# Patient Record
Sex: MALE | Race: WHITE | NOT HISPANIC OR LATINO
[De-identification: names, ages, dates, MRNs, and addresses within clinical notes are randomized per-mention and may not be internally consistent; named-entity substitution may affect disease eponyms.]

---

## 2019-01-25 RX ADMIN — Medication 1 PATCH: at 19:00

## 2019-05-10 ENCOUNTER — TRANSCRIPTION ENCOUNTER (OUTPATIENT)
Age: 49
End: 2019-05-10

## 2019-05-10 ENCOUNTER — INPATIENT (INPATIENT)
Facility: HOSPITAL | Age: 49
LOS: 25 days | Discharge: HOME | DRG: 853 | End: 2019-06-05
Attending: INTERNAL MEDICINE | Admitting: SPECIALIST
Payer: COMMERCIAL

## 2019-05-10 VITALS
OXYGEN SATURATION: 99 % | TEMPERATURE: 99 F | WEIGHT: 179.9 LBS | HEIGHT: 74 IN | SYSTOLIC BLOOD PRESSURE: 129 MMHG | DIASTOLIC BLOOD PRESSURE: 92 MMHG | RESPIRATION RATE: 17 BRPM | HEART RATE: 95 BPM

## 2019-05-10 LAB
ALBUMIN SERPL ELPH-MCNC: 3.6 G/DL — SIGNIFICANT CHANGE UP (ref 3.3–5)
ALP SERPL-CCNC: 144 U/L — HIGH (ref 40–120)
ALT FLD-CCNC: 14 U/L — SIGNIFICANT CHANGE UP (ref 12–78)
ANION GAP SERPL CALC-SCNC: 8 MMOL/L — SIGNIFICANT CHANGE UP (ref 5–17)
APTT BLD: 31.4 SEC — SIGNIFICANT CHANGE UP (ref 28.5–37)
AST SERPL-CCNC: 14 U/L — LOW (ref 15–37)
BASOPHILS # BLD AUTO: 0.04 K/UL — SIGNIFICANT CHANGE UP (ref 0–0.2)
BASOPHILS NFR BLD AUTO: 0.3 % — SIGNIFICANT CHANGE UP (ref 0–2)
BILIRUB SERPL-MCNC: 0.5 MG/DL — SIGNIFICANT CHANGE UP (ref 0.2–1.2)
BUN SERPL-MCNC: 22 MG/DL — SIGNIFICANT CHANGE UP (ref 7–23)
CALCIUM SERPL-MCNC: 8.9 MG/DL — SIGNIFICANT CHANGE UP (ref 8.5–10.1)
CHLORIDE SERPL-SCNC: 109 MMOL/L — HIGH (ref 96–108)
CO2 SERPL-SCNC: 22 MMOL/L — SIGNIFICANT CHANGE UP (ref 22–31)
CREAT SERPL-MCNC: 1.7 MG/DL — HIGH (ref 0.5–1.3)
EOSINOPHIL # BLD AUTO: 0.38 K/UL — SIGNIFICANT CHANGE UP (ref 0–0.5)
EOSINOPHIL NFR BLD AUTO: 2.4 % — SIGNIFICANT CHANGE UP (ref 0–6)
FLU A RESULT: SIGNIFICANT CHANGE UP
FLU A RESULT: SIGNIFICANT CHANGE UP
FLUAV AG NPH QL: SIGNIFICANT CHANGE UP
FLUBV AG NPH QL: SIGNIFICANT CHANGE UP
GLUCOSE SERPL-MCNC: 202 MG/DL — HIGH (ref 70–99)
HCT VFR BLD CALC: 37.1 % — LOW (ref 39–50)
HGB BLD-MCNC: 12.4 G/DL — LOW (ref 13–17)
IMM GRANULOCYTES NFR BLD AUTO: 0.4 % — SIGNIFICANT CHANGE UP (ref 0–1.5)
INR BLD: 0.94 RATIO — SIGNIFICANT CHANGE UP (ref 0.88–1.16)
LACTATE SERPL-SCNC: 0.8 MMOL/L — SIGNIFICANT CHANGE UP (ref 0.7–2)
LYMPHOCYTES # BLD AUTO: 0.89 K/UL — LOW (ref 1–3.3)
LYMPHOCYTES # BLD AUTO: 5.6 % — LOW (ref 13–44)
MCHC RBC-ENTMCNC: 31.2 PG — SIGNIFICANT CHANGE UP (ref 27–34)
MCHC RBC-ENTMCNC: 33.4 GM/DL — SIGNIFICANT CHANGE UP (ref 32–36)
MCV RBC AUTO: 93.5 FL — SIGNIFICANT CHANGE UP (ref 80–100)
MONOCYTES # BLD AUTO: 0.94 K/UL — HIGH (ref 0–0.9)
MONOCYTES NFR BLD AUTO: 5.9 % — SIGNIFICANT CHANGE UP (ref 2–14)
NEUTROPHILS # BLD AUTO: 13.49 K/UL — HIGH (ref 1.8–7.4)
NEUTROPHILS NFR BLD AUTO: 85.4 % — HIGH (ref 43–77)
NRBC # BLD: 0 /100 WBCS — SIGNIFICANT CHANGE UP (ref 0–0)
NT-PROBNP SERPL-SCNC: 832 PG/ML — HIGH (ref 0–125)
PLATELET # BLD AUTO: 301 K/UL — SIGNIFICANT CHANGE UP (ref 150–400)
POTASSIUM SERPL-MCNC: 4.4 MMOL/L — SIGNIFICANT CHANGE UP (ref 3.5–5.3)
POTASSIUM SERPL-SCNC: 4.4 MMOL/L — SIGNIFICANT CHANGE UP (ref 3.5–5.3)
PROT SERPL-MCNC: 7.4 G/DL — SIGNIFICANT CHANGE UP (ref 6–8.3)
PROTHROM AB SERPL-ACNC: 10.7 SEC — SIGNIFICANT CHANGE UP (ref 10–12.9)
RBC # BLD: 3.97 M/UL — LOW (ref 4.2–5.8)
RBC # FLD: 12.1 % — SIGNIFICANT CHANGE UP (ref 10.3–14.5)
RSV RESULT: SIGNIFICANT CHANGE UP
RSV RNA RESP QL NAA+PROBE: SIGNIFICANT CHANGE UP
SODIUM SERPL-SCNC: 139 MMOL/L — SIGNIFICANT CHANGE UP (ref 135–145)
WBC # BLD: 15.81 K/UL — HIGH (ref 3.8–10.5)
WBC # FLD AUTO: 15.81 K/UL — HIGH (ref 3.8–10.5)

## 2019-05-10 PROCEDURE — 99285 EMERGENCY DEPT VISIT HI MDM: CPT

## 2019-05-10 PROCEDURE — 74176 CT ABD & PELVIS W/O CONTRAST: CPT | Mod: 26

## 2019-05-10 PROCEDURE — 76705 ECHO EXAM OF ABDOMEN: CPT | Mod: 26

## 2019-05-10 PROCEDURE — 93010 ELECTROCARDIOGRAM REPORT: CPT

## 2019-05-10 PROCEDURE — 71045 X-RAY EXAM CHEST 1 VIEW: CPT | Mod: 26

## 2019-05-10 RX ORDER — SODIUM CHLORIDE 9 MG/ML
1000 INJECTION INTRAMUSCULAR; INTRAVENOUS; SUBCUTANEOUS ONCE
Refills: 0 | Status: COMPLETED | OUTPATIENT
Start: 2019-05-10 | End: 2019-05-10

## 2019-05-10 RX ORDER — PIPERACILLIN AND TAZOBACTAM 4; .5 G/20ML; G/20ML
3.38 INJECTION, POWDER, LYOPHILIZED, FOR SOLUTION INTRAVENOUS ONCE
Refills: 0 | Status: COMPLETED | OUTPATIENT
Start: 2019-05-10 | End: 2019-05-10

## 2019-05-10 RX ORDER — PANTOPRAZOLE SODIUM 20 MG/1
40 TABLET, DELAYED RELEASE ORAL ONCE
Refills: 0 | Status: COMPLETED | OUTPATIENT
Start: 2019-05-10 | End: 2019-05-10

## 2019-05-10 RX ORDER — ONDANSETRON 8 MG/1
4 TABLET, FILM COATED ORAL ONCE
Refills: 0 | Status: COMPLETED | OUTPATIENT
Start: 2019-05-10 | End: 2019-05-10

## 2019-05-10 RX ORDER — MORPHINE SULFATE 50 MG/1
4 CAPSULE, EXTENDED RELEASE ORAL ONCE
Refills: 0 | Status: DISCONTINUED | OUTPATIENT
Start: 2019-05-10 | End: 2019-05-10

## 2019-05-10 RX ADMIN — PIPERACILLIN AND TAZOBACTAM 200 GRAM(S): 4; .5 INJECTION, POWDER, LYOPHILIZED, FOR SOLUTION INTRAVENOUS at 23:45

## 2019-05-10 RX ADMIN — ONDANSETRON 4 MILLIGRAM(S): 8 TABLET, FILM COATED ORAL at 22:36

## 2019-05-10 RX ADMIN — MORPHINE SULFATE 4 MILLIGRAM(S): 50 CAPSULE, EXTENDED RELEASE ORAL at 22:36

## 2019-05-10 RX ADMIN — SODIUM CHLORIDE 1000 MILLILITER(S): 9 INJECTION INTRAMUSCULAR; INTRAVENOUS; SUBCUTANEOUS at 22:36

## 2019-05-10 RX ADMIN — PANTOPRAZOLE SODIUM 40 MILLIGRAM(S): 20 TABLET, DELAYED RELEASE ORAL at 23:47

## 2019-05-10 RX ADMIN — MORPHINE SULFATE 4 MILLIGRAM(S): 50 CAPSULE, EXTENDED RELEASE ORAL at 23:44

## 2019-05-10 RX ADMIN — SODIUM CHLORIDE 1000 MILLILITER(S): 9 INJECTION INTRAMUSCULAR; INTRAVENOUS; SUBCUTANEOUS at 23:40

## 2019-05-10 RX ADMIN — MORPHINE SULFATE 4 MILLIGRAM(S): 50 CAPSULE, EXTENDED RELEASE ORAL at 22:51

## 2019-05-10 NOTE — ED PROVIDER NOTE - GASTROINTESTINAL, MLM
pt has distended and multilobular type umbilical hernias the inferior hernia is reducible but the superior one is not and is painful in addition the abd is tender across the epigastrum

## 2019-05-10 NOTE — ED PROVIDER NOTE - CONSTITUTIONAL, MLM
normal... Well appearing, unkempt, well nourished, awake, alert, oriented to person, place, time/situation and in no apparent distress.

## 2019-05-10 NOTE — ED PROVIDER NOTE - CLINICAL SUMMARY MEDICAL DECISION MAKING FREE TEXT BOX
ro obstn ro pancreatitis ro gb ro pulmonary cardiac inpt with multiple sx  labss ekg xray ct pain mgt

## 2019-05-10 NOTE — ED ADULT NURSE NOTE - CHIEF COMPLAINT QUOTE
patient arrived to triage, states "I feel short of breath for the past 2 hours and my stomach hurts" patient points to umbilical area, denies radiating pain. patient denies chest pain, dizziness, headache, blurred vision, palpitations.  patient is a current smoker. patient denies medical and surgical history and illicit drug/medication use.

## 2019-05-10 NOTE — ED PROVIDER NOTE - OBJECTIVE STATEMENT
pt is a 47 yo m who has hx of chronic umbilical hernia (found on exam) denies any sig pmhx pshx smokes daily and pmd is dr Linette Orellana in [place]. presents today with sig other with pain in epigastrum on deep insp. he cant take a deep breath due to the pain.  the pain started today and is very severe he is unable to lay flat as well  no trauma no fever no chills no nv  no diarrhea

## 2019-05-10 NOTE — ED PROVIDER NOTE - PROGRESS NOTE DETAILS
radiologist called pt has perforated gastric antral ulcer and has diffuse peritoneal free air on call surgery dr corine calderón kelsi pool will admit pt to or

## 2019-05-10 NOTE — ED ADULT TRIAGE NOTE - CHIEF COMPLAINT QUOTE
patient arrived to triage, states "I feel short of breath for the past 2 hours and my stomach hurts" patient points to umbilical area, denies radiating pain. patient denies chest pain, dizziness, headache, blurred vision, palpitations.  patient is a current smoker. patient arrived to triage, states "I feel short of breath for the past 2 hours and my stomach hurts" patient points to umbilical area, denies radiating pain. patient denies chest pain, dizziness, headache, blurred vision, palpitations.  patient is a current smoker. patient denies medical and surgical history and illicit drug/medication use.

## 2019-05-10 NOTE — ED ADULT TRIAGE NOTE - SPO2 (%)
INTERVAL HPI/OVERNIGHT EVENTS:  no complaints currently  (+) heartburn overnight    HPI:  Pt is a 57 y.o. male ; pt states 9/17 ; noted back pain ; pt to pcp ; a sonogram and a c/t scam were done ; pt and partner spouse ; jaundice noted 3 weeks ago ; " there is an elevation of liver enzymes " Pt admitted to [facility] ; a w/u was done " there was a blockage in the pancreas; an ERCP was done ; a biopsy was done ; pt to surgeon ; pt now presents for [place]     Pt reports initial decrease in appetite ; pt reports 25 lb weight loss (15 Nov 2017 14:39)    MEDICATIONS  (STANDING):  aspirin  chewable 81 milliGRAM(s) Oral daily  buDESOnide  80 MICROgram(s)/formoterol 4.5 MICROgram(s) Inhaler 2 Puff(s) Inhalation two times a day  dextrose 5%. 1000 milliLiter(s) (50 mL/Hr) IV Continuous <Continuous>  dextrose 50% Injectable 12.5 Gram(s) IV Push once  dextrose 50% Injectable 25 Gram(s) IV Push once  dextrose 50% Injectable 25 Gram(s) IV Push once  docusate sodium 100 milliGRAM(s) Oral three times a day  enoxaparin Injectable 40 milliGRAM(s) SubCutaneous daily  gabapentin 100 milliGRAM(s) Oral every 8 hours  insulin glargine Injectable (LANTUS) 7 Unit(s) SubCutaneous at bedtime  insulin lispro (HumaLOG) corrective regimen sliding scale   SubCutaneous three times a day before meals  insulin lispro (HumaLOG) corrective regimen sliding scale   SubCutaneous at bedtime  insulin lispro Injectable (HumaLOG) 5 Unit(s) SubCutaneous three times a day before meals  metoprolol succinate ER 12.5 milliGRAM(s) Oral daily  pantoprazole    Tablet 40 milliGRAM(s) Oral before breakfast  polyethylene glycol 3350 17 Gram(s) Oral two times a day  senna 2 Tablet(s) Oral at bedtime  sucralfate 1 Gram(s) Oral every 12 hours  tamsulosin 0.4 milliGRAM(s) Oral at bedtime  valACYclovir 2000 milliGRAM(s) Oral two times a day    MEDICATIONS  (PRN):  acetaminophen   Tablet. 650 milliGRAM(s) Oral every 4 hours PRN Mild Pain (1 - 3)  aluminum hydroxide/magnesium hydroxide/simethicone Suspension 30 milliLiter(s) Oral every 4 hours PRN Dyspepsia  dextrose Gel 1 Dose(s) Oral once PRN Blood Glucose LESS THAN 70 milliGRAM(s)/deciliter  glucagon  Injectable 1 milliGRAM(s) IntraMuscular once PRN Glucose LESS THAN 70 milligrams/deciliter  ondansetron Injectable 4 milliGRAM(s) IV Push once PRN Nausea and/or Vomiting  oxyCODONE    IR 5 milliGRAM(s) Oral every 4 hours PRN Moderate Pain (4 - 6)  oxyCODONE    IR 10 milliGRAM(s) Oral every 4 hours PRN Severe Pain (7 - 10)  simethicone 80 milliGRAM(s) Chew two times a day PRN Gas      Allergies    No Known Allergies    Intolerances          General:  No wt loss, fevers, chills, night sweats, fatigue,   Eyes:  Good vision, no reported pain  ENT:  No sore throat, pain, runny nose, dysphagia  CV:  No pain, palpitations, hypo/hypertension  Resp:  No dyspnea, cough, tachypnea, wheezing  GI:  No pain, No nausea, No vomiting, No diarrhea, No constipation, No weight loss, No fever, No pruritis, No rectal bleeding, No tarry stools, No dysphagia,  :  No pain, bleeding, incontinence, nocturia  Muscle:  No pain, weakness  Neuro:  No weakness, tingling, memory problems  Psych:  No fatigue, insomnia, mood problems, depression  Endocrine:  No polyuria, polydipsia, cold/heat intolerance  Heme:  No petechiae, ecchymosis, easy bruisability  Skin:  No rash, tattoos, scars, edema      PHYSICAL EXAM:   Vital Signs:  Vital Signs Last 24 Hrs  T(C): 36.6 (15 Dec 2017 12:14), Max: 37.2 (14 Dec 2017 20:01)  T(F): 97.8 (15 Dec 2017 12:14), Max: 98.9 (14 Dec 2017 20:01)  HR: 98 (15 Dec 2017 12:14) (94 - 105)  BP: 116/72 (15 Dec 2017 12:14) (112/70 - 127/74)  BP(mean): --  RR: 16 (15 Dec 2017 12:14) (16 - 18)  SpO2: 97% (15 Dec 2017 12:14) (95% - 99%)  Daily     Daily I&O's Summary    14 Dec 2017 07:01  -  15 Dec 2017 07:00  --------------------------------------------------------  IN: 440 mL / OUT: 2595 mL / NET: -2155 mL        GENERAL:  Appears stated age, well-groomed, well-nourished, no distress  HEENT:  NC/AT,  conjunctivae clear and pink, no thyromegaly, nodules, adenopathy, no JVD, sclera -anicteric  CHEST:  Full & symmetric excursion, no increased effort, breath sounds clear  HEART:  Regular rhythm, S1, S2, no murmur/rub/S3/S4, no abdominal bruit, no edema  ABDOMEN:  Soft, non-tender, non-distended, normoactive bowel sounds,  no masses ,no hepato-splenomegaly, no signs of chronic liver disease,  (+) isidoro, (+) j tube  EXTEREMITIES:  no cyanosis,clubbing or edema  SKIN:  No rash/erythema/ecchymoses/petechiae/wounds/abscess/warm/dry  NEURO:  Alert, oriented, no asterixis, no tremor, no encephalopathy      LABS:                        12.9   7.95  )-----------( 725      ( 15 Dec 2017 05:52 )             39.9     12-15    137  |  96<L>  |  8   ----------------------------<  195<H>  4.1   |  26  |  0.86    Ca    8.9      15 Dec 2017 05:52  Phos  3.2     12-15  Mg     2.3     12-15    TPro  6.7  /  Alb  3.1<L>  /  TBili  0.8  /  DBili  x   /  AST  15  /  ALT  20  /  AlkPhos  135<H>  12-15        amylase   lipase  RADIOLOGY & ADDITIONAL TESTS: 99

## 2019-05-10 NOTE — ED ADULT NURSE NOTE - OBJECTIVE STATEMENT
Received patient awake and alert C/O SOB for the past 2 hours and abdominal pain. Patient has a hx of chronic Umbilical hernia. Verbalized pain upon inspiration, states he can't take a deep breath. No fever/chills, no N/V/D, no other complaints at this time, family member at bedside, safety and comfort maintained, will continue to monitor.

## 2019-05-11 ENCOUNTER — RESULT REVIEW (OUTPATIENT)
Age: 49
End: 2019-05-11

## 2019-05-11 DIAGNOSIS — K25.1 ACUTE GASTRIC ULCER WITH PERFORATION: ICD-10-CM

## 2019-05-11 LAB
ALBUMIN SERPL ELPH-MCNC: 2.9 G/DL — LOW (ref 3.3–5)
ALP SERPL-CCNC: 107 U/L — SIGNIFICANT CHANGE UP (ref 40–120)
ALT FLD-CCNC: 31 U/L — SIGNIFICANT CHANGE UP (ref 12–78)
ANION GAP SERPL CALC-SCNC: 7 MMOL/L — SIGNIFICANT CHANGE UP (ref 5–17)
AST SERPL-CCNC: 33 U/L — SIGNIFICANT CHANGE UP (ref 15–37)
BASE EXCESS BLDA CALC-SCNC: -5.8 MMOL/L — LOW (ref -2–2)
BASOPHILS # BLD AUTO: 0 K/UL — SIGNIFICANT CHANGE UP (ref 0–0.2)
BASOPHILS NFR BLD AUTO: 0 % — SIGNIFICANT CHANGE UP (ref 0–2)
BILIRUB SERPL-MCNC: 0.5 MG/DL — SIGNIFICANT CHANGE UP (ref 0.2–1.2)
BLD GP AB SCN SERPL QL: SIGNIFICANT CHANGE UP
BLOOD GAS COMMENTS ARTERIAL: SIGNIFICANT CHANGE UP
BLOOD GAS COMMENTS ARTERIAL: SIGNIFICANT CHANGE UP
BUN SERPL-MCNC: 24 MG/DL — HIGH (ref 7–23)
CALCIUM SERPL-MCNC: 8 MG/DL — LOW (ref 8.5–10.1)
CHLORIDE SERPL-SCNC: 113 MMOL/L — HIGH (ref 96–108)
CO2 SERPL-SCNC: 22 MMOL/L — SIGNIFICANT CHANGE UP (ref 22–31)
CREAT SERPL-MCNC: 1.9 MG/DL — HIGH (ref 0.5–1.3)
EOSINOPHIL # BLD AUTO: 0 K/UL — SIGNIFICANT CHANGE UP (ref 0–0.5)
EOSINOPHIL NFR BLD AUTO: 0 % — SIGNIFICANT CHANGE UP (ref 0–6)
GLUCOSE SERPL-MCNC: 196 MG/DL — HIGH (ref 70–99)
HBA1C BLD-MCNC: 8 % — HIGH (ref 4–5.6)
HCO3 BLDA-SCNC: 20 MMOL/L — LOW (ref 23–27)
HCT VFR BLD CALC: 33.2 % — LOW (ref 39–50)
HGB BLD-MCNC: 11.1 G/DL — LOW (ref 13–17)
HOROWITZ INDEX BLDA+IHG-RTO: 40 — SIGNIFICANT CHANGE UP
INR BLD: 1.05 RATIO — SIGNIFICANT CHANGE UP (ref 0.88–1.16)
LACTATE SERPL-SCNC: 1.6 MMOL/L — SIGNIFICANT CHANGE UP (ref 0.7–2)
LYMPHOCYTES # BLD AUTO: 0.77 K/UL — LOW (ref 1–3.3)
LYMPHOCYTES # BLD AUTO: 5 % — LOW (ref 13–44)
MAGNESIUM SERPL-MCNC: 2.1 MG/DL — SIGNIFICANT CHANGE UP (ref 1.6–2.6)
MCHC RBC-ENTMCNC: 32.1 PG — SIGNIFICANT CHANGE UP (ref 27–34)
MCHC RBC-ENTMCNC: 33.4 GM/DL — SIGNIFICANT CHANGE UP (ref 32–36)
MCV RBC AUTO: 96 FL — SIGNIFICANT CHANGE UP (ref 80–100)
MONOCYTES # BLD AUTO: 0 K/UL — SIGNIFICANT CHANGE UP (ref 0–0.9)
MONOCYTES NFR BLD AUTO: 0 % — LOW (ref 2–14)
NEUTROPHILS # BLD AUTO: 14.58 K/UL — HIGH (ref 1.8–7.4)
NEUTROPHILS NFR BLD AUTO: 91 % — HIGH (ref 43–77)
NRBC # BLD: SIGNIFICANT CHANGE UP /100 WBCS (ref 0–0)
PCO2 BLDA: 41 MMHG — SIGNIFICANT CHANGE UP (ref 32–46)
PH BLDA: 7.3 — LOW (ref 7.35–7.45)
PHOSPHATE SERPL-MCNC: 3.5 MG/DL — SIGNIFICANT CHANGE UP (ref 2.5–4.5)
PLATELET # BLD AUTO: 246 K/UL — SIGNIFICANT CHANGE UP (ref 150–400)
PO2 BLDA: 111 MMHG — HIGH (ref 74–108)
POTASSIUM SERPL-MCNC: 4.7 MMOL/L — SIGNIFICANT CHANGE UP (ref 3.5–5.3)
POTASSIUM SERPL-SCNC: 4.7 MMOL/L — SIGNIFICANT CHANGE UP (ref 3.5–5.3)
PROT SERPL-MCNC: 6.1 G/DL — SIGNIFICANT CHANGE UP (ref 6–8.3)
PROTHROM AB SERPL-ACNC: 11.9 SEC — SIGNIFICANT CHANGE UP (ref 10–12.9)
RBC # BLD: 3.46 M/UL — LOW (ref 4.2–5.8)
RBC # FLD: 12.3 % — SIGNIFICANT CHANGE UP (ref 10.3–14.5)
SAO2 % BLDA: 98 % — HIGH (ref 92–96)
SODIUM SERPL-SCNC: 142 MMOL/L — SIGNIFICANT CHANGE UP (ref 135–145)
WBC # BLD: 15.35 K/UL — HIGH (ref 3.8–10.5)
WBC # FLD AUTO: 15.35 K/UL — HIGH (ref 3.8–10.5)

## 2019-05-11 PROCEDURE — 99233 SBSQ HOSP IP/OBS HIGH 50: CPT

## 2019-05-11 PROCEDURE — 43840 GSTRRPHY SUTR DUOL/GSTR ULCR: CPT | Mod: AS

## 2019-05-11 PROCEDURE — 88304 TISSUE EXAM BY PATHOLOGIST: CPT | Mod: 26

## 2019-05-11 RX ORDER — ACETAMINOPHEN 500 MG
1000 TABLET ORAL ONCE
Refills: 0 | Status: DISCONTINUED | OUTPATIENT
Start: 2019-05-11 | End: 2019-05-12

## 2019-05-11 RX ORDER — METOCLOPRAMIDE HCL 10 MG
5 TABLET ORAL ONCE
Refills: 0 | Status: DISCONTINUED | OUTPATIENT
Start: 2019-05-11 | End: 2019-05-11

## 2019-05-11 RX ORDER — INSULIN LISPRO 100/ML
VIAL (ML) SUBCUTANEOUS EVERY 6 HOURS
Refills: 0 | Status: DISCONTINUED | OUTPATIENT
Start: 2019-05-11 | End: 2019-05-18

## 2019-05-11 RX ORDER — SODIUM CHLORIDE 9 MG/ML
1000 INJECTION, SOLUTION INTRAVENOUS
Refills: 0 | Status: DISCONTINUED | OUTPATIENT
Start: 2019-05-11 | End: 2019-05-11

## 2019-05-11 RX ORDER — DEXTROSE 50 % IN WATER 50 %
15 SYRINGE (ML) INTRAVENOUS ONCE
Refills: 0 | Status: DISCONTINUED | OUTPATIENT
Start: 2019-05-11 | End: 2019-05-28

## 2019-05-11 RX ORDER — CEFEPIME 1 G/1
1000 INJECTION, POWDER, FOR SOLUTION INTRAMUSCULAR; INTRAVENOUS EVERY 12 HOURS
Refills: 0 | Status: DISCONTINUED | OUTPATIENT
Start: 2019-05-11 | End: 2019-05-11

## 2019-05-11 RX ORDER — METRONIDAZOLE 500 MG
500 TABLET ORAL EVERY 8 HOURS
Refills: 0 | Status: DISCONTINUED | OUTPATIENT
Start: 2019-05-11 | End: 2019-05-11

## 2019-05-11 RX ORDER — HYDROMORPHONE HYDROCHLORIDE 2 MG/ML
0.5 INJECTION INTRAMUSCULAR; INTRAVENOUS; SUBCUTANEOUS
Refills: 0 | Status: DISCONTINUED | OUTPATIENT
Start: 2019-05-11 | End: 2019-05-11

## 2019-05-11 RX ORDER — DEXTROSE 50 % IN WATER 50 %
12.5 SYRINGE (ML) INTRAVENOUS ONCE
Refills: 0 | Status: DISCONTINUED | OUTPATIENT
Start: 2019-05-11 | End: 2019-05-28

## 2019-05-11 RX ORDER — PANTOPRAZOLE SODIUM 20 MG/1
40 TABLET, DELAYED RELEASE ORAL DAILY
Refills: 0 | Status: DISCONTINUED | OUTPATIENT
Start: 2019-05-11 | End: 2019-05-11

## 2019-05-11 RX ORDER — GLUCAGON INJECTION, SOLUTION 0.5 MG/.1ML
1 INJECTION, SOLUTION SUBCUTANEOUS ONCE
Refills: 0 | Status: DISCONTINUED | OUTPATIENT
Start: 2019-05-11 | End: 2019-05-28

## 2019-05-11 RX ORDER — CHLORHEXIDINE GLUCONATE 213 G/1000ML
1 SOLUTION TOPICAL DAILY
Refills: 0 | Status: DISCONTINUED | OUTPATIENT
Start: 2019-05-11 | End: 2019-05-28

## 2019-05-11 RX ORDER — ENOXAPARIN SODIUM 100 MG/ML
40 INJECTION SUBCUTANEOUS EVERY 24 HOURS
Refills: 0 | Status: DISCONTINUED | OUTPATIENT
Start: 2019-05-11 | End: 2019-05-15

## 2019-05-11 RX ORDER — METRONIDAZOLE 500 MG
500 TABLET ORAL ONCE
Refills: 0 | Status: COMPLETED | OUTPATIENT
Start: 2019-05-11 | End: 2019-05-11

## 2019-05-11 RX ORDER — PIPERACILLIN AND TAZOBACTAM 4; .5 G/20ML; G/20ML
3.38 INJECTION, POWDER, LYOPHILIZED, FOR SOLUTION INTRAVENOUS EVERY 8 HOURS
Refills: 0 | Status: COMPLETED | OUTPATIENT
Start: 2019-05-11 | End: 2019-05-15

## 2019-05-11 RX ORDER — HYDROMORPHONE HYDROCHLORIDE 2 MG/ML
0.5 INJECTION INTRAMUSCULAR; INTRAVENOUS; SUBCUTANEOUS EVERY 4 HOURS
Refills: 0 | Status: DISCONTINUED | OUTPATIENT
Start: 2019-05-11 | End: 2019-05-16

## 2019-05-11 RX ORDER — PANTOPRAZOLE SODIUM 20 MG/1
40 TABLET, DELAYED RELEASE ORAL EVERY 12 HOURS
Refills: 0 | Status: DISCONTINUED | OUTPATIENT
Start: 2019-05-15 | End: 2019-05-15

## 2019-05-11 RX ORDER — CHLORHEXIDINE GLUCONATE 213 G/1000ML
1 SOLUTION TOPICAL
Refills: 0 | Status: DISCONTINUED | OUTPATIENT
Start: 2019-05-11 | End: 2019-05-12

## 2019-05-11 RX ORDER — MORPHINE SULFATE 50 MG/1
8 CAPSULE, EXTENDED RELEASE ORAL EVERY 4 HOURS
Refills: 0 | Status: DISCONTINUED | OUTPATIENT
Start: 2019-05-15 | End: 2019-05-15

## 2019-05-11 RX ORDER — DEXTROSE 50 % IN WATER 50 %
25 SYRINGE (ML) INTRAVENOUS ONCE
Refills: 0 | Status: DISCONTINUED | OUTPATIENT
Start: 2019-05-11 | End: 2019-05-28

## 2019-05-11 RX ORDER — ACETAMINOPHEN 500 MG
650 TABLET ORAL EVERY 6 HOURS
Refills: 0 | Status: DISCONTINUED | OUTPATIENT
Start: 2019-05-11 | End: 2019-05-16

## 2019-05-11 RX ORDER — ENOXAPARIN SODIUM 100 MG/ML
40 INJECTION SUBCUTANEOUS EVERY 24 HOURS
Refills: 0 | Status: DISCONTINUED | OUTPATIENT
Start: 2019-05-11 | End: 2019-05-11

## 2019-05-11 RX ORDER — PANTOPRAZOLE SODIUM 20 MG/1
40 TABLET, DELAYED RELEASE ORAL
Refills: 0 | Status: DISCONTINUED | OUTPATIENT
Start: 2019-05-11 | End: 2019-05-16

## 2019-05-11 RX ORDER — HYDROMORPHONE HYDROCHLORIDE 2 MG/ML
1 INJECTION INTRAMUSCULAR; INTRAVENOUS; SUBCUTANEOUS EVERY 4 HOURS
Refills: 0 | Status: DISCONTINUED | OUTPATIENT
Start: 2019-05-15 | End: 2019-05-15

## 2019-05-11 RX ORDER — HYDROMORPHONE HYDROCHLORIDE 2 MG/ML
1 INJECTION INTRAMUSCULAR; INTRAVENOUS; SUBCUTANEOUS EVERY 4 HOURS
Refills: 0 | Status: DISCONTINUED | OUTPATIENT
Start: 2019-05-11 | End: 2019-05-16

## 2019-05-11 RX ORDER — SODIUM CHLORIDE 9 MG/ML
1000 INJECTION, SOLUTION INTRAVENOUS
Refills: 0 | Status: DISCONTINUED | OUTPATIENT
Start: 2019-05-11 | End: 2019-05-28

## 2019-05-11 RX ORDER — NICOTINE POLACRILEX 2 MG
1 GUM BUCCAL DAILY
Refills: 0 | Status: DISCONTINUED | OUTPATIENT
Start: 2019-05-11 | End: 2019-05-28

## 2019-05-11 RX ORDER — CEFAZOLIN SODIUM 1 G
2000 VIAL (EA) INJECTION ONCE
Refills: 0 | Status: COMPLETED | OUTPATIENT
Start: 2019-05-11 | End: 2019-05-11

## 2019-05-11 RX ORDER — SODIUM CHLORIDE 9 MG/ML
1000 INJECTION, SOLUTION INTRAVENOUS
Refills: 0 | Status: DISCONTINUED | OUTPATIENT
Start: 2019-05-11 | End: 2019-05-12

## 2019-05-11 RX ORDER — OXYCODONE HYDROCHLORIDE 5 MG/1
5 TABLET ORAL ONCE
Refills: 0 | Status: DISCONTINUED | OUTPATIENT
Start: 2019-05-11 | End: 2019-05-11

## 2019-05-11 RX ADMIN — SODIUM CHLORIDE 150 MILLILITER(S): 9 INJECTION, SOLUTION INTRAVENOUS at 23:08

## 2019-05-11 RX ADMIN — CHLORHEXIDINE GLUCONATE 1 APPLICATION(S): 213 SOLUTION TOPICAL at 05:14

## 2019-05-11 RX ADMIN — Medication 2: at 17:56

## 2019-05-11 RX ADMIN — Medication 2: at 12:24

## 2019-05-11 RX ADMIN — Medication 2: at 06:38

## 2019-05-11 RX ADMIN — CHLORHEXIDINE GLUCONATE 1 APPLICATION(S): 213 SOLUTION TOPICAL at 12:24

## 2019-05-11 RX ADMIN — PIPERACILLIN AND TAZOBACTAM 25 GRAM(S): 4; .5 INJECTION, POWDER, LYOPHILIZED, FOR SOLUTION INTRAVENOUS at 22:00

## 2019-05-11 RX ADMIN — PIPERACILLIN AND TAZOBACTAM 25 GRAM(S): 4; .5 INJECTION, POWDER, LYOPHILIZED, FOR SOLUTION INTRAVENOUS at 06:19

## 2019-05-11 RX ADMIN — MORPHINE SULFATE 4 MILLIGRAM(S): 50 CAPSULE, EXTENDED RELEASE ORAL at 00:14

## 2019-05-11 RX ADMIN — PANTOPRAZOLE SODIUM 40 MILLIGRAM(S): 20 TABLET, DELAYED RELEASE ORAL at 11:03

## 2019-05-11 RX ADMIN — Medication 1 PATCH: at 12:23

## 2019-05-11 RX ADMIN — HYDROMORPHONE HYDROCHLORIDE 1 MILLIGRAM(S): 2 INJECTION INTRAMUSCULAR; INTRAVENOUS; SUBCUTANEOUS at 19:54

## 2019-05-11 RX ADMIN — HYDROMORPHONE HYDROCHLORIDE 0.5 MILLIGRAM(S): 2 INJECTION INTRAMUSCULAR; INTRAVENOUS; SUBCUTANEOUS at 14:00

## 2019-05-11 RX ADMIN — SODIUM CHLORIDE 150 MILLILITER(S): 9 INJECTION, SOLUTION INTRAVENOUS at 11:12

## 2019-05-11 RX ADMIN — PIPERACILLIN AND TAZOBACTAM 25 GRAM(S): 4; .5 INJECTION, POWDER, LYOPHILIZED, FOR SOLUTION INTRAVENOUS at 14:49

## 2019-05-11 RX ADMIN — HYDROMORPHONE HYDROCHLORIDE 0.5 MILLIGRAM(S): 2 INJECTION INTRAMUSCULAR; INTRAVENOUS; SUBCUTANEOUS at 12:56

## 2019-05-11 RX ADMIN — Medication 1 PATCH: at 19:17

## 2019-05-11 RX ADMIN — SODIUM CHLORIDE 125 MILLILITER(S): 9 INJECTION, SOLUTION INTRAVENOUS at 06:14

## 2019-05-11 RX ADMIN — HYDROMORPHONE HYDROCHLORIDE 1 MILLIGRAM(S): 2 INJECTION INTRAMUSCULAR; INTRAVENOUS; SUBCUTANEOUS at 04:25

## 2019-05-11 RX ADMIN — PANTOPRAZOLE SODIUM 40 MILLIGRAM(S): 20 TABLET, DELAYED RELEASE ORAL at 17:57

## 2019-05-11 RX ADMIN — HYDROMORPHONE HYDROCHLORIDE 1 MILLIGRAM(S): 2 INJECTION INTRAMUSCULAR; INTRAVENOUS; SUBCUTANEOUS at 20:15

## 2019-05-11 RX ADMIN — HYDROMORPHONE HYDROCHLORIDE 1 MILLIGRAM(S): 2 INJECTION INTRAMUSCULAR; INTRAVENOUS; SUBCUTANEOUS at 04:10

## 2019-05-11 RX ADMIN — ENOXAPARIN SODIUM 40 MILLIGRAM(S): 100 INJECTION SUBCUTANEOUS at 17:57

## 2019-05-11 RX ADMIN — SODIUM CHLORIDE 100 MILLILITER(S): 9 INJECTION, SOLUTION INTRAVENOUS at 03:24

## 2019-05-11 NOTE — BRIEF OPERATIVE NOTE - NSICDXBRIEFPROCEDURE_GEN_ALL_CORE_FT
PROCEDURES:  Omentopexy 11-May-2019 02:36:00  Ruperto Mejia  Plication of gastric ulcer 11-May-2019 02:35:39  Ruperto Mejia

## 2019-05-11 NOTE — PROGRESS NOTE ADULT - SUBJECTIVE AND OBJECTIVE BOX
Afeb VSS  Pain controlled. Alert  WBC 15  creat 1.9  BUN 24  Abd expected tenderness  P: increase fluids as per Dr Jacobs

## 2019-05-11 NOTE — PROGRESS NOTE ADULT - ATTENDING COMMENTS
48M PMH HTN, DM2 (not on meds), now admitted with perforated gastric antral ulcer with chemical peritonitis, underwent emergent omentopexy and plication for perforated antral ulcer, POD 0. He also has mild OBI.     Plan:   --Neuro: pain mx with prn Tylenol and Dilaudid   --Cardiac: stable  --Pulm: stable, continue IS  --GI: NGT suction, NPO, diet per sx, monitor JO output and surgical site. Start PPI for perforated ulcer   --Renal: increase LR to 150 ml/hr, monitor renal fn, avoid nephrotoxic meds, d/c Aguilera   --ID: on Zosyn, f/u BCx  --MSK: OOB, ambulate   --PPx: Lovenox    Stable for surgical floor 48M PMH HTN, DM2 (not on meds), now admitted with perforated gastric antral ulcer with chemical peritonitis, underwent emergent omentopexy and plication for perforated antral ulcer, POD 0. He also has mild OBI.     Plan:   --Neuro: pain mx with prn Tylenol and Dilaudid   --Cardiac: stable  --Pulm: stable, continue IS  --GI: NGT suction, NPO, diet per sx, monitor JO output and surgical site. Start PPI for perforated ulcer   --Renal: increase LR to 150 ml/hr, monitor renal fn, avoid nephrotoxic meds, d/c Aguilera   --ID: on Zosyn, f/u BCx  --Endo: Humalog sliding scale, f/u HbA1C  --MSK: OOB, ambulate   --PPx: Lovenox    Stable for surgical floor

## 2019-05-11 NOTE — DIETITIAN INITIAL EVALUATION ADULT. - ENERGY NEEDS
Wt: , Ht: , BMI: , IBW:  (+/-10%), %IBW:  No edema or pressure injuries noted at this time Wt: 179.14lbs (admission), Ht: 74in, BMI: 23, IBW: 190lbs (+/-10%), %IBW: 94  No edema or pressure injuries noted at this time

## 2019-05-11 NOTE — DIETITIAN INITIAL EVALUATION ADULT. - NS AS NUTRI INTERV MEALS SNACK
When medically feasible recommend to advance diet to clear liquids then to Consistent CHO, DASH/TLC diet. RD to remain available./General/healthful diet

## 2019-05-11 NOTE — CONSULT NOTE ADULT - ASSESSMENT
ASSESSMENT   47 yo M with PMH DM (uncontrolled) and HTN, heavy smoker, h/o chronic umbilical hernia, (PMD Dr. Linette Orellana in [place]) p/w pain in epigastric pain on inspiration. Pt was found to have an acute gastric ulcer perforation, now POD#0, s/p omentopexy and plication of the gastric ulcer for 1 cm diameter perforated pyloric ulcer, extubated in recovery and  transferred to the ICU postoperatively severe sepsis in the setting of perforated gastric ulcer, peritonitis, pre-renal vs. ischemic atn, metabolic acidosis, and close monitoring ON    1. Severe sepsis     2. Peritonitis   3. acute gastric ulcer perforation  4. OBI prerenal vs ATN   5. metabolic acidosis   6. Aspiration PNA vs. Viral PNA vs. CAP    PLAN     NEURO:   -Pain control with dilaudid and Ofirmev  PRN   -Tylenol PRN for fevers  -Nicotine patch ordered     PULM:    -Pt was extubated in the recovery room  -On exam noted to haev a productive cough, will send for RVP/Flu and sputum   -Pt oxygenating well on Venti mask   -Post ABG with mild metabolic acidosis     CV:   -Pt s/p 1L bolus post op-  -LA pending post op labs  -Pt is awake alert and oriented, extremities warm to touch with good capillary refill, UOP adequate at this time  -Will start IVF hydration with LR @125    GI:    -NPO, advance diet as tolerated and as per surgery rec   -reglan for dyspepsia   -NGT to sxn   -Monitor output in JO drain     :   -Check repeat labs and monitor renal function trend  -IVF hydration   -Avoid hypotension and optimize BP with IVF and pressor support if needed.   -Avoid nephrotoxic meds as possible. Avoid ACEI, ARB and NSAIDS  -Monitor input and output    ENDO:   SSI    ID:   -Zosyn for peritonitis and to cover any aspiration PNA vs. viral PNA      HEME/DVT PPX:   SCD  Restart Lovenox as per surgery recs     ETHICS       CODE STATUS: Full Code  GOC discussion: Y    Critical Care time: 40 mins assessing presenting problems of acute illness that poses high probability of life threatening deterioration or end organ damage/dysfunction.  Medical decision making including Initiating plan of care, reviewing data, reviewing radiology, direct patient bedside evaluation and interpretation of vital signs, any necessary ventilator management , discussion with multidisciplinary team, discussing goals of care with patient/family, all non inclusive of procedures     Care discussed with bedside provider and eICU attending. If any additional assistance in care/management of patient required by bedside team, provider/RN/family member advised to push "e-alert" button in patient's room, and details will be discussed at that time

## 2019-05-11 NOTE — H&P ADULT - PROBLEM SELECTOR PLAN 1
repair or plicate. The indication for, alternatives to and possible complications of the procedure were discussed with the patient, and all questions were answered

## 2019-05-11 NOTE — PROGRESS NOTE ADULT - SUBJECTIVE AND OBJECTIVE BOX
The patient was interviewed and evaluated  48y Male    Vital Signs Last 24 Hrs  T(C): 36.6 (11 May 2019 08:30), Max: 37.2 (10 May 2019 21:47)  T(F): 97.8 (11 May 2019 08:30), Max: 99 (10 May 2019 21:47)  HR: 78 (11 May 2019 09:00) (66 - 95)  BP: 107/79 (11 May 2019 09:00) (97/59 - 142/75)  BP(mean): 89 (11 May 2019 09:00) (72 - 102)  RR: 25 (11 May 2019 09:00) (12 - 27)  SpO2: 97% (11 May 2019 09:00) (91% - 99%)    Pt seen, doing well, no anesthesia complications or complaints noted or reported.   No Nausea    No additional recommendations.     Pain well controlled

## 2019-05-11 NOTE — DIETITIAN INITIAL EVALUATION ADULT. - OTHER INFO
Pt seen in ICU. As per chart pt is a 48 year old male with a PMH of DM (uncontrolled) and HTN, heavy smoker, h/o chronic umbilical hernia, p/w pain in epigastric pain on inspiration. Pt was found to have an acute gastric ulcer perforation, now POD#0, s/p omentopexy and plication of the gastric ulcer for 1 cm diameter perforated pyloric ulcer, extubated in recovery and  transferred to the ICU postoperatively severe sepsis in the setting of perforated gastric ulcer, peritonitis, pre-renal vs. ischemic atn, metabolic acidosis, and close monitoring ON. Pt remains NPO. Pt's admission weight per chart 179.14lbs. Pt seen in ICU. As per chart pt is a 48 year old male with a PMH of DM (uncontrolled) and HTN, heavy smoker, h/o chronic umbilical hernia, p/w pain in epigastric pain on inspiration. Pt was found to have an acute gastric ulcer perforation, now POD#0, s/p omentopexy and plication of the gastric ulcer for 1 cm diameter perforated pyloric ulcer, extubated in recovery and  transferred to the ICU postoperatively severe sepsis in the setting of perforated gastric ulcer, peritonitis, pre-renal vs. ischemic atn, metabolic acidosis, and close monitoring ON. Pt remains NPO. Pt's admission weight per chart 179.14lbs. Pt reports current and UBW of 180lbs, stable denies any significant recent weight changes. Denies any chewing/ swallowing difficulties, denies any GI distress at this time, reports last BM 5/10.

## 2019-05-11 NOTE — PROGRESS NOTE ADULT - SUBJECTIVE AND OBJECTIVE BOX
Patient is a 48y old  Male who presents with a chief complaint of perforated stomach ulcer (11 May 2019 05:05)    24 hour events: s/p omentopexy and gastric ulcer plication for perforated antral ulcer   patient feels better today     REVIEW OF SYSTEMS  Constitutional: No fever, chills, fatigue  Neuro: No headache, numbness, weakness  Resp: No cough, wheezing, shortness of breath  CVS: No chest pain, palpitations, leg swelling  GI: +abdominal pain, No nausea, vomiting, diarrhea   : No dysuria, frequency, incontinence  Skin: No itching, burning, rashes, or lesions   Msk: No joint pain or swelling  Psych: No depression, anxiety, mood swings  Heme: No bleeding    T(F): 97.8 (05-11-19 @ 08:30), Max: 99 (05-10-19 @ 21:47)  HR: 74 (05-11-19 @ 08:30) (66 - 95)  BP: 103/55 (05-11-19 @ 08:00) (97/59 - 142/75)  RR: 19 (05-11-19 @ 08:30) (12 - 27)  SpO2: 97% (05-11-19 @ 08:30) (91% - 99%)    CAPILLARY BLOOD GLUCOSE  POCT Blood Glucose.: 187 mg/dL (11 May 2019 06:25)  POCT Blood Glucose.: 239 mg/dL (11 May 2019 02:57)    I&O's Summary    05-10 @ 07:01  -  05-11 @ 07:00  --------------------------------------------------------  IN: 450 mL / OUT: 255 mL / NET: 195 mL    05-11 @ 07:01  -  05-11 @ 09:25  --------------------------------------------------------  IN: 275 mL / OUT: 65 mL / NET: 210 mL    PHYSICAL EXAM  General: NAD  CNS: AAOx3, no focal deficits  HEENT: PERRL, dry mucosa  Resp: clear b/l  CVS: S1S2, regular  Abd: mildly distended, dressing intact w/o discharge/bleeding, JO w/ serosanguinous drainage, NGT, no BS, mild tenderness  Ext: no edema   Skin: warm     MEDICATIONS  piperacillin/tazobactam IVPB. IV Intermittent  insulin lispro (HumaLOG) corrective regimen sliding scale SubCutaneous  HYDROmorphone  Injectable IV Push PRN  HYDROmorphone  Injectable IV Push PRN  enoxaparin Injectable SubCutaneous  pantoprazole  Injectable IV Push  lactated ringers. IV Continuous  chlorhexidine 2% Cloths Topical  chlorhexidine 4% Liquid Topical  nicotine -  14 mG/24Hr(s) Patch Transdermal                        11.1   15.35 )-----------( 246      ( 11 May 2019 05:31 )             33.2     Bands 4.0    05-11    142  |  113<H>  |  24<H>  ----------------------------<  196<H>  4.7   |  22  |  1.90<H>    Ca    8.0<L>      11 May 2019 05:31  Phos  3.5     05-11  Mg     2.1     05-11    TPro  6.1  /  Alb  2.9<L>  /  TBili  0.5  /  DBili  x   /  AST  33  /  ALT  31  /  AlkPhos  107  05-11    Lactate 1.6           05-11 @ 05:31    Lactate 0.8           05-10 @ 22:27    PT/INR - ( 11 May 2019 05:31 )   PT: 11.9 sec;   INR: 1.05 ratio      PTT - ( 10 May 2019 22:27 )  PTT:31.4 sec    Bedside lung ultrasound: a-line predominant, left base atelecatsis  Bedside ECHO: normal systolic and diastolic fn, MR    CENTRAL LINE: N      RODRIGUEZ: Y                            REMOVE: Y  A-LINE: N                         GLOBAL ISSUE/BEST PRACTICE  Analgesia: Y  Sedation: NA  CAM-ICU: neg  HOB elevation: yes  Stress ulcer prophylaxis: N  VTE prophylaxis: Y  Glycemic control: Y  Nutrition: N    CODE STATUS: full  GOC discussion: Y

## 2019-05-11 NOTE — DIETITIAN INITIAL EVALUATION ADULT. - ADHERENCE
Pt report that he does not specifically follow any therapeutic diets PTA, however reports that he tries to watch his sugar intake. Pt reports that he checks his BG levels 3x/day, usual reading about 120. Denies any vitamin/ mineral supplementation PTA. NKFA./fair

## 2019-05-11 NOTE — DIETITIAN INITIAL EVALUATION ADULT. - NUTRITION INTERVENTION
Collaboration and Referral of Nutrition Care/Meals and Snack Nutrition Education/Collaboration and Referral of Nutrition Care/Meals and Snack

## 2019-05-12 LAB
ALBUMIN SERPL ELPH-MCNC: 2.4 G/DL — LOW (ref 3.3–5)
ALP SERPL-CCNC: 85 U/L — SIGNIFICANT CHANGE UP (ref 40–120)
ALT FLD-CCNC: 22 U/L — SIGNIFICANT CHANGE UP (ref 12–78)
ANION GAP SERPL CALC-SCNC: 9 MMOL/L — SIGNIFICANT CHANGE UP (ref 5–17)
APPEARANCE UR: CLEAR — SIGNIFICANT CHANGE UP
AST SERPL-CCNC: 19 U/L — SIGNIFICANT CHANGE UP (ref 15–37)
BASOPHILS # BLD AUTO: 0.04 K/UL — SIGNIFICANT CHANGE UP (ref 0–0.2)
BASOPHILS NFR BLD AUTO: 0.2 % — SIGNIFICANT CHANGE UP (ref 0–2)
BILIRUB SERPL-MCNC: 0.4 MG/DL — SIGNIFICANT CHANGE UP (ref 0.2–1.2)
BILIRUB UR-MCNC: NEGATIVE — SIGNIFICANT CHANGE UP
BUN SERPL-MCNC: 29 MG/DL — HIGH (ref 7–23)
CALCIUM SERPL-MCNC: 8.1 MG/DL — LOW (ref 8.5–10.1)
CHLORIDE SERPL-SCNC: 111 MMOL/L — HIGH (ref 96–108)
CO2 SERPL-SCNC: 22 MMOL/L — SIGNIFICANT CHANGE UP (ref 22–31)
COLOR SPEC: YELLOW — SIGNIFICANT CHANGE UP
CREAT SERPL-MCNC: 1.7 MG/DL — HIGH (ref 0.5–1.3)
DIFF PNL FLD: ABNORMAL
EOSINOPHIL # BLD AUTO: 0.12 K/UL — SIGNIFICANT CHANGE UP (ref 0–0.5)
EOSINOPHIL NFR BLD AUTO: 0.7 % — SIGNIFICANT CHANGE UP (ref 0–6)
GLUCOSE SERPL-MCNC: 157 MG/DL — HIGH (ref 70–99)
GLUCOSE UR QL: NEGATIVE — SIGNIFICANT CHANGE UP
HBA1C BLD-MCNC: 8 % — HIGH (ref 4–5.6)
HCT VFR BLD CALC: 30.8 % — LOW (ref 39–50)
HGB BLD-MCNC: 10 G/DL — LOW (ref 13–17)
IMM GRANULOCYTES NFR BLD AUTO: 0.7 % — SIGNIFICANT CHANGE UP (ref 0–1.5)
KETONES UR-MCNC: ABNORMAL
LEUKOCYTE ESTERASE UR-ACNC: ABNORMAL
LYMPHOCYTES # BLD AUTO: 0.76 K/UL — LOW (ref 1–3.3)
LYMPHOCYTES # BLD AUTO: 4.6 % — LOW (ref 13–44)
MAGNESIUM SERPL-MCNC: 2.1 MG/DL — SIGNIFICANT CHANGE UP (ref 1.6–2.6)
MCHC RBC-ENTMCNC: 31.6 PG — SIGNIFICANT CHANGE UP (ref 27–34)
MCHC RBC-ENTMCNC: 32.5 GM/DL — SIGNIFICANT CHANGE UP (ref 32–36)
MCV RBC AUTO: 97.5 FL — SIGNIFICANT CHANGE UP (ref 80–100)
MONOCYTES # BLD AUTO: 1.24 K/UL — HIGH (ref 0–0.9)
MONOCYTES NFR BLD AUTO: 7.4 % — SIGNIFICANT CHANGE UP (ref 2–14)
NEUTROPHILS # BLD AUTO: 14.4 K/UL — HIGH (ref 1.8–7.4)
NEUTROPHILS NFR BLD AUTO: 86.4 % — HIGH (ref 43–77)
NITRITE UR-MCNC: NEGATIVE — SIGNIFICANT CHANGE UP
NRBC # BLD: 0 /100 WBCS — SIGNIFICANT CHANGE UP (ref 0–0)
PH UR: 5 — SIGNIFICANT CHANGE UP (ref 5–8)
PHOSPHATE SERPL-MCNC: 3.1 MG/DL — SIGNIFICANT CHANGE UP (ref 2.5–4.5)
PLATELET # BLD AUTO: 223 K/UL — SIGNIFICANT CHANGE UP (ref 150–400)
POTASSIUM SERPL-MCNC: 4.1 MMOL/L — SIGNIFICANT CHANGE UP (ref 3.5–5.3)
POTASSIUM SERPL-SCNC: 4.1 MMOL/L — SIGNIFICANT CHANGE UP (ref 3.5–5.3)
PROT SERPL-MCNC: 5.9 G/DL — LOW (ref 6–8.3)
PROT UR-MCNC: 25 MG/DL
RBC # BLD: 3.16 M/UL — LOW (ref 4.2–5.8)
RBC # FLD: 12.7 % — SIGNIFICANT CHANGE UP (ref 10.3–14.5)
SODIUM SERPL-SCNC: 142 MMOL/L — SIGNIFICANT CHANGE UP (ref 135–145)
SP GR SPEC: 1.01 — SIGNIFICANT CHANGE UP (ref 1.01–1.02)
UROBILINOGEN FLD QL: NEGATIVE — SIGNIFICANT CHANGE UP
WBC # BLD: 16.67 K/UL — HIGH (ref 3.8–10.5)
WBC # FLD AUTO: 16.67 K/UL — HIGH (ref 3.8–10.5)

## 2019-05-12 PROCEDURE — 71045 X-RAY EXAM CHEST 1 VIEW: CPT | Mod: 26

## 2019-05-12 PROCEDURE — 99233 SBSQ HOSP IP/OBS HIGH 50: CPT

## 2019-05-12 RX ORDER — SODIUM CHLORIDE 9 MG/ML
1000 INJECTION, SOLUTION INTRAVENOUS
Refills: 0 | Status: DISCONTINUED | OUTPATIENT
Start: 2019-05-12 | End: 2019-05-17

## 2019-05-12 RX ORDER — ALBUTEROL 90 UG/1
2.5 AEROSOL, METERED ORAL ONCE
Refills: 0 | Status: COMPLETED | OUTPATIENT
Start: 2019-05-12 | End: 2019-05-12

## 2019-05-12 RX ADMIN — ALBUTEROL 2.5 MILLIGRAM(S): 90 AEROSOL, METERED ORAL at 15:37

## 2019-05-12 RX ADMIN — PANTOPRAZOLE SODIUM 40 MILLIGRAM(S): 20 TABLET, DELAYED RELEASE ORAL at 17:45

## 2019-05-12 RX ADMIN — SODIUM CHLORIDE 150 MILLILITER(S): 9 INJECTION, SOLUTION INTRAVENOUS at 05:20

## 2019-05-12 RX ADMIN — HYDROMORPHONE HYDROCHLORIDE 1 MILLIGRAM(S): 2 INJECTION INTRAMUSCULAR; INTRAVENOUS; SUBCUTANEOUS at 19:56

## 2019-05-12 RX ADMIN — HYDROMORPHONE HYDROCHLORIDE 1 MILLIGRAM(S): 2 INJECTION INTRAMUSCULAR; INTRAVENOUS; SUBCUTANEOUS at 14:30

## 2019-05-12 RX ADMIN — HYDROMORPHONE HYDROCHLORIDE 1 MILLIGRAM(S): 2 INJECTION INTRAMUSCULAR; INTRAVENOUS; SUBCUTANEOUS at 13:32

## 2019-05-12 RX ADMIN — CHLORHEXIDINE GLUCONATE 1 APPLICATION(S): 213 SOLUTION TOPICAL at 12:24

## 2019-05-12 RX ADMIN — Medication 1 PATCH: at 12:24

## 2019-05-12 RX ADMIN — HYDROMORPHONE HYDROCHLORIDE 1 MILLIGRAM(S): 2 INJECTION INTRAMUSCULAR; INTRAVENOUS; SUBCUTANEOUS at 09:54

## 2019-05-12 RX ADMIN — HYDROMORPHONE HYDROCHLORIDE 1 MILLIGRAM(S): 2 INJECTION INTRAMUSCULAR; INTRAVENOUS; SUBCUTANEOUS at 03:23

## 2019-05-12 RX ADMIN — PIPERACILLIN AND TAZOBACTAM 25 GRAM(S): 4; .5 INJECTION, POWDER, LYOPHILIZED, FOR SOLUTION INTRAVENOUS at 05:11

## 2019-05-12 RX ADMIN — Medication 1 PATCH: at 07:43

## 2019-05-12 RX ADMIN — HYDROMORPHONE HYDROCHLORIDE 1 MILLIGRAM(S): 2 INJECTION INTRAMUSCULAR; INTRAVENOUS; SUBCUTANEOUS at 03:25

## 2019-05-12 RX ADMIN — HYDROMORPHONE HYDROCHLORIDE 1 MILLIGRAM(S): 2 INJECTION INTRAMUSCULAR; INTRAVENOUS; SUBCUTANEOUS at 11:21

## 2019-05-12 RX ADMIN — Medication 1 PATCH: at 12:23

## 2019-05-12 RX ADMIN — PIPERACILLIN AND TAZOBACTAM 25 GRAM(S): 4; .5 INJECTION, POWDER, LYOPHILIZED, FOR SOLUTION INTRAVENOUS at 22:34

## 2019-05-12 RX ADMIN — PANTOPRAZOLE SODIUM 40 MILLIGRAM(S): 20 TABLET, DELAYED RELEASE ORAL at 05:11

## 2019-05-12 RX ADMIN — ENOXAPARIN SODIUM 40 MILLIGRAM(S): 100 INJECTION SUBCUTANEOUS at 17:44

## 2019-05-12 RX ADMIN — HYDROMORPHONE HYDROCHLORIDE 1 MILLIGRAM(S): 2 INJECTION INTRAMUSCULAR; INTRAVENOUS; SUBCUTANEOUS at 20:10

## 2019-05-12 RX ADMIN — PIPERACILLIN AND TAZOBACTAM 25 GRAM(S): 4; .5 INJECTION, POWDER, LYOPHILIZED, FOR SOLUTION INTRAVENOUS at 13:32

## 2019-05-12 RX ADMIN — Medication 1 PATCH: at 20:03

## 2019-05-12 RX ADMIN — Medication 2: at 05:42

## 2019-05-12 NOTE — PROGRESS NOTE ADULT - SUBJECTIVE AND OBJECTIVE BOX
Patient is a 48y old  Male who presents with a chief complaint of perforated stomach ulcer (11 May 2019 10:40)    24 hour events: unable to void after d/cing Rodriguez - had to be straight cath'd   c/o intermittent SOB and unable to take deep breaths     REVIEW OF SYSTEMS  Constitutional: No fever, chills, fatigue  Neuro: No headache, numbness, weakness  Resp: +shortness of breath, No cough, wheezing  CVS: No chest pain, palpitations, leg swelling  GI: No abdominal pain, nausea, vomiting, diarrhea   : +urinary retention, No dysuria, frequency, incontinence  Skin: No itching, burning, rashes, or lesions   Msk: No joint pain or swelling  Psych: No depression, anxiety, mood swings  Heme: No bleeding    T(F): 99.6 (05-12-19 @ 09:00), Max: 99.6 (05-12-19 @ 09:00)  HR: 78 (05-12-19 @ 10:00) (69 - 84)  BP: 146/73 (05-12-19 @ 10:00) (102/59 - 146/73)  RR: 17 (05-12-19 @ 10:00) (15 - 29)  SpO2: 94% (05-12-19 @ 10:00) (93% - 100%)    CAPILLARY BLOOD GLUCOSE  POCT Blood Glucose.: 166 mg/dL (12 May 2019 05:29)  POCT Blood Glucose.: 123 mg/dL (11 May 2019 23:10)  POCT Blood Glucose.: 154 mg/dL (11 May 2019 17:55)  POCT Blood Glucose.: 156 mg/dL (11 May 2019 12:22)    I&O's Summary    05-11 @ 07:01  -  05-12 @ 07:00  --------------------------------------------------------  IN: 3875 mL / OUT: 1305 mL / NET: 2570 mL    05-12 @ 07:01  -  05-12 @ 10:30  --------------------------------------------------------  IN: 450 mL / OUT: 0 mL / NET: 450 mL    PHYSICAL EXAM  General: NAD  CNS: AAOx3, no focal deficits   HEENT: PERRL  Resp: clear mostly, diminished bibasilar   CVS: S1S2, regular   Abd: soft, mild tenderness, JO & NGT with minimal drainage, no BS  Ext: no edema, left foot wound  Skin: warm    MEDICATIONS  piperacillin/tazobactam IVPB. IV Intermittent  insulin lispro (HumaLOG) corrective regimen sliding scale SubCutaneous  acetaminophen  Suppository .. Rectal PRN  HYDROmorphone  Injectable IV Push PRN  HYDROmorphone  Injectable IV Push PRN  enoxaparin Injectable SubCutaneous  pantoprazole  Injectable IV Push  dextrose 5%. IV Continuous  lactated ringers. IV Continuous  chlorhexidine 2% Cloths Topical  chlorhexidine 4% Liquid Topical  nicotine -  14 mG/24Hr(s) Patch Transdermal                        10.0   16.67 )-----------( 223      ( 12 May 2019 05:18 )             30.8     05-12    142  |  111<H>  |  29<H>  ----------------------------<  157<H>  4.1   |  22  |  1.70<H>    Ca    8.1<L>      12 May 2019 05:18  Phos  3.1     05-12  Mg     2.1     05-12    TPro  5.9<L>  /  Alb  2.4<L>  /  TBili  0.4  /  DBili  x   /  AST  19  /  ALT  22  /  AlkPhos  85  05-12    PT/INR - ( 11 May 2019 05:31 )   PT: 11.9 sec;   INR: 1.05 ratio       PTT - ( 10 May 2019 22:27 )  PTT:31.4 sec    CENTRAL LINE: N            RODRIGUEZ: N                          A-LINE: N                       GLOBAL ISSUE/BEST PRACTICE  Analgesia: Y  Sedation: NA  CAM-ICU: neg  HOB elevation: yes  Stress ulcer prophylaxis: NA  VTE prophylaxis: Y  Glycemic control: Y  Nutrition: N    CODE STATUS: full

## 2019-05-12 NOTE — PROGRESS NOTE ADULT - ATTENDING COMMENTS
48M PMH HTN, DM2 (not on meds), now admitted with perforated gastric antral ulcer with chemical peritonitis, underwent emergent omentopexy and plication for perforated antral ulcer, POD 0. He also has mild OBI.     Plan:   --Neuro: pain mx with prn Tylenol and Dilaudid   --Cardiac: stable  --Pulm: SOB likely due to atelectasis/vascular congestion, will decrease IVF to 100ml/hr, encourage IS, ambulate  --GI: NGT suction, NPO, diet per sx, monitor JO output and surgical site. Continue PPI for perforated ulcer   --Renal: renal fn stable/improving, decrease LR to 100 ml/hr. Urinary retention - monitor for now, may need repeat straight cath vs Aguilera, says he has not had any problems urinating at home and does not have BPH   --ID: on Zosyn, f/u BCx  --Endo: glucose controlled with Humalog sliding scale, will need meds on discharge (not on anti-diabetics at home and HbA1C 8)  --Skin: consult Podiatry for left foot wound   --MSK: OOB, ambulate   --PPx: Lovenox    Remains stable for surgical floor 48M PMH HTN, DM2 (not on meds), now admitted with perforated gastric antral ulcer with chemical peritonitis, underwent emergent omentopexy and plication for perforated antral ulcer, POD 0. He also has mild OBI.     Plan:   --Neuro: pain mx with prn Tylenol and Dilaudid   --Cardiac: stable  --Pulm: SOB likely due to atelectasis/vascular congestion, will decrease IVF to 100ml/hr, encourage IS, ambulate  --GI: NGT suction, NPO, diet per sx, monitor JO output and surgical site. Continue PPI for perforated ulcer   --Renal: renal fn stable/improving, decrease LR to 100 ml/hr. Urinary retention - monitor for now, may need repeat straight cath vs Aguilera, says he has not had any problems urinating at home and does not have BPH   --ID: on Zosyn, f/u BCx  --Endo: glucose controlled with Humalog sliding scale, will need meds on discharge (not on anti-diabetics at home and HbA1C 8)  --Skin: consult Podiatry for left foot wound   --MSK: OOB, ambulate   --PPx: Lovenox    Remains stable for surgical floor    Addendum (15:30): patient has not urinated since am, bladder scan with 700ml of urine - will place Agiulera for urinary retention. Also, he has been desaturating (low 80s), lung US with a-line predominance, elevated diaphragms, left base atelectasis, bedside ECHO with hyperdynamic LV fn, MR, normal RV size and fn and small IVC. Hypoxia likely due to atelectasis - will continue O2, encourage IS and deep breathing and use bipap at night. Check official ECHO to quantify MRYakov Cancel transfer to floor 48M PMH HTN, DM2 (not on meds), now admitted with perforated gastric antral ulcer with chemical peritonitis, underwent emergent omentopexy and plication for perforated antral ulcer, POD 1. He also has mild OIB.     Plan:   --Neuro: pain mx with prn Tylenol and Dilaudid   --Cardiac: stable  --Pulm: SOB likely due to atelectasis/vascular congestion, will decrease IVF to 100ml/hr, encourage IS, ambulate  --GI: NGT suction, NPO, diet per sx, monitor JO output and surgical site. Continue PPI for perforated ulcer   --Renal: renal fn stable/improving, decrease LR to 100 ml/hr. Urinary retention - monitor for now, may need repeat straight cath vs Aguilera, says he has not had any problems urinating at home and does not have BPH   --ID: on Zosyn, f/u BCx  --Endo: glucose controlled with Humalog sliding scale, will need meds on discharge (not on anti-diabetics at home and HbA1C 8)  --Skin: consult Podiatry for left foot wound   --MSK: OOB, ambulate   --PPx: Lovenox    Remains stable for surgical floor    Addendum (15:30): patient has not urinated since am, bladder scan with 700ml of urine - will place Aguilera for urinary retention. Also, he has been desaturating (low 80s), lung US with a-line predominance, elevated diaphragms, left base atelectasis, bedside ECHO with hyperdynamic LV fn, MR, normal RV size and fn and small IVC. Hypoxia likely due to atelectasis - will continue O2, encourage IS and deep breathing and use bipap at night. Check official ECHO to quantify MRYakov Cancel transfer to floor

## 2019-05-12 NOTE — PROGRESS NOTE ADULT - SUBJECTIVE AND OBJECTIVE BOX
48y year old Male admitted for Patient is a 48y old  Male who presents with a chief complaint of perforated stomach ulcer (12 May 2019 13:29)          49 yo M wit history of DM and HTN with perforated gastric ulcer POD 1 from omentoplexy and found to have mild peritonitis. Today failed TOV and had jacques replaced, also had episode of oxygen desaturation with atelectasis on CXR, this evening with fever 101.4. He feels fatigued and has a dull nonn radiating pain near lower chest/epigastrum.     ROS:  General: +fatigued, no fever, chills  Pulm: no SBO  Cardio: no  palpitations  GI: +post op abdominal pain, no N/V      PMH:  Acute gastric ulcer with perforation  Handoff  MEWS Score  Diabetes mellitus with no complication  No pertinent past medical history  Perforated gastric ulcer  Perforated gastric ulcer  Acute gastric ulcer with perforation  Acute gastric ulcer with perforation  Omentopexy  Plication of gastric ulcer  No significant past surgical history  DIFFICULTY BREATHING/ABD PAIN        MEDICATIONS  (STANDING):  chlorhexidine 2% Cloths 1 Application(s) Topical daily  dextrose 5%. 1000 milliLiter(s) (50 mL/Hr) IV Continuous <Continuous>  dextrose 50% Injectable 12.5 Gram(s) IV Push once  dextrose 50% Injectable 25 Gram(s) IV Push once  dextrose 50% Injectable 25 Gram(s) IV Push once  enoxaparin Injectable 40 milliGRAM(s) SubCutaneous every 24 hours  insulin lispro (HumaLOG) corrective regimen sliding scale   SubCutaneous every 6 hours  lactated ringers. 1000 milliLiter(s) (100 mL/Hr) IV Continuous <Continuous>  nicotine -  14 mG/24Hr(s) Patch 1 patch Transdermal daily  pantoprazole  Injectable 40 milliGRAM(s) IV Push two times a day  piperacillin/tazobactam IVPB. 3.375 Gram(s) IV Intermittent every 8 hours    MEDICATIONS  (PRN):  acetaminophen  Suppository .. 650 milliGRAM(s) Rectal every 6 hours PRN Temp greater or equal to 38C (100.4F)  dextrose 40% Gel 15 Gram(s) Oral once PRN Blood Glucose LESS THAN 70 milliGRAM(s)/deciliter  glucagon  Injectable 1 milliGRAM(s) IntraMuscular once PRN Glucose LESS THAN 70 milligrams/deciliter  HYDROmorphone  Injectable 0.5 milliGRAM(s) IV Push every 4 hours PRN Moderate Pain (4 - 6)  HYDROmorphone  Injectable 1 milliGRAM(s) IV Push every 4 hours PRN Severe Pain (7 - 10)      I&O's Summary    11 May 2019 07:01  -  12 May 2019 07:00  --------------------------------------------------------  IN: 3875 mL / OUT: 1305 mL / NET: 2570 mL    12 May 2019 07:01  -  12 May 2019 22:08  --------------------------------------------------------  IN: 1850 mL / OUT: 1430 mL / NET: 420 mL      ICU Vital Signs Last 24 Hrs  T(C): 38.6 (12 May 2019 21:00), Max: 38.6 (12 May 2019 21:00)  T(F): 101.4 (12 May 2019 21:00), Max: 101.4 (12 May 2019 21:00)  HR: 89 (12 May 2019 21:00) (73 - 92)  BP: 139/65 (12 May 2019 21:00) (116/64 - 158/70)  BP(mean): 93 (12 May 2019 21:00) (84 - 108)  ABP: --  ABP(mean): --  RR: 17 (12 May 2019 21:00) (14 - 27)  SpO2: 95% (12 May 2019 21:00) (93% - 97%)      PHYSICAL EXAM:  General: NAD conversant  CV: s1s2 RRR no murmurs  pulm: Clear bilaterally   GI: soft, surgical dressing C/D/I, JO drain with small amount of blood tinged serious fluid  Extremities: no periferal edema  Neuro: AAoX3          142  |  111<H>  |  29<H>  ----------------------------<  157<H>  4.1   |  22  |  1.70<H>    Ca    8.1<L>      12 May 2019 05:18  Phos  3.1     05-12  Mg     2.1     05-12    TPro  5.9<L>  /  Alb  2.4<L>  /  TBili  0.4  /  DBili  x   /  AST  19  /  ALT  22  /  AlkPhos  85  05-12                          10.0   16.67 )-----------( 223      ( 12 May 2019 05:18 )             30.8     ABG - ( 11 May 2019 04:41 )  pH, Arterial: 7.30  pH, Blood: x     /  pCO2: 41    /  pO2: 111   / HCO3: 20    / Base Excess: -5.8  /  SaO2: 98                    Urinalysis Basic - ( 12 May 2019 21:26 )    Color: Yellow / Appearance: Clear / S.015 / pH: x  Gluc: x / Ketone: Small  / Bili: Negative / Urobili: Negative   Blood: x / Protein: 25 mg/dL / Nitrite: Negative   Leuk Esterase: Trace / RBC: 0-2 /HPF / WBC 6-10   Sq Epi: x / Non Sq Epi: Occasional / Bacteria: Few           Culture - Blood (collected 11 May 2019 12:13)  Source: .Blood Blood-Peripheral  Preliminary Report (12 May 2019 13:00):    No growth to date.    Culture - Blood (collected 11 May 2019 12:13)  Source: .Blood Blood-Peripheral  Preliminary Report (12 May 2019 13:00):    No growth to date.        Assessment:  1. perforated gastric ulcer s/p omentoplexy  2. peritonitis   2. urinary retention  3. fever  4. atelectasis  5. HTN  6. DM II    49 yo M with DM type II, HTN, with perforated peptic ulcer POD 1 from omentoplexy, post op complicated by hypoxia from atelectasis, urinary retention and now with fever 101.4 likely from atelectasis, other vital signs stable.   - A UA was sent and is positive with 6-10 WBC, check UCX  - follow up BCX results from yesterday. If fever goes higher will get repeat BCX  - continue zosyn   - incentive preethi  - continue jacques, monitor i/Os  - Monitor FS goal 140-180, insulin sliding scale  - pain control with dilaudid   - SQH and protonix for ppx  - Continue ICU monitoring

## 2019-05-12 NOTE — PROGRESS NOTE ADULT - SUBJECTIVE AND OBJECTIVE BOX
T 99.6  VS wnl  Alert. No flatus yet  Abd: soft, mild distention  WBC 16.7  JO: 40 ml  N ml (bile)  P: continue NG

## 2019-05-13 LAB
ALBUMIN SERPL ELPH-MCNC: 2.3 G/DL — LOW (ref 3.3–5)
ALP SERPL-CCNC: 80 U/L — SIGNIFICANT CHANGE UP (ref 40–120)
ALT FLD-CCNC: 18 U/L — SIGNIFICANT CHANGE UP (ref 12–78)
ANION GAP SERPL CALC-SCNC: 7 MMOL/L — SIGNIFICANT CHANGE UP (ref 5–17)
AST SERPL-CCNC: 25 U/L — SIGNIFICANT CHANGE UP (ref 15–37)
BASOPHILS # BLD AUTO: 0.03 K/UL — SIGNIFICANT CHANGE UP (ref 0–0.2)
BASOPHILS NFR BLD AUTO: 0.3 % — SIGNIFICANT CHANGE UP (ref 0–2)
BILIRUB DIRECT SERPL-MCNC: 0.2 MG/DL — SIGNIFICANT CHANGE UP (ref 0.05–0.2)
BILIRUB INDIRECT FLD-MCNC: 0.1 MG/DL — LOW (ref 0.2–1)
BILIRUB SERPL-MCNC: 0.3 MG/DL — SIGNIFICANT CHANGE UP (ref 0.2–1.2)
BUN SERPL-MCNC: 26 MG/DL — HIGH (ref 7–23)
CALCIUM SERPL-MCNC: 8.1 MG/DL — LOW (ref 8.5–10.1)
CHLORIDE SERPL-SCNC: 111 MMOL/L — HIGH (ref 96–108)
CK SERPL-CCNC: 307 U/L — SIGNIFICANT CHANGE UP (ref 26–308)
CO2 SERPL-SCNC: 26 MMOL/L — SIGNIFICANT CHANGE UP (ref 22–31)
CREAT SERPL-MCNC: 1.6 MG/DL — HIGH (ref 0.5–1.3)
EOSINOPHIL # BLD AUTO: 0.11 K/UL — SIGNIFICANT CHANGE UP (ref 0–0.5)
EOSINOPHIL NFR BLD AUTO: 0.9 % — SIGNIFICANT CHANGE UP (ref 0–6)
GLUCOSE SERPL-MCNC: 141 MG/DL — HIGH (ref 70–99)
GRAM STN FLD: SIGNIFICANT CHANGE UP
HCT VFR BLD CALC: 30 % — LOW (ref 39–50)
HGB BLD-MCNC: 9.5 G/DL — LOW (ref 13–17)
IMM GRANULOCYTES NFR BLD AUTO: 0.8 % — SIGNIFICANT CHANGE UP (ref 0–1.5)
LYMPHOCYTES # BLD AUTO: 0.82 K/UL — LOW (ref 1–3.3)
LYMPHOCYTES # BLD AUTO: 6.9 % — LOW (ref 13–44)
MAGNESIUM SERPL-MCNC: 2.3 MG/DL — SIGNIFICANT CHANGE UP (ref 1.6–2.6)
MCHC RBC-ENTMCNC: 31.1 PG — SIGNIFICANT CHANGE UP (ref 27–34)
MCHC RBC-ENTMCNC: 31.7 GM/DL — LOW (ref 32–36)
MCV RBC AUTO: 98.4 FL — SIGNIFICANT CHANGE UP (ref 80–100)
MONOCYTES # BLD AUTO: 1.16 K/UL — HIGH (ref 0–0.9)
MONOCYTES NFR BLD AUTO: 9.7 % — SIGNIFICANT CHANGE UP (ref 2–14)
NEUTROPHILS # BLD AUTO: 9.69 K/UL — HIGH (ref 1.8–7.4)
NEUTROPHILS NFR BLD AUTO: 81.4 % — HIGH (ref 43–77)
NRBC # BLD: 0 /100 WBCS — SIGNIFICANT CHANGE UP (ref 0–0)
PHOSPHATE SERPL-MCNC: 3 MG/DL — SIGNIFICANT CHANGE UP (ref 2.5–4.5)
PLATELET # BLD AUTO: 233 K/UL — SIGNIFICANT CHANGE UP (ref 150–400)
POTASSIUM SERPL-MCNC: 4.2 MMOL/L — SIGNIFICANT CHANGE UP (ref 3.5–5.3)
POTASSIUM SERPL-SCNC: 4.2 MMOL/L — SIGNIFICANT CHANGE UP (ref 3.5–5.3)
PROT SERPL-MCNC: 6.1 G/DL — SIGNIFICANT CHANGE UP (ref 6–8.3)
RBC # BLD: 3.05 M/UL — LOW (ref 4.2–5.8)
RBC # FLD: 12.8 % — SIGNIFICANT CHANGE UP (ref 10.3–14.5)
SODIUM SERPL-SCNC: 144 MMOL/L — SIGNIFICANT CHANGE UP (ref 135–145)
SPECIMEN SOURCE: SIGNIFICANT CHANGE UP
WBC # BLD: 11.91 K/UL — HIGH (ref 3.8–10.5)
WBC # FLD AUTO: 11.91 K/UL — HIGH (ref 3.8–10.5)

## 2019-05-13 PROCEDURE — 73630 X-RAY EXAM OF FOOT: CPT | Mod: 26,LT

## 2019-05-13 PROCEDURE — 93306 TTE W/DOPPLER COMPLETE: CPT | Mod: 26

## 2019-05-13 PROCEDURE — 99233 SBSQ HOSP IP/OBS HIGH 50: CPT

## 2019-05-13 RX ADMIN — HYDROMORPHONE HYDROCHLORIDE 1 MILLIGRAM(S): 2 INJECTION INTRAMUSCULAR; INTRAVENOUS; SUBCUTANEOUS at 16:03

## 2019-05-13 RX ADMIN — Medication 1 PATCH: at 12:27

## 2019-05-13 RX ADMIN — HYDROMORPHONE HYDROCHLORIDE 1 MILLIGRAM(S): 2 INJECTION INTRAMUSCULAR; INTRAVENOUS; SUBCUTANEOUS at 20:45

## 2019-05-13 RX ADMIN — HYDROMORPHONE HYDROCHLORIDE 1 MILLIGRAM(S): 2 INJECTION INTRAMUSCULAR; INTRAVENOUS; SUBCUTANEOUS at 06:45

## 2019-05-13 RX ADMIN — HYDROMORPHONE HYDROCHLORIDE 1 MILLIGRAM(S): 2 INJECTION INTRAMUSCULAR; INTRAVENOUS; SUBCUTANEOUS at 01:38

## 2019-05-13 RX ADMIN — Medication 2: at 00:09

## 2019-05-13 RX ADMIN — SODIUM CHLORIDE 100 MILLILITER(S): 9 INJECTION, SOLUTION INTRAVENOUS at 01:00

## 2019-05-13 RX ADMIN — PIPERACILLIN AND TAZOBACTAM 25 GRAM(S): 4; .5 INJECTION, POWDER, LYOPHILIZED, FOR SOLUTION INTRAVENOUS at 14:01

## 2019-05-13 RX ADMIN — PANTOPRAZOLE SODIUM 40 MILLIGRAM(S): 20 TABLET, DELAYED RELEASE ORAL at 05:36

## 2019-05-13 RX ADMIN — PIPERACILLIN AND TAZOBACTAM 25 GRAM(S): 4; .5 INJECTION, POWDER, LYOPHILIZED, FOR SOLUTION INTRAVENOUS at 05:36

## 2019-05-13 RX ADMIN — HYDROMORPHONE HYDROCHLORIDE 1 MILLIGRAM(S): 2 INJECTION INTRAMUSCULAR; INTRAVENOUS; SUBCUTANEOUS at 10:17

## 2019-05-13 RX ADMIN — Medication 2: at 23:14

## 2019-05-13 RX ADMIN — HYDROMORPHONE HYDROCHLORIDE 1 MILLIGRAM(S): 2 INJECTION INTRAMUSCULAR; INTRAVENOUS; SUBCUTANEOUS at 06:30

## 2019-05-13 RX ADMIN — HYDROMORPHONE HYDROCHLORIDE 1 MILLIGRAM(S): 2 INJECTION INTRAMUSCULAR; INTRAVENOUS; SUBCUTANEOUS at 20:29

## 2019-05-13 RX ADMIN — SODIUM CHLORIDE 75 MILLILITER(S): 9 INJECTION, SOLUTION INTRAVENOUS at 22:49

## 2019-05-13 RX ADMIN — ENOXAPARIN SODIUM 40 MILLIGRAM(S): 100 INJECTION SUBCUTANEOUS at 17:05

## 2019-05-13 RX ADMIN — Medication 1 PATCH: at 19:46

## 2019-05-13 RX ADMIN — Medication 1 PATCH: at 12:30

## 2019-05-13 RX ADMIN — HYDROMORPHONE HYDROCHLORIDE 1 MILLIGRAM(S): 2 INJECTION INTRAMUSCULAR; INTRAVENOUS; SUBCUTANEOUS at 16:33

## 2019-05-13 RX ADMIN — Medication 2: at 12:31

## 2019-05-13 RX ADMIN — Medication 1 PATCH: at 07:00

## 2019-05-13 RX ADMIN — HYDROMORPHONE HYDROCHLORIDE 1 MILLIGRAM(S): 2 INJECTION INTRAMUSCULAR; INTRAVENOUS; SUBCUTANEOUS at 01:08

## 2019-05-13 RX ADMIN — HYDROMORPHONE HYDROCHLORIDE 1 MILLIGRAM(S): 2 INJECTION INTRAMUSCULAR; INTRAVENOUS; SUBCUTANEOUS at 10:47

## 2019-05-13 RX ADMIN — PANTOPRAZOLE SODIUM 40 MILLIGRAM(S): 20 TABLET, DELAYED RELEASE ORAL at 17:05

## 2019-05-13 RX ADMIN — PIPERACILLIN AND TAZOBACTAM 25 GRAM(S): 4; .5 INJECTION, POWDER, LYOPHILIZED, FOR SOLUTION INTRAVENOUS at 21:07

## 2019-05-13 RX ADMIN — SODIUM CHLORIDE 100 MILLILITER(S): 9 INJECTION, SOLUTION INTRAVENOUS at 12:31

## 2019-05-13 RX ADMIN — CHLORHEXIDINE GLUCONATE 1 APPLICATION(S): 213 SOLUTION TOPICAL at 12:30

## 2019-05-13 NOTE — PHYSICAL THERAPY INITIAL EVALUATION ADULT - GAIT TRAINING, PT EVAL
Pt will perform ambulation /c rolling walker >75' or as tolerated /c Min/CG Ax1 or better in 5 sessions

## 2019-05-13 NOTE — PROGRESS NOTE ADULT - ASSESSMENT
49 yo M with DM type II, HTN, with perforated peptic ulcer POD 2 from omentoplexy and plication of gastric ulcer complicated by hypoxia from atelectasis, urinary retention and fever of 101.4 likely from atelectasis and retention.     - Neuro: Continue pain control with dilaudid and tylenol. F/u PT recs.  - Cardio: Bedside ECHO revealed MR and hyperdynamic LV function. F/u official ECHO.  - Resp: Current saturating well on NC, no longer endorses dyspnea. Encouraged incentive spirometry and ambulation as tolerated.  - GI: NPO, continue IV protonix BID. NGT in place with dark green secretions.  - Renal: Aguilera in place with good urine output. Renew for now and re-evaluate possible removal tomorrow.  - ID: Febrile overnight possibly secondary to atelectasis vs urinary retention vs open foot wound. Dr. Hale (podiatry) and wound care consulted for foot wound. Continue zosyn. F/u blood, urine and sputum cultures  - Heme: Stable  - Endocrine: HbA1c 8 - patient was on lantus and metformin outpatient but stopped after losing health insurance. Continue moderate dose insulin sliding scale.  - Prophylaxis: Lovenox 40 daily

## 2019-05-13 NOTE — PHYSICAL THERAPY INITIAL EVALUATION ADULT - GAIT DEVIATIONS NOTED, PT EVAL
decreased sunshine/decreased stride length/decreased velocity of limb motion/decreased weight-shifting ability/decreased step length/unsteady, limps to LLE due to bilateral foot drop/weakness, right > left

## 2019-05-13 NOTE — CONSULT NOTE ADULT - ATTENDING COMMENTS
Patient seen and evaluated   X rays ordered   f/u X rays   Patient wound cleaned with NS and dressed with silver alginate DSD   Continue local wound care bedside   Podiatry will continue to follow while in house     Wound Care instructions   Clean wound with NS   Apply silver alginate to wound with Gauze and paul   Change every other day

## 2019-05-13 NOTE — PROGRESS NOTE ADULT - SUBJECTIVE AND OBJECTIVE BOX
Patient is a 48y old  Male who presents with a chief complaint of perforated stomach ulcer (13 May 2019 11:41)    Interval History: Patient failed TOV yesterday requiring replacement of jacques catheter. Developed fever 101.4 in evening, urinalysis performed and cultures sent. Patient seen and examined at bedside, endorses 8/10 lower abdominal pain, denies fever/chills, flatus, BM, nausea/vomiting.    REVIEW OF SYSTEMS  Constitutional: No fever, chills, fatigue  Neuro: No headache, numbness, weakness  Resp: No cough, wheezing, shortness of breath  CVS: No chest pain, palpitations, leg swelling  GI: + abdominal pain, no nausea, vomiting, diarrhea   : No dysuria, frequency, incontinence  Skin: No itching, burning, rashes, or lesions   Msk: No joint pain or swelling  Psych: No depression, anxiety, mood swings  Heme: No bleeding    T(F): 99.3 (19 @ 12:00), Max: 101.4 (19 @ 21:00)  HR: 71 (19 @ 12:00) (71 - 92)  BP: 146/69 (19 @ 12:00) (117/61 - 176/79)  RR: 14 (19 @ 12:00) (14 - 27)  SpO2: 97% (19 @ 12:00) (93% - 97%)  Wt(kg): --      CAPILLARY BLOOD GLUCOSE      POCT Blood Glucose.: 166 mg/dL (13 May 2019 11:42)  POCT Blood Glucose.: 145 mg/dL (13 May 2019 05:55)  POCT Blood Glucose.: 157 mg/dL (13 May 2019 00:02)  POCT Blood Glucose.: 150 mg/dL (12 May 2019 17:26)    I&O's Summary     @ 07:  -   @ 07:00  --------------------------------------------------------  IN: 2850 mL / OUT: 2350 mL / NET: 500 mL     @ 07:01  -   @ 13:26  --------------------------------------------------------  IN: 400 mL / OUT: 700 mL / NET: -300 mL    Physical Exam:  General: Sitting uncomfortably in chair  HEENT: NCAT, EOMI, +NGT in place, draining dark green secretions.  Neck: Supple, nontender, no mass  Neurology: A&Ox3, nonfocal   Respiratory: diminished breath sounds bilaterally with poor inspiratory effort, no rales or wheezing  CV: RRR, +S1/S2, no murmurs, rubs or gallops  Abdominal: Soft, diffusely tender to palpation, ND, hypoactive BS, +JO draining serosanguinous fluids, +surgical bandage along stomach, dry without leakage  Extremities: +1cm open wound on medial left big toe secreting pus.     MEDICATIONS  piperacillin/tazobactam IVPB. IV Intermittent      dextrose 40% Gel Oral PRN  dextrose 50% Injectable IV Push  dextrose 50% Injectable IV Push  dextrose 50% Injectable IV Push  glucagon  Injectable IntraMuscular PRN  insulin lispro (HumaLOG) corrective regimen sliding scale SubCutaneous      acetaminophen  Suppository .. Rectal PRN  HYDROmorphone  Injectable IV Push PRN  HYDROmorphone  Injectable IV Push PRN      enoxaparin Injectable SubCutaneous    pantoprazole  Injectable IV Push      dextrose 5%. IV Continuous  lactated ringers. IV Continuous      chlorhexidine 2% Cloths Topical    nicotine -  14 mG/24Hr(s) Patch Transdermal                          9.5    11.91 )-----------( 233      ( 13 May 2019 04:53 )             30.0           144  |  111<H>  |  26<H>  ----------------------------<  141<H>  4.2   |  26  |  1.60<H>    Ca    8.1<L>      13 May 2019 04:53  Phos  3.0       Mg     2.3         TPro  6.1  /  Alb  2.3<L>  /  TBili  0.3  /  DBili  .20  /  AST  25  /  ALT  18  /  AlkPhos  80        CARDIAC MARKERS ( 13 May 2019 04:53 )  x     / x     / 307 U/L / x     / x            Urinalysis Basic - ( 12 May 2019 21:26 )    Color: Yellow / Appearance: Clear / S.015 / pH: x  Gluc: x / Ketone: Small  / Bili: Negative / Urobili: Negative   Blood: x / Protein: 25 mg/dL / Nitrite: Negative   Leuk Esterase: Trace / RBC: 0-2 /HPF / WBC 6-10   Sq Epi: x / Non Sq Epi: Occasional / Bacteria: Few      .Sputum Sputum --   Few polymorphonuclear leukocytes per low power field  Few Squamous epithelial cells per low power field  No organisms seen per oil power field  @ 01:10  .Blood Blood-Peripheral   No growth to date. --  @ 12:13    CENTRAL LINE: N                MICHAEL: MARJORIE                     DATE INSERTED:      A-LINE: N    GLOBAL ISSUE/BEST PRACTICE  Analgesia: PRN dilaudid and tylenol  Sedation: N  HOB elevation: Y  Stress ulcer prophylaxis:  IV protonix 40 BID  VTE prophylaxis: Lovenox 40   Glycemic control: moderate dose sliding scale  Nutrition: NPO    CODE STATUS: Full code  GOC discussion: N

## 2019-05-13 NOTE — CONSULT NOTE ADULT - ASSESSMENT
Patient is a 48y old  Male who presents with a chief complaint of perforated stomach ulcer (13 May 2019 13:25) seen by podiatry for left foot medial hallux G2 DFU

## 2019-05-13 NOTE — PHYSICAL THERAPY INITIAL EVALUATION ADULT - BALANCE DISTURBANCE, IDENTIFIED IMPAIRMENT CONTRIBUTE, REHAB EVAL
pain/decreased strength/impaired coordination/impaired postural control/impaired motor control/decreased sensation

## 2019-05-13 NOTE — PROGRESS NOTE ADULT - ATTENDING COMMENTS
48M PMH HTN, DM2 (not on meds), and diabetic neuropathy who presents with perforated gastric antral ulcer with chemical peritonitis, underwent emergent omentopexy and plication for perforated antral ulcer, POD 2. He also has mild OBI that is improving.    --Neuro: pain control with prn Tylenol and Dilaudid   --Cardiac: HD stable, continue LR@75/hr until tolerates diet  --Pulm: atelectasis, continue pain control, incentive spirometry, out of bed to chair, ambulate with assistance. Supplemental oxygen with NC, goal SpO2>90%. BiPAP qhs prn  --GI: keep NGT to suction, NPO, diet per sx, JO with serosanguinous drainage. Continue PPI bid for perforated ulcer   --Renal: improving OBI, decrease LR to 75/hr. Urinary retention s/p jacques replacement 5/13. Will re-assess regarding removing jacques when more ambulatory  --ID: on Zosyn, f/u BCx. UA with only 6-10 wbc and trace LE. Sputum culture negative. F/up podiatry regarding left foot medial hallux diabetic foot ulcer with purulent drainage. F/up x-rays and local wound care  --Endo: continue insulin coverage scale, A1C 8.0  - Heme: stable H/H, continue Lovenox for DVT ppx  --Dispo: full code, discussed with patient at bedside, stable for transfer to [facility] with continuous pulse ox

## 2019-05-13 NOTE — PHYSICAL THERAPY INITIAL EVALUATION ADULT - IMPAIRMENTS FOUND, PT EVAL
sensory integrity/aerobic capacity/endurance/fine motor/gait, locomotion, and balance/integumentary integrity

## 2019-05-13 NOTE — PHYSICAL THERAPY INITIAL EVALUATION ADULT - ASSISTIVE DEVICE FOR TRANSFER: GAIT, REHAB EVAL
rolling walker/SaO2 appears to desaturate to 85% room air and improved to 92% /c 3-4 L of O2 in less than 10 seconds

## 2019-05-13 NOTE — PHYSICAL THERAPY INITIAL EVALUATION ADULT - TRANSFER SAFETY CONCERNS NOTED: SIT/STAND, REHAB EVAL
decreased proprioception/decreased step length/decreased weight-shifting ability/losing balance/decreased balance during turns/decreased safety awareness/decreased sequencing ability

## 2019-05-13 NOTE — PHYSICAL THERAPY INITIAL EVALUATION ADULT - RANGE OF MOTION EXAMINATION, REHAB EVAL
bilateral upper extremity ROM was WFL (within functional limits)/bilateral lower extremity ROM was WFL (within functional limits)/right ankle PROM WFL but some gastroc tightness/deficits as listed below

## 2019-05-13 NOTE — PROGRESS NOTE ADULT - SUBJECTIVE AND OBJECTIVE BOX
STATUS POST:  Minh patch secondary to perforated peptic ulcer    POST OPERATIVE DAY #:     SUBJECTIVE:  Patient seen and examined at bedside.  No overnight events.  Patient with no new complaints at this time, NPO/NGT, denies flatus and bowel movement.  Patient denies any fevers, chills, chest pain, shortness of breath, abdominal pain, nausea, vomiting or diarrhea.    Vital Signs Last 24 Hrs  T(C): 37.1 (13 May 2019 07:42), Max: 38.6 (12 May 2019 21:00)  T(F): 98.7 (13 May 2019 07:42), Max: 101.4 (12 May 2019 21:00)  HR: 75 (13 May 2019 11:00) (74 - 92)  BP: 145/68 (13 May 2019 11:00) (117/61 - 176/79)  BP(mean): 98 (13 May 2019 11:00) (84 - 113)  RR: 19 (13 May 2019 11:00) (14 - 27)  SpO2: 96% (13 May 2019 11:00) (93% - 97%)    PHYSICAL EXAM:  GENERAL: No acute distress, well-developed  HEAD:  Atraumatic, Normocephalic  ABDOMEN: Soft, mildly-tender to deep plapation, mildly-distended; absent bowel sounds. midline incision clean dry and intact. JO drain with serosanguineous fluid.  NEUROLOGY: A&O x 3, no focal deficits    I&O's Summary    12 May 2019 07:  -  13 May 2019 07:00  --------------------------------------------------------  IN: 2850 mL / OUT: 2350 mL / NET: 500 mL    13 May 2019 07:  -  13 May 2019 11:42  --------------------------------------------------------  IN: 400 mL / OUT: 700 mL / NET: -300 mL      I&O's Detail    12 May 2019 07:  -  13 May 2019 07:00  --------------------------------------------------------  IN:    lactated ringers.: 2100 mL    lactated ringers.: 450 mL    Solution: 300 mL  Total IN: 2850 mL    OUT:    Bulb: 70 mL    Indwelling Catheter - Urethral: 880 mL    Intermittent Catheterization - Urethral: 1260 mL    Nasoenteral Tube: 140 mL  Total OUT: 2350 mL    Total NET: 500 mL      13 May 2019 07:01  -  13 May 2019 11:42  --------------------------------------------------------  IN:    lactated ringers.: 400 mL  Total IN: 400 mL    OUT:    Indwelling Catheter - Urethral: 700 mL  Total OUT: 700 mL    Total NET: -300 mL        MEDICATIONS  (STANDING):  chlorhexidine 2% Cloths 1 Application(s) Topical daily  dextrose 5%. 1000 milliLiter(s) (50 mL/Hr) IV Continuous <Continuous>  dextrose 50% Injectable 12.5 Gram(s) IV Push once  dextrose 50% Injectable 25 Gram(s) IV Push once  dextrose 50% Injectable 25 Gram(s) IV Push once  enoxaparin Injectable 40 milliGRAM(s) SubCutaneous every 24 hours  insulin lispro (HumaLOG) corrective regimen sliding scale   SubCutaneous every 6 hours  lactated ringers. 1000 milliLiter(s) (100 mL/Hr) IV Continuous <Continuous>  nicotine -  14 mG/24Hr(s) Patch 1 patch Transdermal daily  pantoprazole  Injectable 40 milliGRAM(s) IV Push two times a day  piperacillin/tazobactam IVPB. 3.375 Gram(s) IV Intermittent every 8 hours    MEDICATIONS  (PRN):  acetaminophen  Suppository .. 650 milliGRAM(s) Rectal every 6 hours PRN Temp greater or equal to 38C (100.4F)  dextrose 40% Gel 15 Gram(s) Oral once PRN Blood Glucose LESS THAN 70 milliGRAM(s)/deciliter  glucagon  Injectable 1 milliGRAM(s) IntraMuscular once PRN Glucose LESS THAN 70 milligrams/deciliter  HYDROmorphone  Injectable 0.5 milliGRAM(s) IV Push every 4 hours PRN Moderate Pain (4 - 6)  HYDROmorphone  Injectable 1 milliGRAM(s) IV Push every 4 hours PRN Severe Pain (7 - 10)    LABS:                        9.5    11.91 )-----------( 233      ( 13 May 2019 04:53 )             30.0     -    144  |  111<H>  |  26<H>  ----------------------------<  141<H>  4.2   |  26  |  1.60<H>    Ca    8.1<L>      13 May 2019 04:53  Phos  3.0       Mg     2.3         TPro  6.1  /  Alb  2.3<L>  /  TBili  0.3  /  DBili  .20  /  AST  25  /  ALT  18  /  AlkPhos  80        Urinalysis Basic - ( 12 May 2019 21:26 )    Color: Yellow / Appearance: Clear / S.015 / pH: x  Gluc: x / Ketone: Small  / Bili: Negative / Urobili: Negative   Blood: x / Protein: 25 mg/dL / Nitrite: Negative   Leuk Esterase: Trace / RBC: 0-2 /HPF / WBC 6-10   Sq Epi: x / Non Sq Epi: Occasional / Bacteria: Few      RADIOLOGY & ADDITIONAL STUDIES:    ASSESSMENT    48y Male POD 2 s/p omentopexy and plication of gastric ulcer currently in the ICU clinically stable.    PLAN  - Discussed with Dr. Mejia  - Dressing change today, will remove packing   - incentive spirometer  - pain control  - OOB  - JO drain 70/24hrs serosanguineous  - NPO, NGT (140/24hrs) , IVF   - serial abdominal exams, monitor return to GI function  - labs in am    Surgical Team Contact Information  Spectralink: Ext: [phone] or [phone]  Pager: [phone]

## 2019-05-13 NOTE — PHYSICAL THERAPY INITIAL EVALUATION ADULT - IMPAIRMENTS CONTRIBUTING TO GAIT DEVIATIONS, PT EVAL
impaired motor control/impaired balance/impaired coordination/decreased flexibility/decreased sensation/decreased strength/impaired postural control

## 2019-05-13 NOTE — PHYSICAL THERAPY INITIAL EVALUATION ADULT - GENERAL OBSERVATIONS, REHAB EVAL
Pt rec'd sleeping in ICU bed /c NG tube present, ICU monitors intact, IV and NAD or c/o. Pt however reported pain to abdominal site 8/10 but was agreeable to work /c PT during PT eval. RN Emma Law made aware and to medicate pt. Pt cooperative /c PT eval.

## 2019-05-13 NOTE — PHYSICAL THERAPY INITIAL EVALUATION ADULT - IMPAIRED TRANSFERS: SIT/STAND, REHAB EVAL
cognition/narrow base of support/pain/impaired postural control/impaired coordination/impaired motor control/impaired sensory feedback/decreased strength/**pt /c neuropathies/impaired balance

## 2019-05-13 NOTE — PHYSICAL THERAPY INITIAL EVALUATION ADULT - ADDITIONAL COMMENTS
Pt lives at home in a private house /c a frined and 3 ALBINO and no rails through the gar. Pt has 1 flight of steps inside to her bedroom /c rails. Pt was independent /c all functional mobility and ADLs prior to admission. Pt drives and does have a rolling walker, single axis cane and possible shower chair. Pt does not use these adaptive or assistive devices prior to admission. Pt states her  is going through his own medical issues and is unable to care for her at home presently. Pt children live [facility] and out of state. Pt lives at home in a private house /c a friend and 3 ALBINO and no rails through the gar. Pt has 1 flight of steps inside to her bedroom /c rails. Pt was independent /c all functional mobility and ADLs prior to admission. Pt drives. Pt does not own or use any adaptive or assistive devices.

## 2019-05-14 LAB
ANION GAP SERPL CALC-SCNC: 8 MMOL/L — SIGNIFICANT CHANGE UP (ref 5–17)
BUN SERPL-MCNC: 24 MG/DL — HIGH (ref 7–23)
CALCIUM SERPL-MCNC: 8.2 MG/DL — LOW (ref 8.5–10.1)
CHLORIDE SERPL-SCNC: 111 MMOL/L — HIGH (ref 96–108)
CO2 SERPL-SCNC: 28 MMOL/L — SIGNIFICANT CHANGE UP (ref 22–31)
CREAT SERPL-MCNC: 1.4 MG/DL — HIGH (ref 0.5–1.3)
CULTURE RESULTS: NO GROWTH — SIGNIFICANT CHANGE UP
CULTURE RESULTS: SIGNIFICANT CHANGE UP
GLUCOSE SERPL-MCNC: 132 MG/DL — HIGH (ref 70–99)
HCT VFR BLD CALC: 28.5 % — LOW (ref 39–50)
HGB BLD-MCNC: 9.2 G/DL — LOW (ref 13–17)
MAGNESIUM SERPL-MCNC: 2 MG/DL — SIGNIFICANT CHANGE UP (ref 1.6–2.6)
MCHC RBC-ENTMCNC: 31.6 PG — SIGNIFICANT CHANGE UP (ref 27–34)
MCHC RBC-ENTMCNC: 32.3 GM/DL — SIGNIFICANT CHANGE UP (ref 32–36)
MCV RBC AUTO: 97.9 FL — SIGNIFICANT CHANGE UP (ref 80–100)
NRBC # BLD: 0 /100 WBCS — SIGNIFICANT CHANGE UP (ref 0–0)
PHOSPHATE SERPL-MCNC: 2.6 MG/DL — SIGNIFICANT CHANGE UP (ref 2.5–4.5)
PLATELET # BLD AUTO: 234 K/UL — SIGNIFICANT CHANGE UP (ref 150–400)
POTASSIUM SERPL-MCNC: 3.8 MMOL/L — SIGNIFICANT CHANGE UP (ref 3.5–5.3)
POTASSIUM SERPL-SCNC: 3.8 MMOL/L — SIGNIFICANT CHANGE UP (ref 3.5–5.3)
RBC # BLD: 2.91 M/UL — LOW (ref 4.2–5.8)
RBC # FLD: 12.4 % — SIGNIFICANT CHANGE UP (ref 10.3–14.5)
SODIUM SERPL-SCNC: 147 MMOL/L — HIGH (ref 135–145)
SPECIMEN SOURCE: SIGNIFICANT CHANGE UP
SPECIMEN SOURCE: SIGNIFICANT CHANGE UP
SURGICAL PATHOLOGY STUDY: SIGNIFICANT CHANGE UP
WBC # BLD: 10.05 K/UL — SIGNIFICANT CHANGE UP (ref 3.8–10.5)
WBC # FLD AUTO: 10.05 K/UL — SIGNIFICANT CHANGE UP (ref 3.8–10.5)

## 2019-05-14 PROCEDURE — 99233 SBSQ HOSP IP/OBS HIGH 50: CPT | Mod: GC

## 2019-05-14 RX ADMIN — PANTOPRAZOLE SODIUM 40 MILLIGRAM(S): 20 TABLET, DELAYED RELEASE ORAL at 18:14

## 2019-05-14 RX ADMIN — HYDROMORPHONE HYDROCHLORIDE 1 MILLIGRAM(S): 2 INJECTION INTRAMUSCULAR; INTRAVENOUS; SUBCUTANEOUS at 17:45

## 2019-05-14 RX ADMIN — HYDROMORPHONE HYDROCHLORIDE 1 MILLIGRAM(S): 2 INJECTION INTRAMUSCULAR; INTRAVENOUS; SUBCUTANEOUS at 22:35

## 2019-05-14 RX ADMIN — PIPERACILLIN AND TAZOBACTAM 25 GRAM(S): 4; .5 INJECTION, POWDER, LYOPHILIZED, FOR SOLUTION INTRAVENOUS at 05:08

## 2019-05-14 RX ADMIN — CHLORHEXIDINE GLUCONATE 1 APPLICATION(S): 213 SOLUTION TOPICAL at 12:10

## 2019-05-14 RX ADMIN — HYDROMORPHONE HYDROCHLORIDE 1 MILLIGRAM(S): 2 INJECTION INTRAMUSCULAR; INTRAVENOUS; SUBCUTANEOUS at 01:31

## 2019-05-14 RX ADMIN — HYDROMORPHONE HYDROCHLORIDE 1 MILLIGRAM(S): 2 INJECTION INTRAMUSCULAR; INTRAVENOUS; SUBCUTANEOUS at 01:53

## 2019-05-14 RX ADMIN — Medication 1 PATCH: at 07:00

## 2019-05-14 RX ADMIN — HYDROMORPHONE HYDROCHLORIDE 1 MILLIGRAM(S): 2 INJECTION INTRAMUSCULAR; INTRAVENOUS; SUBCUTANEOUS at 06:46

## 2019-05-14 RX ADMIN — ENOXAPARIN SODIUM 40 MILLIGRAM(S): 100 INJECTION SUBCUTANEOUS at 18:14

## 2019-05-14 RX ADMIN — Medication 2: at 18:31

## 2019-05-14 RX ADMIN — PIPERACILLIN AND TAZOBACTAM 25 GRAM(S): 4; .5 INJECTION, POWDER, LYOPHILIZED, FOR SOLUTION INTRAVENOUS at 21:29

## 2019-05-14 RX ADMIN — PANTOPRAZOLE SODIUM 40 MILLIGRAM(S): 20 TABLET, DELAYED RELEASE ORAL at 05:08

## 2019-05-14 RX ADMIN — SODIUM CHLORIDE 75 MILLILITER(S): 9 INJECTION, SOLUTION INTRAVENOUS at 12:10

## 2019-05-14 RX ADMIN — Medication 1 PATCH: at 13:20

## 2019-05-14 RX ADMIN — Medication 1 PATCH: at 19:00

## 2019-05-14 RX ADMIN — HYDROMORPHONE HYDROCHLORIDE 1 MILLIGRAM(S): 2 INJECTION INTRAMUSCULAR; INTRAVENOUS; SUBCUTANEOUS at 12:17

## 2019-05-14 RX ADMIN — HYDROMORPHONE HYDROCHLORIDE 1 MILLIGRAM(S): 2 INJECTION INTRAMUSCULAR; INTRAVENOUS; SUBCUTANEOUS at 06:32

## 2019-05-14 RX ADMIN — HYDROMORPHONE HYDROCHLORIDE 1 MILLIGRAM(S): 2 INJECTION INTRAMUSCULAR; INTRAVENOUS; SUBCUTANEOUS at 12:47

## 2019-05-14 RX ADMIN — Medication 1 PATCH: at 13:32

## 2019-05-14 RX ADMIN — HYDROMORPHONE HYDROCHLORIDE 1 MILLIGRAM(S): 2 INJECTION INTRAMUSCULAR; INTRAVENOUS; SUBCUTANEOUS at 17:30

## 2019-05-14 RX ADMIN — PIPERACILLIN AND TAZOBACTAM 25 GRAM(S): 4; .5 INJECTION, POWDER, LYOPHILIZED, FOR SOLUTION INTRAVENOUS at 13:32

## 2019-05-14 NOTE — CHART NOTE - NSCHARTNOTEFT_GEN_A_CORE
Assessment: patient seen with NGT remains NPO status post rainer patch . patient seen sleeping this AM    Factors impacting intake: [ ] none [ ] nausea  [ ] vomiting [ ] diarrhea [ ] constipation  [ ]chewing problems [ ] swallowing issues  [x ] other: NPO status post OR    Diet Presciption: Diet, NPO (05-13-19 @ 13:38)        Current Weight: Weight (kg): 81.6 (05-10 @ 21:47)  % Weight Change    Pertinent Medications: MEDICATIONS  (STANDING):  chlorhexidine 2% Cloths 1 Application(s) Topical daily  dextrose 5%. 1000 milliLiter(s) (50 mL/Hr) IV Continuous <Continuous>  dextrose 50% Injectable 12.5 Gram(s) IV Push once  dextrose 50% Injectable 25 Gram(s) IV Push once  dextrose 50% Injectable 25 Gram(s) IV Push once  enoxaparin Injectable 40 milliGRAM(s) SubCutaneous every 24 hours  insulin lispro (HumaLOG) corrective regimen sliding scale   SubCutaneous every 6 hours  lactated ringers. 1000 milliLiter(s) (75 mL/Hr) IV Continuous <Continuous>  nicotine -  14 mG/24Hr(s) Patch 1 patch Transdermal daily  pantoprazole  Injectable 40 milliGRAM(s) IV Push two times a day  piperacillin/tazobactam IVPB. 3.375 Gram(s) IV Intermittent every 8 hours    MEDICATIONS  (PRN):  acetaminophen  Suppository .. 650 milliGRAM(s) Rectal every 6 hours PRN Temp greater or equal to 38C (100.4F)  dextrose 40% Gel 15 Gram(s) Oral once PRN Blood Glucose LESS THAN 70 milliGRAM(s)/deciliter  glucagon  Injectable 1 milliGRAM(s) IntraMuscular once PRN Glucose LESS THAN 70 milligrams/deciliter  HYDROmorphone  Injectable 0.5 milliGRAM(s) IV Push every 4 hours PRN Moderate Pain (4 - 6)  HYDROmorphone  Injectable 1 milliGRAM(s) IV Push every 4 hours PRN Severe Pain (7 - 10)    Pertinent Labs: 05-14 Na147 mmol/L<H> Glu 132 mg/dL<H> K+ 3.8 mmol/L Cr  1.40 mg/dL<H> BUN 24 mg/dL<H> 05-14 Phos 2.6 mg/dL 05-13 Alb 2.3 g/dL<L> 05-12 [id number] 8.0 %<H>     CAPILLARY BLOOD GLUCOSE      POCT Blood Glucose.: 136 mg/dL (14 May 2019 05:12)  POCT Blood Glucose.: 153 mg/dL (13 May 2019 23:11)  POCT Blood Glucose.: 138 mg/dL (13 May 2019 17:04)  POCT Blood Glucose.: 166 mg/dL (13 May 2019 11:42)    Skin: micah 17 left foot venous ulcer    Estimated Needs:   [x ] no change since previous assessment  [ ] recalculated:     Previous Nutrition Diagnosis:   [x ] Altered GI Function     Nutrition Diagnosis is [x ] ongoing  [ ] resolved [ ] not applicable     New Nutrition Diagnosis: [ x] not applicable       Interventions:   Recommend  [ ] Change Diet To:  [ ] Nutrition Supplement  [ ] Nutrition Support  [x ] Other: as appropriate advance to clear liquids to consistent cho dash tlc will follow NPO status    Monitoring and Evaluation:    [ x ] Tolerance to diet prescription [ x ] weights [ x ] labs[ x ] follow up per protocol  [ ] other:

## 2019-05-14 NOTE — PROGRESS NOTE ADULT - SUBJECTIVE AND OBJECTIVE BOX
Patient is a 48y old  Male who presents with a chief complaint of perforated stomach ulcer (13 May 2019 15:15)    Interval History: No significant events overnight. Patient endorses continued but improving abdominal pain. Endorses flatus, denies having a BM, fever, shortness of breath, chest pain, nausea/vomiting.     REVIEW OF SYSTEMS  Constitutional: No fever, chills, fatigue  Neuro: No headache, numbness, weakness  Resp: No cough, wheezing, shortness of breath  CVS: No chest pain, palpitations, leg swelling  GI: + abdominal pain, no nausea, vomiting, diarrhea   : No dysuria, frequency, incontinence  Skin: No itching, burning, rashes, or lesions   Msk: No joint pain or swelling  Psych: No depression, anxiety, mood swings  Heme: No bleeding    T(F): 99.1 (19 @ 08:00), Max: 99.5 (19 @ 20:30)  HR: 72 (19 @ 08:00) (61 - 88)  BP: 161/77 (19 @ 08:00) (135/64 - 176/79)  RR: 22 (19 @ 08:00) (14 - 32)  SpO2: 97% (19 @ 08:00) (92% - 99%)  Wt(kg): --    CAPILLARY BLOOD GLUCOSE    POCT Blood Glucose.: 136 mg/dL (14 May 2019 05:12)  POCT Blood Glucose.: 153 mg/dL (13 May 2019 23:11)  POCT Blood Glucose.: 138 mg/dL (13 May 2019 17:04)  POCT Blood Glucose.: 166 mg/dL (13 May 2019 11:42)    I&O's Summary     @ 07:01  -   @ 07:00  --------------------------------------------------------  IN: 2350 mL / OUT: 3630 mL / NET: -1280 mL      PHYSICAL EXAM  General:   CNS:   HEENT:   Resp:   CVS:   Abd:   Ext:   Skin:     MEDICATIONS  piperacillin/tazobactam IVPB. IV Intermittent      dextrose 40% Gel Oral PRN  dextrose 50% Injectable IV Push  dextrose 50% Injectable IV Push  dextrose 50% Injectable IV Push  glucagon  Injectable IntraMuscular PRN  insulin lispro (HumaLOG) corrective regimen sliding scale SubCutaneous      acetaminophen  Suppository .. Rectal PRN  HYDROmorphone  Injectable IV Push PRN  HYDROmorphone  Injectable IV Push PRN      enoxaparin Injectable SubCutaneous    pantoprazole  Injectable IV Push      dextrose 5%. IV Continuous  lactated ringers. IV Continuous      chlorhexidine 2% Cloths Topical    nicotine -  14 mG/24Hr(s) Patch Transdermal                          9.2    10.05 )-----------( 234      ( 14 May 2019 05:30 )             28.5       05-14    147<H>  |  111<H>  |  24<H>  ----------------------------<  132<H>  3.8   |  28  |  1.40<H>    Ca    8.2<L>      14 May 2019 05:30  Phos  2.6       Mg     2.0         TPro  6.1  /  Alb  2.3<L>  /  TBili  0.3  /  DBili  .20  /  AST  25  /  ALT  18  /  AlkPhos  80  05-13      CARDIAC MARKERS ( 13 May 2019 04:53 )  x     / x     / 307 U/L / x     / x            Urinalysis Basic - ( 12 May 2019 21:26 )    Color: Yellow / Appearance: Clear / S.015 / pH: x  Gluc: x / Ketone: Small  / Bili: Negative / Urobili: Negative   Blood: x / Protein: 25 mg/dL / Nitrite: Negative   Leuk Esterase: Trace / RBC: 0-2 /HPF / WBC 6-10   Sq Epi: x / Non Sq Epi: Occasional / Bacteria: Few      .Urine Catheterized   No growth --  @ 09:26  .Sputum Sputum   Normal Respiratory Sophia present   Few polymorphonuclear leukocytes per low power field  Few Squamous epithelial cells per low power field  No organisms seen per oil power field  @ 01:10  .Blood Blood-Peripheral   No growth to date. --  @ 12:13        Radiology: ***  Bedside lung ultrasound: ***  Bedside ECHO: ***    CENTRAL LINE: Y/N          DATE INSERTED:              REMOVE: Y/N  RODRIGUEZ: Y/N                        DATE INSERTED:              REMOVE: Y/N  A-LINE: Y/N                       DATE INSERTED:              REMOVE: Y/N    GLOBAL ISSUE/BEST PRACTICE  Analgesia:   Sedation:   HOB elevation: Y  Stress ulcer prophylaxis:   VTE prophylaxis:   Glycemic control:   Nutrition:     CODE STATUS: ***  [facility] discussion: Y Patient is a 48y old  Male who presents with a chief complaint of perforated stomach ulcer (13 May 2019 15:15)    Interval History: No significant events overnight. Patient endorses continued but improving abdominal pain. Endorses flatus, denies having a BM, fever, shortness of breath, chest pain, nausea/vomiting.     REVIEW OF SYSTEMS  Constitutional: No fever, chills, fatigue  Neuro: No headache, numbness, weakness  Resp: No cough, wheezing, shortness of breath  CVS: No chest pain, palpitations, leg swelling  GI: + abdominal pain, no nausea, vomiting, diarrhea   : No dysuria, frequency, incontinence  Skin: No itching, burning, rashes, or lesions   Msk: No joint pain or swelling  Psych: No depression, anxiety, mood swings  Heme: No bleeding    T(F): 99.1 (19 @ 08:00), Max: 99.5 (19 @ 20:30)  HR: 72 (19 @ 08:00) (61 - 88)  BP: 161/77 (19 @ 08:00) (135/64 - 176/79)  RR: 22 (19 @ 08:00) (14 - 32)  SpO2: 97% (19 @ 08:00) (92% - 99%)  Wt(kg): --    CAPILLARY BLOOD GLUCOSE    POCT Blood Glucose.: 136 mg/dL (14 May 2019 05:12)  POCT Blood Glucose.: 153 mg/dL (13 May 2019 23:11)  POCT Blood Glucose.: 138 mg/dL (13 May 2019 17:04)  POCT Blood Glucose.: 166 mg/dL (13 May 2019 11:42)    I&O's Summary     @ 07:01  -   @ 07:00  --------------------------------------------------------  IN: 2350 mL / OUT: 3630 mL / NET: -1280 mL      PHYSICAL EXAM  General: Lying uncomfortably in bed  CNS: A&Ox3, no focal deficits  HEENT: CTAB, no wheezes, rales or rhonchi  Resp:   CVS:   Abd:   Ext:   Skin:     MEDICATIONS  piperacillin/tazobactam IVPB. IV Intermittent      dextrose 40% Gel Oral PRN  dextrose 50% Injectable IV Push  dextrose 50% Injectable IV Push  dextrose 50% Injectable IV Push  glucagon  Injectable IntraMuscular PRN  insulin lispro (HumaLOG) corrective regimen sliding scale SubCutaneous      acetaminophen  Suppository .. Rectal PRN  HYDROmorphone  Injectable IV Push PRN  HYDROmorphone  Injectable IV Push PRN      enoxaparin Injectable SubCutaneous    pantoprazole  Injectable IV Push      dextrose 5%. IV Continuous  lactated ringers. IV Continuous      chlorhexidine 2% Cloths Topical    nicotine -  14 mG/24Hr(s) Patch Transdermal                          9.2    10.05 )-----------( 234      ( 14 May 2019 05:30 )             28.5       -    147<H>  |  111<H>  |  24<H>  ----------------------------<  132<H>  3.8   |  28  |  1.40<H>    Ca    8.2<L>      14 May 2019 05:30  Phos  2.6       Mg     2.0         TPro  6.1  /  Alb  2.3<L>  /  TBili  0.3  /  DBili  .20  /  AST  25  /  ALT  18  /  AlkPhos  80  05-13      CARDIAC MARKERS ( 13 May 2019 04:53 )  x     / x     / 307 U/L / x     / x            Urinalysis Basic - ( 12 May 2019 21:26 )    Color: Yellow / Appearance: Clear / S.015 / pH: x  Gluc: x / Ketone: Small  / Bili: Negative / Urobili: Negative   Blood: x / Protein: 25 mg/dL / Nitrite: Negative   Leuk Esterase: Trace / RBC: 0-2 /HPF / WBC 6-10   Sq Epi: x / Non Sq Epi: Occasional / Bacteria: Few      .Urine Catheterized   No growth --  @ 09:26  .Sputum Sputum   Normal Respiratory Sophia present   Few polymorphonuclear leukocytes per low power field  Few Squamous epithelial cells per low power field  No organisms seen per oil power field  @ 01:10  .Blood Blood-Peripheral   No growth to date. --  @ 12:13        Radiology: ***  Bedside lung ultrasound: ***  Bedside ECHO: ***    CENTRAL LINE: Y/N          DATE INSERTED:              REMOVE: Y/N  RODRIGUEZ: Y/N                        DATE INSERTED:              REMOVE: Y/N  A-LINE: Y/N                       DATE INSERTED:              REMOVE: Y/N    GLOBAL ISSUE/BEST PRACTICE  Analgesia:   Sedation:   HOB elevation: Y  Stress ulcer prophylaxis:   VTE prophylaxis:   Glycemic control:   Nutrition:     CODE STATUS: ***  [facility] discussion: Y Patient is a 48y old  Male who presents with a chief complaint of perforated stomach ulcer (13 May 2019 15:15)    Interval History: No significant events overnight. Patient endorses continued but improving abdominal pain. Endorses flatus, denies having a BM, fever, shortness of breath, chest pain, nausea/vomiting.     REVIEW OF SYSTEMS  Constitutional: No fever, chills, fatigue  Neuro: No headache, numbness, weakness  Resp: No cough, wheezing, shortness of breath  CVS: No chest pain, palpitations, leg swelling  GI: + abdominal pain, no nausea, vomiting, diarrhea   : No dysuria, frequency, incontinence  Skin: No itching, burning, rashes, or lesions   Msk: No joint pain or swelling  Psych: No depression, anxiety, mood swings  Heme: No bleeding    T(F): 99.1 (19 @ 08:00), Max: 99.5 (19 @ 20:30)  HR: 72 (19 @ 08:00) (61 - 88)  BP: 161/77 (19 @ 08:00) (135/64 - 176/79)  RR: 22 (19 @ 08:00) (14 - 32)  SpO2: 97% (19 @ 08:00) (92% - 99%)  Wt(kg): --    CAPILLARY BLOOD GLUCOSE    POCT Blood Glucose.: 136 mg/dL (14 May 2019 05:12)  POCT Blood Glucose.: 153 mg/dL (13 May 2019 23:11)  POCT Blood Glucose.: 138 mg/dL (13 May 2019 17:04)  POCT Blood Glucose.: 166 mg/dL (13 May 2019 11:42)    I&O's Summary     @ 07:01  -   @ 07:00  --------------------------------------------------------  IN: 2350 mL / OUT: 3630 mL / NET: -1280 mL      PHYSICAL EXAM  General: Lying uncomfortably in bed  CNS: A&Ox3, no focal deficits  HEENT: +NGT draining dark green secretions  Resp: CTAB, no wheezes, rales or rhonchi  CVS: RRR, +S1, S2 no murmurs, rubs or gallops  Abd: surgical bandage intact, JO draining serosanguinous fluid, hypoactive bowel sounds, diffusely tender to palpation  Ext: + open ulcer on medial left great toe, currently bandaged    MEDICATIONS  piperacillin/tazobactam IVPB. IV Intermittent      dextrose 40% Gel Oral PRN  dextrose 50% Injectable IV Push  dextrose 50% Injectable IV Push  dextrose 50% Injectable IV Push  glucagon  Injectable IntraMuscular PRN  insulin lispro (HumaLOG) corrective regimen sliding scale SubCutaneous      acetaminophen  Suppository .. Rectal PRN  HYDROmorphone  Injectable IV Push PRN  HYDROmorphone  Injectable IV Push PRN      enoxaparin Injectable SubCutaneous    pantoprazole  Injectable IV Push      dextrose 5%. IV Continuous  lactated ringers. IV Continuous      chlorhexidine 2% Cloths Topical    nicotine -  14 mG/24Hr(s) Patch Transdermal                          9.2    10.05 )-----------( 234      ( 14 May 2019 05:30 )             28.5           147<H>  |  111<H>  |  24<H>  ----------------------------<  132<H>  3.8   |  28  |  1.40<H>    Ca    8.2<L>      14 May 2019 05:30  Phos  2.6       Mg     2.0         TPro  6.1  /  Alb  2.3<L>  /  TBili  0.3  /  DBili  .20  /  AST  25  /  ALT  18  /  AlkPhos  80        CARDIAC MARKERS ( 13 May 2019 04:53 )  x     / x     / 307 U/L / x     / x            Urinalysis Basic - ( 12 May 2019 21:26 )    Color: Yellow / Appearance: Clear / S.015 / pH: x  Gluc: x / Ketone: Small  / Bili: Negative / Urobili: Negative   Blood: x / Protein: 25 mg/dL / Nitrite: Negative   Leuk Esterase: Trace / RBC: 0-2 /HPF / WBC 6-10   Sq Epi: x / Non Sq Epi: Occasional / Bacteria: Few      .Urine Catheterized   No growth --  @ 09:26  .Sputum Sputum   Normal Respiratory Sophia present   Few polymorphonuclear leukocytes per low power field  Few Squamous epithelial cells per low power field  No organisms seen per oil power field  @ 01:10  .Blood Blood-Peripheral   No growth to date. --  @ 12:13    CENTRAL LINE: N  RODRIGUEZ: Y/N                        DATE INSERTED:              REMOVE: Y/N  A-LINE: Y/N                       DATE INSERTED:              REMOVE: Y/N    GLOBAL ISSUE/BEST PRACTICE  Analgesia:   Sedation:   HOB elevation: Y  Stress ulcer prophylaxis:   VTE prophylaxis:   Glycemic control:   Nutrition:     CODE STATUS: ***  [facility] [place] discussion: Y Patient is a 48y old  Male who presents with a chief complaint of perforated stomach ulcer (13 May 2019 15:15)    Interval History: No significant events overnight. Patient endorses continued but improving abdominal pain. Endorses flatus, denies having a BM, fever, shortness of breath, chest pain, nausea/vomiting.     REVIEW OF SYSTEMS  Constitutional: No fever, chills, fatigue  Neuro: No headache, numbness, weakness  Resp: No cough, wheezing, shortness of breath  CVS: No chest pain, palpitations, leg swelling  GI: + abdominal pain, no nausea, vomiting, diarrhea   : No dysuria, frequency, incontinence  Skin: No itching, burning, rashes, or lesions   Msk: No joint pain or swelling  Psych: No depression, anxiety, mood swings  Heme: No bleeding    T(F): 99.1 (19 @ 08:00), Max: 99.5 (19 @ 20:30)  HR: 72 (19 @ 08:00) (61 - 88)  BP: 161/77 (19 @ 08:00) (135/64 - 176/79)  RR: 22 (19 @ 08:00) (14 - 32)  SpO2: 97% (19 @ 08:00) (92% - 99%)  Wt(kg): --    CAPILLARY BLOOD GLUCOSE    POCT Blood Glucose.: 136 mg/dL (14 May 2019 05:12)  POCT Blood Glucose.: 153 mg/dL (13 May 2019 23:11)  POCT Blood Glucose.: 138 mg/dL (13 May 2019 17:04)  POCT Blood Glucose.: 166 mg/dL (13 May 2019 11:42)    I&O's Summary     @ 07:01  -   @ 07:00  --------------------------------------------------------  IN: 2350 mL / OUT: 3630 mL / NET: -1280 mL      PHYSICAL EXAM  General: Lying uncomfortably in bed  CNS: A&Ox3, no focal deficits  HEENT: +NGT draining dark green secretions  Resp: CTAB, no wheezes, rales or rhonchi  CVS: RRR, +S1, S2 no murmurs, rubs or gallops  Abd: surgical bandage intact, JO draining serosanguinous fluid, hypoactive bowel sounds, diffusely tender to palpation  Ext: + open ulcer on medial left great toe, currently bandaged    MEDICATIONS  piperacillin/tazobactam IVPB. IV Intermittent      dextrose 40% Gel Oral PRN  dextrose 50% Injectable IV Push  dextrose 50% Injectable IV Push  dextrose 50% Injectable IV Push  glucagon  Injectable IntraMuscular PRN  insulin lispro (HumaLOG) corrective regimen sliding scale SubCutaneous      acetaminophen  Suppository .. Rectal PRN  HYDROmorphone  Injectable IV Push PRN  HYDROmorphone  Injectable IV Push PRN      enoxaparin Injectable SubCutaneous    pantoprazole  Injectable IV Push      dextrose 5%. IV Continuous  lactated ringers. IV Continuous      chlorhexidine 2% Cloths Topical    nicotine -  14 mG/24Hr(s) Patch Transdermal                          9.2    10.05 )-----------( 234      ( 14 May 2019 05:30 )             28.5           147<H>  |  111<H>  |  24<H>  ----------------------------<  132<H>  3.8   |  28  |  1.40<H>    Ca    8.2<L>      14 May 2019 05:30  Phos  2.6       Mg     2.0         TPro  6.1  /  Alb  2.3<L>  /  TBili  0.3  /  DBili  .20  /  AST  25  /  ALT  18  /  AlkPhos  80        CARDIAC MARKERS ( 13 May 2019 04:53 )  x     / x     / 307 U/L / x     / x            Urinalysis Basic - ( 12 May 2019 21:26 )    Color: Yellow / Appearance: Clear / S.015 / pH: x  Gluc: x / Ketone: Small  / Bili: Negative / Urobili: Negative   Blood: x / Protein: 25 mg/dL / Nitrite: Negative   Leuk Esterase: Trace / RBC: 0-2 /HPF / WBC 6-10   Sq Epi: x / Non Sq Epi: Occasional / Bacteria: Few      .Urine Catheterized   No growth --  @ 09:26  .Sputum Sputum   Normal Respiratory Sophia present   Few polymorphonuclear leukocytes per low power field  Few Squamous epithelial cells per low power field  No organisms seen per oil power field  @ 01:10  .Blood Blood-Peripheral   No growth to date. --  @ 12:13    CENTRAL LINE: N  RODRIGUEZ: Y                        DATE INSERTED:                A-LINE: N                        GLOBAL ISSUE/BEST PRACTICE  Analgesia: PRN dialudid and tylenol  Sedation: N  HOB elevation: Y  Stress ulcer prophylaxis: IV protonix BID  VTE prophylaxis: lovenox 40  Glycemic control: moderate dose sliding scale  Nutrition:     CODE STATUS: Full code Patient is a 48y old  Male who presents with a chief complaint of perforated stomach ulcer (13 May 2019 15:15)    Interval History: No significant events overnight. Patient endorses continued but improving abdominal pain. Endorses flatus, denies having a BM, fever, shortness of breath, chest pain, nausea/vomiting.     REVIEW OF SYSTEMS  Constitutional: No fever, chills, fatigue  Neuro: No headache, numbness, weakness  Resp: No cough, wheezing, shortness of breath  CVS: No chest pain, palpitations, leg swelling  GI: + abdominal pain, no nausea, vomiting, diarrhea   : No dysuria, frequency, incontinence  Skin: No itching, burning, rashes, or lesions   Msk: No joint pain or swelling  Psych: No depression, anxiety, mood swings  Heme: No bleeding    T(F): 99.1 (19 @ 08:00), Max: 99.5 (19 @ 20:30)  HR: 72 (19 @ 08:00) (61 - 88)  BP: 161/77 (19 @ 08:00) (135/64 - 176/79)  RR: 22 (19 @ 08:00) (14 - 32)  SpO2: 97% (19 @ 08:00) (92% - 99%)  Wt(kg): --    CAPILLARY BLOOD GLUCOSE    POCT Blood Glucose.: 136 mg/dL (14 May 2019 05:12)  POCT Blood Glucose.: 153 mg/dL (13 May 2019 23:11)  POCT Blood Glucose.: 138 mg/dL (13 May 2019 17:04)  POCT Blood Glucose.: 166 mg/dL (13 May 2019 11:42)    I&O's Summary     @ 07:01  -   @ 07:00  --------------------------------------------------------  IN: 2350 mL / OUT: 3630 mL / NET: -1280 mL      PHYSICAL EXAM  General: Lying uncomfortably in bed  CNS: A&Ox3, no focal deficits  HEENT: +NGT draining dark green secretions  Resp: CTAB, no wheezes, rales or rhonchi  CVS: RRR, +S1, S2 no murmurs, rubs or gallops  Abd: surgical bandage intact, JO draining serosanguinous fluid, hypoactive bowel sounds, diffusely tender to palpation  Ext: + open ulcer on medial left great toe, currently bandaged, +RUE swelling localized to hand and forearm    MEDICATIONS  piperacillin/tazobactam IVPB. IV Intermittent      dextrose 40% Gel Oral PRN  dextrose 50% Injectable IV Push  dextrose 50% Injectable IV Push  dextrose 50% Injectable IV Push  glucagon  Injectable IntraMuscular PRN  insulin lispro (HumaLOG) corrective regimen sliding scale SubCutaneous      acetaminophen  Suppository .. Rectal PRN  HYDROmorphone  Injectable IV Push PRN  HYDROmorphone  Injectable IV Push PRN      enoxaparin Injectable SubCutaneous    pantoprazole  Injectable IV Push      dextrose 5%. IV Continuous  lactated ringers. IV Continuous      chlorhexidine 2% Cloths Topical    nicotine -  14 mG/24Hr(s) Patch Transdermal                          9.2    10.05 )-----------( 234      ( 14 May 2019 05:30 )             28.5       05-14    147<H>  |  111<H>  |  24<H>  ----------------------------<  132<H>  3.8   |  28  |  1.40<H>    Ca    8.2<L>      14 May 2019 05:30  Phos  2.6     -  Mg     2.0     -14    TPro  6.1  /  Alb  2.3<L>  /  TBili  0.3  /  DBili  .20  /  AST  25  /  ALT  18  /  AlkPhos  80  05-13      CARDIAC MARKERS ( 13 May 2019 04:53 )  x     / x     / 307 U/L / x     / x            Urinalysis Basic - ( 12 May 2019 21:26 )    Color: Yellow / Appearance: Clear / S.015 / pH: x  Gluc: x / Ketone: Small  / Bili: Negative / Urobili: Negative   Blood: x / Protein: 25 mg/dL / Nitrite: Negative   Leuk Esterase: Trace / RBC: 0-2 /HPF / WBC 6-10   Sq Epi: x / Non Sq Epi: Occasional / Bacteria: Few      .Urine Catheterized   No growth --  @ 09:26  .Sputum Sputum   Normal Respiratory Sophia present   Few polymorphonuclear leukocytes per low power field  Few Squamous epithelial cells per low power field  No organisms seen per oil power field  @ 01:10  .Blood Blood-Peripheral   No growth to date. --  @ 12:13    CENTRAL LINE: N  RODRIGUEZ: Y                        DATE INSERTED:                A-LINE: N                        GLOBAL ISSUE/BEST PRACTICE  Analgesia: PRN dialudid and tylenol  Sedation: N  HOB elevation: Y  Stress ulcer prophylaxis: IV protonix BID  VTE prophylaxis: lovenox 40  Glycemic control: moderate dose sliding scale  Nutrition:     CODE STATUS: Full code Patient is a 48y old  Male who presents with a chief complaint of perforated stomach ulcer (13 May 2019 15:15)    Interval History: No significant events overnight. Patient endorses continued but improving abdominal pain. Endorses flatus, denies having a BM, fever, shortness of breath, chest pain, nausea/vomiting.     REVIEW OF SYSTEMS  Constitutional: No fever, chills, fatigue  Neuro: No headache, numbness, weakness  Resp: No cough, wheezing, shortness of breath  CVS: No chest pain, palpitations, leg swelling  GI: + abdominal pain, no nausea, vomiting, diarrhea   : No dysuria, frequency, incontinence  Skin: No itching, burning, rashes, or lesions   Msk: No joint pain or swelling  Psych: No depression, anxiety, mood swings  Heme: No bleeding    T(F): 99.1 (19 @ 08:00), Max: 99.5 (19 @ 20:30)  HR: 72 (19 @ 08:00) (61 - 88)  BP: 161/77 (19 @ 08:00) (135/64 - 176/79)  RR: 22 (19 @ 08:00) (14 - 32)  SpO2: 97% (19 @ 08:00) (92% - 99%)  Wt(kg): --    CAPILLARY BLOOD GLUCOSE    POCT Blood Glucose.: 136 mg/dL (14 May 2019 05:12)  POCT Blood Glucose.: 153 mg/dL (13 May 2019 23:11)  POCT Blood Glucose.: 138 mg/dL (13 May 2019 17:04)  POCT Blood Glucose.: 166 mg/dL (13 May 2019 11:42)    I&O's Summary     @ 07:01  -   @ 07:00  --------------------------------------------------------  IN: 2350 mL / OUT: 3630 mL / NET: -1280 mL      PHYSICAL EXAM  General: Lying uncomfortably in bed  CNS: A&Ox3, no focal deficits  HEENT: +NGT draining dark green secretions  Resp: CTAB, no wheezes, rales or rhonchi  CVS: RRR, +S1, S2 no murmurs, rubs or gallops  Abd: surgical bandage intact, JO draining serosanguinous fluid, hypoactive bowel sounds, diffusely tender to palpation  Ext: + open ulcer on medial left great toe, currently bandaged, +RUE swelling localized to hand and forearm    MEDICATIONS  piperacillin/tazobactam IVPB. IV Intermittent      dextrose 40% Gel Oral PRN  dextrose 50% Injectable IV Push  dextrose 50% Injectable IV Push  dextrose 50% Injectable IV Push  glucagon  Injectable IntraMuscular PRN  insulin lispro (HumaLOG) corrective regimen sliding scale SubCutaneous      acetaminophen  Suppository .. Rectal PRN  HYDROmorphone  Injectable IV Push PRN  HYDROmorphone  Injectable IV Push PRN      enoxaparin Injectable SubCutaneous    pantoprazole  Injectable IV Push      dextrose 5%. IV Continuous  lactated ringers. IV Continuous      chlorhexidine 2% Cloths Topical    nicotine -  14 mG/24Hr(s) Patch Transdermal                          9.2    10.05 )-----------( 234      ( 14 May 2019 05:30 )             28.5       05-14    147<H>  |  111<H>  |  24<H>  ----------------------------<  132<H>  3.8   |  28  |  1.40<H>    Ca    8.2<L>      14 May 2019 05:30  Phos  2.6     -  Mg     2.0     -14    TPro  6.1  /  Alb  2.3<L>  /  TBili  0.3  /  DBili  .20  /  AST  25  /  ALT  18  /  AlkPhos  80  05-13      CARDIAC MARKERS ( 13 May 2019 04:53 )  x     / x     / 307 U/L / x     / x            Urinalysis Basic - ( 12 May 2019 21:26 )    Color: Yellow / Appearance: Clear / S.015 / pH: x  Gluc: x / Ketone: Small  / Bili: Negative / Urobili: Negative   Blood: x / Protein: 25 mg/dL / Nitrite: Negative   Leuk Esterase: Trace / RBC: 0-2 /HPF / WBC 6-10   Sq Epi: x / Non Sq Epi: Occasional / Bacteria: Few      .Urine Catheterized   No growth --  @ 09:26  .Sputum Sputum   Normal Respiratory Sophia present   Few polymorphonuclear leukocytes per low power field  Few Squamous epithelial cells per low power field  No organisms seen per oil power field  @ 01:10  .Blood Blood-Peripheral   No growth to date. --  @ 12:13    CENTRAL LINE: N  RODRIGUEZ: Y                        DATE INSERTED:                A-LINE: N                        GLOBAL ISSUE/BEST PRACTICE  Analgesia: PRN dialudid and tylenol  Sedation: N  HOB elevation: Y  Stress ulcer prophylaxis: IV protonix BID  VTE prophylaxis: lovenox 40  Glycemic control: moderate dose sliding scale  Nutrition: NPO    CODE STATUS: Full code

## 2019-05-14 NOTE — PROGRESS NOTE ADULT - SUBJECTIVE AND OBJECTIVE BOX
POST OPERATIVE DAY #: 3  STATUS POST:  Minh patch secondary to perforated ulcer      SUBJECTIVE:  47 y/o M seen and examined at bedside with no overnight events. Patient states he is feeling a little nauseous and complains of some diffuse, moderate abdominal pain. He is currently NPO with an NGT. He admits to flatus, but no bowel movement. Patient denies any fevers, chills, chest pain, shortness of breath, vomiting.    Vital Signs Last 24 Hrs  T(C): 37.3 (14 May 2019 08:00), Max: 37.5 (13 May 2019 20:30)  T(F): 99.1 (14 May 2019 08:00), Max: 99.5 (13 May 2019 20:30)  HR: 65 (14 May 2019 10:00) (61 - 79)  BP: 162/74 (14 May 2019 10:00) (136/67 - 162/74)  BP(mean): 107 (14 May 2019 10:00) (95 - 110)  RR: 20 (14 May 2019 10:00) (14 - 32)  SpO2: 93% (14 May 2019 10:00) (92% - 99%)    PHYSICAL EXAM:  GENERAL: NAD, well-developed  HEAD:  Atraumatic, Normocephalic  ABDOMEN: Dressing clean, dry and intact. JO drain in place. Abdomen soft, moderately tender to palpation in all 4 quadrants, mildly distended. Hypoactive bowel sounds x 4 quadrants.  NEUROLOGY: A&O x 3    I&O's Summary    13 May 2019 07:  -  14 May 2019 07:00  --------------------------------------------------------  IN: 2350 mL / OUT: 3630 mL / NET: -1280 mL      I&O's Detail    13 May 2019 07:  -  14 May 2019 07:00  --------------------------------------------------------  IN:    lactated ringers.: 2050 mL    Solution: 300 mL  Total IN: 2350 mL    OUT:    Bulb: 60 mL    Indwelling Catheter - Urethral: 2870 mL    Nasoenteral Tube: 700 mL  Total OUT: 3630 mL    Total NET: -1280 mL    MEDICATIONS  (STANDING):  chlorhexidine 2% Cloths 1 Application(s) Topical daily  dextrose 5%. 1000 milliLiter(s) (50 mL/Hr) IV Continuous <Continuous>  dextrose 50% Injectable 12.5 Gram(s) IV Push once  dextrose 50% Injectable 25 Gram(s) IV Push once  dextrose 50% Injectable 25 Gram(s) IV Push once  enoxaparin Injectable 40 milliGRAM(s) SubCutaneous every 24 hours  insulin lispro (HumaLOG) corrective regimen sliding scale   SubCutaneous every 6 hours  lactated ringers. 1000 milliLiter(s) (75 mL/Hr) IV Continuous <Continuous>  nicotine -  14 mG/24Hr(s) Patch 1 patch Transdermal daily  pantoprazole  Injectable 40 milliGRAM(s) IV Push two times a day  piperacillin/tazobactam IVPB. 3.375 Gram(s) IV Intermittent every 8 hours    MEDICATIONS  (PRN):  acetaminophen  Suppository .. 650 milliGRAM(s) Rectal every 6 hours PRN Temp greater or equal to 38C (100.4F)  dextrose 40% Gel 15 Gram(s) Oral once PRN Blood Glucose LESS THAN 70 milliGRAM(s)/deciliter  glucagon  Injectable 1 milliGRAM(s) IntraMuscular once PRN Glucose LESS THAN 70 milligrams/deciliter  HYDROmorphone  Injectable 0.5 milliGRAM(s) IV Push every 4 hours PRN Moderate Pain (4 - 6)  HYDROmorphone  Injectable 1 milliGRAM(s) IV Push every 4 hours PRN Severe Pain (7 - 10)    LABS:                        9.2    10.05 )-----------( 234      ( 14 May 2019 05:30 )             28.5     05-14    147<H>  |  111<H>  |  24<H>  ----------------------------<  132<H>  3.8   |  28  |  1.40<H>    Ca    8.2<L>      14 May 2019 05:30  Phos  2.6     05-14  Mg     2.0     05-14    TPro  6.1  /  Alb  2.3<L>  /  TBili  0.3  /  DBili  .20  /  AST  25  /  ALT  18  /  AlkPhos  80  05-13      Urinalysis Basic - ( 12 May 2019 21:26 )    Color: Yellow / Appearance: Clear / S.015 / pH: x  Gluc: x / Ketone: Small  / Bili: Negative / Urobili: Negative   Blood: x / Protein: 25 mg/dL / Nitrite: Negative   Leuk Esterase: Trace / RBC: 0-2 /HPF / WBC 6-10   Sq Epi: x / Non Sq Epi: Occasional / Bacteria: Few    ASSESSMENT  47 y/o M POD3 s/p Minh patch secondary to perforated ulcer, NPO with NGT, +F, +nausea.    PLAN  - incentive spirometer  - pain control, supportive care  - OOB, ambulate as tolerated  - NPO  - monitor NGT outputs  - Aguilera catheter, monitor outputs  - serial abdominal exams  - labs in AM  -Case discussed with Dr. Mejia    Surgical Team Contact Information  Spectralink: Ext: [phone] or [phone]  Pager: [phone]

## 2019-05-14 NOTE — PROGRESS NOTE ADULT - ASSESSMENT
48M PMH HTN, DM2 (not on meds), and diabetic neuropathy who presents with perforated gastric antral ulcer with chemical peritonitis, underwent emergent omentopexy and plication for perforated antral ulcer, POD 3    - Neuro: Continue PRN dilaudid and tylenol.  - Cardio: Stable  - Resp: Stable, continue use of incentive spirometer and ambulation with assistance as tolerated  - GI: NPO, continue IV protonix BID. NGT in place.  - Renal: Aguilera in place with good urine output, plan for removal with TOV when patient becomes more ambulatory.  - ID: Prelim BC and sputum cultures negative, final urine cultures negative. Continue zosyn (day 4/5). F/u MRI left foot ordered as per podiatry.  - Heme: F/u venous doppler of R arm performed for right arm swelling.   - Endocrine: HbA1c of 8 - continue moderate dose insulin sliding scale.   - Prophylaxis: Lovenox 40  - Dispo: Stable for transfer to surgical floor

## 2019-05-14 NOTE — PROGRESS NOTE ADULT - ATTENDING COMMENTS
48M PMH HTN, DM2 (not on meds), and diabetic neuropathy who presents with perforated gastric antral ulcer with chemical peritonitis, underwent emergent omentopexy and plication for perforated antral ulcer, POD 3. He also has mild OBI that is improving.    --Neuro: pain control with prn Tylenol and Dilaudid. Nicotine patch for smoking cessation  --Cardiac: HD stable, continue LR@75/hr until tolerates po  --Pulm: atelectasis, continue pain control, incentive spirometry, out of bed to chair, ambulate with assistance. Supplemental oxygen with NC, goal SpO2>90%. BiPAP qhs prn  --GI: keep NGT to suction, NPO, diet per sx, JO with serosanguinous drainage, f/up surgery regarding removal. Continue PPI bid for perforated ulcer   --Renal: improving OBI, continue LR 75/hr. Urinary retention s/p jacques replacement 5/13. Will re-assess regarding removing jacques when more ambulatory  --ID: on Zosyn day 4/5, f/u BCx. UA with only 6-10 wbc and trace LE. Cultures all negative. F/up podiatry regarding left foot medial hallux diabetic foot ulcer with purulent drainage. X-rays negative. Continue local wound care. Patient declining MRI until abdominal surgical staples removed  --Endo: continue insulin coverage scale, A1C 8.0  - Heme: stable H/H, continue Lovenox for DVT ppx  --Dispo: full code, discussed with patient at bedside, stable for transfer to [facility] with continuous pulse ox

## 2019-05-14 NOTE — PROGRESS NOTE ADULT - SUBJECTIVE AND OBJECTIVE BOX
POD 3  T 99.1  VS wnl  Little pain  Abd: nondistended, mild tenderness, wound with no sign of infection  JO: serous  N ml/24 hrs  WBC 10  P: continue NG at least one more day, until output less

## 2019-05-15 LAB
ANION GAP SERPL CALC-SCNC: 10 MMOL/L — SIGNIFICANT CHANGE UP (ref 5–17)
ANION GAP SERPL CALC-SCNC: 6 MMOL/L — SIGNIFICANT CHANGE UP (ref 5–17)
BUN SERPL-MCNC: 20 MG/DL — SIGNIFICANT CHANGE UP (ref 7–23)
BUN SERPL-MCNC: 23 MG/DL — SIGNIFICANT CHANGE UP (ref 7–23)
CALCIUM SERPL-MCNC: 8.2 MG/DL — LOW (ref 8.5–10.1)
CALCIUM SERPL-MCNC: 8.5 MG/DL — SIGNIFICANT CHANGE UP (ref 8.5–10.1)
CHLORIDE SERPL-SCNC: 108 MMOL/L — SIGNIFICANT CHANGE UP (ref 96–108)
CHLORIDE SERPL-SCNC: 111 MMOL/L — HIGH (ref 96–108)
CK MB BLD-MCNC: 0.5 % — SIGNIFICANT CHANGE UP (ref 0–3.5)
CK MB BLD-MCNC: 4 % — HIGH (ref 0–3.5)
CK MB BLD-MCNC: 4.7 % — HIGH (ref 0–3.5)
CK MB CFR SERPL CALC: 1.4 NG/ML — SIGNIFICANT CHANGE UP (ref 0–3.6)
CK MB CFR SERPL CALC: 13.1 NG/ML — HIGH (ref 0–3.6)
CK MB CFR SERPL CALC: 8.1 NG/ML — HIGH (ref 0–3.6)
CK SERPL-CCNC: 205 U/L — SIGNIFICANT CHANGE UP (ref 26–308)
CK SERPL-CCNC: 269 U/L — SIGNIFICANT CHANGE UP (ref 26–308)
CK SERPL-CCNC: 276 U/L — SIGNIFICANT CHANGE UP (ref 26–308)
CO2 SERPL-SCNC: 27 MMOL/L — SIGNIFICANT CHANGE UP (ref 22–31)
CO2 SERPL-SCNC: 30 MMOL/L — SIGNIFICANT CHANGE UP (ref 22–31)
CREAT SERPL-MCNC: 1.3 MG/DL — SIGNIFICANT CHANGE UP (ref 0.5–1.3)
CREAT SERPL-MCNC: 1.4 MG/DL — HIGH (ref 0.5–1.3)
GLUCOSE SERPL-MCNC: 132 MG/DL — HIGH (ref 70–99)
GLUCOSE SERPL-MCNC: 138 MG/DL — HIGH (ref 70–99)
HCT VFR BLD CALC: 29.7 % — LOW (ref 39–50)
HCT VFR BLD CALC: 30.3 % — LOW (ref 39–50)
HGB BLD-MCNC: 9.7 G/DL — LOW (ref 13–17)
HGB BLD-MCNC: 9.9 G/DL — LOW (ref 13–17)
MAGNESIUM SERPL-MCNC: 1.8 MG/DL — SIGNIFICANT CHANGE UP (ref 1.6–2.6)
MAGNESIUM SERPL-MCNC: 2 MG/DL — SIGNIFICANT CHANGE UP (ref 1.6–2.6)
MAGNESIUM SERPL-MCNC: 2 MG/DL — SIGNIFICANT CHANGE UP (ref 1.6–2.6)
MCHC RBC-ENTMCNC: 31 PG — SIGNIFICANT CHANGE UP (ref 27–34)
MCHC RBC-ENTMCNC: 31.4 PG — SIGNIFICANT CHANGE UP (ref 27–34)
MCHC RBC-ENTMCNC: 32.7 GM/DL — SIGNIFICANT CHANGE UP (ref 32–36)
MCHC RBC-ENTMCNC: 32.7 GM/DL — SIGNIFICANT CHANGE UP (ref 32–36)
MCV RBC AUTO: 95 FL — SIGNIFICANT CHANGE UP (ref 80–100)
MCV RBC AUTO: 96.1 FL — SIGNIFICANT CHANGE UP (ref 80–100)
NRBC # BLD: 0 /100 WBCS — SIGNIFICANT CHANGE UP (ref 0–0)
NRBC # BLD: 0 /100 WBCS — SIGNIFICANT CHANGE UP (ref 0–0)
PHOSPHATE SERPL-MCNC: 2.3 MG/DL — LOW (ref 2.5–4.5)
PHOSPHATE SERPL-MCNC: 2.6 MG/DL — SIGNIFICANT CHANGE UP (ref 2.5–4.5)
PHOSPHATE SERPL-MCNC: 2.6 MG/DL — SIGNIFICANT CHANGE UP (ref 2.5–4.5)
PLATELET # BLD AUTO: 222 K/UL — SIGNIFICANT CHANGE UP (ref 150–400)
PLATELET # BLD AUTO: 223 K/UL — SIGNIFICANT CHANGE UP (ref 150–400)
POTASSIUM SERPL-MCNC: 3.4 MMOL/L — LOW (ref 3.5–5.3)
POTASSIUM SERPL-MCNC: 3.5 MMOL/L — SIGNIFICANT CHANGE UP (ref 3.5–5.3)
POTASSIUM SERPL-SCNC: 3.4 MMOL/L — LOW (ref 3.5–5.3)
POTASSIUM SERPL-SCNC: 3.5 MMOL/L — SIGNIFICANT CHANGE UP (ref 3.5–5.3)
RBC # BLD: 3.09 M/UL — LOW (ref 4.2–5.8)
RBC # BLD: 3.19 M/UL — LOW (ref 4.2–5.8)
RBC # FLD: 11.9 % — SIGNIFICANT CHANGE UP (ref 10.3–14.5)
RBC # FLD: 12 % — SIGNIFICANT CHANGE UP (ref 10.3–14.5)
SODIUM SERPL-SCNC: 144 MMOL/L — SIGNIFICANT CHANGE UP (ref 135–145)
SODIUM SERPL-SCNC: 148 MMOL/L — HIGH (ref 135–145)
TROPONIN I SERPL-MCNC: 0.06 NG/ML — HIGH (ref 0.01–0.04)
TROPONIN I SERPL-MCNC: 13.2 NG/ML — HIGH (ref 0.01–0.04)
TROPONIN I SERPL-MCNC: 8.93 NG/ML — HIGH (ref 0.01–0.04)
WBC # BLD: 8.55 K/UL — SIGNIFICANT CHANGE UP (ref 3.8–10.5)
WBC # BLD: 8.69 K/UL — SIGNIFICANT CHANGE UP (ref 3.8–10.5)
WBC # FLD AUTO: 8.55 K/UL — SIGNIFICANT CHANGE UP (ref 3.8–10.5)
WBC # FLD AUTO: 8.69 K/UL — SIGNIFICANT CHANGE UP (ref 3.8–10.5)

## 2019-05-15 PROCEDURE — 93971 EXTREMITY STUDY: CPT | Mod: 26,RT

## 2019-05-15 PROCEDURE — 93010 ELECTROCARDIOGRAM REPORT: CPT

## 2019-05-15 PROCEDURE — 99291 CRITICAL CARE FIRST HOUR: CPT

## 2019-05-15 PROCEDURE — 93010 ELECTROCARDIOGRAM REPORT: CPT | Mod: 77

## 2019-05-15 RX ORDER — DILTIAZEM HCL 120 MG
20 CAPSULE, EXT RELEASE 24 HR ORAL ONCE
Refills: 0 | Status: COMPLETED | OUTPATIENT
Start: 2019-05-15 | End: 2019-05-15

## 2019-05-15 RX ORDER — DIGOXIN 250 MCG
0.5 TABLET ORAL ONCE
Refills: 0 | Status: DISCONTINUED | OUTPATIENT
Start: 2019-05-15 | End: 2019-05-15

## 2019-05-15 RX ORDER — ENOXAPARIN SODIUM 100 MG/ML
80 INJECTION SUBCUTANEOUS ONCE
Refills: 0 | Status: COMPLETED | OUTPATIENT
Start: 2019-05-15 | End: 2019-05-15

## 2019-05-15 RX ORDER — DIGOXIN 250 MCG
0.5 TABLET ORAL ONCE
Refills: 0 | Status: COMPLETED | OUTPATIENT
Start: 2019-05-15 | End: 2019-05-15

## 2019-05-15 RX ORDER — SODIUM CHLORIDE 9 MG/ML
250 INJECTION, SOLUTION INTRAVENOUS ONCE
Refills: 0 | Status: COMPLETED | OUTPATIENT
Start: 2019-05-15 | End: 2019-05-15

## 2019-05-15 RX ORDER — HEPARIN SODIUM 5000 [USP'U]/ML
INJECTION INTRAVENOUS; SUBCUTANEOUS
Qty: 25000 | Refills: 0 | Status: DISCONTINUED | OUTPATIENT
Start: 2019-05-15 | End: 2019-05-15

## 2019-05-15 RX ORDER — ACETAMINOPHEN 500 MG
1000 TABLET ORAL ONCE
Refills: 0 | Status: COMPLETED | OUTPATIENT
Start: 2019-05-15 | End: 2019-05-15

## 2019-05-15 RX ORDER — DILTIAZEM HCL 120 MG
15 CAPSULE, EXT RELEASE 24 HR ORAL
Qty: 125 | Refills: 0 | Status: DISCONTINUED | OUTPATIENT
Start: 2019-05-15 | End: 2019-05-15

## 2019-05-15 RX ORDER — AMIODARONE HYDROCHLORIDE 400 MG/1
150 TABLET ORAL ONCE
Refills: 0 | Status: COMPLETED | OUTPATIENT
Start: 2019-05-15 | End: 2019-05-15

## 2019-05-15 RX ORDER — AMIODARONE HYDROCHLORIDE 400 MG/1
0.5 TABLET ORAL
Qty: 900 | Refills: 0 | Status: DISCONTINUED | OUTPATIENT
Start: 2019-05-15 | End: 2019-05-15

## 2019-05-15 RX ORDER — DIGOXIN 250 MCG
0.25 TABLET ORAL
Refills: 0 | Status: COMPLETED | OUTPATIENT
Start: 2019-05-16 | End: 2019-05-16

## 2019-05-15 RX ORDER — AMIODARONE HYDROCHLORIDE 400 MG/1
0.5 TABLET ORAL
Qty: 900 | Refills: 0 | Status: DISCONTINUED | OUTPATIENT
Start: 2019-05-15 | End: 2019-05-17

## 2019-05-15 RX ORDER — ENOXAPARIN SODIUM 100 MG/ML
80 INJECTION SUBCUTANEOUS
Refills: 0 | Status: DISCONTINUED | OUTPATIENT
Start: 2019-05-16 | End: 2019-05-26

## 2019-05-15 RX ORDER — METOPROLOL TARTRATE 50 MG
5 TABLET ORAL ONCE
Refills: 0 | Status: COMPLETED | OUTPATIENT
Start: 2019-05-15 | End: 2019-05-15

## 2019-05-15 RX ORDER — DIGOXIN 250 MCG
0.25 TABLET ORAL
Refills: 0 | Status: COMPLETED | OUTPATIENT
Start: 2019-05-15 | End: 2019-05-15

## 2019-05-15 RX ORDER — SODIUM CHLORIDE 9 MG/ML
500 INJECTION, SOLUTION INTRAVENOUS ONCE
Refills: 0 | Status: COMPLETED | OUTPATIENT
Start: 2019-05-15 | End: 2019-05-15

## 2019-05-15 RX ORDER — HEPARIN SODIUM 5000 [USP'U]/ML
5000 INJECTION INTRAVENOUS; SUBCUTANEOUS EVERY 6 HOURS
Refills: 0 | Status: DISCONTINUED | OUTPATIENT
Start: 2019-05-15 | End: 2019-05-15

## 2019-05-15 RX ORDER — POTASSIUM CHLORIDE 20 MEQ
10 PACKET (EA) ORAL
Refills: 0 | Status: COMPLETED | OUTPATIENT
Start: 2019-05-15 | End: 2019-05-15

## 2019-05-15 RX ORDER — DILTIAZEM HCL 120 MG
10 CAPSULE, EXT RELEASE 24 HR ORAL
Qty: 125 | Refills: 0 | Status: DISCONTINUED | OUTPATIENT
Start: 2019-05-15 | End: 2019-05-16

## 2019-05-15 RX ORDER — ENOXAPARIN SODIUM 100 MG/ML
80 INJECTION SUBCUTANEOUS
Refills: 0 | Status: DISCONTINUED | OUTPATIENT
Start: 2019-05-15 | End: 2019-05-15

## 2019-05-15 RX ORDER — HEPARIN SODIUM 5000 [USP'U]/ML
5000 INJECTION INTRAVENOUS; SUBCUTANEOUS ONCE
Refills: 0 | Status: DISCONTINUED | OUTPATIENT
Start: 2019-05-15 | End: 2019-05-15

## 2019-05-15 RX ORDER — AMIODARONE HYDROCHLORIDE 400 MG/1
1 TABLET ORAL
Qty: 900 | Refills: 0 | Status: DISCONTINUED | OUTPATIENT
Start: 2019-05-15 | End: 2019-05-15

## 2019-05-15 RX ORDER — AMIODARONE HYDROCHLORIDE 400 MG/1
1 TABLET ORAL
Qty: 900 | Refills: 0 | Status: COMPLETED | OUTPATIENT
Start: 2019-05-15 | End: 2019-05-15

## 2019-05-15 RX ADMIN — Medication 100 MILLIEQUIVALENT(S): at 10:21

## 2019-05-15 RX ADMIN — Medication 0.25 MILLIGRAM(S): at 18:16

## 2019-05-15 RX ADMIN — Medication 2: at 12:45

## 2019-05-15 RX ADMIN — SODIUM CHLORIDE 1000 MILLILITER(S): 9 INJECTION, SOLUTION INTRAVENOUS at 06:14

## 2019-05-15 RX ADMIN — SODIUM CHLORIDE 1000 MILLILITER(S): 9 INJECTION, SOLUTION INTRAVENOUS at 12:06

## 2019-05-15 RX ADMIN — Medication 20 MILLIGRAM(S): at 06:05

## 2019-05-15 RX ADMIN — PIPERACILLIN AND TAZOBACTAM 25 GRAM(S): 4; .5 INJECTION, POWDER, LYOPHILIZED, FOR SOLUTION INTRAVENOUS at 13:01

## 2019-05-15 RX ADMIN — Medication 10 MG/HR: at 16:26

## 2019-05-15 RX ADMIN — HYDROMORPHONE HYDROCHLORIDE 1 MILLIGRAM(S): 2 INJECTION INTRAMUSCULAR; INTRAVENOUS; SUBCUTANEOUS at 18:19

## 2019-05-15 RX ADMIN — Medication 100 MILLIEQUIVALENT(S): at 09:10

## 2019-05-15 RX ADMIN — Medication 1 PATCH: at 07:35

## 2019-05-15 RX ADMIN — AMIODARONE HYDROCHLORIDE 16.67 MG/MIN: 400 TABLET ORAL at 15:01

## 2019-05-15 RX ADMIN — HYDROMORPHONE HYDROCHLORIDE 1 MILLIGRAM(S): 2 INJECTION INTRAMUSCULAR; INTRAVENOUS; SUBCUTANEOUS at 18:49

## 2019-05-15 RX ADMIN — Medication 1000 MILLIGRAM(S): at 10:51

## 2019-05-15 RX ADMIN — Medication 100 MILLIEQUIVALENT(S): at 15:18

## 2019-05-15 RX ADMIN — Medication 5 MILLIGRAM(S): at 06:12

## 2019-05-15 RX ADMIN — AMIODARONE HYDROCHLORIDE 33.33 MG/MIN: 400 TABLET ORAL at 08:34

## 2019-05-15 RX ADMIN — Medication 15 MG/HR: at 07:39

## 2019-05-15 RX ADMIN — HYDROMORPHONE HYDROCHLORIDE 1 MILLIGRAM(S): 2 INJECTION INTRAMUSCULAR; INTRAVENOUS; SUBCUTANEOUS at 21:59

## 2019-05-15 RX ADMIN — CHLORHEXIDINE GLUCONATE 1 APPLICATION(S): 213 SOLUTION TOPICAL at 11:46

## 2019-05-15 RX ADMIN — Medication 100 MILLIEQUIVALENT(S): at 18:20

## 2019-05-15 RX ADMIN — Medication 100 MILLIEQUIVALENT(S): at 07:41

## 2019-05-15 RX ADMIN — Medication 20 MILLIGRAM(S): at 07:39

## 2019-05-15 RX ADMIN — PIPERACILLIN AND TAZOBACTAM 25 GRAM(S): 4; .5 INJECTION, POWDER, LYOPHILIZED, FOR SOLUTION INTRAVENOUS at 21:59

## 2019-05-15 RX ADMIN — Medication 2: at 18:16

## 2019-05-15 RX ADMIN — HYDROMORPHONE HYDROCHLORIDE 1 MILLIGRAM(S): 2 INJECTION INTRAMUSCULAR; INTRAVENOUS; SUBCUTANEOUS at 23:00

## 2019-05-15 RX ADMIN — AMIODARONE HYDROCHLORIDE 618 MILLIGRAM(S): 400 TABLET ORAL at 06:23

## 2019-05-15 RX ADMIN — Medication 10 MG/HR: at 10:21

## 2019-05-15 RX ADMIN — SODIUM CHLORIDE 75 MILLILITER(S): 9 INJECTION, SOLUTION INTRAVENOUS at 05:01

## 2019-05-15 RX ADMIN — Medication 1 PATCH: at 11:48

## 2019-05-15 RX ADMIN — SODIUM CHLORIDE 500 MILLILITER(S): 9 INJECTION, SOLUTION INTRAVENOUS at 06:14

## 2019-05-15 RX ADMIN — Medication 20 MILLIGRAM(S): at 10:22

## 2019-05-15 RX ADMIN — Medication 400 MILLIGRAM(S): at 10:21

## 2019-05-15 RX ADMIN — Medication 2: at 00:55

## 2019-05-15 RX ADMIN — PANTOPRAZOLE SODIUM 40 MILLIGRAM(S): 20 TABLET, DELAYED RELEASE ORAL at 18:16

## 2019-05-15 RX ADMIN — PIPERACILLIN AND TAZOBACTAM 25 GRAM(S): 4; .5 INJECTION, POWDER, LYOPHILIZED, FOR SOLUTION INTRAVENOUS at 06:24

## 2019-05-15 RX ADMIN — Medication 1 PATCH: at 20:30

## 2019-05-15 RX ADMIN — Medication 1 PATCH: at 11:45

## 2019-05-15 RX ADMIN — ENOXAPARIN SODIUM 80 MILLIGRAM(S): 100 INJECTION SUBCUTANEOUS at 15:16

## 2019-05-15 RX ADMIN — PANTOPRAZOLE SODIUM 40 MILLIGRAM(S): 20 TABLET, DELAYED RELEASE ORAL at 06:24

## 2019-05-15 RX ADMIN — Medication 208 MILLIGRAM(S): at 12:49

## 2019-05-15 RX ADMIN — Medication 100 MILLIEQUIVALENT(S): at 16:26

## 2019-05-15 RX ADMIN — HYDROMORPHONE HYDROCHLORIDE 1 MILLIGRAM(S): 2 INJECTION INTRAMUSCULAR; INTRAVENOUS; SUBCUTANEOUS at 04:32

## 2019-05-15 NOTE — PROGRESS NOTE ADULT - SUBJECTIVE AND OBJECTIVE BOX
POST OPERATIVE DAY #: 4  STATUS POST:  Minh patch secondary to perforated ulcer      SUBJECTIVE:  47 y/o M seen and examined at bedside. Patient had episode of Afib this morning, which is still persisting.  He has never experienced Afib before. Patient with no other new complaints at this time. He is currently NPO with an NGT. He states he is in moderate pain, admits to passing flatus, but no bowel movement. Patient denies any fevers, chills, chest pain, shortness of breath, abdominal pain, nausea, vomiting or diarrhea.    Vital Signs Last 24 Hrs  T(C): 37.2 (15 May 2019 07:33), Max: 37.7 (14 May 2019 12:00)  T(F): 98.9 (15 May 2019 07:33), Max: 99.8 (14 May 2019 12:00)  HR: 153 (15 May 2019 09:00) (53 - 166)  BP: 134/76 (15 May 2019 09:00) (118/75 - 171/91)  BP(mean): 97 (15 May 2019 09:00) (90 - 111)  RR: 21 (15 May 2019 09:00) (17 - 31)  SpO2: 96% (15 May 2019 09:00) (90% - 100%)    PHYSICAL EXAM:  GENERAL: NAD, well-developed  HEAD:  Atraumatic, Normocephalic  ABDOMEN: Dressing clean dry and intact. JO drain in place with serosanguinous fluid. Abdomen soft, diffusely tender to palpation, mildly distended. Hypoactive bowel sounds x 4 quadrants.  NEUROLOGY: A&O x 3.    I&O's Summary    14 May 2019 07:01  -  15 May 2019 07:00  --------------------------------------------------------  IN: 925 mL / OUT: 3035 mL / NET: -2110 mL    15 May 2019 07:01  -  15 May 2019 09:40  --------------------------------------------------------  IN: 398 mL / OUT: 135 mL / NET: 263 mL      I&O's Detail    14 May 2019 07:01  -  15 May 2019 07:00  --------------------------------------------------------  IN:    lactated ringers.: 750 mL    Solution: 175 mL  Total IN: 925 mL    OUT:    Bulb: 90 mL    Indwelling Catheter - Urethral: 2395 mL    Nasoenteral Tube: 550 mL  Total OUT: 3035 mL    Total NET: -2110 mL      15 May 2019 07:01  -  15 May 2019 09:40  --------------------------------------------------------  IN:    amiodarone Infusion: 33 mL    diltiazem Infusion: 15 mL    lactated ringers.: 150 mL    Solution: 200 mL  Total IN: 398 mL    OUT:    Indwelling Catheter - Urethral: 135 mL  Total OUT: 135 mL    Total NET: 263 mL        MEDICATIONS  (STANDING):  amiodarone Infusion 1 mG/Min (33.333 mL/Hr) IV Continuous <Continuous>  amiodarone Infusion 0.5 mG/Min (16.667 mL/Hr) IV Continuous <Continuous>  chlorhexidine 2% Cloths 1 Application(s) Topical daily  dextrose 5%. 1000 milliLiter(s) (50 mL/Hr) IV Continuous <Continuous>  dextrose 50% Injectable 12.5 Gram(s) IV Push once  dextrose 50% Injectable 25 Gram(s) IV Push once  dextrose 50% Injectable 25 Gram(s) IV Push once  diltiazem Infusion 15 mG/Hr (15 mL/Hr) IV Continuous <Continuous>  enoxaparin Injectable 40 milliGRAM(s) SubCutaneous every 24 hours  insulin lispro (HumaLOG) corrective regimen sliding scale   SubCutaneous every 6 hours  lactated ringers. 1000 milliLiter(s) (75 mL/Hr) IV Continuous <Continuous>  nicotine -  14 mG/24Hr(s) Patch 1 patch Transdermal daily  pantoprazole  Injectable 40 milliGRAM(s) IV Push two times a day  piperacillin/tazobactam IVPB. 3.375 Gram(s) IV Intermittent every 8 hours  potassium chloride  10 mEq/100 mL IVPB 10 milliEquivalent(s) IV Intermittent every 1 hour    MEDICATIONS  (PRN):  acetaminophen  Suppository .. 650 milliGRAM(s) Rectal every 6 hours PRN Temp greater or equal to 38C (100.4F)  dextrose 40% Gel 15 Gram(s) Oral once PRN Blood Glucose LESS THAN 70 milliGRAM(s)/deciliter  glucagon  Injectable 1 milliGRAM(s) IntraMuscular once PRN Glucose LESS THAN 70 milligrams/deciliter  HYDROmorphone  Injectable 0.5 milliGRAM(s) IV Push every 4 hours PRN Moderate Pain (4 - 6)  HYDROmorphone  Injectable 1 milliGRAM(s) IV Push every 4 hours PRN Severe Pain (7 - 10)    LABS:                        9.7    8.69  )-----------( 222      ( 15 May 2019 05:36 )             29.7     05-15    148<H>  |  111<H>  |  23  ----------------------------<  132<H>  3.4<L>   |  27  |  1.40<H>    Ca    8.5      15 May 2019 05:36  Phos  2.6     05-15  Mg     2.0     05-15    ASSESSMENT  47 y/o M POD4 s/p Minh patch secondary to perforated ulcer, currently NPO with NGT, +F, now with new onset Afib.    PLAN  - incentive spirometer  - pain control  - Care as per ICU team  - Cardio recs appreciated   - OOB, ambulate as tolerated  - NPO, IVF  - monitor NGT outputs  - monitor Aguilera catheter outputs  - serial abdominal exams  - labs in AM  - Case discussed with Dr. Mejia    Surgical Team Contact Information  Spectralink: Ext: [phone] or [phone]  Pager: [phone]

## 2019-05-15 NOTE — PROGRESS NOTE ADULT - SUBJECTIVE AND OBJECTIVE BOX
POD 4  Afeb   rapid a fib  Bp wnl  Passed flatus.  No BM  No significant pain  Abd: nondistended, very mild tenderness  NG output 150 last 5 hours  P: clamp NG;  measure residuals q 4 Hr

## 2019-05-15 NOTE — CHART NOTE - NSCHARTNOTEFT_GEN_A_CORE
Resident Rapid Response Note    Rapid response was called at 0515 for new onset rapid a fib.    Events leading up to Rapid Response: 48M PMH HTN, DM2 (not on meds), and diabetic neuropathy who presents with perforated gastric antral ulcer with chemical peritonitis, underwent emergent omentopexy and plication for perforated antral ulcer, POD 4. RRT called for new onset rapid a fib.      Patient was seen and examined at the bedside by the rapid response team and resident medicine team. ICU PA at bedside.    Rapid Response Vital Signs:  BP: 100/80  HR: 178  RR: 16  SpO2:98 % on NC  Temp: 98.3  FS: 126    Vital Signs Last 24 Hrs  T(C): 36.8 (15 May 2019 05:09), Max: 37.7 (14 May 2019 12:00)  T(F): 98.2 (15 May 2019 05:09), Max: 99.8 (14 May 2019 12:00)  HR: 140 (15 May 2019 05:09) (53 - 140)  BP: 169/68 (15 May 2019 05:09) (143/65 - 171/91)  BP(mean): 104 (14 May 2019 16:00) (94 - 111)  RR: 17 (15 May 2019 05:09) (16 - 24)  SpO2: 94% (15 May 2019 05:09) (90% - 100%)    Physical Exam:  General: Well developed, well nourished, in no acute distress  HEENT: NCAT, PERRLA, EOMI bl, moist mucous membranes   Neurology: A&Ox3, nonfocal, CN II-XII grossly intact, sensation intact, no gait abnormalities   Respiratory: CTA B/L, No wheezing, rales, or rhonchi  CV: irregular, S1/S2 present, no murmurs, rubs, or gallops  Abdominal: Soft, nontender, non-distended, normoactive bowel sounds  Extremities: peripheral pulses present. R arm swollen   Skin: warm, dry, normal color      Assessment/Plan: 48M PMH HTN, DM2 (not on meds), and diabetic neuropathy who presents with perforated gastric antral ulcer with chemical peritonitis, underwent emergent omentopexy and plication for perforated antral ulcer, POD 4.  -patient given 5mg IV lopressor x2 total during rapid with no response  -250cc LR bolus x2  -transfer to ICU for possible amiodarone   -Dr. Mejia aware   -further management per ICU Resident Rapid Response Note    Rapid response was called at 0515 for new onset rapid a fib.    Events leading up to Rapid Response: 48M PMH HTN, DM2 (not on meds), and diabetic neuropathy who presents with perforated gastric antral ulcer with chemical peritonitis, underwent emergent omentopexy and plication for perforated antral ulcer, POD 4. RRT called for new onset rapid a fib. Patient asymptomatic during rapid response, states that he is feeling fine, denies any chest pain, palpitations, sob, rodas, nausea, vomiting. Patient stated that he does not follow up regularly outpatient and does not take any medications prior to coming to the hospital. Denied any history of irregular heart rates.     Patient was seen and examined at the bedside by the rapid response team and resident medicine team. ICU PA at bedside.    Rapid Response Vital Signs:  BP: 100/80  HR: 178  RR: 16  SpO2:98 % on NC  Temp: 98.3  FS: 126    Vital Signs Last 24 Hrs  T(C): 36.8 (15 May 2019 05:09), Max: 37.7 (14 May 2019 12:00)  T(F): 98.2 (15 May 2019 05:09), Max: 99.8 (14 May 2019 12:00)  HR: 140 (15 May 2019 05:09) (53 - 140)  BP: 169/68 (15 May 2019 05:09) (143/65 - 171/91)  BP(mean): 104 (14 May 2019 16:00) (94 - 111)  RR: 17 (15 May 2019 05:09) (16 - 24)  SpO2: 94% (15 May 2019 05:09) (90% - 100%)    Physical Exam:  General: Well developed, well nourished, in no acute distress  HEENT: NCAT, PERRLA, EOMI bl, moist mucous membranes   Neurology: A&Ox3, nonfocal  Respiratory: CTA B/L, No wheezing, rales, or rhonchi  CV: irregular, S1/S2 present, no murmurs, rubs, or gallops  Abdominal: Soft, nontender, non-distended, normoactive bowel sounds  Extremities: peripheral pulses present. R arm swollen, nontender, non-erythematous  Skin: warm, dry, normal color      Assessment/Plan: 48M PMH HTN, DM2 (not on meds), and diabetic neuropathy who presents with perforated gastric antral ulcer with chemical peritonitis, underwent emergent omentopexy and plication for perforated antral ulcer, POD 4.  -EKG showed afib  -patient given 5mg IV lopressor x2 total during rapid with no change in heart rate   -250cc LR bolus x2  -transfer to ICU for possible amiodarone   -Dr. Mejia called and aware, rec management per ICU   -further management per ICU

## 2019-05-15 NOTE — PROGRESS NOTE ADULT - ATTENDING COMMENTS
48M PMH HTN, DM2 (not on meds), and diabetic neuropathy who presents with perforated gastric antral ulcer with chemical peritonitis, underwent emergent omentopexy and plication for perforated antral ulcer, POD 4. He also has mild OBI that is improving. Course complicated by new-onset A-Fib with RVR and NSTEMI.    --Neuro: pain control with acetaminophen and hydromorphone prn  --Cardiac: improved rate control with amiodarone load (IV) and diltiazem gtt. Continue digoxin load. Rising Tn, no ST/T wave changes on ECG. Start enoxaparin for NSTEMI. Remains HD stable, continue LR@75/hr until tolerates po  --Pulm: atelectasis, continue pain control, incentive spirometry, out of bed to chair, ambulate with assistance. Supplemental oxygen with NC, goal SpO2>90%. BiPAP qhs prn. Continue nicotine patch for smoking cessation  --GI: NG tube clamped by surgery. Keep NPO for now. JO with serosanguinous drainage. Continue PPI bid for perforated ulcer   --Renal: improving OBI, continue LR 75/hr. Urinary retention s/p jacques replacement 5/13. Will re-assess regarding removing jacques when more ambulatory  --ID: on Zosyn day 5/5. Blood and urine cultures negative. Sputum culture with normal respiratory jakub. F/up podiatry regarding left foot medial hallux diabetic foot ulcer with purulent drainage. X-rays negative. Continue local wound care. Patient declining MRI until abdominal surgical staples removed  --Endo: continue insulin coverage scale, A1C 8.0  - Heme: stable H/H, DVT ppx (on lovenox)  --Dispo: full code, discussed with patient at bedside  CC time spent: 35 min

## 2019-05-15 NOTE — PROGRESS NOTE ADULT - ASSESSMENT
48M PMH HTN, DM2 (not on meds), and diabetic neuropathy who presents with perforated gastric antral ulcer with chemical peritonitis, underwent emergent omentopexy and plication for perforated antral ulcer, POD 4, downgraded to medical floor, now requiring transfer to ICU for new onset afib with RVR. 48M PMH HTN, DM2 (not on meds), and diabetic neuropathy who presents with perforated gastric antral ulcer with chemical peritonitis, underwent emergent omentopexy and plication for perforated antral ulcer, POD 4, downgraded to medical floor, now requiring transfer to ICU for new onset afib with RVR.    - Neuro: Patient requiring continuous PRN dilaudid, will give one time dose of IV tylenol in attempt to control pain and avoid excess dilaudid use as at increased risk for ileus.  - Cardio: Sustained atrial fibrillation. s/p 10 IV lopressor, 20mg IV cardizem, 150 mg IV amiodarone x 1 and started on amiodarone drip. Patient remains in atrial fibrillation, will give additional 20 IV cardizem and start cardizem drip as well. First CE mildly elevated at .056, will repeat second set, if elevated will trend x 3. F/u repeat ECHO - results from ECHO performed 5/12 show dilated left atrium with moderate MR. F/u TSH in AM.  - Resp: Unlabored breathing, continue BIPAP qHS PRN and incentive spirometry.  - GI: NGT in place, receiving tube feeds. Dressing around surgical site c/d/i.  - Renal: Hypokalemia repleted with IV K, plan to keep K>4, Mg>2  - ID: Day 5/5 of zosyn.  - Heme: On lovenox for DVT prophylaxis, if patient remains in atrial fibrillation will consider full dose anticoagulation. F/u RUE doppler.   - Endocrine: Continue moderate dose insulin sliding scale.   - Prophylaxis: Lovenox for DVT ppx. 48M PMH HTN, DM2 (not on meds), and diabetic neuropathy who presents with perforated gastric antral ulcer with chemical peritonitis, underwent emergent omentopexy and plication for perforated antral ulcer, POD 4, downgraded to medical floor, now requiring transfer to ICU for new onset afib with RVR.    - Neuro: Patient requiring continuous PRN dilaudid, will give one time dose of IV tylenol in attempt to control pain and avoid excess dilaudid use as at increased risk for ileus.  - Cardio: Sustained atrial fibrillation. s/p 10 IV lopressor, 20mg IV cardizem, 150 mg IV amiodarone x 1 and started on amiodarone drip. Patient remains in atrial fibrillation, will give additional 20 IV cardizem and start cardizem drip as well. First CE mildly elevated at .056, will repeat second set, if elevated will trend x 3. F/u repeat ECHO - results from ECHO performed 5/12 show dilated left atrium with moderate MR. F/u TSH in AM.  - Resp: Unlabored breathing, continue BIPAP qHS PRN and incentive spirometry.  - GI: NGT in place, receiving tube feeds. Dressing around surgical site c/d/i.  - Renal: Hypokalemia repleted with IV K, plan to keep K>4, Mg>2. Aguilera catheter in place, plan to remove when patient is more ambulatory.  - ID: Day 5/5 of zosyn.  - Heme: On lovenox for DVT prophylaxis, if patient remains in atrial fibrillation will consider full dose anticoagulation. F/u RUE doppler.   - Endocrine: Continue moderate dose insulin sliding scale.   - Prophylaxis: Lovenox for DVT ppx. 48M PMH HTN, DM2 (not on meds), and diabetic neuropathy who presents with perforated gastric antral ulcer with chemical peritonitis, underwent emergent omentopexy and plication for perforated antral ulcer, POD 4, downgraded to medical floor, now requiring transfer to ICU for new onset afib with RVR.    - Neuro: Patient requiring continuous PRN dilaudid, will give one time dose of IV tylenol in attempt to control pain and avoid excess dilaudid use as at increased risk for ileus.  - Cardio: Sustained atrial fibrillation. s/p 10 IV lopressor, 20mg IV cardizem, 150 mg IV amiodarone x 1 and started on amiodarone drip. Patient remains in atrial fibrillation, will give additional 20 IV cardizem and start cardizem drip as well. First CE mildly elevated at .056, will repeat second set, if elevated will trend x 3. F/u repeat ECHO - results from ECHO performed 5/12 show dilated left atrium with moderate MR. F/u TSH in AM.  - Resp: Unlabored breathing, continue BIPAP qHS PRN and incentive spirometry.  - GI: NGT in place, receiving tube feeds. Dressing around surgical site c/d/i.  - Renal: Hypokalemia repleted with IV K, plan to keep K>4, Mg>2. Aguilera catheter in place, plan to remove when patient is more ambulatory.  - ID: Day 5/5 of zosyn. Patient would like to refrain from MRI of left foot until abdominal surgical staples removed. Continue local wound care to foot ulcer.  - Heme: On lovenox for DVT prophylaxis, if patient remains in atrial fibrillation will consider full dose anticoagulation. F/u RUE doppler.   - Endocrine: Continue moderate dose insulin sliding scale.   - Prophylaxis: Lovenox for DVT ppx.

## 2019-05-15 NOTE — PHARMACOTHERAPY INTERVENTION NOTE - COMMENTS
Order entered for amiodarone drip to start at 0.5mg/min x6h and then 1mg/min x18 hours after that. Order should be 1mg/min x6h followed by 0.5mg/min x18 hours after that. Spoke with NP to correct order.

## 2019-05-15 NOTE — CONSULT NOTE ADULT - ASSESSMENT
Mr. Miles is a 48 year old male with HTN and DM2, who presented with Perforated Antral Gastric Ulcer, POD 4 s/p Emergent Exp-lap Oomentopexy and plication. Last night, he had an RRT for atrial fibrillation with rapid ventricular response, which is new.    - Currently hemodynamically stable, though remains with fast rates in AF  - Agree with Amio 150 IV x 1, then start Amiodarone gtt.  - Can also give IV Cardizem, +/- Cardizem gtt as tolerated by blood pressures  - Check echocardiogram  - Once he can tolerate po, we can change these medications over  - No anticoagulation in the setting of recent surgery, until ok by surgery. Hopefully, this AF is short lived in the setting of stress.  - Watch creatinine and electrolytes. Keep K>4, Mg>2  - Will follow with you. Mr. Miles is a 48 year old male with HTN and DM2, who presented with Perforated Antral Gastric Ulcer, POD 4 s/p Emergent Exp-lap Oomentopexy and plication. Last night, he had an RRT for atrial fibrillation with rapid ventricular response, which is new.    - Currently hemodynamically stable, though remains with fast rates in AF  - Agree with Amio 150 IV x 1, then start Amiodarone gtt.  - Can also give IV Cardizem, +/- Cardizem gtt as tolerated by blood pressures  - Check echocardiogram  - Once he can tolerate po, we can change these medications over  - No anticoagulation in the setting of recent surgery, until ok by surgery. Hopefully, this AF is short lived in the setting of stress.  - Watch creatinine and electrolytes. Keep K>4, Mg>2  - Will follow with you.    Patient at high risk for decompensation given the above comorbidities and active medical issues.  I have personally spent >35 minutes  of critical care time in examining patient, reviewing chart, discussing care/management with patient/family, and ICU team. Mr. Miles is a 48 year old male with HTN and DM2, who presented with Perforated Antral Gastric Ulcer, POD 4 s/p Emergent Exp-lap Oomentopexy and plication. Last night, he had an RRT for atrial fibrillation with rapid ventricular response, which is new.    - Currently hemodynamically stable, though remains with fast rates in AF  - Agree with Amio 150 IV x 1, then start Amiodarone gtt.  - Can also give IV Cardizem, +/- Cardizem gtt as tolerated by blood pressures  - echo with normal lv function, moderate mr  - Once he can tolerate po, we can change these medications over  - No anticoagulation in the setting of recent surgery, until ok by surgery. Hopefully, this AF is short lived in the setting of stress.  - Watch creatinine and electrolytes. Keep K>4, Mg>2  - Will follow with you.    Patient at high risk for decompensation given the above comorbidities and active medical issues.  I have personally spent >35 minutes  of critical care time in examining patient, reviewing chart, discussing care/management with patient/family, and ICU team.

## 2019-05-15 NOTE — PROGRESS NOTE ADULT - SUBJECTIVE AND OBJECTIVE BOX
Patient is a 48y old  Male who presents with a chief complaint of perforated stomach ulcer (15 May 2019 07:35)    Interval History: Patient downgraded to medical floor yesterday however transferred back to ICU overnight for new onset atrial fibrillation with RVR. Patient seen and examined at bedside. Denies dizziness, chest pain/palpitations, dyspnea, nausea/vomiting. Endorses flatus and abdominal pain.     REVIEW OF SYSTEMS  Constitutional: No fever, chills, fatigue  Neuro: No headache, numbness, weakness  Resp: No cough, wheezing, shortness of breath  CVS: No chest pain, palpitations, leg swelling  GI: + abdominal pain, no nausea, vomiting, diarrhea   : No dysuria, frequency, incontinence  Skin: No itching, burning, rashes, or lesions   Msk: No joint pain or swelling  Psych: No depression, anxiety, mood swings  Heme: No bleeding    T(F): 98.9 (05-15-19 @ 07:33), Max: 99.8 (05-14-19 @ 12:00)  HR: 154 (05-15-19 @ 08:00) (53 - 166)  BP: 133/74 (05-15-19 @ 08:00) (118/75 - 171/91)  RR: 20 (05-15-19 @ 08:00) (17 - 31)  SpO2: 95% (05-15-19 @ 08:00) (90% - 100%)  Wt(kg): --      CAPILLARY BLOOD GLUCOSE      POCT Blood Glucose.: 128 mg/dL (15 May 2019 06:07)  POCT Blood Glucose.: 126 mg/dL (15 May 2019 05:19)  POCT Blood Glucose.: 153 mg/dL (14 May 2019 23:59)  POCT Blood Glucose.: 151 mg/dL (14 May 2019 18:27)  POCT Blood Glucose.: 146 mg/dL (14 May 2019 12:09)    I&O's Summary    05-14 @ 07:01  -  05-15 @ 07:00  --------------------------------------------------------  IN: 925 mL / OUT: 3035 mL / NET: -2110 mL      PHYSICAL EXAM  General: Lying uncomfortably in bed  CNS: A&Ox3, no focal deficits  HEENT: +NGT draining yellow-brown fluid  Resp: CTAB, no wheezes, rales or rhonchi  CVS: irregular, tachycardic, +S1, S2 no murmurs, rubs or gallops  Abd: surgical bandage intact, JO draining serosanguinous fluid, hypoactive bowel sounds, diffusely tender to palpation  Ext: + open ulcer on medial left great toe, currently bandaged, +RUE swelling localized to hand and forearm    MEDICATIONS  piperacillin/tazobactam IVPB. IV Intermittent    amiodarone Infusion IV Continuous  amiodarone Infusion IV Continuous  diltiazem Infusion IV Continuous    dextrose 40% Gel Oral PRN  dextrose 50% Injectable IV Push  dextrose 50% Injectable IV Push  dextrose 50% Injectable IV Push  glucagon  Injectable IntraMuscular PRN  insulin lispro (HumaLOG) corrective regimen sliding scale SubCutaneous      acetaminophen  Suppository .. Rectal PRN  HYDROmorphone  Injectable IV Push PRN  HYDROmorphone  Injectable IV Push PRN      enoxaparin Injectable SubCutaneous    pantoprazole  Injectable IV Push      dextrose 5%. IV Continuous  lactated ringers. IV Continuous  potassium chloride  10 mEq/100 mL IVPB IV Intermittent      chlorhexidine 2% Cloths Topical    nicotine -  14 mG/24Hr(s) Patch Transdermal                          9.7    8.69  )-----------( 222      ( 15 May 2019 05:36 )             29.7       05-15    148<H>  |  111<H>  |  23  ----------------------------<  132<H>  3.4<L>   |  27  |  1.40<H>    Ca    8.5      15 May 2019 05:36  Phos  2.6     05-15  Mg     2.0     05-15        CARDIAC MARKERS ( 15 May 2019 05:36 )  .056 ng/mL / x     / 269 U/L / x     / 1.4 ng/mL    .Urine Catheterized   No growth -- 05-13 @ 09:26  .Sputum Sputum   Normal Respiratory Sophia present   Few polymorphonuclear leukocytes per low power field  Few Squamous epithelial cells per low power field  No organisms seen per oil power field 05-13 @ 01:10  .Blood Blood-Peripheral   No growth to date. -- 05-11 @ 12:13    CENTRAL LINE: N  RODRIGUEZ: Y                        DATE INSERTED: 5/12               A-LINE: N      Analgesia: PRN dialudid and tylenol  Sedation: N  HOB elevation: Y  Stress ulcer prophylaxis: IV protonix BID  VTE prophylaxis: lovenox 40  Glycemic control: moderate dose sliding scale  Nutrition: NPO    CODE STATUS: Full code  GO discussion: N Patient is a 48y old  Male who presents with a chief complaint of perforated stomach ulcer (15 May 2019 07:35)    Interval History: Patient downgraded to medical floor yesterday however transferred back to ICU overnight for new onset atrial fibrillation with RVR. Patient seen and examined at bedside. Denies dizziness, chest pain/palpitations, dyspnea, nausea/vomiting. Endorses flatus and abdominal pain, has not had a bowel movement.     REVIEW OF SYSTEMS  Constitutional: No fever, chills, fatigue  Neuro: No headache, numbness, weakness  Resp: No cough, wheezing, shortness of breath  CVS: No chest pain, palpitations, leg swelling  GI: + abdominal pain, no nausea, vomiting, diarrhea   : No dysuria, frequency, incontinence  Skin: No itching, burning, rashes, or lesions   Msk: No joint pain or swelling  Psych: No depression, anxiety, mood swings  Heme: No bleeding    T(F): 98.9 (05-15-19 @ 07:33), Max: 99.8 (05-14-19 @ 12:00)  HR: 154 (05-15-19 @ 08:00) (53 - 166)  BP: 133/74 (05-15-19 @ 08:00) (118/75 - 171/91)  RR: 20 (05-15-19 @ 08:00) (17 - 31)  SpO2: 95% (05-15-19 @ 08:00) (90% - 100%)  Wt(kg): --      CAPILLARY BLOOD GLUCOSE      POCT Blood Glucose.: 128 mg/dL (15 May 2019 06:07)  POCT Blood Glucose.: 126 mg/dL (15 May 2019 05:19)  POCT Blood Glucose.: 153 mg/dL (14 May 2019 23:59)  POCT Blood Glucose.: 151 mg/dL (14 May 2019 18:27)  POCT Blood Glucose.: 146 mg/dL (14 May 2019 12:09)    I&O's Summary    05-14 @ 07:01  -  05-15 @ 07:00  --------------------------------------------------------  IN: 925 mL / OUT: 3035 mL / NET: -2110 mL      PHYSICAL EXAM  General: Lying uncomfortably in bed  CNS: A&Ox3, no focal deficits  HEENT: +NGT draining yellow-brown fluid  Resp: CTAB, no wheezes, rales or rhonchi  CVS: irregular, tachycardic, +S1, S2 no murmurs, rubs or gallops  Abd: surgical bandage clean/dry/ intact, JO draining serosanguinous fluid, hypoactive bowel sounds, diffusely tender to palpation  Ext: + open ulcer on medial left great toe, currently bandaged, +RUE swelling localized to hand and forearm    MEDICATIONS  piperacillin/tazobactam IVPB. IV Intermittent    amiodarone Infusion IV Continuous  amiodarone Infusion IV Continuous  diltiazem Infusion IV Continuous    dextrose 40% Gel Oral PRN  dextrose 50% Injectable IV Push  dextrose 50% Injectable IV Push  dextrose 50% Injectable IV Push  glucagon  Injectable IntraMuscular PRN  insulin lispro (HumaLOG) corrective regimen sliding scale SubCutaneous      acetaminophen  Suppository .. Rectal PRN  HYDROmorphone  Injectable IV Push PRN  HYDROmorphone  Injectable IV Push PRN      enoxaparin Injectable SubCutaneous    pantoprazole  Injectable IV Push      dextrose 5%. IV Continuous  lactated ringers. IV Continuous  potassium chloride  10 mEq/100 mL IVPB IV Intermittent      chlorhexidine 2% Cloths Topical    nicotine -  14 mG/24Hr(s) Patch Transdermal                          9.7    8.69  )-----------( 222      ( 15 May 2019 05:36 )             29.7       05-15    148<H>  |  111<H>  |  23  ----------------------------<  132<H>  3.4<L>   |  27  |  1.40<H>    Ca    8.5      15 May 2019 05:36  Phos  2.6     05-15  Mg     2.0     05-15        CARDIAC MARKERS ( 15 May 2019 05:36 )  .056 ng/mL / x     / 269 U/L / x     / 1.4 ng/mL    .Urine Catheterized   No growth -- 05-13 @ 09:26  .Sputum Sputum   Normal Respiratory Sophia present   Few polymorphonuclear leukocytes per low power field  Few Squamous epithelial cells per low power field  No organisms seen per oil power field 05-13 @ 01:10  .Blood Blood-Peripheral   No growth to date. -- 05-11 @ 12:13    CENTRAL LINE: N  RODRIGUEZ: Y                        DATE INSERTED: 5/12               A-LINE: N      Analgesia: PRN dialudid and tylenol  Sedation: N  HOB elevation: Y  Stress ulcer prophylaxis: IV protonix BID  VTE prophylaxis: lovenox 40  Glycemic control: moderate dose sliding scale  Nutrition: NPO    CODE STATUS: Full code  GO discussion: N

## 2019-05-15 NOTE — CHART NOTE - NSCHARTNOTEFT_GEN_A_CORE
Pt seen and examined at bedside for rapid followup. Currently denies chest pain, dyspnea, or palpitations. Endorses mild abdominal pain.     ROS:  CONSTITUTIONAL: No weakness, fevers or chills  EYES/ENT: No visual changes;  No vertigo or throat pain   NECK: No pain or stiffness  RESPIRATORY: No cough, wheezing, hemoptysis; No shortness of breath  CARDIOVASCULAR: No chest pain or palpitations  GASTROINTESTINAL: + abdominal pain. No nausea, vomiting, or hematemesis; No diarrhea or constipation. No melena or hematochezia.  GENITOURINARY: No dysuria, frequency or hematuria  NEUROLOGICAL: No numbness or weakness  SKIN: No itching, burning, rashes, or lesions   All other review of systems is negative unless indicated above.    PHYSICAL EXAM:  General: Lying uncomfortably in bed  HEENT: NCAT, EOMI, +NGT in place  Neck: Supple, nontender, no mass  Neurology: A&Ox3, nonfocal  Respiratory: CTA B/L, No wheezes, rales, rhonchi  CV: tachycardic, irregular, +S1/S2, no murmurs, rubs or gallops  Abdominal: Soft, diffusely tender to palpation, hypoactive bowel sounds  Extremities: No clubbing, cyanosis, edema; + open left lateral great toe wound currently bandaged    Vital Signs Last 24 Hrs  T(C): 37.2 (15 May 2019 07:33), Max: 37.7 (14 May 2019 12:00)  T(F): 98.9 (15 May 2019 07:33), Max: 99.8 (14 May 2019 12:00)  HR: 161 (15 May 2019 07:33) (53 - 166)  BP: 141/89 (15 May 2019 07:00) (118/75 - 171/91)  BP(mean): 108 (15 May 2019 07:00) (90 - 111)  RR: 20 (15 May 2019 07:33) (17 - 31)  SpO2: 98% (15 May 2019 07:33) (90% - 100%)               9.7    8.69  )-----------( 222      ( 15 May 2019 05:36 )             29.7     05-15    148<H>  |  111<H>  |  23  ----------------------------<  132<H>  3.4<L>   |  27  |  1.40<H>    Ca    8.5      15 May 2019 05:36  Phos  2.6     05-15  Mg     2.0     05-15    A/P: 48M PMH HTN, DM2 (not on meds), and diabetic neuropathy who presents with perforated gastric antral ulcer with chemical peritonitis, underwent emergent omentopexy and plication for perforated antral ulcer, POD 4 with rapid called for new onset afib with RVR and transfer to ICU  - s/p 5mg IV lopressor x 2 total during rapid with no change in heart rate, 150 amio x 1, 20mg cardizem x 1 with no conversion  - Patient seen and examined by cardiology (Dr. [place]), recs appreciated - will start amiodarone drip and check ECHO.  - ICU attending, Dr. Alfaro aware, RN to call with changes.

## 2019-05-16 LAB
ANION GAP SERPL CALC-SCNC: 7 MMOL/L — SIGNIFICANT CHANGE UP (ref 5–17)
BUN SERPL-MCNC: 18 MG/DL — SIGNIFICANT CHANGE UP (ref 7–23)
CALCIUM SERPL-MCNC: 8 MG/DL — LOW (ref 8.5–10.1)
CHLORIDE SERPL-SCNC: 108 MMOL/L — SIGNIFICANT CHANGE UP (ref 96–108)
CO2 SERPL-SCNC: 29 MMOL/L — SIGNIFICANT CHANGE UP (ref 22–31)
CREAT SERPL-MCNC: 1.2 MG/DL — SIGNIFICANT CHANGE UP (ref 0.5–1.3)
CULTURE RESULTS: SIGNIFICANT CHANGE UP
CULTURE RESULTS: SIGNIFICANT CHANGE UP
GLUCOSE SERPL-MCNC: 143 MG/DL — HIGH (ref 70–99)
HCT VFR BLD CALC: 31.8 % — LOW (ref 39–50)
HGB BLD-MCNC: 10.5 G/DL — LOW (ref 13–17)
MAGNESIUM SERPL-MCNC: 1.6 MG/DL — SIGNIFICANT CHANGE UP (ref 1.6–2.6)
MCHC RBC-ENTMCNC: 31.1 PG — SIGNIFICANT CHANGE UP (ref 27–34)
MCHC RBC-ENTMCNC: 33 GM/DL — SIGNIFICANT CHANGE UP (ref 32–36)
MCV RBC AUTO: 94.1 FL — SIGNIFICANT CHANGE UP (ref 80–100)
NRBC # BLD: 0 /100 WBCS — SIGNIFICANT CHANGE UP (ref 0–0)
PHOSPHATE SERPL-MCNC: 2.7 MG/DL — SIGNIFICANT CHANGE UP (ref 2.5–4.5)
PLATELET # BLD AUTO: 234 K/UL — SIGNIFICANT CHANGE UP (ref 150–400)
POTASSIUM SERPL-MCNC: 3.4 MMOL/L — LOW (ref 3.5–5.3)
POTASSIUM SERPL-SCNC: 3.4 MMOL/L — LOW (ref 3.5–5.3)
RBC # BLD: 3.38 M/UL — LOW (ref 4.2–5.8)
RBC # FLD: 11.9 % — SIGNIFICANT CHANGE UP (ref 10.3–14.5)
SODIUM SERPL-SCNC: 144 MMOL/L — SIGNIFICANT CHANGE UP (ref 135–145)
SPECIMEN SOURCE: SIGNIFICANT CHANGE UP
SPECIMEN SOURCE: SIGNIFICANT CHANGE UP
T4 FREE SERPL-MCNC: 1.5 NG/DL — SIGNIFICANT CHANGE UP (ref 0.9–1.8)
TSH SERPL-MCNC: 0.81 UIU/ML — SIGNIFICANT CHANGE UP (ref 0.36–3.74)
WBC # BLD: 10.7 K/UL — HIGH (ref 3.8–10.5)
WBC # FLD AUTO: 10.7 K/UL — HIGH (ref 3.8–10.5)

## 2019-05-16 PROCEDURE — 93306 TTE W/DOPPLER COMPLETE: CPT | Mod: 26

## 2019-05-16 PROCEDURE — 99291 CRITICAL CARE FIRST HOUR: CPT

## 2019-05-16 PROCEDURE — 99233 SBSQ HOSP IP/OBS HIGH 50: CPT | Mod: GC

## 2019-05-16 RX ORDER — POTASSIUM CHLORIDE 20 MEQ
20 PACKET (EA) ORAL
Refills: 0 | Status: COMPLETED | OUTPATIENT
Start: 2019-05-16 | End: 2019-05-16

## 2019-05-16 RX ORDER — ACETAMINOPHEN 500 MG
1000 TABLET ORAL ONCE
Refills: 0 | Status: COMPLETED | OUTPATIENT
Start: 2019-05-16 | End: 2019-05-16

## 2019-05-16 RX ORDER — ACETAMINOPHEN 500 MG
650 TABLET ORAL EVERY 6 HOURS
Refills: 0 | Status: DISCONTINUED | OUTPATIENT
Start: 2019-05-16 | End: 2019-05-28

## 2019-05-16 RX ORDER — POTASSIUM CHLORIDE 20 MEQ
10 PACKET (EA) ORAL
Refills: 0 | Status: COMPLETED | OUTPATIENT
Start: 2019-05-16 | End: 2019-05-16

## 2019-05-16 RX ORDER — HYDROMORPHONE HYDROCHLORIDE 2 MG/ML
0.5 INJECTION INTRAMUSCULAR; INTRAVENOUS; SUBCUTANEOUS EVERY 4 HOURS
Refills: 0 | Status: DISCONTINUED | OUTPATIENT
Start: 2019-05-16 | End: 2019-05-17

## 2019-05-16 RX ORDER — HYDROMORPHONE HYDROCHLORIDE 2 MG/ML
0.25 INJECTION INTRAMUSCULAR; INTRAVENOUS; SUBCUTANEOUS EVERY 4 HOURS
Refills: 0 | Status: DISCONTINUED | OUTPATIENT
Start: 2019-05-16 | End: 2019-05-17

## 2019-05-16 RX ORDER — PANTOPRAZOLE SODIUM 20 MG/1
40 TABLET, DELAYED RELEASE ORAL
Refills: 0 | Status: DISCONTINUED | OUTPATIENT
Start: 2019-05-16 | End: 2019-05-28

## 2019-05-16 RX ORDER — MAGNESIUM SULFATE 500 MG/ML
2 VIAL (ML) INJECTION ONCE
Refills: 0 | Status: COMPLETED | OUTPATIENT
Start: 2019-05-16 | End: 2019-05-16

## 2019-05-16 RX ADMIN — HYDROMORPHONE HYDROCHLORIDE 1 MILLIGRAM(S): 2 INJECTION INTRAMUSCULAR; INTRAVENOUS; SUBCUTANEOUS at 15:44

## 2019-05-16 RX ADMIN — SODIUM CHLORIDE 75 MILLILITER(S): 9 INJECTION, SOLUTION INTRAVENOUS at 05:26

## 2019-05-16 RX ADMIN — Medication 100 MILLIEQUIVALENT(S): at 09:10

## 2019-05-16 RX ADMIN — SODIUM CHLORIDE 75 MILLILITER(S): 9 INJECTION, SOLUTION INTRAVENOUS at 00:45

## 2019-05-16 RX ADMIN — Medication 100 MILLIEQUIVALENT(S): at 11:15

## 2019-05-16 RX ADMIN — SODIUM CHLORIDE 75 MILLILITER(S): 9 INJECTION, SOLUTION INTRAVENOUS at 15:15

## 2019-05-16 RX ADMIN — Medication 1 PATCH: at 20:49

## 2019-05-16 RX ADMIN — Medication 1 PATCH: at 12:05

## 2019-05-16 RX ADMIN — Medication 2: at 05:40

## 2019-05-16 RX ADMIN — PANTOPRAZOLE SODIUM 40 MILLIGRAM(S): 20 TABLET, DELAYED RELEASE ORAL at 05:10

## 2019-05-16 RX ADMIN — HYDROMORPHONE HYDROCHLORIDE 1 MILLIGRAM(S): 2 INJECTION INTRAMUSCULAR; INTRAVENOUS; SUBCUTANEOUS at 06:41

## 2019-05-16 RX ADMIN — HYDROMORPHONE HYDROCHLORIDE 1 MILLIGRAM(S): 2 INJECTION INTRAMUSCULAR; INTRAVENOUS; SUBCUTANEOUS at 15:14

## 2019-05-16 RX ADMIN — Medication 400 MILLIGRAM(S): at 12:04

## 2019-05-16 RX ADMIN — HYDROMORPHONE HYDROCHLORIDE 0.5 MILLIGRAM(S): 2 INJECTION INTRAMUSCULAR; INTRAVENOUS; SUBCUTANEOUS at 21:33

## 2019-05-16 RX ADMIN — Medication 0.25 MILLIGRAM(S): at 00:44

## 2019-05-16 RX ADMIN — Medication 20 MILLIEQUIVALENT(S): at 15:15

## 2019-05-16 RX ADMIN — PANTOPRAZOLE SODIUM 40 MILLIGRAM(S): 20 TABLET, DELAYED RELEASE ORAL at 18:29

## 2019-05-16 RX ADMIN — ENOXAPARIN SODIUM 80 MILLIGRAM(S): 100 INJECTION SUBCUTANEOUS at 15:15

## 2019-05-16 RX ADMIN — CHLORHEXIDINE GLUCONATE 1 APPLICATION(S): 213 SOLUTION TOPICAL at 12:06

## 2019-05-16 RX ADMIN — Medication 20 MILLIEQUIVALENT(S): at 12:06

## 2019-05-16 RX ADMIN — HYDROMORPHONE HYDROCHLORIDE 0.5 MILLIGRAM(S): 2 INJECTION INTRAMUSCULAR; INTRAVENOUS; SUBCUTANEOUS at 23:39

## 2019-05-16 RX ADMIN — Medication 1 PATCH: at 07:16

## 2019-05-16 RX ADMIN — ENOXAPARIN SODIUM 80 MILLIGRAM(S): 100 INJECTION SUBCUTANEOUS at 02:37

## 2019-05-16 RX ADMIN — Medication 1 PATCH: at 12:06

## 2019-05-16 RX ADMIN — Medication 50 GRAM(S): at 12:06

## 2019-05-16 RX ADMIN — Medication 100 MILLIEQUIVALENT(S): at 10:19

## 2019-05-16 RX ADMIN — Medication 1000 MILLIGRAM(S): at 12:34

## 2019-05-16 RX ADMIN — AMIODARONE HYDROCHLORIDE 33.33 MG/MIN: 400 TABLET ORAL at 05:25

## 2019-05-16 RX ADMIN — Medication 10 MG/HR: at 05:24

## 2019-05-16 RX ADMIN — HYDROMORPHONE HYDROCHLORIDE 1 MILLIGRAM(S): 2 INJECTION INTRAMUSCULAR; INTRAVENOUS; SUBCUTANEOUS at 07:17

## 2019-05-16 NOTE — PROGRESS NOTE ADULT - SUBJECTIVE AND OBJECTIVE BOX
Patient is a 48y old  Male who presents with a chief complaint of perforated stomach ulcer (16 May 2019 14:21)    24 hour events: Pt seen and examined at bedside. Chart/labs reviewed.   PAST MEDICAL & SURGICAL HISTORY:  Diabetes mellitus with no complication  No significant past surgical history      Review of Systems:  Constitutional: No fever, chills, fatigue  Neuro: No headache, numbness, weakness  Resp: No cough, wheezing, shortness of breath  CVS: No chest pain, palpitations, leg swelling  GI: No abdominal pain, nausea, vomiting, diarrhea   : No dysuria, frequency, incontinence  Skin: No itching, burning, rashes, or lesions   Msk: No joint pain or swelling  Psych: No depression, anxiety, mood swings    T(F): 97.9 (05-16-19 @ 16:02), Max: 98.8 (05-15-19 @ 23:38)  HR: 58 (05-16-19 @ 19:00) (52 - 156)  BP: 162/77 (05-16-19 @ 18:00) (122/60 - 167/77)  RR: 21 (05-16-19 @ 19:00) (13 - 29)  SpO2: 95% (05-16-19 @ 19:00) (91% - 97%)  Wt(kg): --        CAPILLARY BLOOD GLUCOSE      POCT Blood Glucose.: 128 mg/dL (16 May 2019 18:32)      I&O's Summary    15 May 2019 07:01  -  16 May 2019 07:00  --------------------------------------------------------  IN: 3213.4 mL / OUT: 4430 mL / NET: -1216.6 mL    16 May 2019 07:01  -  16 May 2019 19:28  --------------------------------------------------------  IN: 1500.4 mL / OUT: 1380 mL / NET: 120.4 mL        Physical Exam:     Gen:   Neuro: A&Ox3, non-focal  HEENT: NC/AT  Resp: CTA b/l  CVS: nl S1/S2, RRR  Abd: soft, nt, nd, +bs  Ext: no edema, +pulses  Skin: well perfused, warm    Meds:    amiodarone Infusion IV Continuous    dextrose 40% Gel Oral PRN  dextrose 50% Injectable IV Push  dextrose 50% Injectable IV Push  dextrose 50% Injectable IV Push  glucagon  Injectable IntraMuscular PRN  insulin lispro (HumaLOG) corrective regimen sliding scale SubCutaneous      acetaminophen   Tablet .. Oral PRN  HYDROmorphone  Injectable IV Push PRN  HYDROmorphone  Injectable IV Push PRN      enoxaparin Injectable SubCutaneous    pantoprazole    Tablet Oral      dextrose 5%. IV Continuous  lactated ringers. IV Continuous      chlorhexidine 2% Cloths Topical    nicotine -  14 mG/24Hr(s) Patch Transdermal                            10.5   10.70 )-----------( 234      ( 16 May 2019 05:15 )             31.8       05-16    144  |  108  |  18  ----------------------------<  143<H>  3.4<L>   |  29  |  1.20    Ca    8.0<L>      16 May 2019 05:15  Phos  2.7     05-16  Mg     1.6     05-16        CARDIAC MARKERS ( 16 May 2019 05:15 )  10.900 ng/mL / x     / 148 U/L / x     / 5.1 ng/mL  CARDIAC MARKERS ( 15 May 2019 22:50 )  13.200 ng/mL / x     / 205 U/L / x     / 8.1 ng/mL  CARDIAC MARKERS ( 15 May 2019 13:38 )  8.930 ng/mL / x     / 276 U/L / x     / 13.1 ng/mL  CARDIAC MARKERS ( 15 May 2019 05:36 )  .056 ng/mL / x     / 269 U/L / x     / 1.4 ng/mL          .Urine Catheterized   No growth -- 05-13 @ 09:26  .Sputum Sputum   Normal Respiratory Sophia present   Few polymorphonuclear leukocytes per low power field  Few Squamous epithelial cells per low power field  No organisms seen per oil power field 05-13 @ 01:10          CXR:    CTH:    CT Chest:    CT A/P:    TTE:        CENTRAL LINE: yes/no    RODRIGUEZ: yes/no    A-LINE: yes/no    GLOBAL ISSUE/BEST PRACTICE:  Analgesia:  Sedation:  HOB elevation: yes  Stress ulcer prophylaxis:  VTE prophylaxis:  Glycemic control:  Nutrition:      CODE STATUS: ***  GOC discussion: Y  Critical care time spent (mins): ***  (Reviewing data, imaging, discussing with multidisciplinary team, non inclusive of procedures, discussing goals of care with patient/family) 48M with PMHx of dm, diabetic neuropathy, who presented with abdominal pain, found to have perforated gastric antral ulcer with chemical peritonitis requiring emergent omentopexy and plication 5/11. Hospital course c/b jacob, afib with rvr, nstemi    24 hour events: Pt seen and examined at bedside. Chart/labs reviewed.     PAST MEDICAL & SURGICAL HISTORY:  Diabetes mellitus with no complication  No significant past surgical history      Review of Systems:    Constitutional: No fever, chills, fatigue  Neuro: No headache, numbness, weakness  Resp: No cough, wheezing, shortness of breath  CVS: No chest pain, palpitations, leg swelling  GI: No abdominal pain, nausea, vomiting, diarrhea   : No dysuria, frequency, incontinence  Skin: No itching, burning, rashes, or lesions   Msk: No joint pain or swelling  Psych: No depression, anxiety, mood swings    T(F): 97.9 (05-16-19 @ 16:02), Max: 98.8 (05-15-19 @ 23:38)  HR: 58 (05-16-19 @ 19:00) (52 - 156)  BP: 162/77 (05-16-19 @ 18:00) (122/60 - 167/77)  RR: 21 (05-16-19 @ 19:00) (13 - 29)  SpO2: 95% (05-16-19 @ 19:00) (91% - 97%)  Wt(kg): --        CAPILLARY BLOOD GLUCOSE      POCT Blood Glucose.: 128 mg/dL (16 May 2019 18:32)      I&O's Summary    15 May 2019 07:01  -  16 May 2019 07:00  --------------------------------------------------------  IN: 3213.4 mL / OUT: 4430 mL / NET: -1216.6 mL    16 May 2019 07:01  -  16 May 2019 19:28  --------------------------------------------------------  IN: 1500.4 mL / OUT: 1380 mL / NET: 120.4 mL        Physical Exam:     Gen: Well developed, Well nourished male  Neuro: A&Ox3, non-focal  HEENT: NC/AT  Resp: CTA b/l  CVS: nl S1/S2, RRR  Abd: incision site c/d/i, soft, nt, nd, +bs  Ext: no edema, +pulses  Skin: well perfused, warm      Meds:    amiodarone Infusion IV Continuous  insulin lispro (HumaLOG) corrective regimen sliding scale SubCutaneous  acetaminophen   Tablet .. Oral PRN  HYDROmorphone  Injectable IV Push PRN  HYDROmorphone  Injectable IV Push PRN  enoxaparin Injectable SubCutaneous  pantoprazole    Tablet Oral  dextrose 5%. IV Continuous  lactated ringers. IV Continuous  chlorhexidine 2% Cloths Topical  nicotine -  14 mG/24Hr(s) Patch Transdermal                            10.5   10.70 )-----------( 234      ( 16 May 2019 05:15 )             31.8       05-16    144  |  108  |  18  ----------------------------<  143<H>  3.4<L>   |  29  |  1.20    Ca    8.0<L>      16 May 2019 05:15  Phos  2.7     05-16  Mg     1.6     05-16        CARDIAC MARKERS ( 16 May 2019 05:15 )  10.900 ng/mL / x     / 148 U/L / x     / 5.1 ng/mL  CARDIAC MARKERS ( 15 May 2019 22:50 )  13.200 ng/mL / x     / 205 U/L / x     / 8.1 ng/mL  CARDIAC MARKERS ( 15 May 2019 13:38 )  8.930 ng/mL / x     / 276 U/L / x     / 13.1 ng/mL  CARDIAC MARKERS ( 15 May 2019 05:36 )  .056 ng/mL / x     / 269 U/L / x     / 1.4 ng/mL          .Urine Catheterized   No growth -- 05-13 @ 09:26  .Sputum Sputum   Normal Respiratory Sophia present   Few polymorphonuclear leukocytes per low power field  Few Squamous epithelial cells per low power field  No organisms seen per oil power field 05-13 @ 01:10          UE Venous Duplex:    EXAM: US DPLX UPR EXT VEINS LTD RT       PROCEDURE DATE: 05/15/2019         INTERPRETATION: CLINICAL INFORMATION: Right upper extremity swelling.     COMPARISON: None available.     TECHNIQUE: Duplex sonography of the RIGHT UPPER extremity with color and   spectral Doppler, with and without compression.     FINDINGS:     The right internal jugular, subclavian, axillary, brachial, basilic and   cephalic veins are patent and compressible where applicable.     Doppler examination shows normal spontaneous and phasic flow.     IMPRESSION:     No evidence of right upper extremity deep venous thrombosis.       TTE:  Obtained. Pending report      CENTRAL LINE: no    RODRIGUEZ: yes    A-LINE: no    GLOBAL ISSUE/BEST PRACTICE:  Analgesia: yes  Sedation: no  HOB elevation: yes  Stress ulcer prophylaxis: yes  VTE prophylaxis: yes  Glycemic control: yes  Nutrition: npo      CODE STATUS: Full  GOC discussion: Y

## 2019-05-16 NOTE — PROGRESS NOTE ADULT - ASSESSMENT
48M with PMHx of dm, diabetic neuropathy, who presented with abdominal pain, found to have perforated gastric antral ulcer with chemical peritonitis requiring emergent omentopexy and plication 5/11. Hospital course c/b obi, afib with rvr, nstemi    -Pain management  -Surgical management as per Dr. Mejia  -Ambulate as tolerated  -Now back in SR. Remains on amio gtt. Off cardizem gtt/digoxin.   -Troponin peaked at 13.2 . On therapeutic lovenox  -TTE pending report  -Cardiology recommendations appreciated  -Respiratory stable. Keep O2 saturation > 92%. Chest PT/IS  -NPO. NGT out. Will trial clears in am. PPI  -OBI improving. Monitor UOP/Lytes. Replete hypokalemia and hypomagnesemia  -Avoid nephrotoxic agents  - 48M with PMHx of dm, diabetic neuropathy, who presented with abdominal pain, found to have perforated gastric antral ulcer with chemical peritonitis requiring emergent omentopexy and plication 5/11. Hospital course c/b obi, afib with rvr, nstemi    -Pain management  -Surgical management as per Dr. Mejia  -Ambulate as tolerated  -Now back in SR. Remains on amio gtt. Off cardizem gtt/digoxin.   -Troponin peaked at 13.2 . On therapeutic lovenox  -TTE pending report  -Cardiology recommendations appreciated  -Respiratory stable. Keep O2 saturation > 92%. Chest PT/IS  -NPO. NGT out. Will trial clears in am. PPI  -OBI improving. Monitor UOP/Lytes. Replete hypokalemia and hypomagnesemia  -Avoid nephrotoxic agents  -Completed 5 day course of zosyn. Cultures negative. No evidence of active infection at this time  -Left foot medial hallux diabetic foot ulcer with purulent drainage. X-rays negative. F/U Podiatry  -DVT ppx  -Supportive care

## 2019-05-16 NOTE — PROGRESS NOTE ADULT - ASSESSMENT
Mr. Villareal is a 48 year old male with HTN and DM2, who presented with Perforated Antral Gastric Ulcer, POD 5 s/p Emergent Exp-lap Oomentopexy and plication. He had an RRT for atrial fibrillation with rapid ventricular response, which is new. He has had difficult to control periods of tachycardia and bradycardia, currently in a sinus rhythm this morning   He had an elevated troponin possibly suggestive of non-ST segment elevation myocardial infarction/demand ischemia. He has no known history of coronary artery disease      - Cont IV amio for now  - D/C dilt and digoxin  - echo with normal lv function, moderate mr; will repeat echo with elevated troponin  - Once he can tolerate po, we can change these medications over  - cont enoxaparin for now  - Watch creatinine and electrolytes. Keep K>4, Mg>2  - Will follow with you.  - will need ischemia evaluation in future once stable    Patient at high risk for decompensation given the above comorbidities and active medical issues.  I have personally spent >35 minutes  of critical care time in examining patient, reviewing chart, discussing care/management with patient/family, and ICU team.

## 2019-05-16 NOTE — PROGRESS NOTE ADULT - SUBJECTIVE AND OBJECTIVE BOX
POST OPERATIVE DAY #: 5  STATUS POST:  Minh patch secondary to perforated ulcer     SUBJECTIVE:  47 y/o M seen and examined at bedside. Patient with no new complaints at this time. He states he feels the same as he did yesterday with intermittent nausea. He is in mild abdominal pain. Patient is currently still NPO with an NGT. He admits to passing flatus, but no bowel movement yet. Patient denies any fevers, chills, chest pain, shortness of breath, vomiting or diarrhea.    Vital Signs Last 24 Hrs  T(C): 36.8 (16 May 2019 07:53), Max: 37.4 (15 May 2019 16:00)  T(F): 98.2 (16 May 2019 07:53), Max: 99.4 (15 May 2019 16:00)  HR: 53 (16 May 2019 09:00) (53 - 156)  BP: 137/62 (16 May 2019 09:00) (111/69 - 149/71)  BP(mean): 89 (16 May 2019 09:00) (84 - 109)  RR: 23 (16 May 2019 09:00) (13 - 29)  SpO2: 96% (16 May 2019 09:00) (92% - 97%)    PHYSICAL EXAM:  GENERAL: No acute distress, well-developed  HEAD:  Atraumatic, Normocephalic  ABDOMEN: Dressing clean, dry and intact. Incision healing well. JO drain in place. Abdomen soft, mildly tender to palpation throughout all 4 quadrants, mildly distended. Hypoactive bowel sounds x 4 quadrants.  NEUROLOGY: A&O x 3, no focal deficits    I&O's Summary    15 May 2019 07:01  -  16 May 2019 07:00  --------------------------------------------------------  IN: 3213.4 mL / OUT: 4430 mL / NET: -1216.6 mL    16 May 2019 07:01  -  16 May 2019 10:49  --------------------------------------------------------  IN: 183.4 mL / OUT: 300 mL / NET: -116.6 mL      I&O's Detail    15 May 2019 07:01  -  16 May 2019 07:00  --------------------------------------------------------  IN:    amiodarone Infusion: 270.4 mL    amiodarone Infusion: 198 mL    diltiazem Infusion: 230 mL    diltiazem Infusion: 15 mL    lactated ringers.: 1800 mL    Solution: 600 mL    Solution: 100 mL  Total IN: 3213.4 mL    OUT:    Bulb: 250 mL    Indwelling Catheter - Urethral: 3430 mL    Nasoenteral Tube: 750 mL  Total OUT: 4430 mL    Total NET: -1216.6 mL      16 May 2019 07:01  -  16 May 2019 10:49  --------------------------------------------------------  IN:    amiodarone Infusion: 33.4 mL    lactated ringers.: 150 mL  Total IN: 183.4 mL    OUT:    Indwelling Catheter - Urethral: 300 mL  Total OUT: 300 mL    Total NET: -116.6 mL        MEDICATIONS  (STANDING):  amiodarone Infusion 0.5 mG/Min (16.667 mL/Hr) IV Continuous <Continuous>  chlorhexidine 2% Cloths 1 Application(s) Topical daily  dextrose 5%. 1000 milliLiter(s) (50 mL/Hr) IV Continuous <Continuous>  dextrose 50% Injectable 12.5 Gram(s) IV Push once  dextrose 50% Injectable 25 Gram(s) IV Push once  dextrose 50% Injectable 25 Gram(s) IV Push once  enoxaparin Injectable 80 milliGRAM(s) SubCutaneous two times a day  insulin lispro (HumaLOG) corrective regimen sliding scale   SubCutaneous every 6 hours  lactated ringers. 1000 milliLiter(s) (75 mL/Hr) IV Continuous <Continuous>  nicotine -  14 mG/24Hr(s) Patch 1 patch Transdermal daily  pantoprazole  Injectable 40 milliGRAM(s) IV Push two times a day  potassium chloride  10 mEq/100 mL IVPB 10 milliEquivalent(s) IV Intermittent every 1 hour    MEDICATIONS  (PRN):  acetaminophen  Suppository .. 650 milliGRAM(s) Rectal every 6 hours PRN Temp greater or equal to 38C (100.4F)  dextrose 40% Gel 15 Gram(s) Oral once PRN Blood Glucose LESS THAN 70 milliGRAM(s)/deciliter  glucagon  Injectable 1 milliGRAM(s) IntraMuscular once PRN Glucose LESS THAN 70 milligrams/deciliter  HYDROmorphone  Injectable 0.5 milliGRAM(s) IV Push every 4 hours PRN Moderate Pain (4 - 6)  HYDROmorphone  Injectable 1 milliGRAM(s) IV Push every 4 hours PRN Severe Pain (7 - 10)    LABS:                        10.5   10.70 )-----------( 234      ( 16 May 2019 05:15 )             31.8     05-16    144  |  108  |  18  ----------------------------<  143<H>  3.4<L>   |  29  |  1.20    Ca    8.0<L>      16 May 2019 05:15  Phos  2.7     05-16  Mg     1.6     05-16    ASSESSMENT  47 y/o M POD#5 s/p Minh patch secondary to perforated ulcer, currently NPO with NGT, +F, no BM, new onset Afib, elevated Troponin levels.    PLAN  - incentive spirometer  - pain control  - care as per ICU team  - Cardio rec appreciated   - OOB, ambulate as tolerated.  - NPO, IVF  - Monitor NGT outputs  - Monitor Aguilera outputs  - serial abdominal exams  - labs in AM  -Case discussed with Dr. Mejia    Surgical Team Contact Information  Spectralink: Ext: [phone] or [phone]  Pager: [phone]

## 2019-05-16 NOTE — PROGRESS NOTE ADULT - ASSESSMENT
48M PMH HTN, DM2 (not on meds), and diabetic neuropathy who presents with perforated gastric antral ulcer with chemical peritonitis, underwent emergent omentopexy and plication for perforated antral ulcer, POD 5. Course complicated by new-onset A-Fib with RVR and NSTEMI.    --Neuro: Pain control with acetaminophen and hydromorphone prn, will give one dose IV tylenol to attempt to decrease need for hydromorphone  --Cardiac: Patient converted to sinus rhythm s/p amiodarone load (IV), diltiazem gtt and digoxin load. Will d/c diltiazem and digoxin and continue amiodarone. Patient with NSTEMI, troponins downtrending. Will continue full dose lovenox. Remains HD stable, continue LR@75/hr until switch to PO diet.  --Pulm: atelectasis, continue pain control, incentive spirometry, out of bed to chair, ambulate with assistance. Supplemental oxygen with NC, goal SpO2>90%. BiPAP qhs prn. Continue nicotine patch for smoking cessation  --GI: NG tube clamped by surgery. Keep NPO for now with plan to check residuals at 3PM. If minimal will switch to clear diet. JO with serosanguinous drainage. Continue PPI bid for perforated ulcer   --Renal: improving OBI, continue LR 75/hr. Urinary retention s/p jacques replacement. Will re-assess regarding removing jacques when more ambulatory. Will replete hypokalemia - s/p 3 K riders, will give oral x 2 in addition to 2gm Mg x 2.   --ID: Completed 5 day course of zosyn. Blood and urine cultures negative. Sputum culture with normal respiratory jakub. F/up podiatry regarding left foot medial hallux diabetic foot ulcer with purulent drainage. X-rays negative. Continue local wound care. Patient declining MRI until abdominal surgical staples removed  --Endo: continue moderate insulin coverage scale, A1C 8.0  - Heme: stable H/H, DVT ppx (on full dose lovenox)  --Dispo: full code

## 2019-05-16 NOTE — PROGRESS NOTE ADULT - SUBJECTIVE AND OBJECTIVE BOX
Patient is a 48y old  Male who presents with a chief complaint of perforated stomach ulcer (16 May 2019 10:49)    Interval History: No significant events overnight. Endorses flatus, denies fever/chills, palpitations, chest pain, nausea/vomiting, has not had BM.    REVIEW OF SYSTEMS  Constitutional: No fever, chills, fatigue  Neuro: No headache, numbness, weakness  Resp: No cough, wheezing, shortness of breath  CVS: No chest pain, palpitations, leg swelling  GI: No abdominal pain, nausea, vomiting, diarrhea   : No dysuria, frequency, incontinence  Skin: No itching, burning, rashes, or lesions   Msk: No joint pain or swelling  Psych: No depression, anxiety, mood swings  Heme: No bleeding    T(F): 98.4 (05-16-19 @ 12:00), Max: 99.4 (05-15-19 @ 16:00)  HR: 53 (05-16-19 @ 13:00) (52 - 156)  BP: 144/68 (05-16-19 @ 13:00) (122/60 - 157/76)  RR: 21 (05-16-19 @ 13:00) (13 - 29)  SpO2: 96% (05-16-19 @ 13:00) (92% - 97%)  Wt(kg): --      CAPILLARY BLOOD GLUCOSE      POCT Blood Glucose.: 148 mg/dL (16 May 2019 12:07)  POCT Blood Glucose.: 153 mg/dL (16 May 2019 05:37)  POCT Blood Glucose.: 147 mg/dL (16 May 2019 00:21)  POCT Blood Glucose.: 159 mg/dL (15 May 2019 17:44)    I&O's Summary    05-15 @ 07:01  -  05-16 @ 07:00  --------------------------------------------------------  IN: 3213.4 mL / OUT: 4430 mL / NET: -1216.6 mL    05-16 @ 07:01  -  05-16 @ 14:22  --------------------------------------------------------  IN: 950.2 mL / OUT: 750 mL / NET: 200.2 mL    Physical Exam  General: NAD  CNS: A&Ox3, no focal deficits  HEENT: +NGT draining yellow-brown fluid  Resp: CTAB, no wheezes, rales or rhonchi  CVS: regular rate, +S1, S2 no murmurs, rubs or gallops  Abd: surgical bandage clean/dry/ intact, JO draining serosanguinous fluid, + bowel sounds, diffusely tender to palpation  Ext: + open ulcer on medial left great toe, currently bandaged    MEDICATIONS    amiodarone Infusion IV Continuous  dextrose 40% Gel Oral PRN  dextrose 50% Injectable IV Push  dextrose 50% Injectable IV Push  dextrose 50% Injectable IV Push  glucagon  Injectable IntraMuscular PRN  insulin lispro (HumaLOG) corrective regimen sliding scale SubCutaneous  acetaminophen  Suppository .. Rectal PRN  HYDROmorphone  Injectable IV Push PRN  HYDROmorphone  Injectable IV Push PRN  enoxaparin Injectable SubCutaneous    pantoprazole  Injectable IV Push      dextrose 5%. IV Continuous  lactated ringers. IV Continuous  potassium chloride    Tablet ER Oral      chlorhexidine 2% Cloths Topical    nicotine -  14 mG/24Hr(s) Patch Transdermal                          10.5   10.70 )-----------( 234      ( 16 May 2019 05:15 )             31.8       05-16    144  |  108  |  18  ----------------------------<  143<H>  3.4<L>   |  29  |  1.20    Ca    8.0<L>      16 May 2019 05:15  Phos  2.7     05-16  Mg     1.6     05-16        CARDIAC MARKERS ( 16 May 2019 05:15 )  10.900 ng/mL / x     / 148 U/L / x     / 5.1 ng/mL  CARDIAC MARKERS ( 15 May 2019 22:50 )  13.200 ng/mL / x     / 205 U/L / x     / 8.1 ng/mL  CARDIAC MARKERS ( 15 May 2019 13:38 )  8.930 ng/mL / x     / 276 U/L / x     / 13.1 ng/mL  CARDIAC MARKERS ( 15 May 2019 05:36 )  .056 ng/mL / x     / 269 U/L / x     / 1.4 ng/mL          .Urine Catheterized   No growth -- 05-13 @ 09:26  .Sputum Sputum   Normal Respiratory Sophia present   Few polymorphonuclear leukocytes per low power field  Few Squamous epithelial cells per low power field  No organisms seen per oil power field 05-13 @ 01:10    CENTRAL LINE: N  RODRIGUEZ: Y                        DATE INSERTED: 5/12               A-LINE: N      Analgesia: PRN dilaudid and tylenol  Sedation: N  HOB elevation: Y  Stress ulcer prophylaxis: IV protonix BID  VTE prophylaxis: lovenox   Glycemic control: moderate dose sliding scale  Nutrition: NPO

## 2019-05-17 LAB
ANION GAP SERPL CALC-SCNC: 9 MMOL/L — SIGNIFICANT CHANGE UP (ref 5–17)
BUN SERPL-MCNC: 20 MG/DL — SIGNIFICANT CHANGE UP (ref 7–23)
CALCIUM SERPL-MCNC: 7.9 MG/DL — LOW (ref 8.5–10.1)
CHLORIDE SERPL-SCNC: 108 MMOL/L — SIGNIFICANT CHANGE UP (ref 96–108)
CHOLEST SERPL-MCNC: 105 MG/DL — SIGNIFICANT CHANGE UP (ref 10–199)
CO2 SERPL-SCNC: 28 MMOL/L — SIGNIFICANT CHANGE UP (ref 22–31)
CREAT SERPL-MCNC: 1.2 MG/DL — SIGNIFICANT CHANGE UP (ref 0.5–1.3)
GLUCOSE SERPL-MCNC: 131 MG/DL — HIGH (ref 70–99)
HCT VFR BLD CALC: 31 % — LOW (ref 39–50)
HDLC SERPL-MCNC: 22 MG/DL — LOW
HGB BLD-MCNC: 10.2 G/DL — LOW (ref 13–17)
LIPID PNL WITH DIRECT LDL SERPL: 55 MG/DL — SIGNIFICANT CHANGE UP
MAGNESIUM SERPL-MCNC: 2.1 MG/DL — SIGNIFICANT CHANGE UP (ref 1.6–2.6)
MCHC RBC-ENTMCNC: 31.1 PG — SIGNIFICANT CHANGE UP (ref 27–34)
MCHC RBC-ENTMCNC: 32.9 GM/DL — SIGNIFICANT CHANGE UP (ref 32–36)
MCV RBC AUTO: 94.5 FL — SIGNIFICANT CHANGE UP (ref 80–100)
NRBC # BLD: 0 /100 WBCS — SIGNIFICANT CHANGE UP (ref 0–0)
PHOSPHATE SERPL-MCNC: 2.8 MG/DL — SIGNIFICANT CHANGE UP (ref 2.5–4.5)
PLATELET # BLD AUTO: 262 K/UL — SIGNIFICANT CHANGE UP (ref 150–400)
POTASSIUM SERPL-MCNC: 3.6 MMOL/L — SIGNIFICANT CHANGE UP (ref 3.5–5.3)
POTASSIUM SERPL-SCNC: 3.6 MMOL/L — SIGNIFICANT CHANGE UP (ref 3.5–5.3)
RBC # BLD: 3.28 M/UL — LOW (ref 4.2–5.8)
RBC # FLD: 12 % — SIGNIFICANT CHANGE UP (ref 10.3–14.5)
SODIUM SERPL-SCNC: 145 MMOL/L — SIGNIFICANT CHANGE UP (ref 135–145)
TOTAL CHOLESTEROL/HDL RATIO MEASUREMENT: 4.8 RATIO — SIGNIFICANT CHANGE UP (ref 3.4–9.6)
TRIGL SERPL-MCNC: 140 MG/DL — SIGNIFICANT CHANGE UP (ref 10–149)
WBC # BLD: 10.62 K/UL — HIGH (ref 3.8–10.5)
WBC # FLD AUTO: 10.62 K/UL — HIGH (ref 3.8–10.5)

## 2019-05-17 PROCEDURE — 99233 SBSQ HOSP IP/OBS HIGH 50: CPT | Mod: GC

## 2019-05-17 PROCEDURE — 99291 CRITICAL CARE FIRST HOUR: CPT

## 2019-05-17 RX ORDER — HYDROMORPHONE HYDROCHLORIDE 2 MG/ML
0.25 INJECTION INTRAMUSCULAR; INTRAVENOUS; SUBCUTANEOUS EVERY 4 HOURS
Refills: 0 | Status: DISCONTINUED | OUTPATIENT
Start: 2019-05-17 | End: 2019-05-24

## 2019-05-17 RX ORDER — AMIODARONE HYDROCHLORIDE 400 MG/1
200 TABLET ORAL DAILY
Refills: 0 | Status: DISCONTINUED | OUTPATIENT
Start: 2019-05-19 | End: 2019-05-28

## 2019-05-17 RX ORDER — AMIODARONE HYDROCHLORIDE 400 MG/1
TABLET ORAL
Refills: 0 | Status: DISCONTINUED | OUTPATIENT
Start: 2019-05-19 | End: 2019-05-28

## 2019-05-17 RX ORDER — TAMSULOSIN HYDROCHLORIDE 0.4 MG/1
0.4 CAPSULE ORAL AT BEDTIME
Refills: 0 | Status: DISCONTINUED | OUTPATIENT
Start: 2019-05-17 | End: 2019-05-20

## 2019-05-17 RX ORDER — POTASSIUM CHLORIDE 20 MEQ
40 PACKET (EA) ORAL EVERY 4 HOURS
Refills: 0 | Status: COMPLETED | OUTPATIENT
Start: 2019-05-17 | End: 2019-05-17

## 2019-05-17 RX ORDER — AMIODARONE HYDROCHLORIDE 400 MG/1
400 TABLET ORAL EVERY 8 HOURS
Refills: 0 | Status: COMPLETED | OUTPATIENT
Start: 2019-05-17 | End: 2019-05-19

## 2019-05-17 RX ORDER — OXYCODONE AND ACETAMINOPHEN 5; 325 MG/1; MG/1
1 TABLET ORAL EVERY 6 HOURS
Refills: 0 | Status: DISCONTINUED | OUTPATIENT
Start: 2019-05-17 | End: 2019-05-18

## 2019-05-17 RX ADMIN — Medication 1 PATCH: at 19:43

## 2019-05-17 RX ADMIN — Medication 40 MILLIEQUIVALENT(S): at 13:34

## 2019-05-17 RX ADMIN — Medication 40 MILLIEQUIVALENT(S): at 09:39

## 2019-05-17 RX ADMIN — PANTOPRAZOLE SODIUM 40 MILLIGRAM(S): 20 TABLET, DELAYED RELEASE ORAL at 17:06

## 2019-05-17 RX ADMIN — Medication 2: at 16:56

## 2019-05-17 RX ADMIN — CHLORHEXIDINE GLUCONATE 1 APPLICATION(S): 213 SOLUTION TOPICAL at 11:39

## 2019-05-17 RX ADMIN — ENOXAPARIN SODIUM 80 MILLIGRAM(S): 100 INJECTION SUBCUTANEOUS at 02:58

## 2019-05-17 RX ADMIN — Medication 1 PATCH: at 11:38

## 2019-05-17 RX ADMIN — PANTOPRAZOLE SODIUM 40 MILLIGRAM(S): 20 TABLET, DELAYED RELEASE ORAL at 05:24

## 2019-05-17 RX ADMIN — SODIUM CHLORIDE 75 MILLILITER(S): 9 INJECTION, SOLUTION INTRAVENOUS at 05:24

## 2019-05-17 RX ADMIN — HYDROMORPHONE HYDROCHLORIDE 0.25 MILLIGRAM(S): 2 INJECTION INTRAMUSCULAR; INTRAVENOUS; SUBCUTANEOUS at 22:50

## 2019-05-17 RX ADMIN — Medication 4: at 11:39

## 2019-05-17 RX ADMIN — Medication 1 PATCH: at 11:45

## 2019-05-17 RX ADMIN — AMIODARONE HYDROCHLORIDE 400 MILLIGRAM(S): 400 TABLET ORAL at 21:55

## 2019-05-17 RX ADMIN — HYDROMORPHONE HYDROCHLORIDE 0.5 MILLIGRAM(S): 2 INJECTION INTRAMUSCULAR; INTRAVENOUS; SUBCUTANEOUS at 06:46

## 2019-05-17 RX ADMIN — Medication 2: at 23:51

## 2019-05-17 RX ADMIN — ENOXAPARIN SODIUM 80 MILLIGRAM(S): 100 INJECTION SUBCUTANEOUS at 14:50

## 2019-05-17 RX ADMIN — AMIODARONE HYDROCHLORIDE 400 MILLIGRAM(S): 400 TABLET ORAL at 09:39

## 2019-05-17 RX ADMIN — AMIODARONE HYDROCHLORIDE 400 MILLIGRAM(S): 400 TABLET ORAL at 13:34

## 2019-05-17 RX ADMIN — Medication 1 PATCH: at 07:36

## 2019-05-17 RX ADMIN — HYDROMORPHONE HYDROCHLORIDE 0.25 MILLIGRAM(S): 2 INJECTION INTRAMUSCULAR; INTRAVENOUS; SUBCUTANEOUS at 22:07

## 2019-05-17 RX ADMIN — TAMSULOSIN HYDROCHLORIDE 0.4 MILLIGRAM(S): 0.4 CAPSULE ORAL at 21:55

## 2019-05-17 RX ADMIN — HYDROMORPHONE HYDROCHLORIDE 0.5 MILLIGRAM(S): 2 INJECTION INTRAMUSCULAR; INTRAVENOUS; SUBCUTANEOUS at 05:24

## 2019-05-17 NOTE — PROGRESS NOTE ADULT - SUBJECTIVE AND OBJECTIVE BOX
Patient is a 48y old  Male who presents with a chief complaint of perforated stomach ulcer (17 May 2019 10:31)    Interval History: No significant events overnight. Patient endorses flatus, denies having a BM, fever/chills, chest pain/palpitations, abdominal pain, nausea/vomiting.    REVIEW OF SYSTEMS  Constitutional: No fever, chills, fatigue  Neuro: No headache, numbness, weakness  Resp: No cough, wheezing, shortness of breath  CVS: No chest pain, palpitations, leg swelling  GI: No abdominal pain, nausea, vomiting, diarrhea   : No dysuria, frequency, incontinence  Skin: No itching, burning, rashes, or lesions   Msk: No joint pain or swelling  Psych: No depression, anxiety, mood swings  Heme: No bleeding    T(F): 99.3 (05-17-19 @ 08:00), Max: 99.3 (05-16-19 @ 20:30)  HR: 60 (05-17-19 @ 11:00) (53 - 80)  BP: 145/71 (05-17-19 @ 11:00) (144/68 - 173/80)  RR: 27 (05-17-19 @ 11:00) (15 - 30)  SpO2: 91% (05-17-19 @ 11:00) (90% - 100%)  Wt(kg): --      CAPILLARY BLOOD GLUCOSE      POCT Blood Glucose.: 202 mg/dL (17 May 2019 11:36)  POCT Blood Glucose.: 137 mg/dL (17 May 2019 05:33)  POCT Blood Glucose.: 123 mg/dL (17 May 2019 00:38)  POCT Blood Glucose.: 128 mg/dL (16 May 2019 18:32)  POCT Blood Glucose.: 148 mg/dL (16 May 2019 12:07)    I&O's Summary    05-16 @ 07:01  -  05-17 @ 07:00  --------------------------------------------------------  IN: 2901.4 mL / OUT: 2370 mL / NET: 531.4 mL    05-17 @ 07:01  -  05-17 @ 11:50  --------------------------------------------------------  IN: 743.4 mL / OUT: 515 mL / NET: 228.4 mL      PHYSICAL EXAM  General: NAD  CNS: A&Ox3, no focal deficits  HEENT: Normocephalic, atraumatic, NGT removed  Resp: CTAB, no wheezes, rales or rhonchi  CVS: regular rate, +S1, S2 no murmurs, rubs or gallops  Abd: surgical bandage clean/dry/ intact, JO draining serosanguinous fluid, + bowel sounds, minimally tender to palpation  Ext: + open ulcer on medial left great toe, currently bandaged    MEDICATIONS    amiodarone    Tablet Oral  amiodarone    Tablet Oral    dextrose 40% Gel Oral PRN  dextrose 50% Injectable IV Push  dextrose 50% Injectable IV Push  dextrose 50% Injectable IV Push  glucagon  Injectable IntraMuscular PRN  insulin lispro (HumaLOG) corrective regimen sliding scale SubCutaneous      acetaminophen   Tablet .. Oral PRN  HYDROmorphone  Injectable IV Push PRN  oxyCODONE    5 mG/acetaminophen 325 mG Oral PRN      enoxaparin Injectable SubCutaneous    pantoprazole    Tablet Oral      dextrose 5%. IV Continuous  potassium chloride    Tablet ER Oral      chlorhexidine 2% Cloths Topical    nicotine -  14 mG/24Hr(s) Patch Transdermal                          10.2   10.62 )-----------( 262      ( 17 May 2019 05:05 )             31.0       05-17    145  |  108  |  20  ----------------------------<  131<H>  3.6   |  28  |  1.20    Ca    7.9<L>      17 May 2019 05:05  Phos  2.8     05-17  Mg     2.1     05-17        CARDIAC MARKERS ( 17 May 2019 05:05 )  8.110 ng/mL / x     / x     / x     / x      CARDIAC MARKERS ( 16 May 2019 05:15 )  10.900 ng/mL / x     / 148 U/L / x     / 5.1 ng/mL  CARDIAC MARKERS ( 15 May 2019 22:50 )  13.200 ng/mL / x     / 205 U/L / x     / 8.1 ng/mL  CARDIAC MARKERS ( 15 May 2019 13:38 )  8.930 ng/mL / x     / 276 U/L / x     / 13.1 ng/mL          .Urine Catheterized   No growth -- 05-13 @ 09:26  .Sputum Sputum   Normal Respiratory Sophia present   Few polymorphonuclear leukocytes per low power field  Few Squamous epithelial cells per low power field  No organisms seen per oil power field 05-13 @ 01:10    CENTRAL LINE: N  RODRIGUEZ: N                       DATE INSERTED: 5/12             DATE REMOVED: 5/17  A-LINE: N      GLOBAL ISSUE/BEST PRACTICE:  Analgesia: PRN dilaudid, oxycodone and tylenol  Sedation: N  HOB elevation: Y  Stress ulcer prophylaxis: oral protonix   VTE prophylaxis: full lovenox   Glycemic control: moderate dose sliding scale  Nutrition: clear liquid diet

## 2019-05-17 NOTE — PROGRESS NOTE ADULT - ASSESSMENT
48M PMH HTN, DM2 (not on meds), and diabetic neuropathy who presents with perforated gastric antral ulcer with chemical peritonitis, underwent emergent omentopexy and plication for perforated antral ulcer, POD 6, course complicated by new-onset A-Fib with RVR and NSTEMI, now converted to NSR.    1. Neuro: pain regimen adjusted to PRN dilaudid, oxycodone and tylenol. PT consult declined today as hospital policy is to not provide PT if troponins are elevated. Will add troponins on to AM labs and call back PT.   2. Cardiac: remains in NSR, amiodarone gtt switched to oral amiodarone. s/p diltiazem drip and digoxin load 5/15. Continue enoxaparin for NSTEMI/A-Fib. Will eventually require ischemic eval, f/up cards. Remains HD stable.  3. Pulm: atelectasis, continue pain control, incentive spirometry, out of bed to chair, ambulate with assistance. Supplemental oxygen with NC, goal SpO2>90%. BiPAP qhs prn. Continue nicotine patch for smoking cessation  4. GI: NG tube removed, patient adjusted clear liquid diet this AM. If able to tolerate lunch will switch to full liquid diet this evening. LR 75cc/hr discontinued as tolerating PO. JO with serous drainage. Continue oral PPI for perforated ulcer.  5. Renal: Aguilera catheter removed today, monitor for ability to voluntarily void.  6. ID: s/p Zosyn for 5 days. No evidence of infection at present. Blood and urine cultures negative. Sputum culture with normal respiratory jakub. F/up podiatry regarding left foot medial hallux diabetic foot ulcer with purulent drainage. X-rays negative. Continue local wound care. Patient declining MRI until abdominal surgical staples removed  7. Endo: continue insulin coverage scale, A1C 8.0  8. Heme: stable H/H, DVT ppx (on lovenox)  9. Dispo: full code, discussed with patient at bedside . 48M PMH HTN, DM2 (not on meds), and diabetic neuropathy who presents with perforated gastric antral ulcer with chemical peritonitis, underwent emergent omentopexy and plication for perforated antral ulcer, POD 6, course complicated by new-onset A-Fib with RVR and NSTEMI, now converted to NSR.    1. Neuro: pain regimen adjusted to PRN dilaudid, oxycodone and tylenol. PT consult declined today as hospital policy is to not provide PT if troponins are elevated. Will add troponins on to AM labs and call back PT.   2. Cardiac: remains in NSR, amiodarone gtt switched to oral amiodarone. s/p diltiazem drip and digoxin load 5/15. Continue enoxaparin for NSTEMI/A-Fib. Will eventually require ischemic eval, f/up cards. Remains HD stable. Repeat ECHO performed after NSTEMI relatively unchanged from ECHO performed 5/13.  3. Pulm: atelectasis, continue pain control, incentive spirometry, out of bed to chair, ambulate with assistance. Supplemental oxygen with NC, goal SpO2>90%. BiPAP qhs prn. Continue nicotine patch for smoking cessation  4. GI: NG tube removed, patient adjusted clear liquid diet this AM. If able to tolerate lunch will switch to full liquid diet this evening. LR 75cc/hr discontinued as tolerating PO. JO with serous drainage. Continue oral PPI for perforated ulcer.  5. Renal: Aguilera catheter removed today, monitor for ability to voluntarily void.  6. ID: s/p Zosyn for 5 days. No evidence of infection at present. Blood and urine cultures negative. Sputum culture with normal respiratory jakub. F/up podiatry regarding left foot medial hallux diabetic foot ulcer with purulent drainage. X-rays negative. Continue local wound care. Patient declining MRI until abdominal surgical staples removed  7. Endo: continue insulin coverage scale, A1C 8.0  8. Heme: stable H/H, DVT ppx (on lovenox)  9. Dispo: full code, discussed with patient at bedside. 48M PMH HTN, DM2 (not on meds), and diabetic neuropathy who presents with perforated gastric antral ulcer with chemical peritonitis, underwent emergent omentopexy and plication for perforated antral ulcer, POD 6, course complicated by new-onset A-Fib with RVR and NSTEMI, now converted to NSR.    1. Neuro: pain regimen adjusted to PRN dilaudid, oxycodone and tylenol. PT consult declined today as hospital policy is to not provide PT if troponins are elevated. Will add troponins on to AM labs and call back PT.   2. Cardiac: remains in NSR, amiodarone gtt switched to oral amiodarone. s/p diltiazem drip and digoxin load 5/15. Continue enoxaparin for NSTEMI/A-Fib. Will eventually require ischemic eval, f/up cards. Remains HD stable. Repeat ECHO performed after NSTEMI relatively unchanged from ECHO performed 5/13.  3. Pulm: atelectasis, continue pain control, incentive spirometry, out of bed to chair, ambulate with assistance. Supplemental oxygen with NC, goal SpO2>90%. BiPAP qhs prn. Continue nicotine patch for smoking cessation  4. GI: NG tube removed, patient adjusted clear liquid diet this AM. If able to tolerate lunch will switch to full liquid diet this evening. LR 75cc/hr discontinued as tolerating PO. JO with serous drainage. Continue oral PPI for perforated ulcer.  5. Renal: Aguilera catheter removed today, monitor for ability to voluntarily void. K repleted, goal to keep K>4, Mg>2.  6. ID: s/p Zosyn for 5 days. No evidence of infection at present. Blood and urine cultures negative. Sputum culture with normal respiratory jakub. F/up podiatry regarding left foot medial hallux diabetic foot ulcer with purulent drainage. X-rays negative. Continue local wound care. Patient declining MRI until abdominal surgical staples removed  7. Endo: continue insulin coverage scale, A1C 8.0  8. Heme: stable H/H, DVT ppx (on lovenox)  9. Dispo: full code, discussed with patient at bedside.

## 2019-05-17 NOTE — PROGRESS NOTE ADULT - SUBJECTIVE AND OBJECTIVE BOX
Afeb VSS  Tolerating clears this AM  Passing lots of flatus.  Abd: nontender, nondistended  P: full liquids tonight, reg diet in AM

## 2019-05-17 NOTE — PROGRESS NOTE ADULT - SUBJECTIVE AND OBJECTIVE BOX
POST OPERATIVE DAY #: 6  STATUS POST:  Minh patch for perforated ulcer     SUBJECTIVE:  49 y/o M seen and examined at bedside with no overnight events.  Patient with no new complaints at this time, states he is feeling better without the NGT.  He is tolerating clear liquid diet and getting out of bed to chair. Admits to flatus, but no bowel movement. Patient denies any fevers, chills, chest pain, shortness of breath, nausea, vomiting or diarrhea.    Vital Signs Last 24 Hrs  T(C): 37.4 (17 May 2019 08:00), Max: 37.4 (16 May 2019 20:30)  T(F): 99.3 (17 May 2019 08:00), Max: 99.3 (16 May 2019 20:30)  HR: 66 (17 May 2019 10:00) (53 - 80)  BP: 151/77 (17 May 2019 10:00) (144/68 - 173/80)  BP(mean): 107 (17 May 2019 10:00) (98 - 117)  RR: 24 (17 May 2019 10:00) (15 - 30)  SpO2: 91% (17 May 2019 10:00) (90% - 100%)    PHYSICAL EXAM:  GENERAL: NAD, well-developed  HEAD:  Atraumatic, Normocephalic  ABDOMEN: Dressing clean, dry and intact. JO drain in place with serosanguinous fluid. Abdomen soft, mildly tender to palpation x 4, non-distended. Normal bowel sounds x 4 quadrants.  NEUROLOGY: A&O x 3    I&O's Summary    16 May 2019 07:01  -  17 May 2019 07:00  --------------------------------------------------------  IN: 2901.4 mL / OUT: 2370 mL / NET: 531.4 mL    17 May 2019 07:01  -  17 May 2019 10:31  --------------------------------------------------------  IN: 423.4 mL / OUT: 515 mL / NET: -91.6 mL      I&O's Detail    16 May 2019 07:01  -  17 May 2019 07:00  --------------------------------------------------------  IN:    amiodarone Infusion: 701.4 mL    lactated ringers.: 1800 mL    Solution: 300 mL    Solution: 100 mL  Total IN: 2901.4 mL    OUT:    Bulb: 220 mL    Indwelling Catheter - Urethral: 2130 mL    Nasoenteral Tube: 20 mL  Total OUT: 2370 mL    Total NET: 531.4 mL      17 May 2019 07:01  -  17 May 2019 10:32  --------------------------------------------------------  IN:    amiodarone Infusion: 33.4 mL    lactated ringers.: 150 mL    Oral Fluid: 240 mL  Total IN: 423.4 mL    OUT:    Bulb: 40 mL    Indwelling Catheter - Urethral: 475 mL  Total OUT: 515 mL    Total NET: -91.6 mL    MEDICATIONS  (STANDING):  amiodarone    Tablet   Oral   amiodarone    Tablet 400 milliGRAM(s) Oral every 8 hours  chlorhexidine 2% Cloths 1 Application(s) Topical daily  dextrose 5%. 1000 milliLiter(s) (50 mL/Hr) IV Continuous <Continuous>  dextrose 50% Injectable 12.5 Gram(s) IV Push once  dextrose 50% Injectable 25 Gram(s) IV Push once  dextrose 50% Injectable 25 Gram(s) IV Push once  enoxaparin Injectable 80 milliGRAM(s) SubCutaneous two times a day  insulin lispro (HumaLOG) corrective regimen sliding scale   SubCutaneous every 6 hours  lactated ringers. 1000 milliLiter(s) (75 mL/Hr) IV Continuous <Continuous>  nicotine -  14 mG/24Hr(s) Patch 1 patch Transdermal daily  pantoprazole    Tablet 40 milliGRAM(s) Oral two times a day  potassium chloride    Tablet ER 40 milliEquivalent(s) Oral every 4 hours    MEDICATIONS  (PRN):  acetaminophen   Tablet .. 650 milliGRAM(s) Oral every 6 hours PRN Mild Pain (1 - 3)  dextrose 40% Gel 15 Gram(s) Oral once PRN Blood Glucose LESS THAN 70 milliGRAM(s)/deciliter  glucagon  Injectable 1 milliGRAM(s) IntraMuscular once PRN Glucose LESS THAN 70 milligrams/deciliter  HYDROmorphone  Injectable 0.25 milliGRAM(s) IV Push every 4 hours PRN Severe Pain (7 - 10)  oxyCODONE    5 mG/acetaminophen 325 mG 1 Tablet(s) Oral every 6 hours PRN Moderate Pain (4 - 6)    LABS:                        10.2   10.62 )-----------( 262      ( 17 May 2019 05:05 )             31.0     05-17    145  |  108  |  20  ----------------------------<  131<H>  3.6   |  28  |  1.20    Ca    7.9<L>      17 May 2019 05:05  Phos  2.8     05-17  Mg     2.1     05-17    ASSESSMENT  49 y/o M POD#6, s/p Minh patch for perforated ulcer, NGT d/c'd, +F, no BM, tolerating clear liquids, episode of afib now in NSR, elevated troponin with peak at 13.      PLAN  - incentive spirometer  - pain control  - care as per ICU team  - cardio recs appreciated  - OOB, ambulate as tolerated  - Advance to full liquid diet tonight. If tolerates, regular diet tomorrow AM  - serial abdominal exams  - labs in AM  - monitor Aguilera output  -Case discussed with Dr. Mejia    Surgical Team Contact Information  Spectralink: Ext: [phone] or [phone]  Pager: [phone]

## 2019-05-17 NOTE — CHART NOTE - NSCHARTNOTEFT_GEN_A_CORE
Assessment: Pt advanced this am to consistent CHO/clear liquids, taking sips at present. +flatus, no BM. Continues on humalog. Transferred bach to ICU with a fib.    Factors impacting intake: [x ] none [ ] nausea  [ ] vomiting [ ] diarrhea [ ] constipation  [ ]chewing problems [ ] swallowing issues  [ ] other:     Diet Presciption: Diet, Clear Liquid:   Consistent Carbohydrate {Evening Snack} (05-17-19 @ 07:30)    Intake: first meal post op/sips    Current Weight: Weight (kg): 81.6 (05-10 @ 21:47)  % Weight Change    Pertinent Medications: MEDICATIONS  (STANDING):  amiodarone Infusion 0.5 mG/Min (16.667 mL/Hr) IV Continuous <Continuous>  chlorhexidine 2% Cloths 1 Application(s) Topical daily  dextrose 5%. 1000 milliLiter(s) (50 mL/Hr) IV Continuous <Continuous>  dextrose 50% Injectable 12.5 Gram(s) IV Push once  dextrose 50% Injectable 25 Gram(s) IV Push once  dextrose 50% Injectable 25 Gram(s) IV Push once  enoxaparin Injectable 80 milliGRAM(s) SubCutaneous two times a day  insulin lispro (HumaLOG) corrective regimen sliding scale   SubCutaneous every 6 hours  lactated ringers. 1000 milliLiter(s) (75 mL/Hr) IV Continuous <Continuous>  nicotine -  14 mG/24Hr(s) Patch 1 patch Transdermal daily  pantoprazole    Tablet 40 milliGRAM(s) Oral two times a day  potassium chloride    Tablet ER 40 milliEquivalent(s) Oral every 4 hours    MEDICATIONS  (PRN):  acetaminophen   Tablet .. 650 milliGRAM(s) Oral every 6 hours PRN Mild Pain (1 - 3)  dextrose 40% Gel 15 Gram(s) Oral once PRN Blood Glucose LESS THAN 70 milliGRAM(s)/deciliter  glucagon  Injectable 1 milliGRAM(s) IntraMuscular once PRN Glucose LESS THAN 70 milligrams/deciliter  HYDROmorphone  Injectable 0.25 milliGRAM(s) IV Push every 4 hours PRN Moderate Pain (4 - 6)  HYDROmorphone  Injectable 0.5 milliGRAM(s) IV Push every 4 hours PRN Severe Pain (7 - 10)    Pertinent Labs: 05-17 Na145 mmol/L Glu 131 mg/dL<H> K+ 3.6 mmol/L Cr  1.20 mg/dL BUN 20 mg/dL 05-17 Phos 2.8 mg/dL 05-13 Alb 2.3 g/dL<L> 05-12 [id number] 8.0 %<H>     CAPILLARY BLOOD GLUCOSE      POCT Blood Glucose.: 137 mg/dL (17 May 2019 05:33)  POCT Blood Glucose.: 123 mg/dL (17 May 2019 00:38)  POCT Blood Glucose.: 128 mg/dL (16 May 2019 18:32)  POCT Blood Glucose.: 148 mg/dL (16 May 2019 12:07)    Skin: no pressure injuries    Estimated Needs:   [x ] no change since previous assessment  [ ] recalculated:     Previous Nutrition Diagnosis:   [ ] Inadequate Energy Intake [ ]Inadequate Oral Intake [ ] Excessive Energy Intake   [ ] Underweight [ ] Increased Nutrient Needs [ ] Overweight/Obesity   [x ] Altered GI Function [ ] Unintended Weight Loss [ ] Food & Nutrition Related Knowledge Deficit [ ] Malnutrition     Nutrition Diagnosis is [x ] ongoing  [ ] resolved [ ] not applicable     New Nutrition Diagnosis: [ ] not applicable       Interventions:   Recommend  [x ] Change Diet To: advance per surgury to soft/consistent CHO/DASH/TLC  [ ] Nutrition Supplement  [ ] Nutrition Support  [x] Other: monitor for BM    Monitoring and Evaluation:   [ ] PO intake [ x ] Tolerance to diet prescription [ x ] weights [ x ] labs[ x ] follow up per protocol  [ ] other:

## 2019-05-17 NOTE — PROGRESS NOTE ADULT - ASSESSMENT
48 year old male with HTN and DM2, who presented with Perforated Antral Gastric Ulcer, POD 5 s/p Emergent Exp-lap Oomentopexy and plication. He had an RRT for atrial fibrillation with rapid ventricular response, which is new. He has had difficult to control periods of tachycardia and bradycardia, currently in a sinus rhythm this morning   He had an elevated troponin possibly suggestive of non-ST segment elevation myocardial infarction/demand ischemia. He has no known history of coronary artery disease      - Cont po amio for now to 5g  - off dilt and digoxin  - echo with normal lv function, moderate mr; will repeat echo with elevated troponin  - cont enoxaparin for now, though af has not recurred. will decide regarding long term ac as we go  - Watch creatinine and electrolytes. Keep K>4, Mg>2  - Will follow with you.  - will need ischemia evaluation in future once stable    Patient at high risk for decompensation given the above comorbidities and active medical issues.  I have personally spent >35 minutes  of critical care time in examining patient, reviewing chart, discussing care/management with patient/family, and ICU team.

## 2019-05-17 NOTE — PROGRESS NOTE ADULT - SUBJECTIVE AND OBJECTIVE BOX
[facility]    Bolivar Carbajal, El, Brittany, Zahra, Reynaldo, Román      [phone]    CHIEF COMPLAINT: Patient is a 48y old  Male who presents with a chief complaint of perforated stomach ulcer (17 May 2019 11:49)      Follow Up: s/p repaired gastric ulcer, paf    Interim history: The patient reports no new symptoms.  Denies chest discomfort and shortness of breath.  No abdominal pain.  No new neurologic symptoms.      MEDICATIONS  (STANDING):  amiodarone    Tablet   Oral   amiodarone    Tablet 400 milliGRAM(s) Oral every 8 hours  chlorhexidine 2% Cloths 1 Application(s) Topical daily  dextrose 5%. 1000 milliLiter(s) (50 mL/Hr) IV Continuous <Continuous>  dextrose 50% Injectable 12.5 Gram(s) IV Push once  dextrose 50% Injectable 25 Gram(s) IV Push once  dextrose 50% Injectable 25 Gram(s) IV Push once  enoxaparin Injectable 80 milliGRAM(s) SubCutaneous two times a day  insulin lispro (HumaLOG) corrective regimen sliding scale   SubCutaneous every 6 hours  nicotine -  14 mG/24Hr(s) Patch 1 patch Transdermal daily  pantoprazole    Tablet 40 milliGRAM(s) Oral two times a day    MEDICATIONS  (PRN):  acetaminophen   Tablet .. 650 milliGRAM(s) Oral every 6 hours PRN Mild Pain (1 - 3)  dextrose 40% Gel 15 Gram(s) Oral once PRN Blood Glucose LESS THAN 70 milliGRAM(s)/deciliter  glucagon  Injectable 1 milliGRAM(s) IntraMuscular once PRN Glucose LESS THAN 70 milligrams/deciliter  HYDROmorphone  Injectable 0.25 milliGRAM(s) IV Push every 4 hours PRN Severe Pain (7 - 10)  oxyCODONE    5 mG/acetaminophen 325 mG 1 Tablet(s) Oral every 6 hours PRN Moderate Pain (4 - 6)      REVIEW OF SYSTEMS:  eye, ent, GI, , allergic, dermatologic, musculoskeletal and neurologic are negative except as described above    Vital Signs Last 24 Hrs  T(C): 37.5 (17 May 2019 12:00), Max: 37.5 (17 May 2019 12:00)  T(F): 99.5 (17 May 2019 12:00), Max: 99.5 (17 May 2019 12:00)  HR: 66 (17 May 2019 14:00) (55 - 80)  BP: 129/69 (17 May 2019 14:00) (129/69 - 173/80)  BP(mean): 92 (17 May 2019 14:00) (92 - 117)  RR: 26 (17 May 2019 14:00) (15 - 30)  SpO2: 93% (17 May 2019 14:00) (90% - 100%)    I&O's Summary    16 May 2019 07:01  -  17 May 2019 07:00  --------------------------------------------------------  IN: 2901.4 mL / OUT: 2370 mL / NET: 531.4 mL    17 May 2019 07:01  -  17 May 2019 14:24  --------------------------------------------------------  IN: 743.4 mL / OUT: 515 mL / NET: 228.4 mL        Telemetry past 24h: sr, 3 beats of prob svt    PHYSICAL EXAM:    Constitutional: well-nourished, well-developed, NAD   HEENT:  MMM, sclerae anicteric, conjunctivae clear, no oral cyanosis.  Pulmonary: Non-labored, breath sounds are clear bilaterally, No wheezing, rales or rhonchi  Cardiovascular: Regular, S1 and S2.  No murmur.  No rubs, gallops or clicks  Gastrointestinal: Bowel Sounds present, soft, nontender.   Lymph: No peripheral edema.   Neurological: Alert, no focal deficits  Skin: No rashes.  Psych:  Mood & affect appropriate    LABS: All Labs Reviewed:                        10.2   10.62 )-----------( 262      ( 17 May 2019 05:05 )             31.0                         10.5   10.70 )-----------( 234      ( 16 May 2019 05:15 )             31.8                         9.9    8.55  )-----------( 223      ( 15 May 2019 22:50 )             30.3     17 May 2019 05:05    145    |  108    |  20     ----------------------------<  131    3.6     |  28     |  1.20   16 May 2019 05:15    144    |  108    |  18     ----------------------------<  143    3.4     |  29     |  1.20   15 May 2019 22:50    144    |  108    |  20     ----------------------------<  138    3.5     |  30     |  1.30     Ca    7.9        17 May 2019 05:05  Ca    8.0        16 May 2019 05:15  Ca    8.2        15 May 2019 22:50  Phos  2.8       17 May 2019 05:05  Phos  2.7       16 May 2019 05:15  Phos  2.3       15 May 2019 22:50  Mg     2.1       17 May 2019 05:05  Mg     1.6       16 May 2019 05:15  Mg     1.8       15 May 2019 22:50        CARDIAC MARKERS ( 17 May 2019 05:05 )  8.110 ng/mL / x     / x     / x     / x      CARDIAC MARKERS ( 16 May 2019 05:15 )  10.900 ng/mL / x     / 148 U/L / x     / 5.1 ng/mL  CARDIAC MARKERS ( 15 May 2019 22:50 )  13.200 ng/mL / x     / 205 U/L / x     / 8.1 ng/mL      Blood Culture: Organism --  Gram Stain Blood -- Gram Stain --  Specimen Source .Urine Catheterized  Culture-Blood --    Organism --  Gram Stain Blood -- Gram Stain   Few polymorphonuclear leukocytes per low power field  Few Squamous epithelial cells per low power field  No organisms seen per oil power field  Specimen Source .Sputum Sputum  Culture-Blood --        05-16 @ 05:15  TSH: 0.81      RADIOLOGY:    EKG:    Echo:

## 2019-05-17 NOTE — PROGRESS NOTE ADULT - ATTENDING COMMENTS
48M PMH HTN, DM2 (not on meds), and diabetic neuropathy who presents with perforated gastric antral ulcer with chemical peritonitis, underwent emergent omentopexy and plication for perforated antral ulcer, POD 6. He also has mild OBI that is improved. Course complicated by new-onset A-Fib with RVR and NSTEMI, now converted to NSR.    1. Neuro: pain control with acetaminophen and hydromorphone prn. PT eval  2. Cardiac: remains in NSR, change amiodarone load to oral. Hold digoxin and diltiazem. S/p Digoxin load 5/15. Continue enoxaparin for NSTEMI/A-Fib. Will eventually require ischemic eval, f/up cards. Remains HD stable, d/c LR if tolerates clears  3. Pulm: atelectasis, continue pain control, incentive spirometry, out of bed to chair, ambulate with assistance. Supplemental oxygen with NC, goal SpO2>90%. Continue nicotine patch for smoking cessation  4. GI: tolerating clears this morning. Advance to full liquids tonight, then regular diet tomorrow if remains stable. JO with serous drainage. Continue oral PPI bid for perforated ulcer   5. Renal: resolved OBI. D/c IVF when tolerates po. D/c jacques and TOV  6. ID: s/p Zosyn for 5 days. No evidence of infection at present. Blood and urine cultures negative. Sputum culture with normal respiratory jakub. F/up podiatry regarding left foot medial hallux diabetic foot ulcer with purulent drainage. X-rays negative. Continue local wound care. Patient declining MRI until abdominal surgical staples removed  7. Endo: continue insulin coverage scale, A1C 8.0  8. Heme: stable H/H, DVT ppx (on lovenox)  9. Dispo: full code, discussed with patient at bedside  10. Stable for transfer to tele

## 2019-05-18 LAB
ANION GAP SERPL CALC-SCNC: 7 MMOL/L — SIGNIFICANT CHANGE UP (ref 5–17)
BUN SERPL-MCNC: 20 MG/DL — SIGNIFICANT CHANGE UP (ref 7–23)
CALCIUM SERPL-MCNC: 7.6 MG/DL — LOW (ref 8.5–10.1)
CHLORIDE SERPL-SCNC: 107 MMOL/L — SIGNIFICANT CHANGE UP (ref 96–108)
CO2 SERPL-SCNC: 29 MMOL/L — SIGNIFICANT CHANGE UP (ref 22–31)
CREAT SERPL-MCNC: 1.3 MG/DL — SIGNIFICANT CHANGE UP (ref 0.5–1.3)
GLUCOSE SERPL-MCNC: 129 MG/DL — HIGH (ref 70–99)
HCT VFR BLD CALC: 30.5 % — LOW (ref 39–50)
HGB BLD-MCNC: 10 G/DL — LOW (ref 13–17)
MAGNESIUM SERPL-MCNC: 1.9 MG/DL — SIGNIFICANT CHANGE UP (ref 1.6–2.6)
MCHC RBC-ENTMCNC: 31 PG — SIGNIFICANT CHANGE UP (ref 27–34)
MCHC RBC-ENTMCNC: 32.8 GM/DL — SIGNIFICANT CHANGE UP (ref 32–36)
MCV RBC AUTO: 94.4 FL — SIGNIFICANT CHANGE UP (ref 80–100)
NRBC # BLD: 0 /100 WBCS — SIGNIFICANT CHANGE UP (ref 0–0)
PHOSPHATE SERPL-MCNC: 2.8 MG/DL — SIGNIFICANT CHANGE UP (ref 2.5–4.5)
PLATELET # BLD AUTO: 280 K/UL — SIGNIFICANT CHANGE UP (ref 150–400)
POTASSIUM SERPL-MCNC: 3.5 MMOL/L — SIGNIFICANT CHANGE UP (ref 3.5–5.3)
POTASSIUM SERPL-SCNC: 3.5 MMOL/L — SIGNIFICANT CHANGE UP (ref 3.5–5.3)
RBC # BLD: 3.23 M/UL — LOW (ref 4.2–5.8)
RBC # FLD: 12 % — SIGNIFICANT CHANGE UP (ref 10.3–14.5)
SODIUM SERPL-SCNC: 143 MMOL/L — SIGNIFICANT CHANGE UP (ref 135–145)
WBC # BLD: 9.45 K/UL — SIGNIFICANT CHANGE UP (ref 3.8–10.5)
WBC # FLD AUTO: 9.45 K/UL — SIGNIFICANT CHANGE UP (ref 3.8–10.5)

## 2019-05-18 PROCEDURE — 99233 SBSQ HOSP IP/OBS HIGH 50: CPT

## 2019-05-18 PROCEDURE — 99232 SBSQ HOSP IP/OBS MODERATE 35: CPT

## 2019-05-18 RX ORDER — POTASSIUM CHLORIDE 20 MEQ
40 PACKET (EA) ORAL ONCE
Refills: 0 | Status: COMPLETED | OUTPATIENT
Start: 2019-05-18 | End: 2019-05-18

## 2019-05-18 RX ORDER — LANOLIN ALCOHOL/MO/W.PET/CERES
5 CREAM (GRAM) TOPICAL ONCE
Refills: 0 | Status: COMPLETED | OUTPATIENT
Start: 2019-05-18 | End: 2019-05-18

## 2019-05-18 RX ORDER — MAGNESIUM SULFATE 500 MG/ML
1 VIAL (ML) INJECTION ONCE
Refills: 0 | Status: COMPLETED | OUTPATIENT
Start: 2019-05-18 | End: 2019-05-18

## 2019-05-18 RX ORDER — INSULIN LISPRO 100/ML
VIAL (ML) SUBCUTANEOUS
Refills: 0 | Status: DISCONTINUED | OUTPATIENT
Start: 2019-05-18 | End: 2019-05-28

## 2019-05-18 RX ORDER — OXYCODONE HYDROCHLORIDE 5 MG/1
5 TABLET ORAL EVERY 6 HOURS
Refills: 0 | Status: DISCONTINUED | OUTPATIENT
Start: 2019-05-18 | End: 2019-05-25

## 2019-05-18 RX ADMIN — AMIODARONE HYDROCHLORIDE 400 MILLIGRAM(S): 400 TABLET ORAL at 14:03

## 2019-05-18 RX ADMIN — AMIODARONE HYDROCHLORIDE 400 MILLIGRAM(S): 400 TABLET ORAL at 05:48

## 2019-05-18 RX ADMIN — Medication 4: at 12:16

## 2019-05-18 RX ADMIN — PANTOPRAZOLE SODIUM 40 MILLIGRAM(S): 20 TABLET, DELAYED RELEASE ORAL at 05:48

## 2019-05-18 RX ADMIN — CHLORHEXIDINE GLUCONATE 1 APPLICATION(S): 213 SOLUTION TOPICAL at 12:16

## 2019-05-18 RX ADMIN — PANTOPRAZOLE SODIUM 40 MILLIGRAM(S): 20 TABLET, DELAYED RELEASE ORAL at 17:19

## 2019-05-18 RX ADMIN — Medication 40 MILLIEQUIVALENT(S): at 08:38

## 2019-05-18 RX ADMIN — TAMSULOSIN HYDROCHLORIDE 0.4 MILLIGRAM(S): 0.4 CAPSULE ORAL at 21:04

## 2019-05-18 RX ADMIN — Medication 100 GRAM(S): at 12:15

## 2019-05-18 RX ADMIN — Medication 5 MILLIGRAM(S): at 21:50

## 2019-05-18 RX ADMIN — Medication 4: at 21:14

## 2019-05-18 RX ADMIN — ENOXAPARIN SODIUM 80 MILLIGRAM(S): 100 INJECTION SUBCUTANEOUS at 14:03

## 2019-05-18 RX ADMIN — Medication 1 PATCH: at 12:15

## 2019-05-18 RX ADMIN — AMIODARONE HYDROCHLORIDE 400 MILLIGRAM(S): 400 TABLET ORAL at 21:04

## 2019-05-18 RX ADMIN — Medication 1 PATCH: at 12:30

## 2019-05-18 RX ADMIN — HYDROMORPHONE HYDROCHLORIDE 0.25 MILLIGRAM(S): 2 INJECTION INTRAMUSCULAR; INTRAVENOUS; SUBCUTANEOUS at 03:56

## 2019-05-18 RX ADMIN — Medication 4: at 17:19

## 2019-05-18 RX ADMIN — Medication 1 PATCH: at 08:16

## 2019-05-18 RX ADMIN — HYDROMORPHONE HYDROCHLORIDE 0.25 MILLIGRAM(S): 2 INJECTION INTRAMUSCULAR; INTRAVENOUS; SUBCUTANEOUS at 23:52

## 2019-05-18 RX ADMIN — Medication 40 MILLIEQUIVALENT(S): at 12:15

## 2019-05-18 RX ADMIN — HYDROMORPHONE HYDROCHLORIDE 0.25 MILLIGRAM(S): 2 INJECTION INTRAMUSCULAR; INTRAVENOUS; SUBCUTANEOUS at 04:15

## 2019-05-18 RX ADMIN — ENOXAPARIN SODIUM 80 MILLIGRAM(S): 100 INJECTION SUBCUTANEOUS at 03:00

## 2019-05-18 NOTE — PROGRESS NOTE ADULT - SUBJECTIVE AND OBJECTIVE BOX
Afeb VSS  Aguilera inserted for urinary retention  Tolerated full liquids.  To start regular diet today  Passing flatus, no BM yet  Abd: nontender, nondistended, wound healing well  P: d/c JO ddrain; urology consult Monday

## 2019-05-18 NOTE — PROGRESS NOTE ADULT - ASSESSMENT
48 year old male with HTN and DM2, who presented with Perforated Antral Gastric Ulcer, POD 5 s/p Emergent Exp-lap Oomentopexy and plication. He had an RRT for atrial fibrillation with rapid ventricular response, which is new. He has had difficult to control periods of tachycardia and bradycardia, currently in a sinus rhythm this morning   He had an elevated troponin possibly suggestive of non-ST segment elevation myocardial infarction/demand ischemia. He has no known history of coronary artery disease      - Cont po amio for now to 5g  - off dilt and digoxin  - echo with normal lv function, moderate mr   - cont enoxaparin for now, though af has not recurred. will decide regarding long term ac as we go.  Given that things have been quiet more recently, it seems reasonable to avoid longer term ac   - Watch creatinine and electrolytes. Keep K>4, Mg>2  - Will follow with you.  - will need ischemia evaluation in future once stable  -up for tx to surgical floor

## 2019-05-18 NOTE — PROGRESS NOTE ADULT - ASSESSMENT
48M PMH HTN, DM2 (not on meds), and diabetic neuropathy who presents with perforated gastric antral ulcer with chemical peritonitis, underwent emergent omentopexy and plication for perforated antral ulcer, POD 6. He also has mild OBI that is improved. Course complicated by new-onset A-Fib with RVR and NSTEMI, now converted to NSR.    1. Neuro: pain control with acetaminophen, oxycodone, and hydromorphone prn. PT eval, out of bed to chair  2. Cardiac: remains in NSR, continue oral amiodarone load. S/p digoxin load on 5/15, no further digoxin at this time. Run of NSVT overnight. Keep K>4, Mg>2. Continue enoxaparin for NSTEMI/A-Fib. Will eventually require ischemic eval, f/up cards. Remains HD stable  3. Pulm: atelectasis, continue pain control, incentive spirometry, out of bed to chair, ambulate with assistance. Supplemental oxygen with NC, goal SpO2>90%. Continue nicotine patch for smoking cessation  4. GI: advance diet to regular (diabetic, dash/tlc). JO removed this morning by surgery. Continue oral PPI bid for perforated ulcer. Surgical site c/d/i  5. Renal: resolved OBI. Replete K/Mg. Failed TOV, jacques replaced, will need urology evaluation prior to discharge for outpatient follow-up. Continue tamsulosin  6. ID: s/p Zosyn for 5 days. No evidence of infection at present. Observe off abx. Blood and urine cultures negative. Sputum culture with normal respiratory jakub. F/up podiatry regarding left foot medial hallux diabetic foot ulcer. X-rays negative. Continue local wound care. Patient declining MRI until abdominal surgical staples removed  7. Endo: continue insulin coverage scale, A1C 8.0  8. Heme: stable H/H, DVT ppx (on lovenox)  9. Dispo: full code, discussed with patient at bedside  10. Stable for transfer to tele

## 2019-05-18 NOTE — PROGRESS NOTE ADULT - SUBJECTIVE AND OBJECTIVE BOX
Interval events: no acute events overnight. Developed urinary retention yesterday evening with >1 liter, jacques catheter inserted. Diet advanced to regular this morning    Review of Systems:  Constitutional: no fever, chills, fatigue  Neuro: no headache, numbness, weakness  Resp: no cough, wheezing, shortness of breath  CVS: no chest pain, palpitations, leg swelling  GI: no abdominal pain, nausea, vomiting, diarrhea   : no dysuria, frequency, incontinence  Skin: no itching, burning, rashes, or lesions   Msk: no joint pain or swelling  Psych: no depression, anxiety    T(F): 98 (05-18-19 @ 07:19), Max: 99.5 (05-17-19 @ 12:00)  HR: 60 (05-18-19 @ 07:00) (55 - 80)  BP: 119/63 (05-18-19 @ 07:00) (119/63 - 173/80)  RR: 22 (05-18-19 @ 07:00) (19 - 31)  SpO2: 89% (05-18-19 @ 07:00) (89% - 96%)    POCT Blood Glucose.: 134 mg/dL (18 May 2019 05:27)    I&O's Summary    17 May 2019 07:01  -  18 May 2019 07:00  --------------------------------------------------------  IN: 2063.4 mL / OUT: 3205 mL / NET: -1141.6 mL    Physical Exam:     Gen:  Neuro:  HEENT:  CV:  Pulm:  GI:  Ext:  Skin:    Meds:  enoxaparin Injectable 80 milliGRAM(s) SubCutaneous two times a day  amiodarone    Tablet 400 milliGRAM(s) Oral every 8 hours  tamsulosin 0.4 milliGRAM(s) Oral at bedtime  insulin lispro (HumaLOG) corrective regimen sliding scale   SubCutaneous every 6 hours  acetaminophen   Tablet .. 650 milliGRAM(s) Oral every 6 hours PRN  HYDROmorphone  Injectable 0.25 milliGRAM(s) IV Push every 4 hours PRN  oxyCODONE    5 mG/acetaminophen 325 mG 1 Tablet(s) Oral every 6 hours PRN  pantoprazole    Tablet 40 milliGRAM(s) Oral two times a day  dextrose 5%. 1000 milliLiter(s) IV Continuous <Continuous>  potassium chloride   Powder 40 milliEquivalent(s) Oral once  chlorhexidine 2% Cloths 1 Application(s) Topical daily  nicotine -  14 mG/24Hr(s) Patch 1 patch Transdermal daily                          10.0   9.45  )-----------( 280      ( 18 May 2019 05:56 )             30.5       05-18    143  |  107  |  20  ----------------------------<  129<H>  3.5   |  29  |  1.30    Ca    7.6<L>      18 May 2019 05:56  Phos  2.8     05-18  Mg     1.9     05-18        CARDIAC MARKERS ( 17 May 2019 05:05 )  8.110 ng/mL / x     / x     / x     / x              .Urine Catheterized   No growth -- 05-13 @ 09:26      CENTRAL LINE: N    MICHAEL: Y     DATE INSERTED: 5/17       REMOVE: N    A-LINE: N    GLOBAL ISSUE/BEST PRACTICE:  Analgesia: acetaminophen/percocet/hydromorphone  Sedation: n/a  CAM-ICU: N  HOB elevation: yes  Stress ulcer prophylaxis: PPI  VTE prophylaxis: enoxaparin  Glycemic control: yes  Nutrition: regular    CODE STATUS: full Interval events: no acute events overnight. Developed urinary retention yesterday evening with >1 liter, jacques catheter inserted. Diet advanced to regular this morning    Review of Systems:  Constitutional: no fever, chills, fatigue  Neuro: no headache, numbness, weakness  Resp: no cough, wheezing, shortness of breath  CVS: no chest pain, palpitations, leg swelling  GI: no abdominal pain, nausea, vomiting, diarrhea   : no dysuria, frequency, incontinence  Skin: no itching, burning, rashes, or lesions   Msk: no joint pain or swelling  Psych: no depression, anxiety    T(F): 98 (05-18-19 @ 07:19), Max: 99.5 (05-17-19 @ 12:00)  HR: 60 (05-18-19 @ 07:00) (55 - 80)  BP: 119/63 (05-18-19 @ 07:00) (119/63 - 173/80)  RR: 22 (05-18-19 @ 07:00) (19 - 31)  SpO2: 89% (05-18-19 @ 07:00) (89% - 96%)    POCT Blood Glucose.: 134 mg/dL (18 May 2019 05:27)    I&O's Summary    17 May 2019 07:01  -  18 May 2019 07:00  --------------------------------------------------------  IN: 2063.4 mL / OUT: 3205 mL / NET: -1141.6 mL    Physical Exam:     Gen: NAD; AAOx3  Neuro: CN II-XII grossly intact; moving all extremities   HEENT: NC/AT; EOMI; MMM  CV: normal S1 & S2; regular rate and rhythm   Pulm: clear to auscultation bilaterally   GI: soft; NT/ND; surgical staples intact with wounds c/d/i; normal BS  Ext: no edema; pulses intact  Skin: warm, well perfused; left medial hallux ulcer    Meds:  enoxaparin Injectable 80 milliGRAM(s) SubCutaneous two times a day  amiodarone    Tablet 400 milliGRAM(s) Oral every 8 hours  tamsulosin 0.4 milliGRAM(s) Oral at bedtime  insulin lispro (HumaLOG) corrective regimen sliding scale   SubCutaneous every 6 hours  acetaminophen   Tablet .. 650 milliGRAM(s) Oral every 6 hours PRN  HYDROmorphone  Injectable 0.25 milliGRAM(s) IV Push every 4 hours PRN  oxyCODONE    5 mG/acetaminophen 325 mG 1 Tablet(s) Oral every 6 hours PRN  pantoprazole    Tablet 40 milliGRAM(s) Oral two times a day  dextrose 5%. 1000 milliLiter(s) IV Continuous <Continuous>  potassium chloride   Powder 40 milliEquivalent(s) Oral once  chlorhexidine 2% Cloths 1 Application(s) Topical daily  nicotine -  14 mG/24Hr(s) Patch 1 patch Transdermal daily                          10.0   9.45  )-----------( 280      ( 18 May 2019 05:56 )             30.5       05-18    143  |  107  |  20  ----------------------------<  129<H>  3.5   |  29  |  1.30    Ca    7.6<L>      18 May 2019 05:56  Phos  2.8     05-18  Mg     1.9     05-18        CARDIAC MARKERS ( 17 May 2019 05:05 )  8.110 ng/mL / x     / x     / x     / x              .Urine Catheterized   No growth -- 05-13 @ 09:26      CENTRAL LINE: CANELO JACQUES: Y     DATE INSERTED: 5/17       REMOVE: N    A-LINE: N    GLOBAL ISSUE/BEST PRACTICE:  Analgesia: acetaminophen/percocet/hydromorphone  Sedation: n/a  CAM-ICU: N  HOB elevation: yes  Stress ulcer prophylaxis: PPI  VTE prophylaxis: enoxaparin  Glycemic control: yes  Nutrition: regular    CODE STATUS: full

## 2019-05-18 NOTE — PROGRESS NOTE ADULT - SUBJECTIVE AND OBJECTIVE BOX
[facility]    Bolivar Carbajal, El, Brittany, Zahra, Reynaldo, Román      [phone]    CHIEF COMPLAINT: Patient is a 48y old  Male who presents with a chief complaint of perforated stomach ulcer (18 May 2019 08:03)      Follow Up: s/p gastric surgery for perf ulcer, paf    Interim history: The patient reports no new symptoms.  Denies chest discomfort and shortness of breath.  Improving abdominal pain.  No new neurologic symptoms.      MEDICATIONS  (STANDING):  amiodarone    Tablet   Oral   amiodarone    Tablet 400 milliGRAM(s) Oral every 8 hours  chlorhexidine 2% Cloths 1 Application(s) Topical daily  dextrose 5%. 1000 milliLiter(s) (50 mL/Hr) IV Continuous <Continuous>  dextrose 50% Injectable 12.5 Gram(s) IV Push once  dextrose 50% Injectable 25 Gram(s) IV Push once  dextrose 50% Injectable 25 Gram(s) IV Push once  enoxaparin Injectable 80 milliGRAM(s) SubCutaneous two times a day  insulin lispro (HumaLOG) corrective regimen sliding scale   SubCutaneous every 6 hours  nicotine -  14 mG/24Hr(s) Patch 1 patch Transdermal daily  pantoprazole    Tablet 40 milliGRAM(s) Oral two times a day  tamsulosin 0.4 milliGRAM(s) Oral at bedtime    MEDICATIONS  (PRN):  acetaminophen   Tablet .. 650 milliGRAM(s) Oral every 6 hours PRN Mild Pain (1 - 3)  dextrose 40% Gel 15 Gram(s) Oral once PRN Blood Glucose LESS THAN 70 milliGRAM(s)/deciliter  glucagon  Injectable 1 milliGRAM(s) IntraMuscular once PRN Glucose LESS THAN 70 milligrams/deciliter  HYDROmorphone  Injectable 0.25 milliGRAM(s) IV Push every 4 hours PRN Severe Pain (7 - 10)  oxyCODONE    5 mG/acetaminophen 325 mG 1 Tablet(s) Oral every 6 hours PRN Moderate Pain (4 - 6)      REVIEW OF SYSTEMS:  eye, ent, GI, , allergic, dermatologic, musculoskeletal and neurologic are negative except as described above    Vital Signs Last 24 Hrs  T(C): 36.7 (18 May 2019 07:19), Max: 37.5 (17 May 2019 12:00)  T(F): 98 (18 May 2019 07:19), Max: 99.5 (17 May 2019 12:00)  HR: 61 (18 May 2019 09:00) (55 - 74)  BP: 126/64 (18 May 2019 09:00) (119/63 - 167/79)  BP(mean): 89 (18 May 2019 09:00) (86 - 113)  RR: 22 (18 May 2019 09:00) (19 - 31)  SpO2: 90% (18 May 2019 09:00) (89% - 96%)    I&O's Summary    17 May 2019 07:01  -  18 May 2019 07:00  --------------------------------------------------------  IN: 2063.4 mL / OUT: 3205 mL / NET: -1141.6 mL    18 May 2019 07:01  -  18 May 2019 10:01  --------------------------------------------------------  IN: 0 mL / OUT: 225 mL / NET: -225 mL        Telemetry past 24h:    PHYSICAL EXAM:    Constitutional: well-nourished, well-developed, NAD   HEENT:  MMM, sclerae anicteric, conjunctivae clear, no oral cyanosis.  Pulmonary: Non-labored, breath sounds are clear bilaterally, No wheezing, rales or rhonchi  Cardiovascular: Regular, S1 and S2.  No murmur.  No rubs, gallops or clicks  Gastrointestinal: Bowel Sounds present, soft, mildly tender.   Lymph: No peripheral edema.   Neurological: Alert, no focal deficits  Skin: No rashes.  Psych:  Mood & affect appropriate    LABS: All Labs Reviewed:                        10.0   9.45  )-----------( 280      ( 18 May 2019 05:56 )             30.5                         10.2   10.62 )-----------( 262      ( 17 May 2019 05:05 )             31.0                         10.5   10.70 )-----------( 234      ( 16 May 2019 05:15 )             31.8     18 May 2019 05:56    143    |  107    |  20     ----------------------------<  129    3.5     |  29     |  1.30   17 May 2019 05:05    145    |  108    |  20     ----------------------------<  131    3.6     |  28     |  1.20   16 May 2019 05:15    144    |  108    |  18     ----------------------------<  143    3.4     |  29     |  1.20     Ca    7.6        18 May 2019 05:56  Ca    7.9        17 May 2019 05:05  Ca    8.0        16 May 2019 05:15  Phos  2.8       18 May 2019 05:56  Phos  2.8       17 May 2019 05:05  Phos  2.7       16 May 2019 05:15  Mg     1.9       18 May 2019 05:56  Mg     2.1       17 May 2019 05:05  Mg     1.6       16 May 2019 05:15        CARDIAC MARKERS ( 17 May 2019 05:05 )  8.110 ng/mL / x     / x     / x     / x          Blood Culture:     05-16 @ 05:15  TSH: 0.81      RADIOLOGY:    EKG:    Echo:

## 2019-05-19 LAB
ANION GAP SERPL CALC-SCNC: 8 MMOL/L — SIGNIFICANT CHANGE UP (ref 5–17)
BASOPHILS # BLD AUTO: 0.03 K/UL — SIGNIFICANT CHANGE UP (ref 0–0.2)
BASOPHILS NFR BLD AUTO: 0.3 % — SIGNIFICANT CHANGE UP (ref 0–2)
BUN SERPL-MCNC: 21 MG/DL — SIGNIFICANT CHANGE UP (ref 7–23)
CALCIUM SERPL-MCNC: 8.1 MG/DL — LOW (ref 8.5–10.1)
CHLORIDE SERPL-SCNC: 108 MMOL/L — SIGNIFICANT CHANGE UP (ref 96–108)
CO2 SERPL-SCNC: 26 MMOL/L — SIGNIFICANT CHANGE UP (ref 22–31)
CREAT SERPL-MCNC: 1.3 MG/DL — SIGNIFICANT CHANGE UP (ref 0.5–1.3)
EOSINOPHIL # BLD AUTO: 0.31 K/UL — SIGNIFICANT CHANGE UP (ref 0–0.5)
EOSINOPHIL NFR BLD AUTO: 3.4 % — SIGNIFICANT CHANGE UP (ref 0–6)
GLUCOSE SERPL-MCNC: 170 MG/DL — HIGH (ref 70–99)
HCT VFR BLD CALC: 30.4 % — LOW (ref 39–50)
HGB BLD-MCNC: 10.1 G/DL — LOW (ref 13–17)
IMM GRANULOCYTES NFR BLD AUTO: 1.9 % — HIGH (ref 0–1.5)
LYMPHOCYTES # BLD AUTO: 1.32 K/UL — SIGNIFICANT CHANGE UP (ref 1–3.3)
LYMPHOCYTES # BLD AUTO: 14.3 % — SIGNIFICANT CHANGE UP (ref 13–44)
MAGNESIUM SERPL-MCNC: 1.8 MG/DL — SIGNIFICANT CHANGE UP (ref 1.6–2.6)
MCHC RBC-ENTMCNC: 31.2 PG — SIGNIFICANT CHANGE UP (ref 27–34)
MCHC RBC-ENTMCNC: 33.2 GM/DL — SIGNIFICANT CHANGE UP (ref 32–36)
MCV RBC AUTO: 93.8 FL — SIGNIFICANT CHANGE UP (ref 80–100)
MONOCYTES # BLD AUTO: 0.88 K/UL — SIGNIFICANT CHANGE UP (ref 0–0.9)
MONOCYTES NFR BLD AUTO: 9.5 % — SIGNIFICANT CHANGE UP (ref 2–14)
NEUTROPHILS # BLD AUTO: 6.52 K/UL — SIGNIFICANT CHANGE UP (ref 1.8–7.4)
NEUTROPHILS NFR BLD AUTO: 70.6 % — SIGNIFICANT CHANGE UP (ref 43–77)
NRBC # BLD: 0 /100 WBCS — SIGNIFICANT CHANGE UP (ref 0–0)
PHOSPHATE SERPL-MCNC: 2.9 MG/DL — SIGNIFICANT CHANGE UP (ref 2.5–4.5)
PLATELET # BLD AUTO: 288 K/UL — SIGNIFICANT CHANGE UP (ref 150–400)
POTASSIUM SERPL-MCNC: 3.9 MMOL/L — SIGNIFICANT CHANGE UP (ref 3.5–5.3)
POTASSIUM SERPL-SCNC: 3.9 MMOL/L — SIGNIFICANT CHANGE UP (ref 3.5–5.3)
RBC # BLD: 3.24 M/UL — LOW (ref 4.2–5.8)
RBC # FLD: 12.2 % — SIGNIFICANT CHANGE UP (ref 10.3–14.5)
SODIUM SERPL-SCNC: 142 MMOL/L — SIGNIFICANT CHANGE UP (ref 135–145)
WBC # BLD: 9.24 K/UL — SIGNIFICANT CHANGE UP (ref 3.8–10.5)
WBC # FLD AUTO: 9.24 K/UL — SIGNIFICANT CHANGE UP (ref 3.8–10.5)

## 2019-05-19 PROCEDURE — 99232 SBSQ HOSP IP/OBS MODERATE 35: CPT

## 2019-05-19 RX ADMIN — Medication 2: at 08:34

## 2019-05-19 RX ADMIN — Medication 1 PATCH: at 19:45

## 2019-05-19 RX ADMIN — Medication 4: at 12:19

## 2019-05-19 RX ADMIN — TAMSULOSIN HYDROCHLORIDE 0.4 MILLIGRAM(S): 0.4 CAPSULE ORAL at 21:09

## 2019-05-19 RX ADMIN — ENOXAPARIN SODIUM 80 MILLIGRAM(S): 100 INJECTION SUBCUTANEOUS at 17:21

## 2019-05-19 RX ADMIN — Medication 6: at 21:09

## 2019-05-19 RX ADMIN — PANTOPRAZOLE SODIUM 40 MILLIGRAM(S): 20 TABLET, DELAYED RELEASE ORAL at 05:12

## 2019-05-19 RX ADMIN — Medication 2: at 17:21

## 2019-05-19 RX ADMIN — PANTOPRAZOLE SODIUM 40 MILLIGRAM(S): 20 TABLET, DELAYED RELEASE ORAL at 17:21

## 2019-05-19 RX ADMIN — HYDROMORPHONE HYDROCHLORIDE 0.25 MILLIGRAM(S): 2 INJECTION INTRAMUSCULAR; INTRAVENOUS; SUBCUTANEOUS at 00:42

## 2019-05-19 RX ADMIN — HYDROMORPHONE HYDROCHLORIDE 0.25 MILLIGRAM(S): 2 INJECTION INTRAMUSCULAR; INTRAVENOUS; SUBCUTANEOUS at 22:48

## 2019-05-19 RX ADMIN — CHLORHEXIDINE GLUCONATE 1 APPLICATION(S): 213 SOLUTION TOPICAL at 11:30

## 2019-05-19 RX ADMIN — HYDROMORPHONE HYDROCHLORIDE 0.25 MILLIGRAM(S): 2 INJECTION INTRAMUSCULAR; INTRAVENOUS; SUBCUTANEOUS at 23:01

## 2019-05-19 RX ADMIN — AMIODARONE HYDROCHLORIDE 400 MILLIGRAM(S): 400 TABLET ORAL at 13:30

## 2019-05-19 RX ADMIN — Medication 1 PATCH: at 12:16

## 2019-05-19 RX ADMIN — ENOXAPARIN SODIUM 80 MILLIGRAM(S): 100 INJECTION SUBCUTANEOUS at 04:28

## 2019-05-19 RX ADMIN — Medication 1 PATCH: at 11:29

## 2019-05-19 NOTE — PROGRESS NOTE ADULT - ASSESSMENT
48 year old male with HTN and DM2, who presented with Perforated Antral Gastric Ulcer, POD 5 s/p Emergent Exp-lap Oomentopexy and plication. He had an RRT for atrial fibrillation with rapid ventricular response, which is new. He has had difficult to control periods of tachycardia and bradycardia, currently in a sinus rhythm this morning   He had an elevated troponin possibly suggestive of non-ST segment elevation myocardial infarction/demand ischemia. He has no known history of coronary artery disease      - Cont po amio for now to 5g  - off dilt and digoxin  - echo with normal lv function, moderate mr   - cont enoxaparin for now, though af has not recurred. will decide regarding long term ac as we go.  Given that things have been quiet more recently, it seems reasonable to avoid longer term ac   - Watch creatinine and electrolytes. Keep K>4, Mg>2  - Will follow with you.  - will need ischemia evaluation in future once stable

## 2019-05-19 NOTE — PROGRESS NOTE ADULT - SUBJECTIVE AND OBJECTIVE BOX
Afeb VSS  No pain. passing large amount flatus. Tolerating regular diet.  No BM yet  Abd: nontender, nondistended, staples removed, wound healing well, areas which were left open and packed now closed  P: discharge planning after he moves bowels

## 2019-05-19 NOTE — PROGRESS NOTE ADULT - SUBJECTIVE AND OBJECTIVE BOX
[facility]    Bolivar Carbajal, El, Brtitany, Zahra, Reynaldo, Román      [phone]    CHIEF COMPLAINT: Patient is a 48y old  Male who presents with a chief complaint of perforated stomach ulcer (19 May 2019 12:30)      Follow Up: paf post op    Interim history: The patient reports no new symptoms.  Denies chest discomfort and shortness of breath.  Less abdominal pain.  No new neurologic symptoms.      MEDICATIONS  (STANDING):  amiodarone    Tablet   Oral   amiodarone    Tablet 200 milliGRAM(s) Oral daily  chlorhexidine 2% Cloths 1 Application(s) Topical daily  dextrose 5%. 1000 milliLiter(s) (50 mL/Hr) IV Continuous <Continuous>  dextrose 50% Injectable 12.5 Gram(s) IV Push once  dextrose 50% Injectable 25 Gram(s) IV Push once  dextrose 50% Injectable 25 Gram(s) IV Push once  enoxaparin Injectable 80 milliGRAM(s) SubCutaneous two times a day  insulin lispro (HumaLOG) corrective regimen sliding scale   SubCutaneous Before meals and at bedtime  nicotine -  14 mG/24Hr(s) Patch 1 patch Transdermal daily  pantoprazole    Tablet 40 milliGRAM(s) Oral two times a day  tamsulosin 0.4 milliGRAM(s) Oral at bedtime    MEDICATIONS  (PRN):  acetaminophen   Tablet .. 650 milliGRAM(s) Oral every 6 hours PRN Mild Pain (1 - 3)  dextrose 40% Gel 15 Gram(s) Oral once PRN Blood Glucose LESS THAN 70 milliGRAM(s)/deciliter  glucagon  Injectable 1 milliGRAM(s) IntraMuscular once PRN Glucose LESS THAN 70 milligrams/deciliter  HYDROmorphone  Injectable 0.25 milliGRAM(s) IV Push every 4 hours PRN Severe Pain (7 - 10)  oxyCODONE    IR 5 milliGRAM(s) Oral every 6 hours PRN Moderate Pain (4 - 6)      REVIEW OF SYSTEMS:  eye, ent, GI, , allergic, dermatologic, musculoskeletal and neurologic are negative except as described above    Vital Signs Last 24 Hrs  T(C): 36.4 (19 May 2019 11:30), Max: 37.6 (18 May 2019 21:00)  T(F): 97.5 (19 May 2019 11:30), Max: 99.6 (18 May 2019 21:00)  HR: 58 (19 May 2019 11:30) (58 - 69)  BP: 144/80 (19 May 2019 11:30) (122/70 - 149/78)  BP(mean): 91 (18 May 2019 16:00) (91 - 92)  RR: 18 (19 May 2019 11:30) (17 - 18)  SpO2: 95% (19 May 2019 11:30) (93% - 97%)    I&O's Summary    18 May 2019 07:01  -  19 May 2019 07:00  --------------------------------------------------------  IN: 100 mL / OUT: 2690 mL / NET: -2590 mL        Telemetry past 24h: sr/sb    PHYSICAL EXAM:    Constitutional: well-nourished, well-developed, NAD   HEENT:  MMM, sclerae anicteric, conjunctivae clear, no oral cyanosis.  Pulmonary: Non-labored, breath sounds are clear bilaterally, No wheezing, rales or rhonchi  Cardiovascular: Regular, S1 and S2.  No murmur.  No rubs, gallops or clicks  Gastrointestinal: Bowel Sounds present, soft, nontender.   Lymph: No peripheral edema.   Neurological: Alert, no focal deficits  Skin: No rashes.  Psych:  Mood & affect appropriate    LABS: All Labs Reviewed:                        10.1   9.24  )-----------( 288      ( 19 May 2019 08:14 )             30.4                         10.0   9.45  )-----------( 280      ( 18 May 2019 05:56 )             30.5                         10.2   10.62 )-----------( 262      ( 17 May 2019 05:05 )             31.0     19 May 2019 08:14    142    |  108    |  21     ----------------------------<  170    3.9     |  26     |  1.30   18 May 2019 05:56    143    |  107    |  20     ----------------------------<  129    3.5     |  29     |  1.30   17 May 2019 05:05    145    |  108    |  20     ----------------------------<  131    3.6     |  28     |  1.20     Ca    8.1        19 May 2019 08:14  Ca    7.6        18 May 2019 05:56  Ca    7.9        17 May 2019 05:05  Phos  2.9       19 May 2019 08:14  Phos  2.8       18 May 2019 05:56  Phos  2.8       17 May 2019 05:05  Mg     1.8       19 May 2019 08:14  Mg     1.9       18 May 2019 05:56  Mg     2.1       17 May 2019 05:05            Blood Culture:         RADIOLOGY:    EKG:    Echo:

## 2019-05-20 ENCOUNTER — TRANSCRIPTION ENCOUNTER (OUTPATIENT)
Age: 49
End: 2019-05-20

## 2019-05-20 DIAGNOSIS — I48.91 UNSPECIFIED ATRIAL FIBRILLATION: ICD-10-CM

## 2019-05-20 DIAGNOSIS — E11.9 TYPE 2 DIABETES MELLITUS WITHOUT COMPLICATIONS: ICD-10-CM

## 2019-05-20 PROCEDURE — 99232 SBSQ HOSP IP/OBS MODERATE 35: CPT

## 2019-05-20 RX ORDER — TAMSULOSIN HYDROCHLORIDE 0.4 MG/1
0.8 CAPSULE ORAL AT BEDTIME
Refills: 0 | Status: DISCONTINUED | OUTPATIENT
Start: 2019-05-20 | End: 2019-05-28

## 2019-05-20 RX ORDER — NICOTINE POLACRILEX 2 MG
1 GUM BUCCAL
Qty: 14 | Refills: 0
Start: 2019-05-20 | End: 2019-06-02

## 2019-05-20 RX ORDER — TAMSULOSIN HYDROCHLORIDE 0.4 MG/1
1 CAPSULE ORAL
Qty: 30 | Refills: 0
Start: 2019-05-20

## 2019-05-20 RX ORDER — PANTOPRAZOLE SODIUM 20 MG/1
1 TABLET, DELAYED RELEASE ORAL
Qty: 60 | Refills: 0
Start: 2019-05-20 | End: 2019-06-18

## 2019-05-20 RX ADMIN — PANTOPRAZOLE SODIUM 40 MILLIGRAM(S): 20 TABLET, DELAYED RELEASE ORAL at 17:04

## 2019-05-20 RX ADMIN — ENOXAPARIN SODIUM 80 MILLIGRAM(S): 100 INJECTION SUBCUTANEOUS at 17:04

## 2019-05-20 RX ADMIN — Medication 6: at 21:56

## 2019-05-20 RX ADMIN — Medication 2: at 07:55

## 2019-05-20 RX ADMIN — HYDROMORPHONE HYDROCHLORIDE 0.25 MILLIGRAM(S): 2 INJECTION INTRAMUSCULAR; INTRAVENOUS; SUBCUTANEOUS at 23:05

## 2019-05-20 RX ADMIN — AMIODARONE HYDROCHLORIDE 200 MILLIGRAM(S): 400 TABLET ORAL at 05:35

## 2019-05-20 RX ADMIN — ENOXAPARIN SODIUM 80 MILLIGRAM(S): 100 INJECTION SUBCUTANEOUS at 05:34

## 2019-05-20 RX ADMIN — PANTOPRAZOLE SODIUM 40 MILLIGRAM(S): 20 TABLET, DELAYED RELEASE ORAL at 05:34

## 2019-05-20 RX ADMIN — Medication 2: at 12:04

## 2019-05-20 RX ADMIN — Medication 4: at 17:04

## 2019-05-20 RX ADMIN — HYDROMORPHONE HYDROCHLORIDE 0.25 MILLIGRAM(S): 2 INJECTION INTRAMUSCULAR; INTRAVENOUS; SUBCUTANEOUS at 15:31

## 2019-05-20 RX ADMIN — Medication 1 PATCH: at 11:29

## 2019-05-20 RX ADMIN — TAMSULOSIN HYDROCHLORIDE 0.8 MILLIGRAM(S): 0.4 CAPSULE ORAL at 21:06

## 2019-05-20 RX ADMIN — HYDROMORPHONE HYDROCHLORIDE 0.25 MILLIGRAM(S): 2 INJECTION INTRAMUSCULAR; INTRAVENOUS; SUBCUTANEOUS at 15:46

## 2019-05-20 RX ADMIN — CHLORHEXIDINE GLUCONATE 1 APPLICATION(S): 213 SOLUTION TOPICAL at 11:29

## 2019-05-20 RX ADMIN — Medication 1 PATCH: at 07:55

## 2019-05-20 RX ADMIN — Medication 1 PATCH: at 11:28

## 2019-05-20 RX ADMIN — Medication 1 PATCH: at 17:04

## 2019-05-20 NOTE — ADVANCED PRACTICE NURSE CONSULT - RECOMMEDATIONS
Type 2 A1c 8 % s/p   Recommend endocrine consult  FU appt: needs  Diabetes wellness program accepted-referral made  Diabetes support group info given  Goal 100-180 mg/dL

## 2019-05-20 NOTE — PROGRESS NOTE ADULT - SUBJECTIVE AND OBJECTIVE BOX
Patient is a 48y old  Male who presents with a chief complaint of perforated stomach ulcer (13 May 2019 13:25) seen by podiatry for left foot medial 1st IPJ joint G2 DFU     HPI:  48 year old man, seen at bedside during podiatry rounds for left foot medial hallux G2 DFU. Patient resting in bed conformably in NAD. Patient denies N/F/V/SOB/CP or calf pain at this time. Patient denies any further pedal complaints at this time     ICU Vital Signs Last 24 Hrs  T(C): 36.8 (20 May 2019 04:52), Max: 37 (19 May 2019 20:17)  T(F): 98.3 (20 May 2019 04:52), Max: 98.6 (19 May 2019 20:17)  HR: 57 (20 May 2019 07:47) (57 - 61)  BP: 132/82 (20 May 2019 07:47) (118/67 - 144/80)  BP(mean): --  ABP: --  ABP(mean): --  RR: 21 (20 May 2019 07:47) (17 - 21)  SpO2: 95% (20 May 2019 07:47) (95% - 97%)                          10.1   9.24  )-----------( 288      ( 19 May 2019 08:14 )             30.4   05-19    142  |  108  |  21  ----------------------------<  170<H>  3.9   |  26  |  1.30    Ca    8.1<L>      19 May 2019 08:14  Phos  2.9     05-19  Mg     1.8     05-19    EXAM:  FOOT LEFT (MINIMUM 3 VIEWS)                            PROCEDURE DATE:  05/13/2019          INTERPRETATION:  LEFT FOOT: AP, LATERAL, OBLIQUE    CLINICAL INFORMATION: Left foot hallux wound.    COMPARISON:  None available    FINDINGS:    External artifact degrades image quality limiting evaluation.    There is focal irregularity and defect in the soft tissues at the medial   aspect of the first MTP joint. Correlate for ulcer/wound.    There is a chronic appearing deformity at the head of the first   metatarsal and MTP joint.    There is subluxation or dislocation at the second MTP joint with   flattening of the second metatarsal head.    There is a chronic appearing deformity at the distal fifth metatarsal and   proximal fifth phalanx.  The alignment at the tarsometatarsal joints remains within normal limits.    Impression:    Findings as discussed above, recommend comparison with prior foot   radiographs.      CRISTIAN BENAVIDES M.D., ATTENDING RADIOLOGIST  This document has been electronically signed. May 13 2019  3:42PM           MEDICATIONS  (STANDING):  amiodarone    Tablet   Oral   amiodarone    Tablet 200 milliGRAM(s) Oral daily  chlorhexidine 2% Cloths 1 Application(s) Topical daily  dextrose 5%. 1000 milliLiter(s) (50 mL/Hr) IV Continuous <Continuous>  dextrose 50% Injectable 12.5 Gram(s) IV Push once  dextrose 50% Injectable 25 Gram(s) IV Push once  dextrose 50% Injectable 25 Gram(s) IV Push once  enoxaparin Injectable 80 milliGRAM(s) SubCutaneous two times a day  insulin lispro (HumaLOG) corrective regimen sliding scale   SubCutaneous Before meals and at bedtime  nicotine -  14 mG/24Hr(s) Patch 1 patch Transdermal daily  pantoprazole    Tablet 40 milliGRAM(s) Oral two times a day  tamsulosin 0.4 milliGRAM(s) Oral at bedtime    MEDICATIONS  (PRN):  acetaminophen   Tablet .. 650 milliGRAM(s) Oral every 6 hours PRN Mild Pain (1 - 3)  dextrose 40% Gel 15 Gram(s) Oral once PRN Blood Glucose LESS THAN 70 milliGRAM(s)/deciliter  glucagon  Injectable 1 milliGRAM(s) IntraMuscular once PRN Glucose LESS THAN 70 milligrams/deciliter  HYDROmorphone  Injectable 0.25 milliGRAM(s) IV Push every 4 hours PRN Severe Pain (7 - 10)  oxyCODONE    IR 5 milliGRAM(s) Oral every 6 hours PRN Moderate Pain (4 - 6)    Objective   Vascular: DP/PT palpable, CFT<3, Temp gradient warm to cool pedal hair absent, no edema present at this time   Derm: Left great toe medial IPJ DFU down to subcutaneous fat, no prob to bone, periwound erythema noted measures no maceration, no drainage, no mal odor, no tracking no tunneling probe to subcutaneous fat   Musc: hammer toe deformity 2-5 with overlapping of digits, pes planus,   Nuero: protective sensation absent

## 2019-05-20 NOTE — ADVANCED PRACTICE NURSE CONSULT - REASON FOR CONSULT
48y    Male    Patient is a 48y old  Male who presents with a chief complaint of perforated stomach ulcer (20 May 2019 11:00)      Type2: DX 2007 year    No Endocrine PCP Diann Rico No Rx home- since lost insurance coverage. Initially on insulin Lantus 40 unit daily with Metformin but stopped in 2012. Denies Hx DKA/HHS, Needs Glucometer- familiar with testing, needs all testing and medicaiton supplies , denies weight, prepares most meals at home.     HPI:  48 year old man, had sudden onset at 7 PM of abdominal pain (11 May 2019 00:30)      PAST MEDICAL & SURGICAL HISTORY:  Diabetes mellitus with no complication  No significant past surgical history      MEDICATIONS  (STANDING):  amiodarone    Tablet   Oral   amiodarone    Tablet 200 milliGRAM(s) Oral daily  chlorhexidine 2% Cloths 1 Application(s) Topical daily  dextrose 5%. 1000 milliLiter(s) (50 mL/Hr) IV Continuous <Continuous>  dextrose 50% Injectable 12.5 Gram(s) IV Push once  dextrose 50% Injectable 25 Gram(s) IV Push once  dextrose 50% Injectable 25 Gram(s) IV Push once  enoxaparin Injectable 80 milliGRAM(s) SubCutaneous two times a day  insulin lispro (HumaLOG) corrective regimen sliding scale   SubCutaneous Before meals and at bedtime  nicotine -  14 mG/24Hr(s) Patch 1 patch Transdermal daily  pantoprazole    Tablet 40 milliGRAM(s) Oral two times a day  tamsulosin 0.4 milliGRAM(s) Oral at bedtime

## 2019-05-20 NOTE — DISCHARGE NOTE PROVIDER - NSDCFUADDINST_GEN_ALL_CORE_FT
-Please follow up with your primary doctor within one week.  -Please follow up with Surgery, Podiatry, Cardiology as outpatient (information below) within one week of discharge.  -Patient and family to set up follow up appointments.  -Continue taking your medications as directed above.  -If symptoms persist/worsen, please call your PMD or return to the ED.

## 2019-05-20 NOTE — DISCHARGE NOTE PROVIDER - PROVIDER TOKENS
PROVIDER:[TOKEN:[[id number]]],PROVIDER:[TOKEN:[[id number]]] PROVIDER:[TOKEN:[[id number]],FOLLOWUP:[1 week]],PROVIDER:[TOKEN:[[id number]],FOLLOWUP:[1 week]],PROVIDER:[TOKEN:[[id number]],FOLLOWUP:[1 week]],FREE:[LAST:[rothman],FIRST:[bayron],PHONE:[phone]],FAX:[(   )    -],ADDRESS:[PCP],FOLLOWUP:[1 week]],PROVIDER:[TOKEN:[[id number]],FOLLOWUP:[1 week]] PROVIDER:[TOKEN:[[id number]],FOLLOWUP:[1 week]],PROVIDER:[TOKEN:[[id number]],FOLLOWUP:[1 week]],PROVIDER:[TOKEN:[[id number]],FOLLOWUP:[1-3 days]],PROVIDER:[TOKEN:[[id number]],FOLLOWUP:[1 week]],PROVIDER:[TOKEN:[[id number]],FOLLOWUP:[1 week]],FREE:[LAST:[rothman],FIRST:[bayron],PHONE:[phone]],FAX:[(   )    -],ADDRESS:[PCP],FOLLOWUP:[1 week]] PROVIDER:[TOKEN:[[id number]],FOLLOWUP:[1 week]],PROVIDER:[TOKEN:[[id number]],FOLLOWUP:[1 week]],PROVIDER:[TOKEN:[[id number]],FOLLOWUP:[1-3 days]],PROVIDER:[TOKEN:[[id number]],FOLLOWUP:[1 week]],PROVIDER:[TOKEN:[[id number]],FOLLOWUP:[Routine]],FREE:[LAST:[rothman],FIRST:[bayron],PHONE:[phone]],FAX:[(   )    -],ADDRESS:[PCP],FOLLOWUP:[1 week]]

## 2019-05-20 NOTE — DISCHARGE NOTE PROVIDER - CARE PROVIDERS DIRECT ADDRESSES
,DirectAddress_Unknown,[email or web address] ,DirectAddress_Unknown,[email or web address],DirectAddress_Unknown,DirectAddress_Unknown,DirectAddress_Unknown ,DirectAddress_Unknown,[email or web address],DirectAddress_Unknown,DirectAddress_Unknown,[email or web address],DirectAddress_Unknown

## 2019-05-20 NOTE — ADVANCED PRACTICE NURSE CONSULT - ASSESSMENT
Vital Signs Last 24 Hrs  T(C): 37.4 (20 May 2019 12:04), Max: 37.4 (20 May 2019 12:04)  T(F): 99.3 (20 May 2019 12:04), Max: 99.3 (20 May 2019 12:04)  HR: 77 (20 May 2019 12:04) (57 - 87)  BP: 120/78 (20 May 2019 12:04) (118/67 - 144/76)  BP(mean): --  RR: 20 (20 May 2019 12:04) (17 - 21)  SpO2: 95% (20 May 2019 12:04) (94% - 97%)    Hemoglobin A1C, Whole Blood: 8.0 % (05-12-19 @ 11:07)  Hemoglobin A1C, Whole Blood: 8.0 % (05-11-19 @ 11:43)   eGFR if Non African American: 64 mL/min/1.73M2 (05-19-19 @ 08:14)  eGFR if : 74 mL/min/1.73M2 (05-19-19 @ 08:14)  eGFR if Non African American: 64 mL/min/1.73M2 (05-18-19 @ 05:56)  eGFR if : 74 mL/min/1.73M2 (05-18-19 @ 05:56)      CAPILLARY BLOOD GLUCOSE      POCT Blood Glucose.: 179 mg/dL (20 May 2019 11:35)  POCT Blood Glucose.: 173 mg/dL (20 May 2019 07:43)  POCT Blood Glucose.: 271 mg/dL (19 May 2019 21:03)  POCT Blood Glucose.: 161 mg/dL (19 May 2019 16:50)      DIET:

## 2019-05-20 NOTE — DISCHARGE NOTE PROVIDER - HOSPITAL COURSE
Pt is a 48 Y.O. M who presented to [place] ED with complaint of abdominal pain on 5/10/19. CT Abd/Pelvis revealed perforated antral ulcer with free air Pt underwent exploratory laparotomy and was found to have 1cm perforated pyloric ulcer with mild chemical peritonitis. Pt underwent Omentopexy, Plication of gastric ulcer. JO drain was left in and wound left partially open for drainage. NGT was kept in place for decompressiong. Pt was transferred to the ICU post-operatively and for severe sepsis in the setting of perforated gastric ulcer, peritonitis, pre-renal vs. ischemic ATN, metabolic acidosis. Pt was kept on IV Zosyn. On POD#2, jacques catheter required re-insertion due to retention. Pt was seen by podiatry for Left foot hallux wound and recommendations made and patient followed throughout hospital stay. Blood cultures and sputum cultures were no growth to date. On POD#4, pt had episode of Rapid A-fib and given Amiodarone and Cardizem drip for rate control. Pt was seen and followed by cardiology. Previous cardiac Echo revelaed normal LV function, moderate mitral regurgitation. Pt also had evidence of elevated troponin. Pt is a 48 Y.O. M who presented to [place] ED with complaint of abdominal pain on 5/10/19. CT Abd/Pelvis revealed perforated antral ulcer with free air Pt underwent exploratory laparotomy and was found to have 1cm perforated pyloric ulcer with mild chemical peritonitis. Pt underwent Omentopexy, Plication of gastric ulcer. JO drain was left in and wound left partially open for drainage. NGT was kept in place for decompressiong. Pt was transferred to the ICU post-operatively and for severe sepsis in the setting of perforated gastric ulcer, peritonitis, pre-renal vs. ischemic ATN, metabolic acidosis. Pt was kept on IV Zosyn. Pt was placed on GI prophylaxis with PPI. On POD#2, jacques catheter required re-insertion due to retention. Pt was seen by podiatry for Left foot hallux wound and recommendations made and patient followed throughout hospital stay. Blood cultures and sputum cultures were no growth to date. On POD#4, pt had episode of Rapid A-fib and given Amiodarone and Cardizem drip for rate control. Pt was seen and followed by cardiology. Previous cardiac Echo revelaed normal LV function, moderate mitral regurgitation. Pt also had evidence of elevated troponin with NSTEMI. Pt placed on therapeutic lovenox for anticoagulation. Repeat ECHO performed after NSTEMI relatively unchanged from ECHO performed 5/13. On POD#6, pt was found to have minimal residuals after NGT was clamp trailed for 48 hrs and thus NGT was removed and diet advanced until patient tolerated regular diet. Pt was transitioned from IV cardizem and digoxin to oral amiodarone for rate control. Pt will need ischemia work-up once hemodynamically stable. Pt required re-insertion of jacques on POD#7 and plan was to obtain urology consult. Pt is a 48 Y.O. M who presented to [place] ED with complaint of abdominal pain on 5/10/19. CT Abd/Pelvis revealed perforated antral ulcer with free air Pt underwent exploratory laparotomy and was found to have 1cm perforated pyloric ulcer with mild chemical peritonitis. Pt underwent Omentopexy, Plication of gastric ulcer. JO drain was left in and wound left partially open for drainage. NGT was kept in place for decompressiong. Pt was transferred to the ICU post-operatively and for severe sepsis in the setting of perforated gastric ulcer, peritonitis, pre-renal vs. ischemic ATN, metabolic acidosis. Pt was kept on IV Zosyn. Pt was placed on GI prophylaxis with PPI. On POD#2, jacques catheter required re-insertion due to retention. Pt was seen by podiatry for Left foot hallux wound and recommendations made and patient followed throughout hospital stay. Blood cultures and sputum cultures were no growth to date. On POD#4, pt had episode of Rapid A-fib and given Amiodarone and Cardizem drip for rate control. Pt was seen and followed by cardiology. Previous cardiac Echo revelaed normal LV function, moderate mitral regurgitation. Pt also had evidence of elevated troponin with NSTEMI. Pt placed on therapeutic lovenox for anticoagulation. Repeat ECHO performed after NSTEMI relatively unchanged from ECHO performed 5/13. On POD#6, pt was found to have minimal residuals after NGT was clamp trailed for 48 hrs and thus NGT was removed and diet advanced until patient tolerated regular diet. Pt was transitioned from IV cardizem and digoxin to oral amiodarone for rate control. Pt will need ischemia work-up once hemodynamically stable. Pt required re-insertion of jacques on POD#7, Dr. Mejia spoke with Dr. Troy who agreed to see patient in office after discharge from rehab. Pt is a 48 Y.O. M who presented to [place] ED with complaint of abdominal pain on 5/10/19. CT Abd/Pelvis revealed perforated antral ulcer with free air Pt underwent exploratory laparotomy and was found to have 1cm perforated pyloric ulcer with mild chemical peritonitis. Pt underwent Omentopexy, Plication of gastric ulcer. JO drain was left in and wound left partially open for drainage. NGT was kept in place for decompressiong. Pt was transferred to the ICU post-operatively and for severe sepsis in the setting of perforated gastric ulcer, peritonitis, pre-renal vs. ischemic ATN, metabolic acidosis. Pt was kept on IV Zosyn. Pt was placed on GI prophylaxis with PPI. On POD#2, jacques catheter required re-insertion due to retention. Pt was seen by podiatry for Left foot hallux wound. MRI recommended at time of consult but patient refused to have performed until surgical staples removed. Benefits and risks explained and patient exhibited full understanding. Blood cultures and sputum cultures were no growth to date. On POD#4, pt had episode of Rapid A-fib and given Amiodarone and Cardizem drip for rate control. Pt was seen and followed by cardiology. Previous cardiac Echo revelaed normal LV function, moderate mitral regurgitation. Pt also had evidence of elevated troponin with NSTEMI. Pt placed on therapeutic lovenox for anticoagulation. Repeat ECHO performed after NSTEMI relatively unchanged from ECHO performed 5/13. On POD#6, pt was found to have minimal residuals after NGT was clamp trailed for 48 hrs and thus NGT was removed and diet advanced until patient tolerated regular diet. Pt was transitioned from IV cardizem and digoxin to oral amiodarone for rate control. Pt will need ischemia work-up once hemodynamically stable. Pt required re-insertion of jacques on POD#7, Dr. Mejia spoke with Dr. Troy who agreed to see patient in office after discharge from rehab. During hospital stay, patient agreed to MRI of left foot which showed first distal metatarsal osteomyelitis, with enhancement in the surrounding soft tissues. Soft tissue wound/ulceration, no focal soft tissue abscess. Chronic appearing irregularity at the distal fifth metatarsal, without acute bone marrow edema enhancement. Chronic-appearing deformity of the distal second metatarsal, with dislocation at the second MTP joint.. Patient had left foot hallux removed and partial ray resection with podiatry, Dr. Hale. He was started on IV Cefazolin. PT evaluated and recommended __________. Pt is a 48 Y.O. M who presented to [place] ED with complaint of abdominal pain on 5/10/19. CT Abd/Pelvis revealed perforated antral ulcer with free air Pt underwent exploratory laparotomy and was found to have 1cm perforated pyloric ulcer with mild chemical peritonitis. Pt underwent Omentopexy, Plication of gastric ulcer. JO drain was left in and wound left partially open for drainage. NGT was kept in place for decompressiong. Pt was transferred to the ICU post-operatively and for severe sepsis in the setting of perforated gastric ulcer, peritonitis, pre-renal vs. ischemic ATN, metabolic acidosis. Pt was kept on IV Zosyn. Pt was placed on GI prophylaxis with PPI. On POD#2, jacques catheter required re-insertion due to retention. Pt was seen by podiatry for Left foot hallux wound. MRI recommended at time of consult but patient refused to have performed until surgical staples removed. Benefits and risks explained and patient exhibited full understanding. Blood cultures and sputum cultures were no growth to date. On POD#4, pt had episode of Rapid A-fib and given Amiodarone and Cardizem drip for rate control. Pt was seen and followed by cardiology. Previous cardiac Echo revelaed normal LV function, moderate mitral regurgitation. Pt also had evidence of elevated troponin with NSTEMI. Pt placed on therapeutic lovenox for anticoagulation. but was taken off as risks outweighed the benefits. Repeat ECHO performed after NSTEMI relatively unchanged from ECHO performed 5/13. On POD#6, pt was found to have minimal residuals after NGT was clamp trailed for 48 hrs and thus NGT was removed and diet advanced until patient tolerated regular diet. Pt was transitioned from IV cardizem and digoxin to oral amiodarone for rate control. Pt will need ischemia work-up once hemodynamically stable. Pt required re-insertion of jacques on POD#7, Dr. Mejia spoke with Dr. Troy who agreed to see patient in office after discharge. During hospital stay, patient agreed to MRI of left foot which showed first distal metatarsal osteomyelitis, with enhancement in the surrounding soft tissues. Soft tissue wound/ulceration, no focal soft tissue abscess. Chronic appearing irregularity at the distal fifth metatarsal, without acute bone marrow edema enhancement. Chronic-appearing deformity of the distal second metatarsal, with dislocation at the second MTP joint.. Patient had left foot hallux removed and partial ray resection with podiatry, Dr. Hale on 5/28. He was started on IV Cefazolin. Tissue path was NGTD. Surgical path revealed __________PT evaluated and recommended __________. FROM ADMISSION H+P:     HPI:        47 yo M with PMH DM (uncontrolled) and HTN, heavy smoker, h/o chronic umbilical hernia, (PMD Dr. Linette Orellana in [place]) p/w pain in epigastric pain on inspiration. Pain started today and is 10/10 severe, and was exacerbated when laying flat. Pt was noted to have an acute gastric ulcer perforation.              Hospital Course:         48 Y.O. M who presented to [place] ED with complaint of abdominal pain on 5/10/19. CT Abd/Pelvis revealed perforated antral ulcer with free air. Pt underwent exploratory laparotomy and was found to have 1cm perforated pyloric ulcer with mild chemical peritonitis. Pt underwent Omentopexy, Plication of gastric ulcer. JO drain was left in and wound left partially open for drainage. NGT was kept in place for decompressiong. Pt was transferred to the ICU post-operatively and for severe sepsis in the setting of perforated gastric ulcer, peritonitis, pre-renal vs. ischemic ATN, metabolic acidosis. Pt was kept on IV Zosyn. Pt was placed on GI prophylaxis with PPI. On POD#2, jacques catheter required re-insertion due to retention. Pt was seen by podiatry for Left foot hallux wound. MRI recommended at time of consult but patient refused to have performed until surgical staples removed. Benefits and risks explained and patient exhibited full understanding. Blood cultures and sputum cultures were no growth to date. On POD#4, pt had episode of Rapid A-fib and given Amiodarone and Cardizem drip for rate control. Pt was seen and followed by cardiology. Previous cardiac Echo revelaed normal LV function, moderate mitral regurgitation. Pt also had evidence of elevated troponin with NSTEMI. Pt placed on therapeutic lovenox for anticoagulation. but was taken off as risks outweighed the benefits. Repeat ECHO performed after NSTEMI relatively unchanged from ECHO performed 5/13. On POD#6, pt was found to have minimal residuals after NGT was clamp trailed for 48 hrs and thus NGT was removed and diet advanced until patient tolerated regular diet. Pt was transitioned from IV cardizem and digoxin to oral amiodarone for rate control. Pt will need ischemia work-up once hemodynamically stable. Pt required re-insertion of jacques on POD#7, Dr. Mejia spoke with Dr. Troy who agreed to see patient in office after discharge. During hospital stay, patient agreed to MRI of left foot which showed first distal metatarsal osteomyelitis, with enhancement in the surrounding soft tissues. Soft tissue wound/ulceration, no focal soft tissue abscess. Chronic appearing irregularity at the distal fifth metatarsal, without acute bone marrow edema enhancement. Chronic-appearing deformity of the distal second metatarsal, with dislocation at the second MTP joint. Patient had left foot hallux removed and partial ray resection with podiatry, Dr. Hale on 5/28. He was started on IV Cefazolin. Tissue path was 1st metatarsal staph and coag neg staph. Great toe tissue culture negative. Surgical Path showed clear margins. Antibiotics discontinued. PT evaluated and recommended ELIAS for bilateral foot drop especially to right foot > left, decreased sensation & motor strength due to neuropathies & altered gait & balance due to impairments.  Jacques removed 6/2/19. PT worked on gait training, endurance, postural re-education, strength and balance training.        Stable for discharge.        ---    CONSULTANTS:     Dr. Colunga (Surgery)    Dr. Hale (Podiatry)    Dr. Craig Perlman (Endocrinology)    Dr. Soto (Cardiology)    Dr. Whitten (Infectious Disease)        ---    TIME SPENT:    The total amount of time spent reviewing the hospital notes, laboratory values, imaging findings, assessing/counseling the patient, discussing with consultant physicians, social work, nursing staff took -- minutes        ---    FINAL DISCHARGE DIAGNOSIS LIST:    Please see last daily progress note for final discharge diagnoses        ---    Primary care provider was made aware of plan for discharge:      [  ] NO     [  ] YES FROM ADMISSION H+P:     HPI:        49 yo M with PMH DM (uncontrolled) and HTN, heavy smoker, h/o chronic umbilical hernia, (PMD Dr. Linette Orellana in [place]) p/w pain in epigastric pain on inspiration. Pain started today and is 10/10 severe, and was exacerbated when laying flat. Pt was noted to have an acute gastric ulcer perforation.              Hospital Course:         48 Y.O. M who presented to [place] ED with complaint of abdominal pain on 5/10/19. CT Abd/Pelvis revealed perforated antral ulcer with free air. Pt underwent exploratory laparotomy and was found to have 1cm perforated pyloric ulcer with mild chemical peritonitis. Pt underwent Omentopexy, Plication of gastric ulcer. JO drain was left in and wound left partially open for drainage. NGT was kept in place for decompressiong. Pt was transferred to the ICU post-operatively and for severe sepsis in the setting of perforated gastric ulcer, peritonitis, pre-renal vs. ischemic ATN, metabolic acidosis. Pt was kept on IV Zosyn. Pt was placed on GI prophylaxis with PPI. On POD#2, jacques catheter required re-insertion due to retention. Pt was seen by podiatry for Left foot hallux wound. MRI recommended at time of consult but patient refused to have performed until surgical staples removed. Benefits and risks explained and patient exhibited full understanding. Blood cultures and sputum cultures were no growth to date. On POD#4, pt had episode of Rapid A-fib and given Amiodarone and Cardizem drip for rate control. Pt was seen and followed by cardiology. Previous cardiac Echo revelaed normal LV function, moderate mitral regurgitation. Pt also had evidence of elevated troponin with NSTEMI. Pt placed on therapeutic lovenox for anticoagulation. but was taken off as risks outweighed the benefits. Repeat ECHO performed after NSTEMI relatively unchanged from ECHO performed 5/13. On POD#6, pt was found to have minimal residuals after NGT was clamp trailed for 48 hrs and thus NGT was removed and diet advanced until patient tolerated regular diet. Pt was transitioned from IV cardizem and digoxin to oral amiodarone for rate control. Pt will need ischemia work-up once hemodynamically stable. Pt required re-insertion of jacques on POD#7, Dr. Mejia spoke with Dr. Troy who agreed to see patient in office after discharge. During hospital stay, patient agreed to MRI of left foot which showed first distal metatarsal osteomyelitis, with enhancement in the surrounding soft tissues. Soft tissue wound/ulceration, no focal soft tissue abscess. Chronic appearing irregularity at the distal fifth metatarsal, without acute bone marrow edema enhancement. Chronic-appearing deformity of the distal second metatarsal, with dislocation at the second MTP joint. Patient had left foot hallux removed and partial ray resection with podiatry, Dr. Hale on 5/28. He was started on IV Cefazolin. Tissue path was 1st metatarsal staph and coag neg staph. Great toe tissue culture negative. Surgical Path showed clear margins. Antibiotics discontinued. PT evaluated and recommended ELIAS for bilateral foot drop especially to right foot > left, decreased sensation & motor strength due to neuropathies & altered gait & balance due to impairments.  Jacques removed 6/2/19. PT worked on gait training, endurance, postural re-education, strength and balance training.         Stable for discharge.        ---    CONSULTANTS:     Dr. Colunga (Surgery)    Dr. Hale (Podiatry)    Dr. Craig Perlman (Endocrinology)    Dr. Soto (Cardiology)    Dr. Whitten (Infectious Disease)        ---    TIME SPENT:    The total amount of time spent reviewing the hospital notes, laboratory values, imaging findings, assessing/counseling the patient, discussing with consultant physicians, social work, nursing staff took -- minutes        ---    FINAL DISCHARGE DIAGNOSIS LIST:    Please see last daily progress note for final discharge diagnoses        ---    Primary care provider was made aware of plan for discharge:      [  ] NO     [  ] YES

## 2019-05-20 NOTE — DISCHARGE NOTE PROVIDER - NSDCCPCAREPLAN_GEN_ALL_CORE_FT
PRINCIPAL DISCHARGE DIAGNOSIS  Diagnosis: Acute gastric ulcer with perforation  Assessment and Plan of Treatment: PRINCIPAL DISCHARGE DIAGNOSIS  Diagnosis: Acute gastric ulcer with perforation  Assessment and Plan of Treatment: Continue incentive spirometry and supportive care. Your diet was advanced as tolerated. Please follow up with your PCP in 1 week.      SECONDARY DISCHARGE DIAGNOSES  Diagnosis: Osteomyelitis of left foot  Assessment and Plan of Treatment: You had a left hallux resection with podiatry and you were treated with IV antibiotics. Follow up with Dr. Hale in 1 week. PRINCIPAL DISCHARGE DIAGNOSIS  Diagnosis: Acute gastric ulcer with perforation  Assessment and Plan of Treatment: Continue incentive spirometry and supportive care. Your diet was advanced as tolerated. Please follow up with your PCP and Surgery in 1 week.      SECONDARY DISCHARGE DIAGNOSES  Diagnosis: Urinary retention  Assessment and Plan of Treatment: continue taking medications as prescribed    Diagnosis: Chest pain  Assessment and Plan of Treatment: Possible NSTEMI. Please follow up [facility] cardiology as outpatient for further management for further workup and managament.    Diagnosis: Afib  Assessment and Plan of Treatment: Cardio did not recommend you needing long term anticoagulation. Please continue taking amiodarone as prescribed. Cardiology will likely switch you to a BB as outpatient. Please follow them as outpatient. cardiology recommend an event monitor as outpatient. Please follow up with your cardiologist as outpatient as further management.    Diagnosis: Diabetes mellitus type 2, uncontrolled  Assessment and Plan of Treatment: Please continue to take your medications as prescribed. Please follow up with endocrinology as outpatient for further management.    Diagnosis: Osteomyelitis of left foot  Assessment and Plan of Treatment: You had a left hallux resection with podiatry and you were treated with IV antibiotics. Follow up with Dr. Hale in 1 week. PRINCIPAL DISCHARGE DIAGNOSIS  Diagnosis: Acute gastric ulcer with perforation  Assessment and Plan of Treatment: Continue incentive spirometry and supportive care. Your diet was advanced as tolerated. Please follow up with your PCP and Surgery in 1 week.      SECONDARY DISCHARGE DIAGNOSES  Diagnosis: Anemia  Assessment and Plan of Treatment: You had anemia and in the setting of atrial fibrilation and, NSTEMI, required a transfusion of one unit of blood.  Please follow up with yor primary care provider for further management within 1 week.    Diagnosis: Urinary retention  Assessment and Plan of Treatment: Continue taking medications as prescribed    Diagnosis: Afib  Assessment and Plan of Treatment: You have an irregular heart rhythm called Atrial fibrillation that was converted to normal sinus rhythm. Cardiology did not recommend long term anticoagulation. Please continue taking amiodarone as prescribed. Cardiology will likely switch you to a BB as outpatient and recommend an event monitor as outpatient. Please follow up with your cardiologist as outpatient.    Diagnosis: Diabetes mellitus type 2, uncontrolled  Assessment and Plan of Treatment: Your A1c was 8. Please continue to take your insulin medications as prescribed. Please follow up with endocrinology as outpatient for further management.    Diagnosis: Chest pain  Assessment and Plan of Treatment: NSTEMI. Please follow up with cardiology as outpatient for further management for further workup and managament.    Diagnosis: Osteomyelitis of left foot  Assessment and Plan of Treatment: You had a left hallux resection with podiatry and you completed the treamment course of IV antibiotics in the hospital. Follow up with Dr. Hale within 1 week of discharge PRINCIPAL DISCHARGE DIAGNOSIS  Diagnosis: Acute gastric ulcer with perforation  Assessment and Plan of Treatment: Continue incentive spirometry and supportive care. Your diet was advanced as tolerated. Please follow up with your PCP and Surgery in 1 week.      SECONDARY DISCHARGE DIAGNOSES  Diagnosis: Anemia  Assessment and Plan of Treatment: You had anemia and in the setting of atrial fibrilation and, NSTEMI, required a transfusion of one unit of blood.  Please follow up with yor primary care provider for further management within 1 week.    Diagnosis: Urinary retention  Assessment and Plan of Treatment: Continue taking medications as prescribed    Diagnosis: Afib  Assessment and Plan of Treatment: You have an irregular heart rhythm called Atrial fibrillation that was converted to normal sinus rhythm. Cardiology did not recommend long term anticoagulation. Please continue taking amiodarone as prescribed. Cardiology will likely switch you to a BB as outpatient and recommend an event monitor as outpatient. Please follow up with your cardiologist as outpatient.    Diagnosis: Diabetes mellitus type 2, uncontrolled  Assessment and Plan of Treatment: Your A1c was 8. Please continue to take your insulin medications as prescribed. Please follow up with endocrinology as outpatient for further management.    Diagnosis: Chest pain  Assessment and Plan of Treatment: NSTEMI. Please follow up with cardiology as outpatient for further management for further workup and managament.    Diagnosis: Osteomyelitis of left foot  Assessment and Plan of Treatment: You had a left hallux resection with podiatry and you completed the treamment course of IV antibiotics in the hospital. Ambulate with surgical show and partial weight bearing. Follow up with Dr. Hale within 1 week of discharge PRINCIPAL DISCHARGE DIAGNOSIS  Diagnosis: Acute gastric ulcer with perforation  Assessment and Plan of Treatment: You had a stomach ulcer that perforated and required surgical repair. Continue incentive spirometry and supportive care. Your diet was advanced as tolerated. Please follow up with your PCP and Surgery in 1 week.      SECONDARY DISCHARGE DIAGNOSES  Diagnosis: Anemia  Assessment and Plan of Treatment: You had anemia and in the setting of atrial fibrilation and, NSTEMI, required a transfusion of one unit of blood.  Please follow up with yor primary care provider for further management within 1 week.    Diagnosis: Urinary retention  Assessment and Plan of Treatment: Continue taking medications as prescribed    Diagnosis: Afib  Assessment and Plan of Treatment: You have an irregular heart rhythm called Atrial fibrillation that was converted to normal sinus rhythm. Cardiology did not recommend long term anticoagulation. Please continue taking amiodarone as prescribed. Cardiology will likely switch you to a BB as outpatient and recommend an event monitor as outpatient. Please follow up with your cardiologist as within 1 week from discharge.    Diagnosis: Diabetes mellitus type 2, uncontrolled  Assessment and Plan of Treatment: Your A1c was 8. Please continue to take your insulin medications as prescribed. Please follow up with endocrinology as outpatient for further management. Please follow up with Dr. Craig Perlman for diabetes managment upon discharge.    Diagnosis: Chest pain  Assessment and Plan of Treatment: NSTEMI. Please follow up with Dr. Soto cardiology as outpatient for further management for further workup and managament within 1 week of discharge.    Diagnosis: Osteomyelitis of left foot  Assessment and Plan of Treatment: You had a left hallux resection with podiatry and you completed the treamment course of IV antibiotics in the hospital. Ambulate with surgical show and partial weight bearing. Follow up with Dr. Hale within 1 week of discharge PRINCIPAL DISCHARGE DIAGNOSIS  Diagnosis: Acute gastric ulcer with perforation  Assessment and Plan of Treatment: You had a stomach ulcer that perforated and required surgical repair. Continue incentive spirometry and supportive care. Your diet was advanced as tolerated. Please follow up with your PCP and Surgery in 1 week.      SECONDARY DISCHARGE DIAGNOSES  Diagnosis: Anemia  Assessment and Plan of Treatment: You had anemia and in the setting of atrial fibrilation and, NSTEMI, required a transfusion of one unit of blood.  Please follow up with yor primary care provider for further management within 1 week.    Diagnosis: Urinary retention  Assessment and Plan of Treatment: Started on flomax    Diagnosis: Afib  Assessment and Plan of Treatment: You have an irregular heart rhythm called Atrial fibrillation that was converted to normal sinus rhythm. Cardiology did not recommend long term anticoagulation. Please continue taking amiodarone as prescribed. Cardiology will likely switch you to a BB as outpatient and recommend an event monitor as outpatient. Please follow up with your cardiologist as within 1 week from discharge.    Diagnosis: Diabetes mellitus type 2, uncontrolled  Assessment and Plan of Treatment: Your A1c was 8. Please continue to take your insulin medications as prescribed. Please follow up with endocrinology as outpatient for further management. Please follow up with Dr. Craig Perlman for diabetes managment upon discharge.    Diagnosis: Chest pain  Assessment and Plan of Treatment: NSTEMI. Please follow up with Dr. Soto cardiology as outpatient for further management for further workup and managament within 1 week of discharge.    Diagnosis: Osteomyelitis of left foot  Assessment and Plan of Treatment: You had a left hallux resection with podiatry and you completed the treamment course of IV antibiotics in the hospital. Ambulate with surgical show and partial weight bearing. Follow up with Dr. Hale within 1 week of discharge

## 2019-05-20 NOTE — DISCHARGE NOTE PROVIDER - CARE PROVIDER_API CALL
Ruperto Mejia)  [facility]  [address], [address]  [place] [address]  Phone: [phone]  Fax: [phone]  Follow Up Time:     Juventino Soto)  Cardiovascular Disease  [address] [place] [address]  Phone: [phone]  Fax: [phone]  Follow Up Time: Ruperto Mejia)  [facility]  [address], [address]  [place] [address]  Phone: [phone]  Fax: [phone]  Follow Up Time: 1 week    Juventino Soto)  Cardiovascular Disease  [address] [place] [address]  Phone: [phone]  Fax: [phone]  Follow Up Time: 1 week    Jimbo Hale (DPM)  Podiatric Medicine and Surgery  [address] [place] [address]  Phone: [phone]  Fax: [phone]  Follow Up Time: 1 week    bayron rothman  PCP  Phone: [phone]  Fax: (   )    -  Follow Up Time: 1 week    Perlman, Craig D (DO)  Medicine  [address] [place] [address], [address]  [place] [address]  Phone: [phone]  Fax: [phone]  Follow Up Time: 1 week Ruperto Mejia)  [facility]  [address], [address]  [place] [address]  Phone: [phone]  Fax: [phone]  Follow Up Time: 1 week    Juventino Soto)  Cardiovascular Disease  [address] [place] [address]  Phone: [phone]  Fax: [phone]  Follow Up Time: 1 week    Jimbo Hale (DPM)  Podiatric Medicine and Surgery  [address] [place] [address]  Phone: [phone]  Fax: [phone]  Follow Up Time: 1-3 days    Perlman, Craig D ()  Medicine  [address] [place] [address], [address]  [place] [address]  Phone: [phone]  Fax: [phone]  Follow Up Time: 1 week    Vasu Troy)  Urology  [address], [address]  [place] [address]  Phone: [phone]  Fax: [phone]  Follow Up Time: 1 week    bayron rothman  PCP  Phone: [phone]  Fax: (   )    -  Follow Up Time: 1 week Ruperto Mejia)  [facility]  [address], [address]  [place] [address]  Phone: [phone]  Fax: [phone]  Follow Up Time: 1 week    Juventino Soto)  Cardiovascular Disease  [address] [place] [address]  Phone: [phone]  Fax: [phone]  Follow Up Time: 1 week    Jimbo Hale (DPM)  Podiatric Medicine and Surgery  [address] [place] [address]  Phone: [phone]  Fax: [phone]  Follow Up Time: 1-3 days    Perlman, Craig D ()  Medicine  [address] [place] [address], [address]  [place] [address]  Phone: [phone]  Fax: [phone]  Follow Up Time: 1 week    Vasu Troy)  Urology  [address], [address]  [place] [address]  Phone: [phone]  Fax: [phone]  Follow Up Time: Routine    bayron rothman  PCP  Phone: [phone]  Fax: (   )    -  Follow Up Time: 1 week

## 2019-05-20 NOTE — DISCHARGE NOTE PROVIDER - NSDCCPTREATMENT_GEN_ALL_CORE_FT
PRINCIPAL PROCEDURE  Procedure: Omentopexy  Findings and Treatment:       SECONDARY PROCEDURE  Procedure: Plication of gastric ulcer  Findings and Treatment:

## 2019-05-20 NOTE — DISCHARGE NOTE PROVIDER - NSDCFUADDAPPT_GEN_ALL_CORE_FT
Call Dr. Mejia office for an appointment for follow up in 1 week. Call Dr. Mejia and Dr. Soto's office for an appointment for follow up in 1 week.

## 2019-05-20 NOTE — DISCHARGE NOTE PROVIDER - NSDCACTIVITY_GEN_ALL_CORE
Showering allowed/Walking - Indoors allowed/Do not drive or operate machinery/Do not make important decisions/No heavy lifting/straining/Walking - Outdoors allowed

## 2019-05-20 NOTE — PROGRESS NOTE ADULT - SUBJECTIVE AND OBJECTIVE BOX
[facility] -- Bolivar Carbajal, El, Brittany, Reynaldo Sweeney Savella  Office # [phone]    Follow Up:  Preop [place]    Subjective/Observations: Awake and alert, comfortable on RA.  Denies any respiratory or cardiac discomfort.  Denies incisional pain.    REVIEW OF SYSTEMS: All other review of systems is negative unless indicated above    PAST MEDICAL & SURGICAL HISTORY:  Diabetes mellitus with no complication  No significant past surgical history    MEDICATIONS  (STANDING):  amiodarone    Tablet   Oral   amiodarone    Tablet 200 milliGRAM(s) Oral daily  chlorhexidine 2% Cloths 1 Application(s) Topical daily  dextrose 5%. 1000 milliLiter(s) (50 mL/Hr) IV Continuous <Continuous>  dextrose 50% Injectable 12.5 Gram(s) IV Push once  dextrose 50% Injectable 25 Gram(s) IV Push once  dextrose 50% Injectable 25 Gram(s) IV Push once  enoxaparin Injectable 80 milliGRAM(s) SubCutaneous two times a day  insulin lispro (HumaLOG) corrective regimen sliding scale   SubCutaneous Before meals and at bedtime  nicotine -  14 mG/24Hr(s) Patch 1 patch Transdermal daily  pantoprazole    Tablet 40 milliGRAM(s) Oral two times a day  tamsulosin 0.4 milliGRAM(s) Oral at bedtime    MEDICATIONS  (PRN):  acetaminophen   Tablet .. 650 milliGRAM(s) Oral every 6 hours PRN Mild Pain (1 - 3)  dextrose 40% Gel 15 Gram(s) Oral once PRN Blood Glucose LESS THAN 70 milliGRAM(s)/deciliter  glucagon  Injectable 1 milliGRAM(s) IntraMuscular once PRN Glucose LESS THAN 70 milligrams/deciliter  HYDROmorphone  Injectable 0.25 milliGRAM(s) IV Push every 4 hours PRN Severe Pain (7 - 10)  oxyCODONE    IR 5 milliGRAM(s) Oral every 6 hours PRN Moderate Pain (4 - 6)    Allergies    No Known Allergies    Intolerances    Vital Signs Last 24 Hrs  T(C): 36.8 (20 May 2019 04:52), Max: 37 (19 May 2019 20:17)  T(F): 98.3 (20 May 2019 04:52), Max: 98.6 (19 May 2019 20:17)  HR: 57 (20 May 2019 07:47) (57 - 61)  BP: 132/82 (20 May 2019 07:47) (118/67 - 144/80)  BP(mean): --  RR: 21 (20 May 2019 07:47) (17 - 21)  SpO2: 95% (20 May 2019 07:47) (95% - 97%)    I&O's Summary    19 May 2019 07:01  -  20 May 2019 07:00  --------------------------------------------------------  IN: 240 mL / OUT: 2950 mL / NET: -2710 mL    20 May 2019 07:01  -  20 May 2019 11:01  --------------------------------------------------------  IN: 360 mL / OUT: 0 mL / NET: 360 mL    PHYSICAL EXAM:  TELE: NSR  Constitutional: NAD, awake and alert, obese  HEENT: Moist Mucous Membranes, Anicteric  Pulmonary: Non-labored, breath sounds are diminished at bases bilaterally, No wheezing, rales or rhonchi  Cardiovascular: Regular, S1 and S2, No murmurs, rubs, gallops or clicks  Gastrointestinal: Bowel Sounds present, soft, nontender.   Lymph: No peripheral edema. No lymphadenopathy.  Skin: No visible rashes or ulcers.  Midabdominal incision approximated  Psych:  Mood & affect appropriate    LABS: All Labs Reviewed:                        10.1   9.24  )-----------( 288      ( 19 May 2019 08:14 )             30.4                         10.0   9.45  )-----------( 280      ( 18 May 2019 05:56 )             30.5     19 May 2019 08:14    142    |  108    |  21     ----------------------------<  170    3.9     |  26     |  1.30   18 May 2019 05:56    143    |  107    |  20     ----------------------------<  129    3.5     |  29     |  1.30     Ca    8.1        19 May 2019 08:14  Ca    7.6        18 May 2019 05:56  Phos  2.9       19 May 2019 08:14  Phos  2.8       18 May 2019 05:56  Mg     1.8       19 May 2019 08:14  Mg     1.9       18 May 2019 05:56    < from: TTE Echo Doppler w/o Cont (05.16.19 @ 09:45) >     EXAM:  ECHO TTE WO CON COMP W DOPPLR         PROCEDURE DATE:  05/16/2019        INTERPRETATION:  INDICATION: Elevated troponin    Blood Pressure 137/62    Height 187.96 cm     Weight 81.6 kg       BSA   2.1 sq m    Dimensions:    LA 4.1       Normal Values: 2.0 - 4.0 cm    Ao 3.6        Normal Values: 2.0 - 3.8 cm  SEPTUM 1.3       Normal Values: 0.6 - 1.2 cm  PWT 1.3       Normal Values: 0.6 - 1.1 cm  LVIDd 6.0         Normal Values: 3.0 - 5.6 cm  LVIDs 3.1         Normal Values: 1.8 - 4.0 cm    OBSERVATIONS:    Mitral Valve: Thickened leaflets, moderate MR.  Aortic Valve/Aorta: Sclerotic trileaflet aortic valve with normal   opening. Trace AI  Tricuspid Valve: normal with mild TR.  Pulmonic Valve: normal  Left Atrium: Mildly dilated  Right Atrium: normal  Left Ventricle: normal LV size and systolic function, estimated LVEF of   65%. No evidence of segmental dysfunction. Basal septal hypertrophy is   present  Right Ventricle: normal size and systolic function.  Pericardium/Pleura: normal, no significant pericardial effusion.    Conclusion:     normal LV size and systolic function, estimated LVEF of 65%. No evidence   of segmental dysfunction  Normal RV size and systolic function.   Mild left atrial enlargement  Sclerotic trileaflet aortic valve with normal opening. Trace AI  Mitral valve with thickened leaflets and moderate MR  Mild TR  No significant pericardial effusion.      MIROSLAVA CASTAÑEDA   This document has been electronically signed. May 17 2019  7:45AM     < end of copied text >    < from: Xray Chest 1 View- PORTABLE-Routine (05.12.19 @ 05:45) >    EXAM:  XR CHEST PORTABLE ROUTINE 1V                          PROCEDURE DATE:  05/12/2019      INTERPRETATION:  Clinical information: Productive cough    Portable study, 5:39 AM    Comparison study dated 5/10/2019, 10:29 PM.    Cardiac monitorleads present. A nasogastric tube is present, the distal   end of the tube enters the stomach in the left upper quadrant.    Hazy appearance the lung bases likely related to a combination of   vascular shadows and overlying soft tissues. However recommend a   follow-up study, PA and lateral views of the chest to better define the   lung bases.    Heart size upper limits of normal magnified secondary to portable   technique. No acute osseous abnormalities.    IMPRESSION: See above report    LETY LONDONO M.D.,ATTENDING RADIOLOGIST  This document has been electronically signed. May 12 2019 12:53PM      < end of copied text >    < from: CT Abdomen and Pelvis No Cont (05.10.19 @ 22:58) >    EXAM:  CT ABDOMEN AND PELVIS                            PROCEDURE DATE:  05/10/2019          INTERPRETATION:  Abdominal/Pelvic CT     Indication: Severe abdominal pain      Technique:  Axial images were obtained from lung bases through pubic   symphysis without contrast.  Reformatted coronal and sagittal images were   submitted.    COMPARISON: None    FINDINGS:    Lung bases:  There are no pleural effusions. None coronary   atherosclerosis.  Peritoneum: Small amount of free intraperitoneal air.  Evaluation of solid abdominal organ is diminished without IV contrast.    Liver: Unremarkable.  Spleen: Unremarkable.  Gallbladder: Unremarkable.  Biliary tree: Unremarkable.  Pancreas: Unremarkable.  Adrenal glands: Unremarkable.    Kidneys: No perinephric stranding, renal edema, hydronephrosis,   obstructing stone, or intrarenal stone.    Urinary bladder: Incompletely distended.    Pelvic organs: No pelvic masses.    Bowel: The stomach is incompletely distended. There is a possible   ulceration in the greater antrum suspicious for a gastric ulcer. There   are foci of free intraperitoneal air around the falciform ligament and   periumbilical region. There is no small bowel obstruction.  Appendix is   unremarkable.    Vasculature: Aorta is not dilated. There is mild atherosclerotic vascular   calcification.  There is no significant adenopathy.  Bones: Chronic degenerative changes of the spine.  Subcutaneous tissues: Umbilical hernia contains fat. Supraumbilical   hernia contains fluid.    IMPRESSION:    Small amount of free intraperitoneal air related to a perforated gastric   antral ulcer.  Findings discussed with Dr. Sorenson at 11:35 pm on 5/10/19   with RBV.  Supraumbilical hernia contains fluid and umbilical hernia contains fat.    ESHA MCMULLEN M.D, ATTENDING RADIOLOGIST  This document has been electronically signed. May 10 2019 11:35PM      < end of copied text >

## 2019-05-20 NOTE — PROGRESS NOTE ADULT - ATTENDING COMMENTS
Patient seen and evaluated   X rays reviewed   Recommend MRI to r/o OM   Patient wound cleaned with NS and dressed with silver alginate DSD   Continue local wound care bedside   Podiatry will continue to follow while in house   Patient to continue local wound care when discharged and follow up in wound care clinic within one week of discharge   Patient can weight bear as tolerated     Wound Care instructions   Clean wound with NS   Apply silver alginate to wound with Gauze and paul   Change every other day. Patient seen and evaluated   X rays reviewed   Recommend MRI to r/o OM patient refuses   Informed patient on the importance of an MRI to r/o OM of hallux   Patient demonstrated verbal understanding   Continue abx based on ID recommendation   Patient wound cleaned with NS and dressed with silver alginate DSD   Continue local wound care bedside   Podiatry will continue to follow while in house   Patient to continue local wound care when discharged and follow up in wound care clinic within one week of discharge   Patient can weight bear as tolerated     Wound Care instructions   Clean wound with NS   Apply silver alginate to wound with Gauze and paul   Change every other day.

## 2019-05-20 NOTE — PROGRESS NOTE ADULT - ASSESSMENT
48 year old male with HTN and DM2, who presented with Perforated Antral Gastric Ulcer, POD 5 s/p Emergent Exp-lap Oomentopexy and plication. He had an RRT for atrial fibrillation with rapid ventricular response, which is new. He has had difficult to control periods of tachycardia and bradycardia, currently in a sinus rhythm this morning   He had an elevated troponin possibly suggestive of non-ST segment elevation myocardial infarction/demand ischemia. He has no known history of coronary artery disease    s/p omentopexy and plication of gastric ulcer POD #9  - Tolerated procedure well, however, had Afib postop  - Pain control  - Ambulate with PT    Afib  - His Afib was short -lived.  He converted to NSR and remained in NSR  - Cont PO Amio load for a total of 5Gm, then 200 mg daily.  We are hoping that now that his acute process if resolved, he could maintain NSR and we would be able to stop Amio, given his age  - Now off Cardizem and digoxin  - We can  start BB and/or CCB once off Amio  - TTE with normal lv function, moderate mr   - Continue Lovenox for now, though, Afib has not recurred.  We will decide regarding long term ac as we go.  Given that he has been electrically quiet, we may be able to avoid long term AC  - Monitor s/s bleeding  - Watch creatinine and electrolytes. Keep K>4, Mg>2  - TSH normal  - If no recurrence of Afib while inpatient, we recommend an event monitor as outpatient.    - Will need ischemia evaluation in future once stable  - Will follow with you.    Henrietta Kim NP  Cardiology 48 year old male with HTN and DM2, who presented with Perforated Antral Gastric Ulcer, POD 5 s/p Emergent Exp-lap Oomentopexy and plication. He had an RRT for atrial fibrillation with rapid ventricular response, which is new. He has had difficult to control periods of tachycardia and bradycardia, currently in a sinus rhythm this morning   He had an elevated troponin possibly suggestive of non-ST segment elevation myocardial infarction/demand ischemia. He has no known history of coronary artery disease    s/p omentopexy and plication of gastric ulcer POD #9  - Tolerated procedure well, however, had Afib postop  - Pain control  - Ambulate with PT    Afib  - His Afib was short -lived.  He converted to NSR and remained in NSR  - Cont PO Amio load for a total of 5Gm, then 200 mg daily.  We are hoping that now that his acute process if resolved, he could maintain NSR and we would be able to stop Amio, given his age, as an outpt.   - Now off Cardizem and digoxin  - We can  start BB once off Amio as outpatient   - TTE with normal lv function, moderate mr   - Continue Lovenox for now, though, Afib has not recurred.  Likely given his recent sig GI surgery and he is rhythm controlled on Amio without reoccurrence of Af i would defer long term AC.   - Monitor s/s bleeding  - Watch creatinine and electrolytes. Keep K>4, Mg>2  - TSH normal  - If no recurrence of Afib while inpatient, we recommend an event monitor as outpatient.    - Will need ischemia evaluation in future once stable  - Will follow with you.    Henrietta Kim NP  Cardiology

## 2019-05-21 DIAGNOSIS — E11.65 TYPE 2 DIABETES MELLITUS WITH HYPERGLYCEMIA: ICD-10-CM

## 2019-05-21 PROCEDURE — 99232 SBSQ HOSP IP/OBS MODERATE 35: CPT

## 2019-05-21 RX ORDER — AMIODARONE HYDROCHLORIDE 400 MG/1
1 TABLET ORAL
Qty: 30 | Refills: 0
Start: 2019-05-21 | End: 2019-06-19

## 2019-05-21 RX ADMIN — ENOXAPARIN SODIUM 80 MILLIGRAM(S): 100 INJECTION SUBCUTANEOUS at 05:15

## 2019-05-21 RX ADMIN — ENOXAPARIN SODIUM 80 MILLIGRAM(S): 100 INJECTION SUBCUTANEOUS at 17:38

## 2019-05-21 RX ADMIN — PANTOPRAZOLE SODIUM 40 MILLIGRAM(S): 20 TABLET, DELAYED RELEASE ORAL at 05:15

## 2019-05-21 RX ADMIN — TAMSULOSIN HYDROCHLORIDE 0.8 MILLIGRAM(S): 0.4 CAPSULE ORAL at 22:19

## 2019-05-21 RX ADMIN — Medication 1 PATCH: at 19:00

## 2019-05-21 RX ADMIN — Medication 4: at 17:37

## 2019-05-21 RX ADMIN — Medication 2: at 12:26

## 2019-05-21 RX ADMIN — Medication 2: at 22:48

## 2019-05-21 RX ADMIN — Medication 2: at 08:11

## 2019-05-21 RX ADMIN — AMIODARONE HYDROCHLORIDE 200 MILLIGRAM(S): 400 TABLET ORAL at 05:15

## 2019-05-21 RX ADMIN — HYDROMORPHONE HYDROCHLORIDE 0.25 MILLIGRAM(S): 2 INJECTION INTRAMUSCULAR; INTRAVENOUS; SUBCUTANEOUS at 22:49

## 2019-05-21 RX ADMIN — Medication 1 PATCH: at 12:25

## 2019-05-21 RX ADMIN — PANTOPRAZOLE SODIUM 40 MILLIGRAM(S): 20 TABLET, DELAYED RELEASE ORAL at 17:38

## 2019-05-21 RX ADMIN — Medication 1 PATCH: at 08:12

## 2019-05-21 RX ADMIN — Medication 1 PATCH: at 12:27

## 2019-05-21 NOTE — PROGRESS NOTE ADULT - PROBLEM SELECTOR PLAN 2
on po amio  given age, hopefully can relegate to relatively short course  on lovenox, long term full AC may not be necessary

## 2019-05-21 NOTE — PROGRESS NOTE ADULT - SUBJECTIVE AND OBJECTIVE BOX
STATUS POST:  omentopexy, plication of gastric ulcer    POST OPERATIVE DAY #: 10    SUBJECTIVE:  Patient seen and examined at bedside.  No overnight events.  Patient with no new complaints at this time, tolerating regular diet, admits to flatus and bowel movement.  Patient denies any fevers, chills, chest pain, shortness of breath, abdominal pain, nausea, vomiting or diarrhea.    Vital Signs Last 24 Hrs  T(C): 36.8 (21 May 2019 11:35), Max: 37.6 (21 May 2019 08:39)  T(F): 98.3 (21 May 2019 11:35), Max: 99.7 (21 May 2019 08:39)  HR: 78 (21 May 2019 11:35) (63 - 78)  BP: 148/70 (21 May 2019 11:35) (120/73 - 154/78)  BP(mean): --  RR: 20 (21 May 2019 08:39) (18 - 20)  SpO2: 95% (21 May 2019 11:35) (94% - 97%)    PHYSICAL EXAM:  GENERAL: No acute distress, well-developed  HEAD:  Atraumatic, Normocephalic  ABDOMEN: Soft, non-tender, non-distended; normal bowel sounds, midline incision healing well without erythema, swelling or discharge, steris on top. JO port site on left abdomen healing well with no erythema or discharge, covered with steri-strips.  NEUROLOGY: A&O x 3, no focal deficits    I&O's Summary    20 May 2019 07:01  -  21 May 2019 07:00  --------------------------------------------------------  IN: 960 mL / OUT: 3675 mL / NET: -2715 mL      I&O's Detail    20 May 2019 07:01  -  21 May 2019 07:00  --------------------------------------------------------  IN:    Oral Fluid: 960 mL  Total IN: 960 mL    OUT:    Indwelling Catheter - Urethral: 3675 mL  Total OUT: 3675 mL    Total NET: -2715 mL        MEDICATIONS  (STANDING):  amiodarone    Tablet   Oral   amiodarone    Tablet 200 milliGRAM(s) Oral daily  chlorhexidine 2% Cloths 1 Application(s) Topical daily  dextrose 5%. 1000 milliLiter(s) (50 mL/Hr) IV Continuous <Continuous>  dextrose 50% Injectable 12.5 Gram(s) IV Push once  dextrose 50% Injectable 25 Gram(s) IV Push once  dextrose 50% Injectable 25 Gram(s) IV Push once  enoxaparin Injectable 80 milliGRAM(s) SubCutaneous two times a day  insulin lispro (HumaLOG) corrective regimen sliding scale   SubCutaneous Before meals and at bedtime  nicotine -  14 mG/24Hr(s) Patch 1 patch Transdermal daily  pantoprazole    Tablet 40 milliGRAM(s) Oral two times a day  tamsulosin 0.8 milliGRAM(s) Oral at bedtime    MEDICATIONS  (PRN):  acetaminophen   Tablet .. 650 milliGRAM(s) Oral every 6 hours PRN Mild Pain (1 - 3)  dextrose 40% Gel 15 Gram(s) Oral once PRN Blood Glucose LESS THAN 70 milliGRAM(s)/deciliter  glucagon  Injectable 1 milliGRAM(s) IntraMuscular once PRN Glucose LESS THAN 70 milligrams/deciliter  HYDROmorphone  Injectable 0.25 milliGRAM(s) IV Push every 4 hours PRN Severe Pain (7 - 10)  oxyCODONE    IR 5 milliGRAM(s) Oral every 6 hours PRN Moderate Pain (4 - 6)    LABS:    RADIOLOGY & ADDITIONAL STUDIES:    ASSESSMENT    48y Male POD 10 s/p omentopexy, plication of gastric ulcer awaiting approval from insurance to go to rehab facility, currently doing well and tolerating diet.    PLAN  - Discussed with Dr. Mejia  - patient currently awaiting approval from insurance for d/c to rehab  - [facility] to see patient as outpatient after rehab to address the jacques,   - Cardio recommendations appreciated, endocrine recommendations appreciated.  - Regular diet  - OOB as tolerated    Surgical Team Contact Information  Spectralink: Ext: [phone] or [phone]  Pager: [phone]

## 2019-05-21 NOTE — PROGRESS NOTE ADULT - SUBJECTIVE AND OBJECTIVE BOX
Patient is a 48y old  Male who presents with a chief complaint of perforated stomach ulcer (21 May 2019 08:37)  feels well presently, without complaints      INTERVAL HPI/OVERNIGHT EVENTS:  T(C): 37.6 (05-21-19 @ 08:39), Max: 37.6 (05-21-19 @ 08:39)  HR: 64 (05-21-19 @ 08:39) (63 - 87)  BP: 123/71 (05-21-19 @ 08:39) (120/73 - 154/78)  RR: 20 (05-21-19 @ 08:39) (18 - 20)  SpO2: 95% (05-21-19 @ 08:39) (94% - 97%)  Wt(kg): --  I&O's Summary    20 May 2019 07:01  -  21 May 2019 07:00  --------------------------------------------------------  IN: 960 mL / OUT: 3675 mL / NET: -2715 mL        LABS:              CAPILLARY BLOOD GLUCOSE      POCT Blood Glucose.: 185 mg/dL (21 May 2019 07:50)  POCT Blood Glucose.: 251 mg/dL (20 May 2019 21:47)  POCT Blood Glucose.: 209 mg/dL (20 May 2019 16:55)  POCT Blood Glucose.: 179 mg/dL (20 May 2019 11:35)            MEDICATIONS  (STANDING):  amiodarone    Tablet   Oral   amiodarone    Tablet 200 milliGRAM(s) Oral daily  chlorhexidine 2% Cloths 1 Application(s) Topical daily  dextrose 5%. 1000 milliLiter(s) (50 mL/Hr) IV Continuous <Continuous>  dextrose 50% Injectable 12.5 Gram(s) IV Push once  dextrose 50% Injectable 25 Gram(s) IV Push once  dextrose 50% Injectable 25 Gram(s) IV Push once  enoxaparin Injectable 80 milliGRAM(s) SubCutaneous two times a day  insulin lispro (HumaLOG) corrective regimen sliding scale   SubCutaneous Before meals and at bedtime  nicotine -  14 mG/24Hr(s) Patch 1 patch Transdermal daily  pantoprazole    Tablet 40 milliGRAM(s) Oral two times a day  tamsulosin 0.8 milliGRAM(s) Oral at bedtime    MEDICATIONS  (PRN):  acetaminophen   Tablet .. 650 milliGRAM(s) Oral every 6 hours PRN Mild Pain (1 - 3)  dextrose 40% Gel 15 Gram(s) Oral once PRN Blood Glucose LESS THAN 70 milliGRAM(s)/deciliter  glucagon  Injectable 1 milliGRAM(s) IntraMuscular once PRN Glucose LESS THAN 70 milligrams/deciliter  HYDROmorphone  Injectable 0.25 milliGRAM(s) IV Push every 4 hours PRN Severe Pain (7 - 10)  oxyCODONE    IR 5 milliGRAM(s) Oral every 6 hours PRN Moderate Pain (4 - 6)      REVIEW OF SYSTEMS:  CONSTITUTIONAL: No fever, weight loss, or fatigue  EYES: No eye pain, visual disturbances, or discharge  ENMT:  No difficulty hearing, tinnitus, vertigo; No sinus or throat pain  NECK: No pain or stiffness  RESPIRATORY: No cough, wheezing, chills or hemoptysis; No shortness of breath  CARDIOVASCULAR: No chest pain, palpitations, dizziness, or leg swelling  GASTROINTESTINAL: abd pain much improved  GENITOURINARY: No dysuria, frequency, hematuria, or incontinence  NEUROLOGICAL: No headaches, memory loss, loss of strength, numbness, or tremors  SKIN: No itching, burning, rashes, or lesions   LYMPH NODES: No enlarged glands  ENDOCRINE: No heat or cold intolerance; No hair loss  MUSCULOSKELETAL: No joint pain or swelling; No muscle, back, or extremity pain  PSYCHIATRIC: No depression, anxiety, mood swings, or difficulty sleeping  HEME/LYMPH: No easy bruising, or bleeding gums  ALLERY AND IMMUNOLOGIC: No hives or eczema    RADIOLOGY & ADDITIONAL TESTS:    Imaging Personally Reviewed:  [ ] YES  [ ] NO    Consultant(s) Notes Reviewed:  [ ] YES  [ ] NO    PHYSICAL EXAM:  GENERAL: NAD,   HEAD:  Atraumatic, Normocephalic  EYES: EOMI, PERRLA, conjunctiva and sclera clear  ENMT: No tonsillar erythema, exudates, or enlargement; Moist mucous membranes, edentulous  NECK: Supple, No JVD, Normal thyroid  NERVOUS SYSTEM:  Alert & Oriented X3, Good concentration; Motor Strength 5/5 B/L upper and lower extremities; DTRs 2+ intact and symmetric  CHEST/LUNG: Clear to percussion bilaterally; No rales, rhonchi, wheezing, or rubs  HEART: Regular rate and rhythm; No murmurs, rubs, or gallops  ABDOMEN: Soft, Nontender, Nondistended; Bowel sounds present  EXTREMITIES:  2+ Peripheral Pulses, No clubbing, cyanosis, or edema  LYMPH: No lymphadenopathy noted  SKIN: No rashes or lesions    Care Discussed with Consultants/Other Providers [ ] YES  [ ] NO

## 2019-05-21 NOTE — PROGRESS NOTE ADULT - SUBJECTIVE AND OBJECTIVE BOX
[facility] -- Bolivar Carbajal, El, Brittany, Reynaldo Sweeney Savella  Office # [phone]    Follow Up: Preop Eval, PAF     Subjective/Observations: Seen and evaluated, comfortable on RA.  No overnight events.  No c/o af any form of bleeding    REVIEW OF SYSTEMS: All other review of systems is negative unless indicated above    PAST MEDICAL & SURGICAL HISTORY:  Diabetes mellitus with no complication  No significant past surgical history    MEDICATIONS  (STANDING):  amiodarone    Tablet   Oral   amiodarone    Tablet 200 milliGRAM(s) Oral daily  chlorhexidine 2% Cloths 1 Application(s) Topical daily  dextrose 5%. 1000 milliLiter(s) (50 mL/Hr) IV Continuous <Continuous>  dextrose 50% Injectable 12.5 Gram(s) IV Push once  dextrose 50% Injectable 25 Gram(s) IV Push once  dextrose 50% Injectable 25 Gram(s) IV Push once  enoxaparin Injectable 80 milliGRAM(s) SubCutaneous two times a day  insulin lispro (HumaLOG) corrective regimen sliding scale   SubCutaneous Before meals and at bedtime  nicotine -  14 mG/24Hr(s) Patch 1 patch Transdermal daily  pantoprazole    Tablet 40 milliGRAM(s) Oral two times a day  tamsulosin 0.8 milliGRAM(s) Oral at bedtime    MEDICATIONS  (PRN):  acetaminophen   Tablet .. 650 milliGRAM(s) Oral every 6 hours PRN Mild Pain (1 - 3)  dextrose 40% Gel 15 Gram(s) Oral once PRN Blood Glucose LESS THAN 70 milliGRAM(s)/deciliter  glucagon  Injectable 1 milliGRAM(s) IntraMuscular once PRN Glucose LESS THAN 70 milligrams/deciliter  HYDROmorphone  Injectable 0.25 milliGRAM(s) IV Push every 4 hours PRN Severe Pain (7 - 10)  oxyCODONE    IR 5 milliGRAM(s) Oral every 6 hours PRN Moderate Pain (4 - 6)    Allergies    No Known Allergies    Intolerances    Vital Signs Last 24 Hrs  T(C): 36.7 (21 May 2019 04:12), Max: 37.4 (20 May 2019 12:04)  T(F): 98.1 (21 May 2019 04:12), Max: 99.4 (20 May 2019 15:47)  HR: 63 (21 May 2019 04:12) (63 - 87)  BP: 154/78 (21 May 2019 04:12) (120/73 - 154/78)  BP(mean): --  RR: 18 (21 May 2019 04:12) (18 - 20)  SpO2: 95% (21 May 2019 04:12) (94% - 97%)    I&O's Summary    20 May 2019 07:01  -  21 May 2019 07:00  --------------------------------------------------------  IN: 960 mL / OUT: 3675 mL / NET: -2715 mL    PHYSICAL EXAM:  TELE: NSR  Constitutional: NAD, awake and alert, obese  HEENT: Moist Mucous Membranes, Anicteric  Pulmonary: Non-labored, breath sounds are diminished at bases bilaterally, No wheezing, rales or rhonchi  Cardiovascular: Regular, S1 and S2, No murmurs, rubs, gallops or clicks  Gastrointestinal: Bowel Sounds present, soft, nontender.   Lymph: 1+ BLE L>R edema. No lymphadenopathy.  Skin: No visible rashes or ulcers.  Midabdominal incision approximated  Psych:  Mood & affect appropriate    LABS: All Labs Reviewed:                        10.1 9.24  )-----------( 288      ( 19 May 2019 08:14 )             30.4     19 May 2019 08:14    142    |  108    |  21     ----------------------------<  170    3.9     |  26     |  1.30     Ca    8.1        19 May 2019 08:14  Phos  2.9       19 May 2019 08:14  Mg     1.8       19 May 2019 08:14    < from: TTE Echo Doppler w/o Cont (05.16.19 @ 09:45) >     EXAM:  ECHO TTE WO CON COMP W DOPPLR         PROCEDURE DATE:  05/16/2019        INTERPRETATION:  INDICATION: Elevated troponin    Blood Pressure 137/62    Height 187.96 cm     Weight 81.6 kg       BSA   2.1 sq m    Dimensions:    LA 4.1       Normal Values: 2.0 - 4.0 cm    Ao 3.6        Normal Values: 2.0 - 3.8 cm  SEPTUM 1.3       Normal Values: 0.6 - 1.2 cm  PWT 1.3       Normal Values: 0.6 - 1.1 cm  LVIDd 6.0         Normal Values: 3.0 - 5.6 cm  LVIDs 3.1         Normal Values: 1.8 - 4.0 cm    OBSERVATIONS:    Mitral Valve: Thickened leaflets, moderate MR.  Aortic Valve/Aorta: Sclerotic trileaflet aortic valve with normal   opening. Trace AI  Tricuspid Valve: normal with mild TR.  Pulmonic Valve: normal  Left Atrium: Mildly dilated  Right Atrium: normal  Left Ventricle: normal LV size and systolic function, estimated LVEF of   65%. No evidence of segmental dysfunction. Basal septal hypertrophy is   present  Right Ventricle: normal size and systolic function.  Pericardium/Pleura: normal, no significant pericardial effusion.    Conclusion:     normal LV size and systolic function, estimated LVEF of 65%. No evidence   of segmental dysfunction  Normal RV size and systolic function.   Mild left atrial enlargement  Sclerotic trileaflet aortic valve with normal opening. Trace AI  Mitral valve with thickened leaflets and moderate MR  Mild TR  No significant pericardial effusion.      MIROSLAVA CASTAÑEDA   This document has been electronically signed. May 17 2019  7:45AM     < end of copied text >    < from: Xray Chest 1 View- PORTABLE-Routine (05.12.19 @ 05:45) >    EXAM:  XR CHEST PORTABLE ROUTINE 1V                          PROCEDURE DATE:  05/12/2019      INTERPRETATION:  Clinical information: Productive cough    Portable study, 5:39 AM    Comparison study dated 5/10/2019, 10:29 PM.    Cardiac monitorleads present. A nasogastric tube is present, the distal   end of the tube enters the stomach in the left upper quadrant.    Hazy appearance the lung bases likely related to a combination of   vascular shadows and overlying soft tissues. However recommend a   follow-up study, PA and lateral views of the chest to better define the   lung bases.    Heart size upper limits of normal magnified secondary to portable   technique. No acute osseous abnormalities.    IMPRESSION: See above report    LETY LONDONO M.D.,ATTENDING RADIOLOGIST  This document has been electronically signed. May 12 2019 12:53PM      < end of copied text >    < from: CT Abdomen and Pelvis No Cont (05.10.19 @ 22:58) >    EXAM:  CT ABDOMEN AND PELVIS                            PROCEDURE DATE:  05/10/2019          INTERPRETATION:  Abdominal/Pelvic CT     Indication: Severe abdominal pain      Technique:  Axial images were obtained from lung bases through pubic   symphysis without contrast.  Reformatted coronal and sagittal images were   submitted.    COMPARISON: None    FINDINGS:    Lung bases:  There are no pleural effusions. None coronary   atherosclerosis.  Peritoneum: Small amount of free intraperitoneal air.  Evaluation of solid abdominal organ is diminished without IV contrast.    Liver: Unremarkable.  Spleen: Unremarkable.  Gallbladder: Unremarkable.  Biliary tree: Unremarkable.  Pancreas: Unremarkable.  Adrenal glands: Unremarkable.    Kidneys: No perinephric stranding, renal edema, hydronephrosis,   obstructing stone, or intrarenal stone.    Urinary bladder: Incompletely distended.    Pelvic organs: No pelvic masses.    Bowel: The stomach is incompletely distended. There is a possible   ulceration in the greater antrum suspicious for a gastric ulcer. There   are foci of free intraperitoneal air around the falciform ligament and   periumbilical region. There is no small bowel obstruction.  Appendix is   unremarkable.    Vasculature: Aorta is not dilated. There is mild atherosclerotic vascular   calcification.  There is no significant adenopathy.  Bones: Chronic degenerative changes of the spine.  Subcutaneous tissues: Umbilical hernia contains fat. Supraumbilical   hernia contains fluid.    IMPRESSION:    Small amount of free intraperitoneal air related to a perforated gastric   antral ulcer.  Findings discussed with Dr. Sorenson at 11:35 pm on 5/10/19   with RBV.  Supraumbilical hernia contains fluid and umbilical hernia contains fat.    ESHA MCMULLEN M.D, ATTENDING RADIOLOGIST  This document has been electronically signed. May 10 2019 11:35PM      < end of copied text >

## 2019-05-21 NOTE — PROGRESS NOTE ADULT - ASSESSMENT
48 year old male with HTN and DM2, who presented with Perforated Antral Gastric Ulcer, POD 5 s/p Emergent Exp-lap Oomentopexy and plication. He had an RRT for atrial fibrillation with rapid ventricular response, which is new. He has had difficult to control periods of tachycardia and bradycardia, currently in a sinus rhythm this morning   He had an elevated troponin possibly suggestive of non-ST segment elevation myocardial infarction/demand ischemia. He has no known history of coronary artery disease    s/p omentopexy and plication of gastric ulcer POD #10  - Tolerated procedure well, however, had Afib postop  - Pain control  - Ambulate with PT    Afib  - His Afib was short -lived.  He converted to NSR and remained in NSR  - Cont PO Amio load for a total of 5Gm, then 200 mg daily.  We are hoping that now that his acute process if resolved, he could maintain NSR and we would be able to stop Amio, given his age, as an outpatientt.   - Now off Cardizem and digoxin  - We can  start BB once off Amio as outpatient   - TTE with normal LV function, moderate mr   - Continue Lovenox for now, though, Afib has not recurred.  Likely given his recent sig GI surgery and he is rhythm controlled on Amio without reoccurrence of Af we would defer long term AC.   - Monitor s/s bleeding  - Watch creatinine and electrolytes. Keep K>4, Mg>2  - TSH normal  - If no recurrence of Afib while inpatient, we recommend an event monitor as outpatient.    - Will need ischemia evaluation in future once stable  - Will follow with you.    Henrietta Kim NP  Cardiology 48 year old male with HTN and DM2, who presented with Perforated Antral Gastric Ulcer, POD 5 s/p Emergent Exp-lap Oomentopexy and plication. He had an RRT for atrial fibrillation with rapid ventricular response, which is new. He has had difficult to control periods of tachycardia and bradycardia, currently in a sinus rhythm this morning   He had an elevated troponin possibly suggestive of non-ST segment elevation myocardial infarction/demand ischemia. He has no known history of coronary artery disease    s/p omentopexy and plication of gastric ulcer POD #10  - Tolerated procedure well, however, had Afib postop  - Pain control  - Ambulate with PT    Afib  - His Afib was short -lived.  He converted to NSR and remained in NSR  - Cont PO Amio load for a total of 5Gm, then 200 mg daily.  We are hoping that now that his acute process has resolved, he should maintain NSR and we would be able to stop Amio as an outpatient  - Now off Cardizem and digoxin. would not resume now  - We can  consider BB once off Amio as outpatient   - TTE with normal LV function, moderate mr   - Continue Lovenox for now, though, Afib has not recurred.  Likely given his recent sig GI surgery and he is rhythm controlled on Amio without reoccurrence of Af we would defer long term AC.   - Monitor s/s bleeding  - Watch creatinine and electrolytes. Keep K>4, Mg>2  - TSH normal  - If no recurrence of Afib while inpatient, we recommend an event monitor as outpatient.    - Will need ischemia evaluation in future once stable  - Will follow with you.    Henrietta Kim NP  Cardiology

## 2019-05-22 DIAGNOSIS — R07.9 CHEST PAIN, UNSPECIFIED: ICD-10-CM

## 2019-05-22 PROCEDURE — 99232 SBSQ HOSP IP/OBS MODERATE 35: CPT

## 2019-05-22 PROCEDURE — 93010 ELECTROCARDIOGRAM REPORT: CPT | Mod: 59

## 2019-05-22 RX ORDER — INSULIN GLARGINE 100 [IU]/ML
10 INJECTION, SOLUTION SUBCUTANEOUS AT BEDTIME
Refills: 0 | Status: DISCONTINUED | OUTPATIENT
Start: 2019-05-22 | End: 2019-05-28

## 2019-05-22 RX ORDER — NITROGLYCERIN 6.5 MG
0.4 CAPSULE, EXTENDED RELEASE ORAL
Refills: 0 | Status: DISCONTINUED | OUTPATIENT
Start: 2019-05-22 | End: 2019-05-28

## 2019-05-22 RX ADMIN — Medication 1 PATCH: at 11:23

## 2019-05-22 RX ADMIN — Medication 30 MILLILITER(S): at 13:33

## 2019-05-22 RX ADMIN — ENOXAPARIN SODIUM 80 MILLIGRAM(S): 100 INJECTION SUBCUTANEOUS at 06:24

## 2019-05-22 RX ADMIN — Medication 650 MILLIGRAM(S): at 13:33

## 2019-05-22 RX ADMIN — PANTOPRAZOLE SODIUM 40 MILLIGRAM(S): 20 TABLET, DELAYED RELEASE ORAL at 06:24

## 2019-05-22 RX ADMIN — INSULIN GLARGINE 10 UNIT(S): 100 INJECTION, SOLUTION SUBCUTANEOUS at 23:40

## 2019-05-22 RX ADMIN — ENOXAPARIN SODIUM 80 MILLIGRAM(S): 100 INJECTION SUBCUTANEOUS at 17:14

## 2019-05-22 RX ADMIN — Medication 2: at 08:22

## 2019-05-22 RX ADMIN — Medication 0.4 MILLIGRAM(S): at 12:51

## 2019-05-22 RX ADMIN — HYDROMORPHONE HYDROCHLORIDE 0.25 MILLIGRAM(S): 2 INJECTION INTRAMUSCULAR; INTRAVENOUS; SUBCUTANEOUS at 22:46

## 2019-05-22 RX ADMIN — Medication 650 MILLIGRAM(S): at 14:30

## 2019-05-22 RX ADMIN — PANTOPRAZOLE SODIUM 40 MILLIGRAM(S): 20 TABLET, DELAYED RELEASE ORAL at 17:14

## 2019-05-22 RX ADMIN — Medication 1 PATCH: at 07:15

## 2019-05-22 RX ADMIN — Medication 4: at 23:40

## 2019-05-22 RX ADMIN — Medication 6: at 12:40

## 2019-05-22 RX ADMIN — CHLORHEXIDINE GLUCONATE 1 APPLICATION(S): 213 SOLUTION TOPICAL at 13:36

## 2019-05-22 RX ADMIN — Medication 1 PATCH: at 19:00

## 2019-05-22 RX ADMIN — Medication 4: at 17:14

## 2019-05-22 RX ADMIN — Medication 1 PATCH: at 12:38

## 2019-05-22 RX ADMIN — AMIODARONE HYDROCHLORIDE 200 MILLIGRAM(S): 400 TABLET ORAL at 06:25

## 2019-05-22 RX ADMIN — TAMSULOSIN HYDROCHLORIDE 0.8 MILLIGRAM(S): 0.4 CAPSULE ORAL at 22:46

## 2019-05-22 NOTE — PROGRESS NOTE ADULT - SUBJECTIVE AND OBJECTIVE BOX
[facility] -- Bolivar Carbajal, El, Brittany, Zahra, Román Jacobs  Office # [phone]    Follow Up:  Preop [facility] [place]     Subjective/Observations: Awake and alert, no c/o discomfort overnight.  No tele events overnight    REVIEW OF SYSTEMS: All other review of systems is negative unless indicated above    PAST MEDICAL & SURGICAL HISTORY:  Diabetes mellitus with no complication  No significant past surgical history    MEDICATIONS  (STANDING):  amiodarone    Tablet   Oral   amiodarone    Tablet 200 milliGRAM(s) Oral daily  chlorhexidine 2% Cloths 1 Application(s) Topical daily  dextrose 5%. 1000 milliLiter(s) (50 mL/Hr) IV Continuous <Continuous>  dextrose 50% Injectable 12.5 Gram(s) IV Push once  dextrose 50% Injectable 25 Gram(s) IV Push once  dextrose 50% Injectable 25 Gram(s) IV Push once  enoxaparin Injectable 80 milliGRAM(s) SubCutaneous two times a day  insulin lispro (HumaLOG) corrective regimen sliding scale   SubCutaneous Before meals and at bedtime  nicotine -  14 mG/24Hr(s) Patch 1 patch Transdermal daily  pantoprazole    Tablet 40 milliGRAM(s) Oral two times a day  tamsulosin 0.8 milliGRAM(s) Oral at bedtime    MEDICATIONS  (PRN):  acetaminophen   Tablet .. 650 milliGRAM(s) Oral every 6 hours PRN Mild Pain (1 - 3)  dextrose 40% Gel 15 Gram(s) Oral once PRN Blood Glucose LESS THAN 70 milliGRAM(s)/deciliter  glucagon  Injectable 1 milliGRAM(s) IntraMuscular once PRN Glucose LESS THAN 70 milligrams/deciliter  HYDROmorphone  Injectable 0.25 milliGRAM(s) IV Push every 4 hours PRN Severe Pain (7 - 10)  oxyCODONE    IR 5 milliGRAM(s) Oral every 6 hours PRN Moderate Pain (4 - 6)    Allergies    No Known Allergies    Intolerances    Vital Signs Last 24 Hrs  T(C): 36.8 (22 May 2019 04:35), Max: 37.6 (21 May 2019 08:39)  T(F): 98.2 (22 May 2019 04:35), Max: 99.7 (21 May 2019 08:39)  HR: 61 (22 May 2019 06:21) (58 - 78)  BP: 132/76 (22 May 2019 04:35) (123/71 - 148/70)  BP(mean): --  RR: 18 (22 May 2019 04:35) (18 - 20)  SpO2: 93% (22 May 2019 04:35) (93% - 98%)    I&O's Summary    21 May 2019 07:01  -  22 May 2019 07:00  --------------------------------------------------------  IN: 780 mL / OUT: 2400 mL / NET: -1620 mL    PHYSICAL EXAM:  TELE: NSR  Constitutional: NAD, awake and alert, obese  HEENT: Moist Mucous Membranes, Anicteric  Pulmonary: Non-labored, breath sounds are diminished at bases bilaterally, No wheezing, rales or rhonchi  Cardiovascular: Regular, S1 and S2, No murmurs, rubs, gallops or clicks  Gastrointestinal: Bowel Sounds present, soft, nontender.   Lymph: Trace BLE edema. No lymphadenopathy.  Skin: No visible rashes or ulcers.  Midabdominal incision approximated  Psych:  Mood & affect appropriate      LABS: All Labs Reviewed:                        10.1   9.24  )-----------( 288      ( 19 May 2019 08:14 )             30.4     19 May 2019 08:14    142    |  108    |  21     ----------------------------<  170    3.9     |  26     |  1.30     Ca    8.1        19 May 2019 08:14  Phos  2.9       19 May 2019 08:14  Mg     1.8       19 May 2019 08:14    < from: TTE Echo Doppler w/o Cont (05.16.19 @ 09:45) >     EXAM:  ECHO TTE WO CON COMP W DOPPLR         PROCEDURE DATE:  05/16/2019        INTERPRETATION:  INDICATION: Elevated troponin    Blood Pressure 137/62    Height 187.96 cm     Weight 81.6 kg       BSA   2.1 sq m    Dimensions:    LA 4.1       Normal Values: 2.0 - 4.0 cm    Ao 3.6        Normal Values: 2.0 - 3.8 cm  SEPTUM 1.3       Normal Values: 0.6 - 1.2 cm  PWT 1.3       Normal Values: 0.6 - 1.1 cm  LVIDd 6.0         Normal Values: 3.0 - 5.6 cm  LVIDs 3.1         Normal Values: 1.8 - 4.0 cm    OBSERVATIONS:    Mitral Valve: Thickened leaflets, moderate MR.  Aortic Valve/Aorta: Sclerotic trileaflet aortic valve with normal   opening. Trace AI  Tricuspid Valve: normal with mild TR.  Pulmonic Valve: normal  Left Atrium: Mildly dilated  Right Atrium: normal  Left Ventricle: normal LV size and systolic function, estimated LVEF of   65%. No evidence of segmental dysfunction. Basal septal hypertrophy is   present  Right Ventricle: normal size and systolic function.  Pericardium/Pleura: normal, no significant pericardial effusion.    Conclusion:     normal LV size and systolic function, estimated LVEF of 65%. No evidence   of segmental dysfunction  Normal RV size and systolic function.   Mild left atrial enlargement  Sclerotic trileaflet aortic valve with normal opening. Trace AI  Mitral valve with thickened leaflets and moderate MR  Mild TR  No significant pericardial effusion.      MIROSLAVA CASTAÑEDA   This document has been electronically signed. May 17 2019  7:45AM     < end of copied text >    < from: Xray Chest 1 View- PORTABLE-Routine (05.12.19 @ 05:45) >    EXAM:  XR CHEST PORTABLE ROUTINE 1V                          PROCEDURE DATE:  05/12/2019      INTERPRETATION:  Clinical information: Productive cough    Portable study, 5:39 AM    Comparison study dated 5/10/2019, 10:29 PM.    Cardiac monitorleads present. A nasogastric tube is present, the distal   end of the tube enters the stomach in the left upper quadrant.    Hazy appearance the lung bases likely related to a combination of   vascular shadows and overlying soft tissues. However recommend a   follow-up study, PA and lateral views of the chest to better define the   lung bases.    Heart size upper limits of normal magnified secondary to portable   technique. No acute osseous abnormalities.    IMPRESSION: See above report    LETY LONDONO M.D.,ATTENDING RADIOLOGIST  This document has been electronically signed. May 12 2019 12:53PM      < end of copied text >    < from: CT Abdomen and Pelvis No Cont (05.10.19 @ 22:58) >    EXAM:  CT ABDOMEN AND PELVIS                            PROCEDURE DATE:  05/10/2019          INTERPRETATION:  Abdominal/Pelvic CT     Indication: Severe abdominal pain      Technique:  Axial images were obtained from lung bases through pubic   symphysis without contrast.  Reformatted coronal and sagittal images were   submitted.    COMPARISON: None    FINDINGS:    Lung bases:  There are no pleural effusions. None coronary   atherosclerosis.  Peritoneum: Small amount of free intraperitoneal air.  Evaluation of solid abdominal organ is diminished without IV contrast.    Liver: Unremarkable.  Spleen: Unremarkable.  Gallbladder: Unremarkable.  Biliary tree: Unremarkable.  Pancreas: Unremarkable.  Adrenal glands: Unremarkable.    Kidneys: No perinephric stranding, renal edema, hydronephrosis,   obstructing stone, or intrarenal stone.    Urinary bladder: Incompletely distended.    Pelvic organs: No pelvic masses.    Bowel: The stomach is incompletely distended. There is a possible   ulceration in the greater antrum suspicious for a gastric ulcer. There   are foci of free intraperitoneal air around the falciform ligament and   periumbilical region. There is no small bowel obstruction.  Appendix is   unremarkable.    Vasculature: Aorta is not dilated. There is mild atherosclerotic vascular   calcification.  There is no significant adenopathy.  Bones: Chronic degenerative changes of the spine.  Subcutaneous tissues: Umbilical hernia contains fat. Supraumbilical   hernia contains fluid.    IMPRESSION:    Small amount of free intraperitoneal air related to a perforated gastric   antral ulcer.  Findings discussed with Dr. Sorenson at 11:35 pm on 5/10/19   with RBV.  Supraumbilical hernia contains fluid and umbilical hernia contains fat.    ESHA MCMULLEN M.D, ATTENDING RADIOLOGIST  This document has been electronically signed. May 10 2019 11:35PM      < end of copied text >

## 2019-05-22 NOTE — PROVIDER CONTACT NOTE (OTHER) - ASSESSMENT
pt is alert and oriented, skin warm and dry, sitting in chair in no acute distress.  Assisted back to bed.  VS /66,  HR 66. SR resp 18, O2 sat 93% on room air.

## 2019-05-22 NOTE — PROGRESS NOTE ADULT - ATTENDING COMMENTS
Patient seen and evaluated   X rays reviewed   Recommend MRI to r/o OM patient refuses   Informed patient on the importance of an MRI to r/o OM of hallux   Patient demonstrated verbal understanding   Continue abx based on ID recommendation   Patient wound cleaned with NS and dressed with silver alginate DSD   Continue local wound care bedside   Podiatry will continue to follow while in house   Patient to continue local wound care when discharged and follow up in wound care clinic within one week of discharge   Patient can weight bear as tolerated     Wound Care instructions   Clean wound with NS   Apply silver alginate to wound with Gauze and paul   Change every other day.

## 2019-05-22 NOTE — PROGRESS NOTE ADULT - SUBJECTIVE AND OBJECTIVE BOX
Patient is a 48y old  Male who presents with a chief complaint of perforated stomach ulcer (22 May 2019 10:05)  complains of chest discomfort post PT  ekg nsr no change      INTERVAL HPI/OVERNIGHT EVENTS:  T(C): 36.8 (05-22-19 @ 12:10), Max: 37 (05-21-19 @ 21:00)  HR: 64 (05-22-19 @ 12:10) (58 - 66)  BP: 126/67 (05-22-19 @ 12:10) (123/66 - 137/74)  RR: 19 (05-22-19 @ 12:10) (18 - 19)  SpO2: 94% (05-22-19 @ 12:10) (93% - 98%)  Wt(kg): --  I&O's Summary    21 May 2019 07:01  -  22 May 2019 07:00  --------------------------------------------------------  IN: 780 mL / OUT: 2400 mL / NET: -1620 mL        LABS:              CAPILLARY BLOOD GLUCOSE      POCT Blood Glucose.: 277 mg/dL (22 May 2019 12:04)  POCT Blood Glucose.: 176 mg/dL (22 May 2019 07:48)  POCT Blood Glucose.: 187 mg/dL (21 May 2019 22:28)  POCT Blood Glucose.: 180 mg/dL (21 May 2019 21:06)  POCT Blood Glucose.: 211 mg/dL (21 May 2019 17:08)            MEDICATIONS  (STANDING):  amiodarone    Tablet   Oral   amiodarone    Tablet 200 milliGRAM(s) Oral daily  chlorhexidine 2% Cloths 1 Application(s) Topical daily  dextrose 5%. 1000 milliLiter(s) (50 mL/Hr) IV Continuous <Continuous>  dextrose 50% Injectable 12.5 Gram(s) IV Push once  dextrose 50% Injectable 25 Gram(s) IV Push once  dextrose 50% Injectable 25 Gram(s) IV Push once  enoxaparin Injectable 80 milliGRAM(s) SubCutaneous two times a day  insulin glargine Injectable (LANTUS) 10 Unit(s) SubCutaneous at bedtime  insulin lispro (HumaLOG) corrective regimen sliding scale   SubCutaneous Before meals and at bedtime  nicotine -  14 mG/24Hr(s) Patch 1 patch Transdermal daily  pantoprazole    Tablet 40 milliGRAM(s) Oral two times a day  tamsulosin 0.8 milliGRAM(s) Oral at bedtime    MEDICATIONS  (PRN):  acetaminophen   Tablet .. 650 milliGRAM(s) Oral every 6 hours PRN Mild Pain (1 - 3)  dextrose 40% Gel 15 Gram(s) Oral once PRN Blood Glucose LESS THAN 70 milliGRAM(s)/deciliter  glucagon  Injectable 1 milliGRAM(s) IntraMuscular once PRN Glucose LESS THAN 70 milligrams/deciliter  HYDROmorphone  Injectable 0.25 milliGRAM(s) IV Push every 4 hours PRN Severe Pain (7 - 10)  nitroglycerin     SubLingual 0.4 milliGRAM(s) SubLingual every 5 minutes PRN Chest Pain  oxyCODONE    IR 5 milliGRAM(s) Oral every 6 hours PRN Moderate Pain (4 - 6)      REVIEW OF SYSTEMS:  CONSTITUTIONAL: No fever, weight loss, or fatigue  EYES: No eye pain, visual disturbances, or discharge  ENMT:  No difficulty hearing, tinnitus, vertigo; No sinus or throat pain  NECK: No pain or stiffness  RESPIRATORY: No cough, wheezing, chills or hemoptysis; No shortness of breath  CARDIOVASCULAR: substernal chest discomfort after ambulating w PT, now improving  GASTROINTESTINAL: No abdominal or epigastric pain. No nausea, vomiting, or hematemesis; No diarrhea or constipation. No melena or hematochezia.  GENITOURINARY: No dysuria, frequency, hematuria, or incontinence  NEUROLOGICAL: No headaches, memory loss, loss of strength, numbness, or tremors  SKIN: No itching, burning, rashes, or lesions   LYMPH NODES: No enlarged glands  ENDOCRINE: No heat or cold intolerance; No hair loss  MUSCULOSKELETAL: No joint pain or swelling; No muscle, back, or extremity pain  PSYCHIATRIC: No depression, anxiety, mood swings, or difficulty sleeping  HEME/LYMPH: No easy bruising, or bleeding gums  ALLERY AND IMMUNOLOGIC: No hives or eczema    RADIOLOGY & ADDITIONAL TESTS:    Imaging Personally Reviewed:  [ ] YES  [ ] NO    Consultant(s) Notes Reviewed:  [ ] YES  [ ] NO    PHYSICAL EXAM:  GENERAL: NAD,   HEAD:  Atraumatic, Normocephalic  EYES: EOMI, PERRLA, conjunctiva and sclera clear  ENMT: No tonsillar erythema, exudates, or enlargement; Moist mucous membranes, edentulous  NECK: Supple, No JVD, Normal thyroid  NERVOUS SYSTEM:  Alert & Oriented X3, Good concentration; Motor Strength 5/5 B/L upper and lower extremities; DTRs 2+ intact and symmetric  CHEST/LUNG: Clear to percussion bilaterally; No rales, rhonchi, wheezing, or rubs  HEART: Regular rate and rhythm; No murmurs, rubs, or gallops  ABDOMEN: Soft, Nontender, Nondistended; Bowel sounds present  EXTREMITIES:  2+ Peripheral Pulses, No clubbing, cyanosis, or edema  LYMPH: No lymphadenopathy noted  SKIN: No rashes or lesions    Care Discussed with Consultants/Other Providers [ ] YES  [ ] NO

## 2019-05-22 NOTE — PROGRESS NOTE ADULT - SUBJECTIVE AND OBJECTIVE BOX
CAPILLARY BLOOD GLUCOSE      POCT Blood Glucose.: 176 mg/dL (22 May 2019 07:48)  POCT Blood Glucose.: 187 mg/dL (21 May 2019 22:28)  POCT Blood Glucose.: 180 mg/dL (21 May 2019 21:06)  POCT Blood Glucose.: 211 mg/dL (21 May 2019 17:08)  POCT Blood Glucose.: 187 mg/dL (21 May 2019 11:48)      Vital Signs Last 24 Hrs  T(C): 36.7 (22 May 2019 07:20), Max: 37 (21 May 2019 21:00)  T(F): 98.1 (22 May 2019 07:20), Max: 98.6 (21 May 2019 21:00)  HR: 62 (22 May 2019 07:20) (58 - 78)  BP: 130/71 (22 May 2019 07:20) (124/69 - 148/70)  BP(mean): --  RR: 19 (22 May 2019 07:20) (18 - 19)  SpO2: 94% (22 May 2019 07:20) (93% - 98%)    General: WN/WD NAD  Respiratory: CTA B/L  CV: RRR, S1S2, no murmurs, rubs or gallops  Abdominal: Soft, NT, ND +BS, Last BM  Extremities: No edema, + peripheral pulses             dextrose 40% Gel 15 Gram(s) Oral once PRN  dextrose 50% Injectable 12.5 Gram(s) IV Push once  dextrose 50% Injectable 25 Gram(s) IV Push once  dextrose 50% Injectable 25 Gram(s) IV Push once  glucagon  Injectable 1 milliGRAM(s) IntraMuscular once PRN  insulin lispro (HumaLOG) corrective regimen sliding scale   SubCutaneous Before meals and at bedtime

## 2019-05-22 NOTE — PROGRESS NOTE ADULT - SUBJECTIVE AND OBJECTIVE BOX
Patient is a 48y old  Male who presents with a chief complaint of perforated stomach ulcer (13 May 2019 13:25) seen by podiatry for left foot medial 1st IPJ joint G2 DFU     HPI:  48 year old man, seen at bedside during podiatry rounds for left foot medial hallux G2 DFU. Patient resting in bed conformably in NAD. Patient denies N/F/V/SOB/CP or calf pain at this time. Patient denies any further pedal complaints at this time Patient denies MRI.     ICU Vital Signs Last 24 Hrs  T(C): 36.7 (22 May 2019 07:20), Max: 37 (21 May 2019 21:00)  T(F): 98.1 (22 May 2019 07:20), Max: 98.6 (21 May 2019 21:00)  HR: 62 (22 May 2019 07:20) (58 - 78)  BP: 130/71 (22 May 2019 07:20) (124/69 - 148/70)  BP(mean): --  ABP: --  ABP(mean): --  RR: 19 (22 May 2019 07:20) (18 - 19)  SpO2: 94% (22 May 2019 07:20) (93% - 98%)    MEDICATIONS  (STANDING):  amiodarone    Tablet   Oral   amiodarone    Tablet 200 milliGRAM(s) Oral daily  chlorhexidine 2% Cloths 1 Application(s) Topical daily  dextrose 5%. 1000 milliLiter(s) (50 mL/Hr) IV Continuous <Continuous>  dextrose 50% Injectable 12.5 Gram(s) IV Push once  dextrose 50% Injectable 25 Gram(s) IV Push once  dextrose 50% Injectable 25 Gram(s) IV Push once  enoxaparin Injectable 80 milliGRAM(s) SubCutaneous two times a day  insulin lispro (HumaLOG) corrective regimen sliding scale   SubCutaneous Before meals and at bedtime  nicotine -  14 mG/24Hr(s) Patch 1 patch Transdermal daily  pantoprazole    Tablet 40 milliGRAM(s) Oral two times a day  tamsulosin 0.8 milliGRAM(s) Oral at bedtime    MEDICATIONS  (PRN):  acetaminophen   Tablet .. 650 milliGRAM(s) Oral every 6 hours PRN Mild Pain (1 - 3)  dextrose 40% Gel 15 Gram(s) Oral once PRN Blood Glucose LESS THAN 70 milliGRAM(s)/deciliter  glucagon  Injectable 1 milliGRAM(s) IntraMuscular once PRN Glucose LESS THAN 70 milligrams/deciliter  HYDROmorphone  Injectable 0.25 milliGRAM(s) IV Push every 4 hours PRN Severe Pain (7 - 10)  oxyCODONE    IR 5 milliGRAM(s) Oral every 6 hours PRN Moderate Pain (4 - 6)    Objective   Vascular: DP/PT palpable, CFT<3, Temp gradient warm to cool pedal hair absent, no edema present at this time   Derm: Left great toe medial IPJ DFU down to subcutaneous fat, no prob to bone, periwound erythema decreased no maceration, no drainage, no mal odor, no tracking no tunneling probe to subcutaneous fat   Musc: hammer toe deformity 2-5 with overlapping of digits, pes planus,   Nuero: protective sensation absent

## 2019-05-22 NOTE — PROGRESS NOTE ADULT - ASSESSMENT
48 year old male with HTN and DM2, who presented with Perforated Antral Gastric Ulcer, POD 5 s/p Emergent Exp-lap Oomentopexy and plication. He had an RRT for atrial fibrillation with rapid ventricular response, which is new. He has had difficult to control periods of tachycardia and bradycardia, currently in a sinus rhythm this morning   He had an elevated troponin possibly suggestive of non-ST segment elevation myocardial infarction/demand ischemia. He has no known history of coronary artery disease    s/p omentopexy and plication of gastric ulcer POD #11  - Tolerated procedure well, however, had Afib postop  - Pain control  - Ambulate with PT    Afib  - His Afib was short -lived.  He converted to NSR and remains in NSR  - Continue PO Amio load for a total of 5Gm, then 200 mg daily.  We are hoping that now that his acute process has resolved, he should maintain NSR and we would be able to stop Amio as an outpatient  - Now off Cardizem and digoxin. would not resume now  - We can  consider BB once off Amio as outpatient   - TTE with normal LV function, moderate MR   - Continue Lovenox for now, though, Afib has not recurred.  Likely given his recent sig GI surgery and he is rhythm controlled on Amio without reoccurrence of Afib we would defer long term AC.   - Monitor s/s bleeding  - Watch creatinine and electrolytes. Keep K>4, Mg>2  - TSH normal  - If no recurrence of Afib while inpatient, we recommend an event monitor as outpatient.    - Will need ischemic eval in the future once stable.  He will follow with us as outpatient  - All other workup per Primary  - Will follow with you.    Henrietta Kim [facility]  Cardiology

## 2019-05-22 NOTE — PROGRESS NOTE ADULT - SUBJECTIVE AND OBJECTIVE BOX
Post-op Day # 11  s/p omentopexy, plication of gastric ulcer      SUBJECTIVE:  47 y/o M examined at bedside with no acute overnight events. Pt offers no complaints at this time in NAD. Pt tolerating regular diet, admits to flatus and bowel movement. Patient denies any fevers, chills, chest pain, shortness of breath, abdominal pain, nausea, vomiting or diarrhea.    Vital Signs Last 24 Hrs  T(C): 36.7 (22 May 2019 07:20), Max: 37 (21 May 2019 21:00)  T(F): 98.1 (22 May 2019 07:20), Max: 98.6 (21 May 2019 21:00)  HR: 62 (22 May 2019 07:20) (58 - 78)  BP: 130/71 (22 May 2019 07:20) (124/69 - 148/70)  BP(mean): --  RR: 19 (22 May 2019 07:20) (18 - 19)  SpO2: 94% (22 May 2019 07:20) (93% - 98%)    PHYSICAL EXAM:  GENERAL: No acute distress  CHEST/LUNG: CTABL, no ronchi, rales, or wheezing  HEART: RRR, no MRG  ABDOMEN: Soft, non-tender, non-distended; normal bowel sounds  NEUROLOGY: A&O x 3, no focal deficits    I&O's Summary    21 May 2019 07:01  -  22 May 2019 07:00  --------------------------------------------------------  IN: 780 mL / OUT: 2400 mL / NET: -1620 mL      I&O's Detail    21 May 2019 07:01  -  22 May 2019 07:00  --------------------------------------------------------  IN:    Oral Fluid: 780 mL  Total IN: 780 mL    OUT:    Indwelling Catheter - Urethral: 2400 mL  Total OUT: 2400 mL    Total NET: -1620 mL        MEDICATIONS  (STANDING):  amiodarone    Tablet   Oral   amiodarone    Tablet 200 milliGRAM(s) Oral daily  chlorhexidine 2% Cloths 1 Application(s) Topical daily  dextrose 5%. 1000 milliLiter(s) (50 mL/Hr) IV Continuous <Continuous>  dextrose 50% Injectable 12.5 Gram(s) IV Push once  dextrose 50% Injectable 25 Gram(s) IV Push once  dextrose 50% Injectable 25 Gram(s) IV Push once  enoxaparin Injectable 80 milliGRAM(s) SubCutaneous two times a day  insulin lispro (HumaLOG) corrective regimen sliding scale   SubCutaneous Before meals and at bedtime  nicotine -  14 mG/24Hr(s) Patch 1 patch Transdermal daily  pantoprazole    Tablet 40 milliGRAM(s) Oral two times a day  tamsulosin 0.8 milliGRAM(s) Oral at bedtime    MEDICATIONS  (PRN):  acetaminophen   Tablet .. 650 milliGRAM(s) Oral every 6 hours PRN Mild Pain (1 - 3)  dextrose 40% Gel 15 Gram(s) Oral once PRN Blood Glucose LESS THAN 70 milliGRAM(s)/deciliter  glucagon  Injectable 1 milliGRAM(s) IntraMuscular once PRN Glucose LESS THAN 70 milligrams/deciliter  HYDROmorphone  Injectable 0.25 milliGRAM(s) IV Push every 4 hours PRN Severe Pain (7 - 10)  oxyCODONE    IR 5 milliGRAM(s) Oral every 6 hours PRN Moderate Pain (4 - 6)        ASSESSMENT  47 y/o M POD # 11 s/p omentopexy, plication of gastric ulcer, progressing well awaiting d/c    PLAN  - surgically cleared for d/c; pending insurance approvals  - Case discussed with Dr. Mejia    Surgical Team Contact Information  Spectralink: Ext: [phone] or [phone]  Pager: [phone]

## 2019-05-22 NOTE — CHART NOTE - NSCHARTNOTEFT_GEN_A_CORE
Assessment: 49 y/o male adm with gastric ulcer perforation. Pt sitting up in chair at bedside at time of visit. Pt reports appetite is good, tolerating well.     Factors impacting intake: [ x] none [ ] nausea  [ ] vomiting [ ] diarrhea [ ] constipation  [ ]chewing problems [ ] swallowing issues  [ ] other:     Diet Presciption: Diet, Regular:   Consistent Carbohydrate {Evening Snack}  DASH/TLC {Sodium & Cholesterol Restricted} (05-18-19 @ 07:33)    Intake: 100%    Current Weight: 5/21 179.8# adm 179#  % Weight Change    Pertinent Medications: MEDICATIONS  (STANDING):  amiodarone    Tablet   Oral   amiodarone    Tablet 200 milliGRAM(s) Oral daily  chlorhexidine 2% Cloths 1 Application(s) Topical daily  dextrose 5%. 1000 milliLiter(s) (50 mL/Hr) IV Continuous <Continuous>  dextrose 50% Injectable 12.5 Gram(s) IV Push once  dextrose 50% Injectable 25 Gram(s) IV Push once  dextrose 50% Injectable 25 Gram(s) IV Push once  enoxaparin Injectable 80 milliGRAM(s) SubCutaneous two times a day  insulin glargine Injectable (LANTUS) 10 Unit(s) SubCutaneous at bedtime  insulin lispro (HumaLOG) corrective regimen sliding scale   SubCutaneous Before meals and at bedtime  nicotine -  14 mG/24Hr(s) Patch 1 patch Transdermal daily  pantoprazole    Tablet 40 milliGRAM(s) Oral two times a day  tamsulosin 0.8 milliGRAM(s) Oral at bedtime    MEDICATIONS  (PRN):  acetaminophen   Tablet .. 650 milliGRAM(s) Oral every 6 hours PRN Mild Pain (1 - 3)  aluminum hydroxide/magnesium hydroxide/simethicone Suspension 30 milliLiter(s) Oral every 4 hours PRN Dyspepsia  dextrose 40% Gel 15 Gram(s) Oral once PRN Blood Glucose LESS THAN 70 milliGRAM(s)/deciliter  glucagon  Injectable 1 milliGRAM(s) IntraMuscular once PRN Glucose LESS THAN 70 milligrams/deciliter  HYDROmorphone  Injectable 0.25 milliGRAM(s) IV Push every 4 hours PRN Severe Pain (7 - 10)  nitroglycerin     SubLingual 0.4 milliGRAM(s) SubLingual every 5 minutes PRN Chest Pain  oxyCODONE    IR 5 milliGRAM(s) Oral every 6 hours PRN Moderate Pain (4 - 6)    Pertinent Labs:  05-19 Phos 2.9 mg/dL 05-12 [id number] 8.0 %<H> 05-17 Chol 105 mg/dL LDL 55 mg/dL HDL 22 mg/dL<L> Trig 140 mg/dL     CAPILLARY BLOOD GLUCOSE      POCT Blood Glucose.: 277 mg/dL (22 May 2019 12:04)  POCT Blood Glucose.: 176 mg/dL (22 May 2019 07:48)  POCT Blood Glucose.: 187 mg/dL (21 May 2019 22:28)  POCT Blood Glucose.: 180 mg/dL (21 May 2019 21:06)  POCT Blood Glucose.: 211 mg/dL (21 May 2019 17:08)    Skin: no skin breakdown noted    Estimated Needs:   [x ] no change since previous assessment  [ ] recalculated:     Previous Nutrition Diagnosis:   [ ] Inadequate Energy Intake [ ]Inadequate Oral Intake [ ] Excessive Energy Intake   [ ] Underweight [ ] Increased Nutrient Needs [ ] Overweight/Obesity   [x ] Altered GI Function [ ] Unintended Weight Loss [ ] Food & Nutrition Related Knowledge Deficit [ ] Malnutrition     Nutrition Diagnosis is [ x] ongoing  [ ] resolved [ ] not applicable     New Nutrition Diagnosis: [x ] not applicable       Interventions:   Recommend  [ ] Change Diet To:  [ ] Nutrition Supplement  [ ] Nutrition Support  [ x] Other: continue current diet order.     Monitoring and Evaluation:   [ x] PO intake [ x ] Tolerance to diet prescription [ x ] weights [ x ] labs[ x ] follow up per protocol  [ ] other:

## 2019-05-23 PROCEDURE — 99232 SBSQ HOSP IP/OBS MODERATE 35: CPT

## 2019-05-23 RX ADMIN — HYDROMORPHONE HYDROCHLORIDE 0.25 MILLIGRAM(S): 2 INJECTION INTRAMUSCULAR; INTRAVENOUS; SUBCUTANEOUS at 22:18

## 2019-05-23 RX ADMIN — Medication 2: at 21:24

## 2019-05-23 RX ADMIN — Medication 4: at 12:30

## 2019-05-23 RX ADMIN — HYDROMORPHONE HYDROCHLORIDE 0.25 MILLIGRAM(S): 2 INJECTION INTRAMUSCULAR; INTRAVENOUS; SUBCUTANEOUS at 23:15

## 2019-05-23 RX ADMIN — ENOXAPARIN SODIUM 80 MILLIGRAM(S): 100 INJECTION SUBCUTANEOUS at 06:33

## 2019-05-23 RX ADMIN — Medication 4: at 17:18

## 2019-05-23 RX ADMIN — Medication 1 PATCH: at 19:58

## 2019-05-23 RX ADMIN — CHLORHEXIDINE GLUCONATE 1 APPLICATION(S): 213 SOLUTION TOPICAL at 13:35

## 2019-05-23 RX ADMIN — Medication 1 PATCH: at 11:46

## 2019-05-23 RX ADMIN — Medication 1 PATCH: at 11:47

## 2019-05-23 RX ADMIN — TAMSULOSIN HYDROCHLORIDE 0.8 MILLIGRAM(S): 0.4 CAPSULE ORAL at 21:25

## 2019-05-23 RX ADMIN — INSULIN GLARGINE 10 UNIT(S): 100 INJECTION, SOLUTION SUBCUTANEOUS at 21:25

## 2019-05-23 RX ADMIN — AMIODARONE HYDROCHLORIDE 200 MILLIGRAM(S): 400 TABLET ORAL at 06:34

## 2019-05-23 RX ADMIN — ENOXAPARIN SODIUM 80 MILLIGRAM(S): 100 INJECTION SUBCUTANEOUS at 17:18

## 2019-05-23 RX ADMIN — PANTOPRAZOLE SODIUM 40 MILLIGRAM(S): 20 TABLET, DELAYED RELEASE ORAL at 06:34

## 2019-05-23 RX ADMIN — PANTOPRAZOLE SODIUM 40 MILLIGRAM(S): 20 TABLET, DELAYED RELEASE ORAL at 17:17

## 2019-05-23 NOTE — PROGRESS NOTE ADULT - PROBLEM SELECTOR PLAN 2
on po amio  given age, hopefully can relegate to relatively short course  on lovenox, long term full AC may not be necessary, maintaining NSR

## 2019-05-23 NOTE — PROGRESS NOTE ADULT - SUBJECTIVE AND OBJECTIVE BOX
POST OPERATIVE DAY #: 12  STATUS POST: omentopexy, plication of gastric ulcer    SUBJECTIVE:  47 y/o M seen and examined at bedside with no overnight events.  Patient with no new complaints at this time, he states he is feeling better every day. He is tolerating his regular diet, admits to passing flatus and having bowel movements.  Patient denies any fevers, chills, chest pain, shortness of breath, abdominal pain, nausea, vomiting or diarrhea.    Vital Signs Last 24 Hrs  T(C): 37 (23 May 2019 07:27), Max: 37.2 (22 May 2019 23:15)  T(F): 98.6 (23 May 2019 07:27), Max: 98.9 (22 May 2019 23:15)  HR: 64 (23 May 2019 07:27) (59 - 75)  BP: 115/69 (23 May 2019 07:27) (109/64 - 126/67)  BP(mean): --  RR: 22 (23 May 2019 07:27) (17 - 22)  SpO2: 92% (23 May 2019 07:27) (91% - 97%)    PHYSICAL EXAM:  GENERAL: NAD, well-developed  HEAD:  Atraumatic, Normocephalic  ABDOMEN: Incisions healing well. Soft, non-tender, non-distended; normal bowel sounds x 4 quadrants.  NEUROLOGY: A&O x 3    I&O's Summary    22 May 2019 07:01  -  23 May 2019 07:00  --------------------------------------------------------  IN: 600 mL / OUT: 3400 mL / NET: -2800 mL    23 May 2019 07:01  -  23 May 2019 11:55  --------------------------------------------------------  IN: 360 mL / OUT: 0 mL / NET: 360 mL      I&O's Detail    22 May 2019 07:01  -  23 May 2019 07:00  --------------------------------------------------------  IN:    Oral Fluid: 600 mL  Total IN: 600 mL    OUT:    Indwelling Catheter - Urethral: 3400 mL  Total OUT: 3400 mL    Total NET: -2800 mL      23 May 2019 07:01  -  23 May 2019 11:55  --------------------------------------------------------  IN:    Oral Fluid: 360 mL  Total IN: 360 mL    OUT:  Total OUT: 0 mL    Total NET: 360 mL        MEDICATIONS  (STANDING):  amiodarone    Tablet   Oral   amiodarone    Tablet 200 milliGRAM(s) Oral daily  chlorhexidine 2% Cloths 1 Application(s) Topical daily  dextrose 5%. 1000 milliLiter(s) (50 mL/Hr) IV Continuous <Continuous>  dextrose 50% Injectable 12.5 Gram(s) IV Push once  dextrose 50% Injectable 25 Gram(s) IV Push once  dextrose 50% Injectable 25 Gram(s) IV Push once  enoxaparin Injectable 80 milliGRAM(s) SubCutaneous two times a day  insulin glargine Injectable (LANTUS) 10 Unit(s) SubCutaneous at bedtime  insulin lispro (HumaLOG) corrective regimen sliding scale   SubCutaneous Before meals and at bedtime  nicotine -  14 mG/24Hr(s) Patch 1 patch Transdermal daily  pantoprazole    Tablet 40 milliGRAM(s) Oral two times a day  tamsulosin 0.8 milliGRAM(s) Oral at bedtime    MEDICATIONS  (PRN):  acetaminophen   Tablet .. 650 milliGRAM(s) Oral every 6 hours PRN Mild Pain (1 - 3)  aluminum hydroxide/magnesium hydroxide/simethicone Suspension 30 milliLiter(s) Oral every 4 hours PRN Dyspepsia  dextrose 40% Gel 15 Gram(s) Oral once PRN Blood Glucose LESS THAN 70 milliGRAM(s)/deciliter  glucagon  Injectable 1 milliGRAM(s) IntraMuscular once PRN Glucose LESS THAN 70 milligrams/deciliter  HYDROmorphone  Injectable 0.25 milliGRAM(s) IV Push every 4 hours PRN Severe Pain (7 - 10)  nitroglycerin     SubLingual 0.4 milliGRAM(s) SubLingual every 5 minutes PRN Chest Pain  oxyCODONE    IR 5 milliGRAM(s) Oral every 6 hours PRN Moderate Pain (4 - 6)    ASSESSMENT  47 y/o M POD 11 s/p omentopexy, plication of gastric ulcer, feeling well, tolerating diet, +F, +BM.     PLAN  - incentive spirometer  - pain control  - OOB, ambulate as tolerated  - Regular diet  -Cardio and Endo recs appreciated  -D/c planning [facility] vs home  -F/U outpatient with Dr. Troy for urology/jacques  -Case discussed with Dr. Mejia    Surgical Team Contact Information  Spectralink: Ext: [phone] or [phone]  Pager: [phone]

## 2019-05-23 NOTE — PROGRESS NOTE ADULT - ASSESSMENT
48 year old male with HTN and DM2, who presented with Perforated Antral Gastric Ulcer, POD 5 s/p Emergent Exp-lap Oomentopexy and plication. He had an RRT for atrial fibrillation with rapid ventricular response, which is new. He has had difficult to control periods of tachycardia and bradycardia, currently in a sinus rhythm this morning   He had an elevated troponin possibly suggestive of non-ST segment elevation myocardial infarction/demand ischemia. He has no known history of coronary artery disease    s/p omentopexy and plication of gastric ulcer   - Tolerated procedure well, however, had Afib postop  - Pain control  - Ambulate with PT    Afib  - His Afib was short -lived.  He converted to NSR and remains in NSR  - Continue PO Amio load for a total of 5Gm, then 200 mg daily.  We are hoping that now that his acute process has resolved, he should maintain NSR and we would be able to stop Amio as an outpatient  - Now off Cardizem and digoxin. would not resume now  - We can  consider BB once off Amio as outpatient   - TTE with normal LV function, moderate MR   - Continue Lovenox for now, though, Afib has not recurred.  Likely given his recent sig GI surgery and he is rhythm controlled on Amio without reoccurrence of Afib we would defer long term AC.   - Monitor s/s bleeding  - Watch creatinine and electrolytes. Keep K>4, Mg>2  - TSH normal  - If no recurrence of Afib while inpatient, we recommend an event monitor as outpatient.    - Will need ischemic eval in the future once stable.  He will follow with us as outpatient  - All other workup per Primary  - Will follow with you.    Ed Paula ANP  Cardiology

## 2019-05-23 NOTE — PROGRESS NOTE ADULT - SUBJECTIVE AND OBJECTIVE BOX
[facility] -- Bolivar Carbajal, El, Brittany, Reynaldo Sweeney Savella  Office # [phone]      Follow Up:    PAF  Subjective/Observations:   No events overnight resting comfortably in bed.  No complaints of chest pain, dyspnea, or palpitations reported. No signs of orthopnea or PND.     REVIEW OF SYSTEMS: All other review of systems is negative unless indicated above    PAST MEDICAL & SURGICAL HISTORY:  Diabetes mellitus with no complication  No significant past surgical history      MEDICATIONS  (STANDING):  amiodarone    Tablet   Oral   amiodarone    Tablet 200 milliGRAM(s) Oral daily  chlorhexidine 2% Cloths 1 Application(s) Topical daily  dextrose 5%. 1000 milliLiter(s) (50 mL/Hr) IV Continuous <Continuous>  dextrose 50% Injectable 12.5 Gram(s) IV Push once  dextrose 50% Injectable 25 Gram(s) IV Push once  dextrose 50% Injectable 25 Gram(s) IV Push once  enoxaparin Injectable 80 milliGRAM(s) SubCutaneous two times a day  insulin glargine Injectable (LANTUS) 10 Unit(s) SubCutaneous at bedtime  insulin lispro (HumaLOG) corrective regimen sliding scale   SubCutaneous Before meals and at bedtime  nicotine -  14 mG/24Hr(s) Patch 1 patch Transdermal daily  pantoprazole    Tablet 40 milliGRAM(s) Oral two times a day  tamsulosin 0.8 milliGRAM(s) Oral at bedtime    MEDICATIONS  (PRN):  acetaminophen   Tablet .. 650 milliGRAM(s) Oral every 6 hours PRN Mild Pain (1 - 3)  aluminum hydroxide/magnesium hydroxide/simethicone Suspension 30 milliLiter(s) Oral every 4 hours PRN Dyspepsia  dextrose 40% Gel 15 Gram(s) Oral once PRN Blood Glucose LESS THAN 70 milliGRAM(s)/deciliter  glucagon  Injectable 1 milliGRAM(s) IntraMuscular once PRN Glucose LESS THAN 70 milligrams/deciliter  HYDROmorphone  Injectable 0.25 milliGRAM(s) IV Push every 4 hours PRN Severe Pain (7 - 10)  nitroglycerin     SubLingual 0.4 milliGRAM(s) SubLingual every 5 minutes PRN Chest Pain  oxyCODONE    IR 5 milliGRAM(s) Oral every 6 hours PRN Moderate Pain (4 - 6)      Allergies    No Known Allergies    Intolerances        Vital Signs Last 24 Hrs  T(C): 37 (23 May 2019 07:27), Max: 37.2 (22 May 2019 23:15)  T(F): 98.6 (23 May 2019 07:27), Max: 98.9 (22 May 2019 23:15)  HR: 64 (23 May 2019 07:27) (59 - 75)  BP: 115/69 (23 May 2019 07:27) (109/64 - 126/67)  BP(mean): --  RR: 22 (23 May 2019 07:27) (17 - 22)  SpO2: 92% (23 May 2019 07:27) (91% - 97%)    I&O's Summary    22 May 2019 07:01  -  23 May 2019 07:00  --------------------------------------------------------  IN: 600 mL / OUT: 3400 mL / NET: -2800 mL    23 May 2019 07:01  -  23 May 2019 09:53  --------------------------------------------------------  IN: 360 mL / OUT: 0 mL / NET: 360 mL          PHYSICAL EXAM:  TELE: NSR  Constitutional: NAD, awake and alert, well-developed  HEENT: Moist Mucous Membranes, Anicteric  Pulmonary: Non-labored, breath sounds are clear bilaterally, No wheezing, crackles or rhonchi  Cardiovascular: Regular, S1 and S2 nl, No murmurs, rubs, gallops or clicks  Gastrointestinal: Bowel Sounds present, soft, nontender.   Lymph: No lymphadenopathy. No peripheral edema.  Skin: No visible rashes or ulcers.  Psych:  Mood & affect appropriate    LABS: All Labs Reviewed:             from: TTE Echo Doppler w/o Cont (05.16.19 @ 09:45) >     EXAM:  ECHO TTE WO CON COMP W DOPPLR         PROCEDURE DATE:  05/16/2019        INTERPRETATION:  INDICATION: Elevated troponin    Blood Pressure 137/62    Height 187.96 cm     Weight 81.6 kg       BSA   2.1 sq m    Dimensions:    LA 4.1       Normal Values: 2.0 - 4.0 cm    Ao 3.6        Normal Values: 2.0 - 3.8 cm  SEPTUM 1.3       Normal Values: 0.6 - 1.2 cm  PWT 1.3       Normal Values: 0.6 - 1.1 cm  LVIDd 6.0         Normal Values: 3.0 - 5.6 cm  LVIDs 3.1         Normal Values: 1.8 - 4.0 cm    OBSERVATIONS:    Mitral Valve: Thickened leaflets, moderate MR.  Aortic Valve/Aorta: Sclerotic trileaflet aortic valve with normal   opening. Trace AI  Tricuspid Valve: normal with mild TR.  Pulmonic Valve: normal  Left Atrium: Mildly dilated  Right Atrium: normal  Left Ventricle: normal LV size and systolic function, estimated LVEF of   65%. No evidence of segmental dysfunction. Basal septal hypertrophy is   present  Right Ventricle: normal size and systolic function.  Pericardium/Pleura: normal, no significant pericardial effusion.    Conclusion:     normal LV size and systolic function, estimated LVEF of 65%. No evidence   of segmental dysfunction  Normal RV size and systolic function.   Mild left atrial enlargement  Sclerotic trileaflet aortic valve with normal opening. Trace AI  Mitral valve with thickened leaflets and moderate MR  Mild TR  No significant pericardial effusion.      MIROSLAVA CASTAÑEDA   This document has been electronically signed. May 17 2019  7:45AM     < end of copied text >    < from: Xray Chest 1 View- PORTABLE-Routine (05.12.19 @ 05:45) >    EXAM:  XR CHEST PORTABLE ROUTINE 1V                          PROCEDURE DATE:  05/12/2019      INTERPRETATION:  Clinical information: Productive cough    Portable study, 5:39 AM    Comparison study dated 5/10/2019, 10:29 PM.    Cardiac monitorleads present. A nasogastric tube is present, the distal   end of the tube enters the stomach in the left upper quadrant.    Hazy appearance the lung bases likely related to a combination of   vascular shadows and overlying soft tissues. However recommend a   follow-up study, PA and lateral views of the chest to better define the   lung bases.    Heart size upper limits of normal magnified secondary to portable   technique. No acute osseous abnormalities.    IMPRESSION: See above report    LETY LONDONO M.D.,ATTENDING RADIOLOGIST  This document has been electronically signed. May 12 2019 12:53PM      < end of copied text >    < from: CT Abdomen and Pelvis No Cont (05.10.19 @ 22:58) >    EXAM:  CT ABDOMEN AND PELVIS                            PROCEDURE DATE:  05/10/2019          INTERPRETATION:  Abdominal/Pelvic CT     Indication: Severe abdominal pain      Technique:  Axial images were obtained from lung bases through pubic   symphysis without contrast.  Reformatted coronal and sagittal images were   submitted.    COMPARISON: None    FINDINGS:    Lung bases:  There are no pleural effusions. None coronary   atherosclerosis.  Peritoneum: Small amount of free intraperitoneal air.  Evaluation of solid abdominal organ is diminished without IV contrast.    Liver: Unremarkable.  Spleen: Unremarkable.  Gallbladder: Unremarkable.  Biliary tree: Unremarkable.  Pancreas: Unremarkable.  Adrenal glands: Unremarkable.    Kidneys: No perinephric stranding, renal edema, hydronephrosis,   obstructing stone, or intrarenal stone.    Urinary bladder: Incompletely distended.    Pelvic organs: No pelvic masses.    Bowel: The stomach is incompletely distended. There is a possible   ulceration in the greater antrum suspicious for a gastric ulcer. There   are foci of free intraperitoneal air around the falciform ligament and   periumbilical region. There is no small bowel obstruction.  Appendix is   unremarkable.    Vasculature: Aorta is not dilated. There is mild atherosclerotic vascular   calcification.  There is no significant adenopathy.  Bones: Chronic degenerative changes of the spine.  Subcutaneous tissues: Umbilical hernia contains fat. Supraumbilical   hernia contains fluid.    IMPRESSION:    Small amount of free intraperitoneal air related to a perforated gastric   antral ulcer.  Findings discussed with Dr. Sorenson at 11:35 pm on 5/10/19   with RBV.  Supraumbilical hernia contains fluid and umbilical hernia contains fat.    ESHA MCMULLEN M.D, ATTENDING RADIOLOGIST  This document has been electronically signed. May 10 2019 11:35PM      < end of copied text >             Ed Paula [facility]   Cardiology

## 2019-05-23 NOTE — PROGRESS NOTE ADULT - SUBJECTIVE AND OBJECTIVE BOX
Patient is a 48y old  Male who presents with a chief complaint of perforated stomach ulcer (23 May 2019 09:53)  tolerating po, denies chest pain, denies palpitations      INTERVAL HPI/OVERNIGHT EVENTS:  T(C): 37 (05-23-19 @ 07:27), Max: 37.2 (05-22-19 @ 23:15)  HR: 64 (05-23-19 @ 07:27) (59 - 75)  BP: 115/69 (05-23-19 @ 07:27) (109/64 - 125/73)  RR: 22 (05-23-19 @ 07:27) (17 - 22)  SpO2: 92% (05-23-19 @ 07:27) (91% - 97%)  Wt(kg): --  I&O's Summary    22 May 2019 07:01  -  23 May 2019 07:00  --------------------------------------------------------  IN: 600 mL / OUT: 3400 mL / NET: -2800 mL    23 May 2019 07:01  -  23 May 2019 12:11  --------------------------------------------------------  IN: 360 mL / OUT: 0 mL / NET: 360 mL        LABS:              CAPILLARY BLOOD GLUCOSE      POCT Blood Glucose.: 204 mg/dL (23 May 2019 12:09)  POCT Blood Glucose.: 144 mg/dL (23 May 2019 08:17)  POCT Blood Glucose.: 212 mg/dL (22 May 2019 22:47)  POCT Blood Glucose.: 223 mg/dL (22 May 2019 16:49)            MEDICATIONS  (STANDING):  amiodarone    Tablet   Oral   amiodarone    Tablet 200 milliGRAM(s) Oral daily  chlorhexidine 2% Cloths 1 Application(s) Topical daily  dextrose 5%. 1000 milliLiter(s) (50 mL/Hr) IV Continuous <Continuous>  dextrose 50% Injectable 12.5 Gram(s) IV Push once  dextrose 50% Injectable 25 Gram(s) IV Push once  dextrose 50% Injectable 25 Gram(s) IV Push once  enoxaparin Injectable 80 milliGRAM(s) SubCutaneous two times a day  insulin glargine Injectable (LANTUS) 10 Unit(s) SubCutaneous at bedtime  insulin lispro (HumaLOG) corrective regimen sliding scale   SubCutaneous Before meals and at bedtime  nicotine -  14 mG/24Hr(s) Patch 1 patch Transdermal daily  pantoprazole    Tablet 40 milliGRAM(s) Oral two times a day  tamsulosin 0.8 milliGRAM(s) Oral at bedtime    MEDICATIONS  (PRN):  acetaminophen   Tablet .. 650 milliGRAM(s) Oral every 6 hours PRN Mild Pain (1 - 3)  aluminum hydroxide/magnesium hydroxide/simethicone Suspension 30 milliLiter(s) Oral every 4 hours PRN Dyspepsia  dextrose 40% Gel 15 Gram(s) Oral once PRN Blood Glucose LESS THAN 70 milliGRAM(s)/deciliter  glucagon  Injectable 1 milliGRAM(s) IntraMuscular once PRN Glucose LESS THAN 70 milligrams/deciliter  HYDROmorphone  Injectable 0.25 milliGRAM(s) IV Push every 4 hours PRN Severe Pain (7 - 10)  nitroglycerin     SubLingual 0.4 milliGRAM(s) SubLingual every 5 minutes PRN Chest Pain  oxyCODONE    IR 5 milliGRAM(s) Oral every 6 hours PRN Moderate Pain (4 - 6)      REVIEW OF SYSTEMS:  CONSTITUTIONAL: No fever, weight loss, or fatigue  EYES: No eye pain, visual disturbances, or discharge  ENMT:  No difficulty hearing, tinnitus, vertigo; No sinus or throat pain  NECK: No pain or stiffness  RESPIRATORY: No cough, wheezing, chills or hemoptysis; No shortness of breath  CARDIOVASCULAR: No chest pain, palpitations, dizziness, or leg swelling  GASTROINTESTINAL: No abdominal or epigastric pain. No nausea, vomiting, or hematemesis; No diarrhea or constipation. No melena or hematochezia.  GENITOURINARY: No dysuria, frequency, hematuria, or incontinence  NEUROLOGICAL: No headaches, memory loss, loss of strength, numbness, or tremors  SKIN: No itching, burning, rashes, or lesions   LYMPH NODES: No enlarged glands  ENDOCRINE: No heat or cold intolerance; No hair loss  MUSCULOSKELETAL: No joint pain or swelling; No muscle, back, or extremity pain  PSYCHIATRIC: No depression, anxiety, mood swings, or difficulty sleeping  HEME/LYMPH: No easy bruising, or bleeding gums  ALLERY AND IMMUNOLOGIC: No hives or eczema    RADIOLOGY & ADDITIONAL TESTS:    Imaging Personally Reviewed:  [ ] YES  [ ] NO    Consultant(s) Notes Reviewed:  [ ] YES  [ ] NO    PHYSICAL EXAM:  GENERAL: NAD,   HEAD:  Atraumatic, Normocephalic  EYES: EOMI, PERRLA, conjunctiva and sclera clear  ENMT: No tonsillar erythema, exudates, or enlargement; Moist mucous membranes, edentulous  NECK: Supple, No JVD, Normal thyroid  NERVOUS SYSTEM:  Alert & Oriented X3, Good concentration; Motor Strength 5/5 B/L upper and lower extremities; DTRs 2+ intact and symmetric  CHEST/LUNG: Clear to percussion bilaterally; No rales, rhonchi, wheezing, or rubs  HEART: Regular rate and rhythm; No murmurs, rubs, or gallops  ABDOMEN: Soft, Nontender, Nondistended; Bowel sounds present  EXTREMITIES:  2+ Peripheral Pulses, No clubbing, cyanosis, or edema  LYMPH: No lymphadenopathy noted  SKIN: No rashes or lesions    Care Discussed with Consultants/Other Providers [ ] YES  [ ] NO

## 2019-05-24 LAB
ALBUMIN SERPL ELPH-MCNC: 2.4 G/DL — LOW (ref 3.3–5)
ALP SERPL-CCNC: 94 U/L — SIGNIFICANT CHANGE UP (ref 40–120)
ALT FLD-CCNC: 26 U/L — SIGNIFICANT CHANGE UP (ref 12–78)
AST SERPL-CCNC: 15 U/L — SIGNIFICANT CHANGE UP (ref 15–37)
BILIRUB DIRECT SERPL-MCNC: 0.1 MG/DL — SIGNIFICANT CHANGE UP (ref 0.05–0.2)
BILIRUB INDIRECT FLD-MCNC: 0.1 MG/DL — LOW (ref 0.2–1)
BILIRUB SERPL-MCNC: 0.2 MG/DL — SIGNIFICANT CHANGE UP (ref 0.2–1.2)
PROT SERPL-MCNC: 5.7 G/DL — LOW (ref 6–8.3)

## 2019-05-24 PROCEDURE — 99232 SBSQ HOSP IP/OBS MODERATE 35: CPT

## 2019-05-24 PROCEDURE — 73720 MRI LWR EXTREMITY W/O&W/DYE: CPT | Mod: 26,LT

## 2019-05-24 RX ADMIN — PANTOPRAZOLE SODIUM 40 MILLIGRAM(S): 20 TABLET, DELAYED RELEASE ORAL at 05:41

## 2019-05-24 RX ADMIN — Medication 1 PATCH: at 12:00

## 2019-05-24 RX ADMIN — HYDROMORPHONE HYDROCHLORIDE 0.25 MILLIGRAM(S): 2 INJECTION INTRAMUSCULAR; INTRAVENOUS; SUBCUTANEOUS at 22:41

## 2019-05-24 RX ADMIN — Medication 1 PATCH: at 08:16

## 2019-05-24 RX ADMIN — INSULIN GLARGINE 10 UNIT(S): 100 INJECTION, SOLUTION SUBCUTANEOUS at 22:36

## 2019-05-24 RX ADMIN — ENOXAPARIN SODIUM 80 MILLIGRAM(S): 100 INJECTION SUBCUTANEOUS at 17:21

## 2019-05-24 RX ADMIN — ENOXAPARIN SODIUM 80 MILLIGRAM(S): 100 INJECTION SUBCUTANEOUS at 05:41

## 2019-05-24 RX ADMIN — Medication 2: at 08:15

## 2019-05-24 RX ADMIN — Medication 1 PATCH: at 19:59

## 2019-05-24 RX ADMIN — Medication 4: at 22:35

## 2019-05-24 RX ADMIN — Medication 1 PATCH: at 12:13

## 2019-05-24 RX ADMIN — TAMSULOSIN HYDROCHLORIDE 0.8 MILLIGRAM(S): 0.4 CAPSULE ORAL at 22:36

## 2019-05-24 RX ADMIN — AMIODARONE HYDROCHLORIDE 200 MILLIGRAM(S): 400 TABLET ORAL at 05:41

## 2019-05-24 RX ADMIN — PANTOPRAZOLE SODIUM 40 MILLIGRAM(S): 20 TABLET, DELAYED RELEASE ORAL at 17:21

## 2019-05-24 RX ADMIN — Medication 4: at 17:20

## 2019-05-24 RX ADMIN — Medication 2: at 12:10

## 2019-05-24 RX ADMIN — HYDROMORPHONE HYDROCHLORIDE 0.25 MILLIGRAM(S): 2 INJECTION INTRAMUSCULAR; INTRAVENOUS; SUBCUTANEOUS at 22:53

## 2019-05-24 NOTE — PROGRESS NOTE ADULT - ASSESSMENT
Henrietta Kim DNP  Cardiology · Assessment		  48 year old male with HTN and DM2, who presented with Perforated Antral Gastric Ulcer, POD 5 s/p Emergent Exp-lap Oomentopexy and plication. He had an RRT for atrial fibrillation with rapid ventricular response, which is new. He has had difficult to control periods of tachycardia and bradycardia, currently in a sinus rhythm this morning   He had an elevated troponin possibly suggestive of non-ST segment elevation myocardial infarction/demand ischemia. He has no known history of coronary artery disease    s/p omentopexy and plication of gastric ulcer   - Tolerated procedure well, however, had Afib postop, spontaneously to NSR  - Pain control  - Ambulate with PT    Afib  - His Afib was short -lived.  He converted to NSR and remained in NSR.  Continue monitoring for now for occult Afib  - s/p Amio load.  Continue Amio 200 mg daily.  We are hoping that now that his acute process has resolved, he should maintain NSR and we would be able to stop Amio as an outpatient  - Now off Cardizem and digoxin.  Would not resume now  - We can  consider BB once off Amio as outpatient   - TTE with normal LV function, moderate MR   - Continue Lovenox for now, though, Afib has not recurred.  Likely given his recent sig GI surgery and he is rhythm controlled on Amio without reoccurrence of Afib we would defer long term AC.   - Monitor s/s bleeding  - Watch creatinine and electrolytes. Keep K>4, Mg>2  - TSH normal  - If no recurrence of Afib while inpatient, we recommend an event monitor as outpatient.    - Will need ischemic eval in the future once stable.  He will follow with us as outpatient  - All other workup per Primary  - Awaiting discharge to [facility]  - Will follow with you.    Henrietta Kim [facility]  Cardiology · Assessment		  48 year old male with HTN and DM2, who presented with Perforated Antral Gastric Ulcer, POD 5 s/p Emergent Exp-lap Oomentopexy and plication. He had an RRT for atrial fibrillation with rapid ventricular response, which is new. He has had difficult to control periods of tachycardia and bradycardia, currently in a sinus rhythm this morning   He had an elevated troponin possibly suggestive of non-ST segment elevation myocardial infarction/demand ischemia. He has no known history of coronary artery disease    s/p omentopexy and plication of gastric ulcer   - Tolerated procedure well, however, had Afib postop, spontaneously to NSR  - Pain control  - Ambulate with PT    Afib  - His Afib was short -lived.  He converted to NSR and remained in NSR.  Continue monitoring for now for occult Afib  - s/p Amio load.  Continue Amio 200 mg daily.  We are hoping that now that his acute process has resolved, he should maintain NSR and we would be able to stop Amio as an outpatient  - Now off Cardizem and digoxin.  Would not resume now  - We can  consider BB once off Amio as outpatient   - TTE with normal LV function, moderate MR   - Continue Lovenox for now, though, Afib has not recurred.  Likely given his recent sig GI surgery and he is rhythm controlled on Amio without reoccurrence of Afib we would defer long term AC.   - Monitor s/s bleeding  - Watch creatinine and electrolytes. Keep K>4, Mg>2  - TSH normal  - If no recurrence of Afib while inpatient, we recommend an event monitor as outpatient.  - Follow up LFT's today    - Will need ischemic eval in the future once stable.  He will follow with us as outpatient  - All other workup per Primary  - Awaiting discharge to [facility]  - Will follow with you.    Henrietta Kim DNP  Cardiology 48 year old male with HTN and DM2, who presented with Perforated Antral Gastric Ulcer, POD 5 s/p Emergent Exp-lap Oomentopexy and plication. He had an RRT for atrial fibrillation with rapid ventricular response, which is new. He has had difficult to control periods of tachycardia and bradycardia, currently in a sinus rhythm this morning   He had an elevated troponin possibly suggestive of non-ST segment elevation myocardial infarction/demand ischemia. He has no known history of coronary artery disease    s/p omentopexy and plication of gastric ulcer   - Tolerated procedure well, however, had Afib postop, spontaneously to NSR  - Pain control  - Ambulate with PT    Afib  - His Afib was short -lived.  He converted to NSR and remained in NSR.  Continue monitoring for now for occult Afib  - s/p Amio load. Continue Amio 200 mg daily.  We are hoping that now that his acute process has resolved, he should maintain NSR and we would be able to stop Amio as an outpatient  - Now off Cardizem and digoxin.  Would not resume now  - We can consider BB once off Amio as outpatient   - TTE with normal LV function, moderate MR   - Continue Lovenox for now, though, Afib has not recurred.  Likely given his recent sig GI surgery and he is rhythm controlled on Amio without reoccurrence of Afib we would defer long term AC.   - Monitor s/s bleeding  - Watch creatinine and electrolytes. Keep K>4, Mg>2  - TSH normal  - If no recurrence of Afib while inpatient, we recommend an event monitor as outpatient.  - Follow up LFT's today    - Will need ischemic eval in the future once stable.  He will follow with us as outpatient  - All other workup per Primary  - Awaiting discharge to [facility]  - Will follow with you.    Henrietta Kim [facility]  Cardiology

## 2019-05-24 NOTE — PROGRESS NOTE ADULT - PROBLEM SELECTOR PLAN 1
cont lantus 10 units qhs  cont humalog scale coverage  goal bg 100-180 in hosp setting  cont cons cho diet

## 2019-05-24 NOTE — PROGRESS NOTE ADULT - SUBJECTIVE AND OBJECTIVE BOX
CAPILLARY BLOOD GLUCOSE      POCT Blood Glucose.: 153 mg/dL (24 May 2019 08:14)  POCT Blood Glucose.: 195 mg/dL (23 May 2019 21:19)  POCT Blood Glucose.: 222 mg/dL (23 May 2019 16:46)  POCT Blood Glucose.: 204 mg/dL (23 May 2019 12:09)      Vital Signs Last 24 Hrs  T(C): 36.4 (24 May 2019 05:00), Max: 37.2 (23 May 2019 19:55)  T(F): 97.6 (24 May 2019 05:00), Max: 98.9 (23 May 2019 19:55)  HR: 62 (24 May 2019 05:00) (60 - 67)  BP: 130/74 (24 May 2019 05:00) (113/61 - 131/67)  BP(mean): --  RR: 18 (24 May 2019 05:00) (18 - 18)  SpO2: 95% (24 May 2019 05:00) (90% - 99%)    General: WN/WD NAD  Respiratory: CTA B/L  CV: RRR, S1S2, no murmurs, rubs or gallops  Abdominal: Soft, NT, ND +BS, Last BM  Extremities: No edema, + peripheral pulses         TPro  5.7<L>  /  Alb  2.4<L>  /  TBili  0.2  /  DBili  .10  /  AST  15  /  ALT  26  /  AlkPhos  94  05-24      dextrose 40% Gel 15 Gram(s) Oral once PRN  dextrose 50% Injectable 12.5 Gram(s) IV Push once  dextrose 50% Injectable 25 Gram(s) IV Push once  dextrose 50% Injectable 25 Gram(s) IV Push once  glucagon  Injectable 1 milliGRAM(s) IntraMuscular once PRN  insulin glargine Injectable (LANTUS) 10 Unit(s) SubCutaneous at bedtime  insulin lispro (HumaLOG) corrective regimen sliding scale   SubCutaneous Before meals and at bedtime

## 2019-05-24 NOTE — PROGRESS NOTE ADULT - ATTENDING COMMENTS
Patient seen and evaluated   X rays reviewed   f/u MRI  Recommend MRI to r/o OM   Informed patient on the importance of an MRI to r/o OM of hallux   Patient demonstrated verbal understanding   Continue abx based on ID recommendation   Patient wound cleaned with NS and dressed with silver alginate DSD   Continue local wound care bedside   Podiatry will continue to follow while in house   Patient to continue local wound care when discharged and follow up in wound care clinic within one week of discharge   Patient can weight bear as tolerated     Wound Care instructions   Clean wound with NS   Apply silver alginate to wound with Gauze and paul   Change every other day.

## 2019-05-24 NOTE — PROGRESS NOTE ADULT - SUBJECTIVE AND OBJECTIVE BOX
POST OPERATIVE DAY #: 13  STATUS POST:  omentopexy, plication of gastric ulcer      SUBJECTIVE:  47 y/o M seen and examined at bedside with no overnight events.  Patient with no new complaints at this time, he states he is feeling well today and in no pain. He is tolerating his regular diet, admits to passing flatus and having bowel movements.  Patient denies any fevers, chills, chest pain, shortness of breath, abdominal pain, nausea, vomiting or diarrhea.    Vital Signs Last 24 Hrs  T(C): 36.8 (24 May 2019 11:10), Max: 37.2 (23 May 2019 19:55)  T(F): 98.3 (24 May 2019 11:10), Max: 98.9 (23 May 2019 19:55)  HR: 62 (24 May 2019 11:10) (60 - 67)  BP: 124/76 (24 May 2019 11:10) (113/61 - 131/67)  BP(mean): --  RR: 18 (24 May 2019 11:10) (18 - 18)  SpO2: 95% (24 May 2019 11:10) (90% - 99%)    PHYSICAL EXAM:  GENERAL: No acute distress, well-developed  HEAD:  Atraumatic, Normocephalic  ABDOMEN: Incisions healing well. Soft, non-tender to palpation, non-distended. Normal bowel sounds x 4 quadrants.   NEUROLOGY: A&O x 3    I&O's Summary    23 May 2019 07:01  -  24 May 2019 07:00  --------------------------------------------------------  IN: 600 mL / OUT: 4400 mL / NET: -3800 mL      I&O's Detail    23 May 2019 07:01  -  24 May 2019 07:00  --------------------------------------------------------  IN:    Oral Fluid: 600 mL  Total IN: 600 mL    OUT:    Indwelling Catheter - Urethral: 4400 mL  Total OUT: 4400 mL    Total NET: -3800 mL      MEDICATIONS  (STANDING):  amiodarone    Tablet   Oral   amiodarone    Tablet 200 milliGRAM(s) Oral daily  chlorhexidine 2% Cloths 1 Application(s) Topical daily  dextrose 5%. 1000 milliLiter(s) (50 mL/Hr) IV Continuous <Continuous>  dextrose 50% Injectable 12.5 Gram(s) IV Push once  dextrose 50% Injectable 25 Gram(s) IV Push once  dextrose 50% Injectable 25 Gram(s) IV Push once  enoxaparin Injectable 80 milliGRAM(s) SubCutaneous two times a day  insulin glargine Injectable (LANTUS) 10 Unit(s) SubCutaneous at bedtime  insulin lispro (HumaLOG) corrective regimen sliding scale   SubCutaneous Before meals and at bedtime  nicotine -  14 mG/24Hr(s) Patch 1 patch Transdermal daily  pantoprazole    Tablet 40 milliGRAM(s) Oral two times a day  tamsulosin 0.8 milliGRAM(s) Oral at bedtime    MEDICATIONS  (PRN):  acetaminophen   Tablet .. 650 milliGRAM(s) Oral every 6 hours PRN Mild Pain (1 - 3)  aluminum hydroxide/magnesium hydroxide/simethicone Suspension 30 milliLiter(s) Oral every 4 hours PRN Dyspepsia  dextrose 40% Gel 15 Gram(s) Oral once PRN Blood Glucose LESS THAN 70 milliGRAM(s)/deciliter  glucagon  Injectable 1 milliGRAM(s) IntraMuscular once PRN Glucose LESS THAN 70 milligrams/deciliter  HYDROmorphone  Injectable 0.25 milliGRAM(s) IV Push every 4 hours PRN Severe Pain (7 - 10)  nitroglycerin     SubLingual 0.4 milliGRAM(s) SubLingual every 5 minutes PRN Chest Pain  oxyCODONE    IR 5 milliGRAM(s) Oral every 6 hours PRN Moderate Pain (4 - 6)    LABS:    TPro  5.7<L>  /  Alb  2.4<L>  /  TBili  0.2  /  DBili  .10  /  AST  15  /  ALT  26  /  AlkPhos  94  05-24    ASSESSMENT  47 y/o M POD#13 s/p omentopexy, plication of gastric ulcer, feeling well, tolerating regular diet, +F, +BM    PLAN  - incentive spirometer  - pain control  - OOB, ambulate as tolerated  - Regular diet  -Cardio and endo recs appreciated  -D/C planning to [facility], awaiting placement confirmation  -Follow up outpatient with Dr. Troy for urology and Aguilera management  -Case discussed with Dr. Mejia    Surgical Team Contact Information  Spectralink: Ext: [phone] or [phone]  Pager: [phone]

## 2019-05-24 NOTE — PROGRESS NOTE ADULT - SUBJECTIVE AND OBJECTIVE BOX
[facility] -- Bolivar Carbajal, El, Brittany, Zahra, Román Jacobs  Office # [phone]    Follow Up: PAF, Preop Eval     Subjective/Observations: Awake and alert.  On RA.  No complaints.  Awaiting discharge    REVIEW OF SYSTEMS: All other review of systems is negative unless indicated above    PAST MEDICAL & SURGICAL HISTORY:  Diabetes mellitus with no complication  No significant past surgical history    MEDICATIONS  (STANDING):  amiodarone    Tablet   Oral   amiodarone    Tablet 200 milliGRAM(s) Oral daily  chlorhexidine 2% Cloths 1 Application(s) Topical daily  dextrose 5%. 1000 milliLiter(s) (50 mL/Hr) IV Continuous <Continuous>  dextrose 50% Injectable 12.5 Gram(s) IV Push once  dextrose 50% Injectable 25 Gram(s) IV Push once  dextrose 50% Injectable 25 Gram(s) IV Push once  enoxaparin Injectable 80 milliGRAM(s) SubCutaneous two times a day  insulin glargine Injectable (LANTUS) 10 Unit(s) SubCutaneous at bedtime  insulin lispro (HumaLOG) corrective regimen sliding scale   SubCutaneous Before meals and at bedtime  nicotine -  14 mG/24Hr(s) Patch 1 patch Transdermal daily  pantoprazole    Tablet 40 milliGRAM(s) Oral two times a day  tamsulosin 0.8 milliGRAM(s) Oral at bedtime    MEDICATIONS  (PRN):  acetaminophen   Tablet .. 650 milliGRAM(s) Oral every 6 hours PRN Mild Pain (1 - 3)  aluminum hydroxide/magnesium hydroxide/simethicone Suspension 30 milliLiter(s) Oral every 4 hours PRN Dyspepsia  dextrose 40% Gel 15 Gram(s) Oral once PRN Blood Glucose LESS THAN 70 milliGRAM(s)/deciliter  glucagon  Injectable 1 milliGRAM(s) IntraMuscular once PRN Glucose LESS THAN 70 milligrams/deciliter  HYDROmorphone  Injectable 0.25 milliGRAM(s) IV Push every 4 hours PRN Severe Pain (7 - 10)  nitroglycerin     SubLingual 0.4 milliGRAM(s) SubLingual every 5 minutes PRN Chest Pain  oxyCODONE    IR 5 milliGRAM(s) Oral every 6 hours PRN Moderate Pain (4 - 6)    Allergies    No Known Allergies    Intolerances    Vital Signs Last 24 Hrs  T(C): 36.4 (24 May 2019 05:00), Max: 37.2 (23 May 2019 19:55)  T(F): 97.6 (24 May 2019 05:00), Max: 98.9 (23 May 2019 19:55)  HR: 62 (24 May 2019 05:00) (60 - 67)  BP: 130/74 (24 May 2019 05:00) (113/61 - 131/67)  BP(mean): --  RR: 18 (24 May 2019 05:00) (18 - 18)  SpO2: 95% (24 May 2019 05:00) (90% - 99%)    I&O's Summary    23 May 2019 07:01  -  24 May 2019 07:00  --------------------------------------------------------  IN: 600 mL / OUT: 4400 mL / NET: -3800 mL    PHYSICAL EXAM:  TELE: Sinus johnny  Constitutional: NAD, awake and alert, obese  HEENT: Moist Mucous Membranes, Anicteric  Pulmonary: Non-labored, breath sounds are clear bilaterally, No wheezing, rales or rhonchi  Cardiovascular: Regular, S1 and S2, No murmurs, rubs, gallops or clicks  Gastrointestinal: Bowel Sounds present, soft, nontender.   Lymph: 1+ BLE edema. No lymphadenopathy.  Skin: No visible rashes or left foot ulcer.  Psych:  Mood & affect appropriate    LABS: All Labs Reviewed:  TPro  5.7    /  Alb  2.4    /  TBili  0.2    /  DBili  .10    /  AST  15     /  ALT  26     /  AlkPhos  94     24 May 2019 07:22 [facility] -- Bolivar Carbajal, El, Brittany, Zahra, Román Jacobs  Office # [phone]    Follow Up: PAF, Preop Eval     Subjective/Observations: Awake and alert.  On RA.  No complaints.  Awaiting discharge    REVIEW OF SYSTEMS: All other review of systems is negative unless indicated above    PAST MEDICAL & SURGICAL HISTORY:  Diabetes mellitus with no complication  No significant past surgical history    MEDICATIONS  (STANDING):  amiodarone    Tablet   Oral   amiodarone    Tablet 200 milliGRAM(s) Oral daily  chlorhexidine 2% Cloths 1 Application(s) Topical daily  dextrose 5%. 1000 milliLiter(s) (50 mL/Hr) IV Continuous <Continuous>  dextrose 50% Injectable 12.5 Gram(s) IV Push once  dextrose 50% Injectable 25 Gram(s) IV Push once  dextrose 50% Injectable 25 Gram(s) IV Push once  enoxaparin Injectable 80 milliGRAM(s) SubCutaneous two times a day  insulin glargine Injectable (LANTUS) 10 Unit(s) SubCutaneous at bedtime  insulin lispro (HumaLOG) corrective regimen sliding scale   SubCutaneous Before meals and at bedtime  nicotine -  14 mG/24Hr(s) Patch 1 patch Transdermal daily  pantoprazole    Tablet 40 milliGRAM(s) Oral two times a day  tamsulosin 0.8 milliGRAM(s) Oral at bedtime    MEDICATIONS  (PRN):  acetaminophen   Tablet .. 650 milliGRAM(s) Oral every 6 hours PRN Mild Pain (1 - 3)  aluminum hydroxide/magnesium hydroxide/simethicone Suspension 30 milliLiter(s) Oral every 4 hours PRN Dyspepsia  dextrose 40% Gel 15 Gram(s) Oral once PRN Blood Glucose LESS THAN 70 milliGRAM(s)/deciliter  glucagon  Injectable 1 milliGRAM(s) IntraMuscular once PRN Glucose LESS THAN 70 milligrams/deciliter  HYDROmorphone  Injectable 0.25 milliGRAM(s) IV Push every 4 hours PRN Severe Pain (7 - 10)  nitroglycerin     SubLingual 0.4 milliGRAM(s) SubLingual every 5 minutes PRN Chest Pain  oxyCODONE    IR 5 milliGRAM(s) Oral every 6 hours PRN Moderate Pain (4 - 6)    Allergies    No Known Allergies    Intolerances    Vital Signs Last 24 Hrs  T(C): 36.4 (24 May 2019 05:00), Max: 37.2 (23 May 2019 19:55)  T(F): 97.6 (24 May 2019 05:00), Max: 98.9 (23 May 2019 19:55)  HR: 62 (24 May 2019 05:00) (60 - 67)  BP: 130/74 (24 May 2019 05:00) (113/61 - 131/67)  BP(mean): --  RR: 18 (24 May 2019 05:00) (18 - 18)  SpO2: 95% (24 May 2019 05:00) (90% - 99%)    I&O's Summary    23 May 2019 07:01  -  24 May 2019 07:00  --------------------------------------------------------  IN: 600 mL / OUT: 4400 mL / NET: -3800 mL    PHYSICAL EXAM:  TELE: Sinus johnny  Constitutional: NAD, awake and alert, obese  HEENT: Moist Mucous Membranes, Anicteric  Pulmonary: Non-labored, breath sounds are clear bilaterally, No wheezing, rales or rhonchi  Cardiovascular: Regular, S1 and S2, No murmurs, rubs, gallops or clicks  Gastrointestinal: Bowel Sounds present, soft, nontender.   Lymph: 1+ BLE edema. No lymphadenopathy.  Skin: No visible rashes or left foot ulcer.  Psych:  Mood & affect appropriate    LABS: All Labs Reviewed:  TPro  5.7    /  Alb  2.4    /  TBili  0.2    /  DBili  .10    /  AST  15     /  ALT  26     /  AlkPhos  94     24 May 2019 07:22    < from: TTE Echo Doppler w/o Cont (05.16.19 @ 09:45) >     EXAM:  ECHO TTE WO CON COMP W DOPPLR         PROCEDURE DATE:  05/16/2019        INTERPRETATION:  INDICATION: Elevated troponin    Blood Pressure 137/62    Height 187.96 cm     Weight 81.6 kg       BSA   2.1 sq m    Dimensions:    LA 4.1       Normal Values: 2.0 - 4.0 cm    Ao 3.6        Normal Values: 2.0 - 3.8 cm  SEPTUM 1.3       Normal Values: 0.6 - 1.2 cm  PWT 1.3       Normal Values: 0.6 - 1.1 cm  LVIDd 6.0         Normal Values: 3.0 - 5.6 cm  LVIDs 3.1         Normal Values: 1.8 - 4.0 cm      OBSERVATIONS:    Mitral Valve: Thickened leaflets, moderate MR.  Aortic Valve/Aorta: Sclerotic trileaflet aortic valve with normal   opening. Trace AI  Tricuspid Valve: normal with mild TR.  Pulmonic Valve: normal  Left Atrium: Mildly dilated  Right Atrium: normal  Left Ventricle: normal LV size and systolic function, estimated LVEF of   65%. No evidence of segmental dysfunction. Basal septal hypertrophy is   present  Right Ventricle: normal size and systolic function.  Pericardium/Pleura: normal, no significant pericardial effusion.    Conclusion:     normal LV size and systolic function, estimated LVEF of 65%. No evidence   of segmental dysfunction  Normal RV size and systolic function.   Mild left atrial enlargement  Sclerotic trileaflet aortic valve with normal opening. Trace AI  Mitral valve with thickened leaflets and moderate MR  Mild TR  No significant pericardial effusion.      MIROSLAVA CASTAÑEDA   This document has been electronically signed. May 17 2019  7:45AM     < end of copied text >    < from: Xray Chest 1 View- PORTABLE-Routine (05.12.19 @ 05:45) >    EXAM:  XR CHEST PORTABLE ROUTINE 1V                            PROCEDURE DATE:  05/12/2019          INTERPRETATION:  Clinical information: Productive cough    Portable study, 5:39 AM    Comparison study dated 5/10/2019, 10:29 PM.    Cardiac monitorleads present. A nasogastric tube is present, the distal   end of the tube enters the stomach in the left upper quadrant.    Hazy appearance the lung bases likely related to a combination of   vascular shadows and overlying soft tissues. However recommend a   follow-up study, PA and lateral views of the chest to better define the   lung bases.    Heart size upper limits of normal magnified secondary to portable   technique. No acute osseous abnormalities.    IMPRESSION: See above report    LETY LONDONO M.D.,ATTENDING RADIOLOGIST  This document has been electronically signed. May 12 2019 12:53PM      < end of copied text >    < from: CT Abdomen and Pelvis No Cont (05.10.19 @ 22:58) >    EXAM:  CT ABDOMEN AND PELVIS                            PROCEDURE DATE:  05/10/2019          INTERPRETATION:  Abdominal/Pelvic CT     Indication: Severe abdominal pain      Technique:  Axial images were obtained from lung bases through pubic   symphysis without contrast.  Reformatted coronal and sagittal images were   submitted.    COMPARISON: None    FINDINGS:    Lung bases:  There are no pleural effusions. None coronary   atherosclerosis.  Peritoneum: Small amount of free intraperitoneal air.  Evaluation of solid abdominal organ is diminished without IV contrast.    Liver: Unremarkable.  Spleen: Unremarkable.  Gallbladder: Unremarkable.  Biliary tree: Unremarkable.  Pancreas: Unremarkable.  Adrenal glands: Unremarkable.    Kidneys: No perinephric stranding, renal edema, hydronephrosis,   obstructing stone, or intrarenal stone.    Urinary bladder: Incompletely distended.    Pelvic organs: No pelvic masses.    Bowel: The stomach is incompletely distended. There is a possible   ulceration in the greater antrum suspicious for a gastric ulcer. There   are foci of free intraperitoneal air around the falciform ligament and   periumbilical region. There is no small bowel obstruction.  Appendix is   unremarkable.    Vasculature: Aorta is not dilated. There is mild atherosclerotic vascular   calcification.  There is no significant adenopathy.  Bones: Chronic degenerative changes of the spine.  Subcutaneous tissues: Umbilical hernia contains fat. Supraumbilical   hernia contains fluid.    IMPRESSION:    Small amount of free intraperitoneal air related to a perforated gastric   antral ulcer.  Findings discussed with Dr. Sorenson at 11:35 pm on 5/10/19   with RBV.  Supraumbilical hernia contains fluid and umbilical hernia contains fat.    ESHA MCMULLEN M.D, ATTENDING RADIOLOGIST  This document has been electronically signed. May 10 2019 11:35PM     < end of copied text >

## 2019-05-24 NOTE — PROGRESS NOTE ADULT - SUBJECTIVE AND OBJECTIVE BOX
Patient is a 48y old  Male who presents with a chief complaint of perforated stomach ulcer (13 May 2019 13:25) seen by podiatry for left foot medial 1st IPJ joint G2 DFU     HPI:  48 year old man, seen at bedside during podiatry rounds with Dr Hale for left foot medial hallux G2 DFU. Patient resting in bed conformably in NAD. Patient denies N/F/V/SOB/CP or calf pain at this time. Patient denies any further pedal complaints at this time. Patient agrees to go ahead and take MRI today.     Vital Signs Last 24 Hrs  T(C): 36.8 (24 May 2019 11:10), Max: 37.2 (23 May 2019 19:55)  T(F): 98.3 (24 May 2019 11:10), Max: 98.9 (23 May 2019 19:55)  HR: 62 (24 May 2019 11:10) (60 - 67)  BP: 124/76 (24 May 2019 11:10) (113/61 - 131/67)  BP(mean): --  RR: 18 (24 May 2019 11:10) (18 - 18)  SpO2: 95% (24 May 2019 11:10) (90% - 99%)        TPro  5.7<L>  /  Alb  2.4<L>  /  TBili  0.2  /  DBili  .10  /  AST  15  /  ALT  26  /  AlkPhos  94  05-24    PAST MEDICAL & SURGICAL HISTORY:  Diabetes mellitus with no complication  No significant past surgical history    MEDICATIONS  (STANDING):  amiodarone    Tablet   Oral   amiodarone    Tablet 200 milliGRAM(s) Oral daily  chlorhexidine 2% Cloths 1 Application(s) Topical daily  dextrose 5%. 1000 milliLiter(s) (50 mL/Hr) IV Continuous <Continuous>  dextrose 50% Injectable 12.5 Gram(s) IV Push once  dextrose 50% Injectable 25 Gram(s) IV Push once  dextrose 50% Injectable 25 Gram(s) IV Push once  enoxaparin Injectable 80 milliGRAM(s) SubCutaneous two times a day  insulin glargine Injectable (LANTUS) 10 Unit(s) SubCutaneous at bedtime  insulin lispro (HumaLOG) corrective regimen sliding scale   SubCutaneous Before meals and at bedtime  nicotine -  14 mG/24Hr(s) Patch 1 patch Transdermal daily  pantoprazole    Tablet 40 milliGRAM(s) Oral two times a day  tamsulosin 0.8 milliGRAM(s) Oral at bedtime    MEDICATIONS  (PRN):  acetaminophen   Tablet .. 650 milliGRAM(s) Oral every 6 hours PRN Mild Pain (1 - 3)  aluminum hydroxide/magnesium hydroxide/simethicone Suspension 30 milliLiter(s) Oral every 4 hours PRN Dyspepsia  dextrose 40% Gel 15 Gram(s) Oral once PRN Blood Glucose LESS THAN 70 milliGRAM(s)/deciliter  glucagon  Injectable 1 milliGRAM(s) IntraMuscular once PRN Glucose LESS THAN 70 milligrams/deciliter  HYDROmorphone  Injectable 0.25 milliGRAM(s) IV Push every 4 hours PRN Severe Pain (7 - 10)  nitroglycerin     SubLingual 0.4 milliGRAM(s) SubLingual every 5 minutes PRN Chest Pain  oxyCODONE    IR 5 milliGRAM(s) Oral every 6 hours PRN Moderate Pain (4 - 6)    Objective   Vascular: DP/PT palpable, CFT<3, Temp gradient warm to cool pedal hair absent, no edema present at this time   Derm: Left great toe medial IPJ DFU measures 1cmx.5cm down to subcutaneous fat, no prob to bone, periwound erythema decreased no maceration, no drainage, no mal odor, no tracking no tunneling probe to subcutaneous fat   Musc: hammer toe deformity 2-5 with overlapping of digits, pes planus, HAV deformity   Nuero: protective sensation absent

## 2019-05-24 NOTE — PROGRESS NOTE ADULT - SUBJECTIVE AND OBJECTIVE BOX
Patient is a 48y old  Male who presents with a chief complaint of perforated stomach ulcer (24 May 2019 11:42)      INTERVAL HPI/OVERNIGHT EVENTS:  T(C): 36.9 (05-24-19 @ 16:15), Max: 37.2 (05-23-19 @ 19:55)  HR: 71 (05-24-19 @ 16:15) (60 - 71)  BP: 124/73 (05-24-19 @ 16:15) (121/60 - 131/67)  RR: 16 (05-24-19 @ 16:15) (16 - 18)  SpO2: 95% (05-24-19 @ 16:15) (90% - 98%)  Wt(kg): --  I&O's Summary    23 May 2019 07:01  -  24 May 2019 07:00  --------------------------------------------------------  IN: 600 mL / OUT: 4400 mL / NET: -3800 mL    24 May 2019 07:01  -  24 May 2019 19:22  --------------------------------------------------------  IN: 0 mL / OUT: 1600 mL / NET: -1600 mL        LABS:        TPro  5.7<L>  /  Alb  2.4<L>  /  TBili  0.2  /  DBili  .10  /  AST  15  /  ALT  26  /  AlkPhos  94  05-24        CAPILLARY BLOOD GLUCOSE      POCT Blood Glucose.: 201 mg/dL (24 May 2019 17:06)  POCT Blood Glucose.: 199 mg/dL (24 May 2019 12:07)  POCT Blood Glucose.: 153 mg/dL (24 May 2019 08:14)  POCT Blood Glucose.: 195 mg/dL (23 May 2019 21:19)            MEDICATIONS  (STANDING):  amiodarone    Tablet   Oral   amiodarone    Tablet 200 milliGRAM(s) Oral daily  chlorhexidine 2% Cloths 1 Application(s) Topical daily  dextrose 5%. 1000 milliLiter(s) (50 mL/Hr) IV Continuous <Continuous>  dextrose 50% Injectable 12.5 Gram(s) IV Push once  dextrose 50% Injectable 25 Gram(s) IV Push once  dextrose 50% Injectable 25 Gram(s) IV Push once  enoxaparin Injectable 80 milliGRAM(s) SubCutaneous two times a day  insulin glargine Injectable (LANTUS) 10 Unit(s) SubCutaneous at bedtime  insulin lispro (HumaLOG) corrective regimen sliding scale   SubCutaneous Before meals and at bedtime  nicotine -  14 mG/24Hr(s) Patch 1 patch Transdermal daily  pantoprazole    Tablet 40 milliGRAM(s) Oral two times a day  tamsulosin 0.8 milliGRAM(s) Oral at bedtime    MEDICATIONS  (PRN):  acetaminophen   Tablet .. 650 milliGRAM(s) Oral every 6 hours PRN Mild Pain (1 - 3)  aluminum hydroxide/magnesium hydroxide/simethicone Suspension 30 milliLiter(s) Oral every 4 hours PRN Dyspepsia  dextrose 40% Gel 15 Gram(s) Oral once PRN Blood Glucose LESS THAN 70 milliGRAM(s)/deciliter  glucagon  Injectable 1 milliGRAM(s) IntraMuscular once PRN Glucose LESS THAN 70 milligrams/deciliter  HYDROmorphone  Injectable 0.25 milliGRAM(s) IV Push every 4 hours PRN Severe Pain (7 - 10)  nitroglycerin     SubLingual 0.4 milliGRAM(s) SubLingual every 5 minutes PRN Chest Pain  oxyCODONE    IR 5 milliGRAM(s) Oral every 6 hours PRN Moderate Pain (4 - 6)      REVIEW OF SYSTEMS:  CONSTITUTIONAL: No fever, weight loss, or fatigue  EYES: No eye pain, visual disturbances, or discharge  ENMT:  No difficulty hearing, tinnitus, vertigo; No sinus or throat pain  NECK: No pain or stiffness  RESPIRATORY: No cough, wheezing, chills or hemoptysis; No shortness of breath  CARDIOVASCULAR: No chest pain, palpitations, dizziness, or leg swelling  GASTROINTESTINAL: No abdominal or epigastric pain. No nausea, vomiting, or hematemesis; No diarrhea or constipation. No melena or hematochezia.  GENITOURINARY: No dysuria, frequency, hematuria, or incontinence  NEUROLOGICAL: No headaches, memory loss, loss of strength, numbness, or tremors  a    RADIOLOGY & ADDITIONAL TESTS:    Imaging Personally Reviewed:  [ ] YES  [ ] NO    Consultant(s) Notes Reviewed:  [ ] YES  [ ] NO    PHYSICAL EXAM:  GENERAL: NAD, well-groomed, well-developed  NERVOUS SYSTEM:  Alert & Oriented X3, Good concentration; Motor Strength 5/5 B/L upper and lower extremities; DTRs 2+ intact and symmetric  CHEST/LUNG: Clear to percussion bilaterally; No rales, rhonchi, wheezing, or rubs  HEART: Regular rate and rhythm; No murmurs, rubs, or gallops  ABDOMEN: Soft, Nontender, Nondistended; Bowel sounds present  Care Discussed with Consultants/Other Providers [ ] YES  [ ] NO

## 2019-05-24 NOTE — PROGRESS NOTE ADULT - ASSESSMENT
Patient is a 48y old  Male who presents with a chief complaint of perforated stomach ulcer (13 May 2019 13:25) seen by podiatry for left foot medial hallux  infected G2 DFU

## 2019-05-25 DIAGNOSIS — M86.9 OSTEOMYELITIS, UNSPECIFIED: ICD-10-CM

## 2019-05-25 DIAGNOSIS — Z29.9 ENCOUNTER FOR PROPHYLACTIC MEASURES, UNSPECIFIED: ICD-10-CM

## 2019-05-25 PROCEDURE — 99232 SBSQ HOSP IP/OBS MODERATE 35: CPT

## 2019-05-25 RX ADMIN — AMIODARONE HYDROCHLORIDE 200 MILLIGRAM(S): 400 TABLET ORAL at 05:12

## 2019-05-25 RX ADMIN — ENOXAPARIN SODIUM 80 MILLIGRAM(S): 100 INJECTION SUBCUTANEOUS at 05:12

## 2019-05-25 RX ADMIN — Medication 2: at 12:13

## 2019-05-25 RX ADMIN — PANTOPRAZOLE SODIUM 40 MILLIGRAM(S): 20 TABLET, DELAYED RELEASE ORAL at 17:19

## 2019-05-25 RX ADMIN — OXYCODONE HYDROCHLORIDE 5 MILLIGRAM(S): 5 TABLET ORAL at 22:31

## 2019-05-25 RX ADMIN — Medication 4: at 22:59

## 2019-05-25 RX ADMIN — CHLORHEXIDINE GLUCONATE 1 APPLICATION(S): 213 SOLUTION TOPICAL at 11:18

## 2019-05-25 RX ADMIN — ENOXAPARIN SODIUM 80 MILLIGRAM(S): 100 INJECTION SUBCUTANEOUS at 17:19

## 2019-05-25 RX ADMIN — INSULIN GLARGINE 10 UNIT(S): 100 INJECTION, SOLUTION SUBCUTANEOUS at 23:01

## 2019-05-25 RX ADMIN — Medication 4: at 17:19

## 2019-05-25 RX ADMIN — Medication 1 PATCH: at 11:16

## 2019-05-25 RX ADMIN — TAMSULOSIN HYDROCHLORIDE 0.8 MILLIGRAM(S): 0.4 CAPSULE ORAL at 22:31

## 2019-05-25 RX ADMIN — Medication 1 PATCH: at 07:00

## 2019-05-25 RX ADMIN — PANTOPRAZOLE SODIUM 40 MILLIGRAM(S): 20 TABLET, DELAYED RELEASE ORAL at 05:12

## 2019-05-25 RX ADMIN — Medication 1 PATCH: at 12:13

## 2019-05-25 NOTE — PROGRESS NOTE ADULT - PROBLEM SELECTOR PLAN 5
dvt prophylaxis: on full dose ac for now, hold off long term ac for now per cards resolved at present  ekg nsr, without st or t changes  slg ntg prn now   cardio follow up

## 2019-05-25 NOTE — PROGRESS NOTE ADULT - ATTENDING COMMENTS
Patient seen and evaluated   MRI reviewed and discussed with patient   Wound cleaned with NS and dressed with silvercel DSD  please continue local wound care    Planned procedure left foot hallux resection and debridement via cutting of skin soft tissue and bone as deemed necessary due to OM   Explained risk benefits and complications   No guarantees given implied or promises made   Pt verbalized understanding and will provide informed written consent that will be witnessed and signed prior to procedure   Please medically clear patient for OR procedure Tuesday 5/28/19   Please ensure patient is NPO    Wound care instructions   clean with NS   apply silver alginate DSD   change daily

## 2019-05-25 NOTE — PROGRESS NOTE ADULT - PROBLEM SELECTOR PLAN 3
importance of compliance discussed on po amio  given age, hopefully can relegate to relatively short course  on lovenox, long term full AC may not be necessary on PO amio  given age, hopefully can relegate to relatively short course  on lovenox, long term full AC may not be necessary

## 2019-05-25 NOTE — PROGRESS NOTE ADULT - PROBLEM SELECTOR PLAN 4
resolved at present  ekg nsr, without st or t changes  slg ntg prn now   cardio follow up importance of compliance discussed

## 2019-05-25 NOTE — PROGRESS NOTE ADULT - ASSESSMENT
Patient is a 48y old  Male who presents with a chief complaint of perforated stomach ulcer (13 May 2019 13:25) seen by podiatry for left foot medial hallux  infected G2 DFU with underlying OM

## 2019-05-25 NOTE — PROGRESS NOTE ADULT - PROBLEM SELECTOR PLAN 1
stable, s/p repair  pt tolerating diet well  dc planning stable, s/p repair  pt tolerating regular diet well  dispo planning s/p podiatry intervention for osteo - planned for 5/28

## 2019-05-25 NOTE — PROGRESS NOTE ADULT - SUBJECTIVE AND OBJECTIVE BOX
NP Progress Note     [facility] -- Bolivar Carbajal, El, Brittany, Reynaldo Sweeney Savella  Office # [phone]      Follow Up: Afib    HPI:  48 year old man, had sudden onset at 7 PM of abdominal pain (11 May 2019 00:30)        Subjective/Observations: Pt. seen and examined and evaluated. Pt. resting comfortably in bed in NAD, with no respiratory distress, no chest pain, dyspnea, palpitations, PND, or orthopnea.      REVIEW OF SYSTEMS: All other review of systems is negative unless indicated above    PAST MEDICAL & SURGICAL HISTORY:  Diabetes mellitus with no complication  No significant past surgical history      MEDICATIONS  (STANDING):  amiodarone    Tablet   Oral   amiodarone    Tablet 200 milliGRAM(s) Oral daily  chlorhexidine 2% Cloths 1 Application(s) Topical daily  dextrose 5%. 1000 milliLiter(s) (50 mL/Hr) IV Continuous <Continuous>  dextrose 50% Injectable 12.5 Gram(s) IV Push once  dextrose 50% Injectable 25 Gram(s) IV Push once  dextrose 50% Injectable 25 Gram(s) IV Push once  enoxaparin Injectable 80 milliGRAM(s) SubCutaneous two times a day  insulin glargine Injectable (LANTUS) 10 Unit(s) SubCutaneous at bedtime  insulin lispro (HumaLOG) corrective regimen sliding scale   SubCutaneous Before meals and at bedtime  nicotine -  14 mG/24Hr(s) Patch 1 patch Transdermal daily  pantoprazole    Tablet 40 milliGRAM(s) Oral two times a day  tamsulosin 0.8 milliGRAM(s) Oral at bedtime    MEDICATIONS  (PRN):  acetaminophen   Tablet .. 650 milliGRAM(s) Oral every 6 hours PRN Mild Pain (1 - 3)  aluminum hydroxide/magnesium hydroxide/simethicone Suspension 30 milliLiter(s) Oral every 4 hours PRN Dyspepsia  dextrose 40% Gel 15 Gram(s) Oral once PRN Blood Glucose LESS THAN 70 milliGRAM(s)/deciliter  glucagon  Injectable 1 milliGRAM(s) IntraMuscular once PRN Glucose LESS THAN 70 milligrams/deciliter  nitroglycerin     SubLingual 0.4 milliGRAM(s) SubLingual every 5 minutes PRN Chest Pain  oxyCODONE    IR 5 milliGRAM(s) Oral every 6 hours PRN Moderate Pain (4 - 6)      Allergies: No Known Allergies      Vital Signs Last 24 Hrs  T(C): 36.8 (25 May 2019 04:30), Max: 37.2 (24 May 2019 21:00)  T(F): 98.2 (25 May 2019 04:30), Max: 98.9 (24 May 2019 21:00)  HR: 67 (25 May 2019 04:30) (62 - 71)  BP: 107/64 (25 May 2019 04:30) (107/64 - 147/79)  BP(mean): --  RR: 17 (25 May 2019 04:30) (16 - 18)  SpO2: 93% (25 May 2019 04:30) (93% - 98%)    I&O's Summary    24 May 2019 07:01  -  25 May 2019 07:00  --------------------------------------------------------  IN: 240 mL / OUT: 4300 mL / NET: -4060 mL              LABS: All Labs Reviewed:        TPro  5.7    /  Alb  2.4    /  TBili  0.2    /  DBili  .10    /  AST  15     /  ALT  26     /  AlkPhos  94     24 May 2019 07:22               Echo:  < from: TTE Echo Doppler w/o Cont (05.16.19 @ 09:45) >  OBSERVATIONS:    Mitral Valve: Thickened leaflets, moderate MR.  Aortic Valve/Aorta: Sclerotic trileaflet aortic valve with normal   opening. Trace AI  Tricuspid Valve: normal with mild TR.  Pulmonic Valve: normal  Left Atrium: Mildly dilated  Right Atrium: normal  Left Ventricle: normal LV size and systolic function, estimated LVEF of   65%. No evidence of segmental dysfunction. Basal septal hypertrophy is   present  Right Ventricle: normal size and systolic function.  Pericardium/Pleura: normal, no significant pericardial effusion.    Conclusion:     normal LV size and systolic function, estimated LVEF of 65%. No evidence   of segmental dysfunction  Normal RV size and systolic function.   Mild left atrial enlargement  Sclerotic trileaflet aortic valve with normal opening. Trace AI  Mitral valve with thickened leaflets and moderate MR  Mild TR  No significant pericardial effusion.        Interpretation of Telemetry:      Physical Exam:  Appearance: [ ] Normal  [ ] abnormal [ ] NAD   Eyes: [ ] PERRL [ ] EOMI  HEENT: [ ] Normal [ ] Abnormal oral mucosa [ ]NC/AT  Cardiovascular: [ ] S1 [ ] S2 [ ] RRR [ ] m/r/g [ ]edema [ ] JVP  Procedural Access Site: [ ]  hematoma [ ] tender to palpation [ ] 2+ pulse [ ] bruit [ ] Ecchymosis  Respiratory: [ ] Clear to auscultation bilaterally  Gastrointestinal: [ ] Soft [ ] tenderness[ ] distension [ ] BS  Musculoskeletal: [ ] clubbing [ ] joint deformity   Neurologic: [ ] Non-focal  Lymphatic: [ ] lymphadenopathy  Psychiatry: [ ] AAOx3  [ ] confused [ ] disoriented [ ] Mood & affect appropriate  Skin: [ ]  rashes [ ] ecchymoses [ ] cyanosis NP Progress Note     [facility] -- Bolivar Carbajal, El, Brittany, Reynaldo Sweeney Savella  Office # [phone]      Follow Up: Afib    HPI:  48 year old man, had sudden onset at 7 PM of abdominal pain (11 May 2019 00:30)        Subjective/Observations: Pt. seen and examined and evaluated. Pt. resting comfortably in bed in NAD, with no respiratory distress, no chest pain, dyspnea, palpitations, PND, or orthopnea.      REVIEW OF SYSTEMS: All other review of systems is negative unless indicated above    PAST MEDICAL & SURGICAL HISTORY:  Diabetes mellitus with no complication  No significant past surgical history      MEDICATIONS  (STANDING):  amiodarone    Tablet   Oral   amiodarone    Tablet 200 milliGRAM(s) Oral daily  chlorhexidine 2% Cloths 1 Application(s) Topical daily  dextrose 5%. 1000 milliLiter(s) (50 mL/Hr) IV Continuous <Continuous>  dextrose 50% Injectable 12.5 Gram(s) IV Push once  dextrose 50% Injectable 25 Gram(s) IV Push once  dextrose 50% Injectable 25 Gram(s) IV Push once  enoxaparin Injectable 80 milliGRAM(s) SubCutaneous two times a day  insulin glargine Injectable (LANTUS) 10 Unit(s) SubCutaneous at bedtime  insulin lispro (HumaLOG) corrective regimen sliding scale   SubCutaneous Before meals and at bedtime  nicotine -  14 mG/24Hr(s) Patch 1 patch Transdermal daily  pantoprazole    Tablet 40 milliGRAM(s) Oral two times a day  tamsulosin 0.8 milliGRAM(s) Oral at bedtime    MEDICATIONS  (PRN):  acetaminophen   Tablet .. 650 milliGRAM(s) Oral every 6 hours PRN Mild Pain (1 - 3)  aluminum hydroxide/magnesium hydroxide/simethicone Suspension 30 milliLiter(s) Oral every 4 hours PRN Dyspepsia  dextrose 40% Gel 15 Gram(s) Oral once PRN Blood Glucose LESS THAN 70 milliGRAM(s)/deciliter  glucagon  Injectable 1 milliGRAM(s) IntraMuscular once PRN Glucose LESS THAN 70 milligrams/deciliter  nitroglycerin     SubLingual 0.4 milliGRAM(s) SubLingual every 5 minutes PRN Chest Pain  oxyCODONE    IR 5 milliGRAM(s) Oral every 6 hours PRN Moderate Pain (4 - 6)      Allergies: No Known Allergies      Vital Signs Last 24 Hrs  T(C): 36.8 (25 May 2019 04:30), Max: 37.2 (24 May 2019 21:00)  T(F): 98.2 (25 May 2019 04:30), Max: 98.9 (24 May 2019 21:00)  HR: 67 (25 May 2019 04:30) (62 - 71)  BP: 107/64 (25 May 2019 04:30) (107/64 - 147/79)  BP(mean): --  RR: 17 (25 May 2019 04:30) (16 - 18)  SpO2: 93% (25 May 2019 04:30) (93% - 98%)    I&O's Summary    24 May 2019 07:01  -  25 May 2019 07:00  --------------------------------------------------------  IN: 240 mL / OUT: 4300 mL / NET: -4060 mL        LABS: All Labs Reviewed:        TPro  5.7    /  Alb  2.4    /  TBili  0.2    /  DBili  .10    /  AST  15     /  ALT  26     /  AlkPhos  94     24 May 2019 07:22               Echo:  < from: TTE Echo Doppler w/o Cont (05.16.19 @ 09:45) >  OBSERVATIONS:    Mitral Valve: Thickened leaflets, moderate MR.  Aortic Valve/Aorta: Sclerotic trileaflet aortic valve with normal   opening. Trace AI  Tricuspid Valve: normal with mild TR.  Pulmonic Valve: normal  Left Atrium: Mildly dilated  Right Atrium: normal  Left Ventricle: normal LV size and systolic function, estimated LVEF of   65%. No evidence of segmental dysfunction. Basal septal hypertrophy is   present  Right Ventricle: normal size and systolic function.  Pericardium/Pleura: normal, no significant pericardial effusion.    Conclusion:     normal LV size and systolic function, estimated LVEF of 65%. No evidence   of segmental dysfunction  Normal RV size and systolic function.   Mild left atrial enlargement  Sclerotic trileaflet aortic valve with normal opening. Trace AI  Mitral valve with thickened leaflets and moderate MR  Mild TR  No significant pericardial effusion.        Interpretation of Telemetry: Overnight on telemetry SR 60's      Physical Exam:  Appearance: [ ] Normal  [ ] abnormal [X] NAD   Eyes: [ ] PERRL [ ] EOMI  HEENT: [ ] Normal [ ] Abnormal oral mucosa [ ]NC/AT  Cardiovascular: [X ] S1 [X] S2 [X] B/L LE edema   Procedural Access Site: [ ]  hematoma [ ] tender to palpation [ ] 2+ pulse [ ] bruit [ ] Ecchymosis  Respiratory: [X] diminished BS   Gastrointestinal: [ ] Soft [ ] tenderness[ ] distension [ ] BS  Musculoskeletal: [ ] clubbing [ ] joint deformity   Neurologic: [ ] Non-focal  Lymphatic: [ ] lymphadenopathy  Psychiatry: [X ] AAOx3  [ ] confused [ ] disoriented [ ] Mood & affect appropriate  Skin: [ ]  rashes [ ] ecchymoses [ ] cyanosis

## 2019-05-25 NOTE — PROGRESS NOTE ADULT - ASSESSMENT
48 year old male with HTN and DM2, who presented with Perforated Antral Gastric Ulcer, POD 5 s/p Emergent Exp-lap Oomentopexy and plication. He had an RRT for atrial fibrillation with rapid ventricular response, which is new. He has had difficult to control periods of tachycardia and bradycardia, currently in a sinus rhythm this morning   He had an elevated troponin possibly suggestive of non-ST segment elevation myocardial infarction/demand ischemia. He has no known history of coronary artery disease      Afib  - His Afib was short -lived.  He converted to NSR and remained in NSR.  Continue monitoring for now for occult Afib  - s/p Amio load. Continue Amio 200 mg daily.  We are hoping that now that his acute process has resolved, he should maintain NSR and we would be able to stop Amio as an outpatient  - Now off Cardizem and digoxin.  Would not resume now  - We can consider BB once off Amio as outpatient   - TTE with normal LV function, moderate MR   - Continue Lovenox for now, though, Afib has not recurred.  Likely given his recent sig GI surgery and he is rhythm controlled on Amio without reoccurrence of Afib we would defer long term AC.   - Monitor s/s bleeding  - Watch creatinine and electrolytes. Keep K>4, Mg>2  - TSH normal  - If no recurrence of Afib while inpatient, we recommend an event monitor as outpatient.  - Follow up LFT's today    S/P omentopexy and plication of gastric ulcer   - Tolerated procedure well, however, had Afib postop, spontaneously to NSR  - Pain control  - Ambulate with PT    - Will need ischemic eval in the future once stable.  He will follow with us as outpatient  - All other workup per Primary  - Awaiting discharge to [facility]  - Will continue to follow     Bharti Cheney DNP, ANP-C  Cardiology 48 year old male with HTN and DM2, who presented with Perforated Antral Gastric Ulcer, POD 5 s/p Emergent Exp-lap Oomentopexy and plication. He had an RRT for atrial fibrillation with rapid ventricular response, which is new. He has had difficult to control periods of tachycardia and bradycardia, currently in a sinus rhythm this morning   He had an elevated troponin possibly suggestive of non-ST segment elevation myocardial infarction/demand ischemia. He has no known history of coronary artery disease      Afib  - Pt. remains in SR  Afib was short -lived.  He converted to NSR and remained in NSR.   - Continue Amio 200 mg daily if he remains in NSR and we would be able to stop Amio as an outpatient  - We can consider BB once off Amio as outpatient   - TTE with normal LV function, moderate MR   - Continue Lovenox for now, though, Afib has not recurred given his recent sig GI surgery and he is rhythm controlled on Amio without reoccurrence of Afib we would defer long term AC.   - If no recurrence of Afib while inpatient, we recommend an event monitor as outpatient.  - Monitor s/s bleeding  - Watch creatinine and electrolytes. Keep K>4, Mg>2  - TSH normal      S/P omentopexy and plication of gastric ulcer   - Tolerated procedure well, however, had Afib postop, spontaneously to NSR  - Pain control  - Ambulate with PT    - Will need ischemic eval in the future once stable.  He will follow with us as outpatient  - All other workup per Primary  - Awaiting discharge to [facility]  - Will continue to follow     Bharti Cheney DNP, ANP-C  Cardiology 48 year old male with HTN and DM2, who presented with Perforated Antral Gastric Ulcer, POD 5 s/p Emergent Exp-lap Oomentopexy and plication. He had an RRT for atrial fibrillation with rapid ventricular response, which is new. He has had difficult to control periods of tachycardia and bradycardia, currently in a sinus rhythm this morning   He had an elevated troponin possibly suggestive of non-ST segment elevation myocardial infarction/demand ischemia. He has no known history of coronary artery disease    Afib  - Pt. remains in SR  Afib was short-lived.  He converted to NSR and remained in NSR.   - Continue Amio 200 mg daily if he remains in NSR and we would be able to stop Amio as an outpatient  - We can consider BB once off Amio as outpatient   - TTE with normal LV function, moderate MR   - Continue Lovenox for now, though, Afib has not recurred given his recent sig GI surgery and he is rhythm controlled on Amio without reoccurrence of Afib we would defer long term AC.   - If no recurrence of Afib while inpatient, we recommend an event monitor as outpatient.  - Monitor s/s bleeding  - Watch creatinine and electrolytes. Keep K>4, Mg>2  - TSH normal    S/P omentopexy and plication of gastric ulcer   - Tolerated procedure well, however, had Afib postop, spontaneously to NSR  - Pain control  - Ambulate with PT    - MRI now with left foot osteomyelitis and he may need left hallux resection with debridement.  - No cardiac contraindication to proceeding at this time.  - All other workup per Primary  - Will continue to follow     Bharti Cheney DNP, ANP-C  Cardiology

## 2019-05-25 NOTE — PROGRESS NOTE ADULT - SUBJECTIVE AND OBJECTIVE BOX
Patient is a 48y old  Male who presents with a chief complaint of perforated stomach ulcer (25 May 2019 09:37)      INTERVAL HPI/OVERNIGHT EVENTS:  feels well. still with jacques. no complaints at present.    T(C): 36.7 (05-25-19 @ 07:18), Max: 37.2 (05-24-19 @ 21:00)  HR: 65 (05-25-19 @ 07:18) (62 - 71)  BP: 125/72 (05-25-19 @ 07:18) (107/64 - 147/79)  RR: 22 (05-25-19 @ 07:18) (16 - 22)  SpO2: 93% (05-25-19 @ 07:18) (93% - 98%)  Wt(kg): --  I&O's Summary    24 May 2019 07:01  -  25 May 2019 07:00  --------------------------------------------------------  IN: 240 mL / OUT: 4300 mL / NET: -4060 mL        LABS:        TPro  5.7<L>  /  Alb  2.4<L>  /  TBili  0.2  /  DBili  .10  /  AST  15  /  ALT  26  /  AlkPhos  94  05-24        CAPILLARY BLOOD GLUCOSE      POCT Blood Glucose.: 149 mg/dL (25 May 2019 07:45)  POCT Blood Glucose.: 228 mg/dL (24 May 2019 22:00)  POCT Blood Glucose.: 201 mg/dL (24 May 2019 17:06)  POCT Blood Glucose.: 199 mg/dL (24 May 2019 12:07)            MEDICATIONS  (STANDING):  amiodarone    Tablet   Oral   amiodarone    Tablet 200 milliGRAM(s) Oral daily  chlorhexidine 2% Cloths 1 Application(s) Topical daily  dextrose 5%. 1000 milliLiter(s) (50 mL/Hr) IV Continuous <Continuous>  dextrose 50% Injectable 12.5 Gram(s) IV Push once  dextrose 50% Injectable 25 Gram(s) IV Push once  dextrose 50% Injectable 25 Gram(s) IV Push once  enoxaparin Injectable 80 milliGRAM(s) SubCutaneous two times a day  insulin glargine Injectable (LANTUS) 10 Unit(s) SubCutaneous at bedtime  insulin lispro (HumaLOG) corrective regimen sliding scale   SubCutaneous Before meals and at bedtime  nicotine -  14 mG/24Hr(s) Patch 1 patch Transdermal daily  pantoprazole    Tablet 40 milliGRAM(s) Oral two times a day  tamsulosin 0.8 milliGRAM(s) Oral at bedtime    MEDICATIONS  (PRN):  acetaminophen   Tablet .. 650 milliGRAM(s) Oral every 6 hours PRN Mild Pain (1 - 3)  aluminum hydroxide/magnesium hydroxide/simethicone Suspension 30 milliLiter(s) Oral every 4 hours PRN Dyspepsia  dextrose 40% Gel 15 Gram(s) Oral once PRN Blood Glucose LESS THAN 70 milliGRAM(s)/deciliter  glucagon  Injectable 1 milliGRAM(s) IntraMuscular once PRN Glucose LESS THAN 70 milligrams/deciliter  nitroglycerin     SubLingual 0.4 milliGRAM(s) SubLingual every 5 minutes PRN Chest Pain  oxyCODONE    IR 5 milliGRAM(s) Oral every 6 hours PRN Moderate Pain (4 - 6)      REVIEW OF SYSTEMS:  CONSTITUTIONAL: No fever, weight loss, or fatigue  EYES: No eye pain, visual disturbances, or discharge  ENMT:  No difficulty hearing, tinnitus, vertigo; No sinus or throat pain  NECK: No pain or stiffness  RESPIRATORY: No cough, wheezing, chills or hemoptysis; No shortness of breath  CARDIOVASCULAR: No chest pain, palpitations, dizziness, or leg swelling  GASTROINTESTINAL: No abdominal or epigastric pain. No nausea, vomiting, or hematemesis; No diarrhea or constipation. No melena or hematochezia.  GENITOURINARY: +urinary retention  NEUROLOGICAL: No headaches, memory loss, loss of strength, numbness, or tremors  SKIN: No itching, burning, rashes, or lesions   LYMPH NODES: No enlarged glands  ENDOCRINE: No heat or cold intolerance; No hair loss  MUSCULOSKELETAL: No joint pain or swelling; No muscle, back, or extremity pain  PSYCHIATRIC: No depression, anxiety, mood swings, or difficulty sleeping  HEME/LYMPH: No easy bruising, or bleeding gums  ALLERGY AND IMMUNOLOGIC: No hives or eczema    RADIOLOGY & ADDITIONAL TESTS: none new    Consultant(s) Notes Reviewed:  [x] YES  [ ] NO    PHYSICAL EXAM:  GENERAL: NAD, well-groomed, well-developed  HEAD:  Atraumatic, Normocephalic  EYES: EOMI, PERRLA, conjunctiva and sclera clear  ENMT: No tonsillar erythema, exudates, or enlargement; Moist mucous membranes, Good dentition, No lesions  NECK: Supple, No JVD, Normal thyroid  NERVOUS SYSTEM:  Alert & Oriented X3, nonfocal  CHEST/LUNG: Clear to auscultation bilaterally; No rales, rhonchi, wheezing, or rubs  HEART: Regular rate and rhythm; No murmurs, rubs, or gallops  ABDOMEN: Soft, Nontender, Nondistended; Bowel sounds present; incision c/d/i  EXTREMITIES:  2+ Peripheral Pulses, No clubbing, cyanosis, or edema  LYMPH: No lymphadenopathy noted  SKIN: No rashes or lesions    Care Discussed with Consultants/Other Providers [ ] YES  [ ] NO Patient is a 48y old  Male who presents with a chief complaint of perforated stomach ulcer (25 May 2019 09:37)      INTERVAL HPI/OVERNIGHT EVENTS:  feels well. still with jacques. no complaints at present.    T(C): 36.7 (05-25-19 @ 07:18), Max: 37.2 (05-24-19 @ 21:00)  HR: 65 (05-25-19 @ 07:18) (62 - 71)  BP: 125/72 (05-25-19 @ 07:18) (107/64 - 147/79)  RR: 22 (05-25-19 @ 07:18) (16 - 22)  SpO2: 93% (05-25-19 @ 07:18) (93% - 98%)  Wt(kg): --  I&O's Summary    24 May 2019 07:01  -  25 May 2019 07:00  --------------------------------------------------------  IN: 240 mL / OUT: 4300 mL / NET: -4060 mL        LABS:        TPro  5.7<L>  /  Alb  2.4<L>  /  TBili  0.2  /  DBili  .10  /  AST  15  /  ALT  26  /  AlkPhos  94  05-24        CAPILLARY BLOOD GLUCOSE      POCT Blood Glucose.: 149 mg/dL (25 May 2019 07:45)  POCT Blood Glucose.: 228 mg/dL (24 May 2019 22:00)  POCT Blood Glucose.: 201 mg/dL (24 May 2019 17:06)  POCT Blood Glucose.: 199 mg/dL (24 May 2019 12:07)            MEDICATIONS  (STANDING):  amiodarone    Tablet   Oral   amiodarone    Tablet 200 milliGRAM(s) Oral daily  chlorhexidine 2% Cloths 1 Application(s) Topical daily  dextrose 5%. 1000 milliLiter(s) (50 mL/Hr) IV Continuous <Continuous>  dextrose 50% Injectable 12.5 Gram(s) IV Push once  dextrose 50% Injectable 25 Gram(s) IV Push once  dextrose 50% Injectable 25 Gram(s) IV Push once  enoxaparin Injectable 80 milliGRAM(s) SubCutaneous two times a day  insulin glargine Injectable (LANTUS) 10 Unit(s) SubCutaneous at bedtime  insulin lispro (HumaLOG) corrective regimen sliding scale   SubCutaneous Before meals and at bedtime  nicotine -  14 mG/24Hr(s) Patch 1 patch Transdermal daily  pantoprazole    Tablet 40 milliGRAM(s) Oral two times a day  tamsulosin 0.8 milliGRAM(s) Oral at bedtime    MEDICATIONS  (PRN):  acetaminophen   Tablet .. 650 milliGRAM(s) Oral every 6 hours PRN Mild Pain (1 - 3)  aluminum hydroxide/magnesium hydroxide/simethicone Suspension 30 milliLiter(s) Oral every 4 hours PRN Dyspepsia  dextrose 40% Gel 15 Gram(s) Oral once PRN Blood Glucose LESS THAN 70 milliGRAM(s)/deciliter  glucagon  Injectable 1 milliGRAM(s) IntraMuscular once PRN Glucose LESS THAN 70 milligrams/deciliter  nitroglycerin     SubLingual 0.4 milliGRAM(s) SubLingual every 5 minutes PRN Chest Pain  oxyCODONE    IR 5 milliGRAM(s) Oral every 6 hours PRN Moderate Pain (4 - 6)      REVIEW OF SYSTEMS:  CONSTITUTIONAL: No fever, weight loss, or fatigue  EYES: No eye pain, visual disturbances, or discharge  ENMT:  No difficulty hearing, tinnitus, vertigo; No sinus or throat pain  NECK: No pain or stiffness  RESPIRATORY: No cough, wheezing, chills or hemoptysis; No shortness of breath  CARDIOVASCULAR: No chest pain, palpitations, dizziness, or leg swelling  GASTROINTESTINAL: No abdominal or epigastric pain. No nausea, vomiting, or hematemesis; No diarrhea or constipation. No melena or hematochezia.  GENITOURINARY: +urinary retention  NEUROLOGICAL: No headaches, memory loss, loss of strength, numbness, or tremors  SKIN: No itching, burning, rashes, or lesions   LYMPH NODES: No enlarged glands  ENDOCRINE: No heat or cold intolerance; No hair loss  MUSCULOSKELETAL: No joint pain or swelling; No muscle, back, or extremity pain  PSYCHIATRIC: No depression, anxiety, mood swings, or difficulty sleeping  HEME/LYMPH: No easy bruising, or bleeding gums  ALLERGY AND IMMUNOLOGIC: No hives or eczema    RADIOLOGY & ADDITIONAL TESTS: none new    Consultant(s) Notes Reviewed:  [x] YES  [ ] NO    PHYSICAL EXAM:  GENERAL: NAD, well-groomed, well-developed  HEAD:  Atraumatic, Normocephalic  EYES: EOMI, PERRLA, conjunctiva and sclera clear  ENMT: No tonsillar erythema, exudates, or enlargement; Moist mucous membranes, Good dentition, No lesions  NECK: Supple, No JVD, Normal thyroid  NERVOUS SYSTEM:  Alert & Oriented X3, nonfocal  CHEST/LUNG: Clear to auscultation bilaterally; No rales, rhonchi, wheezing, or rubs  HEART: Regular rate and rhythm; No murmurs, rubs, or gallops  ABDOMEN: Soft, Nontender, Nondistended; Bowel sounds present; incision c/d/i  EXTREMITIES:  2+ Peripheral Pulses, Noedema  LYMPH: No lymphadenopathy noted  SKIN: + chronic wound medial aspect left foot, wrapped    Care Discussed with Consultants/Other Providers [ ] YES  [ ] NO

## 2019-05-25 NOTE — PROGRESS NOTE ADULT - PROBLEM SELECTOR PLAN 2
on po amio  given age, hopefully can relegate to relatively short course  on lovenox, long term full AC may not be necessary first distal metatarsal osteomyelitis noted on MRI  abx per ID recommendations  plan for OR with podiatry on 5/28  no absolute medical contraindication to proposed procedure

## 2019-05-25 NOTE — PROGRESS NOTE ADULT - ASSESSMENT
48 year old male with HTN and DM2, who presented with Perforated Antral Gastric Ulcer, s/p Emergent Exp-lap Oomentopexy and plication. Found to have new onset afib with elevated troponin post-operatively. Now SR s/p bradycardia with av yung blockers (on amiodarone now). 48 year old male with HTN and DM2, who presented with Perforated Antral Gastric Ulcer, s/p Emergent Exp-lap Oomentopexy and plication. Found to have new onset afib with elevated troponin post-operatively. Now SR s/p bradycardia with av yung blockers (on amiodarone now) w left foot osteo plan for OR .

## 2019-05-25 NOTE — PROGRESS NOTE ADULT - SUBJECTIVE AND OBJECTIVE BOX
Patient is a 48y old  Male who presents with a chief complaint of perforated stomach ulcer (13 May 2019 13:25) seen by podiatry for left foot medial 1st IPJ joint G2 DFU     HPI:  48 year old man, seen at bedside during podiatry roundsfor left foot medial hallux G2 DFU. Patient resting in bed conformably in NAD. Patient denies N/F/V/SOB/CP or calf pain at this time. Patient denies any further pedal complaints at this time. MRI reading reviewed. Patient is aware that he will undergoing surgical intervention due to OM of left hallux. Procedure planned for Tuesday 5/28/19      ICU Vital Signs Last 24 Hrs  T(C): 36.7 (25 May 2019 07:18), Max: 37.2 (24 May 2019 21:00)  T(F): 98.1 (25 May 2019 07:18), Max: 98.9 (24 May 2019 21:00)  HR: 65 (25 May 2019 07:18) (62 - 71)  BP: 125/72 (25 May 2019 07:18) (107/64 - 147/79)  BP(mean): --  ABP: --  ABP(mean): --  RR: 22 (25 May 2019 07:18) (16 - 22)  SpO2: 93% (25 May 2019 07:18) (93% - 98%)        TPro  5.7<L>  /  Alb  2.4<L>  /  TBili  0.2  /  DBili  .10  /  AST  15  /  ALT  26  /  AlkPhos  94  05-24  MEDICATIONS  (STANDING):  amiodarone    Tablet   Oral   amiodarone    Tablet 200 milliGRAM(s) Oral daily  chlorhexidine 2% Cloths 1 Application(s) Topical daily  dextrose 5%. 1000 milliLiter(s) (50 mL/Hr) IV Continuous <Continuous>  dextrose 50% Injectable 12.5 Gram(s) IV Push once  dextrose 50% Injectable 25 Gram(s) IV Push once  dextrose 50% Injectable 25 Gram(s) IV Push once  enoxaparin Injectable 80 milliGRAM(s) SubCutaneous two times a day  insulin glargine Injectable (LANTUS) 10 Unit(s) SubCutaneous at bedtime  insulin lispro (HumaLOG) corrective regimen sliding scale   SubCutaneous Before meals and at bedtime  nicotine -  14 mG/24Hr(s) Patch 1 patch Transdermal daily  pantoprazole    Tablet 40 milliGRAM(s) Oral two times a day  tamsulosin 0.8 milliGRAM(s) Oral at bedtime    MEDICATIONS  (PRN):  acetaminophen   Tablet .. 650 milliGRAM(s) Oral every 6 hours PRN Mild Pain (1 - 3)  aluminum hydroxide/magnesium hydroxide/simethicone Suspension 30 milliLiter(s) Oral every 4 hours PRN Dyspepsia  dextrose 40% Gel 15 Gram(s) Oral once PRN Blood Glucose LESS THAN 70 milliGRAM(s)/deciliter  glucagon  Injectable 1 milliGRAM(s) IntraMuscular once PRN Glucose LESS THAN 70 milligrams/deciliter  nitroglycerin     SubLingual 0.4 milliGRAM(s) SubLingual every 5 minutes PRN Chest Pain  oxyCODONE    IR 5 milliGRAM(s) Oral every 6 hours PRN Moderate Pain (4 - 6)      EXAM:  MR FOOT WAW IC LT                            PROCEDURE DATE:  05/24/2019          INTERPRETATION:    EXAM:    MR Left Lower Extremity Without and With Contrast, Foot     EXAM DATE/TIME:    5/24/2019 4:14 PM     CLINICAL HISTORY:    48 years old, male; Toes; Left; Patient HX: Hallux ulcer, R/O   osteomyelitis, pain abcess     TECHNIQUE:    Imaging protocol: MR of the Left foot without and with intravenous   contrast.    Contrast material: GADAVIST; Contrast volume: 8.5 ml; Contrast route:   BOLUS;     COMPARISON:    DX XR FOOT 3 VIEWS LEFT 5/13/2019 3:09 PM     FINDINGS:     Medial forefoot wound/ulceration, soft tissue edema/inflammation and   enhancement around the distal first metatarsal. No focal drainable  fluid collection. Mild edema and scattered fluid in the forefoot   musculature.  Deformity and marrow edema in the distal first metatarsal, with   enhancement after intravenous contrast administration. Small fluid in the   first metatarsophalangeal joint, enhancing after intravenous contrast   administration. Superior dislocation  of the second MTP. Mild dorsal   subluxation of the first toe proximal phalanx. Chronic deformity of the   distal second metatarsal, with flattened morphology. Deformity of the   distal fifth   metatarsal with small adjacent fragment, without acute bone marrow edema   or enhancement. Fatty atrophy of the forefoot musculature. Soft tissue   defect at the lateral aspect of the forefoot.    IMPRESSION:   First distal metatarsal osteomyelitis, with enhancement in the   surrounding soft tissues. Soft tissue wound/ulceration, no focal soft   tissue abscess.  Chronic appearing irregularity at the distal fifth metatarsal, without   acute bone marrow edema enhancement.  Chronic-appearing deformity of the distal second metatarsal, with   dislocation at the second MTP joint.    MARIA A SALTER M.D., ATTENDING RADIOLOGIST  This document has been electronically signed. May 25 2019  9:08AM              Objective   Vascular: DP/PT palpable, CFT<3, Temp gradient warm to cool pedal hair absent, no edema present at this time   Derm: Left great toe medial IPJ DFU measures 1cmx.5cm down to subcutaneous fat, no prob to bone, periwound erythema decreased no maceration, no drainage, no mal odor, no tracking no tunneling probe to subcutaneous fat   Musc: hammer toe deformity 2-5 with overlapping of digits, pes planus, HAV deformity   Nuero: protective sensation absent

## 2019-05-26 LAB
ALBUMIN SERPL ELPH-MCNC: 2.4 G/DL — LOW (ref 3.3–5)
ALP SERPL-CCNC: 100 U/L — SIGNIFICANT CHANGE UP (ref 40–120)
ALT FLD-CCNC: 24 U/L — SIGNIFICANT CHANGE UP (ref 12–78)
ANION GAP SERPL CALC-SCNC: 6 MMOL/L — SIGNIFICANT CHANGE UP (ref 5–17)
APTT BLD: 34.6 SEC — SIGNIFICANT CHANGE UP (ref 27.5–36.3)
AST SERPL-CCNC: 18 U/L — SIGNIFICANT CHANGE UP (ref 15–37)
BASOPHILS # BLD AUTO: 0.06 K/UL — SIGNIFICANT CHANGE UP (ref 0–0.2)
BASOPHILS NFR BLD AUTO: 0.8 % — SIGNIFICANT CHANGE UP (ref 0–2)
BILIRUB SERPL-MCNC: 0.2 MG/DL — SIGNIFICANT CHANGE UP (ref 0.2–1.2)
BUN SERPL-MCNC: 25 MG/DL — HIGH (ref 7–23)
CALCIUM SERPL-MCNC: 8.2 MG/DL — LOW (ref 8.5–10.1)
CHLORIDE SERPL-SCNC: 107 MMOL/L — SIGNIFICANT CHANGE UP (ref 96–108)
CO2 SERPL-SCNC: 28 MMOL/L — SIGNIFICANT CHANGE UP (ref 22–31)
CREAT SERPL-MCNC: 1.4 MG/DL — HIGH (ref 0.5–1.3)
EOSINOPHIL # BLD AUTO: 0.25 K/UL — SIGNIFICANT CHANGE UP (ref 0–0.5)
EOSINOPHIL NFR BLD AUTO: 3.3 % — SIGNIFICANT CHANGE UP (ref 0–6)
GLUCOSE SERPL-MCNC: 153 MG/DL — HIGH (ref 70–99)
HCT VFR BLD CALC: 22.7 % — LOW (ref 39–50)
HCT VFR BLD CALC: 23.4 % — LOW (ref 39–50)
HGB BLD-MCNC: 7.5 G/DL — LOW (ref 13–17)
HGB BLD-MCNC: 7.5 G/DL — LOW (ref 13–17)
IMM GRANULOCYTES NFR BLD AUTO: 0.8 % — SIGNIFICANT CHANGE UP (ref 0–1.5)
INR BLD: 1.15 RATIO — SIGNIFICANT CHANGE UP (ref 0.88–1.16)
LYMPHOCYTES # BLD AUTO: 1.35 K/UL — SIGNIFICANT CHANGE UP (ref 1–3.3)
LYMPHOCYTES # BLD AUTO: 17.7 % — SIGNIFICANT CHANGE UP (ref 13–44)
MAGNESIUM SERPL-MCNC: 2 MG/DL — SIGNIFICANT CHANGE UP (ref 1.6–2.6)
MCHC RBC-ENTMCNC: 31.4 PG — SIGNIFICANT CHANGE UP (ref 27–34)
MCHC RBC-ENTMCNC: 31.6 PG — SIGNIFICANT CHANGE UP (ref 27–34)
MCHC RBC-ENTMCNC: 32.1 GM/DL — SIGNIFICANT CHANGE UP (ref 32–36)
MCHC RBC-ENTMCNC: 33 GM/DL — SIGNIFICANT CHANGE UP (ref 32–36)
MCV RBC AUTO: 95.8 FL — SIGNIFICANT CHANGE UP (ref 80–100)
MCV RBC AUTO: 97.9 FL — SIGNIFICANT CHANGE UP (ref 80–100)
MONOCYTES # BLD AUTO: 0.95 K/UL — HIGH (ref 0–0.9)
MONOCYTES NFR BLD AUTO: 12.5 % — SIGNIFICANT CHANGE UP (ref 2–14)
NEUTROPHILS # BLD AUTO: 4.95 K/UL — SIGNIFICANT CHANGE UP (ref 1.8–7.4)
NEUTROPHILS NFR BLD AUTO: 64.9 % — SIGNIFICANT CHANGE UP (ref 43–77)
NRBC # BLD: 0 /100 WBCS — SIGNIFICANT CHANGE UP (ref 0–0)
NRBC # BLD: 0 /100 WBCS — SIGNIFICANT CHANGE UP (ref 0–0)
PHOSPHATE SERPL-MCNC: 3.7 MG/DL — SIGNIFICANT CHANGE UP (ref 2.5–4.5)
PLATELET # BLD AUTO: 351 K/UL — SIGNIFICANT CHANGE UP (ref 150–400)
PLATELET # BLD AUTO: 408 K/UL — HIGH (ref 150–400)
POTASSIUM SERPL-MCNC: 4.1 MMOL/L — SIGNIFICANT CHANGE UP (ref 3.5–5.3)
POTASSIUM SERPL-SCNC: 4.1 MMOL/L — SIGNIFICANT CHANGE UP (ref 3.5–5.3)
PROT SERPL-MCNC: 6.1 G/DL — SIGNIFICANT CHANGE UP (ref 6–8.3)
PROTHROM AB SERPL-ACNC: 13 SEC — HIGH (ref 10–12.9)
RBC # BLD: 2.37 M/UL — LOW (ref 4.2–5.8)
RBC # BLD: 2.39 M/UL — LOW (ref 4.2–5.8)
RBC # FLD: 13.2 % — SIGNIFICANT CHANGE UP (ref 10.3–14.5)
RBC # FLD: 13.4 % — SIGNIFICANT CHANGE UP (ref 10.3–14.5)
SODIUM SERPL-SCNC: 141 MMOL/L — SIGNIFICANT CHANGE UP (ref 135–145)
WBC # BLD: 7.62 K/UL — SIGNIFICANT CHANGE UP (ref 3.8–10.5)
WBC # BLD: 7.83 K/UL — SIGNIFICANT CHANGE UP (ref 3.8–10.5)
WBC # FLD AUTO: 7.62 K/UL — SIGNIFICANT CHANGE UP (ref 3.8–10.5)
WBC # FLD AUTO: 7.83 K/UL — SIGNIFICANT CHANGE UP (ref 3.8–10.5)

## 2019-05-26 PROCEDURE — 99232 SBSQ HOSP IP/OBS MODERATE 35: CPT

## 2019-05-26 RX ADMIN — Medication 2: at 17:26

## 2019-05-26 RX ADMIN — CHLORHEXIDINE GLUCONATE 1 APPLICATION(S): 213 SOLUTION TOPICAL at 11:54

## 2019-05-26 RX ADMIN — AMIODARONE HYDROCHLORIDE 200 MILLIGRAM(S): 400 TABLET ORAL at 06:04

## 2019-05-26 RX ADMIN — Medication 1 PATCH: at 11:53

## 2019-05-26 RX ADMIN — Medication 2: at 08:15

## 2019-05-26 RX ADMIN — PANTOPRAZOLE SODIUM 40 MILLIGRAM(S): 20 TABLET, DELAYED RELEASE ORAL at 17:26

## 2019-05-26 RX ADMIN — Medication 1 PATCH: at 19:02

## 2019-05-26 RX ADMIN — Medication 6: at 22:19

## 2019-05-26 RX ADMIN — Medication 1 PATCH: at 11:54

## 2019-05-26 RX ADMIN — Medication 4: at 11:53

## 2019-05-26 RX ADMIN — ENOXAPARIN SODIUM 80 MILLIGRAM(S): 100 INJECTION SUBCUTANEOUS at 06:04

## 2019-05-26 RX ADMIN — Medication 1 PATCH: at 07:00

## 2019-05-26 RX ADMIN — INSULIN GLARGINE 10 UNIT(S): 100 INJECTION, SOLUTION SUBCUTANEOUS at 22:19

## 2019-05-26 RX ADMIN — PANTOPRAZOLE SODIUM 40 MILLIGRAM(S): 20 TABLET, DELAYED RELEASE ORAL at 06:04

## 2019-05-26 RX ADMIN — TAMSULOSIN HYDROCHLORIDE 0.8 MILLIGRAM(S): 0.4 CAPSULE ORAL at 21:45

## 2019-05-26 NOTE — PROGRESS NOTE ADULT - PROBLEM SELECTOR PLAN 1
stable, s/p repair  pt tolerating regular diet well  dispo planning s/p podiatry intervention for osteo - planned for 5/28

## 2019-05-26 NOTE — PROGRESS NOTE ADULT - ASSESSMENT
48 year old male with HTN and DM2, who presented with Perforated Antral Gastric Ulcer, POD 5 s/p Emergent Exp-lap Oomentopexy and plication. He had an RRT for atrial fibrillation with rapid ventricular response, which is new. He has had difficult to control periods of tachycardia and bradycardia, currently in a sinus rhythm this morning   He had an elevated troponin possibly suggestive of non-ST segment elevation myocardial infarction/demand ischemia. He has no known history of coronary artery disease    Afib  - Pt. remains in SR.  He converted to NSR from AFib, no further events on tele   - Continue Amio, plan is to stop Amio as an outpatient  - We can consider BB once off Amio as outpatient   - TTE with normal LV function, moderate MR   - Continue Lovenox for now, though, Afib has not recurred given his recent sig GI surgery and he is rhythm controlled on Amio without reoccurrence of Afib we would defer long term AC.   - If no recurrence of Afib while inpatient, we recommend an event monitor as outpatient.  - Monitor s/s bleeding, H/H stable, cont to monitor CBC  -  Maintain  K>4, Mg>2  - TSH normal  - vs stable as per flow sheet   - cont to monitor on tele     S/P omentopexy and plication of gastric ulcer   - Tolerated procedure well, however, had Afib postop, spontaneously to NSR  - Pain control  - Ambulate with PT    - MRI now with left foot osteomyelitis and he may need left hallux resection with debridement on Tuesday 5/28 ( as per podiatry)   - No cardiac contraindication to proceeding at this time. okay to proceed with surgery with routine cardiovascular monitoring   - All other workup per Primary  - Will continue to follow       Bee Cueto ANP-C  Cardiology 48 year old male with HTN and DM2, who presented with Perforated Antral Gastric Ulcer, POD 5 s/p Emergent Exp-lap Oomentopexy and plication. He had an RRT for atrial fibrillation with rapid ventricular response, which is new. He has had difficult to control periods of tachycardia and bradycardia, currently in a sinus rhythm this morning   He had an elevated troponin possibly suggestive of non-ST segment elevation myocardial infarction/demand ischemia. He has no known history of coronary artery disease    Afib  - Pt. remains in SR.  He converted to NSR from AFib, no further events on tele   - Continue Amio, plan is to stop Amio as an outpatient  - We can consider BB once off Amio as outpatient   - TTE with normal LV function, moderate MR   - Continue Lovenox for now, though, Afib has not recurred given his recent sig GI surgery and he is rhythm controlled on Amio without reoccurrence of Afib we would defer long term AC.   - If no recurrence of Afib while inpatient, we recommend an event monitor as outpatient.  - Monitor s/s bleeding, H/H stable, cont to monitor CBC  -  Maintain  K>4, Mg>2  - TSH normal  - vs stable as per flow sheet   - cont to monitor on tele     S/P omentopexy and plication of gastric ulcer   - Tolerated procedure well, however, had Afib postop, spontaneously to NSR  - Pain control  - Ambulate with PT    - MRI now with left foot osteomyelitis and he may need left hallux resection with debridement on Tuesday 5/28 ( as per podiatry)   - No cardiac contraindication to proceeding at this time. okay to proceed with surgery with routine cardiovascular monitoring   - HOLD  Subct Lovenox night before surgery   - All other workup per Primary  - Will continue to follow       Bee Cueto , ANP-C  Cardiology 48 year old male with HTN and DM2, who presented with Perforated Antral Gastric Ulcer, POD 5 s/p Emergent Exp-lap Oomentopexy and plication. He had an RRT for atrial fibrillation with rapid ventricular response, which is new. He has had difficult to control periods of tachycardia and bradycardia, currently in a sinus rhythm this morning   He had an elevated troponin possibly suggestive of non-ST segment elevation myocardial infarction/demand ischemia. He has no known history of coronary artery disease    Afib  - Pt. remains in SR.  He converted to NSR from AFib, no further events on tele   - Continue Amio, plan is to stop Amio as an outpatient  - We can consider BB once off Amio as outpatient   - TTE with normal LV function, moderate MR   - Continue Lovenox for now, though, Afib has not recurred given his recent sig GI surgery and he is rhythm controlled on Amio without reoccurrence of Afib we would defer long term AC.   - If no recurrence of Afib while inpatient, we recommend an event monitor as outpatient.  - Monitor s/s bleeding, H/H stable, cont to monitor CBC  -  Maintain  K>4, Mg>2  - TSH normal  - vs stable as per flow sheet   - cont to monitor on tele     S/P omentopexy and plication of gastric ulcer   - Tolerated procedure well, however, had Afib postop, spontaneously to NSR  - Pain control  - Ambulate with PT    - MRI now with left foot osteomyelitis and he may need left hallux resection with debridement on Tuesday 5/28 ( as per podiatry)   - No cardiac contraindication to proceeding at this time. okay to proceed with surgery with routine cardiovascular monitoring   - HOLD Subct Lovenox night before surgery   - All other workup per Primary  - Will continue to follow       Bee Cueto , ANP-C  Cardiology

## 2019-05-26 NOTE — PROGRESS NOTE ADULT - SUBJECTIVE AND OBJECTIVE BOX
[facility] -- Bolivar Carbajal, El, Brittany, Reynaldo Sweeney Savella  Office # [phone]      Follow Up:  Afib     Subjective/Observations:   Patient seen and examined.  Pt. resting comfortably in bed in NAD, with no respiratory distress, no chest pain, dyspnea, palpitations, PND, or orthopnea.    REVIEW OF SYSTEMS: All other review of systems is negative unless indicated above    PAST MEDICAL & SURGICAL HISTORY:  Diabetes mellitus with no complication  No significant past surgical history      MEDICATIONS  (STANDING):  amiodarone    Tablet   Oral   amiodarone    Tablet 200 milliGRAM(s) Oral daily  chlorhexidine 2% Cloths 1 Application(s) Topical daily  dextrose 5%. 1000 milliLiter(s) (50 mL/Hr) IV Continuous <Continuous>  dextrose 50% Injectable 12.5 Gram(s) IV Push once  dextrose 50% Injectable 25 Gram(s) IV Push once  dextrose 50% Injectable 25 Gram(s) IV Push once  enoxaparin Injectable 80 milliGRAM(s) SubCutaneous two times a day  insulin glargine Injectable (LANTUS) 10 Unit(s) SubCutaneous at bedtime  insulin lispro (HumaLOG) corrective regimen sliding scale   SubCutaneous Before meals and at bedtime  nicotine -  14 mG/24Hr(s) Patch 1 patch Transdermal daily  pantoprazole    Tablet 40 milliGRAM(s) Oral two times a day  tamsulosin 0.8 milliGRAM(s) Oral at bedtime    MEDICATIONS  (PRN):  acetaminophen   Tablet .. 650 milliGRAM(s) Oral every 6 hours PRN Mild Pain (1 - 3)  aluminum hydroxide/magnesium hydroxide/simethicone Suspension 30 milliLiter(s) Oral every 4 hours PRN Dyspepsia  dextrose 40% Gel 15 Gram(s) Oral once PRN Blood Glucose LESS THAN 70 milliGRAM(s)/deciliter  glucagon  Injectable 1 milliGRAM(s) IntraMuscular once PRN Glucose LESS THAN 70 milligrams/deciliter  nitroglycerin     SubLingual 0.4 milliGRAM(s) SubLingual every 5 minutes PRN Chest Pain      Allergies    No Known Allergies    Intolerances            Vital Signs Last 24 Hrs  T(C): 36.7 (26 May 2019 07:45), Max: 37.1 (25 May 2019 11:14)  T(F): 98 (26 May 2019 07:45), Max: 98.8 (25 May 2019 16:01)  HR: 66 (26 May 2019 07:45) (62 - 76)  BP: 123/80 (26 May 2019 07:45) (111/69 - 150/53)  BP(mean): --  RR: 21 (26 May 2019 07:45) (18 - 21)  SpO2: 96% (26 May 2019 07:45) (95% - 99%)    I&O's Summary    25 May 2019 07:01  -  26 May 2019 07:00  --------------------------------------------------------  IN: 840 mL / OUT: 2650 mL / NET: -1810 mL          PHYSICAL EXAM:  TELE: SR 60-70s bpm  Constitutional: NAD, awake and alert, well-developed  HEENT: Moist Mucous Membranes, Anicteric  Pulmonary: Non-labored, breath sounds are clear bilaterally, No wheezing, rales or rhonchi  Cardiovascular: IRRegular, S1 and S2, No murmurs, rubs, gallops or clicks  Gastrointestinal: Bowel Sounds present, soft, nontender.   Lymph: mild, non pitting , bl le peripheral edema. No lymphadenopathy.  Skin: left foot  dressing in place   Psych:  Mood & affect appropriate    LABS: All Labs Reviewed:        TPro  5.7    /  Alb  2.4    /  TBili  0.2    /  DBili  .10    /  AST  15     /  ALT  26     /  AlkPhos  94     24 May 2019 07:22    < from: TTE Echo Doppler w/o Cont (05.16.19 @ 09:45) >     EXAM:  ECHO TTE WO CON COMP W DOPPLR         PROCEDURE DATE:  05/16/2019        INTERPRETATION:  INDICATION: Elevated troponin    Blood Pressure 137/62    Height 187.96 cm     Weight 81.6 kg       BSA   2.1 sq m    Dimensions:    LA 4.1       Normal Values: 2.0 - 4.0 cm    Ao 3.6        Normal Values: 2.0 - 3.8 cm  SEPTUM 1.3       Normal Values: 0.6 - 1.2 cm  PWT 1.3       Normal Values: 0.6 - 1.1 cm  LVIDd 6.0         Normal Values: 3.0 - 5.6 cm  LVIDs 3.1         Normal Values: 1.8 - 4.0 cm      OBSERVATIONS:    Mitral Valve: Thickened leaflets, moderate MR.  Aortic Valve/Aorta: Sclerotic trileaflet aortic valve with normal   opening. Trace AI  Tricuspid Valve: normal with mild TR.  Pulmonic Valve: normal  Left Atrium: Mildly dilated  Right Atrium: normal  Left Ventricle: normal LV size and systolic function, estimated LVEF of   65%. No evidence of segmental dysfunction. Basal septal hypertrophy is   present  Right Ventricle: normal size and systolic function.  Pericardium/Pleura: normal, no significant pericardial effusion.    Conclusion:     normal LV size and systolic function, estimated LVEF of 65%. No evidence   of segmental dysfunction  Normal RV size and systolic function.   Mild left atrial enlargement  Sclerotic trileaflet aortic valve with normal opening. Trace AI  Mitral valve with thickened leaflets and moderate MR  Mild TR  No significant pericardial effusion.        < end of copied text >    < from: 12 Lead ECG (05.22.19 @ 11:35) >  Ventricular Rate 65 BPM    Atrial Rate 65 BPM    P-R Interval 132 ms    QRS Duration 86 ms    Q-T Interval 428 ms    QTC Calculation(Bezet) 445 ms    P Axis 37 degrees    R Axis 23 degrees    T Axis 57 degrees    < end of copied text >      < from: US Duplex Venous Upper Ext Ltd, Right (05.15.19 @ 11:26) >  EXAM:  US DPLX UPR EXT VEINS LTD RT                            PROCEDURE DATE:  05/15/2019          INTERPRETATION:  CLINICAL INFORMATION: Right upper extremity swelling.    COMPARISON: None available.    TECHNIQUE: Duplex sonography of the RIGHT UPPER extremity with color and   spectral Doppler, with and without compression.      FINDINGS:    The right internal jugular, subclavian, axillary, brachial, basilic and   cephalic veins are patent and compressible where applicable.     Doppler examination shows normal spontaneous and phasic flow.    IMPRESSION:     No evidence of right upper extremity deep venous thrombosis.    < end of copied text >    MR foot     IMPRESSION:   First distal metatarsal osteomyelitis, with enhancement in the   surrounding soft tissues. Soft tissue wound/ulceration, no focal soft   tissue abscess.  Chronic appearing irregularity at the distal fifth metatarsal, without   acute bone marrow edema enhancement.  Chronic-appearing deformity of the distal second metatarsal, with   dislocation at the second MTP joint.

## 2019-05-26 NOTE — CHART NOTE - NSCHARTNOTEFT_GEN_A_CORE
Patient's repeat Hg 7.5, drop from 10.1 1 week ago. Patient with h/o recent NSTEMI, on full AC, pre-op- discussed with cardio will give 1 unit PRBC and stop full dose Lovenox. Patient has been in sinus rhythm and does not require full dose AC at this time and risks>benefits in setting of anemia. SCDs for DVT prophylaxis.

## 2019-05-26 NOTE — PROGRESS NOTE ADULT - SUBJECTIVE AND OBJECTIVE BOX
Patient is a 48y old  Male who presents with a chief complaint of perforated stomach ulcer (26 May 2019 08:04)      INTERVAL HPI/OVERNIGHT EVENTS: Patient noted to have Hg 7.5, last checked 1 week ago and was 10.1. Patient denies any bleeding (hematuria, hematochezia etc), dizziness, lightheadedness, chest pain, SOB and is HD stable.      T(C): 36.7 (05-26-19 @ 07:45), Max: 37.1 (05-25-19 @ 11:14)  HR: 66 (05-26-19 @ 07:45) (62 - 76)  BP: 123/80 (05-26-19 @ 07:45) (111/69 - 150/53)  RR: 21 (05-26-19 @ 07:45) (18 - 21)  SpO2: 96% (05-26-19 @ 07:45) (95% - 99%)    I&O's Summary    25 May 2019 07:01  -  26 May 2019 07:00  --------------------------------------------------------  IN: 840 mL / OUT: 2650 mL / NET: -1810 mL      LABS:                        7.5    7.62  )-----------( 351      ( 26 May 2019 08:22 )             23.4     05-26    141  |  107  |  25<H>  ----------------------------<  153<H>  4.1   |  28  |  1.40<H>    Ca    8.2<L>      26 May 2019 08:22  Phos  3.7     05-26  Mg     2.0     05-26    TPro  6.1  /  Alb  2.4<L>  /  TBili  0.2  /  DBili  x   /  AST  18  /  ALT  24  /  AlkPhos  100  05-26    PT/INR - ( 26 May 2019 08:22 )   PT: 13.0 sec;   INR: 1.15 ratio         PTT - ( 26 May 2019 08:22 )  PTT:34.6 sec    CAPILLARY BLOOD GLUCOSE      POCT Blood Glucose.: 172 mg/dL (26 May 2019 07:58)  POCT Blood Glucose.: 220 mg/dL (25 May 2019 22:54)  POCT Blood Glucose.: 231 mg/dL (25 May 2019 17:02)  POCT Blood Glucose.: 188 mg/dL (25 May 2019 12:12)      MEDICATIONS  (STANDING):  amiodarone    Tablet   Oral   amiodarone    Tablet 200 milliGRAM(s) Oral daily  chlorhexidine 2% Cloths 1 Application(s) Topical daily  dextrose 5%. 1000 milliLiter(s) (50 mL/Hr) IV Continuous <Continuous>  dextrose 50% Injectable 12.5 Gram(s) IV Push once  dextrose 50% Injectable 25 Gram(s) IV Push once  dextrose 50% Injectable 25 Gram(s) IV Push once  enoxaparin Injectable 80 milliGRAM(s) SubCutaneous two times a day  insulin glargine Injectable (LANTUS) 10 Unit(s) SubCutaneous at bedtime  insulin lispro (HumaLOG) corrective regimen sliding scale   SubCutaneous Before meals and at bedtime  nicotine -  14 mG/24Hr(s) Patch 1 patch Transdermal daily  pantoprazole    Tablet 40 milliGRAM(s) Oral two times a day  tamsulosin 0.8 milliGRAM(s) Oral at bedtime    MEDICATIONS  (PRN):  acetaminophen   Tablet .. 650 milliGRAM(s) Oral every 6 hours PRN Mild Pain (1 - 3)  aluminum hydroxide/magnesium hydroxide/simethicone Suspension 30 milliLiter(s) Oral every 4 hours PRN Dyspepsia  dextrose 40% Gel 15 Gram(s) Oral once PRN Blood Glucose LESS THAN 70 milliGRAM(s)/deciliter  glucagon  Injectable 1 milliGRAM(s) IntraMuscular once PRN Glucose LESS THAN 70 milligrams/deciliter  nitroglycerin     SubLingual 0.4 milliGRAM(s) SubLingual every 5 minutes PRN Chest Pain      REVIEW OF SYSTEMS:  CONSTITUTIONAL: No fever, or fatigue  RESPIRATORY: No cough, shortness of breath  CARDIOVASCULAR: No chest pain, palpitations, dizziness  GASTROINTESTINAL: No abdominal or epigastric pain. No nausea, vomiting, or hematemesis; No melena or hematochezia.  GENITOURINARY: No hematuria    RADIOLOGY & ADDITIONAL TESTS:    Imaging Personally Reviewed:  [ ] YES  [ ] NO    Consultant(s) Notes Reviewed:  [x] YES  [ ] NO    PHYSICAL EXAM:  GENERAL: NAD, well-groomed, well-developed  HEAD:  Atraumatic, Normocephalic  EYES: EOMI, conjunctiva and sclera clear  ENMT: Moist mucous membranes  NERVOUS SYSTEM:  Alert & Oriented X3, Good concentration  CHEST/LUNG: Clear to percussion bilaterally; No rales, rhonchi, wheezing, or rubs  HEART: Regular rate and rhythm; No murmurs, rubs, or gallops  ABDOMEN: Soft, Nontender, Nondistended; Bowel sounds present  EXTREMITIES:  1-2+ pitting edema B/L LE, 2+ Peripheral Pulses, No cyanosis  SKIN: Warm, well perfused    Care Discussed with Consultants/Other Providers [ ] YES  [ ] NO

## 2019-05-26 NOTE — PROGRESS NOTE ADULT - ASSESSMENT
48 year old male with HTN and DM2, who presented with Perforated Antral Gastric Ulcer, s/p Emergent Exp-lap Oomentopexy and plication 5/11. Found to have new onset afib with elevated troponin post-operatively indicative of NSTEMI, started on Amiodarone and converted to sinus rhythm, now with left foot osteo plan for OR 5/28.

## 2019-05-26 NOTE — PROGRESS NOTE ADULT - PROBLEM SELECTOR PLAN 3
-on PO amio  -given age, hopefully can relegate to relatively short course  -on full dose lovenox, drop in H/H noted, to repeat, if persistently low will d/c full dose lovenox as A-fib was short lived and patient has been in sinus rhythm

## 2019-05-27 ENCOUNTER — TRANSCRIPTION ENCOUNTER (OUTPATIENT)
Age: 49
End: 2019-05-27

## 2019-05-27 LAB
ANION GAP SERPL CALC-SCNC: 6 MMOL/L — SIGNIFICANT CHANGE UP (ref 5–17)
BUN SERPL-MCNC: 22 MG/DL — SIGNIFICANT CHANGE UP (ref 7–23)
CALCIUM SERPL-MCNC: 8.3 MG/DL — LOW (ref 8.5–10.1)
CHLORIDE SERPL-SCNC: 106 MMOL/L — SIGNIFICANT CHANGE UP (ref 96–108)
CO2 SERPL-SCNC: 30 MMOL/L — SIGNIFICANT CHANGE UP (ref 22–31)
CREAT SERPL-MCNC: 1.3 MG/DL — SIGNIFICANT CHANGE UP (ref 0.5–1.3)
GLUCOSE SERPL-MCNC: 108 MG/DL — HIGH (ref 70–99)
HCT VFR BLD CALC: 25.6 % — LOW (ref 39–50)
HGB BLD-MCNC: 8.1 G/DL — LOW (ref 13–17)
MCHC RBC-ENTMCNC: 30.8 PG — SIGNIFICANT CHANGE UP (ref 27–34)
MCHC RBC-ENTMCNC: 31.6 GM/DL — LOW (ref 32–36)
MCV RBC AUTO: 97.3 FL — SIGNIFICANT CHANGE UP (ref 80–100)
NRBC # BLD: 0 /100 WBCS — SIGNIFICANT CHANGE UP (ref 0–0)
PLATELET # BLD AUTO: 431 K/UL — HIGH (ref 150–400)
POTASSIUM SERPL-MCNC: 4.1 MMOL/L — SIGNIFICANT CHANGE UP (ref 3.5–5.3)
POTASSIUM SERPL-SCNC: 4.1 MMOL/L — SIGNIFICANT CHANGE UP (ref 3.5–5.3)
RBC # BLD: 2.63 M/UL — LOW (ref 4.2–5.8)
RBC # FLD: 14.2 % — SIGNIFICANT CHANGE UP (ref 10.3–14.5)
SODIUM SERPL-SCNC: 142 MMOL/L — SIGNIFICANT CHANGE UP (ref 135–145)
WBC # BLD: 7.8 K/UL — SIGNIFICANT CHANGE UP (ref 3.8–10.5)
WBC # FLD AUTO: 7.8 K/UL — SIGNIFICANT CHANGE UP (ref 3.8–10.5)

## 2019-05-27 PROCEDURE — 99232 SBSQ HOSP IP/OBS MODERATE 35: CPT

## 2019-05-27 RX ADMIN — AMIODARONE HYDROCHLORIDE 200 MILLIGRAM(S): 400 TABLET ORAL at 06:13

## 2019-05-27 RX ADMIN — Medication 2: at 17:29

## 2019-05-27 RX ADMIN — PANTOPRAZOLE SODIUM 40 MILLIGRAM(S): 20 TABLET, DELAYED RELEASE ORAL at 06:14

## 2019-05-27 RX ADMIN — Medication 4: at 11:33

## 2019-05-27 RX ADMIN — Medication 1 PATCH: at 11:35

## 2019-05-27 RX ADMIN — PANTOPRAZOLE SODIUM 40 MILLIGRAM(S): 20 TABLET, DELAYED RELEASE ORAL at 17:29

## 2019-05-27 RX ADMIN — Medication 1 PATCH: at 19:38

## 2019-05-27 RX ADMIN — CHLORHEXIDINE GLUCONATE 1 APPLICATION(S): 213 SOLUTION TOPICAL at 11:34

## 2019-05-27 RX ADMIN — INSULIN GLARGINE 10 UNIT(S): 100 INJECTION, SOLUTION SUBCUTANEOUS at 21:34

## 2019-05-27 RX ADMIN — TAMSULOSIN HYDROCHLORIDE 0.8 MILLIGRAM(S): 0.4 CAPSULE ORAL at 21:34

## 2019-05-27 RX ADMIN — Medication 4: at 21:34

## 2019-05-27 RX ADMIN — Medication 1 PATCH: at 11:34

## 2019-05-27 RX ADMIN — Medication 1 PATCH: at 07:46

## 2019-05-27 NOTE — PROGRESS NOTE ADULT - PROBLEM SELECTOR PLAN 6
VTE prophylaxis: Off full dose AC at this time as risks>benefits in setting of anemia, prophylactic Lovenox on hold for planned surgery tomorrow. SCDs for now.

## 2019-05-27 NOTE — PROGRESS NOTE ADULT - ASSESSMENT
48 year old male with HTN and DM2, who presented with Perforated Antral Gastric Ulcer, POD 5 s/p Emergent Exp-lap Oomentopexy and plication. He had an RRT for atrial fibrillation with rapid ventricular response, which is new. He has had difficult to control periods of tachycardia and bradycardia, currently in a sinus rhythm this morning   He had an elevated troponin possibly suggestive of non-ST segment elevation myocardial infarction/demand ischemia. He has no known history of coronary artery disease    Afib  - Pt. remains in SR.  He converted to NSR from AFib, no further events on tele   - Continue Amio, plan is to stop Amio as an outpatient  - We can consider BB once off Amio as outpatient   - TTE with normal LV function, moderate MR   - Full dose Lovenox DC yesterday because of drop in H/H and Afib was short lived ( no need for long term AC)  - today H/H improved at 8.1/25.6, Lovenox on hold for surgery tomorrow    - If no recurrence of Afib while inpatient, we recommend an event monitor as outpatient.  - Monitor s/s bleeding, H/H stable, cont to monitor CBC  -  Maintain  K>4, Mg>2  - vs stable as per flow sheet   - cont to monitor on tele     S/P omentopexy and plication of gastric ulcer   - Tolerated procedure well, however, had Afib postop, spontaneously to NSR  - Pain control  - Ambulate with PT    - MRI now with left foot osteomyelitis and he may need left hallux resection with debridement on Tuesday 5/28 ( as per podiatry)   - No cardiac contraindication to proceeding at this time. okay to proceed with surgery with routine cardiovascular monitoring   - HOLD Subct Lovenox pre op   - All other workup per Primary  - Will continue to follow       Bee Cueto ANP-C  Cardiology 48 year old male with HTN and DM2, who presented with Perforated Antral Gastric Ulcer, POD 5 s/p Emergent Exp-lap Oomentopexy and plication. He had an RRT for atrial fibrillation with rapid ventricular response, which is new. He has had difficult to control periods of tachycardia and bradycardia, currently in a sinus rhythm this morning   He had an elevated troponin possibly suggestive of non-ST segment elevation myocardial infarction/demand ischemia. He has no known history of coronary artery disease    Afib  - Pt. remains in SR.  He converted to NSR from AFib, no further events on tele   - Continue Amio, plan is to stop Amio as an outpatient  - We can consider BB once off Amiodarone  - TTE with normal LV function, moderate MR   - Full dose Lovenox DC yesterday because of drop in H/H and Afib was short lived ( no need for long term AC)  - today H/H improved at 8.1/25.6, Lovenox on hold for surgery tomorrow    - If no recurrence of Afib while inpatient, we recommend an event monitor as outpatient.  - Monitor s/s bleeding, H/H stable, cont to monitor CBC  - Maintain  K>4, Mg>2  - vs stable as per flow sheet   - cont to monitor on tele     S/P omentopexy and plication of gastric ulcer   - Tolerated procedure well, however, had Afib postop, spontaneously to NSR  - Pain control  - Ambulate with PT    - MRI now with left foot osteomyelitis and he may need left hallux resection with debridement on Tuesday 5/28 (as per podiatry)   - No cardiac contraindication to proceeding at this time. okay to proceed with surgery with routine cardiovascular monitoring   - All other workup per Primary. Will continue to follow       Bee Cueto , ANP-C  Cardiology

## 2019-05-27 NOTE — PROGRESS NOTE ADULT - PROBLEM SELECTOR PLAN 3
-on PO amio  -given age, hopefully can relegate to relatively short course, plan is to stop Amio as an outpatient  -Off full dose AC at this time as risks>benefits in setting of anemia, prophylactic Lovenox on hold for planned surgery tomorrow.

## 2019-05-27 NOTE — PROGRESS NOTE ADULT - SUBJECTIVE AND OBJECTIVE BOX
[facility] -- Bolivar Carbajal, El, Brittany, Reynaldo Sweeney Savella  Office # [phone]      Follow Up:  Afib     Subjective/Observations: Patient seen and examined.  Pt. resting comfortably in bed in NAD, with no respiratory distress, no chest pain, dyspnea, palpitations, PND, or orthopnea.        REVIEW OF SYSTEMS: All other review of systems is negative unless indicated above    PAST MEDICAL & SURGICAL HISTORY:  Diabetes mellitus with no complication  No significant past surgical history      MEDICATIONS  (STANDING):  amiodarone    Tablet   Oral   amiodarone    Tablet 200 milliGRAM(s) Oral daily  chlorhexidine 2% Cloths 1 Application(s) Topical daily  dextrose 5%. 1000 milliLiter(s) (50 mL/Hr) IV Continuous <Continuous>  dextrose 50% Injectable 12.5 Gram(s) IV Push once  dextrose 50% Injectable 25 Gram(s) IV Push once  dextrose 50% Injectable 25 Gram(s) IV Push once  insulin glargine Injectable (LANTUS) 10 Unit(s) SubCutaneous at bedtime  insulin lispro (HumaLOG) corrective regimen sliding scale   SubCutaneous Before meals and at bedtime  nicotine -  14 mG/24Hr(s) Patch 1 patch Transdermal daily  pantoprazole    Tablet 40 milliGRAM(s) Oral two times a day  tamsulosin 0.8 milliGRAM(s) Oral at bedtime    MEDICATIONS  (PRN):  acetaminophen   Tablet .. 650 milliGRAM(s) Oral every 6 hours PRN Mild Pain (1 - 3)  aluminum hydroxide/magnesium hydroxide/simethicone Suspension 30 milliLiter(s) Oral every 4 hours PRN Dyspepsia  dextrose 40% Gel 15 Gram(s) Oral once PRN Blood Glucose LESS THAN 70 milliGRAM(s)/deciliter  glucagon  Injectable 1 milliGRAM(s) IntraMuscular once PRN Glucose LESS THAN 70 milligrams/deciliter  nitroglycerin     SubLingual 0.4 milliGRAM(s) SubLingual every 5 minutes PRN Chest Pain      Allergies    No Known Allergies    Intolerances            Vital Signs Last 24 Hrs  T(C): 36.9 (27 May 2019 07:22), Max: 37.5 (26 May 2019 15:15)  T(F): 98.4 (27 May 2019 07:22), Max: 99.5 (26 May 2019 15:15)  HR: 67 (27 May 2019 07:22) (61 - 75)  BP: 124/79 (27 May 2019 07:22) (108/68 - 129/76)  BP(mean): --  RR: 17 (27 May 2019 07:22) (17 - 20)  SpO2: 93% (27 May 2019 07:22) (93% - 98%)    I&O's Summary    26 May 2019 07:01  -  27 May 2019 07:00  --------------------------------------------------------  IN: 285 mL / OUT: 4900 mL / NET: -4615 mL          PHYSICAL EXAM:  TELE: Sinus Ulises 50's   Constitutional: NAD, awake and alert, well-developed  HEENT: Moist Mucous Membranes, Anicteric  Pulmonary: Non-labored, breath sounds are clear bilaterally, No wheezing, rales or rhonchi  Cardiovascular: Regular, S1 and S2, No murmurs, rubs, gallops or clicks  Gastrointestinal: Bowel Sounds present, soft, nontender.   Lymph: No peripheral edema. No lymphadenopathy.  Skin: left foot dressing in place   Psych:  Mood & affect appropriate    LABS: All Labs Reviewed:                        8.1    7.80  )-----------( 431      ( 27 May 2019 06:54 )             25.6                         7.5    7.83  )-----------( 408      ( 26 May 2019 12:03 )             22.7                         7.5    7.62  )-----------( 351      ( 26 May 2019 08:22 )             23.4     27 May 2019 06:54    142    |  106    |  22     ----------------------------<  108    4.1     |  30     |  1.30   26 May 2019 08:22    141    |  107    |  25     ----------------------------<  153    4.1     |  28     |  1.40     Ca    8.3        27 May 2019 06:54  Ca    8.2        26 May 2019 08:22  Phos  3.7       26 May 2019 08:22  Mg     2.0       26 May 2019 08:22    TPro  6.1    /  Alb  2.4    /  TBili  0.2    /  DBili  x      /  AST  18     /  ALT  24     /  AlkPhos  100    26 May 2019 08:22    PT/INR - ( 26 May 2019 08:22 )   PT: 13.0 sec;   INR: 1.15 ratio         PTT - ( 26 May 2019 08:22 )  PTT:34.6 sec       < from: TTE Echo Doppler w/o Cont (05.16.19 @ 09:45) >     EXAM:  ECHO TTE WO CON COMP W DOPPLR         PROCEDURE DATE:  05/16/2019        INTERPRETATION:  INDICATION: Elevated troponin    Blood Pressure 137/62    Height 187.96 cm     Weight 81.6 kg       BSA   2.1 sq m    Dimensions:    LA 4.1       Normal Values: 2.0 - 4.0 cm    Ao 3.6        Normal Values: 2.0 - 3.8 cm  SEPTUM 1.3       Normal Values: 0.6 - 1.2 cm  PWT 1.3       Normal Values: 0.6 - 1.1 cm  LVIDd 6.0         Normal Values: 3.0 - 5.6 cm  LVIDs 3.1         Normal Values: 1.8 - 4.0 cm      OBSERVATIONS:    Mitral Valve: Thickened leaflets, moderate MR.  Aortic Valve/Aorta: Sclerotic trileaflet aortic valve with normal   opening. Trace AI  Tricuspid Valve: normal with mild TR.  Pulmonic Valve: normal  Left Atrium: Mildly dilated  Right Atrium: normal  Left Ventricle: normal LV size and systolic function, estimated LVEF of   65%. No evidence of segmental dysfunction. Basal septal hypertrophy is   present  Right Ventricle: normal size and systolic function.  Pericardium/Pleura: normal, no significant pericardial effusion.    Conclusion:     normal LV size and systolic function, estimated LVEF of 65%. No evidence   of segmental dysfunction  Normal RV size and systolic function.   Mild left atrial enlargement  Sclerotic trileaflet aortic valve with normal opening. Trace AI  Mitral valve with thickened leaflets and moderate MR  Mild TR  No significant pericardial effusion.        < end of copied text >    < from: 12 Lead ECG (05.22.19 @ 11:35) >  Ventricular Rate 65 BPM    Atrial Rate 65 BPM    P-R Interval 132 ms    QRS Duration 86 ms    Q-T Interval 428 ms    QTC Calculation(Bezet) 445 ms    P Axis 37 degrees    R Axis 23 degrees    T Axis 57 degrees    < end of copied text >      < from: US Duplex Venous Upper Ext Ltd, Right (05.15.19 @ 11:26) >  EXAM:  US DPLX UPR EXT VEINS LTD RT                            PROCEDURE DATE:  05/15/2019          INTERPRETATION:  CLINICAL INFORMATION: Right upper extremity swelling.    COMPARISON: None available.    TECHNIQUE: Duplex sonography of the RIGHT UPPER extremity with color and   spectral Doppler, with and without compression.      FINDINGS:    The right internal jugular, subclavian, axillary, brachial, basilic and   cephalic veins are patent and compressible where applicable.     Doppler examination shows normal spontaneous and phasic flow.    IMPRESSION:     No evidence of right upper extremity deep venous thrombosis.    < end of copied text >    MR foot     IMPRESSION:   First distal metatarsal osteomyelitis, with enhancement in the   surrounding soft tissues. Soft tissue wound/ulceration, no focal soft   tissue abscess.  Chronic appearing irregularity at the distal fifth metatarsal, without   acute bone marrow edema enhancement.  Chronic-appearing deformity of the distal second metatarsal, with   dislocation at the second MTP joint.

## 2019-05-27 NOTE — PROGRESS NOTE ADULT - PROBLEM SELECTOR PLAN 5
remains resolved at present  ekg/tele nsr, without st or t changes  sublingual nitro prn now   follow up cardio recs

## 2019-05-27 NOTE — PROGRESS NOTE ADULT - SUBJECTIVE AND OBJECTIVE BOX
Resident Progress Note    Chief Complaint: Patient is a 48y old Male who presents with a chief complaint of perforated stomach ulcer (27 May 2019 08:29)    HPI: Patient seen and examined at bedside. No acute events overnight. s/p 1 unit of pRBCs yesterday 5/26, H/H stable this AM. Off full dose AC at this time as risks>benefits in setting of anemia. No clinical signs or symptoms of active bleeding currently. No acute complaints at this time.     ROS:  Constitutional: denies fever, chills, diaphoresis, fatigue  Respiratory: denies SOB, DUBOIS, cough, wheezing, hemoptysis  Cardiovascular: denies chest pain, palpitations, edema  Gastrointestinal: denies nausea, vomiting, diarrhea, constipation, abdominal pain, melena, hematochezia   Genitourinary: denies dysuria, frequency, urgency, hematuria   Musculoskeletal: denies myalgias, joint swelling, muscle weakness  Neurologic: denies headache, weakness, dizziness, paresthesias, numbness/tingling   ROS negative except as noted above    Vital Signs Last 24 Hrs  T(C): 37.8 (05-27-19 @ 11:46), Max: 37.8 (05-27-19 @ 11:46)  T(F): 100 (05-27-19 @ 11:46), Max: 100 (05-27-19 @ 11:46)  HR: 66 (05-27-19 @ 11:46) (61 - 75)  BP: 112/67 (05-27-19 @ 11:46) (108/68 - 129/76)  RR: 16 (05-27-19 @ 11:46) (16 - 20)  SpO2: 96% (05-27-19 @ 11:46) (93% - 98%)    Physical Exam:  General: Well developed, well nourished, in no acute distress  HEENT: NCAT, moist mucous membranes   Neurology: A&Ox3, nonfocal  Respiratory: CTA B/L, No wheezing, rales, or rhonchi  CV: RRR, S1/S2 present, no murmurs  Abdominal: Soft, nontender, non-distended, normoactive bowel sounds  Extremities: No C/C/E, peripheral pulses present, Left foot dressing clean, dry, intact  MSK: Normal ROM, no joint erythema or warmth, no joint swelling   Skin: warm, dry, normal color, no obvious rash or abnormal lesions    LABS:                        8.1    7.80  )-----------( 431      ( 27 May 2019 06:54 )             25.6     27 May 2019 06:54    142    |  106    |  22     ----------------------------<  108    4.1     |  30     |  1.30     Ca    8.3        27 May 2019 06:54    PT/INR - ( 26 May 2019 08:22 )   PT: 13.0 sec;   INR: 1.15 ratio      PTT - ( 26 May 2019 08:22 )  PTT:34.6 sec    CAPILLARY BLOOD GLUCOSE  POCT Blood Glucose.: 230 mg/dL (27 May 2019 11:32)  POCT Blood Glucose.: 139 mg/dL (27 May 2019 07:49)  POCT Blood Glucose.: 294 mg/dL (26 May 2019 22:02)  POCT Blood Glucose.: 182 mg/dL (26 May 2019 17:09)      RADIOLOGY & ADDITIONAL TESTS: None new.      MEDICATIONS  (STANDING):  amiodarone    Tablet   Oral   amiodarone    Tablet 200 milliGRAM(s) Oral daily  chlorhexidine 2% Cloths 1 Application(s) Topical daily  dextrose 5%. 1000 milliLiter(s) (50 mL/Hr) IV Continuous <Continuous>  dextrose 50% Injectable 12.5 Gram(s) IV Push once  dextrose 50% Injectable 25 Gram(s) IV Push once  dextrose 50% Injectable 25 Gram(s) IV Push once  insulin glargine Injectable (LANTUS) 10 Unit(s) SubCutaneous at bedtime  insulin lispro (HumaLOG) corrective regimen sliding scale   SubCutaneous Before meals and at bedtime  nicotine -  14 mG/24Hr(s) Patch 1 patch Transdermal daily  pantoprazole    Tablet 40 milliGRAM(s) Oral two times a day  tamsulosin 0.8 milliGRAM(s) Oral at bedtime    MEDICATIONS  (PRN):  acetaminophen   Tablet .. 650 milliGRAM(s) Oral every 6 hours PRN Mild Pain (1 - 3)  aluminum hydroxide/magnesium hydroxide/simethicone Suspension 30 milliLiter(s) Oral every 4 hours PRN Dyspepsia  dextrose 40% Gel 15 Gram(s) Oral once PRN Blood Glucose LESS THAN 70 milliGRAM(s)/deciliter  glucagon  Injectable 1 milliGRAM(s) IntraMuscular once PRN Glucose LESS THAN 70 milligrams/deciliter  nitroglycerin     SubLingual 0.4 milliGRAM(s) SubLingual every 5 minutes PRN Chest Pain      Allergies    No Known Allergies    Intolerances

## 2019-05-27 NOTE — PROGRESS NOTE ADULT - ASSESSMENT
48 year old male with PMH of  HTN and DM2, who presented with Perforated Antral Gastric Ulcer, s/p Emergent Exp-lap Oomentopexy and plication 5/11. Found to have new onset afib with elevated troponin post-operatively indicative of NSTEMI, started on Amiodarone and converted to sinus rhythm, now with left foot osteo plan for OR 5/28.

## 2019-05-27 NOTE — PROGRESS NOTE ADULT - PROBLEM SELECTOR PLAN 4
importance of compliance discussed  -Continue Lantus 10 units qhs.  -Hypoglycemia Protocol, SINA, Gogo AC&HS.  -Diet: Consistent Carbs w/ Evening Snack.

## 2019-05-28 ENCOUNTER — RESULT REVIEW (OUTPATIENT)
Age: 49
End: 2019-05-28

## 2019-05-28 DIAGNOSIS — R33.9 RETENTION OF URINE, UNSPECIFIED: ICD-10-CM

## 2019-05-28 LAB
ANION GAP SERPL CALC-SCNC: 5 MMOL/L — SIGNIFICANT CHANGE UP (ref 5–17)
BUN SERPL-MCNC: 22 MG/DL — SIGNIFICANT CHANGE UP (ref 7–23)
CALCIUM SERPL-MCNC: 8.3 MG/DL — LOW (ref 8.5–10.1)
CHLORIDE SERPL-SCNC: 108 MMOL/L — SIGNIFICANT CHANGE UP (ref 96–108)
CO2 SERPL-SCNC: 29 MMOL/L — SIGNIFICANT CHANGE UP (ref 22–31)
CREAT SERPL-MCNC: 1.2 MG/DL — SIGNIFICANT CHANGE UP (ref 0.5–1.3)
GLUCOSE SERPL-MCNC: 136 MG/DL — HIGH (ref 70–99)
HCT VFR BLD CALC: 24.5 % — LOW (ref 39–50)
HGB BLD-MCNC: 8 G/DL — LOW (ref 13–17)
MCHC RBC-ENTMCNC: 31.3 PG — SIGNIFICANT CHANGE UP (ref 27–34)
MCHC RBC-ENTMCNC: 32.7 GM/DL — SIGNIFICANT CHANGE UP (ref 32–36)
MCV RBC AUTO: 95.7 FL — SIGNIFICANT CHANGE UP (ref 80–100)
NRBC # BLD: 0 /100 WBCS — SIGNIFICANT CHANGE UP (ref 0–0)
PLATELET # BLD AUTO: 437 K/UL — HIGH (ref 150–400)
POTASSIUM SERPL-MCNC: 3.9 MMOL/L — SIGNIFICANT CHANGE UP (ref 3.5–5.3)
POTASSIUM SERPL-SCNC: 3.9 MMOL/L — SIGNIFICANT CHANGE UP (ref 3.5–5.3)
RBC # BLD: 2.56 M/UL — LOW (ref 4.2–5.8)
RBC # FLD: 13.9 % — SIGNIFICANT CHANGE UP (ref 10.3–14.5)
SODIUM SERPL-SCNC: 142 MMOL/L — SIGNIFICANT CHANGE UP (ref 135–145)
WBC # BLD: 7.04 K/UL — SIGNIFICANT CHANGE UP (ref 3.8–10.5)
WBC # FLD AUTO: 7.04 K/UL — SIGNIFICANT CHANGE UP (ref 3.8–10.5)

## 2019-05-28 PROCEDURE — 73630 X-RAY EXAM OF FOOT: CPT | Mod: 26,LT

## 2019-05-28 PROCEDURE — 99232 SBSQ HOSP IP/OBS MODERATE 35: CPT

## 2019-05-28 PROCEDURE — 88305 TISSUE EXAM BY PATHOLOGIST: CPT | Mod: 26

## 2019-05-28 PROCEDURE — 88311 DECALCIFY TISSUE: CPT | Mod: 26

## 2019-05-28 PROCEDURE — 88304 TISSUE EXAM BY PATHOLOGIST: CPT | Mod: 26

## 2019-05-28 RX ORDER — ONDANSETRON 8 MG/1
4 TABLET, FILM COATED ORAL ONCE
Refills: 0 | Status: DISCONTINUED | OUTPATIENT
Start: 2019-05-28 | End: 2019-05-28

## 2019-05-28 RX ORDER — CEFAZOLIN SODIUM 1 G
2000 VIAL (EA) INJECTION EVERY 8 HOURS
Refills: 0 | Status: DISCONTINUED | OUTPATIENT
Start: 2019-05-28 | End: 2019-05-31

## 2019-05-28 RX ORDER — SODIUM CHLORIDE 9 MG/ML
1000 INJECTION, SOLUTION INTRAVENOUS
Refills: 0 | Status: DISCONTINUED | OUTPATIENT
Start: 2019-05-28 | End: 2019-05-28

## 2019-05-28 RX ORDER — INSULIN LISPRO 100/ML
VIAL (ML) SUBCUTANEOUS
Refills: 0 | Status: DISCONTINUED | OUTPATIENT
Start: 2019-05-28 | End: 2019-06-05

## 2019-05-28 RX ORDER — INSULIN GLARGINE 100 [IU]/ML
10 INJECTION, SOLUTION SUBCUTANEOUS AT BEDTIME
Refills: 0 | Status: DISCONTINUED | OUTPATIENT
Start: 2019-05-28 | End: 2019-06-05

## 2019-05-28 RX ORDER — AMIODARONE HYDROCHLORIDE 400 MG/1
TABLET ORAL
Refills: 0 | Status: DISCONTINUED | OUTPATIENT
Start: 2019-05-28 | End: 2019-05-28

## 2019-05-28 RX ORDER — ACETAMINOPHEN 500 MG
1000 TABLET ORAL ONCE
Refills: 0 | Status: DISCONTINUED | OUTPATIENT
Start: 2019-05-28 | End: 2019-05-28

## 2019-05-28 RX ORDER — TAMSULOSIN HYDROCHLORIDE 0.4 MG/1
0.8 CAPSULE ORAL AT BEDTIME
Refills: 0 | Status: DISCONTINUED | OUTPATIENT
Start: 2019-05-28 | End: 2019-06-05

## 2019-05-28 RX ORDER — CHLORHEXIDINE GLUCONATE 213 G/1000ML
1 SOLUTION TOPICAL DAILY
Refills: 0 | Status: DISCONTINUED | OUTPATIENT
Start: 2019-05-28 | End: 2019-05-30

## 2019-05-28 RX ORDER — ACETAMINOPHEN 500 MG
650 TABLET ORAL EVERY 6 HOURS
Refills: 0 | Status: DISCONTINUED | OUTPATIENT
Start: 2019-05-28 | End: 2019-06-05

## 2019-05-28 RX ORDER — NICOTINE POLACRILEX 2 MG
1 GUM BUCCAL DAILY
Refills: 0 | Status: DISCONTINUED | OUTPATIENT
Start: 2019-05-28 | End: 2019-06-05

## 2019-05-28 RX ORDER — GLUCAGON INJECTION, SOLUTION 0.5 MG/.1ML
1 INJECTION, SOLUTION SUBCUTANEOUS ONCE
Refills: 0 | Status: DISCONTINUED | OUTPATIENT
Start: 2019-05-28 | End: 2019-06-05

## 2019-05-28 RX ORDER — DEXTROSE 50 % IN WATER 50 %
25 SYRINGE (ML) INTRAVENOUS ONCE
Refills: 0 | Status: DISCONTINUED | OUTPATIENT
Start: 2019-05-28 | End: 2019-06-05

## 2019-05-28 RX ORDER — HYDROMORPHONE HYDROCHLORIDE 2 MG/ML
0.25 INJECTION INTRAMUSCULAR; INTRAVENOUS; SUBCUTANEOUS ONCE
Refills: 0 | Status: DISCONTINUED | OUTPATIENT
Start: 2019-05-28 | End: 2019-05-28

## 2019-05-28 RX ORDER — DEXTROSE 50 % IN WATER 50 %
15 SYRINGE (ML) INTRAVENOUS ONCE
Refills: 0 | Status: DISCONTINUED | OUTPATIENT
Start: 2019-05-28 | End: 2019-06-05

## 2019-05-28 RX ORDER — NITROGLYCERIN 6.5 MG
0.4 CAPSULE, EXTENDED RELEASE ORAL
Refills: 0 | Status: DISCONTINUED | OUTPATIENT
Start: 2019-05-28 | End: 2019-06-05

## 2019-05-28 RX ORDER — SODIUM CHLORIDE 9 MG/ML
1000 INJECTION, SOLUTION INTRAVENOUS
Refills: 0 | Status: DISCONTINUED | OUTPATIENT
Start: 2019-05-28 | End: 2019-06-05

## 2019-05-28 RX ORDER — AMIODARONE HYDROCHLORIDE 400 MG/1
200 TABLET ORAL DAILY
Refills: 0 | Status: DISCONTINUED | OUTPATIENT
Start: 2019-05-29 | End: 2019-06-05

## 2019-05-28 RX ORDER — PANTOPRAZOLE SODIUM 20 MG/1
40 TABLET, DELAYED RELEASE ORAL
Refills: 0 | Status: DISCONTINUED | OUTPATIENT
Start: 2019-05-28 | End: 2019-06-05

## 2019-05-28 RX ORDER — DEXTROSE 50 % IN WATER 50 %
12.5 SYRINGE (ML) INTRAVENOUS ONCE
Refills: 0 | Status: DISCONTINUED | OUTPATIENT
Start: 2019-05-28 | End: 2019-06-05

## 2019-05-28 RX ADMIN — Medication 1 PATCH: at 11:36

## 2019-05-28 RX ADMIN — SODIUM CHLORIDE 75 MILLILITER(S): 9 INJECTION, SOLUTION INTRAVENOUS at 11:04

## 2019-05-28 RX ADMIN — AMIODARONE HYDROCHLORIDE 200 MILLIGRAM(S): 400 TABLET ORAL at 06:37

## 2019-05-28 RX ADMIN — CHLORHEXIDINE GLUCONATE 1 APPLICATION(S): 213 SOLUTION TOPICAL at 11:36

## 2019-05-28 RX ADMIN — TAMSULOSIN HYDROCHLORIDE 0.8 MILLIGRAM(S): 0.4 CAPSULE ORAL at 22:01

## 2019-05-28 RX ADMIN — Medication 100 MILLIGRAM(S): at 22:01

## 2019-05-28 RX ADMIN — Medication 1 PATCH: at 07:44

## 2019-05-28 RX ADMIN — Medication 4: at 22:15

## 2019-05-28 RX ADMIN — HYDROMORPHONE HYDROCHLORIDE 0.25 MILLIGRAM(S): 2 INJECTION INTRAMUSCULAR; INTRAVENOUS; SUBCUTANEOUS at 22:44

## 2019-05-28 RX ADMIN — PANTOPRAZOLE SODIUM 40 MILLIGRAM(S): 20 TABLET, DELAYED RELEASE ORAL at 06:37

## 2019-05-28 RX ADMIN — INSULIN GLARGINE 10 UNIT(S): 100 INJECTION, SOLUTION SUBCUTANEOUS at 22:01

## 2019-05-28 RX ADMIN — Medication 2: at 07:30

## 2019-05-28 RX ADMIN — PANTOPRAZOLE SODIUM 40 MILLIGRAM(S): 20 TABLET, DELAYED RELEASE ORAL at 18:36

## 2019-05-28 NOTE — BRIEF OPERATIVE NOTE - NSICDXBRIEFPOSTOP_GEN_ALL_CORE_FT
POST-OP DIAGNOSIS:  Other osteomyelitis of left foot 28-May-2019 17:01:22 left foot Rhoda Mccoy
POST-OP DIAGNOSIS:  Perforated gastric ulcer 11-May-2019 02:37:01  Ruperto Mejia

## 2019-05-28 NOTE — BRIEF OPERATIVE NOTE - NSICDXBRIEFPROCEDURE_GEN_ALL_CORE_FT
PROCEDURES:  Amputation, foot, ray 28-May-2019 17:00:07 left foot hallux amputation and partial ray resection Rhoda Gallardo

## 2019-05-28 NOTE — PROGRESS NOTE ADULT - PROBLEM SELECTOR PLAN 6
VTE prophylaxis: Off full dose AC at this time as risks>benefits in setting of anemia, prophylactic Lovenox on hold for planned surgery tomorrow. SCDs for now. chronic  c/w flomax

## 2019-05-28 NOTE — BRIEF OPERATIVE NOTE - NSICDXBRIEFPREOP_GEN_ALL_CORE_FT
PRE-OP DIAGNOSIS:  Other osteomyelitis of left foot 28-May-2019 17:00:57 left foot hallux and met Rhoda Mccoy
PRE-OP DIAGNOSIS:  Perforated gastric ulcer 11-May-2019 02:36:18  Ruperto Mejia

## 2019-05-28 NOTE — PROGRESS NOTE ADULT - SUBJECTIVE AND OBJECTIVE BOX
Patient is a 48y old  Male who presents with a chief complaint of perforated stomach ulcer (28 May 2019 08:12)      FROM ADMISSION H+P:   HPI:  48 year old man, had sudden onset at 7 PM of abdominal pain (11 May 2019 00:30)      ----  INTERVAL HPI/OVERNIGHT EVENTS: Pt seen and evaluated at the bedside. No acute overnight events occurred. no acute complaints    ----  PAST MEDICAL & SURGICAL HISTORY:  Diabetes mellitus with no complication  No significant past surgical history      FAMILY HISTORY:      ----  MEDICATIONS  (STANDING):  amiodarone    Tablet   Oral   amiodarone    Tablet 200 milliGRAM(s) Oral daily  chlorhexidine 2% Cloths 1 Application(s) Topical daily  dextrose 5%. 1000 milliLiter(s) (50 mL/Hr) IV Continuous <Continuous>  dextrose 50% Injectable 12.5 Gram(s) IV Push once  dextrose 50% Injectable 25 Gram(s) IV Push once  dextrose 50% Injectable 25 Gram(s) IV Push once  insulin glargine Injectable (LANTUS) 10 Unit(s) SubCutaneous at bedtime  insulin lispro (HumaLOG) corrective regimen sliding scale   SubCutaneous Before meals and at bedtime  nicotine -  14 mG/24Hr(s) Patch 1 patch Transdermal daily  pantoprazole    Tablet 40 milliGRAM(s) Oral two times a day  tamsulosin 0.8 milliGRAM(s) Oral at bedtime    MEDICATIONS  (PRN):  acetaminophen   Tablet .. 650 milliGRAM(s) Oral every 6 hours PRN Mild Pain (1 - 3)  aluminum hydroxide/magnesium hydroxide/simethicone Suspension 30 milliLiter(s) Oral every 4 hours PRN Dyspepsia  dextrose 40% Gel 15 Gram(s) Oral once PRN Blood Glucose LESS THAN 70 milliGRAM(s)/deciliter  glucagon  Injectable 1 milliGRAM(s) IntraMuscular once PRN Glucose LESS THAN 70 milligrams/deciliter  nitroglycerin     SubLingual 0.4 milliGRAM(s) SubLingual every 5 minutes PRN Chest Pain      ----  REVIEW OF SYSTEMS:  CONSTITUTIONAL: denies fever, chills, fatigue, weakness  HEENT: denies blurred vision, sore throat  SKIN: denies new lesions, rash  CARDIOVASCULAR: denies chest pain, chest pressure, palpitations  RESPIRATORY: denies shortness of breath, sputum production  GASTROINTESTINAL: denies nausea, vomiting, diarrhea, abdominal pain  GENITOURINARY: denies dysuria, discharge  NEUROLOGICAL: denies numbness, headache, focal weakness  MUSCULOSKELETAL: denies new joint pain, muscle aches      ----  PHYSICAL EXAM:  General: Well developed, well nourished, NAD  HEENT: NCAT, moist mucous membranes   Neurology: A&Ox3, nonfocal  Respiratory: CTA B/L, No wheezing, rales, or rhonchi  CV: RRR, S1/S2 present, no murmurs  Abdominal: Soft, nontender, non-distended, normoactive bowel sounds  Extremities: No C/C/E, peripheral pulses present, Left foot dressing clean, dry, intact  MSK: Normal ROM, no joint erythema or warmth, no joint swelling   Skin: warm, dry, normal color    T(C): 36.9 (05-28-19 @ 08:00), Max: 37.8 (05-27-19 @ 11:46)  HR: 63 (05-28-19 @ 08:00) (63 - 73)  BP: 113/68 (05-28-19 @ 08:00) (108/63 - 129/72)  RR: 18 (05-28-19 @ 08:00) (16 - 18)  SpO2: 64% (05-28-19 @ 08:00) (64% - 100%)  Wt(kg): --    ----  I&O's Summary    27 May 2019 07:01  -  28 May 2019 07:00  --------------------------------------------------------  IN: 600 mL / OUT: 4950 mL / NET: -4350 mL        LABS:                        8.0    7.04  )-----------( 437      ( 28 May 2019 05:44 )             24.5     05-28    142  |  108  |  22  ----------------------------<  136<H>  3.9   |  29  |  1.20    Ca    8.3<L>      28 May 2019 05:44          CAPILLARY BLOOD GLUCOSE      POCT Blood Glucose.: 169 mg/dL (28 May 2019 07:21)  POCT Blood Glucose.: 233 mg/dL (27 May 2019 21:24)  POCT Blood Glucose.: 197 mg/dL (27 May 2019 17:09)  POCT Blood Glucose.: 230 mg/dL (27 May 2019 11:32)                ----  Personally reviewed:  Vital sign trends: [ x ] yes    [  ] no     [  ] n/a  Laboratory results: [ x ] yes    [  ] no     [  ] n/a  Radiology results: [ x ] yes    [  ] no     [  ] n/a  Culture results: [x  ] yes    [  ] no     [  ] n/a  Consultant recommendations: [ x ] yes    [  ] no     [  ] n/a Patient is a 48y old  Male who presents with a chief complaint of perforated stomach ulcer (28 May 2019 08:12)      FROM ADMISSION H+P:   HPI:  48 year old man, had sudden onset at 7 PM of abdominal pain (11 May 2019 00:30)      ----  INTERVAL HPI/OVERNIGHT EVENTS: Pt seen and evaluated at the bedside. No acute overnight events occurred. no acute complaints    ----  PAST MEDICAL & SURGICAL HISTORY:  Diabetes mellitus with no complication  No significant past surgical history      FAMILY HISTORY:      ----  MEDICATIONS  (STANDING):  amiodarone    Tablet   Oral   amiodarone    Tablet 200 milliGRAM(s) Oral daily  chlorhexidine 2% Cloths 1 Application(s) Topical daily  dextrose 5%. 1000 milliLiter(s) (50 mL/Hr) IV Continuous <Continuous>  dextrose 50% Injectable 12.5 Gram(s) IV Push once  dextrose 50% Injectable 25 Gram(s) IV Push once  dextrose 50% Injectable 25 Gram(s) IV Push once  insulin glargine Injectable (LANTUS) 10 Unit(s) SubCutaneous at bedtime  insulin lispro (HumaLOG) corrective regimen sliding scale   SubCutaneous Before meals and at bedtime  nicotine -  14 mG/24Hr(s) Patch 1 patch Transdermal daily  pantoprazole    Tablet 40 milliGRAM(s) Oral two times a day  tamsulosin 0.8 milliGRAM(s) Oral at bedtime    MEDICATIONS  (PRN):  acetaminophen   Tablet .. 650 milliGRAM(s) Oral every 6 hours PRN Mild Pain (1 - 3)  aluminum hydroxide/magnesium hydroxide/simethicone Suspension 30 milliLiter(s) Oral every 4 hours PRN Dyspepsia  dextrose 40% Gel 15 Gram(s) Oral once PRN Blood Glucose LESS THAN 70 milliGRAM(s)/deciliter  glucagon  Injectable 1 milliGRAM(s) IntraMuscular once PRN Glucose LESS THAN 70 milligrams/deciliter  nitroglycerin     SubLingual 0.4 milliGRAM(s) SubLingual every 5 minutes PRN Chest Pain      ----  REVIEW OF SYSTEMS:  CONSTITUTIONAL: denies fever, chills, fatigue, weakness  HEENT: denies blurred vision, sore throat  SKIN: denies new lesions, rash  CARDIOVASCULAR: denies chest pain, chest pressure, palpitations  RESPIRATORY: denies shortness of breath, sputum production  GASTROINTESTINAL: denies nausea, vomiting, diarrhea, abdominal pain  GENITOURINARY: denies dysuria, discharge  NEUROLOGICAL: denies numbness, headache, focal weakness  MUSCULOSKELETAL: denies new joint pain, muscle aches      ----  PHYSICAL EXAM:  General: Well developed, well nourished, NAD  HEENT: NCAT, moist mucous membranes   Neurology: A&Ox3, nonfocal  Respiratory: CTA B/L, No wheezing, rales, or rhonchi  CV: RRR, S1/S2 present, no murmurs  Abdominal: Soft, nontender, non-distended, normoactive bowel sounds  Extremities: 1+ pitting edema b/l, peripheral pulses present, Left foot dressing clean, dry, intact  MSK: Normal ROM, no joint erythema or warmth, no joint swelling   Skin: warm, dry, normal color    T(C): 36.9 (05-28-19 @ 08:00), Max: 37.8 (05-27-19 @ 11:46)  HR: 63 (05-28-19 @ 08:00) (63 - 73)  BP: 113/68 (05-28-19 @ 08:00) (108/63 - 129/72)  RR: 18 (05-28-19 @ 08:00) (16 - 18)  SpO2: 64% (05-28-19 @ 08:00) (64% - 100%)  Wt(kg): --    ----  I&O's Summary    27 May 2019 07:01  -  28 May 2019 07:00  --------------------------------------------------------  IN: 600 mL / OUT: 4950 mL / NET: -4350 mL        LABS:                        8.0    7.04  )-----------( 437      ( 28 May 2019 05:44 )             24.5     05-28    142  |  108  |  22  ----------------------------<  136<H>  3.9   |  29  |  1.20    Ca    8.3<L>      28 May 2019 05:44          CAPILLARY BLOOD GLUCOSE      POCT Blood Glucose.: 169 mg/dL (28 May 2019 07:21)  POCT Blood Glucose.: 233 mg/dL (27 May 2019 21:24)  POCT Blood Glucose.: 197 mg/dL (27 May 2019 17:09)  POCT Blood Glucose.: 230 mg/dL (27 May 2019 11:32)                ----  Personally reviewed:  Vital sign trends: [ x ] yes    [  ] no     [  ] n/a  Laboratory results: [ x ] yes    [  ] no     [  ] n/a  Radiology results: [ x ] yes    [  ] no     [  ] n/a  Culture results: [x  ] yes    [  ] no     [  ] n/a  Consultant recommendations: [ x ] yes    [  ] no     [  ] n/a

## 2019-05-28 NOTE — PROGRESS NOTE ADULT - SUBJECTIVE AND OBJECTIVE BOX
POST OPERATIVE DAY #: 17  STATUS POST:  omentopexy, plication of gastric ulcer    SUBJECTIVE:  49 y/o M seen and examined at bedside with no overnight events. Patient with no new complaints at this time. He states he is feeling well. He is tolerating his regular diet. Admits to passing flatus and having bowel movements.  Patient denies any fevers, chills, chest pain, shortness of breath, abdominal pain, nausea, vomiting or diarrhea.    Vital Signs Last 24 Hrs  T(C): 36.9 (28 May 2019 08:00), Max: 37.8 (27 May 2019 11:46)  T(F): 98.5 (28 May 2019 08:00), Max: 100 (27 May 2019 11:46)  HR: 63 (28 May 2019 08:00) (63 - 73)  BP: 113/68 (28 May 2019 08:00) (108/63 - 129/72)  BP(mean): --  RR: 18 (28 May 2019 08:00) (16 - 18)  SpO2: 64% (28 May 2019 08:00) (64% - 100%)    PHYSICAL EXAM:  GENERAL: NAD, well-developed  HEAD:  Atraumatic, Normocephalic  ABDOMEN: Incision healing well. Soft, non-tender to palpation, non-distended. Normal bowel sounds x 4 quadrants.   NEUROLOGY: A&O x 3.     I&O's Summary    27 May 2019 07:01  -  28 May 2019 07:00  --------------------------------------------------------  IN: 600 mL / OUT: 4950 mL / NET: -4350 mL      I&O's Detail    27 May 2019 07:01  -  28 May 2019 07:00  --------------------------------------------------------  IN:    Oral Fluid: 600 mL  Total IN: 600 mL    OUT:    Indwelling Catheter - Urethral: 4950 mL  Total OUT: 4950 mL    Total NET: -4350 mL    MEDICATIONS  (STANDING):  amiodarone    Tablet   Oral   amiodarone    Tablet 200 milliGRAM(s) Oral daily  chlorhexidine 2% Cloths 1 Application(s) Topical daily  dextrose 5%. 1000 milliLiter(s) (50 mL/Hr) IV Continuous <Continuous>  dextrose 50% Injectable 12.5 Gram(s) IV Push once  dextrose 50% Injectable 25 Gram(s) IV Push once  dextrose 50% Injectable 25 Gram(s) IV Push once  insulin glargine Injectable (LANTUS) 10 Unit(s) SubCutaneous at bedtime  insulin lispro (HumaLOG) corrective regimen sliding scale   SubCutaneous Before meals and at bedtime  nicotine -  14 mG/24Hr(s) Patch 1 patch Transdermal daily  pantoprazole    Tablet 40 milliGRAM(s) Oral two times a day  tamsulosin 0.8 milliGRAM(s) Oral at bedtime    MEDICATIONS  (PRN):  acetaminophen   Tablet .. 650 milliGRAM(s) Oral every 6 hours PRN Mild Pain (1 - 3)  aluminum hydroxide/magnesium hydroxide/simethicone Suspension 30 milliLiter(s) Oral every 4 hours PRN Dyspepsia  dextrose 40% Gel 15 Gram(s) Oral once PRN Blood Glucose LESS THAN 70 milliGRAM(s)/deciliter  glucagon  Injectable 1 milliGRAM(s) IntraMuscular once PRN Glucose LESS THAN 70 milligrams/deciliter  nitroglycerin     SubLingual 0.4 milliGRAM(s) SubLingual every 5 minutes PRN Chest Pain    LABS:                        8.0    7.04  )-----------( 437      ( 28 May 2019 05:44 )             24.5     05-28    142  |  108  |  22  ----------------------------<  136<H>  3.9   |  29  |  1.20    Ca    8.3<L>      28 May 2019 05:44    ASSESSMENT  49 y/o M POD 17, s/p omentopexy and plication of gastric ulcer, feeling well, tolerating regular diet, +F, +BM, now with OM of L foot.     PLAN  - incentive spirometer  - pain control, supportive care  - Regular diet, currently NPO for procedure  - OR today for left foot hallux resection and debridement with Podiatry  - No further general surgery intervention indicated at this time. Patient being transferred to medical service. Please reconsult if necessary.   - Case discussed with Dr. Mejia    Surgical Team Contact Information  Spectralink: Ext: [phone] or [phone]  Pager: [phone]

## 2019-05-28 NOTE — PROGRESS NOTE ADULT - PROBLEM SELECTOR PLAN 3
-on PO amio  -given age, hopefully can relegate to relatively short course, plan is to stop Amio as an outpatient  -Off full dose AC at this time as risks>benefits in setting of anemia, prophylactic Lovenox on hold for planned surgery today. -on PO amio  -given age, hopefully can relegate to relatively short course, plan is to stop Amio as an outpatient  -Off full dose AC at this time as risks>benefits in setting of anemia, prophylactic Lovenox on hold for planned surgery today.   no need for long term AC, per cardio, pt likely with PAF  plan to switch to BB once off amio  Cardio: Panella, appreciate rec

## 2019-05-28 NOTE — PROGRESS NOTE ADULT - PROBLEM SELECTOR PLAN 5
remains resolved at present  ekg/tele nsr, without st or t changes  sublingual nitro prn now   follow up cardio recs remains resolved at present  ekg/tele nsr, without st or t changes. CE consistent with possibel NSTEMI  sublingual nitro prn now   follow up cardio recs remains resolved at present  ekg/tele nsr, without st or t changes. CE consistent with possible NSTEMI  sublingual nitro prn now   follow up cardio recs

## 2019-05-28 NOTE — PROGRESS NOTE ADULT - SUBJECTIVE AND OBJECTIVE BOX
[facility] -- Bolivar Carbajal, El, Brittany, Zahra, Román Jacobs  Office # [phone]    Follow Up:  Afib., Preop Eval    Subjective/Observations: Awake and alert, laying comfortably, on RA.  No complaints    REVIEW OF SYSTEMS: All other review of systems is negative unless indicated above    PAST MEDICAL & SURGICAL HISTORY:  Diabetes mellitus with no complication  No significant past surgical history    MEDICATIONS  (STANDING):  amiodarone    Tablet   Oral   amiodarone    Tablet 200 milliGRAM(s) Oral daily  chlorhexidine 2% Cloths 1 Application(s) Topical daily  dextrose 5%. 1000 milliLiter(s) (50 mL/Hr) IV Continuous <Continuous>  dextrose 50% Injectable 12.5 Gram(s) IV Push once  dextrose 50% Injectable 25 Gram(s) IV Push once  dextrose 50% Injectable 25 Gram(s) IV Push once  insulin glargine Injectable (LANTUS) 10 Unit(s) SubCutaneous at bedtime  insulin lispro (HumaLOG) corrective regimen sliding scale   SubCutaneous Before meals and at bedtime  nicotine -  14 mG/24Hr(s) Patch 1 patch Transdermal daily  pantoprazole    Tablet 40 milliGRAM(s) Oral two times a day  tamsulosin 0.8 milliGRAM(s) Oral at bedtime    MEDICATIONS  (PRN):  acetaminophen   Tablet .. 650 milliGRAM(s) Oral every 6 hours PRN Mild Pain (1 - 3)  aluminum hydroxide/magnesium hydroxide/simethicone Suspension 30 milliLiter(s) Oral every 4 hours PRN Dyspepsia  dextrose 40% Gel 15 Gram(s) Oral once PRN Blood Glucose LESS THAN 70 milliGRAM(s)/deciliter  glucagon  Injectable 1 milliGRAM(s) IntraMuscular once PRN Glucose LESS THAN 70 milligrams/deciliter  nitroglycerin     SubLingual 0.4 milliGRAM(s) SubLingual every 5 minutes PRN Chest Pain    Allergies    No Known Allergies    Intolerances    Vital Signs Last 24 Hrs  T(C): 36.6 (28 May 2019 04:32), Max: 37.8 (27 May 2019 11:46)  T(F): 97.9 (28 May 2019 04:32), Max: 100 (27 May 2019 11:46)  HR: 73 (28 May 2019 04:32) (64 - 73)  BP: 129/72 (28 May 2019 04:32) (108/63 - 129/72)  BP(mean): --  RR: 17 (28 May 2019 04:32) (16 - 17)  SpO2: 97% (28 May 2019 04:32) (95% - 100%)    I&O's Summary    27 May 2019 07:01  -  28 May 2019 07:00  --------------------------------------------------------  IN: 600 mL / OUT: 4950 mL / NET: -4350 mL    PHYSICAL EXAM:  TELE: NSR  Constitutional: NAD, awake and alert, obese  HEENT: Moist Mucous Membranes, Anicteric  Pulmonary: Non-labored, breath sounds are clear bilaterally, No wheezing, rales or rhonchi  Cardiovascular: Regular, S1 and S2, No murmurs, rubs, gallops or clicks  Gastrointestinal: Bowel Sounds present, soft, nontender.   Lymph: No peripheral edema. No lymphadenopathy.  Skin: No visible rashes right foot ulcer.  Psych:  Mood & affect appropriate    LABS: All Labs Reviewed:                        8.0    7.04  )-----------( 437      ( 28 May 2019 05:44 )             24.5                         8.1    7.80  )-----------( 431      ( 27 May 2019 06:54 )             25.6                         7.5    7.83  )-----------( 408      ( 26 May 2019 12:03 )             22.7     28 May 2019 05:44    142    |  108    |  22     ----------------------------<  136    3.9     |  29     |  1.20   27 May 2019 06:54    142    |  106    |  22     ----------------------------<  108    4.1     |  30     |  1.30   26 May 2019 08:22    141    |  107    |  25     ----------------------------<  153    4.1     |  28     |  1.40     Ca    8.3        28 May 2019 05:44  Ca    8.3        27 May 2019 06:54  Ca    8.2        26 May 2019 08:22  Phos  3.7       26 May 2019 08:22  Mg     2.0       26 May 2019 08:22    TPro  6.1    /  Alb  2.4    /  TBili  0.2    /  DBili  x      /  AST  18     /  ALT  24     /  AlkPhos  100    26 May 2019 08:22    PT/INR - ( 26 May 2019 08:22 )   PT: 13.0 sec;   INR: 1.15 ratio    PTT - ( 26 May 2019 08:22 )  PTT:34.6 sec    < from: TTE Echo Doppler w/o Cont (05.16.19 @ 09:45) >     EXAM:  ECHO TTE WO CON COMP W DOPPLR         PROCEDURE DATE:  05/16/2019        INTERPRETATION:  INDICATION: Elevated troponin    Blood Pressure 137/62    Height 187.96 cm     Weight 81.6 kg       BSA   2.1 sq m    Dimensions:    LA 4.1       Normal Values: 2.0 - 4.0 cm    Ao 3.6        Normal Values: 2.0 - 3.8 cm  SEPTUM 1.3       Normal Values: 0.6 - 1.2 cm  PWT 1.3       Normal Values: 0.6 - 1.1 cm  LVIDd 6.0         Normal Values: 3.0 - 5.6 cm  LVIDs 3.1         Normal Values: 1.8 - 4.0 cm      OBSERVATIONS:    Mitral Valve: Thickened leaflets, moderate MR.  Aortic Valve/Aorta: Sclerotic trileaflet aortic valve with normal   opening. Trace AI  Tricuspid Valve: normal with mild TR.  Pulmonic Valve: normal  Left Atrium: Mildly dilated  Right Atrium: normal  Left Ventricle: normal LV size and systolic function, estimated LVEF of   65%. No evidence of segmental dysfunction. Basal septal hypertrophy is   present  Right Ventricle: normal size and systolic function.  Pericardium/Pleura: normal, no significant pericardial effusion.    Conclusion:     normal LV size and systolic function, estimated LVEF of 65%. No evidence   of segmental dysfunction  Normal RV size and systolic function.   Mild left atrial enlargement  Sclerotic trileaflet aortic valve with normal opening. Trace AI  Mitral valve with thickened leaflets and moderate MR  Mild TR  No significant pericardial effusion.        < end of copied text >    < from: 12 Lead ECG (05.22.19 @ 11:35) >  Ventricular Rate 65 BPM    Atrial Rate 65 BPM    P-R Interval 132 ms    QRS Duration 86 ms    Q-T Interval 428 ms    QTC Calculation(Bezet) 445 ms    P Axis 37 degrees    R Axis 23 degrees    T Axis 57 degrees    < end of copied text >      < from: US Duplex Venous Upper Ext Ltd, Right (05.15.19 @ 11:26) >  EXAM:  US DPLX UPR EXT VEINS LTD RT                          PROCEDURE DATE:  05/15/2019      INTERPRETATION:  CLINICAL INFORMATION: Right upper extremity swelling.    COMPARISON: None available.    TECHNIQUE: Duplex sonography of the RIGHT UPPER extremity with color and   spectral Doppler, with and without compression.      FINDINGS:    The right internal jugular, subclavian, axillary, brachial, basilic and   cephalic veins are patent and compressible where applicable.     Doppler examination shows normal spontaneous and phasic flow.    IMPRESSION:     No evidence of right upper extremity deep venous thrombosis.    < end of copied text >    MR foot   IMPRESSION:   First distal metatarsal osteomyelitis, with enhancement in the   surrounding soft tissues. Soft tissue wound/ulceration, no focal soft   tissue abscess.  Chronic appearing irregularity at the distal fifth metatarsal, without   acute bone marrow edema enhancement.  Chronic-appearing deformity of the distal second metatarsal, with   dislocation at the second MTP joint.    < from: Xray Chest 1 View- PORTABLE-Routine (05.12.19 @ 05:45) >    EXAM:  XR CHEST PORTABLE ROUTINE 1V                          PROCEDURE DATE:  05/12/2019      INTERPRETATION:  Clinical information: Productive cough    Portable study, 5:39 AM    Comparison study dated 5/10/2019, 10:29 PM.    Cardiac monitorleads present. A nasogastric tube is present, the distal   end of the tube enters the stomach in the left upper quadrant.    Hazy appearance the lung bases likely related to a combination of   vascular shadows and overlying soft tissues. However recommend a   follow-up study, PA and lateral views of the chest to better define the   lung bases.    Heart size upper limits of normal magnified secondary to portable   technique. No acute osseous abnormalities.    IMPRESSION: See above report    LETY LONDONO M.D.,ATTENDING RADIOLOGIST  This document has been electronically signed. May 12 2019 12:53PM      < end of copied text >

## 2019-05-28 NOTE — CHART NOTE - NSCHARTNOTEFT_GEN_A_CORE
Assessment: 49 y/o male adm with gastric ulcer perforation. Pt currently NPO for OR. Left foot hallux resection and debridement with podiatry. Pt states that he has been tolerating diet well, no complaints, good appetite.     Factors impacting intake: [ ] none [ ] nausea  [ ] vomiting [ ] diarrhea [ ] constipation  [ ]chewing problems [ ] swallowing issues  [ x] other: currently NPO for OR    Diet Presciption: Diet, Regular:   Consistent Carbohydrate {Evening Snack}  DASH/TLC {Sodium & Cholesterol Restricted} (05-18-19 @ 07:33)  Diet, NPO after Midnight:      NPO Start Date: 27-May-2019,   NPO Start Time: 23:59  Except Medications (05-27-19 @ 12:52)    Intake: 100% (prior to NPO status)    Current Weight: 5/28 203# 5/13 216.7# adm 179.1# (wts noted to fluctuate; 1+ generalized edema; 2+ left/right foot edema)  % Weight Change    Pertinent Medications: MEDICATIONS  (STANDING):  amiodarone    Tablet   Oral   amiodarone    Tablet 200 milliGRAM(s) Oral daily  chlorhexidine 2% Cloths 1 Application(s) Topical daily  dextrose 5%. 1000 milliLiter(s) (50 mL/Hr) IV Continuous <Continuous>  dextrose 50% Injectable 12.5 Gram(s) IV Push once  dextrose 50% Injectable 25 Gram(s) IV Push once  dextrose 50% Injectable 25 Gram(s) IV Push once  insulin glargine Injectable (LANTUS) 10 Unit(s) SubCutaneous at bedtime  insulin lispro (HumaLOG) corrective regimen sliding scale   SubCutaneous Before meals and at bedtime  nicotine -  14 mG/24Hr(s) Patch 1 patch Transdermal daily  pantoprazole    Tablet 40 milliGRAM(s) Oral two times a day  sodium chloride 0.9% 1000 milliLiter(s) (75 mL/Hr) IV Continuous <Continuous>  tamsulosin 0.8 milliGRAM(s) Oral at bedtime    MEDICATIONS  (PRN):  acetaminophen   Tablet .. 650 milliGRAM(s) Oral every 6 hours PRN Mild Pain (1 - 3)  aluminum hydroxide/magnesium hydroxide/simethicone Suspension 30 milliLiter(s) Oral every 4 hours PRN Dyspepsia  dextrose 40% Gel 15 Gram(s) Oral once PRN Blood Glucose LESS THAN 70 milliGRAM(s)/deciliter  glucagon  Injectable 1 milliGRAM(s) IntraMuscular once PRN Glucose LESS THAN 70 milligrams/deciliter  nitroglycerin     SubLingual 0.4 milliGRAM(s) SubLingual every 5 minutes PRN Chest Pain    Pertinent Labs: 05-28 Na142 mmol/L Glu 136 mg/dL<H> K+ 3.9 mmol/L Cr  1.20 mg/dL BUN 22 mg/dL 05-26 Phos 3.7 mg/dL 05-26 Alb 2.4 g/dL<L> 05-12 [id number] 8.0 %<H> 05-17 Chol 105 mg/dL LDL 55 mg/dL HDL 22 mg/dL<L> Trig 140 mg/dL     CAPILLARY BLOOD GLUCOSE      POCT Blood Glucose.: 169 mg/dL (28 May 2019 07:21)  POCT Blood Glucose.: 233 mg/dL (27 May 2019 21:24)  POCT Blood Glucose.: 197 mg/dL (27 May 2019 17:09)    Skin: no skin breakdown noted.     Estimated Needs:   [x ] no change since previous assessment  [ ] recalculated:     Previous Nutrition Diagnosis:   [ ] Inadequate Energy Intake [ ]Inadequate Oral Intake [ ] Excessive Energy Intake   [ ] Underweight [ ] Increased Nutrient Needs [ ] Overweight/Obesity   [x ] Altered GI Function [ ] Unintended Weight Loss [ ] Food & Nutrition Related Knowledge Deficit [ ] Malnutrition     Nutrition Diagnosis is [ ] ongoing  [ x] resolved [ ] not applicable     New Nutrition Diagnosis: [x ] not applicable       Interventions:   Recommend  [ ] Change Diet To:  [ ] Nutrition Supplement  [ ] Nutrition Support  [ x] Other: resume diet after surgery    Monitoring and Evaluation:   [x ] PO intake [ x ] Tolerance to diet prescription [ x ] weights [ x ] labs[ x ] follow up per protocol  [ ] other:

## 2019-05-28 NOTE — PROGRESS NOTE ADULT - PROBLEM SELECTOR PLAN 2
first distal metatarsal osteomyelitis noted on MRI  plan for OR with podiatry on 5/28  no absolute medical CI for proposed procedure

## 2019-05-28 NOTE — PROGRESS NOTE ADULT - ASSESSMENT
48 year old male with HTN and DM2, who presented with Perforated Antral Gastric Ulcer, POD 5 s/p Emergent Exp-lap Oomentopexy and plication. He had an RRT for atrial fibrillation with rapid ventricular response, which is new. He has had difficult to control periods of tachycardia and bradycardia, currently in a sinus rhythm this morning   He had an elevated troponin possibly suggestive of non-ST segment elevation myocardial infarction/demand ischemia. He has no known history of coronary artery disease    Afib  - Remains in SR.  He converted to NSR from AFib, no further events on tele   - Continue Amio, plan is to stop Amio as an outpatient  - We can consider BB once off Amiodarone  - TTE with normal LV function, moderate MR   - Full dose Lovenox because of drop in H/H and Afib was short lived ( no need for long term AC)  - H/H remain stable.  - Maintain  K>4, Mg>2  - Cont to monitor on tele for occult Afib  - If no recurrence of Afib while inpatient, we recommend an event monitor as outpatient.    S/P omentopexy and plication of gastric ulcer   - Tolerated procedure well, however, had Afib postop, spontaneously to NSR  - Pain control    - MRI now with left foot osteomyelitis and planned for left hallux resection with debridement today  (as per podiatry)   - No cardiac contraindication to proceeding at this time.  Okay to proceed with surgery with routine cardiovascular monitoring   - All other workup per Primary. Will continue to follow     Henrietta Kim [facility]  Cardiology 48 year old male with HTN and DM2, who presented with Perforated Antral Gastric Ulcer, POD 5 s/p Emergent Exp-lap Oomentopexy and plication. He had an RRT for atrial fibrillation with rapid ventricular response, which is new. He has had difficult to control periods of tachycardia and bradycardia, currently in a sinus rhythm this morning   He had an elevated troponin possibly suggestive of non-ST segment elevation myocardial infarction/demand ischemia. He has no known history of coronary artery disease    Afib  - Remains in SR.  He converted to NSR from AFib, no further events on tele   - Continue Amio, plan is to stop Amio as an outpatient  - We can consider BB once off Amiodarone  - TTE with normal LV function, moderate MR   - Holding full dose Lovenox because of drop in H/H and Afib was short lived ( no need for long term AC)  - H/H remain stable.  - Maintain  K>4, Mg>2  - Cont to monitor on tele for occult Afib  - If no recurrence of Afib while inpatient, we recommend an event monitor as outpatient.    S/P omentopexy and plication of gastric ulcer   - Tolerated procedure well, however, had Afib postop, spontaneously to NSR  - Pain control    - MRI now with left foot osteomyelitis and planned for left hallux resection with debridement today  (as per podiatry)   - No cardiac contraindication to proceeding at this time.  Okay to proceed with surgery with routine cardiovascular monitoring   - All other workup per Primary. Will continue to follow     Henrietta Kim [facility]  Cardiology

## 2019-05-28 NOTE — PROGRESS NOTE ADULT - SUBJECTIVE AND OBJECTIVE BOX
CAPILLARY BLOOD GLUCOSE      POCT Blood Glucose.: 169 mg/dL (28 May 2019 07:21)  POCT Blood Glucose.: 233 mg/dL (27 May 2019 21:24)  POCT Blood Glucose.: 197 mg/dL (27 May 2019 17:09)  POCT Blood Glucose.: 230 mg/dL (27 May 2019 11:32)      Vital Signs Last 24 Hrs  T(C): 36.6 (28 May 2019 04:32), Max: 37.8 (27 May 2019 11:46)  T(F): 97.9 (28 May 2019 04:32), Max: 100 (27 May 2019 11:46)  HR: 73 (28 May 2019 04:32) (64 - 73)  BP: 129/72 (28 May 2019 04:32) (108/63 - 129/72)  BP(mean): --  RR: 17 (28 May 2019 04:32) (16 - 17)  SpO2: 97% (28 May 2019 04:32) (95% - 100%)    General: WN/WD NAD  Respiratory: CTA B/L  CV: RRR, S1S2, no murmurs, rubs or gallops  Abdominal: Soft, NT, ND +BS, Last BM  Extremities: No edema, + peripheral pulses     05-28    142  |  108  |  22  ----------------------------<  136<H>  3.9   |  29  |  1.20    Ca    8.3<L>      28 May 2019 05:44  Phos  3.7     05-26  Mg     2.0     05-26    TPro  6.1  /  Alb  2.4<L>  /  TBili  0.2  /  DBili  x   /  AST  18  /  ALT  24  /  AlkPhos  100  05-26      dextrose 40% Gel 15 Gram(s) Oral once PRN  dextrose 50% Injectable 12.5 Gram(s) IV Push once  dextrose 50% Injectable 25 Gram(s) IV Push once  dextrose 50% Injectable 25 Gram(s) IV Push once  glucagon  Injectable 1 milliGRAM(s) IntraMuscular once PRN  insulin glargine Injectable (LANTUS) 10 Unit(s) SubCutaneous at bedtime  insulin lispro (HumaLOG) corrective regimen sliding scale   SubCutaneous Before meals and at bedtime

## 2019-05-29 LAB
ANION GAP SERPL CALC-SCNC: 5 MMOL/L — SIGNIFICANT CHANGE UP (ref 5–17)
BUN SERPL-MCNC: 23 MG/DL — SIGNIFICANT CHANGE UP (ref 7–23)
CALCIUM SERPL-MCNC: 8.1 MG/DL — LOW (ref 8.5–10.1)
CHLORIDE SERPL-SCNC: 107 MMOL/L — SIGNIFICANT CHANGE UP (ref 96–108)
CO2 SERPL-SCNC: 29 MMOL/L — SIGNIFICANT CHANGE UP (ref 22–31)
CREAT SERPL-MCNC: 1.4 MG/DL — HIGH (ref 0.5–1.3)
GLUCOSE SERPL-MCNC: 226 MG/DL — HIGH (ref 70–99)
GRAM STN FLD: SIGNIFICANT CHANGE UP
GRAM STN FLD: SIGNIFICANT CHANGE UP
HCT VFR BLD CALC: 24.3 % — LOW (ref 39–50)
HGB BLD-MCNC: 7.8 G/DL — LOW (ref 13–17)
MCHC RBC-ENTMCNC: 31.6 PG — SIGNIFICANT CHANGE UP (ref 27–34)
MCHC RBC-ENTMCNC: 32.1 GM/DL — SIGNIFICANT CHANGE UP (ref 32–36)
MCV RBC AUTO: 98.4 FL — SIGNIFICANT CHANGE UP (ref 80–100)
NRBC # BLD: 0 /100 WBCS — SIGNIFICANT CHANGE UP (ref 0–0)
PLATELET # BLD AUTO: 405 K/UL — HIGH (ref 150–400)
POTASSIUM SERPL-MCNC: 4.2 MMOL/L — SIGNIFICANT CHANGE UP (ref 3.5–5.3)
POTASSIUM SERPL-SCNC: 4.2 MMOL/L — SIGNIFICANT CHANGE UP (ref 3.5–5.3)
RBC # BLD: 2.47 M/UL — LOW (ref 4.2–5.8)
RBC # FLD: 14.2 % — SIGNIFICANT CHANGE UP (ref 10.3–14.5)
SODIUM SERPL-SCNC: 141 MMOL/L — SIGNIFICANT CHANGE UP (ref 135–145)
SPECIMEN SOURCE: SIGNIFICANT CHANGE UP
SPECIMEN SOURCE: SIGNIFICANT CHANGE UP
WBC # BLD: 10.18 K/UL — SIGNIFICANT CHANGE UP (ref 3.8–10.5)
WBC # FLD AUTO: 10.18 K/UL — SIGNIFICANT CHANGE UP (ref 3.8–10.5)

## 2019-05-29 PROCEDURE — 99232 SBSQ HOSP IP/OBS MODERATE 35: CPT

## 2019-05-29 RX ORDER — ONDANSETRON 8 MG/1
4 TABLET, FILM COATED ORAL ONCE
Refills: 0 | Status: COMPLETED | OUTPATIENT
Start: 2019-05-29 | End: 2019-05-29

## 2019-05-29 RX ORDER — OXYCODONE AND ACETAMINOPHEN 5; 325 MG/1; MG/1
1 TABLET ORAL EVERY 4 HOURS
Refills: 0 | Status: DISCONTINUED | OUTPATIENT
Start: 2019-05-29 | End: 2019-06-03

## 2019-05-29 RX ORDER — OXYCODONE AND ACETAMINOPHEN 5; 325 MG/1; MG/1
2 TABLET ORAL EVERY 6 HOURS
Refills: 0 | Status: DISCONTINUED | OUTPATIENT
Start: 2019-05-29 | End: 2019-06-05

## 2019-05-29 RX ORDER — DOCUSATE SODIUM 100 MG
100 CAPSULE ORAL THREE TIMES A DAY
Refills: 0 | Status: DISCONTINUED | OUTPATIENT
Start: 2019-05-29 | End: 2019-06-05

## 2019-05-29 RX ORDER — SENNA PLUS 8.6 MG/1
2 TABLET ORAL AT BEDTIME
Refills: 0 | Status: DISCONTINUED | OUTPATIENT
Start: 2019-05-29 | End: 2019-06-05

## 2019-05-29 RX ORDER — ENOXAPARIN SODIUM 100 MG/ML
40 INJECTION SUBCUTANEOUS AT BEDTIME
Refills: 0 | Status: DISCONTINUED | OUTPATIENT
Start: 2019-05-29 | End: 2019-06-05

## 2019-05-29 RX ORDER — POLYETHYLENE GLYCOL 3350 17 G/17G
17 POWDER, FOR SOLUTION ORAL AT BEDTIME
Refills: 0 | Status: DISCONTINUED | OUTPATIENT
Start: 2019-05-29 | End: 2019-06-05

## 2019-05-29 RX ORDER — SODIUM CHLORIDE 9 MG/ML
1000 INJECTION, SOLUTION INTRAVENOUS
Refills: 0 | Status: DISCONTINUED | OUTPATIENT
Start: 2019-05-29 | End: 2019-06-02

## 2019-05-29 RX ADMIN — CHLORHEXIDINE GLUCONATE 1 APPLICATION(S): 213 SOLUTION TOPICAL at 12:22

## 2019-05-29 RX ADMIN — OXYCODONE AND ACETAMINOPHEN 1 TABLET(S): 5; 325 TABLET ORAL at 10:00

## 2019-05-29 RX ADMIN — SODIUM CHLORIDE 75 MILLILITER(S): 9 INJECTION, SOLUTION INTRAVENOUS at 08:25

## 2019-05-29 RX ADMIN — Medication 1 PATCH: at 12:23

## 2019-05-29 RX ADMIN — Medication 100 MILLIGRAM(S): at 22:30

## 2019-05-29 RX ADMIN — OXYCODONE AND ACETAMINOPHEN 2 TABLET(S): 5; 325 TABLET ORAL at 23:16

## 2019-05-29 RX ADMIN — ENOXAPARIN SODIUM 40 MILLIGRAM(S): 100 INJECTION SUBCUTANEOUS at 22:37

## 2019-05-29 RX ADMIN — Medication 100 MILLIGRAM(S): at 13:34

## 2019-05-29 RX ADMIN — SENNA PLUS 2 TABLET(S): 8.6 TABLET ORAL at 22:31

## 2019-05-29 RX ADMIN — INSULIN GLARGINE 10 UNIT(S): 100 INJECTION, SOLUTION SUBCUTANEOUS at 22:30

## 2019-05-29 RX ADMIN — Medication 6: at 22:29

## 2019-05-29 RX ADMIN — POLYETHYLENE GLYCOL 3350 17 GRAM(S): 17 POWDER, FOR SOLUTION ORAL at 22:31

## 2019-05-29 RX ADMIN — TAMSULOSIN HYDROCHLORIDE 0.8 MILLIGRAM(S): 0.4 CAPSULE ORAL at 22:31

## 2019-05-29 RX ADMIN — ONDANSETRON 4 MILLIGRAM(S): 8 TABLET, FILM COATED ORAL at 09:06

## 2019-05-29 RX ADMIN — OXYCODONE AND ACETAMINOPHEN 2 TABLET(S): 5; 325 TABLET ORAL at 22:37

## 2019-05-29 RX ADMIN — PANTOPRAZOLE SODIUM 40 MILLIGRAM(S): 20 TABLET, DELAYED RELEASE ORAL at 06:07

## 2019-05-29 RX ADMIN — Medication 100 MILLIGRAM(S): at 22:31

## 2019-05-29 RX ADMIN — AMIODARONE HYDROCHLORIDE 200 MILLIGRAM(S): 400 TABLET ORAL at 06:07

## 2019-05-29 RX ADMIN — Medication 2: at 17:21

## 2019-05-29 RX ADMIN — Medication 4: at 12:23

## 2019-05-29 RX ADMIN — ONDANSETRON 4 MILLIGRAM(S): 8 TABLET, FILM COATED ORAL at 23:17

## 2019-05-29 RX ADMIN — OXYCODONE AND ACETAMINOPHEN 1 TABLET(S): 5; 325 TABLET ORAL at 09:06

## 2019-05-29 RX ADMIN — Medication 100 MILLIGRAM(S): at 06:07

## 2019-05-29 RX ADMIN — PANTOPRAZOLE SODIUM 40 MILLIGRAM(S): 20 TABLET, DELAYED RELEASE ORAL at 17:20

## 2019-05-29 RX ADMIN — Medication 1 PATCH: at 20:39

## 2019-05-29 RX ADMIN — HYDROMORPHONE HYDROCHLORIDE 0.25 MILLIGRAM(S): 2 INJECTION INTRAMUSCULAR; INTRAVENOUS; SUBCUTANEOUS at 06:16

## 2019-05-29 RX ADMIN — Medication 2: at 08:26

## 2019-05-29 NOTE — PROGRESS NOTE ADULT - ASSESSMENT
48 year old male with HTN and DM2, who presented with Perforated Antral Gastric Ulcer, POD 5 s/p Emergent Exp-lap Oomentopexy and plication. He had an RRT for atrial fibrillation with rapid ventricular response, which is new. He has had difficult to control periods of tachycardia and bradycardia, currently in a sinus rhythm this morning   He had an elevated troponin possibly suggestive of non-ST segment elevation myocardial infarction/demand ischemia. He has no known history of coronary artery disease    Afib  - Remains in SR.  Currently off monitor  - Continue Amio, plan is to stop Amio as an outpatient  - We can consider BB once off Amiodarone  - TTE with normal LV function, moderate MR   - Holding full dose Lovenox because of drop in H/H and Afib was short lived ( no need for long term AC)  - H/H remain stable.  - Maintain  K>4, Mg>2  - If no recurrence of Afib while inpatient, we recommend an event monitor as outpatient.    S/P omentopexy and plication of gastric ulcer   - Tolerated procedure well, however, had Afib postop, spontaneously to NSR  - Pain control    - MRI with left foot osteomyelitis and now S/P  left hallux resection with debridement tolerated procedure w/o cardiac compromise   - All other workup per Primary. Will continue to follow     Ed Paula [facility]  Cardiology

## 2019-05-29 NOTE — PROGRESS NOTE ADULT - PROBLEM SELECTOR PLAN 5
remains resolved at present  ekg/tele nsr, without st or t changes. CE consistent with possible NSTEMI  sublingual nitro prn now   follow up cardio recs

## 2019-05-29 NOTE — PROGRESS NOTE ADULT - PROBLEM SELECTOR PLAN 1
cont lantus 10 units daily  cont humalog scale coverage qac/qhs  cont cons cho diet  goal bg 100-180 in hosp setting

## 2019-05-29 NOTE — PROGRESS NOTE ADULT - PROBLEM SELECTOR PLAN 3
-on PO amio  -given age, hopefully can relegate to relatively short course, plan is to stop Amio as an outpatient  -Off full dose AC at this time as risks>benefits in setting of anemia  -no need for long term AC, per cardio, pt likely with PAF  -plan to switch to BB once off amio  Cardio: Panella, appreciate rec

## 2019-05-29 NOTE — PROGRESS NOTE ADULT - ATTENDING COMMENTS
Patient seen and evaluated   wound cleaned with NS and dressed with DSD   f/u OR bone Cx   f/u OR path

## 2019-05-29 NOTE — PROGRESS NOTE ADULT - SUBJECTIVE AND OBJECTIVE BOX
Patient is a 48y old  Male who presents with a chief complaint of perforated stomach ulcer (13 May 2019 13:25) seen by podiatry for left foot medial 1st IPJ joint G2 DFU     HPI:  48 year old man, seen at bedside during podiatry rounds for left foot medial hallux G2 DFU. Patient resting in bed conformably in NAD. Patient denies N/F/V/SOB/CP or calf pain at this time. Patient is s/p left foot hallux and 1st met amputation Patient denies any further pedal complaints at this time.    ICU Vital Signs Last 24 Hrs  T(C): 37.2 (29 May 2019 11:34), Max: 37.7 (28 May 2019 18:42)  T(F): 99 (29 May 2019 11:34), Max: 99.8 (28 May 2019 18:42)  HR: 65 (29 May 2019 11:34) (53 - 72)  BP: 111/71 (29 May 2019 11:34) (111/66 - 145/69)  BP(mean): --  ABP: --  ABP(mean): --  RR: 21 (29 May 2019 11:34) (12 - 21)  SpO2: 94% (29 May 2019 11:34) (94% - 99%)                          7.8    10.18 )-----------( 405      ( 29 May 2019 06:09 )             24.3   05-29    141  |  107  |  23  ----------------------------<  226<H>  4.2   |  29  |  1.40<H>    Ca    8.1<L>      29 May 2019 06:09    MEDICATIONS  (STANDING):  amiodarone    Tablet 200 milliGRAM(s) Oral daily  ceFAZolin   IVPB 2000 milliGRAM(s) IV Intermittent every 8 hours  chlorhexidine 2% Cloths 1 Application(s) Topical daily  dextrose 5%. 1000 milliLiter(s) (50 mL/Hr) IV Continuous <Continuous>  dextrose 50% Injectable 12.5 Gram(s) IV Push once  dextrose 50% Injectable 25 Gram(s) IV Push once  docusate sodium 100 milliGRAM(s) Oral three times a day  enoxaparin Injectable 40 milliGRAM(s) SubCutaneous at bedtime  insulin glargine Injectable (LANTUS) 10 Unit(s) SubCutaneous at bedtime  insulin lispro (HumaLOG) corrective regimen sliding scale   SubCutaneous Before meals and at bedtime  lactated ringers. 1000 milliLiter(s) (75 mL/Hr) IV Continuous <Continuous>  nicotine -  14 mG/24Hr(s) Patch 1 patch Transdermal daily  pantoprazole    Tablet 40 milliGRAM(s) Oral two times a day  polyethylene glycol 3350 17 Gram(s) Oral at bedtime  senna 2 Tablet(s) Oral at bedtime  tamsulosin 0.8 milliGRAM(s) Oral at bedtime    MEDICATIONS  (PRN):  acetaminophen   Tablet .. 650 milliGRAM(s) Oral every 6 hours PRN Mild Pain (1 - 3)  aluminum hydroxide/magnesium hydroxide/simethicone Suspension 30 milliLiter(s) Oral every 4 hours PRN Dyspepsia  dextrose 40% Gel 15 Gram(s) Oral once PRN Blood Glucose LESS THAN 70 milliGRAM(s)/deciliter  glucagon  Injectable 1 milliGRAM(s) IntraMuscular once PRN Glucose LESS THAN 70 milligrams/deciliter  nitroglycerin     SubLingual 0.4 milliGRAM(s) SubLingual every 5 minutes PRN Chest Pain  oxyCODONE    5 mG/acetaminophen 325 mG 1 Tablet(s) Oral every 4 hours PRN Moderate Pain (4 - 6)  oxyCODONE    5 mG/acetaminophen 325 mG 2 Tablet(s) Oral every 6 hours PRN Severe Pain (7 - 10)    Objective   Vascular: DP/PT palpable, CFT<3, Temp gradient warm to cool pedal hair absent, no edema present at this time   Derm:left foot sutures intact no dehiscence post surgical erythema no edema   Musc: hammer toe deformity 2-5 with overlapping of digits, pes planus, HAV deformity   Nuero: protective sensation absent

## 2019-05-29 NOTE — PROGRESS NOTE ADULT - SUBJECTIVE AND OBJECTIVE BOX
[facility] -- Bolivar Carbajal, El, Brittany, Zahra, Román Jacobs  Office # [phone]      Follow Up:    Afib., Preop Eval  Subjective/Observations:   No events overnight resting comfortably in bed.  No complaints of chest pain, dyspnea, or palpitations reported. No signs of orthopnea or PND.     REVIEW OF SYSTEMS: All other review of systems is negative unless indicated above    PAST MEDICAL & SURGICAL HISTORY:  Diabetes mellitus with no complication  No significant past surgical history      MEDICATIONS  (STANDING):  amiodarone    Tablet 200 milliGRAM(s) Oral daily  ceFAZolin   IVPB 2000 milliGRAM(s) IV Intermittent every 8 hours  chlorhexidine 2% Cloths 1 Application(s) Topical daily  dextrose 5%. 1000 milliLiter(s) (50 mL/Hr) IV Continuous <Continuous>  dextrose 50% Injectable 12.5 Gram(s) IV Push once  dextrose 50% Injectable 25 Gram(s) IV Push once  docusate sodium 100 milliGRAM(s) Oral three times a day  insulin glargine Injectable (LANTUS) 10 Unit(s) SubCutaneous at bedtime  insulin lispro (HumaLOG) corrective regimen sliding scale   SubCutaneous Before meals and at bedtime  lactated ringers. 1000 milliLiter(s) (75 mL/Hr) IV Continuous <Continuous>  nicotine -  14 mG/24Hr(s) Patch 1 patch Transdermal daily  ondansetron Injectable 4 milliGRAM(s) IV Push once  pantoprazole    Tablet 40 milliGRAM(s) Oral two times a day  polyethylene glycol 3350 17 Gram(s) Oral at bedtime  senna 2 Tablet(s) Oral at bedtime  tamsulosin 0.8 milliGRAM(s) Oral at bedtime    MEDICATIONS  (PRN):  acetaminophen   Tablet .. 650 milliGRAM(s) Oral every 6 hours PRN Mild Pain (1 - 3)  aluminum hydroxide/magnesium hydroxide/simethicone Suspension 30 milliLiter(s) Oral every 4 hours PRN Dyspepsia  dextrose 40% Gel 15 Gram(s) Oral once PRN Blood Glucose LESS THAN 70 milliGRAM(s)/deciliter  glucagon  Injectable 1 milliGRAM(s) IntraMuscular once PRN Glucose LESS THAN 70 milligrams/deciliter  nitroglycerin     SubLingual 0.4 milliGRAM(s) SubLingual every 5 minutes PRN Chest Pain  oxyCODONE    5 mG/acetaminophen 325 mG 1 Tablet(s) Oral every 4 hours PRN Moderate Pain (4 - 6)  oxyCODONE    5 mG/acetaminophen 325 mG 2 Tablet(s) Oral every 6 hours PRN Severe Pain (7 - 10)      Allergies    No Known Allergies    Intolerances        Vital Signs Last 24 Hrs  T(C): 37.4 (29 May 2019 07:50), Max: 37.7 (28 May 2019 18:42)  T(F): 99.4 (29 May 2019 07:50), Max: 99.8 (28 May 2019 18:42)  HR: 69 (29 May 2019 07:50) (53 - 72)  BP: 123/71 (29 May 2019 07:50) (111/66 - 145/69)  BP(mean): --  RR: 20 (29 May 2019 07:50) (12 - 20)  SpO2: 94% (29 May 2019 07:50) (94% - 99%)    I&O's Summary    28 May 2019 07:01  -  29 May 2019 07:00  --------------------------------------------------------  IN: 490 mL / OUT: 2600 mL / NET: -2110 mL      Weight (kg): 81.6 (05-28 @ 15:07)    PHYSICAL EXAM:  TELE: Off tele   Constitutional: NAD, awake and alert, well-developed  HEENT: Moist Mucous Membranes, Anicteric  Pulmonary: Non-labored, breath sounds are clear bilaterally, No wheezing, crackles or rhonchi  Cardiovascular: Regular, S1 and S2 nl, No murmurs, rubs, gallops or clicks  Gastrointestinal: Bowel Sounds present, soft, nontender.   Lymph: No lymphadenopathy. No peripheral edema.  Skin: No visible rashes or ulcers.  Psych:  Mood & affect appropriate    LABS: All Labs Reviewed:                        7.8    10.18 )-----------( 405      ( 29 May 2019 06:09 )             24.3                         8.0    7.04  )-----------( 437      ( 28 May 2019 05:44 )             24.5                         8.1    7.80  )-----------( 431      ( 27 May 2019 06:54 )             25.6     29 May 2019 06:09    141    |  107    |  23     ----------------------------<  226    4.2     |  29     |  1.40   28 May 2019 05:44    142    |  108    |  22     ----------------------------<  136    3.9     |  29     |  1.20   27 May 2019 06:54    142    |  106    |  22     ----------------------------<  108    4.1     |  30     |  1.30     Ca    8.1        29 May 2019 06:09  Ca    8.3        28 May 2019 05:44  Ca    8.3        27 May 2019 06:54       from: TTE Echo Doppler w/o Cont (05.16.19 @ 09:45) >     EXAM:  ECHO TTE WO CON COMP W DOPPLR         PROCEDURE DATE:  05/16/2019        INTERPRETATION:  INDICATION: Elevated troponin    Blood Pressure 137/62    Height 187.96 cm     Weight 81.6 kg       BSA   2.1 sq m    Dimensions:    LA 4.1       Normal Values: 2.0 - 4.0 cm    Ao 3.6        Normal Values: 2.0 - 3.8 cm  SEPTUM 1.3       Normal Values: 0.6 - 1.2 cm  PWT 1.3       Normal Values: 0.6 - 1.1 cm  LVIDd 6.0         Normal Values: 3.0 - 5.6 cm  LVIDs 3.1         Normal Values: 1.8 - 4.0 cm      OBSERVATIONS:    Mitral Valve: Thickened leaflets, moderate MR.  Aortic Valve/Aorta: Sclerotic trileaflet aortic valve with normal   opening. Trace AI  Tricuspid Valve: normal with mild TR.  Pulmonic Valve: normal  Left Atrium: Mildly dilated  Right Atrium: normal  Left Ventricle: normal LV size and systolic function, estimated LVEF of   65%. No evidence of segmental dysfunction. Basal septal hypertrophy is   present  Right Ventricle: normal size and systolic function.  Pericardium/Pleura: normal, no significant pericardial effusion.    Conclusion:     normal LV size and systolic function, estimated LVEF of 65%. No evidence   of segmental dysfunction  Normal RV size and systolic function.   Mild left atrial enlargement  Sclerotic trileaflet aortic valve with normal opening. Trace AI  Mitral valve with thickened leaflets and moderate MR  Mild TR  No significant pericardial effusion.        < end of copied text >    < from: 12 Lead ECG (05.22.19 @ 11:35) >  Ventricular Rate 65 BPM    Atrial Rate 65 BPM    P-R Interval 132 ms    QRS Duration 86 ms    Q-T Interval 428 ms    QTC Calculation(Bezet) 445 ms    P Axis 37 degrees    R Axis 23 degrees    T Axis 57 degrees    < end of copied text >      < from: US Duplex Venous Upper Ext Ltd, Right (05.15.19 @ 11:26) >  EXAM:  US DPLX UPR EXT VEINS LTD RT                          PROCEDURE DATE:  05/15/2019      INTERPRETATION:  CLINICAL INFORMATION: Right upper extremity swelling.    COMPARISON: None available.    TECHNIQUE: Duplex sonography of the RIGHT UPPER extremity with color and   spectral Doppler, with and without compression.      FINDINGS:    The right internal jugular, subclavian, axillary, brachial, basilic and   cephalic veins are patent and compressible where applicable.     Doppler examination shows normal spontaneous and phasic flow.    IMPRESSION:     No evidence of right upper extremity deep venous thrombosis.    < end of copied text >    MR foot   IMPRESSION:   First distal metatarsal osteomyelitis, with enhancement in the   surrounding soft tissues. Soft tissue wound/ulceration, no focal soft   tissue abscess.  Chronic appearing irregularity at the distal fifth metatarsal, without   acute bone marrow edema enhancement.  Chronic-appearing deformity of the distal second metatarsal, with   dislocation at the second MTP joint.    < from: Xray Chest 1 View- PORTABLE-Routine (05.12.19 @ 05:45) >    EXAM:  XR CHEST PORTABLE ROUTINE 1V                          PROCEDURE DATE:  05/12/2019      INTERPRETATION:  Clinical information: Productive cough    Portable study, 5:39 AM    Comparison study dated 5/10/2019, 10:29 PM.    Cardiac monitorleads present. A nasogastric tube is present, the distal   end of the tube enters the stomach in the left upper quadrant.    Hazy appearance the lung bases likely related to a combination of   vascular shadows and overlying soft tissues. However recommend a   follow-up study, PA and lateral views of the chest to better define the   lung bases.    Heart size upper limits of normal magnified secondary to portable   technique. No acute osseous abnormalities.    IMPRESSION: See above report    LETY LONDONO M.D.,ATTENDING RADIOLOGIST  This document has been electronically signed. May 12 2019 12:53PM      < end of copied text >         Ed Paula [facility]   Cardiology

## 2019-05-29 NOTE — PROGRESS NOTE ADULT - ASSESSMENT
Patient is a 48y old  Male who presents with a chief complaint of perforated stomach ulcer (29 May 2019 11:07) seen by podiatry for left foot hallux OM s/p left foot hallux amputation and met head resection

## 2019-05-29 NOTE — PROGRESS NOTE ADULT - PROBLEM SELECTOR PLAN 1
first distal metatarsal osteomyelitis noted on MRI. POD1 from L foot hallux amputation and partial ray resection.  fu surgical path  podiatry: Geoffrey  pain control with percocet  bowel regimen  dispo planning

## 2019-05-29 NOTE — PROGRESS NOTE ADULT - SUBJECTIVE AND OBJECTIVE BOX
Patient is a 48y old  Male who presents with a chief complaint of perforated stomach ulcer (29 May 2019 08:49)      FROM ADMISSION H+P:   HPI:  48 year old man, had sudden onset at 7 PM of abdominal pain (11 May 2019 00:30)      ----  INTERVAL HPI/OVERNIGHT EVENTS: Pt seen and evaluated at the bedside. No acute overnight events occurred. POD1 L foot hallux amputation and partial ray resection. pt endorses some constipation. Mild pain at surgical site.    ----  PAST MEDICAL & SURGICAL HISTORY:  Diabetes mellitus with no complication  No significant past surgical history      FAMILY HISTORY:      ----  MEDICATIONS  (STANDING):  amiodarone    Tablet 200 milliGRAM(s) Oral daily  ceFAZolin   IVPB 2000 milliGRAM(s) IV Intermittent every 8 hours  chlorhexidine 2% Cloths 1 Application(s) Topical daily  dextrose 5%. 1000 milliLiter(s) (50 mL/Hr) IV Continuous <Continuous>  dextrose 50% Injectable 12.5 Gram(s) IV Push once  dextrose 50% Injectable 25 Gram(s) IV Push once  docusate sodium 100 milliGRAM(s) Oral three times a day  insulin glargine Injectable (LANTUS) 10 Unit(s) SubCutaneous at bedtime  insulin lispro (HumaLOG) corrective regimen sliding scale   SubCutaneous Before meals and at bedtime  lactated ringers. 1000 milliLiter(s) (75 mL/Hr) IV Continuous <Continuous>  nicotine -  14 mG/24Hr(s) Patch 1 patch Transdermal daily  ondansetron Injectable 4 milliGRAM(s) IV Push once  pantoprazole    Tablet 40 milliGRAM(s) Oral two times a day  polyethylene glycol 3350 17 Gram(s) Oral at bedtime  senna 2 Tablet(s) Oral at bedtime  tamsulosin 0.8 milliGRAM(s) Oral at bedtime    MEDICATIONS  (PRN):  acetaminophen   Tablet .. 650 milliGRAM(s) Oral every 6 hours PRN Mild Pain (1 - 3)  aluminum hydroxide/magnesium hydroxide/simethicone Suspension 30 milliLiter(s) Oral every 4 hours PRN Dyspepsia  dextrose 40% Gel 15 Gram(s) Oral once PRN Blood Glucose LESS THAN 70 milliGRAM(s)/deciliter  glucagon  Injectable 1 milliGRAM(s) IntraMuscular once PRN Glucose LESS THAN 70 milligrams/deciliter  nitroglycerin     SubLingual 0.4 milliGRAM(s) SubLingual every 5 minutes PRN Chest Pain  oxyCODONE    5 mG/acetaminophen 325 mG 1 Tablet(s) Oral every 4 hours PRN Moderate Pain (4 - 6)  oxyCODONE    5 mG/acetaminophen 325 mG 2 Tablet(s) Oral every 6 hours PRN Severe Pain (7 - 10)      ----  REVIEW OF SYSTEMS:  CONSTITUTIONAL: denies fever, chills, fatigue, weakness  HEENT: denies blurred vision, sore throat  SKIN: denies new lesions, rash  CARDIOVASCULAR: denies chest pain, chest pressure, palpitations  RESPIRATORY: denies shortness of breath, sputum production  GASTROINTESTINAL: denies nausea, vomiting, diarrhea, abdominal pain  GENITOURINARY: denies dysuria, discharge  NEUROLOGICAL: denies numbness, headache, focal weakness  MUSCULOSKELETAL: denies new joint pain, muscle aches    ----  PHYSICAL EXAM:  General: Well developed, well nourished, NAD  HEENT: NCAT, moist mucous membranes   Neurology: A&Ox3, nonfocal  Respiratory: CTA B/L, No wheezing, rales, or rhonchi  CV: RRR, S1/S2 present, no murmurs  Abdominal: Soft, nontender, non-distended, normoactive bowel sounds  Extremities: no edema, peripheral pulses present, Left foot dressing clean, dry, intact, surgical site c/d/i  MSK: Normal ROM, no joint erythema or warmth, no joint swelling   Skin: warm, dry, normal color    T(C): 37.4 (05-29-19 @ 07:50), Max: 37.7 (05-28-19 @ 18:42)  HR: 69 (05-29-19 @ 07:50) (53 - 72)  BP: 123/71 (05-29-19 @ 07:50) (111/66 - 145/69)  RR: 20 (05-29-19 @ 07:50) (12 - 20)  SpO2: 94% (05-29-19 @ 07:50) (94% - 99%)  Wt(kg): --    ----  I&O's Summary    28 May 2019 07:01  -  29 May 2019 07:00  --------------------------------------------------------  IN: 490 mL / OUT: 2600 mL / NET: -2110 mL        LABS:                        7.8    10.18 )-----------( 405      ( 29 May 2019 06:09 )             24.3     05-29    141  |  107  |  23  ----------------------------<  226<H>  4.2   |  29  |  1.40<H>    Ca    8.1<L>      29 May 2019 06:09          CAPILLARY BLOOD GLUCOSE      POCT Blood Glucose.: 173 mg/dL (29 May 2019 08:05)  POCT Blood Glucose.: 231 mg/dL (28 May 2019 21:21)  POCT Blood Glucose.: 103 mg/dL (28 May 2019 18:37)  POCT Blood Glucose.: 121 mg/dL (28 May 2019 17:02)  POCT Blood Glucose.: 110 mg/dL (28 May 2019 15:38)  POCT Blood Glucose.: 138 mg/dL (28 May 2019 11:35)              Culture - Tissue with Gram Stain (collected 05-29-19 @ 01:11)  Source: .Tissue Other, bone left 1st metatarsal head  Gram Stain (05-29-19 @ 06:01):    Rare polymorphonuclear leukocytes seen per low power field    No organisms seen per oil power field    Culture - Tissue with Gram Stain (collected 05-29-19 @ 01:11)  Source: .Tissue Other, bone left great toe  Gram Stain (05-29-19 @ 06:00):    No polymorphonuclear cells seen per low power field    No organisms seen per oil power field        ----  Personally reviewed:  Vital sign trends: [x  ] yes    [  ] no     [  ] n/a  Laboratory results: [x  ] yes    [  ] no     [  ] n/a  Radiology results: [ x ] yes    [  ] no     [  ] n/a  Culture results: [x  ] yes    [  ] no     [  ] n/a  Consultant recommendations: [x  ] yes    [  ] no     [  ] n/a

## 2019-05-29 NOTE — PROGRESS NOTE ADULT - SUBJECTIVE AND OBJECTIVE BOX
CAPILLARY BLOOD GLUCOSE      POCT Blood Glucose.: 173 mg/dL (29 May 2019 08:05)  POCT Blood Glucose.: 231 mg/dL (28 May 2019 21:21)  POCT Blood Glucose.: 103 mg/dL (28 May 2019 18:37)  POCT Blood Glucose.: 121 mg/dL (28 May 2019 17:02)  POCT Blood Glucose.: 110 mg/dL (28 May 2019 15:38)  POCT Blood Glucose.: 138 mg/dL (28 May 2019 11:35)      Vital Signs Last 24 Hrs  T(C): 37.4 (29 May 2019 07:50), Max: 37.7 (28 May 2019 18:42)  T(F): 99.4 (29 May 2019 07:50), Max: 99.8 (28 May 2019 18:42)  HR: 69 (29 May 2019 07:50) (53 - 72)  BP: 123/71 (29 May 2019 07:50) (111/66 - 145/69)  BP(mean): --  RR: 20 (29 May 2019 07:50) (12 - 20)  SpO2: 94% (29 May 2019 07:50) (94% - 99%)    General: WN/WD NAD  Respiratory: CTA B/L  CV: RRR, S1S2, no murmurs, rubs or gallops  Abdominal: Soft, NT, ND +BS, Last BM  Extremities: No edema, + peripheral pulses     05-29    141  |  107  |  23  ----------------------------<  226<H>  4.2   |  29  |  1.40<H>    Ca    8.1<L>      29 May 2019 06:09        dextrose 40% Gel 15 Gram(s) Oral once PRN  dextrose 50% Injectable 12.5 Gram(s) IV Push once  dextrose 50% Injectable 25 Gram(s) IV Push once  glucagon  Injectable 1 milliGRAM(s) IntraMuscular once PRN  insulin glargine Injectable (LANTUS) 10 Unit(s) SubCutaneous at bedtime  insulin lispro (HumaLOG) corrective regimen sliding scale   SubCutaneous Before meals and at bedtime

## 2019-05-30 LAB
-  AMPICILLIN/SULBACTAM: SIGNIFICANT CHANGE UP
-  AMPICILLIN/SULBACTAM: SIGNIFICANT CHANGE UP
-  CEFAZOLIN: SIGNIFICANT CHANGE UP
-  CEFAZOLIN: SIGNIFICANT CHANGE UP
-  CLINDAMYCIN: SIGNIFICANT CHANGE UP
-  CLINDAMYCIN: SIGNIFICANT CHANGE UP
-  ERYTHROMYCIN: SIGNIFICANT CHANGE UP
-  ERYTHROMYCIN: SIGNIFICANT CHANGE UP
-  GENTAMICIN: SIGNIFICANT CHANGE UP
-  GENTAMICIN: SIGNIFICANT CHANGE UP
-  OXACILLIN: SIGNIFICANT CHANGE UP
-  OXACILLIN: SIGNIFICANT CHANGE UP
-  PENICILLIN: SIGNIFICANT CHANGE UP
-  PENICILLIN: SIGNIFICANT CHANGE UP
-  RIFAMPIN: SIGNIFICANT CHANGE UP
-  RIFAMPIN: SIGNIFICANT CHANGE UP
-  TETRACYCLINE: SIGNIFICANT CHANGE UP
-  TETRACYCLINE: SIGNIFICANT CHANGE UP
-  TRIMETHOPRIM/SULFAMETHOXAZOLE: SIGNIFICANT CHANGE UP
-  TRIMETHOPRIM/SULFAMETHOXAZOLE: SIGNIFICANT CHANGE UP
-  VANCOMYCIN: SIGNIFICANT CHANGE UP
-  VANCOMYCIN: SIGNIFICANT CHANGE UP
ANION GAP SERPL CALC-SCNC: 5 MMOL/L — SIGNIFICANT CHANGE UP (ref 5–17)
BUN SERPL-MCNC: 15 MG/DL — SIGNIFICANT CHANGE UP (ref 7–23)
CALCIUM SERPL-MCNC: 8.2 MG/DL — LOW (ref 8.5–10.1)
CHLORIDE SERPL-SCNC: 107 MMOL/L — SIGNIFICANT CHANGE UP (ref 96–108)
CO2 SERPL-SCNC: 29 MMOL/L — SIGNIFICANT CHANGE UP (ref 22–31)
CREAT SERPL-MCNC: 1.4 MG/DL — HIGH (ref 0.5–1.3)
GLUCOSE SERPL-MCNC: 114 MG/DL — HIGH (ref 70–99)
HCT VFR BLD CALC: 23.6 % — LOW (ref 39–50)
HGB BLD-MCNC: 7.6 G/DL — LOW (ref 13–17)
MCHC RBC-ENTMCNC: 31.9 PG — SIGNIFICANT CHANGE UP (ref 27–34)
MCHC RBC-ENTMCNC: 32.2 GM/DL — SIGNIFICANT CHANGE UP (ref 32–36)
MCV RBC AUTO: 99.2 FL — SIGNIFICANT CHANGE UP (ref 80–100)
METHOD TYPE: SIGNIFICANT CHANGE UP
METHOD TYPE: SIGNIFICANT CHANGE UP
NRBC # BLD: 0 /100 WBCS — SIGNIFICANT CHANGE UP (ref 0–0)
PLATELET # BLD AUTO: 355 K/UL — SIGNIFICANT CHANGE UP (ref 150–400)
POTASSIUM SERPL-MCNC: 4.1 MMOL/L — SIGNIFICANT CHANGE UP (ref 3.5–5.3)
POTASSIUM SERPL-SCNC: 4.1 MMOL/L — SIGNIFICANT CHANGE UP (ref 3.5–5.3)
RBC # BLD: 2.38 M/UL — LOW (ref 4.2–5.8)
RBC # FLD: 14.2 % — SIGNIFICANT CHANGE UP (ref 10.3–14.5)
SODIUM SERPL-SCNC: 141 MMOL/L — SIGNIFICANT CHANGE UP (ref 135–145)
WBC # BLD: 8.37 K/UL — SIGNIFICANT CHANGE UP (ref 3.8–10.5)
WBC # FLD AUTO: 8.37 K/UL — SIGNIFICANT CHANGE UP (ref 3.8–10.5)

## 2019-05-30 PROCEDURE — 99232 SBSQ HOSP IP/OBS MODERATE 35: CPT

## 2019-05-30 RX ORDER — ONDANSETRON 8 MG/1
4 TABLET, FILM COATED ORAL EVERY 6 HOURS
Refills: 0 | Status: DISCONTINUED | OUTPATIENT
Start: 2019-05-30 | End: 2019-05-31

## 2019-05-30 RX ORDER — INSULIN LISPRO 100/ML
3 VIAL (ML) SUBCUTANEOUS
Refills: 0 | Status: DISCONTINUED | OUTPATIENT
Start: 2019-05-30 | End: 2019-06-05

## 2019-05-30 RX ORDER — ONDANSETRON 8 MG/1
4 TABLET, FILM COATED ORAL ONCE
Refills: 0 | Status: COMPLETED | OUTPATIENT
Start: 2019-05-30 | End: 2019-05-30

## 2019-05-30 RX ADMIN — Medication 100 MILLIGRAM(S): at 13:24

## 2019-05-30 RX ADMIN — AMIODARONE HYDROCHLORIDE 200 MILLIGRAM(S): 400 TABLET ORAL at 06:27

## 2019-05-30 RX ADMIN — OXYCODONE AND ACETAMINOPHEN 2 TABLET(S): 5; 325 TABLET ORAL at 21:53

## 2019-05-30 RX ADMIN — Medication 100 MILLIGRAM(S): at 21:01

## 2019-05-30 RX ADMIN — ENOXAPARIN SODIUM 40 MILLIGRAM(S): 100 INJECTION SUBCUTANEOUS at 21:01

## 2019-05-30 RX ADMIN — TAMSULOSIN HYDROCHLORIDE 0.8 MILLIGRAM(S): 0.4 CAPSULE ORAL at 21:32

## 2019-05-30 RX ADMIN — Medication 100 MILLIGRAM(S): at 06:27

## 2019-05-30 RX ADMIN — OXYCODONE AND ACETAMINOPHEN 1 TABLET(S): 5; 325 TABLET ORAL at 13:24

## 2019-05-30 RX ADMIN — Medication 3 UNIT(S): at 17:13

## 2019-05-30 RX ADMIN — OXYCODONE AND ACETAMINOPHEN 2 TABLET(S): 5; 325 TABLET ORAL at 21:25

## 2019-05-30 RX ADMIN — PANTOPRAZOLE SODIUM 40 MILLIGRAM(S): 20 TABLET, DELAYED RELEASE ORAL at 17:13

## 2019-05-30 RX ADMIN — INSULIN GLARGINE 10 UNIT(S): 100 INJECTION, SOLUTION SUBCUTANEOUS at 21:29

## 2019-05-30 RX ADMIN — Medication 4: at 12:21

## 2019-05-30 RX ADMIN — Medication 1 PATCH: at 12:21

## 2019-05-30 RX ADMIN — SENNA PLUS 2 TABLET(S): 8.6 TABLET ORAL at 21:29

## 2019-05-30 RX ADMIN — PANTOPRAZOLE SODIUM 40 MILLIGRAM(S): 20 TABLET, DELAYED RELEASE ORAL at 06:27

## 2019-05-30 RX ADMIN — POLYETHYLENE GLYCOL 3350 17 GRAM(S): 17 POWDER, FOR SOLUTION ORAL at 21:29

## 2019-05-30 RX ADMIN — Medication 100 MILLIGRAM(S): at 15:11

## 2019-05-30 RX ADMIN — ONDANSETRON 4 MILLIGRAM(S): 8 TABLET, FILM COATED ORAL at 21:25

## 2019-05-30 RX ADMIN — SODIUM CHLORIDE 75 MILLILITER(S): 9 INJECTION, SOLUTION INTRAVENOUS at 21:01

## 2019-05-30 RX ADMIN — ONDANSETRON 4 MILLIGRAM(S): 8 TABLET, FILM COATED ORAL at 13:24

## 2019-05-30 RX ADMIN — Medication 1 PATCH: at 12:20

## 2019-05-30 RX ADMIN — Medication 4: at 21:30

## 2019-05-30 RX ADMIN — Medication 1 PATCH: at 07:50

## 2019-05-30 RX ADMIN — Medication 2: at 17:12

## 2019-05-30 RX ADMIN — OXYCODONE AND ACETAMINOPHEN 1 TABLET(S): 5; 325 TABLET ORAL at 14:20

## 2019-05-30 RX ADMIN — SODIUM CHLORIDE 75 MILLILITER(S): 9 INJECTION, SOLUTION INTRAVENOUS at 11:00

## 2019-05-30 RX ADMIN — Medication 3 UNIT(S): at 12:22

## 2019-05-30 NOTE — PROGRESS NOTE ADULT - SUBJECTIVE AND OBJECTIVE BOX
CAPILLARY BLOOD GLUCOSE      POCT Blood Glucose.: 132 mg/dL (30 May 2019 07:52)  POCT Blood Glucose.: 253 mg/dL (29 May 2019 21:36)  POCT Blood Glucose.: 159 mg/dL (29 May 2019 16:56)  POCT Blood Glucose.: 201 mg/dL (29 May 2019 12:05)      Vital Signs Last 24 Hrs  T(C): 37 (30 May 2019 07:12), Max: 37.7 (29 May 2019 23:26)  T(F): 98.6 (30 May 2019 07:12), Max: 99.9 (29 May 2019 23:26)  HR: 60 (30 May 2019 07:12) (57 - 72)  BP: 116/71 (30 May 2019 07:12) (106/65 - 117/68)  BP(mean): --  RR: 16 (30 May 2019 07:12) (16 - 21)  SpO2: 93% (30 May 2019 07:12) (93% - 99%)    General: WN/WD NAD  Respiratory: CTA B/L  CV: RRR, S1S2, no murmurs, rubs or gallops  Abdominal: Soft, NT, ND +BS, Last BM  Extremities: right le foot dsg intact     05-30    141  |  107  |  15  ----------------------------<  114<H>  4.1   |  29  |  1.40<H>    Ca    8.2<L>      30 May 2019 05:36        dextrose 40% Gel 15 Gram(s) Oral once PRN  dextrose 50% Injectable 12.5 Gram(s) IV Push once  dextrose 50% Injectable 25 Gram(s) IV Push once  glucagon  Injectable 1 milliGRAM(s) IntraMuscular once PRN  insulin glargine Injectable (LANTUS) 10 Unit(s) SubCutaneous at bedtime  insulin lispro (HumaLOG) corrective regimen sliding scale   SubCutaneous Before meals and at bedtime

## 2019-05-30 NOTE — PROGRESS NOTE ADULT - SUBJECTIVE AND OBJECTIVE BOX
Patient is a 48y old  Male who presents with a chief complaint of perforated stomach ulcer (13 May 2019 13:25) seen by podiatry for left foot medial 1st IPJ joint G2 DFU     HPI:  48 year old man, seen at bedside during podiatry rounds for left foot medial hallux G2 DFU. Patient resting in bed conformably in NAD. Patient denies N/F/V/SOB/CP or calf pain at this time. Patient is s/p left foot hallux and 1st met amputation Patient denies any further pedal complaints at this time.    ICU Vital Signs Last 24 Hrs  T(C): 37.1 (30 May 2019 11:25), Max: 37.7 (29 May 2019 23:26)  T(F): 98.8 (30 May 2019 11:25), Max: 99.9 (29 May 2019 23:26)  HR: 64 (30 May 2019 11:35) (57 - 72)  BP: 110/67 (30 May 2019 11:35) (106/65 - 117/68)  BP(mean): --  ABP: --  ABP(mean): --  RR: 18 (30 May 2019 11:25) (16 - 18)  SpO2: 93% (30 May 2019 11:35) (93% - 99%)                          7.6    8.37  )-----------( 355      ( 30 May 2019 05:36 )             23.6   05-30    141  |  107  |  15  ----------------------------<  114<H>  4.1   |  29  |  1.40<H>    Ca    8.2<L>      30 May 2019 05:36      MEDICATIONS  (STANDING):  amiodarone    Tablet 200 milliGRAM(s) Oral daily  ceFAZolin   IVPB 2000 milliGRAM(s) IV Intermittent every 8 hours  dextrose 5%. 1000 milliLiter(s) (50 mL/Hr) IV Continuous <Continuous>  dextrose 50% Injectable 12.5 Gram(s) IV Push once  dextrose 50% Injectable 25 Gram(s) IV Push once  docusate sodium 100 milliGRAM(s) Oral three times a day  enoxaparin Injectable 40 milliGRAM(s) SubCutaneous at bedtime  insulin glargine Injectable (LANTUS) 10 Unit(s) SubCutaneous at bedtime  insulin lispro (HumaLOG) corrective regimen sliding scale   SubCutaneous Before meals and at bedtime  insulin lispro Injectable (HumaLOG) 3 Unit(s) SubCutaneous three times a day before meals  lactated ringers. 1000 milliLiter(s) (75 mL/Hr) IV Continuous <Continuous>  nicotine -  14 mG/24Hr(s) Patch 1 patch Transdermal daily  pantoprazole    Tablet 40 milliGRAM(s) Oral two times a day  polyethylene glycol 3350 17 Gram(s) Oral at bedtime  senna 2 Tablet(s) Oral at bedtime  tamsulosin 0.8 milliGRAM(s) Oral at bedtime    MEDICATIONS  (PRN):  acetaminophen   Tablet .. 650 milliGRAM(s) Oral every 6 hours PRN Mild Pain (1 - 3)  aluminum hydroxide/magnesium hydroxide/simethicone Suspension 30 milliLiter(s) Oral every 4 hours PRN Dyspepsia  dextrose 40% Gel 15 Gram(s) Oral once PRN Blood Glucose LESS THAN 70 milliGRAM(s)/deciliter  glucagon  Injectable 1 milliGRAM(s) IntraMuscular once PRN Glucose LESS THAN 70 milligrams/deciliter  nitroglycerin     SubLingual 0.4 milliGRAM(s) SubLingual every 5 minutes PRN Chest Pain  oxyCODONE    5 mG/acetaminophen 325 mG 1 Tablet(s) Oral every 4 hours PRN Moderate Pain (4 - 6)  oxyCODONE    5 mG/acetaminophen 325 mG 2 Tablet(s) Oral every 6 hours PRN Severe Pain (7 - 10)    Objective   Vascular: DP/PT palpable, CFT<3, Temp gradient warm to cool pedal hair absent, no edema present at this time   Derm:left foot sutures intact no dehiscence post surgical erythema no edema   Musc: hammer toe deformity 2-5 with overlapping of digits, pes planus, HAV deformity   Nuero: protective sensation absent

## 2019-05-30 NOTE — PROGRESS NOTE ADULT - ASSESSMENT
48 year old male with HTN and DM2, who presented with Perforated Antral Gastric Ulcer, POD 5 s/p Emergent Exp-lap Oomentopexy and plication. He had an RRT for atrial fibrillation with rapid ventricular response, which is new. He has had difficult to control periods of tachycardia and bradycardia, currently in a sinus rhythm this morning   He had an elevated troponin possibly suggestive of non-ST segment elevation myocardial infarction/demand ischemia. He has no known history of coronary artery disease    Afib  - Remains in SR.  Currently off monitor  - Continue Amio, plan is to stop Amio as an outpatient  - We can consider BB once off Amiodarone  - TTE with normal LV function, moderate MR   - Afib was short lived ( no need for long term AC)  - H/H remain stable.  - Maintain  K>4, Mg>2  - If no recurrence of Afib while inpatient, we recommend an event monitor as outpatient.    Anemia  - H&H noted  - As per primary team  - Off full dose AC    S/P omentopexy and plication of gastric ulcer   - Tolerated procedure well, however, had Afib postop, spontaneously to NSR  - Pain control    - MRI with left foot osteomyelitis and now S/P  left hallux resection with debridement tolerated procedure w/o cardiac compromise   - All other workup per Primary. Will continue to follow     Ed Paula [facility]  Cardiology 48 year old male with HTN and DM2, who presented with Perforated Antral Gastric Ulcer, POD 5 s/p Emergent Exp-lap Oomentopexy and plication. He had an RRT for atrial fibrillation with rapid ventricular response, which is new. He has had difficult to control periods of tachycardia and bradycardia, currently in a sinus rhythm this morning   He had an elevated troponin possibly suggestive of non-ST segment elevation myocardial infarction/demand ischemia. He has no known history of coronary artery disease    Afib  - Remains in SR clinically.  Currently off monitor  - Continue Amio, plan is to stop Amio as an outpatient  - We can consider BB once off Amiodarone  - TTE with normal LV function, moderate MR   - Afib was short lived ( no need for long term AC)  - H/H remain stable.  - Maintain  K>4, Mg>2  - If no recurrence of Afib while inpatient, we recommend an event monitor as outpatient.    Anemia  - H&H noted  - As per primary team  - Off full dose AC    S/P omentopexy and plication of gastric ulcer   - Tolerated procedure well, however, had Afib postop, spontaneously to NSR  - Pain control    - MRI with left foot osteomyelitis and now S/P  left hallux resection with debridement tolerated procedure w/o cardiac compromise   - All other workup per Primary. Will continue to follow     Ed Paula [facility]  Cardiology

## 2019-05-30 NOTE — PROGRESS NOTE ADULT - ATTENDING COMMENTS
Patient seen and evaluated   Wound cleaned with NS and dressed with DSD   f/u OR path and Cx   Patient to ambulate with surgical shoe and partial weight bearing to heel   Patient to continue abx based on ID recommendations   Upon discharge patient will keep dressings clean dry and intact until follow up in one week with Dr Hale

## 2019-05-30 NOTE — PROGRESS NOTE ADULT - SUBJECTIVE AND OBJECTIVE BOX
[facility] -- Bolivar Carbajal, El, Brittany, Reynaldo Sweeney Savella  Office # [phone]      Follow Up:    Afib   Subjective/Observations:   No events overnight resting comfortably in bed.  No complaints of chest pain, dyspnea, or palpitations reported. No signs of orthopnea or PND.     REVIEW OF SYSTEMS: All other review of systems is negative unless indicated above    PAST MEDICAL & SURGICAL HISTORY:  Diabetes mellitus with no complication  No significant past surgical history      MEDICATIONS  (STANDING):  amiodarone    Tablet 200 milliGRAM(s) Oral daily  ceFAZolin   IVPB 2000 milliGRAM(s) IV Intermittent every 8 hours  chlorhexidine 2% Cloths 1 Application(s) Topical daily  dextrose 5%. 1000 milliLiter(s) (50 mL/Hr) IV Continuous <Continuous>  dextrose 50% Injectable 12.5 Gram(s) IV Push once  dextrose 50% Injectable 25 Gram(s) IV Push once  docusate sodium 100 milliGRAM(s) Oral three times a day  enoxaparin Injectable 40 milliGRAM(s) SubCutaneous at bedtime  insulin glargine Injectable (LANTUS) 10 Unit(s) SubCutaneous at bedtime  insulin lispro (HumaLOG) corrective regimen sliding scale   SubCutaneous Before meals and at bedtime  lactated ringers. 1000 milliLiter(s) (75 mL/Hr) IV Continuous <Continuous>  nicotine -  14 mG/24Hr(s) Patch 1 patch Transdermal daily  pantoprazole    Tablet 40 milliGRAM(s) Oral two times a day  polyethylene glycol 3350 17 Gram(s) Oral at bedtime  senna 2 Tablet(s) Oral at bedtime  tamsulosin 0.8 milliGRAM(s) Oral at bedtime    MEDICATIONS  (PRN):  acetaminophen   Tablet .. 650 milliGRAM(s) Oral every 6 hours PRN Mild Pain (1 - 3)  aluminum hydroxide/magnesium hydroxide/simethicone Suspension 30 milliLiter(s) Oral every 4 hours PRN Dyspepsia  dextrose 40% Gel 15 Gram(s) Oral once PRN Blood Glucose LESS THAN 70 milliGRAM(s)/deciliter  glucagon  Injectable 1 milliGRAM(s) IntraMuscular once PRN Glucose LESS THAN 70 milligrams/deciliter  nitroglycerin     SubLingual 0.4 milliGRAM(s) SubLingual every 5 minutes PRN Chest Pain  oxyCODONE    5 mG/acetaminophen 325 mG 1 Tablet(s) Oral every 4 hours PRN Moderate Pain (4 - 6)  oxyCODONE    5 mG/acetaminophen 325 mG 2 Tablet(s) Oral every 6 hours PRN Severe Pain (7 - 10)      Allergies    No Known Allergies    Intolerances        Vital Signs Last 24 Hrs  T(C): 37 (30 May 2019 07:12), Max: 37.7 (29 May 2019 23:26)  T(F): 98.6 (30 May 2019 07:12), Max: 99.9 (29 May 2019 23:26)  HR: 60 (30 May 2019 07:12) (57 - 72)  BP: 116/71 (30 May 2019 07:12) (106/65 - 117/68)  BP(mean): --  RR: 16 (30 May 2019 07:12) (16 - 21)  SpO2: 93% (30 May 2019 07:12) (93% - 99%)    I&O's Summary    29 May 2019 07:01  -  30 May 2019 07:00  --------------------------------------------------------  IN: 1190 mL / OUT: 3300 mL / NET: -2110 mL    30 May 2019 07:01  -  30 May 2019 09:09  --------------------------------------------------------  IN: 600 mL / OUT: 0 mL / NET: 600 mL          PHYSICAL EXAM:  TELE: Off tele   Constitutional: NAD, awake and alert, well-developed  HEENT: Moist Mucous Membranes, Anicteric  Pulmonary: Non-labored, breath sounds are clear bilaterally, No wheezing, crackles or rhonchi  Cardiovascular: Regular, S1 and S2 nl, + murmur No rubs, gallops or clicks   Gastrointestinal: Bowel Sounds present, soft, nontender.   Lymph: No lymphadenopathy. No peripheral edema.  Skin: No visible rashes or ulcers.  Psych:  Mood & affect appropriate    LABS: All Labs Reviewed:                        7.6    8.37  )-----------( 355      ( 30 May 2019 05:36 )             23.6                         7.8    10.18 )-----------( 405      ( 29 May 2019 06:09 )             24.3                         8.0    7.04  )-----------( 437      ( 28 May 2019 05:44 )             24.5     30 May 2019 05:36    141    |  107    |  15     ----------------------------<  114    4.1     |  29     |  1.40   29 May 2019 06:09    141    |  107    |  23     ----------------------------<  226    4.2     |  29     |  1.40   28 May 2019 05:44    142    |  108    |  22     ----------------------------<  136    3.9     |  29     |  1.20     Ca    8.2        30 May 2019 05:36  Ca    8.1        29 May 2019 06:09  Ca    8.3        28 May 2019 05:44       from: TTE Echo Doppler w/o Cont (05.16.19 @ 09:45) >     EXAM:  ECHO TTE WO CON COMP W DOPPLR         PROCEDURE DATE:  05/16/2019        INTERPRETATION:  INDICATION: Elevated troponin    Blood Pressure 137/62    Height 187.96 cm     Weight 81.6 kg       BSA   2.1 sq m    Dimensions:    LA 4.1       Normal Values: 2.0 - 4.0 cm    Ao 3.6        Normal Values: 2.0 - 3.8 cm  SEPTUM 1.3       Normal Values: 0.6 - 1.2 cm  PWT 1.3       Normal Values: 0.6 - 1.1 cm  LVIDd 6.0         Normal Values: 3.0 - 5.6 cm  LVIDs 3.1         Normal Values: 1.8 - 4.0 cm      OBSERVATIONS:    Mitral Valve: Thickened leaflets, moderate MR.  Aortic Valve/Aorta: Sclerotic trileaflet aortic valve with normal   opening. Trace AI  Tricuspid Valve: normal with mild TR.  Pulmonic Valve: normal  Left Atrium: Mildly dilated  Right Atrium: normal  Left Ventricle: normal LV size and systolic function, estimated LVEF of   65%. No evidence of segmental dysfunction. Basal septal hypertrophy is   present  Right Ventricle: normal size and systolic function.  Pericardium/Pleura: normal, no significant pericardial effusion.    Conclusion:     normal LV size and systolic function, estimated LVEF of 65%. No evidence   of segmental dysfunction  Normal RV size and systolic function.   Mild left atrial enlargement  Sclerotic trileaflet aortic valve with normal opening. Trace AI  Mitral valve with thickened leaflets and moderate MR  Mild TR  No significant pericardial effusion.        < end of copied text >    < from: 12 Lead ECG (05.22.19 @ 11:35) >  Ventricular Rate 65 BPM    Atrial Rate 65 BPM    P-R Interval 132 ms    QRS Duration 86 ms    Q-T Interval 428 ms    QTC Calculation(Bezet) 445 ms    P Axis 37 degrees    R Axis 23 degrees    T Axis 57 degrees    < end of copied text >      < from: US Duplex Venous Upper Ext Ltd, Right (05.15.19 @ 11:26) >  EXAM:  US DPLX UPR EXT VEINS LTD RT                          PROCEDURE DATE:  05/15/2019      INTERPRETATION:  CLINICAL INFORMATION: Right upper extremity swelling.    COMPARISON: None available.    TECHNIQUE: Duplex sonography of the RIGHT UPPER extremity with color and   spectral Doppler, with and without compression.      FINDINGS:    The right internal jugular, subclavian, axillary, brachial, basilic and   cephalic veins are patent and compressible where applicable.     Doppler examination shows normal spontaneous and phasic flow.    IMPRESSION:     No evidence of right upper extremity deep venous thrombosis.    < end of copied text >    MR foot   IMPRESSION:   First distal metatarsal osteomyelitis, with enhancement in the   surrounding soft tissues. Soft tissue wound/ulceration, no focal soft   tissue abscess.  Chronic appearing irregularity at the distal fifth metatarsal, without   acute bone marrow edema enhancement.  Chronic-appearing deformity of the distal second metatarsal, with   dislocation at the second MTP joint.    < from: Xray Chest 1 View- PORTABLE-Routine (05.12.19 @ 05:45) >    EXAM:  XR CHEST PORTABLE ROUTINE 1V                          PROCEDURE DATE:  05/12/2019      INTERPRETATION:  Clinical information: Productive cough    Portable study, 5:39 AM    Comparison study dated 5/10/2019, 10:29 PM.    Cardiac monitorleads present. A nasogastric tube is present, the distal   end of the tube enters the stomach in the left upper quadrant.    Hazy appearance the lung bases likely related to a combination of   vascular shadows and overlying soft tissues. However recommend a   follow-up study, PA and lateral views of the chest to better define the   lung bases.    Heart size upper limits of normal magnified secondary to portable   technique. No acute osseous abnormalities.    IMPRESSION: See above report    LETY LONDONO M.D.,ATTENDING RADIOLOGIST  This document has been electronically signed. May 12 2019 12:53PM      < end of copied text >    Ed Paula ANP   Cardiology

## 2019-05-30 NOTE — PROGRESS NOTE ADULT - SUBJECTIVE AND OBJECTIVE BOX
Patient is a 48y old  Male who presents with a chief complaint of perforated stomach ulcer (29 May 2019 16:41)      FROM ADMISSION H+P:   HPI:  48 year old man, had sudden onset at 7 PM of abdominal pain (11 May 2019 00:30)      ----  INTERVAL HPI/OVERNIGHT EVENTS: Pt seen and evaluated at the bedside. No acute overnight events occurred. reports no complaints denies pain     ----  PAST MEDICAL & SURGICAL HISTORY:  Diabetes mellitus with no complication  No significant past surgical history      FAMILY HISTORY:      ----  MEDICATIONS  (STANDING):  amiodarone    Tablet 200 milliGRAM(s) Oral daily  ceFAZolin   IVPB 2000 milliGRAM(s) IV Intermittent every 8 hours  chlorhexidine 2% Cloths 1 Application(s) Topical daily  dextrose 5%. 1000 milliLiter(s) (50 mL/Hr) IV Continuous <Continuous>  dextrose 50% Injectable 12.5 Gram(s) IV Push once  dextrose 50% Injectable 25 Gram(s) IV Push once  docusate sodium 100 milliGRAM(s) Oral three times a day  enoxaparin Injectable 40 milliGRAM(s) SubCutaneous at bedtime  insulin glargine Injectable (LANTUS) 10 Unit(s) SubCutaneous at bedtime  insulin lispro (HumaLOG) corrective regimen sliding scale   SubCutaneous Before meals and at bedtime  lactated ringers. 1000 milliLiter(s) (75 mL/Hr) IV Continuous <Continuous>  nicotine -  14 mG/24Hr(s) Patch 1 patch Transdermal daily  pantoprazole    Tablet 40 milliGRAM(s) Oral two times a day  polyethylene glycol 3350 17 Gram(s) Oral at bedtime  senna 2 Tablet(s) Oral at bedtime  tamsulosin 0.8 milliGRAM(s) Oral at bedtime    MEDICATIONS  (PRN):  acetaminophen   Tablet .. 650 milliGRAM(s) Oral every 6 hours PRN Mild Pain (1 - 3)  aluminum hydroxide/magnesium hydroxide/simethicone Suspension 30 milliLiter(s) Oral every 4 hours PRN Dyspepsia  dextrose 40% Gel 15 Gram(s) Oral once PRN Blood Glucose LESS THAN 70 milliGRAM(s)/deciliter  glucagon  Injectable 1 milliGRAM(s) IntraMuscular once PRN Glucose LESS THAN 70 milligrams/deciliter  nitroglycerin     SubLingual 0.4 milliGRAM(s) SubLingual every 5 minutes PRN Chest Pain  oxyCODONE    5 mG/acetaminophen 325 mG 1 Tablet(s) Oral every 4 hours PRN Moderate Pain (4 - 6)  oxyCODONE    5 mG/acetaminophen 325 mG 2 Tablet(s) Oral every 6 hours PRN Severe Pain (7 - 10)      ----  REVIEW OF SYSTEMS:  CONSTITUTIONAL: denies fever, chills, fatigue, weakness  HEENT: denies blurred vision, sore throat  CARDIOVASCULAR: denies chest pain, chest pressure, palpitations  RESPIRATORY: denies shortness of breath, sputum production  GASTROINTESTINAL: denies nausea, vomiting, diarrhea, abdominal pain  GENITOURINARY: denies dysuria, discharge  NEUROLOGICAL: denies numbness, headache, focal weakness  MUSCULOSKELETAL: denies new joint pain, muscle aches  HEMATOLOGIC: denies gross bleeding, bruising      ----  PHYSICAL EXAM:  General: Well developed, well nourished, NAD  HEENT: NCAT, moist mucous membranes   Neurology: A&Ox3, nonfocal  Respiratory: CTA B/L, No wheezing, rales, or rhonchi  CV: RRR, S1/S2 present, no murmurs  Abdominal: Soft, nontender, non-distended, normoactive bowel sounds  Extremities: no edema, peripheral pulses present, Left foot dressing clean, dry, intact, surgical site c/d/i  MSK: Normal ROM, no joint erythema or warmth, no joint swelling   Skin: warm, dry, normal color    T(C): 37 (05-30-19 @ 07:12), Max: 37.7 (05-29-19 @ 23:26)  HR: 60 (05-30-19 @ 07:12) (57 - 72)  BP: 116/71 (05-30-19 @ 07:12) (106/65 - 117/68)  RR: 16 (05-30-19 @ 07:12) (16 - 21)  SpO2: 93% (05-30-19 @ 07:12) (93% - 99%)  Wt(kg): --    ----  I&O's Summary    29 May 2019 07:01  -  30 May 2019 07:00  --------------------------------------------------------  IN: 1190 mL / OUT: 3300 mL / NET: -2110 mL    30 May 2019 07:01  -  30 May 2019 09:09  --------------------------------------------------------  IN: 600 mL / OUT: 0 mL / NET: 600 mL        LABS:                        7.6    8.37  )-----------( 355      ( 30 May 2019 05:36 )             23.6     05-30    141  |  107  |  15  ----------------------------<  114<H>  4.1   |  29  |  1.40<H>    Ca    8.2<L>      30 May 2019 05:36          CAPILLARY BLOOD GLUCOSE      POCT Blood Glucose.: 132 mg/dL (30 May 2019 07:52)  POCT Blood Glucose.: 253 mg/dL (29 May 2019 21:36)  POCT Blood Glucose.: 159 mg/dL (29 May 2019 16:56)  POCT Blood Glucose.: 201 mg/dL (29 May 2019 12:05)      05-29 @ 01:11   No growth  --  --            ----  Personally reviewed:  Vital sign trends: [x  ] yes    [  ] no     [  ] n/a  Laboratory results: [  x] yes    [  ] no     [  ] n/a  Radiology results: [ x ] yes    [  ] no     [  ] n/a  Culture results: [ x ] yes    [  ] no     [  ] n/a  Consultant recommendations: [ x ] yes    [  ] no     [  ] n/a

## 2019-05-30 NOTE — PROGRESS NOTE ADULT - PROBLEM SELECTOR PLAN 1
first distal metatarsal osteomyelitis noted on MRI. POD2 from L foot hallux amputation and partial ray resection (5/28).  tissue blood and urine cx neg  fu surgical path  podiatry: Hoina  pain control with percocet  bowel regimen  dispo planning

## 2019-05-30 NOTE — PROGRESS NOTE ADULT - PROBLEM SELECTOR PLAN 1
cont lantus 10 units qhs  cont humalog mod dose scale coverage qac  add humalog 3 units 3x/day before meals  cont cons cho diet  goal bg 100-180 in hosp setting

## 2019-05-30 NOTE — PROGRESS NOTE ADULT - PROBLEM SELECTOR PLAN 5
remains resolved at present  ekg/tele nsr, without st or t changes. CE consistent with possible NSTEMI  sublingual nitro prn now   appreciate cardio rec

## 2019-05-31 LAB
ANION GAP SERPL CALC-SCNC: 6 MMOL/L — SIGNIFICANT CHANGE UP (ref 5–17)
BUN SERPL-MCNC: 13 MG/DL — SIGNIFICANT CHANGE UP (ref 7–23)
CALCIUM SERPL-MCNC: 8.3 MG/DL — LOW (ref 8.5–10.1)
CHLORIDE SERPL-SCNC: 107 MMOL/L — SIGNIFICANT CHANGE UP (ref 96–108)
CO2 SERPL-SCNC: 29 MMOL/L — SIGNIFICANT CHANGE UP (ref 22–31)
CREAT SERPL-MCNC: 1.3 MG/DL — SIGNIFICANT CHANGE UP (ref 0.5–1.3)
GLUCOSE SERPL-MCNC: 126 MG/DL — HIGH (ref 70–99)
HCT VFR BLD CALC: 23.4 % — LOW (ref 39–50)
HGB BLD-MCNC: 7.6 G/DL — LOW (ref 13–17)
MCHC RBC-ENTMCNC: 31.9 PG — SIGNIFICANT CHANGE UP (ref 27–34)
MCHC RBC-ENTMCNC: 32.5 GM/DL — SIGNIFICANT CHANGE UP (ref 32–36)
MCV RBC AUTO: 98.3 FL — SIGNIFICANT CHANGE UP (ref 80–100)
NRBC # BLD: 0 /100 WBCS — SIGNIFICANT CHANGE UP (ref 0–0)
PLATELET # BLD AUTO: 355 K/UL — SIGNIFICANT CHANGE UP (ref 150–400)
POTASSIUM SERPL-MCNC: 3.7 MMOL/L — SIGNIFICANT CHANGE UP (ref 3.5–5.3)
POTASSIUM SERPL-SCNC: 3.7 MMOL/L — SIGNIFICANT CHANGE UP (ref 3.5–5.3)
RBC # BLD: 2.38 M/UL — LOW (ref 4.2–5.8)
RBC # FLD: 14 % — SIGNIFICANT CHANGE UP (ref 10.3–14.5)
SODIUM SERPL-SCNC: 142 MMOL/L — SIGNIFICANT CHANGE UP (ref 135–145)
SURGICAL PATHOLOGY STUDY: SIGNIFICANT CHANGE UP
WBC # BLD: 6.94 K/UL — SIGNIFICANT CHANGE UP (ref 3.8–10.5)
WBC # FLD AUTO: 6.94 K/UL — SIGNIFICANT CHANGE UP (ref 3.8–10.5)

## 2019-05-31 PROCEDURE — 99232 SBSQ HOSP IP/OBS MODERATE 35: CPT

## 2019-05-31 RX ORDER — CEFAZOLIN SODIUM 1 G
2000 VIAL (EA) INJECTION EVERY 8 HOURS
Refills: 0 | Status: DISCONTINUED | OUTPATIENT
Start: 2019-05-31 | End: 2019-06-01

## 2019-05-31 RX ORDER — MULTIVIT WITH MIN/MFOLATE/K2 340-15/3 G
1 POWDER (GRAM) ORAL ONCE
Refills: 0 | Status: COMPLETED | OUTPATIENT
Start: 2019-05-31 | End: 2019-05-31

## 2019-05-31 RX ORDER — ONDANSETRON 8 MG/1
4 TABLET, FILM COATED ORAL EVERY 8 HOURS
Refills: 0 | Status: DISCONTINUED | OUTPATIENT
Start: 2019-05-31 | End: 2019-06-05

## 2019-05-31 RX ORDER — MULTIVIT WITH MIN/MFOLATE/K2 340-15/3 G
1 POWDER (GRAM) ORAL ONCE
Refills: 0 | Status: DISCONTINUED | OUTPATIENT
Start: 2019-05-31 | End: 2019-05-31

## 2019-05-31 RX ADMIN — Medication 2: at 17:06

## 2019-05-31 RX ADMIN — Medication 100 MILLIGRAM(S): at 22:12

## 2019-05-31 RX ADMIN — PANTOPRAZOLE SODIUM 40 MILLIGRAM(S): 20 TABLET, DELAYED RELEASE ORAL at 05:31

## 2019-05-31 RX ADMIN — SENNA PLUS 2 TABLET(S): 8.6 TABLET ORAL at 22:13

## 2019-05-31 RX ADMIN — PANTOPRAZOLE SODIUM 40 MILLIGRAM(S): 20 TABLET, DELAYED RELEASE ORAL at 17:06

## 2019-05-31 RX ADMIN — ENOXAPARIN SODIUM 40 MILLIGRAM(S): 100 INJECTION SUBCUTANEOUS at 22:12

## 2019-05-31 RX ADMIN — INSULIN GLARGINE 10 UNIT(S): 100 INJECTION, SOLUTION SUBCUTANEOUS at 22:04

## 2019-05-31 RX ADMIN — POLYETHYLENE GLYCOL 3350 17 GRAM(S): 17 POWDER, FOR SOLUTION ORAL at 22:12

## 2019-05-31 RX ADMIN — Medication 1 BOTTLE: at 18:35

## 2019-05-31 RX ADMIN — Medication 3 UNIT(S): at 07:57

## 2019-05-31 RX ADMIN — Medication 1 PATCH: at 07:51

## 2019-05-31 RX ADMIN — Medication 4: at 22:03

## 2019-05-31 RX ADMIN — Medication 4: at 12:17

## 2019-05-31 RX ADMIN — Medication 100 MILLIGRAM(S): at 12:21

## 2019-05-31 RX ADMIN — Medication 3 UNIT(S): at 17:06

## 2019-05-31 RX ADMIN — Medication 1 PATCH: at 12:21

## 2019-05-31 RX ADMIN — ONDANSETRON 4 MILLIGRAM(S): 8 TABLET, FILM COATED ORAL at 14:09

## 2019-05-31 RX ADMIN — TAMSULOSIN HYDROCHLORIDE 0.8 MILLIGRAM(S): 0.4 CAPSULE ORAL at 22:13

## 2019-05-31 RX ADMIN — Medication 100 MILLIGRAM(S): at 22:28

## 2019-05-31 RX ADMIN — OXYCODONE AND ACETAMINOPHEN 2 TABLET(S): 5; 325 TABLET ORAL at 14:09

## 2019-05-31 RX ADMIN — OXYCODONE AND ACETAMINOPHEN 2 TABLET(S): 5; 325 TABLET ORAL at 22:12

## 2019-05-31 RX ADMIN — Medication 3 UNIT(S): at 12:17

## 2019-05-31 RX ADMIN — Medication 1 PATCH: at 12:17

## 2019-05-31 RX ADMIN — OXYCODONE AND ACETAMINOPHEN 2 TABLET(S): 5; 325 TABLET ORAL at 22:30

## 2019-05-31 RX ADMIN — Medication 100 MILLIGRAM(S): at 05:31

## 2019-05-31 RX ADMIN — Medication 100 MILLIGRAM(S): at 13:53

## 2019-05-31 RX ADMIN — OXYCODONE AND ACETAMINOPHEN 2 TABLET(S): 5; 325 TABLET ORAL at 15:10

## 2019-05-31 RX ADMIN — ONDANSETRON 4 MILLIGRAM(S): 8 TABLET, FILM COATED ORAL at 22:12

## 2019-05-31 RX ADMIN — AMIODARONE HYDROCHLORIDE 200 MILLIGRAM(S): 400 TABLET ORAL at 05:31

## 2019-05-31 NOTE — CONSULT NOTE ADULT - PROBLEM SELECTOR RECOMMENDATION 9
s/p emergent repair  avoid nsaids  tolerating po
change mod dose humalog scale coverage  cont cons cho diet  goal bg 100-180 in hosp setting
Doxy would not be optimum Rx for MSSA and unclear that coag neg staph would be a true pathogen in the context of osteomyelitis. Will change abx to cefazolin 2 grams IV q 8 (adjusted for renal fxn). If margins clear than can stop therapy but if residual osteo then anticipate 8 weeks of IV abx from date of resection so until 7/23

## 2019-05-31 NOTE — PROGRESS NOTE ADULT - PROBLEM SELECTOR PLAN 1
first distal metatarsal osteomyelitis noted on MRI. POD3 from L foot hallux amputation and partial ray resection (5/28).  blood and urine cx neg  tissue cx 1st metatarsal staph and coag neg staph   tissue cx great toe neg  fu surgical path  switch to IV doxy for now and transition to po pending surgical path  ID consult- my for abx choice and duration of treatment, fu rec  podiatry: Hoina  pain control with percocet  bowel regimen first distal metatarsal osteomyelitis noted on MRI. POD3 from L foot hallux amputation and partial ray resection (5/28).  blood and urine cx neg  tissue cx 1st metatarsal staph and coag neg staph   tissue cx great toe neg  fu surgical path  c/w ancef   ID consult- my for abx choice and duration of treatment, fu rec  podiatry: Hoina  pain control with percocet  bowel regimen

## 2019-05-31 NOTE — CONSULT NOTE ADULT - CONSULT REASON
AF w/ RVR
Left medial hallux DFU
af
dm2 uncontrolled
osteomyelitis
peritonitis post op monitoring
med eval

## 2019-05-31 NOTE — CONSULT NOTE ADULT - CONSULT REQUESTED DATE/TIME
13-May-2019 15:15
15-May-2019 05:30
15-May-2019 07:35
21-May-2019 08:34
31-May-2019 11:49
11-May-2019 05:05
20-May-2019 10:29

## 2019-05-31 NOTE — PROGRESS NOTE ADULT - PROBLEM SELECTOR PLAN 1
cont lantus 10 units qhs  cont humalog 3 units 3x/day before meals  cont humalog scale coverage  goal bg 100-180 in hosp setting  cont cons cho diet

## 2019-05-31 NOTE — CONSULT NOTE ADULT - PROBLEM SELECTOR RECOMMENDATION 4
per cardiology/medicine.    Thank you for consulting us and involving us in the management of this most interesting and challenging case.     We will follow along in the care of this patient.

## 2019-05-31 NOTE — CONSULT NOTE ADULT - PROBLEM SELECTOR RECOMMENDATION 3
pt admits to non compliance w insulin  endo eval  importance of long term compliance discussed
good but not tight control

## 2019-05-31 NOTE — CONSULT NOTE ADULT - SUBJECTIVE AND OBJECTIVE BOX
CC:  Patient is a 48y old  Male who presents with a chief complaint of perforated stomach ulcer (11 May 2019 00:30)      HPI/BRIEF HOSPITAL COURSE:   49 yo M with PMH DM (uncontrolled) and HTN, heavy smoker, h/o chronic umbilical hernia, (PMD Dr. Linette Orellana in [place]) p/w pain in epigastric pain on inspiration. Pain started today and is 10/10 severe, and was exacerbated when laying flat. Pt was noted to have an acute gastric ulcer perforation. Pt was evaluated by surgical team and was taken to the OR with Dr. Mejia, s/p omentopexy and plication of the gastric ulcer. Pt was noted to have 1 cm diameter perforated pyloric ulcer with mild chemical peritonitis. Pt was extubated in the recovery room and will be transferred to the ICU for postoperative monitoring.       PAST MEDICAL & SURGICAL HISTORY:  Diabetes mellitus with no complication  No significant past surgical history    Allergies    No Known Allergies    Intolerances      FAMILY HISTORY:      Review of Systems:  CONSTITUTIONAL: No fever, chills, or fatigue  EYES: No eye pain, visual disturbances, or discharge  ENMT:  No difficulty hearing, tinnitus, vertigo; No sinus or throat pain  RESPIRATORY: + cough, pain on inspiration No wheezing, chills or hemoptysis; No shortness of breath  CARDIOVASCULAR: No chest pain, palpitations, dizziness, or leg swelling  GASTROINTESTINAL: +abdominal pain. No nausea, vomiting, or hematemesis; No diarrhea or constipation. No melena or hematochezia.  GENITOURINARY: No dysuria, frequency, hematuria, or incontinence  NEUROLOGICAL: No headaches, memory loss, loss of strength, numbness, or tremors  SKIN: No itching, burning, rashes, or lesions   MUSCULOSKELETAL: No joint pain or swelling; No muscle, back, or extremity pain  PSYCHIATRIC: No depression, anxiety, mood swings, or difficulty sleeping      Medications:  cefepime   IVPB 1000 milliGRAM(s) IV Intermittent every 12 hours  metroNIDAZOLE  IVPB 500 milliGRAM(s) IV Intermittent every 8 hours  acetaminophen  IVPB .. 1000 milliGRAM(s) IV Intermittent once PRN  acetaminophen  Suppository .. 650 milliGRAM(s) Rectal every 6 hours PRN  HYDROmorphone  Injectable 0.5 milliGRAM(s) IV Push every 4 hours PRN  HYDROmorphone  Injectable 1 milliGRAM(s) IV Push every 4 hours PRN  enoxaparin Injectable 40 milliGRAM(s) SubCutaneous every 24 hours  dextrose 40% Gel 15 Gram(s) Oral once PRN  dextrose 50% Injectable 12.5 Gram(s) IV Push once  dextrose 50% Injectable 25 Gram(s) IV Push once  dextrose 50% Injectable 25 Gram(s) IV Push once  glucagon  Injectable 1 milliGRAM(s) IntraMuscular once PRN  insulin lispro (HumaLOG) corrective regimen sliding scale   SubCutaneous every 6 hours  dextrose 5%. 1000 milliLiter(s) IV Continuous <Continuous>  chlorhexidine 2% Cloths 1 Application(s) Topical daily  chlorhexidine 4% Liquid 1 Application(s) Topical <User Schedule>  nicotine -  14 mG/24Hr(s) Patch 1 patch Transdermal daily          ICU Vital Signs Last 24 Hrs  T(C): 36.7 (11 May 2019 03:20), Max: 37.2 (10 May 2019 21:47)  T(F): 98.1 (11 May 2019 03:20), Max: 99 (10 May 2019 21:47)  HR: 81 (11 May 2019 04:00) (76 - 95)  BP: 138/78 (11 May 2019 04:00) (100/66 - 142/75)  BP(mean): 102 (11 May 2019 04:00) (102 - 102)  ABP: --  ABP(mean): --  RR: 26 (11 May 2019 04:00) (17 - 27)  SpO2: 92% (11 May 2019 04:00) (91% - 99%)    Vital Signs Last 24 Hrs  T(C): 36.7 (11 May 2019 03:20), Max: 37.2 (10 May 2019 21:47)  T(F): 98.1 (11 May 2019 03:20), Max: 99 (10 May 2019 21:47)  HR: 81 (11 May 2019 04:00) (76 - 95)  BP: 138/78 (11 May 2019 04:00) (100/66 - 142/75)  BP(mean): 102 (11 May 2019 04:00) (102 - 102)  RR: 26 (11 May 2019 04:00) (17 - 27)  SpO2: 92% (11 May 2019 04:00) (91% - 99%)    ABG - ( 11 May 2019 04:41 )  pH, Arterial: 7.30  pH, Blood: x     /  pCO2: 41    /  pO2: 111   / HCO3: 20    / Base Excess: -5.8  /  SaO2: 98                  I&O's Detail    10 May 2019 07:01  -  11 May 2019 05:06  --------------------------------------------------------  IN:  Total IN: 0 mL    OUT:    Bulb: 30 mL    Indwelling Catheter - Urethral: 100 mL  Total OUT: 130 mL    Total NET: -130 mL            LABS:                        12.4   15.81 )-----------( 301      ( 10 May 2019 22:27 )             37.1     05-10    139  |  109<H>  |  22  ----------------------------<  202<H>  4.4   |  22  |  1.70<H>    Ca    8.9      10 May 2019 22:27    TPro  7.4  /  Alb  3.6  /  TBili  0.5  /  DBili  x   /  AST  14<L>  /  ALT  14  /  AlkPhos  144<H>  05-10          CAPILLARY BLOOD GLUCOSE      POCT Blood Glucose.: 239 mg/dL (11 May 2019 02:57)    PT/INR - ( 10 May 2019 22:27 )   PT: 10.7 sec;   INR: 0.94 ratio         PTT - ( 10 May 2019 22:27 )  PTT:31.4 sec    CULTURES:    cefepime   IVPB 1000 milliGRAM(s) IV Intermittent every 12 hours  metroNIDAZOLE  IVPB 500 milliGRAM(s) IV Intermittent every 8 hours      Physical Examination:  General: No acute distress.  Alert, oriented, interactive, nonfocal  NEURO: A&O X3, motor function 5/5 BL UE/LE  HEENT: Pupils equal, reactive to light.  Symmetric.  PULM: CTA BL, no significant sputum production, no wheezes, rales, rhonchi  CVS: Regular rate and rhythm, no murmurs, rubs, or gallops  ABD: Soft, nondistended, nontender, normoactive bowel sounds, no masses  EXT: No edema, nontender  SKIN: Warm and well perfused, no rashes noted      EKG: NSR     RADIOLOGY: < from: US Gallbladder (05.10.19 @ 23:10) >    IMPRESSION:  No cholelithiasis or cholecystitis.     < from: CT Abdomen and Pelvis No Cont (05.10.19 @ 22:58) >  IMPRESSION:    Small amount of free intraperitoneal air related to a perforated gastric   antral ulcer.  Findings discussed with Dr. Sorenson at 11:35 pm on 5/10/19   with RBV.  Supraumbilical hernia contains fluid and umbilical hernia contains fat.      < end of copied text >      < end of copied text >      CENTRAL LINE: N          DATE INSERTED:                  RODRIGUEZ: Y                     DATE INSERTED:                  A-LINE: N                       DATE INSERTED:                  GLOBAL ISSUE/BEST PRACTICE:  Analgesia: Dilaudid and Ofirmev   Sedation: N/A  HOB elevation: yes  Stress ulcer prophylaxis: Reglan   VTE prophylaxis: Lovenox SQ  Glycemic control: SSI  Nutrition: NPO
Patient is a 48y old  Male who presents with a chief complaint of perforated stomach ulcer (20 May 2019 08:08)  s/p emergent repair  feels well, tolerating po      INTERVAL HPI/OVERNIGHT EVENTS:  T(C): 36.8 (05-20-19 @ 04:52), Max: 37 (05-19-19 @ 20:17)  HR: 57 (05-20-19 @ 07:47) (57 - 61)  BP: 132/82 (05-20-19 @ 07:47) (118/67 - 144/80)  RR: 21 (05-20-19 @ 07:47) (17 - 21)  SpO2: 95% (05-20-19 @ 07:47) (95% - 97%)  Wt(kg): --  I&O's Summary    19 May 2019 07:01  -  20 May 2019 07:00  --------------------------------------------------------  IN: 240 mL / OUT: 2950 mL / NET: -2710 mL    20 May 2019 07:01  -  20 May 2019 10:32  --------------------------------------------------------  IN: 360 mL / OUT: 0 mL / NET: 360 mL        PAST MEDICAL & SURGICAL HISTORY:  Diabetes mellitus with no complication  No significant past surgical history      SOCIAL HISTORY  Alcohol: neg  Tobacco: current smoker  Illicit substance use: denies      FAMILY HISTORY: denies    Home Medications:        LABS:                        10.1   9.24  )-----------( 288      ( 19 May 2019 08:14 )             30.4     05-19    142  |  108  |  21  ----------------------------<  170<H>  3.9   |  26  |  1.30    Ca    8.1<L>      19 May 2019 08:14  Phos  2.9     05-19  Mg     1.8     05-19          CAPILLARY BLOOD GLUCOSE      POCT Blood Glucose.: 173 mg/dL (20 May 2019 07:43)  POCT Blood Glucose.: 271 mg/dL (19 May 2019 21:03)  POCT Blood Glucose.: 161 mg/dL (19 May 2019 16:50)  POCT Blood Glucose.: 215 mg/dL (19 May 2019 12:11)            MEDICATIONS  (STANDING):  amiodarone    Tablet   Oral   amiodarone    Tablet 200 milliGRAM(s) Oral daily  chlorhexidine 2% Cloths 1 Application(s) Topical daily  dextrose 5%. 1000 milliLiter(s) (50 mL/Hr) IV Continuous <Continuous>  dextrose 50% Injectable 12.5 Gram(s) IV Push once  dextrose 50% Injectable 25 Gram(s) IV Push once  dextrose 50% Injectable 25 Gram(s) IV Push once  enoxaparin Injectable 80 milliGRAM(s) SubCutaneous two times a day  insulin lispro (HumaLOG) corrective regimen sliding scale   SubCutaneous Before meals and at bedtime  nicotine -  14 mG/24Hr(s) Patch 1 patch Transdermal daily  pantoprazole    Tablet 40 milliGRAM(s) Oral two times a day  tamsulosin 0.4 milliGRAM(s) Oral at bedtime    MEDICATIONS  (PRN):  acetaminophen   Tablet .. 650 milliGRAM(s) Oral every 6 hours PRN Mild Pain (1 - 3)  dextrose 40% Gel 15 Gram(s) Oral once PRN Blood Glucose LESS THAN 70 milliGRAM(s)/deciliter  glucagon  Injectable 1 milliGRAM(s) IntraMuscular once PRN Glucose LESS THAN 70 milligrams/deciliter  HYDROmorphone  Injectable 0.25 milliGRAM(s) IV Push every 4 hours PRN Severe Pain (7 - 10)  oxyCODONE    IR 5 milliGRAM(s) Oral every 6 hours PRN Moderate Pain (4 - 6)      REVIEW OF SYSTEMS:  CONSTITUTIONAL: No fever, weight loss, or fatigue  EYES: No eye pain, visual disturbances, or discharge  ENMT:  No difficulty hearing, tinnitus, vertigo; No sinus or throat pain  NECK: No pain or stiffness  RESPIRATORY: No cough, wheezing, chills or hemoptysis; No shortness of breath  CARDIOVASCULAR: No chest pain, palpitations, dizziness, or leg swelling  GASTROINTESTINAL: abd pain improving  GENITOURINARY: No dysuria, frequency, hematuria, or incontinence  NEUROLOGICAL: No headaches, memory loss, loss of strength, numbness, or tremors  SKIN: No itching, burning, rashes, or lesions   LYMPH NODES: No enlarged glands  ENDOCRINE: No heat or cold intolerance; No hair loss  MUSCULOSKELETAL: No joint pain or swelling; No muscle, back, or extremity pain  PSYCHIATRIC: No depression, anxiety, mood swings, or difficulty sleeping  HEME/LYMPH: No easy bruising, or bleeding gums  ALLERY AND IMMUNOLOGIC: No hives or eczema    RADIOLOGY & ADDITIONAL TESTS:    Imaging Personally Reviewed:  [ ] YES  [ ] NO    Consultant(s) Notes Reviewed:  [ ] YES  [ ] NO        PHYSICAL EXAM:  GENERAL: NAD, well-groomed, well-developed  HEAD:  Atraumatic, Normocephalic  EYES: EOMI, PERRLA, conjunctiva and sclera clear  ENMT: No tonsillar erythema, exudates, or enlargement; Moist mucous membranes, Good dentition, No lesions  NECK: Supple, No JVD, Normal thyroid  NERVOUS SYSTEM:  Alert & Oriented X3, Good concentration; Motor Strength 5/5 B/L upper and lower extremities; DTRs 2+ intact and symmetric  CHEST/LUNG: Clear to percussion bilaterally; No rales, rhonchi, wheezing, or rubs  HEART: Regular rate and rhythm; No murmurs, rubs, or gallops  ABDOMEN: Soft, Nontender, Nondistended; Bowel sounds present  EXTREMITIES:  2+ Peripheral Pulses, No clubbing, cyanosis, or edema  LYMPH: No lymphadenopathy noted  SKIN: No rashes or lesions    Care Discussed with Consultants/Other Providers [ ] YES  [ ] NO
[facility] - Bolivar Carbajal, El, Brittany, Zahra, Román Jacobs  Office Number: [phone]    Initial Consult Note    CHIEF COMPLAINT: Patient is a 48y old  Male who presents with a chief complaint of perforated stomach ulcer (15 May 2019 05:56)      HPI:  48 year old male PMHx HTN, DM2 (not on meds), and Diabetic Neuropathy.  Presented with Perforated Antral Gastric Ulcer, POD 4 s/p Emergent Exp-lap Oomentopexy and plication.  Recovered in MICU and transferred to surgical  floor yesterday.  This morning with RRT for af with rvr. s/p iv lopressor x 2, iv cardizem and iv amio 150 x 1.  He has no previous history of AF or cardiac issues.  He denies significant chest pain, difficulty breathing and palpitations at time of exam.    PAST MEDICAL & SURGICAL HISTORY:  Diabetes mellitus with no complication  No significant past surgical history      SOCIAL HISTORY:  + smoker, no drug use    FAMILY HISTORY:  + cad in mothers side of family    MEDICATIONS  (STANDING):  chlorhexidine 2% Cloths 1 Application(s) Topical daily  dextrose 5%. 1000 milliLiter(s) (50 mL/Hr) IV Continuous <Continuous>  dextrose 50% Injectable 12.5 Gram(s) IV Push once  dextrose 50% Injectable 25 Gram(s) IV Push once  dextrose 50% Injectable 25 Gram(s) IV Push once  diltiazem Infusion 15 mG/Hr (15 mL/Hr) IV Continuous <Continuous>  diltiazem Injectable 20 milliGRAM(s) IV Push once  enoxaparin Injectable 40 milliGRAM(s) SubCutaneous every 24 hours  insulin lispro (HumaLOG) corrective regimen sliding scale   SubCutaneous every 6 hours  lactated ringers. 1000 milliLiter(s) (75 mL/Hr) IV Continuous <Continuous>  nicotine -  14 mG/24Hr(s) Patch 1 patch Transdermal daily  pantoprazole  Injectable 40 milliGRAM(s) IV Push two times a day  piperacillin/tazobactam IVPB. 3.375 Gram(s) IV Intermittent every 8 hours  potassium chloride  10 mEq/100 mL IVPB 10 milliEquivalent(s) IV Intermittent every 1 hour    MEDICATIONS  (PRN):  acetaminophen  Suppository .. 650 milliGRAM(s) Rectal every 6 hours PRN Temp greater or equal to 38C (100.4F)  dextrose 40% Gel 15 Gram(s) Oral once PRN Blood Glucose LESS THAN 70 milliGRAM(s)/deciliter  glucagon  Injectable 1 milliGRAM(s) IntraMuscular once PRN Glucose LESS THAN 70 milligrams/deciliter  HYDROmorphone  Injectable 0.5 milliGRAM(s) IV Push every 4 hours PRN Moderate Pain (4 - 6)  HYDROmorphone  Injectable 1 milliGRAM(s) IV Push every 4 hours PRN Severe Pain (7 - 10)      Allergies    No Known Allergies    Intolerances        REVIEW OF SYSTEMS:    CONSTITUTIONAL: No weakness, fevers or chills  EYES/ENT: No visual changes;  No vertigo or throat pain   NECK: No pain or stiffness  RESPIRATORY: No cough, wheezing, hemoptysis; No shortness of breath  CARDIOVASCULAR: No chest pain or palpitations  GASTROINTESTINAL: + abdominal pain. No nausea, vomiting, or hematemesis; No diarrhea or constipation. No melena or hematochezia.  GENITOURINARY: No dysuria, frequency or hematuria  NEUROLOGICAL: No numbness or weakness  SKIN: No itching or rash  All other review of systems is negative unless indicated above    VITAL SIGNS:   Vital Signs Last 24 Hrs  T(C): 37.2 (15 May 2019 07:33), Max: 37.7 (14 May 2019 12:00)  T(F): 98.9 (15 May 2019 07:33), Max: 99.8 (14 May 2019 12:00)  HR: 165 (15 May 2019 06:00) (53 - 166)  BP: 120/86 (15 May 2019 06:00) (120/86 - 171/91)  BP(mean): 100 (15 May 2019 06:00) (94 - 111)  RR: 31 (15 May 2019 06:00) (17 - 31)  SpO2: 91% (15 May 2019 06:00) (90% - 100%)    I&O's Summary    14 May 2019 07:01  -  15 May 2019 07:00  --------------------------------------------------------  IN: 750 mL / OUT: 2185 mL / NET: -1435 mL        On Exam:    Constitutional: NAD, alert and oriented x 3, ngt in place  Lungs:  Non-labored, breath sounds are clear bilaterally, No wheezing, rales or rhonchi  Cardiovascular: irregular and tachycardic, S1 and S2 positive.  No murmurs, rubs, gallops or clicks  Gastrointestinal: tender, decrease bs  Lymph: No peripheral edema. No cervical lymphadenopathy.  Neurological: Alert, no focal deficits  Skin: No rashes or ulcers   Psych:  Mood & affect appropriate.    LABS: All Labs Reviewed:                        9.7    8.69  )-----------( 222      ( 15 May 2019 05:36 )             29.7                         9.2    10.05 )-----------( 234      ( 14 May 2019 05:30 )             28.5                         9.5    11.91 )-----------( 233      ( 13 May 2019 04:53 )             30.0     15 May 2019 05:36    148    |  111    |  23     ----------------------------<  132    3.4     |  27     |  1.40   14 May 2019 05:30    147    |  111    |  24     ----------------------------<  132    3.8     |  28     |  1.40   13 May 2019 04:53    144    |  111    |  26     ----------------------------<  141    4.2     |  26     |  1.60     Ca    8.5        15 May 2019 05:36  Ca    8.2        14 May 2019 05:30  Ca    8.1        13 May 2019 04:53  Phos  2.6       15 May 2019 05:36  Phos  2.6       14 May 2019 05:30  Phos  3.0       13 May 2019 04:53  Mg     2.0       15 May 2019 05:36  Mg     2.0       14 May 2019 05:30  Mg     2.3       13 May 2019 04:53    TPro  6.1    /  Alb  2.3    /  TBili  0.3    /  DBili  .20    /  AST  25     /  ALT  18     /  AlkPhos  80     13 May 2019 04:53      CARDIAC MARKERS ( 15 May 2019 05:36 )  .056 ng/mL / x     / 269 U/L / x     / 1.4 ng/mL      Blood Culture: Organism --  Gram Stain Blood -- Gram Stain --  Specimen Source .Urine Catheterized  Culture-Blood --    Organism --  Gram Stain Blood -- Gram Stain   Few polymorphonuclear leukocytes per low power field  Few Squamous epithelial cells per low power field  No organisms seen per oil power field  Specimen Source .Sputum Sputum  Culture-Blood --    Organism --  Gram Stain Blood -- Gram Stain --  Specimen Source .Blood Blood-Peripheral  Culture-Blood --            RADIOLOGY:    EKG: af with rvr
HPI:  48 year old man, admitted May 10th with thehad sudden onset at 7 PM of abdominal pain which was found to be due to a perforated ulcer.  Perforated Antral Gastric Ulcer, s/p Emergent Exp-lap Oomentopexy and plication 5/11. Found to have new onset afib with elevated troponin post-operatively indicative of NSTEMI, started on Amiodarone and converted to sinus rhythm, then with left foot osteo OR 5/28 with amputation 'left foot hallux amputation and partial ray resection'.      PAST MEDICAL & SURGICAL HISTORY:  Diabetes mellitus with no complication  No significant past surgical history      Antimicrobials  doxycycline IVPB      doxycycline IVPB 100 milliGRAM(s) IV Intermittent once  doxycycline IVPB 100 milliGRAM(s) IV Intermittent every 12 hours      Immunological      Other  acetaminophen   Tablet .. 650 milliGRAM(s) Oral every 6 hours PRN  aluminum hydroxide/magnesium hydroxide/simethicone Suspension 30 milliLiter(s) Oral every 4 hours PRN  amiodarone    Tablet 200 milliGRAM(s) Oral daily  dextrose 40% Gel 15 Gram(s) Oral once PRN  dextrose 5%. 1000 milliLiter(s) IV Continuous <Continuous>  dextrose 50% Injectable 12.5 Gram(s) IV Push once  dextrose 50% Injectable 25 Gram(s) IV Push once  docusate sodium 100 milliGRAM(s) Oral three times a day  enoxaparin Injectable 40 milliGRAM(s) SubCutaneous at bedtime  glucagon  Injectable 1 milliGRAM(s) IntraMuscular once PRN  insulin glargine Injectable (LANTUS) 10 Unit(s) SubCutaneous at bedtime  insulin lispro (HumaLOG) corrective regimen sliding scale   SubCutaneous Before meals and at bedtime  insulin lispro Injectable (HumaLOG) 3 Unit(s) SubCutaneous three times a day before meals  lactated ringers. 1000 milliLiter(s) IV Continuous <Continuous>  nicotine -  14 mG/24Hr(s) Patch 1 patch Transdermal daily  nitroglycerin     SubLingual 0.4 milliGRAM(s) SubLingual every 5 minutes PRN  ondansetron Injectable 4 milliGRAM(s) IV Push every 8 hours PRN  oxyCODONE    5 mG/acetaminophen 325 mG 1 Tablet(s) Oral every 4 hours PRN  oxyCODONE    5 mG/acetaminophen 325 mG 2 Tablet(s) Oral every 6 hours PRN  pantoprazole    Tablet 40 milliGRAM(s) Oral two times a day  polyethylene glycol 3350 17 Gram(s) Oral at bedtime  senna 2 Tablet(s) Oral at bedtime  tamsulosin 0.8 milliGRAM(s) Oral at bedtime      Allergies    No Known Allergies    Intolerances    SOCIAL HISTORY: no toxic habits reported    FAMILY HISTORY: noncontrib      ROS:    EYES:  Negative  blurry vision or double vision  GASTROINTESTINAL:  Negative for nausea, vomiting, diarrhea  -otherwise negative except for subjective    Vital Signs Last 24 Hrs  T(C): 36.9 (31 May 2019 11:34), Max: 37.1 (31 May 2019 00:20)  T(F): 98.4 (31 May 2019 11:34), Max: 98.7 (31 May 2019 00:20)  HR: 61 (31 May 2019 11:35) (56 - 62)  BP: 104/63 (31 May 2019 11:35) (100/62 - 144/61)  BP(mean): --  RR: 20 (31 May 2019 11:34) (17 - 20)  SpO2: 95% (31 May 2019 11:34) (92% - 97%)    PE:  WDWN in no distress  HEENT:  NC, PERRL, sclerae anicteric, conjunctivae clear, EOMI.  Sinuses nontender, no nasal exudate.  No buccal or pharyngeal lesions, erythema or exudate  Neck:  Supple, no adenopathy  Lungs:  No accessory muscle use, breathing comfortably  Cor:  RRR  Abd:  Symmetric, normoactive BS.  Soft, nontender, no masses, guarding or rebound.  Liver and spleen not enlarged  Extrem:  No cyanosis or edema  Skin:  No rashes.  Neuro: grossly intact  Musc: moving all limbs freely, no focal deficits    LABS:                        7.6    6.94  )-----------( 355      ( 31 May 2019 07:34 )             23.4       WBC Count: 6.94 K/uL (05-31-19 @ 07:34)  WBC Count: 8.37 K/uL (05-30-19 @ 05:36)  WBC Count: 10.18 K/uL (05-29-19 @ 06:09)  WBC Count: 7.04 K/uL (05-28-19 @ 05:44)  WBC Count: 7.80 K/uL (05-27-19 @ 06:54)  WBC Count: 7.83 K/uL (05-26-19 @ 12:03)  WBC Count: 7.62 K/uL (05-26-19 @ 08:22)      05-31    142  |  107  |  13  ----------------------------<  126<H>  3.7   |  29  |  1.30    Ca    8.3<L>      31 May 2019 07:34        Creatinine, Serum: 1.30 mg/dL (05-31-19 @ 07:34)  Creatinine, Serum: 1.40 mg/dL (05-30-19 @ 05:36)  Creatinine, Serum: 1.40 mg/dL (05-29-19 @ 06:09)  Creatinine, Serum: 1.20 mg/dL (05-28-19 @ 05:44)  Creatinine, Serum: 1.30 mg/dL (05-27-19 @ 06:54)  Creatinine, Serum: 1.40 mg/dL (05-26-19 @ 08:22)    MICROBIOLOGY:    Culture - Tissue with Gram Stain (05.29.19 @ 01:11)    Gram Stain:   Rare polymorphonuclear leukocytes seen per low power field  No organisms seen per oil power field    -  Ampicillin/Sulbactam: S <=8/4    -  Ampicillin/Sulbactam: R <=8/4    -  Cefazolin: S <=4    -  Cefazolin: R <=4    -  Clindamycin: S 0.5    -  Clindamycin: S <=0.25    -  Erythromycin: S <=0.25    -  Erythromycin: S <=0.25    -  Gentamicin: S <=1 Should not be used as monotherapy    -  Gentamicin: S <=1 Should not be used as monotherapy    -  Oxacillin: S 0.5    -  Oxacillin: R 0.5    -  Penicillin: R >8    -  Penicillin: R >8    -  RIF- Rifampin: S <=1 Should not be used as monotherapy    -  RIF- Rifampin: S <=1 Should not be used as monotherapy    -  Tetra/Doxy: S <=1    -  Tetra/Doxy: S <=1    -  Trimethoprim/Sulfamethoxazole: S <=0.5/9.5    -  Trimethoprim/Sulfamethoxazole: S <=0.5/9.5    -  Vancomycin: S 2    -  Vancomycin: S 2    Specimen Source: .Tissue Other, bone left 1st metatarsal head    Culture Results:   Rare Staphylococcus aureus  Few Coag Negative Staphylococcus    Organism Identification: Staphylococcus aureus  Coag Negative Staphylococcus    Organism: Staphylococcus aureus    Organism: Coag Negative Staphylococcus    Method Type: BAYLEE    Method Type: BAYLEE        RADIOLOGY & ADDITIONAL STUDIES:    --
Patient is a 48y old  Male who presents with a chief complaint of perforated stomach ulcer (13 May 2019 13:25) seen by podiatry for left foot medial 1st IPJ joint G2 DFU     HPI:  48 year old man, had sudden onset at 7 PM of abdominal pain seen at bedside during podiatry wounds for left foot medial hallux G2 DFU. Patient resting in bed conformably in NAD. Patient states he has seen a podiatrist in the past but when he lost his insurance he no longer followed a podiatrist. Patient states he has had the wound for several weeks. Patient denies local wound care to the foot. Patient denies N/F/V/SOB/CP or calf pain at this time.     Vital Signs Last 24 Hrs  T(C): 37.4 (13 May 2019 12:00), Max: 38.6 (12 May 2019 21:00)  T(F): 99.3 (13 May 2019 12:00), Max: 101.4 (12 May 2019 21:00)  HR: 68 (13 May 2019 15:00) (68 - 92)  BP: 137/71 (13 May 2019 15:00) (117/61 - 176/79)  BP(mean): 97 (13 May 2019 15:00) (84 - 113)  RR: 16 (13 May 2019 15:00) (14 - 32)  SpO2: 98% (13 May 2019 15:00) (93% - 99%)                          9.5    11.91 )-----------( 233      ( 13 May 2019 04:53 )             30.0   05-13    144  |  111<H>  |  26<H>  ----------------------------<  141<H>  4.2   |  26  |  1.60<H>    Ca    8.1<L>      13 May 2019 04:53  Phos  3.0     05-13  Mg     2.3     05-13    TPro  6.1  /  Alb  2.3<L>  /  TBili  0.3  /  DBili  .20  /  AST  25  /  ALT  18  /  AlkPhos  80  05-13    PAST MEDICAL & SURGICAL HISTORY:  Diabetes mellitus with no complication  No significant past surgical history    MEDICATIONS  (STANDING):  chlorhexidine 2% Cloths 1 Application(s) Topical daily  dextrose 5%. 1000 milliLiter(s) (50 mL/Hr) IV Continuous <Continuous>  dextrose 50% Injectable 12.5 Gram(s) IV Push once  dextrose 50% Injectable 25 Gram(s) IV Push once  dextrose 50% Injectable 25 Gram(s) IV Push once  enoxaparin Injectable 40 milliGRAM(s) SubCutaneous every 24 hours  insulin lispro (HumaLOG) corrective regimen sliding scale   SubCutaneous every 6 hours  lactated ringers. 1000 milliLiter(s) (100 mL/Hr) IV Continuous <Continuous>  nicotine -  14 mG/24Hr(s) Patch 1 patch Transdermal daily  pantoprazole  Injectable 40 milliGRAM(s) IV Push two times a day  piperacillin/tazobactam IVPB. 3.375 Gram(s) IV Intermittent every 8 hours    MEDICATIONS  (PRN):  acetaminophen  Suppository .. 650 milliGRAM(s) Rectal every 6 hours PRN Temp greater or equal to 38C (100.4F)  dextrose 40% Gel 15 Gram(s) Oral once PRN Blood Glucose LESS THAN 70 milliGRAM(s)/deciliter  glucagon  Injectable 1 milliGRAM(s) IntraMuscular once PRN Glucose LESS THAN 70 milligrams/deciliter  HYDROmorphone  Injectable 0.5 milliGRAM(s) IV Push every 4 hours PRN Moderate Pain (4 - 6)  HYDROmorphone  Injectable 1 milliGRAM(s) IV Push every 4 hours PRN Severe Pain (7 - 10)    Objective   Vascular: DP/PT palpable, CFT<3, Temp gradient warm to cool pedal hair absent, no edema present at this time   Derm: Left great toe medial IPJ DFU down to subcutaneous fat, no prob to bone, periwound erythema noted measures no maceration, no drainage, no mal odor, no tracking no tunneling probe to subcutaneous fat   Musc: hammer toe deformity 2-5 with overlapping of digits, pes planus,   Nuero: protective sensation absent
Patient is a 48y old  Male who presents with a chief complaint of perforated stomach ulcer (14 May 2019 10:53)      BRIEF HOSPITAL COURSE: 48 year old male PMHx HTN, DM2 (not on meds), and Diabetic Neuropathy.  Presented with Perforated Antral Gastric Ulcer, POD 4 s/p Emergent Exp-lap Oomentopexy and plication.  Recovered in MICU and transferred to surgical  floor yesterday.    Events last 24 hours: Tonight with AF w/ RVR -160's, maintaining BP, No complaints on Exam.      PAST MEDICAL & SURGICAL HISTORY:  Diabetes mellitus with no complication  No significant past surgical history      Review of Systems:  CONSTITUTIONAL: No fever, chills, or fatigue  EYES: No eye pain, visual disturbances, or discharge  ENMT:  No difficulty hearing, tinnitus, vertigo; No sinus or throat pain  NECK: No pain or stiffness  RESPIRATORY: No cough, wheezing, chills or hemoptysis; No shortness of breath  CARDIOVASCULAR: No chest pain, palpitations, dizziness, or leg swelling  GASTROINTESTINAL: No abdominal or epigastric pain. No nausea, vomiting, or hematemesis; No diarrhea or constipation. No melena or hematochezia.  GENITOURINARY: No dysuria, frequency, hematuria, or incontinence  NEUROLOGICAL: No headaches, memory loss, loss of strength, numbness, or tremors  SKIN: No itching, burning, rashes, or lesions   MUSCULOSKELETAL: No joint pain or swelling; No muscle, back, or extremity pain  PSYCHIATRIC: No depression, anxiety, mood swings, or difficulty sleeping      Medications:  piperacillin/tazobactam IVPB. 3.375 Gram(s) IV Intermittent every 8 hours  amiodarone IVPB 150 milliGRAM(s) IV Intermittent once  diltiazem Injectable 20 milliGRAM(s) IV Push once  metoprolol tartrate Injectable 5 milliGRAM(s) IV Push once  metoprolol tartrate Injectable 5 milliGRAM(s) IV Push once  acetaminophen  Suppository .. 650 milliGRAM(s) Rectal every 6 hours PRN  HYDROmorphone  Injectable 0.5 milliGRAM(s) IV Push every 4 hours PRN  HYDROmorphone  Injectable 1 milliGRAM(s) IV Push every 4 hours PRN  enoxaparin Injectable 40 milliGRAM(s) SubCutaneous every 24 hours  pantoprazole  Injectable 40 milliGRAM(s) IV Push two times a day  dextrose 40% Gel 15 Gram(s) Oral once PRN  dextrose 50% Injectable 12.5 Gram(s) IV Push once  dextrose 50% Injectable 25 Gram(s) IV Push once  dextrose 50% Injectable 25 Gram(s) IV Push once  glucagon  Injectable 1 milliGRAM(s) IntraMuscular once PRN  insulin lispro (HumaLOG) corrective regimen sliding scale   SubCutaneous every 6 hours  dextrose 5%. 1000 milliLiter(s) IV Continuous <Continuous>  lactated ringers Bolus 250 milliLiter(s) IV Bolus once  lactated ringers Bolus 250 milliLiter(s) IV Bolus once  lactated ringers. 1000 milliLiter(s) IV Continuous <Continuous>  chlorhexidine 2% Cloths 1 Application(s) Topical daily  nicotine -  14 mG/24Hr(s) Patch 1 patch Transdermal daily        ICU Vital Signs Last 24 Hrs  T(C): 36.8 (15 May 2019 05:09), Max: 37.7 (14 May 2019 12:00)  T(F): 98.2 (15 May 2019 05:09), Max: 99.8 (14 May 2019 12:00)  HR: 140 (15 May 2019 05:09) (53 - 140)  BP: 169/68 (15 May 2019 05:09) (143/65 - 171/91)  BP(mean): 104 (14 May 2019 16:00) (94 - 111)  RR: 17 (15 May 2019 05:09) (16 - 24)  SpO2: 94% (15 May 2019 05:09) (90% - 100%)          I&O's Detail    13 May 2019 07:01  -  14 May 2019 07:00  --------------------------------------------------------  IN:    lactated ringers.: 2050 mL    Solution: 300 mL  Total IN: 2350 mL    OUT:    Bulb: 60 mL    Indwelling Catheter - Urethral: 2870 mL    Nasoenteral Tube: 700 mL  Total OUT: 3630 mL    Total NET: -1280 mL      14 May 2019 07:01  -  15 May 2019 05:57  --------------------------------------------------------  IN:    lactated ringers.: 675 mL    Solution: 75 mL  Total IN: 750 mL    OUT:    Bulb: 40 mL    Indwelling Catheter - Urethral: 1795 mL    Nasoenteral Tube: 350 mL  Total OUT: 2185 mL    Total NET: -1435 mL            LABS:                        9.7    8.69  )-----------( 222      ( 15 May 2019 05:36 )             29.7     05-15    148<H>  |  111<H>  |  23  ----------------------------<  132<H>  3.4<L>   |  27  |  1.40<H>    Ca    8.5      15 May 2019 05:36  Phos  2.6     05-15  Mg     2.0     05-15            CAPILLARY BLOOD GLUCOSE      POCT Blood Glucose.: 126 mg/dL (15 May 2019 05:19)        CULTURES:  Culture Results:   No growth (05-13 @ 09:26)  Culture Results:   Normal Respiratory Sophia present (05-13 @ 01:10)  Culture Results:   No growth to date. (05-11 @ 12:13)  Culture Results:   No growth to date. (05-11 @ 12:13)      Physical Examination:    General:  Awake.  Alert, oriented, interactive, nonfocal    HEENT: Pupils equal, reactive to light.  Symmetric. No JVD.  NGT with Bilious O/P    PULM: Clear to auscultation bilaterally though diminished at bases, no significant sputum production    CVS: S1, S2 IRR / Tachy,  no murmurs, rubs, or gallops    ABD: Dressing C/D/I JO with scant sero-sang, No BS, mild diffusely tender, normoactive bowel sounds, no masses    EXT: R UR Forearm edema, No LE edema, nontender, L Halux ulcer     SKIN: Warm and well perfused, no rashes noted.        RADIOLOGY: No New     CRITICAL CARE TIME SPENT: 50 minutes   (Assessing presenting problems of acute illness, which pose high probability of life threatening deterioration or end organ damage/dysfunction, as well as medical decision making including initiating plan of care, reviewing data, reviewing radiologic exams, discussing with multidisciplinary team,  discussing goals of care with patient/family, and writing this note.  Non-inclusive of procedures performed)
Patient is a 48y old  Male who presents with a chief complaint of perforated stomach ulcer (20 May 2019 11:00)      Reason For Consult: dm2 uncontrolled    HPI:  48 year old man, had sudden onset at 7 PM of abdominal pain (11 May 2019 00:30)      PAST MEDICAL & SURGICAL HISTORY:  Diabetes mellitus with no complication  No significant past surgical history      FAMILY HISTORY:        Social History:    MEDICATIONS  (STANDING):  amiodarone    Tablet   Oral   amiodarone    Tablet 200 milliGRAM(s) Oral daily  chlorhexidine 2% Cloths 1 Application(s) Topical daily  dextrose 5%. 1000 milliLiter(s) (50 mL/Hr) IV Continuous <Continuous>  dextrose 50% Injectable 12.5 Gram(s) IV Push once  dextrose 50% Injectable 25 Gram(s) IV Push once  dextrose 50% Injectable 25 Gram(s) IV Push once  enoxaparin Injectable 80 milliGRAM(s) SubCutaneous two times a day  insulin lispro (HumaLOG) corrective regimen sliding scale   SubCutaneous Before meals and at bedtime  nicotine -  14 mG/24Hr(s) Patch 1 patch Transdermal daily  pantoprazole    Tablet 40 milliGRAM(s) Oral two times a day  tamsulosin 0.8 milliGRAM(s) Oral at bedtime    MEDICATIONS  (PRN):  acetaminophen   Tablet .. 650 milliGRAM(s) Oral every 6 hours PRN Mild Pain (1 - 3)  dextrose 40% Gel 15 Gram(s) Oral once PRN Blood Glucose LESS THAN 70 milliGRAM(s)/deciliter  glucagon  Injectable 1 milliGRAM(s) IntraMuscular once PRN Glucose LESS THAN 70 milligrams/deciliter  HYDROmorphone  Injectable 0.25 milliGRAM(s) IV Push every 4 hours PRN Severe Pain (7 - 10)  oxyCODONE    IR 5 milliGRAM(s) Oral every 6 hours PRN Moderate Pain (4 - 6)        T(C): 36.7 (05-21-19 @ 04:12), Max: 37.4 (05-20-19 @ 12:04)  HR: 63 (05-21-19 @ 04:12) (63 - 87)  BP: 154/78 (05-21-19 @ 04:12) (120/73 - 154/78)  RR: 18 (05-21-19 @ 04:12) (18 - 20)  SpO2: 95% (05-21-19 @ 04:12) (94% - 97%)  Wt(kg): --    PHYSICAL EXAM:  GENERAL: NAD, well-groomed, well-developed  HEAD:  Atraumatic, Normocephalic  NECK: Supple, No JVD, Normal thyroid  CHEST/LUNG: Clear to percussion bilaterally; No rales, rhonchi, wheezing, or rubs  HEART: Regular rate and rhythm; No murmurs, rubs, or gallops  ABDOMEN: Soft, Nontender, Nondistended; Bowel sounds present  EXTREMITIES:  2+ Peripheral Pulses, No clubbing, cyanosis, or edema  SKIN: No rashes or lesions    CAPILLARY BLOOD GLUCOSE      POCT Blood Glucose.: 185 mg/dL (21 May 2019 07:50)  POCT Blood Glucose.: 251 mg/dL (20 May 2019 21:47)  POCT Blood Glucose.: 209 mg/dL (20 May 2019 16:55)  POCT Blood Glucose.: 179 mg/dL (20 May 2019 11:35)          CMP:      Thyroid Function Tests:      Diabetes Tests:       Radiology:

## 2019-05-31 NOTE — PROGRESS NOTE ADULT - SUBJECTIVE AND OBJECTIVE BOX
Patient is a 48y old  Male who presents with a chief complaint of perforated stomach ulcer (31 May 2019 08:21)      FROM ADMISSION H+P:   HPI:  48 year old man, had sudden onset at 7 PM of abdominal pain (11 May 2019 00:30)      ----  INTERVAL HPI/OVERNIGHT EVENTS: Pt seen and evaluated at the bedside. No acute overnight events occurred. pt reports improved foot pain. endorses nausea with pain meds, but states zofran helps. PT: ELIAS    ----  PAST MEDICAL & SURGICAL HISTORY:  Diabetes mellitus with no complication  No significant past surgical history      FAMILY HISTORY:      ----  MEDICATIONS  (STANDING):  amiodarone    Tablet 200 milliGRAM(s) Oral daily  ceFAZolin   IVPB 2000 milliGRAM(s) IV Intermittent every 8 hours  dextrose 5%. 1000 milliLiter(s) (50 mL/Hr) IV Continuous <Continuous>  dextrose 50% Injectable 12.5 Gram(s) IV Push once  dextrose 50% Injectable 25 Gram(s) IV Push once  docusate sodium 100 milliGRAM(s) Oral three times a day  enoxaparin Injectable 40 milliGRAM(s) SubCutaneous at bedtime  insulin glargine Injectable (LANTUS) 10 Unit(s) SubCutaneous at bedtime  insulin lispro (HumaLOG) corrective regimen sliding scale   SubCutaneous Before meals and at bedtime  insulin lispro Injectable (HumaLOG) 3 Unit(s) SubCutaneous three times a day before meals  lactated ringers. 1000 milliLiter(s) (75 mL/Hr) IV Continuous <Continuous>  nicotine -  14 mG/24Hr(s) Patch 1 patch Transdermal daily  pantoprazole    Tablet 40 milliGRAM(s) Oral two times a day  polyethylene glycol 3350 17 Gram(s) Oral at bedtime  senna 2 Tablet(s) Oral at bedtime  tamsulosin 0.8 milliGRAM(s) Oral at bedtime    MEDICATIONS  (PRN):  acetaminophen   Tablet .. 650 milliGRAM(s) Oral every 6 hours PRN Mild Pain (1 - 3)  aluminum hydroxide/magnesium hydroxide/simethicone Suspension 30 milliLiter(s) Oral every 4 hours PRN Dyspepsia  dextrose 40% Gel 15 Gram(s) Oral once PRN Blood Glucose LESS THAN 70 milliGRAM(s)/deciliter  glucagon  Injectable 1 milliGRAM(s) IntraMuscular once PRN Glucose LESS THAN 70 milligrams/deciliter  nitroglycerin     SubLingual 0.4 milliGRAM(s) SubLingual every 5 minutes PRN Chest Pain  ondansetron Injectable 4 milliGRAM(s) IV Push every 8 hours PRN Nausea and/or Vomiting  oxyCODONE    5 mG/acetaminophen 325 mG 1 Tablet(s) Oral every 4 hours PRN Moderate Pain (4 - 6)  oxyCODONE    5 mG/acetaminophen 325 mG 2 Tablet(s) Oral every 6 hours PRN Severe Pain (7 - 10)      ----      T(C): 36.9 (05-31-19 @ 07:35), Max: 37.1 (05-30-19 @ 11:25)  HR: 56 (05-31-19 @ 07:35) (56 - 64)  BP: 126/72 (05-31-19 @ 07:35) (104/63 - 144/61)  RR: 19 (05-31-19 @ 07:35) (17 - 19)  SpO2: 92% (05-31-19 @ 07:35) (92% - 97%)  Wt(kg): --    ----  I&O's Summary    30 May 2019 07:01  -  31 May 2019 07:00  --------------------------------------------------------  IN: 1910 mL / OUT: 5225 mL / NET: -3315 mL      REVIEW OF SYSTEMS:  CONSTITUTIONAL: denies fever, chills, fatigue, weakness  HEENT: denies blurred vision, sore throat  CARDIOVASCULAR: denies chest pain, chest pressure, palpitations  RESPIRATORY: denies shortness of breath, sputum production  GASTROINTESTINAL: denies nausea, vomiting, diarrhea, abdominal pain  GENITOURINARY: denies dysuria, discharge  NEUROLOGICAL: denies numbness, headache, focal weakness  MUSCULOSKELETAL: denies new joint pain, muscle aches  HEMATOLOGIC: denies gross bleeding, bruising      ----  PHYSICAL EXAM:  General: Well developed, well nourished, NAD  HEENT: NCAT, moist mucous membranes   Neurology: A&Ox3, nonfocal  Respiratory: CTA B/L, No wheezing, rales, or rhonchi  CV: RRR, S1/S2 present, no murmurs  Abdominal: Soft, nontender, non-distended, normoactive bowel sounds  Extremities: +1-2 edema b/l, peripheral pulses present, Left foot dressing clean, dry, intact, surgical site c/d/i  MSK: Normal ROM, no joint erythema or warmth, no joint swelling   Skin: warm, dry, normal color    LABS:                        7.6    6.94  )-----------( 355      ( 31 May 2019 07:34 )             23.4     05-31    142  |  107  |  13  ----------------------------<  126<H>  3.7   |  29  |  1.30    Ca    8.3<L>      31 May 2019 07:34          CAPILLARY BLOOD GLUCOSE      POCT Blood Glucose.: 125 mg/dL (31 May 2019 07:44)  POCT Blood Glucose.: 238 mg/dL (30 May 2019 21:25)  POCT Blood Glucose.: 189 mg/dL (30 May 2019 17:08)  POCT Blood Glucose.: 219 mg/dL (30 May 2019 11:50)      05-29 @ 01:11   No growth  --  Staphylococcus aureus            ----  Personally reviewed:  Vital sign trends: [ x ] yes    [  ] no     [  ] n/a  Laboratory results: [ x ] yes    [  ] no     [  ] n/a  Radiology results: [ x ] yes    [  ] no     [  ] n/a  Culture results: [ x ] yes    [  ] no     [  ] n/a  Consultant recommendations: [ x ] yes    [  ] no     [  ] n/a

## 2019-05-31 NOTE — PROGRESS NOTE ADULT - ASSESSMENT
48 year old male with HTN and DM2, who presented with Perforated Antral Gastric Ulcer, POD 5 s/p Emergent Exp-lap Oomentopexy and plication. He had an RRT for atrial fibrillation with rapid ventricular response, which is new. He has had difficult to control periods of tachycardia and bradycardia, currently in a sinus rhythm this morning   He had an elevated troponin possibly suggestive of non-ST segment elevation myocardial infarction/demand ischemia. He has no known history of coronary artery disease    Afib  - Remains in SR clinically.  Currently off monitor  - Continue Amio, plan is to stop Amio as an outpatient and consider BB once off  - TTE with normal LV function, moderate MR   -  no need for long term AC as Afib was short lived   - H/H remain stable.  - Maintain  K>4, Mg>2  - If no recurrence of Afib while inpatient, we recommend an event monitor as outpatient.    Anemia  - H&H noted  - As per primary team  - Off full dose AC    S/P omentopexy and plication of gastric ulcer   - Tolerated procedure well, however, had Afib postop, spontaneously to NSR  - Pain control    - MRI with left foot osteomyelitis and now S/P  left hallux resection with debridement tolerated procedure w/o cardiac compromise   - All other workup per Primary. Will continue to follow     Ed Paula [facility]  Cardiology

## 2019-05-31 NOTE — PROGRESS NOTE ADULT - ATTENDING COMMENTS
Patient seen and evaluated  OR Cx and Path reviewed    Wound cleaned with NS and dressed with DSD   Patient to continue abx based on ID recommendation     Patient is podiatrically stable for discharge   Patient to ambulate with surgical shoe and partial weight bearing to heel   Patient to continue abx based on ID recommendations   Patient will keep dressings clean dry and intact until follow up in one week with Dr Hale.

## 2019-05-31 NOTE — PROGRESS NOTE ADULT - SUBJECTIVE AND OBJECTIVE BOX
Patient is a 48y old  Male who presents with a chief complaint of perforated stomach ulcer (13 May 2019 13:25) seen by podiatry for left foot medial 1st IPJ joint G2 DFU     HPI:  48 year old man, seen at bedside during podiatry rounds for left foot medial hallux G2 DFU. Patient resting in bed conformably in NAD. Patient denies N/F/V/SOB/CP or calf pain at this time. Patient is s/p left foot hallux and 1st met amputation Patient denies any further pedal complaints at this time.    ICU Vital Signs Last 24 Hrs  T(C): 36.9 (31 May 2019 11:34), Max: 37.1 (31 May 2019 00:20)  T(F): 98.4 (31 May 2019 11:34), Max: 98.7 (31 May 2019 00:20)  HR: 95 (31 May 2019 11:59) (56 - 95)  BP: 104/63 (31 May 2019 11:35) (100/62 - 144/61)  BP(mean): --  ABP: --  ABP(mean): --  RR: 20 (31 May 2019 11:34) (17 - 20)  SpO2: 95% (31 May 2019 11:59) (92% - 97%)    05-31    142  |  107  |  13  ----------------------------<  126<H>  3.7   |  29  |  1.30    Ca    8.3<L>      31 May 2019 07:34                          7.6    6.94  )-----------( 355      ( 31 May 2019 07:34 )             23.4   MEDICATIONS  (STANDING):  amiodarone    Tablet 200 milliGRAM(s) Oral daily  ceFAZolin   IVPB 2000 milliGRAM(s) IV Intermittent every 8 hours  dextrose 5%. 1000 milliLiter(s) (50 mL/Hr) IV Continuous <Continuous>  dextrose 50% Injectable 12.5 Gram(s) IV Push once  dextrose 50% Injectable 25 Gram(s) IV Push once  docusate sodium 100 milliGRAM(s) Oral three times a day  enoxaparin Injectable 40 milliGRAM(s) SubCutaneous at bedtime  insulin glargine Injectable (LANTUS) 10 Unit(s) SubCutaneous at bedtime  insulin lispro (HumaLOG) corrective regimen sliding scale   SubCutaneous Before meals and at bedtime  insulin lispro Injectable (HumaLOG) 3 Unit(s) SubCutaneous three times a day before meals  lactated ringers. 1000 milliLiter(s) (75 mL/Hr) IV Continuous <Continuous>  nicotine -  14 mG/24Hr(s) Patch 1 patch Transdermal daily  pantoprazole    Tablet 40 milliGRAM(s) Oral two times a day  polyethylene glycol 3350 17 Gram(s) Oral at bedtime  senna 2 Tablet(s) Oral at bedtime  tamsulosin 0.8 milliGRAM(s) Oral at bedtime    MEDICATIONS  (PRN):  acetaminophen   Tablet .. 650 milliGRAM(s) Oral every 6 hours PRN Mild Pain (1 - 3)  aluminum hydroxide/magnesium hydroxide/simethicone Suspension 30 milliLiter(s) Oral every 4 hours PRN Dyspepsia  dextrose 40% Gel 15 Gram(s) Oral once PRN Blood Glucose LESS THAN 70 milliGRAM(s)/deciliter  glucagon  Injectable 1 milliGRAM(s) IntraMuscular once PRN Glucose LESS THAN 70 milligrams/deciliter  nitroglycerin     SubLingual 0.4 milliGRAM(s) SubLingual every 5 minutes PRN Chest Pain  ondansetron Injectable 4 milliGRAM(s) IV Push every 8 hours PRN Nausea and/or Vomiting  oxyCODONE    5 mG/acetaminophen 325 mG 1 Tablet(s) Oral every 4 hours PRN Moderate Pain (4 - 6)  oxyCODONE    5 mG/acetaminophen 325 mG 2 Tablet(s) Oral every 6 hours PRN Severe Pain (7 - 10)      ACCESSION No:  30 [id number]    SARAH MORRISON                       2        Surgical Final Report          Final Diagnosis  1: Left great toe:  Epidermal inclusion cyst of skin.  No evidence of acute osteomyelitis of bone.  Viable skin margin and viable bone margin.    2. Left 1st metatarsal head:  Portion of bone with attached fibrous tissue.  No evidence of acute osteomyelitis.    3. Left 1st metatarsal bone clean proximal margin:  Portion of bone with attached fibrous tissue.  No evidence of acute osteomyelitis.    4. Diabetic ulcer left foot:  Benign skin with ulceration, acute inflammation, and reactive  change.  The acute inflammation involves peripheral skin margin.    Verified by: Amira Villeda MD  (Electronic Signature)  Reported on: 05/31/19 12:16 EDT, [address] [place] [id number]  _________________________________________________________________    Clinical History  Procedure: Amputation left great toe    Specimen(s) Submitted  1-Left great toe  2-Left 1st metatarsal head  3-Left 1st metatarsal bone clean proximal margin  4-Diabetic ulcer left foot    Gross Description  The specimen is received in formalin and the specimen container  is labeled: Left great toe. It consists of an amputated digit  with smooth skin and bone margin of resection measuring 6.6 x 2.5  x 2.5 cm. The nail is present and shows tan-brown discoloration.  There is a tan-brown non-viable, friable appearing surfaced area  measuring overall 2.0 x 1.0, located 0.1 cm from the skin margin,  and involving the dorsal and medial surfaces. The plantar surface  shows a pinpoint depressed area measuring 0.1 cm in diameter. The  specimen is representatively submitted following selective  decalcification. Four cassettes.  A - skin margin and non-viable appearing area  B - bone margin  C - pinpoint depressed area            SARAH MORRISON                       2        Surgical Final Report          D - bone overlying depressed area    2. The specimen is received in formalin and the specimen  container is labeled: Left 1st metatarsal head bone. It consists  of two pieces of tan-brown bony tissue with attached gray-tan  soft tissue measuring in aggregate 4.0 x 3.0 x 1.7 cm. The bone  cut surface is yellow and spongy. Representative sections  following decalcification. Two cassettes.    3. The specimen is received in formalin and the specimen  container is labeled: Left 1st metatarsal bone clean proximal  margin. It consists of an ovoid piece of tan-brown bony tissue  with a smooth clean surgical cut measuring 2.0 x 1.7 x 0.3 cm.  Entirely submitted following decalcification. One cassette.    4. The specimen is received in formalin and the specimen  container is labeled: Diabetic ulcer left foot. It consists of an  ovoid portion of white-tan skin measuring 3.0 x 1.7 x 0.7 cm. The  skin surface shows a tan-brown ulcerated lesion measuring 2.0 x  1.7 cm, abutting the skin margin. The specimen is sectioned and  representatively submitted. One cassette.    In addition to other data that may appear on the specimen  containers, all labels have been inspected to confirm the  presence of the patient's name and date of birth.  DN 5/31/2019 8:10 AM    In addition to other data that may appear on the specimen  containers, all labels have been inspected to confirm the  presence of the patient's name and date of birth.  DN 5/31/2019 8:01 AM    Perioperative Diagnosis  Osteomyelitis left foot    Postoperative Diagnosis  Same      Objective   Vascular: DP/PT palpable, CFT<3, Temp gradient warm to cool pedal hair absent, no edema present at this time   Derm:left foot sutures intact no dehiscence post surgical erythema no edema   Musc: hammer toe deformity 2-5 with overlapping of digits, pes planus, HAV deformity   Nuero: protective sensation absent

## 2019-05-31 NOTE — CONSULT NOTE ADULT - REASON FOR ADMISSION
perforated stomach ulcer

## 2019-05-31 NOTE — CONSULT NOTE ADULT - PROBLEM SELECTOR PROBLEM 1
Acute gastric ulcer with perforation
Diabetes mellitus type 2, uncontrolled
Osteomyelitis of left foot

## 2019-05-31 NOTE — PROGRESS NOTE ADULT - SUBJECTIVE AND OBJECTIVE BOX
[facility] -- Bolivar Carbajal, El, Brittany, Reynaldo Sweeney Savella  Office # [phone]      Follow Up:    Afib   Subjective/Observations:   No events overnight resting comfortably in bed.  No complaints of chest pain, dyspnea, or palpitations reported. No signs of orthopnea or PND.     REVIEW OF SYSTEMS: All other review of systems is negative unless indicated above    PAST MEDICAL & SURGICAL HISTORY:  Diabetes mellitus with no complication  No significant past surgical history      MEDICATIONS  (STANDING):  amiodarone    Tablet 200 milliGRAM(s) Oral daily  ceFAZolin   IVPB 2000 milliGRAM(s) IV Intermittent every 8 hours  dextrose 5%. 1000 milliLiter(s) (50 mL/Hr) IV Continuous <Continuous>  dextrose 50% Injectable 12.5 Gram(s) IV Push once  dextrose 50% Injectable 25 Gram(s) IV Push once  docusate sodium 100 milliGRAM(s) Oral three times a day  enoxaparin Injectable 40 milliGRAM(s) SubCutaneous at bedtime  insulin glargine Injectable (LANTUS) 10 Unit(s) SubCutaneous at bedtime  insulin lispro (HumaLOG) corrective regimen sliding scale   SubCutaneous Before meals and at bedtime  insulin lispro Injectable (HumaLOG) 3 Unit(s) SubCutaneous three times a day before meals  lactated ringers. 1000 milliLiter(s) (75 mL/Hr) IV Continuous <Continuous>  nicotine -  14 mG/24Hr(s) Patch 1 patch Transdermal daily  pantoprazole    Tablet 40 milliGRAM(s) Oral two times a day  polyethylene glycol 3350 17 Gram(s) Oral at bedtime  senna 2 Tablet(s) Oral at bedtime  tamsulosin 0.8 milliGRAM(s) Oral at bedtime    MEDICATIONS  (PRN):  acetaminophen   Tablet .. 650 milliGRAM(s) Oral every 6 hours PRN Mild Pain (1 - 3)  aluminum hydroxide/magnesium hydroxide/simethicone Suspension 30 milliLiter(s) Oral every 4 hours PRN Dyspepsia  dextrose 40% Gel 15 Gram(s) Oral once PRN Blood Glucose LESS THAN 70 milliGRAM(s)/deciliter  glucagon  Injectable 1 milliGRAM(s) IntraMuscular once PRN Glucose LESS THAN 70 milligrams/deciliter  nitroglycerin     SubLingual 0.4 milliGRAM(s) SubLingual every 5 minutes PRN Chest Pain  ondansetron Injectable 4 milliGRAM(s) IV Push every 8 hours PRN Nausea and/or Vomiting  oxyCODONE    5 mG/acetaminophen 325 mG 1 Tablet(s) Oral every 4 hours PRN Moderate Pain (4 - 6)  oxyCODONE    5 mG/acetaminophen 325 mG 2 Tablet(s) Oral every 6 hours PRN Severe Pain (7 - 10)      Allergies    No Known Allergies    Intolerances        Vital Signs Last 24 Hrs  T(C): 36.9 (31 May 2019 11:34), Max: 37.1 (31 May 2019 00:20)  T(F): 98.4 (31 May 2019 11:34), Max: 98.7 (31 May 2019 00:20)  HR: 95 (31 May 2019 11:59) (56 - 95)  BP: 104/63 (31 May 2019 11:35) (100/62 - 144/61)  BP(mean): --  RR: 20 (31 May 2019 11:34) (17 - 20)  SpO2: 95% (31 May 2019 11:59) (92% - 97%)    I&O's Summary    30 May 2019 07:01  -  31 May 2019 07:00  --------------------------------------------------------  IN: 1910 mL / OUT: 5225 mL / NET: -3315 mL    31 May 2019 07:01  -  31 May 2019 13:57  --------------------------------------------------------  IN: 50 mL / OUT: 0 mL / NET: 50 mL          PHYSICAL EXAM:  TELE: Off tele   Constitutional: NAD, awake and alert, well-developed  HEENT: Moist Mucous Membranes, Anicteric  Pulmonary: Non-labored, breath sounds are clear bilaterally, No wheezing, crackles or rhonchi  Cardiovascular: Regular, S1 and S2 nl, No murmurs, rubs, gallops or clicks  Gastrointestinal: Bowel Sounds present, soft, nontender.   Lymph: No lymphadenopathy. No peripheral edema.  Skin: No visible rashes or ulcers.  Psych:  Mood & affect appropriate    LABS: All Labs Reviewed:                        7.6    6.94  )-----------( 355      ( 31 May 2019 07:34 )             23.4                         7.6    8.37  )-----------( 355      ( 30 May 2019 05:36 )             23.6                         7.8    10.18 )-----------( 405      ( 29 May 2019 06:09 )             24.3     31 May 2019 07:34    142    |  107    |  13     ----------------------------<  126    3.7     |  29     |  1.30   30 May 2019 05:36    141    |  107    |  15     ----------------------------<  114    4.1     |  29     |  1.40   29 May 2019 06:09    141    |  107    |  23     ----------------------------<  226    4.2     |  29     |  1.40     Ca    8.3        31 May 2019 07:34  Ca    8.2        30 May 2019 05:36  Ca    8.1        29 May 2019 06:09    from: TTE Echo Doppler w/o Cont (05.16.19 @ 09:45) >     EXAM:  ECHO TTE WO CON COMP W DOPPLR         PROCEDURE DATE:  05/16/2019        INTERPRETATION:  INDICATION: Elevated troponin    Blood Pressure 137/62    Height 187.96 cm     Weight 81.6 kg       BSA   2.1 sq m    Dimensions:    LA 4.1       Normal Values: 2.0 - 4.0 cm    Ao 3.6        Normal Values: 2.0 - 3.8 cm  SEPTUM 1.3       Normal Values: 0.6 - 1.2 cm  PWT 1.3       Normal Values: 0.6 - 1.1 cm  LVIDd 6.0         Normal Values: 3.0 - 5.6 cm  LVIDs 3.1         Normal Values: 1.8 - 4.0 cm      OBSERVATIONS:    Mitral Valve: Thickened leaflets, moderate MR.  Aortic Valve/Aorta: Sclerotic trileaflet aortic valve with normal   opening. Trace AI  Tricuspid Valve: normal with mild TR.  Pulmonic Valve: normal  Left Atrium: Mildly dilated  Right Atrium: normal  Left Ventricle: normal LV size and systolic function, estimated LVEF of   65%. No evidence of segmental dysfunction. Basal septal hypertrophy is   present  Right Ventricle: normal size and systolic function.  Pericardium/Pleura: normal, no significant pericardial effusion.    Conclusion:     normal LV size and systolic function, estimated LVEF of 65%. No evidence   of segmental dysfunction  Normal RV size and systolic function.   Mild left atrial enlargement  Sclerotic trileaflet aortic valve with normal opening. Trace AI  Mitral valve with thickened leaflets and moderate MR  Mild TR  No significant pericardial effusion.        < end of copied text >    < from: 12 Lead ECG (05.22.19 @ 11:35) >  Ventricular Rate 65 BPM    Atrial Rate 65 BPM    P-R Interval 132 ms    QRS Duration 86 ms    Q-T Interval 428 ms    QTC Calculation(Bezet) 445 ms    P Axis 37 degrees    R Axis 23 degrees    T Axis 57 degrees    < end of copied text >      < from: US Duplex Venous Upper Ext Ltd, Right (05.15.19 @ 11:26) >  EXAM:  US DPLX [facility] EXT VEINS LTD RT                          PROCEDURE DATE:  05/15/2019      INTERPRETATION:  CLINICAL INFORMATION: Right upper extremity swelling.    COMPARISON: None available.    TECHNIQUE: Duplex sonography of the RIGHT UPPER extremity with color and   spectral Doppler, with and without compression.      FINDINGS:    The right internal jugular, subclavian, axillary, brachial, basilic and   cephalic veins are patent and compressible where applicable.     Doppler examination shows normal spontaneous and phasic flow.    IMPRESSION:     No evidence of right upper extremity deep venous thrombosis.    < end of copied text >    MR foot   IMPRESSION:   First distal metatarsal osteomyelitis, with enhancement in the   surrounding soft tissues. Soft tissue wound/ulceration, no focal soft   tissue abscess.  Chronic appearing irregularity at the distal fifth metatarsal, without   acute bone marrow edema enhancement.  Chronic-appearing deformity of the distal second metatarsal, with   dislocation at the second MTP joint.    < from: Xray Chest 1 View- PORTABLE-Routine (05.12.19 @ 05:45) >    EXAM:  XR CHEST PORTABLE ROUTINE 1V                          PROCEDURE DATE:  05/12/2019      INTERPRETATION:  Clinical information: Productive cough    Portable study, 5:39 AM    Comparison study dated 5/10/2019, 10:29 PM.    Cardiac monitorleads present. A nasogastric tube is present, the distal   end of the tube enters the stomach in the left upper quadrant.    Hazy appearance the lung bases likely related to a combination of   vascular shadows and overlying soft tissues. However recommend a   follow-up study, PA and lateral views of the chest to better define the   lung bases.    Heart size upper limits of normal magnified secondary to portable   technique. No acute osseous abnormalities.    IMPRESSION: See above report    LETY LONDONO M.D.,ATTENDING RADIOLOGIST  This document has been electronically signed. May 12 2019 12:53PM      < end of copied text >       Ed Paula ANP   Cardiology

## 2019-05-31 NOTE — PROGRESS NOTE ADULT - SUBJECTIVE AND OBJECTIVE BOX
CAPILLARY BLOOD GLUCOSE      POCT Blood Glucose.: 125 mg/dL (31 May 2019 07:44)  POCT Blood Glucose.: 238 mg/dL (30 May 2019 21:25)  POCT Blood Glucose.: 189 mg/dL (30 May 2019 17:08)  POCT Blood Glucose.: 219 mg/dL (30 May 2019 11:50)      Vital Signs Last 24 Hrs  T(C): 36.6 (31 May 2019 04:41), Max: 37.1 (30 May 2019 11:25)  T(F): 97.8 (31 May 2019 04:41), Max: 98.8 (30 May 2019 11:25)  HR: 58 (31 May 2019 04:41) (58 - 64)  BP: 106/66 (31 May 2019 04:41) (104/63 - 144/61)  BP(mean): --  RR: 18 (31 May 2019 04:41) (17 - 18)  SpO2: 92% (31 May 2019 04:41) (92% - 97%)    General: WN/WD NAD  Respiratory: CTA B/L  CV: RRR, S1S2, no murmurs, rubs or gallops  Abdominal: Soft, NT, ND +BS, Last BM  Extremities: No edema, + peripheral pulses     05-31    142  |  107  |  13  ----------------------------<  126<H>  3.7   |  29  |  1.30    Ca    8.3<L>      31 May 2019 07:34        dextrose 40% Gel 15 Gram(s) Oral once PRN  dextrose 50% Injectable 12.5 Gram(s) IV Push once  dextrose 50% Injectable 25 Gram(s) IV Push once  glucagon  Injectable 1 milliGRAM(s) IntraMuscular once PRN  insulin glargine Injectable (LANTUS) 10 Unit(s) SubCutaneous at bedtime  insulin lispro (HumaLOG) corrective regimen sliding scale   SubCutaneous Before meals and at bedtime  insulin lispro Injectable (HumaLOG) 3 Unit(s) SubCutaneous three times a day before meals

## 2019-05-31 NOTE — PROGRESS NOTE ADULT - ATTENDING COMMENTS
pt seen and examined  all consulta appreciated  continue IV abx until margins of surgical resection are available.  pt may need long term IV abx if margins are not clear of disease.

## 2019-05-31 NOTE — PROGRESS NOTE ADULT - PROBLEM SELECTOR PLAN 4
importance of compliance discussed  -Continue Lantus 10 units qhs. humalog 3 with meals  -Hypoglycemia Protocol, SINA, Gogo AC&HS.  -Diet: Consistent Carbs w/ Evening Snack.

## 2019-05-31 NOTE — CONSULT NOTE ADULT - ASSESSMENT
48 year old man, admitted May 10th with thehad sudden onset at 7 PM of abdominal pain which was found to be due to a perforated ulcer.  Perforated Antral Gastric Ulcer, s/p Emergent Exp-lap Oomentopexy and plication 5/11. Found to have new onset afib with elevated troponin post-operatively indicative of NSTEMI, started on Amiodarone and converted to sinus rhythm, then with left foot osteo OR 5/28 with amputation 'left foot hallux amputation and partial ray resection'.

## 2019-06-01 LAB
ANION GAP SERPL CALC-SCNC: 7 MMOL/L — SIGNIFICANT CHANGE UP (ref 5–17)
BUN SERPL-MCNC: 12 MG/DL — SIGNIFICANT CHANGE UP (ref 7–23)
CALCIUM SERPL-MCNC: 8.1 MG/DL — LOW (ref 8.5–10.1)
CHLORIDE SERPL-SCNC: 107 MMOL/L — SIGNIFICANT CHANGE UP (ref 96–108)
CO2 SERPL-SCNC: 29 MMOL/L — SIGNIFICANT CHANGE UP (ref 22–31)
CREAT SERPL-MCNC: 1.2 MG/DL — SIGNIFICANT CHANGE UP (ref 0.5–1.3)
GLUCOSE SERPL-MCNC: 92 MG/DL — SIGNIFICANT CHANGE UP (ref 70–99)
HCT VFR BLD CALC: 23.2 % — LOW (ref 39–50)
HGB BLD-MCNC: 7.5 G/DL — LOW (ref 13–17)
MCHC RBC-ENTMCNC: 31.6 PG — SIGNIFICANT CHANGE UP (ref 27–34)
MCHC RBC-ENTMCNC: 32.3 GM/DL — SIGNIFICANT CHANGE UP (ref 32–36)
MCV RBC AUTO: 97.9 FL — SIGNIFICANT CHANGE UP (ref 80–100)
NRBC # BLD: 0 /100 WBCS — SIGNIFICANT CHANGE UP (ref 0–0)
PLATELET # BLD AUTO: 354 K/UL — SIGNIFICANT CHANGE UP (ref 150–400)
POTASSIUM SERPL-MCNC: 3.6 MMOL/L — SIGNIFICANT CHANGE UP (ref 3.5–5.3)
POTASSIUM SERPL-SCNC: 3.6 MMOL/L — SIGNIFICANT CHANGE UP (ref 3.5–5.3)
RBC # BLD: 2.37 M/UL — LOW (ref 4.2–5.8)
RBC # FLD: 13.6 % — SIGNIFICANT CHANGE UP (ref 10.3–14.5)
SODIUM SERPL-SCNC: 143 MMOL/L — SIGNIFICANT CHANGE UP (ref 135–145)
WBC # BLD: 6.21 K/UL — SIGNIFICANT CHANGE UP (ref 3.8–10.5)
WBC # FLD AUTO: 6.21 K/UL — SIGNIFICANT CHANGE UP (ref 3.8–10.5)

## 2019-06-01 PROCEDURE — 99232 SBSQ HOSP IP/OBS MODERATE 35: CPT

## 2019-06-01 RX ADMIN — Medication 3 UNIT(S): at 17:29

## 2019-06-01 RX ADMIN — Medication 3 UNIT(S): at 12:21

## 2019-06-01 RX ADMIN — ENOXAPARIN SODIUM 40 MILLIGRAM(S): 100 INJECTION SUBCUTANEOUS at 21:13

## 2019-06-01 RX ADMIN — TAMSULOSIN HYDROCHLORIDE 0.8 MILLIGRAM(S): 0.4 CAPSULE ORAL at 21:12

## 2019-06-01 RX ADMIN — SENNA PLUS 2 TABLET(S): 8.6 TABLET ORAL at 21:12

## 2019-06-01 RX ADMIN — OXYCODONE AND ACETAMINOPHEN 2 TABLET(S): 5; 325 TABLET ORAL at 21:44

## 2019-06-01 RX ADMIN — Medication 100 MILLIGRAM(S): at 05:33

## 2019-06-01 RX ADMIN — INSULIN GLARGINE 10 UNIT(S): 100 INJECTION, SOLUTION SUBCUTANEOUS at 21:36

## 2019-06-01 RX ADMIN — Medication 4: at 17:28

## 2019-06-01 RX ADMIN — PANTOPRAZOLE SODIUM 40 MILLIGRAM(S): 20 TABLET, DELAYED RELEASE ORAL at 17:29

## 2019-06-01 RX ADMIN — Medication 1 PATCH: at 08:01

## 2019-06-01 RX ADMIN — POLYETHYLENE GLYCOL 3350 17 GRAM(S): 17 POWDER, FOR SOLUTION ORAL at 21:13

## 2019-06-01 RX ADMIN — PANTOPRAZOLE SODIUM 40 MILLIGRAM(S): 20 TABLET, DELAYED RELEASE ORAL at 05:33

## 2019-06-01 RX ADMIN — Medication 100 MILLIGRAM(S): at 21:12

## 2019-06-01 RX ADMIN — Medication 1 PATCH: at 12:22

## 2019-06-01 RX ADMIN — Medication 1 PATCH: at 12:20

## 2019-06-01 RX ADMIN — Medication 3 UNIT(S): at 08:15

## 2019-06-01 RX ADMIN — Medication 100 MILLIGRAM(S): at 13:19

## 2019-06-01 RX ADMIN — AMIODARONE HYDROCHLORIDE 200 MILLIGRAM(S): 400 TABLET ORAL at 05:33

## 2019-06-01 RX ADMIN — OXYCODONE AND ACETAMINOPHEN 2 TABLET(S): 5; 325 TABLET ORAL at 00:11

## 2019-06-01 RX ADMIN — Medication 4: at 21:35

## 2019-06-01 NOTE — PROGRESS NOTE ADULT - SUBJECTIVE AND OBJECTIVE BOX
CAPILLARY BLOOD GLUCOSE      POCT Blood Glucose.: 111 mg/dL (01 Jun 2019 08:00)  POCT Blood Glucose.: 206 mg/dL (31 May 2019 21:18)  POCT Blood Glucose.: 153 mg/dL (31 May 2019 16:56)  POCT Blood Glucose.: 216 mg/dL (31 May 2019 11:39)      Vital Signs Last 24 Hrs  T(C): 36.9 (01 Jun 2019 08:00), Max: 37 (31 May 2019 15:26)  T(F): 98.4 (01 Jun 2019 08:00), Max: 98.6 (31 May 2019 15:26)  HR: 61 (01 Jun 2019 08:00) (56 - 95)  BP: 115/71 (01 Jun 2019 08:00) (100/62 - 133/76)  BP(mean): --  RR: 18 (01 Jun 2019 08:00) (17 - 20)  SpO2: 92% (01 Jun 2019 08:00) (92% - 97%)    General: WN/WD NAD  Respiratory: CTA B/L  CV: RRR, S1S2, no murmurs, rubs or gallops  Abdominal: Soft, NT, ND +BS, Last BM  Extremities: b/l le foot dsg intact     06-01    143  |  107  |  12  ----------------------------<  92  3.6   |  29  |  1.20    Ca    8.1<L>      01 Jun 2019 07:03        dextrose 40% Gel 15 Gram(s) Oral once PRN  dextrose 50% Injectable 12.5 Gram(s) IV Push once  dextrose 50% Injectable 25 Gram(s) IV Push once  glucagon  Injectable 1 milliGRAM(s) IntraMuscular once PRN  insulin glargine Injectable (LANTUS) 10 Unit(s) SubCutaneous at bedtime  insulin lispro (HumaLOG) corrective regimen sliding scale   SubCutaneous Before meals and at bedtime  insulin lispro Injectable (HumaLOG) 3 Unit(s) SubCutaneous three times a day before meals

## 2019-06-01 NOTE — PROGRESS NOTE ADULT - PROBLEM SELECTOR PLAN 1
cont lantus 10 units qhs  cont humalog 3 units 3x/day before meals  cont mod dose humalog scale coverage  goal bg 100-180 in hosp setting  cont cons cho diet

## 2019-06-01 NOTE — PROGRESS NOTE ADULT - SUBJECTIVE AND OBJECTIVE BOX
Patient is a 48y old  Male who presents with a chief complaint of perforated stomach ulcer (01 Jun 2019 11:13)      INTERVAL HPI/OVERNIGHT EVENTS: Patient seen and examined at the bedside. Denies foot pain, bleeding or fever. No acute events overnight.     T(C): 36.7 (06-01-19 @ 12:00), Max: 37 (05-31-19 @ 15:26)  HR: 54 (06-01-19 @ 12:00) (54 - 78)  BP: 106/68 (06-01-19 @ 12:00) (106/68 - 133/76)  RR: 18 (06-01-19 @ 12:00) (17 - 18)  SpO2: 95% (06-01-19 @ 12:00) (92% - 97%)  Wt(kg): --    I&O's Summary    31 May 2019 07:01  -  01 Jun 2019 07:00  --------------------------------------------------------  IN: 2735 mL / OUT: 3900 mL / NET: -1165 mL        LABS:                        7.5    6.21  )-----------( 354      ( 01 Jun 2019 07:03 )             23.2     06-01    143  |  107  |  12  ----------------------------<  92  3.6   |  29  |  1.20    Ca    8.1<L>      01 Jun 2019 07:03        CAPILLARY BLOOD GLUCOSE      POCT Blood Glucose.: 130 mg/dL (01 Jun 2019 11:36)  POCT Blood Glucose.: 111 mg/dL (01 Jun 2019 08:00)  POCT Blood Glucose.: 206 mg/dL (31 May 2019 21:18)  POCT Blood Glucose.: 153 mg/dL (31 May 2019 16:56)          Culture - Tissue with Gram Stain (collected 29 May 2019 01:11)  Source: .Tissue Other, bone left 1st metatarsal head  Gram Stain (29 May 2019 06:01):    Rare polymorphonuclear leukocytes seen per low power field    No organisms seen per oil power field  Preliminary Report (29 May 2019 21:46):    Rare Staphylococcus aureus    Few Coag Negative Staphylococcus  Organism: Staphylococcus aureus  Coag Negative Staphylococcus (30 May 2019 22:06)  Organism: Coag Negative Staphylococcus (30 May 2019 22:06)  Organism: Staphylococcus aureus (30 May 2019 22:06)    Culture - Tissue with Gram Stain (collected 29 May 2019 01:11)  Source: .Tissue Other, bone left great toe  Gram Stain (29 May 2019 06:00):    No polymorphonuclear cells seen per low power field    No organisms seen per oil power field  Preliminary Report (29 May 2019 21:42):    No growth         MEDICATIONS  (STANDING):  amiodarone    Tablet 200 milliGRAM(s) Oral daily  dextrose 5%. 1000 milliLiter(s) (50 mL/Hr) IV Continuous <Continuous>  dextrose 50% Injectable 12.5 Gram(s) IV Push once  dextrose 50% Injectable 25 Gram(s) IV Push once  docusate sodium 100 milliGRAM(s) Oral three times a day  enoxaparin Injectable 40 milliGRAM(s) SubCutaneous at bedtime  insulin glargine Injectable (LANTUS) 10 Unit(s) SubCutaneous at bedtime  insulin lispro (HumaLOG) corrective regimen sliding scale   SubCutaneous Before meals and at bedtime  insulin lispro Injectable (HumaLOG) 3 Unit(s) SubCutaneous three times a day before meals  lactated ringers. 1000 milliLiter(s) (75 mL/Hr) IV Continuous <Continuous>  nicotine -  14 mG/24Hr(s) Patch 1 patch Transdermal daily  pantoprazole    Tablet 40 milliGRAM(s) Oral two times a day  polyethylene glycol 3350 17 Gram(s) Oral at bedtime  senna 2 Tablet(s) Oral at bedtime  tamsulosin 0.8 milliGRAM(s) Oral at bedtime    MEDICATIONS  (PRN):  acetaminophen   Tablet .. 650 milliGRAM(s) Oral every 6 hours PRN Mild Pain (1 - 3)  aluminum hydroxide/magnesium hydroxide/simethicone Suspension 30 milliLiter(s) Oral every 4 hours PRN Dyspepsia  dextrose 40% Gel 15 Gram(s) Oral once PRN Blood Glucose LESS THAN 70 milliGRAM(s)/deciliter  glucagon  Injectable 1 milliGRAM(s) IntraMuscular once PRN Glucose LESS THAN 70 milligrams/deciliter  nitroglycerin     SubLingual 0.4 milliGRAM(s) SubLingual every 5 minutes PRN Chest Pain  ondansetron Injectable 4 milliGRAM(s) IV Push every 8 hours PRN Nausea and/or Vomiting  oxyCODONE    5 mG/acetaminophen 325 mG 1 Tablet(s) Oral every 4 hours PRN Moderate Pain (4 - 6)  oxyCODONE    5 mG/acetaminophen 325 mG 2 Tablet(s) Oral every 6 hours PRN Severe Pain (7 - 10)      REVIEW OF SYSTEMS:  CONSTITUTIONAL: denies fever, chills, fatigue, weakness  HEENT: denies blurred vision, sore throat  CARDIOVASCULAR: denies chest pain, chest pressure, palpitations  RESPIRATORY: denies shortness of breath, sputum production  GASTROINTESTINAL: denies nausea, vomiting, diarrhea, abdominal pain  GENITOURINARY: denies dysuria, discharge  NEUROLOGICAL: denies numbness, headache, focal weakness  MUSCULOSKELETAL: denies new joint pain, muscle aches  HEMATOLOGIC: denies gross bleeding, bruising    RADIOLOGY & ADDITIONAL TESTS:    Imaging Personally Reviewed:  [ x ] YES  [ ] NO    Consultant(s) Notes Reviewed:  [ x ] YES  [ ] NO    PHYSICAL EXAM:  General: Well developed, well nourished, NAD  HEENT: NCAT, moist mucous membranes   Neurology: A&Ox3, nonfocal  Respiratory: CTA B/L, No wheezing, rales, or rhonchi  CV: RRR, S1/S2 present, no murmurs  Abdominal: Soft, nontender, non-distended, normoactive bowel sounds  Extremities: +1-2 edema b/l, peripheral pulses present, Left foot dressing clean, dry, intact, surgical site c/d/i  MSK: Normal ROM, no joint erythema or warmth, no joint swelling   Skin: warm, dry, normal color    Care Discussed with Consultants/Other Providers [ x ] YES  [ ] NO

## 2019-06-01 NOTE — PROGRESS NOTE ADULT - SUBJECTIVE AND OBJECTIVE BOX
[facility] -- Bolivar Carbajal, El, Brittany, Reynaldo Sweeney Savella  Office # [phone]    Follow Up:  Afib with RVR, Preop Eval    Subjective/Observations: Awake and alert.  No overnight events.  No foot pain. No respiratory or cardiac discomfort    REVIEW OF SYSTEMS: All other review of systems is negative unless indicated above    PAST MEDICAL & SURGICAL HISTORY:  Diabetes mellitus with no complication  No significant past surgical history    MEDICATIONS  (STANDING):  amiodarone    Tablet 200 milliGRAM(s) Oral daily  ceFAZolin   IVPB 2000 milliGRAM(s) IV Intermittent every 8 hours  dextrose 5%. 1000 milliLiter(s) (50 mL/Hr) IV Continuous <Continuous>  dextrose 50% Injectable 12.5 Gram(s) IV Push once  dextrose 50% Injectable 25 Gram(s) IV Push once  docusate sodium 100 milliGRAM(s) Oral three times a day  enoxaparin Injectable 40 milliGRAM(s) SubCutaneous at bedtime  insulin glargine Injectable (LANTUS) 10 Unit(s) SubCutaneous at bedtime  insulin lispro (HumaLOG) corrective regimen sliding scale   SubCutaneous Before meals and at bedtime  insulin lispro Injectable (HumaLOG) 3 Unit(s) SubCutaneous three times a day before meals  lactated ringers. 1000 milliLiter(s) (75 mL/Hr) IV Continuous <Continuous>  nicotine -  14 mG/24Hr(s) Patch 1 patch Transdermal daily  pantoprazole    Tablet 40 milliGRAM(s) Oral two times a day  polyethylene glycol 3350 17 Gram(s) Oral at bedtime  senna 2 Tablet(s) Oral at bedtime  tamsulosin 0.8 milliGRAM(s) Oral at bedtime    MEDICATIONS  (PRN):  acetaminophen   Tablet .. 650 milliGRAM(s) Oral every 6 hours PRN Mild Pain (1 - 3)  aluminum hydroxide/magnesium hydroxide/simethicone Suspension 30 milliLiter(s) Oral every 4 hours PRN Dyspepsia  dextrose 40% Gel 15 Gram(s) Oral once PRN Blood Glucose LESS THAN 70 milliGRAM(s)/deciliter  glucagon  Injectable 1 milliGRAM(s) IntraMuscular once PRN Glucose LESS THAN 70 milligrams/deciliter  nitroglycerin     SubLingual 0.4 milliGRAM(s) SubLingual every 5 minutes PRN Chest Pain  ondansetron Injectable 4 milliGRAM(s) IV Push every 8 hours PRN Nausea and/or Vomiting  oxyCODONE    5 mG/acetaminophen 325 mG 1 Tablet(s) Oral every 4 hours PRN Moderate Pain (4 - 6)  oxyCODONE    5 mG/acetaminophen 325 mG 2 Tablet(s) Oral every 6 hours PRN Severe Pain (7 - 10)    Allergies    No Known Allergies    Intolerances    Vital Signs Last 24 Hrs  T(C): 36.9 (01 Jun 2019 08:00), Max: 37 (31 May 2019 15:26)  T(F): 98.4 (01 Jun 2019 08:00), Max: 98.6 (31 May 2019 15:26)  HR: 61 (01 Jun 2019 08:00) (56 - 95)  BP: 115/71 (01 Jun 2019 08:00) (100/62 - 133/76)  BP(mean): --  RR: 18 (01 Jun 2019 08:00) (17 - 20)  SpO2: 92% (01 Jun 2019 08:00) (92% - 97%)    I&O's Summary    31 May 2019 07:01  -  01 Jun 2019 07:00  --------------------------------------------------------  IN: 2735 mL / OUT: 3900 mL / NET: -1165 mL    PHYSICAL EXAM:  TELE: Not on tele  Constitutional: NAD, awake and alert, well-developed  HEENT: Moist Mucous Membranes, Anicteric  Pulmonary: Non-labored, breath sounds are clear bilaterally, No wheezing, rales or rhonchi  Cardiovascular: Regular, S1 and S2, No murmurs, rubs, gallops or clicks  Gastrointestinal: Bowel Sounds present, soft, nontender.   : jacques in situ  Extr: Left foot dressing dry and intact  Lymph: No peripheral edema. No lymphadenopathy.  Skin: No visible rashes.  Psych:  Mood & affect appropriate    LABS: All Labs Reviewed:                        7.5    6.21  )-----------( 354      ( 01 Jun 2019 07:03 )             23.2                         7.6    6.94  )-----------( 355      ( 31 May 2019 07:34 )             23.4                         7.6    8.37  )-----------( 355      ( 30 May 2019 05:36 )             23.6     01 Jun 2019 07:03    143    |  107    |  12     ----------------------------<  92     3.6     |  29     |  1.20   31 May 2019 07:34    142    |  107    |  13     ----------------------------<  126    3.7     |  29     |  1.30   30 May 2019 05:36    141    |  107    |  15     ----------------------------<  114    4.1     |  29     |  1.40     Ca    8.1        01 Jun 2019 07:03  Ca    8.3        31 May 2019 07:34  Ca    8.2        30 May 2019 05:36    from: TTE Echo Doppler w/o Cont (05.16.19 @ 09:45) >     EXAM:  ECHO TTE WO CON COMP W DOPPLR         PROCEDURE DATE:  05/16/2019        INTERPRETATION:  INDICATION: Elevated troponin    Blood Pressure 137/62    Height 187.96 cm     Weight 81.6 kg       BSA   2.1 sq m    Dimensions:    LA 4.1       Normal Values: 2.0 - 4.0 cm    Ao 3.6        Normal Values: 2.0 - 3.8 cm  SEPTUM 1.3       Normal Values: 0.6 - 1.2 cm  PWT 1.3       Normal Values: 0.6 - 1.1 cm  LVIDd 6.0         Normal Values: 3.0 - 5.6 cm  LVIDs 3.1         Normal Values: 1.8 - 4.0 cm      OBSERVATIONS:    Mitral Valve: Thickened leaflets, moderate MR.  Aortic Valve/Aorta: Sclerotic trileaflet aortic valve with normal   opening. Trace AI  Tricuspid Valve: normal with mild TR.  Pulmonic Valve: normal  Left Atrium: Mildly dilated  Right Atrium: normal  Left Ventricle: normal LV size and systolic function, estimated LVEF of   65%. No evidence of segmental dysfunction. Basal septal hypertrophy is   present  Right Ventricle: normal size and systolic function.  Pericardium/Pleura: normal, no significant pericardial effusion.    Conclusion:     normal LV size and systolic function, estimated LVEF of 65%. No evidence   of segmental dysfunction  Normal RV size and systolic function.   Mild left atrial enlargement  Sclerotic trileaflet aortic valve with normal opening. Trace AI  Mitral valve with thickened leaflets and moderate MR  Mild TR  No significant pericardial effusion.        < end of copied text >    < from: 12 Lead ECG (05.22.19 @ 11:35) >  Ventricular Rate 65 BPM    Atrial Rate 65 BPM    P-R Interval 132 ms    QRS Duration 86 ms    Q-T Interval 428 ms    QTC Calculation(Bezet) 445 ms    P Axis 37 degrees    R Axis 23 degrees    T Axis 57 degrees    < end of copied text >      < from: US Duplex Venous Upper Ext Ltd, Right (05.15.19 @ 11:26) >  EXAM:  US DPLX UPR EXT VEINS LTD RT                          PROCEDURE DATE:  05/15/2019      INTERPRETATION:  CLINICAL INFORMATION: Right upper extremity swelling.    COMPARISON: None available.    TECHNIQUE: Duplex sonography of the RIGHT UPPER extremity with color and   spectral Doppler, with and without compression.      FINDINGS:    The right internal jugular, subclavian, axillary, brachial, basilic and   cephalic veins are patent and compressible where applicable.     Doppler examination shows normal spontaneous and phasic flow.    IMPRESSION:     No evidence of right upper extremity deep venous thrombosis.    < end of copied text >    MR foot   IMPRESSION:   First distal metatarsal osteomyelitis, with enhancement in the   surrounding soft tissues. Soft tissue wound/ulceration, no focal soft   tissue abscess.  Chronic appearing irregularity at the distal fifth metatarsal, without   acute bone marrow edema enhancement.  Chronic-appearing deformity of the distal second metatarsal, with   dislocation at the second MTP joint.    < from: Xray Chest 1 View- PORTABLE-Routine (05.12.19 @ 05:45) >    EXAM:  XR CHEST PORTABLE ROUTINE 1V                          PROCEDURE DATE:  05/12/2019      INTERPRETATION:  Clinical information: Productive cough    Portable study, 5:39 AM    Comparison study dated 5/10/2019, 10:29 PM.    Cardiac monitorleads present. A nasogastric tube is present, the distal   end of the tube enters the stomach in the left upper quadrant.    Hazy appearance the lung bases likely related to a combination of   vascular shadows and overlying soft tissues. However recommend a   follow-up study, PA and lateral views of the chest to better define the   lung bases.    Heart size upper limits of normal magnified secondary to portable   technique. No acute osseous abnormalities.    IMPRESSION: See above report    LETY LONDONO M.D.,ATTENDING RADIOLOGIST  This document has been electronically signed. May 12 2019 12:53PM      < end of copied text >

## 2019-06-01 NOTE — PROGRESS NOTE ADULT - PROBLEM SELECTOR PLAN 1
first distal metatarsal osteomyelitis noted on MRI. POD3 from L foot hallux amputation and partial ray resection (5/28).  blood and urine cx neg  tissue cx 1st metatarsal staph and coag neg staph   tissue cx great toe neg  fu surgical path- clear margins; abx discontinued  ID consult- my for abx choice and duration of treatment, fu rec  podiatry: Hoina  pain control with percocet  bowel regimen  now off abx with surgical margins clear for osteo; if stable after 24 hours without fever or spike in WBC can be d/c off abx  dispo planning to [facility]

## 2019-06-02 DIAGNOSIS — D50.0 IRON DEFICIENCY ANEMIA SECONDARY TO BLOOD LOSS (CHRONIC): ICD-10-CM

## 2019-06-02 LAB
ANION GAP SERPL CALC-SCNC: 7 MMOL/L — SIGNIFICANT CHANGE UP (ref 5–17)
BUN SERPL-MCNC: 13 MG/DL — SIGNIFICANT CHANGE UP (ref 7–23)
CALCIUM SERPL-MCNC: 8 MG/DL — LOW (ref 8.5–10.1)
CHLORIDE SERPL-SCNC: 107 MMOL/L — SIGNIFICANT CHANGE UP (ref 96–108)
CO2 SERPL-SCNC: 29 MMOL/L — SIGNIFICANT CHANGE UP (ref 22–31)
CREAT SERPL-MCNC: 1.1 MG/DL — SIGNIFICANT CHANGE UP (ref 0.5–1.3)
CULTURE RESULTS: SIGNIFICANT CHANGE UP
CULTURE RESULTS: SIGNIFICANT CHANGE UP
GLUCOSE SERPL-MCNC: 117 MG/DL — HIGH (ref 70–99)
HCT VFR BLD CALC: 23.3 % — LOW (ref 39–50)
HGB BLD-MCNC: 7.3 G/DL — LOW (ref 13–17)
MCHC RBC-ENTMCNC: 30.8 PG — SIGNIFICANT CHANGE UP (ref 27–34)
MCHC RBC-ENTMCNC: 31.3 GM/DL — LOW (ref 32–36)
MCV RBC AUTO: 98.3 FL — SIGNIFICANT CHANGE UP (ref 80–100)
NRBC # BLD: 0 /100 WBCS — SIGNIFICANT CHANGE UP (ref 0–0)
ORGANISM # SPEC MICROSCOPIC CNT: SIGNIFICANT CHANGE UP
PLATELET # BLD AUTO: 375 K/UL — SIGNIFICANT CHANGE UP (ref 150–400)
POTASSIUM SERPL-MCNC: 3.9 MMOL/L — SIGNIFICANT CHANGE UP (ref 3.5–5.3)
POTASSIUM SERPL-SCNC: 3.9 MMOL/L — SIGNIFICANT CHANGE UP (ref 3.5–5.3)
RBC # BLD: 2.37 M/UL — LOW (ref 4.2–5.8)
RBC # FLD: 13.7 % — SIGNIFICANT CHANGE UP (ref 10.3–14.5)
SODIUM SERPL-SCNC: 143 MMOL/L — SIGNIFICANT CHANGE UP (ref 135–145)
SPECIMEN SOURCE: SIGNIFICANT CHANGE UP
SPECIMEN SOURCE: SIGNIFICANT CHANGE UP
WBC # BLD: 6.25 K/UL — SIGNIFICANT CHANGE UP (ref 3.8–10.5)
WBC # FLD AUTO: 6.25 K/UL — SIGNIFICANT CHANGE UP (ref 3.8–10.5)

## 2019-06-02 PROCEDURE — 99232 SBSQ HOSP IP/OBS MODERATE 35: CPT

## 2019-06-02 RX ADMIN — Medication 2: at 11:58

## 2019-06-02 RX ADMIN — PANTOPRAZOLE SODIUM 40 MILLIGRAM(S): 20 TABLET, DELAYED RELEASE ORAL at 17:19

## 2019-06-02 RX ADMIN — Medication 1 PATCH: at 07:30

## 2019-06-02 RX ADMIN — ONDANSETRON 4 MILLIGRAM(S): 8 TABLET, FILM COATED ORAL at 21:41

## 2019-06-02 RX ADMIN — Medication 3 UNIT(S): at 08:18

## 2019-06-02 RX ADMIN — Medication 100 MILLIGRAM(S): at 05:58

## 2019-06-02 RX ADMIN — ENOXAPARIN SODIUM 40 MILLIGRAM(S): 100 INJECTION SUBCUTANEOUS at 21:40

## 2019-06-02 RX ADMIN — Medication 1 PATCH: at 20:03

## 2019-06-02 RX ADMIN — AMIODARONE HYDROCHLORIDE 200 MILLIGRAM(S): 400 TABLET ORAL at 05:58

## 2019-06-02 RX ADMIN — Medication 3 UNIT(S): at 11:58

## 2019-06-02 RX ADMIN — Medication 1 PATCH: at 11:55

## 2019-06-02 RX ADMIN — TAMSULOSIN HYDROCHLORIDE 0.8 MILLIGRAM(S): 0.4 CAPSULE ORAL at 21:46

## 2019-06-02 RX ADMIN — Medication 2: at 21:44

## 2019-06-02 RX ADMIN — OXYCODONE AND ACETAMINOPHEN 2 TABLET(S): 5; 325 TABLET ORAL at 22:15

## 2019-06-02 RX ADMIN — SENNA PLUS 2 TABLET(S): 8.6 TABLET ORAL at 21:41

## 2019-06-02 RX ADMIN — PANTOPRAZOLE SODIUM 40 MILLIGRAM(S): 20 TABLET, DELAYED RELEASE ORAL at 05:58

## 2019-06-02 RX ADMIN — Medication 100 MILLIGRAM(S): at 13:29

## 2019-06-02 RX ADMIN — INSULIN GLARGINE 10 UNIT(S): 100 INJECTION, SOLUTION SUBCUTANEOUS at 21:42

## 2019-06-02 RX ADMIN — Medication 2: at 17:17

## 2019-06-02 RX ADMIN — Medication 3 UNIT(S): at 17:18

## 2019-06-02 RX ADMIN — OXYCODONE AND ACETAMINOPHEN 2 TABLET(S): 5; 325 TABLET ORAL at 21:41

## 2019-06-02 NOTE — PROGRESS NOTE ADULT - ASSESSMENT
48 year old male with HTN and DM2, who presented with Perforated Antral Gastric Ulcer, POD 5 s/p Emergent Exp-lap Oomentopexy and plication. He had an RRT for atrial fibrillation with rapid ventricular response, which is new. He has had difficult to control periods of tachycardia and bradycardia, currently in a sinus rhythm this morning   He had an elevated troponin possibly suggestive of non-ST segment elevation myocardial infarction/demand ischemia. He has no known history of coronary artery disease    Afib  - Remains in SR clinically.  No further evidence of Afib after initial episode while on tele.  Per flow sheet, HR has been controlled  - Continue Amio, plan is to stop Amio as an outpatient and consider BB instead  - TTE with normal LV function, moderate MR   - No need for long term AC as Afib was short lived   - Maintain  K>4, Mg>2  - Recommend an event monitor as outpatient.    Anemia  - H&H noted.  Hgb still at 7.  He remains hemodynamically stable with no overt signs of bleeding  - As per primary team  - Off full dose AC    S/P omentopexy and plication of gastric ulcer   - Tolerated procedure well, however, had Afib postop, spontaneously to NSR    Left foot OM  - MRI with left foot osteomyelitis and now S/P left hallux resection with debridement tolerated procedure w/o cardiac compromise   - ID following.  Planned for long term Abx    - All other workup per Primary. Will continue to follow     Henrietta Kim DNP  Cardiology

## 2019-06-02 NOTE — PROGRESS NOTE ADULT - SUBJECTIVE AND OBJECTIVE BOX
[facility] -- Bolivar Carbajal, El, Brittany, Reynaldo Sweeney Savella  Office # [phone]    Follow Up:  Afib with RVR, Preop Eval    Subjective/Observations: Awake and alert.  No overnight events.  Denies foot pain. No respiratory or cardiac discomfort.  Remains comfortable on RA    REVIEW OF SYSTEMS: All other review of systems is negative unless indicated above    PAST MEDICAL & SURGICAL HISTORY:  Diabetes mellitus with no complication  No significant past surgical history    MEDICATIONS  (STANDING):  amiodarone    Tablet 200 milliGRAM(s) Oral daily  dextrose 5%. 1000 milliLiter(s) (50 mL/Hr) IV Continuous <Continuous>  dextrose 50% Injectable 12.5 Gram(s) IV Push once  dextrose 50% Injectable 25 Gram(s) IV Push once  docusate sodium 100 milliGRAM(s) Oral three times a day  enoxaparin Injectable 40 milliGRAM(s) SubCutaneous at bedtime  insulin glargine Injectable (LANTUS) 10 Unit(s) SubCutaneous at bedtime  insulin lispro (HumaLOG) corrective regimen sliding scale   SubCutaneous Before meals and at bedtime  insulin lispro Injectable (HumaLOG) 3 Unit(s) SubCutaneous three times a day before meals  nicotine -  14 mG/24Hr(s) Patch 1 patch Transdermal daily  pantoprazole    Tablet 40 milliGRAM(s) Oral two times a day  polyethylene glycol 3350 17 Gram(s) Oral at bedtime  senna 2 Tablet(s) Oral at bedtime  tamsulosin 0.8 milliGRAM(s) Oral at bedtime    MEDICATIONS  (PRN):  acetaminophen   Tablet .. 650 milliGRAM(s) Oral every 6 hours PRN Mild Pain (1 - 3)  aluminum hydroxide/magnesium hydroxide/simethicone Suspension 30 milliLiter(s) Oral every 4 hours PRN Dyspepsia  dextrose 40% Gel 15 Gram(s) Oral once PRN Blood Glucose LESS THAN 70 milliGRAM(s)/deciliter  glucagon  Injectable 1 milliGRAM(s) IntraMuscular once PRN Glucose LESS THAN 70 milligrams/deciliter  nitroglycerin     SubLingual 0.4 milliGRAM(s) SubLingual every 5 minutes PRN Chest Pain  ondansetron Injectable 4 milliGRAM(s) IV Push every 8 hours PRN Nausea and/or Vomiting  oxyCODONE    5 mG/acetaminophen 325 mG 1 Tablet(s) Oral every 4 hours PRN Moderate Pain (4 - 6)  oxyCODONE    5 mG/acetaminophen 325 mG 2 Tablet(s) Oral every 6 hours PRN Severe Pain (7 - 10)    Allergies    No Known Allergies    Intolerances    Vital Signs Last 24 Hrs  T(C): 36.8 (02 Jun 2019 08:45), Max: 37.2 (01 Jun 2019 19:25)  T(F): 98.2 (02 Jun 2019 08:45), Max: 98.9 (01 Jun 2019 19:25)  HR: 59 (02 Jun 2019 08:45) (54 - 64)  BP: 126/75 (02 Jun 2019 08:45) (100/65 - 129/75)  BP(mean): --  RR: 18 (02 Jun 2019 08:45) (18 - 18)  SpO2: 96% (02 Jun 2019 08:45) (93% - 98%)    I&O's Summary    01 Jun 2019 07:01  -  02 Jun 2019 07:00  --------------------------------------------------------  IN: 2370 mL / OUT: 3190 mL / NET: -820 mL    PHYSICAL EXAM:  TELE: Not on tele  Constitutional: NAD, awake and alert, well-developed  HEENT: Moist Mucous Membranes, Anicteric  Pulmonary: Non-labored, breath sounds are clear bilaterally, No wheezing, rales or rhonchi  Cardiovascular: Regular, S1 and S2, No murmurs, rubs, gallops or clicks  Gastrointestinal: Bowel Sounds present, soft, nontender.   Extr: Left foot dressing dry and intact  Lymph: No peripheral edema. No lymphadenopathy.  Skin: No visible rashes.  Psych:  Mood & affect appropriate    LABS: All Labs Reviewed:                        7.3    6.25  )-----------( 375      ( 02 Jun 2019 06:40 )             23.3                         7.5    6.21  )-----------( 354      ( 01 Jun 2019 07:03 )             23.2                         7.6    6.94  )-----------( 355      ( 31 May 2019 07:34 )             23.4     02 Jun 2019 06:40    143    |  107    |  13     ----------------------------<  117    3.9     |  29     |  1.10   01 Jun 2019 07:03    143    |  107    |  12     ----------------------------<  92     3.6     |  29     |  1.20   31 May 2019 07:34    142    |  107    |  13     ----------------------------<  126    3.7     |  29     |  1.30     Ca    8.0        02 Jun 2019 06:40  Ca    8.1        01 Jun 2019 07:03  Ca    8.3        31 May 2019 07:34    from: TTE Echo Doppler w/o Cont (05.16.19 @ 09:45) >     EXAM:  ECHO TTE WO CON COMP W DOPPLR         PROCEDURE DATE:  05/16/2019        INTERPRETATION:  INDICATION: Elevated troponin    Blood Pressure 137/62    Height 187.96 cm     Weight 81.6 kg       BSA   2.1 sq m    Dimensions:    LA 4.1       Normal Values: 2.0 - 4.0 cm    Ao 3.6        Normal Values: 2.0 - 3.8 cm  SEPTUM 1.3       Normal Values: 0.6 - 1.2 cm  PWT 1.3       Normal Values: 0.6 - 1.1 cm  LVIDd 6.0         Normal Values: 3.0 - 5.6 cm  LVIDs 3.1         Normal Values: 1.8 - 4.0 cm      OBSERVATIONS:    Mitral Valve: Thickened leaflets, moderate MR.  Aortic Valve/Aorta: Sclerotic trileaflet aortic valve with normal   opening. Trace AI  Tricuspid Valve: normal with mild TR.  Pulmonic Valve: normal  Left Atrium: Mildly dilated  Right Atrium: normal  Left Ventricle: normal LV size and systolic function, estimated LVEF of   65%. No evidence of segmental dysfunction. Basal septal hypertrophy is   present  Right Ventricle: normal size and systolic function.  Pericardium/Pleura: normal, no significant pericardial effusion.    Conclusion:     normal LV size and systolic function, estimated LVEF of 65%. No evidence   of segmental dysfunction  Normal RV size and systolic function.   Mild left atrial enlargement  Sclerotic trileaflet aortic valve with normal opening. Trace AI  Mitral valve with thickened leaflets and moderate MR  Mild TR  No significant pericardial effusion.        < end of copied text >    < from: 12 Lead ECG (05.22.19 @ 11:35) >  Ventricular Rate 65 BPM    Atrial Rate 65 BPM    P-R Interval 132 ms    QRS Duration 86 ms    Q-T Interval 428 ms    QTC Calculation(Bezet) 445 ms    P Axis 37 degrees    R Axis 23 degrees    T Axis 57 degrees    < end of copied text >      < from: US Duplex Venous Upper Ext Ltd, Right (05.15.19 @ 11:26) >  EXAM:  US DPLX UPR EXT VEINS LTD RT                          PROCEDURE DATE:  05/15/2019      INTERPRETATION:  CLINICAL INFORMATION: Right upper extremity swelling.    COMPARISON: None available.    TECHNIQUE: Duplex sonography of the RIGHT UPPER extremity with color and   spectral Doppler, with and without compression.      FINDINGS:    The right internal jugular, subclavian, axillary, brachial, basilic and   cephalic veins are patent and compressible where applicable.     Doppler examination shows normal spontaneous and phasic flow.    IMPRESSION:     No evidence of right upper extremity deep venous thrombosis.    < end of copied text >    MR foot   IMPRESSION:   First distal metatarsal osteomyelitis, with enhancement in the   surrounding soft tissues. Soft tissue wound/ulceration, no focal soft   tissue abscess.  Chronic appearing irregularity at the distal fifth metatarsal, without   acute bone marrow edema enhancement.  Chronic-appearing deformity of the distal second metatarsal, with   dislocation at the second MTP joint.    < from: Xray Chest 1 View- PORTABLE-Routine (05.12.19 @ 05:45) >    EXAM:  XR CHEST PORTABLE ROUTINE 1V                          PROCEDURE DATE:  05/12/2019      INTERPRETATION:  Clinical information: Productive cough    Portable study, 5:39 AM    Comparison study dated 5/10/2019, 10:29 PM.    Cardiac monitorleads present. A nasogastric tube is present, the distal   end of the tube enters the stomach in the left upper quadrant.    Hazy appearance the lung bases likely related to a combination of   vascular shadows and overlying soft tissues. However recommend a   follow-up study, PA and lateral views of the chest to better define the   lung bases.    Heart size upper limits of normal magnified secondary to portable   technique. No acute osseous abnormalities.    IMPRESSION: See above report    LETY LONDONO M.D.,ATTENDING RADIOLOGIST  This document has been electronically signed. May 12 2019 12:53PM      < end of copied text >

## 2019-06-02 NOTE — PROGRESS NOTE ADULT - SUBJECTIVE AND OBJECTIVE BOX
Patient is a 48y old  Male who presents with a chief complaint of perforated stomach ulcer (01 Jun 2019 13:17)  feels well, without distress      INTERVAL HPI/OVERNIGHT EVENTS:  T(C): 36.7 (06-02-19 @ 04:45), Max: 37.2 (06-01-19 @ 19:25)  HR: 55 (06-02-19 @ 04:45) (54 - 64)  BP: 117/66 (06-02-19 @ 04:45) (100/65 - 129/75)  RR: 18 (06-02-19 @ 04:45) (18 - 18)  SpO2: 98% (06-02-19 @ 04:45) (93% - 98%)  Wt(kg): --  I&O's Summary    01 Jun 2019 07:01  -  02 Jun 2019 07:00  --------------------------------------------------------  IN: 2370 mL / OUT: 3190 mL / NET: -820 mL        LABS:                        7.3    6.25  )-----------( 375      ( 02 Jun 2019 06:40 )             23.3     06-02    143  |  107  |  13  ----------------------------<  117<H>  3.9   |  29  |  1.10    Ca    8.0<L>      02 Jun 2019 06:40          CAPILLARY BLOOD GLUCOSE      POCT Blood Glucose.: 122 mg/dL (02 Jun 2019 08:04)  POCT Blood Glucose.: 239 mg/dL (01 Jun 2019 21:26)  POCT Blood Glucose.: 220 mg/dL (01 Jun 2019 17:08)  POCT Blood Glucose.: 130 mg/dL (01 Jun 2019 11:36)            MEDICATIONS  (STANDING):  amiodarone    Tablet 200 milliGRAM(s) Oral daily  dextrose 5%. 1000 milliLiter(s) (50 mL/Hr) IV Continuous <Continuous>  dextrose 50% Injectable 12.5 Gram(s) IV Push once  dextrose 50% Injectable 25 Gram(s) IV Push once  docusate sodium 100 milliGRAM(s) Oral three times a day  enoxaparin Injectable 40 milliGRAM(s) SubCutaneous at bedtime  insulin glargine Injectable (LANTUS) 10 Unit(s) SubCutaneous at bedtime  insulin lispro (HumaLOG) corrective regimen sliding scale   SubCutaneous Before meals and at bedtime  insulin lispro Injectable (HumaLOG) 3 Unit(s) SubCutaneous three times a day before meals  nicotine -  14 mG/24Hr(s) Patch 1 patch Transdermal daily  pantoprazole    Tablet 40 milliGRAM(s) Oral two times a day  polyethylene glycol 3350 17 Gram(s) Oral at bedtime  senna 2 Tablet(s) Oral at bedtime  tamsulosin 0.8 milliGRAM(s) Oral at bedtime    MEDICATIONS  (PRN):  acetaminophen   Tablet .. 650 milliGRAM(s) Oral every 6 hours PRN Mild Pain (1 - 3)  aluminum hydroxide/magnesium hydroxide/simethicone Suspension 30 milliLiter(s) Oral every 4 hours PRN Dyspepsia  dextrose 40% Gel 15 Gram(s) Oral once PRN Blood Glucose LESS THAN 70 milliGRAM(s)/deciliter  glucagon  Injectable 1 milliGRAM(s) IntraMuscular once PRN Glucose LESS THAN 70 milligrams/deciliter  nitroglycerin     SubLingual 0.4 milliGRAM(s) SubLingual every 5 minutes PRN Chest Pain  ondansetron Injectable 4 milliGRAM(s) IV Push every 8 hours PRN Nausea and/or Vomiting  oxyCODONE    5 mG/acetaminophen 325 mG 1 Tablet(s) Oral every 4 hours PRN Moderate Pain (4 - 6)  oxyCODONE    5 mG/acetaminophen 325 mG 2 Tablet(s) Oral every 6 hours PRN Severe Pain (7 - 10)      REVIEW OF SYSTEMS:  CONSTITUTIONAL: No fever, weight loss, or fatigue  EYES: No eye pain, visual disturbances, or discharge  ENMT:  No difficulty hearing, tinnitus, vertigo; No sinus or throat pain  NECK: No pain or stiffness  RESPIRATORY: No cough, wheezing, chills or hemoptysis; No shortness of breath  CARDIOVASCULAR: No chest pain, palpitations, dizziness, or leg swelling  GASTROINTESTINAL: No abdominal or epigastric pain. No nausea, vomiting, or hematemesis; No diarrhea or constipation. No melena or hematochezia.  GENITOURINARY: No dysuria, frequency, hematuria, or incontinence  NEUROLOGICAL: No headaches, memory loss, loss of strength, numbness, or tremors  SKIN: No itching, burning, rashes, or lesions   LYMPH NODES: No enlarged glands  ENDOCRINE: No heat or cold intolerance; No hair loss  MUSCULOSKELETAL: No joint pain or swelling; No muscle, back, or extremity pain  PSYCHIATRIC: No depression, anxiety, mood swings, or difficulty sleeping  HEME/LYMPH: No easy bruising, or bleeding gums  ALLERY AND IMMUNOLOGIC: No hives or eczema    RADIOLOGY & ADDITIONAL TESTS:    Imaging Personally Reviewed:  [ ] YES  [ ] NO    Consultant(s) Notes Reviewed:  [ ] YES  [ ] NO    PHYSICAL EXAM:  GENERAL: NAD,  HEAD:  Atraumatic, Normocephalic  EYES: EOMI, PERRLA, conjunctiva and sclera clear  ENMT: No tonsillar erythema, exudates, or enlargement; Moist mucous membranes, edentulous  NECK: Supple, No JVD, Normal thyroid  NERVOUS SYSTEM:  Alert & Oriented X3, Good concentration; Motor Strength 5/5 B/L upper and lower extremities; DTRs 2+ intact and symmetric  CHEST/LUNG: Clear to percussion bilaterally; No rales, rhonchi, wheezing, or rubs  HEART: Regular rate and rhythm; No murmurs, rubs, or gallops  ABDOMEN: Soft, Nontender, Nondistended; Bowel sounds present  EXTREMITIES:  2+ Peripheral Pulses, No clubbing, cyanosis, or edema  LYMPH: No lymphadenopathy noted  SKIN: No rashes or lesions    Care Discussed with Consultants/Other Providers [ ] YES  [ ] NO

## 2019-06-03 LAB
ANION GAP SERPL CALC-SCNC: 4 MMOL/L — LOW (ref 5–17)
BUN SERPL-MCNC: 13 MG/DL — SIGNIFICANT CHANGE UP (ref 7–23)
CALCIUM SERPL-MCNC: 8.4 MG/DL — LOW (ref 8.5–10.1)
CHLORIDE SERPL-SCNC: 107 MMOL/L — SIGNIFICANT CHANGE UP (ref 96–108)
CO2 SERPL-SCNC: 32 MMOL/L — HIGH (ref 22–31)
CREAT SERPL-MCNC: 1.3 MG/DL — SIGNIFICANT CHANGE UP (ref 0.5–1.3)
GLUCOSE SERPL-MCNC: 117 MG/DL — HIGH (ref 70–99)
HCT VFR BLD CALC: 27.2 % — LOW (ref 39–50)
HGB BLD-MCNC: 8.5 G/DL — LOW (ref 13–17)
MCHC RBC-ENTMCNC: 30.4 PG — SIGNIFICANT CHANGE UP (ref 27–34)
MCHC RBC-ENTMCNC: 31.3 GM/DL — LOW (ref 32–36)
MCV RBC AUTO: 97.1 FL — SIGNIFICANT CHANGE UP (ref 80–100)
NRBC # BLD: 0 /100 WBCS — SIGNIFICANT CHANGE UP (ref 0–0)
PLATELET # BLD AUTO: 347 K/UL — SIGNIFICANT CHANGE UP (ref 150–400)
POTASSIUM SERPL-MCNC: 4.3 MMOL/L — SIGNIFICANT CHANGE UP (ref 3.5–5.3)
POTASSIUM SERPL-SCNC: 4.3 MMOL/L — SIGNIFICANT CHANGE UP (ref 3.5–5.3)
RBC # BLD: 2.8 M/UL — LOW (ref 4.2–5.8)
RBC # FLD: 14 % — SIGNIFICANT CHANGE UP (ref 10.3–14.5)
SODIUM SERPL-SCNC: 143 MMOL/L — SIGNIFICANT CHANGE UP (ref 135–145)
WBC # BLD: 5.86 K/UL — SIGNIFICANT CHANGE UP (ref 3.8–10.5)
WBC # FLD AUTO: 5.86 K/UL — SIGNIFICANT CHANGE UP (ref 3.8–10.5)

## 2019-06-03 PROCEDURE — 99232 SBSQ HOSP IP/OBS MODERATE 35: CPT

## 2019-06-03 RX ORDER — DOCUSATE SODIUM 100 MG
1 CAPSULE ORAL
Qty: 90 | Refills: 0
Start: 2019-06-03 | End: 2019-07-02

## 2019-06-03 RX ORDER — INSULIN LISPRO 100/ML
3 VIAL (ML) SUBCUTANEOUS
Qty: 1 | Refills: 0
Start: 2019-06-03 | End: 2019-07-02

## 2019-06-03 RX ORDER — POLYETHYLENE GLYCOL 3350 17 G/17G
17 POWDER, FOR SOLUTION ORAL
Qty: 0 | Refills: 0 | DISCHARGE
Start: 2019-06-03

## 2019-06-03 RX ORDER — SENNA PLUS 8.6 MG/1
2 TABLET ORAL
Qty: 60 | Refills: 0
Start: 2019-06-03 | End: 2019-07-02

## 2019-06-03 RX ORDER — ONDANSETRON 8 MG/1
0 TABLET, FILM COATED ORAL
Qty: 0 | Refills: 0 | DISCHARGE
Start: 2019-06-03

## 2019-06-03 RX ORDER — NITROGLYCERIN 6.5 MG
0.5 CAPSULE, EXTENDED RELEASE ORAL
Qty: 0 | Refills: 0 | DISCHARGE
Start: 2019-06-03

## 2019-06-03 RX ORDER — AMIODARONE HYDROCHLORIDE 400 MG/1
1 TABLET ORAL
Qty: 0 | Refills: 0 | DISCHARGE
Start: 2019-06-03

## 2019-06-03 RX ORDER — PANTOPRAZOLE SODIUM 20 MG/1
1 TABLET, DELAYED RELEASE ORAL
Qty: 0 | Refills: 0 | DISCHARGE
Start: 2019-06-03

## 2019-06-03 RX ORDER — NICOTINE POLACRILEX 2 MG
1 GUM BUCCAL
Qty: 0 | Refills: 0 | DISCHARGE
Start: 2019-06-03

## 2019-06-03 RX ORDER — ENOXAPARIN SODIUM 100 MG/ML
40 INJECTION SUBCUTANEOUS
Qty: 0 | Refills: 0 | DISCHARGE
Start: 2019-06-03

## 2019-06-03 RX ORDER — TAMSULOSIN HYDROCHLORIDE 0.4 MG/1
2 CAPSULE ORAL
Qty: 0 | Refills: 0 | DISCHARGE
Start: 2019-06-03

## 2019-06-03 RX ADMIN — AMIODARONE HYDROCHLORIDE 200 MILLIGRAM(S): 400 TABLET ORAL at 05:24

## 2019-06-03 RX ADMIN — INSULIN GLARGINE 10 UNIT(S): 100 INJECTION, SOLUTION SUBCUTANEOUS at 22:26

## 2019-06-03 RX ADMIN — ENOXAPARIN SODIUM 40 MILLIGRAM(S): 100 INJECTION SUBCUTANEOUS at 22:09

## 2019-06-03 RX ADMIN — PANTOPRAZOLE SODIUM 40 MILLIGRAM(S): 20 TABLET, DELAYED RELEASE ORAL at 05:24

## 2019-06-03 RX ADMIN — Medication 100 MILLIGRAM(S): at 05:24

## 2019-06-03 RX ADMIN — Medication 3 UNIT(S): at 17:57

## 2019-06-03 RX ADMIN — PANTOPRAZOLE SODIUM 40 MILLIGRAM(S): 20 TABLET, DELAYED RELEASE ORAL at 17:56

## 2019-06-03 RX ADMIN — Medication 2: at 17:48

## 2019-06-03 RX ADMIN — Medication 1 PATCH: at 19:13

## 2019-06-03 RX ADMIN — Medication 1 PATCH: at 12:44

## 2019-06-03 RX ADMIN — ONDANSETRON 4 MILLIGRAM(S): 8 TABLET, FILM COATED ORAL at 12:54

## 2019-06-03 RX ADMIN — Medication 3 UNIT(S): at 12:44

## 2019-06-03 RX ADMIN — POLYETHYLENE GLYCOL 3350 17 GRAM(S): 17 POWDER, FOR SOLUTION ORAL at 22:08

## 2019-06-03 RX ADMIN — Medication 100 MILLIGRAM(S): at 22:08

## 2019-06-03 RX ADMIN — OXYCODONE AND ACETAMINOPHEN 1 TABLET(S): 5; 325 TABLET ORAL at 12:30

## 2019-06-03 RX ADMIN — ONDANSETRON 4 MILLIGRAM(S): 8 TABLET, FILM COATED ORAL at 22:32

## 2019-06-03 RX ADMIN — TAMSULOSIN HYDROCHLORIDE 0.8 MILLIGRAM(S): 0.4 CAPSULE ORAL at 22:07

## 2019-06-03 RX ADMIN — OXYCODONE AND ACETAMINOPHEN 1 TABLET(S): 5; 325 TABLET ORAL at 12:54

## 2019-06-03 RX ADMIN — SENNA PLUS 2 TABLET(S): 8.6 TABLET ORAL at 22:08

## 2019-06-03 RX ADMIN — Medication 100 MILLIGRAM(S): at 15:49

## 2019-06-03 RX ADMIN — Medication 3 UNIT(S): at 08:49

## 2019-06-03 RX ADMIN — Medication 2: at 22:24

## 2019-06-03 RX ADMIN — OXYCODONE AND ACETAMINOPHEN 2 TABLET(S): 5; 325 TABLET ORAL at 22:38

## 2019-06-03 NOTE — PROGRESS NOTE ADULT - PROBLEM SELECTOR PLAN 1
cont lantus 10 units daily  cont humalog 3 units 3x/day before meals  cont humalog scale coverage qac/qhs  cont cons cho diet  goal bg 100-180 in hosp setting

## 2019-06-03 NOTE — PROGRESS NOTE ADULT - SUBJECTIVE AND OBJECTIVE BOX
49yo male seen bedside during podiatry rounds. s/p left hallux amputation and metatarsal head resection. Awaiting discharge    Vital Signs Last 24 Hrs  T(C): 37 (03 Jun 2019 12:19), Max: 37.4 (03 Jun 2019 04:33)  T(F): 98.6 (03 Jun 2019 12:19), Max: 99.4 (03 Jun 2019 04:33)  HR: 62 (03 Jun 2019 12:19) (53 - 92)  BP: 115/70 (03 Jun 2019 12:19) (103/61 - 117/71)  BP(mean): --  RR: 18 (03 Jun 2019 12:19) (18 - 18)  SpO2: 94% (03 Jun 2019 12:19) (91% - 99%)    CBC Full  -  ( 03 Jun 2019 06:14 )  WBC Count : 5.86 K/uL  RBC Count : 2.80 M/uL  Hemoglobin : 8.5 g/dL  Hematocrit : 27.2 %  Platelet Count - Automated : 347 K/uL  Mean Cell Volume : 97.1 fl  Mean Cell Hemoglobin : 30.4 pg  Mean Cell Hemoglobin Concentration : 31.3 gm/dL  Auto Neutrophil # : x  Auto Lymphocyte # : x  Auto Monocyte # : x  Auto Eosinophil # : x  Auto Basophil # : x  Auto Neutrophil % : x  Auto Lymphocyte % : x  Auto Monocyte % : x  Auto Eosinophil % : x  Auto Basophil % : x    06-03    143  |  107  |  13  ----------------------------<  117<H>  4.3   |  32<H>  |  1.30    Ca    8.4<L>      03 Jun 2019 06:14            Objective   Vascular: DP/PT palpable, CFT<3, Temp gradient warm to cool pedal hair absent, no edema present at this time   Derm:left foot sutures intact no dehiscence post surgical erythema no edema   Musc: hammer toe deformity 2-5 with overlapping of digits, pes planus, HAV deformity   Nuero: protective sensation absent     Patient seen and evaluated  OR Cx and Path reviewed    Wound cleaned with NS and dressed with DSD   Patient to continue abx based on ID recommendation     Patient is podiatrically stable for discharge   Patient to ambulate with surgical shoe and partial weight bearing to heel   Patient to continue abx based on ID recommendations   Patient will keep dressings clean dry and intact until follow up in one week with Dr Hale. 49yo male seen bedside during podiatry rounds. s/p left hallux amputation and metatarsal head resection. Awaiting discharge    Vital Signs Last 24 Hrs  T(C): 37 (03 Jun 2019 12:19), Max: 37.4 (03 Jun 2019 04:33)  T(F): 98.6 (03 Jun 2019 12:19), Max: 99.4 (03 Jun 2019 04:33)  HR: 62 (03 Jun 2019 12:19) (53 - 92)  BP: 115/70 (03 Jun 2019 12:19) (103/61 - 117/71)  BP(mean): --  RR: 18 (03 Jun 2019 12:19) (18 - 18)  SpO2: 94% (03 Jun 2019 12:19) (91% - 99%)    CBC Full  -  ( 03 Jun 2019 06:14 )  WBC Count : 5.86 K/uL  RBC Count : 2.80 M/uL  Hemoglobin : 8.5 g/dL  Hematocrit : 27.2 %  Platelet Count - Automated : 347 K/uL  Mean Cell Volume : 97.1 fl  Mean Cell Hemoglobin : 30.4 pg  Mean Cell Hemoglobin Concentration : 31.3 gm/dL  Auto Neutrophil # : x  Auto Lymphocyte # : x  Auto Monocyte # : x  Auto Eosinophil # : x  Auto Basophil # : x  Auto Neutrophil % : x  Auto Lymphocyte % : x  Auto Monocyte % : x  Auto Eosinophil % : x  Auto Basophil % : x    06-03    143  |  107  |  13  ----------------------------<  117<H>  4.3   |  32<H>  |  1.30    Ca    8.4<L>      03 Jun 2019 06:14            Objective   Vascular: DP/PT palpable, CFT<3, Temp gradient warm to cool pedal hair absent, no edema present at this time   Derm:left foot sutures intact no dehiscence post surgical erythema no edema   Musc: hammer toe deformity 2-5 with overlapping of digits, pes planus, HAV deformity   Nuero: protective sensation absent

## 2019-06-03 NOTE — PROGRESS NOTE ADULT - ASSESSMENT
48 year old man, admitted May 10th with the had sudden onset at 7 PM of abdominal pain which was found to be due to a perforated ulcer.    Perforated Antral Gastric Ulcer, s/p Emergent Exp-lap Oomentopexy and plication 5/11  . Found to have new onset afib with elevated troponin post-operatively indicative of NSTEMI, started on Amiodarone and converted to sinus rhythm, then with left foot osteo OR 5/28 with amputation 'left foot hallux amputation and partial ray resection'.

## 2019-06-03 NOTE — PROGRESS NOTE ADULT - PROBLEM SELECTOR PLAN 5
remains resolved at present  ekg/tele nsr, without st or t changes. CE consistent with possible NSTEMI  sublingual nitro prn now   appreciate cardio rec -remains resolved at present  ekg/tele nsr, without st or t changes. CE consistent with possible NSTEMI  sublingual nitro prn now   appreciate cardio rec

## 2019-06-03 NOTE — PROGRESS NOTE ADULT - ASSESSMENT
48 year old male with PMH of  HTN and DM2, who presented with Perforated Antral Gastric Ulcer, s/p Emergent Exp-lap Oomentopexy and plication 5/11. Found to have new onset afib with elevated troponin post-operatively indicative of NSTEMI, started on Amiodarone and converted to sinus rhythm, left foot osteo s/p debridement OR 5/28. post op day 6

## 2019-06-03 NOTE — CHART NOTE - NSCHARTNOTEFT_GEN_A_CORE
Assessment: Pt seen for LOS follow-up. As per chart pt is 48 year old male with a PMH of  HTN and DM2, who presented with Perforated Antral Gastric Ulcer, s/p Emergent Exp-lap Oomentopexy and plication 5/11. Found to have new onset afib with elevated troponin post-operatively indicative of NSTEMI, started on Amiodarone and converted to sinus rhythm, left foot osteo s/p debridement OR 5/28. post op day 6. Pt reports currently good appetite and PO intake, per chart is consuming 100% of meals in house.     Factors impacting intake: [ ] none [ ] nausea  [ ] vomiting [ ] diarrhea [ ] constipation  [ ]chewing problems [ ] swallowing issues  [ ] other:     Diet Presciption: Diet, Regular:   Consistent Carbohydrate {Evening Snack}  DASH/TLC {Sodium & Cholesterol Restricted} (05-28-19 @ 17:09)    Intake:     Current Weight: Weight (kg): 81.6 (05-28 @ 15:07)  % Weight Change    Pertinent Medications: MEDICATIONS  (STANDING):  amiodarone    Tablet 200 milliGRAM(s) Oral daily  dextrose 5%. 1000 milliLiter(s) (50 mL/Hr) IV Continuous <Continuous>  dextrose 50% Injectable 12.5 Gram(s) IV Push once  dextrose 50% Injectable 25 Gram(s) IV Push once  docusate sodium 100 milliGRAM(s) Oral three times a day  enoxaparin Injectable 40 milliGRAM(s) SubCutaneous at bedtime  insulin glargine Injectable (LANTUS) 10 Unit(s) SubCutaneous at bedtime  insulin lispro (HumaLOG) corrective regimen sliding scale   SubCutaneous Before meals and at bedtime  insulin lispro Injectable (HumaLOG) 3 Unit(s) SubCutaneous three times a day before meals  nicotine -  14 mG/24Hr(s) Patch 1 patch Transdermal daily  pantoprazole    Tablet 40 milliGRAM(s) Oral two times a day  polyethylene glycol 3350 17 Gram(s) Oral at bedtime  senna 2 Tablet(s) Oral at bedtime  tamsulosin 0.8 milliGRAM(s) Oral at bedtime    MEDICATIONS  (PRN):  acetaminophen   Tablet .. 650 milliGRAM(s) Oral every 6 hours PRN Mild Pain (1 - 3)  aluminum hydroxide/magnesium hydroxide/simethicone Suspension 30 milliLiter(s) Oral every 4 hours PRN Dyspepsia  dextrose 40% Gel 15 Gram(s) Oral once PRN Blood Glucose LESS THAN 70 milliGRAM(s)/deciliter  glucagon  Injectable 1 milliGRAM(s) IntraMuscular once PRN Glucose LESS THAN 70 milligrams/deciliter  nitroglycerin     SubLingual 0.4 milliGRAM(s) SubLingual every 5 minutes PRN Chest Pain  ondansetron Injectable 4 milliGRAM(s) IV Push every 8 hours PRN Nausea and/or Vomiting  oxyCODONE    5 mG/acetaminophen 325 mG 1 Tablet(s) Oral every 4 hours PRN Moderate Pain (4 - 6)  oxyCODONE    5 mG/acetaminophen 325 mG 2 Tablet(s) Oral every 6 hours PRN Severe Pain (7 - 10)    Pertinent Labs: 06-03 Na143 mmol/L Glu 117 mg/dL<H> K+ 4.3 mmol/L Cr  1.30 mg/dL BUN 13 mg/dL 05-12 [id number] 8.0 %<H> 05-17 Chol 105 mg/dL LDL 55 mg/dL HDL 22 mg/dL<L> Trig 140 mg/dL     CAPILLARY BLOOD GLUCOSE      POCT Blood Glucose.: 103 mg/dL (03 Jun 2019 08:03)  POCT Blood Glucose.: 189 mg/dL (02 Jun 2019 21:11)  POCT Blood Glucose.: 171 mg/dL (02 Jun 2019 17:02)  POCT Blood Glucose.: 163 mg/dL (02 Jun 2019 11:53)    Skin:     Estimated Needs:   [ ] no change since previous assessment  [ ] recalculated:     Previous Nutrition Diagnosis:   [ ] Inadequate Energy Intake [ ]Inadequate Oral Intake [ ] Excessive Energy Intake   [ ] Underweight [ ] Increased Nutrient Needs [ ] Overweight/Obesity   [ ] Altered GI Function [ ] Unintended Weight Loss [ ] Food & Nutrition Related Knowledge Deficit [ ] Malnutrition     Nutrition Diagnosis is [ ] ongoing  [ ] resolved [ ] not applicable     New Nutrition Diagnosis: [ ] not applicable       Interventions:   Recommend  [ ] Change Diet To:  [ ] Nutrition Supplement  [ ] Nutrition Support  [ ] Other:     Monitoring and Evaluation:   [ ] PO intake [ x ] Tolerance to diet prescription [ x ] weights [ x ] labs[ x ] follow up per protocol  [ ] other: Assessment: Pt seen for LOS follow-up. As per chart pt is 48 year old male with a PMH of  HTN and DM2, who presented with Perforated Antral Gastric Ulcer, s/p Emergent Exp-lap Oomentopexy and plication 5/11. Found to have new onset afib with elevated troponin post-operatively indicative of NSTEMI, started on Amiodarone and converted to sinus rhythm, left foot osteo s/p debridement OR 5/28. post op day 6. Pt reports currently good appetite and PO intake, per chart is consuming 100% of meals in house. Denies any nausea, vomiting, diarrhea, constipation, last BM 6/1.     Factors impacting intake: [x ] none    Diet Presciption: Diet, Regular:   Consistent Carbohydrate {Evening Snack}  DASH/TLC {Sodium & Cholesterol Restricted} (05-28-19 @ 17:09)    Intake: good     Current Weight: (5/29) 201lbs  Previous Weigh: (5/28) 203lbs   0.98% Weight Change- weight change likely due to fluid shifts, recommend to obtain pt's updated body weight     Pertinent Medications: MEDICATIONS  (STANDING):  amiodarone    Tablet 200 milliGRAM(s) Oral daily  dextrose 5%. 1000 milliLiter(s) (50 mL/Hr) IV Continuous <Continuous>  dextrose 50% Injectable 12.5 Gram(s) IV Push once  dextrose 50% Injectable 25 Gram(s) IV Push once  docusate sodium 100 milliGRAM(s) Oral three times a day  enoxaparin Injectable 40 milliGRAM(s) SubCutaneous at bedtime  insulin glargine Injectable (LANTUS) 10 Unit(s) SubCutaneous at bedtime  insulin lispro (HumaLOG) corrective regimen sliding scale   SubCutaneous Before meals and at bedtime  insulin lispro Injectable (HumaLOG) 3 Unit(s) SubCutaneous three times a day before meals  nicotine -  14 mG/24Hr(s) Patch 1 patch Transdermal daily  pantoprazole    Tablet 40 milliGRAM(s) Oral two times a day  polyethylene glycol 3350 17 Gram(s) Oral at bedtime  senna 2 Tablet(s) Oral at bedtime  tamsulosin 0.8 milliGRAM(s) Oral at bedtime    MEDICATIONS  (PRN):  acetaminophen   Tablet .. 650 milliGRAM(s) Oral every 6 hours PRN Mild Pain (1 - 3)  aluminum hydroxide/magnesium hydroxide/simethicone Suspension 30 milliLiter(s) Oral every 4 hours PRN Dyspepsia  dextrose 40% Gel 15 Gram(s) Oral once PRN Blood Glucose LESS THAN 70 milliGRAM(s)/deciliter  glucagon  Injectable 1 milliGRAM(s) IntraMuscular once PRN Glucose LESS THAN 70 milligrams/deciliter  nitroglycerin     SubLingual 0.4 milliGRAM(s) SubLingual every 5 minutes PRN Chest Pain  ondansetron Injectable 4 milliGRAM(s) IV Push every 8 hours PRN Nausea and/or Vomiting  oxyCODONE    5 mG/acetaminophen 325 mG 1 Tablet(s) Oral every 4 hours PRN Moderate Pain (4 - 6)  oxyCODONE    5 mG/acetaminophen 325 mG 2 Tablet(s) Oral every 6 hours PRN Severe Pain (7 - 10)    Pertinent Labs: 06-03 Na143 mmol/L Glu 117 mg/dL<H> K+ 4.3 mmol/L Cr  1.30 mg/dL BUN 13 mg/dL, Hgb 8.5, Hct 27.2, Calcium 8.4, 05-12 [id number] 8.0 %<H> 05-17 Chol 105 mg/dL LDL 55 mg/dL HDL 22 mg/dL<L> Trig 140 mg/dL     CAPILLARY BLOOD GLUCOSE    POCT Blood Glucose.: 103 mg/dL (03 Jun 2019 08:03)  POCT Blood Glucose.: 189 mg/dL (02 Jun 2019 21:11)  POCT Blood Glucose.: 171 mg/dL (02 Jun 2019 17:02)  POCT Blood Glucose.: 163 mg/dL (02 Jun 2019 11:53)    Skin: + 1 b/l feet edema     Estimated Needs:   [x ] no change since previous assessment  [ ] recalculated:     Previous Nutrition Diagnosis:   [x ] Altered GI Function    Nutrition Diagnosis is [x ] resolved    New Nutrition Diagnosis: [x ] not applicable       Interventions: Continue with current Consistent CHO w/evening snack, DASH/TLC diet   Recommend  [ ] Change Diet To:  [ ] Nutrition Supplement  [ ] Nutrition Support  [ x] Other:   1) Pt encouraged to continue good PO intake  2) Recommend to obtain pt's updated body weight  3) RD to remain available     Monitoring and Evaluation:   [x ] PO intake [ x ] Tolerance to diet prescription [ x ] weights [ x ] labs[ x ] follow up per protocol  [ ] other:

## 2019-06-03 NOTE — PROGRESS NOTE ADULT - ASSESSMENT
48 year old male with HTN and DM2, who presented with Perforated Antral Gastric Ulcer, POD 5 s/p Emergent Exp-lap Oomentopexy and plication. Tolerated procedure well, however, had Afib with RVR  postop, spontaneously to NSR which was new. He had an elevated troponin possibly suggestive of non-ST segment elevation myocardial infarction/demand ischemia. He has no known history of coronary artery disease    Afib  - Per flow sheet, HR has been controlled  - Continue Amio, plan is to stop Amio as an outpatient and consider BB instead  - TTE with normal LV function, moderate MR   - No need for long term AC as Afib was short lived   - Maintain  K>4, Mg>2  - Recommend an event monitor as outpatient.    Anemia  - H&H today 8.5/28.2 he remains hemodynamically stable with no overt signs of bleeding  - As per primary team  - Off full dose AC    Left foot OM  - MRI with left foot osteomyelitis and now S/P left hallux resection with debridement tolerated procedure w/o cardiac compromise   - ID following.  Planned for long term Abx    - All other workup per Primary. Will continue to follow     Bharti Cheney DNP, ANP-c  Cardiology

## 2019-06-03 NOTE — PROGRESS NOTE ADULT - SUBJECTIVE AND OBJECTIVE BOX
[facility], Division of Infectious Diseases  FRANCIS [facility] A. [place]  [phone]  Name: SARAH MORRISON  Age: 48y  Gender: Male  MRN: [id number]    Interval History--  Notes reviewed  no complaints    Past Medical History--  Diabetes mellitus with no complication      For details regarding the patient's social history, family history, and other miscellaneous elements, please refer the initial infectious diseases consultation and/or the admitting history and physical examination for this admission.    Allergies    No Known Allergies    Intolerances        Medications--  Antibiotics:    Immunologic:    Other:  acetaminophen   Tablet .. PRN  aluminum hydroxide/magnesium hydroxide/simethicone Suspension PRN  amiodarone    Tablet  dextrose 40% Gel PRN  dextrose 5%.  dextrose 50% Injectable  dextrose 50% Injectable  docusate sodium  enoxaparin Injectable  glucagon  Injectable PRN  insulin glargine Injectable (LANTUS)  insulin lispro (HumaLOG) corrective regimen sliding scale  insulin lispro Injectable (HumaLOG)  nicotine -  14 mG/24Hr(s) Patch  nitroglycerin     SubLingual PRN  ondansetron Injectable PRN  oxyCODONE    5 mG/acetaminophen 325 mG PRN  oxyCODONE    5 mG/acetaminophen 325 mG PRN  pantoprazole    Tablet  polyethylene glycol 3350  senna  tamsulosin      Review of Systems--  A 10-point review of systems was obtained.     unchanged  previously noted.    Physical Examination--  Vital Signs: T(F): 98.6 (06-03-19 @ 12:19), Max: 99.4 (06-03-19 @ 04:33)  HR: 62 (06-03-19 @ 12:19)  BP: 115/70 (06-03-19 @ 12:19)  RR: 18 (06-03-19 @ 12:19)  SpO2: 94% (06-03-19 @ 12:19)  Wt(kg): --  General: Nontoxic-appearing Male in no acute distress.  HEENT: AT/NC.  Anicteric. Conjunctiva pink and moist. Oropharynx clear. Dentition fair.  Neck: Not rigid. No sense of mass.  Nodes: None palpable.  Lungs: Clear bilaterally without rales, wheezing or rhonchi  Heart: Regular rate and rhythm. No Murmur.   Abdomen: Bowel sounds present and normoactive. Soft. Nondistended. Nontender.   Extremities: No cyanosis or clubbing. in boots wrapped  Skin: Warm. Dry. Good turgor. No rash. No vasculitic stigmata.  Psychiatric: Appropriate affect and mood for situation.         Laboratory Studies--  CBC                        8.5    5.86  )-----------( 347      ( 03 Jun 2019 06:14 )             27.2       Chemistries  06-03    143  |  107  |  13  ----------------------------<  117<H>  4.3   |  32<H>  |  1.30    Ca    8.4<L>      03 Jun 2019 06:14        Culture Data    Culture - Tissue with Gram Stain (collected 29 May 2019 01:11)  Source: .Tissue Other, bone left 1st metatarsal head  Gram Stain (29 May 2019 06:01):    Rare polymorphonuclear leukocytes seen per low power field    No organisms seen per oil power field  Final Report (02 Jun 2019 21:18):    Rare Staphylococcus aureus    Few Coag Negative Staphylococcus  Organism: Staphylococcus aureus  Coag Negative Staphylococcus (02 Jun 2019 21:18)  Organism: Coag Negative Staphylococcus (02 Jun 2019 21:18)  Organism: Staphylococcus aureus (02 Jun 2019 21:18)    Culture - Tissue with Gram Stain (collected 29 May 2019 01:11)  Source: .Tissue Other, bone left great toe  Gram Stain (29 May 2019 06:00):    No polymorphonuclear cells seen per low power field    No organisms seen per oil power field  Final Report (02 Jun 2019 19:13):    No growth at 5 days            Surgical Pathology Report (05.28.19 @ 16:05)    Surgical Pathology Report:   ACCESSION No:  30 [id number]    SARAH MORRISON 2        Surgical Final Report          Final Diagnosis  1: Left great toe:  Epidermal inclusion cyst of skin.  No evidence of acute osteomyelitis of bone.  Viable skin margin andviable bone margin.    2. Left 1st metatarsal head:  Portion of bone with attached fibrous tissue.  No evidence of acute osteomyelitis.    3. Left 1st metatarsal bone clean proximal margin:  Portion of bone with attached fibrous tissue.  No evidence of acute osteomyelitis.    4. Diabetic ulcer left foot:  Benign skin with ulceration, acute inflammation, and reactive  change.  The acute inflammation involves peripheral skin margin.    Verified by: Amira Villeda MD  (Electronic Signature)  Reported on:05/31/19 12:16 EDT, [address] [place] [id number]  _________________________________________________________________    Clinical History  Procedure: Amputation left great toe    Specimen(s) Submitted  1-Left great toe  2-Left 1st metatarsal head  3-Left 1st metatarsal bone clean proximal margin  4-Diabetic ulcer left foot    Gross Description  The specimen is received in formalin and the specimen container  is labeled: Left great toe. It consists of an amputated digit  with smooth skin and bone margin of resection measuring 6.6 x 2.5  x 2.5 cm. The nail is present and shows tan-brown discoloration.  There is a tan-brown non-viable, friable appearing surfaced area  measuring overall 2.0 x 1.0, located 0.1 cm from the skin margin,  and involving the dorsal and medial surfaces. The plantar surface  shows a pinpoint depressed area measuring 0.1 cm in diameter. The  specimen is representatively submitted following selective  decalcification. Four cassettes.  A - skin margin and non-viable appearing area  B - bone margin  C - pinpoint depressed area            SARAH MORRISON                       2        Surgical Final Report          D - bone overlying depressed area    2. The specimen is received in formalin and the specimen  container is labeled: Left 1st metatarsal head bone. It consists  of two pieces of tan-brown bony tissue with attached gray-tan  soft tissue measuring in aggregate 4.0 x 3.0 x 1.7 cm. The bone  cut surface is yellow and spongy. Representative sections  following decalcification. Two cassettes.    3. The specimen is received in formalin and the specimen  container is labeled: Left 1st metatarsal bone clean proximal  margin. It consists of an ovoid piece of tan-brown bony tissue  with a smooth clean surgical cut measuring 2.0 x 1.7 x 0.3 cm.  Entirely submitted following decalcification. One cassette.    4. The specimen is received in formalin and the specimen  container is labeled: Diabetic ulcer left foot. It consists of an  ovoid portion of white-tan skin measuring 3.0 x 1.7 x 0.7 cm. The  skin surface shows a tan-brown ulcerated lesion measuring 2.0 x  1.7 cm, abutting the skin margin. The specimen is sectioned and  representatively submitted. One cassette.    In addition to other data that may appear on the specimen  containers, all labels have been inspected to confirm the  presence of the patient's name and date of birth.  DN 5/31/2019 8:10 AM    In addition to other data that may appear on the specimen  containers, all labels have been inspected to confirm the  presence of the patient's name and date of birth.  DN 5/31/2019 8:01 AM    Perioperative Diagnosis  Osteomyelitis left foot    Postoperative Diagnosis  Same

## 2019-06-03 NOTE — PROGRESS NOTE ADULT - PROBLEM SELECTOR PLAN 1
first distal metatarsal osteomyelitis noted on MRI.   - POD6 from L foot hallux amputation and partial ray resection (5/28).  blood and urine cx neg  tissue cx 1st metatarsal staph and coag neg staph   tissue cx great toe neg  fu surgical path- clear margins; abx discontinued  ID consult- my for abx choice and duration of treatment, fu rec  podiatry: [facility]  - pain control with percocet  dispo planning to [facility] first distal metatarsal osteomyelitis noted on MRI.   - POD 6 from L foot hallux amputation and partial ray resection (5/28).  - blood and urine cx neg  - tissue cx 1st metatarsal staph and coag neg staph   - tissue cx great toe- neg  fu surgical path- clear margins; abx discontinued  ID consult- my for abx choice and duration of treatment, fu rec  podiatry: [facility]  - pain control with percocet  -dispo planning to [facility]

## 2019-06-03 NOTE — PROGRESS NOTE ADULT - PROBLEM SELECTOR PLAN 4
importance of compliance discussed  -Continue Lantus 10 units qhs. humalog 3 with meals  -Hypoglycemia Protocol, SINA, Gogo AC&HS.  -Diet: Consistent Carbs w/ Evening Snack. importance of compliance discussed  -Continue Lantus 10 units qhs. humalog 3  with meals  -Hypoglycemia Protocol, SINA, Gogo AC&HS.  -Diet: Consistent Carbs w/ Evening Snack.

## 2019-06-03 NOTE — PROGRESS NOTE ADULT - PROBLEM SELECTOR PLAN 8
transfuse 1 unit prbc -transfuse 1 unit prbc yesterday. hemodynamically stable today. No clinical signs of bleeding

## 2019-06-03 NOTE — PHARMACOTHERAPY INTERVENTION NOTE - NSPHARMCOMMPTEDUFT
[id number], a1c 8.0 5/12/19, gluc 185 5/21/19 07:50. pt checks sugars tid and sees a general practioner to manage his DM. he tries to monitor what he eats and has an active life style.competent pt understood all meds from temporary provided medlist as of 5/21/19 09:23 with all indications explained: peridex cloth, nicoderm, dextrose , glucagon, lovenox, apap, protonix, amiodarone, dilaudid, insulin, oxycodone, flomax. pt aware his sugars are being controlled with humalog sliding scale tid ac and hs and is aware of hypoglycemia glucose <70 with dextrose antidote.verified [facility] [place] as outpatient pharmacy.
competent pt understood all meds from   darone, colace, senna, miralax, percocet, lovenox, zofran. verified outpatient pharmacy as [facility] [place] . type 2dm, a1c 8.0, gluc 103 6/3/19 8:02am. pt aware his sugar is being treated with humalog and lantus in the hospital with dextrose protocol antidote for hypoglycemia <70 and he is aware of the signs and symptoms. sees his primary for dm control, checks FS 3-4x a day, eats lean meats and veggies, and does physical activity

## 2019-06-03 NOTE — PROGRESS NOTE ADULT - ATTENDING COMMENTS
Patient seen and evaluated  Dressing changed to dry sterile dressing  OR cultures and path report noted  Follow ID recs  Patient is podiatrically stable for discharge   Patient to ambulate with surgical shoe and partial weight bearing to heel   Patient will keep dressings clean dry and intact until follow up in one week with me in the wound care center

## 2019-06-03 NOTE — PROGRESS NOTE ADULT - SUBJECTIVE AND OBJECTIVE BOX
Patient is a 48y old  Male who presents with a chief complaint of perforated stomach ulcer (03 Jun 2019 07:28)    48 year old male with PMH of  HTN and DM2, who presented with Perforated Antral Gastric Ulcer, s/p Emergent Exp-lap Oomentopexy and plication 5/11. Found to have new onset afib with elevated troponin post-operatively indicative of NSTEMI, started on Amiodarone and converted to sinus rhythm, now with left foot osteo plan for OR 5/28.    INTERVAL HPI/OVERNIGHT EVENTS:    T(C): 36.9 (06-03-19 @ 08:15), Max: 37.4 (06-03-19 @ 04:33)  HR: 56 (06-03-19 @ 08:15) (53 - 60)  BP: 103/61 (06-03-19 @ 08:15) (103/61 - 126/75)  RR: 18 (06-03-19 @ 08:15) (18 - 18)  SpO2: 91% (06-03-19 @ 08:15) (91% - 99%)  Wt(kg): --    I&O's Summary    02 Jun 2019 07:01  -  03 Jun 2019 07:00  --------------------------------------------------------  IN: 1015 mL / OUT: 3 mL / NET: 1012 mL        LABS:                        8.5    5.86  )-----------( 347      ( 03 Jun 2019 06:14 )             27.2     06-03    143  |  107  |  13  ----------------------------<  117<H>  4.3   |  32<H>  |  1.30    Ca    8.4<L>      03 Jun 2019 06:14        CAPILLARY BLOOD GLUCOSE      POCT Blood Glucose.: 103 mg/dL (03 Jun 2019 08:03)  POCT Blood Glucose.: 189 mg/dL (02 Jun 2019 21:11)  POCT Blood Glucose.: 171 mg/dL (02 Jun 2019 17:02)  POCT Blood Glucose.: 163 mg/dL (02 Jun 2019 11:53)          Culture - Tissue with Gram Stain (collected 29 May 2019 01:11)  Source: .Tissue Other, bone left 1st metatarsal head  Gram Stain (29 May 2019 06:01):    Rare polymorphonuclear leukocytes seen per low power field    No organisms seen per oil power field  Final Report (02 Jun 2019 21:18):    Rare Staphylococcus aureus    Few Coag Negative Staphylococcus  Organism: Staphylococcus aureus  Coag Negative Staphylococcus (02 Jun 2019 21:18)  Organism: Coag Negative Staphylococcus (02 Jun 2019 21:18)  Organism: Staphylococcus aureus (02 Jun 2019 21:18)    Culture - Tissue with Gram Stain (collected 29 May 2019 01:11)  Source: .Tissue Other, bone left great toe  Gram Stain (29 May 2019 06:00):    No polymorphonuclear cells seen per low power field    No organisms seen per oil power field  Final Report (02 Jun 2019 19:13):    No growth at 5 days         MEDICATIONS  (STANDING):  amiodarone    Tablet 200 milliGRAM(s) Oral daily  dextrose 5%. 1000 milliLiter(s) (50 mL/Hr) IV Continuous <Continuous>  dextrose 50% Injectable 12.5 Gram(s) IV Push once  dextrose 50% Injectable 25 Gram(s) IV Push once  docusate sodium 100 milliGRAM(s) Oral three times a day  enoxaparin Injectable 40 milliGRAM(s) SubCutaneous at bedtime  insulin glargine Injectable (LANTUS) 10 Unit(s) SubCutaneous at bedtime  insulin lispro (HumaLOG) corrective regimen sliding scale   SubCutaneous Before meals and at bedtime  insulin lispro Injectable (HumaLOG) 3 Unit(s) SubCutaneous three times a day before meals  nicotine -  14 mG/24Hr(s) Patch 1 patch Transdermal daily  pantoprazole    Tablet 40 milliGRAM(s) Oral two times a day  polyethylene glycol 3350 17 Gram(s) Oral at bedtime  senna 2 Tablet(s) Oral at bedtime  tamsulosin 0.8 milliGRAM(s) Oral at bedtime    MEDICATIONS  (PRN):  acetaminophen   Tablet .. 650 milliGRAM(s) Oral every 6 hours PRN Mild Pain (1 - 3)  aluminum hydroxide/magnesium hydroxide/simethicone Suspension 30 milliLiter(s) Oral every 4 hours PRN Dyspepsia  dextrose 40% Gel 15 Gram(s) Oral once PRN Blood Glucose LESS THAN 70 milliGRAM(s)/deciliter  glucagon  Injectable 1 milliGRAM(s) IntraMuscular once PRN Glucose LESS THAN 70 milligrams/deciliter  nitroglycerin     SubLingual 0.4 milliGRAM(s) SubLingual every 5 minutes PRN Chest Pain  ondansetron Injectable 4 milliGRAM(s) IV Push every 8 hours PRN Nausea and/or Vomiting  oxyCODONE    5 mG/acetaminophen 325 mG 1 Tablet(s) Oral every 4 hours PRN Moderate Pain (4 - 6)  oxyCODONE    5 mG/acetaminophen 325 mG 2 Tablet(s) Oral every 6 hours PRN Severe Pain (7 - 10)      REVIEW OF SYSTEMS:  CONSTITUTIONAL: No fever, weight loss, or fatigue  EYES: No eye pain, visual disturbances, or discharge  ENMT: No difficulty hearing, tinnitus, vertigo; No sinus or throat pain  NECK: No pain or stiffness  RESPIRATORY: No cough, wheezing, chills or hemoptysis; No shortness of breath  CARDIOVASCULAR: No chest pain, palpitations, dizziness, or leg swelling  GASTROINTESTINAL: No abdominal or epigastric pain. No nausea, vomiting, or hematemesis; No diarrhea or constipation; No melena or hematochezia  GENITOURINARY: No dysuria, frequency, hematuria, or incontinence  NEUROLOGICAL: No headaches, memory loss, loss of strength, numbness, or tremors  SKIN: No itching, burning, rashes, or lesions   LYMPH NODES: No enlarged glands  ENDOCRINE: No heat or cold intolerance; No hair loss  MUSCULOSKELETAL: No joint pain or swelling; No muscle, back, or extremity pain  PSYCHIATRIC: No depression, anxiety, mood swings, or difficulty sleeping  HEME/LYMPH: No easy bruising, or bleeding gums  ALLERGY AND IMMUNOLOGIC: No hives or eczema    RADIOLOGY & ADDITIONAL TESTS:    Imaging Personally Reviewed:  [ ] YES  [ ] NO    Consultant(s) Notes Reviewed:  [ ] YES  [ ] NO    PHYSICAL EXAM:  GENERAL: NAD, well-groomed, well-developed  HEAD: Atraumatic, Normocephalic  EYES: EOMI, PERRLA, conjunctiva and sclera clear  ENMT: No tonsillar erythema, exudates, or enlargement; Moist mucous membranes, Good dentition, No lesions  NECK: Supple, No JVD, Normal thyroid  NERVOUS SYSTEM: Alert & Oriented X3, Good concentration; Motor strength 5/5 B/L upper and lower extremities; DTRs 2+ intact and symmetric  CHEST/LUNG: Clear to percussion bilaterally; No rales, rhonchi, wheezing, or rubs  HEART: Regular rate and rhythm; No murmurs, rubs, or gallops  ABDOMEN: Soft, Nontender, Nondistended; Bowel sounds present  EXTREMITIES: 2+ Peripheral Pulses, No clubbing, cyanosis, or edema  LYMPH: No lymphadenopathy noted  SKIN: No rashes or lesions    Care Discussed with Consultants/Other Providers [ ] YES  [ ] NO Patient is a 48y old  Male who presents with a chief complaint of perforated stomach ulcer (03 Jun 2019 07:28)    48 year old male with PMH of  HTN and DM2, who presented with Perforated Antral Gastric Ulcer, s/p Emergent Exp-lap Oomentopexy and plication 5/11. Found to have new onset afib with elevated troponin post-operatively indicative of NSTEMI, started on Amiodarone and converted to sinus rhythm, now with left foot osteo plan for OR 5/28.    INTERVAL HPI/OVERNIGHT EVENTS: Patient states he feels well. Offers no complaints at this time.     T(C): 36.9 (06-03-19 @ 08:15), Max: 37.4 (06-03-19 @ 04:33)  HR: 56 (06-03-19 @ 08:15) (53 - 60)  BP: 103/61 (06-03-19 @ 08:15) (103/61 - 126/75)  RR: 18 (06-03-19 @ 08:15) (18 - 18)  SpO2: 91% (06-03-19 @ 08:15) (91% - 99%)  Wt(kg): --    I&O's Summary    02 Jun 2019 07:01  -  03 Jun 2019 07:00  --------------------------------------------------------  IN: 1015 mL / OUT: 3 mL / NET: 1012 mL        LABS:                        8.5    5.86  )-----------( 347      ( 03 Jun 2019 06:14 )             27.2     06-03    143  |  107  |  13  ----------------------------<  117<H>  4.3   |  32<H>  |  1.30    Ca    8.4<L>      03 Jun 2019 06:14        CAPILLARY BLOOD GLUCOSE      POCT Blood Glucose.: 103 mg/dL (03 Jun 2019 08:03)  POCT Blood Glucose.: 189 mg/dL (02 Jun 2019 21:11)  POCT Blood Glucose.: 171 mg/dL (02 Jun 2019 17:02)  POCT Blood Glucose.: 163 mg/dL (02 Jun 2019 11:53)          Culture - Tissue with Gram Stain (collected 29 May 2019 01:11)  Source: .Tissue Other, bone left 1st metatarsal head  Gram Stain (29 May 2019 06:01):    Rare polymorphonuclear leukocytes seen per low power field    No organisms seen per oil power field  Final Report (02 Jun 2019 21:18):    Rare Staphylococcus aureus    Few Coag Negative Staphylococcus  Organism: Staphylococcus aureus  Coag Negative Staphylococcus (02 Jun 2019 21:18)  Organism: Coag Negative Staphylococcus (02 Jun 2019 21:18)  Organism: Staphylococcus aureus (02 Jun 2019 21:18)    Culture - Tissue with Gram Stain (collected 29 May 2019 01:11)  Source: .Tissue Other, bone left great toe  Gram Stain (29 May 2019 06:00):    No polymorphonuclear cells seen per low power field    No organisms seen per oil power field  Final Report (02 Jun 2019 19:13):    No growth at 5 days         MEDICATIONS  (STANDING):  amiodarone    Tablet 200 milliGRAM(s) Oral daily  dextrose 5%. 1000 milliLiter(s) (50 mL/Hr) IV Continuous <Continuous>  dextrose 50% Injectable 12.5 Gram(s) IV Push once  dextrose 50% Injectable 25 Gram(s) IV Push once  docusate sodium 100 milliGRAM(s) Oral three times a day  enoxaparin Injectable 40 milliGRAM(s) SubCutaneous at bedtime  insulin glargine Injectable (LANTUS) 10 Unit(s) SubCutaneous at bedtime  insulin lispro (HumaLOG) corrective regimen sliding scale   SubCutaneous Before meals and at bedtime  insulin lispro Injectable (HumaLOG) 3 Unit(s) SubCutaneous three times a day before meals  nicotine -  14 mG/24Hr(s) Patch 1 patch Transdermal daily  pantoprazole    Tablet 40 milliGRAM(s) Oral two times a day  polyethylene glycol 3350 17 Gram(s) Oral at bedtime  senna 2 Tablet(s) Oral at bedtime  tamsulosin 0.8 milliGRAM(s) Oral at bedtime    MEDICATIONS  (PRN):  acetaminophen   Tablet .. 650 milliGRAM(s) Oral every 6 hours PRN Mild Pain (1 - 3)  aluminum hydroxide/magnesium hydroxide/simethicone Suspension 30 milliLiter(s) Oral every 4 hours PRN Dyspepsia  dextrose 40% Gel 15 Gram(s) Oral once PRN Blood Glucose LESS THAN 70 milliGRAM(s)/deciliter  glucagon  Injectable 1 milliGRAM(s) IntraMuscular once PRN Glucose LESS THAN 70 milligrams/deciliter  nitroglycerin     SubLingual 0.4 milliGRAM(s) SubLingual every 5 minutes PRN Chest Pain  ondansetron Injectable 4 milliGRAM(s) IV Push every 8 hours PRN Nausea and/or Vomiting  oxyCODONE    5 mG/acetaminophen 325 mG 1 Tablet(s) Oral every 4 hours PRN Moderate Pain (4 - 6)  oxyCODONE    5 mG/acetaminophen 325 mG 2 Tablet(s) Oral every 6 hours PRN Severe Pain (7 - 10)      REVIEW OF SYSTEMS:  CONSTITUTIONAL: No fever, weight loss, or fatigue  EYES: No eye pain, visual disturbances, or discharge  ENMT: No difficulty hearing, tinnitus, vertigo; No sinus or throat pain  NECK: No pain or stiffness  RESPIRATORY: No cough, wheezing, chills or hemoptysis; No shortness of breath  CARDIOVASCULAR: No chest pain, palpitations, dizziness, or leg swelling  GASTROINTESTINAL: No abdominal or epigastric pain. No nausea, vomiting, or hematemesis; No diarrhea or constipation; No melena or hematochezia  GENITOURINARY: No dysuria, frequency, hematuria, or incontinence  NEUROLOGICAL: No headaches, memory loss, loss of strength, numbness, or tremors  SKIN: No itching, burning, rashes, or lesions   LYMPH NODES: No enlarged glands  ENDOCRINE: No heat or cold intolerance; No hair loss  MUSCULOSKELETAL: No joint pain or swelling; No muscle, back, or extremity pain  PSYCHIATRIC: No depression, anxiety, mood swings, or difficulty sleeping  HEME/LYMPH: No easy bruising, or bleeding gums  ALLERGY AND IMMUNOLOGIC: No hives or eczema    RADIOLOGY & ADDITIONAL TESTS:    Imaging Personally Reviewed:  [x ] YES  [ ] NO    Consultant(s) Notes Reviewed:  [x ] YES  [ ] NO    PHYSICAL EXAM:  GENERAL: NAD, well-developed  HEAD: Atraumatic, Normocephalic  EYES: EOMI, PERRLA, conjunctiva and sclera clear  ENMT: No tonsillar erythema, exudates, or enlargement; Moist mucous membranes  NECK: Supple, No JVD  NERVOUS SYSTEM: Alert & Oriented X3, Good concentration; Motor strength 5/5 B/L upper and lower extremities  CHEST/LUNG: Clear to auscultation bilaterally, No rales, rhonchi, wheezing, or rubs  HEART: Regular rate and rhythm; No murmurs, rubs, or gallops  ABDOMEN: Soft, Nontender, Nondistended; Bowel sounds present  EXTREMITIES: 2+ Peripheral Pulses, 1+ pitting edema of ankles      Care Discussed with Consultants/Other Providers [X] YES  [ ] NO

## 2019-06-03 NOTE — PROGRESS NOTE ADULT - SUBJECTIVE AND OBJECTIVE BOX
CAPILLARY BLOOD GLUCOSE      POCT Blood Glucose.: 103 mg/dL (03 Jun 2019 08:03)  POCT Blood Glucose.: 189 mg/dL (02 Jun 2019 21:11)  POCT Blood Glucose.: 171 mg/dL (02 Jun 2019 17:02)  POCT Blood Glucose.: 163 mg/dL (02 Jun 2019 11:53)      Vital Signs Last 24 Hrs  T(C): 36.9 (03 Jun 2019 08:15), Max: 37.4 (03 Jun 2019 04:33)  T(F): 98.4 (03 Jun 2019 08:15), Max: 99.4 (03 Jun 2019 04:33)  HR: 56 (03 Jun 2019 08:15) (53 - 60)  BP: 103/61 (03 Jun 2019 08:15) (103/61 - 126/75)  BP(mean): --  RR: 18 (03 Jun 2019 08:15) (18 - 18)  SpO2: 91% (03 Jun 2019 08:15) (91% - 99%)    General: WN/WD NAD  Respiratory: CTA B/L  CV: RRR, S1S2, no murmurs, rubs or gallops  Abdominal: Soft, NT, ND +BS, Last BM  Extremities: le foot dsg intact     06-03    143  |  107  |  13  ----------------------------<  117<H>  4.3   |  32<H>  |  1.30    Ca    8.4<L>      03 Jun 2019 06:14        dextrose 40% Gel 15 Gram(s) Oral once PRN  dextrose 50% Injectable 12.5 Gram(s) IV Push once  dextrose 50% Injectable 25 Gram(s) IV Push once  glucagon  Injectable 1 milliGRAM(s) IntraMuscular once PRN  insulin glargine Injectable (LANTUS) 10 Unit(s) SubCutaneous at bedtime  insulin lispro (HumaLOG) corrective regimen sliding scale   SubCutaneous Before meals and at bedtime  insulin lispro Injectable (HumaLOG) 3 Unit(s) SubCutaneous three times a day before meals

## 2019-06-04 LAB
ANION GAP SERPL CALC-SCNC: 7 MMOL/L — SIGNIFICANT CHANGE UP (ref 5–17)
BUN SERPL-MCNC: 15 MG/DL — SIGNIFICANT CHANGE UP (ref 7–23)
CALCIUM SERPL-MCNC: 8.4 MG/DL — LOW (ref 8.5–10.1)
CHLORIDE SERPL-SCNC: 106 MMOL/L — SIGNIFICANT CHANGE UP (ref 96–108)
CO2 SERPL-SCNC: 29 MMOL/L — SIGNIFICANT CHANGE UP (ref 22–31)
CREAT SERPL-MCNC: 1.2 MG/DL — SIGNIFICANT CHANGE UP (ref 0.5–1.3)
GLUCOSE SERPL-MCNC: 120 MG/DL — HIGH (ref 70–99)
HCT VFR BLD CALC: 27.7 % — LOW (ref 39–50)
HGB BLD-MCNC: 8.7 G/DL — LOW (ref 13–17)
MCHC RBC-ENTMCNC: 30.6 PG — SIGNIFICANT CHANGE UP (ref 27–34)
MCHC RBC-ENTMCNC: 31.4 GM/DL — LOW (ref 32–36)
MCV RBC AUTO: 97.5 FL — SIGNIFICANT CHANGE UP (ref 80–100)
NRBC # BLD: 0 /100 WBCS — SIGNIFICANT CHANGE UP (ref 0–0)
PLATELET # BLD AUTO: 344 K/UL — SIGNIFICANT CHANGE UP (ref 150–400)
POTASSIUM SERPL-MCNC: 3.9 MMOL/L — SIGNIFICANT CHANGE UP (ref 3.5–5.3)
POTASSIUM SERPL-SCNC: 3.9 MMOL/L — SIGNIFICANT CHANGE UP (ref 3.5–5.3)
RBC # BLD: 2.84 M/UL — LOW (ref 4.2–5.8)
RBC # FLD: 13.9 % — SIGNIFICANT CHANGE UP (ref 10.3–14.5)
SODIUM SERPL-SCNC: 142 MMOL/L — SIGNIFICANT CHANGE UP (ref 135–145)
WBC # BLD: 5.93 K/UL — SIGNIFICANT CHANGE UP (ref 3.8–10.5)
WBC # FLD AUTO: 5.93 K/UL — SIGNIFICANT CHANGE UP (ref 3.8–10.5)

## 2019-06-04 PROCEDURE — 99232 SBSQ HOSP IP/OBS MODERATE 35: CPT

## 2019-06-04 RX ADMIN — Medication 3 UNIT(S): at 17:18

## 2019-06-04 RX ADMIN — INSULIN GLARGINE 10 UNIT(S): 100 INJECTION, SOLUTION SUBCUTANEOUS at 21:43

## 2019-06-04 RX ADMIN — PANTOPRAZOLE SODIUM 40 MILLIGRAM(S): 20 TABLET, DELAYED RELEASE ORAL at 05:30

## 2019-06-04 RX ADMIN — AMIODARONE HYDROCHLORIDE 200 MILLIGRAM(S): 400 TABLET ORAL at 05:30

## 2019-06-04 RX ADMIN — Medication 3 UNIT(S): at 12:22

## 2019-06-04 RX ADMIN — Medication 3 UNIT(S): at 08:10

## 2019-06-04 RX ADMIN — Medication 1 PATCH: at 11:23

## 2019-06-04 RX ADMIN — Medication 1 PATCH: at 11:30

## 2019-06-04 RX ADMIN — Medication 1 PATCH: at 07:53

## 2019-06-04 RX ADMIN — OXYCODONE AND ACETAMINOPHEN 2 TABLET(S): 5; 325 TABLET ORAL at 04:51

## 2019-06-04 RX ADMIN — Medication 1 PATCH: at 19:30

## 2019-06-04 RX ADMIN — TAMSULOSIN HYDROCHLORIDE 0.8 MILLIGRAM(S): 0.4 CAPSULE ORAL at 21:41

## 2019-06-04 RX ADMIN — ENOXAPARIN SODIUM 40 MILLIGRAM(S): 100 INJECTION SUBCUTANEOUS at 21:43

## 2019-06-04 RX ADMIN — PANTOPRAZOLE SODIUM 40 MILLIGRAM(S): 20 TABLET, DELAYED RELEASE ORAL at 17:19

## 2019-06-04 RX ADMIN — Medication 100 MILLIGRAM(S): at 05:30

## 2019-06-04 RX ADMIN — Medication 2: at 21:42

## 2019-06-04 NOTE — PROGRESS NOTE ADULT - ATTENDING COMMENTS
Patient seen and evaluated  Dressing changed to dry sterile dressing  OR cultures and path report noted  Follow ID recs  Patient is podiatrically stable for discharge   Patient to ambulate with surgical shoe and partial weight bearing to heel   Patient will keep dressings clean dry and intact until follow up in one week with me in the wound care center.

## 2019-06-04 NOTE — PROGRESS NOTE ADULT - PROBLEM SELECTOR PLAN 5
-remains resolved at present  ekg/tele nsr, without st or t changes. CE consistent with possible NSTEMI  sublingual nitro prn now   appreciate cardio rec

## 2019-06-04 NOTE — PROGRESS NOTE ADULT - SUBJECTIVE AND OBJECTIVE BOX
CAPILLARY BLOOD GLUCOSE      POCT Blood Glucose.: 121 mg/dL (04 Jun 2019 07:30)  POCT Blood Glucose.: 186 mg/dL (03 Jun 2019 21:59)  POCT Blood Glucose.: 200 mg/dL (03 Jun 2019 17:54)  POCT Blood Glucose.: 167 mg/dL (03 Jun 2019 16:43)  POCT Blood Glucose.: 169 mg/dL (03 Jun 2019 16:38)  POCT Blood Glucose.: 138 mg/dL (03 Jun 2019 11:50)  POCT Blood Glucose.: 103 mg/dL (03 Jun 2019 08:03)      Vital Signs Last 24 Hrs  T(C): 36.3 (04 Jun 2019 05:03), Max: 37.1 (03 Jun 2019 15:30)  T(F): 97.4 (04 Jun 2019 05:03), Max: 98.7 (03 Jun 2019 15:30)  HR: 56 (04 Jun 2019 05:03) (52 - 92)  BP: 104/67 (04 Jun 2019 05:03) (103/61 - 136/79)  BP(mean): --  RR: 18 (04 Jun 2019 05:03) (18 - 18)  SpO2: 95% (04 Jun 2019 05:03) (91% - 100%)    General: WN/WD NAD  Respiratory: CTA B/L  CV: RRR, S1S2, no murmurs, rubs or gallops  Abdominal: Soft, NT, ND +BS, Last BM  Extremities: b/l le foot dsg intact     06-04    142  |  106  |  15  ----------------------------<  120<H>  3.9   |  29  |  1.20    Ca    8.4<L>      04 Jun 2019 07:24        dextrose 40% Gel 15 Gram(s) Oral once PRN  dextrose 50% Injectable 12.5 Gram(s) IV Push once  dextrose 50% Injectable 25 Gram(s) IV Push once  glucagon  Injectable 1 milliGRAM(s) IntraMuscular once PRN  insulin glargine Injectable (LANTUS) 10 Unit(s) SubCutaneous at bedtime  insulin lispro (HumaLOG) corrective regimen sliding scale   SubCutaneous Before meals and at bedtime  insulin lispro Injectable (HumaLOG) 3 Unit(s) SubCutaneous three times a day before meals

## 2019-06-04 NOTE — PROGRESS NOTE ADULT - SUBJECTIVE AND OBJECTIVE BOX
[facility] [place] [facility] -- Bolivar Carbajal, El, Brittany, Reynaldo Sweeney Savella  Office # [phone]      Follow Up:    Afib w/ RVR Preop /Post follow up   Subjective/Observations:   No events overnight resting comfortably in bed.  No complaints of chest pain, dyspnea, or palpitations reported. No signs of orthopnea or PND.     REVIEW OF SYSTEMS: All other review of systems is negative unless indicated above    PAST MEDICAL & SURGICAL HISTORY:  Diabetes mellitus with no complication  No significant past surgical history      MEDICATIONS  (STANDING):  amiodarone    Tablet 200 milliGRAM(s) Oral daily  dextrose 5%. 1000 milliLiter(s) (50 mL/Hr) IV Continuous <Continuous>  dextrose 50% Injectable 12.5 Gram(s) IV Push once  dextrose 50% Injectable 25 Gram(s) IV Push once  docusate sodium 100 milliGRAM(s) Oral three times a day  enoxaparin Injectable 40 milliGRAM(s) SubCutaneous at bedtime  insulin glargine Injectable (LANTUS) 10 Unit(s) SubCutaneous at bedtime  insulin lispro (HumaLOG) corrective regimen sliding scale   SubCutaneous Before meals and at bedtime  insulin lispro Injectable (HumaLOG) 3 Unit(s) SubCutaneous three times a day before meals  nicotine -  14 mG/24Hr(s) Patch 1 patch Transdermal daily  pantoprazole    Tablet 40 milliGRAM(s) Oral two times a day  polyethylene glycol 3350 17 Gram(s) Oral at bedtime  senna 2 Tablet(s) Oral at bedtime  tamsulosin 0.8 milliGRAM(s) Oral at bedtime    MEDICATIONS  (PRN):  acetaminophen   Tablet .. 650 milliGRAM(s) Oral every 6 hours PRN Mild Pain (1 - 3)  aluminum hydroxide/magnesium hydroxide/simethicone Suspension 30 milliLiter(s) Oral every 4 hours PRN Dyspepsia  dextrose 40% Gel 15 Gram(s) Oral once PRN Blood Glucose LESS THAN 70 milliGRAM(s)/deciliter  glucagon  Injectable 1 milliGRAM(s) IntraMuscular once PRN Glucose LESS THAN 70 milligrams/deciliter  nitroglycerin     SubLingual 0.4 milliGRAM(s) SubLingual every 5 minutes PRN Chest Pain  ondansetron Injectable 4 milliGRAM(s) IV Push every 8 hours PRN Nausea and/or Vomiting  oxyCODONE    5 mG/acetaminophen 325 mG 1 Tablet(s) Oral every 4 hours PRN Moderate Pain (4 - 6)  oxyCODONE    5 mG/acetaminophen 325 mG 2 Tablet(s) Oral every 6 hours PRN Severe Pain (7 - 10)      Allergies    No Known Allergies    Intolerances        Vital Signs Last 24 Hrs  T(C): 36.5 (04 Jun 2019 08:37), Max: 37.1 (03 Jun 2019 15:30)  T(F): 97.7 (04 Jun 2019 08:37), Max: 98.7 (03 Jun 2019 15:30)  HR: 57 (04 Jun 2019 08:37) (52 - 92)  BP: 102/69 (04 Jun 2019 08:37) (102/69 - 136/79)  BP(mean): --  RR: 18 (04 Jun 2019 08:37) (18 - 18)  SpO2: 94% (04 Jun 2019 08:37) (93% - 100%)    I&O's Summary    03 Jun 2019 07:01  -  04 Jun 2019 07:00  --------------------------------------------------------  IN: 0 mL / OUT: 300 mL / NET: -300 mL    04 Jun 2019 07:01  -  04 Jun 2019 10:15  --------------------------------------------------------  IN: 0 mL / OUT: 400 mL / NET: -400 mL          PHYSICAL EXAM:  TELE: Off tele   Constitutional: NAD, awake and alert, well-developed  HEENT: Moist Mucous Membranes, Anicteric  Pulmonary: Non-labored, breath sounds are clear bilaterally, No wheezing, crackles or rhonchi  Cardiovascular: Regular, S1 and S2 nl, + murmur No rubs, gallops or clicks   Gastrointestinal: Bowel Sounds present, soft, nontender.   Lymph: No lymphadenopathy. No peripheral edema.  Skin: No visible rashes or ulcers.  Psych:  Mood & affect appropriate    LABS: All Labs Reviewed:                        8.7    5.93  )-----------( 344      ( 04 Jun 2019 07:24 )             27.7                         8.5    5.86  )-----------( 347      ( 03 Jun 2019 06:14 )             27.2                         7.3    6.25  )-----------( 375      ( 02 Jun 2019 06:40 )             23.3     04 Jun 2019 07:24    142    |  106    |  15     ----------------------------<  120    3.9     |  29     |  1.20   03 Jun 2019 06:14    143    |  107    |  13     ----------------------------<  117    4.3     |  32     |  1.30   02 Jun 2019 06:40    143    |  107    |  13     ----------------------------<  117    3.9     |  29     |  1.10     Ca    8.4        04 Jun 2019 07:24  Ca    8.4        03 Jun 2019 06:14  Ca    8.0        02 Jun 2019 06:40        Echo:  < from: TTE Echo Doppler w/o Cont (05.16.19 @ 09:45) >  OBSERVATIONS:    Mitral Valve: Thickened leaflets, moderate MR.  Aortic Valve/Aorta: Sclerotic trileaflet aortic valve with normal   opening. Trace AI  Tricuspid Valve: normal with mild TR.  Pulmonic Valve: normal  Left Atrium: Mildly dilated  Right Atrium: normal  Left Ventricle: normal LV size and systolic function, estimated LVEF of   65%. No evidence of segmental dysfunction. Basal septal hypertrophy is   present  Right Ventricle: normal size and systolic function.  Pericardium/Pleura: normal, no significant pericardial effusion.    Conclusion:     normal LV size and systolic function, estimated LVEF of 65%. No evidence   of segmental dysfunction  Normal RV size and systolic function.   Mild left atrial enlargement  Sclerotic trileaflet aortic valve with normal opening. Trace AI  Mitral valve with thickened leaflets and moderate MR  Mild TR  No significant pericardial effusion.     Ed [facility] -- Bolivar Carbajal, El, Brittany, Reynaldo Sweeney Savella  Office # [phone]      Follow Up:    Afib w/ RVR Preop /Post follow up     Subjective/Observations:   No events overnight resting comfortably in bed.  No complaints of chest pain, dyspnea, or palpitations reported. No signs of orthopnea or PND.     REVIEW OF SYSTEMS: All other review of systems is negative unless indicated above    PAST MEDICAL & SURGICAL HISTORY:  Diabetes mellitus with no complication  No significant past surgical history      MEDICATIONS  (STANDING):  amiodarone    Tablet 200 milliGRAM(s) Oral daily  dextrose 5%. 1000 milliLiter(s) (50 mL/Hr) IV Continuous <Continuous>  dextrose 50% Injectable 12.5 Gram(s) IV Push once  dextrose 50% Injectable 25 Gram(s) IV Push once  docusate sodium 100 milliGRAM(s) Oral three times a day  enoxaparin Injectable 40 milliGRAM(s) SubCutaneous at bedtime  insulin glargine Injectable (LANTUS) 10 Unit(s) SubCutaneous at bedtime  insulin lispro (HumaLOG) corrective regimen sliding scale   SubCutaneous Before meals and at bedtime  insulin lispro Injectable (HumaLOG) 3 Unit(s) SubCutaneous three times a day before meals  nicotine -  14 mG/24Hr(s) Patch 1 patch Transdermal daily  pantoprazole    Tablet 40 milliGRAM(s) Oral two times a day  polyethylene glycol 3350 17 Gram(s) Oral at bedtime  senna 2 Tablet(s) Oral at bedtime  tamsulosin 0.8 milliGRAM(s) Oral at bedtime    MEDICATIONS  (PRN):  acetaminophen   Tablet .. 650 milliGRAM(s) Oral every 6 hours PRN Mild Pain (1 - 3)  aluminum hydroxide/magnesium hydroxide/simethicone Suspension 30 milliLiter(s) Oral every 4 hours PRN Dyspepsia  dextrose 40% Gel 15 Gram(s) Oral once PRN Blood Glucose LESS THAN 70 milliGRAM(s)/deciliter  glucagon  Injectable 1 milliGRAM(s) IntraMuscular once PRN Glucose LESS THAN 70 milligrams/deciliter  nitroglycerin     SubLingual 0.4 milliGRAM(s) SubLingual every 5 minutes PRN Chest Pain  ondansetron Injectable 4 milliGRAM(s) IV Push every 8 hours PRN Nausea and/or Vomiting  oxyCODONE    5 mG/acetaminophen 325 mG 1 Tablet(s) Oral every 4 hours PRN Moderate Pain (4 - 6)  oxyCODONE    5 mG/acetaminophen 325 mG 2 Tablet(s) Oral every 6 hours PRN Severe Pain (7 - 10)      Allergies    No Known Allergies    Intolerances        Vital Signs Last 24 Hrs  T(C): 36.5 (04 Jun 2019 08:37), Max: 37.1 (03 Jun 2019 15:30)  T(F): 97.7 (04 Jun 2019 08:37), Max: 98.7 (03 Jun 2019 15:30)  HR: 57 (04 Jun 2019 08:37) (52 - 92)  BP: 102/69 (04 Jun 2019 08:37) (102/69 - 136/79)  BP(mean): --  RR: 18 (04 Jun 2019 08:37) (18 - 18)  SpO2: 94% (04 Jun 2019 08:37) (93% - 100%)    I&O's Summary    03 Jun 2019 07:01  -  04 Jun 2019 07:00  --------------------------------------------------------  IN: 0 mL / OUT: 300 mL / NET: -300 mL    04 Jun 2019 07:01  -  04 Jun 2019 10:15  --------------------------------------------------------  IN: 0 mL / OUT: 400 mL / NET: -400 mL          PHYSICAL EXAM:  TELE: Off tele   Constitutional: NAD, awake and alert, well-developed  HEENT: Moist Mucous Membranes, Anicteric  Pulmonary: Non-labored, breath sounds are clear bilaterally, No wheezing, crackles or rhonchi  Cardiovascular: Regular, S1 and S2 nl, + murmur No rubs, gallops or clicks   Gastrointestinal: Bowel Sounds present, soft, nontender.   Lymph: No lymphadenopathy. No peripheral edema.  Skin: No visible rashes or ulcers.  Psych:  Mood & affect appropriate    LABS: All Labs Reviewed:                        8.7    5.93  )-----------( 344      ( 04 Jun 2019 07:24 )             27.7                         8.5    5.86  )-----------( 347      ( 03 Jun 2019 06:14 )             27.2                         7.3    6.25  )-----------( 375      ( 02 Jun 2019 06:40 )             23.3     04 Jun 2019 07:24    142    |  106    |  15     ----------------------------<  120    3.9     |  29     |  1.20   03 Jun 2019 06:14    143    |  107    |  13     ----------------------------<  117    4.3     |  32     |  1.30   02 Jun 2019 06:40    143    |  107    |  13     ----------------------------<  117    3.9     |  29     |  1.10     Ca    8.4        04 Jun 2019 07:24  Ca    8.4        03 Jun 2019 06:14  Ca    8.0        02 Jun 2019 06:40        Echo:  < from: TTE Echo Doppler w/o Cont (05.16.19 @ 09:45) >  OBSERVATIONS:    Mitral Valve: Thickened leaflets, moderate MR.  Aortic Valve/Aorta: Sclerotic trileaflet aortic valve with normal   opening. Trace AI  Tricuspid Valve: normal with mild TR.  Pulmonic Valve: normal  Left Atrium: Mildly dilated  Right Atrium: normal  Left Ventricle: normal LV size and systolic function, estimated LVEF of   65%. No evidence of segmental dysfunction. Basal septal hypertrophy is   present  Right Ventricle: normal size and systolic function.  Pericardium/Pleura: normal, no significant pericardial effusion.    Conclusion:     normal LV size and systolic function, estimated LVEF of 65%. No evidence   of segmental dysfunction  Normal RV size and systolic function.   Mild left atrial enlargement  Sclerotic trileaflet aortic valve with normal opening. Trace AI  Mitral valve with thickened leaflets and moderate MR  Mild TR  No significant pericardial effusion.     Ed [facility]

## 2019-06-04 NOTE — PROGRESS NOTE ADULT - ASSESSMENT
48 year old male with HTN and DM2, who presented with Perforated Antral Gastric Ulcer, POD 5 s/p Emergent Exp-lap Oomentopexy and plication. Tolerated procedure well, however, had Afib with RVR  postop, spontaneously to NSR which was new. He had an elevated troponin possibly suggestive of non-ST segment elevation myocardial infarction/demand ischemia. He has no known history of coronary artery disease    Afib  - Per flow sheet, HR has been controlled  - Continue Amio, plan is to stop Amio as an outpatient and consider BB instead  - TTE with normal LV function, moderate MR   - No need for long term AC as Afib was short lived   - Maintain  K>4, Mg>2  - Recommend an event monitor as outpatient.    Anemia  -stable   -  he remains hemodynamically stable with no overt signs of bleeding  - As per primary team  - Off full dose AC    Left foot OM  - MRI with left foot osteomyelitis and now S/P left hallux resection with debridement tolerated procedure w/o cardiac compromise   - ID following.  Planned for long term Abx    - All other workup per Primary. Will continue to follow     Ed ALVAREZ  Cardiology 48 year old male with HTN and DM2, who presented with Perforated Antral Gastric Ulcer, POD 5 s/p Emergent Exp-lap Oomentopexy and plication. Tolerated procedure well, however, had Afib with RVR  postop, spontaneously to NSR which was new. He had an elevated troponin possibly suggestive of non-ST segment elevation myocardial infarction/demand ischemia. He has no known history of coronary artery disease    Afib  - Per flow sheet, HR has been controlled  - Continue Amio, plan is to stop Amio as an outpatient and consider BB instead  - TTE with normal LV function, moderate MR   - No need for long term AC as Afib was short lived   - Maintain  K>4, Mg>2  - Recommend an event monitor as outpatient.    Anemia  -stable   -  he remains hemodynamically stable with no overt signs of bleeding  - As per primary team  - Off full dose AC    Left foot OM  - MRI with left foot osteomyelitis and now S/P left hallux resection with debridement tolerated procedure w/o cardiac compromise   - ID following.  no need for abx.     - All other workup per Primary. Will continue to follow   DC planning per primary team.     Ed Paula ANP  Cardiology

## 2019-06-04 NOTE — PROGRESS NOTE ADULT - PROBLEM SELECTOR PLAN 1
cont lantus 10 units qhs  cont humalog 3 units 3x/day before meals  cont mod dose humalog scale coverage  goal bg 100-180 in hosp setting

## 2019-06-04 NOTE — PROGRESS NOTE ADULT - PROBLEM SELECTOR PLAN 7
VTE prophylaxis: Off full dose AC at this time as risks>benefits in setting of anemia,  SCDs for now. will c/w lovenox
VTE prophylaxis: Off full dose AC at this time as risks>benefits in setting of anemia,  SCDs for now. will resume lovenox
VTE prophylaxis: Off full dose AC at this time as risks>benefits in setting of anemia,  SCDs for now. will c/w lovenox  - Smoking cessation: Nicotine patch
VTE prophylaxis: Off full dose AC at this time as risks>benefits in setting of anemia,  SCDs for now. will c/w lovenox
VTE prophylaxis: Off full dose AC at this time as risks>benefits in setting of anemia,  SCDs for now. will c/w lovenox  - Smoking cessation: Nicotine patch
VTE prophylaxis: Off full dose AC at this time as risks>benefits in setting of anemia, prophylactic Lovenox on hold for planned surgery tomorrow. SCDs for now.

## 2019-06-04 NOTE — PROGRESS NOTE ADULT - SUBJECTIVE AND OBJECTIVE BOX
Patient is a 48y old  Male who presents with a chief complaint of perforated stomach ulcer (03 Jun 2019 13:28)    48 year old male with PMH of  HTN and DM2, who presented with Perforated Antral Gastric Ulcer, s/p Emergent Exp-lap Oomentopexy and plication 5/11. Found to have new onset afib with elevated troponin post-operatively indicative of NSTEMI, started on Amiodarone and converted to sinus rhythm, now with left foot osteo debridement 5/28. Antibiotics stopped.    INTERVAL HPI/OVERNIGHT EVENTS:      T(C): 36.3 (06-04-19 @ 05:03), Max: 37.1 (06-03-19 @ 15:30)  HR: 56 (06-04-19 @ 05:03) (52 - 92)  BP: 104/67 (06-04-19 @ 05:03) (103/61 - 136/79)  RR: 18 (06-04-19 @ 05:03) (18 - 18)  SpO2: 95% (06-04-19 @ 05:03) (91% - 100%)  Wt(kg): --    I&O's Summary    03 Jun 2019 07:01  -  04 Jun 2019 07:00  --------------------------------------------------------  IN: 0 mL / OUT: 300 mL / NET: -300 mL        LABS:                        8.5    5.86  )-----------( 347      ( 03 Jun 2019 06:14 )             27.2     06-03    143  |  107  |  13  ----------------------------<  117<H>  4.3   |  32<H>  |  1.30    Ca    8.4<L>      03 Jun 2019 06:14        CAPILLARY BLOOD GLUCOSE      POCT Blood Glucose.: 186 mg/dL (03 Jun 2019 21:59)  POCT Blood Glucose.: 200 mg/dL (03 Jun 2019 17:54)  POCT Blood Glucose.: 167 mg/dL (03 Jun 2019 16:43)  POCT Blood Glucose.: 169 mg/dL (03 Jun 2019 16:38)  POCT Blood Glucose.: 138 mg/dL (03 Jun 2019 11:50)  POCT Blood Glucose.: 103 mg/dL (03 Jun 2019 08:03)          Culture - Tissue with Gram Stain (collected 29 May 2019 01:11)  Source: .Tissue Other, bone left 1st metatarsal head  Gram Stain (29 May 2019 06:01):    Rare polymorphonuclear leukocytes seen per low power field    No organisms seen per oil power field  Final Report (02 Jun 2019 21:18):    Rare Staphylococcus aureus    Few Coag Negative Staphylococcus  Organism: Staphylococcus aureus  Coag Negative Staphylococcus (02 Jun 2019 21:18)  Organism: Coag Negative Staphylococcus (02 Jun 2019 21:18)  Organism: Staphylococcus aureus (02 Jun 2019 21:18)    Culture - Tissue with Gram Stain (collected 29 May 2019 01:11)  Source: .Tissue Other, bone left great toe  Gram Stain (29 May 2019 06:00):    No polymorphonuclear cells seen per low power field    No organisms seen per oil power field  Final Report (02 Jun 2019 19:13):    No growth at 5 days         MEDICATIONS  (STANDING):  amiodarone    Tablet 200 milliGRAM(s) Oral daily  dextrose 5%. 1000 milliLiter(s) (50 mL/Hr) IV Continuous <Continuous>  dextrose 50% Injectable 12.5 Gram(s) IV Push once  dextrose 50% Injectable 25 Gram(s) IV Push once  docusate sodium 100 milliGRAM(s) Oral three times a day  enoxaparin Injectable 40 milliGRAM(s) SubCutaneous at bedtime  insulin glargine Injectable (LANTUS) 10 Unit(s) SubCutaneous at bedtime  insulin lispro (HumaLOG) corrective regimen sliding scale   SubCutaneous Before meals and at bedtime  insulin lispro Injectable (HumaLOG) 3 Unit(s) SubCutaneous three times a day before meals  nicotine -  14 mG/24Hr(s) Patch 1 patch Transdermal daily  pantoprazole    Tablet 40 milliGRAM(s) Oral two times a day  polyethylene glycol 3350 17 Gram(s) Oral at bedtime  senna 2 Tablet(s) Oral at bedtime  tamsulosin 0.8 milliGRAM(s) Oral at bedtime    MEDICATIONS  (PRN):  acetaminophen   Tablet .. 650 milliGRAM(s) Oral every 6 hours PRN Mild Pain (1 - 3)  aluminum hydroxide/magnesium hydroxide/simethicone Suspension 30 milliLiter(s) Oral every 4 hours PRN Dyspepsia  dextrose 40% Gel 15 Gram(s) Oral once PRN Blood Glucose LESS THAN 70 milliGRAM(s)/deciliter  glucagon  Injectable 1 milliGRAM(s) IntraMuscular once PRN Glucose LESS THAN 70 milligrams/deciliter  nitroglycerin     SubLingual 0.4 milliGRAM(s) SubLingual every 5 minutes PRN Chest Pain  ondansetron Injectable 4 milliGRAM(s) IV Push every 8 hours PRN Nausea and/or Vomiting  oxyCODONE    5 mG/acetaminophen 325 mG 1 Tablet(s) Oral every 4 hours PRN Moderate Pain (4 - 6)  oxyCODONE    5 mG/acetaminophen 325 mG 2 Tablet(s) Oral every 6 hours PRN Severe Pain (7 - 10)      REVIEW OF SYSTEMS:  CONSTITUTIONAL: No fever, weight loss, or fatigue  EYES: No eye pain, visual disturbances, or discharge  ENMT: No difficulty hearing, tinnitus, vertigo; No sinus or throat pain  NECK: No pain or stiffness  RESPIRATORY: No cough, wheezing, chills or hemoptysis; No shortness of breath  CARDIOVASCULAR: No chest pain, palpitations, dizziness, or leg swelling  GASTROINTESTINAL: No abdominal or epigastric pain. No nausea, vomiting, or hematemesis; No diarrhea or constipation; No melena or hematochezia  GENITOURINARY: No dysuria, frequency, hematuria, or incontinence  NEUROLOGICAL: No headaches, memory loss, loss of strength, numbness, or tremors  SKIN: No itching, burning, rashes, or lesions   LYMPH NODES: No enlarged glands  ENDOCRINE: No heat or cold intolerance; No hair loss  MUSCULOSKELETAL: No joint pain or swelling; No muscle, back, or extremity pain  PSYCHIATRIC: No depression, anxiety, mood swings, or difficulty sleeping  HEME/LYMPH: No easy bruising, or bleeding gums  ALLERGY AND IMMUNOLOGIC: No hives or eczema    RADIOLOGY & ADDITIONAL TESTS:    Imaging Personally Reviewed:  [ x] YES  [ ] NO    Consultant(s) Notes Reviewed:  [ x] YES  [ ] NO    PHYSICAL EXAM:  GENERAL: NAD, well-developed  HEAD: Atraumatic, Normocephalic  EYES: EOMI, PERRLA, conjunctiva and sclera clear  ENMT: No tonsillar erythema, exudates, or enlargement; Moist mucous membranes  NECK: Supple, No JVD  NERVOUS SYSTEM: Alert & Oriented X3, Good concentration; Motor strength 5/5 B/L upper and lower extremities  CHEST/LUNG: Clear to auscultation bilaterally, No rales, rhonchi, wheezing, or rubs  HEART: Regular rate and rhythm; No murmurs, rubs, or gallops  ABDOMEN: Soft, Nontender, Nondistended; Bowel sounds present  EXTREMITIES: 2+ Peripheral Pulses, 1+ pitting edema of ankles, bandaged left foot    Care Discussed with Consultants/Other Providers [ ] YES  [ ] NO Patient is a 48y old  Male who presents with a chief complaint of perforated stomach ulcer (03 Jun 2019 13:28)    48 year old male with PMH of  HTN and DM2, who presented with Perforated Antral Gastric Ulcer, s/p Emergent Exp-lap Oomentopexy and plication 5/11. Found to have new onset afib with elevated troponin post-operatively indicative of NSTEMI, started on Amiodarone and converted to sinus rhythm, now with left foot osteo debridement 5/28. Antibiotics stopped.    INTERVAL HPI/OVERNIGHT EVENTS: No overnight events. Patient feels well and would like to go home.       T(C): 36.3 (06-04-19 @ 05:03), Max: 37.1 (06-03-19 @ 15:30)  HR: 56 (06-04-19 @ 05:03) (52 - 92)  BP: 104/67 (06-04-19 @ 05:03) (103/61 - 136/79)  RR: 18 (06-04-19 @ 05:03) (18 - 18)  SpO2: 95% (06-04-19 @ 05:03) (91% - 100%)  Wt(kg): --    I&O's Summary    03 Jun 2019 07:01  -  04 Jun 2019 07:00  --------------------------------------------------------  IN: 0 mL / OUT: 300 mL / NET: -300 mL        LABS:                        8.5    5.86  )-----------( 347      ( 03 Jun 2019 06:14 )             27.2     06-03    143  |  107  |  13  ----------------------------<  117<H>  4.3   |  32<H>  |  1.30    Ca    8.4<L>      03 Jun 2019 06:14        CAPILLARY BLOOD GLUCOSE      POCT Blood Glucose.: 186 mg/dL (03 Jun 2019 21:59)  POCT Blood Glucose.: 200 mg/dL (03 Jun 2019 17:54)  POCT Blood Glucose.: 167 mg/dL (03 Jun 2019 16:43)  POCT Blood Glucose.: 169 mg/dL (03 Jun 2019 16:38)  POCT Blood Glucose.: 138 mg/dL (03 Jun 2019 11:50)  POCT Blood Glucose.: 103 mg/dL (03 Jun 2019 08:03)          Culture - Tissue with Gram Stain (collected 29 May 2019 01:11)  Source: .Tissue Other, bone left 1st metatarsal head  Gram Stain (29 May 2019 06:01):    Rare polymorphonuclear leukocytes seen per low power field    No organisms seen per oil power field  Final Report (02 Jun 2019 21:18):    Rare Staphylococcus aureus    Few Coag Negative Staphylococcus  Organism: Staphylococcus aureus  Coag Negative Staphylococcus (02 Jun 2019 21:18)  Organism: Coag Negative Staphylococcus (02 Jun 2019 21:18)  Organism: Staphylococcus aureus (02 Jun 2019 21:18)    Culture - Tissue with Gram Stain (collected 29 May 2019 01:11)  Source: .Tissue Other, bone left great toe  Gram Stain (29 May 2019 06:00):    No polymorphonuclear cells seen per low power field    No organisms seen per oil power field  Final Report (02 Jun 2019 19:13):    No growth at 5 days         MEDICATIONS  (STANDING):  amiodarone    Tablet 200 milliGRAM(s) Oral daily  dextrose 5%. 1000 milliLiter(s) (50 mL/Hr) IV Continuous <Continuous>  dextrose 50% Injectable 12.5 Gram(s) IV Push once  dextrose 50% Injectable 25 Gram(s) IV Push once  docusate sodium 100 milliGRAM(s) Oral three times a day  enoxaparin Injectable 40 milliGRAM(s) SubCutaneous at bedtime  insulin glargine Injectable (LANTUS) 10 Unit(s) SubCutaneous at bedtime  insulin lispro (HumaLOG) corrective regimen sliding scale   SubCutaneous Before meals and at bedtime  insulin lispro Injectable (HumaLOG) 3 Unit(s) SubCutaneous three times a day before meals  nicotine -  14 mG/24Hr(s) Patch 1 patch Transdermal daily  pantoprazole    Tablet 40 milliGRAM(s) Oral two times a day  polyethylene glycol 3350 17 Gram(s) Oral at bedtime  senna 2 Tablet(s) Oral at bedtime  tamsulosin 0.8 milliGRAM(s) Oral at bedtime    MEDICATIONS  (PRN):  acetaminophen   Tablet .. 650 milliGRAM(s) Oral every 6 hours PRN Mild Pain (1 - 3)  aluminum hydroxide/magnesium hydroxide/simethicone Suspension 30 milliLiter(s) Oral every 4 hours PRN Dyspepsia  dextrose 40% Gel 15 Gram(s) Oral once PRN Blood Glucose LESS THAN 70 milliGRAM(s)/deciliter  glucagon  Injectable 1 milliGRAM(s) IntraMuscular once PRN Glucose LESS THAN 70 milligrams/deciliter  nitroglycerin     SubLingual 0.4 milliGRAM(s) SubLingual every 5 minutes PRN Chest Pain  ondansetron Injectable 4 milliGRAM(s) IV Push every 8 hours PRN Nausea and/or Vomiting  oxyCODONE    5 mG/acetaminophen 325 mG 1 Tablet(s) Oral every 4 hours PRN Moderate Pain (4 - 6)  oxyCODONE    5 mG/acetaminophen 325 mG 2 Tablet(s) Oral every 6 hours PRN Severe Pain (7 - 10)      REVIEW OF SYSTEMS:  CONSTITUTIONAL: No fever, weight loss, or fatigue  EYES: No eye pain, visual disturbances, or discharge  ENMT: No difficulty hearing, tinnitus, vertigo; No sinus or throat pain  NECK: No pain or stiffness  RESPIRATORY: No cough, wheezing, chills or hemoptysis; No shortness of breath  CARDIOVASCULAR: No chest pain, palpitations, dizziness, or leg swelling  GASTROINTESTINAL: No abdominal or epigastric pain. No nausea, vomiting, or hematemesis; No diarrhea or constipation; No melena or hematochezia  GENITOURINARY: No dysuria, frequency, hematuria, or incontinence  NEUROLOGICAL: No headaches, memory loss, loss of strength, numbness, or tremors  SKIN: No itching, burning, rashes, or lesions   LYMPH NODES: No enlarged glands  ENDOCRINE: No heat or cold intolerance; No hair loss  MUSCULOSKELETAL: No joint pain or swelling; No muscle, back, or extremity pain  PSYCHIATRIC: No depression, anxiety, mood swings, or difficulty sleeping  HEME/LYMPH: No easy bruising, or bleeding gums  ALLERGY AND IMMUNOLOGIC: No hives or eczema    RADIOLOGY & ADDITIONAL TESTS:    Imaging Personally Reviewed:  [ x] YES  [ ] NO    Consultant(s) Notes Reviewed:  [ x] YES  [ ] NO    PHYSICAL EXAM:  GENERAL: NAD, well-developed  HEAD: Atraumatic, Normocephalic  EYES: EOMI, PERRLA, conjunctiva and sclera clear  ENMT: No tonsillar erythema, exudates, or enlargement; Moist mucous membranes  NECK: Supple, No JVD  NERVOUS SYSTEM: Alert & Oriented X3, Good concentration; Motor strength 5/5 B/L upper and lower extremities  CHEST/LUNG: Clear to auscultation bilaterally, No rales, rhonchi, wheezing, or rubs  HEART: Regular rate and rhythm; No murmurs, rubs, or gallops  ABDOMEN: Soft, Nontender, Nondistended; Bowel sounds present  EXTREMITIES: 2+ Peripheral Pulses, 1+ pitting edema of ankles b/l, bandaged left foot    Care Discussed with Consultants/Other Providers [ x] YES  [ ] NO

## 2019-06-04 NOTE — PROGRESS NOTE ADULT - PROBLEM SELECTOR PLAN 1
first distal metatarsal osteomyelitis noted on MRI.   - POD 7 from L foot hallux amputation and partial ray resection (5/28).  - blood and urine cx neg  - tissue cx 1st metatarsal staph and coag neg staph   - tissue cx great toe- neg  fu surgical path- clear margins; abx discontinued  ID consult- my for abx choice and duration of treatment, fu rec  podiatry: [facility]  - pain control with percocet  -dispo planning to [facility]

## 2019-06-04 NOTE — PROGRESS NOTE ADULT - PROBLEM SELECTOR PLAN 8
-transfuse 1 unit prbc yesterday. hemodynamically stable today. No clinical signs of bleeding -s/p 1 unit prbc. hemodynamically stable today. No clinical signs of bleeding

## 2019-06-04 NOTE — PROGRESS NOTE ADULT - ASSESSMENT
48 year old male with PMH of  HTN and DM2, who presented with Perforated Antral Gastric Ulcer, s/p Emergent Exp-lap Oomentopexy and plication 5/11. Found to have new onset afib with elevated troponin post-operatively indicative of NSTEMI, started on Amiodarone and converted to sinus rhythm, left foot osteo s/p debridement OR 5/28. post op day 6 48 year old male with PMH of  HTN and DM2, who presented with Perforated Antral Gastric Ulcer, s/p Emergent Exp-lap Oomentopexy and plication 5/11. Found to have new onset afib with elevated troponin post-operatively indicative of NSTEMI, started on Amiodarone and converted to sinus rhythm, left foot osteo s/p debridement OR 5/28. post op day 7

## 2019-06-05 ENCOUNTER — TRANSCRIPTION ENCOUNTER (OUTPATIENT)
Age: 49
End: 2019-06-05

## 2019-06-05 ENCOUNTER — INPATIENT (INPATIENT)
Facility: HOSPITAL | Age: 49
LOS: 7 days | Discharge: NURSING, REHAB OR LONG-TERM CARE | DRG: 175 | End: 2019-06-13
Attending: INTERNAL MEDICINE | Admitting: INTERNAL MEDICINE
Payer: COMMERCIAL

## 2019-06-05 VITALS
OXYGEN SATURATION: 96 % | HEART RATE: 70 BPM | RESPIRATION RATE: 18 BRPM | DIASTOLIC BLOOD PRESSURE: 76 MMHG | TEMPERATURE: 100 F | SYSTOLIC BLOOD PRESSURE: 140 MMHG | WEIGHT: 179.9 LBS

## 2019-06-05 VITALS
RESPIRATION RATE: 17 BRPM | TEMPERATURE: 98 F | DIASTOLIC BLOOD PRESSURE: 66 MMHG | HEART RATE: 59 BPM | SYSTOLIC BLOOD PRESSURE: 127 MMHG | OXYGEN SATURATION: 95 %

## 2019-06-05 LAB
ALBUMIN SERPL ELPH-MCNC: 2.9 G/DL — LOW (ref 3.3–5)
ALP SERPL-CCNC: 118 U/L — SIGNIFICANT CHANGE UP (ref 40–120)
ALT FLD-CCNC: 17 U/L — SIGNIFICANT CHANGE UP (ref 12–78)
ANION GAP SERPL CALC-SCNC: 6 MMOL/L — SIGNIFICANT CHANGE UP (ref 5–17)
ANION GAP SERPL CALC-SCNC: 7 MMOL/L — SIGNIFICANT CHANGE UP (ref 5–17)
AST SERPL-CCNC: 18 U/L — SIGNIFICANT CHANGE UP (ref 15–37)
BILIRUB SERPL-MCNC: 0.2 MG/DL — SIGNIFICANT CHANGE UP (ref 0.2–1.2)
BUN SERPL-MCNC: 16 MG/DL — SIGNIFICANT CHANGE UP (ref 7–23)
BUN SERPL-MCNC: 16 MG/DL — SIGNIFICANT CHANGE UP (ref 7–23)
CALCIUM SERPL-MCNC: 8.1 MG/DL — LOW (ref 8.5–10.1)
CALCIUM SERPL-MCNC: 8.3 MG/DL — LOW (ref 8.5–10.1)
CHLORIDE SERPL-SCNC: 103 MMOL/L — SIGNIFICANT CHANGE UP (ref 96–108)
CHLORIDE SERPL-SCNC: 107 MMOL/L — SIGNIFICANT CHANGE UP (ref 96–108)
CO2 SERPL-SCNC: 27 MMOL/L — SIGNIFICANT CHANGE UP (ref 22–31)
CO2 SERPL-SCNC: 29 MMOL/L — SIGNIFICANT CHANGE UP (ref 22–31)
CREAT SERPL-MCNC: 1.3 MG/DL — SIGNIFICANT CHANGE UP (ref 0.5–1.3)
CREAT SERPL-MCNC: 1.5 MG/DL — HIGH (ref 0.5–1.3)
D DIMER BLD IA.RAPID-MCNC: 660 NG/ML DDU — HIGH
GLUCOSE SERPL-MCNC: 110 MG/DL — HIGH (ref 70–99)
GLUCOSE SERPL-MCNC: 196 MG/DL — HIGH (ref 70–99)
HCT VFR BLD CALC: 27.6 % — LOW (ref 39–50)
HCT VFR BLD CALC: 31.4 % — LOW (ref 39–50)
HGB BLD-MCNC: 8.6 G/DL — LOW (ref 13–17)
HGB BLD-MCNC: 9.9 G/DL — LOW (ref 13–17)
MCHC RBC-ENTMCNC: 30.6 PG — SIGNIFICANT CHANGE UP (ref 27–34)
MCHC RBC-ENTMCNC: 30.9 PG — SIGNIFICANT CHANGE UP (ref 27–34)
MCHC RBC-ENTMCNC: 31.2 GM/DL — LOW (ref 32–36)
MCHC RBC-ENTMCNC: 31.5 GM/DL — LOW (ref 32–36)
MCV RBC AUTO: 98.1 FL — SIGNIFICANT CHANGE UP (ref 80–100)
MCV RBC AUTO: 98.2 FL — SIGNIFICANT CHANGE UP (ref 80–100)
NRBC # BLD: 0 /100 WBCS — SIGNIFICANT CHANGE UP (ref 0–0)
NRBC # BLD: 0 /100 WBCS — SIGNIFICANT CHANGE UP (ref 0–0)
NT-PROBNP SERPL-SCNC: 614 PG/ML — HIGH (ref 0–125)
PLATELET # BLD AUTO: 325 K/UL — SIGNIFICANT CHANGE UP (ref 150–400)
PLATELET # BLD AUTO: 366 K/UL — SIGNIFICANT CHANGE UP (ref 150–400)
POTASSIUM SERPL-MCNC: 3.9 MMOL/L — SIGNIFICANT CHANGE UP (ref 3.5–5.3)
POTASSIUM SERPL-MCNC: 4.5 MMOL/L — SIGNIFICANT CHANGE UP (ref 3.5–5.3)
POTASSIUM SERPL-SCNC: 3.9 MMOL/L — SIGNIFICANT CHANGE UP (ref 3.5–5.3)
POTASSIUM SERPL-SCNC: 4.5 MMOL/L — SIGNIFICANT CHANGE UP (ref 3.5–5.3)
PROT SERPL-MCNC: 6.7 G/DL — SIGNIFICANT CHANGE UP (ref 6–8.3)
RBC # BLD: 2.81 M/UL — LOW (ref 4.2–5.8)
RBC # BLD: 3.2 M/UL — LOW (ref 4.2–5.8)
RBC # FLD: 13.8 % — SIGNIFICANT CHANGE UP (ref 10.3–14.5)
RBC # FLD: 13.9 % — SIGNIFICANT CHANGE UP (ref 10.3–14.5)
SODIUM SERPL-SCNC: 137 MMOL/L — SIGNIFICANT CHANGE UP (ref 135–145)
SODIUM SERPL-SCNC: 142 MMOL/L — SIGNIFICANT CHANGE UP (ref 135–145)
TROPONIN I SERPL-MCNC: 0.02 NG/ML — SIGNIFICANT CHANGE UP (ref 0.01–0.04)
TROPONIN I SERPL-MCNC: 0.02 NG/ML — SIGNIFICANT CHANGE UP (ref 0.01–0.04)
WBC # BLD: 6.5 K/UL — SIGNIFICANT CHANGE UP (ref 3.8–10.5)
WBC # BLD: 8.34 K/UL — SIGNIFICANT CHANGE UP (ref 3.8–10.5)
WBC # FLD AUTO: 6.5 K/UL — SIGNIFICANT CHANGE UP (ref 3.8–10.5)
WBC # FLD AUTO: 8.34 K/UL — SIGNIFICANT CHANGE UP (ref 3.8–10.5)

## 2019-06-05 PROCEDURE — 71045 X-RAY EXAM CHEST 1 VIEW: CPT | Mod: 26

## 2019-06-05 PROCEDURE — 93010 ELECTROCARDIOGRAM REPORT: CPT | Mod: 76

## 2019-06-05 PROCEDURE — 99232 SBSQ HOSP IP/OBS MODERATE 35: CPT

## 2019-06-05 RX ORDER — FAMOTIDINE 10 MG/ML
20 INJECTION INTRAVENOUS ONCE
Refills: 0 | Status: COMPLETED | OUTPATIENT
Start: 2019-06-05 | End: 2019-06-05

## 2019-06-05 RX ORDER — INSULIN DETEMIR 100/ML (3)
10 INSULIN PEN (ML) SUBCUTANEOUS
Qty: 1 | Refills: 0
Start: 2019-06-05

## 2019-06-05 RX ORDER — ACETAMINOPHEN 500 MG
2 TABLET ORAL
Qty: 0 | Refills: 0 | DISCHARGE
Start: 2019-06-05

## 2019-06-05 RX ORDER — SODIUM CHLORIDE 9 MG/ML
1000 INJECTION INTRAMUSCULAR; INTRAVENOUS; SUBCUTANEOUS ONCE
Refills: 0 | Status: COMPLETED | OUTPATIENT
Start: 2019-06-05 | End: 2019-06-05

## 2019-06-05 RX ORDER — LIDOCAINE 4 G/100G
10 CREAM TOPICAL ONCE
Refills: 0 | Status: COMPLETED | OUTPATIENT
Start: 2019-06-05 | End: 2019-06-05

## 2019-06-05 RX ORDER — TAMSULOSIN HYDROCHLORIDE 0.4 MG/1
1 CAPSULE ORAL
Qty: 30 | Refills: 0
Start: 2019-06-05 | End: 2019-07-04

## 2019-06-05 RX ADMIN — Medication 100 MILLIGRAM(S): at 05:35

## 2019-06-05 RX ADMIN — ONDANSETRON 4 MILLIGRAM(S): 8 TABLET, FILM COATED ORAL at 00:31

## 2019-06-05 RX ADMIN — Medication 3 UNIT(S): at 08:01

## 2019-06-05 RX ADMIN — Medication 3 UNIT(S): at 12:00

## 2019-06-05 RX ADMIN — SODIUM CHLORIDE 1000 MILLILITER(S): 9 INJECTION INTRAMUSCULAR; INTRAVENOUS; SUBCUTANEOUS at 18:25

## 2019-06-05 RX ADMIN — SODIUM CHLORIDE 1000 MILLILITER(S): 9 INJECTION INTRAMUSCULAR; INTRAVENOUS; SUBCUTANEOUS at 23:36

## 2019-06-05 RX ADMIN — Medication 30 MILLILITER(S): at 18:24

## 2019-06-05 RX ADMIN — OXYCODONE AND ACETAMINOPHEN 2 TABLET(S): 5; 325 TABLET ORAL at 00:31

## 2019-06-05 RX ADMIN — Medication 1 PATCH: at 12:00

## 2019-06-05 RX ADMIN — LIDOCAINE 10 MILLILITER(S): 4 CREAM TOPICAL at 18:24

## 2019-06-05 RX ADMIN — Medication 1 PATCH: at 12:10

## 2019-06-05 RX ADMIN — AMIODARONE HYDROCHLORIDE 200 MILLIGRAM(S): 400 TABLET ORAL at 05:35

## 2019-06-05 RX ADMIN — OXYCODONE AND ACETAMINOPHEN 2 TABLET(S): 5; 325 TABLET ORAL at 01:14

## 2019-06-05 RX ADMIN — PANTOPRAZOLE SODIUM 40 MILLIGRAM(S): 20 TABLET, DELAYED RELEASE ORAL at 05:35

## 2019-06-05 RX ADMIN — Medication 1 PATCH: at 08:04

## 2019-06-05 RX ADMIN — FAMOTIDINE 20 MILLIGRAM(S): 10 INJECTION INTRAVENOUS at 18:24

## 2019-06-05 NOTE — PROGRESS NOTE ADULT - PROVIDER SPECIALTY LIST ADULT
Anesthesia
Cardiology
Critical Care
Endocrinology
Infectious Disease
Internal Medicine
MICU
Podiatry
Surgery
Anesthesia
Cardiology
Internal Medicine
MICU
Surgery
Surgery
Cardiology
Critical Care
Podiatry
Endocrinology
Internal Medicine

## 2019-06-05 NOTE — PROGRESS NOTE ADULT - PROBLEM SELECTOR PLAN 1
cont lantus 10 units qhs  cont humalog 3 units 3x/day before meals  cont humalog scale coverage qac/qhs  goal bg 100-180 in hosp setting  pt to f/u in office today for insulin samples/teaching

## 2019-06-05 NOTE — PROGRESS NOTE ADULT - SUBJECTIVE AND OBJECTIVE BOX
CAPILLARY BLOOD GLUCOSE      POCT Blood Glucose.: 110 mg/dL (05 Jun 2019 07:28)  POCT Blood Glucose.: 151 mg/dL (04 Jun 2019 21:22)  POCT Blood Glucose.: 157 mg/dL (04 Jun 2019 16:53)  POCT Blood Glucose.: 134 mg/dL (04 Jun 2019 12:07)      Vital Signs Last 24 Hrs  T(C): 37.1 (05 Jun 2019 07:43), Max: 37.1 (05 Jun 2019 07:43)  T(F): 98.7 (05 Jun 2019 07:43), Max: 98.7 (05 Jun 2019 07:43)  HR: 56 (05 Jun 2019 07:43) (50 - 68)  BP: 116/56 (05 Jun 2019 07:43) (102/66 - 137/76)  BP(mean): --  RR: 17 (05 Jun 2019 07:43) (17 - 18)  SpO2: 94% (05 Jun 2019 07:43) (94% - 99%)    General: WN/WD NAD  Respiratory: CTA B/L  CV: RRR, S1S2, no murmurs, rubs or gallops  Abdominal: Soft, NT, ND +BS, Last BM  Extremities: b/l le foot dsg intact     06-05    142  |  107  |  16  ----------------------------<  110<H>  3.9   |  29  |  1.30    Ca    8.1<L>      05 Jun 2019 05:28        dextrose 40% Gel 15 Gram(s) Oral once PRN  dextrose 50% Injectable 12.5 Gram(s) IV Push once  dextrose 50% Injectable 25 Gram(s) IV Push once  glucagon  Injectable 1 milliGRAM(s) IntraMuscular once PRN  insulin glargine Injectable (LANTUS) 10 Unit(s) SubCutaneous at bedtime  insulin lispro (HumaLOG) corrective regimen sliding scale   SubCutaneous Before meals and at bedtime  insulin lispro Injectable (HumaLOG) 3 Unit(s) SubCutaneous three times a day before meals

## 2019-06-05 NOTE — PROGRESS NOTE ADULT - ASSESSMENT
48 year old male with HTN and DM2, who presented with Perforated Antral Gastric Ulcer, POD 5 s/p Emergent Exp-lap Oomentopexy and plication. Tolerated procedure well, however, had Afib with RVR  postop, spontaneously to NSR which was new. He had an elevated troponin possibly suggestive of non-ST segment elevation myocardial infarction/demand ischemia. He has no known history of coronary artery disease    Afib  - Per flow sheet, HR has been controlled  - Continue Amio, plan is to stop Amio as an outpatient and start bb  - TTE with normal LV function, moderate MR   - No need for long term AC as Afib was short lived   - Maintain  K>4, Mg>2  - Recommend an event monitor as outpatient.    Anemia  - he remains hemodynamically stable with no overt signs of bleeding  - As per primary team  - Off full dose AC    Left foot OM  - MRI with left foot osteomyelitis and now S/P left hallux resection with debridement tolerated procedure w/o cardiac compromise   - ID following.  no need for abx.     - All other workup per Primary. Will continue to follow   DC planning per primary team.

## 2019-06-05 NOTE — ED PROVIDER NOTE - OBJECTIVE STATEMENT
chest pressure and dizzy around 1500 today while sitting down.  Chest pressure and dizziness have improved.   pt was dc from here about 1330 today.  no other complaints. chest pressure and dizzy around 1500 today while sitting down.  Chest pressure and dizziness have improved.   pt was dc from here about 1330 today.  no other complaints.  pmd Linette Rico in [place]

## 2019-06-05 NOTE — PROGRESS NOTE ADULT - SUBJECTIVE AND OBJECTIVE BOX
[facility] - Bolivar Carbajal, El, Brittany, Zahra, Román Jacobs  Office Number:  [phone]    Patient resting comfortably in bed in NAD.  Laying flat with no respiratory distress.  No complaints of chest pain, dyspnea, palpitations, PND, or orthopnea.    ROS: negative unless otherwise mentioned.    Telemetry:  off    MEDICATIONS  (STANDING):  amiodarone    Tablet 200 milliGRAM(s) Oral daily  dextrose 5%. 1000 milliLiter(s) (50 mL/Hr) IV Continuous <Continuous>  dextrose 50% Injectable 12.5 Gram(s) IV Push once  dextrose 50% Injectable 25 Gram(s) IV Push once  docusate sodium 100 milliGRAM(s) Oral three times a day  enoxaparin Injectable 40 milliGRAM(s) SubCutaneous at bedtime  insulin glargine Injectable (LANTUS) 10 Unit(s) SubCutaneous at bedtime  insulin lispro (HumaLOG) corrective regimen sliding scale   SubCutaneous Before meals and at bedtime  insulin lispro Injectable (HumaLOG) 3 Unit(s) SubCutaneous three times a day before meals  nicotine -  14 mG/24Hr(s) Patch 1 patch Transdermal daily  pantoprazole    Tablet 40 milliGRAM(s) Oral two times a day  polyethylene glycol 3350 17 Gram(s) Oral at bedtime  senna 2 Tablet(s) Oral at bedtime  tamsulosin 0.8 milliGRAM(s) Oral at bedtime    MEDICATIONS  (PRN):  acetaminophen   Tablet .. 650 milliGRAM(s) Oral every 6 hours PRN Mild Pain (1 - 3)  aluminum hydroxide/magnesium hydroxide/simethicone Suspension 30 milliLiter(s) Oral every 4 hours PRN Dyspepsia  dextrose 40% Gel 15 Gram(s) Oral once PRN Blood Glucose LESS THAN 70 milliGRAM(s)/deciliter  glucagon  Injectable 1 milliGRAM(s) IntraMuscular once PRN Glucose LESS THAN 70 milligrams/deciliter  nitroglycerin     SubLingual 0.4 milliGRAM(s) SubLingual every 5 minutes PRN Chest Pain  ondansetron Injectable 4 milliGRAM(s) IV Push every 8 hours PRN Nausea and/or Vomiting  oxyCODONE    5 mG/acetaminophen 325 mG 1 Tablet(s) Oral every 4 hours PRN Moderate Pain (4 - 6)  oxyCODONE    5 mG/acetaminophen 325 mG 2 Tablet(s) Oral every 6 hours PRN Severe Pain (7 - 10)      Allergies    No Known Allergies    Intolerances        Vital Signs Last 24 Hrs  T(C): 37.1 (05 Jun 2019 07:43), Max: 37.1 (05 Jun 2019 07:43)  T(F): 98.7 (05 Jun 2019 07:43), Max: 98.7 (05 Jun 2019 07:43)  HR: 56 (05 Jun 2019 07:43) (50 - 68)  BP: 116/56 (05 Jun 2019 07:43) (102/66 - 137/76)  BP(mean): --  RR: 17 (05 Jun 2019 07:43) (17 - 18)  SpO2: 94% (05 Jun 2019 07:43) (94% - 99%)    I&O's Summary    04 Jun 2019 07:01  -  05 Jun 2019 07:00  --------------------------------------------------------  IN: 340 mL / OUT: 700 mL / NET: -360 mL        ON EXAM:    Constitutional: NAD, awake and alert, well-developed  HEENT: Moist Mucous Membranes, Anicteric  Pulmonary: Non-labored, breath sounds are clear bilaterally, No wheezing, crackles or rhonchi  Cardiovascular: Regular, S1 and S2 nl, + sys murmur at andi/llsb,  No rubs, gallops or clicks   Gastrointestinal: Bowel Sounds present, soft, nontender.   Lymph: No lymphadenopathy. No peripheral edema.  Skin: No visible rashes or ulcers; foot wrapped  Psych:  Mood & affect appropriate    LABS: All Labs Reviewed:                        8.6    6.50  )-----------( 325      ( 05 Jun 2019 05:28 )             27.6                         8.7    5.93  )-----------( 344      ( 04 Jun 2019 07:24 )             27.7                         8.5    5.86  )-----------( 347      ( 03 Jun 2019 06:14 )             27.2     05 Jun 2019 05:28    142    |  107    |  16     ----------------------------<  110    3.9     |  29     |  1.30   04 Jun 2019 07:24    142    |  106    |  15     ----------------------------<  120    3.9     |  29     |  1.20   03 Jun 2019 06:14    143    |  107    |  13     ----------------------------<  117    4.3     |  32     |  1.30     Ca    8.1        05 Jun 2019 05:28  Ca    8.4        04 Jun 2019 07:24  Ca    8.4        03 Jun 2019 06:14            Blood Culture:

## 2019-06-05 NOTE — PROGRESS NOTE ADULT - REASON FOR ADMISSION
perforated stomach ulcer

## 2019-06-05 NOTE — DISCHARGE NOTE NURSING/CASE MANAGEMENT/SOCIAL WORK - [email or web address]_GEN_ALL_CORE
You can access the [facility] Patient Portal, offered by [facility], by registering with the following website: [email or web address]

## 2019-06-05 NOTE — ED PROVIDER NOTE - NOTES
spoke with dr pool, pt is cleared to be started on anticoagulation but needs protonix 40mg Q 12 hours

## 2019-06-05 NOTE — ED ADULT NURSE NOTE - OBJECTIVE STATEMENT
47 y/o male comes in A&Ox4 from home with complaints of chest pressure. States he was just discharged from main hospital 4 hours ago. When he got home he started to feel this chest pressure. Denies chest pain, nausea, vomiting, back pain, jaw pain, numbness or tingling. EKG obtained and cardiac monitor in place. PIV started and blood sent. Plan of care discussed with patient. Comfort and safety maintained. Will continue to monitor.

## 2019-06-05 NOTE — PROGRESS NOTE ADULT - PROBLEM SELECTOR PROBLEM 1
Diabetes mellitus type 2, uncontrolled
Osteomyelitis of left foot
Acute gastric ulcer with perforation
Diabetes mellitus type 2, uncontrolled
Osteomyelitis of left foot
Acute gastric ulcer with perforation
Osteomyelitis of left foot
Acute gastric ulcer with perforation
Osteomyelitis of left foot

## 2019-06-05 NOTE — ED ADULT NURSE NOTE - PMH
Afib    BPH (benign prostatic hyperplasia)    Diabetes mellitus with no complication    Hypertension

## 2019-06-05 NOTE — ED ADULT NURSE NOTE - CHPI ED NUR SYMPTOMS NEG
no nausea/no vomiting/no shortness of breath/no congestion/no dizziness/no fever/no syncope/no chills/no back pain/no chest pain/no diaphoresis

## 2019-06-06 ENCOUNTER — TRANSCRIPTION ENCOUNTER (OUTPATIENT)
Age: 49
End: 2019-06-06

## 2019-06-06 DIAGNOSIS — D64.9 ANEMIA, UNSPECIFIED: ICD-10-CM

## 2019-06-06 DIAGNOSIS — E11.65 TYPE 2 DIABETES MELLITUS WITH HYPERGLYCEMIA: ICD-10-CM

## 2019-06-06 DIAGNOSIS — Z98.890 OTHER SPECIFIED POSTPROCEDURAL STATES: Chronic | ICD-10-CM

## 2019-06-06 DIAGNOSIS — K27.5 CHRONIC OR UNSPECIFIED PEPTIC ULCER, SITE UNSPECIFIED, WITH PERFORATION: ICD-10-CM

## 2019-06-06 DIAGNOSIS — Z29.9 ENCOUNTER FOR PROPHYLACTIC MEASURES, UNSPECIFIED: ICD-10-CM

## 2019-06-06 DIAGNOSIS — R09.89 OTHER SPECIFIED SYMPTOMS AND SIGNS INVOLVING THE CIRCULATORY AND RESPIRATORY SYSTEMS: ICD-10-CM

## 2019-06-06 DIAGNOSIS — R91.8 OTHER NONSPECIFIC ABNORMAL FINDING OF LUNG FIELD: ICD-10-CM

## 2019-06-06 DIAGNOSIS — I10 ESSENTIAL (PRIMARY) HYPERTENSION: ICD-10-CM

## 2019-06-06 DIAGNOSIS — N17.9 ACUTE KIDNEY FAILURE, UNSPECIFIED: ICD-10-CM

## 2019-06-06 DIAGNOSIS — E11.9 TYPE 2 DIABETES MELLITUS WITHOUT COMPLICATIONS: ICD-10-CM

## 2019-06-06 DIAGNOSIS — I26.99 OTHER PULMONARY EMBOLISM WITHOUT ACUTE COR PULMONALE: ICD-10-CM

## 2019-06-06 DIAGNOSIS — N40.0 BENIGN PROSTATIC HYPERPLASIA WITHOUT LOWER URINARY TRACT SYMPTOMS: ICD-10-CM

## 2019-06-06 DIAGNOSIS — I48.91 UNSPECIFIED ATRIAL FIBRILLATION: ICD-10-CM

## 2019-06-06 LAB
ANION GAP SERPL CALC-SCNC: 7 MMOL/L — SIGNIFICANT CHANGE UP (ref 5–17)
APPEARANCE UR: CLEAR — SIGNIFICANT CHANGE UP
APTT BLD: 26.5 SEC — LOW (ref 27.5–36.3)
APTT BLD: 63.1 SEC — HIGH (ref 28.5–37)
APTT BLD: 79 SEC — HIGH (ref 28.5–37)
BILIRUB UR-MCNC: NEGATIVE — SIGNIFICANT CHANGE UP
BUN SERPL-MCNC: 15 MG/DL — SIGNIFICANT CHANGE UP (ref 7–23)
CALCIUM SERPL-MCNC: 7.9 MG/DL — LOW (ref 8.5–10.1)
CHLORIDE SERPL-SCNC: 111 MMOL/L — HIGH (ref 96–108)
CK MB BLD-MCNC: 4.7 % — HIGH (ref 0–3.5)
CK MB BLD-MCNC: 5.1 % — HIGH (ref 0–3.5)
CK MB CFR SERPL CALC: 4.1 NG/ML — HIGH (ref 0–3.6)
CK MB CFR SERPL CALC: 4.3 NG/ML — HIGH (ref 0–3.6)
CK SERPL-CCNC: 81 U/L — SIGNIFICANT CHANGE UP (ref 26–308)
CK SERPL-CCNC: 91 U/L — SIGNIFICANT CHANGE UP (ref 26–308)
CO2 SERPL-SCNC: 26 MMOL/L — SIGNIFICANT CHANGE UP (ref 22–31)
COLOR SPEC: YELLOW — SIGNIFICANT CHANGE UP
CREAT SERPL-MCNC: 1.3 MG/DL — SIGNIFICANT CHANGE UP (ref 0.5–1.3)
DIFF PNL FLD: NEGATIVE — SIGNIFICANT CHANGE UP
GLUCOSE SERPL-MCNC: 193 MG/DL — HIGH (ref 70–99)
GLUCOSE UR QL: NEGATIVE — SIGNIFICANT CHANGE UP
HCT VFR BLD CALC: 26.7 % — LOW (ref 39–50)
HCT VFR BLD CALC: 27.5 % — LOW (ref 39–50)
HGB BLD-MCNC: 8.4 G/DL — LOW (ref 13–17)
HGB BLD-MCNC: 8.7 G/DL — LOW (ref 13–17)
INR BLD: 1.13 RATIO — SIGNIFICANT CHANGE UP (ref 0.88–1.16)
KETONES UR-MCNC: NEGATIVE — SIGNIFICANT CHANGE UP
LEUKOCYTE ESTERASE UR-ACNC: ABNORMAL
MCHC RBC-ENTMCNC: 31 PG — SIGNIFICANT CHANGE UP (ref 27–34)
MCHC RBC-ENTMCNC: 31 PG — SIGNIFICANT CHANGE UP (ref 27–34)
MCHC RBC-ENTMCNC: 31.5 GM/DL — LOW (ref 32–36)
MCHC RBC-ENTMCNC: 31.6 GM/DL — LOW (ref 32–36)
MCV RBC AUTO: 97.9 FL — SIGNIFICANT CHANGE UP (ref 80–100)
MCV RBC AUTO: 98.5 FL — SIGNIFICANT CHANGE UP (ref 80–100)
NITRITE UR-MCNC: NEGATIVE — SIGNIFICANT CHANGE UP
NRBC # BLD: 0 /100 WBCS — SIGNIFICANT CHANGE UP (ref 0–0)
NRBC # BLD: 0 /100 WBCS — SIGNIFICANT CHANGE UP (ref 0–0)
PCP SPEC-MCNC: SIGNIFICANT CHANGE UP
PH UR: 7 — SIGNIFICANT CHANGE UP (ref 5–8)
PLATELET # BLD AUTO: 306 K/UL — SIGNIFICANT CHANGE UP (ref 150–400)
PLATELET # BLD AUTO: 320 K/UL — SIGNIFICANT CHANGE UP (ref 150–400)
POTASSIUM SERPL-MCNC: 3.7 MMOL/L — SIGNIFICANT CHANGE UP (ref 3.5–5.3)
POTASSIUM SERPL-SCNC: 3.7 MMOL/L — SIGNIFICANT CHANGE UP (ref 3.5–5.3)
PROT UR-MCNC: NEGATIVE — SIGNIFICANT CHANGE UP
PROTHROM AB SERPL-ACNC: 12.8 SEC — SIGNIFICANT CHANGE UP (ref 10–12.9)
RBC # BLD: 2.71 M/UL — LOW (ref 4.2–5.8)
RBC # BLD: 2.81 M/UL — LOW (ref 4.2–5.8)
RBC # FLD: 13.9 % — SIGNIFICANT CHANGE UP (ref 10.3–14.5)
RBC # FLD: 14.3 % — SIGNIFICANT CHANGE UP (ref 10.3–14.5)
SODIUM SERPL-SCNC: 144 MMOL/L — SIGNIFICANT CHANGE UP (ref 135–145)
SP GR SPEC: 1 — LOW (ref 1.01–1.02)
TROPONIN I SERPL-MCNC: 0.02 NG/ML — SIGNIFICANT CHANGE UP (ref 0.01–0.04)
UROBILINOGEN FLD QL: NEGATIVE — SIGNIFICANT CHANGE UP
WBC # BLD: 6.71 K/UL — SIGNIFICANT CHANGE UP (ref 3.8–10.5)
WBC # BLD: 6.93 K/UL — SIGNIFICANT CHANGE UP (ref 3.8–10.5)
WBC # FLD AUTO: 6.71 K/UL — SIGNIFICANT CHANGE UP (ref 3.8–10.5)
WBC # FLD AUTO: 6.93 K/UL — SIGNIFICANT CHANGE UP (ref 3.8–10.5)

## 2019-06-06 PROCEDURE — 93010 ELECTROCARDIOGRAM REPORT: CPT | Mod: 77

## 2019-06-06 PROCEDURE — 93970 EXTREMITY STUDY: CPT | Mod: 26

## 2019-06-06 PROCEDURE — 99223 1ST HOSP IP/OBS HIGH 75: CPT

## 2019-06-06 PROCEDURE — 99285 EMERGENCY DEPT VISIT HI MDM: CPT

## 2019-06-06 PROCEDURE — 71275 CT ANGIOGRAPHY CHEST: CPT | Mod: 26

## 2019-06-06 RX ORDER — INSULIN LISPRO 100/ML
3 VIAL (ML) SUBCUTANEOUS
Refills: 0 | Status: DISCONTINUED | OUTPATIENT
Start: 2019-06-06 | End: 2019-06-13

## 2019-06-06 RX ORDER — NITROGLYCERIN 6.5 MG
0.4 CAPSULE, EXTENDED RELEASE ORAL
Refills: 0 | Status: COMPLETED | OUTPATIENT
Start: 2019-06-06 | End: 2019-06-06

## 2019-06-06 RX ORDER — PANTOPRAZOLE SODIUM 20 MG/1
40 TABLET, DELAYED RELEASE ORAL
Refills: 0 | Status: DISCONTINUED | OUTPATIENT
Start: 2019-06-06 | End: 2019-06-13

## 2019-06-06 RX ORDER — ALBUTEROL 90 UG/1
2 AEROSOL, METERED ORAL EVERY 6 HOURS
Refills: 0 | Status: DISCONTINUED | OUTPATIENT
Start: 2019-06-06 | End: 2019-06-13

## 2019-06-06 RX ORDER — DEXTROSE 50 % IN WATER 50 %
25 SYRINGE (ML) INTRAVENOUS ONCE
Refills: 0 | Status: DISCONTINUED | OUTPATIENT
Start: 2019-06-06 | End: 2019-06-13

## 2019-06-06 RX ORDER — HEPARIN SODIUM 5000 [USP'U]/ML
3000 INJECTION INTRAVENOUS; SUBCUTANEOUS EVERY 6 HOURS
Refills: 0 | Status: DISCONTINUED | OUTPATIENT
Start: 2019-06-06 | End: 2019-06-10

## 2019-06-06 RX ORDER — HEPARIN SODIUM 5000 [USP'U]/ML
6500 INJECTION INTRAVENOUS; SUBCUTANEOUS EVERY 6 HOURS
Refills: 0 | Status: DISCONTINUED | OUTPATIENT
Start: 2019-06-06 | End: 2019-06-10

## 2019-06-06 RX ORDER — HEPARIN SODIUM 5000 [USP'U]/ML
INJECTION INTRAVENOUS; SUBCUTANEOUS
Qty: 25000 | Refills: 0 | Status: DISCONTINUED | OUTPATIENT
Start: 2019-06-06 | End: 2019-06-10

## 2019-06-06 RX ORDER — SENNA PLUS 8.6 MG/1
2 TABLET ORAL AT BEDTIME
Refills: 0 | Status: DISCONTINUED | OUTPATIENT
Start: 2019-06-06 | End: 2019-06-13

## 2019-06-06 RX ORDER — DEXTROSE 50 % IN WATER 50 %
12.5 SYRINGE (ML) INTRAVENOUS ONCE
Refills: 0 | Status: DISCONTINUED | OUTPATIENT
Start: 2019-06-06 | End: 2019-06-13

## 2019-06-06 RX ORDER — NICOTINE POLACRILEX 2 MG
1 GUM BUCCAL DAILY
Refills: 0 | Status: DISCONTINUED | OUTPATIENT
Start: 2019-06-06 | End: 2019-06-06

## 2019-06-06 RX ORDER — TAMSULOSIN HYDROCHLORIDE 0.4 MG/1
0.4 CAPSULE ORAL AT BEDTIME
Refills: 0 | Status: DISCONTINUED | OUTPATIENT
Start: 2019-06-06 | End: 2019-06-13

## 2019-06-06 RX ORDER — INSULIN LISPRO 100/ML
VIAL (ML) SUBCUTANEOUS
Refills: 0 | Status: DISCONTINUED | OUTPATIENT
Start: 2019-06-06 | End: 2019-06-13

## 2019-06-06 RX ORDER — SODIUM CHLORIDE 9 MG/ML
1000 INJECTION, SOLUTION INTRAVENOUS
Refills: 0 | Status: DISCONTINUED | OUTPATIENT
Start: 2019-06-06 | End: 2019-06-13

## 2019-06-06 RX ORDER — NICOTINE POLACRILEX 2 MG
1 GUM BUCCAL DAILY
Refills: 0 | Status: DISCONTINUED | OUTPATIENT
Start: 2019-06-06 | End: 2019-06-13

## 2019-06-06 RX ORDER — ACETAMINOPHEN 500 MG
650 TABLET ORAL ONCE
Refills: 0 | Status: COMPLETED | OUTPATIENT
Start: 2019-06-06 | End: 2019-06-06

## 2019-06-06 RX ORDER — INSULIN GLARGINE 100 [IU]/ML
10 INJECTION, SOLUTION SUBCUTANEOUS AT BEDTIME
Refills: 0 | Status: DISCONTINUED | OUTPATIENT
Start: 2019-06-06 | End: 2019-06-13

## 2019-06-06 RX ORDER — GLUCAGON INJECTION, SOLUTION 0.5 MG/.1ML
1 INJECTION, SOLUTION SUBCUTANEOUS ONCE
Refills: 0 | Status: DISCONTINUED | OUTPATIENT
Start: 2019-06-06 | End: 2019-06-13

## 2019-06-06 RX ORDER — POLYETHYLENE GLYCOL 3350 17 G/17G
17 POWDER, FOR SOLUTION ORAL AT BEDTIME
Refills: 0 | Status: DISCONTINUED | OUTPATIENT
Start: 2019-06-06 | End: 2019-06-13

## 2019-06-06 RX ORDER — DOCUSATE SODIUM 100 MG
100 CAPSULE ORAL THREE TIMES A DAY
Refills: 0 | Status: DISCONTINUED | OUTPATIENT
Start: 2019-06-06 | End: 2019-06-13

## 2019-06-06 RX ORDER — MORPHINE SULFATE 50 MG/1
2 CAPSULE, EXTENDED RELEASE ORAL ONCE
Refills: 0 | Status: DISCONTINUED | OUTPATIENT
Start: 2019-06-06 | End: 2019-06-06

## 2019-06-06 RX ORDER — AMIODARONE HYDROCHLORIDE 400 MG/1
200 TABLET ORAL DAILY
Refills: 0 | Status: DISCONTINUED | OUTPATIENT
Start: 2019-06-06 | End: 2019-06-08

## 2019-06-06 RX ORDER — ASPIRIN/CALCIUM CARB/MAGNESIUM 324 MG
325 TABLET ORAL ONCE
Refills: 0 | Status: COMPLETED | OUTPATIENT
Start: 2019-06-06 | End: 2019-06-06

## 2019-06-06 RX ORDER — PANTOPRAZOLE SODIUM 20 MG/1
40 TABLET, DELAYED RELEASE ORAL ONCE
Refills: 0 | Status: COMPLETED | OUTPATIENT
Start: 2019-06-06 | End: 2019-06-06

## 2019-06-06 RX ORDER — HEPARIN SODIUM 5000 [USP'U]/ML
6500 INJECTION INTRAVENOUS; SUBCUTANEOUS ONCE
Refills: 0 | Status: COMPLETED | OUTPATIENT
Start: 2019-06-06 | End: 2019-06-06

## 2019-06-06 RX ORDER — INSULIN LISPRO 100/ML
VIAL (ML) SUBCUTANEOUS AT BEDTIME
Refills: 0 | Status: DISCONTINUED | OUTPATIENT
Start: 2019-06-06 | End: 2019-06-13

## 2019-06-06 RX ORDER — DEXTROSE 50 % IN WATER 50 %
15 SYRINGE (ML) INTRAVENOUS ONCE
Refills: 0 | Status: DISCONTINUED | OUTPATIENT
Start: 2019-06-06 | End: 2019-06-13

## 2019-06-06 RX ORDER — ATORVASTATIN CALCIUM 80 MG/1
80 TABLET, FILM COATED ORAL AT BEDTIME
Refills: 0 | Status: DISCONTINUED | OUTPATIENT
Start: 2019-06-06 | End: 2019-06-13

## 2019-06-06 RX ADMIN — HEPARIN SODIUM 1500 UNIT(S)/HR: 5000 INJECTION INTRAVENOUS; SUBCUTANEOUS at 02:19

## 2019-06-06 RX ADMIN — Medication 0.4 MILLIGRAM(S): at 10:55

## 2019-06-06 RX ADMIN — HEPARIN SODIUM 6500 UNIT(S): 5000 INJECTION INTRAVENOUS; SUBCUTANEOUS at 02:20

## 2019-06-06 RX ADMIN — Medication 0.4 MILLIGRAM(S): at 11:06

## 2019-06-06 RX ADMIN — Medication 0.4 MILLIGRAM(S): at 11:00

## 2019-06-06 RX ADMIN — Medication 650 MILLIGRAM(S): at 05:29

## 2019-06-06 RX ADMIN — MORPHINE SULFATE 2 MILLIGRAM(S): 50 CAPSULE, EXTENDED RELEASE ORAL at 11:16

## 2019-06-06 RX ADMIN — SENNA PLUS 2 TABLET(S): 8.6 TABLET ORAL at 21:53

## 2019-06-06 RX ADMIN — Medication 3 UNIT(S): at 17:37

## 2019-06-06 RX ADMIN — Medication 2: at 07:46

## 2019-06-06 RX ADMIN — HEPARIN SODIUM 1500 UNIT(S)/HR: 5000 INJECTION INTRAVENOUS; SUBCUTANEOUS at 08:18

## 2019-06-06 RX ADMIN — Medication 1 PATCH: at 11:28

## 2019-06-06 RX ADMIN — POLYETHYLENE GLYCOL 3350 17 GRAM(S): 17 POWDER, FOR SOLUTION ORAL at 21:52

## 2019-06-06 RX ADMIN — INSULIN GLARGINE 10 UNIT(S): 100 INJECTION, SOLUTION SUBCUTANEOUS at 21:53

## 2019-06-06 RX ADMIN — Medication 1: at 17:37

## 2019-06-06 RX ADMIN — PANTOPRAZOLE SODIUM 40 MILLIGRAM(S): 20 TABLET, DELAYED RELEASE ORAL at 01:45

## 2019-06-06 RX ADMIN — AMIODARONE HYDROCHLORIDE 200 MILLIGRAM(S): 400 TABLET ORAL at 05:29

## 2019-06-06 RX ADMIN — PANTOPRAZOLE SODIUM 40 MILLIGRAM(S): 20 TABLET, DELAYED RELEASE ORAL at 17:44

## 2019-06-06 RX ADMIN — Medication 3 UNIT(S): at 11:28

## 2019-06-06 RX ADMIN — HEPARIN SODIUM 1500 UNIT(S)/HR: 5000 INJECTION INTRAVENOUS; SUBCUTANEOUS at 14:55

## 2019-06-06 RX ADMIN — Medication 1 PATCH: at 19:12

## 2019-06-06 RX ADMIN — MORPHINE SULFATE 2 MILLIGRAM(S): 50 CAPSULE, EXTENDED RELEASE ORAL at 11:32

## 2019-06-06 RX ADMIN — Medication 325 MILLIGRAM(S): at 10:45

## 2019-06-06 RX ADMIN — Medication 100 MILLIGRAM(S): at 13:00

## 2019-06-06 RX ADMIN — ATORVASTATIN CALCIUM 80 MILLIGRAM(S): 80 TABLET, FILM COATED ORAL at 21:52

## 2019-06-06 RX ADMIN — Medication 100 MILLIGRAM(S): at 21:52

## 2019-06-06 RX ADMIN — Medication 3 UNIT(S): at 07:47

## 2019-06-06 RX ADMIN — TAMSULOSIN HYDROCHLORIDE 0.4 MILLIGRAM(S): 0.4 CAPSULE ORAL at 21:52

## 2019-06-06 RX ADMIN — Medication 650 MILLIGRAM(S): at 04:22

## 2019-06-06 RX ADMIN — SODIUM CHLORIDE 1000 MILLILITER(S): 9 INJECTION INTRAMUSCULAR; INTRAVENOUS; SUBCUTANEOUS at 01:33

## 2019-06-06 RX ADMIN — PANTOPRAZOLE SODIUM 40 MILLIGRAM(S): 20 TABLET, DELAYED RELEASE ORAL at 05:30

## 2019-06-06 NOTE — H&P ADULT - HISTORY OF PRESENT ILLNESS
47 yo M PMH Afib (on amiodarone), BPH, DM2, hx of NSTEMI, osteomyelitis of left foos s/p toe amputation presenting to the ED for chest pressure that started this afternoon. Of note, patient was discharged from [facility] this afternoon, and shortly after returning home, while at rest, patient felt 8/10 chest pressure that was non radiating. There was a short episode of SOB and dizziness but it resolved shortly afterwards but the chest pressure continued. Patient denies headaches, chest pain, abdominal pain, n/v/d or LE pain.     In the ED:  vitals WNL  labs significant for Cr 1.5, d-dimer 660, rBNP 614  EKG-   CTA- subsegmental right upper lobe PE. questionable right middle lobe subsegmental PE.  Scattered pulmonary nodules 4mm    Heperain drip, 6500 units IV push x1, 2L NS bolus, 40mg pantoprazole IV push x1, 20mg pepcid x1 given in the ED 49 yo M PMH Afib (on amiodarone), BPH, DM2, hx of NSTEMI, osteomyelitis of left foos s/p toe amputation presenting to the ED for chest pressure that started this afternoon. Of note, patient was discharged from [facility] this afternoon, and shortly after returning home, while at rest, patient felt 8/10 chest pressure that was non radiating. There was a short episode of SOB and dizziness but it resolved shortly afterwards but the chest pressure continued. Patient denies headaches, chest pain, abdominal pain, n/v/d or LE pain.     In the ED:  vitals WNL  labs significant for Cr 1.5, d-dimer 660, rBNP 614  EKG- NSR rate 60  CTA- subsegmental right upper lobe PE. questionable right middle lobe subsegmental PE.  Scattered pulmonary nodules 4mm    Heperain drip, 6500 units IV push x1, 2L NS bolus, 40mg pantoprazole IV push x1, 20mg pepcid x1 given in the ED 49 yo M PMH Afib (on amiodarone), BPH, DM2, hx of NSTEMI, osteomyelitis of left foot s/p toe amputation presenting to the ED for chest pressure that started this afternoon. Of note, patient was discharged from [facility] this afternoon, and shortly after returning home, while at rest, patient felt 8/10 chest pressure that was non radiating. There was a short episode of SOB and dizziness but it resolved shortly afterwards but the chest pressure continued. Patient denies headaches, chest pain, abdominal pain, n/v/d or LE pain.     In the ED:  vitals WNL  labs significant for Cr 1.5, d-dimer 660, rBNP 614  EKG- NSR rate 60  CTA- subsegmental right upper lobe PE. questionable right middle lobe subsegmental PE.  Scattered pulmonary nodules 4mm    Heperain drip, 6500 units IV push x1, 2L NS bolus, 40mg pantoprazole IV push x1, 20mg pepcid x1 given in the ED

## 2019-06-06 NOTE — CONSULT NOTE ADULT - ASSESSMENT
48 year old male with PMH of afib, NSTEMI, osteomyelitis,  HTN and DM2, who presented to ED with chest pressure, admitted for management of PE. Patient was recently was admitted to [place] for perforated Antral Gastric Ulcer, s/p Emergent Exp-lap Oomentopexy and plication 5/11. Found to have new onset afib with elevated troponin post-operatively indicative of NSTEMI, started on Amiodarone and converted to sinus rhythm, left foot osteo s/p debridement OR 5/28    Cardio consulted for chest pain after admission     - patient was evaluated at bedside, endorsed midsternal chest pressure, reported feeling similar pressure when presented to ED with PE. Patient had recent NSTEMI and new onset afib at last admission. Stat EKG, vitals performed at bedside revealed sinus johnny at 54 bpm (on tele monitor patient's HR showed sinus between 50s-60s since admission).   - EKG unchanged from admission, no acute ST changes  - patient appeared in NAD in bed, denies any sob or rodas  - b/l pitting LE edema possible L>R, likely due to components of fluid overload with underlying osteomyelitis of left foot.   - will follow up on stat CE x3, repeat EKG on next CE  - patient still complaining of midsternal pressure discomfort after receiving 1x 325 asa and sublingual nitro. To get morphine for pain control per primary team order - will monitor BP closely, repeat vitals after administration.   - Previous echo in 5/2019 showed normal LV size and systolic fxn. estimated LVEF 65%.  - BP well controlled, not on any BP medications  - monitor vitals closely, can continue amiodarone, atorvastatin  - Monitor and replete lytes, keep K>4, Mg>2.  - Other cardiovascular workup will depend on clinical course. All other workup per primary team.  - Will follow. 48 year old male with PMH of afib, NSTEMI, osteomyelitis,  HTN and DM2, who presented to ED with chest pressure, admitted for management of PE. Patient was recently was admitted to [place] for perforated Antral Gastric Ulcer, s/p Emergent Exp-lap Oomentopexy and plication 5/11. Found to have new onset afib with elevated troponin post-operatively indicative of NSTEMI, started on Amiodarone and converted to sinus rhythm, left foot osteo s/p debridement OR 5/28    Cardio consulted for chest pain after admission     - patient was evaluated at bedside, endorsed midsternal chest pressure, reported feeling similar pressure when presented to ED with PE. Patient had recent NSTEMI and new onset afib at last admission. Stat EKG, vitals performed at bedside revealed sinus johnny at 54 bpm (on tele monitor patient's HR showed sinus between 50s-60s since admission).   - EKG unchanged from admission, no acute ST changes  - patient appeared in NAD in bed, denies any sob or rodas  - b/l pitting LE edema L>R, likely due to components of fluid overload with underlying osteomyelitis of left foot.   - will follow up on stat CE x3, repeat EKG on next CE  - patient still complaining of midsternal pressure discomfort after receiving 1x 325 asa and sublingual nitro. To get morphine for pain control per primary team order - will monitor BP closely, repeat vitals after administration.   - Previous echo in 5/2019 showed normal LV size and systolic fxn. estimated LVEF 65%.  - BP well controlled, not on any BP medications  - monitor vitals closely, can continue amiodarone, atorvastatin  - Monitor and replete lytes, keep K>4, Mg>2.  - Other cardiovascular workup will depend on clinical course. All other workup per primary team.  - Will follow.

## 2019-06-06 NOTE — DISCHARGE NOTE PROVIDER - CARE PROVIDERS DIRECT ADDRESSES
,DirectAddress_Unknown,DirectAddress_Unknown,[email or web address] ,DirectAddress_Unknown,[email or web address],DirectAddress_Unknown,DirectAddress_Unknown ,DirectAddress_Unknown,[email or web address],DirectAddress_Unknown,DirectAddress_Unknown,DirectAddress_Unknown,DirectAddress_Unknown

## 2019-06-06 NOTE — PROGRESS NOTE ADULT - PROBLEM SELECTOR PLAN 5
not on current home medications for HTN  was directed to f/u with outpatient cardiology upon discharge  monitor BP, treat accordingly  DASH/TLC diet

## 2019-06-06 NOTE — H&P ADULT - ASSESSMENT
47 yo M PMH Afib (on amiodarone), BPH, DM2, hx of NSTEMI, osteomyelitis of left foos s/p toe amputation presenting to the ED for chest pressure that started this afternoon admitted for PE

## 2019-06-06 NOTE — H&P ADULT - PROBLEM SELECTOR PLAN 6
hold home medication  low dose insulin coverage scale  hypoglycemic protocol  consistent carb diet hold home medication  low dose insulin coverage scale  10units Lantus at bedtime  3units Humalog before meals  hypoglycemic protocol  consistent carb diet continue tamsulosin daily

## 2019-06-06 NOTE — H&P ADULT - NSHPPHYSICALEXAM_GEN_ALL_CORE
PHYSICAL EXAM:  GENERAL: No acute distress  HEAD:  Atraumatic, Normocephalic  EYES: EOMI, PERRLA, conjunctiva and sclera clear  NECK: Supple, no lymphadenopathy, no JVD  CHEST/LUNG: CTAB; No wheezes, rales, or rhonchi  HEART: Regular rate and rhythm; No murmurs, rubs, or gallops  ABDOMEN: Soft, non-tender, non-distended; normal bowel sounds  EXTREMITIES:  2+ peripheral pulses b/l, +1 pitting edema R>L, warm on palpation, non painful, left toe amputation  NEUROLOGY: A&O x 3, no focal deficits  SKIN: warm and well perfused    Vital Signs Last 24 Hrs  T(C): 37.6 (06 Jun 2019 01:48), Max: 37.7 (05 Jun 2019 17:35)  T(F): 99.6 (06 Jun 2019 01:48), Max: 99.8 (05 Jun 2019 17:35)  HR: 60 (06 Jun 2019 01:48) (50 - 90)  BP: 148/72 (06 Jun 2019 01:48) (102/66 - 148/72)  BP(mean): 94 (06 Jun 2019 01:48) (94 - 94)  RR: 16 (06 Jun 2019 01:48) (16 - 18)  SpO2: 96% (06 Jun 2019 01:48) (94% - 97%)

## 2019-06-06 NOTE — PROGRESS NOTE ADULT - PROBLEM SELECTOR PLAN 1
CTA- subsegmental right upper lobe PE, questionable right middle lobe subsegmental PE.  d-dimer 660  hemodynamically stable  monitor BP Q4hrs  s/p 6500units IV push x1 in the ED  continue 25,000units heparin infusion at 15cc/hr  f/u LE dopplers as patient has LE swelling R>L CTA- subsegmental right upper lobe PE, questionable right middle lobe subsegmental PE.  - Pt CP continued, substernal , s/p nitro x3, ASA, morphine without relief  - Cardio consulted STAT, concern for ACS  vs GI upset  - CE STAT, EKG no change from previous   - Serial CE and EKGs   - s/p Heparin 6500units IV push x1 in the ED  - continue 25,000units heparin infusion at 15cc/hr  - f/u LE dopplers as patient has LE swelling R>L

## 2019-06-06 NOTE — CHART NOTE - NSCHARTNOTEFT_GEN_A_CORE
Called by RN for bradycardia. Patient seen and examined at bedside. Noted to have HR in low 50's on tele monitor. Patient is asymtptomatic. Per chart review, HR has been in 50's since this AM and patient did receive morphine and nitroglyerine for chest pain earlier today. Reports chest pain is still present but is tolerable. Patient denies any other complaints including headaches, dizziness, nausea, vomiting, left arm pain, numbness/tingling.          T(C): 36.8 (19 @ 20:27), Max: 38.2 (19 @ 03:39)  HR: 51 (19 @ 20:27) (51 - 78)  BP: 119/71 (19 @ 20:27) (106/65 - 160/75)  RR: 18 (19 @ 20:27) (16 - 18)  SpO2: 98% (19 @ 20:27) (92% - 98%)  Wt(kg): --    Physical :  Gen- NAD, Calm, pleasant  Cardio - s+1,s+2, rrr, no murmur  Lung - cta b/l, no wheeze, no rhonchi, no rales   Abdomen- +BS, NT/ND, no guarding, no rebound, no masses      LABS:                        8.4    6.71  )-----------( 306      ( 2019 07:54 )             26.7     06-06    144  |  111<H>  |  15  ----------------------------<  193<H>  3.7   |  26  |  1.30    Ca    7.9<L>      2019 05:05    TPro  6.7  /  Alb  2.9<L>  /  TBili  0.2  /  DBili  x   /  AST  18  /  ALT  17  /  AlkPhos  118  06-05    PT/INR - ( 2019 01:27 )   PT: 12.8 sec;   INR: 1.13 ratio         PTT - ( 2019 14:33 )  PTT:79.0 sec  Urinalysis Basic - ( 2019 11:16 )    Color: Yellow / Appearance: Clear / S.005 / pH: x  Gluc: x / Ketone: Negative  / Bili: Negative / Urobili: Negative   Blood: x / Protein: Negative / Nitrite: Negative   Leuk Esterase: Trace / RBC: Negative /HPF / WBC 3-5   Sq Epi: x / Non Sq Epi: Negative / Bacteria: Occasional        CARDIAC MARKERS ( 2019 17:10 )  .021 ng/mL / x     / 81 U/L / x     / 4.1 ng/mL  CARDIAC MARKERS ( 2019 11:03 )  .022 ng/mL / x     / 91 U/L / x     / 4.3 ng/mL  CARDIAC MARKERS ( 2019 23:10 )  .019 ng/mL / x     / x     / x     / x      CARDIAC MARKERS ( 2019 18:00 )  .017 ng/mL / x     / x     / x     / x            Assessment/Plan  48 year old male with PMH of  HTN and DM2, who presented with Perforated Antral Gastric Ulcer, s/p Emergent Exp-lap Oomentopexy and plication . Found to have new onset afib with elevated troponin post-operatively indicative of NSTEMI, started on Amiodarone and converted to sinus rhythm, left foot osteo s/p debridement OR . post op day 7. Admitted with RUL PE on CTA. Now pesenting with asymptomatic bradycardia    Patient is currently sinus johnny and is asymptomatic  Continue tele monitoring  Cardio following [facility]  Continue to monitor vitals  Will place pacer pads in room if bradycardia worsens  RN to call with any changes

## 2019-06-06 NOTE — PROGRESS NOTE ADULT - PROBLEM SELECTOR PLAN 2
patient was recently discharged from hospital for perforated ulcer  stable  continue 40mg pantoprazole BID  monitor hgb   risk/benefit of starting heparin drip with recent perforation of ulcer, treat PE monitor for signs of bleeding  check FOBT patient was recently discharged from hospital for perforated ulcer  stable  continue 40mg pantoprazole BID  monitor hgb   risk/benefit of starting heparin drip with recent perforation of ulcer, treat PE monitor for signs of bleeding  - check FOBT

## 2019-06-06 NOTE — H&P ADULT - PROBLEM SELECTOR PLAN 5
continue tamsulosin daily not on current home medications for HTN  to be following with outpatient cardiology  DASH/TLC diet not on current home medications for HTN  was directed to f/u with outpatient cardiology upon discharge  monitor BP, treat accordingly  DASH/TLC diet

## 2019-06-06 NOTE — DISCHARGE NOTE PROVIDER - HOSPITAL COURSE
47 yo M PMH Afib (on amiodarone), BPH, DM2, hx of NSTEMI, osteomyelitis of left foot s/p toe amputation presenting to the ED for chest pressure that started this afternoon. Of note, patient was discharged from [facility] this afternoon, and shortly after returning home, while at rest, patient felt 8/10 chest pressure that was non radiating. There was a short episode of SOB and dizziness but it resolved shortly afterwards but the chest pressure continued. Patient denied headaches, chest pain, abdominal pain, n/v/d or LE pain.         In the ED:    vitals WNL    labs significant for Cr 1.5, d-dimer 660, rBNP 614    EKG- NSR rate 60    CTA- subsegmental right upper lobe PE. questionable right middle lobe subsegmental PE.  Scattered pulmonary nodules 4mm        Heparin drip, 6500 units IV push x1, 2L NS bolus, 40mg pantoprazole IV push x1, 20mg pepcid x1 given in the ED         Hospital Course: Patient admitted with PE. Started on Heparin Drip.         B/L LE Venous Dopplers __________    Repeat CT chest in 6 months.         Consultants:    Dr. Lorna Perrin 47 yo M PMH Afib (on amiodarone), BPH, DM2, hx of NSTEMI, osteomyelitis of left foot s/p toe amputation presenting to the ED for chest pressure that started this afternoon. Of note, patient was discharged from [facility] this afternoon, and shortly after returning home, while at rest, patient felt 8/10 chest pressure that was non radiating. There was a short episode of SOB and dizziness but it resolved shortly afterwards but the chest pressure continued. Patient denied headaches, chest pain, abdominal pain, n/v/d or LE pain.         In the ED:    vitals WNL    labs significant for Cr 1.5, d-dimer 660, rBNP 614    EKG- NSR rate 60    CTA- subsegmental right upper lobe PE. questionable right middle lobe subsegmental PE.  Scattered pulmonary nodules 4mm        Heparin drip, 6500 units IV push x1, 2L NS bolus, 40mg pantoprazole IV push x1, 20mg pepcid x1 given in the ED         Hospital Course: Patient admitted with PE. Started on Heparin Drip. Drip changed over to po ______.        B/L LE Venous Dopplers __________    Repeat CT chest in 6 months.     Patient is stable for discharge home.         Consultants:    Dr. Lorna Perrin 47 yo M PMH Afib (on amiodarone), BPH, DM2, hx of NSTEMI, osteomyelitis of left foot s/p toe amputation presenting to the ED for chest pressure that started this afternoon. Of note, patient was discharged from [facility] this afternoon, and shortly after returning home, while at rest, patient felt 8/10 chest pressure that was non radiating. There was a short episode of SOB and dizziness but it resolved shortly afterwards but the chest pressure continued. Patient denied headaches, chest pain, abdominal pain, n/v/d or LE pain.         In the ED:    vitals WNL    labs significant for Cr 1.5, d-dimer 660, rBNP 614    EKG- NSR rate 60    CTA- subsegmental right upper lobe PE. questionable right middle lobe subsegmental PE.  Scattered pulmonary nodules 4mm        Heparin drip, 6500 units IV push x1, 2L NS bolus, 40mg pantoprazole IV push x1, 20mg pepcid x1 given in the ED         Hospital Course: Patient admitted with PE. Started on Heparin Drip. Patient complained of similar chest pain as admission. Dr. Gallegos cardiology consulted. EKG no changes. Received ASA, nitrostat, and morphine. Cardiac enzymes neg x 3. Drip changed over to po ______.        B/L LE Venous Dopplers negative.     Repeat CT chest in 6 months.     Patient is stable for discharge home.         Consultants:    Dr. Lorna Perrin 47 yo M PMH Afib (on amiodarone), BPH, DM2, hx of NSTEMI, osteomyelitis of left foot s/p toe amputation presenting to the ED for chest pressure that started this afternoon. Of note, patient was discharged from [facility] this afternoon, and shortly after returning home, while at rest, patient felt 8/10 chest pressure that was non radiating. There was a short episode of SOB and dizziness but it resolved shortly afterwards but the chest pressure continued. Patient denied headaches, chest pain, abdominal pain, n/v/d or LE pain.         In the ED: vitals WNL    labs significant for Cr 1.5, d-dimer 660, rBNP 614    EKG- NSR rate 60    CTA- subsegmental right upper lobe PE. questionable right middle lobe subsegmental PE.  Scattered pulmonary nodules 4mm. Heparin drip, 6500 units IV push x1, 2L NS bolus, 40mg pantoprazole IV push x1, 20mg pepcid x1 given in the ED         Hospital Course: Patient admitted with PE. Started on Heparin Drip. Patient complained of similar chest pain as admission. B/L LE Venous Dopplers negative. Dr. Gallegos cardiology consulted. EKG no changes. Received ASA, nitrostat, and morphine. Cardiac enzymes neg x 3. Dr. C Perlman endo consulted for Type 2 DM. Dr. Dmitri MAYES consulted for history of GI bleed. Started on PPI and carafate. No overt bleeding. Cardiology decreased amiodarone to 100mg daily due to asymptomatic bradycardia. Started on po lasix 40mg daily. Dr. Frazier wound care consulted for dressing change of left foot.             Repeat CT chest in 6 months.     Patient is stable for discharge home.         Consultants:    Dr. Shalshin Pulm Dr. Perlman Endo Dr. Sideridis GI Dr. [place] wound care 49 yo M PMH Afib (on amiodarone), BPH, DM2, hx of NSTEMI, osteomyelitis of left foot s/p toe amputation presenting to the ED for chest pressure that started this afternoon. Of note, patient was discharged from [facility] this afternoon, and shortly after returning home, while at rest, patient felt 8/10 chest pressure that was non radiating. There was a short episode of SOB and dizziness but it resolved shortly afterwards but the chest pressure continued. Patient denied headaches, chest pain, abdominal pain, n/v/d or LE pain.         In the ED: vitals WNL    labs significant for Cr 1.5, d-dimer 660, rBNP 614    EKG- NSR rate 60    CTA- subsegmental right upper lobe PE. questionable right middle lobe subsegmental PE.  Scattered pulmonary nodules 4mm. Heparin drip, 6500 units IV push x1, 2L NS bolus, 40mg pantoprazole IV push x1, 20mg pepcid x1 given in the ED         Hospital Course: Patient admitted with PE. Started on Heparin Drip. Patient complained of similar chest pain as admission. B/L LE Venous Dopplers negative. Dr. Gallegos cardiology consulted. EKG no changes. Received ASA, nitrostat, and morphine.  Cardiac enzymes neg x 3. Dr. C Perlman endo consulted for Type 2 DM. Dr. Dmitri MAYES consulted for history of GI bleed. Started on PPI and carafate for gastric ulcer from previous admission. No overt bleeding noted. Cardiology decreased amiodarone to 100mg daily due to asymptomatic bradycardia. Given IV lasix and then Started on po lasix 40mg daily. Dr. Frazier wound care consulted for dressing change of left foot.         Repeat CT chest in 6 months.     Patient is stable for discharge home.         Consultants:    Dr. Shalshin Pulm Dr. Perlman Endo Dr. Sideridis GI Dr. [place] wound care 49 yo M PMH Afib (on amiodarone), BPH, DM2, hx of NSTEMI, osteomyelitis of left foot s/p toe amputation presenting to the ED for chest pressure that started this afternoon. Of note, patient was discharged from [facility] this afternoon, and shortly after returning home, while at rest, patient felt 8/10 chest pressure that was non radiating. There was a short episode of SOB and dizziness but it resolved shortly afterwards but the chest pressure continued. Patient denied headaches, chest pain, abdominal pain, n/v/d or LE pain.         In the ED: vitals WNL    labs significant for Cr 1.5, d-dimer 660, rBNP 614    EKG- NSR rate 60    CTA- subsegmental right upper lobe PE. questionable right middle lobe subsegmental PE.  Scattered pulmonary nodules 4mm. Heparin drip, 6500 units IV push x1, 2L NS bolus, 40mg pantoprazole IV push x1, 20mg pepcid x1 given in the ED         Hospital Course: Patient admitted with PE. Started on Heparin Drip. Patient complained of similar chest pain as admission. B/L LE Venous Dopplers negative. Dr. Gallegos cardiology consulted. EKG no changes. Received ASA, nitrostat, and morphine.  Cardiac enzymes neg x 3. Dr. C Perlman endo consulted for Type 2 DM. Dr. Rizvi GI consulted for history of GI bleed. Started on PPI and carafate for gastric ulcer from previous admission. No overt bleeding noted. Cardiology decreased amiodarone to 100mg daily due to asymptomatic bradycardia. Given IV lasix and then Started on po lasix 40mg daily. Dr. Frazier wound care consulted for dressing change of left foot.         Repeat CT chest in 6 months.     Patient is stable for discharge home.         Consultants:    Dr. Kraus Cardiology    Dr. Shalshin Pulm Dr. Perlman Endo Dr. Sideridis GI Dr. [place] Podiat[facility] 49 yo M PMH Paroxysmal Afib (on amiodarone), perforated gastric ulcer s/p plication (5/19), BPH, DM2, hx of NSTEMI, osteomyelitis of left foot s/p toe amputation presenting to the ED for chest pressure that started this afternoon. Of note, patient was discharged from [facility] this afternoon, and shortly after returning home, while at rest, patient felt 8/10 chest pressure that was non radiating. There was a short episode of SOB and dizziness but it resolved shortly afterwards but the chest pressure continued. Patient denied headaches, chest pain, abdominal pain, n/v/d or LE pain.         In the ED: vitals WNL    labs significant for Cr 1.5, d-dimer 660, rBNP 614    EKG- NSR rate 60    CTA- subsegmental right upper lobe PE. questionable right middle lobe subsegmental PE.  Scattered pulmonary nodules 4mm. Heparin drip, 6500 units IV push x1, 2L NS bolus, 40mg pantoprazole IV push x1, 20mg pepcid x1 given in the ED         Hospital Course: Patient admitted with PE. Started on Heparin Drip. Patient complained of similar chest pain as admission. B/L LE Venous Dopplers negative. Dr. Gallegos cardiology consulted. EKG no changes. Received ASA, nitrostat, and morphine.  Cardiac enzymes neg x 3. Dr. C Perlman endo consulted for Type 2 DM. Dr. Dmitri MAYES consulted for history of GI bleed. Started on PPI and carafate for gastric ulcer from previous admission. No overt bleeding noted. Cardiology decreased amiodarone to 100mg daily due to asymptomatic bradycardia. Given IV lasix and then Started on po lasix 40mg daily. Dr. Frazier wound care consulted for dressing change of left foot.         Repeat CT chest in 6 months to follow up pulmonary nodules     Patient is stable for discharge to rehab.         Consultants:    Dr. Kraus Cardiology    Dr. Shalshin Pulm Dr. Perlman Endo Dr. Sideridis GI Dr. [place] Podiat[facility]

## 2019-06-06 NOTE — PROVIDER CONTACT NOTE (OTHER) - ACTION/TREATMENT ORDERED:
tried paging resident twice put a call out to Dr Gallegos awaiting call back. tried paging resident twice put a call out to Dr Gallegos awaiting call back.  Dr. Hickey made aware will continue to monitor the pt

## 2019-06-06 NOTE — H&P ADULT - NSHPREVIEWOFSYSTEMS_GEN_ALL_CORE
REVIEW OF SYSTEMS:    CONSTITUTIONAL: No weakness, fevers or chills  EYES/ENT: + dizziness, no visual changes, no headaches;  No vertigo or throat pain   NECK: No pain or stiffness  RESPIRATORY: No cough, wheezing, hemoptysis; +shortness of breath  CARDIOVASCULAR: + chest pressure, no chest pain or palpitations  GASTROINTESTINAL: No abdominal or epigastric pain. No nausea, vomiting, or hematemesis; No diarrhea or constipation. No melena or hematochezia.  GENITOURINARY: No dysuria, frequency or hematuria  NEUROLOGICAL: No numbness or weakness  SKIN: No itching, rashes

## 2019-06-06 NOTE — H&P ADULT - NSICDXPASTMEDICALHX_GEN_ALL_CORE_FT
PAST MEDICAL HISTORY:  Afib     BPH (benign prostatic hyperplasia)     Diabetes mellitus with no complication     Hypertension

## 2019-06-06 NOTE — DISCHARGE NOTE PROVIDER - PROVIDER TOKENS
PROVIDER:[TOKEN:[[id number]]],FREE:[LAST:[Rico],FIRST:[Linette],PHONE:[(   )    -],FAX:[(   )    -],ADDRESS:[phone]]],PROVIDER:[TOKEN:[[id number]]] PROVIDER:[TOKEN:[[id number]],FOLLOWUP:[1 month]],PROVIDER:[TOKEN:[[id number]],FOLLOWUP:[1 week]],FREE:[LAST:[Rico],FIRST:[Linette],PHONE:[(   )    -],FAX:[(   )    -],ADDRESS:[phone]],FOLLOWUP:[1-3 days]],PROVIDER:[TOKEN:[[id number]],FOLLOWUP:[1 week]] PROVIDER:[TOKEN:[[id number]],FOLLOWUP:[1 month]],PROVIDER:[TOKEN:[[id number]],FOLLOWUP:[1 week]],PROVIDER:[TOKEN:[[id number]],FOLLOWUP:[1 week]],PROVIDER:[TOKEN:[[id number]],FOLLOWUP:[1 week]],FREE:[LAST:[Rico],FIRST:[Linette],PHONE:[(   )    -],FAX:[(   )    -],ADDRESS:[phone]],FOLLOWUP:[1-3 days]],PROVIDER:[TOKEN:[[id number]],FOLLOWUP:[1 week]]

## 2019-06-06 NOTE — H&P ADULT - PROBLEM SELECTOR PLAN 1
CTA- subsegmental right upper lobe PE, questionable right middle lobe subsegmental PE.  s/p 6500units IV push x1 in the ED  continue 25,000 heparin infusion CTA- subsegmental right upper lobe PE, questionable right middle lobe subsegmental PE.  s/p 6500units IV push x1 in the ED  continue 25,000 heparin infusion  f/u LE dopplers CTA- subsegmental right upper lobe PE, questionable right middle lobe subsegmental PE.  d-dimer 660  hemodynamically stable  s/p 6500units IV push x1 in the ED  continue 25,000units heparin infusion at 15cc/hr  f/u LE dopplers as patient has LE swelling R>L CTA- subsegmental right upper lobe PE, questionable right middle lobe subsegmental PE.  d-dimer 660  hemodynamically stable  monitor BP Q4hrs  s/p 6500units IV push x1 in the ED  continue 25,000units heparin infusion at 15cc/hr  f/u LE dopplers as patient has LE swelling R>L

## 2019-06-06 NOTE — DISCHARGE NOTE PROVIDER - NSDCCPCAREPLAN_GEN_ALL_CORE_FT
PRINCIPAL DISCHARGE DIAGNOSIS  Diagnosis: Pulmonary embolism  Assessment and Plan of Treatment:       SECONDARY DISCHARGE DIAGNOSES  Diagnosis: Pulmonary nodules  Assessment and Plan of Treatment: Pulmonary nodules - Stable: Repeat CT chest in 6 months    Diagnosis: Afib  Assessment and Plan of Treatment: Afib    Diagnosis: Perforated ulcer  Assessment and Plan of Treatment: Perforated ulcer    Diagnosis: Hypertension  Assessment and Plan of Treatment: Hypertension    Diagnosis: Diabetes mellitus  Assessment and Plan of Treatment: Diabetes mellitus    Diagnosis: Anemia  Assessment and Plan of Treatment: Anemia PRINCIPAL DISCHARGE DIAGNOSIS  Diagnosis: Pulmonary embolism  Assessment and Plan of Treatment:       SECONDARY DISCHARGE DIAGNOSES  Diagnosis: Pulmonary nodules  Assessment and Plan of Treatment: Stable: Repeat CT chest in 6 months  Follow up outpatient with pulmonary    Diagnosis: Urinary retention  Assessment and Plan of Treatment: Continue with flomax    Diagnosis: Diabetes mellitus  Assessment and Plan of Treatment: Continue with Levemir 10 units at bedtime, Humalog 3 units prior to eat meal, and Humalog sliding scale  Follow up outpatient with Dr. C Perlman    Diagnosis: Afib  Assessment and Plan of Treatment: Continue with amiodarone    Diagnosis: Perforated ulcer  Assessment and Plan of Treatment: Continue with pantoprazole    Diagnosis: Hypertension  Assessment and Plan of Treatment: Controlled - no home meds    Diagnosis: Anemia  Assessment and Plan of Treatment: Stable PRINCIPAL DISCHARGE DIAGNOSIS  Diagnosis: Pulmonary embolism  Assessment and Plan of Treatment: A pulmonary embolism (clot) was found in your right lung and treated with IV Heparin. Please continue to take as prescribed and follow up with your primary care doctor to continue this treatment over the next 6 months.      SECONDARY DISCHARGE DIAGNOSES  Diagnosis: Urinary retention  Assessment and Plan of Treatment: Continue with flomax    Diagnosis: Perforated ulcer  Assessment and Plan of Treatment: Continue with pantoprazole and sulcrafate to encourage healing of your stomach ulcer. Please follow up with your primary care provider within 1 week.    Diagnosis: Afib  Assessment and Plan of Treatment: Continue with amiodarone    Diagnosis: Hypertension  Assessment and Plan of Treatment: Controlled - no home meds    Diagnosis: Diabetes mellitus  Assessment and Plan of Treatment: Continue with Levemir 10 units at bedtime, Humalog 3 units prior to eat meal, and Humalog sliding scale  Follow up outpatient with Dr. C Perlman    Diagnosis: Pulmonary nodules  Assessment and Plan of Treatment: Stable: Repeat CT chest in 6 months  Follow up outpatient with pulmonary    Diagnosis: Anemia  Assessment and Plan of Treatment: Stable PRINCIPAL DISCHARGE DIAGNOSIS  Diagnosis: Pulmonary embolism  Assessment and Plan of Treatment: A pulmonary embolism (clot) was found in your right lung and treated with IV Heparin. Please continue to take as prescribed and follow up with your primary care doctor and your cardiologist Dr. Soto to continue this treatment over the next 6 months.      SECONDARY DISCHARGE DIAGNOSES  Diagnosis: Bilateral lower extremity edema  Assessment and Plan of Treatment: You have swelling of your ankles on both sides. Your echocardiogram (ultrasound of your heartin May 2019) showed normal function.  Please continue to take oral lasix (furosemide) daily as prescribed. Please follow up with your cardiologist within 1 week from discharge.    Diagnosis: Toe osteomyelitis  Assessment and Plan of Treatment: You had a left hallux resection with podiatry and you completed the treamment course of IV antibiotics for osteomyelitis in the hospital. Ambulate with surgical shoe and partial weight bearing. Follow up with Dr. Hale/Freddie within 1 week from discharge    Diagnosis: Urinary retention  Assessment and Plan of Treatment: Continue with flomax for your BPH    Diagnosis: Perforated ulcer  Assessment and Plan of Treatment: Continue with pantoprazole and sulcrafate to encourage healing of your stomach ulcer. Please follow up with your Gastroenterologist Dr. Rizvi within 1 week for care for management of  your gastric ulcer    Diagnosis: Afib  Assessment and Plan of Treatment: You have an irregular heart rhythm called Atrial fibrillation that was converted to normal sinus rhythm. Cardiology did not recommend long term anticoagulation. Please continue taking amiodarone 100 mg daily as prescribed. Cardiology will likely switch you to a BB as outpatient and recommend an event monitor as outpatient. Please follow up with your cardiologist as within 1 week from discharge.    Diagnosis: Hypertension  Assessment and Plan of Treatment: Controlled - no home meds    Diagnosis: Diabetes mellitus  Assessment and Plan of Treatment:   Your A1c was 8. Please continue to take your insulin medications as prescribed. Please follow up with endocrinology as outpatient for further management. Please follow up with Dr. Craig Perlman for diabetes managment upon discharge.  Levemir 10 units at bedtime, Humalog 3 units prior to eat meal, and Humalog sliding scale    Diagnosis: Pulmonary nodules  Assessment and Plan of Treatment: Stable: Repeat CT chest in 6 months  Follow up outpatient with pulmonary Dr. Rothman and your primary care provider.    Diagnosis: Anemia  Assessment and Plan of Treatment: Stable

## 2019-06-06 NOTE — H&P ADULT - PROBLEM SELECTOR PLAN 4
not on current home medications for HTN  to be following with outpatient cardiology not on current home medications for HTN  to be following with outpatient cardiology  DASH/TLC diet encourage PO hydration

## 2019-06-06 NOTE — H&P ADULT - PROBLEM SELECTOR PLAN 2
patient was recently discharged from hospital for perforated ulcer  continue 40mg pantoprazole BID patient was recently discharged from hospital for perforated ulcer  stable  continue 40mg pantoprazole BID  monitor hgb   risk/benefit of starting heparin drip with recent perforation of ulcer, treatment of PE and monitor for signs of bleeding patient was recently discharged from hospital for perforated ulcer  stable  continue 40mg pantoprazole BID  monitor hgb   risk/benefit of starting heparin drip with recent perforation of ulcer, treat PE monitor for signs of bleeding  check FOBT

## 2019-06-06 NOTE — PROVIDER CONTACT NOTE (OTHER) - ACTION/TREATMENT ORDERED:
Pt is on heparin drip 15ml/hr. Dr Perlman came and assessed pt EKG 12 lead ordered and performed and read. ASA 325mg given Nirto sublingual ordered and given 1 tablet. Cardio examined pt labs drawn

## 2019-06-06 NOTE — PROGRESS NOTE ADULT - SUBJECTIVE AND OBJECTIVE BOX
CAPILLARY BLOOD GLUCOSE      POCT Blood Glucose.: 208 mg/dL (06 Jun 2019 07:44)  POCT Blood Glucose.: 106 mg/dL (05 Jun 2019 11:41)      Vital Signs Last 24 Hrs  T(C): 36.9 (06 Jun 2019 10:17), Max: 38.2 (06 Jun 2019 03:39)  T(F): 98.4 (06 Jun 2019 10:17), Max: 100.7 (06 Jun 2019 03:39)  HR: 57 (06 Jun 2019 10:17) (55 - 90)  BP: 112/65 (06 Jun 2019 10:17) (112/65 - 160/75)  BP(mean): 81 (06 Jun 2019 03:12) (81 - 94)  RR: 16 (06 Jun 2019 10:17) (16 - 18)  SpO2: 96% (06 Jun 2019 10:17) (94% - 97%)    General: WN/WD NAD  Respiratory: CTA B/L  CV: RRR, S1S2, no murmurs, rubs or gallops  Abdominal: Soft, NT, ND +BS, Last BM  Extremities: le foot dsg intact     06-06    144  |  111<H>  |  15  ----------------------------<  193<H>  3.7   |  26  |  1.30    Ca    7.9<L>      06 Jun 2019 05:05    TPro  6.7  /  Alb  2.9<L>  /  TBili  0.2  /  DBili  x   /  AST  18  /  ALT  17  /  AlkPhos  118  06-05      dextrose 40% Gel 15 Gram(s) Oral once PRN  dextrose 50% Injectable 12.5 Gram(s) IV Push once  dextrose 50% Injectable 25 Gram(s) IV Push once  dextrose 50% Injectable 25 Gram(s) IV Push once  glucagon  Injectable 1 milliGRAM(s) IntraMuscular once PRN  insulin glargine Injectable (LANTUS) 10 Unit(s) SubCutaneous at bedtime  insulin lispro (HumaLOG) corrective regimen sliding scale   SubCutaneous three times a day before meals  insulin lispro (HumaLOG) corrective regimen sliding scale   SubCutaneous at bedtime  insulin lispro Injectable (HumaLOG) 3 Unit(s) SubCutaneous three times a day before meals

## 2019-06-06 NOTE — DISCHARGE NOTE PROVIDER - CARE PROVIDER_API CALL
Perlman, Craig D ()  [address], [address]  [place] [address]  Phone: [phone]  Fax: [phone]  Follow Up Time:     Linette Rico  [phone]  Phone: (   )    -  Fax: (   )    -  Follow Up Time:     Juventino Soto)  Cardiovascular Disease  [address] [place] [address]  Phone: [phone]  Fax: [phone]  Follow Up Time: Perlman, Craig D (DO)  Medicine  [address] [place] [address], [address]  [place] [address]  Phone: [phone]  Fax: [phone]  Follow Up Time: 1 month    Juventino Soto (MD)  Cardiovascular Disease  [address] [place] [address]  Phone: [phone]  Fax: [phone]  Follow Up Time: 1 week    Linette Rico  [phone]  Phone: (   )    -  Fax: (   )    -  Follow Up Time: 1-3 days    Jimbo Hale (DPM)  Podiatric Medicine and Surgery  [address] [place] [address]  Phone: [phone]  Fax: [phone]  Follow Up Time: 1 week Perlman, Craig D (DO)  Medicine  [address] [facility], [address]  [place] [address]  Phone: [phone]  Fax: [phone]  Follow Up Time: 1 month    Juventino Soto)  Cardiovascular Disease  [address] [place] [address]  Phone: [phone]  Fax: [phone]  Follow Up Time: 1 week    Jimbo Hale (DPM)  Podiatric Medicine and Surgery  [address] [place] [address]  Phone: [phone]  Fax: [phone]  Follow Up Time: 1 week    Ruperto Mejia)  [facility]  [address] [facility], [address]  [place] [address]  Phone: [phone]  Fax: [phone]  Follow Up Time: 1 week    Linette Rico  [phone]  Phone: (   )    -  Fax: (   )    -  Follow Up Time: 1-3 days    Yogesh Rizvi (DO)  Internal Medicine  [address] [place] [address]  Phone: [phone]  Fax: [phone]  Follow Up Time: 1 week

## 2019-06-06 NOTE — H&P ADULT - NSHPSOCIALHISTORY_GEN_ALL_CORE
patient denies illicit drug use, denies alcohol use, quite cigarette use on may 10th 2019, smoked 1 pack cigarettes for 30 years prior.

## 2019-06-06 NOTE — PROGRESS NOTE ADULT - ASSESSMENT
48 year old male with PMH of  HTN and DM2, who presented with Perforated Antral Gastric Ulcer, s/p Emergent Exp-lap Oomentopexy and plication 5/11. Found to have new onset afib with elevated troponin post-operatively indicative of NSTEMI, started on Amiodarone and converted to sinus rhythm, left foot osteo s/p debridement OR 5/28. post op day 7. Admitted with RUL PE on CTA.

## 2019-06-06 NOTE — PROVIDER CONTACT NOTE (OTHER) - SITUATION
Pt HR was 48-49 for a little bit pt denied SOB abd pain and chest pain Pt HR was 48-49 for a little bit pt denied SOB abd pain and chest pain. Pt was in the bed when this happened pt is now in the chair HR is low 50's

## 2019-06-06 NOTE — H&P ADULT - PROBLEM SELECTOR PLAN 7
scattered 4mm pulm nodules seen on CTA  follow up outpatient hold home medication  low dose insulin coverage scale  10units Lantus at bedtime  3units Humalog before meals  hypoglycemic protocol  consistent carb diet

## 2019-06-06 NOTE — DISCHARGE NOTE PROVIDER - NSDCACTIVITY_GEN_ALL_CORE
Showering allowed/Walking - Outdoors allowed/No heavy lifting/straining/Stairs allowed/Walking - Indoors allowed

## 2019-06-07 DIAGNOSIS — R00.1 BRADYCARDIA, UNSPECIFIED: ICD-10-CM

## 2019-06-07 DIAGNOSIS — I48.0 PAROXYSMAL ATRIAL FIBRILLATION: ICD-10-CM

## 2019-06-07 LAB
ANION GAP SERPL CALC-SCNC: 6 MMOL/L — SIGNIFICANT CHANGE UP (ref 5–17)
APTT BLD: 73.8 SEC — HIGH (ref 27.5–36.3)
BUN SERPL-MCNC: 15 MG/DL — SIGNIFICANT CHANGE UP (ref 7–23)
CALCIUM SERPL-MCNC: 8.3 MG/DL — LOW (ref 8.5–10.1)
CHLORIDE SERPL-SCNC: 111 MMOL/L — HIGH (ref 96–108)
CK MB BLD-MCNC: 4.7 % — HIGH (ref 0–3.5)
CK MB BLD-MCNC: 4.7 % — HIGH (ref 0–3.5)
CK MB CFR SERPL CALC: 3 NG/ML — SIGNIFICANT CHANGE UP (ref 0–3.6)
CK MB CFR SERPL CALC: 3.5 NG/ML — SIGNIFICANT CHANGE UP (ref 0–3.6)
CK SERPL-CCNC: 64 U/L — SIGNIFICANT CHANGE UP (ref 26–308)
CK SERPL-CCNC: 75 U/L — SIGNIFICANT CHANGE UP (ref 26–308)
CO2 SERPL-SCNC: 27 MMOL/L — SIGNIFICANT CHANGE UP (ref 22–31)
CREAT SERPL-MCNC: 1.2 MG/DL — SIGNIFICANT CHANGE UP (ref 0.5–1.3)
CULTURE RESULTS: SIGNIFICANT CHANGE UP
GLUCOSE SERPL-MCNC: 143 MG/DL — HIGH (ref 70–99)
HCT VFR BLD CALC: 28.3 % — LOW (ref 39–50)
HGB BLD-MCNC: 8.9 G/DL — LOW (ref 13–17)
MAGNESIUM SERPL-MCNC: 2.1 MG/DL — SIGNIFICANT CHANGE UP (ref 1.6–2.6)
MCHC RBC-ENTMCNC: 30.8 PG — SIGNIFICANT CHANGE UP (ref 27–34)
MCHC RBC-ENTMCNC: 31.4 GM/DL — LOW (ref 32–36)
MCV RBC AUTO: 97.9 FL — SIGNIFICANT CHANGE UP (ref 80–100)
NRBC # BLD: 0 /100 WBCS — SIGNIFICANT CHANGE UP (ref 0–0)
PHOSPHATE SERPL-MCNC: 3.3 MG/DL — SIGNIFICANT CHANGE UP (ref 2.5–4.5)
PLATELET # BLD AUTO: 301 K/UL — SIGNIFICANT CHANGE UP (ref 150–400)
POTASSIUM SERPL-MCNC: 3.8 MMOL/L — SIGNIFICANT CHANGE UP (ref 3.5–5.3)
POTASSIUM SERPL-SCNC: 3.8 MMOL/L — SIGNIFICANT CHANGE UP (ref 3.5–5.3)
RBC # BLD: 2.89 M/UL — LOW (ref 4.2–5.8)
RBC # FLD: 14 % — SIGNIFICANT CHANGE UP (ref 10.3–14.5)
SODIUM SERPL-SCNC: 144 MMOL/L — SIGNIFICANT CHANGE UP (ref 135–145)
SPECIMEN SOURCE: SIGNIFICANT CHANGE UP
TROPONIN I SERPL-MCNC: 0.03 NG/ML — SIGNIFICANT CHANGE UP (ref 0.01–0.04)
WBC # BLD: 5.65 K/UL — SIGNIFICANT CHANGE UP (ref 3.8–10.5)
WBC # FLD AUTO: 5.65 K/UL — SIGNIFICANT CHANGE UP (ref 3.8–10.5)

## 2019-06-07 PROCEDURE — 93010 ELECTROCARDIOGRAM REPORT: CPT

## 2019-06-07 PROCEDURE — 99232 SBSQ HOSP IP/OBS MODERATE 35: CPT

## 2019-06-07 RX ORDER — ASPIRIN/CALCIUM CARB/MAGNESIUM 324 MG
81 TABLET ORAL DAILY
Refills: 0 | Status: DISCONTINUED | OUTPATIENT
Start: 2019-06-07 | End: 2019-06-13

## 2019-06-07 RX ORDER — SUCRALFATE 1 G
1 TABLET ORAL
Refills: 0 | Status: DISCONTINUED | OUTPATIENT
Start: 2019-06-07 | End: 2019-06-13

## 2019-06-07 RX ADMIN — PANTOPRAZOLE SODIUM 40 MILLIGRAM(S): 20 TABLET, DELAYED RELEASE ORAL at 05:54

## 2019-06-07 RX ADMIN — HEPARIN SODIUM 1500 UNIT(S)/HR: 5000 INJECTION INTRAVENOUS; SUBCUTANEOUS at 05:50

## 2019-06-07 RX ADMIN — SENNA PLUS 2 TABLET(S): 8.6 TABLET ORAL at 21:57

## 2019-06-07 RX ADMIN — INSULIN GLARGINE 10 UNIT(S): 100 INJECTION, SOLUTION SUBCUTANEOUS at 21:57

## 2019-06-07 RX ADMIN — Medication 1 GRAM(S): at 18:35

## 2019-06-07 RX ADMIN — PANTOPRAZOLE SODIUM 40 MILLIGRAM(S): 20 TABLET, DELAYED RELEASE ORAL at 17:27

## 2019-06-07 RX ADMIN — Medication 1 PATCH: at 11:10

## 2019-06-07 RX ADMIN — TAMSULOSIN HYDROCHLORIDE 0.4 MILLIGRAM(S): 0.4 CAPSULE ORAL at 21:57

## 2019-06-07 RX ADMIN — Medication 100 MILLIGRAM(S): at 14:10

## 2019-06-07 RX ADMIN — AMIODARONE HYDROCHLORIDE 200 MILLIGRAM(S): 400 TABLET ORAL at 06:47

## 2019-06-07 RX ADMIN — Medication 3 UNIT(S): at 08:23

## 2019-06-07 RX ADMIN — Medication 100 MILLIGRAM(S): at 05:54

## 2019-06-07 RX ADMIN — Medication 1 PATCH: at 11:30

## 2019-06-07 RX ADMIN — Medication 3 UNIT(S): at 17:27

## 2019-06-07 RX ADMIN — Medication 81 MILLIGRAM(S): at 11:30

## 2019-06-07 RX ADMIN — Medication 3 UNIT(S): at 12:40

## 2019-06-07 RX ADMIN — Medication 30 MILLILITER(S): at 11:26

## 2019-06-07 RX ADMIN — Medication 100 MILLIGRAM(S): at 21:57

## 2019-06-07 RX ADMIN — ATORVASTATIN CALCIUM 80 MILLIGRAM(S): 80 TABLET, FILM COATED ORAL at 21:56

## 2019-06-07 RX ADMIN — Medication 1: at 17:26

## 2019-06-07 NOTE — PROGRESS NOTE ADULT - SUBJECTIVE AND OBJECTIVE BOX
Patient is a 48y old  Male who presents with a chief complaint of PE (2019 07:47)      48 year old male with PMH of  HTN and DM2, who presented with Perforated Antral Gastric Ulcer, s/p Emergent Exp-lap Oomentopexy and plication , Proxismal afib,  with elevated troponin post-operatively indicative of NSTEMI, started on Amiodarone and converted to sinus rhythm,  left foot osteo s/p debridement , now presenting for CP and dyspnea x 1 day. Admitted for sub segmental PE RUL. on Heparin     Interval HPI/Overnight events:        T(C): 36.7 (19 @ 07:17), Max: 37.4 (19 @ 15:15)  HR: 48 (19 @ 07:17) (48 - 57)  BP: 129/79 (19 @ 07:17) (106/65 - 131/74)  RR: 18 (19 @ 07:17) (16 - 189)  SpO2: 93% (19 @ 07:17) (92% - 98%)  Wt(kg): --    I&O's Summary    2019 07:01  -  2019 07:00  --------------------------------------------------------  IN: 1020 mL / OUT: 500 mL / NET: 520 mL        LABS:                        8.9    5.65  )-----------( 301      ( 2019 05:05 )             28.3     06-07    144  |  111<H>  |  15  ----------------------------<  143<H>  3.8   |  27  |  1.20    Ca    8.3<L>      2019 05:05  Phos  3.3     06-07  Mg     2.1     06-07    TPro  6.7  /  Alb  2.9<L>  /  TBili  0.2  /  DBili  x   /  AST  18  /  ALT  17  /  AlkPhos  118  06-05    PT/INR - ( 2019 01:27 )   PT: 12.8 sec;   INR: 1.13 ratio         PTT - ( 2019 05:05 )  PTT:73.8 sec  CAPILLARY BLOOD GLUCOSE      POCT Blood Glucose.: 119 mg/dL (2019 07:59)  POCT Blood Glucose.: 180 mg/dL (2019 21:34)  POCT Blood Glucose.: 168 mg/dL (2019 16:40)  POCT Blood Glucose.: 130 mg/dL (2019 11:24)      Urinalysis Basic - ( 2019 11:16 )    Color: Yellow / Appearance: Clear / S.005 / pH: x  Gluc: x / Ketone: Negative  / Bili: Negative / Urobili: Negative   Blood: x / Protein: Negative / Nitrite: Negative   Leuk Esterase: Trace / RBC: Negative /HPF / WBC 3-5   Sq Epi: x / Non Sq Epi: Negative / Bacteria: Occasional           MEDICATIONS  (STANDING):  amiodarone    Tablet 200 milliGRAM(s) Oral daily  atorvastatin 80 milliGRAM(s) Oral at bedtime  dextrose 5%. 1000 milliLiter(s) (50 mL/Hr) IV Continuous <Continuous>  dextrose 50% Injectable 12.5 Gram(s) IV Push once  dextrose 50% Injectable 25 Gram(s) IV Push once  dextrose 50% Injectable 25 Gram(s) IV Push once  docusate sodium 100 milliGRAM(s) Oral three times a day  heparin  Infusion.  Unit(s)/Hr (15 mL/Hr) IV Continuous <Continuous>  insulin glargine Injectable (LANTUS) 10 Unit(s) SubCutaneous at bedtime  insulin lispro (HumaLOG) corrective regimen sliding scale   SubCutaneous three times a day before meals  insulin lispro (HumaLOG) corrective regimen sliding scale   SubCutaneous at bedtime  insulin lispro Injectable (HumaLOG) 3 Unit(s) SubCutaneous three times a day before meals  nicotine - 21 mG/24Hr(s) Patch 1 patch Transdermal daily  pantoprazole    Tablet 40 milliGRAM(s) Oral two times a day  polyethylene glycol 3350 17 Gram(s) Oral at bedtime  senna 2 Tablet(s) Oral at bedtime  tamsulosin 0.4 milliGRAM(s) Oral at bedtime    MEDICATIONS  (PRN):  ALBUTerol    90 MICROgram(s) HFA Inhaler 2 Puff(s) Inhalation every 6 hours PRN Shortness of Breath and/or Wheezing  dextrose 40% Gel 15 Gram(s) Oral once PRN Blood Glucose LESS THAN 70 milliGRAM(s)/deciliter  glucagon  Injectable 1 milliGRAM(s) IntraMuscular once PRN Glucose LESS THAN 70 milligrams/deciliter  heparin  Injectable 6500 Unit(s) IV Push every 6 hours PRN For aPTT less than 40  heparin  Injectable 3000 Unit(s) IV Push every 6 hours PRN For aPTT between 40 - 57      REVIEW OF SYSTEMS:  CONSTITUTIONAL: No fever, weight loss, or fatigue  EYES: No eye pain, visual disturbances, or discharge  ENMT: No difficulty hearing, tinnitus, vertigo; No sinus or throat pain  NECK: No pain or stiffness  RESPIRATORY: No cough, wheezing, chills or hemoptysis; No shortness of breath  CARDIOVASCULAR: No chest pain, palpitations, dizziness, or leg swelling  GASTROINTESTINAL: No abdominal or epigastric pain. No nausea, vomiting, or hematemesis; No diarrhea or constipation; No melena or hematochezia  GENITOURINARY: No dysuria, frequency, hematuria, or incontinence  NEUROLOGICAL: No headaches, memory loss, loss of strength, numbness, or tremors  SKIN: No itching, burning, rashes, or lesions   LYMPH NODES: No enlarged glands  ENDOCRINE: No heat or cold intolerance; No hair loss  MUSCULOSKELETAL: No joint pain or swelling; No muscle, back, or extremity pain  PSYCHIATRIC: No depression, anxiety, mood swings, or difficulty sleeping  HEME/LYMPH: No easy bruising, or bleeding gums      RADIOLOGY & ADDITIONAL TESTS:     Imaging Personally Reviewed:  [x ] YES  [ ] NO    Consultant(s) Notes Reviewed:  [x ] YES  [ ] NO    PHYSICAL EXAM:  GENERAL: NAD, well-developed  HEAD: Atraumatic, Normocephalic  EYES: EOMI, PERRLA, conjunctiva and sclera clear  ENMT: No tonsillar erythema, exudates, or enlargement; Moist mucous membranes  NECK: Supple, No JVD  NERVOUS SYSTEM: Alert & Oriented X3, Good concentration; Motor strength 5/5 B/L upper and lower extremities  CHEST/LUNG: Clear to auscultation bilaterally, No rales, rhonchi, wheezing, or rubs  HEART: Regular rate and rhythm; No murmurs, rubs, or gallops  ABDOMEN: Soft, Nontender, Nondistended; Bowel sounds present  EXTREMITIES: 2+ Peripheral Pulses, 1+ pitting edema of ankles b/l, bandaged left foot        Care Discussed with Consultants/Other Providers [ ] YES  [ ] NO Patient is a 48y old  Male who presents with a chief complaint of PE (2019 07:47)      48 year old male with PMH of  HTN and DM2, who presented with Perforated Antral Gastric Ulcer, s/p Emergent Exp-lap Oomentopexy and plication , Proxismal afib,  with elevated troponin post-operatively indicative of NSTEMI, started on Amiodarone and converted to sinus rhythm,  left foot osteo s/p debridement , now presenting for CP and dyspnea x 1 day. Admitted for sub segmental PE RUL. on Heparin     Interval HPI/Overnight events: 11 beats of NSVT overnight. Patient was asymptomatic Patient states his chest pain continued throughout the night,  2/10, pressure-like, non radiating, unchanged by position or activity  and unable to receive dose of morphine overnight given bradycardia in high 40's.  No other complaints        T(C): 36.7 (19 @ 07:17), Max: 37.4 (19 @ 15:15)  HR: 48 (19 @ 07:17) (48 - 57)  BP: 129/79 (19 @ 07:17) (106/65 - 131/74)  RR: 18 (19 @ 07:17) (16 - 189)  SpO2: 93% (19 @ 07:17) (92% - 98%)  Wt(kg): --    I&O's Summary    2019 07:01  -  2019 07:00  --------------------------------------------------------  IN: 1020 mL / OUT: 500 mL / NET: 520 mL        LABS:                        8.9    5.65  )-----------( 301      ( 2019 05:05 )             28.3     06-07    144  |  111<H>  |  15  ----------------------------<  143<H>  3.8   |  27  |  1.20    Ca    8.3<L>      2019 05:05  Phos  3.3     06-  Mg     2.1     -07    TPro  6.7  /  Alb  2.9<L>  /  TBili  0.2  /  DBili  x   /  AST  18  /  ALT  17  /  AlkPhos  118  06-05    PT/INR - ( 2019 01:27 )   PT: 12.8 sec;   INR: 1.13 ratio         PTT - ( 2019 05:05 )  PTT:73.8 sec  CAPILLARY BLOOD GLUCOSE      POCT Blood Glucose.: 119 mg/dL (2019 07:59)  POCT Blood Glucose.: 180 mg/dL (2019 21:34)  POCT Blood Glucose.: 168 mg/dL (2019 16:40)  POCT Blood Glucose.: 130 mg/dL (2019 11:24)      Urinalysis Basic - ( 2019 11:16 )    Color: Yellow / Appearance: Clear / S.005 / pH: x  Gluc: x / Ketone: Negative  / Bili: Negative / Urobili: Negative   Blood: x / Protein: Negative / Nitrite: Negative   Leuk Esterase: Trace / RBC: Negative /HPF / WBC 3-5   Sq Epi: x / Non Sq Epi: Negative / Bacteria: Occasional           MEDICATIONS  (STANDING):  amiodarone    Tablet 200 milliGRAM(s) Oral daily  atorvastatin 80 milliGRAM(s) Oral at bedtime  dextrose 5%. 1000 milliLiter(s) (50 mL/Hr) IV Continuous <Continuous>  dextrose 50% Injectable 12.5 Gram(s) IV Push once  dextrose 50% Injectable 25 Gram(s) IV Push once  dextrose 50% Injectable 25 Gram(s) IV Push once  docusate sodium 100 milliGRAM(s) Oral three times a day  heparin  Infusion.  Unit(s)/Hr (15 mL/Hr) IV Continuous <Continuous>  insulin glargine Injectable (LANTUS) 10 Unit(s) SubCutaneous at bedtime  insulin lispro (HumaLOG) corrective regimen sliding scale   SubCutaneous three times a day before meals  insulin lispro (HumaLOG) corrective regimen sliding scale   SubCutaneous at bedtime  insulin lispro Injectable (HumaLOG) 3 Unit(s) SubCutaneous three times a day before meals  nicotine - 21 mG/24Hr(s) Patch 1 patch Transdermal daily  pantoprazole    Tablet 40 milliGRAM(s) Oral two times a day  polyethylene glycol 3350 17 Gram(s) Oral at bedtime  senna 2 Tablet(s) Oral at bedtime  tamsulosin 0.4 milliGRAM(s) Oral at bedtime    MEDICATIONS  (PRN):  ALBUTerol    90 MICROgram(s) HFA Inhaler 2 Puff(s) Inhalation every 6 hours PRN Shortness of Breath and/or Wheezing  dextrose 40% Gel 15 Gram(s) Oral once PRN Blood Glucose LESS THAN 70 milliGRAM(s)/deciliter  glucagon  Injectable 1 milliGRAM(s) IntraMuscular once PRN Glucose LESS THAN 70 milligrams/deciliter  heparin  Injectable 6500 Unit(s) IV Push every 6 hours PRN For aPTT less than 40  heparin  Injectable 3000 Unit(s) IV Push every 6 hours PRN For aPTT between 40 - 57      REVIEW OF SYSTEMS:  CONSTITUTIONAL: No fever, weight loss, or fatigue  EYES: No eye pain, visual disturbances, or discharge  ENMT: No difficulty hearing, tinnitus, vertigo; No sinus or throat pain  NECK: No pain or stiffness  RESPIRATORY: No cough, wheezing, chills or hemoptysis; No shortness of breath  CARDIOVASCULAR: + chest pain, Denies palpitations, dizziness, or leg swelling  GASTROINTESTINAL: No abdominal or epigastric pain. No nausea, vomiting, or hematemesis; No diarrhea or constipation; No melena or hematochezia  GENITOURINARY: No dysuria, frequency, hematuria, or incontinence  NEUROLOGICAL: No headaches, memory loss, loss of strength, numbness, or tremors  SKIN: No itching, burning, rashes, or lesions   LYMPH NODES: No enlarged glands  ENDOCRINE: No heat or cold intolerance; No hair loss  MUSCULOSKELETAL: No joint pain or swelling; No muscle, back, or extremity pain  PSYCHIATRIC: No depression, anxiety, mood swings, or difficulty sleeping  HEME/LYMPH: No easy bruising, or bleeding gums      RADIOLOGY & ADDITIONAL TESTS:     Imaging Personally Reviewed:  [x ] YES  [ ] NO    Consultant(s) Notes Reviewed:  [x ] YES  [ ] NO    PHYSICAL EXAM:  GENERAL: NAD, well-developed  HEAD: Atraumatic, Normocephalic  EYES: EOMI, PERRLA, conjunctiva and sclera clear  ENMT: No tonsillar erythema, exudates, or enlargement; Moist mucous membranes  NECK: Supple, No JVD  NERVOUS SYSTEM: Alert & Oriented X3, Good concentration; Motor strength 5/5 B/L upper and lower extremities  CHEST/LUNG: Clear to auscultation bilaterally, No rales, rhonchi, wheezing, or rubs  HEART: Regular rate and rhythm; No murmurs, rubs, or gallops  ABDOMEN: Soft, Nontender, Nondistended; Bowel sounds present  EXTREMITIES: 2+ Peripheral Pulses, 1+ pitting edema of ankles b/l, bandaged left foot        Care Discussed with Consultants/Other Providers [x ] YES  [ ] NO Patient is a 48y old  Male who presents with a chief complaint of PE (2019 07:47)      48 year old male with PMH of  HTN and DM2, who presented with Perforated Antral Gastric Ulcer, s/p Emergent Exp-lap Oomentopexy and plication , Proxismal afib,  with elevated troponin post-operatively indicative of NSTEMI, started on Amiodarone and converted to sinus rhythm,  left foot osteo s/p debridement , now presenting for CP and dyspnea x 1 day. Admitted for sub segmental PE RUL. on Heparin     Interval HPI/Overnight events: 11 beats of NSVT overnight. Patient was asymptomatic Patient states his chest pain continued throughout the night,  /10, pressure-like, non radiating, unchanged by position or activity  and unable to receive dose of morphine overnight given bradycardia in high 40's. Chest pain reproduced upon palpation of the right upper area of the chest. No other complaints        T(C): 36.7 (19 @ 07:17), Max: 37.4 (19 @ 15:15)  HR: 48 (19 @ 07:17) (48 - 57)  BP: 129/79 (19 @ 07:17) (106/65 - 131/74)  RR: 18 (19 @ 07:17) (16 - 189)  SpO2: 93% (19 @ 07:17) (92% - 98%)  Wt(kg): --    I&O's Summary    2019 07:01  -  2019 07:00  --------------------------------------------------------  IN: 1020 mL / OUT: 500 mL / NET: 520 mL        LABS:                        8.9    5.65  )-----------( 301      ( 2019 05:05 )             28.3         144  |  111<H>  |  15  ----------------------------<  143<H>  3.8   |  27  |  1.20    Ca    8.3<L>      2019 05:05  Phos  3.3     06-07  Mg     2.1     06-07    TPro  6.7  /  Alb  2.9<L>  /  TBili  0.2  /  DBili  x   /  AST  18  /  ALT  17  /  AlkPhos  118  06-05    PT/INR - ( 2019 01:27 )   PT: 12.8 sec;   INR: 1.13 ratio         PTT - ( 2019 05:05 )  PTT:73.8 sec  CAPILLARY BLOOD GLUCOSE      POCT Blood Glucose.: 119 mg/dL (2019 07:59)  POCT Blood Glucose.: 180 mg/dL (2019 21:34)  POCT Blood Glucose.: 168 mg/dL (2019 16:40)  POCT Blood Glucose.: 130 mg/dL (2019 11:24)      Urinalysis Basic - ( 2019 11:16 )    Color: Yellow / Appearance: Clear / S.005 / pH: x  Gluc: x / Ketone: Negative  / Bili: Negative / Urobili: Negative   Blood: x / Protein: Negative / Nitrite: Negative   Leuk Esterase: Trace / RBC: Negative /HPF / WBC 3-5   Sq Epi: x / Non Sq Epi: Negative / Bacteria: Occasional           MEDICATIONS  (STANDING):  amiodarone    Tablet 200 milliGRAM(s) Oral daily  atorvastatin 80 milliGRAM(s) Oral at bedtime  dextrose 5%. 1000 milliLiter(s) (50 mL/Hr) IV Continuous <Continuous>  dextrose 50% Injectable 12.5 Gram(s) IV Push once  dextrose 50% Injectable 25 Gram(s) IV Push once  dextrose 50% Injectable 25 Gram(s) IV Push once  docusate sodium 100 milliGRAM(s) Oral three times a day  heparin  Infusion.  Unit(s)/Hr (15 mL/Hr) IV Continuous <Continuous>  insulin glargine Injectable (LANTUS) 10 Unit(s) SubCutaneous at bedtime  insulin lispro (HumaLOG) corrective regimen sliding scale   SubCutaneous three times a day before meals  insulin lispro (HumaLOG) corrective regimen sliding scale   SubCutaneous at bedtime  insulin lispro Injectable (HumaLOG) 3 Unit(s) SubCutaneous three times a day before meals  nicotine - 21 mG/24Hr(s) Patch 1 patch Transdermal daily  pantoprazole    Tablet 40 milliGRAM(s) Oral two times a day  polyethylene glycol 3350 17 Gram(s) Oral at bedtime  senna 2 Tablet(s) Oral at bedtime  tamsulosin 0.4 milliGRAM(s) Oral at bedtime    MEDICATIONS  (PRN):  ALBUTerol    90 MICROgram(s) HFA Inhaler 2 Puff(s) Inhalation every 6 hours PRN Shortness of Breath and/or Wheezing  dextrose 40% Gel 15 Gram(s) Oral once PRN Blood Glucose LESS THAN 70 milliGRAM(s)/deciliter  glucagon  Injectable 1 milliGRAM(s) IntraMuscular once PRN Glucose LESS THAN 70 milligrams/deciliter  heparin  Injectable 6500 Unit(s) IV Push every 6 hours PRN For aPTT less than 40  heparin  Injectable 3000 Unit(s) IV Push every 6 hours PRN For aPTT between 40 - 57      REVIEW OF SYSTEMS:  CONSTITUTIONAL: No fever, weight loss, or fatigue  EYES: No eye pain, visual disturbances, or discharge  ENMT: No difficulty hearing, tinnitus, vertigo; No sinus or throat pain  NECK: No pain or stiffness  RESPIRATORY: No cough, wheezing, chills or hemoptysis; No shortness of breath  CARDIOVASCULAR: + chest pain, Denies palpitations, dizziness, or leg swelling  GASTROINTESTINAL: No abdominal or epigastric pain. No nausea, vomiting, or hematemesis; No diarrhea or constipation; No melena or hematochezia  GENITOURINARY: No dysuria, frequency, hematuria, or incontinence  NEUROLOGICAL: No headaches, memory loss, loss of strength, numbness, or tremors  SKIN: No itching, burning, rashes, or lesions   LYMPH NODES: No enlarged glands  ENDOCRINE: No heat or cold intolerance; No hair loss  MUSCULOSKELETAL: No joint pain or swelling; No muscle, back, or extremity pain  PSYCHIATRIC: No depression, anxiety, mood swings, or difficulty sleeping  HEME/LYMPH: No easy bruising, or bleeding gums      RADIOLOGY & ADDITIONAL TESTS:     Imaging Personally Reviewed:  [x ] YES  [ ] NO    Consultant(s) Notes Reviewed:  [x ] YES  [ ] NO    PHYSICAL EXAM:  GENERAL: NAD, well-developed  HEAD: Atraumatic, Normocephalic  EYES: EOMI, PERRLA, conjunctiva and sclera clear  ENMT: No tonsillar erythema, exudates, or enlargement; Moist mucous membranes  NECK: Supple, No JVD  NERVOUS SYSTEM: Alert & Oriented X3, Good concentration; Motor strength 5/5 B/L upper and lower extremities  CHEST/LUNG: Clear to auscultation bilaterally, No rales, rhonchi, wheezing, or rubs.  Chest pain reproduced upon palpation of the upper right area of the chest.  HEART: Regular rate and rhythm; No murmurs, rubs, or gallops  ABDOMEN: Soft, Nontender, Nondistended; Bowel sounds present  EXTREMITIES: 2+ Peripheral Pulses, 1+ pitting edema of ankles b/l, bandaged left foot        Care Discussed with Consultants/Other Providers [x ] YES  [ ] NO

## 2019-06-07 NOTE — PROGRESS NOTE ADULT - PROBLEM SELECTOR PLAN 1
PE  AF  chest pain  pulm nodules  smoker  copd  emphysema  ct reviewed  LE dopplers neg  Cardio following, cont tele monitoring, AF rx regimen,   on heparin gtt- change to DOAC - if ok with GI (eval pending for PUD hx), am Hgb pending  ambulate  out of bed  check sat on room air on exertion  will follow  will need PFT as outpatient  smoking cess ed and counseling  will need CT chest in 6 months to eval nodules - size and change

## 2019-06-07 NOTE — PROGRESS NOTE ADULT - PROBLEM SELECTOR PLAN 4
DVt proph- heparin drip for current PE -  Afib post op for gastric plication for perforated ulcer, last admission requiring Amio load  - Afib short-lived that it was decided he did not need long term AC, with plans to do an outpatient event monitor, NSR throughout his stay prior to discharge  - Continue Amiodarone at 200 mg daily  -  11 beats of NSVT overnight. If continues will consider low dose BB, though may not be able to in setting of bradycardia 50's

## 2019-06-07 NOTE — CONSULT NOTE ADULT - ASSESSMENT
anemia  gi bleed    daily cbc   transfuse prn   can a/c if bleeding occurs will need urgent egd/colonoscopy    check iron studies   ppi twice a day  check stool occult blood  further recommendations pending above    monitor cbc, transfuse prn,  carafate 1 gram bid  EGD once optimized if bleeding persists

## 2019-06-07 NOTE — PROGRESS NOTE ADULT - PROBLEM SELECTOR PLAN 5
not on current home medications for HTN  was directed to f/u with outpatient cardiology upon discharge  monitor BP, treat accordingly  DASH/TLC diet continue tamsulosin daily

## 2019-06-07 NOTE — PROGRESS NOTE ADULT - PROBLEM SELECTOR PLAN 3
continue 200mg amiodarone daily - Persistent bradycardia in 50's ,short 2 min episodes of high 40's, likely due to increased vagal tone  - CP experiencing may be related to symptomatic anemia exacerbated by bradycardia, poor perfusion  - Consider transfusion

## 2019-06-07 NOTE — PROGRESS NOTE ADULT - SUBJECTIVE AND OBJECTIVE BOX
Date/Time Patient Seen:  		  Referring MD:   Data Reviewed	       Patient is a 48y old  Male who presents with a chief complaint of PE (06 Jun 2019 10:55)      Subjective/HPI     PAST MEDICAL & SURGICAL HISTORY:  BPH (benign prostatic hyperplasia)  Hypertension  Afib  Diabetes mellitus with no complication  No pertinent past medical history  H/O abdominal surgery  No significant past surgical history        Medication list         MEDICATIONS  (STANDING):  amiodarone    Tablet 200 milliGRAM(s) Oral daily  atorvastatin 80 milliGRAM(s) Oral at bedtime  dextrose 5%. 1000 milliLiter(s) (50 mL/Hr) IV Continuous <Continuous>  dextrose 50% Injectable 12.5 Gram(s) IV Push once  dextrose 50% Injectable 25 Gram(s) IV Push once  dextrose 50% Injectable 25 Gram(s) IV Push once  docusate sodium 100 milliGRAM(s) Oral three times a day  heparin  Infusion.  Unit(s)/Hr (15 mL/Hr) IV Continuous <Continuous>  insulin glargine Injectable (LANTUS) 10 Unit(s) SubCutaneous at bedtime  insulin lispro (HumaLOG) corrective regimen sliding scale   SubCutaneous three times a day before meals  insulin lispro (HumaLOG) corrective regimen sliding scale   SubCutaneous at bedtime  insulin lispro Injectable (HumaLOG) 3 Unit(s) SubCutaneous three times a day before meals  nicotine - 21 mG/24Hr(s) Patch 1 patch Transdermal daily  pantoprazole    Tablet 40 milliGRAM(s) Oral two times a day  polyethylene glycol 3350 17 Gram(s) Oral at bedtime  senna 2 Tablet(s) Oral at bedtime  tamsulosin 0.4 milliGRAM(s) Oral at bedtime    MEDICATIONS  (PRN):  ALBUTerol    90 MICROgram(s) HFA Inhaler 2 Puff(s) Inhalation every 6 hours PRN Shortness of Breath and/or Wheezing  dextrose 40% Gel 15 Gram(s) Oral once PRN Blood Glucose LESS THAN 70 milliGRAM(s)/deciliter  glucagon  Injectable 1 milliGRAM(s) IntraMuscular once PRN Glucose LESS THAN 70 milligrams/deciliter  heparin  Injectable 6500 Unit(s) IV Push every 6 hours PRN For aPTT less than 40  heparin  Injectable 3000 Unit(s) IV Push every 6 hours PRN For aPTT between 40 - 57         Vitals log        ICU Vital Signs Last 24 Hrs  T(C): 37.2 (07 Jun 2019 04:14), Max: 37.4 (06 Jun 2019 15:15)  T(F): 99 (07 Jun 2019 04:14), Max: 99.4 (06 Jun 2019 15:15)  HR: 48 (07 Jun 2019 05:59) (48 - 57)  BP: 117/72 (07 Jun 2019 04:14) (106/65 - 131/74)  BP(mean): --  ABP: --  ABP(mean): --  RR: 189 (07 Jun 2019 04:14) (16 - 189)  SpO2: 95% (07 Jun 2019 01:27) (92% - 98%)           Input and Output:  I&O's Detail    06 Jun 2019 07:01  -  07 Jun 2019 07:00  --------------------------------------------------------  IN:    heparin  Infusion.: 180 mL    Oral Fluid: 840 mL  Total IN: 1020 mL    OUT:    Voided: 500 mL  Total OUT: 500 mL    Total NET: 520 mL          Lab Data                        8.9    5.65  )-----------( 301      ( 07 Jun 2019 05:05 )             28.3     06-07    144  |  111<H>  |  15  ----------------------------<  143<H>  3.8   |  27  |  1.20    Ca    8.3<L>      07 Jun 2019 05:05  Phos  3.3     06-07  Mg     2.1     06-07    TPro  6.7  /  Alb  2.9<L>  /  TBili  0.2  /  DBili  x   /  AST  18  /  ALT  17  /  AlkPhos  118  06-05      CARDIAC MARKERS ( 06 Jun 2019 23:36 )  .022 ng/mL / x     / 75 U/L / x     / 3.5 ng/mL  CARDIAC MARKERS ( 06 Jun 2019 17:10 )  .021 ng/mL / x     / 81 U/L / x     / 4.1 ng/mL  CARDIAC MARKERS ( 06 Jun 2019 11:03 )  .022 ng/mL / x     / 91 U/L / x     / 4.3 ng/mL  CARDIAC MARKERS ( 05 Jun 2019 23:10 )  .019 ng/mL / x     / x     / x     / x      CARDIAC MARKERS ( 05 Jun 2019 18:00 )  .017 ng/mL / x     / x     / x     / x            Review of Systems	      Objective     Physical Examination    heart s1s2  lung dec BS  abd soft  on room air      Pertinent Lab findings & Imaging      Ale:  NO   Adequate UO     I&O's Detail    06 Jun 2019 07:01  -  07 Jun 2019 07:00  --------------------------------------------------------  IN:    heparin  Infusion.: 180 mL    Oral Fluid: 840 mL  Total IN: 1020 mL    OUT:    Voided: 500 mL  Total OUT: 500 mL    Total NET: 520 mL               Discussed with:     Cultures:	        Radiology

## 2019-06-07 NOTE — PROGRESS NOTE ADULT - PROBLEM SELECTOR PLAN 1
cont lantus 10 units qhs  cont humalog 3 units 3x/day before meals  cont humalog scale coverage qac/qhs  cont cons cho diet  goal bg 100-180 in hosp setting

## 2019-06-07 NOTE — PROGRESS NOTE ADULT - PROBLEM SELECTOR PLAN 7
hold home medication  low dose insulin coverage scale  10units Lantus at bedtime  3units Humalog before meals  hypoglycemic protocol  consistent carb diet not on current home medications for HTN  was directed to f/u with outpatient cardiology upon discharge  monitor BP, treat accordingly  DASH/TLC diet

## 2019-06-07 NOTE — PROGRESS NOTE ADULT - ASSESSMENT
48 year old male with PMH of afib, NSTEMI, osteomyelitis,  HTN and DM2, who presented to ED with chest pressure, admitted for management of PE. Patient was recently was admitted to [place] for perforated Antral Gastric Ulcer, s/p Emergent Exp-lap Oomentopexy and plication 5/11. Found to have new onset afib with elevated troponin post-operatively indicative of NSTEMI, started on Amiodarone and converted to sinus rhythm, left foot osteo s/p debridement OR 5/28    Cardio consulted for chest pain after admission     PE (RUL subsegmental)  - Continue Heparin drip (full Ac nomogram).  Will eventually switch to NOAC.  Will defer to Pulm  - Venous USD negative.  - Monitor for s/s bleeding  - Continue to monitor on tele    Afib  - He went into Afib post op last admission requiring Amio load  - It was short-lived that it was decided he did not need long term AC, with plans to do an outpatient event monitor  - He remained in NSR throughout his stay prior to discharge  - He is on Amiodarone at 200 mg daily  - He had an episode of 11 beats of NSVT overnight.  If he continues to have this arrhythmia, may need to start low dose BB, though, we need to be cautious in setting of bradycardia  - Monitor electrolytes, replete to keep K>4 and Mag>2     NSTEMI vs demand ischemia  - He had elevated troponins post op from his recent admission, though all his CPK's were negative.  Plan was outpatient ischemic eval  - TTE then showed EF 65% with no segmental wall motion abnormality  - EKG unchanged from admission, no acute ST changes.  His cardiac enzymes this admission are all negative  - He received 325 mg yesterday.  Will continue 81 mg daily  - Continue statin  - Will hold off of ACEI for now.    Edema  - b/l pitting LE edema L>R is chronic.  He has no evidence of volume overload apart from his edema.  Pro-BNP = 614  - Will not diurese at this point    HTN  - BP well controlled, not on any BP medications  - monitor vitals closely, can continue amiodarone, atorvastatin    DM2  - Per Primary    - Other cardiovascular workup will depend on clinical course. All other workup per primary team.  - Will follow.    Henrietta Kim [facility]  Cardiology 48 year old male with PMH of afib, NSTEMI, osteomyelitis,  HTN and DM2, who presented to ED with chest pressure, admitted for management of PE. Patient was recently was admitted to [facility] for perforated Antral Gastric Ulcer, s/p Emergent Exp-lap Oomentopexy and plication 5/11. Found to have new onset afib with elevated troponin post-operatively indicative of NSTEMI, started on Amiodarone and converted to sinus rhythm, left foot osteo s/p debridement OR 5/28    Cardio consulted for chest pain after admission     PE (RUL subsegmental)  - Continue Heparin drip (full Ac nomogram).  can switch to NOAC as far as we are concerned.  Will defer to Pulm  - Venous USD negative.  - Monitor for s/s bleeding  - Continue to monitor on tele    Afib  - He went into Afib post op last admission requiring Amio load  - It was short-lived that it was decided he did not need long term AC, with plans to do an outpatient event monitor  - He remained in NSR throughout his stay prior to discharge  - He is on Amiodarone at 200 mg daily  - He had an episode of 11 beats of NSVT overnight.  If he continues to have this arrhythmia, may need to start low dose BB, though, we need to be cautious in setting of bradycardia  - Monitor electrolytes, replete to keep K>4 and Mag>2     NSTEMI vs demand ischemia  - He had elevated troponins post op from his recent admission, though all his CPK's were negative.  Plan was outpatient ischemic eval  - TTE then showed EF 65% with no segmental wall motion abnormality  - EKG unchanged from admission, no acute ST changes.  His cardiac enzymes this admission are all negative  - He received 325 mg yesterday.  Will continue 81 mg daily  - Continue statin  - Will hold off of ACEI for now.    Edema  - b/l pitting LE edema L>R is chronic.  He has no evidence of volume overload apart from his edema.  Pro-BNP = 614  - Will not diurese at this point    HTN  - BP well controlled, not on any BP medications  - monitor vitals closely, can continue amiodarone, atorvastatin    DM2  - Per Primary    - Other cardiovascular workup will depend on clinical course. All other workup per primary team.  - Will follow.    Henrietta Kim [facility]  Cardiology

## 2019-06-07 NOTE — PROGRESS NOTE ADULT - SUBJECTIVE AND OBJECTIVE BOX
[facility] -- Bolivar Carbajal, Brittany Gallegos, Reynaldo Sweeney Savella  Office # [phone]    Follow Up:  PE, recent NSTEMI    Subjective/Observations: Seen and evaluated, c/o constant midsternal pressure-like pain, worse and becoming sharp during inspiration.  Denies SOB, DUBOIS, or orthopnea.  Denies palpitations    REVIEW OF SYSTEMS: All other review of systems is negative unless indicated above    PAST MEDICAL & SURGICAL HISTORY:  BPH (benign prostatic hyperplasia)  Hypertension  Afib  Diabetes mellitus with no complication  H/O abdominal surgery    MEDICATIONS  (STANDING):  amiodarone    Tablet 200 milliGRAM(s) Oral daily  atorvastatin 80 milliGRAM(s) Oral at bedtime  dextrose 5%. 1000 milliLiter(s) (50 mL/Hr) IV Continuous <Continuous>  dextrose 50% Injectable 12.5 Gram(s) IV Push once  dextrose 50% Injectable 25 Gram(s) IV Push once  dextrose 50% Injectable 25 Gram(s) IV Push once  docusate sodium 100 milliGRAM(s) Oral three times a day  heparin  Infusion.  Unit(s)/Hr (15 mL/Hr) IV Continuous <Continuous>  insulin glargine Injectable (LANTUS) 10 Unit(s) SubCutaneous at bedtime  insulin lispro (HumaLOG) corrective regimen sliding scale   SubCutaneous three times a day before meals  insulin lispro (HumaLOG) corrective regimen sliding scale   SubCutaneous at bedtime  insulin lispro Injectable (HumaLOG) 3 Unit(s) SubCutaneous three times a day before meals  nicotine - 21 mG/24Hr(s) Patch 1 patch Transdermal daily  pantoprazole    Tablet 40 milliGRAM(s) Oral two times a day  polyethylene glycol 3350 17 Gram(s) Oral at bedtime  senna 2 Tablet(s) Oral at bedtime  tamsulosin 0.4 milliGRAM(s) Oral at bedtime    MEDICATIONS  (PRN):  ALBUTerol    90 MICROgram(s) HFA Inhaler 2 Puff(s) Inhalation every 6 hours PRN Shortness of Breath and/or Wheezing  dextrose 40% Gel 15 Gram(s) Oral once PRN Blood Glucose LESS THAN 70 milliGRAM(s)/deciliter  glucagon  Injectable 1 milliGRAM(s) IntraMuscular once PRN Glucose LESS THAN 70 milligrams/deciliter  heparin  Injectable 6500 Unit(s) IV Push every 6 hours PRN For aPTT less than 40  heparin  Injectable 3000 Unit(s) IV Push every 6 hours PRN For aPTT between 40 - 57    Allergies    fish (Hives)  No Known Drug Allergies    Intolerances    Vital Signs Last 24 Hrs  T(C): 36.7 (07 Jun 2019 07:17), Max: 37.4 (06 Jun 2019 15:15)  T(F): 98.1 (07 Jun 2019 07:17), Max: 99.4 (06 Jun 2019 15:15)  HR: 48 (07 Jun 2019 07:17) (48 - 57)  BP: 129/79 (07 Jun 2019 07:17) (106/65 - 131/74)  BP(mean): --  RR: 18 (07 Jun 2019 07:17) (16 - 189)  SpO2: 93% (07 Jun 2019 07:17) (92% - 98%)    I&O's Summary    06 Jun 2019 07:01  -  07 Jun 2019 07:00  --------------------------------------------------------  IN: 1020 mL / OUT: 500 mL / NET: 520 mL    PHYSICAL EXAM:  TELE: sinus johnny 40's-50's  Constitutional: NAD, awake and alert, well-developed  HEENT: Moist Mucous Membranes, Anicteric  Pulmonary: Non-labored, breath sounds are clear bilaterally, No wheezing, rales or rhonchi  Cardiovascular: Regular, S1 and S2, No murmurs, rubs, gallops or clicks  Gastrointestinal: Bowel Sounds present, soft, nontender.  Mid-abdominal incision healing  Lymph: No peripheral edema. No lymphadenopathy.  Skin: No visible rashes.  Left foot ulcer, dressing dry and intat.  Psych:  Mood & affect appropriate    LABS: All Labs Reviewed:                        8.9    5.65  )-----------( 301      ( 07 Jun 2019 05:05 )             28.3                         8.4    6.71  )-----------( 306      ( 06 Jun 2019 07:54 )             26.7                         8.7    6.93  )-----------( 320      ( 06 Jun 2019 05:05 )             27.5     07 Jun 2019 05:05    144    |  111    |  15     ----------------------------<  143    3.8     |  27     |  1.20   06 Jun 2019 05:05    144    |  111    |  15     ----------------------------<  193    3.7     |  26     |  1.30   05 Jun 2019 18:00    137    |  103    |  16     ----------------------------<  196    4.5     |  27     |  1.50     Ca    8.3        07 Jun 2019 05:05  Ca    7.9        06 Jun 2019 05:05  Ca    8.3        05 Jun 2019 18:00  Phos  3.3       07 Jun 2019 05:05  Mg     2.1       07 Jun 2019 05:05    TPro  6.7    /  Alb  2.9    /  TBili  0.2    /  DBili  x      /  AST  18     /  ALT  17     /  AlkPhos  118    05 Jun 2019 18:00    PT/INR - ( 06 Jun 2019 01:27 )   PT: 12.8 sec;   INR: 1.13 ratio      PTT - ( 07 Jun 2019 05:05 )  PTT:73.8 sec  CARDIAC MARKERS ( 07 Jun 2019 07:54 )  .025 ng/mL / x     / 64 U/L / x     / 3.0 ng/mL  CARDIAC MARKERS ( 06 Jun 2019 23:36 )  .022 ng/mL / x     / 75 U/L / x     / 3.5 ng/mL  CARDIAC MARKERS ( 06 Jun 2019 17:10 )  .021 ng/mL / x     / 81 U/L / x     / 4.1 ng/mL  CARDIAC MARKERS ( 06 Jun 2019 11:03 )  .022 ng/mL / x     / 91 U/L / x     / 4.3 ng/mL  CARDIAC MARKERS ( 05 Jun 2019 23:10 )  .019 ng/mL / x     / x     / x     / x      CARDIAC MARKERS ( 05 Jun 2019 18:00 )  .017 ng/mL / x     / x     / x     / x        Henrietta Kim NP  Cardiology      < from: TTE Echo Doppler w/o Cont (05.16.19 @ 09:45) >     EXAM:  ECHO TTE WO CON COMP W DOPPLR         PROCEDURE DATE:  05/16/2019        INTERPRETATION:  INDICATION: Elevated troponin    Blood Pressure 137/62    Height 187.96 cm     Weight 81.6 kg       BSA   2.1 sq m    Dimensions:    LA 4.1       Normal Values: 2.0 - 4.0 cm    Ao 3.6        Normal Values: 2.0 - 3.8 cm  SEPTUM 1.3       Normal Values: 0.6 - 1.2 cm  PWT 1.3       Normal Values: 0.6 - 1.1 cm  LVIDd 6.0         Normal Values: 3.0 - 5.6 cm  LVIDs 3.1         Normal Values: 1.8 - 4.0 cm      OBSERVATIONS:    Mitral Valve: Thickened leaflets, moderate MR.  Aortic Valve/Aorta: Sclerotic trileaflet aortic valve with normal   opening. Trace AI  Tricuspid Valve: normal with mild TR.  Pulmonic Valve: normal  Left Atrium: Mildly dilated  Right Atrium: normal  Left Ventricle: normal LV size and systolic function, estimated LVEF of   65%. No evidence of segmental dysfunction. Basal septal hypertrophy is   present  Right Ventricle: normal size and systolic function.  Pericardium/Pleura: normal, no significant pericardial effusion.    Conclusion:     normal LV size and systolic function, estimated LVEF of 65%. No evidence   of segmental dysfunction  Normal RV size and systolic function.   Mild left atrial enlargement  Sclerotic trileaflet aortic valve with normal opening. Trace AI  Mitral valve with thickened leaflets and moderate MR  Mild TR  No significant pericardial effusion.     MIROSLAVA CASTAÑEDA   This document has been electronically signed. May 17 2019  7:45AM     < end of copied text >    < from: CT Angio Chest w/ IV Cont (06.06.19 @ 00:07) >    EXAM:  CT ANGIO CHEST (W)AW IC                            PROCEDURE DATE:  06/06/2019          INTERPRETATION:  CLINICAL INFORMATION: Chest pain.    COMPARISON: None.    PROCEDURE:   CT Angiography of the Chest.  95 ml of Omnipaque 350 was injectedintravenously. 5 ml were discarded.  Sagittal and coronal reformats were performed as well as 3D (MIP)   reconstructions.      FINDINGS:    LUNGS AND AIRWAYS: Patent central airways.  Paraseptal and centrilobular   emphysema. Few scattered small pulmonary nodules up to 4 mm within the   right lower lobe along the major fissure (series 3, image 61). Bilateral   lower lobe subsegmental atelectasis.    PLEURA: Trace left pleural effusion.    MEDIASTINUM AND ANAYA: No lymphadenopathy.    VESSELS: Adequate opacification the pulmonary arteries. Subsegmental   right upper lobe filling defects consistent with pulmonary emboli.   Question subsegmental right middle lobe pulmonary embolus.    HEART: Heart size is normal. No pericardial effusion. Coronaryartery   calcification.    CHEST WALL AND LOWER NECK: Mild bilateral gynecomastia.     VISUALIZED UPPER ABDOMEN: Midline postsurgical change within the upper   abdominal wall.    BONES: Chronic fracture deformity of the left posterolateral eighth and  ninth ribs. Multilevel degenerative changes of the spine.    IMPRESSION:     Subsegmental right upper lobe pulmonary emboli. Question right middle   lobe subsegmental pulmonary embolus.    Emphysema.    Few scattered pulmonary nodules measuring up to 4 mm. 1 year interval   noncontrast chest CT may be obtained to demonstrate stability.    Dr. Flores discussed the above findings with Dr. Sauceda at 1:04 AM on   6/6/2019 with read back.    ABHISHEK FLORES M.D., ATTENDING RADIOLOGIST  This document has been electronically signed. Jun 6 2019  1:09AM      < end of copied text >    < from: US Duplex Venous Lower Ext Complete, Bilateral (06.06.19 @ 09:30) >    EXAM:  US DPLX LWR EXT VEINS COMPL BI                          PROCEDURE DATE:  06/06/2019      INTERPRETATION:  CLINICAL INFORMATION: Bilateral leg swelling    COMPARISON: None available.    TECHNIQUE: Duplex sonography of the BILATERAL LOWER extremities with   color and spectral Doppler, with and without compression.      FINDINGS:    There is normal compressibility of the bilateral common femoral, femoral   and popliteal veins.     Doppler examination shows normal spontaneous and phasic flow.    No calf vein thrombosis is detected.    IMPRESSION:     No evidence of bilateral lower extremity deep venous thrombosis.    WENDI HANSON M.D., ATTENDING RADIOLOGIST  This document has been electronically signed. Jun 6 2019  9:39AM      < end of copied text > [facility] -- Bolivar Carbajal, Brittany Gallegos, Reynaldo Sweeney Savella  Office # [phone]    Follow Up:  PE, recent NSTEMI    Subjective/Observations: Seen and evaluated, c/o constant midsternal pressure-like pain, worse and becoming sharp during inspiration.  Denies SOB, DUBOIS, or orthopnea.  Denies palpitations    REVIEW OF SYSTEMS: All other review of systems is negative unless indicated above    PAST MEDICAL & SURGICAL HISTORY:  BPH (benign prostatic hyperplasia)  Hypertension  Afib  Diabetes mellitus with no complication  H/O abdominal surgery    MEDICATIONS  (STANDING):  amiodarone    Tablet 200 milliGRAM(s) Oral daily  atorvastatin 80 milliGRAM(s) Oral at bedtime  dextrose 5%. 1000 milliLiter(s) (50 mL/Hr) IV Continuous <Continuous>  dextrose 50% Injectable 12.5 Gram(s) IV Push once  dextrose 50% Injectable 25 Gram(s) IV Push once  dextrose 50% Injectable 25 Gram(s) IV Push once  docusate sodium 100 milliGRAM(s) Oral three times a day  heparin  Infusion.  Unit(s)/Hr (15 mL/Hr) IV Continuous <Continuous>  insulin glargine Injectable (LANTUS) 10 Unit(s) SubCutaneous at bedtime  insulin lispro (HumaLOG) corrective regimen sliding scale   SubCutaneous three times a day before meals  insulin lispro (HumaLOG) corrective regimen sliding scale   SubCutaneous at bedtime  insulin lispro Injectable (HumaLOG) 3 Unit(s) SubCutaneous three times a day before meals  nicotine - 21 mG/24Hr(s) Patch 1 patch Transdermal daily  pantoprazole    Tablet 40 milliGRAM(s) Oral two times a day  polyethylene glycol 3350 17 Gram(s) Oral at bedtime  senna 2 Tablet(s) Oral at bedtime  tamsulosin 0.4 milliGRAM(s) Oral at bedtime    MEDICATIONS  (PRN):  ALBUTerol    90 MICROgram(s) HFA Inhaler 2 Puff(s) Inhalation every 6 hours PRN Shortness of Breath and/or Wheezing  dextrose 40% Gel 15 Gram(s) Oral once PRN Blood Glucose LESS THAN 70 milliGRAM(s)/deciliter  glucagon  Injectable 1 milliGRAM(s) IntraMuscular once PRN Glucose LESS THAN 70 milligrams/deciliter  heparin  Injectable 6500 Unit(s) IV Push every 6 hours PRN For aPTT less than 40  heparin  Injectable 3000 Unit(s) IV Push every 6 hours PRN For aPTT between 40 - 57    Allergies    fish (Hives)  No Known Drug Allergies    Intolerances    Vital Signs Last 24 Hrs  T(C): 36.7 (07 Jun 2019 07:17), Max: 37.4 (06 Jun 2019 15:15)  T(F): 98.1 (07 Jun 2019 07:17), Max: 99.4 (06 Jun 2019 15:15)  HR: 48 (07 Jun 2019 07:17) (48 - 57)  BP: 129/79 (07 Jun 2019 07:17) (106/65 - 131/74)  BP(mean): --  RR: 18 (07 Jun 2019 07:17) (16 - 189)  SpO2: 93% (07 Jun 2019 07:17) (92% - 98%)    I&O's Summary    06 Jun 2019 07:01  -  07 Jun 2019 07:00  --------------------------------------------------------  IN: 1020 mL / OUT: 500 mL / NET: 520 mL    PHYSICAL EXAM:  TELE: sinus johnny 40's-50's  Constitutional: NAD, awake and alert, well-developed  HEENT: Moist Mucous Membranes, Anicteric  Pulmonary: Non-labored, breath sounds are clear bilaterally, No wheezing, rales or rhonchi  Cardiovascular: Regular, S1 and S2, No murmurs, rubs, gallops or clicks  Gastrointestinal: Bowel Sounds present, soft, nontender.  Mid-abdominal incision healing  Lymph: 1+ RLR, 2+ LLE edema. No lymphadenopathy.  Skin: No visible rashes.  Left foot ulcer, dressing dry and intat.  Psych:  Mood & affect appropriate    LABS: All Labs Reviewed:                        8.9    5.65  )-----------( 301      ( 07 Jun 2019 05:05 )             28.3                         8.4    6.71  )-----------( 306      ( 06 Jun 2019 07:54 )             26.7                         8.7    6.93  )-----------( 320      ( 06 Jun 2019 05:05 )             27.5     07 Jun 2019 05:05    144    |  111    |  15     ----------------------------<  143    3.8     |  27     |  1.20   06 Jun 2019 05:05    144    |  111    |  15     ----------------------------<  193    3.7     |  26     |  1.30   05 Jun 2019 18:00    137    |  103    |  16     ----------------------------<  196    4.5     |  27     |  1.50     Ca    8.3        07 Jun 2019 05:05  Ca    7.9        06 Jun 2019 05:05  Ca    8.3        05 Jun 2019 18:00  Phos  3.3       07 Jun 2019 05:05  Mg     2.1       07 Jun 2019 05:05    TPro  6.7    /  Alb  2.9    /  TBili  0.2    /  DBili  x      /  AST  18     /  ALT  17     /  AlkPhos  118    05 Jun 2019 18:00    PT/INR - ( 06 Jun 2019 01:27 )   PT: 12.8 sec;   INR: 1.13 ratio      PTT - ( 07 Jun 2019 05:05 )  PTT:73.8 sec  CARDIAC MARKERS ( 07 Jun 2019 07:54 )  .025 ng/mL / x     / 64 U/L / x     / 3.0 ng/mL  CARDIAC MARKERS ( 06 Jun 2019 23:36 )  .022 ng/mL / x     / 75 U/L / x     / 3.5 ng/mL  CARDIAC MARKERS ( 06 Jun 2019 17:10 )  .021 ng/mL / x     / 81 U/L / x     / 4.1 ng/mL  CARDIAC MARKERS ( 06 Jun 2019 11:03 )  .022 ng/mL / x     / 91 U/L / x     / 4.3 ng/mL  CARDIAC MARKERS ( 05 Jun 2019 23:10 )  .019 ng/mL / x     / x     / x     / x      CARDIAC MARKERS ( 05 Jun 2019 18:00 )  .017 ng/mL / x     / x     / x     / x        Henrietta Kim NP  Cardiology      < from: TTE Echo Doppler w/o Cont (05.16.19 @ 09:45) >     EXAM:  ECHO TTE WO CON COMP W DOPPLR         PROCEDURE DATE:  05/16/2019        INTERPRETATION:  INDICATION: Elevated troponin    Blood Pressure 137/62    Height 187.96 cm     Weight 81.6 kg       BSA   2.1 sq m    Dimensions:    LA 4.1       Normal Values: 2.0 - 4.0 cm    Ao 3.6        Normal Values: 2.0 - 3.8 cm  SEPTUM 1.3       Normal Values: 0.6 - 1.2 cm  PWT 1.3       Normal Values: 0.6 - 1.1 cm  LVIDd 6.0         Normal Values: 3.0 - 5.6 cm  LVIDs 3.1         Normal Values: 1.8 - 4.0 cm      OBSERVATIONS:    Mitral Valve: Thickened leaflets, moderate MR.  Aortic Valve/Aorta: Sclerotic trileaflet aortic valve with normal   opening. Trace AI  Tricuspid Valve: normal with mild TR.  Pulmonic Valve: normal  Left Atrium: Mildly dilated  Right Atrium: normal  Left Ventricle: normal LV size and systolic function, estimated LVEF of   65%. No evidence of segmental dysfunction. Basal septal hypertrophy is   present  Right Ventricle: normal size and systolic function.  Pericardium/Pleura: normal, no significant pericardial effusion.    Conclusion:     normal LV size and systolic function, estimated LVEF of 65%. No evidence   of segmental dysfunction  Normal RV size and systolic function.   Mild left atrial enlargement  Sclerotic trileaflet aortic valve with normal opening. Trace AI  Mitral valve with thickened leaflets and moderate MR  Mild TR  No significant pericardial effusion.     MIROSLAVA CASTAÑEDA   This document has been electronically signed. May 17 2019  7:45AM     < end of copied text >    < from: CT Angio Chest w/ IV Cont (06.06.19 @ 00:07) >    EXAM:  CT ANGIO CHEST (W)AW IC                            PROCEDURE DATE:  06/06/2019          INTERPRETATION:  CLINICAL INFORMATION: Chest pain.    COMPARISON: None.    PROCEDURE:   CT Angiography of the Chest.  95 ml of Omnipaque 350 was injectedintravenously. 5 ml were discarded.  Sagittal and coronal reformats were performed as well as 3D (MIP)   reconstructions.      FINDINGS:    LUNGS AND AIRWAYS: Patent central airways.  Paraseptal and centrilobular   emphysema. Few scattered small pulmonary nodules up to 4 mm within the   right lower lobe along the major fissure (series 3, image 61). Bilateral   lower lobe subsegmental atelectasis.    PLEURA: Trace left pleural effusion.    MEDIASTINUM AND ANAYA: No lymphadenopathy.    VESSELS: Adequate opacification the pulmonary arteries. Subsegmental   right upper lobe filling defects consistent with pulmonary emboli.   Question subsegmental right middle lobe pulmonary embolus.    HEART: Heart size is normal. No pericardial effusion. Coronaryartery   calcification.    CHEST WALL AND LOWER NECK: Mild bilateral gynecomastia.     VISUALIZED UPPER ABDOMEN: Midline postsurgical change within the upper   abdominal wall.    BONES: Chronic fracture deformity of the left posterolateral eighth and  ninth ribs. Multilevel degenerative changes of the spine.    IMPRESSION:     Subsegmental right upper lobe pulmonary emboli. Question right middle   lobe subsegmental pulmonary embolus.    Emphysema.    Few scattered pulmonary nodules measuring up to 4 mm. 1 year interval   noncontrast chest CT may be obtained to demonstrate stability.    Dr. Flores discussed the above findings with Dr. Sauceda at 1:04 AM on   6/6/2019 with read back.    ABHISHEK FLORES M.D., ATTENDING RADIOLOGIST  This document has been electronically signed. Jun 6 2019  1:09AM      < end of copied text >    < from: US Duplex Venous Lower Ext Complete, Bilateral (06.06.19 @ 09:30) >    EXAM:  US DPLX LWR EXT VEINS COMPL BI                          PROCEDURE DATE:  06/06/2019      INTERPRETATION:  CLINICAL INFORMATION: Bilateral leg swelling    COMPARISON: None available.    TECHNIQUE: Duplex sonography of the BILATERAL LOWER extremities with   color and spectral Doppler, with and without compression.      FINDINGS:    There is normal compressibility of the bilateral common femoral, femoral   and popliteal veins.     Doppler examination shows normal spontaneous and phasic flow.    No calf vein thrombosis is detected.    IMPRESSION:     No evidence of bilateral lower extremity deep venous thrombosis.    WENDI HANSON M.D., ATTENDING RADIOLOGIST  This document has been electronically signed. Jun 6 2019  9:39AM      < end of copied text >

## 2019-06-07 NOTE — PROGRESS NOTE ADULT - SUBJECTIVE AND OBJECTIVE BOX
CAPILLARY BLOOD GLUCOSE      POCT Blood Glucose.: 180 mg/dL (06 Jun 2019 21:34)  POCT Blood Glucose.: 168 mg/dL (06 Jun 2019 16:40)  POCT Blood Glucose.: 130 mg/dL (06 Jun 2019 11:24)      Vital Signs Last 24 Hrs  T(C): 36.7 (07 Jun 2019 07:17), Max: 37.4 (06 Jun 2019 15:15)  T(F): 98.1 (07 Jun 2019 07:17), Max: 99.4 (06 Jun 2019 15:15)  HR: 48 (07 Jun 2019 07:17) (48 - 57)  BP: 129/79 (07 Jun 2019 07:17) (106/65 - 131/74)  BP(mean): --  RR: 18 (07 Jun 2019 07:17) (16 - 189)  SpO2: 93% (07 Jun 2019 07:17) (92% - 98%)    General: WN/WD NAD  Respiratory: CTA B/L  CV: RRR, S1S2, no murmurs, rubs or gallops  Abdominal: Soft, NT, ND +BS, Last BM  Extremities:le foot dsg intact     06-07    144  |  111<H>  |  15  ----------------------------<  143<H>  3.8   |  27  |  1.20    Ca    8.3<L>      07 Jun 2019 05:05  Phos  3.3     06-07  Mg     2.1     06-07    TPro  6.7  /  Alb  2.9<L>  /  TBili  0.2  /  DBili  x   /  AST  18  /  ALT  17  /  AlkPhos  118  06-05      atorvastatin 80 milliGRAM(s) Oral at bedtime  dextrose 40% Gel 15 Gram(s) Oral once PRN  dextrose 50% Injectable 12.5 Gram(s) IV Push once  dextrose 50% Injectable 25 Gram(s) IV Push once  dextrose 50% Injectable 25 Gram(s) IV Push once  glucagon  Injectable 1 milliGRAM(s) IntraMuscular once PRN  insulin glargine Injectable (LANTUS) 10 Unit(s) SubCutaneous at bedtime  insulin lispro (HumaLOG) corrective regimen sliding scale   SubCutaneous three times a day before meals  insulin lispro (HumaLOG) corrective regimen sliding scale   SubCutaneous at bedtime  insulin lispro Injectable (HumaLOG) 3 Unit(s) SubCutaneous three times a day before meals

## 2019-06-07 NOTE — PROGRESS NOTE ADULT - PROBLEM SELECTOR PLAN 1
CTA- subsegmental right upper lobe PE, questionable right middle lobe subsegmental PE.  - Pt CP continued, substernal , s/p nitro x3, ASA, morphine without relief  - Cardio consulted STAT, concern for ACS  vs GI upset  - CE STAT, EKG no change from previous   - Serial CE and EKGs   - s/p Heparin 6500units IV push x1 in the ED  - continue 25,000units heparin infusion at 15cc/hr  - f/u LE dopplers as patient has LE swelling R>L CTA- subsegmental right upper lobe PE, questionable right middle lobe subsegmental PE.  - Pt CP continued, substernal ,  morphine 2 mg provided relief yesterday, EKGs unchanged, CE negative x 4   - Possibly GI vs symptomatic anemia in setting of PE, Start Maalox today   - Cardio consulted    - Serial CE and EKGs   - s/p Heparin 6500units IV push x1 in the ED  - continue 25,000units heparin infusion at 15cc/hr  - LE dopplers negative CTA- subsegmental right upper lobe PE, questionable right middle lobe subsegmental PE.  - Mild to moderate Right sided CP reproduced upon palpation of the upper right area of the chest. Possibly musculoskeletal/costochondritis vs pleuritic pain from PE  - NSAIDs contraindicated in the setting of recent perforated gastric ulcer   - Cardio consulted  - Serial CE and EKGs negative   - s/p Heparin 6500units IV push x1 in the ED  - continue 25,000units heparin infusion at 15cc/hr  - LE dopplers negative

## 2019-06-07 NOTE — ADVANCED PRACTICE NURSE CONSULT - ASSESSMENT
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 Surgical wound sutures are intact proximal and distal mid wound open about 2 x 1 x 1.5 area is dry dorsal is a darker brown and edematous pulse not palpable    RN present for consult and educated in care of this patients skin care and recommendations

## 2019-06-08 LAB
ANION GAP SERPL CALC-SCNC: 7 MMOL/L — SIGNIFICANT CHANGE UP (ref 5–17)
APTT BLD: 88.2 SEC — HIGH (ref 28.5–37)
BUN SERPL-MCNC: 12 MG/DL — SIGNIFICANT CHANGE UP (ref 7–23)
CALCIUM SERPL-MCNC: 8.6 MG/DL — SIGNIFICANT CHANGE UP (ref 8.5–10.1)
CHLORIDE SERPL-SCNC: 110 MMOL/L — HIGH (ref 96–108)
CO2 SERPL-SCNC: 27 MMOL/L — SIGNIFICANT CHANGE UP (ref 22–31)
CREAT SERPL-MCNC: 1.1 MG/DL — SIGNIFICANT CHANGE UP (ref 0.5–1.3)
GLUCOSE SERPL-MCNC: 97 MG/DL — SIGNIFICANT CHANGE UP (ref 70–99)
HCT VFR BLD CALC: 29.9 % — LOW (ref 39–50)
HGB BLD-MCNC: 9.3 G/DL — LOW (ref 13–17)
MCHC RBC-ENTMCNC: 30.7 PG — SIGNIFICANT CHANGE UP (ref 27–34)
MCHC RBC-ENTMCNC: 31.1 GM/DL — LOW (ref 32–36)
MCV RBC AUTO: 98.7 FL — SIGNIFICANT CHANGE UP (ref 80–100)
NRBC # BLD: 0 /100 WBCS — SIGNIFICANT CHANGE UP (ref 0–0)
PLATELET # BLD AUTO: 300 K/UL — SIGNIFICANT CHANGE UP (ref 150–400)
POTASSIUM SERPL-MCNC: 4 MMOL/L — SIGNIFICANT CHANGE UP (ref 3.5–5.3)
POTASSIUM SERPL-SCNC: 4 MMOL/L — SIGNIFICANT CHANGE UP (ref 3.5–5.3)
RBC # BLD: 3.03 M/UL — LOW (ref 4.2–5.8)
RBC # FLD: 14.2 % — SIGNIFICANT CHANGE UP (ref 10.3–14.5)
SODIUM SERPL-SCNC: 144 MMOL/L — SIGNIFICANT CHANGE UP (ref 135–145)
WBC # BLD: 5.84 K/UL — SIGNIFICANT CHANGE UP (ref 3.8–10.5)
WBC # FLD AUTO: 5.84 K/UL — SIGNIFICANT CHANGE UP (ref 3.8–10.5)

## 2019-06-08 PROCEDURE — 99232 SBSQ HOSP IP/OBS MODERATE 35: CPT

## 2019-06-08 RX ORDER — ACETAMINOPHEN 500 MG
1000 TABLET ORAL ONCE
Refills: 0 | Status: COMPLETED | OUTPATIENT
Start: 2019-06-08 | End: 2019-06-08

## 2019-06-08 RX ORDER — AMIODARONE HYDROCHLORIDE 400 MG/1
100 TABLET ORAL DAILY
Refills: 0 | Status: DISCONTINUED | OUTPATIENT
Start: 2019-06-09 | End: 2019-06-13

## 2019-06-08 RX ADMIN — Medication 100 MILLIGRAM(S): at 21:34

## 2019-06-08 RX ADMIN — SENNA PLUS 2 TABLET(S): 8.6 TABLET ORAL at 21:34

## 2019-06-08 RX ADMIN — Medication 81 MILLIGRAM(S): at 12:16

## 2019-06-08 RX ADMIN — Medication 3 UNIT(S): at 17:13

## 2019-06-08 RX ADMIN — ALBUTEROL 2 PUFF(S): 90 AEROSOL, METERED ORAL at 05:15

## 2019-06-08 RX ADMIN — Medication 1 PATCH: at 07:29

## 2019-06-08 RX ADMIN — Medication 1 GRAM(S): at 05:14

## 2019-06-08 RX ADMIN — HEPARIN SODIUM 1500 UNIT(S)/HR: 5000 INJECTION INTRAVENOUS; SUBCUTANEOUS at 08:44

## 2019-06-08 RX ADMIN — ATORVASTATIN CALCIUM 80 MILLIGRAM(S): 80 TABLET, FILM COATED ORAL at 21:34

## 2019-06-08 RX ADMIN — Medication 400 MILLIGRAM(S): at 23:07

## 2019-06-08 RX ADMIN — Medication 1: at 17:13

## 2019-06-08 RX ADMIN — TAMSULOSIN HYDROCHLORIDE 0.4 MILLIGRAM(S): 0.4 CAPSULE ORAL at 21:34

## 2019-06-08 RX ADMIN — Medication 1 PATCH: at 19:47

## 2019-06-08 RX ADMIN — PANTOPRAZOLE SODIUM 40 MILLIGRAM(S): 20 TABLET, DELAYED RELEASE ORAL at 05:14

## 2019-06-08 RX ADMIN — INSULIN GLARGINE 10 UNIT(S): 100 INJECTION, SOLUTION SUBCUTANEOUS at 21:55

## 2019-06-08 RX ADMIN — PANTOPRAZOLE SODIUM 40 MILLIGRAM(S): 20 TABLET, DELAYED RELEASE ORAL at 17:51

## 2019-06-08 RX ADMIN — Medication 1 PATCH: at 12:16

## 2019-06-08 RX ADMIN — Medication 1 PATCH: at 12:15

## 2019-06-08 RX ADMIN — Medication 3 UNIT(S): at 12:28

## 2019-06-08 RX ADMIN — Medication 3 UNIT(S): at 08:22

## 2019-06-08 RX ADMIN — AMIODARONE HYDROCHLORIDE 200 MILLIGRAM(S): 400 TABLET ORAL at 05:14

## 2019-06-08 RX ADMIN — Medication 1000 MILLIGRAM(S): at 23:51

## 2019-06-08 RX ADMIN — POLYETHYLENE GLYCOL 3350 17 GRAM(S): 17 POWDER, FOR SOLUTION ORAL at 21:34

## 2019-06-08 RX ADMIN — Medication 1 GRAM(S): at 17:51

## 2019-06-08 RX ADMIN — Medication 100 MILLIGRAM(S): at 13:26

## 2019-06-08 NOTE — CONSULT NOTE ADULT - ATTENDING COMMENTS
Surgical site intact  Dressing changed  Weightbearing: full  Will follow, patient may need suture removal next week
Chart reviewed    Patient seen and examined    Agree with plan as outlined above

## 2019-06-08 NOTE — PROGRESS NOTE ADULT - SUBJECTIVE AND OBJECTIVE BOX
Patient is a 48y old  Male who presents with a chief complaint of PE (07 Jun 2019 07:47)      48 year old male with PMH of  HTN and DM2, who presented with Perforated Antral Gastric Ulcer, s/p Emergent Exp-lap Oomentopexy and plication 5/11, Proxismal afib,  with elevated troponin post-operatively indicative of NSTEMI, started on Amiodarone and converted to sinus rhythm,  left foot osteo s/p debridement 5/28, now presenting for CP and dyspnea x 1 day. Admitted for sub segmental PE RUL. on Heparin     Interval HPI/Overnight events: 11 beats of NSVT overnight. Patient was asymptomatic Patient states his chest pain continued throughout the night,  2/10, pressure-like, non radiating, unchanged by position or activity  and unable to receive dose of morphine overnight given bradycardia in high 40's. Chest pain reproduced upon palpation of the right upper area of the chest. No other complaints      Vital Signs Last 24 Hrs  T(C): 36.7 (08 Jun 2019 19:34), Max: 36.9 (07 Jun 2019 23:33)  T(F): 98.1 (08 Jun 2019 19:34), Max: 98.5 (08 Jun 2019 05:14)  HR: 53 (08 Jun 2019 19:34) (49 - 55)  BP: 120/69 (08 Jun 2019 19:34) (99/58 - 130/80)  BP(mean): --  RR: 20 (08 Jun 2019 19:34) (18 - 21)  SpO2: 99% (08 Jun 2019 19:34) (95% - 100%)    06-08    144  |  110<H>  |  12  ----------------------------<  97  4.0   |  27  |  1.10    Ca    8.6      08 Jun 2019 08:10  Phos  3.3     06-07  Mg     2.1     06-07                            9.3    5.84  )-----------( 300      ( 08 Jun 2019 08:10 )             29.9     PTT - ( 08 Jun 2019 08:10 )  PTT:88.2 sec  CAPILLARY BLOOD GLUCOSE      POCT Blood Glucose.: 186 mg/dL (08 Jun 2019 17:05)  POCT Blood Glucose.: 126 mg/dL (08 Jun 2019 12:24)  POCT Blood Glucose.: 107 mg/dL (08 Jun 2019 07:43)  POCT Blood Glucose.: 170 mg/dL (07 Jun 2019 21:39)              ALBUTerol    90 MICROgram(s) HFA Inhaler 2 Puff(s) Inhalation every 6 hours PRN  amiodarone    Tablet 100 milliGRAM(s) Oral daily  aspirin enteric coated 81 milliGRAM(s) Oral daily  atorvastatin 80 milliGRAM(s) Oral at bedtime  dextrose 40% Gel 15 Gram(s) Oral once PRN  dextrose 5%. 1000 milliLiter(s) IV Continuous <Continuous>  dextrose 50% Injectable 12.5 Gram(s) IV Push once  dextrose 50% Injectable 25 Gram(s) IV Push once  dextrose 50% Injectable 25 Gram(s) IV Push once  docusate sodium 100 milliGRAM(s) Oral three times a day  glucagon  Injectable 1 milliGRAM(s) IntraMuscular once PRN  heparin  Infusion.  Unit(s)/Hr IV Continuous <Continuous>  heparin  Injectable 6500 Unit(s) IV Push every 6 hours PRN  heparin  Injectable 3000 Unit(s) IV Push every 6 hours PRN  insulin glargine Injectable (LANTUS) 10 Unit(s) SubCutaneous at bedtime  insulin lispro (HumaLOG) corrective regimen sliding scale   SubCutaneous three times a day before meals  insulin lispro (HumaLOG) corrective regimen sliding scale   SubCutaneous at bedtime  insulin lispro Injectable (HumaLOG) 3 Unit(s) SubCutaneous three times a day before meals  nicotine - 21 mG/24Hr(s) Patch 1 patch Transdermal daily  pantoprazole    Tablet 40 milliGRAM(s) Oral two times a day  polyethylene glycol 3350 17 Gram(s) Oral at bedtime  senna 2 Tablet(s) Oral at bedtime  sucralfate 1 Gram(s) Oral two times a day  tamsulosin 0.4 milliGRAM(s) Oral at bedtime        REVIEW OF SYSTEMS:  CONSTITUTIONAL: No fever, weight loss, or fatigue  EYES: No eye pain, visual disturbances, or discharge  ENMT: No difficulty hearing, tinnitus, vertigo; No sinus or throat pain  NECK: No pain or stiffness  RESPIRATORY: No cough, wheezing, chills or hemoptysis; No shortness of breath  CARDIOVASCULAR: + chest pain, Denies palpitations, dizziness, or leg swelling  GASTROINTESTINAL: No abdominal or epigastric pain. No nausea, vomiting, or hematemesis; No diarrhea or constipation; No melena or hematochezia  GENITOURINARY: No dysuria, frequency, hematuria, or incontinence  NEUROLOGICAL: No headaches, memory loss, loss of strength, numbness, or tremors  SKIN: No itching, burning, rashes, or lesions   LYMPH NODES: No enlarged glands  ENDOCRINE: No heat or cold intolerance; No hair loss  MUSCULOSKELETAL: No joint pain or swelling; No muscle, back, or extremity pain  PSYCHIATRIC: No depression, anxiety, mood swings, or difficulty sleeping  HEME/LYMPH: No easy bruising, or bleeding gums      RADIOLOGY & ADDITIONAL TESTS:     Imaging Personally Reviewed:  [x ] YES  [ ] NO    Consultant(s) Notes Reviewed:  [x ] YES  [ ] NO    PHYSICAL EXAM:  GENERAL: NAD, well-developed  HEAD: Atraumatic, Normocephalic  EYES: EOMI, PERRLA, conjunctiva and sclera clear  ENMT: No tonsillar erythema, exudates, or enlargement; Moist mucous membranes  NECK: Supple, No JVD  NERVOUS SYSTEM: Alert & Oriented X3, Good concentration; Motor strength 5/5 B/L upper and lower extremities  CHEST/LUNG: Clear to auscultation bilaterally, No rales, rhonchi, wheezing, or rubs.  Chest pain reproduced upon palpation of the upper right area of the chest.  HEART: Regular rate and rhythm; No murmurs, rubs, or gallops  ABDOMEN: Soft, Nontender, Nondistended; Bowel sounds present  EXTREMITIES: 2+ Peripheral Pulses, 1+ pitting edema of ankles b/l, bandaged left foot        Care Discussed with Consultants/Other Providers [x ] YES  [ ] NO      Advanced care planning discussed with patient/family. Advanced care planning forms reviewed/discussed/completed. 20 minutes spent.

## 2019-06-08 NOTE — PROGRESS NOTE ADULT - PROBLEM SELECTOR PLAN 1
PE, PAF, GI bleed  Anemia  GI eval noted  on Heparin for PE  out of bed as tolerated  increase activity  serial Hgb  cvs regimen and AF - rate control and BP control  tolerating room air  smoking cess ed - counseling - Bronchodilators for emphysema  will need PFT as outpatient - and CT chest in 6 months for pulm nodules follow up

## 2019-06-08 NOTE — PROGRESS NOTE ADULT - PROBLEM SELECTOR PLAN 3
- Persistent bradycardia in 50's ,short 2 min episodes of high 40's, likely due to increased vagal tone  - CP experiencing may be related to symptomatic anemia exacerbated by bradycardia, poor perfusion  - Consider transfusion

## 2019-06-08 NOTE — PROGRESS NOTE ADULT - PROBLEM SELECTOR PLAN 4
-  Afib post op for gastric plication for perforated ulcer, last admission requiring Amio load  - Afib short-lived that it was decided he did not need long term AC, with plans to do an outpatient event monitor, NSR throughout his stay prior to discharge  - Continue Amiodarone at 200 mg daily

## 2019-06-08 NOTE — PROGRESS NOTE ADULT - SUBJECTIVE AND OBJECTIVE BOX
[facility]  Tor Wiseman PA-C  [address] [place] [address]  [phone]      INTERVAL HPI/OVERNIGHT EVENTS:  on hep gtt  no melena      MEDICATIONS  (STANDING):  amiodarone    Tablet 100 milliGRAM(s) Oral daily  aspirin enteric coated 81 milliGRAM(s) Oral daily  atorvastatin 80 milliGRAM(s) Oral at bedtime  dextrose 5%. 1000 milliLiter(s) (50 mL/Hr) IV Continuous <Continuous>  dextrose 50% Injectable 12.5 Gram(s) IV Push once  dextrose 50% Injectable 25 Gram(s) IV Push once  dextrose 50% Injectable 25 Gram(s) IV Push once  docusate sodium 100 milliGRAM(s) Oral three times a day  heparin  Infusion.  Unit(s)/Hr (15 mL/Hr) IV Continuous <Continuous>  insulin glargine Injectable (LANTUS) 10 Unit(s) SubCutaneous at bedtime  insulin lispro (HumaLOG) corrective regimen sliding scale   SubCutaneous three times a day before meals  insulin lispro (HumaLOG) corrective regimen sliding scale   SubCutaneous at bedtime  insulin lispro Injectable (HumaLOG) 3 Unit(s) SubCutaneous three times a day before meals  nicotine - 21 mG/24Hr(s) Patch 1 patch Transdermal daily  pantoprazole    Tablet 40 milliGRAM(s) Oral two times a day  polyethylene glycol 3350 17 Gram(s) Oral at bedtime  senna 2 Tablet(s) Oral at bedtime  sucralfate 1 Gram(s) Oral two times a day  tamsulosin 0.4 milliGRAM(s) Oral at bedtime    MEDICATIONS  (PRN):  ALBUTerol    90 MICROgram(s) HFA Inhaler 2 Puff(s) Inhalation every 6 hours PRN Shortness of Breath and/or Wheezing  dextrose 40% Gel 15 Gram(s) Oral once PRN Blood Glucose LESS THAN 70 milliGRAM(s)/deciliter  glucagon  Injectable 1 milliGRAM(s) IntraMuscular once PRN Glucose LESS THAN 70 milligrams/deciliter  heparin  Injectable 6500 Unit(s) IV Push every 6 hours PRN For aPTT less than 40  heparin  Injectable 3000 Unit(s) IV Push every 6 hours PRN For aPTT between 40 - 57      Allergies    fish (Hives)  No Known Drug Allergies    Intolerances        ROS:   General:  No wt loss, fevers, chills, night sweats, fatigue,   Eyes:  Good vision, no reported pain  ENT:  No sore throat, pain, runny nose, dysphagia  CV:  No pain, palpitations, hypo/hypertension  Resp:  No dyspnea, cough, tachypnea, wheezing  GI:  No pain, No nausea, No vomiting, No diarrhea, No constipation, No weight loss, No fever, No pruritis, No rectal bleeding, No tarry stools, No dysphagia,  :  No pain, bleeding, incontinence, nocturia  Muscle:  No pain, weakness  Neuro:  No weakness, tingling, memory problems  Psych:  No fatigue, insomnia, mood problems, depression  Endocrine:  No polyuria, polydipsia, cold/heat intolerance  Heme:  No petechiae, ecchymosis, easy bruisability  Skin:  No rash, tattoos, scars, edema      PHYSICAL EXAM:   Vital Signs:  Vital Signs Last 24 Hrs  T(C): 36.9 (2019 11:47), Max: 37.4 (2019 16:14)  T(F): 98.5 (2019 11:47), Max: 99.3 (2019 16:14)  HR: 55 (2019 11:47) (49 - 55)  BP: 99/58 (2019 11:47) (99/58 - 130/80)  BP(mean): --  RR: 20 (2019 11:47) (18 - 21)  SpO2: 95% (2019 11:47) (95% - 100%)  Daily     Daily Weight in k.6 (2019 05:14)    GENERAL:  Appears stated age, well-groomed, well-nourished, no distress  HEENT:  NC/AT,  conjunctivae clear and pink, no thyromegaly, nodules, adenopathy, no JVD, sclera -anicteric  CHEST:  Full & symmetric excursion, no increased effort, breath sounds clear  HEART:  Regular rhythm, S1, S2, no murmur/rub/S3/S4, no abdominal bruit, no edema  ABDOMEN:  Soft, non-tender, non-distended, normoactive bowel sounds,  no masses ,no hepato-splenomegaly, no signs of chronic liver disease  EXTEREMITIES:  no cyanosis,clubbing or edema  SKIN:  No rash/erythema/ecchymoses/petechiae/wounds/abscess/warm/dry  NEURO:  Alert, oriented, no asterixis, no tremor, no encephalopathy      LABS:                        9.3    5.84  )-----------( 300      ( 2019 08:10 )             29.9     06-08    144  |  110<H>  |  12  ----------------------------<  97  4.0   |  27  |  1.10    Ca    8.6      2019 08:10  Phos  3.3     06-07  Mg     2.1     06-07      PTT - ( 2019 08:10 )  PTT:88.2 sec      RADIOLOGY & ADDITIONAL TESTS:

## 2019-06-08 NOTE — PROGRESS NOTE ADULT - PROBLEM SELECTOR PLAN 8
hold home medication  low dose insulin coverage scale  10units Lantus at bedtime  3units Humalog before meals  hypoglycemic protocol  consistent carb diet

## 2019-06-08 NOTE — CONSULT NOTE ADULT - SUBJECTIVE AND OBJECTIVE BOX
Chief Complaint:  Patient is a 48y old  Male who presents with a chief complaint of PE   History of Present Illness: 	  49 yo M PMH Afib (on amiodarone), BPH, DM2, hx of NSTEMI, osteomyelitis of left foot s/p toe amputation presenting to the ED for chest pressure that started this afternoon. Of note, patient was discharged from [facility] this afternoon, and shortly after returning home, while at rest, patient felt 8/10 chest pressure that was non radiating. There was a short episode of SOB and dizziness but it resolved shortly afterwards but the chest pressure continued. Patient denies headaches, chest pain, abdominal pain, n/v/d or LE pain.     In the ED:  vitals WNL  labs significant for Cr 1.5, d-dimer 660, rBNP 614  EKG- NSR rate 60  CTA- subsegmental right upper lobe PE. questionable right middle lobe subsegmental PE.  Scattered pulmonary nodules 4mm    Heperain drip, 6500 units IV push x1, 2L NS bolus, 40mg pantoprazole IV push x1, 20mg pepcid x1 given in the ED      Review of Systems:  Review of Systems: REVIEW OF SYSTEMS:   CONSTITUTIONAL: No weakness, fevers or chills  EYES/ENT: + dizziness, no visual changes, no headaches;  No vertigo or throat pain   NECK: No pain or stiffness  RESPIRATORY: No cough, wheezing, hemoptysis; +shortness of breath  CARDIOVASCULAR: + chest pressure, no chest pain or palpitations  GASTROINTESTINAL: No abdominal or epigastric pain. No nausea, vomiting, or hematemesis; No diarrhea or constipation. No melena or hematochezia.  GENITOURINARY: No dysuria, frequency or hematuria  NEUROLOGICAL: No numbness or weakness SKIN: No itching, rashes	  no melena no brbpr now with sob and some chest pain as well, no fever or chills    Allergies:  fish (Hives)  No Known Drug Allergies      Medications:  ALBUTerol    90 MICROgram(s) HFA Inhaler 2 Puff(s) Inhalation every 6 hours PRN  amiodarone    Tablet 200 milliGRAM(s) Oral daily  aspirin enteric coated 81 milliGRAM(s) Oral daily  atorvastatin 80 milliGRAM(s) Oral at bedtime  dextrose 40% Gel 15 Gram(s) Oral once PRN  dextrose 5%. 1000 milliLiter(s) IV Continuous <Continuous>  dextrose 50% Injectable 12.5 Gram(s) IV Push once  dextrose 50% Injectable 25 Gram(s) IV Push once  dextrose 50% Injectable 25 Gram(s) IV Push once  docusate sodium 100 milliGRAM(s) Oral three times a day  glucagon  Injectable 1 milliGRAM(s) IntraMuscular once PRN  heparin  Infusion.  Unit(s)/Hr IV Continuous <Continuous>  heparin  Injectable 6500 Unit(s) IV Push every 6 hours PRN  heparin  Injectable 3000 Unit(s) IV Push every 6 hours PRN  insulin glargine Injectable (LANTUS) 10 Unit(s) SubCutaneous at bedtime  insulin lispro (HumaLOG) corrective regimen sliding scale   SubCutaneous three times a day before meals  insulin lispro (HumaLOG) corrective regimen sliding scale   SubCutaneous at bedtime  insulin lispro Injectable (HumaLOG) 3 Unit(s) SubCutaneous three times a day before meals  nicotine - 21 mG/24Hr(s) Patch 1 patch Transdermal daily  pantoprazole    Tablet 40 milliGRAM(s) Oral two times a day  polyethylene glycol 3350 17 Gram(s) Oral at bedtime  senna 2 Tablet(s) Oral at bedtime  sucralfate 1 Gram(s) Oral two times a day  tamsulosin 0.4 milliGRAM(s) Oral at bedtime      PMHX/PSHX:  BPH (benign prostatic hyperplasia)  Hypertension  Afib  Diabetes mellitus with no complication  No pertinent past medical history  H/O abdominal surgery  No significant past surgical history      Family history:  No pertinent family history in first degree relatives      Social History: no etoh no cigs no ivda libves at home    ROS:     General:  No wt loss, fevers, chills, night sweats, fatigue,   Eyes:  Good vision, no reported pain  ENT:  No sore throat, pain, runny nose, dysphagia  CV:  No pain, palpitations, hypo/hypertension  Resp:  No dyspnea, cough, tachypnea, wheezing  GI:  No pain, No nausea, No vomiting, No diarrhea, No constipation, No weight loss, No fever, No pruritis, No rectal bleeding, No tarry stools, No dysphagia,  :  No pain, bleeding, incontinence, nocturia  Muscle:  No pain, weakness  Neuro:  No weakness, tingling, memory problems  Psych:  No fatigue, insomnia, mood problems, depression  Endocrine:  No polyuria, polydipsia, cold/heat intolerance  Heme:  No petechiae, ecchymosis, easy bruisability  Skin:  No rash, tattoos, scars, edema      PHYSICAL EXAM:   Vital Signs:  Vital Signs Last 24 Hrs  T(C): 37.4 (2019 16:14), Max: 37.4 (2019 16:14)  T(F): 99.3 (2019 16:14), Max: 99.3 (2019 16:14)  HR: 54 (2019 16:14) (48 - 57)  BP: 119/76 (2019 16:14) (117/72 - 131/74)  BP(mean): --  RR: 18 (2019 16:14) (18 - 189)  SpO2: 99% (2019 16:14) (93% - 99%)  Daily     Daily Weight in k.2 (2019 01:27)    GENERAL:  Appears stated age, well-groomed, well-nourished, no distress  HEENT:  NC/AT,  conjunctivae clear and pink, no thyromegaly, nodules, adenopathy, no JVD, sclera -anicteric  CHEST:  Full & symmetric excursion, no increased effort, breath sounds clear  HEART:  Regular rhythm, S1, S2, no murmur/rub/S3/S4, no abdominal bruit, no edema  ABDOMEN:  Soft, non-tender, non-distended, normoactive bowel sounds,  no masses ,no hepato-splenomegaly, no signs of chronic liver disease  EXTEREMITIES:  no cyanosis,clubbing or edema  SKIN:  No rash/erythema/ecchymoses/petechiae/wounds/abscess/warm/dry  NEURO:  Alert, oriented, no asterixis, no tremor, no encephalopathy    LABS:                        8.9    5.65  )-----------( 301      ( 2019 05:05 )             28.3     06-07    144  |  111<H>  |  15  ----------------------------<  143<H>  3.8   |  27  |  1.20    Ca    8.3<L>      2019 05:05  Phos  3.3     06-07  Mg     2.1     06-07    TPro  6.7  /  Alb  2.9<L>  /  TBili  0.2  /  DBili  x   /  AST  18  /  ALT  17  /  AlkPhos  118  06-05    LIVER FUNCTIONS - ( 2019 18:00 )  Alb: 2.9 g/dL / Pro: 6.7 g/dL / ALK PHOS: 118 U/L / ALT: 17 U/L / AST: 18 U/L / GGT: x           PT/INR - ( 2019 01:27 )   PT: 12.8 sec;   INR: 1.13 ratio         PTT - ( 2019 05:05 )  PTT:73.8 sec  Urinalysis Basic - ( 2019 11:16 )    Color: Yellow / Appearance: Clear / S.005 / pH: x  Gluc: x / Ketone: Negative  / Bili: Negative / Urobili: Negative   Blood: x / Protein: Negative / Nitrite: Negative   Leuk Esterase: Trace / RBC: Negative /HPF / WBC 3-5   Sq Epi: x / Non Sq Epi: Negative / Bacteria: Occasional          Imaging:
[facility]         Bolivar Carbajal, El, Brittany, Zahra, Reynaldo, Román        [phone] (office)    Reason for Consult: Chest pain    Interval HPI: Patient seen and examined at bedside. Patient complaining of chest pressure located in midsternal region. Reported pressure similar to at admission. Denies any palpitations, sob, or rodas. Patient admitted for management of PE overnight. Patient was recently discharged from [place] s/p omentopexy and plication for perforated gastric ulcer which course was complicated by new onset afib and NSTEMI.     HPI:  49 yo M PMH Afib (on amiodarone), BPH, DM2, hx of NSTEMI, osteomyelitis of left foot s/p toe amputation presenting to the ED for chest pressure that started this afternoon. Of note, patient was discharged from [facility] this afternoon, and shortly after returning home, while at rest, patient felt 8/10 chest pressure that was non radiating. There was a short episode of SOB and dizziness but it resolved shortly afterwards but the chest pressure continued. Patient denies headaches, chest pain, abdominal pain, n/v/d or LE pain.     In the ED:  vitals WNL  labs significant for Cr 1.5, d-dimer 660, rBNP 614  EKG- NSR rate 60  CTA- subsegmental right upper lobe PE. questionable right middle lobe subsegmental PE.  Scattered pulmonary nodules 4mm    Heperain drip, 6500 units IV push x1, 2L NS bolus, 40mg pantoprazole IV push x1, 20mg pepcid x1 given in the ED       PAST MEDICAL & SURGICAL HISTORY:  BPH (benign prostatic hyperplasia)  Hypertension  Afib  Diabetes mellitus with no complication  H/O abdominal surgery  NSTEMI      SOCIAL HISTORY: No active tobacco, alcohol or illicit drug use    FAMILY HISTORY:  No pertinent family history in first degree relatives      Home Medications:  aluminum hydroxide-magnesium hydroxide 200 mg-200 mg/5 mL oral suspension: 30 milliliter(s) orally every 4 hours, As needed, Dyspepsia (06 Jun 2019 02:22)  nitroglycerin: 0.5 milligram(s)  , every five minutes As Needed for chest pain (06 Jun 2019 02:22)  polyethylene glycol 3350 oral powder for reconstitution: 17 gram(s) orally once a day (at bedtime) (06 Jun 2019 02:22)  Protonix 40 mg oral delayed release tablet: 1 tab(s) orally 2 times a day (06 Jun 2019 02:22)      MEDICATIONS  (STANDING):  amiodarone    Tablet 200 milliGRAM(s) Oral daily  atorvastatin 80 milliGRAM(s) Oral at bedtime  dextrose 5%. 1000 milliLiter(s) (50 mL/Hr) IV Continuous <Continuous>  dextrose 50% Injectable 12.5 Gram(s) IV Push once  dextrose 50% Injectable 25 Gram(s) IV Push once  dextrose 50% Injectable 25 Gram(s) IV Push once  docusate sodium 100 milliGRAM(s) Oral three times a day  heparin  Infusion.  Unit(s)/Hr (15 mL/Hr) IV Continuous <Continuous>  insulin glargine Injectable (LANTUS) 10 Unit(s) SubCutaneous at bedtime  insulin lispro (HumaLOG) corrective regimen sliding scale   SubCutaneous three times a day before meals  insulin lispro (HumaLOG) corrective regimen sliding scale   SubCutaneous at bedtime  insulin lispro Injectable (HumaLOG) 3 Unit(s) SubCutaneous three times a day before meals  nicotine - 21 mG/24Hr(s) Patch 1 patch Transdermal daily  pantoprazole    Tablet 40 milliGRAM(s) Oral two times a day  polyethylene glycol 3350 17 Gram(s) Oral at bedtime  senna 2 Tablet(s) Oral at bedtime  tamsulosin 0.4 milliGRAM(s) Oral at bedtime    MEDICATIONS  (PRN):  ALBUTerol    90 MICROgram(s) HFA Inhaler 2 Puff(s) Inhalation every 6 hours PRN Shortness of Breath and/or Wheezing  dextrose 40% Gel 15 Gram(s) Oral once PRN Blood Glucose LESS THAN 70 milliGRAM(s)/deciliter  glucagon  Injectable 1 milliGRAM(s) IntraMuscular once PRN Glucose LESS THAN 70 milligrams/deciliter  heparin  Injectable 6500 Unit(s) IV Push every 6 hours PRN For aPTT less than 40  heparin  Injectable 3000 Unit(s) IV Push every 6 hours PRN For aPTT between 40 - 57  nitroglycerin     SubLingual 0.4 milliGRAM(s) SubLingual every 5 minutes PRN Chest Pain      Allergies    fish (Hives)  No Known Drug Allergies    Intolerances        REVIEW OF SYSTEMS: Negative except as per HPI.    VITAL SIGNS:   Vital Signs Last 24 Hrs  T(C): 36.9 (06 Jun 2019 10:17), Max: 38.2 (06 Jun 2019 03:39)  T(F): 98.4 (06 Jun 2019 10:17), Max: 100.7 (06 Jun 2019 03:39)  HR: 57 (06 Jun 2019 10:17) (55 - 90)  BP: 112/65 (06 Jun 2019 10:17) (112/65 - 160/75)  BP(mean): 81 (06 Jun 2019 03:12) (81 - 94)  RR: 16 (06 Jun 2019 10:17) (16 - 18)  SpO2: 96% (06 Jun 2019 10:17) (94% - 97%)    I&O's Summary    05 Jun 2019 07:01  -  06 Jun 2019 07:00  --------------------------------------------------------  IN: 570 mL / OUT: 0 mL / NET: 570 mL    06 Jun 2019 07:01  -  06 Jun 2019 10:56  --------------------------------------------------------  IN: 390 mL / OUT: 0 mL / NET: 390 mL        PHYSICAL EXAM:  Constitutional: NAD, well-developed, lying in bed comfortable, speaking full sentences  HEENT NC/AT, moist mucous membranes  Pulmonary: Non-labored, breath sounds are clear bilaterally, no wheezing, rales or rhonchi  Cardiovascular: +S1, S2, bradycardic, no murmur  Gastrointestinal: Soft, nontender, nondistended, normoactive bowel sounds  Extremities: 1+ b/l LE edema, left foot dressing c/d/i   Neurological: Alert and oriented x3, no focal deficits   Skin: No visible rashes   Psych: Mood & affect appropriate    LABS: All Labs Reviewed:                        8.4    6.71  )-----------( 306      ( 06 Jun 2019 07:54 )             26.7                         8.7    6.93  )-----------( 320      ( 06 Jun 2019 05:05 )             27.5                         9.9    8.34  )-----------( 366      ( 05 Jun 2019 18:00 )             31.4     06 Jun 2019 05:05    144    |  111    |  15     ----------------------------<  193    3.7     |  26     |  1.30   05 Jun 2019 18:00    137    |  103    |  16     ----------------------------<  196    4.5     |  27     |  1.50   05 Jun 2019 05:28    142    |  107    |  16     ----------------------------<  110    3.9     |  29     |  1.30     Ca    7.9        06 Jun 2019 05:05  Ca    8.3        05 Jun 2019 18:00  Ca    8.1        05 Jun 2019 05:28    TPro  6.7    /  Alb  2.9    /  TBili  0.2    /  DBili  x      /  AST  18     /  ALT  17     /  AlkPhos  118    05 Jun 2019 18:00    PT/INR - ( 06 Jun 2019 01:27 )   PT: 12.8 sec;   INR: 1.13 ratio         PTT - ( 06 Jun 2019 07:54 )  PTT:63.1 sec  CARDIAC MARKERS ( 05 Jun 2019 23:10 )  .019 ng/mL / x     / x     / x     / x      CARDIAC MARKERS ( 05 Jun 2019 18:00 )  .017 ng/mL / x     / x     / x     / x          Blood Culture:   06-05 @ 18:00  Pro Bnp 614      EKG:  most recent showed sinus johnny at 54bpm    RADIOLOGY:  < from: CT Angio Chest w/ IV Cont (06.06.19 @ 00:07) >  EXAM:  CT ANGIO CHEST (W)AW IC                          PROCEDURE DATE:  06/06/2019        INTERPRETATION:  CLINICAL INFORMATION: Chest pain.    COMPARISON: None.    PROCEDURE:   CT Angiography of the Chest.  95 ml of Omnipaque 350 was injectedintravenously. 5 ml were discarded.  Sagittal and coronal reformats were performed as well as 3D (MIP)   reconstructions.    FINDINGS:    LUNGS AND AIRWAYS: Patent central airways.  Paraseptal and centrilobular   emphysema. Few scattered small pulmonary nodules up to 4 mm within the   right lower lobe along the major fissure (series 3, image 61). Bilateral   lower lobe subsegmental atelectasis.    PLEURA: Trace left pleural effusion.    MEDIASTINUM AND ANAYA: No lymphadenopathy.    VESSELS: Adequate opacification the pulmonary arteries. Subsegmental   right upper lobe filling defects consistent with pulmonary emboli.   Question subsegmental right middle lobe pulmonary embolus.    HEART: Heart size is normal. No pericardial effusion. Coronaryartery   calcification.    CHEST WALL AND LOWER NECK: Mild bilateral gynecomastia.     VISUALIZED UPPER ABDOMEN: Midline postsurgical change within the upper   abdominal wall.    BONES: Chronic fracture deformity of the left posterolateral eighth and  ninth ribs. Multilevel degenerative changes of the spine.    IMPRESSION:     Subsegmental right upper lobe pulmonary emboli. Question right middle   lobe subsegmental pulmonary embolus.    Emphysema.    Few scattered pulmonary nodules measuring up to 4 mm. 1 year interval   noncontrast chest CT may be obtained to demonstrate stability.    Dr. Flores discussed the above findings with Dr. Sauceda at 1:04 AM on   6/6/2019 with read back.    ABHISHEK FLORES M.D., ATTENDING RADIOLOGIST  This document has been electronically signed. Jun 6 2019  1:09AM    < end of copied text >      CXR: no active infiltrates
47yo male seen consulted on behalf of Dr. Hale who is his post-op status post left hallux amputation and partial 1st metatarsal resection from 5/28/19. Admitted for PE    Left foot dressing clean dry and intact  sutures intact, no erythema, mild midfoot edema  mild dehiscence mid-line
Date/Time Patient Seen:  		  Referring MD:   Data Reviewed	       Patient is a 48y old  Male who presents with a chief complaint of PE (06 Jun 2019 02:10)      Subjective/HPI  in bed, seen and examined, vs and meds reviewed, labs reviewed, H and P reviewed, CT chest reviewed,   US dopplers pending  on room air  no resp distress on exam      H&P Adult [Charted Location: [facility] TELN 335 W1] [Authored: 06-Jun-2019 02:10]- for Visit: [id number], Incomplete, Revised, Signed w/additional Signatures Pending, General    History and Physical:   Source of Information	Patient  Outpatient Providers	PMD: Dr. Linette Rico     Language:  · Patient/Family of Limited English Proficiency	No       History of Present Illness:  Reason for Admission: PE  History of Present Illness:   49 yo M PMH Afib (on amiodarone), BPH, DM2, hx of NSTEMI, osteomyelitis of left foot s/p toe amputation presenting to the ED for chest pressure that started this afternoon. Of note, patient was discharged from [facility] this afternoon, and shortly after returning home, while at rest, patient felt 8/10 chest pressure that was non radiating. There was a short episode of SOB and dizziness but it resolved shortly afterwards but the chest pressure continued. Patient denies headaches, chest pain, abdominal pain, n/v/d or LE pain.     In the ED:  vitals WNL  labs significant for Cr 1.5, d-dimer 660, rBNP 614  EKG- NSR rate 60  CTA- subsegmental right upper lobe PE. questionable right middle lobe subsegmental PE.  Scattered pulmonary nodules 4mm    heparin drip, 6500 units IV push x1, 2L NS bolus, 40mg pantoprazole IV push x1, 20mg pepcid x1 given in the ED         PAST MEDICAL & SURGICAL HISTORY:  BPH (benign prostatic hyperplasia)  Hypertension  Afib  Diabetes mellitus with no complication  No pertinent past medical history  H/O abdominal surgery  No significant past surgical history        Medication list         MEDICATIONS  (STANDING):  amiodarone    Tablet 200 milliGRAM(s) Oral daily  dextrose 5%. 1000 milliLiter(s) (50 mL/Hr) IV Continuous <Continuous>  dextrose 50% Injectable 12.5 Gram(s) IV Push once  dextrose 50% Injectable 25 Gram(s) IV Push once  dextrose 50% Injectable 25 Gram(s) IV Push once  docusate sodium 100 milliGRAM(s) Oral three times a day  heparin  Infusion.  Unit(s)/Hr (15 mL/Hr) IV Continuous <Continuous>  insulin glargine Injectable (LANTUS) 10 Unit(s) SubCutaneous at bedtime  insulin lispro (HumaLOG) corrective regimen sliding scale   SubCutaneous three times a day before meals  insulin lispro (HumaLOG) corrective regimen sliding scale   SubCutaneous at bedtime  insulin lispro Injectable (HumaLOG) 3 Unit(s) SubCutaneous three times a day before meals  nicotine - 21 mG/24Hr(s) Patch 1 patch Transdermal daily  pantoprazole    Tablet 40 milliGRAM(s) Oral two times a day  polyethylene glycol 3350 17 Gram(s) Oral at bedtime  senna 2 Tablet(s) Oral at bedtime  tamsulosin 0.4 milliGRAM(s) Oral at bedtime    MEDICATIONS  (PRN):  dextrose 40% Gel 15 Gram(s) Oral once PRN Blood Glucose LESS THAN 70 milliGRAM(s)/deciliter  glucagon  Injectable 1 milliGRAM(s) IntraMuscular once PRN Glucose LESS THAN 70 milligrams/deciliter  heparin  Injectable 6500 Unit(s) IV Push every 6 hours PRN For aPTT less than 40  heparin  Injectable 3000 Unit(s) IV Push every 6 hours PRN For aPTT between 40 - 57         Vitals log        ICU Vital Signs Last 24 Hrs  T(C): 36.8 (06 Jun 2019 05:27), Max: 38.2 (06 Jun 2019 03:39)  T(F): 98.2 (06 Jun 2019 05:27), Max: 100.7 (06 Jun 2019 03:39)  HR: 78 (06 Jun 2019 05:27) (56 - 90)  BP: 115/69 (06 Jun 2019 05:27) (115/69 - 160/75)  BP(mean): 81 (06 Jun 2019 03:12) (81 - 94)  ABP: --  ABP(mean): --  RR: 18 (06 Jun 2019 03:39) (16 - 18)  SpO2: 94% (06 Jun 2019 03:39) (94% - 97%)           Input and Output:  I&O's Detail    05 Jun 2019 07:01  -  06 Jun 2019 07:00  --------------------------------------------------------  IN:    heparin  Infusion.: 90 mL    Oral Fluid: 480 mL  Total IN: 570 mL    OUT:  Total OUT: 0 mL    Total NET: 570 mL          Lab Data                        8.7    6.93  )-----------( 320      ( 06 Jun 2019 05:05 )             27.5     06-06    144  |  111<H>  |  15  ----------------------------<  193<H>  3.7   |  26  |  1.30    Ca    7.9<L>      06 Jun 2019 05:05    TPro  6.7  /  Alb  2.9<L>  /  TBili  0.2  /  DBili  x   /  AST  18  /  ALT  17  /  AlkPhos  118  06-05      CARDIAC MARKERS ( 05 Jun 2019 23:10 )  .019 ng/mL / x     / x     / x     / x      CARDIAC MARKERS ( 05 Jun 2019 18:00 )  .017 ng/mL / x     / x     / x     / x        denies tob use    Review of Systems	  sob  rodas      Objective     Physical Examination    head at  heart s1s2  lung dec BS  on room air  verbal  alert  chest symmetric      Pertinent Lab findings & Imaging      [place]:  NO   Adequate UO     I&O's Detail    05 Jun 2019 07:01  -  06 Jun 2019 07:00  --------------------------------------------------------  IN:    heparin  Infusion.: 90 mL    Oral Fluid: 480 mL  Total IN: 570 mL    OUT:  Total OUT: 0 mL    Total NET: 570 mL               Discussed with:     Cultures:	        Radiology        EXAM: CT ANGIO CHEST (W)AW IC       PROCEDURE DATE: 06/06/2019         INTERPRETATION: CLINICAL INFORMATION: Chest pain.     COMPARISON: None.     PROCEDURE:   CT Angiography of the Chest.   95 ml of Omnipaque 350 was injected intravenously. 5 ml were discarded.   Sagittal and coronal reformats were performed as well as 3D (MIP)   reconstructions.       FINDINGS:     LUNGS AND AIRWAYS: Patent central airways. Paraseptal and centrilobular   emphysema. Few scattered small pulmonary nodules up to 4 mm within the right   lower lobe along the major fissure (series 3, image 61). Bilateral lower   lobe subsegmental atelectasis.     PLEURA: Trace left pleural effusion.     MEDIASTINUM AND ANAYA: No lymphadenopathy.     VESSELS: Adequate opacification the pulmonary arteries. Subsegmental right   upper lobe filling defects consistent with pulmonary emboli. Question   subsegmental right middle lobe pulmonary embolus.     HEART: Heart size is normal. No pericardial effusion. Coronary artery   calcification.     CHEST WALL AND LOWER NECK: Mild bilateral gynecomastia.     VISUALIZED UPPER ABDOMEN: Midline postsurgical change within the upper   abdominal wall.     BONES: Chronic fracture deformity of the left posterolateral eighth and   ninth ribs. Multilevel degenerative changes of the spine.     IMPRESSION:     Subsegmental right upper lobe pulmonary emboli. Question right middle lobe   subsegmental pulmonary embolus.     Emphysema.     Few scattered pulmonary nodules measuring up to 4 mm. 1 year interval   noncontrast chest CT may be obtained to demonstrate stability.     Dr. Flores discussed the above findings with Dr. Sauceda at 1:04 AM on   6/6/2019 with read back.                       ABHISHEK FLORES M.D., ATTENDING RADIOLOGIST   This document has been electronically signed. Jun 6 2019 1:09AM

## 2019-06-08 NOTE — PROGRESS NOTE ADULT - PROBLEM SELECTOR PLAN 1
CTA- subsegmental right upper lobe PE, questionable right middle lobe subsegmental PE.  - Mild to moderate Right sided CP reproduced upon palpation of the upper right area of the chest. Possibly musculoskeletal/costochondritis vs pleuritic pain from PE  - NSAIDs contraindicated in the setting of recent perforated gastric ulcer   - Cardio consulted  - Serial CE and EKGs negative   - s/p Heparin 6500units IV push x1 in the ED  - continue 25,000units heparin infusion at 15cc/hr  - LE dopplers negative

## 2019-06-08 NOTE — PROGRESS NOTE ADULT - SUBJECTIVE AND OBJECTIVE BOX
Date/Time Patient Seen:  		  Referring MD:   Data Reviewed	       Patient is a 48y old  Male who presents with a chief complaint of PE (08 Jun 2019 08:58)      Subjective/HPI     PAST MEDICAL & SURGICAL HISTORY:  BPH (benign prostatic hyperplasia)  Hypertension  Afib  Diabetes mellitus with no complication  No pertinent past medical history  H/O abdominal surgery  No significant past surgical history        Medication list         MEDICATIONS  (STANDING):  amiodarone    Tablet 200 milliGRAM(s) Oral daily  aspirin enteric coated 81 milliGRAM(s) Oral daily  atorvastatin 80 milliGRAM(s) Oral at bedtime  dextrose 5%. 1000 milliLiter(s) (50 mL/Hr) IV Continuous <Continuous>  dextrose 50% Injectable 12.5 Gram(s) IV Push once  dextrose 50% Injectable 25 Gram(s) IV Push once  dextrose 50% Injectable 25 Gram(s) IV Push once  docusate sodium 100 milliGRAM(s) Oral three times a day  heparin  Infusion.  Unit(s)/Hr (15 mL/Hr) IV Continuous <Continuous>  insulin glargine Injectable (LANTUS) 10 Unit(s) SubCutaneous at bedtime  insulin lispro (HumaLOG) corrective regimen sliding scale   SubCutaneous three times a day before meals  insulin lispro (HumaLOG) corrective regimen sliding scale   SubCutaneous at bedtime  insulin lispro Injectable (HumaLOG) 3 Unit(s) SubCutaneous three times a day before meals  nicotine - 21 mG/24Hr(s) Patch 1 patch Transdermal daily  pantoprazole    Tablet 40 milliGRAM(s) Oral two times a day  polyethylene glycol 3350 17 Gram(s) Oral at bedtime  senna 2 Tablet(s) Oral at bedtime  sucralfate 1 Gram(s) Oral two times a day  tamsulosin 0.4 milliGRAM(s) Oral at bedtime    MEDICATIONS  (PRN):  ALBUTerol    90 MICROgram(s) HFA Inhaler 2 Puff(s) Inhalation every 6 hours PRN Shortness of Breath and/or Wheezing  dextrose 40% Gel 15 Gram(s) Oral once PRN Blood Glucose LESS THAN 70 milliGRAM(s)/deciliter  glucagon  Injectable 1 milliGRAM(s) IntraMuscular once PRN Glucose LESS THAN 70 milligrams/deciliter  heparin  Injectable 6500 Unit(s) IV Push every 6 hours PRN For aPTT less than 40  heparin  Injectable 3000 Unit(s) IV Push every 6 hours PRN For aPTT between 40 - 57         Vitals log        ICU Vital Signs Last 24 Hrs  T(C): 36.9 (08 Jun 2019 11:47), Max: 37.4 (07 Jun 2019 16:14)  T(F): 98.5 (08 Jun 2019 11:47), Max: 99.3 (07 Jun 2019 16:14)  HR: 55 (08 Jun 2019 11:47) (49 - 55)  BP: 99/58 (08 Jun 2019 11:47) (99/58 - 130/80)  BP(mean): --  ABP: --  ABP(mean): --  RR: 20 (08 Jun 2019 11:47) (18 - 21)  SpO2: 95% (08 Jun 2019 11:47) (95% - 100%)           Input and Output:  I&O's Detail    07 Jun 2019 07:01  -  08 Jun 2019 07:00  --------------------------------------------------------  IN:    heparin  Infusion.: 105 mL    Oral Fluid: 480 mL  Total IN: 585 mL    OUT:  Total OUT: 0 mL    Total NET: 585 mL      08 Jun 2019 07:01  -  08 Jun 2019 12:03  --------------------------------------------------------  IN:    Oral Fluid: 360 mL  Total IN: 360 mL    OUT:  Total OUT: 0 mL    Total NET: 360 mL          Lab Data                        9.3    5.84  )-----------( 300      ( 08 Jun 2019 08:10 )             29.9     06-08    144  |  110<H>  |  12  ----------------------------<  97  4.0   |  27  |  1.10    Ca    8.6      08 Jun 2019 08:10  Phos  3.3     06-07  Mg     2.1     06-07        CARDIAC MARKERS ( 07 Jun 2019 07:54 )  .025 ng/mL / x     / 64 U/L / x     / 3.0 ng/mL  CARDIAC MARKERS ( 06 Jun 2019 23:36 )  .022 ng/mL / x     / 75 U/L / x     / 3.5 ng/mL  CARDIAC MARKERS ( 06 Jun 2019 17:10 )  .021 ng/mL / x     / 81 U/L / x     / 4.1 ng/mL        Review of Systems	      Objective     Physical Examination    heart s1s2  lung dec bs  chest symmetric  on room air  seen and examined      Pertinent Lab findings & Imaging      Ale:  NO   Adequate UO     I&O's Detail    07 Jun 2019 07:01  -  08 Jun 2019 07:00  --------------------------------------------------------  IN:    heparin  Infusion.: 105 mL    Oral Fluid: 480 mL  Total IN: 585 mL    OUT:  Total OUT: 0 mL    Total NET: 585 mL      08 Jun 2019 07:01  -  08 Jun 2019 12:03  --------------------------------------------------------  IN:    Oral Fluid: 360 mL  Total IN: 360 mL    OUT:  Total OUT: 0 mL    Total NET: 360 mL               Discussed with:     Cultures:	        Radiology

## 2019-06-08 NOTE — PROGRESS NOTE ADULT - SUBJECTIVE AND OBJECTIVE BOX
[facility] -- Bolivar Carbajal, Brittany Gallegos, Reynaldo Sweeney Savella  Office # [phone]      Follow Up:    PE, Recent NSTEMI  Subjective/Observations:   No events overnight resting comfortably in bed.  No complaints of chest pain, dyspnea, or palpitations reported. No signs of orthopnea or PND.     REVIEW OF SYSTEMS: All other review of systems is negative unless indicated above    PAST MEDICAL & SURGICAL HISTORY:  BPH (benign prostatic hyperplasia)  Hypertension  Afib  Diabetes mellitus with no complication  H/O abdominal surgery      MEDICATIONS  (STANDING):  amiodarone    Tablet 200 milliGRAM(s) Oral daily  aspirin enteric coated 81 milliGRAM(s) Oral daily  atorvastatin 80 milliGRAM(s) Oral at bedtime  dextrose 5%. 1000 milliLiter(s) (50 mL/Hr) IV Continuous <Continuous>  dextrose 50% Injectable 12.5 Gram(s) IV Push once  dextrose 50% Injectable 25 Gram(s) IV Push once  dextrose 50% Injectable 25 Gram(s) IV Push once  docusate sodium 100 milliGRAM(s) Oral three times a day  heparin  Infusion.  Unit(s)/Hr (15 mL/Hr) IV Continuous <Continuous>  insulin glargine Injectable (LANTUS) 10 Unit(s) SubCutaneous at bedtime  insulin lispro (HumaLOG) corrective regimen sliding scale   SubCutaneous three times a day before meals  insulin lispro (HumaLOG) corrective regimen sliding scale   SubCutaneous at bedtime  insulin lispro Injectable (HumaLOG) 3 Unit(s) SubCutaneous three times a day before meals  nicotine - 21 mG/24Hr(s) Patch 1 patch Transdermal daily  pantoprazole    Tablet 40 milliGRAM(s) Oral two times a day  polyethylene glycol 3350 17 Gram(s) Oral at bedtime  senna 2 Tablet(s) Oral at bedtime  sucralfate 1 Gram(s) Oral two times a day  tamsulosin 0.4 milliGRAM(s) Oral at bedtime    MEDICATIONS  (PRN):  ALBUTerol    90 MICROgram(s) HFA Inhaler 2 Puff(s) Inhalation every 6 hours PRN Shortness of Breath and/or Wheezing  dextrose 40% Gel 15 Gram(s) Oral once PRN Blood Glucose LESS THAN 70 milliGRAM(s)/deciliter  glucagon  Injectable 1 milliGRAM(s) IntraMuscular once PRN Glucose LESS THAN 70 milligrams/deciliter  heparin  Injectable 6500 Unit(s) IV Push every 6 hours PRN For aPTT less than 40  heparin  Injectable 3000 Unit(s) IV Push every 6 hours PRN For aPTT between 40 - 57      Allergies    fish (Hives)  No Known Drug Allergies    Intolerances        Vital Signs Last 24 Hrs  T(C): 36.9 (08 Jun 2019 11:47), Max: 37.4 (07 Jun 2019 16:14)  T(F): 98.5 (08 Jun 2019 11:47), Max: 99.3 (07 Jun 2019 16:14)  HR: 55 (08 Jun 2019 11:47) (49 - 55)  BP: 99/58 (08 Jun 2019 11:47) (99/58 - 130/80)  BP(mean): --  RR: 20 (08 Jun 2019 11:47) (18 - 21)  SpO2: 95% (08 Jun 2019 11:47) (95% - 100%)    I&O's Summary    07 Jun 2019 07:01  -  08 Jun 2019 07:00  --------------------------------------------------------  IN: 585 mL / OUT: 0 mL / NET: 585 mL    08 Jun 2019 07:01  -  08 Jun 2019 12:40  --------------------------------------------------------  IN: 360 mL / OUT: 0 mL / NET: 360 mL          PHYSICAL EXAM:  TELE:   Constitutional: NAD, awake and alert, well-developed  HEENT: Moist Mucous Membranes, Anicteric  Pulmonary: Non-labored, breath sounds with crackles bilaterally at bases , No wheezing, or rhonchi   Cardiovascular: Regular, S1 and S2 nl, No murmurs, rubs, gallops or clicks  Gastrointestinal: Bowel Sounds present, soft, nontender.   Lymph: No lymphadenopathy. + peripheral edema.  Skin: No visible rashes or ulcers.  Psych:  Mood & affect appropriate    LABS: All Labs Reviewed:                        9.3    5.84  )-----------( 300      ( 08 Jun 2019 08:10 )             29.9                         8.9    5.65  )-----------( 301      ( 07 Jun 2019 05:05 )             28.3                         8.4    6.71  )-----------( 306      ( 06 Jun 2019 07:54 )             26.7     08 Jun 2019 08:10    144    |  110    |  12     ----------------------------<  97     4.0     |  27     |  1.10   07 Jun 2019 05:05    144    |  111    |  15     ----------------------------<  143    3.8     |  27     |  1.20   06 Jun 2019 05:05    144    |  111    |  15     ----------------------------<  193    3.7     |  26     |  1.30     Ca    8.6        08 Jun 2019 08:10  Ca    8.3        07 Jun 2019 05:05  Ca    7.9        06 Jun 2019 05:05  Phos  3.3       07 Jun 2019 05:05  Mg     2.1       07 Jun 2019 05:05    TPro  6.7    /  Alb  2.9    /  TBili  0.2    /  DBili  x      /  AST  18     /  ALT  17     /  AlkPhos  118    05 Jun 2019 18:00    PTT - ( 08 Jun 2019 08:10 )  PTT:88.2 sec  CARDIAC MARKERS ( 07 Jun 2019 07:54 )  .025 ng/mL / x     / 64 U/L / x     / 3.0 ng/mL  CARDIAC MARKERS ( 06 Jun 2019 23:36 )  .022 ng/mL / x     / 75 U/L / x     / 3.5 ng/mL  CARDIAC MARKERS ( 06 Jun 2019 17:10 )  .021 ng/mL / x     / 81 U/L / x     / 4.1 ng/mL     from: TTE Echo Doppler w/o Cont (05.16.19 @ 09:45) >     EXAM:  ECHO TTE WO CON COMP W DOPPLR         PROCEDURE DATE:  05/16/2019        INTERPRETATION:  INDICATION: Elevated troponin    Blood Pressure 137/62    Height 187.96 cm     Weight 81.6 kg       BSA   2.1 sq m    Dimensions:    LA 4.1       Normal Values: 2.0 - 4.0 cm    Ao 3.6        Normal Values: 2.0 - 3.8 cm  SEPTUM 1.3       Normal Values: 0.6 - 1.2 cm  PWT 1.3       Normal Values: 0.6 - 1.1 cm  LVIDd 6.0         Normal Values: 3.0 - 5.6 cm  LVIDs 3.1         Normal Values: 1.8 - 4.0 cm      OBSERVATIONS:    Mitral Valve: Thickened leaflets, moderate MR.  Aortic Valve/Aorta: Sclerotic trileaflet aortic valve with normal   opening. Trace AI  Tricuspid Valve: normal with mild TR.  Pulmonic Valve: normal  Left Atrium: Mildly dilated  Right Atrium: normal  Left Ventricle: normal LV size and systolic function, estimated LVEF of   65%. No evidence of segmental dysfunction. Basal septal hypertrophy is   present  Right Ventricle: normal size and systolic function.  Pericardium/Pleura: normal, no significant pericardial effusion.    Conclusion:     normal LV size and systolic function, estimated LVEF of 65%. No evidence   of segmental dysfunction  Normal RV size and systolic function.   Mild left atrial enlargement  Sclerotic trileaflet aortic valve with normal opening. Trace AI  Mitral valve with thickened leaflets and moderate MR  Mild TR  No significant pericardial effusion.     MIROSLAVA CASTAÑEDA   This document has been electronically signed. May 17 2019  7:45AM     < end of copied text >    < from: CT Angio Chest w/ IV Cont (06.06.19 @ 00:07) >    EXAM:  CT ANGIO CHEST (W)AW IC                            PROCEDURE DATE:  06/06/2019          INTERPRETATION:  CLINICAL INFORMATION: Chest pain.    COMPARISON: None.    PROCEDURE:   CT Angiography of the Chest.  95 ml of Omnipaque 350 was injectedintravenously. 5 ml were discarded.  Sagittal and coronal reformats were performed as well as 3D (MIP)   reconstructions.      FINDINGS:    LUNGS AND AIRWAYS: Patent central airways.  Paraseptal and centrilobular   emphysema. Few scattered small pulmonary nodules up to 4 mm within the   right lower lobe along the major fissure (series 3, image 61). Bilateral   lower lobe subsegmental atelectasis.    PLEURA: Trace left pleural effusion.    MEDIASTINUM AND ANAYA: No lymphadenopathy.    VESSELS: Adequate opacification the pulmonary arteries. Subsegmental   right upper lobe filling defects consistent with pulmonary emboli.   Question subsegmental right middle lobe pulmonary embolus.    HEART: Heart size is normal. No pericardial effusion. Coronaryartery   calcification.    CHEST WALL AND LOWER NECK: Mild bilateral gynecomastia.     VISUALIZED UPPER ABDOMEN: Midline postsurgical change within the upper   abdominal wall.    BONES: Chronic fracture deformity of the left posterolateral eighth and  ninth ribs. Multilevel degenerative changes of the spine.    IMPRESSION:     Subsegmental right upper lobe pulmonary emboli. Question right middle   lobe subsegmental pulmonary embolus.    Emphysema.    Few scattered pulmonary nodules measuring up to 4 mm. 1 year interval   noncontrast chest CT may be obtained to demonstrate stability.    Dr. Flores discussed the above findings with Dr. Sauceda at 1:04 AM on   6/6/2019 with read back.    ABHISHEK FLORES M.D., ATTENDING RADIOLOGIST  This document has been electronically signed. Jun 6 2019  1:09AM      < end of copied text >    < from: US Duplex Venous Lower Ext Complete, Bilateral (06.06.19 @ 09:30) >    EXAM:  US DPLX LWR EXT VEINS COMPL BI                          PROCEDURE DATE:  06/06/2019      INTERPRETATION:  CLINICAL INFORMATION: Bilateral leg swelling    COMPARISON: None available.    TECHNIQUE: Duplex sonography of the BILATERAL LOWER extremities with   color and spectral Doppler, with and without compression.      FINDINGS:    There is normal compressibility of the bilateral common femoral, femoral   and popliteal veins.     Doppler examination shows normal spontaneous and phasic flow.    No calf vein thrombosis is detected.    IMPRESSION:     No evidence of bilateral lower extremity deep venous thrombosis.    WENDI HANSON M.D., ATTENDING RADIOLOGIST  This document has been electronically signed. Jun 6 2019  9:39AM      < end of copied text >       Ed Paula [facility]   Cardiology

## 2019-06-08 NOTE — PROGRESS NOTE ADULT - PROBLEM SELECTOR PLAN 2
patient was recently discharged from hospital for perforated ulcer  stable  continue 40mg pantoprazole BID  monitor hgb   risk/benefit of starting heparin drip with recent perforation of ulcer, treat PE monitor for signs of bleeding  - check FOBT

## 2019-06-08 NOTE — PROGRESS NOTE ADULT - ASSESSMENT
48 year old male with PMH of afib, NSTEMI, osteomyelitis,  HTN and DM2, who presented to ED with chest pressure, admitted for management of PE. Patient was recently was admitted to [facility] for perforated Antral Gastric Ulcer, s/p Emergent Exp-lap Oomentopexy and plication 5/11. Found to have new onset afib with elevated troponin post-operatively indicative of NSTEMI, started on Amiodarone and converted to sinus rhythm, left foot osteo s/p debridement OR 5/28    Cardio consulted for chest pain after admission     PE (RUL subsegmental)  -C/W heparin gtt   -Target aPTT = 58 - 99 seconds.   -  can switch to NOAC  Will defer to Pulm  - Venous USD negative.  - Monitor for s/s bleeding  - Continue to monitor on tele    Afib  - He went into Afib post op last admission requiring Amio load  - It was short-lived that it was decided he did not need long term AC, with plans to do an outpatient event monitor  - He remained in NSR throughout his stay prior to discharge  - HE has had SB to 40s on monitor we will decrease his Amiodarone to 100 mg daily   - No further episodes of f NSVT   - Monitor electrolytes, replete to keep K>4 and Mag>2     NSTEMI vs demand ischemia  - He had elevated troponins post op from his recent admission, though all his CPK's were negative.  Plan was outpatient ischemic eval  - TTE then showed EF 65% with no segmental wall motion abnormality  - EKG unchanged from admission, no acute ST changes.  His cardiac enzymes this admission are all negative  -  continue 81 mg daily  - Continue statin  - hold ACEI    Edema  - b/l pitting LE edema L>R is chronic.  He has no evidence of volume overload apart from his edema.  Pro-BNP = 614  - Will not diurese at this point    HTN  - BP well controlled, not on any BP medications  - monitor vitals closely, can continue amiodarone, atorvastatin    DM2  - Per Primary    - Other cardiovascular workup will depend on clinical course. All other workup per primary team.  - Will follow.    Ed Paula ANP  Cardiology

## 2019-06-09 LAB
ANION GAP SERPL CALC-SCNC: 7 MMOL/L — SIGNIFICANT CHANGE UP (ref 5–17)
APTT BLD: 94.3 SEC — HIGH (ref 27.5–36.3)
BUN SERPL-MCNC: 13 MG/DL — SIGNIFICANT CHANGE UP (ref 7–23)
CALCIUM SERPL-MCNC: 8.6 MG/DL — SIGNIFICANT CHANGE UP (ref 8.5–10.1)
CHLORIDE SERPL-SCNC: 110 MMOL/L — HIGH (ref 96–108)
CO2 SERPL-SCNC: 27 MMOL/L — SIGNIFICANT CHANGE UP (ref 22–31)
CREAT SERPL-MCNC: 1.2 MG/DL — SIGNIFICANT CHANGE UP (ref 0.5–1.3)
GLUCOSE SERPL-MCNC: 116 MG/DL — HIGH (ref 70–99)
HCT VFR BLD CALC: 31 % — LOW (ref 39–50)
HGB BLD-MCNC: 9.9 G/DL — LOW (ref 13–17)
INR BLD: 1.1 RATIO — SIGNIFICANT CHANGE UP (ref 0.88–1.16)
MAGNESIUM SERPL-MCNC: 2.2 MG/DL — SIGNIFICANT CHANGE UP (ref 1.6–2.6)
MCHC RBC-ENTMCNC: 31.2 PG — SIGNIFICANT CHANGE UP (ref 27–34)
MCHC RBC-ENTMCNC: 31.9 GM/DL — LOW (ref 32–36)
MCV RBC AUTO: 97.8 FL — SIGNIFICANT CHANGE UP (ref 80–100)
NRBC # BLD: 0 /100 WBCS — SIGNIFICANT CHANGE UP (ref 0–0)
PLATELET # BLD AUTO: 293 K/UL — SIGNIFICANT CHANGE UP (ref 150–400)
POTASSIUM SERPL-MCNC: 4.1 MMOL/L — SIGNIFICANT CHANGE UP (ref 3.5–5.3)
POTASSIUM SERPL-SCNC: 4.1 MMOL/L — SIGNIFICANT CHANGE UP (ref 3.5–5.3)
PROTHROM AB SERPL-ACNC: 12.5 SEC — SIGNIFICANT CHANGE UP (ref 10–12.9)
RBC # BLD: 3.17 M/UL — LOW (ref 4.2–5.8)
RBC # FLD: 14.4 % — SIGNIFICANT CHANGE UP (ref 10.3–14.5)
SODIUM SERPL-SCNC: 144 MMOL/L — SIGNIFICANT CHANGE UP (ref 135–145)
WBC # BLD: 5.86 K/UL — SIGNIFICANT CHANGE UP (ref 3.8–10.5)
WBC # FLD AUTO: 5.86 K/UL — SIGNIFICANT CHANGE UP (ref 3.8–10.5)

## 2019-06-09 PROCEDURE — 99232 SBSQ HOSP IP/OBS MODERATE 35: CPT

## 2019-06-09 RX ORDER — OXYCODONE AND ACETAMINOPHEN 5; 325 MG/1; MG/1
1 TABLET ORAL ONCE
Refills: 0 | Status: DISCONTINUED | OUTPATIENT
Start: 2019-06-09 | End: 2019-06-09

## 2019-06-09 RX ORDER — ONDANSETRON 8 MG/1
4 TABLET, FILM COATED ORAL ONCE
Refills: 0 | Status: COMPLETED | OUTPATIENT
Start: 2019-06-09 | End: 2019-06-09

## 2019-06-09 RX ORDER — ONDANSETRON 8 MG/1
4 TABLET, FILM COATED ORAL ONCE
Refills: 0 | Status: DISCONTINUED | OUTPATIENT
Start: 2019-06-09 | End: 2019-06-09

## 2019-06-09 RX ORDER — FUROSEMIDE 40 MG
40 TABLET ORAL DAILY
Refills: 0 | Status: DISCONTINUED | OUTPATIENT
Start: 2019-06-10 | End: 2019-06-11

## 2019-06-09 RX ORDER — FUROSEMIDE 40 MG
40 TABLET ORAL ONCE
Refills: 0 | Status: COMPLETED | OUTPATIENT
Start: 2019-06-09 | End: 2019-06-09

## 2019-06-09 RX ADMIN — Medication 3 UNIT(S): at 08:22

## 2019-06-09 RX ADMIN — TAMSULOSIN HYDROCHLORIDE 0.4 MILLIGRAM(S): 0.4 CAPSULE ORAL at 21:30

## 2019-06-09 RX ADMIN — Medication 1 GRAM(S): at 17:18

## 2019-06-09 RX ADMIN — Medication 81 MILLIGRAM(S): at 12:38

## 2019-06-09 RX ADMIN — PANTOPRAZOLE SODIUM 40 MILLIGRAM(S): 20 TABLET, DELAYED RELEASE ORAL at 05:06

## 2019-06-09 RX ADMIN — INSULIN GLARGINE 10 UNIT(S): 100 INJECTION, SOLUTION SUBCUTANEOUS at 21:31

## 2019-06-09 RX ADMIN — Medication 100 MILLIGRAM(S): at 13:35

## 2019-06-09 RX ADMIN — ONDANSETRON 4 MILLIGRAM(S): 8 TABLET, FILM COATED ORAL at 22:09

## 2019-06-09 RX ADMIN — POLYETHYLENE GLYCOL 3350 17 GRAM(S): 17 POWDER, FOR SOLUTION ORAL at 21:30

## 2019-06-09 RX ADMIN — SENNA PLUS 2 TABLET(S): 8.6 TABLET ORAL at 21:31

## 2019-06-09 RX ADMIN — PANTOPRAZOLE SODIUM 40 MILLIGRAM(S): 20 TABLET, DELAYED RELEASE ORAL at 17:18

## 2019-06-09 RX ADMIN — Medication 1 PATCH: at 12:38

## 2019-06-09 RX ADMIN — Medication 1 PATCH: at 12:40

## 2019-06-09 RX ADMIN — Medication 3 UNIT(S): at 17:18

## 2019-06-09 RX ADMIN — Medication 1: at 12:37

## 2019-06-09 RX ADMIN — HEPARIN SODIUM 1500 UNIT(S)/HR: 5000 INJECTION INTRAVENOUS; SUBCUTANEOUS at 09:25

## 2019-06-09 RX ADMIN — Medication 1 GRAM(S): at 05:07

## 2019-06-09 RX ADMIN — OXYCODONE AND ACETAMINOPHEN 1 TABLET(S): 5; 325 TABLET ORAL at 22:50

## 2019-06-09 RX ADMIN — Medication 100 MILLIGRAM(S): at 05:06

## 2019-06-09 RX ADMIN — Medication 3 UNIT(S): at 12:38

## 2019-06-09 RX ADMIN — Medication 100 MILLIGRAM(S): at 21:30

## 2019-06-09 RX ADMIN — Medication 40 MILLIGRAM(S): at 07:43

## 2019-06-09 RX ADMIN — Medication 1 PATCH: at 19:51

## 2019-06-09 RX ADMIN — ATORVASTATIN CALCIUM 80 MILLIGRAM(S): 80 TABLET, FILM COATED ORAL at 21:31

## 2019-06-09 RX ADMIN — OXYCODONE AND ACETAMINOPHEN 1 TABLET(S): 5; 325 TABLET ORAL at 22:09

## 2019-06-09 RX ADMIN — Medication 1 PATCH: at 08:23

## 2019-06-09 NOTE — PROGRESS NOTE ADULT - PROBLEM SELECTOR PLAN 1
PE, PAF, GI bleed  Anemia  GI eval noted  on Heparin for PE  out of bed as tolerated  increase activity  serial Hgb  cvs regimen and AF - rate control and BP control  tolerating room air  smoking cess ed - counseling - Bronchodilators for emphysema  will need PFT as outpatient - and CT chest in 6 months for pulm nodules follow up.

## 2019-06-09 NOTE — PROGRESS NOTE ADULT - SUBJECTIVE AND OBJECTIVE BOX
Date/Time Patient Seen:  		  Referring MD:   Data Reviewed	       Patient is a 48y old  Male who presents with a chief complaint of PE (08 Jun 2019 15:22)      Subjective/HPI     PAST MEDICAL & SURGICAL HISTORY:  BPH (benign prostatic hyperplasia)  Hypertension  Afib  Diabetes mellitus with no complication  No pertinent past medical history  H/O abdominal surgery  No significant past surgical history        Medication list         MEDICATIONS  (STANDING):  amiodarone    Tablet 100 milliGRAM(s) Oral daily  aspirin enteric coated 81 milliGRAM(s) Oral daily  atorvastatin 80 milliGRAM(s) Oral at bedtime  dextrose 5%. 1000 milliLiter(s) (50 mL/Hr) IV Continuous <Continuous>  dextrose 50% Injectable 12.5 Gram(s) IV Push once  dextrose 50% Injectable 25 Gram(s) IV Push once  dextrose 50% Injectable 25 Gram(s) IV Push once  docusate sodium 100 milliGRAM(s) Oral three times a day  heparin  Infusion.  Unit(s)/Hr (15 mL/Hr) IV Continuous <Continuous>  insulin glargine Injectable (LANTUS) 10 Unit(s) SubCutaneous at bedtime  insulin lispro (HumaLOG) corrective regimen sliding scale   SubCutaneous three times a day before meals  insulin lispro (HumaLOG) corrective regimen sliding scale   SubCutaneous at bedtime  insulin lispro Injectable (HumaLOG) 3 Unit(s) SubCutaneous three times a day before meals  nicotine - 21 mG/24Hr(s) Patch 1 patch Transdermal daily  pantoprazole    Tablet 40 milliGRAM(s) Oral two times a day  polyethylene glycol 3350 17 Gram(s) Oral at bedtime  senna 2 Tablet(s) Oral at bedtime  sucralfate 1 Gram(s) Oral two times a day  tamsulosin 0.4 milliGRAM(s) Oral at bedtime    MEDICATIONS  (PRN):  ALBUTerol    90 MICROgram(s) HFA Inhaler 2 Puff(s) Inhalation every 6 hours PRN Shortness of Breath and/or Wheezing  dextrose 40% Gel 15 Gram(s) Oral once PRN Blood Glucose LESS THAN 70 milliGRAM(s)/deciliter  glucagon  Injectable 1 milliGRAM(s) IntraMuscular once PRN Glucose LESS THAN 70 milligrams/deciliter  heparin  Injectable 6500 Unit(s) IV Push every 6 hours PRN For aPTT less than 40  heparin  Injectable 3000 Unit(s) IV Push every 6 hours PRN For aPTT between 40 - 57         Vitals log        ICU Vital Signs Last 24 Hrs  T(C): 36.4 (09 Jun 2019 04:08), Max: 36.9 (08 Jun 2019 11:47)  T(F): 97.6 (09 Jun 2019 04:08), Max: 98.5 (08 Jun 2019 11:47)  HR: 62 (09 Jun 2019 04:08) (48 - 62)  BP: 125/80 (09 Jun 2019 04:08) (99/58 - 129/79)  BP(mean): --  ABP: --  ABP(mean): --  RR: 18 (09 Jun 2019 04:08) (18 - 21)  SpO2: 97% (09 Jun 2019 04:08) (95% - 100%)           Input and Output:  I&O's Detail    07 Jun 2019 07:01  -  08 Jun 2019 07:00  --------------------------------------------------------  IN:    heparin  Infusion.: 120 mL    Oral Fluid: 480 mL  Total IN: 600 mL    OUT:  Total OUT: 0 mL    Total NET: 600 mL      08 Jun 2019 07:01  -  09 Jun 2019 06:45  --------------------------------------------------------  IN:    heparin  Infusion.: 360 mL    Oral Fluid: 660 mL  Total IN: 1020 mL    OUT:    Voided: 700 mL  Total OUT: 700 mL    Total NET: 320 mL          Lab Data                        9.3    5.84  )-----------( 300      ( 08 Jun 2019 08:10 )             29.9     06-08    144  |  110<H>  |  12  ----------------------------<  97  4.0   |  27  |  1.10    Ca    8.6      08 Jun 2019 08:10        CARDIAC MARKERS ( 07 Jun 2019 07:54 )  .025 ng/mL / x     / 64 U/L / x     / 3.0 ng/mL        Review of Systems	      Objective     Physical Examination    heart s1s2  lung dec BS      Pertinent Lab findings & Imaging      Ale:  NO   Adequate UO     I&O's Detail    07 Jun 2019 07:01  -  08 Jun 2019 07:00  --------------------------------------------------------  IN:    heparin  Infusion.: 120 mL    Oral Fluid: 480 mL  Total IN: 600 mL    OUT:  Total OUT: 0 mL    Total NET: 600 mL      08 Jun 2019 07:01  -  09 Jun 2019 06:45  --------------------------------------------------------  IN:    heparin  Infusion.: 360 mL    Oral Fluid: 660 mL  Total IN: 1020 mL    OUT:    Voided: 700 mL  Total OUT: 700 mL    Total NET: 320 mL               Discussed with:     Cultures:	        Radiology

## 2019-06-09 NOTE — PROGRESS NOTE ADULT - SUBJECTIVE AND OBJECTIVE BOX
Patient is a 48y old  Male who presents with a chief complaint of PE (09 Jun 2019 07:26)      INTERVAL HPI/OVERNIGHT EVENTS: Patient seen and examined. NAD. No complaints.    Vital Signs Last 24 Hrs  T(C): 36.5 (09 Jun 2019 07:20), Max: 36.9 (08 Jun 2019 15:57)  T(F): 97.7 (09 Jun 2019 07:20), Max: 98.5 (08 Jun 2019 15:57)  HR: 52 (09 Jun 2019 07:20) (48 - 62)  BP: 131/76 (09 Jun 2019 07:20) (111/69 - 131/76)  BP(mean): --  RR: 19 (09 Jun 2019 07:20) (18 - 20)  SpO2: 94% (09 Jun 2019 07:20) (94% - 100%)    06-09    144  |  110<H>  |  13  ----------------------------<  116<H>  4.1   |  27  |  1.20    Ca    8.6      09 Jun 2019 08:30  Mg     2.2     06-09                            9.9    5.86  )-----------( 293      ( 09 Jun 2019 08:30 )             31.0     PT/INR - ( 09 Jun 2019 08:30 )   PT: 12.5 sec;   INR: 1.10 ratio         PTT - ( 09 Jun 2019 08:30 )  PTT:94.3 sec  CAPILLARY BLOOD GLUCOSE      POCT Blood Glucose.: 129 mg/dL (09 Jun 2019 08:18)  POCT Blood Glucose.: 183 mg/dL (08 Jun 2019 21:41)  POCT Blood Glucose.: 186 mg/dL (08 Jun 2019 17:05)  POCT Blood Glucose.: 126 mg/dL (08 Jun 2019 12:24)              ALBUTerol    90 MICROgram(s) HFA Inhaler 2 Puff(s) Inhalation every 6 hours PRN  amiodarone    Tablet 100 milliGRAM(s) Oral daily  aspirin enteric coated 81 milliGRAM(s) Oral daily  atorvastatin 80 milliGRAM(s) Oral at bedtime  dextrose 40% Gel 15 Gram(s) Oral once PRN  dextrose 5%. 1000 milliLiter(s) IV Continuous <Continuous>  dextrose 50% Injectable 12.5 Gram(s) IV Push once  dextrose 50% Injectable 25 Gram(s) IV Push once  dextrose 50% Injectable 25 Gram(s) IV Push once  docusate sodium 100 milliGRAM(s) Oral three times a day  glucagon  Injectable 1 milliGRAM(s) IntraMuscular once PRN  heparin  Infusion.  Unit(s)/Hr IV Continuous <Continuous>  heparin  Injectable 6500 Unit(s) IV Push every 6 hours PRN  heparin  Injectable 3000 Unit(s) IV Push every 6 hours PRN  insulin glargine Injectable (LANTUS) 10 Unit(s) SubCutaneous at bedtime  insulin lispro (HumaLOG) corrective regimen sliding scale   SubCutaneous three times a day before meals  insulin lispro (HumaLOG) corrective regimen sliding scale   SubCutaneous at bedtime  insulin lispro Injectable (HumaLOG) 3 Unit(s) SubCutaneous three times a day before meals  nicotine - 21 mG/24Hr(s) Patch 1 patch Transdermal daily  pantoprazole    Tablet 40 milliGRAM(s) Oral two times a day  polyethylene glycol 3350 17 Gram(s) Oral at bedtime  senna 2 Tablet(s) Oral at bedtime  sucralfate 1 Gram(s) Oral two times a day  tamsulosin 0.4 milliGRAM(s) Oral at bedtime              REVIEW OF SYSTEMS:  CONSTITUTIONAL: No fever, no weight loss, or no fatigue  NECK: No pain, no stiffness  RESPIRATORY: No cough, no wheezing, no chills, no hemoptysis, No shortness of breath  CARDIOVASCULAR: No chest pain, no palpitations, no dizziness, no leg swelling  GASTROINTESTINAL: No abdominal pain. No nausea, no vomiting, no hematemesis; No diarrhea, no constipation. No melena, no hematochezia.  GENITOURINARY: No dysuria, no frequency, no hematuria, no incontinence  NEUROLOGICAL: No headaches, no loss of strength, no numbness, no tremors  SKIN: No itching, no burning  MUSCULOSKELETAL: No joint pain, no swelling; No muscle, no back, no extremity pain  PSYCHIATRIC: No depression, no mood swings,   HEME/LYMPH: No easy bruising, no bleeding gums  ALLERY AND IMMUNOLOGIC: No hives       Consultant(s) Notes Reviewed:  [X] YES  [ ] NO    PHYSICAL EXAM:  GENERAL: NAD  HEAD:  Atraumatic, Normocephalic  EYES: EOMI, PERRLA, conjunctiva and sclera clear  ENMT: No tonsillar erythema, exudates, or enlargement; Moist mucous membranes  NECK: Supple, No JVD  NERVOUS SYSTEM:  Awake & alert  CHEST/LUNG: Clear to auscultation bilaterally; No rales, rhonchi, wheezing,  HEART: Regular rate and rhythm  ABDOMEN: Soft, Nontender, Nondistended; Bowel sounds present  EXTREMITIES:  No clubbing, cyanosis, or edema  LYMPH: No lymphadenopathy noted  SKIN: No rashes      Advanced care planning discussed with patient/family [X] YES   [ ] NO    Advanced care planning discussed with patient/family. Advanced care planning forms reviewed/discussed/completed. 20 minutes spent.

## 2019-06-09 NOTE — PROGRESS NOTE ADULT - ASSESSMENT
48 year old male with PMH of afib, NSTEMI, osteomyelitis,  HTN and DM2, who presented to ED with chest pressure, admitted for management of PE. Patient was recently was admitted to [place] for perforated Antral Gastric Ulcer, s/p Emergent Exp-lap Omentopexy and plication 5/11. Found to have new onset afib with elevated troponin post-operatively indicative of NSTEMI, started on Amiodarone and converted to sinus rhythm, left foot osteo s/p debridement OR 5/28    Cardio consulted for chest pain after admission     PE (RUL subsegmental)  - C/W heparin gtt with eventual switching to NOAC.   Will defer to Pulm  - Target aPTT = 58 - 99 seconds.   - Venous USD negative.  - Monitor for s/s bleeding  - Continue to monitor on tele    Afib  - He went into Afib post op last admission requiring Amio load  - It was short-lived that it was decided he did not need long term AC, with plans to do an outpatient event monitor  - He remained in NSR throughout his stay prior to discharge  - Remains in SB to 40s-50's on monitor.  Continue lower dose of Amiodarone 100 mg daily   - No further episodes of NSVT   - Monitor electrolytes, replete to keep K>4 and Mag>2     NSTEMI vs demand ischemia  - He had elevated troponins post op from his recent admission, though all his CPK's were negative.  Plan was outpatient ischemic eval  - TTE then showed EF 65% with no segmental wall motion abnormality  - EKG unchanged from admission, no acute ST changes.  His cardiac enzymes this admission are all negative  - Continue 81 mg daily  - Continue statin  - Hold ACEI  - Unable to start BB secondary to persistent bradycardia    Edema  - b/l pitting LE edema L>R is chronic.  He has no evidence of volume overload apart from his edema.  Pro-BNP = 614  - Give Lasix 40 mg IV x 1 now.  Start 40 mg PO daily staring in am    HTN  - BP well controlled, not on any BP medications  - monitor vitals closely, can continue amiodarone, atorvastatin    DM2  - Per Primary    - Other cardiovascular workup will depend on clinical course. All other workup per primary team.  - Will follow.    Henrietta Kim [facility]  Cardiology 48 year old male with PMH of afib, NSTEMI, osteomyelitis,  HTN and DM2, who presented to ED with chest pressure, admitted for management of PE. Patient was recently was admitted to [place] for perforated Antral Gastric Ulcer, s/p Emergent Exp-lap Omentopexy and plication 5/11. Found to have new onset afib with elevated troponin post-operatively indicative of NSTEMI, started on Amiodarone and converted to sinus rhythm, left foot osteo s/p debridement OR 5/28    Cardio consulted for chest pain after admission     PE (RUL subsegmental)  - C/W heparin gtt with eventual switching to NOAC.   Will defer to Pulm  - Target aPTT = 58 - 99 seconds.   - Venous USD negative.  - Monitor for s/s bleeding  - Continue to monitor on tele    Afib  - He went into Afib post op last admission requiring Amio load  - It was short-lived that it was decided he did not need long term AC, with plans to do an outpatient event monitor  - He remained in NSR throughout his stay prior to discharge  - Remains in SB to 40s-50's on monitor.  Continue lower dose of Amiodarone 100 mg daily   - No further episodes of NSVT   - Monitor electrolytes, replete to keep K>4 and Mag>2     NSTEMI vs demand ischemia  - He had elevated troponins post op from his recent admission, though all his CPK's were negative.  Plan was outpatient ischemic eval  - TTE then showed EF 65% with no segmental wall motion abnormality  - EKG unchanged from admission, no acute ST changes.  His cardiac enzymes this admission are all negative  - Continue 81 mg daily  - Continue statin  - Hold ACEI  - Unable to start BB secondary to persistent bradycardia    Edema  - b/l pitting LE edema L>R may be chronic.  He has no evidence of volume overload apart from his edema.  Pro-BNP = 614  - Give Lasix 40 mg IV x 1 now.  Start 40 mg PO daily starting in am    HTN  - BP well controlled, not on any BP medications  - monitor vitals closely, can continue amiodarone, atorvastatin    DM2  - Per Primary    - Other cardiovascular workup will depend on clinical course. All other workup per primary team.  - Will follow.    Henrietta Kim [facility]  Cardiology

## 2019-06-09 NOTE — PROGRESS NOTE ADULT - SUBJECTIVE AND OBJECTIVE BOX
[facility] -- Bolivar Carbajal, Brittany Gallegos Pannella, Patel, Savella  Office # [phone]    Follow Up:  PE, Recent NSTEMI    Subjective/Observations: Awake and alert, comfortable on RA.  Denies any respiratory discomfort.  However, still c/o constant pressure-like CP that is worse on deep inspiration.  Overnight, remained sinus johnny on tele at 40's-50's, though remained hemodynamically stable    REVIEW OF SYSTEMS: All other review of systems is negative unless indicated above    PAST MEDICAL & SURGICAL HISTORY:  BPH (benign prostatic hyperplasia)  Hypertension  Afib  Diabetes mellitus with no complication  H/O abdominal surgery    MEDICATIONS  (STANDING):  amiodarone    Tablet 100 milliGRAM(s) Oral daily  aspirin enteric coated 81 milliGRAM(s) Oral daily  atorvastatin 80 milliGRAM(s) Oral at bedtime  dextrose 5%. 1000 milliLiter(s) (50 mL/Hr) IV Continuous <Continuous>  dextrose 50% Injectable 12.5 Gram(s) IV Push once  dextrose 50% Injectable 25 Gram(s) IV Push once  dextrose 50% Injectable 25 Gram(s) IV Push once  docusate sodium 100 milliGRAM(s) Oral three times a day  furosemide   Injectable 40 milliGRAM(s) IV Push once  heparin  Infusion.  Unit(s)/Hr (15 mL/Hr) IV Continuous <Continuous>  insulin glargine Injectable (LANTUS) 10 Unit(s) SubCutaneous at bedtime  insulin lispro (HumaLOG) corrective regimen sliding scale   SubCutaneous three times a day before meals  insulin lispro (HumaLOG) corrective regimen sliding scale   SubCutaneous at bedtime  insulin lispro Injectable (HumaLOG) 3 Unit(s) SubCutaneous three times a day before meals  nicotine - 21 mG/24Hr(s) Patch 1 patch Transdermal daily  pantoprazole    Tablet 40 milliGRAM(s) Oral two times a day  polyethylene glycol 3350 17 Gram(s) Oral at bedtime  senna 2 Tablet(s) Oral at bedtime  sucralfate 1 Gram(s) Oral two times a day  tamsulosin 0.4 milliGRAM(s) Oral at bedtime    MEDICATIONS  (PRN):  ALBUTerol    90 MICROgram(s) HFA Inhaler 2 Puff(s) Inhalation every 6 hours PRN Shortness of Breath and/or Wheezing  dextrose 40% Gel 15 Gram(s) Oral once PRN Blood Glucose LESS THAN 70 milliGRAM(s)/deciliter  glucagon  Injectable 1 milliGRAM(s) IntraMuscular once PRN Glucose LESS THAN 70 milligrams/deciliter  heparin  Injectable 6500 Unit(s) IV Push every 6 hours PRN For aPTT less than 40  heparin  Injectable 3000 Unit(s) IV Push every 6 hours PRN For aPTT between 40 - 57    Allergies    fish (Hives)  No Known Drug Allergies    Intolerances    Vital Signs Last 24 Hrs  T(C): 36.5 (09 Jun 2019 07:20), Max: 36.9 (08 Jun 2019 11:47)  T(F): 97.7 (09 Jun 2019 07:20), Max: 98.5 (08 Jun 2019 11:47)  HR: 52 (09 Jun 2019 07:20) (48 - 62)  BP: 131/76 (09 Jun 2019 07:20) (99/58 - 131/76)  BP(mean): --  RR: 19 (09 Jun 2019 07:20) (18 - 21)  SpO2: 94% (09 Jun 2019 07:20) (94% - 100%)    I&O's Summary    08 Jun 2019 07:01  -  09 Jun 2019 07:00  --------------------------------------------------------  IN: 1020 mL / OUT: 700 mL / NET: 320 mL    PHYSICAL EXAM:  TELE: Sinus johnny, 40's  Constitutional: NAD, awake and alert, well-developed  HEENT: Moist Mucous Membranes, Anicteric  Pulmonary: Non-labored, breath sounds are clear bilaterally, No wheezing, rales or rhonchi  Cardiovascular: Regular, S1 and S2, No murmurs, rubs, gallops or clicks  Gastrointestinal: Bowel Sounds present, soft, nontender.   Lymph: 2+ BLE edema. No lymphadenopathy.  Skin: No visible rashes.  Left foot ulcers  Psych:  Mood & affect appropriate    LABS: All Labs Reviewed:                        9.3    5.84  )-----------( 300      ( 08 Jun 2019 08:10 )             29.9                         8.9    5.65  )-----------( 301      ( 07 Jun 2019 05:05 )             28.3                         8.4    6.71  )-----------( 306      ( 06 Jun 2019 07:54 )             26.7     08 Jun 2019 08:10    144    |  110    |  12     ----------------------------<  97     4.0     |  27     |  1.10   07 Jun 2019 05:05    144    |  111    |  15     ----------------------------<  143    3.8     |  27     |  1.20     Ca    8.6        08 Jun 2019 08:10  Ca    8.3        07 Jun 2019 05:05  Phos  3.3       07 Jun 2019 05:05  Mg     2.1       07 Jun 2019 05:05    PTT - ( 08 Jun 2019 08:10 )  PTT:88.2 sec  CARDIAC MARKERS ( 07 Jun 2019 07:54 )  .025 ng/mL / x     / 64 U/L / x     / 3.0 ng/mL    < from: TTE Echo Doppler w/o Cont (05.16.19 @ 09:45) >     EXAM:  ECHO TTE WO CON COMP W DOPPLR         PROCEDURE DATE:  05/16/2019        INTERPRETATION:  INDICATION: Elevated troponin    Blood Pressure 137/62    Height 187.96 cm     Weight 81.6 kg       BSA   2.1 sq m    Dimensions:    LA 4.1       Normal Values: 2.0 - 4.0 cm    Ao 3.6        Normal Values: 2.0 - 3.8 cm  SEPTUM 1.3       Normal Values: 0.6 - 1.2 cm  PWT 1.3       Normal Values: 0.6 - 1.1 cm  LVIDd 6.0         Normal Values: 3.0 - 5.6 cm  LVIDs 3.1         Normal Values: 1.8 - 4.0 cm      OBSERVATIONS:    Mitral Valve: Thickened leaflets, moderate MR.  Aortic Valve/Aorta: Sclerotic trileaflet aortic valve with normal   opening. Trace AI  Tricuspid Valve: normal with mild TR.  Pulmonic Valve: normal  Left Atrium: Mildly dilated  Right Atrium: normal  Left Ventricle: normal LV size and systolic function, estimated LVEF of   65%. No evidence of segmental dysfunction. Basal septal hypertrophy is   present  Right Ventricle: normal size and systolic function.  Pericardium/Pleura: normal, no significant pericardial effusion.    Conclusion:     normal LV size and systolic function, estimated LVEF of 65%. No evidence   of segmental dysfunction  Normal RV size and systolic function.   Mild left atrial enlargement  Sclerotic trileaflet aortic valve with normal opening. Trace AI  Mitral valve with thickened leaflets and moderate MR  Mild TR  No significant pericardial effusion.      MIROSLAVA CASTAÑEDA   This document has been electronically signed. May 17 2019  7:45AM     < end of copied text >    < from: CT Angio Chest w/ IV Cont (06.06.19 @ 00:07) >    EXAM:  CT ANGIO CHEST (W)AW IC                            PROCEDURE DATE:  06/06/2019          INTERPRETATION:  CLINICAL INFORMATION: Chest pain.    COMPARISON: None.    PROCEDURE:   CT Angiography of the Chest.  95 ml of Omnipaque 350 was injectedintravenously. 5 ml were discarded.  Sagittal and coronal reformats were performed as well as 3D (MIP)   reconstructions.      FINDINGS:    LUNGS AND AIRWAYS: Patent central airways.  Paraseptal and centrilobular   emphysema. Few scattered small pulmonary nodules up to 4 mm within the   right lower lobe along the major fissure (series 3, image 61). Bilateral   lower lobe subsegmental atelectasis.    PLEURA: Trace left pleural effusion.    MEDIASTINUM AND ANAYA: No lymphadenopathy.    VESSELS: Adequate opacification the pulmonary arteries. Subsegmental   right upper lobe filling defects consistent with pulmonary emboli.   Question subsegmental right middle lobe pulmonary embolus.    HEART: Heart size is normal. No pericardial effusion. Coronaryartery   calcification.    CHEST WALL AND LOWER NECK: Mild bilateral gynecomastia.     VISUALIZED UPPER ABDOMEN: Midline postsurgical change within the upper   abdominal wall.    BONES: Chronic fracture deformity of the left posterolateral eighth and  ninth ribs. Multilevel degenerative changes of the spine.    IMPRESSION:     Subsegmental right upper lobe pulmonary emboli. Question right middle   lobe subsegmental pulmonary embolus.    Emphysema.    Few scattered pulmonary nodules measuring up to 4 mm. 1 year interval   noncontrast chest CT may be obtained to demonstrate stability.    Dr. Flores discussed the above findings with Dr. Sauceda at 1:04 AM on   6/6/2019 with read back.    ABHISHEK FLORES M.D., ATTENDING RADIOLOGIST  This document has been electronically signed. Jun 6 2019  1:09AM     < end of copied text >    < from: Xray Chest 1 View AP/PA (06.05.19 @ 18:48) >    EXAM:  XR CHEST AP OR PA 1V                          PROCEDURE DATE:  06/05/2019      INTERPRETATION:  Chest pain.    AP chest. Prior 5/12/2019.    Heart magnified by projection. Symmetrically hyperinflated lungs.   Nonspecific accentuated bibasilar markings likely reflect fibrotic   atelectatic changes. No focal consolidation or pleural effusion.    Impression: Hyperinflated lungs. No active infiltrates.    WENDI HANSON M.D., ATTENDING RADIOLOGIST  This document has beenelectronically signed. Jun 6 2019  8:37AM      < end of copied text >

## 2019-06-10 DIAGNOSIS — R60.0 LOCALIZED EDEMA: ICD-10-CM

## 2019-06-10 LAB
ANION GAP SERPL CALC-SCNC: 6 MMOL/L — SIGNIFICANT CHANGE UP (ref 5–17)
APTT BLD: 88.5 SEC — HIGH (ref 28.5–37)
BUN SERPL-MCNC: 13 MG/DL — SIGNIFICANT CHANGE UP (ref 7–23)
CALCIUM SERPL-MCNC: 8.4 MG/DL — LOW (ref 8.5–10.1)
CHLORIDE SERPL-SCNC: 108 MMOL/L — SIGNIFICANT CHANGE UP (ref 96–108)
CO2 SERPL-SCNC: 28 MMOL/L — SIGNIFICANT CHANGE UP (ref 22–31)
CREAT SERPL-MCNC: 1.3 MG/DL — SIGNIFICANT CHANGE UP (ref 0.5–1.3)
GLUCOSE SERPL-MCNC: 139 MG/DL — HIGH (ref 70–99)
HCT VFR BLD CALC: 30.2 % — LOW (ref 39–50)
HGB BLD-MCNC: 9.5 G/DL — LOW (ref 13–17)
INR BLD: 1.2 RATIO — HIGH (ref 0.88–1.16)
MAGNESIUM SERPL-MCNC: 2 MG/DL — SIGNIFICANT CHANGE UP (ref 1.6–2.6)
MCHC RBC-ENTMCNC: 30.8 PG — SIGNIFICANT CHANGE UP (ref 27–34)
MCHC RBC-ENTMCNC: 31.5 GM/DL — LOW (ref 32–36)
MCV RBC AUTO: 98.1 FL — SIGNIFICANT CHANGE UP (ref 80–100)
NRBC # BLD: 0 /100 WBCS — SIGNIFICANT CHANGE UP (ref 0–0)
PLATELET # BLD AUTO: 267 K/UL — SIGNIFICANT CHANGE UP (ref 150–400)
POTASSIUM SERPL-MCNC: 3.8 MMOL/L — SIGNIFICANT CHANGE UP (ref 3.5–5.3)
POTASSIUM SERPL-SCNC: 3.8 MMOL/L — SIGNIFICANT CHANGE UP (ref 3.5–5.3)
PROTHROM AB SERPL-ACNC: 13.7 SEC — HIGH (ref 10–12.9)
RBC # BLD: 3.08 M/UL — LOW (ref 4.2–5.8)
RBC # FLD: 14.3 % — SIGNIFICANT CHANGE UP (ref 10.3–14.5)
SODIUM SERPL-SCNC: 142 MMOL/L — SIGNIFICANT CHANGE UP (ref 135–145)
WBC # BLD: 6.12 K/UL — SIGNIFICANT CHANGE UP (ref 3.8–10.5)
WBC # FLD AUTO: 6.12 K/UL — SIGNIFICANT CHANGE UP (ref 3.8–10.5)

## 2019-06-10 PROCEDURE — 99232 SBSQ HOSP IP/OBS MODERATE 35: CPT

## 2019-06-10 RX ORDER — POTASSIUM CHLORIDE 20 MEQ
1 PACKET (EA) ORAL
Qty: 30 | Refills: 0
Start: 2019-06-10 | End: 2019-07-09

## 2019-06-10 RX ORDER — ONDANSETRON 8 MG/1
4 TABLET, FILM COATED ORAL ONCE
Refills: 0 | Status: COMPLETED | OUTPATIENT
Start: 2019-06-10 | End: 2019-06-10

## 2019-06-10 RX ORDER — RIVAROXABAN 15 MG-20MG
20 KIT ORAL EVERY 24 HOURS
Refills: 0 | Status: DISCONTINUED | OUTPATIENT
Start: 2019-06-10 | End: 2019-06-10

## 2019-06-10 RX ORDER — ATORVASTATIN CALCIUM 80 MG/1
1 TABLET, FILM COATED ORAL
Qty: 30 | Refills: 0
Start: 2019-06-10 | End: 2019-07-09

## 2019-06-10 RX ORDER — ASPIRIN/CALCIUM CARB/MAGNESIUM 324 MG
1 TABLET ORAL
Qty: 30 | Refills: 0
Start: 2019-06-10 | End: 2019-07-09

## 2019-06-10 RX ORDER — SUCRALFATE 1 G
1 TABLET ORAL
Qty: 60 | Refills: 0
Start: 2019-06-10 | End: 2019-07-09

## 2019-06-10 RX ORDER — RIVAROXABAN 15 MG-20MG
15 KIT ORAL
Refills: 0 | Status: DISCONTINUED | OUTPATIENT
Start: 2019-06-10 | End: 2019-06-10

## 2019-06-10 RX ORDER — APIXABAN 2.5 MG/1
2 TABLET, FILM COATED ORAL
Qty: 120 | Refills: 0
Start: 2019-06-10 | End: 2019-07-09

## 2019-06-10 RX ORDER — OXYCODONE AND ACETAMINOPHEN 5; 325 MG/1; MG/1
1 TABLET ORAL ONCE
Refills: 0 | Status: DISCONTINUED | OUTPATIENT
Start: 2019-06-10 | End: 2019-06-10

## 2019-06-10 RX ORDER — APIXABAN 2.5 MG/1
10 TABLET, FILM COATED ORAL EVERY 12 HOURS
Refills: 0 | Status: DISCONTINUED | OUTPATIENT
Start: 2019-06-10 | End: 2019-06-13

## 2019-06-10 RX ORDER — RIVAROXABAN 15 MG-20MG
20 KIT ORAL EVERY 24 HOURS
Refills: 0 | Status: DISCONTINUED | OUTPATIENT
Start: 2019-07-02 | End: 2019-06-10

## 2019-06-10 RX ORDER — AMIODARONE HYDROCHLORIDE 400 MG/1
1 TABLET ORAL
Qty: 30 | Refills: 0
Start: 2019-06-10 | End: 2019-07-09

## 2019-06-10 RX ORDER — FUROSEMIDE 40 MG
1 TABLET ORAL
Qty: 30 | Refills: 0
Start: 2019-06-10 | End: 2019-07-09

## 2019-06-10 RX ADMIN — Medication 100 MILLIGRAM(S): at 05:18

## 2019-06-10 RX ADMIN — Medication 100 MILLIGRAM(S): at 21:06

## 2019-06-10 RX ADMIN — Medication 81 MILLIGRAM(S): at 12:24

## 2019-06-10 RX ADMIN — Medication 40 MILLIGRAM(S): at 05:18

## 2019-06-10 RX ADMIN — SENNA PLUS 2 TABLET(S): 8.6 TABLET ORAL at 21:06

## 2019-06-10 RX ADMIN — Medication 1 PATCH: at 19:24

## 2019-06-10 RX ADMIN — Medication 1 GRAM(S): at 17:34

## 2019-06-10 RX ADMIN — Medication 3 UNIT(S): at 08:20

## 2019-06-10 RX ADMIN — Medication 1 GRAM(S): at 05:18

## 2019-06-10 RX ADMIN — PANTOPRAZOLE SODIUM 40 MILLIGRAM(S): 20 TABLET, DELAYED RELEASE ORAL at 18:59

## 2019-06-10 RX ADMIN — PANTOPRAZOLE SODIUM 40 MILLIGRAM(S): 20 TABLET, DELAYED RELEASE ORAL at 05:18

## 2019-06-10 RX ADMIN — Medication 1 PATCH: at 11:36

## 2019-06-10 RX ADMIN — Medication 1 PATCH: at 20:25

## 2019-06-10 RX ADMIN — Medication 3 UNIT(S): at 17:30

## 2019-06-10 RX ADMIN — Medication 0: at 17:31

## 2019-06-10 RX ADMIN — INSULIN GLARGINE 10 UNIT(S): 100 INJECTION, SOLUTION SUBCUTANEOUS at 21:06

## 2019-06-10 RX ADMIN — Medication 3 UNIT(S): at 12:24

## 2019-06-10 RX ADMIN — TAMSULOSIN HYDROCHLORIDE 0.4 MILLIGRAM(S): 0.4 CAPSULE ORAL at 21:06

## 2019-06-10 RX ADMIN — ATORVASTATIN CALCIUM 80 MILLIGRAM(S): 80 TABLET, FILM COATED ORAL at 21:06

## 2019-06-10 RX ADMIN — AMIODARONE HYDROCHLORIDE 100 MILLIGRAM(S): 400 TABLET ORAL at 05:20

## 2019-06-10 RX ADMIN — Medication 1: at 08:19

## 2019-06-10 RX ADMIN — APIXABAN 10 MILLIGRAM(S): 2.5 TABLET, FILM COATED ORAL at 17:32

## 2019-06-10 RX ADMIN — Medication 100 MILLIGRAM(S): at 14:29

## 2019-06-10 RX ADMIN — Medication 1 PATCH: at 12:23

## 2019-06-10 RX ADMIN — HEPARIN SODIUM 1500 UNIT(S)/HR: 5000 INJECTION INTRAVENOUS; SUBCUTANEOUS at 10:34

## 2019-06-10 NOTE — PROGRESS NOTE ADULT - SUBJECTIVE AND OBJECTIVE BOX
CAPILLARY BLOOD GLUCOSE      POCT Blood Glucose.: 232 mg/dL (09 Jun 2019 21:29)  POCT Blood Glucose.: 144 mg/dL (09 Jun 2019 17:05)  POCT Blood Glucose.: 157 mg/dL (09 Jun 2019 12:08)      Vital Signs Last 24 Hrs  T(C): 36.7 (10 Paul 2019 07:59), Max: 36.9 (09 Jun 2019 16:16)  T(F): 98.1 (10 Paul 2019 07:59), Max: 98.5 (09 Jun 2019 16:16)  HR: 59 (10 Paul 2019 07:59) (47 - 59)  BP: 101/67 (10 Paul 2019 07:59) (101/67 - 123/74)  BP(mean): --  RR: 18 (10 Paul 2019 07:59) (18 - 20)  SpO2: 96% (10 Paul 2019 07:59) (96% - 100%)    General: WN/WD NAD  Respiratory: CTA B/L  CV: RRR, S1S2, no murmurs, rubs or gallops  Abdominal: Soft, NT, ND +BS, Last BM  Extremities: b/l le foot dsg intact     06-10    142  |  108  |  13  ----------------------------<  139<H>  3.8   |  28  |  1.30    Ca    8.4<L>      10 Paul 2019 05:47  Mg     2.0     06-10        atorvastatin 80 milliGRAM(s) Oral at bedtime  dextrose 40% Gel 15 Gram(s) Oral once PRN  dextrose 50% Injectable 12.5 Gram(s) IV Push once  dextrose 50% Injectable 25 Gram(s) IV Push once  dextrose 50% Injectable 25 Gram(s) IV Push once  glucagon  Injectable 1 milliGRAM(s) IntraMuscular once PRN  insulin glargine Injectable (LANTUS) 10 Unit(s) SubCutaneous at bedtime  insulin lispro (HumaLOG) corrective regimen sliding scale   SubCutaneous three times a day before meals  insulin lispro (HumaLOG) corrective regimen sliding scale   SubCutaneous at bedtime  insulin lispro Injectable (HumaLOG) 3 Unit(s) SubCutaneous three times a day before meals

## 2019-06-10 NOTE — PHYSICAL THERAPY INITIAL EVALUATION ADULT - PERTINENT HX OF CURRENT PROBLEM, REHAB EVAL
47 yo M PMH Afib (on amiodarone), BPH, DM2, hx of NSTEMI, osteomyelitis of left foot s/p toe amputation presenting to the ED for chest pressure that started this afternoon. Of note, patient was discharged from [facility] this afternoon, and shortly after returning home, while at rest, patient felt 8/10 chest pressure that was non radiating. There was a short episode of SOB and dizziness but it resolved shortly afterwards but the chest pressure continued.

## 2019-06-10 NOTE — PROGRESS NOTE ADULT - PROBLEM SELECTOR PLAN 4
- no evidence volume overload, echo with nl EF May 2019  - likely venous stasis   - s/p 40 IV lasix, continue PO 40 lasix daily

## 2019-06-10 NOTE — PROGRESS NOTE ADULT - SUBJECTIVE AND OBJECTIVE BOX
Date/Time Patient Seen:  		  Referring MD:   Data Reviewed	       Patient is a 48y old  Male who presents with a chief complaint of PE (09 Jun 2019 11:54)      Subjective/HPI     PAST MEDICAL & SURGICAL HISTORY:  BPH (benign prostatic hyperplasia)  Hypertension  Afib  Diabetes mellitus with no complication  No pertinent past medical history  H/O abdominal surgery  No significant past surgical history        Medication list         MEDICATIONS  (STANDING):  amiodarone    Tablet 100 milliGRAM(s) Oral daily  aspirin enteric coated 81 milliGRAM(s) Oral daily  atorvastatin 80 milliGRAM(s) Oral at bedtime  dextrose 5%. 1000 milliLiter(s) (50 mL/Hr) IV Continuous <Continuous>  dextrose 50% Injectable 12.5 Gram(s) IV Push once  dextrose 50% Injectable 25 Gram(s) IV Push once  dextrose 50% Injectable 25 Gram(s) IV Push once  docusate sodium 100 milliGRAM(s) Oral three times a day  furosemide    Tablet 40 milliGRAM(s) Oral daily  heparin  Infusion.  Unit(s)/Hr (15 mL/Hr) IV Continuous <Continuous>  insulin glargine Injectable (LANTUS) 10 Unit(s) SubCutaneous at bedtime  insulin lispro (HumaLOG) corrective regimen sliding scale   SubCutaneous three times a day before meals  insulin lispro (HumaLOG) corrective regimen sliding scale   SubCutaneous at bedtime  insulin lispro Injectable (HumaLOG) 3 Unit(s) SubCutaneous three times a day before meals  nicotine - 21 mG/24Hr(s) Patch 1 patch Transdermal daily  pantoprazole    Tablet 40 milliGRAM(s) Oral two times a day  polyethylene glycol 3350 17 Gram(s) Oral at bedtime  senna 2 Tablet(s) Oral at bedtime  sucralfate 1 Gram(s) Oral two times a day  tamsulosin 0.4 milliGRAM(s) Oral at bedtime    MEDICATIONS  (PRN):  ALBUTerol    90 MICROgram(s) HFA Inhaler 2 Puff(s) Inhalation every 6 hours PRN Shortness of Breath and/or Wheezing  dextrose 40% Gel 15 Gram(s) Oral once PRN Blood Glucose LESS THAN 70 milliGRAM(s)/deciliter  glucagon  Injectable 1 milliGRAM(s) IntraMuscular once PRN Glucose LESS THAN 70 milligrams/deciliter  heparin  Injectable 6500 Unit(s) IV Push every 6 hours PRN For aPTT less than 40  heparin  Injectable 3000 Unit(s) IV Push every 6 hours PRN For aPTT between 40 - 57         Vitals log        ICU Vital Signs Last 24 Hrs  T(C): 36.3 (10 Paul 2019 04:09), Max: 36.9 (09 Jun 2019 16:16)  T(F): 97.4 (10 Paul 2019 04:09), Max: 98.5 (09 Jun 2019 16:16)  HR: 50 (10 Paul 2019 04:09) (47 - 57)  BP: 106/66 (10 Paul 2019 04:09) (106/66 - 131/76)  BP(mean): --  ABP: --  ABP(mean): --  RR: 18 (10 Paul 2019 04:09) (18 - 20)  SpO2: 97% (10 Paul 2019 04:09) (94% - 100%)           Input and Output:  I&O's Detail    08 Jun 2019 07:01  -  09 Jun 2019 07:00  --------------------------------------------------------  IN:    heparin  Infusion.: 375 mL    Oral Fluid: 660 mL  Total IN: 1035 mL    OUT:    Voided: 700 mL  Total OUT: 700 mL    Total NET: 335 mL      09 Jun 2019 07:01  -  10 Paul 2019 06:45  --------------------------------------------------------  IN:    heparin  Infusion.: 375 mL    Oral Fluid: 1860 mL  Total IN: 2235 mL    OUT:    Voided: 1250 mL  Total OUT: 1250 mL    Total NET: 985 mL          Lab Data                        9.5    6.12  )-----------( 267      ( 10 Paul 2019 05:47 )             30.2     06-10    142  |  108  |  13  ----------------------------<  139<H>  3.8   |  28  |  1.30    Ca    8.4<L>      10 Paul 2019 05:47  Mg     2.0     06-10              Review of Systems	      Objective     Physical Examination    heart s1s2  lung dec BS      Pertinent Lab findings & Imaging      Ale:  NO   Adequate UO     I&O's Detail    08 Jun 2019 07:01  -  09 Jun 2019 07:00  --------------------------------------------------------  IN:    heparin  Infusion.: 375 mL    Oral Fluid: 660 mL  Total IN: 1035 mL    OUT:    Voided: 700 mL  Total OUT: 700 mL    Total NET: 335 mL      09 Jun 2019 07:01  -  10 Paul 2019 06:45  --------------------------------------------------------  IN:    heparin  Infusion.: 375 mL    Oral Fluid: 1860 mL  Total IN: 2235 mL    OUT:    Voided: 1250 mL  Total OUT: 1250 mL    Total NET: 985 mL               Discussed with:     Cultures:	        Radiology

## 2019-06-10 NOTE — PROGRESS NOTE ADULT - PROBLEM SELECTOR PLAN 2
-patient was recently discharged from hospital for perforated ulcer  stable  -continue 40mg pantoprazole BID  - continue sulcrafate daily  -monitor hgb   - risk/benefit of  heparin drip with recent perforation of ulcer, treat PE monitor for signs of bleeding  - check FOBT

## 2019-06-10 NOTE — PHYSICAL THERAPY INITIAL EVALUATION ADULT - LEVEL OF INDEPENDENCE: GAIT, REHAB EVAL
Problem: Physical Therapy Goal  Goal: Physical Therapy Goal  Outcome: Outcome(s) achieved Date Met: 12/10/17  No goals set       contact guard/minimum assist (75% patients effort)

## 2019-06-10 NOTE — PROGRESS NOTE ADULT - ASSESSMENT
48 year old male with PMH of afib, NSTEMI, osteomyelitis,  HTN and DM2, who presented to ED with chest pressure, admitted for management of PE. Patient was recently was admitted to [place] for perforated Antral Gastric Ulcer, s/p Emergent Exp-lap Omentopexy and plication 5/11. Found to have new onset afib with elevated troponin post-operatively indicative of NSTEMI, started on Amiodarone and converted to sinus rhythm, left foot osteo s/p debridement OR 5/28    Cardio consulted for chest pain, found to have PE     PE (RUL subsegmental)  - C/W heparin gtt with eventual switching to NOAC.  Will defer to Pulm  - Target aPTT = 58 - 99 seconds.   - Venous USD negative.  - Monitor for s/s bleeding  - Continue to monitor on tele    Afib  - He went into Afib post op last admission requiring Amio load  - It was short-lived that it was decided he did not need long term AC, with plans to do an outpatient event monitor  - He remained in NSR throughout his stay prior to discharge  - Now, maintaining SB to 40s-50's on monitor, though asymptomatic and hemodynamically stable.  Continue lower dose of Amiodarone 100 mg daily   - Had NSVT yesterday but no further episodes of NSVT   - Monitor electrolytes, replete to keep K>4 and Mag>2     NSTEMI vs demand ischemia  - He had elevated troponins post op from his recent admission, though all his CPK's were negative.  Plan was outpatient ischemic eval  - TTE then showed EF 65% with no segmental wall motion abnormality  - EKG unchanged from admission, no acute ST changes.  His cardiac enzymes this admission are all negative  - Continue 81 mg daily  - Continue statin  - Hold ACEI  - Unable to start BB secondary to persistent bradycardia    Edema  - b/l pitting LE edema L>R may be chronic.  He has no evidence of volume overload apart from his edema.  Pro-BNP = 614  - s/p Lasix 40 mg IV x 1 yestrerday.  Start 40 mg PO daily today    HTN  - BP well controlled, not on any BP medications  - monitor vitals closely, can continue amiodarone, atorvastatin    DM2  - Per Primary    - Other cardiovascular workup will depend on clinical course. All other workup per primary team.  - Will follow.    Henrietta Kim [facility]  Cardiology 48 year old male with PMH of afib, NSTEMI, osteomyelitis,  HTN and DM2, who presented to ED with chest pressure, admitted for management of PE. Patient was recently was admitted to [place] for perforated Antral Gastric Ulcer, s/p Emergent Exp-lap Omentopexy and plication 5/11. Found to have new onset afib with elevated troponin post-operatively indicative of NSTEMI, started on Amiodarone and converted to sinus rhythm, left foot osteo s/p debridement OR 5/28    Cardio consulted for chest pain, found to have PE     PE (RUL subsegmental)  - Still on Heparin drip with target aPTT = 58 - 99 seconds.  Discussed with Pulm and GI, okay to switch to NOAC today.  - Venous USD negative.  - Monitor for s/s bleeding  - Continue to monitor on tele    Afib  - He went into Afib post op last admission requiring Amio load  - It was short-lived that it was decided he did not need long term AC, with plans to do an outpatient event monitor  - He remained in NSR throughout his stay prior to discharge  - Now, maintaining SB to 40s-50's on monitor, though asymptomatic and hemodynamically stable.  Continue lower dose of Amiodarone 100 mg daily   - Had NSVT yesterday but no further episodes of NSVT   - Monitor electrolytes, replete to keep K>4 and Mag>2     NSTEMI vs demand ischemia  - He had elevated troponins post op from his recent admission, though all his CPK's were negative.  Plan was outpatient ischemic eval  - TTE then showed EF 65% with no segmental wall motion abnormality  - EKG unchanged from admission, no acute ST changes.  His cardiac enzymes this admission are all negative  - Continue 81 mg daily  - Continue statin  - Hold ACEI  - Unable to start BB secondary to persistent bradycardia    Edema  - b/l pitting LE edema L>R may be chronic.  He has no evidence of volume overload apart from his edema.  Pro-BNP = 614  - s/p Lasix 40 mg IV x 1 yestrerday.  Start 40 mg PO daily today    HTN  - BP well controlled, not on any BP medications  - monitor vitals closely, can continue amiodarone, atorvastatin    DM2  - Per Primary    - Other cardiovascular workup will depend on clinical course. All other workup per primary team.  - Will follow.    Henrietta Kim [facility]  Cardiology 48 year old male with PMH of afib, NSTEMI, osteomyelitis,  HTN and DM2, who presented to ED with chest pressure, admitted for management of PE. Patient was recently was admitted to [place] for perforated Antral Gastric Ulcer, s/p Emergent Exp-lap Omentopexy and plication 5/11. Found to have new onset afib with elevated troponin post-operatively indicative of NSTEMI, started on Amiodarone and converted to sinus rhythm, left foot osteo s/p debridement OR 5/28    Cardio consulted for chest pain, found to have PE     PE (RUL subsegmental)  - Still on Heparin drip with target aPTT = 58 - 99 seconds.  Discussed with Pulm and GI.  GI to confirm  - Venous USD negative.  - Monitor for s/s bleeding  - Continue to monitor on tele    Afib  - He went into Afib post op last admission requiring Amio load  - It was short-lived that it was decided he did not need long term AC, with plans to do an outpatient event monitor  - He remained in NSR throughout his stay prior to discharge  - Now, maintaining SB to 40s-50's on monitor, though asymptomatic and hemodynamically stable.  Continue lower dose of Amiodarone 100 mg daily   - Had NSVT yesterday but no further episodes of NSVT   - Monitor electrolytes, replete to keep K>4 and Mag>2     NSTEMI vs demand ischemia  - He had elevated troponins post op from his recent admission, though all his CPK's were negative.  Plan was outpatient ischemic eval  - TTE then showed EF 65% with no segmental wall motion abnormality  - EKG unchanged from admission, no acute ST changes.  His cardiac enzymes this admission are all negative  - Continue 81 mg daily  - Continue statin  - Hold ACEI  - Unable to start BB secondary to persistent bradycardia    Edema  - b/l pitting LE edema L>R may be chronic.  He has no evidence of volume overload apart from his edema.  Pro-BNP = 614  - s/p Lasix 40 mg IV x 1 yestrerday.  Start 40 mg PO daily today    HTN  - BP well controlled, not on any BP medications  - monitor vitals closely, can continue amiodarone, atorvastatin    DM2  - Per Primary    - Other cardiovascular workup will depend on clinical course. All other workup per primary team.  - Will follow.    Henrietta Kim [facility]  Cardiology 48 year old male with PMH of afib, NSTEMI, osteomyelitis,  HTN and DM2, who presented to ED with chest pressure, admitted for management of PE. Patient was recently was admitted to [place] for perforated Antral Gastric Ulcer, s/p Emergent Exp-lap Omentopexy and plication 5/11. Found to have new onset afib with elevated troponin post-operatively indicative of NSTEMI, started on Amiodarone and converted to sinus rhythm, left foot osteo s/p debridement OR 5/28    Cardio consulted for chest pain, found to have PE     PE (RUL subsegmental)  - Still on Heparin drip with target aPTT = 58 - 99 seconds.  Discussed with Pulm and GI.  GI to confirm that starting NOAC is ok from an anemia standpoint.  I would start Eliquis 10 bid for seven days of OK with GI  - Venous USD negative.  - Monitor for s/s bleeding  - Continue to monitor on tele    Afib  - He went into Afib post op last admission requiring Amio load  - It was short-lived that it was decided he did not need long term AC, with plans to do an outpatient event monitor  - He remained in NSR throughout his stay prior to discharge  - Now, maintaining SB to 40s-50's on monitor, though asymptomatic and hemodynamically stable.  Continue lower dose of Amiodarone 100 mg daily   - Had NSVT yesterday but no further episodes of NSVT   - Monitor electrolytes, replete to keep K>4 and Mag>2     NSTEMI vs demand ischemia  - He had elevated troponins post op from his recent admission, though all his CPK's were negative.  Plan was outpatient ischemic eval  - TTE then showed EF 65% with no segmental wall motion abnormality  - EKG unchanged from admission, no acute ST changes.  His cardiac enzymes this admission are all negative  - Continue 81 mg daily  - Continue statin  - Hold ACEI  - Unable to start BB secondary to persistent bradycardia    Edema  - b/l pitting LE edema L>R may be chronic.  He has no evidence of volume overload apart from his edema.  Pro-BNP = 614  - s/p Lasix 40 mg IV x 1 yestrerday.  Start 40 mg PO daily today    HTN  - BP well controlled, not on any BP medications  - monitor vitals closely, can continue amiodarone, atorvastatin    DM2  - Per Primary    - Other cardiovascular workup will depend on clinical course. All other workup per primary team.  - Will follow.    Henrietta Kim [facility]  Cardiology 48 year old male with PMH of afib, NSTEMI, osteomyelitis,  HTN and DM2, who presented to ED with chest pressure, admitted for management of PE. Patient was recently was admitted to [place] for perforated Antral Gastric Ulcer, s/p Emergent Exp-lap Omentopexy and plication 5/11. Found to have new onset afib with elevated troponin post-operatively indicative of NSTEMI, started on Amiodarone and converted to sinus rhythm, left foot osteo s/p debridement OR 5/28    Cardio consulted for chest pain, found to have PE     PE (RUL subsegmental)  - Still on Heparin drip with target aPTT = 58 - 99 seconds.  Discussed with Pulm and GI.  GI to confirmed that okay to start NOAC.  Will start on Xarelto tonight  - Venous USD negative.  - Monitor for s/s bleeding  - Continue to monitor on tele    Afib  - He went into Afib post op last admission requiring Amio load  - It was short-lived that it was decided he did not need long term AC, with plans to do an outpatient event monitor  - He remained in NSR throughout his stay prior to discharge  - Now, maintaining SB to 40s-50's on monitor, though asymptomatic and hemodynamically stable.  Continue lower dose of Amiodarone 100 mg daily   - Had NSVT yesterday but no further episodes of NSVT   - Monitor electrolytes, replete to keep K>4 and Mag>2     NSTEMI vs demand ischemia  - He had elevated troponins post op from his recent admission, though all his CPK's were negative.  Plan was outpatient ischemic eval  - TTE then showed EF 65% with no segmental wall motion abnormality  - EKG unchanged from admission, no acute ST changes.  His cardiac enzymes this admission are all negative  - Continue 81 mg daily  - Continue statin  - Hold ACEI  - Unable to start BB secondary to persistent bradycardia    Edema  - b/l pitting LE edema L>R may be chronic.  He has no evidence of volume overload apart from his edema.  Pro-BNP = 614  - s/p Lasix 40 mg IV x 1 yestrerday.  Start 40 mg PO daily today    HTN  - BP well controlled, not on any BP medications  - monitor vitals closely, can continue amiodarone, atorvastatin    DM2  - Per Primary    - Other cardiovascular workup will depend on clinical course. All other workup per primary team.  - Will follow.    Henrietta Kim [facility]  Cardiology

## 2019-06-10 NOTE — PROGRESS NOTE ADULT - SUBJECTIVE AND OBJECTIVE BOX
Patient is a 48y old  Male who presents with a chief complaint of PE (10 Paul 2019 06:45)      INTERVAL HPI/OVERNIGHT EVENTS:    T(C): 36.3 (06-10-19 @ 04:09), Max: 36.9 (06-09-19 @ 16:16)  HR: 50 (06-10-19 @ 04:09) (47 - 57)  BP: 106/66 (06-10-19 @ 04:09) (106/66 - 123/74)  RR: 18 (06-10-19 @ 04:09) (18 - 20)  SpO2: 97% (06-10-19 @ 04:09) (97% - 100%)  Wt(kg): --    I&O's Summary    09 Jun 2019 07:01  -  10 Paul 2019 07:00  --------------------------------------------------------  IN: 2235 mL / OUT: 1250 mL / NET: 985 mL        LABS:                        9.5    6.12  )-----------( 267      ( 10 Paul 2019 05:47 )             30.2     06-10    142  |  108  |  13  ----------------------------<  139<H>  3.8   |  28  |  1.30    Ca    8.4<L>      10 Paul 2019 05:47  Mg     2.0     06-10      PT/INR - ( 10 Paul 2019 05:47 )   PT: 13.7 sec;   INR: 1.20 ratio         PTT - ( 10 Paul 2019 05:47 )  PTT:88.5 sec  CAPILLARY BLOOD GLUCOSE      POCT Blood Glucose.: 232 mg/dL (09 Jun 2019 21:29)  POCT Blood Glucose.: 144 mg/dL (09 Jun 2019 17:05)  POCT Blood Glucose.: 157 mg/dL (09 Jun 2019 12:08)  POCT Blood Glucose.: 129 mg/dL (09 Jun 2019 08:18)          Culture - Urine (collected 06 Jun 2019 20:37)  Source: .Urine Clean Catch (Midstream)  Final Report (07 Jun 2019 22:22):    >=3 organisms. Probable collection contamination.    Culture - Blood (collected 06 Jun 2019 09:59)  Source: .Blood Blood  Preliminary Report (07 Jun 2019 10:01):    No growth to date.    Culture - Blood (collected 06 Jun 2019 09:58)  Source: .Blood Blood  Preliminary Report (07 Jun 2019 10:01):    No growth to date.         MEDICATIONS  (STANDING):  amiodarone    Tablet 100 milliGRAM(s) Oral daily  aspirin enteric coated 81 milliGRAM(s) Oral daily  atorvastatin 80 milliGRAM(s) Oral at bedtime  dextrose 5%. 1000 milliLiter(s) (50 mL/Hr) IV Continuous <Continuous>  dextrose 50% Injectable 12.5 Gram(s) IV Push once  dextrose 50% Injectable 25 Gram(s) IV Push once  dextrose 50% Injectable 25 Gram(s) IV Push once  docusate sodium 100 milliGRAM(s) Oral three times a day  furosemide    Tablet 40 milliGRAM(s) Oral daily  heparin  Infusion.  Unit(s)/Hr (15 mL/Hr) IV Continuous <Continuous>  insulin glargine Injectable (LANTUS) 10 Unit(s) SubCutaneous at bedtime  insulin lispro (HumaLOG) corrective regimen sliding scale   SubCutaneous three times a day before meals  insulin lispro (HumaLOG) corrective regimen sliding scale   SubCutaneous at bedtime  insulin lispro Injectable (HumaLOG) 3 Unit(s) SubCutaneous three times a day before meals  nicotine - 21 mG/24Hr(s) Patch 1 patch Transdermal daily  pantoprazole    Tablet 40 milliGRAM(s) Oral two times a day  polyethylene glycol 3350 17 Gram(s) Oral at bedtime  senna 2 Tablet(s) Oral at bedtime  sucralfate 1 Gram(s) Oral two times a day  tamsulosin 0.4 milliGRAM(s) Oral at bedtime    MEDICATIONS  (PRN):  ALBUTerol    90 MICROgram(s) HFA Inhaler 2 Puff(s) Inhalation every 6 hours PRN Shortness of Breath and/or Wheezing  dextrose 40% Gel 15 Gram(s) Oral once PRN Blood Glucose LESS THAN 70 milliGRAM(s)/deciliter  glucagon  Injectable 1 milliGRAM(s) IntraMuscular once PRN Glucose LESS THAN 70 milligrams/deciliter  heparin  Injectable 6500 Unit(s) IV Push every 6 hours PRN For aPTT less than 40  heparin  Injectable 3000 Unit(s) IV Push every 6 hours PRN For aPTT between 40 - 57      REVIEW OF SYSTEMS:  CONSTITUTIONAL: No fever, weight loss, or fatigue  EYES: No eye pain, visual disturbances, or discharge  ENMT: No difficulty hearing, tinnitus, vertigo; No sinus or throat pain  NECK: No pain or stiffness  RESPIRATORY: No cough, wheezing, chills or hemoptysis; No shortness of breath  CARDIOVASCULAR: No chest pain, palpitations, dizziness, or leg swelling  GASTROINTESTINAL: No abdominal or epigastric pain. No nausea, vomiting, or hematemesis; No diarrhea or constipation; No melena or hematochezia  GENITOURINARY: No dysuria, frequency, hematuria, or incontinence  NEUROLOGICAL: No headaches, memory loss, loss of strength, numbness, or tremors  SKIN: No itching, burning, rashes, or lesions   LYMPH NODES: No enlarged glands  ENDOCRINE: No heat or cold intolerance; No hair loss  MUSCULOSKELETAL: No joint pain or swelling; No muscle, back, or extremity pain  PSYCHIATRIC: No depression, anxiety, mood swings, or difficulty sleeping  HEME/LYMPH: No easy bruising, or bleeding gums  ALLERGY AND IMMUNOLOGIC: No hives or eczema    RADIOLOGY & ADDITIONAL TESTS:    Imaging Personally Reviewed:  [ ] YES  [ ] NO    Consultant(s) Notes Reviewed:  [ ] YES  [ ] NO    PHYSICAL EXAM:  GENERAL: NAD, well-groomed, well-developed  HEAD: Atraumatic, Normocephalic  EYES: EOMI, PERRLA, conjunctiva and sclera clear  ENMT: No tonsillar erythema, exudates, or enlargement; Moist mucous membranes, Good dentition, No lesions  NECK: Supple, No JVD, Normal thyroid  NERVOUS SYSTEM: Alert & Oriented X3, Good concentration; Motor strength 5/5 B/L upper and lower extremities; DTRs 2+ intact and symmetric  CHEST/LUNG: Clear to percussion bilaterally; No rales, rhonchi, wheezing, or rubs  HEART: Regular rate and rhythm; No murmurs, rubs, or gallops  ABDOMEN: Soft, Nontender, Nondistended; Bowel sounds present  EXTREMITIES: 2+ Peripheral Pulses, No clubbing, cyanosis, or edema  LYMPH: No lymphadenopathy noted  SKIN: No rashes or lesions    Care Discussed with Consultants/Other Providers [ ] YES  [ ] NO Patient is a 48y old  Male who presents with a chief complaint of PE (10 Paul 2019 06:45)      INTERVAL HPI/OVERNIGHT EVENTS:  NSVT  on June 9th. No further events. offers no complaints. Feeling well. Would like to go to rehabilitation rather than home    T(C): 36.3 (06-10-19 @ 04:09), Max: 36.9 (06-09-19 @ 16:16)  HR: 50 (06-10-19 @ 04:09) (47 - 57)  BP: 106/66 (06-10-19 @ 04:09) (106/66 - 123/74)  RR: 18 (06-10-19 @ 04:09) (18 - 20)  SpO2: 97% (06-10-19 @ 04:09) (97% - 100%)  Wt(kg): --    I&O's Summary    09 Jun 2019 07:01  -  10 Paul 2019 07:00  --------------------------------------------------------  IN: 2235 mL / OUT: 1250 mL / NET: 985 mL        LABS:                        9.5    6.12  )-----------( 267      ( 10 Paul 2019 05:47 )             30.2     06-10    142  |  108  |  13  ----------------------------<  139<H>  3.8   |  28  |  1.30    Ca    8.4<L>      10 Paul 2019 05:47  Mg     2.0     06-10      PT/INR - ( 10 Paul 2019 05:47 )   PT: 13.7 sec;   INR: 1.20 ratio         PTT - ( 10 Paul 2019 05:47 )  PTT:88.5 sec  CAPILLARY BLOOD GLUCOSE      POCT Blood Glucose.: 232 mg/dL (09 Jun 2019 21:29)  POCT Blood Glucose.: 144 mg/dL (09 Jun 2019 17:05)  POCT Blood Glucose.: 157 mg/dL (09 Jun 2019 12:08)  POCT Blood Glucose.: 129 mg/dL (09 Jun 2019 08:18)          Culture - Urine (collected 06 Jun 2019 20:37)  Source: .Urine Clean Catch (Midstream)  Final Report (07 Jun 2019 22:22):    >=3 organisms. Probable collection contamination.    Culture - Blood (collected 06 Jun 2019 09:59)  Source: .Blood Blood  Preliminary Report (07 Jun 2019 10:01):    No growth to date.    Culture - Blood (collected 06 Jun 2019 09:58)  Source: .Blood Blood  Preliminary Report (07 Jun 2019 10:01):    No growth to date.         MEDICATIONS  (STANDING):  amiodarone    Tablet 100 milliGRAM(s) Oral daily  aspirin enteric coated 81 milliGRAM(s) Oral daily  atorvastatin 80 milliGRAM(s) Oral at bedtime  dextrose 5%. 1000 milliLiter(s) (50 mL/Hr) IV Continuous <Continuous>  dextrose 50% Injectable 12.5 Gram(s) IV Push once  dextrose 50% Injectable 25 Gram(s) IV Push once  dextrose 50% Injectable 25 Gram(s) IV Push once  docusate sodium 100 milliGRAM(s) Oral three times a day  furosemide    Tablet 40 milliGRAM(s) Oral daily  heparin  Infusion.  Unit(s)/Hr (15 mL/Hr) IV Continuous <Continuous>  insulin glargine Injectable (LANTUS) 10 Unit(s) SubCutaneous at bedtime  insulin lispro (HumaLOG) corrective regimen sliding scale   SubCutaneous three times a day before meals  insulin lispro (HumaLOG) corrective regimen sliding scale   SubCutaneous at bedtime  insulin lispro Injectable (HumaLOG) 3 Unit(s) SubCutaneous three times a day before meals  nicotine - 21 mG/24Hr(s) Patch 1 patch Transdermal daily  pantoprazole    Tablet 40 milliGRAM(s) Oral two times a day  polyethylene glycol 3350 17 Gram(s) Oral at bedtime  senna 2 Tablet(s) Oral at bedtime  sucralfate 1 Gram(s) Oral two times a day  tamsulosin 0.4 milliGRAM(s) Oral at bedtime    MEDICATIONS  (PRN):  ALBUTerol    90 MICROgram(s) HFA Inhaler 2 Puff(s) Inhalation every 6 hours PRN Shortness of Breath and/or Wheezing  dextrose 40% Gel 15 Gram(s) Oral once PRN Blood Glucose LESS THAN 70 milliGRAM(s)/deciliter  glucagon  Injectable 1 milliGRAM(s) IntraMuscular once PRN Glucose LESS THAN 70 milligrams/deciliter  heparin  Injectable 6500 Unit(s) IV Push every 6 hours PRN For aPTT less than 40  heparin  Injectable 3000 Unit(s) IV Push every 6 hours PRN For aPTT between 40 - 57      REVIEW OF SYSTEMS:  CONSTITUTIONAL: No fever, weight loss, or fatigue  EYES: No eye pain, visual disturbances, or discharge  ENMT: No difficulty hearing, tinnitus, vertigo; No sinus or throat pain  NECK: No pain or stiffness  RESPIRATORY: No cough, wheezing, chills or hemoptysis; No shortness of breath  CARDIOVASCULAR: No chest pain, palpitations, dizziness, or leg swelling  GASTROINTESTINAL: No abdominal or epigastric pain. No nausea, vomiting, or hematemesis; No diarrhea or constipation; No melena or hematochezia  GENITOURINARY: No dysuria, frequency, hematuria, or incontinence  NEUROLOGICAL: No headaches, memory loss, loss of strength, numbness, or tremors  SKIN: No itching, burning, rashes, or lesions   LYMPH NODES: No enlarged glands  ENDOCRINE: No heat or cold intolerance; No hair loss  MUSCULOSKELETAL: No joint pain or swelling; No muscle, back, or extremity pain  PSYCHIATRIC: No depression, anxiety, mood swings, or difficulty sleeping  HEME/LYMPH: No easy bruising, or bleeding gums  ALLERGY AND IMMUNOLOGIC: No hives or eczema    RADIOLOGY & ADDITIONAL TESTS:    Imaging Personally Reviewed:  [x ] YES  [ ] NO    Consultant(s) Notes Reviewed:  [ x] YES  [ ] NO    GENERAL: NAD, well-developed  HEAD: Atraumatic, Normocephalic  EYES: EOMI, PERRLA, conjunctiva and sclera clear  ENMT: No tonsillar erythema, exudates, or enlargement; Moist mucous membranes  NECK: Supple, No JVD  NERVOUS SYSTEM: Alert & Oriented X3, Good concentration; Motor strength 5/5 B/L upper and lower extremities  CHEST/LUNG: Clear to auscultation bilaterally, No rales, rhonchi, wheezing, or rubs.  Chest pain reproduced upon palpation of the upper right area of the chest.  HEART: Regular rate and rhythm; No murmurs, rubs, or gallops  ABDOMEN: Soft, Nontender, Nondistended; Bowel sounds present  EXTREMITIES: 2+ Peripheral Pulses, 1+ pitting edema of ankles b/l, bandaged left foot with amputated left hallux with stitches clean, dry and intact      Care Discussed with Consultants/Other Providers [x ] YES  [ ] NO

## 2019-06-10 NOTE — PROGRESS NOTE ADULT - PROBLEM SELECTOR PLAN 1
seen and examined, started on LASIX this am - Cardio following -    PE, PAF, GI bleed  Anemia  GI eval noted  on Heparin for PE  out of bed as tolerated  increase activity  serial Hgb  cvs regimen and AF - rate control and BP control  tolerating room air  smoking cess ed - counseling - Bronchodilators for emphysema  will need PFT as outpatient - and CT chest in 6 months for pulm nodules follow up.

## 2019-06-10 NOTE — PROGRESS NOTE ADULT - PROBLEM SELECTOR PLAN 1
no overt gi bleed, passing brown stool  hgb low but stable  cont ppi/carafate  monitor cbc, transfuse prn  will follow

## 2019-06-10 NOTE — PHYSICAL THERAPY INITIAL EVALUATION ADULT - ADDITIONAL COMMENTS
Pt lives in private home with friend with 2 steps to enter no handrails.  Pt's bedroom is on second floor with L ascending rail.  Pt reports he was independent in all ADLs and was ambulating without a device.  Pt has just received a RW but did not use since he had returned to the hospital.

## 2019-06-10 NOTE — PROGRESS NOTE ADULT - SUBJECTIVE AND OBJECTIVE BOX
INTERVAL HPI/OVERNIGHT EVENTS:  pt seen and examined  denies n/v/d/c/abd pain  denies melena/brbpr  reports passing brown stool  eating    MEDICATIONS  (STANDING):  amiodarone    Tablet 100 milliGRAM(s) Oral daily  aspirin enteric coated 81 milliGRAM(s) Oral daily  atorvastatin 80 milliGRAM(s) Oral at bedtime  dextrose 5%. 1000 milliLiter(s) (50 mL/Hr) IV Continuous <Continuous>  dextrose 50% Injectable 12.5 Gram(s) IV Push once  dextrose 50% Injectable 25 Gram(s) IV Push once  dextrose 50% Injectable 25 Gram(s) IV Push once  docusate sodium 100 milliGRAM(s) Oral three times a day  furosemide    Tablet 40 milliGRAM(s) Oral daily  heparin  Infusion.  Unit(s)/Hr (15 mL/Hr) IV Continuous <Continuous>  insulin glargine Injectable (LANTUS) 10 Unit(s) SubCutaneous at bedtime  insulin lispro (HumaLOG) corrective regimen sliding scale   SubCutaneous three times a day before meals  insulin lispro (HumaLOG) corrective regimen sliding scale   SubCutaneous at bedtime  insulin lispro Injectable (HumaLOG) 3 Unit(s) SubCutaneous three times a day before meals  nicotine - 21 mG/24Hr(s) Patch 1 patch Transdermal daily  pantoprazole    Tablet 40 milliGRAM(s) Oral two times a day  polyethylene glycol 3350 17 Gram(s) Oral at bedtime  senna 2 Tablet(s) Oral at bedtime  sucralfate 1 Gram(s) Oral two times a day  tamsulosin 0.4 milliGRAM(s) Oral at bedtime    MEDICATIONS  (PRN):  ALBUTerol    90 MICROgram(s) HFA Inhaler 2 Puff(s) Inhalation every 6 hours PRN Shortness of Breath and/or Wheezing  dextrose 40% Gel 15 Gram(s) Oral once PRN Blood Glucose LESS THAN 70 milliGRAM(s)/deciliter  glucagon  Injectable 1 milliGRAM(s) IntraMuscular once PRN Glucose LESS THAN 70 milligrams/deciliter  heparin  Injectable 6500 Unit(s) IV Push every 6 hours PRN For aPTT less than 40  heparin  Injectable 3000 Unit(s) IV Push every 6 hours PRN For aPTT between 40 - 57      Allergies    fish (Hives)  No Known Drug Allergies    Intolerances        Review of Systems:    General:  No wt loss, fevers, chills, night sweats, fatigue   Eyes:  Good vision, no reported pain  ENT:  No sore throat, pain, runny nose, dysphagia  CV:  No pain, palpitations, hypo/hypertension  Resp:  No dyspnea, cough, tachypnea, wheezing  GI:  No pain, No nausea, No vomiting, No diarrhea, No constipation, No weight loss, No fever, No pruritis, No rectal bleeding, No melena, No dysphagia  :  No pain, bleeding, incontinence, nocturia  Muscle:  No pain, weakness  Neuro:  No weakness, tingling, memory problems  Psych:  No fatigue, insomnia, mood problems, depression  Endocrine:  No polyuria, polydypsia, cold/heat intolerance  Heme:  No petechiae, ecchymosis, easy bruisability  Skin:  No rash, tattoos, scars, edema      Vital Signs Last 24 Hrs  T(C): 36.7 (10 Paul 2019 07:59), Max: 36.9 (09 Jun 2019 16:16)  T(F): 98.1 (10 Paul 2019 07:59), Max: 98.5 (09 Jun 2019 16:16)  HR: 59 (10 Paul 2019 07:59) (47 - 59)  BP: 101/67 (10 Paul 2019 07:59) (101/67 - 123/74)  BP(mean): --  RR: 18 (10 Paul 2019 07:59) (18 - 20)  SpO2: 96% (10 Paul 2019 07:59) (96% - 100%)    PHYSICAL EXAM:    Constitutional: NAD  HEENT: ncat  Neck: No LAD  Respiratory: dec bs  Cardiovascular: S1 and S2, RRR  Gastrointestinal: soft nt mild dt  Extremities: edema  Vascular: 2+ peripheral pulses  Neurological: A/O x 3  Skin: No rashes      LABS:                        9.5    6.12  )-----------( 267      ( 10 Paul 2019 05:47 )             30.2     06-10    142  |  108  |  13  ----------------------------<  139<H>  3.8   |  28  |  1.30    Ca    8.4<L>      10 Paul 2019 05:47  Mg     2.0     06-10      PT/INR - ( 10 Paul 2019 05:47 )   PT: 13.7 sec;   INR: 1.20 ratio         PTT - ( 10 Paul 2019 05:47 )  PTT:88.5 sec      RADIOLOGY & ADDITIONAL TESTS:

## 2019-06-10 NOTE — PROGRESS NOTE ADULT - PROBLEM SELECTOR PLAN 3
- Persistent bradycardia in 50's ,short 2 min episodes of high 40's, likely due to increased vagal tone  - NSTEMI vs demand ischemia on in May 2019 on last admission, echo wnl, EKG unchanged  since last admission  -- Continue 81 mg daily  - Continue statin

## 2019-06-10 NOTE — PROGRESS NOTE ADULT - PROBLEM SELECTOR PLAN 5
-  Afib post op for gastric plication for perforated ulcer, last admission requiring Amio load  - Afib short-lived that it was decided he did not need long term AC, with plans to do an outpatient event monitor, NSR throughout his stay prior to discharge  - Decreased Amiodarone to 100 mg daily  - No further episodes of NSVT

## 2019-06-11 LAB
ANION GAP SERPL CALC-SCNC: 5 MMOL/L — SIGNIFICANT CHANGE UP (ref 5–17)
APTT BLD: 36 SEC — SIGNIFICANT CHANGE UP (ref 27.5–36.3)
BUN SERPL-MCNC: 17 MG/DL — SIGNIFICANT CHANGE UP (ref 7–23)
CALCIUM SERPL-MCNC: 8.7 MG/DL — SIGNIFICANT CHANGE UP (ref 8.5–10.1)
CHLORIDE SERPL-SCNC: 106 MMOL/L — SIGNIFICANT CHANGE UP (ref 96–108)
CO2 SERPL-SCNC: 31 MMOL/L — SIGNIFICANT CHANGE UP (ref 22–31)
CREAT SERPL-MCNC: 1.5 MG/DL — HIGH (ref 0.5–1.3)
CULTURE RESULTS: SIGNIFICANT CHANGE UP
CULTURE RESULTS: SIGNIFICANT CHANGE UP
GLUCOSE SERPL-MCNC: 119 MG/DL — HIGH (ref 70–99)
HCT VFR BLD CALC: 30.1 % — LOW (ref 39–50)
HGB BLD-MCNC: 9.4 G/DL — LOW (ref 13–17)
MCHC RBC-ENTMCNC: 30.7 PG — SIGNIFICANT CHANGE UP (ref 27–34)
MCHC RBC-ENTMCNC: 31.2 GM/DL — LOW (ref 32–36)
MCV RBC AUTO: 98.4 FL — SIGNIFICANT CHANGE UP (ref 80–100)
NRBC # BLD: 0 /100 WBCS — SIGNIFICANT CHANGE UP (ref 0–0)
PLATELET # BLD AUTO: 261 K/UL — SIGNIFICANT CHANGE UP (ref 150–400)
POTASSIUM SERPL-MCNC: 4.1 MMOL/L — SIGNIFICANT CHANGE UP (ref 3.5–5.3)
POTASSIUM SERPL-SCNC: 4.1 MMOL/L — SIGNIFICANT CHANGE UP (ref 3.5–5.3)
RBC # BLD: 3.06 M/UL — LOW (ref 4.2–5.8)
RBC # FLD: 14.5 % — SIGNIFICANT CHANGE UP (ref 10.3–14.5)
SODIUM SERPL-SCNC: 142 MMOL/L — SIGNIFICANT CHANGE UP (ref 135–145)
SPECIMEN SOURCE: SIGNIFICANT CHANGE UP
SPECIMEN SOURCE: SIGNIFICANT CHANGE UP
WBC # BLD: 6.81 K/UL — SIGNIFICANT CHANGE UP (ref 3.8–10.5)
WBC # FLD AUTO: 6.81 K/UL — SIGNIFICANT CHANGE UP (ref 3.8–10.5)

## 2019-06-11 PROCEDURE — 99232 SBSQ HOSP IP/OBS MODERATE 35: CPT

## 2019-06-11 RX ORDER — ONDANSETRON 8 MG/1
4 TABLET, FILM COATED ORAL ONCE
Refills: 0 | Status: COMPLETED | OUTPATIENT
Start: 2019-06-11 | End: 2019-06-11

## 2019-06-11 RX ORDER — OXYCODONE AND ACETAMINOPHEN 5; 325 MG/1; MG/1
1 TABLET ORAL ONCE
Refills: 0 | Status: DISCONTINUED | OUTPATIENT
Start: 2019-06-11 | End: 2019-06-11

## 2019-06-11 RX ADMIN — Medication 100 MILLIGRAM(S): at 05:57

## 2019-06-11 RX ADMIN — Medication 1 PATCH: at 11:07

## 2019-06-11 RX ADMIN — OXYCODONE AND ACETAMINOPHEN 1 TABLET(S): 5; 325 TABLET ORAL at 00:04

## 2019-06-11 RX ADMIN — Medication 100 MILLIGRAM(S): at 14:16

## 2019-06-11 RX ADMIN — Medication 3 UNIT(S): at 08:30

## 2019-06-11 RX ADMIN — Medication 1 GRAM(S): at 17:52

## 2019-06-11 RX ADMIN — Medication 100 MILLIGRAM(S): at 21:47

## 2019-06-11 RX ADMIN — OXYCODONE AND ACETAMINOPHEN 1 TABLET(S): 5; 325 TABLET ORAL at 22:24

## 2019-06-11 RX ADMIN — OXYCODONE AND ACETAMINOPHEN 1 TABLET(S): 5; 325 TABLET ORAL at 00:45

## 2019-06-11 RX ADMIN — TAMSULOSIN HYDROCHLORIDE 0.4 MILLIGRAM(S): 0.4 CAPSULE ORAL at 21:47

## 2019-06-11 RX ADMIN — APIXABAN 10 MILLIGRAM(S): 2.5 TABLET, FILM COATED ORAL at 17:52

## 2019-06-11 RX ADMIN — INSULIN GLARGINE 10 UNIT(S): 100 INJECTION, SOLUTION SUBCUTANEOUS at 21:48

## 2019-06-11 RX ADMIN — OXYCODONE AND ACETAMINOPHEN 1 TABLET(S): 5; 325 TABLET ORAL at 23:10

## 2019-06-11 RX ADMIN — Medication 3 UNIT(S): at 12:28

## 2019-06-11 RX ADMIN — APIXABAN 10 MILLIGRAM(S): 2.5 TABLET, FILM COATED ORAL at 05:58

## 2019-06-11 RX ADMIN — Medication 40 MILLIGRAM(S): at 05:58

## 2019-06-11 RX ADMIN — Medication 2: at 21:47

## 2019-06-11 RX ADMIN — SENNA PLUS 2 TABLET(S): 8.6 TABLET ORAL at 21:47

## 2019-06-11 RX ADMIN — ATORVASTATIN CALCIUM 80 MILLIGRAM(S): 80 TABLET, FILM COATED ORAL at 21:47

## 2019-06-11 RX ADMIN — ONDANSETRON 4 MILLIGRAM(S): 8 TABLET, FILM COATED ORAL at 00:04

## 2019-06-11 RX ADMIN — Medication 3 UNIT(S): at 17:32

## 2019-06-11 RX ADMIN — Medication 1 PATCH: at 21:51

## 2019-06-11 RX ADMIN — Medication 1: at 17:32

## 2019-06-11 RX ADMIN — Medication 81 MILLIGRAM(S): at 11:07

## 2019-06-11 RX ADMIN — PANTOPRAZOLE SODIUM 40 MILLIGRAM(S): 20 TABLET, DELAYED RELEASE ORAL at 17:52

## 2019-06-11 RX ADMIN — Medication 1 PATCH: at 07:28

## 2019-06-11 RX ADMIN — Medication 1 GRAM(S): at 05:57

## 2019-06-11 RX ADMIN — PANTOPRAZOLE SODIUM 40 MILLIGRAM(S): 20 TABLET, DELAYED RELEASE ORAL at 05:58

## 2019-06-11 RX ADMIN — AMIODARONE HYDROCHLORIDE 100 MILLIGRAM(S): 400 TABLET ORAL at 05:57

## 2019-06-11 RX ADMIN — ONDANSETRON 4 MILLIGRAM(S): 8 TABLET, FILM COATED ORAL at 22:24

## 2019-06-11 NOTE — PROGRESS NOTE ADULT - SUBJECTIVE AND OBJECTIVE BOX
INTERVAL HPI/OVERNIGHT EVENTS:  pt seen and examined  denies n/v/abd pain/melena/brbpr    MEDICATIONS  (STANDING):  amiodarone    Tablet 100 milliGRAM(s) Oral daily  apixaban 10 milliGRAM(s) Oral every 12 hours  aspirin enteric coated 81 milliGRAM(s) Oral daily  atorvastatin 80 milliGRAM(s) Oral at bedtime  dextrose 5%. 1000 milliLiter(s) (50 mL/Hr) IV Continuous <Continuous>  dextrose 50% Injectable 12.5 Gram(s) IV Push once  dextrose 50% Injectable 25 Gram(s) IV Push once  dextrose 50% Injectable 25 Gram(s) IV Push once  docusate sodium 100 milliGRAM(s) Oral three times a day  insulin glargine Injectable (LANTUS) 10 Unit(s) SubCutaneous at bedtime  insulin lispro (HumaLOG) corrective regimen sliding scale   SubCutaneous three times a day before meals  insulin lispro (HumaLOG) corrective regimen sliding scale   SubCutaneous at bedtime  insulin lispro Injectable (HumaLOG) 3 Unit(s) SubCutaneous three times a day before meals  nicotine - 21 mG/24Hr(s) Patch 1 patch Transdermal daily  pantoprazole    Tablet 40 milliGRAM(s) Oral two times a day  polyethylene glycol 3350 17 Gram(s) Oral at bedtime  senna 2 Tablet(s) Oral at bedtime  sucralfate 1 Gram(s) Oral two times a day  tamsulosin 0.4 milliGRAM(s) Oral at bedtime    MEDICATIONS  (PRN):  ALBUTerol    90 MICROgram(s) HFA Inhaler 2 Puff(s) Inhalation every 6 hours PRN Shortness of Breath and/or Wheezing  dextrose 40% Gel 15 Gram(s) Oral once PRN Blood Glucose LESS THAN 70 milliGRAM(s)/deciliter  glucagon  Injectable 1 milliGRAM(s) IntraMuscular once PRN Glucose LESS THAN 70 milligrams/deciliter      Allergies    fish (Hives)  No Known Drug Allergies    Intolerances        Review of Systems:    General:  No wt loss, fevers, chills, night sweats, fatigue   Eyes:  Good vision, no reported pain  ENT:  No sore throat, pain, runny nose, dysphagia  CV:  No pain, palpitations, hypo/hypertension  Resp:  No dyspnea, cough, tachypnea, wheezing  GI:  No pain, No nausea, No vomiting, No diarrhea, No constipation, No weight loss, No fever, No pruritis, No rectal bleeding, No melena, No dysphagia  :  No pain, bleeding, incontinence, nocturia  Muscle:  No pain, weakness  Neuro:  No weakness, tingling, memory problems  Psych:  No fatigue, insomnia, mood problems, depression  Endocrine:  No polyuria, polydypsia, cold/heat intolerance  Heme:  No petechiae, ecchymosis, easy bruisability  Skin:  No rash, tattoos, scars, edema      Vital Signs Last 24 Hrs  T(C): 36.6 (11 Jun 2019 07:34), Max: 36.8 (10 Paul 2019 23:21)  T(F): 97.9 (11 Jun 2019 07:34), Max: 98.2 (10 Paul 2019 23:21)  HR: 56 (11 Jun 2019 07:34) (52 - 66)  BP: 134/79 (11 Jun 2019 07:34) (111/67 - 158/75)  BP(mean): --  RR: 17 (11 Jun 2019 07:34) (17 - 18)  SpO2: 93% (11 Jun 2019 07:34) (93% - 100%)    PHYSICAL EXAM:    Constitutional: NAD  HEENT: ncat  Neck: No LAD  Respiratory: dec bs  Cardiovascular: S1 and S2, RRR  Gastrointestinal: soft nt mild dt  Extremities: edema  Vascular: 2+ peripheral pulses  Neurological: A/O x 3  Skin: No rashes        LABS:                        9.4    6.81  )-----------( 261      ( 11 Jun 2019 05:57 )             30.1     06-11    142  |  106  |  17  ----------------------------<  119<H>  4.1   |  31  |  1.50<H>    Ca    8.7      11 Jun 2019 05:57  Mg     2.0     06-10      PT/INR - ( 10 Paul 2019 05:47 )   PT: 13.7 sec;   INR: 1.20 ratio         PTT - ( 11 Jun 2019 05:57 )  PTT:36.0 sec      RADIOLOGY & ADDITIONAL TESTS:

## 2019-06-11 NOTE — PROGRESS NOTE ADULT - SUBJECTIVE AND OBJECTIVE BOX
47yo male seen bedside who is post-op status post left hallux amputation and partial 1st metatarsal resection from 5/28/19.       Vital Signs Last 24 Hrs  T(C): 36.6 (11 Jun 2019 07:34), Max: 36.8 (10 Paul 2019 23:21)  T(F): 97.9 (11 Jun 2019 07:34), Max: 98.2 (10 Paul 2019 23:21)  HR: 56 (11 Jun 2019 07:34) (52 - 66)  BP: 134/79 (11 Jun 2019 07:34) (111/67 - 158/75)  BP(mean): --  RR: 17 (11 Jun 2019 07:34) (17 - 18)  SpO2: 93% (11 Jun 2019 07:34) (93% - 100%)    06-11    142  |  106  |  17  ----------------------------<  119<H>  4.1   |  31  |  1.50<H>    Ca    8.7      11 Jun 2019 05:57  Mg     2.0     06-10      CBC Full  -  ( 11 Jun 2019 05:57 )  WBC Count : 6.81 K/uL  RBC Count : 3.06 M/uL  Hemoglobin : 9.4 g/dL  Hematocrit : 30.1 %  Platelet Count - Automated : 261 K/uL  Mean Cell Volume : 98.4 fl  Mean Cell Hemoglobin : 30.7 pg  Mean Cell Hemoglobin Concentration : 31.2 gm/dL  Auto Neutrophil # : x  Auto Lymphocyte # : x  Auto Monocyte # : x  Auto Eosinophil # : x  Auto Basophil # : x  Auto Neutrophil % : x  Auto Lymphocyte % : x  Auto Monocyte % : x  Auto Eosinophil % : x  Auto Basophil % : x    Objective: left foot focused  Vasc: DP and PT palpable; cap refill time < 3 seconds to all remaining digits; temp gradient within normal limits; no edema  Derm:  -surgical wound to hallux amputation site with sutures at the proximal and distal ends intact, dehiscence noted to the middle; mild purulent drainage noted; no malodor; to subcutaneous tissue, no bone exposed  Neuro: epicritic sensation diminished to all digits  Ortho: s/p hallux and partial 1st metatarsal amputation, primarily closed

## 2019-06-11 NOTE — PROGRESS NOTE ADULT - PROBLEM SELECTOR PLAN 1
no overt gi bleed, hgb low but stable  cont ppi/carafate  monitor cbc, transfuse prn  no gi objection to ac  will follow, plan dw attg, agreeable

## 2019-06-11 NOTE — PROGRESS NOTE ADULT - SUBJECTIVE AND OBJECTIVE BOX
Date/Time Patient Seen:  		  Referring MD:   Data Reviewed	       Patient is a 48y old  Male who presents with a chief complaint of PE (11 Jun 2019 07:29)      Subjective/HPI     PAST MEDICAL & SURGICAL HISTORY:  BPH (benign prostatic hyperplasia)  Hypertension  Afib  Diabetes mellitus with no complication  No pertinent past medical history  H/O abdominal surgery  No significant past surgical history        Medication list         MEDICATIONS  (STANDING):  amiodarone    Tablet 100 milliGRAM(s) Oral daily  apixaban 10 milliGRAM(s) Oral every 12 hours  aspirin enteric coated 81 milliGRAM(s) Oral daily  atorvastatin 80 milliGRAM(s) Oral at bedtime  dextrose 5%. 1000 milliLiter(s) (50 mL/Hr) IV Continuous <Continuous>  dextrose 50% Injectable 12.5 Gram(s) IV Push once  dextrose 50% Injectable 25 Gram(s) IV Push once  dextrose 50% Injectable 25 Gram(s) IV Push once  docusate sodium 100 milliGRAM(s) Oral three times a day  furosemide    Tablet 40 milliGRAM(s) Oral daily  insulin glargine Injectable (LANTUS) 10 Unit(s) SubCutaneous at bedtime  insulin lispro (HumaLOG) corrective regimen sliding scale   SubCutaneous three times a day before meals  insulin lispro (HumaLOG) corrective regimen sliding scale   SubCutaneous at bedtime  insulin lispro Injectable (HumaLOG) 3 Unit(s) SubCutaneous three times a day before meals  nicotine - 21 mG/24Hr(s) Patch 1 patch Transdermal daily  pantoprazole    Tablet 40 milliGRAM(s) Oral two times a day  polyethylene glycol 3350 17 Gram(s) Oral at bedtime  senna 2 Tablet(s) Oral at bedtime  sucralfate 1 Gram(s) Oral two times a day  tamsulosin 0.4 milliGRAM(s) Oral at bedtime    MEDICATIONS  (PRN):  ALBUTerol    90 MICROgram(s) HFA Inhaler 2 Puff(s) Inhalation every 6 hours PRN Shortness of Breath and/or Wheezing  dextrose 40% Gel 15 Gram(s) Oral once PRN Blood Glucose LESS THAN 70 milliGRAM(s)/deciliter  glucagon  Injectable 1 milliGRAM(s) IntraMuscular once PRN Glucose LESS THAN 70 milligrams/deciliter         Vitals log        ICU Vital Signs Last 24 Hrs  T(C): 36.6 (11 Jun 2019 07:34), Max: 36.8 (10 Paul 2019 23:21)  T(F): 97.9 (11 Jun 2019 07:34), Max: 98.2 (10 Paul 2019 23:21)  HR: 56 (11 Jun 2019 07:34) (52 - 66)  BP: 134/79 (11 Jun 2019 07:34) (111/67 - 158/75)  BP(mean): --  ABP: --  ABP(mean): --  RR: 17 (11 Jun 2019 07:34) (17 - 18)  SpO2: 93% (11 Jun 2019 07:34) (93% - 100%)           Input and Output:  I&O's Detail    10 Paul 2019 07:01  -  11 Jun 2019 07:00  --------------------------------------------------------  IN:    Oral Fluid: 200 mL  Total IN: 200 mL    OUT:    Voided: 1000 mL  Total OUT: 1000 mL    Total NET: -800 mL          Lab Data                        9.4    6.81  )-----------( 261      ( 11 Jun 2019 05:57 )             30.1     06-11    142  |  106  |  17  ----------------------------<  119<H>  4.1   |  31  |  1.50<H>    Ca    8.7      11 Jun 2019 05:57  Mg     2.0     06-10              Review of Systems	      Objective     Physical Examination      heart s1s2  lung dec BS    Pertinent Lab findings & Imaging      Ale:  NO   Adequate UO     I&O's Detail    10 Paul 2019 07:01  -  11 Jun 2019 07:00  --------------------------------------------------------  IN:    Oral Fluid: 200 mL  Total IN: 200 mL    OUT:    Voided: 1000 mL  Total OUT: 1000 mL    Total NET: -800 mL               Discussed with:     Cultures:	        Radiology

## 2019-06-11 NOTE — PROGRESS NOTE ADULT - SUBJECTIVE AND OBJECTIVE BOX
Patient is a 48y old  Male who presents with a chief complaint of PE (11 Jun 2019 08:21)      INTERVAL HPI/OVERNIGHT EVENTS:      T(C): 36.6 (06-11-19 @ 07:34), Max: 36.8 (06-10-19 @ 23:21)  HR: 56 (06-11-19 @ 07:34) (52 - 66)  BP: 134/79 (06-11-19 @ 07:34) (111/67 - 158/75)  RR: 17 (06-11-19 @ 07:34) (17 - 18)  SpO2: 93% (06-11-19 @ 07:34) (93% - 100%)  Wt(kg): --    I&O's Summary    10 Paul 2019 07:01  -  11 Jun 2019 07:00  --------------------------------------------------------  IN: 200 mL / OUT: 1000 mL / NET: -800 mL        LABS:                        9.4    6.81  )-----------( 261      ( 11 Jun 2019 05:57 )             30.1     06-11    142  |  106  |  17  ----------------------------<  119<H>  4.1   |  31  |  1.50<H>    Ca    8.7      11 Jun 2019 05:57  Mg     2.0     06-10      PT/INR - ( 10 Paul 2019 05:47 )   PT: 13.7 sec;   INR: 1.20 ratio         PTT - ( 11 Jun 2019 05:57 )  PTT:36.0 sec  CAPILLARY BLOOD GLUCOSE      POCT Blood Glucose.: 142 mg/dL (11 Jun 2019 08:06)  POCT Blood Glucose.: 137 mg/dL (10 Paul 2019 21:05)  POCT Blood Glucose.: 126 mg/dL (10 Paul 2019 17:26)  POCT Blood Glucose.: 123 mg/dL (10 Paul 2019 11:54)          Culture - Urine (collected 06 Jun 2019 20:37)  Source: .Urine Clean Catch (Midstream)  Final Report (07 Jun 2019 22:22):    >=3 organisms. Probable collection contamination.    Culture - Blood (collected 06 Jun 2019 09:59)  Source: .Blood Blood  Preliminary Report (07 Jun 2019 10:01):    No growth to date.    Culture - Blood (collected 06 Jun 2019 09:58)  Source: .Blood Blood  Preliminary Report (07 Jun 2019 10:01):    No growth to date.         MEDICATIONS  (STANDING):  amiodarone    Tablet 100 milliGRAM(s) Oral daily  apixaban 10 milliGRAM(s) Oral every 12 hours  aspirin enteric coated 81 milliGRAM(s) Oral daily  atorvastatin 80 milliGRAM(s) Oral at bedtime  dextrose 5%. 1000 milliLiter(s) (50 mL/Hr) IV Continuous <Continuous>  dextrose 50% Injectable 12.5 Gram(s) IV Push once  dextrose 50% Injectable 25 Gram(s) IV Push once  dextrose 50% Injectable 25 Gram(s) IV Push once  docusate sodium 100 milliGRAM(s) Oral three times a day  furosemide    Tablet 40 milliGRAM(s) Oral daily  insulin glargine Injectable (LANTUS) 10 Unit(s) SubCutaneous at bedtime  insulin lispro (HumaLOG) corrective regimen sliding scale   SubCutaneous three times a day before meals  insulin lispro (HumaLOG) corrective regimen sliding scale   SubCutaneous at bedtime  insulin lispro Injectable (HumaLOG) 3 Unit(s) SubCutaneous three times a day before meals  nicotine - 21 mG/24Hr(s) Patch 1 patch Transdermal daily  pantoprazole    Tablet 40 milliGRAM(s) Oral two times a day  polyethylene glycol 3350 17 Gram(s) Oral at bedtime  senna 2 Tablet(s) Oral at bedtime  sucralfate 1 Gram(s) Oral two times a day  tamsulosin 0.4 milliGRAM(s) Oral at bedtime    MEDICATIONS  (PRN):  ALBUTerol    90 MICROgram(s) HFA Inhaler 2 Puff(s) Inhalation every 6 hours PRN Shortness of Breath and/or Wheezing  dextrose 40% Gel 15 Gram(s) Oral once PRN Blood Glucose LESS THAN 70 milliGRAM(s)/deciliter  glucagon  Injectable 1 milliGRAM(s) IntraMuscular once PRN Glucose LESS THAN 70 milligrams/deciliter      REVIEW OF SYSTEMS:  CONSTITUTIONAL: No fever, weight loss, or fatigue  EYES: No eye pain, visual disturbances, or discharge  ENMT: No difficulty hearing, tinnitus, vertigo; No sinus or throat pain  NECK: No pain or stiffness  RESPIRATORY: No cough, wheezing, chills or hemoptysis; No shortness of breath  CARDIOVASCULAR: No chest pain, palpitations, dizziness, or leg swelling  GASTROINTESTINAL: No abdominal or epigastric pain. No nausea, vomiting, or hematemesis; No diarrhea or constipation; No melena or hematochezia  GENITOURINARY: No dysuria, frequency, hematuria, or incontinence  NEUROLOGICAL: No headaches, memory loss, loss of strength, numbness, or tremors  SKIN: No itching, burning, rashes, or lesions   LYMPH NODES: No enlarged glands  ENDOCRINE: No heat or cold intolerance; No hair loss  MUSCULOSKELETAL: No joint pain or swelling; No muscle, back, or extremity pain  PSYCHIATRIC: No depression, anxiety, mood swings, or difficulty sleeping  HEME/LYMPH: No easy bruising, or bleeding gums  ALLERGY AND IMMUNOLOGIC: No hives or eczema    RADIOLOGY & ADDITIONAL TESTS:    Imaging Personally Reviewed:  [x ] YES  [ ] NO    Consultant(s) Notes Reviewed:  [x ] YES  [ ] NO    PHYSICAL EXAM:  GENERAL: NAD, well-developed  HEAD: Atraumatic, Normocephalic  EYES: EOMI, PERRLA, conjunctiva and sclera clear  ENMT: No tonsillar erythema, exudates, or enlargement; Moist mucous membranes  NECK: Supple, No JVD  NERVOUS SYSTEM: Alert & Oriented X3, Good concentration; Motor strength 5/5 B/L upper and lower extremities  CHEST/LUNG: Clear to auscultation bilaterally, No rales, rhonchi, wheezing, or rubs.  Chest pain reproduced upon palpation of the upper right area of the chest.  HEART: Regular rate and rhythm; No murmurs, rubs, or gallops  ABDOMEN: Soft, Nontender, Nondistended; Bowel sounds present  EXTREMITIES: 2+ Peripheral Pulses, 1+ pitting edema of ankles b/l, bandaged left foot with amputated left hallux with stitches clean, dry and intact    Care Discussed with Consultants/Other Providers [x ] YES  [ ] NO

## 2019-06-11 NOTE — PROGRESS NOTE ADULT - SUBJECTIVE AND OBJECTIVE BOX
CAPILLARY BLOOD GLUCOSE      POCT Blood Glucose.: 137 mg/dL (10 Paul 2019 21:05)  POCT Blood Glucose.: 126 mg/dL (10 Paul 2019 17:26)  POCT Blood Glucose.: 123 mg/dL (10 Paul 2019 11:54)  POCT Blood Glucose.: 152 mg/dL (10 Paul 2019 08:18)      Vital Signs Last 24 Hrs  T(C): 36.3 (11 Jun 2019 04:59), Max: 36.8 (10 Palu 2019 23:21)  T(F): 97.3 (11 Jun 2019 04:59), Max: 98.2 (10 Paul 2019 23:21)  HR: 52 (11 Jun 2019 04:59) (52 - 66)  BP: 111/67 (11 Jun 2019 04:59) (101/67 - 158/75)  BP(mean): --  RR: 17 (11 Jun 2019 04:59) (17 - 18)  SpO2: 94% (11 Jun 2019 04:59) (94% - 100%)    General: WN/WD NAD  Respiratory: CTA B/L  CV: RRR, S1S2, no murmurs, rubs or gallops  Abdominal: Soft, NT, ND +BS, Last BM  Extremities: b/l le foot dsg intact     06-11    142  |  106  |  17  ----------------------------<  119<H>  4.1   |  31  |  1.50<H>    Ca    8.7      11 Jun 2019 05:57  Mg     2.0     06-10        atorvastatin 80 milliGRAM(s) Oral at bedtime  dextrose 40% Gel 15 Gram(s) Oral once PRN  dextrose 50% Injectable 12.5 Gram(s) IV Push once  dextrose 50% Injectable 25 Gram(s) IV Push once  dextrose 50% Injectable 25 Gram(s) IV Push once  glucagon  Injectable 1 milliGRAM(s) IntraMuscular once PRN  insulin glargine Injectable (LANTUS) 10 Unit(s) SubCutaneous at bedtime  insulin lispro (HumaLOG) corrective regimen sliding scale   SubCutaneous three times a day before meals  insulin lispro (HumaLOG) corrective regimen sliding scale   SubCutaneous at bedtime  insulin lispro Injectable (HumaLOG) 3 Unit(s) SubCutaneous three times a day before meals

## 2019-06-11 NOTE — PROGRESS NOTE ADULT - ASSESSMENT
49yo male who is post-op status post left hallux amputation and partial 1st metatarsal resection from 5/28/19.

## 2019-06-11 NOTE — PROGRESS NOTE ADULT - PROBLEM SELECTOR PLAN 1
on eliquis now -   cardio follow up noted  on room air  increase activity - check sat on room air on exertion -    PE, PAF, GI bleed  Anemia  GI eval noted  out of bed as tolerated  increase activity  serial Hgb  cvs regimen and AF - rate control and BP control  tolerating room air  smoking cess ed - counseling - Bronchodilators for emphysema  will need PFT as outpatient - and CT chest in 6 months for pulm nodules follow up.

## 2019-06-11 NOTE — PROGRESS NOTE ADULT - ASSESSMENT
48 year old male with PMH of afib, NSTEMI, osteomyelitis,  HTN and DM2, who presented to ED with chest pressure, admitted for management of PE. Patient was recently was admitted to [place] for perforated Antral Gastric Ulcer, s/p Emergent Exp-lap Omentopexy and plication 5/11. Found to have new onset afib with elevated troponin post-operatively indicative of NSTEMI, started on Amiodarone and converted to sinus rhythm, left foot osteo s/p debridement OR 5/28    Cardio consulted for chest pain, found to have PE     PE (RUL subsegmental)  - Was on Heparin drip.  Discussed with Pulm and GI.  GI confirmed okay to start NOAC.  Now on Eliquis per Primary.  Monitor for s/s bleeding  - Venous USD negative.  - Monitor for s/s bleeding  - Continue to monitor on tele    Afib  - He went into Afib post op last admission requiring Amio load  - It was short-lived that it was decided he did not need long term AC, with plans to do an outpatient event monitor  - He remained in NSR throughout his stay prior to discharge  - Now, maintaining SB to 40s-100's on monitor, though, asymptomatic and hemodynamically stable.  Continue lower dose of Amiodarone 100 mg daily   - Had NSVT but no further episodes of NSVT x 2 days  - Monitor electrolytes, replete to keep K>4 and Mag>2     NSTEMI vs demand ischemia  - He had elevated troponins post op from his recent admission, though all his CPK's were negative.  Plan was outpatient ischemic eval  - TTE then showed EF 65% with no segmental wall motion abnormality  - EKG unchanged from admission, no acute ST changes.  His cardiac enzymes this admission are all negative  - Continue 81 mg daily  - Continue statin  - Hold ACEI  - Unable to start BB secondary to persistent bradycardia    Edema  - b/l pitting LE edema L>R may be chronic.  He has no evidence of volume overload apart from his edema.  Pro-BNP = 614  - s/p Lasix 40 mg IV x 1.  Continue Lasix 40 mg PO.  Monitor strict I & O's  - Daily weights    OBI  - Creatinine bumped to 1.5 <--1.3 <--1.2.  If continues to worsen, will discontinue Lasix    HTN  - BP well controlled, not on any BP medications  - monitor vitals closely, can continue amiodarone, atorvastatin    DM2  - Per Primary    - Other cardiovascular workup will depend on clinical course. All other workup per primary team.  - Will follow.    Henrietta Kim [facility]  Cardiology 48 year old male with PMH of afib, NSTEMI, osteomyelitis,  HTN and DM2, who presented to ED with chest pressure, admitted for management of PE. Patient was recently was admitted to [place] for perforated Antral Gastric Ulcer, s/p Emergent Exp-lap Omentopexy and plication 5/11. Found to have new onset afib with elevated troponin post-operatively indicative of NSTEMI, started on Amiodarone and converted to sinus rhythm, left foot osteo s/p debridement OR 5/28    Cardio consulted for chest pain, found to have PE     PE (RUL subsegmental)  - Was on Heparin drip.  Discussed with Pulm and GI. GI confirmed okay to start NOAC.  Now on Eliquis per Primary.  Monitor for s/s bleeding  - Venous USD negative.  - Monitor for s/s bleeding  - Continue to monitor on tele    Afib  - He went into Afib post op last admission requiring Amio load  - It was short-lived that it was decided he did not need long term AC, with plans to do an outpatient event monitor  - He remained in NSR throughout his stay prior to discharge  - Now, maintaining SB to 40s-100's on monitor, though, asymptomatic and hemodynamically stable.  Continue lower dose of Amiodarone 100 mg daily   - Had NSVT but no further episodes of NSVT x 2 days  - Monitor electrolytes, replete to keep K>4 and Mag>2     NSTEMI vs demand ischemia  - He had elevated troponins post op from his recent admission, though all his CPK's were negative.  Plan was outpatient ischemic eval  - TTE then showed EF 65% with no segmental wall motion abnormality  - EKG unchanged from admission, no acute ST changes.  His cardiac enzymes this admission are all negative  - Continue 81 mg daily  - Continue statin  - Hold ACEI  - Unable to start BB secondary to persistent bradycardia    Edema  - b/l pitting LE edema L>R may be chronic.  He has no evidence of volume overload apart from his edema.  Pro-BNP = 614  - s/p Lasix 40 mg IV x 1.  Continue Lasix 40 mg PO.  Monitor strict I & O's  - Daily weights    OBI  - Creatinine bumped to 1.5 <--1.3 <--1.2.  If continues to worsen, will discontinue Lasix    HTN  - BP well controlled, not on any BP medications  - monitor vitals closely, can continue amiodarone, atorvastatin    DM2  - Per Primary    - Other cardiovascular workup will depend on clinical course. All other workup per primary team.  - Will follow.    Henrietta Kim [facility]  Cardiology

## 2019-06-12 LAB
ANION GAP SERPL CALC-SCNC: 6 MMOL/L — SIGNIFICANT CHANGE UP (ref 5–17)
BUN SERPL-MCNC: 19 MG/DL — SIGNIFICANT CHANGE UP (ref 7–23)
CALCIUM SERPL-MCNC: 8.7 MG/DL — SIGNIFICANT CHANGE UP (ref 8.5–10.1)
CHLORIDE SERPL-SCNC: 103 MMOL/L — SIGNIFICANT CHANGE UP (ref 96–108)
CO2 SERPL-SCNC: 31 MMOL/L — SIGNIFICANT CHANGE UP (ref 22–31)
CREAT SERPL-MCNC: 1.5 MG/DL — HIGH (ref 0.5–1.3)
GLUCOSE SERPL-MCNC: 126 MG/DL — HIGH (ref 70–99)
HCT VFR BLD CALC: 31.9 % — LOW (ref 39–50)
HGB BLD-MCNC: 10.1 G/DL — LOW (ref 13–17)
MCHC RBC-ENTMCNC: 30.8 PG — SIGNIFICANT CHANGE UP (ref 27–34)
MCHC RBC-ENTMCNC: 31.7 GM/DL — LOW (ref 32–36)
MCV RBC AUTO: 97.3 FL — SIGNIFICANT CHANGE UP (ref 80–100)
NRBC # BLD: 0 /100 WBCS — SIGNIFICANT CHANGE UP (ref 0–0)
PLATELET # BLD AUTO: 250 K/UL — SIGNIFICANT CHANGE UP (ref 150–400)
POTASSIUM SERPL-MCNC: 3.9 MMOL/L — SIGNIFICANT CHANGE UP (ref 3.5–5.3)
POTASSIUM SERPL-SCNC: 3.9 MMOL/L — SIGNIFICANT CHANGE UP (ref 3.5–5.3)
RBC # BLD: 3.28 M/UL — LOW (ref 4.2–5.8)
RBC # FLD: 14.2 % — SIGNIFICANT CHANGE UP (ref 10.3–14.5)
SODIUM SERPL-SCNC: 140 MMOL/L — SIGNIFICANT CHANGE UP (ref 135–145)
WBC # BLD: 6.64 K/UL — SIGNIFICANT CHANGE UP (ref 3.8–10.5)
WBC # FLD AUTO: 6.64 K/UL — SIGNIFICANT CHANGE UP (ref 3.8–10.5)

## 2019-06-12 PROCEDURE — 99232 SBSQ HOSP IP/OBS MODERATE 35: CPT

## 2019-06-12 RX ORDER — OXYCODONE AND ACETAMINOPHEN 5; 325 MG/1; MG/1
1 TABLET ORAL EVERY 6 HOURS
Refills: 0 | Status: DISCONTINUED | OUTPATIENT
Start: 2019-06-12 | End: 2019-06-13

## 2019-06-12 RX ORDER — ONDANSETRON 8 MG/1
4 TABLET, FILM COATED ORAL ONCE
Refills: 0 | Status: COMPLETED | OUTPATIENT
Start: 2019-06-12 | End: 2019-06-12

## 2019-06-12 RX ADMIN — Medication 1 GRAM(S): at 05:52

## 2019-06-12 RX ADMIN — Medication 1 PATCH: at 19:40

## 2019-06-12 RX ADMIN — POLYETHYLENE GLYCOL 3350 17 GRAM(S): 17 POWDER, FOR SOLUTION ORAL at 21:11

## 2019-06-12 RX ADMIN — Medication 100 MILLIGRAM(S): at 13:51

## 2019-06-12 RX ADMIN — Medication 3 UNIT(S): at 17:31

## 2019-06-12 RX ADMIN — ATORVASTATIN CALCIUM 80 MILLIGRAM(S): 80 TABLET, FILM COATED ORAL at 21:12

## 2019-06-12 RX ADMIN — Medication 100 MILLIGRAM(S): at 05:52

## 2019-06-12 RX ADMIN — Medication 3 UNIT(S): at 12:25

## 2019-06-12 RX ADMIN — Medication 100 MILLIGRAM(S): at 21:12

## 2019-06-12 RX ADMIN — OXYCODONE AND ACETAMINOPHEN 1 TABLET(S): 5; 325 TABLET ORAL at 21:53

## 2019-06-12 RX ADMIN — Medication 1 PATCH: at 17:34

## 2019-06-12 RX ADMIN — Medication 1: at 12:25

## 2019-06-12 RX ADMIN — SENNA PLUS 2 TABLET(S): 8.6 TABLET ORAL at 21:12

## 2019-06-12 RX ADMIN — Medication 3 UNIT(S): at 08:06

## 2019-06-12 RX ADMIN — INSULIN GLARGINE 10 UNIT(S): 100 INJECTION, SOLUTION SUBCUTANEOUS at 22:35

## 2019-06-12 RX ADMIN — OXYCODONE AND ACETAMINOPHEN 1 TABLET(S): 5; 325 TABLET ORAL at 22:45

## 2019-06-12 RX ADMIN — Medication 1 GRAM(S): at 17:31

## 2019-06-12 RX ADMIN — ONDANSETRON 4 MILLIGRAM(S): 8 TABLET, FILM COATED ORAL at 21:53

## 2019-06-12 RX ADMIN — APIXABAN 10 MILLIGRAM(S): 2.5 TABLET, FILM COATED ORAL at 05:52

## 2019-06-12 RX ADMIN — Medication 1 PATCH: at 12:25

## 2019-06-12 RX ADMIN — TAMSULOSIN HYDROCHLORIDE 0.4 MILLIGRAM(S): 0.4 CAPSULE ORAL at 21:12

## 2019-06-12 RX ADMIN — APIXABAN 10 MILLIGRAM(S): 2.5 TABLET, FILM COATED ORAL at 17:32

## 2019-06-12 RX ADMIN — PANTOPRAZOLE SODIUM 40 MILLIGRAM(S): 20 TABLET, DELAYED RELEASE ORAL at 05:52

## 2019-06-12 RX ADMIN — PANTOPRAZOLE SODIUM 40 MILLIGRAM(S): 20 TABLET, DELAYED RELEASE ORAL at 17:31

## 2019-06-12 RX ADMIN — Medication 81 MILLIGRAM(S): at 12:28

## 2019-06-12 NOTE — PROGRESS NOTE ADULT - SUBJECTIVE AND OBJECTIVE BOX
Patient is a 48y old  Male who presents with a chief complaint of PE (12 Jun 2019 07:41)      INTERVAL HPI/OVERNIGHT EVENTS:  Podiatry removed stitches yesterday evening. Patient had foot pain overnight that was relieved with percocet. No complaints.     T(C): 36.6 (06-12-19 @ 07:19), Max: 36.8 (06-11-19 @ 15:28)  HR: 56 (06-12-19 @ 07:19) (50 - 56)  BP: 113/70 (06-12-19 @ 07:19) (112/71 - 144/76)  RR: 18 (06-12-19 @ 07:19) (17 - 18)  SpO2: 93% (06-12-19 @ 07:19) (93% - 100%)  Wt(kg): --    I&O's Summary    11 Jun 2019 07:01  -  12 Jun 2019 07:00  --------------------------------------------------------  IN: 100 mL / OUT: 0 mL / NET: 100 mL        LABS:                        10.1   6.64  )-----------( 250      ( 12 Jun 2019 06:47 )             31.9     06-12    140  |  103  |  19  ----------------------------<  126<H>  3.9   |  31  |  1.50<H>    Ca    8.7      12 Jun 2019 06:47      PTT - ( 11 Jun 2019 05:57 )  PTT:36.0 sec  CAPILLARY BLOOD GLUCOSE      POCT Blood Glucose.: 132 mg/dL (12 Jun 2019 08:00)  POCT Blood Glucose.: 315 mg/dL (11 Jun 2019 21:46)  POCT Blood Glucose.: 177 mg/dL (11 Jun 2019 16:52)  POCT Blood Glucose.: 137 mg/dL (11 Jun 2019 11:46)          Culture - Urine (collected 06 Jun 2019 20:37)  Source: .Urine Clean Catch (Midstream)  Final Report (07 Jun 2019 22:22):    >=3 organisms. Probable collection contamination.    Culture - Blood (collected 06 Jun 2019 09:59)  Source: .Blood Blood  Final Report (11 Jun 2019 10:00):    No growth at 5 days.    Culture - Blood (collected 06 Jun 2019 09:58)  Source: .Blood Blood  Final Report (11 Jun 2019 10:00):    No growth at 5 days.         MEDICATIONS  (STANDING):  amiodarone    Tablet 100 milliGRAM(s) Oral daily  apixaban 10 milliGRAM(s) Oral every 12 hours  aspirin enteric coated 81 milliGRAM(s) Oral daily  atorvastatin 80 milliGRAM(s) Oral at bedtime  dextrose 5%. 1000 milliLiter(s) (50 mL/Hr) IV Continuous <Continuous>  dextrose 50% Injectable 12.5 Gram(s) IV Push once  dextrose 50% Injectable 25 Gram(s) IV Push once  dextrose 50% Injectable 25 Gram(s) IV Push once  docusate sodium 100 milliGRAM(s) Oral three times a day  insulin glargine Injectable (LANTUS) 10 Unit(s) SubCutaneous at bedtime  insulin lispro (HumaLOG) corrective regimen sliding scale   SubCutaneous three times a day before meals  insulin lispro (HumaLOG) corrective regimen sliding scale   SubCutaneous at bedtime  insulin lispro Injectable (HumaLOG) 3 Unit(s) SubCutaneous three times a day before meals  nicotine - 21 mG/24Hr(s) Patch 1 patch Transdermal daily  pantoprazole    Tablet 40 milliGRAM(s) Oral two times a day  polyethylene glycol 3350 17 Gram(s) Oral at bedtime  senna 2 Tablet(s) Oral at bedtime  sucralfate 1 Gram(s) Oral two times a day  tamsulosin 0.4 milliGRAM(s) Oral at bedtime    MEDICATIONS  (PRN):  ALBUTerol    90 MICROgram(s) HFA Inhaler 2 Puff(s) Inhalation every 6 hours PRN Shortness of Breath and/or Wheezing  dextrose 40% Gel 15 Gram(s) Oral once PRN Blood Glucose LESS THAN 70 milliGRAM(s)/deciliter  glucagon  Injectable 1 milliGRAM(s) IntraMuscular once PRN Glucose LESS THAN 70 milligrams/deciliter      REVIEW OF SYSTEMS:  CONSTITUTIONAL: No fever, weight loss, or fatigue  EYES: No eye pain, visual disturbances, or discharge  ENMT: No difficulty hearing, tinnitus, vertigo; No sinus or throat pain  NECK: No pain or stiffness  RESPIRATORY: No cough, wheezing, chills or hemoptysis; No shortness of breath  CARDIOVASCULAR: No chest pain, palpitations, dizziness, or leg swelling  GASTROINTESTINAL: No abdominal or epigastric pain. No nausea, vomiting, or hematemesis; No diarrhea or constipation; No melena or hematochezia  GENITOURINARY: No dysuria, frequency, hematuria, or incontinence  NEUROLOGICAL: No headaches, memory loss, loss of strength, numbness, or tremors  SKIN: No itching, burning, rashes, or lesions   LYMPH NODES: No enlarged glands  ENDOCRINE: No heat or cold intolerance; No hair loss  MUSCULOSKELETAL: No joint pain or swelling; No muscle, back, or extremity pain  PSYCHIATRIC: No depression, anxiety, mood swings, or difficulty sleeping  HEME/LYMPH: No easy bruising, or bleeding gums  ALLERGY AND IMMUNOLOGIC: No hives or eczema    RADIOLOGY & ADDITIONAL TESTS:    Imaging Personally Reviewed:  [x ] YES  [ ] NO    Consultant(s) Notes Reviewed:  [x ] YES  [ ] NO    PHYSICAL EXAM:  GENERAL: NAD, well-developed  HEAD: Atraumatic, Normocephalic  EYES: EOMI, PERRLA, conjunctiva and sclera clear  ENMT: No tonsillar erythema, exudates, or enlargement; Moist mucous membranes  NECK: Supple, No JVD  NERVOUS SYSTEM: Alert & Oriented X3, Good concentration; Motor strength 5/5 B/L upper and lower extremities  CHEST/LUNG: Clear to auscultation bilaterally, No rales, rhonchi, wheezing, or rubs.  Chest pain reproduced upon palpation of the upper right area of the chest.  HEART: Regular rate and rhythm; No murmurs, rubs, or gallops  ABDOMEN: Soft, Nontender, Nondistended; Bowel sounds present  EXTREMITIES: 2+ Peripheral Pulses, 1+ pitting edema of ankles b/l, bandaged left foot with amputated left hallux , stitches removed serosanguinous dried on bandage,    Care Discussed with Consultants/Other Providers [ x] YES  [ ] NO

## 2019-06-12 NOTE — PROGRESS NOTE ADULT - PROBLEM SELECTOR PLAN 1
no overt gi bleed, hgb stable, reports passing brown stool  cont ppi/carafate  monitor cbc, transfuse prn  no gi objection to ac  will follow

## 2019-06-12 NOTE — PROGRESS NOTE ADULT - SUBJECTIVE AND OBJECTIVE BOX
49yo male seen bedside during podiatry rounds, s/p left hallux and partial 1st metatarsal amputation.    Vital Signs Last 24 Hrs  T(C): 36.6 (12 Jun 2019 11:46), Max: 36.8 (11 Jun 2019 15:28)  T(F): 97.8 (12 Jun 2019 11:46), Max: 98.2 (11 Jun 2019 15:28)  HR: 59 (12 Jun 2019 11:46) (50 - 59)  BP: 116/78 (12 Jun 2019 11:46) (113/70 - 144/76)  BP(mean): --  RR: 18 (12 Jun 2019 11:46) (17 - 18)  SpO2: 98% (12 Jun 2019 11:46) (93% - 100%)    06-12    140  |  103  |  19  ----------------------------<  126<H>  3.9   |  31  |  1.50<H>    Ca    8.7      12 Jun 2019 06:47    CBC Full  -  ( 12 Jun 2019 06:47 )  WBC Count : 6.64 K/uL  RBC Count : 3.28 M/uL  Hemoglobin : 10.1 g/dL  Hematocrit : 31.9 %  Platelet Count - Automated : 250 K/uL  Mean Cell Volume : 97.3 fl  Mean Cell Hemoglobin : 30.8 pg  Mean Cell Hemoglobin Concentration : 31.7 gm/dL  Auto Neutrophil # : x  Auto Lymphocyte # : x  Auto Monocyte # : x  Auto Eosinophil # : x  Auto Basophil # : x  Auto Neutrophil % : x  Auto Lymphocyte % : x  Auto Monocyte % : x  Auto Eosinophil % : x  Auto Basophil % : x      Objective: left foot focused  Vasc: DP and PT palpable; cap refill time < 3 seconds to all remaining digits; temp gradient within normal limits; no edema  Derm:  -surgical wound to hallux amputation site, no sutures, dehiscence noted to the middle and distal aspects; mild serous drainage noted; no malodor; to subcutaneous tissue, no bone exposed  Neuro: epicritic sensation diminished to all digits  Ortho: s/p hallux and partial 1st metatarsal amputation, primarily closed

## 2019-06-12 NOTE — PROGRESS NOTE ADULT - PROBLEM SELECTOR PLAN 1
cont lantus 10 units qhs  cont humalog 3 units 3x/day before meals  cont mod dose humalog scale coverage  cont cons cho diet  goal bg 100-180 in hosp setting

## 2019-06-12 NOTE — PROGRESS NOTE ADULT - PROBLEM SELECTOR PLAN 1
seen and examined, on Eliquis, am labs pending, on room air  -   cardio follow up noted  on room air  increase activity - check sat on room air on exertion -    PE, PAF, GI bleed  Anemia  GI eval noted  out of bed as tolerated  increase activity  serial Hgb  cvs regimen and AF - rate control and BP control  tolerating room air  smoking cess ed - counseling - Bronchodilators for emphysema  will need PFT as outpatient - and CT chest in 6 months for pulm nodules follow up.

## 2019-06-12 NOTE — PROGRESS NOTE ADULT - SUBJECTIVE AND OBJECTIVE BOX
CAPILLARY BLOOD GLUCOSE      POCT Blood Glucose.: 178 mg/dL (12 Jun 2019 11:31)  POCT Blood Glucose.: 132 mg/dL (12 Jun 2019 08:00)  POCT Blood Glucose.: 315 mg/dL (11 Jun 2019 21:46)  POCT Blood Glucose.: 177 mg/dL (11 Jun 2019 16:52)  POCT Blood Glucose.: 137 mg/dL (11 Jun 2019 11:46)      Vital Signs Last 24 Hrs  T(C): 36.6 (12 Jun 2019 07:19), Max: 36.8 (11 Jun 2019 15:28)  T(F): 97.9 (12 Jun 2019 07:19), Max: 98.2 (11 Jun 2019 15:28)  HR: 56 (12 Jun 2019 07:19) (50 - 56)  BP: 113/70 (12 Jun 2019 07:19) (112/71 - 144/76)  BP(mean): --  RR: 18 (12 Jun 2019 07:19) (17 - 18)  SpO2: 93% (12 Jun 2019 07:19) (93% - 100%)    General: WN/WD NAD  Respiratory: CTA B/L  CV: RRR, S1S2, no murmurs, rubs or gallops  Abdominal: Soft, NT, ND +BS, Last BM  Extremities: b/l le foot dsg intact     06-12    140  |  103  |  19  ----------------------------<  126<H>  3.9   |  31  |  1.50<H>    Ca    8.7      12 Jun 2019 06:47        atorvastatin 80 milliGRAM(s) Oral at bedtime  dextrose 40% Gel 15 Gram(s) Oral once PRN  dextrose 50% Injectable 12.5 Gram(s) IV Push once  dextrose 50% Injectable 25 Gram(s) IV Push once  dextrose 50% Injectable 25 Gram(s) IV Push once  glucagon  Injectable 1 milliGRAM(s) IntraMuscular once PRN  insulin glargine Injectable (LANTUS) 10 Unit(s) SubCutaneous at bedtime  insulin lispro (HumaLOG) corrective regimen sliding scale   SubCutaneous three times a day before meals  insulin lispro (HumaLOG) corrective regimen sliding scale   SubCutaneous at bedtime  insulin lispro Injectable (HumaLOG) 3 Unit(s) SubCutaneous three times a day before meals

## 2019-06-12 NOTE — PROGRESS NOTE ADULT - SUBJECTIVE AND OBJECTIVE BOX
INTERVAL HPI/OVERNIGHT EVENTS:  pt seen and examined  denies n/v/abd pain  denies melena/brbrpr  states passing brown stool  eating    MEDICATIONS  (STANDING):  amiodarone    Tablet 100 milliGRAM(s) Oral daily  apixaban 10 milliGRAM(s) Oral every 12 hours  aspirin enteric coated 81 milliGRAM(s) Oral daily  atorvastatin 80 milliGRAM(s) Oral at bedtime  dextrose 5%. 1000 milliLiter(s) (50 mL/Hr) IV Continuous <Continuous>  dextrose 50% Injectable 12.5 Gram(s) IV Push once  dextrose 50% Injectable 25 Gram(s) IV Push once  dextrose 50% Injectable 25 Gram(s) IV Push once  docusate sodium 100 milliGRAM(s) Oral three times a day  insulin glargine Injectable (LANTUS) 10 Unit(s) SubCutaneous at bedtime  insulin lispro (HumaLOG) corrective regimen sliding scale   SubCutaneous three times a day before meals  insulin lispro (HumaLOG) corrective regimen sliding scale   SubCutaneous at bedtime  insulin lispro Injectable (HumaLOG) 3 Unit(s) SubCutaneous three times a day before meals  nicotine - 21 mG/24Hr(s) Patch 1 patch Transdermal daily  pantoprazole    Tablet 40 milliGRAM(s) Oral two times a day  polyethylene glycol 3350 17 Gram(s) Oral at bedtime  senna 2 Tablet(s) Oral at bedtime  sucralfate 1 Gram(s) Oral two times a day  tamsulosin 0.4 milliGRAM(s) Oral at bedtime    MEDICATIONS  (PRN):  ALBUTerol    90 MICROgram(s) HFA Inhaler 2 Puff(s) Inhalation every 6 hours PRN Shortness of Breath and/or Wheezing  dextrose 40% Gel 15 Gram(s) Oral once PRN Blood Glucose LESS THAN 70 milliGRAM(s)/deciliter  glucagon  Injectable 1 milliGRAM(s) IntraMuscular once PRN Glucose LESS THAN 70 milligrams/deciliter  oxyCODONE    5 mG/acetaminophen 325 mG 1 Tablet(s) Oral every 6 hours PRN Severe Pain (7 - 10)      Allergies    fish (Hives)  No Known Drug Allergies    Intolerances        Review of Systems:    General:  No wt loss, fevers, chills, night sweats, fatigue   Eyes:  Good vision, no reported pain  ENT:  No sore throat, pain, runny nose, dysphagia  CV:  No pain, palpitations, hypo/hypertension  Resp:  No dyspnea, cough, tachypnea, wheezing  GI:  No pain, No nausea, No vomiting, No diarrhea, No constipation, No weight loss, No fever, No pruritis, No rectal bleeding, No melena, No dysphagia  :  No pain, bleeding, incontinence, nocturia  Muscle:  No pain, weakness  Neuro:  No weakness, tingling, memory problems  Psych:  No fatigue, insomnia, mood problems, depression  Endocrine:  No polyuria, polydypsia, cold/heat intolerance  Heme:  No petechiae, ecchymosis, easy bruisability  Skin:  No rash, tattoos, scars, edema      Vital Signs Last 24 Hrs  T(C): 36.6 (12 Jun 2019 07:19), Max: 36.8 (11 Jun 2019 15:28)  T(F): 97.9 (12 Jun 2019 07:19), Max: 98.2 (11 Jun 2019 15:28)  HR: 56 (12 Jun 2019 07:19) (50 - 56)  BP: 113/70 (12 Jun 2019 07:19) (112/71 - 144/76)  BP(mean): --  RR: 18 (12 Jun 2019 07:19) (17 - 18)  SpO2: 93% (12 Jun 2019 07:19) (93% - 100%)    PHYSICAL EXAM:  Constitutional: NAD  HEENT: ncat  Neck: No LAD  Respiratory: dec bs  Cardiovascular: S1 and S2, RRR  Gastrointestinal: soft nt mild dt  Extremities: edema  Vascular: 2+ peripheral pulses  Neurological: A/O x 3  Skin: No rashes      LABS:                        10.1   6.64  )-----------( 250      ( 12 Jun 2019 06:47 )             31.9     06-12    140  |  103  |  19  ----------------------------<  126<H>  3.9   |  31  |  1.50<H>    Ca    8.7      12 Jun 2019 06:47      PTT - ( 11 Jun 2019 05:57 )  PTT:36.0 sec      RADIOLOGY & ADDITIONAL TESTS:

## 2019-06-12 NOTE — PROGRESS NOTE ADULT - ASSESSMENT
48 year old male with PMH of afib, NSTEMI, osteomyelitis,  HTN and DM2, who presented to ED with chest pressure, admitted for management of PE. Patient was recently was admitted to [place] for perforated Antral Gastric Ulcer, s/p Emergent Exp-lap Omentopexy and plication 5/11. Found to have new onset afib with elevated troponin post-operatively indicative of NSTEMI, started on Amiodarone and converted to sinus rhythm, left foot osteo s/p debridement OR 5/28    Cardio consulted for chest pain, found to have PE     PE (RUL subsegmental)  - Was on Heparin drip.  Discussed with Pulm and GI. GI confirmed okay to start NOAC.  Now on Eliquis per Primary.  Monitor for s/s bleeding  - Venous USD negative.  - Monitor for s/s bleeding    Afib  - He went into Afib post op last admission requiring Amio load  - It was short-lived that it was decided he did not need long term AC, with plans to do an outpatient event monitor  - He remained in NSR throughout his stay prior to discharge  - Now, maintaining SB at 50's on monitor but remains asymptomatic and hemodynamically stable.  Continue lower dose of Amiodarone 100 mg daily   - Had NSVT but no further episodes of NSVT x 3 days  - Monitor electrolytes, replete to keep K>4 and Mag>2  - Continue to monitor on tele.  If HR remains stable, may discontinue telemetry in am     NSTEMI vs demand ischemia  - He had elevated troponins post op from his recent admission, though all his CPK's were negative.  Plan was outpatient ischemic eval  - TTE then showed EF 65% with no segmental wall motion abnormality  - EKG unchanged from admission, no acute ST changes.  His cardiac enzymes this admission are all negative  - Continue 81 mg daily  - Continue statin  - Hold ACEI  - Unable to start BB secondary to persistent bradycardia    Edema  - b/l pitting LE edema L>R may be chronic.  He has no evidence of volume overload apart from his edema.  Pro-BNP = 614  - s/p Lasix 40 mg IV x 1.  Continue Lasix 40 mg PO.  Monitor strict I & O's  - Daily weights    OBI  - Creatinine bumped but stable at 1.5<-- 1.5 <--1.3 <--1.2.  Now off Lasix per Primary    HTN  - BP well controlled, not on any BP medications  - monitor vitals closely, can continue amiodarone, atorvastatin    DM2  - Per Primary    - Other cardiovascular workup will depend on clinical course. All other workup per primary team.  - Will follow.    Henrietta Kim [facility]  Cardiology

## 2019-06-12 NOTE — PROGRESS NOTE ADULT - PROBLEM SELECTOR PLAN 10
DVt proph- heparin drip for current PE

## 2019-06-12 NOTE — PROGRESS NOTE ADULT - PROBLEM SELECTOR PLAN 1
CTA- subsegmental right upper lobe PE, questionable right middle lobe subsegmental PE.  - Mild to moderate Right sided CP reproduced upon palpation of the upper right area of the chest. Possibly musculoskeletal/costochondritis vs pleuritic pain from PE  - NSAIDs contraindicated in the setting of recent perforated gastric ulcer   - Cardio consulted  - Serial CE and EKGs negative   - s/p Heparin 6500units IV push x1 in the ED  - continue 25,000units heparin infusion at 15cc/hr  - LE dopplers negative CTA- subsegmental right upper lobe PE, questionable right middle lobe subsegmental PE.  - Mild to moderate Right sided CP reproduced upon palpation of the upper right area of the chest. Possibly musculoskeletal/costochondritis vs pleuritic pain from PE  - NSAIDs contraindicated in the setting of recent perforated gastric ulcer   - Cardio consulted  - Serial CE and EKGs negative   - s/p Heparin 6500units IV push  and  25,000units heparin infusion at 15cc/hr  -  d/c;d Heparin   - Started eliquis 10 mg BID   - LE dopplers negative

## 2019-06-12 NOTE — PROGRESS NOTE ADULT - ASSESSMENT
47yo male who is post-op status post left hallux amputation and partial 1st metatarsal resection from 5/28/19.

## 2019-06-12 NOTE — PROGRESS NOTE ADULT - SUBJECTIVE AND OBJECTIVE BOX
[facility] -- Bolivar Carbajal, Brittany Gallegos Pannella, Patel, Savella  Office # [phone]    Follow Up:  PE, Recent NSTEMI     Subjective/Observations: Sitting on the chair, comfortable on RA, watching TV.  Admits to have ambulated in the hallway with no DUBOIS.  Denies orthopnea.  Denies dizziness or lightheadedness.  Denies CP or palpitations    REVIEW OF SYSTEMS: All other review of systems is negative unless indicated above    PAST MEDICAL & SURGICAL HISTORY:  BPH (benign prostatic hyperplasia)  Hypertension  Afib  Diabetes mellitus with no complication  H/O abdominal surgery    MEDICATIONS  (STANDING):  amiodarone    Tablet 100 milliGRAM(s) Oral daily  apixaban 10 milliGRAM(s) Oral every 12 hours  aspirin enteric coated 81 milliGRAM(s) Oral daily  atorvastatin 80 milliGRAM(s) Oral at bedtime  dextrose 5%. 1000 milliLiter(s) (50 mL/Hr) IV Continuous <Continuous>  dextrose 50% Injectable 12.5 Gram(s) IV Push once  dextrose 50% Injectable 25 Gram(s) IV Push once  dextrose 50% Injectable 25 Gram(s) IV Push once  docusate sodium 100 milliGRAM(s) Oral three times a day  insulin glargine Injectable (LANTUS) 10 Unit(s) SubCutaneous at bedtime  insulin lispro (HumaLOG) corrective regimen sliding scale   SubCutaneous three times a day before meals  insulin lispro (HumaLOG) corrective regimen sliding scale   SubCutaneous at bedtime  insulin lispro Injectable (HumaLOG) 3 Unit(s) SubCutaneous three times a day before meals  nicotine - 21 mG/24Hr(s) Patch 1 patch Transdermal daily  pantoprazole    Tablet 40 milliGRAM(s) Oral two times a day  polyethylene glycol 3350 17 Gram(s) Oral at bedtime  senna 2 Tablet(s) Oral at bedtime  sucralfate 1 Gram(s) Oral two times a day  tamsulosin 0.4 milliGRAM(s) Oral at bedtime    MEDICATIONS  (PRN):  ALBUTerol    90 MICROgram(s) HFA Inhaler 2 Puff(s) Inhalation every 6 hours PRN Shortness of Breath and/or Wheezing  dextrose 40% Gel 15 Gram(s) Oral once PRN Blood Glucose LESS THAN 70 milliGRAM(s)/deciliter  glucagon  Injectable 1 milliGRAM(s) IntraMuscular once PRN Glucose LESS THAN 70 milligrams/deciliter  oxyCODONE    5 mG/acetaminophen 325 mG 1 Tablet(s) Oral every 6 hours PRN Severe Pain (7 - 10)    Allergies    fish (Hives)  No Known Drug Allergies    Intolerances    Vital Signs Last 24 Hrs  T(C): 36.6 (12 Jun 2019 11:46), Max: 36.8 (12 Jun 2019 00:11)  T(F): 97.8 (12 Jun 2019 11:46), Max: 98.2 (12 Jun 2019 00:11)  HR: 59 (12 Jun 2019 11:46) (50 - 59)  BP: 116/78 (12 Jun 2019 11:46) (113/70 - 144/76)  BP(mean): --  RR: 18 (12 Jun 2019 11:46) (17 - 18)  SpO2: 98% (12 Jun 2019 11:46) (93% - 100%)    I&O's Summary    11 Jun 2019 07:01  -  12 Jun 2019 07:00  --------------------------------------------------------  IN: 100 mL / OUT: 0 mL / NET: 100 mL    PHYSICAL EXAM:  TELE: Sinus johnny  Constitutional: NAD, awake and alert, well-developed  HEENT: Moist Mucous Membranes, Anicteric  Pulmonary: Non-labored, breath sounds are clear bilaterally, No wheezing, rales or rhonchi  Cardiovascular: Regular, S1 and S2, No murmurs, rubs, gallops or clicks  Gastrointestinal: Bowel Sounds present, soft, nontender.   Lymph: 2+ BLE edema. No lymphadenopathy.  Extr: Left foot dressing dry and intact  Skin: No visible rashes or ulcers.  Psych:  Mood & affect appropriate    LABS: All Labs Reviewed:                        10.1 6.64  )-----------( 250      ( 12 Jun 2019 06:47 )             31.9                         9.4    6.81  )-----------( 261      ( 11 Jun 2019 05:57 )             30.1                         9.5    6.12  )-----------( 267      ( 10 Paul 2019 05:47 )             30.2     12 Jun 2019 06:47    140    |  103    |  19     ----------------------------<  126    3.9     |  31     |  1.50   11 Jun 2019 05:57    142    |  106    |  17     ----------------------------<  119    4.1     |  31     |  1.50   10 Paul 2019 05:47    142    |  108    |  13     ----------------------------<  139    3.8     |  28     |  1.30     Ca    8.7        12 Jun 2019 06:47  Ca    8.7        11 Jun 2019 05:57  Ca    8.4        10 Paul 2019 05:47  Mg     2.0       10 Paul 2019 05:47    PTT - ( 11 Jun 2019 05:57 )  PTT:36.0 sec    < from: TTE Echo Doppler w/o Cont (05.16.19 @ 09:45) >     EXAM:  ECHO TTE WO CON COMP W DOPPLR         PROCEDURE DATE:  05/16/2019        INTERPRETATION:  INDICATION: Elevated troponin    Blood Pressure 137/62    Height 187.96 cm     Weight 81.6 kg       BSA   2.1 sq m    Dimensions:    LA 4.1       Normal Values: 2.0 - 4.0 cm    Ao 3.6        Normal Values: 2.0 - 3.8 cm  SEPTUM 1.3       Normal Values: 0.6 - 1.2 cm  PWT 1.3       Normal Values: 0.6 - 1.1 cm  LVIDd 6.0         Normal Values: 3.0 - 5.6 cm  LVIDs 3.1         Normal Values: 1.8 - 4.0 cm      OBSERVATIONS:    Mitral Valve: Thickened leaflets, moderate MR.  Aortic Valve/Aorta: Sclerotic trileaflet aortic valve with normal   opening. Trace AI  Tricuspid Valve: normal with mild TR.  Pulmonic Valve: normal  Left Atrium: Mildly dilated  Right Atrium: normal  Left Ventricle: normal LV size and systolic function, estimated LVEF of   65%. No evidence of segmental dysfunction. Basal septal hypertrophy is   present  Right Ventricle: normal size and systolic function.  Pericardium/Pleura: normal, no significant pericardial effusion.    Conclusion:     normal LV size and systolic function, estimated LVEF of 65%. No evidence   of segmental dysfunction  Normal RV size and systolic function.   Mild left atrial enlargement  Sclerotic trileaflet aortic valve with normal opening. Trace AI  Mitral valve with thickened leaflets and moderate MR  Mild TR  No significant pericardial effusion.      MIROSLAVA MADDY   This document has been electronically signed. May 17 2019  7:45AM     < end of copied text >    < from: CT Angio Chest w/ IV Cont (06.06.19 @ 00:07) >    EXAM:  CT ANGIO CHEST (W)AW IC                          PROCEDURE DATE:  06/06/2019      INTERPRETATION:  CLINICAL INFORMATION: Chest pain.    COMPARISON: None.    PROCEDURE:   CT Angiography of the Chest.  95 ml of Omnipaque 350 was injectedintravenously. 5 ml were discarded.  Sagittal and coronal reformats were performed as well as 3D (MIP)   reconstructions.    FINDINGS:    LUNGS AND AIRWAYS: Patent central airways.  Paraseptal and centrilobular   emphysema. Few scattered small pulmonary nodules up to 4 mm within the   right lower lobe along the major fissure (series 3, image 61). Bilateral   lower lobe subsegmental atelectasis.    PLEURA: Trace left pleural effusion.    MEDIASTINUM AND ANAYA: No lymphadenopathy.    VESSELS: Adequate opacification the pulmonary arteries. Subsegmental   right upper lobe filling defects consistent with pulmonary emboli.   Question subsegmental right middle lobe pulmonary embolus.    HEART: Heart size is normal. No pericardial effusion. Coronaryartery   calcification.    CHEST WALL AND LOWER NECK: Mild bilateral gynecomastia.     VISUALIZED UPPER ABDOMEN: Midline postsurgical change within the upper   abdominal wall.    BONES: Chronic fracture deformity of the left posterolateral eighth and  ninth ribs. Multilevel degenerative changes of the spine.    IMPRESSION:     Subsegmental right upper lobe pulmonary emboli. Question right middle   lobe subsegmental pulmonary embolus.    Emphysema.    Few scattered pulmonary nodules measuring up to 4 mm. 1 year interval   noncontrast chest CT may be obtained to demonstrate stability.    Dr. Flores discussed the above findings with Dr. Sauceda at 1:04 AM on   6/6/2019 with read back.    ABHISHEK FLORES M.D., ATTENDING RADIOLOGIST  This document has been electronically signed. Jun 6 2019  1:09AM     < end of copied text >    < from: Xray Chest 1 View AP/PA (06.05.19 @ 18:48) >    EXAM:  XR CHEST AP OR PA 1V                          PROCEDURE DATE:  06/05/2019      INTERPRETATION:  Chest pain.    AP chest. Prior 5/12/2019.    Heart magnified by projection. Symmetrically hyperinflated lungs.   Nonspecific accentuated bibasilar markings likely reflect fibrotic   atelectatic changes. No focal consolidation or pleural effusion.    Impression: Hyperinflated lungs. No active infiltrates.    WENDI HANSON M.D., ATTENDING RADIOLOGIST  This document has beenelectronically signed. Jun 6 2019  8:37AM      < end of copied text >

## 2019-06-12 NOTE — PROGRESS NOTE ADULT - SUBJECTIVE AND OBJECTIVE BOX
Date/Time Patient Seen:  		  Referring MD:   Data Reviewed	       Patient is a 48y old  Male who presents with a chief complaint of PE (11 Jun 2019 14:10)      Subjective/HPI     PAST MEDICAL & SURGICAL HISTORY:  BPH (benign prostatic hyperplasia)  Hypertension  Afib  Diabetes mellitus with no complication  No pertinent past medical history  H/O abdominal surgery  No significant past surgical history        Medication list         MEDICATIONS  (STANDING):  amiodarone    Tablet 100 milliGRAM(s) Oral daily  apixaban 10 milliGRAM(s) Oral every 12 hours  aspirin enteric coated 81 milliGRAM(s) Oral daily  atorvastatin 80 milliGRAM(s) Oral at bedtime  dextrose 5%. 1000 milliLiter(s) (50 mL/Hr) IV Continuous <Continuous>  dextrose 50% Injectable 12.5 Gram(s) IV Push once  dextrose 50% Injectable 25 Gram(s) IV Push once  dextrose 50% Injectable 25 Gram(s) IV Push once  docusate sodium 100 milliGRAM(s) Oral three times a day  insulin glargine Injectable (LANTUS) 10 Unit(s) SubCutaneous at bedtime  insulin lispro (HumaLOG) corrective regimen sliding scale   SubCutaneous three times a day before meals  insulin lispro (HumaLOG) corrective regimen sliding scale   SubCutaneous at bedtime  insulin lispro Injectable (HumaLOG) 3 Unit(s) SubCutaneous three times a day before meals  nicotine - 21 mG/24Hr(s) Patch 1 patch Transdermal daily  pantoprazole    Tablet 40 milliGRAM(s) Oral two times a day  polyethylene glycol 3350 17 Gram(s) Oral at bedtime  senna 2 Tablet(s) Oral at bedtime  sucralfate 1 Gram(s) Oral two times a day  tamsulosin 0.4 milliGRAM(s) Oral at bedtime    MEDICATIONS  (PRN):  ALBUTerol    90 MICROgram(s) HFA Inhaler 2 Puff(s) Inhalation every 6 hours PRN Shortness of Breath and/or Wheezing  dextrose 40% Gel 15 Gram(s) Oral once PRN Blood Glucose LESS THAN 70 milliGRAM(s)/deciliter  glucagon  Injectable 1 milliGRAM(s) IntraMuscular once PRN Glucose LESS THAN 70 milligrams/deciliter         Vitals log        ICU Vital Signs Last 24 Hrs  T(C): 36.6 (12 Jun 2019 07:19), Max: 36.8 (11 Jun 2019 15:28)  T(F): 97.9 (12 Jun 2019 07:19), Max: 98.2 (11 Jun 2019 15:28)  HR: 56 (12 Jun 2019 07:19) (50 - 56)  BP: 113/70 (12 Jun 2019 07:19) (112/71 - 144/76)  BP(mean): --  ABP: --  ABP(mean): --  RR: 18 (12 Jun 2019 07:19) (17 - 18)  SpO2: 93% (12 Jun 2019 07:19) (93% - 100%)           Input and Output:  I&O's Detail    11 Jun 2019 07:01  -  12 Jun 2019 07:00  --------------------------------------------------------  IN:    Oral Fluid: 100 mL  Total IN: 100 mL    OUT:  Total OUT: 0 mL    Total NET: 100 mL          Lab Data                        10.1   6.64  )-----------( 250      ( 12 Jun 2019 06:47 )             31.9     06-12    140  |  103  |  19  ----------------------------<  126<H>  3.9   |  31  |  1.50<H>    Ca    8.7      12 Jun 2019 06:47              Review of Systems	      Objective     Physical Examination    heart s1s2  lung dec BS      Pertinent Lab findings & Imaging      Ale:  NO   Adequate UO     I&O's Detail    11 Jun 2019 07:01  -  12 Jun 2019 07:00  --------------------------------------------------------  IN:    Oral Fluid: 100 mL  Total IN: 100 mL    OUT:  Total OUT: 0 mL    Total NET: 100 mL               Discussed with:     Cultures:	        Radiology

## 2019-06-13 ENCOUNTER — TRANSCRIPTION ENCOUNTER (OUTPATIENT)
Age: 49
End: 2019-06-13

## 2019-06-13 VITALS
HEART RATE: 51 BPM | SYSTOLIC BLOOD PRESSURE: 114 MMHG | OXYGEN SATURATION: 100 % | DIASTOLIC BLOOD PRESSURE: 72 MMHG | RESPIRATION RATE: 18 BRPM | TEMPERATURE: 98 F

## 2019-06-13 LAB
ANION GAP SERPL CALC-SCNC: 5 MMOL/L — SIGNIFICANT CHANGE UP (ref 5–17)
BUN SERPL-MCNC: 19 MG/DL — SIGNIFICANT CHANGE UP (ref 7–23)
CALCIUM SERPL-MCNC: 8.8 MG/DL — SIGNIFICANT CHANGE UP (ref 8.5–10.1)
CHLORIDE SERPL-SCNC: 106 MMOL/L — SIGNIFICANT CHANGE UP (ref 96–108)
CO2 SERPL-SCNC: 31 MMOL/L — SIGNIFICANT CHANGE UP (ref 22–31)
CREAT SERPL-MCNC: 1.3 MG/DL — SIGNIFICANT CHANGE UP (ref 0.5–1.3)
GLUCOSE SERPL-MCNC: 116 MG/DL — HIGH (ref 70–99)
HCT VFR BLD CALC: 31.1 % — LOW (ref 39–50)
HGB BLD-MCNC: 9.9 G/DL — LOW (ref 13–17)
MCHC RBC-ENTMCNC: 31.2 PG — SIGNIFICANT CHANGE UP (ref 27–34)
MCHC RBC-ENTMCNC: 31.8 GM/DL — LOW (ref 32–36)
MCV RBC AUTO: 98.1 FL — SIGNIFICANT CHANGE UP (ref 80–100)
NRBC # BLD: 0 /100 WBCS — SIGNIFICANT CHANGE UP (ref 0–0)
PLATELET # BLD AUTO: 242 K/UL — SIGNIFICANT CHANGE UP (ref 150–400)
POTASSIUM SERPL-MCNC: 4.2 MMOL/L — SIGNIFICANT CHANGE UP (ref 3.5–5.3)
POTASSIUM SERPL-SCNC: 4.2 MMOL/L — SIGNIFICANT CHANGE UP (ref 3.5–5.3)
RBC # BLD: 3.17 M/UL — LOW (ref 4.2–5.8)
RBC # FLD: 14.1 % — SIGNIFICANT CHANGE UP (ref 10.3–14.5)
SODIUM SERPL-SCNC: 142 MMOL/L — SIGNIFICANT CHANGE UP (ref 135–145)
WBC # BLD: 6.52 K/UL — SIGNIFICANT CHANGE UP (ref 3.8–10.5)
WBC # FLD AUTO: 6.52 K/UL — SIGNIFICANT CHANGE UP (ref 3.8–10.5)

## 2019-06-13 PROCEDURE — 99232 SBSQ HOSP IP/OBS MODERATE 35: CPT

## 2019-06-13 RX ADMIN — APIXABAN 10 MILLIGRAM(S): 2.5 TABLET, FILM COATED ORAL at 06:10

## 2019-06-13 RX ADMIN — Medication 100 MILLIGRAM(S): at 13:03

## 2019-06-13 RX ADMIN — Medication 3 UNIT(S): at 12:02

## 2019-06-13 RX ADMIN — Medication 1: at 12:02

## 2019-06-13 RX ADMIN — PANTOPRAZOLE SODIUM 40 MILLIGRAM(S): 20 TABLET, DELAYED RELEASE ORAL at 06:10

## 2019-06-13 RX ADMIN — Medication 81 MILLIGRAM(S): at 12:01

## 2019-06-13 RX ADMIN — Medication 1 PATCH: at 12:01

## 2019-06-13 RX ADMIN — Medication 100 MILLIGRAM(S): at 06:10

## 2019-06-13 RX ADMIN — AMIODARONE HYDROCHLORIDE 100 MILLIGRAM(S): 400 TABLET ORAL at 08:54

## 2019-06-13 RX ADMIN — Medication 1 PATCH: at 12:00

## 2019-06-13 RX ADMIN — Medication 1 GRAM(S): at 06:10

## 2019-06-13 RX ADMIN — Medication 1 PATCH: at 07:00

## 2019-06-13 RX ADMIN — Medication 3 UNIT(S): at 08:12

## 2019-06-13 NOTE — PROGRESS NOTE ADULT - ASSESSMENT
48 year old male with PMH of afib, NSTEMI, osteomyelitis,  HTN and DM2, who presented to ED with chest pressure, admitted for management of PE. Patient was recently was admitted to [facility] for perforated Antral Gastric Ulcer, s/p Emergent Exp-lap Omentopexy and plication 5/11. Found to have new onset afib with elevated troponin post-operatively indicative of NSTEMI, started on Amiodarone and converted to sinus rhythm, left foot osteo s/p debridement OR 5/28    Cardio consulted for chest pain, found to have PE     PE (RUL subsegmental)  - Was on Heparin drip.  Discussed with Pulm and GI. GI confirmed okay to start NOAC.  Now on Eliquis per Primary.  Monitor for s/s bleeding  - Venous USD negative.  - Monitor for s/s bleeding    Afib  - He went into Afib post op last admission requiring Amio load  - It was short-lived that it was decided he did not need long term AC, with plans to do an outpatient event monitor  - He remained in NSR throughout his stay prior to discharge  - Now, maintaining SB at 50's on monitor but remains asymptomatic and hemodynamically stable.  Continue lower dose of Amiodarone 100 mg daily   - Had NSVT but no further episodes of NSVT x 3 days  - Monitor electrolytes, replete to keep K>4 and Mag>2  - Continue to monitor on tele.  If HR remains stable, may discontinue telemetry in am     NSTEMI vs demand ischemia  - He had elevated troponins post op from his recent admission, though all his CPK's were negative.  Plan was outpatient ischemic eval  - TTE then showed EF 65% with no segmental wall motion abnormality  - EKG unchanged from admission, no acute ST changes.  His cardiac enzymes this admission are all negative  - Continue 81 mg daily  - Continue statin  - Hold ACEI  - Unable to start BB secondary to persistent bradycardia    Edema  - b/l pitting LE edema L>R may be chronic.  He has no evidence of volume overload apart from his edema.  Pro-BNP = 614  - s/p Lasix 40 mg IV x 1.     - Daily weights    OBI  - Creatinine bumped but improved    HTN  - BP well controlled, not on any BP medications  - monitor vitals closely, can continue amiodarone, atorvastatin    DM2  - Per Primary    - Other cardiovascular workup will depend on clinical course. All other workup per primary team.  - Will follow.

## 2019-06-13 NOTE — PROGRESS NOTE ADULT - REASON FOR ADMISSION
PE

## 2019-06-13 NOTE — PROGRESS NOTE ADULT - SUBJECTIVE AND OBJECTIVE BOX
[facility]    Bolivar Carbajal, El, Brittany, Zahra, Reynaldo, Román      [phone]    CHIEF COMPLAINT: Patient is a 48y old  Male who presents with a chief complaint of PE (13 Jun 2019 11:42)      Follow Up: pe, paf on amio    Interim history: The patient reports no new symptoms.  Denies chest discomfort and shortness of breath.  No abdominal pain.  No new neurologic symptoms.      MEDICATIONS  (STANDING):  amiodarone    Tablet 100 milliGRAM(s) Oral daily  apixaban 10 milliGRAM(s) Oral every 12 hours  aspirin enteric coated 81 milliGRAM(s) Oral daily  atorvastatin 80 milliGRAM(s) Oral at bedtime  dextrose 5%. 1000 milliLiter(s) (50 mL/Hr) IV Continuous <Continuous>  dextrose 50% Injectable 12.5 Gram(s) IV Push once  dextrose 50% Injectable 25 Gram(s) IV Push once  dextrose 50% Injectable 25 Gram(s) IV Push once  docusate sodium 100 milliGRAM(s) Oral three times a day  insulin glargine Injectable (LANTUS) 10 Unit(s) SubCutaneous at bedtime  insulin lispro (HumaLOG) corrective regimen sliding scale   SubCutaneous three times a day before meals  insulin lispro (HumaLOG) corrective regimen sliding scale   SubCutaneous at bedtime  insulin lispro Injectable (HumaLOG) 3 Unit(s) SubCutaneous three times a day before meals  nicotine - 21 mG/24Hr(s) Patch 1 patch Transdermal daily  pantoprazole    Tablet 40 milliGRAM(s) Oral two times a day  polyethylene glycol 3350 17 Gram(s) Oral at bedtime  senna 2 Tablet(s) Oral at bedtime  sucralfate 1 Gram(s) Oral two times a day  tamsulosin 0.4 milliGRAM(s) Oral at bedtime    MEDICATIONS  (PRN):  ALBUTerol    90 MICROgram(s) HFA Inhaler 2 Puff(s) Inhalation every 6 hours PRN Shortness of Breath and/or Wheezing  dextrose 40% Gel 15 Gram(s) Oral once PRN Blood Glucose LESS THAN 70 milliGRAM(s)/deciliter  glucagon  Injectable 1 milliGRAM(s) IntraMuscular once PRN Glucose LESS THAN 70 milligrams/deciliter  oxyCODONE    5 mG/acetaminophen 325 mG 1 Tablet(s) Oral every 6 hours PRN Severe Pain (7 - 10)      REVIEW OF SYSTEMS:  eye, ent, GI, , allergic, dermatologic, musculoskeletal and neurologic are negative except as described above    Vital Signs Last 24 Hrs  T(C): 36.8 (13 Jun 2019 16:00), Max: 37.1 (12 Jun 2019 23:53)  T(F): 98.2 (13 Jun 2019 16:00), Max: 98.8 (12 Jun 2019 23:53)  HR: 51 (13 Jun 2019 16:00) (51 - 59)  BP: 114/72 (13 Jun 2019 16:00) (114/71 - 144/83)  BP(mean): --  RR: 18 (13 Jun 2019 16:00) (18 - 18)  SpO2: 100% (13 Jun 2019 16:00) (96% - 100%)    I&O's Summary    12 Jun 2019 07:01  -  13 Jun 2019 07:00  --------------------------------------------------------  IN: 100 mL / OUT: 0 mL / NET: 100 mL    13 Jun 2019 07:01  -  13 Jun 2019 16:42  --------------------------------------------------------  IN: 240 mL / OUT: 0 mL / NET: 240 mL        Telemetry past 24h: sb    PHYSICAL EXAM:    Constitutional: well-nourished, well-developed, NAD   HEENT:  MMM, sclerae anicteric, conjunctivae clear, no oral cyanosis.  Pulmonary: Non-labored, breath sounds are clear bilaterally, No wheezing, rales or rhonchi  Cardiovascular: Regular, S1 and S2.  No murmur.  No rubs, gallops or clicks  Gastrointestinal: Bowel Sounds present, soft, nontender.   Lymph: mod peripheral edema.   Neurological: Alert, no focal deficits  Skin: No rashes.  Psych:  Mood & affect appropriate    LABS: All Labs Reviewed:                        9.9    6.52  )-----------( 242      ( 13 Jun 2019 07:15 )             31.1                         10.1   6.64  )-----------( 250      ( 12 Jun 2019 06:47 )             31.9                         9.4    6.81  )-----------( 261      ( 11 Jun 2019 05:57 )             30.1     13 Jun 2019 07:15    142    |  106    |  19     ----------------------------<  116    4.2     |  31     |  1.30   12 Jun 2019 06:47    140    |  103    |  19     ----------------------------<  126    3.9     |  31     |  1.50   11 Jun 2019 05:57    142    |  106    |  17     ----------------------------<  119    4.1     |  31     |  1.50     Ca    8.8        13 Jun 2019 07:15  Ca    8.7        12 Jun 2019 06:47  Ca    8.7        11 Jun 2019 05:57            Blood Culture:         RADIOLOGY:    EKG:    Echo:

## 2019-06-13 NOTE — PROGRESS NOTE ADULT - PROBLEM SELECTOR PROBLEM 1
Anemia
Diabetes mellitus type 2, uncontrolled
Pulmonary embolism

## 2019-06-13 NOTE — PROGRESS NOTE ADULT - ATTENDING COMMENTS
Patient seen and evaluated  Dressing changed, stable  Weightbearing: full  Please have patient follow up with Dr. Hale in the wound care center within 1 week of discharge
Patient seen and evaluated  Dressing changed, stable  Weightbearing: full  Please have patient follow up with Dr. Hale in the wound care center within 1 week of discharge.     Wound care instructions to left foot amputation site wound:  1. cleanse with normal saline, pat dry  2. apply a dry dressing consisting of gauze, kerlix wrap, and ace wrap  3. change daily
Patient seen and evaluated  Sutures removed aseptically, dressed in betadine, DSD  Weightbearing: full  Please have patient follow up with Dr. Hale in the wound care center within 1 week of discharge    Wound care instructions for left hallux amputation site wound  wash wound with normal saline  apply dry sterile dressing consisting of gauze, kerlix wrap and ace wrap  change daily
Chart reviewed    Patient seen and examined    Agree with plans outlined above
The patient was personally seen and examined, in addition to being examined and evaluated by NP.  All elements of the note were edited where appropriate.
Seen/examined. agree with above.
The patient was personally seen and examined, in addition to being examined and evaluated by NP.  All elements of the note were edited where appropriate.
The patient was personally seen and examined, in addition to being examined and evaluated by NP.  All elements of the note were edited where appropriate.
Advanced care planning was discussed with patient and family.  Advanced care planning forms were reviewed and discussed.  Risks, benefits and alternatives of gastroenterologic procedures were discussed in detail and all questions were answered.    30 minutes spent.
I personally saw and examined the patient in detail.  I have spoken to the above provider regarding the assessment and plan of care.  I reviewed the above assessment and plan of care, and agree.  I have made changes in the body of the note where appropriate.
Advanced care planning was discussed with patient and family.  Advanced care planning forms were reviewed and discussed.  Risks, benefits and alternatives of gastroenterologic procedures were discussed in detail and all questions were answered.    30 minutes spent.
all consults appreciated  pt is PE will need oral AC--eliquis  but in light of the history of the operforated gastric ulcer history  GI f/u to whether to switch to NOAC
all consults appreciated  pt is PE will need oral AC--eliquis  but in light of the history of the operforated gastric ulcer history  GI f/u to whether to switch to NOAC
dc planning
sc planning for ELIAS
all consults appreciated  pt is PE will need oral AC--eliquis  but in light of the history of the operforated gastric ulcer history  will have GI evaluate the patient and possible scope prior to AC therapy  [facility] called.

## 2019-06-13 NOTE — PROGRESS NOTE ADULT - PROBLEM SELECTOR PLAN 1
cont lantus 10 units qhs  cont humalog 3 units 3x/day before meals  cont humalog scale coverage  cont cons cho diet  goal bg 100-180 in hosp setting

## 2019-06-13 NOTE — PROGRESS NOTE ADULT - SUBJECTIVE AND OBJECTIVE BOX
CAPILLARY BLOOD GLUCOSE      POCT Blood Glucose.: 121 mg/dL (13 Jun 2019 07:46)  POCT Blood Glucose.: 178 mg/dL (12 Jun 2019 22:32)  POCT Blood Glucose.: 132 mg/dL (12 Jun 2019 17:14)      Vital Signs Last 24 Hrs  T(C): 36.8 (13 Jun 2019 11:05), Max: 37.1 (12 Jun 2019 15:54)  T(F): 98.3 (13 Jun 2019 11:05), Max: 98.8 (12 Jun 2019 23:53)  HR: 55 (13 Jun 2019 11:05) (53 - 59)  BP: 124/78 (13 Jun 2019 11:05) (114/71 - 144/83)  BP(mean): --  RR: 18 (13 Jun 2019 11:05) (18 - 18)  SpO2: 98% (13 Jun 2019 11:05) (96% - 99%)    General: WN/WD NAD  Respiratory: CTA B/L  CV: RRR, S1S2, no murmurs, rubs or gallops  Abdominal: Soft, NT, ND +BS, Last BM  Extremities: le foot dsg intact     06-13    142  |  106  |  19  ----------------------------<  116<H>  4.2   |  31  |  1.30    Ca    8.8      13 Jun 2019 07:15        atorvastatin 80 milliGRAM(s) Oral at bedtime  dextrose 40% Gel 15 Gram(s) Oral once PRN  dextrose 50% Injectable 12.5 Gram(s) IV Push once  dextrose 50% Injectable 25 Gram(s) IV Push once  dextrose 50% Injectable 25 Gram(s) IV Push once  glucagon  Injectable 1 milliGRAM(s) IntraMuscular once PRN  insulin glargine Injectable (LANTUS) 10 Unit(s) SubCutaneous at bedtime  insulin lispro (HumaLOG) corrective regimen sliding scale   SubCutaneous three times a day before meals  insulin lispro (HumaLOG) corrective regimen sliding scale   SubCutaneous at bedtime  insulin lispro Injectable (HumaLOG) 3 Unit(s) SubCutaneous three times a day before meals

## 2019-06-13 NOTE — PROGRESS NOTE ADULT - SUBJECTIVE AND OBJECTIVE BOX
Date/Time Patient Seen:  		  Referring MD:   Data Reviewed	       Patient is a 48y old  Male who presents with a chief complaint of PE (12 Jun 2019 15:38)      Subjective/HPI     PAST MEDICAL & SURGICAL HISTORY:  BPH (benign prostatic hyperplasia)  Hypertension  Afib  Diabetes mellitus with no complication  No pertinent past medical history  H/O abdominal surgery  No significant past surgical history        Medication list         MEDICATIONS  (STANDING):  amiodarone    Tablet 100 milliGRAM(s) Oral daily  apixaban 10 milliGRAM(s) Oral every 12 hours  aspirin enteric coated 81 milliGRAM(s) Oral daily  atorvastatin 80 milliGRAM(s) Oral at bedtime  dextrose 5%. 1000 milliLiter(s) (50 mL/Hr) IV Continuous <Continuous>  dextrose 50% Injectable 12.5 Gram(s) IV Push once  dextrose 50% Injectable 25 Gram(s) IV Push once  dextrose 50% Injectable 25 Gram(s) IV Push once  docusate sodium 100 milliGRAM(s) Oral three times a day  insulin glargine Injectable (LANTUS) 10 Unit(s) SubCutaneous at bedtime  insulin lispro (HumaLOG) corrective regimen sliding scale   SubCutaneous three times a day before meals  insulin lispro (HumaLOG) corrective regimen sliding scale   SubCutaneous at bedtime  insulin lispro Injectable (HumaLOG) 3 Unit(s) SubCutaneous three times a day before meals  nicotine - 21 mG/24Hr(s) Patch 1 patch Transdermal daily  pantoprazole    Tablet 40 milliGRAM(s) Oral two times a day  polyethylene glycol 3350 17 Gram(s) Oral at bedtime  senna 2 Tablet(s) Oral at bedtime  sucralfate 1 Gram(s) Oral two times a day  tamsulosin 0.4 milliGRAM(s) Oral at bedtime    MEDICATIONS  (PRN):  ALBUTerol    90 MICROgram(s) HFA Inhaler 2 Puff(s) Inhalation every 6 hours PRN Shortness of Breath and/or Wheezing  dextrose 40% Gel 15 Gram(s) Oral once PRN Blood Glucose LESS THAN 70 milliGRAM(s)/deciliter  glucagon  Injectable 1 milliGRAM(s) IntraMuscular once PRN Glucose LESS THAN 70 milligrams/deciliter  oxyCODONE    5 mG/acetaminophen 325 mG 1 Tablet(s) Oral every 6 hours PRN Severe Pain (7 - 10)         Vitals log        ICU Vital Signs Last 24 Hrs  T(C): 36.8 (13 Jun 2019 04:34), Max: 37.1 (12 Jun 2019 15:54)  T(F): 98.3 (13 Jun 2019 04:34), Max: 98.8 (12 Jun 2019 23:53)  HR: 53 (13 Jun 2019 04:34) (53 - 59)  BP: 132/79 (13 Jun 2019 04:34) (113/70 - 144/83)  BP(mean): --  ABP: --  ABP(mean): --  RR: 18 (13 Jun 2019 04:34) (18 - 18)  SpO2: 97% (13 Jun 2019 04:34) (93% - 99%)           Input and Output:  I&O's Detail    11 Jun 2019 07:01  -  12 Jun 2019 07:00  --------------------------------------------------------  IN:    Oral Fluid: 100 mL  Total IN: 100 mL    OUT:  Total OUT: 0 mL    Total NET: 100 mL      12 Jun 2019 07:01  -  13 Jun 2019 06:31  --------------------------------------------------------  IN:    Oral Fluid: 100 mL  Total IN: 100 mL    OUT:  Total OUT: 0 mL    Total NET: 100 mL          Lab Data                        10.1   6.64  )-----------( 250      ( 12 Jun 2019 06:47 )             31.9     06-12    140  |  103  |  19  ----------------------------<  126<H>  3.9   |  31  |  1.50<H>    Ca    8.7      12 Jun 2019 06:47              Review of Systems	      Objective     Physical Examination  on room air  snoring noted on exam - during sleep  heart s1s2  lung dec BS        Pertinent Lab findings & Imaging      Ale:  NO   Adequate UO     I&O's Detail    11 Jun 2019 07:01  -  12 Jun 2019 07:00  --------------------------------------------------------  IN:    Oral Fluid: 100 mL  Total IN: 100 mL    OUT:  Total OUT: 0 mL    Total NET: 100 mL      12 Jun 2019 07:01  -  13 Jun 2019 06:31  --------------------------------------------------------  IN:    Oral Fluid: 100 mL  Total IN: 100 mL    OUT:  Total OUT: 0 mL    Total NET: 100 mL               Discussed with:     Cultures:	        Radiology

## 2019-06-13 NOTE — PROGRESS NOTE ADULT - PROBLEM SELECTOR PLAN 1
seen and examined, pain meds adjusted -   on room air  on Eliquis for PE -   am labs pending, on room air  -   cardio follow up noted  on room air  increase activity - check sat on room air on exertion -    PE, PAF, GI bleed  Anemia  GI eval noted  out of bed as tolerated  increase activity  serial Hgb  cvs regimen and AF - rate control and BP control  tolerating room air  smoking cess ed - counseling - Bronchodilators for emphysema  will need PFT as outpatient - and CT chest in 6 months for pulm nodules follow up.

## 2019-06-13 NOTE — PROGRESS NOTE ADULT - SUBJECTIVE AND OBJECTIVE BOX
49yo male seen bedside during AM podiatry rounds for dressing change.    Vital Signs Last 24 Hrs  T(C): 36.6 (13 Jun 2019 07:47), Max: 37.1 (12 Jun 2019 15:54)  T(F): 97.8 (13 Jun 2019 07:47), Max: 98.8 (12 Jun 2019 23:53)  HR: 54 (13 Jun 2019 07:47) (53 - 59)  BP: 130/76 (13 Jun 2019 07:47) (114/71 - 144/83)  BP(mean): --  RR: 18 (13 Jun 2019 07:47) (18 - 18)  SpO2: 96% (13 Jun 2019 07:47) (96% - 99%)                          9.9    6.52  )-----------( 242      ( 13 Jun 2019 07:15 )             31.1     06-13    142  |  106  |  19  ----------------------------<  116<H>  4.2   |  31  |  1.30    Ca    8.8      13 Jun 2019 07:15         Objective: left foot focused  Vasc: DP and PT palpable; cap refill time < 3 seconds to all remaining digits; temp gradient within normal limits; no edema  Derm:  -surgical wound to hallux amputation site, no sutures, dehiscence noted to the middle and distal aspects; mild serous drainage noted; no malodor; to subcutaneous tissue, no bone exposed  Neuro: epicritic sensation diminished to all digits  Ortho: s/p hallux and partial 1st metatarsal amputation, primarily closed

## 2019-06-13 NOTE — DISCHARGE NOTE NURSING/CASE MANAGEMENT/SOCIAL WORK - NSDCDPATPORTLINK_GEN_ALL_CORE
You can access the [facility] Patient Portal, offered by [facility], by registering with the following website: [email or web address]

## 2019-06-13 NOTE — PROGRESS NOTE ADULT - PROVIDER SPECIALTY LIST ADULT
Cardiology
Endocrinology
Gastroenterology
Internal Medicine
Podiatry
Pulmonology
Gastroenterology
Internal Medicine

## 2019-06-13 NOTE — PROGRESS NOTE ADULT - PROBLEM SELECTOR PROBLEM 2
Anemia
Pulmonary embolism
Anemia
Pulmonary embolism
Anemia

## 2019-06-13 NOTE — PROGRESS NOTE ADULT - PROBLEM SELECTOR PLAN 1
no overt gi bleed, hgb stable, reports passing brown stool  cont ppi/carafate  monitor cbc, transfuse prn  no gi objection to ac  will follow, dc planning in progress

## 2019-06-13 NOTE — PROGRESS NOTE ADULT - SUBJECTIVE AND OBJECTIVE BOX
INTERVAL HPI/OVERNIGHT EVENTS:  pt seen and examined  denies n/v/abd pain  +brown bm yesterday per pt    MEDICATIONS  (STANDING):  amiodarone    Tablet 100 milliGRAM(s) Oral daily  apixaban 10 milliGRAM(s) Oral every 12 hours  aspirin enteric coated 81 milliGRAM(s) Oral daily  atorvastatin 80 milliGRAM(s) Oral at bedtime  dextrose 5%. 1000 milliLiter(s) (50 mL/Hr) IV Continuous <Continuous>  dextrose 50% Injectable 12.5 Gram(s) IV Push once  dextrose 50% Injectable 25 Gram(s) IV Push once  dextrose 50% Injectable 25 Gram(s) IV Push once  docusate sodium 100 milliGRAM(s) Oral three times a day  insulin glargine Injectable (LANTUS) 10 Unit(s) SubCutaneous at bedtime  insulin lispro (HumaLOG) corrective regimen sliding scale   SubCutaneous three times a day before meals  insulin lispro (HumaLOG) corrective regimen sliding scale   SubCutaneous at bedtime  insulin lispro Injectable (HumaLOG) 3 Unit(s) SubCutaneous three times a day before meals  nicotine - 21 mG/24Hr(s) Patch 1 patch Transdermal daily  pantoprazole    Tablet 40 milliGRAM(s) Oral two times a day  polyethylene glycol 3350 17 Gram(s) Oral at bedtime  senna 2 Tablet(s) Oral at bedtime  sucralfate 1 Gram(s) Oral two times a day  tamsulosin 0.4 milliGRAM(s) Oral at bedtime    MEDICATIONS  (PRN):  ALBUTerol    90 MICROgram(s) HFA Inhaler 2 Puff(s) Inhalation every 6 hours PRN Shortness of Breath and/or Wheezing  dextrose 40% Gel 15 Gram(s) Oral once PRN Blood Glucose LESS THAN 70 milliGRAM(s)/deciliter  glucagon  Injectable 1 milliGRAM(s) IntraMuscular once PRN Glucose LESS THAN 70 milligrams/deciliter  oxyCODONE    5 mG/acetaminophen 325 mG 1 Tablet(s) Oral every 6 hours PRN Severe Pain (7 - 10)      Allergies    fish (Hives)  No Known Drug Allergies    Intolerances        Review of Systems:    General:  No wt loss, fevers, chills, night sweats, fatigue   Eyes:  Good vision, no reported pain  ENT:  No sore throat, pain, runny nose, dysphagia  CV:  No pain, palpitations, hypo/hypertension  Resp:  No dyspnea, cough, tachypnea, wheezing  GI:  No pain, No nausea, No vomiting, No diarrhea, No constipation, No weight loss, No fever, No pruritis, No rectal bleeding, No melena, No dysphagia  :  No pain, bleeding, incontinence, nocturia  Muscle:  No pain, weakness  Neuro:  No weakness, tingling, memory problems  Psych:  No fatigue, insomnia, mood problems, depression  Endocrine:  No polyuria, polydypsia, cold/heat intolerance  Heme:  No petechiae, ecchymosis, easy bruisability  Skin:  No rash, tattoos, scars, edema      Vital Signs Last 24 Hrs  T(C): 36.6 (13 Jun 2019 07:47), Max: 37.1 (12 Jun 2019 15:54)  T(F): 97.8 (13 Jun 2019 07:47), Max: 98.8 (12 Jun 2019 23:53)  HR: 54 (13 Jun 2019 07:47) (53 - 59)  BP: 130/76 (13 Jun 2019 07:47) (114/71 - 144/83)  BP(mean): --  RR: 18 (13 Jun 2019 07:47) (18 - 18)  SpO2: 96% (13 Jun 2019 07:47) (96% - 99%)    PHYSICAL EXAM:    Constitutional: NAD  HEENT: ncat  Neck: No LAD  Respiratory: dec bs  Cardiovascular: S1 and S2, RRR  Gastrointestinal: soft nt mild dt  Extremities: edema  Vascular: 2+ peripheral pulses  Neurological: A/O x 3  Skin: No rashes      LABS:                        9.9    6.52  )-----------( 242      ( 13 Jun 2019 07:15 )             31.1     06-13    142  |  106  |  19  ----------------------------<  116<H>  4.2   |  31  |  1.30    Ca    8.8      13 Jun 2019 07:15            RADIOLOGY & ADDITIONAL TESTS:

## 2019-06-17 RX ORDER — INSULIN DETEMIR 100/ML (3)
10 INSULIN PEN (ML) SUBCUTANEOUS
Qty: 0 | Refills: 0 | DISCHARGE

## 2019-07-01 ENCOUNTER — OUTPATIENT (OUTPATIENT)
Dept: OUTPATIENT SERVICES | Facility: HOSPITAL | Age: 49
LOS: 1 days | End: 2019-07-01
Payer: MEDICAID

## 2019-07-01 DIAGNOSIS — Z98.890 OTHER SPECIFIED POSTPROCEDURAL STATES: Chronic | ICD-10-CM

## 2019-07-05 DIAGNOSIS — Z71.89 OTHER SPECIFIED COUNSELING: ICD-10-CM

## 2019-07-05 PROBLEM — E11.9 TYPE 2 DIABETES MELLITUS WITHOUT COMPLICATIONS: Chronic | Status: ACTIVE | Noted: 2019-05-11

## 2019-07-05 PROBLEM — I10 ESSENTIAL (PRIMARY) HYPERTENSION: Chronic | Status: ACTIVE | Noted: 2019-06-05

## 2019-07-05 PROBLEM — I48.91 UNSPECIFIED ATRIAL FIBRILLATION: Chronic | Status: ACTIVE | Noted: 2019-06-05

## 2019-07-05 PROBLEM — N40.0 BENIGN PROSTATIC HYPERPLASIA WITHOUT LOWER URINARY TRACT SYMPTOMS: Chronic | Status: ACTIVE | Noted: 2019-06-05

## 2019-07-24 NOTE — H&P ADULT - NSHPOUTPATIENTPROVIDERS_GEN_ALL_CORE
Encounter Date: 7/19/2019       History     Chief Complaint   Patient presents with    Shortness of Breath     Afib, had nuclear stress test at [facility] today     60-year-old male with history of atrial fibrillation, chronic anticoagulation, presents complaining of a breathlessness and near-syncope episode this evening while sitting down for dinner - following this morning's nuclear medicine stress test (dobutamine) - and heat exposure upon arriving to the [facility] from [facility] and mowing his yard.    In the past 3 days he has held his beta-blocker.    He denies any acute chest pain  While lying supine his symptoms are improved but not resolved  At the bedside with leg elevation his heart rate trends down suggesting some component of volume depletion  He has never had chest pain and the stress test was simply done in anticipation of doing heart catheterization for atrial fibrillation ablation that is upcoming           Current Outpatient Medications:     enalapril (VASOTEC) 10 MG tablet, Take 10 mg by mouth once daily., Disp: , Rfl:     metoprolol tartrate (LOPRESSOR) 50 MG tablet, Take 50 mg by mouth 2 (two) times daily., Disp: , Rfl:     rivaroxaban (XARELTO ORAL), Take by mouth., Disp: , Rfl:     tadalafil (CIALIS) 5 MG tablet, Take 5 mg by mouth daily as needed for Erectile Dysfunction., Disp: , Rfl:     zolpidem (AMBIEN) 5 MG Tab, Take 5 mg by mouth nightly as needed., Disp: , Rfl:             Review of patient's allergies indicates:  No Known Allergies  Past Medical History:   Diagnosis Date    A-fib     Hypertension      History reviewed. No pertinent surgical history.  History reviewed. No pertinent family history.  Social History     Tobacco Use    Smoking status: Never Smoker   Substance Use Topics    Alcohol use: Never     Frequency: Never    Drug use: Never     Review of Systems   Constitutional: Positive for fatigue. Negative for activity change, appetite change, chills,  PMD: Dr. Linette Rico diaphoresis, fever and unexpected weight change.   HENT: Negative.    Eyes: Negative for visual disturbance.   Respiratory: Positive for shortness of breath. Negative for apnea, cough, choking, chest tightness, wheezing and stridor.    Cardiovascular: Positive for palpitations. Negative for chest pain and leg swelling.   Gastrointestinal: Negative.    Genitourinary: Negative.    Musculoskeletal: Negative.    Skin: Negative.    Neurological: Negative.    Hematological: Negative.    All other systems reviewed and are negative.      Physical Exam     Initial Vitals [07/19/19 1942]   BP Pulse Resp Temp SpO2   122/77 92 16 97.9 °F (36.6 °C) 100 %      MAP       --         Physical Exam    Nursing note and vitals reviewed.  Constitutional: He appears well-developed and well-nourished. He is not diaphoretic. No distress.   HENT:   Head: Normocephalic and atraumatic.   Mouth/Throat: Oropharynx is clear and moist.   Eyes: Conjunctivae and EOM are normal. Pupils are equal, round, and reactive to light.   Neck: Neck supple. Carotid bruit is not present. No JVD present.   Cardiovascular: S1 normal, S2 normal, normal heart sounds and intact distal pulses. An irregularly irregular rhythm present.   No extrasystoles are present.  Tachycardia present.  Exam reveals no gallop, no friction rub and no decreased pulses.    No murmur heard.  Pulses:       Carotid pulses are 2+ on the right side, and 2+ on the left side.       Radial pulses are 2+ on the right side, and 2+ on the left side.        Posterior tibial pulses are 2+ on the right side, and 2+ on the left side.   Pulmonary/Chest: Effort normal and breath sounds normal. No respiratory distress. He has no decreased breath sounds. He has no wheezes. He has no rhonchi. He has no rales. He exhibits no tenderness.   Abdominal: Soft. Bowel sounds are normal. He exhibits no distension and no mass. There is no tenderness. There is no rebound and no guarding.   Musculoskeletal: Normal  range of motion. He exhibits no edema or tenderness.   Neurological: He is alert and oriented to person, place, and time. He has normal strength. No sensory deficit.   Skin: Skin is warm and dry. Capillary refill takes less than 2 seconds. No rash noted. No erythema. No pallor.   Psychiatric: He has a normal mood and affect. His behavior is normal. Judgment and thought content normal.         ED Course   Critical Care  Date/Time: 7/19/2019 8:30 PM  Performed by: Keanu Morgan MD  Authorized by: Keanu Morgan MD   Total critical care time (exclusive of procedural time) : 30 minutes        Labs Reviewed   COMPREHENSIVE METABOLIC PANEL - Abnormal; Notable for the following components:       Result Value    CO2 22 (*)     eGFR if non  58.9 (*)     All other components within normal limits   TROPONIN I - Abnormal; Notable for the following components:    Troponin I <0.01 (*)     All other components within normal limits   CBC W/ AUTO DIFFERENTIAL   B-TYPE NATRIURETIC PEPTIDE   MAGNESIUM   MAGNESIUM     Results for orders placed or performed during the hospital encounter of 07/19/19   CBC auto differential   Result Value Ref Range    WBC 8.60 3.90 - 12.70 K/uL    RBC 5.42 4.60 - 6.20 M/uL    Hemoglobin 16.0 14.0 - 18.0 g/dL    Hematocrit 46.7 40.0 - 54.0 %    Mean Corpuscular Volume 86 82 - 98 fL    Mean Corpuscular Hemoglobin 29.5 27.0 - 31.0 pg    Mean Corpuscular Hemoglobin Conc 34.3 32.0 - 36.0 g/dL    RDW 12.8 11.5 - 14.5 %    Platelets 214 150 - 350 K/uL    MPV 9.9 9.2 - 12.9 fL    Immature Granulocytes 0.3 0.0 - 0.5 %    Gran # (ANC) 5.6 1.8 - 7.7 K/uL    Immature Grans (Abs) 0.03 0.00 - 0.04 K/uL    Lymph # 1.7 1.0 - 4.8 K/uL    Mono # 1.0 0.3 - 1.0 K/uL    Eos # 0.2 0.0 - 0.5 K/uL    Baso # 0.05 0.00 - 0.20 K/uL    nRBC 0 0 /100 WBC    Gran% 65.2 38.0 - 73.0 %    Lymph% 19.9 18.0 - 48.0 %    Mono% 12.0 4.0 - 15.0 %    Eosinophil% 2.0 0.0 - 8.0 %    Basophil% 0.6 0.0 - 1.9 %    Differential  Method Automated    Comprehensive metabolic panel   Result Value Ref Range    Sodium 140 136 - 145 mmol/L    Potassium 4.1 3.5 - 5.1 mmol/L    Chloride 103 95 - 110 mmol/L    CO2 22 (L) 23 - 29 mmol/L    Glucose 110 70 - 110 mg/dL    BUN, Bld 22 8 - 23 mg/dL    Creatinine 1.3 0.5 - 1.4 mg/dL    Calcium 9.4 8.7 - 10.5 mg/dL    Total Protein 7.9 6.0 - 8.4 g/dL    Albumin 4.6 3.5 - 5.2 g/dL    Total Bilirubin 0.8 0.1 - 1.0 mg/dL    Alkaline Phosphatase 73 55 - 135 U/L    AST 21 10 - 40 U/L    ALT 27 10 - 44 U/L    Anion Gap 15 8 - 16 mmol/L    eGFR if African American >60.0 >60 mL/min/1.73 m^2    eGFR if non  58.9 (A) >60 mL/min/1.73 m^2   Troponin I   Result Value Ref Range    Troponin I <0.01 (L) 0.02 - 0.50 ng/mL   Brain natriuretic peptide   Result Value Ref Range    BNP 96 0 - 99 pg/mL   Magnesium   Result Value Ref Range    Magnesium 2.2 1.6 - 2.6 mg/dL       EKG Readings: (Independently Interpreted)   EKG obtained reveals atrial fibrillation with ventricular rate of 116 beats per minute no acute ST T wave changes suggestive of ischemia.     ECG Results          EKG 12-lead (Final result)  Result time 07/22/19 13:14:53    Final result by Interface, Lab In [facility] (07/22/19 13:14:53)                 Narrative:    Test Reason : R06.02,    Vent. Rate : 116 BPM     Atrial Rate : 098 BPM     P-R Int : 000 ms          QRS Dur : 098 ms      QT Int : 328 ms       P-R-T Axes : 000 005 001 degrees     QTc Int : 455 ms    Atrial fibrillation with rapid ventricular response with premature  ventricular or aberrantly conducted complexes  Abnormal ECG  No previous ECGs available  Confirmed by Oneil Delaney MD ([id number]) on 7/22/2019 1:14:41 PM    Referred By: [email or web address]   SELF           Confirmed By:Oneil Delaney MD                            Imaging Results          X-Ray Chest AP Portable (Final result)  Result time 07/20/19 07:45:18   Procedure changed from X-Ray Chest PA And Lateral     Final result by Renato Wolff,  MD (07/20/19 07:45:18)                 Impression:      1. No acute chest disease.  2. Cardiomegaly.      Electronically signed by: Renato Wolff  Date:    07/20/2019  Time:    07:45             Narrative:    EXAMINATION:  XR CHEST AP PORTABLE    CLINICAL HISTORY:  oth;. Shortness of breath    TECHNIQUE:  Single frontal portable view of the chest was performed.    COMPARISON:  None    FINDINGS:  Support devices: None    The lungs are clear, with normal appearance of pulmonary vasculature and no pleural effusion or pneumothorax.    The cardiac silhouette is enlarged.  The hilar and mediastinal contours are unremarkable.    Bones are intact.                              X-Rays:   Independently Interpreted Readings:   Chest X-Ray: Normal heart size.  No infiltrates.  No acute abnormalities. No acute fluid overload     Medical Decision Making:   Clinical Tests:   Lab Tests: Ordered and Reviewed  Radiological Study: Ordered and Reviewed  Medical Tests: Ordered and Reviewed  ED Management:  Pt had marked improvement with ED Treatment which was comprised of stepwise care of IVF bolus 500cc and then low dose IV beta blocker - 5mg x 1.    The patient ambulated with me in the [place] from room 7 to room 14 and back continuously and without any acute symptoms   He feels stable for DC rather than admit to observation overnight   I feel he is stable to do so with resuming medications as previous and to contact cardiology re this occurence.          Multiple issues likely contributed to tonight's symptoms - including recently holding the metoprolol, poor intake earlier in the day, and then the heat exposure.    Within the emergency department you were given  500 cc normal saline  5 mg of metoprolol IV    In the morning resume regular medications including  Metoprolol 50 XL  Vasotec 10mg   Xarelto    Laboratory evaluation was reassuring and without any acute abnormality  EKG revealed atrial fibrillation without ischemia  Chest x-ray  was normal without evidence of fluid overload or infiltrate    Follow-up with your cardiologist as planned    Return as needed for any concerning symptoms   Call if any questions       MARIAMA Morgan MD    [phone]                              Clinical Impression:       ICD-10-CM ICD-9-CM   1. Chronic atrial fibrillation I48.2 427.31   2. Shortness of breath R06.02 786.05   3. Hypovolemia E86.1 276.52                                Keanu Morgan MD  07/25/19 0690

## 2019-07-28 ENCOUNTER — EMERGENCY (EMERGENCY)
Facility: HOSPITAL | Age: 49
LOS: 1 days | Discharge: HOME | End: 2019-07-28
Attending: EMERGENCY MEDICINE | Admitting: EMERGENCY MEDICINE
Payer: COMMERCIAL

## 2019-07-28 VITALS
DIASTOLIC BLOOD PRESSURE: 83 MMHG | RESPIRATION RATE: 15 BRPM | HEART RATE: 74 BPM | TEMPERATURE: 98 F | OXYGEN SATURATION: 97 % | SYSTOLIC BLOOD PRESSURE: 142 MMHG

## 2019-07-28 VITALS
HEIGHT: 74 IN | WEIGHT: 195.11 LBS | DIASTOLIC BLOOD PRESSURE: 88 MMHG | OXYGEN SATURATION: 98 % | SYSTOLIC BLOOD PRESSURE: 157 MMHG | RESPIRATION RATE: 18 BRPM | HEART RATE: 68 BPM | TEMPERATURE: 98 F

## 2019-07-28 DIAGNOSIS — Z98.890 OTHER SPECIFIED POSTPROCEDURAL STATES: Chronic | ICD-10-CM

## 2019-07-28 LAB
ALBUMIN SERPL ELPH-MCNC: 3.3 G/DL — SIGNIFICANT CHANGE UP (ref 3.3–5)
ALP SERPL-CCNC: 183 U/L — HIGH (ref 40–120)
ALT FLD-CCNC: 23 U/L — SIGNIFICANT CHANGE UP (ref 12–78)
AMYLASE P1 CFR SERPL: 35 U/L — SIGNIFICANT CHANGE UP (ref 25–125)
ANION GAP SERPL CALC-SCNC: 7 MMOL/L — SIGNIFICANT CHANGE UP (ref 5–17)
APTT BLD: 37.5 SEC — HIGH (ref 28.5–37)
AST SERPL-CCNC: 17 U/L — SIGNIFICANT CHANGE UP (ref 15–37)
BASOPHILS # BLD AUTO: 0.03 K/UL — SIGNIFICANT CHANGE UP (ref 0–0.2)
BASOPHILS NFR BLD AUTO: 0.4 % — SIGNIFICANT CHANGE UP (ref 0–2)
BILIRUB SERPL-MCNC: 0.2 MG/DL — SIGNIFICANT CHANGE UP (ref 0.2–1.2)
BUN SERPL-MCNC: 14 MG/DL — SIGNIFICANT CHANGE UP (ref 7–23)
CALCIUM SERPL-MCNC: 9.3 MG/DL — SIGNIFICANT CHANGE UP (ref 8.5–10.1)
CHLORIDE SERPL-SCNC: 102 MMOL/L — SIGNIFICANT CHANGE UP (ref 96–108)
CO2 SERPL-SCNC: 32 MMOL/L — HIGH (ref 22–31)
CREAT SERPL-MCNC: 1.1 MG/DL — SIGNIFICANT CHANGE UP (ref 0.5–1.3)
EOSINOPHIL # BLD AUTO: 0.29 K/UL — SIGNIFICANT CHANGE UP (ref 0–0.5)
EOSINOPHIL NFR BLD AUTO: 3.4 % — SIGNIFICANT CHANGE UP (ref 0–6)
GLUCOSE SERPL-MCNC: 233 MG/DL — HIGH (ref 70–99)
HCT VFR BLD CALC: 32.3 % — LOW (ref 39–50)
HGB BLD-MCNC: 10.3 G/DL — LOW (ref 13–17)
IMM GRANULOCYTES NFR BLD AUTO: 0.1 % — SIGNIFICANT CHANGE UP (ref 0–1.5)
INR BLD: 1.27 RATIO — HIGH (ref 0.88–1.16)
LACTATE SERPL-SCNC: 1.9 MMOL/L — SIGNIFICANT CHANGE UP (ref 0.7–2)
LIDOCAIN IGE QN: 97 U/L — SIGNIFICANT CHANGE UP (ref 73–393)
LYMPHOCYTES # BLD AUTO: 1.4 K/UL — SIGNIFICANT CHANGE UP (ref 1–3.3)
LYMPHOCYTES # BLD AUTO: 16.6 % — SIGNIFICANT CHANGE UP (ref 13–44)
MCHC RBC-ENTMCNC: 28.5 PG — SIGNIFICANT CHANGE UP (ref 27–34)
MCHC RBC-ENTMCNC: 31.9 GM/DL — LOW (ref 32–36)
MCV RBC AUTO: 89.5 FL — SIGNIFICANT CHANGE UP (ref 80–100)
MONOCYTES # BLD AUTO: 1.01 K/UL — HIGH (ref 0–0.9)
MONOCYTES NFR BLD AUTO: 12 % — SIGNIFICANT CHANGE UP (ref 2–14)
NEUTROPHILS # BLD AUTO: 5.71 K/UL — SIGNIFICANT CHANGE UP (ref 1.8–7.4)
NEUTROPHILS NFR BLD AUTO: 67.5 % — SIGNIFICANT CHANGE UP (ref 43–77)
NRBC # BLD: 0 /100 WBCS — SIGNIFICANT CHANGE UP (ref 0–0)
PLATELET # BLD AUTO: 334 K/UL — SIGNIFICANT CHANGE UP (ref 150–400)
POTASSIUM SERPL-MCNC: 4.4 MMOL/L — SIGNIFICANT CHANGE UP (ref 3.5–5.3)
POTASSIUM SERPL-SCNC: 4.4 MMOL/L — SIGNIFICANT CHANGE UP (ref 3.5–5.3)
PROT SERPL-MCNC: 7.6 G/DL — SIGNIFICANT CHANGE UP (ref 6–8.3)
PROTHROM AB SERPL-ACNC: 14.6 SEC — HIGH (ref 10–12.9)
RBC # BLD: 3.61 M/UL — LOW (ref 4.2–5.8)
RBC # FLD: 13.1 % — SIGNIFICANT CHANGE UP (ref 10.3–14.5)
SODIUM SERPL-SCNC: 141 MMOL/L — SIGNIFICANT CHANGE UP (ref 135–145)
WBC # BLD: 8.45 K/UL — SIGNIFICANT CHANGE UP (ref 3.8–10.5)
WBC # FLD AUTO: 8.45 K/UL — SIGNIFICANT CHANGE UP (ref 3.8–10.5)

## 2019-07-28 PROCEDURE — 74177 CT ABD & PELVIS W/CONTRAST: CPT | Mod: 26

## 2019-07-28 PROCEDURE — 96375 TX/PRO/DX INJ NEW DRUG ADDON: CPT

## 2019-07-28 PROCEDURE — 99284 EMERGENCY DEPT VISIT MOD MDM: CPT

## 2019-07-28 PROCEDURE — 85027 COMPLETE CBC AUTOMATED: CPT

## 2019-07-28 PROCEDURE — 85730 THROMBOPLASTIN TIME PARTIAL: CPT

## 2019-07-28 PROCEDURE — 85610 PROTHROMBIN TIME: CPT

## 2019-07-28 PROCEDURE — 96374 THER/PROPH/DIAG INJ IV PUSH: CPT

## 2019-07-28 PROCEDURE — 83605 ASSAY OF LACTIC ACID: CPT

## 2019-07-28 PROCEDURE — 99284 EMERGENCY DEPT VISIT MOD MDM: CPT | Mod: 25

## 2019-07-28 PROCEDURE — 74177 CT ABD & PELVIS W/CONTRAST: CPT

## 2019-07-28 PROCEDURE — 80053 COMPREHEN METABOLIC PANEL: CPT

## 2019-07-28 PROCEDURE — 82150 ASSAY OF AMYLASE: CPT

## 2019-07-28 PROCEDURE — 83690 ASSAY OF LIPASE: CPT

## 2019-07-28 PROCEDURE — 96361 HYDRATE IV INFUSION ADD-ON: CPT

## 2019-07-28 RX ORDER — MORPHINE SULFATE 50 MG/1
4 CAPSULE, EXTENDED RELEASE ORAL ONCE
Refills: 0 | Status: DISCONTINUED | OUTPATIENT
Start: 2019-07-28 | End: 2019-07-28

## 2019-07-28 RX ORDER — IOHEXOL 300 MG/ML
30 INJECTION, SOLUTION INTRAVENOUS ONCE
Refills: 0 | Status: COMPLETED | OUTPATIENT
Start: 2019-07-28 | End: 2019-07-28

## 2019-07-28 RX ORDER — SODIUM CHLORIDE 9 MG/ML
1000 INJECTION INTRAMUSCULAR; INTRAVENOUS; SUBCUTANEOUS ONCE
Refills: 0 | Status: COMPLETED | OUTPATIENT
Start: 2019-07-28 | End: 2019-07-28

## 2019-07-28 RX ORDER — MULTIVIT WITH MIN/MFOLATE/K2 340-15/3 G
1 POWDER (GRAM) ORAL ONCE
Refills: 0 | Status: COMPLETED | OUTPATIENT
Start: 2019-07-28 | End: 2019-07-28

## 2019-07-28 RX ORDER — ONDANSETRON 8 MG/1
4 TABLET, FILM COATED ORAL ONCE
Refills: 0 | Status: COMPLETED | OUTPATIENT
Start: 2019-07-28 | End: 2019-07-28

## 2019-07-28 RX ADMIN — SODIUM CHLORIDE 1000 MILLILITER(S): 9 INJECTION INTRAMUSCULAR; INTRAVENOUS; SUBCUTANEOUS at 15:04

## 2019-07-28 RX ADMIN — MORPHINE SULFATE 4 MILLIGRAM(S): 50 CAPSULE, EXTENDED RELEASE ORAL at 16:00

## 2019-07-28 RX ADMIN — SODIUM CHLORIDE 1000 MILLILITER(S): 9 INJECTION INTRAMUSCULAR; INTRAVENOUS; SUBCUTANEOUS at 15:51

## 2019-07-28 RX ADMIN — ONDANSETRON 4 MILLIGRAM(S): 8 TABLET, FILM COATED ORAL at 15:58

## 2019-07-28 RX ADMIN — Medication 1 BOTTLE: at 19:54

## 2019-07-28 RX ADMIN — IOHEXOL 30 MILLILITER(S): 300 INJECTION, SOLUTION INTRAVENOUS at 15:59

## 2019-07-28 RX ADMIN — MORPHINE SULFATE 4 MILLIGRAM(S): 50 CAPSULE, EXTENDED RELEASE ORAL at 16:39

## 2019-07-28 NOTE — ED PROVIDER NOTE - ATTENDING CONTRIBUTION TO CARE
Mid abdominal pains x few hours. Perf abdominal surgery 2-months prior by Dr. Mejia. Exam revealed male in mild distress with mild tenderness to palpation epigastric region. I agree with plan and management outlined by PA.

## 2019-07-28 NOTE — ED PROVIDER NOTE - CLINICAL SUMMARY MEDICAL DECISION MAKING FREE TEXT BOX
50 y/o M with hx of recent perforated ulcer sx 2 months ago, HTN, DM, Afib co abdominal pain with n/v x 1 day, had mild BM this morning after enema, few episodes of vomiting early am, had urinary retention and placed on jacques from rehab and sent to ED, states that he feels constipated, no dysuria/fever/hematuria, +diffuse abdominal ttp, no CVAT B, will get labs, ct a/p r/o obstruction, re-assess

## 2019-07-28 NOTE — ED PROVIDER NOTE - OBJECTIVE STATEMENT
48 y/o M with hx of DM, Afib on eliquis, HTN, HLD, BPH presents with c/o abdominal pain x 1 day. Pt states that he is also s/p perforated ulcer surgery 2 months ago by Dr. Mejia. Pt is at [facility], had normal BM yesterday morning, urinated normally in the evening and then around midnight/1am this morning had urge to urinate but couldn't on his own and jacques placed, around 4am enema given with slight BM and then had few episodes of vomiting. States that he has been having abdominal pain since. Denies blood in stool, fever, chills, dysuria/hematuria, back pain, CP, SOB or other symptoms.

## 2019-07-28 NOTE — ED PROVIDER NOTE - PROGRESS NOTE DETAILS
Pt was seen by surgeon, Dr. Mejia in ED, advised if ct scan neg, dc home. Reevaluated patient at bedside.  Patient feeling much improved.  Discussed the results of all diagnostic testing in ED and copies of all reports given. Will give mag citrate and dc back to rehab.  An opportunity to ask questions was given.  Discussed the importance of prompt, close medical follow-up.  Patient will return with any changes, concerns or persistent / worsening symptoms.  Understanding of all instructions verbalized. Pt examined by ED attending, Dr. Lam who agreed with disposition and plan.

## 2019-07-28 NOTE — ED PROVIDER NOTE - PMH
Afib    BPH (benign prostatic hyperplasia)    Diabetes    Diabetes mellitus with no complication    Hypertension

## 2019-07-28 NOTE — ED PROVIDER NOTE - MUSCULOSKELETAL, MLM
Spine appears normal, range of motion is not limited, no muscle or joint tenderness, +sp amputation of L great toe noted

## 2019-07-28 NOTE — ED PROVIDER NOTE - ABDOMINAL TENDER
+mild diffuse abdomen ttp, +well healed old upper midline surgical scar noted, no rebound or guarding, no CVAT B

## 2019-07-30 ENCOUNTER — INPATIENT (INPATIENT)
Facility: HOSPITAL | Age: 49
LOS: 1 days | Discharge: ANOTHER HOSPITAL | DRG: 311 | End: 2019-08-01
Attending: INTERNAL MEDICINE | Admitting: INTERNAL MEDICINE
Payer: COMMERCIAL

## 2019-07-30 VITALS
SYSTOLIC BLOOD PRESSURE: 164 MMHG | DIASTOLIC BLOOD PRESSURE: 82 MMHG | WEIGHT: 195.11 LBS | HEART RATE: 98 BPM | HEIGHT: 74 IN | TEMPERATURE: 99 F | OXYGEN SATURATION: 98 % | RESPIRATION RATE: 20 BRPM

## 2019-07-30 DIAGNOSIS — Z98.890 OTHER SPECIFIED POSTPROCEDURAL STATES: Chronic | ICD-10-CM

## 2019-07-30 LAB
ALBUMIN SERPL ELPH-MCNC: 3.3 G/DL — SIGNIFICANT CHANGE UP (ref 3.3–5)
ALP SERPL-CCNC: 178 U/L — HIGH (ref 40–120)
ALT FLD-CCNC: 24 U/L — SIGNIFICANT CHANGE UP (ref 12–78)
ANION GAP SERPL CALC-SCNC: 11 MMOL/L — SIGNIFICANT CHANGE UP (ref 5–17)
APTT BLD: 35.2 SEC — SIGNIFICANT CHANGE UP (ref 28.5–37)
AST SERPL-CCNC: 17 U/L — SIGNIFICANT CHANGE UP (ref 15–37)
BILIRUB SERPL-MCNC: 0.2 MG/DL — SIGNIFICANT CHANGE UP (ref 0.2–1.2)
BUN SERPL-MCNC: 15 MG/DL — SIGNIFICANT CHANGE UP (ref 7–23)
CALCIUM SERPL-MCNC: 8.9 MG/DL — SIGNIFICANT CHANGE UP (ref 8.5–10.1)
CHLORIDE SERPL-SCNC: 102 MMOL/L — SIGNIFICANT CHANGE UP (ref 96–108)
CK MB CFR SERPL CALC: 2.8 NG/ML — SIGNIFICANT CHANGE UP (ref 0–3.6)
CO2 SERPL-SCNC: 24 MMOL/L — SIGNIFICANT CHANGE UP (ref 22–31)
CREAT SERPL-MCNC: 1.4 MG/DL — HIGH (ref 0.5–1.3)
D DIMER BLD IA.RAPID-MCNC: 478 NG/ML DDU — HIGH
GLUCOSE SERPL-MCNC: 259 MG/DL — HIGH (ref 70–99)
HCT VFR BLD CALC: 32.8 % — LOW (ref 39–50)
HGB BLD-MCNC: 10.2 G/DL — LOW (ref 13–17)
INR BLD: 1.16 RATIO — SIGNIFICANT CHANGE UP (ref 0.88–1.16)
MCHC RBC-ENTMCNC: 27.6 PG — SIGNIFICANT CHANGE UP (ref 27–34)
MCHC RBC-ENTMCNC: 31.1 GM/DL — LOW (ref 32–36)
MCV RBC AUTO: 88.6 FL — SIGNIFICANT CHANGE UP (ref 80–100)
NRBC # BLD: 0 /100 WBCS — SIGNIFICANT CHANGE UP (ref 0–0)
PLATELET # BLD AUTO: 388 K/UL — SIGNIFICANT CHANGE UP (ref 150–400)
POTASSIUM SERPL-MCNC: 4.2 MMOL/L — SIGNIFICANT CHANGE UP (ref 3.5–5.3)
POTASSIUM SERPL-SCNC: 4.2 MMOL/L — SIGNIFICANT CHANGE UP (ref 3.5–5.3)
PROT SERPL-MCNC: 8 G/DL — SIGNIFICANT CHANGE UP (ref 6–8.3)
PROTHROM AB SERPL-ACNC: 13.3 SEC — HIGH (ref 10–12.9)
RBC # BLD: 3.7 M/UL — LOW (ref 4.2–5.8)
RBC # FLD: 13.2 % — SIGNIFICANT CHANGE UP (ref 10.3–14.5)
SODIUM SERPL-SCNC: 137 MMOL/L — SIGNIFICANT CHANGE UP (ref 135–145)
TROPONIN I SERPL-MCNC: 0.03 NG/ML — SIGNIFICANT CHANGE UP (ref 0.01–0.04)
WBC # BLD: 11.04 K/UL — HIGH (ref 3.8–10.5)
WBC # FLD AUTO: 11.04 K/UL — HIGH (ref 3.8–10.5)

## 2019-07-30 PROCEDURE — 93010 ELECTROCARDIOGRAM REPORT: CPT

## 2019-07-30 RX ORDER — MORPHINE SULFATE 50 MG/1
2 CAPSULE, EXTENDED RELEASE ORAL ONCE
Refills: 0 | Status: DISCONTINUED | OUTPATIENT
Start: 2019-07-30 | End: 2019-07-30

## 2019-07-30 RX ORDER — SODIUM CHLORIDE 9 MG/ML
1000 INJECTION INTRAMUSCULAR; INTRAVENOUS; SUBCUTANEOUS ONCE
Refills: 0 | Status: COMPLETED | OUTPATIENT
Start: 2019-07-30 | End: 2019-07-30

## 2019-07-30 RX ORDER — ASPIRIN/CALCIUM CARB/MAGNESIUM 324 MG
325 TABLET ORAL ONCE
Refills: 0 | Status: COMPLETED | OUTPATIENT
Start: 2019-07-30 | End: 2019-07-30

## 2019-07-30 RX ADMIN — SODIUM CHLORIDE 2000 MILLILITER(S): 9 INJECTION INTRAMUSCULAR; INTRAVENOUS; SUBCUTANEOUS at 21:30

## 2019-07-30 RX ADMIN — Medication 325 MILLIGRAM(S): at 21:30

## 2019-07-30 RX ADMIN — SODIUM CHLORIDE 1000 MILLILITER(S): 9 INJECTION INTRAMUSCULAR; INTRAVENOUS; SUBCUTANEOUS at 22:54

## 2019-07-30 RX ADMIN — MORPHINE SULFATE 2 MILLIGRAM(S): 50 CAPSULE, EXTENDED RELEASE ORAL at 21:50

## 2019-07-30 RX ADMIN — SODIUM CHLORIDE 1000 MILLILITER(S): 9 INJECTION INTRAMUSCULAR; INTRAVENOUS; SUBCUTANEOUS at 22:08

## 2019-07-30 RX ADMIN — MORPHINE SULFATE 2 MILLIGRAM(S): 50 CAPSULE, EXTENDED RELEASE ORAL at 21:30

## 2019-07-30 RX ADMIN — SODIUM CHLORIDE 2000 MILLILITER(S): 9 INJECTION INTRAMUSCULAR; INTRAVENOUS; SUBCUTANEOUS at 22:08

## 2019-07-30 NOTE — ED PROVIDER NOTE - OBJECTIVE STATEMENT
48 y/o M with hx of afib, DM, perfoated ulcer, recent toe amputations discharged from rehab today with acute onset of left sided chest pain radiating down left arm x 45min prior to arrival to the ED.  Pt did not take any medications prior to arrival.

## 2019-07-30 NOTE — ED ADULT TRIAGE NOTE - CHIEF COMPLAINT QUOTE
"I have chest pain and a hard time catching my breath."  pt states chest pain and pain down left arm x 45 minutes

## 2019-07-30 NOTE — ED ADULT NURSE NOTE - NS ED NURSE DISCH DISPOSITION
Bleeding that does not stop/Swelling that continues/Fever greater than 101/Persistent Nausea and Vomiting/Inability to Tolerate Liquids or Foods/Pain not relieved by Medications/Unable to Urinate Admitted

## 2019-07-30 NOTE — ED ADULT NURSE REASSESSMENT NOTE - NS ED NURSE REASSESS COMMENT FT1
pt evaluated at bedside by Dr Bui.  IV inserted, blood work drawn and sent to lab as ordered.  pt notified 2nd cardiac enzyme to be drawn at 0200 and 3rd set drawn at 0800.  cardiologist to see pt in the morning.  pt verbalized understanding.

## 2019-07-31 ENCOUNTER — TRANSCRIPTION ENCOUNTER (OUTPATIENT)
Age: 49
End: 2019-07-31

## 2019-07-31 DIAGNOSIS — I26.99 OTHER PULMONARY EMBOLISM WITHOUT ACUTE COR PULMONALE: ICD-10-CM

## 2019-07-31 DIAGNOSIS — R07.9 CHEST PAIN, UNSPECIFIED: ICD-10-CM

## 2019-07-31 DIAGNOSIS — G47.00 INSOMNIA, UNSPECIFIED: ICD-10-CM

## 2019-07-31 DIAGNOSIS — K25.5 CHRONIC OR UNSPECIFIED GASTRIC ULCER WITH PERFORATION: ICD-10-CM

## 2019-07-31 DIAGNOSIS — E11.9 TYPE 2 DIABETES MELLITUS WITHOUT COMPLICATIONS: ICD-10-CM

## 2019-07-31 DIAGNOSIS — N17.9 ACUTE KIDNEY FAILURE, UNSPECIFIED: ICD-10-CM

## 2019-07-31 DIAGNOSIS — K25.5 CHRONIC OR UNSPECIFIED GASTRIC ULCER WITH PERFORATION: Chronic | ICD-10-CM

## 2019-07-31 DIAGNOSIS — I48.91 UNSPECIFIED ATRIAL FIBRILLATION: ICD-10-CM

## 2019-07-31 DIAGNOSIS — Z29.9 ENCOUNTER FOR PROPHYLACTIC MEASURES, UNSPECIFIED: ICD-10-CM

## 2019-07-31 DIAGNOSIS — N40.0 BENIGN PROSTATIC HYPERPLASIA WITHOUT LOWER URINARY TRACT SYMPTOMS: ICD-10-CM

## 2019-07-31 DIAGNOSIS — K59.00 CONSTIPATION, UNSPECIFIED: ICD-10-CM

## 2019-07-31 LAB
TROPONIN I SERPL-MCNC: 0.04 NG/ML — SIGNIFICANT CHANGE UP (ref 0.01–0.04)
TROPONIN I SERPL-MCNC: 0.04 NG/ML — SIGNIFICANT CHANGE UP (ref 0.01–0.04)

## 2019-07-31 PROCEDURE — 99284 EMERGENCY DEPT VISIT MOD MDM: CPT

## 2019-07-31 PROCEDURE — 93010 ELECTROCARDIOGRAM REPORT: CPT

## 2019-07-31 PROCEDURE — 99223 1ST HOSP IP/OBS HIGH 75: CPT

## 2019-07-31 RX ORDER — NITROGLYCERIN 6.5 MG
0.4 CAPSULE, EXTENDED RELEASE ORAL ONCE
Refills: 0 | Status: COMPLETED | OUTPATIENT
Start: 2019-07-31 | End: 2019-07-31

## 2019-07-31 RX ORDER — POLYETHYLENE GLYCOL 3350 17 G/17G
17 POWDER, FOR SOLUTION ORAL AT BEDTIME
Refills: 0 | Status: DISCONTINUED | OUTPATIENT
Start: 2019-07-31 | End: 2019-08-01

## 2019-07-31 RX ORDER — APIXABAN 2.5 MG/1
5 TABLET, FILM COATED ORAL EVERY 12 HOURS
Refills: 0 | Status: DISCONTINUED | OUTPATIENT
Start: 2019-07-31 | End: 2019-07-31

## 2019-07-31 RX ORDER — DEXTROSE 50 % IN WATER 50 %
12.5 SYRINGE (ML) INTRAVENOUS ONCE
Refills: 0 | Status: DISCONTINUED | OUTPATIENT
Start: 2019-07-31 | End: 2019-08-01

## 2019-07-31 RX ORDER — INSULIN GLARGINE 100 [IU]/ML
15 INJECTION, SOLUTION SUBCUTANEOUS AT BEDTIME
Refills: 0 | Status: DISCONTINUED | OUTPATIENT
Start: 2019-07-31 | End: 2019-08-01

## 2019-07-31 RX ORDER — ATORVASTATIN CALCIUM 80 MG/1
80 TABLET, FILM COATED ORAL AT BEDTIME
Refills: 0 | Status: DISCONTINUED | OUTPATIENT
Start: 2019-07-31 | End: 2019-08-01

## 2019-07-31 RX ORDER — SODIUM CHLORIDE 9 MG/ML
1000 INJECTION, SOLUTION INTRAVENOUS
Refills: 0 | Status: DISCONTINUED | OUTPATIENT
Start: 2019-07-31 | End: 2019-08-01

## 2019-07-31 RX ORDER — DEXTROSE 50 % IN WATER 50 %
15 SYRINGE (ML) INTRAVENOUS ONCE
Refills: 0 | Status: DISCONTINUED | OUTPATIENT
Start: 2019-07-31 | End: 2019-08-01

## 2019-07-31 RX ORDER — NITROGLYCERIN 6.5 MG
0.4 CAPSULE, EXTENDED RELEASE ORAL ONCE
Refills: 0 | Status: DISCONTINUED | OUTPATIENT
Start: 2019-07-31 | End: 2019-08-01

## 2019-07-31 RX ORDER — GLUCAGON INJECTION, SOLUTION 0.5 MG/.1ML
1 INJECTION, SOLUTION SUBCUTANEOUS ONCE
Refills: 0 | Status: DISCONTINUED | OUTPATIENT
Start: 2019-07-31 | End: 2019-08-01

## 2019-07-31 RX ORDER — MORPHINE SULFATE 50 MG/1
1 CAPSULE, EXTENDED RELEASE ORAL EVERY 4 HOURS
Refills: 0 | Status: DISCONTINUED | OUTPATIENT
Start: 2019-07-31 | End: 2019-08-01

## 2019-07-31 RX ORDER — INSULIN LISPRO 100/ML
VIAL (ML) SUBCUTANEOUS
Refills: 0 | Status: DISCONTINUED | OUTPATIENT
Start: 2019-07-31 | End: 2019-08-01

## 2019-07-31 RX ORDER — ACETAMINOPHEN 500 MG
650 TABLET ORAL EVERY 6 HOURS
Refills: 0 | Status: DISCONTINUED | OUTPATIENT
Start: 2019-07-31 | End: 2019-08-01

## 2019-07-31 RX ORDER — PANTOPRAZOLE SODIUM 20 MG/1
40 TABLET, DELAYED RELEASE ORAL
Refills: 0 | Status: DISCONTINUED | OUTPATIENT
Start: 2019-07-31 | End: 2019-08-01

## 2019-07-31 RX ORDER — DOCUSATE SODIUM 100 MG
100 CAPSULE ORAL THREE TIMES A DAY
Refills: 0 | Status: DISCONTINUED | OUTPATIENT
Start: 2019-07-31 | End: 2019-08-01

## 2019-07-31 RX ORDER — SODIUM CHLORIDE 9 MG/ML
1000 INJECTION INTRAMUSCULAR; INTRAVENOUS; SUBCUTANEOUS
Refills: 0 | Status: DISCONTINUED | OUTPATIENT
Start: 2019-07-31 | End: 2019-08-01

## 2019-07-31 RX ORDER — DEXTROSE 50 % IN WATER 50 %
25 SYRINGE (ML) INTRAVENOUS ONCE
Refills: 0 | Status: DISCONTINUED | OUTPATIENT
Start: 2019-07-31 | End: 2019-08-01

## 2019-07-31 RX ORDER — AMIODARONE HYDROCHLORIDE 400 MG/1
100 TABLET ORAL DAILY
Refills: 0 | Status: DISCONTINUED | OUTPATIENT
Start: 2019-07-31 | End: 2019-08-01

## 2019-07-31 RX ORDER — MORPHINE SULFATE 50 MG/1
2 CAPSULE, EXTENDED RELEASE ORAL EVERY 4 HOURS
Refills: 0 | Status: DISCONTINUED | OUTPATIENT
Start: 2019-07-31 | End: 2019-08-01

## 2019-07-31 RX ORDER — MORPHINE SULFATE 50 MG/1
4 CAPSULE, EXTENDED RELEASE ORAL ONCE
Refills: 0 | Status: DISCONTINUED | OUTPATIENT
Start: 2019-07-31 | End: 2019-07-31

## 2019-07-31 RX ORDER — SENNA PLUS 8.6 MG/1
2 TABLET ORAL AT BEDTIME
Refills: 0 | Status: DISCONTINUED | OUTPATIENT
Start: 2019-07-31 | End: 2019-08-01

## 2019-07-31 RX ORDER — MORPHINE SULFATE 50 MG/1
2 CAPSULE, EXTENDED RELEASE ORAL ONCE
Refills: 0 | Status: DISCONTINUED | OUTPATIENT
Start: 2019-07-31 | End: 2019-07-31

## 2019-07-31 RX ORDER — ASPIRIN/CALCIUM CARB/MAGNESIUM 324 MG
81 TABLET ORAL DAILY
Refills: 0 | Status: DISCONTINUED | OUTPATIENT
Start: 2019-07-31 | End: 2019-07-31

## 2019-07-31 RX ORDER — ZOLPIDEM TARTRATE 10 MG/1
5 TABLET ORAL AT BEDTIME
Refills: 0 | Status: DISCONTINUED | OUTPATIENT
Start: 2019-07-31 | End: 2019-08-01

## 2019-07-31 RX ORDER — SUCRALFATE 1 G
1 TABLET ORAL
Refills: 0 | Status: DISCONTINUED | OUTPATIENT
Start: 2019-07-31 | End: 2019-08-01

## 2019-07-31 RX ORDER — TAMSULOSIN HYDROCHLORIDE 0.4 MG/1
0.4 CAPSULE ORAL AT BEDTIME
Refills: 0 | Status: DISCONTINUED | OUTPATIENT
Start: 2019-07-31 | End: 2019-08-01

## 2019-07-31 RX ORDER — INSULIN LISPRO 100/ML
VIAL (ML) SUBCUTANEOUS AT BEDTIME
Refills: 0 | Status: DISCONTINUED | OUTPATIENT
Start: 2019-07-31 | End: 2019-08-01

## 2019-07-31 RX ADMIN — Medication 1 GRAM(S): at 13:09

## 2019-07-31 RX ADMIN — POLYETHYLENE GLYCOL 3350 17 GRAM(S): 17 POWDER, FOR SOLUTION ORAL at 21:45

## 2019-07-31 RX ADMIN — Medication 1: at 16:48

## 2019-07-31 RX ADMIN — SENNA PLUS 2 TABLET(S): 8.6 TABLET ORAL at 21:45

## 2019-07-31 RX ADMIN — Medication 0.4 MILLIGRAM(S): at 00:20

## 2019-07-31 RX ADMIN — TAMSULOSIN HYDROCHLORIDE 0.4 MILLIGRAM(S): 0.4 CAPSULE ORAL at 21:45

## 2019-07-31 RX ADMIN — SODIUM CHLORIDE 100 MILLILITER(S): 9 INJECTION INTRAMUSCULAR; INTRAVENOUS; SUBCUTANEOUS at 13:22

## 2019-07-31 RX ADMIN — Medication 100 MILLIGRAM(S): at 13:09

## 2019-07-31 RX ADMIN — ATORVASTATIN CALCIUM 80 MILLIGRAM(S): 80 TABLET, FILM COATED ORAL at 21:45

## 2019-07-31 RX ADMIN — Medication 100 MILLIGRAM(S): at 21:45

## 2019-07-31 RX ADMIN — MORPHINE SULFATE 4 MILLIGRAM(S): 50 CAPSULE, EXTENDED RELEASE ORAL at 07:19

## 2019-07-31 RX ADMIN — INSULIN GLARGINE 15 UNIT(S): 100 INJECTION, SOLUTION SUBCUTANEOUS at 21:45

## 2019-07-31 RX ADMIN — MORPHINE SULFATE 4 MILLIGRAM(S): 50 CAPSULE, EXTENDED RELEASE ORAL at 05:04

## 2019-07-31 NOTE — DISCHARGE NOTE PROVIDER - CARE PROVIDERS DIRECT ADDRESSES
,DirectAddress_Unknown,[email or web address]

## 2019-07-31 NOTE — H&P ADULT - PROBLEM SELECTOR PLAN 3
Hold home oral medications  - Will start low dose ISS  - Acolis  - F/U hemoglobin A1C  - Patient takes lantus 30 units at night, will continue with 15 units at night to account for change in diet

## 2019-07-31 NOTE — H&P ADULT - NSICDXPASTMEDICALHX_GEN_ALL_CORE_FT
PAST MEDICAL HISTORY:  Afib     BPH (benign prostatic hyperplasia)     Diabetes     Diabetes mellitus with no complication     History of non-ST elevation myocardial infarction (NSTEMI)     Hypertension     Osteomyelitis s/p debridement    Perforated gastric ulcer s/p emergent ex-lap omentopexy and plication 6/2019    Pulmonary embolism

## 2019-07-31 NOTE — CONSULT NOTE ADULT - SUBJECTIVE AND OBJECTIVE BOX
[facility]         Bolivar Carbajal, El, Brittany, Zahra, Román Jacobs        [phone] (office)    CHIEF COMPLAINT: Patient is a 49y old  Male who presents with a chief complaint of chest pain     HPI: 49 year old male with PMH of PAF on eliquis, NSTEMI, osteomyelitis s/p debridement,  HTN and DM2 presenting to the ED complaining of chest pain. Chest pain started last night, described as a sharp pain, originating on left side and spreads across chest. At one point last night it traveled up to his left jaw. Not exacerbated by activity, position. Had some SOB when he first arrived to the ED last night, now resolved. No N/V, palpitations, LE edema.    In May of this year patient admitted for perforated antral ulcer, had emergent ex-lap omentopexy and plication. Post op developed chest pain and found to have new onset Afib, elevated troponin level, diagnosed with NSTEMI. Started on amiodarone, converted to SR. Patient was to follow up outpatient for ischemic eval, but has been at rehab for past 6 weeks and has not had the opportunity to do so. In June, patient returned with chest pain, similar to what he is feeling now. Found to have RUL subsegmental PE. Treated with heparin drip and transitioned to eliquis. Came back to the ED on Sunday for abdominal pain, diagnosed with constipation.  Just discharged from [facility] rehab.      PAST MEDICAL & SURGICAL HISTORY:  BPH (benign prostatic hyperplasia)  Hypertension  Afib  Diabetes mellitus with no complication  Diabetes  H/O abdominal surgery  No significant past surgical history      SOCIAL HISTORY: No active tobacco, alcohol or illicit drug use    FAMILY HISTORY:  No pertinent family history in first degree relatives  No pertinent family history in first degree relatives      Outpatient medications:    MEDICATIONS  (STANDING):    MEDICATIONS  (PRN):      Allergies    fish (Hives)  No Known Drug Allergies    Intolerances        REVIEW OF SYSTEMS: Is negative for eye, ENT, GI, , allergic, dermatologic, musculoskeletal and neurologic, except as described above.    VITAL SIGNS:   Vital Signs Last 24 Hrs  T(C): 36.8 (31 Jul 2019 05:07), Max: 37.2 (30 Jul 2019 20:17)  T(F): 98.3 (31 Jul 2019 05:07), Max: 99 (30 Jul 2019 20:17)  HR: 58 (31 Jul 2019 06:28) (58 - 98)  BP: 114/76 (31 Jul 2019 06:28) (114/76 - 164/82)  BP(mean): --  RR: 16 (31 Jul 2019 06:28) (16 - 20)  SpO2: 99% (31 Jul 2019 06:28) (97% - 99%)    I&O's Summary      PHYSICAL EXAM:    Constitutional: NAD, awake and alert, well-developed  Eyes:  EOMI, no oral cyanosis, conjunctivae clear, anicteric.  Pulmonary: Non-labored, breath sounds are clear bilaterally, no wheezing, rales or rhonchi  Cardiovascular:  regular S1 and S2. No murmur.  No rubs, gallops or clicks  Gastrointestinal: Bowel Sounds present, soft, nontender.   Lymph: No peripheral edema.   Neurological: Alert, strength and sensitivity are grossly intact  Psych:  Mood & affect appropriate .    LABS: All Labs Reviewed:                        10.2   11.04 )-----------( 388      ( 30 Jul 2019 20:53 )             32.8                         10.3   8.45  )-----------( 334      ( 28 Jul 2019 15:06 )             32.3     30 Jul 2019 20:53    137    |  102    |  15     ----------------------------<  259    4.2     |  24     |  1.40   28 Jul 2019 15:06    141    |  102    |  14     ----------------------------<  233    4.4     |  32     |  1.10     Ca    8.9        30 Jul 2019 20:53  Ca    9.3        28 Jul 2019 15:06    TPro  8.0    /  Alb  3.3    /  TBili  0.2    /  DBili  x      /  AST  17     /  ALT  24     /  AlkPhos  178    30 Jul 2019 20:53  TPro  7.6    /  Alb  3.3    /  TBili  0.2    /  DBili  x      /  AST  17     /  ALT  23     /  AlkPhos  183    28 Jul 2019 15:06    PT/INR - ( 30 Jul 2019 20:53 )   PT: 13.3 sec;   INR: 1.16 ratio         PTT - ( 30 Jul 2019 20:53 )  PTT:35.2 sec  CARDIAC MARKERS ( 31 Jul 2019 02:07 )  .039 ng/mL / x     / x     / x     / x      CARDIAC MARKERS ( 30 Jul 2019 20:53 )  .027 ng/mL / x     / x     / x     / 2.8 ng/mL      RADIOLOGY:    EXAM:  XR CHEST PA LAT 2V                            PROCEDURE DATE:  07/30/2019          INTERPRETATION:  Frontal and lateral chest on July 30, 2019 at 8:44 PM.   Patient has chest pain.    Heart size is within normal limits.    The lung fields and pleural surfaces are unremarkable.    Chest is similar to June 5 of this year.    IMPRESSION: No acute finding or change.          ABHISHEK ESPINO M.D., ATTENDING RADIOLOGIST  This document has been electronically signed. Jul 30 2019  8:47PM    EKG: NSR at 86 bpm

## 2019-07-31 NOTE — H&P ADULT - PROBLEM SELECTOR PLAN 10
IMPROVE VTE Individual Risk Assessment    RISK                                                                Points    [ x ] Previous VTE                                                  3  [  ] Thrombophilia                                               2  [  ] Lower limb paralysis                                      2        (unable to hold up >15 seconds)      [  ] Current Cancer                                              2         (within 6 months)  [  ] Immobilization > 24 hrs                                1  [  ] ICU/CCU stay > 24 hours                              1  [  ] Age > 60                                                      1  IMPROVE VTE Score _____3____    DVT ppx SCDs. patient on Eliquis, being held for cardiac cath tomorrow.

## 2019-07-31 NOTE — H&P ADULT - PROBLEM SELECTOR PLAN 5
- Pt diagnosed with RUL PE last admission  - Patient saturating well on room air  - Holding Eliquis for cardiac cath tomorrow

## 2019-07-31 NOTE — DISCHARGE NOTE PROVIDER - CARE PROVIDER_API CALL
Linette rothman  Phone: [phone]  Fax: (   )    -  Follow Up Time:     Ed Gallegos)  Cardiovascular Disease  [address] [place] [address]  Phone: [phone]  Fax: [phone]  Follow Up Time:

## 2019-07-31 NOTE — DISCHARGE NOTE PROVIDER - HOSPITAL COURSE
FROM ADMISSION H+P:     HPI:    49M with PMH of PAF on eliquis, RUL subsegmental PE, hx of recent NSTEMI, hx of perforated antral ulcer, osteomyelitis s/p debridement,  HTN and DM2 presenting to the ED complaining of chest pain. Chest pain started last night when climbing a flight of stairs. Pain described as sharp, burning pain, originating on left side and spreads across chest. At one point last night it traveled up to his left jaw and down his left arm. At onset, the pain was 10/10. Pain was associated with diaphoresis and SOB. Not exacerbated by activity, position. Patient had a friend take him to the [facility] ED after symptoms did not dissipate. Had some SOB when he first arrived to the ED last night, now resolved. No N/V, palpitations, LE edema.            Of note, patient recently admitted to [facility] for perforated antral ulcer with emergent ex-lap omentopexy and plication. Post op developed chest pain and found to have new onset Afib, elevated troponin level, diagnosed with NSTEMI. Started on amiodarone, converted to SR. Patient was to follow up outpatient for ischemic eval, but has been at rehab for past 6 weeks and has not had the opportunity to do so. In June, patient returned with chest pain, similar to what he is feeling now. Found to have RUL subsegmental PE. Treated with heparin drip and transitioned to eliquis. Came back to the ED on Sunday for abdominal pain, diagnosed with constipation.  Just discharged from [facility] rehab.        In the ED, patient's vitals were: T98.3F, HR 66, /66, RR 16 and SpO2 98% on room air. Significant labs include: wbc 11.04, h/h 10.2/32.8, D-dimer 478, BUN/Cr 15/1.4 (as per chart review baseline 1.1-1.3), Trops neg x3    CXR: no acute findings    EKG: Normal sinus rhythm. 60 BPM    Pt received asa 325mg x1, 2L normal saline bolus, NGx1, morphine 2mg x2, morphine 4mg x1. (31 Jul 2019 10:39)            ---    HOSPITAL COURSE:     Patient was admitted to [facility] for unstable angina. Cardiology saw the patient in the ED. They noted no clear evidence of acute ischemia, volume overload, or EKG changes. Due to recent history of NSTEMI, extensive smoking history, and presentation with atypical chest pain, it was deemed appropriate to undergo cardiac cath. The patient was admitted with the expectation of transferring to [facility] tomorrow for cardiac cath. Anticoagulation was held in preparation for tomorrow's procedure. Tylenol and morphine were used to control mild and moderate/severe pain respectively.      ---    CONSULTANTS:     Cardiology: Dr. Ed Gallegos        ---    TIME SPENT:    The total amount of time spent reviewing the hospital notes, laboratory values, imaging findings, assessing/counseling the patient, discussing with consultant physicians, social work, nursing staff took -- minutes        ---    FINAL DISCHARGE DIAGNOSIS LIST:    Please see last daily progress note for final discharge diagnoses.        ---    Primary care provider was made aware of plan for discharge:      [  ] NO     [  ] YES FROM ADMISSION H+P:     HPI:    49M with PMH of PAF on eliquis, RUL subsegmental PE, hx of recent NSTEMI, hx of perforated antral ulcer, osteomyelitis s/p debridement,  HTN and DM2 presenting to the ED complaining of chest pain. Chest pain started last night when climbing a flight of stairs. Pain described as sharp, burning pain, originating on left side and spreads across chest. At one point last night it traveled up to his left jaw and down his left arm. At onset, the pain was 10/10. Pain was associated with diaphoresis and SOB. Not exacerbated by activity, position. Patient had a friend take him to the [facility] ED after symptoms did not dissipate. Had some SOB when he first arrived to the ED last night, now resolved. No N/V, palpitations, LE edema.            Of note, patient recently admitted to [facility] for perforated antral ulcer with emergent ex-lap omentopexy and plication. Post op developed chest pain and found to have new onset Afib, elevated troponin level, diagnosed with NSTEMI. Started on amiodarone, converted to SR. Patient was to follow up outpatient for ischemic eval, but has been at rehab for past 6 weeks and has not had the opportunity to do so. In June, patient returned with chest pain, similar to what he is feeling now. Found to have RUL subsegmental PE. Treated with heparin drip and transitioned to eliquis. Came back to the ED on Sunday for abdominal pain, diagnosed with constipation.  Just discharged from [facility] rehab.        In the ED, patient's vitals were: T98.3F, HR 66, /66, RR 16 and SpO2 98% on room air. Significant labs include: wbc 11.04, h/h 10.2/32.8, D-dimer 478, BUN/Cr 15/1.4 (as per chart review baseline 1.1-1.3), Trops neg x3    CXR: no acute findings    EKG: Normal sinus rhythm. 60 BPM    Pt received asa 325mg x1, 2L normal saline bolus, NGx1, morphine 2mg x2, morphine 4mg x1. (31 Jul 2019 10:39)            ---    HOSPITAL COURSE:     Patient was admitted to [facility] for unstable angina. Cardiology saw the patient in the ED. They noted no clear evidence of acute ischemia, volume overload, or EKG changes. Due to recent history of NSTEMI, extensive smoking history, and presentation with atypical chest pain, it was deemed appropriate to undergo cardiac cath. The patient was admitted with the expectation of transferring to [facility] tomorrow for cardiac cath. Anticoagulation was held in preparation for tomorrow's procedure. Tylenol and morphine were used to control mild and moderate/severe pain respectively.  There were no acute events overnight, and patient was transferred as scheduled.     ---    CONSULTANTS:     Cardiology: Dr. Ed Gallegos FROM ADMISSION H+P:     HPI:    49M with PMH of PAF on eliquis, RUL subsegmental PE, hx of recent NSTEMI, hx of perforated antral ulcer, osteomyelitis s/p debridement,  HTN and DM2 presenting to the ED complaining of chest pain. Chest pain started last night when climbing a flight of stairs. Pain described as sharp, burning pain, originating on left side and spreads across chest. At one point last night it traveled up to his left jaw and down his left arm. At onset, the pain was 10/10. Pain was associated with diaphoresis and SOB. Not exacerbated by activity, position. Patient had a friend take him to the [place] ED after symptoms did not dissipate. Had some SOB when he first arrived to the ED last night, now resolved. No N/V, palpitations, LE edema.            Of note, patient recently admitted to [place] for perforated antral ulcer with emergent ex-lap omentopexy and plication. Post op developed chest pain and found to have new onset Afib, elevated troponin level, diagnosed with NSTEMI. Started on amiodarone, converted to SR. Patient was to follow up outpatient for ischemic eval, but has been at rehab for past 6 weeks and has not had the opportunity to do so. In June, patient returned with chest pain, similar to what he is feeling now. Found to have RUL subsegmental PE. Treated with heparin drip and transitioned to eliquis. Came back to the ED on Sunday for abdominal pain, diagnosed with constipation.  Just discharged from [facility] rehab.        In the ED, patient's vitals were: T98.3F, HR 66, /66, RR 16 and SpO2 98% on room air. Significant labs include: wbc 11.04, h/h 10.2/32.8, D-dimer 478, BUN/Cr 15/1.4 (as per chart review baseline 1.1-1.3), Trops neg x3    CXR: no acute findings    EKG: Normal sinus rhythm. 60 BPM    Pt received asa 325mg x1, 2L normal saline bolus, NGx1, morphine 2mg x2, morphine 4mg x1. (31 Jul 2019 10:39)            ---    HOSPITAL COURSE:     Patient was admitted to [facility] for unstable angina. Cardiology saw the patient in the ED. They noted no clear evidence of acute ischemia, volume overload, or EKG changes. Due to recent history of NSTEMI, extensive smoking history, and presentation with atypical chest pain, it was deemed appropriate to undergo cardiac cath. The patient was admitted with the expectation of transferring to [facility] tomorrow for cardiac cath. Anticoagulation was held in preparation for tomorrow's procedure. Tylenol and morphine were used to control mild and moderate/severe pain respectively.  There were no acute events overnight, and patient was transferred as scheduled.     Patient was seen and examined on the day of discharge and is medically optimized for discharge, with close outpatient follow up.        Vital Signs Last 24 Hrs    T(C): 36.6 (01 Aug 2019 07:47), Max: 36.9 (31 Jul 2019 11:21)    T(F): 97.9 (01 Aug 2019 07:47), Max: 98.4 (31 Jul 2019 11:21)    HR: 55 (01 Aug 2019 07:47) (54 - 62)    BP: 119/74 (01 Aug 2019 07:47) (109/69 - 154/81)    BP(mean): --    RR: 18 (01 Aug 2019 07:47) (16 - 19)    SpO2: 93% (01 Aug 2019 07:47) (93% - 97%)        Physical Exam:    	General: Obese, NAD, calm, cooperative    	HEENT: NCAT, EOMI bl, moist mucous membranes     	Neck: Supple, nontender, no mass    	Neurology: A&Ox3, nonfocal, sensation intact    	Respiratory: CTA B/L, No W/R/R    	CV: RRR, +S1/S2, no murmurs, rubs or gallops    	Abdominal: Soft, Tender to palpation    	Extremities: No C/C/E, + peripheral pulses    	MSK: Normal ROM, no joint erythema or warmth, no joint swelling     Skin: warm, dry        ---    CONSULTANTS:     Cardiology: Dr. Ed Gallegos

## 2019-07-31 NOTE — H&P ADULT - PROBLEM SELECTOR PLAN 2
As per chart review, Cr baseline is 1.1-1.3, now 1.4  - Avoid nephrotoxic medications, will hold furosemide in the setting of OBI  - Continue IV fluids  - Check AM BMP

## 2019-07-31 NOTE — CONSULT NOTE ADULT - ASSESSMENT
49 year old male with PMH of PAF on eliquis, NSTEMI, PE, osteomyelitis s/p debridement,  HTN and DM2 now with chest pain      - No clear evidence of acute ischemia, trops negative x 2.   - No evidence of volume overload  - No acute changes on EKG compared to previous. Patient is in NSR.   - Previous ECHO in 5/19 shows normal LV function with EF: 65%, no significant wall motion abnormalities  - Patient on Eliquis for RUL PE. D-dimer elevated at >400. Creatinine elevated to 1.4    - Follow up VQ scan  - BP well controlled, continue home BP meds, monitor routine hemodynamics  - monitor and replete lytes, keep K>4, Mg>2  - Other cardiovascular workup will depend on clinical course.  - All other workup per primary team  - Will follow 49 year old male with PMH of PAF on eliquis, NSTEMI, PE, osteomyelitis s/p debridement,  HTN and DM2 now with chest pain      - No clear evidence of acute ischemia, trops negative x 3.   - No evidence of volume overload  - No acute changes on EKG compared to previous. Patient is in NSR.   - Previous ECHO in 5/19 shows normal LV function with EF: 65%, no significant wall motion abnormalities  - Patient on Eliquis for RUL PE. D-dimer elevated at >400.   - Creatinine elevated to 1.4    - Considering recent NSTEMI diagnosis, extensive smoking history and presentation with atypical chest pain, it is reasonable to send patient for cath. However patient has been on eliquis for PE. Will hold all AC and transfer to [facility] for cath tomorrow  - hydrate for OBI   - BP well controlled, continue home BP meds, monitor routine hemodynamics  - monitor and replete lytes, keep K>4, Mg>2  - Other cardiovascular workup will depend on clinical course.  - All other workup per primary team  - Will follow

## 2019-07-31 NOTE — H&P ADULT - NSHPREVIEWOFSYSTEMS_GEN_ALL_CORE
Constitutional: denies fever, chills, diaphoresis   HEENT: denies blurry vision, difficulty hearing  Respiratory: denies SOB, cough, wheezing  Cardiovascular: +chest pain (8/10), palpitations, edema  Gastrointestinal: denies nausea, vomiting, +constipation (last bowel movement Sunday when in Ed), +abdominal pain  Genitourinary: +Difficulty urinating, denies dysuria, frequency, urgency, hematuria   Skin/Breast: denies rash, itching  Musculoskeletal: denies myalgias, joint swelling, muscle weakness  Neurologic: + slight headache. Denies weakness, dizziness. +numbness/tingling (claims neuropathy - numbness/tingling all over)  ROS negative except as noted above

## 2019-07-31 NOTE — H&P ADULT - PROBLEM SELECTOR PLAN 1
Admit to tele for unstable angina  - Plan to transfer to [facility] tomorrow for cardiac cath  - EKG no change from prior, NSR, Trops negative x3  - Echo 5/19 showed normal LV with EF 65, no significant wall abnormalities  - Continue Apirin 81mg, atovastatin 80mg, nitroglycerin 0.4 PRN  - Will hold ACE/ARB in the setting of OBI  - Continue IV fluids to improve OBI  - Pain management with tylenol and morphine PRN  - Cardio consult Dr. Gallegos Admit to tele for unstable angina  - Plan to transfer to [facility] tomorrow for cardiac cath  - EKG no change from prior, NSR, Trops negative x3  - Echo 5/19 showed normal LV with EF 65, no significant wall abnormalities  - Continue atorvastatin 80mg, nitroglycerin 0.4 PRN  - Will hold ACE/ARB in the setting of OBI  - Continue IV fluids to improve OBI  - Pain management with tylenol and morphine PRN  - Cardio consult Dr. Gallegos

## 2019-07-31 NOTE — H&P ADULT - NSHPPHYSICALEXAM_GEN_ALL_CORE
Physical Exam:  General: Obese, NAD, calm, cooperative  HEENT: NCAT, PERRLA, EOMI bl, moist mucous membranes   Neck: Supple, nontender, no mass  Neurology: A&Ox3, nonfocal, CN II-XII grossly intact, sensation intact, no gait abnormalities   Respiratory: CTA B/L, No W/R/R  CV: RRR, +S1/S2, no murmurs, rubs or gallops  Abdominal: Soft, Tender to palpation  Extremities: No C/C/E, + peripheral pulses  MSK: Normal ROM, no joint erythema or warmth, no joint swelling   Skin: warm, dry, normal color, no rash or abnormal lesions

## 2019-07-31 NOTE — H&P ADULT - NSHPSOCIALHISTORY_GEN_ALL_CORE
patient denies illicit drug use, denies alcohol use, quite cigarette use on may 10th 2019, smoked 1 pack cigarettes for 30 years prior. patient denies illicit drug use, social drinker, quite cigarette use on may 10th 2019, smoked 1.5-2 pack cigarettes for 30 years prior.

## 2019-07-31 NOTE — H&P ADULT - PROBLEM SELECTOR PLAN 4
- HR controlled  - Continue amiodarone 100mg QD with hold parameters  - Hold Eliquis in preparation for cardiac cath tomorrow

## 2019-07-31 NOTE — DISCHARGE NOTE PROVIDER - NSDCCPCAREPLAN_GEN_ALL_CORE_FT
PRINCIPAL DISCHARGE DIAGNOSIS  Diagnosis: Unstable angina  Assessment and Plan of Treatment: PRINCIPAL DISCHARGE DIAGNOSIS  Diagnosis: Unstable angina  Assessment and Plan of Treatment: - Transfer to [facility] for cardiac cath.  - Continue atorvastatin 80mg daily and nitroglycerin 0.4 as needed.      SECONDARY DISCHARGE DIAGNOSES  Diagnosis: Pulmonary embolism  Assessment and Plan of Treatment: Hold eliquis for cardiac cath.    Diagnosis: OBI (acute kidney injury)  Assessment and Plan of Treatment: - Kidney function improved.  - Continue furosemide as per cariology.

## 2019-07-31 NOTE — H&P ADULT - ASSESSMENT
49M with PMH of PAF on eliquis, RUL subsegmental PE, hx of recent NSTEMI, hx of perforated antral ulcer, osteomyelitis s/p debridement,  HTN and DM2 presenting with chest pain. Admitted to telemetry for unstable angina for transfer to [facility] for cardiac cath on 8/1.

## 2019-07-31 NOTE — DISCHARGE NOTE PROVIDER - PROVIDER TOKENS
FREE:[LAST:[rothman],FIRST:[Linette],PHONE:[phone]],FAX:[(   )    -]],PROVIDER:[[id number]]]

## 2019-07-31 NOTE — H&P ADULT - NSICDXFAMILYHX_GEN_ALL_CORE_FT
FAMILY HISTORY:  No pertinent family history in first degree relatives  No pertinent family history in first degree relatives FAMILY HISTORY:  FH: coronary artery disease, Father  FH: pulmonary embolism, Mother  FH: stroke, Father

## 2019-07-31 NOTE — ED ADULT NURSE REASSESSMENT NOTE - NS ED NURSE REASSESS COMMENT FT1
pt aox4, " sharp chest pain since last night"  no s/v, no sob, lungs clear, abd soft , + sounds, jacques cath on place draining small amounts clear urine, CM SR, IVL patent

## 2019-08-01 ENCOUNTER — TRANSCRIPTION ENCOUNTER (OUTPATIENT)
Age: 49
End: 2019-08-01

## 2019-08-01 ENCOUNTER — INPATIENT (INPATIENT)
Facility: HOSPITAL | Age: 49
LOS: 7 days | Discharge: HOME | DRG: 249 | End: 2019-08-09
Attending: INTERNAL MEDICINE | Admitting: INTERNAL MEDICINE
Payer: COMMERCIAL

## 2019-08-01 VITALS
HEART RATE: 51 BPM | OXYGEN SATURATION: 98 % | DIASTOLIC BLOOD PRESSURE: 64 MMHG | SYSTOLIC BLOOD PRESSURE: 143 MMHG | WEIGHT: 195.11 LBS | HEIGHT: 74 IN | RESPIRATION RATE: 18 BRPM

## 2019-08-01 VITALS
OXYGEN SATURATION: 96 % | TEMPERATURE: 98 F | RESPIRATION RATE: 18 BRPM | DIASTOLIC BLOOD PRESSURE: 74 MMHG | SYSTOLIC BLOOD PRESSURE: 145 MMHG | HEART RATE: 52 BPM

## 2019-08-01 DIAGNOSIS — S98.912A COMPLETE TRAUMATIC AMPUTATION OF LEFT FOOT, LEVEL UNSPECIFIED, INITIAL ENCOUNTER: Chronic | ICD-10-CM

## 2019-08-01 DIAGNOSIS — R07.9 CHEST PAIN, UNSPECIFIED: ICD-10-CM

## 2019-08-01 DIAGNOSIS — K25.5 CHRONIC OR UNSPECIFIED GASTRIC ULCER WITH PERFORATION: Chronic | ICD-10-CM

## 2019-08-01 DIAGNOSIS — Z98.890 OTHER SPECIFIED POSTPROCEDURAL STATES: Chronic | ICD-10-CM

## 2019-08-01 PROBLEM — I26.99 OTHER PULMONARY EMBOLISM WITHOUT ACUTE COR PULMONALE: Chronic | Status: ACTIVE | Noted: 2019-07-31

## 2019-08-01 PROBLEM — I25.2 OLD MYOCARDIAL INFARCTION: Chronic | Status: ACTIVE | Noted: 2019-07-31

## 2019-08-01 PROBLEM — M86.9 OSTEOMYELITIS, UNSPECIFIED: Chronic | Status: ACTIVE | Noted: 2019-07-31

## 2019-08-01 LAB
ANION GAP SERPL CALC-SCNC: 4 MMOL/L — LOW (ref 5–17)
BUN SERPL-MCNC: 13 MG/DL — SIGNIFICANT CHANGE UP (ref 7–23)
CALCIUM SERPL-MCNC: 9 MG/DL — SIGNIFICANT CHANGE UP (ref 8.5–10.1)
CHLORIDE SERPL-SCNC: 109 MMOL/L — HIGH (ref 96–108)
CO2 SERPL-SCNC: 30 MMOL/L — SIGNIFICANT CHANGE UP (ref 22–31)
CREAT SERPL-MCNC: 0.95 MG/DL — SIGNIFICANT CHANGE UP (ref 0.5–1.3)
GLUCOSE BLDC GLUCOMTR-MCNC: 171 MG/DL — HIGH (ref 70–99)
GLUCOSE BLDC GLUCOMTR-MCNC: 175 MG/DL — HIGH (ref 70–99)
GLUCOSE BLDC GLUCOMTR-MCNC: 252 MG/DL — HIGH (ref 70–99)
GLUCOSE SERPL-MCNC: 159 MG/DL — HIGH (ref 70–99)
HBA1C BLD-MCNC: 8.3 % — HIGH (ref 4–5.6)
HCT VFR BLD CALC: 31 % — LOW (ref 39–50)
HGB BLD-MCNC: 9.5 G/DL — LOW (ref 13–17)
MCHC RBC-ENTMCNC: 27.7 PG — SIGNIFICANT CHANGE UP (ref 27–34)
MCHC RBC-ENTMCNC: 30.6 GM/DL — LOW (ref 32–36)
MCV RBC AUTO: 90.4 FL — SIGNIFICANT CHANGE UP (ref 80–100)
NRBC # BLD: 0 /100 WBCS — SIGNIFICANT CHANGE UP (ref 0–0)
PLATELET # BLD AUTO: 324 K/UL — SIGNIFICANT CHANGE UP (ref 150–400)
POTASSIUM SERPL-MCNC: 4.2 MMOL/L — SIGNIFICANT CHANGE UP (ref 3.5–5.3)
POTASSIUM SERPL-SCNC: 4.2 MMOL/L — SIGNIFICANT CHANGE UP (ref 3.5–5.3)
RBC # BLD: 3.43 M/UL — LOW (ref 4.2–5.8)
RBC # FLD: 13.2 % — SIGNIFICANT CHANGE UP (ref 10.3–14.5)
SODIUM SERPL-SCNC: 143 MMOL/L — SIGNIFICANT CHANGE UP (ref 135–145)
WBC # BLD: 7.67 K/UL — SIGNIFICANT CHANGE UP (ref 3.8–10.5)
WBC # FLD AUTO: 7.67 K/UL — SIGNIFICANT CHANGE UP (ref 3.8–10.5)

## 2019-08-01 PROCEDURE — 93010 ELECTROCARDIOGRAM REPORT: CPT

## 2019-08-01 PROCEDURE — 99223 1ST HOSP IP/OBS HIGH 75: CPT

## 2019-08-01 PROCEDURE — 99232 SBSQ HOSP IP/OBS MODERATE 35: CPT

## 2019-08-01 PROCEDURE — 99152 MOD SED SAME PHYS/QHP 5/>YRS: CPT | Mod: GC

## 2019-08-01 PROCEDURE — 93458 L HRT ARTERY/VENTRICLE ANGIO: CPT | Mod: 26,GC

## 2019-08-01 RX ORDER — ATORVASTATIN CALCIUM 80 MG/1
80 TABLET, FILM COATED ORAL AT BEDTIME
Refills: 0 | Status: DISCONTINUED | OUTPATIENT
Start: 2019-08-01 | End: 2019-08-09

## 2019-08-01 RX ORDER — POTASSIUM CHLORIDE 20 MEQ
20 PACKET (EA) ORAL DAILY
Refills: 0 | Status: DISCONTINUED | OUTPATIENT
Start: 2019-08-01 | End: 2019-08-09

## 2019-08-01 RX ORDER — POTASSIUM CHLORIDE 20 MEQ
1 PACKET (EA) ORAL
Qty: 0 | Refills: 0 | DISCHARGE

## 2019-08-01 RX ORDER — INSULIN LISPRO 100/ML
VIAL (ML) SUBCUTANEOUS AT BEDTIME
Refills: 0 | Status: DISCONTINUED | OUTPATIENT
Start: 2019-08-01 | End: 2019-08-09

## 2019-08-01 RX ORDER — DEXTROSE 50 % IN WATER 50 %
15 SYRINGE (ML) INTRAVENOUS ONCE
Refills: 0 | Status: DISCONTINUED | OUTPATIENT
Start: 2019-08-01 | End: 2019-08-09

## 2019-08-01 RX ORDER — INSULIN LISPRO 100/ML
VIAL (ML) SUBCUTANEOUS
Refills: 0 | Status: DISCONTINUED | OUTPATIENT
Start: 2019-08-01 | End: 2019-08-09

## 2019-08-01 RX ORDER — PANTOPRAZOLE SODIUM 20 MG/1
40 TABLET, DELAYED RELEASE ORAL
Refills: 0 | Status: DISCONTINUED | OUTPATIENT
Start: 2019-08-01 | End: 2019-08-02

## 2019-08-01 RX ORDER — TAMSULOSIN HYDROCHLORIDE 0.4 MG/1
1 CAPSULE ORAL
Qty: 0 | Refills: 0 | DISCHARGE

## 2019-08-01 RX ORDER — DEXTROSE 50 % IN WATER 50 %
12.5 SYRINGE (ML) INTRAVENOUS ONCE
Refills: 0 | Status: DISCONTINUED | OUTPATIENT
Start: 2019-08-01 | End: 2019-08-09

## 2019-08-01 RX ORDER — FUROSEMIDE 40 MG
40 TABLET ORAL DAILY
Refills: 0 | Status: DISCONTINUED | OUTPATIENT
Start: 2019-08-01 | End: 2019-08-09

## 2019-08-01 RX ORDER — DOCUSATE SODIUM 100 MG
100 CAPSULE ORAL THREE TIMES A DAY
Refills: 0 | Status: DISCONTINUED | OUTPATIENT
Start: 2019-08-01 | End: 2019-08-09

## 2019-08-01 RX ORDER — POLYETHYLENE GLYCOL 3350 17 G/17G
17 POWDER, FOR SOLUTION ORAL AT BEDTIME
Refills: 0 | Status: DISCONTINUED | OUTPATIENT
Start: 2019-08-01 | End: 2019-08-02

## 2019-08-01 RX ORDER — TAMSULOSIN HYDROCHLORIDE 0.4 MG/1
0.4 CAPSULE ORAL AT BEDTIME
Refills: 0 | Status: DISCONTINUED | OUTPATIENT
Start: 2019-08-01 | End: 2019-08-09

## 2019-08-01 RX ORDER — SENNA PLUS 8.6 MG/1
2 TABLET ORAL AT BEDTIME
Refills: 0 | Status: DISCONTINUED | OUTPATIENT
Start: 2019-08-01 | End: 2019-08-09

## 2019-08-01 RX ORDER — INSULIN GLARGINE 100 [IU]/ML
24 INJECTION, SOLUTION SUBCUTANEOUS AT BEDTIME
Refills: 0 | Status: DISCONTINUED | OUTPATIENT
Start: 2019-08-01 | End: 2019-08-09

## 2019-08-01 RX ORDER — ASPIRIN/CALCIUM CARB/MAGNESIUM 324 MG
81 TABLET ORAL DAILY
Refills: 0 | Status: DISCONTINUED | OUTPATIENT
Start: 2019-08-01 | End: 2019-08-09

## 2019-08-01 RX ORDER — GLUCAGON INJECTION, SOLUTION 0.5 MG/.1ML
1 INJECTION, SOLUTION SUBCUTANEOUS ONCE
Refills: 0 | Status: DISCONTINUED | OUTPATIENT
Start: 2019-08-01 | End: 2019-08-09

## 2019-08-01 RX ORDER — DEXTROSE 50 % IN WATER 50 %
25 SYRINGE (ML) INTRAVENOUS ONCE
Refills: 0 | Status: DISCONTINUED | OUTPATIENT
Start: 2019-08-01 | End: 2019-08-09

## 2019-08-01 RX ORDER — SODIUM CHLORIDE 9 MG/ML
1000 INJECTION, SOLUTION INTRAVENOUS
Refills: 0 | Status: DISCONTINUED | OUTPATIENT
Start: 2019-08-01 | End: 2019-08-09

## 2019-08-01 RX ORDER — NITROGLYCERIN 6.5 MG
0.5 CAPSULE, EXTENDED RELEASE ORAL ONCE
Refills: 0 | Status: COMPLETED | OUTPATIENT
Start: 2019-08-01 | End: 2019-08-01

## 2019-08-01 RX ORDER — AMIODARONE HYDROCHLORIDE 400 MG/1
100 TABLET ORAL DAILY
Refills: 0 | Status: DISCONTINUED | OUTPATIENT
Start: 2019-08-01 | End: 2019-08-02

## 2019-08-01 RX ADMIN — Medication 40 MILLIGRAM(S): at 17:07

## 2019-08-01 RX ADMIN — POLYETHYLENE GLYCOL 3350 17 GRAM(S): 17 POWDER, FOR SOLUTION ORAL at 20:41

## 2019-08-01 RX ADMIN — Medication 1 GRAM(S): at 05:37

## 2019-08-01 RX ADMIN — Medication 0.5 INCH(S): at 20:39

## 2019-08-01 RX ADMIN — Medication 100 MILLIGRAM(S): at 05:37

## 2019-08-01 RX ADMIN — Medication 100 MILLIGRAM(S): at 20:40

## 2019-08-01 RX ADMIN — INSULIN GLARGINE 24 UNIT(S): 100 INJECTION, SOLUTION SUBCUTANEOUS at 22:19

## 2019-08-01 RX ADMIN — PANTOPRAZOLE SODIUM 40 MILLIGRAM(S): 20 TABLET, DELAYED RELEASE ORAL at 05:37

## 2019-08-01 RX ADMIN — Medication 1: at 17:07

## 2019-08-01 RX ADMIN — SODIUM CHLORIDE 100 MILLILITER(S): 9 INJECTION INTRAMUSCULAR; INTRAVENOUS; SUBCUTANEOUS at 00:45

## 2019-08-01 RX ADMIN — Medication 1: at 22:19

## 2019-08-01 RX ADMIN — Medication 81 MILLIGRAM(S): at 17:07

## 2019-08-01 RX ADMIN — TAMSULOSIN HYDROCHLORIDE 0.4 MILLIGRAM(S): 0.4 CAPSULE ORAL at 20:41

## 2019-08-01 RX ADMIN — Medication 20 MILLIEQUIVALENT(S): at 17:07

## 2019-08-01 RX ADMIN — ATORVASTATIN CALCIUM 80 MILLIGRAM(S): 80 TABLET, FILM COATED ORAL at 20:41

## 2019-08-01 RX ADMIN — SENNA PLUS 2 TABLET(S): 8.6 TABLET ORAL at 20:40

## 2019-08-01 NOTE — H&P CARDIOLOGY - PMH
Afib    BPH (benign prostatic hyperplasia)    Diabetes    Diabetes mellitus with no complication    History of non-ST elevation myocardial infarction (NSTEMI)    Hypertension    Osteomyelitis  s/p debridement  Perforated gastric ulcer  s/p emergent ex-lap omentopexy and plication 6/2019  Pulmonary embolism

## 2019-08-01 NOTE — H&P CARDIOLOGY - HISTORY OF PRESENT ILLNESS
49M with PMH of PAF on eliquis, RUL subsegmental PE, hx of recent NSTEMI, hx of perforated antral ulcer, osteomyelitis s/p debridement,  HTN and DM2 presenting to the ED [facility] on 7/31/19 complaining of chest pain. Chest pain started last night when climbing a flight of stairs. Pain described as sharp, burning pain, originating on left side and spreads across chest. At one point last night it traveled up to his left jaw and down his left arm. At onset, the pain was 10/10. Pain was associated with diaphoresis and SOB. Not exacerbated by activity, position. Patient had a friend take him to the [facility] ED after symptoms did not dissipate. Had some SOB when he first arrived to the ED last night, now resolved. No N/V, palpitations, LE edema.      Of note, patient recently admitted to [facility] for perforated antral ulcer with emergent ex-lap omentopexy and plication. Post op developed chest pain and found to have new onset Afib, elevated troponin level, diagnosed with NSTEMI. Started on amiodarone, converted to SR. Patient was to follow up outpatient for ischemic eval, but has been at rehab for past 6 weeks and has not had the opportunity to do so. In June, patient returned with chest pain, similar to what he is feeling now. Found to have RUL subsegmental PE. Treated with heparin drip and transitioned to eliquis. Came back to the ED on Sunday for abdominal pain, diagnosed with constipation.  Just discharged from [facility] rehab.      Pt received asa 325mg x1, 2L normal saline bolus, NGx1, morphine 2mg x2, morphine 4mg x1.       49M with PMH of PAF on eliquis, RUL subsegmental PE, hx of recent NSTEMI, hx of perforated antral ulcer, osteomyelitis s/p debridement,  HTN and DM2 presenting to the ED complaining of chest pain. Chest pain started last night when climbing a flight of stairs. Pain described as sharp, burning pain, originating on left side and spreads across chest. At one point last night it traveled up to his left jaw and down his left arm. At onset, the pain was 10/10. Pain was associated with diaphoresis and SOB. Not exacerbated by activity, position. Patient had a friend take him to the [facility] ED after symptoms did not dissipate. Had some SOB when he first arrived to the ED last night, now resolved. No N/V, palpitations, LE edema.      Of note, patient recently admitted to [facility] for perforated antral ulcer with emergent ex-lap omentopexy and plication. Post op developed chest pain and found to have new onset Afib, elevated troponin level, diagnosed with NSTEMI. Started on amiodarone, converted to SR. Patient was to follow up outpatient for ischemic eval, but has been at rehab for past 6 weeks and has not had the opportunity to do so. In June, patient returned with chest pain, similar to what he is feeling now. Found to have RUL subsegmental PE. Treated with heparin drip and transitioned to eliquis. Came back to the ED on Sunday for abdominal pain, diagnosed with constipation.  Just discharged from [facility] rehab.    In the ED, patient's vitals were: T98.3F, HR 66, /66, RR 16 and SpO2 98% on room air. Significant labs include: wbc 11.04, h/h 10.2/32.8, D-dimer 478, BUN/Cr 15/1.4 (as per chart review baseline 1.1-1.3), Trops neg x3  CXR: no acute findings  EKG: Normal sinus rhythm. 60 BPM  Pt received asa 325mg x1, 2L normal saline bolus, NGx1, morphine 2mg x2, morphine 4mg x1. (31 Jul 2019 10:39)      ---  HOSPITAL COURSE:   Patient was admitted to [facility] for unstable angina. Cardiology saw the patient in the ED. They noted no clear evidence of acute ischemia, volume overload, or EKG changes. Due to recent history of NSTEMI, extensive smoking history, and presentation with atypical chest pain, it was deemed appropriate to undergo cardiac cath. The patient was admitted with the expectation of transferring to [facility] tomorrow for cardiac cath. Anticoagulation was held in preparation for tomorrow's procedure. Tylenol and morphine were used to control mild and moderate/severe pain respectively.  There were no acute events overnight, and patient was transferred as scheduled.   Patient was seen and examined on the day of discharge and is medically optimized for discharge, with close outpatient follow up. 49M with PMH of PAF on eliquis, RUL subsegmental PE, hx of recent NSTEMI, hx of perforated antral ulcer, osteomyelitis s/p debridement,  HTN and DM2 presenting to the ED [place] on 7/31/19 complaining of chest pain. Chest pain started last night when climbing a flight of stairs. Pain described as sharp, burning pain, originating on left side and spreads across chest. At one point last night it traveled up to his left jaw and down his left arm. At onset, the pain was 10/10. Pain was associated with diaphoresis and SOB. Not exacerbated by activity, position. Patient had a friend take him to the [facility] ED after symptoms did not dissipate. Had some SOB when he first arrived to the ED last night, now resolved. No N/V, palpitations, LE edema.  Of note, patient recently admitted to [facility] for perforated antral ulcer with emergent ex-lap omentopexy and plication. Post op developed chest pain and found to have new onset Afib, elevated troponin level, diagnosed with NSTEMI. Started on amiodarone, converted to SR. Patient was to follow up outpatient for ischemic eval, but has been at rehab for past 6 weeks and has not had the opportunity to do so. In June, patient returned with chest pain, similar to what he is feeling now. Found to have RUL subsegmental PE. Treated with heparin drip and transitioned to eliquis. Came back to the ED on Sunday for abdominal pain, diagnosed with constipation.  Just discharged from [facility] rehab.    Patient was admitted to [facility] for unstable angina. Cardiology saw the patient in the ED. They noted no clear evidence of acute ischemia, volume overload, or EKG changes. Due to recent history of NSTEMI, extensive smoking history, and presentation with atypical chest pain, it was deemed appropriate to undergo cardiac cath. 49M with PMH of PAF on eliquis, RUL subsegmental PE, hx of recent NSTEMI, hx of perforated antral ulcer, osteomyelitis s/p debridement,  HTN and DM2 presenting to the ED [place] on 7/31/19 complaining of chest pain. Chest pain started last night when climbing a flight of stairs. Pain described as sharp, burning pain, originating on left side and spreads across chest. At one point last night it traveled up to his left jaw and down his left arm. At onset, the pain was 10/10. Pain was associated with diaphoresis and SOB. Not exacerbated by activity, position. Patient had a friend take him to the [place] ED after symptoms did not dissipate. Had some SOB when he first arrived to the ED last night, now resolved. No N/V, palpitations, LE edema.  Of note, patient recently admitted to [place] for perforated antral ulcer with emergent ex-lap omentopexy and plication in 5/2019. Post op developed chest pain and found to have new onset Afib, elevated troponin level, diagnosed with NSTEMI. Started on amiodarone, converted to SR. Patient was to follow up outpatient for ischemic eval, but has been at rehab for past 6 weeks and has not had the opportunity to do so. In June, patient returned with chest pain, similar to what he is feeling now. Found to have RUL subsegmental PE. Treated with heparin drip and transitioned to eliquis. Came back to the ED on Sunday for abdominal pain, diagnosed with constipation.  Just discharged from [facility] rehab.    Patient was admitted to [facility] in [place] for unstable angina. Cardiology saw the patient in the ED. They noted no clear evidence of acute ischemia, volume overload, or EKG changes. Due to recent history of NSTEMI, extensive smoking history, and presentation with atypical chest pain, it was deemed appropriate to undergo cardiac cath. 49M with PMH of PAF on eliquis, RUL subsegmental PE, hx of recent NSTEMI, hx of perforated antral ulcer, osteomyelitis s/p debridement,  HTN and DM2 presenting to the ED [place] on 7/31/19 complaining of chest pain. Chest pain started last night when climbing a flight of stairs. Pain described as sharp, burning pain, originating on left side and spreads across chest. At one point last night it traveled up to his left jaw and down his left arm. At onset, the pain was 10/10. Pain was associated with diaphoresis and SOB. Not exacerbated by activity, position. Patient had a friend take him to the [place] ED after symptoms did not dissipate. Had some SOB when he first arrived to the ED last night, now resolved. No N/V, palpitations, LE edema.  Of note, patient recently admitted to [place] for perforated antral ulcer with emergent ex-lap omentopexy and plication in 5/2019. Post op developed chest pain and found to have new onset Afib, elevated troponin level, diagnosed with NSTEMI. Started on amiodarone, converted to SR. Patient was to follow up outpatient for ischemic eval, but has been at rehab for past 6 weeks and has not had the opportunity to do so. In June, patient returned with chest pain, similar to what he is feeling now. Found to have RUL subsegmental PE. Treated with heparin drip and transitioned to eliquis. Pt was seen by GI Dr Hannah Lentz and cleared the patient for AC. Pt returned back to the ED again for abdominal pain, diagnosed with constipation.  Just discharged from [facility] rehab.    Patient was admitted to tele in [place] for unstable angina. Cardiology saw the patient in the ED. They noted no clear evidence of acute ischemia, volume overload, or EKG changes. Due to recent history of NSTEMI, extensive smoking history, and presentation with atypical chest pain, it was deemed appropriate to undergo cardiac cath. 49M with PMH of PAF on eliquis, RUL subsegmental PE, hx of recent NSTEMI, hx of perforated antral ulcer, osteomyelitis s/p debridement,  HTN and DM2 presenting to the ED [place] on 7/31/19 complaining of chest pain. Chest pain started last night when climbing a flight of stairs. Pain described as sharp, burning pain, originating on left side and spreads across chest. At one point last night it traveled up to his left jaw and down his left arm. At onset, the pain was 10/10. Pain was associated with diaphoresis and SOB. Not exacerbated by activity, position. Patient had a friend take him to the [place] ED after symptoms did not dissipate. Had some SOB when he first arrived to the ED last night, now resolved. No N/V, palpitations, LE edema.  Of note, patient recently admitted to [place] for perforated antral ulcer with emergent ex-lap omentopexy and plication in 5/2019. Post op developed chest pain and found to have new onset Afib, elevated troponin level, diagnosed with NSTEMI. Started on amiodarone, converted to SR. Patient was to follow up outpatient for ischemic eval, but has been at rehab for past 6 weeks and has not had the opportunity to do so. In June, patient returned with chest pain, similar to what he is feeling now. Found to have RUL subsegmental PE. Treated with heparin drip and transitioned to eliquis. Pt was seen by GI Dr Hannah Lentz and cleared the patient for AC. Pt returned back to the ED again 7/28/19 for abdominal pain, diagnosed with constipation. Just discharged from [facility] rehab.    Patient was admitted to [facility] in [place] for unstable angina. Cardiology saw the patient in the ED. They noted no clear evidence of acute ischemia, volume overload, or EKG changes. Due to recent history of NSTEMI, extensive smoking history, and presentation with atypical chest pain, it was deemed appropriate to undergo cardiac cath.

## 2019-08-01 NOTE — DISCHARGE NOTE NURSING/CASE MANAGEMENT/SOCIAL WORK - NSDCDPATPORTLINK_GEN_ALL_CORE
You can access the [facility] Patient Portal, offered by [facility], by registering with the following website: [email or web address]

## 2019-08-01 NOTE — PROGRESS NOTE ADULT - ASSESSMENT
49 year old male with PMH of PAF on eliquis, NSTEMI, PE, osteomyelitis s/p debridement,  HTN and DM2 now with chest pain for transfer to [facility] for ischemic eval    Angina/ Prior NSTEMI  - C/W statin  -ASA held for cath resume   - No clear evidence of acute ischemia, trops negative x 3.   - No evidence of volume overload  - No acute changes on EKG compared to previous. Patient is in NSR.   - Previous ECHO in 5/19 shows normal LV function with EF: 65%, no significant wall motion abnormalities  - Considering recent NSTEMI diagnosis, extensive smoking history and presentation with atypical chest pain, it is reasonable to send patient for cath.   However patient has been on eliquis for PE. All AC held and transfer to [facility] for cath today      P Afib/PE  - Patient on Eliquis for RUL PE & Afib    - Held for CC  -Resume s/p cath  -Rate controlled on Amio    OBI  -Creat improved  s/p hydration    HTN  - Controlled  - CW current regime  - monitor routine hemodynamics.     - monitor and replete lytes, keep K>4, Mg>2  - Other cardiovascular workup will depend on clinical course.  - All other workup per primary team  - Will follow   Ed Paula [facility]  Cardiology   Spectra #[phone] 49 year old male with PMH of PAF on eliquis, NSTEMI, PE, osteomyelitis s/p debridement,  HTN and DM2 now with chest pain for transfer to [facility] for ischemic eval    Angina/ Prior NSTEMI  - C/W statin  -ASA held for cath resume   - No clear evidence of acute ischemia, trops negative x 3.   - No evidence of volume overload  - No acute changes on EKG compared to previous. Patient is in NSR.   - Previous ECHO in 5/19 shows normal LV function with EF: 65%, no significant wall motion abnormalities  - Considering recent NSTEMI diagnosis, extensive smoking history and presentation with atypical chest pain, it is reasonable to send patient for cath.  All AC held and transfer to [facility] for cath today      P Afib/PE  - Patient on Eliquis for RUL PE & Afib    - Held for CC  -Resume s/p cath  -Rate controlled on Amio    OBI  -Creat improved  s/p hydration    HTN  - Controlled  - CW current regime  - monitor routine hemodynamics.     - monitor and replete lytes, keep K>4, Mg>2  - Other cardiovascular workup will depend on clinical course.  - All other workup per primary team  - Will follow   Ed Paula [facility]  Cardiology   Spectra #[phone]

## 2019-08-01 NOTE — H&P CARDIOLOGY - PSH
H/O abdominal surgery    Perforated gastric ulcer H/O abdominal surgery    Perforated gastric ulcer    Traumatic amputation of left foot, initial encounter

## 2019-08-01 NOTE — PROGRESS NOTE ADULT - SUBJECTIVE AND OBJECTIVE BOX
[facility] -- Bolivar Carbajal, El, Brittany, Reynaldo Sweeney Savella  Office # [phone]      Follow Up:    Cp  Subjective/Observations:   No events overnight resting comfortably in bed.  No complaints of chest pain, dyspnea, or palpitations reported. No signs of orthopnea or PND.     REVIEW OF SYSTEMS: All other review of systems is negative unless indicated above    PAST MEDICAL & SURGICAL HISTORY:  Osteomyelitis: s/p debridement  History of non-ST elevation myocardial infarction (NSTEMI)  Pulmonary embolism  Perforated gastric ulcer: s/p emergent ex-lap omentopexy and plication 6/2019  BPH (benign prostatic hyperplasia)  Hypertension  Afib  Diabetes mellitus with no complication  Diabetes  Perforated gastric ulcer  H/O abdominal surgery      MEDICATIONS  (STANDING):  amiodarone    Tablet 100 milliGRAM(s) Oral daily  atorvastatin 80 milliGRAM(s) Oral at bedtime  dextrose 5%. 1000 milliLiter(s) (50 mL/Hr) IV Continuous <Continuous>  dextrose 50% Injectable 12.5 Gram(s) IV Push once  dextrose 50% Injectable 25 Gram(s) IV Push once  dextrose 50% Injectable 25 Gram(s) IV Push once  docusate sodium 100 milliGRAM(s) Oral three times a day  insulin glargine Injectable (LANTUS) 15 Unit(s) SubCutaneous at bedtime  insulin lispro (HumaLOG) corrective regimen sliding scale   SubCutaneous three times a day before meals  insulin lispro (HumaLOG) corrective regimen sliding scale   SubCutaneous at bedtime  pantoprazole    Tablet 40 milliGRAM(s) Oral before breakfast  polyethylene glycol 3350 17 Gram(s) Oral at bedtime  senna 2 Tablet(s) Oral at bedtime  sodium chloride 0.9%. 1000 milliLiter(s) (100 mL/Hr) IV Continuous <Continuous>  sucralfate 1 Gram(s) Oral two times a day  tamsulosin 0.4 milliGRAM(s) Oral at bedtime    MEDICATIONS  (PRN):  acetaminophen   Tablet .. 650 milliGRAM(s) Oral every 6 hours PRN Mild Pain (1 - 3)  aluminum hydroxide/magnesium hydroxide/simethicone Suspension 30 milliLiter(s) Oral every 4 hours PRN Dyspepsia  dextrose 40% Gel 15 Gram(s) Oral once PRN Blood Glucose LESS THAN 70 milliGRAM(s)/deciliter  glucagon  Injectable 1 milliGRAM(s) IntraMuscular once PRN Glucose LESS THAN 70 milligrams/deciliter  morphine  - Injectable 2 milliGRAM(s) IV Push every 4 hours PRN Severe Pain (7 - 10)  morphine  - Injectable 1 milliGRAM(s) IV Push every 4 hours PRN Moderate Pain (4 - 6)  nitroglycerin     SubLingual 0.4 milliGRAM(s) SubLingual once PRN Chest Pain  saline laxative (FLEET) Rectal Enema 1 Enema Rectal daily PRN constipation  zolpidem 5 milliGRAM(s) Oral at bedtime PRN Insomnia      Allergies    fish (Hives)  No Known Drug Allergies    Intolerances        Vital Signs Last 24 Hrs  T(C): 36.6 (01 Aug 2019 07:47), Max: 36.9 (31 Jul 2019 11:21)  T(F): 97.9 (01 Aug 2019 07:47), Max: 98.4 (31 Jul 2019 11:21)  HR: 55 (01 Aug 2019 07:47) (54 - 62)  BP: 119/74 (01 Aug 2019 07:47) (109/69 - 154/81)  BP(mean): --  RR: 18 (01 Aug 2019 07:47) (16 - 19)  SpO2: 93% (01 Aug 2019 07:47) (93% - 98%)    I&O's Summary    31 Jul 2019 07:01  -  01 Aug 2019 07:00  --------------------------------------------------------  IN: 2260 mL / OUT: 3100 mL / NET: -840 mL          PHYSICAL EXAM:  TELE:   Constitutional: NAD, awake and alert, well-developed  HEENT: Moist Mucous Membranes, Anicteric  Pulmonary: Non-labored, breath sounds are clear bilaterally, No wheezing, crackles or rhonchi  Cardiovascular: Regular, S1 and S2 nl, No murmurs, rubs, gallops or clicks  Gastrointestinal: Bowel Sounds present, soft, nontender.   Lymph: No lymphadenopathy. No peripheral edema.  Skin: No visible rashes or ulcers.  Psych:  Mood & affect appropriate    LABS: All Labs Reviewed:                        9.5    7.67  )-----------( 324      ( 01 Aug 2019 06:09 )             31.0                         10.2   11.04 )-----------( 388      ( 30 Jul 2019 20:53 )             32.8     01 Aug 2019 06:09    143    |  109    |  13     ----------------------------<  159    4.2     |  30     |  0.95   30 Jul 2019 20:53    137    |  102    |  15     ----------------------------<  259    4.2     |  24     |  1.40     Ca    9.0        01 Aug 2019 06:09  Ca    8.9        30 Jul 2019 20:53    TPro  8.0    /  Alb  3.3    /  TBili  0.2    /  DBili  x      /  AST  17     /  ALT  24     /  AlkPhos  178    30 Jul 2019 20:53    PT/INR - ( 30 Jul 2019 20:53 )   PT: 13.3 sec;   INR: 1.16 ratio         PTT - ( 30 Jul 2019 20:53 )  PTT:35.2 sec  CARDIAC MARKERS ( 31 Jul 2019 08:02 )  .038 ng/mL / x     / x     / x     / x      CARDIAC MARKERS ( 31 Jul 2019 02:07 )  .039 ng/mL / x     / x     / x     / x      CARDIAC MARKERS ( 30 Jul 2019 20:53 )  .027 ng/mL / x     / x     / x     / 2.8 ng/mL    < from: TTE Echo Doppler w/o Cont (05.16.19 @ 09:45) >  EXAM:  ECHO TTE WO CON COMP W DOPPLR         PROCEDURE DATE:  05/16/2019        INTERPRETATION:  INDICATION: Elevated troponin    Blood Pressure 137/62    Height 187.96 cm     Weight 81.6 kg       BSA   2.1 sq m    Dimensions:    LA 4.1       Normal Values: 2.0 - 4.0 cm    Ao 3.6        Normal Values: 2.0 - 3.8 cm  SEPTUM 1.3       Normal Values: 0.6 - 1.2 cm  PWT 1.3       Normal Values: 0.6 - 1.1 cm  LVIDd 6.0         Normal Values: 3.0 - 5.6 cm  LVIDs 3.1         Normal Values: 1.8 - 4.0 cm      OBSERVATIONS:    Mitral Valve: Thickened leaflets, moderate MR.  Aortic Valve/Aorta: Sclerotic trileaflet aortic valve with normal   opening. Trace AI  Tricuspid Valve: normal with mild TR.  Pulmonic Valve: normal  Left Atrium: Mildly dilated  Right Atrium: normal  Left Ventricle: normal LV size and systolic function, estimated LVEF of   65%. No evidence of segmental dysfunction. Basal septal hypertrophy is   present  Right Ventricle: normal size and systolic function.  Pericardium/Pleura: normal, no significant pericardial effusion.    Conclusion:     normal LV size and systolic function, estimated LVEF of 65%. No evidence   of segmental dysfunction  Normal RV size and systolic function.   Mild left atrial enlargement  Sclerotic trileaflet aortic valve with normal opening. Trace AI  Mitral valve with thickened leaflets and moderate MR  Mild TR  No significant pericardial effusion.                  MIROSLAVA CASTAÑEDA   This document has been electronically signed. May 17 2019  7:45AM    < end of copied text >     RADIOLOGY:    EXAM:  XR CHEST PA LAT 2V                            PROCEDURE DATE:  07/30/2019          INTERPRETATION:  Frontal and lateral chest on July 30, 2019 at 8:44 PM.   Patient has chest pain.    Heart size is within normal limits.    The lung fields and pleural surfaces are unremarkable.    Chest is similar to June 5 of this year.    IMPRESSION: No acute finding or change.          ABHISHEK ESPINO M.D., ATTENDING RADIOLOGIST  This document has been electronically signed. Jul 30 2019  8:47PM    EKG: NSR at 86 bpm     Ed [facility]   Cardiology

## 2019-08-02 ENCOUNTER — TRANSCRIPTION ENCOUNTER (OUTPATIENT)
Age: 49
End: 2019-08-02

## 2019-08-02 DIAGNOSIS — K59.00 CONSTIPATION, UNSPECIFIED: ICD-10-CM

## 2019-08-02 DIAGNOSIS — D64.9 ANEMIA, UNSPECIFIED: ICD-10-CM

## 2019-08-02 DIAGNOSIS — I25.10 ATHEROSCLEROTIC HEART DISEASE OF NATIVE CORONARY ARTERY WITHOUT ANGINA PECTORIS: ICD-10-CM

## 2019-08-02 DIAGNOSIS — Z71.89 OTHER SPECIFIED COUNSELING: ICD-10-CM

## 2019-08-02 LAB
ANION GAP SERPL CALC-SCNC: 12 MMOL/L — SIGNIFICANT CHANGE UP (ref 5–17)
BUN SERPL-MCNC: 14 MG/DL — SIGNIFICANT CHANGE UP (ref 7–23)
CALCIUM SERPL-MCNC: 9.6 MG/DL — SIGNIFICANT CHANGE UP (ref 8.4–10.5)
CHLORIDE SERPL-SCNC: 102 MMOL/L — SIGNIFICANT CHANGE UP (ref 96–108)
CO2 SERPL-SCNC: 24 MMOL/L — SIGNIFICANT CHANGE UP (ref 22–31)
CREAT SERPL-MCNC: 1.14 MG/DL — SIGNIFICANT CHANGE UP (ref 0.5–1.3)
GLUCOSE BLDC GLUCOMTR-MCNC: 129 MG/DL — HIGH (ref 70–99)
GLUCOSE BLDC GLUCOMTR-MCNC: 158 MG/DL — HIGH (ref 70–99)
GLUCOSE BLDC GLUCOMTR-MCNC: 202 MG/DL — HIGH (ref 70–99)
GLUCOSE BLDC GLUCOMTR-MCNC: 210 MG/DL — HIGH (ref 70–99)
GLUCOSE SERPL-MCNC: 166 MG/DL — HIGH (ref 70–99)
HCT VFR BLD CALC: 32.2 % — LOW (ref 39–50)
HGB BLD-MCNC: 10.1 G/DL — LOW (ref 13–17)
MCHC RBC-ENTMCNC: 28.5 PG — SIGNIFICANT CHANGE UP (ref 27–34)
MCHC RBC-ENTMCNC: 31.4 GM/DL — LOW (ref 32–36)
MCV RBC AUTO: 90.8 FL — SIGNIFICANT CHANGE UP (ref 80–100)
PLATELET # BLD AUTO: 350 K/UL — SIGNIFICANT CHANGE UP (ref 150–400)
POTASSIUM SERPL-MCNC: 3.9 MMOL/L — SIGNIFICANT CHANGE UP (ref 3.5–5.3)
POTASSIUM SERPL-SCNC: 3.9 MMOL/L — SIGNIFICANT CHANGE UP (ref 3.5–5.3)
RBC # BLD: 3.55 M/UL — LOW (ref 4.2–5.8)
RBC # FLD: 12.9 % — SIGNIFICANT CHANGE UP (ref 10.3–14.5)
SODIUM SERPL-SCNC: 138 MMOL/L — SIGNIFICANT CHANGE UP (ref 135–145)
WBC # BLD: 8.8 K/UL — SIGNIFICANT CHANGE UP (ref 3.8–10.5)
WBC # FLD AUTO: 8.8 K/UL — SIGNIFICANT CHANGE UP (ref 3.8–10.5)

## 2019-08-02 PROCEDURE — 99225: CPT

## 2019-08-02 PROCEDURE — 93010 ELECTROCARDIOGRAM REPORT: CPT

## 2019-08-02 PROCEDURE — 99222 1ST HOSP IP/OBS MODERATE 55: CPT

## 2019-08-02 PROCEDURE — 92941 PRQ TRLML REVSC TOT OCCL AMI: CPT | Mod: RC,GC

## 2019-08-02 PROCEDURE — 99152 MOD SED SAME PHYS/QHP 5/>YRS: CPT | Mod: GC

## 2019-08-02 RX ORDER — MULTIVIT WITH MIN/MFOLATE/K2 340-15/3 G
1 POWDER (GRAM) ORAL ONCE
Refills: 0 | Status: DISCONTINUED | OUTPATIENT
Start: 2019-08-02 | End: 2019-08-05

## 2019-08-02 RX ORDER — AMIODARONE HYDROCHLORIDE 400 MG/1
100 TABLET ORAL
Refills: 0 | Status: DISCONTINUED | OUTPATIENT
Start: 2019-08-02 | End: 2019-08-09

## 2019-08-02 RX ORDER — CLOPIDOGREL BISULFATE 75 MG/1
600 TABLET, FILM COATED ORAL ONCE
Refills: 0 | Status: COMPLETED | OUTPATIENT
Start: 2019-08-02 | End: 2019-08-02

## 2019-08-02 RX ORDER — PANTOPRAZOLE SODIUM 20 MG/1
40 TABLET, DELAYED RELEASE ORAL
Refills: 0 | Status: DISCONTINUED | OUTPATIENT
Start: 2019-08-02 | End: 2019-08-09

## 2019-08-02 RX ORDER — ASPIRIN/CALCIUM CARB/MAGNESIUM 324 MG
325 TABLET ORAL ONCE
Refills: 0 | Status: COMPLETED | OUTPATIENT
Start: 2019-08-02 | End: 2019-08-02

## 2019-08-02 RX ORDER — CLOPIDOGREL BISULFATE 75 MG/1
75 TABLET, FILM COATED ORAL DAILY
Refills: 0 | Status: DISCONTINUED | OUTPATIENT
Start: 2019-08-02 | End: 2019-08-09

## 2019-08-02 RX ORDER — SUCRALFATE 1 G
1 TABLET ORAL
Refills: 0 | Status: DISCONTINUED | OUTPATIENT
Start: 2019-08-02 | End: 2019-08-09

## 2019-08-02 RX ORDER — POLYETHYLENE GLYCOL 3350 17 G/17G
17 POWDER, FOR SOLUTION ORAL
Refills: 0 | Status: DISCONTINUED | OUTPATIENT
Start: 2019-08-02 | End: 2019-08-09

## 2019-08-02 RX ORDER — APIXABAN 2.5 MG/1
5 TABLET, FILM COATED ORAL
Refills: 0 | Status: DISCONTINUED | OUTPATIENT
Start: 2019-08-02 | End: 2019-08-09

## 2019-08-02 RX ORDER — ACETAMINOPHEN 500 MG
650 TABLET ORAL ONCE
Refills: 0 | Status: COMPLETED | OUTPATIENT
Start: 2019-08-02 | End: 2019-08-02

## 2019-08-02 RX ADMIN — TAMSULOSIN HYDROCHLORIDE 0.4 MILLIGRAM(S): 0.4 CAPSULE ORAL at 21:39

## 2019-08-02 RX ADMIN — Medication 650 MILLIGRAM(S): at 00:18

## 2019-08-02 RX ADMIN — PANTOPRAZOLE SODIUM 40 MILLIGRAM(S): 20 TABLET, DELAYED RELEASE ORAL at 06:14

## 2019-08-02 RX ADMIN — APIXABAN 5 MILLIGRAM(S): 2.5 TABLET, FILM COATED ORAL at 20:35

## 2019-08-02 RX ADMIN — ATORVASTATIN CALCIUM 80 MILLIGRAM(S): 80 TABLET, FILM COATED ORAL at 21:39

## 2019-08-02 RX ADMIN — Medication 650 MILLIGRAM(S): at 00:45

## 2019-08-02 RX ADMIN — SENNA PLUS 2 TABLET(S): 8.6 TABLET ORAL at 21:39

## 2019-08-02 RX ADMIN — Medication 325 MILLIGRAM(S): at 08:23

## 2019-08-02 RX ADMIN — Medication 1 GRAM(S): at 20:36

## 2019-08-02 RX ADMIN — Medication 100 MILLIGRAM(S): at 06:14

## 2019-08-02 RX ADMIN — INSULIN GLARGINE 24 UNIT(S): 100 INJECTION, SOLUTION SUBCUTANEOUS at 21:39

## 2019-08-02 RX ADMIN — Medication 40 MILLIGRAM(S): at 06:14

## 2019-08-02 RX ADMIN — AMIODARONE HYDROCHLORIDE 100 MILLIGRAM(S): 400 TABLET ORAL at 12:03

## 2019-08-02 RX ADMIN — CLOPIDOGREL BISULFATE 600 MILLIGRAM(S): 75 TABLET, FILM COATED ORAL at 08:23

## 2019-08-02 RX ADMIN — PANTOPRAZOLE SODIUM 40 MILLIGRAM(S): 20 TABLET, DELAYED RELEASE ORAL at 18:47

## 2019-08-02 RX ADMIN — Medication 81 MILLIGRAM(S): at 06:14

## 2019-08-02 RX ADMIN — Medication 20 MILLIEQUIVALENT(S): at 12:03

## 2019-08-02 RX ADMIN — Medication 100 MILLIGRAM(S): at 21:41

## 2019-08-02 RX ADMIN — Medication 2: at 12:03

## 2019-08-02 NOTE — CONSULT NOTE ADULT - ASSESSMENT
Mr. Kenney is a 49 M with PAF on eliquis, RUL subsegmental PE, hx of recent NSTEMI, hx of perforated antral ulcer, osteomyelitis s/p debridement,  HTN and DM2 presenting to [place] on 7/31/19 complaining of chest pain.   He is now s/p cath with mild LAD disease, 90% RCA and 100% RPL.    - GI evaluation prior to committing him to triple therapy  - Eliquis on hold in preparation for cardiac cath. He will eventually need to be on ASA, Plavix and Eliquis.  - Sinus bradycardia on telemetry, though no additional AF. Continue with Amiodarone with hold parameters  - BB has been held in the setting of bradycardia  - continue with statin  - Euvolemic on exam. Continue with Po lasix.  - echocardiogram  - PT evaluation  - Watch creatinine and electrolytes. Keep K>4, Mg>2  - Will follow with you.

## 2019-08-02 NOTE — CONSULT NOTE ADULT - PROBLEM SELECTOR RECOMMENDATION 2
- continue senna 2 tabs at bedtime, colace 100 mg TID  - start Miralax 17 grams BID  - 1 bottle of magnesium  citrate ordered, monitor for bms

## 2019-08-02 NOTE — CONSULT NOTE ADULT - ASSESSMENT
49 M with PAF on eliquis, RUL subsegmental PE, hx of recent NSTEMI, hx of perforated antral ulcer, he is now s/p cath with mild LAD disease, 90% RCA and 100% RPL. GI consulted.

## 2019-08-02 NOTE — PROGRESS NOTE ADULT - SUBJECTIVE AND OBJECTIVE BOX
Patient is a 49y old  Male who presents with a chief complaint of CAD, now s/p diagnostic  LHC revealing, RCA 90%; % via RRA access          Allergies    fish (Hives)  No Known Drug Allergies    Intolerances        Medications:  amiodarone    Tablet 100 milliGRAM(s) Oral daily  aspirin enteric coated 81 milliGRAM(s) Oral daily  atorvastatin 80 milliGRAM(s) Oral at bedtime  dextrose 40% Gel 15 Gram(s) Oral once PRN  dextrose 5%. 1000 milliLiter(s) IV Continuous <Continuous>  dextrose 50% Injectable 12.5 Gram(s) IV Push once  dextrose 50% Injectable 25 Gram(s) IV Push once  dextrose 50% Injectable 25 Gram(s) IV Push once  docusate sodium 100 milliGRAM(s) Oral three times a day  furosemide    Tablet 40 milliGRAM(s) Oral daily  glucagon  Injectable 1 milliGRAM(s) IntraMuscular once PRN  insulin glargine Injectable (LANTUS) 24 Unit(s) SubCutaneous at bedtime  insulin lispro (HumaLOG) corrective regimen sliding scale   SubCutaneous three times a day before meals  insulin lispro (HumaLOG) corrective regimen sliding scale   SubCutaneous at bedtime  pantoprazole    Tablet 40 milliGRAM(s) Oral before breakfast  polyethylene glycol 3350 17 Gram(s) Oral at bedtime  potassium chloride    Tablet ER 20 milliEquivalent(s) Oral daily  senna 2 Tablet(s) Oral at bedtime  tamsulosin 0.4 milliGRAM(s) Oral at bedtime      Vitals:  T(C): 36.9 (19 @ 21:37), Max: 36.9 (19 @ 12:24)  HR: 57 (19 @ 21:37) (51 - 95)  BP: 128/69 (19 @ 21:37) (105/61 - 145/74)  BP(mean): 90 (19 @ 12:23) (90 - 90)  RR: 16 (19 @ 21:37) (16 - 18)  SpO2: 95% (19 @ 21:37) (93% - 98%)  Wt(kg): --  Daily Height in cm: 187.96 (01 Aug 2019 12:23)    Daily Weight in k.5 (01 Aug 2019 12:23)  I&O's Summary    01 Aug 2019 07:01  -  02 Aug 2019 04:35  --------------------------------------------------------  IN: 240 mL / OUT: 1000 mL / NET: -760 mL          Physical Exam:  Appearance: Normal  Eyes: PERRL, EOMI  HENT: Normal oral muscosa, NC/AT  Cardiovascular: S1S2, RRR, No M/R/G, no JVD, No Lower extremity edema  Procedural Access Site: RRA access  No hematoma, Non-tender to palpation, 2+ pulse, No bruit, No Ecchymosis  Psychiatry: AAOx3, Mood & affect appropriate  Skin: No rashes, No ecchymoses, No cyanosis        138  |  102  |  14  ----------------------------<  166<H>  3.9   |  24  |  1.14    Ca    9.6      02 Aug 2019 00:38        CARDIAC MARKERS ( 2019 08:02 )  .038 ng/mL / x     / x     / x     / x            Lipid panel   Hgb A1c Hemoglobin A1C, Whole Blood: 8.3 % ( @ 08:37)        Interpretation of Telemetry:

## 2019-08-02 NOTE — PROGRESS NOTE ADULT - ASSESSMENT
Patient is a 49y old  Male who presents with a chief complaint of CAD, now s/p diagnostic  LHC revealing, RCA 90%; % via RRA access.  Pt tolerated the procedure well, site benign. Overnight remained uneventful. Pt pending GI clearance for intervention on occluded blood vessels

## 2019-08-02 NOTE — CONSULT NOTE ADULT - SUBJECTIVE AND OBJECTIVE BOX
[facility] - Bolivar Carbajal, El, Brittany, Zahra, Román Jacobs  Office Number: [phone]    Initial Consult Note    CHIEF COMPLAINT: Patient is a 49y old  Male who presents with a chief complaint of CAD (02 Aug 2019 04:35)      HPI:  49M with PMH of PAF on eliquis, RUL subsegmental PE, hx of recent NSTEMI, hx of perforated antral ulcer, osteomyelitis s/p debridement,  HTN and DM2 presenting to the ED [place] on 7/31/19 complaining of chest pain. Chest pain started last night when climbing a flight of stairs. Pain described as sharp, burning pain, originating on left side and spreads across chest. At one point last night it traveled up to his left jaw and down his left arm. At onset, the pain was 10/10. Pain was associated with diaphoresis and SOB. Not exacerbated by activity, position. Patient had a friend take him to the [place] ED after symptoms did not dissipate. Had some SOB when he first arrived to the ED last night, now resolved. No N/V, palpitations, LE edema.  Of note, patient recently admitted to [place] for perforated antral ulcer with emergent ex-lap omentopexy and plication in 5/2019. Post op developed chest pain and found to have new onset Afib, elevated troponin level, diagnosed with NSTEMI. Started on amiodarone, converted to SR. Patient was to follow up outpatient for ischemic eval, but has been at rehab for past 6 weeks and has not had the opportunity to do so. In June, patient returned with chest pain, similar to what he is feeling now. Found to have RUL subsegmental PE. Treated with heparin drip and transitioned to eliquis. Pt was seen by GI Dr Hannah Lentz and cleared the patient for AC. Pt returned back to the ED again 7/28/19 for abdominal pain, diagnosed with constipation. Just discharged from [facility] rehab.    Patient was admitted to [facility] in [place] for unstable angina. Cardiology saw the patient in the ED. They noted no clear evidence of acute ischemia, volume overload, or EKG changes. Due to recent history of NSTEMI, extensive smoking history, and presentation with atypical chest pain, it was deemed appropriate to undergo cardiac cath. (01 Aug 2019 12:23)      PAST MEDICAL & SURGICAL HISTORY:  Osteomyelitis: s/p debridement  History of non-ST elevation myocardial infarction (NSTEMI)  Pulmonary embolism  Perforated gastric ulcer: s/p emergent ex-lap omentopexy and plication 6/2019  BPH (benign prostatic hyperplasia)  Hypertension  Afib  Diabetes mellitus with no complication  Diabetes  Traumatic amputation of left foot, initial encounter  Perforated gastric ulcer  H/O abdominal surgery      SOCIAL HISTORY:  + tobacco, no ethanol, or drug abuse.    FAMILY HISTORY:  FH: stroke: Father  FH: coronary artery disease: Father  FH: pulmonary embolism: Mother    No family history of acute MI or sudden cardiac death.    MEDICATIONS  (STANDING):  amiodarone    Tablet 100 milliGRAM(s) Oral <User Schedule>  aspirin enteric coated 81 milliGRAM(s) Oral daily  atorvastatin 80 milliGRAM(s) Oral at bedtime  dextrose 5%. 1000 milliLiter(s) (50 mL/Hr) IV Continuous <Continuous>  dextrose 50% Injectable 12.5 Gram(s) IV Push once  dextrose 50% Injectable 25 Gram(s) IV Push once  dextrose 50% Injectable 25 Gram(s) IV Push once  docusate sodium 100 milliGRAM(s) Oral three times a day  furosemide    Tablet 40 milliGRAM(s) Oral daily  insulin glargine Injectable (LANTUS) 24 Unit(s) SubCutaneous at bedtime  insulin lispro (HumaLOG) corrective regimen sliding scale   SubCutaneous three times a day before meals  insulin lispro (HumaLOG) corrective regimen sliding scale   SubCutaneous at bedtime  pantoprazole    Tablet 40 milliGRAM(s) Oral before breakfast  polyethylene glycol 3350 17 Gram(s) Oral at bedtime  potassium chloride    Tablet ER 20 milliEquivalent(s) Oral daily  senna 2 Tablet(s) Oral at bedtime  tamsulosin 0.4 milliGRAM(s) Oral at bedtime    MEDICATIONS  (PRN):  dextrose 40% Gel 15 Gram(s) Oral once PRN Blood Glucose LESS THAN 70 milliGRAM(s)/deciliter  glucagon  Injectable 1 milliGRAM(s) IntraMuscular once PRN Glucose LESS THAN 70 milligrams/deciliter      Allergies    fish (Hives)  No Known Drug Allergies    Intolerances        REVIEW OF SYSTEMS:    CONSTITUTIONAL: No weakness, fevers or chills  EYES/ENT: No visual changes;  No vertigo or throat pain   NECK: No pain or stiffness  RESPIRATORY: No cough, wheezing, hemoptysis; No shortness of breath  CARDIOVASCULAR: +chest pain, now resolved, no palpitations  GASTROINTESTINAL: No abdominal pain. No nausea, vomiting, or hematemesis; No diarrhea or constipation. No melena or hematochezia.  GENITOURINARY: No dysuria, frequency or hematuria  NEUROLOGICAL: No numbness or weakness  SKIN: No itching or rash  All other review of systems is negative unless indicated above    VITAL SIGNS:   Vital Signs Last 24 Hrs  T(C): 36.5 (02 Aug 2019 05:10), Max: 36.9 (01 Aug 2019 12:24)  T(F): 97.7 (02 Aug 2019 05:10), Max: 98.5 (01 Aug 2019 21:37)  HR: 58 (02 Aug 2019 05:10) (51 - 95)  BP: 99/60 (02 Aug 2019 05:10) (99/60 - 145/74)  BP(mean): 90 (01 Aug 2019 12:23) (90 - 90)  RR: 18 (02 Aug 2019 05:10) (16 - 18)  SpO2: 97% (02 Aug 2019 05:10) (93% - 98%)    I&O's Summary    01 Aug 2019 07:01  -  02 Aug 2019 06:49  --------------------------------------------------------  IN: 420 mL / OUT: 2700 mL / NET: -2280 mL        On Exam:    Constitutional: NAD, alert and oriented x 3  Lungs:  Non-labored, breath sounds are clear bilaterally, No wheezing, rales or rhonchi  Cardiovascular: braydcardic.  S1 and S2 positive.  No murmurs, rubs, gallops or clicks  Gastrointestinal: Bowel Sounds present, soft, nontender.   Lymph: No peripheral edema. No cervical lymphadenopathy.  Neurological: Alert, no focal deficits  Skin: No rashes or ulcers   Psych:  Mood & affect appropriate.    LABS: All Labs Reviewed:                        10.1   8.8   )-----------( 350      ( 02 Aug 2019 00:38 )             32.2                         9.5    7.67  )-----------( 324      ( 01 Aug 2019 06:09 )             31.0                         10.2   11.04 )-----------( 388      ( 30 Jul 2019 20:53 )             32.8     02 Aug 2019 00:38    138    |  102    |  14     ----------------------------<  166    3.9     |  24     |  1.14   01 Aug 2019 06:09    143    |  109    |  13     ----------------------------<  159    4.2     |  30     |  0.95   30 Jul 2019 20:53    137    |  102    |  15     ----------------------------<  259    4.2     |  24     |  1.40     Ca    9.6        02 Aug 2019 00:38  Ca    9.0        01 Aug 2019 06:09  Ca    8.9        30 Jul 2019 20:53    TPro  8.0    /  Alb  3.3    /  TBili  0.2    /  DBili  x      /  AST  17     /  ALT  24     /  AlkPhos  178    30 Jul 2019 20:53      CARDIAC MARKERS ( 31 Jul 2019 08:02 )  .038 ng/mL / x     / x     / x     / x          Blood Culture:         RADIOLOGY:    EKG: SR

## 2019-08-02 NOTE — CONSULT NOTE ADULT - SUBJECTIVE AND OBJECTIVE BOX
Chief Complaint:  Patient is a 49y old  Male who presents with a chief complaint of CAD (02 Aug 2019 04:35)      HPI:49M with PMH of PAF on eliquis, RUL subsegmental PE, hx of recent NSTEMI, hx of perforated antral ulcer, osteomyelitis s/p debridement,  HTN and DM2 presenting to the ED [place] on 7/31/19 complaining of chest pain. Chest pain started last night when climbing a flight of stairs. Pain described as sharp, burning pain, originating on left side and spreads across chest. At one point last night it traveled up to his left jaw and down his left arm. At onset, the pain was 10/10. Pain was associated with diaphoresis and SOB. Not exacerbated by activity, position. Patient had a friend take him to the [place] ED after symptoms did not dissipate. Had some SOB when he first arrived to the ED last night, now resolved. No N/V, palpitations, LE edema.  Of note, patient recently admitted to [place] for perforated antral ulcer with emergent ex-lap omentopexy and plication in 5/2019. Post op developed chest pain and found to have new onset Afib, elevated troponin level, diagnosed with NSTEMI. Started on amiodarone, converted to SR. Patient was to follow up outpatient for ischemic eval, but has been at rehab for past 6 weeks and has not had the opportunity to do so. In June, patient returned with chest pain, similar to what he is feeling now. Found to have RUL subsegmental PE. Treated with heparin drip and transitioned to eliquis. Pt was seen by us and cleared the patient for AC. Pt returned back to the ED again 7/28/19 for abdominal pain, diagnosed with constipation. Just discharged from [facility] rehab.    Patient was admitted to [facility] in [place] for unstable angina. Cardiology saw the patient in the ED. They noted no clear evidence of acute ischemia, volume overload, or EKG changes. Due to recent history of NSTEMI, extensive smoking history, and presentation with atypical chest pain, it was deemed appropriate to undergo cardiac cath. GI consulted for clearance to be on plavix.. At time of examination pt admits to mild abdominal soreness which he  attributes to constipation, which is chronic. He had a small, brown Bm this morning. He denies nausea, vomiting, brbpr /melena, hx of EGD/colonoscopy. He is tolerating PO well.     Allergies:  fish (Hives)  No Known Drug Allergies      Medications:  amiodarone    Tablet 100 milliGRAM(s) Oral <User Schedule>  apixaban 5 milliGRAM(s) Oral two times a day  aspirin enteric coated 81 milliGRAM(s) Oral daily  atorvastatin 80 milliGRAM(s) Oral at bedtime  clopidogrel Tablet 75 milliGRAM(s) Oral daily  dextrose 40% Gel 15 Gram(s) Oral once PRN  dextrose 5%. 1000 milliLiter(s) IV Continuous <Continuous>  dextrose 50% Injectable 12.5 Gram(s) IV Push once  dextrose 50% Injectable 25 Gram(s) IV Push once  dextrose 50% Injectable 25 Gram(s) IV Push once  docusate sodium 100 milliGRAM(s) Oral three times a day  furosemide    Tablet 40 milliGRAM(s) Oral daily  glucagon  Injectable 1 milliGRAM(s) IntraMuscular once PRN  insulin glargine Injectable (LANTUS) 24 Unit(s) SubCutaneous at bedtime  insulin lispro (HumaLOG) corrective regimen sliding scale   SubCutaneous three times a day before meals  insulin lispro (HumaLOG) corrective regimen sliding scale   SubCutaneous at bedtime  pantoprazole    Tablet 40 milliGRAM(s) Oral two times a day  polyethylene glycol 3350 17 Gram(s) Oral at bedtime  potassium chloride    Tablet ER 20 milliEquivalent(s) Oral daily  senna 2 Tablet(s) Oral at bedtime  tamsulosin 0.4 milliGRAM(s) Oral at bedtime      PMHX/PSHX:  Osteomyelitis  History of non-ST elevation myocardial infarction (NSTEMI)  Pulmonary embolism  Perforated gastric ulcer  BPH (benign prostatic hyperplasia)  Hypertension  Afib  Diabetes mellitus with no complication  No pertinent past medical history  Diabetes  No pertinent past medical history  Traumatic amputation of left foot, initial encounter  Perforated gastric ulcer  H/O abdominal surgery  No significant past surgical history  No significant past surgical history      Family history:  FH: stroke  FH: coronary artery disease  FH: pulmonary embolism  No pertinent family history in first degree relatives  No pertinent family history in first degree relatives      Social History:     ROS:     General:  No wt loss, fevers, chills, night sweats, fatigue,   Eyes:  Good vision, no reported pain  ENT:  No sore throat, pain, runny nose, dysphagia  CV:  No pain, palpitations, hypo/hypertension  Resp:  No dyspnea, cough, tachypnea, wheezing  GI:  + pain, No nausea, No vomiting, No diarrhea, No constipation, No weight loss, No fever, No pruritis, No rectal bleeding, No tarry stools, No dysphagia,  :  No pain, bleeding, incontinence, nocturia  Muscle:  No pain, weakness  Neuro:  No weakness, tingling, memory problems  Psych:  No fatigue, insomnia, mood problems, depression  Endocrine:  No polyuria, polydipsia, cold/heat intolerance  Heme:  No petechiae, ecchymosis, easy bruisability  Skin:  No rash, tattoos, scars, edema      PHYSICAL EXAM:   Vital Signs:  Vital Signs Last 24 Hrs  T(C): 36.5 (02 Aug 2019 05:10), Max: 36.9 (01 Aug 2019 21:37)  T(F): 97.7 (02 Aug 2019 05:10), Max: 98.5 (01 Aug 2019 21:37)  HR: 64 (02 Aug 2019 14:05) (47 - 72)  BP: 105/61 (02 Aug 2019 14:05) (99/59 - 139/66)  BP(mean): --  RR: 18 (02 Aug 2019 14:05) (16 - 18)  SpO2: 96% (02 Aug 2019 14:05) (92% - 98%)  Daily     Daily     GENERAL:  Appears stated age, well-groomed, well-nourished, no distress  HEENT:  NC/AT,  conjunctivae clear and pink, no thyromegaly, nodules, adenopathy, no JVD, sclera -anicteric  CHEST:  Full & symmetric excursion, no increased effort, breath sounds clear  HEART:  Regular rhythm, S1, S2, no murmur/rub/S3/S4, no abdominal bruit, no edema  ABDOMEN:  Soft, non-tender, non-distended, normoactive bowel sounds,  no masses ,no hepato-splenomegaly, no signs of chronic liver disease  EXTEREMITIES:  no cyanosis,clubbing or edema  SKIN:  No rash/erythema/ecchymoses/petechiae/wounds/abscess/warm/dry  NEURO:  Alert, oriented, no asterixis, no tremor, no encephalopathy    LABS:                        10.1   8.8   )-----------( 350      ( 02 Aug 2019 00:38 )             32.2     08-02    138  |  102  |  14  ----------------------------<  166<H>  3.9   |  24  |  1.14    Ca    9.6      02 Aug 2019 00:38                Imaging:

## 2019-08-03 DIAGNOSIS — I25.10 ATHEROSCLEROTIC HEART DISEASE OF NATIVE CORONARY ARTERY WITHOUT ANGINA PECTORIS: ICD-10-CM

## 2019-08-03 DIAGNOSIS — R33.9 RETENTION OF URINE, UNSPECIFIED: ICD-10-CM

## 2019-08-03 DIAGNOSIS — K25.5 CHRONIC OR UNSPECIFIED GASTRIC ULCER WITH PERFORATION: ICD-10-CM

## 2019-08-03 DIAGNOSIS — E11.9 TYPE 2 DIABETES MELLITUS WITHOUT COMPLICATIONS: ICD-10-CM

## 2019-08-03 DIAGNOSIS — I48.0 PAROXYSMAL ATRIAL FIBRILLATION: ICD-10-CM

## 2019-08-03 DIAGNOSIS — I27.82 CHRONIC PULMONARY EMBOLISM: ICD-10-CM

## 2019-08-03 DIAGNOSIS — I10 ESSENTIAL (PRIMARY) HYPERTENSION: ICD-10-CM

## 2019-08-03 DIAGNOSIS — R10.30 LOWER ABDOMINAL PAIN, UNSPECIFIED: ICD-10-CM

## 2019-08-03 LAB
ALBUMIN SERPL ELPH-MCNC: 3.4 G/DL — SIGNIFICANT CHANGE UP (ref 3.3–5)
ALP SERPL-CCNC: 125 U/L — HIGH (ref 40–120)
ALT FLD-CCNC: 9 U/L — LOW (ref 10–45)
ANION GAP SERPL CALC-SCNC: 12 MMOL/L — SIGNIFICANT CHANGE UP (ref 5–17)
AST SERPL-CCNC: 9 U/L — LOW (ref 10–40)
BASOPHILS # BLD AUTO: 0 K/UL — SIGNIFICANT CHANGE UP (ref 0–0.2)
BASOPHILS NFR BLD AUTO: 0.2 % — SIGNIFICANT CHANGE UP (ref 0–2)
BILIRUB SERPL-MCNC: 0.2 MG/DL — SIGNIFICANT CHANGE UP (ref 0.2–1.2)
BUN SERPL-MCNC: 18 MG/DL — SIGNIFICANT CHANGE UP (ref 7–23)
CALCIUM SERPL-MCNC: 9.5 MG/DL — SIGNIFICANT CHANGE UP (ref 8.4–10.5)
CHLORIDE SERPL-SCNC: 106 MMOL/L — SIGNIFICANT CHANGE UP (ref 96–108)
CO2 SERPL-SCNC: 24 MMOL/L — SIGNIFICANT CHANGE UP (ref 22–31)
CREAT SERPL-MCNC: 1.12 MG/DL — SIGNIFICANT CHANGE UP (ref 0.5–1.3)
EOSINOPHIL # BLD AUTO: 0.3 K/UL — SIGNIFICANT CHANGE UP (ref 0–0.5)
EOSINOPHIL NFR BLD AUTO: 4.6 % — SIGNIFICANT CHANGE UP (ref 0–6)
GLUCOSE BLDC GLUCOMTR-MCNC: 118 MG/DL — HIGH (ref 70–99)
GLUCOSE BLDC GLUCOMTR-MCNC: 158 MG/DL — HIGH (ref 70–99)
GLUCOSE BLDC GLUCOMTR-MCNC: 207 MG/DL — HIGH (ref 70–99)
GLUCOSE BLDC GLUCOMTR-MCNC: 225 MG/DL — HIGH (ref 70–99)
GLUCOSE SERPL-MCNC: 137 MG/DL — HIGH (ref 70–99)
HCT VFR BLD CALC: 29.8 % — LOW (ref 39–50)
HGB BLD-MCNC: 9.8 G/DL — LOW (ref 13–17)
LYMPHOCYTES # BLD AUTO: 1.6 K/UL — SIGNIFICANT CHANGE UP (ref 1–3.3)
LYMPHOCYTES # BLD AUTO: 23.2 % — SIGNIFICANT CHANGE UP (ref 13–44)
MCHC RBC-ENTMCNC: 29.5 PG — SIGNIFICANT CHANGE UP (ref 27–34)
MCHC RBC-ENTMCNC: 32.8 GM/DL — SIGNIFICANT CHANGE UP (ref 32–36)
MCV RBC AUTO: 89.9 FL — SIGNIFICANT CHANGE UP (ref 80–100)
MONOCYTES # BLD AUTO: 0.9 K/UL — SIGNIFICANT CHANGE UP (ref 0–0.9)
MONOCYTES NFR BLD AUTO: 12.8 % — SIGNIFICANT CHANGE UP (ref 2–14)
NEUTROPHILS # BLD AUTO: 4.2 K/UL — SIGNIFICANT CHANGE UP (ref 1.8–7.4)
NEUTROPHILS NFR BLD AUTO: 59.2 % — SIGNIFICANT CHANGE UP (ref 43–77)
PLATELET # BLD AUTO: 307 K/UL — SIGNIFICANT CHANGE UP (ref 150–400)
POTASSIUM SERPL-MCNC: 4 MMOL/L — SIGNIFICANT CHANGE UP (ref 3.5–5.3)
POTASSIUM SERPL-SCNC: 4 MMOL/L — SIGNIFICANT CHANGE UP (ref 3.5–5.3)
PROT SERPL-MCNC: 6.7 G/DL — SIGNIFICANT CHANGE UP (ref 6–8.3)
RBC # BLD: 3.32 M/UL — LOW (ref 4.2–5.8)
RBC # FLD: 13 % — SIGNIFICANT CHANGE UP (ref 10.3–14.5)
SODIUM SERPL-SCNC: 142 MMOL/L — SIGNIFICANT CHANGE UP (ref 135–145)
WBC # BLD: 7.1 K/UL — SIGNIFICANT CHANGE UP (ref 3.8–10.5)
WBC # FLD AUTO: 7.1 K/UL — SIGNIFICANT CHANGE UP (ref 3.8–10.5)

## 2019-08-03 PROCEDURE — 99233 SBSQ HOSP IP/OBS HIGH 50: CPT

## 2019-08-03 PROCEDURE — 99238 HOSP IP/OBS DSCHRG MGMT 30/<: CPT

## 2019-08-03 PROCEDURE — 99232 SBSQ HOSP IP/OBS MODERATE 35: CPT

## 2019-08-03 RX ORDER — TRAMADOL HYDROCHLORIDE 50 MG/1
25 TABLET ORAL EVERY 12 HOURS
Refills: 0 | Status: DISCONTINUED | OUTPATIENT
Start: 2019-08-03 | End: 2019-08-09

## 2019-08-03 RX ORDER — CLOPIDOGREL BISULFATE 75 MG/1
1 TABLET, FILM COATED ORAL
Qty: 90 | Refills: 3
Start: 2019-08-03 | End: 2020-07-27

## 2019-08-03 RX ORDER — METFORMIN HYDROCHLORIDE 850 MG/1
0 TABLET ORAL
Qty: 0 | Refills: 0 | DISCHARGE

## 2019-08-03 RX ORDER — INSULIN GLARGINE 100 [IU]/ML
15 INJECTION, SOLUTION SUBCUTANEOUS
Qty: 0 | Refills: 0 | DISCHARGE

## 2019-08-03 RX ORDER — POLYETHYLENE GLYCOL 3350 17 G/17G
0 POWDER, FOR SOLUTION ORAL
Qty: 0 | Refills: 0 | DISCHARGE

## 2019-08-03 RX ORDER — OXYCODONE AND ACETAMINOPHEN 5; 325 MG/1; MG/1
1 TABLET ORAL ONCE
Refills: 0 | Status: DISCONTINUED | OUTPATIENT
Start: 2019-08-03 | End: 2019-08-03

## 2019-08-03 RX ADMIN — Medication 100 MILLIGRAM(S): at 21:59

## 2019-08-03 RX ADMIN — OXYCODONE AND ACETAMINOPHEN 1 TABLET(S): 5; 325 TABLET ORAL at 05:37

## 2019-08-03 RX ADMIN — AMIODARONE HYDROCHLORIDE 100 MILLIGRAM(S): 400 TABLET ORAL at 10:15

## 2019-08-03 RX ADMIN — Medication 1 GRAM(S): at 11:48

## 2019-08-03 RX ADMIN — Medication 100 MILLIGRAM(S): at 05:37

## 2019-08-03 RX ADMIN — TAMSULOSIN HYDROCHLORIDE 0.4 MILLIGRAM(S): 0.4 CAPSULE ORAL at 21:58

## 2019-08-03 RX ADMIN — PANTOPRAZOLE SODIUM 40 MILLIGRAM(S): 20 TABLET, DELAYED RELEASE ORAL at 17:56

## 2019-08-03 RX ADMIN — APIXABAN 5 MILLIGRAM(S): 2.5 TABLET, FILM COATED ORAL at 22:30

## 2019-08-03 RX ADMIN — SENNA PLUS 2 TABLET(S): 8.6 TABLET ORAL at 21:59

## 2019-08-03 RX ADMIN — INSULIN GLARGINE 24 UNIT(S): 100 INJECTION, SOLUTION SUBCUTANEOUS at 21:58

## 2019-08-03 RX ADMIN — Medication 81 MILLIGRAM(S): at 05:37

## 2019-08-03 RX ADMIN — Medication 2: at 11:48

## 2019-08-03 RX ADMIN — TRAMADOL HYDROCHLORIDE 25 MILLIGRAM(S): 50 TABLET ORAL at 21:58

## 2019-08-03 RX ADMIN — Medication 20 MILLIEQUIVALENT(S): at 11:48

## 2019-08-03 RX ADMIN — ATORVASTATIN CALCIUM 80 MILLIGRAM(S): 80 TABLET, FILM COATED ORAL at 21:59

## 2019-08-03 RX ADMIN — Medication 1 GRAM(S): at 17:56

## 2019-08-03 RX ADMIN — OXYCODONE AND ACETAMINOPHEN 1 TABLET(S): 5; 325 TABLET ORAL at 06:30

## 2019-08-03 RX ADMIN — PANTOPRAZOLE SODIUM 40 MILLIGRAM(S): 20 TABLET, DELAYED RELEASE ORAL at 05:37

## 2019-08-03 RX ADMIN — APIXABAN 5 MILLIGRAM(S): 2.5 TABLET, FILM COATED ORAL at 10:15

## 2019-08-03 RX ADMIN — Medication 1 GRAM(S): at 00:40

## 2019-08-03 RX ADMIN — Medication 1 GRAM(S): at 05:37

## 2019-08-03 RX ADMIN — TRAMADOL HYDROCHLORIDE 25 MILLIGRAM(S): 50 TABLET ORAL at 22:00

## 2019-08-03 RX ADMIN — POLYETHYLENE GLYCOL 3350 17 GRAM(S): 17 POWDER, FOR SOLUTION ORAL at 17:57

## 2019-08-03 RX ADMIN — CLOPIDOGREL BISULFATE 75 MILLIGRAM(S): 75 TABLET, FILM COATED ORAL at 05:36

## 2019-08-03 RX ADMIN — Medication 40 MILLIGRAM(S): at 05:37

## 2019-08-03 NOTE — PHYSICAL THERAPY INITIAL EVALUATION ADULT - PLANNED THERAPY INTERVENTIONS, PT EVAL
gait training/transfer training/stairs : GOAL: patient will be able to negotiated 12 stairs (I) with one HR in 2 weeks/bed mobility training

## 2019-08-03 NOTE — PHYSICAL THERAPY INITIAL EVALUATION ADULT - DISCHARGE DISPOSITION, PT EVAL
home w/ home PT/home/home w/ assist/Home with Home PT for strengthening, bed mob, transfer, gait and balance training, home with assist as needed,

## 2019-08-03 NOTE — PROGRESS NOTE ADULT - PROBLEM SELECTOR PLAN 1
S/P cardiac cath with mild LAD disease, 90% RCA and 100% RPL. He is now s/p BMS to RCA  CW ASA, Plavix, Atorvastatin  BB was on HOLD due to bradycardia   Not on ACEI   MOnitor HR

## 2019-08-03 NOTE — PHYSICAL THERAPY INITIAL EVALUATION ADULT - PERTINENT HX OF CURRENT PROBLEM, REHAB EVAL
49M with PMH of PAF on eliquis, RUL subsegmental PE, hx of recent NSTEMI, hx of perforated antral ulcer, osteomyelitis s/p debridement,  HTN and DM2 presenting to the ED [place] on 7/31/19 complaining of chest pain. Chest pain started last night when climbing a flight of stairs. Pain described as sharp, burning pain, originating on left side and spreads across chest. found mild LAD, 90% RCA, 100% RPL, now s/p cardiac cath with BMS to RCA

## 2019-08-03 NOTE — PROGRESS NOTE ADULT - PROBLEM SELECTOR PLAN 1
-hgb stable, reports passing bloody stool last evening  - continue ppi/carafate  - monitor cbc, transfuse prn  - no gi objection to triple therapy   - monitor stool color closely.

## 2019-08-03 NOTE — PHYSICAL THERAPY INITIAL EVALUATION ADULT - ADDITIONAL COMMENTS
as per pt, resides in a  with family, 3 stairs to enter with railings, PTA, pt was in/out rehab/hospital for the past 3 months, prior to that, pt was amb (I), (I) with ADls, in rehab, pt was amb with RW,

## 2019-08-03 NOTE — DISCHARGE NOTE PROVIDER - CARE PROVIDERS DIRECT ADDRESSES
,[email or web address],DirectAddress_Unknown

## 2019-08-03 NOTE — DISCHARGE NOTE PROVIDER - NSDCFUADDINST_GEN_ALL_CORE_FT
No heavy lifting for 2 weeks, no strenuous activity  (pushing/ pulling) no driving for x 2 days,  you may shower 24 hours following procedure but no bathing or swimming for x1  week, no strenuous activities, sex for x 1 week & follow up with your cardiologist in 1-2 week

## 2019-08-03 NOTE — CHART NOTE - NSCHARTNOTEFT_GEN_A_CORE
Notified by RN, patient with hematuria, patient seen and evaluated at bedside in NAD, denies any new complaints VSS, lying in bed, patient with jacques for retention that was placed in the ED, cherry to light red noted, s/p flush x 2, with clearance and light tinged red to yellow urine, patient with no acute pain or discomfort, with jacques or with bladder, jacques draining, no overt clots noted. Of note patient on Eliquis for hx of PAF, as well as PE, hgb stable at 9.8, will send stat cbc to assess stability. Will consider urology consultation, if persistent hematuria/ new onset pain or blood clots present within jacques. Will continue to monitor drainage/color, RN informed of plan as well as patient. will continue to monitor patient closely, will endorse to AM team, attending to f/u in AM.     Jack Marcial PA-C   Department of Medicine Notified by RN, patient with hematuria, patient seen and evaluated at bedside in NAD, denies any new complaints VSS (no tachycardia or hypotension), lying in bed, patient with jacques for retention that was placed in the ED, cherry to light red noted, s/p flush x 2, with clearance and light tinged red to yellow urine, patient with no acute pain or discomfort, with jacques or with bladder, jacques draining, no overt clots noted. Of note patient on Eliquis for hx of PAF, as well as PE, hgb stable at 9.8, will send stat cbc to assess stability, type and screen in AM. Will consider urology consultation, if persistent hematuria/ new onset pain or blood clots present within jacques. Will continue to monitor drainage/color, RN informed of plan as well as patient. will continue to monitor patient closely, will endorse to AM team, attending to f/u in AM.     Jack Marcial PA-C   Department of Medicine Notified by RN, patient with hematuria -- > 2/2 ? trauma from jacques insertion while on anticoagulation. Patient seen and evaluated at bedside in NAD, denies any new complaints VSS (no tachycardia or hypotension), lying in bed, patient with jacques for retention that was placed in the ED, cherry to light red noted, s/p flush x 2, with clearance and light tinged red to yellow urine, patient with no acute pain or discomfort, with jacques or with bladder, jacques draining, no overt clots noted. Of note patient on Eliquis for hx of PAF, as well as PE, hgb stable at 9.8, will send stat cbc to assess stability, type and screen in AM. Will consider urology consultation, if persistent hematuria/ new onset pain or blood clots present within jacques. Will continue to monitor drainage/color, RN informed of plan as well as patient. will continue to monitor patient closely, will endorse to AM team, attending to f/u in AM.     Jack Marcial PA-C   Department of Medicine

## 2019-08-03 NOTE — DISCHARGE NOTE PROVIDER - NSDCPNSUBOBJ_GEN_ALL_CORE
Patient is a 49y old  Male who presents with a chief complaint of CAD (02 Aug 2019 04:35)                    Allergies        fish (Hives)    No Known Drug Allergies        Intolerances                Medications:    amiodarone    Tablet 100 milliGRAM(s) Oral <User Schedule>    apixaban 5 milliGRAM(s) Oral two times a day    aspirin enteric coated 81 milliGRAM(s) Oral daily    atorvastatin 80 milliGRAM(s) Oral at bedtime    clopidogrel Tablet 75 milliGRAM(s) Oral daily    dextrose 40% Gel 15 Gram(s) Oral once PRN    dextrose 5%. 1000 milliLiter(s) IV Continuous <Continuous>    dextrose 50% Injectable 12.5 Gram(s) IV Push once    dextrose 50% Injectable 25 Gram(s) IV Push once    dextrose 50% Injectable 25 Gram(s) IV Push once    docusate sodium 100 milliGRAM(s) Oral three times a day    furosemide    Tablet 40 milliGRAM(s) Oral daily    glucagon  Injectable 1 milliGRAM(s) IntraMuscular once PRN    insulin glargine Injectable (LANTUS) 24 Unit(s) SubCutaneous at bedtime    insulin lispro (HumaLOG) corrective regimen sliding scale   SubCutaneous three times a day before meals    insulin lispro (HumaLOG) corrective regimen sliding scale   SubCutaneous at bedtime    magnesium citrate Oral Solution 1 Bottle Oral once    pantoprazole    Tablet 40 milliGRAM(s) Oral two times a day    polyethylene glycol 3350 17 Gram(s) Oral two times a day    potassium chloride    Tablet ER 20 milliEquivalent(s) Oral daily    senna 2 Tablet(s) Oral at bedtime    sucralfate 1 Gram(s) Oral four times a day    tamsulosin 0.4 milliGRAM(s) Oral at bedtime            Vitals:    T(C): 36.9 (08-02-19 @ 21:09), Max: 36.9 (08-02-19 @ 21:09)    HR: 55 (08-02-19 @ 21:09) (47 - 65)    BP: 138/80 (08-02-19 @ 21:09) (99/59 - 138/80)    BP(mean): --    RR: 18 (08-02-19 @ 21:09) (18 - 18)    SpO2: 97% (08-02-19 @ 21:09) (92% - 98%)    Wt(kg): --    Daily       Daily     I&O's Summary        01 Aug 2019 07:01  -  02 Aug 2019 07:00    --------------------------------------------------------    IN: 420 mL / OUT: 2700 mL / NET: -2280 mL        02 Aug 2019 07:01  -  03 Aug 2019 01:38    --------------------------------------------------------    IN: 1040 mL / OUT: 3050 mL / NET: -2010 mL                    Physical Exam:    Appearance: Normal    Eyes: PERRL, EOMI    HENT: Normal oral muscosa, NC/AT    Cardiovascular: S1S2, RRR, No M/R/G, no JVD, No Lower extremity edema    Procedural Access Site: No hematoma, Non-tender to palpation, 2+ pulse, No bruit, No Ecchymosis    Respiratory: Clear to auscultation bilaterally    Gastrointestinal: Soft, Non tender, Normal Bowel Sounds    Musculoskeletal: No clubbing, No joint deformity     Neurologic: Non-focal    Lymphatic: No lymphadenopathy    Psychiatry: AAOx3, Mood & affect appropriate    Skin: No rashes, No ecchymoses, No cyanosis        08-02        138  |  102  |  14    ----------------------------<  166<H>    3.9   |  24  |  1.14        Ca    9.6      02 Aug 2019 00:38                            Lipid panel     Hgb A1c                             10.1     8.8   )-----------( 350      ( 02 Aug 2019 00:38 )               32.2                 ECG: SB 54 bpm        Cath: one mid RCA stent        Imaging:        Interpretation of Telemetry:            CAD    Monitor radial site for swelling, bleeding    Continue ASA, Plavix         HTN    Continue antihypertensive medications with hold parameters        HLD    continue statin    confirm lipid panel results

## 2019-08-03 NOTE — DISCHARGE NOTE PROVIDER - NSDCFUADDAPPT_GEN_ALL_CORE_FT
FOLLOW-UP NEXT WEEK WITH UROLOGY TO EVALUATE REMOVING RODRIGUEZ CATHETER  [facility]  Urologist in [place], [place]  Address: [address] [place] [address], [place] [address]  Phone: [phone]

## 2019-08-03 NOTE — PROGRESS NOTE ADULT - SUBJECTIVE AND OBJECTIVE BOX
Patient is a 49y old  Male who presents with a chief complaint of chest pain (03 Aug 2019 06:50)      INTERVAL HPI/OVERNIGHT EVENTS:  Hospitalist Acceptance note     49M with PMH of PAF on eliquis, RUL subsegmental PE, hx of recent NSTEMI, hx of perforated antral ulcer (S/P  emergent ex-lap omentopexy and plication in 5/2019) , osteomyelitis s/p debridement,  HTN and DM2 presented to the ED at [place] on 7/31/19 complaining of chest pain.  On 8/1/19, patient had diagnostic  LHC revealing, RCA 90%; %.  He is now s/p BMS to RCA.        Medications:MEDICATIONS  (STANDING):  amiodarone    Tablet 100 milliGRAM(s) Oral <User Schedule>  apixaban 5 milliGRAM(s) Oral two times a day  aspirin enteric coated 81 milliGRAM(s) Oral daily  atorvastatin 80 milliGRAM(s) Oral at bedtime  clopidogrel Tablet 75 milliGRAM(s) Oral daily  dextrose 5%. 1000 milliLiter(s) (50 mL/Hr) IV Continuous <Continuous>  dextrose 50% Injectable 12.5 Gram(s) IV Push once  dextrose 50% Injectable 25 Gram(s) IV Push once  dextrose 50% Injectable 25 Gram(s) IV Push once  docusate sodium 100 milliGRAM(s) Oral three times a day  furosemide    Tablet 40 milliGRAM(s) Oral daily  insulin glargine Injectable (LANTUS) 24 Unit(s) SubCutaneous at bedtime  insulin lispro (HumaLOG) corrective regimen sliding scale   SubCutaneous three times a day before meals  insulin lispro (HumaLOG) corrective regimen sliding scale   SubCutaneous at bedtime  magnesium citrate Oral Solution 1 Bottle Oral once  pantoprazole    Tablet 40 milliGRAM(s) Oral two times a day  polyethylene glycol 3350 17 Gram(s) Oral two times a day  potassium chloride    Tablet ER 20 milliEquivalent(s) Oral daily  senna 2 Tablet(s) Oral at bedtime  sucralfate 1 Gram(s) Oral four times a day  tamsulosin 0.4 milliGRAM(s) Oral at bedtime    MEDICATIONS  (PRN):  dextrose 40% Gel 15 Gram(s) Oral once PRN Blood Glucose LESS THAN 70 milliGRAM(s)/deciliter  glucagon  Injectable 1 milliGRAM(s) IntraMuscular once PRN Glucose LESS THAN 70 milligrams/deciliter      Allergies: Allergies    fish (Hives)  No Known Drug Allergies    Intolerances        REVIEW OF SYSTEMS:  CONSTITUTIONAL: No fever, weight loss, or fatigue  EYES: No eye pain, visual disturbances, or discharge  ENMT:  No difficulty hearing, tinnitus, vertigo; No sinus or throat pain  NECK: No pain or stiffness  BREASTS: No pain, masses, or nipple discharge  RESPIRATORY: No cough, wheezing, chills or hemoptysis; No shortness of breath  CARDIOVASCULAR: No chest pain, palpitations, dizziness, or leg swelling  GASTROINTESTINAL: No abdominal or epigastric pain. No nausea, vomiting, or hematemesis; No diarrhea or constipation. No melena or hematochezia.  GENITOURINARY: No dysuria, frequency, hematuria, or incontinence  NEUROLOGICAL: No headaches, memory loss, loss of strength, numbness, or tremors  SKIN: No itching, burning, rashes, or lesions   LYMPH NODES: No enlarged glands  ENDOCRINE: No heat or cold intolerance; No hair loss  MUSCULOSKELETAL: No joint pain or swelling; No muscle, back, or extremity pain  PSYCHIATRIC: No depression, anxiety, mood swings, or difficulty sleeping  HEME/LYMPH: No easy bruising, or bleeding gums  ALLERY AND IMMUNOLOGIC: No hives or eczema    T(C): 36.9 (08-03-19 @ 14:27), Max: 37.1 (08-03-19 @ 04:52)  HR: 63 (08-03-19 @ 14:27) (55 - 64)  BP: 154/89 (08-03-19 @ 14:27) (116/59 - 154/89)  RR: 18 (08-03-19 @ 14:27) (18 - 18)  SpO2: 97% (08-03-19 @ 14:27) (95% - 97%)  Wt(kg): --Vital Signs Last 24 Hrs  T(C): 36.9 (03 Aug 2019 14:27), Max: 37.1 (03 Aug 2019 04:52)  T(F): 98.4 (03 Aug 2019 14:27), Max: 98.7 (03 Aug 2019 04:52)  HR: 63 (03 Aug 2019 14:27) (55 - 64)  BP: 154/89 (03 Aug 2019 14:27) (116/59 - 154/89)  BP(mean): --  RR: 18 (03 Aug 2019 14:27) (18 - 18)  SpO2: 97% (03 Aug 2019 14:27) (95% - 97%)  I&O's Summary    02 Aug 2019 07:01  -  03 Aug 2019 07:00  --------------------------------------------------------  IN: 1040 mL / OUT: 3850 mL / NET: -2810 mL      Last Menstrual Period      PHYSICAL EXAM:  GENERAL: NAD, well-groomed, well-developed  HEAD:  Atraumatic, Normocephalic  EYES: EOMI, PERRLA, conjunctiva and sclera clear  ENMT: No tonsillar erythema, exudates, or enlargement; Moist mucous membranes, Good dentition, No lesions  NECK: Supple, No JVD, Normal thyroid  NERVOUS SYSTEM:  Alert & Oriented X3, Good concentration; Motor Strength 5/5 B/L upper and lower extremities; DTRs 2+ intact and symmetric  CHEST/LUNG: Clear to percussion bilaterally; No rales, rhonchi, wheezing, or rubs  HEART: Regular rate and rhythm; No murmurs, rubs, or gallops  ABDOMEN: Soft, Nontender, Nondistended; Bowel sounds present  EXTREMITIES:  2+ Peripheral Pulses, No clubbing, cyanosis, or edema  LYMPH: No lymphadenopathy noted  SKIN: No rashes or lesions    Consultant(s) Notes Reviewed:  [x ] YES  [ ] NO  Care Discussed with Consultants/Other Providers [ x] YES  [ ] NO  Name of Consultant  LABS:                        9.8    7.1   )-----------( 307      ( 03 Aug 2019 04:44 )             29.8     08-03    142  |  106  |  18  ----------------------------<  137<H>  4.0   |  24  |  1.12    Ca    9.5      03 Aug 2019 04:44    TPro  6.7  /  Alb  3.4  /  TBili  0.2  /  DBili  x   /  AST  9<L>  /  ALT  9<L>  /  AlkPhos  125<H>  08-03        CAPILLARY BLOOD GLUCOSE      POCT Blood Glucose.: 207 mg/dL (03 Aug 2019 11:42)  POCT Blood Glucose.: 158 mg/dL (03 Aug 2019 07:19)  POCT Blood Glucose.: 202 mg/dL (02 Aug 2019 20:54)            RADIOLOGY & ADDITIONAL TESTS:  EKG :     Imaging Personally Reviewed:  [ ] YES  [ ] NO  HEALTH ISSUES - PROBLEM Dx:  ACP (advance care planning): ACP (advance care planning)  CAD (coronary artery disease): CAD (coronary artery disease)  Constipation: Constipation  Anemia: Anemia Patient is a 49y old  Male who presents with a chief complaint of chest pain (03 Aug 2019 06:50)      INTERVAL HPI/OVERNIGHT EVENTS:  Hospitalist Acceptance note     49M with PMH of PAF on eliquis, RUL subsegmental PE, hx of recent NSTEMI, hx of perforated antral ulcer (S/P  emergent ex-lap omentopexy and plication in 5/2019) , osteomyelitis s/p debridement,  HTN and DM2 presented to the ED at [place] on 7/31/19 complaining of chest pain.  On 8/1/19, patient had diagnostic  LHC revealing, RCA 90%; %.  He is now s/p BMS to RCA.   Patient was seen by GI and was cleared for Eliquis/Plavix and ASA.  Patient c/o constipation but has moved BM one day ago.  He states to have lower abd pain for many weeks and has no relief and is unable to function with the recurrent b/l lower abd pain rated as bed and unable to quantify with no known aggravating factors and not alleviated with no tylenol and Oxycodone makes dizzy and pain is crampy and intermittent.  Had vomiting once , no current nausea.        Medications:MEDICATIONS  (STANDING):  amiodarone    Tablet 100 milliGRAM(s) Oral <User Schedule>  apixaban 5 milliGRAM(s) Oral two times a day  aspirin enteric coated 81 milliGRAM(s) Oral daily  atorvastatin 80 milliGRAM(s) Oral at bedtime  clopidogrel Tablet 75 milliGRAM(s) Oral daily  dextrose 5%. 1000 milliLiter(s) (50 mL/Hr) IV Continuous <Continuous>  dextrose 50% Injectable 12.5 Gram(s) IV Push once  dextrose 50% Injectable 25 Gram(s) IV Push once  dextrose 50% Injectable 25 Gram(s) IV Push once  docusate sodium 100 milliGRAM(s) Oral three times a day  furosemide    Tablet 40 milliGRAM(s) Oral daily  insulin glargine Injectable (LANTUS) 24 Unit(s) SubCutaneous at bedtime  insulin lispro (HumaLOG) corrective regimen sliding scale   SubCutaneous three times a day before meals  insulin lispro (HumaLOG) corrective regimen sliding scale   SubCutaneous at bedtime  magnesium citrate Oral Solution 1 Bottle Oral once  pantoprazole    Tablet 40 milliGRAM(s) Oral two times a day  polyethylene glycol 3350 17 Gram(s) Oral two times a day  potassium chloride    Tablet ER 20 milliEquivalent(s) Oral daily  senna 2 Tablet(s) Oral at bedtime  sucralfate 1 Gram(s) Oral four times a day  tamsulosin 0.4 milliGRAM(s) Oral at bedtime    MEDICATIONS  (PRN):  dextrose 40% Gel 15 Gram(s) Oral once PRN Blood Glucose LESS THAN 70 milliGRAM(s)/deciliter  glucagon  Injectable 1 milliGRAM(s) IntraMuscular once PRN Glucose LESS THAN 70 milligrams/deciliter      Allergies: Allergies    fish (Hives)  No Known Drug Allergies        REVIEW OF SYSTEMS:  CONSTITUTIONAL: No fever  ENMT:  No sinus or throat pain  NECK: No pain or stiffness  RESPIRATORY: No cough, wheezing, chills or hemoptysis; No shortness of breath  CARDIOVASCULAR: No chest pain, palpitations, dizziness, or leg swelling  GASTROINTESTINAL: POS abdominal/ see HPI,  pos reported constipation.   GENITOURINARY: No dysuria/ has Jacques in place   NEUROLOGICAL: No headaches      T(C): 36.9 (08-03-19 @ 14:27), Max: 37.1 (08-03-19 @ 04:52)  HR: 63 (08-03-19 @ 14:27) (55 - 64)  BP: 154/89 (08-03-19 @ 14:27) (116/59 - 154/89)  RR: 18 (08-03-19 @ 14:27) (18 - 18)  SpO2: 97% (08-03-19 @ 14:27) (95% - 97%)  Wt(kg): --Vital Signs Last 24 Hrs  T(C): 36.9 (03 Aug 2019 14:27), Max: 37.1 (03 Aug 2019 04:52)  T(F): 98.4 (03 Aug 2019 14:27), Max: 98.7 (03 Aug 2019 04:52)  HR: 63 (03 Aug 2019 14:27) (55 - 64)  BP: 154/89 (03 Aug 2019 14:27) (116/59 - 154/89)  BP(mean): --  RR: 18 (03 Aug 2019 14:27) (18 - 18)  SpO2: 97% (03 Aug 2019 14:27) (95% - 97%)  I&O's Summary    02 Aug 2019 07:01  -  03 Aug 2019 07:00  --------------------------------------------------------  IN: 1040 mL / OUT: 3850 mL / NET: -2810 mL        PHYSICAL EXAM:  GENERAL: NAD, well-groomed, well-developed  HEAD:  Atraumatic, Normocephalic  NECK: Supple, No JVD, Normal thyroid  NERVOUS SYSTEM:  Alert & Oriented X3, Good concentration  CHEST/LUNG: Clear to percussion bilaterally; No rales, rhonchi, wheezing, or rubs  HEART: Regular rate and rhythm; No murmurs, rubs, or gallops  ABDOMEN: Soft, pos umbilical hernia ( chronic as per pt) no rash , pos tenderness to palpation to the LT and RT lower quadrants with no rebound tenderness or involuntary guarding.   Bowel sounds present  EXTREMITIES:  Left dressing in place / not removed      : patient has jacques in place ( placed at [facility] as per patient due to urinary retention)       Consultant(s) Notes Reviewed:  [x ] YES  [ ] NO  Care Discussed with Consultants/Other Providers [ x] YES  [ ] NO  Name of Consultant  LABS:                        9.8    7.1   )-----------( 307      ( 03 Aug 2019 04:44 )             29.8     08-03    142  |  106  |  18  ----------------------------<  137<H>  4.0   |  24  |  1.12    Ca    9.5      03 Aug 2019 04:44    TPro  6.7  /  Alb  3.4  /  TBili  0.2  /  DBili  x   /  AST  9<L>  /  ALT  9<L>  /  AlkPhos  125<H>  08-03        CAPILLARY BLOOD GLUCOSE      POCT Blood Glucose.: 207 mg/dL (03 Aug 2019 11:42)  POCT Blood Glucose.: 158 mg/dL (03 Aug 2019 07:19)  POCT Blood Glucose.: 202 mg/dL (02 Aug 2019 20:54)            RADIOLOGY & ADDITIONAL TESTS:  EKG :     Imaging Personally Reviewed:  [ ] YES  [ ] NO  HEALTH ISSUES - PROBLEM Dx:  ACP (advance care planning): ACP (advance care planning)  CAD (coronary artery disease): CAD (coronary artery disease)  Constipation: Constipation  Anemia: Anemia Patient is a 49y old  Male who presents with a chief complaint of chest pain (03 Aug 2019 06:50)      INTERVAL HPI/OVERNIGHT EVENTS:  Hospitalist Acceptance note     49M with PMH of PAF on eliquis, RUL subsegmental PE, hx of recent NSTEMI, hx of perforated antral ulcer (S/P  emergent ex-lap omentopexy and plication in 5/2019) , osteomyelitis s/p debridement,  HTN and DM2 presented to the ED at [place] on 7/31/19 complaining of chest pain.  On 8/1/19, patient had diagnostic  LHC revealing, RCA 90%; %.  He is now s/p BMS to RCA.   Patient was seen by GI and was cleared for Eliquis/Plavix and ASA.  Patient c/o constipation but has moved BM one day ago.  He states to have lower abd pain for many weeks and has no relief and is unable to function with the recurrent b/l lower abd pain rated as bed and unable to quantify with no known aggravating factors and not alleviated with no tylenol and Oxycodone makes dizzy and pain is crampy and intermittent.  Had vomiting once , no current nausea.        Medications:MEDICATIONS  (STANDING):  amiodarone    Tablet 100 milliGRAM(s) Oral <User Schedule>  apixaban 5 milliGRAM(s) Oral two times a day  aspirin enteric coated 81 milliGRAM(s) Oral daily  atorvastatin 80 milliGRAM(s) Oral at bedtime  clopidogrel Tablet 75 milliGRAM(s) Oral daily  dextrose 5%. 1000 milliLiter(s) (50 mL/Hr) IV Continuous <Continuous>  dextrose 50% Injectable 12.5 Gram(s) IV Push once  dextrose 50% Injectable 25 Gram(s) IV Push once  dextrose 50% Injectable 25 Gram(s) IV Push once  docusate sodium 100 milliGRAM(s) Oral three times a day  furosemide    Tablet 40 milliGRAM(s) Oral daily  insulin glargine Injectable (LANTUS) 24 Unit(s) SubCutaneous at bedtime  insulin lispro (HumaLOG) corrective regimen sliding scale   SubCutaneous three times a day before meals  insulin lispro (HumaLOG) corrective regimen sliding scale   SubCutaneous at bedtime  magnesium citrate Oral Solution 1 Bottle Oral once  pantoprazole    Tablet 40 milliGRAM(s) Oral two times a day  polyethylene glycol 3350 17 Gram(s) Oral two times a day  potassium chloride    Tablet ER 20 milliEquivalent(s) Oral daily  senna 2 Tablet(s) Oral at bedtime  sucralfate 1 Gram(s) Oral four times a day  tamsulosin 0.4 milliGRAM(s) Oral at bedtime    MEDICATIONS  (PRN):  dextrose 40% Gel 15 Gram(s) Oral once PRN Blood Glucose LESS THAN 70 milliGRAM(s)/deciliter  glucagon  Injectable 1 milliGRAM(s) IntraMuscular once PRN Glucose LESS THAN 70 milligrams/deciliter      Allergies: Allergies    fish (Hives)  No Known Drug Allergies        REVIEW OF SYSTEMS:  CONSTITUTIONAL: No fever  ENMT:  No sinus or throat pain  NECK: No pain or stiffness  RESPIRATORY: No cough, wheezing, chills or hemoptysis; No shortness of breath  CARDIOVASCULAR: No chest pain, palpitations, dizziness, or leg swelling  GASTROINTESTINAL: POS abdominal/ see HPI,  pos reported constipation.   GENITOURINARY: No dysuria/ has Jacques in place   NEUROLOGICAL: No headaches      T(C): 36.9 (08-03-19 @ 14:27), Max: 37.1 (08-03-19 @ 04:52)  HR: 63 (08-03-19 @ 14:27) (55 - 64)  BP: 154/89 (08-03-19 @ 14:27) (116/59 - 154/89)  RR: 18 (08-03-19 @ 14:27) (18 - 18)  SpO2: 97% (08-03-19 @ 14:27) (95% - 97%)  Wt(kg): --Vital Signs Last 24 Hrs  T(C): 36.9 (03 Aug 2019 14:27), Max: 37.1 (03 Aug 2019 04:52)  T(F): 98.4 (03 Aug 2019 14:27), Max: 98.7 (03 Aug 2019 04:52)  HR: 63 (03 Aug 2019 14:27) (55 - 64)  BP: 154/89 (03 Aug 2019 14:27) (116/59 - 154/89)  BP(mean): --  RR: 18 (03 Aug 2019 14:27) (18 - 18)  SpO2: 97% (03 Aug 2019 14:27) (95% - 97%)  I&O's Summary    02 Aug 2019 07:01  -  03 Aug 2019 07:00  --------------------------------------------------------  IN: 1040 mL / OUT: 3850 mL / NET: -2810 mL        PHYSICAL EXAM:  GENERAL: NAD, well-groomed, well-developed  HEAD:  Atraumatic, Normocephalic  NECK: Supple, No JVD, Normal thyroid  NERVOUS SYSTEM:  Alert & Oriented X3, Good concentration  CHEST/LUNG: Clear to percussion bilaterally; No rales, rhonchi, wheezing, or rubs  HEART: Regular rate and rhythm; No murmurs, rubs, or gallops  ABDOMEN: Soft, pos umbilical hernia ( chronic as per pt) no rash , pos tenderness to palpation to the LT and RT lower quadrants with no rebound tenderness or involuntary guarding.   Bowel sounds present  EXTREMITIES:  Left dressing in place / not removed      : patient has jacquse in place ( placed at [facility] as per patient due to urinary retention)       Consultant(s) Notes Reviewed:  [x ] YES  [ ] NO  Care Discussed with Consultants/Other Providers [ x] YES  [ ] NO  Name of Consultant  LABS:                        9.8    7.1   )-----------( 307      ( 03 Aug 2019 04:44 )             29.8     08-03    142  |  106  |  18  ----------------------------<  137<H>  4.0   |  24  |  1.12    Ca    9.5      03 Aug 2019 04:44    TPro  6.7  /  Alb  3.4  /  TBili  0.2  /  DBili  x   /  AST  9<L>  /  ALT  9<L>  /  AlkPhos  125<H>  08-03        CAPILLARY BLOOD GLUCOSE      POCT Blood Glucose.: 207 mg/dL (03 Aug 2019 11:42)  POCT Blood Glucose.: 158 mg/dL (03 Aug 2019 07:19)  POCT Blood Glucose.: 202 mg/dL (02 Aug 2019 20:54)            RADIOLOGY & ADDITIONAL TESTS:  EKG :     Imaging Personally Reviewed:  [ ] YES  [ ] NO  HEALTH ISSUES - PROBLEM Dx:  ACP (advance care planning): ACP (advance care planning)  CAD (coronary artery disease): CAD (coronary artery disease)  Constipation: Constipation  Anemia: Anemia

## 2019-08-03 NOTE — PROGRESS NOTE ADULT - SUBJECTIVE AND OBJECTIVE BOX
[facility] - Bolivar Carbajal, El, Brittany, Zahra, Román Jacobs  Office Number:  [phone]    Patient resting comfortably in bed in NAD.  Laying flat with no respiratory distress.  No complaints of chest pain, dyspnea, palpitations, PND, or orthopnea.  reports abd pain and constipation    ROS: negative unless otherwise mentioned.    Telemetry:  sr/sb    MEDICATIONS  (STANDING):  amiodarone    Tablet 100 milliGRAM(s) Oral <User Schedule>  apixaban 5 milliGRAM(s) Oral two times a day  aspirin enteric coated 81 milliGRAM(s) Oral daily  atorvastatin 80 milliGRAM(s) Oral at bedtime  clopidogrel Tablet 75 milliGRAM(s) Oral daily  dextrose 5%. 1000 milliLiter(s) (50 mL/Hr) IV Continuous <Continuous>  dextrose 50% Injectable 12.5 Gram(s) IV Push once  dextrose 50% Injectable 25 Gram(s) IV Push once  dextrose 50% Injectable 25 Gram(s) IV Push once  docusate sodium 100 milliGRAM(s) Oral three times a day  furosemide    Tablet 40 milliGRAM(s) Oral daily  insulin glargine Injectable (LANTUS) 24 Unit(s) SubCutaneous at bedtime  insulin lispro (HumaLOG) corrective regimen sliding scale   SubCutaneous three times a day before meals  insulin lispro (HumaLOG) corrective regimen sliding scale   SubCutaneous at bedtime  magnesium citrate Oral Solution 1 Bottle Oral once  pantoprazole    Tablet 40 milliGRAM(s) Oral two times a day  polyethylene glycol 3350 17 Gram(s) Oral two times a day  potassium chloride    Tablet ER 20 milliEquivalent(s) Oral daily  senna 2 Tablet(s) Oral at bedtime  sucralfate 1 Gram(s) Oral four times a day  tamsulosin 0.4 milliGRAM(s) Oral at bedtime    MEDICATIONS  (PRN):  dextrose 40% Gel 15 Gram(s) Oral once PRN Blood Glucose LESS THAN 70 milliGRAM(s)/deciliter  glucagon  Injectable 1 milliGRAM(s) IntraMuscular once PRN Glucose LESS THAN 70 milligrams/deciliter      Allergies    fish (Hives)  No Known Drug Allergies    Intolerances        Vital Signs Last 24 Hrs  T(C): 37.1 (03 Aug 2019 04:52), Max: 37.1 (03 Aug 2019 04:52)  T(F): 98.7 (03 Aug 2019 04:52), Max: 98.7 (03 Aug 2019 04:52)  HR: 59 (03 Aug 2019 04:52) (47 - 65)  BP: 116/59 (03 Aug 2019 04:52) (99/59 - 138/80)  BP(mean): --  RR: 18 (03 Aug 2019 04:52) (18 - 18)  SpO2: 95% (03 Aug 2019 04:52) (92% - 98%)    I&O's Summary    01 Aug 2019 07:01  -  02 Aug 2019 07:00  --------------------------------------------------------  IN: 420 mL / OUT: 2700 mL / NET: -2280 mL    02 Aug 2019 07:01  -  03 Aug 2019 06:50  --------------------------------------------------------  IN: 1040 mL / OUT: 3850 mL / NET: -2810 mL        ON EXAM:    Constitutional: NAD, alert and oriented x 3  Lungs:  Non-labored, breath sounds are clear bilaterally, No wheezing, rales or rhonchi  Cardiovascular: braydcardic.  S1 and S2 positive.  No murmurs, rubs, gallops or clicks  Gastrointestinal: Bowel Sounds present, soft, nontender.   Lymph: No peripheral edema. No cervical lymphadenopathy.  Neurological: Alert, no focal deficits  Skin: No rashes or ulcers   Psych:  Mood & affect appropriate.    LABS: All Labs Reviewed:                        9.8    7.1   )-----------( 307      ( 03 Aug 2019 04:44 )             29.8                         10.1   8.8   )-----------( 350      ( 02 Aug 2019 00:38 )             32.2                         9.5    7.67  )-----------( 324      ( 01 Aug 2019 06:09 )             31.0     03 Aug 2019 04:44    142    |  106    |  18     ----------------------------<  137    4.0     |  24     |  1.12   02 Aug 2019 00:38    138    |  102    |  14     ----------------------------<  166    3.9     |  24     |  1.14   01 Aug 2019 06:09    143    |  109    |  13     ----------------------------<  159    4.2     |  30     |  0.95     Ca    9.5        03 Aug 2019 04:44  Ca    9.6        02 Aug 2019 00:38  Ca    9.0        01 Aug 2019 06:09    TPro  6.7    /  Alb  3.4    /  TBili  0.2    /  DBili  x      /  AST  9      /  ALT  9      /  AlkPhos  125    03 Aug 2019 04:44          Blood Culture:

## 2019-08-03 NOTE — DISCHARGE NOTE PROVIDER - NSDCCPTREATMENT_GEN_ALL_CORE_FT
PRINCIPAL PROCEDURE  Procedure: Placement of coronary artery stent  Findings and Treatment: one mid RCA stent PRINCIPAL PROCEDURE  Procedure: Placement of coronary artery stent  Findings and Treatment: one mid RCA stent (bare metal stent)

## 2019-08-03 NOTE — DISCHARGE NOTE PROVIDER - HOSPITAL COURSE
HPI:    49M with PMH of PAF on eliquis, RUL subsegmental PE, hx of recent NSTEMI, hx of perforated antral ulcer, osteomyelitis s/p debridement,  HTN and DM2 presenting to the ED [place] on 7/31/19 complaining of chest pain. Chest pain started last night when climbing a flight of stairs. Pain described as sharp, burning pain, originating on left side and spreads across chest. At one point last night it traveled up to his left jaw and down his left arm. At onset, the pain was 10/10. Pain was associated with diaphoresis and SOB. Not exacerbated by activity, position. Patient had a friend take him to the [place] ED after symptoms did not dissipate. Had some SOB when he first arrived to the ED last night, now resolved. No N/V, palpitations, LE edema.    Of note, patient recently admitted to [place] for perforated antral ulcer with emergent ex-lap omentopexy and plication in 5/2019. Post op developed chest pain and found to have new onset Afib, elevated troponin level, diagnosed with NSTEMI. Started on amiodarone, converted to SR. Patient was to follow up outpatient for ischemic eval, but has been at rehab for past 6 weeks and has not had the opportunity to do so. In June, patient returned with chest pain, similar to what he is feeling now. Found to have RUL subsegmental PE. Treated with heparin drip and transitioned to eliquis. Pt was seen by GI Dr Hannah Lentz and cleared the patient for AC. Pt returned back to the ED again 7/28/19 for abdominal pain, diagnosed with constipation. Just discharged from [facility] rehab.        Patient was admitted to [facility] in [place] for unstable angina. Cardiology saw the patient in the ED. They noted no clear evidence of acute ischemia, volume overload, or EKG changes. Due to recent history of NSTEMI, extensive smoking history, and presentation with atypical chest pain, it was deemed appropriate to undergo cardiac cath. (01 Aug 2019 12:23)        8/2 cardiac cath with one stent to the mid RCA. Right radial site without swelling, bleeding. HPI:    49M with PMH of PAF on eliquis, RUL subsegmental PE, hx of recent NSTEMI, hx of perforated antral ulcer, osteomyelitis s/p debridement,  HTN and DM2 presenting to the ED [place] on 7/31/19 complaining of chest pain. Chest pain started last night when climbing a flight of stairs. Pain described as sharp, burning pain, originating on left side and spreads across chest. At one point last night it traveled up to his left jaw and down his left arm. At onset, the pain was 10/10. Pain was associated with diaphoresis and SOB. Not exacerbated by activity, position. Patient had a friend take him to the [place] ED after symptoms did not dissipate. Had some SOB when he first arrived to the ED last night, now resolved. No N/V, palpitations, LE edema.    Of note, patient recently admitted to [place] for perforated antral ulcer with emergent ex-lap omentopexy and plication in 5/2019. Post op developed chest pain and found to have new onset Afib, elevated troponin level, diagnosed with NSTEMI. Started on amiodarone, converted to SR. Patient was to follow up outpatient for ischemic eval, but has been at rehab for past 6 weeks and has not had the opportunity to do so. In June, patient returned with chest pain, similar to what he is feeling now. Found to have RUL subsegmental PE. Treated with heparin drip and transitioned to eliquis. Pt was seen by GI Dr Hannah Lentz and cleared the patient for AC. Pt returned back to the ED again 7/28/19 for abdominal pain, diagnosed with constipation. Just discharged from [facility] rehab.        Patient was admitted to [facility] in [place] for unstable angina. Cardiology saw the patient in the ED. They noted no clear evidence of acute ischemia, volume overload, or EKG changes. Due to recent history of NSTEMI, extensive smoking history, and presentation with atypical chest pain, it was deemed appropriate to undergo cardiac cath. (01 Aug 2019 12:23)        8/2 cardiac cath with one bare metal stent to the mid RCA due to GI bleed. Right radial site without swelling, bleeding. Patient will be on Plavix and ASA together with Eliquis for 2weeks and continue with Eliquis there after, will follow up with dr France. 49M with PMH of PAF on eliquis, RUL subsegmental PE, hx of recent NSTEMI, hx of perforated antral ulcer, osteomyelitis s/p debridement,  HTN and DM2 presenting to the ED [place] on 7/31/19 complaining of chest pain. Patient had a friend take him to the [place] ED after symptoms did not dissipate. Had some SOB when he first arrived to the ED.    Patient was admitted to [facility] in [place] for unstable angina. Cardiology saw the patient in the ED. They noted no clear evidence of acute ischemia, volume overload, or EKG changes. Due to recent history of NSTEMI, extensive smoking history, and presentation with atypical chest pain, it was deemed appropriate to undergo cardiac cath.     8/2 cardiac cath with one bare metal stent to the mid RCA due to GI bleed. Right radial site without swelling, bleeding. Patient will be on Plavix and ASA together wth Eliquis for 2weeks and continue with Eliquis there after, will follow up with Dr France. Hospital course complicated by c/o constipation and blood in stool in the setting of triple therapy. h/H remained stable, with no blood in stool noted. Optimized on bowel regimen with considerable improvement of symptoms. Also developed urinary retention-->jacques catheter placed x 48 hours, failed TOV, and jacques was placed again, with plans to f/u with Urology outpatient for TOV next week. Pt deemed clinically stable for discharge home with follow-ups as advised within one week. 49M with PMH of PAF on eliquis, RUL subsegmental PE, hx of recent NSTEMI, hx of perforated antral ulcer, osteomyelitis s/p debridement,  HTN and DM2 presenting to the ED [place] on 7/31/19 complaining of chest pain. Patient had a friend take him to the [place] ED after symptoms did not dissipate. Had some SOB when he first arrived to the ED.    Patient was admitted to tele in [place] for unstable angina. Cardiology saw the patient in the ED. They noted no clear evidence of acute ischemia, volume overload, or EKG changes. Due to recent history of NSTEMI, extensive smoking history, and presentation with atypical chest pain, it was deemed appropriate to undergo cardiac cath.     8/2 cardiac cath with one bare metal stent to the mid RCA due to GI bleed. Right radial site without swelling, bleeding. Patient will be on Plavix and ASA together with Eliquis for 1 week (until 8/10/19)and then discontinue Aspirin only. Continue Plavix with Eliquis for the next 3 weeks and to follow up cardiologist in 1 week with Dr. France/Dr. Gallegos. Hospital course complicated by c/o constipation and blood in stool in the setting of triple therapy. h/H remained stable, with no blood in stool noted. Optimized on bowel regimen with considerable improvement of symptoms. Also developed urinary retention-->jacques catheter placed x 48 hours, failed TOV, and jacques was placed again, with plans to f/u with Urology outpatient for TOV next week. Pt deemed clinically stable for discharge home with follow-ups as advised within one week. 49M with PMH of PAF on eliquis, RUL subsegmental PE, hx of recent NSTEMI, hx of perforated antral ulcer, osteomyelitis s/p debridement,  HTN and DM2 presenting to the ED [place] on 7/31/19 complaining of chest pain. Patient had a friend take him to the [place] ED after symptoms did not dissipate. Had some SOB when he first arrived to the ED.    Patient was admitted to tele in [place] for unstable angina. Cardiology saw the patient in the ED. They noted no clear evidence of acute ischemia, volume overload, or EKG changes. Due to recent history of NSTEMI, extensive smoking history, and presentation with atypical chest pain, it was deemed appropriate to undergo cardiac cath.     8/2 cardiac cath with one bare metal stent to the mid RCA due to GI bleed. Right radial site without swelling, bleeding. Patient will be on Plavix and Eliquis together-as directed by Cardiologist. Patient with anginal pain as well, added Imdur as per Cards and patient to follow up with cardiologist in 1 week Dr. France/Dr. Gallegos. Hospital course complicated by c/o constipation and blood in stool in the setting of triple therapy. h/H remained stable, with no blood in stool noted. Optimized on bowel regimen with considerable improvement of symptoms. Also developed urinary retention-->jacques catheter placed x 48 hours, failed TOV, and jacques was placed again, with plans to f/u with Urology outpatient for TOV next week. Pt deemed clinically stable for discharge home with follow-ups as advised within one week.

## 2019-08-03 NOTE — DISCHARGE NOTE PROVIDER - CARE PROVIDER_API CALL
Ed Gallegos)  Cardiovascular Disease  [address] [place] [address]  Phone: [phone]  Fax: [phone]  Follow Up Time: Ed Gallegos)  Cardiovascular Disease  [address] [place] [address]  Phone: [phone]  Fax: [phone]  Follow Up Time:     Jeovany France)  Internal Medicine  [address] [place] [id number]  Phone: [phone]  Fax: [phone]  Follow Up Time:

## 2019-08-03 NOTE — DISCHARGE NOTE PROVIDER - NSDCACTIVITY_GEN_ALL_CORE
Showering allowed/Stairs allowed/Walking - Indoors allowed/Walking - Outdoors allowed/No heavy lifting/straining Walking - Outdoors allowed/Do not drive or operate machinery/Walking - Indoors allowed/Showering allowed/No heavy lifting/straining/Stairs allowed

## 2019-08-03 NOTE — DISCHARGE NOTE PROVIDER - NSDCCPCAREPLAN_GEN_ALL_CORE_FT
PRINCIPAL DISCHARGE DIAGNOSIS  Diagnosis: CAD (coronary artery disease), native coronary artery  Assessment and Plan of Treatment: Do not stop your aspirin or Plavix unless instructed to do so by your cardiologist, they help keep your stented arteries open.   No heavy lifting or pushing/pulling with procedure arm for 2 weeks. No driving for 2 days. You may shower 24 hours following the procedure but avoid baths/swimming for 1 week. Check your wrist site for bleeding and/or swelling daily following procedure and call your doctor immediately if it occurs or if you experience increased pain at the site. Follow up with your cardiologist in 1-2 weeks. You may call [facility] if you have any questions/concerns regarding your procedure [phone].      SECONDARY DISCHARGE DIAGNOSES  Diagnosis: DM type 2, goal HbA1c < 7%  Assessment and Plan of Treatment: Your Hemoglobin A1c level is   Continue to follow with your primary care MD or your endocrinologist.  Follow a heart healthy diabetic diet. If you check your fingerstick glucose at home, call your MD if it is greater than 250mg/dL on 2 occasions or less than 100mg/dL on 2 occasions. Know signs of low blood sugar, such as: dizziness, shakiness, sweating, confusion, hunger, nervousness-drink 4 ounces apple juice if occurs and call your doctor. Know early signs of high blood sugar, such as: frequent urination, increased thirst, blurry vision, fatigue, headache - call your doctor if this occurs. Follow with other practitioners to care for your diabetes, such as ophthalmologist and podiatrist.    Diagnosis: HTN (hypertension)  Assessment and Plan of Treatment: Continue with your blood pressure medications; eat a heart healthy diet with low salt diet; exercise regularly (consult with your physician or cardiologist first); maintain a heart healthy weight; if you smoke - quit (A resource to help you stop smoking is the [facility] Tobacco [facility] – phone number [phone].); include healthy ways to manage stress. Continue to follow with your primary care physician or cardiologist.    Diagnosis: HLD (hyperlipidemia)  Assessment and Plan of Treatment: Continue with your cholesterol medications. Eat a heart healthy diet that is low in saturated fats and salt, and includes whole grains, fruits, vegetables and lean protein; exercise regularly (consult with your physician or cardiologist first); maintain a heart healthy weight; if you smoke - quit (A resource to help you stop smoking is the [facility] for Tobacco Control – phone number [phone].). Continue to follow with your primary physician or cardiologist. PRINCIPAL DISCHARGE DIAGNOSIS  Diagnosis: CAD (coronary artery disease), native coronary artery  Assessment and Plan of Treatment: Do not stop your aspirin or Plavix unless instructed to do so by your cardiologist, they help keep your stented arteries open.   No heavy lifting or pushing/pulling with procedure arm for 2 weeks. No driving for 2 days. You may shower 24 hours following the procedure but avoid baths/swimming for 1 week. Check your wrist site for bleeding and/or swelling daily following procedure and call your doctor immediately if it occurs or if you experience increased pain at the site. Follow up with your cardiologist in 1-2 weeks. You may call [facility] if you have any questions/concerns regarding your procedure [phone].      SECONDARY DISCHARGE DIAGNOSES  Diagnosis: Constipation  Assessment and Plan of Treatment: Continue bowel regimen. Cleared by GI. Follow-up outpatient for further management    Diagnosis: Urinary retention  Assessment and Plan of Treatment: failed trial of void, has jacques catheter in place  Follow-up at [facility] next week for trial of void  Continue Flomax 0.4 and continue with bowel regimen    Diagnosis: DM type 2, goal HbA1c < 7%  Assessment and Plan of Treatment: Your Hemoglobin A1c level is   Continue to follow with your primary care MD or your endocrinologist.  Follow a heart healthy diabetic diet. If you check your fingerstick glucose at home, call your MD if it is greater than 250mg/dL on 2 occasions or less than 100mg/dL on 2 occasions. Know signs of low blood sugar, such as: dizziness, shakiness, sweating, confusion, hunger, nervousness-drink 4 ounces apple juice if occurs and call your doctor. Know early signs of high blood sugar, such as: frequent urination, increased thirst, blurry vision, fatigue, headache - call your doctor if this occurs. Follow with other practitioners to care for your diabetes, such as ophthalmologist and podiatrist.    Diagnosis: HTN (hypertension)  Assessment and Plan of Treatment: Continue with your blood pressure medications; eat a heart healthy diet with low salt diet; exercise regularly (consult with your physician or cardiologist first); maintain a heart healthy weight; if you smoke - quit (A resource to help you stop smoking is the [facility] Tobacco [facility] – phone number [phone].); include healthy ways to manage stress. Continue to follow with your primary care physician or cardiologist.    Diagnosis: HLD (hyperlipidemia)  Assessment and Plan of Treatment: Continue with your cholesterol medications. Eat a heart healthy diet that is low in saturated fats and salt, and includes whole grains, fruits, vegetables and lean protein; exercise regularly (consult with your physician or cardiologist first); maintain a heart healthy weight; if you smoke - quit (A resource to help you stop smoking is the [facility] for Tobacco Control – phone number [phone].). Continue to follow with your primary physician or cardiologist. PRINCIPAL DISCHARGE DIAGNOSIS  Diagnosis: CAD (coronary artery disease), native coronary artery  Assessment and Plan of Treatment: Plavix and ASA together with Eliquis for 1 week (until 8/10/19)and then discontinue Aspirin only. Continue Plavix with Eliquis for the next 3 weeks and to follow up cardiologist in 1 week with Dr. France/Dr. Gallegos. These medication help keep your stented arteries open.   No heavy lifting or pushing/pulling with procedure arm for 2 weeks. No driving for 2 days. You may shower 24 hours following the procedure but avoid baths/swimming for 1 week. Check your wrist site for bleeding and/or swelling daily following procedure and call your doctor immediately if it occurs or if you experience increased pain at the site. Follow up with your cardiologist in 1-2 weeks. You may call [facility] if you have any questions/concerns regarding your procedure [phone].      SECONDARY DISCHARGE DIAGNOSES  Diagnosis: Constipation  Assessment and Plan of Treatment: Continue bowel regimen. Cleared by GI. Follow-up outpatient for further management    Diagnosis: Urinary retention  Assessment and Plan of Treatment: failed trial of void, has jacques catheter in place  Follow-up at [facility] next week for trial of void  Continue Flomax 0.4 and continue with bowel regimen    Diagnosis: DM type 2, goal HbA1c < 7%  Assessment and Plan of Treatment: Your Hemoglobin A1c level is   Continue to follow with your primary care MD or your endocrinologist.  Follow a heart healthy diabetic diet. If you check your fingerstick glucose at home, call your MD if it is greater than 250mg/dL on 2 occasions or less than 100mg/dL on 2 occasions. Know signs of low blood sugar, such as: dizziness, shakiness, sweating, confusion, hunger, nervousness-drink 4 ounces apple juice if occurs and call your doctor. Know early signs of high blood sugar, such as: frequent urination, increased thirst, blurry vision, fatigue, headache - call your doctor if this occurs. Follow with other practitioners to care for your diabetes, such as ophthalmologist and podiatrist.    Diagnosis: HTN (hypertension)  Assessment and Plan of Treatment: Continue with your blood pressure medications; eat a heart healthy diet with low salt diet; exercise regularly (consult with your physician or cardiologist first); maintain a heart healthy weight; if you smoke - quit (A resource to help you stop smoking is the [facility] for Tobacco Control – phone number [phone].); include healthy ways to manage stress. Continue to follow with your primary care physician or cardiologist.    Diagnosis: HLD (hyperlipidemia)  Assessment and Plan of Treatment: Continue with your cholesterol medications. Eat a heart healthy diet that is low in saturated fats and salt, and includes whole grains, fruits, vegetables and lean protein; exercise regularly (consult with your physician or cardiologist first); maintain a heart healthy weight; if you smoke - quit (A resource to help you stop smoking is the [facility] Tobacco [facility] – phone number [phone].). Continue to follow with your primary physician or cardiologist. PRINCIPAL DISCHARGE DIAGNOSIS  Diagnosis: CAD (coronary artery disease), native coronary artery  Assessment and Plan of Treatment: Plavix and Eliquis togther. Imdur added for anginal symptoms.  Follow up cardiologist in 1 week with Dr. France/Dr. Gallegos. These medication help keep your stented arteries open.   No heavy lifting or pushing/pulling with procedure arm for 2 weeks. No driving for 2 days. You may shower 24 hours following the procedure but avoid baths/swimming for 1 week. Check your wrist site for bleeding and/or swelling daily following procedure and call your doctor immediately if it occurs or if you experience increased pain at the site. Follow up with your cardiologist in 1-2 weeks. You may call [facility] if you have any questions/concerns regarding your procedure [phone].      SECONDARY DISCHARGE DIAGNOSES  Diagnosis: Constipation  Assessment and Plan of Treatment: Continue bowel regimen. Cleared by GI. Follow-up outpatient for further management    Diagnosis: Urinary retention  Assessment and Plan of Treatment: failed trial of void, has jacques catheter in place  Follow-up at [facility] next week for trial of void  Continue Flomax 0.4 and continue with bowel regimen    Diagnosis: DM type 2, goal HbA1c < 7%  Assessment and Plan of Treatment: Your Hemoglobin A1c level is   Continue to follow with your primary care MD or your endocrinologist.  Follow a heart healthy diabetic diet. If you check your fingerstick glucose at home, call your MD if it is greater than 250mg/dL on 2 occasions or less than 100mg/dL on 2 occasions. Know signs of low blood sugar, such as: dizziness, shakiness, sweating, confusion, hunger, nervousness-drink 4 ounces apple juice if occurs and call your doctor. Know early signs of high blood sugar, such as: frequent urination, increased thirst, blurry vision, fatigue, headache - call your doctor if this occurs. Follow with other practitioners to care for your diabetes, such as ophthalmologist and podiatrist.    Diagnosis: HTN (hypertension)  Assessment and Plan of Treatment: Continue with your blood pressure medications; eat a heart healthy diet with low salt diet; exercise regularly (consult with your physician or cardiologist first); maintain a heart healthy weight; if you smoke - quit (A resource to help you stop smoking is the [facility] for Tobacco [facility] – phone number [phone].); include healthy ways to manage stress. Continue to follow with your primary care physician or cardiologist.    Diagnosis: HLD (hyperlipidemia)  Assessment and Plan of Treatment: Continue with your cholesterol medications. Eat a heart healthy diet that is low in saturated fats and salt, and includes whole grains, fruits, vegetables and lean protein; exercise regularly (consult with your physician or cardiologist first); maintain a heart healthy weight; if you smoke - quit (A resource to help you stop smoking is the [facility] for Tobacco Control – phone number [phone].). Continue to follow with your primary physician or cardiologist.

## 2019-08-03 NOTE — PROGRESS NOTE ADULT - SUBJECTIVE AND OBJECTIVE BOX
GASTROENTEROLOGY    Patient seen and examined at bedside. Had two bowel movements yesterday, one of them he reports was bloody  He denies nausea/vomiting  Tolerating diet    MEDICATIONS  (STANDING):  amiodarone    Tablet 100 milliGRAM(s) Oral <User Schedule>  apixaban 5 milliGRAM(s) Oral two times a day  aspirin enteric coated 81 milliGRAM(s) Oral daily  atorvastatin 80 milliGRAM(s) Oral at bedtime  clopidogrel Tablet 75 milliGRAM(s) Oral daily  dextrose 5%. 1000 milliLiter(s) (50 mL/Hr) IV Continuous <Continuous>  dextrose 50% Injectable 12.5 Gram(s) IV Push once  dextrose 50% Injectable 25 Gram(s) IV Push once  dextrose 50% Injectable 25 Gram(s) IV Push once  docusate sodium 100 milliGRAM(s) Oral three times a day  furosemide    Tablet 40 milliGRAM(s) Oral daily  insulin glargine Injectable (LANTUS) 24 Unit(s) SubCutaneous at bedtime  insulin lispro (HumaLOG) corrective regimen sliding scale   SubCutaneous three times a day before meals  insulin lispro (HumaLOG) corrective regimen sliding scale   SubCutaneous at bedtime  magnesium citrate Oral Solution 1 Bottle Oral once  pantoprazole    Tablet 40 milliGRAM(s) Oral two times a day  polyethylene glycol 3350 17 Gram(s) Oral two times a day  potassium chloride    Tablet ER 20 milliEquivalent(s) Oral daily  senna 2 Tablet(s) Oral at bedtime  sucralfate 1 Gram(s) Oral four times a day  tamsulosin 0.4 milliGRAM(s) Oral at bedtime        T(F): 98.7 (08-03-19 @ 04:52), Max: 98.7 (08-03-19 @ 04:52)  HR: 59 (08-03-19 @ 04:52) (47 - 64)  BP: 116/59 (08-03-19 @ 04:52) (103/63 - 138/80)  RR: 18 (08-03-19 @ 04:52) (18 - 18)  SpO2: 95% (08-03-19 @ 04:52) (95% - 97%)  Wt(kg): --    PHYSICAL EXAM  GENERAL:   NAD  HEENT:  NC/AT, no JVD, sclera-anicteric  CHEST:  clear to ascultation bilaterally, respirations nonlabored  HEART:  +S1+S2 regular rate and rhythm   ABDOMEN:  Soft, non-tender, non-distended, normoactive bowel sounds, no masses  EXTREMITIES:  no cyanosis, clubbing or edema  NEURO:  Alert, oriented  SKIN:  No rash/warm/dry      LABS:                        9.8<L>  7.1   )-----------( 307      ( 03 Aug 2019 04:44 )             29.8<L>  03 Aug 2019 04:44    08-03    142  |  106  |  18  ----------------------------<  137<H>  4.0   |  24  |  1.12    Ca    9.5      03 Aug 2019 04:44    TPro  6.7  /  Alb  3.4  /  TBili  0.2  /  DBili  x   /  AST  9<L>  /  ALT  9<L>  /  AlkPhos  125<H>  08-03    LIVER FUNCTIONS - ( 03 Aug 2019 04:44 )  Alb: 3.4 g/dL / Pro: 6.7 g/dL / ALK PHOS: 125 U/L / ALT: 9 U/L / AST: 9 U/L / GGT: x             I&O's Detail    02 Aug 2019 07:01  -  03 Aug 2019 07:00  --------------------------------------------------------  IN:    Oral Fluid: 1040 mL  Total IN: 1040 mL    OUT:    Indwelling Catheter - Urethral: 3850 mL  Total OUT: 3850 mL    Total NET: -2810 mL

## 2019-08-03 NOTE — PROGRESS NOTE ADULT - ASSESSMENT
Mr. Kenney is a 49 M with PAF on eliquis, RUL subsegmental PE, hx of recent NSTEMI, hx of perforated antral ulcer, osteomyelitis s/p debridement,  HTN and DM2 presenting to [place] on 7/31/19 complaining of chest pain.   He is now s/p cath with mild LAD disease, 90% RCA and 100% RPL. He is now s/p BMS to RCA    - He is chest pain free this morning, though does report a bloody BM, which he has occasionally. He reports significant constipation  - He will be on triple therapy for 2 weeks with ASA, Plavix and Eliquis.  - Sinus bradycardia on telemetry, though no additional AF. Continue with Amiodarone with hold parameters  - BB has been held in the setting of bradycardia  - continue with statin  - Euvolemic on exam. Continue with Po lasix.  - GI follow up for constipation and abdominal pain.  - Watch creatinine and electrolytes. Keep K>4, Mg>2  - No cardiac contraindication to d/c home. He would like GI to see him given his constipation and abdominal pain prior to discharge  - The importance of strict follow up has been stressed.

## 2019-08-03 NOTE — PROGRESS NOTE ADULT - ASSESSMENT
49M with PMH of PAF on eliquis, RUL subsegmental PE, hx of recent NSTEMI, hx of perforated antral ulcer (S/P  emergent ex-lap omentopexy and plication in 5/2019) , osteomyelitis s/p debridement,  HTN and DM2 presented to the ED at [place] on 7/31/19 complaining of chest pain.  On 8/1/19, patient had diagnostic  LHC revealing, RCA 90%; %.  He is now s/p BMS to RCA.   Patient was seen by GI and was cleared for Eliquis/Plavix and ASA.  NOw with lower abdominal pain for many weeks of unclear etiology.

## 2019-08-04 DIAGNOSIS — D64.9 ANEMIA, UNSPECIFIED: ICD-10-CM

## 2019-08-04 LAB
ALBUMIN SERPL ELPH-MCNC: 3.5 G/DL — SIGNIFICANT CHANGE UP (ref 3.3–5)
ALP SERPL-CCNC: 129 U/L — HIGH (ref 40–120)
ALT FLD-CCNC: 10 U/L — SIGNIFICANT CHANGE UP (ref 10–45)
ANION GAP SERPL CALC-SCNC: 13 MMOL/L — SIGNIFICANT CHANGE UP (ref 5–17)
AST SERPL-CCNC: 11 U/L — SIGNIFICANT CHANGE UP (ref 10–40)
BASOPHILS # BLD AUTO: 0.1 K/UL — SIGNIFICANT CHANGE UP (ref 0–0.2)
BASOPHILS NFR BLD AUTO: 0.6 % — SIGNIFICANT CHANGE UP (ref 0–2)
BILIRUB SERPL-MCNC: 0.2 MG/DL — SIGNIFICANT CHANGE UP (ref 0.2–1.2)
BLD GP AB SCN SERPL QL: NEGATIVE — SIGNIFICANT CHANGE UP
BUN SERPL-MCNC: 17 MG/DL — SIGNIFICANT CHANGE UP (ref 7–23)
CALCIUM SERPL-MCNC: 9.5 MG/DL — SIGNIFICANT CHANGE UP (ref 8.4–10.5)
CHLORIDE SERPL-SCNC: 100 MMOL/L — SIGNIFICANT CHANGE UP (ref 96–108)
CO2 SERPL-SCNC: 24 MMOL/L — SIGNIFICANT CHANGE UP (ref 22–31)
CREAT SERPL-MCNC: 1.2 MG/DL — SIGNIFICANT CHANGE UP (ref 0.5–1.3)
EOSINOPHIL # BLD AUTO: 0.4 K/UL — SIGNIFICANT CHANGE UP (ref 0–0.5)
EOSINOPHIL NFR BLD AUTO: 4.4 % — SIGNIFICANT CHANGE UP (ref 0–6)
GLUCOSE BLDC GLUCOMTR-MCNC: 167 MG/DL — HIGH (ref 70–99)
GLUCOSE BLDC GLUCOMTR-MCNC: 191 MG/DL — HIGH (ref 70–99)
GLUCOSE BLDC GLUCOMTR-MCNC: 217 MG/DL — HIGH (ref 70–99)
GLUCOSE BLDC GLUCOMTR-MCNC: 254 MG/DL — HIGH (ref 70–99)
GLUCOSE SERPL-MCNC: 137 MG/DL — HIGH (ref 70–99)
HCT VFR BLD CALC: 29.9 % — LOW (ref 39–50)
HCT VFR BLD CALC: 30.4 % — LOW (ref 39–50)
HGB BLD-MCNC: 9.6 G/DL — LOW (ref 13–17)
HGB BLD-MCNC: 9.7 G/DL — LOW (ref 13–17)
LYMPHOCYTES # BLD AUTO: 1.8 K/UL — SIGNIFICANT CHANGE UP (ref 1–3.3)
LYMPHOCYTES # BLD AUTO: 20.6 % — SIGNIFICANT CHANGE UP (ref 13–44)
MCHC RBC-ENTMCNC: 28.6 PG — SIGNIFICANT CHANGE UP (ref 27–34)
MCHC RBC-ENTMCNC: 28.7 PG — SIGNIFICANT CHANGE UP (ref 27–34)
MCHC RBC-ENTMCNC: 31.9 GM/DL — LOW (ref 32–36)
MCHC RBC-ENTMCNC: 32.1 GM/DL — SIGNIFICANT CHANGE UP (ref 32–36)
MCV RBC AUTO: 89.5 FL — SIGNIFICANT CHANGE UP (ref 80–100)
MCV RBC AUTO: 89.6 FL — SIGNIFICANT CHANGE UP (ref 80–100)
MONOCYTES # BLD AUTO: 1.1 K/UL — HIGH (ref 0–0.9)
MONOCYTES NFR BLD AUTO: 13 % — SIGNIFICANT CHANGE UP (ref 2–14)
NEUTROPHILS # BLD AUTO: 5.3 K/UL — SIGNIFICANT CHANGE UP (ref 1.8–7.4)
NEUTROPHILS NFR BLD AUTO: 61.4 % — SIGNIFICANT CHANGE UP (ref 43–77)
PLATELET # BLD AUTO: 310 K/UL — SIGNIFICANT CHANGE UP (ref 150–400)
PLATELET # BLD AUTO: 321 K/UL — SIGNIFICANT CHANGE UP (ref 150–400)
POTASSIUM SERPL-MCNC: 3.9 MMOL/L — SIGNIFICANT CHANGE UP (ref 3.5–5.3)
POTASSIUM SERPL-SCNC: 3.9 MMOL/L — SIGNIFICANT CHANGE UP (ref 3.5–5.3)
PROT SERPL-MCNC: 7.2 G/DL — SIGNIFICANT CHANGE UP (ref 6–8.3)
RBC # BLD: 3.34 M/UL — LOW (ref 4.2–5.8)
RBC # BLD: 3.39 M/UL — LOW (ref 4.2–5.8)
RBC # FLD: 12.9 % — SIGNIFICANT CHANGE UP (ref 10.3–14.5)
RBC # FLD: 13.1 % — SIGNIFICANT CHANGE UP (ref 10.3–14.5)
RH IG SCN BLD-IMP: NEGATIVE — SIGNIFICANT CHANGE UP
SODIUM SERPL-SCNC: 137 MMOL/L — SIGNIFICANT CHANGE UP (ref 135–145)
WBC # BLD: 8.3 K/UL — SIGNIFICANT CHANGE UP (ref 3.8–10.5)
WBC # BLD: 8.6 K/UL — SIGNIFICANT CHANGE UP (ref 3.8–10.5)
WBC # FLD AUTO: 8.3 K/UL — SIGNIFICANT CHANGE UP (ref 3.8–10.5)
WBC # FLD AUTO: 8.6 K/UL — SIGNIFICANT CHANGE UP (ref 3.8–10.5)

## 2019-08-04 PROCEDURE — 99232 SBSQ HOSP IP/OBS MODERATE 35: CPT

## 2019-08-04 PROCEDURE — 99233 SBSQ HOSP IP/OBS HIGH 50: CPT

## 2019-08-04 RX ORDER — SIMETHICONE 80 MG/1
80 TABLET, CHEWABLE ORAL
Refills: 0 | Status: DISCONTINUED | OUTPATIENT
Start: 2019-08-04 | End: 2019-08-09

## 2019-08-04 RX ADMIN — SENNA PLUS 2 TABLET(S): 8.6 TABLET ORAL at 21:24

## 2019-08-04 RX ADMIN — POLYETHYLENE GLYCOL 3350 17 GRAM(S): 17 POWDER, FOR SOLUTION ORAL at 17:15

## 2019-08-04 RX ADMIN — INSULIN GLARGINE 24 UNIT(S): 100 INJECTION, SOLUTION SUBCUTANEOUS at 21:32

## 2019-08-04 RX ADMIN — Medication 100 MILLIGRAM(S): at 13:09

## 2019-08-04 RX ADMIN — Medication 2: at 13:07

## 2019-08-04 RX ADMIN — APIXABAN 5 MILLIGRAM(S): 2.5 TABLET, FILM COATED ORAL at 21:24

## 2019-08-04 RX ADMIN — AMIODARONE HYDROCHLORIDE 100 MILLIGRAM(S): 400 TABLET ORAL at 11:36

## 2019-08-04 RX ADMIN — APIXABAN 5 MILLIGRAM(S): 2.5 TABLET, FILM COATED ORAL at 11:35

## 2019-08-04 RX ADMIN — PANTOPRAZOLE SODIUM 40 MILLIGRAM(S): 20 TABLET, DELAYED RELEASE ORAL at 17:15

## 2019-08-04 RX ADMIN — TRAMADOL HYDROCHLORIDE 25 MILLIGRAM(S): 50 TABLET ORAL at 21:24

## 2019-08-04 RX ADMIN — Medication 1 GRAM(S): at 05:59

## 2019-08-04 RX ADMIN — CLOPIDOGREL BISULFATE 75 MILLIGRAM(S): 75 TABLET, FILM COATED ORAL at 11:36

## 2019-08-04 RX ADMIN — Medication 1: at 21:32

## 2019-08-04 RX ADMIN — Medication 40 MILLIGRAM(S): at 05:59

## 2019-08-04 RX ADMIN — SIMETHICONE 80 MILLIGRAM(S): 80 TABLET, CHEWABLE ORAL at 19:52

## 2019-08-04 RX ADMIN — Medication 20 MILLIEQUIVALENT(S): at 11:36

## 2019-08-04 RX ADMIN — Medication 100 MILLIGRAM(S): at 05:59

## 2019-08-04 RX ADMIN — TAMSULOSIN HYDROCHLORIDE 0.4 MILLIGRAM(S): 0.4 CAPSULE ORAL at 21:24

## 2019-08-04 RX ADMIN — Medication 1 GRAM(S): at 00:09

## 2019-08-04 RX ADMIN — Medication 1: at 18:01

## 2019-08-04 RX ADMIN — Medication 1 GRAM(S): at 11:36

## 2019-08-04 RX ADMIN — POLYETHYLENE GLYCOL 3350 17 GRAM(S): 17 POWDER, FOR SOLUTION ORAL at 05:59

## 2019-08-04 RX ADMIN — PANTOPRAZOLE SODIUM 40 MILLIGRAM(S): 20 TABLET, DELAYED RELEASE ORAL at 05:59

## 2019-08-04 RX ADMIN — Medication 100 MILLIGRAM(S): at 21:24

## 2019-08-04 RX ADMIN — Medication 1: at 09:55

## 2019-08-04 RX ADMIN — Medication 81 MILLIGRAM(S): at 11:36

## 2019-08-04 RX ADMIN — ATORVASTATIN CALCIUM 80 MILLIGRAM(S): 80 TABLET, FILM COATED ORAL at 21:24

## 2019-08-04 RX ADMIN — Medication 1 GRAM(S): at 17:15

## 2019-08-04 NOTE — PROGRESS NOTE ADULT - ASSESSMENT
49M with PMH of PAF on eliquis, RUL subsegmental PE, hx of recent NSTEMI, hx of perforated antral ulcer (S/P  emergent ex-lap omentopexy and plication in 5/2019) , osteomyelitis s/p debridement,  HTN and DM2 presented to the ED at [place] on 7/31/19 complaining of chest pain.  On 8/1/19, patient had diagnostic  LHC revealing, RCA 90%; %.  He is now s/p BMS to RCA.   Patient was seen by GI and was cleared for Eliquis/Plavix and ASA.  NOw with lower abdominal pain for many weeks of unclear etiology.

## 2019-08-04 NOTE — PROGRESS NOTE ADULT - SUBJECTIVE AND OBJECTIVE BOX
Patient is a 49y old  Male who presents with a chief complaint of chest pain (03 Aug 2019 06:50)      INTERVAL HPI/OVERNIGHT EVENTS:    49M with PMH of PAF on eliquis, RUL subsegmental PE, hx of recent NSTEMI, hx of perforated antral ulcer (S/P  emergent ex-lap omentopexy and plication in 5/2019) , osteomyelitis s/p debridement,  HTN and DM2 presented to the ED at [place] on 7/31/19 complaining of chest pain.  On 8/1/19, patient had diagnostic  LHC revealing, RCA 90%; %.  He is now s/p BMS to RCA.   Patient was seen by GI and was cleared for Eliquis/Plavix and ASA.  Patient c/o constipation but has moved BM one day ago.  He states to have lower abd pain for many weeks and has no relief and is unable to function with the recurrent b/l lower abd pain rated as bed and unable to quantify with no known aggravating factors and not alleviated with no tylenol and Oxycodone makes dizzy and pain is crampy and intermittent.  Had vomiting once , no current nausea.      Currently patient feels ok, no bowel movements x 2 dyas.     T(C): 37 (08-04-19 @ 12:39), Max: 37 (08-04-19 @ 12:39)  HR: 61 (08-04-19 @ 12:39) (61 - 61)  BP: 114/72 (08-04-19 @ 12:39) (114/72 - 114/72)  RR: 18 (08-04-19 @ 12:39) (18 - 18)  SpO2: 95% (08-04-19 @ 12:39) (95% - 95%)    MEDICATIONS  (STANDING):  amiodarone    Tablet 100 milliGRAM(s) Oral <User Schedule>  apixaban 5 milliGRAM(s) Oral two times a day  aspirin enteric coated 81 milliGRAM(s) Oral daily  atorvastatin 80 milliGRAM(s) Oral at bedtime  clopidogrel Tablet 75 milliGRAM(s) Oral daily  dextrose 5%. 1000 milliLiter(s) (50 mL/Hr) IV Continuous <Continuous>  dextrose 50% Injectable 12.5 Gram(s) IV Push once  dextrose 50% Injectable 25 Gram(s) IV Push once  dextrose 50% Injectable 25 Gram(s) IV Push once  docusate sodium 100 milliGRAM(s) Oral three times a day  furosemide    Tablet 40 milliGRAM(s) Oral daily  insulin glargine Injectable (LANTUS) 24 Unit(s) SubCutaneous at bedtime  insulin lispro (HumaLOG) corrective regimen sliding scale   SubCutaneous three times a day before meals  insulin lispro (HumaLOG) corrective regimen sliding scale   SubCutaneous at bedtime  magnesium citrate Oral Solution 1 Bottle Oral once  pantoprazole    Tablet 40 milliGRAM(s) Oral two times a day  polyethylene glycol 3350 17 Gram(s) Oral two times a day  potassium chloride    Tablet ER 20 milliEquivalent(s) Oral daily  senna 2 Tablet(s) Oral at bedtime  sucralfate 1 Gram(s) Oral four times a day  tamsulosin 0.4 milliGRAM(s) Oral at bedtime    MEDICATIONS  (PRN):  dextrose 40% Gel 15 Gram(s) Oral once PRN Blood Glucose LESS THAN 70 milliGRAM(s)/deciliter  glucagon  Injectable 1 milliGRAM(s) IntraMuscular once PRN Glucose LESS THAN 70 milligrams/deciliter  simethicone 80 milliGRAM(s) Chew four times a day PRN Upset Stomach  traMADol 25 milliGRAM(s) Oral every 12 hours PRN Moderate Pain (4 - 6)      PHYSICAL EXAM:  GENERAL: NAD, well-groomed, well-developed  HEAD:  Atraumatic, Normocephalic  NECK: Supple, No JVD, Normal thyroid  NERVOUS SYSTEM:  Alert & Oriented X3, Good concentration  CHEST/LUNG: Clear to percussion bilaterally; No rales, rhonchi, wheezing, or rubs  HEART: Regular rate and rhythm; No murmurs, rubs, or gallops  ABDOMEN: Soft, pos umbilical hernia ( chronic as per pt) no rash , pos tenderness to palpation to the LT and RT lower quadrants with no rebound tenderness or involuntary guarding.   Bowel sounds present  EXTREMITIES:  Left dressing in place / not removed      : patient has jacques in place ( placed at [facility] as per patient due to urinary retention)       Consultant(s) Notes Reviewed:  [x ] YES  [ ] NO  Care Discussed with Consultants/Other Providers [ x] YES  [ ] NO  Name of Consultant                        9.7    8.6   )-----------( 321      ( 04 Aug 2019 06:12 )             30.4           LIVER FUNCTIONS - ( 04 Aug 2019 06:12 )  Alb: 3.5 g/dL / Pro: 7.2 g/dL / ALK PHOS: 129 U/L / ALT: 10 U/L / AST: 11 U/L / GGT: x             137|100|17<137  3.9|24|1.20  9.5,--,--  08-04 @ 06:12    CAPILLARY BLOOD GLUCOSE      POCT Blood Glucose.: 191 mg/dL (04 Aug 2019 17:58)  POCT Blood Glucose.: 217 mg/dL (04 Aug 2019 13:04)  POCT Blood Glucose.: 167 mg/dL (04 Aug 2019 09:52)  POCT Blood Glucose.: 225 mg/dL (03 Aug 2019 21:40)        RADIOLOGY & ADDITIONAL TESTS:  EKG :     Imaging Personally Reviewed:  [ ] YES  [ ] NO  HEALTH ISSUES - PROBLEM Dx:  ACP (advance care planning): ACP (advance care planning)  CAD (coronary artery disease): CAD (coronary artery disease)  Constipation: Constipation  Anemia: Anemia

## 2019-08-04 NOTE — PROGRESS NOTE ADULT - PROBLEM SELECTOR PLAN 1
-hgb remains stable  - continue ppi/carafate  - monitor cbc daily transfuse prbcs as needed  - no gi objection to triple therapy   - monitor stool color closely

## 2019-08-04 NOTE — PROGRESS NOTE ADULT - PROBLEM SELECTOR PLAN 1
- continue ppi/carafate.   - plans to scope as per GI.    - monitor cbc daily transfuse prbcs as needed  - no gi objection to triple therapy   - monitor stool color closely.

## 2019-08-04 NOTE — PROGRESS NOTE ADULT - ASSESSMENT
Mr. Kenney is a 49 M with PAF on eliquis, RUL subsegmental PE, hx of recent NSTEMI, hx of perforated antral ulcer, osteomyelitis s/p debridement,  HTN and DM2 presenting to [place] on 7/31/19 complaining of chest pain.   He is now s/p cath with mild LAD disease, 90% RCA and 100% RPL. He is now s/p BMS to RCA    - He is chest pain free this morning, though reports hematuria, and a bloody bm on friday. He reports significant constipation over last 48 hours.  - He will be on triple therapy for 2 weeks with ASA, Plavix and Eliquis. Monitor closely for bleeding. hb is stable.  - Sinus bradycardia on telemetry, though no additional AF. Continue with Amiodarone with hold parameters  - BB has been held in the setting of bradycardia  - continue with statin  - Euvolemic on exam. Continue with Po lasix.  - GI follow up for constipation and abdominal pain.  - Watch creatinine and electrolytes. Keep K>4, Mg>2  - The importance of strict follow up has been stressed.  - Will follow with you.

## 2019-08-04 NOTE — PROGRESS NOTE ADULT - SUBJECTIVE AND OBJECTIVE BOX
GASTROENTEROLOGY    Patient seen and examined at bedside  No acute overnight events noted  He denies nausea/vomiting/abdominal pain  No BM so far today  Tolerating diet    MEDICATIONS  (STANDING):  amiodarone    Tablet 100 milliGRAM(s) Oral <User Schedule>  apixaban 5 milliGRAM(s) Oral two times a day  aspirin enteric coated 81 milliGRAM(s) Oral daily  atorvastatin 80 milliGRAM(s) Oral at bedtime  clopidogrel Tablet 75 milliGRAM(s) Oral daily  dextrose 5%. 1000 milliLiter(s) (50 mL/Hr) IV Continuous <Continuous>  dextrose 50% Injectable 12.5 Gram(s) IV Push once  dextrose 50% Injectable 25 Gram(s) IV Push once  dextrose 50% Injectable 25 Gram(s) IV Push once  docusate sodium 100 milliGRAM(s) Oral three times a day  furosemide    Tablet 40 milliGRAM(s) Oral daily  insulin glargine Injectable (LANTUS) 24 Unit(s) SubCutaneous at bedtime  insulin lispro (HumaLOG) corrective regimen sliding scale   SubCutaneous three times a day before meals  insulin lispro (HumaLOG) corrective regimen sliding scale   SubCutaneous at bedtime  magnesium citrate Oral Solution 1 Bottle Oral once  pantoprazole    Tablet 40 milliGRAM(s) Oral two times a day  polyethylene glycol 3350 17 Gram(s) Oral two times a day  potassium chloride    Tablet ER 20 milliEquivalent(s) Oral daily  senna 2 Tablet(s) Oral at bedtime  sucralfate 1 Gram(s) Oral four times a day  tamsulosin 0.4 milliGRAM(s) Oral at bedtime        T(F): 97.3 (08-04-19 @ 04:24), Max: 99.1 (08-03-19 @ 21:28)  HR: 57 (08-04-19 @ 04:24) (57 - 64)  BP: 115/63 (08-04-19 @ 04:24) (115/63 - 154/89)  RR: 17 (08-04-19 @ 04:24) (17 - 18)  SpO2: 96% (08-04-19 @ 04:24) (95% - 98%)  Wt(kg): --    PHYSICAL EXAM  GENERAL:   NAD  HEENT:  NC/AT, no JVD, sclera-anicteric  CHEST:  clear to ascultation bilaterally, respirations nonlabored  HEART:  +S1+S2 regular rate and rhythm   ABDOMEN:  Soft, non-tender, non-distended, normoactive bowel sounds, no masses  EXTREMITIES:  no cyanosis, clubbing or edema  NEURO:  Alert, oriented  SKIN:  No rash/warm/dry      LABS:                        9.7<L>  8.6   )-----------( 321      ( 04 Aug 2019 06:12 )             30.4<L>  04 Aug 2019 06:12    08-04    137  |  100  |  17  ----------------------------<  137<H>  3.9   |  24  |  1.20    Ca    9.5      04 Aug 2019 06:12    TPro  7.2  /  Alb  3.5  /  TBili  0.2  /  DBili  x   /  AST  11  /  ALT  10  /  AlkPhos  129<H>  08-04    LIVER FUNCTIONS - ( 04 Aug 2019 06:12 )  Alb: 3.5 g/dL / Pro: 7.2 g/dL / ALK PHOS: 129 U/L / ALT: 10 U/L / AST: 11 U/L / GGT: x             I&O's Detail    03 Aug 2019 07:01  -  04 Aug 2019 07:00  --------------------------------------------------------  IN:  Total IN: 0 mL    OUT:    Indwelling Catheter - Urethral: 1500 mL  Total OUT: 1500 mL    Total NET: -1500 mL

## 2019-08-04 NOTE — PROGRESS NOTE ADULT - SUBJECTIVE AND OBJECTIVE BOX
[facility] - Bolivar Carbajal, El, Brittany, Zahra, Román Jacobs  Office Number:  [phone]    Patient resting comfortably in bed in NAD.  Laying flat with no respiratory distress.  No complaints of chest pain, dyspnea, palpitations, PND, or orthopnea.  some hematuria overnight, now with clear urine in bag  reports abd pain and constipation    ROS: negative unless otherwise mentioned.    Telemetry:  sr/sb    MEDICATIONS  (STANDING):  amiodarone    Tablet 100 milliGRAM(s) Oral <User Schedule>  apixaban 5 milliGRAM(s) Oral two times a day  aspirin enteric coated 81 milliGRAM(s) Oral daily  atorvastatin 80 milliGRAM(s) Oral at bedtime  clopidogrel Tablet 75 milliGRAM(s) Oral daily  dextrose 5%. 1000 milliLiter(s) (50 mL/Hr) IV Continuous <Continuous>  dextrose 50% Injectable 12.5 Gram(s) IV Push once  dextrose 50% Injectable 25 Gram(s) IV Push once  dextrose 50% Injectable 25 Gram(s) IV Push once  docusate sodium 100 milliGRAM(s) Oral three times a day  furosemide    Tablet 40 milliGRAM(s) Oral daily  insulin glargine Injectable (LANTUS) 24 Unit(s) SubCutaneous at bedtime  insulin lispro (HumaLOG) corrective regimen sliding scale   SubCutaneous three times a day before meals  insulin lispro (HumaLOG) corrective regimen sliding scale   SubCutaneous at bedtime  magnesium citrate Oral Solution 1 Bottle Oral once  pantoprazole    Tablet 40 milliGRAM(s) Oral two times a day  polyethylene glycol 3350 17 Gram(s) Oral two times a day  potassium chloride    Tablet ER 20 milliEquivalent(s) Oral daily  senna 2 Tablet(s) Oral at bedtime  sucralfate 1 Gram(s) Oral four times a day  tamsulosin 0.4 milliGRAM(s) Oral at bedtime    MEDICATIONS  (PRN):  dextrose 40% Gel 15 Gram(s) Oral once PRN Blood Glucose LESS THAN 70 milliGRAM(s)/deciliter  glucagon  Injectable 1 milliGRAM(s) IntraMuscular once PRN Glucose LESS THAN 70 milligrams/deciliter  traMADol 25 milliGRAM(s) Oral every 12 hours PRN Moderate Pain (4 - 6)      Allergies    fish (Hives)  No Known Drug Allergies    Intolerances        Vital Signs Last 24 Hrs  T(C): 36.3 (04 Aug 2019 04:24), Max: 37.3 (03 Aug 2019 21:28)  T(F): 97.3 (04 Aug 2019 04:24), Max: 99.1 (03 Aug 2019 21:28)  HR: 57 (04 Aug 2019 04:24) (57 - 64)  BP: 115/63 (04 Aug 2019 04:24) (115/63 - 154/89)  BP(mean): --  RR: 17 (04 Aug 2019 04:24) (17 - 18)  SpO2: 96% (04 Aug 2019 04:24) (95% - 98%)    I&O's Summary    03 Aug 2019 07:01  -  04 Aug 2019 07:00  --------------------------------------------------------  IN: 0 mL / OUT: 1500 mL / NET: -1500 mL        ON EXAM:    Constitutional: NAD, alert and oriented x 3  Lungs:  Non-labored, breath sounds are clear bilaterally, No wheezing, rales or rhonchi  Cardiovascular: braydcardic.  S1 and S2 positive.  No murmurs, rubs, gallops or clicks  Gastrointestinal: Bowel Sounds present, soft, nontender.   Lymph: No peripheral edema. No cervical lymphadenopathy.  Neurological: Alert, no focal deficits  Skin: No rashes or ulcers   Psych:  Mood & affect appropriate.      LABS: All Labs Reviewed:                        9.7    8.6   )-----------( 321      ( 04 Aug 2019 06:12 )             30.4                         9.6    8.3   )-----------( 310      ( 04 Aug 2019 00:29 )             29.9                         9.8    7.1   )-----------( 307      ( 03 Aug 2019 04:44 )             29.8     04 Aug 2019 06:12    137    |  100    |  17     ----------------------------<  137    3.9     |  24     |  1.20   03 Aug 2019 04:44    142    |  106    |  18     ----------------------------<  137    4.0     |  24     |  1.12   02 Aug 2019 00:38    138    |  102    |  14     ----------------------------<  166    3.9     |  24     |  1.14     Ca    9.5        04 Aug 2019 06:12  Ca    9.5        03 Aug 2019 04:44  Ca    9.6        02 Aug 2019 00:38    TPro  7.2    /  Alb  3.5    /  TBili  0.2    /  DBili  x      /  AST  11     /  ALT  10     /  AlkPhos  129    04 Aug 2019 06:12  TPro  6.7    /  Alb  3.4    /  TBili  0.2    /  DBili  x      /  AST  9      /  ALT  9      /  AlkPhos  125    03 Aug 2019 04:44          Blood Culture:

## 2019-08-05 LAB
ANION GAP SERPL CALC-SCNC: 15 MMOL/L — SIGNIFICANT CHANGE UP (ref 5–17)
BUN SERPL-MCNC: 20 MG/DL — SIGNIFICANT CHANGE UP (ref 7–23)
CALCIUM SERPL-MCNC: 9.8 MG/DL — SIGNIFICANT CHANGE UP (ref 8.4–10.5)
CHLORIDE SERPL-SCNC: 97 MMOL/L — SIGNIFICANT CHANGE UP (ref 96–108)
CO2 SERPL-SCNC: 25 MMOL/L — SIGNIFICANT CHANGE UP (ref 22–31)
CREAT SERPL-MCNC: 1.06 MG/DL — SIGNIFICANT CHANGE UP (ref 0.5–1.3)
GLUCOSE BLDC GLUCOMTR-MCNC: 103 MG/DL — HIGH (ref 70–99)
GLUCOSE BLDC GLUCOMTR-MCNC: 108 MG/DL — HIGH (ref 70–99)
GLUCOSE BLDC GLUCOMTR-MCNC: 142 MG/DL — HIGH (ref 70–99)
GLUCOSE BLDC GLUCOMTR-MCNC: 152 MG/DL — HIGH (ref 70–99)
GLUCOSE BLDC GLUCOMTR-MCNC: 208 MG/DL — HIGH (ref 70–99)
GLUCOSE SERPL-MCNC: 98 MG/DL — SIGNIFICANT CHANGE UP (ref 70–99)
HCT VFR BLD CALC: 32 % — LOW (ref 39–50)
HGB BLD-MCNC: 10.5 G/DL — LOW (ref 13–17)
MAGNESIUM SERPL-MCNC: 2.1 MG/DL — SIGNIFICANT CHANGE UP (ref 1.6–2.6)
MCHC RBC-ENTMCNC: 29.2 PG — SIGNIFICANT CHANGE UP (ref 27–34)
MCHC RBC-ENTMCNC: 32.9 GM/DL — SIGNIFICANT CHANGE UP (ref 32–36)
MCV RBC AUTO: 88.9 FL — SIGNIFICANT CHANGE UP (ref 80–100)
PHOSPHATE SERPL-MCNC: 3.8 MG/DL — SIGNIFICANT CHANGE UP (ref 2.5–4.5)
PLATELET # BLD AUTO: 360 K/UL — SIGNIFICANT CHANGE UP (ref 150–400)
POTASSIUM SERPL-MCNC: 4.1 MMOL/L — SIGNIFICANT CHANGE UP (ref 3.5–5.3)
POTASSIUM SERPL-SCNC: 4.1 MMOL/L — SIGNIFICANT CHANGE UP (ref 3.5–5.3)
RBC # BLD: 3.61 M/UL — LOW (ref 4.2–5.8)
RBC # FLD: 12.9 % — SIGNIFICANT CHANGE UP (ref 10.3–14.5)
SODIUM SERPL-SCNC: 137 MMOL/L — SIGNIFICANT CHANGE UP (ref 135–145)
WBC # BLD: 9.1 K/UL — SIGNIFICANT CHANGE UP (ref 3.8–10.5)
WBC # FLD AUTO: 9.1 K/UL — SIGNIFICANT CHANGE UP (ref 3.8–10.5)

## 2019-08-05 PROCEDURE — 76700 US EXAM ABDOM COMPLETE: CPT | Mod: 26

## 2019-08-05 PROCEDURE — 99232 SBSQ HOSP IP/OBS MODERATE 35: CPT

## 2019-08-05 PROCEDURE — 99233 SBSQ HOSP IP/OBS HIGH 50: CPT

## 2019-08-05 RX ORDER — LACTULOSE 10 G/15ML
10 SOLUTION ORAL ONCE
Refills: 0 | Status: COMPLETED | OUTPATIENT
Start: 2019-08-05 | End: 2019-08-05

## 2019-08-05 RX ADMIN — Medication 81 MILLIGRAM(S): at 11:57

## 2019-08-05 RX ADMIN — SENNA PLUS 2 TABLET(S): 8.6 TABLET ORAL at 22:07

## 2019-08-05 RX ADMIN — CLOPIDOGREL BISULFATE 75 MILLIGRAM(S): 75 TABLET, FILM COATED ORAL at 11:57

## 2019-08-05 RX ADMIN — TRAMADOL HYDROCHLORIDE 25 MILLIGRAM(S): 50 TABLET ORAL at 23:00

## 2019-08-05 RX ADMIN — SIMETHICONE 80 MILLIGRAM(S): 80 TABLET, CHEWABLE ORAL at 05:16

## 2019-08-05 RX ADMIN — LACTULOSE 10 GRAM(S): 10 SOLUTION ORAL at 11:58

## 2019-08-05 RX ADMIN — AMIODARONE HYDROCHLORIDE 100 MILLIGRAM(S): 400 TABLET ORAL at 10:21

## 2019-08-05 RX ADMIN — Medication 1 GRAM(S): at 17:33

## 2019-08-05 RX ADMIN — Medication 1 GRAM(S): at 05:16

## 2019-08-05 RX ADMIN — Medication 100 MILLIGRAM(S): at 05:16

## 2019-08-05 RX ADMIN — PANTOPRAZOLE SODIUM 40 MILLIGRAM(S): 20 TABLET, DELAYED RELEASE ORAL at 05:16

## 2019-08-05 RX ADMIN — TAMSULOSIN HYDROCHLORIDE 0.4 MILLIGRAM(S): 0.4 CAPSULE ORAL at 22:07

## 2019-08-05 RX ADMIN — PANTOPRAZOLE SODIUM 40 MILLIGRAM(S): 20 TABLET, DELAYED RELEASE ORAL at 17:33

## 2019-08-05 RX ADMIN — Medication 1 GRAM(S): at 11:57

## 2019-08-05 RX ADMIN — Medication 40 MILLIGRAM(S): at 05:16

## 2019-08-05 RX ADMIN — ATORVASTATIN CALCIUM 80 MILLIGRAM(S): 80 TABLET, FILM COATED ORAL at 22:07

## 2019-08-05 RX ADMIN — TRAMADOL HYDROCHLORIDE 25 MILLIGRAM(S): 50 TABLET ORAL at 22:11

## 2019-08-05 RX ADMIN — Medication 1: at 18:35

## 2019-08-05 RX ADMIN — INSULIN GLARGINE 24 UNIT(S): 100 INJECTION, SOLUTION SUBCUTANEOUS at 22:06

## 2019-08-05 RX ADMIN — APIXABAN 5 MILLIGRAM(S): 2.5 TABLET, FILM COATED ORAL at 22:07

## 2019-08-05 RX ADMIN — APIXABAN 5 MILLIGRAM(S): 2.5 TABLET, FILM COATED ORAL at 10:21

## 2019-08-05 RX ADMIN — Medication 100 MILLIGRAM(S): at 22:07

## 2019-08-05 RX ADMIN — Medication 20 MILLIEQUIVALENT(S): at 11:57

## 2019-08-05 RX ADMIN — POLYETHYLENE GLYCOL 3350 17 GRAM(S): 17 POWDER, FOR SOLUTION ORAL at 17:33

## 2019-08-05 RX ADMIN — Medication 100 MILLIGRAM(S): at 14:34

## 2019-08-05 NOTE — PROGRESS NOTE ADULT - ASSESSMENT
Mr. Kenney is a 49 M with PAF on eliquis, RUL subsegmental PE, hx of recent NSTEMI, hx of perforated antral ulcer, osteomyelitis s/p debridement,  HTN and DM2 presenting to [place] on 7/31/19 complaining of chest pain.   He is now s/p cath with mild LAD disease, 90% RCA and 100% RPL. He is now s/p BMS to RCA    - He is chest pain free this morning. He did have a reported bloody BM on Friday and hematuria on Saturday. He reports significant constipation over last 48 hours.  - He will be on triple therapy for 2 weeks with ASA, Plavix and Eliquis. Monitor closely for bleeding. hb is stable.  - Sinus bradycardia on telemetry, though no additional AF. Continue with Amiodarone with hold parameters  - BB has been held in the setting of bradycardia  - continue with statin  - Euvolemic on exam. Continue with Po lasix.  - GI follow up for constipation and abdominal pain.  - Watch creatinine and electrolytes. Keep K>4, Mg>2  - The importance of strict follow up has been stressed.  - Will follow with you. Mr. Kenney is a 49 M with PAF on eliquis, RUL subsegmental PE, hx of recent NSTEMI, hx of perforated antral ulcer, osteomyelitis s/p debridement,  HTN and DM2 presenting to [place] on 7/31/19 complaining of chest pain.   He is now s/p cath with mild LAD disease, 90% RCA and 100% RPL. He is now s/p BMS to RCA    - He is chest pain free this morning. He did have a reported bloody BM on Friday and hematuria on Saturday. He reports significant constipation over last 48 hours.  - He will be on triple therapy for 1 week with ASA, Plavix and Eliquis, until Saturday. We will then d/c asa and continue plavix with eliquis for the next 3 weeks. Monitor closely for bleeding. hb is stable.  - Sinus bradycardia on telemetry, though no additional AF. Continue with Amiodarone with hold parameters  - BB has been held in the setting of bradycardia  - continue with statin  - Euvolemic on exam. Continue with Po lasix.  - GI follow up for constipation and abdominal pain.  - Watch creatinine and electrolytes. Keep K>4, Mg>2  - The importance of strict follow up has been stressed.  - Will follow with you.

## 2019-08-05 NOTE — PROGRESS NOTE ADULT - ASSESSMENT
49M with PMH of PAF on eliquis, RUL subsegmental PE, hx of recent NSTEMI, hx of perforated antral ulcer (S/P  emergent ex-lap omentopexy and plication in 5/2019) , osteomyelitis s/p debridement,  HTN and DM2 presented to the ED at [place] on 7/31/19 complaining of chest pain.  On 8/1/19, patient had diagnostic  LHC revealing, RCA 90%; %.  He is now s/p BMS to RCA.   Patient was seen by GI and was cleared for Eliquis/Plavix and ASA.  NOw with lower abdominal pain for many weeks of unclear etiology.

## 2019-08-05 NOTE — PROGRESS NOTE ADULT - PROBLEM SELECTOR PLAN 1
- hgb remains stable  - continue ppi/carafate  - monitor cbc daily transfuse prbcs as needed  - no gi objection to triple therapy   - monitor stool color closely

## 2019-08-05 NOTE — PROGRESS NOTE ADULT - SUBJECTIVE AND OBJECTIVE BOX
[facility] - Bolivar Carbajal, El, Brittany, Zahra, Román Jacobs  Office Number:  [phone]    Patient resting comfortably in bed in NAD.  Laying flat with no respiratory distress.  No complaints of chest pain, dyspnea, palpitations, PND, or orthopnea.  still reports abdominal discomfort and constipation.    ROS: negative unless otherwise mentioned.    Telemetry:  sb    MEDICATIONS  (STANDING):  amiodarone    Tablet 100 milliGRAM(s) Oral <User Schedule>  apixaban 5 milliGRAM(s) Oral two times a day  aspirin enteric coated 81 milliGRAM(s) Oral daily  atorvastatin 80 milliGRAM(s) Oral at bedtime  clopidogrel Tablet 75 milliGRAM(s) Oral daily  dextrose 5%. 1000 milliLiter(s) (50 mL/Hr) IV Continuous <Continuous>  dextrose 50% Injectable 12.5 Gram(s) IV Push once  dextrose 50% Injectable 25 Gram(s) IV Push once  dextrose 50% Injectable 25 Gram(s) IV Push once  docusate sodium 100 milliGRAM(s) Oral three times a day  furosemide    Tablet 40 milliGRAM(s) Oral daily  insulin glargine Injectable (LANTUS) 24 Unit(s) SubCutaneous at bedtime  insulin lispro (HumaLOG) corrective regimen sliding scale   SubCutaneous three times a day before meals  insulin lispro (HumaLOG) corrective regimen sliding scale   SubCutaneous at bedtime  lactulose Syrup 10 Gram(s) Oral once  pantoprazole    Tablet 40 milliGRAM(s) Oral two times a day  polyethylene glycol 3350 17 Gram(s) Oral two times a day  potassium chloride    Tablet ER 20 milliEquivalent(s) Oral daily  senna 2 Tablet(s) Oral at bedtime  sucralfate 1 Gram(s) Oral four times a day  tamsulosin 0.4 milliGRAM(s) Oral at bedtime    MEDICATIONS  (PRN):  dextrose 40% Gel 15 Gram(s) Oral once PRN Blood Glucose LESS THAN 70 milliGRAM(s)/deciliter  glucagon  Injectable 1 milliGRAM(s) IntraMuscular once PRN Glucose LESS THAN 70 milligrams/deciliter  simethicone 80 milliGRAM(s) Chew four times a day PRN Upset Stomach  traMADol 25 milliGRAM(s) Oral every 12 hours PRN Moderate Pain (4 - 6)      Allergies    fish (Hives)  No Known Drug Allergies    Intolerances        Vital Signs Last 24 Hrs  T(C): 36.3 (05 Aug 2019 05:05), Max: 37 (04 Aug 2019 12:39)  T(F): 97.3 (05 Aug 2019 05:05), Max: 98.6 (04 Aug 2019 12:39)  HR: 56 (05 Aug 2019 10:17) (56 - 68)  BP: 127/73 (05 Aug 2019 10:17) (113/66 - 127/73)  BP(mean): --  RR: 18 (05 Aug 2019 05:05) (18 - 18)  SpO2: 96% (05 Aug 2019 05:05) (95% - 98%)    I&O's Summary    04 Aug 2019 07:01  -  05 Aug 2019 07:00  --------------------------------------------------------  IN: 835 mL / OUT: 3675 mL / NET: -2840 mL    05 Aug 2019 07:01  -  05 Aug 2019 10:25  --------------------------------------------------------  IN: 0 mL / OUT: 900 mL / NET: -900 mL        ON EXAM:    Constitutional: NAD, alert and oriented x 3  Lungs:  Non-labored, breath sounds are clear bilaterally, No wheezing, rales or rhonchi  Cardiovascular: bradycardic  S1 and S2 positive.  No murmurs, rubs, gallops or clicks  Gastrointestinal: Bowel Sounds present, soft, nontender.   Lymph: No peripheral edema. No cervical lymphadenopathy.  Neurological: Alert, no focal deficits  Skin: No rashes or ulcers   Psych:  Mood & affect appropriate.    LABS: All Labs Reviewed:                        9.7    8.6   )-----------( 321      ( 04 Aug 2019 06:12 )             30.4                         9.6    8.3   )-----------( 310      ( 04 Aug 2019 00:29 )             29.9                         9.8    7.1   )-----------( 307      ( 03 Aug 2019 04:44 )             29.8     04 Aug 2019 06:12    137    |  100    |  17     ----------------------------<  137    3.9     |  24     |  1.20   03 Aug 2019 04:44    142    |  106    |  18     ----------------------------<  137    4.0     |  24     |  1.12     Ca    9.5        04 Aug 2019 06:12  Ca    9.5        03 Aug 2019 04:44    TPro  7.2    /  Alb  3.5    /  TBili  0.2    /  DBili  x      /  AST  11     /  ALT  10     /  AlkPhos  129    04 Aug 2019 06:12  TPro  6.7    /  Alb  3.4    /  TBili  0.2    /  DBili  x      /  AST  9      /  ALT  9      /  AlkPhos  125    03 Aug 2019 04:44          Blood Culture:

## 2019-08-05 NOTE — PROGRESS NOTE ADULT - SUBJECTIVE AND OBJECTIVE BOX
Patient is a 49y old  Male who presents with a chief complaint of chest pain (05 Aug 2019 10:25)      INTERVAL HPI/OVERNIGHT EVENTS:    Patient is seen in AM and states to still have constipation       Medications:MEDICATIONS  (STANDING):  amiodarone    Tablet 100 milliGRAM(s) Oral <User Schedule>  apixaban 5 milliGRAM(s) Oral two times a day  aspirin enteric coated 81 milliGRAM(s) Oral daily  atorvastatin 80 milliGRAM(s) Oral at bedtime  clopidogrel Tablet 75 milliGRAM(s) Oral daily  dextrose 5%. 1000 milliLiter(s) (50 mL/Hr) IV Continuous <Continuous>  dextrose 50% Injectable 12.5 Gram(s) IV Push once  dextrose 50% Injectable 25 Gram(s) IV Push once  dextrose 50% Injectable 25 Gram(s) IV Push once  docusate sodium 100 milliGRAM(s) Oral three times a day  furosemide    Tablet 40 milliGRAM(s) Oral daily  insulin glargine Injectable (LANTUS) 24 Unit(s) SubCutaneous at bedtime  insulin lispro (HumaLOG) corrective regimen sliding scale   SubCutaneous three times a day before meals  insulin lispro (HumaLOG) corrective regimen sliding scale   SubCutaneous at bedtime  pantoprazole    Tablet 40 milliGRAM(s) Oral two times a day  polyethylene glycol 3350 17 Gram(s) Oral two times a day  potassium chloride    Tablet ER 20 milliEquivalent(s) Oral daily  senna 2 Tablet(s) Oral at bedtime  sucralfate 1 Gram(s) Oral four times a day  tamsulosin 0.4 milliGRAM(s) Oral at bedtime    MEDICATIONS  (PRN):  dextrose 40% Gel 15 Gram(s) Oral once PRN Blood Glucose LESS THAN 70 milliGRAM(s)/deciliter  glucagon  Injectable 1 milliGRAM(s) IntraMuscular once PRN Glucose LESS THAN 70 milligrams/deciliter  simethicone 80 milliGRAM(s) Chew four times a day PRN Upset Stomach  traMADol 25 milliGRAM(s) Oral every 12 hours PRN Moderate Pain (4 - 6)      Allergies: Allergies    fish (Hives)  No Known Drug Allergies        REVIEW OF SYSTEMS:  CONSTITUTIONAL: No fever  NECK: No pain or stiffness  RESPIRATORY: No cough, wheezing, chills or hemoptysis; No shortness of breath  CARDIOVASCULAR: No chest pain, palpitations, dizziness, or leg swelling  GASTROINTESTINAL: pos  abdominal  pain. No nausea, vomiting, or hematemesis; pos constipation.   GENITOURINARY: No dysuria  NEUROLOGICAL: No headaches      T(C): 36.6 (08-05-19 @ 16:29), Max: 36.8 (08-04-19 @ 20:04)  HR: 63 (08-05-19 @ 16:29) (56 - 68)  BP: 127/77 (08-05-19 @ 16:29) (113/66 - 127/77)  RR: 16 (08-05-19 @ 16:29) (16 - 18)  SpO2: 96% (08-05-19 @ 16:29) (96% - 98%)  Wt(kg): --Vital Signs Last 24 Hrs  T(C): 36.6 (05 Aug 2019 16:29), Max: 36.8 (04 Aug 2019 20:04)  T(F): 97.8 (05 Aug 2019 16:29), Max: 98.2 (04 Aug 2019 20:04)  HR: 63 (05 Aug 2019 16:29) (56 - 68)  BP: 127/77 (05 Aug 2019 16:29) (113/66 - 127/77)  BP(mean): --  RR: 16 (05 Aug 2019 16:29) (16 - 18)  SpO2: 96% (05 Aug 2019 16:29) (96% - 98%)  I&O's Summary    04 Aug 2019 07:01  -  05 Aug 2019 07:00  --------------------------------------------------------  IN: 835 mL / OUT: 3675 mL / NET: -2840 mL    05 Aug 2019 07:01  -  05 Aug 2019 16:50  --------------------------------------------------------  IN: 355 mL / OUT: 900 mL / NET: -545 mL          PHYSICAL EXAM:  GENERAL: NAD, well-groomed, well-developed  HEAD:  Atraumatic, Normocephalic  NECK: Supple, No JVD, Normal thyroid  NERVOUS SYSTEM:  Alert & Oriented X3, Good concentration  CHEST/LUNG: Clear to percussion bilaterally; No rales, rhonchi, wheezing, or rubs  HEART: Regular rate and rhythm; No murmurs, rubs, or gallops  ABDOMEN: Soft, Nontender, Nondistended; Bowel sounds present  EXTREMITIES:  2+ Peripheral Pulses, No clubbing, cyanosis, or edema/ Left foot with clean wound       Consultant(s) Notes Reviewed:  [x ] YES  [ ] NO  Care Discussed with Consultants/Other Providers [ x] YES  [ ] NO  Name of Consultant  LABS:                        10.5   9.1   )-----------( 360      ( 05 Aug 2019 15:31 )             32.0     08-05    137  |  97  |  20  ----------------------------<  98  4.1   |  25  |  1.06    Ca    9.8      05 Aug 2019 15:31  Phos  3.8     08-05  Mg     2.1     08-05    TPro  7.2  /  Alb  3.5  /  TBili  0.2  /  DBili  x   /  AST  11  /  ALT  10  /  AlkPhos  129<H>  08-04        CAPILLARY BLOOD GLUCOSE      POCT Blood Glucose.: 103 mg/dL (05 Aug 2019 15:35)  POCT Blood Glucose.: 108 mg/dL (05 Aug 2019 13:06)  POCT Blood Glucose.: 142 mg/dL (05 Aug 2019 08:57)  POCT Blood Glucose.: 254 mg/dL (04 Aug 2019 21:25)  POCT Blood Glucose.: 191 mg/dL (04 Aug 2019 17:58)            RADIOLOGY & ADDITIONAL TESTS:  EKG :     Imaging Personally Reviewed:  [ ] YES  [ ] NO  HEALTH ISSUES - PROBLEM Dx:  Anemia, unspecified type: Anemia, unspecified type  PAF (paroxysmal atrial fibrillation): PAF (paroxysmal atrial fibrillation)  Urinary retention: Urinary retention  Chronic gastric ulcer with perforation: Chronic gastric ulcer with perforation  Essential hypertension: Essential hypertension  Lower abdominal pain: Lower abdominal pain  Chronic pulmonary embolism without acute cor pulmonale, unspecified pulmonary embolism type: Chronic pulmonary embolism without acute cor pulmonale, unspecified pulmonary embolism type  DM type 2, goal HbA1c < 7%: DM type 2, goal HbA1c &lt; 7%  Coronary artery disease involving native coronary artery of native heart, angina presence unspecified: Coronary artery disease involving native coronary artery of native heart, angina presence unspecified  ACP (advance care planning): ACP (advance care planning)  CAD (coronary artery disease): CAD (coronary artery disease)  Constipation: Constipation  Anemia: Anemia

## 2019-08-05 NOTE — PROGRESS NOTE ADULT - SUBJECTIVE AND OBJECTIVE BOX
INTERVAL HPI/OVERNIGHT EVENTS:    Patient seen and examined at bedside  No acute overnight events noted  He denies nausea/vomiting/abdominal pain  No BM so far today  Tolerating diet  await am cbc    MEDICATIONS  (STANDING):  amiodarone    Tablet 100 milliGRAM(s) Oral <User Schedule>  apixaban 5 milliGRAM(s) Oral two times a day  aspirin enteric coated 81 milliGRAM(s) Oral daily  atorvastatin 80 milliGRAM(s) Oral at bedtime  clopidogrel Tablet 75 milliGRAM(s) Oral daily  dextrose 5%. 1000 milliLiter(s) (50 mL/Hr) IV Continuous <Continuous>  dextrose 50% Injectable 12.5 Gram(s) IV Push once  dextrose 50% Injectable 25 Gram(s) IV Push once  dextrose 50% Injectable 25 Gram(s) IV Push once  docusate sodium 100 milliGRAM(s) Oral three times a day  furosemide    Tablet 40 milliGRAM(s) Oral daily  insulin glargine Injectable (LANTUS) 24 Unit(s) SubCutaneous at bedtime  insulin lispro (HumaLOG) corrective regimen sliding scale   SubCutaneous three times a day before meals  insulin lispro (HumaLOG) corrective regimen sliding scale   SubCutaneous at bedtime  pantoprazole    Tablet 40 milliGRAM(s) Oral two times a day  polyethylene glycol 3350 17 Gram(s) Oral two times a day  potassium chloride    Tablet ER 20 milliEquivalent(s) Oral daily  senna 2 Tablet(s) Oral at bedtime  sucralfate 1 Gram(s) Oral four times a day  tamsulosin 0.4 milliGRAM(s) Oral at bedtime    MEDICATIONS  (PRN):  dextrose 40% Gel 15 Gram(s) Oral once PRN Blood Glucose LESS THAN 70 milliGRAM(s)/deciliter  glucagon  Injectable 1 milliGRAM(s) IntraMuscular once PRN Glucose LESS THAN 70 milligrams/deciliter  simethicone 80 milliGRAM(s) Chew four times a day PRN Upset Stomach  traMADol 25 milliGRAM(s) Oral every 12 hours PRN Moderate Pain (4 - 6)      Allergies    fish (Hives)  No Known Drug Allergies    Intolerances        Review of Systems:    General:  No wt loss, fevers, chills, night sweats,fatigue,   Eyes:  Good vision, no reported pain  ENT:  No sore throat, pain, runny nose, dysphagia  CV:  No pain, palpitations, hypo/hypertension  Resp:  No dyspnea, cough, tachypnea, wheezing  GI:  No pain, No nausea, No vomiting, No diarrhea, No constipation, No weight loss, No fever, No pruritis, No rectal bleeding, No melena, No dysphagia  :  No pain, bleeding, incontinence, nocturia  Muscle:  No pain, weakness  Neuro:  No weakness, tingling, memory problems  Psych:  No fatigue, insomnia, mood problems, depression  Endocrine:  No polyuria, polydypsia, cold/heat intolerance  Heme:  No petechiae, ecchymosis, easy bruisability  Skin:  No rash, tattoos, scars, edema      Vital Signs Last 24 Hrs  T(C): 36.7 (05 Aug 2019 10:17), Max: 37 (04 Aug 2019 12:39)  T(F): 98 (05 Aug 2019 10:17), Max: 98.6 (04 Aug 2019 12:39)  HR: 56 (05 Aug 2019 10:17) (56 - 68)  BP: 127/73 (05 Aug 2019 10:17) (113/66 - 127/73)  BP(mean): --  RR: 16 (05 Aug 2019 10:17) (16 - 18)  SpO2: 97% (05 Aug 2019 10:17) (95% - 98%)    PHYSICAL EXAM:    Constitutional: NAD, well-developed  HEENT: EOMI, throat clear  Neck: No LAD, supple  Respiratory: CTA and P  Cardiovascular: S1 and S2, RRR, no M  Gastrointestinal: BS+, soft, NT/ND, neg HSM,  Extremities: No peripheral edema, neg clubing, cyanosis  Vascular: 2+ peripheral pulses  Neurological: A/O x 3, no focal deficits  Psychiatric: Normal mood, normal affect  Skin: No rashes      LABS:                        9.7    8.6   )-----------( 321      ( 04 Aug 2019 06:12 )             30.4     08-04    137  |  100  |  17  ----------------------------<  137<H>  3.9   |  24  |  1.20    Ca    9.5      04 Aug 2019 06:12    TPro  7.2  /  Alb  3.5  /  TBili  0.2  /  DBili  x   /  AST  11  /  ALT  10  /  AlkPhos  129<H>  08-04          RADIOLOGY & ADDITIONAL TESTS:

## 2019-08-06 ENCOUNTER — TRANSCRIPTION ENCOUNTER (OUTPATIENT)
Age: 49
End: 2019-08-06

## 2019-08-06 LAB
GLUCOSE BLDC GLUCOMTR-MCNC: 165 MG/DL — HIGH (ref 70–99)
GLUCOSE BLDC GLUCOMTR-MCNC: 232 MG/DL — HIGH (ref 70–99)
GLUCOSE BLDC GLUCOMTR-MCNC: 252 MG/DL — HIGH (ref 70–99)
GLUCOSE BLDC GLUCOMTR-MCNC: 291 MG/DL — HIGH (ref 70–99)

## 2019-08-06 PROCEDURE — 99232 SBSQ HOSP IP/OBS MODERATE 35: CPT

## 2019-08-06 RX ORDER — ASPIRIN/CALCIUM CARB/MAGNESIUM 324 MG
1 TABLET ORAL
Qty: 0 | Refills: 0 | DISCHARGE

## 2019-08-06 RX ORDER — APIXABAN 2.5 MG/1
1 TABLET, FILM COATED ORAL
Qty: 0 | Refills: 0 | DISCHARGE

## 2019-08-06 RX ORDER — CLOPIDOGREL BISULFATE 75 MG/1
1 TABLET, FILM COATED ORAL
Qty: 90 | Refills: 0
Start: 2019-08-06 | End: 2019-11-03

## 2019-08-06 RX ORDER — TRAMADOL HYDROCHLORIDE 50 MG/1
0.5 TABLET ORAL
Qty: 5 | Refills: 0
Start: 2019-08-06 | End: 2019-08-10

## 2019-08-06 RX ADMIN — SENNA PLUS 2 TABLET(S): 8.6 TABLET ORAL at 21:20

## 2019-08-06 RX ADMIN — POLYETHYLENE GLYCOL 3350 17 GRAM(S): 17 POWDER, FOR SOLUTION ORAL at 05:33

## 2019-08-06 RX ADMIN — Medication 3: at 18:49

## 2019-08-06 RX ADMIN — CLOPIDOGREL BISULFATE 75 MILLIGRAM(S): 75 TABLET, FILM COATED ORAL at 11:22

## 2019-08-06 RX ADMIN — Medication 1 GRAM(S): at 17:50

## 2019-08-06 RX ADMIN — Medication 81 MILLIGRAM(S): at 11:22

## 2019-08-06 RX ADMIN — POLYETHYLENE GLYCOL 3350 17 GRAM(S): 17 POWDER, FOR SOLUTION ORAL at 17:50

## 2019-08-06 RX ADMIN — TAMSULOSIN HYDROCHLORIDE 0.4 MILLIGRAM(S): 0.4 CAPSULE ORAL at 21:20

## 2019-08-06 RX ADMIN — Medication 1: at 09:21

## 2019-08-06 RX ADMIN — APIXABAN 5 MILLIGRAM(S): 2.5 TABLET, FILM COATED ORAL at 21:20

## 2019-08-06 RX ADMIN — Medication 1 GRAM(S): at 11:22

## 2019-08-06 RX ADMIN — SIMETHICONE 80 MILLIGRAM(S): 80 TABLET, CHEWABLE ORAL at 21:19

## 2019-08-06 RX ADMIN — Medication 3: at 13:51

## 2019-08-06 RX ADMIN — Medication 100 MILLIGRAM(S): at 13:51

## 2019-08-06 RX ADMIN — Medication 40 MILLIGRAM(S): at 05:33

## 2019-08-06 RX ADMIN — Medication 1 GRAM(S): at 01:00

## 2019-08-06 RX ADMIN — APIXABAN 5 MILLIGRAM(S): 2.5 TABLET, FILM COATED ORAL at 09:22

## 2019-08-06 RX ADMIN — TRAMADOL HYDROCHLORIDE 25 MILLIGRAM(S): 50 TABLET ORAL at 21:20

## 2019-08-06 RX ADMIN — PANTOPRAZOLE SODIUM 40 MILLIGRAM(S): 20 TABLET, DELAYED RELEASE ORAL at 05:33

## 2019-08-06 RX ADMIN — Medication 100 MILLIGRAM(S): at 21:20

## 2019-08-06 RX ADMIN — PANTOPRAZOLE SODIUM 40 MILLIGRAM(S): 20 TABLET, DELAYED RELEASE ORAL at 17:50

## 2019-08-06 RX ADMIN — Medication 100 MILLIGRAM(S): at 05:33

## 2019-08-06 RX ADMIN — Medication 1 GRAM(S): at 05:33

## 2019-08-06 RX ADMIN — ATORVASTATIN CALCIUM 80 MILLIGRAM(S): 80 TABLET, FILM COATED ORAL at 21:20

## 2019-08-06 RX ADMIN — AMIODARONE HYDROCHLORIDE 100 MILLIGRAM(S): 400 TABLET ORAL at 09:21

## 2019-08-06 RX ADMIN — INSULIN GLARGINE 24 UNIT(S): 100 INJECTION, SOLUTION SUBCUTANEOUS at 21:19

## 2019-08-06 RX ADMIN — Medication 20 MILLIEQUIVALENT(S): at 11:22

## 2019-08-06 NOTE — PROGRESS NOTE ADULT - PROBLEM SELECTOR PLAN 1
- continue ppi/carafate.   - Case was discussed with HAKEEM Vega with no plan for EGD or Colonoscopy and patient is to f/up as outpatient with GI post dc.  Patient is to call the office once dc from the hospital   - monitor cbc daily transfuse prbcs as needed  - no gi objection to triple therapy as noted   - monitor stool color closely.

## 2019-08-06 NOTE — DISCHARGE NOTE NURSING/CASE MANAGEMENT/SOCIAL WORK - NSDCDPATPORTLINK_GEN_ALL_CORE
You can access the [facility] Patient Portal, offered by [facility], by registering with the following website: [email or web address]

## 2019-08-06 NOTE — PROGRESS NOTE ADULT - SUBJECTIVE AND OBJECTIVE BOX
Patient is a 49y old  Male who presents with a chief complaint of Chest Pain (05 Aug 2019 16:49)      INTERVAL HPI/OVERNIGHT EVENTS:    Medications:MEDICATIONS  (STANDING):  amiodarone    Tablet 100 milliGRAM(s) Oral <User Schedule>  apixaban 5 milliGRAM(s) Oral two times a day  aspirin enteric coated 81 milliGRAM(s) Oral daily  atorvastatin 80 milliGRAM(s) Oral at bedtime  clopidogrel Tablet 75 milliGRAM(s) Oral daily  dextrose 5%. 1000 milliLiter(s) (50 mL/Hr) IV Continuous <Continuous>  dextrose 50% Injectable 12.5 Gram(s) IV Push once  dextrose 50% Injectable 25 Gram(s) IV Push once  dextrose 50% Injectable 25 Gram(s) IV Push once  docusate sodium 100 milliGRAM(s) Oral three times a day  furosemide    Tablet 40 milliGRAM(s) Oral daily  insulin glargine Injectable (LANTUS) 24 Unit(s) SubCutaneous at bedtime  insulin lispro (HumaLOG) corrective regimen sliding scale   SubCutaneous three times a day before meals  insulin lispro (HumaLOG) corrective regimen sliding scale   SubCutaneous at bedtime  pantoprazole    Tablet 40 milliGRAM(s) Oral two times a day  polyethylene glycol 3350 17 Gram(s) Oral two times a day  potassium chloride    Tablet ER 20 milliEquivalent(s) Oral daily  senna 2 Tablet(s) Oral at bedtime  sucralfate 1 Gram(s) Oral four times a day  tamsulosin 0.4 milliGRAM(s) Oral at bedtime    MEDICATIONS  (PRN):  bisacodyl Suppository 10 milliGRAM(s) Rectal daily PRN Constipation  dextrose 40% Gel 15 Gram(s) Oral once PRN Blood Glucose LESS THAN 70 milliGRAM(s)/deciliter  glucagon  Injectable 1 milliGRAM(s) IntraMuscular once PRN Glucose LESS THAN 70 milligrams/deciliter  simethicone 80 milliGRAM(s) Chew four times a day PRN Upset Stomach  traMADol 25 milliGRAM(s) Oral every 12 hours PRN Moderate Pain (4 - 6)      Allergies: Allergies    fish (Hives)  No Known Drug Allergies        REVIEW OF SYSTEMS:        T(C): 37.1 (08-06-19 @ 09:22), Max: 37.1 (08-06-19 @ 09:22)  HR: 62 (08-06-19 @ 09:22) (60 - 71)  BP: 122/72 (08-06-19 @ 09:22) (112/65 - 129/74)  RR: 18 (08-06-19 @ 09:22) (16 - 18)  SpO2: 94% (08-06-19 @ 09:22) (94% - 98%)  Wt(kg): --Vital Signs Last 24 Hrs  T(C): 37.1 (06 Aug 2019 09:22), Max: 37.1 (06 Aug 2019 09:22)  T(F): 98.7 (06 Aug 2019 09:22), Max: 98.7 (06 Aug 2019 09:22)  HR: 62 (06 Aug 2019 09:22) (60 - 71)  BP: 122/72 (06 Aug 2019 09:22) (112/65 - 129/74)  BP(mean): --  RR: 18 (06 Aug 2019 09:22) (16 - 18)  SpO2: 94% (06 Aug 2019 09:22) (94% - 98%)  I&O's Summary    05 Aug 2019 07:01  -  06 Aug 2019 07:00  --------------------------------------------------------  IN: 355 mL / OUT: 3200 mL / NET: -2845 mL        PHYSICAL EXAM:      Consultant(s) Notes Reviewed:  [x ] YES  [ ] NO  Care Discussed with Consultants/Other Providers [ x] YES  [ ] NO  Name of Consultant  LABS:                        10.5   9.1   )-----------( 360      ( 05 Aug 2019 15:31 )             32.0     08-05    137  |  97  |  20  ----------------------------<  98  4.1   |  25  |  1.06    Ca    9.8      05 Aug 2019 15:31  Phos  3.8     08-05  Mg     2.1     08-05          CAPILLARY BLOOD GLUCOSE      POCT Blood Glucose.: 165 mg/dL (06 Aug 2019 08:31)  POCT Blood Glucose.: 208 mg/dL (05 Aug 2019 21:26)  POCT Blood Glucose.: 152 mg/dL (05 Aug 2019 18:11)  POCT Blood Glucose.: 103 mg/dL (05 Aug 2019 15:35)  POCT Blood Glucose.: 108 mg/dL (05 Aug 2019 13:06)            RADIOLOGY & ADDITIONAL TESTS:  EKG :     Imaging Personally Reviewed:  [ ] YES  [ ] NO  HEALTH ISSUES - PROBLEM Dx:  Anemia, unspecified type: Anemia, unspecified type  PAF (paroxysmal atrial fibrillation): PAF (paroxysmal atrial fibrillation)  Urinary retention: Urinary retention  Chronic gastric ulcer with perforation: Chronic gastric ulcer with perforation  Essential hypertension: Essential hypertension  Lower abdominal pain: Lower abdominal pain  Chronic pulmonary embolism without acute cor pulmonale, unspecified pulmonary embolism type: Chronic pulmonary embolism without acute cor pulmonale, unspecified pulmonary embolism type  DM type 2, goal HbA1c < 7%: DM type 2, goal HbA1c &lt; 7%  Coronary artery disease involving native coronary artery of native heart, angina presence unspecified: Coronary artery disease involving native coronary artery of native heart, angina presence unspecified  ACP (advance care planning): ACP (advance care planning)  CAD (coronary artery disease): CAD (coronary artery disease)  Constipation: Constipation  Anemia: Anemia Patient is a 49y old  Male who presents with a chief complaint of Chest Pain (05 Aug 2019 16:49)      INTERVAL HPI/OVERNIGHT EVENTS:    Patient is seen this Am , he has moved his BM X 2 .  Abd pain has improved but still present at time.  No fever , no chest pain .  NO bleeding as reported.        Medications:MEDICATIONS  (STANDING):  amiodarone    Tablet 100 milliGRAM(s) Oral <User Schedule>  apixaban 5 milliGRAM(s) Oral two times a day  aspirin enteric coated 81 milliGRAM(s) Oral daily  atorvastatin 80 milliGRAM(s) Oral at bedtime  clopidogrel Tablet 75 milliGRAM(s) Oral daily  dextrose 5%. 1000 milliLiter(s) (50 mL/Hr) IV Continuous <Continuous>  dextrose 50% Injectable 12.5 Gram(s) IV Push once  dextrose 50% Injectable 25 Gram(s) IV Push once  dextrose 50% Injectable 25 Gram(s) IV Push once  docusate sodium 100 milliGRAM(s) Oral three times a day  furosemide    Tablet 40 milliGRAM(s) Oral daily  insulin glargine Injectable (LANTUS) 24 Unit(s) SubCutaneous at bedtime  insulin lispro (HumaLOG) corrective regimen sliding scale   SubCutaneous three times a day before meals  insulin lispro (HumaLOG) corrective regimen sliding scale   SubCutaneous at bedtime  pantoprazole    Tablet 40 milliGRAM(s) Oral two times a day  polyethylene glycol 3350 17 Gram(s) Oral two times a day  potassium chloride    Tablet ER 20 milliEquivalent(s) Oral daily  senna 2 Tablet(s) Oral at bedtime  sucralfate 1 Gram(s) Oral four times a day  tamsulosin 0.4 milliGRAM(s) Oral at bedtime    MEDICATIONS  (PRN):  bisacodyl Suppository 10 milliGRAM(s) Rectal daily PRN Constipation  dextrose 40% Gel 15 Gram(s) Oral once PRN Blood Glucose LESS THAN 70 milliGRAM(s)/deciliter  glucagon  Injectable 1 milliGRAM(s) IntraMuscular once PRN Glucose LESS THAN 70 milligrams/deciliter  simethicone 80 milliGRAM(s) Chew four times a day PRN Upset Stomach  traMADol 25 milliGRAM(s) Oral every 12 hours PRN Moderate Pain (4 - 6)      Allergies: Allergies    fish (Hives)  No Known Drug Allergies        REVIEW OF SYSTEMS:  CONSTITUTIONAL: No fever, no bleeding   NECK: No pain or stiffness  RESPIRATORY: No cough, wheezing, chills or hemoptysis; No shortness of breath  CARDIOVASCULAR: No chest pain, palpitations, dizziness, or leg swelling  GASTROINTESTINAL: pos  abdominal  pain. No nausea, vomiting, or hematemesis; patient moved BM today   GENITOURINARY: No dysuria  NEUROLOGICAL: No headaches      T(C): 37.1 (08-06-19 @ 09:22), Max: 37.1 (08-06-19 @ 09:22)  HR: 62 (08-06-19 @ 09:22) (60 - 71)  BP: 122/72 (08-06-19 @ 09:22) (112/65 - 129/74)  RR: 18 (08-06-19 @ 09:22) (16 - 18)  SpO2: 94% (08-06-19 @ 09:22) (94% - 98%)  Wt(kg): --Vital Signs Last 24 Hrs  T(C): 37.1 (06 Aug 2019 09:22), Max: 37.1 (06 Aug 2019 09:22)  T(F): 98.7 (06 Aug 2019 09:22), Max: 98.7 (06 Aug 2019 09:22)  HR: 62 (06 Aug 2019 09:22) (60 - 71)  BP: 122/72 (06 Aug 2019 09:22) (112/65 - 129/74)  BP(mean): --  RR: 18 (06 Aug 2019 09:22) (16 - 18)  SpO2: 94% (06 Aug 2019 09:22) (94% - 98%)  I&O's Summary    05 Aug 2019 07:01  -  06 Aug 2019 07:00  --------------------------------------------------------  IN: 355 mL / OUT: 3200 mL / NET: -2845 mL        PHYSICAL EXAM:  GENERAL: NAD, well-groomed, well-developed  HEAD:  Atraumatic, Normocephalic  NECK: Supple, No JVD, Normal thyroid  NERVOUS SYSTEM:  Alert & Oriented X3, Good concentration  CHEST/LUNG: Clear to percussion bilaterally; No rales, rhonchi, wheezing, or rubs  HEART: Regular rate and rhythm; No murmurs, rubs, or gallops  ABDOMEN: Soft, Nontender, Nondistended; Bowel sounds present, pos chronic umbilical hernia , non incarcerated.    EXTREMITIES:  2+ Peripheral Pulses, No clubbing, cyanosis, or edema/ Left foot with clean wound     Consultant(s) Notes Reviewed:  [x ] YES  [ ] NO  Care Discussed with Consultants/Other Providers [ x] YES  [ ] NO  Name of Consultant  LABS:                        10.5   9.1   )-----------( 360      ( 05 Aug 2019 15:31 )             32.0     08-05    137  |  97  |  20  ----------------------------<  98  4.1   |  25  |  1.06    Ca    9.8      05 Aug 2019 15:31  Phos  3.8     08-05  Mg     2.1     08-05          CAPILLARY BLOOD GLUCOSE      POCT Blood Glucose.: 165 mg/dL (06 Aug 2019 08:31)  POCT Blood Glucose.: 208 mg/dL (05 Aug 2019 21:26)  POCT Blood Glucose.: 152 mg/dL (05 Aug 2019 18:11)  POCT Blood Glucose.: 103 mg/dL (05 Aug 2019 15:35)  POCT Blood Glucose.: 108 mg/dL (05 Aug 2019 13:06)            RADIOLOGY & ADDITIONAL TESTS:  EKG :     Imaging Personally Reviewed:  [ ] YES  [ ] NO  HEALTH ISSUES - PROBLEM Dx:  Anemia, unspecified type: Anemia, unspecified type  PAF (paroxysmal atrial fibrillation): PAF (paroxysmal atrial fibrillation)  Urinary retention: Urinary retention  Chronic gastric ulcer with perforation: Chronic gastric ulcer with perforation  Essential hypertension: Essential hypertension  Lower abdominal pain: Lower abdominal pain  Chronic pulmonary embolism without acute cor pulmonale, unspecified pulmonary embolism type: Chronic pulmonary embolism without acute cor pulmonale, unspecified pulmonary embolism type  DM type 2, goal HbA1c < 7%: DM type 2, goal HbA1c &lt; 7%  Coronary artery disease involving native coronary artery of native heart, angina presence unspecified: Coronary artery disease involving native coronary artery of native heart, angina presence unspecified  ACP (advance care planning): ACP (advance care planning)  CAD (coronary artery disease): CAD (coronary artery disease)  Constipation: Constipation  Anemia: Anemia

## 2019-08-06 NOTE — PROGRESS NOTE ADULT - SUBJECTIVE AND OBJECTIVE BOX
[facility] - Bolivar Carbajal, El, Brittany, Zahra, Román Jacobs  Office Number:  [phone]    Patient resting comfortably in bed in NAD.  Laying flat with no respiratory distress.  No complaints of chest pain, dyspnea, palpitations, PND, or orthopnea.    F/U for:  AF, CAD      MEDICATIONS  (STANDING):  amiodarone    Tablet 100 milliGRAM(s) Oral <User Schedule>  apixaban 5 milliGRAM(s) Oral two times a day  aspirin enteric coated 81 milliGRAM(s) Oral daily  atorvastatin 80 milliGRAM(s) Oral at bedtime  clopidogrel Tablet 75 milliGRAM(s) Oral daily  dextrose 5%. 1000 milliLiter(s) (50 mL/Hr) IV Continuous <Continuous>  dextrose 50% Injectable 12.5 Gram(s) IV Push once  dextrose 50% Injectable 25 Gram(s) IV Push once  dextrose 50% Injectable 25 Gram(s) IV Push once  docusate sodium 100 milliGRAM(s) Oral three times a day  furosemide    Tablet 40 milliGRAM(s) Oral daily  insulin glargine Injectable (LANTUS) 24 Unit(s) SubCutaneous at bedtime  insulin lispro (HumaLOG) corrective regimen sliding scale   SubCutaneous three times a day before meals  insulin lispro (HumaLOG) corrective regimen sliding scale   SubCutaneous at bedtime  pantoprazole    Tablet 40 milliGRAM(s) Oral two times a day  polyethylene glycol 3350 17 Gram(s) Oral two times a day  potassium chloride    Tablet ER 20 milliEquivalent(s) Oral daily  senna 2 Tablet(s) Oral at bedtime  sucralfate 1 Gram(s) Oral four times a day  tamsulosin 0.4 milliGRAM(s) Oral at bedtime    MEDICATIONS  (PRN):  dextrose 40% Gel 15 Gram(s) Oral once PRN Blood Glucose LESS THAN 70 milliGRAM(s)/deciliter  glucagon  Injectable 1 milliGRAM(s) IntraMuscular once PRN Glucose LESS THAN 70 milligrams/deciliter  simethicone 80 milliGRAM(s) Chew four times a day PRN Upset Stomach  traMADol 25 milliGRAM(s) Oral every 12 hours PRN Moderate Pain (4 - 6)      Allergies    fish (Hives)  No Known Drug Allergies    Intolerances        Vital Signs Last 24 Hrs  T(C): 36.8 (06 Aug 2019 05:35), Max: 36.8 (05 Aug 2019 19:50)  T(F): 98.3 (06 Aug 2019 05:35), Max: 98.3 (06 Aug 2019 00:23)  HR: 64 (06 Aug 2019 05:35) (56 - 71)  BP: 129/74 (06 Aug 2019 05:33) (112/65 - 129/74)  BP(mean): --  RR: 18 (06 Aug 2019 05:35) (16 - 18)  SpO2: 97% (06 Aug 2019 05:35) (96% - 98%)    I&O's Summary    05 Aug 2019 07:01  -  06 Aug 2019 07:00  --------------------------------------------------------  IN: 355 mL / OUT: 3200 mL / NET: -2845 mL        ON EXAM:    Constitutional: NAD, alert and oriented x 3  Lungs:  Non-labored, breath sounds are clear bilaterally, No wheezing, rales or rhonchi  Cardiovascular: bradycardic  S1 and S2 positive.  No murmurs, rubs, gallops or clicks  Gastrointestinal: Bowel Sounds present, soft, nontender.   Lymph: No peripheral edema. No cervical lymphadenopathy.  Neurological: Alert, no focal deficits  Skin: No rashes or ulcers   Psych:  Mood & affect appropriate.    LABS: All Labs Reviewed:                        10.5   9.1   )-----------( 360      ( 05 Aug 2019 15:31 )             32.0                         9.7    8.6   )-----------( 321      ( 04 Aug 2019 06:12 )             30.4                         9.6    8.3   )-----------( 310      ( 04 Aug 2019 00:29 )             29.9     05 Aug 2019 15:31    137    |  97     |  20     ----------------------------<  98     4.1     |  25     |  1.06   04 Aug 2019 06:12    137    |  100    |  17     ----------------------------<  137    3.9     |  24     |  1.20     Ca    9.8        05 Aug 2019 15:31  Ca    9.5        04 Aug 2019 06:12  Phos  3.8       05 Aug 2019 15:31  Mg     2.1       05 Aug 2019 15:31    TPro  7.2    /  Alb  3.5    /  TBili  0.2    /  DBili  x      /  AST  11     /  ALT  10     /  AlkPhos  129    04 Aug 2019 06:12

## 2019-08-06 NOTE — CHART NOTE - NSCHARTNOTEFT_GEN_A_CORE
SARAH MORRISON  49y Male  Patient is a 49y old  Male who presents with a chief complaint of Chest Pain (05 Aug 2019 16:49)     Event Summary:  Pt with recurring urinary retention, had jacques catheter in place x 48 hours, failed TOV yesterday, requiring jacques catheter placement again last night, with good urinary output at this time. US ABD with no hydronephrosis, no OIB on labs.  -Consulted case with [facility] in preparation for discharge, agrees patient should be discharged home with jacques catheter in place, continue with flomax 0.4mg HS, and bowel regimen, and to have patient follow-up at [facility] next week to attempt TOV in office.  -Patient cleared for discharge home today, made aware of  follow-up next week for TOV.     Nathaniel Saintus [facility]  #[phone]

## 2019-08-06 NOTE — PROGRESS NOTE ADULT - ASSESSMENT
Mr. Kenney is a 49 M with PAF on eliquis, RUL subsegmental PE, hx of recent NSTEMI, hx of perforated antral ulcer, osteomyelitis s/p debridement,  HTN and DM2 presenting to [facility] on 7/31/19 complaining of chest pain.   He is now s/p cath with mild LAD disease, 90% RCA and 100% RPL. He is now s/p BMS to RCA    - He is chest pain free this morning. He did have a reported bloody BM on Friday and hematuria on Saturday. He reports significant constipation over last 48 hours. He has not had a BM  - He will be on triple therapy for 1 week with ASA, Plavix and Eliquis, until Saturday. We will then d/c asa and continue plavix with eliquis for the next 3 weeks. Monitor closely for bleeding. hb is stable.  - Sinus bradycardia on telemetry, though no additional AF. Continue with Amiodarone with hold parameters  - BB has been held in the setting of bradycardia  - continue with statin  - Euvolemic on exam. Continue with Po lasix.  - GI follow up for constipation and abdominal pain.  - Watch creatinine and electrolytes. Keep K>4, Mg>2  - The importance of strict follow up has been stressed.  - Will follow with you.

## 2019-08-06 NOTE — PROGRESS NOTE ADULT - SUBJECTIVE AND OBJECTIVE BOX
INTERVAL HPI/OVERNIGHT EVENTS:    pt seen and examined  no new GI events/complaints    MEDICATIONS  (STANDING):  amiodarone    Tablet 100 milliGRAM(s) Oral <User Schedule>  apixaban 5 milliGRAM(s) Oral two times a day  aspirin enteric coated 81 milliGRAM(s) Oral daily  atorvastatin 80 milliGRAM(s) Oral at bedtime  clopidogrel Tablet 75 milliGRAM(s) Oral daily  dextrose 5%. 1000 milliLiter(s) (50 mL/Hr) IV Continuous <Continuous>  dextrose 50% Injectable 12.5 Gram(s) IV Push once  dextrose 50% Injectable 25 Gram(s) IV Push once  dextrose 50% Injectable 25 Gram(s) IV Push once  docusate sodium 100 milliGRAM(s) Oral three times a day  furosemide    Tablet 40 milliGRAM(s) Oral daily  insulin glargine Injectable (LANTUS) 24 Unit(s) SubCutaneous at bedtime  insulin lispro (HumaLOG) corrective regimen sliding scale   SubCutaneous three times a day before meals  insulin lispro (HumaLOG) corrective regimen sliding scale   SubCutaneous at bedtime  pantoprazole    Tablet 40 milliGRAM(s) Oral two times a day  polyethylene glycol 3350 17 Gram(s) Oral two times a day  potassium chloride    Tablet ER 20 milliEquivalent(s) Oral daily  senna 2 Tablet(s) Oral at bedtime  sucralfate 1 Gram(s) Oral four times a day  tamsulosin 0.4 milliGRAM(s) Oral at bedtime    MEDICATIONS  (PRN):  bisacodyl Suppository 10 milliGRAM(s) Rectal daily PRN Constipation  dextrose 40% Gel 15 Gram(s) Oral once PRN Blood Glucose LESS THAN 70 milliGRAM(s)/deciliter  glucagon  Injectable 1 milliGRAM(s) IntraMuscular once PRN Glucose LESS THAN 70 milligrams/deciliter  simethicone 80 milliGRAM(s) Chew four times a day PRN Upset Stomach  traMADol 25 milliGRAM(s) Oral every 12 hours PRN Moderate Pain (4 - 6)      Allergies    fish (Hives)  No Known Drug Allergies    Intolerances        Review of Systems:    General:  No wt loss, fevers, chills, night sweats,fatigue,   Eyes:  Good vision, no reported pain  ENT:  No sore throat, pain, runny nose, dysphagia  CV:  No pain, palpitations, hypo/hypertension  Resp:  No dyspnea, cough, tachypnea, wheezing  GI:  No pain, No nausea, No vomiting, No diarrhea, No constipation, No weight loss, No fever, No pruritis, No rectal bleeding, No melena, No dysphagia  :  No pain, bleeding, incontinence, nocturia  Muscle:  No pain, weakness  Neuro:  No weakness, tingling, memory problems  Psych:  No fatigue, insomnia, mood problems, depression  Endocrine:  No polyuria, polydypsia, cold/heat intolerance  Heme:  No petechiae, ecchymosis, easy bruisability  Skin:  No rash, tattoos, scars, edema      Vital Signs Last 24 Hrs  T(C): 36.4 (06 Aug 2019 12:46), Max: 37.1 (06 Aug 2019 09:22)  T(F): 97.6 (06 Aug 2019 12:46), Max: 98.7 (06 Aug 2019 09:22)  HR: 57 (06 Aug 2019 12:46) (57 - 71)  BP: 126/71 (06 Aug 2019 12:46) (112/65 - 129/74)  BP(mean): --  RR: 18 (06 Aug 2019 12:46) (16 - 18)  SpO2: 96% (06 Aug 2019 12:46) (94% - 98%)    PHYSICAL EXAM:    Constitutional: NAD, well-developed  HEENT: EOMI, throat clear  Neck: No LAD, supple  Respiratory: CTA and P  Cardiovascular: S1 and S2, RRR, no M  Gastrointestinal: BS+, soft, NT/ND, neg HSM,  Extremities: No peripheral edema, neg clubing, cyanosis  Vascular: 2+ peripheral pulses  Neurological: A/O x 3, no focal deficits  Psychiatric: Normal mood, normal affect  Skin: No rashes      LABS:                        10.5   9.1   )-----------( 360      ( 05 Aug 2019 15:31 )             32.0     08-05    137  |  97  |  20  ----------------------------<  98  4.1   |  25  |  1.06    Ca    9.8      05 Aug 2019 15:31  Phos  3.8     08-05  Mg     2.1     08-05            RADIOLOGY & ADDITIONAL TESTS:

## 2019-08-06 NOTE — DISCHARGE NOTE NURSING/CASE MANAGEMENT/SOCIAL WORK - NSDCFUADDAPPT_GEN_ALL_CORE_FT
FOLLOW-UP NEXT WEEK WITH UROLOGY TO EVALUATE REMOVING RODRIGUEZ CATHETER  [facility]  Urologist in [place], [place]  Address: [address] [place] [address], [place] [address]  Phone: [phone]

## 2019-08-06 NOTE — PROGRESS NOTE ADULT - ASSESSMENT
49M with PMH of PAF on eliquis, RUL subsegmental PE, hx of recent NSTEMI, hx of perforated antral ulcer (S/P  emergent ex-lap omentopexy and plication in 5/2019) , osteomyelitis s/p debridement,  HTN and DM2 presented to the ED at [place] on 7/31/19 complaining of chest pain.  On 8/1/19, patient had diagnostic  LHC revealing, RCA 90%; %.  He is now s/p BMS to RCA.   Patient was seen by GI and was cleared for Eliquis/Plavix and ASA.  NOw with lower abdominal pain for many weeks of unclear etiology.

## 2019-08-07 LAB
GLUCOSE BLDC GLUCOMTR-MCNC: 127 MG/DL — HIGH (ref 70–99)
GLUCOSE BLDC GLUCOMTR-MCNC: 191 MG/DL — HIGH (ref 70–99)
GLUCOSE BLDC GLUCOMTR-MCNC: 203 MG/DL — HIGH (ref 70–99)
GLUCOSE BLDC GLUCOMTR-MCNC: 221 MG/DL — HIGH (ref 70–99)
GLUCOSE BLDC GLUCOMTR-MCNC: 251 MG/DL — HIGH (ref 70–99)
HCT VFR BLD CALC: 28.7 % — LOW (ref 39–50)
HGB BLD-MCNC: 9.3 G/DL — LOW (ref 13–17)
MCHC RBC-ENTMCNC: 28.9 PG — SIGNIFICANT CHANGE UP (ref 27–34)
MCHC RBC-ENTMCNC: 32.4 GM/DL — SIGNIFICANT CHANGE UP (ref 32–36)
MCV RBC AUTO: 89.1 FL — SIGNIFICANT CHANGE UP (ref 80–100)
PLATELET # BLD AUTO: 320 K/UL — SIGNIFICANT CHANGE UP (ref 150–400)
RBC # BLD: 3.22 M/UL — LOW (ref 4.2–5.8)
RBC # FLD: 12.8 % — SIGNIFICANT CHANGE UP (ref 10.3–14.5)
WBC # BLD: 9.6 K/UL — SIGNIFICANT CHANGE UP (ref 3.8–10.5)
WBC # FLD AUTO: 9.6 K/UL — SIGNIFICANT CHANGE UP (ref 3.8–10.5)

## 2019-08-07 PROCEDURE — 99232 SBSQ HOSP IP/OBS MODERATE 35: CPT

## 2019-08-07 RX ADMIN — APIXABAN 5 MILLIGRAM(S): 2.5 TABLET, FILM COATED ORAL at 11:00

## 2019-08-07 RX ADMIN — PANTOPRAZOLE SODIUM 40 MILLIGRAM(S): 20 TABLET, DELAYED RELEASE ORAL at 05:45

## 2019-08-07 RX ADMIN — Medication 1 GRAM(S): at 17:38

## 2019-08-07 RX ADMIN — Medication 100 MILLIGRAM(S): at 22:05

## 2019-08-07 RX ADMIN — Medication 100 MILLIGRAM(S): at 05:45

## 2019-08-07 RX ADMIN — Medication 40 MILLIGRAM(S): at 05:45

## 2019-08-07 RX ADMIN — POLYETHYLENE GLYCOL 3350 17 GRAM(S): 17 POWDER, FOR SOLUTION ORAL at 17:38

## 2019-08-07 RX ADMIN — TRAMADOL HYDROCHLORIDE 25 MILLIGRAM(S): 50 TABLET ORAL at 11:03

## 2019-08-07 RX ADMIN — Medication 1 GRAM(S): at 05:45

## 2019-08-07 RX ADMIN — POLYETHYLENE GLYCOL 3350 17 GRAM(S): 17 POWDER, FOR SOLUTION ORAL at 05:45

## 2019-08-07 RX ADMIN — Medication 1 GRAM(S): at 01:42

## 2019-08-07 RX ADMIN — Medication 2: at 19:22

## 2019-08-07 RX ADMIN — PANTOPRAZOLE SODIUM 40 MILLIGRAM(S): 20 TABLET, DELAYED RELEASE ORAL at 17:38

## 2019-08-07 RX ADMIN — TRAMADOL HYDROCHLORIDE 25 MILLIGRAM(S): 50 TABLET ORAL at 11:33

## 2019-08-07 RX ADMIN — Medication 20 MILLIEQUIVALENT(S): at 11:01

## 2019-08-07 RX ADMIN — Medication 1 GRAM(S): at 11:02

## 2019-08-07 RX ADMIN — ATORVASTATIN CALCIUM 80 MILLIGRAM(S): 80 TABLET, FILM COATED ORAL at 22:05

## 2019-08-07 RX ADMIN — CLOPIDOGREL BISULFATE 75 MILLIGRAM(S): 75 TABLET, FILM COATED ORAL at 11:01

## 2019-08-07 RX ADMIN — APIXABAN 5 MILLIGRAM(S): 2.5 TABLET, FILM COATED ORAL at 22:05

## 2019-08-07 RX ADMIN — Medication 2: at 14:48

## 2019-08-07 RX ADMIN — AMIODARONE HYDROCHLORIDE 100 MILLIGRAM(S): 400 TABLET ORAL at 11:00

## 2019-08-07 RX ADMIN — SIMETHICONE 80 MILLIGRAM(S): 80 TABLET, CHEWABLE ORAL at 22:05

## 2019-08-07 RX ADMIN — Medication 81 MILLIGRAM(S): at 11:01

## 2019-08-07 RX ADMIN — TAMSULOSIN HYDROCHLORIDE 0.4 MILLIGRAM(S): 0.4 CAPSULE ORAL at 22:05

## 2019-08-07 RX ADMIN — INSULIN GLARGINE 24 UNIT(S): 100 INJECTION, SOLUTION SUBCUTANEOUS at 22:05

## 2019-08-07 RX ADMIN — SENNA PLUS 2 TABLET(S): 8.6 TABLET ORAL at 22:05

## 2019-08-07 NOTE — PROGRESS NOTE ADULT - PROBLEM SELECTOR PLAN 1
- continue ppi/carafate.   - Case was discussed with Dr Lopez on 8/7 , no plan for EGD or Colonoscopy since pt recently had cardiac stent , and patient is to f/up as outpatient with GI post dc. Since pt states to have black stool ,will get Occult testing and case was discussed with the GI NP today who states that a decision will be made based on the occult results   - will then rescind the DC order for today   - monitor cbc daily transfuse prbcs as needed  - no gi objection to triple therapy as noted

## 2019-08-07 NOTE — DIETITIAN INITIAL EVALUATION ADULT. - PERTINENT LABORATORY DATA
CAPILLARY BLOOD GLUCOSE      POCT Blood Glucose.: 203 mg/dL (07 Aug 2019 14:46)  POCT Blood Glucose.: 127 mg/dL (07 Aug 2019 09:27)  POCT Blood Glucose.: 232 mg/dL (06 Aug 2019 21:12)  POCT Blood Glucose.: 291 mg/dL (06 Aug 2019 18:28)

## 2019-08-07 NOTE — PROGRESS NOTE ADULT - SUBJECTIVE AND OBJECTIVE BOX
Patient is a 49y old  Male who presents with a chief complaint of Chest pain (06 Aug 2019 11:11)      INTERVAL HPI/OVERNIGHT EVENTS:    Patient states that he is always in pain and takes the Tramadol  to help with his sleep but does not ask for the tramadol for the pain .  NO BM when seen in AM.  Pt states that he had black stool yesterday and is not willing to go home if he is having black stool and wants GI to look into a diagnostic testing.          Medications:MEDICATIONS  (STANDING):  amiodarone    Tablet 100 milliGRAM(s) Oral <User Schedule>  apixaban 5 milliGRAM(s) Oral two times a day  aspirin enteric coated 81 milliGRAM(s) Oral daily  atorvastatin 80 milliGRAM(s) Oral at bedtime  clopidogrel Tablet 75 milliGRAM(s) Oral daily  dextrose 5%. 1000 milliLiter(s) (50 mL/Hr) IV Continuous <Continuous>  dextrose 50% Injectable 12.5 Gram(s) IV Push once  dextrose 50% Injectable 25 Gram(s) IV Push once  dextrose 50% Injectable 25 Gram(s) IV Push once  docusate sodium 100 milliGRAM(s) Oral three times a day  furosemide    Tablet 40 milliGRAM(s) Oral daily  insulin glargine Injectable (LANTUS) 24 Unit(s) SubCutaneous at bedtime  insulin lispro (HumaLOG) corrective regimen sliding scale   SubCutaneous three times a day before meals  insulin lispro (HumaLOG) corrective regimen sliding scale   SubCutaneous at bedtime  pantoprazole    Tablet 40 milliGRAM(s) Oral two times a day  polyethylene glycol 3350 17 Gram(s) Oral two times a day  potassium chloride    Tablet ER 20 milliEquivalent(s) Oral daily  senna 2 Tablet(s) Oral at bedtime  sucralfate 1 Gram(s) Oral four times a day  tamsulosin 0.4 milliGRAM(s) Oral at bedtime    MEDICATIONS  (PRN):  bisacodyl Suppository 10 milliGRAM(s) Rectal daily PRN Constipation  dextrose 40% Gel 15 Gram(s) Oral once PRN Blood Glucose LESS THAN 70 milliGRAM(s)/deciliter  glucagon  Injectable 1 milliGRAM(s) IntraMuscular once PRN Glucose LESS THAN 70 milligrams/deciliter  simethicone 80 milliGRAM(s) Chew four times a day PRN Upset Stomach  traMADol 25 milliGRAM(s) Oral every 12 hours PRN Moderate Pain (4 - 6)      Allergies: Allergies    fish (Hives)  No Known Drug Allergies        REVIEW OF SYSTEMS:  CONSTITUTIONAL: No fever, no bleeding   NECK: No pain or stiffness  RESPIRATORY: No cough, wheezing, chills or hemoptysis; No shortness of breath  CARDIOVASCULAR: No chest pain, palpitations, dizziness, or leg swelling  GASTROINTESTINAL: pos  abdominal  pain. No nausea, vomiting, or hematemesis; patient moved BM today   GENITOURINARY: No dysuria  NEUROLOGICAL: No headaches    T(C): 36.7 (08-07-19 @ 10:02), Max: 36.8 (08-06-19 @ 20:11)  HR: 78 (08-07-19 @ 10:02) (62 - 78)  BP: 125/78 (08-07-19 @ 10:02) (122/687 - 142/74)  RR: 18 (08-07-19 @ 10:02) (18 - 18)  SpO2: 96% (08-07-19 @ 10:02) (96% - 100%)  Wt(kg): --Vital Signs Last 24 Hrs  T(C): 36.7 (07 Aug 2019 10:02), Max: 36.8 (06 Aug 2019 20:11)  T(F): 98.1 (07 Aug 2019 10:02), Max: 98.3 (07 Aug 2019 00:07)  HR: 78 (07 Aug 2019 10:02) (62 - 78)  BP: 125/78 (07 Aug 2019 10:02) (122/687 - 142/74)  BP(mean): --  RR: 18 (07 Aug 2019 10:02) (18 - 18)  SpO2: 96% (07 Aug 2019 10:02) (96% - 100%)  I&O's Summary    06 Aug 2019 07:01  -  07 Aug 2019 07:00  --------------------------------------------------------  IN: 120 mL / OUT: 1700 mL / NET: -1580 mL        PHYSICAL EXAM:  GENERAL: NAD, well-groomed, well-developed  HEAD:  Atraumatic, Normocephalic  NECK: Supple, No JVD, Normal thyroid  NERVOUS SYSTEM:  Alert & Oriented X3, Good concentration  CHEST/LUNG: Clear to percussion bilaterally; No rales, rhonchi, wheezing, or rubs  HEART: Regular rate and rhythm; No murmurs, rubs, or gallops  ABDOMEN: Soft, Nontender, Nondistended; Bowel sounds present, pos chronic umbilical hernia , non incarcerated.    EXTREMITIES:  2+ Peripheral Pulses, No clubbing, cyanosis, or edema/ Left foot with clean wound       Consultant(s) Notes Reviewed:  [x ] YES  [ ] NO  Care Discussed with Consultants/Other Providers [ x] YES  [ ] NO  Name of Consultant  LABS:                        9.3    9.6   )-----------( 320      ( 07 Aug 2019 06:03 )             28.7               CAPILLARY BLOOD GLUCOSE      POCT Blood Glucose.: 203 mg/dL (07 Aug 2019 14:46)  POCT Blood Glucose.: 127 mg/dL (07 Aug 2019 09:27)  POCT Blood Glucose.: 232 mg/dL (06 Aug 2019 21:12)  POCT Blood Glucose.: 291 mg/dL (06 Aug 2019 18:28)            RADIOLOGY & ADDITIONAL TESTS:  EKG :     Imaging Personally Reviewed:  [ ] YES  [ ] NO  HEALTH ISSUES - PROBLEM Dx:  Anemia, unspecified type: Anemia, unspecified type  PAF (paroxysmal atrial fibrillation): PAF (paroxysmal atrial fibrillation)  Urinary retention: Urinary retention  Chronic gastric ulcer with perforation: Chronic gastric ulcer with perforation  Essential hypertension: Essential hypertension  Lower abdominal pain: Lower abdominal pain  Chronic pulmonary embolism without acute cor pulmonale, unspecified pulmonary embolism type: Chronic pulmonary embolism without acute cor pulmonale, unspecified pulmonary embolism type  DM type 2, goal HbA1c < 7%: DM type 2, goal HbA1c &lt; 7%  Coronary artery disease involving native coronary artery of native heart, angina presence unspecified: Coronary artery disease involving native coronary artery of native heart, angina presence unspecified  ACP (advance care planning): ACP (advance care planning)  CAD (coronary artery disease): CAD (coronary artery disease)  Constipation: Constipation  Anemia: Anemia

## 2019-08-07 NOTE — PROGRESS NOTE ADULT - ASSESSMENT
49M with PMH of PAF on eliquis, RUL subsegmental PE, hx of recent NSTEMI, hx of perforated antral ulcer (S/P  emergent ex-lap omentopexy and plication in 5/2019) , osteomyelitis s/p debridement,  HTN and DM2 presented to the ED at [place] on 7/31/19 complaining of chest pain.  On 8/1/19, patient had diagnostic  LHC revealing, RCA 90%; %.  He is now s/p BMS to RCA.   Patient was seen by GI and was cleared for Eliquis/Plavix and ASA.  NOw with lower abdominal pain for many weeks of unclear etiology.

## 2019-08-07 NOTE — PROGRESS NOTE ADULT - SUBJECTIVE AND OBJECTIVE BOX
INTERVAL HPI/OVERNIGHT EVENTS:    pt seen and examined  pt reports dark stool, instructed to show RN after next bm   h/h remains stable  tolerating PO well, no n/v.     MEDICATIONS  (STANDING):  amiodarone    Tablet 100 milliGRAM(s) Oral <User Schedule>  apixaban 5 milliGRAM(s) Oral two times a day  aspirin enteric coated 81 milliGRAM(s) Oral daily  atorvastatin 80 milliGRAM(s) Oral at bedtime  clopidogrel Tablet 75 milliGRAM(s) Oral daily  dextrose 5%. 1000 milliLiter(s) (50 mL/Hr) IV Continuous <Continuous>  dextrose 50% Injectable 12.5 Gram(s) IV Push once  dextrose 50% Injectable 25 Gram(s) IV Push once  dextrose 50% Injectable 25 Gram(s) IV Push once  docusate sodium 100 milliGRAM(s) Oral three times a day  furosemide    Tablet 40 milliGRAM(s) Oral daily  insulin glargine Injectable (LANTUS) 24 Unit(s) SubCutaneous at bedtime  insulin lispro (HumaLOG) corrective regimen sliding scale   SubCutaneous three times a day before meals  insulin lispro (HumaLOG) corrective regimen sliding scale   SubCutaneous at bedtime  pantoprazole    Tablet 40 milliGRAM(s) Oral two times a day  polyethylene glycol 3350 17 Gram(s) Oral two times a day  potassium chloride    Tablet ER 20 milliEquivalent(s) Oral daily  senna 2 Tablet(s) Oral at bedtime  sucralfate 1 Gram(s) Oral four times a day  tamsulosin 0.4 milliGRAM(s) Oral at bedtime    MEDICATIONS  (PRN):  bisacodyl Suppository 10 milliGRAM(s) Rectal daily PRN Constipation  dextrose 40% Gel 15 Gram(s) Oral once PRN Blood Glucose LESS THAN 70 milliGRAM(s)/deciliter  glucagon  Injectable 1 milliGRAM(s) IntraMuscular once PRN Glucose LESS THAN 70 milligrams/deciliter  simethicone 80 milliGRAM(s) Chew four times a day PRN Upset Stomach  traMADol 25 milliGRAM(s) Oral every 12 hours PRN Moderate Pain (4 - 6)      Allergies    fish (Hives)  No Known Drug Allergies    Intolerances        Review of Systems:    General:  No wt loss, fevers, chills, night sweats,fatigue,   Eyes:  Good vision, no reported pain  ENT:  No sore throat, pain, runny nose, dysphagia  CV:  No pain, palpitations, hypo/hypertension  Resp:  No dyspnea, cough, tachypnea, wheezing  GI:  No pain, No nausea, No vomiting, No diarrhea, No constipation, No weight loss, No fever, No pruritis, No rectal bleeding, No melena, No dysphagia  :  No pain, bleeding, incontinence, nocturia  Muscle:  No pain, weakness  Neuro:  No weakness, tingling, memory problems  Psych:  No fatigue, insomnia, mood problems, depression  Endocrine:  No polyuria, polydypsia, cold/heat intolerance  Heme:  No petechiae, ecchymosis, easy bruisability  Skin:  No rash, tattoos, scars, edema      Vital Signs Last 24 Hrs  T(C): 36.7 (07 Aug 2019 10:02), Max: 36.8 (06 Aug 2019 20:11)  T(F): 98.1 (07 Aug 2019 10:02), Max: 98.3 (07 Aug 2019 00:07)  HR: 78 (07 Aug 2019 10:02) (57 - 78)  BP: 125/78 (07 Aug 2019 10:02) (122/687 - 142/74)  BP(mean): --  RR: 18 (07 Aug 2019 10:02) (18 - 18)  SpO2: 96% (07 Aug 2019 10:02) (96% - 100%)    PHYSICAL EXAM:    Constitutional: NAD, well-developed  HEENT: EOMI, throat clear  Neck: No LAD, supple  Respiratory: CTA and P  Cardiovascular: S1 and S2, RRR, no M  Gastrointestinal: BS+, soft, NT/ND, neg HSM,  Extremities: No peripheral edema, neg clubing, cyanosis  Vascular: 2+ peripheral pulses  Neurological: A/O x 3, no focal deficits  Psychiatric: Normal mood, normal affect  Skin: No rashes      LABS:                        9.3    9.6   )-----------( 320      ( 07 Aug 2019 06:03 )             28.7     08-05    137  |  97  |  20  ----------------------------<  98  4.1   |  25  |  1.06    Ca    9.8      05 Aug 2019 15:31  Phos  3.8     08-05  Mg     2.1     08-05            RADIOLOGY & ADDITIONAL TESTS:

## 2019-08-07 NOTE — DIETITIAN INITIAL EVALUATION ADULT. - PERTINENT MEDS FT
MEDICATIONS  (STANDING):  amiodarone    Tablet 100 milliGRAM(s) Oral <User Schedule>  apixaban 5 milliGRAM(s) Oral two times a day  aspirin enteric coated 81 milliGRAM(s) Oral daily  atorvastatin 80 milliGRAM(s) Oral at bedtime  clopidogrel Tablet 75 milliGRAM(s) Oral daily  dextrose 5%. 1000 milliLiter(s) (50 mL/Hr) IV Continuous <Continuous>  dextrose 50% Injectable 12.5 Gram(s) IV Push once  dextrose 50% Injectable 25 Gram(s) IV Push once  dextrose 50% Injectable 25 Gram(s) IV Push once  docusate sodium 100 milliGRAM(s) Oral three times a day  furosemide    Tablet 40 milliGRAM(s) Oral daily  insulin glargine Injectable (LANTUS) 24 Unit(s) SubCutaneous at bedtime  insulin lispro (HumaLOG) corrective regimen sliding scale   SubCutaneous three times a day before meals  insulin lispro (HumaLOG) corrective regimen sliding scale   SubCutaneous at bedtime  pantoprazole    Tablet 40 milliGRAM(s) Oral two times a day  polyethylene glycol 3350 17 Gram(s) Oral two times a day  potassium chloride    Tablet ER 20 milliEquivalent(s) Oral daily  senna 2 Tablet(s) Oral at bedtime  sucralfate 1 Gram(s) Oral four times a day  tamsulosin 0.4 milliGRAM(s) Oral at bedtime    MEDICATIONS  (PRN):  bisacodyl Suppository 10 milliGRAM(s) Rectal daily PRN Constipation  dextrose 40% Gel 15 Gram(s) Oral once PRN Blood Glucose LESS THAN 70 milliGRAM(s)/deciliter  glucagon  Injectable 1 milliGRAM(s) IntraMuscular once PRN Glucose LESS THAN 70 milligrams/deciliter  simethicone 80 milliGRAM(s) Chew four times a day PRN Upset Stomach  traMADol 25 milliGRAM(s) Oral every 12 hours PRN Moderate Pain (4 - 6)

## 2019-08-07 NOTE — DIETITIAN INITIAL EVALUATION ADULT. - OTHER INFO
Pt reported good appetite and po intake PTA. Reportedly tried to follow a DM diet, checked his FS 3-4x/d, BG ranged from 120-200 mg/dL. Pt reported compliant to insulin regimen. Stated  kg/220# x 2 months ago, admit wt 88.5 kg/195#, 11% wt loss due to previous hospital course. Pt stated good appetite and po intake since admission, consuming 100% of meals, tolerating diet, last BM today. Denied any chewing or swallowing difficulties. Confirmed fish allergy. No pressure ulcers noted. No edema noted

## 2019-08-07 NOTE — PROGRESS NOTE ADULT - SUBJECTIVE AND OBJECTIVE BOX
[facility] -- Bolivar Carbajal, Brittany Gallegos Pannella, Patel, Savella  Office # [phone]      Follow Up:  CAD, GIB    Subjective/Observations: Patient seen and examined. Events noted. Resting comfortably in bed. No complaints of chest pain, dyspnea, or palpitations reported. No signs of orthopnea or PND. Still with abd pain. reported maroon stools yest      REVIEW OF SYSTEMS: All other review of systems is negative unless indicated above    PAST MEDICAL & SURGICAL HISTORY:  Osteomyelitis: s/p debridement  History of non-ST elevation myocardial infarction (NSTEMI)  Pulmonary embolism  Perforated gastric ulcer: s/p emergent ex-lap omentopexy and plication 6/2019  BPH (benign prostatic hyperplasia)  Hypertension  Afib  Diabetes mellitus with no complication  Diabetes  Traumatic amputation of left foot, initial encounter  Perforated gastric ulcer  H/O abdominal surgery      MEDICATIONS  (STANDING):  amiodarone    Tablet 100 milliGRAM(s) Oral <User Schedule>  apixaban 5 milliGRAM(s) Oral two times a day  aspirin enteric coated 81 milliGRAM(s) Oral daily  atorvastatin 80 milliGRAM(s) Oral at bedtime  clopidogrel Tablet 75 milliGRAM(s) Oral daily  dextrose 5%. 1000 milliLiter(s) (50 mL/Hr) IV Continuous <Continuous>  dextrose 50% Injectable 12.5 Gram(s) IV Push once  dextrose 50% Injectable 25 Gram(s) IV Push once  dextrose 50% Injectable 25 Gram(s) IV Push once  docusate sodium 100 milliGRAM(s) Oral three times a day  furosemide    Tablet 40 milliGRAM(s) Oral daily  insulin glargine Injectable (LANTUS) 24 Unit(s) SubCutaneous at bedtime  insulin lispro (HumaLOG) corrective regimen sliding scale   SubCutaneous three times a day before meals  insulin lispro (HumaLOG) corrective regimen sliding scale   SubCutaneous at bedtime  pantoprazole    Tablet 40 milliGRAM(s) Oral two times a day  polyethylene glycol 3350 17 Gram(s) Oral two times a day  potassium chloride    Tablet ER 20 milliEquivalent(s) Oral daily  senna 2 Tablet(s) Oral at bedtime  sucralfate 1 Gram(s) Oral four times a day  tamsulosin 0.4 milliGRAM(s) Oral at bedtime    MEDICATIONS  (PRN):  bisacodyl Suppository 10 milliGRAM(s) Rectal daily PRN Constipation  dextrose 40% Gel 15 Gram(s) Oral once PRN Blood Glucose LESS THAN 70 milliGRAM(s)/deciliter  glucagon  Injectable 1 milliGRAM(s) IntraMuscular once PRN Glucose LESS THAN 70 milligrams/deciliter  simethicone 80 milliGRAM(s) Chew four times a day PRN Upset Stomach  traMADol 25 milliGRAM(s) Oral every 12 hours PRN Moderate Pain (4 - 6)      Allergies    fish (Hives)  No Known Drug Allergies    Intolerances            Vital Signs Last 24 Hrs  T(C): 36.8 (07 Aug 2019 04:40), Max: 37.1 (06 Aug 2019 09:22)  T(F): 98.3 (07 Aug 2019 04:40), Max: 98.7 (06 Aug 2019 09:22)  HR: 66 (07 Aug 2019 04:40) (57 - 66)  BP: 122/687 (07 Aug 2019 04:40) (122/72 - 142/74)  BP(mean): --  RR: 18 (07 Aug 2019 04:40) (18 - 18)  SpO2: 96% (07 Aug 2019 04:40) (94% - 100%)    I&O's Summary    06 Aug 2019 07:01  -  07 Aug 2019 07:00  --------------------------------------------------------  IN: 120 mL / OUT: 1700 mL / NET: -1580 mL          PHYSICAL EXAM:  TELE:    Constitutional: NAD, awake    HEENT: Moist Mucous Membranes, Anicteric  Pulmonary: Decreased breath sounds b/l. No rales, crackles or wheeze appreciated.   Cardiovascular: Regular, S1 and S2, No murmurs, rubs, gallops or clicks  Gastrointestinal: Bowel Sounds present, soft, nontender.   Lymph: No peripheral edema. No lymphadenopathy.  Skin: No visible rashes or ulcers.  Psych:  Mood & affect appropriate for situation    LABS: All Labs Reviewed:                        9.3    9.6   )-----------( 320      ( 07 Aug 2019 06:03 )             28.7                         10.5   9.1   )-----------( 360      ( 05 Aug 2019 15:31 )             32.0     05 Aug 2019 15:31    137    |  97     |  20     ----------------------------<  98     4.1     |  25     |  1.06     Ca    9.8        05 Aug 2019 15:31  Phos  3.8       05 Aug 2019 15:31  Mg     2.1       05 Aug 2019 15:31

## 2019-08-07 NOTE — PROGRESS NOTE ADULT - ASSESSMENT
Mr. Kenney is a 49 M with PAF on eliquis, RUL subsegmental PE, hx of recent NSTEMI, hx of perforated antral ulcer, osteomyelitis s/p debridement,  HTN and DM2 presenting to [place] on 7/31/19 complaining of chest pain.   He is now s/p cath with mild LAD disease, 90% RCA and 100% RPL. He is now s/p BMS to RCA    - He is chest pain free this morning.   - He will be on triple therapy for 1 week with ASA, Plavix and Eliquis, until Saturday. We will then d/c asa and continue plavix with eliquis for the next 3 weeks.     - Stil with abd pain  - Had a maroon BM yest. GI f/u  - Monitor closely for bleeding. trend H/H    - Sinus bradycardia on telemetry, though no additional AF. Continue with Amiodarone with hold parameters  - BB has been held in the setting of bradycardia    - continue with statin  - Euvolemic on exam. Continue with Po lasix.  - GI follow up for constipation and abdominal pain.  - Watch creatinine and electrolytes. Keep K>4, Mg>2  - The importance of strict follow up has been stressed.  - Will follow with you.

## 2019-08-08 DIAGNOSIS — K59.00 CONSTIPATION, UNSPECIFIED: ICD-10-CM

## 2019-08-08 LAB
GLUCOSE BLDC GLUCOMTR-MCNC: 111 MG/DL — HIGH (ref 70–99)
GLUCOSE BLDC GLUCOMTR-MCNC: 223 MG/DL — HIGH (ref 70–99)
GLUCOSE BLDC GLUCOMTR-MCNC: 226 MG/DL — HIGH (ref 70–99)
GLUCOSE BLDC GLUCOMTR-MCNC: 263 MG/DL — HIGH (ref 70–99)
HCT VFR BLD CALC: 28.4 % — LOW (ref 39–50)
HGB BLD-MCNC: 9 G/DL — LOW (ref 13–17)
MCHC RBC-ENTMCNC: 28 PG — SIGNIFICANT CHANGE UP (ref 27–34)
MCHC RBC-ENTMCNC: 31.6 GM/DL — LOW (ref 32–36)
MCV RBC AUTO: 88.7 FL — SIGNIFICANT CHANGE UP (ref 80–100)
OB PNL STL: NEGATIVE — SIGNIFICANT CHANGE UP
PLATELET # BLD AUTO: 336 K/UL — SIGNIFICANT CHANGE UP (ref 150–400)
RBC # BLD: 3.2 M/UL — LOW (ref 4.2–5.8)
RBC # FLD: 12.7 % — SIGNIFICANT CHANGE UP (ref 10.3–14.5)
WBC # BLD: 7.4 K/UL — SIGNIFICANT CHANGE UP (ref 3.8–10.5)
WBC # FLD AUTO: 7.4 K/UL — SIGNIFICANT CHANGE UP (ref 3.8–10.5)

## 2019-08-08 PROCEDURE — 99232 SBSQ HOSP IP/OBS MODERATE 35: CPT

## 2019-08-08 PROCEDURE — 99233 SBSQ HOSP IP/OBS HIGH 50: CPT

## 2019-08-08 RX ORDER — LACTULOSE 10 G/15ML
10 SOLUTION ORAL ONCE
Refills: 0 | Status: COMPLETED | OUTPATIENT
Start: 2019-08-08 | End: 2019-08-08

## 2019-08-08 RX ORDER — INSULIN LISPRO 100/ML
2 VIAL (ML) SUBCUTANEOUS
Refills: 0 | Status: DISCONTINUED | OUTPATIENT
Start: 2019-08-08 | End: 2019-08-09

## 2019-08-08 RX ADMIN — POLYETHYLENE GLYCOL 3350 17 GRAM(S): 17 POWDER, FOR SOLUTION ORAL at 17:07

## 2019-08-08 RX ADMIN — CLOPIDOGREL BISULFATE 75 MILLIGRAM(S): 75 TABLET, FILM COATED ORAL at 11:06

## 2019-08-08 RX ADMIN — Medication 2: at 18:03

## 2019-08-08 RX ADMIN — ATORVASTATIN CALCIUM 80 MILLIGRAM(S): 80 TABLET, FILM COATED ORAL at 21:12

## 2019-08-08 RX ADMIN — POLYETHYLENE GLYCOL 3350 17 GRAM(S): 17 POWDER, FOR SOLUTION ORAL at 06:12

## 2019-08-08 RX ADMIN — Medication 100 MILLIGRAM(S): at 13:22

## 2019-08-08 RX ADMIN — Medication 20 MILLIEQUIVALENT(S): at 11:06

## 2019-08-08 RX ADMIN — TRAMADOL HYDROCHLORIDE 25 MILLIGRAM(S): 50 TABLET ORAL at 21:30

## 2019-08-08 RX ADMIN — Medication 100 MILLIGRAM(S): at 21:12

## 2019-08-08 RX ADMIN — Medication 1 GRAM(S): at 23:15

## 2019-08-08 RX ADMIN — TRAMADOL HYDROCHLORIDE 25 MILLIGRAM(S): 50 TABLET ORAL at 21:13

## 2019-08-08 RX ADMIN — Medication 1: at 22:13

## 2019-08-08 RX ADMIN — SENNA PLUS 2 TABLET(S): 8.6 TABLET ORAL at 21:13

## 2019-08-08 RX ADMIN — PANTOPRAZOLE SODIUM 40 MILLIGRAM(S): 20 TABLET, DELAYED RELEASE ORAL at 06:12

## 2019-08-08 RX ADMIN — Medication 1 GRAM(S): at 17:07

## 2019-08-08 RX ADMIN — INSULIN GLARGINE 24 UNIT(S): 100 INJECTION, SOLUTION SUBCUTANEOUS at 22:14

## 2019-08-08 RX ADMIN — Medication 1 GRAM(S): at 06:12

## 2019-08-08 RX ADMIN — PANTOPRAZOLE SODIUM 40 MILLIGRAM(S): 20 TABLET, DELAYED RELEASE ORAL at 17:07

## 2019-08-08 RX ADMIN — APIXABAN 5 MILLIGRAM(S): 2.5 TABLET, FILM COATED ORAL at 09:44

## 2019-08-08 RX ADMIN — Medication 2: at 13:20

## 2019-08-08 RX ADMIN — Medication 40 MILLIGRAM(S): at 06:12

## 2019-08-08 RX ADMIN — Medication 100 MILLIGRAM(S): at 06:12

## 2019-08-08 RX ADMIN — LACTULOSE 10 GRAM(S): 10 SOLUTION ORAL at 09:42

## 2019-08-08 RX ADMIN — Medication 1 GRAM(S): at 11:06

## 2019-08-08 RX ADMIN — TAMSULOSIN HYDROCHLORIDE 0.4 MILLIGRAM(S): 0.4 CAPSULE ORAL at 21:12

## 2019-08-08 RX ADMIN — Medication 81 MILLIGRAM(S): at 11:06

## 2019-08-08 RX ADMIN — APIXABAN 5 MILLIGRAM(S): 2.5 TABLET, FILM COATED ORAL at 21:13

## 2019-08-08 RX ADMIN — AMIODARONE HYDROCHLORIDE 100 MILLIGRAM(S): 400 TABLET ORAL at 09:44

## 2019-08-08 NOTE — PROGRESS NOTE ADULT - ASSESSMENT
49 M with PAF on eliquis, RUL subsegmental PE, hx of recent NSTEMI, hx of perforated antral ulcer, osteomyelitis s/p debridement,  HTN and DM2 presenting to [place] on 7/31/19 complaining of chest pain.   He is now s/p cath with mild LAD disease, 90% RCA and 100% RPL. He is now s/p BMS to RCA    - He remains chest pain free this morning.   - He will be on triple therapy for 1 week with ASA, Plavix and Eliquis, until Saturday. We will then d/c asa and continue plavix with eliquis for the next 3 weeks.     - no abd pain  - Had a maroon BM recently but no bm since. fobt testing has not been performed as a consequence  - GI f/u noted with no procedure planned in the absence of overt gib  - Monitor closely for bleeding. trend H/H, which is downtrending as of this am    - sr on telemetry, w/no additional AF. Continue with Amiodarone with hold parameters  - BB has been held in the setting of bradycardia    - continue with statin  - Euvolemic on exam. Continue with Po lasix.  - GI follow up for constipation and abdominal pain.  - Watch creatinine and electrolytes. Keep K>4, Mg>2  - The importance of strict follow up has been stressed.  - Will follow with you.

## 2019-08-08 NOTE — PROGRESS NOTE ADULT - PROBLEM SELECTOR PLAN 10
Lalctulose today   if pt moves BM / please have FOBT and will discuss with GI   I have explained to patient that based on the rec from GI and the FOBT , he might be dc today and he agrees.

## 2019-08-08 NOTE — PROGRESS NOTE ADULT - SUBJECTIVE AND OBJECTIVE BOX
INTERVAL HPI/OVERNIGHT EVENTS:    pt seen and examined.   given lactulose for constipation waiting to have a bm  tolerating PO well, denies n/v.    MEDICATIONS  (STANDING):  amiodarone    Tablet 100 milliGRAM(s) Oral <User Schedule>  apixaban 5 milliGRAM(s) Oral two times a day  aspirin enteric coated 81 milliGRAM(s) Oral daily  atorvastatin 80 milliGRAM(s) Oral at bedtime  clopidogrel Tablet 75 milliGRAM(s) Oral daily  dextrose 5%. 1000 milliLiter(s) (50 mL/Hr) IV Continuous <Continuous>  dextrose 50% Injectable 12.5 Gram(s) IV Push once  dextrose 50% Injectable 25 Gram(s) IV Push once  dextrose 50% Injectable 25 Gram(s) IV Push once  docusate sodium 100 milliGRAM(s) Oral three times a day  furosemide    Tablet 40 milliGRAM(s) Oral daily  insulin glargine Injectable (LANTUS) 24 Unit(s) SubCutaneous at bedtime  insulin lispro (HumaLOG) corrective regimen sliding scale   SubCutaneous three times a day before meals  insulin lispro (HumaLOG) corrective regimen sliding scale   SubCutaneous at bedtime  pantoprazole    Tablet 40 milliGRAM(s) Oral two times a day  polyethylene glycol 3350 17 Gram(s) Oral two times a day  potassium chloride    Tablet ER 20 milliEquivalent(s) Oral daily  senna 2 Tablet(s) Oral at bedtime  sucralfate 1 Gram(s) Oral four times a day  tamsulosin 0.4 milliGRAM(s) Oral at bedtime    MEDICATIONS  (PRN):  bisacodyl Suppository 10 milliGRAM(s) Rectal daily PRN Constipation  dextrose 40% Gel 15 Gram(s) Oral once PRN Blood Glucose LESS THAN 70 milliGRAM(s)/deciliter  glucagon  Injectable 1 milliGRAM(s) IntraMuscular once PRN Glucose LESS THAN 70 milligrams/deciliter  simethicone 80 milliGRAM(s) Chew four times a day PRN Upset Stomach  traMADol 25 milliGRAM(s) Oral every 12 hours PRN Moderate Pain (4 - 6)      Allergies    fish (Hives)  No Known Drug Allergies    Intolerances        Review of Systems:    General:  No wt loss, fevers, chills, night sweats,fatigue,   Eyes:  Good vision, no reported pain  ENT:  No sore throat, pain, runny nose, dysphagia  CV:  No pain, palpitations, hypo/hypertension  Resp:  No dyspnea, cough, tachypnea, wheezing  GI:  No pain, No nausea, No vomiting, No diarrhea, No constipation, No weight loss, No fever, No pruritis, No rectal bleeding, No melena, No dysphagia  :  No pain, bleeding, incontinence, nocturia  Muscle:  No pain, weakness  Neuro:  No weakness, tingling, memory problems  Psych:  No fatigue, insomnia, mood problems, depression  Endocrine:  No polyuria, polydypsia, cold/heat intolerance  Heme:  No petechiae, ecchymosis, easy bruisability  Skin:  No rash, tattoos, scars, edema      Vital Signs Last 24 Hrs  T(C): 36.7 (08 Aug 2019 04:00), Max: 37.2 (08 Aug 2019 00:04)  T(F): 98.1 (08 Aug 2019 04:00), Max: 99 (08 Aug 2019 00:04)  HR: 70 (08 Aug 2019 09:35) (59 - 70)  BP: 124/67 (08 Aug 2019 09:35) (103/60 - 130/77)  BP(mean): --  RR: 18 (08 Aug 2019 04:00) (18 - 18)  SpO2: 96% (08 Aug 2019 04:00) (96% - 97%)    PHYSICAL EXAM:    Constitutional: NAD, well-developed  HEENT: EOMI, throat clear  Neck: No LAD, supple  Respiratory: CTA and P  Cardiovascular: S1 and S2, RRR, no M  Gastrointestinal: BS+, soft, NT/ND, neg HSM,  Extremities: No peripheral edema, neg clubing, cyanosis  Vascular: 2+ peripheral pulses  Neurological: A/O x 3, no focal deficits  Psychiatric: Normal mood, normal affect  Skin: No rashes      LABS:                        9.0    7.4   )-----------( 336      ( 08 Aug 2019 06:00 )             28.4                 RADIOLOGY & ADDITIONAL TESTS:

## 2019-08-08 NOTE — PROGRESS NOTE ADULT - PROBLEM SELECTOR PLAN 1
- hgb remains stable  - continue ppi/carafate  - monitor cbc daily transfuse prbcs as needed  - no gi objection to triple therapy   - monitor stool color closely  - given recent PCI not candidate for endoscopic eval unless emergent  if hgb cont to decline or witnessed overt gi bleed would hold eliquis

## 2019-08-08 NOTE — PROGRESS NOTE ADULT - SUBJECTIVE AND OBJECTIVE BOX
Patient is a 49y old  Male who presents with a chief complaint of Chest pain (06 Aug 2019 11:11)      INTERVAL HPI/OVERNIGHT EVENTS:    Patient is seen and states that he is the same and did not move his BM yesterday.  One BM was recorded but as per nurse he smeared his bed sheat.  NO gross bleeding reported.        Medications:MEDICATIONS  (STANDING):  amiodarone    Tablet 100 milliGRAM(s) Oral <User Schedule>  apixaban 5 milliGRAM(s) Oral two times a day  aspirin enteric coated 81 milliGRAM(s) Oral daily  atorvastatin 80 milliGRAM(s) Oral at bedtime  clopidogrel Tablet 75 milliGRAM(s) Oral daily  dextrose 5%. 1000 milliLiter(s) (50 mL/Hr) IV Continuous <Continuous>  dextrose 50% Injectable 12.5 Gram(s) IV Push once  dextrose 50% Injectable 25 Gram(s) IV Push once  dextrose 50% Injectable 25 Gram(s) IV Push once  docusate sodium 100 milliGRAM(s) Oral three times a day  furosemide    Tablet 40 milliGRAM(s) Oral daily  insulin glargine Injectable (LANTUS) 24 Unit(s) SubCutaneous at bedtime  insulin lispro (HumaLOG) corrective regimen sliding scale   SubCutaneous three times a day before meals  insulin lispro (HumaLOG) corrective regimen sliding scale   SubCutaneous at bedtime  pantoprazole    Tablet 40 milliGRAM(s) Oral two times a day  polyethylene glycol 3350 17 Gram(s) Oral two times a day  potassium chloride    Tablet ER 20 milliEquivalent(s) Oral daily  senna 2 Tablet(s) Oral at bedtime  sucralfate 1 Gram(s) Oral four times a day  tamsulosin 0.4 milliGRAM(s) Oral at bedtime    MEDICATIONS  (PRN):  bisacodyl Suppository 10 milliGRAM(s) Rectal daily PRN Constipation  dextrose 40% Gel 15 Gram(s) Oral once PRN Blood Glucose LESS THAN 70 milliGRAM(s)/deciliter  glucagon  Injectable 1 milliGRAM(s) IntraMuscular once PRN Glucose LESS THAN 70 milligrams/deciliter  simethicone 80 milliGRAM(s) Chew four times a day PRN Upset Stomach  traMADol 25 milliGRAM(s) Oral every 12 hours PRN Moderate Pain (4 - 6)      Allergies: Allergies    fish (Hives)  No Known Drug Allergies    Intolerances        REVIEW OF SYSTEMS:  CONSTITUTIONAL: No fever  ENMT:   No sinus or throat pain  NECK: No pain or stiffness  RESPIRATORY: No cough, wheezing, chills or hemoptysis; No shortness of breath  CARDIOVASCULAR: No chest pain, palpitations, dizziness, or leg swelling  GASTROINTESTINAL: Unchanged abdominal or epigastric pain. No nausea, vomiting, or hematemesis; pos constipation   GENITOURINARY: pos jacques for urinary retention  NEUROLOGICAL: No headaches      T(C): 36.7 (08-08-19 @ 04:00), Max: 37.2 (08-08-19 @ 00:04)  HR: 70 (08-08-19 @ 09:35) (59 - 78)  BP: 124/67 (08-08-19 @ 09:35) (103/60 - 130/77)  RR: 18 (08-08-19 @ 04:00) (18 - 18)  SpO2: 96% (08-08-19 @ 04:00) (96% - 97%)  Wt(kg): --Vital Signs Last 24 Hrs  T(C): 36.7 (08 Aug 2019 04:00), Max: 37.2 (08 Aug 2019 00:04)  T(F): 98.1 (08 Aug 2019 04:00), Max: 99 (08 Aug 2019 00:04)  HR: 70 (08 Aug 2019 09:35) (59 - 78)  BP: 124/67 (08 Aug 2019 09:35) (103/60 - 130/77)  BP(mean): --  RR: 18 (08 Aug 2019 04:00) (18 - 18)  SpO2: 96% (08 Aug 2019 04:00) (96% - 97%)  I&O's Summary    07 Aug 2019 07:01  -  08 Aug 2019 07:00  --------------------------------------------------------  IN: 0 mL / OUT: 1100 mL / NET: -1100 mL      PHYSICAL EXAM:  GENERAL: NAD, well-groomed, well-developed  HEAD:  Atraumatic, Normocephalic  NECK: Supple, No JVD, Normal thyroid  NERVOUS SYSTEM:  Alert & Oriented X3, Good concentration  CHEST/LUNG: Clear to percussion bilaterally; No rales, rhonchi, wheezing, or rubs  HEART: Regular rate and rhythm; No murmurs, rubs, or gallops  ABDOMEN: Soft, pos mild discomfort on Deep palpation, Nondistended; Bowel sounds present      Consultant(s) Notes Reviewed:  [x ] YES  [ ] NO  Care Discussed with Consultants/Other Providers [ x] YES  [ ] NO  Name of Consultant  LABS:                        9.0    7.4   )-----------( 336      ( 08 Aug 2019 06:00 )             28.4               CAPILLARY BLOOD GLUCOSE      POCT Blood Glucose.: 111 mg/dL (08 Aug 2019 09:20)  POCT Blood Glucose.: 191 mg/dL (07 Aug 2019 21:25)  POCT Blood Glucose.: 221 mg/dL (07 Aug 2019 19:06)  POCT Blood Glucose.: 251 mg/dL (07 Aug 2019 17:55)  POCT Blood Glucose.: 203 mg/dL (07 Aug 2019 14:46)            RADIOLOGY & ADDITIONAL TESTS:  EKG :     Imaging Personally Reviewed:  [ ] YES  [ ] NO  HEALTH ISSUES - PROBLEM Dx:  Anemia, unspecified type: Anemia, unspecified type  PAF (paroxysmal atrial fibrillation): PAF (paroxysmal atrial fibrillation)  Urinary retention: Urinary retention  Chronic gastric ulcer with perforation: Chronic gastric ulcer with perforation  Essential hypertension: Essential hypertension  Lower abdominal pain: Lower abdominal pain  Chronic pulmonary embolism without acute cor pulmonale, unspecified pulmonary embolism type: Chronic pulmonary embolism without acute cor pulmonale, unspecified pulmonary embolism type  DM type 2, goal HbA1c < 7%: DM type 2, goal HbA1c &lt; 7%  Coronary artery disease involving native coronary artery of native heart, angina presence unspecified: Coronary artery disease involving native coronary artery of native heart, angina presence unspecified  ACP (advance care planning): ACP (advance care planning)  CAD (coronary artery disease): CAD (coronary artery disease)  Constipation: Constipation  Anemia: Anemia

## 2019-08-08 NOTE — PROGRESS NOTE ADULT - PROBLEM SELECTOR PLAN 1
- continue ppi/carafate.   - Case was discussed with Dr Lopez on 8/7 , no plan for EGD or Colonoscopy since pt recently had cardiac stent , and patient is to f/up as outpatient with GI post dc. Since pt states to have black stool ,will get Occult testing and case was discussed with the GI NP today who states that a decision will be made based on the occult results   - will get Orthostatic BP/HR   - monitor cbc daily transfuse prbcs as needed  - his baseline hemo has been 8-12 with some record of 7

## 2019-08-08 NOTE — PROGRESS NOTE ADULT - SUBJECTIVE AND OBJECTIVE BOX
[facility]    Bolivar Carbajal, El, Brittany, Zahra, Reynaldo, Román      [phone]    CHIEF COMPLAINT: Patient is a 49y old  Male who presents with a chief complaint of Chest pain (06 Aug 2019 11:11)      Follow Up: pci, anemia    Interim history: The patient reports no new symptoms.  Denies chest discomfort and shortness of breath.  No abdominal pain.  No new neurologic symptoms. constipated      MEDICATIONS  (STANDING):  amiodarone    Tablet 100 milliGRAM(s) Oral <User Schedule>  apixaban 5 milliGRAM(s) Oral two times a day  aspirin enteric coated 81 milliGRAM(s) Oral daily  atorvastatin 80 milliGRAM(s) Oral at bedtime  clopidogrel Tablet 75 milliGRAM(s) Oral daily  dextrose 5%. 1000 milliLiter(s) (50 mL/Hr) IV Continuous <Continuous>  dextrose 50% Injectable 12.5 Gram(s) IV Push once  dextrose 50% Injectable 25 Gram(s) IV Push once  dextrose 50% Injectable 25 Gram(s) IV Push once  docusate sodium 100 milliGRAM(s) Oral three times a day  furosemide    Tablet 40 milliGRAM(s) Oral daily  insulin glargine Injectable (LANTUS) 24 Unit(s) SubCutaneous at bedtime  insulin lispro (HumaLOG) corrective regimen sliding scale   SubCutaneous three times a day before meals  insulin lispro (HumaLOG) corrective regimen sliding scale   SubCutaneous at bedtime  pantoprazole    Tablet 40 milliGRAM(s) Oral two times a day  polyethylene glycol 3350 17 Gram(s) Oral two times a day  potassium chloride    Tablet ER 20 milliEquivalent(s) Oral daily  senna 2 Tablet(s) Oral at bedtime  sucralfate 1 Gram(s) Oral four times a day  tamsulosin 0.4 milliGRAM(s) Oral at bedtime    MEDICATIONS  (PRN):  bisacodyl Suppository 10 milliGRAM(s) Rectal daily PRN Constipation  dextrose 40% Gel 15 Gram(s) Oral once PRN Blood Glucose LESS THAN 70 milliGRAM(s)/deciliter  glucagon  Injectable 1 milliGRAM(s) IntraMuscular once PRN Glucose LESS THAN 70 milligrams/deciliter  simethicone 80 milliGRAM(s) Chew four times a day PRN Upset Stomach  traMADol 25 milliGRAM(s) Oral every 12 hours PRN Moderate Pain (4 - 6)      REVIEW OF SYSTEMS:  eye, ent, GI, , allergic, dermatologic, musculoskeletal and neurologic are negative except as described above    Vital Signs Last 24 Hrs  T(C): 36.7 (08 Aug 2019 04:00), Max: 37.2 (08 Aug 2019 00:04)  T(F): 98.1 (08 Aug 2019 04:00), Max: 99 (08 Aug 2019 00:04)  HR: 70 (08 Aug 2019 09:35) (59 - 70)  BP: 124/67 (08 Aug 2019 09:35) (103/60 - 130/77)  BP(mean): --  RR: 18 (08 Aug 2019 04:00) (18 - 18)  SpO2: 96% (08 Aug 2019 04:00) (96% - 97%)    I&O's Summary    07 Aug 2019 07:01  -  08 Aug 2019 07:00  --------------------------------------------------------  IN: 0 mL / OUT: 1100 mL / NET: -1100 mL        Telemetry past 24h: sr    PHYSICAL EXAM:    Constitutional: well-nourished, well-developed, NAD   HEENT:  MMM, sclerae anicteric, conjunctivae clear, no oral cyanosis.  Pulmonary: Non-labored, breath sounds are clear bilaterally, No wheezing, rales or rhonchi  Cardiovascular: Regular, S1 and S2.  No murmur.  No rubs, gallops or clicks  Gastrointestinal: Bowel Sounds present, soft, nontender.   Lymph: No peripheral edema.   Neurological: Alert, no focal deficits  Skin: No rashes.  Psych:  Mood & affect appropriate    LABS: All Labs Reviewed:                        9.0    7.4   )-----------( 336      ( 08 Aug 2019 06:00 )             28.4                         9.3    9.6   )-----------( 320      ( 07 Aug 2019 06:03 )             28.7                         10.5   9.1   )-----------( 360      ( 05 Aug 2019 15:31 )             32.0     05 Aug 2019 15:31    137    |  97     |  20     ----------------------------<  98     4.1     |  25     |  1.06     Ca    9.8        05 Aug 2019 15:31  Phos  3.8       05 Aug 2019 15:31  Mg     2.1       05 Aug 2019 15:31            Blood Culture:         RADIOLOGY:    EKG:    Echo:

## 2019-08-08 NOTE — PROGRESS NOTE ADULT - ASSESSMENT
49M with PMH of PAF on eliquis, RUL subsegmental PE, hx of recent NSTEMI, hx of perforated antral ulcer (S/P  emergent ex-lap omentopexy and plication in 5/2019) , osteomyelitis s/p debridement,  HTN and DM2 presented to the ED at [place] on 7/31/19 complaining of chest pain.  On 8/1/19, patient had diagnostic  LHC revealing, RCA 90%; %.  He is now s/p BMS to RCA.   Patient was seen by GI and was cleared for Eliquis/Plavix and ASA.  NOw with lower abdominal pain for many weeks of unclear etiology.

## 2019-08-09 VITALS
OXYGEN SATURATION: 98 % | SYSTOLIC BLOOD PRESSURE: 130 MMHG | DIASTOLIC BLOOD PRESSURE: 86 MMHG | HEART RATE: 78 BPM | TEMPERATURE: 99 F | RESPIRATION RATE: 18 BRPM

## 2019-08-09 LAB
ANION GAP SERPL CALC-SCNC: 13 MMOL/L — SIGNIFICANT CHANGE UP (ref 5–17)
BUN SERPL-MCNC: 20 MG/DL — SIGNIFICANT CHANGE UP (ref 7–23)
CALCIUM SERPL-MCNC: 9.5 MG/DL — SIGNIFICANT CHANGE UP (ref 8.4–10.5)
CHLORIDE SERPL-SCNC: 101 MMOL/L — SIGNIFICANT CHANGE UP (ref 96–108)
CK MB CFR SERPL CALC: 3.6 NG/ML — SIGNIFICANT CHANGE UP (ref 0–6.7)
CK SERPL-CCNC: 55 U/L — SIGNIFICANT CHANGE UP (ref 30–200)
CO2 SERPL-SCNC: 26 MMOL/L — SIGNIFICANT CHANGE UP (ref 22–31)
CREAT SERPL-MCNC: 1.01 MG/DL — SIGNIFICANT CHANGE UP (ref 0.5–1.3)
GLUCOSE BLDC GLUCOMTR-MCNC: 172 MG/DL — HIGH (ref 70–99)
GLUCOSE BLDC GLUCOMTR-MCNC: 220 MG/DL — HIGH (ref 70–99)
GLUCOSE SERPL-MCNC: 171 MG/DL — HIGH (ref 70–99)
HCT VFR BLD CALC: 29.2 % — LOW (ref 39–50)
HGB BLD-MCNC: 9.2 G/DL — LOW (ref 13–17)
MCHC RBC-ENTMCNC: 28 PG — SIGNIFICANT CHANGE UP (ref 27–34)
MCHC RBC-ENTMCNC: 31.7 GM/DL — LOW (ref 32–36)
MCV RBC AUTO: 88.6 FL — SIGNIFICANT CHANGE UP (ref 80–100)
PLATELET # BLD AUTO: 362 K/UL — SIGNIFICANT CHANGE UP (ref 150–400)
POTASSIUM SERPL-MCNC: 3.7 MMOL/L — SIGNIFICANT CHANGE UP (ref 3.5–5.3)
POTASSIUM SERPL-SCNC: 3.7 MMOL/L — SIGNIFICANT CHANGE UP (ref 3.5–5.3)
RBC # BLD: 3.3 M/UL — LOW (ref 4.2–5.8)
RBC # FLD: 12.8 % — SIGNIFICANT CHANGE UP (ref 10.3–14.5)
SODIUM SERPL-SCNC: 140 MMOL/L — SIGNIFICANT CHANGE UP (ref 135–145)
TROPONIN T, HIGH SENSITIVITY RESULT: 49 NG/L — SIGNIFICANT CHANGE UP (ref 0–51)
TROPONIN T, HIGH SENSITIVITY RESULT: 54 NG/L — HIGH (ref 0–51)
WBC # BLD: 8.3 K/UL — SIGNIFICANT CHANGE UP (ref 3.8–10.5)
WBC # FLD AUTO: 8.3 K/UL — SIGNIFICANT CHANGE UP (ref 3.8–10.5)

## 2019-08-09 PROCEDURE — 86901 BLOOD TYPING SEROLOGIC RH(D): CPT

## 2019-08-09 PROCEDURE — 99232 SBSQ HOSP IP/OBS MODERATE 35: CPT

## 2019-08-09 PROCEDURE — 82553 CREATINE MB FRACTION: CPT

## 2019-08-09 PROCEDURE — 82550 ASSAY OF CK (CPK): CPT

## 2019-08-09 PROCEDURE — 80048 BASIC METABOLIC PNL TOTAL CA: CPT

## 2019-08-09 PROCEDURE — 93005 ELECTROCARDIOGRAM TRACING: CPT

## 2019-08-09 PROCEDURE — 83735 ASSAY OF MAGNESIUM: CPT

## 2019-08-09 PROCEDURE — C1769: CPT

## 2019-08-09 PROCEDURE — C1725: CPT

## 2019-08-09 PROCEDURE — 84484 ASSAY OF TROPONIN QUANT: CPT

## 2019-08-09 PROCEDURE — 86850 RBC ANTIBODY SCREEN: CPT

## 2019-08-09 PROCEDURE — C1887: CPT

## 2019-08-09 PROCEDURE — 85027 COMPLETE CBC AUTOMATED: CPT

## 2019-08-09 PROCEDURE — 97162 PT EVAL MOD COMPLEX 30 MIN: CPT

## 2019-08-09 PROCEDURE — 93010 ELECTROCARDIOGRAM REPORT: CPT

## 2019-08-09 PROCEDURE — 97116 GAIT TRAINING THERAPY: CPT

## 2019-08-09 PROCEDURE — 99239 HOSP IP/OBS DSCHRG MGMT >30: CPT

## 2019-08-09 PROCEDURE — 97530 THERAPEUTIC ACTIVITIES: CPT

## 2019-08-09 PROCEDURE — 93458 L HRT ARTERY/VENTRICLE ANGIO: CPT

## 2019-08-09 PROCEDURE — 84100 ASSAY OF PHOSPHORUS: CPT

## 2019-08-09 PROCEDURE — 92941 PRQ TRLML REVSC TOT OCCL AMI: CPT | Mod: RC

## 2019-08-09 PROCEDURE — 80053 COMPREHEN METABOLIC PANEL: CPT

## 2019-08-09 PROCEDURE — C1894: CPT

## 2019-08-09 PROCEDURE — 86900 BLOOD TYPING SEROLOGIC ABO: CPT

## 2019-08-09 PROCEDURE — 99152 MOD SED SAME PHYS/QHP 5/>YRS: CPT

## 2019-08-09 PROCEDURE — 82962 GLUCOSE BLOOD TEST: CPT

## 2019-08-09 PROCEDURE — C1874: CPT

## 2019-08-09 PROCEDURE — 76700 US EXAM ABDOM COMPLETE: CPT

## 2019-08-09 PROCEDURE — 82272 OCCULT BLD FECES 1-3 TESTS: CPT

## 2019-08-09 RX ORDER — APIXABAN 2.5 MG/1
1 TABLET, FILM COATED ORAL
Qty: 60 | Refills: 0
Start: 2019-08-09 | End: 2019-09-07

## 2019-08-09 RX ORDER — INSULIN LISPRO 100/ML
3 VIAL (ML) SUBCUTANEOUS
Qty: 1 | Refills: 0
Start: 2019-08-09 | End: 2019-09-07

## 2019-08-09 RX ORDER — INSULIN GLARGINE 100 [IU]/ML
30 INJECTION, SOLUTION SUBCUTANEOUS
Qty: 0 | Refills: 0 | DISCHARGE

## 2019-08-09 RX ORDER — FUROSEMIDE 40 MG
1 TABLET ORAL
Qty: 30 | Refills: 0
Start: 2019-08-09 | End: 2019-09-07

## 2019-08-09 RX ORDER — INSULIN GLARGINE 100 [IU]/ML
24 INJECTION, SOLUTION SUBCUTANEOUS
Qty: 1 | Refills: 0
Start: 2019-08-09 | End: 2019-09-07

## 2019-08-09 RX ORDER — POTASSIUM CHLORIDE 20 MEQ
1 PACKET (EA) ORAL
Qty: 30 | Refills: 0
Start: 2019-08-09 | End: 2019-09-07

## 2019-08-09 RX ORDER — POLYETHYLENE GLYCOL 3350 17 G/17G
17 POWDER, FOR SOLUTION ORAL
Qty: 17 | Refills: 0
Start: 2019-08-09 | End: 2019-09-07

## 2019-08-09 RX ORDER — PANTOPRAZOLE SODIUM 20 MG/1
1 TABLET, DELAYED RELEASE ORAL
Qty: 60 | Refills: 0
Start: 2019-08-09 | End: 2019-09-07

## 2019-08-09 RX ORDER — SUCRALFATE 1 G
1 TABLET ORAL
Qty: 120 | Refills: 0
Start: 2019-08-09 | End: 2019-09-07

## 2019-08-09 RX ORDER — DOCUSATE SODIUM 100 MG
1 CAPSULE ORAL
Qty: 90 | Refills: 0
Start: 2019-08-09 | End: 2019-09-07

## 2019-08-09 RX ORDER — METFORMIN HYDROCHLORIDE 850 MG/1
0 TABLET ORAL
Qty: 0 | Refills: 0 | DISCHARGE

## 2019-08-09 RX ORDER — MORPHINE SULFATE 50 MG/1
1 CAPSULE, EXTENDED RELEASE ORAL ONCE
Refills: 0 | Status: DISCONTINUED | OUTPATIENT
Start: 2019-08-09 | End: 2019-08-09

## 2019-08-09 RX ORDER — TRAMADOL HYDROCHLORIDE 50 MG/1
0.5 TABLET ORAL
Qty: 5 | Refills: 0
Start: 2019-08-09 | End: 2019-08-13

## 2019-08-09 RX ORDER — INSULIN LISPRO 100/ML
3 VIAL (ML) SUBCUTANEOUS
Qty: 0 | Refills: 0 | DISCHARGE

## 2019-08-09 RX ORDER — METFORMIN HYDROCHLORIDE 850 MG/1
1 TABLET ORAL
Qty: 60 | Refills: 0
Start: 2019-08-09 | End: 2019-09-07

## 2019-08-09 RX ORDER — AMIODARONE HYDROCHLORIDE 400 MG/1
1 TABLET ORAL
Qty: 30 | Refills: 0
Start: 2019-08-09 | End: 2019-09-07

## 2019-08-09 RX ORDER — ZOLPIDEM TARTRATE 10 MG/1
1 TABLET ORAL
Qty: 0 | Refills: 0 | DISCHARGE

## 2019-08-09 RX ORDER — SENNA PLUS 8.6 MG/1
2 TABLET ORAL
Qty: 60 | Refills: 0
Start: 2019-08-09 | End: 2019-09-07

## 2019-08-09 RX ORDER — ISOSORBIDE MONONITRATE 60 MG/1
30 TABLET, EXTENDED RELEASE ORAL DAILY
Refills: 0 | Status: DISCONTINUED | OUTPATIENT
Start: 2019-08-09 | End: 2019-08-09

## 2019-08-09 RX ORDER — ASPIRIN/CALCIUM CARB/MAGNESIUM 324 MG
1 TABLET ORAL
Qty: 0 | Refills: 0 | DISCHARGE

## 2019-08-09 RX ORDER — APIXABAN 2.5 MG/1
1 TABLET, FILM COATED ORAL
Qty: 0 | Refills: 0 | DISCHARGE

## 2019-08-09 RX ORDER — ISOSORBIDE MONONITRATE 60 MG/1
1 TABLET, EXTENDED RELEASE ORAL
Qty: 30 | Refills: 0
Start: 2019-08-09 | End: 2019-09-07

## 2019-08-09 RX ORDER — TAMSULOSIN HYDROCHLORIDE 0.4 MG/1
1 CAPSULE ORAL
Qty: 30 | Refills: 0
Start: 2019-08-09 | End: 2019-09-07

## 2019-08-09 RX ORDER — ATORVASTATIN CALCIUM 80 MG/1
1 TABLET, FILM COATED ORAL
Qty: 30 | Refills: 0
Start: 2019-08-09 | End: 2019-09-07

## 2019-08-09 RX ORDER — CLOPIDOGREL BISULFATE 75 MG/1
1 TABLET, FILM COATED ORAL
Qty: 30 | Refills: 0
Start: 2019-08-09 | End: 2019-09-07

## 2019-08-09 RX ORDER — SUCRALFATE 1 G
1 TABLET ORAL
Qty: 0 | Refills: 0 | DISCHARGE

## 2019-08-09 RX ADMIN — Medication 1 GRAM(S): at 12:11

## 2019-08-09 RX ADMIN — PANTOPRAZOLE SODIUM 40 MILLIGRAM(S): 20 TABLET, DELAYED RELEASE ORAL at 05:16

## 2019-08-09 RX ADMIN — Medication 20 MILLIEQUIVALENT(S): at 12:11

## 2019-08-09 RX ADMIN — MORPHINE SULFATE 1 MILLIGRAM(S): 50 CAPSULE, EXTENDED RELEASE ORAL at 07:27

## 2019-08-09 RX ADMIN — ISOSORBIDE MONONITRATE 30 MILLIGRAM(S): 60 TABLET, EXTENDED RELEASE ORAL at 13:56

## 2019-08-09 RX ADMIN — CLOPIDOGREL BISULFATE 75 MILLIGRAM(S): 75 TABLET, FILM COATED ORAL at 12:11

## 2019-08-09 RX ADMIN — Medication 81 MILLIGRAM(S): at 12:11

## 2019-08-09 RX ADMIN — Medication 100 MILLIGRAM(S): at 12:11

## 2019-08-09 RX ADMIN — Medication 1: at 10:42

## 2019-08-09 RX ADMIN — Medication 2: at 14:08

## 2019-08-09 RX ADMIN — Medication 40 MILLIGRAM(S): at 05:16

## 2019-08-09 RX ADMIN — Medication 2 UNIT(S): at 10:43

## 2019-08-09 RX ADMIN — Medication 1 GRAM(S): at 05:16

## 2019-08-09 RX ADMIN — APIXABAN 5 MILLIGRAM(S): 2.5 TABLET, FILM COATED ORAL at 09:11

## 2019-08-09 RX ADMIN — Medication 2 UNIT(S): at 14:09

## 2019-08-09 RX ADMIN — AMIODARONE HYDROCHLORIDE 100 MILLIGRAM(S): 400 TABLET ORAL at 09:11

## 2019-08-09 RX ADMIN — Medication 100 MILLIGRAM(S): at 05:16

## 2019-08-09 NOTE — CHART NOTE - NSCHARTNOTEFT_GEN_A_CORE
Called by RN to evaluate pt complaining of chest pain. Pt seen and evaluated. Pt reports of sharp substernal chest pain radiating to left side of chest rating 9/10. Pt states that the pain is the similar pain he came to hospital with. Denies any lightheadedness, dizziness, shortness of breath, palpitations, abdominal pain, nausea, or vomiting.     PAST MEDICAL & SURGICAL HISTORY:  Osteomyelitis: s/p debridement  History of non-ST elevation myocardial infarction (NSTEMI)  Pulmonary embolism  Perforated gastric ulcer: s/p emergent ex-lap omentopexy and plication 6/2019  BPH (benign prostatic hyperplasia)  Hypertension  Afib  Diabetes mellitus with no complication  Diabetes  Traumatic amputation of left foot, initial encounter  Perforated gastric ulcer  H/O abdominal surgery    Vital Signs Last 24 Hrs  T(C): 37 (09 Aug 2019 06:45), Max: 37.3 (08 Aug 2019 13:26)  T(F): 98.6 (09 Aug 2019 06:45), Max: 99.1 (08 Aug 2019 13:26)  HR: 78 (09 Aug 2019 06:45) (59 - 78)  BP: 125/70 (09 Aug 2019 06:45) (119/70 - 149/79)  BP(mean): --  RR: 18 (09 Aug 2019 06:45) (18 - 18)  SpO2: 99% (09 Aug 2019 06:45) (96% - 100%)      PE:   General: A & O X 3, NAD  CV: S1,S2 RRR  Pulm: CTA b/l  Abd: soft, NT, ND, + BS X 4  Ext: no edema, + pedal pulses     Event Summary:      49M with PMH of PAF on eliquis, RUL subsegmental PE, hx of recent NSTEMI, hx of perforated antral ulcer (S/P  emergent ex-lap omentopexy and plication in 5/2019) , osteomyelitis s/p debridement,  HTN and DM2 presented to the ED at [place] on 7/31/19 complaining of chest pain.  On 8/1/19, patient had diagnostic  LHC revealing, RCA 90%; %.  He is now s/p BMS to RCA.   Patient was seen by GI and was cleared for Eliquis/Plavix and ASA. now being evaluating as pt complaining of chest pain    Chest pain  Pt with recent stent placement   EKG: Normal sinus rhythm, no ST/T wave changes  Will send troponin X 1  Morphine 1 mg IV X 1 for pain  Continue current treatment with ASA, plavix and Eliquis   Will endorse to day team to follow troponin  F/U with primary team in AM    Anatoly Choi NP  [id number]  .

## 2019-08-09 NOTE — PROGRESS NOTE ADULT - PROBLEM SELECTOR PLAN 5
Unsure of Etiology   Tramadol for pain   ABd US done with acute pathology   I have explained that he needs  to request for the Tramadol when he experiences abd pain  NO plan so far for further diagnostic testing as per GI
Unsure of Etiology   Tramadol for pain   ABd US is ordered   F/up report   Monitor LFTs
Unsure of Etiology   Tramadol for pain   ABd US is ordered   F/up report   Monitor LFTs
Unsure of Etiology   Tramadol for pain   ABd US done with acute pathology   I have explained that he needs  to request for the Tramadol when he experiences abd pain
Unsure of Etiology   Tramadol for pain   ABd US done with acute pathology   I have explained that he needs  to request for the Tramadol when he experiences abd pain  NO plan so far for further diagnostic testing as per GI
Unsure of Etiology   Tramadol for pain   ABd US done with acute pathology   F/up report   Monitor LFTs
ON Furosemide

## 2019-08-09 NOTE — PROGRESS NOTE ADULT - PROBLEM SELECTOR PROBLEM 7
Chronic gastric ulcer with perforation
Urinary retention

## 2019-08-09 NOTE — PROGRESS NOTE ADULT - PROBLEM SELECTOR PLAN 3
Continue Lantus and pre meal   A1C 8.3.
- s/p diagnostic cath   - plan for intervention on occluded blood vessels  - now s/p cath with BMS to RCA on triple therapy
- s/p diagnostic cath   - plan for intervention on occluded blood vessels.  - now s/p cath with BMS to RCA on triple therapy
Continue Lantus and pre meal   A1C 8.3.
Continue Lantus and pre meal   A1C 8.3.
s/p diagnostic cath   - plan for intervention on occluded blood vessels.  -now s/p cath with BMS to RCA on triple therapy
s/p diagnostic cath   - plan for intervention on occluded blood vessels.  -now s/p cath with BMS to RCA on triple therapy
Continue Lantus and pre meal   A1C 8.3.
LELE Varner

## 2019-08-09 NOTE — PROGRESS NOTE ADULT - ASSESSMENT
49 M with PAF on eliquis, RUL subsegmental PE, hx of recent NSTEMI, hx of perforated antral ulcer, osteomyelitis s/p debridement,  HTN and DM2 presenting to [place] on 7/31/19 complaining of chest pain.   He is now s/p cath with mild LAD disease, 90% RCA and 100% RPL. He is now s/p BMS to RCA    - He had an episode of chest pain this morning. EKG is unremarkable. Please send another hs troponin in a few hours.  - He will be on triple therapy for 1 week with ASA, Plavix and Eliquis, until Saturday. We will then d/c asa and continue plavix with eliquis for the next 3 weeks.     - no additional abdominal pain  - Had a maroon BM a few days ago. he is fobt negative as of 8/8/2019.  - GI f/u noted with no procedure planned in the absence of overt gib  - Monitor closely for bleeding. trend H/H, which is downtrending as of this am    - sr on telemetry, w/no additional AF. Continue with Amiodarone with hold parameters  - BB has been held in the setting of bradycardia    - continue with statin  - Euvolemic on exam. Continue with Po lasix.  - GI follow up for constipation and abdominal pain.    - Watch creatinine and electrolytes. Keep K>4, Mg>2  - The importance of strict follow up has been stressed.  - Will follow with you.

## 2019-08-09 NOTE — PROGRESS NOTE ADULT - REASON FOR ADMISSION
Chest pain
CAD
chest pain
chest pain, cad
Chest pain

## 2019-08-09 NOTE — PROGRESS NOTE ADULT - PROBLEM SELECTOR PROBLEM 6
Essential hypertension
Essential hypertension
Chronic gastric ulcer with perforation
Essential hypertension

## 2019-08-09 NOTE — PROGRESS NOTE ADULT - SUBJECTIVE AND OBJECTIVE BOX
[facility] - Bolivar Carbajal, El, Brittany, Zahra, Román Jacobs  Office Number:  [phone]    Patient resting comfortably in bed in NAD.  Laying flat with no respiratory distress.  No complaints of dyspnea, palpitations, PND, or orthopnea.  He reports chest pain this morning.    ROS: negative unless otherwise mentioned.    Telemetry:  sr/sb    MEDICATIONS  (STANDING):  amiodarone    Tablet 100 milliGRAM(s) Oral <User Schedule>  apixaban 5 milliGRAM(s) Oral two times a day  aspirin enteric coated 81 milliGRAM(s) Oral daily  atorvastatin 80 milliGRAM(s) Oral at bedtime  clopidogrel Tablet 75 milliGRAM(s) Oral daily  dextrose 5%. 1000 milliLiter(s) (50 mL/Hr) IV Continuous <Continuous>  dextrose 50% Injectable 12.5 Gram(s) IV Push once  dextrose 50% Injectable 25 Gram(s) IV Push once  dextrose 50% Injectable 25 Gram(s) IV Push once  docusate sodium 100 milliGRAM(s) Oral three times a day  furosemide    Tablet 40 milliGRAM(s) Oral daily  insulin glargine Injectable (LANTUS) 24 Unit(s) SubCutaneous at bedtime  insulin lispro (HumaLOG) corrective regimen sliding scale   SubCutaneous three times a day before meals  insulin lispro (HumaLOG) corrective regimen sliding scale   SubCutaneous at bedtime  insulin lispro Injectable (HumaLOG) 2 Unit(s) SubCutaneous three times a day before meals  pantoprazole    Tablet 40 milliGRAM(s) Oral two times a day  polyethylene glycol 3350 17 Gram(s) Oral two times a day  potassium chloride    Tablet ER 20 milliEquivalent(s) Oral daily  senna 2 Tablet(s) Oral at bedtime  sucralfate 1 Gram(s) Oral four times a day  tamsulosin 0.4 milliGRAM(s) Oral at bedtime    MEDICATIONS  (PRN):  bisacodyl Suppository 10 milliGRAM(s) Rectal daily PRN Constipation  dextrose 40% Gel 15 Gram(s) Oral once PRN Blood Glucose LESS THAN 70 milliGRAM(s)/deciliter  glucagon  Injectable 1 milliGRAM(s) IntraMuscular once PRN Glucose LESS THAN 70 milligrams/deciliter  simethicone 80 milliGRAM(s) Chew four times a day PRN Upset Stomach  traMADol 25 milliGRAM(s) Oral every 12 hours PRN Moderate Pain (4 - 6)      Allergies    fish (Hives)  No Known Drug Allergies    Intolerances        Vital Signs Last 24 Hrs  T(C): 37 (09 Aug 2019 06:45), Max: 37.3 (08 Aug 2019 13:26)  T(F): 98.6 (09 Aug 2019 06:45), Max: 99.1 (08 Aug 2019 13:26)  HR: 60 (09 Aug 2019 09:05) (59 - 78)  BP: 133/75 (09 Aug 2019 09:05) (119/70 - 149/79)  BP(mean): --  RR: 18 (09 Aug 2019 06:45) (18 - 18)  SpO2: 99% (09 Aug 2019 06:45) (96% - 100%)    I&O's Summary    08 Aug 2019 07:01  -  09 Aug 2019 07:00  --------------------------------------------------------  IN: 0 mL / OUT: 3340 mL / NET: -3340 mL        ON EXAM:    Constitutional: well-nourished, well-developed, NAD   HEENT:  MMM, sclerae anicteric, conjunctivae clear, no oral cyanosis.  Pulmonary: Non-labored, breath sounds are clear bilaterally, No wheezing, rales or rhonchi  Cardiovascular: Regular, S1 and S2.  No murmur.  No rubs, gallops or clicks  Gastrointestinal: Bowel Sounds present, soft, nontender.   Lymph: No peripheral edema.   Neurological: Alert, no focal deficits  Skin: No rashes.  Psych:  Mood & affect appropriate    LABS: All Labs Reviewed:                        9.2    8.3   )-----------( 362      ( 09 Aug 2019 06:50 )             29.2                         9.0    7.4   )-----------( 336      ( 08 Aug 2019 06:00 )             28.4                         9.3    9.6   )-----------( 320      ( 07 Aug 2019 06:03 )             28.7     09 Aug 2019 07:19    140    |  101    |  20     ----------------------------<  171    3.7     |  26     |  1.01     Ca    9.5        09 Aug 2019 07:19            Blood Culture:

## 2019-08-09 NOTE — PROGRESS NOTE ADULT - PROBLEM SELECTOR PROBLEM 2
Coronary artery disease involving native coronary artery of native heart, angina presence unspecified
Coronary artery disease involving native coronary artery of native heart, angina presence unspecified
Constipation
Coronary artery disease involving native coronary artery of native heart, angina presence unspecified
DM type 2, goal HbA1c < 7%
Coronary artery disease involving native coronary artery of native heart, angina presence unspecified

## 2019-08-09 NOTE — PROGRESS NOTE ADULT - NSHPATTENDINGPLANDISCUSS_GEN_ALL_CORE
NP Pranav
KITTY Benson
KITTY Rock and  and HAKEEM Vega ( GI)
Cardiologist ( Dr France) and the KITTY Rock
KITTY Benson

## 2019-08-09 NOTE — PROGRESS NOTE ADULT - PROBLEM SELECTOR PLAN 9
COnt Amiodarone   rate is controlled   monitor LFT / TFT   on ELiquis

## 2019-08-09 NOTE — PROGRESS NOTE ADULT - ASSESSMENT
49M with PMH of PAF on eliquis, RUL subsegmental PE, hx of recent NSTEMI, hx of perforated antral ulcer (S/P  emergent ex-lap omentopexy and plication in 5/2019) , osteomyelitis s/p debridement,  HTN and DM2 presented to the ED at [place] on 7/31/19 complaining of chest pain.  On 8/1/19, patient had diagnostic  LHC revealing, RCA 90%; %.  He is now s/p BMS to RCA.   Patient was seen by GI and was cleared for Eliquis/Plavix and ASA.  NOw with lower abdominal pain for many weeks of unclear etiology with normal Abd US .

## 2019-08-09 NOTE — PROGRESS NOTE ADULT - PROBLEM SELECTOR PLAN 2
S/P cardiac cath with mild LAD disease, 90% RCA and 100% RPL, s/p BMS to RCA   :  As per Cardiology  : He will be on triple therapy for 1 week with ASA, Plavix and Eliquis, until Saturday. We will then d/c asa and continue plavix with eliquis for the next 3 weeks. Monitor closely for bleeding. hb is stable.  BB was on HOLD due to bradycardia / might restart at very low dose if WCTs recur and if ok with cardio   Pt will f/up with cardiologist within one week of hospital dc
S/P cardiac cath with mild LAD disease, 90% RCA and 100% RPL, s/p BMS to RCA  CW ASA, Plavix, Atorvastatin  BB was on HOLD due to bradycardia   Not on ACEI   Monitor HR
S/P cardiac cath with mild LAD disease, 90% RCA and 100% RPL, s/p BMS to RCA  CW ASA, Plavix, Atorvastatin  BB was on HOLD due to bradycardia / might restart at very low dose if WCTs recur and if ok with cardio   Not on ACEI   Monitor HR
- continue senna 2 tabs at bedtime, colace 100 mg TID  - continue Miralax 17 grams BID
S/P cardiac cath with mild LAD disease, 90% RCA and 100% RPL, s/p BMS to RCA   :  As per Cardiology  : He will be on triple therapy for 1 week with ASA, Plavix and Eliquis, until Saturday. We will then d/c asa and continue plavix with eliquis for the next 3 weeks. Monitor closely for bleeding. hb is stable.  BB was on HOLD due to bradycardia / might restart at very low dose if WCTs recur and if ok with cardio   Case was discussed with the Cardiologist who recommended Imdur / ok to dc home as per cardiologist and f/up as outpatient   Pt will f/up with cardiologist within one week of hospital dc
S/P cardiac cath with mild LAD disease, 90% RCA and 100% RPL, s/p BMS to RCA   :  As per Cardiology  : He will be on triple therapy for 1 week with ASA, Plavix and Eliquis, until Saturday. We will then d/c asa and continue plavix with eliquis for the next 3 weeks. Monitor closely for bleeding. hb is stable.  BB was on HOLD due to bradycardia / might restart at very low dose if WCTs recur and if ok with cardio   Pt will f/up with cardiologist within one week of hospital dc
continue senna 2 tabs at bedtime, colace 100 mg TID  - continue Miralax 17 grams BID
continue senna 2 tabs at bedtime, colace 100 mg TID  - continue Miralax 17 grams BID
S/P cardiac cath with mild LAD disease, 90% RCA and 100% RPL, s/p BMS to RCA   :  As per Cardiology  : He will be on triple therapy for 1 week with ASA, Plavix and Eliquis, until Saturday. We will then d/c asa and continue plavix with eliquis for the next 3 weeks. Monitor closely for bleeding. hb is stable.  BB was on HOLD due to bradycardia / might restart at very low dose if WCTs recur and if ok with cardio   Pt will f/up with cardiologist within one week of hospital dc
COnt Lantus   CW insulin scale   MIght start bolus insulin tomorrow based on post prandial glucose   A1C = 8.3

## 2019-08-09 NOTE — PROGRESS NOTE ADULT - PROBLEM SELECTOR PROBLEM 8
Urinary retention
PAF (paroxysmal atrial fibrillation)

## 2019-08-09 NOTE — PROVIDER CONTACT NOTE (OTHER) - ASSESSMENT
Pt AO x4, VSS. Pt asymptomatic.
Pt has subjective CP, but no s/s objectively. VSS
Patient is A&Ox4. VSS. Patient denies any SOB, chest pain or palpitation. Patient is comfortable in the bed.

## 2019-08-09 NOTE — PROGRESS NOTE ADULT - SUBJECTIVE AND OBJECTIVE BOX
Patient is a 49y old  Male who presents with a chief complaint of chest pain, cad (09 Aug 2019 10:19)      INTERVAL HPI/OVERNIGHT EVENTS:    Patient was seen in AM , pt was reported having chest pain for which Morphine was administered and Troponin was ordered and was found to be 49-->54 with CKMB of 3.6    NO bleed is reported.      Medications:MEDICATIONS  (STANDING):  amiodarone    Tablet 100 milliGRAM(s) Oral <User Schedule>  apixaban 5 milliGRAM(s) Oral two times a day  aspirin enteric coated 81 milliGRAM(s) Oral daily  atorvastatin 80 milliGRAM(s) Oral at bedtime  clopidogrel Tablet 75 milliGRAM(s) Oral daily  dextrose 5%. 1000 milliLiter(s) (50 mL/Hr) IV Continuous <Continuous>  dextrose 50% Injectable 12.5 Gram(s) IV Push once  dextrose 50% Injectable 25 Gram(s) IV Push once  dextrose 50% Injectable 25 Gram(s) IV Push once  docusate sodium 100 milliGRAM(s) Oral three times a day  furosemide    Tablet 40 milliGRAM(s) Oral daily  insulin glargine Injectable (LANTUS) 24 Unit(s) SubCutaneous at bedtime  insulin lispro (HumaLOG) corrective regimen sliding scale   SubCutaneous three times a day before meals  insulin lispro (HumaLOG) corrective regimen sliding scale   SubCutaneous at bedtime  insulin lispro Injectable (HumaLOG) 2 Unit(s) SubCutaneous three times a day before meals  isosorbide   mononitrate ER Tablet (IMDUR) 30 milliGRAM(s) Oral daily  pantoprazole    Tablet 40 milliGRAM(s) Oral two times a day  polyethylene glycol 3350 17 Gram(s) Oral two times a day  potassium chloride    Tablet ER 20 milliEquivalent(s) Oral daily  senna 2 Tablet(s) Oral at bedtime  sucralfate 1 Gram(s) Oral four times a day  tamsulosin 0.4 milliGRAM(s) Oral at bedtime    MEDICATIONS  (PRN):  bisacodyl Suppository 10 milliGRAM(s) Rectal daily PRN Constipation  dextrose 40% Gel 15 Gram(s) Oral once PRN Blood Glucose LESS THAN 70 milliGRAM(s)/deciliter  glucagon  Injectable 1 milliGRAM(s) IntraMuscular once PRN Glucose LESS THAN 70 milligrams/deciliter  simethicone 80 milliGRAM(s) Chew four times a day PRN Upset Stomach  traMADol 25 milliGRAM(s) Oral every 12 hours PRN Moderate Pain (4 - 6)      Allergies: Allergies    fish (Hives)  No Known Drug Allergies        REVIEW OF SYSTEMS:  CONSTITUTIONAL: No fever  NECK: No pain or stiffness  RESPIRATORY: No cough, wheezing, chills or hemoptysis; No shortness of breath  CARDIOVASCULAR: No current chest pain/ see HPI , palpitations, dizziness, or leg swelling  GASTROINTESTINAL: unchanged abdominal or epigastric pain. No nausea, vomiting, or hematemesis; No diarrhea or constipation. No melena or hematochezia.  GENITOURINARY: No dysuria, frequency, hematuria, or incontinence  NEUROLOGICAL: No headaches  SKIN: No itching, burning, rashes, or lesions   ENDOCRINE: No heat or cold intolerance; No hair loss  MUSCULOSKELETAL: No joint pain or swelling    T(C): 36.9 (08-09-19 @ 12:57), Max: 37.2 (08-08-19 @ 20:40)  HR: 57 (08-09-19 @ 12:57) (57 - 78)  BP: 121/74 (08-09-19 @ 12:57) (119/70 - 149/79)  RR: 18 (08-09-19 @ 12:57) (18 - 18)  SpO2: 99% (08-09-19 @ 12:57) (96% - 100%)  Wt(kg): --Vital Signs Last 24 Hrs  T(C): 36.9 (09 Aug 2019 12:57), Max: 37.2 (08 Aug 2019 20:40)  T(F): 98.4 (09 Aug 2019 12:57), Max: 98.9 (08 Aug 2019 20:40)  HR: 57 (09 Aug 2019 12:57) (57 - 78)  BP: 121/74 (09 Aug 2019 12:57) (119/70 - 149/79)  BP(mean): --  RR: 18 (09 Aug 2019 12:57) (18 - 18)  SpO2: 99% (09 Aug 2019 12:57) (96% - 100%)  I&O's Summary    08 Aug 2019 07:01  -  09 Aug 2019 07:00  --------------------------------------------------------  IN: 0 mL / OUT: 3340 mL / NET: -3340 mL        PHYSICAL EXAM:  GENERAL: NAD, well-groomed, well-developed  HEAD:  Atraumatic, Normocephalic  NECK: Supple, No JVD, Normal thyroid  NERVOUS SYSTEM:  Alert & Oriented X3, Good concentration  CHEST/LUNG: Clear to percussion bilaterally; No rales, rhonchi, wheezing, or rubs  HEART: Regular rate and rhythm; No murmurs, rubs, or gallops  ABDOMEN: Soft, pos mild discomfort on Deep palpation, Nondistended; Bowel sounds present    Consultant(s) Notes Reviewed:  [x ] YES  [ ] NO  Care Discussed with Consultants/Other Providers [ x] YES  [ ] NO  Name of Consultant  LABS:                        9.2    8.3   )-----------( 362      ( 09 Aug 2019 06:50 )             29.2     08-09    140  |  101  |  20  ----------------------------<  171<H>  3.7   |  26  |  1.01    Ca    9.5      09 Aug 2019 07:19          CAPILLARY BLOOD GLUCOSE      POCT Blood Glucose.: 172 mg/dL (09 Aug 2019 10:17)  POCT Blood Glucose.: 263 mg/dL (08 Aug 2019 22:07)  POCT Blood Glucose.: 226 mg/dL (08 Aug 2019 18:00)            RADIOLOGY & ADDITIONAL TESTS:  EKG :     Imaging Personally Reviewed:  [ ] YES  [ ] NO  HEALTH ISSUES - PROBLEM Dx:  Constipation, unspecified constipation type: Constipation, unspecified constipation type  Anemia, unspecified type: Anemia, unspecified type  PAF (paroxysmal atrial fibrillation): PAF (paroxysmal atrial fibrillation)  Urinary retention: Urinary retention  Chronic gastric ulcer with perforation: Chronic gastric ulcer with perforation  Essential hypertension: Essential hypertension  Lower abdominal pain: Lower abdominal pain  Chronic pulmonary embolism without acute cor pulmonale, unspecified pulmonary embolism type: Chronic pulmonary embolism without acute cor pulmonale, unspecified pulmonary embolism type  DM type 2, goal HbA1c < 7%: DM type 2, goal HbA1c &lt; 7%  Coronary artery disease involving native coronary artery of native heart, angina presence unspecified: Coronary artery disease involving native coronary artery of native heart, angina presence unspecified  ACP (advance care planning): ACP (advance care planning)  CAD (coronary artery disease): CAD (coronary artery disease)  Constipation: Constipation  Anemia: Anemia

## 2019-08-09 NOTE — PROVIDER CONTACT NOTE (OTHER) - BACKGROUND
Admitted for CP. Cath preformed with stent to RCA. PMHx of OM, PE, DM2, Afib, HTN.
Dx Chest pain  Pt on amiodarone, apixaban, aspirin, and plavix
S/P Cardiac Cath and stent

## 2019-08-09 NOTE — PROGRESS NOTE ADULT - SUBJECTIVE AND OBJECTIVE BOX
INTERVAL HPI/OVERNIGHT EVENTS:    pt seen and examined.   tolerating PO well, denies n/v.  + bm yesterday     MEDICATIONS  (STANDING):  amiodarone    Tablet 100 milliGRAM(s) Oral <User Schedule>  apixaban 5 milliGRAM(s) Oral two times a day  aspirin enteric coated 81 milliGRAM(s) Oral daily  atorvastatin 80 milliGRAM(s) Oral at bedtime  clopidogrel Tablet 75 milliGRAM(s) Oral daily  dextrose 5%. 1000 milliLiter(s) (50 mL/Hr) IV Continuous <Continuous>  dextrose 50% Injectable 12.5 Gram(s) IV Push once  dextrose 50% Injectable 25 Gram(s) IV Push once  dextrose 50% Injectable 25 Gram(s) IV Push once  docusate sodium 100 milliGRAM(s) Oral three times a day  furosemide    Tablet 40 milliGRAM(s) Oral daily  insulin glargine Injectable (LANTUS) 24 Unit(s) SubCutaneous at bedtime  insulin lispro (HumaLOG) corrective regimen sliding scale   SubCutaneous three times a day before meals  insulin lispro (HumaLOG) corrective regimen sliding scale   SubCutaneous at bedtime  insulin lispro Injectable (HumaLOG) 2 Unit(s) SubCutaneous three times a day before meals  pantoprazole    Tablet 40 milliGRAM(s) Oral two times a day  polyethylene glycol 3350 17 Gram(s) Oral two times a day  potassium chloride    Tablet ER 20 milliEquivalent(s) Oral daily  senna 2 Tablet(s) Oral at bedtime  sucralfate 1 Gram(s) Oral four times a day  tamsulosin 0.4 milliGRAM(s) Oral at bedtime    MEDICATIONS  (PRN):  bisacodyl Suppository 10 milliGRAM(s) Rectal daily PRN Constipation  dextrose 40% Gel 15 Gram(s) Oral once PRN Blood Glucose LESS THAN 70 milliGRAM(s)/deciliter  glucagon  Injectable 1 milliGRAM(s) IntraMuscular once PRN Glucose LESS THAN 70 milligrams/deciliter  simethicone 80 milliGRAM(s) Chew four times a day PRN Upset Stomach  traMADol 25 milliGRAM(s) Oral every 12 hours PRN Moderate Pain (4 - 6)      Allergies    fish (Hives)  No Known Drug Allergies    Intolerances        Review of Systems:    General:  No wt loss, fevers, chills, night sweats,fatigue,   Eyes:  Good vision, no reported pain  ENT:  No sore throat, pain, runny nose, dysphagia  CV:  No pain, palpitations, hypo/hypertension  Resp:  No dyspnea, cough, tachypnea, wheezing  GI:  No pain, No nausea, No vomiting, No diarrhea, No constipation, No weight loss, No fever, No pruritis, No rectal bleeding, No melena, No dysphagia  :  No pain, bleeding, incontinence, nocturia  Muscle:  No pain, weakness  Neuro:  No weakness, tingling, memory problems  Psych:  No fatigue, insomnia, mood problems, depression  Endocrine:  No polyuria, polydypsia, cold/heat intolerance  Heme:  No petechiae, ecchymosis, easy bruisability  Skin:  No rash, tattoos, scars, edema      Vital Signs Last 24 Hrs  T(C): 37 (09 Aug 2019 06:45), Max: 37.3 (08 Aug 2019 13:26)  T(F): 98.6 (09 Aug 2019 06:45), Max: 99.1 (08 Aug 2019 13:26)  HR: 60 (09 Aug 2019 09:05) (59 - 78)  BP: 133/75 (09 Aug 2019 09:05) (119/70 - 149/79)  BP(mean): --  RR: 18 (09 Aug 2019 06:45) (18 - 18)  SpO2: 99% (09 Aug 2019 06:45) (96% - 100%)    PHYSICAL EXAM:    Constitutional: NAD, well-developed  HEENT: EOMI, throat clear  Neck: No LAD, supple  Respiratory: CTA and P  Cardiovascular: S1 and S2, RRR, no M  Gastrointestinal: BS+, soft, NT/ND, neg HSM,  Extremities: No peripheral edema, neg clubing, cyanosis  Vascular: 2+ peripheral pulses  Neurological: A/O x 3, no focal deficits  Psychiatric: Normal mood, normal affect  Skin: No rashes      LABS:                        9.2    8.3   )-----------( 362      ( 09 Aug 2019 06:50 )             29.2     08-09    140  |  101  |  20  ----------------------------<  171<H>  3.7   |  26  |  1.01    Ca    9.5      09 Aug 2019 07:19            RADIOLOGY & ADDITIONAL TESTS:

## 2019-08-09 NOTE — PROGRESS NOTE ADULT - PROBLEM SELECTOR PROBLEM 4
Chronic pulmonary embolism without acute cor pulmonale, unspecified pulmonary embolism type
Lower abdominal pain
ACP (advance care planning)
ACP (advance care planning)
Chronic pulmonary embolism without acute cor pulmonale, unspecified pulmonary embolism type

## 2019-08-09 NOTE — CHART NOTE - NSCHARTNOTEFT_GEN_A_CORE
SARAH MORRISON  49y Male  Patient is a 49y old  Male who presents with a chief complaint of chest pain, cad (09 Aug 2019 10:19)     Event Summary:  Patient with initial c/o atypical CP, now subsiding, was evaluated earlier by primary team & Cardiology. EKG at that time with no evidence of acute ischemia, high sensitivity trop 49-54, with normal CK & CK-MB.  -Reviewed results with Cardiologist-Dr. France, who endorses patient at baseline always has a low level troponin leak. Recommends discharging patient on Imdur 30mg PO daily for anginal pain, and given metal stent placement, recommends d'cing ASA now, and continuing Plavix & Eliquis for discharge. He is cleared from cardiac standpoint for discharge with close outpatient follow-up.     -Hospitalist made aware of recommendations and agrees with plan to discharge.      Nathaniel [facility]  #[phone]

## 2019-08-09 NOTE — PROGRESS NOTE ADULT - PROBLEM SELECTOR PLAN 6
ON Furosemide oral
ON Furosemide
ON Furosemide
ON Furosemide oral
ON Furosemide oral
ON Furosemide
S/P  emergent ex-lap omentopexy and plication in 5/2019  COnt PPI   GI is following   Monitor H/H

## 2019-08-09 NOTE — PROGRESS NOTE ADULT - PROVIDER SPECIALTY LIST ADULT
Cardiology
Gastroenterology
Hospitalist
Cardiology
Hospitalist

## 2019-08-09 NOTE — PROGRESS NOTE ADULT - PROBLEM SELECTOR PROBLEM 3
DM type 2, goal HbA1c < 7%
CAD (coronary artery disease)
Chronic pulmonary embolism without acute cor pulmonale, unspecified pulmonary embolism type
DM type 2, goal HbA1c < 7%

## 2019-08-09 NOTE — PROGRESS NOTE ADULT - ATTENDING COMMENTS
given recent PCI not candidate for endoscopic eval unless emergent  if hgb cont to decline or witnessed overt gi bleed would hold eliquis
Will try Lactulose today in hope of a BM     Ashley Clay   Hospitalist   
Ashley Clay   Hospitalist   
Ashley Clay   hospitalist   
Please note cardiology recommendation / pt will f/up with cardiologist within one week of hosp dc and f/up with GI.  Pt is to f/up with  .   was consulted about home care services .      Ashley Clay   Hospitalist   [phone]
Ashley Clay   Hospitalist   
Ashley Clay   Hospitalist

## 2019-08-09 NOTE — PROGRESS NOTE ADULT - PROBLEM SELECTOR PROBLEM 1
Anemia, unspecified type
Anemia
Anemia, unspecified type
Coronary artery disease involving native coronary artery of native heart, angina presence unspecified
Anemia, unspecified type
Anemia, unspecified type

## 2019-08-09 NOTE — PROVIDER CONTACT NOTE (OTHER) - SITUATION
Pt c/o of CP
Pt had 4 beats of wide complex now
Notified by the tele tech that patient had PAF with the HR in 140's for 4.62 seconds

## 2019-08-09 NOTE — PROVIDER CONTACT NOTE (OTHER) - ACTION/TREATMENT ORDERED:
No orders at this time. Will continue to monitor.
Monitoring continued. Pt safety maintained.
NP notified. EKG to be performed and STAT Trop to be drawn. Will continue to monitor.

## 2019-08-09 NOTE — PROGRESS NOTE ADULT - PROBLEM SELECTOR PLAN 8
Pt failed TOV / Aguilera is replaced / As per Conversation with the  team, pt will be followed up at the outpatient  clinic and outpatient TOV.   cont  flomax    monitor urine output
With jacques catheter in place   Has been on Flomax   Will consider TOV or urology evaluation in AM
TOV today   monitor urine output
Pt failed TOV / Aguilera is replaced / As per Conversation with the  team, pt will be followed up at the outpatient  clinic and outpatient TOV.   cont  flomax    monitor urine output
COnt Amiodarone   rate is controlled   monitor LFT / TFT   on ELiquis

## 2019-08-09 NOTE — PROGRESS NOTE ADULT - PROBLEM SELECTOR PLAN 1
- hgb remains stable  - occult negative stool  - continue ppi/carafate  - monitor cbc daily transfuse prbcs as needed  - no gi objection to triple therapy   - monitor stool color closely  - given recent PCI not candidate for endoscopic eval unless emergent  if hgb cont to decline or witnessed overt gi bleed would hold eliquis

## 2019-08-09 NOTE — PROGRESS NOTE ADULT - PROBLEM SELECTOR PROBLEM 5
Lower abdominal pain
Lower abdominal pain
Essential hypertension
Lower abdominal pain

## 2019-08-09 NOTE — PROGRESS NOTE ADULT - PROBLEM SELECTOR PLAN 1
- continue ppi/carafate.   - Case was discussed with Dr Lopez on 8/7 , no plan for EGD or Colonoscopy since pt recently had cardiac stent , and patient is to f/up as outpatient with GI post dc. Since pt states to have black stool ,will get Occult testing and case was discussed with the GI NP today who states that a decision will be made based on the occult results   - Hemoglobin is stable /guaic is negative   - his baseline hemo has been 8-12 with some record of 7 - continue ppi/carafate.   - Case was discussed with Dr Lopez on 8/7 , no plan for EGD or Colonoscopy since pt recently had cardiac stent , and patient is to f/up as outpatient with GI post dc. Since pt states to have black stool ,will get Occult testing and case was discussed with the GI NP today who states that a decision will be made based on the occult results   - Hemoglobin is stable /guaic is negative   - his baseline hemo has been 8-12 with some record of 7  DC time 45mns

## 2019-08-09 NOTE — PROGRESS NOTE ADULT - PROBLEM SELECTOR PLAN 7
Per protocol  
S/P  emergent ex-lap omentopexy and plication in 5/2019  Cont PPI   GI is following   Monitor H/H currently stable
S/P  emergent ex-lap omentopexy and plication in 5/2019  Cont PPI   GI is following   Monitor H/H
S/P  emergent ex-lap omentopexy and plication in 5/2019  Cont PPI   GI is following   Monitor H/H currently stable
S/P  emergent ex-lap omentopexy and plication in 5/2019  Cont PPI   GI is following   Monitor H/H
With jacques catheter in place   Has been on Flomax   Will consider TOV or urology evaluation in AM

## 2019-08-09 NOTE — PROGRESS NOTE ADULT - PROBLEM SELECTOR PLAN 4
LELE Varner
Advanced care planning was discussed with patient and family.  Advanced care planning forms were reviewed and discussed.  Risks, benefits and alternatives of gastroenterologic procedures were discussed in detail and all questions were answered.
Advanced care planning was discussed with patient and family.  Advanced care planning forms were reviewed and discussed.  Risks, benefits and alternatives of gastroenterologic procedures were discussed in detail and all questions were answered.
LELE Varner
Unsure of Etiology   Tramadol for pain   ABd US is ordered   F/up report   Monitor LFTs

## 2019-08-11 ENCOUNTER — EMERGENCY (EMERGENCY)
Facility: HOSPITAL | Age: 49
LOS: 1 days | Discharge: HOME | End: 2019-08-11
Attending: EMERGENCY MEDICINE | Admitting: EMERGENCY MEDICINE
Payer: COMMERCIAL

## 2019-08-11 VITALS
WEIGHT: 190.04 LBS | DIASTOLIC BLOOD PRESSURE: 70 MMHG | TEMPERATURE: 99 F | OXYGEN SATURATION: 98 % | HEART RATE: 89 BPM | RESPIRATION RATE: 18 BRPM | SYSTOLIC BLOOD PRESSURE: 136 MMHG

## 2019-08-11 DIAGNOSIS — Z98.890 OTHER SPECIFIED POSTPROCEDURAL STATES: Chronic | ICD-10-CM

## 2019-08-11 DIAGNOSIS — S98.912A COMPLETE TRAUMATIC AMPUTATION OF LEFT FOOT, LEVEL UNSPECIFIED, INITIAL ENCOUNTER: Chronic | ICD-10-CM

## 2019-08-11 DIAGNOSIS — K25.5 CHRONIC OR UNSPECIFIED GASTRIC ULCER WITH PERFORATION: Chronic | ICD-10-CM

## 2019-08-11 PROCEDURE — 73562 X-RAY EXAM OF KNEE 3: CPT

## 2019-08-11 PROCEDURE — 73562 X-RAY EXAM OF KNEE 3: CPT | Mod: 26,RT

## 2019-08-11 PROCEDURE — 99283 EMERGENCY DEPT VISIT LOW MDM: CPT | Mod: 25

## 2019-08-11 PROCEDURE — 99283 EMERGENCY DEPT VISIT LOW MDM: CPT

## 2019-08-11 RX ORDER — OXYCODONE HYDROCHLORIDE 5 MG/1
2 TABLET ORAL
Qty: 24 | Refills: 0
Start: 2019-08-11 | End: 2019-08-13

## 2019-08-11 RX ORDER — OXYCODONE AND ACETAMINOPHEN 5; 325 MG/1; MG/1
2 TABLET ORAL ONCE
Refills: 0 | Status: DISCONTINUED | OUTPATIENT
Start: 2019-08-11 | End: 2019-08-11

## 2019-08-11 RX ADMIN — OXYCODONE AND ACETAMINOPHEN 2 TABLET(S): 5; 325 TABLET ORAL at 18:07

## 2019-08-11 NOTE — ED PROVIDER NOTE - FAMILY HISTORY
FH: stroke, Father     Father  Still living? Unknown  FH: coronary artery disease, Age at diagnosis: Age Unknown     Mother  Still living? Unknown  FH: pulmonary embolism, Age at diagnosis: Age Unknown

## 2019-08-11 NOTE — ED PROVIDER NOTE - OBJECTIVE STATEMENT
Pt is a 50 yo male awoke this am with right knee pain and locking, severe, no trauma, no color change, numbness, weakness but pain radiates to leg and unable to ambulate. no hip pain, back pain, weaknesss, color change or any other sx.  pt states pain 8/10    no ortho

## 2019-08-11 NOTE — ED PROVIDER NOTE - MUSCULOSKELETAL, MLM
Spine appears normal, range of motion is limited in right knee by pain, quad and patella tendon intact, no deformity, ttp over area of medial meniscus, no ligamentous laxity, 2+ pulses pt and dp.  sensory intact, hip nt, no calf tttp

## 2019-08-11 NOTE — ED ADULT NURSE NOTE - OBJECTIVE STATEMENT
50 y/o male with cardiac stent placement on 8/2 on Eliquis, indwelling urinary cath placement on 8/5 due to urinary retention, DM2 and neuropathy presents to ED with right knee pain, right lower leg numbness and bleeding from right second toe. Unsure if injury occurred because of his neuropathy. Denies CP, SOB. Skin cool, dry and appropriate color for ethnicity.  Peripheral pulses present and equal bilaterally. VSS.

## 2019-08-11 NOTE — ED PROVIDER NOTE - CLINICAL SUMMARY MEDICAL DECISION MAKING FREE TEXT BOX
pt with knee pain and locking, no trauma, nv intact, 2+ pulses, xray  with djd, no fx, likely meniscus, knee immobilizer, pain meds as pt with cad and on eloquis and plavix with avoid nsaids, heat to knee, fu ortho

## 2019-08-11 NOTE — ED ADULT NURSE NOTE - NSIMPLEMENTINTERV_GEN_ALL_ED
Implemented All Fall Risk Interventions:  [place] to call system. Call bell, personal items and telephone within reach. Instruct patient to call for assistance. Room bathroom lighting operational. Non-slip footwear when patient is off stretcher. Physically safe environment: no spills, clutter or unnecessary equipment. Stretcher in lowest position, wheels locked, appropriate side rails in place. Provide visual cue, wrist band, yellow gown, etc. Monitor gait and stability. Monitor for mental status changes and reorient to person, place, and time. Review medications for side effects contributing to fall risk. Reinforce activity limits and safety measures with patient and family.

## 2019-08-11 NOTE — ED PROVIDER NOTE - PSH
H/O abdominal surgery    Perforated gastric ulcer    Traumatic amputation of left foot, initial encounter

## 2019-08-11 NOTE — ED ADULT TRIAGE NOTE - CHIEF COMPLAINT QUOTE
Pt reports that today he felt extreme right knee pain and that he is having trouble standing on the right leg now, and has numbness to the right lower leg. Pt has a temporary jacques in place due to urinary retention.

## 2019-08-11 NOTE — ED PROVIDER NOTE - PROGRESS NOTE DETAILS
pain improved with percocet and immobilizer, walking with walker , has one at home, d/c and fu ortho

## 2019-08-14 ENCOUNTER — INPATIENT (INPATIENT)
Facility: HOSPITAL | Age: 49
LOS: 6 days | Discharge: NURSING, REHAB OR LONG-TERM CARE | DRG: 63 | End: 2019-08-21
Attending: INTERNAL MEDICINE | Admitting: INTERNAL MEDICINE
Payer: COMMERCIAL

## 2019-08-14 VITALS
HEART RATE: 80 BPM | DIASTOLIC BLOOD PRESSURE: 74 MMHG | RESPIRATION RATE: 15 BRPM | SYSTOLIC BLOOD PRESSURE: 121 MMHG | OXYGEN SATURATION: 97 % | TEMPERATURE: 100 F | WEIGHT: 195.11 LBS

## 2019-08-14 DIAGNOSIS — K25.5 CHRONIC OR UNSPECIFIED GASTRIC ULCER WITH PERFORATION: Chronic | ICD-10-CM

## 2019-08-14 DIAGNOSIS — S98.912A COMPLETE TRAUMATIC AMPUTATION OF LEFT FOOT, LEVEL UNSPECIFIED, INITIAL ENCOUNTER: Chronic | ICD-10-CM

## 2019-08-14 DIAGNOSIS — I63.9 CEREBRAL INFARCTION, UNSPECIFIED: ICD-10-CM

## 2019-08-14 DIAGNOSIS — Z98.890 OTHER SPECIFIED POSTPROCEDURAL STATES: Chronic | ICD-10-CM

## 2019-08-14 LAB
ALBUMIN SERPL ELPH-MCNC: 3 G/DL — LOW (ref 3.3–5)
ALP SERPL-CCNC: 137 U/L — HIGH (ref 40–120)
ALT FLD-CCNC: 16 U/L — SIGNIFICANT CHANGE UP (ref 12–78)
ANION GAP SERPL CALC-SCNC: 10 MMOL/L — SIGNIFICANT CHANGE UP (ref 5–17)
APTT BLD: 32.2 SEC — SIGNIFICANT CHANGE UP (ref 28.5–37)
AST SERPL-CCNC: 17 U/L — SIGNIFICANT CHANGE UP (ref 15–37)
BASOPHILS # BLD AUTO: 0.03 K/UL — SIGNIFICANT CHANGE UP (ref 0–0.2)
BASOPHILS NFR BLD AUTO: 0.3 % — SIGNIFICANT CHANGE UP (ref 0–2)
BILIRUB SERPL-MCNC: 0.2 MG/DL — SIGNIFICANT CHANGE UP (ref 0.2–1.2)
BUN SERPL-MCNC: 15 MG/DL — SIGNIFICANT CHANGE UP (ref 7–23)
CALCIUM SERPL-MCNC: 9 MG/DL — SIGNIFICANT CHANGE UP (ref 8.5–10.1)
CHLORIDE SERPL-SCNC: 104 MMOL/L — SIGNIFICANT CHANGE UP (ref 96–108)
CO2 SERPL-SCNC: 27 MMOL/L — SIGNIFICANT CHANGE UP (ref 22–31)
CREAT SERPL-MCNC: 1.1 MG/DL — SIGNIFICANT CHANGE UP (ref 0.5–1.3)
EOSINOPHIL # BLD AUTO: 0.82 K/UL — HIGH (ref 0–0.5)
EOSINOPHIL NFR BLD AUTO: 8 % — HIGH (ref 0–6)
GLUCOSE SERPL-MCNC: 296 MG/DL — HIGH (ref 70–99)
HCT VFR BLD CALC: 31.1 % — LOW (ref 39–50)
HGB BLD-MCNC: 9.4 G/DL — LOW (ref 13–17)
IMM GRANULOCYTES NFR BLD AUTO: 0.2 % — SIGNIFICANT CHANGE UP (ref 0–1.5)
INR BLD: 1.05 RATIO — SIGNIFICANT CHANGE UP (ref 0.88–1.16)
LYMPHOCYTES # BLD AUTO: 1.65 K/UL — SIGNIFICANT CHANGE UP (ref 1–3.3)
LYMPHOCYTES # BLD AUTO: 16.1 % — SIGNIFICANT CHANGE UP (ref 13–44)
MCHC RBC-ENTMCNC: 26.8 PG — LOW (ref 27–34)
MCHC RBC-ENTMCNC: 30.2 GM/DL — LOW (ref 32–36)
MCV RBC AUTO: 88.6 FL — SIGNIFICANT CHANGE UP (ref 80–100)
MONOCYTES # BLD AUTO: 0.86 K/UL — SIGNIFICANT CHANGE UP (ref 0–0.9)
MONOCYTES NFR BLD AUTO: 8.4 % — SIGNIFICANT CHANGE UP (ref 2–14)
NEUTROPHILS # BLD AUTO: 6.9 K/UL — SIGNIFICANT CHANGE UP (ref 1.8–7.4)
NEUTROPHILS NFR BLD AUTO: 67 % — SIGNIFICANT CHANGE UP (ref 43–77)
NRBC # BLD: 0 /100 WBCS — SIGNIFICANT CHANGE UP (ref 0–0)
PLATELET # BLD AUTO: 459 K/UL — HIGH (ref 150–400)
POTASSIUM SERPL-MCNC: 4.4 MMOL/L — SIGNIFICANT CHANGE UP (ref 3.5–5.3)
POTASSIUM SERPL-SCNC: 4.4 MMOL/L — SIGNIFICANT CHANGE UP (ref 3.5–5.3)
PROT SERPL-MCNC: 7 G/DL — SIGNIFICANT CHANGE UP (ref 6–8.3)
PROTHROM AB SERPL-ACNC: 11.9 SEC — SIGNIFICANT CHANGE UP (ref 10–12.9)
RBC # BLD: 3.51 M/UL — LOW (ref 4.2–5.8)
RBC # FLD: 13.7 % — SIGNIFICANT CHANGE UP (ref 10.3–14.5)
SODIUM SERPL-SCNC: 141 MMOL/L — SIGNIFICANT CHANGE UP (ref 135–145)
WBC # BLD: 10.28 K/UL — SIGNIFICANT CHANGE UP (ref 3.8–10.5)
WBC # FLD AUTO: 10.28 K/UL — SIGNIFICANT CHANGE UP (ref 3.8–10.5)

## 2019-08-14 PROCEDURE — 70450 CT HEAD/BRAIN W/O DYE: CPT | Mod: 26,59

## 2019-08-14 PROCEDURE — 70496 CT ANGIOGRAPHY HEAD: CPT | Mod: 26

## 2019-08-14 PROCEDURE — 99291 CRITICAL CARE FIRST HOUR: CPT

## 2019-08-14 PROCEDURE — 93010 ELECTROCARDIOGRAM REPORT: CPT

## 2019-08-14 PROCEDURE — 71045 X-RAY EXAM CHEST 1 VIEW: CPT | Mod: 26

## 2019-08-14 PROCEDURE — 70498 CT ANGIOGRAPHY NECK: CPT | Mod: 26

## 2019-08-14 RX ORDER — ALTEPLASE 100 MG
8.4 KIT INTRAVENOUS ONCE
Refills: 0 | Status: COMPLETED | OUTPATIENT
Start: 2019-08-14 | End: 2019-08-14

## 2019-08-14 RX ORDER — ALTEPLASE 100 MG
75.3 KIT INTRAVENOUS ONCE
Refills: 0 | Status: COMPLETED | OUTPATIENT
Start: 2019-08-14 | End: 2019-08-14

## 2019-08-14 RX ADMIN — ALTEPLASE 75.3 MILLIGRAM(S): KIT at 22:09

## 2019-08-14 RX ADMIN — ALTEPLASE 504 MILLIGRAM(S): KIT at 22:08

## 2019-08-14 NOTE — ED ADULT NURSE NOTE - NSIMPLEMENTINTERV_GEN_ALL_ED
Implemented All Fall Risk Interventions:  [place] to call system. Call bell, personal items and telephone within reach. Instruct patient to call for assistance. Room bathroom lighting operational. Non-slip footwear when patient is off stretcher. Physically safe environment: no spills, clutter or unnecessary equipment. Stretcher in lowest position, wheels locked, appropriate side rails in place. Provide visual cue, wrist band, yellow gown, etc. Monitor gait and stability. Monitor for mental status changes and reorient to person, place, and time. Review medications for side effects contributing to fall risk. Reinforce activity limits and safety measures with patient and family.

## 2019-08-14 NOTE — H&P ADULT - PROBLEM SELECTOR PLAN 1
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 Jon, neuro, consulted in ED, f/u recs  -further management as per ICU

## 2019-08-14 NOTE — H&P ADULT - NSICDXFAMILYHX_GEN_ALL_CORE_FT
FAMILY HISTORY:  FH: coronary artery disease, Father  FH: pulmonary embolism, Mother  FH: stroke, Father

## 2019-08-14 NOTE — ED PROVIDER NOTE - PROGRESS NOTE DETAILS
ct no heme, cta no occlusion per radiologist called [place] stroke neuro per stroke coordinatorcaite stroke neurologist refusing to get on line, relates must call d/w dr scott ([place] stroke neuro) recommends TPA per stroke coordinator, [place] stroke neurologist (sonia)  refusing to get on line, relates must call neurologist who comes to [place], if no response, must call medical director pt passed dysphagia screen

## 2019-08-14 NOTE — ED PROVIDER NOTE - OBJECTIVE STATEMENT
pt c/o right sided weakness and numbness slurred speech beginning at 1800 per family. no fever, ha, cp, sob, abd pain, vomiting. pt c/o right sided weakness and numbness slurred speech beginning at 1800 per friend. no fever, ha, cp, sob, abd pain, vomiting. pt c/o right sided weakness and numbness slurred speech beginning at 1800 per friend. no fever, ha, cp, sob, abd pain, vomiting. pt's slurred speech resolved pta, but pt continued to have right sided weakness and numbness. pt relates is on eliquis, but no doses past 5 days, because ran out, insurance wont pay for refill yet.  pmd - bayron rothman

## 2019-08-14 NOTE — H&P ADULT - NSICDXPASTSURGICALHX_GEN_ALL_CORE_FT
PAST SURGICAL HISTORY:  H/O abdominal surgery     Perforated gastric ulcer     Traumatic amputation of left foot, initial encounter

## 2019-08-14 NOTE — H&P ADULT - PROBLEM SELECTOR PLAN 9
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   No major vessel occlusion or hemodynamically significant stenosis.   -f/u outpatient, no acute intervention

## 2019-08-14 NOTE — CONSULT NOTE ADULT - SUBJECTIVE AND OBJECTIVE BOX
Patient is a 49y old  Male who presents with a chief complaint of   24 hour events: Pt seen and examined at bedside. Chart/labs reviewed.   PAST MEDICAL & SURGICAL HISTORY:  Osteomyelitis: s/p debridement  History of non-ST elevation myocardial infarction (NSTEMI)  Pulmonary embolism  Perforated gastric ulcer: s/p emergent ex-lap omentopexy and plication 6/2019  BPH (benign prostatic hyperplasia)  Hypertension  Afib  Diabetes mellitus with no complication  Diabetes  Traumatic amputation of left foot, initial encounter  Perforated gastric ulcer  H/O abdominal surgery      Review of Systems:  Constitutional: No fever, chills, fatigue  Neuro: No headache, numbness, weakness  Resp: No cough, wheezing, shortness of breath  CVS: No chest pain, palpitations, leg swelling  GI: No abdominal pain, nausea, vomiting, diarrhea   : No dysuria, frequency, incontinence  Skin: No itching, burning, rashes, or lesions   Msk: No joint pain or swelling  Psych: No depression, anxiety, mood swings    T(F): 99.6 (08-14-19 @ 20:55), Max: 99.6 (08-14-19 @ 20:55)  HR: 72 (08-14-19 @ 22:08) (72 - 80)  BP: 151/73 (08-14-19 @ 22:13) (121/74 - 151/73)  RR: 18 (08-14-19 @ 22:13) (15 - 18)  SpO2: 100% (08-14-19 @ 22:13) (97% - 100%)  Wt(kg): --        CAPILLARY BLOOD GLUCOSE      POCT Blood Glucose.: 304 mg/dL (14 Aug 2019 21:07)      I&O's Summary      Physical Exam:     Gen:   Neuro: A&Ox3, non-focal  HEENT: NC/AT  Resp: CTA b/l  CVS: nl S1/S2, RRR  Abd: soft, nt, nd, +bs  Ext: no edema, +pulses  Skin: well perfused, warm    Meds:              alteplase    IVPB IV Intermittent                                        9.4    10.28 )-----------( 459      ( 14 Aug 2019 21:17 )             31.1       08-14    141  |  104  |  15  ----------------------------<  296<H>  4.4   |  27  |  1.10    Ca    9.0      14 Aug 2019 21:17    TPro  7.0  /  Alb  3.0<L>  /  TBili  0.2  /  DBili  x   /  AST  17  /  ALT  16  /  AlkPhos  137<H>  08-14          PT/INR - ( 14 Aug 2019 21:17 )   PT: 11.9 sec;   INR: 1.05 ratio         PTT - ( 14 Aug 2019 21:17 )  PTT:32.2 sec            CXR:    CTH:    CT Chest:    CT A/P:    TTE:        CENTRAL LINE: yes/no    RODRIGUEZ: yes/no    A-LINE: yes/no    GLOBAL ISSUE/BEST PRACTICE:  Analgesia:  Sedation:  HOB elevation: yes  Stress ulcer prophylaxis:  VTE prophylaxis:  Glycemic control:  Nutrition:      CODE STATUS: ***  [facility] discussion: Y  Critical care time spent (mins): ***  (Reviewing data, imaging, discussing with multidisciplinary team, non inclusive of procedures, discussing goals of care with patient/family) In Brief:  49M with PMHx of cad s/p stent to RCA 8/2/19, NSTEMI, perforated antral ulcer s/p emergent ex-lap, omentopexy and plication 5/2019 (Dr. Mejia), afib, recent PE (on eliquis), dm, diabetic neuropathy, OM of left great toe s/p amputation, htn, who presented to ED with c/p slurred speech and right sided weakness. Symptoms started around 6 pm tonight. CTH did not show any acute CVA/bleed but did reveal "3.7 x 4.1 x 5.4 mm lobulated right MCA bifurcation aneurysm." Case D/W [facility] by ED Physician and recommended to give tPA. ICU now consulted for post tPA management    24 hour events: Pt seen and examined at bedside. Chart/labs reviewed. Slurred speech improved. Remains with right sided weakness    PAST MEDICAL & SURGICAL HISTORY:  Osteomyelitis: s/p debridement  History of non-ST elevation myocardial infarction (NSTEMI)  Pulmonary embolism  Perforated gastric ulcer: s/p emergent ex-lap omentopexy and plication 6/2019  BPH (benign prostatic hyperplasia)  Hypertension  Afib  Diabetes mellitus with no complication  Diabetes  Traumatic amputation of left foot, initial encounter  Perforated gastric ulcer  H/O abdominal surgery      Review of Systems:  Constitutional: No fever, chills, fatigue  Neuro: No headache, +weakness, + slurred speech   Resp: No cough, wheezing, shortness of breath  CVS: No chest pain, palpitations, leg swelling  GI: No abdominal pain, nausea, vomiting, diarrhea   : No dysuria, frequency, incontinence  Skin: No itching, burning, rashes, or lesions   Msk: No joint pain or swelling  Psych: No depression, anxiety, mood swings      T(F): 99.6 (08-14-19 @ 20:55), Max: 99.6 (08-14-19 @ 20:55)  HR: 72 (08-14-19 @ 22:08) (72 - 80)  BP: 151/73 (08-14-19 @ 22:13) (121/74 - 151/73)  RR: 18 (08-14-19 @ 22:13) (15 - 18)  SpO2: 100% (08-14-19 @ 22:13) (97% - 100%)  Wt(kg): --        CAPILLARY BLOOD GLUCOSE      POCT Blood Glucose.: 304 mg/dL (14 Aug 2019 21:07)        Physical Exam:     Gen: No distress  Neuro: A&Ox3, Right sided weakness.   HEENT: NC/AT, Poor dentition  Resp: CTA b/l  CVS: nl S1/S2, RRR  Abd: soft, nt, nd, +bs  Ext: no edema, +pulses. Left Great toe amputation  Skin: well perfused, warm      Meds:    alteplase    IVPB IV Intermittent                                9.4    10.28 )-----------( 459      ( 14 Aug 2019 21:17 )             31.1       08-14    141  |  104  |  15  ----------------------------<  296<H>  4.4   |  27  |  1.10    Ca    9.0      14 Aug 2019 21:17    TPro  7.0  /  Alb  3.0<L>  /  TBili  0.2  /  DBili  x   /  AST  17  /  ALT  16  /  AlkPhos  137<H>  08-14      PT/INR - ( 14 Aug 2019 21:17 )   PT: 11.9 sec;   INR: 1.05 ratio         PTT - ( 14 Aug 2019 21:17 )  PTT:32.2 sec        CXR:  Obtained, report pending. Upon my read, Clear bilaterally    CTH:  EXAM: CT ANGIO BRAIN (W)AW IC     EXAM: CT ANGIO NECK (W)AW IC     EXAM: CT BRAIN STROKE PROTOCOL       PROCEDURE DATE: 08/14/2019         INTERPRETATION: Study: CTA head and Neck     CLINICAL HISTORY: Right-sided weakness and numbness. Slurred speech. No   history of CVA. Code stroke.     TECHNIQUE: Noncontrast CT of the brain was obtained. CTA examination of the   head and neck performed by obtaining helical axial images on a   multi-detector CT unit during dynamic intravenous administration of   iodinated contrast in the arterial phase. The image data was acquired   utilizing submillimeter partitions and subsequently reconstructed into   3-dimensional maximum intensity projections and/or 3-D surface renditions in   multiple planes using a separate work station. Both the axial source and   reconstructed images were reviewed. 100 mls of Omnipaque 350 was   administered intravenously without complication and 0 mls were discarded.     COMPARISON: CT head from 8/15/2016     FINDINGS:     Noncontrast head CT:   There is no acute intracranial hemorrhage, mass effect or evidence of acute   large vascular territory infarct. Stable vague asymmetric region of   low-attenuation in the inferior left lentiform nucleus felt to represent a   dilated perivascular space (series 2 image 12).     The ventricles, sulci and cisternal spaces are stable in size and   configuration and unremarkable for age. There is no midline shift or   abnormal extra-axial fluid collection.     The calvarium is intact. There is no suspicious osteoblastic or lytic   calvarial lesion. Visualized paranasal sinuses and mastoid air cells are   clear.     CTA neck:   There is a normal configuration to the great vessels as they arise from the   aortic arch. The arch and origins of the great vessels are patent.     Both common carotid arteries take a normal course and caliber throughout the   cervical region. There is bifurcation into the internal and neck similar   prior arteries at about the same level. The visualized portions of the   external carotid arteries and the cervical internal carotid arteries are   widely patent and normal in caliber. No evidence of focal occlusion,   hemodynamically significant stenosis or dissection of the anterior carotid   circulation within the neck.     Origins of both vertebral arteries are identified and widely patent. The   vertebral arteries are codominant. The take a normal course and caliber   throughout the cervical region. No focal occlusion, hemodynamically   significant stenosis or dissection is seen in the posterior vertebral   circulation within the neck.     There is no evidence of vascular malformation in the neck. There is no   abnormal AV shunting.     Thyroid gland is unremarkable. There is no significant adenopathy in the   neck. The visualized upper lobes demonstrate mild to moderate centrilobular   emphysema.     There are no acute osseous abnormalities.       CTA brain:   The cervical, petrous, lacerum, cavernous, clinoid and supraclinoid segments   of both internal carotid arteries are widely patent and of normal course and   caliber throughout the skull base. Scattered calcifications involve both   cavernous segments without significant luminal stenosis. 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 The middle and anterior cerebral arteries otherwise demonstrate   symmetric arborization and patency without focal occlusion or   hemodynamically significant stenosis. At the right MCA bifurcation there is   a lobulated laterally and inferior projecting aneurysm measuring   approximately 3.7 x 4.1 x 5.4 mm. Anterior communicating artery is   unremarkable.     The intradural vertebral arteries remain codominant. The left posterior   inferior cerebellar arteries is identified and unremarkable. The right is   not well-visualized due to its diminutive caliber. The basilar artery and   its tributaries, including the posterior cerebral arteries are widely patent   and normal in caliber. Both posterior communicating arteries are identified   and unremarkable. No focal occlusion, hemodynamically significant stenosis   or aneurysm is seen in the posterior vertebral basilar circulation.     The major dural venous sinuses and peripheral venous tributaries enhance   appropriately for phase of contrast administration. There is no abnormal   intracranial enhancement. There is no evidence of a vascular malformation.       IMPRESSION:   Noncontrast head CT:   Stable exam. No acute intracranial hemorrhage or mass effect vasogenic edema.     CTA neck:   No major vessel occlusion, hemodynamically significant stenosis according to   the NASCET criteria or dissection.     CTA head:   No major vessel occlusion or hemodynamically significant stenosis. 3.7 x 4.1   x 5.4 mm lobulated right MCA bifurcation aneurysm.       I discussed the findings with Dr. Motley at 9:45 PM on 8/14/2019 with   read back verification.         CODE STATUS: Full  [facility] discussion: MARJORIE

## 2019-08-14 NOTE — ED ADULT TRIAGE NOTE - CHIEF COMPLAINT QUOTE
blurred vision, right sided numbness/tingling and slurred speech since 7pm tonight, patient results speech has resolved but numbness is persistent. no facial droop noted

## 2019-08-14 NOTE — H&P ADULT - NSHPPHYSICALEXAM_GEN_ALL_CORE
Physical Exam  General: No apparent distress  Head: normocephalic, atraumatic  Eyes: EOMI, anicteric, PEERLA  ENT: moist mucous membranes, no pharyngeal exudates  Heart: RRR, S1, S2, no murmurs  Chest: CTA b/l, no rales, rhonchi, or wheezes  Abd: BS+, soft, NT, ND  Extr: no edema or cyanosis  Neuro: AA&Ox3, CN 2-12 intact, 3/5 strength RLE, 5/5 strength LLE, 4/5 strength RUE, 5/5 strength LUE, 3/5  strength RUE, 5/5  strength LUE, decreased sensation of RUE and RLE (feels dull), decreased sensation of R face in CN V2-V3  Psych: normal affect

## 2019-08-14 NOTE — ED ADULT NURSE NOTE - OBJECTIVE STATEMENT
Pt reports that he had right arm weakness, and right leg weakness today, states that he is on blood thinner but did not take them recently. Pt's friend said that he also had slurred speech.

## 2019-08-14 NOTE — H&P ADULT - NSICDXPASTMEDICALHX_GEN_ALL_CORE_FT
PAST MEDICAL HISTORY:  Afib     BPH (benign prostatic hyperplasia)     CAD S/P percutaneous coronary angioplasty     Diabetes     Diabetes mellitus with no complication     History of non-ST elevation myocardial infarction (NSTEMI)     Hypertension     Osteomyelitis s/p debridement    Perforated gastric ulcer s/p emergent ex-lap omentopexy and plication 6/2019    Pulmonary embolism

## 2019-08-14 NOTE — H&P ADULT - ASSESSMENT
49M with PMH CAD s/p BMS to RCA (8/2019), RUL subsegmental PE (6/2019, on Eliquis), hx of NSTEMI and PAF s/p ex-lap omentopexy and plication for perforated antral ulcer (5/2019), osteomyelitis s/p debridement,  HTN and DM2 (not on insulin) presenting to the ED after having slurred speech and R sided weakness admitted for CVA, s/p TPA in the ED now in ICU for post TPA management.          Noncontrast head CT: Stable exam. No acute intracranial hemorrhage or mass effect vasogenic edema.  CTA neck: No major vessel occlusion, hemodynamically significant stenosis according   to the NASCET criteria or dissection.  CTA head: No major vessel occlusion or hemodynamically significant stenosis. 3.7 x   4.1 x 5.4 mm lobulated right MCA bifurcation aneurysm. 49M with PMH CAD s/p BMS to RCA (8/2019), RUL subsegmental PE (6/2019, on Eliquis), hx of NSTEMI and PAF s/p ex-lap omentopexy and plication for perforated antral ulcer (5/2019), osteomyelitis s/p debridement,  HTN and DM2 (not on insulin) presenting to the ED after having slurred speech and R sided weakness admitted for CVA, s/p TPA in the ED now in ICU for post TPA management.

## 2019-08-14 NOTE — H&P ADULT - PROBLEM SELECTOR PLAN 4
-NSTEMI s/p ex-lap omentopexy and plication in 5/2019 of perforated ulcer  -stable angina at times, on Imdur 30mg at home  -atorvastatin 80mg at home  -management as per ICU

## 2019-08-14 NOTE — H&P ADULT - PROBLEM SELECTOR PLAN 10
-s/p TPA in ED, on Eliquis 5mg BID at home, will continue at discretion of ICU    IMPROVE VTE Individual Risk Assessment  RISK                                                                Points  [X  ] Previous VTE                                                  3  [ X ] Thrombophilia                                               2  [  ] Lower limb paralysis                                      2        (unable to hold up >15 seconds)    [  ] Current Cancer                                              2         (within 6 months)  [  ] Immobilization > 24 hrs                                1  [  ] ICU/CCU stay > 24 hours                              1  [  ] Age > 60                                                      1  IMPROVE VTE Score ____5_____  IMPROVE Score 0-1: Low Risk, No VTE prophylaxis required for most patients, encourage ambulation.   IMPROVE Score 2-3: At risk, pharmacologic VTE prophylaxis is indicated for most patients (in the absence of a contraindication)  IMPROVE Score > or = 4: High Risk, pharmacologic VTE prophylaxis is indicated for most patients (in the absence of a contraindication)

## 2019-08-14 NOTE — H&P ADULT - NSHPSOCIALHISTORY_GEN_ALL_CORE
Lives at home with friends, ambulates with walker since 5/2019  patient denies illicit drug use, social drinker, quite cigarette use on May 10th 2019, smoked 1.5-2 pack cigarettes for 30 years prior.

## 2019-08-14 NOTE — ED ADULT NURSE REASSESSMENT NOTE - NS ED NURSE REASSESS COMMENT FT1
Pt alert and oriented, arrived in ER with stroke like symptoms, pt brought to CT scan and TPA ordered per MD, pt arrived in ER with indwelling jacques catheter in place and unable to change to due emergent need to administer TPA first, Dr. Motley aware. Pt states he has the jacques for urinary retention. Pt has scattered scabs on both feet, and left great toe amp, he reports chronic knee pain, but is comfortable at this time. Pt to be transferred to ICU, and agreeable with plan.

## 2019-08-14 NOTE — ED PROVIDER NOTE - CRITICAL CARE PROVIDED
direct patient care (not related to procedure)/additional history taking/interpretation of diagnostic studies/consult w/ pt's family directly relating to pts condition/consultation with other physicians/documentation

## 2019-08-14 NOTE — H&P ADULT - PROBLEM SELECTOR PLAN 6
-HbA1C 8/1/19 was 8.3  -patient is on glargine 24U qhs and lispro 3U prior to meals, and metformin 1000mg BID  -management as per ICU

## 2019-08-14 NOTE — H&P ADULT - NSHPREVIEWOFSYSTEMS_GEN_ALL_CORE
Review of Systems  General: no fever, chills  Eyes: admits to blurry vision comes and goes  ENT: no hearing changes, nasal congestions, or sore throat, admits to slurred speech (now resolved)  CV: no chest pain or palpitations  Pulm: admits to SOB (now resolved), no wheezing, cough, or hemoptysis  Abd/GI: no nausea, vomiting, diarrhea, constipation, abd pain  : no dysuria, hematuria, urinary frequency  MSK: no joint pain or myalgias, admits to R arm and leg weakness  Neuro: no syncope, dizziness, focal weakness, admits to R arm and leg numbness and tingling  Psych: no anxiety or depression

## 2019-08-14 NOTE — CONSULT NOTE ADULT - ASSESSMENT
49M with PMHx as above, presents to ED with acute slurring of speech and right sided weakness. Pt is now s/p tPA for suspected acute CVA    -Q1 neuro checks  -Pt received tPA around 2200 tonight.   -Neuro consulted by ED Physician  -Obtain TTE in am  -Check lipid profile, Thyroid panel  -Start statin.   -No jacques/IV catheter placement, lab draws as per tPA protocol  -Repeat CTH in 24-48 hours  -Keep -160  -Given recent gastric procedure for perforated antral ulcer and CTH finding of MCA bifurcating aneurysm, will keep close eye on Hgb over night  -PT/OT consult  -Speech and swallow consult  -Passed bedside swallow evaluation in ED. Will start clears tonight and monitor  -Case D/W EICU Attending

## 2019-08-14 NOTE — H&P ADULT - HISTORY OF PRESENT ILLNESS
49M with PMH of PAF on Eliquis, CAD s/p BMS (8/2019), RUL subsegmental PE (6/2019), hx of NSTEMI s/p ex-lap omentopexy and plication for perforated antral ulcer (5/2019), osteomyelitis s/p debridement,  HTN and DM2 (not on insulin) presenting to the ED     Of note patient recently admitted to [place] and transferred to [place] on 7/31/2019 for chest pain, had cardiac cath done showed mild LAD disease, 90% RCA and 100% RPL, had BMS to RCA placed.    In the ED, T 99.6F, HR 80, /74, RR 15, SpO2 97% on RA.  Labs significant for H/H 9.4/31.1 (baseline around 9.5-10.5), PT/INR 11.9/1.05, glucose 296.  Received TPA in the ED.    Noncontrast head CT: Stable exam. No acute intracranial hemorrhage or mass effect vasogenic edema.  CTA neck: No major vessel occlusion, hemodynamically significant stenosis according   to the NASCET criteria or dissection.  CTA head: No major vessel occlusion or hemodynamically significant stenosis. 3.7 x   4.1 x 5.4 mm lobulated right MCA bifurcation aneurysm. 49M with PMH CAD s/p BMS to RCA (8/2019), RUL subsegmental PE (6/2019, on Eliquis), hx of NSTEMI and PAF s/p ex-lap omentopexy and plication for perforated antral ulcer (5/2019), osteomyelitis s/p debridement,  HTN and DM2 (not on insulin) presenting to the ED after having slurred speech and R sided weakness.   Patient states around 6pm-6:30pm he was sitting at table with friends when started having slurred speech and R sided arm and LE weakness, numbness and tingling.  Also felt confused and disoriented at times, associated with some blurry vision.  Patient waited about 2 hours before presenting to ED for persistent symptoms.  In the ED, received TPA for CVA.  Patient states currently still feels numbness/ tingling (pins and needles) in RUE and RLE however blurry vision comes and goes s/p TPA in the ER. Denies chest pain, palpitations, headache, n/v, abd pain, urinary or bowel incontinence.  Denies lightheadedness/ dizziness, no fall or LOC.  Patient does admit to some SOB when symptoms started, now resolved.  Admits to significant weakness on R side not baseline.  Of note patient recently admitted to [place] and [facility] for chest pain, had cardiac cath done showed mild LAD disease, 90% RCA and 100% RPL, had BMS to RCA placed.  His baby asa was stopped but was started on plavix and Eliquis on discharge 8/9/19, since discharge patient has not had Eliquis because not covered by insurance (only was taking plavix).  Patient states he has family history of stroke on both sides of family, mother had some genetic coagulopathy (Factor V Leiden sounded familiar but not sure), however he was tested for genetic coagulopathies in 5/2011 and was negative.    In the ED, T 99.6F, HR 80, /74, RR 15, SpO2 97% on RA.  Labs significant for H/H 9.4/31.1 (baseline around 9.5-10.5), PT/INR 11.9/1.05, glucose 296.  Received TPA in the ED.    EKG: NSR  Noncontrast head CT: Stable exam. No acute intracranial hemorrhage or mass effect vasogenic edema.  CTA neck: No major vessel occlusion, hemodynamically significant stenosis according   to the NASCET criteria or dissection.  CTA head: No major vessel occlusion or hemodynamically significant stenosis. 3.7 x   4.1 x 5.4 mm lobulated right MCA bifurcation aneurysm.

## 2019-08-14 NOTE — H&P ADULT - PROBLEM SELECTOR PLAN 7
-h/x of perforated ulcer, s/p ex-lap omentopexy and plication in 5/2019  -monitor H/H closely   -patient on protonix 40mg and sucralfate 1g QID at home  -management as per ICU

## 2019-08-14 NOTE — ED PROVIDER NOTE - CARE PLAN
Principal Discharge DX:	Right sided weakness Principal Discharge DX:	CVA (cerebral vascular accident)  Secondary Diagnosis:	Right sided weakness

## 2019-08-14 NOTE — H&P ADULT - PROBLEM SELECTOR PLAN 2
-patient had bare metal stent placed on 7/31/19  -was started on plavix and Eliquis 5mg BID at that time however did not continue with Eliquis on discharge due to insurance issues  -will hold for now as patient received TPA  -management as per ICU

## 2019-08-15 DIAGNOSIS — K25.5 CHRONIC OR UNSPECIFIED GASTRIC ULCER WITH PERFORATION: ICD-10-CM

## 2019-08-15 DIAGNOSIS — I25.2 OLD MYOCARDIAL INFARCTION: ICD-10-CM

## 2019-08-15 DIAGNOSIS — I48.91 UNSPECIFIED ATRIAL FIBRILLATION: ICD-10-CM

## 2019-08-15 DIAGNOSIS — I26.99 OTHER PULMONARY EMBOLISM WITHOUT ACUTE COR PULMONALE: ICD-10-CM

## 2019-08-15 DIAGNOSIS — R93.0 ABNORMAL FINDINGS ON DIAGNOSTIC IMAGING OF SKULL AND HEAD, NOT ELSEWHERE CLASSIFIED: ICD-10-CM

## 2019-08-15 DIAGNOSIS — I25.10 ATHEROSCLEROTIC HEART DISEASE OF NATIVE CORONARY ARTERY WITHOUT ANGINA PECTORIS: ICD-10-CM

## 2019-08-15 DIAGNOSIS — N40.0 BENIGN PROSTATIC HYPERPLASIA WITHOUT LOWER URINARY TRACT SYMPTOMS: ICD-10-CM

## 2019-08-15 DIAGNOSIS — I63.9 CEREBRAL INFARCTION, UNSPECIFIED: ICD-10-CM

## 2019-08-15 DIAGNOSIS — Z29.9 ENCOUNTER FOR PROPHYLACTIC MEASURES, UNSPECIFIED: ICD-10-CM

## 2019-08-15 DIAGNOSIS — K59.00 CONSTIPATION, UNSPECIFIED: ICD-10-CM

## 2019-08-15 DIAGNOSIS — E11.9 TYPE 2 DIABETES MELLITUS WITHOUT COMPLICATIONS: ICD-10-CM

## 2019-08-15 LAB
ANION GAP SERPL CALC-SCNC: 6 MMOL/L — SIGNIFICANT CHANGE UP (ref 5–17)
APPEARANCE UR: CLEAR — SIGNIFICANT CHANGE UP
BASOPHILS # BLD AUTO: 0.05 K/UL — SIGNIFICANT CHANGE UP (ref 0–0.2)
BASOPHILS NFR BLD AUTO: 0.5 % — SIGNIFICANT CHANGE UP (ref 0–2)
BILIRUB UR-MCNC: NEGATIVE — SIGNIFICANT CHANGE UP
BUN SERPL-MCNC: 11 MG/DL — SIGNIFICANT CHANGE UP (ref 7–23)
CALCIUM SERPL-MCNC: 8.4 MG/DL — LOW (ref 8.5–10.1)
CHLORIDE SERPL-SCNC: 109 MMOL/L — HIGH (ref 96–108)
CHOLEST SERPL-MCNC: 86 MG/DL — SIGNIFICANT CHANGE UP (ref 10–199)
CO2 SERPL-SCNC: 28 MMOL/L — SIGNIFICANT CHANGE UP (ref 22–31)
COLOR SPEC: YELLOW — SIGNIFICANT CHANGE UP
CREAT SERPL-MCNC: 0.82 MG/DL — SIGNIFICANT CHANGE UP (ref 0.5–1.3)
DIFF PNL FLD: ABNORMAL
EOSINOPHIL # BLD AUTO: 0.67 K/UL — HIGH (ref 0–0.5)
EOSINOPHIL NFR BLD AUTO: 6.8 % — HIGH (ref 0–6)
GLUCOSE SERPL-MCNC: 116 MG/DL — HIGH (ref 70–99)
GLUCOSE UR QL: NEGATIVE — SIGNIFICANT CHANGE UP
HCT VFR BLD CALC: 27.3 % — LOW (ref 39–50)
HDLC SERPL-MCNC: 43 MG/DL — SIGNIFICANT CHANGE UP
HGB BLD-MCNC: 8.5 G/DL — LOW (ref 13–17)
IMM GRANULOCYTES NFR BLD AUTO: 0.4 % — SIGNIFICANT CHANGE UP (ref 0–1.5)
KETONES UR-MCNC: NEGATIVE — SIGNIFICANT CHANGE UP
LEUKOCYTE ESTERASE UR-ACNC: NEGATIVE — SIGNIFICANT CHANGE UP
LIPID PNL WITH DIRECT LDL SERPL: 25 MG/DL — SIGNIFICANT CHANGE UP
LYMPHOCYTES # BLD AUTO: 2.25 K/UL — SIGNIFICANT CHANGE UP (ref 1–3.3)
LYMPHOCYTES # BLD AUTO: 23 % — SIGNIFICANT CHANGE UP (ref 13–44)
MAGNESIUM SERPL-MCNC: 2 MG/DL — SIGNIFICANT CHANGE UP (ref 1.6–2.6)
MCHC RBC-ENTMCNC: 27 PG — SIGNIFICANT CHANGE UP (ref 27–34)
MCHC RBC-ENTMCNC: 31.1 GM/DL — LOW (ref 32–36)
MCV RBC AUTO: 86.7 FL — SIGNIFICANT CHANGE UP (ref 80–100)
MONOCYTES # BLD AUTO: 0.84 K/UL — SIGNIFICANT CHANGE UP (ref 0–0.9)
MONOCYTES NFR BLD AUTO: 8.6 % — SIGNIFICANT CHANGE UP (ref 2–14)
NEUTROPHILS # BLD AUTO: 5.94 K/UL — SIGNIFICANT CHANGE UP (ref 1.8–7.4)
NEUTROPHILS NFR BLD AUTO: 60.7 % — SIGNIFICANT CHANGE UP (ref 43–77)
NITRITE UR-MCNC: NEGATIVE — SIGNIFICANT CHANGE UP
NRBC # BLD: 0 /100 WBCS — SIGNIFICANT CHANGE UP (ref 0–0)
PH UR: 9 — HIGH (ref 5–8)
PHOSPHATE SERPL-MCNC: 3.8 MG/DL — SIGNIFICANT CHANGE UP (ref 2.5–4.5)
PLATELET # BLD AUTO: 378 K/UL — SIGNIFICANT CHANGE UP (ref 150–400)
POTASSIUM SERPL-MCNC: 4 MMOL/L — SIGNIFICANT CHANGE UP (ref 3.5–5.3)
POTASSIUM SERPL-SCNC: 4 MMOL/L — SIGNIFICANT CHANGE UP (ref 3.5–5.3)
PROT UR-MCNC: 25 MG/DL
RBC # BLD: 3.15 M/UL — LOW (ref 4.2–5.8)
RBC # FLD: 13.6 % — SIGNIFICANT CHANGE UP (ref 10.3–14.5)
SODIUM SERPL-SCNC: 143 MMOL/L — SIGNIFICANT CHANGE UP (ref 135–145)
SP GR SPEC: 1.01 — SIGNIFICANT CHANGE UP (ref 1.01–1.02)
TOTAL CHOLESTEROL/HDL RATIO MEASUREMENT: 2 RATIO — LOW (ref 3.4–9.6)
TRIGL SERPL-MCNC: 89 MG/DL — SIGNIFICANT CHANGE UP (ref 10–149)
TSH SERPL-MCNC: 0.55 UIU/ML — SIGNIFICANT CHANGE UP (ref 0.36–3.74)
UROBILINOGEN FLD QL: NEGATIVE — SIGNIFICANT CHANGE UP
WBC # BLD: 9.79 K/UL — SIGNIFICANT CHANGE UP (ref 3.8–10.5)
WBC # FLD AUTO: 9.79 K/UL — SIGNIFICANT CHANGE UP (ref 3.8–10.5)

## 2019-08-15 PROCEDURE — 70551 MRI BRAIN STEM W/O DYE: CPT | Mod: 26

## 2019-08-15 PROCEDURE — 99223 1ST HOSP IP/OBS HIGH 75: CPT

## 2019-08-15 PROCEDURE — 93306 TTE W/DOPPLER COMPLETE: CPT | Mod: 26

## 2019-08-15 PROCEDURE — 99291 CRITICAL CARE FIRST HOUR: CPT

## 2019-08-15 RX ORDER — DEXTROSE 50 % IN WATER 50 %
12.5 SYRINGE (ML) INTRAVENOUS ONCE
Refills: 0 | Status: DISCONTINUED | OUTPATIENT
Start: 2019-08-15 | End: 2019-08-18

## 2019-08-15 RX ORDER — SODIUM CHLORIDE 9 MG/ML
1000 INJECTION, SOLUTION INTRAVENOUS
Refills: 0 | Status: DISCONTINUED | OUTPATIENT
Start: 2019-08-15 | End: 2019-08-18

## 2019-08-15 RX ORDER — INSULIN LISPRO 100/ML
VIAL (ML) SUBCUTANEOUS EVERY 6 HOURS
Refills: 0 | Status: DISCONTINUED | OUTPATIENT
Start: 2019-08-15 | End: 2019-08-15

## 2019-08-15 RX ORDER — ONDANSETRON 8 MG/1
4 TABLET, FILM COATED ORAL EVERY 6 HOURS
Refills: 0 | Status: DISCONTINUED | OUTPATIENT
Start: 2019-08-15 | End: 2019-08-21

## 2019-08-15 RX ORDER — INSULIN LISPRO 100/ML
VIAL (ML) SUBCUTANEOUS AT BEDTIME
Refills: 0 | Status: DISCONTINUED | OUTPATIENT
Start: 2019-08-15 | End: 2019-08-15

## 2019-08-15 RX ORDER — GLUCAGON INJECTION, SOLUTION 0.5 MG/.1ML
1 INJECTION, SOLUTION SUBCUTANEOUS ONCE
Refills: 0 | Status: DISCONTINUED | OUTPATIENT
Start: 2019-08-15 | End: 2019-08-18

## 2019-08-15 RX ORDER — APIXABAN 2.5 MG/1
5 TABLET, FILM COATED ORAL
Refills: 0 | Status: DISCONTINUED | OUTPATIENT
Start: 2019-08-15 | End: 2019-08-21

## 2019-08-15 RX ORDER — DEXTROSE 50 % IN WATER 50 %
25 SYRINGE (ML) INTRAVENOUS ONCE
Refills: 0 | Status: DISCONTINUED | OUTPATIENT
Start: 2019-08-15 | End: 2019-08-18

## 2019-08-15 RX ORDER — CLOPIDOGREL BISULFATE 75 MG/1
75 TABLET, FILM COATED ORAL DAILY
Refills: 0 | Status: DISCONTINUED | OUTPATIENT
Start: 2019-08-15 | End: 2019-08-21

## 2019-08-15 RX ORDER — PANTOPRAZOLE SODIUM 20 MG/1
40 TABLET, DELAYED RELEASE ORAL
Refills: 0 | Status: DISCONTINUED | OUTPATIENT
Start: 2019-08-15 | End: 2019-08-21

## 2019-08-15 RX ORDER — INSULIN LISPRO 100/ML
VIAL (ML) SUBCUTANEOUS
Refills: 0 | Status: DISCONTINUED | OUTPATIENT
Start: 2019-08-15 | End: 2019-08-16

## 2019-08-15 RX ORDER — ACETAMINOPHEN 500 MG
1000 TABLET ORAL ONCE
Refills: 0 | Status: COMPLETED | OUTPATIENT
Start: 2019-08-15 | End: 2019-08-15

## 2019-08-15 RX ORDER — DEXTROSE 50 % IN WATER 50 %
15 SYRINGE (ML) INTRAVENOUS ONCE
Refills: 0 | Status: DISCONTINUED | OUTPATIENT
Start: 2019-08-15 | End: 2019-08-18

## 2019-08-15 RX ORDER — ATORVASTATIN CALCIUM 80 MG/1
80 TABLET, FILM COATED ORAL AT BEDTIME
Refills: 0 | Status: DISCONTINUED | OUTPATIENT
Start: 2019-08-15 | End: 2019-08-21

## 2019-08-15 RX ORDER — TAMSULOSIN HYDROCHLORIDE 0.4 MG/1
0.4 CAPSULE ORAL AT BEDTIME
Refills: 0 | Status: DISCONTINUED | OUTPATIENT
Start: 2019-08-15 | End: 2019-08-21

## 2019-08-15 RX ORDER — INSULIN LISPRO 100/ML
VIAL (ML) SUBCUTANEOUS AT BEDTIME
Refills: 0 | Status: DISCONTINUED | OUTPATIENT
Start: 2019-08-15 | End: 2019-08-16

## 2019-08-15 RX ORDER — SODIUM CHLORIDE 9 MG/ML
1000 INJECTION, SOLUTION INTRAVENOUS ONCE
Refills: 0 | Status: COMPLETED | OUTPATIENT
Start: 2019-08-15 | End: 2019-08-15

## 2019-08-15 RX ORDER — SUCRALFATE 1 G
1 TABLET ORAL
Refills: 0 | Status: DISCONTINUED | OUTPATIENT
Start: 2019-08-15 | End: 2019-08-21

## 2019-08-15 RX ADMIN — ONDANSETRON 4 MILLIGRAM(S): 8 TABLET, FILM COATED ORAL at 01:30

## 2019-08-15 RX ADMIN — Medication 1 GRAM(S): at 12:02

## 2019-08-15 RX ADMIN — Medication 1: at 12:03

## 2019-08-15 RX ADMIN — ATORVASTATIN CALCIUM 80 MILLIGRAM(S): 80 TABLET, FILM COATED ORAL at 22:16

## 2019-08-15 RX ADMIN — Medication 1 GRAM(S): at 18:09

## 2019-08-15 RX ADMIN — APIXABAN 5 MILLIGRAM(S): 2.5 TABLET, FILM COATED ORAL at 22:16

## 2019-08-15 RX ADMIN — Medication 400 MILLIGRAM(S): at 02:28

## 2019-08-15 RX ADMIN — CLOPIDOGREL BISULFATE 75 MILLIGRAM(S): 75 TABLET, FILM COATED ORAL at 22:17

## 2019-08-15 RX ADMIN — TAMSULOSIN HYDROCHLORIDE 0.4 MILLIGRAM(S): 0.4 CAPSULE ORAL at 22:17

## 2019-08-15 RX ADMIN — Medication 1: at 18:09

## 2019-08-15 RX ADMIN — Medication 2 MILLIGRAM(S): at 16:14

## 2019-08-15 RX ADMIN — PANTOPRAZOLE SODIUM 40 MILLIGRAM(S): 20 TABLET, DELAYED RELEASE ORAL at 12:02

## 2019-08-15 RX ADMIN — SODIUM CHLORIDE 500 MILLILITER(S): 9 INJECTION, SOLUTION INTRAVENOUS at 02:30

## 2019-08-15 NOTE — DIETITIAN INITIAL EVALUATION ADULT. - OTHER INFO
Pt sleeping soundly at time of visit. Dx CVA s/p TPA. NPO. No report N/V. Last BM 8/11 per EMR. Recommend stool softeners/laxatives prn. Hx uncontrolled DM, HgbA1c 8.3%. Pta on Lantus 24units qhs, humalog 3 units TID, metformin. Lipid profile wdl 8/15 (Chol 86, TG 89, HDL 43, LDL 25).  Recommend SLP evaluation to assess swallowing status. Will remain available for DM/CVA nutrition therapy when medically appropriate.

## 2019-08-15 NOTE — SWALLOW BEDSIDE ASSESSMENT ADULT - ASR SWALLOW ASPIRATION MONITOR
position upright (90Y)/cough/oral hygiene/fever/upper respiratory infection/gurgly voice/pneumonia/change of breathing pattern/throat clearing

## 2019-08-15 NOTE — PATIENT PROFILE ADULT - FALL HARM RISK
bones(Osteoporosis,prev fx,steroid use,metastatic bone ca)/walks w/ the rolling walker at home/other

## 2019-08-15 NOTE — SWALLOW BEDSIDE ASSESSMENT ADULT - SWALLOW EVAL: RECOMMENDED FEEDING/EATING TECHNIQUES
allow for swallow between intakes/position upright (90 degrees)/small sips/bites/maintain upright posture during/after eating for 30 mins/oral hygiene

## 2019-08-15 NOTE — PROVIDER CONTACT NOTE (EICU) - SITUATION
49M PMHX: A fib on AC, CAD w/stent, HTN, DM p/w Right side  weakness/numbness and slurred speech CT head neg for heme however  incidental finding of Right MCA bifurcation aneurysm. TPA was given by  ER Attending after  discusing case with neurology 49M PMHX: A fib on AC, CAD w/stent, HTN, DM p/w right side weakness/numbness and slurred speech CT head neg for heme however incidental finding of Right MCA bifurcation aneurysm.   TPA was given by  ER Attending after  discussing case with neurology at [facility]

## 2019-08-15 NOTE — PROGRESS NOTE ADULT - ATTENDING COMMENTS
Patient seen and examined, agree with above     50 y/o male with hx CAD, recent PCI (8/2/19), A.fib on apixaban (didn't take it for the past 10 days due to insurance issues), HTN, DM2 (on insulin), perforated antral ulcer s/p omentopexy and plication, left big toe OM s/p amputation, presented with slurred speech (resolved PTA) and R sided weakness, received tPA for acute CVA (likely L MCA).     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 - neurosurgery eval as outpatient (d/w neurology), start apixaban and Plavix at 10pm tonight (24hrs after tPA), continue statin, PT and swallow eval  CV - SBP 130s, hold Imdur to prevent hypotension, hold amiodarone as HR 40-50s, start apixaban, Plavix from tonight, continue statin, check ECHO  Resp - stable   GI - start po diet after swallow eval, aspiration precautions   Renal - stable   ID - no active issues   MSK - PT eval, fall precautions   PPx - apixaban     Patient critically ill, 40mins spent Patient seen and examined, agree with above     50 y/o male with hx CAD, recent PCI (8/2/19), A.fib on apixaban (didn't take it for the past 10 days due to insurance issues), HTN, DM2 (on insulin), perforated antral ulcer s/p omentopexy and plication, left big toe OM s/p amputation, presented with slurred speech (resolved PTA) and R sided weakness, received tPA for acute CVA (likely L MCA).     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

## 2019-08-15 NOTE — PROGRESS NOTE ADULT - SUBJECTIVE AND OBJECTIVE BOX
Patient is a 49y old  Male who presents with a chief complaint of CVA (15 Aug 2019 10:38)      INTERVAL HPI/OVERNIGHT EVENTS:  Patient seen and examined at bedside. He states that he feels the same as he did when he came in.  He still feels right sided weakness and numbness, improvement in his speech.     T(C): 36.9 (08-15-19 @ 11:52), Max: 37.6 (08-14-19 @ 20:55)  HR: 62 (08-15-19 @ 13:00) (49 - 90)  BP: 144/74 (08-15-19 @ 13:00) (95/53 - 154/70)  RR: 17 (08-15-19 @ 13:00) (14 - 28)  SpO2: 96% (08-15-19 @ 13:00) (92% - 100%)  Wt(kg): --    I&O's Summary    14 Aug 2019 07:01  -  15 Aug 2019 07:00  --------------------------------------------------------  IN: 1100 mL / OUT: 1700 mL / NET: -600 mL    15 Aug 2019 07:01  -  15 Aug 2019 14:21  --------------------------------------------------------  IN: 0 mL / OUT: 550 mL / NET: -550 mL        LABS:                        8.5    9.79  )-----------( 378      ( 15 Aug 2019 06:55 )             27.3     08-15    143  |  109<H>  |  11  ----------------------------<  116<H>  4.0   |  28  |  0.82    Ca    8.4<L>      15 Aug 2019 06:55  Phos  3.8     08-15  Mg     2.0     08-15    TPro  7.0  /  Alb  3.0<L>  /  TBili  0.2  /  DBili  x   /  AST  17  /  ALT  16  /  AlkPhos  137<H>  08-14    PT/INR - ( 14 Aug 2019 21:17 )   PT: 11.9 sec;   INR: 1.05 ratio         PTT - ( 14 Aug 2019 21:17 )  PTT:32.2 sec  CAPILLARY BLOOD GLUCOSE      POCT Blood Glucose.: 156 mg/dL (15 Aug 2019 11:58)  POCT Blood Glucose.: 304 mg/dL (14 Aug 2019 21:07)             MEDICATIONS  (STANDING):  apixaban 5 milliGRAM(s) Oral two times a day  atorvastatin 80 milliGRAM(s) Oral at bedtime  clopidogrel Tablet 75 milliGRAM(s) Oral daily  dextrose 5%. 1000 milliLiter(s) (50 mL/Hr) IV Continuous <Continuous>  dextrose 5%. 1000 milliLiter(s) (50 mL/Hr) IV Continuous <Continuous>  dextrose 50% Injectable 12.5 Gram(s) IV Push once  dextrose 50% Injectable 25 Gram(s) IV Push once  dextrose 50% Injectable 25 Gram(s) IV Push once  dextrose 50% Injectable 12.5 Gram(s) IV Push once  dextrose 50% Injectable 25 Gram(s) IV Push once  dextrose 50% Injectable 25 Gram(s) IV Push once  insulin lispro (HumaLOG) corrective regimen sliding scale   SubCutaneous three times a day with meals  insulin lispro (HumaLOG) corrective regimen sliding scale   SubCutaneous at bedtime  pantoprazole    Tablet 40 milliGRAM(s) Oral before breakfast  sucralfate 1 Gram(s) Oral four times a day  tamsulosin 0.4 milliGRAM(s) Oral at bedtime    MEDICATIONS  (PRN):  dextrose 40% Gel 15 Gram(s) Oral once PRN Blood Glucose LESS THAN 70 milliGRAM(s)/deciliter  glucagon  Injectable 1 milliGRAM(s) IntraMuscular once PRN Glucose LESS THAN 70 milligrams/deciliter  glucagon  Injectable 1 milliGRAM(s) IntraMuscular once PRN Glucose LESS THAN 70 milligrams/deciliter  ondansetron Injectable 4 milliGRAM(s) IV Push every 6 hours PRN Nausea and/or Vomiting      REVIEW OF SYSTEMS:  CONSTITUTIONAL: No fever, chills, or fatigue  RESPIRATORY: No cough, wheezing, No shortness of breath  CARDIOVASCULAR: No chest pain, palpitations, dizziness, or leg swelling  NEUROLOGICAL: No headaches, +numbness, +weakness right side      RADIOLOGY & ADDITIONAL TESTS:    Imaging Personally Reviewed:  [ x ] YES  [ ] NO    Consultant(s) Notes Reviewed:  [ x ] YES  [ ] NO    PHYSICAL EXAM:  GENERAL: NAD, resting comfortably, calm, cooperative  HEAD: Atraumatic, Normocephalic  EYES: EOMI,   ENMT: Moist mucous membranes  NERVOUS SYSTEM: Alert & Oriented X3, Good concentration; decreased motor strength RUE, RLE. decreased sensation RUE, RLE  CHEST/LUNG: Clear to auscultation bilaterally  HEART: Regular rate and rhythm; No murmurs, rubs, or gallops      Care Discussed with Consultants/Other Providers [ x ] YES  [ ] NO

## 2019-08-15 NOTE — PROGRESS NOTE ADULT - ASSESSMENT
49M with PMHx of CAD (s/p stent to RCA August 2019), NSTEMI, afib (not taking prescribed eliquis), recent RUL subsegmental PE (June 2019), T2DM complicated by diabetic neuropathy and OM s/p amputation of left great toe, HTN,  perforated antral ulcer s/p emergent ex-lap, omentopexy and plication (May 2019, Dr. Mejia), who presented to ED with acute onset slurred speech and right sided weakness. He is s/p TPA given 4 hours after the onset of symptoms (22:00 8/14) with interval improvement in symptoms.     Neuro: Q1 neuro checks. Repeat CT head. Monitor for headache, worsening neuro status. Speech and swallow, PT/OT. F/u with neuro about restarting plavix, AC  CV:  s/p BMS on 8/1/2019. He is now s/p 1 week course of triple therapy, and should now be on Plavix and full dose a/c. TTE, lipid panel.Keep -160.  Restart Amiodarone 100 daily with hold parameters.  Euvolemic on exam. Can hold po Lasix temporarily.   Restart home atorvastatin 80.  Keep K>4, Mg>2  Pulm:  GI: Bowel reg, protonix 40 BID, sucralfate  Renal/lytes:  ID:  Endo: Thyroid panel, insulin (home   Heme: AC when cleared by neuro 49M with PMHx of afib (no eliquis for last 10 days due to insurance lapse), CAD (s/p stent to RCA August 2019), NSTEMI, recent RUL subsegmental PE (June 2019), T2DM con insulin c/b diabetic neuropathy and OM s/p amputation of left great toe, HTN, and perforated antral ulcer s/p emergent ex-lap, omentopexy and plication (May 2019, Dr. Mejia), who presented to ED with acute onset slurred speech and right sided weakness. He is s/p TPA given 4 hours after the onset of symptoms (22:00 8/14) with interval improvement in speech. CTA revealed aneurysm at the bifurcation of R MCA.    Neuro: Q1 neuro checks. Repeat CT head. Monitor for headache, worsening neuro status. Speech and swallow, PT/OT. Hold AC agents until 22:00. Neurology following. MRI to further evaluate aneurysm seen on CTA head.   CV: Will restart plavix and apixaban at 22:00. Lipid panel wnl. TTE pending. Keep -160. Holding home amiodarone and furosemide due to bradycardia, normotension, euvolemia. Restart home atorvastatin 80.  Keep K>4, Mg>2  Pulm: No active issues  GI: DASH diet with consistent carbohydrates. Home bowel regimen, protonix 40 BID, sucralfate. zofran orn for nausea.  Renal/lytes: Chronic jacques for urinary retention 2/2 BPH. Continue home tamsulosin. Monitor electrolytes.  ID: No active issues  Endo: HbA1c 8.3 8/1. TSH normal. Insulin sliding scale with accuchecks and hypoglycemia protocol.  Heme: AC will restart at 22:00

## 2019-08-15 NOTE — PROGRESS NOTE ADULT - ASSESSMENT
49M with PMHx as above, presents to ED with acute slurring of speech and right sided weakness. Pt is now s/p tPA for suspected acute CVA    -Continue neuro checks. MRI Head noted.   -Will start plavix and eliquis tonight (2200)   -Neuro recommendations appreciated. Will need neurosurgery eval as outpatient for L MCA bifurcating aneurysm on CTA head.  -F/U TTE report  -Holding antihypertensives due to soft BP. Keep -180  -Continue statin  -Respiratory stable. Keep O2 saturation > 92%  -PT/OT   -PO diet as tolerated. PPI  -Pt with Chronic jacques. Monitor UOP/Lytes  -DVT ppx  -Supportive care

## 2019-08-15 NOTE — PATIENT PROFILE ADULT - NSPROHMDIABETMGMTSTRAT_GEN_A_NUR
taking lantus 30 units at bedtime , metformin 1000mg twice a day/blood glucose testing/insulin therapy

## 2019-08-15 NOTE — PROVIDER CONTACT NOTE (EICU) - RECOMMENDATIONS
neuro monitoring, neurology f/up  repeat CT 24 hr post TPA or sooner if new neuro changes  post TPA precautions in ICU

## 2019-08-15 NOTE — DIETITIAN INITIAL EVALUATION ADULT. - ADD RECOMMEND
Recommend SLP evaluation. Recommend endo/CDE consultation. Will remain available for DM/CVA nutrition therapy when medically appropriate. Recommend SLP evaluation. Recommend Consistent Carbohydrate, Dash/TLC diet with recommendations per SLP on diet texture/liquid consistency. Recommend endo/CDE consultation. Will remain available for DM/CVA nutrition therapy when medically appropriate.

## 2019-08-15 NOTE — PROGRESS NOTE ADULT - SUBJECTIVE AND OBJECTIVE BOX
Patient states around 6pm-6:30pm he was sitting at table with friends when started having slurred speech and R sided arm and LE weakness, numbness and tingling.  Also felt confused and disoriented at times, associated with some blurry vision.  Patient waited about 2 hours before presenting to ED for persistent symptoms.  In the ED, received TPA for CVA.  Patient states currently still feels numbness/ tingling (pins and needles) in RUE and RLE however blurry vision comes and goes s/p TPA in the ER. Denies chest pain, palpitations, headache, n/v, abd pain, urinary or bowel incontinence.  Denies lightheadedness/ dizziness, no fall or LOC.  Patient does admit to some SOB when symptoms started, now resolved.  Admits to significant weakness on R side not baseline.  Of note patient recently admitted to [facility] and transferred to [facility] on 7/31/2019 for chest pain, had cardiac cath done showed mild LAD disease, 90% RCA and 100% RPL, had BMS to RCA placed.  His baby asa was stopped but was started on plavix and Eliquis on discharge 8/9/19, since discharge patient has not had Eliquis because not covered by insurance (only was taking plavix).  Patient states he has family history of stroke on both sides of family, mother had some genetic coagulopathy (Factor V Leiden sounded familiar but not sure), however he was tested for genetic coagulopathies in 5/2011 and was negative.      Interval events: S/p TPA at 22:00 with some improvement in slurred speech, right sided weakness. Had a headache overnight which resolved with tylenol. **    Review of Systems:  Constitutional: no fever, chills, fatigue  Neuro: no headache, numbness, weakness***  Resp: no cough, wheezing, shortness of breath  CVS: no chest pain, palpitations, leg swelling  GI: no abdominal pain, nausea, vomiting, diarrhea   : no dysuria, frequency, incontinence  Psych: no depression, anxiety    T(F): 98 (08-15-19 @ 07:20), Max: 99.6 (08-14-19 @ 20:55)  HR: 54 (08-15-19 @ 07:00) (49 - 90)  BP: 122/63 (08-15-19 @ 07:00) (95/53 - 154/70)  RR: 21 (08-15-19 @ 07:00) (15 - 28)  SpO2: 95% (08-15-19 @ 07:00) (94% - 100%) on room air  Wt(kg): --    CAPILLARY BLOOD GLUCOSE    POCT Blood Glucose.: 304 mg/dL (14 Aug 2019 21:07)    I&O's Summary    14 Aug 2019 07:01  -  15 Aug 2019 07:00  --------------------------------------------------------  IN: 1100 mL / OUT: 1600 mL / NET: -500 mL    Physical Exam: ***    Gen:  Neuro:  HEENT:  CV:  Pulm:  GI:  Ext:  Skin:    Meds:  ondansetron Injectable 4 milliGRAM(s) IV Push every 6 hours PRN    Labs                      8.5    9.79  )-----------( 378      ( 15 Aug 2019 06:55 )             27.3   WBC from 10.28  H/h from 9.4/31.1    08-15    143  |  109<H>  |  11  ----------------------------<  116<H>  4.0   |  28  |  0.82    Ca    8.4<L>      15 Aug 2019 06:55  Phos  3.8     08-15  Mg     2.0     08-15    TPro  7.0  /  Alb  3.0<L>  /  TBili  0.2  /  DBili  x   /  AST  17  /  ALT  16  /  AlkPhos  137<H>  08-14    PT/INR - ( 14 Aug 2019 21:17 )   PT: 11.9 sec;   INR: 1.05 ratio       PTT - ( 14 Aug 2019 21:17 )  PTT:32.2 sec    Radiology:     Bedside Ani< from: CT Angio Neck w/ IV Cont (08.14.19 @ 21:43) >    IMPRESSION:  Noncontrast head CT:   Stable exam. No acute intracranial hemorrhage or mass effect vasogenic   edema.    CTA neck:  No major vessel occlusion, hemodynamically significant stenosis according   to the NASCET criteria or dissection.    CTA head:  No majorvessel occlusion or hemodynamically significant stenosis. 3.7 x   4.1 x 5.4 mm lobulated right MCA bifurcation aneurysm.    < end of copied text >        Lung U/S: ***    Bedside Cardiac U/S: ***    < end of copied text >  CENTRAL LINE: Y/N   DATE INSERTED:   REMOVE: Y/N    RODRIGUEZ: Y/N      DATE INSERTED:        REMOVE: Y/N    A-LINE: Y/N     DATE INSERTED:              REMOVE: Y/N    GLOBAL ISSUE/BEST PRACTICE:  Analgesia:  Sedation:  HOB elevation: yes  Stress ulcer prophylaxis:  VTE prophylaxis:  Glycemic control:  Nutrition:    CODE STATUS: *** Interval events: S/p TPA at 22:00 with some improvement in slurred speech, right sided weakness. Had a headache overnight which resolved with Tylenol He feels that his speech is at baseline but his weakness and numbness are unchanged. He denies headache, blurry vision, bleeding, dizziness, chest pain, shortness of breath.      Review of Systems:  Constitutional: no fever, chills, fatigue  Neuro: numbness, weakness. no headache,   Resp: no cough, wheezing, shortness of breath  CVS: no chest pain, palpitations, leg swelling  GI: no abdominal pain, nausea, vomiting, diarrhea   : no dysuria    T(F): 98 (08-15-19 @ 07:20), Max: 99.6 (08-14-19 @ 20:55)  HR: 54 (08-15-19 @ 07:00) (49 - 90)  BP: 122/63 (08-15-19 @ 07:00) (95/53 - 154/70)  RR: 21 (08-15-19 @ 07:00) (15 - 28)  SpO2: 95% (08-15-19 @ 07:00) (94% - 100%) on room air  Wt(kg): --    CAPILLARY BLOOD GLUCOSE    POCT Blood Glucose.: 304 mg/dL (14 Aug 2019 21:07)    I&O's Summary    14 Aug 2019 07:01  -  15 Aug 2019 07:00  --------------------------------------------------------  IN: 1100 mL / OUT: 1600 mL / NET: -500 mL    Physical Exam:   Gen: Resting in no acute distress, easily awakes  Neuro: AOx4. Speech wnl, no deficits with reading, writing, repetition, naming. 3/5 strength of RUE, RLE. Decreased sensation to light touch on right face, rue, rle. No facial droop. +pronator drift.  HEENT: Normocephalic, atraumatic. Poor dentition.   CV: RRR, no m/r/g  Pulm: CTAB  GI: Soft, non-tender, non-distended  Ext: No edema  Skin: Warm, well-perfused.    Meds:  ondansetron Injectable 4 milliGRAM(s) IV Push every 6 hours PRN    Labs                      8.5    9.79  )-----------( 378      ( 15 Aug 2019 06:55 )             27.3   WBC from 10.28  H/h from 9.4/31.1    08-15    143  |  109<H>  |  11  ----------------------------<  116<H>  4.0   |  28  |  0.82    Ca    8.4<L>      15 Aug 2019 06:55  Phos  3.8     08-15  Mg     2.0     08-15    TPro  7.0  /  Alb  3.0<L>  /  TBili  0.2  /  DBili  x   /  AST  17  /  ALT  16  /  AlkPhos  137<H>  08-14    PT/INR - ( 14 Aug 2019 21:17 )   PT: 11.9 sec;   INR: 1.05 ratio       PTT - ( 14 Aug 2019 21:17 )  PTT:32.2 sec    Radiology:     Bedside Ani< from: CT Angio Neck w/ IV Cont (08.14.19 @ 21:43) >    IMPRESSION:  Noncontrast head CT:   Stable exam. No acute intracranial hemorrhage or mass effect vasogenic   edema.    CTA neck:  No major vessel occlusion, hemodynamically significant stenosis according   to the NASCET criteria or dissection.    CTA head:  No majorvessel occlusion or hemodynamically significant stenosis. 3.7 x   4.1 x 5.4 mm lobulated right MCA bifurcation aneurysm.    < end of copied text >      < end of copied text >  CENTRAL LINE: N       RODRIGUEZ: Y chronic    A-LINE: N        GLOBAL ISSUE/BEST PRACTICE:  Analgesia: no  Sedation: no  HOB elevation: yes  Stress ulcer prophylaxis: yes  VTE prophylaxis: yes  Glycemic control: yes  Nutrition: yes    CODE STATUS: Full

## 2019-08-15 NOTE — PROGRESS NOTE ADULT - SUBJECTIVE AND OBJECTIVE BOX
neuro cons dict.  presented with right sided weakness/slurred speech  cw left hemispheric ischemia; sp tpa.  right mca aneurysm.  brain mri.  echo.  lipid panel/hb1c.

## 2019-08-15 NOTE — SWALLOW BEDSIDE ASSESSMENT ADULT - SWALLOW EVAL: DIAGNOSIS
1. The patient demonstrated functional oral management of puree, solid, nectar thick, and thin liquid textures marked by adequate bolus collection, transfer, and posterior transport. 2. The patient demonstrated a functional pharyngeal phase of the swallow for puree, solid, nectar thick, and thin liquid textures marked by a timely pharyngeal swallow trigger with adequate hyolaryngeal elevation upon digital palpation w/o evidence of airway penetration.

## 2019-08-15 NOTE — PROGRESS NOTE ADULT - ASSESSMENT
49M with PMH CAD s/p BMS to RCA (8/2019), RUL subsegmental PE (6/2019, on Eliquis), hx of NSTEMI and PAF s/p ex-lap omentopexy and plication for perforated antral ulcer (5/2019), osteomyelitis s/p debridement,  HTN and DM2 (not on insulin) presenting to the ED after having slurred speech and R sided weakness admitted for CVA, s/p TPA in the ED now in ICU for post TPA management.

## 2019-08-15 NOTE — DIETITIAN INITIAL EVALUATION ADULT. - PROBLEM SELECTOR PLAN 9
-Incidentally found to have 3.7 x 4.1 x 5.4 mm lobulated right MCA bifurcation aneurysm.   No major vessel occlusion or hemodynamically significant stenosis.   -f/u outpatient, no acute intervention

## 2019-08-15 NOTE — CONSULT NOTE ADULT - SUBJECTIVE AND OBJECTIVE BOX
[facility] - Bolivar Carbajal, El, Brtitany, Zahra, Manuel Jacobs  Office Number: [phone]    Initial Consult Note    CHIEF COMPLAINT: Patient is a 49y old  Male who presents with a chief complaint of CVA (14 Aug 2019 23:03)      HPI:  49M with PMH CAD s/p BMS to RCA (8/2019), RUL subsegmental PE (6/2019, on Eliquis), hx of NSTEMI and PAF s/p ex-lap omentopexy and plication for perforated antral ulcer (5/2019), osteomyelitis s/p debridement,  HTN and DM2 (not on insulin) presenting to the ED after having slurred speech and R sided weakness.   Patient states around 6pm-6:30pm he was sitting at table with friends when started having slurred speech and R sided arm and LE weakness, numbness and tingling.  Also felt confused and disoriented at times, associated with some blurry vision.  Patient waited about 2 hours before presenting to ED for persistent symptoms.  In the ED, received TPA for CVA.  Patient states currently still feels numbness/ tingling (pins and needles) in RUE and RLE however blurry vision comes and goes s/p TPA in the ER. Denies chest pain, palpitations, headache, n/v, abd pain, urinary or bowel incontinence.  Denies lightheadedness/ dizziness, no fall or LOC.  Patient does admit to some SOB when symptoms started, now resolved.  Admits to significant weakness on R side not baseline.  Of note patient recently admitted to [place] and transferred to [place] on 7/31/2019 for chest pain, had cardiac cath done showed mild LAD disease, 90% RCA and 100% RPL, had BMS to RCA placed.  His baby asa was stopped but was started on plavix and Eliquis on discharge 8/9/19, since discharge patient has not had Eliquis because not covered by insurance (only was taking plavix).  Patient states he has family history of stroke on both sides of family, mother had some genetic coagulopathy (Factor V Leiden sounded familiar but not sure), however he was tested for genetic coagulopathies in 5/2011 and was negative.    In the ED, T 99.6F, HR 80, /74, RR 15, SpO2 97% on RA.  Labs significant for H/H 9.4/31.1 (baseline around 9.5-10.5), PT/INR 11.9/1.05, glucose 296.  Received TPA in the ED.    He is now s/p tpa with some neurologic improvement.  He is now s/p cath with mild LAD disease, 90% RCA and 100% RPL. He is now s/p BMS to RCA on 8/1/2019.  He reports not taking his eliquis for the last 3 days.    PAST MEDICAL & SURGICAL HISTORY:  CAD S/P percutaneous coronary angioplasty  Osteomyelitis: s/p debridement  History of non-ST elevation myocardial infarction (NSTEMI)  Pulmonary embolism  Perforated gastric ulcer: s/p emergent ex-lap omentopexy and plication 6/2019  BPH (benign prostatic hyperplasia)  Hypertension  Afib  Diabetes mellitus with no complication  Diabetes  Traumatic amputation of left foot, initial encounter  Perforated gastric ulcer  H/O abdominal surgery      SOCIAL HISTORY:  No tobacco, ethanol, or drug abuse.    FAMILY HISTORY:  FH: stroke: Father  FH: coronary artery disease: Father  FH: pulmonary embolism: Mother      MEDICATIONS  (STANDING):    MEDICATIONS  (PRN):  ondansetron Injectable 4 milliGRAM(s) IV Push every 6 hours PRN Nausea and/or Vomiting      Allergies    fish (Hives)  No Known Drug Allergies    Intolerances        REVIEW OF SYSTEMS:    CONSTITUTIONAL: No weakness, fevers or chills  EYES/ENT: No visual changes;  No vertigo or throat pain   NECK: No pain or stiffness  RESPIRATORY: No cough, wheezing, hemoptysis; No shortness of breath  CARDIOVASCULAR: No chest pain or palpitations  GASTROINTESTINAL: No abdominal pain. No nausea, vomiting, or hematemesis; No diarrhea or constipation. No melena or hematochezia.  GENITOURINARY: No dysuria, frequency or hematuria  NEUROLOGICAL: No numbness or weakness  SKIN: No itching or rash  All other review of systems is negative unless indicated above    VITAL SIGNS:   Vital Signs Last 24 Hrs  T(C): 37.1 (15 Aug 2019 03:25), Max: 37.6 (14 Aug 2019 20:55)  T(F): 98.8 (15 Aug 2019 03:25), Max: 99.6 (14 Aug 2019 20:55)  HR: 49 (15 Aug 2019 06:30) (49 - 90)  BP: 107/60 (15 Aug 2019 06:30) (95/53 - 154/70)  BP(mean): 78 (15 Aug 2019 06:30) (71 - 106)  RR: 17 (15 Aug 2019 06:30) (15 - 28)  SpO2: 94% (15 Aug 2019 06:30) (94% - 100%)    I&O's Summary    14 Aug 2019 07:01  -  15 Aug 2019 07:00  --------------------------------------------------------  IN: 1100 mL / OUT: 1600 mL / NET: -500 mL        On Exam:    Constitutional: NAD, alert and oriented x 3  Lungs:  Non-labored, breath sounds are clear bilaterally, No wheezing, rales or rhonchi  Cardiovascular: irregular,  S1 and S2 positive.  No murmurs, rubs, gallops or clicks  Gastrointestinal: Bowel Sounds present, soft, nontender.   Lymph: No peripheral edema. No cervical lymphadenopathy.  Neurological: Alert, decreased strength right upper and lower extremity  Skin: No rashes or ulcers   Psych:  Mood & affect appropriate.    LABS: All Labs Reviewed:                        8.5    9.79  )-----------( 378      ( 15 Aug 2019 06:55 )             27.3                         9.4    10.28 )-----------( 459      ( 14 Aug 2019 21:17 )             31.1     15 Aug 2019 06:55    143    |  109    |  11     ----------------------------<  116    4.0     |  28     |  0.82   14 Aug 2019 21:17    141    |  104    |  15     ----------------------------<  296    4.4     |  27     |  1.10     Ca    8.4        15 Aug 2019 06:55  Ca    9.0        14 Aug 2019 21:17    TPro  7.0    /  Alb  3.0    /  TBili  0.2    /  DBili  x      /  AST  17     /  ALT  16     /  AlkPhos  137    14 Aug 2019 21:17    PT/INR - ( 14 Aug 2019 21:17 )   PT: 11.9 sec;   INR: 1.05 ratio         PTT - ( 14 Aug 2019 21:17 )  PTT:32.2 sec      Blood Culture:         RADIOLOGY:    EKG: sb, ant infarct

## 2019-08-15 NOTE — SWALLOW BEDSIDE ASSESSMENT ADULT - COMMENTS
The patient was seen this AM for an initial assessment of swallow function, at which time he was alert and cooperative. Patient reporting noticing improvements in his speech. He is denying any swallowing difficulties at this time. Per charting, the patient is a "48 y/o M with PMH CAD s/p BMS to RCA (8/2019), RUL subsegmental PE (6/2019, on Eliquis), hx of NSTEMI and PAF s/p ex-lap omentopexy and plication for perforated antral ulcer (5/2019), osteomyelitis s/p debridement,  HTN and DM2 (not on insulin) presenting to the ED after having slurred speech and R sided weakness.   Patient states around 6pm-6:30pm he was sitting at table with friends when started having slurred speech and R sided arm and LE weakness, numbness and tingling.  Also felt confused and disoriented at times, associated with some blurry vision.  Patient waited about 2 hours before presenting to ED for persistent symptoms.  In the ED, received TPA for CVA.  Patient states currently still feels numbness/ tingling (pins and needles) in RUE and RLE however blurry vision comes and goes s/p TPA in the ER." Recent CXR revealed, "Clear lungs, unchanged." Recent CTA of the head revealed, "No major vessel occlusion or hemodynamically significant stenosis. 3.7 x 4.1 x 5.4 mm lobulated right MCA bifurcation aneurysm." In addition, noncontrast CT of the head revealed, "Stable exam. No acute intracranial hemorrhage or mass effect vasogenic edema." Discussed results and recommendations from this evaluation with the patient, RN, and PA on unit.

## 2019-08-15 NOTE — PROGRESS NOTE ADULT - SUBJECTIVE AND OBJECTIVE BOX
Patient is a 49y old  Male who presents with a chief complaint of CVA (15 Aug 2019 14:21)    24 hour events: Pt seen and examined at bedside. Chart/labs reviewed.   PAST MEDICAL & SURGICAL HISTORY:  CAD S/P percutaneous coronary angioplasty  Osteomyelitis: s/p debridement  History of non-ST elevation myocardial infarction (NSTEMI)  Pulmonary embolism  Perforated gastric ulcer: s/p emergent ex-lap omentopexy and plication 2019  BPH (benign prostatic hyperplasia)  Hypertension  Afib  Diabetes mellitus with no complication  Diabetes  Traumatic amputation of left foot, initial encounter  Perforated gastric ulcer  H/O abdominal surgery      Review of Systems:  Constitutional: No fever, chills, fatigue  Neuro: No headache, numbness, weakness  Resp: No cough, wheezing, shortness of breath  CVS: No chest pain, palpitations, leg swelling  GI: No abdominal pain, nausea, vomiting, diarrhea   : No dysuria, frequency, incontinence  Skin: No itching, burning, rashes, or lesions   Msk: No joint pain or swelling  Psych: No depression, anxiety, mood swings    T(F): 98.3 (08-15-19 @ 15:48), Max: 99.6 (19 @ 20:55)  HR: 61 (08-15-19 @ 19:00) (49 - 90)  BP: 132/62 (08-15-19 @ 19:00) (95/53 - 154/70)  RR: 22 (08-15-19 @ 19:00) (14 - 28)  SpO2: 97% (08-15-19 @ 19:00) (91% - 100%)  Wt(kg): --        CAPILLARY BLOOD GLUCOSE      POCT Blood Glucose.: 174 mg/dL (15 Aug 2019 17:27)      I&O's Summary    14 Aug 2019 07:  -  15 Aug 2019 07:00  --------------------------------------------------------  IN: 1100 mL / OUT: 1700 mL / NET: -600 mL    15 Aug 2019 07:  -  15 Aug 2019 19:47  --------------------------------------------------------  IN: 0 mL / OUT: 1075 mL / NET: -1075 mL        Physical Exam:     Gen:   Neuro: A&Ox3, non-focal  HEENT: NC/AT  Resp: CTA b/l  CVS: nl S1/S2, RRR  Abd: soft, nt, nd, +bs  Ext: no edema, +pulses  Skin: well perfused, warm    Meds:    tamsulosin Oral    atorvastatin Oral  dextrose 40% Gel Oral PRN  dextrose 50% Injectable IV Push  dextrose 50% Injectable IV Push  dextrose 50% Injectable IV Push  dextrose 50% Injectable IV Push  dextrose 50% Injectable IV Push  dextrose 50% Injectable IV Push  glucagon  Injectable IntraMuscular PRN  glucagon  Injectable IntraMuscular PRN  insulin lispro (HumaLOG) corrective regimen sliding scale SubCutaneous  insulin lispro (HumaLOG) corrective regimen sliding scale SubCutaneous      ondansetron Injectable IV Push PRN      apixaban Oral  clopidogrel Tablet Oral    pantoprazole    Tablet Oral  sucralfate Oral      dextrose 5%. IV Continuous  dextrose 5%. IV Continuous                                  8.5    9.79  )-----------( 378      ( 15 Aug 2019 06:55 )             27.3       08-15    143  |  109<H>  |  11  ----------------------------<  116<H>  4.0   |  28  |  0.82    Ca    8.4<L>      15 Aug 2019 06:55  Phos  3.8     08-15  Mg     2.0     08-15    TPro  7.0  /  Alb  3.0<L>  /  TBili  0.2  /  DBili  x   /  AST  17  /  ALT  16  /  AlkPhos  137<H>  08-14          PT/INR - ( 14 Aug 2019 21:17 )   PT: 11.9 sec;   INR: 1.05 ratio         PTT - ( 14 Aug 2019 21:17 )  PTT:32.2 sec  Urinalysis Basic - ( 15 Aug 2019 14:35 )    Color: Yellow / Appearance: Clear / S.010 / pH: x  Gluc: x / Ketone: Negative  / Bili: Negative / Urobili: Negative   Blood: x / Protein: 25 mg/dL / Nitrite: Negative   Leuk Esterase: Negative / RBC: 11-25 /HPF / WBC Negative   Sq Epi: x / Non Sq Epi: Occasional / Bacteria: Negative              CXR:    CTH:    CT Chest:    CT A/P:    TTE:        CENTRAL LINE: yes/no    RODRIGUEZ: yes/no    A-LINE: yes/no    GLOBAL ISSUE/BEST PRACTICE:  Analgesia:  Sedation:  HOB elevation: yes  Stress ulcer prophylaxis:  VTE prophylaxis:  Glycemic control:  Nutrition:      CODE STATUS: ***  GOC discussion: Y  Critical care time spent (mins): ***  (Reviewing data, imaging, discussing with multidisciplinary team, non inclusive of procedures, discussing goals of care with patient/family) In Brief:  49M with PMHx of cad s/p stent to RCA 19, NSTEMI, perforated antral ulcer s/p emergent ex-lap, omentopexy and plication 2019 (Dr. Mejia), afib, recent PE (on eliquis), dm, diabetic neuropathy, OM of left great toe s/p amputation, htn, who presented to ED with c/p slurred speech and right sided weakness. Symptoms started around 6 pm tonight. CTH did not show any acute CVA/bleed but did reveal "3.7 x 4.1 x 5.4 mm lobulated right MCA bifurcation aneurysm." Case D/W [facility] by ED Physician and recommended to give tPA. ICU now consulted for post tPA management    24 hour events: Pt seen and examined at bedside. Chart/labs reviewed. MRI obtained, no acute infarct seen. Passed swallow evaluation and started on PO diet.     PAST MEDICAL & SURGICAL HISTORY:  CAD S/P percutaneous coronary angioplasty  Osteomyelitis: s/p debridement  History of non-ST elevation myocardial infarction (NSTEMI)  Pulmonary embolism  Perforated gastric ulcer: s/p emergent ex-lap omentopexy and plication 2019  BPH (benign prostatic hyperplasia)  Hypertension  Afib  Diabetes mellitus with no complication  Diabetes  Traumatic amputation of left foot, initial encounter  Perforated gastric ulcer  H/O abdominal surgery      Review of Systems:  Constitutional: No fever, chills, fatigue  Neuro: No headache, +numbness, +weakness  Resp: No cough, wheezing, shortness of breath  CVS: No chest pain, palpitations, leg swelling  GI: No abdominal pain, nausea, vomiting, diarrhea   : No dysuria, frequency, incontinence  Skin: No itching, burning, rashes, or lesions   Msk: No joint pain or swelling  Psych: No depression, anxiety, mood swings      T(F): 98.3 (08-15-19 @ 15:48), Max: 99.6 (19 @ 20:55)  HR: 61 (08-15-19 @ 19:00) (49 - 90)  BP: 132/62 (08-15-19 @ 19:00) (95/53 - 154/70)  RR: 22 (08-15-19 @ 19:00) (14 - 28)  SpO2: 97% (08-15-19 @ 19:00) (91% - 100%)  Wt(kg): --        CAPILLARY BLOOD GLUCOSE      POCT Blood Glucose.: 174 mg/dL (15 Aug 2019 17:27)      I&O's Summary    14 Aug 2019 07:01  -  15 Aug 2019 07:00  --------------------------------------------------------  IN: 1100 mL / OUT: 1700 mL / NET: -600 mL    15 Aug 2019 07:01  -  15 Aug 2019 19:47  --------------------------------------------------------  IN: 0 mL / OUT: 1075 mL / NET: -1075 mL        Physical Exam:     Gen: No distress  Neuro: A&Ox3, Right sided weakness.   HEENT: NC/AT, Poor dentition  Resp: CTA b/l  CVS: nl S1/S2, RRR  Abd: soft, nt, nd, +bs  Ext: no edema, +pulses. Left Great toe amputation  Skin: well perfused, warm      Meds:    tamsulosin Oral  atorvastatin Oral  insulin lispro (HumaLOG) corrective regimen sliding scale SubCutaneous  insulin lispro (HumaLOG) corrective regimen sliding scale SubCutaneous  ondansetron Injectable IV Push PRN  apixaban Oral  clopidogrel Tablet Oral  pantoprazole    Tablet Oral  sucralfate Oral  dextrose 5%. IV Continuous  dextrose 5%. IV Continuous                              8.5    9.79  )-----------( 378      ( 15 Aug 2019 06:55 )             27.3       08-15    143  |  109<H>  |  11  ----------------------------<  116<H>  4.0   |  28  |  0.82    Ca    8.4<L>      15 Aug 2019 06:55  Phos  3.8     08-15  Mg     2.0     08-15    TPro  7.0  /  Alb  3.0<L>  /  TBili  0.2  /  DBili  x   /  AST  17  /  ALT  16  /  AlkPhos  137<H>            PT/INR - ( 14 Aug 2019 21:17 )   PT: 11.9 sec;   INR: 1.05 ratio         PTT - ( 14 Aug 2019 21:17 )  PTT:32.2 sec  Urinalysis Basic - ( 15 Aug 2019 14:35 )    Color: Yellow / Appearance: Clear / S.010 / pH: x  Gluc: x / Ketone: Negative  / Bili: Negative / Urobili: Negative   Blood: x / Protein: 25 mg/dL / Nitrite: Negative   Leuk Esterase: Negative / RBC: 11-25 /HPF / WBC Negative   Sq Epi: x / Non Sq Epi: Occasional / Bacteria: Negative              MRI:  PROCEDURE DATE: 08/15/2019         INTERPRETATION: Exam Date: 8/15/2019 5:01 PM     MR brain without gadolinium     CLINICAL INFORMATION: aneurysm     TECHNIQUE: Multiplanar imaging of the brain was performed.     COMPARISON: CTA 2019     FINDINGS:   No abnormal signal within the brain parenchyma. There is no evidence of   acute infarct, hemorrhage or mass lesion. There is no midline shift or   herniation pattern. No mass effect is found in the brain.     The ventricles, sulci and basal cisterns appear unremarkable.     The vertebral and internal carotid arteries demonstrate expected flow voids   indicating their patency.     The paranasal sinuses are clear.     There is fluid in the left mastoid air cells.     IMPRESSION: Normal MRI of the brain. No acute infarct. Small amount of   fluid in the left mastoid air cells.       TTE:  Obtained, pending report      CENTRAL LINE: no    RODRIGUEZ: yes    A-LINE: no    GLOBAL ISSUE/BEST PRACTICE:  Analgesia: yes  Sedation: no  HOB elevation: yes  Stress ulcer prophylaxis:   VTE prophylaxis: yes  Glycemic control: yes  Nutrition: yes      CODE STATUS: Full  GOC discussion: Y

## 2019-08-15 NOTE — SWALLOW BEDSIDE ASSESSMENT ADULT - ADDITIONAL RECOMMENDATIONS
Skilled ST services are not warranted at this time. Re-consult this department should any new concerns/complaints arise regarding the patient's speech/language/swallow function.

## 2019-08-15 NOTE — CONSULT NOTE ADULT - ASSESSMENT
Mr. Villareal is a 49 M with PAF on Eliquis, RUL subsegmental PE, hx of recent NSTEMI, perforated antral ulcer, osteomyelitis s/p debridement, HTN and DM.  He was recently admitted with chest pain and is s/p cath with mild LAD disease, 90% RCA and 100% RPL. He is now s/p BMS to RCA on 8/1/2019  He now presents with acute right sided weakness and slurred speech and is s/p tpa.    - He is s/p BMS on 8/1/2019. He is now s/p 1 week course of triple therapy, and should now be on Plavix and full dose a/c.  - Please restart Plavix asap when no neurologic contraindication.    - SR on telemetry, with no additional AF. Restart Amiodarone with hold parameters  - BB had been held in the setting of bradycardia  - Full dose a/c when no neurologic contraindication    - restart statin if can swallow  - Euvolemic on exam. Can hold po Lasix temporarily.     - Watch creatinine and electrolytes. Keep K>4, Mg>2  - The importance of strict follow up has been stressed.  - Will follow with you. Mr. Villareal is a 49 M with PAF on Eliquis, RUL subsegmental PE, hx of recent NSTEMI, perforated antral ulcer, osteomyelitis s/p debridement, HTN and DM.  He was recently admitted with chest pain and is s/p cath with mild LAD disease, 90% RCA and 100% RPL. He is now s/p BMS to RCA on 8/1/2019  He now presents with acute right sided weakness and slurred speech and is s/p tpa.    - He is s/p BMS on 8/1/2019. He is now s/p 1 week course of triple therapy, and should now be on Plavix and full dose a/c.  - Please restart Plavix asap when no neurologic contraindication.  - No sign of acute ischemia. EKG is unremarkable.    - SR on telemetry, with no additional AF. Restart Amiodarone with hold parameters  - BB had been held in the setting of bradycardia  - Full dose a/c when no neurologic contraindication    - restart statin if can swallow  - Euvolemic on exam. Can hold po Lasix temporarily.     - Watch creatinine and electrolytes. Keep K>4, Mg>2  - The importance of strict follow up has been stressed.  - Will follow with you.

## 2019-08-16 LAB
ANION GAP SERPL CALC-SCNC: 7 MMOL/L — SIGNIFICANT CHANGE UP (ref 5–17)
BASOPHILS # BLD AUTO: 0.03 K/UL — SIGNIFICANT CHANGE UP (ref 0–0.2)
BASOPHILS NFR BLD AUTO: 0.4 % — SIGNIFICANT CHANGE UP (ref 0–2)
BUN SERPL-MCNC: 14 MG/DL — SIGNIFICANT CHANGE UP (ref 7–23)
CALCIUM SERPL-MCNC: 8.8 MG/DL — SIGNIFICANT CHANGE UP (ref 8.5–10.1)
CHLORIDE SERPL-SCNC: 107 MMOL/L — SIGNIFICANT CHANGE UP (ref 96–108)
CO2 SERPL-SCNC: 29 MMOL/L — SIGNIFICANT CHANGE UP (ref 22–31)
CREAT SERPL-MCNC: 1.1 MG/DL — SIGNIFICANT CHANGE UP (ref 0.5–1.3)
EOSINOPHIL # BLD AUTO: 0.51 K/UL — HIGH (ref 0–0.5)
EOSINOPHIL NFR BLD AUTO: 6.8 % — HIGH (ref 0–6)
GLUCOSE SERPL-MCNC: 186 MG/DL — HIGH (ref 70–99)
HCT VFR BLD CALC: 29.5 % — LOW (ref 39–50)
HGB BLD-MCNC: 8.9 G/DL — LOW (ref 13–17)
IMM GRANULOCYTES NFR BLD AUTO: 0.1 % — SIGNIFICANT CHANGE UP (ref 0–1.5)
LYMPHOCYTES # BLD AUTO: 1.89 K/UL — SIGNIFICANT CHANGE UP (ref 1–3.3)
LYMPHOCYTES # BLD AUTO: 25.3 % — SIGNIFICANT CHANGE UP (ref 13–44)
MAGNESIUM SERPL-MCNC: 2.1 MG/DL — SIGNIFICANT CHANGE UP (ref 1.6–2.6)
MCHC RBC-ENTMCNC: 26.5 PG — LOW (ref 27–34)
MCHC RBC-ENTMCNC: 30.2 GM/DL — LOW (ref 32–36)
MCV RBC AUTO: 87.8 FL — SIGNIFICANT CHANGE UP (ref 80–100)
MONOCYTES # BLD AUTO: 0.74 K/UL — SIGNIFICANT CHANGE UP (ref 0–0.9)
MONOCYTES NFR BLD AUTO: 9.9 % — SIGNIFICANT CHANGE UP (ref 2–14)
NEUTROPHILS # BLD AUTO: 4.3 K/UL — SIGNIFICANT CHANGE UP (ref 1.8–7.4)
NEUTROPHILS NFR BLD AUTO: 57.5 % — SIGNIFICANT CHANGE UP (ref 43–77)
NRBC # BLD: 0 /100 WBCS — SIGNIFICANT CHANGE UP (ref 0–0)
PHOSPHATE SERPL-MCNC: 4.1 MG/DL — SIGNIFICANT CHANGE UP (ref 2.5–4.5)
PLATELET # BLD AUTO: 391 K/UL — SIGNIFICANT CHANGE UP (ref 150–400)
POTASSIUM SERPL-MCNC: 4.1 MMOL/L — SIGNIFICANT CHANGE UP (ref 3.5–5.3)
POTASSIUM SERPL-SCNC: 4.1 MMOL/L — SIGNIFICANT CHANGE UP (ref 3.5–5.3)
RBC # BLD: 3.36 M/UL — LOW (ref 4.2–5.8)
RBC # FLD: 13.7 % — SIGNIFICANT CHANGE UP (ref 10.3–14.5)
SODIUM SERPL-SCNC: 143 MMOL/L — SIGNIFICANT CHANGE UP (ref 135–145)
WBC # BLD: 7.48 K/UL — SIGNIFICANT CHANGE UP (ref 3.8–10.5)
WBC # FLD AUTO: 7.48 K/UL — SIGNIFICANT CHANGE UP (ref 3.8–10.5)

## 2019-08-16 PROCEDURE — 99291 CRITICAL CARE FIRST HOUR: CPT

## 2019-08-16 PROCEDURE — 99233 SBSQ HOSP IP/OBS HIGH 50: CPT | Mod: GC

## 2019-08-16 RX ORDER — INSULIN GLARGINE 100 [IU]/ML
8 INJECTION, SOLUTION SUBCUTANEOUS AT BEDTIME
Refills: 0 | Status: DISCONTINUED | OUTPATIENT
Start: 2019-08-16 | End: 2019-08-18

## 2019-08-16 RX ORDER — ISOSORBIDE MONONITRATE 60 MG/1
30 TABLET, EXTENDED RELEASE ORAL DAILY
Refills: 0 | Status: DISCONTINUED | OUTPATIENT
Start: 2019-08-16 | End: 2019-08-17

## 2019-08-16 RX ORDER — APIXABAN 2.5 MG/1
1 TABLET, FILM COATED ORAL
Qty: 60 | Refills: 0
Start: 2019-08-16 | End: 2019-09-14

## 2019-08-16 RX ORDER — INSULIN LISPRO 100/ML
VIAL (ML) SUBCUTANEOUS
Refills: 0 | Status: DISCONTINUED | OUTPATIENT
Start: 2019-08-16 | End: 2019-08-18

## 2019-08-16 RX ORDER — AMIODARONE HYDROCHLORIDE 400 MG/1
100 TABLET ORAL DAILY
Refills: 0 | Status: DISCONTINUED | OUTPATIENT
Start: 2019-08-16 | End: 2019-08-17

## 2019-08-16 RX ORDER — INSULIN LISPRO 100/ML
3 VIAL (ML) SUBCUTANEOUS
Refills: 0 | Status: DISCONTINUED | OUTPATIENT
Start: 2019-08-16 | End: 2019-08-18

## 2019-08-16 RX ADMIN — Medication 1 GRAM(S): at 17:06

## 2019-08-16 RX ADMIN — Medication 1 GRAM(S): at 06:11

## 2019-08-16 RX ADMIN — PANTOPRAZOLE SODIUM 40 MILLIGRAM(S): 20 TABLET, DELAYED RELEASE ORAL at 06:11

## 2019-08-16 RX ADMIN — APIXABAN 5 MILLIGRAM(S): 2.5 TABLET, FILM COATED ORAL at 21:24

## 2019-08-16 RX ADMIN — Medication 3 UNIT(S): at 12:09

## 2019-08-16 RX ADMIN — TAMSULOSIN HYDROCHLORIDE 0.4 MILLIGRAM(S): 0.4 CAPSULE ORAL at 21:24

## 2019-08-16 RX ADMIN — ISOSORBIDE MONONITRATE 30 MILLIGRAM(S): 60 TABLET, EXTENDED RELEASE ORAL at 17:05

## 2019-08-16 RX ADMIN — Medication 1: at 09:02

## 2019-08-16 RX ADMIN — Medication 1 GRAM(S): at 00:05

## 2019-08-16 RX ADMIN — ATORVASTATIN CALCIUM 80 MILLIGRAM(S): 80 TABLET, FILM COATED ORAL at 21:24

## 2019-08-16 RX ADMIN — APIXABAN 5 MILLIGRAM(S): 2.5 TABLET, FILM COATED ORAL at 09:02

## 2019-08-16 RX ADMIN — INSULIN GLARGINE 8 UNIT(S): 100 INJECTION, SOLUTION SUBCUTANEOUS at 21:31

## 2019-08-16 RX ADMIN — Medication 2: at 17:06

## 2019-08-16 RX ADMIN — Medication 3: at 12:09

## 2019-08-16 RX ADMIN — CLOPIDOGREL BISULFATE 75 MILLIGRAM(S): 75 TABLET, FILM COATED ORAL at 21:24

## 2019-08-16 RX ADMIN — AMIODARONE HYDROCHLORIDE 100 MILLIGRAM(S): 400 TABLET ORAL at 17:05

## 2019-08-16 RX ADMIN — Medication 3 UNIT(S): at 17:06

## 2019-08-16 RX ADMIN — Medication 1 GRAM(S): at 12:09

## 2019-08-16 NOTE — PROGRESS NOTE ADULT - SUBJECTIVE AND OBJECTIVE BOX
Patient is a 49y old  Male who presents with a chief complaint of CVA (16 Aug 2019 12:06)      INTERVAL HPI/OVERNIGHT EVENTS:  No acute events overnight. Patient seen and examined at bedside. States that he is feeling the same as yesterday. Complains of right sided numbness and weakness. Slurred speech has improved.       T(C): 36.9 (19 @ 11:32), Max: 37.1 (19 @ 00:07)  HR: 69 (19 @ 13:00) (50 - 70)  BP: 152/97 (19 @ 13:00) (109/58 - 152/97)  RR: 25 (19 @ 13:00) (15 - 26)  SpO2: 97% (19 @ 13:00) (90% - 98%)  Wt(kg): --    I&O's Summary    15 Aug 2019 07:  -  16 Aug 2019 07:00  --------------------------------------------------------  IN: 820 mL / OUT: 2935 mL / NET: -2115 mL    16 Aug 2019 07:01  -  16 Aug 2019 13:53  --------------------------------------------------------  IN: 0 mL / OUT: 700 mL / NET: -700 mL        LABS:                        8.9    7.48  )-----------( 391      ( 16 Aug 2019 05:13 )             29.5     08-16    143  |  107  |  14  ----------------------------<  186<H>  4.1   |  29  |  1.10    Ca    8.8      16 Aug 2019 05:13  Phos  4.1     08-  Mg     2.1     -    TPro  7.0  /  Alb  3.0<L>  /  TBili  0.2  /  DBili  x   /  AST  17  /  ALT  16  /  AlkPhos  137<H>  08-    PT/INR - ( 14 Aug 2019 21:17 )   PT: 11.9 sec;   INR: 1.05 ratio         PTT - ( 14 Aug 2019 21:17 )  PTT:32.2 sec  CAPILLARY BLOOD GLUCOSE      POCT Blood Glucose.: 267 mg/dL (16 Aug 2019 12:06)  POCT Blood Glucose.: 181 mg/dL (16 Aug 2019 08:35)  POCT Blood Glucose.: 117 mg/dL (15 Aug 2019 21:37)  POCT Blood Glucose.: 174 mg/dL (15 Aug 2019 17:27)      Urinalysis Basic - ( 15 Aug 2019 14:35 )    Color: Yellow / Appearance: Clear / S.010 / pH: x  Gluc: x / Ketone: Negative  / Bili: Negative / Urobili: Negative   Blood: x / Protein: 25 mg/dL / Nitrite: Negative   Leuk Esterase: Negative / RBC: 11-25 /HPF / WBC Negative   Sq Epi: x / Non Sq Epi: Occasional / Bacteria: Negative        Culture - Urine (collected 15 Aug 2019 11:08)  Source: .Urine Catheterized  Preliminary Report (16 Aug 2019 07:54):    >100,000 CFU/ml Gram Negative Rods    >100,000 CFU/ml Gram positive organisms         MEDICATIONS  (STANDING):  apixaban 5 milliGRAM(s) Oral two times a day  atorvastatin 80 milliGRAM(s) Oral at bedtime  clopidogrel Tablet 75 milliGRAM(s) Oral daily  dextrose 5%. 1000 milliLiter(s) (50 mL/Hr) IV Continuous <Continuous>  dextrose 5%. 1000 milliLiter(s) (50 mL/Hr) IV Continuous <Continuous>  dextrose 50% Injectable 12.5 Gram(s) IV Push once  dextrose 50% Injectable 25 Gram(s) IV Push once  dextrose 50% Injectable 25 Gram(s) IV Push once  dextrose 50% Injectable 12.5 Gram(s) IV Push once  dextrose 50% Injectable 25 Gram(s) IV Push once  dextrose 50% Injectable 25 Gram(s) IV Push once  insulin glargine Injectable (LANTUS) 8 Unit(s) SubCutaneous at bedtime  insulin lispro (HumaLOG) corrective regimen sliding scale   SubCutaneous three times a day with meals  insulin lispro (HumaLOG) corrective regimen sliding scale   SubCutaneous at bedtime  insulin lispro Injectable (HumaLOG) 3 Unit(s) SubCutaneous three times a day with meals  pantoprazole    Tablet 40 milliGRAM(s) Oral before breakfast  sucralfate 1 Gram(s) Oral four times a day  tamsulosin 0.4 milliGRAM(s) Oral at bedtime    MEDICATIONS  (PRN):  dextrose 40% Gel 15 Gram(s) Oral once PRN Blood Glucose LESS THAN 70 milliGRAM(s)/deciliter  glucagon  Injectable 1 milliGRAM(s) IntraMuscular once PRN Glucose LESS THAN 70 milligrams/deciliter  glucagon  Injectable 1 milliGRAM(s) IntraMuscular once PRN Glucose LESS THAN 70 milligrams/deciliter  ondansetron Injectable 4 milliGRAM(s) IV Push every 6 hours PRN Nausea and/or Vomiting      REVIEW OF SYSTEMS:  CONSTITUTIONAL: No fever, fatigue  ENMT: No difficulty hearing, vertigo; No sinus or throat pain  NECK: No pain or stiffness  RESPIRATORY: No cough, wheezing, chills, No shortness of breath  CARDIOVASCULAR: No chest pain, palpitations, dizziness  GASTROINTESTINAL: No abdominal or epigastric pain. No nausea, vomiting, No diarrhea or constipation  NEUROLOGICAL: No headaches, memory loss, +loss of strength RUE, RLE with numbness  SKIN: No itching, burning, rashes, or lesions   MUSCULOSKELETAL: No joint pain or swelling; No muscle, back, or extremity pain  PSYCHIATRIC: No depression, anxiety, mood swings, or difficulty sleeping      RADIOLOGY & ADDITIONAL TESTS:  MRI Brain - Normal MRI of the brain. No acute infarct. Small amount of   fluid in the left mastoid air cells.       Imaging Personally Reviewed:  [ x ] YES  [ ] NO    Consultant(s) Notes Reviewed:  [ x ] YES  [ ] NO    PHYSICAL EXAM:  GENERAL: NAD, resting comfortably in chair, calm, cooperative  HEAD: Atraumatic, Normocephalic  EYES: EOMI,  conjunctiva and sclera clear  ENMT: Moist mucous membranes  NECK: Supple, No JVD  NERVOUS SYSTEM: Alert & Oriented X3, Good concentration  CHEST/LUNG: Clear to aucsultation bilaterally; No rales, rhonchi, wheezing, or rubs  HEART: Regular rate and rhythm; No murmurs, rubs, or gallops  ABDOMEN: Soft, Nontender, Nondistended; Bowel sounds present  EXTREMITIES: 2+ Peripheral Pulses, No clubbing, cyanosis, or edema  SKIN: Warm, dry, well-perfused    Care Discussed with Consultants/Other Providers [ x ] YES  [ ] NO

## 2019-08-16 NOTE — PROGRESS NOTE ADULT - SUBJECTIVE AND OBJECTIVE BOX
Interval events: MRI negative, still with R sided weakness.***    Review of Systems:***  Constitutional: no fever, chills, fatigue  Neuro: no headache, numbness, weakness  Resp: no cough, wheezing, shortness of breath  CVS: no chest pain, palpitations, leg swelling  GI: no abdominal pain, nausea, vomiting, diarrhea   : no dysuria, frequency, incontinence  Skin: no itching, burning, rashes, or lesions   Msk: no joint pain or swelling  Psych: no depression, anxiety    T(F): 97.5 (19 @ 04:26), Max: 98.7 (19 @ 00:07)  HR: 67 (19 @ 07:00) (49 - 68)  BP: 137/64 (19 @ 07:00) (109/56 - 148/74)  RR: 15 (19 @ 07:00) (14 - 26)  SpO2: 94% (19 @ 07:00) (90% - 97%)  Wt(kg): --    CAPILLARY BLOOD GLUCOSE    POCT Blood Glucose.: 117 mg/dL (15 Aug 2019 21:37)    I&O's Summary    15 Aug 2019 07:01  -  16 Aug 2019 07:00  --------------------------------------------------------  IN: 820 mL / OUT: 2935 mL / NET: -2115 mL      Physical Exam: ***  Gen:  Neuro:  HEENT:  CV:  Pulm:  GI:  Ext:  Skin:    Meds:  apixaban 5 milliGRAM(s) Oral two times a day  clopidogrel Tablet 75 milliGRAM(s) Oral daily  tamsulosin 0.4 milliGRAM(s) Oral at bedtime  atorvastatin 80 milliGRAM(s) Oral at bedtime  dextrose 40% Gel 15 Gram(s) Oral once PRN  dextrose 50% Injectable 12.5 Gram(s) IV Push once  dextrose 50% Injectable 25 Gram(s) IV Push once  dextrose 50% Injectable 25 Gram(s) IV Push once  dextrose 50% Injectable 12.5 Gram(s) IV Push once  dextrose 50% Injectable 25 Gram(s) IV Push once  dextrose 50% Injectable 25 Gram(s) IV Push once  glucagon  Injectable 1 milliGRAM(s) IntraMuscular once PRN  glucagon  Injectable 1 milliGRAM(s) IntraMuscular once PRN  insulin lispro (HumaLOG) corrective regimen sliding scale   SubCutaneous three times a day with meals  insulin lispro (HumaLOG) corrective regimen sliding scale   SubCutaneous at bedtime  ondansetron Injectable 4 milliGRAM(s) IV Push every 6 hours PRN  pantoprazole    Tablet 40 milliGRAM(s) Oral before breakfast  sucralfate 1 Gram(s) Oral four times a day  dextrose 5%. 1000 milliLiter(s) IV Continuous <Continuous>  dextrose 5%. 1000 milliLiter(s) IV Continuous <Continuous>    Labs                        8.9    7.48  )-----------( 391      ( 16 Aug 2019 05:13 )             29.5   WBC from 9.79  H/h from 8.5/27.3    08-    143  |  107  |  14  ----------------------------<  186<H>  4.1   |  29  |  1.10    Ca    8.8      16 Aug 2019 05:13  Phos  4.1     08-  Mg     2.1     08-    TPro  7.0  /  Alb  3.0<L>  /  TBili  0.2  /  DBili  x   /  AST  17  /  ALT  16  /  AlkPhos  137<H>  08-14    PT/INR - ( 14 Aug 2019 21:17 )   PT: 11.9 sec;   INR: 1.05 ratio       PTT - ( 14 Aug 2019 21:17 )  PTT:32.2 sec  Urinalysis Basic - ( 15 Aug 2019 14:35 )    Color: Yellow / Appearance: Clear / S.010 / pH: x  Gluc: x / Ketone: Negative  / Bili: Negative / Urobili: Negative   Blood: x / Protein: 25 mg/dL / Nitrite: Negative   Leuk Esterase: Negative / RBC: 11-25 /HPF / WBC Negative   Sq Epi: x / Non Sq Epi: Occasional / Bacteria: Negative    Radiology: < from: MR Head No Cont (08.15.19 @ 17:01) >  FINDINGS:     No abnormal signal within the brain parenchyma.  There is no evidence of   acute infarct, hemorrhage or mass lesion. There is no midline shift or   herniation pattern. No mass effect is found in the brain.    The ventricles, sulci and basal cisterns appear unremarkable.    The vertebral and internal carotid arteries demonstrate expected flow   voids indicating their patency.    The paranasal sinuses are clear.    There is fluid in the left mastoid air cells.    IMPRESSION:   Normal MRI of the brain. No acute infarct. Small amount of   fluid in the left mastoid air cells.     < end of copied text >    Bedside Lung U/S: ***    Bedside Cardiac U/S: ***    CENTRAL LINE: Y/N   DATE INSERTED:   REMOVE: Y/N    RODRIGUEZ: Y/N      DATE INSERTED:        REMOVE: Y/N    A-LINE: Y/N     DATE INSERTED:              REMOVE: Y/N    GLOBAL ISSUE/BEST PRACTICE:  Analgesia:  Sedation:  HOB elevation: yes  Stress ulcer prophylaxis:  VTE prophylaxis:  Glycemic control:  Nutrition:    CODE STATUS: *** Interval events: MRI negative. Patient reports there has been no change in his R sided weakness and numbness. No other complaints. Denies headache, chest pain, shortness of breath, new weakness or change in sensation.    Review of Systems:*  Constitutional:  fatigue. no fever, chills,  Neuro:  numbness, weakness. no headache,  Resp: no cough, wheezing, shortness of breath  CVS: no chest pain, palpitations, leg swelling  GI: no abdominal pain, nausea, vomiting, diarrhea   : no dysuria  Skin: no itching, burning    T(F): 97.5 (19 @ 04:26), Max: 98.7 (19 @ 00:07)  HR: 67 (19 @ 07:00) (49 - 68)  BP: 137/64 (19 @ 07:00) (109/56 - 148/74)  RR: 15 (19 @ 07:00) (14 - 26)  SpO2: 94% (19 @ 07:00) (90% - 97%)  Wt(kg): --    CAPILLARY BLOOD GLUCOSE    POCT Blood Glucose.: 117 mg/dL (15 Aug 2019 21:37)    I&O's Summary    15 Aug 2019 07:01  -  16 Aug 2019 07:00  --------------------------------------------------------  IN: 820 mL / OUT: 2935 mL / NET: -2115 mL      Physical Exam:   Gen: Resting comfortably in no acute distresss  Neuro: AOx4. Speech wnl, remainder of exam deferred due to patient eating breakfast  HEENT: Normocephalic, atraumatic  CV: RRR, no m/r/g  Pulm: CTAB  GI: Soft, non-distended, non-tender  Ext: No edema  Skin: Warm, well-perfused    Meds:  apixaban 5 milliGRAM(s) Oral two times a day  clopidogrel Tablet 75 milliGRAM(s) Oral daily  tamsulosin 0.4 milliGRAM(s) Oral at bedtime  atorvastatin 80 milliGRAM(s) Oral at bedtime  dextrose 40% Gel 15 Gram(s) Oral once PRN  dextrose 50% Injectable 12.5 Gram(s) IV Push once  dextrose 50% Injectable 25 Gram(s) IV Push once  dextrose 50% Injectable 25 Gram(s) IV Push once  dextrose 50% Injectable 12.5 Gram(s) IV Push once  dextrose 50% Injectable 25 Gram(s) IV Push once  dextrose 50% Injectable 25 Gram(s) IV Push once  glucagon  Injectable 1 milliGRAM(s) IntraMuscular once PRN  glucagon  Injectable 1 milliGRAM(s) IntraMuscular once PRN  insulin lispro (HumaLOG) corrective regimen sliding scale   SubCutaneous three times a day with meals  insulin lispro (HumaLOG) corrective regimen sliding scale   SubCutaneous at bedtime  ondansetron Injectable 4 milliGRAM(s) IV Push every 6 hours PRN  pantoprazole    Tablet 40 milliGRAM(s) Oral before breakfast  sucralfate 1 Gram(s) Oral four times a day  dextrose 5%. 1000 milliLiter(s) IV Continuous <Continuous>  dextrose 5%. 1000 milliLiter(s) IV Continuous <Continuous>    Labs                        8.9    7.48  )-----------( 391      ( 16 Aug 2019 05:13 )             29.5   WBC from 9.79  H/h from 8.5/27.3    08-    143  |  107  |  14  ----------------------------<  186<H>  4.1   |  29  |  1.10    Ca    8.8      16 Aug 2019 05:13  Phos  4.1     08-16  Mg     2.1     08-16    TPro  7.0  /  Alb  3.0<L>  /  TBili  0.2  /  DBili  x   /  AST  17  /  ALT  16  /  AlkPhos  137<H>  08-14    PT/INR - ( 14 Aug 2019 21:17 )   PT: 11.9 sec;   INR: 1.05 ratio       PTT - ( 14 Aug 2019 21:17 )  PTT:32.2 sec  Urinalysis Basic - ( 15 Aug 2019 14:35 )    Color: Yellow / Appearance: Clear / S.010 / pH: x  Gluc: x / Ketone: Negative  / Bili: Negative / Urobili: Negative   Blood: x / Protein: 25 mg/dL / Nitrite: Negative   Leuk Esterase: Negative / RBC: 11-25 /HPF / WBC Negative   Sq Epi: x / Non Sq Epi: Occasional / Bacteria: Negative    Radiology: < from: MR Head No Cont (08.15.19 @ 17:01) >  FINDINGS:     No abnormal signal within the brain parenchyma.  There is no evidence of   acute infarct, hemorrhage or mass lesion. There is no midline shift or   herniation pattern. No mass effect is found in the brain.    The ventricles, sulci and basal cisterns appear unremarkable.    The vertebral and internal carotid arteries demonstrate expected flow   voids indicating their patency.    The paranasal sinuses are clear.    There is fluid in the left mastoid air cells.    IMPRESSION:   Normal MRI of the brain. No acute infarct. Small amount of   fluid in the left mastoid air cells.     < end of copied text >      CENTRAL LINE: Y    RODRIGUEZ: Y    A-LINE: N         GLOBAL ISSUE/BEST PRACTICE:  Analgesia: no  Sedation: no  HOB elevation: yes  Stress ulcer prophylaxis: yes  VTE prophylaxis: yes  Glycemic control: yes  Nutrition: yes    CODE STATUS: Full

## 2019-08-16 NOTE — OCCUPATIONAL THERAPY INITIAL EVALUATION ADULT - PERTINENT HX OF CURRENT PROBLEM, REHAB EVAL
pt is a 48 y/o male presented to the ED 8/14/19 after having slurred speech and Right sided weakness. Pt states around 6pm-6:30pm he was sitting at table with friends when started having slurred speech and RUE/RLE weakness, numbness and tingling.  Also felt confused and disoriented at times, associated with some blurry vision. Pt waited about 2 hours before presenting to ED for persistent symptoms. In the ED, received TPA for CVA.

## 2019-08-16 NOTE — OCCUPATIONAL THERAPY INITIAL EVALUATION ADULT - RANGE OF MOTION EXAMINATION, UPPER EXTREMITY
RUE AROM WFL throughout all joints except grossly 0-90 shoulder flex- pt reported RUE feeling "heavy and weak;" AAROM right shoulder WFL; WFL opposition x5 digits BUE; pt reported numbness right hand with diminished sensation to light touch; GMC grossly intact RUE; kinesthesia/proprioception grossly intact/Left UE Active ROM was WNL (within normal limits)

## 2019-08-16 NOTE — PROGRESS NOTE ADULT - ASSESSMENT
49 M with PAF on Eliquis, RUL subsegmental PE, hx of recent NSTEMI, perforated antral ulcer, osteomyelitis s/p debridement, HTN and DM.  He was recently admitted with chest pain and is s/p cath with mild LAD disease, 90% RCA and 100% RPL. He is now s/p BMS to RCA on 8/1/2019  He now presents with acute right sided weakness and slurred speech and is s/p tpa.    -no sxs of acute ischemia  - He is s/p BMS on 8/1/2019. He is now s/p 1 week course of triple therapy, and should remain on Plavix and full dose a/c.  - cont Plavix   - cont statin    - remains in sr on telemetry, with no additional AF.   - Restart Amiodarone with hold parameters  - BB had been held in the setting of bradycardia, and can continue to hold this  - Full dose a/c resumed     - Euvolemic on exam. Can hold po Lasix temporarily.     - Watch creatinine and electrolytes. Keep K>4, Mg>2  - Will follow with you.

## 2019-08-16 NOTE — PHYSICAL THERAPY INITIAL EVALUATION ADULT - PERTINENT HX OF CURRENT PROBLEM, REHAB EVAL
49M with PMH CAD s/p BMS to RCA, RUL subsegmental PE, hx of NSTEMI and PAF s/p ex-lap omentopexy, osteomyelitis s/p debridement,  HTN and DM2, presenting to the ED after having slurred speech and R sided weakness.   Patient was sitting at table with friends when started having slurred speech and R sided arm and LE weakness, numbness and tingling.  Also felt confused and disoriented at times, associated with some blurry vision.

## 2019-08-16 NOTE — PROGRESS NOTE ADULT - ATTENDING COMMENTS
Patient seen and examined, agree with above     48 y/o male with hx CAD, recent PCI (8/2/19), A.fib on apixaban (didn't take it for the past 10 days due to insurance issues), HTN, DM2 (on insulin), perforated antral ulcer s/p omentopexy and plication, left big toe OM s/p amputation, presented with slurred speech (resolved PTA) and R sided weakness, received tPA for acute CVA (likely L MCA).     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 - neurosurgery eval as outpatient (d/w neurology), start apixaban and Plavix at 10pm tonight (24hrs after tPA), continue statin, PT and swallow eval  CV - SBP 130s, hold Imdur to prevent hypotension, hold amiodarone as HR 40-50s, start apixaban, Plavix from tonight, continue statin, check ECHO  Resp - stable   GI - start po diet after swallow eval, aspiration precautions, continue PPI (once daily) and Carafate   Renal - stable   ID - no active issues   MSK - PT eval, fall precautions   PPx - apixaban     Patient critically ill, 40mins spent Patient seen and examined, agree with above     50 y/o male with hx CAD, recent PCI (8/2/19), A.fib on apixaban (didn't take it for the past 10 days due to insurance issues), HTN, DM2 (on insulin), perforated antral ulcer s/p omentopexy and plication, left big toe OM s/p amputation, presented with slurred speech (resolved PTA) and R sided weakness, received tPA for acute CVA (likely L MCA).     MRI brain w/o acute CVA   On exam - RUE and RLE remain weak (suspect intentional)     Recs:   Neuro - no CVA, symptoms likely psychogenic, no further w/u at this time, continue apixaban, Plavix and statin  CV - resume Imdur, amiodarone, continue apixaban, Plavix, statin  Resp - stable   GI - tolerating po diet, continue PPI, Carafate   Renal - stable   ID - no active issues   Endo - start Lantus 8 units qhs, Humalog 3 units premeal   MSK - PT eval, fall precautions   PPx - apixaban     Stable for tele

## 2019-08-16 NOTE — PROGRESS NOTE ADULT - ATTENDING COMMENTS
pt sitting in the chair complaing of continued right sided weakness--unchanged from the day of admission.  pt does admit to improvement in his capacity to speak.  pt has normal speech.  physical therapy evalyuation.  as per icu team.

## 2019-08-16 NOTE — PROGRESS NOTE ADULT - SUBJECTIVE AND OBJECTIVE BOX
[facility]    Bolivar Carbajal, El, Brittany, Zahra, Reynaldo, Román      [phone]    CHIEF COMPLAINT: Patient is a 49y old  Male who presents with a chief complaint of CVA (16 Aug 2019 07:28)      Follow Up: paf, cva, pe, recent nstemi s/p pci    Interim history: The patient reports no new symptoms.  Denies chest discomfort and shortness of breath.  No abdominal pain.  Persistent hemiparesis, no new neurologic symptoms.      MEDICATIONS  (STANDING):  apixaban 5 milliGRAM(s) Oral two times a day  atorvastatin 80 milliGRAM(s) Oral at bedtime  clopidogrel Tablet 75 milliGRAM(s) Oral daily  dextrose 5%. 1000 milliLiter(s) (50 mL/Hr) IV Continuous <Continuous>  dextrose 5%. 1000 milliLiter(s) (50 mL/Hr) IV Continuous <Continuous>  dextrose 50% Injectable 12.5 Gram(s) IV Push once  dextrose 50% Injectable 25 Gram(s) IV Push once  dextrose 50% Injectable 25 Gram(s) IV Push once  dextrose 50% Injectable 12.5 Gram(s) IV Push once  dextrose 50% Injectable 25 Gram(s) IV Push once  dextrose 50% Injectable 25 Gram(s) IV Push once  insulin lispro (HumaLOG) corrective regimen sliding scale   SubCutaneous three times a day with meals  insulin lispro (HumaLOG) corrective regimen sliding scale   SubCutaneous at bedtime  pantoprazole    Tablet 40 milliGRAM(s) Oral before breakfast  sucralfate 1 Gram(s) Oral four times a day  tamsulosin 0.4 milliGRAM(s) Oral at bedtime    MEDICATIONS  (PRN):  dextrose 40% Gel 15 Gram(s) Oral once PRN Blood Glucose LESS THAN 70 milliGRAM(s)/deciliter  glucagon  Injectable 1 milliGRAM(s) IntraMuscular once PRN Glucose LESS THAN 70 milligrams/deciliter  glucagon  Injectable 1 milliGRAM(s) IntraMuscular once PRN Glucose LESS THAN 70 milligrams/deciliter  ondansetron Injectable 4 milliGRAM(s) IV Push every 6 hours PRN Nausea and/or Vomiting      REVIEW OF SYSTEMS:  eye, ent, GI, , allergic, dermatologic, musculoskeletal and neurologic are negative except as described above    Vital Signs Last 24 Hrs  T(C): 36.4 (16 Aug 2019 07:39), Max: 37.1 (16 Aug 2019 00:07)  T(F): 97.6 (16 Aug 2019 07:39), Max: 98.7 (16 Aug 2019 00:07)  HR: 67 (16 Aug 2019 09:00) (49 - 68)  BP: 128/61 (16 Aug 2019 09:00) (109/56 - 148/74)  BP(mean): 87 (16 Aug 2019 09:00) (76 - 105)  RR: 24 (16 Aug 2019 09:00) (15 - 26)  SpO2: 92% (16 Aug 2019 09:00) (90% - 97%)    I&O's Summary    15 Aug 2019 07:01  -  16 Aug 2019 07:00  --------------------------------------------------------  IN: 820 mL / OUT: 2935 mL / NET: -2115 mL    16 Aug 2019 07:01  -  16 Aug 2019 09:52  --------------------------------------------------------  IN: 0 mL / OUT: 275 mL / NET: -275 mL        Telemetry past 24h: sr brief svt    PHYSICAL EXAM:    Constitutional: well-nourished, well-developed, NAD   HEENT:  MMM, sclerae anicteric, conjunctivae clear, no oral cyanosis.  Pulmonary: Non-labored, breath sounds are clear bilaterally, No wheezing, rales or rhonchi  Cardiovascular: Regular, S1 and S2.  No murmur.  No rubs, gallops or clicks  Gastrointestinal: Bowel Sounds present, soft, nontender.   Lymph: No peripheral edema.   Neurological: Alert, right hemiparesis  Skin: No rashes.  Psych:  Mood & affect appropriate    LABS: All Labs Reviewed:                        8.9    7.48  )-----------( 391      ( 16 Aug 2019 05:13 )             29.5                         8.5    9.79  )-----------( 378      ( 15 Aug 2019 06:55 )             27.3                         9.4    10.28 )-----------( 459      ( 14 Aug 2019 21:17 )             31.1     16 Aug 2019 05:13    143    |  107    |  14     ----------------------------<  186    4.1     |  29     |  1.10   15 Aug 2019 06:55    143    |  109    |  11     ----------------------------<  116    4.0     |  28     |  0.82   14 Aug 2019 21:17    141    |  104    |  15     ----------------------------<  296    4.4     |  27     |  1.10     Ca    8.8        16 Aug 2019 05:13  Ca    8.4        15 Aug 2019 06:55  Ca    9.0        14 Aug 2019 21:17  Phos  4.1       16 Aug 2019 05:13  Phos  3.8       15 Aug 2019 06:55  Mg     2.1       16 Aug 2019 05:13  Mg     2.0       15 Aug 2019 06:55    TPro  7.0    /  Alb  3.0    /  TBili  0.2    /  DBili  x      /  AST  17     /  ALT  16     /  AlkPhos  137    14 Aug 2019 21:17    PT/INR - ( 14 Aug 2019 21:17 )   PT: 11.9 sec;   INR: 1.05 ratio         PTT - ( 14 Aug 2019 21:17 )  PTT:32.2 sec      Blood Culture: Organism --  Gram Stain Blood -- Gram Stain --  Specimen Source .Urine Catheterized  Culture-Blood --        08-15 @ 06:55  TSH: 0.55      RADIOLOGY:    EKG:    Echo:  < from: TTE Echo Doppler w/o Cont (08.15.19 @ 16:33) >     EXAM:  ECHO TTE WO CON COMP W DOPPLR         PROCEDURE DATE:  08/15/2019        INTERPRETATION:  INDICATION: Atrial fibrillation    Blood Pressure 128/64    Height 187.9 cm     Weight 91 kg       BSA 2.18   sq m    Dimensions:    LA 3.7       Normal Values: 2.0 - 4.0 cm    Ao 3.6        Normal Values: 2.0 - 3.8 cm  SEPTUM 1.6       Normal Values: 0.6 - 1.2 cm  PWT 1.4       Normal Values: 0.6 - 1.1 cm  LVIDd 5.3         Normal Values: 3.0 - 5.6 cm  LVIDs 2.7         Normal Values: 1.8 - 4.0 cm      OBSERVATIONS:  Technically difficult study  Mitral Valve: normal, mild to moderate MR.  Aortic Valve/Aorta: Aortic valve is not well-visualized, likely normal   function.  Tricuspid Valve: normal with trace TR.  Pulmonic Valve: normal  Left Atrium: Enlarged  Right Atrium: normal  Left Ventricle: normal LV size and systolic function, estimated LVEF of   65%. Left ventricular hypertrophy  Right Ventricle: Grossly normal size and systolic function.  Pericardium/Pleura: normal, no significant pericardial effusion.  LV Diastolic Function: normal    Conclusion:   Technically difficult study  Normal left ventricular internal dimensions and systolic function,   estimated LVEF of 65%.   Grossly normal RV size and systolic function.   Left atrial enlargement  Aortic valve is not well-visualized, likely normal function.   Mild to moderate MR   Trace TR.    No significant pericardial effusion.                  MIROSLAVA CASTAÑEDA   This document has been electronically signed. Aug 16 2019  8:25AM               < end of copied text >      The patient is at risk of abrupt decompensation.  I have personally provided  35 minutes of critical care time, excluding time spent on separate procedures.

## 2019-08-16 NOTE — PROGRESS NOTE ADULT - ASSESSMENT
49M with PMHx of afib (no eliquis for last 10 days due to insurance lapse), CAD (s/p stent to RCA August 2019), NSTEMI, recent RUL subsegmental PE (June 2019), T2DM con insulin c/b diabetic neuropathy and OM s/p amputation of left great toe, HTN, and perforated antral ulcer s/p emergent ex-lap, omentopexy and plication (May 2019, Dr. Mejia), who presented to ED with acute onset slurred speech and right sided weakness. He is s/p TPA given 4 hours after the onset of symptoms (22:00 8/14) with interval improvement in speech. CTA revealed aneurysm at the bifurcation of R MCA.    Neuro: Q1 neuro checks. MRI negative. Repeat CT head today. Monitor for headache, worsening neuro status. Speech and swallow, PT/OT.  Neurology following.   CV: Apixaban for AC for atrial fibrillation, plavis for hx of stent. Lipid panel wnl. TTE pending. Keep -160. Holding home amiodarone and furosemide due to bradycardia, normotension, euvolemia. Restart home atorvastatin 80.  Keep K>4, Mg>2  Pulm: No active issues  GI: DASH diet with consistent carbohydrates. Home bowel regimen, protonix 40 BID, sucralfate. zofran prn for nausea.  Renal/lytes: Chronic jacques for urinary retention 2/2 BPH. Continue home tamsulosin. Monitor electrolytes.  ID: No active issues  Endo: HbA1c 8.3 8/1. TSH normal. Insulin sliding scale with accuchecks and hypoglycemia protocol.  Heme: Plavix, apixaban as above 49M with PMHx of afib (no eliquis for last 10 days due to insurance lapse), CAD (s/p stent to RCA August 2019), NSTEMI, recent RUL subsegmental PE (June 2019), T2DM con insulin c/b diabetic neuropathy and OM s/p amputation of left great toe, HTN, and perforated antral ulcer s/p emergent ex-lap, omentopexy and plication (May 2019, Dr. Mejia), who presented to ED with acute onset slurred speech and right sided weakness. He is s/p TPA given 4 hours after the onset of symptoms (22:00 8/14). Speech returned to baseline before TPA administration, and he has had no change in R sided weakness/numbness. CTA revealed aneurysm at the bifurcation of R MCA. MRI negative for stroke. Stable for transfer to telemetry.    Neuro: R sided hemiparesis and numbness w/o MRI evidence of stroke is unusual. Differential includes spinal process such as transverse myelitis or conversion disorder. S/p TPA. Monitor for headache, change in neuro status. Q4 neuro checks. PT/OT, OOB with assistance.  Neurology following.   CV: Apixaban for AC for atrial fibrillation, plavis for hx of stent. Lipid panel wnl. TTE pending. Keep -160. Holding home amiodarone and furosemide due to bradycardia, normotension, euvolemia. Restart home atorvastatin 80.  Keep K>4, Mg>2. [facility] to be given to patient at discharge.  Pulm: No active issues  GI: DASH diet with consistent carbohydrates. Home bowel regimen, protonix 40 BID, sucralfate. zofran prn for nausea.  Renal/lytes: Chronic jacques for urinary retention 2/2 BPH. Continue home tamsulosin. Monitor electrolytes.  ID: No active issues  Endo: HbA1c 8.3 8/1. TSH normal. Insulin sliding scale with accuchecks and hypoglycemia protocol. Start glargine 8 at bedtime, 3 lispro with meals.  Heme: Plavix, apixaban as above

## 2019-08-16 NOTE — OCCUPATIONAL THERAPY INITIAL EVALUATION ADULT - ADDITIONAL COMMENTS
Pt reported he lives with his friend in a private home with 3 steps to enter (+) rail. Bedroom on 2nd level (+) rail. Bathroom has a tub with grab bars and shower chair, (+) RTS. Pt reported PTA he was independent with ADLs, used RW for functional mobility. (+) . Pt is left hand dominant.

## 2019-08-16 NOTE — PROGRESS NOTE ADULT - SUBJECTIVE AND OBJECTIVE BOX
Neurology Follow up note    SARAH MORRISON49yMale    HPI:  49M with PMH CAD s/p BMS to RCA (2019), RUL subsegmental PE (2019, on Eliquis), hx of NSTEMI and PAF s/p ex-lap omentopexy and plication for perforated antral ulcer (2019), osteomyelitis s/p debridement,  HTN and DM2 (not on insulin) presenting to the ED after having slurred speech and R sided weakness.   Patient states around 6pm-6:30pm he was sitting at table with friends when started having slurred speech and R sided arm and LE weakness, numbness and tingling.  Also felt confused and disoriented at times, associated with some blurry vision.  Patient waited about 2 hours before presenting to ED for persistent symptoms.  In the ED, received TPA for CVA.  Patient states currently still feels numbness/ tingling (pins and needles) in RUE and RLE however blurry vision comes and goes s/p TPA in the ER. Denies chest pain, palpitations, headache, n/v, abd pain, urinary or bowel incontinence.  Denies lightheadedness/ dizziness, no fall or LOC.  Patient does admit to some SOB when symptoms started, now resolved.  Admits to significant weakness on R side not baseline.  Of note patient recently admitted to [facility] and transferred to [facility] on 2019 for chest pain, had cardiac cath done showed mild LAD disease, 90% RCA and 100% RPL, had BMS to RCA placed.  His baby asa was stopped but was started on plavix and Eliquis on discharge 19, since discharge patient has not had Eliquis because not covered by insurance (only was taking plavix).  Patient states he has family history of stroke on both sides of family, mother had some genetic coagulopathy (Factor V Leiden sounded familiar but not sure), however he was tested for genetic coagulopathies in 2011 and was negative.    In the ED, T 99.6F, HR 80, /74, RR 15, SpO2 97% on RA.  Labs significant for H/H 9.4/31.1 (baseline around 9.5-10.5), PT/INR 11.9/1.05, glucose 296.  Received TPA in the ED.    EKG: NSR  Noncontrast head CT: Stable exam. No acute intracranial hemorrhage or mass effect vasogenic edema.  CTA neck: No major vessel occlusion, hemodynamically significant stenosis according   to the NASCET criteria or dissection.  CTA head: No major vessel occlusion or hemodynamically significant stenosis. 3.7 x   4.1 x 5.4 mm lobulated right MCA bifurcation aneurysm. (14 Aug 2019 23:03)      Interval History - no new weakness    Patient is seen, chart was reviewed and case was discussed with the treatment team.  Pt is not in any distress.   Lying on bed comfortably.   No events reported overnight.   No clinical seizure was reported.  Sitting on chair bed comfortably.    is at bedside.    Vital Signs Last 24 Hrs  T(C): 36.9 (16 Aug 2019 11:32), Max: 37.1 (16 Aug 2019 00:07)  T(F): 98.5 (16 Aug 2019 11:32), Max: 98.7 (16 Aug 2019 00:07)  HR: 70 (16 Aug 2019 10:00) (50 - 70)  BP: 132/63 (16 Aug 2019 10:00) (109/58 - 148/74)  BP(mean): 90 (16 Aug 2019 10:00) (78 - 105)  RR: 22 (16 Aug 2019 10:00) (15 - 26)  SpO2: 92% (16 Aug 2019 10:00) (90% - 97%)        REVIEW OF SYSTEMS:    Constitutional: No fever, weight loss or fatigue  Eyes: No eye pain, visual disturbances, or discharge  ENT:  No difficulty hearing, tinnitus, vertigo; No sinus or throat pain  Neck: No pain or stiffness  Respiratory: No cough, wheezing, chills or hemoptysis  Cardiovascular: No chest pain, palpitations, shortness of breath, d  Gastrointestinal: No abdominal or epigastric pain. No nausea, vomiting or hematemesis; N  Genitourinary: No dysuria, frequency, hematuria or incontinence  Neurological: No headaches, memory loss, loss   Psychiatric: No depression, anxiety, mood swings or difficulty sleeping  Musculoskeletal: No joint pain or swelling; No muscle, back or extremity pain  Skin: No itching, burning, rashes or lesions   Lymph Nodes: No enlarged glands  Endocrine: No heat or cold intolerance;  Allergy and Immunologic: No hives or eczema    On Neurological Examination:    Mental Status - Pt is alert, awake, oriented X3 Follows commands well and able to answer questions appropriately    Speech -  Normal.    Cranial Nerves - Pupils 3 mm equal and reactive to light, extraocular eye movements intact. Pt has no visual field deficit.  Pt has no  facial asymmetry. Facial sensation is intact    Muscle tone - is normal    Motor Exam - right hemiparesis    Sensory Exam -  Pt withdraws all extremities equally on stimulation. No asymmetry seen.        coordination:    Finger to nose: normal    Deep tendon Reflexes - 2 plus all over.          Neck Supple -  Yes.     MEDICATIONS    apixaban 5 milliGRAM(s) Oral two times a day  atorvastatin 80 milliGRAM(s) Oral at bedtime  clopidogrel Tablet 75 milliGRAM(s) Oral daily  dextrose 40% Gel 15 Gram(s) Oral once PRN  dextrose 5%. 1000 milliLiter(s) IV Continuous <Continuous>  dextrose 5%. 1000 milliLiter(s) IV Continuous <Continuous>  dextrose 50% Injectable 12.5 Gram(s) IV Push once  dextrose 50% Injectable 25 Gram(s) IV Push once  dextrose 50% Injectable 25 Gram(s) IV Push once  dextrose 50% Injectable 12.5 Gram(s) IV Push once  dextrose 50% Injectable 25 Gram(s) IV Push once  dextrose 50% Injectable 25 Gram(s) IV Push once  glucagon  Injectable 1 milliGRAM(s) IntraMuscular once PRN  glucagon  Injectable 1 milliGRAM(s) IntraMuscular once PRN  insulin glargine Injectable (LANTUS) 8 Unit(s) SubCutaneous at bedtime  insulin lispro (HumaLOG) corrective regimen sliding scale   SubCutaneous three times a day with meals  insulin lispro (HumaLOG) corrective regimen sliding scale   SubCutaneous at bedtime  insulin lispro Injectable (HumaLOG) 3 Unit(s) SubCutaneous three times a day with meals  ondansetron Injectable 4 milliGRAM(s) IV Push every 6 hours PRN  pantoprazole    Tablet 40 milliGRAM(s) Oral before breakfast  sucralfate 1 Gram(s) Oral four times a day  tamsulosin 0.4 milliGRAM(s) Oral at bedtime      Allergies    fish (Hives)  No Known Drug Allergies    Intolerances        LABS:  CBC Full  -  ( 16 Aug 2019 05:13 )  WBC Count : 7.48 K/uL  RBC Count : 3.36 M/uL  Hemoglobin : 8.9 g/dL  Hematocrit : 29.5 %  Platelet Count - Automated : 391 K/uL  Mean Cell Volume : 87.8 fl  Mean Cell Hemoglobin : 26.5 pg  Mean Cell Hemoglobin Concentration : 30.2 gm/dL  Auto Neutrophil # : 4.30 K/uL      Urinalysis Basic - ( 15 Aug 2019 14:35 )    Color: Yellow / Appearance: Clear / S.010 / pH: x  Gluc: x / Ketone: Negative  / Bili: Negative / Urobili: Negative   Blood: x / Protein: 25 mg/dL / Nitrite: Negative   Leuk Esterase: Negative / RBC: 11-25 /HPF / WBC Negative   Sq Epi: x / Non Sq Epi: Occasional / Bacteria: Negative          143  |  107  |  14  ----------------------------<  186<H>  4.1   |  29  |  1.10    Ca    8.8      16 Aug 2019 05:13  Phos  4.1     08-  Mg     2.1     -16    TPro  7.0  /  Alb  3.0<L>  /  TBili  0.2  /  DBili  x   /  AST  17  /  ALT  16  /  AlkPhos  137<H>      Hemoglobin A1C:     Vitamin B12     RADIOLOGY    ASSESSMENT AND PLAN:      presented with right sided weakness/slurred speech cw tia sp tpa.    brain mri revealed no acute cva.  Physical therapy evaluation.  OOB to chair/ambulation with assistance only.  Advanced care planning was discussed with family.  Pain is accessed and addressed.  Would continue to follow.

## 2019-08-17 LAB
-  AMPICILLIN: SIGNIFICANT CHANGE UP
-  CIPROFLOXACIN: SIGNIFICANT CHANGE UP
-  LEVOFLOXACIN: SIGNIFICANT CHANGE UP
-  NITROFURANTOIN: SIGNIFICANT CHANGE UP
-  TETRACYCLINE: SIGNIFICANT CHANGE UP
-  VANCOMYCIN: SIGNIFICANT CHANGE UP
ANION GAP SERPL CALC-SCNC: 8 MMOL/L — SIGNIFICANT CHANGE UP (ref 5–17)
BASOPHILS # BLD AUTO: 0.03 K/UL — SIGNIFICANT CHANGE UP (ref 0–0.2)
BASOPHILS NFR BLD AUTO: 0.4 % — SIGNIFICANT CHANGE UP (ref 0–2)
BUN SERPL-MCNC: 13 MG/DL — SIGNIFICANT CHANGE UP (ref 7–23)
CALCIUM SERPL-MCNC: 9 MG/DL — SIGNIFICANT CHANGE UP (ref 8.5–10.1)
CHLORIDE SERPL-SCNC: 107 MMOL/L — SIGNIFICANT CHANGE UP (ref 96–108)
CO2 SERPL-SCNC: 30 MMOL/L — SIGNIFICANT CHANGE UP (ref 22–31)
CREAT SERPL-MCNC: 0.9 MG/DL — SIGNIFICANT CHANGE UP (ref 0.5–1.3)
EOSINOPHIL # BLD AUTO: 0.41 K/UL — SIGNIFICANT CHANGE UP (ref 0–0.5)
EOSINOPHIL NFR BLD AUTO: 5.4 % — SIGNIFICANT CHANGE UP (ref 0–6)
GLUCOSE SERPL-MCNC: 153 MG/DL — HIGH (ref 70–99)
HCT VFR BLD CALC: 27.1 % — LOW (ref 39–50)
HGB BLD-MCNC: 8.4 G/DL — LOW (ref 13–17)
IMM GRANULOCYTES NFR BLD AUTO: 0.3 % — SIGNIFICANT CHANGE UP (ref 0–1.5)
LYMPHOCYTES # BLD AUTO: 2.04 K/UL — SIGNIFICANT CHANGE UP (ref 1–3.3)
LYMPHOCYTES # BLD AUTO: 27 % — SIGNIFICANT CHANGE UP (ref 13–44)
MAGNESIUM SERPL-MCNC: 2.1 MG/DL — SIGNIFICANT CHANGE UP (ref 1.6–2.6)
MCHC RBC-ENTMCNC: 26.7 PG — LOW (ref 27–34)
MCHC RBC-ENTMCNC: 31 GM/DL — LOW (ref 32–36)
MCV RBC AUTO: 86 FL — SIGNIFICANT CHANGE UP (ref 80–100)
METHOD TYPE: SIGNIFICANT CHANGE UP
MONOCYTES # BLD AUTO: 0.78 K/UL — SIGNIFICANT CHANGE UP (ref 0–0.9)
MONOCYTES NFR BLD AUTO: 10.3 % — SIGNIFICANT CHANGE UP (ref 2–14)
NEUTROPHILS # BLD AUTO: 4.27 K/UL — SIGNIFICANT CHANGE UP (ref 1.8–7.4)
NEUTROPHILS NFR BLD AUTO: 56.6 % — SIGNIFICANT CHANGE UP (ref 43–77)
NRBC # BLD: 0 /100 WBCS — SIGNIFICANT CHANGE UP (ref 0–0)
PHOSPHATE SERPL-MCNC: 4.3 MG/DL — SIGNIFICANT CHANGE UP (ref 2.5–4.5)
PLATELET # BLD AUTO: 354 K/UL — SIGNIFICANT CHANGE UP (ref 150–400)
POTASSIUM SERPL-MCNC: 3.8 MMOL/L — SIGNIFICANT CHANGE UP (ref 3.5–5.3)
POTASSIUM SERPL-SCNC: 3.8 MMOL/L — SIGNIFICANT CHANGE UP (ref 3.5–5.3)
RBC # BLD: 3.15 M/UL — LOW (ref 4.2–5.8)
RBC # FLD: 13.8 % — SIGNIFICANT CHANGE UP (ref 10.3–14.5)
SODIUM SERPL-SCNC: 145 MMOL/L — SIGNIFICANT CHANGE UP (ref 135–145)
WBC # BLD: 7.55 K/UL — SIGNIFICANT CHANGE UP (ref 3.8–10.5)
WBC # FLD AUTO: 7.55 K/UL — SIGNIFICANT CHANGE UP (ref 3.8–10.5)

## 2019-08-17 PROCEDURE — 99232 SBSQ HOSP IP/OBS MODERATE 35: CPT

## 2019-08-17 PROCEDURE — 99233 SBSQ HOSP IP/OBS HIGH 50: CPT | Mod: GC

## 2019-08-17 RX ORDER — AMIODARONE HYDROCHLORIDE 400 MG/1
100 TABLET ORAL DAILY
Refills: 0 | Status: DISCONTINUED | OUTPATIENT
Start: 2019-08-18 | End: 2019-08-21

## 2019-08-17 RX ORDER — ACETAMINOPHEN 500 MG
650 TABLET ORAL EVERY 6 HOURS
Refills: 0 | Status: DISCONTINUED | OUTPATIENT
Start: 2019-08-17 | End: 2019-08-21

## 2019-08-17 RX ORDER — POTASSIUM CHLORIDE 20 MEQ
40 PACKET (EA) ORAL ONCE
Refills: 0 | Status: COMPLETED | OUTPATIENT
Start: 2019-08-17 | End: 2019-08-17

## 2019-08-17 RX ADMIN — Medication 3 UNIT(S): at 08:00

## 2019-08-17 RX ADMIN — Medication 1 GRAM(S): at 00:12

## 2019-08-17 RX ADMIN — Medication 1 GRAM(S): at 17:24

## 2019-08-17 RX ADMIN — Medication 3 UNIT(S): at 11:44

## 2019-08-17 RX ADMIN — Medication 3 UNIT(S): at 17:19

## 2019-08-17 RX ADMIN — Medication 1000 MILLIGRAM(S): at 02:52

## 2019-08-17 RX ADMIN — Medication 1 GRAM(S): at 11:45

## 2019-08-17 RX ADMIN — Medication 1 GRAM(S): at 23:57

## 2019-08-17 RX ADMIN — Medication 2: at 07:59

## 2019-08-17 RX ADMIN — ATORVASTATIN CALCIUM 80 MILLIGRAM(S): 80 TABLET, FILM COATED ORAL at 21:37

## 2019-08-17 RX ADMIN — Medication 650 MILLIGRAM(S): at 00:54

## 2019-08-17 RX ADMIN — Medication 2: at 11:44

## 2019-08-17 RX ADMIN — APIXABAN 5 MILLIGRAM(S): 2.5 TABLET, FILM COATED ORAL at 09:31

## 2019-08-17 RX ADMIN — Medication 1 GRAM(S): at 05:16

## 2019-08-17 RX ADMIN — AMIODARONE HYDROCHLORIDE 100 MILLIGRAM(S): 400 TABLET ORAL at 05:16

## 2019-08-17 RX ADMIN — PANTOPRAZOLE SODIUM 40 MILLIGRAM(S): 20 TABLET, DELAYED RELEASE ORAL at 05:16

## 2019-08-17 RX ADMIN — TAMSULOSIN HYDROCHLORIDE 0.4 MILLIGRAM(S): 0.4 CAPSULE ORAL at 21:37

## 2019-08-17 RX ADMIN — Medication 2: at 17:19

## 2019-08-17 RX ADMIN — INSULIN GLARGINE 8 UNIT(S): 100 INJECTION, SOLUTION SUBCUTANEOUS at 21:49

## 2019-08-17 RX ADMIN — APIXABAN 5 MILLIGRAM(S): 2.5 TABLET, FILM COATED ORAL at 21:37

## 2019-08-17 RX ADMIN — Medication 650 MILLIGRAM(S): at 00:18

## 2019-08-17 RX ADMIN — CLOPIDOGREL BISULFATE 75 MILLIGRAM(S): 75 TABLET, FILM COATED ORAL at 21:37

## 2019-08-17 RX ADMIN — Medication 40 MILLIEQUIVALENT(S): at 07:59

## 2019-08-17 NOTE — PROGRESS NOTE ADULT - SUBJECTIVE AND OBJECTIVE BOX
Neurology Follow up note    SARAH MORRISON49yMale    HPI:  49M with PMH CAD s/p BMS to RCA (2019), RUL subsegmental PE (2019, on Eliquis), hx of NSTEMI and PAF s/p ex-lap omentopexy and plication for perforated antral ulcer (2019), osteomyelitis s/p debridement,  HTN and DM2 (not on insulin) presenting to the ED after having slurred speech and R sided weakness.   Patient states around 6pm-6:30pm he was sitting at table with friends when started having slurred speech and R sided arm and LE weakness, numbness and tingling.  Also felt confused and disoriented at times, associated with some blurry vision.  Patient waited about 2 hours before presenting to ED for persistent symptoms.  In the ED, received TPA for CVA.  Patient states currently still feels numbness/ tingling (pins and needles) in RUE and RLE however blurry vision comes and goes s/p TPA in the ER. Denies chest pain, palpitations, headache, n/v, abd pain, urinary or bowel incontinence.  Denies lightheadedness/ dizziness, no fall or LOC.  Patient does admit to some SOB when symptoms started, now resolved.  Admits to significant weakness on R side not baseline.  Of note patient recently admitted to [facility] and transferred to [facility] on 2019 for chest pain, had cardiac cath done showed mild LAD disease, 90% RCA and 100% RPL, had BMS to RCA placed.  His baby asa was stopped but was started on plavix and Eliquis on discharge 19, since discharge patient has not had Eliquis because not covered by insurance (only was taking plavix).  Patient states he has family history of stroke on both sides of family, mother had some genetic coagulopathy (Factor V Leiden sounded familiar but not sure), however he was tested for genetic coagulopathies in 2011 and was negative.    In the ED, T 99.6F, HR 80, /74, RR 15, SpO2 97% on RA.  Labs significant for H/H 9.4/31.1 (baseline around 9.5-10.5), PT/INR 11.9/1.05, glucose 296.  Received TPA in the ED.    EKG: NSR  Noncontrast head CT: Stable exam. No acute intracranial hemorrhage or mass effect vasogenic edema.  CTA neck: No major vessel occlusion, hemodynamically significant stenosis according   to the NASCET criteria or dissection.  CTA head: No major vessel occlusion or hemodynamically significant stenosis. 3.7 x   4.1 x 5.4 mm lobulated right MCA bifurcation aneurysm. (14 Aug 2019 23:03)      Interval History -my right side is still weak    Patient is seen, chart was reviewed and case was discussed with the treatment team.  Pt is not in any distress.   Lying on bed comfortably.   No events reported overnight.   No clinical seizure was reported.  Sitting on chair bed comfortably.    is at bedside.    Vital Signs Last 24 Hrs  T(C): 36.8 (17 Aug 2019 15:32), Max: 36.9 (17 Aug 2019 00:15)  T(F): 98.2 (17 Aug 2019 15:32), Max: 98.4 (17 Aug 2019 00:15)  HR: 55 (17 Aug 2019 15:00) (45 - 72)  BP: 135/70 (17 Aug 2019 15:00) (98/54 - 151/73)  BP(mean): 93 (17 Aug 2019 15:00) (73 - 105)  RR: 22 (17 Aug 2019 15:00) (17 - 25)  SpO2: 98% (17 Aug 2019 15:00) (92% - 99%)          REVIEW OF SYSTEMS:    Constitutional: No fever, weight loss or fatigue  Eyes: No eye pain, visual disturbances, or discharge  ENT:  No difficulty hearing, tinnitus, vertigo; No sinus or throat pain  Neck: No pain or stiffness  Respiratory: No cough, wheezing, chills or hemoptysis  Cardiovascular: No chest pain, palpitations, shortness of breath, d  Gastrointestinal: No abdominal or epigastric pain. No nausea, vomiting or hematemesis; N  Genitourinary: No dysuria, frequency, hematuria or incontinence  Neurological: No headaches, memory loss, loss   Psychiatric: No depression, anxiety, mood swings or difficulty sleeping  Musculoskeletal: No joint pain or swelling; No muscle, back or extremity pain  Skin: No itching, burning, rashes or lesions   Lymph Nodes: No enlarged glands  Endocrine: No heat or cold intolerance;  Allergy and Immunologic: No hives or eczema    On Neurological Examination:    Mental Status - Pt is alert, awake, oriented X3 Follows commands well and able to answer questions appropriately    Speech -  Normal.    Cranial Nerves - Pupils 3 mm equal and reactive to light, extraocular eye movements intact. Pt has no visual field deficit.  Pt has no  facial asymmetry. Facial sensation is intact    Muscle tone - is normal    Motor Exam - right hemiparesis; 4/5    Sensory Exam -  Pt withdraws all extremities equally on stimulation. No asymmetry seen.        coordination:    Finger to nose: normal    Deep tendon Reflexes - 2 plus all over.          Neck Supple -  Yes.     MEDICATIONS    apixaban 5 milliGRAM(s) Oral two times a day  atorvastatin 80 milliGRAM(s) Oral at bedtime  clopidogrel Tablet 75 milliGRAM(s) Oral daily  dextrose 40% Gel 15 Gram(s) Oral once PRN  dextrose 5%. 1000 milliLiter(s) IV Continuous <Continuous>  dextrose 5%. 1000 milliLiter(s) IV Continuous <Continuous>  dextrose 50% Injectable 12.5 Gram(s) IV Push once  dextrose 50% Injectable 25 Gram(s) IV Push once  dextrose 50% Injectable 25 Gram(s) IV Push once  dextrose 50% Injectable 12.5 Gram(s) IV Push once  dextrose 50% Injectable 25 Gram(s) IV Push once  dextrose 50% Injectable 25 Gram(s) IV Push once  glucagon  Injectable 1 milliGRAM(s) IntraMuscular once PRN  glucagon  Injectable 1 milliGRAM(s) IntraMuscular once PRN  insulin glargine Injectable (LANTUS) 8 Unit(s) SubCutaneous at bedtime  insulin lispro (HumaLOG) corrective regimen sliding scale   SubCutaneous three times a day with meals  insulin lispro (HumaLOG) corrective regimen sliding scale   SubCutaneous at bedtime  insulin lispro Injectable (HumaLOG) 3 Unit(s) SubCutaneous three times a day with meals  ondansetron Injectable 4 milliGRAM(s) IV Push every 6 hours PRN  pantoprazole    Tablet 40 milliGRAM(s) Oral before breakfast  sucralfate 1 Gram(s) Oral four times a day  tamsulosin 0.4 milliGRAM(s) Oral at bedtime      Allergies    fish (Hives)  No Known Drug Allergies    Intolerances        LABS:  CBC Full  -  ( 16 Aug 2019 05:13 )  WBC Count : 7.48 K/uL  RBC Count : 3.36 M/uL  Hemoglobin : 8.9 g/dL  Hematocrit : 29.5 %  Platelet Count - Automated : 391 K/uL  Mean Cell Volume : 87.8 fl  Mean Cell Hemoglobin : 26.5 pg  Mean Cell Hemoglobin Concentration : 30.2 gm/dL  Auto Neutrophil # : 4.30 K/uL      Urinalysis Basic - ( 15 Aug 2019 14:35 )    Color: Yellow / Appearance: Clear / S.010 / pH: x  Gluc: x / Ketone: Negative  / Bili: Negative / Urobili: Negative   Blood: x / Protein: 25 mg/dL / Nitrite: Negative   Leuk Esterase: Negative / RBC: 11-25 /HPF / WBC Negative   Sq Epi: x / Non Sq Epi: Occasional / Bacteria: Negative          143  |  107  |  14  ----------------------------<  186<H>  4.1   |  29  |  1.10    Ca    8.8      16 Aug 2019 05:13  Phos  4.1     -  Mg     2.1     -    TPro  7.0  /  Alb  3.0<L>  /  TBili  0.2  /  DBili  x   /  AST  17  /  ALT  16  /  AlkPhos  137<H>      Hemoglobin A1C:     Vitamin B12     RADIOLOGY    ASSESSMENT AND PLAN:      presented with right sided weakness/slurred speech cw tia sp tpa.    already started on ac.  need rehab  Physical therapy evaluation.  OOB to chair/ambulation with assistance only.  Advanced care planning was discussed with family.  Pain is accessed and addressed.  Would continue to follow.

## 2019-08-17 NOTE — PROGRESS NOTE ADULT - SUBJECTIVE AND OBJECTIVE BOX
Patient is a 49y old  Male who presents with a chief complaint of CVA (16 Aug 2019 13:52)    24 hour events: remains in sinus rhythm with HR 40s when he is sleeping   continues to have R sided weakness   denies CP, SOB, headache, dizziness, lightheadedness     REVIEW OF SYSTEMS  Constitutional: No fever, chills, fatigue  Neuro: +right sided weakness, No headache  Resp: No cough, wheezing, shortness of breath  CVS: No chest pain, palpitations, leg swelling  GI: No abdominal pain, nausea, vomiting, diarrhea   : No dysuria, frequency, incontinence  Skin: No itching, burning, rashes, or lesions   Msk: No joint pain or swelling  Psych: No depression, anxiety, mood swings  Heme: No bleeding    T(F): 97.7 (19 @ 07:48), Max: 98.5 (19 @ 11:32)  HR: 45 (19 @ 08:00) (45 - 72)  BP: 109/53 (19 @ 08:00) (98/54 - 152/97)  RR: 22 (19 @ 08:00) (17 - 25)  SpO2: 98% (19 @ 08:00) (92% - 99%)    CAPILLARY BLOOD GLUCOSE  POCT Blood Glucose.: 153 mg/dL (17 Aug 2019 07:33)  POCT Blood Glucose.: 234 mg/dL (16 Aug 2019 21:28)  POCT Blood Glucose.: 212 mg/dL (16 Aug 2019 17:02)  POCT Blood Glucose.: 267 mg/dL (16 Aug 2019 12:06)  POCT Blood Glucose.: 181 mg/dL (16 Aug 2019 08:35)    I&O's Summary     @ 07:  -   @ 07:00  --------------------------------------------------------  IN: 680 mL / OUT: 3250 mL / NET: -2570 mL     @ 07:01  -   @ 08:16  --------------------------------------------------------  IN: 100 mL / OUT: 150 mL / NET: -50 mL    PHYSICAL EXAM  General: NAD  CNS: AAOx3, right side remains weak however strength appears to be improving c/w yesterday   HEENT: pupils reactive b/l   Resp: clear   CVS: S1S2, regular, bradycardic   Abd: soft, NT, +BS  Ext: no edema   Skin: warm     MEDICATIONS  isosorbide   mononitrate ER Tablet (IMDUR) Oral  tamsulosin Oral  atorvastatin Oral  insulin glargine Injectable (LANTUS) SubCutaneous  insulin lispro (HumaLOG) corrective regimen sliding scale SubCutaneous  insulin lispro Injectable (HumaLOG) SubCutaneous  acetaminophen   Tablet .. Oral PRN  ondansetron Injectable IV Push PRN  apixaban Oral  clopidogrel Tablet Oral  pantoprazole    Tablet Oral  sucralfate Oral                        8.4    7.55  )-----------( 354      ( 17 Aug 2019 05:54 )             27.1     -    145  |  107  |  13  ----------------------------<  153<H>  3.8   |  30  |  0.90    Ca    9.0      17 Aug 2019 05:54  Phos  4.3       Mg     2.1         Urinalysis Basic - ( 15 Aug 2019 14:35 )    Color: Yellow / Appearance: Clear / S.010 / pH: x  Gluc: x / Ketone: Negative  / Bili: Negative / Urobili: Negative   Blood: x / Protein: 25 mg/dL / Nitrite: Negative   Leuk Esterase: Negative / RBC: 11-25 /HPF / WBC Negative   Sq Epi: x / Non Sq Epi: Occasional / Bacteria: Negative    .Urine Catheterized   >100,000 CFU/ml Gram Negative Rods  >100,000 CFU/ml Gram positive organisms -- 08-15 @ 11:08    CENTRAL LINE: N          RODRIGUEZ: Y                         REMOVE: N (chronic)  A-LINE: N                      GLOBAL ISSUE/BEST PRACTICE  Analgesia: NA  Sedation: NA  CAM-ICU: neg  HOB elevation: yes  Stress ulcer prophylaxis: NA  VTE prophylaxis: Y  Glycemic control: Y  Nutrition: Y    CODE STATUS: full  GOC discussion: Y

## 2019-08-17 NOTE — PROGRESS NOTE ADULT - SUBJECTIVE AND OBJECTIVE BOX
[facility]    Bolivar Carbajal, El, Brittany, Zahra, Reynaldo, Román      [phone]    CHIEF COMPLAINT: Patient is a 49y old  Male who presents with a chief complaint of CVA (16 Aug 2019 13:52)      Follow Up: paf, pe, nstemi/pci, cva    Interim history: The patient reports no new symptoms.  Denies chest discomfort and shortness of breath.  No abdominal pain.  Unchanged neurologic symptoms.      MEDICATIONS  (STANDING):  apixaban 5 milliGRAM(s) Oral two times a day  atorvastatin 80 milliGRAM(s) Oral at bedtime  clopidogrel Tablet 75 milliGRAM(s) Oral daily  dextrose 5%. 1000 milliLiter(s) (50 mL/Hr) IV Continuous <Continuous>  dextrose 5%. 1000 milliLiter(s) (50 mL/Hr) IV Continuous <Continuous>  dextrose 50% Injectable 12.5 Gram(s) IV Push once  dextrose 50% Injectable 25 Gram(s) IV Push once  dextrose 50% Injectable 25 Gram(s) IV Push once  dextrose 50% Injectable 12.5 Gram(s) IV Push once  dextrose 50% Injectable 25 Gram(s) IV Push once  dextrose 50% Injectable 25 Gram(s) IV Push once  insulin glargine Injectable (LANTUS) 8 Unit(s) SubCutaneous at bedtime  insulin lispro (HumaLOG) corrective regimen sliding scale   SubCutaneous Before meals and at bedtime  insulin lispro Injectable (HumaLOG) 3 Unit(s) SubCutaneous three times a day with meals  isosorbide   mononitrate ER Tablet (IMDUR) 30 milliGRAM(s) Oral daily  pantoprazole    Tablet 40 milliGRAM(s) Oral before breakfast  sucralfate 1 Gram(s) Oral four times a day  tamsulosin 0.4 milliGRAM(s) Oral at bedtime    MEDICATIONS  (PRN):  acetaminophen   Tablet .. 650 milliGRAM(s) Oral every 6 hours PRN Mild Pain (1 - 3)  dextrose 40% Gel 15 Gram(s) Oral once PRN Blood Glucose LESS THAN 70 milliGRAM(s)/deciliter  glucagon  Injectable 1 milliGRAM(s) IntraMuscular once PRN Glucose LESS THAN 70 milligrams/deciliter  glucagon  Injectable 1 milliGRAM(s) IntraMuscular once PRN Glucose LESS THAN 70 milligrams/deciliter  ondansetron Injectable 4 milliGRAM(s) IV Push every 6 hours PRN Nausea and/or Vomiting      REVIEW OF SYSTEMS:  eye, ent, GI, , allergic, dermatologic, musculoskeletal and neurologic are negative except as described above    Vital Signs Last 24 Hrs  T(C): 36.5 (17 Aug 2019 07:48), Max: 36.9 (16 Aug 2019 11:32)  T(F): 97.7 (17 Aug 2019 07:48), Max: 98.5 (16 Aug 2019 11:32)  HR: 61 (17 Aug 2019 10:00) (45 - 72)  BP: 107/58 (17 Aug 2019 10:00) (98/54 - 152/97)  BP(mean): 77 (17 Aug 2019 10:00) (73 - 119)  RR: 20 (17 Aug 2019 10:00) (17 - 25)  SpO2: 92% (17 Aug 2019 10:00) (92% - 99%)    I&O's Summary    16 Aug 2019 07:01  -  17 Aug 2019 07:00  --------------------------------------------------------  IN: 680 mL / OUT: 3250 mL / NET: -2570 mL    17 Aug 2019 07:01  -  17 Aug 2019 10:31  --------------------------------------------------------  IN: 340 mL / OUT: 350 mL / NET: -10 mL        Telemetry past 24h:    PHYSICAL EXAM:    Constitutional: well-nourished, well-developed, NAD   HEENT:  MMM, sclerae anicteric, conjunctivae clear, no oral cyanosis.  Pulmonary: Non-labored, breath sounds are clear bilaterally, No wheezing, rales or rhonchi  Cardiovascular: Regular, S1 and S2.  No murmur.  No rubs, gallops or clicks  Gastrointestinal: Bowel Sounds present, soft, nontender.   Lymph: No peripheral edema.   Neurological: Alert, hemiparesis no change  Skin: No rashes.  Psych:  Mood & affect appropriate    LABS: All Labs Reviewed:                        8.4    7.55  )-----------( 354      ( 17 Aug 2019 05:54 )             27.1                         8.9    7.48  )-----------( 391      ( 16 Aug 2019 05:13 )             29.5                         8.5    9.79  )-----------( 378      ( 15 Aug 2019 06:55 )             27.3     17 Aug 2019 05:54    145    |  107    |  13     ----------------------------<  153    3.8     |  30     |  0.90   16 Aug 2019 05:13    143    |  107    |  14     ----------------------------<  186    4.1     |  29     |  1.10   15 Aug 2019 06:55    143    |  109    |  11     ----------------------------<  116    4.0     |  28     |  0.82     Ca    9.0        17 Aug 2019 05:54  Ca    8.8        16 Aug 2019 05:13  Ca    8.4        15 Aug 2019 06:55  Phos  4.3       17 Aug 2019 05:54  Phos  4.1       16 Aug 2019 05:13  Phos  3.8       15 Aug 2019 06:55  Mg     2.1       17 Aug 2019 05:54  Mg     2.1       16 Aug 2019 05:13  Mg     2.0       15 Aug 2019 06:55    TPro  7.0    /  Alb  3.0    /  TBili  0.2    /  DBili  x      /  AST  17     /  ALT  16     /  AlkPhos  137    14 Aug 2019 21:17          Blood Culture: Organism --  Gram Stain Blood -- Gram Stain --  Specimen Source .Urine Catheterized  Culture-Blood --        08-15 @ 06:55  TSH: 0.55      RADIOLOGY:    EKG:    Echo:

## 2019-08-17 NOTE — PROGRESS NOTE ADULT - ASSESSMENT
49 M with PAF on Eliquis, RUL subsegmental PE, hx of recent NSTEMI, perforated antral ulcer, osteomyelitis s/p debridement, HTN and DM.  He was recently admitted with chest pain and is s/p cath with mild LAD disease, 90% RCA and 100% RPL. He is now s/p BMS to RCA on 8/1/2019  He now presents with acute right sided weakness and slurred speech and is s/p tpa.    -no sxs of acute ischemia  - He is s/p BMS on 8/1/2019. He is now s/p 1 week course of triple therapy, and should remain on Plavix and full dose a/c.  - cont Plavix   - cont statin    - remains in sr on telemetry, with no additional AF.   - would prefer to resume Amiodarone with hold parameters, 100mg daily  - BB had been held in the setting of bradycardia, and can continue to hold this  - Full dose a/c resumed     - Euvolemic on exam. Can hold po Lasix temporarily.     - Watch creatinine and electrolytes. Keep K>4, Mg>2  - Will follow with you.

## 2019-08-17 NOTE — PROGRESS NOTE ADULT - ATTENDING COMMENTS
Patient seen and examined, agree with above     50 y/o male with hx CAD, recent PCI (8/2/19), A.fib on apixaban (didn't take it for the past 10 days due to insurance issues), HTN, DM2 (on insulin), perforated antral ulcer s/p omentopexy and plication, left big toe OM s/p amputation, presented with slurred speech (resolved PTA) and R sided weakness, received tPA for acute CVA (likely L MCA).     Sinus bradycardia while sleeping     Recs:   Neuro - no CVA, no further w/u at this time, continue apixaban, Plavix and statin, discharge planning to rehab  CV - BP stable, continue Imdur, will discuss continuation of amiodarone with cardiology given sinus bradycardia, continue apixaban, Plavix, statin  Resp - stable   GI - tolerating po diet, continue PPI, Carafate   Renal - stable   ID - no active issues   Endo - glucose improving on Lantus 8 units qhs, Humalog 3 units premeal   MSK - PT eval, fall precautions   PPx - apixaban     Stable for medicine, discharge planning to rehab

## 2019-08-18 ENCOUNTER — TRANSCRIPTION ENCOUNTER (OUTPATIENT)
Age: 49
End: 2019-08-18

## 2019-08-18 LAB
-  AMIKACIN: SIGNIFICANT CHANGE UP
-  AMPICILLIN/SULBACTAM: SIGNIFICANT CHANGE UP
-  CEFEPIME: SIGNIFICANT CHANGE UP
-  CEFTAZIDIME: SIGNIFICANT CHANGE UP
-  CEFTRIAXONE: SIGNIFICANT CHANGE UP
-  CIPROFLOXACIN: SIGNIFICANT CHANGE UP
-  GENTAMICIN: SIGNIFICANT CHANGE UP
-  IMIPENEM: SIGNIFICANT CHANGE UP
-  LEVOFLOXACIN: SIGNIFICANT CHANGE UP
-  MEROPENEM: SIGNIFICANT CHANGE UP
-  PIPERACILLIN/TAZOBACTAM: SIGNIFICANT CHANGE UP
-  TOBRAMYCIN: SIGNIFICANT CHANGE UP
-  TRIMETHOPRIM/SULFAMETHOXAZOLE: SIGNIFICANT CHANGE UP
ANION GAP SERPL CALC-SCNC: 7 MMOL/L — SIGNIFICANT CHANGE UP (ref 5–17)
BUN SERPL-MCNC: 15 MG/DL — SIGNIFICANT CHANGE UP (ref 7–23)
CALCIUM SERPL-MCNC: 8.4 MG/DL — LOW (ref 8.5–10.1)
CHLORIDE SERPL-SCNC: 111 MMOL/L — HIGH (ref 96–108)
CO2 SERPL-SCNC: 28 MMOL/L — SIGNIFICANT CHANGE UP (ref 22–31)
CREAT SERPL-MCNC: 0.99 MG/DL — SIGNIFICANT CHANGE UP (ref 0.5–1.3)
CULTURE RESULTS: SIGNIFICANT CHANGE UP
GLUCOSE SERPL-MCNC: 126 MG/DL — HIGH (ref 70–99)
HCT VFR BLD CALC: 27.8 % — LOW (ref 39–50)
HGB BLD-MCNC: 8.5 G/DL — LOW (ref 13–17)
MAGNESIUM SERPL-MCNC: 2.1 MG/DL — SIGNIFICANT CHANGE UP (ref 1.6–2.6)
MCHC RBC-ENTMCNC: 26.1 PG — LOW (ref 27–34)
MCHC RBC-ENTMCNC: 30.6 GM/DL — LOW (ref 32–36)
MCV RBC AUTO: 85.3 FL — SIGNIFICANT CHANGE UP (ref 80–100)
METHOD TYPE: SIGNIFICANT CHANGE UP
METHOD TYPE: SIGNIFICANT CHANGE UP
NRBC # BLD: 0 /100 WBCS — SIGNIFICANT CHANGE UP (ref 0–0)
ORGANISM # SPEC MICROSCOPIC CNT: SIGNIFICANT CHANGE UP
PHOSPHATE SERPL-MCNC: 4 MG/DL — SIGNIFICANT CHANGE UP (ref 2.5–4.5)
PLATELET # BLD AUTO: 339 K/UL — SIGNIFICANT CHANGE UP (ref 150–400)
POTASSIUM SERPL-MCNC: 3.8 MMOL/L — SIGNIFICANT CHANGE UP (ref 3.5–5.3)
POTASSIUM SERPL-SCNC: 3.8 MMOL/L — SIGNIFICANT CHANGE UP (ref 3.5–5.3)
RBC # BLD: 3.26 M/UL — LOW (ref 4.2–5.8)
RBC # FLD: 13.6 % — SIGNIFICANT CHANGE UP (ref 10.3–14.5)
SODIUM SERPL-SCNC: 146 MMOL/L — HIGH (ref 135–145)
SPECIMEN SOURCE: SIGNIFICANT CHANGE UP
WBC # BLD: 8.11 K/UL — SIGNIFICANT CHANGE UP (ref 3.8–10.5)
WBC # FLD AUTO: 8.11 K/UL — SIGNIFICANT CHANGE UP (ref 3.8–10.5)

## 2019-08-18 PROCEDURE — 99233 SBSQ HOSP IP/OBS HIGH 50: CPT | Mod: GC

## 2019-08-18 PROCEDURE — 99232 SBSQ HOSP IP/OBS MODERATE 35: CPT

## 2019-08-18 RX ORDER — POTASSIUM CHLORIDE 20 MEQ
40 PACKET (EA) ORAL ONCE
Refills: 0 | Status: COMPLETED | OUTPATIENT
Start: 2019-08-18 | End: 2019-08-18

## 2019-08-18 RX ORDER — DEXTROSE 50 % IN WATER 50 %
25 SYRINGE (ML) INTRAVENOUS ONCE
Refills: 0 | Status: DISCONTINUED | OUTPATIENT
Start: 2019-08-18 | End: 2019-08-21

## 2019-08-18 RX ORDER — DEXTROSE 50 % IN WATER 50 %
12.5 SYRINGE (ML) INTRAVENOUS ONCE
Refills: 0 | Status: DISCONTINUED | OUTPATIENT
Start: 2019-08-18 | End: 2019-08-21

## 2019-08-18 RX ORDER — INSULIN LISPRO 100/ML
3 VIAL (ML) SUBCUTANEOUS
Refills: 0 | Status: DISCONTINUED | OUTPATIENT
Start: 2019-08-18 | End: 2019-08-21

## 2019-08-18 RX ORDER — SODIUM CHLORIDE 9 MG/ML
1000 INJECTION, SOLUTION INTRAVENOUS
Refills: 0 | Status: DISCONTINUED | OUTPATIENT
Start: 2019-08-18 | End: 2019-08-21

## 2019-08-18 RX ORDER — INSULIN LISPRO 100/ML
VIAL (ML) SUBCUTANEOUS
Refills: 0 | Status: DISCONTINUED | OUTPATIENT
Start: 2019-08-18 | End: 2019-08-21

## 2019-08-18 RX ORDER — DEXTROSE 50 % IN WATER 50 %
15 SYRINGE (ML) INTRAVENOUS ONCE
Refills: 0 | Status: DISCONTINUED | OUTPATIENT
Start: 2019-08-18 | End: 2019-08-21

## 2019-08-18 RX ORDER — GLUCAGON INJECTION, SOLUTION 0.5 MG/.1ML
1 INJECTION, SOLUTION SUBCUTANEOUS ONCE
Refills: 0 | Status: DISCONTINUED | OUTPATIENT
Start: 2019-08-18 | End: 2019-08-21

## 2019-08-18 RX ORDER — INSULIN GLARGINE 100 [IU]/ML
8 INJECTION, SOLUTION SUBCUTANEOUS AT BEDTIME
Refills: 0 | Status: DISCONTINUED | OUTPATIENT
Start: 2019-08-18 | End: 2019-08-21

## 2019-08-18 RX ADMIN — Medication 3 UNIT(S): at 12:07

## 2019-08-18 RX ADMIN — CLOPIDOGREL BISULFATE 75 MILLIGRAM(S): 75 TABLET, FILM COATED ORAL at 23:42

## 2019-08-18 RX ADMIN — Medication 3 UNIT(S): at 07:41

## 2019-08-18 RX ADMIN — Medication 2: at 17:23

## 2019-08-18 RX ADMIN — Medication 3 UNIT(S): at 17:24

## 2019-08-18 RX ADMIN — Medication 650 MILLIGRAM(S): at 23:47

## 2019-08-18 RX ADMIN — ATORVASTATIN CALCIUM 80 MILLIGRAM(S): 80 TABLET, FILM COATED ORAL at 23:42

## 2019-08-18 RX ADMIN — PANTOPRAZOLE SODIUM 40 MILLIGRAM(S): 20 TABLET, DELAYED RELEASE ORAL at 05:14

## 2019-08-18 RX ADMIN — Medication 1 GRAM(S): at 05:14

## 2019-08-18 RX ADMIN — Medication 40 MILLIEQUIVALENT(S): at 07:40

## 2019-08-18 RX ADMIN — TAMSULOSIN HYDROCHLORIDE 0.4 MILLIGRAM(S): 0.4 CAPSULE ORAL at 22:40

## 2019-08-18 RX ADMIN — AMIODARONE HYDROCHLORIDE 100 MILLIGRAM(S): 400 TABLET ORAL at 05:14

## 2019-08-18 RX ADMIN — Medication 6: at 22:38

## 2019-08-18 RX ADMIN — Medication 2: at 12:06

## 2019-08-18 RX ADMIN — INSULIN GLARGINE 8 UNIT(S): 100 INJECTION, SOLUTION SUBCUTANEOUS at 22:39

## 2019-08-18 RX ADMIN — Medication 1 GRAM(S): at 16:38

## 2019-08-18 RX ADMIN — APIXABAN 5 MILLIGRAM(S): 2.5 TABLET, FILM COATED ORAL at 22:38

## 2019-08-18 RX ADMIN — APIXABAN 5 MILLIGRAM(S): 2.5 TABLET, FILM COATED ORAL at 11:49

## 2019-08-18 RX ADMIN — Medication 1 GRAM(S): at 22:36

## 2019-08-18 NOTE — DISCHARGE NOTE PROVIDER - CARE PROVIDERS DIRECT ADDRESSES
[email or web address]

## 2019-08-18 NOTE — PROGRESS NOTE ADULT - SUBJECTIVE AND OBJECTIVE BOX
Patient is a 49y old  Male who presents with a chief complaint of CVA (17 Aug 2019 15:58)    24 hour events: no new events     REVIEW OF SYSTEMS  Constitutional: No fever, chills, fatigue  Neuro: No headache, numbness, weakness  Resp: No cough, wheezing, shortness of breath  CVS: No chest pain, palpitations, leg swelling  GI: No abdominal pain, nausea, vomiting, diarrhea   : No dysuria, frequency, incontinence  Skin: No itching, burning, rashes, or lesions   Msk: No joint pain or swelling  Psych: No depression, anxiety, mood swings  Heme: No bleeding    T(F): 98.1 (08-18-19 @ 07:25), Max: 98.2 (08-17-19 @ 15:32)  HR: 52 (08-18-19 @ 06:00) (45 - 77)  BP: 129/64 (08-18-19 @ 06:00) (95/11 - 171/69)  RR: 24 (08-18-19 @ 06:00) (16 - 28)  SpO2: 97% (08-18-19 @ 06:00) (92% - 100%)    CAPILLARY BLOOD GLUCOSE  POCT Blood Glucose.: 144 mg/dL (18 Aug 2019 07:34)  POCT Blood Glucose.: 193 mg/dL (17 Aug 2019 21:39)  POCT Blood Glucose.: 195 mg/dL (17 Aug 2019 17:15)  POCT Blood Glucose.: 161 mg/dL (17 Aug 2019 11:40)    I&O's Summary    08-17 @ 07:01  -  08-18 @ 07:00  --------------------------------------------------------  IN: 2450 mL / OUT: 4020 mL / NET: -1570 mL    PHYSICAL EXAM  General: NAD  CNS: AAOx3, right side remains weak (doubt real deficit)  HEENT: pupils reactive b/l   Resp: clear   CVS: S1S2, regular, bradycardic   Abd: soft, NT, +BS  Ext: no edema   Skin: warm     MEDICATIONS  amiodarone    Tablet Oral  tamsulosin Oral  atorvastatin Oral  insulin glargine Injectable (LANTUS) SubCutaneous  insulin lispro (HumaLOG) corrective regimen sliding scale SubCutaneous  insulin lispro Injectable (HumaLOG) SubCutaneous  acetaminophen   Tablet .. Oral PRN  ondansetron Injectable IV Push PRN  apixaban Oral  clopidogrel Tablet Oral  pantoprazole    Tablet Oral  sucralfate Oral                        8.5    8.11  )-----------( 339      ( 18 Aug 2019 05:30 )             27.8     08-18    146<H>  |  111<H>  |  15  ----------------------------<  126<H>  3.8   |  28  |  0.99    Ca    8.4<L>      18 Aug 2019 05:30  Phos  4.0     08-18  Mg     2.1     08-18    .Urine Catheterized   >100,000 CFU/ml Gram Negative Rods Identification and susceptibility to  follow.  >100,000 CFU/ml Enterococcus faecalis -- 08-15 @ 11:08    CENTRAL LINE: N         RODRIGUEZ: Y                               REMOVE: N (chronic)  A-LINE: N                       GLOBAL ISSUE/BEST PRACTICE  Analgesia: NA  Sedation: NA  CAM-ICU: neg  HOB elevation: yes  Stress ulcer prophylaxis: NA  VTE prophylaxis: Y  Glycemic control: Y  Nutrition: Y    CODE STATUS: full  GO discussion: Y

## 2019-08-18 NOTE — DISCHARGE NOTE PROVIDER - PROVIDER TOKENS
PROVIDER:[TOKEN:[[id number]],FOLLOWUP:[1 week]],FREE:[LAST:[Rico],FIRST:[Linette],PHONE:[phone]],FAX:[(   )    -],FOLLOWUP:[1 week]],FREE:[LAST:[[facility]],PHONE:[phone]],FAX:[(   )    -],ADDRESS:[[address] [place] [address]],FOLLOWUP:[1 week]],PROVIDER:[TOKEN:[[id number]]]

## 2019-08-18 NOTE — PROGRESS NOTE ADULT - ASSESSMENT
49 M with PAF on Eliquis, RUL subsegmental PE, hx of recent NSTEMI, perforated antral ulcer, osteomyelitis s/p debridement, HTN and DM.  He was recently admitted with chest pain and is s/p cath with mild LAD disease, 90% RCA and 100% RPL. He is now s/p BMS to RCA on 8/1/2019  He now presents with acute right sided weakness and slurred speech and is s/p tpa.    -no sxs of acute ischemia  - He is s/p BMS on 8/1/2019. He is now s/p 1 week course of triple therapy, and should remain on Plavix and full dose a/c.  - cont Plavix   - cont statin    - remains in sr on telemetry, with no additional AF.   - cont Amiodarone with hold parameters, 100mg daily  - BB had been held in the setting of bradycardia, and can continue to hold this  - Full dose a/c resumed     - Euvolemic on exam. Can hold po Lasix temporarily.     - Watch creatinine and electrolytes. Keep K>4, Mg>2  - Will follow with you.  - dc planning julian

## 2019-08-18 NOTE — DISCHARGE NOTE PROVIDER - HOSPITAL COURSE
HPI:    49M with PMH CAD s/p BMS to RCA (8/2019), RUL subsegmental PE (6/2019, on Eliquis), hx of NSTEMI and PAF s/p ex-lap omentopexy and plication for perforated antral ulcer (5/2019), osteomyelitis s/p debridement,  HTN and DM2 (not on insulin) presenting to the ED after having slurred speech and R sided arm and LE weakness, numbness and tingling that started around 6pm-6:30pm on day of presentation. Also felt confused and disoriented at times, associated with some blurry vision. In the ED, received TPA for CVA. Patient stated currently still felt numbness/ tingling (pins and needles) in RUE and RLE however blurry vision comes and goes s/p TPA. Denied chest pain, palpitations, headache, n/v, abd pain, urinary or bowel incontinence, lightheadedness/ dizziness, no fall or LOC.     Of note patient recently admitted to [place] and transferred to [place] on 7/31/2019 for chest pain, had cardiac cath done showed mild LAD disease, 90% RCA and 100% RPL, had BMS to RCA placed. His baby asa was stopped but was started on Plavix and Eliquis on discharge 8/9/19, since discharge patient stated he had not had Eliquis because not covered by insurance (only was taking Plavix).     In the ED, T 99.6F, HR 80, /74, RR 15, SpO2 97% on RA. Labs significant for H/H 9.4/31.1 (baseline around 9.5-10.5), PT/INR 11.9/1.05, glucose 296.     EKG: NSR, anterior infarct age undetermined. Noncontrast head CT: Stable exam. No acute intracranial hemorrhage or mass effect vasogenic edema. CTA neck: No major vessel occlusion, hemodynamically significant stenosis. CTA head: No major vessel occlusion or hemodynamically significant stenosis. 3.7 x 4.1 x 5.4 mm lobulated right MCA bifurcation aneurysm.        HOSPITAL COURSE:     Patient was admitted to the ICU for right sided weakness/slurred speech s/p TPA in the ED. MR head was normal with no acute infarct and small amount of fluid in the left mastoid air cells. Echo showed normal left ventricular internal dimensions and systolic function, estimated LVEF 65%.  Home amiodarone and furosemide were held due to bradycardia, normotension, euvolemia. Patient continued to have stable sinus bradycardia, amiodarone was restarted, Imdur was discontinued as BP was stable. +UCx likely colonization due to chronic Aguilera, neg UA and no other signs/symptoms of infection - monitored off Abx. Patient was transferred from the ICU to [facility]. PT/OT evaluated patient, to be discharged to [facility].         Patient was seen and examined on the day of discharge and is medically optimized for discharge, with close outpatient follow up. Patient to follow up outpatient with neurosurgery (Dr. Vigil) for R MCA aneurysm. Eliquis was sent to [facility], to be given to patient at discharge. HPI:    49M with PMH CAD s/p BMS to RCA (8/2019), RUL subsegmental PE (6/2019, on Eliquis), hx of NSTEMI and PAF s/p ex-lap omentopexy and plication for perforated antral ulcer (5/2019), osteomyelitis s/p debridement,  HTN and DM2 (not on insulin) presenting to the ED after having slurred speech and R sided arm and LE weakness, numbness and tingling that started around 6pm-6:30pm on day of presentation. Also felt confused and disoriented at times, associated with some blurry vision. In the ED, received TPA for CVA. Patient stated currently still felt numbness/ tingling (pins and needles) in RUE and RLE however blurry vision comes and goes s/p TPA. Denied chest pain, palpitations, headache, n/v, abd pain, urinary or bowel incontinence, lightheadedness/ dizziness, no fall or LOC.     Of note patient recently admitted to [place] and transferred to [place] on 7/31/2019 for chest pain, had cardiac cath done showed mild LAD disease, 90% RCA and 100% RPL, had BMS to RCA placed. His baby asa was stopped but was started on Plavix and Eliquis on discharge 8/9/19, since discharge patient stated he had not had Eliquis because not covered by insurance (only was taking Plavix).     In the ED, T 99.6F, HR 80, /74, RR 15, SpO2 97% on RA. Labs significant for H/H 9.4/31.1 (baseline around 9.5-10.5), PT/INR 11.9/1.05, glucose 296.     EKG: NSR, anterior infarct age undetermined. Noncontrast head CT: Stable exam. No acute intracranial hemorrhage or mass effect vasogenic edema. CTA neck: No major vessel occlusion, hemodynamically significant stenosis. CTA head: No major vessel occlusion or hemodynamically significant stenosis. 3.7 x 4.1 x 5.4 mm lobulated right MCA bifurcation aneurysm.        HOSPITAL COURSE:     Patient was admitted to the ICU for right sided weakness/slurred speech s/p TPA in the ED. Cardio (Dr Carbajal's group) and neurology (Dr Jon) were consulted. MR head was normal with no acute infarct and small amount of fluid in the left mastoid air cells. Echo showed normal left ventricular internal dimensions and systolic function, estimated LVEF 65%.  Home amiodarone and furosemide were held due to bradycardia, normotension, euvolemia. Patient continued to have stable sinus bradycardia, amiodarone was restarted, Imdur was discontinued as BP was stable. +UCx likely colonization due to chronic Aguilera, neg UA and no other signs/symptoms of infection - monitored off Abx. Patient was transferred from the ICU to [facility]. PT/OT evaluated patient, to be discharged to [facility]. Patient failed TOV on 8/19 - bladder scan showed 754 mL, new Aguilera was placed and 700 mL was drained. Patient to follow up with urology as outpatient. NP Pat Weill and endocrine Dr Perlman were consulted for DM regimen.        Patient was seen and examined on the day of discharge and is medically optimized for discharge, with close outpatient follow up. Patient to follow up outpatient with neurosurgery (Dr. Vigil) for R MCA aneurysm. Per ICU, Eliquis was sent to [facility], to be given to patient at discharge. HPI:    49M with PMH CAD s/p BMS to RCA (8/2019), RUL subsegmental PE (6/2019, on Eliquis), hx of NSTEMI and PAF s/p ex-lap omentopexy and plication for perforated antral ulcer (5/2019), osteomyelitis s/p debridement,  HTN and DM2 (not on insulin) presenting to the ED after having slurred speech and R sided arm and LE weakness, numbness and tingling that started around 6pm-6:30pm on day of presentation. Also felt confused and disoriented at times, associated with some blurry vision. In the ED, received TPA for CVA. Patient stated currently still felt numbness/ tingling (pins and needles) in RUE and RLE however blurry vision comes and goes s/p TPA. Denied chest pain, palpitations, headache, n/v, abd pain, urinary or bowel incontinence, lightheadedness/ dizziness, no fall or LOC. Of note patient recently admitted to [facility] and transferred to [place] on 7/31/2019 for chest pain, had cardiac cath done showed mild LAD disease, 90% RCA and 100% RPL, had BMS to RCA placed. His baby asa was stopped but was started on Plavix and Eliquis on discharge 8/9/19, since discharge patient stated he had not had Eliquis because not covered by insurance (only was taking Plavix).     In the ED, T 99.6F, HR 80, /74, RR 15, SpO2 97% on RA. Labs significant for H/H 9.4/31.1 (baseline around 9.5-10.5), PT/INR 11.9/1.05, glucose 296.     EKG: NSR, anterior infarct age undetermined. Noncontrast head CT: Stable exam. No acute intracranial hemorrhage or mass effect vasogenic edema. CTA neck: No major vessel occlusion, hemodynamically significant stenosis. CTA head: No major vessel occlusion or hemodynamically significant stenosis. 3.7 x 4.1 x 5.4 mm lobulated right MCA bifurcation aneurysm.        HOSPITAL COURSE:     Patient was admitted to the ICU for right sided weakness/slurred speech s/p TPA in the ED. Cardio (Dr Carbajal's group) and neurology (Dr Jon) were consulted. MR head was normal with no acute infarct and small amount of fluid in the left mastoid air cells. Echo showed normal left ventricular internal dimensions and systolic function, estimated LVEF 65%.  Home amiodarone and furosemide were held due to bradycardia, normotension, euvolemia. Patient continued to have stable sinus bradycardia, amiodarone was restarted, Imdur was discontinued as BP was stable. +UCx likely colonization due to chronic Aguilera, neg UA and no other signs/symptoms of infection - monitored off Abx. Patient was transferred from the ICU to [facility]. PT/OT evaluated patient, to be discharged to [facility]. Patient failed TOV on 8/19 - bladder scan showed 754 mL, new Aguilera was placed and 700 mL was drained. Patient to follow up with urology as outpatient. NP Pat Weill and endocrine Dr Perlman were consulted for DM regimen.        Patient was seen and examined on the day of discharge and is medically optimized for discharge, with close outpatient follow up. Patient to follow up outpatient with neurosurgery (Dr. Vigil) for R MCA aneurysm.         Vital Signs Last 24 Hrs    T(C): 36.8 (20 Aug 2019 05:11), Max: 37.2 (19 Aug 2019 21:38)    T(F): 98.2 (20 Aug 2019 05:11), Max: 98.9 (19 Aug 2019 21:38)    HR: 68 (20 Aug 2019 09:30) (57 - 70)    BP: 133/74 (20 Aug 2019 09:30) (100/60 - 133/79)    BP(mean): --    RR: 18 (20 Aug 2019 05:11) (17 - 18)    SpO2: 98% (20 Aug 2019 09:30) (95% - 98%)        PHYSICAL EXAM:    GENERAL: NAD, well-groomed, well-developed    HEAD: Atraumatic, Normocephalic    EYES: EOMI, conjunctiva and sclera clear    ENMT: Moist mucous membranes, no facial droop, no slurred speech    NECK: Supple, No JVD    NERVOUS SYSTEM: Alert & Oriented X3, Good concentration; reduced motor strength in right upper and lower extremities, reduced sensation in right upper and lower extremities.     CHEST/LUNG: Clear to percussion bilaterally; No rales, rhonchi, wheezing, or rubs    HEART: Regular rate and rhythm; No murmurs, rubs, or gallops    ABDOMEN: Soft, Nontender, Nondistended    EXTREMITIES: 2+ Peripheral Pulses, No clubbing, cyanosis, or edema    SKIN: Warm, dry, well-perfused HPI:    49M with PMH CAD s/p BMS to RCA (8/2019), RUL subsegmental PE (6/2019, on Eliquis), hx of NSTEMI and PAF s/p ex-lap omentopexy and plication for perforated antral ulcer (5/2019), osteomyelitis s/p debridement,  HTN and DM2 (not on insulin) presenting to the ED after having slurred speech and R sided arm and LE weakness, numbness and tingling that started around 6pm-6:30pm on day of presentation. Also felt confused and disoriented at times, associated with some blurry vision. In the ED, received TPA for CVA. Patient stated currently still felt numbness/ tingling (pins and needles) in RUE and RLE however blurry vision comes and goes s/p TPA. Denied chest pain, palpitations, headache, n/v, abd pain, urinary or bowel incontinence, lightheadedness/ dizziness, no fall or LOC. Of note patient recently admitted to [facility] and transferred to [place] on 7/31/2019 for chest pain, had cardiac cath done showed mild LAD disease, 90% RCA and 100% RPL, had BMS to RCA placed. His baby asa was stopped but was started on Plavix and Eliquis on discharge 8/9/19, since discharge patient stated he had not had Eliquis because not covered by insurance (only was taking Plavix).     In the ED, T 99.6F, HR 80, /74, RR 15, SpO2 97% on RA. Labs significant for H/H 9.4/31.1 (baseline around 9.5-10.5), PT/INR 11.9/1.05, glucose 296.     EKG: NSR, anterior infarct age undetermined. Noncontrast head CT: Stable exam. No acute intracranial hemorrhage or mass effect vasogenic edema. CTA neck: No major vessel occlusion, hemodynamically significant stenosis. CTA head: No major vessel occlusion or hemodynamically significant stenosis. 3.7 x 4.1 x 5.4 mm lobulated right MCA bifurcation aneurysm.        HOSPITAL COURSE:     Patient was admitted to the ICU for right sided weakness/slurred speech s/p TPA in the ED. Cardio (Dr Carbajal's group) and neurology (Dr Jon) were consulted. MR head was normal with no acute infarct and small amount of fluid in the left mastoid air cells. Echo showed normal left ventricular internal dimensions and systolic function, estimated LVEF 65%.  Home amiodarone and furosemide were held due to bradycardia, normotension, euvolemia. Patient continued to have stable sinus bradycardia, amiodarone was restarted, Imdur was discontinued as BP was stable. +UCx likely colonization due to chronic Aguilera, neg UA and no other signs/symptoms of infection - monitored off Abx. Patient was transferred from the ICU to [facility]. PT/OT evaluated patient, to be discharged to [facility]. Patient failed TOV on 8/19 - bladder scan showed 754 mL, new Aguilera was placed and 700 mL was drained. Patient to follow up with urology as outpatient. NP Pat Weill and endocrine Dr Perlman were consulted for DM regimen which was adjusted.        Patient was seen and examined on the day of discharge and is medically optimized for discharge, with close outpatient follow up. Patient to follow up outpatient with neurosurgery (Dr. Vigil) for R MCA aneurysm.         Vital Signs Last 24 Hrs    T(C): 36.8 (20 Aug 2019 05:11), Max: 37.2 (19 Aug 2019 21:38)    T(F): 98.2 (20 Aug 2019 05:11), Max: 98.9 (19 Aug 2019 21:38)    HR: 68 (20 Aug 2019 09:30) (57 - 70)    BP: 133/74 (20 Aug 2019 09:30) (100/60 - 133/79)    BP(mean): --    RR: 18 (20 Aug 2019 05:11) (17 - 18)    SpO2: 98% (20 Aug 2019 09:30) (95% - 98%)        PHYSICAL EXAM:    GENERAL: NAD, well-groomed, well-developed    HEAD: Atraumatic, Normocephalic    EYES: EOMI, conjunctiva and sclera clear    ENMT: Moist mucous membranes, no facial droop, no slurred speech    NECK: Supple, No JVD    NERVOUS SYSTEM: Alert & Oriented X3, Good concentration; reduced motor strength in right upper and lower extremities, reduced sensation in right upper and lower extremities.     CHEST/LUNG: Clear to percussion bilaterally; No rales, rhonchi, wheezing, or rubs    HEART: Regular rate and rhythm; No murmurs, rubs, or gallops    ABDOMEN: Soft, Nontender, Nondistended    EXTREMITIES: 2+ Peripheral Pulses, No clubbing, cyanosis, or edema    SKIN: Warm, dry, well-perfused

## 2019-08-18 NOTE — DISCHARGE NOTE PROVIDER - NSDCCPCAREPLAN_GEN_ALL_CORE_FT
PRINCIPAL DISCHARGE DIAGNOSIS  Diagnosis: Right sided weakness  Assessment and Plan of Treatment: - Right sided weakness and slurred speech were consistent with a TIA, per neurology.  - Continue Plavix 75 mg daily, Eliquis 5 mg twice a day.  - Continue Atorvastatin 80 mg daily.  - Follow up with primary care provider Dr Rico within 1 week of discharge for further management.      SECONDARY DISCHARGE DIAGNOSES  Diagnosis: History of aneurysm  Assessment and Plan of Treatment: - CTA head showed 3.7 x 4.1 x 5.4 mm lobulated right MCA bifurcation aneurysm.  - Follow up with neurosurgeon Dr Vigil within 1 week of discharge for management of this R MCA aneurysm.    Diagnosis: History of non-ST elevation myocardial infarction (NSTEMI)  Assessment and Plan of Treatment: - Continue Plavix 75 mg daily, Eliquis 5 mg twice a day.  - Continue Atorvastatin 80 mg daily.  - Follow up with cardiologist Dr Gallegos within 1 week of discharge.    Diagnosis: Afib  Assessment and Plan of Treatment: - Paroxysmal atrial fibrillation  - Continue Amiodarone 100 mg daily, Eliquis 5 mg twice a day.    Diagnosis: Urinary retention  Assessment and Plan of Treatment: Follow up at with urologist at [facility] within 1 week of discharge regarding Aguilera catheter.    Diagnosis: Diabetes  Assessment and Plan of Treatment: - Continue home diabetes regimen.  - Follow up with your primary care provider Dr Rico within 1 week of discharge. PRINCIPAL DISCHARGE DIAGNOSIS  Diagnosis: Right sided weakness  Assessment and Plan of Treatment: - Right sided weakness and slurred speech were consistent with a TIA, per neurology.  - Continue Plavix 75 mg daily, Eliquis 5 mg twice a day.  - Continue Atorvastatin 80 mg daily.  - Follow up with primary care provider Dr Rico within 1 week of discharge for further management.      SECONDARY DISCHARGE DIAGNOSES  Diagnosis: History of aneurysm  Assessment and Plan of Treatment: - CTA head showed 3.7 x 4.1 x 5.4 mm lobulated right MCA bifurcation aneurysm.  - Follow up with neurosurgeon Dr Vigil within 1 week of discharge for management of this R MCA aneurysm.    Diagnosis: History of non-ST elevation myocardial infarction (NSTEMI)  Assessment and Plan of Treatment: - Continue Plavix 75 mg daily, Eliquis 5 mg twice a day.  - Continue Atorvastatin 80 mg daily.  - Follow up with cardiologist Dr Gallegos within 1 week of discharge.    Diagnosis: HTN (hypertension)  Assessment and Plan of Treatment: - Home medication Imdur was discontinued.  - Start taking Losartan 25 mg daily, hold for SBP < 100.    Diagnosis: Afib  Assessment and Plan of Treatment: - Paroxysmal atrial fibrillation  - Continue Amiodarone 100 mg daily, Eliquis 5 mg twice a day.    Diagnosis: Urinary retention  Assessment and Plan of Treatment: Follow up at with urologist at [facility] within 1 week of discharge regarding urinary retention.    Diagnosis: Diabetes  Assessment and Plan of Treatment: - Continue home diabetes regimen.  - Follow up with your primary care provider Dr Rico within 1 week of discharge. PRINCIPAL DISCHARGE DIAGNOSIS  Diagnosis: Right sided weakness  Assessment and Plan of Treatment: - Right sided weakness and slurred speech were consistent with a TIA, per neurology.  - Continue Plavix 75 mg daily, Eliquis 5 mg twice a day.  - Continue Atorvastatin 80 mg daily.  - Follow up with primary care provider Dr Rico within 1 week of discharge for further management.      SECONDARY DISCHARGE DIAGNOSES  Diagnosis: History of aneurysm  Assessment and Plan of Treatment: - CTA head showed 3.7 x 4.1 x 5.4 mm lobulated right MCA bifurcation aneurysm.  - Follow up with neurosurgeon Dr Vigil within 1 week of discharge for management of this R MCA aneurysm.    Diagnosis: History of non-ST elevation myocardial infarction (NSTEMI)  Assessment and Plan of Treatment: - Continue Plavix 75 mg daily, Eliquis 5 mg twice a day.  - Continue Atorvastatin 80 mg daily.  - Follow up with cardiologist Dr Gallegos within 1 week of discharge.    Diagnosis: HTN (hypertension)  Assessment and Plan of Treatment: - Home medication Imdur was discontinued.  - Start taking Losartan 25 mg daily, hold for SBP < 100.    Diagnosis: Afib  Assessment and Plan of Treatment: - Paroxysmal atrial fibrillation  - Continue Amiodarone 100 mg daily, Eliquis 5 mg twice a day.    Diagnosis: Urinary retention  Assessment and Plan of Treatment: - Trial of void was unsuccessful on 8/19/19.  - Continue Tamsulosin 0.4 mg at bedtime.  - Follow up at with urologist at [facility] within 1 week of discharge regarding removal of Aguilera catheter and urinary retention.    Diagnosis: Diabetes  Assessment and Plan of Treatment: - Continue home diabetes regimen.  - Follow up with your primary care provider Dr Rico within 1 week of discharge. PRINCIPAL DISCHARGE DIAGNOSIS  Diagnosis: Right sided weakness  Assessment and Plan of Treatment: - Right sided weakness and slurred speech were consistent with a TIA, per neurology.  - Continue Plavix 75 mg daily, Eliquis 5 mg twice a day.  - Continue Atorvastatin 80 mg daily.  - Follow up with primary care provider Dr Rico within 1 week of discharge for further management.      SECONDARY DISCHARGE DIAGNOSES  Diagnosis: History of aneurysm  Assessment and Plan of Treatment: - CTA head showed 3.7 x 4.1 x 5.4 mm lobulated right MCA bifurcation aneurysm.  - Follow up with neurosurgeon Dr Vigil within 1 week of discharge for management of this R MCA aneurysm.    Diagnosis: History of non-ST elevation myocardial infarction (NSTEMI)  Assessment and Plan of Treatment: - Continue Plavix 75 mg daily, Eliquis 5 mg twice a day.  - Continue Atorvastatin 80 mg daily.  - Follow up with cardiologist Dr Gallegos within 1 week of discharge.    Diagnosis: HTN (hypertension)  Assessment and Plan of Treatment: - Home medication Imdur was discontinued.  - Start taking Losartan 25 mg daily, hold for SBP < 100.    Diagnosis: Afib  Assessment and Plan of Treatment: - Paroxysmal atrial fibrillation  - Continue Amiodarone 100 mg daily, Eliquis 5 mg twice a day.    Diagnosis: Urinary retention  Assessment and Plan of Treatment: - Trial of void was unsuccessful on 8/19/19.  - Continue Tamsulosin 0.4 mg at bedtime.  - Follow up at with urologist at [facility] within 1 week of discharge regarding removal of Aguilera catheter and urinary retention.    Diagnosis: Diabetes  Assessment and Plan of Treatment: - Continue Lispro 5 units three times a day before meals, Lantus 8 units at bedtime.  - Continue moderate dose humalog sliding scale (as indicated in med rec) three times a day before meals and at bedtime.  - Follow up with your primary care provider Dr Rico within 1 week of discharge. PRINCIPAL DISCHARGE DIAGNOSIS  Diagnosis: TIA (transient ischemic attack)  Assessment and Plan of Treatment: - Your symptoms were consistent with a TIA, per neurology. MR head was normal.  - Continue Plavix 75 mg daily, Eliquis 5 mg twice a day.  - Continue Atorvastatin 80 mg daily.  - Follow up with primary care provider Dr Rico within 1 week of discharge for further management.      SECONDARY DISCHARGE DIAGNOSES  Diagnosis: History of aneurysm  Assessment and Plan of Treatment: - CTA head showed 3.7 x 4.1 x 5.4 mm lobulated right MCA bifurcation aneurysm.  - Follow up with neurosurgeon Dr Vigil within 1 week of discharge for management of this R MCA aneurysm.    Diagnosis: History of non-ST elevation myocardial infarction (NSTEMI)  Assessment and Plan of Treatment: - Continue Plavix 75 mg daily, Eliquis 5 mg twice a day.  - Continue Atorvastatin 80 mg daily.  - Follow up with cardiologist Dr Gallegos within 1 week of discharge.    Diagnosis: HTN (hypertension)  Assessment and Plan of Treatment: - Home medication Imdur was discontinued.  - Start taking Losartan 25 mg daily, hold for SBP < 100.    Diagnosis: Afib  Assessment and Plan of Treatment: - Paroxysmal atrial fibrillation  - Continue Amiodarone 100 mg daily, Eliquis 5 mg twice a day.    Diagnosis: Urinary retention  Assessment and Plan of Treatment: - Trial of void was unsuccessful on 8/19/19.  - Continue Tamsulosin 0.4 mg at bedtime.  - Follow up at with urologist at [facility] within 1 week of discharge regarding removal of Aguilera catheter and urinary retention.    Diagnosis: Diabetes  Assessment and Plan of Treatment: - Continue Lispro 5 units three times a day before meals, Lantus 8 units at bedtime.  - Continue moderate dose humalog sliding scale (as indicated in med rec) three times a day before meals and at bedtime.  - Follow up with your primary care provider Dr Rico within 1 week of discharge.

## 2019-08-18 NOTE — DISCHARGE NOTE PROVIDER - CARE PROVIDER_API CALL
Ed Vigil)  Neurological Surgery  [facility], [address] [place] [address]  Phone: [phone]  Fax: [phone]  Follow Up Time: 1 week    Linette Rico  Phone: [phone]  Fax: (   )    -  Follow Up Time: 1 week    [facility],   [address] [place] [id number]  Phone: [phone]  Fax: (   )    -  Follow Up Time: 1 week    Ed Gallegos)  Cardiovascular Disease  [address] [place] [address]  Phone: [phone]  Fax: [phone]  Follow Up Time:

## 2019-08-18 NOTE — DISCHARGE NOTE PROVIDER - NSDCACTIVITY_GEN_ALL_CORE
Walking - Indoors allowed/No heavy lifting/straining/Do not drive or operate machinery/Bathing allowed

## 2019-08-18 NOTE — DISCHARGE NOTE PROVIDER - INSTRUCTIONS
Eat a heart healthy diet that is low in saturated fats and salt, and includes consistent carbohydrates, whole grains, fruits, vegetables and lean protein.

## 2019-08-18 NOTE — PROGRESS NOTE ADULT - SUBJECTIVE AND OBJECTIVE BOX
[facility]    Bolivar Carbajal, El, Brittany, Zahra, Reynaldo, Román      [phone]    CHIEF COMPLAINT: Patient is a 49y old  Male who presents with a chief complaint of CVA (18 Aug 2019 07:51)      Follow Up: paf, cad s/p mi, pe    Interim history: The patient reports no new symptoms.  Denies chest discomfort and shortness of breath.  No abdominal pain.  No new neurologic symptoms, but remains weak.      MEDICATIONS  (STANDING):  amiodarone    Tablet 100 milliGRAM(s) Oral daily  apixaban 5 milliGRAM(s) Oral two times a day  atorvastatin 80 milliGRAM(s) Oral at bedtime  clopidogrel Tablet 75 milliGRAM(s) Oral daily  dextrose 5%. 1000 milliLiter(s) (50 mL/Hr) IV Continuous <Continuous>  dextrose 50% Injectable 12.5 Gram(s) IV Push once  dextrose 50% Injectable 25 Gram(s) IV Push once  dextrose 50% Injectable 25 Gram(s) IV Push once  insulin glargine Injectable (LANTUS) 8 Unit(s) SubCutaneous at bedtime  insulin lispro (HumaLOG) corrective regimen sliding scale   SubCutaneous Before meals and at bedtime  insulin lispro Injectable (HumaLOG) 3 Unit(s) SubCutaneous three times a day before meals  pantoprazole    Tablet 40 milliGRAM(s) Oral before breakfast  sucralfate 1 Gram(s) Oral four times a day  tamsulosin 0.4 milliGRAM(s) Oral at bedtime    MEDICATIONS  (PRN):  acetaminophen   Tablet .. 650 milliGRAM(s) Oral every 6 hours PRN Mild Pain (1 - 3)  dextrose 40% Gel 15 Gram(s) Oral once PRN Blood Glucose LESS THAN 70 milliGRAM(s)/deciliter  glucagon  Injectable 1 milliGRAM(s) IntraMuscular once PRN Glucose LESS THAN 70 milligrams/deciliter  ondansetron Injectable 4 milliGRAM(s) IV Push every 6 hours PRN Nausea and/or Vomiting      REVIEW OF SYSTEMS:  eye, ent, GI, , allergic, dermatologic, musculoskeletal and neurologic are negative except as described above    Vital Signs Last 24 Hrs  T(C): 36.8 (18 Aug 2019 13:10), Max: 37 (18 Aug 2019 10:17)  T(F): 98.3 (18 Aug 2019 13:10), Max: 98.6 (18 Aug 2019 10:17)  HR: 53 (18 Aug 2019 13:10) (49 - 77)  BP: 147/73 (18 Aug 2019 13:10) (95/11 - 171/69)  BP(mean): 104 (18 Aug 2019 08:00) (76 - 106)  RR: 18 (18 Aug 2019 13:10) (16 - 28)  SpO2: 98% (18 Aug 2019 13:10) (94% - 100%)    I&O's Summary    17 Aug 2019 07:01  -  18 Aug 2019 07:00  --------------------------------------------------------  IN: 2450 mL / OUT: 4220 mL / NET: -1770 mL    18 Aug 2019 07:01  -  18 Aug 2019 13:55  --------------------------------------------------------  IN: 640 mL / OUT: 450 mL / NET: 190 mL        Telemetry past 24h: sb/sr    PHYSICAL EXAM:    Constitutional: well-nourished, well-developed, NAD   HEENT:  MMM, sclerae anicteric, conjunctivae clear, no oral cyanosis.  Pulmonary: Non-labored, breath sounds are clear bilaterally, No wheezing, rales or rhonchi  Cardiovascular: Regular, S1 and S2.  No murmur.  No rubs, gallops or clicks  Gastrointestinal: Bowel Sounds present, soft, nontender.   Lymph: No peripheral edema.   Neurological: Alert, weak no change  Skin: No rashes.  Psych:  Mood & affect appropriate    LABS: All Labs Reviewed:                        8.5    8.11  )-----------( 339      ( 18 Aug 2019 05:30 )             27.8                         8.4    7.55  )-----------( 354      ( 17 Aug 2019 05:54 )             27.1                         8.9    7.48  )-----------( 391      ( 16 Aug 2019 05:13 )             29.5     18 Aug 2019 05:30    146    |  111    |  15     ----------------------------<  126    3.8     |  28     |  0.99   17 Aug 2019 05:54    145    |  107    |  13     ----------------------------<  153    3.8     |  30     |  0.90   16 Aug 2019 05:13    143    |  107    |  14     ----------------------------<  186    4.1     |  29     |  1.10     Ca    8.4        18 Aug 2019 05:30  Ca    9.0        17 Aug 2019 05:54  Ca    8.8        16 Aug 2019 05:13  Phos  4.0       18 Aug 2019 05:30  Phos  4.3       17 Aug 2019 05:54  Phos  4.1       16 Aug 2019 05:13  Mg     2.1       18 Aug 2019 05:30  Mg     2.1       17 Aug 2019 05:54  Mg     2.1       16 Aug 2019 05:13            Blood Culture: Organism Acinetobacter baumannii  Gram Stain Blood -- Gram Stain --  Specimen Source .Urine Catheterized  Culture-Blood --            RADIOLOGY:    EKG:    Echo:

## 2019-08-18 NOTE — DISCHARGE NOTE PROVIDER - NSDCFUADDINST_GEN_ALL_CORE_FT
- Follow up with your primary care provider Dr Rico, neurosurgeon Dr Vigil, cardiologist Dr Gallegos and urologist at [facility] within 1 week.    - Patient and family are responsible to set up all follow up appointments.  - Continue taking your medications as directed.  - If symptoms persist/worsen, please call your primary care provider or return to the ED.

## 2019-08-19 DIAGNOSIS — I10 ESSENTIAL (PRIMARY) HYPERTENSION: ICD-10-CM

## 2019-08-19 DIAGNOSIS — G45.9 TRANSIENT CEREBRAL ISCHEMIC ATTACK, UNSPECIFIED: ICD-10-CM

## 2019-08-19 LAB
ANION GAP SERPL CALC-SCNC: 7 MMOL/L — SIGNIFICANT CHANGE UP (ref 5–17)
BUN SERPL-MCNC: 14 MG/DL — SIGNIFICANT CHANGE UP (ref 7–23)
CALCIUM SERPL-MCNC: 8.5 MG/DL — SIGNIFICANT CHANGE UP (ref 8.5–10.1)
CHLORIDE SERPL-SCNC: 109 MMOL/L — HIGH (ref 96–108)
CO2 SERPL-SCNC: 27 MMOL/L — SIGNIFICANT CHANGE UP (ref 22–31)
CREAT SERPL-MCNC: 0.96 MG/DL — SIGNIFICANT CHANGE UP (ref 0.5–1.3)
GLUCOSE SERPL-MCNC: 110 MG/DL — HIGH (ref 70–99)
HCT VFR BLD CALC: 27.3 % — LOW (ref 39–50)
HGB BLD-MCNC: 8.4 G/DL — LOW (ref 13–17)
MAGNESIUM SERPL-MCNC: 2.1 MG/DL — SIGNIFICANT CHANGE UP (ref 1.6–2.6)
MCHC RBC-ENTMCNC: 26.4 PG — LOW (ref 27–34)
MCHC RBC-ENTMCNC: 30.8 GM/DL — LOW (ref 32–36)
MCV RBC AUTO: 85.8 FL — SIGNIFICANT CHANGE UP (ref 80–100)
NRBC # BLD: 0 /100 WBCS — SIGNIFICANT CHANGE UP (ref 0–0)
PHOSPHATE SERPL-MCNC: 4.7 MG/DL — HIGH (ref 2.5–4.5)
PLATELET # BLD AUTO: 302 K/UL — SIGNIFICANT CHANGE UP (ref 150–400)
POTASSIUM SERPL-MCNC: 4 MMOL/L — SIGNIFICANT CHANGE UP (ref 3.5–5.3)
POTASSIUM SERPL-SCNC: 4 MMOL/L — SIGNIFICANT CHANGE UP (ref 3.5–5.3)
RBC # BLD: 3.18 M/UL — LOW (ref 4.2–5.8)
RBC # FLD: 13.5 % — SIGNIFICANT CHANGE UP (ref 10.3–14.5)
SODIUM SERPL-SCNC: 143 MMOL/L — SIGNIFICANT CHANGE UP (ref 135–145)
WBC # BLD: 8.32 K/UL — SIGNIFICANT CHANGE UP (ref 3.8–10.5)
WBC # FLD AUTO: 8.32 K/UL — SIGNIFICANT CHANGE UP (ref 3.8–10.5)

## 2019-08-19 PROCEDURE — 99232 SBSQ HOSP IP/OBS MODERATE 35: CPT

## 2019-08-19 RX ORDER — IBUPROFEN 200 MG
600 TABLET ORAL ONCE
Refills: 0 | Status: COMPLETED | OUTPATIENT
Start: 2019-08-19 | End: 2019-08-19

## 2019-08-19 RX ORDER — LOSARTAN POTASSIUM 100 MG/1
25 TABLET, FILM COATED ORAL DAILY
Refills: 0 | Status: DISCONTINUED | OUTPATIENT
Start: 2019-08-19 | End: 2019-08-21

## 2019-08-19 RX ADMIN — Medication 6: at 21:34

## 2019-08-19 RX ADMIN — TAMSULOSIN HYDROCHLORIDE 0.4 MILLIGRAM(S): 0.4 CAPSULE ORAL at 21:33

## 2019-08-19 RX ADMIN — Medication 1 GRAM(S): at 17:18

## 2019-08-19 RX ADMIN — Medication 1 GRAM(S): at 01:20

## 2019-08-19 RX ADMIN — Medication 1 GRAM(S): at 23:34

## 2019-08-19 RX ADMIN — Medication 600 MILLIGRAM(S): at 02:00

## 2019-08-19 RX ADMIN — Medication 2: at 17:19

## 2019-08-19 RX ADMIN — ATORVASTATIN CALCIUM 80 MILLIGRAM(S): 80 TABLET, FILM COATED ORAL at 21:33

## 2019-08-19 RX ADMIN — APIXABAN 5 MILLIGRAM(S): 2.5 TABLET, FILM COATED ORAL at 21:33

## 2019-08-19 RX ADMIN — CLOPIDOGREL BISULFATE 75 MILLIGRAM(S): 75 TABLET, FILM COATED ORAL at 21:33

## 2019-08-19 RX ADMIN — Medication 3 UNIT(S): at 08:46

## 2019-08-19 RX ADMIN — Medication 3 UNIT(S): at 12:28

## 2019-08-19 RX ADMIN — LOSARTAN POTASSIUM 25 MILLIGRAM(S): 100 TABLET, FILM COATED ORAL at 17:18

## 2019-08-19 RX ADMIN — Medication 1 GRAM(S): at 06:15

## 2019-08-19 RX ADMIN — AMIODARONE HYDROCHLORIDE 100 MILLIGRAM(S): 400 TABLET ORAL at 06:15

## 2019-08-19 RX ADMIN — PANTOPRAZOLE SODIUM 40 MILLIGRAM(S): 20 TABLET, DELAYED RELEASE ORAL at 08:31

## 2019-08-19 RX ADMIN — APIXABAN 5 MILLIGRAM(S): 2.5 TABLET, FILM COATED ORAL at 10:09

## 2019-08-19 RX ADMIN — Medication 3 UNIT(S): at 17:18

## 2019-08-19 RX ADMIN — INSULIN GLARGINE 8 UNIT(S): 100 INJECTION, SOLUTION SUBCUTANEOUS at 21:35

## 2019-08-19 NOTE — PROGRESS NOTE ADULT - SUBJECTIVE AND OBJECTIVE BOX
[facility] -- Bolivar Carbajal, Brittany Gallegos Pannella, Patel, Savella  Office # [phone]    Follow Up:      Subjective/Observations:     REVIEW OF SYSTEMS: All other review of systems is negative unless indicated above  PAST MEDICAL & SURGICAL HISTORY:  CAD S/P percutaneous coronary angioplasty  Osteomyelitis: s/p debridement  History of non-ST elevation myocardial infarction (NSTEMI)  Pulmonary embolism  Perforated gastric ulcer: s/p emergent ex-lap omentopexy and plication 6/2019  BPH (benign prostatic hyperplasia)  Hypertension  Afib  Diabetes mellitus with no complication  Diabetes  Traumatic amputation of left foot, initial encounter  Perforated gastric ulcer  H/O abdominal surgery    MEDICATIONS  (STANDING):  amiodarone    Tablet 100 milliGRAM(s) Oral daily  apixaban 5 milliGRAM(s) Oral two times a day  atorvastatin 80 milliGRAM(s) Oral at bedtime  clopidogrel Tablet 75 milliGRAM(s) Oral daily  dextrose 5%. 1000 milliLiter(s) (50 mL/Hr) IV Continuous <Continuous>  dextrose 50% Injectable 12.5 Gram(s) IV Push once  dextrose 50% Injectable 25 Gram(s) IV Push once  dextrose 50% Injectable 25 Gram(s) IV Push once  insulin glargine Injectable (LANTUS) 8 Unit(s) SubCutaneous at bedtime  insulin lispro (HumaLOG) corrective regimen sliding scale   SubCutaneous Before meals and at bedtime  insulin lispro Injectable (HumaLOG) 3 Unit(s) SubCutaneous three times a day before meals  losartan 25 milliGRAM(s) Oral daily  pantoprazole    Tablet 40 milliGRAM(s) Oral before breakfast  sucralfate 1 Gram(s) Oral four times a day  tamsulosin 0.4 milliGRAM(s) Oral at bedtime    MEDICATIONS  (PRN):  acetaminophen   Tablet .. 650 milliGRAM(s) Oral every 6 hours PRN Mild Pain (1 - 3)  dextrose 40% Gel 15 Gram(s) Oral once PRN Blood Glucose LESS THAN 70 milliGRAM(s)/deciliter  glucagon  Injectable 1 milliGRAM(s) IntraMuscular once PRN Glucose LESS THAN 70 milligrams/deciliter  ondansetron Injectable 4 milliGRAM(s) IV Push every 6 hours PRN Nausea and/or Vomiting    Allergies    fish (Hives)  No Known Drug Allergies    Intolerances      Vital Signs Last 24 Hrs  T(C): 36.8 (19 Aug 2019 04:35), Max: 37.1 (18 Aug 2019 20:19)  T(F): 98.2 (19 Aug 2019 04:35), Max: 98.8 (18 Aug 2019 20:19)  HR: 51 (19 Aug 2019 04:35) (51 - 55)  BP: 100/63 (19 Aug 2019 04:35) (100/63 - 147/73)  BP(mean): --  RR: 18 (19 Aug 2019 04:35) (18 - 18)  SpO2: 98% (19 Aug 2019 04:35) (98% - 98%)  I&O's Summary    18 Aug 2019 07:01  -  19 Aug 2019 07:00  --------------------------------------------------------  IN: 1140 mL / OUT: 6350 mL / NET: -5210 mL    19 Aug 2019 07:01  -  19 Aug 2019 12:57  --------------------------------------------------------  IN: 0 mL / OUT: 1200 mL / NET: -1200 mL        PHYSICAL EXAM:  TELE:   Constitutional: NAD, awake and alert, well-developed  HEENT: Moist Mucous Membranes, Anicteric  Pulmonary: Non-labored, breath sounds are clear bilaterally, No wheezing, rales or rhonchi  Cardiovascular: Regular, S1 and S2, No murmurs, rubs, gallops or clicks  Gastrointestinal: Bowel Sounds present, soft, nontender.   Lymph: No peripheral edema. No lymphadenopathy.  Skin: No visible rashes or ulcers.  Psych:  Mood & affect appropriate  LABS: All Labs Reviewed:                        8.4    8.32  )-----------( 302      ( 19 Aug 2019 07:52 )             27.3                         8.5    8.11  )-----------( 339      ( 18 Aug 2019 05:30 )             27.8                         8.4    7.55  )-----------( 354      ( 17 Aug 2019 05:54 )             27.1     19 Aug 2019 07:10    143    |  109    |  14     ----------------------------<  110    4.0     |  27     |  0.96   18 Aug 2019 05:30    146    |  111    |  15     ----------------------------<  126    3.8     |  28     |  0.99   17 Aug 2019 05:54    145    |  107    |  13     ----------------------------<  153    3.8     |  30     |  0.90     Ca    8.5        19 Aug 2019 07:10  Ca    8.4        18 Aug 2019 05:30  Ca    9.0        17 Aug 2019 05:54  Phos  4.7       19 Aug 2019 07:10  Phos  4.0       18 Aug 2019 05:30  Phos  4.3       17 Aug 2019 05:54  Mg     2.1       19 Aug 2019 07:10  Mg     2.1       18 Aug 2019 05:30  Mg     2.1       17 Aug 2019 05:54               [facility]  Cardiology   Spectra #[phone] [facility] -- Bolivar Carbajal, Brittany Gallegos Pannella, Patel, Savella  Office # [phone]    Follow Up:  Afib s/p CVS    Subjective/Observations: Sitting on the chair, comfortable on RA.  c/o mild right sided weakness.  Denies any respiratory or cardiac discomfort.  Denies any form of bleeding    REVIEW OF SYSTEMS: All other review of systems is negative unless indicated above  PAST MEDICAL & SURGICAL HISTORY:  CAD S/P percutaneous coronary angioplasty  Osteomyelitis: s/p debridement  History of non-ST elevation myocardial infarction (NSTEMI)  Pulmonary embolism  Perforated gastric ulcer: s/p emergent ex-lap omentopexy and plication 6/2019  BPH (benign prostatic hyperplasia)  Hypertension  Afib  Diabetes mellitus with no complication  Diabetes  Traumatic amputation of left foot, initial encounter  Perforated gastric ulcer  H/O abdominal surgery    MEDICATIONS  (STANDING):  amiodarone    Tablet 100 milliGRAM(s) Oral daily  apixaban 5 milliGRAM(s) Oral two times a day  atorvastatin 80 milliGRAM(s) Oral at bedtime  clopidogrel Tablet 75 milliGRAM(s) Oral daily  dextrose 5%. 1000 milliLiter(s) (50 mL/Hr) IV Continuous <Continuous>  dextrose 50% Injectable 12.5 Gram(s) IV Push once  dextrose 50% Injectable 25 Gram(s) IV Push once  dextrose 50% Injectable 25 Gram(s) IV Push once  insulin glargine Injectable (LANTUS) 8 Unit(s) SubCutaneous at bedtime  insulin lispro (HumaLOG) corrective regimen sliding scale   SubCutaneous Before meals and at bedtime  insulin lispro Injectable (HumaLOG) 3 Unit(s) SubCutaneous three times a day before meals  losartan 25 milliGRAM(s) Oral daily  pantoprazole    Tablet 40 milliGRAM(s) Oral before breakfast  sucralfate 1 Gram(s) Oral four times a day  tamsulosin 0.4 milliGRAM(s) Oral at bedtime    MEDICATIONS  (PRN):  acetaminophen   Tablet .. 650 milliGRAM(s) Oral every 6 hours PRN Mild Pain (1 - 3)  dextrose 40% Gel 15 Gram(s) Oral once PRN Blood Glucose LESS THAN 70 milliGRAM(s)/deciliter  glucagon  Injectable 1 milliGRAM(s) IntraMuscular once PRN Glucose LESS THAN 70 milligrams/deciliter  ondansetron Injectable 4 milliGRAM(s) IV Push every 6 hours PRN Nausea and/or Vomiting    Allergies    fish (Hives)  No Known Drug Allergies    Intolerances    Vital Signs Last 24 Hrs  T(C): 36.8 (19 Aug 2019 04:35), Max: 37.1 (18 Aug 2019 20:19)  T(F): 98.2 (19 Aug 2019 04:35), Max: 98.8 (18 Aug 2019 20:19)  HR: 51 (19 Aug 2019 04:35) (51 - 55)  BP: 100/63 (19 Aug 2019 04:35) (100/63 - 147/73)  BP(mean): --  RR: 18 (19 Aug 2019 04:35) (18 - 18)  SpO2: 98% (19 Aug 2019 04:35) (98% - 98%)  I&O's Summary    18 Aug 2019 07:01  -  19 Aug 2019 07:00  --------------------------------------------------------  IN: 1140 mL / OUT: 6350 mL / NET: -5210 mL    19 Aug 2019 07:01  -  19 Aug 2019 12:57  --------------------------------------------------------  IN: 0 mL / OUT: 1200 mL / NET: -1200 mL     PHYSICAL EXAM:  TELE: Not on tele  Constitutional: NAD, awake and alert, obese  HEENT: Moist Mucous Membranes, Anicteric  Pulmonary: Non-labored, breath sounds are diminished bilaterally, No wheezing, rales or rhonchi  Cardiovascular: Regular, S1 and S2, No murmurs, rubs, gallops or clicks  Gastrointestinal: Bowel Sounds present, soft, nontender.   Extr: Right hemiparesis  Lymph: No peripheral edema. No lymphadenopathy.  Skin: No visible rashes or ulcers.  Psych:  Mood & affect appropriate  LABS: All Labs Reviewed:                        8.4    8.32  )-----------( 302      ( 19 Aug 2019 07:52 )             27.3                         8.5    8.11  )-----------( 339      ( 18 Aug 2019 05:30 )             27.8                         8.4    7.55  )-----------( 354      ( 17 Aug 2019 05:54 )             27.1     19 Aug 2019 07:10    143    |  109    |  14     ----------------------------<  110    4.0     |  27     |  0.96   18 Aug 2019 05:30    146    |  111    |  15     ----------------------------<  126    3.8     |  28     |  0.99   17 Aug 2019 05:54    145    |  107    |  13     ----------------------------<  153    3.8     |  30     |  0.90     Ca    8.5        19 Aug 2019 07:10  Ca    8.4        18 Aug 2019 05:30  Ca    9.0        17 Aug 2019 05:54  Phos  4.7       19 Aug 2019 07:10  Phos  4.0       18 Aug 2019 05:30  Phos  4.3       17 Aug 2019 05:54  Mg     2.1       19 Aug 2019 07:10  Mg     2.1       18 Aug 2019 05:30  Mg     2.1       17 Aug 2019 05:54    < from: TTE Echo Doppler w/o Cont (08.15.19 @ 16:33) >     EXAM:  ECHO TTE WO CON COMP W DOPPLR         PROCEDURE DATE:  08/15/2019        INTERPRETATION:  INDICATION: Atrial fibrillation    Blood Pressure 128/64    Height 187.9 cm     Weight 91 kg       BSA 2.18   sq m    Dimensions:    LA 3.7       Normal Values: 2.0 - 4.0 cm    Ao 3.6        Normal Values: 2.0 - 3.8 cm  SEPTUM 1.6       Normal Values: 0.6 - 1.2 cm  PWT 1.4       Normal Values: 0.6 - 1.1 cm  LVIDd 5.3         Normal Values: 3.0 - 5.6 cm  LVIDs 2.7         Normal Values: 1.8 - 4.0 cm      OBSERVATIONS:  Technically difficult study  Mitral Valve: normal, mild to moderate MR.  Aortic Valve/Aorta: Aortic valve is not well-visualized, likely normal   function.  Tricuspid Valve: normal with trace TR.  Pulmonic Valve: normal  Left Atrium: Enlarged  Right Atrium: normal  Left Ventricle: normal LV size and systolic function, estimated LVEF of   65%. Left ventricular hypertrophy  Right Ventricle: Grossly normal size and systolic function.  Pericardium/Pleura: normal, no significant pericardial effusion.  LV Diastolic Function: normal    Conclusion:   Technically difficult study  Normal left ventricular internal dimensions and systolic function,   estimated LVEF of 65%.   Grossly normal RV size and systolic function.   Left atrial enlargement  Aortic valve is not well-visualized, likely normal function.   Mild to moderate MR   Trace TR.    No significant pericardial effusion.    MIROSLAVA CASTAÑEDA   This document has been electronically signed. Aug 16 2019  8:25AM    < end of copied text >    < from: Xray Chest 1 View AP/PA (08.14.19 @ 21:46) >    EXAM:  XR CHEST AP OR PA 1V                            PROCEDURE DATE:  08/14/2019      INTERPRETATION:  Clinical information: Code stroke.    Technique: Frontal view of the chest.    Comparison: Prior chest x-ray examination from 7/30/2019.    Findings: The lungs are clear. The cardiomediastinal silhouette is   normal. There are mild multilevel degenerative changes of the thoracic   spine.    IMPRESSION: Clear lungs, unchanged.    NICOLAS LAI M.D., ATTENDING RADIOLOGIST  This document has been electronically signed. Aug 15 2019  7:53AM     < end of copied text >    < from: MR Head No Cont (08.15.19 @ 17:01) >    EXAM:  MR BRAIN                            PROCEDURE DATE:  08/15/2019          INTERPRETATION:  Exam Date: 8/15/2019 5:01 PM    MR brain  without gadolinium     CLINICAL INFORMATION:  aneurysm    TECHNIQUE: Multiplanar imaging of the brain was performed.    COMPARISON: CTA 8/14/2019    FINDINGS:     No abnormal signal within the brain parenchyma.  There is no evidence of   acute infarct, hemorrhage or mass lesion. There is no midline shift or   herniation pattern. No mass effect is found in the brain.    The ventricles, sulci and basal cisterns appear unremarkable.    The vertebral and internal carotid arteries demonstrate expected flow   voids indicating their patency.    The paranasal sinuses are clear.    There is fluid in the left mastoid air cells.    IMPRESSION:   Normal MRI of the brain. No acute infarct. Small amount of   fluid in the left mastoid air cells.     ELMER LOGAN M.D., ATTENDING RADIOLOGIST  This document has been electronically signed. Aug 15 2019  6:23PM     < end of copied text >      < from: 12 Lead ECG (08.14.19 @ 21:48) >    Ventricular Rate 74 BPM    Atrial Rate 74 BPM    P-R Interval 136 ms    QRS Duration 72 ms    Q-T Interval 372 ms    QTC Calculation(Bezet) 412 ms    P Axis 66 degrees    R Axis 18 degrees    T Axis 50 degrees    Diagnosis Line Normal sinus rhythm  Anterior infarct , age undetermined  Nonspecific ST abnormality  Abnormal ECG    Confirmed by darien France ([id number]) on 8/15/2019 3:34:50 PM    < end of copied text > [facility] -- Bolivar Carbajal, Brittany Gallegos Pannella, Patel, Savella  Office # [phone]    Follow Up:  Afib s/p CVS    Subjective/Observations: Sitting on the chair, comfortable on RA.  c/o mild right sided weakness.  Denies any respiratory or cardiac discomfort.  Denies any form of bleeding    REVIEW OF SYSTEMS: All other review of systems is negative unless indicated above  PAST MEDICAL & SURGICAL HISTORY:  CAD S/P percutaneous coronary angioplasty  Osteomyelitis: s/p debridement  History of non-ST elevation myocardial infarction (NSTEMI)  Pulmonary embolism  Perforated gastric ulcer: s/p emergent ex-lap omentopexy and plication 6/2019  BPH (benign prostatic hyperplasia)  Hypertension  Afib  Diabetes mellitus with no complication  Diabetes  Traumatic amputation of left foot, initial encounter  Perforated gastric ulcer  H/O abdominal surgery    MEDICATIONS  (STANDING):  amiodarone    Tablet 100 milliGRAM(s) Oral daily  apixaban 5 milliGRAM(s) Oral two times a day  atorvastatin 80 milliGRAM(s) Oral at bedtime  clopidogrel Tablet 75 milliGRAM(s) Oral daily  dextrose 5%. 1000 milliLiter(s) (50 mL/Hr) IV Continuous <Continuous>  dextrose 50% Injectable 12.5 Gram(s) IV Push once  dextrose 50% Injectable 25 Gram(s) IV Push once  dextrose 50% Injectable 25 Gram(s) IV Push once  insulin glargine Injectable (LANTUS) 8 Unit(s) SubCutaneous at bedtime  insulin lispro (HumaLOG) corrective regimen sliding scale   SubCutaneous Before meals and at bedtime  insulin lispro Injectable (HumaLOG) 3 Unit(s) SubCutaneous three times a day before meals  losartan 25 milliGRAM(s) Oral daily  pantoprazole    Tablet 40 milliGRAM(s) Oral before breakfast  sucralfate 1 Gram(s) Oral four times a day  tamsulosin 0.4 milliGRAM(s) Oral at bedtime    MEDICATIONS  (PRN):  acetaminophen   Tablet .. 650 milliGRAM(s) Oral every 6 hours PRN Mild Pain (1 - 3)  dextrose 40% Gel 15 Gram(s) Oral once PRN Blood Glucose LESS THAN 70 milliGRAM(s)/deciliter  glucagon  Injectable 1 milliGRAM(s) IntraMuscular once PRN Glucose LESS THAN 70 milligrams/deciliter  ondansetron Injectable 4 milliGRAM(s) IV Push every 6 hours PRN Nausea and/or Vomiting    Allergies    fish (Hives)  No Known Drug Allergies    Intolerances    Vital Signs Last 24 Hrs  T(C): 36.8 (19 Aug 2019 04:35), Max: 37.1 (18 Aug 2019 20:19)  T(F): 98.2 (19 Aug 2019 04:35), Max: 98.8 (18 Aug 2019 20:19)  HR: 51 (19 Aug 2019 04:35) (51 - 55)  BP: 100/63 (19 Aug 2019 04:35) (100/63 - 147/73)  BP(mean): --  RR: 18 (19 Aug 2019 04:35) (18 - 18)  SpO2: 98% (19 Aug 2019 04:35) (98% - 98%)  I&O's Summary    18 Aug 2019 07:01  -  19 Aug 2019 07:00  --------------------------------------------------------  IN: 1140 mL / OUT: 6350 mL / NET: -5210 mL    19 Aug 2019 07:01  -  19 Aug 2019 12:57  --------------------------------------------------------  IN: 0 mL / OUT: 1200 mL / NET: -1200 mL     PHYSICAL EXAM:  TELE:    Constitutional: NAD, awake and alert, obese  HEENT: Moist Mucous Membranes, Anicteric  Pulmonary: Decreased breath sounds b/l. No rales, crackles or wheeze appreciated.   Cardiovascular: Regular, S1 and S2, No murmurs, rubs, gallops or clicks  Gastrointestinal: Bowel Sounds present, soft, nontender.   Extr: Right hemiparesis  Lymph: No peripheral edema. No lymphadenopathy.  Skin: No visible rashes or ulcers.  Psych:  Mood & affect appropriate  LABS: All Labs Reviewed:                        8.4    8.32  )-----------( 302      ( 19 Aug 2019 07:52 )             27.3                         8.5    8.11  )-----------( 339      ( 18 Aug 2019 05:30 )             27.8                         8.4    7.55  )-----------( 354      ( 17 Aug 2019 05:54 )             27.1     19 Aug 2019 07:10    143    |  109    |  14     ----------------------------<  110    4.0     |  27     |  0.96   18 Aug 2019 05:30    146    |  111    |  15     ----------------------------<  126    3.8     |  28     |  0.99   17 Aug 2019 05:54    145    |  107    |  13     ----------------------------<  153    3.8     |  30     |  0.90     Ca    8.5        19 Aug 2019 07:10  Ca    8.4        18 Aug 2019 05:30  Ca    9.0        17 Aug 2019 05:54  Phos  4.7       19 Aug 2019 07:10  Phos  4.0       18 Aug 2019 05:30  Phos  4.3       17 Aug 2019 05:54  Mg     2.1       19 Aug 2019 07:10  Mg     2.1       18 Aug 2019 05:30  Mg     2.1       17 Aug 2019 05:54    < from: TTE Echo Doppler w/o Cont (08.15.19 @ 16:33) >     EXAM:  ECHO TTE WO CON COMP W DOPPLR         PROCEDURE DATE:  08/15/2019        INTERPRETATION:  INDICATION: Atrial fibrillation    Blood Pressure 128/64    Height 187.9 cm     Weight 91 kg       BSA 2.18   sq m    Dimensions:    LA 3.7       Normal Values: 2.0 - 4.0 cm    Ao 3.6        Normal Values: 2.0 - 3.8 cm  SEPTUM 1.6       Normal Values: 0.6 - 1.2 cm  PWT 1.4       Normal Values: 0.6 - 1.1 cm  LVIDd 5.3         Normal Values: 3.0 - 5.6 cm  LVIDs 2.7         Normal Values: 1.8 - 4.0 cm      OBSERVATIONS:  Technically difficult study  Mitral Valve: normal, mild to moderate MR.  Aortic Valve/Aorta: Aortic valve is not well-visualized, likely normal   function.  Tricuspid Valve: normal with trace TR.  Pulmonic Valve: normal  Left Atrium: Enlarged  Right Atrium: normal  Left Ventricle: normal LV size and systolic function, estimated LVEF of   65%. Left ventricular hypertrophy  Right Ventricle: Grossly normal size and systolic function.  Pericardium/Pleura: normal, no significant pericardial effusion.  LV Diastolic Function: normal    Conclusion:   Technically difficult study  Normal left ventricular internal dimensions and systolic function,   estimated LVEF of 65%.   Grossly normal RV size and systolic function.   Left atrial enlargement  Aortic valve is not well-visualized, likely normal function.   Mild to moderate MR   Trace TR.    No significant pericardial effusion.    MIROSLAVA CASTAÑEDA   This document has been electronically signed. Aug 16 2019  8:25AM    < end of copied text >    < from: Xray Chest 1 View AP/PA (08.14.19 @ 21:46) >    EXAM:  XR CHEST AP OR PA 1V                            PROCEDURE DATE:  08/14/2019      INTERPRETATION:  Clinical information: Code stroke.    Technique: Frontal view of the chest.    Comparison: Prior chest x-ray examination from 7/30/2019.    Findings: The lungs are clear. The cardiomediastinal silhouette is   normal. There are mild multilevel degenerative changes of the thoracic   spine.    IMPRESSION: Clear lungs, unchanged.    NICOLAS LAI M.D., ATTENDING RADIOLOGIST  This document has been electronically signed. Aug 15 2019  7:53AM     < end of copied text >    < from: MR Head No Cont (08.15.19 @ 17:01) >    EXAM:  MR BRAIN                            PROCEDURE DATE:  08/15/2019          INTERPRETATION:  Exam Date: 8/15/2019 5:01 PM    MR brain  without gadolinium     CLINICAL INFORMATION:  aneurysm    TECHNIQUE: Multiplanar imaging of the brain was performed.    COMPARISON: CTA 8/14/2019    FINDINGS:     No abnormal signal within the brain parenchyma.  There is no evidence of   acute infarct, hemorrhage or mass lesion. There is no midline shift or   herniation pattern. No mass effect is found in the brain.    The ventricles, sulci and basal cisterns appear unremarkable.    The vertebral and internal carotid arteries demonstrate expected flow   voids indicating their patency.    The paranasal sinuses are clear.    There is fluid in the left mastoid air cells.    IMPRESSION:   Normal MRI of the brain. No acute infarct. Small amount of   fluid in the left mastoid air cells.     ELMER LOGAN M.D., ATTENDING RADIOLOGIST  This document has been electronically signed. Aug 15 2019  6:23PM     < end of copied text >      < from: 12 Lead ECG (08.14.19 @ 21:48) >    Ventricular Rate 74 BPM    Atrial Rate 74 BPM    P-R Interval 136 ms    QRS Duration 72 ms    Q-T Interval 372 ms    QTC Calculation(Bezet) 412 ms    P Axis 66 degrees    R Axis 18 degrees    T Axis 50 degrees    Diagnosis Line Normal sinus rhythm  Anterior infarct , age undetermined  Nonspecific ST abnormality  Abnormal ECG    Confirmed by darien France ([id number]) on 8/15/2019 3:34:50 PM    < end of copied text >

## 2019-08-19 NOTE — PROGRESS NOTE ADULT - ASSESSMENT
49 M with PAF on Eliquis, RUL subsegmental PE, hx of recent NSTEMI, perforated antral ulcer, osteomyelitis s/p debridement, HTN and DM.  He was recently admitted with chest pain and is s/p cath with mild LAD disease, 90% RCA and 100% RPL. He is now s/p BMS to RCA on 8/1/2019  He now presents with acute right sided weakness and slurred speech and is s/p tpa.    CAD  - Stable CAD  - He is s/p BMS on 8/1/2019. He is now s/p 1 week course of triple therapy, and should remain on Plavix and full dose a/c.  - Continue Plavix   - Continue ARB  - Continue statin    Paroxysmal Afib  - Remains in NSR while on telemetry, with no additional AF.   - Continue Amiodarone with hold parameters, 100mg daily  - BB had been held in the setting of bradycardia.  Continue to hold  - Now back on Eliquis.  Monitor for s/s bleeding   - Euvolemic on exam. Can hold po Lasix temporarily.   - Watch creatinine and electrolytes. Keep K>4, Mg>2    Acute CVA  - s/p TPA with very mild residual right sided weakness  - Continue Plavix and statin    - Will follow with you.  - Awaiting D/C to [facility] ([place], [place])  Cardiology   Spectra #[phone]

## 2019-08-19 NOTE — PROGRESS NOTE ADULT - SUBJECTIVE AND OBJECTIVE BOX
Neurology Follow up note    SARAH MORRISON49yMale    HPI:  49M with PMH CAD s/p BMS to RCA (8/2019), RUL subsegmental PE (6/2019, on Eliquis), hx of NSTEMI and PAF s/p ex-lap omentopexy and plication for perforated antral ulcer (5/2019), osteomyelitis s/p debridement,  HTN and DM2 (not on insulin) presenting to the ED after having slurred speech and R sided weakness.   Patient states around 6pm-6:30pm he was sitting at table with friends when started having slurred speech and R sided arm and LE weakness, numbness and tingling.  Also felt confused and disoriented at times, associated with some blurry vision.  Patient waited about 2 hours before presenting to ED for persistent symptoms.  In the ED, received TPA for CVA.  Patient states currently still feels numbness/ tingling (pins and needles) in RUE and RLE however blurry vision comes and goes s/p TPA in the ER. Denies chest pain, palpitations, headache, n/v, abd pain, urinary or bowel incontinence.  Denies lightheadedness/ dizziness, no fall or LOC.  Patient does admit to some SOB when symptoms started, now resolved.  Admits to significant weakness on R side not baseline.  Of note patient recently admitted to [place] and transferred to [place] on 7/31/2019 for chest pain, had cardiac cath done showed mild LAD disease, 90% RCA and 100% RPL, had BMS to RCA placed.  His baby asa was stopped but was started on plavix and Eliquis on discharge 8/9/19, since discharge patient has not had Eliquis because not covered by insurance (only was taking plavix).  Patient states he has family history of stroke on both sides of family, mother had some genetic coagulopathy (Factor V Leiden sounded familiar but not sure), however he was tested for genetic coagulopathies in 5/2011 and was negative.    In the ED, T 99.6F, HR 80, /74, RR 15, SpO2 97% on RA.  Labs significant for H/H 9.4/31.1 (baseline around 9.5-10.5), PT/INR 11.9/1.05, glucose 296.  Received TPA in the ED.    EKG: NSR  Noncontrast head CT: Stable exam. No acute intracranial hemorrhage or mass effect vasogenic edema.  CTA neck: No major vessel occlusion, hemodynamically significant stenosis according   to the NASCET criteria or dissection.  CTA head: No major vessel occlusion or hemodynamically significant stenosis. 3.7 x   4.1 x 5.4 mm lobulated right MCA bifurcation aneurysm. (14 Aug 2019 23:03)      Interval History - no new weakness    Patient is seen, chart was reviewed and case was discussed with the treatment team.  Pt is not in any distress.   Lying on bed comfortably.   No events reported overnight.   No clinical seizure was reported.  Sitting on chair bed comfortably.    is at bedside.    Vital Signs Last 24 Hrs  T(C): 36.8 (19 Aug 2019 04:35), Max: 37.1 (18 Aug 2019 20:19)  T(F): 98.2 (19 Aug 2019 04:35), Max: 98.8 (18 Aug 2019 20:19)  HR: 51 (19 Aug 2019 04:35) (51 - 55)  BP: 100/63 (19 Aug 2019 04:35) (100/63 - 147/73)  BP(mean): --  RR: 18 (19 Aug 2019 04:35) (18 - 18)  SpO2: 98% (19 Aug 2019 04:35) (98% - 98%)        REVIEW OF SYSTEMS:    Constitutional: No fever, weight loss or fatigue  Eyes: No eye pain, visual disturbances, or discharge  ENT:  No difficulty hearing, tinnitus, vertigo; No sinus or throat pain  Neck: No pain or stiffness  Respiratory: No cough, wheezing, chills or hemoptysis  Cardiovascular: No chest pain, palpitations, shortness of breath, dizziness or leg swelling  Gastrointestinal: No abdominal or epigastric pain. No nausea, vomiting or hematemesis;  Genitourinary: No dysuria, frequency, hematuria or incontinence  Neurological: No headaches, memory loss, loss of strength, numbness or tremors  Psychiatric: No depression, anxiety, mood swings or difficulty sleeping  Musculoskeletal: No joint pain or swelling;   Skin: No itching, burning, rashes or lesions   Lymph Nodes: No enlarged glands  Endocrine: No heat or cold intolerance;   Allergy and Immunologic: No hives or eczema    On Neurological Examination:    Mental Status - Pt is alert, awake, oriented X3. Follows commands well and able to answer questions appropriately  .Mood and affect  normal    Speech -  Normal.     Cranial Nerves - Pupils 3 mm equal and reactive to light, extraocular eye movements intact.   Pt has no  facial asymmetry. Facial sensation is intact.Tongue - is in midline.    Muscle tone - is normal all over.    Motor Exam - ? mild right hemiparesis.    Sensory Exam - Pt withdraws all extremities equally on stimulation. No asymmetry seen.         coordination:    Finger to nose: normal      Deep tendon Reflexes - 2 plus all over.     .    Neck Supple -  Yes.     MEDICATIONS    acetaminophen   Tablet .. 650 milliGRAM(s) Oral every 6 hours PRN  amiodarone    Tablet 100 milliGRAM(s) Oral daily  apixaban 5 milliGRAM(s) Oral two times a day  atorvastatin 80 milliGRAM(s) Oral at bedtime  clopidogrel Tablet 75 milliGRAM(s) Oral daily  dextrose 40% Gel 15 Gram(s) Oral once PRN  dextrose 5%. 1000 milliLiter(s) IV Continuous <Continuous>  dextrose 50% Injectable 12.5 Gram(s) IV Push once  dextrose 50% Injectable 25 Gram(s) IV Push once  dextrose 50% Injectable 25 Gram(s) IV Push once  glucagon  Injectable 1 milliGRAM(s) IntraMuscular once PRN  insulin glargine Injectable (LANTUS) 8 Unit(s) SubCutaneous at bedtime  insulin lispro (HumaLOG) corrective regimen sliding scale   SubCutaneous Before meals and at bedtime  insulin lispro Injectable (HumaLOG) 3 Unit(s) SubCutaneous three times a day before meals  losartan 25 milliGRAM(s) Oral daily  ondansetron Injectable 4 milliGRAM(s) IV Push every 6 hours PRN  pantoprazole    Tablet 40 milliGRAM(s) Oral before breakfast  sucralfate 1 Gram(s) Oral four times a day  tamsulosin 0.4 milliGRAM(s) Oral at bedtime      Allergies    fish (Hives)  No Known Drug Allergies    Intolerances        LABS:  CBC Full  -  ( 19 Aug 2019 07:52 )  WBC Count : 8.32 K/uL  RBC Count : 3.18 M/uL  Hemoglobin : 8.4 g/dL  Hematocrit : 27.3 %  Platelet Count - Automated : 302 K/uL  Mean Cell Volume : 85.8 fl    Auto Lymphocyte % : x  Auto Monocyte % : x  Auto Eosinophil % : x  Auto Basophil % : x      08-19    143  |  109<H>  |  14  ----------------------------<  110<H>  4.0   |  27  |  0.96    Ca    8.5      19 Aug 2019 07:10  Phos  4.7     08-19  Mg     2.1     08-19      Hemoglobin A1C:     Vitamin B12     RADIOLOGY    ASSESSMENT AND PLAN:      presented with right sided weakness/slurred speech probably cva  sp iv tpa.  AF.    no further neuro work up.  continue ac/statin  neuro wise stable for rehab  Physical therapy evaluation.  Pain is accessed and addressed.  Would follow up prn.

## 2019-08-19 NOTE — ADVANCED PRACTICE NURSE CONSULT - ASSESSMENT
Vital Signs Last 24 Hrs  T(C): 36.2 (19 Aug 2019 12:56), Max: 37.1 (18 Aug 2019 20:19)  T(F): 97.2 (19 Aug 2019 12:56), Max: 98.8 (18 Aug 2019 20:19)  HR: 58 (19 Aug 2019 14:20) (51 - 58)  BP: 133/79 (19 Aug 2019 14:20) (100/63 - 133/79)  BP(mean): --  RR: 17 (19 Aug 2019 12:56) (17 - 18)  SpO2: 98% (19 Aug 2019 12:56) (98% - 98%)    Hemoglobin A1C, Whole Blood: 8.3 % (08-01-19 @ 08:37)  Hemoglobin A1C, Whole Blood: 8.0 % (05-12-19 @ 11:07)  Hemoglobin A1C, Whole Blood: 8.0 % (05-11-19 @ 11:43)   eGFR if Non African American: 91 mL/min/1.73M2 (08-19-19 @ 07:10)  eGFR if : 106 mL/min/1.73M2 (08-19-19 @ 07:10)  eGFR if Non African American: 88 mL/min/1.73M2 (08-18-19 @ 05:30)  eGFR if African American: 102 mL/min/1.73M2 (08-18-19 @ 05:30)  eGFR if Non African American: 99 mL/min/1.73M2 (08-17-19 @ 05:54)  eGFR if African American: 114 mL/min/1.73M2 (08-17-19 @ 05:54)      CAPILLARY BLOOD GLUCOSE      POCT Blood Glucose.: 139 mg/dL (19 Aug 2019 11:55)  POCT Blood Glucose.: 114 mg/dL (19 Aug 2019 08:26)  POCT Blood Glucose.: 297 mg/dL (18 Aug 2019 22:25)  POCT Blood Glucose.: 284 mg/dL (18 Aug 2019 21:12)  POCT Blood Glucose.: 156 mg/dL (18 Aug 2019 17:00)      DIET: CC  %

## 2019-08-19 NOTE — ADVANCED PRACTICE NURSE CONSULT - REASON FOR CONSULT
49y    Male    Patient is a 49y old  Male who presents with a chief complaint of CVA (19 Aug 2019 12:57)      Type2: DX year known complications: TIA,  Endocrine Last seen Rx home Hx DKA/HHS, Glucometer checks, needs, weight, diet, exercise  Hx ASCVD-CAD, NSTEMI, stent July 2019, No CKD, No HF    HPI:  49M with PMH CAD s/p BMS to RCA (8/2019), RUL subsegmental PE (6/2019, on Eliquis), hx of NSTEMI and PAF s/p ex-lap omentopexy and plication for perforated antral ulcer (5/2019), osteomyelitis s/p debridement,  HTN and DM2 (not on insulin) presenting to the ED after having slurred speech and R sided weakness.   Patient states around 6pm-6:30pm he was sitting at table with friends when started having slurred speech and R sided arm and LE weakness, numbness and tingling.  Also felt confused and disoriented at times, associated with some blurry vision.  Patient waited about 2 hours before presenting to ED for persistent symptoms.  In the ED, received TPA for CVA.  Patient states currently still feels numbness/ tingling (pins and needles) in RUE and RLE however blurry vision comes and goes s/p TPA in the ER. Denies chest pain, palpitations, headache, n/v, abd pain, urinary or bowel incontinence.  Denies lightheadedness/ dizziness, no fall or LOC.  Patient does admit to some SOB when symptoms started, now resolved.  Admits to significant weakness on R side not baseline.  Of note patient recently admitted to [place] and transferred to [place] on 7/31/2019 for chest pain, had cardiac cath done showed mild LAD disease, 90% RCA and 100% RPL, had BMS to RCA placed.  His baby asa was stopped but was started on plavix and Eliquis on discharge 8/9/19, since discharge patient has not had Eliquis because not covered by insurance (only was taking plavix).  Patient states he has family history of stroke on both sides of family, mother had some genetic coagulopathy (Factor V Leiden sounded familiar but not sure), however he was tested for genetic coagulopathies in 5/2011 and was negative.    In the ED, T 99.6F, HR 80, /74, RR 15, SpO2 97% on RA.  Labs significant for H/H 9.4/31.1 (baseline around 9.5-10.5), PT/INR 11.9/1.05, glucose 296.  Received TPA in the ED.    EKG: NSR  Noncontrast head CT: Stable exam. No acute intracranial hemorrhage or mass effect vasogenic edema.  CTA neck: No major vessel occlusion, hemodynamically significant stenosis according   to the NASCET criteria or dissection.  CTA head: No major vessel occlusion or hemodynamically significant stenosis. 3.7 x   4.1 x 5.4 mm lobulated right MCA bifurcation aneurysm. (14 Aug 2019 23:03)      PAST MEDICAL & SURGICAL HISTORY:  CAD S/P percutaneous coronary angioplasty  Osteomyelitis: s/p debridement  History of non-ST elevation myocardial infarction (NSTEMI)  Pulmonary embolism  Perforated gastric ulcer: s/p emergent ex-lap omentopexy and plication 6/2019  BPH (benign prostatic hyperplasia)  Hypertension  Afib  Diabetes mellitus with no complication  Diabetes  Traumatic amputation of left foot, initial encounter  Perforated gastric ulcer  H/O abdominal surgery      MEDICATIONS  (STANDING):  amiodarone    Tablet 100 milliGRAM(s) Oral daily  apixaban 5 milliGRAM(s) Oral two times a day  atorvastatin 80 milliGRAM(s) Oral at bedtime  clopidogrel Tablet 75 milliGRAM(s) Oral daily  dextrose 5%. 1000 milliLiter(s) (50 mL/Hr) IV Continuous <Continuous>  dextrose 50% Injectable 12.5 Gram(s) IV Push once  dextrose 50% Injectable 25 Gram(s) IV Push once  dextrose 50% Injectable 25 Gram(s) IV Push once  insulin glargine Injectable (LANTUS) 8 Unit(s) SubCutaneous at bedtime  insulin lispro (HumaLOG) corrective regimen sliding scale   SubCutaneous Before meals and at bedtime  insulin lispro Injectable (HumaLOG) 3 Unit(s) SubCutaneous three times a day before meals  losartan 25 milliGRAM(s) Oral daily  pantoprazole    Tablet 40 milliGRAM(s) Oral before breakfast  sucralfate 1 Gram(s) Oral four times a day  tamsulosin 0.4 milliGRAM(s) Oral at bedtime 49y    Male    Patient is a 49y old  Male who presents with a chief complaint of CVA (19 Aug 2019 12:57)      Type2: DX April 2007. known complications: TIA,  No Endocrine, PCP [facility] Last seen; 6mos-1 year. Rx home metformin, Lantus 30-40 units HS last adm. 3-4 years ago. No Hx DKA/HHS, Glucometer checks- none has old meter, needs meter/supplies and insulin therapy, weight lost 30 lbs since April, diet- poor as per patient, exercise- walks 1/2 daily. Hx ASCVD-CAD, NSTEMI, stent July 2019, No CKD, No HF.     HPI:  49M with PMH CAD s/p BMS to RCA (8/2019), RUL subsegmental PE (6/2019, on Eliquis), hx of NSTEMI and PAF s/p ex-lap omentopexy and plication for perforated antral ulcer (5/2019), osteomyelitis s/p debridement,  HTN and DM2 (not on insulin) presenting to the ED after having slurred speech and R sided weakness.   Patient states around 6pm-6:30pm he was sitting at table with friends when started having slurred speech and R sided arm and LE weakness, numbness and tingling.  Also felt confused and disoriented at times, associated with some blurry vision.  Patient waited about 2 hours before presenting to ED for persistent symptoms.  In the ED, received TPA for CVA.  Patient states currently still feels numbness/ tingling (pins and needles) in RUE and RLE however blurry vision comes and goes s/p TPA in the ER. Denies chest pain, palpitations, headache, n/v, abd pain, urinary or bowel incontinence.  Denies lightheadedness/ dizziness, no fall or LOC.  Patient does admit to some SOB when symptoms started, now resolved.  Admits to significant weakness on R side not baseline.  Of note patient recently admitted to [place] and transferred to [place] on 7/31/2019 for chest pain, had cardiac cath done showed mild LAD disease, 90% RCA and 100% RPL, had BMS to RCA placed.  His baby asa was stopped but was started on plavix and Eliquis on discharge 8/9/19, since discharge patient has not had Eliquis because not covered by insurance (only was taking plavix).  Patient states he has family history of stroke on both sides of family, mother had some genetic coagulopathy (Factor V Leiden sounded familiar but not sure), however he was tested for genetic coagulopathies in 5/2011 and was negative.    In the ED, T 99.6F, HR 80, /74, RR 15, SpO2 97% on RA.  Labs significant for H/H 9.4/31.1 (baseline around 9.5-10.5), PT/INR 11.9/1.05, glucose 296.  Received TPA in the ED.    EKG: NSR  Noncontrast head CT: Stable exam. No acute intracranial hemorrhage or mass effect vasogenic edema.  CTA neck: No major vessel occlusion, hemodynamically significant stenosis according   to the NASCET criteria or dissection.  CTA head: No major vessel occlusion or hemodynamically significant stenosis. 3.7 x   4.1 x 5.4 mm lobulated right MCA bifurcation aneurysm. (14 Aug 2019 23:03)      PAST MEDICAL & SURGICAL HISTORY:  CAD S/P percutaneous coronary angioplasty  Osteomyelitis: s/p debridement  History of non-ST elevation myocardial infarction (NSTEMI)  Pulmonary embolism  Perforated gastric ulcer: s/p emergent ex-lap omentopexy and plication 6/2019  BPH (benign prostatic hyperplasia)  Hypertension  Afib  Diabetes mellitus with no complication  Diabetes  Traumatic amputation of left foot, initial encounter  Perforated gastric ulcer  H/O abdominal surgery      MEDICATIONS  (STANDING):  amiodarone    Tablet 100 milliGRAM(s) Oral daily  apixaban 5 milliGRAM(s) Oral two times a day  atorvastatin 80 milliGRAM(s) Oral at bedtime  clopidogrel Tablet 75 milliGRAM(s) Oral daily  dextrose 5%. 1000 milliLiter(s) (50 mL/Hr) IV Continuous <Continuous>  dextrose 50% Injectable 12.5 Gram(s) IV Push once  dextrose 50% Injectable 25 Gram(s) IV Push once  dextrose 50% Injectable 25 Gram(s) IV Push once  insulin glargine Injectable (LANTUS) 8 Unit(s) SubCutaneous at bedtime  insulin lispro (HumaLOG) corrective regimen sliding scale   SubCutaneous Before meals and at bedtime  insulin lispro Injectable (HumaLOG) 3 Unit(s) SubCutaneous three times a day before meals  losartan 25 milliGRAM(s) Oral daily  pantoprazole    Tablet 40 milliGRAM(s) Oral before breakfast  sucralfate 1 Gram(s) Oral four times a day  tamsulosin 0.4 milliGRAM(s) Oral at bedtime

## 2019-08-19 NOTE — CHART NOTE - NSCHARTNOTEFT_GEN_A_CORE
RN called as patient had 3 beats of Vtach and HR down to 40s.  Patient lying comfortably in bed, sleeping.  HR low likely as patient is asleep. Will order bmp, mag, phos for morning labs for Vtach.

## 2019-08-19 NOTE — ADVANCED PRACTICE NURSE CONSULT - RECOMMEDATIONS
Type 2  A1c 8.3 % s/p TIA  Recommend endocrine  FU appt  Diabetes wellness program accepted  Diabetes support group info given  Goal 100-180 mg/dL Type 2  A1c 8.3 % s/p TIA  Recommend endocrine consult- [facility] to see tomorrow  FU appt-TBA  Diabetes wellness program accepted  Diabetes support group info given  Goal 100-180 mg/dL Type 2  A1c 8.3 % s/p TIA  Recommend endocrine consult- [facility] to see tomorrow  FU appt-TBA, plan to [facility] to rehab  Diabetes wellness program accepted/referred  Diabetes support group info given  Goal 100-180 mg/dL

## 2019-08-19 NOTE — PROGRESS NOTE ADULT - PROBLEM SELECTOR PLAN 10
-s/p TPA in ED, on Eliquis 5mg BID at home, will continue Eliquis and Plavix    IMPROVE VTE Individual Risk Assessment  RISK                                                                Points  [X  ] Previous VTE                                                  3  [ X ] Thrombophilia                                               2  [  ] Lower limb paralysis                                      2        (unable to hold up >15 seconds)    [  ] Current Cancer                                              2         (within 6 months)  [  ] Immobilization > 24 hrs                                1  [  ] ICU/CCU stay > 24 hours                              1  [  ] Age > 60                                                      1  IMPROVE VTE Score ____5_____  IMPROVE Score 0-1: Low Risk, No VTE prophylaxis required for most patients, encourage ambulation.   IMPROVE Score 2-3: At risk, pharmacologic VTE prophylaxis is indicated for most patients (in the absence of a contraindication)  IMPROVE Score > or = 4: High Risk, pharmacologic VTE prophylaxis is indicated for most patients (in the absence of a contraindication) -s/p TPA in ED, on Eliquis 5mg BID at home, will continue Eliquis and Plavix    IMPROVE VTE Individual Risk Assessment  RISK                                                                Points  [X  ] Previous VTE                                                  3  [ X ] Thrombophilia                                               2  [  ] Lower limb paralysis                                      2        (unable to hold up >15 seconds)    [  ] Current Cancer                                              2         (within 6 months)  [  ] Immobilization > 24 hrs                                1  [  ] ICU/CCU stay > 24 hours                              1  [  ] Age > 60                                                      1  IMPROVE VTE Score ____5____    11.  Abnormal CT of the Head  -Incidentally found to have 3.7 x 4.1 x 5.4 mm lobulated right MCA bifurcation aneurysm.   No major vessel occlusion or hemodynamically significant stenosis.   -f/u outpatient, no acute intervention

## 2019-08-19 NOTE — PROGRESS NOTE ADULT - PROBLEM SELECTOR PLAN 8
-on tamsulosin 0.4mg qhs at home  - Continue Flomax while in hospital  - Patient currently has Aguilera -h/x of perforated ulcer, s/p ex-lap omentopexy and plication in 5/2019  -monitor H/H closely   -patient on protonix 40mg and sucralfate 1g QID at home  - Continue protonix, sucralfate while in hospital

## 2019-08-19 NOTE — PROGRESS NOTE ADULT - PROBLEM SELECTOR PLAN 9
-Incidentally found to have 3.7 x 4.1 x 5.4 mm lobulated right MCA bifurcation aneurysm.   No major vessel occlusion or hemodynamically significant stenosis.   -f/u outpatient, no acute intervention -on tamsulosin 0.4mg qhs at home  - Continue Flomax while in hospital  - Patient currently has Aguilera

## 2019-08-19 NOTE — PROGRESS NOTE ADULT - SUBJECTIVE AND OBJECTIVE BOX
Patient is a 49y old  Male who presents with a chief complaint of CVA (18 Aug 2019 22:33)      INTERVAL HPI/OVERNIGHT EVENTS: Patient seen and examined at bedside. States that he is still weak and numb on the right side. Speech is improved. He has no complaints otherwise.     Overnight patient had 3 beats of Vtach. Mg 2.1, K 4.0, Phos 4.7    T(C): 36.8 (08-19-19 @ 04:35), Max: 37.1 (08-18-19 @ 20:19)  HR: 51 (08-19-19 @ 04:35) (51 - 62)  BP: 100/63 (08-19-19 @ 04:35) (100/63 - 147/73)  RR: 18 (08-19-19 @ 04:35) (18 - 18)  SpO2: 98% (08-19-19 @ 04:35) (96% - 98%)  Wt(kg): --    I&O's Summary    18 Aug 2019 07:01  -  19 Aug 2019 07:00  --------------------------------------------------------  IN: 1140 mL / OUT: 6350 mL / NET: -5210 mL        LABS:                        8.4    8.32  )-----------( 302      ( 19 Aug 2019 07:52 )             27.3     08-19    143  |  109<H>  |  14  ----------------------------<  110<H>  4.0   |  27  |  0.96    Ca    8.5      19 Aug 2019 07:10  Phos  4.7     08-19  Mg     2.1     08-19        CAPILLARY BLOOD GLUCOSE      POCT Blood Glucose.: 114 mg/dL (19 Aug 2019 08:26)  POCT Blood Glucose.: 297 mg/dL (18 Aug 2019 22:25)  POCT Blood Glucose.: 284 mg/dL (18 Aug 2019 21:12)  POCT Blood Glucose.: 156 mg/dL (18 Aug 2019 17:00)  POCT Blood Glucose.: 190 mg/dL (18 Aug 2019 12:01)          Culture - Urine (collected 15 Aug 2019 11:08)  Source: .Urine Catheterized  Final Report (18 Aug 2019 07:57):    >100,000 CFU/ml Acinetobacter baumannii complex/haemolyticus    >100,000 CFU/ml Enterococcus faecalis  Organism: Acinetobacter baumannii  Enterococcus faecalis (18 Aug 2019 07:57)  Organism: Enterococcus faecalis (18 Aug 2019 07:57)  Organism: Acinetobacter baumannii (18 Aug 2019 07:57)  Organism: Acinetobacter baumannii (18 Aug 2019 07:57)         MEDICATIONS  (STANDING):  amiodarone    Tablet 100 milliGRAM(s) Oral daily  apixaban 5 milliGRAM(s) Oral two times a day  atorvastatin 80 milliGRAM(s) Oral at bedtime  clopidogrel Tablet 75 milliGRAM(s) Oral daily  dextrose 5%. 1000 milliLiter(s) (50 mL/Hr) IV Continuous <Continuous>  dextrose 50% Injectable 12.5 Gram(s) IV Push once  dextrose 50% Injectable 25 Gram(s) IV Push once  dextrose 50% Injectable 25 Gram(s) IV Push once  insulin glargine Injectable (LANTUS) 8 Unit(s) SubCutaneous at bedtime  insulin lispro (HumaLOG) corrective regimen sliding scale   SubCutaneous Before meals and at bedtime  insulin lispro Injectable (HumaLOG) 3 Unit(s) SubCutaneous three times a day before meals  pantoprazole    Tablet 40 milliGRAM(s) Oral before breakfast  sucralfate 1 Gram(s) Oral four times a day  tamsulosin 0.4 milliGRAM(s) Oral at bedtime    MEDICATIONS  (PRN):  acetaminophen   Tablet .. 650 milliGRAM(s) Oral every 6 hours PRN Mild Pain (1 - 3)  dextrose 40% Gel 15 Gram(s) Oral once PRN Blood Glucose LESS THAN 70 milliGRAM(s)/deciliter  glucagon  Injectable 1 milliGRAM(s) IntraMuscular once PRN Glucose LESS THAN 70 milligrams/deciliter  ondansetron Injectable 4 milliGRAM(s) IV Push every 6 hours PRN Nausea and/or Vomiting      REVIEW OF SYSTEMS:  CONSTITUTIONAL: No fever, chills, or fatigue  ENMT: No difficulty hearing, vertigo, throat pain  RESPIRATORY: No cough, wheezing, shortness of breath  CARDIOVASCULAR: No chest pain, palpitations, dizziness, or leg swelling  GASTROINTESTINAL: No abdominal or epigastric pain. No nausea, vomiting. No diarrhea or constipation  GENITOURINARY: Catheter in place.   NEUROLOGICAL: No headaches, memory loss, admits loss of strength, admits numbness, denies tremors  MUSCULOSKELETAL: No joint pain or swelling; No muscle, back, or extremity pain    RADIOLOGY & ADDITIONAL TESTS:    Imaging Personally Reviewed:  no further imaging to review    Consultant(s) Notes Reviewed:  [ x ] YES  [ ] NO    PHYSICAL EXAM:  GENERAL: NAD, well-groomed, well-developed  HEAD: Atraumatic, Normocephalic  EYES: EOMI, conjunctiva and sclera clear  ENMT: Moist mucous membranes, no facial droop, no slurred speech  NECK: Supple, No JVD  NERVOUS SYSTEM: Alert & Oriented X3, Good concentration; reduced motor strength in right upper and lower extremities, reduced sensation in right upper and lower extremities.   CHEST/LUNG: Clear to percussion bilaterally; No rales, rhonchi, wheezing, or rubs  HEART: Regular rate and rhythm; No murmurs, rubs, or gallops  ABDOMEN: Soft, Nontender, Nondistended  EXTREMITIES: 2+ Peripheral Pulses, No clubbing, cyanosis, or edema  SKIN: Warm, dry, well-perfused    Care Discussed with Consultants/Other Providers [ x ] YES  [ ] NO

## 2019-08-19 NOTE — PROGRESS NOTE ADULT - PROBLEM SELECTOR PLAN 6
-HbA1C 8/1/19 was 8.3  -patient is on glargine 24U qhs and lispro 3U prior to meals, and metformin 1000mg BID  - Lantus 8units at night, ISS while in hospital - Patient's Imdur D/Jacinto for hypotension in ICU  - Patient started on Losartan 25mg daily.

## 2019-08-20 LAB
ANION GAP SERPL CALC-SCNC: 7 MMOL/L — SIGNIFICANT CHANGE UP (ref 5–17)
BUN SERPL-MCNC: 13 MG/DL — SIGNIFICANT CHANGE UP (ref 7–23)
CALCIUM SERPL-MCNC: 9.1 MG/DL — SIGNIFICANT CHANGE UP (ref 8.5–10.1)
CHLORIDE SERPL-SCNC: 110 MMOL/L — HIGH (ref 96–108)
CO2 SERPL-SCNC: 28 MMOL/L — SIGNIFICANT CHANGE UP (ref 22–31)
CREAT SERPL-MCNC: 1.1 MG/DL — SIGNIFICANT CHANGE UP (ref 0.5–1.3)
GLUCOSE SERPL-MCNC: 118 MG/DL — HIGH (ref 70–99)
HCT VFR BLD CALC: 29.9 % — LOW (ref 39–50)
HGB BLD-MCNC: 9.3 G/DL — LOW (ref 13–17)
MCHC RBC-ENTMCNC: 26.6 PG — LOW (ref 27–34)
MCHC RBC-ENTMCNC: 31.1 GM/DL — LOW (ref 32–36)
MCV RBC AUTO: 85.7 FL — SIGNIFICANT CHANGE UP (ref 80–100)
NRBC # BLD: 0 /100 WBCS — SIGNIFICANT CHANGE UP (ref 0–0)
PLATELET # BLD AUTO: 323 K/UL — SIGNIFICANT CHANGE UP (ref 150–400)
POTASSIUM SERPL-MCNC: 4.4 MMOL/L — SIGNIFICANT CHANGE UP (ref 3.5–5.3)
POTASSIUM SERPL-SCNC: 4.4 MMOL/L — SIGNIFICANT CHANGE UP (ref 3.5–5.3)
RBC # BLD: 3.49 M/UL — LOW (ref 4.2–5.8)
RBC # FLD: 13.5 % — SIGNIFICANT CHANGE UP (ref 10.3–14.5)
SODIUM SERPL-SCNC: 145 MMOL/L — SIGNIFICANT CHANGE UP (ref 135–145)
WBC # BLD: 8.33 K/UL — SIGNIFICANT CHANGE UP (ref 3.8–10.5)
WBC # FLD AUTO: 8.33 K/UL — SIGNIFICANT CHANGE UP (ref 3.8–10.5)

## 2019-08-20 PROCEDURE — 99232 SBSQ HOSP IP/OBS MODERATE 35: CPT

## 2019-08-20 RX ORDER — CLOPIDOGREL BISULFATE 75 MG/1
1 TABLET, FILM COATED ORAL
Qty: 0 | Refills: 0 | DISCHARGE
Start: 2019-08-20

## 2019-08-20 RX ORDER — LOSARTAN POTASSIUM 100 MG/1
1 TABLET, FILM COATED ORAL
Qty: 0 | Refills: 0 | DISCHARGE
Start: 2019-08-20

## 2019-08-20 RX ORDER — APIXABAN 2.5 MG/1
1 TABLET, FILM COATED ORAL
Qty: 0 | Refills: 0 | DISCHARGE
Start: 2019-08-20

## 2019-08-20 RX ORDER — PANTOPRAZOLE SODIUM 20 MG/1
1 TABLET, DELAYED RELEASE ORAL
Qty: 0 | Refills: 0 | DISCHARGE
Start: 2019-08-20

## 2019-08-20 RX ORDER — INSULIN GLARGINE 100 [IU]/ML
10 INJECTION, SOLUTION SUBCUTANEOUS
Qty: 0 | Refills: 0 | DISCHARGE
Start: 2019-08-20

## 2019-08-20 RX ORDER — INSULIN GLARGINE 100 [IU]/ML
8 INJECTION, SOLUTION SUBCUTANEOUS
Qty: 0 | Refills: 0 | DISCHARGE
Start: 2019-08-20

## 2019-08-20 RX ORDER — INSULIN LISPRO 100/ML
0 VIAL (ML) SUBCUTANEOUS
Qty: 0 | Refills: 0 | DISCHARGE
Start: 2019-08-20

## 2019-08-20 RX ADMIN — APIXABAN 5 MILLIGRAM(S): 2.5 TABLET, FILM COATED ORAL at 10:02

## 2019-08-20 RX ADMIN — PANTOPRAZOLE SODIUM 40 MILLIGRAM(S): 20 TABLET, DELAYED RELEASE ORAL at 05:00

## 2019-08-20 RX ADMIN — INSULIN GLARGINE 8 UNIT(S): 100 INJECTION, SOLUTION SUBCUTANEOUS at 21:32

## 2019-08-20 RX ADMIN — CLOPIDOGREL BISULFATE 75 MILLIGRAM(S): 75 TABLET, FILM COATED ORAL at 21:32

## 2019-08-20 RX ADMIN — Medication 1 GRAM(S): at 21:31

## 2019-08-20 RX ADMIN — Medication 3 UNIT(S): at 12:20

## 2019-08-20 RX ADMIN — Medication 4: at 12:21

## 2019-08-20 RX ADMIN — ATORVASTATIN CALCIUM 80 MILLIGRAM(S): 80 TABLET, FILM COATED ORAL at 21:31

## 2019-08-20 RX ADMIN — TAMSULOSIN HYDROCHLORIDE 0.4 MILLIGRAM(S): 0.4 CAPSULE ORAL at 21:32

## 2019-08-20 RX ADMIN — Medication 1 GRAM(S): at 11:19

## 2019-08-20 RX ADMIN — AMIODARONE HYDROCHLORIDE 100 MILLIGRAM(S): 400 TABLET ORAL at 05:00

## 2019-08-20 RX ADMIN — APIXABAN 5 MILLIGRAM(S): 2.5 TABLET, FILM COATED ORAL at 21:32

## 2019-08-20 RX ADMIN — Medication 1 GRAM(S): at 17:29

## 2019-08-20 RX ADMIN — Medication 3 UNIT(S): at 08:35

## 2019-08-20 RX ADMIN — Medication 1 GRAM(S): at 05:00

## 2019-08-20 RX ADMIN — Medication 4: at 21:32

## 2019-08-20 RX ADMIN — Medication 3 UNIT(S): at 17:31

## 2019-08-20 NOTE — PROGRESS NOTE ADULT - PROBLEM SELECTOR PLAN 9
-Continue Flomax while in hospital  -Patient had Aguilera on admission, failed TOV 8/19, new Aguilera was placed and pt will f/u with urology outpatient

## 2019-08-20 NOTE — PROGRESS NOTE ADULT - SUBJECTIVE AND OBJECTIVE BOX
[facility] -- Bolivar Carbajal, Brittany Gallegos Pannella, Patel, Savella  Office # [phone]      Follow Up:      Subjective/Observations:       REVIEW OF SYSTEMS: All other review of systems is negative unless indicated above    PAST MEDICAL & SURGICAL HISTORY:  CAD S/P percutaneous coronary angioplasty  Osteomyelitis: s/p debridement  History of non-ST elevation myocardial infarction (NSTEMI)  Pulmonary embolism  Perforated gastric ulcer: s/p emergent ex-lap omentopexy and plication 6/2019  BPH (benign prostatic hyperplasia)  Hypertension  Afib  Diabetes mellitus with no complication  Diabetes  Traumatic amputation of left foot, initial encounter  Perforated gastric ulcer  H/O abdominal surgery      MEDICATIONS  (STANDING):  amiodarone    Tablet 100 milliGRAM(s) Oral daily  apixaban 5 milliGRAM(s) Oral two times a day  atorvastatin 80 milliGRAM(s) Oral at bedtime  clopidogrel Tablet 75 milliGRAM(s) Oral daily  dextrose 5%. 1000 milliLiter(s) (50 mL/Hr) IV Continuous <Continuous>  dextrose 50% Injectable 12.5 Gram(s) IV Push once  dextrose 50% Injectable 25 Gram(s) IV Push once  dextrose 50% Injectable 25 Gram(s) IV Push once  insulin glargine Injectable (LANTUS) 8 Unit(s) SubCutaneous at bedtime  insulin lispro (HumaLOG) corrective regimen sliding scale   SubCutaneous Before meals and at bedtime  insulin lispro Injectable (HumaLOG) 3 Unit(s) SubCutaneous three times a day before meals  losartan 25 milliGRAM(s) Oral daily  pantoprazole    Tablet 40 milliGRAM(s) Oral before breakfast  sucralfate 1 Gram(s) Oral four times a day  tamsulosin 0.4 milliGRAM(s) Oral at bedtime    MEDICATIONS  (PRN):  acetaminophen   Tablet .. 650 milliGRAM(s) Oral every 6 hours PRN Mild Pain (1 - 3)  dextrose 40% Gel 15 Gram(s) Oral once PRN Blood Glucose LESS THAN 70 milliGRAM(s)/deciliter  glucagon  Injectable 1 milliGRAM(s) IntraMuscular once PRN Glucose LESS THAN 70 milligrams/deciliter  ondansetron Injectable 4 milliGRAM(s) IV Push every 6 hours PRN Nausea and/or Vomiting      Allergies    fish (Hives)  No Known Drug Allergies    Intolerances        Vital Signs Last 24 Hrs  T(C): 36.8 (20 Aug 2019 05:11), Max: 37.2 (19 Aug 2019 21:38)  T(F): 98.2 (20 Aug 2019 05:11), Max: 98.9 (19 Aug 2019 21:38)  HR: 63 (20 Aug 2019 05:11) (57 - 70)  BP: 100/60 (20 Aug 2019 05:11) (100/60 - 133/79)  BP(mean): --  RR: 18 (20 Aug 2019 05:11) (17 - 18)  SpO2: 95% (20 Aug 2019 05:11) (95% - 98%)    I&O's Summary    19 Aug 2019 07:01  -  20 Aug 2019 07:00  --------------------------------------------------------  IN: 750 mL / OUT: 2550 mL / NET: -1800 mL          PHYSICAL EXAM:  TELE:   Constitutional: NAD, awake and alert, well-developed  HEENT: Moist Mucous Membranes, Anicteric  Pulmonary: Non-labored, breath sounds are clear bilaterally, No wheezing, crackles or rhonchi  Cardiovascular: Regular, S1 and S2 nl, No murmurs, rubs, gallops or clicks  Gastrointestinal: Bowel Sounds present, soft, nontender.   Lymph: No lymphadenopathy. No peripheral edema.  Skin: No visible rashes or ulcers.  Psych:  Mood & affect appropriate    LABS: All Labs Reviewed:                        9.3    8.33  )-----------( 323      ( 20 Aug 2019 08:35 )             29.9                         8.4    8.32  )-----------( 302      ( 19 Aug 2019 07:52 )             27.3                         8.5    8.11  )-----------( 339      ( 18 Aug 2019 05:30 )             27.8     20 Aug 2019 08:35    145    |  110    |  13     ----------------------------<  118    4.4     |  28     |  1.10   19 Aug 2019 07:10    143    |  109    |  14     ----------------------------<  110    4.0     |  27     |  0.96   18 Aug 2019 05:30    146    |  111    |  15     ----------------------------<  126    3.8     |  28     |  0.99     Ca    9.1        20 Aug 2019 08:35  Ca    8.5        19 Aug 2019 07:10  Ca    8.4        18 Aug 2019 05:30  Phos  4.7       19 Aug 2019 07:10  Phos  4.0       18 Aug 2019 05:30  Mg     2.1       19 Aug 2019 07:10  Mg     2.1       18 Aug 2019 05:30               ECG:  < from: 12 Lead ECG (08.14.19 @ 21:48) >    Ventricular Rate 74 BPM    Atrial Rate 74 BPM    P-R Interval 136 ms    QRS Duration 72 ms    Q-T Interval 372 ms    QTC Calculation(Bezet) 412 ms    P Axis 66 degrees    R Axis 18 degrees    T Axis 50 degrees    Diagnosis Line Normal sinus rhythm  Anterior infarct , age undetermined  Nonspecific ST abnormality  Abnormal ECG    < end of copied text >      Echo:  < from: TTE Echo Doppler w/o Cont (08.15.19 @ 16:33) >    Conclusion:   Technically difficult study  Normal left ventricular internal dimensions and systolic function,   estimated LVEF of 65%.   Grossly normal RV size and systolic function.   Left atrial enlargement  Aortic valve is not well-visualized, likely normal function.   Mild to moderate MR   Trace TR.    No significant pericardial effusion.    < end of copied text >    Radiology:  < from: Xray Chest 1 View AP/PA (08.14.19 @ 21:46) >  Comparison: Prior chest x-ray examination from 7/30/2019.    Findings: The lungs are clear. The cardiomediastinal silhouette is   normal. There are mild multilevel degenerative changes of the thoracic   spine.    IMPRESSION: Clear lungs, unchanged.    < end of copied text >

## 2019-08-20 NOTE — PROGRESS NOTE ADULT - ASSESSMENT
49M with PMH CAD s/p BMS to RCA (8/2019), RUL subsegmental PE (6/2019, on Eliquis), hx of NSTEMI and PAF s/p ex-lap omentopexy and plication for perforated antral ulcer (5/2019), osteomyelitis s/p debridement,  HTN and DM2 (not on insulin) presenting to the ED after having slurred speech and R sided weakness admitted for CVA, s/p TPA in the ED, in ICU for post TPA management, now downgraded to the floor - likely TIA.

## 2019-08-20 NOTE — PROGRESS NOTE ADULT - SUBJECTIVE AND OBJECTIVE BOX
Patient is a 49y old  Male who presents with a chief complaint of CVA (18 Aug 2019 22:33)    INTERVAL HPI/OVERNIGHT EVENTS: Patient seen and examined at bedside. States that he is still weak and numb on the right side. He has no complaints otherwise.     Vital Signs Last 24 Hrs  T(C): 36.5 (20 Aug 2019 13:19), Max: 37.2 (19 Aug 2019 21:38)  T(F): 97.7 (20 Aug 2019 13:19), Max: 98.9 (19 Aug 2019 21:38)  HR: 57 (20 Aug 2019 13:19) (57 - 70)  BP: 103/59 (20 Aug 2019 13:19) (100/60 - 133/74)  BP(mean): --  RR: 18 (20 Aug 2019 13:19) (18 - 18)  SpO2: 98% (20 Aug 2019 13:19) (95% - 98%)      I&O's Summary    19 Aug 2019 07:01  -  20 Aug 2019 07:00  --------------------------------------------------------  IN: 750 mL / OUT: 2550 mL / NET: -1800 mL    20 Aug 2019 07:01  -  20 Aug 2019 17:46  --------------------------------------------------------  IN: 700 mL / OUT: 0 mL / NET: 700 mL        LABS:                        9.3    8.33  )-----------( 323      ( 20 Aug 2019 08:35 )             29.9     20 Aug 2019 08:35    145    |  110    |  13     ----------------------------<  118    4.4     |  28     |  1.10     Ca    9.1        20 Aug 2019 08:35  Phos  4.7       19 Aug 2019 07:10  Mg     2.1       19 Aug 2019 07:10        Culture - Urine (collected 15 Aug 2019 11:08)  Source: .Urine Catheterized  Final Report (18 Aug 2019 07:57):    >100,000 CFU/ml Acinetobacter baumannii complex/haemolyticus    >100,000 CFU/ml Enterococcus faecalis  Organism: Acinetobacter baumannii  Enterococcus faecalis (18 Aug 2019 07:57)  Organism: Enterococcus faecalis (18 Aug 2019 07:57)  Organism: Acinetobacter baumannii (18 Aug 2019 07:57)  Organism: Acinetobacter baumannii (18 Aug 2019 07:57)    CAPILLARY BLOOD GLUCOSE  POCT Blood Glucose.: 146 mg/dL (20 Aug 2019 17:17)  POCT Blood Glucose.: 225 mg/dL (20 Aug 2019 11:51)  POCT Blood Glucose.: 130 mg/dL (20 Aug 2019 08:19)  POCT Blood Glucose.: 259 mg/dL (19 Aug 2019 21:26)       MEDICATIONS  (STANDING):  amiodarone    Tablet 100 milliGRAM(s) Oral daily  apixaban 5 milliGRAM(s) Oral two times a day  atorvastatin 80 milliGRAM(s) Oral at bedtime  clopidogrel Tablet 75 milliGRAM(s) Oral daily  dextrose 5%. 1000 milliLiter(s) (50 mL/Hr) IV Continuous <Continuous>  dextrose 50% Injectable 12.5 Gram(s) IV Push once  dextrose 50% Injectable 25 Gram(s) IV Push once  dextrose 50% Injectable 25 Gram(s) IV Push once  insulin glargine Injectable (LANTUS) 8 Unit(s) SubCutaneous at bedtime  insulin lispro (HumaLOG) corrective regimen sliding scale   SubCutaneous Before meals and at bedtime  insulin lispro Injectable (HumaLOG) 3 Unit(s) SubCutaneous three times a day before meals  losartan 25 milliGRAM(s) Oral daily  pantoprazole    Tablet 40 milliGRAM(s) Oral before breakfast  sucralfate 1 Gram(s) Oral four times a day  tamsulosin 0.4 milliGRAM(s) Oral at bedtime    MEDICATIONS  (PRN):  acetaminophen   Tablet .. 650 milliGRAM(s) Oral every 6 hours PRN Mild Pain (1 - 3)  dextrose 40% Gel 15 Gram(s) Oral once PRN Blood Glucose LESS THAN 70 milliGRAM(s)/deciliter  glucagon  Injectable 1 milliGRAM(s) IntraMuscular once PRN Glucose LESS THAN 70 milligrams/deciliter  ondansetron Injectable 4 milliGRAM(s) IV Push every 6 hours PRN Nausea and/or Vomiting      REVIEW OF SYSTEMS:  CONSTITUTIONAL: No fever, chills, or fatigue  ENMT: No difficulty hearing, vertigo, throat pain  RESPIRATORY: No cough, wheezing, shortness of breath  CARDIOVASCULAR: No chest pain, palpitations, dizziness, or leg swelling  GASTROINTESTINAL: No abdominal or epigastric pain. No nausea, vomiting. No diarrhea or constipation  GENITOURINARY: Aguilera catheter in place.   NEUROLOGICAL: No headaches, memory loss, admits loss of strength, admits numbness, denies tremors  MUSCULOSKELETAL: No joint pain or swelling; No muscle, back, or extremity pain    RADIOLOGY & ADDITIONAL TESTS:    Imaging Personally Reviewed:  no further imaging to review    Consultant(s) Notes Reviewed:  [ x ] YES  [ ] NO    PHYSICAL EXAM:  GENERAL: NAD, well-groomed, well-developed  HEAD: Atraumatic, Normocephalic  EYES: EOMI, conjunctiva and sclera clear  ENMT: Moist mucous membranes, no facial droop, no slurred speech  NECK: Supple, No JVD  NERVOUS SYSTEM: Alert & Oriented X3, Good concentration; reduced motor strength in right upper and lower extremities, reduced sensation in right upper and lower extremities.   CHEST/LUNG: Clear to percussion bilaterally; No rales, rhonchi, wheezing, or rubs  HEART: Regular rate and rhythm; No murmurs, rubs, or gallops  ABDOMEN: Soft, Nontender, Nondistended  EXTREMITIES: 2+ Peripheral Pulses, No clubbing, cyanosis, or edema  SKIN: Warm, dry, well-perfused    Care Discussed with Consultants/Other Providers [ x ] YES  [ ] NO

## 2019-08-20 NOTE — PROGRESS NOTE ADULT - PROBLEM SELECTOR PLAN 10
10.  Continue Eliquis and Plavix    11.  Abnormal CT of the Head  -Incidentally found to have 3.7 x 4.1 x 5.4 mm lobulated right MCA bifurcation aneurysm. No major vessel occlusion or hemodynamically significant stenosis.   -f/u outpatient, no acute intervention

## 2019-08-20 NOTE — PROVIDER CONTACT NOTE (OTHER) - SITUATION
Patient was due to void at 2000. Patient refused bladder scan until 2200. Bladder scan showed 754ml.

## 2019-08-20 NOTE — PROGRESS NOTE ADULT - PROBLEM SELECTOR PLAN 7
-HbA1C 8/1/19 was 8.3  -patient is on glargine 24U qhs and lispro 3U prior to meals, and metformin 1000mg BID  -Lantus 8units at night, ISS while in hospital  -Endo Dr Perlman consulted

## 2019-08-20 NOTE — PROGRESS NOTE ADULT - PROBLEM SELECTOR PLAN 8
-h/x of perforated ulcer, s/p ex-lap omentopexy and plication in 5/2019  -monitor H/H closely   -patient on protonix 40mg and sucralfate 1g QID at home  - Continue protonix, sucralfate while in hospital

## 2019-08-21 ENCOUNTER — TRANSCRIPTION ENCOUNTER (OUTPATIENT)
Age: 49
End: 2019-08-21

## 2019-08-21 VITALS
DIASTOLIC BLOOD PRESSURE: 64 MMHG | OXYGEN SATURATION: 98 % | SYSTOLIC BLOOD PRESSURE: 104 MMHG | TEMPERATURE: 98 F | RESPIRATION RATE: 16 BRPM | HEART RATE: 63 BPM

## 2019-08-21 DIAGNOSIS — E11.65 TYPE 2 DIABETES MELLITUS WITH HYPERGLYCEMIA: ICD-10-CM

## 2019-08-21 LAB
ANION GAP SERPL CALC-SCNC: 7 MMOL/L — SIGNIFICANT CHANGE UP (ref 5–17)
BUN SERPL-MCNC: 15 MG/DL — SIGNIFICANT CHANGE UP (ref 7–23)
CALCIUM SERPL-MCNC: 8.9 MG/DL — SIGNIFICANT CHANGE UP (ref 8.5–10.1)
CHLORIDE SERPL-SCNC: 111 MMOL/L — HIGH (ref 96–108)
CO2 SERPL-SCNC: 27 MMOL/L — SIGNIFICANT CHANGE UP (ref 22–31)
CREAT SERPL-MCNC: 1 MG/DL — SIGNIFICANT CHANGE UP (ref 0.5–1.3)
GLUCOSE SERPL-MCNC: 104 MG/DL — HIGH (ref 70–99)
HCT VFR BLD CALC: 27.8 % — LOW (ref 39–50)
HGB BLD-MCNC: 8.5 G/DL — LOW (ref 13–17)
MCHC RBC-ENTMCNC: 26.2 PG — LOW (ref 27–34)
MCHC RBC-ENTMCNC: 30.6 GM/DL — LOW (ref 32–36)
MCV RBC AUTO: 85.8 FL — SIGNIFICANT CHANGE UP (ref 80–100)
NRBC # BLD: 0 /100 WBCS — SIGNIFICANT CHANGE UP (ref 0–0)
PLATELET # BLD AUTO: 312 K/UL — SIGNIFICANT CHANGE UP (ref 150–400)
POTASSIUM SERPL-MCNC: 4.2 MMOL/L — SIGNIFICANT CHANGE UP (ref 3.5–5.3)
POTASSIUM SERPL-SCNC: 4.2 MMOL/L — SIGNIFICANT CHANGE UP (ref 3.5–5.3)
RBC # BLD: 3.24 M/UL — LOW (ref 4.2–5.8)
RBC # FLD: 13.7 % — SIGNIFICANT CHANGE UP (ref 10.3–14.5)
SODIUM SERPL-SCNC: 145 MMOL/L — SIGNIFICANT CHANGE UP (ref 135–145)
WBC # BLD: 9.12 K/UL — SIGNIFICANT CHANGE UP (ref 3.8–10.5)
WBC # FLD AUTO: 9.12 K/UL — SIGNIFICANT CHANGE UP (ref 3.8–10.5)

## 2019-08-21 PROCEDURE — 97530 THERAPEUTIC ACTIVITIES: CPT

## 2019-08-21 PROCEDURE — 85027 COMPLETE CBC AUTOMATED: CPT

## 2019-08-21 PROCEDURE — 99291 CRITICAL CARE FIRST HOUR: CPT | Mod: 25

## 2019-08-21 PROCEDURE — 70498 CT ANGIOGRAPHY NECK: CPT

## 2019-08-21 PROCEDURE — 70450 CT HEAD/BRAIN W/O DYE: CPT

## 2019-08-21 PROCEDURE — 84443 ASSAY THYROID STIM HORMONE: CPT

## 2019-08-21 PROCEDURE — 70551 MRI BRAIN STEM W/O DYE: CPT

## 2019-08-21 PROCEDURE — 83735 ASSAY OF MAGNESIUM: CPT

## 2019-08-21 PROCEDURE — 87186 SC STD MICRODIL/AGAR DIL: CPT

## 2019-08-21 PROCEDURE — 93306 TTE W/DOPPLER COMPLETE: CPT

## 2019-08-21 PROCEDURE — 71045 X-RAY EXAM CHEST 1 VIEW: CPT

## 2019-08-21 PROCEDURE — 97161 PT EVAL LOW COMPLEX 20 MIN: CPT

## 2019-08-21 PROCEDURE — 97535 SELF CARE MNGMENT TRAINING: CPT

## 2019-08-21 PROCEDURE — 70496 CT ANGIOGRAPHY HEAD: CPT

## 2019-08-21 PROCEDURE — 80048 BASIC METABOLIC PNL TOTAL CA: CPT

## 2019-08-21 PROCEDURE — 97112 NEUROMUSCULAR REEDUCATION: CPT

## 2019-08-21 PROCEDURE — 97110 THERAPEUTIC EXERCISES: CPT

## 2019-08-21 PROCEDURE — 87184 SC STD DISK METHOD PER PLATE: CPT

## 2019-08-21 PROCEDURE — 84100 ASSAY OF PHOSPHORUS: CPT

## 2019-08-21 PROCEDURE — 87086 URINE CULTURE/COLONY COUNT: CPT

## 2019-08-21 PROCEDURE — 96374 THER/PROPH/DIAG INJ IV PUSH: CPT

## 2019-08-21 PROCEDURE — 97165 OT EVAL LOW COMPLEX 30 MIN: CPT

## 2019-08-21 PROCEDURE — 82962 GLUCOSE BLOOD TEST: CPT

## 2019-08-21 PROCEDURE — 99232 SBSQ HOSP IP/OBS MODERATE 35: CPT

## 2019-08-21 PROCEDURE — 97116 GAIT TRAINING THERAPY: CPT

## 2019-08-21 PROCEDURE — 85610 PROTHROMBIN TIME: CPT

## 2019-08-21 PROCEDURE — 36415 COLL VENOUS BLD VENIPUNCTURE: CPT

## 2019-08-21 PROCEDURE — 85730 THROMBOPLASTIN TIME PARTIAL: CPT

## 2019-08-21 PROCEDURE — 81001 URINALYSIS AUTO W/SCOPE: CPT

## 2019-08-21 PROCEDURE — 80053 COMPREHEN METABOLIC PANEL: CPT

## 2019-08-21 PROCEDURE — 93005 ELECTROCARDIOGRAM TRACING: CPT

## 2019-08-21 PROCEDURE — 80061 LIPID PANEL: CPT

## 2019-08-21 RX ORDER — INSULIN LISPRO 100/ML
5 VIAL (ML) SUBCUTANEOUS
Refills: 0 | Status: DISCONTINUED | OUTPATIENT
Start: 2019-08-21 | End: 2019-08-21

## 2019-08-21 RX ADMIN — AMIODARONE HYDROCHLORIDE 100 MILLIGRAM(S): 400 TABLET ORAL at 06:05

## 2019-08-21 RX ADMIN — Medication 1 GRAM(S): at 17:02

## 2019-08-21 RX ADMIN — Medication 5 UNIT(S): at 16:36

## 2019-08-21 RX ADMIN — Medication 5 UNIT(S): at 08:42

## 2019-08-21 RX ADMIN — Medication 1 GRAM(S): at 06:06

## 2019-08-21 RX ADMIN — Medication 2: at 12:10

## 2019-08-21 RX ADMIN — PANTOPRAZOLE SODIUM 40 MILLIGRAM(S): 20 TABLET, DELAYED RELEASE ORAL at 06:06

## 2019-08-21 RX ADMIN — Medication 1 GRAM(S): at 11:07

## 2019-08-21 RX ADMIN — Medication 5 UNIT(S): at 12:10

## 2019-08-21 RX ADMIN — APIXABAN 5 MILLIGRAM(S): 2.5 TABLET, FILM COATED ORAL at 09:37

## 2019-08-21 NOTE — PROGRESS NOTE ADULT - PROBLEM SELECTOR PLAN 1
-admit to ICU  -CVA s/p TPA in the ED (around 22:00)  -CT head showed no ICH, however CTA showed 3.7 x 4.1 x 5.4 mm lobulated right MCA bifurcation aneurysm  -Echo: Mild to moderate MR, Trace TR, EF 65%  -c/w atorvastatin 80mg   -PT recommends ELIAS  -Dr. Jon, neuro, consulted in ED, recs appreciated  -MRI shows no evidence of CVA.   -Patient continues to have right sided weakness and loss of sensation  -Continue to work with PT to increase strength  -Continue Eliquis and Plavix
-admit to ICU  -CVA s/p TPA in the ED (around 22:00)  -will maintain -160 to allow for permissive HTN  -CT head showed no ICH, however CTA showed 3.7 x 4.1 x 5.4 mm lobulated right MCA bifurcation aneurysm  -f/u repeat CT in 24-48 hours to r/o hemorrhagic conversion  -neuro checks q1hr for now  -f/u echo in AM to r/o valvular pathology   -f/u lipid panel, thyroid panel, HbA1C was 8.3 on 8/1/2019  -c/w atorvastatin 80mg   -PT eval/ OT eval  -Dr. Jon, neuro, consulted in ED, f/u recs  -management as per ICU
Likely TIA as MRI shows no evidence of CVA  -s/p TPA in the ED (around 22:00)  -CT head showed no ICH, however CTA showed 3.7 x 4.1 x 5.4 mm lobulated right MCA bifurcation aneurysm  -Echo: Mild to moderate MR, Trace TR, EF 65%  -Dr. Jon, neuro, consulted, recs appreciated  -PT recommends ELIAS, awaiting bed  -Patient continues to have right sided weakness and loss of sensation - continue to work with PT to increase strength  -Continue Eliquis and Plavix, atorvastatin 80mg
-admit to ICU  -CVA s/p TPA in the ED (around 22:00)  -will maintain -160 to allow for permissive HTN  -CT head showed no ICH, however CTA showed 3.7 x 4.1 x 5.4 mm lobulated right MCA bifurcation aneurysm  -f/u repeat CT in 24-48 hours to r/o hemorrhagic conversion  -neuro checks q1hr for now  -f/u echo in AM to r/o valvular pathology   -f/u lipid panel, thyroid panel, HbA1C was 8.3 on 8/1/2019  -c/w atorvastatin 80mg   -PT eval/ OT eval  -Dr. Jon, neuro, consulted in ED, recs appreciated  -management as per ICU
Likely TIA as MRI shows no evidence of CVA  -s/p TPA in the ED (around 22:00)  -CT head showed no ICH, however CTA showed 3.7 x 4.1 x 5.4 mm lobulated right MCA bifurcation aneurysm  -Echo: Mild to moderate MR, Trace TR, EF 65%  -Dr. Jon, neuro, consulted, recs appreciated  -PT recommends ELIAS, awaiting bed  -Patient continues to have right leg weakness and loss of sensation - continue to work with PT to increase strength  -Continue Eliquis and Plavix, atorvastatin 80mg
no

## 2019-08-21 NOTE — PROGRESS NOTE ADULT - ASSESSMENT
49 year old male with PAF on Eliquis, RUL subsegmental PE, hx of recent NSTEMI, perforated antral ulcer, osteomyelitis s/p debridement, HTN and DM.  He was recently admitted with chest pain and is s/p cath with mild LAD disease, 90% RCA and 100% RPL. He is now s/p BMS to RCA on 8/1/2019  He now presents with acute right sided weakness and slurred speech and is s/p tpa.    CAD recent NSTEMI   - No anginal complaints   - s/p BMS on 8/1/2019.  Continue Plavix and full dose a/c.  - Continue statin  - 2decho 8/2019 revealing ef 65%, mild to moderate MR, trace TR.  No need to repeat    HTN  - Continue losartan    PAF  - Continue Amiodarone   - BB held on admission for bradycardia  - Continue Eliquis.  Monitor for overt s/s bleeding  - Watch creatinine and electrolytes. Keep K>4, Mg>2    Anemia  - Given CAD keep hgb >8  - Stable >8    Neuro sp TPA probable CVA  - No further neuro work up per Neuro  - Continue with AC and statin    Awaiting discharge to [facility] today    Henrietta Kim [facility]  Cardiology   Spectra #[phone]

## 2019-08-21 NOTE — PROGRESS NOTE ADULT - PROBLEM SELECTOR PLAN 7
-HbA1C 8/1/19 was 8.3  -patient is on glargine 24U qhs and lispro 3U prior to meals, and metformin 1000mg BID  -Lantus 8units at night, ISS while in hospital  -Endo Dr Perlman consulted, Insulin regimen altered according to his recommendations.

## 2019-08-21 NOTE — DISCHARGE NOTE NURSING/CASE MANAGEMENT/SOCIAL WORK - [email or web address]_GEN_ALL_CORE
You can access the [facility] Portal, offered by [facility], by registering with the following website: [email or web address]

## 2019-08-21 NOTE — CONSULT NOTE ADULT - SUBJECTIVE AND OBJECTIVE BOX
Patient is a 49y old  Male who presents with a chief complaint of CVA (20 Aug 2019 17:45)      Reason For Consult: dm2 uncontrolled    HPI:  49M with PMH CAD s/p BMS to RCA (8/2019), RUL subsegmental PE (6/2019, on Eliquis), hx of NSTEMI and PAF s/p ex-lap omentopexy and plication for perforated antral ulcer (5/2019), osteomyelitis s/p debridement,  HTN and DM2 (not on insulin) presenting to the ED after having slurred speech and R sided weakness.   Patient states around 6pm-6:30pm he was sitting at table with friends when started having slurred speech and R sided arm and LE weakness, numbness and tingling.  Also felt confused and disoriented at times, associated with some blurry vision.  Patient waited about 2 hours before presenting to ED for persistent symptoms.  In the ED, received TPA for CVA.  Patient states currently still feels numbness/ tingling (pins and needles) in RUE and RLE however blurry vision comes and goes s/p TPA in the ER. Denies chest pain, palpitations, headache, n/v, abd pain, urinary or bowel incontinence.  Denies lightheadedness/ dizziness, no fall or LOC.  Patient does admit to some SOB when symptoms started, now resolved.  Admits to significant weakness on R side not baseline.  Of note patient recently admitted to [place] and transferred to [facility] on 7/31/2019 for chest pain, had cardiac cath done showed mild LAD disease, 90% RCA and 100% RPL, had BMS to RCA placed.  His baby asa was stopped but was started on plavix and Eliquis on discharge 8/9/19, since discharge patient has not had Eliquis because not covered by insurance (only was taking plavix).  Patient states he has family history of stroke on both sides of family, mother had some genetic coagulopathy (Factor V Leiden sounded familiar but not sure), however he was tested for genetic coagulopathies in 5/2011 and was negative.    In the ED, T 99.6F, HR 80, /74, RR 15, SpO2 97% on RA.  Labs significant for H/H 9.4/31.1 (baseline around 9.5-10.5), PT/INR 11.9/1.05, glucose 296.  Received TPA in the ED.    EKG: NSR  Noncontrast head CT: Stable exam. No acute intracranial hemorrhage or mass effect vasogenic edema.  CTA neck: No major vessel occlusion, hemodynamically significant stenosis according   to the NASCET criteria or dissection.  CTA head: No major vessel occlusion or hemodynamically significant stenosis. 3.7 x   4.1 x 5.4 mm lobulated right MCA bifurcation aneurysm. (14 Aug 2019 23:03)      PAST MEDICAL & SURGICAL HISTORY:  CAD S/P percutaneous coronary angioplasty  Osteomyelitis: s/p debridement  History of non-ST elevation myocardial infarction (NSTEMI)  Pulmonary embolism  Perforated gastric ulcer: s/p emergent ex-lap omentopexy and plication 6/2019  BPH (benign prostatic hyperplasia)  Hypertension  Afib  Diabetes mellitus with no complication  Diabetes  Traumatic amputation of left foot, initial encounter  Perforated gastric ulcer  H/O abdominal surgery      FAMILY HISTORY:  FH: stroke: Father  FH: coronary artery disease: Father  FH: pulmonary embolism: Mother        Social History:    MEDICATIONS  (STANDING):  amiodarone    Tablet 100 milliGRAM(s) Oral daily  apixaban 5 milliGRAM(s) Oral two times a day  atorvastatin 80 milliGRAM(s) Oral at bedtime  clopidogrel Tablet 75 milliGRAM(s) Oral daily  dextrose 5%. 1000 milliLiter(s) (50 mL/Hr) IV Continuous <Continuous>  dextrose 50% Injectable 12.5 Gram(s) IV Push once  dextrose 50% Injectable 25 Gram(s) IV Push once  dextrose 50% Injectable 25 Gram(s) IV Push once  insulin glargine Injectable (LANTUS) 8 Unit(s) SubCutaneous at bedtime  insulin lispro (HumaLOG) corrective regimen sliding scale   SubCutaneous Before meals and at bedtime  insulin lispro Injectable (HumaLOG) 3 Unit(s) SubCutaneous three times a day before meals  losartan 25 milliGRAM(s) Oral daily  pantoprazole    Tablet 40 milliGRAM(s) Oral before breakfast  sucralfate 1 Gram(s) Oral four times a day  tamsulosin 0.4 milliGRAM(s) Oral at bedtime    MEDICATIONS  (PRN):  acetaminophen   Tablet .. 650 milliGRAM(s) Oral every 6 hours PRN Mild Pain (1 - 3)  dextrose 40% Gel 15 Gram(s) Oral once PRN Blood Glucose LESS THAN 70 milliGRAM(s)/deciliter  glucagon  Injectable 1 milliGRAM(s) IntraMuscular once PRN Glucose LESS THAN 70 milligrams/deciliter  ondansetron Injectable 4 milliGRAM(s) IV Push every 6 hours PRN Nausea and/or Vomiting        T(C): 36.7 (08-21-19 @ 04:42), Max: 36.7 (08-20-19 @ 20:28)  HR: 65 (08-21-19 @ 04:42) (52 - 68)  BP: 99/59 (08-21-19 @ 04:42) (99/59 - 133/74)  RR: 17 (08-21-19 @ 04:42) (17 - 18)  SpO2: 95% (08-21-19 @ 04:42) (95% - 99%)  Wt(kg): --    PHYSICAL EXAM:  GENERAL: NAD, well-groomed, well-developed  HEAD:  Atraumatic, Normocephalic  NECK: Supple, No JVD, Normal thyroid  CHEST/LUNG: Clear to percussion bilaterally; No rales, rhonchi, wheezing, or rubs  HEART: Regular rate and rhythm; No murmurs, rubs, or gallops  ABDOMEN: Soft, Nontender, Nondistended; Bowel sounds present  EXTREMITIES:  2+ Peripheral Pulses, No clubbing, cyanosis, or edema  SKIN: No rashes or lesions    CAPILLARY BLOOD GLUCOSE      POCT Blood Glucose.: 106 mg/dL (21 Aug 2019 08:03)  POCT Blood Glucose.: 240 mg/dL (20 Aug 2019 21:30)  POCT Blood Glucose.: 146 mg/dL (20 Aug 2019 17:17)  POCT Blood Glucose.: 225 mg/dL (20 Aug 2019 11:51)  POCT Blood Glucose.: 130 mg/dL (20 Aug 2019 08:19)                            8.5    9.12  )-----------( 312      ( 21 Aug 2019 07:55 )             27.8       CMP:  08-21 @ 07:55  SGPT --  Albumin --   Alk Phos --   Anion Gap 7   SGOT --   Total Bili --   BUN 15   Calcium Total 8.9   CO2 27   Chloride 111   Creatinine 1.00   eGFR if    eGFR if non AA 87   Glucose 104   Potassium 4.2   Protein --   Sodium 145      Thyroid Function Tests:      Diabetes Tests:       Radiology:

## 2019-08-21 NOTE — PROGRESS NOTE ADULT - SUBJECTIVE AND OBJECTIVE BOX
[facility] -- Bolivar Carbajal, El, Brittany, Reynaldo Sweeney Savella  Office # [phone]    Follow Up:  Afib s/p CVA    Subjective/Observations: Sitting on the chair, comfortable on RA.  No complaints.  Aguilera in situ.  Awaiting for discharge    REVIEW OF SYSTEMS: All other review of systems is negative unless indicated above  PAST MEDICAL & SURGICAL HISTORY:  CAD S/P percutaneous coronary angioplasty  Osteomyelitis: s/p debridement  History of non-ST elevation myocardial infarction (NSTEMI)  Pulmonary embolism  Perforated gastric ulcer: s/p emergent ex-lap omentopexy and plication 6/2019  BPH (benign prostatic hyperplasia)  Hypertension  Afib  Diabetes mellitus with no complication  Diabetes  Traumatic amputation of left foot, initial encounter  Perforated gastric ulcer  H/O abdominal surgery    MEDICATIONS  (STANDING):  amiodarone    Tablet 100 milliGRAM(s) Oral daily  apixaban 5 milliGRAM(s) Oral two times a day  atorvastatin 80 milliGRAM(s) Oral at bedtime  clopidogrel Tablet 75 milliGRAM(s) Oral daily  dextrose 5%. 1000 milliLiter(s) (50 mL/Hr) IV Continuous <Continuous>  dextrose 50% Injectable 12.5 Gram(s) IV Push once  dextrose 50% Injectable 25 Gram(s) IV Push once  dextrose 50% Injectable 25 Gram(s) IV Push once  insulin glargine Injectable (LANTUS) 8 Unit(s) SubCutaneous at bedtime  insulin lispro (HumaLOG) corrective regimen sliding scale   SubCutaneous Before meals and at bedtime  insulin lispro Injectable (HumaLOG) 5 Unit(s) SubCutaneous three times a day before meals  losartan 25 milliGRAM(s) Oral daily  pantoprazole    Tablet 40 milliGRAM(s) Oral before breakfast  sucralfate 1 Gram(s) Oral four times a day  tamsulosin 0.4 milliGRAM(s) Oral at bedtime    MEDICATIONS  (PRN):  acetaminophen   Tablet .. 650 milliGRAM(s) Oral every 6 hours PRN Mild Pain (1 - 3)  dextrose 40% Gel 15 Gram(s) Oral once PRN Blood Glucose LESS THAN 70 milliGRAM(s)/deciliter  glucagon  Injectable 1 milliGRAM(s) IntraMuscular once PRN Glucose LESS THAN 70 milligrams/deciliter  ondansetron Injectable 4 milliGRAM(s) IV Push every 6 hours PRN Nausea and/or Vomiting    Allergies    fish (Hives)  No Known Drug Allergies    Intolerances    Vital Signs Last 24 Hrs  T(C): 36.6 (21 Aug 2019 14:00), Max: 36.7 (20 Aug 2019 20:28)  T(F): 97.8 (21 Aug 2019 14:00), Max: 98.1 (20 Aug 2019 20:28)  HR: 63 (21 Aug 2019 14:00) (52 - 72)  BP: 104/64 (21 Aug 2019 14:00) (99/59 - 118/76)  BP(mean): --  RR: 16 (21 Aug 2019 14:00) (16 - 18)  SpO2: 98% (21 Aug 2019 14:00) (95% - 99%)  I&O's Summary    20 Aug 2019 07:01  -  21 Aug 2019 07:00  --------------------------------------------------------  IN: 700 mL / OUT: 5700 mL / NET: -5000 mL    21 Aug 2019 07:01  -  21 Aug 2019 15:44  --------------------------------------------------------  IN: 0 mL / OUT: 1000 mL / NET: -1000 mL    PHYSICAL EXAM:  TELE: Not on tele  Constitutional: NAD, awake and alert, obese  HEENT: Moist Mucous Membranes, Anicteric  Pulmonary: Non-labored, breath sounds are clear but diminished bilaterally, No wheezing, rales or rhonchi  Cardiovascular: Regular, S1 and S2, + murmurs.  No rubs, gallops or clicks  Gastrointestinal: Bowel Sounds present, soft, nontender.   Lymph: No peripheral edema. No lymphadenopathy.  Skin: No visible rashes or ulcers.  Psych:  Mood & affect appropriate  LABS: All Labs Reviewed:                        8.5    9.12  )-----------( 312      ( 21 Aug 2019 07:55 )             27.8                         9.3    8.33  )-----------( 323      ( 20 Aug 2019 08:35 )             29.9                         8.4    8.32  )-----------( 302      ( 19 Aug 2019 07:52 )             27.3     21 Aug 2019 07:55    145    |  111    |  15     ----------------------------<  104    4.2     |  27     |  1.00   20 Aug 2019 08:35    145    |  110    |  13     ----------------------------<  118    4.4     |  28     |  1.10   19 Aug 2019 07:10    143    |  109    |  14     ----------------------------<  110    4.0     |  27     |  0.96     Ca    8.9        21 Aug 2019 07:55  Ca    9.1        20 Aug 2019 08:35  Ca    8.5        19 Aug 2019 07:10  Phos  4.7       19 Aug 2019 07:10  Mg     2.1       19 Aug 2019 07:10    < from: TTE Echo Doppler w/o Cont (08.15.19 @ 16:33) >     EXAM:  ECHO TTE WO CON COMP W DOPPLR      PROCEDURE DATE:  08/15/2019      INTERPRETATION:  INDICATION: Atrial fibrillation    Blood Pressure 128/64    Height 187.9 cm     Weight 91 kg       BSA 2.18   sq m    Dimensions:    LA 3.7       Normal Values: 2.0 - 4.0 cm    Ao 3.6        Normal Values: 2.0 - 3.8 cm  SEPTUM 1.6       Normal Values: 0.6 - 1.2 cm  PWT 1.4       Normal Values: 0.6 - 1.1 cm  LVIDd 5.3         Normal Values: 3.0 - 5.6 cm  LVIDs 2.7         Normal Values: 1.8 - 4.0 cm    OBSERVATIONS:  Technically difficult study  Mitral Valve: normal, mild to moderate MR.  Aortic Valve/Aorta: Aortic valve is not well-visualized, likely normal   function.  Tricuspid Valve: normal with trace TR.  Pulmonic Valve: normal  Left Atrium: Enlarged  Right Atrium: normal  Left Ventricle: normal LV size and systolic function, estimated LVEF of   65%. Left ventricular hypertrophy  Right Ventricle: Grossly normal size and systolic function.  Pericardium/Pleura: normal, no significant pericardial effusion.  LV Diastolic Function: normal    Conclusion:   Technically difficult study  Normal left ventricular internal dimensions and systolic function,   estimated LVEF of 65%.   Grossly normal RV size and systolic function.   Left atrial enlargement  Aortic valve is not well-visualized, likely normal function.   Mild to moderate MR   Trace TR.    No significant pericardial effusion.      MIROSLAVA CASTAÑEDA   This document has been electronically signed. Aug 16 2019  8:25AM     < end of copied text >    < from: Xray Chest 1 View AP/PA (08.14.19 @ 21:46) >    EXAM:  XR CHEST AP OR PA 1V                          PROCEDURE DATE:  08/14/2019      INTERPRETATION:  Clinical information: Code stroke.    Technique: Frontal view of the chest.    Comparison: Prior chest x-ray examination from 7/30/2019.    Findings: The lungs are clear. The cardiomediastinal silhouette is   normal. There are mild multilevel degenerative changes of the thoracic   spine.    IMPRESSION: Clear lungs, unchanged.    NICOLAS LAI M.D., ATTENDING RADIOLOGIST  This document has been electronically signed. Aug 15 2019  7:53AM     < end of copied text >    < from: 12 Lead ECG (08.14.19 @ 21:48) >    Ventricular Rate 74 BPM    Atrial Rate 74 BPM    P-R Interval 136 ms    QRS Duration 72 ms    Q-T Interval 372 ms    QTC Calculation(Bezet) 412 ms    P Axis 66 degrees    R Axis 18 degrees    T Axis 50 degrees    Diagnosis Line Normal sinus rhythm  Anterior infarct , age undetermined  Nonspecific ST abnormality  Abnormal ECG    Confirmed by darien France ([id number]) on 8/15/2019 3:34:50 PM    < end of copied text >

## 2019-08-21 NOTE — PROGRESS NOTE ADULT - PROBLEM SELECTOR PLAN 3
-PAF, HR controlled   -patient on amiodarone, Eliquis and Plavix
-PAF, HR controlled   -patient on amiodarone 100mg QD and Eliquis 5mg BID  -management as per ICU
-PAF, HR controlled   -patient on amiodarone 100mg QD and Eliquis 5mg BID  -management as per ICU
-PAF, HR controlled   -patient on amiodarone, Eliquis and Plavix
-PAF, HR controlled   -patient on amiodarone, Eliquis and Plavix

## 2019-08-21 NOTE — PROGRESS NOTE ADULT - PROBLEM SELECTOR PLAN 5
-Diagnosed with RUL PE in 6/2019  -Continue Eliquis and Plavix
-Diagnosed with RUL PE in 6/2019  -on Eliquis 5mg BID  -management as per ICU
-Diagnosed with RUL PE in 6/2019  -Continue Eliquis and Plavix
-Diagnosed with RUL PE in 6/2019  -Continue Eliquis and Plavix
-Diagnosed with RUL PE in 6/2019  -on Eliquis 5mg BID  -management as per ICU

## 2019-08-21 NOTE — PROGRESS NOTE ADULT - PROBLEM SELECTOR PLAN 4
-NSTEMI s/p ex-lap omentopexy and plication in 5/2019 of perforated ulcer  -stable angina at times, on Imdur 30mg at home - D/Jacinto for hypotension in ICU  -Continue atorvastatin 80mg at home  -Continue Eliquis and Plavix
-NSTEMI s/p ex-lap omentopexy and plication in 5/2019 of perforated ulcer  -stable angina at times, on Imdur 30mg at home  -atorvastatin 80mg at home  -management as per ICU
-NSTEMI s/p ex-lap omentopexy and plication in 5/2019 of perforated ulcer  -stable angina at times, on Imdur 30mg at home  -atorvastatin 80mg at home  -management as per ICU
-NSTEMI s/p ex-lap omentopexy and plication in 5/2019 of perforated ulcer  -stable angina at times, on Imdur 30mg at home  -atorvastatin 80mg at home - continue in hospital  -Continue Eliquis and Plavix
-NSTEMI s/p ex-lap omentopexy and plication in 5/2019 of perforated ulcer  -stable angina at times, on Imdur 30mg at home - D/Jacinto for hypotension in ICU  -Continue atorvastatin 80mg at home  -Continue Eliquis and Plavix

## 2019-08-21 NOTE — PROGRESS NOTE ADULT - PROBLEM SELECTOR PROBLEM 4
History of non-ST elevation myocardial infarction (NSTEMI)

## 2019-08-21 NOTE — CONSULT NOTE ADULT - PROBLEM SELECTOR RECOMMENDATION 9
increase humalog 5 units 3x/day  cont lantus 8 units qhs  cont mod dose humalog scale coverage qac/qhs  goal bg 100-180 in hosp setting

## 2019-08-21 NOTE — PROGRESS NOTE ADULT - PROBLEM SELECTOR PROBLEM 2
CAD S/P percutaneous coronary angioplasty

## 2019-08-21 NOTE — PROGRESS NOTE ADULT - PROBLEM SELECTOR PROBLEM 1
CVA (cerebral vascular accident)
TIA (transient ischemic attack)
TIA (transient ischemic attack)
CVA (cerebral vascular accident)
TIA (transient ischemic attack)

## 2019-08-21 NOTE — PROGRESS NOTE ADULT - SUBJECTIVE AND OBJECTIVE BOX
Patient is a 49y old  Male who presents with a chief complaint of CVA (21 Aug 2019 08:17)      INTERVAL HPI/OVERNIGHT EVENTS: No acute events overnight. Patient seen and examined at bedside. States that the strength and sensation is improving in his right arm. No new complaints.     T(C): 36.6 (08-21-19 @ 14:00), Max: 36.7 (08-20-19 @ 20:28)  HR: 63 (08-21-19 @ 14:00) (52 - 72)  BP: 104/64 (08-21-19 @ 14:00) (99/59 - 118/76)  RR: 16 (08-21-19 @ 14:00) (16 - 18)  SpO2: 98% (08-21-19 @ 14:00) (95% - 99%)  Wt(kg): --    I&O's Summary    20 Aug 2019 07:01  -  21 Aug 2019 07:00  --------------------------------------------------------  IN: 700 mL / OUT: 5700 mL / NET: -5000 mL    21 Aug 2019 07:01  -  21 Aug 2019 15:12  --------------------------------------------------------  IN: 0 mL / OUT: 1000 mL / NET: -1000 mL        LABS:                        8.5    9.12  )-----------( 312      ( 21 Aug 2019 07:55 )             27.8     08-21    145  |  111<H>  |  15  ----------------------------<  104<H>  4.2   |  27  |  1.00    Ca    8.9      21 Aug 2019 07:55        CAPILLARY BLOOD GLUCOSE      POCT Blood Glucose.: 177 mg/dL (21 Aug 2019 12:02)  POCT Blood Glucose.: 106 mg/dL (21 Aug 2019 08:03)  POCT Blood Glucose.: 240 mg/dL (20 Aug 2019 21:30)  POCT Blood Glucose.: 146 mg/dL (20 Aug 2019 17:17)          Culture - Urine (collected 15 Aug 2019 11:08)  Source: .Urine Catheterized  Final Report (18 Aug 2019 07:57):    >100,000 CFU/ml Acinetobacter baumannii complex/haemolyticus    >100,000 CFU/ml Enterococcus faecalis  Organism: Acinetobacter baumannii  Enterococcus faecalis (18 Aug 2019 07:57)  Organism: Enterococcus faecalis (18 Aug 2019 07:57)  Organism: Acinetobacter baumannii (18 Aug 2019 07:57)  Organism: Acinetobacter baumannii (18 Aug 2019 07:57)         MEDICATIONS  (STANDING):  amiodarone    Tablet 100 milliGRAM(s) Oral daily  apixaban 5 milliGRAM(s) Oral two times a day  atorvastatin 80 milliGRAM(s) Oral at bedtime  clopidogrel Tablet 75 milliGRAM(s) Oral daily  dextrose 5%. 1000 milliLiter(s) (50 mL/Hr) IV Continuous <Continuous>  dextrose 50% Injectable 12.5 Gram(s) IV Push once  dextrose 50% Injectable 25 Gram(s) IV Push once  dextrose 50% Injectable 25 Gram(s) IV Push once  insulin glargine Injectable (LANTUS) 8 Unit(s) SubCutaneous at bedtime  insulin lispro (HumaLOG) corrective regimen sliding scale   SubCutaneous Before meals and at bedtime  insulin lispro Injectable (HumaLOG) 5 Unit(s) SubCutaneous three times a day before meals  losartan 25 milliGRAM(s) Oral daily  pantoprazole    Tablet 40 milliGRAM(s) Oral before breakfast  sucralfate 1 Gram(s) Oral four times a day  tamsulosin 0.4 milliGRAM(s) Oral at bedtime    MEDICATIONS  (PRN):  acetaminophen   Tablet .. 650 milliGRAM(s) Oral every 6 hours PRN Mild Pain (1 - 3)  dextrose 40% Gel 15 Gram(s) Oral once PRN Blood Glucose LESS THAN 70 milliGRAM(s)/deciliter  glucagon  Injectable 1 milliGRAM(s) IntraMuscular once PRN Glucose LESS THAN 70 milligrams/deciliter  ondansetron Injectable 4 milliGRAM(s) IV Push every 6 hours PRN Nausea and/or Vomiting      REVIEW OF SYSTEMS:  CONSTITUTIONAL: No fever, chills, or fatigue  ENMT: No difficulty hearing, vertigo, throat pain  RESPIRATORY: No cough, wheezing, shortness of breath  CARDIOVASCULAR: No chest pain, palpitations, dizziness, or leg swelling  GASTROINTESTINAL: No abdominal or epigastric pain. No nausea, vomiting. No diarrhea or constipation  GENITOURINARY: Aguilera catheter in place.   NEUROLOGICAL: No headaches, memory loss, admits loss of strength in right leg, admits numbness in right leg, denies tremors  MUSCULOSKELETAL: No joint pain or swelling; No muscle, back, or extremity pain    RADIOLOGY & ADDITIONAL TESTS:  No imaging or additional tests to review    Consultant(s) Notes Reviewed:  [ x ] YES  [ ] NO    PHYSICAL EXAM:  GENERAL: NAD, well-groomed, well-developed  HEAD: Atraumatic, Normocephalic  EYES: EOMI, conjunctiva and sclera clear  ENMT: Moist mucous membranes, no facial droop, no slurred speech  NECK: Supple, No JVD  NERVOUS SYSTEM: Alert & Oriented X3, Good concentration; reduced motor strength in right lower extremity, reduced sensation in right lower extremity.   CHEST/LUNG: Clear to percussion bilaterally; No rales, rhonchi, wheezing, or rubs  HEART: Regular rate and rhythm; No murmurs, rubs, or gallops  ABDOMEN: Soft, Nontender, Nondistended  EXTREMITIES: 2+ Peripheral Pulses, No clubbing, cyanosis, or edema  SKIN: Warm, dry, well-perfused    Care Discussed with Consultants/Other Providers [ ] YES  [ ] NO

## 2019-10-14 ENCOUNTER — EMERGENCY (EMERGENCY)
Facility: HOSPITAL | Age: 49
LOS: 1 days | End: 2019-10-14
Attending: EMERGENCY MEDICINE
Payer: COMMERCIAL

## 2019-10-14 VITALS
SYSTOLIC BLOOD PRESSURE: 136 MMHG | RESPIRATION RATE: 15 BRPM | DIASTOLIC BLOOD PRESSURE: 69 MMHG | OXYGEN SATURATION: 98 % | HEART RATE: 72 BPM | TEMPERATURE: 98 F

## 2019-10-14 VITALS
OXYGEN SATURATION: 98 % | HEART RATE: 67 BPM | RESPIRATION RATE: 18 BRPM | SYSTOLIC BLOOD PRESSURE: 160 MMHG | WEIGHT: 205.03 LBS | DIASTOLIC BLOOD PRESSURE: 92 MMHG | TEMPERATURE: 99 F

## 2019-10-14 DIAGNOSIS — Z98.890 OTHER SPECIFIED POSTPROCEDURAL STATES: Chronic | ICD-10-CM

## 2019-10-14 DIAGNOSIS — K25.5 CHRONIC OR UNSPECIFIED GASTRIC ULCER WITH PERFORATION: Chronic | ICD-10-CM

## 2019-10-14 DIAGNOSIS — S98.912A COMPLETE TRAUMATIC AMPUTATION OF LEFT FOOT, LEVEL UNSPECIFIED, INITIAL ENCOUNTER: Chronic | ICD-10-CM

## 2019-10-14 PROBLEM — I25.10 ATHEROSCLEROTIC HEART DISEASE OF NATIVE CORONARY ARTERY WITHOUT ANGINA PECTORIS: Chronic | Status: ACTIVE | Noted: 2019-08-15

## 2019-10-14 LAB
ALBUMIN SERPL ELPH-MCNC: 3.4 G/DL — SIGNIFICANT CHANGE UP (ref 3.3–5)
ALP SERPL-CCNC: 160 U/L — HIGH (ref 40–120)
ALT FLD-CCNC: 18 U/L — SIGNIFICANT CHANGE UP (ref 12–78)
ANION GAP SERPL CALC-SCNC: 9 MMOL/L — SIGNIFICANT CHANGE UP (ref 5–17)
AST SERPL-CCNC: 14 U/L — LOW (ref 15–37)
BASOPHILS # BLD AUTO: 0.05 K/UL — SIGNIFICANT CHANGE UP (ref 0–0.2)
BASOPHILS NFR BLD AUTO: 0.6 % — SIGNIFICANT CHANGE UP (ref 0–2)
BILIRUB SERPL-MCNC: 0.3 MG/DL — SIGNIFICANT CHANGE UP (ref 0.2–1.2)
BUN SERPL-MCNC: 14 MG/DL — SIGNIFICANT CHANGE UP (ref 7–23)
CALCIUM SERPL-MCNC: 9.1 MG/DL — SIGNIFICANT CHANGE UP (ref 8.5–10.1)
CHLORIDE SERPL-SCNC: 107 MMOL/L — SIGNIFICANT CHANGE UP (ref 96–108)
CK MB BLD-MCNC: 7.1 % — HIGH (ref 0–3.5)
CK MB BLD-MCNC: 7.1 % — HIGH (ref 0–3.5)
CK MB CFR SERPL CALC: 3.4 NG/ML — SIGNIFICANT CHANGE UP (ref 0–3.6)
CK MB CFR SERPL CALC: 3.5 NG/ML — SIGNIFICANT CHANGE UP (ref 0–3.6)
CK SERPL-CCNC: 48 U/L — SIGNIFICANT CHANGE UP (ref 26–308)
CK SERPL-CCNC: 49 U/L — SIGNIFICANT CHANGE UP (ref 26–308)
CO2 SERPL-SCNC: 25 MMOL/L — SIGNIFICANT CHANGE UP (ref 22–31)
CREAT SERPL-MCNC: 0.89 MG/DL — SIGNIFICANT CHANGE UP (ref 0.5–1.3)
EOSINOPHIL # BLD AUTO: 0.49 K/UL — SIGNIFICANT CHANGE UP (ref 0–0.5)
EOSINOPHIL NFR BLD AUTO: 5.7 % — SIGNIFICANT CHANGE UP (ref 0–6)
GLUCOSE SERPL-MCNC: 112 MG/DL — HIGH (ref 70–99)
HCT VFR BLD CALC: 34 % — LOW (ref 39–50)
HGB BLD-MCNC: 10.2 G/DL — LOW (ref 13–17)
IMM GRANULOCYTES NFR BLD AUTO: 0.1 % — SIGNIFICANT CHANGE UP (ref 0–1.5)
INR BLD: 1.26 RATIO — HIGH (ref 0.88–1.16)
LYMPHOCYTES # BLD AUTO: 1.94 K/UL — SIGNIFICANT CHANGE UP (ref 1–3.3)
LYMPHOCYTES # BLD AUTO: 22.7 % — SIGNIFICANT CHANGE UP (ref 13–44)
MAGNESIUM SERPL-MCNC: 1.9 MG/DL — SIGNIFICANT CHANGE UP (ref 1.6–2.6)
MCHC RBC-ENTMCNC: 24.9 PG — LOW (ref 27–34)
MCHC RBC-ENTMCNC: 30 GM/DL — LOW (ref 32–36)
MCV RBC AUTO: 83.1 FL — SIGNIFICANT CHANGE UP (ref 80–100)
MONOCYTES # BLD AUTO: 1.11 K/UL — HIGH (ref 0–0.9)
MONOCYTES NFR BLD AUTO: 13 % — SIGNIFICANT CHANGE UP (ref 2–14)
NEUTROPHILS # BLD AUTO: 4.93 K/UL — SIGNIFICANT CHANGE UP (ref 1.8–7.4)
NEUTROPHILS NFR BLD AUTO: 57.9 % — SIGNIFICANT CHANGE UP (ref 43–77)
NRBC # BLD: 0 /100 WBCS — SIGNIFICANT CHANGE UP (ref 0–0)
PHOSPHATE SERPL-MCNC: 3.9 MG/DL — SIGNIFICANT CHANGE UP (ref 2.5–4.5)
PLATELET # BLD AUTO: 289 K/UL — SIGNIFICANT CHANGE UP (ref 150–400)
POTASSIUM SERPL-MCNC: 4.1 MMOL/L — SIGNIFICANT CHANGE UP (ref 3.5–5.3)
POTASSIUM SERPL-SCNC: 4.1 MMOL/L — SIGNIFICANT CHANGE UP (ref 3.5–5.3)
PROT SERPL-MCNC: 7.4 G/DL — SIGNIFICANT CHANGE UP (ref 6–8.3)
PROTHROM AB SERPL-ACNC: 14.5 SEC — HIGH (ref 10–12.9)
RBC # BLD: 4.09 M/UL — LOW (ref 4.2–5.8)
RBC # FLD: 18.8 % — HIGH (ref 10.3–14.5)
SODIUM SERPL-SCNC: 141 MMOL/L — SIGNIFICANT CHANGE UP (ref 135–145)
TROPONIN I SERPL-MCNC: 0.02 NG/ML — SIGNIFICANT CHANGE UP (ref 0.01–0.04)
TROPONIN I SERPL-MCNC: 0.03 NG/ML — SIGNIFICANT CHANGE UP (ref 0.01–0.04)
WBC # BLD: 8.53 K/UL — SIGNIFICANT CHANGE UP (ref 3.8–10.5)
WBC # FLD AUTO: 8.53 K/UL — SIGNIFICANT CHANGE UP (ref 3.8–10.5)

## 2019-10-14 PROCEDURE — 99285 EMERGENCY DEPT VISIT HI MDM: CPT

## 2019-10-14 PROCEDURE — 36415 COLL VENOUS BLD VENIPUNCTURE: CPT

## 2019-10-14 PROCEDURE — 71046 X-RAY EXAM CHEST 2 VIEWS: CPT | Mod: 26

## 2019-10-14 PROCEDURE — 93010 ELECTROCARDIOGRAM REPORT: CPT

## 2019-10-14 PROCEDURE — 83735 ASSAY OF MAGNESIUM: CPT

## 2019-10-14 PROCEDURE — 83880 ASSAY OF NATRIURETIC PEPTIDE: CPT

## 2019-10-14 PROCEDURE — 82553 CREATINE MB FRACTION: CPT

## 2019-10-14 PROCEDURE — 82550 ASSAY OF CK (CPK): CPT

## 2019-10-14 PROCEDURE — 71275 CT ANGIOGRAPHY CHEST: CPT | Mod: 26

## 2019-10-14 PROCEDURE — 93005 ELECTROCARDIOGRAM TRACING: CPT

## 2019-10-14 PROCEDURE — 99284 EMERGENCY DEPT VISIT MOD MDM: CPT | Mod: 25

## 2019-10-14 PROCEDURE — 85027 COMPLETE CBC AUTOMATED: CPT

## 2019-10-14 PROCEDURE — 85610 PROTHROMBIN TIME: CPT

## 2019-10-14 PROCEDURE — 85379 FIBRIN DEGRADATION QUANT: CPT

## 2019-10-14 PROCEDURE — 84484 ASSAY OF TROPONIN QUANT: CPT

## 2019-10-14 PROCEDURE — 71046 X-RAY EXAM CHEST 2 VIEWS: CPT

## 2019-10-14 PROCEDURE — 80053 COMPREHEN METABOLIC PANEL: CPT

## 2019-10-14 PROCEDURE — 71275 CT ANGIOGRAPHY CHEST: CPT

## 2019-10-14 PROCEDURE — 84100 ASSAY OF PHOSPHORUS: CPT

## 2019-10-14 RX ORDER — ASPIRIN/CALCIUM CARB/MAGNESIUM 324 MG
325 TABLET ORAL ONCE
Refills: 0 | Status: COMPLETED | OUTPATIENT
Start: 2019-10-14 | End: 2019-10-14

## 2019-10-14 RX ORDER — SODIUM CHLORIDE 9 MG/ML
3 INJECTION INTRAMUSCULAR; INTRAVENOUS; SUBCUTANEOUS EVERY 8 HOURS
Refills: 0 | Status: DISCONTINUED | OUTPATIENT
Start: 2019-10-14 | End: 2019-10-19

## 2019-10-14 RX ADMIN — Medication 325 MILLIGRAM(S): at 12:57

## 2019-10-14 RX ADMIN — SODIUM CHLORIDE 3 MILLILITER(S): 9 INJECTION INTRAMUSCULAR; INTRAVENOUS; SUBCUTANEOUS at 12:58

## 2019-10-14 NOTE — ED PROVIDER NOTE - ATTENDING CONTRIBUTION TO CARE
pt is a 50 yo male who has sig cardiac history was in cardiac rehab and developed sudden onset chest pain  cardiologist is dr barrios  on eval:  adult male appears older than stated age in no distress sitting on edge of bed having just emptied his jacques bag.  heent normal  cor tami  abd soft nt nd with reducible incisional hernia  ext bl pedal edema  neuro normal  plan: ro acs card consultation cardiac monitor ekg labs

## 2019-10-14 NOTE — ED PROVIDER NOTE - OBJECTIVE STATEMENT
49 y male PMHx of CVA (august 2019, residual right le weakness), CAD s/p BMS in RCA (8/2019), RUL PE on eliquis, history of NSTEMI and PAF, perforated antral ulcer s/p repair, OM s/p debridement presents for chest pain. Patient was in PT at his rehab facility and was on stationary bike when he began to experience chest pain and associated SOB. Patient states the pain is mid sternal, initially 8/10 and now a 2/10. Non radiating. SOB now resolved. Patient denies current SOB, dizziness, diaphoresis, abdominal pain.

## 2019-10-14 NOTE — ED PROVIDER NOTE - PMH
Afib    BPH (benign prostatic hyperplasia)    CAD S/P percutaneous coronary angioplasty    Diabetes    Diabetes mellitus with no complication    History of non-ST elevation myocardial infarction (NSTEMI)    Hypertension    Osteomyelitis  s/p debridement  Perforated gastric ulcer  s/p emergent ex-lap omentopexy and plication 6/2019  Pulmonary embolism

## 2019-10-14 NOTE — ED PROVIDER NOTE - PATIENT PORTAL LINK FT
You can access the FollowMyHealth Patient Portal offered by [facility] by registering at the following website: [email or web address]. By joining [email or web address]’s FollowMyHealth portal, you will also be able to view your health information using other applications (apps) compatible with our system.

## 2019-10-14 NOTE — ED ADULT NURSE NOTE - OBJECTIVE STATEMENT
Received the patient in the Er. Patient is alert and oriented. Skin warm and dry. C/O chest pain. Denies any pain at this time. Hooked the patient to cardiac monitor.

## 2019-10-14 NOTE — ED ADULT NURSE NOTE - NSIMPLEMENTINTERV_GEN_ALL_ED
Implemented All Fall with Harm Risk Interventions:  [place] to call system. Call bell, personal items and telephone within reach. Instruct patient to call for assistance. Room bathroom lighting operational. Non-slip footwear when patient is off stretcher. Physically safe environment: no spills, clutter or unnecessary equipment. Stretcher in lowest position, wheels locked, appropriate side rails in place. Provide visual cue, wrist band, yellow gown, etc. Monitor gait and stability. Monitor for mental status changes and reorient to person, place, and time. Review medications for side effects contributing to fall risk. Reinforce activity limits and safety measures with patient and family. Provide visual clues: red socks.

## 2019-10-14 NOTE — ED PROVIDER NOTE - PROGRESS NOTE DETAILS
seen by cardiology Dr. France. will order repeat cardiac enzymes at 6pm as requested. D Dimer 259, CTA  chest ordered.

## 2019-10-14 NOTE — CONSULT NOTE ADULT - SUBJECTIVE AND OBJECTIVE BOX
[facility] - Bolivar Carbajal, Brittany Gallegos, Zahra, Reynaldo, Román Boss  Office Number: [phone]    Initial Consult Note    CHIEF COMPLAINT: Patient is a 49y old  Male who presents with a chief complaint of chest pain.     HPI: 49 year old male with PAF on Eliquis, RUL subsegmental PE, hx of recent NSTEMI, perforated antral ulcer, osteomyelitis s/p debridement, HTN and DM.  He was recently admitted with chest pain and is s/p cath with mild LAD disease, 90% RCA and 100% RPL. He is now s/p BMS to RCA on 8/1/2019.  He then was readmitted 8/21/2019 with acute right sided weakness and slurred speech and received tpa.    He was seen today in ED c/o chest pain x15 minutes while he was exercising on the bike at [facility] that did not radiate. His chest is has resolved now.       PAST MEDICAL & SURGICAL HISTORY:  CAD S/P percutaneous coronary angioplasty  Osteomyelitis: s/p debridement  History of non-ST elevation myocardial infarction (NSTEMI)  Pulmonary embolism  Perforated gastric ulcer: s/p emergent ex-lap omentopexy and plication 6/2019  BPH (benign prostatic hyperplasia)  Hypertension  Afib  Diabetes mellitus with no complication  Diabetes  Traumatic amputation of left foot, initial encounter  Perforated gastric ulcer  H/O abdominal surgery    SOCIAL HISTORY:  Lives at home with friends, ambulates with walker since 5/2019  patient denies illicit drug use, social drinker, quite cigarette use on May 10th 2019, smoked 1.5-2 pack cigarettes for 30 years prior.    FAMILY HISTORY:  FH: stroke (Father): Father  FH: coronary artery disease (Father): Father  FH: pulmonary embolism (Mother): Mother    No family history of acute MI or sudden cardiac death.  MEDICATIONS  (STANDING):  sodium chloride 0.9% lock flush 3 milliLiter(s) IV Push every 8 hours    MEDICATIONS  (PRN):  Home Medications:  amiodarone 100mg daily   atorvastatin 80mg daily   apixaban 5 mg oral tablet: 1 tab(s) orally 2 times a day (20 Aug 2019 08:15)  clopidogrel 75 mg oral tablet: 1 tab(s) orally once a day (20 Aug 2019 08:15)  insulin glargine: 8 unit(s) subcutaneous once a day (at bedtime) (20 Aug 2019 11:11)  insulin lispro 100 units/mL subcutaneous solution: 5 unit(s) subcutaneous 3 times a day (before meals) (21 Aug 2019 12:03)  losartan 25 mg oral tablet: 1 tab(s) orally once a day, hold for SBP &lt; 100 (20 Aug 2019 11:18)  pantoprazole 40 mg oral delayed release tablet: 1 tab(s) orally once a day (before a meal) (20 Aug 2019 08:15)    Allergies    fish (Hives)  No Known Drug Allergies    Intolerances      REVIEW OF SYSTEMS:  CONSTITUTIONAL: No weakness, fevers or chills  EYES/ENT: No visual changes;  No vertigo or throat pain   NECK: No pain or stiffness  RESPIRATORY: No cough, wheezing, hemoptysis; No shortness of breath  CARDIOVASCULAR: chest pain that has resolved.   GASTROINTESTINAL: No abdominal pain. No nausea, vomiting, or hematemesis; No diarrhea or constipation. No melena or hematochezia.  GENITOURINARY: No dysuria, frequency or hematuria  NEUROLOGICAL: No numbness or weakness  SKIN: No itching or rash  All other review of systems is negative unless indicated above    VITAL SIGNS:   Vital Signs Last 24 Hrs  T(C): 37.2 (14 Oct 2019 12:20), Max: 37.2 (14 Oct 2019 12:20)  T(F): 98.9 (14 Oct 2019 12:20), Max: 98.9 (14 Oct 2019 12:20)  HR: 67 (14 Oct 2019 12:20) (67 - 67)  BP: 160/92 (14 Oct 2019 12:20) (160/92 - 160/92)  RR: 18 (14 Oct 2019 12:20) (18 - 18)  SpO2: 98% (14 Oct 2019 12:20) (98% - 98%)  I&O's Summary    On Exam:  Constitutional: NAD, alert and oriented x 3  Lungs:  Non-labored, breath sounds are clear bilaterally, No wheezing, rales or rhonchi  Cardiovascular: RRR.  S1 and S2 positive.  No murmurs, rubs, gallops or clicks  Gastrointestinal: Bowel Sounds present, soft, nontender.   Lymph: non-pitting b/l peripheral edema. No cervical lymphadenopathy.  Neurological: Alert, no focal deficits  Skin: No rashes or ulcers   Psych:  Mood & affect appropriate.    LABS: All Labs Reviewed:                        10.2   8.53  )-----------( 289      ( 14 Oct 2019 13:15 )             34.0       RADIOLOGY:  CXR: clear lungs    EKG: sinus bradycardia

## 2019-10-14 NOTE — ED PROVIDER NOTE - CLINICAL SUMMARY MEDICAL DECISION MAKING FREE TEXT BOX
cp on exerrtion in male doing cardiac rehab followed by dr babb. labs ro pe ro acs ekg xray cards consult

## 2019-10-14 NOTE — CONSULT NOTE ADULT - ASSESSMENT
ASSESSMENT/PLAN: 49 year old male with PAF on Eliquis, RUL subsegmental PE, hx of recent NSTEMI, perforated antral ulcer, osteomyelitis s/p debridement, HTN and DMtype2.     Chest pain, now resolved. He has angina with exertion. No immediate suggestion of MI.   - EKG: sinus bradycardia without ST segment changes  - Tamir are pending.   - d-dimer 259   - Continue Plavix and full dose a/c.  - Continue statin  - 2decho 8/2019 revealing ef 65%, mild to moderate MR, trace TR.  No need to repeat    HTN  - Continue losartan    PAF  - Continue Amiodarone   - Continue Eliquis.  Monitor for overt s/s bleeding  - Watch creatinine and electrolytes. Keep K>4, Mg>2 ASSESSMENT/PLAN: 49 year old male with PAF on Eliquis, RUL subsegmental PE, hx of recent NSTEMI, perforated antral ulcer, osteomyelitis s/p debridement, HTN and DMtype2.     Chest pain, now resolved. He has angina with exertion. No immediate suggestion of MI.   - EKG: sinus bradycardia without ST segment changes  - Tamir are pending.   - d-dimer 259   - s/p FD ASA in ED. Continue Plavix and full dose a/c (Eliquis 5mg bid)   - Continue statin  - 2decho 8/2019 revealing ef 65%, mild to moderate MR, trace TR.  No need to repeat    HTN  - Continue losartan    PAF  - Continue Amiodarone   - Continue Eliquis.  Monitor for overt s/s bleeding  - Watch creatinine and electrolytes. Keep K>4, Mg>2 49 year old male with PAF on Eliquis, RUL subsegmental PE, hx of recent NSTEMI, perforated antral ulcer, osteomyelitis s/p debridement, HTN and DM2, now with an episode of dyspnea and chest pain during exercise.    Chest pain, now resolved. He may have had symptoms suggestion of angina. No immediate suggestion of MI.   - EKG: sinus bradycardia without worrisome changes  - 1st troponin is 0.027 with a normal CK. Will need to send another set in 6 hours.  - d-dimer 259, which is mildly elevated. Given his complicated history, will need to obtain CTA  - s/p FD ASA in ED. Continue Plavix and full dose a/c (Eliquis 5mg bid)   - Continue statin  - 2decho 8/2019 revealing ef 65%, mild to moderate MR, trace TR.  No need to repeat  - if 2 sets of cardiac enzymes are unremarkable, and his further evaluation is negative, he can follow up for outpatient stress test.    HTN  - Continue losartan    PAF  - Continue Amiodarone   - Continue Eliquis.  Monitor for overt s/s bleeding  - Watch creatinine and electrolytes. Keep K>4, Mg>2    - will follow with you if admitted.

## 2019-10-14 NOTE — ED PROVIDER NOTE - CARE PROVIDER_API CALL
Juventino Soto)  Cardiovascular Disease  [address] [place] [address]  Phone: [phone]  Fax: [phone]  Follow Up Time:

## 2019-10-14 NOTE — ED PROVIDER NOTE - FAMILY HISTORY
Father  Still living? Unknown  FH: coronary artery disease, Age at diagnosis: Age Unknown  FH: stroke, Age at diagnosis: Age Unknown     Mother  Still living? Unknown  FH: pulmonary embolism, Age at diagnosis: Age Unknown

## 2019-10-21 ENCOUNTER — EMERGENCY (EMERGENCY)
Facility: HOSPITAL | Age: 49
LOS: 1 days | Discharge: HOME | End: 2019-10-21
Attending: EMERGENCY MEDICINE | Admitting: EMERGENCY MEDICINE
Payer: COMMERCIAL

## 2019-10-21 VITALS
OXYGEN SATURATION: 96 % | DIASTOLIC BLOOD PRESSURE: 88 MMHG | HEART RATE: 77 BPM | HEIGHT: 74 IN | SYSTOLIC BLOOD PRESSURE: 150 MMHG | WEIGHT: 205.03 LBS | TEMPERATURE: 98 F | RESPIRATION RATE: 15 BRPM

## 2019-10-21 VITALS
TEMPERATURE: 99 F | OXYGEN SATURATION: 97 % | RESPIRATION RATE: 16 BRPM | SYSTOLIC BLOOD PRESSURE: 151 MMHG | DIASTOLIC BLOOD PRESSURE: 80 MMHG | HEART RATE: 76 BPM

## 2019-10-21 DIAGNOSIS — Z98.890 OTHER SPECIFIED POSTPROCEDURAL STATES: Chronic | ICD-10-CM

## 2019-10-21 DIAGNOSIS — S98.912A COMPLETE TRAUMATIC AMPUTATION OF LEFT FOOT, LEVEL UNSPECIFIED, INITIAL ENCOUNTER: Chronic | ICD-10-CM

## 2019-10-21 DIAGNOSIS — K25.5 CHRONIC OR UNSPECIFIED GASTRIC ULCER WITH PERFORATION: Chronic | ICD-10-CM

## 2019-10-21 LAB
ALBUMIN SERPL ELPH-MCNC: 3.5 G/DL — SIGNIFICANT CHANGE UP (ref 3.3–5)
ALP SERPL-CCNC: 151 U/L — HIGH (ref 40–120)
ALT FLD-CCNC: 25 U/L — SIGNIFICANT CHANGE UP (ref 12–78)
ANION GAP SERPL CALC-SCNC: 7 MMOL/L — SIGNIFICANT CHANGE UP (ref 5–17)
APPEARANCE UR: ABNORMAL
AST SERPL-CCNC: 18 U/L — SIGNIFICANT CHANGE UP (ref 15–37)
BACTERIA # UR AUTO: ABNORMAL
BASOPHILS # BLD AUTO: 0.05 K/UL — SIGNIFICANT CHANGE UP (ref 0–0.2)
BASOPHILS NFR BLD AUTO: 0.4 % — SIGNIFICANT CHANGE UP (ref 0–2)
BILIRUB SERPL-MCNC: 0.3 MG/DL — SIGNIFICANT CHANGE UP (ref 0.2–1.2)
BILIRUB UR-MCNC: NEGATIVE — SIGNIFICANT CHANGE UP
BUN SERPL-MCNC: 13 MG/DL — SIGNIFICANT CHANGE UP (ref 7–23)
CALCIUM SERPL-MCNC: 8.9 MG/DL — SIGNIFICANT CHANGE UP (ref 8.5–10.1)
CHLORIDE SERPL-SCNC: 104 MMOL/L — SIGNIFICANT CHANGE UP (ref 96–108)
CO2 SERPL-SCNC: 27 MMOL/L — SIGNIFICANT CHANGE UP (ref 22–31)
COLOR SPEC: YELLOW — SIGNIFICANT CHANGE UP
CREAT SERPL-MCNC: 1.2 MG/DL — SIGNIFICANT CHANGE UP (ref 0.5–1.3)
DIFF PNL FLD: ABNORMAL
EOSINOPHIL # BLD AUTO: 0.37 K/UL — SIGNIFICANT CHANGE UP (ref 0–0.5)
EOSINOPHIL NFR BLD AUTO: 3 % — SIGNIFICANT CHANGE UP (ref 0–6)
EPI CELLS # UR: NEGATIVE — SIGNIFICANT CHANGE UP
GLUCOSE SERPL-MCNC: 184 MG/DL — HIGH (ref 70–99)
GLUCOSE UR QL: NEGATIVE — SIGNIFICANT CHANGE UP
HCT VFR BLD CALC: 32.3 % — LOW (ref 39–50)
HGB BLD-MCNC: 9.8 G/DL — LOW (ref 13–17)
IMM GRANULOCYTES NFR BLD AUTO: 0.3 % — SIGNIFICANT CHANGE UP (ref 0–1.5)
KETONES UR-MCNC: NEGATIVE — SIGNIFICANT CHANGE UP
LEUKOCYTE ESTERASE UR-ACNC: ABNORMAL
LYMPHOCYTES # BLD AUTO: 1.08 K/UL — SIGNIFICANT CHANGE UP (ref 1–3.3)
LYMPHOCYTES # BLD AUTO: 8.7 % — LOW (ref 13–44)
MCHC RBC-ENTMCNC: 25.4 PG — LOW (ref 27–34)
MCHC RBC-ENTMCNC: 30.3 GM/DL — LOW (ref 32–36)
MCV RBC AUTO: 83.7 FL — SIGNIFICANT CHANGE UP (ref 80–100)
MONOCYTES # BLD AUTO: 1.25 K/UL — HIGH (ref 0–0.9)
MONOCYTES NFR BLD AUTO: 10.1 % — SIGNIFICANT CHANGE UP (ref 2–14)
NEUTROPHILS # BLD AUTO: 9.63 K/UL — HIGH (ref 1.8–7.4)
NEUTROPHILS NFR BLD AUTO: 77.5 % — HIGH (ref 43–77)
NITRITE UR-MCNC: NEGATIVE — SIGNIFICANT CHANGE UP
NRBC # BLD: 0 /100 WBCS — SIGNIFICANT CHANGE UP (ref 0–0)
PH UR: 5 — SIGNIFICANT CHANGE UP (ref 5–8)
PLATELET # BLD AUTO: 278 K/UL — SIGNIFICANT CHANGE UP (ref 150–400)
POTASSIUM SERPL-MCNC: 4.5 MMOL/L — SIGNIFICANT CHANGE UP (ref 3.5–5.3)
POTASSIUM SERPL-SCNC: 4.5 MMOL/L — SIGNIFICANT CHANGE UP (ref 3.5–5.3)
PROT SERPL-MCNC: 7.3 G/DL — SIGNIFICANT CHANGE UP (ref 6–8.3)
PROT UR-MCNC: 25 MG/DL
RBC # BLD: 3.86 M/UL — LOW (ref 4.2–5.8)
RBC # FLD: 19.1 % — HIGH (ref 10.3–14.5)
RBC CASTS # UR COMP ASSIST: SIGNIFICANT CHANGE UP /HPF (ref 0–4)
SODIUM SERPL-SCNC: 138 MMOL/L — SIGNIFICANT CHANGE UP (ref 135–145)
SP GR SPEC: 1 — LOW (ref 1.01–1.02)
UROBILINOGEN FLD QL: NEGATIVE — SIGNIFICANT CHANGE UP
WBC # BLD: 12.42 K/UL — HIGH (ref 3.8–10.5)
WBC # FLD AUTO: 12.42 K/UL — HIGH (ref 3.8–10.5)
WBC UR QL: ABNORMAL

## 2019-10-21 PROCEDURE — 51702 INSERT TEMP BLADDER CATH: CPT

## 2019-10-21 PROCEDURE — 74176 CT ABD & PELVIS W/O CONTRAST: CPT

## 2019-10-21 PROCEDURE — 87086 URINE CULTURE/COLONY COUNT: CPT

## 2019-10-21 PROCEDURE — 99284 EMERGENCY DEPT VISIT MOD MDM: CPT

## 2019-10-21 PROCEDURE — 74176 CT ABD & PELVIS W/O CONTRAST: CPT | Mod: 26

## 2019-10-21 PROCEDURE — 96361 HYDRATE IV INFUSION ADD-ON: CPT | Mod: XU

## 2019-10-21 PROCEDURE — 99284 EMERGENCY DEPT VISIT MOD MDM: CPT | Mod: 25

## 2019-10-21 PROCEDURE — 96374 THER/PROPH/DIAG INJ IV PUSH: CPT | Mod: XU

## 2019-10-21 PROCEDURE — 36415 COLL VENOUS BLD VENIPUNCTURE: CPT

## 2019-10-21 PROCEDURE — 87186 SC STD MICRODIL/AGAR DIL: CPT

## 2019-10-21 PROCEDURE — 80053 COMPREHEN METABOLIC PANEL: CPT

## 2019-10-21 PROCEDURE — 81001 URINALYSIS AUTO W/SCOPE: CPT

## 2019-10-21 PROCEDURE — 85027 COMPLETE CBC AUTOMATED: CPT

## 2019-10-21 RX ORDER — CEFUROXIME AXETIL 250 MG
500 TABLET ORAL ONCE
Refills: 0 | Status: COMPLETED | OUTPATIENT
Start: 2019-10-21 | End: 2019-10-21

## 2019-10-21 RX ORDER — OXYCODONE AND ACETAMINOPHEN 5; 325 MG/1; MG/1
1 TABLET ORAL ONCE
Refills: 0 | Status: DISCONTINUED | OUTPATIENT
Start: 2019-10-21 | End: 2019-10-21

## 2019-10-21 RX ORDER — CEFUROXIME AXETIL 250 MG
1 TABLET ORAL
Qty: 14 | Refills: 0
Start: 2019-10-21 | End: 2019-10-27

## 2019-10-21 RX ORDER — KETOROLAC TROMETHAMINE 30 MG/ML
30 SYRINGE (ML) INJECTION ONCE
Refills: 0 | Status: DISCONTINUED | OUTPATIENT
Start: 2019-10-21 | End: 2019-10-21

## 2019-10-21 RX ORDER — SODIUM CHLORIDE 9 MG/ML
1000 INJECTION INTRAMUSCULAR; INTRAVENOUS; SUBCUTANEOUS ONCE
Refills: 0 | Status: COMPLETED | OUTPATIENT
Start: 2019-10-21 | End: 2019-10-21

## 2019-10-21 RX ADMIN — Medication 30 MILLIGRAM(S): at 06:40

## 2019-10-21 RX ADMIN — Medication 500 MILLIGRAM(S): at 05:34

## 2019-10-21 RX ADMIN — SODIUM CHLORIDE 1000 MILLILITER(S): 9 INJECTION INTRAMUSCULAR; INTRAVENOUS; SUBCUTANEOUS at 06:08

## 2019-10-21 RX ADMIN — Medication 30 MILLIGRAM(S): at 06:24

## 2019-10-21 RX ADMIN — OXYCODONE AND ACETAMINOPHEN 1 TABLET(S): 5; 325 TABLET ORAL at 05:36

## 2019-10-21 RX ADMIN — SODIUM CHLORIDE 1000 MILLILITER(S): 9 INJECTION INTRAMUSCULAR; INTRAVENOUS; SUBCUTANEOUS at 07:08

## 2019-10-21 RX ADMIN — OXYCODONE AND ACETAMINOPHEN 1 TABLET(S): 5; 325 TABLET ORAL at 04:36

## 2019-10-21 NOTE — ED ADULT NURSE NOTE - NSIMPLEMENTINTERV_GEN_ALL_ED
Implemented All Universal Safety Interventions:  [place] to call system. Call bell, personal items and telephone within reach. Instruct patient to call for assistance. Room bathroom lighting operational. Non-slip footwear when patient is off stretcher. Physically safe environment: no spills, clutter or unnecessary equipment. Stretcher in lowest position, wheels locked, appropriate side rails in place.

## 2019-10-21 NOTE — ED PROVIDER NOTE - CARE PLAN
Principal Discharge DX:	Urinary retention due to benign prostatic hyperplasia Principal Discharge DX:	Urinary retention due to benign prostatic hyperplasia  Secondary Diagnosis:	UTI (urinary tract infection)  Secondary Diagnosis:	Kidney stone on right side

## 2019-10-21 NOTE — ED PROVIDER NOTE - PATIENT PORTAL LINK FT
You can access the FollowMyHealth Patient Portal offered by [facility] by registering at the following website: [email or web address]. By joining [facility]’s FollowMyHealth portal, you will also be able to view your health information using other applications (apps) compatible with our system.

## 2019-10-21 NOTE — ED ADULT NURSE NOTE - PMH
Afib    BPH (benign prostatic hyperplasia)    CAD S/P percutaneous coronary angioplasty    Cerebrovascular accident    Diabetes    Diabetes mellitus with no complication    History of non-ST elevation myocardial infarction (NSTEMI)    Hypertension    Osteomyelitis  s/p debridement  Perforated gastric ulcer  s/p emergent ex-lap omentopexy and plication 6/2019  Pulmonary embolism

## 2019-10-21 NOTE — ED ADULT NURSE NOTE - ED STAT RN HANDOFF DETAILS
Handoff given to Concetta LOVELACE. Pt is ready for discharge. Talked to [facility] MYLES Thompson. Prescription for ABT to be sent with patient. AO4. Resting in bed. Reports relief in pain. Aguilera draining slow.

## 2019-10-21 NOTE — ED PROVIDER NOTE - PROGRESS NOTE DETAILS
pt reevaluted, feeling better, pain has improved with toradol, pt to be d/c home with abx, follow up with pmd, return if any symptoms worsen

## 2019-10-21 NOTE — ED ADULT NURSE NOTE - OBJECTIVE STATEMENT
Received from Rehab with c/o severe back pain and a non draining Aguilera inserted yesterday due to Urinary retention. Patient is AO4, Noted to be writhing in pain. Aguilera changed, not draining. Flushed with Normal saline. Flow not good. Bladder scan showed only 133ml. Urine sent. To be taken for CT scan.

## 2019-10-21 NOTE — ED PROVIDER NOTE - OBJECTIVE STATEMENT
48yo male bib ems with urinary retention. pt has a jacques and is in rehab and the jacques has not putout any urine in several hours, pt c/o pain with pressure, no vomiting, no fever, no other copmalints

## 2019-10-23 LAB
-  AMIKACIN: SIGNIFICANT CHANGE UP
-  AMPICILLIN/SULBACTAM: SIGNIFICANT CHANGE UP
-  AMPICILLIN: SIGNIFICANT CHANGE UP
-  AZTREONAM: SIGNIFICANT CHANGE UP
-  CEFAZOLIN: SIGNIFICANT CHANGE UP
-  CEFEPIME: SIGNIFICANT CHANGE UP
-  CEFOXITIN: SIGNIFICANT CHANGE UP
-  CEFTRIAXONE: SIGNIFICANT CHANGE UP
-  CIPROFLOXACIN: SIGNIFICANT CHANGE UP
-  GENTAMICIN: SIGNIFICANT CHANGE UP
-  LEVOFLOXACIN: SIGNIFICANT CHANGE UP
-  MEROPENEM: SIGNIFICANT CHANGE UP
-  NITROFURANTOIN: SIGNIFICANT CHANGE UP
-  PIPERACILLIN/TAZOBACTAM: SIGNIFICANT CHANGE UP
-  TOBRAMYCIN: SIGNIFICANT CHANGE UP
-  TRIMETHOPRIM/SULFAMETHOXAZOLE: SIGNIFICANT CHANGE UP
CULTURE RESULTS: SIGNIFICANT CHANGE UP
METHOD TYPE: SIGNIFICANT CHANGE UP
ORGANISM # SPEC MICROSCOPIC CNT: SIGNIFICANT CHANGE UP
ORGANISM # SPEC MICROSCOPIC CNT: SIGNIFICANT CHANGE UP
SPECIMEN SOURCE: SIGNIFICANT CHANGE UP

## 2019-10-25 ENCOUNTER — INPATIENT (INPATIENT)
Facility: HOSPITAL | Age: 49
LOS: 5 days | Discharge: NURSING, REHAB OR LONG-TERM CARE | DRG: 556 | End: 2019-10-31
Attending: INTERNAL MEDICINE | Admitting: INTERNAL MEDICINE
Payer: COMMERCIAL

## 2019-10-25 VITALS
SYSTOLIC BLOOD PRESSURE: 123 MMHG | DIASTOLIC BLOOD PRESSURE: 72 MMHG | OXYGEN SATURATION: 94 % | WEIGHT: 205.03 LBS | HEIGHT: 70 IN | RESPIRATION RATE: 19 BRPM | HEART RATE: 79 BPM

## 2019-10-25 DIAGNOSIS — N40.0 BENIGN PROSTATIC HYPERPLASIA WITHOUT LOWER URINARY TRACT SYMPTOMS: ICD-10-CM

## 2019-10-25 DIAGNOSIS — S98.912A COMPLETE TRAUMATIC AMPUTATION OF LEFT FOOT, LEVEL UNSPECIFIED, INITIAL ENCOUNTER: Chronic | ICD-10-CM

## 2019-10-25 DIAGNOSIS — I63.9 CEREBRAL INFARCTION, UNSPECIFIED: ICD-10-CM

## 2019-10-25 DIAGNOSIS — K25.5 CHRONIC OR UNSPECIFIED GASTRIC ULCER WITH PERFORATION: Chronic | ICD-10-CM

## 2019-10-25 DIAGNOSIS — I26.99 OTHER PULMONARY EMBOLISM WITHOUT ACUTE COR PULMONALE: ICD-10-CM

## 2019-10-25 DIAGNOSIS — Z29.9 ENCOUNTER FOR PROPHYLACTIC MEASURES, UNSPECIFIED: ICD-10-CM

## 2019-10-25 DIAGNOSIS — E11.9 TYPE 2 DIABETES MELLITUS WITHOUT COMPLICATIONS: ICD-10-CM

## 2019-10-25 DIAGNOSIS — R53.1 WEAKNESS: ICD-10-CM

## 2019-10-25 DIAGNOSIS — N39.0 URINARY TRACT INFECTION, SITE NOT SPECIFIED: ICD-10-CM

## 2019-10-25 DIAGNOSIS — Z98.890 OTHER SPECIFIED POSTPROCEDURAL STATES: Chronic | ICD-10-CM

## 2019-10-25 DIAGNOSIS — I25.2 OLD MYOCARDIAL INFARCTION: ICD-10-CM

## 2019-10-25 DIAGNOSIS — I48.91 UNSPECIFIED ATRIAL FIBRILLATION: ICD-10-CM

## 2019-10-25 DIAGNOSIS — I25.10 ATHEROSCLEROTIC HEART DISEASE OF NATIVE CORONARY ARTERY WITHOUT ANGINA PECTORIS: ICD-10-CM

## 2019-10-25 DIAGNOSIS — I10 ESSENTIAL (PRIMARY) HYPERTENSION: ICD-10-CM

## 2019-10-25 LAB
ALBUMIN SERPL ELPH-MCNC: 2.6 G/DL — LOW (ref 3.3–5)
ALP SERPL-CCNC: 105 U/L — SIGNIFICANT CHANGE UP (ref 40–120)
ALT FLD-CCNC: 40 U/L — SIGNIFICANT CHANGE UP (ref 12–78)
ANION GAP SERPL CALC-SCNC: 10 MMOL/L — SIGNIFICANT CHANGE UP (ref 5–17)
APTT BLD: 37.2 SEC — HIGH (ref 28.5–37)
AST SERPL-CCNC: 30 U/L — SIGNIFICANT CHANGE UP (ref 15–37)
BASOPHILS # BLD AUTO: 0.03 K/UL — SIGNIFICANT CHANGE UP (ref 0–0.2)
BASOPHILS NFR BLD AUTO: 0.4 % — SIGNIFICANT CHANGE UP (ref 0–2)
BILIRUB SERPL-MCNC: 0.3 MG/DL — SIGNIFICANT CHANGE UP (ref 0.2–1.2)
BLD GP AB SCN SERPL QL: SIGNIFICANT CHANGE UP
BUN SERPL-MCNC: 21 MG/DL — SIGNIFICANT CHANGE UP (ref 7–23)
CALCIUM SERPL-MCNC: 8.5 MG/DL — SIGNIFICANT CHANGE UP (ref 8.5–10.1)
CHLORIDE SERPL-SCNC: 107 MMOL/L — SIGNIFICANT CHANGE UP (ref 96–108)
CK MB BLD-MCNC: <2.3 % — SIGNIFICANT CHANGE UP (ref 0–3.5)
CK MB CFR SERPL CALC: <1 NG/ML — SIGNIFICANT CHANGE UP (ref 0–3.6)
CK SERPL-CCNC: 44 U/L — SIGNIFICANT CHANGE UP (ref 26–308)
CO2 SERPL-SCNC: 26 MMOL/L — SIGNIFICANT CHANGE UP (ref 22–31)
CREAT SERPL-MCNC: 1.1 MG/DL — SIGNIFICANT CHANGE UP (ref 0.5–1.3)
EOSINOPHIL # BLD AUTO: 0.21 K/UL — SIGNIFICANT CHANGE UP (ref 0–0.5)
EOSINOPHIL NFR BLD AUTO: 2.7 % — SIGNIFICANT CHANGE UP (ref 0–6)
GLUCOSE SERPL-MCNC: 90 MG/DL — SIGNIFICANT CHANGE UP (ref 70–99)
HCT VFR BLD CALC: 32.6 % — LOW (ref 39–50)
HGB BLD-MCNC: 9.9 G/DL — LOW (ref 13–17)
IMM GRANULOCYTES NFR BLD AUTO: 0.5 % — SIGNIFICANT CHANGE UP (ref 0–1.5)
INR BLD: 1.7 RATIO — HIGH (ref 0.88–1.16)
LYMPHOCYTES # BLD AUTO: 1.09 K/UL — SIGNIFICANT CHANGE UP (ref 1–3.3)
LYMPHOCYTES # BLD AUTO: 13.9 % — SIGNIFICANT CHANGE UP (ref 13–44)
MCHC RBC-ENTMCNC: 25.1 PG — LOW (ref 27–34)
MCHC RBC-ENTMCNC: 30.4 GM/DL — LOW (ref 32–36)
MCV RBC AUTO: 82.7 FL — SIGNIFICANT CHANGE UP (ref 80–100)
MONOCYTES # BLD AUTO: 1.29 K/UL — HIGH (ref 0–0.9)
MONOCYTES NFR BLD AUTO: 16.5 % — HIGH (ref 2–14)
NEUTROPHILS # BLD AUTO: 5.16 K/UL — SIGNIFICANT CHANGE UP (ref 1.8–7.4)
NEUTROPHILS NFR BLD AUTO: 66 % — SIGNIFICANT CHANGE UP (ref 43–77)
NRBC # BLD: 0 /100 WBCS — SIGNIFICANT CHANGE UP (ref 0–0)
PLATELET # BLD AUTO: 278 K/UL — SIGNIFICANT CHANGE UP (ref 150–400)
POTASSIUM SERPL-MCNC: 3.8 MMOL/L — SIGNIFICANT CHANGE UP (ref 3.5–5.3)
POTASSIUM SERPL-SCNC: 3.8 MMOL/L — SIGNIFICANT CHANGE UP (ref 3.5–5.3)
PROT SERPL-MCNC: 6.8 G/DL — SIGNIFICANT CHANGE UP (ref 6–8.3)
PROTHROM AB SERPL-ACNC: 19.5 SEC — HIGH (ref 10–12.9)
RBC # BLD: 3.94 M/UL — LOW (ref 4.2–5.8)
RBC # FLD: 19.5 % — HIGH (ref 10.3–14.5)
SODIUM SERPL-SCNC: 143 MMOL/L — SIGNIFICANT CHANGE UP (ref 135–145)
TROPONIN I SERPL-MCNC: 0.03 NG/ML — SIGNIFICANT CHANGE UP (ref 0.01–0.04)
WBC # BLD: 7.82 K/UL — SIGNIFICANT CHANGE UP (ref 3.8–10.5)
WBC # FLD AUTO: 7.82 K/UL — SIGNIFICANT CHANGE UP (ref 3.8–10.5)

## 2019-10-25 PROCEDURE — 70450 CT HEAD/BRAIN W/O DYE: CPT | Mod: 26,59

## 2019-10-25 PROCEDURE — 71045 X-RAY EXAM CHEST 1 VIEW: CPT | Mod: 26

## 2019-10-25 PROCEDURE — 70496 CT ANGIOGRAPHY HEAD: CPT | Mod: 26

## 2019-10-25 PROCEDURE — 93010 ELECTROCARDIOGRAM REPORT: CPT

## 2019-10-25 PROCEDURE — 99285 EMERGENCY DEPT VISIT HI MDM: CPT

## 2019-10-25 PROCEDURE — 70498 CT ANGIOGRAPHY NECK: CPT | Mod: 26

## 2019-10-25 RX ORDER — DEXTROSE 50 % IN WATER 50 %
25 SYRINGE (ML) INTRAVENOUS ONCE
Refills: 0 | Status: COMPLETED | OUTPATIENT
Start: 2019-10-25 | End: 2019-10-25

## 2019-10-25 RX ORDER — TAMSULOSIN HYDROCHLORIDE 0.4 MG/1
0.4 CAPSULE ORAL AT BEDTIME
Refills: 0 | Status: DISCONTINUED | OUTPATIENT
Start: 2019-10-25 | End: 2019-10-31

## 2019-10-25 RX ORDER — ISOSORBIDE MONONITRATE 60 MG/1
30 TABLET, EXTENDED RELEASE ORAL DAILY
Refills: 0 | Status: DISCONTINUED | OUTPATIENT
Start: 2019-10-25 | End: 2019-10-31

## 2019-10-25 RX ORDER — PANTOPRAZOLE SODIUM 20 MG/1
40 TABLET, DELAYED RELEASE ORAL
Refills: 0 | Status: DISCONTINUED | OUTPATIENT
Start: 2019-10-25 | End: 2019-10-31

## 2019-10-25 RX ORDER — FUROSEMIDE 40 MG
1 TABLET ORAL
Qty: 0 | Refills: 0 | DISCHARGE

## 2019-10-25 RX ORDER — SUCRALFATE 1 G
1 TABLET ORAL
Refills: 0 | Status: DISCONTINUED | OUTPATIENT
Start: 2019-10-25 | End: 2019-10-31

## 2019-10-25 RX ORDER — ATORVASTATIN CALCIUM 80 MG/1
80 TABLET, FILM COATED ORAL AT BEDTIME
Refills: 0 | Status: DISCONTINUED | OUTPATIENT
Start: 2019-10-25 | End: 2019-10-31

## 2019-10-25 RX ORDER — LOSARTAN POTASSIUM 100 MG/1
25 TABLET, FILM COATED ORAL DAILY
Refills: 0 | Status: DISCONTINUED | OUTPATIENT
Start: 2019-10-25 | End: 2019-10-26

## 2019-10-25 RX ORDER — AMIODARONE HYDROCHLORIDE 400 MG/1
100 TABLET ORAL DAILY
Refills: 0 | Status: DISCONTINUED | OUTPATIENT
Start: 2019-10-25 | End: 2019-10-27

## 2019-10-25 NOTE — H&P ADULT - HISTORY OF PRESENT ILLNESS
49M with PMH CAD s/p BMS to RCA (8/2019), RUL subsegmental PE (6/2019, on Eliquis), hx of NSTEMI and PAF s/p ex-lap omentopexy and plication for perforated antral ulcer (5/2019), osteomyelitis s/p debridement,  HTN and DM2 (not on insulin) presenting to the ED     In the ED:   Vitals: /72 HR 79 RR 19 SpO2 94 % @RA  Labs: h/h 9.9/32.6, BMP WNL except for alb 2.6. CE x1 normal   CTA head:   1. Widely patent head and neck vasculature. No intraluminal thrombus or   dissection noted. 2. Incidental right MCA bifurcation aneurysm. Interval reassessment and   follow-up is recommended.  CT head:  Mild chronic microvascular changes without evidence of an   acute transcortical infarction or hemorrhage. Patient is a 48 yo male with PMHx significant of CAD s/p BMS to RCA (8/2019), RUL subsegmental PE (6/2019, on Eliquis), hx of NSTEMI and PAF s/p ex-lap omentopexy and plication for perforated antral ulcer (5/2019), osteomyelitis s/p debridement, CVA s/p tPA (8/2019), HTN and DM2 (not on insulin) presenting to the ED with right sided weakness, facial droop, and slurred speech. Patient states that around 530pm today he suddenly experienced blurred vision, right sided weakness, and right sided facial drooping. He is currently in a rehab facility. There he notified his roommate who alerted the nurse, who then called for a transport to the emergency room. Patient was not a candidate for tPA due to his comorbidities. Patient endorses SOB at the time he was experiencing his symptoms of weakness and facial drooping. He is currently being treated for a UTI with ciprofloxacin(day 2) that was diagnosed earlier this week. Of note, in Aug of this year he presented with similar symptoms that was treated with tPA. Since then, the patient states that he was able to recover all weakness except for right lower extremity. He currently receives PT/OT 5x a week in the rehab facility. Patient denies chest pain, SOB, fever, chills, vomiting and diarrhea. Patient endorses nausea and mild abdominal pain that he says is resolving since his diagnoses of UTI. He has a chronic urinary catheter in place for his chronic urinary retention.         In the ED:   Vitals: /72 HR 79 RR 19 SpO2 94 % @RA  Labs: h/h 9.9/32.6, BMP WNL except for alb 2.6. CE x1 normal   CTA head:   1. Widely patent head and neck vasculature. No intraluminal thrombus or   dissection noted. 2. Incidental right MCA bifurcation aneurysm. Interval reassessment and   follow-up is recommended.  CT head:  Mild chronic microvascular changes without evidence of an   acute transcortical infarction or hemorrhage. Patient is a 48 yo male with PMHx significant of CAD s/p BMS to RCA (8/2019), RUL subsegmental PE (6/2019, on Eliquis), hx of NSTEMI and PAF s/p ex-lap omentopexy and plication for perforated antral ulcer (5/2019), osteomyelitis s/p debridement, CVA s/p tPA (8/2019), HTN and DM2 (not on insulin) presenting to the ED with right sided weakness, facial droop, and slurred speech. Patient states that around 530pm today he suddenly experienced blurred vision, right sided weakness, and right sided facial drooping. He is currently in a rehab facility. There he notified his roommate who alerted the nurse, who then called for a transport to the emergency room. Patient was not a candidate for tPA due to his comorbidities. Patient endorses SOB at the time he was experiencing his symptoms of weakness and facial drooping. He is currently being treated for a UTI with ciprofloxacin(day 2) that was diagnosed earlier this week. Of note, in Aug of this year he presented with similar symptoms that was treated with tPA. Since then, the patient states that he was able to recover all weakness except for right lower extremity. He currently receives PT/OT 5x a week in the rehab facility. Patient denies chest pain, SOB, fever, chills, vomiting and diarrhea. Patient endorses nausea and mild abdominal pain that he says is resolving since his diagnoses of UTI. He has a chronic urinary catheter in place for his chronic urinary retention.         In the ED:   Vitals: /72 HR 79 RR 19 SpO2 94 % @RA  Labs: h/h 9.9/32.6, BMP WNL except for alb 2.6. CE x1 normal   EKG Sinus rhythm with premature atrial complexes   CTA head:   1. Widely patent head and neck vasculature. No intraluminal thrombus or   dissection noted. 2. Incidental right MCA bifurcation aneurysm. Interval reassessment and   follow-up is recommended.  CT head:  Mild chronic microvascular changes without evidence of an   acute transcortical infarction or hemorrhage.

## 2019-10-25 NOTE — H&P ADULT - PROBLEM SELECTOR PLAN 5
- NSTEMI s/p ex-lap omentopexy and plication in 5/2019 of perforated ulcer  - stable angina at times, non present currently on admission   - Continue atorvastatin 80mg at home - NSTEMI s/p ex-lap omentopexy and plication in 5/2019 of perforated ulcer  - stable angina at times, non present currently on admission   - Continue isosorbide and atorvastatin 80mg at home

## 2019-10-25 NOTE — H&P ADULT - PROBLEM SELECTOR PLAN 4
- Chronic, PAF, HR controlled   - Patient on amiodarone 100mg QD and Eliquis 5mg BID  - Continue home medication

## 2019-10-25 NOTE — H&P ADULT - NSHPPHYSICALEXAM_GEN_ALL_CORE
T(C): --  HR: 79 (10-25-19 @ 19:13) (79 - 79)  BP: 123/72 (10-25-19 @ 19:13) (123/72 - 123/72)  RR: 19 (10-25-19 @ 19:13) (19 - 19)  SpO2: 94% (10-25-19 @ 19:13) (94% - 94%)    GENERAL: patient appears well, no acute distress, appropriate, facial drooping resolved  EYES: sclera clear  ENMT: moist mucous membranes, poor dentition  NECK: supple, soft   LUNGS: clear to auscultation, symmetric breath sounds, no wheezing or rhonchi appreciated  HEART:  S1/S2, regular rate and rhythm, no murmurs noted, no lower extremity edema  GASTROINTESTINAL: abdomen is soft, mildly discomfort to palpation on right lower abdomen, nondistended, normoactive bowel sounds  INTEGUMENT: good skin turgor  MUSCULOSKELETAL: no clubbing or cyanosis, no obvious deformity  NEUROLOGIC: awake, alert, oriented x3, T(C): --  HR: 79 (10-25-19 @ 19:13) (79 - 79)  BP: 123/72 (10-25-19 @ 19:13) (123/72 - 123/72)  RR: 19 (10-25-19 @ 19:13) (19 - 19)  SpO2: 94% (10-25-19 @ 19:13) (94% - 94%)    GENERAL: patient appears well, no acute distress, appropriate, facial drooping resolved  EYES: sclera clear  ENMT: moist mucous membranes, poor dentition  NECK: supple, soft   LUNGS: clear to auscultation, symmetric breath sounds, no wheezing or rhonchi appreciated  HEART:  S1/S2, regular rate and rhythm, no murmurs noted, no lower extremity edema  GASTROINTESTINAL: abdomen is soft, mildly discomfort to palpation on right lower abdomen, nondistended, normoactive bowel sounds  INTEGUMENT: good skin turgor  MUSCULOSKELETAL: no clubbing or cyanosis, no obvious deformity  NEUROLOGIC: awake, alert, oriented x3, RUE and RLE weakness, RUE 2+ and RLE 1+, minimal right sided facial decreased sensation compared to left

## 2019-10-25 NOTE — ED PROVIDER NOTE - ATTENDING CONTRIBUTION TO CARE
(snf notes reviewed) pt is a 50 yo male who is currently at a snf for rehab, he is there sp stroke where he got tpa at this facility.  he has hx of cad ptca, dm, sp perforated viscus withemergency surgery and subsequent hernia, pe on eliquis htn notifed snf staff that at 5:45 pm tonight he had sudden onset of sided weakness  on eval:  chronically ill but awake alert no distress w male able to open and close his eyes, normal speech no loss of nasolabial fold but when he is asked to put his tongue out he pushes it to the LEFT not the right as it would be if there was r sided weakness, when asked to do shoulder shrugh (cn XI) there is limited effort   he does not grab my hand with enormous strength but is able to use his r hand and left   he does not pick his arm or leg up off the bed  heent otherwise normal  cor rrr   chest cta  abd scars hernia no pain  ext as noted neuro nihss=0  with conflicting exam  plan: cta ct labs accuchec ro ich ro mass bleed ro hypoglycemia ro infection  on eliquis at snf- also just got tpa not a candidate for TPA

## 2019-10-25 NOTE — ED PROVIDER NOTE - CLINICAL SUMMARY MEDICAL DECISION MAKING FREE TEXT BOX
48 y/o male, chronically ill, recent possible stroke although MRI negative, received TPA here at this hospital presents to the ED for right sided facial weakness and RUE and RLE weakness. exam inconsistent, poor effort. A+ O x 3, right side with weakness but poor effort. tongue deviating to the left. patient already on AC not candidtate for TPA. cth/cta head and neck/labs likely admit 50 y/o male, chronically ill, recent possible stroke although MRI negative, received TPA here at this hospital presents to the ED for right sided facial weakness and RUE and RLE weakness. exam inconsistent, poor effort. A+ O x 3, right side with weakness but poor effort. tongue deviating to the left. patient already on AC not candidate for TPA. cth/cta head and neck/labs likely admit

## 2019-10-25 NOTE — H&P ADULT - PROBLEM SELECTOR PLAN 9
- Patient has chronic urinary retention, currently treated with urinary catheterization  - Continue tamsulosin 0.4mg daily

## 2019-10-25 NOTE — H&P ADULT - PROBLEM SELECTOR PLAN 1
- Patient admitted for CVA. Right sided facial drooping (improved while in ER), Right sided weakness in upper and lower extremity.  - Patient had previous CVA on 8/2019 s/p TPA in the ED (around 22:00)  - Patient did not receive TPA this admission, currently on Eliquis and clopidogrel  - CT head shows no intraluminal thrombus or dissection. Incidental right MCA bifurcation aneurysm. Mild chronic microvascular changes without evidence of an   acute transcortical infarction or hemorrhage.   - MRI ordered, f/u results   - cont neuro checks q4hr for now  - order lipid panel, f/u results  - HbA1C was 8.3 on 8/1/2019  - c/w atorvastatin 80mg   -speech and swallow eval (passed bedside swallow in the ED- will start clear liquids tonight)  - cont PT eval/ OT eval  - Dr. Jon, neuro, consulted in ED, f/u recs

## 2019-10-25 NOTE — H&P ADULT - NSHPSOCIALHISTORY_GEN_ALL_CORE
Patient is currently in a rehab facility Patient is currently in a rehab facility since discharge after CVA in (8/2019), currently ambulates with a rolling walker, receives PT/OT 5 days a week  Tobacco- Denies smoking since May 2019  Alcohol- Denies   Illicit Drug Use- Denies

## 2019-10-25 NOTE — ED ADULT NURSE NOTE - OBJECTIVE STATEMENT
Pt here from [facility] with right side facial droop and right side weakness, pt denies pain at this time, has a chronic jacques, denies any other symptoms, pt is alert and oriented x 4.

## 2019-10-25 NOTE — ED ADULT NURSE NOTE - NSIMPLEMENTINTERV_GEN_ALL_ED
Implemented All Fall with Harm Risk Interventions:  [place] to call system. Call bell, personal items and telephone within reach. Instruct patient to call for assistance. Room bathroom lighting operational. Non-slip footwear when patient is off stretcher. Physically safe environment: no spills, clutter or unnecessary equipment. Stretcher in lowest position, wheels locked, appropriate side rails in place. Provide visual cue, wrist band, yellow gown, etc. Monitor gait and stability. Monitor for mental status changes and reorient to person, place, and time. Review medications for side effects contributing to fall risk. Reinforce activity limits and safety measures with patient and family. Provide visual clues: red socks.

## 2019-10-25 NOTE — H&P ADULT - PROBLEM SELECTOR PLAN 3
- Patient stated he was diagnosed with UTI earlier in the week   - Patient is currently treated with cefuroxime, symptoms are resolving  - Continue to medications of ceferuximime - Patient stated he was diagnosed with UTI earlier in the week   - Patient is currently treated with cefuroxime, symptoms are resolving  - Continue to medications of cefuroxime 500 twice a day

## 2019-10-25 NOTE — H&P ADULT - ASSESSMENT
49M with PMH CAD s/p BMS to RCA (8/2019), RUL subsegmental PE (6/2019, on Eliquis), hx of NSTEMI and PAF s/p ex-lap omentopexy and plication for perforated antral ulcer (5/2019), osteomyelitis s/p debridement,  HTN and DM2 (not on insulin) presenting to the ED after having slurred speech and R sided weakness admitted for CVA 49M with PMH CAD s/p BMS to RCA (8/2019), RUL subsegmental PE (6/2019, on Eliquis), hx of NSTEMI and PAF s/p ex-lap omentopexy and plication for perforated antral ulcer (5/2019), osteomyelitis s/p debridement,  HTN and DM2 (not on insulin) presenting to the ED after having slurred speech and R sided weakness. Patient admitted for CVA.

## 2019-10-25 NOTE — ED PROVIDER NOTE - PROGRESS NOTE DETAILS
dr solorio paged for consult as he saw pt last visit  aware at 19:28 radiology called to report primary screening ct was negative for bleed mass or path radiology called to report primary screening ct was negative for bleed mass or path  pt moving arms and legs, sticking tongue out and to the right and left, no distress using hands normally he is aware of the results so far stating he feels better. DYSPHAGIA= PASS

## 2019-10-25 NOTE — H&P ADULT - NSHPREVIEWOFSYSTEMS_GEN_ALL_CORE
CONSTITUTIONAL: denies fever, chills, fatigue  HEENT: denies blurred visions currently  SKIN: denies new lesions  CARDIOVASCULAR: denies chest pain, chest pressure, palpitations  RESPIRATORY: denies shortness of breath  GASTROINTESTINAL: admits nausea and mild abdominal pain, denies vomiting, diarrhea  GENITOURINARY: denies dysuria  NEUROLOGICAL: admits to right upper and lower extremity unilateral weakness  MUSCULOSKELETAL: denies new joint pain  HEMATOLOGIC: denies gross bleeding  LYMPHATICS: denies extremity swelling

## 2019-10-25 NOTE — ED PROVIDER NOTE - OBJECTIVE STATEMENT
49M with PMH CAD s/p  RCA (8/2019), RUL subsegmental PE (6/2019, on Eliquis), hx of NSTEMI, afib, PE on eliquis, s/p ex-lap omentopexy and plication for perforated antral ulcer (5/2019), osteomyelitis s/p debridement,  HTN and DM2 (not on insulin) presenting to the ED after having slurred speech and R sided weakness. patient coming in for right sided facial droop that he noticed at 5:45 pm. states he feels like his right side is also week and he was having blurred vision.

## 2019-10-25 NOTE — H&P ADULT - PROBLEM SELECTOR PLAN 8
- Chronic, Stable  - HbA1C 8/1/19 was 8.3  - Patient does not recall what his regimen in rehab  - During his last admission, patient was well controlled on 8 units long acting lantus and 5 units humalog TID   - BG well controlled at this moment, will start on low insulin sliding scale

## 2019-10-25 NOTE — H&P ADULT - PROBLEM SELECTOR PLAN 2
- Patient had bare metal stent placed on 7/31/19  - Patient currently on Eliquis 5 bid and clopidogrel 75mg  - Continue home medication of atorvastatin - Patient had bare metal stent placed on 7/31/19  - Patient currently on Eliquis 5 bid and clopidogrel 75mg (patient states he received morning doses of both medication during date of admission but unaware if he received second dose)   - Continue home medication of atorvastatin - Patient had bare metal stent placed on 7/31/19  - Patient currently on Eliquis 5 bid and clopidogrel 75mg (patient states he received morning dose of both medication during date of admission but unaware if he received second dose) (facility contacted at 12:55am, confirmed received all meds yesterday before transfer)   - Continue eliquis and plavix  - Continue home medication of atorvastatin

## 2019-10-25 NOTE — H&P ADULT - PROBLEM SELECTOR PLAN 7
- Chronic, Stable, Currently 123/72  - Continue home medication of losartan 25mg  - Continue to monitor

## 2019-10-25 NOTE — H&P ADULT - PROBLEM SELECTOR PLAN 6
- Diagnosed with RUL PE in 6/2019  - Stable, patient denies symptoms of SOB at this time   - Cont Eliquis 5mg BID  - Cont to monitor

## 2019-10-25 NOTE — H&P ADULT - PROBLEM SELECTOR PLAN 10
- DVT prophylaxis, continue Eliquis 5mg bid and clopidogrel 75mg  IMPROVE VTE Individual Risk Assessment        RISK                                                          Points  [  ] Previous VTE                                                3  [  ] Thrombophilia                                             2  [  ] Lower limb paralysis                                   2        (unable to hold up >15 seconds)    [  ] Current Cancer                                             2         (within 6 months)  [  ] Immobilization > 24 hrs                              1  [  ] ICU/CCU stay > 24 hours                             1  [  ] Age > 60                                                         1    IMPROVE VTE Score:         [         ]  Total Risk Factor Score:    0 - 1:   Consider IPC  >2 - 3:  Thromboprophylaxis required (enoxaparin or SQ heparin)        >4:   High Risk: Thromboprophylaxis required (enoxaparin or SQ heparin), optional add IPC  **If CONTRAINDICATION to enoxaparin or SQ heparin, USE IPCs**

## 2019-10-25 NOTE — H&P ADULT - NSICDXPASTMEDICALHX_GEN_ALL_CORE_FT
PAST MEDICAL HISTORY:  Afib     BPH (benign prostatic hyperplasia)     CAD S/P percutaneous coronary angioplasty     Cerebrovascular accident     Diabetes     Diabetes mellitus with no complication     History of non-ST elevation myocardial infarction (NSTEMI)     Hypertension     Osteomyelitis s/p debridement    Perforated gastric ulcer s/p emergent ex-lap omentopexy and plication 6/2019    Pulmonary embolism

## 2019-10-26 DIAGNOSIS — E05.90 THYROTOXICOSIS, UNSPECIFIED WITHOUT THYROTOXIC CRISIS OR STORM: ICD-10-CM

## 2019-10-26 PROBLEM — I63.9 CEREBRAL INFARCTION, UNSPECIFIED: Chronic | Status: ACTIVE | Noted: 2019-10-21

## 2019-10-26 LAB
ANION GAP SERPL CALC-SCNC: 8 MMOL/L — SIGNIFICANT CHANGE UP (ref 5–17)
BASOPHILS # BLD AUTO: 0.03 K/UL — SIGNIFICANT CHANGE UP (ref 0–0.2)
BASOPHILS NFR BLD AUTO: 0.4 % — SIGNIFICANT CHANGE UP (ref 0–2)
BUN SERPL-MCNC: 18 MG/DL — SIGNIFICANT CHANGE UP (ref 7–23)
CALCIUM SERPL-MCNC: 8.3 MG/DL — LOW (ref 8.5–10.1)
CHLORIDE SERPL-SCNC: 106 MMOL/L — SIGNIFICANT CHANGE UP (ref 96–108)
CHOLEST SERPL-MCNC: 77 MG/DL — SIGNIFICANT CHANGE UP (ref 10–199)
CO2 SERPL-SCNC: 28 MMOL/L — SIGNIFICANT CHANGE UP (ref 22–31)
CREAT SERPL-MCNC: 0.94 MG/DL — SIGNIFICANT CHANGE UP (ref 0.5–1.3)
EOSINOPHIL # BLD AUTO: 0.27 K/UL — SIGNIFICANT CHANGE UP (ref 0–0.5)
EOSINOPHIL NFR BLD AUTO: 3.8 % — SIGNIFICANT CHANGE UP (ref 0–6)
GLUCOSE SERPL-MCNC: 112 MG/DL — HIGH (ref 70–99)
HCT VFR BLD CALC: 31 % — LOW (ref 39–50)
HDLC SERPL-MCNC: 32 MG/DL — LOW
HGB BLD-MCNC: 9.2 G/DL — LOW (ref 13–17)
IMM GRANULOCYTES NFR BLD AUTO: 0.6 % — SIGNIFICANT CHANGE UP (ref 0–1.5)
LIPID PNL WITH DIRECT LDL SERPL: 32 MG/DL — SIGNIFICANT CHANGE UP
LYMPHOCYTES # BLD AUTO: 1.45 K/UL — SIGNIFICANT CHANGE UP (ref 1–3.3)
LYMPHOCYTES # BLD AUTO: 20.2 % — SIGNIFICANT CHANGE UP (ref 13–44)
MCHC RBC-ENTMCNC: 24.7 PG — LOW (ref 27–34)
MCHC RBC-ENTMCNC: 29.7 GM/DL — LOW (ref 32–36)
MCV RBC AUTO: 83.1 FL — SIGNIFICANT CHANGE UP (ref 80–100)
MONOCYTES # BLD AUTO: 1.07 K/UL — HIGH (ref 0–0.9)
MONOCYTES NFR BLD AUTO: 14.9 % — HIGH (ref 2–14)
NEUTROPHILS # BLD AUTO: 4.32 K/UL — SIGNIFICANT CHANGE UP (ref 1.8–7.4)
NEUTROPHILS NFR BLD AUTO: 60.1 % — SIGNIFICANT CHANGE UP (ref 43–77)
NRBC # BLD: 0 /100 WBCS — SIGNIFICANT CHANGE UP (ref 0–0)
PLATELET # BLD AUTO: 287 K/UL — SIGNIFICANT CHANGE UP (ref 150–400)
POTASSIUM SERPL-MCNC: 3.3 MMOL/L — LOW (ref 3.5–5.3)
POTASSIUM SERPL-SCNC: 3.3 MMOL/L — LOW (ref 3.5–5.3)
RBC # BLD: 3.73 M/UL — LOW (ref 4.2–5.8)
RBC # FLD: 19.6 % — HIGH (ref 10.3–14.5)
SODIUM SERPL-SCNC: 142 MMOL/L — SIGNIFICANT CHANGE UP (ref 135–145)
TOTAL CHOLESTEROL/HDL RATIO MEASUREMENT: 2.4 RATIO — LOW (ref 3.4–9.6)
TRIGL SERPL-MCNC: 65 MG/DL — SIGNIFICANT CHANGE UP (ref 10–149)
TSH SERPL-MCNC: 0.01 UIU/ML — LOW (ref 0.36–3.74)
WBC # BLD: 7.18 K/UL — SIGNIFICANT CHANGE UP (ref 3.8–10.5)
WBC # FLD AUTO: 7.18 K/UL — SIGNIFICANT CHANGE UP (ref 3.8–10.5)

## 2019-10-26 PROCEDURE — 70551 MRI BRAIN STEM W/O DYE: CPT | Mod: 26

## 2019-10-26 RX ORDER — INSULIN GLARGINE 100 [IU]/ML
6 INJECTION, SOLUTION SUBCUTANEOUS AT BEDTIME
Refills: 0 | Status: DISCONTINUED | OUTPATIENT
Start: 2019-10-26 | End: 2019-10-31

## 2019-10-26 RX ORDER — INSULIN LISPRO 100/ML
VIAL (ML) SUBCUTANEOUS AT BEDTIME
Refills: 0 | Status: DISCONTINUED | OUTPATIENT
Start: 2019-10-26 | End: 2019-10-29

## 2019-10-26 RX ORDER — DEXTROSE 50 % IN WATER 50 %
25 SYRINGE (ML) INTRAVENOUS ONCE
Refills: 0 | Status: DISCONTINUED | OUTPATIENT
Start: 2019-10-26 | End: 2019-10-31

## 2019-10-26 RX ORDER — DEXTROSE 50 % IN WATER 50 %
15 SYRINGE (ML) INTRAVENOUS ONCE
Refills: 0 | Status: DISCONTINUED | OUTPATIENT
Start: 2019-10-26 | End: 2019-10-31

## 2019-10-26 RX ORDER — CLOPIDOGREL BISULFATE 75 MG/1
75 TABLET, FILM COATED ORAL DAILY
Refills: 0 | Status: DISCONTINUED | OUTPATIENT
Start: 2019-10-26 | End: 2019-10-31

## 2019-10-26 RX ORDER — APIXABAN 2.5 MG/1
5 TABLET, FILM COATED ORAL EVERY 12 HOURS
Refills: 0 | Status: DISCONTINUED | OUTPATIENT
Start: 2019-10-26 | End: 2019-10-31

## 2019-10-26 RX ORDER — SODIUM CHLORIDE 9 MG/ML
1000 INJECTION, SOLUTION INTRAVENOUS
Refills: 0 | Status: DISCONTINUED | OUTPATIENT
Start: 2019-10-26 | End: 2019-10-31

## 2019-10-26 RX ORDER — CEFUROXIME AXETIL 250 MG
500 TABLET ORAL EVERY 12 HOURS
Refills: 0 | Status: DISCONTINUED | OUTPATIENT
Start: 2019-10-26 | End: 2019-10-26

## 2019-10-26 RX ORDER — GLUCAGON INJECTION, SOLUTION 0.5 MG/.1ML
1 INJECTION, SOLUTION SUBCUTANEOUS ONCE
Refills: 0 | Status: DISCONTINUED | OUTPATIENT
Start: 2019-10-26 | End: 2019-10-31

## 2019-10-26 RX ORDER — DEXTROSE 50 % IN WATER 50 %
12.5 SYRINGE (ML) INTRAVENOUS ONCE
Refills: 0 | Status: DISCONTINUED | OUTPATIENT
Start: 2019-10-26 | End: 2019-10-31

## 2019-10-26 RX ORDER — INSULIN LISPRO 100/ML
VIAL (ML) SUBCUTANEOUS
Refills: 0 | Status: DISCONTINUED | OUTPATIENT
Start: 2019-10-26 | End: 2019-10-29

## 2019-10-26 RX ORDER — APIXABAN 2.5 MG/1
5 TABLET, FILM COATED ORAL EVERY 12 HOURS
Refills: 0 | Status: DISCONTINUED | OUTPATIENT
Start: 2019-10-26 | End: 2019-10-26

## 2019-10-26 RX ORDER — INSULIN LISPRO 100/ML
5 VIAL (ML) SUBCUTANEOUS
Refills: 0 | Status: DISCONTINUED | OUTPATIENT
Start: 2019-10-26 | End: 2019-10-31

## 2019-10-26 RX ADMIN — APIXABAN 5 MILLIGRAM(S): 2.5 TABLET, FILM COATED ORAL at 17:20

## 2019-10-26 RX ADMIN — TAMSULOSIN HYDROCHLORIDE 0.4 MILLIGRAM(S): 0.4 CAPSULE ORAL at 21:49

## 2019-10-26 RX ADMIN — Medication 5 UNIT(S): at 12:00

## 2019-10-26 RX ADMIN — Medication 1 GRAM(S): at 01:47

## 2019-10-26 RX ADMIN — PANTOPRAZOLE SODIUM 40 MILLIGRAM(S): 20 TABLET, DELAYED RELEASE ORAL at 06:16

## 2019-10-26 RX ADMIN — INSULIN GLARGINE 6 UNIT(S): 100 INJECTION, SOLUTION SUBCUTANEOUS at 21:50

## 2019-10-26 RX ADMIN — Medication 1 GRAM(S): at 17:20

## 2019-10-26 RX ADMIN — APIXABAN 5 MILLIGRAM(S): 2.5 TABLET, FILM COATED ORAL at 05:20

## 2019-10-26 RX ADMIN — AMIODARONE HYDROCHLORIDE 100 MILLIGRAM(S): 400 TABLET ORAL at 05:22

## 2019-10-26 RX ADMIN — Medication 1 GRAM(S): at 11:20

## 2019-10-26 RX ADMIN — LOSARTAN POTASSIUM 25 MILLIGRAM(S): 100 TABLET, FILM COATED ORAL at 05:21

## 2019-10-26 RX ADMIN — Medication 500 MILLIGRAM(S): at 05:20

## 2019-10-26 RX ADMIN — Medication 5 UNIT(S): at 17:20

## 2019-10-26 RX ADMIN — ISOSORBIDE MONONITRATE 30 MILLIGRAM(S): 60 TABLET, EXTENDED RELEASE ORAL at 11:20

## 2019-10-26 RX ADMIN — Medication 1 GRAM(S): at 05:20

## 2019-10-26 RX ADMIN — ATORVASTATIN CALCIUM 80 MILLIGRAM(S): 80 TABLET, FILM COATED ORAL at 21:49

## 2019-10-26 RX ADMIN — Medication 2: at 17:20

## 2019-10-26 RX ADMIN — CLOPIDOGREL BISULFATE 75 MILLIGRAM(S): 75 TABLET, FILM COATED ORAL at 11:20

## 2019-10-26 RX ADMIN — Medication 5 UNIT(S): at 07:59

## 2019-10-26 RX ADMIN — Medication 1 MILLIGRAM(S): at 10:24

## 2019-10-26 RX ADMIN — Medication 1: at 12:00

## 2019-10-26 NOTE — OCCUPATIONAL THERAPY INITIAL EVALUATION ADULT - ADDITIONAL COMMENTS
Pt admitted from [facility] where he has been for approx 2 months. He gets PT/OT 5 x week    Prior to Rehab he lives in a home with friends. Enters through the back with 5 steps and a handrail. Flight of 13 inside with handrail. Tub with TTB and GB. has a walker and raised toilet seat. Pt is left handed.

## 2019-10-26 NOTE — PROGRESS NOTE ADULT - SUBJECTIVE AND OBJECTIVE BOX
Patient is a 49y old  Male who presents with a chief complaint of Right sided weakness and facial droop (25 Oct 2019 21:35)  still complains of right arm weakness, but improving since yesterday  denies chest pain, denies shortness of breath, denies palpitations      INTERVAL HPI/OVERNIGHT EVENTS:  T(C): 37 (10-26-19 @ 07:55), Max: 37.1 (10-25-19 @ 23:22)  HR: 59 (10-26-19 @ 07:55) (59 - 80)  BP: 145/75 (10-26-19 @ 07:55) (120/78 - 145/75)  RR: 18 (10-26-19 @ 07:55) (18 - 20)  SpO2: 93% (10-26-19 @ 07:55) (93% - 94%)  Wt(kg): --  I&O's Summary      LABS:                        9.2    7.18  )-----------( 287      ( 26 Oct 2019 07:24 )             31.0     10-26    142  |  106  |  18  ----------------------------<  112<H>  3.3<L>   |  28  |  0.94    Ca    8.3<L>      26 Oct 2019 07:24    TPro  6.8  /  Alb  2.6<L>  /  TBili  0.3  /  DBili  x   /  AST  30  /  ALT  40  /  AlkPhos  105  10-25    PT/INR - ( 25 Oct 2019 19:18 )   PT: 19.5 sec;   INR: 1.70 ratio         PTT - ( 25 Oct 2019 19:18 )  PTT:37.2 sec    CAPILLARY BLOOD GLUCOSE      POCT Blood Glucose.: 164 mg/dL (26 Oct 2019 11:54)  POCT Blood Glucose.: 131 mg/dL (26 Oct 2019 07:48)  POCT Blood Glucose.: 95 mg/dL (25 Oct 2019 19:16)            MEDICATIONS  (STANDING):  aMIOdarone    Tablet 100 milliGRAM(s) Oral daily  apixaban 5 milliGRAM(s) Oral every 12 hours  atorvastatin 80 milliGRAM(s) Oral at bedtime  clopidogrel Tablet 75 milliGRAM(s) Oral daily  dextrose 5%. 1000 milliLiter(s) (50 mL/Hr) IV Continuous <Continuous>  dextrose 50% Injectable 12.5 Gram(s) IV Push once  dextrose 50% Injectable 25 Gram(s) IV Push once  dextrose 50% Injectable 25 Gram(s) IV Push once  insulin glargine Injectable (LANTUS) 6 Unit(s) SubCutaneous at bedtime  insulin lispro (HumaLOG) corrective regimen sliding scale   SubCutaneous three times a day before meals  insulin lispro (HumaLOG) corrective regimen sliding scale   SubCutaneous at bedtime  insulin lispro Injectable (HumaLOG) 5 Unit(s) SubCutaneous three times a day before meals  isosorbide   mononitrate ER Tablet (IMDUR) 30 milliGRAM(s) Oral daily  pantoprazole    Tablet 40 milliGRAM(s) Oral before breakfast  sucralfate 1 Gram(s) Oral four times a day  tamsulosin 0.4 milliGRAM(s) Oral at bedtime    MEDICATIONS  (PRN):  dextrose 40% Gel 15 Gram(s) Oral once PRN Blood Glucose LESS THAN 70 milliGRAM(s)/deciliter  glucagon  Injectable 1 milliGRAM(s) IntraMuscular once PRN Glucose LESS THAN 70 milligrams/deciliter      REVIEW OF SYSTEMS:  CONSTITUTIONAL: No fever, weight loss, or fatigue  EYES: No eye pain, visual disturbances, or discharge  ENMT:  No difficulty hearing, tinnitus, vertigo; No sinus or throat pain  NECK: No pain or stiffness  RESPIRATORY: No cough, wheezing, chills or hemoptysis; No shortness of breath  CARDIOVASCULAR: No chest pain, palpitations, dizziness, or leg swelling  GASTROINTESTINAL: No abdominal or epigastric pain. No nausea, vomiting, or hematemesis; No diarrhea or constipation. No melena or hematochezia.  GENITOURINARY: No dysuria, frequency, hematuria, or incontinence  NEUROLOGICAL: r arm weakness,   SKIN: No itching, burning, rashes, or lesions   LYMPH NODES: No enlarged glands  ENDOCRINE: No heat or cold intolerance; No hair loss  MUSCULOSKELETAL: No joint pain or swelling; No muscle, back, or extremity pain  PSYCHIATRIC: No depression, anxiety, mood swings, or difficulty sleeping  HEME/LYMPH: No easy bruising, or bleeding gums  ALLERY AND IMMUNOLOGIC: No hives or eczema    RADIOLOGY & ADDITIONAL TESTS:    Imaging Personally Reviewed:  [ ] YES  [ ] NO    Consultant(s) Notes Reviewed:  [ ] YES  [ ] NO    PHYSICAL EXAM:  GENERAL: NAD, well-groomed, well-developed  HEAD:  Atraumatic, Normocephalic  EYES: EOMI, PERRLA, conjunctiva and sclera clear  ENMT: No tonsillar erythema, exudates, or enlargement; Moist mucous membranes, Good dentition, No lesions  NECK: Supple, No JVD, Normal thyroid  NERVOUS SYSTEM:  Alert & Oriented X3, Good concentration; Motor Strength right upper ext 3/5  CHEST/LUNG: Clear to percussion bilaterally; No rales, rhonchi, wheezing, or rubs  HEART: Regular rate and rhythm; No murmurs, rubs, or gallops  ABDOMEN: Soft, Nontender, Nondistended; Bowel sounds present  EXTREMITIES:  2+ Peripheral Pulses, No clubbing, cyanosis, or edema  LYMPH: No lymphadenopathy noted  SKIN: No rashes or lesions    Care Discussed with Consultants/Other Providers [ ] YES  [ ] NO

## 2019-10-26 NOTE — PROGRESS NOTE ADULT - PROBLEM SELECTOR PLAN 2
- Patient had bare metal stent placed on 7/31/19  - Patient currently on Eliquis 5 bid and clopidogrel 75mg (patient states he received morning dose of both medication during date of admission but unaware if he received second dose) (facility contacted at 12:55am, confirmed received all meds yesterday before transfer)   - Continue eliquis and plavix  - Continue home medication of atorvastatin

## 2019-10-26 NOTE — OCCUPATIONAL THERAPY INITIAL EVALUATION ADULT - RANGE OF MOTION EXAMINATION, UPPER EXTREMITY
Weakness to RUE: AROM shoulder flexion approx 90' , elbow flexion, GG FMC slowly improving as per patient/bilateral UE Passive ROM was WNL (within normal limits)

## 2019-10-26 NOTE — PROGRESS NOTE ADULT - ASSESSMENT
49M with PMH CAD s/p BMS to RCA (8/2019), RUL subsegmental PE (6/2019, on Eliquis), hx of NSTEMI and PAF s/p ex-lap omentopexy and plication for perforated antral ulcer (5/2019), osteomyelitis s/p debridement,  HTN and DM2 (not on insulin) presenting to the ED after having slurred speech and R sided weakness. Patient admitted for CVA.

## 2019-10-27 LAB
ANION GAP SERPL CALC-SCNC: 8 MMOL/L — SIGNIFICANT CHANGE UP (ref 5–17)
BUN SERPL-MCNC: 16 MG/DL — SIGNIFICANT CHANGE UP (ref 7–23)
CALCIUM SERPL-MCNC: 8.7 MG/DL — SIGNIFICANT CHANGE UP (ref 8.5–10.1)
CHLORIDE SERPL-SCNC: 107 MMOL/L — SIGNIFICANT CHANGE UP (ref 96–108)
CO2 SERPL-SCNC: 29 MMOL/L — SIGNIFICANT CHANGE UP (ref 22–31)
CREAT SERPL-MCNC: 0.93 MG/DL — SIGNIFICANT CHANGE UP (ref 0.5–1.3)
ERYTHROCYTE [SEDIMENTATION RATE] IN BLOOD: 25 MM/HR — HIGH (ref 0–20)
GLUCOSE SERPL-MCNC: 158 MG/DL — HIGH (ref 70–99)
HBA1C BLD-MCNC: 7.4 % — HIGH (ref 4–5.6)
HCT VFR BLD CALC: 30 % — LOW (ref 39–50)
HGB BLD-MCNC: 9.1 G/DL — LOW (ref 13–17)
MCHC RBC-ENTMCNC: 25.3 PG — LOW (ref 27–34)
MCHC RBC-ENTMCNC: 30.3 GM/DL — LOW (ref 32–36)
MCV RBC AUTO: 83.6 FL — SIGNIFICANT CHANGE UP (ref 80–100)
NRBC # BLD: 0 /100 WBCS — SIGNIFICANT CHANGE UP (ref 0–0)
PLATELET # BLD AUTO: 351 K/UL — SIGNIFICANT CHANGE UP (ref 150–400)
POTASSIUM SERPL-MCNC: 3.5 MMOL/L — SIGNIFICANT CHANGE UP (ref 3.5–5.3)
POTASSIUM SERPL-SCNC: 3.5 MMOL/L — SIGNIFICANT CHANGE UP (ref 3.5–5.3)
RBC # BLD: 3.59 M/UL — LOW (ref 4.2–5.8)
RBC # FLD: 19.4 % — HIGH (ref 10.3–14.5)
SODIUM SERPL-SCNC: 144 MMOL/L — SIGNIFICANT CHANGE UP (ref 135–145)
T3 SERPL-MCNC: 97 NG/DL — SIGNIFICANT CHANGE UP (ref 80–200)
T4 AB SER-ACNC: 9.7 UG/DL — SIGNIFICANT CHANGE UP (ref 4.6–12)
TSH SERPL-MCNC: 0.01 UIU/ML — LOW (ref 0.36–3.74)
WBC # BLD: 7.4 K/UL — SIGNIFICANT CHANGE UP (ref 3.8–10.5)
WBC # FLD AUTO: 7.4 K/UL — SIGNIFICANT CHANGE UP (ref 3.8–10.5)

## 2019-10-27 PROCEDURE — 99223 1ST HOSP IP/OBS HIGH 75: CPT

## 2019-10-27 RX ADMIN — Medication 1: at 11:59

## 2019-10-27 RX ADMIN — PANTOPRAZOLE SODIUM 40 MILLIGRAM(S): 20 TABLET, DELAYED RELEASE ORAL at 05:24

## 2019-10-27 RX ADMIN — Medication 5 UNIT(S): at 17:02

## 2019-10-27 RX ADMIN — Medication 5 UNIT(S): at 11:59

## 2019-10-27 RX ADMIN — Medication 1 GRAM(S): at 17:01

## 2019-10-27 RX ADMIN — Medication 1 GRAM(S): at 11:06

## 2019-10-27 RX ADMIN — Medication 5 UNIT(S): at 07:53

## 2019-10-27 RX ADMIN — Medication 2: at 17:02

## 2019-10-27 RX ADMIN — ISOSORBIDE MONONITRATE 30 MILLIGRAM(S): 60 TABLET, EXTENDED RELEASE ORAL at 11:06

## 2019-10-27 RX ADMIN — INSULIN GLARGINE 6 UNIT(S): 100 INJECTION, SOLUTION SUBCUTANEOUS at 21:52

## 2019-10-27 RX ADMIN — AMIODARONE HYDROCHLORIDE 100 MILLIGRAM(S): 400 TABLET ORAL at 05:24

## 2019-10-27 RX ADMIN — Medication 1 GRAM(S): at 23:10

## 2019-10-27 RX ADMIN — APIXABAN 5 MILLIGRAM(S): 2.5 TABLET, FILM COATED ORAL at 05:24

## 2019-10-27 RX ADMIN — CLOPIDOGREL BISULFATE 75 MILLIGRAM(S): 75 TABLET, FILM COATED ORAL at 11:06

## 2019-10-27 RX ADMIN — APIXABAN 5 MILLIGRAM(S): 2.5 TABLET, FILM COATED ORAL at 17:01

## 2019-10-27 RX ADMIN — Medication 1: at 21:53

## 2019-10-27 RX ADMIN — TAMSULOSIN HYDROCHLORIDE 0.4 MILLIGRAM(S): 0.4 CAPSULE ORAL at 21:52

## 2019-10-27 RX ADMIN — Medication 1: at 07:53

## 2019-10-27 RX ADMIN — Medication 1 GRAM(S): at 01:19

## 2019-10-27 RX ADMIN — ATORVASTATIN CALCIUM 80 MILLIGRAM(S): 80 TABLET, FILM COATED ORAL at 21:52

## 2019-10-27 RX ADMIN — Medication 1 GRAM(S): at 05:24

## 2019-10-27 NOTE — PHYSICAL THERAPY INITIAL EVALUATION ADULT - GAIT DEVIATIONS NOTED, PT EVAL
decreased sunshine/decreased weight-shifting ability/decreased stride length/steppage gait, foot drop RLE>LLE/increased time in double stance/decreased step length

## 2019-10-27 NOTE — PHYSICAL THERAPY INITIAL EVALUATION ADULT - ADDITIONAL COMMENTS
Pt has recently been receiving [facility], PT/OT services 5x/week.  As per pt, pt has recently been ambulating 150-200ft at rehab but still is unable to negotiate stairs.   Prior to [facility], pt lived in house with friend. 13 steps inside, 5 ALBINO with handrail.

## 2019-10-27 NOTE — PHYSICAL THERAPY INITIAL EVALUATION ADULT - PHYSICAL ASSIST/NONPHYSICAL ASSIST: GAIT, REHAB EVAL
verbal cues/Cues for safety in enviro/nonverbal cues (demo/gestures)/1 person assist/set-up required

## 2019-10-27 NOTE — PROGRESS NOTE ADULT - SUBJECTIVE AND OBJECTIVE BOX
Neurology Follow up note    SARAH [id number]    HPI:  Patient is a 50 yo male with PMHx significant of CAD s/p BMS to RCA (8/2019), RUL subsegmental PE (6/2019, on Eliquis), hx of NSTEMI and PAF s/p ex-lap omentopexy and plication for perforated antral ulcer (5/2019), osteomyelitis s/p debridement, CVA s/p tPA (8/2019), HTN and DM2 (not on insulin) presenting to the ED with right sided weakness, facial droop, and slurred speech. Patient states that around 530pm today he suddenly experienced blurred vision, right sided weakness, and right sided facial drooping. He is currently in a rehab facility. There he notified his roommate who alerted the nurse, who then called for a transport to the emergency room. Patient was not a candidate for tPA due to his comorbidities. Patient endorses SOB at the time he was experiencing his symptoms of weakness and facial drooping. He is currently being treated for a UTI with ciprofloxacin(day 2) that was diagnosed earlier this week. Of note, in Aug of this year he presented with similar symptoms that was treated with tPA. Since then, the patient states that he was able to recover all weakness except for right lower extremity. He currently receives PT/OT 5x a week in the rehab facility. Patient denies chest pain, SOB, fever, chills, vomiting and diarrhea. Patient endorses nausea and mild abdominal pain that he says is resolving since his diagnoses of UTI. He has a chronic urinary catheter in place for his chronic urinary retention.         In the ED:   Vitals: /72 HR 79 RR 19 SpO2 94 % @RA  Labs: h/h 9.9/32.6, BMP WNL except for alb 2.6. CE x1 normal   EKG Sinus rhythm with premature atrial complexes   CTA head:   1. Widely patent head and neck vasculature. No intraluminal thrombus or   dissection noted. 2. Incidental right MCA bifurcation aneurysm. Interval reassessment and   follow-up is recommended.  CT head:  Mild chronic microvascular changes without evidence of an   acute transcortical infarction or hemorrhage. (25 Oct 2019 21:35)      Interval History - still having right sided weakness    Patient is seen, chart was reviewed and case was discussed with the treatment team.  Pt is not in any distress.   Lying on bed comfortably.   No events reported overnight.   No clinical seizure was reported.      Vital Signs Last 24 Hrs  T(C): 36.8 (27 Oct 2019 12:38), Max: 36.9 (26 Oct 2019 13:15)  T(F): 98.2 (27 Oct 2019 12:38), Max: 98.5 (26 Oct 2019 23:34)  HR: 60 (27 Oct 2019 12:38) (60 - 79)  BP: 117/67 (27 Oct 2019 12:38) (113/68 - 145/72)  BP(mean): --  RR: 16 (27 Oct 2019 12:38) (16 - 20)  SpO2: 97% (27 Oct 2019 12:38) (88% - 97%)        REVIEW OF SYSTEMS:    Constitutional: No fever, weight loss or fatigue  Eyes: No eye pain, visual disturbances, or discharge  ENT:  No difficulty hearing, tinnitus, vertigo; No sinus or throat pain  Neck: No pain or stiffness  Respiratory: No cough, wheezing, chills or hemoptysis  Cardiovascular: No chest pain, palpitations, shortness of breath, dizziness or leg swelling  Gastrointestinal: No abdominal or epigastric pain. No nausea, vomiting or hematemesis;  Genitourinary: No dysuria, frequency, hematuria or incontinence  Neurological: No headaches, memory loss, loss of strength, numbness or tremors  Psychiatric: No depression, anxiety, mood swings or difficulty sleeping  Musculoskeletal: No joint pain or swelling; No muscle, back or extremity pain  Skin: No itching, burning, rashes or lesions   Lymph Nodes: No enlarged glands  Endocrine: No heat or cold intolerance; No hair loss,  Allergy and Immunologic: No hives or eczema    On Neurological Examination:    Mental Status - Pt is alert, awake, oriented X3. Follows commands well and able to answer questions appropriately  .Mood and affect  normal    Speech -  Normal.     Cranial Nerves - Pupils 3 mm equal and reactive to light, extraocular eye movements intact.   Pt has no  facial asymmetry. Facial sensation is intact.    Muscle tone - is normal all over. Moves all extremities equally.     Motor Exam - right hemiparesis; 3/5 ; give away type of weakness.    Sensory Exam -  Pt withdraws all extremities equally on stimulation. No asymmetry seen. No complaints of tingling, numbness.        coordination:    Finger to nose: normal      Deep tendon Reflexes - 2 plus all over.       Neck Supple -  Yes.     MEDICATIONS    apixaban 5 milliGRAM(s) Oral every 12 hours  atorvastatin 80 milliGRAM(s) Oral at bedtime  clopidogrel Tablet 75 milliGRAM(s) Oral daily  dextrose 40% Gel 15 Gram(s) Oral once PRN  dextrose 5%. 1000 milliLiter(s) IV Continuous <Continuous>  dextrose 50% Injectable 12.5 Gram(s) IV Push once  dextrose 50% Injectable 25 Gram(s) IV Push once  dextrose 50% Injectable 25 Gram(s) IV Push once  glucagon  Injectable 1 milliGRAM(s) IntraMuscular once PRN  insulin glargine Injectable (LANTUS) 6 Unit(s) SubCutaneous at bedtime  insulin lispro (HumaLOG) corrective regimen sliding scale   SubCutaneous three times a day before meals  insulin lispro (HumaLOG) corrective regimen sliding scale   SubCutaneous at bedtime  insulin lispro Injectable (HumaLOG) 5 Unit(s) SubCutaneous three times a day before meals  isosorbide   mononitrate ER Tablet (IMDUR) 30 milliGRAM(s) Oral daily  pantoprazole    Tablet 40 milliGRAM(s) Oral before breakfast  sucralfate 1 Gram(s) Oral four times a day  tamsulosin 0.4 milliGRAM(s) Oral at bedtime      Allergies    fish (Hives)  No Known Drug Allergies    Intolerances        LABS:  CBC Full  -  ( 27 Oct 2019 07:19 )  WBC Count : 7.40 K/uL  RBC Count : 3.59 M/uL  Hemoglobin : 9.1 g/dL  Hematocrit : 30.0 %  Platelet Count - Automated : 351 K/uL  Mean Cell Volume : 83.6 fl  Mean Cell Hemoglobin : 25.3 pg  Mean Cell Hemoglobin Concentration : 30.3 gm/dL  Auto Neutrophil # : x  Auto Lymphocyte # : x  Auto Monocyte # : x   : x  Auto Basophil % : x      10-27    144  |  107  |  16  ----------------------------<  158<H>  3.5   |  29  |  0.93    Ca    8.7      27 Oct 2019 07:19    TPro  6.8  /  Alb  2.6<L>  /  TBili  0.3  /  DBili  x   /  AST  30  /  ALT  40  /  AlkPhos  105  10-25    Hemoglobin A1C:   Lipid Panel 10-26 @ 13:26  Total Cholesterol, Serum 77  LDL 32  Triglycerides 65    Vitamin B12     RADIOLOGY    ASSESSMENT AND PLAN:      presented with right sided weakness with slurred speech;  brain mri no acute cva ;TIA vs CONVERSION DISORDER; DOUBT SEIZURE  HYPERTHYROID STATE.    ENDOCRINE EVAL  CONTINUE AC  EEG  Physical therapy evaluation.  OOB to chair/ambulation with assistance only.  Advanced care planning was discussed with family.  Pain is accessed and addressed..  Plan of care was discussed with family. Questions answered.  Would continue to follow.

## 2019-10-27 NOTE — PHYSICAL THERAPY INITIAL EVALUATION ADULT - PERTINENT HX OF CURRENT PROBLEM, REHAB EVAL
As per H&P: "49M with PMH CAD s/p BMS to RCA (8/2019), RUL subsegmental PE (6/2019, on Eliquis), hx of NSTEMI and PAF s/p ex-lap omentopexy and plication for perforated antral ulcer (5/2019), osteomyelitis s/p debridement,  HTN and DM2 (not on insulin) presenting to the ED after having slurred speech and R sided weakness. Patient admitted for CVA."

## 2019-10-27 NOTE — CONSULT NOTE ADULT - ASSESSMENT
Patient is a 50 yo male with PMHx significant of CAD s/p BMS to RCA (8/2019), RUL subsegmental PE (6/2019, on Eliquis), hx of NSTEMI and PAF s/p ex-lap omentopexy and plication for perforated antral ulcer (5/2019), osteomyelitis s/p debridement, CVA s/p tPA (8/2019), HTN and DM2 (not on insulin) presenting to the ED with right sided weakness, facial droop, and slurred speech, ? amiodarone induced hyperthyroidism:    - Continue telemetry monitoring  - D/c amidarone  - Follow up thyroid studies  - Follow up endocrine consult  - WIll consider adding bb.  Patient does not appear thyrotoxic at this point  - For CAD and history of PCI, can continue Plavix 75 QD  - Continue Eliquis for PAF and PE  -Continue high intesity statin  - Continue Imdur  - Losartan held  - To follow

## 2019-10-27 NOTE — PHYSICAL THERAPY INITIAL EVALUATION ADULT - PHYSICAL ASSIST/NONPHYSICAL ASSIST: STAND/SIT, REHAB EVAL
verbal cues/nonverbal cues (demo/gestures)/set-up required/1 person assist/Cues for safety, proper timing for turning

## 2019-10-27 NOTE — PROGRESS NOTE ADULT - SUBJECTIVE AND OBJECTIVE BOX
Patient is a 49y old  Male who presents with a chief complaint of Right sided weakness and facial droop (27 Oct 2019 11:05)  still complains of r sided weakness  denies chest pain, denies palpitations      INTERVAL HPI/OVERNIGHT EVENTS:  T(C): 36.7 (10-27-19 @ 08:09), Max: 36.9 (10-26-19 @ 13:15)  HR: 64 (10-27-19 @ 08:09) (62 - 79)  BP: 139/76 (10-27-19 @ 08:09) (113/68 - 145/72)  RR: 19 (10-27-19 @ 08:09) (17 - 20)  SpO2: 88% (10-27-19 @ 08:09) (88% - 96%)  Wt(kg): --  I&O's Summary    26 Oct 2019 07:01  -  27 Oct 2019 07:00  --------------------------------------------------------  IN: 0 mL / OUT: 3100 mL / NET: -3100 mL        LABS:                        9.1    7.40  )-----------( 351      ( 27 Oct 2019 07:19 )             30.0     10-27    144  |  107  |  16  ----------------------------<  158<H>  3.5   |  29  |  0.93    Ca    8.7      27 Oct 2019 07:19    TPro  6.8  /  Alb  2.6<L>  /  TBili  0.3  /  DBili  x   /  AST  30  /  ALT  40  /  AlkPhos  105  10-25    PT/INR - ( 25 Oct 2019 19:18 )   PT: 19.5 sec;   INR: 1.70 ratio         PTT - ( 25 Oct 2019 19:18 )  PTT:37.2 sec    CAPILLARY BLOOD GLUCOSE      POCT Blood Glucose.: 161 mg/dL (27 Oct 2019 07:46)  POCT Blood Glucose.: 250 mg/dL (26 Oct 2019 21:44)  POCT Blood Glucose.: 207 mg/dL (26 Oct 2019 17:14)  POCT Blood Glucose.: 164 mg/dL (26 Oct 2019 11:54)            MEDICATIONS  (STANDING):  apixaban 5 milliGRAM(s) Oral every 12 hours  atorvastatin 80 milliGRAM(s) Oral at bedtime  clopidogrel Tablet 75 milliGRAM(s) Oral daily  dextrose 5%. 1000 milliLiter(s) (50 mL/Hr) IV Continuous <Continuous>  dextrose 50% Injectable 12.5 Gram(s) IV Push once  dextrose 50% Injectable 25 Gram(s) IV Push once  dextrose 50% Injectable 25 Gram(s) IV Push once  insulin glargine Injectable (LANTUS) 6 Unit(s) SubCutaneous at bedtime  insulin lispro (HumaLOG) corrective regimen sliding scale   SubCutaneous three times a day before meals  insulin lispro (HumaLOG) corrective regimen sliding scale   SubCutaneous at bedtime  insulin lispro Injectable (HumaLOG) 5 Unit(s) SubCutaneous three times a day before meals  isosorbide   mononitrate ER Tablet (IMDUR) 30 milliGRAM(s) Oral daily  pantoprazole    Tablet 40 milliGRAM(s) Oral before breakfast  sucralfate 1 Gram(s) Oral four times a day  tamsulosin 0.4 milliGRAM(s) Oral at bedtime    MEDICATIONS  (PRN):  dextrose 40% Gel 15 Gram(s) Oral once PRN Blood Glucose LESS THAN 70 milliGRAM(s)/deciliter  glucagon  Injectable 1 milliGRAM(s) IntraMuscular once PRN Glucose LESS THAN 70 milligrams/deciliter      REVIEW OF SYSTEMS:  CONSTITUTIONAL: No fever, weight loss, or fatigue  EYES: No eye pain, visual disturbances, or discharge  ENMT:  No difficulty hearing, tinnitus, vertigo; No sinus or throat pain  NECK: No pain or stiffness  RESPIRATORY: No cough, wheezing, chills or hemoptysis; No shortness of breath  CARDIOVASCULAR: No chest pain, palpitations, dizziness, or leg swelling  GASTROINTESTINAL: No abdominal or epigastric pain. No nausea, vomiting, or hematemesis; No diarrhea or constipation. No melena or hematochezia.  GENITOURINARY: No dysuria, frequency, hematuria, or incontinence  NEUROLOGICAL: No headaches, memory loss, right upper and lower ext weakness  SKIN: No itching, burning, rashes, or lesions   LYMPH NODES: No enlarged glands  ENDOCRINE: No heat or cold intolerance; No hair loss  MUSCULOSKELETAL: No joint pain or swelling; No muscle, back, or extremity pain  PSYCHIATRIC: No depression, anxiety, mood swings, or difficulty sleeping  HEME/LYMPH: No easy bruising, or bleeding gums  ALLERY AND IMMUNOLOGIC: No hives or eczema    RADIOLOGY & ADDITIONAL TESTS:    Imaging Personally Reviewed:  [ ] YES  [ ] NO    Consultant(s) Notes Reviewed:  [ ] YES  [ ] NO    PHYSICAL EXAM:  GENERAL: NAD, well-groomed, well-developed  HEAD:  Atraumatic, Normocephalic  EYES: EOMI, PERRLA, conjunctiva and sclera clear  ENMT: No tonsillar erythema, exudates, or enlargement; Moist mucous membranes, Good dentition, No lesions  NECK: Supple, No JVD, Normal thyroid  NERVOUS SYSTEM:  Alert & Oriented X3, Good concentration; Motor Strength 4/5 right upper and lower ext, no pronator drift, no brisk return of DTR's, no tremor  CHEST/LUNG: Clear to percussion bilaterally; No rales, rhonchi, wheezing, or rubs  HEART: Regular rate and rhythm; No murmurs, rubs, or gallops  ABDOMEN: Soft, Nontender, Nondistended; Bowel sounds present  EXTREMITIES:  2+ Peripheral Pulses, No clubbing, cyanosis, or edema  LYMPH: No lymphadenopathy noted  SKIN: No rashes or lesions    Care Discussed with Consultants/Other Providers [ ] YES  [ ] NO

## 2019-10-28 ENCOUNTER — TRANSCRIPTION ENCOUNTER (OUTPATIENT)
Age: 49
End: 2019-10-28

## 2019-10-28 DIAGNOSIS — E05.90 THYROTOXICOSIS, UNSPECIFIED WITHOUT THYROTOXIC CRISIS OR STORM: ICD-10-CM

## 2019-10-28 LAB
ANION GAP SERPL CALC-SCNC: 6 MMOL/L — SIGNIFICANT CHANGE UP (ref 5–17)
BUN SERPL-MCNC: 15 MG/DL — SIGNIFICANT CHANGE UP (ref 7–23)
CALCIUM SERPL-MCNC: 8.5 MG/DL — SIGNIFICANT CHANGE UP (ref 8.5–10.1)
CHLORIDE SERPL-SCNC: 110 MMOL/L — HIGH (ref 96–108)
CO2 SERPL-SCNC: 28 MMOL/L — SIGNIFICANT CHANGE UP (ref 22–31)
CREAT SERPL-MCNC: 0.88 MG/DL — SIGNIFICANT CHANGE UP (ref 0.5–1.3)
GLUCOSE SERPL-MCNC: 161 MG/DL — HIGH (ref 70–99)
HCT VFR BLD CALC: 29.9 % — LOW (ref 39–50)
HGB BLD-MCNC: 9.2 G/DL — LOW (ref 13–17)
MCHC RBC-ENTMCNC: 25.3 PG — LOW (ref 27–34)
MCHC RBC-ENTMCNC: 30.8 GM/DL — LOW (ref 32–36)
MCV RBC AUTO: 82.1 FL — SIGNIFICANT CHANGE UP (ref 80–100)
NRBC # BLD: 0 /100 WBCS — SIGNIFICANT CHANGE UP (ref 0–0)
PLATELET # BLD AUTO: 364 K/UL — SIGNIFICANT CHANGE UP (ref 150–400)
POTASSIUM SERPL-MCNC: 3.5 MMOL/L — SIGNIFICANT CHANGE UP (ref 3.5–5.3)
POTASSIUM SERPL-SCNC: 3.5 MMOL/L — SIGNIFICANT CHANGE UP (ref 3.5–5.3)
RBC # BLD: 3.64 M/UL — LOW (ref 4.2–5.8)
RBC # FLD: 19.1 % — HIGH (ref 10.3–14.5)
SODIUM SERPL-SCNC: 144 MMOL/L — SIGNIFICANT CHANGE UP (ref 135–145)
THYROGLOB AB FLD IA-ACNC: <20 IU/ML — SIGNIFICANT CHANGE UP (ref 0–40)
THYROGLOB AB SERPL-ACNC: <20 IU/ML — SIGNIFICANT CHANGE UP (ref 0–40)
THYROPEROXIDASE AB SERPL-ACNC: 50.8 IU/ML — HIGH (ref 0–34.9)
WBC # BLD: 8.27 K/UL — SIGNIFICANT CHANGE UP (ref 3.8–10.5)
WBC # FLD AUTO: 8.27 K/UL — SIGNIFICANT CHANGE UP (ref 3.8–10.5)

## 2019-10-28 PROCEDURE — 99232 SBSQ HOSP IP/OBS MODERATE 35: CPT

## 2019-10-28 PROCEDURE — 76536 US EXAM OF HEAD AND NECK: CPT | Mod: 26

## 2019-10-28 RX ORDER — LOSARTAN POTASSIUM 100 MG/1
25 TABLET, FILM COATED ORAL DAILY
Refills: 0 | Status: DISCONTINUED | OUTPATIENT
Start: 2019-10-28 | End: 2019-10-31

## 2019-10-28 RX ADMIN — Medication 5 UNIT(S): at 08:39

## 2019-10-28 RX ADMIN — PANTOPRAZOLE SODIUM 40 MILLIGRAM(S): 20 TABLET, DELAYED RELEASE ORAL at 05:24

## 2019-10-28 RX ADMIN — Medication 1: at 08:39

## 2019-10-28 RX ADMIN — Medication 1 GRAM(S): at 05:24

## 2019-10-28 RX ADMIN — Medication 5 UNIT(S): at 17:06

## 2019-10-28 RX ADMIN — Medication 1 GRAM(S): at 12:24

## 2019-10-28 RX ADMIN — INSULIN GLARGINE 6 UNIT(S): 100 INJECTION, SOLUTION SUBCUTANEOUS at 21:54

## 2019-10-28 RX ADMIN — Medication 1 GRAM(S): at 23:23

## 2019-10-28 RX ADMIN — ATORVASTATIN CALCIUM 80 MILLIGRAM(S): 80 TABLET, FILM COATED ORAL at 21:54

## 2019-10-28 RX ADMIN — ISOSORBIDE MONONITRATE 30 MILLIGRAM(S): 60 TABLET, EXTENDED RELEASE ORAL at 12:23

## 2019-10-28 RX ADMIN — CLOPIDOGREL BISULFATE 75 MILLIGRAM(S): 75 TABLET, FILM COATED ORAL at 12:24

## 2019-10-28 RX ADMIN — APIXABAN 5 MILLIGRAM(S): 2.5 TABLET, FILM COATED ORAL at 05:24

## 2019-10-28 RX ADMIN — TAMSULOSIN HYDROCHLORIDE 0.4 MILLIGRAM(S): 0.4 CAPSULE ORAL at 21:54

## 2019-10-28 RX ADMIN — Medication 1: at 17:06

## 2019-10-28 RX ADMIN — Medication 5 UNIT(S): at 12:23

## 2019-10-28 RX ADMIN — APIXABAN 5 MILLIGRAM(S): 2.5 TABLET, FILM COATED ORAL at 17:06

## 2019-10-28 RX ADMIN — LOSARTAN POTASSIUM 25 MILLIGRAM(S): 100 TABLET, FILM COATED ORAL at 12:24

## 2019-10-28 RX ADMIN — Medication 1 GRAM(S): at 17:06

## 2019-10-28 NOTE — PROVIDER CONTACT NOTE (OTHER) - ACTION/TREATMENT ORDERED:
Notified and aware. Will follow protocol. And recheck blood sugar q15min until bs greater than 100.
Orders given for neuro checks Q4H

## 2019-10-28 NOTE — DISCHARGE NOTE PROVIDER - NSDCHC_MEDRECSTATUS_GEN_ALL_CORE
Admission Reconciliation is Completed  Discharge Reconciliation is Not Complete Admission Reconciliation is Completed  Discharge Reconciliation is Completed Patient

## 2019-10-28 NOTE — PROGRESS NOTE ADULT - SUBJECTIVE AND OBJECTIVE BOX
Neurology Follow up note    SARAH [id number]    HPI:  Patient is a 48 yo male with PMHx significant of CAD s/p BMS to RCA (8/2019), RUL subsegmental PE (6/2019, on Eliquis), hx of NSTEMI and PAF s/p ex-lap omentopexy and plication for perforated antral ulcer (5/2019), osteomyelitis s/p debridement, CVA s/p tPA (8/2019), HTN and DM2 (not on insulin) presenting to the ED with right sided weakness, facial droop, and slurred speech. Patient states that around 530pm today he suddenly experienced blurred vision, right sided weakness, and right sided facial drooping. He is currently in a rehab facility. There he notified his roommate who alerted the nurse, who then called for a transport to the emergency room. Patient was not a candidate for tPA due to his comorbidities. Patient endorses SOB at the time he was experiencing his symptoms of weakness and facial drooping. He is currently being treated for a UTI with ciprofloxacin(day 2) that was diagnosed earlier this week. Of note, in Aug of this year he presented with similar symptoms that was treated with tPA. Since then, the patient states that he was able to recover all weakness except for right lower extremity. He currently receives PT/OT 5x a week in the rehab facility. Patient denies chest pain, SOB, fever, chills, vomiting and diarrhea. Patient endorses nausea and mild abdominal pain that he says is resolving since his diagnoses of UTI. He has a chronic urinary catheter in place for his chronic urinary retention.         In the ED:   Vitals: /72 HR 79 RR 19 SpO2 94 % @RA  Labs: h/h 9.9/32.6, BMP WNL except for alb 2.6. CE x1 normal   EKG Sinus rhythm with premature atrial complexes   CTA head:   1. Widely patent head and neck vasculature. No intraluminal thrombus or   dissection noted. 2. Incidental right MCA bifurcation aneurysm. Interval reassessment and   follow-up is recommended.  CT head:  Mild chronic microvascular changes without evidence of an   acute transcortical infarction or hemorrhage. (25 Oct 2019 21:35)      Interval History - no new weakness    Patient is seen, chart was reviewed and case was discussed with the treatment team.  Pt is not in any distress.   Lying on bed comfortably.   No events reported overnight.   No clinical seizure was reported.      Vital Signs Last 24 Hrs  T(C): 36.4 (28 Oct 2019 12:27), Max: 37.1 (27 Oct 2019 23:43)  T(F): 97.5 (28 Oct 2019 12:27), Max: 98.7 (27 Oct 2019 23:43)  HR: 55 (28 Oct 2019 12:27) (55 - 78)  BP: 156/86 (28 Oct 2019 12:27) (103/65 - 156/86)  BP(mean): --  RR: 18 (28 Oct 2019 12:27) (17 - 18)  SpO2: 99% (28 Oct 2019 12:27) (92% - 99%)        REVIEW OF SYSTEMS:    Constitutional: No fever, weight loss or fatigue  Eyes: No eye pain, visual disturbances, or discharge  ENT:  No difficulty hearing, tinnitus, vertigo; No sinus or throat pain  Neck: No pain or stiffness  Respiratory: No cough, wheezing, chills or hemoptysis  Cardiovascular: No chest pain, palpitations, shortness of breath, dizziness or leg swelling  Gastrointestinal: No abdominal or epigastric pain. No nausea, vomiting or hematemesis;  Genitourinary: No dysuria, frequency, hematuria or incontinence  Neurological: No headaches, memory loss, loss of strength, numbness or tremors  Psychiatric: No depression, anxiety, mood swings or difficulty sleeping  Musculoskeletal: No joint pain or swelling; No muscle, back or extremity pain  Skin: No itching, burning, rashes or lesions   Lymph Nodes: No enlarged glands  Endocrine: No heat or cold intolerance; No hair loss,  Allergy and Immunologic: No hives or eczema    On Neurological Examination:    Mental Status - Pt is alert, awake, oriented X3. Follows commands well and able to answer questions appropriately  .Mood and affect  normal    Speech -  Normal.     Cranial Nerves - Pupils 3 mm equal and reactive to light, extraocular eye movements intact.   Pt has no  facial asymmetry. Facial sensation is intact.    Muscle tone - is normal all over. Moves all extremities equally.     Motor Exam - right hemiparesis; 3/5 ; give away type of weakness.    Sensory Exam -  Pt withdraws all extremities equally on stimulation. No asymmetry seen. No complaints of tingling, numbness.        coordination:    Finger to nose: normal      Deep tendon Reflexes - 2 plus all over.       Neck Supple -  Yes.     MEDICATIONS    apixaban 5 milliGRAM(s) Oral every 12 hours  atorvastatin 80 milliGRAM(s) Oral at bedtime  clopidogrel Tablet 75 milliGRAM(s) Oral daily  dextrose 40% Gel 15 Gram(s) Oral once PRN  dextrose 5%. 1000 milliLiter(s) IV Continuous <Continuous>  dextrose 50% Injectable 12.5 Gram(s) IV Push once  dextrose 50% Injectable 25 Gram(s) IV Push once  dextrose 50% Injectable 25 Gram(s) IV Push once  glucagon  Injectable 1 milliGRAM(s) IntraMuscular once PRN  insulin glargine Injectable (LANTUS) 6 Unit(s) SubCutaneous at bedtime  insulin lispro (HumaLOG) corrective regimen sliding scale   SubCutaneous three times a day before meals  insulin lispro (HumaLOG) corrective regimen sliding scale   SubCutaneous at bedtime  insulin lispro Injectable (HumaLOG) 5 Unit(s) SubCutaneous three times a day before meals  isosorbide   mononitrate ER Tablet (IMDUR) 30 milliGRAM(s) Oral daily  pantoprazole    Tablet 40 milliGRAM(s) Oral before breakfast  sucralfate 1 Gram(s) Oral four times a day  tamsulosin 0.4 milliGRAM(s) Oral at bedtime      Allergies    fish (Hives)  No Known Drug Allergies    Intolerances                            9.2    8.27  )-----------( 364      ( 28 Oct 2019 08:17 )             29.9         10-27    144  |  107  |  16  ----------------------------<  158<H>  3.5   |  29  |  0.93    Ca    8.7      27 Oct 2019 07:19    TPro  6.8  /  Alb  2.6<L>  /  TBili  0.3  /  DBili  x   /  AST  30  /  ALT  40  /  AlkPhos  105  10-25    Hemoglobin A1C:   Lipid Panel 10-26 @ 13:26  Total Cholesterol, Serum 77  LDL 32  Triglycerides 65    Vitamin B12   < from: EEG Awake or Drowsy (10.28.19 @ 14:30) >     EXAM:  EEG-AWAKE AND DROWSY        PROCEDURE DATE:  10/28/2019        INTERPRETATION:  GENERAL DESCRIPTION: The EEG was recorded with a   32-channel digital EEG machine. The standard international 10/20   electrode placements were used.    CONDITIONS OF RECORDING: The EEG was carried out with the patient in the   awake and drowsy states. Muscle and movement artifacts are present during   the waking record    DESCRIPTION: The resting waking  background rhythms consist of moderate   voltage, symmetrical activity which is fairly well organized. The   frequency spectrum showed a moderate amount of theta and beta activity   and a negligible amount of delta activity. The posterior dominant rhythm   of 8 Hz, that is reactive to eye opening and closing was seen.    The patient drowses with slowing to irregular low voltage theta and beta   activity.    Hyperventilation was not performed for medical reasons. Intermittent   photic stimulation from 2-20 flashes per second fails to elicit any   abnormality.    There were no areas of focal slowing, epileptiform discharges or   electrographic seizures.      IMPRESSION: Normal EEG    COMMENT: A 24-48 hour EEG might be desirable to further evaluate for   possible seizure disorder, if clinically necessary. This EEG does not   exclude the clinical diagnosis of seizures or epilepsy.      CARIDAD RIOS M.D., Attending Neurologist  This document has been electronically signed. Oct 28 2019 12:58PM              RADIOLOGY    ASSESSMENT AND PLAN:      presented with right sided weakness with slurred speech;  brain mri no acute cva ;TIA vs CONVERSION DISORDER; DOUBT SEIZURE  HYPERTHYROID STATE.    EEG REPORTED NO SEIZURE ACTIVITY.  CONTINUE AC  Physical therapy evaluation.  OOB to chair/ambulation with assistance only.  Advanced care planning was discussed with family.  Pain is accessed and addressed..  Plan of care was discussed with family. Questions answered.  Would continue to follow.

## 2019-10-28 NOTE — PROGRESS NOTE ADULT - SUBJECTIVE AND OBJECTIVE BOX
Patient is a 49y old  Male who presents with a chief complaint of Right sided weakness and facial droop (28 Oct 2019 08:26)      INTERVAL HPI/OVERNIGHT EVENTS: Patient examined at bedside. Patient admits ******. Patient denies N/V/D, chest pain, SOB, abdominal pain. No other complaints at this time. No acute overnight events appreciated.    T(C): 36.9 (10-28-19 @ 08:27), Max: 37.1 (10-27-19 @ 23:43)  HR: 57 (10-28-19 @ 08:27) (57 - 78)  BP: 147/78 (10-28-19 @ 08:27) (103/65 - 147/78)  RR: 18 (10-28-19 @ 08:27) (16 - 18)  SpO2: 94% (10-28-19 @ 08:27) (92% - 97%)  Wt(kg): --  I&O's Summary    27 Oct 2019 07:01  -  28 Oct 2019 07:00  --------------------------------------------------------  IN: 570 mL / OUT: 3700 mL / NET: -3130 mL        LABS:                        9.2    8.27  )-----------( 364      ( 28 Oct 2019 08:17 )             29.9     10-28    144  |  110<H>  |  15  ----------------------------<  161<H>  3.5   |  28  |  0.88    Ca    8.5      28 Oct 2019 08:17          CAPILLARY BLOOD GLUCOSE      POCT Blood Glucose.: 164 mg/dL (28 Oct 2019 08:16)  POCT Blood Glucose.: 286 mg/dL (27 Oct 2019 21:46)  POCT Blood Glucose.: 203 mg/dL (27 Oct 2019 16:46)  POCT Blood Glucose.: 196 mg/dL (27 Oct 2019 11:44)            MEDICATIONS  (STANDING):  apixaban 5 milliGRAM(s) Oral every 12 hours  atorvastatin 80 milliGRAM(s) Oral at bedtime  clopidogrel Tablet 75 milliGRAM(s) Oral daily  dextrose 5%. 1000 milliLiter(s) (50 mL/Hr) IV Continuous <Continuous>  dextrose 50% Injectable 12.5 Gram(s) IV Push once  dextrose 50% Injectable 25 Gram(s) IV Push once  dextrose 50% Injectable 25 Gram(s) IV Push once  insulin glargine Injectable (LANTUS) 6 Unit(s) SubCutaneous at bedtime  insulin lispro (HumaLOG) corrective regimen sliding scale   SubCutaneous three times a day before meals  insulin lispro (HumaLOG) corrective regimen sliding scale   SubCutaneous at bedtime  insulin lispro Injectable (HumaLOG) 5 Unit(s) SubCutaneous three times a day before meals  isosorbide   mononitrate ER Tablet (IMDUR) 30 milliGRAM(s) Oral daily  pantoprazole    Tablet 40 milliGRAM(s) Oral before breakfast  sucralfate 1 Gram(s) Oral four times a day  tamsulosin 0.4 milliGRAM(s) Oral at bedtime    MEDICATIONS  (PRN):  dextrose 40% Gel 15 Gram(s) Oral once PRN Blood Glucose LESS THAN 70 milliGRAM(s)/deciliter  glucagon  Injectable 1 milliGRAM(s) IntraMuscular once PRN Glucose LESS THAN 70 milligrams/deciliter      REVIEW OF SYSTEMS:  CONSTITUTIONAL: No fever, weight loss, or fatigue  EYES: No eye pain, visual disturbances, or discharge  ENMT:  No difficulty hearing, tinnitus, vertigo; No sinus or throat pain  NECK: No pain or stiffness  RESPIRATORY: No cough, wheezing, chills or hemoptysis; No shortness of breath  CARDIOVASCULAR: No chest pain, palpitations, dizziness, or leg swelling  GASTROINTESTINAL: No abdominal or epigastric pain. No nausea, vomiting, or hematemesis; No diarrhea or constipation. No melena or hematochezia.  GENITOURINARY: No dysuria, frequency, hematuria, or incontinence  NEUROLOGICAL: No headaches, memory loss, right upper and lower ext weakness  SKIN: No itching, burning, rashes, or lesions   LYMPH NODES: No enlarged glands  ENDOCRINE: No heat or cold intolerance; No hair loss  MUSCULOSKELETAL: No joint pain or swelling; No muscle, back, or extremity pain  PSYCHIATRIC: No depression, anxiety, mood swings, or difficulty sleeping  HEME/LYMPH: No easy bruising, or bleeding gums  ALLERY AND IMMUNOLOGIC: No hives or eczema      RADIOLOGY & ADDITIONAL TESTS: No acute imaging in the last 24 hours    Imaging Personally Reviewed:  [ X ] YES  [ ] NO    Consultant(s) Notes Reviewed:  [ X ] YES  [ ] NO    SICAL EXAM:  GENERAL: NAD, well-groomed, well-developed  HEAD:  Atraumatic, Normocephalic  EYES: EOMI, PERRLA, conjunctiva and sclera clear  ENMT: No tonsillar erythema, exudates, or enlargement; Moist mucous membranes, Good dentition, No lesions  NECK: Supple, No JVD, Normal thyroid  NERVOUS SYSTEM:  Alert & Oriented X3, Good concentration; Motor Strength 4/5 right upper and lower ext, no pronator drift, no brisk return of DTR's, no tremor  CHEST/LUNG: Clear to percussion bilaterally; No rales, rhonchi, wheezing, or rubs  HEART: Regular rate and rhythm; No murmurs, rubs, or gallops  ABDOMEN: Soft, Nontender, Nondistended; Bowel sounds present  EXTREMITIES:  2+ Peripheral Pulses, No clubbing, cyanosis, or edema  LYMPH: No lymphadenopathy noted  SKIN: No rashes or lesions    Care Discussed with Consultants/Other Providers [ X ] YES  [ ] NO Patient is a 49y old  Male who presents with a chief complaint of Right sided weakness and facial droop (28 Oct 2019 08:26)      INTERVAL HPI/OVERNIGHT EVENTS: Patient examined at bedside. Patient admits to resolution of slurring of speech and facial droop. Admits to remaining R LE weakness, but has been improving.  Patient denies N/V/D, chest pain, SOB, abdominal pain. No other complaints at this time. No acute overnight events appreciated. Pt indicates he wishes to return to rehab, which is where he came from on admission to the hospital.     T(C): 36.9 (10-28-19 @ 08:27), Max: 37.1 (10-27-19 @ 23:43)  HR: 57 (10-28-19 @ 08:27) (57 - 78)  BP: 147/78 (10-28-19 @ 08:27) (103/65 - 147/78)  RR: 18 (10-28-19 @ 08:27) (16 - 18)  SpO2: 94% (10-28-19 @ 08:27) (92% - 97%)  Wt(kg): --  I&O's Summary    27 Oct 2019 07:01  -  28 Oct 2019 07:00  --------------------------------------------------------  IN: 570 mL / OUT: 3700 mL / NET: -3130 mL        LABS:                        9.2    8.27  )-----------( 364      ( 28 Oct 2019 08:17 )             29.9     10-28    144  |  110<H>  |  15  ----------------------------<  161<H>  3.5   |  28  |  0.88    Ca    8.5      28 Oct 2019 08:17          CAPILLARY BLOOD GLUCOSE      POCT Blood Glucose.: 164 mg/dL (28 Oct 2019 08:16)  POCT Blood Glucose.: 286 mg/dL (27 Oct 2019 21:46)  POCT Blood Glucose.: 203 mg/dL (27 Oct 2019 16:46)  POCT Blood Glucose.: 196 mg/dL (27 Oct 2019 11:44)            MEDICATIONS  (STANDING):  apixaban 5 milliGRAM(s) Oral every 12 hours  atorvastatin 80 milliGRAM(s) Oral at bedtime  clopidogrel Tablet 75 milliGRAM(s) Oral daily  dextrose 5%. 1000 milliLiter(s) (50 mL/Hr) IV Continuous <Continuous>  dextrose 50% Injectable 12.5 Gram(s) IV Push once  dextrose 50% Injectable 25 Gram(s) IV Push once  dextrose 50% Injectable 25 Gram(s) IV Push once  insulin glargine Injectable (LANTUS) 6 Unit(s) SubCutaneous at bedtime  insulin lispro (HumaLOG) corrective regimen sliding scale   SubCutaneous three times a day before meals  insulin lispro (HumaLOG) corrective regimen sliding scale   SubCutaneous at bedtime  insulin lispro Injectable (HumaLOG) 5 Unit(s) SubCutaneous three times a day before meals  isosorbide   mononitrate ER Tablet (IMDUR) 30 milliGRAM(s) Oral daily  pantoprazole    Tablet 40 milliGRAM(s) Oral before breakfast  sucralfate 1 Gram(s) Oral four times a day  tamsulosin 0.4 milliGRAM(s) Oral at bedtime    MEDICATIONS  (PRN):  dextrose 40% Gel 15 Gram(s) Oral once PRN Blood Glucose LESS THAN 70 milliGRAM(s)/deciliter  glucagon  Injectable 1 milliGRAM(s) IntraMuscular once PRN Glucose LESS THAN 70 milligrams/deciliter      REVIEW OF SYSTEMS:  CONSTITUTIONAL: No fever, weight loss, or fatigue  EYES: No eye pain, visual disturbances, or discharge  ENMT:  No difficulty hearing, tinnitus, No sinus or throat pain  NECK: No pain or stiffness  RESPIRATORY: No cough, wheezing, chills or hemoptysis; No shortness of breath  CARDIOVASCULAR: No chest pain, palpitations, dizziness, or leg swelling  GASTROINTESTINAL: No abdominal or epigastric pain. No nausea, vomiting, or hematemesis; No diarrhea or constipation. No melena or hematochezia.  NEUROLOGICAL: No headaches, memory loss. Admits to R LE weakness.   MUSCULOSKELETAL: No joint pain or swelling; No muscle, back, or extremity pain  PSYCHIATRIC: No depression, anxiety        RADIOLOGY & ADDITIONAL TESTS: No acute imaging in the last 24 hours    Imaging Personally Reviewed:  [ X ] YES  [ ] NO    Consultant(s) Notes Reviewed:  [ X ] YES  [ ] NO    Physical EXAM:  GENERAL: NAD, well-groomed, well-developed  HEAD:  Atraumatic, Normocephalic  EYES: PERRLA, conjunctiva and sclera clear  ENMT: No tonsillar erythema, exudates, or enlargement; Moist mucous membranes, Good dentition, No lesions  NECK: Supple, soft.   NERVOUS SYSTEM:  Alert & Oriented X3, Good concentration; Motor Strength 4/5 b/l upper ext and L LE. R LE 3/5 strength. + strength. sensation ext and face intact, no tremor. No facial droop.   CHEST/LUNG: Clear to percussion bilaterally; No rales, rhonchi, wheezing, or rubs  HEART: Regular rate and rhythm; No murmurs, rubs, or gallops  ABDOMEN: Soft, Nontender, Nondistended; Bowel sounds present  EXTREMITIES:  2+ Peripheral Pulses, No clubbing, cyanosis, or edema      Care Discussed with Consultants/Other Providers [ X ] YES  [ ] NO

## 2019-10-28 NOTE — PROGRESS NOTE ADULT - SUBJECTIVE AND OBJECTIVE BOX
[facility] -- Bolivar Carbajal, Brittany Gallegos, Reynaldo Ku Savella, [place]  Office # [phone]    Follow Up:  Neuro symptoms, TIA?    Subjective/Observations: Awake and alert, comfortable on RA.  No further neuro symptoms.  No c/o any respiratory or cardiac discomfort    REVIEW OF SYSTEMS: All other review of systems is negative unless indicated above  PAST MEDICAL & SURGICAL HISTORY:  Cerebrovascular accident  CAD S/P percutaneous coronary angioplasty  Osteomyelitis: s/p debridement  History of non-ST elevation myocardial infarction (NSTEMI)  Pulmonary embolism  Perforated gastric ulcer: s/p emergent ex-lap omentopexy and plication 6/2019  BPH (benign prostatic hyperplasia)  Hypertension  Afib  Diabetes mellitus with no complication  Diabetes  Traumatic amputation of left foot, initial encounter  Perforated gastric ulcer  H/O abdominal surgery    MEDICATIONS  (STANDING):  apixaban 5 milliGRAM(s) Oral every 12 hours  atorvastatin 80 milliGRAM(s) Oral at bedtime  clopidogrel Tablet 75 milliGRAM(s) Oral daily  dextrose 5%. 1000 milliLiter(s) (50 mL/Hr) IV Continuous <Continuous>  dextrose 50% Injectable 12.5 Gram(s) IV Push once  dextrose 50% Injectable 25 Gram(s) IV Push once  dextrose 50% Injectable 25 Gram(s) IV Push once  insulin glargine Injectable (LANTUS) 6 Unit(s) SubCutaneous at bedtime  insulin lispro (HumaLOG) corrective regimen sliding scale   SubCutaneous three times a day before meals  insulin lispro (HumaLOG) corrective regimen sliding scale   SubCutaneous at bedtime  insulin lispro Injectable (HumaLOG) 5 Unit(s) SubCutaneous three times a day before meals  isosorbide   mononitrate ER Tablet (IMDUR) 30 milliGRAM(s) Oral daily  pantoprazole    Tablet 40 milliGRAM(s) Oral before breakfast  sucralfate 1 Gram(s) Oral four times a day  tamsulosin 0.4 milliGRAM(s) Oral at bedtime    MEDICATIONS  (PRN):  dextrose 40% Gel 15 Gram(s) Oral once PRN Blood Glucose LESS THAN 70 milliGRAM(s)/deciliter  glucagon  Injectable 1 milliGRAM(s) IntraMuscular once PRN Glucose LESS THAN 70 milligrams/deciliter    Allergies    fish (Hives)  No Known Drug Allergies    Intolerances    Vital Signs Last 24 Hrs  T(C): 36.8 (28 Oct 2019 04:47), Max: 37.1 (27 Oct 2019 23:43)  T(F): 98.2 (28 Oct 2019 04:47), Max: 98.7 (27 Oct 2019 23:43)  HR: 64 (28 Oct 2019 04:47) (60 - 78)  BP: 115/62 (28 Oct 2019 04:47) (103/65 - 139/84)  BP(mean): --  RR: 18 (28 Oct 2019 04:47) (16 - 18)  SpO2: 92% (28 Oct 2019 04:47) (92% - 97%)  I&O's Summary    27 Oct 2019 07:01  -  28 Oct 2019 07:00  --------------------------------------------------------  IN: 570 mL / OUT: 3700 mL / NET: -3130 mL    PHYSICAL EXAM:  TELE: Vpaced  Constitutional: NAD, awake and alert, well-developed  HEENT: Moist Mucous Membranes, Anicteric  Pulmonary: Non-labored, breath sounds are diminished at bases bilaterally, No wheezing, rales or rhonchi  Cardiovascular: Regular, S1 and S2, +murmurs.  No rubs, gallops or clicks  Gastrointestinal: Bowel Sounds present, soft, nontender.   Lymph: No peripheral edema. No lymphadenopathy.  Skin: No visible rashes or ulcers.  Psych:  Mood & affect appropriate  LABS: All Labs Reviewed:                        9.2    8.27  )-----------( 364      ( 28 Oct 2019 08:17 )             29.9                         9.1    7.40  )-----------( 351      ( 27 Oct 2019 07:19 )             30.0                         9.2    7.18  )-----------( 287      ( 26 Oct 2019 07:24 )             31.0     27 Oct 2019 07:19    144    |  107    |  16     ----------------------------<  158    3.5     |  29     |  0.93   26 Oct 2019 07:24    142    |  106    |  18     ----------------------------<  112    3.3     |  28     |  0.94   25 Oct 2019 19:18    143    |  107    |  21     ----------------------------<  90     3.8     |  26     |  1.10     Ca    8.7        27 Oct 2019 07:19  Ca    8.3        26 Oct 2019 07:24  Ca    8.5        25 Oct 2019 19:18    TPro  6.8    /  Alb  2.6    /  TBili  0.3    /  DBili  x      /  AST  30     /  ALT  40     /  AlkPhos  105    25 Oct 2019 19:18    < from: TTE Echo Doppler w/o Cont (08.15.19 @ 16:33) >     EXAM:  ECHO TTE WO CON COMP W DOPPLR       PROCEDURE DATE:  08/15/2019      INTERPRETATION:  INDICATION: Atrial fibrillation    Blood Pressure 128/64    Height 187.9 cm     Weight 91 kg       BSA 2.18   sq m    Dimensions:    LA 3.7       Normal Values: 2.0 - 4.0 cm    Ao 3.6        Normal Values: 2.0 - 3.8 cm  SEPTUM 1.6       Normal Values: 0.6 - 1.2 cm  PWT 1.4       Normal Values: 0.6 - 1.1 cm  LVIDd 5.3         Normal Values: 3.0 - 5.6 cm  LVIDs 2.7         Normal Values: 1.8 - 4.0 cm    OBSERVATIONS:  Technically difficult study  Mitral Valve: normal, mild to moderate MR.  Aortic Valve/Aorta: Aortic valve is not well-visualized, likely normal   function.  Tricuspid Valve: normal with trace TR.  Pulmonic Valve: normal  Left Atrium: Enlarged  Right Atrium: normal  Left Ventricle: normal LV size and systolic function, estimated LVEF of   65%. Left ventricular hypertrophy  Right Ventricle: Grossly normal size and systolic function.  Pericardium/Pleura: normal, no significant pericardial effusion.  LV Diastolic Function: normal    Conclusion:   Technically difficult study  Normal left ventricular internal dimensions and systolic function,   estimated LVEF of 65%.   Grossly normal RV size and systolic function.   Left atrial enlargement  Aortic valve is not well-visualized, likely normal function.   Mild to moderate MR   Trace TR.    No significant pericardial effusion.      MIROSLAVA CASTAÑEDA   This document has been electronically signed. Aug 16 2019  8:25AM     < end of copied text >    < from: Xray Chest 1 View AP/PA (10.25.19 @ 21:02) >    EXAM:  XR CHEST AP OR PA 1V                          PROCEDURE DATE:  10/25/2019      INTERPRETATION:  History: Stroke    Portable chest x-ray is performed. Comparison is made to 10/14/2019.    The heart is not enlarged. The trachea is midline. Lungs are clear. The   osseous structures demonstrate degenerative changes.    Impression: No active disease. No change    DONALDO GARCIA M.D.,ATTENDING RADIOLOGIST  This document has been electronically signed. Oct 26 2019 10:03AM     < end of copied text >     < from: 12 Lead ECG (10.25.19 @ 19:55) >    Ventricular Rate 71 BPM    Atrial Rate 71 BPM    P-R Interval 140 ms    QRS Duration 84 ms    Q-T Interval 394 ms    QTC Calculation(Bezet) 428 ms    P Axis 71 degrees    R Axis 27 degrees    T Axis 103 degrees    Diagnosis Line Sinus rhythm with premature atrial complexes  ST & T wave abnormality, consider lateral ischemia  Confirmed by JOHN KU ([id number]) on 10/26/2019 11:06:02 AM    < end of copied text >

## 2019-10-28 NOTE — PROGRESS NOTE ADULT - PROBLEM SELECTOR PLAN 10
low tsh as noted- rule out amio induced hyperthyroidism  await follow up labs  -As per Dr. Oh, Endo: likely due to amiodarone, started in may 2019  tfts c/w subclinical hyperthyroidism  check thyroid US  ?type 1 functional hyperthyroidism vs. type 2 thyroiditis  may require both methimazole and prednisone as unable to elucidate etiology in this setting.

## 2019-10-28 NOTE — CONSULT NOTE ADULT - PROBLEM SELECTOR RECOMMENDATION 9
cont humalog 5 units 3x/day before meals  cont low dose humalog scale coverage qac/qhs  cont lantus 6 units qhs

## 2019-10-28 NOTE — PROGRESS NOTE ADULT - PROBLEM SELECTOR PLAN 1
- Patient admitted for CVA. Right sided facial drooping (improved while in ER), Right sided weakness in upper and lower extremity.  - Patient had previous CVA on 8/2019 s/p TPA in the ED (around 22:00)  - Patient did not receive TPA this admission, currently on Eliquis and clopidogrel  - CT head shows no intraluminal thrombus or dissection. Incidental right MCA bifurcation aneurysm. Mild chronic microvascular changes without evidence of an   acute transcortical infarction or hemorrhage.   - MRI neg for acute cva  - cont neuro checks q4hr for now  - lipid panel result noted  - HbA1C 7.4, improved from 8.3 on 8/1/2019  - c/w atorvastatin 80mg   -Clear liquid diet  - cont PT eval/ OT eval  - Dr. Jon, neuro, consulted in ED, rec appreciated - Patient admitted for CVA. Right sided facial drooping (improved while in ER), Right sided weakness in upper and lower extremity.  - Patient had previous CVA on 8/2019 s/p TPA in the ED (around 22:00)  - Patient did not receive TPA this admission, currently on Eliquis and clopidogrel  - CT head shows no intraluminal thrombus or dissection. Incidental right MCA bifurcation aneurysm. Mild chronic microvascular changes without evidence of an   acute transcortical infarction or hemorrhage.   - MRI neg for acute cva  - cont neuro checks q4hr for now  - lipid panel result noted  - HbA1C 7.4, improved from 8.3 on 8/1/2019  - c/w atorvastatin 80mg   - cont PT eval/ OT eval  - Dr. Jon, neuro, consulted in ED, rec appreciated

## 2019-10-28 NOTE — PROGRESS NOTE ADULT - PROBLEM SELECTOR PLAN 9
- DVT prophylaxis, continue Eliquis 5mg bid and clopidogrel 75mg  IMPROVE VTE Individual Risk Assessment        RISK                                                          Points  [  ] Previous VTE                                                3  [  ] Thrombophilia                                             2  [  ] Lower limb paralysis                                   2        (unable to hold up >15 seconds)    [  ] Current Cancer                                             2         (within 6 months)  [  ] Immobilization > 24 hrs                              1  [  ] ICU/CCU stay > 24 hours                             1  [  ] Age > 60                                                         1    IMPROVE VTE Score:         [         ]  Total Risk Factor Score:    0 - 1:   Consider IPC  >2 - 3:  Thromboprophylaxis required (enoxaparin or SQ heparin)        >4:   High Risk: Thromboprophylaxis required (enoxaparin or SQ heparin), optional add IPC  **If CONTRAINDICATION to enoxaparin or SQ heparin, USE IPCs** - DVT prophylaxis, continue Eliquis 5mg bid and clopidogrel 75mg  - When medically optimized, pt to be d/c to rehab.   IMPROVE VTE Individual Risk Assessment        RISK                                                          Points  [  ] Previous VTE                                                3  [  ] Thrombophilia                                             2  [  ] Lower limb paralysis                                   2        (unable to hold up >15 seconds)    [  ] Current Cancer                                             2         (within 6 months)  [  ] Immobilization > 24 hrs                              1  [  ] ICU/CCU stay > 24 hours                             1  [  ] Age > 60                                                         1    IMPROVE VTE Score:         [         ]  Total Risk Factor Score:    0 - 1:   Consider IPC  >2 - 3:  Thromboprophylaxis required (enoxaparin or SQ heparin)        >4:   High Risk: Thromboprophylaxis required (enoxaparin or SQ heparin), optional add IPC  **If CONTRAINDICATION to enoxaparin or SQ heparin, USE IPCs**

## 2019-10-28 NOTE — DISCHARGE NOTE PROVIDER - PROVIDER TOKENS
PROVIDER:[TOKEN:[[id number]]],FREE:[LAST:[Rico],FIRST:[Linette],PHONE:[(   )    -],FAX:[(   )    -]],PROVIDER:[TOKEN:[[id number]]] PROVIDER:[TOKEN:[[id number]]],PROVIDER:[TOKEN:[[id number]]],FREE:[LAST:[Jacky],FIRST:[Linette],PHONE:[(   )    -],FAX:[(   )    -]],PROVIDER:[TOKEN:[[id number]]] PROVIDER:[TOKEN:[[id number]],FOLLOWUP:[1 week]],PROVIDER:[TOKEN:[[id number]],FOLLOWUP:[1 week]],PROVIDER:[TOKEN:[[id number]],FOLLOWUP:[1 week]],FREE:[LAST:[Rico],FIRST:[Linette],PHONE:[(   )    -],FAX:[(   )    -],FOLLOWUP:[1 week]]

## 2019-10-28 NOTE — DISCHARGE NOTE PROVIDER - HOSPITAL COURSE
FROM ADMISSION H+P:     HPI:    Patient is a 50 yo male with PMHx significant of CAD s/p BMS to RCA (8/2019), RUL subsegmental PE (6/2019, on Eliquis), hx of NSTEMI and PAF s/p ex-lap omentopexy and plication for perforated antral ulcer (5/2019), osteomyelitis s/p debridement, CVA s/p tPA (8/2019), HTN and DM2 (not on insulin) presenting to the ED with right sided weakness, facial droop, and slurred speech. Patient states that around 530pm today he suddenly experienced blurred vision, right sided weakness, and right sided facial drooping. He is currently in a rehab facility. There he notified his roommate who alerted the nurse, who then called for a transport to the emergency room. Patient was not a candidate for tPA due to his comorbidities. Patient endorses SOB at the time he was experiencing his symptoms of weakness and facial drooping. He is currently being treated for a UTI with ciprofloxacin(day 2) that was diagnosed earlier this week. Of note, in Aug of this year he presented with similar symptoms that was treated with tPA. Since then, the patient states that he was able to recover all weakness except for right lower extremity. He currently receives PT/OT 5x a week in the rehab facility. Patient denies chest pain, SOB, fever, chills, vomiting and diarrhea. Patient endorses nausea and mild abdominal pain that he says is resolving since his diagnoses of UTI. He has a chronic urinary catheter in place for his chronic urinary retention.             In the ED:     Vitals: /72 HR 79 RR 19 SpO2 94 % @RA    Labs: h/h 9.9/32.6, BMP WNL except for alb 2.6. CE x1 normal     EKG Sinus rhythm with premature atrial complexes     CTA head:     1. Widely patent head and neck vasculature. No intraluminal thrombus or     dissection noted. 2. Incidental right MCA bifurcation aneurysm. Interval reassessment and     follow-up is recommended.    CT head:  Mild chronic microvascular changes without evidence of an     acute transcortical infarction or hemorrhage. (25 Oct 2019 21:35)            ---    HOSPITAL COURSE: Pt was seen and examined in ED. Patient admitted for r/o CVA. Right sided facial drooping (improved while in ER), Right sided weakness in upper and lower extremity. Patient had previous CVA on 8/2019 s/p TPA in the ED (around 22:00). Patient did not receive TPA this admission, currently on Eliquis and clopidogrel. CT head shows no intraluminal thrombus or dissection. Incidental right MCA bifurcation aneurysm. Mild chronic microvascular changes without evidence of an acute transcortical infarction or hemorrhage.- Continue to medications of cefuroxime 500 twice a day for current UTI. Cardiology was consulted and recommended d/c Amiodarone in setting of possible medication induced hyperthyroidism. Home dose Losartan was held and BB was started. Endocrinology was consulted. Thyroid studies were done and US thyroid was completed revealing no thyroid nodules. MRI brain confirmed no evidence of acute CVA. Pt was seen and evaluated on day of discharge. Pt is medically optimized for discharge with close follow up outpatient with PMD, Cardiologist, Neurologist and Endocrinologist.         ---    CONSULTANTS:     Cardio: Dr. Sweeney    Neuro: Dr. Jon     Endo: Dr. Perlman        ---    FINAL DISCHARGE DIAGNOSIS LIST:    Please see last daily progress note for final discharge diagnoses FROM ADMISSION H+P:     HPI:    Patient is a 50 yo male with PMHx significant of CAD s/p BMS to RCA (8/2019), RUL subsegmental PE (6/2019, on Eliquis), hx of NSTEMI and PAF s/p ex-lap omentopexy and plication for perforated antral ulcer (5/2019), osteomyelitis s/p debridement, CVA s/p tPA (8/2019), HTN and DM2 (not on insulin) presenting to the ED with right sided weakness, facial droop, and slurred speech. Patient states that around 530pm today he suddenly experienced blurred vision, right sided weakness, and right sided facial drooping. He is currently in a rehab facility. There he notified his roommate who alerted the nurse, who then called for a transport to the emergency room. Patient was not a candidate for tPA due to his comorbidities. Patient endorses SOB at the time he was experiencing his symptoms of weakness and facial drooping. He is currently being treated for a UTI with ciprofloxacin(day 2) that was diagnosed earlier this week. Of note, in Aug of this year he presented with similar symptoms that was treated with tPA. Since then, the patient states that he was able to recover all weakness except for right lower extremity. He currently receives PT/OT 5x a week in the rehab facility. Patient denies chest pain, SOB, fever, chills, vomiting and diarrhea. Patient endorses nausea and mild abdominal pain that he says is resolving since his diagnoses of UTI. He has a chronic urinary catheter in place for his chronic urinary retention.                 ---    HOSPITAL COURSE: Pt was seen and examined in ED. Patient admitted for r/o CVA. Right sided facial drooping (improved while in ER), Right sided weakness in upper and lower extremity. Patient had previous CVA on 8/2019 s/p TPA in the ED (around 22:00). Patient did not receive TPA this admission, currently on Eliquis and clopidogrel. CT head shows no intraluminal thrombus or dissection. Incidental right MCA bifurcation aneurysm. Mild chronic microvascular changes without evidence of an acute transcortical infarction or hemorrhage.- Continue to medications of cefuroxime 500 twice a day for current UTI. Cardiology was consulted and recommended d/c Amiodarone in setting of possible medication induced hyperthyroidism. Home dose Losartan was held and BB was started. Endocrinology was consulted. Thyroid studies were done and US thyroid was completed revealing no thyroid nodules. MRI brain confirmed no evidence of acute CVA. EEG study was completed and deemed negative. 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 These are to be continued outpatient. Pt was seen and evaluated on day of discharge. Pt is medically optimized for discharge with close follow up outpatient with PMD, Cardiologist, Neurologist and Endocrinologist.         ---    CONSULTANTS:     Cardio: Dr. Sweeney    Neuro: Dr. Jon     Endo: Dr. Perlman        ---    FINAL DISCHARGE DIAGNOSIS LIST:    Please see last daily progress note for final discharge diagnoses FROM ADMISSION H+P:     HPI:    Patient is a 50 yo male with PMHx significant of CAD s/p BMS to RCA (8/2019), RUL subsegmental PE (6/2019, on Eliquis), hx of NSTEMI and PAF s/p ex-lap omentopexy and plication for perforated antral ulcer (5/2019), osteomyelitis s/p debridement, CVA s/p tPA (8/2019), HTN and DM2 (not on insulin) presenting to the ED with right sided weakness, facial droop, and slurred speech. Patient states that around 530pm today he suddenly experienced blurred vision, right sided weakness, and right sided facial drooping. He is currently in a rehab facility. There he notified his roommate who alerted the nurse, who then called for a transport to the emergency room. Patient was not a candidate for tPA due to his comorbidities. Patient endorses SOB at the time he was experiencing his symptoms of weakness and facial drooping. He is currently being treated for a UTI with ciprofloxacin(day 2) that was diagnosed earlier this week. Of note, in Aug of this year he presented with similar symptoms that was treated with tPA. Since then, the patient states that he was able to recover all weakness except for right lower extremity. He currently receives PT/OT 5x a week in the rehab facility. Patient denies chest pain, SOB, fever, chills, vomiting and diarrhea. Patient endorses nausea and mild abdominal pain that he says is resolving since his diagnoses of UTI. He has a chronic urinary catheter in place for his chronic urinary retention.                 ---    HOSPITAL COURSE: Pt was seen and examined in ED. Patient admitted for r/o CVA. Right sided facial drooping (improved while in ER), Right sided weakness in upper and lower extremity. Patient had previous CVA on 8/2019 s/p TPA in the ED (around 22:00). Patient did not receive TPA this admission, currently on Eliquis and clopidogrel. CT head shows no intraluminal thrombus or dissection. Incidental right MCA bifurcation aneurysm. Mild chronic microvascular changes without evidence of an acute transcortical infarction or hemorrhage. Cardiology was consulted and recommended d/c Amiodarone in setting of possible medication induced hyperthyroidism. Home dose Losartan was held and BB was started. Endocrinology was consulted. Thyroid studies were done and US thyroid was completed revealing no thyroid nodules. MRI brain confirmed no evidence of acute CVA. EEG study was completed and deemed negative. 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 These are to be continued outpatient. Pt was seen and evaluated on day of discharge. Pt is medically optimized for discharge with close follow up outpatient with PMD, Cardiologist and Endocrinologist.         ---    CONSULTANTS:     Cardio: Dr. Sweeney    Neuro: Dr. Jon     Endo: Dr. Perlman        ---    FINAL DISCHARGE DIAGNOSIS LIST:    Please see last daily progress note for final discharge diagnoses FROM ADMISSION H+P:     HPI:    Patient is a 48 yo male with PMHx significant of CAD s/p BMS to RCA (8/2019), RUL subsegmental PE (6/2019, on Eliquis), hx of NSTEMI and PAF s/p ex-lap omentopexy and plication for perforated antral ulcer (5/2019), osteomyelitis s/p debridement, CVA s/p tPA (8/2019), HTN and DM2 (not on insulin) presenting to the ED with right sided weakness, facial droop, and slurred speech. Patient states that around 530pm today he suddenly experienced blurred vision, right sided weakness, and right sided facial drooping. He is currently in a rehab facility. There he notified his roommate who alerted the nurse, who then called for a transport to the emergency room. Patient was not a candidate for tPA due to his comorbidities. Patient endorses SOB at the time he was experiencing his symptoms of weakness and facial drooping. He is currently being treated for a UTI with ciprofloxacin(day 2) that was diagnosed earlier this week. Of note, in Aug of this year he presented with similar symptoms that was treated with tPA. Since then, the patient states that he was able to recover all weakness except for right lower extremity. He currently receives PT/OT 5x a week in the rehab facility. Patient denies chest pain, SOB, fever, chills, vomiting and diarrhea. Patient endorses nausea and mild abdominal pain that he says is resolving since his diagnoses of UTI. He has a chronic urinary catheter in place for his chronic urinary retention.                 ---    HOSPITAL COURSE: Pt was seen and examined in ED. Patient admitted for r/o CVA. Right sided facial drooping (improved while in ER), Right sided weakness in upper and lower extremity. Patient had previous CVA on 8/2019 s/p TPA in the ED (around 22:00). Patient did not receive TPA this admission, currently on Eliquis and clopidogrel. CT head shows no intraluminal thrombus or dissection. Incidental right MCA bifurcation aneurysm. Mild chronic microvascular changes without evidence of an acute transcortical infarction or hemorrhage. Cardiology was consulted and recommended d/c Amiodarone in setting of possible medication induced hyperthyroidism. Metoprolol tartate was started BID. Endocrinology was consulted. Thyroid studies were done and US thyroid was completed revealing no thyroid nodules. MRI brain confirmed no evidence of acute CVA. EEG study was completed and deemed negative. 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 These are to be continued outpatient. Pt was seen and evaluated on day of discharge. Pt is medically optimized for discharge with close follow up outpatient with PMD, Cardiologist and Endocrinologist.         ---    CONSULTANTS:     Cardio: Dr. Sweeney    Neuro: Dr. Jon     Endo: Dr. Perlman        ---    FINAL DISCHARGE DIAGNOSIS LIST:    Please see last daily progress note for final discharge diagnoses

## 2019-10-28 NOTE — PROGRESS NOTE ADULT - ASSESSMENT
50 yo male with PMHx significant of CAD s/p BMS to RCA (8/2019), RUL subsegmental PE (6/2019, on Eliquis), hx of NSTEMI and PAF s/p ex-lap omentopexy and plication for perforated antral ulcer (5/2019), osteomyelitis s/p debridement, CVA s/p tPA (8/2019), HTN and DM2 (not on insulin) presenting to the ED with right sided weakness, facial droop, and slurred speech, ? amiodarone induced hyperthyroidism:    Neuro symptoms; TIA?  - MRI/CT head negative.  Awaiting EEG  - No further neuro symptoms.  No evidence of significant arrhythmias on tele.  He remained in [facility] overnight  - Neuro following  - Can discontinue telemetry    Afib  - He is [facility] on tele.  D/C Amiodarone as   - Follow up thyroid studies  - Follow up endocrine consult  - WIll consider adding bb.  Patient does not appear thyrotoxic at this point  - For CAD and history of PCI, can continue Plavix 75 QD  - Continue Eliquis for PAF and PE  -Continue high intesity statin  - Continue Imdur  - Losartan held  - To follow    Henrietta Kim DNP, NP-C  Cardiology   Spectra #[phone] 48 yo male with PMHx significant of CAD s/p BMS to RCA (8/2019), RUL subsegmental PE (6/2019, on Eliquis), hx of NSTEMI and PAF s/p ex-lap omentopexy and plication for perforated antral ulcer (5/2019), osteomyelitis s/p debridement, CVA s/p tPA (8/2019), HTN and DM2 (not on insulin) presenting to the ED with right sided weakness, facial droop, and slurred speech, ? amiodarone induced hyperthyroidism:    Neuro symptoms; TIA? s/p CVA  - MRI/CT head negative.  Awaiting EEG  - No further neuro symptoms.  No evidence of significant arrhythmias on tele.  He remained in [facility] overnight  - Neuro following  - Can discontinue telemetry    Afib  - He is [facility] on tele.  D/C Amiodarone as it may causing his TSH to be very near non-existent.  Patient does not appear thyrotoxic at this point  - Endo following.  For thyroid USD  - Cab start low dose BB.    - Monitor electrolytes, replete to keep K>4 and Mag>2    CAD s/p PCI  - Continue Plavix 75 QD  - Continue high intensity statin  - Continue Imdur  - Can resume Losartan a lower dose    DM2  - Endo following    Other cardiac w/u pending clinical course  To follow    Henrietta Kim DNP, NP-C  Cardiology   Spectra #[phone] 50 yo male with PMHx significant of CAD s/p BMS to RCA (8/2019), RUL subsegmental PE (6/2019, on Eliquis), hx of NSTEMI and PAF s/p ex-lap omentopexy and plication for perforated antral ulcer (5/2019), osteomyelitis s/p debridement, CVA s/p tPA (8/2019), HTN and DM2 (not on insulin) presenting to the ED with right sided weakness, facial droop, and slurred speech, ? amiodarone induced hyperthyroidism:    Neuro symptoms; TIA? s/p CVA  - MRI/CT head negative.  Awaiting EEG  - No further neuro symptoms.  No evidence of significant arrhythmias on tele.  He remained in [facility] overnight  - Neuro following  - Can discontinue telemetry    Afib  - He is [facility] on tele.   Amiodarone discontinued as it may causing his TSH to be very near non-existent.  Patient does not appear thyrotoxic at this point  - Endo following.  For thyroid USD  - Cab start low dose BB.    - Monitor electrolytes, replete to keep K>4 and Mag>2    CAD s/p PCI  - Continue Plavix 75 QD  - Continue high intensity statin  - Continue Imdur  - Can resume Losartan a lower dose which may help his K    DM2  - Endo following    Other cardiac w/u pending clinical course  To follow    Henrietta Kim DNP, NP-C  Cardiology   Spectra #[phone]

## 2019-10-28 NOTE — DISCHARGE NOTE PROVIDER - CARE PROVIDER_API CALL
Jeovany France)  Internal Medicine  [address] [place], [place] [id number]  Phone: [phone]  Fax: [phone]  Follow Up Time:     Linette Rico  Phone: (   )    -  Fax: (   )    -  Follow Up Time:     Perlman, Craig D ()  [address], [address]  [place] [address]  Phone: [phone]  Fax: [phone]  Follow Up Time: Jeovany France)  Internal Medicine  [address] [place], [place] [id number]  Phone: [phone]  Fax: [phone]  Follow Up Time:     Perlman, Craig D ()  Medicine  [address] [place] [address], [address]  [place] [address]  Phone: [phone]  Fax: [phone]  Follow Up Time:     Linette Rico  Phone: (   )    -  Fax: (   )    -  Follow Up Time:     Stone Jon)  Neurology  [address] [place] [address]  Phone: [phone]  Fax: [phone]  Follow Up Time: Jeovany France)  Internal Medicine  [address] [place], [place] [id number]  Phone: [phone]  Fax: [phone]  Follow Up Time: 1 week    Perlman, Craig D ()  [address], [address]  [place] [address]  Phone: [phone]  Fax: [phone]  Follow Up Time: 1 week    Stone Jon)  Neurology  [address] [place] [address]  Phone: [phone]  Fax: [phone]  Follow Up Time: 1 week    Linette Rico  Phone: (   )    -  Fax: (   )    -  Follow Up Time: 1 week

## 2019-10-28 NOTE — CONSULT NOTE ADULT - PROBLEM SELECTOR RECOMMENDATION 2
likely due to amiodarone, started in may 2019  tfts c/w subclinical hyperthyroidism  check thyroid US  ?type 1 functional hyperthyroidism vs. type 2 thyroiditis  may require both methimazole and prednisone as unable to elucidate etiology in this setting

## 2019-10-28 NOTE — DISCHARGE NOTE PROVIDER - NSDCCPCAREPLAN_GEN_ALL_CORE_FT
PRINCIPAL DISCHARGE DIAGNOSIS  Diagnosis: Right sided weakness  Assessment and Plan of Treatment:       SECONDARY DISCHARGE DIAGNOSES  Diagnosis: BPH (benign prostatic hyperplasia)  Assessment and Plan of Treatment: Continue home dose medications.    Diagnosis: Hypertension  Assessment and Plan of Treatment: Continue home dose medications.    Diagnosis: Afib  Assessment and Plan of Treatment: Afib    Diagnosis: Type 2 diabetes mellitus  Assessment and Plan of Treatment: Continue home dose medications.    Diagnosis: Hyperthyroidism  Assessment and Plan of Treatment: Hyperthyroidism PRINCIPAL DISCHARGE DIAGNOSIS  Diagnosis: Right sided weakness  Assessment and Plan of Treatment: Your CT and MRI was negative for stroke. You were seen by a neurologist during your stay. Please follow up outpatient with you PMD within one week of discharge.      SECONDARY DISCHARGE DIAGNOSES  Diagnosis: Thyrotoxicosis  Assessment and Plan of Treatment: START taking the following:  Prednisone 10mg daily  Methimazole 10mg daily  Please follow up outpatient with endocronologist for further managment.    Diagnosis: BPH (benign prostatic hyperplasia)  Assessment and Plan of Treatment: Continue home dose medication tamsulosin.    Diagnosis: Hypertension  Assessment and Plan of Treatment: Continue home dose medications.    Diagnosis: Afib  Assessment and Plan of Treatment: Continue home dose medications abixiban and clopidigrel.  Please STOP taking amiodarone.   Please START taking metoprolol tartate 25mg twice daily.    Diagnosis: Type 2 diabetes mellitus  Assessment and Plan of Treatment: Continue home dose medications. PRINCIPAL DISCHARGE DIAGNOSIS  Diagnosis: Right sided weakness  Assessment and Plan of Treatment: You were seen by a neurologist during your stay. Your CT and MRI ruled out an acute stroke. You will be discharged to subacute rehab to work on increasing strenght in your lower extremities. Please follow up outpatient with your neurologist and PMD within one week of discharge.      SECONDARY DISCHARGE DIAGNOSES  Diagnosis: Thyrotoxicosis  Assessment and Plan of Treatment: START taking the following:  Prednisone 10mg daily  Methimazole 10mg daily  Please follow up outpatient with your endocronologist for further managment within one week of discharge.    Diagnosis: BPH (benign prostatic hyperplasia)  Assessment and Plan of Treatment: Continue home dose medication tamsulosin.      Diagnosis: Hypertension  Assessment and Plan of Treatment: Continue home dose medications.    Diagnosis: Afib  Assessment and Plan of Treatment: Continue home dose medications abixiban and clopidigrel.  Please STOP taking amiodarone.   Please START taking metoprolol tartate 25mg twice daily.  Please follow up with your cardiologist for further medical managment within one week of discharge.    Diagnosis: Type 2 diabetes mellitus  Assessment and Plan of Treatment: Continue home dose medications.

## 2019-10-28 NOTE — CONSULT NOTE ADULT - SUBJECTIVE AND OBJECTIVE BOX
[facility] - Bolivar Carbajal, El, Brittany, Zahra, Román Jacobs  Office Number: [phone]    Initial Consult Note    CHIEF COMPLAINT: Patient is a 49y old  Male who presents with a chief complaint of Right sided weakness and facial droop       HPI:  Patient is a 48 yo male with PMHx significant of CAD s/p BMS to RCA (8/2019), RUL subsegmental PE (6/2019, on Eliquis), hx of NSTEMI and PAF s/p ex-lap omentopexy and plication for perforated antral ulcer (5/2019), osteomyelitis s/p debridement, CVA s/p tPA (8/2019), HTN and DM2 (not on insulin) presenting to the ED with right sided weakness, facial droop, and slurred speech. Patient states that around 530pm yesterday he suddenly experienced blurred vision, right sided weakness, and right sided facial drooping. He is currently in a rehab facility. There he notified his roommate who alerted the nurse, who then called for a transport to the emergency room. Patient was not a candidate for tPA due to his comorbidities. Patient endorses SOB at the time he was experiencing his symptoms of weakness and facial drooping. He is currently being treated for a UTI with ciprofloxacin(day 3) that was diagnosed earlier this week. Of note, in Aug of this year he presented with similar symptoms that was treated with tPA. Since then, the patient states that he was able to recover all weakness except for right lower extremity. He currently receives PT/OT 5x a week in the rehab facility. Patient denies chest pain, SOB, fever, chills, vomiting and diarrhea. Patient endorses nausea and mild abdominal pain that he says is resolving since his diagnoses of UTI. He has a chronic urinary catheter in place for his chronic urinary retention.         In the ED:   Vitals: /72 HR 79 RR 19 SpO2 94 % @RA  Labs: h/h 9.9/32.6, BMP WNL except for alb 2.6. CE x1 normal   EKG Sinus rhythm with premature atrial complexes   CTA head:   1. Widely patent head and neck vasculature. No intraluminal thrombus or   dissection noted. 2. Incidental right MCA bifurcation aneurysm. Interval reassessment and   follow-up is recommended.  CT head:  Mild chronic microvascular changes without evidence of an   acute transcortical infarction or hemorrhage.    This AM, he reports that he feels well.  He denies chest pain, dyspnea, PND, orthopnea, LE swelling, dizziness, or syncope.  He is found to have a low TSH and is on amiodarone.  He has no symptoms to suggest hyperthyroidism.       PAST MEDICAL & SURGICAL HISTORY:  Cerebrovascular accident  CAD S/P percutaneous coronary angioplasty  Osteomyelitis: s/p debridement  History of non-ST elevation myocardial infarction (NSTEMI)  Pulmonary embolism  Perforated gastric ulcer: s/p emergent ex-lap omentopexy and plication 6/2019  BPH (benign prostatic hyperplasia)  Hypertension  Afib  Diabetes mellitus with no complication  Diabetes  Traumatic amputation of left foot, initial encounter  Perforated gastric ulcer  H/O abdominal surgery      SOCIAL HISTORY:  No tobacco, ethanol, or drug abuse.    FAMILY HISTORY:  FH: stroke: Father  FH: coronary artery disease: Father  FH: pulmonary embolism: Mother      MEDICATIONS  (STANDING):  aMIOdarone    Tablet 100 milliGRAM(s) Oral daily  apixaban 5 milliGRAM(s) Oral every 12 hours  atorvastatin 80 milliGRAM(s) Oral at bedtime  clopidogrel Tablet 75 milliGRAM(s) Oral daily  dextrose 5%. 1000 milliLiter(s) (50 mL/Hr) IV Continuous <Continuous>  dextrose 50% Injectable 12.5 Gram(s) IV Push once  dextrose 50% Injectable 25 Gram(s) IV Push once  dextrose 50% Injectable 25 Gram(s) IV Push once  insulin glargine Injectable (LANTUS) 6 Unit(s) SubCutaneous at bedtime  insulin lispro (HumaLOG) corrective regimen sliding scale   SubCutaneous three times a day before meals  insulin lispro (HumaLOG) corrective regimen sliding scale   SubCutaneous at bedtime  insulin lispro Injectable (HumaLOG) 5 Unit(s) SubCutaneous three times a day before meals  isosorbide   mononitrate ER Tablet (IMDUR) 30 milliGRAM(s) Oral daily  pantoprazole    Tablet 40 milliGRAM(s) Oral before breakfast  sucralfate 1 Gram(s) Oral four times a day  tamsulosin 0.4 milliGRAM(s) Oral at bedtime    MEDICATIONS  (PRN):  dextrose 40% Gel 15 Gram(s) Oral once PRN Blood Glucose LESS THAN 70 milliGRAM(s)/deciliter  glucagon  Injectable 1 milliGRAM(s) IntraMuscular once PRN Glucose LESS THAN 70 milligrams/deciliter      Allergies    fish (Hives)  No Known Drug Allergies    Intolerances        REVIEW OF SYSTEMS:    All other review of systems is negative unless indicated above    VITAL SIGNS:   Vital Signs Last 24 Hrs  T(C): 36.7 (27 Oct 2019 08:09), Max: 36.9 (26 Oct 2019 13:15)  T(F): 98.1 (27 Oct 2019 08:09), Max: 98.5 (26 Oct 2019 23:34)  HR: 64 (27 Oct 2019 08:09) (62 - 79)  BP: 139/76 (27 Oct 2019 08:09) (113/68 - 145/72)  BP(mean): --  RR: 19 (27 Oct 2019 08:09) (17 - 20)  SpO2: 88% (27 Oct 2019 08:09) (88% - 96%)    I&O's Summary    26 Oct 2019 07:01  -  27 Oct 2019 07:00  --------------------------------------------------------  IN: 0 mL / OUT: 3100 mL / NET: -3100 mL        On Exam:    Constitutional: NAD, alert and oriented x 3  Lungs:  Non-labored, breath sounds are clear bilaterally, No wheezing, rales or rhonchi  Cardiovascular: RRR.  S1 and S2 positive.  No murmurs, rubs, gallops or clicks  Gastrointestinal: Bowel Sounds present, soft, nontender.   Lymph: No peripheral edema. No cervical lymphadenopathy.  Neurological: Alert, no focal deficits  Skin: No rashes or ulcers   Psych:  Mood & affect appropriate.    LABS: All Labs Reviewed:                        9.1    7.40  )-----------( 351      ( 27 Oct 2019 07:19 )             30.0                         9.2    7.18  )-----------( 287      ( 26 Oct 2019 07:24 )             31.0                         9.9    7.82  )-----------( 278      ( 25 Oct 2019 19:18 )             32.6     27 Oct 2019 07:19    144    |  107    |  16     ----------------------------<  158    3.5     |  29     |  0.93   26 Oct 2019 07:24    142    |  106    |  18     ----------------------------<  112    3.3     |  28     |  0.94   25 Oct 2019 19:18    143    |  107    |  21     ----------------------------<  90     3.8     |  26     |  1.10     Ca    8.7        27 Oct 2019 07:19  Ca    8.3        26 Oct 2019 07:24  Ca    8.5        25 Oct 2019 19:18    TPro  6.8    /  Alb  2.6    /  TBili  0.3    /  DBili  x      /  AST  30     /  ALT  40     /  AlkPhos  105    25 Oct 2019 19:18    PT/INR - ( 25 Oct 2019 19:18 )   PT: 19.5 sec;   INR: 1.70 ratio         PTT - ( 25 Oct 2019 19:18 )  PTT:37.2 sec  CARDIAC MARKERS ( 25 Oct 2019 19:18 )  .034 ng/mL / x     / 44 U/L / x     / <1.0 ng/mL      Blood Culture:     10-27 @ 07:19  TSH: 0.01  10-26 @ 07:24  TSH: 0.01
Patient is a 49y old  Male who presents with a chief complaint of Right sided weakness and facial droop (27 Oct 2019 12:42)      Reason For Consult: abnl tfts, dm2 uncontrolled    HPI:  Patient is a 50 yo male with PMHx significant of CAD s/p BMS to RCA (8/2019), RUL subsegmental PE (6/2019, on Eliquis), hx of NSTEMI and PAF s/p ex-lap omentopexy and plication for perforated antral ulcer (5/2019), osteomyelitis s/p debridement, CVA s/p tPA (8/2019), HTN and DM2 (not on insulin) presenting to the ED with right sided weakness, facial droop, and slurred speech. Patient states that around 530pm today he suddenly experienced blurred vision, right sided weakness, and right sided facial drooping. He is currently in a rehab facility. There he notified his roommate who alerted the nurse, who then called for a transport to the emergency room. Patient was not a candidate for tPA due to his comorbidities. Patient endorses SOB at the time he was experiencing his symptoms of weakness and facial drooping. He is currently being treated for a UTI with ciprofloxacin(day 2) that was diagnosed earlier this week. Of note, in Aug of this year he presented with similar symptoms that was treated with tPA. Since then, the patient states that he was able to recover all weakness except for right lower extremity. He currently receives PT/OT 5x a week in the rehab facility. Patient denies chest pain, SOB, fever, chills, vomiting and diarrhea. Patient endorses nausea and mild abdominal pain that he says is resolving since his diagnoses of UTI. He has a chronic urinary catheter in place for his chronic urinary retention.         In the ED:   Vitals: /72 HR 79 RR 19 SpO2 94 % @RA  Labs: h/h 9.9/32.6, BMP WNL except for alb 2.6. CE x1 normal   EKG Sinus rhythm with premature atrial complexes   CTA head:   1. Widely patent head and neck vasculature. No intraluminal thrombus or   dissection noted. 2. Incidental right MCA bifurcation aneurysm. Interval reassessment and   follow-up is recommended.  CT head:  Mild chronic microvascular changes without evidence of an   acute transcortical infarction or hemorrhage. (25 Oct 2019 21:35)      PAST MEDICAL & SURGICAL HISTORY:  Cerebrovascular accident  CAD S/P percutaneous coronary angioplasty  Osteomyelitis: s/p debridement  History of non-ST elevation myocardial infarction (NSTEMI)  Pulmonary embolism  Perforated gastric ulcer: s/p emergent ex-lap omentopexy and plication 6/2019  BPH (benign prostatic hyperplasia)  Hypertension  Afib  Diabetes mellitus with no complication  Diabetes  Traumatic amputation of left foot, initial encounter  Perforated gastric ulcer  H/O abdominal surgery      FAMILY HISTORY:  FH: stroke: Father  FH: coronary artery disease: Father  FH: pulmonary embolism: Mother        Social History:    MEDICATIONS  (STANDING):  apixaban 5 milliGRAM(s) Oral every 12 hours  atorvastatin 80 milliGRAM(s) Oral at bedtime  clopidogrel Tablet 75 milliGRAM(s) Oral daily  dextrose 5%. 1000 milliLiter(s) (50 mL/Hr) IV Continuous <Continuous>  dextrose 50% Injectable 12.5 Gram(s) IV Push once  dextrose 50% Injectable 25 Gram(s) IV Push once  dextrose 50% Injectable 25 Gram(s) IV Push once  insulin glargine Injectable (LANTUS) 6 Unit(s) SubCutaneous at bedtime  insulin lispro (HumaLOG) corrective regimen sliding scale   SubCutaneous three times a day before meals  insulin lispro (HumaLOG) corrective regimen sliding scale   SubCutaneous at bedtime  insulin lispro Injectable (HumaLOG) 5 Unit(s) SubCutaneous three times a day before meals  isosorbide   mononitrate ER Tablet (IMDUR) 30 milliGRAM(s) Oral daily  pantoprazole    Tablet 40 milliGRAM(s) Oral before breakfast  sucralfate 1 Gram(s) Oral four times a day  tamsulosin 0.4 milliGRAM(s) Oral at bedtime    MEDICATIONS  (PRN):  dextrose 40% Gel 15 Gram(s) Oral once PRN Blood Glucose LESS THAN 70 milliGRAM(s)/deciliter  glucagon  Injectable 1 milliGRAM(s) IntraMuscular once PRN Glucose LESS THAN 70 milligrams/deciliter        T(C): 36.8 (10-28-19 @ 04:47), Max: 37.1 (10-27-19 @ 23:43)  HR: 64 (10-28-19 @ 04:47) (60 - 78)  BP: 115/62 (10-28-19 @ 04:47) (103/65 - 139/84)  RR: 18 (10-28-19 @ 04:47) (16 - 18)  SpO2: 92% (10-28-19 @ 04:47) (92% - 97%)  Wt(kg): --    PHYSICAL EXAM:  GENERAL: NAD, well-groomed, well-developed  HEAD:  Atraumatic, Normocephalic  NECK: Supple, No JVD, Normal thyroid  CHEST/LUNG: Clear to percussion bilaterally; No rales, rhonchi, wheezing, or rubs  HEART: Regular rate and rhythm; No murmurs, rubs, or gallops  ABDOMEN: Soft, Nontender, Nondistended; Bowel sounds present  EXTREMITIES:  2+ Peripheral Pulses, No clubbing, cyanosis, or edema  SKIN: No rashes or lesions    CAPILLARY BLOOD GLUCOSE      POCT Blood Glucose.: 164 mg/dL (28 Oct 2019 08:16)  POCT Blood Glucose.: 286 mg/dL (27 Oct 2019 21:46)  POCT Blood Glucose.: 203 mg/dL (27 Oct 2019 16:46)  POCT Blood Glucose.: 196 mg/dL (27 Oct 2019 11:44)                            9.1    7.40  )-----------( 351      ( 27 Oct 2019 07:19 )             30.0       CMP:  10-27 @ 07:19  SGPT --  Albumin --   Alk Phos --   Anion Gap 8   SGOT --   Total Bili --   BUN 16   Calcium Total 8.7   CO2 29   Chloride 107   Creatinine 0.93   eGFR if    eGFR if non AA 95   Glucose 158   Potassium 3.5   Protein --   Sodium 144      Thyroid Function Tests:  10-27 @ 11:52 TSH -- FreeT4 -- T3 97 Anti TPO -- Anti Thyroglobulin Ab -- TSI --  10-27 @ 07:19 TSH 0.01 FreeT4 -- T3 -- Anti TPO -- Anti Thyroglobulin Ab -- TSI --      Diabetes Tests: 10-27 @ 12:02 HbA1c 7.4 C-Peptide --         Radiology:

## 2019-10-29 LAB
ANION GAP SERPL CALC-SCNC: 6 MMOL/L — SIGNIFICANT CHANGE UP (ref 5–17)
BUN SERPL-MCNC: 13 MG/DL — SIGNIFICANT CHANGE UP (ref 7–23)
CALCIUM SERPL-MCNC: 8.2 MG/DL — LOW (ref 8.5–10.1)
CHLORIDE SERPL-SCNC: 108 MMOL/L — SIGNIFICANT CHANGE UP (ref 96–108)
CO2 SERPL-SCNC: 28 MMOL/L — SIGNIFICANT CHANGE UP (ref 22–31)
CREAT SERPL-MCNC: 0.89 MG/DL — SIGNIFICANT CHANGE UP (ref 0.5–1.3)
GLUCOSE SERPL-MCNC: 180 MG/DL — HIGH (ref 70–99)
HCT VFR BLD CALC: 29.2 % — LOW (ref 39–50)
HGB BLD-MCNC: 8.9 G/DL — LOW (ref 13–17)
MCHC RBC-ENTMCNC: 25 PG — LOW (ref 27–34)
MCHC RBC-ENTMCNC: 30.5 GM/DL — LOW (ref 32–36)
MCV RBC AUTO: 82 FL — SIGNIFICANT CHANGE UP (ref 80–100)
NRBC # BLD: 0 /100 WBCS — SIGNIFICANT CHANGE UP (ref 0–0)
PLATELET # BLD AUTO: 408 K/UL — HIGH (ref 150–400)
POTASSIUM SERPL-MCNC: 3.9 MMOL/L — SIGNIFICANT CHANGE UP (ref 3.5–5.3)
POTASSIUM SERPL-SCNC: 3.9 MMOL/L — SIGNIFICANT CHANGE UP (ref 3.5–5.3)
RBC # BLD: 3.56 M/UL — LOW (ref 4.2–5.8)
RBC # FLD: 18.9 % — HIGH (ref 10.3–14.5)
SODIUM SERPL-SCNC: 142 MMOL/L — SIGNIFICANT CHANGE UP (ref 135–145)
WBC # BLD: 8.38 K/UL — SIGNIFICANT CHANGE UP (ref 3.8–10.5)
WBC # FLD AUTO: 8.38 K/UL — SIGNIFICANT CHANGE UP (ref 3.8–10.5)

## 2019-10-29 PROCEDURE — 99232 SBSQ HOSP IP/OBS MODERATE 35: CPT

## 2019-10-29 RX ORDER — METOPROLOL TARTRATE 50 MG
1 TABLET ORAL
Qty: 0 | Refills: 0 | DISCHARGE
Start: 2019-10-29

## 2019-10-29 RX ORDER — INSULIN LISPRO 100/ML
VIAL (ML) SUBCUTANEOUS
Refills: 0 | Status: DISCONTINUED | OUTPATIENT
Start: 2019-10-29 | End: 2019-10-31

## 2019-10-29 RX ORDER — METHIMAZOLE 10 MG/1
1 TABLET ORAL
Qty: 0 | Refills: 0 | DISCHARGE
Start: 2019-10-29

## 2019-10-29 RX ORDER — INSULIN LISPRO 100/ML
VIAL (ML) SUBCUTANEOUS AT BEDTIME
Refills: 0 | Status: DISCONTINUED | OUTPATIENT
Start: 2019-10-29 | End: 2019-10-31

## 2019-10-29 RX ORDER — METOPROLOL TARTRATE 50 MG
25 TABLET ORAL
Refills: 0 | Status: DISCONTINUED | OUTPATIENT
Start: 2019-10-29 | End: 2019-10-31

## 2019-10-29 RX ADMIN — APIXABAN 5 MILLIGRAM(S): 2.5 TABLET, FILM COATED ORAL at 17:59

## 2019-10-29 RX ADMIN — Medication 1 GRAM(S): at 05:12

## 2019-10-29 RX ADMIN — Medication 5 UNIT(S): at 08:27

## 2019-10-29 RX ADMIN — Medication 25 MILLIGRAM(S): at 17:58

## 2019-10-29 RX ADMIN — Medication 5 UNIT(S): at 12:38

## 2019-10-29 RX ADMIN — ISOSORBIDE MONONITRATE 30 MILLIGRAM(S): 60 TABLET, EXTENDED RELEASE ORAL at 12:38

## 2019-10-29 RX ADMIN — Medication 1 GRAM(S): at 17:59

## 2019-10-29 RX ADMIN — Medication 10 MILLIGRAM(S): at 12:38

## 2019-10-29 RX ADMIN — CLOPIDOGREL BISULFATE 75 MILLIGRAM(S): 75 TABLET, FILM COATED ORAL at 12:38

## 2019-10-29 RX ADMIN — APIXABAN 5 MILLIGRAM(S): 2.5 TABLET, FILM COATED ORAL at 05:12

## 2019-10-29 RX ADMIN — LOSARTAN POTASSIUM 25 MILLIGRAM(S): 100 TABLET, FILM COATED ORAL at 05:12

## 2019-10-29 RX ADMIN — Medication 1 GRAM(S): at 23:19

## 2019-10-29 RX ADMIN — Medication 5 UNIT(S): at 17:58

## 2019-10-29 RX ADMIN — Medication 2: at 12:38

## 2019-10-29 RX ADMIN — Medication 1 GRAM(S): at 12:37

## 2019-10-29 RX ADMIN — TAMSULOSIN HYDROCHLORIDE 0.4 MILLIGRAM(S): 0.4 CAPSULE ORAL at 21:31

## 2019-10-29 RX ADMIN — ATORVASTATIN CALCIUM 80 MILLIGRAM(S): 80 TABLET, FILM COATED ORAL at 21:31

## 2019-10-29 RX ADMIN — INSULIN GLARGINE 6 UNIT(S): 100 INJECTION, SOLUTION SUBCUTANEOUS at 21:31

## 2019-10-29 RX ADMIN — Medication 2: at 17:58

## 2019-10-29 RX ADMIN — PANTOPRAZOLE SODIUM 40 MILLIGRAM(S): 20 TABLET, DELAYED RELEASE ORAL at 05:12

## 2019-10-29 NOTE — PROGRESS NOTE ADULT - SUBJECTIVE AND OBJECTIVE BOX
Patient is a 49y old  Male who presents with a chief complaint of Right sided weakness and facial droop.      INTERVAL HPI/OVERNIGHT EVENTS: Patient examined at bedside. Patient admits ******. Patient denies N/V/D, chest pain, SOB, abdominal pain. No other complaints at this time. No acute overnight events appreciated.    T(C): 36.7 (10-29-19 @ 04:47), Max: 36.9 (10-28-19 @ 08:27)  HR: 59 (10-29-19 @ 04:47) (55 - 75)  BP: 145/72 (10-29-19 @ 04:47) (122/71 - 156/86)  RR: 18 (10-29-19 @ 04:47) (18 - 18)  SpO2: 91% (10-29-19 @ 04:47) (91% - 99%)  Wt(kg): --  I&O's Summary    28 Oct 2019 07:01  -  29 Oct 2019 07:00  --------------------------------------------------------  IN: 360 mL / OUT: 2900 mL / NET: -2540 mL        LABS:                        8.9    8.38  )-----------( 408      ( 29 Oct 2019 07:13 )             29.2     10-28    144  |  110<H>  |  15  ----------------------------<  161<H>  3.5   |  28  |  0.88    Ca    8.5      28 Oct 2019 08:17          CAPILLARY BLOOD GLUCOSE      POCT Blood Glucose.: 248 mg/dL (28 Oct 2019 21:49)  POCT Blood Glucose.: 191 mg/dL (28 Oct 2019 17:03)  POCT Blood Glucose.: 142 mg/dL (28 Oct 2019 11:59)  POCT Blood Glucose.: 164 mg/dL (28 Oct 2019 08:16)            MEDICATIONS  (STANDING):  apixaban 5 milliGRAM(s) Oral every 12 hours  atorvastatin 80 milliGRAM(s) Oral at bedtime  clopidogrel Tablet 75 milliGRAM(s) Oral daily  dextrose 5%. 1000 milliLiter(s) (50 mL/Hr) IV Continuous <Continuous>  dextrose 50% Injectable 12.5 Gram(s) IV Push once  dextrose 50% Injectable 25 Gram(s) IV Push once  dextrose 50% Injectable 25 Gram(s) IV Push once  insulin glargine Injectable (LANTUS) 6 Unit(s) SubCutaneous at bedtime  insulin lispro (HumaLOG) corrective regimen sliding scale   SubCutaneous three times a day before meals  insulin lispro (HumaLOG) corrective regimen sliding scale   SubCutaneous at bedtime  insulin lispro Injectable (HumaLOG) 5 Unit(s) SubCutaneous three times a day before meals  isosorbide   mononitrate ER Tablet (IMDUR) 30 milliGRAM(s) Oral daily  losartan 25 milliGRAM(s) Oral daily  pantoprazole    Tablet 40 milliGRAM(s) Oral before breakfast  sucralfate 1 Gram(s) Oral four times a day  tamsulosin 0.4 milliGRAM(s) Oral at bedtime    MEDICATIONS  (PRN):  dextrose 40% Gel 15 Gram(s) Oral once PRN Blood Glucose LESS THAN 70 milliGRAM(s)/deciliter  glucagon  Injectable 1 milliGRAM(s) IntraMuscular once PRN Glucose LESS THAN 70 milligrams/deciliter    REVIEW OF SYSTEMS:  CONSTITUTIONAL: No fever, weight loss, or fatigue  EYES: No eye pain, visual disturbances, or discharge  ENMT:  No difficulty hearing, tinnitus, No sinus or throat pain  NECK: No pain or stiffness  RESPIRATORY: No cough, wheezing, chills or hemoptysis; No shortness of breath  CARDIOVASCULAR: No chest pain, palpitations, dizziness, or leg swelling  GASTROINTESTINAL: No abdominal or epigastric pain. No nausea, vomiting, or hematemesis; No diarrhea or constipation. No melena or hematochezia.  NEUROLOGICAL: No headaches, memory loss. Admits to R LE weakness.   MUSCULOSKELETAL: No joint pain or swelling; No muscle, back, or extremity pain  PSYCHIATRIC: No depression, anxiety    RADIOLOGY & ADDITIONAL TESTS: No acute imaging in the last 24 hours    Imaging Personally Reviewed:  [ X ] YES  [ ] NO    Consultant(s) Notes Reviewed:  [ X ] YES  [ ] NO    Physical EXAM:  GENERAL: NAD, well-groomed, well-developed  HEAD:  Atraumatic, Normocephalic  EYES: PERRLA, conjunctiva and sclera clear  ENMT: No tonsillar erythema, exudates, or enlargement; Moist mucous membranes, Good dentition, No lesions  NECK: Supple, soft.   NERVOUS SYSTEM:  Alert & Oriented X3, Good concentration; Motor Strength 4/5 b/l upper ext and L LE. R LE 3/5 strength. + strength. sensation ext and face intact, no tremor. No facial droop.   CHEST/LUNG: Clear to percussion bilaterally; No rales, rhonchi, wheezing, or rubs  HEART: Regular rate and rhythm; No murmurs, rubs, or gallops  ABDOMEN: Soft, Nontender, Nondistended; Bowel sounds present  EXTREMITIES:  2+ Peripheral Pulses, No clubbing, cyanosis, or edema    Care Discussed with Consultants/Other Providers [ X ] YES  [ ] NO Patient is a 49y old  Male who presents with a chief complaint of Right sided weakness and facial droop.      INTERVAL HPI/OVERNIGHT EVENTS: Patient examined at bedside. Patient states he is feeling well this morning. He reports his R Leg is still weak. Patient denies N/V/D, chest pain, SOB, abdominal pain. No other complaints at this time. No acute overnight events appreciated.    T(C): 36.7 (10-29-19 @ 04:47), Max: 36.9 (10-28-19 @ 08:27)  HR: 59 (10-29-19 @ 04:47) (55 - 75)  BP: 145/72 (10-29-19 @ 04:47) (122/71 - 156/86)  RR: 18 (10-29-19 @ 04:47) (18 - 18)  SpO2: 91% (10-29-19 @ 04:47) (91% - 99%)  Wt(kg): --  I&O's Summary    28 Oct 2019 07:01  -  29 Oct 2019 07:00  --------------------------------------------------------  IN: 360 mL / OUT: 2900 mL / NET: -2540 mL        LABS:                        8.9    8.38  )-----------( 408      ( 29 Oct 2019 07:13 )             29.2     10-28    144  |  110<H>  |  15  ----------------------------<  161<H>  3.5   |  28  |  0.88    Ca    8.5      28 Oct 2019 08:17          CAPILLARY BLOOD GLUCOSE      POCT Blood Glucose.: 248 mg/dL (28 Oct 2019 21:49)  POCT Blood Glucose.: 191 mg/dL (28 Oct 2019 17:03)  POCT Blood Glucose.: 142 mg/dL (28 Oct 2019 11:59)  POCT Blood Glucose.: 164 mg/dL (28 Oct 2019 08:16)            MEDICATIONS  (STANDING):  apixaban 5 milliGRAM(s) Oral every 12 hours  atorvastatin 80 milliGRAM(s) Oral at bedtime  clopidogrel Tablet 75 milliGRAM(s) Oral daily  dextrose 5%. 1000 milliLiter(s) (50 mL/Hr) IV Continuous <Continuous>  dextrose 50% Injectable 12.5 Gram(s) IV Push once  dextrose 50% Injectable 25 Gram(s) IV Push once  dextrose 50% Injectable 25 Gram(s) IV Push once  insulin glargine Injectable (LANTUS) 6 Unit(s) SubCutaneous at bedtime  insulin lispro (HumaLOG) corrective regimen sliding scale   SubCutaneous three times a day before meals  insulin lispro (HumaLOG) corrective regimen sliding scale   SubCutaneous at bedtime  insulin lispro Injectable (HumaLOG) 5 Unit(s) SubCutaneous three times a day before meals  isosorbide   mononitrate ER Tablet (IMDUR) 30 milliGRAM(s) Oral daily  losartan 25 milliGRAM(s) Oral daily  pantoprazole    Tablet 40 milliGRAM(s) Oral before breakfast  sucralfate 1 Gram(s) Oral four times a day  tamsulosin 0.4 milliGRAM(s) Oral at bedtime    MEDICATIONS  (PRN):  dextrose 40% Gel 15 Gram(s) Oral once PRN Blood Glucose LESS THAN 70 milliGRAM(s)/deciliter  glucagon  Injectable 1 milliGRAM(s) IntraMuscular once PRN Glucose LESS THAN 70 milligrams/deciliter    REVIEW OF SYSTEMS:  CONSTITUTIONAL: No fever, weight loss, or fatigue  EYES: No eye pain, visual disturbances, or discharge  ENMT:  No difficulty hearing, tinnitus, No sinus or throat pain  NECK: No pain or stiffness  RESPIRATORY: No cough, wheezing, chills or hemoptysis; No shortness of breath  CARDIOVASCULAR: No chest pain, palpitations, dizziness, or leg swelling  GASTROINTESTINAL: No abdominal or epigastric pain. No nausea, vomiting, or hematemesis; No diarrhea or constipation. No melena or hematochezia.  NEUROLOGICAL: No headaches, memory loss. Admits to R LE weakness.   MUSCULOSKELETAL: No joint pain or swelling; No muscle, back, or extremity pain. +R LE weakness.   PSYCHIATRIC: No depression, anxiety    RADIOLOGY & ADDITIONAL TESTS: No acute imaging in the last 24 hours    Imaging Personally Reviewed:  [ X ] YES  [ ] NO    Consultant(s) Notes Reviewed:  [ X ] YES  [ ] NO    Physical EXAM:  GENERAL: NAD, well-groomed, well-developed  HEAD:  Atraumatic, Normocephalic  EYES: PERRLA, conjunctiva and sclera clear  ENMT: No tonsillar erythema, exudates, or enlargement; Moist mucous membranes, Good dentition, No lesions  NECK: Supple, soft.   NERVOUS SYSTEM:  Alert & Oriented X3, Good concentration; Motor Strength 4/5 b/l upper ext and L LE. R LE 3/5 strength. + strength. sensation ext and face intact, no tremor. No facial droop.   CHEST/LUNG: Clear to percussion bilaterally; No rales, rhonchi, wheezing, or rubs  HEART: Regular rate and rhythm; No murmurs, rubs, or gallops  ABDOMEN: Soft, Nontender, Nondistended; Bowel sounds present  EXTREMITIES:  2+ Peripheral Pulses, No clubbing, cyanosis, or edema    Care Discussed with Consultants/Other Providers [ X ] YES  [ ] NO

## 2019-10-29 NOTE — PROGRESS NOTE ADULT - PROBLEM SELECTOR PLAN 1
- Patient admitted for CVA. Right sided facial drooping (improved while in ER), Right sided weakness in upper and lower extremity.  - Patient had previous CVA on 8/2019 s/p TPA in the ED (around 22:00)  - Patient did not receive TPA this admission, currently on Eliquis and clopidogrel  - CT head shows no intraluminal thrombus or dissection. Incidental right MCA bifurcation aneurysm. Mild chronic microvascular changes without evidence of an   acute transcortical infarction or hemorrhage.   - MRI neg for acute cva  - cont neuro checks q4hr for now  - lipid panel result noted  - HbA1C 7.4, improved from 8.3 on 8/1/2019  - c/w atorvastatin 80mg   - cont PT eval/ OT eval  - Dr. Jon, neuro, consulted, rec appreciated  - EEG normal study

## 2019-10-29 NOTE — PROGRESS NOTE ADULT - SUBJECTIVE AND OBJECTIVE BOX
< from: TTE Echo Doppler w/o Cont (08.15.19 @ 16:33) >     EXAM:  ECHO TTE WO CON COMP W DOPPLR       PROCEDURE DATE:  08/15/2019      INTERPRETATION:  INDICATION: Atrial fibrillation    Blood Pressure 128/64    Height 187.9 cm     Weight 91 kg       BSA 2.18   sq m    Dimensions:    LA 3.7       Normal Values: 2.0 - 4.0 cm    Ao 3.6        Normal Values: 2.0 - 3.8 cm  SEPTUM 1.6       Normal Values: 0.6 - 1.2 cm  PWT 1.4       Normal Values: 0.6 - 1.1 cm  LVIDd 5.3         Normal Values: 3.0 - 5.6 cm  LVIDs 2.7         Normal Values: 1.8 - 4.0 cm    OBSERVATIONS:  Technically difficult study  Mitral Valve: normal, mild to moderate MR.  Aortic Valve/Aorta: Aortic valve is not well-visualized, likely normal   function.  Tricuspid Valve: normal with trace TR.  Pulmonic Valve: normal  Left Atrium: Enlarged  Right Atrium: normal  Left Ventricle: normal LV size and systolic function, estimated LVEF of   65%. Left ventricular hypertrophy  Right Ventricle: Grossly normal size and systolic function.  Pericardium/Pleura: normal, no significant pericardial effusion.  LV Diastolic Function: normal    Conclusion:   Technically difficult study  Normal left ventricular internal dimensions and systolic function,   estimated LVEF of 65%.   Grossly normal RV size and systolic function.   Left atrial enlargement  Aortic valve is not well-visualized, likely normal function.   Mild to moderate MR   Trace TR.    No significant pericardial effusion.      MIROSLAVA CASTAÑEDA   This document has been electronically signed. Aug 16 2019  8:25AM     < end of copied text >    < from: Xray Chest 1 View AP/PA (10.25.19 @ 21:02) >    EXAM:  XR CHEST AP OR PA 1V                          PROCEDURE DATE:  10/25/2019      INTERPRETATION:  History: Stroke    Portable chest x-ray is performed. Comparison is made to 10/14/2019.    The heart is not enlarged. The trachea is midline. Lungs are clear. The   osseous structures demonstrate degenerative changes.    Impression: No active disease. No change    DONALDO GARCIA M.D.,ATTENDING RADIOLOGIST  This document has been electronically signed. Oct 26 2019 10:03AM     < end of copied text >     < from: 12 Lead ECG (10.25.19 @ 19:55) >    Ventricular Rate 71 BPM    Atrial Rate 71 BPM    P-R Interval 140 ms    QRS Duration 84 ms    Q-T Interval 394 ms    QTC Calculation(Bezet) 428 ms    P Axis 71 degrees    R Axis 27 degrees    T Axis 103 degrees    Diagnosis Line Sinus rhythm with premature atrial complexes  ST & T wave abnormality, consider lateral ischemia  Confirmed by JOHN KU ([id number]) on 10/26/2019 11:06:02 AM    < end of copied text >

## 2019-10-29 NOTE — PROGRESS NOTE ADULT - SUBJECTIVE AND OBJECTIVE BOX
Neurology Follow up note    SARAH [id number]    HPI:  Patient is a 48 yo male with PMHx significant of CAD s/p BMS to RCA (8/2019), RUL subsegmental PE (6/2019, on Eliquis), hx of NSTEMI and PAF s/p ex-lap omentopexy and plication for perforated antral ulcer (5/2019), osteomyelitis s/p debridement, CVA s/p tPA (8/2019), HTN and DM2 (not on insulin) presenting to the ED with right sided weakness, facial droop, and slurred speech. Patient states that around 530pm today he suddenly experienced blurred vision, right sided weakness, and right sided facial drooping. He is currently in a rehab facility. There he notified his roommate who alerted the nurse, who then called for a transport to the emergency room. Patient was not a candidate for tPA due to his comorbidities. Patient endorses SOB at the time he was experiencing his symptoms of weakness and facial drooping. He is currently being treated for a UTI with ciprofloxacin(day 2) that was diagnosed earlier this week. Of note, in Aug of this year he presented with similar symptoms that was treated with tPA. Since then, the patient states that he was able to recover all weakness except for right lower extremity. He currently receives PT/OT 5x a week in the rehab facility. Patient denies chest pain, SOB, fever, chills, vomiting and diarrhea. Patient endorses nausea and mild abdominal pain that he says is resolving since his diagnoses of UTI. He has a chronic urinary catheter in place for his chronic urinary retention.         In the ED:   Vitals: /72 HR 79 RR 19 SpO2 94 % @RA  Labs: h/h 9.9/32.6, BMP WNL except for alb 2.6. CE x1 normal   EKG Sinus rhythm with premature atrial complexes   CTA head:   1. Widely patent head and neck vasculature. No intraluminal thrombus or   dissection noted. 2. Incidental right MCA bifurcation aneurysm. Interval reassessment and   follow-up is recommended.  CT head:  Mild chronic microvascular changes without evidence of an   acute transcortical infarction or hemorrhage. (25 Oct 2019 21:35)      Interval History - no new complaints    Patient is seen, chart was reviewed and case was discussed with the treatment team.  Pt is not in any distress.   Lying on bed comfortably.   No events reported overnight.   No clinical seizure was reported.      Vital Signs Last 24 Hrs  T(C): 37.3 (29 Oct 2019 16:21), Max: 37.3 (29 Oct 2019 16:21)  T(F): 99.1 (29 Oct 2019 16:21), Max: 99.1 (29 Oct 2019 16:21)  HR: 60 (29 Oct 2019 16:21) (57 - 64)  BP: 117/73 (29 Oct 2019 16:21) (117/73 - 147/83)  BP(mean): --  RR: 18 (29 Oct 2019 16:21) (18 - 18)  SpO2: 98% (29 Oct 2019 16:21) (91% - 98%)        REVIEW OF SYSTEMS:    Constitutional: No fever, weight loss or fatigue  Eyes: No eye pain, visual disturbances, or discharge  ENT:  No difficulty hearing, tinnitus, vertigo; No sinus or throat pain  Neck: No pain or stiffness  Respiratory: No cough, wheezing, chills or hemoptysis  Cardiovascular: No chest pain, palpitations, shortness of breath, dizziness or leg swelling  Gastrointestinal: No abdominal or epigastric pain. No nausea, vomiting or hematemesis;  Genitourinary: No dysuria, frequency, hematuria or incontinence  Neurological: No headaches, memory loss, loss of strength, numbness or tremors  Psychiatric: No depression, anxiety, mood swings or difficulty sleeping  Musculoskeletal: No joint pain or swelling; No muscle, back or extremity pain  Skin: No itching, burning, rashes or lesions   Lymph Nodes: No enlarged glands  Endocrine: No heat or cold intolerance; No hair loss,  Allergy and Immunologic: No hives or eczema    On Neurological Examination:    Mental Status - Pt is alert, awake, oriented X3. Follows commands well and able to answer questions appropriately  .Mood and affect  normal    Speech -  Normal.     Cranial Nerves - Pupils 3 mm equal and reactive to light, extraocular eye movements intact.   Pt has no  facial asymmetry. Facial sensation is intact.    Muscle tone - is normal all over. Moves all extremities equally.     Motor Exam - right hemiparesis; 3/5 ; give away type of weakness.    Sensory Exam -  Pt withdraws all extremities equally on stimulation. No asymmetry seen. No complaints of tingling, numbness.        coordination:    Finger to nose: normal      Deep tendon Reflexes - 2 plus all over.       Neck Supple -  Yes.     MEDICATIONS    apixaban 5 milliGRAM(s) Oral every 12 hours  atorvastatin 80 milliGRAM(s) Oral at bedtime  clopidogrel Tablet 75 milliGRAM(s) Oral daily  dextrose 40% Gel 15 Gram(s) Oral once PRN  dextrose 5%. 1000 milliLiter(s) IV Continuous <Continuous>  dextrose 50% Injectable 12.5 Gram(s) IV Push once  dextrose 50% Injectable 25 Gram(s) IV Push once  dextrose 50% Injectable 25 Gram(s) IV Push once  glucagon  Injectable 1 milliGRAM(s) IntraMuscular once PRN  insulin glargine Injectable (LANTUS) 6 Unit(s) SubCutaneous at bedtime  insulin lispro (HumaLOG) corrective regimen sliding scale   SubCutaneous three times a day before meals  insulin lispro (HumaLOG) corrective regimen sliding scale   SubCutaneous at bedtime  insulin lispro Injectable (HumaLOG) 5 Unit(s) SubCutaneous three times a day before meals  isosorbide   mononitrate ER Tablet (IMDUR) 30 milliGRAM(s) Oral daily  pantoprazole    Tablet 40 milliGRAM(s) Oral before breakfast  sucralfate 1 Gram(s) Oral four times a day  tamsulosin 0.4 milliGRAM(s) Oral at bedtime      Allergies    fish (Hives)  No Known Drug Allergies    Intolerances                            9.2    8.27  )-----------( 364      ( 28 Oct 2019 08:17 )             29.9         10-27    144  |  107  |  16  ----------------------------<  158<H>  3.5   |  29  |  0.93    Ca    8.7      27 Oct 2019 07:19    TPro  6.8  /  Alb  2.6<L>  /  TBili  0.3  /  DBili  x   /  AST  30  /  ALT  40  /  AlkPhos  105  10-25    Hemoglobin A1C:   Lipid Panel 10-26 @ 13:26  Total Cholesterol, Serum 77  LDL 32  Triglycerides 65    Vitamin B12   < from: EEG Awake or Drowsy (10.28.19 @ 14:30) >     EXAM:  EEG-AWAKE AND DROWSY        PROCEDURE DATE:  10/28/2019        INTERPRETATION:  GENERAL DESCRIPTION: The EEG was recorded with a   32-channel digital EEG machine. The standard international 10/20   electrode placements were used.    CONDITIONS OF RECORDING: The EEG was carried out with the patient in the   awake and drowsy states. Muscle and movement artifacts are present during   the waking record    DESCRIPTION: The resting waking  background rhythms consist of moderate   voltage, symmetrical activity which is fairly well organized. The   frequency spectrum showed a moderate amount of theta and beta activity   and a negligible amount of delta activity. The posterior dominant rhythm   of 8 Hz, that is reactive to eye opening and closing was seen.    The patient drowses with slowing to irregular low voltage theta and beta   activity.    Hyperventilation was not performed for medical reasons. Intermittent   photic stimulation from 2-20 flashes per second fails to elicit any   abnormality.    There were no areas of focal slowing, epileptiform discharges or   electrographic seizures.      IMPRESSION: Normal EEG    COMMENT: A 24-48 hour EEG might be desirable to further evaluate for   possible seizure disorder, if clinically necessary. This EEG does not   exclude the clinical diagnosis of seizures or epilepsy.      CARIDAD RIOS M.D., Attending Neurologist  This document has been electronically signed. Oct 28 2019 12:58PM              RADIOLOGY    ASSESSMENT AND PLAN:      presented with right sided weakness with slurred speech;  brain mri no acute cva ;TIA vs CONVERSION DISORDER; DOUBT SEIZURE  HYPERTHYROID STATE.    NO FURTHER NEURO BARGER.  EEG REPORTED NO SEIZURE ACTIVITY.  CONTINUE AC  Physical therapy evaluation.  OOB to chair/ambulation with assistance only.  Advanced care planning was discussed with family.  Pain is accessed and addressed..  Plan of care was discussed with family. Questions answered.  Would continue to follow.

## 2019-10-29 NOTE — PROGRESS NOTE ADULT - PROBLEM SELECTOR PLAN 2
amiodarone-induced thyrotoxicosis  start prednisone 10mg daily  start methimazole 10mg daily  anticipate adjustment in insulin dosages with initiation of prednisone

## 2019-10-29 NOTE — PROGRESS NOTE ADULT - SUBJECTIVE AND OBJECTIVE BOX
CAPILLARY BLOOD GLUCOSE      POCT Blood Glucose.: 183 mg/dL (29 Oct 2019 08:04)  POCT Blood Glucose.: 248 mg/dL (28 Oct 2019 21:49)  POCT Blood Glucose.: 191 mg/dL (28 Oct 2019 17:03)  POCT Blood Glucose.: 142 mg/dL (28 Oct 2019 11:59)      Vital Signs Last 24 Hrs  T(C): 37 (29 Oct 2019 07:42), Max: 37 (29 Oct 2019 07:42)  T(F): 98.6 (29 Oct 2019 07:42), Max: 98.6 (29 Oct 2019 07:42)  HR: 58 (29 Oct 2019 07:42) (55 - 64)  BP: 128/68 (29 Oct 2019 07:42) (122/71 - 156/86)  BP(mean): --  RR: 18 (29 Oct 2019 07:42) (18 - 18)  SpO2: 94% (29 Oct 2019 07:42) (91% - 99%)    General: WN/WD NAD  Respiratory: CTA B/L  CV: RRR, S1S2, no murmurs, rubs or gallops  Abdominal: Soft, NT, ND +BS, Last BM  Extremities: No edema, + peripheral pulses    Hemoglobin A1C, Whole Blood: 7.4 % (10-27 @ 12:02)   10-29    142  |  108  |  13  ----------------------------<  180<H>  3.9   |  28  |  0.89    Ca    8.2<L>      29 Oct 2019 07:13        atorvastatin 80 milliGRAM(s) Oral at bedtime  dextrose 40% Gel 15 Gram(s) Oral once PRN  dextrose 50% Injectable 12.5 Gram(s) IV Push once  dextrose 50% Injectable 25 Gram(s) IV Push once  dextrose 50% Injectable 25 Gram(s) IV Push once  glucagon  Injectable 1 milliGRAM(s) IntraMuscular once PRN  insulin glargine Injectable (LANTUS) 6 Unit(s) SubCutaneous at bedtime  insulin lispro (HumaLOG) corrective regimen sliding scale   SubCutaneous three times a day before meals  insulin lispro (HumaLOG) corrective regimen sliding scale   SubCutaneous at bedtime  insulin lispro Injectable (HumaLOG) 5 Unit(s) SubCutaneous three times a day before meals

## 2019-10-29 NOTE — PROGRESS NOTE ADULT - PROBLEM SELECTOR PLAN 8
- Chronic, Stable  - HbA1C 7.4, improved from 8/1/19 was 8.3  - Patient does not recall what his regimen in rehab  - During his last admission, patient was well controlled on 8 units long acting lantus and 5 units humalog TID   - BG well controlled at this moment, will start on low insulin sliding scale - Chronic, Stable  - HbA1C 7.4, improved from 8/1/19 was 8.3  - Patient does not recall what his regimen in rehab  - During his last admission, patient was well controlled on 8 units long acting lantus and 5 units humalog TID   - BG well controlled at this moment, will start on moderate insulin sliding scale

## 2019-10-29 NOTE — PROGRESS NOTE ADULT - PROBLEM SELECTOR PLAN 10
- DVT prophylaxis, continue Eliquis 5mg bid and clopidogrel 75mg  - When medically optimized, pt to be d/c to rehab.   IMPROVE VTE Individual Risk Assessment        RISK                                                          Points  [  ] Previous VTE                                                3  [  ] Thrombophilia                                             2  [  ] Lower limb paralysis                                   2        (unable to hold up >15 seconds)    [  ] Current Cancer                                             2         (within 6 months)  [  ] Immobilization > 24 hrs                              1  [  ] ICU/CCU stay > 24 hours                             1  [  ] Age > 60                                                         1    IMPROVE VTE Score:         [         ]  Total Risk Factor Score:    0 - 1:   Consider IPC  >2 - 3:  Thromboprophylaxis required (enoxaparin or SQ heparin)        >4:   High Risk: Thromboprophylaxis required (enoxaparin or SQ heparin), optional add IPC  **If CONTRAINDICATION to enoxaparin or SQ heparin, USE IPCs**

## 2019-10-29 NOTE — PROGRESS NOTE ADULT - SUBJECTIVE AND OBJECTIVE BOX
[facility] -- Bolivar Carbajal, Brittany Gallegos, Reynaldo Ku [place], [place]  Office # [phone]    Follow Up:  Neuro symptoms, TIA?    Subjective/Observations:     REVIEW OF SYSTEMS: All other review of systems is negative unless indicated above  PAST MEDICAL & SURGICAL HISTORY:  Cerebrovascular accident  CAD S/P percutaneous coronary angioplasty  Osteomyelitis: s/p debridement  History of non-ST elevation myocardial infarction (NSTEMI)  Pulmonary embolism  Perforated gastric ulcer: s/p emergent ex-lap omentopexy and plication 6/2019  BPH (benign prostatic hyperplasia)  Hypertension  Afib  Diabetes mellitus with no complication  Diabetes  Traumatic amputation of left foot, initial encounter  Perforated gastric ulcer  H/O abdominal surgery    MEDICATIONS  (STANDING):  apixaban 5 milliGRAM(s) Oral every 12 hours  atorvastatin 80 milliGRAM(s) Oral at bedtime  clopidogrel Tablet 75 milliGRAM(s) Oral daily  dextrose 5%. 1000 milliLiter(s) (50 mL/Hr) IV Continuous <Continuous>  dextrose 50% Injectable 12.5 Gram(s) IV Push once  dextrose 50% Injectable 25 Gram(s) IV Push once  dextrose 50% Injectable 25 Gram(s) IV Push once  insulin glargine Injectable (LANTUS) 6 Unit(s) SubCutaneous at bedtime  insulin lispro (HumaLOG) corrective regimen sliding scale   SubCutaneous three times a day before meals  insulin lispro (HumaLOG) corrective regimen sliding scale   SubCutaneous at bedtime  insulin lispro Injectable (HumaLOG) 5 Unit(s) SubCutaneous three times a day before meals  isosorbide   mononitrate ER Tablet (IMDUR) 30 milliGRAM(s) Oral daily  losartan 25 milliGRAM(s) Oral daily  methimazole 10 milliGRAM(s) Oral daily  pantoprazole    Tablet 40 milliGRAM(s) Oral before breakfast  predniSONE   Tablet 10 milliGRAM(s) Oral daily  sucralfate 1 Gram(s) Oral four times a day  tamsulosin 0.4 milliGRAM(s) Oral at bedtime    MEDICATIONS  (PRN):  dextrose 40% Gel 15 Gram(s) Oral once PRN Blood Glucose LESS THAN 70 milliGRAM(s)/deciliter  glucagon  Injectable 1 milliGRAM(s) IntraMuscular once PRN Glucose LESS THAN 70 milligrams/deciliter    Allergies    fish (Hives)  No Known Drug Allergies    Intolerances    Vital Signs Last 24 Hrs  T(C): 37 (29 Oct 2019 07:42), Max: 37 (29 Oct 2019 07:42)  T(F): 98.6 (29 Oct 2019 07:42), Max: 98.6 (29 Oct 2019 07:42)  HR: 58 (29 Oct 2019 07:42) (55 - 64)  BP: 128/68 (29 Oct 2019 07:42) (122/71 - 156/86)  BP(mean): --  RR: 18 (29 Oct 2019 07:42) (18 - 18)  SpO2: 94% (29 Oct 2019 07:42) (91% - 99%)  I&O's Summary    28 Oct 2019 07:01  -  29 Oct 2019 07:00  --------------------------------------------------------  IN: 360 mL / OUT: 2900 mL / NET: -2540 mL    PHYSICAL EXAM:  TELE: Not on tele  Constitutional: NAD, awake and alert, well-developed  HEENT: Moist Mucous Membranes, Anicteric  Pulmonary: Non-labored, breath sounds are diminished at bases bilaterally, No wheezing, rales or rhonchi  Cardiovascular: Regular, S1 and S2, +murmurs.  No rubs, gallops or clicks  Gastrointestinal: Bowel Sounds present, soft, nontender.   Lymph: No peripheral edema. No lymphadenopathy.  Skin: No visible rashes or ulcers.  Psych:  Mood & affect appropriate    LABS: All Labs Reviewed:                        8.9    8.38  )-----------( 408      ( 29 Oct 2019 07:13 )             29.2                         9.2    8.27  )-----------( 364      ( 28 Oct 2019 08:17 )             29.9                         9.1    7.40  )-----------( 351      ( 27 Oct 2019 07:19 )             30.0     29 Oct 2019 07:13    142    |  108    |  13     ----------------------------<  180    3.9     |  28     |  0.89   28 Oct 2019 08:17    144    |  110    |  15     ----------------------------<  161    3.5     |  28     |  0.88   27 Oct 2019 07:19    144    |  107    |  16     ----------------------------<  158    3.5     |  29     |  0.93     Ca    8.2        29 Oct 2019 07:13  Ca    8.5        28 Oct 2019 08:17  Ca    8.7        27 Oct 2019 07:19  < from: TTE Echo Doppler w/o Cont (08.15.19 @ 16:33) >     EXAM:  ECHO TTE WO CON COMP W DOPPLR       PROCEDURE DATE:  08/15/2019      INTERPRETATION:  INDICATION: Atrial fibrillation    Blood Pressure 128/64    Height 187.9 cm     Weight 91 kg       BSA 2.18   sq m    Dimensions:    LA 3.7       Normal Values: 2.0 - 4.0 cm    Ao 3.6        Normal Values: 2.0 - 3.8 cm  SEPTUM 1.6       Normal Values: 0.6 - 1.2 cm  PWT 1.4       Normal Values: 0.6 - 1.1 cm  LVIDd 5.3         Normal Values: 3.0 - 5.6 cm  LVIDs 2.7         Normal Values: 1.8 - 4.0 cm    OBSERVATIONS:  Technically difficult study  Mitral Valve: normal, mild to moderate MR.  Aortic Valve/Aorta: Aortic valve is not well-visualized, likely normal   function.  Tricuspid Valve: normal with trace TR.  Pulmonic Valve: normal  Left Atrium: Enlarged  Right Atrium: normal  Left Ventricle: normal LV size and systolic function, estimated LVEF of   65%. Left ventricular hypertrophy  Right Ventricle: Grossly normal size and systolic function.  Pericardium/Pleura: normal, no significant pericardial effusion.  LV Diastolic Function: normal    Conclusion:   Technically difficult study  Normal left ventricular internal dimensions and systolic function,   estimated LVEF of 65%.   Grossly normal RV size and systolic function.   Left atrial enlargement  Aortic valve is not well-visualized, likely normal function.   Mild to moderate MR   Trace TR.    No significant pericardial effusion.      MIROSLAVA CASTAÑEDA   This document has been electronically signed. Aug 16 2019  8:25AM     < end of copied text >    < from: Xray Chest 1 View AP/PA (10.25.19 @ 21:02) >    EXAM:  XR CHEST AP OR PA 1V                          PROCEDURE DATE:  10/25/2019      INTERPRETATION:  History: Stroke    Portable chest x-ray is performed. Comparison is made to 10/14/2019.    The heart is not enlarged. The trachea is midline. Lungs are clear. The   osseous structures demonstrate degenerative changes.    Impression: No active disease. No change    DONALDO GARCIA M.D.,ATTENDING RADIOLOGIST  This document has been electronically signed. Oct 26 2019 10:03AM     < end of copied text >     < from: 12 Lead ECG (10.25.19 @ 19:55) >    Ventricular Rate 71 BPM    Atrial Rate 71 BPM    P-R Interval 140 ms    QRS Duration 84 ms    Q-T Interval 394 ms    QTC Calculation(Bezet) 428 ms    P Axis 71 degrees    R Axis 27 degrees    T Axis 103 degrees    Diagnosis Line Sinus rhythm with premature atrial complexes  ST & T wave abnormality, consider lateral ischemia  Confirmed by JOHN KU ([id number]) on 10/26/2019 11:06:02 AM    < end of copied text >    < from: EEG Awake or Drowsy (10.28.19 @ 14:30) >     EXAM:  EEG-AWAKE AND DROWSY        PROCEDURE DATE:  10/28/2019        INTERPRETATION:  GENERAL DESCRIPTION: The EEG was recorded with a   32-channel digital EEG machine. The standard international 10/20   electrode placements were used.    CONDITIONS OF RECORDING: The EEG was carried out with the patient in the   awake and drowsy states. Muscle and movement artifacts are present during   the waking record    DESCRIPTION: The resting waking  background rhythms consist of moderate   voltage, symmetrical activity which is fairly well organized. The   frequency spectrum showed a moderate amount of theta and beta activity   and a negligible amount of delta activity. The posterior dominant rhythm   of 8 Hz, that is reactive to eye opening and closing was seen.    The patient drowses with slowing to irregular low voltage theta and beta   activity.    Hyperventilation was not performed for medical reasons. Intermittent   photic stimulation from 2-20 flashes per second fails to elicit any   abnormality.    There were no areas of focal slowing, epileptiform discharges or   electrographic seizures.    IMPRESSION: Normal EEG    COMMENT: A 24-48 hour EEG might be desirable to further evaluate for   possible seizure disorder, if clinically necessary. This EEG does not   exclude the clinical diagnosis of seizures or epilepsy.    CARIDAD RIOS M.D., Attending Neurologist  This document has been electronically signed. Oct 28 2019 12:58PM     < end of copied text >

## 2019-10-29 NOTE — PROGRESS NOTE ADULT - PROBLEM SELECTOR PLAN 2
low tsh as noted- rule out amio induced hyperthyroidism  await follow up labs  -As per Dr. Oh, Endo: likely due to amiodarone, started in may 2019  tfts c/w subclinical hyperthyroidism  -thyroid and parathyroid US negative for nodules.   -?type 1 functional hyperthyroidism vs. type 2 thyroiditis  -may require both methimazole and prednisone as unable to elucidate etiology in this setting.  -f/u w/ endo for further recs. amiodarone-induced thyrotoxicosis  - start prednisone 10mg daily  - start methimazole 10mg daily  - anticipate adjustment in insulin dosages with initiation of prednisone  - thyroid and parathyroid US negative for nodules  - Endo, Dr. Perlman following, recs appreciated.

## 2019-10-29 NOTE — PROGRESS NOTE ADULT - ASSESSMENT
50 yo male with PMHx significant of CAD s/p BMS to RCA (8/2019), RUL subsegmental PE (6/2019, on Eliquis), hx of NSTEMI and PAF s/p ex-lap omentopexy and plication for perforated antral ulcer (5/2019), osteomyelitis s/p debridement, CVA s/p tPA (8/2019), HTN and DM2 (not on insulin) presenting to the ED with right sided weakness, facial droop, and slurred speech, ? amiodarone induced hyperthyroidism:    Neuro symptoms; TIA? s/p CVA  - MRI/CT head negative.  EEG negative  - No further neuro symptoms.  No evidence of significant arrhythmias on tele.  He remained in [facility] overnight  - Neuro following  - No evidence of cardiac arrhythmia while on tele, now off    Afib  - He is [facility] on tele.   Amiodarone discontinued as it may causing his TSH to be very near non-existent.  Patient does not appear thyrotoxic at this point  - Endo following.  For thyroid USD  - Can start low dose BB.    - Monitor electrolytes, replete to keep K>4 and Mag>2    CAD s/p PCI  - Continue Plavix 75 QD  - Continue high intensity statin  - Continue Imdur  - Continue Losartan    DM2  - Endo following    He is stable for discharge from cardiac standpoint  To follow while admitted    Henrietta Kim DNP, NP-C  Cardiology   Spectra #[phone]

## 2019-10-29 NOTE — PROGRESS NOTE ADULT - ASSESSMENT
50 yo male with PMHx significant of CAD s/p BMS to RCA (8/2019), RUL subsegmental PE (6/2019, on Eliquis), hx of NSTEMI and PAF s/p ex-lap omentopexy and plication for perforated antral ulcer (5/2019), osteomyelitis s/p debridement, CVA s/p tPA (8/2019), HTN and DM2 (not on insulin) presenting to the ED with right sided weakness, facial droop, and slurred speech, ? amiodarone induced hyperthyroidism:    Neuro symptoms; TIA? s/p CVA  - MRI/CT head negative.  Awaiting EEG  - No further neuro symptoms.  No evidence of significant arrhythmias on tele.  He remained in [facility] overnight  - Neuro following  - Can discontinue telemetry    Afib  - He is [facility] on tele.   Amiodarone discontinued as it may causing his TSH to be very near non-existent.  Patient does not appear thyrotoxic at this point  - Endo following.  For thyroid USD  - Cab start low dose BB.    - Monitor electrolytes, replete to keep K>4 and Mag>2    CAD s/p PCI  - Continue Plavix 75 QD  - Continue high intensity statin  - Continue Imdur  - Can resume Losartan a lower dose which may help his K    DM2  - Endo following    Other cardiac w/u pending clinical course  To follow    Henrietta Kim DNP, NP-C  Cardiology   Spectra #[phone]

## 2019-10-30 LAB
ANION GAP SERPL CALC-SCNC: 8 MMOL/L — SIGNIFICANT CHANGE UP (ref 5–17)
BUN SERPL-MCNC: 15 MG/DL — SIGNIFICANT CHANGE UP (ref 7–23)
CALCIUM SERPL-MCNC: 8.9 MG/DL — SIGNIFICANT CHANGE UP (ref 8.5–10.1)
CHLORIDE SERPL-SCNC: 108 MMOL/L — SIGNIFICANT CHANGE UP (ref 96–108)
CO2 SERPL-SCNC: 26 MMOL/L — SIGNIFICANT CHANGE UP (ref 22–31)
CREAT SERPL-MCNC: 0.94 MG/DL — SIGNIFICANT CHANGE UP (ref 0.5–1.3)
GLUCOSE SERPL-MCNC: 178 MG/DL — HIGH (ref 70–99)
HCT VFR BLD CALC: 30.8 % — LOW (ref 39–50)
HGB BLD-MCNC: 9.2 G/DL — LOW (ref 13–17)
MCHC RBC-ENTMCNC: 24.6 PG — LOW (ref 27–34)
MCHC RBC-ENTMCNC: 29.9 GM/DL — LOW (ref 32–36)
MCV RBC AUTO: 82.4 FL — SIGNIFICANT CHANGE UP (ref 80–100)
NRBC # BLD: 0 /100 WBCS — SIGNIFICANT CHANGE UP (ref 0–0)
PLATELET # BLD AUTO: 377 K/UL — SIGNIFICANT CHANGE UP (ref 150–400)
POTASSIUM SERPL-MCNC: 3.9 MMOL/L — SIGNIFICANT CHANGE UP (ref 3.5–5.3)
POTASSIUM SERPL-SCNC: 3.9 MMOL/L — SIGNIFICANT CHANGE UP (ref 3.5–5.3)
RBC # BLD: 3.74 M/UL — LOW (ref 4.2–5.8)
RBC # FLD: 18.9 % — HIGH (ref 10.3–14.5)
SODIUM SERPL-SCNC: 142 MMOL/L — SIGNIFICANT CHANGE UP (ref 135–145)
WBC # BLD: 7.99 K/UL — SIGNIFICANT CHANGE UP (ref 3.8–10.5)
WBC # FLD AUTO: 7.99 K/UL — SIGNIFICANT CHANGE UP (ref 3.8–10.5)

## 2019-10-30 PROCEDURE — 99232 SBSQ HOSP IP/OBS MODERATE 35: CPT

## 2019-10-30 RX ADMIN — INSULIN GLARGINE 6 UNIT(S): 100 INJECTION, SOLUTION SUBCUTANEOUS at 22:04

## 2019-10-30 RX ADMIN — Medication 10 MILLIGRAM(S): at 05:42

## 2019-10-30 RX ADMIN — Medication 5 UNIT(S): at 08:29

## 2019-10-30 RX ADMIN — APIXABAN 5 MILLIGRAM(S): 2.5 TABLET, FILM COATED ORAL at 05:42

## 2019-10-30 RX ADMIN — Medication 4: at 13:08

## 2019-10-30 RX ADMIN — Medication 25 MILLIGRAM(S): at 05:42

## 2019-10-30 RX ADMIN — Medication 2: at 08:29

## 2019-10-30 RX ADMIN — PANTOPRAZOLE SODIUM 40 MILLIGRAM(S): 20 TABLET, DELAYED RELEASE ORAL at 05:42

## 2019-10-30 RX ADMIN — CLOPIDOGREL BISULFATE 75 MILLIGRAM(S): 75 TABLET, FILM COATED ORAL at 13:09

## 2019-10-30 RX ADMIN — TAMSULOSIN HYDROCHLORIDE 0.4 MILLIGRAM(S): 0.4 CAPSULE ORAL at 22:04

## 2019-10-30 RX ADMIN — APIXABAN 5 MILLIGRAM(S): 2.5 TABLET, FILM COATED ORAL at 17:42

## 2019-10-30 RX ADMIN — Medication 5 UNIT(S): at 13:07

## 2019-10-30 RX ADMIN — ISOSORBIDE MONONITRATE 30 MILLIGRAM(S): 60 TABLET, EXTENDED RELEASE ORAL at 13:09

## 2019-10-30 RX ADMIN — Medication 1 GRAM(S): at 05:42

## 2019-10-30 RX ADMIN — Medication 5 UNIT(S): at 17:41

## 2019-10-30 RX ADMIN — Medication 1 GRAM(S): at 17:42

## 2019-10-30 RX ADMIN — Medication 4: at 17:41

## 2019-10-30 RX ADMIN — Medication 1 GRAM(S): at 13:10

## 2019-10-30 RX ADMIN — ATORVASTATIN CALCIUM 80 MILLIGRAM(S): 80 TABLET, FILM COATED ORAL at 22:04

## 2019-10-30 RX ADMIN — LOSARTAN POTASSIUM 25 MILLIGRAM(S): 100 TABLET, FILM COATED ORAL at 05:42

## 2019-10-30 NOTE — PROGRESS NOTE ADULT - PROBLEM SELECTOR PLAN 9
- Patient has chronic urinary retention, treated with urinary catheterization  - Continue tamsulosin 0.4mg daily

## 2019-10-30 NOTE — PROGRESS NOTE ADULT - SUBJECTIVE AND OBJECTIVE BOX
Patient is a 49y old  Male who presents with a chief complaint of Right sided weakness and facial droop (29 Oct 2019 17:39)      INTERVAL HPI/OVERNIGHT EVENTS: Patient examined at bedside. Patient states he is feeling well this morning. He reports his R Leg is still weak. Patient denies N/V/D, chest pain, SOB, abdominal pain. No other complaints at this time. No acute overnight events appreciated.    T(C): 37.4 (10-30-19 @ 07:29), Max: 37.4 (10-30-19 @ 07:29)  HR: 53 (10-30-19 @ 07:29) (53 - 64)  BP: 135/76 (10-30-19 @ 07:29) (108/66 - 147/83)  RR: 18 (10-30-19 @ 07:29) (17 - 18)  SpO2: 92% (10-30-19 @ 07:29) (92% - 98%)  Wt(kg): --  I&O's Summary    29 Oct 2019 07:01  -  30 Oct 2019 07:00  --------------------------------------------------------  IN: 710 mL / OUT: 5000 mL / NET: -4290 mL        LABS:                        9.2    7.99  )-----------( 377      ( 30 Oct 2019 07:33 )             30.8     10-30    142  |  108  |  15  ----------------------------<  178<H>  3.9   |  26  |  0.94    Ca    8.9      30 Oct 2019 07:33          CAPILLARY BLOOD GLUCOSE      POCT Blood Glucose.: 187 mg/dL (30 Oct 2019 08:08)  POCT Blood Glucose.: 248 mg/dL (29 Oct 2019 21:14)  POCT Blood Glucose.: 167 mg/dL (29 Oct 2019 17:14)  POCT Blood Glucose.: 158 mg/dL (29 Oct 2019 12:04)            MEDICATIONS  (STANDING):  apixaban 5 milliGRAM(s) Oral every 12 hours  atorvastatin 80 milliGRAM(s) Oral at bedtime  clopidogrel Tablet 75 milliGRAM(s) Oral daily  dextrose 5%. 1000 milliLiter(s) (50 mL/Hr) IV Continuous <Continuous>  dextrose 50% Injectable 12.5 Gram(s) IV Push once  dextrose 50% Injectable 25 Gram(s) IV Push once  dextrose 50% Injectable 25 Gram(s) IV Push once  insulin glargine Injectable (LANTUS) 6 Unit(s) SubCutaneous at bedtime  insulin lispro (HumaLOG) corrective regimen sliding scale   SubCutaneous three times a day before meals  insulin lispro (HumaLOG) corrective regimen sliding scale   SubCutaneous at bedtime  insulin lispro Injectable (HumaLOG) 5 Unit(s) SubCutaneous three times a day before meals  isosorbide   mononitrate ER Tablet (IMDUR) 30 milliGRAM(s) Oral daily  losartan 25 milliGRAM(s) Oral daily  methimazole 10 milliGRAM(s) Oral daily  metoprolol tartrate 25 milliGRAM(s) Oral two times a day  pantoprazole    Tablet 40 milliGRAM(s) Oral before breakfast  predniSONE   Tablet 10 milliGRAM(s) Oral daily  sucralfate 1 Gram(s) Oral four times a day  tamsulosin 0.4 milliGRAM(s) Oral at bedtime    MEDICATIONS  (PRN):  dextrose 40% Gel 15 Gram(s) Oral once PRN Blood Glucose LESS THAN 70 milliGRAM(s)/deciliter  glucagon  Injectable 1 milliGRAM(s) IntraMuscular once PRN Glucose LESS THAN 70 milligrams/deciliter      REVIEW OF SYSTEMS:  CONSTITUTIONAL: No fever, weight loss, or fatigue  EYES: No eye pain, visual disturbances, or discharge  ENMT:  No difficulty hearing, tinnitus, No sinus or throat pain  NECK: No pain or stiffness  RESPIRATORY: No cough, wheezing, chills or hemoptysis; No shortness of breath  CARDIOVASCULAR: No chest pain, palpitations, dizziness, or leg swelling  GASTROINTESTINAL: No abdominal or epigastric pain. No nausea, vomiting, or hematemesis; No diarrhea or constipation. No melena or hematochezia.  NEUROLOGICAL: No headaches, memory loss. Admits to R LE weakness.   MUSCULOSKELETAL: No joint pain or swelling; No muscle, back, or extremity pain. +R LE weakness.   PSYCHIATRIC: No depression, anxiety    RADIOLOGY & ADDITIONAL TESTS: No acute imaging in the last 24 hours    Imaging Personally Reviewed:  [ X ] YES  [ ] NO    Consultant(s) Notes Reviewed:  [ X ] YES  [ ] NO    Physical EXAM:  GENERAL: NAD, well-groomed, well-developed  HEAD:  Atraumatic, Normocephalic  EYES: PERRLA, conjunctiva and sclera clear  ENMT: No tonsillar erythema, exudates, or enlargement; Moist mucous membranes, Good dentition, No lesions  NECK: Supple, soft.   NERVOUS SYSTEM:  Alert & Oriented X3, Good concentration; Motor Strength 4/5 b/l upper ext and L LE. R LE 3/5 strength. + strength. sensation ext and face intact, no tremor. No facial droop.   CHEST/LUNG: Clear to percussion bilaterally; No rales, rhonchi, wheezing, or rubs  HEART: Regular rate and rhythm; No murmurs, rubs, or gallops  ABDOMEN: Soft, Nontender, Nondistended; Bowel sounds present  EXTREMITIES:  2+ Peripheral Pulses, No clubbing, cyanosis, or edema    Care Discussed with Consultants/Other Providers [ X ] YES  [ ] NO

## 2019-10-30 NOTE — PROGRESS NOTE ADULT - SUBJECTIVE AND OBJECTIVE BOX
CAPILLARY BLOOD GLUCOSE      POCT Blood Glucose.: 216 mg/dL (30 Oct 2019 11:54)  POCT Blood Glucose.: 187 mg/dL (30 Oct 2019 08:08)  POCT Blood Glucose.: 248 mg/dL (29 Oct 2019 21:14)  POCT Blood Glucose.: 167 mg/dL (29 Oct 2019 17:14)      Vital Signs Last 24 Hrs  T(C): 37.4 (30 Oct 2019 07:29), Max: 37.4 (30 Oct 2019 07:29)  T(F): 99.3 (30 Oct 2019 07:29), Max: 99.3 (30 Oct 2019 07:29)  HR: 53 (30 Oct 2019 07:29) (53 - 64)  BP: 137/85 (30 Oct 2019 10:20) (108/66 - 144/75)  BP(mean): --  RR: 18 (30 Oct 2019 07:29) (17 - 18)  SpO2: 98% (30 Oct 2019 10:20) (92% - 98%)    General: WN/WD NAD  Respiratory: CTA B/L  CV: RRR, S1S2, no murmurs, rubs or gallops  Abdominal: Soft, NT, ND +BS, Last BM  Extremities: No edema, + peripheral pulses     10-30    142  |  108  |  15  ----------------------------<  178<H>  3.9   |  26  |  0.94    Ca    8.9      30 Oct 2019 07:33        atorvastatin 80 milliGRAM(s) Oral at bedtime  dextrose 40% Gel 15 Gram(s) Oral once PRN  dextrose 50% Injectable 12.5 Gram(s) IV Push once  dextrose 50% Injectable 25 Gram(s) IV Push once  dextrose 50% Injectable 25 Gram(s) IV Push once  glucagon  Injectable 1 milliGRAM(s) IntraMuscular once PRN  insulin glargine Injectable (LANTUS) 6 Unit(s) SubCutaneous at bedtime  insulin lispro (HumaLOG) corrective regimen sliding scale   SubCutaneous three times a day before meals  insulin lispro (HumaLOG) corrective regimen sliding scale   SubCutaneous at bedtime  insulin lispro Injectable (HumaLOG) 5 Unit(s) SubCutaneous three times a day before meals  methimazole 10 milliGRAM(s) Oral daily  predniSONE   Tablet 10 milliGRAM(s) Oral daily

## 2019-10-30 NOTE — PROGRESS NOTE ADULT - SUBJECTIVE AND OBJECTIVE BOX
Neurology Follow up note    SARAH [id number]    HPI:  Patient is a 50 yo male with PMHx significant of CAD s/p BMS to RCA (8/2019), RUL subsegmental PE (6/2019, on Eliquis), hx of NSTEMI and PAF s/p ex-lap omentopexy and plication for perforated antral ulcer (5/2019), osteomyelitis s/p debridement, CVA s/p tPA (8/2019), HTN and DM2 (not on insulin) presenting to the ED with right sided weakness, facial droop, and slurred speech. Patient states that around 530pm today he suddenly experienced blurred vision, right sided weakness, and right sided facial drooping. He is currently in a rehab facility. There he notified his roommate who alerted the nurse, who then called for a transport to the emergency room. Patient was not a candidate for tPA due to his comorbidities. Patient endorses SOB at the time he was experiencing his symptoms of weakness and facial drooping. He is currently being treated for a UTI with ciprofloxacin(day 2) that was diagnosed earlier this week. Of note, in Aug of this year he presented with similar symptoms that was treated with tPA. Since then, the patient states that he was able to recover all weakness except for right lower extremity. He currently receives PT/OT 5x a week in the rehab facility. Patient denies chest pain, SOB, fever, chills, vomiting and diarrhea. Patient endorses nausea and mild abdominal pain that he says is resolving since his diagnoses of UTI. He has a chronic urinary catheter in place for his chronic urinary retention.         In the ED:   Vitals: /72 HR 79 RR 19 SpO2 94 % @RA  Labs: h/h 9.9/32.6, BMP WNL except for alb 2.6. CE x1 normal   EKG Sinus rhythm with premature atrial complexes   CTA head:   1. Widely patent head and neck vasculature. No intraluminal thrombus or   dissection noted. 2. Incidental right MCA bifurcation aneurysm. Interval reassessment and   follow-up is recommended.  CT head:  Mild chronic microvascular changes without evidence of an   acute transcortical infarction or hemorrhage. (25 Oct 2019 21:35)      Interval History - I am feeling better    Patient is seen, chart was reviewed and case was discussed with the treatment team.  Pt is not in any distress.   Lying on bed comfortably.   No events reported overnight.   No clinical seizure was reported.      Vital Signs Last 24 Hrs  T(C): 36.6 (30 Oct 2019 13:14), Max: 37.4 (30 Oct 2019 07:29)  T(F): 97.9 (30 Oct 2019 13:14), Max: 99.3 (30 Oct 2019 07:29)  HR: 59 (30 Oct 2019 13:14) (53 - 64)  BP: 138/79 (30 Oct 2019 13:14) (108/66 - 144/75)  BP(mean): --  RR: 16 (30 Oct 2019 13:14) (16 - 18)  SpO2: 97% (30 Oct 2019 13:14) (92% - 98%))        REVIEW OF SYSTEMS:    Constitutional: No fever, weight loss or fatigue  Eyes: No eye pain, visual disturbances, or discharge  ENT:  No difficulty hearing, tinnitus, vertigo; No sinus or throat pain  Neck: No pain or stiffness  Respiratory: No cough, wheezing, chills or hemoptysis  Cardiovascular: No chest pain, palpitations, shortness of breath, dizziness or leg swelling  Gastrointestinal: No abdominal or epigastric pain. No nausea, vomiting or hematemesis;  Genitourinary: No dysuria, frequency, hematuria or incontinence  Neurological: No headaches, memory loss, loss of strength, numbness or tremors  Psychiatric: No depression, anxiety, mood swings or difficulty sleeping  Musculoskeletal: No joint pain or swelling; No muscle, back or extremity pain  Skin: No itching, burning, rashes or lesions   Lymph Nodes: No enlarged glands  Endocrine: No heat or cold intolerance; No hair loss,  Allergy and Immunologic: No hives or eczema    On Neurological Examination:    Mental Status - Pt is alert, awake, oriented X3. Follows commands well and able to answer questions appropriately  .Mood and affect  normal    Speech -  Normal.     Cranial Nerves - Pupils 3 mm equal and reactive to light, extraocular eye movements intact.   Pt has no  facial asymmetry. Facial sensation is intact.    Muscle tone - is normal all over. Moves all extremities equally.     Motor Exam - right hemiparesis; 4/5 ; give away type of weakness.    Sensory Exam -  Pt withdraws all extremities equally on stimulation. No asymmetry seen. No complaints of tingling, numbness.        coordination:    Finger to nose: normal      Deep tendon Reflexes - 2 plus all over.       Neck Supple -  Yes.     MEDICATIONS    apixaban 5 milliGRAM(s) Oral every 12 hours  atorvastatin 80 milliGRAM(s) Oral at bedtime  clopidogrel Tablet 75 milliGRAM(s) Oral daily  dextrose 40% Gel 15 Gram(s) Oral once PRN  dextrose 5%. 1000 milliLiter(s) IV Continuous <Continuous>  dextrose 50% Injectable 12.5 Gram(s) IV Push once  dextrose 50% Injectable 25 Gram(s) IV Push once  dextrose 50% Injectable 25 Gram(s) IV Push once  glucagon  Injectable 1 milliGRAM(s) IntraMuscular once PRN  insulin glargine Injectable (LANTUS) 6 Unit(s) SubCutaneous at bedtime  insulin lispro (HumaLOG) corrective regimen sliding scale   SubCutaneous three times a day before meals  insulin lispro (HumaLOG) corrective regimen sliding scale   SubCutaneous at bedtime  insulin lispro Injectable (HumaLOG) 5 Unit(s) SubCutaneous three times a day before meals  isosorbide   mononitrate ER Tablet (IMDUR) 30 milliGRAM(s) Oral daily  pantoprazole    Tablet 40 milliGRAM(s) Oral before breakfast  sucralfate 1 Gram(s) Oral four times a day  tamsulosin 0.4 milliGRAM(s) Oral at bedtime      Allergies    fish (Hives)  No Known Drug Allergies    Intolerances                                          9.2    7.99  )-----------( 377      ( 30 Oct 2019 07:33 )             30.8       Hemoglobin A1C:   Lipid Panel 10-26 @ 13:26  Total Cholesterol, Serum 77  LDL 32  Triglycerides 65    Vitamin B12   < from: EEG Awake or Drowsy (10.28.19 @ 14:30) >     EXAM:  EEG-AWAKE AND DROWSY        PROCEDURE DATE:  10/28/2019        INTERPRETATION:  GENERAL DESCRIPTION: The EEG was recorded with a   32-channel digital EEG machine. The standard international 10/20   electrode placements were used.    CONDITIONS OF RECORDING: The EEG was carried out with the patient in the   awake and drowsy states. Muscle and movement artifacts are present during   the waking record    DESCRIPTION: The resting waking  background rhythms consist of moderate   voltage, symmetrical activity which is fairly well organized. The   frequency spectrum showed a moderate amount of theta and beta activity   and a negligible amount of delta activity. The posterior dominant rhythm   of 8 Hz, that is reactive to eye opening and closing was seen.    The patient drowses with slowing to irregular low voltage theta and beta   activity.    Hyperventilation was not performed for medical reasons. Intermittent   photic stimulation from 2-20 flashes per second fails to elicit any   abnormality.    There were no areas of focal slowing, epileptiform discharges or   electrographic seizures.      IMPRESSION: Normal EEG    COMMENT: A 24-48 hour EEG might be desirable to further evaluate for   possible seizure disorder, if clinically necessary. This EEG does not   exclude the clinical diagnosis of seizures or epilepsy.      CARIDAD RIOS M.D., Attending Neurologist  This document has been electronically signed. Oct 28 2019 12:58PM              RADIOLOGY    ASSESSMENT AND PLAN:      presented with right sided weakness with slurred speech;  brain mri no acute cva ;TIA vs CONVERSION DISORDER; DOUBT SEIZURE  HYPERTHYROID STATE.    NEURO COTTER STABLE FOR ELIAS  NO FURTHER NEURO BARGER.  EEG REPORTED NO SEIZURE ACTIVITY.  CONTINUE AC  Physical therapy evaluation.  OOB to chair/ambulation with assistance only.  Advanced care planning was discussed with family.  Pain is accessed and addressed..  Plan of care was discussed with family. Questions answered.  Would continue to follow.

## 2019-10-30 NOTE — PHARMACOTHERAPY INTERVENTION NOTE - COMMENTS
Patient from facility but wanted to review his current and historical meds.  Reviewed all meds, patient satisfied no omissions or side effects or untreated conditions that he is concerned about at this time.

## 2019-10-30 NOTE — PROGRESS NOTE ADULT - PROBLEM SELECTOR PLAN 8
- Chronic, Stable  - HbA1C 7.4, improved from 8/1/19 was 8.3  - Patient does not recall what his regimen in rehab  - During his last admission, patient was well controlled on 8 units long acting lantus and 5 units humalog TID   - BG well controlled at this moment, will start on moderate insulin sliding scale

## 2019-10-30 NOTE — PROGRESS NOTE ADULT - PROBLEM SELECTOR PLAN 3
- Patient had bare metal stent placed on 7/31/19  - Patient currently on Eliquis 5 bid and clopidogrel 75mg (patient states he received morning dose of both medication during date of admission but unaware if he received second dose) (facility contacted at 12:55am, confirmed received all meds yesterday before transfer)   - Continue eliquis and plavix  - Continue home medication of atorvastatin - Patient had bare metal stent placed on 7/31/19  - Patient currently on Eliquis 5 bid and clopidogrel 75mg   - Continue eliquis and plavix  - Continue home medication of atorvastatin

## 2019-10-30 NOTE — PROGRESS NOTE ADULT - PROBLEM SELECTOR PLAN 10
- DVT prophylaxis, continue Eliquis 5mg bid and clopidogrel 75mg  - When medically optimized, pt to be d/c to subacute rehab.   IMPROVE VTE Individual Risk Assessment        RISK                                                          Points  [  ] Previous VTE                                                3  [  ] Thrombophilia                                             2  [  ] Lower limb paralysis                                   2        (unable to hold up >15 seconds)    [  ] Current Cancer                                             2         (within 6 months)  [  ] Immobilization > 24 hrs                              1  [  ] ICU/CCU stay > 24 hours                             1  [  ] Age > 60                                                         1    IMPROVE VTE Score:         [         ]  Total Risk Factor Score:    0 - 1:   Consider IPC  >2 - 3:  Thromboprophylaxis required (enoxaparin or SQ heparin)        >4:   High Risk: Thromboprophylaxis required (enoxaparin or SQ heparin), optional add IPC  **If CONTRAINDICATION to enoxaparin or SQ heparin, USE IPCs** - DVT prophylaxis, continue Eliquis 5mg bid and clopidogrel 75mg  IMPROVE VTE Individual Risk Assessment        RISK                                                          Points  [  ] Previous VTE                                                3  [  ] Thrombophilia                                             2  [  ] Lower limb paralysis                                   2        (unable to hold up >15 seconds)    [  ] Current Cancer                                             2         (within 6 months)  [  ] Immobilization > 24 hrs                              1  [  ] ICU/CCU stay > 24 hours                             1  [  ] Age > 60                                                         1    IMPROVE VTE Score:         [         ]  Total Risk Factor Score:    0 - 1:   Consider IPC  >2 - 3:  Thromboprophylaxis required (enoxaparin or SQ heparin)        >4:   High Risk: Thromboprophylaxis required (enoxaparin or SQ heparin), optional add IPC  **If CONTRAINDICATION to enoxaparin or SQ heparin, USE IPCs**

## 2019-10-30 NOTE — PROGRESS NOTE ADULT - ASSESSMENT
50 yo male with PMHx significant of CAD s/p BMS to RCA (8/2019), RUL subsegmental PE (6/2019, on Eliquis), hx of NSTEMI and PAF s/p ex-lap omentopexy and plication for perforated antral ulcer (5/2019), osteomyelitis s/p debridement, CVA s/p tPA (8/2019), HTN and DM2 (not on insulin) presenting to the ED with right sided weakness, facial droop, and slurred speech, ? amiodarone induced hyperthyroidism:    Neuro symptoms; TIA? s/p CVA  - MRI/CT head negative.  EEG negative  - No further neuro symptoms.  No evidence of significant arrhythmias on tele.  He remained in [facility] overnight  - Neuro following  - No evidence of cardiac arrhythmia while on tele, now off    Afib  -   Amiodarone discontinued as it may causing his low TSH   -Beta blocker started continue with lopressor BID   Thromboembolism ppx on eliquis BID   - Monitor electrolytes, replete to keep K>4 and Mag>2    CAD s/p PCI  - Continue Plavix 75 QD  - Continue  statin  - Continue Imdur  - Continue Losartan    DM2  - Endo following    He is stable for discharge from cardiac standpoint     Yissel Alvarez FNP-C  Cardiology NP  [id number] [phone] 50 yo male with PMHx significant of CAD s/p BMS to RCA (8/2019), RUL subsegmental PE (6/2019, on Eliquis), hx of NSTEMI and PAF s/p ex-lap omentopexy and plication for perforated antral ulcer (5/2019), osteomyelitis s/p debridement, CVA s/p tPA (8/2019), HTN and DM2 (not on insulin) presenting to the ED with right sided weakness, facial droop, and slurred speech, ? amiodarone induced hyperthyroidism:    Neuro symptoms; TIA? s/p CVA  - MRI/CT head negative.  EEG negative  - No further neuro symptoms.  No evidence of significant arrhythmias on tele.  He remained in [facility] overnight  - Neuro following  - No evidence of cardiac arrhythmia while on tele, now off    Afib  - Amiodarone discontinued as it may causing his low TSH   - Beta blocker started continue with lopressor BID   - Thromboembolism ppx on eliquis BID   - Monitor electrolytes, replete to keep K>4 and Mag>2    CAD s/p PCI  - Continue Plavix 75 QD  - Continue statin  - Continue Imdur  - Continue Losartan    DM2  - Endo following    He is stable for discharge from cardiac standpoint     Yissel Alvarez FNP-C  Cardiology NP  [phone]

## 2019-10-30 NOTE — PROGRESS NOTE ADULT - SUBJECTIVE AND OBJECTIVE BOX
[facility] -- Bolivar Carbajal, Brittany Gallegos, Reynaldo Sweeney Savella  Office # [phone]      Follow Up:    CAD    Subjective/Observations:     No events overnight resting comfortably in bed.  No complaints of chest pain, dyspnea, or palpitations reported. No signs of orthopnea or PND.    REVIEW OF SYSTEMS: All other review of systems is negative unless indicated above    PAST MEDICAL & SURGICAL HISTORY:  Cerebrovascular accident  CAD S/P percutaneous coronary angioplasty  Osteomyelitis: s/p debridement  History of non-ST elevation myocardial infarction (NSTEMI)  Pulmonary embolism  Perforated gastric ulcer: s/p emergent ex-lap omentopexy and plication 6/2019  BPH (benign prostatic hyperplasia)  Hypertension  Afib  Diabetes mellitus with no complication  Diabetes  Traumatic amputation of left foot, initial encounter  Perforated gastric ulcer  H/O abdominal surgery      MEDICATIONS  (STANDING):  apixaban 5 milliGRAM(s) Oral every 12 hours  atorvastatin 80 milliGRAM(s) Oral at bedtime  clopidogrel Tablet 75 milliGRAM(s) Oral daily  dextrose 5%. 1000 milliLiter(s) (50 mL/Hr) IV Continuous <Continuous>  dextrose 50% Injectable 12.5 Gram(s) IV Push once  dextrose 50% Injectable 25 Gram(s) IV Push once  dextrose 50% Injectable 25 Gram(s) IV Push once  insulin glargine Injectable (LANTUS) 6 Unit(s) SubCutaneous at bedtime  insulin lispro (HumaLOG) corrective regimen sliding scale   SubCutaneous three times a day before meals  insulin lispro (HumaLOG) corrective regimen sliding scale   SubCutaneous at bedtime  insulin lispro Injectable (HumaLOG) 5 Unit(s) SubCutaneous three times a day before meals  isosorbide   mononitrate ER Tablet (IMDUR) 30 milliGRAM(s) Oral daily  losartan 25 milliGRAM(s) Oral daily  methimazole 10 milliGRAM(s) Oral daily  metoprolol tartrate 25 milliGRAM(s) Oral two times a day  pantoprazole    Tablet 40 milliGRAM(s) Oral before breakfast  predniSONE   Tablet 10 milliGRAM(s) Oral daily  sucralfate 1 Gram(s) Oral four times a day  tamsulosin 0.4 milliGRAM(s) Oral at bedtime    MEDICATIONS  (PRN):  dextrose 40% Gel 15 Gram(s) Oral once PRN Blood Glucose LESS THAN 70 milliGRAM(s)/deciliter  glucagon  Injectable 1 milliGRAM(s) IntraMuscular once PRN Glucose LESS THAN 70 milligrams/deciliter      Allergies    fish (Hives)  No Known Drug Allergies    Intolerances        Vital Signs Last 24 Hrs  T(C): 37.4 (30 Oct 2019 07:29), Max: 37.4 (30 Oct 2019 07:29)  T(F): 99.3 (30 Oct 2019 07:29), Max: 99.3 (30 Oct 2019 07:29)  HR: 53 (30 Oct 2019 07:29) (53 - 64)  BP: 135/76 (30 Oct 2019 07:29) (108/66 - 147/83)  BP(mean): --  RR: 18 (30 Oct 2019 07:29) (17 - 18)  SpO2: 92% (30 Oct 2019 07:29) (92% - 98%)    I&O's Summary    29 Oct 2019 07:01  -  30 Oct 2019 07:00  --------------------------------------------------------  IN: 710 mL / OUT: 5000 mL / NET: -4290 mL          PHYSICAL EXAM:  TELE: not on tele   Constitutional: NAD, awake and alert, well-developed  HEENT: Moist Mucous Membranes, Anicteric  Pulmonary: Non-labored, breath sounds are clear bilaterally, No wheezing, crackles or rhonchi  Cardiovascular: Regular, S1 and S2 nl,+ murmur   Gastrointestinal: Bowel Sounds present, soft, nontender.   Lymph: No lymphadenopathy. No peripheral edema.  Skin: No visible rashes or ulcers.  Psych:  Mood & affect appropriate    LABS: All Labs Reviewed:                        9.2    7.99  )-----------( 377      ( 30 Oct 2019 07:33 )             30.8                         8.9    8.38  )-----------( 408      ( 29 Oct 2019 07:13 )             29.2                         9.2    8.27  )-----------( 364      ( 28 Oct 2019 08:17 )             29.9     30 Oct 2019 07:33    142    |  108    |  15     ----------------------------<  178    3.9     |  26     |  0.94   29 Oct 2019 07:13    142    |  108    |  13     ----------------------------<  180    3.9     |  28     |  0.89   28 Oct 2019 08:17    144    |  110    |  15     ----------------------------<  161    3.5     |  28     |  0.88     Ca    8.9        30 Oct 2019 07:33  Ca    8.2        29 Oct 2019 07:13  Ca    8.5        28 Oct 2019 08:17               ECG:  < from: 12 Lead ECG (10.25.19 @ 19:55) >    Ventricular Rate 71 BPM    Atrial Rate 71 BPM    P-R Interval 140 ms    QRS Duration 84 ms    Q-T Interval 394 ms    QTC Calculation(Bezet) 428 ms    P Axis 71 degrees    R Axis 27 degrees    T Axis 103 degrees    Diagnosis Line Sinus rhythm with premature atrial complexes  ST & T wave abnormality, consider lateral ischemia    < end of copied text >        Echo:  < from: TTE Echo Doppler w/o Cont (08.15.19 @ 16:33) >    Conclusion:   Technically difficult study  Normal left ventricular internal dimensions and systolic function,   estimated LVEF of 65%.   Grossly normal RV size and systolic function.   Left atrial enlargement  Aortic valve is not well-visualized, likely normal function.   Mild to moderate MR   Trace TR.    No significant pericardial effusion.    < end of copied text >                      Cardiology

## 2019-10-30 NOTE — PROGRESS NOTE ADULT - PROBLEM SELECTOR PLAN 2
amiodarone-induced thyrotoxicosis  - start prednisone 10mg daily  - start methimazole 10mg daily  - anticipate adjustment in insulin dosages with initiation of prednisone  - thyroid and parathyroid US negative for nodules  - Endo, Dr. Perlman following, recs appreciated.

## 2019-10-30 NOTE — PROGRESS NOTE ADULT - PROBLEM SELECTOR PLAN 1
increase lantus 10 units qhs  increase humalog 7 units 3x/day before meals  cont mod dose humalog scale coverage qac/qhs  cont cons cho diet  goal bg 100-180 in hosp setting

## 2019-10-30 NOTE — PROGRESS NOTE ADULT - PROBLEM SELECTOR PLAN 2
amiodarone-induced thyrotoxicosis - ?type 1 vs type 2  cont prednisone 10mg daily  cont methimazole 10mg daily  cont to follow tfts  f/u in outpt setting advised

## 2019-10-31 ENCOUNTER — TRANSCRIPTION ENCOUNTER (OUTPATIENT)
Age: 49
End: 2019-10-31

## 2019-10-31 VITALS — HEART RATE: 69 BPM | SYSTOLIC BLOOD PRESSURE: 119 MMHG | DIASTOLIC BLOOD PRESSURE: 72 MMHG

## 2019-10-31 LAB
ANION GAP SERPL CALC-SCNC: 5 MMOL/L — SIGNIFICANT CHANGE UP (ref 5–17)
BUN SERPL-MCNC: 15 MG/DL — SIGNIFICANT CHANGE UP (ref 7–23)
CALCIUM SERPL-MCNC: 8.9 MG/DL — SIGNIFICANT CHANGE UP (ref 8.5–10.1)
CHLORIDE SERPL-SCNC: 108 MMOL/L — SIGNIFICANT CHANGE UP (ref 96–108)
CO2 SERPL-SCNC: 28 MMOL/L — SIGNIFICANT CHANGE UP (ref 22–31)
CREAT SERPL-MCNC: 1 MG/DL — SIGNIFICANT CHANGE UP (ref 0.5–1.3)
GLUCOSE SERPL-MCNC: 206 MG/DL — HIGH (ref 70–99)
HCT VFR BLD CALC: 32.1 % — LOW (ref 39–50)
HGB BLD-MCNC: 9.6 G/DL — LOW (ref 13–17)
MCHC RBC-ENTMCNC: 24.7 PG — LOW (ref 27–34)
MCHC RBC-ENTMCNC: 29.9 GM/DL — LOW (ref 32–36)
MCV RBC AUTO: 82.7 FL — SIGNIFICANT CHANGE UP (ref 80–100)
NRBC # BLD: 0 /100 WBCS — SIGNIFICANT CHANGE UP (ref 0–0)
PLATELET # BLD AUTO: 390 K/UL — SIGNIFICANT CHANGE UP (ref 150–400)
POTASSIUM SERPL-MCNC: 4.3 MMOL/L — SIGNIFICANT CHANGE UP (ref 3.5–5.3)
POTASSIUM SERPL-SCNC: 4.3 MMOL/L — SIGNIFICANT CHANGE UP (ref 3.5–5.3)
RBC # BLD: 3.88 M/UL — LOW (ref 4.2–5.8)
RBC # FLD: 19 % — HIGH (ref 10.3–14.5)
SODIUM SERPL-SCNC: 141 MMOL/L — SIGNIFICANT CHANGE UP (ref 135–145)
WBC # BLD: 6.89 K/UL — SIGNIFICANT CHANGE UP (ref 3.8–10.5)
WBC # FLD AUTO: 6.89 K/UL — SIGNIFICANT CHANGE UP (ref 3.8–10.5)

## 2019-10-31 PROCEDURE — 85730 THROMBOPLASTIN TIME PARTIAL: CPT

## 2019-10-31 PROCEDURE — 80053 COMPREHEN METABOLIC PANEL: CPT

## 2019-10-31 PROCEDURE — 82550 ASSAY OF CK (CPK): CPT

## 2019-10-31 PROCEDURE — 86376 MICROSOMAL ANTIBODY EACH: CPT

## 2019-10-31 PROCEDURE — 97530 THERAPEUTIC ACTIVITIES: CPT

## 2019-10-31 PROCEDURE — 93005 ELECTROCARDIOGRAM TRACING: CPT

## 2019-10-31 PROCEDURE — 80061 LIPID PANEL: CPT

## 2019-10-31 PROCEDURE — 70498 CT ANGIOGRAPHY NECK: CPT

## 2019-10-31 PROCEDURE — 71046 X-RAY EXAM CHEST 2 VIEWS: CPT

## 2019-10-31 PROCEDURE — 96376 TX/PRO/DX INJ SAME DRUG ADON: CPT

## 2019-10-31 PROCEDURE — 84484 ASSAY OF TROPONIN QUANT: CPT

## 2019-10-31 PROCEDURE — 36415 COLL VENOUS BLD VENIPUNCTURE: CPT

## 2019-10-31 PROCEDURE — 85652 RBC SED RATE AUTOMATED: CPT

## 2019-10-31 PROCEDURE — 70551 MRI BRAIN STEM W/O DYE: CPT

## 2019-10-31 PROCEDURE — 80307 DRUG TEST PRSMV CHEM ANLYZR: CPT

## 2019-10-31 PROCEDURE — 85027 COMPLETE CBC AUTOMATED: CPT

## 2019-10-31 PROCEDURE — 70496 CT ANGIOGRAPHY HEAD: CPT

## 2019-10-31 PROCEDURE — 83880 ASSAY OF NATRIURETIC PEPTIDE: CPT

## 2019-10-31 PROCEDURE — 85379 FIBRIN DEGRADATION QUANT: CPT

## 2019-10-31 PROCEDURE — 85610 PROTHROMBIN TIME: CPT

## 2019-10-31 PROCEDURE — 84436 ASSAY OF TOTAL THYROXINE: CPT

## 2019-10-31 PROCEDURE — 80048 BASIC METABOLIC PNL TOTAL CA: CPT

## 2019-10-31 PROCEDURE — 84480 ASSAY TRIIODOTHYRONINE (T3): CPT

## 2019-10-31 PROCEDURE — 97116 GAIT TRAINING THERAPY: CPT

## 2019-10-31 PROCEDURE — 97165 OT EVAL LOW COMPLEX 30 MIN: CPT

## 2019-10-31 PROCEDURE — 76536 US EXAM OF HEAD AND NECK: CPT

## 2019-10-31 PROCEDURE — 83036 HEMOGLOBIN GLYCOSYLATED A1C: CPT

## 2019-10-31 PROCEDURE — 87040 BLOOD CULTURE FOR BACTERIA: CPT

## 2019-10-31 PROCEDURE — 82962 GLUCOSE BLOOD TEST: CPT

## 2019-10-31 PROCEDURE — 97162 PT EVAL MOD COMPLEX 30 MIN: CPT

## 2019-10-31 PROCEDURE — 83735 ASSAY OF MAGNESIUM: CPT

## 2019-10-31 PROCEDURE — 86901 BLOOD TYPING SEROLOGIC RH(D): CPT

## 2019-10-31 PROCEDURE — 71045 X-RAY EXAM CHEST 1 VIEW: CPT

## 2019-10-31 PROCEDURE — 84100 ASSAY OF PHOSPHORUS: CPT

## 2019-10-31 PROCEDURE — 96374 THER/PROPH/DIAG INJ IV PUSH: CPT

## 2019-10-31 PROCEDURE — 81001 URINALYSIS AUTO W/SCOPE: CPT

## 2019-10-31 PROCEDURE — 82553 CREATINE MB FRACTION: CPT

## 2019-10-31 PROCEDURE — 93970 EXTREMITY STUDY: CPT

## 2019-10-31 PROCEDURE — 84443 ASSAY THYROID STIM HORMONE: CPT

## 2019-10-31 PROCEDURE — 90732 PPSV23 VACC 2 YRS+ SUBQ/IM: CPT

## 2019-10-31 PROCEDURE — 99285 EMERGENCY DEPT VISIT HI MDM: CPT | Mod: 25

## 2019-10-31 PROCEDURE — 99232 SBSQ HOSP IP/OBS MODERATE 35: CPT

## 2019-10-31 PROCEDURE — 95816 EEG AWAKE AND DROWSY: CPT

## 2019-10-31 PROCEDURE — 86703 HIV-1/HIV-2 1 RESULT ANTBDY: CPT

## 2019-10-31 PROCEDURE — 97535 SELF CARE MNGMENT TRAINING: CPT

## 2019-10-31 PROCEDURE — 83690 ASSAY OF LIPASE: CPT

## 2019-10-31 PROCEDURE — 94640 AIRWAY INHALATION TREATMENT: CPT

## 2019-10-31 PROCEDURE — 70450 CT HEAD/BRAIN W/O DYE: CPT

## 2019-10-31 PROCEDURE — 96361 HYDRATE IV INFUSION ADD-ON: CPT

## 2019-10-31 PROCEDURE — 86850 RBC ANTIBODY SCREEN: CPT

## 2019-10-31 PROCEDURE — 97110 THERAPEUTIC EXERCISES: CPT

## 2019-10-31 PROCEDURE — 71275 CT ANGIOGRAPHY CHEST: CPT

## 2019-10-31 PROCEDURE — 87086 URINE CULTURE/COLONY COUNT: CPT

## 2019-10-31 PROCEDURE — 97161 PT EVAL LOW COMPLEX 20 MIN: CPT

## 2019-10-31 PROCEDURE — 86900 BLOOD TYPING SEROLOGIC ABO: CPT

## 2019-10-31 RX ORDER — PNEUMOCOCCAL 23-VAL P-SAC VAC 25MCG/0.5
0.5 VIAL (ML) INJECTION ONCE
Refills: 0 | Status: COMPLETED | OUTPATIENT
Start: 2019-10-31 | End: 2019-10-31

## 2019-10-31 RX ORDER — INSULIN LISPRO 100/ML
5 VIAL (ML) SUBCUTANEOUS
Qty: 0 | Refills: 0 | DISCHARGE

## 2019-10-31 RX ORDER — INSULIN LISPRO 100/ML
7 VIAL (ML) SUBCUTANEOUS
Refills: 0 | Status: DISCONTINUED | OUTPATIENT
Start: 2019-10-31 | End: 2019-10-31

## 2019-10-31 RX ORDER — INSULIN GLARGINE 100 [IU]/ML
10 INJECTION, SOLUTION SUBCUTANEOUS AT BEDTIME
Refills: 0 | Status: DISCONTINUED | OUTPATIENT
Start: 2019-10-31 | End: 2019-10-31

## 2019-10-31 RX ADMIN — Medication 0.5 MILLILITER(S): at 13:45

## 2019-10-31 RX ADMIN — Medication 4: at 12:32

## 2019-10-31 RX ADMIN — Medication 10 MILLIGRAM(S): at 05:26

## 2019-10-31 RX ADMIN — Medication 1 GRAM(S): at 13:06

## 2019-10-31 RX ADMIN — Medication 2: at 08:36

## 2019-10-31 RX ADMIN — LOSARTAN POTASSIUM 25 MILLIGRAM(S): 100 TABLET, FILM COATED ORAL at 05:26

## 2019-10-31 RX ADMIN — Medication 1 GRAM(S): at 00:02

## 2019-10-31 RX ADMIN — PANTOPRAZOLE SODIUM 40 MILLIGRAM(S): 20 TABLET, DELAYED RELEASE ORAL at 05:26

## 2019-10-31 RX ADMIN — Medication 1 GRAM(S): at 05:26

## 2019-10-31 RX ADMIN — Medication 7 UNIT(S): at 12:32

## 2019-10-31 RX ADMIN — APIXABAN 5 MILLIGRAM(S): 2.5 TABLET, FILM COATED ORAL at 05:27

## 2019-10-31 RX ADMIN — CLOPIDOGREL BISULFATE 75 MILLIGRAM(S): 75 TABLET, FILM COATED ORAL at 13:06

## 2019-10-31 RX ADMIN — ISOSORBIDE MONONITRATE 30 MILLIGRAM(S): 60 TABLET, EXTENDED RELEASE ORAL at 13:06

## 2019-10-31 NOTE — PROGRESS NOTE ADULT - PROVIDER SPECIALTY LIST ADULT
Cardiology
Cardiology
Endocrinology
Endocrinology
Internal Medicine
Neurology
Cardiology
Internal Medicine
Internal Medicine

## 2019-10-31 NOTE — PROGRESS NOTE ADULT - SUBJECTIVE AND OBJECTIVE BOX
Patient is a 49y old  Male who presents with a chief complaint of Right sided weakness and facial droop (30 Oct 2019 15:37)      INTERVAL HPI/OVERNIGHT EVENTS: Patient examined at bedside. Patient admits ******. Patient denies N/V/D, chest pain, SOB, abdominal pain. No other complaints at this time. No acute overnight events appreciated.    T(C): 36.8 (10-31-19 @ 07:12), Max: 36.9 (10-30-19 @ 20:12)  HR: 58 (10-31-19 @ 07:12) (54 - 78)  BP: 129/69 (10-31-19 @ 07:12) (97/58 - 138/79)  RR: 18 (10-31-19 @ 07:12) (16 - 18)  SpO2: 92% (10-31-19 @ 07:12) (91% - 98%)  Wt(kg): --  I&O's Summary    30 Oct 2019 07:01  -  31 Oct 2019 07:00  --------------------------------------------------------  IN: 0 mL / OUT: 2650 mL / NET: -2650 mL        LABS:                        9.2    7.99  )-----------( 377      ( 30 Oct 2019 07:33 )             30.8     10-30    142  |  108  |  15  ----------------------------<  178<H>  3.9   |  26  |  0.94    Ca    8.9      30 Oct 2019 07:33          CAPILLARY BLOOD GLUCOSE      POCT Blood Glucose.: 242 mg/dL (30 Oct 2019 21:47)  POCT Blood Glucose.: 218 mg/dL (30 Oct 2019 17:10)  POCT Blood Glucose.: 245 mg/dL (30 Oct 2019 13:02)  POCT Blood Glucose.: 216 mg/dL (30 Oct 2019 11:54)  POCT Blood Glucose.: 187 mg/dL (30 Oct 2019 08:08)            MEDICATIONS  (STANDING):  apixaban 5 milliGRAM(s) Oral every 12 hours  atorvastatin 80 milliGRAM(s) Oral at bedtime  clopidogrel Tablet 75 milliGRAM(s) Oral daily  dextrose 5%. 1000 milliLiter(s) (50 mL/Hr) IV Continuous <Continuous>  dextrose 50% Injectable 12.5 Gram(s) IV Push once  dextrose 50% Injectable 25 Gram(s) IV Push once  dextrose 50% Injectable 25 Gram(s) IV Push once  insulin glargine Injectable (LANTUS) 6 Unit(s) SubCutaneous at bedtime  insulin lispro (HumaLOG) corrective regimen sliding scale   SubCutaneous three times a day before meals  insulin lispro (HumaLOG) corrective regimen sliding scale   SubCutaneous at bedtime  insulin lispro Injectable (HumaLOG) 5 Unit(s) SubCutaneous three times a day before meals  isosorbide   mononitrate ER Tablet (IMDUR) 30 milliGRAM(s) Oral daily  losartan 25 milliGRAM(s) Oral daily  methimazole 10 milliGRAM(s) Oral daily  metoprolol tartrate 25 milliGRAM(s) Oral two times a day  pantoprazole    Tablet 40 milliGRAM(s) Oral before breakfast  predniSONE   Tablet 10 milliGRAM(s) Oral daily  sucralfate 1 Gram(s) Oral four times a day  tamsulosin 0.4 milliGRAM(s) Oral at bedtime    MEDICATIONS  (PRN):  dextrose 40% Gel 15 Gram(s) Oral once PRN Blood Glucose LESS THAN 70 milliGRAM(s)/deciliter  glucagon  Injectable 1 milliGRAM(s) IntraMuscular once PRN Glucose LESS THAN 70 milligrams/deciliter      REVIEW OF SYSTEMS:  CONSTITUTIONAL: No fever, weight loss, or fatigue  EYES: No eye pain, visual disturbances, or discharge  ENMT:  No difficulty hearing, tinnitus, No sinus or throat pain  NECK: No pain or stiffness  RESPIRATORY: No cough, wheezing, chills or hemoptysis; No shortness of breath  CARDIOVASCULAR: No chest pain, palpitations, dizziness, or leg swelling  GASTROINTESTINAL: No abdominal or epigastric pain. No nausea, vomiting, or hematemesis; No diarrhea or constipation. No melena or hematochezia.  NEUROLOGICAL: No headaches, memory loss. Admits to R LE weakness.   MUSCULOSKELETAL: No joint pain or swelling; No muscle, back, or extremity pain. +R LE weakness.   PSYCHIATRIC: No depression, anxiety    RADIOLOGY & ADDITIONAL TESTS: No acute imaging in the last 24 hours    Imaging Personally Reviewed:  [ X ] YES  [ ] NO    Consultant(s) Notes Reviewed:  [ X ] YES  [ ] NO    Physical EXAM:  GENERAL: NAD, well-groomed, well-developed  HEAD:  Atraumatic, Normocephalic  EYES: PERRLA, conjunctiva and sclera clear  ENMT: No tonsillar erythema, exudates, or enlargement; Moist mucous membranes, Good dentition, No lesions  NECK: Supple, soft.   NERVOUS SYSTEM:  Alert & Oriented X3, Good concentration; Motor Strength 4/5 b/l upper ext and L LE. R LE 3/5 strength. + strength. sensation ext and face intact, no tremor. No facial droop.   CHEST/LUNG: Clear to percussion bilaterally; No rales, rhonchi, wheezing, or rubs  HEART: Regular rate and rhythm; No murmurs, rubs, or gallops  ABDOMEN: Soft, Nontender, Nondistended; Bowel sounds present  EXTREMITIES:  2+ Peripheral Pulses, No clubbing, cyanosis, or edema    Care Discussed with Consultants/Other Providers [ X ] YES  [ ] NO Patient is a 49y old  Male who presents with a chief complaint of Right sided weakness and facial droop (30 Oct 2019 15:37)      INTERVAL HPI/OVERNIGHT EVENTS: Patient examined at bedside. Patient states he is feeling well and ready to go to rehab. Patient denies N/V/D, chest pain, SOB, abdominal pain. No other complaints at this time. No acute overnight events appreciated.    T(C): 36.8 (10-31-19 @ 07:12), Max: 36.9 (10-30-19 @ 20:12)  HR: 58 (10-31-19 @ 07:12) (54 - 78)  BP: 129/69 (10-31-19 @ 07:12) (97/58 - 138/79)  RR: 18 (10-31-19 @ 07:12) (16 - 18)  SpO2: 92% (10-31-19 @ 07:12) (91% - 98%)  Wt(kg): --  I&O's Summary    30 Oct 2019 07:01  -  31 Oct 2019 07:00  --------------------------------------------------------  IN: 0 mL / OUT: 2650 mL / NET: -2650 mL        LABS:                        9.2    7.99  )-----------( 377      ( 30 Oct 2019 07:33 )             30.8     10-30    142  |  108  |  15  ----------------------------<  178<H>  3.9   |  26  |  0.94    Ca    8.9      30 Oct 2019 07:33          CAPILLARY BLOOD GLUCOSE      POCT Blood Glucose.: 242 mg/dL (30 Oct 2019 21:47)  POCT Blood Glucose.: 218 mg/dL (30 Oct 2019 17:10)  POCT Blood Glucose.: 245 mg/dL (30 Oct 2019 13:02)  POCT Blood Glucose.: 216 mg/dL (30 Oct 2019 11:54)  POCT Blood Glucose.: 187 mg/dL (30 Oct 2019 08:08)            MEDICATIONS  (STANDING):  apixaban 5 milliGRAM(s) Oral every 12 hours  atorvastatin 80 milliGRAM(s) Oral at bedtime  clopidogrel Tablet 75 milliGRAM(s) Oral daily  dextrose 5%. 1000 milliLiter(s) (50 mL/Hr) IV Continuous <Continuous>  dextrose 50% Injectable 12.5 Gram(s) IV Push once  dextrose 50% Injectable 25 Gram(s) IV Push once  dextrose 50% Injectable 25 Gram(s) IV Push once  insulin glargine Injectable (LANTUS) 6 Unit(s) SubCutaneous at bedtime  insulin lispro (HumaLOG) corrective regimen sliding scale   SubCutaneous three times a day before meals  insulin lispro (HumaLOG) corrective regimen sliding scale   SubCutaneous at bedtime  insulin lispro Injectable (HumaLOG) 5 Unit(s) SubCutaneous three times a day before meals  isosorbide   mononitrate ER Tablet (IMDUR) 30 milliGRAM(s) Oral daily  losartan 25 milliGRAM(s) Oral daily  methimazole 10 milliGRAM(s) Oral daily  metoprolol tartrate 25 milliGRAM(s) Oral two times a day  pantoprazole    Tablet 40 milliGRAM(s) Oral before breakfast  predniSONE   Tablet 10 milliGRAM(s) Oral daily  sucralfate 1 Gram(s) Oral four times a day  tamsulosin 0.4 milliGRAM(s) Oral at bedtime    MEDICATIONS  (PRN):  dextrose 40% Gel 15 Gram(s) Oral once PRN Blood Glucose LESS THAN 70 milliGRAM(s)/deciliter  glucagon  Injectable 1 milliGRAM(s) IntraMuscular once PRN Glucose LESS THAN 70 milligrams/deciliter      REVIEW OF SYSTEMS:  CONSTITUTIONAL: No fever, weight loss, or fatigue  EYES: No eye pain, visual disturbances, or discharge  ENMT:  No difficulty hearing, tinnitus, No sinus or throat pain  NECK: No pain or stiffness  RESPIRATORY: No cough, wheezing, chills or hemoptysis; No shortness of breath  CARDIOVASCULAR: No chest pain, palpitations, dizziness, or leg swelling  GASTROINTESTINAL: No abdominal or epigastric pain. No nausea, vomiting, or hematemesis; No diarrhea or constipation. No melena or hematochezia.  NEUROLOGICAL: No headaches, memory loss. Admits to R LE weakness.   MUSCULOSKELETAL: No joint pain or swelling; No muscle, back, or extremity pain.   PSYCHIATRIC: No depression, anxiety    RADIOLOGY & ADDITIONAL TESTS: No acute imaging in the last 24 hours    Imaging Personally Reviewed:  [ X ] YES  [ ] NO    Consultant(s) Notes Reviewed:  [ X ] YES  [ ] NO    Physical EXAM:  GENERAL: NAD, well-groomed, well-developed  HEAD:  Atraumatic, Normocephalic  EYES: PERRLA, conjunctiva and sclera clear  ENMT: No tonsillar erythema, exudates, or enlargement; Moist mucous membranes, Good dentition, No lesions  NECK: Supple, soft.   NERVOUS SYSTEM:  Alert & Oriented X3, Good concentration; Motor Strength 4/5 b/l upper ext and L LE. R LE 3/5 strength. + strength. sensation ext and face intact, no tremor. No facial droop.   CHEST/LUNG: Clear to percussion bilaterally; No rales, rhonchi, wheezing, or rubs  HEART: Regular rate and rhythm; No murmurs, rubs, or gallops  ABDOMEN: Soft, Nontender, Nondistended; Bowel sounds present  EXTREMITIES:  2+ Peripheral Pulses, No clubbing, cyanosis, or edema    Care Discussed with Consultants/Other Providers [ X ] YES  [ ] NO

## 2019-10-31 NOTE — PROGRESS NOTE ADULT - ASSESSMENT
Henrietta Kim DNP, NP-C  Cardiology   Spectra #[phone] 50 yo male with PMHx significant of CAD s/p BMS to RCA (8/2019), RUL subsegmental PE (6/2019, on Eliquis), hx of NSTEMI and PAF s/p ex-lap omentopexy and plication for perforated antral ulcer (5/2019), osteomyelitis s/p debridement, CVA s/p tPA (8/2019), HTN and DM2 (not on insulin) presenting to the ED with right sided weakness, facial droop, and slurred speech, ? amiodarone induced hyperthyroidism:    Neuro symptoms; TIA? s/p CVA  - MRI/CT head negative.  EEG negative  - No further neuro symptoms.  No evidence of significant arrhythmias on tele.  He remained in [facility] overnight  - Neuro following  - No evidence of cardiac arrhythmia while on tele.  No significant bradycardia on flow sheet, range 53-78.  Patient totally asymptomatic    Afib  - Amiodarone discontinued as it may causing his low TSH   - Beta blocker started for rate-control.  Continue with lopressor BID with hold parameter  - Continue Eliquis BID   - Monitor electrolytes, replete to keep K>4 and Mag>2    CAD s/p PCI  - Continue Plavix 75 QD  - Continue statin  - Continue Imdur  - Continue Losartan    DM2  - Endo following    Awaiting discharge to [facility].  Stable from cardiac standpoint    Henrietta Kim DNP, NP-C  Cardiology   Spectra #[phone]

## 2019-10-31 NOTE — PROGRESS NOTE ADULT - PROBLEM SELECTOR PROBLEM 7
Hypertension
Type 2 diabetes mellitus
Hypertension
Hypertension

## 2019-10-31 NOTE — PROGRESS NOTE ADULT - PROBLEM SELECTOR PLAN 8
- Chronic, Stable  - HbA1C 7.4, improved from 8/1/19 was 8.3  - Patient does not recall what his regimen in rehab  - During his last admission, patient was well controlled on 8 units long acting lantus and 5 units humalog TID   - increase lantus 10 units qhs  - increase humalog 7 units 3x/day before meals  - continue moderate insulin sliding scale  - Endo, Dr. Perlman following, recs appreciated.

## 2019-10-31 NOTE — PROGRESS NOTE ADULT - PROBLEM SELECTOR PROBLEM 2
Thyrotoxicosis
Thyrotoxicosis
CAD (coronary artery disease)
CAD (coronary artery disease)
Hyperthyroidism
CAD (coronary artery disease)

## 2019-10-31 NOTE — PROGRESS NOTE ADULT - PROBLEM SELECTOR PROBLEM 1
Type 2 diabetes mellitus
Type 2 diabetes mellitus
CVA (cerebral vascular accident)

## 2019-10-31 NOTE — PROGRESS NOTE ADULT - SUBJECTIVE AND OBJECTIVE BOX
Neurology Follow up note    SARAH [id number]    HPI:  Patient is a 50 yo male with PMHx significant of CAD s/p BMS to RCA (8/2019), RUL subsegmental PE (6/2019, on Eliquis), hx of NSTEMI and PAF s/p ex-lap omentopexy and plication for perforated antral ulcer (5/2019), osteomyelitis s/p debridement, CVA s/p tPA (8/2019), HTN and DM2 (not on insulin) presenting to the ED with right sided weakness, facial droop, and slurred speech. Patient states that around 530pm today he suddenly experienced blurred vision, right sided weakness, and right sided facial drooping. He is currently in a rehab facility. There he notified his roommate who alerted the nurse, who then called for a transport to the emergency room. Patient was not a candidate for tPA due to his comorbidities. Patient endorses SOB at the time he was experiencing his symptoms of weakness and facial drooping. He is currently being treated for a UTI with ciprofloxacin(day 2) that was diagnosed earlier this week. Of note, in Aug of this year he presented with similar symptoms that was treated with tPA. Since then, the patient states that he was able to recover all weakness except for right lower extremity. He currently receives PT/OT 5x a week in the rehab facility. Patient denies chest pain, SOB, fever, chills, vomiting and diarrhea. Patient endorses nausea and mild abdominal pain that he says is resolving since his diagnoses of UTI. He has a chronic urinary catheter in place for his chronic urinary retention.         In the ED:   Vitals: /72 HR 79 RR 19 SpO2 94 % @RA  Labs: h/h 9.9/32.6, BMP WNL except for alb 2.6. CE x1 normal   EKG Sinus rhythm with premature atrial complexes   CTA head:   1. Widely patent head and neck vasculature. No intraluminal thrombus or   dissection noted. 2. Incidental right MCA bifurcation aneurysm. Interval reassessment and   follow-up is recommended.  CT head:  Mild chronic microvascular changes without evidence of an   acute transcortical infarction or hemorrhage. (25 Oct 2019 21:35)      Interval History - no new weakness    Patient is seen, chart was reviewed and case was discussed with the treatment team.  Pt is not in any distress.   Lying on bed comfortably.   No events reported overnight.   No clinical seizure was reported.      Vital Signs Last 24 Hrs  T(C): 36.8 (31 Oct 2019 07:12), Max: 36.9 (30 Oct 2019 20:12)  T(F): 98.2 (31 Oct 2019 07:12), Max: 98.5 (30 Oct 2019 20:12)  HR: 58 (31 Oct 2019 07:12) (54 - 78)  BP: 129/69 (31 Oct 2019 07:12) (97/58 - 138/79)  BP(mean): --  RR: 18 (31 Oct 2019 07:12) (16 - 18)  SpO2: 92% (31 Oct 2019 07:12) (91% - 97%)        REVIEW OF SYSTEMS:    Constitutional: No fever, weight loss or fatigue  Eyes: No eye pain, visual disturbances, or discharge  ENT:  No difficulty hearing, tinnitus, vertigo; No sinus or throat pain  Neck: No pain or stiffness  Respiratory: No cough, wheezing, chills or hemoptysis  Cardiovascular: No chest pain, palpitations, shortness of breath, dizziness or leg swelling  Gastrointestinal: No abdominal or epigastric pain. No nausea, vomiting or hematemesis;  Genitourinary: No dysuria, frequency, hematuria or incontinence  Neurological: No headaches, memory loss, loss of strength, numbness or tremors  Psychiatric: No depression, anxiety, mood swings or difficulty sleeping  Musculoskeletal: No joint pain or swelling; No muscle, back or extremity pain  Skin: No itching, burning, rashes or lesions   Lymph Nodes: No enlarged glands  Endocrine: No heat or cold intolerance; No hair loss,  Allergy and Immunologic: No hives or eczema    On Neurological Examination:    Mental Status - Pt is alert, awake, oriented X3. Follows commands well and able to answer questions appropriately  .Mood and affect  normal    Speech -  Normal.     Cranial Nerves - Pupils 3 mm equal and reactive to light, extraocular eye movements intact.   Pt has no  facial asymmetry. Facial sensation is intact.    Muscle tone - is normal all over. Moves all extremities equally.     Motor Exam - right hemiparesis; 4/5 ; give away type of weakness.    Sensory Exam -  Pt withdraws all extremities equally on stimulation. No asymmetry seen. No complaints of tingling, numbness.        coordination:    Finger to nose: normal      Deep tendon Reflexes - 2 plus all over.       Neck Supple -  Yes.     MEDICATIONS    apixaban 5 milliGRAM(s) Oral every 12 hours  atorvastatin 80 milliGRAM(s) Oral at bedtime  clopidogrel Tablet 75 milliGRAM(s) Oral daily  dextrose 40% Gel 15 Gram(s) Oral once PRN  dextrose 5%. 1000 milliLiter(s) IV Continuous <Continuous>  dextrose 50% Injectable 12.5 Gram(s) IV Push once  dextrose 50% Injectable 25 Gram(s) IV Push once  dextrose 50% Injectable 25 Gram(s) IV Push once  glucagon  Injectable 1 milliGRAM(s) IntraMuscular once PRN  insulin glargine Injectable (LANTUS) 6 Unit(s) SubCutaneous at bedtime  insulin lispro (HumaLOG) corrective regimen sliding scale   SubCutaneous three times a day before meals  insulin lispro (HumaLOG) corrective regimen sliding scale   SubCutaneous at bedtime  insulin lispro Injectable (HumaLOG) 5 Unit(s) SubCutaneous three times a day before meals  isosorbide   mononitrate ER Tablet (IMDUR) 30 milliGRAM(s) Oral daily  pantoprazole    Tablet 40 milliGRAM(s) Oral before breakfast  sucralfate 1 Gram(s) Oral four times a day  tamsulosin 0.4 milliGRAM(s) Oral at bedtime      Allergies    fish (Hives)  No Known Drug Allergies    Intolerances                          9.6    6.89  )-----------( 390      ( 31 Oct 2019 08:58 )             32.1       Hemoglobin A1C:   Lipid Panel 10-26 @ 13:26  Total Cholesterol, Serum 77  LDL 32  Triglycerides 65    Vitamin B12   < from: EEG Awake or Drowsy (10.28.19 @ 14:30) >     EXAM:  EEG-AWAKE AND DROWSY        PROCEDURE DATE:  10/28/2019        INTERPRETATION:  GENERAL DESCRIPTION: The EEG was recorded with a   32-channel digital EEG machine. The standard international 10/20   electrode placements were used.    CONDITIONS OF RECORDING: The EEG was carried out with the patient in the   awake and drowsy states. Muscle and movement artifacts are present during   the waking record    DESCRIPTION: The resting waking  background rhythms consist of moderate   voltage, symmetrical activity which is fairly well organized. The   frequency spectrum showed a moderate amount of theta and beta activity   and a negligible amount of delta activity. The posterior dominant rhythm   of 8 Hz, that is reactive to eye opening and closing was seen.    The patient drowses with slowing to irregular low voltage theta and beta   activity.    Hyperventilation was not performed for medical reasons. Intermittent   photic stimulation from 2-20 flashes per second fails to elicit any   abnormality.    There were no areas of focal slowing, epileptiform discharges or   electrographic seizures.      IMPRESSION: Normal EEG    COMMENT: A 24-48 hour EEG might be desirable to further evaluate for   possible seizure disorder, if clinically necessary. This EEG does not   exclude the clinical diagnosis of seizures or epilepsy.      CARIDAD RIOS M.D., Attending Neurologist  This document has been electronically signed. Oct 28 2019 12:58PM              RADIOLOGY    ASSESSMENT AND PLAN:      presented with right sided weakness with slurred speech;  brain mri no acute cva ;TIA ?CONVERSION DISORDER; DOUBT SEIZURE  HYPERTHYROID STATE.    NEURO COTTER STABLE FOR ELIAS  NO FURTHER NEURO BARGER.  EEG REPORTED NO SEIZURE ACTIVITY.  CONTINUE AC  Physical therapy evaluation.  OOB to chair/ambulation with assistance only.  Advanced care planning was discussed with family.  Pain is accessed and addressed..  Plan of care was discussed with family. Questions answered.  Would continue to follow.

## 2019-10-31 NOTE — PROGRESS NOTE ADULT - PROBLEM SELECTOR PROBLEM 4
Afib
History of non-ST elevation myocardial infarction (NSTEMI)
Afib
Afib

## 2019-10-31 NOTE — PROGRESS NOTE ADULT - PROBLEM SELECTOR PROBLEM 10
Hyperthyroidism
Need for prophylactic measure
Need for prophylactic measure
Hyperthyroidism
Hyperthyroidism
Need for prophylactic measure

## 2019-10-31 NOTE — PROGRESS NOTE ADULT - PROBLEM SELECTOR PLAN 6
- Diagnosed with RUL PE in 6/2019  - Stable, patient denies symptoms of SOB at this time   - Cont Eliquis 5mg BID  - Cont to monitor
- Chronic, Stable, Currently 123/72  - Continue home medication of losartan 25mg  - Continue to monitor
- Diagnosed with RUL PE in 6/2019  - Stable, patient denies symptoms of SOB at this time   - Cont Eliquis 5mg BID  - Cont to monitor
- Diagnosed with RUL PE in 6/2019  - Stable, patient denies symptoms of SOB at this time   - Cont Eliquis 5mg BID  - Cont to monitor

## 2019-10-31 NOTE — PROGRESS NOTE ADULT - PROBLEM SELECTOR PLAN 1
- Patient admitted for CVA. Right sided facial drooping (improved while in ER), Right sided weakness in upper and lower extremity.  - Patient had previous CVA on 8/2019 s/p TPA in the ED (around 22:00)  - Patient did not receive TPA this admission, currently on Eliquis and clopidogrel  - CT head shows no intraluminal thrombus or dissection. Incidental right MCA bifurcation aneurysm. Mild chronic microvascular changes without evidence of an   acute transcortical infarction or hemorrhage.   - MRI neg for acute cva  - cont neuro checks q4hr for now  - lipid panel result noted  - HbA1C 7.4, improved from 8.3 on 8/1/2019  - c/w atorvastatin 80mg   - cont PT eval/ OT eval  - Dr. Jon, neuro, consulted, rec appreciated  - Stable from neuro prospective for ELIAS, as per Dr. Jon  - EEG normal study

## 2019-10-31 NOTE — PROGRESS NOTE ADULT - ATTENDING COMMENTS
I personally saw and examined the patient in detail.  I have spoken to the above provider regarding the assessment and plan of care.  I reviewed the above assessment and plan of care, and agree.  I have made changes in the body of the note where appropriate.
Seen/examined. agree with above.
Chart reviewed    Patient seen and examined    Agree with plan as outlined above
I saw and examined the patient personally. Spoke with above provider regarding this case. I reviewed the above findings completely.  I agree with the above history, physical, and plan which I have edited where appropriate.

## 2019-10-31 NOTE — PROGRESS NOTE ADULT - PROBLEM SELECTOR PROBLEM 5
History of non-ST elevation myocardial infarction (NSTEMI)
Pulmonary embolism
History of non-ST elevation myocardial infarction (NSTEMI)
History of non-ST elevation myocardial infarction (NSTEMI)

## 2019-10-31 NOTE — DISCHARGE NOTE NURSING/CASE MANAGEMENT/SOCIAL WORK - PATIENT PORTAL LINK FT
You can access the FollowMyHealth Patient Portal offered by [facility] by registering at the following website: [email or web address]. By joining [facility]’s FollowMyHealth portal, you will also be able to view your health information using other applications (apps) compatible with our system.

## 2019-10-31 NOTE — PROGRESS NOTE ADULT - PROBLEM SELECTOR PLAN 4
- Chronic, PAF, HR controlled   - Eliquis 5mg BID  - D/C Amiodarone home dose  - Start metoprolol tartate 25mg bid as per cardio recs
- NSTEMI s/p ex-lap omentopexy and plication in 5/2019 of perforated ulcer  - stable angina at times, non present currently on admission   - Continue isosorbide and atorvastatin 80mg at home
- Chronic, PAF, HR controlled   - Eliquis 5mg BID  - D/C Amiodarone home dose  - cont metoprolol tartate 25mg bid as per cardio recs
- Chronic, PAF, HR controlled   - Patient on amiodarone 100mg QD and Eliquis 5mg BID  - Continue home medication

## 2019-10-31 NOTE — PROGRESS NOTE ADULT - REASON FOR ADMISSION
Right sided weakness and facial droop

## 2019-10-31 NOTE — PROGRESS NOTE ADULT - PROBLEM SELECTOR PLAN 3
- Patient had bare metal stent placed on 7/31/19  - Patient currently on Eliquis 5 bid and clopidogrel 75mg   - Continue eliquis and plavix  - Continue home medication of atorvastatin

## 2019-10-31 NOTE — PROGRESS NOTE ADULT - PROBLEM SELECTOR PLAN 5
- NSTEMI s/p ex-lap omentopexy and plication in 5/2019 of perforated ulcer  - stable angina at times, non present currently on admission   - Continue isosorbide and atorvastatin 80mg at home
- Diagnosed with RUL PE in 6/2019  - Stable, patient denies symptoms of SOB at this time   - Cont Eliquis 5mg BID  - Cont to monitor
- NSTEMI s/p ex-lap omentopexy and plication in 5/2019 of perforated ulcer  - stable angina at times, non present currently on admission   - Continue isosorbide and atorvastatin 80mg at home
- NSTEMI s/p ex-lap omentopexy and plication in 5/2019 of perforated ulcer  - stable angina at times, non present currently on admission   - Continue isosorbide and atorvastatin 80mg at home

## 2019-10-31 NOTE — PROGRESS NOTE ADULT - SUBJECTIVE AND OBJECTIVE BOX
[facility] -- Bolivar Carbajal, Brittany Gallegos Pannella, Patel, Savella, Goodger  Office # [phone]    Follow Up:  Neuro Symptoms    Subjective/Observations:     REVIEW OF SYSTEMS: All other review of systems is negative unless indicated above  PAST MEDICAL & SURGICAL HISTORY:  Cerebrovascular accident  CAD S/P percutaneous coronary angioplasty  Osteomyelitis: s/p debridement  History of non-ST elevation myocardial infarction (NSTEMI)  Pulmonary embolism  Perforated gastric ulcer: s/p emergent ex-lap omentopexy and plication 6/2019  BPH (benign prostatic hyperplasia)  Hypertension  Afib  Diabetes mellitus with no complication  Diabetes  Traumatic amputation of left foot, initial encounter  Perforated gastric ulcer  H/O abdominal surgery    MEDICATIONS  (STANDING):  apixaban 5 milliGRAM(s) Oral every 12 hours  atorvastatin 80 milliGRAM(s) Oral at bedtime  clopidogrel Tablet 75 milliGRAM(s) Oral daily  dextrose 5%. 1000 milliLiter(s) (50 mL/Hr) IV Continuous <Continuous>  dextrose 50% Injectable 12.5 Gram(s) IV Push once  dextrose 50% Injectable 25 Gram(s) IV Push once  dextrose 50% Injectable 25 Gram(s) IV Push once  insulin glargine Injectable (LANTUS) 10 Unit(s) SubCutaneous at bedtime  insulin lispro (HumaLOG) corrective regimen sliding scale   SubCutaneous three times a day before meals  insulin lispro (HumaLOG) corrective regimen sliding scale   SubCutaneous at bedtime  insulin lispro Injectable (HumaLOG) 7 Unit(s) SubCutaneous three times a day before meals  isosorbide   mononitrate ER Tablet (IMDUR) 30 milliGRAM(s) Oral daily  losartan 25 milliGRAM(s) Oral daily  methimazole 10 milliGRAM(s) Oral daily  metoprolol tartrate 25 milliGRAM(s) Oral two times a day  pantoprazole    Tablet 40 milliGRAM(s) Oral before breakfast  predniSONE   Tablet 10 milliGRAM(s) Oral daily  sucralfate 1 Gram(s) Oral four times a day  tamsulosin 0.4 milliGRAM(s) Oral at bedtime    MEDICATIONS  (PRN):  dextrose 40% Gel 15 Gram(s) Oral once PRN Blood Glucose LESS THAN 70 milliGRAM(s)/deciliter  glucagon  Injectable 1 milliGRAM(s) IntraMuscular once PRN Glucose LESS THAN 70 milligrams/deciliter    Allergies    fish (Hives)  No Known Drug Allergies    Intolerances      Vital Signs Last 24 Hrs  T(C): 36.8 (31 Oct 2019 07:12), Max: 36.9 (30 Oct 2019 20:12)  T(F): 98.2 (31 Oct 2019 07:12), Max: 98.5 (30 Oct 2019 20:12)  HR: 58 (31 Oct 2019 07:12) (54 - 78)  BP: 129/69 (31 Oct 2019 07:12) (97/58 - 138/79)  BP(mean): --  RR: 18 (31 Oct 2019 07:12) (16 - 18)  SpO2: 92% (31 Oct 2019 07:12) (91% - 97%)  I&O's Summary    30 Oct 2019 07:01  -  31 Oct 2019 07:00  --------------------------------------------------------  IN: 0 mL / OUT: 2650 mL / NET: -2650 mL    31 Oct 2019 07:01  -  31 Oct 2019 11:59  --------------------------------------------------------  IN: 240 mL / OUT: 0 mL / NET: 240 mL        PHYSICAL EXAM:  TELE:   Constitutional: NAD, awake and alert, well-developed  HEENT: Moist Mucous Membranes, Anicteric  Pulmonary: Non-labored, breath sounds are clear bilaterally, No wheezing, rales or rhonchi  Cardiovascular: Regular, S1 and S2, No murmurs, rubs, gallops or clicks  Gastrointestinal: Bowel Sounds present, soft, nontender.   Lymph: No peripheral edema. No lymphadenopathy.  Skin: No visible rashes or ulcers.  Psych:  Mood & affect appropriate  LABS: All Labs Reviewed:                        9.6    6.89  )-----------( 390      ( 31 Oct 2019 08:58 )             32.1                         9.2    7.99  )-----------( 377      ( 30 Oct 2019 07:33 )             30.8                         8.9    8.38  )-----------( 408      ( 29 Oct 2019 07:13 )             29.2     31 Oct 2019 08:58    141    |  108    |  15     ----------------------------<  206    4.3     |  28     |  1.00   30 Oct 2019 07:33    142    |  108    |  15     ----------------------------<  178    3.9     |  26     |  0.94   29 Oct 2019 07:13    142    |  108    |  13     ----------------------------<  180    3.9     |  28     |  0.89     Ca    8.9        31 Oct 2019 08:58  Ca    8.9        30 Oct 2019 07:33  Ca    8.2        29 Oct 2019 07:13    < from: TTE Echo Doppler w/o Cont (08.15.19 @ 16:33) >     EXAM:  ECHO TTE WO CON COMP W DOPPLR         PROCEDURE DATE:  08/15/2019        INTERPRETATION:  INDICATION: Atrial fibrillation    Blood Pressure 128/64    Height 187.9 cm     Weight 91 kg       BSA 2.18   sq m    Dimensions:    LA 3.7       Normal Values: 2.0 - 4.0 cm    Ao 3.6        Normal Values: 2.0 - 3.8 cm  SEPTUM 1.6       Normal Values: 0.6 - 1.2 cm  PWT 1.4       Normal Values: 0.6 - 1.1 cm  LVIDd 5.3         Normal Values: 3.0 - 5.6 cm  LVIDs 2.7         Normal Values: 1.8 - 4.0 cm      OBSERVATIONS:  Technically difficult study  Mitral Valve: normal, mild to moderate MR.  Aortic Valve/Aorta: Aortic valve is not well-visualized, likely normal   function.  Tricuspid Valve: normal with trace TR.  Pulmonic Valve: normal  Left Atrium: Enlarged  Right Atrium: normal  Left Ventricle: normal LV size and systolic function, estimated LVEF of   65%. Left ventricular hypertrophy  Right Ventricle: Grossly normal size and systolic function.  Pericardium/Pleura: normal, no significant pericardial effusion.  LV Diastolic Function: normal    Conclusion:   Technically difficult study  Normal left ventricular internal dimensions and systolic function,   estimated LVEF of 65%.   Grossly normal RV size and systolic function.   Left atrial enlargement  Aortic valve is not well-visualized, likely normal function.   Mild to moderate MR   Trace TR.    No significant pericardial effusion.      MIROSLAVA CASTAÑEDA   This document has been electronically signed. Aug 16 2019  8:25AM    < end of copied text >    < from: Xray Chest 1 View AP/PA (10.25.19 @ 21:02) >    EXAM:  XR CHEST AP OR PA 1V                          PROCEDURE DATE:  10/25/2019      INTERPRETATION:  History: Stroke    Portable chest x-ray is performed. Comparison is made to 10/14/2019.    The heart is not enlarged. The trachea is midline. Lungs are clear. The   osseous structures demonstrate degenerative changes.    Impression: No active disease. No change    DONALDO GARCIA M.D.,ATTENDING RADIOLOGIST  This document has been electronically signed. Oct 26 2019 10:03AM     < end of copied text >    < from: 12 Lead ECG (10.25.19 @ 19:55) >    Ventricular Rate 71 BPM    Atrial Rate 71 BPM    P-R Interval 140 ms    QRS Duration 84 ms    Q-T Interval 394 ms    QTC Calculation(Bezet) 428 ms    P Axis 71 degrees    R Axis 27 degrees    T Axis 103 degrees    Diagnosis Line Sinus rhythm with premature atrial complexes  ST & T wave abnormality, consider lateral ischemia  Confirmed by JOHN KU ([id number]) on 10/26/2019 11:06:02 AM    < end of copied text > [facility] -- Bolivar Carbajal, Brittany Gallegos, Reynaldo Ku Savella, Goodger  Office # [phone]    Follow Up:  Neuro Symptoms, PAF    Subjective/Observations: sitting on the chair, on RA.  No overnight events.  Denies any discomfort.  NO significant bradycardia per flow sheet    REVIEW OF SYSTEMS: All other review of systems is negative unless indicated above  PAST MEDICAL & SURGICAL HISTORY:  Cerebrovascular accident  CAD S/P percutaneous coronary angioplasty  Osteomyelitis: s/p debridement  History of non-ST elevation myocardial infarction (NSTEMI)  Pulmonary embolism  Perforated gastric ulcer: s/p emergent ex-lap omentopexy and plication 6/2019  BPH (benign prostatic hyperplasia)  Hypertension  Afib  Diabetes mellitus with no complication  Diabetes  Traumatic amputation of left foot, initial encounter  Perforated gastric ulcer  H/O abdominal surgery    MEDICATIONS  (STANDING):  apixaban 5 milliGRAM(s) Oral every 12 hours  atorvastatin 80 milliGRAM(s) Oral at bedtime  clopidogrel Tablet 75 milliGRAM(s) Oral daily  dextrose 5%. 1000 milliLiter(s) (50 mL/Hr) IV Continuous <Continuous>  dextrose 50% Injectable 12.5 Gram(s) IV Push once  dextrose 50% Injectable 25 Gram(s) IV Push once  dextrose 50% Injectable 25 Gram(s) IV Push once  insulin glargine Injectable (LANTUS) 10 Unit(s) SubCutaneous at bedtime  insulin lispro (HumaLOG) corrective regimen sliding scale   SubCutaneous three times a day before meals  insulin lispro (HumaLOG) corrective regimen sliding scale   SubCutaneous at bedtime  insulin lispro Injectable (HumaLOG) 7 Unit(s) SubCutaneous three times a day before meals  isosorbide   mononitrate ER Tablet (IMDUR) 30 milliGRAM(s) Oral daily  losartan 25 milliGRAM(s) Oral daily  methimazole 10 milliGRAM(s) Oral daily  metoprolol tartrate 25 milliGRAM(s) Oral two times a day  pantoprazole    Tablet 40 milliGRAM(s) Oral before breakfast  predniSONE   Tablet 10 milliGRAM(s) Oral daily  sucralfate 1 Gram(s) Oral four times a day  tamsulosin 0.4 milliGRAM(s) Oral at bedtime    MEDICATIONS  (PRN):  dextrose 40% Gel 15 Gram(s) Oral once PRN Blood Glucose LESS THAN 70 milliGRAM(s)/deciliter  glucagon  Injectable 1 milliGRAM(s) IntraMuscular once PRN Glucose LESS THAN 70 milligrams/deciliter    Allergies    fish (Hives)  No Known Drug Allergies    Intolerances    Vital Signs Last 24 Hrs  T(C): 36.8 (31 Oct 2019 07:12), Max: 36.9 (30 Oct 2019 20:12)  T(F): 98.2 (31 Oct 2019 07:12), Max: 98.5 (30 Oct 2019 20:12)  HR: 58 (31 Oct 2019 07:12) (54 - 78)  BP: 129/69 (31 Oct 2019 07:12) (97/58 - 138/79)  BP(mean): --  RR: 18 (31 Oct 2019 07:12) (16 - 18)  SpO2: 92% (31 Oct 2019 07:12) (91% - 97%)  I&O's Summary    30 Oct 2019 07:01  -  31 Oct 2019 07:00  --------------------------------------------------------  IN: 0 mL / OUT: 2650 mL / NET: -2650 mL    31 Oct 2019 07:01  -  31 Oct 2019 11:59  --------------------------------------------------------  IN: 240 mL / OUT: 0 mL / NET: 240 mL     PHYSICAL EXAM:  TELE: Not on tele  Constitutional: NAD, awake and alert, well-developed  HEENT: Moist Mucous Membranes, Anicteric  Pulmonary: Non-labored, breath sounds are diminished bilaterally, No wheezing, rales or rhonchi  Cardiovascular: Regular, S1 and S2, No murmurs, rubs, gallops or clicks  Gastrointestinal: Bowel Sounds present, soft, nontender.   Lymph: No peripheral edema. No lymphadenopathy.  Skin: No visible rashes or ulcers.  : jacques in situ  Psych:  Mood & affect appropriate  LABS: All Labs Reviewed:                        9.6    6.89  )-----------( 390      ( 31 Oct 2019 08:58 )             32.1                         9.2    7.99  )-----------( 377      ( 30 Oct 2019 07:33 )             30.8                         8.9    8.38  )-----------( 408      ( 29 Oct 2019 07:13 )             29.2     31 Oct 2019 08:58    141    |  108    |  15     ----------------------------<  206    4.3     |  28     |  1.00   30 Oct 2019 07:33    142    |  108    |  15     ----------------------------<  178    3.9     |  26     |  0.94   29 Oct 2019 07:13    142    |  108    |  13     ----------------------------<  180    3.9     |  28     |  0.89     Ca    8.9        31 Oct 2019 08:58  Ca    8.9        30 Oct 2019 07:33  Ca    8.2        29 Oct 2019 07:13    < from: TTE Echo Doppler w/o Cont (08.15.19 @ 16:33) >     EXAM:  ECHO TTE WO CON COMP W DOPPLR         PROCEDURE DATE:  08/15/2019        INTERPRETATION:  INDICATION: Atrial fibrillation    Blood Pressure 128/64    Height 187.9 cm     Weight 91 kg       BSA 2.18   sq m    Dimensions:    LA 3.7       Normal Values: 2.0 - 4.0 cm    Ao 3.6        Normal Values: 2.0 - 3.8 cm  SEPTUM 1.6       Normal Values: 0.6 - 1.2 cm  PWT 1.4       Normal Values: 0.6 - 1.1 cm  LVIDd 5.3         Normal Values: 3.0 - 5.6 cm  LVIDs 2.7         Normal Values: 1.8 - 4.0 cm      OBSERVATIONS:  Technically difficult study  Mitral Valve: normal, mild to moderate MR.  Aortic Valve/Aorta: Aortic valve is not well-visualized, likely normal   function.  Tricuspid Valve: normal with trace TR.  Pulmonic Valve: normal  Left Atrium: Enlarged  Right Atrium: normal  Left Ventricle: normal LV size and systolic function, estimated LVEF of   65%. Left ventricular hypertrophy  Right Ventricle: Grossly normal size and systolic function.  Pericardium/Pleura: normal, no significant pericardial effusion.  LV Diastolic Function: normal    Conclusion:   Technically difficult study  Normal left ventricular internal dimensions and systolic function,   estimated LVEF of 65%.   Grossly normal RV size and systolic function.   Left atrial enlargement  Aortic valve is not well-visualized, likely normal function.   Mild to moderate MR   Trace TR.    No significant pericardial effusion.      MIROSLAVA CASTAÑEDA   This document has been electronically signed. Aug 16 2019  8:25AM    < end of copied text >    < from: Xray Chest 1 View AP/PA (10.25.19 @ 21:02) >    EXAM:  XR CHEST AP OR PA 1V                          PROCEDURE DATE:  10/25/2019      INTERPRETATION:  History: Stroke    Portable chest x-ray is performed. Comparison is made to 10/14/2019.    The heart is not enlarged. The trachea is midline. Lungs are clear. The   osseous structures demonstrate degenerative changes.    Impression: No active disease. No change    DONALDO GARCIA M.D.,ATTENDING RADIOLOGIST  This document has been electronically signed. Oct 26 2019 10:03AM     < end of copied text >    < from: 12 Lead ECG (10.25.19 @ 19:55) >    Ventricular Rate 71 BPM    Atrial Rate 71 BPM    P-R Interval 140 ms    QRS Duration 84 ms    Q-T Interval 394 ms    QTC Calculation(Bezet) 428 ms    P Axis 71 degrees    R Axis 27 degrees    T Axis 103 degrees    Diagnosis Line Sinus rhythm with premature atrial complexes  ST & T wave abnormality, consider lateral ischemia  Confirmed by JOHN KU ([id number]) on 10/26/2019 11:06:02 AM    < end of copied text >

## 2019-10-31 NOTE — PROGRESS NOTE ADULT - PROBLEM SELECTOR PROBLEM 9
BPH (benign prostatic hyperplasia)
BPH (benign prostatic hyperplasia)
Need for prophylactic measure
BPH (benign prostatic hyperplasia)

## 2019-10-31 NOTE — PROGRESS NOTE ADULT - PROBLEM SELECTOR PLAN 7
- Chronic, Stable  - Continue home medication of losartan 25mg  - Continue to monitor
- Chronic, Stable  - HbA1C 8/1/19 was 8.3  - Patient does not recall what his regimen in rehab  - During his last admission, patient was well controlled on 8 units long acting lantus and 5 units humalog TID   - BG well controlled at this moment, will start on low insulin sliding scale
- Chronic, Stable  - HbA1C 7.4, improved from 8/1/19 was 8.3  - Patient does not recall what his regimen in rehab  - During his last admission, patient was well controlled on 8 units long acting lantus and 5 units humalog TID   - BG well controlled at this moment, will start on low insulin sliding scale
- Chronic, Stable  - HbA1C 8/1/19 was 8.3  - Patient does not recall what his regimen in rehab  - During his last admission, patient was well controlled on 8 units long acting lantus and 5 units humalog TID   - BG well controlled at this moment, will start on low insulin sliding scale
- Chronic, Stable  - Continue home medication of losartan 25mg  - Continue to monitor
- Chronic, Stable, Currently 123/72  - Continue home medication of losartan 25mg  - Continue to monitor

## 2019-10-31 NOTE — PROGRESS NOTE ADULT - PROBLEM SELECTOR PROBLEM 3
CAD (coronary artery disease)
Afib
CAD (coronary artery disease)
CAD (coronary artery disease)

## 2019-10-31 NOTE — DISCHARGE NOTE NURSING/CASE MANAGEMENT/SOCIAL WORK - NSDCPEPTSTRK_GEN_ALL_CORE
Stroke support groups for patients, families, and friends/Prescribed medications/Risk factors for stroke/Signs and symptoms of stroke/Stroke warning signs and symptoms/Stroke education booklet/Call 911 for stroke/Need for follow up after discharge

## 2019-11-01 PROCEDURE — G9005: CPT

## 2019-11-01 PROCEDURE — G9001: CPT

## 2019-11-14 NOTE — PATIENT PROFILE ADULT - NSPROIMPLANTSMEDDEV_GEN_A_NUR
85M h/o CAD s/p stents (2001, 2016), HTN, HLD, kidney cancer s/p R lap partial nephrectomy and RPLND 11/1, transferred from OSH w/ acute blood loss anemia due to gross hematuria None

## 2019-11-16 NOTE — CONSULT NOTE ADULT - PROBLEM SELECTOR RECOMMENDATION 4
Advanced care planning was discussed with patient and family.  Advanced care planning forms were reviewed and discussed.  Risks, benefits and alternatives of gastroenterologic procedures were discussed in detail and all questions were answered.    30 minutes spent. no

## 2019-12-10 NOTE — PROGRESS NOTE ADULT - PROBLEM SELECTOR PLAN 7
December 10, 2019       Re: Geo CHATMAN Kurt  : 2001         To whom it may concern:      Geo can wear his moccasins inside the home.  They have a sole.        Thank you,          Kristi Schuler RN, CNP                                       -h/x of perforated ulcer, s/p ex-lap omentopexy and plication in 5/2019  -monitor H/H closely   -patient on protonix 40mg and sucralfate 1g QID at home  - Continue protonix, sucralfate while in hospital -HbA1C 8/1/19 was 8.3  -patient is on glargine 24U qhs and lispro 3U prior to meals, and metformin 1000mg BID  - Lantus 8units at night, ISS while in hospital

## 2019-12-20 RX ORDER — CEFUROXIME AXETIL 250 MG
1 TABLET ORAL
Qty: 0 | Refills: 0 | DISCHARGE
Start: 2019-12-20

## 2019-12-23 ENCOUNTER — INPATIENT (INPATIENT)
Facility: HOSPITAL | Age: 49
LOS: 15 days | Discharge: NURSING, REHAB OR LONG-TERM CARE | DRG: 556 | End: 2020-01-08
Attending: INTERNAL MEDICINE | Admitting: INTERNAL MEDICINE
Payer: COMMERCIAL

## 2019-12-23 VITALS
HEIGHT: 74 IN | RESPIRATION RATE: 14 BRPM | TEMPERATURE: 98 F | OXYGEN SATURATION: 98 % | DIASTOLIC BLOOD PRESSURE: 98 MMHG | SYSTOLIC BLOOD PRESSURE: 183 MMHG | WEIGHT: 205.03 LBS | HEART RATE: 89 BPM

## 2019-12-23 DIAGNOSIS — I10 ESSENTIAL (PRIMARY) HYPERTENSION: ICD-10-CM

## 2019-12-23 DIAGNOSIS — I25.10 ATHEROSCLEROTIC HEART DISEASE OF NATIVE CORONARY ARTERY WITHOUT ANGINA PECTORIS: ICD-10-CM

## 2019-12-23 DIAGNOSIS — R29.898 OTHER SYMPTOMS AND SIGNS INVOLVING THE MUSCULOSKELETAL SYSTEM: ICD-10-CM

## 2019-12-23 DIAGNOSIS — I48.91 UNSPECIFIED ATRIAL FIBRILLATION: ICD-10-CM

## 2019-12-23 DIAGNOSIS — S98.912A COMPLETE TRAUMATIC AMPUTATION OF LEFT FOOT, LEVEL UNSPECIFIED, INITIAL ENCOUNTER: Chronic | ICD-10-CM

## 2019-12-23 DIAGNOSIS — N40.0 BENIGN PROSTATIC HYPERPLASIA WITHOUT LOWER URINARY TRACT SYMPTOMS: ICD-10-CM

## 2019-12-23 DIAGNOSIS — K25.5 CHRONIC OR UNSPECIFIED GASTRIC ULCER WITH PERFORATION: Chronic | ICD-10-CM

## 2019-12-23 DIAGNOSIS — Z98.890 OTHER SPECIFIED POSTPROCEDURAL STATES: Chronic | ICD-10-CM

## 2019-12-23 DIAGNOSIS — E11.9 TYPE 2 DIABETES MELLITUS WITHOUT COMPLICATIONS: ICD-10-CM

## 2019-12-23 LAB
ALBUMIN SERPL ELPH-MCNC: 3.3 G/DL — SIGNIFICANT CHANGE UP (ref 3.3–5)
ALP SERPL-CCNC: 170 U/L — HIGH (ref 40–120)
ALT FLD-CCNC: 27 U/L — SIGNIFICANT CHANGE UP (ref 12–78)
ANION GAP SERPL CALC-SCNC: 8 MMOL/L — SIGNIFICANT CHANGE UP (ref 5–17)
APTT BLD: 32.1 SEC — SIGNIFICANT CHANGE UP (ref 28.5–37)
AST SERPL-CCNC: 18 U/L — SIGNIFICANT CHANGE UP (ref 15–37)
BASOPHILS # BLD AUTO: 0.03 K/UL — SIGNIFICANT CHANGE UP (ref 0–0.2)
BASOPHILS NFR BLD AUTO: 0.3 % — SIGNIFICANT CHANGE UP (ref 0–2)
BILIRUB SERPL-MCNC: 0.2 MG/DL — SIGNIFICANT CHANGE UP (ref 0.2–1.2)
BUN SERPL-MCNC: 17 MG/DL — SIGNIFICANT CHANGE UP (ref 7–23)
CALCIUM SERPL-MCNC: 9.3 MG/DL — SIGNIFICANT CHANGE UP (ref 8.5–10.1)
CHLORIDE SERPL-SCNC: 102 MMOL/L — SIGNIFICANT CHANGE UP (ref 96–108)
CO2 SERPL-SCNC: 27 MMOL/L — SIGNIFICANT CHANGE UP (ref 22–31)
CREAT SERPL-MCNC: 1.1 MG/DL — SIGNIFICANT CHANGE UP (ref 0.5–1.3)
EOSINOPHIL # BLD AUTO: 0.17 K/UL — SIGNIFICANT CHANGE UP (ref 0–0.5)
EOSINOPHIL NFR BLD AUTO: 1.9 % — SIGNIFICANT CHANGE UP (ref 0–6)
ETHANOL SERPL-MCNC: <10 MG/DL — SIGNIFICANT CHANGE UP (ref 0–10)
GLUCOSE SERPL-MCNC: 378 MG/DL — HIGH (ref 70–99)
HCT VFR BLD CALC: 35.6 % — LOW (ref 39–50)
HGB BLD-MCNC: 10.9 G/DL — LOW (ref 13–17)
IMM GRANULOCYTES NFR BLD AUTO: 0.6 % — SIGNIFICANT CHANGE UP (ref 0–1.5)
INR BLD: 1.03 RATIO — SIGNIFICANT CHANGE UP (ref 0.88–1.16)
LYMPHOCYTES # BLD AUTO: 1.58 K/UL — SIGNIFICANT CHANGE UP (ref 1–3.3)
LYMPHOCYTES # BLD AUTO: 17.7 % — SIGNIFICANT CHANGE UP (ref 13–44)
MCHC RBC-ENTMCNC: 25.6 PG — LOW (ref 27–34)
MCHC RBC-ENTMCNC: 30.6 GM/DL — LOW (ref 32–36)
MCV RBC AUTO: 83.6 FL — SIGNIFICANT CHANGE UP (ref 80–100)
MONOCYTES # BLD AUTO: 0.83 K/UL — SIGNIFICANT CHANGE UP (ref 0–0.9)
MONOCYTES NFR BLD AUTO: 9.3 % — SIGNIFICANT CHANGE UP (ref 2–14)
NEUTROPHILS # BLD AUTO: 6.26 K/UL — SIGNIFICANT CHANGE UP (ref 1.8–7.4)
NEUTROPHILS NFR BLD AUTO: 70.2 % — SIGNIFICANT CHANGE UP (ref 43–77)
NRBC # BLD: 0 /100 WBCS — SIGNIFICANT CHANGE UP (ref 0–0)
PLATELET # BLD AUTO: 334 K/UL — SIGNIFICANT CHANGE UP (ref 150–400)
POTASSIUM SERPL-MCNC: 3.9 MMOL/L — SIGNIFICANT CHANGE UP (ref 3.5–5.3)
POTASSIUM SERPL-SCNC: 3.9 MMOL/L — SIGNIFICANT CHANGE UP (ref 3.5–5.3)
PROT SERPL-MCNC: 7.6 G/DL — SIGNIFICANT CHANGE UP (ref 6–8.3)
PROTHROM AB SERPL-ACNC: 11.7 SEC — SIGNIFICANT CHANGE UP (ref 10–12.9)
RBC # BLD: 4.26 M/UL — SIGNIFICANT CHANGE UP (ref 4.2–5.8)
RBC # FLD: 16.4 % — HIGH (ref 10.3–14.5)
SODIUM SERPL-SCNC: 137 MMOL/L — SIGNIFICANT CHANGE UP (ref 135–145)
WBC # BLD: 8.92 K/UL — SIGNIFICANT CHANGE UP (ref 3.8–10.5)
WBC # FLD AUTO: 8.92 K/UL — SIGNIFICANT CHANGE UP (ref 3.8–10.5)

## 2019-12-23 PROCEDURE — 70496 CT ANGIOGRAPHY HEAD: CPT | Mod: 26

## 2019-12-23 PROCEDURE — 71045 X-RAY EXAM CHEST 1 VIEW: CPT | Mod: 26

## 2019-12-23 PROCEDURE — 70498 CT ANGIOGRAPHY NECK: CPT | Mod: 26

## 2019-12-23 PROCEDURE — 93010 ELECTROCARDIOGRAM REPORT: CPT

## 2019-12-23 PROCEDURE — 99285 EMERGENCY DEPT VISIT HI MDM: CPT

## 2019-12-23 RX ORDER — DEXTROSE 50 % IN WATER 50 %
12.5 SYRINGE (ML) INTRAVENOUS ONCE
Refills: 0 | Status: DISCONTINUED | OUTPATIENT
Start: 2019-12-23 | End: 2020-01-08

## 2019-12-23 RX ORDER — DEXTROSE 50 % IN WATER 50 %
25 SYRINGE (ML) INTRAVENOUS ONCE
Refills: 0 | Status: DISCONTINUED | OUTPATIENT
Start: 2019-12-23 | End: 2020-01-08

## 2019-12-23 RX ORDER — INSULIN LISPRO 100/ML
VIAL (ML) SUBCUTANEOUS AT BEDTIME
Refills: 0 | Status: DISCONTINUED | OUTPATIENT
Start: 2019-12-23 | End: 2019-12-26

## 2019-12-23 RX ORDER — SUCRALFATE 1 G
1 TABLET ORAL
Refills: 0 | Status: DISCONTINUED | OUTPATIENT
Start: 2019-12-23 | End: 2020-01-08

## 2019-12-23 RX ORDER — GLUCAGON INJECTION, SOLUTION 0.5 MG/.1ML
1 INJECTION, SOLUTION SUBCUTANEOUS ONCE
Refills: 0 | Status: DISCONTINUED | OUTPATIENT
Start: 2019-12-23 | End: 2020-01-08

## 2019-12-23 RX ORDER — ASPIRIN/CALCIUM CARB/MAGNESIUM 324 MG
325 TABLET ORAL ONCE
Refills: 0 | Status: COMPLETED | OUTPATIENT
Start: 2019-12-23 | End: 2019-12-23

## 2019-12-23 RX ORDER — METFORMIN HYDROCHLORIDE 850 MG/1
1000 TABLET ORAL
Refills: 0 | Status: DISCONTINUED | OUTPATIENT
Start: 2019-12-23 | End: 2019-12-23

## 2019-12-23 RX ORDER — PANTOPRAZOLE SODIUM 20 MG/1
40 TABLET, DELAYED RELEASE ORAL
Refills: 0 | Status: DISCONTINUED | OUTPATIENT
Start: 2019-12-23 | End: 2020-01-08

## 2019-12-23 RX ORDER — SODIUM CHLORIDE 9 MG/ML
1000 INJECTION, SOLUTION INTRAVENOUS
Refills: 0 | Status: DISCONTINUED | OUTPATIENT
Start: 2019-12-23 | End: 2020-01-08

## 2019-12-23 RX ORDER — TAMSULOSIN HYDROCHLORIDE 0.4 MG/1
0.4 CAPSULE ORAL AT BEDTIME
Refills: 0 | Status: DISCONTINUED | OUTPATIENT
Start: 2019-12-23 | End: 2020-01-08

## 2019-12-23 RX ORDER — DEXTROSE 50 % IN WATER 50 %
15 SYRINGE (ML) INTRAVENOUS ONCE
Refills: 0 | Status: DISCONTINUED | OUTPATIENT
Start: 2019-12-23 | End: 2020-01-08

## 2019-12-23 RX ORDER — CLOPIDOGREL BISULFATE 75 MG/1
75 TABLET, FILM COATED ORAL DAILY
Refills: 0 | Status: DISCONTINUED | OUTPATIENT
Start: 2019-12-24 | End: 2020-01-08

## 2019-12-23 RX ORDER — INSULIN LISPRO 100/ML
VIAL (ML) SUBCUTANEOUS
Refills: 0 | Status: DISCONTINUED | OUTPATIENT
Start: 2019-12-23 | End: 2019-12-26

## 2019-12-23 RX ORDER — LOSARTAN POTASSIUM 100 MG/1
25 TABLET, FILM COATED ORAL DAILY
Refills: 0 | Status: DISCONTINUED | OUTPATIENT
Start: 2019-12-23 | End: 2020-01-08

## 2019-12-23 RX ORDER — ISOSORBIDE MONONITRATE 60 MG/1
30 TABLET, EXTENDED RELEASE ORAL DAILY
Refills: 0 | Status: DISCONTINUED | OUTPATIENT
Start: 2019-12-23 | End: 2020-01-08

## 2019-12-23 RX ORDER — APIXABAN 2.5 MG/1
5 TABLET, FILM COATED ORAL EVERY 12 HOURS
Refills: 0 | Status: DISCONTINUED | OUTPATIENT
Start: 2019-12-23 | End: 2020-01-08

## 2019-12-23 RX ORDER — BETHANECHOL CHLORIDE 25 MG
25 TABLET ORAL THREE TIMES A DAY
Refills: 0 | Status: DISCONTINUED | OUTPATIENT
Start: 2019-12-23 | End: 2020-01-08

## 2019-12-23 RX ORDER — ATORVASTATIN CALCIUM 80 MG/1
80 TABLET, FILM COATED ORAL AT BEDTIME
Refills: 0 | Status: DISCONTINUED | OUTPATIENT
Start: 2019-12-23 | End: 2020-01-08

## 2019-12-23 RX ORDER — METOPROLOL TARTRATE 50 MG
25 TABLET ORAL EVERY 12 HOURS
Refills: 0 | Status: DISCONTINUED | OUTPATIENT
Start: 2019-12-23 | End: 2020-01-08

## 2019-12-23 RX ORDER — INSULIN LISPRO 100/ML
7 VIAL (ML) SUBCUTANEOUS
Qty: 0 | Refills: 0 | DISCHARGE

## 2019-12-23 RX ORDER — ACETAMINOPHEN 500 MG
650 TABLET ORAL EVERY 6 HOURS
Refills: 0 | Status: DISCONTINUED | OUTPATIENT
Start: 2019-12-23 | End: 2020-01-08

## 2019-12-23 RX ADMIN — TAMSULOSIN HYDROCHLORIDE 0.4 MILLIGRAM(S): 0.4 CAPSULE ORAL at 21:40

## 2019-12-23 RX ADMIN — Medication 1 GRAM(S): at 23:12

## 2019-12-23 RX ADMIN — ATORVASTATIN CALCIUM 80 MILLIGRAM(S): 80 TABLET, FILM COATED ORAL at 23:12

## 2019-12-23 RX ADMIN — APIXABAN 5 MILLIGRAM(S): 2.5 TABLET, FILM COATED ORAL at 21:40

## 2019-12-23 RX ADMIN — Medication 25 MILLIGRAM(S): at 23:12

## 2019-12-23 RX ADMIN — Medication 325 MILLIGRAM(S): at 20:10

## 2019-12-23 NOTE — ED ADULT NURSE NOTE - OBJECTIVE STATEMENT
48 y/o male comes in AX[facility] complaining that he was having right arm weakness and slurred speech. Patient states he was visiting his friend at [facility] when he suddenly became dizzy and didn't know where he was. States this has happened in the past. Patient answering all questions appropriately. Patient speech slurred at times. Family at bedside. Patient states he has a headache. Denies chest pain, SOB, nausea or vomiting. Plan of care discussed with patient. Comfort and safety maintained. Will continue to monitor.

## 2019-12-23 NOTE — ED ADULT NURSE NOTE - NSIMPLEMENTINTERV_GEN_ALL_ED
Implemented All Fall with Harm Risk Interventions:  [place] to call system. Call bell, personal items and telephone within reach. Instruct patient to call for assistance. Room bathroom lighting operational. Non-slip footwear when patient is off stretcher. Physically safe environment: no spills, clutter or unnecessary equipment. Stretcher in lowest position, wheels locked, appropriate side rails in place. Provide visual cue, wrist band, yellow gown, etc. Monitor gait and stability. Monitor for mental status changes and reorient to person, place, and time. Review medications for side effects contributing to fall risk. Reinforce activity limits and safety measures with patient and family. Provide visual clues: red socks.

## 2019-12-23 NOTE — ED ADULT NURSE NOTE - CHPI ED NUR SYMPTOMS NEG
no fever/no nausea/no numbness/no change in level of consciousness/no vomiting/no loss of consciousness

## 2019-12-23 NOTE — ED PROVIDER NOTE - PROGRESS NOTE DETAILS
Pt seen by Dr Jon, not lytic candidate. not likely acute CVA, will work up pt with MRI , TBA med Dw Dr D Perlman, pts MD is not covered by him - admit to on call. Louie Young([facility]), accepts admit to tele

## 2019-12-23 NOTE — PROGRESS NOTE ADULT - SUBJECTIVE AND OBJECTIVE BOX
neuro cons dict.  presented with right sided weakness  off ac for couple of days  ct head, cta of head and neck  resume ac.  echo.  brain mri without contrast.  PT.  SPEECH

## 2019-12-23 NOTE — ED PROVIDER NOTE - ENMT, MLM
Airway patent, Nasal mucosa clear. Mouth with normal mucosa. Throat has no vesicles, no oropharyngeal exudates and uvula is midline. non-toxic, well appearing.

## 2019-12-23 NOTE — ED ADULT NURSE REASSESSMENT NOTE - NS ED NURSE REASSESS COMMENT FT1
no signs of difficulty swallowing. Patient awake alert and oriented. Patient states he is feeling better but is happy to be admitted. States his speech is improved and he has feeling back to right arm. Plan of care discussed with patient. Comfort and safety maintained. Will continue to monitor.

## 2019-12-23 NOTE — H&P ADULT - HISTORY OF PRESENT ILLNESS
50 yo M with hx CVA in past, is on Eliquis (unclear what time last took), also h/o AF, HTN, hyperthyroid, DM, BPH - came in c/o sudden onset R sided facial droop and weakness to R side of body. Pt with hx same symptoms in August - cta / MRI were negative at that time. Pt now has recurrent symptoms -- onset ~ 30 min PTA. No cp/sob/palp. Pt states feels overall weak. No fall/ recent trauma. No aggravating/alleviating factors. No other injuries or other complaints.

## 2019-12-23 NOTE — H&P ADULT - PROBLEM SELECTOR PLAN 1
R/O CVA  Admit to tele  Continue eliquis and plavix  neuro check q4h  MRI brain  2d echo  lipid profile  physical therapy  speech and swallow eval  neuro consult  Further work-up/management pending clinical course.

## 2019-12-23 NOTE — H&P ADULT - EXTREMITIES
"Chief Complaint   Patient presents with     Hospital F/U     Hypertension       Initial /86 mmHg  Pulse 88  Temp(Src) 97.9  F (36.6  C) (Oral)  Resp 16  Ht 1.6 m (5' 3\")  Wt 84.097 kg (185 lb 6.4 oz)  BMI 32.85 kg/m2  SpO2 99% Estimated body mass index is 32.85 kg/(m^2) as calculated from the following:    Height as of this encounter: 1.6 m (5' 3\").    Weight as of this encounter: 84.097 kg (185 lb 6.4 oz).  BP completed using cuff size: danyel Salinas MA      "
No cyanosis, clubbing or edema

## 2019-12-23 NOTE — ED PROVIDER NOTE - CLINICAL SUMMARY MEDICAL DECISION MAKING FREE TEXT BOX
50 yo M with mult med issues on Eliquis (not lytic candidate) pw r sided weakness with complete facial dec movement - check labs, CT, neuro , reeval

## 2019-12-23 NOTE — ED PROVIDER NOTE - OBJECTIVE STATEMENT
48 yo M with hx CVA in past, is on Eliquis (unclear what time last took) - but co sudden onset R sided facial droop and weakness to R side of body. pt with hx same sx in aug - cta / MRI was neg. Pt now again with sx, onset ~ 30 min pta. no cp/sob/palp. pt states feels overall weak. no other recent sx. No fall/ recent trauma. no agg/allev factors. No other inj or co.

## 2019-12-23 NOTE — ED PROVIDER NOTE - CARE PLAN
Principal Discharge DX:	Weakness of right upper extremity  Goal:	ro CVA  Secondary Diagnosis:	Facial droop

## 2019-12-23 NOTE — ED ADULT TRIAGE NOTE - CHIEF COMPLAINT QUOTE
"I was visiting my friend and my right arm went numb."  about 30 minutes ago pt had sudden onset of numbness to right arm and leg with headache, pt has facial droop

## 2019-12-24 LAB
ANION GAP SERPL CALC-SCNC: 7 MMOL/L — SIGNIFICANT CHANGE UP (ref 5–17)
BUN SERPL-MCNC: 15 MG/DL — SIGNIFICANT CHANGE UP (ref 7–23)
CALCIUM SERPL-MCNC: 8.3 MG/DL — LOW (ref 8.5–10.1)
CHLORIDE SERPL-SCNC: 107 MMOL/L — SIGNIFICANT CHANGE UP (ref 96–108)
CHOLEST SERPL-MCNC: 110 MG/DL — SIGNIFICANT CHANGE UP (ref 10–199)
CO2 SERPL-SCNC: 25 MMOL/L — SIGNIFICANT CHANGE UP (ref 22–31)
CREAT SERPL-MCNC: 0.97 MG/DL — SIGNIFICANT CHANGE UP (ref 0.5–1.3)
GLUCOSE SERPL-MCNC: 245 MG/DL — HIGH (ref 70–99)
HBA1C BLD-MCNC: 8.4 % — HIGH (ref 4–5.6)
HCT VFR BLD CALC: 31.4 % — LOW (ref 39–50)
HDLC SERPL-MCNC: 50 MG/DL — SIGNIFICANT CHANGE UP
HGB BLD-MCNC: 9.7 G/DL — LOW (ref 13–17)
LIPID PNL WITH DIRECT LDL SERPL: 43 MG/DL — SIGNIFICANT CHANGE UP
MAGNESIUM SERPL-MCNC: 2.1 MG/DL — SIGNIFICANT CHANGE UP (ref 1.6–2.6)
MCHC RBC-ENTMCNC: 25.8 PG — LOW (ref 27–34)
MCHC RBC-ENTMCNC: 30.9 GM/DL — LOW (ref 32–36)
MCV RBC AUTO: 83.5 FL — SIGNIFICANT CHANGE UP (ref 80–100)
NRBC # BLD: 0 /100 WBCS — SIGNIFICANT CHANGE UP (ref 0–0)
PLATELET # BLD AUTO: 253 K/UL — SIGNIFICANT CHANGE UP (ref 150–400)
POTASSIUM SERPL-MCNC: 3.7 MMOL/L — SIGNIFICANT CHANGE UP (ref 3.5–5.3)
POTASSIUM SERPL-SCNC: 3.7 MMOL/L — SIGNIFICANT CHANGE UP (ref 3.5–5.3)
RBC # BLD: 3.76 M/UL — LOW (ref 4.2–5.8)
RBC # FLD: 16.3 % — HIGH (ref 10.3–14.5)
SODIUM SERPL-SCNC: 139 MMOL/L — SIGNIFICANT CHANGE UP (ref 135–145)
TOTAL CHOLESTEROL/HDL RATIO MEASUREMENT: 2.2 RATIO — LOW (ref 3.4–9.6)
TRIGL SERPL-MCNC: 83 MG/DL — SIGNIFICANT CHANGE UP (ref 10–149)
WBC # BLD: 6.81 K/UL — SIGNIFICANT CHANGE UP (ref 3.8–10.5)
WBC # FLD AUTO: 6.81 K/UL — SIGNIFICANT CHANGE UP (ref 3.8–10.5)

## 2019-12-24 PROCEDURE — 70551 MRI BRAIN STEM W/O DYE: CPT | Mod: 26

## 2019-12-24 PROCEDURE — 93306 TTE W/DOPPLER COMPLETE: CPT | Mod: 26

## 2019-12-24 PROCEDURE — 99222 1ST HOSP IP/OBS MODERATE 55: CPT

## 2019-12-24 PROCEDURE — 99232 SBSQ HOSP IP/OBS MODERATE 35: CPT

## 2019-12-24 RX ORDER — CEFUROXIME AXETIL 250 MG
250 TABLET ORAL EVERY 12 HOURS
Refills: 0 | Status: COMPLETED | OUTPATIENT
Start: 2019-12-24 | End: 2019-12-29

## 2019-12-24 RX ADMIN — Medication 3: at 17:01

## 2019-12-24 RX ADMIN — Medication 1 GRAM(S): at 05:01

## 2019-12-24 RX ADMIN — Medication 1 GRAM(S): at 17:04

## 2019-12-24 RX ADMIN — Medication 3: at 08:08

## 2019-12-24 RX ADMIN — APIXABAN 5 MILLIGRAM(S): 2.5 TABLET, FILM COATED ORAL at 17:02

## 2019-12-24 RX ADMIN — Medication 1 GRAM(S): at 22:21

## 2019-12-24 RX ADMIN — Medication 1 GRAM(S): at 11:26

## 2019-12-24 RX ADMIN — Medication 25 MILLIGRAM(S): at 17:04

## 2019-12-24 RX ADMIN — ISOSORBIDE MONONITRATE 30 MILLIGRAM(S): 60 TABLET, EXTENDED RELEASE ORAL at 11:26

## 2019-12-24 RX ADMIN — Medication 250 MILLIGRAM(S): at 17:03

## 2019-12-24 RX ADMIN — PANTOPRAZOLE SODIUM 40 MILLIGRAM(S): 20 TABLET, DELAYED RELEASE ORAL at 05:02

## 2019-12-24 RX ADMIN — Medication 25 MILLIGRAM(S): at 12:45

## 2019-12-24 RX ADMIN — Medication 1 MILLIGRAM(S): at 11:32

## 2019-12-24 RX ADMIN — CLOPIDOGREL BISULFATE 75 MILLIGRAM(S): 75 TABLET, FILM COATED ORAL at 11:27

## 2019-12-24 RX ADMIN — APIXABAN 5 MILLIGRAM(S): 2.5 TABLET, FILM COATED ORAL at 05:01

## 2019-12-24 RX ADMIN — Medication 5 MILLIGRAM(S): at 05:01

## 2019-12-24 RX ADMIN — Medication 3: at 11:35

## 2019-12-24 RX ADMIN — Medication 25 MILLIGRAM(S): at 05:01

## 2019-12-24 RX ADMIN — ATORVASTATIN CALCIUM 80 MILLIGRAM(S): 80 TABLET, FILM COATED ORAL at 22:21

## 2019-12-24 RX ADMIN — TAMSULOSIN HYDROCHLORIDE 0.4 MILLIGRAM(S): 0.4 CAPSULE ORAL at 22:21

## 2019-12-24 RX ADMIN — Medication 25 MILLIGRAM(S): at 22:21

## 2019-12-24 NOTE — BEHAVIORAL HEALTH ASSESSMENT NOTE - SUMMARY
48 yo SWM, with no prior psychiatric hospitalizations or treatment, domiciled, disabled, no reported history of drug or alcohol abuse, with hx CVA in past, is on Eliquis (unclear what time last took), also h/o AF, HTN, hyperthyroid, DM, BPH - came in c/o sudden onset R sided facial droop and weakness to R side of body.   Patient was recently discharged from [facility] and two days later he developed similar symptoms. The suspicion of malingering was raised due to lack of any evidence of CVA in the test. Currently patient is superficially cooperative, denying any symptoms of psychosis, sunitha, depression or anxiety.    IMP: Adjustment disorder, r/o malingering vs factitious disorder

## 2019-12-24 NOTE — SWALLOW BEDSIDE ASSESSMENT ADULT - SWALLOW EVAL: RECOMMENDED FEEDING/EATING TECHNIQUES
allow for swallow between intakes/small sips/bites/position upright (90 degrees)/oral hygiene/maintain upright posture during/after eating for 30 mins

## 2019-12-24 NOTE — PROGRESS NOTE ADULT - SUBJECTIVE AND OBJECTIVE BOX
Neurology Follow up note    SARAH MORRISON49yMale    HPI:  48 yo M with hx CVA in past, is on Eliquis (unclear what time last took), also h/o AF, HTN, hyperthyroid, DM, BPH - came in c/o sudden onset R sided facial droop and weakness to R side of body. Pt with hx same symptoms in August - cta / MRI were negative at that time. Pt now has recurrent symptoms -- onset ~ 30 min PTA. No cp/sob/palp. Pt states feels overall weak. No fall/ recent trauma. No aggravating/alleviating factors. No other injuries or other complaints. (23 Dec 2019 22:25)      Interval History - my right arm weakness improved but leg is still weak    Patient is seen, chart was reviewed and case was discussed with the treatment team.  Pt is not in any distress.   Lying on bed comfortably.   No events reported overnight.   No clinical seizure was reported.  Sitting on chair bed comfortably.    is at bedside.    Vital Signs Last 24 Hrs  T(C): 36.6 (24 Dec 2019 15:27), Max: 36.9 (23 Dec 2019 22:12)  T(F): 97.8 (24 Dec 2019 15:27), Max: 98.4 (23 Dec 2019 22:12)  HR: 65 (24 Dec 2019 15:27) (53 - 80)  BP: 146/76 (24 Dec 2019 15:27) (109/69 - 146/76)  BP(mean): --  RR: 16 (24 Dec 2019 15:27) (14 - 18)  SpO2: 96% (24 Dec 2019 15:27) (91% - 96%)        REVIEW OF SYSTEMS:    Constitutional: No fever, weight loss or fatigue  Eyes: No eye pain, visual disturbances, or discharge  ENT:  No difficulty hearing, tinnitus, vertigo; No sinus or throat pain  Neck: No pain or stiffness  Respiratory: No cough, wheezing, chills or hemoptysis  Cardiovascular: No chest pain, palpitations, shortness of breath,   Gastrointestinal: No abdominal or epigastric pain.   Genitourinary: No dysuria, frequency, hematuria or incontinence  Neurological: No headaches, memory loss, loss of strength, numbness or tremors  Psychiatric: No depression, anxiety, mood swings or difficulty sleeping  Musculoskeletal: No joint pain or swelling; No muscle, back or extremity pain  Skin: No itching, burning, rashes or lesions   Lymph Nodes: No enlarged glands  Endocrine: No heat or cold intolerance;   Allergy and Immunologic: No hives or eczema    On Neurological Examination:    Mental Status - Pt is alert, awake, oriented X3. Follows commands well and able to answer questions appropriately      Speech -  mild dysarthria ?    Cranial Nerves - Pupils 3 mm equal and reactive to light, extraocular eye movements intact. Pt has no visual field deficit.  Pt has no  facial asymmetry. Facial sensation is intact.Tongue - is in midline.    Muscle tone - is normal all over,      Motor Exam - RUE 5.5; RLE 2/5  LUE 5/5; LLE 4+/5    Sensory Exam Pt withdraws all extremities equally on stimulation.        coordination:    Finger to nose: normal    Deep tendon Reflexes - 2 plus all over.        Romberg - Negative.    Neck Supple -  Yes.     MEDICATIONS    acetaminophen   Tablet .. 650 milliGRAM(s) Oral every 6 hours PRN  apixaban 5 milliGRAM(s) Oral every 12 hours  atorvastatin 80 milliGRAM(s) Oral at bedtime  bethanechol 25 milliGRAM(s) Oral three times a day  cefuroxime   Tablet 250 milliGRAM(s) Oral every 12 hours  clopidogrel Tablet 75 milliGRAM(s) Oral daily  dextrose 40% Gel 15 Gram(s) Oral once PRN  dextrose 5%. 1000 milliLiter(s) IV Continuous <Continuous>  dextrose 50% Injectable 12.5 Gram(s) IV Push once  dextrose 50% Injectable 25 Gram(s) IV Push once  dextrose 50% Injectable 25 Gram(s) IV Push once  glucagon  Injectable 1 milliGRAM(s) IntraMuscular once PRN  insulin lispro (HumaLOG) corrective regimen sliding scale   SubCutaneous three times a day before meals  insulin lispro (HumaLOG) corrective regimen sliding scale   SubCutaneous at bedtime  isosorbide   mononitrate ER Tablet (IMDUR) 30 milliGRAM(s) Oral daily  losartan 25 milliGRAM(s) Oral daily  methimazole 10 milliGRAM(s) Oral daily  metoprolol tartrate 25 milliGRAM(s) Oral every 12 hours  pantoprazole    Tablet 40 milliGRAM(s) Oral before breakfast  predniSONE   Tablet 5 milliGRAM(s) Oral daily  sucralfate 1 Gram(s) Oral four times a day  tamsulosin 0.4 milliGRAM(s) Oral at bedtime      Allergies    fish (Hives)  No Known Drug Allergies    Intolerances        LABS:  CBC Full  -  ( 24 Dec 2019 06:19 )  WBC Count : 6.81 K/uL  RBC Count : 3.76 M/uL  Hemoglobin : 9.7 g/dL  Hematocrit : 31.4 %  Platelet Count - Automated : 253 K/uL  Mean Cell Volume : 83.5 fl  Mean Cell Hemoglobin : 25.8 pg   x  Auto Basophil % : x      12-24    139  |  107  |  15  ----------------------------<  245<H>  3.7   |  25  |  0.97    Ca    8.3<L>      24 Dec 2019 06:19  Mg     2.1     12-24    TPro  7.6  /  Alb  3.3  /  TBili  0.2  /  DBili  x   /  AST  18  /  ALT  27  /  AlkPhos  170<H>  12-23    Hemoglobin A1C: Hemoglobin A1C, Whole Blood: 8.4 % (12-24 @ 07:53)    Lipid Panel 12-24 @ 07:56  Total Cholesterol, Serum 110  LDL 43  Triglycerides 83    Vitamin B12     RADIOLOGY    ASSESSMENT AND PLAN:      PRESENTED WITH RIGHT SIDED WEAKNESS/SLURRED SPEECH  WITH MRI OF THE HEAD SHOWING NO ACUTE CVA ? CONVERSION DISORDER  SIMILAR PRESENTATION TWICE BEFORE, RECEIVED TPA INTIALLY.  EEG WAS ALSO UNREMARKABLE.  STABLE CEREBRAL ANEURYSM  AF        RESUME AC.  PSYCH EVAL FOR CONVERSION DISORDER  Physical therapy evaluation.  OOB to chair/ambulation with assistance only.  Pain is accessed and addressed.  Would continue to follow.

## 2019-12-24 NOTE — BEHAVIORAL HEALTH ASSESSMENT NOTE - NSBHCHARTREVIEWLAB_PSY_A_CORE FT
9.7    6.81  )-----------( 253      ( 24 Dec 2019 06:19 )             31.4   12-24    139  |  107  |  15  ----------------------------<  245<H>  3.7   |  25  |  0.97    Ca    8.3<L>      24 Dec 2019 06:19  Mg     2.1     12-24    TPro  7.6  /  Alb  3.3  /  TBili  0.2  /  DBili  x   /  AST  18  /  ALT  27  /  AlkPhos  170<H>  12-23

## 2019-12-24 NOTE — PROGRESS NOTE ADULT - SUBJECTIVE AND OBJECTIVE BOX
Patient is a 49y old  Male who presents with a chief complaint of R sided numbness (24 Dec 2019 09:30)      INTERVAL HPI/OVERNIGHT EVENTS: Patient seen and examined. NAD. No complaints.    Vital Signs Last 24 Hrs  T(C): 36.8 (24 Dec 2019 11:27), Max: 36.9 (23 Dec 2019 22:12)  T(F): 98.2 (24 Dec 2019 11:27), Max: 98.4 (23 Dec 2019 22:12)  HR: 60 (24 Dec 2019 11:27) (53 - 89)  BP: 117/71 (24 Dec 2019 11:27) (109/69 - 183/98)  BP(mean): --  RR: 16 (24 Dec 2019 11:27) (14 - 18)  SpO2: 93% (24 Dec 2019 11:27) (91% - 98%)    12-24    139  |  107  |  15  ----------------------------<  245<H>  3.7   |  25  |  0.97    Ca    8.3<L>      24 Dec 2019 06:19  Mg     2.1     12-24    TPro  7.6  /  Alb  3.3  /  TBili  0.2  /  DBili  x   /  AST  18  /  ALT  27  /  AlkPhos  170<H>  12-23                          9.7    6.81  )-----------( 253      ( 24 Dec 2019 06:19 )             31.4     PT/INR - ( 23 Dec 2019 15:53 )   PT: 11.7 sec;   INR: 1.03 ratio         PTT - ( 23 Dec 2019 15:53 )  PTT:32.1 sec  CAPILLARY BLOOD GLUCOSE      POCT Blood Glucose.: 272 mg/dL (24 Dec 2019 07:41)  POCT Blood Glucose.: 224 mg/dL (23 Dec 2019 22:26)  POCT Blood Glucose.: 362 mg/dL (23 Dec 2019 15:37)              acetaminophen   Tablet .. 650 milliGRAM(s) Oral every 6 hours PRN  apixaban 5 milliGRAM(s) Oral every 12 hours  atorvastatin 80 milliGRAM(s) Oral at bedtime  bethanechol 25 milliGRAM(s) Oral three times a day  clopidogrel Tablet 75 milliGRAM(s) Oral daily  dextrose 40% Gel 15 Gram(s) Oral once PRN  dextrose 5%. 1000 milliLiter(s) IV Continuous <Continuous>  dextrose 50% Injectable 12.5 Gram(s) IV Push once  dextrose 50% Injectable 25 Gram(s) IV Push once  dextrose 50% Injectable 25 Gram(s) IV Push once  glucagon  Injectable 1 milliGRAM(s) IntraMuscular once PRN  insulin lispro (HumaLOG) corrective regimen sliding scale   SubCutaneous three times a day before meals  insulin lispro (HumaLOG) corrective regimen sliding scale   SubCutaneous at bedtime  isosorbide   mononitrate ER Tablet (IMDUR) 30 milliGRAM(s) Oral daily  losartan 25 milliGRAM(s) Oral daily  methimazole 10 milliGRAM(s) Oral daily  metoprolol tartrate 25 milliGRAM(s) Oral every 12 hours  pantoprazole    Tablet 40 milliGRAM(s) Oral before breakfast  predniSONE   Tablet 5 milliGRAM(s) Oral daily  sucralfate 1 Gram(s) Oral four times a day  tamsulosin 0.4 milliGRAM(s) Oral at bedtime              REVIEW OF SYSTEMS:  CONSTITUTIONAL: No fever, no weight loss, or no fatigue  NECK: No pain, no stiffness  RESPIRATORY: No cough, no wheezing, no chills, no hemoptysis, No shortness of breath  CARDIOVASCULAR: No chest pain, no palpitations, no dizziness, no leg swelling  GASTROINTESTINAL: No abdominal pain. No nausea, no vomiting, no hematemesis; No diarrhea, no constipation. No melena, no hematochezia.  GENITOURINARY: No dysuria, no frequency, no hematuria, no incontinence  NEUROLOGICAL: No headaches, no loss of strength, no numbness, no tremors  SKIN: No itching, no burning  MUSCULOSKELETAL: No joint pain, no swelling; No muscle, no back, no extremity pain  PSYCHIATRIC: No depression, no mood swings,   HEME/LYMPH: No easy bruising, no bleeding gums  ALLERY AND IMMUNOLOGIC: No hives       Consultant(s) Notes Reviewed:  [X] YES  [ ] NO    PHYSICAL EXAM:  GENERAL: NAD  HEAD:  Atraumatic, Normocephalic  EYES: EOMI, PERRLA, conjunctiva and sclera clear  ENMT: No tonsillar erythema, exudates, or enlargement; Moist mucous membranes  NECK: Supple, No JVD  NERVOUS SYSTEM:  right side 1+/5  CHEST/LUNG: Clear to auscultation bilaterally; No rales, rhonchi, wheezing,  HEART: Regular rate and rhythm  ABDOMEN: Soft, Nontender, Nondistended; Bowel sounds present  EXTREMITIES:  No clubbing, cyanosis, or edema  LYMPH: No lymphadenopathy noted  SKIN: No rashes      Advanced care planning discussed with patient/family [X] YES   [ ] NO    Advanced care planning discussed with patient/family. Advanced care planning forms reviewed/discussed/completed. 20 minutes spent.

## 2019-12-24 NOTE — SWALLOW BEDSIDE ASSESSMENT ADULT - ASR SWALLOW ASPIRATION MONITOR
change of breathing pattern/position upright (90Y)/fever/throat clearing/upper respiratory infection/gurgly voice/oral hygiene/pneumonia/cough

## 2019-12-24 NOTE — BEHAVIORAL HEALTH ASSESSMENT NOTE - HPI (INCLUDE ILLNESS QUALITY, SEVERITY, DURATION, TIMING, CONTEXT, MODIFYING FACTORS, ASSOCIATED SIGNS AND SYMPTOMS)
Patient seen, evaluated and chart reviewed. Patient is a 50 yo SWM, with no prior psychiatric hospitalizations or treatment, domiciled, disabled, no reported history of drug or alcohol abuse, with hx CVA in past, is on Eliquis (unclear what time last took), also h/o AF, HTN, hyperthyroid, DM, BPH - came in c/o sudden onset R sided facial droop and weakness to R side of body. Pt with hx same symptoms in August - cta / MRI were negative at that time. Pt now has recurrent symptoms -- onset ~ 30 min PTA. No cp/sob/palp. Pt states feels overall weak. No fall/ recent trauma. No aggravating/alleviating factors. No other injuries or other complaints.  Patient was recently discharged from [facility] and two days later he developed similar symptoms. The suspicion of malingering was raised due to lack of any evidence of CVA in the test. Currently patient is superficially cooperative, denying any symptoms of psychosis, sunitha, depression or anxiety.

## 2019-12-24 NOTE — PROGRESS NOTE ADULT - PROBLEM SELECTOR PLAN 1
R/O CVA  Continue eliquis and plavix  neuro check q4h  MRI brain  2d echo  lipid profile  physical therapy  speech and swallow eval  neuro consult  Further work-up/management pending clinical course.

## 2019-12-24 NOTE — BEHAVIORAL HEALTH ASSESSMENT NOTE - NSBHCHARTREVIEWIMAGING_PSY_A_CORE FT
< from: MR Head No Cont (12.24.19 @ 13:08) >    EXAM:  MR BRAIN                            PROCEDURE DATE:  12/24/2019          INTERPRETATION:  CLINICAL STATEMENT: Right-sided numbness    TECHNIQUE: MRI of the brain was performed without gadolinium.    COMPARISON: CT head 12/23/2019    FINDINGS:  There is no abnormal T2 prolongation signal in the white matter.    There is no acute parenchymal hemorrhage, parenchymal mass, mass effect or midline shift. There is no extra-axial fluid collection.  There is no hydrocephalus.  There is no acute infarct. Partial empty sella    Mucosal thickening paranasal sinuses. Partial opacification left mastoid air cells    IMPRESSION:  No acute intracranial hemorrhage or acute infarct.                BERKLEY BARNETT M.D., ATTENDING RADIOLOGIST  This document has been electronically signed. Dec 24 2019  1:15PM    < end of copied text >

## 2019-12-24 NOTE — PHYSICAL THERAPY INITIAL EVALUATION ADULT - PERTINENT HX OF CURRENT PROBLEM, REHAB EVAL
50 yo M with hx CVA in past, is on Eliquis (unclear what time last took) - but co sudden onset R sided facial droop and weakness to R side of body. pt with hx same sx in aug - cta / MRI was neg. Pt now again with sx, onset ~ 30 min pta. no cp/sob/palp. pt states feels overall weak. no other recent sx. No fall/ recent trauma. no agg/allev factors. No other inj or co.

## 2019-12-24 NOTE — BEHAVIORAL HEALTH ASSESSMENT NOTE - NSBHCHARTREVIEWINVESTIGATE_PSY_A_CORE FT
< from: 12 Lead ECG (12.23.19 @ 15:36) >    Ventricular Rate 86 BPM    Atrial Rate 86 BPM    P-R Interval 152 ms    QRS Duration 86 ms    Q-T Interval 370 ms    QTC Calculation(Bezet) 442 ms    P Axis 35 degrees    R Axis 6 degrees    T Axis -16 degrees    Diagnosis Line Normal sinus rhythm  Nonspecific ST abnormality  Confirmed by darien France ([id number]) on 12/24/2019 2:33:00 PM    < end of copied text >

## 2019-12-24 NOTE — CONSULT NOTE ADULT - SUBJECTIVE AND OBJECTIVE BOX
[facility] - Bolivar Carbajal, Brittany Gallegos, Zahra, Román Jacobs  Office Number: [phone]    Initial Consult Note    CHIEF COMPLAINT: Patient is a 49y old  Male who presents with a chief complaint of R sided numbness (23 Dec 2019 22:25)      HPI:  50 yo M with hx CVA in past, is on Eliquis (unclear what time last took), also h/o AF, HTN, hyperthyroid, DM, BPH - came in c/o sudden onset R sided facial droop and weakness to R side of body. Pt with hx same symptoms in August - cta / MRI were negative at that time. Pt now has recurrent symptoms -- onset ~ 30 min PTA. No cp/sob/palp. Pt states feels overall weak. No fall/ recent trauma. No aggravating/alleviating factors. No other injuries or other complaints. (23 Dec 2019 22:25)      PAST MEDICAL & SURGICAL HISTORY:  Cerebrovascular accident  CAD S/P percutaneous coronary angioplasty  Osteomyelitis: s/p debridement  History of non-ST elevation myocardial infarction (NSTEMI)  Pulmonary embolism  Perforated gastric ulcer: s/p emergent ex-lap omentopexy and plication 6/2019  BPH (benign prostatic hyperplasia)  Hypertension  Afib  Diabetes mellitus with no complication  Diabetes  Traumatic amputation of left foot, initial encounter  Perforated gastric ulcer  H/O abdominal surgery      SOCIAL HISTORY:  No tobacco, ethanol, or drug abuse.    FAMILY HISTORY:  FH: stroke: Father  FH: coronary artery disease: Father  FH: pulmonary embolism: Mother    No family history of acute MI or sudden cardiac death.    MEDICATIONS  (STANDING):  apixaban 5 milliGRAM(s) Oral every 12 hours  atorvastatin 80 milliGRAM(s) Oral at bedtime  bethanechol 25 milliGRAM(s) Oral three times a day  clopidogrel Tablet 75 milliGRAM(s) Oral daily  dextrose 5%. 1000 milliLiter(s) (50 mL/Hr) IV Continuous <Continuous>  dextrose 50% Injectable 12.5 Gram(s) IV Push once  dextrose 50% Injectable 25 Gram(s) IV Push once  dextrose 50% Injectable 25 Gram(s) IV Push once  insulin lispro (HumaLOG) corrective regimen sliding scale   SubCutaneous three times a day before meals  insulin lispro (HumaLOG) corrective regimen sliding scale   SubCutaneous at bedtime  isosorbide   mononitrate ER Tablet (IMDUR) 30 milliGRAM(s) Oral daily  losartan 25 milliGRAM(s) Oral daily  methimazole 10 milliGRAM(s) Oral daily  metoprolol tartrate 25 milliGRAM(s) Oral every 12 hours  pantoprazole    Tablet 40 milliGRAM(s) Oral before breakfast  predniSONE   Tablet 5 milliGRAM(s) Oral daily  sucralfate 1 Gram(s) Oral four times a day  tamsulosin 0.4 milliGRAM(s) Oral at bedtime    MEDICATIONS  (PRN):  acetaminophen   Tablet .. 650 milliGRAM(s) Oral every 6 hours PRN Temp greater or equal to 38C (100.4F), Mild Pain (1 - 3)  dextrose 40% Gel 15 Gram(s) Oral once PRN Blood Glucose LESS THAN 70 milliGRAM(s)/deciLiter  glucagon  Injectable 1 milliGRAM(s) IntraMuscular once PRN Glucose <70 milliGRAM(s)/deciLiter      Allergies    fish (Hives)  No Known Drug Allergies    Intolerances        REVIEW OF SYSTEMS:    CONSTITUTIONAL: +weakness, no fevers or chills  EYES/ENT: No visual changes;  No vertigo or throat pain   NECK: No pain or stiffness  RESPIRATORY: No cough, wheezing, hemoptysis; No shortness of breath  CARDIOVASCULAR: No chest pain or palpitations  GASTROINTESTINAL: No abdominal pain. No nausea, vomiting, or hematemesis; No diarrhea or constipation. No melena or hematochezia.  GENITOURINARY: No dysuria, frequency or hematuria  NEUROLOGICAL: No numbness or weakness  SKIN: No itching or rash  All other review of systems is negative unless indicated above    VITAL SIGNS:   Vital Signs Last 24 Hrs  T(C): 36.3 (24 Dec 2019 07:54), Max: 36.9 (23 Dec 2019 22:12)  T(F): 97.4 (24 Dec 2019 07:54), Max: 98.4 (23 Dec 2019 22:12)  HR: 64 (24 Dec 2019 07:54) (53 - 89)  BP: 134/74 (24 Dec 2019 07:54) (109/69 - 183/98)  BP(mean): --  RR: 16 (24 Dec 2019 07:54) (14 - 18)  SpO2: 91% (24 Dec 2019 07:54) (91% - 98%)    I&O's Summary      On Exam:    Constitutional: NAD, alert and oriented x 3, + poor dentition  Lungs:  Non-labored, breath sounds are clear bilaterally, No wheezing, rales or rhonchi  Cardiovascular: bradycardic, S1 and S2 positive.  No murmurs, rubs, gallops or clicks  Gastrointestinal: Bowel Sounds present, soft, nontender.   Lymph: trace edema at ankles, No cervical lymphadenopathy.  Neurological: Alert, right sided weakness  Skin: No rashes or ulcers   Psych:  Mood & affect appropriate.    LABS: All Labs Reviewed:                        9.7    6.81  )-----------( 253      ( 24 Dec 2019 06:19 )             31.4                         10.9   8.92  )-----------( 334      ( 23 Dec 2019 15:53 )             35.6     24 Dec 2019 06:19    139    |  107    |  15     ----------------------------<  245    3.7     |  25     |  0.97   23 Dec 2019 15:53    137    |  102    |  17     ----------------------------<  378    3.9     |  27     |  1.10     Ca    8.3        24 Dec 2019 06:19  Ca    9.3        23 Dec 2019 15:53  Mg     2.1       24 Dec 2019 06:19    TPro  7.6    /  Alb  3.3    /  TBili  0.2    /  DBili  x      /  AST  18     /  ALT  27     /  AlkPhos  170    23 Dec 2019 15:53    PT/INR - ( 23 Dec 2019 15:53 )   PT: 11.7 sec;   INR: 1.03 ratio         PTT - ( 23 Dec 2019 15:53 )  PTT:32.1 sec      Blood Culture:         RADIOLOGY:    EKG: no ekg avail  TELE: SB

## 2019-12-24 NOTE — BEHAVIORAL HEALTH ASSESSMENT NOTE - NSBHCHARTREVIEWVS_PSY_A_CORE FT
Vital Signs Last 24 Hrs  T(C): 36.6 (24 Dec 2019 15:27), Max: 36.9 (23 Dec 2019 22:12)  T(F): 97.8 (24 Dec 2019 15:27), Max: 98.4 (23 Dec 2019 22:12)  HR: 65 (24 Dec 2019 15:27) (53 - 80)  BP: 146/76 (24 Dec 2019 15:27) (109/69 - 146/76)  BP(mean): --  RR: 16 (24 Dec 2019 15:27) (14 - 18)  SpO2: 96% (24 Dec 2019 15:27) (91% - 96%)

## 2019-12-24 NOTE — SWALLOW BEDSIDE ASSESSMENT ADULT - ADDITIONAL RECOMMENDATIONS
Skilled ST services are not warranted at this time. Re-consult this department should any new concerns regarding the patient's speech/language/swallowing abilities arise.

## 2019-12-24 NOTE — BEHAVIORAL HEALTH ASSESSMENT NOTE - NSBHREFERDETAILS_PSY_A_CORE_FT
Patient has been in the hospital for several times recently with no objective evidence of underlying issue

## 2019-12-25 PROCEDURE — 99232 SBSQ HOSP IP/OBS MODERATE 35: CPT

## 2019-12-25 RX ADMIN — Medication 4: at 11:57

## 2019-12-25 RX ADMIN — LOSARTAN POTASSIUM 25 MILLIGRAM(S): 100 TABLET, FILM COATED ORAL at 05:41

## 2019-12-25 RX ADMIN — CLOPIDOGREL BISULFATE 75 MILLIGRAM(S): 75 TABLET, FILM COATED ORAL at 11:55

## 2019-12-25 RX ADMIN — APIXABAN 5 MILLIGRAM(S): 2.5 TABLET, FILM COATED ORAL at 17:12

## 2019-12-25 RX ADMIN — Medication 250 MILLIGRAM(S): at 17:12

## 2019-12-25 RX ADMIN — Medication 1 GRAM(S): at 05:41

## 2019-12-25 RX ADMIN — Medication 1 GRAM(S): at 23:52

## 2019-12-25 RX ADMIN — Medication 5 MILLIGRAM(S): at 05:42

## 2019-12-25 RX ADMIN — Medication 2: at 22:13

## 2019-12-25 RX ADMIN — Medication 3: at 17:12

## 2019-12-25 RX ADMIN — ISOSORBIDE MONONITRATE 30 MILLIGRAM(S): 60 TABLET, EXTENDED RELEASE ORAL at 11:55

## 2019-12-25 RX ADMIN — Medication 25 MILLIGRAM(S): at 13:55

## 2019-12-25 RX ADMIN — Medication 25 MILLIGRAM(S): at 05:41

## 2019-12-25 RX ADMIN — Medication 1 GRAM(S): at 17:11

## 2019-12-25 RX ADMIN — Medication 1 GRAM(S): at 11:55

## 2019-12-25 RX ADMIN — ATORVASTATIN CALCIUM 80 MILLIGRAM(S): 80 TABLET, FILM COATED ORAL at 22:14

## 2019-12-25 RX ADMIN — Medication 25 MILLIGRAM(S): at 17:12

## 2019-12-25 RX ADMIN — APIXABAN 5 MILLIGRAM(S): 2.5 TABLET, FILM COATED ORAL at 05:42

## 2019-12-25 RX ADMIN — TAMSULOSIN HYDROCHLORIDE 0.4 MILLIGRAM(S): 0.4 CAPSULE ORAL at 22:14

## 2019-12-25 RX ADMIN — Medication 250 MILLIGRAM(S): at 05:42

## 2019-12-25 RX ADMIN — Medication 25 MILLIGRAM(S): at 22:14

## 2019-12-25 RX ADMIN — PANTOPRAZOLE SODIUM 40 MILLIGRAM(S): 20 TABLET, DELAYED RELEASE ORAL at 05:42

## 2019-12-25 RX ADMIN — Medication 2: at 07:53

## 2019-12-25 NOTE — PROGRESS NOTE ADULT - PROBLEM SELECTOR PLAN 1
Weakness of RUE and RLE  No evidence of CVA on MRI  Malingering vs Factitious vs Adjustment d/o  Psych f/u  Continue eliquis and plavix  neuro check q4h  physical therapy  neuro f/u  Further work-up/management pending clinical course.

## 2019-12-25 NOTE — PROGRESS NOTE ADULT - ASSESSMENT
48 yo male with PMHx significant of CAD s/p BMS to RCA (8/2019), RUL subsegmental PE (6/2019, on Eliquis), hx of NSTEMI and PAF s/p ex-lap omentopexy and plication for perforated antral ulcer (5/2019), osteomyelitis s/p debridement, CVA s/p tPA (8/2019), HTN and DM2 (not on insulin) presenting to the ED with right sided weakness, facial droop, and slurred speech.    TIA? s/p CVA  - Neurology evaluation appreciated.  Follow recs  - MR head negative   - Watch on telemetry. He does have a history of AF, and may have been off his anticoagulation in the short term  - He remained in NS[facility]    Afib  - Maintaining SR on tele  - Amiodarone was previously discontinued as it may have been causing his thyroid issues.  - Continue with lopressor BID with hold parameters  - Continue Eliquis BID  - He had a recent TTE on 8/2019 showed normal LVSF, EF 65%, normal RVSF and mild-mod MR.  No need to repeat TTE.    - Monitor electrolytes, replete to keep K>4 and Mag>2    CAD s/p PCI  - He has no symptoms suggestive of ischemia  - Continue Plavix 75 QD  - Continue Statin  - Continue Imdur  - Continue Losartan    Will follow with you    Henrietta Kim DNP, NP-C  Cardiology   Spectra #[phone]

## 2019-12-25 NOTE — PROGRESS NOTE ADULT - SUBJECTIVE AND OBJECTIVE BOX
Neurology Follow up note    SARAH MORRISON49yMale    HPI:  48 yo M with hx CVA in past, is on Eliquis (unclear what time last took), also h/o AF, HTN, hyperthyroid, DM, BPH - came in c/o sudden onset R sided facial droop and weakness to R side of body. Pt with hx same symptoms in August - cta / MRI were negative at that time. Pt now has recurrent symptoms -- onset ~ 30 min PTA. No cp/sob/palp. Pt states feels overall weak. No fall/ recent trauma. No aggravating/alleviating factors. No other injuries or other complaints. (23 Dec 2019 22:25)      Interval History - no new events,    Patient is seen, chart was reviewed and case was discussed with the treatment team.  Pt is not in any distress.   Lying on bed comfortably.   No events reported overnight.   No clinical seizure was reported.  Sitting on chair bed comfortably.    is at bedside.    Vital Signs Last 24 Hrs  T(C): 36.6 (25 Dec 2019 07:57), Max: 36.8 (24 Dec 2019 11:27)  T(F): 97.9 (25 Dec 2019 07:57), Max: 98.2 (24 Dec 2019 11:27)  HR: 62 (25 Dec 2019 07:57) (55 - 69)  BP: 134/71 (25 Dec 2019 07:57) (98/60 - 146/76)  BP(mean): --  RR: 18 (25 Dec 2019 07:57) (16 - 19)  SpO2: 94% (25 Dec 2019 07:57) (93% - 96%)      REVIEW OF SYSTEMS:    Constitutional: No fever, weight loss or fatigue  Eyes: No eye pain, visual disturbances, or discharge  ENT:  No difficulty hearing, tinnitus, vertigo; No sinus or throat pain  Neck: No pain or stiffness  Respiratory: No cough, wheezing, chills or hemoptysis  Cardiovascular: No chest pain, palpitations, shortness of breath,   Gastrointestinal: No abdominal or epigastric pain.   Genitourinary: No dysuria, frequency, hematuria or incontinence  Neurological: No headaches, memory loss, loss of strength, numbness or tremors  Psychiatric: No depression, anxiety, mood swings or difficulty sleeping  Musculoskeletal: No joint pain or swelling; No muscle, back or extremity pain  Skin: No itching, burning, rashes or lesions   Lymph Nodes: No enlarged glands  Endocrine: No heat or cold intolerance;   Allergy and Immunologic: No hives or eczema    On Neurological Examination:    Mental Status - Pt is alert, awake, oriented X3. Follows commands well and able to answer questions appropriately      Speech -  mild dysarthria ?    Cranial Nerves - Pupils 3 mm equal and reactive to light, extraocular eye movements intact. Pt has no visual field deficit.  Pt has no  facial asymmetry. Facial sensation is intact.Tongue - is in midline.    Muscle tone - is normal all over,      Motor Exam - RUE , 3/5 (weakness today); RLE 2/5  LUE 5/5; LLE 4+/5    Sensory Exam Pt withdraws all extremities equally on stimulation.        coordination:    Finger to nose: normal    Deep tendon Reflexes - 2 plus all over.        Romberg - Negative.    Neck Supple -  Yes.     MEDICATIONS    acetaminophen   Tablet .. 650 milliGRAM(s) Oral every 6 hours PRN  apixaban 5 milliGRAM(s) Oral every 12 hours  atorvastatin 80 milliGRAM(s) Oral at bedtime  bethanechol 25 milliGRAM(s) Oral three times a day  cefuroxime   Tablet 250 milliGRAM(s) Oral every 12 hours  clopidogrel Tablet 75 milliGRAM(s) Oral daily  dextrose 40% Gel 15 Gram(s) Oral once PRN  dextrose 5%. 1000 milliLiter(s) IV Continuous <Continuous>  dextrose 50% Injectable 12.5 Gram(s) IV Push once  dextrose 50% Injectable 25 Gram(s) IV Push once  dextrose 50% Injectable 25 Gram(s) IV Push once  glucagon  Injectable 1 milliGRAM(s) IntraMuscular once PRN  insulin lispro (HumaLOG) corrective regimen sliding scale   SubCutaneous three times a day before meals  insulin lispro (HumaLOG) corrective regimen sliding scale   SubCutaneous at bedtime  isosorbide   mononitrate ER Tablet (IMDUR) 30 milliGRAM(s) Oral daily  losartan 25 milliGRAM(s) Oral daily  methimazole 10 milliGRAM(s) Oral daily  metoprolol tartrate 25 milliGRAM(s) Oral every 12 hours  pantoprazole    Tablet 40 milliGRAM(s) Oral before breakfast  predniSONE   Tablet 5 milliGRAM(s) Oral daily  sucralfate 1 Gram(s) Oral four times a day  tamsulosin 0.4 milliGRAM(s) Oral at bedtime      Allergies    fish (Hives)  No Known Drug Allergies    Intolerances        LABS:  CBC Full  -  ( 24 Dec 2019 06:19 )  WBC Count : 6.81 K/uL  RBC Count : 3.76 M/uL  Hemoglobin : 9.7 g/dL  Hematocrit : 31.4 %  Platelet Count - Automated : 253 K/uL  Mean Cell Volume : 83.5 fl  Mean Cell Hemoglobin : 25.8 pg   x  Auto Basophil % : x      12-24    139  |  107  |  15  ----------------------------<  245<H>  3.7   |  25  |  0.97    Ca    8.3<L>      24 Dec 2019 06:19  Mg     2.1     12-24    TPro  7.6  /  Alb  3.3  /  TBili  0.2  /  DBili  x   /  AST  18  /  ALT  27  /  AlkPhos  170<H>  12-23    Hemoglobin A1C: Hemoglobin A1C, Whole Blood: 8.4 % (12-24 @ 07:53)    Lipid Panel 12-24 @ 07:56  Total Cholesterol, Serum 110  LDL 43  Triglycerides 83    Vitamin B12     RADIOLOGY  < from: MR Head No Cont (12.24.19 @ 13:08) >    EXAM:  MR BRAIN                            PROCEDURE DATE:  12/24/2019          INTERPRETATION:  CLINICAL STATEMENT: Right-sided numbness    TECHNIQUE: MRI of the brain was performed without gadolinium.    COMPARISON: CT head 12/23/2019    FINDINGS:  There is no abnormal T2 prolongation signal in the white matter.    There is no acute parenchymal hemorrhage, parenchymal mass, mass effect or midline shift. There is no extra-axial fluid collection.  There is no hydrocephalus.  There is no acute infarct. Partial empty sella    Mucosal thickening paranasal sinuses. Partial opacification left mastoid air cells    IMPRESSION:  No acute intracranial hemorrhage or acute infarct.      BERKLEY BARNETT M.D., ATTENDING RADIOLOGIST  This document has been electronically signed. Dec 24 2019  1:15PM              ASSESSMENT AND PLAN:      PRESENTED WITH RIGHT SIDED WEAKNESS/SLURRED SPEECH  WITH MRI OF THE HEAD SHOWING NO ACUTE CVA ? CONVERSION DISORDER  SIMILAR PRESENTATION TWICE BEFORE, RECEIVED TPA INTIALLY.  EEG WAS ALSO UNREMARKABLE  STABLE CEREBRAL ANEURYSM  AF      NO FURTHER NEURO BARGER.  RESUME AC.  PSYCH EVAL FOR CONVERSION DISORDER  Physical therapy evaluation.  OOB to chair/ambulation with assistance only.  Pain is accessed and addressed.  Would continue to follow.                        Last Updated: 24-Dec-2019 17:25 by Stone Jon)

## 2019-12-25 NOTE — PROGRESS NOTE ADULT - SUBJECTIVE AND OBJECTIVE BOX
Patient is a 49y old  Male who presents with a chief complaint of R sided numbness (25 Dec 2019 10:20)      INTERVAL HPI/OVERNIGHT EVENTS: Patient seen and examined. NAD. No complaints.    Vital Signs Last 24 Hrs  T(C): 36.6 (25 Dec 2019 11:12), Max: 36.7 (24 Dec 2019 23:19)  T(F): 97.9 (25 Dec 2019 11:12), Max: 98 (24 Dec 2019 23:19)  HR: 55 (25 Dec 2019 11:12) (55 - 69)  BP: 129/72 (25 Dec 2019 11:12) (98/60 - 146/76)  BP(mean): --  RR: 18 (25 Dec 2019 11:12) (16 - 19)  SpO2: 94% (25 Dec 2019 11:12) (94% - 96%)    12-24    139  |  107  |  15  ----------------------------<  245<H>  3.7   |  25  |  0.97    Ca    8.3<L>      24 Dec 2019 06:19  Mg     2.1     12-24    TPro  7.6  /  Alb  3.3  /  TBili  0.2  /  DBili  x   /  AST  18  /  ALT  27  /  AlkPhos  170<H>  12-23                          9.7    6.81  )-----------( 253      ( 24 Dec 2019 06:19 )             31.4     PT/INR - ( 23 Dec 2019 15:53 )   PT: 11.7 sec;   INR: 1.03 ratio         PTT - ( 23 Dec 2019 15:53 )  PTT:32.1 sec  CAPILLARY BLOOD GLUCOSE      POCT Blood Glucose.: 306 mg/dL (25 Dec 2019 11:40)  POCT Blood Glucose.: 249 mg/dL (25 Dec 2019 07:41)  POCT Blood Glucose.: 241 mg/dL (24 Dec 2019 21:51)  POCT Blood Glucose.: 253 mg/dL (24 Dec 2019 16:59)              acetaminophen   Tablet .. 650 milliGRAM(s) Oral every 6 hours PRN  apixaban 5 milliGRAM(s) Oral every 12 hours  atorvastatin 80 milliGRAM(s) Oral at bedtime  bethanechol 25 milliGRAM(s) Oral three times a day  cefuroxime   Tablet 250 milliGRAM(s) Oral every 12 hours  clopidogrel Tablet 75 milliGRAM(s) Oral daily  dextrose 40% Gel 15 Gram(s) Oral once PRN  dextrose 5%. 1000 milliLiter(s) IV Continuous <Continuous>  dextrose 50% Injectable 12.5 Gram(s) IV Push once  dextrose 50% Injectable 25 Gram(s) IV Push once  dextrose 50% Injectable 25 Gram(s) IV Push once  glucagon  Injectable 1 milliGRAM(s) IntraMuscular once PRN  insulin lispro (HumaLOG) corrective regimen sliding scale   SubCutaneous three times a day before meals  insulin lispro (HumaLOG) corrective regimen sliding scale   SubCutaneous at bedtime  isosorbide   mononitrate ER Tablet (IMDUR) 30 milliGRAM(s) Oral daily  losartan 25 milliGRAM(s) Oral daily  methimazole 10 milliGRAM(s) Oral daily  metoprolol tartrate 25 milliGRAM(s) Oral every 12 hours  pantoprazole    Tablet 40 milliGRAM(s) Oral before breakfast  predniSONE   Tablet 5 milliGRAM(s) Oral daily  sucralfate 1 Gram(s) Oral four times a day  tamsulosin 0.4 milliGRAM(s) Oral at bedtime              REVIEW OF SYSTEMS:  CONSTITUTIONAL: No fever, no weight loss, or no fatigue  NECK: No pain, no stiffness  RESPIRATORY: No cough, no wheezing, no chills, no hemoptysis, No shortness of breath  CARDIOVASCULAR: No chest pain, no palpitations, no dizziness, no leg swelling  GASTROINTESTINAL: No abdominal pain. No nausea, no vomiting, no hematemesis; No diarrhea, no constipation. No melena, no hematochezia.  GENITOURINARY: No dysuria, no frequency, no hematuria, no incontinence  NEUROLOGICAL: No headaches, no loss of strength, no numbness, no tremors  SKIN: No itching, no burning  MUSCULOSKELETAL: No joint pain, no swelling; No muscle, no back, no extremity pain  PSYCHIATRIC: No depression, no mood swings,   HEME/LYMPH: No easy bruising, no bleeding gums  ALLERY AND IMMUNOLOGIC: No hives       Consultant(s) Notes Reviewed:  [X] YES  [ ] NO    PHYSICAL EXAM:  GENERAL: NAD  HEAD:  Atraumatic, Normocephalic  EYES: EOMI, PERRLA, conjunctiva and sclera clear  ENMT: No tonsillar erythema, exudates, or enlargement; Moist mucous membranes  NECK: Supple, No JVD  NERVOUS SYSTEM:  Awake & alert  CHEST/LUNG: Clear to auscultation bilaterally; No rales, rhonchi, wheezing,  HEART: Regular rate and rhythm  ABDOMEN: Soft, Nontender, Nondistended; Bowel sounds present  EXTREMITIES:  No clubbing, cyanosis, or edema  LYMPH: No lymphadenopathy noted  SKIN: No rashes      Advanced care planning discussed with patient/family [X] YES   [ ] NO    Advanced care planning discussed with patient/family. Advanced care planning forms reviewed/discussed/completed. 20 minutes spent.

## 2019-12-25 NOTE — PROGRESS NOTE ADULT - SUBJECTIVE AND OBJECTIVE BOX
[facility] -- Bolivar Carbajal, Brittany Gallegos, Reynaldo Sweeney Savella, [place]  Office # [phone]    Follow Up:  Neuro symptoms, Afib on eliquis    Subjective/Observations: Awake and alert, c/o right sided weakness.  Denies any respiratory to cardiac discomfort.  No tele events    REVIEW OF SYSTEMS: All other review of systems is negative unless indicated above  PAST MEDICAL & SURGICAL HISTORY:  Cerebrovascular accident  CAD S/P percutaneous coronary angioplasty  Osteomyelitis: s/p debridement  History of non-ST elevation myocardial infarction (NSTEMI)  Pulmonary embolism  Perforated gastric ulcer: s/p emergent ex-lap omentopexy and plication 6/2019  BPH (benign prostatic hyperplasia)  Hypertension  Afib  Diabetes mellitus with no complication  Diabetes  Traumatic amputation of left foot, initial encounter  Perforated gastric ulcer  H/O abdominal surgery    MEDICATIONS  (STANDING):  apixaban 5 milliGRAM(s) Oral every 12 hours  atorvastatin 80 milliGRAM(s) Oral at bedtime  bethanechol 25 milliGRAM(s) Oral three times a day  cefuroxime   Tablet 250 milliGRAM(s) Oral every 12 hours  clopidogrel Tablet 75 milliGRAM(s) Oral daily  dextrose 5%. 1000 milliLiter(s) (50 mL/Hr) IV Continuous <Continuous>  dextrose 50% Injectable 12.5 Gram(s) IV Push once  dextrose 50% Injectable 25 Gram(s) IV Push once  dextrose 50% Injectable 25 Gram(s) IV Push once  insulin lispro (HumaLOG) corrective regimen sliding scale   SubCutaneous three times a day before meals  insulin lispro (HumaLOG) corrective regimen sliding scale   SubCutaneous at bedtime  isosorbide   mononitrate ER Tablet (IMDUR) 30 milliGRAM(s) Oral daily  losartan 25 milliGRAM(s) Oral daily  methimazole 10 milliGRAM(s) Oral daily  metoprolol tartrate 25 milliGRAM(s) Oral every 12 hours  pantoprazole    Tablet 40 milliGRAM(s) Oral before breakfast  predniSONE   Tablet 5 milliGRAM(s) Oral daily  sucralfate 1 Gram(s) Oral four times a day  tamsulosin 0.4 milliGRAM(s) Oral at bedtime    MEDICATIONS  (PRN):  acetaminophen   Tablet .. 650 milliGRAM(s) Oral every 6 hours PRN Temp greater or equal to 38C (100.4F), Mild Pain (1 - 3)  dextrose 40% Gel 15 Gram(s) Oral once PRN Blood Glucose LESS THAN 70 milliGRAM(s)/deciLiter  glucagon  Injectable 1 milliGRAM(s) IntraMuscular once PRN Glucose <70 milliGRAM(s)/deciLiter    Allergies    fish (Hives)  No Known Drug Allergies    Intolerances      Vital Signs Last 24 Hrs  T(C): 36.6 (25 Dec 2019 07:57), Max: 36.8 (24 Dec 2019 11:27)  T(F): 97.9 (25 Dec 2019 07:57), Max: 98.2 (24 Dec 2019 11:27)  HR: 62 (25 Dec 2019 07:57) (55 - 69)  BP: 134/71 (25 Dec 2019 07:57) (98/60 - 146/76)  BP(mean): --  RR: 18 (25 Dec 2019 07:57) (16 - 19)  SpO2: 94% (25 Dec 2019 07:57) (93% - 96%)  I&O's Summary    24 Dec 2019 07:01  -  25 Dec 2019 07:00  --------------------------------------------------------  IN: 0 mL / OUT: 3900 mL / NET: -3900 mL    PHYSICAL EXAM:  TELE: NSR  Constitutional: NAD, awake and alert, obese  HEENT: Moist Mucous Membranes, Anicteric  Pulmonary: Non-labored, breath sounds are clear bilaterally, No wheezing or rhonchi. + rales at bases  Cardiovascular: Regular, S1 and S2, No murmurs, rubs, gallops or clicks  Gastrointestinal: Bowel Sounds present, soft, nontender.   Lymph: No peripheral edema. No lymphadenopathy.  Extr: Right sided weakness?  Skin: No visible rashes or ulcers.  Psych:  Mood & affect appropriate  LABS: All Labs Reviewed:                        9.7    6.81  )-----------( 253      ( 24 Dec 2019 06:19 )             31.4                         10.9   8.92  )-----------( 334      ( 23 Dec 2019 15:53 )             35.6     24 Dec 2019 06:19    139    |  107    |  15     ----------------------------<  245    3.7     |  25     |  0.97   23 Dec 2019 15:53    137    |  102    |  17     ----------------------------<  378    3.9     |  27     |  1.10     Ca    8.3        24 Dec 2019 06:19  Ca    9.3        23 Dec 2019 15:53  Mg     2.1       24 Dec 2019 06:19    TPro  7.6    /  Alb  3.3    /  TBili  0.2    /  DBili  x      /  AST  18     /  ALT  27     /  AlkPhos  170    23 Dec 2019 15:53    PT/INR - ( 23 Dec 2019 15:53 )   PT: 11.7 sec;   INR: 1.03 ratio     PTT - ( 23 Dec 2019 15:53 )  PTT:32.1 sec    < from: TTE Echo Doppler w/o Cont (08.15.19 @ 16:33) >     EXAM:  ECHO TTE WO CON COMP W DOPPLR      PROCEDURE DATE:  08/15/2019      INTERPRETATION:  INDICATION: Atrial fibrillation    Blood Pressure 128/64    Height 187.9 cm     Weight 91 kg       BSA 2.18   sq m    Dimensions:    LA 3.7       Normal Values: 2.0 - 4.0 cm    Ao 3.6        Normal Values: 2.0 - 3.8 cm  SEPTUM 1.6       Normal Values: 0.6 - 1.2 cm  PWT 1.4       Normal Values: 0.6 - 1.1 cm  LVIDd 5.3         Normal Values: 3.0 - 5.6 cm  LVIDs 2.7         Normal Values: 1.8 - 4.0 cm    OBSERVATIONS:  Technically difficult study  Mitral Valve: normal, mild to moderate MR.  Aortic Valve/Aorta: Aortic valve is not well-visualized, likely normal   function.  Tricuspid Valve: normal with trace TR.  Pulmonic Valve: normal  Left Atrium: Enlarged  Right Atrium: normal  Left Ventricle: normal LV size and systolic function, estimated LVEF of   65%. Left ventricular hypertrophy  Right Ventricle: Grossly normal size and systolic function.  Pericardium/Pleura: normal, no significant pericardial effusion.  LV Diastolic Function: normal    Conclusion:   Technically difficult study  Normal left ventricular internal dimensions and systolic function,   estimated LVEF of 65%.   Grossly normal RV size and systolic function.   Left atrial enlargement  Aortic valve is not well-visualized, likely normal function.   Mild to moderate MR   Trace TR.    No significant pericardial effusion.     MIROSLAVA CASTAÑEDA   This document has been electronically signed. Aug 16 2019  8:25AM     < end of copied text >    < from: MR Head No Cont (12.24.19 @ 13:08) >    EXAM:  MR BRAIN                          PROCEDURE DATE:  12/24/2019      INTERPRETATION:  CLINICAL STATEMENT: Right-sided numbness    TECHNIQUE: MRI of the brain was performed without gadolinium.    COMPARISON: CT head 12/23/2019    FINDINGS:  There is no abnormal T2 prolongation signal in the white matter.    There is no acute parenchymal hemorrhage, parenchymal mass, mass effect or midline shift. There is no extra-axial fluid collection.  There is no hydrocephalus.  There is no acute infarct. Partial empty sella    Mucosal thickening paranasal sinuses. Partial opacification left mastoid air cells    IMPRESSION:  No acute intracranial hemorrhage or acute infarct.    BERKLEY BARNETT M.D., ATTENDING RADIOLOGIST  This document has been electronically signed. Dec 24 2019  1:15PM     < end of copied text >    < from: Xray Chest 1 View AP/PA (12.23.19 @ 16:16) >    EXAM:  XR CHEST AP OR PA 1V                            PROCEDURE DATE:  12/23/2019      INTERPRETATION:  Clinical information: Right-sided facial droop    Portable study, 4:11 PM    Comparison study dated 10/25/2019    Cardiac monitor leads present    Borderline cardiomegaly. No sign of lobar consolidation vascular congestion or effusion. Hilar regions, mediastinal contours intact. No acute osseous abnormalities.    IMPRESSION: See above report    LETY LONDONO M.D.,ATTENDINGRADIOLOGIST  This document has been electronically signed. Dec 23 2019  4:20PM      < end of copied text > [facility] -- Bolivar Carbajal, Brittany Gallegos, Reynaldo Sweeney Savella, Goodger  Office # [phone]    Follow Up:  Neuro symptoms, Afib on eliquis    Subjective/Observations: Awake and alert, c/o right sided weakness.  Denies any respiratory to cardiac discomfort.  No tele events    REVIEW OF SYSTEMS: All other review of systems is negative unless indicated above  PAST MEDICAL & SURGICAL HISTORY:  Cerebrovascular accident  CAD S/P percutaneous coronary angioplasty  Osteomyelitis: s/p debridement  History of non-ST elevation myocardial infarction (NSTEMI)  Pulmonary embolism  Perforated gastric ulcer: s/p emergent ex-lap omentopexy and plication 6/2019  BPH (benign prostatic hyperplasia)  Hypertension  Afib  Diabetes mellitus with no complication  Diabetes  Traumatic amputation of left foot, initial encounter  Perforated gastric ulcer  H/O abdominal surgery    MEDICATIONS  (STANDING):  apixaban 5 milliGRAM(s) Oral every 12 hours  atorvastatin 80 milliGRAM(s) Oral at bedtime  bethanechol 25 milliGRAM(s) Oral three times a day  cefuroxime   Tablet 250 milliGRAM(s) Oral every 12 hours  clopidogrel Tablet 75 milliGRAM(s) Oral daily  dextrose 5%. 1000 milliLiter(s) (50 mL/Hr) IV Continuous <Continuous>  dextrose 50% Injectable 12.5 Gram(s) IV Push once  dextrose 50% Injectable 25 Gram(s) IV Push once  dextrose 50% Injectable 25 Gram(s) IV Push once  insulin lispro (HumaLOG) corrective regimen sliding scale   SubCutaneous three times a day before meals  insulin lispro (HumaLOG) corrective regimen sliding scale   SubCutaneous at bedtime  isosorbide   mononitrate ER Tablet (IMDUR) 30 milliGRAM(s) Oral daily  losartan 25 milliGRAM(s) Oral daily  methimazole 10 milliGRAM(s) Oral daily  metoprolol tartrate 25 milliGRAM(s) Oral every 12 hours  pantoprazole    Tablet 40 milliGRAM(s) Oral before breakfast  predniSONE   Tablet 5 milliGRAM(s) Oral daily  sucralfate 1 Gram(s) Oral four times a day  tamsulosin 0.4 milliGRAM(s) Oral at bedtime    MEDICATIONS  (PRN):  acetaminophen   Tablet .. 650 milliGRAM(s) Oral every 6 hours PRN Temp greater or equal to 38C (100.4F), Mild Pain (1 - 3)  dextrose 40% Gel 15 Gram(s) Oral once PRN Blood Glucose LESS THAN 70 milliGRAM(s)/deciLiter  glucagon  Injectable 1 milliGRAM(s) IntraMuscular once PRN Glucose <70 milliGRAM(s)/deciLiter    Allergies    fish (Hives)  No Known Drug Allergies    Intolerances      Vital Signs Last 24 Hrs  T(C): 36.6 (25 Dec 2019 07:57), Max: 36.8 (24 Dec 2019 11:27)  T(F): 97.9 (25 Dec 2019 07:57), Max: 98.2 (24 Dec 2019 11:27)  HR: 62 (25 Dec 2019 07:57) (55 - 69)  BP: 134/71 (25 Dec 2019 07:57) (98/60 - 146/76)  BP(mean): --  RR: 18 (25 Dec 2019 07:57) (16 - 19)  SpO2: 94% (25 Dec 2019 07:57) (93% - 96%)  I&O's Summary    24 Dec 2019 07:01  -  25 Dec 2019 07:00  --------------------------------------------------------  IN: 0 mL / OUT: 3900 mL / NET: -3900 mL    PHYSICAL EXAM:  TELE: NSR  Constitutional: NAD, awake and alert, obese  HEENT: Moist Mucous Membranes, Anicteric  Pulmonary: Non-labored, breath sounds are clear bilaterally, No wheezing or rhonchi. + rales at bases  Cardiovascular: Regular, S1 and S2, + murmurs.  No rubs, gallops or clicks  Gastrointestinal: Bowel Sounds present, soft, nontender.   Lymph: No peripheral edema. No lymphadenopathy.  Extr: Right sided weakness?  Skin: No visible rashes or ulcers.  Psych:  Mood & affect appropriate  LABS: All Labs Reviewed:                        9.7    6.81  )-----------( 253      ( 24 Dec 2019 06:19 )             31.4                         10.9   8.92  )-----------( 334      ( 23 Dec 2019 15:53 )             35.6     24 Dec 2019 06:19    139    |  107    |  15     ----------------------------<  245    3.7     |  25     |  0.97   23 Dec 2019 15:53    137    |  102    |  17     ----------------------------<  378    3.9     |  27     |  1.10     Ca    8.3        24 Dec 2019 06:19  Ca    9.3        23 Dec 2019 15:53  Mg     2.1       24 Dec 2019 06:19    TPro  7.6    /  Alb  3.3    /  TBili  0.2    /  DBili  x      /  AST  18     /  ALT  27     /  AlkPhos  170    23 Dec 2019 15:53    PT/INR - ( 23 Dec 2019 15:53 )   PT: 11.7 sec;   INR: 1.03 ratio     PTT - ( 23 Dec 2019 15:53 )  PTT:32.1 sec    < from: TTE Echo Doppler w/o Cont (08.15.19 @ 16:33) >     EXAM:  ECHO TTE WO CON COMP W DOPPLR      PROCEDURE DATE:  08/15/2019      INTERPRETATION:  INDICATION: Atrial fibrillation    Blood Pressure 128/64    Height 187.9 cm     Weight 91 kg       BSA 2.18   sq m    Dimensions:    LA 3.7       Normal Values: 2.0 - 4.0 cm    Ao 3.6        Normal Values: 2.0 - 3.8 cm  SEPTUM 1.6       Normal Values: 0.6 - 1.2 cm  PWT 1.4       Normal Values: 0.6 - 1.1 cm  LVIDd 5.3         Normal Values: 3.0 - 5.6 cm  LVIDs 2.7         Normal Values: 1.8 - 4.0 cm    OBSERVATIONS:  Technically difficult study  Mitral Valve: normal, mild to moderate MR.  Aortic Valve/Aorta: Aortic valve is not well-visualized, likely normal   function.  Tricuspid Valve: normal with trace TR.  Pulmonic Valve: normal  Left Atrium: Enlarged  Right Atrium: normal  Left Ventricle: normal LV size and systolic function, estimated LVEF of   65%. Left ventricular hypertrophy  Right Ventricle: Grossly normal size and systolic function.  Pericardium/Pleura: normal, no significant pericardial effusion.  LV Diastolic Function: normal    Conclusion:   Technically difficult study  Normal left ventricular internal dimensions and systolic function,   estimated LVEF of 65%.   Grossly normal RV size and systolic function.   Left atrial enlargement  Aortic valve is not well-visualized, likely normal function.   Mild to moderate MR   Trace TR.    No significant pericardial effusion.     MIROSLAVA CASTAÑEDA   This document has been electronically signed. Aug 16 2019  8:25AM     < end of copied text >    < from: MR Head No Cont (12.24.19 @ 13:08) >    EXAM:  MR BRAIN                          PROCEDURE DATE:  12/24/2019      INTERPRETATION:  CLINICAL STATEMENT: Right-sided numbness    TECHNIQUE: MRI of the brain was performed without gadolinium.    COMPARISON: CT head 12/23/2019    FINDINGS:  There is no abnormal T2 prolongation signal in the white matter.    There is no acute parenchymal hemorrhage, parenchymal mass, mass effect or midline shift. There is no extra-axial fluid collection.  There is no hydrocephalus.  There is no acute infarct. Partial empty sella    Mucosal thickening paranasal sinuses. Partial opacification left mastoid air cells    IMPRESSION:  No acute intracranial hemorrhage or acute infarct.    BERKLEY BARNETT M.D., ATTENDING RADIOLOGIST  This document has been electronically signed. Dec 24 2019  1:15PM     < end of copied text >    < from: Xray Chest 1 View AP/PA (12.23.19 @ 16:16) >    EXAM:  XR CHEST AP OR PA 1V                            PROCEDURE DATE:  12/23/2019      INTERPRETATION:  Clinical information: Right-sided facial droop    Portable study, 4:11 PM    Comparison study dated 10/25/2019    Cardiac monitor leads present    Borderline cardiomegaly. No sign of lobar consolidation vascular congestion or effusion. Hilar regions, mediastinal contours intact. No acute osseous abnormalities.    IMPRESSION: See above report    LETY LONDONO M.D.,ATTENDINGRADIOLOGIST  This document has been electronically signed. Dec 23 2019  4:20PM      < end of copied text > [facility] -- Bolivar Carbajal, Brittany Gallegos, Reynaldo Sweeney Savella, Goodger  Office # [phone]    Follow Up:  Neuro symptoms, Afib on eliquis    Subjective/Observations: Awake and alert, c/o right sided weakness.  Denies any respiratory to cardiac discomfort.  No tele events    REVIEW OF SYSTEMS: All other review of systems is negative unless indicated above  PAST MEDICAL & SURGICAL HISTORY:  Cerebrovascular accident  CAD S/P percutaneous coronary angioplasty  Osteomyelitis: s/p debridement  History of non-ST elevation myocardial infarction (NSTEMI)  Pulmonary embolism  Perforated gastric ulcer: s/p emergent ex-lap omentopexy and plication 6/2019  BPH (benign prostatic hyperplasia)  Hypertension  Afib  Diabetes mellitus with no complication  Diabetes  Traumatic amputation of left foot, initial encounter  Perforated gastric ulcer  H/O abdominal surgery    MEDICATIONS  (STANDING):  apixaban 5 milliGRAM(s) Oral every 12 hours  atorvastatin 80 milliGRAM(s) Oral at bedtime  bethanechol 25 milliGRAM(s) Oral three times a day  cefuroxime   Tablet 250 milliGRAM(s) Oral every 12 hours  clopidogrel Tablet 75 milliGRAM(s) Oral daily  dextrose 5%. 1000 milliLiter(s) (50 mL/Hr) IV Continuous <Continuous>  dextrose 50% Injectable 12.5 Gram(s) IV Push once  dextrose 50% Injectable 25 Gram(s) IV Push once  dextrose 50% Injectable 25 Gram(s) IV Push once  insulin lispro (HumaLOG) corrective regimen sliding scale   SubCutaneous three times a day before meals  insulin lispro (HumaLOG) corrective regimen sliding scale   SubCutaneous at bedtime  isosorbide   mononitrate ER Tablet (IMDUR) 30 milliGRAM(s) Oral daily  losartan 25 milliGRAM(s) Oral daily  methimazole 10 milliGRAM(s) Oral daily  metoprolol tartrate 25 milliGRAM(s) Oral every 12 hours  pantoprazole    Tablet 40 milliGRAM(s) Oral before breakfast  predniSONE   Tablet 5 milliGRAM(s) Oral daily  sucralfate 1 Gram(s) Oral four times a day  tamsulosin 0.4 milliGRAM(s) Oral at bedtime    MEDICATIONS  (PRN):  acetaminophen   Tablet .. 650 milliGRAM(s) Oral every 6 hours PRN Temp greater or equal to 38C (100.4F), Mild Pain (1 - 3)  dextrose 40% Gel 15 Gram(s) Oral once PRN Blood Glucose LESS THAN 70 milliGRAM(s)/deciLiter  glucagon  Injectable 1 milliGRAM(s) IntraMuscular once PRN Glucose <70 milliGRAM(s)/deciLiter    Allergies    fish (Hives)  No Known Drug Allergies    Intolerances      Vital Signs Last 24 Hrs  T(C): 36.6 (25 Dec 2019 07:57), Max: 36.8 (24 Dec 2019 11:27)  T(F): 97.9 (25 Dec 2019 07:57), Max: 98.2 (24 Dec 2019 11:27)  HR: 62 (25 Dec 2019 07:57) (55 - 69)  BP: 134/71 (25 Dec 2019 07:57) (98/60 - 146/76)  BP(mean): --  RR: 18 (25 Dec 2019 07:57) (16 - 19)  SpO2: 94% (25 Dec 2019 07:57) (93% - 96%)  I&O's Summary    24 Dec 2019 07:01  -  25 Dec 2019 07:00  --------------------------------------------------------  IN: 0 mL / OUT: 3900 mL / NET: -3900 mL    PHYSICAL EXAM:  TELE: NSR  Constitutional: NAD, awake and alert, obese  HEENT: Moist Mucous Membranes, Anicteric  Pulmonary: Non-labored, breath sounds are clear bilaterally, No wheezing or rhonchi. + fine rales at bases  Cardiovascular: Regular, S1 and S2, + murmurs.  No rubs, gallops or clicks  Gastrointestinal: Bowel Sounds present, soft, nontender.   Lymph: No peripheral edema. No lymphadenopathy.  Extr: Right sided weakness?  Skin: No visible rashes or ulcers.  Psych:  Mood & affect appropriate  LABS: All Labs Reviewed:                        9.7    6.81  )-----------( 253      ( 24 Dec 2019 06:19 )             31.4                         10.9   8.92  )-----------( 334      ( 23 Dec 2019 15:53 )             35.6     24 Dec 2019 06:19    139    |  107    |  15     ----------------------------<  245    3.7     |  25     |  0.97   23 Dec 2019 15:53    137    |  102    |  17     ----------------------------<  378    3.9     |  27     |  1.10     Ca    8.3        24 Dec 2019 06:19  Ca    9.3        23 Dec 2019 15:53  Mg     2.1       24 Dec 2019 06:19    TPro  7.6    /  Alb  3.3    /  TBili  0.2    /  DBili  x      /  AST  18     /  ALT  27     /  AlkPhos  170    23 Dec 2019 15:53    PT/INR - ( 23 Dec 2019 15:53 )   PT: 11.7 sec;   INR: 1.03 ratio     PTT - ( 23 Dec 2019 15:53 )  PTT:32.1 sec    < from: TTE Echo Doppler w/o Cont (08.15.19 @ 16:33) >     EXAM:  ECHO TTE WO CON COMP W DOPPLR      PROCEDURE DATE:  08/15/2019      INTERPRETATION:  INDICATION: Atrial fibrillation    Blood Pressure 128/64    Height 187.9 cm     Weight 91 kg       BSA 2.18   sq m    Dimensions:    LA 3.7       Normal Values: 2.0 - 4.0 cm    Ao 3.6        Normal Values: 2.0 - 3.8 cm  SEPTUM 1.6       Normal Values: 0.6 - 1.2 cm  PWT 1.4       Normal Values: 0.6 - 1.1 cm  LVIDd 5.3         Normal Values: 3.0 - 5.6 cm  LVIDs 2.7         Normal Values: 1.8 - 4.0 cm    OBSERVATIONS:  Technically difficult study  Mitral Valve: normal, mild to moderate MR.  Aortic Valve/Aorta: Aortic valve is not well-visualized, likely normal   function.  Tricuspid Valve: normal with trace TR.  Pulmonic Valve: normal  Left Atrium: Enlarged  Right Atrium: normal  Left Ventricle: normal LV size and systolic function, estimated LVEF of   65%. Left ventricular hypertrophy  Right Ventricle: Grossly normal size and systolic function.  Pericardium/Pleura: normal, no significant pericardial effusion.  LV Diastolic Function: normal    Conclusion:   Technically difficult study  Normal left ventricular internal dimensions and systolic function,   estimated LVEF of 65%.   Grossly normal RV size and systolic function.   Left atrial enlargement  Aortic valve is not well-visualized, likely normal function.   Mild to moderate MR   Trace TR.    No significant pericardial effusion.     MIROSLAVA CASTAÑEDA   This document has been electronically signed. Aug 16 2019  8:25AM     < end of copied text >    < from: MR Head No Cont (12.24.19 @ 13:08) >    EXAM:  MR BRAIN                          PROCEDURE DATE:  12/24/2019      INTERPRETATION:  CLINICAL STATEMENT: Right-sided numbness    TECHNIQUE: MRI of the brain was performed without gadolinium.    COMPARISON: CT head 12/23/2019    FINDINGS:  There is no abnormal T2 prolongation signal in the white matter.    There is no acute parenchymal hemorrhage, parenchymal mass, mass effect or midline shift. There is no extra-axial fluid collection.  There is no hydrocephalus.  There is no acute infarct. Partial empty sella    Mucosal thickening paranasal sinuses. Partial opacification left mastoid air cells    IMPRESSION:  No acute intracranial hemorrhage or acute infarct.    BERKLEY BARNETT M.D., ATTENDING RADIOLOGIST  This document has been electronically signed. Dec 24 2019  1:15PM     < end of copied text >    < from: Xray Chest 1 View AP/PA (12.23.19 @ 16:16) >    EXAM:  XR CHEST AP OR PA 1V                            PROCEDURE DATE:  12/23/2019      INTERPRETATION:  Clinical information: Right-sided facial droop    Portable study, 4:11 PM    Comparison study dated 10/25/2019    Cardiac monitor leads present    Borderline cardiomegaly. No sign of lobar consolidation vascular congestion or effusion. Hilar regions, mediastinal contours intact. No acute osseous abnormalities.    IMPRESSION: See above report    LETY LONDONO M.D.,ATTENDINGRADIOLOGIST  This document has been electronically signed. Dec 23 2019  4:20PM      < end of copied text >

## 2019-12-26 DIAGNOSIS — E05.90 THYROTOXICOSIS, UNSPECIFIED WITHOUT THYROTOXIC CRISIS OR STORM: ICD-10-CM

## 2019-12-26 PROCEDURE — 99232 SBSQ HOSP IP/OBS MODERATE 35: CPT

## 2019-12-26 RX ORDER — INSULIN GLARGINE 100 [IU]/ML
15 INJECTION, SOLUTION SUBCUTANEOUS
Refills: 0 | Status: DISCONTINUED | OUTPATIENT
Start: 2019-12-26 | End: 2020-01-06

## 2019-12-26 RX ORDER — INSULIN LISPRO 100/ML
VIAL (ML) SUBCUTANEOUS AT BEDTIME
Refills: 0 | Status: DISCONTINUED | OUTPATIENT
Start: 2019-12-26 | End: 2020-01-08

## 2019-12-26 RX ORDER — INSULIN LISPRO 100/ML
VIAL (ML) SUBCUTANEOUS
Refills: 0 | Status: DISCONTINUED | OUTPATIENT
Start: 2019-12-26 | End: 2020-01-08

## 2019-12-26 RX ADMIN — Medication 1 GRAM(S): at 17:12

## 2019-12-26 RX ADMIN — APIXABAN 5 MILLIGRAM(S): 2.5 TABLET, FILM COATED ORAL at 17:13

## 2019-12-26 RX ADMIN — Medication 5 MILLIGRAM(S): at 05:22

## 2019-12-26 RX ADMIN — CLOPIDOGREL BISULFATE 75 MILLIGRAM(S): 75 TABLET, FILM COATED ORAL at 11:21

## 2019-12-26 RX ADMIN — Medication 25 MILLIGRAM(S): at 05:22

## 2019-12-26 RX ADMIN — LOSARTAN POTASSIUM 25 MILLIGRAM(S): 100 TABLET, FILM COATED ORAL at 05:22

## 2019-12-26 RX ADMIN — Medication 2: at 07:54

## 2019-12-26 RX ADMIN — Medication 3: at 11:54

## 2019-12-26 RX ADMIN — ATORVASTATIN CALCIUM 80 MILLIGRAM(S): 80 TABLET, FILM COATED ORAL at 21:18

## 2019-12-26 RX ADMIN — PANTOPRAZOLE SODIUM 40 MILLIGRAM(S): 20 TABLET, DELAYED RELEASE ORAL at 05:22

## 2019-12-26 RX ADMIN — Medication 4: at 21:17

## 2019-12-26 RX ADMIN — APIXABAN 5 MILLIGRAM(S): 2.5 TABLET, FILM COATED ORAL at 05:22

## 2019-12-26 RX ADMIN — Medication 25 MILLIGRAM(S): at 21:18

## 2019-12-26 RX ADMIN — Medication 250 MILLIGRAM(S): at 17:13

## 2019-12-26 RX ADMIN — ISOSORBIDE MONONITRATE 30 MILLIGRAM(S): 60 TABLET, EXTENDED RELEASE ORAL at 11:21

## 2019-12-26 RX ADMIN — TAMSULOSIN HYDROCHLORIDE 0.4 MILLIGRAM(S): 0.4 CAPSULE ORAL at 21:19

## 2019-12-26 RX ADMIN — Medication 25 MILLIGRAM(S): at 11:21

## 2019-12-26 RX ADMIN — Medication 6: at 17:13

## 2019-12-26 RX ADMIN — Medication 1 GRAM(S): at 11:23

## 2019-12-26 RX ADMIN — INSULIN GLARGINE 15 UNIT(S): 100 INJECTION, SOLUTION SUBCUTANEOUS at 21:17

## 2019-12-26 RX ADMIN — Medication 1 GRAM(S): at 05:22

## 2019-12-26 RX ADMIN — Medication 250 MILLIGRAM(S): at 05:22

## 2019-12-26 NOTE — PROGRESS NOTE ADULT - SUBJECTIVE AND OBJECTIVE BOX
Neurology Follow up note    SARAH MORRISON49yMale    HPI:  48 yo M with hx CVA in past, is on Eliquis (unclear what time last took), also h/o AF, HTN, hyperthyroid, DM, BPH - came in c/o sudden onset R sided facial droop and weakness to R side of body. Pt with hx same symptoms in August - cta / MRI were negative at that time. Pt now has recurrent symptoms -- onset ~ 30 min PTA. No cp/sob/palp. Pt states feels overall weak. No fall/ recent trauma. No aggravating/alleviating factors. No other injuries or other complaints. (23 Dec 2019 22:25)      Interval History - my right leg still weak    Patient is seen, chart was reviewed and case was discussed with the treatment team.  Pt is not in any distress.   Lying on bed comfortably.   No events reported overnight.   No clinical seizure was reported.  Sitting on chair bed comfortably.    is at bedside.    Vital Signs Last 24 Hrs  T(C): 36.8 (26 Dec 2019 11:40), Max: 36.8 (25 Dec 2019 15:41)  T(F): 98.2 (26 Dec 2019 11:40), Max: 98.2 (25 Dec 2019 15:41)  HR: 65 (26 Dec 2019 11:40) (53 - 70)  BP: 153/91 (26 Dec 2019 11:40) (119/72 - 153/91)  BP(mean): --  RR: 18 (26 Dec 2019 11:40) (16 - 18)  SpO2: 96% (26 Dec 2019 11:40) (94% - 96%)      REVIEW OF SYSTEMS:    Constitutional: No fever, weight loss or fatigue  Eyes: No eye pain, visual disturbances, or discharge  ENT:  No difficulty hearing, tinnitus, vertigo; No sinus or throat pain  Neck: No pain or stiffness  Respiratory: No cough, wheezing, chills or hemoptysis  Cardiovascular: No chest pain, palpitations, shortness of breath,   Gastrointestinal: No abdominal or epigastric pain.   Genitourinary: No dysuria, frequency, hematuria or incontinence  Neurological: No headaches, memory loss, loss of strength, numbness or tremors  Psychiatric: No depression, anxiety, mood swings or difficulty sleeping  Musculoskeletal: No joint pain or swelling; No muscle, back or extremity pain  Skin: No itching, burning, rashes or lesions   Lymph Nodes: No enlarged glands  Endocrine: No heat or cold intolerance;   Allergy and Immunologic: No hives or eczema    On Neurological Examination:    Mental Status - Pt is alert, awake, oriented X3. Follows commands well and able to answer questions appropriately      Speech -  mild dysarthria ?    Cranial Nerves - Pupils 3 mm equal and reactive to light, extraocular eye movements intact. Pt has no visual field deficit.  Pt has no  facial asymmetry. Facial sensation is intact.Tongue - is in midline.    Muscle tone - is normal all over,      Motor Exam - RUE , 4/5 (weakness today); RLE 2/5  LUE 5/5; LLE 4+/5    Sensory Exam Pt withdraws all extremities equally on stimulation.        coordination:    Finger to nose: normal    Deep tendon Reflexes - 2 plus all over.        Romberg - Negative.    Neck Supple -  Yes.     MEDICATIONS    acetaminophen   Tablet .. 650 milliGRAM(s) Oral every 6 hours PRN  apixaban 5 milliGRAM(s) Oral every 12 hours  atorvastatin 80 milliGRAM(s) Oral at bedtime  bethanechol 25 milliGRAM(s) Oral three times a day  cefuroxime   Tablet 250 milliGRAM(s) Oral every 12 hours  clopidogrel Tablet 75 milliGRAM(s) Oral daily  dextrose 40% Gel 15 Gram(s) Oral once PRN  dextrose 5%. 1000 milliLiter(s) IV Continuous <Continuous>  dextrose 50% Injectable 12.5 Gram(s) IV Push once  dextrose 50% Injectable 25 Gram(s) IV Push once  dextrose 50% Injectable 25 Gram(s) IV Push once  glucagon  Injectable 1 milliGRAM(s) IntraMuscular once PRN  insulin lispro (HumaLOG) corrective regimen sliding scale   SubCutaneous three times a day before meals  insulin lispro (HumaLOG) corrective regimen sliding scale   SubCutaneous at bedtime  isosorbide   mononitrate ER Tablet (IMDUR) 30 milliGRAM(s) Oral daily  losartan 25 milliGRAM(s) Oral daily  methimazole 10 milliGRAM(s) Oral daily  metoprolol tartrate 25 milliGRAM(s) Oral every 12 hours  pantoprazole    Tablet 40 milliGRAM(s) Oral before breakfast  predniSONE   Tablet 5 milliGRAM(s) Oral daily  sucralfate 1 Gram(s) Oral four times a day  tamsulosin 0.4 milliGRAM(s) Oral at bedtime      Allergies    fish (Hives)  No Known Drug Allergies    Intolerances            12-24    139  |  107  |  15  ----------------------------<  245<H>  3.7   |  25  |  0.97    Ca    8.3<L>      24 Dec 2019 06:19  Mg     2.1     12-24    TPro  7.6  /  Alb  3.3  /  TBili  0.2  /  DBili  x   /  AST  18  /  ALT  27  /  AlkPhos  170<H>  12-23    Hemoglobin A1C: Hemoglobin A1C, Whole Blood: 8.4 % (12-24 @ 07:53)    Lipid Panel 12-24 @ 07:56  Total Cholesterol, Serum 110  LDL 43  Triglycerides 83    Vitamin B12     RADIOLOGY  < from: MR Head No Cont (12.24.19 @ 13:08) >    EXAM:  MR BRAIN                            PROCEDURE DATE:  12/24/2019          INTERPRETATION:  CLINICAL STATEMENT: Right-sided numbness    TECHNIQUE: MRI of the brain was performed without gadolinium.    COMPARISON: CT head 12/23/2019    FINDINGS:  There is no abnormal T2 prolongation signal in the white matter.    There is no acute parenchymal hemorrhage, parenchymal mass, mass effect or midline shift. There is no extra-axial fluid collection.  There is no hydrocephalus.  There is no acute infarct. Partial empty sella    Mucosal thickening paranasal sinuses. Partial opacification left mastoid air cells    IMPRESSION:  No acute intracranial hemorrhage or acute infarct.      BERKLEY BARNETT M.D., ATTENDING RADIOLOGIST  This document has been electronically signed. Dec 24 2019  1:15PM              ASSESSMENT AND PLAN:      PRESENTED WITH RIGHT SIDED WEAKNESS/SLURRED SPEECH  CW CONVERSION DISORDER; VERY FLUCTUATING DEFICIT AND LACK OF EFFORT  WITH MRI OF THE HEAD SHOWING NO ACUTE CVA DESPITE SIGNIFICANT NEURO DEFICIT.  SIMILAR PRESENTATION TWICE BEFORE, RECEIVED TPA INTIALLY.  EEG WAS ALSO UNREMARKABLE  STABLE CEREBRAL ANEURYSM  AF    SOCIAL SERVICE EVAL IN PROGRESS  NO FURTHER NEURO BARGER.  RESUME AC.  PSYCH EVAL APPRECIATED.  Physical therapy evaluation.  OOB to chair/ambulation with assistance only.  Pain is accessed and addressed.  Would continue to follow.

## 2019-12-26 NOTE — PROGRESS NOTE ADULT - ASSESSMENT
50 yo male with PMHx significant of CAD s/p BMS to RCA (8/2019), RUL subsegmental PE (6/2019, on Eliquis), hx of NSTEMI and PAF s/p ex-lap omentopexy and plication for perforated antral ulcer (5/2019), osteomyelitis s/p debridement, CVA s/p tPA (8/2019), HTN and DM2 (not on insulin) presenting to the ED with right sided weakness, facial droop, and slurred speech.    TIA? s/p CVA  - Neurology evaluation appreciated.  Follow recs  - MR head negative.   - Psyche eval noted  - He does have a history of AF, and may have been off his anticoagulation in the short term  - He remained in NSR/SB on tele.  Can discontinue monitor    Afib  - Maintaining SR on tele  - Amiodarone was previously discontinued as it may have been causing his thyroid issues.  - Continue with lopressor BID with hold parameters  - Continue Eliquis BID  - He had a recent TTE on 8/2019 showed normal LVSF, EF 65%, normal RVSF and mild-mod MR.  No need to repeat TTE.    - Monitor electrolytes, replete to keep K>4 and Mag>2    CAD s/p PCI  - He has no symptoms suggestive of ischemia  - Continue Plavix 75 QD  - Continue Statin  - Continue Imdur  - Continue Losartan    Will follow with you    Henrietta Kim DNP, NP-C  Cardiology   Spectra #[phone]

## 2019-12-26 NOTE — PROGRESS NOTE ADULT - SUBJECTIVE AND OBJECTIVE BOX
[facility] -- Bolivar Carbajal, El, Brittany, Reynaldo Sweeney Savella, Goodger  Office # [phone]    Follow Up:  Neuro Symptoms    Subjective/Observations: Still c/o right sided weakness.  No dizziness/lightheadedness.  no respiratory or cardiac discomfort    REVIEW OF SYSTEMS: All other review of systems is negative unless indicated above  PAST MEDICAL & SURGICAL HISTORY:  Cerebrovascular accident  CAD S/P percutaneous coronary angioplasty  Osteomyelitis: s/p debridement  History of non-ST elevation myocardial infarction (NSTEMI)  Pulmonary embolism  Perforated gastric ulcer: s/p emergent ex-lap omentopexy and plication 6/2019  BPH (benign prostatic hyperplasia)  Hypertension  Afib  Diabetes mellitus with no complication  Diabetes  Traumatic amputation of left foot, initial encounter  Perforated gastric ulcer  H/O abdominal surgery    MEDICATIONS  (STANDING):  apixaban 5 milliGRAM(s) Oral every 12 hours  atorvastatin 80 milliGRAM(s) Oral at bedtime  bethanechol 25 milliGRAM(s) Oral three times a day  cefuroxime   Tablet 250 milliGRAM(s) Oral every 12 hours  clopidogrel Tablet 75 milliGRAM(s) Oral daily  dextrose 5%. 1000 milliLiter(s) (50 mL/Hr) IV Continuous <Continuous>  dextrose 50% Injectable 12.5 Gram(s) IV Push once  dextrose 50% Injectable 25 Gram(s) IV Push once  dextrose 50% Injectable 25 Gram(s) IV Push once  insulin lispro (HumaLOG) corrective regimen sliding scale   SubCutaneous three times a day before meals  insulin lispro (HumaLOG) corrective regimen sliding scale   SubCutaneous at bedtime  isosorbide   mononitrate ER Tablet (IMDUR) 30 milliGRAM(s) Oral daily  losartan 25 milliGRAM(s) Oral daily  methimazole 10 milliGRAM(s) Oral daily  metoprolol tartrate 25 milliGRAM(s) Oral every 12 hours  pantoprazole    Tablet 40 milliGRAM(s) Oral before breakfast  predniSONE   Tablet 5 milliGRAM(s) Oral daily  sucralfate 1 Gram(s) Oral four times a day  tamsulosin 0.4 milliGRAM(s) Oral at bedtime    MEDICATIONS  (PRN):  acetaminophen   Tablet .. 650 milliGRAM(s) Oral every 6 hours PRN Temp greater or equal to 38C (100.4F), Mild Pain (1 - 3)  dextrose 40% Gel 15 Gram(s) Oral once PRN Blood Glucose LESS THAN 70 milliGRAM(s)/deciLiter  glucagon  Injectable 1 milliGRAM(s) IntraMuscular once PRN Glucose <70 milliGRAM(s)/deciLiter    Allergies    fish (Hives)  No Known Drug Allergies    Intolerances    Vital Signs Last 24 Hrs  T(C): 36.5 (26 Dec 2019 07:45), Max: 36.8 (25 Dec 2019 15:41)  T(F): 97.7 (26 Dec 2019 07:45), Max: 98.2 (25 Dec 2019 15:41)  HR: 55 (26 Dec 2019 07:45) (53 - 70)  BP: 136/76 (26 Dec 2019 07:45) (119/72 - 144/83)  BP(mean): --  RR: 18 (26 Dec 2019 07:45) (16 - 18)  SpO2: 94% (26 Dec 2019 07:45) (94% - 96%)  I&O's Summary    25 Dec 2019 07:01  -  26 Dec 2019 07:00  --------------------------------------------------------  IN: 0 mL / OUT: 5000 mL / NET: -5000 mL    PHYSICAL EXAM:  TELE: SB 40's-50's  Constitutional: NAD, awake and alert, obese  HEENT: Moist Mucous Membranes, Anicteric  Pulmonary: Non-labored, breath sounds are clear bilaterally, No wheezing, rales or rhonchi.  Cardiovascular: Regular, S1 and S2, + murmurs.  No rubs, gallops or clicks  Gastrointestinal: Bowel Sounds present, soft, nontender.   Lymph: No peripheral edema. No lymphadenopathy.  Extr: Right sided weakness?  : jacques  Skin: No visible rashes or ulcers.  Psych:  Mood & affect appropriate     LABS: All Labs Reviewed:                        9.7    6.81  )-----------( 253      ( 24 Dec 2019 06:19 )             31.4                         10.9   8.92  )-----------( 334      ( 23 Dec 2019 15:53 )             35.6     24 Dec 2019 06:19    139    |  107    |  15     ----------------------------<  245    3.7     |  25     |  0.97   23 Dec 2019 15:53    137    |  102    |  17     ----------------------------<  378    3.9     |  27     |  1.10     Ca    8.3        24 Dec 2019 06:19  Ca    9.3        23 Dec 2019 15:53  Mg     2.1       24 Dec 2019 06:19    TPro  7.6    /  Alb  3.3    /  TBili  0.2    /  DBili  x      /  AST  18     /  ALT  27     /  AlkPhos  170    23 Dec 2019 15:53    < from: TTE Echo Doppler w/o Cont (08.15.19 @ 16:33) >     EXAM:  ECHO TTE WO CON COMP W DOPPLR      PROCEDURE DATE:  08/15/2019      INTERPRETATION:  INDICATION: Atrial fibrillation    Blood Pressure 128/64    Height 187.9 cm     Weight 91 kg       BSA 2.18   sq m    Dimensions:    LA 3.7       Normal Values: 2.0 - 4.0 cm    Ao 3.6        Normal Values: 2.0 - 3.8 cm  SEPTUM 1.6       Normal Values: 0.6 - 1.2 cm  PWT 1.4       Normal Values: 0.6 - 1.1 cm  LVIDd 5.3         Normal Values: 3.0 - 5.6 cm  LVIDs 2.7         Normal Values: 1.8 - 4.0 cm    OBSERVATIONS:  Technically difficult study  Mitral Valve: normal, mild to moderate MR.  Aortic Valve/Aorta: Aortic valve is not well-visualized, likely normal   function.  Tricuspid Valve: normal with trace TR.  Pulmonic Valve: normal  Left Atrium: Enlarged  Right Atrium: normal  Left Ventricle: normal LV size and systolic function, estimated LVEF of   65%. Left ventricular hypertrophy  Right Ventricle: Grossly normal size and systolic function.  Pericardium/Pleura: normal, no significant pericardial effusion.  LV Diastolic Function: normal    Conclusion:   Technically difficult study  Normal left ventricular internal dimensions and systolic function,   estimated LVEF of 65%.   Grossly normal RV size and systolic function.   Left atrial enlargement  Aortic valve is not well-visualized, likely normal function.   Mild to moderate MR   Trace TR.    No significant pericardial effusion.     MIROSLAVA CASTAÑEDA   This document has been electronically signed. Aug 16 2019  8:25AM     < end of copied text >    < from: MR Head No Cont (12.24.19 @ 13:08) >    EXAM:  MR BRAIN                          PROCEDURE DATE:  12/24/2019      INTERPRETATION:  CLINICAL STATEMENT: Right-sided numbness    TECHNIQUE: MRI of the brain was performed without gadolinium.    COMPARISON: CT head 12/23/2019    FINDINGS:  There is no abnormal T2 prolongation signal in the white matter.    There is no acute parenchymal hemorrhage, parenchymal mass, mass effect or midline shift. There is no extra-axial fluid collection.  There is no hydrocephalus.  There is no acute infarct. Partial empty sella    Mucosal thickening paranasal sinuses. Partial opacification left mastoid air cells    IMPRESSION:  No acute intracranial hemorrhage or acute infarct.    BERKLEY BARNETT M.D., ATTENDING RADIOLOGIST  This document has been electronically signed. Dec 24 2019  1:15PM     < end of copied text >    < from: Xray Chest 1 View AP/PA (12.23.19 @ 16:16) >    EXAM:  XR CHEST AP OR PA 1V                            PROCEDURE DATE:  12/23/2019      INTERPRETATION:  Clinical information: Right-sided facial droop    Portable study, 4:11 PM    Comparison study dated 10/25/2019    Cardiac monitor leads present    Borderline cardiomegaly. No sign of lobar consolidation vascular congestion or effusion. Hilar regions, mediastinal contours intact. No acute osseous abnormalities.    IMPRESSION: See above report    LETY LONDONO M.D.,ATTENDINGRADIOLOGIST  This document has been electronically signed. Dec 23 2019  4:20PM      < end of copied text >

## 2019-12-26 NOTE — PROGRESS NOTE ADULT - SUBJECTIVE AND OBJECTIVE BOX
Patient is a 49y old  Male who presents with a chief complaint of R sided numbness (26 Dec 2019 13:08)      INTERVAL HPI/OVERNIGHT EVENTS: Patient seen and examined. NAD. No complaints.    Vital Signs Last 24 Hrs  T(C): 36.8 (26 Dec 2019 11:40), Max: 36.8 (25 Dec 2019 15:41)  T(F): 98.2 (26 Dec 2019 11:40), Max: 98.2 (25 Dec 2019 15:41)  HR: 65 (26 Dec 2019 11:40) (53 - 70)  BP: 153/91 (26 Dec 2019 11:40) (119/72 - 153/91)  BP(mean): --  RR: 18 (26 Dec 2019 11:40) (16 - 18)  SpO2: 96% (26 Dec 2019 11:40) (94% - 96%)              CAPILLARY BLOOD GLUCOSE      POCT Blood Glucose.: 271 mg/dL (26 Dec 2019 11:36)  POCT Blood Glucose.: 233 mg/dL (26 Dec 2019 07:34)  POCT Blood Glucose.: 305 mg/dL (25 Dec 2019 21:35)  POCT Blood Glucose.: 266 mg/dL (25 Dec 2019 16:43)              acetaminophen   Tablet .. 650 milliGRAM(s) Oral every 6 hours PRN  apixaban 5 milliGRAM(s) Oral every 12 hours  atorvastatin 80 milliGRAM(s) Oral at bedtime  bethanechol 25 milliGRAM(s) Oral three times a day  cefuroxime   Tablet 250 milliGRAM(s) Oral every 12 hours  clopidogrel Tablet 75 milliGRAM(s) Oral daily  dextrose 40% Gel 15 Gram(s) Oral once PRN  dextrose 5%. 1000 milliLiter(s) IV Continuous <Continuous>  dextrose 50% Injectable 12.5 Gram(s) IV Push once  dextrose 50% Injectable 25 Gram(s) IV Push once  dextrose 50% Injectable 25 Gram(s) IV Push once  glucagon  Injectable 1 milliGRAM(s) IntraMuscular once PRN  insulin glargine Injectable (LANTUS) 15 Unit(s) SubCutaneous two times a day  insulin lispro (HumaLOG) corrective regimen sliding scale   SubCutaneous three times a day before meals  insulin lispro (HumaLOG) corrective regimen sliding scale   SubCutaneous at bedtime  isosorbide   mononitrate ER Tablet (IMDUR) 30 milliGRAM(s) Oral daily  losartan 25 milliGRAM(s) Oral daily  methimazole 10 milliGRAM(s) Oral daily  metoprolol tartrate 25 milliGRAM(s) Oral every 12 hours  pantoprazole    Tablet 40 milliGRAM(s) Oral before breakfast  predniSONE   Tablet 5 milliGRAM(s) Oral daily  sucralfate 1 Gram(s) Oral four times a day  tamsulosin 0.4 milliGRAM(s) Oral at bedtime              REVIEW OF SYSTEMS:  CONSTITUTIONAL: No fever, no weight loss, or no fatigue  NECK: No pain, no stiffness  RESPIRATORY: No cough, no wheezing, no chills, no hemoptysis, No shortness of breath  CARDIOVASCULAR: No chest pain, no palpitations, no dizziness, no leg swelling  GASTROINTESTINAL: No abdominal pain. No nausea, no vomiting, no hematemesis; No diarrhea, no constipation. No melena, no hematochezia.  GENITOURINARY: No dysuria, no frequency, no hematuria, no incontinence  NEUROLOGICAL: No headaches, no loss of strength, no numbness, no tremors  SKIN: No itching, no burning  MUSCULOSKELETAL: No joint pain, no swelling; No muscle, no back, no extremity pain  PSYCHIATRIC: No depression, no mood swings,   HEME/LYMPH: No easy bruising, no bleeding gums  ALLERY AND IMMUNOLOGIC: No hives       Consultant(s) Notes Reviewed:  [X] YES  [ ] NO    PHYSICAL EXAM:  GENERAL: NAD  HEAD:  Atraumatic, Normocephalic  EYES: EOMI, PERRLA, conjunctiva and sclera clear  ENMT: No tonsillar erythema, exudates, or enlargement; Moist mucous membranes  NECK: Supple, No JVD  NERVOUS SYSTEM:  Awake & alert  CHEST/LUNG: Clear to auscultation bilaterally; No rales, rhonchi, wheezing,  HEART: Regular rate and rhythm  ABDOMEN: Soft, Nontender, Nondistended; Bowel sounds present  EXTREMITIES:  No clubbing, cyanosis, or edema  LYMPH: No lymphadenopathy noted  SKIN: No rashes      Advanced care planning discussed with patient/family [X] YES   [ ] NO    Advanced care planning discussed with patient/family. Advanced care planning forms reviewed/discussed/completed. 20 minutes spent.

## 2019-12-26 NOTE — PROGRESS NOTE ADULT - PROBLEM SELECTOR PLAN 1
Patient admitted today that he is faking symptoms to get into SNF  No evidence of CVA on MRI  Malingering vs Factitious vs Adjustment d/o  Psych f/u  Continue eliquis and plavix  physical therapy  neuro f/u  Further work-up/management pending clinical course.

## 2019-12-26 NOTE — CONSULT NOTE ADULT - SUBJECTIVE AND OBJECTIVE BOX
Patient is a 49y old  Male who presents with a chief complaint of R sided numbness (26 Dec 2019 08:05)      Reason For Consult:     HPI:  48 yo M with hx CVA in past, is on Eliquis (unclear what time last took), also h/o AF, HTN, hyperthyroid, DM, BPH - came in c/o sudden onset R sided facial droop and weakness to R side of body. Pt with hx same symptoms in August - cta / MRI were negative at that time. Pt now has recurrent symptoms -- onset ~ 30 min PTA. No cp/sob/palp. Pt states feels overall weak. No fall/ recent trauma. No aggravating/alleviating factors. No other injuries or other complaints. (23 Dec 2019 22:25)      PAST MEDICAL & SURGICAL HISTORY:  Cerebrovascular accident  CAD S/P percutaneous coronary angioplasty  Osteomyelitis: s/p debridement  History of non-ST elevation myocardial infarction (NSTEMI)  Pulmonary embolism  Perforated gastric ulcer: s/p emergent ex-lap omentopexy and plication 6/2019  BPH (benign prostatic hyperplasia)  Hypertension  Afib  Diabetes mellitus with no complication  Diabetes  Traumatic amputation of left foot, initial encounter  Perforated gastric ulcer  H/O abdominal surgery      FAMILY HISTORY:  FH: stroke: Father  FH: coronary artery disease: Father  FH: pulmonary embolism: Mother        Social History:    MEDICATIONS  (STANDING):  apixaban 5 milliGRAM(s) Oral every 12 hours  atorvastatin 80 milliGRAM(s) Oral at bedtime  bethanechol 25 milliGRAM(s) Oral three times a day  cefuroxime   Tablet 250 milliGRAM(s) Oral every 12 hours  clopidogrel Tablet 75 milliGRAM(s) Oral daily  dextrose 5%. 1000 milliLiter(s) (50 mL/Hr) IV Continuous <Continuous>  dextrose 50% Injectable 12.5 Gram(s) IV Push once  dextrose 50% Injectable 25 Gram(s) IV Push once  dextrose 50% Injectable 25 Gram(s) IV Push once  insulin glargine Injectable (LANTUS) 15 Unit(s) SubCutaneous two times a day  insulin lispro (HumaLOG) corrective regimen sliding scale   SubCutaneous three times a day before meals  insulin lispro (HumaLOG) corrective regimen sliding scale   SubCutaneous at bedtime  isosorbide   mononitrate ER Tablet (IMDUR) 30 milliGRAM(s) Oral daily  losartan 25 milliGRAM(s) Oral daily  methimazole 10 milliGRAM(s) Oral daily  metoprolol tartrate 25 milliGRAM(s) Oral every 12 hours  pantoprazole    Tablet 40 milliGRAM(s) Oral before breakfast  predniSONE   Tablet 5 milliGRAM(s) Oral daily  sucralfate 1 Gram(s) Oral four times a day  tamsulosin 0.4 milliGRAM(s) Oral at bedtime    MEDICATIONS  (PRN):  acetaminophen   Tablet .. 650 milliGRAM(s) Oral every 6 hours PRN Temp greater or equal to 38C (100.4F), Mild Pain (1 - 3)  dextrose 40% Gel 15 Gram(s) Oral once PRN Blood Glucose LESS THAN 70 milliGRAM(s)/deciLiter  glucagon  Injectable 1 milliGRAM(s) IntraMuscular once PRN Glucose <70 milliGRAM(s)/deciLiter        T(C): 36.8 (12-26-19 @ 11:40), Max: 36.8 (12-25-19 @ 15:41)  HR: 65 (12-26-19 @ 11:40) (53 - 70)  BP: 153/91 (12-26-19 @ 11:40) (119/72 - 153/91)  RR: 18 (12-26-19 @ 11:40) (16 - 18)  SpO2: 96% (12-26-19 @ 11:40) (94% - 96%)  Wt(kg): --    PHYSICAL EXAM:  GENERAL: NAD, well-groomed, well-developed  HEAD:  Atraumatic, Normocephalic  NECK: Supple, No JVD, Normal thyroid  CHEST/LUNG: Clear to percussion bilaterally; No rales, rhonchi, wheezing, or rubs  HEART: Regular rate and rhythm; No murmurs, rubs, or gallops  ABDOMEN: Soft, Nontender, Nondistended; Bowel sounds present  EXTREMITIES:  2+ Peripheral Pulses, No clubbing, cyanosis, or edema  SKIN: No rashes or lesions    CAPILLARY BLOOD GLUCOSE      POCT Blood Glucose.: 271 mg/dL (26 Dec 2019 11:36)  POCT Blood Glucose.: 233 mg/dL (26 Dec 2019 07:34)  POCT Blood Glucose.: 305 mg/dL (25 Dec 2019 21:35)  POCT Blood Glucose.: 266 mg/dL (25 Dec 2019 16:43)          CMP:      Thyroid Function Tests:      Diabetes Tests:       Radiology:

## 2019-12-27 ENCOUNTER — TRANSCRIPTION ENCOUNTER (OUTPATIENT)
Age: 49
End: 2019-12-27

## 2019-12-27 DIAGNOSIS — E11.9 TYPE 2 DIABETES MELLITUS WITHOUT COMPLICATIONS: ICD-10-CM

## 2019-12-27 LAB
DRUG SCREEN, SERUM: SIGNIFICANT CHANGE UP
T3FREE SERPL-MCNC: 2.62 PG/ML — SIGNIFICANT CHANGE UP (ref 1.8–4.6)
T4 FREE SERPL-MCNC: 1.3 NG/DL — SIGNIFICANT CHANGE UP (ref 0.9–1.8)
TSH SERPL-MCNC: 1.28 UIU/ML — SIGNIFICANT CHANGE UP (ref 0.36–3.74)

## 2019-12-27 PROCEDURE — 99232 SBSQ HOSP IP/OBS MODERATE 35: CPT

## 2019-12-27 RX ORDER — INSULIN LISPRO 100/ML
7 VIAL (ML) SUBCUTANEOUS
Qty: 0 | Refills: 0 | DISCHARGE

## 2019-12-27 RX ADMIN — ATORVASTATIN CALCIUM 80 MILLIGRAM(S): 80 TABLET, FILM COATED ORAL at 21:35

## 2019-12-27 RX ADMIN — Medication 1 GRAM(S): at 00:21

## 2019-12-27 RX ADMIN — PANTOPRAZOLE SODIUM 40 MILLIGRAM(S): 20 TABLET, DELAYED RELEASE ORAL at 05:07

## 2019-12-27 RX ADMIN — Medication 6: at 12:17

## 2019-12-27 RX ADMIN — Medication 25 MILLIGRAM(S): at 21:35

## 2019-12-27 RX ADMIN — Medication 25 MILLIGRAM(S): at 14:07

## 2019-12-27 RX ADMIN — INSULIN GLARGINE 15 UNIT(S): 100 INJECTION, SOLUTION SUBCUTANEOUS at 22:12

## 2019-12-27 RX ADMIN — Medication 25 MILLIGRAM(S): at 17:30

## 2019-12-27 RX ADMIN — Medication 1 GRAM(S): at 17:30

## 2019-12-27 RX ADMIN — Medication 1 GRAM(S): at 12:18

## 2019-12-27 RX ADMIN — CLOPIDOGREL BISULFATE 75 MILLIGRAM(S): 75 TABLET, FILM COATED ORAL at 12:17

## 2019-12-27 RX ADMIN — LOSARTAN POTASSIUM 25 MILLIGRAM(S): 100 TABLET, FILM COATED ORAL at 05:07

## 2019-12-27 RX ADMIN — APIXABAN 5 MILLIGRAM(S): 2.5 TABLET, FILM COATED ORAL at 17:30

## 2019-12-27 RX ADMIN — ISOSORBIDE MONONITRATE 30 MILLIGRAM(S): 60 TABLET, EXTENDED RELEASE ORAL at 12:17

## 2019-12-27 RX ADMIN — Medication 4: at 08:17

## 2019-12-27 RX ADMIN — APIXABAN 5 MILLIGRAM(S): 2.5 TABLET, FILM COATED ORAL at 05:07

## 2019-12-27 RX ADMIN — Medication 250 MILLIGRAM(S): at 17:30

## 2019-12-27 RX ADMIN — INSULIN GLARGINE 15 UNIT(S): 100 INJECTION, SOLUTION SUBCUTANEOUS at 08:17

## 2019-12-27 RX ADMIN — Medication 1 GRAM(S): at 05:06

## 2019-12-27 RX ADMIN — Medication 5 MILLIGRAM(S): at 05:07

## 2019-12-27 RX ADMIN — Medication 250 MILLIGRAM(S): at 05:07

## 2019-12-27 RX ADMIN — Medication 25 MILLIGRAM(S): at 05:07

## 2019-12-27 RX ADMIN — Medication 25 MILLIGRAM(S): at 05:06

## 2019-12-27 RX ADMIN — TAMSULOSIN HYDROCHLORIDE 0.4 MILLIGRAM(S): 0.4 CAPSULE ORAL at 21:35

## 2019-12-27 RX ADMIN — Medication 4: at 17:29

## 2019-12-27 NOTE — PROGRESS NOTE ADULT - ASSESSMENT
50 yo male with PMHx significant of CAD s/p BMS to RCA (8/2019), RUL subsegmental PE (6/2019, on Eliquis), hx of NSTEMI and PAF s/p ex-lap omentopexy and plication for perforated antral ulcer (5/2019), osteomyelitis s/p debridement, CVA s/p tPA (8/2019), HTN and DM2 (not on insulin) presenting to the ED with right sided weakness, facial droop, and slurred speech.    TIA? s/p CVA  - Neurology evaluation appreciated.  Follow recs  - MR head negative.   - Psyche eval noted  - He does have a history of AF, and may have been off his anticoagulation in the short term  - He remained in NSR/SB on tele.  Can discontinue monitor    Afib  - Maintaining SR while on tele  - Amiodarone was previously discontinued as it may have been causing his thyroid issues.  - Continue with lopressor BID with hold parameters  - Continue Eliquis BID.  Monitor for s/s bleeding  - He had a recent TTE on 8/2019 showed normal LVSF, EF 65%, normal RVSF and mild-mod MR.  No need to repeat TTE.    - Monitor electrolytes, replete to keep K>4 and Mag>2    CAD s/p PCI  - He has no symptoms suggestive of ischemia  - Continue Plavix 75 QD  - Continue Statin  - Continue Imdur  - Continue Losartan    Will follow with you  No contraindication for d/c from cardiac standpoint    Henrietta Kim DNP, NP-C  Cardiology   Spectra #[phone]

## 2019-12-27 NOTE — DISCHARGE NOTE PROVIDER - NSDCFUADDINST_GEN_ALL_CORE_FT
Wound Care instructions  1) remove dressing and cleanse wound with normal saline and then pat dry  2) Apply bacitracin to nail bed and then apply a bandaid  3) Change dressing daily and monitor for signs of infection  4) Patient to f/u with Dr Hale or Perez within 1 week of d/c.

## 2019-12-27 NOTE — DISCHARGE NOTE PROVIDER - NSDCCPCAREPLAN_GEN_ALL_CORE_FT
PRINCIPAL DISCHARGE DIAGNOSIS  Diagnosis: Weakness of right upper extremity  Assessment and Plan of Treatment: continue rehab        SECONDARY DISCHARGE DIAGNOSES  Diagnosis: Facial droop  Assessment and Plan of Treatment: resolved

## 2019-12-27 NOTE — DISCHARGE NOTE PROVIDER - CARE PROVIDER_API CALL
Grisel Murray)  Internal Medicine  [address] [place] [address]  Phone: [phone]  Fax: [phone]  Established Patient  Follow Up Time: 1 week

## 2019-12-27 NOTE — PROGRESS NOTE ADULT - SUBJECTIVE AND OBJECTIVE BOX
Patient is a 49y old  Male who presents with a chief complaint of R sided numbness (27 Dec 2019 08:42)      INTERVAL HPI/OVERNIGHT EVENTS: Patient seen and examined. NAD. No complaints.    Vital Signs Last 24 Hrs  T(C): 37 (27 Dec 2019 07:34), Max: 37 (27 Dec 2019 07:34)  T(F): 98.6 (27 Dec 2019 07:34), Max: 98.6 (27 Dec 2019 07:34)  HR: 51 (27 Dec 2019 07:34) (51 - 65)  BP: 130/78 (27 Dec 2019 07:34) (117/74 - 153/91)  BP(mean): --  RR: 18 (27 Dec 2019 07:34) (18 - 18)  SpO2: 95% (27 Dec 2019 07:34) (95% - 97%)              CAPILLARY BLOOD GLUCOSE      POCT Blood Glucose.: 201 mg/dL (27 Dec 2019 08:09)  POCT Blood Glucose.: 324 mg/dL (26 Dec 2019 21:09)  POCT Blood Glucose.: 284 mg/dL (26 Dec 2019 16:54)              acetaminophen   Tablet .. 650 milliGRAM(s) Oral every 6 hours PRN  apixaban 5 milliGRAM(s) Oral every 12 hours  atorvastatin 80 milliGRAM(s) Oral at bedtime  bethanechol 25 milliGRAM(s) Oral three times a day  cefuroxime   Tablet 250 milliGRAM(s) Oral every 12 hours  clopidogrel Tablet 75 milliGRAM(s) Oral daily  dextrose 40% Gel 15 Gram(s) Oral once PRN  dextrose 5%. 1000 milliLiter(s) IV Continuous <Continuous>  dextrose 50% Injectable 12.5 Gram(s) IV Push once  dextrose 50% Injectable 25 Gram(s) IV Push once  dextrose 50% Injectable 25 Gram(s) IV Push once  glucagon  Injectable 1 milliGRAM(s) IntraMuscular once PRN  insulin glargine Injectable (LANTUS) 15 Unit(s) SubCutaneous two times a day  insulin lispro (HumaLOG) corrective regimen sliding scale   SubCutaneous three times a day before meals  insulin lispro (HumaLOG) corrective regimen sliding scale   SubCutaneous at bedtime  isosorbide   mononitrate ER Tablet (IMDUR) 30 milliGRAM(s) Oral daily  losartan 25 milliGRAM(s) Oral daily  methimazole 5 milliGRAM(s) Oral daily  metoprolol tartrate 25 milliGRAM(s) Oral every 12 hours  pantoprazole    Tablet 40 milliGRAM(s) Oral before breakfast  predniSONE   Tablet 5 milliGRAM(s) Oral daily  sucralfate 1 Gram(s) Oral four times a day  tamsulosin 0.4 milliGRAM(s) Oral at bedtime              REVIEW OF SYSTEMS:  CONSTITUTIONAL: No fever, no weight loss, or no fatigue  NECK: No pain, no stiffness  RESPIRATORY: No cough, no wheezing, no chills, no hemoptysis, No shortness of breath  CARDIOVASCULAR: No chest pain, no palpitations, no dizziness, no leg swelling  GASTROINTESTINAL: No abdominal pain. No nausea, no vomiting, no hematemesis; No diarrhea, no constipation. No melena, no hematochezia.  GENITOURINARY: No dysuria, no frequency, no hematuria, no incontinence  NEUROLOGICAL: No headaches, no loss of strength, no numbness, no tremors  SKIN: No itching, no burning  MUSCULOSKELETAL: No joint pain, no swelling; No muscle, no back, no extremity pain  PSYCHIATRIC: No depression, no mood swings,   HEME/LYMPH: No easy bruising, no bleeding gums  ALLERY AND IMMUNOLOGIC: No hives       Consultant(s) Notes Reviewed:  [X] YES  [ ] NO    PHYSICAL EXAM:  GENERAL: NAD  HEAD:  Atraumatic, Normocephalic  EYES: EOMI, PERRLA, conjunctiva and sclera clear  ENMT: No tonsillar erythema, exudates, or enlargement; Moist mucous membranes  NECK: Supple, No JVD  NERVOUS SYSTEM:  Awake & alert  CHEST/LUNG: Clear to auscultation bilaterally; No rales, rhonchi, wheezing,  HEART: Regular rate and rhythm  ABDOMEN: Soft, Nontender, Nondistended; Bowel sounds present  EXTREMITIES:  No clubbing, cyanosis, or edema  LYMPH: No lymphadenopathy noted  SKIN: No rashes      Advanced care planning discussed with patient/family [X] YES   [ ] NO    Advanced care planning discussed with patient/family. Advanced care planning forms reviewed/discussed/completed. 20 minutes spent.

## 2019-12-27 NOTE — DISCHARGE NOTE PROVIDER - HOSPITAL COURSE
48 yo M with hx CVA in past, is on Eliquis (unclear what time last took), also h/o AF, HTN, hyperthyroid, DM, BPH - came in c/o sudden onset R sided facial droop and weakness to R side of body. Pt with hx same symptoms in August - cta / MRI were negative at that time. Pt now has recurrent symptoms -- onset ~ 30 min PTA. No cp/sob/palp. Pt states feels overall weak. No fall/ recent trauma. No aggravating/alleviating factors. No other injuries or other complaints.        Work-up negative    Patient was malingering to get into SNF        >35 minutes spent on discharge

## 2019-12-27 NOTE — PROGRESS NOTE ADULT - SUBJECTIVE AND OBJECTIVE BOX
CAPILLARY BLOOD GLUCOSE      POCT Blood Glucose.: 324 mg/dL (26 Dec 2019 21:09)  POCT Blood Glucose.: 284 mg/dL (26 Dec 2019 16:54)  POCT Blood Glucose.: 271 mg/dL (26 Dec 2019 11:36)      Vital Signs Last 24 Hrs  T(C): 37 (27 Dec 2019 07:34), Max: 37 (27 Dec 2019 07:34)  T(F): 98.6 (27 Dec 2019 07:34), Max: 98.6 (27 Dec 2019 07:34)  HR: 51 (27 Dec 2019 07:34) (51 - 65)  BP: 130/78 (27 Dec 2019 07:34) (117/74 - 153/91)  BP(mean): --  RR: 18 (27 Dec 2019 07:34) (18 - 18)  SpO2: 95% (27 Dec 2019 07:34) (94% - 97%)    General: WN/WD NAD  Respiratory: CTA B/L  CV: RRR, S1S2, no murmurs, rubs or gallops  Abdominal: Soft, NT, ND +BS, Last BM  Extremities: No edema, + peripheral pulses    Hemoglobin A1C, Whole Blood: 8.4 % (12-24 @ 07:53)           atorvastatin 80 milliGRAM(s) Oral at bedtime  dextrose 40% Gel 15 Gram(s) Oral once PRN  dextrose 50% Injectable 12.5 Gram(s) IV Push once  dextrose 50% Injectable 25 Gram(s) IV Push once  dextrose 50% Injectable 25 Gram(s) IV Push once  glucagon  Injectable 1 milliGRAM(s) IntraMuscular once PRN  insulin glargine Injectable (LANTUS) 15 Unit(s) SubCutaneous two times a day  insulin lispro (HumaLOG) corrective regimen sliding scale   SubCutaneous three times a day before meals  insulin lispro (HumaLOG) corrective regimen sliding scale   SubCutaneous at bedtime  methimazole 10 milliGRAM(s) Oral daily  predniSONE   Tablet 5 milliGRAM(s) Oral daily

## 2019-12-27 NOTE — DISCHARGE NOTE PROVIDER - NSDCMRMEDTOKEN_GEN_ALL_CORE_FT
apixaban 5 mg oral tablet: 1 tab(s) orally 2 times a day  atorvastatin 80 mg oral tablet: 1 tab(s) orally once a day (at bedtime)  home/hospital  Francesaglmarcelo MaresikPen 100 units/mL subcutaneous solution: 15 unit(s) subcutaneous every 12 hours  bethanechol 25 mg oral tablet: 1 tab(s) orally 3 times a day  clopidogrel 75 mg oral tablet: 1 tab(s) orally once a day  isosorbide mononitrate 30 mg oral tablet, extended release: 1 tab(s) orally once a day (in the morning)  losartan 25 mg oral tablet: 1 tab(s) orally once a day, hold for SBP &lt; 100  metFORMIN 1000 mg oral tablet: 1 tab(s) orally every 12 hours   methIMAzole 10 mg oral tablet: 1 tab(s) orally once a day  metoprolol tartrate 25 mg oral tablet: 1 tab(s) orally 2 times a day  omeprazole 40 mg oral delayed release capsule: 1 cap(s) orally once a day  predniSONE 5 mg oral tablet: 1 tab(s) orally once a day  sucralfate 1 g oral tablet: 1 tab(s) orally 4 times a day  tamsulosin 0.4 mg oral capsule: 1 cap(s) orally once a day (at bedtime)  home apixaban 5 mg oral tablet: 1 tab(s) orally 2 times a day  atorvastatin 80 mg oral tablet: 1 tab(s) orally once a day (at bedtime)  home/hospital  Basaglar KwikPen 100 units/mL subcutaneous solution: 15 unit(s) subcutaneous every 12 hours  bethanechol 25 mg oral tablet: 1 tab(s) orally 3 times a day  clopidogrel 75 mg oral tablet: 1 tab(s) orally once a day  HumaLOG KwikPen 200 units/mL (Concentrated) subcutaneous solution: 9 unit(s) subcutaneous 3 times a day (before meals)  isosorbide mononitrate 30 mg oral tablet, extended release: 1 tab(s) orally once a day (in the morning)  losartan 25 mg oral tablet: 1 tab(s) orally once a day, hold for SBP &lt; 100  metFORMIN 1000 mg oral tablet: 1 tab(s) orally every 12 hours   methIMAzole 10 mg oral tablet: 1 tab(s) orally once a day  metoprolol tartrate 25 mg oral tablet: 1 tab(s) orally 2 times a day  omeprazole 40 mg oral delayed release capsule: 1 cap(s) orally once a day  predniSONE 5 mg oral tablet: 1 tab(s) orally once a day  sucralfate 1 g oral tablet: 1 tab(s) orally 4 times a day  tamsulosin 0.4 mg oral capsule: 1 cap(s) orally once a day (at bedtime)  home apixaban 5 mg oral tablet: 1 tab(s) orally 2 times a day  atorvastatin 80 mg oral tablet: 1 tab(s) orally once a day (at bedtime)  home/hospital  Basaglar KwikPen 100 units/mL subcutaneous solution: 12 unit(s) subcutaneous every 12 hours  bethanechol 25 mg oral tablet: 1 tab(s) orally 3 times a day  clopidogrel 75 mg oral tablet: 1 tab(s) orally once a day  HumaLOG KwikPen 200 units/mL (Concentrated) subcutaneous solution: 7 unit(s) subcutaneous 3 times a day (before meals)  isosorbide mononitrate 30 mg oral tablet, extended release: 1 tab(s) orally once a day (in the morning)  losartan 25 mg oral tablet: 1 tab(s) orally once a day, hold for SBP &lt; 100  metFORMIN 1000 mg oral tablet: 1 tab(s) orally every 12 hours   methIMAzole 10 mg oral tablet: 1 tab(s) orally once a day  metoprolol tartrate 25 mg oral tablet: 1 tab(s) orally 2 times a day  omeprazole 40 mg oral delayed release capsule: 1 cap(s) orally once a day  predniSONE 5 mg oral tablet: 1 tab(s) orally once a day  sucralfate 1 g oral tablet: 1 tab(s) orally 4 times a day  tamsulosin 0.4 mg oral capsule: 1 cap(s) orally once a day (at bedtime)  home apixaban 5 mg oral tablet: 1 tab(s) orally 2 times a day  atorvastatin 80 mg oral tablet: 1 tab(s) orally once a day (at bedtime)  home/hospital  Basaglar KwikPen 100 units/mL subcutaneous solution: 10 unit(s) subcutaneous every 12 hours  bethanechol 25 mg oral tablet: 1 tab(s) orally 3 times a day  clopidogrel 75 mg oral tablet: 1 tab(s) orally once a day  HumaLOG KwikPen 200 units/mL (Concentrated) subcutaneous solution: 7 unit(s) subcutaneous 3 times a day (before meals)  isosorbide mononitrate 30 mg oral tablet, extended release: 1 tab(s) orally once a day (in the morning)  losartan 25 mg oral tablet: 1 tab(s) orally once a day, hold for SBP &lt; 100  metFORMIN 1000 mg oral tablet: 1 tab(s) orally every 12 hours   methIMAzole 10 mg oral tablet: 1 tab(s) orally once a day  metoprolol tartrate 25 mg oral tablet: 1 tab(s) orally 2 times a day  omeprazole 40 mg oral delayed release capsule: 1 cap(s) orally once a day  predniSONE 5 mg oral tablet: 1 tab(s) orally once a day  sucralfate 1 g oral tablet: 1 tab(s) orally 4 times a day  tamsulosin 0.4 mg oral capsule: 1 cap(s) orally once a day (at bedtime)  home apixaban 5 mg oral tablet: 1 tab(s) orally 2 times a day  atorvastatin 80 mg oral tablet: 1 tab(s) orally once a day (at bedtime)  home/hospital  Basaglar KwikPen 100 units/mL subcutaneous solution: 10 unit(s) subcutaneous once a day (morning)  bethanechol 25 mg oral tablet: 1 tab(s) orally 3 times a day  clopidogrel 75 mg oral tablet: 1 tab(s) orally once a day  HumaLOG KwikPen 200 units/mL (Concentrated) subcutaneous solution: 5 unit(s) subcutaneous 3 times a day (before meals)  isosorbide mononitrate 30 mg oral tablet, extended release: 1 tab(s) orally once a day (in the morning)  losartan 25 mg oral tablet: 1 tab(s) orally once a day, hold for SBP &lt; 100  metFORMIN 1000 mg oral tablet: 1 tab(s) orally every 12 hours   methIMAzole 10 mg oral tablet: 1 tab(s) orally once a day  metoprolol tartrate 25 mg oral tablet: 1 tab(s) orally 2 times a day  omeprazole 40 mg oral delayed release capsule: 1 cap(s) orally once a day  predniSONE 5 mg oral tablet: 1 tab(s) orally once a day  sucralfate 1 g oral tablet: 1 tab(s) orally 4 times a day  tamsulosin 0.4 mg oral capsule: 1 cap(s) orally once a day (at bedtime)  home

## 2019-12-27 NOTE — PROGRESS NOTE ADULT - PROBLEM SELECTOR PLAN 1
cont lantus 15 units 2x/day   cont mod dose humalog scale coverage  to add pre-meal standing humalog if post prandial bg numbers remain elevated  cont cons cho diet  goal bg 100-180 in hosp setting

## 2019-12-27 NOTE — PROGRESS NOTE ADULT - SUBJECTIVE AND OBJECTIVE BOX
[facility] -- Bolivar Carbajal, Brittany Gallegos, Reynaldo Sweeney Savella, Goodger  Office # [phone]    Follow Up:  Neuro Symptoms    Subjective/Observations: No complaints.  Awaiting Александр    REVIEW OF SYSTEMS: All other review of systems is negative unless indicated above  PAST MEDICAL & SURGICAL HISTORY:  Cerebrovascular accident  CAD S/P percutaneous coronary angioplasty  Osteomyelitis: s/p debridement  History of non-ST elevation myocardial infarction (NSTEMI)  Pulmonary embolism  Perforated gastric ulcer: s/p emergent ex-lap omentopexy and plication 6/2019  BPH (benign prostatic hyperplasia)  Hypertension  Afib  Diabetes mellitus with no complication  Diabetes  Traumatic amputation of left foot, initial encounter  Perforated gastric ulcer  H/O abdominal surgery    MEDICATIONS  (STANDING):  apixaban 5 milliGRAM(s) Oral every 12 hours  atorvastatin 80 milliGRAM(s) Oral at bedtime  bethanechol 25 milliGRAM(s) Oral three times a day  cefuroxime   Tablet 250 milliGRAM(s) Oral every 12 hours  clopidogrel Tablet 75 milliGRAM(s) Oral daily  dextrose 5%. 1000 milliLiter(s) (50 mL/Hr) IV Continuous <Continuous>  dextrose 50% Injectable 12.5 Gram(s) IV Push once  dextrose 50% Injectable 25 Gram(s) IV Push once  dextrose 50% Injectable 25 Gram(s) IV Push once  insulin glargine Injectable (LANTUS) 15 Unit(s) SubCutaneous two times a day  insulin lispro (HumaLOG) corrective regimen sliding scale   SubCutaneous three times a day before meals  insulin lispro (HumaLOG) corrective regimen sliding scale   SubCutaneous at bedtime  isosorbide   mononitrate ER Tablet (IMDUR) 30 milliGRAM(s) Oral daily  losartan 25 milliGRAM(s) Oral daily  methimazole 5 milliGRAM(s) Oral daily  metoprolol tartrate 25 milliGRAM(s) Oral every 12 hours  pantoprazole    Tablet 40 milliGRAM(s) Oral before breakfast  predniSONE   Tablet 5 milliGRAM(s) Oral daily  sucralfate 1 Gram(s) Oral four times a day  tamsulosin 0.4 milliGRAM(s) Oral at bedtime    MEDICATIONS  (PRN):  acetaminophen   Tablet .. 650 milliGRAM(s) Oral every 6 hours PRN Temp greater or equal to 38C (100.4F), Mild Pain (1 - 3)  dextrose 40% Gel 15 Gram(s) Oral once PRN Blood Glucose LESS THAN 70 milliGRAM(s)/deciLiter  glucagon  Injectable 1 milliGRAM(s) IntraMuscular once PRN Glucose <70 milliGRAM(s)/deciLiter    Allergies    fish (Hives)  No Known Drug Allergies    Intolerances      Vital Signs Last 24 Hrs  T(C): 37 (27 Dec 2019 07:34), Max: 37 (27 Dec 2019 07:34)  T(F): 98.6 (27 Dec 2019 07:34), Max: 98.6 (27 Dec 2019 07:34)  HR: 51 (27 Dec 2019 07:34) (51 - 65)  BP: 130/78 (27 Dec 2019 07:34) (117/74 - 153/91)  BP(mean): --  RR: 18 (27 Dec 2019 07:34) (18 - 18)  SpO2: 95% (27 Dec 2019 07:34) (95% - 97%)  I&O's Summary    26 Dec 2019 07:01  -  27 Dec 2019 07:00  --------------------------------------------------------  IN: 480 mL / OUT: 2950 mL / NET: -2470 mL     PHYSICAL EXAM:  TELE: NSR  Constitutional: NAD, awake and alert, obese  HEENT: Moist Mucous Membranes, Anicteric  Pulmonary: Non-labored, breath sounds are clear bilaterally, No wheezing, rales or rhonchi  Cardiovascular: Regular, S1 and S2, No murmurs, rubs, gallops or clicks  Gastrointestinal: Bowel Sounds present, soft, nontender.   Lymph: No peripheral edema. No lymphadenopathy.  : jacques in situ  Skin: No visible rashes or ulcers.  Psych:  Mood & affect appropriate  LABS: All Labs Reviewed:    < from: TTE Echo Doppler w/o Cont (08.15.19 @ 16:33) >     EXAM:  ECHO TTE WO CON COMP W DOPPLR      PROCEDURE DATE:  08/15/2019      INTERPRETATION:  INDICATION: Atrial fibrillation    Blood Pressure 128/64    Height 187.9 cm     Weight 91 kg       BSA 2.18   sq m    Dimensions:    LA 3.7       Normal Values: 2.0 - 4.0 cm    Ao 3.6        Normal Values: 2.0 - 3.8 cm  SEPTUM 1.6       Normal Values: 0.6 - 1.2 cm  PWT 1.4       Normal Values: 0.6 - 1.1 cm  LVIDd 5.3         Normal Values: 3.0 - 5.6 cm  LVIDs 2.7         Normal Values: 1.8 - 4.0 cm    OBSERVATIONS:  Technically difficult study  Mitral Valve: normal, mild to moderate MR.  Aortic Valve/Aorta: Aortic valve is not well-visualized, likely normal   function.  Tricuspid Valve: normal with trace TR.  Pulmonic Valve: normal  Left Atrium: Enlarged  Right Atrium: normal  Left Ventricle: normal LV size and systolic function, estimated LVEF of   65%. Left ventricular hypertrophy  Right Ventricle: Grossly normal size and systolic function.  Pericardium/Pleura: normal, no significant pericardial effusion.  LV Diastolic Function: normal    Conclusion:   Technically difficult study  Normal left ventricular internal dimensions and systolic function,   estimated LVEF of 65%.   Grossly normal RV size and systolic function.   Left atrial enlargement  Aortic valve is not well-visualized, likely normal function.   Mild to moderate MR   Trace TR.    No significant pericardial effusion.     MIROSLAVA CASTAÑEDA   This document has been electronically signed. Aug 16 2019  8:25AM     < end of copied text >    < from: MR Head No Cont (12.24.19 @ 13:08) >    EXAM:  MR BRAIN                          PROCEDURE DATE:  12/24/2019      INTERPRETATION:  CLINICAL STATEMENT: Right-sided numbness    TECHNIQUE: MRI of the brain was performed without gadolinium.    COMPARISON: CT head 12/23/2019    FINDINGS:  There is no abnormal T2 prolongation signal in the white matter.    There is no acute parenchymal hemorrhage, parenchymal mass, mass effect or midline shift. There is no extra-axial fluid collection.  There is no hydrocephalus.  There is no acute infarct. Partial empty sella    Mucosal thickening paranasal sinuses. Partial opacification left mastoid air cells    IMPRESSION:  No acute intracranial hemorrhage or acute infarct.    BERKLEY BARNETT M.D., ATTENDING RADIOLOGIST  This document has been electronically signed. Dec 24 2019  1:15PM     < end of copied text >    < from: Xray Chest 1 View AP/PA (12.23.19 @ 16:16) >    EXAM:  XR CHEST AP OR PA 1V                            PROCEDURE DATE:  12/23/2019      INTERPRETATION:  Clinical information: Right-sided facial droop    Portable study, 4:11 PM    Comparison study dated 10/25/2019    Cardiac monitor leads present    Borderline cardiomegaly. No sign of lobar consolidation vascular congestion or effusion. Hilar regions, mediastinal contours intact. No acute osseous abnormalities.    IMPRESSION: See above report    LETY LONDONO M.D.,ATTENDINGRADIOLOGIST  This document has been electronically signed. Dec 23 2019  4:20PM      < end of copied text >

## 2019-12-27 NOTE — PROGRESS NOTE ADULT - SUBJECTIVE AND OBJECTIVE BOX
Neurology Follow up note    SARAH MORRISON49yMale    HPI:  50 yo M with hx CVA in past, is on Eliquis (unclear what time last took), also h/o AF, HTN, hyperthyroid, DM, BPH - came in c/o sudden onset R sided facial droop and weakness to R side of body. Pt with hx same symptoms in August - cta / MRI were negative at that time. Pt now has recurrent symptoms -- onset ~ 30 min PTA. No cp/sob/palp. Pt states feels overall weak. No fall/ recent trauma. No aggravating/alleviating factors. No other injuries or other complaints. (23 Dec 2019 22:25)      Interval History - no new weakness    Patient is seen, chart was reviewed and case was discussed with the treatment team.  Pt is not in any distress.   Lying on bed comfortably.   No events reported overnight.   No clinical seizure was reported.  Sitting on chair bed comfortably.    is at bedside.    Vital Signs Last 24 Hrs  T(C): 37.1 (27 Dec 2019 11:48), Max: 37.1 (27 Dec 2019 11:48)  T(F): 98.7 (27 Dec 2019 11:48), Max: 98.7 (27 Dec 2019 11:48)  HR: 67 (27 Dec 2019 11:48) (51 - 67)  BP: 146/79 (27 Dec 2019 11:48) (117/74 - 150/64)  BP(mean): --  RR: 17 (27 Dec 2019 11:48) (17 - 18)  SpO2: 97% (27 Dec 2019 11:48) (95% - 97%)      REVIEW OF SYSTEMS:    Constitutional: No fever, weight loss or fatigue  Eyes: No eye pain, visual disturbances, or discharge  ENT:  No difficulty hearing, tinnitus, vertigo; No sinus or throat pain  Neck: No pain or stiffness  Respiratory: No cough, wheezing, chills or hemoptysis  Cardiovascular: No chest pain, palpitations, shortness of breath,   Gastrointestinal: No abdominal or epigastric pain.   Genitourinary: No dysuria, frequency, hematuria or incontinence  Neurological: No headaches, memory loss, loss of strength, numbness or tremors  Psychiatric: No depression, anxiety, mood swings or difficulty sleeping  Musculoskeletal: No joint pain or swelling; No muscle, back or extremity pain  Skin: No itching, burning, rashes or lesions   Lymph Nodes: No enlarged glands  Endocrine: No heat or cold intolerance;   Allergy and Immunologic: No hives or eczema    On Neurological Examination:    Mental Status - Pt is alert, awake, oriented X3. Follows commands well and able to answer questions appropriately      Speech -  mild dysarthria ?    Cranial Nerves - Pupils 3 mm equal and reactive to light, extraocular eye movements intact. Pt has no visual field deficit.  Pt has no  facial asymmetry. Facial sensation is intact.Tongue - is in midline.    Muscle tone - is normal all over,      Motor Exam - RUE , 4/5 (weakness today); RLE 2/5  LUE 5/5; LLE 4+/5    Sensory Exam Pt withdraws all extremities equally on stimulation.        coordination:    Finger to nose: normal    Deep tendon Reflexes - 2 plus all over.        Romberg - Negative.    Neck Supple -  Yes.     MEDICATIONS    acetaminophen   Tablet .. 650 milliGRAM(s) Oral every 6 hours PRN  apixaban 5 milliGRAM(s) Oral every 12 hours  atorvastatin 80 milliGRAM(s) Oral at bedtime  bethanechol 25 milliGRAM(s) Oral three times a day  cefuroxime   Tablet 250 milliGRAM(s) Oral every 12 hours  clopidogrel Tablet 75 milliGRAM(s) Oral daily  dextrose 40% Gel 15 Gram(s) Oral once PRN  dextrose 5%. 1000 milliLiter(s) IV Continuous <Continuous>  dextrose 50% Injectable 12.5 Gram(s) IV Push once  dextrose 50% Injectable 25 Gram(s) IV Push once  dextrose 50% Injectable 25 Gram(s) IV Push once  glucagon  Injectable 1 milliGRAM(s) IntraMuscular once PRN  insulin lispro (HumaLOG) corrective regimen sliding scale   SubCutaneous three times a day before meals  insulin lispro (HumaLOG) corrective regimen sliding scale   SubCutaneous at bedtime  isosorbide   mononitrate ER Tablet (IMDUR) 30 milliGRAM(s) Oral daily  losartan 25 milliGRAM(s) Oral daily  methimazole 10 milliGRAM(s) Oral daily  metoprolol tartrate 25 milliGRAM(s) Oral every 12 hours  pantoprazole    Tablet 40 milliGRAM(s) Oral before breakfast  predniSONE   Tablet 5 milliGRAM(s) Oral daily  sucralfate 1 Gram(s) Oral four times a day  tamsulosin 0.4 milliGRAM(s) Oral at bedtime      Allergies    fish (Hives)  No Known Drug Allergies    Intolerances              Hemoglobin A1C: Hemoglobin A1C, Whole Blood: 8.4 % (12-24 @ 07:53)    Lipid Panel 12-24 @ 07:56  Total Cholesterol, Serum 110  LDL 43  Triglycerides 83    Vitamin B12     RADIOLOGY  < from: MR Head No Cont (12.24.19 @ 13:08) >    EXAM:  MR BRAIN                            PROCEDURE DATE:  12/24/2019          INTERPRETATION:  CLINICAL STATEMENT: Right-sided numbness    TECHNIQUE: MRI of the brain was performed without gadolinium.    COMPARISON: CT head 12/23/2019    FINDINGS:  There is no abnormal T2 prolongation signal in the white matter.    There is no acute parenchymal hemorrhage, parenchymal mass, mass effect or midline shift. There is no extra-axial fluid collection.  There is no hydrocephalus.  There is no acute infarct. Partial empty sella    Mucosal thickening paranasal sinuses. Partial opacification left mastoid air cells    IMPRESSION:  No acute intracranial hemorrhage or acute infarct.      BERKLEY BARNETT M.D., ATTENDING RADIOLOGIST  This document has been electronically signed. Dec 24 2019  1:15PM              ASSESSMENT AND PLAN:      PRESENTED WITH RIGHT SIDED WEAKNESS/SLURRED SPEECH  PROBABLY REPRESENT MALINGERING  THAN FACTITIOUS DISORDER  WITH MRI OF THE HEAD SHOWING NO ACUTE CVA DESPITE SIGNIFICANT NEURO DEFICIT.  SIMILAR PRESENTATION TWICE BEFORE, RECEIVED TPA INTIALLY.  EEG WAS ALSO UNREMARKABLE  STABLE CEREBRAL ANEURYSM  AF    NO FURTHER NEURO BARGER.  CONTINUE AS  PSYCH FOLLOW UP  Physical therapy evaluation.  OOB to chair/ambulation with assistance only.  Pain is accessed and addressed.  Would continue to follow.

## 2019-12-28 PROCEDURE — 99232 SBSQ HOSP IP/OBS MODERATE 35: CPT

## 2019-12-28 RX ORDER — INSULIN LISPRO 100/ML
5 VIAL (ML) SUBCUTANEOUS
Refills: 0 | Status: DISCONTINUED | OUTPATIENT
Start: 2019-12-28 | End: 2019-12-29

## 2019-12-28 RX ADMIN — PANTOPRAZOLE SODIUM 40 MILLIGRAM(S): 20 TABLET, DELAYED RELEASE ORAL at 06:38

## 2019-12-28 RX ADMIN — Medication 5 UNIT(S): at 17:15

## 2019-12-28 RX ADMIN — Medication 5 UNIT(S): at 12:24

## 2019-12-28 RX ADMIN — CLOPIDOGREL BISULFATE 75 MILLIGRAM(S): 75 TABLET, FILM COATED ORAL at 12:25

## 2019-12-28 RX ADMIN — Medication 25 MILLIGRAM(S): at 15:11

## 2019-12-28 RX ADMIN — ATORVASTATIN CALCIUM 80 MILLIGRAM(S): 80 TABLET, FILM COATED ORAL at 21:42

## 2019-12-28 RX ADMIN — Medication 25 MILLIGRAM(S): at 21:40

## 2019-12-28 RX ADMIN — Medication 25 MILLIGRAM(S): at 06:41

## 2019-12-28 RX ADMIN — Medication 2: at 12:24

## 2019-12-28 RX ADMIN — INSULIN GLARGINE 15 UNIT(S): 100 INJECTION, SOLUTION SUBCUTANEOUS at 21:41

## 2019-12-28 RX ADMIN — Medication 1 GRAM(S): at 12:25

## 2019-12-28 RX ADMIN — Medication 250 MILLIGRAM(S): at 06:38

## 2019-12-28 RX ADMIN — Medication 2: at 08:08

## 2019-12-28 RX ADMIN — INSULIN GLARGINE 15 UNIT(S): 100 INJECTION, SOLUTION SUBCUTANEOUS at 08:09

## 2019-12-28 RX ADMIN — Medication 250 MILLIGRAM(S): at 17:14

## 2019-12-28 RX ADMIN — Medication 25 MILLIGRAM(S): at 17:14

## 2019-12-28 RX ADMIN — Medication 5 MILLIGRAM(S): at 06:38

## 2019-12-28 RX ADMIN — TAMSULOSIN HYDROCHLORIDE 0.4 MILLIGRAM(S): 0.4 CAPSULE ORAL at 21:40

## 2019-12-28 RX ADMIN — Medication 1 GRAM(S): at 00:28

## 2019-12-28 RX ADMIN — Medication 1 GRAM(S): at 06:38

## 2019-12-28 RX ADMIN — APIXABAN 5 MILLIGRAM(S): 2.5 TABLET, FILM COATED ORAL at 06:38

## 2019-12-28 RX ADMIN — APIXABAN 5 MILLIGRAM(S): 2.5 TABLET, FILM COATED ORAL at 17:14

## 2019-12-28 RX ADMIN — Medication 2: at 17:15

## 2019-12-28 RX ADMIN — Medication 1 GRAM(S): at 17:14

## 2019-12-28 RX ADMIN — ISOSORBIDE MONONITRATE 30 MILLIGRAM(S): 60 TABLET, EXTENDED RELEASE ORAL at 12:25

## 2019-12-28 NOTE — PROGRESS NOTE ADULT - SUBJECTIVE AND OBJECTIVE BOX
CAPILLARY BLOOD GLUCOSE      POCT Blood Glucose.: 151 mg/dL (28 Dec 2019 07:57)  POCT Blood Glucose.: 211 mg/dL (27 Dec 2019 21:59)  POCT Blood Glucose.: 249 mg/dL (27 Dec 2019 16:55)  POCT Blood Glucose.: 270 mg/dL (27 Dec 2019 12:03)      Vital Signs Last 24 Hrs  T(C): 36.8 (28 Dec 2019 08:11), Max: 37.1 (27 Dec 2019 11:48)  T(F): 98.2 (28 Dec 2019 08:11), Max: 98.7 (27 Dec 2019 11:48)  HR: 56 (28 Dec 2019 08:11) (52 - 67)  BP: 126/79 (28 Dec 2019 08:11) (96/59 - 146/79)  BP(mean): --  RR: 17 (28 Dec 2019 08:11) (17 - 18)  SpO2: 95% (28 Dec 2019 08:11) (93% - 98%)    General: WN/WD NAD  Respiratory: CTA B/L  CV: RRR, S1S2, no murmurs, rubs or gallops  Abdominal: Soft, NT, ND +BS, Last BM  Extremities: No edema, + peripheral pulses             atorvastatin 80 milliGRAM(s) Oral at bedtime  dextrose 40% Gel 15 Gram(s) Oral once PRN  dextrose 50% Injectable 12.5 Gram(s) IV Push once  dextrose 50% Injectable 25 Gram(s) IV Push once  dextrose 50% Injectable 25 Gram(s) IV Push once  glucagon  Injectable 1 milliGRAM(s) IntraMuscular once PRN  insulin glargine Injectable (LANTUS) 15 Unit(s) SubCutaneous two times a day  insulin lispro (HumaLOG) corrective regimen sliding scale   SubCutaneous three times a day before meals  insulin lispro (HumaLOG) corrective regimen sliding scale   SubCutaneous at bedtime  methimazole 5 milliGRAM(s) Oral daily  predniSONE   Tablet 5 milliGRAM(s) Oral daily

## 2019-12-28 NOTE — PROGRESS NOTE ADULT - SUBJECTIVE AND OBJECTIVE BOX
Patient is a 49y old  Male who presents with a chief complaint of R sided numbness (28 Dec 2019 10:31)      INTERVAL HPI/OVERNIGHT EVENTS: Patient seen and examined. NAD. No complaints.    Vital Signs Last 24 Hrs  T(C): 36.4 (28 Dec 2019 11:33), Max: 36.8 (27 Dec 2019 15:26)  T(F): 97.6 (28 Dec 2019 11:33), Max: 98.3 (27 Dec 2019 15:26)  HR: 55 (28 Dec 2019 11:33) (52 - 62)  BP: 134/88 (28 Dec 2019 11:33) (96/59 - 134/88)  BP(mean): --  RR: 18 (28 Dec 2019 11:33) (17 - 18)  SpO2: 97% (28 Dec 2019 11:33) (93% - 98%)              CAPILLARY BLOOD GLUCOSE      POCT Blood Glucose.: 186 mg/dL (28 Dec 2019 11:54)  POCT Blood Glucose.: 151 mg/dL (28 Dec 2019 07:57)  POCT Blood Glucose.: 211 mg/dL (27 Dec 2019 21:59)  POCT Blood Glucose.: 249 mg/dL (27 Dec 2019 16:55)              acetaminophen   Tablet .. 650 milliGRAM(s) Oral every 6 hours PRN  apixaban 5 milliGRAM(s) Oral every 12 hours  atorvastatin 80 milliGRAM(s) Oral at bedtime  bethanechol 25 milliGRAM(s) Oral three times a day  cefuroxime   Tablet 250 milliGRAM(s) Oral every 12 hours  clopidogrel Tablet 75 milliGRAM(s) Oral daily  dextrose 40% Gel 15 Gram(s) Oral once PRN  dextrose 5%. 1000 milliLiter(s) IV Continuous <Continuous>  dextrose 50% Injectable 12.5 Gram(s) IV Push once  dextrose 50% Injectable 25 Gram(s) IV Push once  dextrose 50% Injectable 25 Gram(s) IV Push once  glucagon  Injectable 1 milliGRAM(s) IntraMuscular once PRN  insulin glargine Injectable (LANTUS) 15 Unit(s) SubCutaneous two times a day  insulin lispro (HumaLOG) corrective regimen sliding scale   SubCutaneous three times a day before meals  insulin lispro (HumaLOG) corrective regimen sliding scale   SubCutaneous at bedtime  insulin lispro Injectable (HumaLOG) 5 Unit(s) SubCutaneous three times a day before meals  isosorbide   mononitrate ER Tablet (IMDUR) 30 milliGRAM(s) Oral daily  losartan 25 milliGRAM(s) Oral daily  methimazole 5 milliGRAM(s) Oral daily  metoprolol tartrate 25 milliGRAM(s) Oral every 12 hours  pantoprazole    Tablet 40 milliGRAM(s) Oral before breakfast  predniSONE   Tablet 5 milliGRAM(s) Oral daily  sucralfate 1 Gram(s) Oral four times a day  tamsulosin 0.4 milliGRAM(s) Oral at bedtime              REVIEW OF SYSTEMS:  CONSTITUTIONAL: No fever, no weight loss, or no fatigue  NECK: No pain, no stiffness  RESPIRATORY: No cough, no wheezing, no chills, no hemoptysis, No shortness of breath  CARDIOVASCULAR: No chest pain, no palpitations, no dizziness, no leg swelling  GASTROINTESTINAL: No abdominal pain. No nausea, no vomiting, no hematemesis; No diarrhea, no constipation. No melena, no hematochezia.  GENITOURINARY: No dysuria, no frequency, no hematuria, no incontinence  NEUROLOGICAL: No headaches, no loss of strength, no numbness, no tremors  SKIN: No itching, no burning  MUSCULOSKELETAL: No joint pain, no swelling; No muscle, no back, no extremity pain  PSYCHIATRIC: No depression, no mood swings,   HEME/LYMPH: No easy bruising, no bleeding gums  ALLERY AND IMMUNOLOGIC: No hives       Consultant(s) Notes Reviewed:  [X] YES  [ ] NO    PHYSICAL EXAM:  GENERAL: NAD  HEAD:  Atraumatic, Normocephalic  EYES: EOMI, PERRLA, conjunctiva and sclera clear  ENMT: No tonsillar erythema, exudates, or enlargement; Moist mucous membranes  NECK: Supple, No JVD  NERVOUS SYSTEM:  Awake & alert  CHEST/LUNG: Clear to auscultation bilaterally; No rales, rhonchi, wheezing,  HEART: Regular rate and rhythm  ABDOMEN: Soft, Nontender, Nondistended; Bowel sounds present  EXTREMITIES:  No clubbing, cyanosis, or edema  LYMPH: No lymphadenopathy noted  SKIN: No rashes      Advanced care planning discussed with patient/family [X] YES   [ ] NO    Advanced care planning discussed with patient/family. Advanced care planning forms reviewed/discussed/completed. 20 minutes spent.

## 2019-12-28 NOTE — PROGRESS NOTE ADULT - PROBLEM SELECTOR PLAN 1
cont lantus 15 units 2x/day  add humalog 5 units 3x/day before meals  cont mod dose humalog scale coverage qac/qhs  goal bg 100-180 in hosp setting  cont cons cho diet

## 2019-12-28 NOTE — PROGRESS NOTE ADULT - ASSESSMENT
48 yo male with PMHx significant of CAD s/p BMS to RCA (8/2019), RUL subsegmental PE (6/2019, on Eliquis), hx of NSTEMI and PAF s/p ex-lap omentopexy and plication for perforated antral ulcer (5/2019), osteomyelitis s/p debridement, CVA s/p tPA (8/2019), HTN and DM2 (not on insulin) presenting to the ED with right sided weakness, facial droop, and slurred speech.    RIGHT sided weakness, slurred speech   - Neurology evaluation appreciated   - MR head negative.   -EEg unremarkable       Afib  - Maintaining SR while on tele  - Amiodarone was previously discontinued as it may have been causing his thyroid issues.  - Continue with lopressor BID with hold parameters  - Thromboembolism ppx on Elqiuis BID   -  Echo 12/24 as delineated above revealing severe LVH, normal systolic function ef 65% grade 2 diastolic dysfunction, mild MR, trace TR   - Monitor electrolytes, replete to keep K>4 and Mag>2    CAD s/p PCI  -  no symptoms suggestive of ischemia  - Continue Plavix 75 QD  - Continue Statin  - Continue Imdur  - Continue Losartan, beta blocker     Awaiting dc to ELIAS Alvarez FNP-C  Cardiology NP  [id number] [phone]

## 2019-12-28 NOTE — PROGRESS NOTE ADULT - SUBJECTIVE AND OBJECTIVE BOX
[facility] -- Bolivar Carbajal, El, Brittany, Reynaldo Sweeney Savella  Office # [phone]      Follow Up:    CAD  Subjective/Observations:   No events overnight resting comfortably in bed.  No complaints of chest pain, dyspnea, or palpitations reported. No signs of orthopnea or PND.      REVIEW OF SYSTEMS: All other review of systems is negative unless indicated above    PAST MEDICAL & SURGICAL HISTORY:  Cerebrovascular accident  CAD S/P percutaneous coronary angioplasty  Osteomyelitis: s/p debridement  History of non-ST elevation myocardial infarction (NSTEMI)  Pulmonary embolism  Perforated gastric ulcer: s/p emergent ex-lap omentopexy and plication 6/2019  BPH (benign prostatic hyperplasia)  Hypertension  Afib  Diabetes mellitus with no complication  Diabetes  Traumatic amputation of left foot, initial encounter  Perforated gastric ulcer  H/O abdominal surgery      MEDICATIONS  (STANDING):  apixaban 5 milliGRAM(s) Oral every 12 hours  atorvastatin 80 milliGRAM(s) Oral at bedtime  bethanechol 25 milliGRAM(s) Oral three times a day  cefuroxime   Tablet 250 milliGRAM(s) Oral every 12 hours  clopidogrel Tablet 75 milliGRAM(s) Oral daily  dextrose 5%. 1000 milliLiter(s) (50 mL/Hr) IV Continuous <Continuous>  dextrose 50% Injectable 12.5 Gram(s) IV Push once  dextrose 50% Injectable 25 Gram(s) IV Push once  dextrose 50% Injectable 25 Gram(s) IV Push once  insulin glargine Injectable (LANTUS) 15 Unit(s) SubCutaneous two times a day  insulin lispro (HumaLOG) corrective regimen sliding scale   SubCutaneous three times a day before meals  insulin lispro (HumaLOG) corrective regimen sliding scale   SubCutaneous at bedtime  isosorbide   mononitrate ER Tablet (IMDUR) 30 milliGRAM(s) Oral daily  losartan 25 milliGRAM(s) Oral daily  methimazole 5 milliGRAM(s) Oral daily  metoprolol tartrate 25 milliGRAM(s) Oral every 12 hours  pantoprazole    Tablet 40 milliGRAM(s) Oral before breakfast  predniSONE   Tablet 5 milliGRAM(s) Oral daily  sucralfate 1 Gram(s) Oral four times a day  tamsulosin 0.4 milliGRAM(s) Oral at bedtime    MEDICATIONS  (PRN):  acetaminophen   Tablet .. 650 milliGRAM(s) Oral every 6 hours PRN Temp greater or equal to 38C (100.4F), Mild Pain (1 - 3)  dextrose 40% Gel 15 Gram(s) Oral once PRN Blood Glucose LESS THAN 70 milliGRAM(s)/deciLiter  glucagon  Injectable 1 milliGRAM(s) IntraMuscular once PRN Glucose <70 milliGRAM(s)/deciLiter      Allergies    fish (Hives)  No Known Drug Allergies    Intolerances        Vital Signs Last 24 Hrs  T(C): 36.5 (28 Dec 2019 04:32), Max: 37.1 (27 Dec 2019 11:48)  T(F): 97.7 (28 Dec 2019 04:32), Max: 98.7 (27 Dec 2019 11:48)  HR: 59 (28 Dec 2019 04:32) (52 - 67)  BP: 96/59 (28 Dec 2019 04:32) (96/59 - 146/79)  BP(mean): --  RR: 17 (28 Dec 2019 04:32) (17 - 18)  SpO2: 93% (28 Dec 2019 04:32) (93% - 98%)    I&O's Summary    27 Dec 2019 07:01  -  28 Dec 2019 07:00  --------------------------------------------------------  IN: 900 mL / OUT: 4500 mL / NET: -3600 mL          PHYSICAL EXAM:  TELE: not on tele   Constitutional: NAD, awake and alert, well-developed  HEENT: Moist Mucous Membranes, Anicteric  Pulmonary: Non-labored, breath sounds are clear bilaterally, No wheezing, crackles or rhonchi  Cardiovascular: Regular, S1 and S2 nl, No murmurs, rubs, gallops or clicks  Gastrointestinal: Bowel Sounds present, soft, nontender.   Lymph: No lymphadenopathy. No peripheral edema.  Skin: No visible rashes or ulcers.  Psych:  Mood & affect appropriate    LABS: All Labs Reviewed:                   ECG:  < from: 12 Lead ECG (12.23.19 @ 15:36) >    Ventricular Rate 86 BPM    Atrial Rate 86 BPM    P-R Interval 152 ms    QRS Duration 86 ms    Q-T Interval 370 ms    QTC Calculation(Bezet) 442 ms    P Axis 35 degrees    R Axis 6 degrees    T Axis -16 degrees    Diagnosis Line Normal sinus rhythm  Nonspecific ST abnormality    < end of copied text >    Echo:    < from: TTE Echo Doppler w/o Cont (12.24.19 @ 13:55) >    Severe concentric LVH, and mild LV enlargement, with normal systolic function, estimated LVEF of 65%. Normal right ventricular size and systolic function. Grade 2diastolic dysfunction. Left atrial enlargement The aortic root is normal in size. The mitral valve is structurally normal, mild MR. The aortic valve is trileaflet without stenosis or insufficiency. Trace physiologic TR. Inadequate TR jet to estimate PA systolic pressure No significant pericardial effusion.        < end of copied text >      Radiology:           [facility], [place]   Cardiology

## 2019-12-29 PROCEDURE — 99232 SBSQ HOSP IP/OBS MODERATE 35: CPT

## 2019-12-29 RX ORDER — INSULIN LISPRO 100/ML
7 VIAL (ML) SUBCUTANEOUS
Refills: 0 | Status: DISCONTINUED | OUTPATIENT
Start: 2019-12-29 | End: 2019-12-31

## 2019-12-29 RX ADMIN — APIXABAN 5 MILLIGRAM(S): 2.5 TABLET, FILM COATED ORAL at 16:50

## 2019-12-29 RX ADMIN — Medication 25 MILLIGRAM(S): at 06:37

## 2019-12-29 RX ADMIN — Medication 250 MILLIGRAM(S): at 06:37

## 2019-12-29 RX ADMIN — ATORVASTATIN CALCIUM 80 MILLIGRAM(S): 80 TABLET, FILM COATED ORAL at 22:39

## 2019-12-29 RX ADMIN — Medication 2: at 07:33

## 2019-12-29 RX ADMIN — Medication 25 MILLIGRAM(S): at 22:39

## 2019-12-29 RX ADMIN — Medication 5 MILLIGRAM(S): at 06:37

## 2019-12-29 RX ADMIN — LOSARTAN POTASSIUM 25 MILLIGRAM(S): 100 TABLET, FILM COATED ORAL at 06:37

## 2019-12-29 RX ADMIN — Medication 1 GRAM(S): at 00:12

## 2019-12-29 RX ADMIN — Medication 25 MILLIGRAM(S): at 11:03

## 2019-12-29 RX ADMIN — TAMSULOSIN HYDROCHLORIDE 0.4 MILLIGRAM(S): 0.4 CAPSULE ORAL at 22:39

## 2019-12-29 RX ADMIN — Medication 1 GRAM(S): at 23:16

## 2019-12-29 RX ADMIN — Medication 4: at 16:50

## 2019-12-29 RX ADMIN — Medication 0: at 22:39

## 2019-12-29 RX ADMIN — PANTOPRAZOLE SODIUM 40 MILLIGRAM(S): 20 TABLET, DELAYED RELEASE ORAL at 06:37

## 2019-12-29 RX ADMIN — Medication 1 GRAM(S): at 16:49

## 2019-12-29 RX ADMIN — Medication 7 UNIT(S): at 16:50

## 2019-12-29 RX ADMIN — ISOSORBIDE MONONITRATE 30 MILLIGRAM(S): 60 TABLET, EXTENDED RELEASE ORAL at 11:03

## 2019-12-29 RX ADMIN — Medication 7 UNIT(S): at 11:56

## 2019-12-29 RX ADMIN — CLOPIDOGREL BISULFATE 75 MILLIGRAM(S): 75 TABLET, FILM COATED ORAL at 11:03

## 2019-12-29 RX ADMIN — Medication 1 GRAM(S): at 06:38

## 2019-12-29 RX ADMIN — Medication 1 GRAM(S): at 11:03

## 2019-12-29 RX ADMIN — Medication 6: at 11:55

## 2019-12-29 RX ADMIN — INSULIN GLARGINE 15 UNIT(S): 100 INJECTION, SOLUTION SUBCUTANEOUS at 07:34

## 2019-12-29 RX ADMIN — INSULIN GLARGINE 15 UNIT(S): 100 INJECTION, SOLUTION SUBCUTANEOUS at 22:39

## 2019-12-29 RX ADMIN — Medication 5 UNIT(S): at 07:33

## 2019-12-29 RX ADMIN — APIXABAN 5 MILLIGRAM(S): 2.5 TABLET, FILM COATED ORAL at 06:37

## 2019-12-29 NOTE — PROGRESS NOTE ADULT - PROBLEM SELECTOR PLAN 1
cont lantus 15 units 2x/day  increase humalog 7 units 3x/day before meals  cont mod dose humalog scale coverage qac/qhs  cont cons cho diet  goal bg 100-180 in hosp setting

## 2019-12-29 NOTE — PROGRESS NOTE ADULT - SUBJECTIVE AND OBJECTIVE BOX
CAPILLARY BLOOD GLUCOSE      POCT Blood Glucose.: 168 mg/dL (29 Dec 2019 07:21)  POCT Blood Glucose.: 206 mg/dL (28 Dec 2019 21:18)  POCT Blood Glucose.: 173 mg/dL (28 Dec 2019 16:55)  POCT Blood Glucose.: 186 mg/dL (28 Dec 2019 11:54)      Vital Signs Last 24 Hrs  T(C): 36.5 (29 Dec 2019 09:01), Max: 36.9 (28 Dec 2019 16:27)  T(F): 97.7 (29 Dec 2019 09:01), Max: 98.4 (28 Dec 2019 16:27)  HR: 52 (29 Dec 2019 09:01) (52 - 61)  BP: 128/73 (29 Dec 2019 09:01) (108/65 - 141/94)  BP(mean): --  RR: 17 (29 Dec 2019 09:01) (17 - 18)  SpO2: 92% (29 Dec 2019 09:01) (92% - 98%)    General: WN/WD NAD  Respiratory: CTA B/L  CV: RRR, S1S2, no murmurs, rubs or gallops  Abdominal: Soft, NT, ND +BS, Last BM  Extremities: No edema, + peripheral pulses             atorvastatin 80 milliGRAM(s) Oral at bedtime  dextrose 40% Gel 15 Gram(s) Oral once PRN  dextrose 50% Injectable 12.5 Gram(s) IV Push once  dextrose 50% Injectable 25 Gram(s) IV Push once  dextrose 50% Injectable 25 Gram(s) IV Push once  glucagon  Injectable 1 milliGRAM(s) IntraMuscular once PRN  insulin glargine Injectable (LANTUS) 15 Unit(s) SubCutaneous two times a day  insulin lispro (HumaLOG) corrective regimen sliding scale   SubCutaneous three times a day before meals  insulin lispro (HumaLOG) corrective regimen sliding scale   SubCutaneous at bedtime  insulin lispro Injectable (HumaLOG) 5 Unit(s) SubCutaneous three times a day before meals  methimazole 5 milliGRAM(s) Oral daily  predniSONE   Tablet 5 milliGRAM(s) Oral daily

## 2019-12-29 NOTE — PROGRESS NOTE ADULT - ASSESSMENT
50 yo male with PMHx significant of CAD s/p BMS to RCA (8/2019), RUL subsegmental PE (6/2019, on Eliquis), hx of NSTEMI and PAF s/p ex-lap omentopexy and plication for perforated antral ulcer (5/2019), osteomyelitis s/p debridement, CVA s/p tPA (8/2019), HTN and DM2 (not on insulin) presenting to the ED with right sided weakness, facial droop, and slurred speech.    Hx CVA  - He presented with neuro symptoms, though, Neuro not convinced.  Seen by Psyche as well.  Recs noted  - MR head negative.   - EEG unremarkable   - Continue Plavix and high-intensity statin  - Follow Neuro recs    Afib  - Maintained SR while on tele  - Amiodarone was previously discontinued as it may have been causing his thyroid issues.  - Continue with Lopressor BID with hold parameters  - Thromboembolism ppx on Elqiuis BID   -  Echo 12/24 as delineated above revealing severe LVH, normal systolic function ef 65% grade 2 diastolic dysfunction, mild MR, trace TR   - Monitor electrolytes, replete to keep K>4 and Mag>2    CAD s/p PCI  - No symptoms suggestive of ischemia  - Continue Plavix 75 QD  - Continue Statin  - Continue Imdur  - Continue ARB and beta blocker     Awaiting dc to [facility].  Remains stable from cardiac standpoint    Henrietta Kim DNP, NP-C  Cardiology   [facility] #[phone]

## 2019-12-29 NOTE — PROGRESS NOTE ADULT - SUBJECTIVE AND OBJECTIVE BOX
[facility] -- Bolivar Carbajal Grossman, [place], Reynaldo Sweeney Savella, Goodger  Office # [phone]    Follow Up: Neuro Symptoms    Subjective/Observations: Awake and alert, laying supine on RA.  Denies any respiratory or cardiac discomfort  Claimed to have resolved weakness of RUE but with persistent RLE weakness    REVIEW OF SYSTEMS: All other review of systems is negative unless indicated above  PAST MEDICAL & SURGICAL HISTORY:  Cerebrovascular accident  CAD S/P percutaneous coronary angioplasty  Osteomyelitis: s/p debridement  History of non-ST elevation myocardial infarction (NSTEMI)  Pulmonary embolism  Perforated gastric ulcer: s/p emergent ex-lap omentopexy and plication 6/2019  BPH (benign prostatic hyperplasia)  Hypertension  Afib  Diabetes mellitus with no complication  Diabetes  Traumatic amputation of left foot, initial encounter  Perforated gastric ulcer  H/O abdominal surgery    MEDICATIONS  (STANDING):  apixaban 5 milliGRAM(s) Oral every 12 hours  atorvastatin 80 milliGRAM(s) Oral at bedtime  bethanechol 25 milliGRAM(s) Oral three times a day  clopidogrel Tablet 75 milliGRAM(s) Oral daily  dextrose 5%. 1000 milliLiter(s) (50 mL/Hr) IV Continuous <Continuous>  dextrose 50% Injectable 12.5 Gram(s) IV Push once  dextrose 50% Injectable 25 Gram(s) IV Push once  dextrose 50% Injectable 25 Gram(s) IV Push once  insulin glargine Injectable (LANTUS) 15 Unit(s) SubCutaneous two times a day  insulin lispro (HumaLOG) corrective regimen sliding scale   SubCutaneous three times a day before meals  insulin lispro (HumaLOG) corrective regimen sliding scale   SubCutaneous at bedtime  insulin lispro Injectable (HumaLOG) 5 Unit(s) SubCutaneous three times a day before meals  isosorbide   mononitrate ER Tablet (IMDUR) 30 milliGRAM(s) Oral daily  losartan 25 milliGRAM(s) Oral daily  methimazole 5 milliGRAM(s) Oral daily  metoprolol tartrate 25 milliGRAM(s) Oral every 12 hours  pantoprazole    Tablet 40 milliGRAM(s) Oral before breakfast  predniSONE   Tablet 5 milliGRAM(s) Oral daily  sucralfate 1 Gram(s) Oral four times a day  tamsulosin 0.4 milliGRAM(s) Oral at bedtime    MEDICATIONS  (PRN):  acetaminophen   Tablet .. 650 milliGRAM(s) Oral every 6 hours PRN Temp greater or equal to 38C (100.4F), Mild Pain (1 - 3)  dextrose 40% Gel 15 Gram(s) Oral once PRN Blood Glucose LESS THAN 70 milliGRAM(s)/deciLiter  glucagon  Injectable 1 milliGRAM(s) IntraMuscular once PRN Glucose <70 milliGRAM(s)/deciLiter    Allergies    fish (Hives)  No Known Drug Allergies    Intolerances    Vital Signs Last 24 Hrs  T(C): 36.6 (29 Dec 2019 04:27), Max: 36.9 (28 Dec 2019 16:27)  T(F): 97.9 (29 Dec 2019 04:27), Max: 98.4 (28 Dec 2019 16:27)  HR: 53 (29 Dec 2019 04:27) (53 - 61)  BP: 120/71 (29 Dec 2019 04:27) (108/65 - 141/94)  BP(mean): --  RR: 17 (29 Dec 2019 04:27) (17 - 18)  SpO2: 96% (29 Dec 2019 04:27) (96% - 98%)  I&O's Summary    28 Dec 2019 07:01  -  29 Dec 2019 07:00  --------------------------------------------------------  IN: 0 mL / OUT: 2750 mL / NET: -2750 mL    PHYSICAL EXAM:  TELE: Not on tele  Constitutional: NAD, awake and alert, obese  HEENT: Moist Mucous Membranes, Anicteric  Pulmonary: Non-labored, breath sounds are clear bilaterally, No wheezing, rales or rhonchi  Cardiovascular: Regular, S1 and S2, No murmurs, rubs, gallops or clicks  Gastrointestinal: Bowel Sounds present, soft, nontender.   Lymph: No peripheral edema. No lymphadenopathy.  Skin: No visible rashes or ulcers.  Psych:  Mood & affect appropriate  LABS: All Labs Reviewed:    < from: TTE Echo Doppler w/o Cont (12.24.19 @ 13:55) >     EXAM:  ECHO TTE WO CON COMP W DOPPLR         PROCEDURE DATE:  12/24/2019        INTERPRETATION:  INDICATION: Coronary artery disease    Blood Pressure 117/71    Height 187     Weight 93       BSA 2.2    Dimensions:    LA 4.2       Normal Values: 2.0 - 4.0 cm    Ao 4.0        Normal Values: 2.0 - 3.8 cm  SEPTUM 1.6       Normal Values: 0.6 - 1.2 cm  PWT 1.6       Normal Values: 0.6 - 1.1 cm  LVIDd 5.8         Normal Values: 3.0 - 5.6 cm  LVIDs 3.2         Normal Values: 1.8 - 4.0 cm    Derived Variables:  LVMI g/m2  RWT  Fractional Short  Ejection Fraction    Doppler Peak v. AoV= (m/sec)    OBSERVATIONS:    Mitral Valve: normal, mild MR.  Aortic Valve/Aorta: normal trileaflet aortic valve.  Tricuspid Valve: normal with trace TR.  Pulmonic Valve: normal  Left Atrium: Enlarged  Right Atrium: normal  Left Ventricle: Severe concentric LVH with normal systolic function, estimated LVEF of 65%.  Right Ventricle: normal size and systolic function.  Pericardium/Pleura: no significant pleural effusion, no significant pericardial effusion.  Pulmonary/RV Pressure: Inadequate TR jet to estimate PA systolic pressure  LV Diastolic Function: Grade 2diastolic dysfunction.    Severe concentric LVH, and mild LV enlargement, with normal systolic function, estimated LVEF of 65%. Normal right ventricular size and systolic function. Grade 2diastolic dysfunction. Left atrial enlargement The aortic root is normal in size. The mitral valve is structurally normal, mild MR. The aortic valve is trileaflet without stenosis or insufficiency. Trace physiologic TR. Inadequate TR jet to estimate PA systolic pressure No significant pericardial effusion.      JOVANNI ALVAREZ M.D., ATTENDING CARDIOLOGIST  This document has been electronically signed. Dec 25 2019 11:45AM     < end of copied text >      < from: Xray Chest 1 View AP/PA (12.23.19 @ 16:16) >    EXAM:  XR CHEST AP OR PA 1V                          PROCEDURE DATE:  12/23/2019      INTERPRETATION:  Clinical information: Right-sided facial droop    Portable study, 4:11 PM    Comparison study dated 10/25/2019    Cardiac monitor leads present    Borderline cardiomegaly. No sign of lobar consolidation vascular congestion or effusion. Hilar regions, mediastinal contours intact. No acute osseous abnormalities.    IMPRESSION: See above report    LETY LONDONO M.D.,ATTENDINGRADIOLOGIST  This document has been electronically signed. Dec 23 2019  4:20PM     < end of copied text >     < from: 12 Lead ECG (12.23.19 @ 15:36) >    Ventricular Rate 86 BPM    Atrial Rate 86 BPM    P-R Interval 152 ms    QRS Duration 86 ms    Q-T Interval 370 ms    QTC Calculation(Bezet) 442 ms    P Axis 35 degrees    R Axis 6 degrees    T Axis -16 degrees    Diagnosis Line Normal sinus rhythm  Nonspecific ST abnormality  Confirmed by darien France ([id number]) on 12/24/2019 2:33:00 PM    < end of copied text >

## 2019-12-29 NOTE — PROGRESS NOTE ADULT - SUBJECTIVE AND OBJECTIVE BOX
Patient is a 49y old  Male who presents with a chief complaint of R sided numbness (29 Dec 2019 10:19)      INTERVAL HPI/OVERNIGHT EVENTS: Patient seen and examined. NAD. No complaints.    Vital Signs Last 24 Hrs  T(C): 36.8 (29 Dec 2019 11:51), Max: 36.9 (28 Dec 2019 16:27)  T(F): 98.3 (29 Dec 2019 11:51), Max: 98.4 (28 Dec 2019 16:27)  HR: 66 (29 Dec 2019 11:51) (52 - 66)  BP: 125/77 (29 Dec 2019 11:51) (108/65 - 141/94)  BP(mean): --  RR: 18 (29 Dec 2019 11:51) (17 - 18)  SpO2: 97% (29 Dec 2019 11:51) (92% - 98%)              CAPILLARY BLOOD GLUCOSE      POCT Blood Glucose.: 273 mg/dL (29 Dec 2019 11:50)  POCT Blood Glucose.: 168 mg/dL (29 Dec 2019 07:21)  POCT Blood Glucose.: 206 mg/dL (28 Dec 2019 21:18)  POCT Blood Glucose.: 173 mg/dL (28 Dec 2019 16:55)              acetaminophen   Tablet .. 650 milliGRAM(s) Oral every 6 hours PRN  apixaban 5 milliGRAM(s) Oral every 12 hours  atorvastatin 80 milliGRAM(s) Oral at bedtime  bethanechol 25 milliGRAM(s) Oral three times a day  clopidogrel Tablet 75 milliGRAM(s) Oral daily  dextrose 40% Gel 15 Gram(s) Oral once PRN  dextrose 5%. 1000 milliLiter(s) IV Continuous <Continuous>  dextrose 50% Injectable 12.5 Gram(s) IV Push once  dextrose 50% Injectable 25 Gram(s) IV Push once  dextrose 50% Injectable 25 Gram(s) IV Push once  glucagon  Injectable 1 milliGRAM(s) IntraMuscular once PRN  insulin glargine Injectable (LANTUS) 15 Unit(s) SubCutaneous two times a day  insulin lispro (HumaLOG) corrective regimen sliding scale   SubCutaneous three times a day before meals  insulin lispro (HumaLOG) corrective regimen sliding scale   SubCutaneous at bedtime  insulin lispro Injectable (HumaLOG) 7 Unit(s) SubCutaneous three times a day before meals  isosorbide   mononitrate ER Tablet (IMDUR) 30 milliGRAM(s) Oral daily  losartan 25 milliGRAM(s) Oral daily  methimazole 5 milliGRAM(s) Oral daily  metoprolol tartrate 25 milliGRAM(s) Oral every 12 hours  pantoprazole    Tablet 40 milliGRAM(s) Oral before breakfast  predniSONE   Tablet 5 milliGRAM(s) Oral daily  sucralfate 1 Gram(s) Oral four times a day  tamsulosin 0.4 milliGRAM(s) Oral at bedtime              REVIEW OF SYSTEMS:  CONSTITUTIONAL: No fever, no weight loss, or no fatigue  NECK: No pain, no stiffness  RESPIRATORY: No cough, no wheezing, no chills, no hemoptysis, No shortness of breath  CARDIOVASCULAR: No chest pain, no palpitations, no dizziness, no leg swelling  GASTROINTESTINAL: No abdominal pain. No nausea, no vomiting, no hematemesis; No diarrhea, no constipation. No melena, no hematochezia.  GENITOURINARY: No dysuria, no frequency, no hematuria, no incontinence  NEUROLOGICAL: No headaches, no loss of strength, no numbness, no tremors  SKIN: No itching, no burning  MUSCULOSKELETAL: No joint pain, no swelling; No muscle, no back, no extremity pain  PSYCHIATRIC: No depression, no mood swings,   HEME/LYMPH: No easy bruising, no bleeding gums  ALLERY AND IMMUNOLOGIC: No hives       Consultant(s) Notes Reviewed:  [X] YES  [ ] NO    PHYSICAL EXAM:  GENERAL: NAD  HEAD:  Atraumatic, Normocephalic  EYES: EOMI, PERRLA, conjunctiva and sclera clear  ENMT: No tonsillar erythema, exudates, or enlargement; Moist mucous membranes  NECK: Supple, No JVD  NERVOUS SYSTEM:  Awake & alert  CHEST/LUNG: Clear to auscultation bilaterally; No rales, rhonchi, wheezing,  HEART: Regular rate and rhythm  ABDOMEN: Soft, Nontender, Nondistended; Bowel sounds present  EXTREMITIES:  No clubbing, cyanosis, or edema  LYMPH: No lymphadenopathy noted  SKIN: No rashes      Advanced care planning discussed with patient/family [X] YES   [ ] NO    Advanced care planning discussed with patient/family. Advanced care planning forms reviewed/discussed/completed. 20 minutes spent.

## 2019-12-30 PROCEDURE — 99232 SBSQ HOSP IP/OBS MODERATE 35: CPT

## 2019-12-30 RX ADMIN — TAMSULOSIN HYDROCHLORIDE 0.4 MILLIGRAM(S): 0.4 CAPSULE ORAL at 23:47

## 2019-12-30 RX ADMIN — Medication 5 MILLIGRAM(S): at 05:20

## 2019-12-30 RX ADMIN — Medication 0: at 23:57

## 2019-12-30 RX ADMIN — Medication 1 GRAM(S): at 11:49

## 2019-12-30 RX ADMIN — Medication 25 MILLIGRAM(S): at 15:14

## 2019-12-30 RX ADMIN — ISOSORBIDE MONONITRATE 30 MILLIGRAM(S): 60 TABLET, EXTENDED RELEASE ORAL at 11:49

## 2019-12-30 RX ADMIN — Medication 4: at 11:50

## 2019-12-30 RX ADMIN — ATORVASTATIN CALCIUM 80 MILLIGRAM(S): 80 TABLET, FILM COATED ORAL at 23:47

## 2019-12-30 RX ADMIN — Medication 25 MILLIGRAM(S): at 05:20

## 2019-12-30 RX ADMIN — Medication 4: at 16:46

## 2019-12-30 RX ADMIN — Medication 1 GRAM(S): at 05:20

## 2019-12-30 RX ADMIN — INSULIN GLARGINE 15 UNIT(S): 100 INJECTION, SOLUTION SUBCUTANEOUS at 23:58

## 2019-12-30 RX ADMIN — LOSARTAN POTASSIUM 25 MILLIGRAM(S): 100 TABLET, FILM COATED ORAL at 05:20

## 2019-12-30 RX ADMIN — Medication 7 UNIT(S): at 08:01

## 2019-12-30 RX ADMIN — Medication 2: at 07:54

## 2019-12-30 RX ADMIN — Medication 25 MILLIGRAM(S): at 17:45

## 2019-12-30 RX ADMIN — Medication 25 MILLIGRAM(S): at 23:47

## 2019-12-30 RX ADMIN — PANTOPRAZOLE SODIUM 40 MILLIGRAM(S): 20 TABLET, DELAYED RELEASE ORAL at 05:20

## 2019-12-30 RX ADMIN — Medication 7 UNIT(S): at 11:51

## 2019-12-30 RX ADMIN — CLOPIDOGREL BISULFATE 75 MILLIGRAM(S): 75 TABLET, FILM COATED ORAL at 11:49

## 2019-12-30 RX ADMIN — APIXABAN 5 MILLIGRAM(S): 2.5 TABLET, FILM COATED ORAL at 05:20

## 2019-12-30 RX ADMIN — Medication 7 UNIT(S): at 16:47

## 2019-12-30 RX ADMIN — APIXABAN 5 MILLIGRAM(S): 2.5 TABLET, FILM COATED ORAL at 17:45

## 2019-12-30 RX ADMIN — Medication 1 GRAM(S): at 17:45

## 2019-12-30 RX ADMIN — INSULIN GLARGINE 15 UNIT(S): 100 INJECTION, SOLUTION SUBCUTANEOUS at 07:59

## 2019-12-30 RX ADMIN — Medication 1 GRAM(S): at 23:47

## 2019-12-30 NOTE — PROGRESS NOTE ADULT - ASSESSMENT
48 yo male with PMHx significant of CAD s/p BMS to RCA (8/2019), RUL subsegmental PE (6/2019, on Eliquis), hx of NSTEMI and PAF s/p ex-lap omentopexy and plication for perforated antral ulcer (5/2019), osteomyelitis s/p debridement, CVA s/p tPA (8/2019), HTN and DM2 (not on insulin) presenting to the ED with right sided weakness, facial droop, and slurred speech.    Hx CVA  - He presented with neuro symptoms, though, Neuro not convinced.  Seen by Psyche as well.  Recs noted  - MR head negative.   - EEG unremarkable   - Continue Plavix and high-intensity statin  - Neuro following    Afib  - Maintained SR while on tele.  Now off  - Amiodarone was previously discontinued as it may have been causing his thyroid issues.  - Continue with Lopressor BID with hold parameters  - Continue Elquis BID.  Monitor for s/s bleeding  - Echo 12/24 as delineated above revealing severe LVH, normal systolic function ef 65% grade 2 diastolic dysfunction, mild MR, trace TR   - Monitor electrolytes, replete to keep K>4 and Mag>2    CAD s/p PCI  - No symptoms suggestive of ischemia  - Continue Plavix 75 QD  - Continue Statin  - Continue Imdur  - Continue ARB and beta blocker     Awaiting D/C to [facility].  Remains stable from cardiac standpoint    Henrietta Steinstlynda URBANO, NP-C  Cardiology   Spectra #[phone]    Henrietta Steinstad DNP, NP-C  Cardiology   Spectra #[phone] 50 yo male with PMHx significant of CAD s/p BMS to RCA (8/2019), RUL subsegmental PE (6/2019, on Eliquis), hx of NSTEMI and PAF s/p ex-lap omentopexy and plication for perforated antral ulcer (5/2019), osteomyelitis s/p debridement, CVA s/p tPA (8/2019), HTN and DM2 (not on insulin) presenting to the ED with right sided weakness, facial droop, and slurred speech.    Hx CVA  - He presented with neuro symptoms, though, Neuro not convinced.  Seen by Psyche as well.  Recs noted  - MR head negative.   - EEG unremarkable   - Continue Plavix and high-intensity statin  - Neuro following    Afib  - Maintained SR while on tele.  Now off  - Amiodarone was previously discontinued as it may have been causing his thyroid issues.  - Continue with Lopressor BID with hold parameters  - Continue Elquis BID.  Monitor for s/s bleeding  - Echo 12/24 as delineated above revealing severe LVH, normal systolic function ef 65% grade 2 diastolic dysfunction, mild MR, trace TR   - Monitor electrolytes, replete to keep K>4 and Mag>2    CAD s/p PCI  - No symptoms suggestive of ischemia  - Continue Plavix 75 QD  - Continue Statin  - Continue Imdur  - Continue ARB and beta blocker     Awaiting D/C to [facility].  Remains stable from cardiac standpoint    Henrietta Kim DNP, NP-C  Cardiology   Spectra #[phone]

## 2019-12-30 NOTE — PROGRESS NOTE ADULT - SUBJECTIVE AND OBJECTIVE BOX
[facility] -- Bolivar Carbajal Grossman, Wachsman, Reynaldo Sweeney Savella, Goodger  Office # [phone]    Follow Up:  Neuro Symptoms    Subjective/Observations: sitting on the chair, remains comfortable  REVIEW OF SYSTEMS: All other review of systems is negative unless indicated above  PAST MEDICAL & SURGICAL HISTORY:  Cerebrovascular accident  CAD S/P percutaneous coronary angioplasty  Osteomyelitis: s/p debridement  History of non-ST elevation myocardial infarction (NSTEMI)  Pulmonary embolism  Perforated gastric ulcer: s/p emergent ex-lap omentopexy and plication 6/2019  BPH (benign prostatic hyperplasia)  Hypertension  Afib  Diabetes mellitus with no complication  Diabetes  Traumatic amputation of left foot, initial encounter  Perforated gastric ulcer  H/O abdominal surgery    MEDICATIONS  (STANDING):  apixaban 5 milliGRAM(s) Oral every 12 hours  atorvastatin 80 milliGRAM(s) Oral at bedtime  bethanechol 25 milliGRAM(s) Oral three times a day  clopidogrel Tablet 75 milliGRAM(s) Oral daily  dextrose 5%. 1000 milliLiter(s) (50 mL/Hr) IV Continuous <Continuous>  dextrose 50% Injectable 12.5 Gram(s) IV Push once  dextrose 50% Injectable 25 Gram(s) IV Push once  dextrose 50% Injectable 25 Gram(s) IV Push once  insulin glargine Injectable (LANTUS) 15 Unit(s) SubCutaneous two times a day  insulin lispro (HumaLOG) corrective regimen sliding scale   SubCutaneous three times a day before meals  insulin lispro (HumaLOG) corrective regimen sliding scale   SubCutaneous at bedtime  insulin lispro Injectable (HumaLOG) 7 Unit(s) SubCutaneous three times a day before meals  isosorbide   mononitrate ER Tablet (IMDUR) 30 milliGRAM(s) Oral daily  losartan 25 milliGRAM(s) Oral daily  methimazole 5 milliGRAM(s) Oral daily  metoprolol tartrate 25 milliGRAM(s) Oral every 12 hours  pantoprazole    Tablet 40 milliGRAM(s) Oral before breakfast  predniSONE   Tablet 5 milliGRAM(s) Oral daily  sucralfate 1 Gram(s) Oral four times a day  tamsulosin 0.4 milliGRAM(s) Oral at bedtime    MEDICATIONS  (PRN):  acetaminophen   Tablet .. 650 milliGRAM(s) Oral every 6 hours PRN Temp greater or equal to 38C (100.4F), Mild Pain (1 - 3)  dextrose 40% Gel 15 Gram(s) Oral once PRN Blood Glucose LESS THAN 70 milliGRAM(s)/deciLiter  glucagon  Injectable 1 milliGRAM(s) IntraMuscular once PRN Glucose <70 milliGRAM(s)/deciLiter    Allergies    fish (Hives)  No Known Drug Allergies    Intolerances    Vital Signs Last 24 Hrs  T(C): 36.8 (30 Dec 2019 08:20), Max: 36.8 (30 Dec 2019 08:20)  T(F): 98.2 (30 Dec 2019 08:20), Max: 98.2 (30 Dec 2019 08:20)  HR: 62 (30 Dec 2019 08:20) (58 - 64)  BP: 120/80 (30 Dec 2019 11:48) (110/70 - 123/75)  BP(mean): --  RR: 18 (30 Dec 2019 08:20) (16 - 18)  SpO2: 96% (30 Dec 2019 08:20) (96% - 100%)  I&O's Summary    29 Dec 2019 07:01  -  30 Dec 2019 07:00  --------------------------------------------------------  IN: 0 mL / OUT: 3300 mL / NET: -3300 mL    30 Dec 2019 07:01  -  30 Dec 2019 11:51  --------------------------------------------------------  IN: 240 mL / OUT: 0 mL / NET: 240 mL    PHYSICAL EXAM:  TELE: Not on tele  Constitutional: NAD, awake and alert, obese  HEENT: Moist Mucous Membranes, Anicteric  Pulmonary: Non-labored, breath sounds are clear bilaterally, No wheezing, rales or rhonchi  Cardiovascular: Regular, S1 and S2, No murmurs, rubs, gallops or clicks  Gastrointestinal: Bowel Sounds present, soft, nontender.   Lymph: No peripheral edema. No lymphadenopathy.  Skin: No visible rashes or ulcers.  Psych:  Mood & affect appropriate    LABS: All Labs Reviewed:    < from: TTE Echo Doppler w/o Cont (12.24.19 @ 13:55) >     EXAM:  ECHO TTE WO CON COMP W DOPPLR       PROCEDURE DATE:  12/24/2019      INTERPRETATION:  INDICATION: Coronary artery disease    Blood Pressure 117/71    Height 187     Weight 93       BSA 2.2    Dimensions:    LA 4.2       Normal Values: 2.0 - 4.0 cm    Ao 4.0        Normal Values: 2.0 - 3.8 cm  SEPTUM 1.6       Normal Values: 0.6 - 1.2 cm  PWT 1.6       Normal Values: 0.6 - 1.1 cm  LVIDd 5.8         Normal Values: 3.0 - 5.6 cm  LVIDs 3.2         Normal Values: 1.8 - 4.0 cm    Derived Variables:  LVMI g/m2  RWT  Fractional Short  Ejection Fraction    Doppler Peak v. AoV= (m/sec)    OBSERVATIONS:    Mitral Valve: normal, mild MR.  Aortic Valve/Aorta: normal trileaflet aortic valve.  Tricuspid Valve: normal with trace TR.  Pulmonic Valve: normal  Left Atrium: Enlarged  Right Atrium: normal  Left Ventricle: Severe concentric LVH with normal systolic function, estimated LVEF of 65%.  Right Ventricle: normal size and systolic function.  Pericardium/Pleura: no significant pleural effusion, no significant pericardial effusion.  Pulmonary/RV Pressure: Inadequate TR jet to estimate PA systolic pressure  LV Diastolic Function: Grade 2diastolic dysfunction.    Severe concentric LVH, and mild LV enlargement, with normal systolic function, estimated LVEF of 65%. Normal right ventricular size and systolic function. Grade 2diastolic dysfunction. Left atrial enlargement The aortic root is normal in size. The mitral valve is structurally normal, mild MR. The aortic valve is trileaflet without stenosis or insufficiency. Trace physiologic TR. Inadequate TR jet to estimate PA systolic pressure No significant pericardial effusion.      JOVANNI ALVAREZ M.D., ATTENDING CARDIOLOGIST  This document has been electronically signed. Dec 25 2019 11:45AM     < end of copied text >    < from: Xray Chest 1 View AP/PA (12.23.19 @ 16:16) >    EXAM:  XR CHEST AP OR PA 1V                          PROCEDURE DATE:  12/23/2019      INTERPRETATION:  Clinical information: Right-sided facial droop    Portable study, 4:11 PM    Comparison study dated 10/25/2019    Cardiac monitor leads present    Borderline cardiomegaly. No sign of lobar consolidation vascular congestion or effusion. Hilar regions, mediastinal contours intact. No acute osseous abnormalities.    IMPRESSION: See above report    LETY LONDONO M.D., ATTENDING RADIOLOGIST  This document has been electronically signed. Dec 23 2019  4:20PM     < end of copied text >     < from: 12 Lead ECG (12.23.19 @ 15:36) >    Ventricular Rate 86 BPM    Atrial Rate 86 BPM    P-R Interval 152 ms    QRS Duration 86 ms    Q-T Interval 370 ms    QTC Calculation(Bezet) 442 ms    P Axis 35 degrees    R Axis 6 degrees    T Axis -16 degrees    Diagnosis Line Normal sinus rhythm  Nonspecific ST abnormality  Confirmed by Jeovany France ([id number]) on 12/24/2019 2:33:00 PM    < end of copied text >

## 2019-12-30 NOTE — PROGRESS NOTE ADULT - SUBJECTIVE AND OBJECTIVE BOX
CAPILLARY BLOOD GLUCOSE      POCT Blood Glucose.: 187 mg/dL (29 Dec 2019 22:11)  POCT Blood Glucose.: 208 mg/dL (29 Dec 2019 16:28)  POCT Blood Glucose.: 273 mg/dL (29 Dec 2019 11:50)      Vital Signs Last 24 Hrs  T(C): 36.2 (30 Dec 2019 04:31), Max: 36.8 (29 Dec 2019 11:51)  T(F): 97.1 (30 Dec 2019 04:31), Max: 98.3 (29 Dec 2019 11:51)  HR: 64 (30 Dec 2019 04:31) (52 - 66)  BP: 121/76 (30 Dec 2019 04:31) (110/70 - 128/73)  BP(mean): --  RR: 16 (30 Dec 2019 04:31) (16 - 18)  SpO2: 96% (30 Dec 2019 04:31) (92% - 100%)    General: WN/WD NAD  Respiratory: CTA B/L  CV: RRR, S1S2, no murmurs, rubs or gallops  Abdominal: Soft, NT, ND +BS, Last BM  Extremities: No edema, + peripheral pulses             atorvastatin 80 milliGRAM(s) Oral at bedtime  dextrose 40% Gel 15 Gram(s) Oral once PRN  dextrose 50% Injectable 12.5 Gram(s) IV Push once  dextrose 50% Injectable 25 Gram(s) IV Push once  dextrose 50% Injectable 25 Gram(s) IV Push once  glucagon  Injectable 1 milliGRAM(s) IntraMuscular once PRN  insulin glargine Injectable (LANTUS) 15 Unit(s) SubCutaneous two times a day  insulin lispro (HumaLOG) corrective regimen sliding scale   SubCutaneous three times a day before meals  insulin lispro (HumaLOG) corrective regimen sliding scale   SubCutaneous at bedtime  insulin lispro Injectable (HumaLOG) 7 Unit(s) SubCutaneous three times a day before meals  methimazole 5 milliGRAM(s) Oral daily  predniSONE   Tablet 5 milliGRAM(s) Oral daily

## 2019-12-30 NOTE — PROGRESS NOTE ADULT - PROBLEM SELECTOR PLAN 1
cont lantus 15 units 2x/day  cont humalog 7 units 3x/day before meals  cont mod dose humalog scale coverage qac/qhs  cont cons cho diet  goal bg 100-180 in hosp setting

## 2019-12-30 NOTE — PROGRESS NOTE ADULT - SUBJECTIVE AND OBJECTIVE BOX
Patient is a 49y old  Male who presents with a chief complaint of R sided numbness (30 Dec 2019 07:51)      INTERVAL HPI/OVERNIGHT EVENTS: Patient seen and examined. NAD. No complaints.     Vital Signs Last 24 Hrs  T(C): 36.8 (30 Dec 2019 08:20), Max: 36.8 (29 Dec 2019 11:51)  T(F): 98.2 (30 Dec 2019 08:20), Max: 98.3 (29 Dec 2019 11:51)  HR: 62 (30 Dec 2019 08:20) (58 - 66)  BP: 112/72 (30 Dec 2019 08:20) (110/70 - 125/77)  BP(mean): --  RR: 18 (30 Dec 2019 08:20) (16 - 18)  SpO2: 96% (30 Dec 2019 08:20) (96% - 100%)              CAPILLARY BLOOD GLUCOSE      POCT Blood Glucose.: 172 mg/dL (30 Dec 2019 07:50)  POCT Blood Glucose.: 187 mg/dL (29 Dec 2019 22:11)  POCT Blood Glucose.: 208 mg/dL (29 Dec 2019 16:28)  POCT Blood Glucose.: 273 mg/dL (29 Dec 2019 11:50)              acetaminophen   Tablet .. 650 milliGRAM(s) Oral every 6 hours PRN  apixaban 5 milliGRAM(s) Oral every 12 hours  atorvastatin 80 milliGRAM(s) Oral at bedtime  bethanechol 25 milliGRAM(s) Oral three times a day  clopidogrel Tablet 75 milliGRAM(s) Oral daily  dextrose 40% Gel 15 Gram(s) Oral once PRN  dextrose 5%. 1000 milliLiter(s) IV Continuous <Continuous>  dextrose 50% Injectable 12.5 Gram(s) IV Push once  dextrose 50% Injectable 25 Gram(s) IV Push once  dextrose 50% Injectable 25 Gram(s) IV Push once  glucagon  Injectable 1 milliGRAM(s) IntraMuscular once PRN  insulin glargine Injectable (LANTUS) 15 Unit(s) SubCutaneous two times a day  insulin lispro (HumaLOG) corrective regimen sliding scale   SubCutaneous three times a day before meals  insulin lispro (HumaLOG) corrective regimen sliding scale   SubCutaneous at bedtime  insulin lispro Injectable (HumaLOG) 7 Unit(s) SubCutaneous three times a day before meals  isosorbide   mononitrate ER Tablet (IMDUR) 30 milliGRAM(s) Oral daily  losartan 25 milliGRAM(s) Oral daily  methimazole 5 milliGRAM(s) Oral daily  metoprolol tartrate 25 milliGRAM(s) Oral every 12 hours  pantoprazole    Tablet 40 milliGRAM(s) Oral before breakfast  predniSONE   Tablet 5 milliGRAM(s) Oral daily  sucralfate 1 Gram(s) Oral four times a day  tamsulosin 0.4 milliGRAM(s) Oral at bedtime              REVIEW OF SYSTEMS:  CONSTITUTIONAL: No fever, no weight loss, or no fatigue  NECK: No pain, no stiffness  RESPIRATORY: No cough, no wheezing, no chills, no hemoptysis, No shortness of breath  CARDIOVASCULAR: No chest pain, no palpitations, no dizziness, no leg swelling  GASTROINTESTINAL: No abdominal pain. No nausea, no vomiting, no hematemesis; No diarrhea, no constipation. No melena, no hematochezia.  GENITOURINARY: No dysuria, no frequency, no hematuria, no incontinence  NEUROLOGICAL: No headaches, no loss of strength, no numbness, no tremors  SKIN: No itching, no burning  MUSCULOSKELETAL: No joint pain, no swelling; No muscle, no back, no extremity pain  PSYCHIATRIC: No depression, no mood swings,   HEME/LYMPH: No easy bruising, no bleeding gums  ALLERY AND IMMUNOLOGIC: No hives       Consultant(s) Notes Reviewed:  [X] YES  [ ] NO    PHYSICAL EXAM:  GENERAL: NAD  HEAD:  Atraumatic, Normocephalic  EYES: EOMI, PERRLA, conjunctiva and sclera clear  ENMT: No tonsillar erythema, exudates, or enlargement; Moist mucous membranes  NECK: Supple, No JVD  NERVOUS SYSTEM:  Awake & alert  CHEST/LUNG: Clear to auscultation bilaterally; No rales, rhonchi, wheezing,  HEART: Regular rate and rhythm  ABDOMEN: Soft, Nontender, Nondistended; Bowel sounds present  EXTREMITIES:  No clubbing, cyanosis, or edema  LYMPH: No lymphadenopathy noted  SKIN: No rashes      Advanced care planning discussed with patient/family [X] YES   [ ] NO    Advanced care planning discussed with patient/family. Advanced care planning forms reviewed/discussed/completed. 20 minutes spent.

## 2019-12-30 NOTE — DIETITIAN INITIAL EVALUATION ADULT. - ADD RECOMMEND
1) Continue with current DASH/TLC, Consistent CHO diet, 2) Pt provided with oral and written education on Consistent CHO diet; topics covered: consuming consistent amount of CHO throughout the day, meal planning/ consuming CHO with protein, simple vs. complex CHO, 3) Monitor pt's PO intake, weight, skin, edema, GI distress

## 2019-12-30 NOTE — DIETITIAN INITIAL EVALUATION ADULT. - OTHER INFO
As per chart pt is a 49 year old male with a PMH of  CAD s/p BMS to RCA (8/2019), RUL subsegmental PE (6/2019, on Eliquis), hx of NSTEMI and PAF s/p ex-lap omentopexy and plication for perforated antral ulcer (5/2019), osteomyelitis s/p debridement, CVA s/p tPA (8/2019), HTN and DM2 (not on insulin) presenting to the ED with right sided weakness, facial droop, and slurred speech.    Pt reports goof appetite and PO intake PTA. Pt reports he follows a diabetic diet PTA, how question truthfulness as pt's diet recall seems rehearsed as pt made it seem like he was eating all healthy meals however he also stated he ate "fish", which the he ended up taking back and saying that he's allergic to fish. Pt reports he checks his BG levels 4x day, usual reading is reading 110-140, states he takes Lantus and Humalog PTA. Pt's current HgbA1c 8.4%, indicates poor control. When pt was questioned about high HgbA1c he stated that he had not been taking his insulin for a few months secondary to loosing his insurance, states that he now has insurance again and has started taking the insulin again and wants to try to lower his HgbA1c. Denies any vitamin/ mineral supplementation. Confirms food allergy to fish.    Pt repots currently good appetite and PO intake, per chart pt is consuming about 100% of meals in house. Pt's current weights (12/26) 218.9lbs, (12/24) 223.7lbs, (admission wt) 205lbs. Pt reports current and UBW of 205lbs, stable, denies any recent weight changes. Denies any chewing/ swallowing difficulties, denies any nausea/vomiting/ diarrhea/ constipation, +BM 12/29.    No edema or pressure injuries noted at this time

## 2019-12-31 PROCEDURE — 99232 SBSQ HOSP IP/OBS MODERATE 35: CPT

## 2019-12-31 RX ORDER — INSULIN LISPRO 100/ML
9 VIAL (ML) SUBCUTANEOUS
Refills: 0 | Status: DISCONTINUED | OUTPATIENT
Start: 2019-12-31 | End: 2020-01-06

## 2019-12-31 RX ADMIN — Medication 25 MILLIGRAM(S): at 17:18

## 2019-12-31 RX ADMIN — APIXABAN 5 MILLIGRAM(S): 2.5 TABLET, FILM COATED ORAL at 05:12

## 2019-12-31 RX ADMIN — Medication 25 MILLIGRAM(S): at 23:07

## 2019-12-31 RX ADMIN — APIXABAN 5 MILLIGRAM(S): 2.5 TABLET, FILM COATED ORAL at 17:18

## 2019-12-31 RX ADMIN — ISOSORBIDE MONONITRATE 30 MILLIGRAM(S): 60 TABLET, EXTENDED RELEASE ORAL at 11:45

## 2019-12-31 RX ADMIN — Medication 4: at 11:49

## 2019-12-31 RX ADMIN — Medication 1 GRAM(S): at 11:45

## 2019-12-31 RX ADMIN — Medication 25 MILLIGRAM(S): at 13:10

## 2019-12-31 RX ADMIN — ATORVASTATIN CALCIUM 80 MILLIGRAM(S): 80 TABLET, FILM COATED ORAL at 23:08

## 2019-12-31 RX ADMIN — PANTOPRAZOLE SODIUM 40 MILLIGRAM(S): 20 TABLET, DELAYED RELEASE ORAL at 05:12

## 2019-12-31 RX ADMIN — TAMSULOSIN HYDROCHLORIDE 0.4 MILLIGRAM(S): 0.4 CAPSULE ORAL at 23:07

## 2019-12-31 RX ADMIN — CLOPIDOGREL BISULFATE 75 MILLIGRAM(S): 75 TABLET, FILM COATED ORAL at 11:45

## 2019-12-31 RX ADMIN — Medication 1 GRAM(S): at 17:18

## 2019-12-31 RX ADMIN — Medication 9 UNIT(S): at 17:19

## 2019-12-31 RX ADMIN — Medication 1 GRAM(S): at 23:08

## 2019-12-31 RX ADMIN — INSULIN GLARGINE 15 UNIT(S): 100 INJECTION, SOLUTION SUBCUTANEOUS at 07:56

## 2019-12-31 RX ADMIN — Medication 5 MILLIGRAM(S): at 05:12

## 2019-12-31 RX ADMIN — Medication 25 MILLIGRAM(S): at 05:12

## 2019-12-31 RX ADMIN — Medication 1 GRAM(S): at 05:12

## 2019-12-31 RX ADMIN — Medication 9 UNIT(S): at 11:49

## 2019-12-31 NOTE — PROGRESS NOTE ADULT - PROBLEM SELECTOR PLAN 1
cont lantus 15 units 2x/day  increase humalog 9 units 3x/day before meals  cont mod dose humalog scale coverage  cont cons cho diet  goal bg 100-180 in hosp setting

## 2019-12-31 NOTE — PROGRESS NOTE ADULT - ASSESSMENT
48 yo male with PMHx significant of CAD s/p BMS to RCA (8/2019), RUL subsegmental PE (6/2019, on Eliquis), hx of NSTEMI and PAF s/p ex-lap omentopexy and plication for perforated antral ulcer (5/2019), osteomyelitis s/p debridement, CVA s/p tPA (8/2019), HTN and DM2 (not on insulin) presenting to the ED with right sided weakness, facial droop, and slurred speech.    Hx CVA    - MR head negative.   - EEG unremarkable   - Continue Plavix and high-intensity statin  - Neuro following    Afib  -has maintained NSR   - Amiodarone was previously discontinued as it may have been causing his thyroid issues.  - Continue with Lopressor BID with hold parameters  - Continue with ELiquis BID  - Echo 12/24 as delineated above revealing severe LVH, normal systolic function ef 65% grade 2 diastolic dysfunction, mild MR, trace TR   - Monitor electrolytes, replete to keep K>4 and Mag>2    CAD s/p PCI  - No symptoms suggestive of ischemia  - Continue Plavix 75 QD  - Continue Statin  - Continue Imdur  - Continue ARB and beta blocker     Awaiting D/C to TORIN Alvarez FNP-C  Cardiology NP  [facility] [id number] [phone]

## 2019-12-31 NOTE — PROGRESS NOTE ADULT - SUBJECTIVE AND OBJECTIVE BOX
Patient is a 49y old  Male who presents with a chief complaint of R sided numbness (31 Dec 2019 08:39)      INTERVAL HPI/OVERNIGHT EVENTS: Patient seen and examined. NAD. No complaints.    Vital Signs Last 24 Hrs  T(C): 36.7 (31 Dec 2019 11:44), Max: 36.9 (30 Dec 2019 19:44)  T(F): 98 (31 Dec 2019 11:44), Max: 98.5 (30 Dec 2019 19:44)  HR: 67 (31 Dec 2019 11:44) (54 - 67)  BP: 151/77 (31 Dec 2019 11:44) (98/65 - 151/77)  BP(mean): --  RR: 18 (31 Dec 2019 11:44) (17 - 18)  SpO2: 98% (31 Dec 2019 11:44) (95% - 98%)              CAPILLARY BLOOD GLUCOSE      POCT Blood Glucose.: 219 mg/dL (31 Dec 2019 11:47)  POCT Blood Glucose.: 105 mg/dL (31 Dec 2019 07:54)  POCT Blood Glucose.: 131 mg/dL (30 Dec 2019 23:54)  POCT Blood Glucose.: 208 mg/dL (30 Dec 2019 16:03)              acetaminophen   Tablet .. 650 milliGRAM(s) Oral every 6 hours PRN  apixaban 5 milliGRAM(s) Oral every 12 hours  atorvastatin 80 milliGRAM(s) Oral at bedtime  bethanechol 25 milliGRAM(s) Oral three times a day  clopidogrel Tablet 75 milliGRAM(s) Oral daily  dextrose 40% Gel 15 Gram(s) Oral once PRN  dextrose 5%. 1000 milliLiter(s) IV Continuous <Continuous>  dextrose 50% Injectable 12.5 Gram(s) IV Push once  dextrose 50% Injectable 25 Gram(s) IV Push once  dextrose 50% Injectable 25 Gram(s) IV Push once  glucagon  Injectable 1 milliGRAM(s) IntraMuscular once PRN  insulin glargine Injectable (LANTUS) 15 Unit(s) SubCutaneous two times a day  insulin lispro (HumaLOG) corrective regimen sliding scale   SubCutaneous three times a day before meals  insulin lispro (HumaLOG) corrective regimen sliding scale   SubCutaneous at bedtime  insulin lispro Injectable (HumaLOG) 9 Unit(s) SubCutaneous three times a day before meals  isosorbide   mononitrate ER Tablet (IMDUR) 30 milliGRAM(s) Oral daily  losartan 25 milliGRAM(s) Oral daily  methimazole 5 milliGRAM(s) Oral daily  metoprolol tartrate 25 milliGRAM(s) Oral every 12 hours  pantoprazole    Tablet 40 milliGRAM(s) Oral before breakfast  predniSONE   Tablet 5 milliGRAM(s) Oral daily  sucralfate 1 Gram(s) Oral four times a day  tamsulosin 0.4 milliGRAM(s) Oral at bedtime              REVIEW OF SYSTEMS:  CONSTITUTIONAL: No fever, no weight loss, or no fatigue  NECK: No pain, no stiffness  RESPIRATORY: No cough, no wheezing, no chills, no hemoptysis, No shortness of breath  CARDIOVASCULAR: No chest pain, no palpitations, no dizziness, no leg swelling  GASTROINTESTINAL: No abdominal pain. No nausea, no vomiting, no hematemesis; No diarrhea, no constipation. No melena, no hematochezia.  GENITOURINARY: No dysuria, no frequency, no hematuria, no incontinence  NEUROLOGICAL: No headaches, no loss of strength, no numbness, no tremors  SKIN: No itching, no burning  MUSCULOSKELETAL: No joint pain, no swelling; No muscle, no back, no extremity pain  PSYCHIATRIC: No depression, no mood swings,   HEME/LYMPH: No easy bruising, no bleeding gums  ALLERY AND IMMUNOLOGIC: No hives       Consultant(s) Notes Reviewed:  [X] YES  [ ] NO    PHYSICAL EXAM:  GENERAL: NAD  HEAD:  Atraumatic, Normocephalic  EYES: EOMI, PERRLA, conjunctiva and sclera clear  ENMT: No tonsillar erythema, exudates, or enlargement; Moist mucous membranes  NECK: Supple, No JVD  NERVOUS SYSTEM:  Awake & alert  CHEST/LUNG: Clear to auscultation bilaterally; No rales, rhonchi, wheezing,  HEART: Regular rate and rhythm  ABDOMEN: Soft, Nontender, Nondistended; Bowel sounds present  EXTREMITIES:  No clubbing, cyanosis, or edema  LYMPH: No lymphadenopathy noted  SKIN: No rashes      Advanced care planning discussed with patient/family [X] YES   [ ] NO    Advanced care planning discussed with patient/family. Advanced care planning forms reviewed/discussed/completed. 20 minutes spent.

## 2019-12-31 NOTE — PROGRESS NOTE ADULT - SUBJECTIVE AND OBJECTIVE BOX
[facility] -- Bolivar Carbajal, El, Brittany, Reynaldo Sweeney Savella  Office # [phone]      Follow Up:    CAD  Subjective/Observations:   No events overnight resting comfortably in bed.  No complaints of chest pain, dyspnea, or palpitations reported. No signs of orthopnea or PND.      REVIEW OF SYSTEMS: All other review of systems is negative unless indicated above    PAST MEDICAL & SURGICAL HISTORY:  Cerebrovascular accident  CAD S/P percutaneous coronary angioplasty  Osteomyelitis: s/p debridement  History of non-ST elevation myocardial infarction (NSTEMI)  Pulmonary embolism  Perforated gastric ulcer: s/p emergent ex-lap omentopexy and plication 6/2019  BPH (benign prostatic hyperplasia)  Hypertension  Afib  Diabetes mellitus with no complication  Diabetes  Traumatic amputation of left foot, initial encounter  Perforated gastric ulcer  H/O abdominal surgery      MEDICATIONS  (STANDING):  apixaban 5 milliGRAM(s) Oral every 12 hours  atorvastatin 80 milliGRAM(s) Oral at bedtime  bethanechol 25 milliGRAM(s) Oral three times a day  clopidogrel Tablet 75 milliGRAM(s) Oral daily  dextrose 5%. 1000 milliLiter(s) (50 mL/Hr) IV Continuous <Continuous>  dextrose 50% Injectable 12.5 Gram(s) IV Push once  dextrose 50% Injectable 25 Gram(s) IV Push once  dextrose 50% Injectable 25 Gram(s) IV Push once  insulin glargine Injectable (LANTUS) 15 Unit(s) SubCutaneous two times a day  insulin lispro (HumaLOG) corrective regimen sliding scale   SubCutaneous three times a day before meals  insulin lispro (HumaLOG) corrective regimen sliding scale   SubCutaneous at bedtime  insulin lispro Injectable (HumaLOG) 9 Unit(s) SubCutaneous three times a day before meals  isosorbide   mononitrate ER Tablet (IMDUR) 30 milliGRAM(s) Oral daily  losartan 25 milliGRAM(s) Oral daily  methimazole 5 milliGRAM(s) Oral daily  metoprolol tartrate 25 milliGRAM(s) Oral every 12 hours  pantoprazole    Tablet 40 milliGRAM(s) Oral before breakfast  predniSONE   Tablet 5 milliGRAM(s) Oral daily  sucralfate 1 Gram(s) Oral four times a day  tamsulosin 0.4 milliGRAM(s) Oral at bedtime    MEDICATIONS  (PRN):  acetaminophen   Tablet .. 650 milliGRAM(s) Oral every 6 hours PRN Temp greater or equal to 38C (100.4F), Mild Pain (1 - 3)  dextrose 40% Gel 15 Gram(s) Oral once PRN Blood Glucose LESS THAN 70 milliGRAM(s)/deciLiter  glucagon  Injectable 1 milliGRAM(s) IntraMuscular once PRN Glucose <70 milliGRAM(s)/deciLiter      Allergies    fish (Hives)  No Known Drug Allergies    Intolerances        Vital Signs Last 24 Hrs  T(C): 36.7 (31 Dec 2019 08:31), Max: 36.9 (30 Dec 2019 19:44)  T(F): 98 (31 Dec 2019 08:31), Max: 98.5 (30 Dec 2019 19:44)  HR: 60 (31 Dec 2019 08:31) (54 - 65)  BP: 112/70 (31 Dec 2019 08:31) (98/65 - 131/83)  BP(mean): --  RR: 17 (31 Dec 2019 08:31) (17 - 18)  SpO2: 95% (31 Dec 2019 08:31) (95% - 98%)    I&O's Summary    30 Dec 2019 07:01  -  31 Dec 2019 07:00  --------------------------------------------------------  IN: 240 mL / OUT: 2800 mL / NET: -2560 mL          PHYSICAL EXAM:  TELE: not on tele   Constitutional: NAD, awake and alert, well-developed  HEENT: Moist Mucous Membranes, Anicteric  Pulmonary: Non-labored, breath sounds are clear bilaterally, No wheezing, crackles or rhonchi  Cardiovascular: Regular, S1 and S2 nl, No murmurs, rubs, gallops or clicks  Gastrointestinal: Bowel Sounds present, soft, nontender.   Lymph: No lymphadenopathy. No peripheral edema.  Skin: No visible rashes or ulcers.  Psych:  Mood & affect appropriate    LABS: All Labs Reviewed:                   ECG:  < from: 12 Lead ECG (12.23.19 @ 15:36) >    Ventricular Rate 86 BPM    Atrial Rate 86 BPM    P-R Interval 152 ms    QRS Duration 86 ms    Q-T Interval 370 ms    QTC Calculation(Bezet) 442 ms    P Axis 35 degrees    R Axis 6 degrees    T Axis -16 degrees    Diagnosis Line Normal sinus rhythm  Nonspecific ST abnormality    < end of copied text >      Echo:    Radiology:           Ed Paula ANP   Cardiology

## 2019-12-31 NOTE — PROGRESS NOTE ADULT - SUBJECTIVE AND OBJECTIVE BOX
CAPILLARY BLOOD GLUCOSE      POCT Blood Glucose.: 131 mg/dL (30 Dec 2019 23:54)  POCT Blood Glucose.: 208 mg/dL (30 Dec 2019 16:03)  POCT Blood Glucose.: 211 mg/dL (30 Dec 2019 11:37)  POCT Blood Glucose.: 172 mg/dL (30 Dec 2019 07:50)      Vital Signs Last 24 Hrs  T(C): 36.2 (31 Dec 2019 05:18), Max: 36.9 (30 Dec 2019 19:44)  T(F): 97.1 (31 Dec 2019 05:18), Max: 98.5 (30 Dec 2019 19:44)  HR: 59 (31 Dec 2019 05:18) (54 - 65)  BP: 98/65 (31 Dec 2019 05:18) (98/65 - 131/83)  BP(mean): --  RR: 18 (31 Dec 2019 05:18) (18 - 18)  SpO2: 96% (31 Dec 2019 05:18) (96% - 98%)    General: WN/WD NAD  Respiratory: CTA B/L  CV: RRR, S1S2, no murmurs, rubs or gallops  Abdominal: Soft, NT, ND +BS, Last BM  Extremities: No edema, + peripheral pulses             atorvastatin 80 milliGRAM(s) Oral at bedtime  dextrose 40% Gel 15 Gram(s) Oral once PRN  dextrose 50% Injectable 12.5 Gram(s) IV Push once  dextrose 50% Injectable 25 Gram(s) IV Push once  dextrose 50% Injectable 25 Gram(s) IV Push once  glucagon  Injectable 1 milliGRAM(s) IntraMuscular once PRN  insulin glargine Injectable (LANTUS) 15 Unit(s) SubCutaneous two times a day  insulin lispro (HumaLOG) corrective regimen sliding scale   SubCutaneous three times a day before meals  insulin lispro (HumaLOG) corrective regimen sliding scale   SubCutaneous at bedtime  insulin lispro Injectable (HumaLOG) 7 Unit(s) SubCutaneous three times a day before meals  methimazole 5 milliGRAM(s) Oral daily  predniSONE   Tablet 5 milliGRAM(s) Oral daily

## 2020-01-01 PROCEDURE — 99232 SBSQ HOSP IP/OBS MODERATE 35: CPT

## 2020-01-01 RX ADMIN — Medication 1 GRAM(S): at 17:14

## 2020-01-01 RX ADMIN — ISOSORBIDE MONONITRATE 30 MILLIGRAM(S): 60 TABLET, EXTENDED RELEASE ORAL at 11:08

## 2020-01-01 RX ADMIN — INSULIN GLARGINE 15 UNIT(S): 100 INJECTION, SOLUTION SUBCUTANEOUS at 08:50

## 2020-01-01 RX ADMIN — Medication 1 GRAM(S): at 11:08

## 2020-01-01 RX ADMIN — APIXABAN 5 MILLIGRAM(S): 2.5 TABLET, FILM COATED ORAL at 17:14

## 2020-01-01 RX ADMIN — CLOPIDOGREL BISULFATE 75 MILLIGRAM(S): 75 TABLET, FILM COATED ORAL at 11:08

## 2020-01-01 RX ADMIN — TAMSULOSIN HYDROCHLORIDE 0.4 MILLIGRAM(S): 0.4 CAPSULE ORAL at 21:51

## 2020-01-01 RX ADMIN — Medication 9 UNIT(S): at 12:08

## 2020-01-01 RX ADMIN — LOSARTAN POTASSIUM 25 MILLIGRAM(S): 100 TABLET, FILM COATED ORAL at 06:56

## 2020-01-01 RX ADMIN — Medication 1 GRAM(S): at 06:57

## 2020-01-01 RX ADMIN — Medication 4: at 12:08

## 2020-01-01 RX ADMIN — Medication 5 MILLIGRAM(S): at 06:57

## 2020-01-01 RX ADMIN — Medication 25 MILLIGRAM(S): at 06:57

## 2020-01-01 RX ADMIN — INSULIN GLARGINE 15 UNIT(S): 100 INJECTION, SOLUTION SUBCUTANEOUS at 21:52

## 2020-01-01 RX ADMIN — PANTOPRAZOLE SODIUM 40 MILLIGRAM(S): 20 TABLET, DELAYED RELEASE ORAL at 06:57

## 2020-01-01 RX ADMIN — INSULIN GLARGINE 15 UNIT(S): 100 INJECTION, SOLUTION SUBCUTANEOUS at 00:31

## 2020-01-01 RX ADMIN — Medication 9 UNIT(S): at 17:37

## 2020-01-01 RX ADMIN — Medication 25 MILLIGRAM(S): at 11:08

## 2020-01-01 RX ADMIN — Medication 25 MILLIGRAM(S): at 21:51

## 2020-01-01 RX ADMIN — APIXABAN 5 MILLIGRAM(S): 2.5 TABLET, FILM COATED ORAL at 06:56

## 2020-01-01 RX ADMIN — Medication 2: at 17:36

## 2020-01-01 RX ADMIN — Medication 25 MILLIGRAM(S): at 06:56

## 2020-01-01 RX ADMIN — ATORVASTATIN CALCIUM 80 MILLIGRAM(S): 80 TABLET, FILM COATED ORAL at 21:51

## 2020-01-01 NOTE — PROGRESS NOTE ADULT - ASSESSMENT
50 yo male with PMHx significant of CAD s/p BMS to RCA (8/2019), RUL subsegmental PE (6/2019, on Eliquis), hx of NSTEMI and PAF s/p ex-lap omentopexy and plication for perforated antral ulcer (5/2019), osteomyelitis s/p debridement, CVA s/p tPA (8/2019), HTN and DM2 (not on insulin) presenting to the ED with right sided weakness, facial droop, and slurred speech.    Hx CVA  - MR head negative.   - EEG unremarkable   - Continue Plavix and high-intensity statin  - Neuro following    Afib  -has maintained NSR   - Amiodarone was previously discontinued as it may have been causing his thyroid issues.  - Continue with Lopressor BID with hold parameters  - Continue with ELiquis BID  - Echo 12/24 as delineated above revealing severe LVH, normal systolic function ef 65% grade 2 diastolic dysfunction, mild MR, trace TR   - Monitor electrolytes, replete to keep K>4 and Mag>2    CAD s/p PCI  - No symptoms suggestive of ischemia  - Continue Plavix 75 QD  - Continue Statin  - Continue Imdur  - Continue ARB and beta blocker     Awaiting D/C to TORIN Paula [facility]  Cardiology   Spectra #[phone]

## 2020-01-01 NOTE — PROGRESS NOTE ADULT - SUBJECTIVE AND OBJECTIVE BOX
[facility] -- Bolivar Carbajal, El, Brittany, Reynaldo Sweeney Savella  Office # [phone]      Follow Up:    CAD  Subjective/Observations:   No events overnight resting comfortably in bed.  No complaints of chest pain, dyspnea, or palpitations reported. No signs of orthopnea or PND.     REVIEW OF SYSTEMS: All other review of systems is negative unless indicated above    PAST MEDICAL & SURGICAL HISTORY:  Cerebrovascular accident  CAD S/P percutaneous coronary angioplasty  Osteomyelitis: s/p debridement  History of non-ST elevation myocardial infarction (NSTEMI)  Pulmonary embolism  Perforated gastric ulcer: s/p emergent ex-lap omentopexy and plication 6/2019  BPH (benign prostatic hyperplasia)  Hypertension  Afib  Diabetes mellitus with no complication  Diabetes  Traumatic amputation of left foot, initial encounter  Perforated gastric ulcer  H/O abdominal surgery      MEDICATIONS  (STANDING):  apixaban 5 milliGRAM(s) Oral every 12 hours  atorvastatin 80 milliGRAM(s) Oral at bedtime  bethanechol 25 milliGRAM(s) Oral three times a day  clopidogrel Tablet 75 milliGRAM(s) Oral daily  dextrose 5%. 1000 milliLiter(s) (50 mL/Hr) IV Continuous <Continuous>  dextrose 50% Injectable 12.5 Gram(s) IV Push once  dextrose 50% Injectable 25 Gram(s) IV Push once  dextrose 50% Injectable 25 Gram(s) IV Push once  insulin glargine Injectable (LANTUS) 15 Unit(s) SubCutaneous two times a day  insulin lispro (HumaLOG) corrective regimen sliding scale   SubCutaneous three times a day before meals  insulin lispro (HumaLOG) corrective regimen sliding scale   SubCutaneous at bedtime  insulin lispro Injectable (HumaLOG) 9 Unit(s) SubCutaneous three times a day before meals  isosorbide   mononitrate ER Tablet (IMDUR) 30 milliGRAM(s) Oral daily  losartan 25 milliGRAM(s) Oral daily  methimazole 5 milliGRAM(s) Oral daily  metoprolol tartrate 25 milliGRAM(s) Oral every 12 hours  pantoprazole    Tablet 40 milliGRAM(s) Oral before breakfast  predniSONE   Tablet 5 milliGRAM(s) Oral daily  sucralfate 1 Gram(s) Oral four times a day  tamsulosin 0.4 milliGRAM(s) Oral at bedtime    MEDICATIONS  (PRN):  acetaminophen   Tablet .. 650 milliGRAM(s) Oral every 6 hours PRN Temp greater or equal to 38C (100.4F), Mild Pain (1 - 3)  dextrose 40% Gel 15 Gram(s) Oral once PRN Blood Glucose LESS THAN 70 milliGRAM(s)/deciLiter  glucagon  Injectable 1 milliGRAM(s) IntraMuscular once PRN Glucose <70 milliGRAM(s)/deciLiter      Allergies    fish (Hives)  No Known Drug Allergies    Intolerances        Vital Signs Last 24 Hrs  T(C): 36.3 (01 Jan 2020 07:50), Max: 36.7 (31 Dec 2019 11:44)  T(F): 97.4 (01 Jan 2020 07:50), Max: 98 (31 Dec 2019 11:44)  HR: 55 (01 Jan 2020 07:50) (51 - 67)  BP: 133/76 (01 Jan 2020 07:50) (119/77 - 151/77)  BP(mean): --  RR: 18 (01 Jan 2020 07:50) (18 - 18)  SpO2: 98% (01 Jan 2020 07:50) (97% - 99%)    I&O's Summary    31 Dec 2019 07:01  -  01 Jan 2020 07:00  --------------------------------------------------------  IN: 0 mL / OUT: 3900 mL / NET: -3900 mL          PHYSICAL EXAM:  TELE: Off tele   Constitutional: NAD, awake and alert, well-developed  HEENT: Moist Mucous Membranes, Anicteric  Pulmonary: Non-labored, breath sounds are clear bilaterally, No wheezing, crackles or rhonchi  Cardiovascular: Regular, S1 and S2 nl, No murmurs, rubs, gallops or clicks  Gastrointestinal: Bowel Sounds present, soft, nontender.   Lymph: No lymphadenopathy. No peripheral edema.  Skin: No visible rashes or ulcers.  Psych:  Mood & affect appropriate    LABS: All Labs Reviewed:    ECG:  < from: 12 Lead ECG (12.23.19 @ 15:36) >    Ventricular Rate 86 BPM    Atrial Rate 86 BPM    P-R Interval 152 ms    QRS Duration 86 ms    Q-T Interval 370 ms    QTC Calculation(Bezet) 442 ms    P Axis 35 degrees    R Axis 6 degrees    T Axis -16 degrees    Diagnosis Line Normal sinus rhythm  Nonspecific ST abnormality    < end of copied text >

## 2020-01-01 NOTE — PROGRESS NOTE ADULT - SUBJECTIVE AND OBJECTIVE BOX
Patient is a 49y old  Male who presents with a chief complaint of R sided numbness (01 Jan 2020 09:25)      INTERVAL HPI/OVERNIGHT EVENTS: Patient seen and examined. NAD. No complaints.    Vital Signs Last 24 Hrs  T(C): 36.3 (01 Jan 2020 07:50), Max: 36.7 (31 Dec 2019 19:39)  T(F): 97.4 (01 Jan 2020 07:50), Max: 98 (31 Dec 2019 19:39)  HR: 55 (01 Jan 2020 07:50) (51 - 67)  BP: 133/76 (01 Jan 2020 07:50) (119/77 - 135/83)  BP(mean): --  RR: 18 (01 Jan 2020 07:50) (18 - 18)  SpO2: 98% (01 Jan 2020 07:50) (97% - 99%)              CAPILLARY BLOOD GLUCOSE      POCT Blood Glucose.: 204 mg/dL (01 Jan 2020 11:50)  POCT Blood Glucose.: 97 mg/dL (01 Jan 2020 07:56)  POCT Blood Glucose.: 166 mg/dL (01 Jan 2020 00:04)  POCT Blood Glucose.: 88 mg/dL (31 Dec 2019 22:54)  POCT Blood Glucose.: 109 mg/dL (31 Dec 2019 17:13)              acetaminophen   Tablet .. 650 milliGRAM(s) Oral every 6 hours PRN  apixaban 5 milliGRAM(s) Oral every 12 hours  atorvastatin 80 milliGRAM(s) Oral at bedtime  bethanechol 25 milliGRAM(s) Oral three times a day  clopidogrel Tablet 75 milliGRAM(s) Oral daily  dextrose 40% Gel 15 Gram(s) Oral once PRN  dextrose 5%. 1000 milliLiter(s) IV Continuous <Continuous>  dextrose 50% Injectable 12.5 Gram(s) IV Push once  dextrose 50% Injectable 25 Gram(s) IV Push once  dextrose 50% Injectable 25 Gram(s) IV Push once  glucagon  Injectable 1 milliGRAM(s) IntraMuscular once PRN  insulin glargine Injectable (LANTUS) 15 Unit(s) SubCutaneous two times a day  insulin lispro (HumaLOG) corrective regimen sliding scale   SubCutaneous three times a day before meals  insulin lispro (HumaLOG) corrective regimen sliding scale   SubCutaneous at bedtime  insulin lispro Injectable (HumaLOG) 9 Unit(s) SubCutaneous three times a day before meals  isosorbide   mononitrate ER Tablet (IMDUR) 30 milliGRAM(s) Oral daily  losartan 25 milliGRAM(s) Oral daily  methimazole 5 milliGRAM(s) Oral daily  metoprolol tartrate 25 milliGRAM(s) Oral every 12 hours  pantoprazole    Tablet 40 milliGRAM(s) Oral before breakfast  predniSONE   Tablet 5 milliGRAM(s) Oral daily  sucralfate 1 Gram(s) Oral four times a day  tamsulosin 0.4 milliGRAM(s) Oral at bedtime              REVIEW OF SYSTEMS:  CONSTITUTIONAL: No fever, no weight loss, or no fatigue  NECK: No pain, no stiffness  RESPIRATORY: No cough, no wheezing, no chills, no hemoptysis, No shortness of breath  CARDIOVASCULAR: No chest pain, no palpitations, no dizziness, no leg swelling  GASTROINTESTINAL: No abdominal pain. No nausea, no vomiting, no hematemesis; No diarrhea, no constipation. No melena, no hematochezia.  GENITOURINARY: No dysuria, no frequency, no hematuria, no incontinence  NEUROLOGICAL: No headaches, no loss of strength, no numbness, no tremors  SKIN: No itching, no burning  MUSCULOSKELETAL: No joint pain, no swelling; No muscle, no back, no extremity pain  PSYCHIATRIC: No depression, no mood swings,   HEME/LYMPH: No easy bruising, no bleeding gums  ALLERY AND IMMUNOLOGIC: No hives       Consultant(s) Notes Reviewed:  [X] YES  [ ] NO    PHYSICAL EXAM:  GENERAL: NAD  HEAD:  Atraumatic, Normocephalic  EYES: EOMI, PERRLA, conjunctiva and sclera clear  ENMT: No tonsillar erythema, exudates, or enlargement; Moist mucous membranes  NECK: Supple, No JVD  NERVOUS SYSTEM:  Awake & alert  CHEST/LUNG: Clear to auscultation bilaterally; No rales, rhonchi, wheezing,  HEART: Regular rate and rhythm  ABDOMEN: Soft, Nontender, Nondistended; Bowel sounds present  EXTREMITIES:  No clubbing, cyanosis, or edema  LYMPH: No lymphadenopathy noted  SKIN: No rashes      Advanced care planning discussed with patient/family [X] YES   [ ] NO    Advanced care planning discussed with patient/family. Advanced care planning forms reviewed/discussed/completed. 20 minutes spent.

## 2020-01-02 ENCOUNTER — TRANSCRIPTION ENCOUNTER (OUTPATIENT)
Age: 50
End: 2020-01-02

## 2020-01-02 PROCEDURE — 99232 SBSQ HOSP IP/OBS MODERATE 35: CPT

## 2020-01-02 RX ADMIN — Medication 1 GRAM(S): at 11:57

## 2020-01-02 RX ADMIN — Medication 25 MILLIGRAM(S): at 21:36

## 2020-01-02 RX ADMIN — ISOSORBIDE MONONITRATE 30 MILLIGRAM(S): 60 TABLET, EXTENDED RELEASE ORAL at 11:57

## 2020-01-02 RX ADMIN — LOSARTAN POTASSIUM 25 MILLIGRAM(S): 100 TABLET, FILM COATED ORAL at 05:46

## 2020-01-02 RX ADMIN — Medication 2: at 17:07

## 2020-01-02 RX ADMIN — Medication 1 GRAM(S): at 00:10

## 2020-01-02 RX ADMIN — ATORVASTATIN CALCIUM 80 MILLIGRAM(S): 80 TABLET, FILM COATED ORAL at 21:35

## 2020-01-02 RX ADMIN — TAMSULOSIN HYDROCHLORIDE 0.4 MILLIGRAM(S): 0.4 CAPSULE ORAL at 21:36

## 2020-01-02 RX ADMIN — CLOPIDOGREL BISULFATE 75 MILLIGRAM(S): 75 TABLET, FILM COATED ORAL at 11:57

## 2020-01-02 RX ADMIN — Medication 9 UNIT(S): at 11:57

## 2020-01-02 RX ADMIN — INSULIN GLARGINE 15 UNIT(S): 100 INJECTION, SOLUTION SUBCUTANEOUS at 21:37

## 2020-01-02 RX ADMIN — Medication 1 GRAM(S): at 23:43

## 2020-01-02 RX ADMIN — Medication 25 MILLIGRAM(S): at 17:08

## 2020-01-02 RX ADMIN — Medication 1 GRAM(S): at 05:46

## 2020-01-02 RX ADMIN — Medication 9 UNIT(S): at 17:07

## 2020-01-02 RX ADMIN — Medication 25 MILLIGRAM(S): at 13:59

## 2020-01-02 RX ADMIN — APIXABAN 5 MILLIGRAM(S): 2.5 TABLET, FILM COATED ORAL at 17:08

## 2020-01-02 RX ADMIN — Medication 1 GRAM(S): at 17:08

## 2020-01-02 RX ADMIN — Medication 5 MILLIGRAM(S): at 05:46

## 2020-01-02 RX ADMIN — INSULIN GLARGINE 15 UNIT(S): 100 INJECTION, SOLUTION SUBCUTANEOUS at 08:09

## 2020-01-02 RX ADMIN — APIXABAN 5 MILLIGRAM(S): 2.5 TABLET, FILM COATED ORAL at 05:50

## 2020-01-02 RX ADMIN — Medication 25 MILLIGRAM(S): at 05:50

## 2020-01-02 RX ADMIN — Medication 9 UNIT(S): at 08:09

## 2020-01-02 RX ADMIN — Medication 0: at 21:35

## 2020-01-02 RX ADMIN — PANTOPRAZOLE SODIUM 40 MILLIGRAM(S): 20 TABLET, DELAYED RELEASE ORAL at 05:46

## 2020-01-02 NOTE — PROGRESS NOTE ADULT - SUBJECTIVE AND OBJECTIVE BOX
Patient is a 49y old  Male who presents with a chief complaint of R sided numbness (02 Jan 2020 08:43)      INTERVAL HPI/OVERNIGHT EVENTS: Patient seen and examined. NAD. No complaints.    Vital Signs Last 24 Hrs  T(C): 36.9 (02 Jan 2020 07:45), Max: 36.9 (02 Jan 2020 00:04)  T(F): 98.4 (02 Jan 2020 07:45), Max: 98.4 (02 Jan 2020 00:04)  HR: 59 (02 Jan 2020 07:45) (51 - 65)  BP: 133/78 (02 Jan 2020 07:45) (109/67 - 133/78)  BP(mean): --  RR: 18 (02 Jan 2020 07:45) (17 - 18)  SpO2: 97% (02 Jan 2020 07:45) (95% - 100%)              CAPILLARY BLOOD GLUCOSE      POCT Blood Glucose.: 89 mg/dL (02 Jan 2020 07:51)  POCT Blood Glucose.: 171 mg/dL (01 Jan 2020 21:48)  POCT Blood Glucose.: 180 mg/dL (01 Jan 2020 17:15)  POCT Blood Glucose.: 204 mg/dL (01 Jan 2020 11:50)              acetaminophen   Tablet .. 650 milliGRAM(s) Oral every 6 hours PRN  apixaban 5 milliGRAM(s) Oral every 12 hours  atorvastatin 80 milliGRAM(s) Oral at bedtime  bethanechol 25 milliGRAM(s) Oral three times a day  clopidogrel Tablet 75 milliGRAM(s) Oral daily  dextrose 40% Gel 15 Gram(s) Oral once PRN  dextrose 5%. 1000 milliLiter(s) IV Continuous <Continuous>  dextrose 50% Injectable 12.5 Gram(s) IV Push once  dextrose 50% Injectable 25 Gram(s) IV Push once  dextrose 50% Injectable 25 Gram(s) IV Push once  glucagon  Injectable 1 milliGRAM(s) IntraMuscular once PRN  insulin glargine Injectable (LANTUS) 15 Unit(s) SubCutaneous two times a day  insulin lispro (HumaLOG) corrective regimen sliding scale   SubCutaneous three times a day before meals  insulin lispro (HumaLOG) corrective regimen sliding scale   SubCutaneous at bedtime  insulin lispro Injectable (HumaLOG) 9 Unit(s) SubCutaneous three times a day before meals  isosorbide   mononitrate ER Tablet (IMDUR) 30 milliGRAM(s) Oral daily  losartan 25 milliGRAM(s) Oral daily  methimazole 5 milliGRAM(s) Oral daily  metoprolol tartrate 25 milliGRAM(s) Oral every 12 hours  pantoprazole    Tablet 40 milliGRAM(s) Oral before breakfast  predniSONE   Tablet 5 milliGRAM(s) Oral daily  sucralfate 1 Gram(s) Oral four times a day  tamsulosin 0.4 milliGRAM(s) Oral at bedtime              REVIEW OF SYSTEMS:  CONSTITUTIONAL: No fever, no weight loss, or no fatigue  NECK: No pain, no stiffness  RESPIRATORY: No cough, no wheezing, no chills, no hemoptysis, No shortness of breath  CARDIOVASCULAR: No chest pain, no palpitations, no dizziness, no leg swelling  GASTROINTESTINAL: No abdominal pain. No nausea, no vomiting, no hematemesis; No diarrhea, no constipation. No melena, no hematochezia.  GENITOURINARY: No dysuria, no frequency, no hematuria, no incontinence  NEUROLOGICAL: No headaches, no loss of strength, no numbness, no tremors  SKIN: No itching, no burning  MUSCULOSKELETAL: No joint pain, no swelling; No muscle, no back, no extremity pain  PSYCHIATRIC: No depression, no mood swings,   HEME/LYMPH: No easy bruising, no bleeding gums  ALLERY AND IMMUNOLOGIC: No hives       Consultant(s) Notes Reviewed:  [X] YES  [ ] NO    PHYSICAL EXAM:  GENERAL: NAD  HEAD:  Atraumatic, Normocephalic  EYES: EOMI, PERRLA, conjunctiva and sclera clear  ENMT: No tonsillar erythema, exudates, or enlargement; Moist mucous membranes  NECK: Supple, No JVD  NERVOUS SYSTEM:  Awake & alert  CHEST/LUNG: Clear to auscultation bilaterally; No rales, rhonchi, wheezing,  HEART: Regular rate and rhythm  ABDOMEN: Soft, Nontender, Nondistended; Bowel sounds present  EXTREMITIES:  No clubbing, cyanosis, or edema  LYMPH: No lymphadenopathy noted  SKIN: No rashes      Advanced care planning discussed with patient/family [X] YES   [ ] NO    Advanced care planning discussed with patient/family. Advanced care planning forms reviewed/discussed/completed. 20 minutes spent.

## 2020-01-02 NOTE — PROGRESS NOTE ADULT - ASSESSMENT
50 yo male with PMHx significant of CAD s/p BMS to RCA (8/2019), RUL subsegmental PE (6/2019, on Eliquis), hx of NSTEMI and PAF s/p ex-lap omentopexy and plication for perforated antral ulcer (5/2019), osteomyelitis s/p debridement, CVA s/p tPA (8/2019), HTN and DM2 (not on insulin) presenting to the ED with right sided weakness, facial droop, and slurred speech.    Hx CVA  - MR head negative.   - EEG unremarkable   - Continue Plavix and high-intensity statin  - Neuro following    Afib  -has maintained NSR   - Amiodarone was previously discontinued as it may have been causing his thyroid issues.  - Continue with Lopressor BID with hold parameters  - Continue with ELiquis BID  - Echo 12/24 as delineated above revealing severe LVH, normal systolic function ef 65% grade 2 diastolic dysfunction, mild MR, trace TR       CAD s/p PCI  - No symptoms suggestive of ischemia  - Continue Plavix 75 QD  - Continue Statin  - Continue Imdur  - Continue ARB and beta blocker     Awaiting D/C to TORIN Alvarez FNP-C  Cardiology NP  [id number] [phone]

## 2020-01-02 NOTE — PROGRESS NOTE ADULT - SUBJECTIVE AND OBJECTIVE BOX
[facility] -- Bolivar Carbajal, El, Brittany, Reynaldo Sweeney Savella  Office # [phone]      Follow Up:  cad    Subjective/Observations:     No events overnight resting comfortably in bed.  No complaints of chest pain, dyspnea, or palpitations reported. No signs of orthopnea or PND.  awaiting discharge     REVIEW OF SYSTEMS: All other review of systems is negative unless indicated above    PAST MEDICAL & SURGICAL HISTORY:  Cerebrovascular accident  CAD S/P percutaneous coronary angioplasty  Osteomyelitis: s/p debridement  History of non-ST elevation myocardial infarction (NSTEMI)  Pulmonary embolism  Perforated gastric ulcer: s/p emergent ex-lap omentopexy and plication 6/2019  BPH (benign prostatic hyperplasia)  Hypertension  Afib  Diabetes mellitus with no complication  Diabetes  Traumatic amputation of left foot, initial encounter  Perforated gastric ulcer  H/O abdominal surgery      MEDICATIONS  (STANDING):  apixaban 5 milliGRAM(s) Oral every 12 hours  atorvastatin 80 milliGRAM(s) Oral at bedtime  bethanechol 25 milliGRAM(s) Oral three times a day  clopidogrel Tablet 75 milliGRAM(s) Oral daily  dextrose 5%. 1000 milliLiter(s) (50 mL/Hr) IV Continuous <Continuous>  dextrose 50% Injectable 12.5 Gram(s) IV Push once  dextrose 50% Injectable 25 Gram(s) IV Push once  dextrose 50% Injectable 25 Gram(s) IV Push once  insulin glargine Injectable (LANTUS) 15 Unit(s) SubCutaneous two times a day  insulin lispro (HumaLOG) corrective regimen sliding scale   SubCutaneous three times a day before meals  insulin lispro (HumaLOG) corrective regimen sliding scale   SubCutaneous at bedtime  insulin lispro Injectable (HumaLOG) 9 Unit(s) SubCutaneous three times a day before meals  isosorbide   mononitrate ER Tablet (IMDUR) 30 milliGRAM(s) Oral daily  losartan 25 milliGRAM(s) Oral daily  methimazole 5 milliGRAM(s) Oral daily  metoprolol tartrate 25 milliGRAM(s) Oral every 12 hours  pantoprazole    Tablet 40 milliGRAM(s) Oral before breakfast  predniSONE   Tablet 5 milliGRAM(s) Oral daily  sucralfate 1 Gram(s) Oral four times a day  tamsulosin 0.4 milliGRAM(s) Oral at bedtime    MEDICATIONS  (PRN):  acetaminophen   Tablet .. 650 milliGRAM(s) Oral every 6 hours PRN Temp greater or equal to 38C (100.4F), Mild Pain (1 - 3)  dextrose 40% Gel 15 Gram(s) Oral once PRN Blood Glucose LESS THAN 70 milliGRAM(s)/deciLiter  glucagon  Injectable 1 milliGRAM(s) IntraMuscular once PRN Glucose <70 milliGRAM(s)/deciLiter      Allergies    fish (Hives)  No Known Drug Allergies    Intolerances        Vital Signs Last 24 Hrs  T(C): 36.9 (02 Jan 2020 07:45), Max: 36.9 (02 Jan 2020 00:04)  T(F): 98.4 (02 Jan 2020 07:45), Max: 98.4 (02 Jan 2020 00:04)  HR: 59 (02 Jan 2020 07:45) (51 - 65)  BP: 133/78 (02 Jan 2020 07:45) (109/67 - 133/78)  BP(mean): --  RR: 18 (02 Jan 2020 07:45) (17 - 18)  SpO2: 97% (02 Jan 2020 07:45) (95% - 100%)    I&O's Summary    01 Jan 2020 07:01  -  02 Jan 2020 07:00  --------------------------------------------------------  IN: 0 mL / OUT: 2300 mL / NET: -2300 mL          PHYSICAL EXAM:  TELE: not on tele   Constitutional: NAD, awake and alert, well-developed  HEENT: Moist Mucous Membranes, Anicteric  Pulmonary: Non-labored, breath sounds are clear bilaterally, No wheezing, crackles or rhonchi  Cardiovascular: Regular, S1 and S2 nl, No murmurs, rubs, gallops or clicks  Gastrointestinal: Bowel Sounds present, soft, nontender.   Lymph: No lymphadenopathy. No peripheral edema.  Skin: No visible rashes or ulcers.  Psych:  Mood & affect appropriate    LABS: All Labs Reviewed:                   ECG:  < from: 12 Lead ECG (12.23.19 @ 15:36) >    Ventricular Rate 86 BPM    Atrial Rate 86 BPM    P-R Interval 152 ms    QRS Duration 86 ms    Q-T Interval 370 ms    QTC Calculation(Bezet) 442 ms    P Axis 35 degrees    R Axis 6 degrees    T Axis -16 degrees    Diagnosis Line Normal sinus rhythm  Nonspecific ST abnormality    < end of copied text >      Echo:    < from: TTE Echo Doppler w/o Cont (12.24.19 @ 13:55) >  Severe concentric LVH, and mild LV enlargement, with normal systolic function, estimated LVEF of 65%. Normal right ventricular size and systolic function. Grade 2diastolic dysfunction. Left atrial enlargement The aortic root is normal in size. The mitral valve is structurally normal, mild MR. The aortic valve is trileaflet without stenosis or insufficiency. Trace physiologic TR. Inadequate TR jet to estimate PA systolic pressure No significant pericardial effusion.    < end of copied text >      Radiology:    < from: Xray Chest 1 View AP/PA (12.23.19 @ 16:16) >  Cardiac monitor leads present    Borderline cardiomegaly. No sign of lobar consolidation vascular congestion or effusion. Hilar regions, mediastinal contours intact. No acute osseous abnormalities.    IMPRESSION: See above report    < end of copied text >

## 2020-01-02 NOTE — PROGRESS NOTE ADULT - PROBLEM SELECTOR PLAN 1
cont lantus 15 units 2x/day   cont humalog 9 units 3x/day before meals  cont mod dose humalog scale coverage  cont cons cho diet  goal bg 100-180 in hosp setting

## 2020-01-02 NOTE — PROGRESS NOTE ADULT - SUBJECTIVE AND OBJECTIVE BOX
CAPILLARY BLOOD GLUCOSE      POCT Blood Glucose.: 89 mg/dL (02 Jan 2020 07:51)  POCT Blood Glucose.: 171 mg/dL (01 Jan 2020 21:48)  POCT Blood Glucose.: 180 mg/dL (01 Jan 2020 17:15)  POCT Blood Glucose.: 204 mg/dL (01 Jan 2020 11:50)      Vital Signs Last 24 Hrs  T(C): 36.9 (02 Jan 2020 07:45), Max: 36.9 (02 Jan 2020 00:04)  T(F): 98.4 (02 Jan 2020 07:45), Max: 98.4 (02 Jan 2020 00:04)  HR: 59 (02 Jan 2020 07:45) (51 - 65)  BP: 133/78 (02 Jan 2020 07:45) (109/67 - 133/78)  BP(mean): --  RR: 18 (02 Jan 2020 07:45) (17 - 18)  SpO2: 97% (02 Jan 2020 07:45) (95% - 100%)    General: WN/WD NAD  Respiratory: CTA B/L  CV: RRR, S1S2, no murmurs, rubs or gallops  Abdominal: Soft, NT, ND +BS, Last BM  Extremities: No edema, + peripheral pulses             atorvastatin 80 milliGRAM(s) Oral at bedtime  dextrose 40% Gel 15 Gram(s) Oral once PRN  dextrose 50% Injectable 12.5 Gram(s) IV Push once  dextrose 50% Injectable 25 Gram(s) IV Push once  dextrose 50% Injectable 25 Gram(s) IV Push once  glucagon  Injectable 1 milliGRAM(s) IntraMuscular once PRN  insulin glargine Injectable (LANTUS) 15 Unit(s) SubCutaneous two times a day  insulin lispro (HumaLOG) corrective regimen sliding scale   SubCutaneous three times a day before meals  insulin lispro (HumaLOG) corrective regimen sliding scale   SubCutaneous at bedtime  insulin lispro Injectable (HumaLOG) 9 Unit(s) SubCutaneous three times a day before meals  methimazole 5 milliGRAM(s) Oral daily  predniSONE   Tablet 5 milliGRAM(s) Oral daily

## 2020-01-02 NOTE — PROGRESS NOTE ADULT - PROBLEM SELECTOR PLAN 1
Patient admitted today that he is faking symptoms to get into SNF  No evidence of CVA on MRI  Malingering vs Factitious vs Adjustment d/o  Psych f/u  Continue eliquis and plavix  physical therapy  neuro f/u  Further work-up/management pending clinical course. No evidence of CVA on MRI  Continue eliquis and plavix  physical therapy  neuro f/u  Further work-up/management pending clinical course.

## 2020-01-03 ENCOUNTER — RESULT REVIEW (OUTPATIENT)
Age: 50
End: 2020-01-03

## 2020-01-03 DIAGNOSIS — L60.1 ONYCHOLYSIS: ICD-10-CM

## 2020-01-03 PROCEDURE — 88304 TISSUE EXAM BY PATHOLOGIST: CPT | Mod: 26

## 2020-01-03 PROCEDURE — 88312 SPECIAL STAINS GROUP 1: CPT | Mod: 26

## 2020-01-03 PROCEDURE — 99232 SBSQ HOSP IP/OBS MODERATE 35: CPT

## 2020-01-03 PROCEDURE — 11730 AVULSION NAIL PLATE SIMPLE 1: CPT

## 2020-01-03 RX ORDER — INSULIN LISPRO 100/ML
5 VIAL (ML) SUBCUTANEOUS
Qty: 0 | Refills: 0 | DISCHARGE
Start: 2020-01-03

## 2020-01-03 RX ORDER — INSULIN LISPRO 100/ML
7 VIAL (ML) SUBCUTANEOUS
Qty: 0 | Refills: 0 | DISCHARGE
Start: 2020-01-03

## 2020-01-03 RX ORDER — INSULIN LISPRO 100/ML
9 VIAL (ML) SUBCUTANEOUS
Qty: 0 | Refills: 0 | DISCHARGE
Start: 2020-01-03

## 2020-01-03 RX ADMIN — PANTOPRAZOLE SODIUM 40 MILLIGRAM(S): 20 TABLET, DELAYED RELEASE ORAL at 05:51

## 2020-01-03 RX ADMIN — Medication 9 UNIT(S): at 08:12

## 2020-01-03 RX ADMIN — INSULIN GLARGINE 15 UNIT(S): 100 INJECTION, SOLUTION SUBCUTANEOUS at 22:12

## 2020-01-03 RX ADMIN — APIXABAN 5 MILLIGRAM(S): 2.5 TABLET, FILM COATED ORAL at 17:10

## 2020-01-03 RX ADMIN — Medication 5 MILLIGRAM(S): at 05:51

## 2020-01-03 RX ADMIN — Medication 25 MILLIGRAM(S): at 05:51

## 2020-01-03 RX ADMIN — APIXABAN 5 MILLIGRAM(S): 2.5 TABLET, FILM COATED ORAL at 05:51

## 2020-01-03 RX ADMIN — Medication 2: at 22:13

## 2020-01-03 RX ADMIN — Medication 9 UNIT(S): at 12:03

## 2020-01-03 RX ADMIN — Medication 9 UNIT(S): at 17:07

## 2020-01-03 RX ADMIN — Medication 25 MILLIGRAM(S): at 22:11

## 2020-01-03 RX ADMIN — LOSARTAN POTASSIUM 25 MILLIGRAM(S): 100 TABLET, FILM COATED ORAL at 05:51

## 2020-01-03 RX ADMIN — TAMSULOSIN HYDROCHLORIDE 0.4 MILLIGRAM(S): 0.4 CAPSULE ORAL at 22:11

## 2020-01-03 RX ADMIN — CLOPIDOGREL BISULFATE 75 MILLIGRAM(S): 75 TABLET, FILM COATED ORAL at 12:02

## 2020-01-03 RX ADMIN — INSULIN GLARGINE 15 UNIT(S): 100 INJECTION, SOLUTION SUBCUTANEOUS at 08:13

## 2020-01-03 RX ADMIN — Medication 1 GRAM(S): at 12:02

## 2020-01-03 RX ADMIN — ISOSORBIDE MONONITRATE 30 MILLIGRAM(S): 60 TABLET, EXTENDED RELEASE ORAL at 12:02

## 2020-01-03 RX ADMIN — Medication 1 GRAM(S): at 05:51

## 2020-01-03 RX ADMIN — Medication 25 MILLIGRAM(S): at 17:08

## 2020-01-03 RX ADMIN — ATORVASTATIN CALCIUM 80 MILLIGRAM(S): 80 TABLET, FILM COATED ORAL at 22:11

## 2020-01-03 RX ADMIN — Medication 25 MILLIGRAM(S): at 12:02

## 2020-01-03 RX ADMIN — Medication 1 GRAM(S): at 17:09

## 2020-01-03 RX ADMIN — Medication 2: at 12:03

## 2020-01-03 NOTE — BRIEF OPERATIVE NOTE - OPERATION/FINDINGS
Lifting of right hallux toenail with sanguinous drainage around nail plate. No laceration noted to nail bed upon avulsion of nail plate.

## 2020-01-03 NOTE — CONSULT NOTE ADULT - ASSESSMENT
48 yo male with PMHx significant of CAD s/p BMS to RCA (8/2019), RUL subsegmental PE (6/2019, on Eliquis), hx of NSTEMI and PAF s/p ex-lap omentopexy and plication for perforated antral ulcer (5/2019), osteomyelitis s/p debridement, CVA s/p tPA (8/2019), HTN and DM2 (not on insulin) presenting to the ED with right sided weakness, facial droop, and slurred speech.    Neuro symptoms; TIA? s/p CVA  - Neurology evaluation appreciated and in progress  - Watch on telemetry. He does have a history of AF, and may have been off his anticoagulation in the short term  - MRI head is pending    Afib  - in a SR today  - Amiodarone previoulsy discontinued as it may have been causing thyroid issues.  - Continue with lopressor BID with hold parameters  - Continue Eliquis BID if no neuro contraindication  - repeat echocardiogram  - thyroid function testing  - Monitor electrolytes, replete to keep K>4 and Mag>2    CAD s/p PCI  - Continue Plavix 75 QD  - Continue statin  - Continue Imdur  - Continue Losartan  - He has no symptoms suggestive of ischemia    - Will follow with you
Right Hallux Onycholysis 2/2 Trauma

## 2020-01-03 NOTE — PROGRESS NOTE ADULT - PROBLEM SELECTOR PLAN 1
cont lantus 15 units 2x/day   cont humalog 9 units 3x/day  cont humalog scale coverage qac/qhs  cont cons cho diet  goal bg 100-180 in hosp setting

## 2020-01-03 NOTE — PROGRESS NOTE ADULT - SUBJECTIVE AND OBJECTIVE BOX
CAPILLARY BLOOD GLUCOSE      POCT Blood Glucose.: 129 mg/dL (03 Jan 2020 08:02)  POCT Blood Glucose.: 107 mg/dL (02 Jan 2020 21:17)  POCT Blood Glucose.: 153 mg/dL (02 Jan 2020 16:42)  POCT Blood Glucose.: 114 mg/dL (02 Jan 2020 11:43)      Vital Signs Last 24 Hrs  T(C): 36.8 (03 Jan 2020 07:50), Max: 36.9 (02 Jan 2020 20:00)  T(F): 98.2 (03 Jan 2020 07:50), Max: 98.4 (02 Jan 2020 20:00)  HR: 51 (03 Jan 2020 07:50) (51 - 66)  BP: 122/73 (03 Jan 2020 07:50) (110/72 - 133/81)  BP(mean): --  RR: 17 (03 Jan 2020 07:50) (17 - 18)  SpO2: 94% (03 Jan 2020 07:50) (94% - 99%)    General: WN/WD NAD  Respiratory: CTA B/L  CV: RRR, S1S2, no murmurs, rubs or gallops  Abdominal: Soft, NT, ND +BS, Last BM  Extremities: No edema, + peripheral pulses             atorvastatin 80 milliGRAM(s) Oral at bedtime  dextrose 40% Gel 15 Gram(s) Oral once PRN  dextrose 50% Injectable 12.5 Gram(s) IV Push once  dextrose 50% Injectable 25 Gram(s) IV Push once  dextrose 50% Injectable 25 Gram(s) IV Push once  glucagon  Injectable 1 milliGRAM(s) IntraMuscular once PRN  insulin glargine Injectable (LANTUS) 15 Unit(s) SubCutaneous two times a day  insulin lispro (HumaLOG) corrective regimen sliding scale   SubCutaneous three times a day before meals  insulin lispro (HumaLOG) corrective regimen sliding scale   SubCutaneous at bedtime  insulin lispro Injectable (HumaLOG) 9 Unit(s) SubCutaneous three times a day before meals  methimazole 5 milliGRAM(s) Oral daily  predniSONE   Tablet 5 milliGRAM(s) Oral daily

## 2020-01-03 NOTE — PROGRESS NOTE ADULT - PROBLEM SELECTOR PLAN 1
Patient can tolerate at least 3 hours of physical therapy a day, 5-7 days a week  Continue eliquis and plavix  neuro f/u  Further work-up/management pending clinical course.

## 2020-01-03 NOTE — PROGRESS NOTE ADULT - ASSESSMENT
50 yo male with PMHx significant of CAD s/p BMS to RCA (8/2019), RUL subsegmental PE (6/2019, on Eliquis), hx of NSTEMI and PAF s/p ex-lap omentopexy and plication for perforated antral ulcer (5/2019), osteomyelitis s/p debridement, CVA s/p tPA (8/2019), HTN and DM2 (not on insulin) presenting to the ED with right sided weakness, facial droop, and slurred speech.    Hx CVA  - MR head negative.   - EEG unremarkable   - Continue Plavix and high-intensity statin  - Neuro following    Afib  -has maintained NSR   - Amiodarone was previously discontinued as it may have been causing his thyroid issues.  - Continue with Lopressor BID with hold parameters  - Continue with ELiquis BID  - Echo 12/24 as delineated above revealing severe LVH, normal systolic function ef 65% grade 2 diastolic dysfunction, mild MR, trace TR     CAD s/p PCI  - No symptoms suggestive of ischemia  - Continue Plavix 75 QD  - Continue Statin  - Continue Imdur  - Continue ARB and beta blocker     Awaiting D/C to ELIAS Alvarez FNP-C  Cardiology NP  [facility] [phone]

## 2020-01-03 NOTE — PROCEDURAL SAFETY CHECKLIST WITH OR WITHOUT SEDATION - [id number]_GEN_ALL_CORE
Psychological condition is unchanged.  Continue current treatment regimen.  Regular aerobic exercise.  Psychological condition will be reassessed in 3 months     Pt would like printed prescritions for Adderall.  Printed Rx given to pt; to keep in safe place as printed Rx cannot be replaced  F/U in office in 3 months.    done

## 2020-01-03 NOTE — CONSULT NOTE ADULT - SUBJECTIVE AND OBJECTIVE BOX
HPI:  49y year old Male seen at hospitals 1EAS 114 D1 for right great toenail trauma. Patient relates that he does not have any feeling in his feet and states that he believes he may have stubbed his right big toe.    Denies any fever, chills, nausea, vomiting, chest pain, shortness of breath, or calf pain at this time.      ----------REVIEW OF SYSTEMS----------      PAST MEDICAL & SURGICAL HISTORY:  Cerebrovascular accident  CAD S/P percutaneous coronary angioplasty  Osteomyelitis: s/p debridement  History of non-ST elevation myocardial infarction (NSTEMI)  Pulmonary embolism  Perforated gastric ulcer: s/p emergent ex-lap omentopexy and plication 6/2019  BPH (benign prostatic hyperplasia)  Hypertension  Afib  Diabetes mellitus with no complication  Diabetes  Traumatic amputation of left foot, initial encounter  Perforated gastric ulcer  H/O abdominal surgery      Allergies    fish (Hives)  No Known Drug Allergies    Intolerances        MEDICATIONS  (STANDING):  apixaban 5 milliGRAM(s) Oral every 12 hours  atorvastatin 80 milliGRAM(s) Oral at bedtime  bethanechol 25 milliGRAM(s) Oral three times a day  clopidogrel Tablet 75 milliGRAM(s) Oral daily  dextrose 5%. 1000 milliLiter(s) (50 mL/Hr) IV Continuous <Continuous>  dextrose 50% Injectable 12.5 Gram(s) IV Push once  dextrose 50% Injectable 25 Gram(s) IV Push once  dextrose 50% Injectable 25 Gram(s) IV Push once  insulin glargine Injectable (LANTUS) 15 Unit(s) SubCutaneous two times a day  insulin lispro (HumaLOG) corrective regimen sliding scale   SubCutaneous three times a day before meals  insulin lispro (HumaLOG) corrective regimen sliding scale   SubCutaneous at bedtime  insulin lispro Injectable (HumaLOG) 9 Unit(s) SubCutaneous three times a day before meals  isosorbide   mononitrate ER Tablet (IMDUR) 30 milliGRAM(s) Oral daily  losartan 25 milliGRAM(s) Oral daily  methimazole 5 milliGRAM(s) Oral daily  metoprolol tartrate 25 milliGRAM(s) Oral every 12 hours  pantoprazole    Tablet 40 milliGRAM(s) Oral before breakfast  predniSONE   Tablet 5 milliGRAM(s) Oral daily  sucralfate 1 Gram(s) Oral four times a day  tamsulosin 0.4 milliGRAM(s) Oral at bedtime    MEDICATIONS  (PRN):  acetaminophen   Tablet .. 650 milliGRAM(s) Oral every 6 hours PRN Temp greater or equal to 38C (100.4F), Mild Pain (1 - 3)  dextrose 40% Gel 15 Gram(s) Oral once PRN Blood Glucose LESS THAN 70 milliGRAM(s)/deciLiter  glucagon  Injectable 1 milliGRAM(s) IntraMuscular once PRN Glucose <70 milliGRAM(s)/deciLiter        Social History:      FAMILY HISTORY:  FH: stroke: Father  FH: coronary artery disease: Father  FH: pulmonary embolism: Mother          Vital Signs Last 24 Hrs  T(C): 37 (03 Jan 2020 13:07), Max: 37 (03 Jan 2020 13:07)  T(F): 98.6 (03 Jan 2020 13:07), Max: 98.6 (03 Jan 2020 13:07)  HR: 58 (03 Jan 2020 13:07) (51 - 62)  BP: 139/81 (03 Jan 2020 13:07) (110/72 - 149/75)  BP(mean): --  RR: 16 (03 Jan 2020 13:07) (16 - 19)  SpO2: 94% (03 Jan 2020 13:07) (94% - 98%)      PHYSICAL EXAM:  Vascular: DP & PT palpable bilaterally, Capillary refill 3 seconds, Digital hair present bilaterally  Neurological: Light touch sensation intact bilaterally  Musculoskeletal: 5/5 strength in all quadrants bilaterally, AJ & STJ ROM intact  Dermatological: ---------- cm ulceration noted to the ----------, granular wound bed, no probe to bone, no periwound erythema, no fluctuance, no malodor, no proximal streaking at this time          ----------CHEM PANEL----------              Imaging: ---------- HPI:  49y year old Male seen at [facility] 1EAS 114 D1 for right great toenail trauma. Patient relates that he does not have any feeling in his feet and states that he believes he may have stubbed his right big toe. Patient states that he noticed bleeding from around his right big toenail today. Denies any pain at this time. Denies any fever, chills, nausea, vomiting, chest pain, shortness of breath, or calf pain at this time.    PAST MEDICAL & SURGICAL HISTORY:  Cerebrovascular accident  CAD S/P percutaneous coronary angioplasty  Osteomyelitis: s/p debridement  History of non-ST elevation myocardial infarction (NSTEMI)  Pulmonary embolism  Perforated gastric ulcer: s/p emergent ex-lap omentopexy and plication 6/2019  BPH (benign prostatic hyperplasia)  Hypertension  Afib  Diabetes mellitus with no complication  Diabetes  Traumatic amputation of left foot, initial encounter  Perforated gastric ulcer  H/O abdominal surgery    Allergies  fish (Hives)  No Known Drug Allergies    MEDICATIONS  (STANDING):  apixaban 5 milliGRAM(s) Oral every 12 hours  atorvastatin 80 milliGRAM(s) Oral at bedtime  bethanechol 25 milliGRAM(s) Oral three times a day  clopidogrel Tablet 75 milliGRAM(s) Oral daily  dextrose 5%. 1000 milliLiter(s) (50 mL/Hr) IV Continuous <Continuous>  dextrose 50% Injectable 12.5 Gram(s) IV Push once  dextrose 50% Injectable 25 Gram(s) IV Push once  dextrose 50% Injectable 25 Gram(s) IV Push once  insulin glargine Injectable (LANTUS) 15 Unit(s) SubCutaneous two times a day  insulin lispro (HumaLOG) corrective regimen sliding scale   SubCutaneous three times a day before meals  insulin lispro (HumaLOG) corrective regimen sliding scale   SubCutaneous at bedtime  insulin lispro Injectable (HumaLOG) 9 Unit(s) SubCutaneous three times a day before meals  isosorbide   mononitrate ER Tablet (IMDUR) 30 milliGRAM(s) Oral daily  losartan 25 milliGRAM(s) Oral daily  methimazole 5 milliGRAM(s) Oral daily  metoprolol tartrate 25 milliGRAM(s) Oral every 12 hours  pantoprazole    Tablet 40 milliGRAM(s) Oral before breakfast  predniSONE   Tablet 5 milliGRAM(s) Oral daily  sucralfate 1 Gram(s) Oral four times a day  tamsulosin 0.4 milliGRAM(s) Oral at bedtime    MEDICATIONS  (PRN):  acetaminophen   Tablet .. 650 milliGRAM(s) Oral every 6 hours PRN Temp greater or equal to 38C (100.4F), Mild Pain (1 - 3)  dextrose 40% Gel 15 Gram(s) Oral once PRN Blood Glucose LESS THAN 70 milliGRAM(s)/deciLiter  glucagon  Injectable 1 milliGRAM(s) IntraMuscular once PRN Glucose <70 milliGRAM(s)/deciLiter    FAMILY HISTORY:  FH: stroke: Father  FH: coronary artery disease: Father  FH: pulmonary embolism: Mother    Vital Signs Last 24 Hrs  T(C): 37 (03 Jan 2020 13:07), Max: 37 (03 Jan 2020 13:07)  T(F): 98.6 (03 Jan 2020 13:07), Max: 98.6 (03 Jan 2020 13:07)  HR: 58 (03 Jan 2020 13:07) (51 - 62)  BP: 139/81 (03 Jan 2020 13:07) (110/72 - 149/75)  BP(mean): --  RR: 16 (03 Jan 2020 13:07) (16 - 19)  SpO2: 94% (03 Jan 2020 13:07) (94% - 98%)      PHYSICAL EXAM:  Vascular: DP & PT palpable bilaterally, Capillary refill 3 seconds, Digital hair present bilaterally  Neurological: Light touch sensation not intact bilaterally  Musculoskeletal: 5/5 strength in all quadrants bilaterally, AJ & STJ ROM intact, left partial 1st ray amputation  Dermatological: Right hallux nail noted to be  from nail bed at this time with dried blood           ----------CHEM PANEL----------              Imaging: ---------- HPI:  49y year old Male seen at [facility] 1EAS 114 D1 for right great toenail trauma. Patient relates that he does not have any feeling in his feet and states that he believes he may have stubbed his right big toe. Patient states that he noticed bleeding from around his right big toenail today. Denies any pain at this time. Denies any fever, chills, nausea, vomiting, chest pain, shortness of breath, or calf pain at this time.    PAST MEDICAL & SURGICAL HISTORY:  Cerebrovascular accident  CAD S/P percutaneous coronary angioplasty  Osteomyelitis: s/p debridement  History of non-ST elevation myocardial infarction (NSTEMI)  Pulmonary embolism  Perforated gastric ulcer: s/p emergent ex-lap omentopexy and plication 6/2019  BPH (benign prostatic hyperplasia)  Hypertension  Afib  Diabetes mellitus with no complication  Diabetes  Traumatic amputation of left foot, initial encounter  Perforated gastric ulcer  H/O abdominal surgery    Allergies  fish (Hives)  No Known Drug Allergies    MEDICATIONS  (STANDING):  apixaban 5 milliGRAM(s) Oral every 12 hours  atorvastatin 80 milliGRAM(s) Oral at bedtime  bethanechol 25 milliGRAM(s) Oral three times a day  clopidogrel Tablet 75 milliGRAM(s) Oral daily  dextrose 5%. 1000 milliLiter(s) (50 mL/Hr) IV Continuous <Continuous>  dextrose 50% Injectable 12.5 Gram(s) IV Push once  dextrose 50% Injectable 25 Gram(s) IV Push once  dextrose 50% Injectable 25 Gram(s) IV Push once  insulin glargine Injectable (LANTUS) 15 Unit(s) SubCutaneous two times a day  insulin lispro (HumaLOG) corrective regimen sliding scale   SubCutaneous three times a day before meals  insulin lispro (HumaLOG) corrective regimen sliding scale   SubCutaneous at bedtime  insulin lispro Injectable (HumaLOG) 9 Unit(s) SubCutaneous three times a day before meals  isosorbide   mononitrate ER Tablet (IMDUR) 30 milliGRAM(s) Oral daily  losartan 25 milliGRAM(s) Oral daily  methimazole 5 milliGRAM(s) Oral daily  metoprolol tartrate 25 milliGRAM(s) Oral every 12 hours  pantoprazole    Tablet 40 milliGRAM(s) Oral before breakfast  predniSONE   Tablet 5 milliGRAM(s) Oral daily  sucralfate 1 Gram(s) Oral four times a day  tamsulosin 0.4 milliGRAM(s) Oral at bedtime    MEDICATIONS  (PRN):  acetaminophen   Tablet .. 650 milliGRAM(s) Oral every 6 hours PRN Temp greater or equal to 38C (100.4F), Mild Pain (1 - 3)  dextrose 40% Gel 15 Gram(s) Oral once PRN Blood Glucose LESS THAN 70 milliGRAM(s)/deciLiter  glucagon  Injectable 1 milliGRAM(s) IntraMuscular once PRN Glucose <70 milliGRAM(s)/deciLiter    FAMILY HISTORY:  FH: stroke: Father  FH: coronary artery disease: Father  FH: pulmonary embolism: Mother    Vital Signs Last 24 Hrs  T(C): 37 (03 Jan 2020 13:07), Max: 37 (03 Jan 2020 13:07)  T(F): 98.6 (03 Jan 2020 13:07), Max: 98.6 (03 Jan 2020 13:07)  HR: 58 (03 Jan 2020 13:07) (51 - 62)  BP: 139/81 (03 Jan 2020 13:07) (110/72 - 149/75)  BP(mean): --  RR: 16 (03 Jan 2020 13:07) (16 - 19)  SpO2: 94% (03 Jan 2020 13:07) (94% - 98%)      PHYSICAL EXAM:  Vascular: DP & PT palpable bilaterally, Capillary refill 3 seconds, Digital hair present bilaterally  Neurological: Light touch sensation not intact bilaterally  Musculoskeletal: 5/5 strength in all quadrants bilaterally, AJ & STJ ROM intact, left partial 1st ray amputation  Dermatological: Right hallux nail noted to be  from nail bed at this time with sanguinous drainage around           ----------CHEM PANEL----------              Imaging: ---------- HPI:  49y year old Male seen at hospitals 1EAS 114 D1 for right great toenail trauma. Patient relates that he does not have any feeling in his feet and states that he believes he may have stubbed his right big toe. Patient states that he noticed bleeding from around his right big toenail today. Denies any pain at this time. Denies any fever, chills, nausea, vomiting, chest pain, shortness of breath, or calf pain at this time.    PAST MEDICAL & SURGICAL HISTORY:  Cerebrovascular accident  CAD S/P percutaneous coronary angioplasty  Osteomyelitis: s/p debridement  History of non-ST elevation myocardial infarction (NSTEMI)  Pulmonary embolism  Perforated gastric ulcer: s/p emergent ex-lap omentopexy and plication 6/2019  BPH (benign prostatic hyperplasia)  Hypertension  Afib  Diabetes mellitus with no complication  Diabetes  Traumatic amputation of left foot, initial encounter  Perforated gastric ulcer  H/O abdominal surgery    Allergies  fish (Hives)  No Known Drug Allergies    MEDICATIONS  (STANDING):  apixaban 5 milliGRAM(s) Oral every 12 hours  atorvastatin 80 milliGRAM(s) Oral at bedtime  bethanechol 25 milliGRAM(s) Oral three times a day  clopidogrel Tablet 75 milliGRAM(s) Oral daily  dextrose 5%. 1000 milliLiter(s) (50 mL/Hr) IV Continuous <Continuous>  dextrose 50% Injectable 12.5 Gram(s) IV Push once  dextrose 50% Injectable 25 Gram(s) IV Push once  dextrose 50% Injectable 25 Gram(s) IV Push once  insulin glargine Injectable (LANTUS) 15 Unit(s) SubCutaneous two times a day  insulin lispro (HumaLOG) corrective regimen sliding scale   SubCutaneous three times a day before meals  insulin lispro (HumaLOG) corrective regimen sliding scale   SubCutaneous at bedtime  insulin lispro Injectable (HumaLOG) 9 Unit(s) SubCutaneous three times a day before meals  isosorbide   mononitrate ER Tablet (IMDUR) 30 milliGRAM(s) Oral daily  losartan 25 milliGRAM(s) Oral daily  methimazole 5 milliGRAM(s) Oral daily  metoprolol tartrate 25 milliGRAM(s) Oral every 12 hours  pantoprazole    Tablet 40 milliGRAM(s) Oral before breakfast  predniSONE   Tablet 5 milliGRAM(s) Oral daily  sucralfate 1 Gram(s) Oral four times a day  tamsulosin 0.4 milliGRAM(s) Oral at bedtime    MEDICATIONS  (PRN):  acetaminophen   Tablet .. 650 milliGRAM(s) Oral every 6 hours PRN Temp greater or equal to 38C (100.4F), Mild Pain (1 - 3)  dextrose 40% Gel 15 Gram(s) Oral once PRN Blood Glucose LESS THAN 70 milliGRAM(s)/deciLiter  glucagon  Injectable 1 milliGRAM(s) IntraMuscular once PRN Glucose <70 milliGRAM(s)/deciLiter    FAMILY HISTORY:  FH: stroke: Father  FH: coronary artery disease: Father  FH: pulmonary embolism: Mother    Vital Signs Last 24 Hrs  T(C): 37 (03 Jan 2020 13:07), Max: 37 (03 Jan 2020 13:07)  T(F): 98.6 (03 Jan 2020 13:07), Max: 98.6 (03 Jan 2020 13:07)  HR: 58 (03 Jan 2020 13:07) (51 - 62)  BP: 139/81 (03 Jan 2020 13:07) (110/72 - 149/75)  BP(mean): --  RR: 16 (03 Jan 2020 13:07) (16 - 19)  SpO2: 94% (03 Jan 2020 13:07) (94% - 98%)      PHYSICAL EXAM:  Vascular: DP & PT palpable bilaterally, Capillary refill 3 seconds, Digital hair present bilaterally  Neurological: Light touch sensation not intact bilaterally  Musculoskeletal: 5/5 strength in all quadrants bilaterally, AJ & STJ ROM intact, left partial 1st ray amputation  Dermatological: Right hallux nail noted to be  from nail bed at this time with sanguinous drainage around the nail plate, No purulence, no malodor, no proximal streaking, no probe to bone.

## 2020-01-03 NOTE — CONSULT NOTE ADULT - PROBLEM SELECTOR RECOMMENDATION 9
lantus 15 units bid started  change mod dose humalog scale coverage  cont cons cho diet  goal bg 100-180 in hosp setting
Patient examined and evaluated.  Discussed the etiology of symptoms with patient. All questions answered to satisfaction and patient verbalized understanding.  Discussed treatment plan of removing right hallux nail at this time. All risks, benefits, and potential complications with patient.  Right hallux anesthetized with 5cc of 1% lidocaine plain. Right hallux nail plate freed from nail bed and removed at this time without any incident. Nail bed examined and noted to have no lacerations. Nail bed dressed with iodosorb and dry, sterile dressing. Patient tolerated procedure well without any incident.  Discussed post procedure protocol with patient at this time.  Upon discharge, patient is to keep dressing clean, dry, and intact and is to follow up in the wound care clinic within 1 week upon discharge with Dr. Todd or Dr. Hale.

## 2020-01-03 NOTE — PROCEDURAL SAFETY CHECKLIST WITH OR WITHOUT SEDATION - NSPREPROCEDSEDAT_GEN_ALL_CORE
Plan: Location: Face \\nPharmacy: [facility] \\nTreatment: Continue Tretinoin Bleaching Cream nightly\\n\\nPt is here for 6 weeks f/u.\\nPt has been using Tretinoin Bleaching Cream nightly to help even out pigmentation.\\nPt states that she does not see any improvement and has an appointment with Pooja in November for laser treatment.\\nPictures taken and compared today.\\nDiscussed with pt that her pigmentation looks more evened out today than her previous visit.\\nAdvised pt to continue using Tretinoin Bleaching Cream nightly since it is helping.\\nDiscussed with pt that we will f/u after IPL treatment with Pooja.\\n--------\\nPatient presents with melasma on the face \\nPatient states she has suffered with melasma since she was a teenager \\nShe mentions she has tried numerous OTC cream that help lighten up the skin with no improvement \\nPatient also mentions she was prescribed with a compounded medication by Dr. Reynaga @ the [facility] location ( Hydroquinone 10%, Kojic Acid 6%, Vitamin E 1%\\nPatient mentions after using topical medication she has noticed her skin become more darker \\n\\nDiscussed with patient that melasma is caused by sun damage to the skin and hormones also does play a major role \\n---- Patient mentions when she became pregnant with her son (is now 8 months) she noticed her skin become darker \\nDiscussed with patient that melasma is something that can be controlled by using a SPF on a daily basis and using the right topical medication to help treat melasma \\n\\nPLAN: \\nToday will be prescribing with a different compounded medication...\\n1. Compounded cream 4% Hydroquinone, 0.025% Tretinoin, 1% Hydrocortisone, 3% Kojic Acid \\nExplained to patient that compounded medication will help her skin back to its base color \\nDiscussed with patient to apply a pea size amount to face, pushing into pores. \\nDiscussed with patient to avoid areas around eyes, nasal crease, and around the lips since skin is thin and may get irritated easily.\\n\\nDiscussed with patient that it will come in a box that says keep refrigerated.\\nDiscussed with patient that they do not need to keep it refrigerated, but have to keep it in a dark place since sunlight may oxidize it.\\nDiscussed with patient  that if skin becomes irritated while using cream, then pt can change frequency to every other night, or every other other night, etc.\\nDiscussed with pt that results are not immediate and may take up to a month to notice change.\\nAdvised patient to apply a moisturizing cream such as Cerave afterwards to help reestablish a healthy skin barrier.\\n----- PHOTOS were taken to compare at next office visit \\nTask message was also sent to Rosemary to contact her in regards to possible laser procedure to help melasma \\n\\nFollow up: 6 weeks Detail Level: Zone Plan: Location: scalp\\n\\nDiscussed with patient that the lesion is a benign sac filled with keratinized debris.\\nDiscussed with patient if they find it painful or bothersome, can excise.\\nF/u as needed. without sedation

## 2020-01-03 NOTE — PROGRESS NOTE ADULT - SUBJECTIVE AND OBJECTIVE BOX
Patient is a 49y old  Male who presents with a chief complaint of R sided numbness (03 Jan 2020 09:16)      INTERVAL HPI/OVERNIGHT EVENTS: Patient seen and examined. NAD. No complaints.    Vital Signs Last 24 Hrs  T(C): 36.6 (03 Jan 2020 08:33), Max: 36.9 (02 Jan 2020 20:00)  T(F): 97.9 (03 Jan 2020 08:33), Max: 98.4 (02 Jan 2020 20:00)  HR: 53 (03 Jan 2020 08:33) (51 - 66)  BP: 149/75 (03 Jan 2020 08:33) (110/72 - 149/75)  BP(mean): --  RR: 19 (03 Jan 2020 08:33) (17 - 19)  SpO2: 94% (03 Jan 2020 08:33) (94% - 99%)              CAPILLARY BLOOD GLUCOSE      POCT Blood Glucose.: 129 mg/dL (03 Jan 2020 08:02)  POCT Blood Glucose.: 107 mg/dL (02 Jan 2020 21:17)  POCT Blood Glucose.: 153 mg/dL (02 Jan 2020 16:42)  POCT Blood Glucose.: 114 mg/dL (02 Jan 2020 11:43)              acetaminophen   Tablet .. 650 milliGRAM(s) Oral every 6 hours PRN  apixaban 5 milliGRAM(s) Oral every 12 hours  atorvastatin 80 milliGRAM(s) Oral at bedtime  bethanechol 25 milliGRAM(s) Oral three times a day  clopidogrel Tablet 75 milliGRAM(s) Oral daily  dextrose 40% Gel 15 Gram(s) Oral once PRN  dextrose 5%. 1000 milliLiter(s) IV Continuous <Continuous>  dextrose 50% Injectable 12.5 Gram(s) IV Push once  dextrose 50% Injectable 25 Gram(s) IV Push once  dextrose 50% Injectable 25 Gram(s) IV Push once  glucagon  Injectable 1 milliGRAM(s) IntraMuscular once PRN  insulin glargine Injectable (LANTUS) 15 Unit(s) SubCutaneous two times a day  insulin lispro (HumaLOG) corrective regimen sliding scale   SubCutaneous three times a day before meals  insulin lispro (HumaLOG) corrective regimen sliding scale   SubCutaneous at bedtime  insulin lispro Injectable (HumaLOG) 9 Unit(s) SubCutaneous three times a day before meals  isosorbide   mononitrate ER Tablet (IMDUR) 30 milliGRAM(s) Oral daily  losartan 25 milliGRAM(s) Oral daily  methimazole 5 milliGRAM(s) Oral daily  metoprolol tartrate 25 milliGRAM(s) Oral every 12 hours  pantoprazole    Tablet 40 milliGRAM(s) Oral before breakfast  predniSONE   Tablet 5 milliGRAM(s) Oral daily  sucralfate 1 Gram(s) Oral four times a day  tamsulosin 0.4 milliGRAM(s) Oral at bedtime              REVIEW OF SYSTEMS:  CONSTITUTIONAL: No fever, no weight loss, or no fatigue  NECK: No pain, no stiffness  RESPIRATORY: No cough, no wheezing, no chills, no hemoptysis, No shortness of breath  CARDIOVASCULAR: No chest pain, no palpitations, no dizziness, no leg swelling  GASTROINTESTINAL: No abdominal pain. No nausea, no vomiting, no hematemesis; No diarrhea, no constipation. No melena, no hematochezia.  GENITOURINARY: No dysuria, no frequency, no hematuria, no incontinence  NEUROLOGICAL: No headaches, no loss of strength, no numbness, no tremors x right sided weakness  SKIN: No itching, no burning  MUSCULOSKELETAL: No joint pain, no swelling; No muscle, no back, no extremity pain  PSYCHIATRIC: No depression, no mood swings,   HEME/LYMPH: No easy bruising, no bleeding gums  ALLERY AND IMMUNOLOGIC: No hives       Consultant(s) Notes Reviewed:  [X] YES  [ ] NO    PHYSICAL EXAM:  GENERAL: NAD  HEAD:  Atraumatic, Normocephalic  EYES: EOMI, PERRLA, conjunctiva and sclera clear  ENMT: No tonsillar erythema, exudates, or enlargement; Moist mucous membranes  NECK: Supple, No JVD  NERVOUS SYSTEM:  Awake & alert, right sided weakness  CHEST/LUNG: Clear to auscultation bilaterally; No rales, rhonchi, wheezing,  HEART: Regular rate and rhythm  ABDOMEN: Soft, Nontender, Nondistended; Bowel sounds present  EXTREMITIES:  No clubbing, cyanosis, or edema  LYMPH: No lymphadenopathy noted  SKIN: No rashes      Advanced care planning discussed with patient/family [X] YES   [ ] NO    Advanced care planning discussed with patient/family. Advanced care planning forms reviewed/discussed/completed. 20 minutes spent.

## 2020-01-03 NOTE — PROGRESS NOTE ADULT - SUBJECTIVE AND OBJECTIVE BOX
[facility] -- Bolviar Carbajal, El, Brittany, Reynaldo Sweeney Savella  Office # [phone]      Follow Up:    CAD    Subjective/Observations:   No events overnight resting comfortably in bed.  No complaints of chest pain, dyspnea, or palpitations reported. No signs of orthopnea or PND.      REVIEW OF SYSTEMS: All other review of systems is negative unless indicated above    PAST MEDICAL & SURGICAL HISTORY:  Cerebrovascular accident  CAD S/P percutaneous coronary angioplasty  Osteomyelitis: s/p debridement  History of non-ST elevation myocardial infarction (NSTEMI)  Pulmonary embolism  Perforated gastric ulcer: s/p emergent ex-lap omentopexy and plication 6/2019  BPH (benign prostatic hyperplasia)  Hypertension  Afib  Diabetes mellitus with no complication  Diabetes  Traumatic amputation of left foot, initial encounter  Perforated gastric ulcer  H/O abdominal surgery      MEDICATIONS  (STANDING):  apixaban 5 milliGRAM(s) Oral every 12 hours  atorvastatin 80 milliGRAM(s) Oral at bedtime  bethanechol 25 milliGRAM(s) Oral three times a day  clopidogrel Tablet 75 milliGRAM(s) Oral daily  dextrose 5%. 1000 milliLiter(s) (50 mL/Hr) IV Continuous <Continuous>  dextrose 50% Injectable 12.5 Gram(s) IV Push once  dextrose 50% Injectable 25 Gram(s) IV Push once  dextrose 50% Injectable 25 Gram(s) IV Push once  insulin glargine Injectable (LANTUS) 15 Unit(s) SubCutaneous two times a day  insulin lispro (HumaLOG) corrective regimen sliding scale   SubCutaneous three times a day before meals  insulin lispro (HumaLOG) corrective regimen sliding scale   SubCutaneous at bedtime  insulin lispro Injectable (HumaLOG) 9 Unit(s) SubCutaneous three times a day before meals  isosorbide   mononitrate ER Tablet (IMDUR) 30 milliGRAM(s) Oral daily  losartan 25 milliGRAM(s) Oral daily  methimazole 5 milliGRAM(s) Oral daily  metoprolol tartrate 25 milliGRAM(s) Oral every 12 hours  pantoprazole    Tablet 40 milliGRAM(s) Oral before breakfast  predniSONE   Tablet 5 milliGRAM(s) Oral daily  sucralfate 1 Gram(s) Oral four times a day  tamsulosin 0.4 milliGRAM(s) Oral at bedtime    MEDICATIONS  (PRN):  acetaminophen   Tablet .. 650 milliGRAM(s) Oral every 6 hours PRN Temp greater or equal to 38C (100.4F), Mild Pain (1 - 3)  dextrose 40% Gel 15 Gram(s) Oral once PRN Blood Glucose LESS THAN 70 milliGRAM(s)/deciLiter  glucagon  Injectable 1 milliGRAM(s) IntraMuscular once PRN Glucose <70 milliGRAM(s)/deciLiter      Allergies    fish (Hives)  No Known Drug Allergies    Intolerances        Vital Signs Last 24 Hrs  T(C): 36.6 (03 Jan 2020 08:33), Max: 36.9 (02 Jan 2020 20:00)  T(F): 97.9 (03 Jan 2020 08:33), Max: 98.4 (02 Jan 2020 20:00)  HR: 53 (03 Jan 2020 08:33) (51 - 66)  BP: 149/75 (03 Jan 2020 08:33) (110/72 - 149/75)  BP(mean): --  RR: 19 (03 Jan 2020 08:33) (17 - 19)  SpO2: 94% (03 Jan 2020 08:33) (94% - 99%)    I&O's Summary    02 Jan 2020 07:01  -  03 Jan 2020 07:00  --------------------------------------------------------  IN: 0 mL / OUT: 1500 mL / NET: -1500 mL          PHYSICAL EXAM:  TELE: not on tele   Constitutional: NAD, awake and alert, well-developed  HEENT: Moist Mucous Membranes, Anicteric  Pulmonary: Non-labored, breath sounds are clear bilaterally, No wheezing, crackles or rhonchi  Cardiovascular: Regular, S1 and S2 nl, No murmurs, rubs, gallops or clicks  Gastrointestinal: Bowel Sounds present, soft, nontender.   Lymph: No lymphadenopathy. No peripheral edema.  Skin: No visible rashes or ulcers.  Psych:  Mood & affect appropriate    LABS: All Labs Reviewed:                   ECG:  < from: 12 Lead ECG (12.23.19 @ 15:36) >    Ventricular Rate 86 BPM    Atrial Rate 86 BPM    P-R Interval 152 ms    QRS Duration 86 ms    Q-T Interval 370 ms    QTC Calculation(Bezet) 442 ms    P Axis 35 degrees    R Axis 6 degrees    T Axis -16 degrees    Diagnosis Line Normal sinus rhythm  Nonspecific ST abnormality    < end of copied text >      Echo:  < from: TTE Echo Doppler w/o Cont (12.24.19 @ 13:55) >    Severe concentric LVH, and mild LV enlargement, with normal systolic function, estimated LVEF of 65%. Normal right ventricular size and systolic function. Grade 2diastolic dysfunction. Left atrial enlargement The aortic root is normal in size. The mitral valve is structurally normal, mild MR. The aortic valve is trileaflet without stenosis or insufficiency. Trace physiologic TR. Inadequate TR jet to estimate PA systolic pressure No significant pericardial effusion.        < end of copied text >    Radiology:  < from: Xray Chest 1 View AP/PA (10.25.19 @ 21:02) >    Portable chest x-ray is performed. Comparison is made to 10/14/2019.    The heart is not enlarged. The trachea is midline. Lungs are clear. The   osseous structures demonstrate degenerative changes.    Impression: No active disease. No change    < end of copied text >

## 2020-01-04 PROCEDURE — 99232 SBSQ HOSP IP/OBS MODERATE 35: CPT

## 2020-01-04 RX ADMIN — APIXABAN 5 MILLIGRAM(S): 2.5 TABLET, FILM COATED ORAL at 17:42

## 2020-01-04 RX ADMIN — Medication 1 GRAM(S): at 17:42

## 2020-01-04 RX ADMIN — Medication 1 GRAM(S): at 23:48

## 2020-01-04 RX ADMIN — Medication 9 UNIT(S): at 11:40

## 2020-01-04 RX ADMIN — Medication 25 MILLIGRAM(S): at 06:37

## 2020-01-04 RX ADMIN — LOSARTAN POTASSIUM 25 MILLIGRAM(S): 100 TABLET, FILM COATED ORAL at 06:37

## 2020-01-04 RX ADMIN — ATORVASTATIN CALCIUM 80 MILLIGRAM(S): 80 TABLET, FILM COATED ORAL at 21:56

## 2020-01-04 RX ADMIN — PANTOPRAZOLE SODIUM 40 MILLIGRAM(S): 20 TABLET, DELAYED RELEASE ORAL at 06:37

## 2020-01-04 RX ADMIN — Medication 9 UNIT(S): at 17:41

## 2020-01-04 RX ADMIN — TAMSULOSIN HYDROCHLORIDE 0.4 MILLIGRAM(S): 0.4 CAPSULE ORAL at 21:56

## 2020-01-04 RX ADMIN — Medication 25 MILLIGRAM(S): at 17:42

## 2020-01-04 RX ADMIN — Medication 1 GRAM(S): at 00:09

## 2020-01-04 RX ADMIN — Medication 25 MILLIGRAM(S): at 21:56

## 2020-01-04 RX ADMIN — Medication 1 GRAM(S): at 11:40

## 2020-01-04 RX ADMIN — Medication 5 MILLIGRAM(S): at 06:37

## 2020-01-04 RX ADMIN — INSULIN GLARGINE 15 UNIT(S): 100 INJECTION, SOLUTION SUBCUTANEOUS at 21:56

## 2020-01-04 RX ADMIN — INSULIN GLARGINE 15 UNIT(S): 100 INJECTION, SOLUTION SUBCUTANEOUS at 08:03

## 2020-01-04 RX ADMIN — ISOSORBIDE MONONITRATE 30 MILLIGRAM(S): 60 TABLET, EXTENDED RELEASE ORAL at 11:40

## 2020-01-04 RX ADMIN — CLOPIDOGREL BISULFATE 75 MILLIGRAM(S): 75 TABLET, FILM COATED ORAL at 11:40

## 2020-01-04 RX ADMIN — Medication 9 UNIT(S): at 08:03

## 2020-01-04 RX ADMIN — APIXABAN 5 MILLIGRAM(S): 2.5 TABLET, FILM COATED ORAL at 06:37

## 2020-01-04 RX ADMIN — Medication 1 GRAM(S): at 06:37

## 2020-01-04 RX ADMIN — Medication 25 MILLIGRAM(S): at 13:03

## 2020-01-04 NOTE — PROGRESS NOTE ADULT - SUBJECTIVE AND OBJECTIVE BOX
HPI:  Patient is a 50 y/o diabetic M who was seen at bedside and is s/p right hallux nail avulsion (DOS: 1/3/20). Patient states that he is doing well and denies any pain to the right hallux. Patient denies any over night events and denies n/v/f/c.     PAST MEDICAL & SURGICAL HISTORY:  Cerebrovascular accident  CAD S/P percutaneous coronary angioplasty  Osteomyelitis: s/p debridement  History of non-ST elevation myocardial infarction (NSTEMI)  Pulmonary embolism  Perforated gastric ulcer: s/p emergent ex-lap omentopexy and plication 6/2019  BPH (benign prostatic hyperplasia)  Hypertension  Afib  Diabetes mellitus with no complication  Diabetes  Traumatic amputation of left foot, initial encounter  Perforated gastric ulcer  H/O abdominal surgery    Allergies  fish (Hives)  No Known Drug Allergies    MEDICATIONS  (STANDING):  apixaban 5 milliGRAM(s) Oral every 12 hours  atorvastatin 80 milliGRAM(s) Oral at bedtime  bethanechol 25 milliGRAM(s) Oral three times a day  clopidogrel Tablet 75 milliGRAM(s) Oral daily  dextrose 5%. 1000 milliLiter(s) (50 mL/Hr) IV Continuous <Continuous>  dextrose 50% Injectable 12.5 Gram(s) IV Push once  dextrose 50% Injectable 25 Gram(s) IV Push once  dextrose 50% Injectable 25 Gram(s) IV Push once  insulin glargine Injectable (LANTUS) 15 Unit(s) SubCutaneous two times a day  insulin lispro (HumaLOG) corrective regimen sliding scale   SubCutaneous three times a day before meals  insulin lispro (HumaLOG) corrective regimen sliding scale   SubCutaneous at bedtime  insulin lispro Injectable (HumaLOG) 9 Unit(s) SubCutaneous three times a day before meals  isosorbide   mononitrate ER Tablet (IMDUR) 30 milliGRAM(s) Oral daily  losartan 25 milliGRAM(s) Oral daily  methimazole 5 milliGRAM(s) Oral daily  metoprolol tartrate 25 milliGRAM(s) Oral every 12 hours  pantoprazole    Tablet 40 milliGRAM(s) Oral before breakfast  predniSONE   Tablet 5 milliGRAM(s) Oral daily  sucralfate 1 Gram(s) Oral four times a day  tamsulosin 0.4 milliGRAM(s) Oral at bedtime    MEDICATIONS  (PRN):  acetaminophen   Tablet .. 650 milliGRAM(s) Oral every 6 hours PRN Temp greater or equal to 38C (100.4F), Mild Pain (1 - 3)  dextrose 40% Gel 15 Gram(s) Oral once PRN Blood Glucose LESS THAN 70 milliGRAM(s)/deciLiter  glucagon  Injectable 1 milliGRAM(s) IntraMuscular once PRN Glucose <70 milliGRAM(s)/deciLiter    FAMILY HISTORY:  FH: stroke: Father  FH: coronary artery disease: Father  FH: pulmonary embolism: Mother    Vital Signs Last 24 Hrs  T(C): 36.7 (04 Jan 2020 05:43), Max: 37.1 (03 Jan 2020 21:06)  T(F): 98 (04 Jan 2020 05:43), Max: 98.7 (03 Jan 2020 21:06)  HR: 61 (04 Jan 2020 05:43) (56 - 61)  BP: 116/62 (04 Jan 2020 05:43) (116/62 - 139/81)  BP(mean): --  RR: 18 (04 Jan 2020 05:43) (16 - 18)  SpO2: 93% (04 Jan 2020 05:43) (93% - 96%)    PHYSICAL EXAM:  Vascular: DP & PT palpable bilaterally, Capillary refill 3 seconds, Digital hair present bilaterally  Neurological: Light touch sensation not intact bilaterally  Musculoskeletal: 5/5 strength in all quadrants bilaterally, AJ & STJ ROM intact, left partial 1st ray amputation  Dermatological: s/p right hallux nail avulsion with a clean and intact nail bed with no openings, no probing, no erythema, no edema, no malodor and no clinical signs of infection.

## 2020-01-04 NOTE — PROGRESS NOTE ADULT - SUBJECTIVE AND OBJECTIVE BOX
CAPILLARY BLOOD GLUCOSE      POCT Blood Glucose.: 115 mg/dL (04 Jan 2020 07:37)  POCT Blood Glucose.: 258 mg/dL (03 Jan 2020 21:35)  POCT Blood Glucose.: 130 mg/dL (03 Jan 2020 17:01)  POCT Blood Glucose.: 151 mg/dL (03 Jan 2020 11:57)  POCT Blood Glucose.: 129 mg/dL (03 Jan 2020 08:02)      Vital Signs Last 24 Hrs  T(C): 36.7 (04 Jan 2020 05:43), Max: 37.1 (03 Jan 2020 21:06)  T(F): 98 (04 Jan 2020 05:43), Max: 98.7 (03 Jan 2020 21:06)  HR: 61 (04 Jan 2020 05:43) (53 - 61)  BP: 116/62 (04 Jan 2020 05:43) (116/62 - 149/75)  BP(mean): --  RR: 18 (04 Jan 2020 05:43) (16 - 19)  SpO2: 93% (04 Jan 2020 05:43) (93% - 96%)    General: WN/WD NAD  Respiratory: CTA B/L  CV: RRR, S1S2, no murmurs, rubs or gallops  Abdominal: Soft, NT, ND +BS, Last BM  Extremities: No edema, + peripheral pulses             atorvastatin 80 milliGRAM(s) Oral at bedtime  dextrose 40% Gel 15 Gram(s) Oral once PRN  dextrose 50% Injectable 12.5 Gram(s) IV Push once  dextrose 50% Injectable 25 Gram(s) IV Push once  dextrose 50% Injectable 25 Gram(s) IV Push once  glucagon  Injectable 1 milliGRAM(s) IntraMuscular once PRN  insulin glargine Injectable (LANTUS) 15 Unit(s) SubCutaneous two times a day  insulin lispro (HumaLOG) corrective regimen sliding scale   SubCutaneous three times a day before meals  insulin lispro (HumaLOG) corrective regimen sliding scale   SubCutaneous at bedtime  insulin lispro Injectable (HumaLOG) 9 Unit(s) SubCutaneous three times a day before meals  methimazole 5 milliGRAM(s) Oral daily  predniSONE   Tablet 5 milliGRAM(s) Oral daily

## 2020-01-04 NOTE — PROGRESS NOTE ADULT - PROBLEM SELECTOR PLAN 1
cont lantus 15 units 2x/day  cont humalog 9 units 3x/day before meals  cont mod dose humalog scale coverage  goal bg 100-180 in hosp setting  cont cons cho diet

## 2020-01-04 NOTE — CHART NOTE - NSCHARTNOTEFT_GEN_A_CORE
Assessment: Pt with good po intake, consuming 100% of meals. D/C planning in progress. Pt reports BM yesterday. No new wt avail. glu fairly controlled, although >250 last night.     Factors impacting intake: [x ] none [ ] nausea  [ ] vomiting [ ] diarrhea [ ] constipation  [ ]chewing problems [ ] swallowing issues  [ ] other:     Diet Presciption: Diet, DASH/TLC:   Sodium & Cholesterol Restricted  Consistent Carbohydrate {No Snacks} (12-23-19 @ 19:13)    Intake: 100%    Current Weight: n/a  % Weight Change    Pertinent Medications: MEDICATIONS  (STANDING):  apixaban 5 milliGRAM(s) Oral every 12 hours  atorvastatin 80 milliGRAM(s) Oral at bedtime  bethanechol 25 milliGRAM(s) Oral three times a day  clopidogrel Tablet 75 milliGRAM(s) Oral daily  dextrose 5%. 1000 milliLiter(s) (50 mL/Hr) IV Continuous <Continuous>  dextrose 50% Injectable 12.5 Gram(s) IV Push once  dextrose 50% Injectable 25 Gram(s) IV Push once  dextrose 50% Injectable 25 Gram(s) IV Push once  insulin glargine Injectable (LANTUS) 15 Unit(s) SubCutaneous two times a day  insulin lispro (HumaLOG) corrective regimen sliding scale   SubCutaneous three times a day before meals  insulin lispro (HumaLOG) corrective regimen sliding scale   SubCutaneous at bedtime  insulin lispro Injectable (HumaLOG) 9 Unit(s) SubCutaneous three times a day before meals  isosorbide   mononitrate ER Tablet (IMDUR) 30 milliGRAM(s) Oral daily  losartan 25 milliGRAM(s) Oral daily  methimazole 5 milliGRAM(s) Oral daily  metoprolol tartrate 25 milliGRAM(s) Oral every 12 hours  pantoprazole    Tablet 40 milliGRAM(s) Oral before breakfast  predniSONE   Tablet 5 milliGRAM(s) Oral daily  sucralfate 1 Gram(s) Oral four times a day  tamsulosin 0.4 milliGRAM(s) Oral at bedtime    MEDICATIONS  (PRN):  acetaminophen   Tablet .. 650 milliGRAM(s) Oral every 6 hours PRN Temp greater or equal to 38C (100.4F), Mild Pain (1 - 3)  dextrose 40% Gel 15 Gram(s) Oral once PRN Blood Glucose LESS THAN 70 milliGRAM(s)/deciLiter  glucagon  Injectable 1 milliGRAM(s) IntraMuscular once PRN Glucose <70 milliGRAM(s)/deciLiter    Pertinent Labs:  12-24 [id number] 8.4 %<H> 12-24 Chol 110 mg/dL LDL 43 mg/dL HDL 50 mg/dL Trig 83 mg/dL     CAPILLARY BLOOD GLUCOSE      POCT Blood Glucose.: 115 mg/dL (04 Jan 2020 07:37)  POCT Blood Glucose.: 258 mg/dL (03 Jan 2020 21:35)  POCT Blood Glucose.: 130 mg/dL (03 Jan 2020 17:01)  POCT Blood Glucose.: 151 mg/dL (03 Jan 2020 11:57)    Skin:     Estimated Needs:   [x ] no change since previous assessment  [ ] recalculated:     Previous Nutrition Diagnosis: (X) altered nutrition related labs  [ ] Inadequate Energy Intake [ ]Inadequate Oral Intake [ ] Excessive Energy Intake   [ ] Underweight [ ] Increased Nutrient Needs [ ] Overweight/Obesity   [ ] Altered GI Function [ ] Unintended Weight Loss [ ] Food & Nutrition Related Knowledge Deficit [ ] Malnutrition     Nutrition Diagnosis is [x ] ongoing  [ ] resolved [ ] not applicable     New Nutrition Diagnosis: [ x] not applicable       Interventions: Pt previously educated this admission; denies any questions at this time  Recommend  [ ] Change Diet To:  [ ] Nutrition Supplement  [ ] Nutrition Support  [ ] Other:     Monitoring and Evaluation:   [ ] PO intake [ x ] Tolerance to diet prescription [ x ] weights [ x ] labs[ x ] follow up per protocol  [ ] other:

## 2020-01-04 NOTE — PROGRESS NOTE ADULT - ASSESSMENT
50 yo male with PMHx significant of CAD s/p BMS to RCA (8/2019), RUL subsegmental PE (6/2019, on Eliquis), hx of NSTEMI and PAF s/p ex-lap omentopexy and plication for perforated antral ulcer (5/2019), osteomyelitis s/p debridement, CVA s/p tPA (8/2019), HTN and DM2 (not on insulin) presenting to the ED with right sided weakness, facial droop, and slurred speech.    Hx CVA  - MR head negative.   - EEG unremarkable   - Continue Plavix and high-intensity statin  - Neuro following    Afib  -has maintained NSR   - Amiodarone was previously discontinued as it may have been causing his thyroid issues.  - Continue with Lopressor BID with hold parameters  - Continue with ELiquis BID  - Echo 12/24 as delineated above revealing severe LVH, normal systolic function ef 65% grade 2 diastolic dysfunction, mild MR, trace TR     CAD s/p PCI  - No symptoms suggestive of ischemia  - Continue Plavix 75 QD  - Continue Statin  - Continue Imdur  - Continue ARB and beta blocker     Awaiting D/C to ELIAS Gallego NP  Cardiology

## 2020-01-04 NOTE — PROGRESS NOTE ADULT - SUBJECTIVE AND OBJECTIVE BOX
[facility] -- Bolivar Carbajal, Brittany Gallegos, Reynaldo Sweeney Savella  Office # [phone]    Follow Up:   Neuro Symptoms    Subjective/Observations: sitting on the chair, comfortable. Denies CP, SOB or palpitations      REVIEW OF SYSTEMS: All other review of systems is negative unless indicated above    PAST MEDICAL & SURGICAL HISTORY:  Cerebrovascular accident  CAD S/P percutaneous coronary angioplasty  Osteomyelitis: s/p debridement  History of non-ST elevation myocardial infarction (NSTEMI)  Pulmonary embolism  Perforated gastric ulcer: s/p emergent ex-lap omentopexy and plication 6/2019  BPH (benign prostatic hyperplasia)  Hypertension  Afib  Diabetes mellitus with no complication  Diabetes  Traumatic amputation of left foot, initial encounter  Perforated gastric ulcer  H/O abdominal surgery      MEDICATIONS  (STANDING):  apixaban 5 milliGRAM(s) Oral every 12 hours  atorvastatin 80 milliGRAM(s) Oral at bedtime  bethanechol 25 milliGRAM(s) Oral three times a day  clopidogrel Tablet 75 milliGRAM(s) Oral daily  dextrose 5%. 1000 milliLiter(s) (50 mL/Hr) IV Continuous <Continuous>  dextrose 50% Injectable 12.5 Gram(s) IV Push once  dextrose 50% Injectable 25 Gram(s) IV Push once  dextrose 50% Injectable 25 Gram(s) IV Push once  insulin glargine Injectable (LANTUS) 15 Unit(s) SubCutaneous two times a day  insulin lispro (HumaLOG) corrective regimen sliding scale   SubCutaneous three times a day before meals  insulin lispro (HumaLOG) corrective regimen sliding scale   SubCutaneous at bedtime  insulin lispro Injectable (HumaLOG) 9 Unit(s) SubCutaneous three times a day before meals  isosorbide   mononitrate ER Tablet (IMDUR) 30 milliGRAM(s) Oral daily  losartan 25 milliGRAM(s) Oral daily  methimazole 5 milliGRAM(s) Oral daily  metoprolol tartrate 25 milliGRAM(s) Oral every 12 hours  pantoprazole    Tablet 40 milliGRAM(s) Oral before breakfast  predniSONE   Tablet 5 milliGRAM(s) Oral daily  sucralfate 1 Gram(s) Oral four times a day  tamsulosin 0.4 milliGRAM(s) Oral at bedtime    MEDICATIONS  (PRN):  acetaminophen   Tablet .. 650 milliGRAM(s) Oral every 6 hours PRN Temp greater or equal to 38C (100.4F), Mild Pain (1 - 3)  dextrose 40% Gel 15 Gram(s) Oral once PRN Blood Glucose LESS THAN 70 milliGRAM(s)/deciLiter  glucagon  Injectable 1 milliGRAM(s) IntraMuscular once PRN Glucose <70 milliGRAM(s)/deciLiter      Allergies    fish (Hives)  No Known Drug Allergies    Intolerances        Vital Signs Last 24 Hrs  T(C): 36.9 (04 Jan 2020 13:36), Max: 37.1 (03 Jan 2020 21:06)  T(F): 98.4 (04 Jan 2020 13:36), Max: 98.7 (03 Jan 2020 21:06)  HR: 70 (04 Jan 2020 13:36) (56 - 70)  BP: 95/56 (04 Jan 2020 13:36) (95/56 - 118/72)  BP(mean): --  RR: 17 (04 Jan 2020 13:36) (17 - 18)  SpO2: 97% (04 Jan 2020 13:36) (93% - 97%)    I&O's Summary    03 Jan 2020 07:01  -  04 Jan 2020 07:00  --------------------------------------------------------  IN: 240 mL / OUT: 550 mL / NET: -310 mL    04 Jan 2020 07:01  -  04 Jan 2020 14:59  --------------------------------------------------------  IN: 480 mL / OUT: 900 mL / NET: -420 mL          PHYSICAL EXAM:  TELE: OFF TELE  Constitutional: NAD, awake and alert, well-developed  HEENT: Moist Mucous Membranes, Anicteric  Pulmonary: Non-labored, breath sounds are clear bilaterally, No wheezing, rales or rhonchi  Cardiovascular: Regular, S1 and S2, No murmurs, rubs, gallops or clicks  Gastrointestinal: Bowel Sounds present, soft, nontender.   Lymph: No peripheral edema. No lymphadenopathy.  Skin: No visible rashes or ulcers.  Psych:  Mood & affect appropriate    LABS: All Labs Reviewed:      EXAM:  ECHO TTE WO CON COMP W DOPPLR         PROCEDURE DATE:  12/24/2019        INTERPRETATION:  INDICATION: Coronary artery disease    Blood Pressure 117/71    Height 187     Weight 93       BSA 2.2    Dimensions:    LA 4.2       Normal Values: 2.0 - 4.0 cm    Ao 4.0        Normal Values: 2.0 - 3.8 cm  SEPTUM 1.6       Normal Values: 0.6 - 1.2 cm  PWT 1.6       Normal Values: 0.6 - 1.1 cm  LVIDd 5.8         Normal Values: 3.0 - 5.6 cm  LVIDs 3.2         Normal Values: 1.8 - 4.0 cm    Derived Variables:  LVMI g/m2  RWT  Fractional Short  Ejection Fraction    Doppler Peak v. AoV= (m/sec)    OBSERVATIONS:    Mitral Valve: normal, mild MR.  Aortic Valve/Aorta: normal trileaflet aortic valve.  Tricuspid Valve: normal with trace TR.  Pulmonic Valve: normal  Left Atrium: Enlarged  Right Atrium: normal  Left Ventricle: Severe concentric LVH with normal systolic function, estimated LVEF of 65%.  Right Ventricle: normal size and systolic function.  Pericardium/Pleura: no significant pleural effusion, no significant pericardial effusion.  Pulmonary/RV Pressure: Inadequate TR jet to estimate PA systolic pressure  LV Diastolic Function: Grade 2diastolic dysfunction.    Severe concentric LVH, and mild LV enlargement, with normal systolic function, estimated LVEF of 65%. Normal right ventricular size and systolic function. Grade 2diastolic dysfunction. Left atrial enlargement The aortic root is normal in size. The mitral valve is structurally normal, mild MR. The aortic valve is trileaflet without stenosis or insufficiency. Trace physiologic TR. Inadequate TR jet to estimate PA systolic pressure No significant pericardial effusion.          JOVANNI ALVAREZ M.D., ATTENDING CARDIOLOGIST  This document has been electronically signed. Dec 25 2019 11:45AM

## 2020-01-04 NOTE — PROGRESS NOTE ADULT - SUBJECTIVE AND OBJECTIVE BOX
Patient is a 49y old  Male who presents with a chief complaint of R sided numbness (04 Jan 2020 10:31)      INTERVAL HPI/OVERNIGHT EVENTS: Patient seen and examined. NAD. No complaints.    Vital Signs Last 24 Hrs  T(C): 36.7 (04 Jan 2020 05:43), Max: 37.1 (03 Jan 2020 21:06)  T(F): 98 (04 Jan 2020 05:43), Max: 98.7 (03 Jan 2020 21:06)  HR: 61 (04 Jan 2020 05:43) (56 - 61)  BP: 116/62 (04 Jan 2020 05:43) (116/62 - 139/81)  BP(mean): --  RR: 18 (04 Jan 2020 05:43) (16 - 18)  SpO2: 93% (04 Jan 2020 05:43) (93% - 96%)              CAPILLARY BLOOD GLUCOSE      POCT Blood Glucose.: 115 mg/dL (04 Jan 2020 07:37)  POCT Blood Glucose.: 258 mg/dL (03 Jan 2020 21:35)  POCT Blood Glucose.: 130 mg/dL (03 Jan 2020 17:01)  POCT Blood Glucose.: 151 mg/dL (03 Jan 2020 11:57)              acetaminophen   Tablet .. 650 milliGRAM(s) Oral every 6 hours PRN  apixaban 5 milliGRAM(s) Oral every 12 hours  atorvastatin 80 milliGRAM(s) Oral at bedtime  bethanechol 25 milliGRAM(s) Oral three times a day  clopidogrel Tablet 75 milliGRAM(s) Oral daily  dextrose 40% Gel 15 Gram(s) Oral once PRN  dextrose 5%. 1000 milliLiter(s) IV Continuous <Continuous>  dextrose 50% Injectable 12.5 Gram(s) IV Push once  dextrose 50% Injectable 25 Gram(s) IV Push once  dextrose 50% Injectable 25 Gram(s) IV Push once  glucagon  Injectable 1 milliGRAM(s) IntraMuscular once PRN  insulin glargine Injectable (LANTUS) 15 Unit(s) SubCutaneous two times a day  insulin lispro (HumaLOG) corrective regimen sliding scale   SubCutaneous three times a day before meals  insulin lispro (HumaLOG) corrective regimen sliding scale   SubCutaneous at bedtime  insulin lispro Injectable (HumaLOG) 9 Unit(s) SubCutaneous three times a day before meals  isosorbide   mononitrate ER Tablet (IMDUR) 30 milliGRAM(s) Oral daily  losartan 25 milliGRAM(s) Oral daily  methimazole 5 milliGRAM(s) Oral daily  metoprolol tartrate 25 milliGRAM(s) Oral every 12 hours  pantoprazole    Tablet 40 milliGRAM(s) Oral before breakfast  predniSONE   Tablet 5 milliGRAM(s) Oral daily  sucralfate 1 Gram(s) Oral four times a day  tamsulosin 0.4 milliGRAM(s) Oral at bedtime              REVIEW OF SYSTEMS:  CONSTITUTIONAL: No fever, no weight loss, or no fatigue  NECK: No pain, no stiffness  RESPIRATORY: No cough, no wheezing, no chills, no hemoptysis, No shortness of breath  CARDIOVASCULAR: No chest pain, no palpitations, no dizziness, no leg swelling  GASTROINTESTINAL: No abdominal pain. No nausea, no vomiting, no hematemesis; No diarrhea, no constipation. No melena, no hematochezia.  GENITOURINARY: No dysuria, no frequency, no hematuria, no incontinence  NEUROLOGICAL: No headaches, no loss of strength, no numbness, no tremors  SKIN: No itching, no burning  MUSCULOSKELETAL: No joint pain, no swelling; No muscle, no back, no extremity pain  PSYCHIATRIC: No depression, no mood swings,   HEME/LYMPH: No easy bruising, no bleeding gums  ALLERY AND IMMUNOLOGIC: No hives       Consultant(s) Notes Reviewed:  [X] YES  [ ] NO    PHYSICAL EXAM:  GENERAL: NAD  HEAD:  Atraumatic, Normocephalic  EYES: EOMI, PERRLA, conjunctiva and sclera clear  ENMT: No tonsillar erythema, exudates, or enlargement; Moist mucous membranes  NECK: Supple, No JVD  NERVOUS SYSTEM:  Awake & alert  CHEST/LUNG: Clear to auscultation bilaterally; No rales, rhonchi, wheezing,  HEART: Regular rate and rhythm  ABDOMEN: Soft, Nontender, Nondistended; Bowel sounds present  EXTREMITIES:  No clubbing, cyanosis, or edema  LYMPH: No lymphadenopathy noted  SKIN: No rashes      Advanced care planning discussed with patient/family [X] YES   [ ] NO    Advanced care planning discussed with patient/family. Advanced care planning forms reviewed/discussed/completed. 20 minutes spent.

## 2020-01-04 NOTE — PROGRESS NOTE ADULT - PROBLEM SELECTOR PLAN 1
Patient examined and evaluated  s/p right hallux nail avulsion   Right hallux was redressed with iodosorb and dsd  Patient is stable for d/c per podiatry standpoint    Wound Care instructions  1) remove dressing and cleanse wound with normal saline and then pat dry  2) Apply iodosorb to nail bed and then apply dsd  3) Change dressing daily and monitor for signs of infection  4) Patient to f/u with Dr Hale or Perez within 1 week of d/c

## 2020-01-05 PROCEDURE — 99232 SBSQ HOSP IP/OBS MODERATE 35: CPT

## 2020-01-05 RX ADMIN — INSULIN GLARGINE 15 UNIT(S): 100 INJECTION, SOLUTION SUBCUTANEOUS at 21:45

## 2020-01-05 RX ADMIN — Medication 25 MILLIGRAM(S): at 12:21

## 2020-01-05 RX ADMIN — Medication 25 MILLIGRAM(S): at 06:04

## 2020-01-05 RX ADMIN — PANTOPRAZOLE SODIUM 40 MILLIGRAM(S): 20 TABLET, DELAYED RELEASE ORAL at 06:04

## 2020-01-05 RX ADMIN — ATORVASTATIN CALCIUM 80 MILLIGRAM(S): 80 TABLET, FILM COATED ORAL at 21:45

## 2020-01-05 RX ADMIN — Medication 25 MILLIGRAM(S): at 21:45

## 2020-01-05 RX ADMIN — Medication 9 UNIT(S): at 08:13

## 2020-01-05 RX ADMIN — APIXABAN 5 MILLIGRAM(S): 2.5 TABLET, FILM COATED ORAL at 06:04

## 2020-01-05 RX ADMIN — Medication 1 GRAM(S): at 21:45

## 2020-01-05 RX ADMIN — CLOPIDOGREL BISULFATE 75 MILLIGRAM(S): 75 TABLET, FILM COATED ORAL at 12:21

## 2020-01-05 RX ADMIN — Medication 9 UNIT(S): at 17:23

## 2020-01-05 RX ADMIN — Medication 25 MILLIGRAM(S): at 17:24

## 2020-01-05 RX ADMIN — INSULIN GLARGINE 15 UNIT(S): 100 INJECTION, SOLUTION SUBCUTANEOUS at 08:14

## 2020-01-05 RX ADMIN — APIXABAN 5 MILLIGRAM(S): 2.5 TABLET, FILM COATED ORAL at 17:24

## 2020-01-05 RX ADMIN — ISOSORBIDE MONONITRATE 30 MILLIGRAM(S): 60 TABLET, EXTENDED RELEASE ORAL at 12:21

## 2020-01-05 RX ADMIN — TAMSULOSIN HYDROCHLORIDE 0.4 MILLIGRAM(S): 0.4 CAPSULE ORAL at 21:45

## 2020-01-05 RX ADMIN — Medication 1 GRAM(S): at 12:21

## 2020-01-05 RX ADMIN — Medication 9 UNIT(S): at 12:20

## 2020-01-05 RX ADMIN — Medication 5 MILLIGRAM(S): at 06:04

## 2020-01-05 RX ADMIN — Medication 1 GRAM(S): at 17:24

## 2020-01-05 RX ADMIN — Medication 1 GRAM(S): at 06:04

## 2020-01-05 RX ADMIN — LOSARTAN POTASSIUM 25 MILLIGRAM(S): 100 TABLET, FILM COATED ORAL at 06:04

## 2020-01-05 NOTE — PROGRESS NOTE ADULT - SUBJECTIVE AND OBJECTIVE BOX
CAPILLARY BLOOD GLUCOSE      POCT Blood Glucose.: 106 mg/dL (05 Jan 2020 07:51)  POCT Blood Glucose.: 80 mg/dL (04 Jan 2020 21:51)  POCT Blood Glucose.: 112 mg/dL (04 Jan 2020 17:00)  POCT Blood Glucose.: 135 mg/dL (04 Jan 2020 11:22)      Vital Signs Last 24 Hrs  T(C): 36.6 (05 Jan 2020 05:49), Max: 37 (04 Jan 2020 20:56)  T(F): 97.8 (05 Jan 2020 05:49), Max: 98.6 (04 Jan 2020 20:56)  HR: 56 (05 Jan 2020 05:49) (56 - 92)  BP: 129/75 (05 Jan 2020 05:49) (95/56 - 129/78)  BP(mean): 86 (04 Jan 2020 20:56) (86 - 86)  RR: 16 (05 Jan 2020 05:49) (16 - 17)  SpO2: 97% (05 Jan 2020 05:49) (95% - 97%)    General: WN/WD NAD  Respiratory: CTA B/L  CV: RRR, S1S2, no murmurs, rubs or gallops  Abdominal: Soft, NT, ND +BS, Last BM  Extremities: No edema, + peripheral pulses             atorvastatin 80 milliGRAM(s) Oral at bedtime  dextrose 40% Gel 15 Gram(s) Oral once PRN  dextrose 50% Injectable 12.5 Gram(s) IV Push once  dextrose 50% Injectable 25 Gram(s) IV Push once  dextrose 50% Injectable 25 Gram(s) IV Push once  glucagon  Injectable 1 milliGRAM(s) IntraMuscular once PRN  insulin glargine Injectable (LANTUS) 15 Unit(s) SubCutaneous two times a day  insulin lispro (HumaLOG) corrective regimen sliding scale   SubCutaneous three times a day before meals  insulin lispro (HumaLOG) corrective regimen sliding scale   SubCutaneous at bedtime  insulin lispro Injectable (HumaLOG) 9 Unit(s) SubCutaneous three times a day before meals  methimazole 5 milliGRAM(s) Oral daily  predniSONE   Tablet 5 milliGRAM(s) Oral daily

## 2020-01-05 NOTE — PROGRESS NOTE ADULT - SUBJECTIVE AND OBJECTIVE BOX
Patient is a 49y old  Male who presents with a chief complaint of R sided numbness (05 Jan 2020 08:49)      INTERVAL HPI/OVERNIGHT EVENTS: Patient seen and examined. NAD. No complaints.    Vital Signs Last 24 Hrs  T(C): 36.6 (05 Jan 2020 05:49), Max: 37 (04 Jan 2020 20:56)  T(F): 97.8 (05 Jan 2020 05:49), Max: 98.6 (04 Jan 2020 20:56)  HR: 56 (05 Jan 2020 05:49) (56 - 92)  BP: 129/75 (05 Jan 2020 05:49) (95/56 - 129/78)  BP(mean): 86 (04 Jan 2020 20:56) (86 - 86)  RR: 16 (05 Jan 2020 05:49) (16 - 17)  SpO2: 97% (05 Jan 2020 05:49) (95% - 97%)              CAPILLARY BLOOD GLUCOSE      POCT Blood Glucose.: 106 mg/dL (05 Jan 2020 07:51)  POCT Blood Glucose.: 80 mg/dL (04 Jan 2020 21:51)  POCT Blood Glucose.: 112 mg/dL (04 Jan 2020 17:00)  POCT Blood Glucose.: 135 mg/dL (04 Jan 2020 11:22)              acetaminophen   Tablet .. 650 milliGRAM(s) Oral every 6 hours PRN  apixaban 5 milliGRAM(s) Oral every 12 hours  atorvastatin 80 milliGRAM(s) Oral at bedtime  bethanechol 25 milliGRAM(s) Oral three times a day  clopidogrel Tablet 75 milliGRAM(s) Oral daily  dextrose 40% Gel 15 Gram(s) Oral once PRN  dextrose 5%. 1000 milliLiter(s) IV Continuous <Continuous>  dextrose 50% Injectable 12.5 Gram(s) IV Push once  dextrose 50% Injectable 25 Gram(s) IV Push once  dextrose 50% Injectable 25 Gram(s) IV Push once  glucagon  Injectable 1 milliGRAM(s) IntraMuscular once PRN  insulin glargine Injectable (LANTUS) 15 Unit(s) SubCutaneous two times a day  insulin lispro (HumaLOG) corrective regimen sliding scale   SubCutaneous three times a day before meals  insulin lispro (HumaLOG) corrective regimen sliding scale   SubCutaneous at bedtime  insulin lispro Injectable (HumaLOG) 9 Unit(s) SubCutaneous three times a day before meals  isosorbide   mononitrate ER Tablet (IMDUR) 30 milliGRAM(s) Oral daily  losartan 25 milliGRAM(s) Oral daily  methimazole 5 milliGRAM(s) Oral daily  metoprolol tartrate 25 milliGRAM(s) Oral every 12 hours  pantoprazole    Tablet 40 milliGRAM(s) Oral before breakfast  predniSONE   Tablet 5 milliGRAM(s) Oral daily  sucralfate 1 Gram(s) Oral four times a day  tamsulosin 0.4 milliGRAM(s) Oral at bedtime              REVIEW OF SYSTEMS:  CONSTITUTIONAL: No fever, no weight loss, or no fatigue  NECK: No pain, no stiffness  RESPIRATORY: No cough, no wheezing, no chills, no hemoptysis, No shortness of breath  CARDIOVASCULAR: No chest pain, no palpitations, no dizziness, no leg swelling  GASTROINTESTINAL: No abdominal pain. No nausea, no vomiting, no hematemesis; No diarrhea, no constipation. No melena, no hematochezia.  GENITOURINARY: No dysuria, no frequency, no hematuria, no incontinence  NEUROLOGICAL: No headaches, no loss of strength, no numbness, no tremors  SKIN: No itching, no burning  MUSCULOSKELETAL: No joint pain, no swelling; No muscle, no back, no extremity pain  PSYCHIATRIC: No depression, no mood swings,   HEME/LYMPH: No easy bruising, no bleeding gums  ALLERY AND IMMUNOLOGIC: No hives       Consultant(s) Notes Reviewed:  [X] YES  [ ] NO    PHYSICAL EXAM:  GENERAL: NAD  HEAD:  Atraumatic, Normocephalic  EYES: EOMI, PERRLA, conjunctiva and sclera clear  ENMT: No tonsillar erythema, exudates, or enlargement; Moist mucous membranes  NECK: Supple, No JVD  NERVOUS SYSTEM:  Awake & alert  CHEST/LUNG: Clear to auscultation bilaterally; No rales, rhonchi, wheezing,  HEART: Regular rate and rhythm  ABDOMEN: Soft, Nontender, Nondistended; Bowel sounds present  EXTREMITIES:  No clubbing, cyanosis, or edema  LYMPH: No lymphadenopathy noted  SKIN: No rashes      Advanced care planning discussed with patient/family [X] YES   [ ] NO    Advanced care planning discussed with patient/family. Advanced care planning forms reviewed/discussed/completed. 20 minutes spent.

## 2020-01-05 NOTE — PROGRESS NOTE ADULT - SUBJECTIVE AND OBJECTIVE BOX
[facility] -- Bolivar Carbajal, El, Brittany, Reynaldo Sweeney Savella  Office # [phone]      Follow Up:    cad    Subjective/Observations:   No events overnight resting comfortably in bed.  No complaints of chest pain, dyspnea, or palpitations reported. No signs of orthopnea or PND.      REVIEW OF SYSTEMS: All other review of systems is negative unless indicated above    PAST MEDICAL & SURGICAL HISTORY:  Cerebrovascular accident  CAD S/P percutaneous coronary angioplasty  Osteomyelitis: s/p debridement  History of non-ST elevation myocardial infarction (NSTEMI)  Pulmonary embolism  Perforated gastric ulcer: s/p emergent ex-lap omentopexy and plication 6/2019  BPH (benign prostatic hyperplasia)  Hypertension  Afib  Diabetes mellitus with no complication  Diabetes  Traumatic amputation of left foot, initial encounter  Perforated gastric ulcer  H/O abdominal surgery      MEDICATIONS  (STANDING):  apixaban 5 milliGRAM(s) Oral every 12 hours  atorvastatin 80 milliGRAM(s) Oral at bedtime  bethanechol 25 milliGRAM(s) Oral three times a day  clopidogrel Tablet 75 milliGRAM(s) Oral daily  dextrose 5%. 1000 milliLiter(s) (50 mL/Hr) IV Continuous <Continuous>  dextrose 50% Injectable 12.5 Gram(s) IV Push once  dextrose 50% Injectable 25 Gram(s) IV Push once  dextrose 50% Injectable 25 Gram(s) IV Push once  insulin glargine Injectable (LANTUS) 15 Unit(s) SubCutaneous two times a day  insulin lispro (HumaLOG) corrective regimen sliding scale   SubCutaneous three times a day before meals  insulin lispro (HumaLOG) corrective regimen sliding scale   SubCutaneous at bedtime  insulin lispro Injectable (HumaLOG) 9 Unit(s) SubCutaneous three times a day before meals  isosorbide   mononitrate ER Tablet (IMDUR) 30 milliGRAM(s) Oral daily  losartan 25 milliGRAM(s) Oral daily  methimazole 5 milliGRAM(s) Oral daily  metoprolol tartrate 25 milliGRAM(s) Oral every 12 hours  pantoprazole    Tablet 40 milliGRAM(s) Oral before breakfast  predniSONE   Tablet 5 milliGRAM(s) Oral daily  sucralfate 1 Gram(s) Oral four times a day  tamsulosin 0.4 milliGRAM(s) Oral at bedtime    MEDICATIONS  (PRN):  acetaminophen   Tablet .. 650 milliGRAM(s) Oral every 6 hours PRN Temp greater or equal to 38C (100.4F), Mild Pain (1 - 3)  dextrose 40% Gel 15 Gram(s) Oral once PRN Blood Glucose LESS THAN 70 milliGRAM(s)/deciLiter  glucagon  Injectable 1 milliGRAM(s) IntraMuscular once PRN Glucose <70 milliGRAM(s)/deciLiter      Allergies    fish (Hives)  No Known Drug Allergies    Intolerances        Vital Signs Last 24 Hrs  T(C): 36.6 (05 Jan 2020 05:49), Max: 37 (04 Jan 2020 20:56)  T(F): 97.8 (05 Jan 2020 05:49), Max: 98.6 (04 Jan 2020 20:56)  HR: 56 (05 Jan 2020 05:49) (56 - 92)  BP: 129/75 (05 Jan 2020 05:49) (95/56 - 129/78)  BP(mean): 86 (04 Jan 2020 20:56) (86 - 86)  RR: 16 (05 Jan 2020 05:49) (16 - 17)  SpO2: 97% (05 Jan 2020 05:49) (95% - 97%)    I&O's Summary    04 Jan 2020 07:01  -  05 Jan 2020 07:00  --------------------------------------------------------  IN: 480 mL / OUT: 5150 mL / NET: -4670 mL          PHYSICAL EXAM:  TELE: not on tele   Constitutional: NAD, awake and alert, well-developed  HEENT: Moist Mucous Membranes, Anicteric  Pulmonary: Non-labored, breath sounds are clear bilaterally, No wheezing, crackles or rhonchi  Cardiovascular: Regular, S1 and S2 nl, No murmurs, rubs, gallops or clicks  Gastrointestinal: Bowel Sounds present, soft, nontender.   Lymph: No lymphadenopathy. No peripheral edema.  Skin: No visible rashes or ulcers.  Psych:  Mood & affect appropriate    LABS: All Labs Reviewed:                   ECG:      < from: 12 Lead ECG (12.23.19 @ 15:36) >    Ventricular Rate 86 BPM    Atrial Rate 86 BPM    P-R Interval 152 ms    QRS Duration 86 ms    Q-T Interval 370 ms    QTC Calculation(Bezet) 442 ms    P Axis 35 degrees    R Axis 6 degrees    T Axis -16 degrees    Diagnosis Line Normal sinus rhythm  Nonspecific ST abnormality    < end of copied text >    Echo:  < from: TTE Echo Doppler w/o Cont (12.24.19 @ 13:55) >    Severe concentric LVH, and mild LV enlargement, with normal systolic function, estimated LVEF of 65%. Normal right ventricular size and systolic function. Grade 2diastolic dysfunction. Left atrial enlargement The aortic root is normal in size. The mitral valve is structurally normal, mild MR. The aortic valve is trileaflet without stenosis or insufficiency. Trace physiologic TR. Inadequate TR jet to estimate PA systolic pressure No significant pericardial effusion.        < end of copied text >    Radiology:           Cardiology [facility] -- Bolivar Carbajal, El, Brittany, Reynaldo Sweeney Savella  Office # [phone]      Follow Up:    cad    Subjective/Observations:   No events overnight resting comfortably in bed.  No complaints of chest pain, dyspnea, or palpitations reported. No signs of orthopnea or PND.      REVIEW OF SYSTEMS: All other review of systems is negative unless indicated above    PAST MEDICAL & SURGICAL HISTORY:  Cerebrovascular accident  CAD S/P percutaneous coronary angioplasty  Osteomyelitis: s/p debridement  History of non-ST elevation myocardial infarction (NSTEMI)  Pulmonary embolism  Perforated gastric ulcer: s/p emergent ex-lap omentopexy and plication 6/2019  BPH (benign prostatic hyperplasia)  Hypertension  Afib  Diabetes mellitus with no complication  Diabetes  Traumatic amputation of left foot, initial encounter  Perforated gastric ulcer  H/O abdominal surgery      MEDICATIONS  (STANDING):  apixaban 5 milliGRAM(s) Oral every 12 hours  atorvastatin 80 milliGRAM(s) Oral at bedtime  bethanechol 25 milliGRAM(s) Oral three times a day  clopidogrel Tablet 75 milliGRAM(s) Oral daily  dextrose 5%. 1000 milliLiter(s) (50 mL/Hr) IV Continuous <Continuous>  dextrose 50% Injectable 12.5 Gram(s) IV Push once  dextrose 50% Injectable 25 Gram(s) IV Push once  dextrose 50% Injectable 25 Gram(s) IV Push once  insulin glargine Injectable (LANTUS) 15 Unit(s) SubCutaneous two times a day  insulin lispro (HumaLOG) corrective regimen sliding scale   SubCutaneous three times a day before meals  insulin lispro (HumaLOG) corrective regimen sliding scale   SubCutaneous at bedtime  insulin lispro Injectable (HumaLOG) 9 Unit(s) SubCutaneous three times a day before meals  isosorbide   mononitrate ER Tablet (IMDUR) 30 milliGRAM(s) Oral daily  losartan 25 milliGRAM(s) Oral daily  methimazole 5 milliGRAM(s) Oral daily  metoprolol tartrate 25 milliGRAM(s) Oral every 12 hours  pantoprazole    Tablet 40 milliGRAM(s) Oral before breakfast  predniSONE   Tablet 5 milliGRAM(s) Oral daily  sucralfate 1 Gram(s) Oral four times a day  tamsulosin 0.4 milliGRAM(s) Oral at bedtime    MEDICATIONS  (PRN):  acetaminophen   Tablet .. 650 milliGRAM(s) Oral every 6 hours PRN Temp greater or equal to 38C (100.4F), Mild Pain (1 - 3)  dextrose 40% Gel 15 Gram(s) Oral once PRN Blood Glucose LESS THAN 70 milliGRAM(s)/deciLiter  glucagon  Injectable 1 milliGRAM(s) IntraMuscular once PRN Glucose <70 milliGRAM(s)/deciLiter      Allergies    fish (Hives)  No Known Drug Allergies    Intolerances        Vital Signs Last 24 Hrs  T(C): 36.6 (05 Jan 2020 05:49), Max: 37 (04 Jan 2020 20:56)  T(F): 97.8 (05 Jan 2020 05:49), Max: 98.6 (04 Jan 2020 20:56)  HR: 56 (05 Jan 2020 05:49) (56 - 92)  BP: 129/75 (05 Jan 2020 05:49) (95/56 - 129/78)  BP(mean): 86 (04 Jan 2020 20:56) (86 - 86)  RR: 16 (05 Jan 2020 05:49) (16 - 17)  SpO2: 97% (05 Jan 2020 05:49) (95% - 97%)    I&O's Summary    04 Jan 2020 07:01  -  05 Jan 2020 07:00  --------------------------------------------------------  IN: 480 mL / OUT: 5150 mL / NET: -4670 mL          PHYSICAL EXAM:  TELE: not on tele   Constitutional: NAD, awake   HEENT: Moist Mucous Membranes, Anicteric  Pulmonary: Non-labored, breath sounds are clear bilaterally, No wheezing, crackles or rhonchi  Cardiovascular: Regular, S1 and S2 nl, No murmurs, rubs, gallops or clicks  Gastrointestinal: Bowel Sounds present, soft, nontender.   Lymph: No lymphadenopathy. No peripheral edema.  Skin: No visible rashes or ulcers.  Psych:  Mood & affect appropriate    LABS: All Labs Reviewed:                   ECG:      < from: 12 Lead ECG (12.23.19 @ 15:36) >    Ventricular Rate 86 BPM    Atrial Rate 86 BPM    P-R Interval 152 ms    QRS Duration 86 ms    Q-T Interval 370 ms    QTC Calculation(Bezet) 442 ms    P Axis 35 degrees    R Axis 6 degrees    T Axis -16 degrees    Diagnosis Line Normal sinus rhythm  Nonspecific ST abnormality    < end of copied text >    Echo:  < from: TTE Echo Doppler w/o Cont (12.24.19 @ 13:55) >    Severe concentric LVH, and mild LV enlargement, with normal systolic function, estimated LVEF of 65%. Normal right ventricular size and systolic function. Grade 2diastolic dysfunction. Left atrial enlargement The aortic root is normal in size. The mitral valve is structurally normal, mild MR. The aortic valve is trileaflet without stenosis or insufficiency. Trace physiologic TR. Inadequate TR jet to estimate PA systolic pressure No significant pericardial effusion.        < end of copied text >    Radiology:           Cardiology

## 2020-01-05 NOTE — PROGRESS NOTE ADULT - PROBLEM SELECTOR PLAN 1
cont lantus 15 units 2x/day   cont humalog 9 units 3x/day before meals  cont mod dose humalog scale coverage qac/qhs  cont cons cho diet  goal bg 100-180 in hosp setting

## 2020-01-05 NOTE — PROGRESS NOTE ADULT - ASSESSMENT
48 yo male with PMHx significant of CAD s/p BMS to RCA (8/2019), RUL subsegmental PE (6/2019, on Eliquis), hx of NSTEMI and PAF s/p ex-lap omentopexy and plication for perforated antral ulcer (5/2019), osteomyelitis s/p debridement, CVA s/p tPA (8/2019), HTN and DM2 (not on insulin) presenting to the ED with right sided weakness, facial droop, and slurred speech.    Hx CVA  - MR head negative.   - EEG unremarkable   - Continue Plavix and high-intensity statin  - Neuro following    Afib  -has maintained NSR   - Amiodarone was previously discontinued as it may have been causing his thyroid issues.  - Continue with Lopressor BID with hold parameters  - Continue with ELiquis BID  - Echo 12/24 as delineated above revealing severe LVH, normal systolic function ef 65% grade 2 diastolic dysfunction, mild MR, trace TR     CAD s/p PCI  - No symptoms suggestive of ischemia  - Continue Plavix 75 QD  - Continue Statin  - Continue Imdur  - Continue ARB and beta blocker     Awaiting D/C to [facility]- tentatively scheduled for tomorrow     Yissel Alvarez FNP-C  Cardiology NP  [id number] [phone]

## 2020-01-06 PROCEDURE — 99232 SBSQ HOSP IP/OBS MODERATE 35: CPT

## 2020-01-06 RX ORDER — INSULIN LISPRO 100/ML
7 VIAL (ML) SUBCUTANEOUS
Refills: 0 | Status: DISCONTINUED | OUTPATIENT
Start: 2020-01-06 | End: 2020-01-08

## 2020-01-06 RX ORDER — INSULIN GLARGINE 100 [IU]/ML
12 INJECTION, SOLUTION SUBCUTANEOUS
Refills: 0 | Status: DISCONTINUED | OUTPATIENT
Start: 2020-01-06 | End: 2020-01-07

## 2020-01-06 RX ORDER — INSULIN GLARGINE 100 [IU]/ML
15 INJECTION, SOLUTION SUBCUTANEOUS
Qty: 0 | Refills: 0 | DISCHARGE

## 2020-01-06 RX ADMIN — INSULIN GLARGINE 12 UNIT(S): 100 INJECTION, SOLUTION SUBCUTANEOUS at 21:53

## 2020-01-06 RX ADMIN — Medication 25 MILLIGRAM(S): at 06:37

## 2020-01-06 RX ADMIN — Medication 9 UNIT(S): at 07:59

## 2020-01-06 RX ADMIN — TAMSULOSIN HYDROCHLORIDE 0.4 MILLIGRAM(S): 0.4 CAPSULE ORAL at 21:53

## 2020-01-06 RX ADMIN — INSULIN GLARGINE 15 UNIT(S): 100 INJECTION, SOLUTION SUBCUTANEOUS at 08:00

## 2020-01-06 RX ADMIN — CLOPIDOGREL BISULFATE 75 MILLIGRAM(S): 75 TABLET, FILM COATED ORAL at 12:17

## 2020-01-06 RX ADMIN — Medication 25 MILLIGRAM(S): at 21:53

## 2020-01-06 RX ADMIN — Medication 7 UNIT(S): at 12:30

## 2020-01-06 RX ADMIN — ISOSORBIDE MONONITRATE 30 MILLIGRAM(S): 60 TABLET, EXTENDED RELEASE ORAL at 12:17

## 2020-01-06 RX ADMIN — PANTOPRAZOLE SODIUM 40 MILLIGRAM(S): 20 TABLET, DELAYED RELEASE ORAL at 06:37

## 2020-01-06 RX ADMIN — ATORVASTATIN CALCIUM 80 MILLIGRAM(S): 80 TABLET, FILM COATED ORAL at 21:53

## 2020-01-06 RX ADMIN — Medication 1 GRAM(S): at 18:10

## 2020-01-06 RX ADMIN — APIXABAN 5 MILLIGRAM(S): 2.5 TABLET, FILM COATED ORAL at 06:37

## 2020-01-06 RX ADMIN — LOSARTAN POTASSIUM 25 MILLIGRAM(S): 100 TABLET, FILM COATED ORAL at 06:37

## 2020-01-06 RX ADMIN — APIXABAN 5 MILLIGRAM(S): 2.5 TABLET, FILM COATED ORAL at 18:10

## 2020-01-06 RX ADMIN — Medication 1 GRAM(S): at 12:17

## 2020-01-06 RX ADMIN — Medication 1 GRAM(S): at 06:37

## 2020-01-06 RX ADMIN — Medication 25 MILLIGRAM(S): at 13:18

## 2020-01-06 RX ADMIN — Medication 5 MILLIGRAM(S): at 06:37

## 2020-01-06 RX ADMIN — Medication 7 UNIT(S): at 17:08

## 2020-01-06 NOTE — PROGRESS NOTE ADULT - SUBJECTIVE AND OBJECTIVE BOX
CAPILLARY BLOOD GLUCOSE      POCT Blood Glucose.: 136 mg/dL (05 Jan 2020 21:38)  POCT Blood Glucose.: 137 mg/dL (05 Jan 2020 16:48)  POCT Blood Glucose.: 133 mg/dL (05 Jan 2020 12:12)  POCT Blood Glucose.: 106 mg/dL (05 Jan 2020 07:51)      Vital Signs Last 24 Hrs  T(C): 36.6 (06 Jan 2020 05:54), Max: 37 (05 Jan 2020 13:17)  T(F): 97.8 (06 Jan 2020 05:54), Max: 98.6 (05 Jan 2020 13:17)  HR: 66 (06 Jan 2020 05:54) (61 - 66)  BP: 122/72 (06 Jan 2020 05:54) (116/74 - 127/77)  BP(mean): --  RR: 17 (06 Jan 2020 05:54) (17 - 17)  SpO2: 97% (06 Jan 2020 05:54) (96% - 99%)    General: WN/WD NAD  Respiratory: CTA B/L  CV: RRR, S1S2, no murmurs, rubs or gallops  Abdominal: Soft, NT, ND +BS, Last BM  Extremities: No edema, + peripheral pulses             atorvastatin 80 milliGRAM(s) Oral at bedtime  dextrose 40% Gel 15 Gram(s) Oral once PRN  dextrose 50% Injectable 12.5 Gram(s) IV Push once  dextrose 50% Injectable 25 Gram(s) IV Push once  dextrose 50% Injectable 25 Gram(s) IV Push once  glucagon  Injectable 1 milliGRAM(s) IntraMuscular once PRN  insulin glargine Injectable (LANTUS) 15 Unit(s) SubCutaneous two times a day  insulin lispro (HumaLOG) corrective regimen sliding scale   SubCutaneous three times a day before meals  insulin lispro (HumaLOG) corrective regimen sliding scale   SubCutaneous at bedtime  insulin lispro Injectable (HumaLOG) 9 Unit(s) SubCutaneous three times a day before meals  methimazole 5 milliGRAM(s) Oral daily  predniSONE   Tablet 5 milliGRAM(s) Oral daily

## 2020-01-06 NOTE — PROGRESS NOTE ADULT - PROBLEM SELECTOR PLAN 1
Patient examined and evaluated  s/p right hallux nail avulsion   Right hallux was redressed with dsd  Patient is stable for d/c per podiatry standpoint    Wound Care instructions  1) remove dressing and cleanse wound with normal saline and then pat dry  2) Apply bacitracin to nail bed and then apply a bandaid  3) Change dressing daily and monitor for signs of infection  4) Patient to f/u with Dr Hale or Perez within 1 week of d/c

## 2020-01-06 NOTE — PROVIDER CONTACT NOTE (OTHER) - ACTION/TREATMENT ORDERED:
Case discussed with Perlman  Lantus decreased to 12 units BID  Humalog 7 unit AC x3  Goal 100-180 mg/dL

## 2020-01-06 NOTE — PROGRESS NOTE ADULT - SUBJECTIVE AND OBJECTIVE BOX
[facility]    Bolivar Carbajal, El, Brittany, Zahra, Reynaldo, Román      [phone]    CHIEF COMPLAINT: Patient is a 49y old  Male who presents with a chief complaint of R sided numbness (05 Jan 2020 09:28)      Follow Up: cad, paf, pe    Interim history: The patient reports no new symptoms.  Denies chest discomfort and shortness of breath.  No abdominal pain.  No new neurologic symptoms.      MEDICATIONS  (STANDING):  apixaban 5 milliGRAM(s) Oral every 12 hours  atorvastatin 80 milliGRAM(s) Oral at bedtime  bethanechol 25 milliGRAM(s) Oral three times a day  clopidogrel Tablet 75 milliGRAM(s) Oral daily  dextrose 5%. 1000 milliLiter(s) (50 mL/Hr) IV Continuous <Continuous>  dextrose 50% Injectable 12.5 Gram(s) IV Push once  dextrose 50% Injectable 25 Gram(s) IV Push once  dextrose 50% Injectable 25 Gram(s) IV Push once  insulin glargine Injectable (LANTUS) 15 Unit(s) SubCutaneous two times a day  insulin lispro (HumaLOG) corrective regimen sliding scale   SubCutaneous three times a day before meals  insulin lispro (HumaLOG) corrective regimen sliding scale   SubCutaneous at bedtime  insulin lispro Injectable (HumaLOG) 9 Unit(s) SubCutaneous three times a day before meals  isosorbide   mononitrate ER Tablet (IMDUR) 30 milliGRAM(s) Oral daily  losartan 25 milliGRAM(s) Oral daily  methimazole 5 milliGRAM(s) Oral daily  metoprolol tartrate 25 milliGRAM(s) Oral every 12 hours  pantoprazole    Tablet 40 milliGRAM(s) Oral before breakfast  predniSONE   Tablet 5 milliGRAM(s) Oral daily  sucralfate 1 Gram(s) Oral four times a day  tamsulosin 0.4 milliGRAM(s) Oral at bedtime    MEDICATIONS  (PRN):  acetaminophen   Tablet .. 650 milliGRAM(s) Oral every 6 hours PRN Temp greater or equal to 38C (100.4F), Mild Pain (1 - 3)  dextrose 40% Gel 15 Gram(s) Oral once PRN Blood Glucose LESS THAN 70 milliGRAM(s)/deciLiter  glucagon  Injectable 1 milliGRAM(s) IntraMuscular once PRN Glucose <70 milliGRAM(s)/deciLiter      REVIEW OF SYSTEMS:  eye, ent, GI, , allergic, dermatologic, musculoskeletal and neurologic are negative except as described above    Vital Signs Last 24 Hrs  T(C): 36.6 (06 Jan 2020 05:54), Max: 37 (05 Jan 2020 13:17)  T(F): 97.8 (06 Jan 2020 05:54), Max: 98.6 (05 Jan 2020 13:17)  HR: 66 (06 Jan 2020 05:54) (61 - 66)  BP: 122/72 (06 Jan 2020 05:54) (116/74 - 127/77)  BP(mean): --  RR: 17 (06 Jan 2020 05:54) (17 - 17)  SpO2: 97% (06 Jan 2020 05:54) (96% - 99%)    I&O's Summary    04 Jan 2020 07:01  -  05 Jan 2020 07:00  --------------------------------------------------------  IN: 480 mL / OUT: 5150 mL / NET: -4670 mL    05 Jan 2020 07:01  -  06 Jan 2020 06:18  --------------------------------------------------------  IN: 600 mL / OUT: 2200 mL / NET: -1600 mL        Telemetry past 24h:    PHYSICAL EXAM:    Constitutional: well-nourished, well-developed, NAD   HEENT:  MMM, sclerae anicteric, conjunctivae clear, no oral cyanosis.  Pulmonary: Non-labored, breath sounds are clear bilaterally, No wheezing, rales or rhonchi  Cardiovascular: Regular, S1 and S2.  No murmur.  No rubs, gallops or clicks  Gastrointestinal: Bowel Sounds present, soft, nontender.   Lymph: No peripheral edema.   Neurological: Alert, no focal deficits  Skin: No rashes.  Psych:  Mood & affect appropriate    LABS: All Labs Reviewed:                Blood Culture:         RADIOLOGY:    EKG:    Echo:    < from: TTE Echo Doppler w/o Cont (12.24.19 @ 13:55) >     EXAM:  ECHO TTE WO CON COMP W DOPPLR         PROCEDURE DATE:  12/24/2019        INTERPRETATION:  INDICATION: Coronary artery disease    Blood Pressure 117/71    Height 187     Weight 93       BSA 2.2    Dimensions:    LA 4.2       Normal Values: 2.0 - 4.0 cm    Ao 4.0        Normal Values: 2.0 - 3.8 cm  SEPTUM 1.6       Normal Values: 0.6 - 1.2 cm  PWT 1.6       Normal Values: 0.6 - 1.1 cm  LVIDd 5.8         Normal Values: 3.0 - 5.6 cm  LVIDs 3.2         Normal Values: 1.8 - 4.0 cm    Derived Variables:  LVMI g/m2  RWT  Fractional Short  Ejection Fraction    Doppler Peak v. AoV= (m/sec)    OBSERVATIONS:    Mitral Valve: normal, mild MR.  Aortic Valve/Aorta: normal trileaflet aortic valve.  Tricuspid Valve: normal with trace TR.  Pulmonic Valve: normal  Left Atrium: Enlarged  Right Atrium: normal  Left Ventricle: Severe concentric LVH with normal systolic function, estimated LVEF of 65%.  Right Ventricle: normal size and systolic function.  Pericardium/Pleura: no significant pleural effusion, no significant pericardial effusion.  Pulmonary/RV Pressure: Inadequate TR jet to estimate PA systolic pressure  LV Diastolic Function: Grade 2diastolic dysfunction.    Severe concentric LVH, and mild LV enlargement, with normal systolic function, estimated LVEF of 65%. Normal right ventricular size and systolic function. Grade 2diastolic dysfunction. Left atrial enlargement The aortic root is normal in size. The mitral valve is structurally normal, mild MR. The aortic valve is trileaflet without stenosis or insufficiency. Trace physiologic TR. Inadequate TR jet to estimate PA systolic pressure No significant pericardial effusion.                  JOVANNI ALVAREZ M.D., ATTENDING CARDIOLOGIST  This document has been electronically signed. Dec 25 2019 11:45AM                < end of copied text >

## 2020-01-06 NOTE — PROGRESS NOTE ADULT - SUBJECTIVE AND OBJECTIVE BOX
HPI:  Patient is a 48 y/o diabetic M who was seen at bedside and is s/p right hallux nail avulsion (DOS: 1/3/20). Patient denies any pain to the right foot and denies any over night events. Patient denies n/v/f/c.      PAST MEDICAL & SURGICAL HISTORY:  Cerebrovascular accident  CAD S/P percutaneous coronary angioplasty  Osteomyelitis: s/p debridement  History of non-ST elevation myocardial infarction (NSTEMI)  Pulmonary embolism  Perforated gastric ulcer: s/p emergent ex-lap omentopexy and plication 6/2019  BPH (benign prostatic hyperplasia)  Hypertension  Afib  Diabetes mellitus with no complication  Diabetes  Traumatic amputation of left foot, initial encounter  Perforated gastric ulcer  H/O abdominal surgery    Allergies  fish (Hives)  No Known Drug Allergies    MEDICATIONS  (STANDING):  apixaban 5 milliGRAM(s) Oral every 12 hours  atorvastatin 80 milliGRAM(s) Oral at bedtime  bethanechol 25 milliGRAM(s) Oral three times a day  clopidogrel Tablet 75 milliGRAM(s) Oral daily  dextrose 5%. 1000 milliLiter(s) (50 mL/Hr) IV Continuous <Continuous>  dextrose 50% Injectable 12.5 Gram(s) IV Push once  dextrose 50% Injectable 25 Gram(s) IV Push once  dextrose 50% Injectable 25 Gram(s) IV Push once  insulin glargine Injectable (LANTUS) 15 Unit(s) SubCutaneous two times a day  insulin lispro (HumaLOG) corrective regimen sliding scale   SubCutaneous three times a day before meals  insulin lispro (HumaLOG) corrective regimen sliding scale   SubCutaneous at bedtime  insulin lispro Injectable (HumaLOG) 9 Unit(s) SubCutaneous three times a day before meals  isosorbide   mononitrate ER Tablet (IMDUR) 30 milliGRAM(s) Oral daily  losartan 25 milliGRAM(s) Oral daily  methimazole 5 milliGRAM(s) Oral daily  metoprolol tartrate 25 milliGRAM(s) Oral every 12 hours  pantoprazole    Tablet 40 milliGRAM(s) Oral before breakfast  predniSONE   Tablet 5 milliGRAM(s) Oral daily  sucralfate 1 Gram(s) Oral four times a day  tamsulosin 0.4 milliGRAM(s) Oral at bedtime    MEDICATIONS  (PRN):  acetaminophen   Tablet .. 650 milliGRAM(s) Oral every 6 hours PRN Temp greater or equal to 38C (100.4F), Mild Pain (1 - 3)  dextrose 40% Gel 15 Gram(s) Oral once PRN Blood Glucose LESS THAN 70 milliGRAM(s)/deciLiter  glucagon  Injectable 1 milliGRAM(s) IntraMuscular once PRN Glucose <70 milliGRAM(s)/deciLiter    FAMILY HISTORY:  FH: stroke: Father  FH: coronary artery disease: Father  FH: pulmonary embolism: Mother    Vital Signs Last 24 Hrs  T(C): 36.6 (06 Jan 2020 05:54), Max: 37 (05 Jan 2020 13:17)  T(F): 97.8 (06 Jan 2020 05:54), Max: 98.6 (05 Jan 2020 13:17)  HR: 66 (06 Jan 2020 05:54) (61 - 66)  BP: 122/72 (06 Jan 2020 05:54) (116/74 - 127/77)  BP(mean): --  RR: 17 (06 Jan 2020 05:54) (17 - 17)  SpO2: 97% (06 Jan 2020 05:54) (96% - 99%)    PHYSICAL EXAM:  Vascular: DP & PT palpable bilaterally, Capillary refill 3 seconds, Digital hair present bilaterally  Neurological: Light touch sensation not intact bilaterally  Musculoskeletal: 5/5 strength in all quadrants bilaterally, AJ & STJ ROM intact, left partial 1st ray amputation  Dermatological: s/p right hallux nail avulsion with a clean and intact nail bed with no openings, no probing, no erythema, no edema, no malodor and no clinical signs of infection.

## 2020-01-06 NOTE — PROGRESS NOTE ADULT - SUBJECTIVE AND OBJECTIVE BOX
Patient is a 49y old  Male who presents with a chief complaint of R sided numbness (06 Jan 2020 10:49)      INTERVAL HPI/OVERNIGHT EVENTS: Patient seen and examined. NAD. No complaints.    Vital Signs Last 24 Hrs  T(C): 36.6 (06 Jan 2020 05:54), Max: 37 (05 Jan 2020 13:17)  T(F): 97.8 (06 Jan 2020 05:54), Max: 98.6 (05 Jan 2020 13:17)  HR: 66 (06 Jan 2020 05:54) (61 - 66)  BP: 122/72 (06 Jan 2020 05:54) (116/74 - 127/77)  BP(mean): --  RR: 17 (06 Jan 2020 05:54) (17 - 17)  SpO2: 97% (06 Jan 2020 05:54) (96% - 99%)              CAPILLARY BLOOD GLUCOSE      POCT Blood Glucose.: 77 mg/dL (06 Jan 2020 11:46)  POCT Blood Glucose.: 98 mg/dL (06 Jan 2020 07:25)  POCT Blood Glucose.: 136 mg/dL (05 Jan 2020 21:38)  POCT Blood Glucose.: 137 mg/dL (05 Jan 2020 16:48)              acetaminophen   Tablet .. 650 milliGRAM(s) Oral every 6 hours PRN  apixaban 5 milliGRAM(s) Oral every 12 hours  atorvastatin 80 milliGRAM(s) Oral at bedtime  bethanechol 25 milliGRAM(s) Oral three times a day  clopidogrel Tablet 75 milliGRAM(s) Oral daily  dextrose 40% Gel 15 Gram(s) Oral once PRN  dextrose 5%. 1000 milliLiter(s) IV Continuous <Continuous>  dextrose 50% Injectable 12.5 Gram(s) IV Push once  dextrose 50% Injectable 25 Gram(s) IV Push once  dextrose 50% Injectable 25 Gram(s) IV Push once  glucagon  Injectable 1 milliGRAM(s) IntraMuscular once PRN  insulin glargine Injectable (LANTUS) 12 Unit(s) SubCutaneous two times a day  insulin lispro (HumaLOG) corrective regimen sliding scale   SubCutaneous three times a day before meals  insulin lispro (HumaLOG) corrective regimen sliding scale   SubCutaneous at bedtime  insulin lispro Injectable (HumaLOG) 7 Unit(s) SubCutaneous three times a day before meals  isosorbide   mononitrate ER Tablet (IMDUR) 30 milliGRAM(s) Oral daily  losartan 25 milliGRAM(s) Oral daily  methimazole 5 milliGRAM(s) Oral daily  metoprolol tartrate 25 milliGRAM(s) Oral every 12 hours  pantoprazole    Tablet 40 milliGRAM(s) Oral before breakfast  predniSONE   Tablet 5 milliGRAM(s) Oral daily  sucralfate 1 Gram(s) Oral four times a day  tamsulosin 0.4 milliGRAM(s) Oral at bedtime              REVIEW OF SYSTEMS:  CONSTITUTIONAL: No fever, no weight loss, or no fatigue  NECK: No pain, no stiffness  RESPIRATORY: No cough, no wheezing, no chills, no hemoptysis, No shortness of breath  CARDIOVASCULAR: No chest pain, no palpitations, no dizziness, no leg swelling  GASTROINTESTINAL: No abdominal pain. No nausea, no vomiting, no hematemesis; No diarrhea, no constipation. No melena, no hematochezia.  GENITOURINARY: No dysuria, no frequency, no hematuria, no incontinence  NEUROLOGICAL: No headaches, no loss of strength, no numbness, no tremors  SKIN: No itching, no burning  MUSCULOSKELETAL: No joint pain, no swelling; No muscle, no back, no extremity pain  PSYCHIATRIC: No depression, no mood swings,   HEME/LYMPH: No easy bruising, no bleeding gums  ALLERY AND IMMUNOLOGIC: No hives       Consultant(s) Notes Reviewed:  [X] YES  [ ] NO    PHYSICAL EXAM:  GENERAL: NAD  HEAD:  Atraumatic, Normocephalic  EYES: EOMI, PERRLA, conjunctiva and sclera clear  ENMT: No tonsillar erythema, exudates, or enlargement; Moist mucous membranes  NECK: Supple, No JVD  NERVOUS SYSTEM:  Awake & alert  CHEST/LUNG: Clear to auscultation bilaterally; No rales, rhonchi, wheezing,  HEART: Regular rate and rhythm  ABDOMEN: Soft, Nontender, Nondistended; Bowel sounds present  EXTREMITIES:  No clubbing, cyanosis, or edema  LYMPH: No lymphadenopathy noted  SKIN: No rashes      Advanced care planning discussed with patient/family [X] YES   [ ] NO    Advanced care planning discussed with patient/family. Advanced care planning forms reviewed/discussed/completed. 20 minutes spent.

## 2020-01-06 NOTE — PROVIDER CONTACT NOTE (OTHER) - SITUATION
48 yo T2 adm weakness right upper extremity   currently on Lantus 15 units BID and Humalog 9 units AC x3

## 2020-01-06 NOTE — PROGRESS NOTE ADULT - ASSESSMENT
50 yo male with PMHx significant of CAD s/p BMS to RCA (8/2019), RUL subsegmental PE (6/2019, on Eliquis), hx of NSTEMI and PAF s/p ex-lap omentopexy and plication for perforated antral ulcer (5/2019), osteomyelitis s/p debridement, CVA s/p tPA (8/2019), HTN and DM2 (not on insulin) presenting to the ED with right sided weakness, facial droop, and slurred speech.    Hx CVA  - MR head negative.   - EEG unremarkable   - Continue Plavix and high-intensity statin  - Neuro following    Afib  -has maintained NSR   - Amiodarone was previously discontinued as it may have been causing his thyroid issues.  - Continue with Lopressor BID with hold parameters  - Continue with ELiquis BID  - Echo 12/24 as delineated above revealing severe LVH, normal systolic function ef 65% grade 2 diastolic dysfunction, mild MR, trace TR     CAD s/p PCI  - No symptoms suggestive of ischemia  - Continue Plavix 75 QD  - Continue Statin  - Continue Imdur  - Continue ARB and beta blocker     Awaiting D/C to [facility]- tentatively scheduled for today

## 2020-01-07 PROCEDURE — 99232 SBSQ HOSP IP/OBS MODERATE 35: CPT

## 2020-01-07 RX ORDER — INSULIN GLARGINE 100 [IU]/ML
12 INJECTION, SOLUTION SUBCUTANEOUS
Qty: 0 | Refills: 0 | DISCHARGE

## 2020-01-07 RX ORDER — INSULIN GLARGINE 100 [IU]/ML
10 INJECTION, SOLUTION SUBCUTANEOUS
Refills: 0 | Status: DISCONTINUED | OUTPATIENT
Start: 2020-01-07 | End: 2020-01-08

## 2020-01-07 RX ADMIN — Medication 25 MILLIGRAM(S): at 05:16

## 2020-01-07 RX ADMIN — Medication 25 MILLIGRAM(S): at 17:13

## 2020-01-07 RX ADMIN — Medication 25 MILLIGRAM(S): at 12:16

## 2020-01-07 RX ADMIN — ISOSORBIDE MONONITRATE 30 MILLIGRAM(S): 60 TABLET, EXTENDED RELEASE ORAL at 12:17

## 2020-01-07 RX ADMIN — Medication 25 MILLIGRAM(S): at 22:05

## 2020-01-07 RX ADMIN — TAMSULOSIN HYDROCHLORIDE 0.4 MILLIGRAM(S): 0.4 CAPSULE ORAL at 22:05

## 2020-01-07 RX ADMIN — ATORVASTATIN CALCIUM 80 MILLIGRAM(S): 80 TABLET, FILM COATED ORAL at 22:05

## 2020-01-07 RX ADMIN — Medication 1 GRAM(S): at 12:16

## 2020-01-07 RX ADMIN — Medication 7 UNIT(S): at 08:20

## 2020-01-07 RX ADMIN — INSULIN GLARGINE 10 UNIT(S): 100 INJECTION, SOLUTION SUBCUTANEOUS at 09:36

## 2020-01-07 RX ADMIN — Medication 1 GRAM(S): at 05:16

## 2020-01-07 RX ADMIN — PANTOPRAZOLE SODIUM 40 MILLIGRAM(S): 20 TABLET, DELAYED RELEASE ORAL at 05:16

## 2020-01-07 RX ADMIN — Medication 1 GRAM(S): at 23:34

## 2020-01-07 RX ADMIN — Medication 7 UNIT(S): at 12:17

## 2020-01-07 RX ADMIN — CLOPIDOGREL BISULFATE 75 MILLIGRAM(S): 75 TABLET, FILM COATED ORAL at 12:16

## 2020-01-07 RX ADMIN — INSULIN GLARGINE 10 UNIT(S): 100 INJECTION, SOLUTION SUBCUTANEOUS at 22:06

## 2020-01-07 RX ADMIN — APIXABAN 5 MILLIGRAM(S): 2.5 TABLET, FILM COATED ORAL at 05:16

## 2020-01-07 RX ADMIN — APIXABAN 5 MILLIGRAM(S): 2.5 TABLET, FILM COATED ORAL at 17:13

## 2020-01-07 RX ADMIN — Medication 7 UNIT(S): at 17:13

## 2020-01-07 RX ADMIN — Medication 5 MILLIGRAM(S): at 05:16

## 2020-01-07 NOTE — PROGRESS NOTE ADULT - PROBLEM SELECTOR PLAN 1
decrease lantus 10 units 2x/day  cont humalog 7 units 3x/day before meals  cont humalog scale coverage qac/qhs  goal bg 100-180 in hosp setting

## 2020-01-07 NOTE — PROGRESS NOTE ADULT - SUBJECTIVE AND OBJECTIVE BOX
HPI:  Patient is a 50 y/o diabetic M who was seen at bedside and is s/p right hallux nail avulsion (DOS: 1/3/20). Patient again denies any pain to the right foot and denies any over night events. Patient denies n/v/f/c.      PAST MEDICAL & SURGICAL HISTORY:  Cerebrovascular accident  CAD S/P percutaneous coronary angioplasty  Osteomyelitis: s/p debridement  History of non-ST elevation myocardial infarction (NSTEMI)  Pulmonary embolism  Perforated gastric ulcer: s/p emergent ex-lap omentopexy and plication 6/2019  BPH (benign prostatic hyperplasia)  Hypertension  Afib  Diabetes mellitus with no complication  Diabetes  Traumatic amputation of left foot, initial encounter  Perforated gastric ulcer  H/O abdominal surgery    Allergies  fish (Hives)  No Known Drug Allergies    MEDICATIONS  (STANDING):  apixaban 5 milliGRAM(s) Oral every 12 hours  atorvastatin 80 milliGRAM(s) Oral at bedtime  bethanechol 25 milliGRAM(s) Oral three times a day  clopidogrel Tablet 75 milliGRAM(s) Oral daily  dextrose 5%. 1000 milliLiter(s) (50 mL/Hr) IV Continuous <Continuous>  dextrose 50% Injectable 12.5 Gram(s) IV Push once  dextrose 50% Injectable 25 Gram(s) IV Push once  dextrose 50% Injectable 25 Gram(s) IV Push once  insulin glargine Injectable (LANTUS) 15 Unit(s) SubCutaneous two times a day  insulin lispro (HumaLOG) corrective regimen sliding scale   SubCutaneous three times a day before meals  insulin lispro (HumaLOG) corrective regimen sliding scale   SubCutaneous at bedtime  insulin lispro Injectable (HumaLOG) 9 Unit(s) SubCutaneous three times a day before meals  isosorbide   mononitrate ER Tablet (IMDUR) 30 milliGRAM(s) Oral daily  losartan 25 milliGRAM(s) Oral daily  methimazole 5 milliGRAM(s) Oral daily  metoprolol tartrate 25 milliGRAM(s) Oral every 12 hours  pantoprazole    Tablet 40 milliGRAM(s) Oral before breakfast  predniSONE   Tablet 5 milliGRAM(s) Oral daily  sucralfate 1 Gram(s) Oral four times a day  tamsulosin 0.4 milliGRAM(s) Oral at bedtime    MEDICATIONS  (PRN):  acetaminophen   Tablet .. 650 milliGRAM(s) Oral every 6 hours PRN Temp greater or equal to 38C (100.4F), Mild Pain (1 - 3)  dextrose 40% Gel 15 Gram(s) Oral once PRN Blood Glucose LESS THAN 70 milliGRAM(s)/deciLiter  glucagon  Injectable 1 milliGRAM(s) IntraMuscular once PRN Glucose <70 milliGRAM(s)/deciLiter    FAMILY HISTORY:  FH: stroke: Father  FH: coronary artery disease: Father  FH: pulmonary embolism: Mother    Vital Signs Last 24 Hrs  T(C): 36.8 (07 Jan 2020 05:07), Max: 36.8 (06 Jan 2020 20:17)  T(F): 98.2 (07 Jan 2020 05:07), Max: 98.3 (06 Jan 2020 20:17)  HR: 59 (07 Jan 2020 05:07) (53 - 59)  BP: 105/62 (07 Jan 2020 06:48) (99/55 - 112/61)  BP(mean): --  RR: 18 (07 Jan 2020 05:07) (16 - 18)  SpO2: 93% (07 Jan 2020 05:07) (93% - 98%)    PHYSICAL EXAM:  Vascular: DP & PT palpable bilaterally, Capillary refill 3 seconds, Digital hair present bilaterally  Neurological: Light touch sensation not intact bilaterally  Musculoskeletal: 5/5 strength in all quadrants bilaterally, AJ & STJ ROM intact, left partial 1st ray amputation  Dermatological: s/p right hallux nail avulsion with a clean and intact nail bed with no openings, no probing, no erythema, no edema, no malodor and no clinical signs of infection.

## 2020-01-07 NOTE — PROGRESS NOTE ADULT - ASSESSMENT
48 yo male with PMHx significant of CAD s/p BMS to RCA (8/2019), RUL subsegmental PE (6/2019, on Eliquis), hx of NSTEMI and PAF s/p ex-lap omentopexy and plication for perforated antral ulcer (5/2019), osteomyelitis s/p debridement, CVA s/p tPA (8/2019), HTN and DM2 (not on insulin) presenting to the ED with right sided weakness, facial droop, and slurred speech.    Hx CVA  - MR head negative.   - EEG unremarkable   - Continue Plavix and high-intensity statin  - Neuro following    Afib  -CHADSVASC elevated to at least 4  -continue eliquis 5mg po bid  - Continue with Lopressor BID with hold parameters  - Echo 12/24 as delineated above revealing severe LVH, normal systolic function ef 65% grade 2 diastolic dysfunction, mild MR, trace TR     CAD s/p PCI  - No symptoms suggestive of ischemia  - Continue Plavix 75 QD  - Continue Statin  - Continue Imdur  - Continue ARB and beta blocker     Awaiting D/C to [facility]- tentatively scheduled for today 50 yo male with PMHx significant of CAD s/p BMS to RCA (8/2019), RUL subsegmental PE (6/2019, on Eliquis), hx of NSTEMI and PAF s/p ex-lap omentopexy and plication for perforated antral ulcer (5/2019), osteomyelitis s/p debridement, CVA s/p tPA (8/2019), HTN and DM2 (not on insulin) presenting to the ED with right sided weakness, facial droop, and slurred speech.    Hx CVA  - MR head negative.   - EEG unremarkable   - Continue Plavix and high-intensity statin  - Neuro following    Afib  - CHADSVASC elevated to at least 4  - continue eliquis 5mg po bid  - Continue with Lopressor BID with hold parameters  - Echo 12/24 as delineated above revealing severe LVH, normal systolic function ef 65% grade 2 diastolic dysfunction, mild MR, trace TR     CAD s/p PCI  - No symptoms suggestive of ischemia  - Continue Plavix 75 QD  - Continue Statin  - Continue Imdur  - Continue ARB and beta blocker     Awaiting D/C to [facility]- tentatively scheduled for today

## 2020-01-07 NOTE — PROGRESS NOTE ADULT - PROBLEM SELECTOR PLAN 1
Patient examined and evaluated  s/p right hallux nail avulsion   Right hallux was redressed with aquacel and dsd  Patient is stable for d/c per podiatry standpoint    Wound Care instructions  1) remove dressing and cleanse wound with normal saline and then pat dry  2) Apply muporicin to nail bed and then apply a bandaid  3) Change dressing daily and monitor for signs of infection  4) Patient to f/u with Dr Hale or Perez within 1 week of d/c

## 2020-01-07 NOTE — PROGRESS NOTE ADULT - SUBJECTIVE AND OBJECTIVE BOX
CAPILLARY BLOOD GLUCOSE      POCT Blood Glucose.: 79 mg/dL (07 Jan 2020 07:27)  POCT Blood Glucose.: 189 mg/dL (06 Jan 2020 21:04)  POCT Blood Glucose.: 106 mg/dL (06 Jan 2020 16:59)  POCT Blood Glucose.: 77 mg/dL (06 Jan 2020 11:46)      Vital Signs Last 24 Hrs  T(C): 36.8 (07 Jan 2020 05:07), Max: 36.8 (06 Jan 2020 20:17)  T(F): 98.2 (07 Jan 2020 05:07), Max: 98.3 (06 Jan 2020 20:17)  HR: 59 (07 Jan 2020 05:07) (53 - 59)  BP: 105/62 (07 Jan 2020 06:48) (99/55 - 112/61)  BP(mean): --  RR: 18 (07 Jan 2020 05:07) (16 - 18)  SpO2: 93% (07 Jan 2020 05:07) (93% - 98%)    General: WN/WD NAD  Respiratory: CTA B/L  CV: RRR, S1S2, no murmurs, rubs or gallops  Abdominal: Soft, NT, ND +BS, Last BM  Extremities: No edema, + peripheral pulses             atorvastatin 80 milliGRAM(s) Oral at bedtime  dextrose 40% Gel 15 Gram(s) Oral once PRN  dextrose 50% Injectable 12.5 Gram(s) IV Push once  dextrose 50% Injectable 25 Gram(s) IV Push once  dextrose 50% Injectable 25 Gram(s) IV Push once  glucagon  Injectable 1 milliGRAM(s) IntraMuscular once PRN  insulin glargine Injectable (LANTUS) 10 Unit(s) SubCutaneous two times a day  insulin lispro (HumaLOG) corrective regimen sliding scale   SubCutaneous three times a day before meals  insulin lispro (HumaLOG) corrective regimen sliding scale   SubCutaneous at bedtime  insulin lispro Injectable (HumaLOG) 7 Unit(s) SubCutaneous three times a day before meals  methimazole 5 milliGRAM(s) Oral daily  predniSONE   Tablet 5 milliGRAM(s) Oral daily

## 2020-01-07 NOTE — PROGRESS NOTE ADULT - SUBJECTIVE AND OBJECTIVE BOX
FOLLOW UP:  right sided weakness  SUBJECTIVE/OBSERVATIONS:  Patient seen and examined. He is sitting in bed  with no complaints of chest pain or SOB, he reports his right leg is slightly weaker than left leg.  OVERNIGHT EVENTS: No overnight events  TELE EVENTS:   No tele  Vital Signs Last 24 Hrs  T(C): 36.8 (07 Jan 2020 05:07), Max: 36.8 (06 Jan 2020 20:17)  T(F): 98.2 (07 Jan 2020 05:07), Max: 98.3 (06 Jan 2020 20:17)  HR: 59 (07 Jan 2020 05:07) (53 - 59)  BP: 105/62 (07 Jan 2020 06:48) (99/55 - 112/61)  BP(mean): --  RR: 18 (07 Jan 2020 05:07) (16 - 18)  SpO2: 93% (07 Jan 2020 05:07) (93% - 98%)  I&O's Summary    06 Jan 2020 07:01  -  07 Jan 2020 07:00  --------------------------------------------------------  IN: 0 mL / OUT: 4000 mL / NET: -4000 mL      MEDICATIONS:  apixaban 5 milliGRAM(s) Oral every 12 hours  clopidogrel Tablet 75 milliGRAM(s) Oral daily  isosorbide   mononitrate ER Tablet (IMDUR) 30 milliGRAM(s) Oral daily  losartan 25 milliGRAM(s) Oral daily  metoprolol tartrate 25 milliGRAM(s) Oral every 12 hours  tamsulosin 0.4 milliGRAM(s) Oral at bedtime  acetaminophen   Tablet .. 650 milliGRAM(s) Oral every 6 hours PRN    pantoprazole    Tablet 40 milliGRAM(s) Oral before breakfast  sucralfate 1 Gram(s) Oral four times a day    atorvastatin 80 milliGRAM(s) Oral at bedtime  dextrose 40% Gel 15 Gram(s) Oral once PRN  dextrose 50% Injectable 12.5 Gram(s) IV Push once  dextrose 50% Injectable 25 Gram(s) IV Push once  dextrose 50% Injectable 25 Gram(s) IV Push once  glucagon  Injectable 1 milliGRAM(s) IntraMuscular once PRN  insulin glargine Injectable (LANTUS) 10 Unit(s) SubCutaneous two times a day  insulin lispro (HumaLOG) corrective regimen sliding scale   SubCutaneous three times a day before meals  insulin lispro (HumaLOG) corrective regimen sliding scale   SubCutaneous at bedtime  insulin lispro Injectable (HumaLOG) 7 Unit(s) SubCutaneous three times a day before meals  methimazole 5 milliGRAM(s) Oral daily  predniSONE   Tablet 5 milliGRAM(s) Oral daily    bethanechol 25 milliGRAM(s) Oral three times a day  dextrose 5%. 1000 milliLiter(s) IV Continuous <Continuous>      REVIEW OF SYSTEMS:  Complete 10point ROS negative.    PHYSICAL EXAM:  General: NAD  Cardiovascular: Normal S1 S2, No JVD, No murmurs, No edema  Respiratory: Lungs clear to auscultation	  Gastrointestinal:  Soft, Non-tender, + BS	  Skin: warm and dry, No rashes, No ecchymoses, No cyanosis	  Extremities: Normal range of motion, No clubbing, cyanosis or edema  Vascular: Peripheral pulses palpable 2+ bilaterally        Echo:    < from: TTE Echo Doppler w/o Cont (12.24.19 @ 13:55) >     EXAM:  ECHO TTE WO CON COMP W DOPPLR         PROCEDURE DATE:  12/24/2019        INTERPRETATION:  INDICATION: Coronary artery disease    Blood Pressure 117/71    Height 187     Weight 93       BSA 2.2    Dimensions:    LA 4.2       Normal Values: 2.0 - 4.0 cm    Ao 4.0        Normal Values: 2.0 - 3.8 cm  SEPTUM 1.6       Normal Values: 0.6 - 1.2 cm  PWT 1.6       Normal Values: 0.6 - 1.1 cm  LVIDd 5.8         Normal Values: 3.0 - 5.6 cm  LVIDs 3.2         Normal Values: 1.8 - 4.0 cm    Derived Variables:  LVMI g/m2  RWT  Fractional Short  Ejection Fraction    Doppler Peak v. AoV= (m/sec)    OBSERVATIONS:    Mitral Valve: normal, mild MR.  Aortic Valve/Aorta: normal trileaflet aortic valve.  Tricuspid Valve: normal with trace TR.  Pulmonic Valve: normal  Left Atrium: Enlarged  Right Atrium: normal  Left Ventricle: Severe concentric LVH with normal systolic function, estimated LVEF of 65%.  Right Ventricle: normal size and systolic function.  Pericardium/Pleura: no significant pleural effusion, no significant pericardial effusion.  Pulmonary/RV Pressure: Inadequate TR jet to estimate PA systolic pressure  LV Diastolic Function: Grade 2diastolic dysfunction.    Severe concentric LVH, and mild LV enlargement, with normal systolic function, estimated LVEF of 65%. Normal right ventricular size and systolic function. Grade 2diastolic dysfunction. Left atrial enlargement The aortic root is normal in size. The mitral valve is structurally normal, mild MR. The aortic valve is trileaflet without stenosis or insufficiency. Trace physiologic TR. Inadequate TR jet to estimate PA systolic pressure No significant pericardial effusion.                  JOVANNI ALVAREZ M.D., ATTENDING CARDIOLOGIST  This document has been electronically signed. Dec 25 2019 11:45AM

## 2020-01-08 ENCOUNTER — TRANSCRIPTION ENCOUNTER (OUTPATIENT)
Age: 50
End: 2020-01-08

## 2020-01-08 VITALS
SYSTOLIC BLOOD PRESSURE: 125 MMHG | DIASTOLIC BLOOD PRESSURE: 76 MMHG | TEMPERATURE: 98 F | HEART RATE: 60 BPM | WEIGHT: 213.19 LBS | OXYGEN SATURATION: 93 % | RESPIRATION RATE: 17 BRPM

## 2020-01-08 PROCEDURE — 97161 PT EVAL LOW COMPLEX 20 MIN: CPT

## 2020-01-08 PROCEDURE — 85027 COMPLETE CBC AUTOMATED: CPT

## 2020-01-08 PROCEDURE — 70496 CT ANGIOGRAPHY HEAD: CPT

## 2020-01-08 PROCEDURE — 97116 GAIT TRAINING THERAPY: CPT

## 2020-01-08 PROCEDURE — 97530 THERAPEUTIC ACTIVITIES: CPT

## 2020-01-08 PROCEDURE — 99285 EMERGENCY DEPT VISIT HI MDM: CPT | Mod: 25

## 2020-01-08 PROCEDURE — 83735 ASSAY OF MAGNESIUM: CPT

## 2020-01-08 PROCEDURE — 85730 THROMBOPLASTIN TIME PARTIAL: CPT

## 2020-01-08 PROCEDURE — 80053 COMPREHEN METABOLIC PANEL: CPT

## 2020-01-08 PROCEDURE — 80048 BASIC METABOLIC PNL TOTAL CA: CPT

## 2020-01-08 PROCEDURE — 80061 LIPID PANEL: CPT

## 2020-01-08 PROCEDURE — 70498 CT ANGIOGRAPHY NECK: CPT

## 2020-01-08 PROCEDURE — 88304 TISSUE EXAM BY PATHOLOGIST: CPT

## 2020-01-08 PROCEDURE — 82962 GLUCOSE BLOOD TEST: CPT

## 2020-01-08 PROCEDURE — 93005 ELECTROCARDIOGRAM TRACING: CPT

## 2020-01-08 PROCEDURE — 84439 ASSAY OF FREE THYROXINE: CPT

## 2020-01-08 PROCEDURE — 93306 TTE W/DOPPLER COMPLETE: CPT

## 2020-01-08 PROCEDURE — 71045 X-RAY EXAM CHEST 1 VIEW: CPT

## 2020-01-08 PROCEDURE — 97112 NEUROMUSCULAR REEDUCATION: CPT

## 2020-01-08 PROCEDURE — 70450 CT HEAD/BRAIN W/O DYE: CPT

## 2020-01-08 PROCEDURE — 99232 SBSQ HOSP IP/OBS MODERATE 35: CPT

## 2020-01-08 PROCEDURE — 85610 PROTHROMBIN TIME: CPT

## 2020-01-08 PROCEDURE — 84481 FREE ASSAY (FT-3): CPT

## 2020-01-08 PROCEDURE — 88312 SPECIAL STAINS GROUP 1: CPT

## 2020-01-08 PROCEDURE — 70551 MRI BRAIN STEM W/O DYE: CPT

## 2020-01-08 PROCEDURE — 92610 EVALUATE SWALLOWING FUNCTION: CPT

## 2020-01-08 PROCEDURE — 83036 HEMOGLOBIN GLYCOSYLATED A1C: CPT

## 2020-01-08 PROCEDURE — 36415 COLL VENOUS BLD VENIPUNCTURE: CPT

## 2020-01-08 PROCEDURE — 80307 DRUG TEST PRSMV CHEM ANLYZR: CPT

## 2020-01-08 PROCEDURE — 84443 ASSAY THYROID STIM HORMONE: CPT

## 2020-01-08 RX ORDER — INSULIN LISPRO 100/ML
5 VIAL (ML) SUBCUTANEOUS
Refills: 0 | Status: DISCONTINUED | OUTPATIENT
Start: 2020-01-08 | End: 2020-01-08

## 2020-01-08 RX ORDER — INSULIN GLARGINE 100 [IU]/ML
10 INJECTION, SOLUTION SUBCUTANEOUS
Qty: 0 | Refills: 0 | DISCHARGE

## 2020-01-08 RX ORDER — INSULIN GLARGINE 100 [IU]/ML
10 INJECTION, SOLUTION SUBCUTANEOUS EVERY MORNING
Refills: 0 | Status: DISCONTINUED | OUTPATIENT
Start: 2020-01-08 | End: 2020-01-08

## 2020-01-08 RX ADMIN — INSULIN GLARGINE 10 UNIT(S): 100 INJECTION, SOLUTION SUBCUTANEOUS at 08:06

## 2020-01-08 RX ADMIN — LOSARTAN POTASSIUM 25 MILLIGRAM(S): 100 TABLET, FILM COATED ORAL at 05:21

## 2020-01-08 RX ADMIN — ISOSORBIDE MONONITRATE 30 MILLIGRAM(S): 60 TABLET, EXTENDED RELEASE ORAL at 12:38

## 2020-01-08 RX ADMIN — Medication 25 MILLIGRAM(S): at 05:21

## 2020-01-08 RX ADMIN — Medication 4: at 12:37

## 2020-01-08 RX ADMIN — Medication 1 GRAM(S): at 12:38

## 2020-01-08 RX ADMIN — APIXABAN 5 MILLIGRAM(S): 2.5 TABLET, FILM COATED ORAL at 05:21

## 2020-01-08 RX ADMIN — PANTOPRAZOLE SODIUM 40 MILLIGRAM(S): 20 TABLET, DELAYED RELEASE ORAL at 05:21

## 2020-01-08 RX ADMIN — Medication 1 GRAM(S): at 05:21

## 2020-01-08 RX ADMIN — CLOPIDOGREL BISULFATE 75 MILLIGRAM(S): 75 TABLET, FILM COATED ORAL at 12:37

## 2020-01-08 RX ADMIN — Medication 5 UNIT(S): at 12:37

## 2020-01-08 RX ADMIN — Medication 5 MILLIGRAM(S): at 05:21

## 2020-01-08 NOTE — PROGRESS NOTE ADULT - ATTENDING COMMENTS
Chart reviewed     Patient seen and examined    Agree with plan as outlined above
Chart reviewed    Patient seen and examined    Agree with plans outlined above
I personally saw and examined the patient in detail.  I have spoken to the above provider regarding the assessment and plan of care.  I reviewed the above assessment and plan of care, and agree.  I have made changes in the body of the note where appropriate.
I saw and examined the patient personally. Spoke with above provider regarding this case. I reviewed the above findings completely.  I agree with the above history, physical, and plan which I have edited where appropriate.
I saw and examined the patient personally. Spoke with above provider regarding this case. I reviewed the above findings completely.  I agree with the above history, physical, and plan which I have edited where appropriate.
The patient was personally seen and examined, in addition to being examined and evaluated by NP.  All elements of the note were edited where appropriate.
I saw and examined the patient personally. Spoke with above provider regarding this case. I reviewed the above findings completely.  I agree with the above history, physical, and plan which I have edited where appropriate.
Seen/examined. agree with above.
Seen/examined. agree with above.
The patient was personally seen and examined, in addition to being examined and evaluated by NP.  All elements of the note were edited where appropriate.
D/C planning

## 2020-01-08 NOTE — PROGRESS NOTE ADULT - PROBLEM SELECTOR PLAN 1
decrease lantus 10 units am 1x/day  decrease humalog 5 units 3x/day before meals  cont humalog scale coverage qac/qhs  cont cons cho diet  goal bg 100-180 in hosp setting

## 2020-01-08 NOTE — PROGRESS NOTE ADULT - PROBLEM SELECTOR PROBLEM 1
Diabetes
Diabetes mellitus with no complication
Weakness of right upper extremity
Onycholysis of toenail

## 2020-01-08 NOTE — PROGRESS NOTE ADULT - REASON FOR ADMISSION
R sided numbness

## 2020-01-08 NOTE — PROGRESS NOTE ADULT - SUBJECTIVE AND OBJECTIVE BOX
CAPILLARY BLOOD GLUCOSE      POCT Blood Glucose.: 73 mg/dL (08 Jan 2020 07:36)  POCT Blood Glucose.: 115 mg/dL (07 Jan 2020 21:16)  POCT Blood Glucose.: 97 mg/dL (07 Jan 2020 17:04)  POCT Blood Glucose.: 129 mg/dL (07 Jan 2020 12:05)      Vital Signs Last 24 Hrs  T(C): 36.5 (08 Jan 2020 04:20), Max: 37.1 (07 Jan 2020 20:42)  T(F): 97.7 (08 Jan 2020 04:20), Max: 98.7 (07 Jan 2020 20:42)  HR: 60 (08 Jan 2020 04:20) (51 - 69)  BP: 125/76 (08 Jan 2020 04:20) (117/70 - 149/80)  BP(mean): --  RR: 17 (08 Jan 2020 04:20) (15 - 17)  SpO2: 93% (08 Jan 2020 04:20) (93% - 98%)    General: WN/WD NAD  Respiratory: CTA B/L  CV: RRR, S1S2, no murmurs, rubs or gallops  Abdominal: Soft, NT, ND +BS, Last BM  Extremities: No edema, + peripheral pulses             atorvastatin 80 milliGRAM(s) Oral at bedtime  dextrose 40% Gel 15 Gram(s) Oral once PRN  dextrose 50% Injectable 12.5 Gram(s) IV Push once  dextrose 50% Injectable 25 Gram(s) IV Push once  dextrose 50% Injectable 25 Gram(s) IV Push once  glucagon  Injectable 1 milliGRAM(s) IntraMuscular once PRN  insulin glargine Injectable (LANTUS) 10 Unit(s) SubCutaneous two times a day  insulin lispro (HumaLOG) corrective regimen sliding scale   SubCutaneous three times a day before meals  insulin lispro (HumaLOG) corrective regimen sliding scale   SubCutaneous at bedtime  insulin lispro Injectable (HumaLOG) 7 Unit(s) SubCutaneous three times a day before meals  methimazole 5 milliGRAM(s) Oral daily  predniSONE   Tablet 5 milliGRAM(s) Oral daily

## 2020-01-08 NOTE — PROGRESS NOTE ADULT - SUBJECTIVE AND OBJECTIVE BOX
[facility] -- Bolivar Carbajal, El, Brittany, Reynaldo Sweeney Savella  Office # [phone]      Follow Up:      cad  Subjective/Observations:     No events overnight resting comfortably in bed.  No complaints of chest pain, dyspnea, or palpitations reported. No signs of orthopnea or PND.    REVIEW OF SYSTEMS: All other review of systems is negative unless indicated above    PAST MEDICAL & SURGICAL HISTORY:  Cerebrovascular accident  CAD S/P percutaneous coronary angioplasty  Osteomyelitis: s/p debridement  History of non-ST elevation myocardial infarction (NSTEMI)  Pulmonary embolism  Perforated gastric ulcer: s/p emergent ex-lap omentopexy and plication 6/2019  BPH (benign prostatic hyperplasia)  Hypertension  Afib  Diabetes mellitus with no complication  Diabetes  Traumatic amputation of left foot, initial encounter  Perforated gastric ulcer  H/O abdominal surgery      MEDICATIONS  (STANDING):  apixaban 5 milliGRAM(s) Oral every 12 hours  atorvastatin 80 milliGRAM(s) Oral at bedtime  bethanechol 25 milliGRAM(s) Oral three times a day  clopidogrel Tablet 75 milliGRAM(s) Oral daily  dextrose 5%. 1000 milliLiter(s) (50 mL/Hr) IV Continuous <Continuous>  dextrose 50% Injectable 12.5 Gram(s) IV Push once  dextrose 50% Injectable 25 Gram(s) IV Push once  dextrose 50% Injectable 25 Gram(s) IV Push once  insulin glargine Injectable (LANTUS) 10 Unit(s) SubCutaneous two times a day  insulin lispro (HumaLOG) corrective regimen sliding scale   SubCutaneous three times a day before meals  insulin lispro (HumaLOG) corrective regimen sliding scale   SubCutaneous at bedtime  insulin lispro Injectable (HumaLOG) 7 Unit(s) SubCutaneous three times a day before meals  isosorbide   mononitrate ER Tablet (IMDUR) 30 milliGRAM(s) Oral daily  losartan 25 milliGRAM(s) Oral daily  methimazole 5 milliGRAM(s) Oral daily  metoprolol tartrate 25 milliGRAM(s) Oral every 12 hours  pantoprazole    Tablet 40 milliGRAM(s) Oral before breakfast  predniSONE   Tablet 5 milliGRAM(s) Oral daily  sucralfate 1 Gram(s) Oral four times a day  tamsulosin 0.4 milliGRAM(s) Oral at bedtime    MEDICATIONS  (PRN):  acetaminophen   Tablet .. 650 milliGRAM(s) Oral every 6 hours PRN Temp greater or equal to 38C (100.4F), Mild Pain (1 - 3)  dextrose 40% Gel 15 Gram(s) Oral once PRN Blood Glucose LESS THAN 70 milliGRAM(s)/deciLiter  glucagon  Injectable 1 milliGRAM(s) IntraMuscular once PRN Glucose <70 milliGRAM(s)/deciLiter      Allergies    fish (Hives)  No Known Drug Allergies    Intolerances        Vital Signs Last 24 Hrs  T(C): 36.5 (08 Jan 2020 04:20), Max: 37.1 (07 Jan 2020 20:42)  T(F): 97.7 (08 Jan 2020 04:20), Max: 98.7 (07 Jan 2020 20:42)  HR: 60 (08 Jan 2020 04:20) (51 - 69)  BP: 125/76 (08 Jan 2020 04:20) (117/70 - 149/80)  BP(mean): --  RR: 17 (08 Jan 2020 04:20) (15 - 17)  SpO2: 93% (08 Jan 2020 04:20) (93% - 98%)    I&O's Summary    07 Jan 2020 07:01  -  08 Jan 2020 07:00  --------------------------------------------------------  IN: 0 mL / OUT: 3600 mL / NET: -3600 mL          PHYSICAL EXAM:  TELE: not on tele   Constitutional: NAD, awake and alert, well-developed  HEENT: Moist Mucous Membranes, Anicteric  Pulmonary: Non-labored, breath sounds are clear bilaterally, No wheezing, crackles or rhonchi  Cardiovascular: Regular, S1 and S2 nl, No murmurs, rubs, gallops or clicks  Gastrointestinal: Bowel Sounds present, soft, nontender.   Lymph: No lymphadenopathy. No peripheral edema.  Skin: No visible rashes or ulcers.  Psych:  Mood & affect appropriate    LABS: All Labs Reviewed:                   ECG:  < from: 12 Lead ECG (12.23.19 @ 15:36) >    Ventricular Rate 86 BPM    Atrial Rate 86 BPM    P-R Interval 152 ms    QRS Duration 86 ms    Q-T Interval 370 ms    QTC Calculation(Bezet) 442 ms    P Axis 35 degrees    R Axis 6 degrees    T Axis -16 degrees    Diagnosis Line Normal sinus rhythm  Nonspecific ST abnormality    < end of copied text >      Echo:  < from: TTE Echo Doppler w/o Cont (12.24.19 @ 13:55) >    Mitral Valve: normal, mild MR.  Aortic Valve/Aorta: normal trileaflet aortic valve.  Tricuspid Valve: normal with trace TR.  Pulmonic Valve: normal  Left Atrium: Enlarged  Right Atrium: normal  Left Ventricle: Severe concentric LVH with normal systolic function, estimated LVEF of 65%.  Right Ventricle: normal size and systolic function.  Pericardium/Pleura: no significant pleural effusion, no significant pericardial effusion.  Pulmonary/RV Pressure: Inadequate TR jet to estimate PA systolic pressure  LV Diastolic Function: Grade 2diastolic dysfunction.    Severe concentric LVH, and mild LV enlargement, with normal systolic function, estimated LVEF of 65%. Normal right ventricular size and systolic function. Grade 2diastolic dysfunction. Left atrial enlargement The aortic root is normal in size. The mitral valve is structurally normal, mild MR. The aortic valve is trileaflet without stenosis or insufficiency. Trace physiologic TR. Inadequate TR jet to estimate PA systolic pressure No significant pericardial effusion.        < end of copied text >    Radiology:  < from: Xray Chest 1 View AP/PA (12.23.19 @ 16:16) >    Cardiac monitor leads present    Borderline cardiomegaly. No sign of lobar consolidation vascular congestion or effusion. Hilar regions, mediastinal contours intact. No acute osseous abnormalities.    IMPRESSION: See above report    < end of copied text >

## 2020-01-08 NOTE — PROGRESS NOTE ADULT - ASSESSMENT
50 yo male with PMHx significant of CAD s/p BMS to RCA (8/2019), RUL subsegmental PE (6/2019, on Eliquis), hx of NSTEMI and PAF s/p ex-lap omentopexy and plication for perforated antral ulcer (5/2019), osteomyelitis s/p debridement, CVA s/p tPA (8/2019), HTN and DM2 (not on insulin) presenting to the ED with right sided weakness, facial droop, and slurred speech.    Hx CVA  - MR head negative.   - EEG unremarkable   - Continue Plavix and high-intensity statin      Afib  - continue eliquis 5mg po bid  - Continue with Lopressor BID with hold parameters  - Echo 12/24 as delineated above revealing severe LVH, normal systolic function ef 65% grade 2 diastolic dysfunction, mild MR, trace TR     CAD s/p PCI  - No symptoms suggestive of ischemia  - Continue Plavix 75 QD  - Continue Statin  - Continue Imdur  - Continue ARB and beta blocker     Awaiting D/C to NEERAJ Alvarez FNP-C  Cardiology NP  [id number] [phone]

## 2020-01-08 NOTE — DISCHARGE NOTE NURSING/CASE MANAGEMENT/SOCIAL WORK - PATIENT PORTAL LINK FT
You can access the FollowMyHealth Patient Portal offered by [facility] by registering at the following website: [email or web address]. By joining [facility]’s FollowMyHealth portal, you will also be able to view your health information using other applications (apps) compatible with our system.

## 2020-01-08 NOTE — PROGRESS NOTE ADULT - SUBJECTIVE AND OBJECTIVE BOX
Patient is a 49y old  Male who presents with a chief complaint of R sided numbness (08 Jan 2020 09:03)      INTERVAL HPI/OVERNIGHT EVENTS: Patient seen and examined. NAD. No complaints.    Vital Signs Last 24 Hrs  T(C): 36.5 (08 Jan 2020 04:20), Max: 37.1 (07 Jan 2020 20:42)  T(F): 97.7 (08 Jan 2020 04:20), Max: 98.7 (07 Jan 2020 20:42)  HR: 60 (08 Jan 2020 04:20) (51 - 69)  BP: 125/76 (08 Jan 2020 04:20) (117/70 - 149/80)  BP(mean): --  RR: 17 (08 Jan 2020 04:20) (15 - 17)  SpO2: 93% (08 Jan 2020 04:20) (93% - 98%)              CAPILLARY BLOOD GLUCOSE      POCT Blood Glucose.: 73 mg/dL (08 Jan 2020 07:36)  POCT Blood Glucose.: 115 mg/dL (07 Jan 2020 21:16)  POCT Blood Glucose.: 97 mg/dL (07 Jan 2020 17:04)  POCT Blood Glucose.: 129 mg/dL (07 Jan 2020 12:05)              acetaminophen   Tablet .. 650 milliGRAM(s) Oral every 6 hours PRN  apixaban 5 milliGRAM(s) Oral every 12 hours  atorvastatin 80 milliGRAM(s) Oral at bedtime  bethanechol 25 milliGRAM(s) Oral three times a day  clopidogrel Tablet 75 milliGRAM(s) Oral daily  dextrose 40% Gel 15 Gram(s) Oral once PRN  dextrose 5%. 1000 milliLiter(s) IV Continuous <Continuous>  dextrose 50% Injectable 12.5 Gram(s) IV Push once  dextrose 50% Injectable 25 Gram(s) IV Push once  dextrose 50% Injectable 25 Gram(s) IV Push once  glucagon  Injectable 1 milliGRAM(s) IntraMuscular once PRN  insulin glargine Injectable (LANTUS) 10 Unit(s) SubCutaneous two times a day  insulin lispro (HumaLOG) corrective regimen sliding scale   SubCutaneous three times a day before meals  insulin lispro (HumaLOG) corrective regimen sliding scale   SubCutaneous at bedtime  insulin lispro Injectable (HumaLOG) 7 Unit(s) SubCutaneous three times a day before meals  isosorbide   mononitrate ER Tablet (IMDUR) 30 milliGRAM(s) Oral daily  losartan 25 milliGRAM(s) Oral daily  methimazole 5 milliGRAM(s) Oral daily  metoprolol tartrate 25 milliGRAM(s) Oral every 12 hours  pantoprazole    Tablet 40 milliGRAM(s) Oral before breakfast  predniSONE   Tablet 5 milliGRAM(s) Oral daily  sucralfate 1 Gram(s) Oral four times a day  tamsulosin 0.4 milliGRAM(s) Oral at bedtime              REVIEW OF SYSTEMS:  CONSTITUTIONAL: No fever, no weight loss, or no fatigue  NECK: No pain, no stiffness  RESPIRATORY: No cough, no wheezing, no chills, no hemoptysis, No shortness of breath  CARDIOVASCULAR: No chest pain, no palpitations, no dizziness, no leg swelling  GASTROINTESTINAL: No abdominal pain. No nausea, no vomiting, no hematemesis; No diarrhea, no constipation. No melena, no hematochezia.  GENITOURINARY: No dysuria, no frequency, no hematuria, no incontinence  NEUROLOGICAL: No headaches, no loss of strength, no numbness, no tremors  SKIN: No itching, no burning  MUSCULOSKELETAL: No joint pain, no swelling; No muscle, no back, no extremity pain  PSYCHIATRIC: No depression, no mood swings,   HEME/LYMPH: No easy bruising, no bleeding gums  ALLERY AND IMMUNOLOGIC: No hives       Consultant(s) Notes Reviewed:  [X] YES  [ ] NO    PHYSICAL EXAM:  GENERAL: NAD  HEAD:  Atraumatic, Normocephalic  EYES: EOMI, PERRLA, conjunctiva and sclera clear  ENMT: No tonsillar erythema, exudates, or enlargement; Moist mucous membranes  NECK: Supple, No JVD  NERVOUS SYSTEM:  Awake & alert  CHEST/LUNG: Clear to auscultation bilaterally; No rales, rhonchi, wheezing,  HEART: Regular rate and rhythm  ABDOMEN: Soft, Nontender, Nondistended; Bowel sounds present  EXTREMITIES:  No clubbing, cyanosis, or edema  LYMPH: No lymphadenopathy noted  SKIN: No rashes      Advanced care planning discussed with patient/family [X] YES   [ ] NO    Advanced care planning discussed with patient/family. Advanced care planning forms reviewed/discussed/completed. 20 minutes spent.

## 2020-01-30 NOTE — PROGRESS NOTE ADULT - ATTENDING COMMENTS
Sonal Simms is a 37 y.o. , presents today for amenorrhea.      C/C: amenorrhea  She reports she has been to urgent care for unrelated issue and found out she was pregnant, she has NOT seen any other OB provider for this pregnancy.  Was patient of [facility] when she had her 7 yr old daughter (Brando)    HPI: Reports amenorrhea since Patient's last menstrual period was 2019 (within weeks).. Prior to LMP, menses were irregular/ absent for 5 months in last year she is completely UNSURE Of LMP and very nervous about ETOH use in pregnancy--has SONO today.  She is not currently on any contraception. + UPT at URGENT care in last two weeks.  Denies nausea and vomiting. Has not really noticed breast tenderness. Denies vaginal bleeding since LMP.    SOCIAL HISTORY: Denies emotional/mental/physical/sexual violence or abuse. Feels safe at home. Accompanied today by Dario (current partner)/ ANTONI, monogamous with him and this is her 5th pregnancy, first baby for Dario.     PAP HISTORY: last pap within last 6 months (records release signed)--REPORTS POSitive HPV (unknown type, likely NOT 16/18 b/c she DID NOT have colpo), pap result NILM.   NO history of abnormal pap smear, positive hx of chlamydia (teenager) and HPV (last pap)     Reports long-term chronic medical conditions: ANXIETY currently on atarax with helps--discussed safety in pregnancy (she only uses when anxiety is severe)  Admits to large amount of life stress/ trauma in past year: close friend  by suicide followed by her losing her job--this led to heavy substance use and absence of her period (she thought she was entering menopause at the time)  Completely unplanned pregnancy and now doing better, but tearful today and very worried about the baby--would likely consider termination if severe anomaly suspected and interested in genetic testing and early sonogram to evaluate fetal anatomy.    Review of patient's allergies indicates:  No Known  Allergies  Past Medical History:   Diagnosis Date    Anxiety     Elevated liver enzymes     secondary to alcohol use    Panic attack 2020    Patient states that this was due to exhaustion after having difficulty falling asleep due to anxiety     Past Surgical History:   Procedure Laterality Date    ANTERIOR CRUCIATE LIGAMENT REPAIR      age 22     Past Surgical History:   Procedure Laterality Date    ANTERIOR CRUCIATE LIGAMENT REPAIR      age 22     OB History    Para Term  AB Living   4 1 1 0 1 1   SAB TAB Ectopic Multiple Live Births   1 0 0 0        # Outcome Date GA Lbr Ponce/2nd Weight Sex Delivery Anes PTL Lv   4 Current            3             2 SAB            1 Term              OB History        4    Para   1    Term   1       0    AB   1    Living   1       SAB   1    TAB   0    Ectopic   0    Multiple   0    Live Births                   Social History     Socioeconomic History    Marital status:      Spouse name: Not on file    Number of children: Not on file    Years of education: Not on file    Highest education level: Not on file   Occupational History    Not on file   Social Needs    Financial resource strain: Not on file    Food insecurity:     Worry: Not on file     Inability: Not on file    Transportation needs:     Medical: Not on file     Non-medical: Not on file   Tobacco Use    Smoking status: Not on file   Substance and Sexual Activity    Alcohol use: Not on file    Drug use: Not on file    Sexual activity: Not on file   Lifestyle    Physical activity:     Days per week: Not on file     Minutes per session: Not on file    Stress: Not on file   Relationships    Social connections:     Talks on phone: Not on file     Gets together: Not on file     Attends Restorationist service: Not on file     Active member of club or organization: Not on file     Attends meetings of clubs or organizations: Not on file     Relationship status: Not  on file   Other Topics Concern    Not on file   Social History Narrative    Not on file     History reviewed. No pertinent family history.  Social History     Substance and Sexual Activity   Sexual Activity Not on file       GENETIC SCREENING   Patient's age 35 years or older as of estimated date of delivery? Y  Neural tube defect (meningomyelocele, spina bifida, or anencephaly)? n  Down syndrome? n  Blaise-Sachs (Ashkenazi Voodoo, Cajun, Korean Cook Islander)? n  Canavan disease (Ashkenazi Voodoo)? n  Familial dysautonomia (Ashkenazi Voodoo)? n  Sickle cell disease or trait ()? n  Hemophilia or other blood disorders? n  Cystic fibrosis? n  Muscular dystrophy? n  Perry's chorea? n  Thalassemia (Italian, Greek, Mediterranean, or  background) MCV less than 80? n  Congenital heart defect? n  Mental retardation/autism? n   If Yes, was person tested for Fragile X? n  Other inherited genetic or chromosomal disorder? n  Maternal metabolic disorder (e.g. type 1 diabetes, PKU)? n  Patient or baby's father had a child with birth defects not listed above? n  Recurrent pregnancy loss or a stillbirth: n  Medications (including supplements, vitamins, herbs or OTC drugs)/illicit/recreational drugs/alcohol since last menstrual period? HEAVY alcohol use (concerned), herbal teas (dandylion, thistle)   If yes, agent(s) and strength/dose:   List any other genetic risks: n  Comments/counseling: pending gestational age will send for early SONO at [facility] due to alcohol exposure, advised against use of dandilion (was using for liver), and cautioned use of herbs due to unknown safety in pregnancy.    INFECTION HISTORY  Live with someone with TB or exposed to TB: n  Patient or partner has history of genital herpes: n  Rash or viral illness since last menstrual period: n  Patient or partner has hepatitis B or C: n  History of STD, gonorrhea, chlamydia, HPV, HIV, syphilis (list all that apply): Y --chlamydia as teenager and HPV (high  "risk)  List other infections: n/a  Additional comments: n    Primary Doctor No     ROS:  negative  Constitutional/Gen: Denies fevers, chills, malaise, or weight loss. minimal fatigue   Psych: Denies depression, anxiety  Eyes: Denies changes in vision or scotomata  Ears, nose, mouth, throat: Denies sinus tenderness, swelling, or dentition problems  CV/vasc: Denies heart palpitations or edema  Resp: Denies SOB or dyspnea  Breasts: Denies mass, nipple discharge, or trauma. negative breast tenderness.  GI: Denies constipation, diarrhea, or vomiting. negative nausea.  : Denies vaginal discharge, dysuria or pelvic pain. +dysuria and urinary frequency concerned about UTI  MS: Denies weakness, soreness, or changes in ROM    OBJECTIVE:  /84   Ht 5' 9" (1.753 m)   Wt 69.9 kg (154 lb 1.6 oz)   LMP 2019 (Within Weeks)   BMI 22.76 kg/m²   Constitutional/Gen: NAD, appears stated age, well groomed  Neck: supple, no masses or enlargement  Head: normocephalic  Extremities: FROM, with no edema or tenderness.  Neurologic: A&O x 4, non-focal, cranial nerves 2-12 grossly intact  Psych: affect appropriate and without signs of mood, thought or memory difficulty appreciated      UPT Positive in office  UA: positive blood, culture sent  ASSESSMENT:  37 y.o. female  with amenorrhea  Likely at unknown gestation secondary to unknown LMP, pending sonogram results.  Body mass index is 22.76 kg/m².  Patient Active Problem List   Diagnosis    Alcohol use affecting pregnanccy--possible heavy use in early pregnancy: NO CURRENT use    Pregnancy    Anxiety    Panic attack    Birth Center Risk Assessment: 2 - Consultation with OB to develop plan of care (alcohol use in early preg before being aware of pregnancy)    Cystic thyroid nodule    Low-lying placenta in first trimester       PLAN:  1. Amenorrhea  -- + UPT in office, Patient's last menstrual period was 2019 (within weeks). --> Estimated Date of Delivery: " None noted.  -- Dating US ordered and TODAY  -- Anatomical US will order once EDC determined  -- Routine serum and urine prenatal labs today    2. Physical exam today. Discussed ASCCP guidelines. Last pap 6 months ago, NILM w/ positive HPV. --pap due in one year (likely due postpartum)  3. BMI 22  -- Discussed IOM recommended weight gain of:   Weight category Pre pre BMI  Recommended TWG   Underweight Less than 18.5 28-40    Normal Weight 18.5-24.9  25-35    Overweight 25-29.9  15-25    Obese   30 and greater  11-20   -- Discussed criteria for delivery at [facility] r/t excessive pre-preg weight or excessive weight gain:   Pre-pregnancy BMI over 40 or excess pregnancy weight gain defined as:   Pre-preg BMI < 18.5; Excess weight gain = > 60 pound   Pre-preg BMI 18.5-24.9;  Excess weight gain = > 53 pounds   Pre-preg BMI 25-29.9;  Excess weight gain = > 38 pounds   Pre-preg BMI > 30;  Excess weight gain = > 30 pounds    4. Discussed nausea and vomiting in pregnancy  -- Education regarding lifestyle and dietary modifications  -- Reviewed use of B6/Unisom prn. Pt will notify us if no relief/worsening symptoms, will consider alternative therapies prn    5. Pregnancy education and couseling; handouts and booklet provided  -- She has not already attended [facility] and has not toured facility  -- Oriented to practice and anticipated prenatal course of care and how to contact us  -- Reviewed ABC guidelines and admission, exclusion, and transfer criteria  -- Precautions/warning signs reviewed  -- Common complaints of pregnancy  -- Routine prenatal labs including HIV  -- Ultrasounds  -- Childbirth education/hospital/[facility] facilities  -- Nutrition, prepregnant BMI, and recommended weight gain  -- Toxoplasmosis precautions (Cats/Raw Meat)  -- Sexual activity and exercise  -- Environmental/Work hazards  -- Travel  -- Tobacco (Ask, Advise, Assess, Assist, and Arrange), as well as alcohol and drug use  -- Use of any medications  "(Including supplements, Vitamins, Herbs, or OTC Drugs)  -- Domestic violence screen negative    6. Reviewed genetic testing options. Reviewed available first trimester and/or second  trimester screening options. Reviewed risk of false positive/negative results and recommendation of referral to [facility] in event of a positive result, for NIPT, US, and/or amniocentesis. Reviewed the possible estimated 1 in 300/500 risk of miscarriage with amniocentesis, even with a healthy fetus. Patient likely interested in genetic screening---sent with info for MT21/ carrier screening and plan to f/u in two weeks (did not want drawn today  after sonogram dating determined 14 weeks gestation)    Carrier screening discussed, ACOG recommendations reviewed and pamphlet given.    7. AMA  -- Referral to [facility]  -- Level II sonogram and genetic/ aneuploidy screening options discussed    8. History of heavy alcohol use possibly in first trimester---plan for early anatomy sono with [facility], patient states today she is unsure if she would consider termination.  Discussed research is not definitive on alcohol exposure and the "AMOUNT" it takes to lead to FAS-includes some anatomy variations and developmental delays--many of which won't know until child gets older. Tried to provide support and reassurance to patient and advised she continue to refrain from alcohol intake during pregnancy.  She has not had a drink since she found out she was pregnant and denies any withdrawal symptoms (illness sx since d/c). She also denies drug use.    9. Reviewed warning signs, precautions, and how/when to contact us.     10. RTC x 2 weeks, call or present sooner prn. Recommend FLU vaccines    Updated Medication List:  Current Outpatient Medications   Medication Sig Dispense Refill    hydrOXYzine HCl (ATARAX) 25 MG tablet Take 25 mg by mouth 3 (three) times daily.      multivitamin capsule Take 1 capsule by mouth once daily.      nitrofurantoin, " I saw and examined the patient personally. Spoke with above provider regarding this case. I reviewed the above findings completely.  I agree with the above history, physical, and plan which I have edited where appropriate. macrocrystal-monohydrate, (MACROBID) 100 MG capsule Take 1 capsule (100 mg total) by mouth 2 (two) times daily. for 7 days 14 capsule 0     No current facility-administered medications for this visit.      I spent a total of 45 minutes face-to-face time with this patient of which more than 50% was spent counseling and coordinating care regarding possible pregnancy and related concerns/ risk factors.         Ashly Piper CNM, MSN  01/30/2020 1/30/2020 2:53 PM

## 2020-02-05 ENCOUNTER — EMERGENCY (EMERGENCY)
Facility: HOSPITAL | Age: 50
LOS: 1 days | Discharge: HOME | End: 2020-02-05
Attending: EMERGENCY MEDICINE | Admitting: EMERGENCY MEDICINE
Payer: MEDICAID

## 2020-02-05 VITALS
RESPIRATION RATE: 15 BRPM | HEART RATE: 68 BPM | SYSTOLIC BLOOD PRESSURE: 130 MMHG | TEMPERATURE: 98 F | OXYGEN SATURATION: 98 % | DIASTOLIC BLOOD PRESSURE: 80 MMHG

## 2020-02-05 VITALS
TEMPERATURE: 98 F | RESPIRATION RATE: 14 BRPM | WEIGHT: 205.03 LBS | HEART RATE: 64 BPM | DIASTOLIC BLOOD PRESSURE: 84 MMHG | OXYGEN SATURATION: 98 % | SYSTOLIC BLOOD PRESSURE: 153 MMHG

## 2020-02-05 DIAGNOSIS — Z98.890 OTHER SPECIFIED POSTPROCEDURAL STATES: Chronic | ICD-10-CM

## 2020-02-05 DIAGNOSIS — S98.912A COMPLETE TRAUMATIC AMPUTATION OF LEFT FOOT, LEVEL UNSPECIFIED, INITIAL ENCOUNTER: Chronic | ICD-10-CM

## 2020-02-05 DIAGNOSIS — K25.5 CHRONIC OR UNSPECIFIED GASTRIC ULCER WITH PERFORATION: Chronic | ICD-10-CM

## 2020-02-05 PROCEDURE — 51702 INSERT TEMP BLADDER CATH: CPT

## 2020-02-05 PROCEDURE — 99282 EMERGENCY DEPT VISIT SF MDM: CPT

## 2020-02-05 PROCEDURE — 99283 EMERGENCY DEPT VISIT LOW MDM: CPT | Mod: 25

## 2020-02-05 NOTE — ED PROVIDER NOTE - OBJECTIVE STATEMENT
50 y/o M with chronic indwelling Aguilera catheter for chronic urinary retention secondary to BPH states his Aguilera is not draining properly toady.  Aguilera changed in the ED, draining.

## 2020-02-05 NOTE — ED PROVIDER NOTE - PATIENT PORTAL LINK FT
You can access the FollowMyHealth Patient Portal offered by [facility] by registering at the following website: [email or web address]. By joining [employer]’s FollowMyHealth portal, you will also be able to view your health information using other applications (apps) compatible with our system.

## 2020-03-15 ENCOUNTER — EMERGENCY (EMERGENCY)
Facility: HOSPITAL | Age: 50
LOS: 1 days | Discharge: HOME | End: 2020-03-15
Attending: INTERNAL MEDICINE | Admitting: INTERNAL MEDICINE
Payer: MEDICAID

## 2020-03-15 VITALS
SYSTOLIC BLOOD PRESSURE: 165 MMHG | HEIGHT: 74 IN | DIASTOLIC BLOOD PRESSURE: 94 MMHG | HEART RATE: 83 BPM | TEMPERATURE: 98 F | OXYGEN SATURATION: 100 % | WEIGHT: 220.02 LBS | RESPIRATION RATE: 18 BRPM

## 2020-03-15 VITALS
OXYGEN SATURATION: 98 % | HEART RATE: 60 BPM | RESPIRATION RATE: 18 BRPM | TEMPERATURE: 98 F | SYSTOLIC BLOOD PRESSURE: 143 MMHG | DIASTOLIC BLOOD PRESSURE: 82 MMHG

## 2020-03-15 DIAGNOSIS — K25.5 CHRONIC OR UNSPECIFIED GASTRIC ULCER WITH PERFORATION: Chronic | ICD-10-CM

## 2020-03-15 DIAGNOSIS — S98.912A COMPLETE TRAUMATIC AMPUTATION OF LEFT FOOT, LEVEL UNSPECIFIED, INITIAL ENCOUNTER: Chronic | ICD-10-CM

## 2020-03-15 DIAGNOSIS — Z98.890 OTHER SPECIFIED POSTPROCEDURAL STATES: Chronic | ICD-10-CM

## 2020-03-15 LAB
APPEARANCE UR: ABNORMAL
BACTERIA # UR AUTO: ABNORMAL
BILIRUB UR-MCNC: NEGATIVE — SIGNIFICANT CHANGE UP
COLOR SPEC: YELLOW — SIGNIFICANT CHANGE UP
DIFF PNL FLD: ABNORMAL
GLUCOSE UR QL: 1000 MG/DL
KETONES UR-MCNC: NEGATIVE — SIGNIFICANT CHANGE UP
LEUKOCYTE ESTERASE UR-ACNC: ABNORMAL
NITRITE UR-MCNC: NEGATIVE — SIGNIFICANT CHANGE UP
PH UR: 5 — SIGNIFICANT CHANGE UP (ref 5–8)
PROT UR-MCNC: 30 MG/DL
RBC CASTS # UR COMP ASSIST: ABNORMAL /HPF (ref 0–4)
SP GR SPEC: 1.01 — SIGNIFICANT CHANGE UP (ref 1.01–1.02)
UROBILINOGEN FLD QL: NEGATIVE — SIGNIFICANT CHANGE UP
WBC UR QL: ABNORMAL

## 2020-03-15 PROCEDURE — 51702 INSERT TEMP BLADDER CATH: CPT

## 2020-03-15 PROCEDURE — 87086 URINE CULTURE/COLONY COUNT: CPT

## 2020-03-15 PROCEDURE — 81001 URINALYSIS AUTO W/SCOPE: CPT

## 2020-03-15 PROCEDURE — 99283 EMERGENCY DEPT VISIT LOW MDM: CPT | Mod: 25

## 2020-03-15 PROCEDURE — 99283 EMERGENCY DEPT VISIT LOW MDM: CPT

## 2020-03-15 RX ORDER — MOXIFLOXACIN HYDROCHLORIDE TABLETS, 400 MG 400 MG/1
1 TABLET, FILM COATED ORAL
Qty: 20 | Refills: 0
Start: 2020-03-15 | End: 2020-03-24

## 2020-03-15 RX ORDER — CIPROFLOXACIN LACTATE 400MG/40ML
500 VIAL (ML) INTRAVENOUS ONCE
Refills: 0 | Status: COMPLETED | OUTPATIENT
Start: 2020-03-15 | End: 2020-03-15

## 2020-03-15 RX ORDER — PHENAZOPYRIDINE HCL 100 MG
1 TABLET ORAL
Qty: 10 | Refills: 0
Start: 2020-03-15 | End: 2020-03-17

## 2020-03-15 RX ORDER — PHENAZOPYRIDINE HCL 100 MG
200 TABLET ORAL ONCE
Refills: 0 | Status: COMPLETED | OUTPATIENT
Start: 2020-03-15 | End: 2020-03-15

## 2020-03-15 RX ADMIN — Medication 200 MILLIGRAM(S): at 02:27

## 2020-03-15 RX ADMIN — Medication 500 MILLIGRAM(S): at 02:27

## 2020-03-15 NOTE — ED PROVIDER NOTE - OBJECTIVE STATEMENT
chronic indwelling jacques catheter not draining x1 hour  c/o suprapubic pain   urologist is Amie  urinary catheter complications 48 y/o male with DM, neurogenic bladder , chronic indwelling Aguilera catheter not draining x1 hour, also notes dysuria symptoms consistent with his previous UTI.   c/o suprapubic pain, he is requesting for a change of his cathter   He informed us that previous UTIs were effectively treated with Cipro and pyridium.   no fever no chills, no back pain, no nausea, no vomiting

## 2020-03-15 NOTE — ED ADULT NURSE NOTE - NSIMPLEMENTINTERV_GEN_ALL_ED
Implemented All Fall with Harm Risk Interventions:  [place]ollock to call system. Call bell, personal items and telephone within reach. Instruct patient to call for assistance. Room bathroom lighting operational. Non-slip footwear when patient is off stretcher. Physically safe environment: no spills, clutter or unnecessary equipment. Stretcher in lowest position, wheels locked, appropriate side rails in place. Provide visual cue, wrist band, yellow gown, etc. Monitor gait and stability. Monitor for mental status changes and reorient to person, place, and time. Review medications for side effects contributing to fall risk. Reinforce activity limits and safety measures with patient and family. Provide visual clues: red socks.

## 2020-03-15 NOTE — ED PROVIDER NOTE - CARE PROVIDER_API CALL
Pascual Holloway)  Urology  [address], [address]  [place] [address]  Phone: [phone]  Fax: [phone]  Follow Up Time: 1-3 Days

## 2020-03-15 NOTE — ED PROVIDER NOTE - PATIENT PORTAL LINK FT
You can access the FollowMyHealth Patient Portal offered by [facility] by registering at the following website: [email or web address]. By joining [email or web address]’s FollowMyHealth portal, you will also be able to view your health information using other applications (apps) compatible with our system.

## 2020-03-15 NOTE — ED ADULT NURSE NOTE - OBJECTIVE STATEMENT
50 y/o M patient presents to ED from home c/o Aguilera cathter "not draining." Patient reports Aguilera was placed 6-8 weeks ago, patient unsure of actual date. As per patient 2 hours ago he began to notice no output into drainage bag and felt suprapubic pressure with burning at tip of penis. Patient A&Ox4. lungs CTA. skin warm. Patient denies HA, dizziness, SOB, chest pain, abdominal pain, N/V/D. Safety and comfort measures provided and maintained. MD bedside.

## 2020-03-15 NOTE — ED PROVIDER NOTE - SIGNIFICANT NEGATIVE FINDINGS
no headache, no chest pain, no SOB, no palpitations, no abdominal pain, n/v/d , no urinary  bleeding. no neuro changes.

## 2020-03-15 NOTE — ED PROVIDER NOTE - CLINICAL SUMMARY MEDICAL DECISION MAKING FREE TEXT BOX
chronic jacques due to neurogenic bladder with partial occlusion due to increased mucous and dysuria symptoms plan change jacques and rx cipro and pyridium f/u with urology

## 2020-03-16 LAB
CULTURE RESULTS: SIGNIFICANT CHANGE UP
SPECIMEN SOURCE: SIGNIFICANT CHANGE UP

## 2020-03-16 NOTE — ED POST DISCHARGE NOTE - DETAILS
left message with brother to call back tobias gonzalez 3/16/2020 attempt to call 3/18/2020 no answer no vm

## 2020-05-18 ENCOUNTER — INPATIENT (INPATIENT)
Facility: HOSPITAL | Age: 50
LOS: 4 days | Discharge: NURSING, REHAB OR LONG-TERM CARE | DRG: 617 | End: 2020-05-23
Attending: FAMILY MEDICINE | Admitting: FAMILY MEDICINE
Payer: COMMERCIAL

## 2020-05-18 VITALS
HEART RATE: 92 BPM | RESPIRATION RATE: 23 BRPM | HEIGHT: 74 IN | SYSTOLIC BLOOD PRESSURE: 144 MMHG | DIASTOLIC BLOOD PRESSURE: 82 MMHG | OXYGEN SATURATION: 96 % | TEMPERATURE: 99 F | WEIGHT: 220.02 LBS

## 2020-05-18 DIAGNOSIS — K25.5 CHRONIC OR UNSPECIFIED GASTRIC ULCER WITH PERFORATION: Chronic | ICD-10-CM

## 2020-05-18 DIAGNOSIS — L03.116 CELLULITIS OF LEFT LOWER LIMB: ICD-10-CM

## 2020-05-18 DIAGNOSIS — Z98.890 OTHER SPECIFIED POSTPROCEDURAL STATES: Chronic | ICD-10-CM

## 2020-05-18 DIAGNOSIS — S98.912A COMPLETE TRAUMATIC AMPUTATION OF LEFT FOOT, LEVEL UNSPECIFIED, INITIAL ENCOUNTER: Chronic | ICD-10-CM

## 2020-05-18 LAB
ALBUMIN SERPL ELPH-MCNC: 2.2 G/DL — LOW (ref 3.3–5)
ALP SERPL-CCNC: 114 U/L — SIGNIFICANT CHANGE UP (ref 40–120)
ALT FLD-CCNC: 25 U/L — SIGNIFICANT CHANGE UP (ref 12–78)
ANION GAP SERPL CALC-SCNC: 6 MMOL/L — SIGNIFICANT CHANGE UP (ref 5–17)
APPEARANCE UR: ABNORMAL
APTT BLD: 30.4 SEC — SIGNIFICANT CHANGE UP (ref 28.5–37)
AST SERPL-CCNC: 19 U/L — SIGNIFICANT CHANGE UP (ref 15–37)
BACTERIA # UR AUTO: ABNORMAL
BASOPHILS # BLD AUTO: 0.02 K/UL — SIGNIFICANT CHANGE UP (ref 0–0.2)
BASOPHILS NFR BLD AUTO: 0.2 % — SIGNIFICANT CHANGE UP (ref 0–2)
BILIRUB SERPL-MCNC: 0.2 MG/DL — SIGNIFICANT CHANGE UP (ref 0.2–1.2)
BILIRUB UR-MCNC: NEGATIVE — SIGNIFICANT CHANGE UP
BLD GP AB SCN SERPL QL: SIGNIFICANT CHANGE UP
BUN SERPL-MCNC: 10 MG/DL — SIGNIFICANT CHANGE UP (ref 7–23)
CALCIUM SERPL-MCNC: 8.3 MG/DL — LOW (ref 8.5–10.1)
CHLORIDE SERPL-SCNC: 104 MMOL/L — SIGNIFICANT CHANGE UP (ref 96–108)
CO2 SERPL-SCNC: 25 MMOL/L — SIGNIFICANT CHANGE UP (ref 22–31)
COLOR SPEC: YELLOW — SIGNIFICANT CHANGE UP
CREAT SERPL-MCNC: 0.95 MG/DL — SIGNIFICANT CHANGE UP (ref 0.5–1.3)
DIFF PNL FLD: ABNORMAL
EOSINOPHIL # BLD AUTO: 0.17 K/UL — SIGNIFICANT CHANGE UP (ref 0–0.5)
EOSINOPHIL NFR BLD AUTO: 1.8 % — SIGNIFICANT CHANGE UP (ref 0–6)
EPI CELLS # UR: SIGNIFICANT CHANGE UP
ERYTHROCYTE [SEDIMENTATION RATE] IN BLOOD: 60 MM/HR — HIGH (ref 0–20)
GLUCOSE SERPL-MCNC: 345 MG/DL — HIGH (ref 70–99)
GLUCOSE UR QL: 1000 MG/DL
HCT VFR BLD CALC: 34.4 % — LOW (ref 39–50)
HGB BLD-MCNC: 10.8 G/DL — LOW (ref 13–17)
IMM GRANULOCYTES NFR BLD AUTO: 1 % — SIGNIFICANT CHANGE UP (ref 0–1.5)
INR BLD: 1.1 RATIO — SIGNIFICANT CHANGE UP (ref 0.88–1.16)
KETONES UR-MCNC: ABNORMAL
LACTATE SERPL-SCNC: 1.4 MMOL/L — SIGNIFICANT CHANGE UP (ref 0.7–2)
LEUKOCYTE ESTERASE UR-ACNC: ABNORMAL
LYMPHOCYTES # BLD AUTO: 1.32 K/UL — SIGNIFICANT CHANGE UP (ref 1–3.3)
LYMPHOCYTES # BLD AUTO: 14.1 % — SIGNIFICANT CHANGE UP (ref 13–44)
MAGNESIUM SERPL-MCNC: 1.9 MG/DL — SIGNIFICANT CHANGE UP (ref 1.6–2.6)
MCHC RBC-ENTMCNC: 27.1 PG — SIGNIFICANT CHANGE UP (ref 27–34)
MCHC RBC-ENTMCNC: 31.4 GM/DL — LOW (ref 32–36)
MCV RBC AUTO: 86.4 FL — SIGNIFICANT CHANGE UP (ref 80–100)
MONOCYTES # BLD AUTO: 0.79 K/UL — SIGNIFICANT CHANGE UP (ref 0–0.9)
MONOCYTES NFR BLD AUTO: 8.4 % — SIGNIFICANT CHANGE UP (ref 2–14)
NEUTROPHILS # BLD AUTO: 6.99 K/UL — SIGNIFICANT CHANGE UP (ref 1.8–7.4)
NEUTROPHILS NFR BLD AUTO: 74.5 % — SIGNIFICANT CHANGE UP (ref 43–77)
NITRITE UR-MCNC: NEGATIVE — SIGNIFICANT CHANGE UP
NRBC # BLD: 0 /100 WBCS — SIGNIFICANT CHANGE UP (ref 0–0)
PH UR: 5 — SIGNIFICANT CHANGE UP (ref 5–8)
PLATELET # BLD AUTO: 303 K/UL — SIGNIFICANT CHANGE UP (ref 150–400)
POTASSIUM SERPL-MCNC: 3.8 MMOL/L — SIGNIFICANT CHANGE UP (ref 3.5–5.3)
POTASSIUM SERPL-SCNC: 3.8 MMOL/L — SIGNIFICANT CHANGE UP (ref 3.5–5.3)
PROT SERPL-MCNC: 7 G/DL — SIGNIFICANT CHANGE UP (ref 6–8.3)
PROT UR-MCNC: 15
PROTHROM AB SERPL-ACNC: 12.4 SEC — SIGNIFICANT CHANGE UP (ref 10–12.9)
RBC # BLD: 3.98 M/UL — LOW (ref 4.2–5.8)
RBC # FLD: 15.5 % — HIGH (ref 10.3–14.5)
RBC CASTS # UR COMP ASSIST: ABNORMAL /HPF (ref 0–4)
SODIUM SERPL-SCNC: 135 MMOL/L — SIGNIFICANT CHANGE UP (ref 135–145)
SP GR SPEC: 1.01 — SIGNIFICANT CHANGE UP (ref 1.01–1.02)
UROBILINOGEN FLD QL: NEGATIVE — SIGNIFICANT CHANGE UP
WBC # BLD: 9.38 K/UL — SIGNIFICANT CHANGE UP (ref 3.8–10.5)
WBC # FLD AUTO: 9.38 K/UL — SIGNIFICANT CHANGE UP (ref 3.8–10.5)
WBC UR QL: ABNORMAL

## 2020-05-18 PROCEDURE — 73630 X-RAY EXAM OF FOOT: CPT | Mod: 26,LT

## 2020-05-18 PROCEDURE — 93971 EXTREMITY STUDY: CPT | Mod: 26,LT

## 2020-05-18 PROCEDURE — 73590 X-RAY EXAM OF LOWER LEG: CPT | Mod: 26,LT

## 2020-05-18 PROCEDURE — 71045 X-RAY EXAM CHEST 1 VIEW: CPT | Mod: 26

## 2020-05-18 PROCEDURE — 99285 EMERGENCY DEPT VISIT HI MDM: CPT

## 2020-05-18 RX ORDER — VANCOMYCIN HCL 1 G
1000 VIAL (EA) INTRAVENOUS ONCE
Refills: 0 | Status: COMPLETED | OUTPATIENT
Start: 2020-05-18 | End: 2020-05-18

## 2020-05-18 RX ORDER — MORPHINE SULFATE 50 MG/1
4 CAPSULE, EXTENDED RELEASE ORAL ONCE
Refills: 0 | Status: DISCONTINUED | OUTPATIENT
Start: 2020-05-18 | End: 2020-05-18

## 2020-05-18 RX ORDER — PIPERACILLIN AND TAZOBACTAM 4; .5 G/20ML; G/20ML
3.38 INJECTION, POWDER, LYOPHILIZED, FOR SOLUTION INTRAVENOUS ONCE
Refills: 0 | Status: COMPLETED | OUTPATIENT
Start: 2020-05-18 | End: 2020-05-18

## 2020-05-18 RX ADMIN — PIPERACILLIN AND TAZOBACTAM 200 GRAM(S): 4; .5 INJECTION, POWDER, LYOPHILIZED, FOR SOLUTION INTRAVENOUS at 21:52

## 2020-05-18 RX ADMIN — MORPHINE SULFATE 4 MILLIGRAM(S): 50 CAPSULE, EXTENDED RELEASE ORAL at 20:23

## 2020-05-18 RX ADMIN — PIPERACILLIN AND TAZOBACTAM 3.38 GRAM(S): 4; .5 INJECTION, POWDER, LYOPHILIZED, FOR SOLUTION INTRAVENOUS at 23:19

## 2020-05-18 RX ADMIN — Medication 250 MILLIGRAM(S): at 23:22

## 2020-05-18 NOTE — ED ADULT NURSE NOTE - OBJECTIVE STATEMENT
Pt comes from triage. Pt's left foot has white film to around toe around that is newly developed starting this week. Pt reports swelling, redness and discoloration of foot and leg are chronic. Pt's right skin has discoloration and blistering present. Pt has left great toe amputation since last year. Pt breathing easy, unlabored, no signs of distress. Pt has chronic jacques, reports it is for neurogenic bladder, jacques last changed a couple months ago per pt. Pt comes from triage. Pt's left foot has white film to around toe around that is newly developed starting this week. Pt reports swelling, redness and discoloration of foot and leg are chronic. Pt has necrotic area to bottom of left foot. Pt's right skin has discoloration and blistering present. Pt has left great toe amputation since last year. Pt breathing easy, unlabored, no signs of distress. Pt has chronic jacques, reports it is for neurogenic bladder, jacques last changed a couple months ago per pt.

## 2020-05-18 NOTE — ED PROVIDER NOTE - OBJECTIVE STATEMENT
49 male presents to ER c/o left foot pain, states he has neuropathy, may have stepped onto something that caused an abrasion, developed into swelling, redness and pain, worse with palpation and walking, patient was reluctant to come into the hospital due to the corona virus, denies fever, no coughing, no known sick contacts, has been at home.

## 2020-05-18 NOTE — ED PROVIDER NOTE - CLINICAL SUMMARY MEDICAL DECISION MAKING FREE TEXT BOX
cellulits of left foot, h/o osteomyletis, start broad spectrum antibiotics, pain control, labs, cultures, esr, crp, type and screen, admit

## 2020-05-18 NOTE — ED PROVIDER NOTE - LOWER EXTREMITY EXAM, LEFT
LIMITED ROM/SWELLING/TENDERNESS/post left 1st toe amputation, erythema of 2nd to 5th toes with swelling, extending to anterior shin, unstageable ulcer to sole of foot, necrotic

## 2020-05-18 NOTE — ED ADULT NURSE REASSESSMENT NOTE - NS ED NURSE REASSESS COMMENT FT1
Called phlebotomist for bloodwork, pt is difficult stick, able to get IV line but no blood. MD Ludwig aware.

## 2020-05-18 NOTE — ED ADULT NURSE NOTE - CHPI ED NUR SYMPTOMS NEG
no nausea/no tingling/no decreased eating/drinking/no weakness/no vomiting/no dizziness/no fever/no chills

## 2020-05-18 NOTE — ED PROVIDER NOTE - PROGRESS NOTE DETAILS
spoke with Dr. Irving covering for Dr. Hale, case discussed, will see in the am for consult spoke with Dr. Irving [phone] covering for Dr. Hale, case discussed, will see in the am for consult patient states his PMD is Dr. Murray, spoke with Dr. Felton, covering for Dr. Brizuela

## 2020-05-19 ENCOUNTER — TRANSCRIPTION ENCOUNTER (OUTPATIENT)
Age: 50
End: 2020-05-19

## 2020-05-19 DIAGNOSIS — I10 ESSENTIAL (PRIMARY) HYPERTENSION: ICD-10-CM

## 2020-05-19 DIAGNOSIS — M86.10 OTHER ACUTE OSTEOMYELITIS, UNSPECIFIED SITE: ICD-10-CM

## 2020-05-19 DIAGNOSIS — I48.91 UNSPECIFIED ATRIAL FIBRILLATION: ICD-10-CM

## 2020-05-19 DIAGNOSIS — E11.9 TYPE 2 DIABETES MELLITUS WITHOUT COMPLICATIONS: ICD-10-CM

## 2020-05-19 DIAGNOSIS — I25.10 ATHEROSCLEROTIC HEART DISEASE OF NATIVE CORONARY ARTERY WITHOUT ANGINA PECTORIS: ICD-10-CM

## 2020-05-19 LAB
A1C WITH ESTIMATED AVERAGE GLUCOSE RESULT: 11.6 % — HIGH (ref 4–5.6)
ANION GAP SERPL CALC-SCNC: 8 MMOL/L — SIGNIFICANT CHANGE UP (ref 5–17)
BASOPHILS # BLD AUTO: 0.03 K/UL — SIGNIFICANT CHANGE UP (ref 0–0.2)
BASOPHILS NFR BLD AUTO: 0.3 % — SIGNIFICANT CHANGE UP (ref 0–2)
BUN SERPL-MCNC: 8 MG/DL — SIGNIFICANT CHANGE UP (ref 7–23)
CALCIUM SERPL-MCNC: 8.7 MG/DL — SIGNIFICANT CHANGE UP (ref 8.5–10.1)
CHLORIDE SERPL-SCNC: 104 MMOL/L — SIGNIFICANT CHANGE UP (ref 96–108)
CO2 SERPL-SCNC: 26 MMOL/L — SIGNIFICANT CHANGE UP (ref 22–31)
CREAT SERPL-MCNC: 0.92 MG/DL — SIGNIFICANT CHANGE UP (ref 0.5–1.3)
CRP SERPL-MCNC: 10.72 MG/DL — HIGH (ref 0–0.4)
CRP SERPL-MCNC: 9.41 MG/DL — HIGH (ref 0–0.4)
CULTURE RESULTS: SIGNIFICANT CHANGE UP
EOSINOPHIL # BLD AUTO: 0.17 K/UL — SIGNIFICANT CHANGE UP (ref 0–0.5)
EOSINOPHIL NFR BLD AUTO: 1.9 % — SIGNIFICANT CHANGE UP (ref 0–6)
ERYTHROCYTE [SEDIMENTATION RATE] IN BLOOD: 55 MM/HR — HIGH (ref 0–20)
ESTIMATED AVERAGE GLUCOSE: 286 MG/DL — HIGH (ref 68–114)
GLUCOSE SERPL-MCNC: 319 MG/DL — HIGH (ref 70–99)
HCT VFR BLD CALC: 35.1 % — LOW (ref 39–50)
HGB BLD-MCNC: 11 G/DL — LOW (ref 13–17)
IMM GRANULOCYTES NFR BLD AUTO: 0.9 % — SIGNIFICANT CHANGE UP (ref 0–1.5)
LYMPHOCYTES # BLD AUTO: 1.17 K/UL — SIGNIFICANT CHANGE UP (ref 1–3.3)
LYMPHOCYTES # BLD AUTO: 12.8 % — LOW (ref 13–44)
MCHC RBC-ENTMCNC: 27.2 PG — SIGNIFICANT CHANGE UP (ref 27–34)
MCHC RBC-ENTMCNC: 31.3 GM/DL — LOW (ref 32–36)
MCV RBC AUTO: 86.9 FL — SIGNIFICANT CHANGE UP (ref 80–100)
MONOCYTES # BLD AUTO: 0.79 K/UL — SIGNIFICANT CHANGE UP (ref 0–0.9)
MONOCYTES NFR BLD AUTO: 8.7 % — SIGNIFICANT CHANGE UP (ref 2–14)
NEUTROPHILS # BLD AUTO: 6.87 K/UL — SIGNIFICANT CHANGE UP (ref 1.8–7.4)
NEUTROPHILS NFR BLD AUTO: 75.4 % — SIGNIFICANT CHANGE UP (ref 43–77)
NRBC # BLD: 0 /100 WBCS — SIGNIFICANT CHANGE UP (ref 0–0)
PLATELET # BLD AUTO: 326 K/UL — SIGNIFICANT CHANGE UP (ref 150–400)
POTASSIUM SERPL-MCNC: 3.9 MMOL/L — SIGNIFICANT CHANGE UP (ref 3.5–5.3)
POTASSIUM SERPL-SCNC: 3.9 MMOL/L — SIGNIFICANT CHANGE UP (ref 3.5–5.3)
RBC # BLD: 4.04 M/UL — LOW (ref 4.2–5.8)
RBC # FLD: 15.5 % — HIGH (ref 10.3–14.5)
SARS-COV-2 RNA SPEC QL NAA+PROBE: SIGNIFICANT CHANGE UP
SODIUM SERPL-SCNC: 138 MMOL/L — SIGNIFICANT CHANGE UP (ref 135–145)
SPECIMEN SOURCE: SIGNIFICANT CHANGE UP
WBC # BLD: 9.11 K/UL — SIGNIFICANT CHANGE UP (ref 3.8–10.5)
WBC # FLD AUTO: 9.11 K/UL — SIGNIFICANT CHANGE UP (ref 3.8–10.5)

## 2020-05-19 PROCEDURE — 93926 LOWER EXTREMITY STUDY: CPT | Mod: 26,LT

## 2020-05-19 PROCEDURE — 99222 1ST HOSP IP/OBS MODERATE 55: CPT

## 2020-05-19 PROCEDURE — 93923 UPR/LXTR ART STDY 3+ LVLS: CPT | Mod: 26

## 2020-05-19 PROCEDURE — 99222 1ST HOSP IP/OBS MODERATE 55: CPT | Mod: 57

## 2020-05-19 RX ORDER — INSULIN GLARGINE 100 [IU]/ML
6 INJECTION, SOLUTION SUBCUTANEOUS AT BEDTIME
Refills: 0 | Status: DISCONTINUED | OUTPATIENT
Start: 2020-05-19 | End: 2020-05-19

## 2020-05-19 RX ORDER — ISOSORBIDE MONONITRATE 60 MG/1
30 TABLET, EXTENDED RELEASE ORAL DAILY
Refills: 0 | Status: DISCONTINUED | OUTPATIENT
Start: 2020-05-19 | End: 2020-05-20

## 2020-05-19 RX ORDER — DEXTROSE 50 % IN WATER 50 %
25 SYRINGE (ML) INTRAVENOUS ONCE
Refills: 0 | Status: DISCONTINUED | OUTPATIENT
Start: 2020-05-19 | End: 2020-05-20

## 2020-05-19 RX ORDER — PIPERACILLIN AND TAZOBACTAM 4; .5 G/20ML; G/20ML
3.38 INJECTION, POWDER, LYOPHILIZED, FOR SOLUTION INTRAVENOUS EVERY 8 HOURS
Refills: 0 | Status: DISCONTINUED | OUTPATIENT
Start: 2020-05-19 | End: 2020-05-20

## 2020-05-19 RX ORDER — INSULIN LISPRO 100/ML
VIAL (ML) SUBCUTANEOUS AT BEDTIME
Refills: 0 | Status: DISCONTINUED | OUTPATIENT
Start: 2020-05-19 | End: 2020-05-19

## 2020-05-19 RX ORDER — TRAMADOL HYDROCHLORIDE 50 MG/1
50 TABLET ORAL ONCE
Refills: 0 | Status: DISCONTINUED | OUTPATIENT
Start: 2020-05-19 | End: 2020-05-19

## 2020-05-19 RX ORDER — CLOPIDOGREL BISULFATE 75 MG/1
75 TABLET, FILM COATED ORAL DAILY
Refills: 0 | Status: DISCONTINUED | OUTPATIENT
Start: 2020-05-19 | End: 2020-05-20

## 2020-05-19 RX ORDER — ISOPROPYL ALCOHOL, BENZOCAINE .7; .06 ML/ML; ML/ML
1 SWAB TOPICAL
Qty: 100 | Refills: 1
Start: 2020-05-19 | End: 2020-07-07

## 2020-05-19 RX ORDER — PANTOPRAZOLE SODIUM 20 MG/1
40 TABLET, DELAYED RELEASE ORAL
Refills: 0 | Status: DISCONTINUED | OUTPATIENT
Start: 2020-05-19 | End: 2020-05-20

## 2020-05-19 RX ORDER — APIXABAN 2.5 MG/1
5 TABLET, FILM COATED ORAL EVERY 12 HOURS
Refills: 0 | Status: DISCONTINUED | OUTPATIENT
Start: 2020-05-19 | End: 2020-05-19

## 2020-05-19 RX ORDER — BETHANECHOL CHLORIDE 25 MG
25 TABLET ORAL THREE TIMES A DAY
Refills: 0 | Status: DISCONTINUED | OUTPATIENT
Start: 2020-05-19 | End: 2020-05-20

## 2020-05-19 RX ORDER — DEXTROSE 50 % IN WATER 50 %
12.5 SYRINGE (ML) INTRAVENOUS ONCE
Refills: 0 | Status: DISCONTINUED | OUTPATIENT
Start: 2020-05-19 | End: 2020-05-20

## 2020-05-19 RX ORDER — INSULIN LISPRO 100/ML
7 VIAL (ML) SUBCUTANEOUS
Qty: 5 | Refills: 1
Start: 2020-05-19 | End: 2020-07-17

## 2020-05-19 RX ORDER — DEXTROSE 50 % IN WATER 50 %
15 SYRINGE (ML) INTRAVENOUS ONCE
Refills: 0 | Status: DISCONTINUED | OUTPATIENT
Start: 2020-05-19 | End: 2020-05-20

## 2020-05-19 RX ORDER — ATORVASTATIN CALCIUM 80 MG/1
80 TABLET, FILM COATED ORAL AT BEDTIME
Refills: 0 | Status: DISCONTINUED | OUTPATIENT
Start: 2020-05-19 | End: 2020-05-20

## 2020-05-19 RX ORDER — INSULIN LISPRO 100/ML
VIAL (ML) SUBCUTANEOUS AT BEDTIME
Refills: 0 | Status: DISCONTINUED | OUTPATIENT
Start: 2020-05-19 | End: 2020-05-20

## 2020-05-19 RX ORDER — INSULIN GLARGINE 100 [IU]/ML
15 INJECTION, SOLUTION SUBCUTANEOUS AT BEDTIME
Refills: 0 | Status: DISCONTINUED | OUTPATIENT
Start: 2020-05-19 | End: 2020-05-20

## 2020-05-19 RX ORDER — GLUCAGON INJECTION, SOLUTION 0.5 MG/.1ML
1 INJECTION, SOLUTION SUBCUTANEOUS ONCE
Refills: 0 | Status: DISCONTINUED | OUTPATIENT
Start: 2020-05-19 | End: 2020-05-20

## 2020-05-19 RX ORDER — METOPROLOL TARTRATE 50 MG
25 TABLET ORAL
Refills: 0 | Status: DISCONTINUED | OUTPATIENT
Start: 2020-05-19 | End: 2020-05-20

## 2020-05-19 RX ORDER — LOSARTAN POTASSIUM 100 MG/1
25 TABLET, FILM COATED ORAL DAILY
Refills: 0 | Status: DISCONTINUED | OUTPATIENT
Start: 2020-05-19 | End: 2020-05-20

## 2020-05-19 RX ORDER — ENOXAPARIN SODIUM 100 MG/ML
20 INJECTION SUBCUTANEOUS
Qty: 5 | Refills: 0
Start: 2020-05-19 | End: 2020-06-17

## 2020-05-19 RX ORDER — INSULIN LISPRO 100/ML
VIAL (ML) SUBCUTANEOUS
Refills: 0 | Status: DISCONTINUED | OUTPATIENT
Start: 2020-05-19 | End: 2020-05-20

## 2020-05-19 RX ORDER — MORPHINE SULFATE 50 MG/1
2 CAPSULE, EXTENDED RELEASE ORAL ONCE
Refills: 0 | Status: DISCONTINUED | OUTPATIENT
Start: 2020-05-19 | End: 2020-05-19

## 2020-05-19 RX ORDER — SODIUM CHLORIDE 9 MG/ML
1000 INJECTION, SOLUTION INTRAVENOUS
Refills: 0 | Status: DISCONTINUED | OUTPATIENT
Start: 2020-05-19 | End: 2020-05-20

## 2020-05-19 RX ADMIN — Medication 8: at 12:19

## 2020-05-19 RX ADMIN — CLOPIDOGREL BISULFATE 75 MILLIGRAM(S): 75 TABLET, FILM COATED ORAL at 15:15

## 2020-05-19 RX ADMIN — ISOSORBIDE MONONITRATE 30 MILLIGRAM(S): 60 TABLET, EXTENDED RELEASE ORAL at 15:43

## 2020-05-19 RX ADMIN — PIPERACILLIN AND TAZOBACTAM 25 GRAM(S): 4; .5 INJECTION, POWDER, LYOPHILIZED, FOR SOLUTION INTRAVENOUS at 05:44

## 2020-05-19 RX ADMIN — Medication 25 MILLIGRAM(S): at 05:44

## 2020-05-19 RX ADMIN — PIPERACILLIN AND TAZOBACTAM 25 GRAM(S): 4; .5 INJECTION, POWDER, LYOPHILIZED, FOR SOLUTION INTRAVENOUS at 21:23

## 2020-05-19 RX ADMIN — MORPHINE SULFATE 2 MILLIGRAM(S): 50 CAPSULE, EXTENDED RELEASE ORAL at 04:32

## 2020-05-19 RX ADMIN — Medication 25 MILLIGRAM(S): at 21:23

## 2020-05-19 RX ADMIN — PANTOPRAZOLE SODIUM 40 MILLIGRAM(S): 20 TABLET, DELAYED RELEASE ORAL at 05:44

## 2020-05-19 RX ADMIN — APIXABAN 5 MILLIGRAM(S): 2.5 TABLET, FILM COATED ORAL at 05:44

## 2020-05-19 RX ADMIN — Medication 25 MILLIGRAM(S): at 15:41

## 2020-05-19 RX ADMIN — TRAMADOL HYDROCHLORIDE 50 MILLIGRAM(S): 50 TABLET ORAL at 21:23

## 2020-05-19 RX ADMIN — Medication 4: at 16:52

## 2020-05-19 RX ADMIN — Medication 25 MILLIGRAM(S): at 16:52

## 2020-05-19 RX ADMIN — LOSARTAN POTASSIUM 25 MILLIGRAM(S): 100 TABLET, FILM COATED ORAL at 05:44

## 2020-05-19 RX ADMIN — Medication 8: at 08:03

## 2020-05-19 RX ADMIN — APIXABAN 5 MILLIGRAM(S): 2.5 TABLET, FILM COATED ORAL at 16:52

## 2020-05-19 RX ADMIN — INSULIN GLARGINE 15 UNIT(S): 100 INJECTION, SOLUTION SUBCUTANEOUS at 21:24

## 2020-05-19 RX ADMIN — ATORVASTATIN CALCIUM 80 MILLIGRAM(S): 80 TABLET, FILM COATED ORAL at 21:23

## 2020-05-19 RX ADMIN — PIPERACILLIN AND TAZOBACTAM 25 GRAM(S): 4; .5 INJECTION, POWDER, LYOPHILIZED, FOR SOLUTION INTRAVENOUS at 15:41

## 2020-05-19 NOTE — ADVANCED PRACTICE NURSE CONSULT - RECOMMEDATIONS
Type 2 A1c 11.6 % Adm Left DFU  Recommend endocrine-[facility] on consult  FU appt:TBA  Rx insulin/meter and supplies submitted [facility]  Goal 100-180 mg/dL Type 2 A1c 11.6 % Adm Left DFU  Recommend endocrine-[facility]lman on consult  FU appt:TBA  Rx insulin/meter and supplies submitted [facility]  Lantus dose increased to 15 unit HS (80% home HS dose)  HS corrective scale increased to moderate.  Goal 100-180 mg/dL

## 2020-05-19 NOTE — H&P ADULT - PROBLEM SELECTOR PLAN 1
Continue with Rajeev and Glenn.   ID consult. Podiatry consult called, follow up recs.  Follow up cultures.   Trend down ESR and CRP.   Pain control.

## 2020-05-19 NOTE — ADVANCED PRACTICE NURSE CONSULT - REASON FOR CONSULT
49y    Male    Patient is a 49y old  Male who presents with a chief complaint of left foot osteomylitis (19 May 2020 15:23)      Type:2 a1c 11.6% last a1c 8.4% DEc 2019. states a1c went up due to pandemic. was unable to fill Rx and see PCP.  known complications: DFU. PCP Marisela. Rx home: Lantus 40 u HS and admelog 7units AC.  Needs all insulin/meter and supplies.    HPI:  49 male presents to ER c/o left foot pain, states he has neuropathy, may have stepped onto something that caused an abrasion, developed into swelling, redness and pain, worse with palpation and walking, patient was reluctant to come into the hospital due to the corona virus,  In the ed patient had X ray soft tissue air and bony erosion around the distal fifth metatarsal highly concerning for osteomyelitis. (19 May 2020 00:17)      PAST MEDICAL & SURGICAL HISTORY:  Cerebrovascular accident  CAD S/P percutaneous coronary angioplasty  Osteomyelitis: s/p debridement  History of non-ST elevation myocardial infarction (NSTEMI)  Pulmonary embolism  Perforated gastric ulcer: s/p emergent ex-lap omentopexy and plication 6/2019  BPH (benign prostatic hyperplasia)  Hypertension  Afib  Diabetes mellitus with no complication  Diabetes  Traumatic amputation of left foot, initial encounter  Perforated gastric ulcer  H/O abdominal surgery      MEDICATIONS  (STANDING):  apixaban 5 milliGRAM(s) Oral every 12 hours  bethanechol 25 milliGRAM(s) Oral three times a day  clopidogrel Tablet 75 milliGRAM(s) Oral daily  dextrose 5%. 1000 milliLiter(s) (50 mL/Hr) IV Continuous <Continuous>  dextrose 50% Injectable 12.5 Gram(s) IV Push once  dextrose 50% Injectable 25 Gram(s) IV Push once  dextrose 50% Injectable 25 Gram(s) IV Push once  insulin glargine Injectable (LANTUS) 6 Unit(s) SubCutaneous at bedtime  insulin lispro (HumaLOG) corrective regimen sliding scale   SubCutaneous three times a day before meals  insulin lispro (HumaLOG) corrective regimen sliding scale   SubCutaneous at bedtime  isosorbide   mononitrate ER Tablet (IMDUR) 30 milliGRAM(s) Oral daily  losartan 25 milliGRAM(s) Oral daily  methimazole 10 milliGRAM(s) Oral daily  metoprolol tartrate 25 milliGRAM(s) Oral two times a day  pantoprazole    Tablet 40 milliGRAM(s) Oral before breakfast  piperacillin/tazobactam IVPB.. 3.375 Gram(s) IV Intermittent every 8 hours 49y    Male    Patient is a 49y old  Male who presents with a chief complaint of left foot osteomylitis (19 May 2020 15:23)      Type:2 a1c 11.6% last a1c 8.4% DEc 2019. states a1c went up due to pandemic. was unable to fill Rx and see PCP.  known complications: DFU. PCP Marisela. Rx home: Lantus 40 u HS (20 units x2 daily) and admelog 7units AC.  Needs all insulin/meter and supplies.    HPI:  49 male presents to ER c/o left foot pain, states he has neuropathy, may have stepped onto something that caused an abrasion, developed into swelling, redness and pain, worse with palpation and walking, patient was reluctant to come into the hospital due to the corona virus,  In the ed patient had X ray soft tissue air and bony erosion around the distal fifth metatarsal highly concerning for osteomyelitis. (19 May 2020 00:17)      PAST MEDICAL & SURGICAL HISTORY:  Cerebrovascular accident  CAD S/P percutaneous coronary angioplasty  Osteomyelitis: s/p debridement  History of non-ST elevation myocardial infarction (NSTEMI)  Pulmonary embolism  Perforated gastric ulcer: s/p emergent ex-lap omentopexy and plication 6/2019  BPH (benign prostatic hyperplasia)  Hypertension  Afib  Diabetes mellitus with no complication  Diabetes  Traumatic amputation of left foot, initial encounter  Perforated gastric ulcer  H/O abdominal surgery      MEDICATIONS  (STANDING):  apixaban 5 milliGRAM(s) Oral every 12 hours  bethanechol 25 milliGRAM(s) Oral three times a day  clopidogrel Tablet 75 milliGRAM(s) Oral daily  dextrose 5%. 1000 milliLiter(s) (50 mL/Hr) IV Continuous <Continuous>  dextrose 50% Injectable 12.5 Gram(s) IV Push once  dextrose 50% Injectable 25 Gram(s) IV Push once  dextrose 50% Injectable 25 Gram(s) IV Push once  insulin glargine Injectable (LANTUS) 6 Unit(s) SubCutaneous at bedtime  insulin lispro (HumaLOG) corrective regimen sliding scale   SubCutaneous three times a day before meals  insulin lispro (HumaLOG) corrective regimen sliding scale   SubCutaneous at bedtime  isosorbide   mononitrate ER Tablet (IMDUR) 30 milliGRAM(s) Oral daily  losartan 25 milliGRAM(s) Oral daily  methimazole 10 milliGRAM(s) Oral daily  metoprolol tartrate 25 milliGRAM(s) Oral two times a day  pantoprazole    Tablet 40 milliGRAM(s) Oral before breakfast  piperacillin/tazobactam IVPB.. 3.375 Gram(s) IV Intermittent every 8 hours

## 2020-05-19 NOTE — CONSULT NOTE ADULT - SUBJECTIVE AND OBJECTIVE BOX
HPI:  49 male presents to ER c/o left foot pain, states he has neuropathy, may have stepped onto something that caused an abrasion, developed into swelling, redness and pain, worse with palpation and walking, patient was reluctant to come into the hospital due to the corona virus,  In the ed patient had X ray soft tissue air and bony erosion around the distal fifth metatarsal highly concerning for osteomyelitis. (19 May 2020 00:17)      PAST MEDICAL & SURGICAL HISTORY:  Cerebrovascular accident  CAD S/P percutaneous coronary angioplasty  Osteomyelitis: s/p debridement  History of non-ST elevation myocardial infarction (NSTEMI)  Pulmonary embolism  Perforated gastric ulcer: s/p emergent ex-lap omentopexy and plication 2019  BPH (benign prostatic hyperplasia)  Hypertension  Afib  Diabetes mellitus with no complication  Diabetes  Traumatic amputation of left foot, initial encounter  Perforated gastric ulcer  H/O abdominal surgery      Antimicrobials  piperacillin/tazobactam IVPB.. 3.375 Gram(s) IV Intermittent every 8 hours      Immunological      Other  apixaban 5 milliGRAM(s) Oral every 12 hours  bethanechol 25 milliGRAM(s) Oral three times a day  clopidogrel Tablet 75 milliGRAM(s) Oral daily  dextrose 40% Gel 15 Gram(s) Oral once PRN  dextrose 5%. 1000 milliLiter(s) IV Continuous <Continuous>  dextrose 50% Injectable 12.5 Gram(s) IV Push once  dextrose 50% Injectable 25 Gram(s) IV Push once  dextrose 50% Injectable 25 Gram(s) IV Push once  glucagon  Injectable 1 milliGRAM(s) IntraMuscular once PRN  insulin glargine Injectable (LANTUS) 6 Unit(s) SubCutaneous at bedtime  insulin lispro (HumaLOG) corrective regimen sliding scale   SubCutaneous three times a day before meals  insulin lispro (HumaLOG) corrective regimen sliding scale   SubCutaneous at bedtime  isosorbide   mononitrate ER Tablet (IMDUR) 30 milliGRAM(s) Oral daily  losartan 25 milliGRAM(s) Oral daily  methimazole 10 milliGRAM(s) Oral daily  metoprolol tartrate 25 milliGRAM(s) Oral two times a day  pantoprazole    Tablet 40 milliGRAM(s) Oral before breakfast      Allergies    fish (Hives)  No Known Drug Allergies    Intolerances        SOCIAL HISTORY:  Social History:      FAMILY HISTORY:  FH: stroke: Father  FH: coronary artery disease: Father  FH: pulmonary embolism: Mother      ROS:    EYES:  Negative  blurry vision or double vision  GASTROINTESTINAL:  Negative for nausea, vomiting, diarrhea  -otherwise negative except for subjective    Vital Signs Last 24 Hrs  T(C): 36.8 (19 May 2020 04:05), Max: 37.2 (19 May 2020 03:46)  T(F): 98.2 (19 May 2020 04:05), Max: 98.9 (19 May 2020 03:46)  HR: 76 (19 May 2020 04:05) (73 - 92)  BP: 155/74 (19 May 2020 04:05) (130/71 - 155/74)  BP(mean): --  RR: 17 (19 May 2020 04:05) (17 - 23)  SpO2: 93% (19 May 2020 04:05) (93% - 97%)    PE:  WDWN in no distress  HEENT:  NC, PERRL, sclerae anicteric, conjunctivae clear, EOMI.  Sinuses nontender, no nasal exudate.  No buccal or pharyngeal lesions, erythema or exudate  Neck:  Supple, no adenopathy  Lungs:  No accessory muscle use, breathing comfortably  Cor:  distant  Abd:  Symmetric, normoactive BS.  Soft, nontender, no masses, guarding or rebound.  Liver and spleen not enlarged  Extrem:  left foot with grossly purulent drainage, swollen, red  Musc: moving all limbs freely, no focal deficits    LABS:                        11.0   9.11  )-----------( 326      ( 19 May 2020 07:08 )             35.1       WBC Count: 9.11 K/uL (20 @ 07:08)  WBC Count: 9.38 K/uL (20 @ 21:21)          138  |  104  |  8   ----------------------------<  319<H>  3.9   |  26  |  0.92    Ca    8.7      19 May 2020 07:08  Mg     1.9         TPro  7.0  /  Alb  2.2<L>  /  TBili  0.2  /  DBili  x   /  AST  19  /  ALT  25  /  AlkPhos  114        Creatinine, Serum: 0.92 mg/dL (20 @ 07:08)  Creatinine, Serum: 0.95 mg/dL (20 @ 21:21)      Urinalysis Basic - ( 18 May 2020 21:56 )    Color: Yellow / Appearance: Slightly Turbid / S.010 / pH: x  Gluc: x / Ketone: Trace  / Bili: Negative / Urobili: Negative   Blood: x / Protein: 15 / Nitrite: Negative   Leuk Esterase: Moderate / RBC: 6-10 /HPF / WBC 26-50   Sq Epi: x / Non Sq Epi: Occasional / Bacteria: Few              MICROBIOLOGY:      RADIOLOGY & ADDITIONAL STUDIES:    --

## 2020-05-19 NOTE — CONSULT NOTE ADULT - SUBJECTIVE AND OBJECTIVE BOX
Surgical PA consult note on behalf of Dr. Breen/Vascular Attending:        HPI:  Fifi, 50 yo male with h/o A-fib, poorly controlled DM, diabetic neuropathy BLE, HTN, CAD (s/p RCA angioplasty ), CVA, cerebral aneurysm x 2 (s/p coiling/stenting), NSTEMI, perforated gastric ulcer (s/p omentopexy ), bilat PE , on AC (Eliquis, Plavix, ASA), and prior left foot OM (s/p great toe amputation), presents to ER yesterday evening with c/o left foot pain, swelling, and foul-smelling/purulent discharge.  He reports that he may have stepped on something that caused a plantar foot wound approx. 10 days ago, which has since developed into swelling, redness and pain - worse with palpation and walking.  Denies any red streaking extending up the left leg from the foot.   Denies CP, SOB, DUBOIS, palpitations, dizziness, fevers, chills, nausea/vomiting, changes in bowel or bladder habits, urinary complaints or issues.  Baseline ambulation with walker 2/2 CVA and bilat LE neuropathy.  He was fearful to present to ED due to COVID pandemic.  Denies recent travel or sick contacts.       PAST MEDICAL & SURGICAL HISTORY:  Cerebrovascular accident  CAD S/P percutaneous coronary angioplasty  Osteomyelitis: s/p debridement  History of non-ST elevation myocardial infarction (NSTEMI)  Pulmonary embolism  Perforated gastric ulcer: s/p emergent ex-lap omentopexy and plication 2019  BPH (benign prostatic hyperplasia)  Hypertension  Afib  Diabetes mellitus with no complication  Diabetes  Traumatic amputation of left foot, initial encounter  Perforated gastric ulcer  H/O abdominal surgery    REVIEW OF SYSTEMS:  CONSTITUTIONAL: No weakness, fevers or chills, weight loss  EYES/ENT: No visual changes;  dizziness or throat pain   NECK: No pain or stiffness/ swelling  RESPIRATORY: No cough, wheezing, hemoptysis; No shortness of breath  CARDIOVASCULAR: No chest pain or palpitations, Dyspnea on exertion  GASTROINTESTINAL: No abdominal or epigastric pain. No nausea, vomiting, or hematemesis; No diarrhea or constipation. No melena or hematochezia.  GENITOURINARY: No dysuria, frequency or hematuria  NEUROLOGICAL: No numbness or weakness  SKIN: No itching, rashes  VASCULAR: no rest pain, claudication, calf tenderness     MEDICATIONS  (STANDING):  apixaban 5 milliGRAM(s) Oral every 12 hours  bethanechol 25 milliGRAM(s) Oral three times a day  clopidogrel Tablet 75 milliGRAM(s) Oral daily  dextrose 5%. 1000 milliLiter(s) (50 mL/Hr) IV Continuous <Continuous>  dextrose 50% Injectable 12.5 Gram(s) IV Push once  dextrose 50% Injectable 25 Gram(s) IV Push once  dextrose 50% Injectable 25 Gram(s) IV Push once  insulin glargine Injectable (LANTUS) 6 Unit(s) SubCutaneous at bedtime  insulin lispro (HumaLOG) corrective regimen sliding scale   SubCutaneous three times a day before meals  insulin lispro (HumaLOG) corrective regimen sliding scale   SubCutaneous at bedtime  isosorbide   mononitrate ER Tablet (IMDUR) 30 milliGRAM(s) Oral daily  losartan 25 milliGRAM(s) Oral daily  methimazole 10 milliGRAM(s) Oral daily  metoprolol tartrate 25 milliGRAM(s) Oral two times a day  pantoprazole    Tablet 40 milliGRAM(s) Oral before breakfast  piperacillin/tazobactam IVPB.. 3.375 Gram(s) IV Intermittent every 8 hours    MEDICATIONS  (PRN):  dextrose 40% Gel 15 Gram(s) Oral once PRN Blood Glucose LESS THAN 70 milliGRAM(s)/deciliter  glucagon  Injectable 1 milliGRAM(s) IntraMuscular once PRN Glucose LESS THAN 70 milligrams/deciliter    ALLERGIES:  No Known Drug Allergies  Hives with eating fish    SOCIAL HISTORY:  Non-smoker, denies ETOH, denies illicit drug use    VITALS SIGNS:  T(C): 36.6 (19 May 2020 13:48), Max: 37.2 (19 May 2020 03:46)  T(F): 97.8 (19 May 2020 13:48), Max: 98.9 (19 May 2020 03:46)  HR: 71 (19 May 2020 16:47) (71 - 92)  BP: 128/68 (19 May 2020 16:47) (125/76 - 155/74)  BP(mean): --  RR: 16 (19 May 2020 13:48) (16 - 23)  SpO2: 92% (19 May 2020 13:48) (92% - 97%)    PHYSICAL EXAM:  General: A+O x 3 in NAD  HEENT: PERRLA, EOM intact  Neck: trachea midline  Chest: Clear through auscultation bilaterally, No rales, rhonchi, mild expiratory wheezes noted bilat in all fields, breathing comfortably without accessory muscle use  Heart: S1,S1 RRR, no murmurs noted  Abdomen: Soft, non distended, non tympanic, BS x 4, no guarding noted.  Old, healed midline incision.  Moderate size umbilical hernia, non-tender/non-pulsatile.    Extremities: 1-2+ pitting edema bilat LE's, warm,  no calf tenderness bilat.    Left foot: S/P prior great toe amputation/medial foot debridement scar.  Approx. 3cm diameter plantar distal foot with eschar, foul-smelling purulent discharge, and surrounding skin hypopigmentation.  Purulent, foul-smelling discharge with smaller ulcerations through the remaining toes of the left foot.  Small, round abrasion/excoriation in region of medial malleolus.  Diffuse STS left foot/ankle.  Hyperemic discoloration with hypertrophic skin noted left LE c/w venous insufficiency.  Faintly palpable DP, non-palpable PT.  Markedly diminished sensation through the remaining toes.  Right foot: Superficial ulcerations foot/anterior distal LE.  Hyperemic discoloration throughout without maribell erythema.  No purulence or drainage noted.  Hyperemic discoloration with hypertrophic skin noted RLE and foot c/w venous insufficiency.  Palpable DP/PT pulses.  Decreased sensation to all 5 toes and forefoot.         LABS:                        11.0   9.11  )-----------( 326      ( 19 May 2020 07:08 )             35.1         138  |  104  |  8   ----------------------------<  319<H>  3.9   |  26  |  0.92    Ca    8.7      19 May 2020 07:08  Mg     1.9         TPro  7.0  /  Alb  2.2<L>  /  TBili  0.2  /  DBili  x   /  AST  19  /  ALT  25  /  AlkPhos  114      PT/INR - ( 18 May 2020 21:21 )   PT: 12.4 sec;   INR: 1.10 ratio         PTT - ( 18 May 2020 21:21 )  PTT:30.4 sec  Urinalysis Basic - ( 18 May 2020 21:56 )    Color: Yellow / Appearance: Slightly Turbid / S.010 / pH: x  Gluc: x / Ketone: Trace  / Bili: Negative / Urobili: Negative   Blood: x / Protein: 15 / Nitrite: Negative   Leuk Esterase: Moderate / RBC: 6-10 /HPF / WBC 26-50   Sq Epi: x / Non Sq Epi: Occasional / Bacteria: Few        RADIOLOGY & ADDITIONAL STUDIES:    EXAM:  US DPLX LWR EXT VEINS LTD LT                        PROCEDURE DATE:  2020    INTERPRETATION:  LEFT LOWER EXTREMITY VENOUS ULTRASOUND  INDICATION: Left foot cellulitis. Evaluate for deep venous thrombosis (DVT).  TECHNIQUE: Grayscale, color and spectral Doppler evaluation of the left lower extremity with graded compression maneuvers was performed.  COMPARISON: Bilateral lower extremity venous ultrasound 2019.  FINDINGS:   Normal waveforms and compressibility are demonstrated in the left common femoral, superficial femoral, and popliteal veins. No thrombus is demonstrated. Contralateral common femoral vein is compressible.  Doppler examination shows normal spontaneous and phasic flow.  Suboptimally visualized calf veins with patent posterior tibial vein.  IMPRESSION:    No evidence of DVT in the left lower extremity allowing for the limitation of the study.  KEVIN LOVE M.D., ATTENDING RADIOLOGIST  This document has been electronically signed. May 19 2020 12:19AM      EXAM:  PHYSIOL EXTREM LOW 3+ LEV BI                        PROCEDURE DATE:  2020    INTERPRETATION:  Clinical information: History smoking, diabetes, hypertension, hyperlipidemia, presents with left foot ulcer.  Comparison: None available.  Technique: Lower extremity arterial Doppler study. Left toe brachial index and left digital PVR are unobtainable due to ulceration.  Findings: Ankle brachial index measures 1.16 bilaterally. No segmental arterial pressure gradient is detected. Toe brachial index measures 1.67 on the right.  PVR waveforms are normal in amplitude and pulsatility bilaterally.  Impression: No Doppler evidence of hemodynamically significant arterial flow limitation in either lower extremity.  NIC WHITE M.D., ATTENDING RADIOLOGIST  This document has been electronically signed. May 19 2020  3:57PM    EXAM:  DUPLEX LOW ARTERIES UNI LTD LT                        PROCEDURE DATE:  2020    INTERPRETATION:  Clinical information: History of smoking, diabetes, hypertension, hyperlipidemia, presents with left foot ulcer.  Comparison: Lower extremity arterial Doppler study dated 2020.  Technique: Left lower extremity arterial duplex study.  Findings: Ankle brachial index measures 1.16 bilaterally.  Duplex evaluation of the distributive arteries of the left lower extremitywas performed. The arteries are patent. Mild intimal plaque is noted. Triphasic arterial waveforms are present in the upper thigh. Monophasic waveforms are present in the lower thigh and calf. No disturbed color-flow or elevated blood flow velocitiesare detected.  Peak systolic velocities are as follows:  Left lower extremity  Common femoral artery 107 cm/s  Superficial femoral artery proximal 101 cm/s, mid 96 cm/s, distal 81 cm/s  Popliteal artery 99 cm/s  Posterior tibial artery 62 cm/s  Anterior tibial artery 90 cm/s  Peroneal artery not visualized  Dorsalis pedis artery 57 cm/s  Impression: No duplex evidence of hemodynamically significant stenosis or occlusion in the arteries of the left lower extremity.  NIC WHITE M.D., ATTENDING RADIOLOGIST  This document has been electronically signed. May 19 2020  4:02PM    EXAM:  FOOT LEFT (MINIMUM 3 VIEWS)                        EXAM:  LEG AP&LAT - LEFT                        PROCEDURE DATE:  2020    INTERPRETATION:  Left tib-fib and left foot. Patient has local cellulitis.  Left tib-fib. 4 views.  There is rather mild knee degeneration.  There is mild edema at the ankle.  There is mild degeneration of the malleolar tips and some degeneration at the tibiotalar articulation medially.  No bone destruction or fracture is evident.  Leftfoot. 3 views.  There is been amputation at the first mid metatarsal level.  There is penciling of the distal fifth metacarpal with erosion. This suggests possible infection.  There is degeneration at the second MTP joint.  Air density is seen in the soft tissues around the base of the fourth toe highly suggestive of local infection.  IMPRESSION: There are is soft tissue air and bony erosion around the distal fifth metatarsal highly concerning for osteomyelitis. Other findings as above.  ABHISHEK ESPINO M.D., ATTENDING RADIOLOGIST  This document has been electronically signed. May 18 2020  7:36PM          ASSESSMENT:  50 yo male with h/o A-fib, poorly controlled DM, diabetic neuropathy BLE, HTN, CAD (s/p RCA angioplasty ), CVA, cerebral aneurysm x 2 (s/p coiling/stenting), NSTEMI, perforated gastric ulcer (s/p omentopexy ), bilat PE , on AC (Eliquis, Plavix, ASA), and prior left foot OM (s/p great toe amputation), presents on  with left plantar foot ulcer with likely OM      PLAN:  Discussed with Dr. Breen.  At this time, there is no objection from a Vascular standpoint for the proposed trans-metatarsal amputation of the left foot for source control of the infection.  Based on vascular flow studies performed, this appears to be a microvascular issue 2/2 the poorly controlled DM - no revascularization procedures are required at this time.  Would continue ABx throughout per ID recommendations.  AC, rate control medications per Cardiology recommendations.  Control of BG as per Endocrinology/Diabetic NP specialist.  No interventions necessary from Vascular standpoint.  Would follow on PRN basis.

## 2020-05-19 NOTE — CONSULT NOTE ADULT - ASSESSMENT
49 male w PMH sig for DM<, CADneuropathy, adm with purulent left foot highly concerning for osteomyelitis.

## 2020-05-19 NOTE — CONSULT NOTE ADULT - SUBJECTIVE AND OBJECTIVE BOX
HPI:  49y year old Male seen at [facility] [address] for left foot infection. Patient relates that he may have stepped on something within the past week as he noticed increased redness, swelling, and pain. Patient relates he has not been trying to stay away from medical facilities due to the covid pandemic. Denies any fever, chills, nausea, vomiting, chest pain, shortness of breath, or calf pain at this time.    PAST MEDICAL & SURGICAL HISTORY:  Cerebrovascular accident  CAD S/P percutaneous coronary angioplasty  Osteomyelitis: s/p debridement  History of non-ST elevation myocardial infarction (NSTEMI)  Pulmonary embolism  Perforated gastric ulcer: s/p emergent ex-lap omentopexy and plication 6/2019  BPH (benign prostatic hyperplasia)  Hypertension  Afib  Diabetes mellitus with no complication  Diabetes  Traumatic amputation of left foot, initial encounter  Perforated gastric ulcer  H/O abdominal surgery    Allergies  fish (Hives)  No Known Drug Allergies    MEDICATIONS  (STANDING):  apixaban 5 milliGRAM(s) Oral every 12 hours  bethanechol 25 milliGRAM(s) Oral three times a day  clopidogrel Tablet 75 milliGRAM(s) Oral daily  dextrose 5%. 1000 milliLiter(s) (50 mL/Hr) IV Continuous <Continuous>  dextrose 50% Injectable 12.5 Gram(s) IV Push once  dextrose 50% Injectable 25 Gram(s) IV Push once  dextrose 50% Injectable 25 Gram(s) IV Push once  insulin glargine Injectable (LANTUS) 6 Unit(s) SubCutaneous at bedtime  insulin lispro (HumaLOG) corrective regimen sliding scale   SubCutaneous three times a day before meals  insulin lispro (HumaLOG) corrective regimen sliding scale   SubCutaneous at bedtime  isosorbide   mononitrate ER Tablet (IMDUR) 30 milliGRAM(s) Oral daily  losartan 25 milliGRAM(s) Oral daily  methimazole 10 milliGRAM(s) Oral daily  metoprolol tartrate 25 milliGRAM(s) Oral two times a day  pantoprazole    Tablet 40 milliGRAM(s) Oral before breakfast  piperacillin/tazobactam IVPB.. 3.375 Gram(s) IV Intermittent every 8 hours    MEDICATIONS  (PRN):  dextrose 40% Gel 15 Gram(s) Oral once PRN Blood Glucose LESS THAN 70 milliGRAM(s)/deciliter  glucagon  Injectable 1 milliGRAM(s) IntraMuscular once PRN Glucose LESS THAN 70 milligrams/decilite    FAMILY HISTORY:  FH: stroke: Father  FH: coronary artery disease: Father  FH: pulmonary embolism: Mother    Vital Signs Last 24 Hrs  T(C): 36.8 (19 May 2020 04:05), Max: 37.2 (19 May 2020 03:46)  T(F): 98.2 (19 May 2020 04:05), Max: 98.9 (19 May 2020 03:46)  HR: 76 (19 May 2020 04:05) (73 - 92)  BP: 155/74 (19 May 2020 04:05) (130/71 - 155/74)  BP(mean): --  RR: 17 (19 May 2020 04:05) (17 - 23)  SpO2: 93% (19 May 2020 04:05) (93% - 97%)    PHYSICAL EXAM:  Vascular: DP & PT nonpalpable bilaterally, Capillary refill 5 seconds  Neurological: Light touch sensation not intact bilaterally  Musculoskeletal: 5/5 strength in all quadrants bilaterally, AJ & STJ ROM intact, s/p left 1st partial ray amputation and 5th metatarsal head amputation  Dermatological: Left foot submet 2, 3, and 4 ulceration noted, probing to bone, erythema noted to the forefoot, malodor noted, serous drainage noted, no proximal streaking at this time                          11.0   9.11  )-----------( 326      ( 19 May 2020 07:08 )             35.1     05-19    138  |  104  |  8   ----------------------------<  319<H>  3.9   |  26  |  0.92    Ca    8.7      19 May 2020 07:08  Mg     1.9     05-18    TPro  7.0  /  Alb  2.2<L>  /  TBili  0.2  /  DBili  x   /  AST  19  /  ALT  25  /  AlkPhos  114  05-18      PT/INR - ( 18 May 2020 21:21 )   PT: 12.4 sec;   INR: 1.10 ratio         PTT - ( 18 May 2020 21:21 )  PTT:30.4 sec          Imaging: Left foot 3 view radiographs shows s/p 1st partial ray and 5th metatarsal head amputation, soft tissue defect noted submet 2, 3, 4

## 2020-05-19 NOTE — CONSULT NOTE ADULT - ASSESSMENT
48 yo male with PMHx significant of CAD s/p BMS to RCA (8/2019), RUL subsegmental PE (6/2019, on Eliquis), hx of NSTEMI and PAF s/p ex-lap omentopexy and plication for perforated antral ulcer (5/2019), osteomyelitis s/p debridement, patient had X ray soft tissue air and bony erosion around the distal fifth metatarsal highly concerning for osteomyelitis. Plan is for left toe amputation CVA s/p tPA (8/2019), HTN and DM2 (not on insulin)     Cardiac Optimization:  EKG is pending. There is no active chest pain. No symptomatic valvular disease. No arrythmia, euvolemic on exam.    Afib  - continue to anticoagulate with eliquis 5mg po bid  - Continue with Lopressor BID with hold parameters  - Echo 12/24 as delineated above revealing severe LVH, normal systolic function ef 65% grade 2 diastolic dysfunction, mild MR, trace TR     CAD s/p PCI  - No symptoms suggestive of ischemia  -hold asa (triple therapy would be great bleed risk)  - Continue Plavix 75 QD   - Continue Statin  - Continue Imdur  - Continue ARB and beta blocker 48 yo male with PMHx significant of CAD s/p BMS to RCA (8/2019), RUL subsegmental PE (6/2019, on Eliquis), hx of NSTEMI and PAF s/p ex-lap omentopexy and plication for perforated antral ulcer (5/2019), osteomyelitis s/p debridement, patient had X ray soft tissue air and bony erosion around the distal fifth metatarsal highly concerning for osteomyelitis. Plan is for left toe amputation CVA s/p tPA (8/2019), HTN and DM2 (not on insulin)     Cardiac Optimization:  EKG is pending. There is no active chest pain. No symptomatic valvular disease. No arrythmia, euvolemic on exam.  WIll evaluate EKG prior to cardiac clearance.  Afib  - continue to anticoagulate with eliquis 5mg po bid  - Continue with Lopressor BID with hold parameters  - Echo 12/24 as delineated above revealing severe LVH, normal systolic function ef 65% grade 2 diastolic dysfunction, mild MR, trace TR     CAD s/p PCI  - No symptoms suggestive of ischemia  -hold asa (triple therapy would be great bleed risk)  - Continue Plavix 75 QD   - Continue Statin  - Continue Imdur  - Continue ARB and beta blocker 50 yo male with PMHx significant of CAD s/p BMS to RCA (8/2019), RUL subsegmental PE (6/2019, on Eliquis), hx of NSTEMI and PAF s/p ex-lap omentopexy and plication for perforated antral ulcer (5/2019), osteomyelitis s/p debridement, patient had X ray soft tissue air and bony erosion around the distal fifth metatarsal highly concerning for osteomyelitis. Plan is for left toe amputation CVA s/p tPA (8/2019), HTN and DM2 (not on insulin)     Cardiac Optimization:  EKG is pending. There is no active chest pain. No symptomatic valvular disease. No arrythmia, euvolemic on exam. Patient can stop the plavix and eliquis prior to toe amputation, if needed.  WIll evaluate EKG prior to cardiac clearance.    Afib  - currently anticoagulated with eliquis 5mg po bid  - Continue with Lopressor BID with hold parameters  - Echo 12/24 as delineated above revealing severe LVH, normal systolic function ef 65% grade 2 diastolic dysfunction, mild MR, trace TR     CAD s/p PCI  - No symptoms suggestive of ischemia  -hold asa (triple therapy would be great bleed risk)  - currently on  Plavix 75 QD   - Continue Statin  - Continue Imdur  - Continue ARB and beta blocker 50 yo male with PMHx significant of CAD s/p BMS to RCA (8/2019), RUL subsegmental PE (6/2019, on Eliquis), hx of NSTEMI and PAF s/p ex-lap omentopexy and plication for perforated antral ulcer (5/2019), osteomyelitis s/p debridement, patient had X ray soft tissue air and bony erosion around the distal fifth metatarsal highly concerning for osteomyelitis. Plan is for left toe amputation CVA s/p tPA (8/2019), HTN and DM2 (not on insulin)     Cardiac Optimization:   There is no active chest pain. No symptomatic valvular disease. No arrythmia, euvolemic on exam. Patient can stop the plavix and eliquis prior to toe amputation, if needed.      Afib  - currently anticoagulated with eliquis 5mg po bid  - Continue with Lopressor BID with hold parameters  - Echo 12/24 as delineated above revealing severe LVH, normal systolic function ef 65% grade 2 diastolic dysfunction, mild MR, trace TR     CAD s/p PCI  - No symptoms suggestive of ischemia  -hold asa (triple therapy would be great bleed risk)  - currently on  Plavix 75 QD   - Continue Statin  - Continue Imdur  - Continue ARB and beta blocker 50 yo male with PMHx significant of CAD s/p BMS to RCA (8/2019), RUL subsegmental PE (6/2019, on Eliquis), hx of NSTEMI and PAF s/p ex-lap omentopexy and plication for perforated antral ulcer (5/2019), osteomyelitis s/p debridement, patient had X ray soft tissue air and bony erosion around the distal fifth metatarsal highly concerning for osteomyelitis. Plan is for left toe amputation CVA s/p tPA (8/2019), HTN and DM2 (not on insulin)     Cardiac Optimization:   There is no active chest pain. No symptomatic valvular disease. No arrythmia, euvolemic on exam. Patient can hold plavix and eliquis prior to toe amputation, if needed.      Afib  - currently anticoagulated with eliquis 5mg po bid  - Continue with Lopressor BID with hold parameters  - Echo 12/24 as delineated above revealing severe LVH, normal systolic function ef 65% grade 2 diastolic dysfunction, mild MR, trace TR     CAD s/p PCI  - No symptoms suggestive of ischemia  -hold asa (triple therapy would be great bleed risk)  - currently on  Plavix 75 QD   - Continue Statin  - Continue Imdur  - Continue ARB and beta blocker     optimized from a cv perspective for low risk procedure

## 2020-05-19 NOTE — CONSULT NOTE ADULT - SUBJECTIVE AND OBJECTIVE BOX
[facility] - Bolivar Carbajal Grossman, [place], Reynaldo Sweeney, Román Boss  Office Number: [phone]    Initial Consult Note    CHIEF COMPLAINT: Patient is a 49y old  Male who presents with a chief complaint of Left Foot Infection (19 May 2020 13:11)      HPI:  49 male presents to ER c/o left foot pain, states he has neuropathy, may have stepped onto something that caused an abrasion, developed into swelling, redness and pain, worse with palpation and walking, patient was reluctant to come into the hospital due to the corona virus,  In the ed patient had X ray soft tissue air and bony erosion around the distal fifth metatarsal highly concerning for osteomyelitis. (19 May 2020 00:17)    PAST MEDICAL & SURGICAL HISTORY:  Cerebrovascular accident  CAD S/P percutaneous coronary angioplasty  Osteomyelitis: s/p debridement  History of non-ST elevation myocardial infarction (NSTEMI)  Pulmonary embolism  Perforated gastric ulcer: s/p emergent ex-lap omentopexy and plication 2019  BPH (benign prostatic hyperplasia)  Hypertension  Afib  Diabetes mellitus with no complication  Diabetes  Traumatic amputation of left foot, initial encounter  Perforated gastric ulcer  H/O abdominal surgery    SOCIAL HISTORY:  No tobacco, ethanol, or drug abuse.  FAMILY HISTORY:  FH: stroke: Father  FH: coronary artery disease: Father  FH: pulmonary embolism: Mother    No family history of acute MI or sudden cardiac death.  MEDICATIONS  (STANDING):  apixaban 5 milliGRAM(s) Oral every 12 hours  bethanechol 25 milliGRAM(s) Oral three times a day  clopidogrel Tablet 75 milliGRAM(s) Oral daily  dextrose 5%. 1000 milliLiter(s) (50 mL/Hr) IV Continuous <Continuous>  dextrose 50% Injectable 12.5 Gram(s) IV Push once  dextrose 50% Injectable 25 Gram(s) IV Push once  dextrose 50% Injectable 25 Gram(s) IV Push once  insulin glargine Injectable (LANTUS) 6 Unit(s) SubCutaneous at bedtime  insulin lispro (HumaLOG) corrective regimen sliding scale   SubCutaneous three times a day before meals  insulin lispro (HumaLOG) corrective regimen sliding scale   SubCutaneous at bedtime  isosorbide   mononitrate ER Tablet (IMDUR) 30 milliGRAM(s) Oral daily  losartan 25 milliGRAM(s) Oral daily  methimazole 10 milliGRAM(s) Oral daily  metoprolol tartrate 25 milliGRAM(s) Oral two times a day  pantoprazole    Tablet 40 milliGRAM(s) Oral before breakfast  piperacillin/tazobactam IVPB.. 3.375 Gram(s) IV Intermittent every 8 hours    MEDICATIONS  (PRN):  dextrose 40% Gel 15 Gram(s) Oral once PRN Blood Glucose LESS THAN 70 milliGRAM(s)/deciliter  glucagon  Injectable 1 milliGRAM(s) IntraMuscular once PRN Glucose LESS THAN 70 milligrams/deciliter    Allergies    fish (Hives)  No Known Drug Allergies    Intolerances      REVIEW OF SYSTEMS:  CONSTITUTIONAL: No weakness, fevers or chills  EYES/ENT: No visual changes;  No vertigo or throat pain   NECK: No pain or stiffness  RESPIRATORY: No cough, wheezing, hemoptysis; No shortness of breath  CARDIOVASCULAR: No chest pain or palpitations  GASTROINTESTINAL: No abdominal pain. No nausea, vomiting, or hematemesis; No diarrhea or constipation. No melena or hematochezia.  GENITOURINARY: No dysuria, frequency or hematuria  NEUROLOGICAL: No numbness or weakness  SKIN: No itching or rash  All other review of systems is negative unless indicated above  VITAL SIGNS:   Vital Signs Last 24 Hrs  T(C): 36.6 (19 May 2020 13:48), Max: 37.2 (19 May 2020 03:46)  T(F): 97.8 (19 May 2020 13:48), Max: 98.9 (19 May 2020 03:46)  HR: 72 (19 May 2020 13:48) (72 - 92)  BP: 125/76 (19 May 2020 13:48) (125/76 - 155/74)  BP(mean): --  RR: 16 (19 May 2020 13:48) (16 - 23)  SpO2: 92% (19 May 2020 13:48) (92% - 97%)  I&O's Summary    18 May 2020 07:01  -  19 May 2020 07:00  --------------------------------------------------------  IN: 0 mL / OUT: 700 mL / NET: -700 mL      On Exam:  Constitutional: NAD, alert and oriented x 3  Lungs:  Non-labored, bilateral wheeze  Cardiovascular: RRR.  S1 and S2 positive.  No murmurs, rubs, gallops or clicks  Gastrointestinal: Bowel Sounds present, soft, nontender.   Lymph: No peripheral edema. No cervical lymphadenopathy.  Neurological: Alert, no focal deficits  EXTREMITIES: bilateral lower extremity edema, erythema right and left feet  Skin: No rashes or ulcers   Psych:  Mood & affect appropriate.    LABS: All Labs Reviewed:                        11.0   9.11  )-----------( 326      ( 19 May 2020 07:08 )             35.1                         10.8   9.38  )-----------( 303      ( 18 May 2020 21:21 )             34.4     19 May 2020 07:08    138    |  104    |  8      ----------------------------<  319    3.9     |  26     |  0.92   18 May 2020 21:21    135    |  104    |  10     ----------------------------<  345    3.8     |  25     |  0.95     Ca    8.7        19 May 2020 07:08  Ca    8.3        18 May 2020 21:21  Mg     1.9       18 May 2020 21:21    TPro  7.0    /  Alb  2.2    /  TBili  0.2    /  DBili  x      /  AST  19     /  ALT  25     /  AlkPhos  114    18 May 2020 21:21    PT/INR - ( 18 May 2020 21:21 )   PT: 12.4 sec;   INR: 1.10 ratio       < from: TTE Echo Doppler w/o Cont (12.24.19 @ 13:55) >  ROCEDURE DATE:  2019        INTERPRETATION:  INDICATION: Coronary artery disease    Blood Pressure 117/71    Height 187     Weight 93       BSA 2.2    Dimensions:    LA 4.2       Normal Values: 2.0 - 4.0 cm    Ao 4.0        Normal Values: 2.0 - 3.8 cm  SEPTUM 1.6       Normal Values: 0.6 - 1.2 cm  PWT 1.6       Normal Values: 0.6 - 1.1 cm  LVIDd 5.8         Normal Values: 3.0 - 5.6 cm  LVIDs 3.2         Normal Values: 1.8 - 4.0 cm    Derived Variables:  LVMI g/m2  RWT  Fractional Short  Ejection Fraction    Doppler Peak v. AoV= (m/sec)    OBSERVATIONS:    Mitral Valve: normal, mild MR.  Aortic Valve/Aorta: normal trileaflet aortic valve.  Tricuspid Valve: normal with trace TR.  Pulmonic Valve: normal  Left Atrium: Enlarged  Right Atrium: normal  Left Ventricle: Severe concentric LVH with normal systolic function, estimated LVEF of 65%.  Right Ventricle: normal size and systolic function.  Pericardium/Pleura: no significant pleural effusion, no significant pericardial effusion.  Pulmonary/RV Pressure: Inadequate TR jet to estimate PA systolic pressure  LV Diastolic Function: Grade 2diastolic dysfunction.    Severe concentric LVH, and mild LV enlargement, with normal systolic function, estimated LVEF of 65%. Normal right ventricular size and systolic function. Grade 2diastolic dysfunction. Left atrial enlargement The aortic root is normal in size. The mitral valve is structurally normal, mild MR. The aortic valve is trileaflet without stenosis or insufficiency. Trace physiologic TR. Inadequate TR jet to estimate PA systolic pressure No significant pericardial effusion.                  JOVANNI ALVAREZ M.D., ATTENDING CARDIOLOGIST  This document has been electronically signed. Dec 25 2019 11:45AM        < from: Cardiac Cath Lab - Adult (19 @ 08:55) >  [facility]  Department of Cardiology  [address] [place] [address]  [phone]  Cath Lab Report -- Comprehensive Report  Patient: SARAH MORRISON  Study date: 2019  Account number: [id number]  MR number: [id number]  : 1970  Gender: Male  Race: W  Case Physician(s):  Laurie Tsai M.D.  Fellow:  Bert Persaud M.D.  Referring Physician:  INDICATIONS: Initial NSTEMI.  HISTORY: There was no prior cardiac history. The patient has hypertension.  PRIOR CARDIOVASCULAR PROCEDURES: None.  PROCEDURE:  --  Intervention on mid RCA: balloon angioplasty, stent, balloon  angioplasty.  TECHNIQUE: The risks and alternatives of the procedures and conscious  sedation were explained to the patient and informed consent was obtained.  Coronary intervention performed electively.  Local anesthetic given. Right radial artery access. A 6FR PRELUDE KIT was  inserted in the vessel. RADIATION EXPOSURE: 5 min. A successful balloon  angioplasty with stent and balloon angioplasty was performed on the 95 %  lesion in the mid RCA. Following intervention there was an excellent  angiographic appearance with a 1 % residual stenosis. This was not a  bifurcation lesion. There was no evidence of the transient no-reflow  phenomenon. There was WILLY 3 flow before the procedure and WILLY 3 flow  after the procedure. Vessel setup was performed. A 6FR JR4 LAUNCHER  guiding catheter was used to intubate the vessel. Vessel setup was  performed. A DIMITRI [id number] wire was used to cross the lesion. Balloon  angioplasty was performed, using a 3.0 X 15 EUPHORA balloon, with 1  inflations and a maximum inflation pressure of 16 kady. A 3.50 X 24MM COBRA  bare-metal stent was placed across the lesion and deployed at a maximum  inflation pressure of 14 kady. Balloon angioplasty was performed, using a  4.00 X 12 NC APEX balloon, with 2 inflations and a maximum inflation  pressure of 18 kady.  CONTRAST GIVEN: Omnipaque 47 ml.  MEDICATIONS GIVEN: Fentanyl, 25 mcg, IV. Midazolam, 1 mg, IV. Verapamil  (Isoptin, Calan, Covera), 2.5 mg, IA. Nitroglycerin, 200 mcg,  intracoronary. Heparin, 3000 units, IA. Heparin, 7000 units, IV.  VENTRICLES: No left ventriculogram was performed.  CORONARY VESSELS: The coronary circulation is right dominant.  RCA:   --  Proximal RCA: There was a 95 % stenosis.  COMPLICATIONS: There were no complications.  INTERVENTIONAL IMPRESSIONS: Successful stenting of the RCA with BMS  INTERVENTIONAL RECOMMENDATIONS: Aspirin for 1 week. Plavix for 3 weeks.  Eliquis for PE (duration TBD) - limit triple therapy for 1 week then  continue with plavix eliquis for 3 weeks. This modified DAPT regimen is  due to his recent perforated ulcer and surgery.  Prepared and signed by  Laurie Tsai M.D.  Signed 2019 13:10:21  HEMODYNAMIC TABLES  Outputs:  Baseline  Outputs:  -- CALCULATIONS[age]  Outputs:  -- CALCULATIONS: Body Surface Area: 2.15  Outputs:  -- CALCULATIONS: Height in cm: 188.00  Outputs:  -- CALCULATIONS: Sex: Male  Outputs:  -- CALCULATIONS: Weight in k.50    < end of copied text >    < end of copied text >    PTT - ( 18 May 2020 21:21 )  PTT:30.4 sec      Blood Culture:         RADIOLOGY:    EKG:    Assessment/Plan:  49y Male    Radha Wood, [facility]  #[id number]  [phone] [facility] - Bolivar Carbajal Grossman, [place]achsman, Reynaldo Sweeney, Román Boss  Office Number: [phone]        CHIEF COMPLAINT: Patient is a 49y old  Male who presents with a chief complaint of Left Foot Infection (19 May 2020 13:11)      HPI:  49 male presents to ER c/o left foot pain, states he has neuropathy, may have stepped onto something that caused an abrasion, developed into swelling, redness and pain, worse with palpation and walking, patient was reluctant to come into the hospital due to the corona virus,  In the ed patient had X ray soft tissue air and bony erosion around the distal fifth metatarsal highly concerning for osteomyelitis. (19 May 2020 00:17)    PAST MEDICAL & SURGICAL HISTORY:  Cerebrovascular accident  CAD S/P percutaneous coronary angioplasty  Osteomyelitis: s/p debridement  History of non-ST elevation myocardial infarction (NSTEMI)  Pulmonary embolism  Perforated gastric ulcer: s/p emergent ex-lap omentopexy and plication 2019  BPH (benign prostatic hyperplasia)  Hypertension  Afib  Diabetes mellitus with no complication  Diabetes  Traumatic amputation of left foot, initial encounter  Perforated gastric ulcer  H/O abdominal surgery    SOCIAL HISTORY:  No tobacco, ethanol, or drug abuse.  FAMILY HISTORY:  FH: stroke: Father  FH: coronary artery disease: Father  FH: pulmonary embolism: Mother    No family history of acute MI or sudden cardiac death.  MEDICATIONS  (STANDING):  apixaban 5 milliGRAM(s) Oral every 12 hours  bethanechol 25 milliGRAM(s) Oral three times a day  clopidogrel Tablet 75 milliGRAM(s) Oral daily  dextrose 5%. 1000 milliLiter(s) (50 mL/Hr) IV Continuous <Continuous>  dextrose 50% Injectable 12.5 Gram(s) IV Push once  dextrose 50% Injectable 25 Gram(s) IV Push once  dextrose 50% Injectable 25 Gram(s) IV Push once  insulin glargine Injectable (LANTUS) 6 Unit(s) SubCutaneous at bedtime  insulin lispro (HumaLOG) corrective regimen sliding scale   SubCutaneous three times a day before meals  insulin lispro (HumaLOG) corrective regimen sliding scale   SubCutaneous at bedtime  isosorbide   mononitrate ER Tablet (IMDUR) 30 milliGRAM(s) Oral daily  losartan 25 milliGRAM(s) Oral daily  methimazole 10 milliGRAM(s) Oral daily  metoprolol tartrate 25 milliGRAM(s) Oral two times a day  pantoprazole    Tablet 40 milliGRAM(s) Oral before breakfast  piperacillin/tazobactam IVPB.. 3.375 Gram(s) IV Intermittent every 8 hours    MEDICATIONS  (PRN):  dextrose 40% Gel 15 Gram(s) Oral once PRN Blood Glucose LESS THAN 70 milliGRAM(s)/deciliter  glucagon  Injectable 1 milliGRAM(s) IntraMuscular once PRN Glucose LESS THAN 70 milligrams/deciliter    Allergies    fish (Hives)  No Known Drug Allergies    Intolerances      REVIEW OF SYSTEMS:  CONSTITUTIONAL: No weakness, fevers or chills  EYES/ENT: No visual changes;  No vertigo or throat pain   NECK: No pain or stiffness  RESPIRATORY: No cough, wheezing, hemoptysis; No shortness of breath  CARDIOVASCULAR: No chest pain or palpitations  GASTROINTESTINAL: No abdominal pain. No nausea, vomiting, or hematemesis; No diarrhea or constipation. No melena or hematochezia.  GENITOURINARY: No dysuria, frequency or hematuria  NEUROLOGICAL: No numbness or weakness  SKIN: No itching or rash  All other review of systems is negative unless indicated above  VITAL SIGNS:   Vital Signs Last 24 Hrs  T(C): 36.6 (19 May 2020 13:48), Max: 37.2 (19 May 2020 03:46)  T(F): 97.8 (19 May 2020 13:48), Max: 98.9 (19 May 2020 03:46)  HR: 72 (19 May 2020 13:48) (72 - 92)  BP: 125/76 (19 May 2020 13:48) (125/76 - 155/74)  BP(mean): --  RR: 16 (19 May 2020 13:48) (16 - 23)  SpO2: 92% (19 May 2020 13:48) (92% - 97%)  I&O's Summary    18 May 2020 07:01  -  19 May 2020 07:00  --------------------------------------------------------  IN: 0 mL / OUT: 700 mL / NET: -700 mL      On Exam:  Constitutional: NAD, alert and oriented x 3  Lungs:  Non-labored, bilateral wheeze  Cardiovascular: RRR.  S1 and S2 positive.  No murmurs, rubs, gallops or clicks  Gastrointestinal: Bowel Sounds present, soft, nontender.   Lymph: No peripheral edema. No cervical lymphadenopathy.  Neurological: Alert, no focal deficits  EXTREMITIES: bilateral lower extremity edema, erythema right and left feet  Skin: No rashes or ulcers   Psych:  Mood & affect appropriate.    LABS: All Labs Reviewed:                        11.0   9.11  )-----------( 326      ( 19 May 2020 07:08 )             35.1                         10.8   9.38  )-----------( 303      ( 18 May 2020 21:21 )             34.4     19 May 2020 07:08    138    |  104    |  8      ----------------------------<  319    3.9     |  26     |  0.92   18 May 2020 21:21    135    |  104    |  10     ----------------------------<  345    3.8     |  25     |  0.95     Ca    8.7        19 May 2020 07:08  Ca    8.3        18 May 2020 21:21  Mg     1.9       18 May 2020 21:21    TPro  7.0    /  Alb  2.2    /  TBili  0.2    /  DBili  x      /  AST  19     /  ALT  25     /  AlkPhos  114    18 May 2020 21:21    PT/INR - ( 18 May 2020 21:21 )   PT: 12.4 sec;   INR: 1.10 ratio       < from: TTE Echo Doppler w/o Cont (12.24.19 @ 13:55) >  ROCEDURE DATE:  2019        INTERPRETATION:  INDICATION: Coronary artery disease    Blood Pressure 117/71    Height 187     Weight 93       BSA 2.2    Dimensions:    LA 4.2       Normal Values: 2.0 - 4.0 cm    Ao 4.0        Normal Values: 2.0 - 3.8 cm  SEPTUM 1.6       Normal Values: 0.6 - 1.2 cm  PWT 1.6       Normal Values: 0.6 - 1.1 cm  LVIDd 5.8         Normal Values: 3.0 - 5.6 cm  LVIDs 3.2         Normal Values: 1.8 - 4.0 cm    Derived Variables:  LVMI g/m2  RWT  Fractional Short  Ejection Fraction    Doppler Peak v. AoV= (m/sec)    OBSERVATIONS:    Mitral Valve: normal, mild MR.  Aortic Valve/Aorta: normal trileaflet aortic valve.  Tricuspid Valve: normal with trace TR.  Pulmonic Valve: normal  Left Atrium: Enlarged  Right Atrium: normal  Left Ventricle: Severe concentric LVH with normal systolic function, estimated LVEF of 65%.  Right Ventricle: normal size and systolic function.  Pericardium/Pleura: no significant pleural effusion, no significant pericardial effusion.  Pulmonary/RV Pressure: Inadequate TR jet to estimate PA systolic pressure  LV Diastolic Function: Grade 2diastolic dysfunction.    Severe concentric LVH, and mild LV enlargement, with normal systolic function, estimated LVEF of 65%. Normal right ventricular size and systolic function. Grade 2diastolic dysfunction. Left atrial enlargement The aortic root is normal in size. The mitral valve is structurally normal, mild MR. The aortic valve is trileaflet without stenosis or insufficiency. Trace physiologic TR. Inadequate TR jet to estimate PA systolic pressure No significant pericardial effusion.                  JOVANNI ALVAREZ M.D., ATTENDING CARDIOLOGIST  This document has been electronically signed. Dec 25 2019 11:45AM        < from: Cardiac Cath Lab - Adult (19 @ 08:55) >  [facility]  Department of Cardiology  [address] [place] [address]  [phone]  Cath Lab Report -- Comprehensive Report  Patient: SARAH MORRISON  Study date: 2019  Account number: [id number]  MR number: [id number]  : 1970  Gender: Male  Race: W  Case Physician(s):  Laurie Tsai M.D.  Fellow:  Bert Persaud M.D.  Referring Physician:  INDICATIONS: Initial NSTEMI.  HISTORY: There was no prior cardiac history. The patient has hypertension.  PRIOR CARDIOVASCULAR PROCEDURES: None.  PROCEDURE:  --  Intervention on mid RCA: balloon angioplasty, stent, balloon  angioplasty.  TECHNIQUE: The risks and alternatives of the procedures and conscious  sedation were explained to the patient and informed consent was obtained.  Coronary intervention performed electively.  Local anesthetic given. Right radial artery access. A 6FR PRELUDE KIT was  inserted in the vessel. RADIATION EXPOSURE: 5 min. A successful balloon  angioplasty with stent and balloon angioplasty was performed on the 95 %  lesion in the mid RCA. Following intervention there was an excellent  angiographic appearance with a 1 % residual stenosis. This was not a  bifurcation lesion. There was no evidence of the transient no-reflow  phenomenon. There was WILLY 3 flow before the procedure and WILLY 3 flow  after the procedure. Vessel setup was performed. A 6FR JR4 LAUNCHER  guiding catheter was used to intubate the vessel. Vessel setup was  performed. A DIMITRI [id number] wire was used to cross the lesion. Balloon  angioplasty was performed, using a 3.0 X 15 EUPHORA balloon, with 1  inflations and a maximum inflation pressure of 16 kady. A 3.50 X 24MM COBRA  bare-metal stent was placed across the lesion and deployed at a maximum  inflation pressure of 14 kady. Balloon angioplasty was performed, using a  4.00 X 12 NC APEX balloon, with 2 inflations and a maximum inflation  pressure of 18 kady.  CONTRAST GIVEN: Omnipaque 47 ml.  MEDICATIONS GIVEN: Fentanyl, 25 mcg, IV. Midazolam, 1 mg, IV. Verapamil  (Isoptin, Calan, Covera), 2.5 mg, IA. Nitroglycerin, 200 mcg,  intracoronary. Heparin, 3000 units, IA. Heparin, 7000 units, IV.  VENTRICLES: No left ventriculogram was performed.  CORONARY VESSELS: The coronary circulation is right dominant.  RCA:   --  Proximal RCA: There was a 95 % stenosis.  COMPLICATIONS: There were no complications.  INTERVENTIONAL IMPRESSIONS: Successful stenting of the RCA with BMS  INTERVENTIONAL RECOMMENDATIONS: Aspirin for 1 week. Plavix for 3 weeks.  Eliquis for PE (duration TBD) - limit triple therapy for 1 week then  continue with plavix eliquis for 3 weeks. This modified DAPT regimen is  due to his recent perforated ulcer and surgery.  Prepared and signed by  Laurie Tsai M.D.  Signed 2019 13:10:21  HEMODYNAMIC TABLES  Outputs:  Baseline  Outputs:  -- CALCULATIONS[age]  Outputs:  -- CALCULATIONS: Body Surface Area: 2.15  Outputs:  -- CALCULATIONS: Height in cm: 188.00  Outputs:  -- CALCULATIONS: Sex: Male  Outputs:  -- CALCULATIONS: Weight in k.50    < end of copied text >    < end of copied text >    PTT - ( 18 May 2020 21:21 )  PTT:30.4 sec      Blood Culture:         RADIOLOGY:    EKG:    Assessment/Plan:  49y Male    Radha Wood [facility]  #[id number]  [phone] [facility] - Bolivar Carbajal, Brittany Gallegos, Zahra, Reynaldo, Román Boss  Office Number: [phone]        CHIEF COMPLAINT: Patient is a 49y old  Male who presents with a chief complaint of Left Foot Infection (19 May 2020 13:11)      HPI:    48 yo male with PMHx significant of CAD s/p BMS to RCA (2019), RUL subsegmental PE (2019, on Eliquis), hx of NSTEMI and PAF s/p ex-lap omentopexy and plication for perforated antral ulcer (2019), osteomyelitis s/p debridement, patient had X ray soft tissue air and bony erosion around the distal fifth metatarsal highly concerning for osteomyelitis. Plan is for left toe amputation CVA s/p tPA (2019), HTN and DM2 (not on insulin)      PAST MEDICAL & SURGICAL HISTORY:  Cerebrovascular accident  CAD S/P percutaneous coronary angioplasty  Osteomyelitis: s/p debridement  History of non-ST elevation myocardial infarction (NSTEMI)  Pulmonary embolism  Perforated gastric ulcer: s/p emergent ex-lap omentopexy and plication 2019  BPH (benign prostatic hyperplasia)  Hypertension  Afib  Diabetes mellitus with no complication  Diabetes  Traumatic amputation of left foot, initial encounter  Perforated gastric ulcer  H/O abdominal surgery    SOCIAL HISTORY:  No tobacco, ethanol, or drug abuse.  FAMILY HISTORY:  FH: stroke: Father  FH: coronary artery disease: Father  FH: pulmonary embolism: Mother    No family history of acute MI or sudden cardiac death.  MEDICATIONS  (STANDING):  apixaban 5 milliGRAM(s) Oral every 12 hours  bethanechol 25 milliGRAM(s) Oral three times a day  clopidogrel Tablet 75 milliGRAM(s) Oral daily  dextrose 5%. 1000 milliLiter(s) (50 mL/Hr) IV Continuous <Continuous>  dextrose 50% Injectable 12.5 Gram(s) IV Push once  dextrose 50% Injectable 25 Gram(s) IV Push once  dextrose 50% Injectable 25 Gram(s) IV Push once  insulin glargine Injectable (LANTUS) 6 Unit(s) SubCutaneous at bedtime  insulin lispro (HumaLOG) corrective regimen sliding scale   SubCutaneous three times a day before meals  insulin lispro (HumaLOG) corrective regimen sliding scale   SubCutaneous at bedtime  isosorbide   mononitrate ER Tablet (IMDUR) 30 milliGRAM(s) Oral daily  losartan 25 milliGRAM(s) Oral daily  methimazole 10 milliGRAM(s) Oral daily  metoprolol tartrate 25 milliGRAM(s) Oral two times a day  pantoprazole    Tablet 40 milliGRAM(s) Oral before breakfast  piperacillin/tazobactam IVPB.. 3.375 Gram(s) IV Intermittent every 8 hours    MEDICATIONS  (PRN):  dextrose 40% Gel 15 Gram(s) Oral once PRN Blood Glucose LESS THAN 70 milliGRAM(s)/deciliter  glucagon  Injectable 1 milliGRAM(s) IntraMuscular once PRN Glucose LESS THAN 70 milligrams/deciliter    Allergies    fish (Hives)  No Known Drug Allergies    Intolerances      REVIEW OF SYSTEMS:  CONSTITUTIONAL: No weakness, fevers or chills  EYES/ENT: No visual changes;  No vertigo or throat pain   NECK: No pain or stiffness  RESPIRATORY: No cough, wheezing, hemoptysis; No shortness of breath  CARDIOVASCULAR: No chest pain or palpitations  GASTROINTESTINAL: No abdominal pain. No nausea, vomiting, or hematemesis; No diarrhea or constipation. No melena or hematochezia.  GENITOURINARY: No dysuria, frequency or hematuria  NEUROLOGICAL: No numbness or weakness  SKIN: No itching or rash  All other review of systems is negative unless indicated above  VITAL SIGNS:   Vital Signs Last 24 Hrs  T(C): 36.6 (19 May 2020 13:48), Max: 37.2 (19 May 2020 03:46)  T(F): 97.8 (19 May 2020 13:48), Max: 98.9 (19 May 2020 03:46)  HR: 72 (19 May 2020 13:48) (72 - 92)  BP: 125/76 (19 May 2020 13:48) (125/76 - 155/74)  BP(mean): --  RR: 16 (19 May 2020 13:48) (16 - 23)  SpO2: 92% (19 May 2020 13:48) (92% - 97%)  I&O's Summary    18 May 2020 07:01  -  19 May 2020 07:00  --------------------------------------------------------  IN: 0 mL / OUT: 700 mL / NET: -700 mL      On Exam:  Constitutional: NAD, alert and oriented x 3  Lungs:  Non-labored, bilateral wheeze  Cardiovascular: RRR.  S1 and S2 positive.  No murmurs, rubs, gallops or clicks  Gastrointestinal: Bowel Sounds present, soft, nontender.   Lymph: No peripheral edema. No cervical lymphadenopathy.  Neurological: Alert, no focal deficits  EXTREMITIES: bilateral lower extremity edema, erythema right and left feet  Skin: No rashes or ulcers   Psych:  Mood & affect appropriate.    LABS: All Labs Reviewed:                        11.0   9.11  )-----------( 326      ( 19 May 2020 07:08 )             35.1                         10.8   9.38  )-----------( 303      ( 18 May 2020 21:21 )             34.4     19 May 2020 07:08    138    |  104    |  8      ----------------------------<  319    3.9     |  26     |  0.92   18 May 2020 21:21    135    |  104    |  10     ----------------------------<  345    3.8     |  25     |  0.95     Ca    8.7        19 May 2020 07:08  Ca    8.3        18 May 2020 21:21  Mg     1.9       18 May 2020 21:21    TPro  7.0    /  Alb  2.2    /  TBili  0.2    /  DBili  x      /  AST  19     /  ALT  25     /  AlkPhos  114    18 May 2020 21:21    PT/INR - ( 18 May 2020 21:21 )   PT: 12.4 sec;   INR: 1.10 ratio       < from: TTE Echo Doppler w/o Cont (19 @ 13:55) >  ROCEDURE DATE:  2019        INTERPRETATION:  INDICATION: Coronary artery disease    Blood Pressure 117/71    Height 187     Weight 93       BSA 2.2    Dimensions:    LA 4.2       Normal Values: 2.0 - 4.0 cm    Ao 4.0        Normal Values: 2.0 - 3.8 cm  SEPTUM 1.6       Normal Values: 0.6 - 1.2 cm  PWT 1.6       Normal Values: 0.6 - 1.1 cm  LVIDd 5.8         Normal Values: 3.0 - 5.6 cm  LVIDs 3.2         Normal Values: 1.8 - 4.0 cm    Derived Variables:  LVMI g/m2  RWT  Fractional Short  Ejection Fraction    Doppler Peak v. AoV= (m/sec)    OBSERVATIONS:    Mitral Valve: normal, mild MR.  Aortic Valve/Aorta: normal trileaflet aortic valve.  Tricuspid Valve: normal with trace TR.  Pulmonic Valve: normal  Left Atrium: Enlarged  Right Atrium: normal  Left Ventricle: Severe concentric LVH with normal systolic function, estimated LVEF of 65%.  Right Ventricle: normal size and systolic function.  Pericardium/Pleura: no significant pleural effusion, no significant pericardial effusion.  Pulmonary/RV Pressure: Inadequate TR jet to estimate PA systolic pressure  LV Diastolic Function: Grade 2diastolic dysfunction.    Severe concentric LVH, and mild LV enlargement, with normal systolic function, estimated LVEF of 65%. Normal right ventricular size and systolic function. Grade 2diastolic dysfunction. Left atrial enlargement The aortic root is normal in size. The mitral valve is structurally normal, mild MR. The aortic valve is trileaflet without stenosis or insufficiency. Trace physiologic TR. Inadequate TR jet to estimate PA systolic pressure No significant pericardial effusion.                  JOVANNI ALVAREZ M.D., ATTENDING CARDIOLOGIST  This document has been electronically signed. Dec 25 2019 11:45AM        < from: Cardiac Cath Lab - Adult (19 @ 08:55) >  [facility]  Department of Cardiology  [address] [place] [address]  [phone]  Cath Lab Report -- Comprehensive Report  Patient: SARAH MORRISON  Study date: 2019  Account number: [id number]  MR number: [id number]  : 1970  Gender: Male  Race: W  Case Physician(s):  Laurie Tsai M.D.  Fellow:  Bert Persaud M.D.  Referring Physician:  INDICATIONS: Initial NSTEMI.  HISTORY: There was no prior cardiac history. The patient has hypertension.  PRIOR CARDIOVASCULAR PROCEDURES: None.  PROCEDURE:  --  Intervention on mid RCA: balloon angioplasty, stent, balloon  angioplasty.  TECHNIQUE: The risks and alternatives of the procedures and conscious  sedation were explained to the patient and informed consent was obtained.  Coronary intervention performed electively.  Local anesthetic given. Right radial artery access. A 6FR PRELUDE KIT was  inserted in the vessel. RADIATION EXPOSURE: 5 min. A successful balloon  angioplasty with stent and balloon angioplasty was performed on the 95 %  lesion in the mid RCA. Following intervention there was an excellent  angiographic appearance with a 1 % residual stenosis. This was not a  bifurcation lesion. There was no evidence of the transient no-reflow  phenomenon. There was WILLY 3 flow before the procedure and WILLY 3 flow  after the procedure. Vessel setup was performed. A 6FR JR4 LAUNCHER  guiding catheter was used to intubate the vessel. Vessel setup was  performed. A DIMITRI [id number] wire was used to cross the lesion. Balloon  angioplasty was performed, using a 3.0 X 15 EUPHORA balloon, with 1  inflations and a maximum inflation pressure of 16 kady. A 3.50 X 24MM COBRA  bare-metal stent was placed across the lesion and deployed at a maximum  inflation pressure of 14 kady. Balloon angioplasty was performed, using a  4.00 X 12 NC APEX balloon, with 2 inflations and a maximum inflation  pressure of 18 kady.  CONTRAST GIVEN: Omnipaque 47 ml.  MEDICATIONS GIVEN: Fentanyl, 25 mcg, IV. Midazolam, 1 mg, IV. Verapamil  (Isoptin, Calan, Covera), 2.5 mg, IA. Nitroglycerin, 200 mcg,  intracoronary. Heparin, 3000 units, IA. Heparin, 7000 units, IV.  VENTRICLES: No left ventriculogram was performed.  CORONARY VESSELS: The coronary circulation is right dominant.  RCA:   --  Proximal RCA: There was a 95 % stenosis.  COMPLICATIONS: There were no complications.  INTERVENTIONAL IMPRESSIONS: Successful stenting of the RCA with BMS  INTERVENTIONAL RECOMMENDATIONS: Aspirin for 1 week. Plavix for 3 weeks.  Eliquis for PE (duration TBD) - limit triple therapy for 1 week then  continue with plavix eliquis for 3 weeks. This modified DAPT regimen is  due to his recent perforated ulcer and surgery.  Prepared and signed by  Laurie Tsai M.D.  Signed 2019 13:10:21  HEMODYNAMIC TABLES  Outputs:  Baseline  Outputs:  -- CALCULATIONS[age]  Outputs:  -- CALCULATIONS: Body Surface Area: 2.15  Outputs:  -- CALCULATIONS: Height in cm: 188.00  Outputs:  -- CALCULATIONS: Sex: Male  Outputs:  -- CALCULATIONS: Weight in k.50    < end of copied text >    < end of copied text >    PTT - ( 18 May 2020 21:21 )  PTT:30.4 sec      Blood Culture:         RADIOLOGY:    EKG:     sr

## 2020-05-19 NOTE — CONSULT NOTE ADULT - PROBLEM SELECTOR RECOMMENDATION 9
Given the patient's severity of symptoms, will plan for surgical intervention at this time. Discussed risks, benefits, and potential complications with patient regarding surgical intervention including sepsis, loss of limb, and loss of life. All questions answered to satisfaction at this time.  Requesting cardiology risk stratification and optimization.  Requesting medical risk stratification and optimization.  Will plan for left foot transmetatarsal amputation with achilles tenotomy on 5/20/20 at 7:30am. Patient examined and evaluated at this time.  Vascular surgery onboard with Dr. Gruber.  Given the patient's severity of symptoms, will plan for surgical intervention at this time. Discussed risks, benefits, and potential complications with patient regarding surgical intervention including sepsis, loss of limb, and loss of life. All questions answered to satisfaction at this time.  Requesting cardiology risk stratification and optimization.  Requesting medical risk stratification and optimization.  Will plan for left foot transmetatarsal amputation with achilles tenotomy on 5/20/20 at 7:30am.

## 2020-05-19 NOTE — CONSULT NOTE ADULT - PROBLEM SELECTOR RECOMMENDATION 9
very compelling and would doubt a good outcome without surgical intervention, assessment of vascular supply with know CAD-resonable to continue current abx pending further data with Vanco goal troughs 15-20 per pharmacy protocol    Thank you for consulting us and involving us in the management of this most interesting and challenging case.     We will follow along in the care of this patient.

## 2020-05-19 NOTE — CONSULT NOTE ADULT - ATTENDING COMMENTS
paf  revasc cad, bare metal stent placed 8/2019  hx pe 6/2019  needs toe amp  optimized from a cv perspective for low risk procedure  can hold ac/plavix if needed for procedure

## 2020-05-19 NOTE — ADVANCED PRACTICE NURSE CONSULT - ASSESSMENT
Vital Signs Last 24 Hrs  T(C): 36.6 (19 May 2020 13:48), Max: 37.2 (19 May 2020 03:46)  T(F): 97.8 (19 May 2020 13:48), Max: 98.9 (19 May 2020 03:46)  HR: 71 (19 May 2020 16:47) (71 - 92)  BP: 128/68 (19 May 2020 16:47) (125/76 - 155/74)  BP(mean): --  RR: 16 (19 May 2020 13:48) (16 - 23)  SpO2: 92% (19 May 2020 13:48) (92% - 97%)    Hemoglobin A1C, Whole Blood: 8.4 % (12-24-19 @ 07:53)   eGFR if Non African American: 96 mL/min/1.73M2 (05-19-20 @ 07:08)  eGFR if : 111 mL/min/1.73M2 (05-19-20 @ 07:08)  eGFR if Non African American: 92 mL/min/1.73M2 (05-18-20 @ 21:21)  eGFR if : 107 mL/min/1.73M2 (05-18-20 @ 21:21)      CAPILLARY BLOOD GLUCOSE      POCT Blood Glucose.: 208 mg/dL (19 May 2020 16:42)  POCT Blood Glucose.: 350 mg/dL (19 May 2020 11:25)  POCT Blood Glucose.: 310 mg/dL (19 May 2020 07:37)      DIET: CC  %

## 2020-05-19 NOTE — H&P ADULT - ASSESSMENT
soft tissue air and bony erosion around the distal fifth metatarsal highly concerning for Osteomyelitis

## 2020-05-19 NOTE — H&P ADULT - HISTORY OF PRESENT ILLNESS
49 male presents to ER c/o left foot pain, states he has neuropathy, may have stepped onto something that caused an abrasion, developed into swelling, redness and pain, worse with palpation and walking, patient was reluctant to come into the hospital due to the corona virus, 49 male presents to ER c/o left foot pain, states he has neuropathy, may have stepped onto something that caused an abrasion, developed into swelling, redness and pain, worse with palpation and walking, patient was reluctant to come into the hospital due to the corona virus,  In the ed patient had X ray 49 male presents to ER c/o left foot pain, states he has neuropathy, may have stepped onto something that caused an abrasion, developed into swelling, redness and pain, worse with palpation and walking, patient was reluctant to come into the hospital due to the corona virus,  In the ed patient had X ray soft tissue air and bony erosion around the distal fifth metatarsal highly concerning for osteomyelitis. 51 male 50 yo M with hx CVA in past, is on Eliquis (unclear what time last took), also h/o AF, HTN, hyperthyroid, DM, BPH presents to ER c/o left foot pain, states he has neuropathy, may have stepped onto something that caused an abrasion, developed into swelling, redness and pain, worse with palpation and walking, patient was reluctant to come into the hospital due to the corona virus,    In the ed patient had X ray soft tissue air and bony erosion around the distal fifth metatarsal highly concerning for osteomyelitis.   Patient received Vancomycin and Zosyn in the ed.

## 2020-05-19 NOTE — CONSULT NOTE ADULT - CONSULT REASON
cardiac clearance for amputation for left foot transmetatarsal amputation with achilles tenotomy on 5/20/20 at 7:30am.

## 2020-05-20 ENCOUNTER — RESULT REVIEW (OUTPATIENT)
Age: 50
End: 2020-05-20

## 2020-05-20 LAB
GRAM STN FLD: SIGNIFICANT CHANGE UP
GRAM STN FLD: SIGNIFICANT CHANGE UP
SPECIMEN SOURCE: SIGNIFICANT CHANGE UP
SPECIMEN SOURCE: SIGNIFICANT CHANGE UP

## 2020-05-20 PROCEDURE — 27606 INCISION OF ACHILLES TENDON: CPT | Mod: LT

## 2020-05-20 PROCEDURE — 88311 DECALCIFY TISSUE: CPT | Mod: 26

## 2020-05-20 PROCEDURE — 99232 SBSQ HOSP IP/OBS MODERATE 35: CPT

## 2020-05-20 PROCEDURE — 73630 X-RAY EXAM OF FOOT: CPT | Mod: 26,LT

## 2020-05-20 PROCEDURE — 93010 ELECTROCARDIOGRAM REPORT: CPT

## 2020-05-20 PROCEDURE — 88304 TISSUE EXAM BY PATHOLOGIST: CPT | Mod: 26

## 2020-05-20 PROCEDURE — 28805 AMPUTATION THRU METATARSAL: CPT | Mod: LT

## 2020-05-20 RX ORDER — OXYCODONE HYDROCHLORIDE 5 MG/1
5 TABLET ORAL EVERY 6 HOURS
Refills: 0 | Status: DISCONTINUED | OUTPATIENT
Start: 2020-05-20 | End: 2020-05-23

## 2020-05-20 RX ORDER — SODIUM CHLORIDE 9 MG/ML
1000 INJECTION, SOLUTION INTRAVENOUS
Refills: 0 | Status: DISCONTINUED | OUTPATIENT
Start: 2020-05-20 | End: 2020-05-23

## 2020-05-20 RX ORDER — BETHANECHOL CHLORIDE 25 MG
25 TABLET ORAL THREE TIMES A DAY
Refills: 0 | Status: DISCONTINUED | OUTPATIENT
Start: 2020-05-20 | End: 2020-05-23

## 2020-05-20 RX ORDER — DEXTROSE 50 % IN WATER 50 %
25 SYRINGE (ML) INTRAVENOUS ONCE
Refills: 0 | Status: DISCONTINUED | OUTPATIENT
Start: 2020-05-20 | End: 2020-05-23

## 2020-05-20 RX ORDER — INSULIN LISPRO 100/ML
3 VIAL (ML) SUBCUTANEOUS ONCE
Refills: 0 | Status: COMPLETED | OUTPATIENT
Start: 2020-05-20 | End: 2020-05-20

## 2020-05-20 RX ORDER — ONDANSETRON 8 MG/1
4 TABLET, FILM COATED ORAL ONCE
Refills: 0 | Status: DISCONTINUED | OUTPATIENT
Start: 2020-05-20 | End: 2020-05-20

## 2020-05-20 RX ORDER — ACETAMINOPHEN 500 MG
650 TABLET ORAL EVERY 6 HOURS
Refills: 0 | Status: DISCONTINUED | OUTPATIENT
Start: 2020-05-20 | End: 2020-05-23

## 2020-05-20 RX ORDER — HYDROMORPHONE HYDROCHLORIDE 2 MG/ML
0.25 INJECTION INTRAMUSCULAR; INTRAVENOUS; SUBCUTANEOUS
Refills: 0 | Status: DISCONTINUED | OUTPATIENT
Start: 2020-05-20 | End: 2020-05-20

## 2020-05-20 RX ORDER — TRAMADOL HYDROCHLORIDE 50 MG/1
25 TABLET ORAL EVERY 6 HOURS
Refills: 0 | Status: DISCONTINUED | OUTPATIENT
Start: 2020-05-20 | End: 2020-05-23

## 2020-05-20 RX ORDER — DEXTROSE 50 % IN WATER 50 %
15 SYRINGE (ML) INTRAVENOUS ONCE
Refills: 0 | Status: DISCONTINUED | OUTPATIENT
Start: 2020-05-20 | End: 2020-05-23

## 2020-05-20 RX ORDER — PANTOPRAZOLE SODIUM 20 MG/1
40 TABLET, DELAYED RELEASE ORAL
Refills: 0 | Status: DISCONTINUED | OUTPATIENT
Start: 2020-05-20 | End: 2020-05-23

## 2020-05-20 RX ORDER — INSULIN GLARGINE 100 [IU]/ML
15 INJECTION, SOLUTION SUBCUTANEOUS AT BEDTIME
Refills: 0 | Status: DISCONTINUED | OUTPATIENT
Start: 2020-05-20 | End: 2020-05-23

## 2020-05-20 RX ORDER — CLOPIDOGREL BISULFATE 75 MG/1
75 TABLET, FILM COATED ORAL DAILY
Refills: 0 | Status: DISCONTINUED | OUTPATIENT
Start: 2020-05-20 | End: 2020-05-23

## 2020-05-20 RX ORDER — OXYCODONE HYDROCHLORIDE 5 MG/1
5 TABLET ORAL ONCE
Refills: 0 | Status: DISCONTINUED | OUTPATIENT
Start: 2020-05-20 | End: 2020-05-20

## 2020-05-20 RX ORDER — ISOSORBIDE MONONITRATE 60 MG/1
30 TABLET, EXTENDED RELEASE ORAL DAILY
Refills: 0 | Status: DISCONTINUED | OUTPATIENT
Start: 2020-05-20 | End: 2020-05-23

## 2020-05-20 RX ORDER — PIPERACILLIN AND TAZOBACTAM 4; .5 G/20ML; G/20ML
3.38 INJECTION, POWDER, LYOPHILIZED, FOR SOLUTION INTRAVENOUS EVERY 8 HOURS
Refills: 0 | Status: DISCONTINUED | OUTPATIENT
Start: 2020-05-20 | End: 2020-05-22

## 2020-05-20 RX ORDER — DEXTROSE 50 % IN WATER 50 %
12.5 SYRINGE (ML) INTRAVENOUS ONCE
Refills: 0 | Status: DISCONTINUED | OUTPATIENT
Start: 2020-05-20 | End: 2020-05-23

## 2020-05-20 RX ORDER — ATORVASTATIN CALCIUM 80 MG/1
80 TABLET, FILM COATED ORAL AT BEDTIME
Refills: 0 | Status: DISCONTINUED | OUTPATIENT
Start: 2020-05-20 | End: 2020-05-23

## 2020-05-20 RX ORDER — SODIUM CHLORIDE 9 MG/ML
1000 INJECTION, SOLUTION INTRAVENOUS
Refills: 0 | Status: DISCONTINUED | OUTPATIENT
Start: 2020-05-20 | End: 2020-05-20

## 2020-05-20 RX ORDER — LOSARTAN POTASSIUM 100 MG/1
25 TABLET, FILM COATED ORAL DAILY
Refills: 0 | Status: DISCONTINUED | OUTPATIENT
Start: 2020-05-20 | End: 2020-05-23

## 2020-05-20 RX ORDER — MORPHINE SULFATE 50 MG/1
2 CAPSULE, EXTENDED RELEASE ORAL EVERY 4 HOURS
Refills: 0 | Status: DISCONTINUED | OUTPATIENT
Start: 2020-05-20 | End: 2020-05-23

## 2020-05-20 RX ORDER — INSULIN LISPRO 100/ML
VIAL (ML) SUBCUTANEOUS
Refills: 0 | Status: DISCONTINUED | OUTPATIENT
Start: 2020-05-20 | End: 2020-05-23

## 2020-05-20 RX ORDER — INSULIN LISPRO 100/ML
VIAL (ML) SUBCUTANEOUS AT BEDTIME
Refills: 0 | Status: DISCONTINUED | OUTPATIENT
Start: 2020-05-20 | End: 2020-05-23

## 2020-05-20 RX ORDER — ACETAMINOPHEN 500 MG
1000 TABLET ORAL ONCE
Refills: 0 | Status: COMPLETED | OUTPATIENT
Start: 2020-05-20 | End: 2020-05-20

## 2020-05-20 RX ORDER — METOPROLOL TARTRATE 50 MG
25 TABLET ORAL
Refills: 0 | Status: DISCONTINUED | OUTPATIENT
Start: 2020-05-20 | End: 2020-05-23

## 2020-05-20 RX ORDER — VANCOMYCIN HCL 1 G
1000 VIAL (EA) INTRAVENOUS EVERY 12 HOURS
Refills: 0 | Status: DISCONTINUED | OUTPATIENT
Start: 2020-05-20 | End: 2020-05-21

## 2020-05-20 RX ORDER — GLUCAGON INJECTION, SOLUTION 0.5 MG/.1ML
1 INJECTION, SOLUTION SUBCUTANEOUS ONCE
Refills: 0 | Status: DISCONTINUED | OUTPATIENT
Start: 2020-05-20 | End: 2020-05-23

## 2020-05-20 RX ADMIN — PIPERACILLIN AND TAZOBACTAM 25 GRAM(S): 4; .5 INJECTION, POWDER, LYOPHILIZED, FOR SOLUTION INTRAVENOUS at 06:12

## 2020-05-20 RX ADMIN — Medication 25 MILLIGRAM(S): at 05:39

## 2020-05-20 RX ADMIN — Medication 3 UNIT(S): at 10:23

## 2020-05-20 RX ADMIN — INSULIN GLARGINE 15 UNIT(S): 100 INJECTION, SOLUTION SUBCUTANEOUS at 23:40

## 2020-05-20 RX ADMIN — Medication 25 MILLIGRAM(S): at 23:40

## 2020-05-20 RX ADMIN — CLOPIDOGREL BISULFATE 75 MILLIGRAM(S): 75 TABLET, FILM COATED ORAL at 12:11

## 2020-05-20 RX ADMIN — ISOSORBIDE MONONITRATE 30 MILLIGRAM(S): 60 TABLET, EXTENDED RELEASE ORAL at 12:10

## 2020-05-20 RX ADMIN — Medication 25 MILLIGRAM(S): at 13:52

## 2020-05-20 RX ADMIN — MORPHINE SULFATE 2 MILLIGRAM(S): 50 CAPSULE, EXTENDED RELEASE ORAL at 23:41

## 2020-05-20 RX ADMIN — Medication 25 MILLIGRAM(S): at 17:36

## 2020-05-20 RX ADMIN — Medication 400 MILLIGRAM(S): at 10:27

## 2020-05-20 RX ADMIN — Medication 2: at 17:23

## 2020-05-20 RX ADMIN — OXYCODONE HYDROCHLORIDE 5 MILLIGRAM(S): 5 TABLET ORAL at 18:27

## 2020-05-20 RX ADMIN — Medication 6: at 12:09

## 2020-05-20 RX ADMIN — ATORVASTATIN CALCIUM 80 MILLIGRAM(S): 80 TABLET, FILM COATED ORAL at 23:40

## 2020-05-20 RX ADMIN — Medication 650 MILLIGRAM(S): at 23:41

## 2020-05-20 RX ADMIN — Medication 250 MILLIGRAM(S): at 17:23

## 2020-05-20 RX ADMIN — SODIUM CHLORIDE 75 MILLILITER(S): 9 INJECTION, SOLUTION INTRAVENOUS at 09:52

## 2020-05-20 NOTE — BRIEF OPERATIVE NOTE - NSICDXBRIEFPROCEDURE_GEN_ALL_CORE_FT
PROCEDURES:  Percutaneous tenotomy of Achilles tendon with general anesthesia 20-May-2020 10:08:08  Parviz Todd  Transmetatarsal amputation 20-May-2020 10:07:55  Parviz Todd

## 2020-05-20 NOTE — PROGRESS NOTE ADULT - SUBJECTIVE AND OBJECTIVE BOX
Patient is a 49y old  Male who presents with a chief complaint of toe (20 May 2020 13:20)      INTERVAL /OVERNIGHT EVENTS: denies    MEDICATIONS  (STANDING):  atorvastatin 80 milliGRAM(s) Oral at bedtime  bethanechol 25 milliGRAM(s) Oral three times a day  clopidogrel Tablet 75 milliGRAM(s) Oral daily  dextrose 5%. 1000 milliLiter(s) (50 mL/Hr) IV Continuous <Continuous>  dextrose 50% Injectable 12.5 Gram(s) IV Push once  dextrose 50% Injectable 25 Gram(s) IV Push once  dextrose 50% Injectable 25 Gram(s) IV Push once  insulin glargine Injectable (LANTUS) 15 Unit(s) SubCutaneous at bedtime  insulin lispro (HumaLOG) corrective regimen sliding scale   SubCutaneous three times a day before meals  insulin lispro (HumaLOG) corrective regimen sliding scale   SubCutaneous at bedtime  isosorbide   mononitrate ER Tablet (IMDUR) 30 milliGRAM(s) Oral daily  losartan 25 milliGRAM(s) Oral daily  methimazole 10 milliGRAM(s) Oral daily  metoprolol tartrate 25 milliGRAM(s) Oral two times a day  pantoprazole    Tablet 40 milliGRAM(s) Oral before breakfast  piperacillin/tazobactam IVPB.. 3.375 Gram(s) IV Intermittent every 8 hours  vancomycin  IVPB 1000 milliGRAM(s) IV Intermittent every 12 hours    MEDICATIONS  (PRN):  acetaminophen   Tablet .. 650 milliGRAM(s) Oral every 6 hours PRN Mild Pain (1 - 3)  dextrose 40% Gel 15 Gram(s) Oral once PRN Blood Glucose LESS THAN 70 milliGRAM(s)/deciliter  glucagon  Injectable 1 milliGRAM(s) IntraMuscular once PRN Glucose LESS THAN 70 milligrams/deciliter  oxyCODONE    IR 5 milliGRAM(s) Oral every 6 hours PRN Severe Pain (7 - 10)  traMADol 25 milliGRAM(s) Oral every 6 hours PRN Moderate Pain (4 - 6)      Allergies    fish (Hives)  No Known Drug Allergies    Intolerances        REVIEW OF SYSTEMS: denies      Vital Signs Last 24 Hrs  T(C): 36.6 (20 May 2020 14:27), Max: 37.2 (20 May 2020 10:40)  T(F): 97.8 (20 May 2020 14:27), Max: 99 (20 May 2020 10:40)  HR: 66 (20 May 2020 14:27) (61 - 69)  BP: 110/63 (20 May 2020 14:27) (106/61 - 143/73)  BP(mean): --  RR: 17 (20 May 2020 14:27) (11 - 18)  SpO2: 93% (20 May 2020 14:27) (90% - 96%)    PHYSICAL EXAM:  GENERAL: NAD, well-groomed, well-developed  HEAD:  Atraumatic, Normocephalic  EYES: EOMI, PERRLA, conjunctiva and sclera clear  ENMT: No tonsillar erythema, exudates, or enlargement; Moist mucous membranes, Good dentition, No lesions  NECK: Supple, No JVD, Normal thyroid  NERVOUS SYSTEM:  Alert & Oriented X3, Good concentration; Motor Strength 5/5 B/L upper and lower extremities; DTRs 2+ intact and symmetric  CHEST/LUNG: Clear to auscultation bilaterally; No rales, rhonchi, wheezing, or rubs  HEART: Regular rate and rhythm; No murmurs, rubs, or gallops  ABDOMEN: Soft, Nontender, Nondistended; Bowel sounds present  EXTREMITIES:  LLE in bandage  LYMPH: No lymphadenopathy noted  SKIN: No rashes or lesions    LABS:      Ca    8.7        19 May 2020 07:08      PT/INR - ( 18 May 2020 21:21 )   PT: 12.4 sec;   INR: 1.10 ratio         PTT - ( 18 May 2020 21:21 )  PTT:30.4 sec  Urinalysis Basic - ( 18 May 2020 21:56 )    Color: Yellow / Appearance: Slightly Turbid / S.010 / pH: x  Gluc: x / Ketone: Trace  / Bili: Negative / Urobili: Negative   Blood: x / Protein: 15 / Nitrite: Negative   Leuk Esterase: Moderate / RBC: 6-10 /HPF / WBC 26-50   Sq Epi: x / Non Sq Epi: Occasional / Bacteria: Few      CAPILLARY BLOOD GLUCOSE      POCT Blood Glucose.: 169 mg/dL (20 May 2020 16:50)  POCT Blood Glucose.: 277 mg/dL (20 May 2020 11:55)  POCT Blood Glucose.: 276 mg/dL (20 May 2020 09:32)  POCT Blood Glucose.: 253 mg/dL (20 May 2020 06:30)  POCT Blood Glucose.: 239 mg/dL (19 May 2020 20:32)      RADIOLOGY & ADDITIONAL TESTS:    Notes Reviewed:  [x ] YES  [ ] NO    Care Discussed with Consultants/Other Providers [x ] YES  [ ] NO

## 2020-05-20 NOTE — DIETITIAN INITIAL EVALUATION ADULT. - OTHER INFO
Pt is a 50 y/o M with PMH CVA, HTN, T2DM (on home insulin/Metformin; HgbA1c 11.6%). Admitted with c/o L foot pain. Attempted to contact pt in room, unavailable at this time. Information obtained from chart review/staff as able.     Pt s/p transmetatarsal amputation today (5/20). Currently receiving Consistent CHO diet (no snack). Per chart review, pt with hx of T2DM and seen by RD services on multiple occasions. Per most recent RD visit (12/2019), pt received oral/written education re: Consistent CHO diet (HgbA1c 8.4% at that time; currently increased, implicating poorly controlled DM). Pt weighed 205 lb. Current weight 231.4 lb indicating weight gain. Fish allergy per EMR. No hx chewing/swallowing difficulty. Pt with good intake in past and continues to tolerate diet with good consumption (100% per flowsheets). No BM since admission.

## 2020-05-20 NOTE — PROGRESS NOTE ADULT - SUBJECTIVE AND OBJECTIVE BOX
[facility] -- Bolivar Carbajal, El, Brittany, Reynaldo Sweeney Savella  Office # [phone]      Follow Up:  post op     Subjective/Observations: No events overnight resting comfortably in bed.  No complaints of chest pain, dyspnea, or palpitations reported. No signs of orthopnea or PND.  REVIEW OF SYSTEMS: All other review of systems is negative unless indicated above    PAST MEDICAL & SURGICAL HISTORY:  Cerebrovascular accident  CAD S/P percutaneous coronary angioplasty  Osteomyelitis: s/p debridement  History of non-ST elevation myocardial infarction (NSTEMI)  Pulmonary embolism  Perforated gastric ulcer: s/p emergent ex-lap omentopexy and plication 6/2019  BPH (benign prostatic hyperplasia)  Hypertension  Afib  Diabetes mellitus with no complication  Diabetes  Traumatic amputation of left foot, initial encounter  Perforated gastric ulcer  H/O abdominal surgery      MEDICATIONS  (STANDING):  atorvastatin 80 milliGRAM(s) Oral at bedtime  bethanechol 25 milliGRAM(s) Oral three times a day  clopidogrel Tablet 75 milliGRAM(s) Oral daily  dextrose 5%. 1000 milliLiter(s) (50 mL/Hr) IV Continuous <Continuous>  dextrose 50% Injectable 12.5 Gram(s) IV Push once  dextrose 50% Injectable 25 Gram(s) IV Push once  dextrose 50% Injectable 25 Gram(s) IV Push once  insulin glargine Injectable (LANTUS) 15 Unit(s) SubCutaneous at bedtime  insulin lispro (HumaLOG) corrective regimen sliding scale   SubCutaneous three times a day before meals  insulin lispro (HumaLOG) corrective regimen sliding scale   SubCutaneous at bedtime  isosorbide   mononitrate ER Tablet (IMDUR) 30 milliGRAM(s) Oral daily  losartan 25 milliGRAM(s) Oral daily  methimazole 10 milliGRAM(s) Oral daily  metoprolol tartrate 25 milliGRAM(s) Oral two times a day  pantoprazole    Tablet 40 milliGRAM(s) Oral before breakfast    MEDICATIONS  (PRN):  dextrose 40% Gel 15 Gram(s) Oral once PRN Blood Glucose LESS THAN 70 milliGRAM(s)/deciliter  glucagon  Injectable 1 milliGRAM(s) IntraMuscular once PRN Glucose LESS THAN 70 milligrams/deciliter      Allergies    fish (Hives)  No Known Drug Allergies    Intolerances        Vital Signs Last 24 Hrs  T(C): 36.7 (20 May 2020 11:42), Max: 37.2 (20 May 2020 10:40)  T(F): 98 (20 May 2020 11:42), Max: 99 (20 May 2020 10:40)  HR: 66 (20 May 2020 11:42) (61 - 72)  BP: 122/66 (20 May 2020 11:42) (106/61 - 143/73)  BP(mean): --  RR: 18 (20 May 2020 11:42) (11 - 18)  SpO2: 93% (20 May 2020 11:42) (90% - 96%)    I&O's Summary    19 May 2020 07:01  -  20 May 2020 07:00  --------------------------------------------------------  IN: 0 mL / OUT: 3600 mL / NET: -3600 mL    20 May 2020 07:01  -  20 May 2020 13:21  --------------------------------------------------------  IN: 236 mL / OUT: 300 mL / NET: -64 mL      Weight (kg): 99.8 (05-20 @ 07:31)    PHYSICAL EXAM:  TELE: not on tele   Constitutional: NAD, awake and alert, well-developed  HEENT: Moist Mucous Membranes, Anicteric  Pulmonary: Non-labored, breath sounds are clear bilaterally, No wheezing, crackles or rhonchi  Cardiovascular: Regular, S1 and S2 nl, No murmurs, rubs, gallops or clicks  Gastrointestinal: Bowel Sounds present, soft, nontender.   Lymph: No lymphadenopathy. No peripheral edema.  Skin: foot with dressing   Psych:  Mood & affect appropriate    LABS: All Labs Reviewed:                        11.0   9.11  )-----------( 326      ( 19 May 2020 07:08 )             35.1                         10.8   9.38  )-----------( 303      ( 18 May 2020 21:21 )             34.4     19 May 2020 07:08    138    |  104    |  8      ----------------------------<  319    3.9     |  26     |  0.92   18 May 2020 21:21    135    |  104    |  10     ----------------------------<  345    3.8     |  25     |  0.95     Ca    8.7        19 May 2020 07:08  Ca    8.3        18 May 2020 21:21  Mg     1.9       18 May 2020 21:21    TPro  7.0    /  Alb  2.2    /  TBili  0.2    /  DBili  x      /  AST  19     /  ALT  25     /  AlkPhos  114    18 May 2020 21:21    PT/INR - ( 18 May 2020 21:21 )   PT: 12.4 sec;   INR: 1.10 ratio         PTT - ( 18 May 2020 21:21 )  PTT:30.4 sec         ECG:  < from: 12 Lead ECG (12.23.19 @ 15:36) >    Ventricular Rate 86 BPM    Atrial Rate 86 BPM    P-R Interval 152 ms    QRS Duration 86 ms    Q-T Interval 370 ms    QTC Calculation(Bezet) 442 ms    P Axis 35 degrees    R Axis 6 degrees    T Axis -16 degrees    Diagnosis Line Normal sinus rhythm  Nonspecific ST abnormality    < end of copied text >      Echo:  < from: TTE Echo Doppler w/o Cont (12.24.19 @ 13:55) >    Mitral Valve: normal, mild MR.  Aortic Valve/Aorta: normal trileaflet aortic valve.  Tricuspid Valve: normal with trace TR.  Pulmonic Valve: normal  Left Atrium: Enlarged  Right Atrium: normal  Left Ventricle: Severe concentric LVH with normal systolic function, estimated LVEF of 65%.  Right Ventricle: normal size and systolic function.  Pericardium/Pleura: no significant pleural effusion, no significant pericardial effusion.  Pulmonary/RV Pressure: Inadequate TR jet to estimate PA systolic pressure  LV Diastolic Function: Grade 2diastolic dysfunction.    Severe concentric LVH, and mild LV enlargement, with normal systolic function, estimated LVEF of 65%. Normal right ventricular size and systolic function. Grade 2diastolic dysfunction. Left atrial enlargement The aortic root is normal in size. The mitral valve is structurally normal, mild MR. The aortic valve is trileaflet without stenosis or insufficiency. Trace physiologic TR. Inadequate TR jet to estimate PA systolic pressure No significant pericardial effusion.        < end of copied text >    Radiology:

## 2020-05-20 NOTE — CONSULT NOTE ADULT - SUBJECTIVE AND OBJECTIVE BOX
Patient is a 49y old  Male who presents with a chief complaint of toe (20 May 2020 13:20)      Reason For Consult: dm2 uncontrolled    HPI:  51 male 50 yo M with hx CVA in past, is on Eliquis (unclear what time last took), also h/o AF, HTN, hyperthyroid, DM, BPH presents to ER c/o left foot pain, states he has neuropathy, may have stepped onto something that caused an abrasion, developed into swelling, redness and pain, worse with palpation and walking, patient was reluctant to come into the hospital due to the corona virus,    In the ed patient had X ray soft tissue air and bony erosion around the distal fifth metatarsal highly concerning for osteomyelitis.   Patient received Vancomycin and Zosyn in the ed. (19 May 2020 00:17)      PAST MEDICAL & SURGICAL HISTORY:  Cerebrovascular accident  CAD S/P percutaneous coronary angioplasty  Osteomyelitis: s/p debridement  History of non-ST elevation myocardial infarction (NSTEMI)  Pulmonary embolism  Perforated gastric ulcer: s/p emergent ex-lap omentopexy and plication 6/2019  BPH (benign prostatic hyperplasia)  Hypertension  Afib  Diabetes mellitus with no complication  Diabetes  Traumatic amputation of left foot, initial encounter  Perforated gastric ulcer  H/O abdominal surgery      FAMILY HISTORY:  FH: stroke: Father  FH: coronary artery disease: Father  FH: pulmonary embolism: Mother        Social History:    MEDICATIONS  (STANDING):  atorvastatin 80 milliGRAM(s) Oral at bedtime  bethanechol 25 milliGRAM(s) Oral three times a day  clopidogrel Tablet 75 milliGRAM(s) Oral daily  dextrose 5%. 1000 milliLiter(s) (50 mL/Hr) IV Continuous <Continuous>  dextrose 50% Injectable 12.5 Gram(s) IV Push once  dextrose 50% Injectable 25 Gram(s) IV Push once  dextrose 50% Injectable 25 Gram(s) IV Push once  insulin glargine Injectable (LANTUS) 15 Unit(s) SubCutaneous at bedtime  insulin lispro (HumaLOG) corrective regimen sliding scale   SubCutaneous three times a day before meals  insulin lispro (HumaLOG) corrective regimen sliding scale   SubCutaneous at bedtime  isosorbide   mononitrate ER Tablet (IMDUR) 30 milliGRAM(s) Oral daily  losartan 25 milliGRAM(s) Oral daily  methimazole 10 milliGRAM(s) Oral daily  metoprolol tartrate 25 milliGRAM(s) Oral two times a day  pantoprazole    Tablet 40 milliGRAM(s) Oral before breakfast  piperacillin/tazobactam IVPB.. 3.375 Gram(s) IV Intermittent every 8 hours  vancomycin  IVPB 1000 milliGRAM(s) IV Intermittent every 12 hours    MEDICATIONS  (PRN):  dextrose 40% Gel 15 Gram(s) Oral once PRN Blood Glucose LESS THAN 70 milliGRAM(s)/deciliter  glucagon  Injectable 1 milliGRAM(s) IntraMuscular once PRN Glucose LESS THAN 70 milligrams/deciliter        T(C): 36.6 (05-20-20 @ 14:27), Max: 37.2 (05-20-20 @ 10:40)  HR: 66 (05-20-20 @ 14:27) (61 - 69)  BP: 110/63 (05-20-20 @ 14:27) (106/61 - 143/73)  RR: 17 (05-20-20 @ 14:27) (11 - 18)  SpO2: 93% (05-20-20 @ 14:27) (90% - 96%)  Wt(kg): --    PHYSICAL EXAM:  GENERAL: NAD, well-groomed, well-developed  HEAD:  Atraumatic, Normocephalic  NECK: Supple, No JVD, Normal thyroid  CHEST/LUNG: Clear to percussion bilaterally; No rales, rhonchi, wheezing, or rubs  HEART: Regular rate and rhythm; No murmurs, rubs, or gallops  ABDOMEN: Soft, Nontender, Nondistended; Bowel sounds present  EXTREMITIES:  left lower extremity erythema    CAPILLARY BLOOD GLUCOSE      POCT Blood Glucose.: 169 mg/dL (20 May 2020 16:50)  POCT Blood Glucose.: 277 mg/dL (20 May 2020 11:55)  POCT Blood Glucose.: 276 mg/dL (20 May 2020 09:32)  POCT Blood Glucose.: 253 mg/dL (20 May 2020 06:30)  POCT Blood Glucose.: 239 mg/dL (19 May 2020 20:32)                            11.0   9.11  )-----------( 326      ( 19 May 2020 07:08 )             35.1       CMP:  05-19 @ 07:08  SGPT --  Albumin --   Alk Phos --   Anion Gap 8   SGOT --   Total Bili --   BUN 8   Calcium Total 8.7   CO2 26   Chloride 104   Creatinine 0.92   eGFR if    eGFR if non AA 96   Glucose 319   Potassium 3.9   Protein --   Sodium 138      Thyroid Function Tests:      Diabetes Tests:       Radiology:

## 2020-05-20 NOTE — PROGRESS NOTE ADULT - ASSESSMENT
48 yo male with PMHx significant of CAD s/p BMS to RCA (8/2019), RUL subsegmental PE (6/2019, on Eliquis), hx of NSTEMI and PAF s/p ex-lap omentopexy and plication for perforated antral ulcer (5/2019), osteomyelitis s/p debridement, patient had X ray soft tissue air and bony erosion around the distal fifth metatarsal highly concerning for osteomyelitis.   Plan is for left toe amputation CVA s/p tPA (8/2019), HTN and DM2 (not on insulin)     foot osteomyelitis  -now post op transmetatarsal   -antibiotics vascular podiatry follow up     Afib  -eliquis when okay with podiatry ( also with hx pe 2016 on eliquis)  - Continue with Lopressor BID with hold parameters  - Echo 12/24 as delineated above revealing severe LVH, normal systolic function ef 65% grade 2 diastolic dysfunction, mild MR, trace TR     CAD s/p PCI  - No symptoms suggestive of ischemia  - currently on  Plavix 75 QD   - Continue Statin  - Continue Imdur  - Continue ARB and beta blocker     DM  -management as per primary    Yissel Alvarez FNP-C  Cardiology NP  [id number] [phone]

## 2020-05-20 NOTE — DIETITIAN INITIAL EVALUATION ADULT. - ADD RECOMMEND
1) Continue Consistent CHO diet. 2) Encourage adequate intake as tolerated. Obtain/honor dietary preferences. 3) f/u with outpatient endocrinology/RD services for long-term management of T2DM following discharge. 3) Monitor weights, labs, intake, GI tolerance, skin integrity.

## 2020-05-20 NOTE — CONSULT NOTE ADULT - PROBLEM SELECTOR RECOMMENDATION 9
add humalog 4 units 3x/day before meals  cont mod dose humalog scale coverage qac/qhs  increase lantus 20 units qhs  cont cons cho diet  goal bg 100-180 in hosp setting

## 2020-05-21 DIAGNOSIS — E05.90 THYROTOXICOSIS, UNSPECIFIED WITHOUT THYROTOXIC CRISIS OR STORM: ICD-10-CM

## 2020-05-21 LAB
ANION GAP SERPL CALC-SCNC: 6 MMOL/L — SIGNIFICANT CHANGE UP (ref 5–17)
BUN SERPL-MCNC: 14 MG/DL — SIGNIFICANT CHANGE UP (ref 7–23)
CALCIUM SERPL-MCNC: 8.2 MG/DL — LOW (ref 8.5–10.1)
CHLORIDE SERPL-SCNC: 104 MMOL/L — SIGNIFICANT CHANGE UP (ref 96–108)
CO2 SERPL-SCNC: 27 MMOL/L — SIGNIFICANT CHANGE UP (ref 22–31)
CREAT SERPL-MCNC: 1 MG/DL — SIGNIFICANT CHANGE UP (ref 0.5–1.3)
GLUCOSE SERPL-MCNC: 244 MG/DL — HIGH (ref 70–99)
GRAM STN FLD: SIGNIFICANT CHANGE UP
GRAM STN FLD: SIGNIFICANT CHANGE UP
HCT VFR BLD CALC: 27.4 % — LOW (ref 39–50)
HGB BLD-MCNC: 8.6 G/DL — LOW (ref 13–17)
MCHC RBC-ENTMCNC: 27.7 PG — SIGNIFICANT CHANGE UP (ref 27–34)
MCHC RBC-ENTMCNC: 31.4 GM/DL — LOW (ref 32–36)
MCV RBC AUTO: 88.1 FL — SIGNIFICANT CHANGE UP (ref 80–100)
NRBC # BLD: 0 /100 WBCS — SIGNIFICANT CHANGE UP (ref 0–0)
PLATELET # BLD AUTO: 351 K/UL — SIGNIFICANT CHANGE UP (ref 150–400)
POTASSIUM SERPL-MCNC: 3.9 MMOL/L — SIGNIFICANT CHANGE UP (ref 3.5–5.3)
POTASSIUM SERPL-SCNC: 3.9 MMOL/L — SIGNIFICANT CHANGE UP (ref 3.5–5.3)
RBC # BLD: 3.11 M/UL — LOW (ref 4.2–5.8)
RBC # FLD: 15.9 % — HIGH (ref 10.3–14.5)
SODIUM SERPL-SCNC: 137 MMOL/L — SIGNIFICANT CHANGE UP (ref 135–145)
VANCOMYCIN TROUGH SERPL-MCNC: 7.5 UG/ML — LOW (ref 10–20)
WBC # BLD: 12.35 K/UL — HIGH (ref 3.8–10.5)
WBC # FLD AUTO: 12.35 K/UL — HIGH (ref 3.8–10.5)

## 2020-05-21 PROCEDURE — 99232 SBSQ HOSP IP/OBS MODERATE 35: CPT

## 2020-05-21 PROCEDURE — 99024 POSTOP FOLLOW-UP VISIT: CPT

## 2020-05-21 RX ORDER — VANCOMYCIN HCL 1 G
1250 VIAL (EA) INTRAVENOUS EVERY 12 HOURS
Refills: 0 | Status: DISCONTINUED | OUTPATIENT
Start: 2020-05-21 | End: 2020-05-22

## 2020-05-21 RX ORDER — INSULIN LISPRO 100/ML
4 VIAL (ML) SUBCUTANEOUS
Refills: 0 | Status: DISCONTINUED | OUTPATIENT
Start: 2020-05-21 | End: 2020-05-23

## 2020-05-21 RX ADMIN — PANTOPRAZOLE SODIUM 40 MILLIGRAM(S): 20 TABLET, DELAYED RELEASE ORAL at 06:12

## 2020-05-21 RX ADMIN — MORPHINE SULFATE 2 MILLIGRAM(S): 50 CAPSULE, EXTENDED RELEASE ORAL at 10:52

## 2020-05-21 RX ADMIN — Medication 166.67 MILLIGRAM(S): at 21:32

## 2020-05-21 RX ADMIN — Medication 2: at 21:38

## 2020-05-21 RX ADMIN — Medication 4: at 16:45

## 2020-05-21 RX ADMIN — PIPERACILLIN AND TAZOBACTAM 25 GRAM(S): 4; .5 INJECTION, POWDER, LYOPHILIZED, FOR SOLUTION INTRAVENOUS at 16:50

## 2020-05-21 RX ADMIN — CLOPIDOGREL BISULFATE 75 MILLIGRAM(S): 75 TABLET, FILM COATED ORAL at 13:57

## 2020-05-21 RX ADMIN — Medication 4: at 07:56

## 2020-05-21 RX ADMIN — MORPHINE SULFATE 2 MILLIGRAM(S): 50 CAPSULE, EXTENDED RELEASE ORAL at 21:04

## 2020-05-21 RX ADMIN — Medication 25 MILLIGRAM(S): at 21:04

## 2020-05-21 RX ADMIN — Medication 4 UNIT(S): at 11:49

## 2020-05-21 RX ADMIN — PIPERACILLIN AND TAZOBACTAM 25 GRAM(S): 4; .5 INJECTION, POWDER, LYOPHILIZED, FOR SOLUTION INTRAVENOUS at 08:44

## 2020-05-21 RX ADMIN — PIPERACILLIN AND TAZOBACTAM 25 GRAM(S): 4; .5 INJECTION, POWDER, LYOPHILIZED, FOR SOLUTION INTRAVENOUS at 01:13

## 2020-05-21 RX ADMIN — MORPHINE SULFATE 2 MILLIGRAM(S): 50 CAPSULE, EXTENDED RELEASE ORAL at 06:03

## 2020-05-21 RX ADMIN — Medication 25 MILLIGRAM(S): at 21:05

## 2020-05-21 RX ADMIN — Medication 25 MILLIGRAM(S): at 06:12

## 2020-05-21 RX ADMIN — LOSARTAN POTASSIUM 25 MILLIGRAM(S): 100 TABLET, FILM COATED ORAL at 06:11

## 2020-05-21 RX ADMIN — ATORVASTATIN CALCIUM 80 MILLIGRAM(S): 80 TABLET, FILM COATED ORAL at 21:04

## 2020-05-21 RX ADMIN — PIPERACILLIN AND TAZOBACTAM 25 GRAM(S): 4; .5 INJECTION, POWDER, LYOPHILIZED, FOR SOLUTION INTRAVENOUS at 23:27

## 2020-05-21 RX ADMIN — Medication 8: at 11:50

## 2020-05-21 RX ADMIN — MORPHINE SULFATE 2 MILLIGRAM(S): 50 CAPSULE, EXTENDED RELEASE ORAL at 16:49

## 2020-05-21 RX ADMIN — Medication 25 MILLIGRAM(S): at 06:16

## 2020-05-21 RX ADMIN — Medication 4 UNIT(S): at 16:45

## 2020-05-21 RX ADMIN — Medication 25 MILLIGRAM(S): at 13:58

## 2020-05-21 RX ADMIN — INSULIN GLARGINE 15 UNIT(S): 100 INJECTION, SOLUTION SUBCUTANEOUS at 21:04

## 2020-05-21 RX ADMIN — Medication 250 MILLIGRAM(S): at 06:03

## 2020-05-21 RX ADMIN — ISOSORBIDE MONONITRATE 30 MILLIGRAM(S): 60 TABLET, EXTENDED RELEASE ORAL at 11:49

## 2020-05-21 NOTE — PROGRESS NOTE ADULT - ASSESSMENT
49 male w PMH sig for DM<, CADneuropathy, adm with purulent left foot highly concerning for osteomyelitis.    Percutaneous tenotomy of Achilles tendon with general anesthesia 20-May-2020 10:08:08  Parviz Todd  Transmetatarsal amputation 20-May-2020 10:07:55  Parviz Todd.

## 2020-05-21 NOTE — PROGRESS NOTE ADULT - SUBJECTIVE AND OBJECTIVE BOX
Patient is a 49y old  Male who presents with a chief complaint of toe infection (20 May 2020 19:37)      INTERVAL /OVERNIGHT EVENTS: occasional left foot pain    MEDICATIONS  (STANDING):  atorvastatin 80 milliGRAM(s) Oral at bedtime  bethanechol 25 milliGRAM(s) Oral three times a day  clopidogrel Tablet 75 milliGRAM(s) Oral daily  dextrose 5%. 1000 milliLiter(s) (50 mL/Hr) IV Continuous <Continuous>  dextrose 50% Injectable 12.5 Gram(s) IV Push once  dextrose 50% Injectable 25 Gram(s) IV Push once  dextrose 50% Injectable 25 Gram(s) IV Push once  insulin glargine Injectable (LANTUS) 15 Unit(s) SubCutaneous at bedtime  insulin lispro (HumaLOG) corrective regimen sliding scale   SubCutaneous three times a day before meals  insulin lispro (HumaLOG) corrective regimen sliding scale   SubCutaneous at bedtime  insulin lispro Injectable (HumaLOG) 4 Unit(s) SubCutaneous three times a day before meals  isosorbide   mononitrate ER Tablet (IMDUR) 30 milliGRAM(s) Oral daily  losartan 25 milliGRAM(s) Oral daily  methimazole 10 milliGRAM(s) Oral daily  metoprolol tartrate 25 milliGRAM(s) Oral two times a day  pantoprazole    Tablet 40 milliGRAM(s) Oral before breakfast  piperacillin/tazobactam IVPB.. 3.375 Gram(s) IV Intermittent every 8 hours  vancomycin  IVPB 1000 milliGRAM(s) IV Intermittent every 12 hours    MEDICATIONS  (PRN):  acetaminophen   Tablet .. 650 milliGRAM(s) Oral every 6 hours PRN Mild Pain (1 - 3)  dextrose 40% Gel 15 Gram(s) Oral once PRN Blood Glucose LESS THAN 70 milliGRAM(s)/deciliter  glucagon  Injectable 1 milliGRAM(s) IntraMuscular once PRN Glucose LESS THAN 70 milligrams/deciliter  morphine  - Injectable 2 milliGRAM(s) IV Push every 4 hours PRN Severe Pain (7 - 10)  oxyCODONE    IR 5 milliGRAM(s) Oral every 6 hours PRN Severe Pain (7 - 10)  traMADol 25 milliGRAM(s) Oral every 6 hours PRN Moderate Pain (4 - 6)      Allergies    fish (Hives)  No Known Drug Allergies    Intolerances        REVIEW OF SYSTEMS:  CONSTITUTIONAL: No fever, weight loss, or fatigue  EYES: No eye pain, visual disturbances, or discharge  ENMT:  No difficulty hearing, tinnitus, vertigo; No sinus or throat pain  NECK: No pain or stiffness  RESPIRATORY: No cough, wheezing, chills or hemoptysis; No shortness of breath  CARDIOVASCULAR: No chest pain, palpitations, dizziness, or leg swelling  GASTROINTESTINAL: No abdominal or epigastric pain. No nausea, vomiting, or hematemesis; No diarrhea or constipation. No melena or hematochezia.  GENITOURINARY: No dysuria, frequency, hematuria, or incontinence  NEUROLOGICAL: No headaches, memory loss, loss of strength, numbness, or tremors  SKIN: No itching, burning, rashes, or lesions   LYMPH NODES: No enlarged glands  ENDOCRINE: No heat or cold intolerance; No hair loss; No polydipsia or polyuria  MUSCULOSKELETAL: No joint pain or swelling; No muscle, back, or extremity pain  PSYCHIATRIC: No depression, anxiety, mood swings, or difficulty sleeping  HEME/LYMPH: No easy bruising, or bleeding gums  ALLERGY AND IMMUNOLOGIC: No hives or eczema    Vital Signs Last 24 Hrs  T(C): 36.7 (21 May 2020 13:29), Max: 38.4 (20 May 2020 21:59)  T(F): 98.1 (21 May 2020 13:29), Max: 101.1 (20 May 2020 21:59)  HR: 76 (21 May 2020 13:29) (69 - 76)  BP: 106/66 (21 May 2020 13:29) (106/66 - 131/75)  BP(mean): --  RR: 18 (21 May 2020 13:29) (17 - 18)  SpO2: 91% (21 May 2020 13:29) (91% - 97%)    PHYSICAL EXAM:  GENERAL: NAD, well-groomed, well-developed  HEAD:  Atraumatic, Normocephalic  EYES: EOMI, PERRLA, conjunctiva and sclera clear  ENMT: No tonsillar erythema, exudates, or enlargement; Moist mucous membranes, Good dentition, No lesions  NECK: Supple, No JVD, Normal thyroid  NERVOUS SYSTEM:  Alert & Oriented X3, Good concentration; Motor Strength 5/5 B/L upper and lower extremities; DTRs 2+ intact and symmetric  CHEST/LUNG: Clear to auscultation bilaterally; No rales, rhonchi, wheezing, or rubs  HEART: Regular rate and rhythm; No murmurs, rubs, or gallops  ABDOMEN: Soft, Nontender, Nondistended; Bowel sounds present  EXTREMITIES:  left foot in bandage  LYMPH: No lymphadenopathy noted  SKIN: No rashes or lesions    LABS:                        8.6    12.35 )-----------( 351      ( 21 May 2020 14:24 )             27.4     21 May 2020 14:24    137    |  104    |  14     ----------------------------<  244    3.9     |  27     |  1.00     Ca    8.2        21 May 2020 14:24          CAPILLARY BLOOD GLUCOSE      POCT Blood Glucose.: 339 mg/dL (21 May 2020 11:36)  POCT Blood Glucose.: 219 mg/dL (21 May 2020 07:35)  POCT Blood Glucose.: 246 mg/dL (20 May 2020 21:59)  POCT Blood Glucose.: 169 mg/dL (20 May 2020 16:50)      RADIOLOGY & ADDITIONAL TESTS:    Notes Reviewed:  [x ] YES  [ ] NO    Care Discussed with Consultants/Other Providers [x ] YES  [ ] NO

## 2020-05-21 NOTE — PROGRESS NOTE ADULT - SUBJECTIVE AND OBJECTIVE BOX
[facility] -- Bolivar Carbajal, Brittany Gallegos, Reynaldo Sweeney Savella, [place]  Office # [phone]    Follow Up:  Preop Postop Optimization    Subjective/Observations: Awake and alert.  Comfortable on RA.  c/o left foot pain, though, denies any respiratory or cardiac discomfort    REVIEW OF SYSTEMS: All other review of systems is negative unless indicated above  PAST MEDICAL & SURGICAL HISTORY:  Cerebrovascular accident  CAD S/P percutaneous coronary angioplasty  Osteomyelitis: s/p debridement  History of non-ST elevation myocardial infarction (NSTEMI)  Pulmonary embolism  Perforated gastric ulcer: s/p emergent ex-lap omentopexy and plication 6/2019  BPH (benign prostatic hyperplasia)  Hypertension  Afib  Diabetes mellitus with no complication  Diabetes  Traumatic amputation of left foot, initial encounter  Perforated gastric ulcer  H/O abdominal surgery    MEDICATIONS  (STANDING):  atorvastatin 80 milliGRAM(s) Oral at bedtime  bethanechol 25 milliGRAM(s) Oral three times a day  clopidogrel Tablet 75 milliGRAM(s) Oral daily  dextrose 5%. 1000 milliLiter(s) (50 mL/Hr) IV Continuous <Continuous>  dextrose 50% Injectable 12.5 Gram(s) IV Push once  dextrose 50% Injectable 25 Gram(s) IV Push once  dextrose 50% Injectable 25 Gram(s) IV Push once  insulin glargine Injectable (LANTUS) 15 Unit(s) SubCutaneous at bedtime  insulin lispro (HumaLOG) corrective regimen sliding scale   SubCutaneous three times a day before meals  insulin lispro (HumaLOG) corrective regimen sliding scale   SubCutaneous at bedtime  insulin lispro Injectable (HumaLOG) 4 Unit(s) SubCutaneous three times a day before meals  isosorbide   mononitrate ER Tablet (IMDUR) 30 milliGRAM(s) Oral daily  losartan 25 milliGRAM(s) Oral daily  methimazole 10 milliGRAM(s) Oral daily  metoprolol tartrate 25 milliGRAM(s) Oral two times a day  pantoprazole    Tablet 40 milliGRAM(s) Oral before breakfast  piperacillin/tazobactam IVPB.. 3.375 Gram(s) IV Intermittent every 8 hours  vancomycin  IVPB 1000 milliGRAM(s) IV Intermittent every 12 hours    MEDICATIONS  (PRN):  acetaminophen   Tablet .. 650 milliGRAM(s) Oral every 6 hours PRN Mild Pain (1 - 3)  dextrose 40% Gel 15 Gram(s) Oral once PRN Blood Glucose LESS THAN 70 milliGRAM(s)/deciliter  glucagon  Injectable 1 milliGRAM(s) IntraMuscular once PRN Glucose LESS THAN 70 milligrams/deciliter  morphine  - Injectable 2 milliGRAM(s) IV Push every 4 hours PRN Severe Pain (7 - 10)  oxyCODONE    IR 5 milliGRAM(s) Oral every 6 hours PRN Severe Pain (7 - 10)  traMADol 25 milliGRAM(s) Oral every 6 hours PRN Moderate Pain (4 - 6)    Allergies    fish (Hives)  No Known Drug Allergies    Intolerances    Vital Signs Last 24 Hrs  T(C): 36.4 (21 May 2020 04:48), Max: 38.4 (20 May 2020 21:59)  T(F): 97.6 (21 May 2020 04:48), Max: 101.1 (20 May 2020 21:59)  HR: 69 (21 May 2020 04:48) (66 - 74)  BP: 131/75 (21 May 2020 04:48) (110/63 - 131/75)  BP(mean): --  RR: 17 (21 May 2020 04:48) (17 - 17)  SpO2: 97% (21 May 2020 04:48) (93% - 97%)  I&O's Summary    20 May 2020 07:01  -  21 May 2020 07:00  --------------------------------------------------------  IN: 236 mL / OUT: 2300 mL / NET: -2064 mL    21 May 2020 07:01  -  21 May 2020 12:26  --------------------------------------------------------  IN: 240 mL / OUT: 0 mL / NET: 240 mL     PHYSICAL EXAM:  TELE: Not on tele  Constitutional: NAD, awake and alert, obese  HEENT: Moist Mucous Membranes, Anicteric  Pulmonary: Non-labored, breath sounds are clear bilaterally, No wheezing, rales or rhonchi  Cardiovascular: Regular, S1 and S2, No murmurs, rubs, gallops or clicks  Gastrointestinal: Bowel Sounds present, soft, nontender.   Lymph: No peripheral edema. No lymphadenopathy.  Skin: No visible rashes.  Left foot dressing dry and intact; Right plantar ulcer and right leg blister  Psych:  Mood & affect appropriate  LABS: All Labs Reviewed:                        11.0   9.11  )-----------( 326      ( 19 May 2020 07:08 )             35.1                         10.8   9.38  )-----------( 303      ( 18 May 2020 21:21 )             34.4     19 May 2020 07:08    138    |  104    |  8      ----------------------------<  319    3.9     |  26     |  0.92   18 May 2020 21:21    135    |  104    |  10     ----------------------------<  345    3.8     |  25     |  0.95     Ca    8.7        19 May 2020 07:08  Ca    8.3        18 May 2020 21:21  Mg     1.9       18 May 2020 21:21    TPro  7.0    /  Alb  2.2    /  TBili  0.2    /  DBili  x      /  AST  19     /  ALT  25     /  AlkPhos  114    18 May 2020 21:21    < from: TTE Echo Doppler w/o Cont (12.24.19 @ 13:55) >     EXAM:  ECHO TTE WO CON COMP W DOPPLR         PROCEDURE DATE:  12/24/2019        INTERPRETATION:  INDICATION: Coronary artery disease    Blood Pressure 117/71    Height 187     Weight 93       BSA 2.2    Dimensions:    LA 4.2       Normal Values: 2.0 - 4.0 cm    Ao 4.0        Normal Values: 2.0 - 3.8 cm  SEPTUM 1.6       Normal Values: 0.6 - 1.2 cm  PWT 1.6       Normal Values: 0.6 - 1.1 cm  LVIDd 5.8         Normal Values: 3.0 - 5.6 cm  LVIDs 3.2         Normal Values: 1.8 - 4.0 cm    Derived Variables:  LVMI g/m2  RWT  Fractional Short  Ejection Fraction    Doppler Peak v. AoV= (m/sec)    OBSERVATIONS:    Mitral Valve: normal, mild MR.  Aortic Valve/Aorta: normal trileaflet aortic valve.  Tricuspid Valve: normal with trace TR.  Pulmonic Valve: normal  Left Atrium: Enlarged  Right Atrium: normal  Left Ventricle: Severe concentric LVH with normal systolic function, estimated LVEF of 65%.  Right Ventricle: normal size and systolic function.  Pericardium/Pleura: no significant pleural effusion, no significant pericardial effusion.  Pulmonary/RV Pressure: Inadequate TR jet to estimate PA systolic pressure  LV Diastolic Function: Grade 2diastolic dysfunction.    Severe concentric LVH, and mild LV enlargement, with normal systolic function, estimated LVEF of 65%. Normal right ventricular size and systolic function. Grade 2diastolic dysfunction. Left atrial enlargement The aortic root is normal in size. The mitral valve is structurally normal, mild MR. The aortic valve is trileaflet without stenosis or insufficiency. Trace physiologic TR. Inadequate TR jet to estimate PA systolic pressure No significant pericardial effusion.      JOVANNI ALVAREZ M.D., ATTENDING CARDIOLOGIST  This document has been electronically signed. Dec 25 2019 11:45AM      < end of copied text >    < from: Xray Chest 1 View AP/PA (05.18.20 @ 19:34) >    EXAM:  XR CHEST AP OR PA 1V                            PROCEDURE DATE:  05/18/2020          INTERPRETATION:    AP semierect chest on May 18, 2020 at 7:19 PM.    Patient is admitted with infection of the left foot likely osteomyelitis. Patient has history of bradycardia, leg edema, atrial fibrillation, anemia, gastric ulcer, type 2 diabetes, hypertension, and TIA.    Heart is magnified by technique.    Chronic appearing blunting of the left base laterally again noted.    Chest is similar to December 23, 2019.    IMPRESSION: Unchanged chest as above.    DISCRETE X-RAY DATA:  Percent of LEFT lung opacification: 1-33%  Percent of RIGHT lung opacification: Clear  Change in lung opacification from most recent x-ray (<=3 days): No Prior  Change from prior dated 3 or more days (same admission): No Prior    ABHISHEK ESPINO M.D., ATTENDING RADIOLOGIST  This document has been electronically signed. May 18 2020  7:39PM      < end of copied text >    < from: 12 Lead ECG (05.20.20 @ 12:29) >    Ventricular Rate 66 BPM    Atrial Rate 66 BPM    P-R Interval 128 ms    QRS Duration 82 ms    Q-T Interval 404 ms    QTC Calculation(Bezet) 423 ms    P Axis 35 degrees    R Axis 10 degrees    T Axis 32 degrees    Diagnosis Line Normal sinus rhythm  Cannot rule out Inferior infarct , age undetermined  Abnormal ECG  When compared with ECG of 23-DEC-2019 15:36,  Nonspecific T wave abnormality, improved in Inferior leads  T wave amplitude has increased in Anterior leads  Confirmed by DAT CALLAHAN ([id number]) on 5/20/2020 6:06:29 PM    < end of copied text >

## 2020-05-21 NOTE — PROGRESS NOTE ADULT - SUBJECTIVE AND OBJECTIVE BOX
CAPILLARY BLOOD GLUCOSE      POCT Blood Glucose.: 219 mg/dL (21 May 2020 07:35)  POCT Blood Glucose.: 246 mg/dL (20 May 2020 21:59)  POCT Blood Glucose.: 169 mg/dL (20 May 2020 16:50)  POCT Blood Glucose.: 277 mg/dL (20 May 2020 11:55)      Vital Signs Last 24 Hrs  T(C): 36.4 (21 May 2020 04:48), Max: 38.4 (20 May 2020 21:59)  T(F): 97.6 (21 May 2020 04:48), Max: 101.1 (20 May 2020 21:59)  HR: 69 (21 May 2020 04:48) (63 - 74)  BP: 131/75 (21 May 2020 04:48) (110/60 - 131/75)  BP(mean): --  RR: 17 (21 May 2020 04:48) (16 - 18)  SpO2: 97% (21 May 2020 04:48) (93% - 97%)    General: WN/WD NAD  Respiratory: CTA B/L  CV: RRR, S1S2, no murmurs, rubs or gallops  Abdominal: Soft, NT, ND +BS, Last BM  Extremities: le foot dsg intact             atorvastatin 80 milliGRAM(s) Oral at bedtime  dextrose 40% Gel 15 Gram(s) Oral once PRN  dextrose 50% Injectable 12.5 Gram(s) IV Push once  dextrose 50% Injectable 25 Gram(s) IV Push once  dextrose 50% Injectable 25 Gram(s) IV Push once  glucagon  Injectable 1 milliGRAM(s) IntraMuscular once PRN  insulin glargine Injectable (LANTUS) 15 Unit(s) SubCutaneous at bedtime  insulin lispro (HumaLOG) corrective regimen sliding scale   SubCutaneous three times a day before meals  insulin lispro (HumaLOG) corrective regimen sliding scale   SubCutaneous at bedtime  methimazole 10 milliGRAM(s) Oral daily

## 2020-05-21 NOTE — PROGRESS NOTE ADULT - SUBJECTIVE AND OBJECTIVE BOX
49y year old Male seen at [facility] 240 W1 s/p left foot transmetatarsal amputation with achilles tenotomy. Patient relates no overnight events and states that they are doing well at this time. Denies any fever, chills, nausea, vomiting, chest pain, shortness of breath, or calf pain at this time.    Allergies  fish (Hives)  No Known Drug Allergies    MEDICATIONS  (STANDING):  atorvastatin 80 milliGRAM(s) Oral at bedtime  bethanechol 25 milliGRAM(s) Oral three times a day  clopidogrel Tablet 75 milliGRAM(s) Oral daily  dextrose 5%. 1000 milliLiter(s) (50 mL/Hr) IV Continuous <Continuous>  dextrose 50% Injectable 12.5 Gram(s) IV Push once  dextrose 50% Injectable 25 Gram(s) IV Push once  dextrose 50% Injectable 25 Gram(s) IV Push once  insulin glargine Injectable (LANTUS) 15 Unit(s) SubCutaneous at bedtime  insulin lispro (HumaLOG) corrective regimen sliding scale   SubCutaneous three times a day before meals  insulin lispro (HumaLOG) corrective regimen sliding scale   SubCutaneous at bedtime  insulin lispro Injectable (HumaLOG) 4 Unit(s) SubCutaneous three times a day before meals  isosorbide   mononitrate ER Tablet (IMDUR) 30 milliGRAM(s) Oral daily  losartan 25 milliGRAM(s) Oral daily  methimazole 10 milliGRAM(s) Oral daily  metoprolol tartrate 25 milliGRAM(s) Oral two times a day  pantoprazole    Tablet 40 milliGRAM(s) Oral before breakfast  piperacillin/tazobactam IVPB.. 3.375 Gram(s) IV Intermittent every 8 hours  vancomycin  IVPB 1000 milliGRAM(s) IV Intermittent every 12 hours    MEDICATIONS  (PRN):  acetaminophen   Tablet .. 650 milliGRAM(s) Oral every 6 hours PRN Mild Pain (1 - 3)  dextrose 40% Gel 15 Gram(s) Oral once PRN Blood Glucose LESS THAN 70 milliGRAM(s)/deciliter  glucagon  Injectable 1 milliGRAM(s) IntraMuscular once PRN Glucose LESS THAN 70 milligrams/deciliter  morphine  - Injectable 2 milliGRAM(s) IV Push every 4 hours PRN Severe Pain (7 - 10)  oxyCODONE    IR 5 milliGRAM(s) Oral every 6 hours PRN Severe Pain (7 - 10)  traMADol 25 milliGRAM(s) Oral every 6 hours PRN Moderate Pain (4 - 6)    Vital Signs Last 24 Hrs  T(C): 36.7 (21 May 2020 13:29), Max: 38.4 (20 May 2020 21:59)  T(F): 98.1 (21 May 2020 13:29), Max: 101.1 (20 May 2020 21:59)  HR: 76 (21 May 2020 13:29) (69 - 76)  BP: 106/66 (21 May 2020 13:29) (106/66 - 131/75)  BP(mean): --  RR: 18 (21 May 2020 13:29) (17 - 18)  SpO2: 91% (21 May 2020 13:29) (91% - 97%)      PHYSICAL EXAM:  Vascular: DP & PT nonpalpable bilaterally, Capillary refill 5 seconds  Neurological: Light touch sensation not intact bilaterally  Musculoskeletal: 5/5 strength in all quadrants bilaterally, AJ & STJ ROM intact, s/p left 1st partial ray amputation and 5th metatarsal head amputation  Dermatological: Incision sites of the left foot TMA site and achilles tenotomy site noted with intact sutures, no dehiscence, mild winston-incision erythema, no proximal streaking, no fluctuance, no malodor, no signs of infection at this time.      CBC Full  -  ( 21 May 2020 14:24 )  WBC Count : 12.35 K/uL  RBC Count : 3.11 M/uL  Hemoglobin : 8.6 g/dL  Hematocrit : 27.4 %  Platelet Count - Automated : 351 K/uL  Mean Cell Volume : 88.1 fl  Mean Cell Hemoglobin : 27.7 pg  Mean Cell Hemoglobin Concentration : 31.4 gm/dL  Auto Neutrophil # : x  Auto Lymphocyte # : x  Auto Monocyte # : x  Auto Eosinophil # : x  Auto Basophil # : x  Auto Neutrophil % : x  Auto Lymphocyte % : x  Auto Monocyte % : x  Auto Eosinophil % : x  Auto Basophil % : x    05-21    137  |  104  |  14  ----------------------------<  244<H>  3.9   |  27  |  1.00    Ca    8.2<L>      21 May 2020 14:24        Culture - Tissue with Gram Stain (collected 20 May 2020 12:01)  Source: .Tissue Other, left forefoot clean marginbone  Gram Stain (20 May 2020 13:54):    No polymorphonuclear cells seen per low power field    No organisms seen per oil power field  Preliminary Report (21 May 2020 11:41):    Rare Staphylococcus aureus    Culture - Tissue with Gram Stain (collected 20 May 2020 12:00)  Source: .Tissue Other, left forefoot bone culture  Gram Stain (21 May 2020 11:38):    Upon re-evaluation of gram stain: Numerous Gram positive cocci in pairs,    chains and clusters    per oil power field    No polymorphonuclear cells seen per low power field    Previously reported as:    No organisms seen per oil power field  Preliminary Report (21 May 2020 11:32):    Moderate Mixed gram positive jakub    Culture - Blood (collected 19 May 2020 01:15)  Source: .Blood Blood-Peripheral  Preliminary Report (20 May 2020 02:01):    No growth to date.    Culture - Blood (collected 19 May 2020 01:15)  Source: .Blood Blood-Peripheral  Preliminary Report (20 May 2020 02:01):    No growth to date.    Culture - Urine (collected 19 May 2020 01:11)  Source: .Urine Catheterized  Final Report (19 May 2020 22:29):    >=3 organisms. Probable collection contamination.          Imaging: Postop radiographs reveal s/p TMA

## 2020-05-21 NOTE — PROGRESS NOTE ADULT - ASSESSMENT
48 yo male with PMHx significant of CAD s/p BMS to RCA (8/2019), RUL subsegmental PE (6/2019, on Eliquis), hx of NSTEMI and PAF s/p ex-lap omentopexy and plication for perforated antral ulcer (5/2019), osteomyelitis s/p debridement, patient had X ray soft tissue air and bony erosion around the distal fifth metatarsal highly concerning for osteomyelitis.   Plan is for left toe amputation CVA s/p tPA (8/2019), HTN and DM2 (not on insulin)     Foot OM  - s/p left foot transmetatarsal amputation  - Pain control  - Antibiotics vascular podiatry follow up     Afib Hx of PE  - EKG showed NSR  - Please resume Eliquis when okay with podiatry   - Continue with Lopressor BID with hold parameters  - Echo 12/24 as delineated above revealing severe LVH, normal systolic function ef 65% grade 2 diastolic dysfunction, mild MR, trace TR   - Monitor electrolytes, replete to keep K.4 and Mag>2    CAD s/p PCI  - No symptoms suggestive of ischemia  - Continue Plavix 75 QD   - Continue Statin  - Continue Imdur  - Continue ARB and beta blocker     DM  - Management as per primary    DVT ppx  - Per primary    Will continue to follow    Henrietta Kim DNP, NP-C  Cardiology   Spectra #[phone]

## 2020-05-21 NOTE — PROGRESS NOTE ADULT - SUBJECTIVE AND OBJECTIVE BOX
infectious diseases progress note:    SARAH MORRISON is a 49y y. o. Male patient    No concerning overnight events, pt is reporting some pain issues    Allergies    fish (Hives)  No Known Drug Allergies    Intolerances        ANTIBIOTICS/RELEVANT:  antimicrobials  piperacillin/tazobactam IVPB.. 3.375 Gram(s) IV Intermittent every 8 hours  vancomycin  IVPB 1000 milliGRAM(s) IV Intermittent every 12 hours    immunologic:    OTHER:  acetaminophen   Tablet .. 650 milliGRAM(s) Oral every 6 hours PRN  atorvastatin 80 milliGRAM(s) Oral at bedtime  bethanechol 25 milliGRAM(s) Oral three times a day  clopidogrel Tablet 75 milliGRAM(s) Oral daily  dextrose 40% Gel 15 Gram(s) Oral once PRN  dextrose 5%. 1000 milliLiter(s) IV Continuous <Continuous>  dextrose 50% Injectable 12.5 Gram(s) IV Push once  dextrose 50% Injectable 25 Gram(s) IV Push once  dextrose 50% Injectable 25 Gram(s) IV Push once  glucagon  Injectable 1 milliGRAM(s) IntraMuscular once PRN  insulin glargine Injectable (LANTUS) 15 Unit(s) SubCutaneous at bedtime  insulin lispro (HumaLOG) corrective regimen sliding scale   SubCutaneous three times a day before meals  insulin lispro (HumaLOG) corrective regimen sliding scale   SubCutaneous at bedtime  insulin lispro Injectable (HumaLOG) 4 Unit(s) SubCutaneous three times a day before meals  isosorbide   mononitrate ER Tablet (IMDUR) 30 milliGRAM(s) Oral daily  losartan 25 milliGRAM(s) Oral daily  methimazole 10 milliGRAM(s) Oral daily  metoprolol tartrate 25 milliGRAM(s) Oral two times a day  morphine  - Injectable 2 milliGRAM(s) IV Push every 4 hours PRN  oxyCODONE    IR 5 milliGRAM(s) Oral every 6 hours PRN  pantoprazole    Tablet 40 milliGRAM(s) Oral before breakfast  traMADol 25 milliGRAM(s) Oral every 6 hours PRN      Objective:  Vital Signs Last 24 Hrs  T(C): 36.4 (21 May 2020 04:48), Max: 38.4 (20 May 2020 21:59)  T(F): 97.6 (21 May 2020 04:48), Max: 101.1 (20 May 2020 21:59)  HR: 69 (21 May 2020 04:48) (66 - 74)  BP: 131/75 (21 May 2020 04:48) (110/63 - 131/75)  BP(mean): --  RR: 17 (21 May 2020 04:48) (17 - 17)  SpO2: 97% (21 May 2020 04:48) (93% - 97%)    T(C): 36.4 (05-21-20 @ 04:48), Max: 38.4 (05-20-20 @ 21:59)  T(C): 36.4 (05-21-20 @ 04:48), Max: 38.4 (05-20-20 @ 21:59)  T(C): 36.4 (05-21-20 @ 04:48), Max: 38.4 (05-20-20 @ 21:59)    PHYSICAL EXAM:  HEENT: NC atraumatic  Neck: supple  Respiratory: no accessory muscle use, breathing comfortably  Cardiovascular: distant  Gastrointestinal: normal appearing, nondistended  Extremities: no clubbing, no cyanosis, foot is wapped      LABS:        9.11 05-19 @ 07:08  9.38 05-18 @ 21:21              Creatinine, Serum: 0.92 mg/dL (05-19-20 @ 07:08)  Creatinine, Serum: 0.95 mg/dL (05-18-20 @ 21:21)                INFLAMMATORY MARKERS  Auto Neutrophil #: 6.87 K/uL (05-19-20 @ 07:08)  Auto Lymphocyte #: 1.17 K/uL (05-19-20 @ 07:08)  Auto Neutrophil #: 6.99 K/uL (05-18-20 @ 21:21)  Auto Lymphocyte #: 1.32 K/uL (05-18-20 @ 21:21)    Lactate, Blood: 1.4 mmol/L (05-18-20 @ 21:20)    Auto Eosinophil #: 0.17 K/uL (05-19-20 @ 07:08)  Auto Eosinophil #: 0.17 K/uL (05-18-20 @ 21:21)      Sedimentation Rate, Erythrocyte: 55 mm/hr (05-19-20 @ 07:08)  Sedimentation Rate, Erythrocyte: 60 mm/hr (05-18-20 @ 21:21)                    INR: 1.10 ratio (05-18-20 @ 21:21)  Activated Partial Thromboplastin Time: 30.4 sec (05-18-20 @ 21:21)          MICROBIOLOGY:  Culture Results:   Rare Staphylococcus aureus (05-20 @ 12:01)  Culture Results:   Moderate Mixed gram positive jakub (05-20 @ 12:00)              RADIOLOGY & ADDITIONAL STUDIES:

## 2020-05-22 LAB
-  AMPICILLIN/SULBACTAM: SIGNIFICANT CHANGE UP
-  CEFAZOLIN: SIGNIFICANT CHANGE UP
-  CLINDAMYCIN: SIGNIFICANT CHANGE UP
-  ERYTHROMYCIN: SIGNIFICANT CHANGE UP
-  GENTAMICIN: SIGNIFICANT CHANGE UP
-  OXACILLIN: SIGNIFICANT CHANGE UP
-  PENICILLIN: SIGNIFICANT CHANGE UP
-  RIFAMPIN: SIGNIFICANT CHANGE UP
-  TETRACYCLINE: SIGNIFICANT CHANGE UP
-  TRIMETHOPRIM/SULFAMETHOXAZOLE: SIGNIFICANT CHANGE UP
-  VANCOMYCIN: SIGNIFICANT CHANGE UP
HCT VFR BLD CALC: 27 % — LOW (ref 39–50)
HGB BLD-MCNC: 8.3 G/DL — LOW (ref 13–17)
METHOD TYPE: SIGNIFICANT CHANGE UP
SURGICAL PATHOLOGY STUDY: SIGNIFICANT CHANGE UP
T3FREE SERPL-MCNC: 2.41 PG/ML — SIGNIFICANT CHANGE UP (ref 1.8–4.6)
T4 FREE SERPL-MCNC: 1.4 NG/DL — SIGNIFICANT CHANGE UP (ref 0.9–1.8)
TSH SERPL-MCNC: 1.02 UIU/ML — SIGNIFICANT CHANGE UP (ref 0.36–3.74)

## 2020-05-22 PROCEDURE — 99232 SBSQ HOSP IP/OBS MODERATE 35: CPT

## 2020-05-22 RX ORDER — APIXABAN 2.5 MG/1
5 TABLET, FILM COATED ORAL EVERY 12 HOURS
Refills: 0 | Status: DISCONTINUED | OUTPATIENT
Start: 2020-05-22 | End: 2020-05-23

## 2020-05-22 RX ORDER — CEFTRIAXONE 500 MG/1
1000 INJECTION, POWDER, FOR SOLUTION INTRAMUSCULAR; INTRAVENOUS EVERY 24 HOURS
Refills: 0 | Status: DISCONTINUED | OUTPATIENT
Start: 2020-05-22 | End: 2020-05-23

## 2020-05-22 RX ADMIN — ISOSORBIDE MONONITRATE 30 MILLIGRAM(S): 60 TABLET, EXTENDED RELEASE ORAL at 11:34

## 2020-05-22 RX ADMIN — Medication 25 MILLIGRAM(S): at 20:59

## 2020-05-22 RX ADMIN — ATORVASTATIN CALCIUM 80 MILLIGRAM(S): 80 TABLET, FILM COATED ORAL at 20:59

## 2020-05-22 RX ADMIN — Medication 6: at 08:00

## 2020-05-22 RX ADMIN — CEFTRIAXONE 100 MILLIGRAM(S): 500 INJECTION, POWDER, FOR SOLUTION INTRAMUSCULAR; INTRAVENOUS at 13:52

## 2020-05-22 RX ADMIN — Medication 4: at 17:07

## 2020-05-22 RX ADMIN — PANTOPRAZOLE SODIUM 40 MILLIGRAM(S): 20 TABLET, DELAYED RELEASE ORAL at 06:01

## 2020-05-22 RX ADMIN — Medication 25 MILLIGRAM(S): at 13:52

## 2020-05-22 RX ADMIN — LOSARTAN POTASSIUM 25 MILLIGRAM(S): 100 TABLET, FILM COATED ORAL at 06:01

## 2020-05-22 RX ADMIN — Medication 4 UNIT(S): at 11:34

## 2020-05-22 RX ADMIN — Medication 25 MILLIGRAM(S): at 06:01

## 2020-05-22 RX ADMIN — CLOPIDOGREL BISULFATE 75 MILLIGRAM(S): 75 TABLET, FILM COATED ORAL at 11:34

## 2020-05-22 RX ADMIN — Medication 4 UNIT(S): at 08:00

## 2020-05-22 RX ADMIN — Medication 4 UNIT(S): at 17:06

## 2020-05-22 RX ADMIN — MORPHINE SULFATE 2 MILLIGRAM(S): 50 CAPSULE, EXTENDED RELEASE ORAL at 10:20

## 2020-05-22 RX ADMIN — PIPERACILLIN AND TAZOBACTAM 25 GRAM(S): 4; .5 INJECTION, POWDER, LYOPHILIZED, FOR SOLUTION INTRAVENOUS at 06:07

## 2020-05-22 RX ADMIN — Medication 25 MILLIGRAM(S): at 06:02

## 2020-05-22 RX ADMIN — Medication 25 MILLIGRAM(S): at 17:07

## 2020-05-22 RX ADMIN — Medication 10: at 11:34

## 2020-05-22 RX ADMIN — INSULIN GLARGINE 15 UNIT(S): 100 INJECTION, SOLUTION SUBCUTANEOUS at 21:00

## 2020-05-22 RX ADMIN — MORPHINE SULFATE 2 MILLIGRAM(S): 50 CAPSULE, EXTENDED RELEASE ORAL at 06:06

## 2020-05-22 RX ADMIN — Medication 166.67 MILLIGRAM(S): at 09:06

## 2020-05-22 RX ADMIN — MORPHINE SULFATE 2 MILLIGRAM(S): 50 CAPSULE, EXTENDED RELEASE ORAL at 18:07

## 2020-05-22 RX ADMIN — APIXABAN 5 MILLIGRAM(S): 2.5 TABLET, FILM COATED ORAL at 17:07

## 2020-05-22 NOTE — PHYSICAL THERAPY INITIAL EVALUATION ADULT - PERTINENT HX OF CURRENT PROBLEM, REHAB EVAL
48 y/o male adm 5/18 with L foot pain and erosion distal 5th metatarsal. s/p percutaneous tenotomy Achilles tendon and transmetatarsal amputation L foot, PWB

## 2020-05-22 NOTE — PHARMACOTHERAPY INTERVENTION NOTE - COMMENTS
Providing m2b for Admelog, needles, blood glucose meter and supplies
Meds to beds for insulin/testing supplies.

## 2020-05-22 NOTE — PROGRESS NOTE ADULT - SUBJECTIVE AND OBJECTIVE BOX
infectious diseases progress note:    SARAH MORRISON is a 49y y. o. Male patient    No concerning overnight events    Allergies    fish (Hives)  No Known Drug Allergies    Intolerances        ANTIBIOTICS/RELEVANT:  antimicrobials  piperacillin/tazobactam IVPB.. 3.375 Gram(s) IV Intermittent every 8 hours  vancomycin  IVPB 1250 milliGRAM(s) IV Intermittent every 12 hours    immunologic:    OTHER:  acetaminophen   Tablet .. 650 milliGRAM(s) Oral every 6 hours PRN  atorvastatin 80 milliGRAM(s) Oral at bedtime  bethanechol 25 milliGRAM(s) Oral three times a day  clopidogrel Tablet 75 milliGRAM(s) Oral daily  dextrose 40% Gel 15 Gram(s) Oral once PRN  dextrose 5%. 1000 milliLiter(s) IV Continuous <Continuous>  dextrose 50% Injectable 12.5 Gram(s) IV Push once  dextrose 50% Injectable 25 Gram(s) IV Push once  dextrose 50% Injectable 25 Gram(s) IV Push once  glucagon  Injectable 1 milliGRAM(s) IntraMuscular once PRN  insulin glargine Injectable (LANTUS) 15 Unit(s) SubCutaneous at bedtime  insulin lispro (HumaLOG) corrective regimen sliding scale   SubCutaneous three times a day before meals  insulin lispro (HumaLOG) corrective regimen sliding scale   SubCutaneous at bedtime  insulin lispro Injectable (HumaLOG) 4 Unit(s) SubCutaneous three times a day before meals  isosorbide   mononitrate ER Tablet (IMDUR) 30 milliGRAM(s) Oral daily  losartan 25 milliGRAM(s) Oral daily  methimazole 10 milliGRAM(s) Oral daily  metoprolol tartrate 25 milliGRAM(s) Oral two times a day  morphine  - Injectable 2 milliGRAM(s) IV Push every 4 hours PRN  oxyCODONE    IR 5 milliGRAM(s) Oral every 6 hours PRN  pantoprazole    Tablet 40 milliGRAM(s) Oral before breakfast  traMADol 25 milliGRAM(s) Oral every 6 hours PRN      Objective:  Vital Signs Last 24 Hrs  T(C): 37.1 (22 May 2020 04:40), Max: 37.4 (21 May 2020 21:29)  T(F): 98.8 (22 May 2020 04:40), Max: 99.3 (21 May 2020 21:29)  HR: 68 (22 May 2020 04:40) (68 - 76)  BP: 112/57 (22 May 2020 04:40) (106/66 - 123/62)  BP(mean): --  RR: 19 (22 May 2020 04:40) (18 - 19)  SpO2: 90% (22 May 2020 04:40) (90% - 94%)    T(C): 37.1 (05-22-20 @ 04:40), Max: 38.4 (05-20-20 @ 21:59)  T(C): 37.1 (05-22-20 @ 04:40), Max: 38.4 (05-20-20 @ 21:59)  T(C): 37.1 (05-22-20 @ 04:40), Max: 38.4 (05-20-20 @ 21:59)    PHYSICAL EXAM:  HEENT: NC atraumatic  Neck: supple  Respiratory: no accessory muscle use, breathing comfortably  Cardiovascular: distant  Gastrointestinal: normal appearing, nondistended  Extremities: no clubbing, no cyanosis, foot wrapped      LABS:                          8.6    12.35 )-----------( 351      ( 21 May 2020 14:24 )             27.4       12.35 05-21 @ 14:24  9.11 05-19 @ 07:08  9.38 05-18 @ 21:21      05-21    137  |  104  |  14  ----------------------------<  244<H>  3.9   |  27  |  1.00    Ca    8.2<L>      21 May 2020 14:24        Creatinine, Serum: 1.00 mg/dL (05-21-20 @ 14:24)  Creatinine, Serum: 0.92 mg/dL (05-19-20 @ 07:08)  Creatinine, Serum: 0.95 mg/dL (05-18-20 @ 21:21)      INFLAMMATORY MARKERS  Auto Neutrophil #: 6.87 K/uL (05-19-20 @ 07:08)  Auto Lymphocyte #: 1.17 K/uL (05-19-20 @ 07:08)  Auto Neutrophil #: 6.99 K/uL (05-18-20 @ 21:21)  Auto Lymphocyte #: 1.32 K/uL (05-18-20 @ 21:21)    Lactate, Blood: 1.4 mmol/L (05-18-20 @ 21:20)    Auto Eosinophil #: 0.17 K/uL (05-19-20 @ 07:08)  Auto Eosinophil #: 0.17 K/uL (05-18-20 @ 21:21)      Sedimentation Rate, Erythrocyte: 55 mm/hr (05-19-20 @ 07:08)  Sedimentation Rate, Erythrocyte: 60 mm/hr (05-18-20 @ 21:21)      INR: 1.10 ratio (05-18-20 @ 21:21)  Activated Partial Thromboplastin Time: 30.4 sec (05-18-20 @ 21:21)          MICROBIOLOGY:    Culture - Tissue with Gram Stain (05.20.20 @ 12:01)    Gram Stain:   No polymorphonuclear cells seen per low power field  No organisms seen per oil power field    -  Ampicillin/Sulbactam: S <=8/4    -  Cefazolin: S <=4    -  RIF- Rifampin: S <=1 Should not be used as monotherapy    -  Oxacillin: S 0.5    -  Penicillin: R >8    -  Gentamicin: S <=1 Should not be used as monotherapy    -  Clindamycin: S 0.5    -  Erythromycin: S <=0.25    -  Trimethoprim/Sulfamethoxazole: S <=0.5/9.5    -  Vancomycin: S 2    -  Tetra/Doxy: S <=1    Specimen Source: .Tissue Other, left forefoot clean marginbone    Culture Results:   Rare Staphylococcus aureus  Normal skin jakub isolated    Organism Identification: Staphylococcus aureus    Organism: Staphylococcus aureus    Method Type: BAYLEE        RADIOLOGY & ADDITIONAL STUDIES:    Surgical Pathology Report (05.20.20 @ 09:00)    Surgical Pathology Report:   ACCESSION No:  [id number]    SARAH MORRISON 2        Surgical Final Report          Final Diagnosis  1. Forefoot, left, transmetatarsal amputation:  Acute necrotizing inflammation of skin and subcutaneous  tissue extending to the        soft tissue surgical margin.  Acute osteomyelitis.  Acute osteomyelitis present at the bone margin of digit #3.  Acute and chronic osteomyelitis involves the fragments of  bone received separate    from the forefoot.    2. Bone, forefoot, left, clean margin:  Viable bone.    Verified by: Pamela Bartlett M.D.  (Electronic Signature)  Reported on: 05/22/20 10:56 EDT, [facility], [address] [place] [address], [place] [address]  Phone: [phone]   Fax: [phone]  _________________________________________________________________    Clinical History  Left foot osteomyelitis  Procedure: Left foot transmetatarsal amputation with achilles  tenotomy    Specimen(s) Submitted  1-Left forefoot  2-Left forefoot clean margin bone    Gross Description  1. The specimen is received in formalin and the specimen  container is labeled: Left forefoot. It consists of a  transmetatarsal amputation of the left foot with smooth skin and  bone margins measuring 8.5 x 6.2 x 6.0 cm and including digits  #2, #3,#4 and #5. The great toe was previously amputated. The  plantar surface shows a dark gray-brown ulcerated lesion  measuring 3.7 x 3.5 cm and extending to the skin margin. The  lateral surface of digit #2 shows a tan-gray gangrenous area  measuring 2.5 x 0.7 cm and the medial surface of digit #5 shows a  tan-gray ulcerated area measuring 1.7 x 0.5 cm. Also noted is a  1.0 x 0.8 cm tan-gray ulcerated lesion located at the interface  of digit #3 and digit #4. The bone cut surfaceis tan-brown, soft  and hemorrhagic. The nails are present and shows brown-yellow  discoloration. Separately received are four pieces of bone with  smooth articular surfaces adjoined by soft tissue measuring in  aggregate 8.0 x 2.7 x 1.5 cm. In addition, there are multiple  irregular pieces of              SARAH MORRISON                     2        Surgical Final Report          non-viable tissue with a variegated appearance measuring in  aggregate 4.5 x 4.0 x 1.2 cm. The specimen is representatively  submitted following selective decalcification. Nine cassettes.  A,B - ulcer and skin margin  C - bone margin, digit #3  D - bone overlying ulcerated lesion  E-H - separately received bone  I - separately received soft tissue    2. The specimen is received in formalin and the specimen  container is labeled: Left forefoot clean margin bone. It  consists of an ovoid piece of tan-brown hemorrhagic bony tissue  with a smooth clean surgical cut measuring 1.5 x 0.7 x 0.3 cm.  Entirely submittedfollowing decalcification. One cassette.    In addition to other data that may appear on the specimen  containers, all labels have been inspected to confirm the  presence of the patient's name and date of birth.  MELISSA Crane ([facility]) 5/21/2020 3:20PM    Perioperative Diagnosis  Left foot osteomyelitis    Postoperative Diagnosis  Same

## 2020-05-22 NOTE — PROGRESS NOTE ADULT - ASSESSMENT
48 yo male with PMHx significant of CAD s/p BMS to RCA (8/2019), RUL subsegmental PE (6/2019, on Eliquis), hx of NSTEMI and PAF s/p ex-lap omentopexy and plication for perforated antral ulcer (5/2019), osteomyelitis s/p debridement, patient had X ray soft tissue air and bony erosion around the distal fifth metatarsal highly concerning for osteomyelitis.   Plan is for left toe amputation CVA s/p tPA (8/2019), HTN and DM2 (not on insulin)     Foot OM  - s/p left foot transmetatarsal amputation  - Pain control  - Antibiotics vascular podiatry follow up     Afib Hx of PE  - EKG showed NSR.  Off tele but no evidence of bradycardia or tachycardia per flow sheet  - Please resume Eliquis when okay with podiatry   - Continue with Lopressor BID with hold parameters  - Echo 12/24 as delineated above revealing severe LVH, normal systolic function ef 65% grade 2 diastolic dysfunction, mild MR, trace TR   - Monitor electrolytes, replete to keep K.4 and Mag>2    CAD s/p PCI  - No symptoms suggestive of ischemia  - Continue Plavix 75 QD   - Continue Statin  - Continue Imdur  - Continue ARB and beta blocker     DM  - Management as per primary    DVT ppx  - Per primary    Will continue to follow  All other care per Primary and Podia    Henrietta Kim DNP, NP-C  Cardiology   Spectra #[phone]

## 2020-05-22 NOTE — PROGRESS NOTE ADULT - SUBJECTIVE AND OBJECTIVE BOX
[facility] -- Bolivar Carbajal, Brittany Gallegos, Reynaldo Sweeney Savella, [place]  Office # [phone]    Follow Up:  Preop Postop Optimization    Subjective/Observations: Awake and alert, on NC, though, off.  Denies any respiratory or cardiac discomfort.  c/o left foot pain    REVIEW OF SYSTEMS: All other review of systems is negative unless indicated above  PAST MEDICAL & SURGICAL HISTORY:  Cerebrovascular accident  CAD S/P percutaneous coronary angioplasty  Osteomyelitis: s/p debridement  History of non-ST elevation myocardial infarction (NSTEMI)  Pulmonary embolism  Perforated gastric ulcer: s/p emergent ex-lap omentopexy and plication 6/2019  BPH (benign prostatic hyperplasia)  Hypertension  Afib  Diabetes mellitus with no complication  Diabetes  Traumatic amputation of left foot, initial encounter  Perforated gastric ulcer  H/O abdominal surgery    MEDICATIONS  (STANDING):  atorvastatin 80 milliGRAM(s) Oral at bedtime  bethanechol 25 milliGRAM(s) Oral three times a day  clopidogrel Tablet 75 milliGRAM(s) Oral daily  dextrose 5%. 1000 milliLiter(s) (50 mL/Hr) IV Continuous <Continuous>  dextrose 50% Injectable 12.5 Gram(s) IV Push once  dextrose 50% Injectable 25 Gram(s) IV Push once  dextrose 50% Injectable 25 Gram(s) IV Push once  insulin glargine Injectable (LANTUS) 15 Unit(s) SubCutaneous at bedtime  insulin lispro (HumaLOG) corrective regimen sliding scale   SubCutaneous three times a day before meals  insulin lispro (HumaLOG) corrective regimen sliding scale   SubCutaneous at bedtime  insulin lispro Injectable (HumaLOG) 4 Unit(s) SubCutaneous three times a day before meals  isosorbide   mononitrate ER Tablet (IMDUR) 30 milliGRAM(s) Oral daily  losartan 25 milliGRAM(s) Oral daily  methimazole 10 milliGRAM(s) Oral daily  metoprolol tartrate 25 milliGRAM(s) Oral two times a day  pantoprazole    Tablet 40 milliGRAM(s) Oral before breakfast  piperacillin/tazobactam IVPB.. 3.375 Gram(s) IV Intermittent every 8 hours  vancomycin  IVPB 1250 milliGRAM(s) IV Intermittent every 12 hours    MEDICATIONS  (PRN):  acetaminophen   Tablet .. 650 milliGRAM(s) Oral every 6 hours PRN Mild Pain (1 - 3)  dextrose 40% Gel 15 Gram(s) Oral once PRN Blood Glucose LESS THAN 70 milliGRAM(s)/deciliter  glucagon  Injectable 1 milliGRAM(s) IntraMuscular once PRN Glucose LESS THAN 70 milligrams/deciliter  morphine  - Injectable 2 milliGRAM(s) IV Push every 4 hours PRN Severe Pain (7 - 10)  oxyCODONE    IR 5 milliGRAM(s) Oral every 6 hours PRN Severe Pain (7 - 10)  traMADol 25 milliGRAM(s) Oral every 6 hours PRN Moderate Pain (4 - 6)    Allergies    fish (Hives)  No Known Drug Allergies    Intolerances    Vital Signs Last 24 Hrs  T(C): 37.1 (22 May 2020 04:40), Max: 37.4 (21 May 2020 21:29)  T(F): 98.8 (22 May 2020 04:40), Max: 99.3 (21 May 2020 21:29)  HR: 68 (22 May 2020 04:40) (68 - 76)  BP: 112/57 (22 May 2020 04:40) (106/66 - 123/62)  BP(mean): --  RR: 19 (22 May 2020 04:40) (18 - 19)  SpO2: 90% (22 May 2020 04:40) (90% - 94%)  I&O's Summary    21 May 2020 07:01  -  22 May 2020 07:00  --------------------------------------------------------  IN: 600 mL / OUT: 1750 mL / NET: -1150 mL    PHYSICAL EXAM:  TELE: Not on tele  Constitutional: NAD, awake and alert, obese  HEENT: Moist Mucous Membranes, Anicteric  Pulmonary: Non-labored, breath sounds are clear bilaterally, No wheezing, rales or rhonchi  Cardiovascular: Regular, S1 and S2, No murmurs, rubs, gallops or clicks  Gastrointestinal: Bowel Sounds present, soft, nontender.   Lymph: No peripheral edema. No lymphadenopathy.  Skin: No visible rashes.  Left foot dressing dry and intact; Right plantar ulcer and right leg blister  Psych:  Mood & affect appropriate    LABS: All Labs Reviewed:                        8.6    12.35 )-----------( 351      ( 21 May 2020 14:24 )             27.4     21 May 2020 14:24    137    |  104    |  14     ----------------------------<  244    3.9     |  27     |  1.00     Ca    8.2        21 May 2020 14:24    < from: TTE Echo Doppler w/o Cont (12.24.19 @ 13:55) >     EXAM:  ECHO TTE WO CON COMP W DOPPLR         PROCEDURE DATE:  12/24/2019        INTERPRETATION:  INDICATION: Coronary artery disease    Blood Pressure 117/71    Height 187     Weight 93       BSA 2.2    Dimensions:    LA 4.2       Normal Values: 2.0 - 4.0 cm    Ao 4.0        Normal Values: 2.0 - 3.8 cm  SEPTUM 1.6       Normal Values: 0.6 - 1.2 cm  PWT 1.6       Normal Values: 0.6 - 1.1 cm  LVIDd 5.8         Normal Values: 3.0 - 5.6 cm  LVIDs 3.2         Normal Values: 1.8 - 4.0 cm    Derived Variables:  LVMI g/m2  RWT  Fractional Short  Ejection Fraction    Doppler Peak v. AoV= (m/sec)    OBSERVATIONS:    Mitral Valve: normal, mild MR.  Aortic Valve/Aorta: normal trileaflet aortic valve.  Tricuspid Valve: normal with trace TR.  Pulmonic Valve: normal  Left Atrium: Enlarged  Right Atrium: normal  Left Ventricle: Severe concentric LVH with normal systolic function, estimated LVEF of 65%.  Right Ventricle: normal size and systolic function.  Pericardium/Pleura: no significant pleural effusion, no significant pericardial effusion.  Pulmonary/RV Pressure: Inadequate TR jet to estimate PA systolic pressure  LV Diastolic Function: Grade 2diastolic dysfunction.    Severe concentric LVH, and mild LV enlargement, with normal systolic function, estimated LVEF of 65%. Normal right ventricular size and systolic function. Grade 2diastolic dysfunction. Left atrial enlargement The aortic root is normal in size. The mitral valve is structurally normal, mild MR. The aortic valve is trileaflet without stenosis or insufficiency. Trace physiologic TR. Inadequate TR jet to estimate PA systolic pressure No significant pericardial effusion.      JOVANNI ALVAREZ M.D., ATTENDING CARDIOLOGIST  This document has been electronically signed. Dec 25 2019 11:45AM      < end of copied text >    < from: Xray Chest 1 View AP/PA (05.18.20 @ 19:34) >    EXAM:  XR CHEST AP OR PA 1V                            PROCEDURE DATE:  05/18/2020          INTERPRETATION:    AP semierect chest on May 18, 2020 at 7:19 PM.    Patient is admitted with infection of the left foot likely osteomyelitis. Patient has history of bradycardia, leg edema, atrial fibrillation, anemia, gastric ulcer, type 2 diabetes, hypertension, and TIA.    Heart is magnified by technique.    Chronic appearing blunting of the left base laterally again noted.    Chest is similar to December 23, 2019.    IMPRESSION: Unchanged chest as above.    DISCRETE X-RAY DATA:  Percent of LEFT lung opacification: 1-33%  Percent of RIGHT lung opacification: Clear  Change in lung opacification from most recent x-ray (<=3 days): No Prior  Change from prior dated 3 or more days (same admission): No Prior    ABHISHEK ESPINO M.D., ATTENDING RADIOLOGIST  This document has been electronically signed. May 18 2020  7:39PM      < end of copied text >    < from: 12 Lead ECG (05.20.20 @ 12:29) >    Ventricular Rate 66 BPM    Atrial Rate 66 BPM    P-R Interval 128 ms    QRS Duration 82 ms    Q-T Interval 404 ms    QTC Calculation(Bezet) 423 ms    P Axis 35 degrees    R Axis 10 degrees    T Axis 32 degrees    Diagnosis Line Normal sinus rhythm  Cannot rule out Inferior infarct , age undetermined  Abnormal ECG  When compared with ECG of 23-DEC-2019 15:36,  Nonspecific T wave abnormality, improved in Inferior leads  T wave amplitude has increased in Anterior leads  Confirmed by DAT CALLAHAN ([id number]) on 5/20/2020 6:06:29 PM    < end of copied text >

## 2020-05-22 NOTE — PROGRESS NOTE ADULT - ASSESSMENT
49 male w PMH sig for DM<, CADneuropathy, adm with purulent left foot highly concerning for osteomyelitis.    Percutaneous tenotomy of Achilles tendon with general anesthesia 20-May-2020 10:08:08  Parviz Todd  Transmetatarsal amputation 20-May-2020 10:07:55  Parviz Todd.    path:  2. Bone, forefoot, left, clean margin:  Viable bone.

## 2020-05-22 NOTE — PHYSICAL THERAPY INITIAL EVALUATION ADULT - ADDITIONAL COMMENTS
Pt lives with friends in a house with steps to enter and steps to pt's bedroom. Pt stating he was independent with RW and independent with ADLs.

## 2020-05-22 NOTE — PROGRESS NOTE ADULT - SUBJECTIVE AND OBJECTIVE BOX
49y year old Male seen at [facility] 2[address] 240 W1 s/p left foot transmetatarsal amputation with achilles tenotomy (DOS: 5/20/20). Patient relates no overnight events and states that they are doing well at this time. Denies any fever, chills, nausea, vomiting, chest pain, shortness of breath, or calf pain at this time.    Allergies  fish (Hives)  No Known Drug Allergies    MEDICATIONS  (STANDING):  atorvastatin 80 milliGRAM(s) Oral at bedtime  bethanechol 25 milliGRAM(s) Oral three times a day  clopidogrel Tablet 75 milliGRAM(s) Oral daily  dextrose 5%. 1000 milliLiter(s) (50 mL/Hr) IV Continuous <Continuous>  dextrose 50% Injectable 12.5 Gram(s) IV Push once  dextrose 50% Injectable 25 Gram(s) IV Push once  dextrose 50% Injectable 25 Gram(s) IV Push once  insulin glargine Injectable (LANTUS) 15 Unit(s) SubCutaneous at bedtime  insulin lispro (HumaLOG) corrective regimen sliding scale   SubCutaneous three times a day before meals  insulin lispro (HumaLOG) corrective regimen sliding scale   SubCutaneous at bedtime  insulin lispro Injectable (HumaLOG) 4 Unit(s) SubCutaneous three times a day before meals  isosorbide   mononitrate ER Tablet (IMDUR) 30 milliGRAM(s) Oral daily  losartan 25 milliGRAM(s) Oral daily  methimazole 10 milliGRAM(s) Oral daily  metoprolol tartrate 25 milliGRAM(s) Oral two times a day  pantoprazole    Tablet 40 milliGRAM(s) Oral before breakfast  piperacillin/tazobactam IVPB.. 3.375 Gram(s) IV Intermittent every 8 hours  vancomycin  IVPB 1250 milliGRAM(s) IV Intermittent every 12 hours    MEDICATIONS  (PRN):  acetaminophen   Tablet .. 650 milliGRAM(s) Oral every 6 hours PRN Mild Pain (1 - 3)  dextrose 40% Gel 15 Gram(s) Oral once PRN Blood Glucose LESS THAN 70 milliGRAM(s)/deciliter  glucagon  Injectable 1 milliGRAM(s) IntraMuscular once PRN Glucose LESS THAN 70 milligrams/deciliter  morphine  - Injectable 2 milliGRAM(s) IV Push every 4 hours PRN Severe Pain (7 - 10)  oxyCODONE    IR 5 milliGRAM(s) Oral every 6 hours PRN Severe Pain (7 - 10)  traMADol 25 milliGRAM(s) Oral every 6 hours PRN Moderate Pain (4 - 6)    Vital Signs Last 24 Hrs  T(C): 37.1 (22 May 2020 04:40), Max: 37.4 (21 May 2020 21:29)  T(F): 98.8 (22 May 2020 04:40), Max: 99.3 (21 May 2020 21:29)  HR: 68 (22 May 2020 04:40) (68 - 76)  BP: 112/57 (22 May 2020 04:40) (106/66 - 123/62)  BP(mean): --  RR: 19 (22 May 2020 04:40) (18 - 19)  SpO2: 90% (22 May 2020 04:40) (90% - 94%)    PHYSICAL EXAM:  Vascular: DP & PT nonpalpable bilaterally, Capillary refill 5 seconds  Neurological: Light touch sensation not intact bilaterally  Musculoskeletal: 5/5 strength in all quadrants bilaterally, AJ & STJ ROM intact, s/p left 1st partial ray amputation and 5th metatarsal head amputation  Dermatological: Incision sites of the left foot TMA site and achilles tenotomy site noted with intact sutures, no dehiscence, mild winston-incision erythema, no proximal streaking, no fluctuance, no malodor, no signs of infection at this time.      CBC Full  -  ( 21 May 2020 14:24 )  WBC Count : 12.35 K/uL  RBC Count : 3.11 M/uL  Hemoglobin : 8.6 g/dL  Hematocrit : 27.4 %  Platelet Count - Automated : 351 K/uL  Mean Cell Volume : 88.1 fl  Mean Cell Hemoglobin : 27.7 pg  Mean Cell Hemoglobin Concentration : 31.4 gm/dL  Auto Neutrophil # : x  Auto Lymphocyte # : x  Auto Monocyte # : x  Auto Eosinophil # : x  Auto Basophil # : x  Auto Neutrophil % : x  Auto Lymphocyte % : x  Auto Monocyte % : x  Auto Eosinophil % : x  Auto Basophil % : x    05-21    137  |  104  |  14  ----------------------------<  244<H>  3.9   |  27  |  1.00    Ca    8.2<L>      21 May 2020 14:24      Culture - Tissue with Gram Stain (collected 20 May 2020 12:01)  Source: .Tissue Other, left forefoot clean marginbone  Gram Stain (20 May 2020 13:54):    No polymorphonuclear cells seen per low power field    No organisms seen per oil power field  Preliminary Report (22 May 2020 10:03):    Rare Staphylococcus aureus    Normal skin jakub isolated  Organism: Staphylococcus aureus (22 May 2020 09:53)  Organism: Staphylococcus aureus (22 May 2020 09:53)    Culture - Tissue with Gram Stain (collected 20 May 2020 12:00)  Source: .Tissue Other, left forefoot bone culture  Gram Stain (21 May 2020 11:38):    Upon re-evaluation of gram stain: Numerous Gram positive cocci in pairs,    chains and clusters    per oil power field    No polymorphonuclear cells seen per low power field    Previously reported as:    No organisms seen per oil power field  Preliminary Report (21 May 2020 11:32):    Moderate Mixed gram positive jakub

## 2020-05-22 NOTE — GOALS OF CARE CONVERSATION - ADVANCED CARE PLANNING - CONVERSATION DETAILS
met pt, has no hcp, educated on role of hcp,  pt has emergency : Josseline Jean Baptiste ( pt friend, he does not want her to be hcp), inquired second name in computer: Beata Miles, pt sister,( [phone]) he has not spoken to her , wants her name deleted from contact list.  informed pt, without a hcp, for any emergency, they would contact next of kin, which is his sister. HCP form & PC RN contact # given to pt, he will consider once home.

## 2020-05-22 NOTE — PROGRESS NOTE ADULT - SUBJECTIVE AND OBJECTIVE BOX
Patient is a 49y old  Male who presents with a chief complaint of left foot infection (21 May 2020 15:12)      INTERVAL /OVERNIGHT EVENTS: refusing neurontin    MEDICATIONS  (STANDING):  apixaban 5 milliGRAM(s) Oral every 12 hours  atorvastatin 80 milliGRAM(s) Oral at bedtime  bethanechol 25 milliGRAM(s) Oral three times a day  cefTRIAXone   IVPB 1000 milliGRAM(s) IV Intermittent every 24 hours  clopidogrel Tablet 75 milliGRAM(s) Oral daily  dextrose 5%. 1000 milliLiter(s) (50 mL/Hr) IV Continuous <Continuous>  dextrose 50% Injectable 12.5 Gram(s) IV Push once  dextrose 50% Injectable 25 Gram(s) IV Push once  dextrose 50% Injectable 25 Gram(s) IV Push once  insulin glargine Injectable (LANTUS) 15 Unit(s) SubCutaneous at bedtime  insulin lispro (HumaLOG) corrective regimen sliding scale   SubCutaneous three times a day before meals  insulin lispro (HumaLOG) corrective regimen sliding scale   SubCutaneous at bedtime  insulin lispro Injectable (HumaLOG) 4 Unit(s) SubCutaneous three times a day before meals  isosorbide   mononitrate ER Tablet (IMDUR) 30 milliGRAM(s) Oral daily  losartan 25 milliGRAM(s) Oral daily  methimazole 10 milliGRAM(s) Oral daily  metoprolol tartrate 25 milliGRAM(s) Oral two times a day  pantoprazole    Tablet 40 milliGRAM(s) Oral before breakfast    MEDICATIONS  (PRN):  acetaminophen   Tablet .. 650 milliGRAM(s) Oral every 6 hours PRN Mild Pain (1 - 3)  dextrose 40% Gel 15 Gram(s) Oral once PRN Blood Glucose LESS THAN 70 milliGRAM(s)/deciliter  glucagon  Injectable 1 milliGRAM(s) IntraMuscular once PRN Glucose LESS THAN 70 milligrams/deciliter  morphine  - Injectable 2 milliGRAM(s) IV Push every 4 hours PRN Severe Pain (7 - 10)  oxyCODONE    IR 5 milliGRAM(s) Oral every 6 hours PRN Severe Pain (7 - 10)  traMADol 25 milliGRAM(s) Oral every 6 hours PRN Moderate Pain (4 - 6)      Allergies    fish (Hives)  No Known Drug Allergies    Intolerances        REVIEW OF SYSTEMS:  CONSTITUTIONAL: No fever, weight loss, or fatigue  EYES: No eye pain, visual disturbances, or discharge  ENMT:  No difficulty hearing, tinnitus, vertigo; No sinus or throat pain  NECK: No pain or stiffness  RESPIRATORY: No cough, wheezing, chills or hemoptysis; No shortness of breath  CARDIOVASCULAR: No chest pain, palpitations, dizziness, or leg swelling  GASTROINTESTINAL: No abdominal or epigastric pain. No nausea, vomiting, or hematemesis; No diarrhea or constipation. No melena or hematochezia.  GENITOURINARY: No dysuria, frequency, hematuria, or incontinence  NEUROLOGICAL: No headaches, memory loss, loss of strength, numbness, or tremors  SKIN: No itching, burning, rashes, or lesions   LYMPH NODES: No enlarged glands  ENDOCRINE: No heat or cold intolerance; No hair loss; No polydipsia or polyuria  MUSCULOSKELETAL: No joint pain or swelling; No muscle, back, or extremity pain  PSYCHIATRIC: No depression, anxiety, mood swings, or difficulty sleeping  HEME/LYMPH: No easy bruising, or bleeding gums  ALLERGY AND IMMUNOLOGIC: No hives or eczema    Vital Signs Last 24 Hrs  T(C): 36.7 (22 May 2020 12:42), Max: 37.4 (21 May 2020 21:29)  T(F): 98.1 (22 May 2020 12:42), Max: 99.3 (21 May 2020 21:29)  HR: 71 (22 May 2020 12:42) (68 - 73)  BP: 125/71 (22 May 2020 12:42) (112/57 - 125/71)  BP(mean): --  RR: 17 (22 May 2020 12:42) (17 - 19)  SpO2: 95% (22 May 2020 12:42) (90% - 95%)    PHYSICAL EXAM:  GENERAL: NAD, well-groomed, well-developed  HEAD:  Atraumatic, Normocephalic  EYES: EOMI, PERRLA, conjunctiva and sclera clear  ENMT: No tonsillar erythema, exudates, or enlargement; Moist mucous membranes, Good dentition, No lesions  NECK: Supple, No JVD, Normal thyroid  NERVOUS SYSTEM:  Alert & Oriented X3, Good concentration; Motor Strength 5/5 B/L upper and lower extremities; DTRs 2+ intact and symmetric  CHEST/LUNG: Clear to auscultation bilaterally; No rales, rhonchi, wheezing, or rubs  HEART: Regular rate and rhythm; No murmurs, rubs, or gallops  ABDOMEN: Soft, Nontender, Nondistended; Bowel sounds present  EXTREMITIES: LLE in bandage  LYMPH: No lymphadenopathy noted  SKIN: No rashes or lesions    LABS:                        8.6    9.85  )-----------( 366      ( 22 May 2020 12:24 )             28.0       Ca    8.2        21 May 2020 14:24          CAPILLARY BLOOD GLUCOSE      POCT Blood Glucose.: 207 mg/dL (22 May 2020 16:40)  POCT Blood Glucose.: 359 mg/dL (22 May 2020 11:14)  POCT Blood Glucose.: 299 mg/dL (22 May 2020 07:21)  POCT Blood Glucose.: 299 mg/dL (21 May 2020 21:29)      RADIOLOGY & ADDITIONAL TESTS:    Notes Reviewed:  [x ] YES  [ ] NO    Care Discussed with Consultants/Other Providers [x ] YES  [ ] NO

## 2020-05-23 ENCOUNTER — TRANSCRIPTION ENCOUNTER (OUTPATIENT)
Age: 50
End: 2020-05-23

## 2020-05-23 VITALS
OXYGEN SATURATION: 96 % | HEART RATE: 63 BPM | SYSTOLIC BLOOD PRESSURE: 108 MMHG | TEMPERATURE: 98 F | RESPIRATION RATE: 18 BRPM | DIASTOLIC BLOOD PRESSURE: 64 MMHG

## 2020-05-23 DIAGNOSIS — E11.9 TYPE 2 DIABETES MELLITUS WITHOUT COMPLICATIONS: ICD-10-CM

## 2020-05-23 LAB
-  AMPICILLIN/SULBACTAM: SIGNIFICANT CHANGE UP
-  CEFAZOLIN: SIGNIFICANT CHANGE UP
-  CLINDAMYCIN: SIGNIFICANT CHANGE UP
-  ERYTHROMYCIN: SIGNIFICANT CHANGE UP
-  GENTAMICIN: SIGNIFICANT CHANGE UP
-  OXACILLIN: SIGNIFICANT CHANGE UP
-  PENICILLIN: SIGNIFICANT CHANGE UP
-  RIFAMPIN: SIGNIFICANT CHANGE UP
-  TETRACYCLINE: SIGNIFICANT CHANGE UP
-  TRIMETHOPRIM/SULFAMETHOXAZOLE: SIGNIFICANT CHANGE UP
-  VANCOMYCIN: SIGNIFICANT CHANGE UP
HCT VFR BLD CALC: 28.5 % — LOW (ref 39–50)
HCT VFR BLD CALC: 29.7 % — LOW (ref 39–50)
HGB BLD-MCNC: 8.7 G/DL — LOW (ref 13–17)
HGB BLD-MCNC: 9.1 G/DL — LOW (ref 13–17)
MCHC RBC-ENTMCNC: 27.2 PG — SIGNIFICANT CHANGE UP (ref 27–34)
MCHC RBC-ENTMCNC: 30.5 GM/DL — LOW (ref 32–36)
MCV RBC AUTO: 89.1 FL — SIGNIFICANT CHANGE UP (ref 80–100)
METHOD TYPE: SIGNIFICANT CHANGE UP
NRBC # BLD: 0 /100 WBCS — SIGNIFICANT CHANGE UP (ref 0–0)
PLATELET # BLD AUTO: 371 K/UL — SIGNIFICANT CHANGE UP (ref 150–400)
RBC # BLD: 3.2 M/UL — LOW (ref 4.2–5.8)
RBC # FLD: 15.5 % — HIGH (ref 10.3–14.5)
WBC # BLD: 7.57 K/UL — SIGNIFICANT CHANGE UP (ref 3.8–10.5)
WBC # FLD AUTO: 7.57 K/UL — SIGNIFICANT CHANGE UP (ref 3.8–10.5)

## 2020-05-23 PROCEDURE — 73630 X-RAY EXAM OF FOOT: CPT

## 2020-05-23 PROCEDURE — 85014 HEMATOCRIT: CPT

## 2020-05-23 PROCEDURE — 88311 DECALCIFY TISSUE: CPT

## 2020-05-23 PROCEDURE — 83605 ASSAY OF LACTIC ACID: CPT

## 2020-05-23 PROCEDURE — 87186 SC STD MICRODIL/AGAR DIL: CPT

## 2020-05-23 PROCEDURE — 86140 C-REACTIVE PROTEIN: CPT

## 2020-05-23 PROCEDURE — 85027 COMPLETE CBC AUTOMATED: CPT

## 2020-05-23 PROCEDURE — 86901 BLOOD TYPING SEROLOGIC RH(D): CPT

## 2020-05-23 PROCEDURE — 71045 X-RAY EXAM CHEST 1 VIEW: CPT

## 2020-05-23 PROCEDURE — 99285 EMERGENCY DEPT VISIT HI MDM: CPT | Mod: 25

## 2020-05-23 PROCEDURE — 85610 PROTHROMBIN TIME: CPT

## 2020-05-23 PROCEDURE — 84443 ASSAY THYROID STIM HORMONE: CPT

## 2020-05-23 PROCEDURE — 87040 BLOOD CULTURE FOR BACTERIA: CPT

## 2020-05-23 PROCEDURE — 93971 EXTREMITY STUDY: CPT

## 2020-05-23 PROCEDURE — 83735 ASSAY OF MAGNESIUM: CPT

## 2020-05-23 PROCEDURE — 87635 SARS-COV-2 COVID-19 AMP PRB: CPT

## 2020-05-23 PROCEDURE — 86900 BLOOD TYPING SEROLOGIC ABO: CPT

## 2020-05-23 PROCEDURE — 87070 CULTURE OTHR SPECIMN AEROBIC: CPT

## 2020-05-23 PROCEDURE — 88304 TISSUE EXAM BY PATHOLOGIST: CPT

## 2020-05-23 PROCEDURE — 85652 RBC SED RATE AUTOMATED: CPT

## 2020-05-23 PROCEDURE — 84439 ASSAY OF FREE THYROXINE: CPT

## 2020-05-23 PROCEDURE — 97161 PT EVAL LOW COMPLEX 20 MIN: CPT

## 2020-05-23 PROCEDURE — 81001 URINALYSIS AUTO W/SCOPE: CPT

## 2020-05-23 PROCEDURE — 93926 LOWER EXTREMITY STUDY: CPT

## 2020-05-23 PROCEDURE — 36415 COLL VENOUS BLD VENIPUNCTURE: CPT

## 2020-05-23 PROCEDURE — 87086 URINE CULTURE/COLONY COUNT: CPT

## 2020-05-23 PROCEDURE — 86850 RBC ANTIBODY SCREEN: CPT

## 2020-05-23 PROCEDURE — 93923 UPR/LXTR ART STDY 3+ LVLS: CPT

## 2020-05-23 PROCEDURE — 85018 HEMOGLOBIN: CPT

## 2020-05-23 PROCEDURE — 93005 ELECTROCARDIOGRAM TRACING: CPT

## 2020-05-23 PROCEDURE — 84481 FREE ASSAY (FT-3): CPT

## 2020-05-23 PROCEDURE — 80202 ASSAY OF VANCOMYCIN: CPT

## 2020-05-23 PROCEDURE — 73590 X-RAY EXAM OF LOWER LEG: CPT

## 2020-05-23 PROCEDURE — 83036 HEMOGLOBIN GLYCOSYLATED A1C: CPT

## 2020-05-23 PROCEDURE — 80053 COMPREHEN METABOLIC PANEL: CPT

## 2020-05-23 PROCEDURE — 96365 THER/PROPH/DIAG IV INF INIT: CPT

## 2020-05-23 PROCEDURE — 96375 TX/PRO/DX INJ NEW DRUG ADDON: CPT

## 2020-05-23 PROCEDURE — 85730 THROMBOPLASTIN TIME PARTIAL: CPT

## 2020-05-23 PROCEDURE — 80048 BASIC METABOLIC PNL TOTAL CA: CPT

## 2020-05-23 PROCEDURE — 99232 SBSQ HOSP IP/OBS MODERATE 35: CPT

## 2020-05-23 PROCEDURE — 82962 GLUCOSE BLOOD TEST: CPT

## 2020-05-23 PROCEDURE — 87075 CULTR BACTERIA EXCEPT BLOOD: CPT

## 2020-05-23 RX ORDER — INSULIN LISPRO 100/ML
6 VIAL (ML) SUBCUTANEOUS
Refills: 0 | Status: DISCONTINUED | OUTPATIENT
Start: 2020-05-23 | End: 2020-05-23

## 2020-05-23 RX ORDER — INSULIN GLARGINE 100 [IU]/ML
20 INJECTION, SOLUTION SUBCUTANEOUS AT BEDTIME
Refills: 0 | Status: DISCONTINUED | OUTPATIENT
Start: 2020-05-23 | End: 2020-05-23

## 2020-05-23 RX ORDER — TRAMADOL HYDROCHLORIDE 50 MG/1
0.5 TABLET ORAL
Qty: 0 | Refills: 0 | DISCHARGE
Start: 2020-05-23

## 2020-05-23 RX ORDER — OXYCODONE HYDROCHLORIDE 5 MG/1
1 TABLET ORAL
Qty: 0 | Refills: 0 | DISCHARGE
Start: 2020-05-23

## 2020-05-23 RX ORDER — ACETAMINOPHEN 500 MG
2 TABLET ORAL
Qty: 0 | Refills: 0 | DISCHARGE
Start: 2020-05-23

## 2020-05-23 RX ADMIN — CLOPIDOGREL BISULFATE 75 MILLIGRAM(S): 75 TABLET, FILM COATED ORAL at 11:51

## 2020-05-23 RX ADMIN — Medication 25 MILLIGRAM(S): at 04:51

## 2020-05-23 RX ADMIN — Medication 25 MILLIGRAM(S): at 13:09

## 2020-05-23 RX ADMIN — LOSARTAN POTASSIUM 25 MILLIGRAM(S): 100 TABLET, FILM COATED ORAL at 04:51

## 2020-05-23 RX ADMIN — Medication 4 UNIT(S): at 07:54

## 2020-05-23 RX ADMIN — CEFTRIAXONE 100 MILLIGRAM(S): 500 INJECTION, POWDER, FOR SOLUTION INTRAMUSCULAR; INTRAVENOUS at 11:51

## 2020-05-23 RX ADMIN — MORPHINE SULFATE 2 MILLIGRAM(S): 50 CAPSULE, EXTENDED RELEASE ORAL at 05:01

## 2020-05-23 RX ADMIN — APIXABAN 5 MILLIGRAM(S): 2.5 TABLET, FILM COATED ORAL at 04:51

## 2020-05-23 RX ADMIN — Medication 6: at 11:50

## 2020-05-23 RX ADMIN — MORPHINE SULFATE 2 MILLIGRAM(S): 50 CAPSULE, EXTENDED RELEASE ORAL at 11:51

## 2020-05-23 RX ADMIN — Medication 4: at 07:54

## 2020-05-23 RX ADMIN — Medication 6 UNIT(S): at 11:50

## 2020-05-23 RX ADMIN — PANTOPRAZOLE SODIUM 40 MILLIGRAM(S): 20 TABLET, DELAYED RELEASE ORAL at 04:51

## 2020-05-23 RX ADMIN — ISOSORBIDE MONONITRATE 30 MILLIGRAM(S): 60 TABLET, EXTENDED RELEASE ORAL at 11:51

## 2020-05-23 RX ADMIN — MORPHINE SULFATE 2 MILLIGRAM(S): 50 CAPSULE, EXTENDED RELEASE ORAL at 00:15

## 2020-05-23 NOTE — PROGRESS NOTE ADULT - PROBLEM SELECTOR PLAN 1
[FreeTextEntry1] : PAUL TERAN                       1\par Surgical Final Report\par Clinical Information\par 495.05\par Final Diagnosis\par Lipoma, right inguinal, excision\par - Mature adipose tissue, consistent with lipoma.\par Ivy Goss  (Electronic Signature)\par Reported on: 12/31/15\par 
cont lantus 15 units qhs  add humalog 4 units 3x/day before meals  cont humalog scale coverage
increase lantus 20 units qhs  increase humalog 6 units 3x/day before meals  cont mod dose humalog scale coverage qac/qhs  cont cons cho diet  goal bg 100-180 in hosp setting
Continue with Rajeev and Glenn.   ID consult. Podiatry consult called, follow up recs.  Follow up cultures.   Trend down ESR and CRP.   Pain control.
Continue with Vanc and Zosyn.   ID consult with Dr. Whitten. Podiatry consult with Dr. Geoffrey bello  Follow up cultures and path neg margins  Trend down ESR and CRP.   Pain control.
Continue with Vanc and Zosyn.   ID consult with Dr. Whitten. Podiatry consult with Dr. Hale,   Follow up cultures and path  Trend down ESR and CRP.   Pain control.
Patient examined and evaluated.  Surgical site dressed with adaptic, and dry, sterile dressing.  Patient is to be partial weight bearing to the heel of the surgical lower extremity as tolerated in a surgical shoe.  Surgical pathology and cultures pending at this time.  Antibiotics as per ID recommendations.
Patient examined and evaluated.  Surgical site dressed with surgicel, adaptic, and dry, sterile dressing.  Patient is to be partial weight bearing to the heel of the surgical lower extremity as tolerated in a surgical shoe.  Surgical pathology and cultures pending at this time.  Antibiotics as per ID recommendations.
with clean margins and MSSA will give last dose of ceftriasone today and then fine to finish on Keflex 500mg PO QID with last day May 29th  From an ID standpoint no further requirement for inpatient status for the management of ID issues. Fine with discharge from ID standpoint when other medical issues no longer require inpatient care and social issues allow for a safe discharge plan.  Thank you for consulting us and involving us in the management of this most interesting and challenging case.     Please Call with any further questions
with micro rec continuing current abx and then anticipate further recs tomorrow

## 2020-05-23 NOTE — DISCHARGE NOTE PROVIDER - NSDCCPCAREPLAN_GEN_ALL_CORE_FT
PRINCIPAL DISCHARGE DIAGNOSIS  Diagnosis: Cellulitis of left foot  Assessment and Plan of Treatment: follow up with podiatry Dr. Hale

## 2020-05-23 NOTE — PROGRESS NOTE ADULT - ATTENDING COMMENTS
Chart reviewed    Patient seen and examined    Agree with plan as outlined above
I personally saw and examined the patient in detail.  I have spoken to the above provider regarding the assessment and plan of care.  I reviewed the above assessment and plan of care, and agree.  I have made changes in the body of the note where appropriate.
I personally saw and examined the patient in detail.  I have spoken to the above provider regarding the assessment and plan of care.  I reviewed the above assessment and plan of care, and agree.  I have made changes in the body of the note where appropriate.
I saw and examined the patient personally. Spoke with above provider regarding this case. I reviewed the above findings completely.  I agree with the above history, physical, and plan which I have edited where appropriate.

## 2020-05-23 NOTE — PROGRESS NOTE ADULT - PROBLEM SELECTOR PROBLEM 1
Diabetes
Diabetes mellitus with no complication
Acute osteomyelitis

## 2020-05-23 NOTE — PROGRESS NOTE ADULT - SUBJECTIVE AND OBJECTIVE BOX
[facility] -- Bolivar Carbajal Grossman, [place], Reynaldo Sweeney Savella, Goodger  Office # [phone]    Follow Up:  Preop Postop Optimization    Subjective/Observations: c/o left foot pain with relief from pain med.  NC in use but denies any form of respiratory discomfort.  No c/o chest pain or palpitations    REVIEW OF SYSTEMS: All other review of systems is negative unless indicated above  PAST MEDICAL & SURGICAL HISTORY:  Cerebrovascular accident  CAD S/P percutaneous coronary angioplasty  Osteomyelitis: s/p debridement  History of non-ST elevation myocardial infarction (NSTEMI)  Pulmonary embolism  Perforated gastric ulcer: s/p emergent ex-lap omentopexy and plication 6/2019  BPH (benign prostatic hyperplasia)  Hypertension  Afib  Diabetes mellitus with no complication  Diabetes  Traumatic amputation of left foot, initial encounter  Perforated gastric ulcer  H/O abdominal surgery    MEDICATIONS  (STANDING):  apixaban 5 milliGRAM(s) Oral every 12 hours  atorvastatin 80 milliGRAM(s) Oral at bedtime  bethanechol 25 milliGRAM(s) Oral three times a day  cefTRIAXone   IVPB 1000 milliGRAM(s) IV Intermittent every 24 hours  clopidogrel Tablet 75 milliGRAM(s) Oral daily  dextrose 5%. 1000 milliLiter(s) (50 mL/Hr) IV Continuous <Continuous>  dextrose 50% Injectable 12.5 Gram(s) IV Push once  dextrose 50% Injectable 25 Gram(s) IV Push once  dextrose 50% Injectable 25 Gram(s) IV Push once  insulin glargine Injectable (LANTUS) 20 Unit(s) SubCutaneous at bedtime  insulin lispro (HumaLOG) corrective regimen sliding scale   SubCutaneous three times a day before meals  insulin lispro (HumaLOG) corrective regimen sliding scale   SubCutaneous at bedtime  insulin lispro Injectable (HumaLOG) 6 Unit(s) SubCutaneous three times a day before meals  isosorbide   mononitrate ER Tablet (IMDUR) 30 milliGRAM(s) Oral daily  losartan 25 milliGRAM(s) Oral daily  methimazole 10 milliGRAM(s) Oral daily  metoprolol tartrate 25 milliGRAM(s) Oral two times a day  pantoprazole    Tablet 40 milliGRAM(s) Oral before breakfast    MEDICATIONS  (PRN):  acetaminophen   Tablet .. 650 milliGRAM(s) Oral every 6 hours PRN Mild Pain (1 - 3)  dextrose 40% Gel 15 Gram(s) Oral once PRN Blood Glucose LESS THAN 70 milliGRAM(s)/deciliter  glucagon  Injectable 1 milliGRAM(s) IntraMuscular once PRN Glucose LESS THAN 70 milligrams/deciliter  morphine  - Injectable 2 milliGRAM(s) IV Push every 4 hours PRN Severe Pain (7 - 10)  oxyCODONE    IR 5 milliGRAM(s) Oral every 6 hours PRN Severe Pain (7 - 10)  traMADol 25 milliGRAM(s) Oral every 6 hours PRN Moderate Pain (4 - 6)    Allergies    fish (Hives)  No Known Drug Allergies    Intolerances    Vital Signs Last 24 Hrs  T(C): 36.9 (23 May 2020 04:49), Max: 36.9 (22 May 2020 22:01)  T(F): 98.5 (23 May 2020 04:49), Max: 98.5 (23 May 2020 04:49)  HR: 68 (23 May 2020 04:49) (64 - 71)  BP: 126/67 (23 May 2020 04:49) (112/62 - 126/67)  BP(mean): --  RR: 17 (23 May 2020 04:49) (17 - 17)  SpO2: 94% (23 May 2020 04:49) (94% - 98%)  I&O's Summary    22 May 2020 07:01  -  23 May 2020 07:00  --------------------------------------------------------  IN: 460 mL / OUT: 4600 mL / NET: -4140 mL    PHYSICAL EXAM:  TELE: Not on tele  Constitutional: NAD, awake and alert, obese  HEENT: Moist Mucous Membranes, Anicteric  Pulmonary: Non-labored, breath sounds are clear bilaterally, No wheezing, rales or rhonchi  Cardiovascular: Regular, S1 and S2, No murmurs, rubs, gallops or clicks  Gastrointestinal: Bowel Sounds present, soft, nontender.   Lymph: No peripheral edema. No lymphadenopathy.  Skin: No visible rashes.  Left foot dressing dry and intact; Right plantar ulcer and right leg blister  Psych:  Mood & affect appropriate      LABS: All Labs Reviewed:                        8.7    7.57  )-----------( 371      ( 23 May 2020 05:39 )             28.5                         8.3    x     )-----------( x        ( 22 May 2020 16:39 )             27.0                         8.6    9.85  )-----------( 366      ( 22 May 2020 12:24 )             28.0     21 May 2020 14:24    137    |  104    |  14     ----------------------------<  244    3.9     |  27     |  1.00     Ca    8.2        21 May 2020 14:24    < from: TTE Echo Doppler w/o Cont (12.24.19 @ 13:55) >     EXAM:  ECHO TTE WO CON COMP W DOPPLR         PROCEDURE DATE:  12/24/2019        INTERPRETATION:  INDICATION: Coronary artery disease    Blood Pressure 117/71    Height 187     Weight 93       BSA 2.2    Dimensions:    LA 4.2       Normal Values: 2.0 - 4.0 cm    Ao 4.0        Normal Values: 2.0 - 3.8 cm  SEPTUM 1.6       Normal Values: 0.6 - 1.2 cm  PWT 1.6       Normal Values: 0.6 - 1.1 cm  LVIDd 5.8         Normal Values: 3.0 - 5.6 cm  LVIDs 3.2         Normal Values: 1.8 - 4.0 cm    Derived Variables:  LVMI g/m2  RWT  Fractional Short  Ejection Fraction    Doppler Peak v. AoV= (m/sec)    OBSERVATIONS:    Mitral Valve: normal, mild MR.  Aortic Valve/Aorta: normal trileaflet aortic valve.  Tricuspid Valve: normal with trace TR.  Pulmonic Valve: normal  Left Atrium: Enlarged  Right Atrium: normal  Left Ventricle: Severe concentric LVH with normal systolic function, estimated LVEF of 65%.  Right Ventricle: normal size and systolic function.  Pericardium/Pleura: no significant pleural effusion, no significant pericardial effusion.  Pulmonary/RV Pressure: Inadequate TR jet to estimate PA systolic pressure  LV Diastolic Function: Grade 2diastolic dysfunction.    Severe concentric LVH, and mild LV enlargement, with normal systolic function, estimated LVEF of 65%. Normal right ventricular size and systolic function. Grade 2diastolic dysfunction. Left atrial enlargement The aortic root is normal in size. The mitral valve is structurally normal, mild MR. The aortic valve is trileaflet without stenosis or insufficiency. Trace physiologic TR. Inadequate TR jet to estimate PA systolic pressure No significant pericardial effusion.      JOVANNI ALVAREZ M.D., ATTENDING CARDIOLOGIST  This document has been electronically signed. Dec 25 2019 11:45AM      < end of copied text >    < from: Xray Chest 1 View AP/PA (05.18.20 @ 19:34) >    EXAM:  XR CHEST AP OR PA 1V                            PROCEDURE DATE:  05/18/2020          INTERPRETATION:    AP semierect chest on May 18, 2020 at 7:19 PM.    Patient is admitted with infection of the left foot likely osteomyelitis. Patient has history of bradycardia, leg edema, atrial fibrillation, anemia, gastric ulcer, type 2 diabetes, hypertension, and TIA.    Heart is magnified by technique.    Chronic appearing blunting of the left base laterally again noted.    Chest is similar to December 23, 2019.    IMPRESSION: Unchanged chest as above.    DISCRETE X-RAY DATA:  Percent of LEFT lung opacification: 1-33%  Percent of RIGHT lung opacification: Clear  Change in lung opacification from most recent x-ray (<=3 days): No Prior  Change from prior dated 3 or more days (same admission): No Prior    ABHISHEK ESPINO M.D., ATTENDING RADIOLOGIST  This document has been electronically signed. May 18 2020  7:39PM      < end of copied text >    < from: 12 Lead ECG (05.20.20 @ 12:29) >    Ventricular Rate 66 BPM    Atrial Rate 66 BPM    P-R Interval 128 ms    QRS Duration 82 ms    Q-T Interval 404 ms    QTC Calculation(Bezet) 423 ms    P Axis 35 degrees    R Axis 10 degrees    T Axis 32 degrees    Diagnosis Line Normal sinus rhythm  Cannot rule out Inferior infarct , age undetermined  Abnormal ECG  When compared with ECG of 23-DEC-2019 15:36,  Nonspecific T wave abnormality, improved in Inferior leads  T wave amplitude has increased in Anterior leads  Confirmed by DAT CALLAHAN ([id number]) on 5/20/2020 6:06:29 PM    < end of copied text >

## 2020-05-23 NOTE — DISCHARGE NOTE PROVIDER - CARE PROVIDER_API CALL
Jimbo Hale  PODIATRIC MEDICINE AND SURGERY  [address] [place] [address]  Phone: [phone]  Fax: [phone]  Follow Up Time:     Grisel Murray  INTERNAL MEDICINE  [address] [place] [address]  Phone: [phone]  Fax: [phone]  Follow Up Time:     Juventino Whitten  INFECTIOUS DISEASE  [address] [place] [address]  Phone: [phone]  Fax: [phone]  Follow Up Time:

## 2020-05-23 NOTE — PROGRESS NOTE ADULT - PROBLEM SELECTOR PROBLEM 2
Hyperthyroidism
Hyperthyroidism
Diabetes mellitus with no complication

## 2020-05-23 NOTE — PROGRESS NOTE ADULT - PROBLEM SELECTOR PLAN 2
cont methimazole 10mg daily
cont methimazole 10mg daily  check tfts
Barbie with HISS.   Follow up A1c.  Endo consult called with Dr. perlman
Barbie with HISS.   Follow up A1c.  Endo consult called with Dr. perlman appreciated
Barbie with HISS.   Follow up A1c.  Endo consult called with Dr. perlman appreciated

## 2020-05-23 NOTE — CHART NOTE - NSCHARTNOTEFT_GEN_A_CORE
Do you have Advance Directives (HCP / LV / Organ donation / Documentation of oral advance Directive):   (  x  )  yes    (      )    NO                                                                            Do you have LV - Living will :                                                                                                                                             (    )  yes    (    x  )   No    Do you have HCP - Health Care Proxy:                                                                                                                            (  x   )  yes   (       ) N0    Do you have DNR- Do Not Resuscitate :                                                                                                                           (      )  yes  (    x    )  No    Do you have DNI- Do Not intubate  :                                                                                                                               (      )  yes   (   x    ) No    Do you have MOLST - Medical orders for Life sustaining treatment  :                                                                    (      ) yes    (    x   ) No    Decision Maker :  (   x  ) Patient     (      )  HCA   (     ) Public Health Law Surrogate     (      ) Surrogate  (       ) Guardian    Goals of Care :  (    x  )   Complete Care     (       ) No Limitations                              (       )   Comfort Care       (       )  Hospice                               (      )   Limited medical Intervention / s    Medical Interventions :   (  x      )   CPR       (        )  DNR                                               (     x   )  Intubation with MV - Mechanical Ventilation  (  x    ) BIPAP/CPAP    (         )   DNI                                               (     x    )  Artificial Nutrition -  IVF, TPN / PPN, Tube Feeds             (         )   No Feeding Tube                                                (    x    ) Use Antibiotics                         (          ) No Antibiotics                                                (    x     ) Blood and Blood Products     (         )   No Blood or Blood products                                                (     x     )  Dialysis                                    (         )  No Dialysis                                                (          )  Medical Management only  (         )  No Invasive Interventions or Surgery  Time spent :                        (    x   ) up to 30 minutes                       (           )   more than 30 minutes  GOC reviewed and discussed

## 2020-05-23 NOTE — DISCHARGE NOTE PROVIDER - HOSPITAL COURSE
admitted for left lower leg / foot infection    abx per id    found to have osteomyelitis    underwent transmetatarsal amputation    post op anemia    PT for ambulation    DC after podiatry and ID clearance

## 2020-05-23 NOTE — DISCHARGE NOTE PROVIDER - NSDCMRMEDTOKEN_GEN_ALL_CORE_FT
acetaminophen 325 mg oral tablet: 2 tab(s) orally every 6 hours, As needed, Mild Pain (1 - 3)  Admelog SoloStar 100 units/mL injectable solution: 7 unit(s) subcutaneous 3 times a day (with meals)   E11.9  alcohol swabs : Apply topically to affected area 4 times a day   apixaban 5 mg oral tablet: 1 tab(s) orally 2 times a day  atorvastatin 80 mg oral tablet: 1 tab(s) orally once a day (at bedtime)  home/hospital  Basaglar KwikPen 100 units/mL subcutaneous solution: 10 unit(s) subcutaneous once a day (morning)  bethanechol 25 mg oral tablet: 1 tab(s) orally 3 times a day  clopidogrel 75 mg oral tablet: 1 tab(s) orally once a day  glucometer (per patient&#x27;s insurance): Test blood sugars four times a day. Dispense #1 glucometer.  E11.9  Insulin Pen Needles, 4mm: 1 application subcutaneously 4 times a day. ** Use with insulin pen **   E11.9  isosorbide mononitrate 30 mg oral tablet, extended release: 1 tab(s) orally once a day (in the morning)  lancets: 1 application subcutaneously 4 times a day   E11.9  Lantus Solostar Pen 100 units/mL subcutaneous solution: 20 unit(s) subcutaneous in the morning and at bedtime   E11.9  losartan 25 mg oral tablet: 1 tab(s) orally once a day, hold for SBP &lt; 100  metFORMIN 1000 mg oral tablet: 1 tab(s) orally every 12 hours   methIMAzole 10 mg oral tablet: 1 tab(s) orally once a day  metoprolol tartrate 25 mg oral tablet: 1 tab(s) orally 2 times a day  omeprazole 40 mg oral delayed release capsule: 1 cap(s) orally once a day  oxyCODONE 5 mg oral tablet: 1 tab(s) orally every 6 hours, As needed, Severe Pain (7 - 10)  predniSONE 5 mg oral tablet: 1 tab(s) orally once a day  sucralfate 1 g oral tablet: 1 tab(s) orally 4 times a day  tamsulosin 0.4 mg oral capsule: 1 cap(s) orally once a day (at bedtime)  home  test strips (per patient&#x27;s insurance): 1 application subcutaneously 4 times a day. ** Compatible with patient&#x27;s glucometer **  E11.9  traMADol 50 mg oral tablet: 0.5 tab(s) orally every 6 hours, As needed, Moderate Pain (4 - 6)

## 2020-05-23 NOTE — DISCHARGE NOTE PROVIDER - PROVIDER TOKENS
PROVIDER:[TOKEN:[[id number]]],PROVIDER:[TOKEN:[[id number]]],PROVIDER:[TOKEN:[[id number]]]

## 2020-05-23 NOTE — PROGRESS NOTE ADULT - PROBLEM/PLAN-2
Detail Level: Detailed
DISPLAY PLAN FREE TEXT

## 2020-05-23 NOTE — DISCHARGE NOTE PROVIDER - CARE PROVIDERS DIRECT ADDRESSES
[email or web address]

## 2020-05-23 NOTE — DISCHARGE NOTE NURSING/CASE MANAGEMENT/SOCIAL WORK - PATIENT PORTAL LINK FT
You can access the FollowMyHealth Patient Portal offered by [facility] by registering at the following website: [email or web address]. By joining [facility]’s FollowMyHealth portal, you will also be able to view your health information using other applications (apps) compatible with our system.

## 2020-05-23 NOTE — PROGRESS NOTE ADULT - ASSESSMENT
50 yo male with PMHx significant of CAD s/p BMS to RCA (8/2019), RUL subsegmental PE (6/2019, on Eliquis), hx of NSTEMI and PAF s/p ex-lap omentopexy and plication for perforated antral ulcer (5/2019), osteomyelitis s/p debridement, patient had X ray soft tissue air and bony erosion around the distal fifth metatarsal highly concerning for osteomyelitis.   Plan is for left toe amputation CVA s/p tPA (8/2019), HTN and DM2 (not on insulin)     Foot OM  - s/p left foot transmetatarsal amputation.  Remains medically stable  - Pain control  - Antibiotics vascular podiatry follow up     Afib Hx of PE  - EKG showed NSR.  No bradycardia or tachycardia per flow sheet  - Back on Eliquis  - Continue Lopressor BID with hold parameters  - Echo 12/24 as delineated above revealing severe LVH, normal systolic function ef 65% grade 2 diastolic dysfunction, mild MR, trace TR   - Monitor electrolytes, replete to keep K.4 and Mag>2    CAD s/p PCI  - No symptoms suggestive of ischemia  - Continue Plavix 75 QD   - Continue Statin  - Continue Imdur  - Continue ARB and beta blocker     DM  - Management as per primary    DVT ppx  - Per primary    Will continue to follow  All other care per Primary and Podia    Henrietta Kim DNP, NP-C  Cardiology   Spectra #[phone]

## 2020-05-23 NOTE — PROGRESS NOTE ADULT - SUBJECTIVE AND OBJECTIVE BOX
CAPILLARY BLOOD GLUCOSE      POCT Blood Glucose.: 224 mg/dL (23 May 2020 07:28)  POCT Blood Glucose.: 224 mg/dL (22 May 2020 21:00)  POCT Blood Glucose.: 207 mg/dL (22 May 2020 16:40)  POCT Blood Glucose.: 359 mg/dL (22 May 2020 11:14)      Vital Signs Last 24 Hrs  T(C): 36.9 (23 May 2020 04:49), Max: 36.9 (22 May 2020 22:01)  T(F): 98.5 (23 May 2020 04:49), Max: 98.5 (23 May 2020 04:49)  HR: 68 (23 May 2020 04:49) (64 - 71)  BP: 126/67 (23 May 2020 04:49) (112/62 - 126/67)  BP(mean): --  RR: 17 (23 May 2020 04:49) (17 - 17)  SpO2: 94% (23 May 2020 04:49) (94% - 98%)      Respiratory: CTA B/L  CV: RRR, S1S2, no murmurs, rubs or gallops  Abdominal: Soft, NT, ND +BS, Last BM  Extremities: le foot amp     05-21    137  |  104  |  14  ----------------------------<  244<H>  3.9   |  27  |  1.00    Ca    8.2<L>      21 May 2020 14:24        atorvastatin 80 milliGRAM(s) Oral at bedtime  dextrose 40% Gel 15 Gram(s) Oral once PRN  dextrose 50% Injectable 12.5 Gram(s) IV Push once  dextrose 50% Injectable 25 Gram(s) IV Push once  dextrose 50% Injectable 25 Gram(s) IV Push once  glucagon  Injectable 1 milliGRAM(s) IntraMuscular once PRN  insulin glargine Injectable (LANTUS) 15 Unit(s) SubCutaneous at bedtime  insulin lispro (HumaLOG) corrective regimen sliding scale   SubCutaneous three times a day before meals  insulin lispro (HumaLOG) corrective regimen sliding scale   SubCutaneous at bedtime  insulin lispro Injectable (HumaLOG) 4 Unit(s) SubCutaneous three times a day before meals  methimazole 10 milliGRAM(s) Oral daily

## 2020-05-24 LAB
CULTURE RESULTS: SIGNIFICANT CHANGE UP
CULTURE RESULTS: SIGNIFICANT CHANGE UP
SPECIMEN SOURCE: SIGNIFICANT CHANGE UP
SPECIMEN SOURCE: SIGNIFICANT CHANGE UP

## 2020-05-25 LAB
CULTURE RESULTS: SIGNIFICANT CHANGE UP
CULTURE RESULTS: SIGNIFICANT CHANGE UP
ORGANISM # SPEC MICROSCOPIC CNT: SIGNIFICANT CHANGE UP
SPECIMEN SOURCE: SIGNIFICANT CHANGE UP
SPECIMEN SOURCE: SIGNIFICANT CHANGE UP

## 2020-06-10 ENCOUNTER — APPOINTMENT (OUTPATIENT)
Dept: PODIATRY | Facility: HOSPITAL | Age: 50
End: 2020-06-10

## 2020-06-10 ENCOUNTER — OUTPATIENT (OUTPATIENT)
Dept: OUTPATIENT SERVICES | Facility: HOSPITAL | Age: 50
LOS: 1 days | Discharge: HOME | End: 2020-06-10
Payer: COMMERCIAL

## 2020-06-10 ENCOUNTER — APPOINTMENT (OUTPATIENT)
Dept: WOUND CARE | Facility: HOSPITAL | Age: 50
End: 2020-06-10
Payer: MEDICAID

## 2020-06-10 VITALS
TEMPERATURE: 98.9 F | HEIGHT: 74 IN | WEIGHT: 215 LBS | RESPIRATION RATE: 20 BRPM | SYSTOLIC BLOOD PRESSURE: 109 MMHG | OXYGEN SATURATION: 98 % | HEART RATE: 67 BPM | DIASTOLIC BLOOD PRESSURE: 61 MMHG | BODY MASS INDEX: 27.59 KG/M2

## 2020-06-10 DIAGNOSIS — Z86.73 PERSONAL HISTORY OF TRANSIENT ISCHEMIC ATTACK (TIA), AND CEREBRAL INFARCTION W/OUT RESIDUAL DEFICITS: ICD-10-CM

## 2020-06-10 DIAGNOSIS — T87.81 DEHISCENCE OF AMPUTATION STUMP: ICD-10-CM

## 2020-06-10 DIAGNOSIS — I10 ESSENTIAL (PRIMARY) HYPERTENSION: ICD-10-CM

## 2020-06-10 DIAGNOSIS — Z87.19 PERSONAL HISTORY OF OTHER DISEASES OF THE DIGESTIVE SYSTEM: ICD-10-CM

## 2020-06-10 DIAGNOSIS — I26.99 OTHER PULMONARY EMBOLISM W/OUT ACUTE COR PULMONALE: ICD-10-CM

## 2020-06-10 DIAGNOSIS — Z86.39 PERSONAL HISTORY OF OTHER ENDOCRINE, NUTRITIONAL AND METABOLIC DISEASE: ICD-10-CM

## 2020-06-10 DIAGNOSIS — K25.5 CHRONIC OR UNSPECIFIED GASTRIC ULCER WITH PERFORATION: ICD-10-CM

## 2020-06-10 DIAGNOSIS — Z87.891 PERSONAL HISTORY OF NICOTINE DEPENDENCE: ICD-10-CM

## 2020-06-10 DIAGNOSIS — K21.9 GASTRO-ESOPHAGEAL REFLUX DISEASE W/OUT ESOPHAGITIS: ICD-10-CM

## 2020-06-10 DIAGNOSIS — I21.4 NON-ST ELEVATION (NSTEMI) MYOCARDIAL INFARCTION: ICD-10-CM

## 2020-06-10 DIAGNOSIS — G47.00 INSOMNIA, UNSPECIFIED: ICD-10-CM

## 2020-06-10 DIAGNOSIS — Z86.69 PERSONAL HISTORY OF OTHER DISEASES OF THE NERVOUS SYSTEM AND SENSE ORGANS: ICD-10-CM

## 2020-06-10 DIAGNOSIS — Z98.890 OTHER SPECIFIED POSTPROCEDURAL STATES: Chronic | ICD-10-CM

## 2020-06-10 DIAGNOSIS — Z87.440 PERSONAL HISTORY OF URINARY (TRACT) INFECTIONS: ICD-10-CM

## 2020-06-10 DIAGNOSIS — E56.9 VITAMIN DEFICIENCY, UNSPECIFIED: ICD-10-CM

## 2020-06-10 DIAGNOSIS — I48.91 UNSPECIFIED ATRIAL FIBRILLATION: ICD-10-CM

## 2020-06-10 DIAGNOSIS — Z78.9 OTHER SPECIFIED HEALTH STATUS: ICD-10-CM

## 2020-06-10 DIAGNOSIS — S98.912A COMPLETE TRAUMATIC AMPUTATION OF LEFT FOOT, LEVEL UNSPECIFIED, INITIAL ENCOUNTER: Chronic | ICD-10-CM

## 2020-06-10 DIAGNOSIS — K25.5 CHRONIC OR UNSPECIFIED GASTRIC ULCER WITH PERFORATION: Chronic | ICD-10-CM

## 2020-06-10 DIAGNOSIS — I25.10 ATHEROSCLEROTIC HEART DISEASE OF NATIVE CORONARY ARTERY W/OUT ANGINA PECTORIS: ICD-10-CM

## 2020-06-10 DIAGNOSIS — E78.5 HYPERLIPIDEMIA, UNSPECIFIED: ICD-10-CM

## 2020-06-10 DIAGNOSIS — G46.0: ICD-10-CM

## 2020-06-10 DIAGNOSIS — H60.90 UNSPECIFIED OTITIS EXTERNA, UNSPECIFIED EAR: ICD-10-CM

## 2020-06-10 DIAGNOSIS — Z09 ENCOUNTER FOR FOLLOW-UP EXAMINATION AFTER COMPLETED TREATMENT FOR CONDITIONS OTHER THAN MALIGNANT NEOPLASM: ICD-10-CM

## 2020-06-10 DIAGNOSIS — N40.1 BENIGN PROSTATIC HYPERPLASIA WITH LOWER URINARY TRACT SYMPMS: ICD-10-CM

## 2020-06-10 DIAGNOSIS — I25.2 OLD MYOCARDIAL INFARCTION: ICD-10-CM

## 2020-06-10 DIAGNOSIS — Z86.2 PERSONAL HISTORY OF DISEASES OF THE BLOOD AND BLOOD-FORMING ORGANS AND CERTAIN DISORDERS INVOLVING THE IMMUNE MECHANISM: ICD-10-CM

## 2020-06-10 DIAGNOSIS — E11.40 TYPE 2 DIABETES MELLITUS WITH DIABETIC NEUROPATHY, UNSPECIFIED: ICD-10-CM

## 2020-06-10 DIAGNOSIS — Z87.898 PERSONAL HISTORY OF OTHER SPECIFIED CONDITIONS: ICD-10-CM

## 2020-06-10 DIAGNOSIS — Z87.39 PERSONAL HISTORY OF OTHER DISEASES OF THE MUSCULOSKELETAL SYSTEM AND CONNECTIVE TISSUE: ICD-10-CM

## 2020-06-10 PROCEDURE — 82962 GLUCOSE BLOOD TEST: CPT

## 2020-06-10 PROCEDURE — 12020 TX SUPFC WND DEHSN SMPL CLSR: CPT | Mod: 78

## 2020-06-10 PROCEDURE — 12020 TX SUPFC WND DEHSN SMPL CLSR: CPT

## 2020-06-10 PROCEDURE — G0463: CPT | Mod: 25

## 2020-06-10 NOTE — PROCEDURE
[de-identified] : left foot dfu3 with incision site dehiscence at distal stump with bleeding [FreeTextEntry9] : 1253hr [FreeTextEntry6] : left foot dfu3 with incision site dehiscence at distal stump with bleeding [de-identified] : harlan [de-identified] : pedro [FreeTextEntry7] : left foot dfu3 with incision site dehiscence at distal stump with bleeding [de-identified] : 10mL of 0.5% marcaine plain [de-identified] : 50mL

## 2020-06-10 NOTE — REASON FOR VISIT
[FreeTextEntry6] : dehiscence of left foot surgical site [FreeTextEntry4] : Pt presents for delayed primary closure. Pt seen s/p left foot transmetatarsal amputation with achilles tenotomy (DOS: 5/20/20) seen in the wound care center for follow up. Pt relates that he had bleeding from the surgical site and went to [facility] where he had a transfusion and was discharged. Pt relates that while he has tried not to put pressure on the surgical site, it has been difficult not to do so. Denies other complaints at this time. [FreeTextEntry7] : dehiscence of left foot surgical site

## 2020-06-10 NOTE — PHYSICAL EXAM
[4 x 4] : 4 x 4  [Abdominal Pad] : Abdominal Pad [1+] : right 1+ [Ankle Swelling (On Exam)] : present [Ankle Swelling On The Left] : moderate [Skin Ulcer] : ulcer [Varicose Veins Of Lower Extremities] : not present [JVD] : no jugular venous distention  [de-identified] : calm [] : not present [de-identified] : diminished light touch sensation [de-identified] : s/p left TMA and achilles tenotomy [de-identified] : left foot dfu3 down to skin, subcutaneous tissue, fat, fascia, bone, with incision site dehiscence at distal stump with bleeding noted [FreeTextEntry3] : 4.5 [FreeTextEntry1] : Left foot TMA site medial - Some sutures in place. Iodoform packing in place.  [FreeTextEntry4] : 4 [FreeTextEntry2] : 8.5 [de-identified] : sanguinous [de-identified] : Delayed primary closure  [de-identified] : Cleansed with NS\par Kerlix  [de-identified] : Expressed comfort post ACE application. [de-identified] : MD placed surgicel and nhan  [de-identified] : Original sutures removed from TMA, new sutures placed. Large amounts of controlled bleeding during procedure.  Patient tolerated well. [FreeTextEntry7] : Left foot TMA site lateral - Some sutures in place, Iodoform packing in place [de-identified] : 4 [FreeTextEntry8] : 1.5 [FreeTextEntry9] : 1 [de-identified] : sanguinous [FreeTextEntry6] : D [de-identified] : Delayed primary closure  [de-identified] : Expressed comfort post ACE application. [de-identified] : Cleansed with NS\par Kerlix  [de-identified] : MD placed Surgicel and nhan  [de-identified] : Normal [TWNoteComboBox4] : Large [de-identified] : None [de-identified] : None [de-identified] : No [de-identified] : 50% [de-identified] : Ace wraps [de-identified] : 1% Lidocaine Injection [de-identified] : Other [de-identified] : Large [de-identified] : Normal [de-identified] : None [de-identified] : None [de-identified] : 1% Lidocaine Injection [de-identified] : 50% [de-identified] : No [de-identified] : Ace wraps [de-identified] : Other

## 2020-06-10 NOTE — ASSESSMENT
[Written] : Written [Verbal] : Verbal [Demo] : Demo [Handout] : Handout [Patient] : Patient [Home Health Provider] : Home Health Provider [Good - alert, interested, motivated] : Good - alert, interested, motivated [Verbalizes knowledge/Understanding] : Verbalizes knowledge/understanding [Dressing changes] : dressing changes [Foot Care] : foot care [Nutrition] : nutrition [Signs and symptoms of infection] : sign and symptoms of infection [How and When to Call] : how and when to call [Pain Management] : pain management [Off-loading] : off-loading [Hyperbaric Therapy] : hyperbaric therapy [Home Health] : home health [Patient responsibility to plan of care] : patient responsibility to plan of care [] : Yes [Glycemic Control] : glycemic control [Stable] : stable [LTC] : LTC [Wheelchair] : Wheelchair [Not Applicable - Long Term Care/Home Health Agency] : Long Term Care/Home Health Agency: Not Applicable [FreeTextEntry2] : Infection prevention\par Localized wound care \par Hyperbaric oxygen therapy \par Acceptable pain tolerance levels deemed to be 3/10.\par Goal of remaining pain free regarding wounds.\par Offloading \par  [FreeTextEntry4] : Auth submitted for same day delayed primary closure\par Auth submitted for HBO\par Patient given strict instructions to offload.\par Patient to follow up tomorrow and will need to complete the rest of his HBO orientation

## 2020-06-10 NOTE — VITALS
[Pain related to present condition?] : The patient's  pain is related to present condition. [Throbbing] : throbbing [Occasional] : occasional [] : Yes [FreeTextEntry3] : Left foot TMA site  [de-identified] : 3/10     Acceptable pain tolerance levels deemed to be 3/10. [FreeTextEntry4] : Thick padded dressing.  [FreeTextEntry2] : Pressure on area  [FreeTextEntry1] : Offloading

## 2020-06-10 NOTE — REVIEW OF SYSTEMS
[Skin Wound] : skin wound [Fever] : no fever [Eye Pain] : no eye pain [Chest Pain] : no chest pain [Earache] : no earache [Shortness Of Breath] : no shortness of breath [FreeTextEntry9] : s/p left foot TMA w/achilles tenotomy [Abdominal Pain] : no abdominal pain [de-identified] : diabetic neuropathy [de-identified] : left foot dfu3 with incision site dehiscence at distal stump with bleeding noted

## 2020-06-10 NOTE — PHYSICAL EXAM
[4 x 4] : 4 x 4  [Abdominal Pad] : Abdominal Pad [1+] : right 1+ [Ankle Swelling (On Exam)] : present [Ankle Swelling On The Left] : moderate [Skin Ulcer] : ulcer [Varicose Veins Of Lower Extremities] : not present [JVD] : no jugular venous distention  [de-identified] : calm [] : not present [de-identified] : diminished light touch sensation [de-identified] : s/p left TMA and achilles tenotomy [de-identified] : left foot dfu3 down to skin, subcutaneous tissue, fat, fascia, bone, with incision site dehiscence at distal stump with bleeding noted [FreeTextEntry1] : Left foot TMA site medial - Some sutures in place. Iodoform packing in place.  [FreeTextEntry4] : 4 [FreeTextEntry2] : 8.5 [FreeTextEntry3] : 4.5 [de-identified] : Delayed primary closure  [de-identified] : sanguinous [de-identified] : Expressed comfort post ACE application. [de-identified] : Cleansed with NS\par Kerlix  [de-identified] : MD placed surgicel and nhan  [FreeTextEntry7] : Left foot TMA site lateral - Some sutures in place, Iodoform packing in place [de-identified] : Original sutures removed from TMA, new sutures placed. Large amounts of controlled bleeding during procedure.  Patient tolerated well. [FreeTextEntry9] : 1 [de-identified] : 4 [FreeTextEntry8] : 1.5 [de-identified] : sanguinous [FreeTextEntry6] : D [de-identified] : Delayed primary closure  [de-identified] : Expressed comfort post ACE application. [de-identified] : Cleansed with NS\par Kerlix  [de-identified] : MD placed Surgicel and nhan  [de-identified] : Normal [TWNoteComboBox4] : Large [de-identified] : None [de-identified] : None [de-identified] : No [de-identified] : 50% [de-identified] : Ace wraps [de-identified] : 1% Lidocaine Injection [de-identified] : Normal [de-identified] : Other [de-identified] : Large [de-identified] : None [de-identified] : None [de-identified] : 1% Lidocaine Injection [de-identified] : 50% [de-identified] : No [de-identified] : Ace wraps [de-identified] : Other

## 2020-06-10 NOTE — REASON FOR VISIT
[FreeTextEntry4] : Pt presents for delayed primary closure. Pt seen s/p left foot transmetatarsal amputation with achilles tenotomy (DOS: 5/20/20) seen in the wound care center for follow up. Pt relates that he had bleeding from the surgical site and went to [facility] where he had a transfusion and was discharged. Pt relates that while he has tried not to put pressure on the surgical site, it has been difficult not to do so. Denies other complaints at this time. [FreeTextEntry6] : dehiscence of left foot surgical site [FreeTextEntry7] : dehiscence of left foot surgical site

## 2020-06-10 NOTE — ASSESSMENT
[Verbal] : Verbal [Written] : Written [Demo] : Demo [Patient] : Patient [Handout] : Handout [Home Health Provider] : Home Health Provider [Good - alert, interested, motivated] : Good - alert, interested, motivated [Verbalizes knowledge/Understanding] : Verbalizes knowledge/understanding [Foot Care] : foot care [Dressing changes] : dressing changes [Nutrition] : nutrition [Signs and symptoms of infection] : sign and symptoms of infection [How and When to Call] : how and when to call [Pain Management] : pain management [Off-loading] : off-loading [Hyperbaric Therapy] : hyperbaric therapy [Home Health] : home health [Patient responsibility to plan of care] : patient responsibility to plan of care [] : Yes [Glycemic Control] : glycemic control [Stable] : stable [LTC] : LTC [Not Applicable - Long Term Care/Home Health Agency] : Long Term Care/Home Health Agency: Not Applicable [Wheelchair] : Wheelchair [FreeTextEntry2] : Infection prevention\par Localized wound care \par Hyperbaric oxygen therapy \par Acceptable pain tolerance levels deemed to be 3/10.\par Goal of remaining pain free regarding wounds.\par Offloading \par  [FreeTextEntry4] : Auth submitted for same day delayed primary closure\par Auth submitted for HBO\par Patient given strict instructions to offload.\par Patient to follow up tomorrow and will need to complete the rest of his HBO orientation

## 2020-06-10 NOTE — PROCEDURE
[de-identified] : left foot dfu3 with incision site dehiscence at distal stump with bleeding [FreeTextEntry9] : 1253hr [de-identified] : pedro [de-identified] : harlan [FreeTextEntry6] : left foot dfu3 with incision site dehiscence at distal stump with bleeding [de-identified] : 10mL of 0.5% marcaine plain [FreeTextEntry7] : left foot dfu3 with incision site dehiscence at distal stump with bleeding [de-identified] : 50mL

## 2020-06-10 NOTE — VITALS
[Pain related to present condition?] : The patient's  pain is related to present condition. [Throbbing] : throbbing [Occasional] : occasional [] : Yes [FreeTextEntry3] : Left foot TMA site  [de-identified] : 3/10     Acceptable pain tolerance levels deemed to be 3/10. [FreeTextEntry1] : Offloading  [FreeTextEntry2] : Pressure on area  [FreeTextEntry4] : Thick padded dressing.

## 2020-06-10 NOTE — REVIEW OF SYSTEMS
[Skin Wound] : skin wound [Fever] : no fever [Eye Pain] : no eye pain [Chest Pain] : no chest pain [Earache] : no earache [Shortness Of Breath] : no shortness of breath [Abdominal Pain] : no abdominal pain [FreeTextEntry9] : s/p left foot TMA w/achilles tenotomy [de-identified] : left foot dfu3 with incision site dehiscence at distal stump with bleeding noted [de-identified] : diabetic neuropathy

## 2020-06-11 ENCOUNTER — APPOINTMENT (OUTPATIENT)
Dept: WOUND CARE | Facility: HOSPITAL | Age: 50
End: 2020-06-11

## 2020-06-11 DIAGNOSIS — E11.40 TYPE 2 DIABETES MELLITUS WITH DIABETIC NEUROPATHY, UNSPECIFIED: ICD-10-CM

## 2020-06-11 DIAGNOSIS — Z87.891 PERSONAL HISTORY OF NICOTINE DEPENDENCE: ICD-10-CM

## 2020-06-11 DIAGNOSIS — I25.2 OLD MYOCARDIAL INFARCTION: ICD-10-CM
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
## 2020-06-11 DIAGNOSIS — T87.81 DEHISCENCE OF AMPUTATION STUMP: ICD-10-CM

## 2020-06-11 DIAGNOSIS — D64.9 ANEMIA, UNSPECIFIED: ICD-10-CM

## 2020-06-11 DIAGNOSIS — K59.00 CONSTIPATION, UNSPECIFIED: ICD-10-CM

## 2020-06-11 DIAGNOSIS — Z87.440 PERSONAL HISTORY OF URINARY (TRACT) INFECTIONS: ICD-10-CM

## 2020-06-11 DIAGNOSIS — N40.1 BENIGN PROSTATIC HYPERPLASIA WITH LOWER URINARY TRACT SYMPTOMS: ICD-10-CM

## 2020-06-11 DIAGNOSIS — E87.6 HYPOKALEMIA: ICD-10-CM

## 2020-06-11 DIAGNOSIS — Z89.432 ACQUIRED ABSENCE OF LEFT FOOT: ICD-10-CM

## 2020-06-11 DIAGNOSIS — Z91.013 ALLERGY TO SEAFOOD: ICD-10-CM

## 2020-06-11 DIAGNOSIS — I69.351 HEMIPLEGIA AND HEMIPARESIS FOLLOWING CEREBRAL INFARCTION AFFECTING RIGHT DOMINANT SIDE: ICD-10-CM

## 2020-06-11 DIAGNOSIS — E56.9 VITAMIN DEFICIENCY, UNSPECIFIED: ICD-10-CM

## 2020-06-15 ENCOUNTER — TRANSCRIPTION ENCOUNTER (OUTPATIENT)
Age: 50
End: 2020-06-15

## 2020-06-16 ENCOUNTER — NON-APPOINTMENT (OUTPATIENT)
Age: 50
End: 2020-06-16

## 2020-06-16 ENCOUNTER — APPOINTMENT (OUTPATIENT)
Dept: PODIATRY | Facility: HOSPITAL | Age: 50
End: 2020-06-16

## 2020-06-16 ENCOUNTER — INPATIENT (INPATIENT)
Facility: HOSPITAL | Age: 50
LOS: 7 days | Discharge: NURSING, REHAB OR LONG-TERM CARE | DRG: 474 | End: 2020-06-24
Attending: FAMILY MEDICINE | Admitting: FAMILY MEDICINE
Payer: COMMERCIAL

## 2020-06-16 ENCOUNTER — OUTPATIENT (OUTPATIENT)
Dept: OUTPATIENT SERVICES | Facility: HOSPITAL | Age: 50
LOS: 1 days | Discharge: ANOTHER HOSPITAL | End: 2020-06-16
Payer: COMMERCIAL

## 2020-06-16 ENCOUNTER — RESULT REVIEW (OUTPATIENT)
Age: 50
End: 2020-06-16

## 2020-06-16 VITALS
OXYGEN SATURATION: 96 % | WEIGHT: 214.95 LBS | DIASTOLIC BLOOD PRESSURE: 67 MMHG | TEMPERATURE: 99 F | RESPIRATION RATE: 18 BRPM | SYSTOLIC BLOOD PRESSURE: 109 MMHG | HEART RATE: 79 BPM | HEIGHT: 74 IN

## 2020-06-16 VITALS
HEART RATE: 95 BPM | RESPIRATION RATE: 20 BRPM | WEIGHT: 215 LBS | TEMPERATURE: 98.4 F | DIASTOLIC BLOOD PRESSURE: 66 MMHG | BODY MASS INDEX: 27.59 KG/M2 | SYSTOLIC BLOOD PRESSURE: 122 MMHG | HEIGHT: 74 IN | OXYGEN SATURATION: 95 %

## 2020-06-16 DIAGNOSIS — L97.526 NON-PRESSURE CHRONIC ULCER OF OTHER PART OF LEFT FOOT WITH BONE INVOLVEMENT WITHOUT EVIDENCE OF NECROSIS: ICD-10-CM

## 2020-06-16 DIAGNOSIS — Z98.890 OTHER SPECIFIED POSTPROCEDURAL STATES: Chronic | ICD-10-CM

## 2020-06-16 DIAGNOSIS — L08.9 LOCAL INFECTION OF THE SKIN AND SUBCUTANEOUS TISSUE, UNSPECIFIED: ICD-10-CM

## 2020-06-16 DIAGNOSIS — K25.5 CHRONIC OR UNSPECIFIED GASTRIC ULCER WITH PERFORATION: Chronic | ICD-10-CM

## 2020-06-16 DIAGNOSIS — S98.912A COMPLETE TRAUMATIC AMPUTATION OF LEFT FOOT, LEVEL UNSPECIFIED, INITIAL ENCOUNTER: Chronic | ICD-10-CM

## 2020-06-16 DIAGNOSIS — E11.9 TYPE 2 DIABETES MELLITUS WITHOUT COMPLICATIONS: ICD-10-CM

## 2020-06-16 DIAGNOSIS — T87.81 DEHISCENCE OF AMPUTATION STUMP: ICD-10-CM

## 2020-06-16 DIAGNOSIS — I48.91 UNSPECIFIED ATRIAL FIBRILLATION: ICD-10-CM

## 2020-06-16 DIAGNOSIS — I10 ESSENTIAL (PRIMARY) HYPERTENSION: ICD-10-CM

## 2020-06-16 LAB
A1C WITH ESTIMATED AVERAGE GLUCOSE RESULT: 7.5 % — HIGH (ref 4–5.6)
ALBUMIN SERPL ELPH-MCNC: 2.3 G/DL — LOW (ref 3.3–5)
ALP SERPL-CCNC: 108 U/L — SIGNIFICANT CHANGE UP (ref 40–120)
ALT FLD-CCNC: 12 U/L — SIGNIFICANT CHANGE UP (ref 12–78)
ANION GAP SERPL CALC-SCNC: 6 MMOL/L — SIGNIFICANT CHANGE UP (ref 5–17)
APTT BLD: 34.9 SEC — SIGNIFICANT CHANGE UP (ref 28.5–37)
APTT BLD: 35.2 SEC — SIGNIFICANT CHANGE UP (ref 28.5–37)
AST SERPL-CCNC: 10 U/L — LOW (ref 15–37)
BASOPHILS # BLD AUTO: 0.04 K/UL — SIGNIFICANT CHANGE UP (ref 0–0.2)
BASOPHILS NFR BLD AUTO: 0.4 % — SIGNIFICANT CHANGE UP (ref 0–2)
BILIRUB SERPL-MCNC: 0.3 MG/DL — SIGNIFICANT CHANGE UP (ref 0.2–1.2)
BLD GP AB SCN SERPL QL: SIGNIFICANT CHANGE UP
BUN SERPL-MCNC: 11 MG/DL — SIGNIFICANT CHANGE UP (ref 7–23)
CALCIUM SERPL-MCNC: 8.7 MG/DL — SIGNIFICANT CHANGE UP (ref 8.5–10.1)
CHLORIDE SERPL-SCNC: 106 MMOL/L — SIGNIFICANT CHANGE UP (ref 96–108)
CO2 SERPL-SCNC: 27 MMOL/L — SIGNIFICANT CHANGE UP (ref 22–31)
CREAT SERPL-MCNC: 0.92 MG/DL — SIGNIFICANT CHANGE UP (ref 0.5–1.3)
EOSINOPHIL # BLD AUTO: 0.2 K/UL — SIGNIFICANT CHANGE UP (ref 0–0.5)
EOSINOPHIL NFR BLD AUTO: 2.2 % — SIGNIFICANT CHANGE UP (ref 0–6)
ERYTHROCYTE [SEDIMENTATION RATE] IN BLOOD: 42 MM/HR — HIGH (ref 0–20)
ESTIMATED AVERAGE GLUCOSE: 169 MG/DL — HIGH (ref 68–114)
GLUCOSE SERPL-MCNC: 124 MG/DL — HIGH (ref 70–99)
HCT VFR BLD CALC: 25.2 % — LOW (ref 39–50)
HCT VFR BLD CALC: 26.1 % — LOW (ref 39–50)
HCT VFR BLD CALC: 28.7 % — LOW (ref 39–50)
HGB BLD-MCNC: 7.9 G/DL — LOW (ref 13–17)
HGB BLD-MCNC: 7.9 G/DL — LOW (ref 13–17)
HGB BLD-MCNC: 8.5 G/DL — LOW (ref 13–17)
IMM GRANULOCYTES NFR BLD AUTO: 0.5 % — SIGNIFICANT CHANGE UP (ref 0–1.5)
INR BLD: 1.4 RATIO — HIGH (ref 0.88–1.16)
INR BLD: 1.55 RATIO — HIGH (ref 0.88–1.16)
LACTATE SERPL-SCNC: 3.7 MMOL/L — HIGH (ref 0.7–2)
LYMPHOCYTES # BLD AUTO: 1.11 K/UL — SIGNIFICANT CHANGE UP (ref 1–3.3)
LYMPHOCYTES # BLD AUTO: 12.1 % — LOW (ref 13–44)
MCHC RBC-ENTMCNC: 27 PG — SIGNIFICANT CHANGE UP (ref 27–34)
MCHC RBC-ENTMCNC: 27.6 PG — SIGNIFICANT CHANGE UP (ref 27–34)
MCHC RBC-ENTMCNC: 28.5 PG — SIGNIFICANT CHANGE UP (ref 27–34)
MCHC RBC-ENTMCNC: 29.6 GM/DL — LOW (ref 32–36)
MCHC RBC-ENTMCNC: 30.3 GM/DL — LOW (ref 32–36)
MCHC RBC-ENTMCNC: 31.3 GM/DL — LOW (ref 32–36)
MCV RBC AUTO: 91 FL — SIGNIFICANT CHANGE UP (ref 80–100)
MCV RBC AUTO: 91.1 FL — SIGNIFICANT CHANGE UP (ref 80–100)
MCV RBC AUTO: 91.3 FL — SIGNIFICANT CHANGE UP (ref 80–100)
MONOCYTES # BLD AUTO: 0.82 K/UL — SIGNIFICANT CHANGE UP (ref 0–0.9)
MONOCYTES NFR BLD AUTO: 9 % — SIGNIFICANT CHANGE UP (ref 2–14)
NEUTROPHILS # BLD AUTO: 6.92 K/UL — SIGNIFICANT CHANGE UP (ref 1.8–7.4)
NEUTROPHILS NFR BLD AUTO: 75.8 % — SIGNIFICANT CHANGE UP (ref 43–77)
NRBC # BLD: 0 /100 WBCS — SIGNIFICANT CHANGE UP (ref 0–0)
NT-PROBNP SERPL-SCNC: 873 PG/ML — HIGH (ref 0–125)
PLATELET # BLD AUTO: 371 K/UL — SIGNIFICANT CHANGE UP (ref 150–400)
PLATELET # BLD AUTO: 376 K/UL — SIGNIFICANT CHANGE UP (ref 150–400)
PLATELET # BLD AUTO: 388 K/UL — SIGNIFICANT CHANGE UP (ref 150–400)
POTASSIUM SERPL-MCNC: 4.3 MMOL/L — SIGNIFICANT CHANGE UP (ref 3.5–5.3)
POTASSIUM SERPL-SCNC: 4.3 MMOL/L — SIGNIFICANT CHANGE UP (ref 3.5–5.3)
PROT SERPL-MCNC: 6.5 G/DL — SIGNIFICANT CHANGE UP (ref 6–8.3)
PROTHROM AB SERPL-ACNC: 15.8 SEC — HIGH (ref 10–12.9)
PROTHROM AB SERPL-ACNC: 17.6 SEC — HIGH (ref 10–12.9)
RBC # BLD: 2.77 M/UL — LOW (ref 4.2–5.8)
RBC # BLD: 2.86 M/UL — LOW (ref 4.2–5.8)
RBC # BLD: 3.15 M/UL — LOW (ref 4.2–5.8)
RBC # FLD: 15.7 % — HIGH (ref 10.3–14.5)
RBC # FLD: 16 % — HIGH (ref 10.3–14.5)
RBC # FLD: 16 % — HIGH (ref 10.3–14.5)
SARS-COV-2 RNA SPEC QL NAA+PROBE: SIGNIFICANT CHANGE UP
SODIUM SERPL-SCNC: 139 MMOL/L — SIGNIFICANT CHANGE UP (ref 135–145)
WBC # BLD: 16.09 K/UL — HIGH (ref 3.8–10.5)
WBC # BLD: 17.61 K/UL — HIGH (ref 3.8–10.5)
WBC # BLD: 9.14 K/UL — SIGNIFICANT CHANGE UP (ref 3.8–10.5)
WBC # FLD AUTO: 16.09 K/UL — HIGH (ref 3.8–10.5)
WBC # FLD AUTO: 17.61 K/UL — HIGH (ref 3.8–10.5)
WBC # FLD AUTO: 9.14 K/UL — SIGNIFICANT CHANGE UP (ref 3.8–10.5)

## 2020-06-16 PROCEDURE — 99292 CRITICAL CARE ADDL 30 MIN: CPT

## 2020-06-16 PROCEDURE — 88311 DECALCIFY TISSUE: CPT | Mod: 26

## 2020-06-16 PROCEDURE — G0463: CPT

## 2020-06-16 PROCEDURE — 73630 X-RAY EXAM OF FOOT: CPT | Mod: 26,LT

## 2020-06-16 PROCEDURE — 28810 AMPUTATION TOE & METATARSAL: CPT | Mod: T5,78

## 2020-06-16 PROCEDURE — 71045 X-RAY EXAM CHEST 1 VIEW: CPT | Mod: 26

## 2020-06-16 PROCEDURE — 88304 TISSUE EXAM BY PATHOLOGIST: CPT | Mod: 26

## 2020-06-16 PROCEDURE — 99291 CRITICAL CARE FIRST HOUR: CPT

## 2020-06-16 PROCEDURE — 93010 ELECTROCARDIOGRAM REPORT: CPT

## 2020-06-16 PROCEDURE — 28140 REMOVAL OF METATARSAL: CPT | Mod: T3,78

## 2020-06-16 PROCEDURE — 99284 EMERGENCY DEPT VISIT MOD MDM: CPT

## 2020-06-16 RX ORDER — ISOSORBIDE MONONITRATE 60 MG/1
1 TABLET, EXTENDED RELEASE ORAL
Qty: 0 | Refills: 0 | DISCHARGE

## 2020-06-16 RX ORDER — METOPROLOL TARTRATE 50 MG
50 TABLET ORAL EVERY 12 HOURS
Refills: 0 | Status: DISCONTINUED | OUTPATIENT
Start: 2020-06-17 | End: 2020-06-24

## 2020-06-16 RX ORDER — BETHANECHOL CHLORIDE 25 MG
25 TABLET ORAL THREE TIMES A DAY
Refills: 0 | Status: DISCONTINUED | OUTPATIENT
Start: 2020-06-16 | End: 2020-06-24

## 2020-06-16 RX ORDER — CEFAZOLIN SODIUM 1 G
2000 VIAL (EA) INJECTION EVERY 8 HOURS
Refills: 0 | Status: DISCONTINUED | OUTPATIENT
Start: 2020-06-16 | End: 2020-06-16

## 2020-06-16 RX ORDER — BETHANECHOL CHLORIDE 25 MG
1 TABLET ORAL
Qty: 0 | Refills: 0 | DISCHARGE

## 2020-06-16 RX ORDER — SENNA PLUS 8.6 MG/1
2 TABLET ORAL AT BEDTIME
Refills: 0 | Status: DISCONTINUED | OUTPATIENT
Start: 2020-06-16 | End: 2020-06-24

## 2020-06-16 RX ORDER — FENTANYL CITRATE 50 UG/ML
25 INJECTION INTRAVENOUS ONCE
Refills: 0 | Status: DISCONTINUED | OUTPATIENT
Start: 2020-06-16 | End: 2020-06-16

## 2020-06-16 RX ORDER — ISOSORBIDE MONONITRATE 60 MG/1
30 TABLET, EXTENDED RELEASE ORAL EVERY 24 HOURS
Refills: 0 | Status: DISCONTINUED | OUTPATIENT
Start: 2020-06-17 | End: 2020-06-20

## 2020-06-16 RX ORDER — INSULIN LISPRO 100/ML
VIAL (ML) SUBCUTANEOUS AT BEDTIME
Refills: 0 | Status: DISCONTINUED | OUTPATIENT
Start: 2020-06-16 | End: 2020-06-16

## 2020-06-16 RX ORDER — CEFAZOLIN SODIUM 1 G
VIAL (EA) INJECTION
Refills: 0 | Status: DISCONTINUED | OUTPATIENT
Start: 2020-06-16 | End: 2020-06-16

## 2020-06-16 RX ORDER — SUCRALFATE 1 G
1 TABLET ORAL
Refills: 0 | Status: DISCONTINUED | OUTPATIENT
Start: 2020-06-16 | End: 2020-06-24

## 2020-06-16 RX ORDER — CEFAZOLIN SODIUM 1 G
2000 VIAL (EA) INJECTION ONCE
Refills: 0 | Status: COMPLETED | OUTPATIENT
Start: 2020-06-16 | End: 2020-06-16

## 2020-06-16 RX ORDER — GLUCAGON INJECTION, SOLUTION 0.5 MG/.1ML
1 INJECTION, SOLUTION SUBCUTANEOUS ONCE
Refills: 0 | Status: DISCONTINUED | OUTPATIENT
Start: 2020-06-16 | End: 2020-06-16

## 2020-06-16 RX ORDER — TAMSULOSIN HYDROCHLORIDE 0.4 MG/1
0.8 CAPSULE ORAL AT BEDTIME
Refills: 0 | Status: DISCONTINUED | OUTPATIENT
Start: 2020-06-16 | End: 2020-06-24

## 2020-06-16 RX ORDER — DEXTROSE 50 % IN WATER 50 %
25 SYRINGE (ML) INTRAVENOUS ONCE
Refills: 0 | Status: DISCONTINUED | OUTPATIENT
Start: 2020-06-16 | End: 2020-06-24

## 2020-06-16 RX ORDER — SODIUM CHLORIDE 9 MG/ML
1000 INJECTION INTRAMUSCULAR; INTRAVENOUS; SUBCUTANEOUS
Refills: 0 | Status: COMPLETED | OUTPATIENT
Start: 2020-06-16 | End: 2020-06-16

## 2020-06-16 RX ORDER — GLUCAGON INJECTION, SOLUTION 0.5 MG/.1ML
1 INJECTION, SOLUTION SUBCUTANEOUS ONCE
Refills: 0 | Status: DISCONTINUED | OUTPATIENT
Start: 2020-06-16 | End: 2020-06-24

## 2020-06-16 RX ORDER — SODIUM CHLORIDE 9 MG/ML
1000 INJECTION, SOLUTION INTRAVENOUS
Refills: 0 | Status: DISCONTINUED | OUTPATIENT
Start: 2020-06-16 | End: 2020-06-17

## 2020-06-16 RX ORDER — ONDANSETRON 8 MG/1
4 TABLET, FILM COATED ORAL ONCE
Refills: 0 | Status: COMPLETED | OUTPATIENT
Start: 2020-06-16 | End: 2020-06-16

## 2020-06-16 RX ORDER — OMEPRAZOLE 10 MG/1
1 CAPSULE, DELAYED RELEASE ORAL
Qty: 0 | Refills: 0 | DISCHARGE

## 2020-06-16 RX ORDER — SODIUM CHLORIDE 9 MG/ML
1000 INJECTION, SOLUTION INTRAVENOUS
Refills: 0 | Status: DISCONTINUED | OUTPATIENT
Start: 2020-06-16 | End: 2020-06-16

## 2020-06-16 RX ORDER — FERROUS SULFATE 325(65) MG
325 TABLET ORAL
Refills: 0 | Status: DISCONTINUED | OUTPATIENT
Start: 2020-06-17 | End: 2020-06-24

## 2020-06-16 RX ORDER — SODIUM CHLORIDE 9 MG/ML
1000 INJECTION, SOLUTION INTRAVENOUS
Refills: 0 | Status: DISCONTINUED | OUTPATIENT
Start: 2020-06-16 | End: 2020-06-24

## 2020-06-16 RX ORDER — DEXTROSE 50 % IN WATER 50 %
12.5 SYRINGE (ML) INTRAVENOUS ONCE
Refills: 0 | Status: DISCONTINUED | OUTPATIENT
Start: 2020-06-16 | End: 2020-06-16

## 2020-06-16 RX ORDER — DEXTROSE 50 % IN WATER 50 %
25 SYRINGE (ML) INTRAVENOUS ONCE
Refills: 0 | Status: DISCONTINUED | OUTPATIENT
Start: 2020-06-16 | End: 2020-06-16

## 2020-06-16 RX ORDER — LOSARTAN POTASSIUM 100 MG/1
25 TABLET, FILM COATED ORAL DAILY
Refills: 0 | Status: DISCONTINUED | OUTPATIENT
Start: 2020-06-17 | End: 2020-06-20

## 2020-06-16 RX ORDER — ATORVASTATIN CALCIUM 80 MG/1
40 TABLET, FILM COATED ORAL AT BEDTIME
Refills: 0 | Status: DISCONTINUED | OUTPATIENT
Start: 2020-06-16 | End: 2020-06-24

## 2020-06-16 RX ORDER — SODIUM CHLORIDE 9 MG/ML
1000 INJECTION INTRAMUSCULAR; INTRAVENOUS; SUBCUTANEOUS ONCE
Refills: 0 | Status: COMPLETED | OUTPATIENT
Start: 2020-06-16 | End: 2020-06-16

## 2020-06-16 RX ORDER — INSULIN LISPRO 100/ML
VIAL (ML) SUBCUTANEOUS
Refills: 0 | Status: DISCONTINUED | OUTPATIENT
Start: 2020-06-16 | End: 2020-06-21

## 2020-06-16 RX ORDER — PROTHROMBIN COMPLEX CONCENTRATE (HUMAN) 25.5; 16.5; 24; 22; 22; 26 [IU]/ML; [IU]/ML; [IU]/ML; [IU]/ML; [IU]/ML; [IU]/ML
2500 POWDER, FOR SOLUTION INTRAVENOUS ONCE
Refills: 0 | Status: COMPLETED | OUTPATIENT
Start: 2020-06-16 | End: 2020-06-16

## 2020-06-16 RX ORDER — DEXTROSE 50 % IN WATER 50 %
15 SYRINGE (ML) INTRAVENOUS ONCE
Refills: 0 | Status: DISCONTINUED | OUTPATIENT
Start: 2020-06-16 | End: 2020-06-24

## 2020-06-16 RX ORDER — FAMOTIDINE 10 MG/ML
20 INJECTION INTRAVENOUS EVERY 24 HOURS
Refills: 0 | Status: DISCONTINUED | OUTPATIENT
Start: 2020-06-17 | End: 2020-06-24

## 2020-06-16 RX ORDER — INSULIN GLARGINE 100 [IU]/ML
10 INJECTION, SOLUTION SUBCUTANEOUS
Qty: 0 | Refills: 0 | DISCHARGE

## 2020-06-16 RX ORDER — PANTOPRAZOLE SODIUM 20 MG/1
40 TABLET, DELAYED RELEASE ORAL
Refills: 0 | Status: DISCONTINUED | OUTPATIENT
Start: 2020-06-16 | End: 2020-06-24

## 2020-06-16 RX ORDER — MORPHINE SULFATE 50 MG/1
4 CAPSULE, EXTENDED RELEASE ORAL ONCE
Refills: 0 | Status: DISCONTINUED | OUTPATIENT
Start: 2020-06-16 | End: 2020-06-16

## 2020-06-16 RX ORDER — DEXTROSE 50 % IN WATER 50 %
12.5 SYRINGE (ML) INTRAVENOUS ONCE
Refills: 0 | Status: DISCONTINUED | OUTPATIENT
Start: 2020-06-16 | End: 2020-06-24

## 2020-06-16 RX ORDER — DEXTROSE 50 % IN WATER 50 %
15 SYRINGE (ML) INTRAVENOUS ONCE
Refills: 0 | Status: DISCONTINUED | OUTPATIENT
Start: 2020-06-16 | End: 2020-06-16

## 2020-06-16 RX ORDER — INSULIN LISPRO 100/ML
VIAL (ML) SUBCUTANEOUS AT BEDTIME
Refills: 0 | Status: DISCONTINUED | OUTPATIENT
Start: 2020-06-16 | End: 2020-06-21

## 2020-06-16 RX ORDER — ASCORBIC ACID 60 MG
1000 TABLET,CHEWABLE ORAL DAILY
Refills: 0 | Status: DISCONTINUED | OUTPATIENT
Start: 2020-06-16 | End: 2020-06-24

## 2020-06-16 RX ORDER — CEFAZOLIN SODIUM 1 G
2000 VIAL (EA) INJECTION EVERY 8 HOURS
Refills: 0 | Status: DISCONTINUED | OUTPATIENT
Start: 2020-06-16 | End: 2020-06-19

## 2020-06-16 RX ORDER — ONDANSETRON 8 MG/1
4 TABLET, FILM COATED ORAL ONCE
Refills: 0 | Status: DISCONTINUED | OUTPATIENT
Start: 2020-06-16 | End: 2020-06-24

## 2020-06-16 RX ORDER — INSULIN LISPRO 100/ML
VIAL (ML) SUBCUTANEOUS
Refills: 0 | Status: DISCONTINUED | OUTPATIENT
Start: 2020-06-16 | End: 2020-06-16

## 2020-06-16 RX ADMIN — SODIUM CHLORIDE 1000 MILLILITER(S): 9 INJECTION INTRAMUSCULAR; INTRAVENOUS; SUBCUTANEOUS at 11:05

## 2020-06-16 RX ADMIN — MORPHINE SULFATE 4 MILLIGRAM(S): 50 CAPSULE, EXTENDED RELEASE ORAL at 11:41

## 2020-06-16 RX ADMIN — ATORVASTATIN CALCIUM 40 MILLIGRAM(S): 80 TABLET, FILM COATED ORAL at 22:27

## 2020-06-16 RX ADMIN — SODIUM CHLORIDE 1000 MILLILITER(S): 9 INJECTION INTRAMUSCULAR; INTRAVENOUS; SUBCUTANEOUS at 10:53

## 2020-06-16 RX ADMIN — SODIUM CHLORIDE 1000 MILLILITER(S): 9 INJECTION INTRAMUSCULAR; INTRAVENOUS; SUBCUTANEOUS at 13:04

## 2020-06-16 RX ADMIN — TAMSULOSIN HYDROCHLORIDE 0.8 MILLIGRAM(S): 0.4 CAPSULE ORAL at 22:27

## 2020-06-16 RX ADMIN — SODIUM CHLORIDE 1000 MILLILITER(S): 9 INJECTION INTRAMUSCULAR; INTRAVENOUS; SUBCUTANEOUS at 12:04

## 2020-06-16 RX ADMIN — Medication 1 GRAM(S): at 23:49

## 2020-06-16 RX ADMIN — PROTHROMBIN COMPLEX CONCENTRATE (HUMAN) 400 INTERNATIONAL UNIT(S): 25.5; 16.5; 24; 22; 22; 26 POWDER, FOR SOLUTION INTRAVENOUS at 13:26

## 2020-06-16 RX ADMIN — Medication 1: at 16:58

## 2020-06-16 RX ADMIN — FENTANYL CITRATE 25 MICROGRAM(S): 50 INJECTION INTRAVENOUS at 16:29

## 2020-06-16 RX ADMIN — ONDANSETRON 4 MILLIGRAM(S): 8 TABLET, FILM COATED ORAL at 11:42

## 2020-06-16 RX ADMIN — ONDANSETRON 4 MILLIGRAM(S): 8 TABLET, FILM COATED ORAL at 14:02

## 2020-06-16 RX ADMIN — Medication 100 MILLIGRAM(S): at 13:19

## 2020-06-16 RX ADMIN — Medication 100 MILLIGRAM(S): at 22:27

## 2020-06-16 NOTE — CONSULT NOTE ADULT - SUBJECTIVE AND OBJECTIVE BOX
[facility]         Bolivar Carbajal, El, Brittany, Zahra, Román Jacobs        [phone] (office)    CHIEF COMPLAINT: Patient is a 49y old  Male who presents with a chief complaint of bleeding wound in foot    HPI:  49m CAD s/p BMS to RCA (8/2019), RUL subsegmental PE (6/2019, on Eliquis), hx of NSTEMI and PAF s/p ex-lap omentopexy and plication for perforated antral ulcer (5/2019),  CVA s/p tPA (8/2019), HTN and DM2, osteomyelitis s/p debridement.  s/p left tma 5/2020, without complication.    Reports that for the last month he has been bleeding from his wound.  He went to wound care today and his wound was felt to be foul smelling/draining.  He was referred to the er for eval and admission.    In the er his bp was initially marginal.  He dropped his bp to 90/40, and had two episodes of bleeding from the wound, which was wrapped with a pressure dressing.    The patient reports no new symptoms.  Denies chest discomfort and shortness of breath.  No abdominal pain.  No new neurologic symptoms.      PAST MEDICAL & SURGICAL HISTORY:  Cerebrovascular accident  CAD S/P percutaneous coronary angioplasty  Osteomyelitis: s/p debridement  History of non-ST elevation myocardial infarction (NSTEMI)  Pulmonary embolism  Perforated gastric ulcer: s/p emergent ex-lap omentopexy and plication 6/2019  BPH (benign prostatic hyperplasia)  Hypertension  Afib  Diabetes mellitus with no complication  Diabetes  Traumatic amputation of left foot, initial encounter  Perforated gastric ulcer  H/O abdominal surgery      SOCIAL HISTORY: No active tobacco, alcohol or illicit drug use    FAMILY HISTORY:  FH: stroke: Father  FH: coronary artery disease: Father  FH: pulmonary embolism: Mother   No pertinent family history of CAD       MEDICATIONS  (STANDING):  sodium chloride 0.9% Bolus 1000 milliLiter(s) IV Bolus every 1 hour    MEDICATIONS  (PRN):      Allergies    fish (Hives)  No Known Drug Allergies    Intolerances        REVIEW OF SYSTEMS: Is negative for eye, ENT, GI, , allergic, dermatologic, musculoskeletal and neurologic, except as described above.    VITAL SIGNS:   Vital Signs Last 24 Hrs  T(C): 37.2 (16 Jun 2020 09:32), Max: 37.2 (16 Jun 2020 09:32)  T(F): 99 (16 Jun 2020 09:32), Max: 99 (16 Jun 2020 09:32)  HR: 79 (16 Jun 2020 09:32) (79 - 79)  BP: 109/67 (16 Jun 2020 09:32) (109/67 - 109/67)  BP(mean): --  RR: 18 (16 Jun 2020 09:32) (18 - 18)  SpO2: 96% (16 Jun 2020 09:32) (96% - 96%)    I&O's Summary      PHYSICAL EXAM:    Constitutional: NAD, awake and alert, well-developed  Eyes:  EOMI, no oral cyanosis, conjunctivae clear, anicteric.  Pulmonary: Non-labored, breath sounds are clear bilaterally, no wheezing, rales or rhonchi  Cardiovascular:  regular S1 and S2. No murmur.  No rubs, gallops or clicks  Gastrointestinal: Bowel Sounds present, soft, nontender.   Lymph: mild peripheral edema. bloody dressing noted  Neurological: Alert, strength and sensitivity are grossly intact  Skin: No obvious lesions/rashes.   Psych:  Mood & affect appropriate .    LABS: All Labs Reviewed:                        8.5    9.14  )-----------( 371      ( 16 Jun 2020 10:53 )             28.7     16 Jun 2020 10:53    139    |  106    |  11     ----------------------------<  124    4.3     |  27     |  0.92     Ca    8.7        16 Jun 2020 10:53    TPro  6.5    /  Alb  2.3    /  TBili  0.3    /  DBili  x      /  AST  10     /  ALT  12     /  AlkPhos  108    16 Jun 2020 10:53    PT/INR - ( 16 Jun 2020 10:53 )   PT: 17.6 sec;   INR: 1.55 ratio         PTT - ( 16 Jun 2020 10:53 )  PTT:35.2 sec      Blood Culture:   06-16 @ 10:53  Pro Bnp 873        RADIOLOGY:  < from: TTE Echo Doppler w/o Cont (12.24.19 @ 13:55) >     EXAM:  ECHO TTE WO CON COMP W DOPPLR         PROCEDURE DATE:  12/24/2019        INTERPRETATION:  INDICATION: Coronary artery disease    Blood Pressure 117/71    Height 187     Weight 93       BSA 2.2    Dimensions:    LA 4.2       Normal Values: 2.0 - 4.0 cm    Ao 4.0        Normal Values: 2.0 - 3.8 cm  SEPTUM 1.6       Normal Values: 0.6 - 1.2 cm  PWT 1.6       Normal Values: 0.6 - 1.1 cm  LVIDd 5.8         Normal Values: 3.0 - 5.6 cm  LVIDs 3.2         Normal Values: 1.8 - 4.0 cm    Derived Variables:  LVMI g/m2  RWT  Fractional Short  Ejection Fraction    Doppler Peak v. AoV= (m/sec)    OBSERVATIONS:    Mitral Valve: normal, mild MR.  Aortic Valve/Aorta: normal trileaflet aortic valve.  Tricuspid Valve: normal with trace TR.  Pulmonic Valve: normal  Left Atrium: Enlarged  Right Atrium: normal  Left Ventricle: Severe concentric LVH with normal systolic function, estimated LVEF of 65%.  Right Ventricle: normal size and systolic function.  Pericardium/Pleura: no significant pleural effusion, no significant pericardial effusion.  Pulmonary/RV Pressure: Inadequate TR jet to estimate PA systolic pressure  LV Diastolic Function: Grade 2diastolic dysfunction.    Severe concentric LVH, and mild LV enlargement, with normal systolic function, estimated LVEF of 65%. Normal right ventricular size and systolic function. Grade 2diastolic dysfunction. Left atrial enlargement The aortic root is normal in size. The mitral valve is structurally normal, mild MR. The aortic valve is trileaflet without stenosis or insufficiency. Trace physiologic TR. Inadequate TR jet to estimate PA systolic pressure No significant pericardial effusion.                  JOVANNI ALVAREZ M.D., ATTENDING CARDIOLOGIST  This document has been electronically signed. Dec 25 2019 11:45AM                < end of copied text >    EKG: sb [facility]         Bolivar Carbajal, El, Brittany, Zahra, Román Jacobs        [phone] (office)    CHIEF COMPLAINT: Patient is a 49y old  Male who presents with a chief complaint of bleeding wound in foot    HPI:  49m CAD s/p BMS to RCA (2019), RUL subsegmental PE (2019, on Eliquis), hx of NSTEMI and PAF s/p ex-lap omentopexy and plication for perforated antral ulcer (2019),  CVA s/p tPA (2019), HTN and DM2, osteomyelitis s/p debridement.  s/p left tma 2020, without complication.    Reports that for the last month he has been bleeding from his wound.  He went to wound care today and his wound was felt to be foul smelling/draining.  He was referred to the er for eval and admission.    In the er his bp was initially marginal.  He dropped his bp to 90/40, and had two episodes of bleeding from the wound, which was wrapped with a pressure dressing.    The patient reports no new symptoms.  Denies chest discomfort and shortness of breath.  No abdominal pain.  No new neurologic symptoms.      PAST MEDICAL & SURGICAL HISTORY:  Cerebrovascular accident  CAD S/P percutaneous coronary angioplasty  Osteomyelitis: s/p debridement  History of non-ST elevation myocardial infarction (NSTEMI)  Pulmonary embolism  Perforated gastric ulcer: s/p emergent ex-lap omentopexy and plication 2019  BPH (benign prostatic hyperplasia)  Hypertension  Afib  Diabetes mellitus with no complication  Diabetes  Traumatic amputation of left foot, initial encounter  Perforated gastric ulcer  H/O abdominal surgery      SOCIAL HISTORY: No active tobacco, alcohol or illicit drug use    FAMILY HISTORY:  FH: stroke: Father  FH: coronary artery disease: Father  FH: pulmonary embolism: Mother   No pertinent family history of CAD       MEDICATIONS  (STANDING):  sodium chloride 0.9% Bolus 1000 milliLiter(s) IV Bolus every 1 hour    MEDICATIONS  (PRN):      Allergies    fish (Hives)  No Known Drug Allergies    Intolerances        REVIEW OF SYSTEMS: Is negative for eye, ENT, GI, , allergic, dermatologic, musculoskeletal and neurologic, except as described above.    VITAL SIGNS:   Vital Signs Last 24 Hrs  T(C): 37.2 (2020 09:32), Max: 37.2 (2020 09:32)  T(F): 99 (2020 09:32), Max: 99 (2020 09:32)  HR: 79 (2020 09:32) (79 - 79)  BP: 109/67 (2020 09:32) (109/67 - 109/67)  BP(mean): --  RR: 18 (2020 09:32) (18 - 18)  SpO2: 96% (2020 09:32) (96% - 96%)    I&O's Summary      PHYSICAL EXAM:    Constitutional: NAD, awake and alert, well-developed  Eyes:  EOMI, no oral cyanosis, conjunctivae clear, anicteric.  Pulmonary: Non-labored, breath sounds are clear bilaterally, no wheezing, rales or rhonchi  Cardiovascular:  regular S1 and S2. No murmur.  No rubs, gallops or clicks  Gastrointestinal: Bowel Sounds present, soft, nontender.   Lymph: mild peripheral edema. bloody dressing noted  Neurological: Alert, strength and sensitivity are grossly intact  Skin: No obvious lesions/rashes.   Psych:  Mood & affect appropriate .    LABS: All Labs Reviewed:                        8.5    9.14  )-----------( 371      ( 2020 10:53 )             28.7     2020 10:53    139    |  106    |  11     ----------------------------<  124    4.3     |  27     |  0.92     Ca    8.7        2020 10:53    TPro  6.5    /  Alb  2.3    /  TBili  0.3    /  DBili  x      /  AST  10     /  ALT  12     /  AlkPhos  108    2020 10:53    PT/INR - ( 2020 10:53 )   PT: 17.6 sec;   INR: 1.55 ratio         PTT - ( 2020 10:53 )  PTT:35.2 sec      Blood Culture:   16 @ 10:53  Pro Bnp 873        RADIOLOGY:  < from: TTE Echo Doppler w/o Cont (19 @ 13:55) >     EXAM:  ECHO TTE WO CON COMP W DOPPLR         PROCEDURE DATE:  2019        INTERPRETATION:  INDICATION: Coronary artery disease    Blood Pressure 117/71    Height 187     Weight 93       BSA 2.2    Dimensions:    LA 4.2       Normal Values: 2.0 - 4.0 cm    Ao 4.0        Normal Values: 2.0 - 3.8 cm  SEPTUM 1.6       Normal Values: 0.6 - 1.2 cm  PWT 1.6       Normal Values: 0.6 - 1.1 cm  LVIDd 5.8         Normal Values: 3.0 - 5.6 cm  LVIDs 3.2         Normal Values: 1.8 - 4.0 cm    Derived Variables:  LVMI g/m2  RWT  Fractional Short  Ejection Fraction    Doppler Peak v. AoV= (m/sec)    OBSERVATIONS:    Mitral Valve: normal, mild MR.  Aortic Valve/Aorta: normal trileaflet aortic valve.  Tricuspid Valve: normal with trace TR.  Pulmonic Valve: normal  Left Atrium: Enlarged  Right Atrium: normal  Left Ventricle: Severe concentric LVH with normal systolic function, estimated LVEF of 65%.  Right Ventricle: normal size and systolic function.  Pericardium/Pleura: no significant pleural effusion, no significant pericardial effusion.  Pulmonary/RV Pressure: Inadequate TR jet to estimate PA systolic pressure  LV Diastolic Function: Grade 2diastolic dysfunction.    Severe concentric LVH, and mild LV enlargement, with normal systolic function, estimated LVEF of 65%. Normal right ventricular size and systolic function. Grade 2diastolic dysfunction. Left atrial enlargement The aortic root is normal in size. The mitral valve is structurally normal, mild MR. The aortic valve is trileaflet without stenosis or insufficiency. Trace physiologic TR. Inadequate TR jet to estimate PA systolic pressure No significant pericardial effusion.                  JOVANNI ALVAREZ M.D., ATTENDING CARDIOLOGIST  This document has been electronically signed. Dec 25 2019 11:45AM          < from: Cardiac Cath Lab - Adult (19 @ 14:11) >    [facility]  Department of Cardiology  [address] [place] [address]  [phone]  Cath Lab Report -- Comprehensive Report  Patient: SARAH MRORISON  Study date: 2019  Account number: [id number]  MR number: [id number]  : 1970  Gender: Male  Race: W  Case Physician(s):  Laurie Tsai M.D.  Referring Physician:  Ed Gallegos M.D.  INDICATIONS: Initial NSTEMI.  HISTORY: There was no prior cardiac history. The patient has hypertension  and medication-treated dyslipidemia. Recent perforated Ulcer and Pulmonary  embolism PRIOR CARDIOVASCULAR PROCEDURES: None.  PROCEDURE:  --  Left heart catheterization.  --  Left coronary angiography.  --  Right coronary angiography.  TECHNIQUE: The risks and alternatives of the procedures and conscious  sedation were explained to the patient and informed consent was obtained.  Cardiac catheterization performed electively.  Local anesthetic given. Right radial artery access. A 6FR PRELUDE KIT was  inserted inthe vessel. Left heart catheterization. A 5FR FR4.0 EXPO  catheter was utilized. After recording ascending aortic pressure, the  catheter was advanced across the aortic valve and left ventricular  pressure was recorded. Left coronary artery angiography. The vessel was  injected utilizing a 5FR FL3.5 EXPO catheter. Right coronary artery  angiography. The vessel was injected utilizing a 5FR FR4.0 EXPO catheter.  RADIATION EXPOSURE: 1.4 min.  CONTRAST GIVEN: Omnipaque 50 ml. An IABP was not used.  MEDICATIONS GIVEN: Fentanyl, 25 mcg, IV. Midazolam, 1 mg, IV. Verapamil  (Isoptin, Calan, Covera), 2.5 mg, IA. Heparin, 3000 units, IA.  VENTRICLES: No left ventriculogram was performed.  CORONARY VESSELS: The coronary circulation is right dominant.  LM:   --  LM: Normal.  LAD:   --  Mid LAD: There was a tubular 30 % stenosis.  --  D1: There was a tubular 40 % stenosis.  CX:   --  Circumflex: Angiography showed minor luminal irregularities with  no flow limiting lesions.  --  OM1: Angiography showed minor luminal irregularities with no flow  limiting lesions.  --  OM2: Angiography showed minor luminal irregularities with no flow  limiting lesions.  RCA:   --  Proximal RCA: There was a 95 % stenosis.  --  RPLS: There was a 100 % stenosis.  COMPLICATIONS: There were no complications.  DIAGNOSTIC IMPRESSIONS: Severe RCA disease (95% prox disease, occluded  RPL). Mild LCx and Mild LAD disease.  DIAGNOSTIC RECOMMENDATIONS: Will review with GI/Surgery risk of triple  therapy prior to proceeding with PCI  Prepared and signed by  Laurie Tsai M.D.  Signed 2019 06:51:39  HEMODYNAMIC TABLES  Pressures:  Baseline  Pressures:  - HR: 56  Pressures:  - Rhythm:  Pressures:  -- Aortic Pressure (S/D/M): 135/68/89  Pressures:  -- Left Ventricle (s/edp): 127/17/--  Outputs:  Baseline  Outputs:  -- CALCULATIONS[age]  Outputs:  -- CALCULATIONS: Body Surface Area: 2.15  Outputs:  -- CALCULATIONS: Height in cm: 188.00  Outputs:  -- CALCULATIONS: Sex: Male  Outputs:  -- CALCULATIONS: Weight in k.50    < end of copied text >        < end of copied text >      < from: Cardiac Cath Lab - Adult (19 @ 08:55) >    [facility]  Department of Cardiology  [address] [place] [address]  [phone]  Cath Lab Report -- Comprehensive Report  Patient: SARAH MORRISON  Study date: 2019  Account number: [id number]  MR number: [id number]  : 1970  Gender: Male  Race: W  Case Physician(s):  Laurie Tsai M.D.  Fellow:  Bert Persaud M.D.  Referring Physician:  INDICATIONS: Initial NSTEMI.  HISTORY: There was no prior cardiac history. The patient has hypertension.  PRIOR CARDIOVASCULAR PROCEDURES: None.  PROCEDURE:  --  Intervention on mid RCA: balloon angioplasty, stent, balloon  angioplasty.  TECHNIQUE: The risks and alternatives of the procedures and conscious  sedation were explained to the patient and informed consent was obtained.  Coronary intervention performed electively.  Local anesthetic given. Right radial artery access. A 6FR PRELUDE KIT was  inserted in the vessel. RADIATION EXPOSURE: 5 min. A successful balloon  angioplasty with stent and balloon angioplasty was performed on the 95 %  lesion in the mid RCA. Following intervention there was an excellent  angiographic appearance with a 1 % residual stenosis. This was not a  bifurcation lesion. There was no evidence of the transient no-reflow  phenomenon. There was WILLY 3 flow before the procedure and WILLY 3 flow  after the procedure. Vessel setup was performed. A 6FR JR4 LAUNCHER  guiding catheter was used to intubate the vessel. Vessel setup was  performed. A DIMITRI [id number] wire was used to cross the lesion. Balloon  angioplasty was performed, using a 3.0 X 15 EUPHORA balloon, with 1  inflations and a maximum inflation pressure of 16 kady. A 3.50 X 24MM COBRA  bare-metal stent was placed across the lesion and deployed at a maximum  inflation pressure of 14 kady. Balloon angioplasty was performed, using a  4.00 X 12 NC APEX balloon, with 2 inflations and a maximum inflation  pressure of 18 kady.  CONTRAST GIVEN: Omnipaque 47 ml.  MEDICATIONS GIVEN: Fentanyl, 25 mcg, IV. Midazolam, 1 mg, IV. Verapamil  (Isoptin, Calan, Covera), 2.5 mg, IA. Nitroglycerin, 200 mcg,  intracoronary. Heparin, 3000 units, IA. Heparin, 7000 units, IV.  VENTRICLES: No left ventriculogram was performed.  CORONARY VESSELS: The coronary circulation is right dominant.  RCA:   --  Proximal RCA: There was a 95 % stenosis.  COMPLICATIONS: There were no complications.  INTERVENTIONAL IMPRESSIONS: Successful stenting of the RCA with BMS  INTERVENTIONAL RECOMMENDATIONS: Aspirin for 1 week. Plavix for 3 weeks.  Eliquis for PE (duration TBD) - limit triple therapy for 1 week then  continue with plavix eliquis for 3 weeks. This modified DAPT regimen is  due to his recent perforated ulcer and surgery.  Prepared and signed by  Laurie Tsai M.D.  Signed 2019 13:10:21  HEMODYNAMIC TABLES  Outputs:  Baseline  Outputs:  -- CALCULATIONS: Age in years: 49.11  Outputs:  -- CALCULATIONS: Body Surface Area: 2.15  Outputs:  -- CALCULATIONS: Height in cm: 188.00  Outputs:  -- CALCULATIONS: Sex: Male  Outputs:  -- CALCULATIONS: Weight in k.50    < end of copied text >    EKG: sb

## 2020-06-16 NOTE — H&P ADULT - HISTORY OF PRESENT ILLNESS
49year old Male seen at [facility] ED for left foot TMA wound. Pt was seen in wound care by Dr. Hale today for his left foot TMA (DOS 5.20.2020) Pt relates he went to [facility] last week for the wound and states that the wound was packed and redressed. Pt presented today and wound was painful and foul smelling.     Denies any fever, chills, nausea, vomiting, chest pain, shortness of breath, or calf pain at this time.

## 2020-06-16 NOTE — CONSULT NOTE ADULT - ASSESSMENT
Assessment:    Plan:    #Neuro:  -Neuro checks q 2 hrs and prn for changes  -activity as tolerated  -physical therapy consult when stable    #Pulm:  -Supplemental O2 prn to maintain SPO2 > 92%  -Bronchodilators q 6 hrs prn for sob/wheezes  -HOB >/= 30 degree angle    #CV:  -Hx of CAD, s/p PTCA to Prox RCA  -presents with chest pain, most likely demand ischemia  -ECG now and q am x3  -Cardiac Enzymes now and q am x 3  -pt home meds include imdur/losartan/metoprolol - will hold at present   -home med includes ASA/apixaban - will hold at present    #GI/:  -strict I & O's - keep even  -s/p perf gastric ulcer (2019)   -protonix 40 mg IV qday    #ID:  -pan cultured  -pt with hx of osteomyelititis  -s/p Lt TMA  -will add cefazolin 2gm IV q 8 hr     #FEN/ENDO/HEME:  -obtain CMP/Mg++/PO--4/CBC w diff/PT/PTT/INR now and q. a.m.  -type and cross for PRBC  -transfuse PRBC to maintain Hgb > 8  -as pt with hypovolemic shock secondary  -pt on apixaban and ASA therapy  -will administer Kcentra and platelets  -CBC q 6 hrs        Critical Care Time: 40minutes   Reviewing data, imaging, discussing with multidisciplinary team, not inclusive of procedures, discussing goals of care with family

## 2020-06-16 NOTE — CONSULT NOTE ADULT - SUBJECTIVE AND OBJECTIVE BOX
49year old Male seen at [facility] ED for left foot TMA wound. Pt was seen in wound care by Dr. Hale today for his left foot TMA (DOS 5.20.2020) Pt relates he went to [facility] last week for the wound and states that the wound was packed and redressed. Pt presented today and wound was painful and foul smelling.     Denies any fever, chills, nausea, vomiting, chest pain, shortness of breath, or calf pain at this time.    REVIEW OF SYSTEMS    PAST MEDICAL & SURGICAL HISTORY:  Cerebrovascular accident  CAD S/P percutaneous coronary angioplasty  Osteomyelitis: s/p debridement  History of non-ST elevation myocardial infarction (NSTEMI)  Pulmonary embolism  Perforated gastric ulcer: s/p emergent ex-lap omentopexy and plication 6/2019  BPH (benign prostatic hyperplasia)  Hypertension  Afib  Diabetes mellitus with no complication  Diabetes  Traumatic amputation of left foot, initial encounter  Perforated gastric ulcer  H/O abdominal surgery      Allergies    fish (Hives)  No Known Drug Allergies    Intolerances        MEDICATIONS  (STANDING):  sodium chloride 0.9% Bolus 1000 milliLiter(s) IV Bolus once    MEDICATIONS  (PRN):      Social History:      FAMILY HISTORY:  FH: stroke: Father  FH: coronary artery disease: Father  FH: pulmonary embolism: Mother      Vital Signs Last 24 Hrs  T(C): 37.2 (16 Jun 2020 09:32), Max: 37.2 (16 Jun 2020 09:32)  T(F): 99 (16 Jun 2020 09:32), Max: 99 (16 Jun 2020 09:32)  HR: 79 (16 Jun 2020 09:32) (79 - 79)  BP: 109/67 (16 Jun 2020 09:32) (109/67 - 109/67)  BP(mean): --  RR: 18 (16 Jun 2020 09:32) (18 - 18)  SpO2: 96% (16 Jun 2020 09:32) (96% - 96%)    PHYSICAL EXAM:  Vascular: DP & PT palpable bilaterally, Capillary refill 3 seconds, Digital hair present bilaterally  Neurological: Light touch sensation intact bilaterally  Musculoskeletal: 5/5 strength in all quadrants bilaterally, AJ & STJ ROM intact  Dermatological: ---------- cm ulceration noted to the ----------, granular wound bed, no probe to bone, no periwound erythema, no fluctuance, no malodor, no proximal streaking at this time        ----------CHEM PANEL----------            Imaging: ---------- 49year old Male seen at [facility] ED for left foot TMA wound. Pt was seen in wound care by Dr. Hale today for his left foot TMA (DOS 5.20.2020) Pt relates he went to [facility] last week for the wound and states that the wound was packed and redressed. Pt presented today and wound was painful and foul smelling.     Denies any fever, chills, nausea, vomiting, chest pain, shortness of breath, or calf pain at this time.    REVIEW OF SYSTEMS    PAST MEDICAL & SURGICAL HISTORY:  Cerebrovascular accident  CAD S/P percutaneous coronary angioplasty  Osteomyelitis: s/p debridement  History of non-ST elevation myocardial infarction (NSTEMI)  Pulmonary embolism  Perforated gastric ulcer: s/p emergent ex-lap omentopexy and plication 6/2019  BPH (benign prostatic hyperplasia)  Hypertension  Afib  Diabetes mellitus with no complication  Diabetes  Traumatic amputation of left foot, initial encounter  Perforated gastric ulcer  H/O abdominal surgery      Allergies    fish (Hives)  No Known Drug Allergies    Intolerances        MEDICATIONS  (STANDING):  sodium chloride 0.9% Bolus 1000 milliLiter(s) IV Bolus once    MEDICATIONS  (PRN):      Social History:      FAMILY HISTORY:  FH: stroke: Father  FH: coronary artery disease: Father  FH: pulmonary embolism: Mother      Vital Signs Last 24 Hrs  T(C): 37.2 (16 Jun 2020 09:32), Max: 37.2 (16 Jun 2020 09:32)  T(F): 99 (16 Jun 2020 09:32), Max: 99 (16 Jun 2020 09:32)  HR: 79 (16 Jun 2020 09:32) (79 - 79)  BP: 109/67 (16 Jun 2020 09:32) (109/67 - 109/67)  BP(mean): --  RR: 18 (16 Jun 2020 09:32) (18 - 18)  SpO2: 96% (16 Jun 2020 09:32) (96% - 96%)    PHYSICAL EXAM: Left foot focused    Vascular: DP & PT palpable, Digital hair absent. erythema and edema noted to the dorsal foot.  Neurological: Light touch sensation intact bilaterally  Musculoskeletal: 5/5 strength in all quadrants bilaterally, AJ & STJ ROM intact  Dermatological: 10cm x 6cm x 4 cm ulceration noted to the TMA site. + probe to bone, periwound erythema, + malodor, extensive bleeding.      ----------CHEM PANEL----------            Imaging: ----------    EXAM:  FOOT LEFT (MINIMUM 3 VIEWS)                            PROCEDURE DATE:  06/16/2020          INTERPRETATION:  LEFT FOOT: AP, LATERAL, OBLIQUE    CLINICAL INFORMATION: Left foot infection.    COMPARISON:  5/20/2020.    FINDINGS:    Bandage overlies the foot.    There is marked irregularity with suggestion of gas/air at the distal amputation stump, correlate clinically.    Again noted is transmetatarsal amputation.  There is possible osteolysis at the distal amputation stumps of the third through fifth metatarsals.  If there is a clinical suspicion for an active osteomyelitis, recommend further evaluation with MRI if there are no clinical contraindications.    Impression:    Findings as discussed above.

## 2020-06-16 NOTE — CONSULT NOTE ADULT - SUBJECTIVE AND OBJECTIVE BOX
CHIEF COMPLAINT: lightheadedness, dizziness, Lt TMA wound bleeding    HPI:  49 yr old male with PMHx of Afib on apixaban/ASA, DM hypothyroidism CAD, NSTEMI s/p stent to prox RCA, CVA s/p tPA (8/2019) without residual, s/p P.E., osteomyelitis s/p debridement, s/p Lt transmetatarsal resection 5/20/2020 who presents this morning from wound care after being found to have foul smelling drainage with increased pain from surgical site.         Pt endorses wound bleeding  over past month receiving total 4 units PRBC's: 2 on 6/4/20 and 6/12/20 in addition pt receiving recent wound packing at [facility] one week prior to presentation        In E.D. noted to Hypotension with SBP to 60's, chest pain, dizziness, lightheadedness with two episodes of bleeding from wound.  In E.D. Pt received 3 liter NS, 1 unit of PRBC with improvement of SBP to 90's and relief of chest pain.         consult called for hypovolemic shock secondary to acute blood loss from Lt TMA surgical site    PAST MEDICAL & SURGICAL HISTORY:  Cerebrovascular accident  CAD S/P percutaneous coronary angioplasty  Osteomyelitis: s/p debridement  History of non-ST elevation myocardial infarction (NSTEMI)  Pulmonary embolism  Perforated gastric ulcer: s/p emergent ex-lap omentopexy and plication 6/2019  BPH (benign prostatic hyperplasia)  Hypertension  Afib  Diabetes mellitus with no complication  Diabetes  Traumatic amputation of left foot, initial encounter  Perforated gastric ulcer  H/O abdominal surgery      FAMILY HISTORY:  FH: stroke: Father  FH: coronary artery disease: Father  FH: pulmonary embolism: Mother      SOCIAL HISTORY:  Smoking: [ ] Never Smoked [ ] Former Smoker (__ packs x ___ years) [ ] Current Smoker  (__ packs x ___ years)  Substance Use: [ ] Never Used [ ] Used ____  EtOH Use:  Marital Status: [ ] Single [ ]  [ ]  [ ]   Sexual History:   Occupation:  Recent Travel:  Country of Birth:  Advance Directives:    Allergies    fish (Hives)  No Known Drug Allergies    Intolerances        HOME MEDICATIONS:    REVIEW OF SYSTEMS:            OBJECTIVE:  ICU Vital Signs Last 24 Hrs  T(C): 37.1 (16 Jun 2020 12:35), Max: 37.2 (16 Jun 2020 09:32)  T(F): 98.8 (16 Jun 2020 12:35), Max: 99 (16 Jun 2020 09:32)  HR: 64 (16 Jun 2020 12:39) (57 - 79)  BP: 80/45 (16 Jun 2020 12:39) (60/37 - 109/67)  BP(mean): --  ABP: --  ABP(mean): --  RR: 16 (16 Jun 2020 12:39) (16 - 18)  SpO2: 94% (16 Jun 2020 12:39) (94% - 96%)        CAPILLARY BLOOD GLUCOSE          PHYSICAL EXAM:        HOSPITAL MEDICATIONS:  MEDICATIONS  (STANDING):  ceFAZolin   IVPB      ceFAZolin   IVPB 2000 milliGRAM(s) IV Intermittent every 8 hours  dextrose 5%. 1000 milliLiter(s) (50 mL/Hr) IV Continuous <Continuous>  dextrose 50% Injectable 12.5 Gram(s) IV Push once  dextrose 50% Injectable 25 Gram(s) IV Push once  dextrose 50% Injectable 25 Gram(s) IV Push once  insulin lispro (HumaLOG) corrective regimen sliding scale   SubCutaneous three times a day before meals  insulin lispro (HumaLOG) corrective regimen sliding scale   SubCutaneous at bedtime    MEDICATIONS  (PRN):  dextrose 40% Gel 15 Gram(s) Oral once PRN Blood Glucose LESS THAN 70 milliGRAM(s)/deciliter  glucagon  Injectable 1 milliGRAM(s) IntraMuscular once PRN Glucose LESS THAN 70 milligrams/deciliter      LABS:                        7.9    17.61 )-----------( 388      ( 16 Jun 2020 12:23 )             26.1     Hgb Trend: 7.9<--, 8.5<--  06-16    139  |  106  |  11  ----------------------------<  124<H>  4.3   |  27  |  0.92    Ca    8.7      16 Jun 2020 10:53    TPro  6.5  /  Alb  2.3<L>  /  TBili  0.3  /  DBili  x   /  AST  10<L>  /  ALT  12  /  AlkPhos  108  06-16    Creatinine Trend: 0.92<--, 1.00<--, 0.92<--, 0.95<--  PT/INR - ( 16 Jun 2020 10:53 )   PT: 17.6 sec;   INR: 1.55 ratio         PTT - ( 16 Jun 2020 10:53 )  PTT:35.2 sec          MICROBIOLOGY:     RADIOLOGY:  [ ] Reviewed and interpreted by me    EKG:        Mika ANP-BC (ext. [id number]) CHIEF COMPLAINT: lightheadedness, dizziness, Lt TMA wound bleeding    HPI:  49 yr old male with PMHx of Afib on apixaban/ASA, DM hypothyroidism CAD, NSTEMI s/p PTCA to prox RCA, CVA s/p tPA (8/2019) without residual, s/p perf gastric ulcer s/p omentopexy (6/2019),  s/p P.E., osteomyelitis s/p debridement, s/p Lt transmetatarsal resection 5/20/2020 who presents this morning from wound care after being found to have foul smelling drainage with increased pain from surgical site.         Pt endorses wound bleeding  over past month receiving total 4 units PRBC's: 2 on 6/4/20 and 6/12/20 in addition pt receiving recent wound packing at [facility] one week prior to presentation        In E.D. noted to Hypotension with SBP to 60's, chest pain, dizziness, lightheadedness with two episodes of bleeding from wound.  In E.D. Pt received 3 liter NS, 1 unit of PRBC with improvement of SBP to 90's and relief of chest pain.         consult called for septic shock vs hypovolemic shock secondary to acute blood loss from Lt TMA surgical site and foul smelling wound drainage    PAST MEDICAL & SURGICAL HISTORY:  Cerebrovascular accident  CAD S/P percutaneous coronary angioplasty  Osteomyelitis: s/p debridement  History of non-ST elevation myocardial infarction (NSTEMI)  Pulmonary embolism  Perforated gastric ulcer: s/p emergent ex-lap omentopexy and plication 6/2019  BPH (benign prostatic hyperplasia)  Hypertension  Afib  Diabetes mellitus with no complication  Diabetes  Traumatic amputation of left foot, initial encounter  Perforated gastric ulcer  H/O abdominal surgery      FAMILY HISTORY:  FH: stroke: Father  FH: coronary artery disease: Father  FH: pulmonary embolism: Mother      SOCIAL HISTORY:  Smoking: [ ] Never Smoked [ ] Former Smoker (__ packs x ___ years) [ ] Current Smoker  (__ packs x ___ years)  Substance Use: [ ] Never Used [ ] Used ____  EtOH Use:  Marital Status: [ ] Single [ ]  [ ]  [ ]   Sexual History:   Occupation:  Recent Travel:  Country of Birth:  Advance Directives:    Allergies    fish (Hives)  No Known Drug Allergies    Intolerances        HOME MEDICATIONS:    REVIEW OF SYSTEMS:            OBJECTIVE:  ICU Vital Signs Last 24 Hrs  T(C): 37.1 (16 Jun 2020 12:35), Max: 37.2 (16 Jun 2020 09:32)  T(F): 98.8 (16 Jun 2020 12:35), Max: 99 (16 Jun 2020 09:32)  HR: 64 (16 Jun 2020 12:39) (57 - 79)  BP: 80/45 (16 Jun 2020 12:39) (60/37 - 109/67)  BP(mean): --  ABP: --  ABP(mean): --  RR: 16 (16 Jun 2020 12:39) (16 - 18)  SpO2: 94% (16 Jun 2020 12:39) (94% - 96%)        CAPILLARY BLOOD GLUCOSE          PHYSICAL EXAM:        HOSPITAL MEDICATIONS:  MEDICATIONS  (STANDING):  ceFAZolin   IVPB      ceFAZolin   IVPB 2000 milliGRAM(s) IV Intermittent every 8 hours  dextrose 5%. 1000 milliLiter(s) (50 mL/Hr) IV Continuous <Continuous>  dextrose 50% Injectable 12.5 Gram(s) IV Push once  dextrose 50% Injectable 25 Gram(s) IV Push once  dextrose 50% Injectable 25 Gram(s) IV Push once  insulin lispro (HumaLOG) corrective regimen sliding scale   SubCutaneous three times a day before meals  insulin lispro (HumaLOG) corrective regimen sliding scale   SubCutaneous at bedtime    MEDICATIONS  (PRN):  dextrose 40% Gel 15 Gram(s) Oral once PRN Blood Glucose LESS THAN 70 milliGRAM(s)/deciliter  glucagon  Injectable 1 milliGRAM(s) IntraMuscular once PRN Glucose LESS THAN 70 milligrams/deciliter      LABS:                        7.9    17.61 )-----------( 388      ( 16 Jun 2020 12:23 )             26.1     Hgb Trend: 7.9<--, 8.5<--  06-16    139  |  106  |  11  ----------------------------<  124<H>  4.3   |  27  |  0.92    Ca    8.7      16 Jun 2020 10:53    TPro  6.5  /  Alb  2.3<L>  /  TBili  0.3  /  DBili  x   /  AST  10<L>  /  ALT  12  /  AlkPhos  108  06-16    Creatinine Trend: 0.92<--, 1.00<--, 0.92<--, 0.95<--  PT/INR - ( 16 Jun 2020 10:53 )   PT: 17.6 sec;   INR: 1.55 ratio         PTT - ( 16 Jun 2020 10:53 )  PTT:35.2 sec          MICROBIOLOGY:     RADIOLOGY:  [ ] Reviewed and interpreted by me    EKG:        Mika [facility] (ext. [id number]) CHIEF COMPLAINT: lightheadedness, dizziness, Lt TMA wound bleeding    HPI:  49 yr old male with PMHx of Afib on apixaban/ASA, DM hypothyroidism CAD, NSTEMI s/p PTCA to prox RCA, CVA s/p tPA (8/2019) without residual, s/p perf gastric ulcer s/p omentopexy (6/2019),  s/p P.E., osteomyelitis s/p debridement, s/p Lt transmetatarsal resection 5/20/2020 who presents this morning from wound care after being found to have foul smelling drainage with increased pain from surgical site.         Pt endorses wound bleeding  over past month receiving total 4 units PRBC's: 2 on 6/4/20 and 6/12/20 in addition pt receiving recent wound packing at [facility] one week prior to presentation        In E.D. noted to Hypotension with SBP to 60's, chest pain, dizziness, lightheadedness with two episodes of bleeding from wound.  In E.D. Pt received 3 liter NS, 1 unit of PRBC with improvement of SBP to 90's and relief of chest pain.         consult called for septic shock vs hypovolemic shock secondary to acute blood loss from Lt TMA surgical site and foul smelling wound drainage    PAST MEDICAL & SURGICAL HISTORY:  Cerebrovascular accident  CAD S/P percutaneous coronary angioplasty  Osteomyelitis: s/p debridement  History of non-ST elevation myocardial infarction (NSTEMI)  Pulmonary embolism  Perforated gastric ulcer: s/p emergent ex-lap omentopexy and plication 6/2019  BPH (benign prostatic hyperplasia)  Hypertension  Afib  Diabetes mellitus with no complication  Diabetes  Traumatic amputation of left foot, initial encounter  Perforated gastric ulcer  H/O abdominal surgery      FAMILY HISTORY:  FH: stroke: Father  FH: coronary artery disease: Father  FH: pulmonary embolism: Mother      SOCIAL HISTORY:  Smoking: [ ] Never Smoked [ ] Former Smoker (__ packs x ___ years) [ ] Current Smoker  (__ packs x ___ years)  Substance Use: [ ] Never Used [ ] Used ____  EtOH Use:  Marital Status: [ ] Single [ ]  [ ]  [ ]   Sexual History:   Occupation:  Recent Travel:  Country of Birth:  Advance Directives:    Allergies    fish (Hives)  No Known Drug Allergies    Intolerances        HOME MEDICATIONS:    REVIEW OF SYSTEMS:      CONSTITUTIONAL:           No weakness, fevers or chills  EYES/ENT:           No visual changes;  No vertigo or throat pain   NECK:            No pain or stiffness  RESPIRATORY:            No cough, wheezing, hemoptysis; No shortness of breath  CARDIOVASCULAR:            No chest pain or palpitations  GASTROINTESTINAL:           No abdominal or epigastric pain. No nausea, vomiting, or hematemesis; No diarrhea or constipation. No melena or hematochezia.  GENITOURINARY:            No dysuria, frequency or hematuria  NEUROLOGICAL:            No numbness or weakness  SKIN:            No pruritis , rashes            OBJECTIVE:  ICU Vital Signs Last 24 Hrs  T(C): 37.1 (16 Jun 2020 12:35), Max: 37.2 (16 Jun 2020 09:32)  T(F): 98.8 (16 Jun 2020 12:35), Max: 99 (16 Jun 2020 09:32)  HR: 64 (16 Jun 2020 12:39) (57 - 79)  BP: 80/45 (16 Jun 2020 12:39) (60/37 - 109/67)  BP(mean): --  ABP: --  ABP(mean): --  RR: 16 (16 Jun 2020 12:39) (16 - 18)  SpO2: 94% (16 Jun 2020 12:39) (94% - 96%)        CAPILLARY BLOOD GLUCOSE    VITAL SIGNS AT TIME OF CONSULT  BP: 72/34  ; HR:  62 (NSR) ; RR: 28   ; SPO2: 97% on 3 liters N/C  ; Temp:        PHYSICAL EXAM:  GENERAL:   NAD, well-groomed, well-developed  HEAD:    Atraumatic, Normocephalic  EYES:    PERRLA 3 mm conjunctiva and sclera clear  ENMT:   No oropharyngeal exudates, erythema or lesions,  Moist mucous membranes  NECK:   Supple, no cervical lymphadenopathy, no JVD  NERVOUS SYSTEM:   Alert & Oriented X3, CN II-XII intact, Moves all extremities  ; Upper extremities  5/5; Lower extremities 4/5, full sensation to light touch   CHEST/LUNG:   utilizing 3 liters N/C . Breath sounds bilaterally   without crackles, rhonchi, wheezes, rubs ,able to take deep breaths spontaneously and upon command.    HEART:   cardiac monitor  NSR without ectopy  ; S1/S2 without murmurs, without rubs, or gallops. POCUS  ABDOMEN:   Soft, Nontender, Nondistended; Bowel sounds present, Bladder non distended, non palpable  EXTREMITIES:   Pulses palpable radial pulses 2+ bilat,Rt DP/PT 1+/1+, Lt pop 2+, without clubbing, cyanosis. Digits warm to touch with good cap refill < 3 secs of upper extremities and Rt lower extremities. Left foot with pressure dressing  SKIN:   warm, dry, intact, normal color, no rash or abnormal lesions, without palpable nodes      HOSPITAL MEDICATIONS:  MEDICATIONS  (STANDING):  ceFAZolin   IVPB      ceFAZolin   IVPB 2000 milliGRAM(s) IV Intermittent every 8 hours  dextrose 5%. 1000 milliLiter(s) (50 mL/Hr) IV Continuous <Continuous>  dextrose 50% Injectable 12.5 Gram(s) IV Push once  dextrose 50% Injectable 25 Gram(s) IV Push once  dextrose 50% Injectable 25 Gram(s) IV Push once  insulin lispro (HumaLOG) corrective regimen sliding scale   SubCutaneous three times a day before meals  insulin lispro (HumaLOG) corrective regimen sliding scale   SubCutaneous at bedtime    MEDICATIONS  (PRN):  dextrose 40% Gel 15 Gram(s) Oral once PRN Blood Glucose LESS THAN 70 milliGRAM(s)/deciliter  glucagon  Injectable 1 milliGRAM(s) IntraMuscular once PRN Glucose LESS THAN 70 milligrams/deciliter      LABS:                        7.9    17.61 )-----------( 388      ( 16 Jun 2020 12:23 )             26.1     Hgb Trend: 7.9<--, 8.5<--  06-16    139  |  106  |  11  ----------------------------<  124<H>  4.3   |  27  |  0.92    Ca    8.7      16 Jun 2020 10:53    TPro  6.5  /  Alb  2.3<L>  /  TBili  0.3  /  DBili  x   /  AST  10<L>  /  ALT  12  /  AlkPhos  108  06-16    Creatinine Trend: 0.92<--, 1.00<--, 0.92<--, 0.95<--  PT/INR - ( 16 Jun 2020 10:53 )   PT: 17.6 sec;   INR: 1.55 ratio         PTT - ( 16 Jun 2020 10:53 )  PTT:35.2 sec          MICROBIOLOGY:     RADIOLOGY:  [ ] Reviewed and interpreted by me    EKG:        Mika ANP-BC (ext. [id number]) CHIEF COMPLAINT: lightheadedness, dizziness, Lt TMA wound bleeding    HPI:  49 yr old male with PMHx of Afib on apixaban/ASA, DM hyperthyroidism CAD, NSTEMI s/p PTCA to prox RCA, CVA s/p tPA (8/2019) without residual, s/p perf gastric ulcer s/p omentopexy (6/2019),  s/p P.E., osteomyelitis s/p debridement, s/p Lt transmetatarsal resection 5/20/2020 who presents this morning from wound care after being found to have foul smelling drainage with increased pain from surgical site.         Pt endorses wound bleeding  over past month receiving total 4 units PRBC's: 2 on 6/4/20 and 6/12/20 in addition pt receiving recent wound packing at [facility] one week prior to presentation        In E.D. noted to Hypotension with SBP to 60's, chest pain, dizziness, lightheadedness with two episodes of bleeding from wound.  In E.D. Pt received 3 liter NS, 1 unit of PRBC with improvement of SBP to 90's and relief of chest pain.         consult called for septic shock vs hypovolemic shock secondary to acute blood loss from Lt TMA surgical site and foul smelling wound drainage    PAST MEDICAL & SURGICAL HISTORY:  Cerebrovascular accident  CAD S/P percutaneous coronary angioplasty  Osteomyelitis: s/p debridement  History of non-ST elevation myocardial infarction (NSTEMI)  Pulmonary embolism  Perforated gastric ulcer: s/p emergent ex-lap omentopexy and plication 6/2019  BPH (benign prostatic hyperplasia)  Hypertension  Afib  Diabetes mellitus with no complication  Diabetes  Traumatic amputation of left foot, initial encounter  Perforated gastric ulcer  H/O abdominal surgery      FAMILY HISTORY:  FH: stroke: Father  FH: coronary artery disease: Father  FH: pulmonary embolism: Mother      SOCIAL HISTORY:  Smoking: [ ] Never Smoked [ ] Former Smoker (__ packs x ___ years) [ ] Current Smoker  (__ packs x ___ years)  Substance Use: [ ] Never Used [ ] Used ____  EtOH Use:  Marital Status: [ ] Single [ ]  [ ]  [ ]   Sexual History:   Occupation:  Recent Travel:  Country of Birth:  Advance Directives:    Allergies    fish (Hives)  No Known Drug Allergies    Intolerances        HOME MEDICATIONS:    REVIEW OF SYSTEMS:      CONSTITUTIONAL:           No weakness, fevers or chills  EYES/ENT:           No visual changes;  No vertigo or throat pain   NECK:            No pain or stiffness  RESPIRATORY:            No cough, wheezing, hemoptysis; No shortness of breath  CARDIOVASCULAR:            No chest pain or palpitations  GASTROINTESTINAL:           No abdominal or epigastric pain. No nausea, vomiting, or hematemesis; No diarrhea or constipation. No melena or hematochezia.  GENITOURINARY:            No dysuria, frequency or hematuria  NEUROLOGICAL:            No numbness or weakness  SKIN:            No pruritis , rashes            OBJECTIVE:  ICU Vital Signs Last 24 Hrs  T(C): 37.1 (16 Jun 2020 12:35), Max: 37.2 (16 Jun 2020 09:32)  T(F): 98.8 (16 Jun 2020 12:35), Max: 99 (16 Jun 2020 09:32)  HR: 64 (16 Jun 2020 12:39) (57 - 79)  BP: 80/45 (16 Jun 2020 12:39) (60/37 - 109/67)  BP(mean): --  ABP: --  ABP(mean): --  RR: 16 (16 Jun 2020 12:39) (16 - 18)  SpO2: 94% (16 Jun 2020 12:39) (94% - 96%)        CAPILLARY BLOOD GLUCOSE    VITAL SIGNS AT TIME OF CONSULT  BP: 72/34  ; HR:  62 (NSR) ; RR: 28   ; SPO2: 97% on 3 liters N/C  ; Temp:        PHYSICAL EXAM:  GENERAL:   NAD, well-groomed, well-developed  HEAD:    Atraumatic, Normocephalic  EYES:    PERRLA 3 mm conjunctiva and sclera clear  ENMT:   No oropharyngeal exudates, erythema or lesions,  Moist mucous membranes  NECK:   Supple, no cervical lymphadenopathy, no JVD  NERVOUS SYSTEM:   Alert & Oriented X3, CN II-XII intact, Moves all extremities  ; Upper extremities  5/5; Lower extremities 4/5, full sensation to light touch   CHEST/LUNG:   utilizing 3 liters N/C . Breath sounds bilaterally   without crackles, rhonchi, wheezes, rubs ,able to take deep breaths spontaneously and upon command.    HEART:   cardiac monitor  NSR without ectopy  ; S1/S2 without murmurs, without rubs, or gallops. POCUS  ABDOMEN:   Soft, Nontender, Nondistended; Bowel sounds present, Bladder non distended, non palpable  EXTREMITIES:   Pulses palpable radial pulses 2+ bilat,Rt DP/PT 1+/1+, Lt pop 2+, without clubbing, cyanosis. Digits warm to touch with good cap refill < 3 secs of upper extremities and Rt lower extremities. Left foot with pressure dressing  SKIN:   warm, dry, intact, normal color, no rash or abnormal lesions, without palpable nodes      HOSPITAL MEDICATIONS:  MEDICATIONS  (STANDING):  ceFAZolin   IVPB      ceFAZolin   IVPB 2000 milliGRAM(s) IV Intermittent every 8 hours  dextrose 5%. 1000 milliLiter(s) (50 mL/Hr) IV Continuous <Continuous>  dextrose 50% Injectable 12.5 Gram(s) IV Push once  dextrose 50% Injectable 25 Gram(s) IV Push once  dextrose 50% Injectable 25 Gram(s) IV Push once  insulin lispro (HumaLOG) corrective regimen sliding scale   SubCutaneous three times a day before meals  insulin lispro (HumaLOG) corrective regimen sliding scale   SubCutaneous at bedtime    MEDICATIONS  (PRN):  dextrose 40% Gel 15 Gram(s) Oral once PRN Blood Glucose LESS THAN 70 milliGRAM(s)/deciliter  glucagon  Injectable 1 milliGRAM(s) IntraMuscular once PRN Glucose LESS THAN 70 milligrams/deciliter      LABS:                        7.9    17.61 )-----------( 388      ( 16 Jun 2020 12:23 )             26.1     Hgb Trend: 7.9<--, 8.5<--  06-16    139  |  106  |  11  ----------------------------<  124<H>  4.3   |  27  |  0.92    Ca    8.7      16 Jun 2020 10:53    TPro  6.5  /  Alb  2.3<L>  /  TBili  0.3  /  DBili  x   /  AST  10<L>  /  ALT  12  /  AlkPhos  108  06-16    Creatinine Trend: 0.92<--, 1.00<--, 0.92<--, 0.95<--  PT/INR - ( 16 Jun 2020 10:53 )   PT: 17.6 sec;   INR: 1.55 ratio         PTT - ( 16 Jun 2020 10:53 )  PTT:35.2 sec          MICROBIOLOGY:     RADIOLOGY:  [ ] Reviewed and interpreted by me    EKG:        Mika ANP-BC (ext. [id number])

## 2020-06-16 NOTE — CONSULT NOTE ADULT - ASSESSMENT
49m CAD s/p BMS to RCA (8/2019), RUL subsegmental PE (6/2019, on Eliquis), hx of NSTEMI and PAF s/p ex-lap omentopexy and plication for perforated antral ulcer (5/2019),  CVA s/p tPA (8/2019), HTN and DM2, osteomyelitis s/p debridement.  s/p left tma 5/2020, without complication.    Reports that for the last month he has been bleeding from his wound.  He has several units of blood transfused two days ago.  He went to wound care today and his wound was felt to be foul smelling/draining.  He was referred to the er for eval and admission.    In the er his bp was initially marginal.  He dropped his bp to 90/40, and had two episodes of bleeding from the wound, which was wrapped with a pressure dressing.    The patient reports no new symptoms.  Denies chest discomfort and shortness of breath.  No abdominal pain.  No new neurologic symptoms.    -there is no evidence of acute ischemia.  -known cad  -cont asa  -had been on plavix though this is now off he reports, with asa and ac  -cont statin    -there is no evidence of significant arrhythmia.  -hx of paf and pe, on ac  -can hold ac if needed for planned procedure  -is anemic, though this likely reflects his ongoing blood loss from his wound, and not a reason to avoid ac going forward    -has been on metoprolol, which should be continued if his bp allows, with hold parameters  -hold losartan and isdn given that his bp is marginal    -there is no evidence for meaningful  volume overload.  -mildly edematous though this may be multifactorial  -known severe lvh with normal lvef  -watch for hfpef      -await plan from podiatry/wound care    -monitor electrolytes, keep k>4, Mg>2     The patient is at risk of abrupt decompensation.  I have personally provided  35 minutes of critical care time, excluding time spent on separate procedures. 49m CAD s/p BMS to RCA (8/2019), RUL subsegmental PE (6/2019, on Eliquis), hx of NSTEMI and PAF s/p ex-lap omentopexy and plication for perforated antral ulcer (5/2019),  CVA s/p tPA (8/2019), HTN and DM2, osteomyelitis s/p debridement.  s/p left tma 5/2020, without complication.    Reports that for the last month he has been bleeding from his wound.  He has several units of blood transfused two days ago.  He went to wound care today and his wound was felt to be foul smelling/draining.  He was referred to the er for eval and admission.    In the er his bp was initially marginal.  He dropped his bp to 90/40, and had two episodes of bleeding from the wound, which was wrapped with a pressure dressing.  He developed chest disomfort.  He is getting fluids and blood has been ordered       -there was initially no evidence of acute ischemia.  -known cad, with bms to prox rca 8/2019, and an occluded rpls not intervened upon  -cont asa  -had been on plavix though this is now off he reports, with asa and ac  -cont statin  -ischemia in this setting is demand, related to hypotension and anemia.  would not given ntg in this setting    -there is no evidence of significant arrhythmia.  -hx of paf and pe, on ac  -can hold ac if needed for planned procedure  -is anemic, though this likely reflects his ongoing blood loss from his wound, and not a reason to avoid ac going forward    -has been on metoprolol, which should be continued if his bp allows, with hold parameters  -hold losartan and isdn given that his bp is marginal    -there is no evidence for meaningful  volume overload.  -mildly edematous though this may be multifactorial  -known severe lvh with normal lvef  -watch for hfpef      -await plan from podiatry/wound care    -monitor electrolytes, keep k>4, Mg>2     The patient is at risk of abrupt decompensation.  I have personally provided  75 minutes of critical care time, excluding time spent on separate procedures. ICU was called in consultation 49m CAD s/p BMS to RCA (8/2019), RUL subsegmental PE (6/2019, on Eliquis), hx of NSTEMI and PAF s/p ex-lap omentopexy and plication for perforated antral ulcer (5/2019),  CVA s/p tPA (8/2019), HTN and DM2, osteomyelitis s/p debridement.  s/p left tma 5/2020, without complication.    Reports that for the last month he has been bleeding from his wound.  He has several units of blood transfused two days ago.  He went to wound care today and his wound was felt to be foul smelling/draining.  He was referred to the er for eval and admission.    In the er his bp was initially marginal.  He dropped his bp to 90/40, and had two episodes of bleeding from the wound, which was wrapped with a pressure dressing.  He developed chest disomfort.  He is getting fluids and blood has been ordered       -there was initially no evidence of acute ischemia.  -known cad, with bms to prox rca 8/2019, and an occluded rpls not intervened upon  -cont asa  -had been on plavix though this is now off he reports, with asa and ac  -cont statin  -ischemia in this setting is demand, related to hypotension and anemia.  would not given ntg in this setting  -cycle ce and ekgs	    -there is no evidence of significant arrhythmia.  -hx of paf and pe, on ac  -can hold ac if needed for planned procedure  -is anemic, though this likely reflects his ongoing blood loss from his wound, and not a reason to avoid ac going forward    -has been on metoprolol, which should be continued if his bp allows, with hold parameters  -hold losartan and isdn given that his bp is marginal    -there is no evidence for meaningful  volume overload.  -mildly edematous though this may be multifactorial  -known severe lvh with normal lvef  -watch for hfpef      -await plan from podiatry/wound care    -monitor electrolytes, keep k>4, Mg>2     The patient is at risk of abrupt decompensation.  I have personally provided  75 minutes of critical care time, excluding time spent on separate procedures. ICU was called in consultation

## 2020-06-16 NOTE — ED PROVIDER NOTE - TEMPLATE, MLM
Single, no children, father and mother  but can not live with either. Aunt and Uncle involved but unable to stay with them as well.  Family history +for mental illness and substance abuse Communicable/Infectious

## 2020-06-16 NOTE — CONSULT NOTE ADULT - ASSESSMENT
Assessment: 49 y.o male with left foot TMA wound.      Plan:     Pt seen and evaluated with Dr. Todd.  Planned Procedure:  Surgical Management of left TMA non-healing wound via revisional TMA with cutting and removal of skin, soft tissue and bone as necessary. All risks, benefits and complications were discussed and all questions were answered to the patients satisfaction. No promises were made. No guarantees were given.      Pt for OR today due to amount of patient bleeding.

## 2020-06-16 NOTE — CONSULT NOTE ADULT - ASSESSMENT
49year old Male seen at [facility] ED for left foot TMA wound sp amputation with clean margins, now painful and foul smelling.

## 2020-06-16 NOTE — BRIEF OPERATIVE NOTE - COMMENTS
Pt tolerated the procedure and anesthesia well. Pt was brought from the OR to the PACU with all VSS and NVS intact.

## 2020-06-16 NOTE — BRIEF OPERATIVE NOTE - SPECIMENS
Patient is calling regarding a  tooth ache for over a week.    Cant get into a dentist for awhile.     Requests antibiotics .  Pharmacy verified.     If not available, patient is okay with a detailed voicemail no       Left foot metatarsal bone path, left foot metatarsal bone cx, left foot metatarsal CM

## 2020-06-16 NOTE — H&P ADULT - PROBLEM SELECTOR PLAN 3
Continue metoprolol for rate control  hold AC for now  cardio consult hold AC for now  cardio consult

## 2020-06-16 NOTE — ED ADULT NURSE NOTE - NSIMPLEMENTINTERV_GEN_ALL_ED
Implemented All Fall with Harm Risk Interventions:  [place] to call system. Call bell, personal items and telephone within reach. Instruct patient to call for assistance. Room bathroom lighting operational. Non-slip footwear when patient is off stretcher. Physically safe environment: no spills, clutter or unnecessary equipment. Stretcher in lowest position, wheels locked, appropriate side rails in place. Provide visual cue, wrist band, yellow gown, etc. Monitor gait and stability. Monitor for mental status changes and reorient to person, place, and time. Review medications for side effects contributing to fall risk. Reinforce activity limits and safety measures with patient and family. Provide visual clues: red socks.

## 2020-06-16 NOTE — ED PROVIDER NOTE - PHYSICAL EXAMINATION
L foot distal amputation with large dressing and some bleeding through bandage (awaiting podiatry to take down dressing due to post op bleeding)

## 2020-06-16 NOTE — H&P ADULT - PROBLEM SELECTOR PLAN 1
admit  pan-culture  iv abx  May proceed to OR for emergent debridement  ID/podiatry consults  transfuse 1 unit prbc admit  pan-culture  iv abx  May proceed to OR for emergent debridement and hemastasis  ID/podiatry consults  transfuse 1 unit prbc

## 2020-06-16 NOTE — ED PROVIDER NOTE - ENMT, MLM
Airway patent, Nasal mucosa clear. Mouth with normal mucosa. Throat has no vesicles, no oropharyngeal exudates and uvula is midline. MM moist. neck supple. non-toxic, well appearing.

## 2020-06-16 NOTE — CONSULT NOTE ADULT - SUBJECTIVE AND OBJECTIVE BOX
HPI:  49year old Male seen at [facility] ED for left foot TMA wound sp amputation with clean margins. Pt was seen in wound care by Dr. Hale  for his left foot TMA (DOS 5.20.2020) Pt relates he went to [facility] last week for the wound and states that the wound was packed and redressed. Pt presented today and wound was painful and foul smelling.     Denies any fever, chills, nausea, vomiting, chest pain, shortness of breath, or calf pain at this time. (16 Jun 2020 12:13)      PAST MEDICAL & SURGICAL HISTORY:  Cerebrovascular accident  CAD S/P percutaneous coronary angioplasty  Osteomyelitis: s/p debridement  History of non-ST elevation myocardial infarction (NSTEMI)  Pulmonary embolism  Perforated gastric ulcer: s/p emergent ex-lap omentopexy and plication 6/2019  BPH (benign prostatic hyperplasia)  Hypertension  Afib  Diabetes mellitus with no complication  Diabetes  Traumatic amputation of left foot, initial encounter  Perforated gastric ulcer  H/O abdominal surgery      Antimicrobials      Immunological      Other  dextrose 40% Gel 15 Gram(s) Oral once PRN  dextrose 5%. 1000 milliLiter(s) IV Continuous <Continuous>  dextrose 50% Injectable 12.5 Gram(s) IV Push once  dextrose 50% Injectable 25 Gram(s) IV Push once  dextrose 50% Injectable 25 Gram(s) IV Push once  glucagon  Injectable 1 milliGRAM(s) IntraMuscular once PRN  insulin lispro (HumaLOG) corrective regimen sliding scale   SubCutaneous three times a day before meals  insulin lispro (HumaLOG) corrective regimen sliding scale   SubCutaneous at bedtime  sodium chloride 0.9% Bolus 1000 milliLiter(s) IV Bolus every 1 hour      Allergies    fish (Hives)  No Known Drug Allergies    Intolerances        SOCIAL HISTORY:  Social History:      FAMILY HISTORY:  FH: stroke: Father  FH: coronary artery disease: Father  FH: pulmonary embolism: Mother      ROS:    EYES:  Negative  blurry vision or double vision  GASTROINTESTINAL:  Negative for nausea, vomiting, diarrhea  -otherwise negative except for subjective    Vital Signs Last 24 Hrs  T(C): 37.1 (16 Jun 2020 12:35), Max: 37.2 (16 Jun 2020 09:32)  T(F): 98.8 (16 Jun 2020 12:35), Max: 99 (16 Jun 2020 09:32)  HR: 64 (16 Jun 2020 12:39) (57 - 79)  BP: 80/45 (16 Jun 2020 12:39) (60/37 - 109/67)  BP(mean): --  RR: 16 (16 Jun 2020 12:39) (16 - 18)  SpO2: 94% (16 Jun 2020 12:39) (94% - 96%)    PE:  WDWN in no distress  HEENT:  NC, PERRL, sclerae anicteric, conjunctivae clear, EOMI.   Neck:  Supple, no adenopathy  Lungs:  No accessory muscle use, breathing comfortably  Cor:  distant  Abd:  Symmetric, appears normal  Extrem: left foot win large dressing  Neuro: grossly intact  Musc: moving all limbs freely, no focal deficits    LABS:                        7.9    17.61 )-----------( 388      ( 16 Jun 2020 12:23 )             26.1       WBC Count: 17.61 K/uL (06-16-20 @ 12:23)  WBC Count: 9.14 K/uL (06-16-20 @ 10:53)      06-16    139  |  106  |  11  ----------------------------<  124<H>  4.3   |  27  |  0.92    Ca    8.7      16 Jun 2020 10:53    TPro  6.5  /  Alb  2.3<L>  /  TBili  0.3  /  DBili  x   /  AST  10<L>  /  ALT  12  /  AlkPhos  108  06-16      Creatinine, Serum: 0.92 mg/dL (06-16-20 @ 10:53)    MICROBIOLOGY:      RADIOLOGY & ADDITIONAL STUDIES:    --

## 2020-06-16 NOTE — ED PROVIDER NOTE - OBJECTIVE STATEMENT
48 yo M p/w L foot bleeding and on / off infxn x past ~ 3 weeks sp discharge from recent surg. no fever/chills. no cp/sob/palp. no weakness / dizziness. no numb/ting/focal weak. no recent trauma. no recent illness. no agg/allev factors. no other inj or co.

## 2020-06-16 NOTE — CONSULT NOTE ADULT - SUBJECTIVE AND OBJECTIVE BOX
Pt is a 48 yo male with h/o A-fib, poorly controlled DM, diabetic neuropathy BLE, HTN, CAD (s/p RCA angioplasty 8/19), CVA, cerebral aneurysm x 2 (s/p coiling/stenting), NSTEMI, perforated gastric ulcer (s/p omentopexy 6/19), bilat PE 6/19, on AC (Eliquis, ASA), and prior left foot OM (s/p great toe amputation) who underwent Left foot transmetatarsal amputation on 5/20/20 with podiatry. Post-operatively, pt has been in [facility] since and has been undergoing wound packing and redressing. Pt was seen today by Dr. Hale in wound care and had evidence of pain, found smelling wound and bleeding. Pt reports he takes Eliquis and ASA81 and last took this am.    PAST MEDICAL & SURGICAL HISTORY:  Cerebrovascular accident  CAD S/P percutaneous coronary angioplasty  Osteomyelitis: s/p debridement  History of non-ST elevation myocardial infarction (NSTEMI)  Pulmonary embolism  Perforated gastric ulcer: s/p emergent ex-lap omentopexy and plication 6/2019  BPH (benign prostatic hyperplasia)  Hypertension  Afib  Diabetes mellitus with no complication  Diabetes  Traumatic amputation of left foot, initial encounter  Perforated gastric ulcer  H/O abdominal surgery      FAMILY HISTORY:  FH: stroke: Father  FH: coronary artery disease: Father  FH: pulmonary embolism: Mother      ( )Tobacco  ( )Etoh  ( )Drugs    fish (Hives)  No Known Drug Allergies      Home Medications:  Admelog SoloStar 100 units/mL injectable solution: Inject subcutaneously by sliding scale as per prescriber&#x27;s instructions. (16 Jun 2020 11:51)  apixaban 5 mg oral tablet: 1 tab(s) orally 2 times a day (16 Jun 2020 11:51)  aspirin 81 mg oral tablet, chewable: 1 tab(s) orally once a day (16 Jun 2020 11:51)  atorvastatin 40 mg oral tablet: 1 tab(s) orally once a day (16 Jun 2020 11:51)  Basaglar KwikPen 100 units/mL subcutaneous solution: 10 unit(s) subcutaneous 2 times a day (16 Jun 2020 11:51)  bethanechol 25 mg oral tablet: 1 tab(s) orally 3 times a day (16 Jun 2020 11:51)  Colace 100 mg oral capsule: 3 cap(s) orally once a day (at bedtime) (16 Jun 2020 11:51)  Dulcolax Laxative 10 mg rectal suppository: 1 suppository(ies) rectal once a day, As Needed (16 Jun 2020 11:51)  famotidine 20 mg oral tablet: 1 tab(s) orally once a day (in the morning) (16 Jun 2020 11:51)  ferrous sulfate 325 mg (65 mg elemental iron) oral tablet: 1 tab(s) orally 2 times a day (16 Jun 2020 11:51)  Fleet Enema 7 g-19 g rectal enema: 133 milliliter(s) rectal once, As Needed (16 Jun 2020 11:51)  Flomax 0.4 mg oral capsule: 2 cap(s) orally once a day (in the evening) (16 Jun 2020 11:51)  Imdur 30 mg oral tablet, extended release: 1 tab(s) orally once a day (in the morning) (16 Jun 2020 11:51)  losartan 25 mg oral tablet: 1 tab(s) orally once a day (16 Jun 2020 11:51)  metFORMIN 1000 mg oral tablet: 1 tab(s) orally 2 times a day with morning and evening meals (16 Jun 2020 11:51)  methIMAzole 10 mg oral tablet: 1 tab(s) orally once a day (16 Jun 2020 11:51)  metoprolol succinate 50 mg oral tablet, extended release: 1 tab(s) orally once a day (16 Jun 2020 11:51)  Milk of Magnesia 8% oral suspension: 30 milliliter(s) orally once a day (at bedtime), As Needed (16 Jun 2020 11:51)  multivitamin: 1 tab(s) orally once a day (16 Jun 2020 11:51)  ondansetron 4 mg oral tablet: 1 tab(s) orally every 8 hours, As Needed - for nausea (16 Jun 2020 11:51)  oxyCODONE 10 mg oral tablet: 1 tab(s) orally every 6 hours, As Needed (16 Jun 2020 11:51)  pantoprazole 40 mg oral delayed release tablet: 1 tab(s) orally once a day (16 Jun 2020 11:51)  Senna 8.6 mg oral tablet: 2 tab(s) orally once a day (at bedtime) (16 Jun 2020 11:51)  sucralfate 1 g oral tablet: 1 tab(s) orally 4 times a day (before meals and at bedtime) (16 Jun 2020 11:51)  traMADol 50 mg oral tablet: 0.5 tab(s) orally every 6 hours, As Needed (16 Jun 2020 11:51)  Tylenol 325 mg oral tablet: 2 tab(s) orally every 6 hours, As Needed (16 Jun 2020 11:51)  Vitamin C 1000 mg oral tablet: 1 tab(s) orally once a day (16 Jun 2020 11:51)      MEDICATIONS  (STANDING):  ceFAZolin   IVPB      ceFAZolin   IVPB 2000 milliGRAM(s) IV Intermittent every 8 hours  dextrose 5%. 1000 milliLiter(s) (50 mL/Hr) IV Continuous <Continuous>  dextrose 50% Injectable 12.5 Gram(s) IV Push once  dextrose 50% Injectable 25 Gram(s) IV Push once  dextrose 50% Injectable 25 Gram(s) IV Push once  insulin lispro (HumaLOG) corrective regimen sliding scale   SubCutaneous three times a day before meals  insulin lispro (HumaLOG) corrective regimen sliding scale   SubCutaneous at bedtime    MEDICATIONS  (PRN):  dextrose 40% Gel 15 Gram(s) Oral once PRN Blood Glucose LESS THAN 70 milliGRAM(s)/deciliter  glucagon  Injectable 1 milliGRAM(s) IntraMuscular once PRN Glucose LESS THAN 70 milligrams/deciliter    REVIEW OF SYSTEM:  CONSTITUTIONAL: No weakness, fevers or chills  RESPIRATORY: No cough, wheezing, hemoptysis; (+) Subjective SOB and CP per patient  CARDIOVASCULAR: No chest pain or palpitations  GASTROINTESTINAL: No abdominal or epigastric pain. No nausea, vomiting, or hematemesis; No diarrhea or constipation. No melena or hematochezia.  GENITOURINARY: Pt has chronic jacques for neurogenic bladder  SKIN: No itching, burning, rashes, or lesions   All other review of systems is negative unless indicated above.    Vital Signs Last 24 Hrs  T(C): 37.1 (16 Jun 2020 12:35), Max: 37.2 (16 Jun 2020 09:32)  T(F): 98.8 (16 Jun 2020 12:35), Max: 99 (16 Jun 2020 09:32)  HR: 64 (16 Jun 2020 12:39) (57 - 79)  BP: 80/45 (16 Jun 2020 12:39) (60/37 - 109/67)  BP(mean): --  RR: 16 (16 Jun 2020 12:39) (16 - 18)  SpO2: 94% (16 Jun 2020 12:39) (94% - 96%)    PHYSICAL EXAM:  GENERAL: NAD, well-groomed, well-developed  HEAD:  Atraumatic, Normocephalic  HEART: Regular rate and rhythm; No murmurs, rubs, or gallops  RESPIRATORY: CTA B/L, No W/R/R  ABDOMEN: Soft, Nontender, Nondistended;Midline well healed scar from Ex-lap 2019 for perforated ulcer. Bowel sounds present  NEUROLOGY: A&Ox3  EXTREMITIES:    RLE- (+) multiple dark discolored flat lesions to anterior Left shin.   Decreased sensation to light touch secondary to DM neuropathy  1(+) DP and 1(+) PT palpable  Motor inact  onychomycosis to all toes    LLE- Left foot with compression dressing in place with towel and ACE bandage- min sanguinous drainage to dressing/  Unable to assess pulses due to dressing/  Calves soft bilaterally, compartments soft.                  7.9    17.61 )-----------( 388      ( 16 Jun 2020 12:23 )             26.1       06-16    139  |  106  |  11  ----------------------------<  124<H>  4.3   |  27  |  0.92    Ca    8.7      16 Jun 2020 10:53    TPro  6.5  /  Alb  2.3<L>  /  TBili  0.3  /  DBili  x   /  AST  10<L>  /  ALT  12  /  AlkPhos  108  06-16      PT/INR - ( 16 Jun 2020 10:53 )   PT: 17.6 sec;   INR: 1.55 ratio         PTT - ( 16 Jun 2020 10:53 )  PTT:35.2 sec    LIVER FUNCTIONS - ( 16 Jun 2020 10:53 )  Alb: 2.3 g/dL / Pro: 6.5 g/dL / ALK PHOS: 108 U/L / ALT: 12 U/L / AST: 10 U/L / GGT: x             Radiology Findings:   < from: [facility] Physiol Extremity Lower 3+ Level, BI (05.19.20 @ 15:16) >  EXAM:  PHYSIOL EXTREM LOW 3+ LEV BI                            PROCEDURE DATE:  05/19/2020    INTERPRETATION:  Clinical information: History smoking, diabetes, hypertension, hyperlipidemia, presents with left foot ulcer.    Comparison: None available.    Technique: Lower extremity arterial Doppler study. Left toe brachial index and left digital PVR are unobtainable due to ulceration.    Findings: Ankle brachial index measures 1.16 bilaterally. No segmental arterial pressure gradient is detected. Toe brachial index measures 1.67 on the right.    PVR waveforms are normal in amplitude and pulsatility bilaterally.    Impression: No Doppler evidence of hemodynamically significant arterial flow limitation in either lower extremity.    < from: [facility] Duplex Low Ext Arterial, Ltd, Left (05.19.20 @ 15:16) >  PROCEDURE DATE:  05/19/2020          INTERPRETATION:  Clinical information: History of smoking, diabetes, hypertension, hyperlipidemia, presents with left foot ulcer.    Comparison: Lower extremity arterial Doppler study dated 5/19/2020.    Technique: Left lower extremity arterial duplex study.    Findings: Ankle brachial index measures 1.16 bilaterally.    Duplex evaluation of the distributive arteries of the left lower extremitywas performed. The arteries are patent. Mild intimal plaque is noted. Triphasic arterial waveforms are present in the upper thigh. Monophasic waveforms are present in the lower thigh and calf. No disturbed color-flow or elevated blood flow velocitiesare detected.    Peak systolic velocities are as follows:    Left lower extremity    Common femoral artery 107 cm/s  Superficial femoral artery proximal 101 cm/s, mid 96 cm/s, distal 81 cm/s  Popliteal artery 99 cm/s  Posterior tibial artery 62 cm/s  Anterior tibial artery 90 cm/s  Peroneal artery not visualized  Dorsalis pedis artery 57 cm/s    Impression: No duplex evidence of hemodynamically significant stenosis or occlusion in the arteries of the left lower extremity.      < end of copied text >      49 Y.O. M presents to ED with Left TMA wound foul smelling drainage, pain and bleeding after being seen in Wound care for follow up of Left TMA postop wound from 5/20/20. Pt with no evidence of vascular compromise on non-invasive vascular studies (AURELIA and Arterial Dopplers) performed on 5/12/20. Pt on dual AC with Eliquis and ASA81 and took  both this am and now with acute blood loss anemia after bed-side debridement in wound care clinic and evidence of infection with elevated lactate, WC 17, foul -smelling discharge likely requiring further debridement and hemostasis in OR.    -Discussed with Dr. Silva, Dressing intact, no evidence of active bleeding through compressive dressing, H/H 7.9/26.1- receiving IVF bolus and 2 unit PRBC's  -No vascular intervention required at this point, continue compression, elevation and PRBC's for ABLA per ICU.  -Should patient be taken to OR for debridement and need for further hemostasis not achieved by bedside measure , Please contact vascular surgery.

## 2020-06-16 NOTE — CHART NOTE - NSCHARTNOTEFT_GEN_A_CORE
Patient seen briefly, full consult note to follow   Recent L TMA   Started bleeding from surgical site after wound care today  On aspirin for CAD and Eliquis for A.fib, took both this morning   C/o dizziness, lightheadedness, chest pain and SOB   SBP 70s, HR 50s (on BB)   Looks extremely pale   AAOx3  EKG with sinus johnny, no ischemic changes   Plan:   Admit to ICU  2 PRBCs ordered by ED  Order 1 Plt   Order Kcentra   Hold AC, antiplatelets   Keep NPO   For OR later today when stabilized   Patient updated at bedside Patient seen briefly, full consult note to follow   Recent L TMA   Started bleeding from surgical site after wound care today  On aspirin for CAD and Eliquis for A.fib, took both this morning   C/o dizziness, lightheadedness, chest pain and SOB   SBP 70s, HR 50s (on BB)   Looks extremely pale   AAOx3  EKG with sinus johnny, no ischemic changes     Imp:   Surgical site bleeding   Acute blood loss anemia  Hypotension    Plan:   Admit to ICU  2 PRBCs ordered by ED  Order 1 Plt   Order Kcentra   Hold AC, antiplatelets   Keep NPO   For OR later today when stabilized   Patient updated at bedside Patient seen briefly, full consult note to follow   Recent L TMA   Started bleeding from surgical site after wound care today  On aspirin for CAD and Eliquis for A.fib, took both this morning   C/o dizziness, lightheadedness, chest pain and SOB   SBP 70s, HR 50s (on BB)   Looks extremely pale   AAOx3  EKG with sinus johnny, no ischemic changes     Imp:   Surgical site bleeding   Acute blood loss anemia  Hypotension    Plan:   Admit to ICU  2 PRBCs ordered by ED  Order 1 Plt   Order Kcentra   Hold AC, antiplatelets   Keep NPO   For OR later today when stabilized   Patient updated at bedside    Patient critically ill, 35 mins spent

## 2020-06-17 DIAGNOSIS — N40.1 BENIGN PROSTATIC HYPERPLASIA WITH LOWER URINARY TRACT SYMPTOMS: ICD-10-CM

## 2020-06-17 DIAGNOSIS — Z79.01 LONG TERM (CURRENT) USE OF ANTICOAGULANTS: ICD-10-CM

## 2020-06-17 DIAGNOSIS — L97.524 NON-PRESSURE CHRONIC ULCER OF OTHER PART OF LEFT FOOT WITH NECROSIS OF BONE: ICD-10-CM

## 2020-06-17 DIAGNOSIS — Z79.4 LONG TERM (CURRENT) USE OF INSULIN: ICD-10-CM

## 2020-06-17 DIAGNOSIS — Z87.440 PERSONAL HISTORY OF URINARY (TRACT) INFECTIONS: ICD-10-CM

## 2020-06-17 DIAGNOSIS — E87.6 HYPOKALEMIA: ICD-10-CM

## 2020-06-17 DIAGNOSIS — Z89.432 ACQUIRED ABSENCE OF LEFT FOOT: ICD-10-CM

## 2020-06-17 DIAGNOSIS — Z79.82 LONG TERM (CURRENT) USE OF ASPIRIN: ICD-10-CM

## 2020-06-17 DIAGNOSIS — I69.351 HEMIPLEGIA AND HEMIPARESIS FOLLOWING CEREBRAL INFARCTION AFFECTING RIGHT DOMINANT SIDE: ICD-10-CM

## 2020-06-17 DIAGNOSIS — E11.621 TYPE 2 DIABETES MELLITUS WITH FOOT ULCER: ICD-10-CM

## 2020-06-17 DIAGNOSIS — E11.40 TYPE 2 DIABETES MELLITUS WITH DIABETIC NEUROPATHY, UNSPECIFIED: ICD-10-CM

## 2020-06-17 DIAGNOSIS — E56.9 VITAMIN DEFICIENCY, UNSPECIFIED: ICD-10-CM

## 2020-06-17 DIAGNOSIS — Z79.899 OTHER LONG TERM (CURRENT) DRUG THERAPY: ICD-10-CM

## 2020-06-17 DIAGNOSIS — Z91.013 ALLERGY TO SEAFOOD: ICD-10-CM

## 2020-06-17 DIAGNOSIS — M86.9 OSTEOMYELITIS, UNSPECIFIED: ICD-10-CM

## 2020-06-17 DIAGNOSIS — I25.2 OLD MYOCARDIAL INFARCTION: ICD-10-CM

## 2020-06-17 DIAGNOSIS — K59.00 CONSTIPATION, UNSPECIFIED: ICD-10-CM

## 2020-06-17 DIAGNOSIS — D64.9 ANEMIA, UNSPECIFIED: ICD-10-CM

## 2020-06-17 DIAGNOSIS — Z87.891 PERSONAL HISTORY OF NICOTINE DEPENDENCE: ICD-10-CM

## 2020-06-17 LAB
ANION GAP SERPL CALC-SCNC: 8 MMOL/L — SIGNIFICANT CHANGE UP (ref 5–17)
APPEARANCE UR: ABNORMAL
BACTERIA # UR AUTO: ABNORMAL
BASOPHILS # BLD AUTO: 0.03 K/UL — SIGNIFICANT CHANGE UP (ref 0–0.2)
BASOPHILS NFR BLD AUTO: 0.3 % — SIGNIFICANT CHANGE UP (ref 0–2)
BILIRUB UR-MCNC: NEGATIVE — SIGNIFICANT CHANGE UP
BUN SERPL-MCNC: 12 MG/DL — SIGNIFICANT CHANGE UP (ref 7–23)
CALCIUM SERPL-MCNC: 7.9 MG/DL — LOW (ref 8.5–10.1)
CHLORIDE SERPL-SCNC: 107 MMOL/L — SIGNIFICANT CHANGE UP (ref 96–108)
CO2 SERPL-SCNC: 25 MMOL/L — SIGNIFICANT CHANGE UP (ref 22–31)
COLOR SPEC: YELLOW — SIGNIFICANT CHANGE UP
COMMENT - URINE: SIGNIFICANT CHANGE UP
CREAT SERPL-MCNC: 0.79 MG/DL — SIGNIFICANT CHANGE UP (ref 0.5–1.3)
CRP SERPL-MCNC: 4.07 MG/DL — HIGH (ref 0–0.4)
DIFF PNL FLD: ABNORMAL
EOSINOPHIL # BLD AUTO: 0.14 K/UL — SIGNIFICANT CHANGE UP (ref 0–0.5)
EOSINOPHIL NFR BLD AUTO: 1.3 % — SIGNIFICANT CHANGE UP (ref 0–6)
EPI CELLS # UR: SIGNIFICANT CHANGE UP
GLUCOSE SERPL-MCNC: 128 MG/DL — HIGH (ref 70–99)
GLUCOSE UR QL: NEGATIVE — SIGNIFICANT CHANGE UP
GRAM STN FLD: SIGNIFICANT CHANGE UP
HCT VFR BLD CALC: 23.8 % — LOW (ref 39–50)
HCT VFR BLD CALC: 26.7 % — LOW (ref 39–50)
HGB BLD-MCNC: 7.5 G/DL — LOW (ref 13–17)
HGB BLD-MCNC: 8.5 G/DL — LOW (ref 13–17)
IMM GRANULOCYTES NFR BLD AUTO: 0.7 % — SIGNIFICANT CHANGE UP (ref 0–1.5)
INR BLD: 1.38 RATIO — HIGH (ref 0.88–1.16)
KETONES UR-MCNC: ABNORMAL
LACTATE SERPL-SCNC: 0.9 MMOL/L — SIGNIFICANT CHANGE UP (ref 0.7–2)
LEUKOCYTE ESTERASE UR-ACNC: ABNORMAL
LYMPHOCYTES # BLD AUTO: 1.36 K/UL — SIGNIFICANT CHANGE UP (ref 1–3.3)
LYMPHOCYTES # BLD AUTO: 12.7 % — LOW (ref 13–44)
MAGNESIUM SERPL-MCNC: 1.8 MG/DL — SIGNIFICANT CHANGE UP (ref 1.6–2.6)
MCHC RBC-ENTMCNC: 27.9 PG — SIGNIFICANT CHANGE UP (ref 27–34)
MCHC RBC-ENTMCNC: 31.5 GM/DL — LOW (ref 32–36)
MCV RBC AUTO: 88.5 FL — SIGNIFICANT CHANGE UP (ref 80–100)
MONOCYTES # BLD AUTO: 0.93 K/UL — HIGH (ref 0–0.9)
MONOCYTES NFR BLD AUTO: 8.7 % — SIGNIFICANT CHANGE UP (ref 2–14)
NEUTROPHILS # BLD AUTO: 8.15 K/UL — HIGH (ref 1.8–7.4)
NEUTROPHILS NFR BLD AUTO: 76.3 % — SIGNIFICANT CHANGE UP (ref 43–77)
NITRITE UR-MCNC: NEGATIVE — SIGNIFICANT CHANGE UP
NRBC # BLD: 0 /100 WBCS — SIGNIFICANT CHANGE UP (ref 0–0)
PH UR: 6 — SIGNIFICANT CHANGE UP (ref 5–8)
PHOSPHATE SERPL-MCNC: 2.9 MG/DL — SIGNIFICANT CHANGE UP (ref 2.5–4.5)
PLATELET # BLD AUTO: 327 K/UL — SIGNIFICANT CHANGE UP (ref 150–400)
POTASSIUM SERPL-MCNC: 3.7 MMOL/L — SIGNIFICANT CHANGE UP (ref 3.5–5.3)
POTASSIUM SERPL-SCNC: 3.7 MMOL/L — SIGNIFICANT CHANGE UP (ref 3.5–5.3)
PREALB SERPL-MCNC: 9 MG/DL — LOW (ref 20–40)
PROT UR-MCNC: 100
PROTHROM AB SERPL-ACNC: 15.6 SEC — HIGH (ref 10–12.9)
RBC # BLD: 2.69 M/UL — LOW (ref 4.2–5.8)
RBC # FLD: 15.5 % — HIGH (ref 10.3–14.5)
RBC CASTS # UR COMP ASSIST: SIGNIFICANT CHANGE UP /HPF (ref 0–4)
SODIUM SERPL-SCNC: 140 MMOL/L — SIGNIFICANT CHANGE UP (ref 135–145)
SP GR SPEC: 1.01 — SIGNIFICANT CHANGE UP (ref 1.01–1.02)
SPECIMEN SOURCE: SIGNIFICANT CHANGE UP
UROBILINOGEN FLD QL: NEGATIVE — SIGNIFICANT CHANGE UP
WBC # BLD: 10.68 K/UL — HIGH (ref 3.8–10.5)
WBC # FLD AUTO: 10.68 K/UL — HIGH (ref 3.8–10.5)
WBC UR QL: >50

## 2020-06-17 PROCEDURE — 99024 POSTOP FOLLOW-UP VISIT: CPT

## 2020-06-17 PROCEDURE — 99233 SBSQ HOSP IP/OBS HIGH 50: CPT | Mod: GC

## 2020-06-17 PROCEDURE — 99291 CRITICAL CARE FIRST HOUR: CPT

## 2020-06-17 PROCEDURE — 93010 ELECTROCARDIOGRAM REPORT: CPT

## 2020-06-17 RX ORDER — FENTANYL CITRATE 50 UG/ML
25 INJECTION INTRAVENOUS ONCE
Refills: 0 | Status: DISCONTINUED | OUTPATIENT
Start: 2020-06-17 | End: 2020-06-17

## 2020-06-17 RX ORDER — OXYCODONE AND ACETAMINOPHEN 5; 325 MG/1; MG/1
2 TABLET ORAL EVERY 6 HOURS
Refills: 0 | Status: DISCONTINUED | OUTPATIENT
Start: 2020-06-17 | End: 2020-06-17

## 2020-06-17 RX ORDER — PHENAZOPYRIDINE HCL 100 MG
100 TABLET ORAL EVERY 8 HOURS
Refills: 0 | Status: DISCONTINUED | OUTPATIENT
Start: 2020-06-17 | End: 2020-06-24

## 2020-06-17 RX ORDER — LIDOCAINE HCL 20 MG/ML
2 VIAL (ML) INJECTION ONCE
Refills: 0 | Status: COMPLETED | OUTPATIENT
Start: 2020-06-17 | End: 2020-06-17

## 2020-06-17 RX ORDER — HYDROMORPHONE HYDROCHLORIDE 2 MG/ML
0.5 INJECTION INTRAMUSCULAR; INTRAVENOUS; SUBCUTANEOUS ONCE
Refills: 0 | Status: DISCONTINUED | OUTPATIENT
Start: 2020-06-17 | End: 2020-06-17

## 2020-06-17 RX ORDER — MORPHINE SULFATE 50 MG/1
2 CAPSULE, EXTENDED RELEASE ORAL ONCE
Refills: 0 | Status: DISCONTINUED | OUTPATIENT
Start: 2020-06-17 | End: 2020-06-17

## 2020-06-17 RX ADMIN — Medication 325 MILLIGRAM(S): at 17:39

## 2020-06-17 RX ADMIN — Medication 25 MILLIGRAM(S): at 13:42

## 2020-06-17 RX ADMIN — LOSARTAN POTASSIUM 25 MILLIGRAM(S): 100 TABLET, FILM COATED ORAL at 05:35

## 2020-06-17 RX ADMIN — Medication 100 MILLIGRAM(S): at 21:28

## 2020-06-17 RX ADMIN — MORPHINE SULFATE 2 MILLIGRAM(S): 50 CAPSULE, EXTENDED RELEASE ORAL at 23:12

## 2020-06-17 RX ADMIN — Medication 50 MILLIGRAM(S): at 17:40

## 2020-06-17 RX ADMIN — Medication 2: at 12:00

## 2020-06-17 RX ADMIN — Medication 1 GRAM(S): at 05:37

## 2020-06-17 RX ADMIN — Medication 100 MILLIGRAM(S): at 05:37

## 2020-06-17 RX ADMIN — Medication 50 MILLIGRAM(S): at 05:37

## 2020-06-17 RX ADMIN — Medication 1 GRAM(S): at 23:12

## 2020-06-17 RX ADMIN — SENNA PLUS 2 TABLET(S): 8.6 TABLET ORAL at 21:28

## 2020-06-17 RX ADMIN — ISOSORBIDE MONONITRATE 30 MILLIGRAM(S): 60 TABLET, EXTENDED RELEASE ORAL at 05:37

## 2020-06-17 RX ADMIN — Medication 100 MILLIGRAM(S): at 05:38

## 2020-06-17 RX ADMIN — Medication 1 GRAM(S): at 12:03

## 2020-06-17 RX ADMIN — ATORVASTATIN CALCIUM 40 MILLIGRAM(S): 80 TABLET, FILM COATED ORAL at 21:28

## 2020-06-17 RX ADMIN — TAMSULOSIN HYDROCHLORIDE 0.8 MILLIGRAM(S): 0.4 CAPSULE ORAL at 21:28

## 2020-06-17 RX ADMIN — Medication 1 GRAM(S): at 17:39

## 2020-06-17 RX ADMIN — Medication 25 MILLIGRAM(S): at 05:37

## 2020-06-17 RX ADMIN — Medication 1 TABLET(S): at 12:03

## 2020-06-17 RX ADMIN — PANTOPRAZOLE SODIUM 40 MILLIGRAM(S): 20 TABLET, DELAYED RELEASE ORAL at 05:47

## 2020-06-17 RX ADMIN — HYDROMORPHONE HYDROCHLORIDE 0.5 MILLIGRAM(S): 2 INJECTION INTRAMUSCULAR; INTRAVENOUS; SUBCUTANEOUS at 01:53

## 2020-06-17 RX ADMIN — Medication 1000 MILLIGRAM(S): at 12:04

## 2020-06-17 RX ADMIN — Medication 2 MILLILITER(S): at 02:37

## 2020-06-17 RX ADMIN — Medication 3: at 17:25

## 2020-06-17 RX ADMIN — Medication 1: at 07:58

## 2020-06-17 RX ADMIN — Medication 325 MILLIGRAM(S): at 05:37

## 2020-06-17 RX ADMIN — Medication 25 MILLIGRAM(S): at 21:28

## 2020-06-17 RX ADMIN — FAMOTIDINE 20 MILLIGRAM(S): 10 INJECTION INTRAVENOUS at 05:35

## 2020-06-17 RX ADMIN — Medication 100 MILLIGRAM(S): at 12:03

## 2020-06-17 RX ADMIN — FENTANYL CITRATE 25 MICROGRAM(S): 50 INJECTION INTRAVENOUS at 15:41

## 2020-06-17 RX ADMIN — FENTANYL CITRATE 25 MICROGRAM(S): 50 INJECTION INTRAVENOUS at 08:06

## 2020-06-17 RX ADMIN — Medication 100 MILLIGRAM(S): at 13:43

## 2020-06-17 RX ADMIN — SODIUM CHLORIDE 75 MILLILITER(S): 9 INJECTION, SOLUTION INTRAVENOUS at 05:48

## 2020-06-17 RX ADMIN — Medication 1: at 21:34

## 2020-06-17 NOTE — PROGRESS NOTE ADULT - SUBJECTIVE AND OBJECTIVE BOX
The patient was interviewed and evaluated  49y Male    T(C): 37 (06-17-20 @ 12:27), Max: 38.6 (06-16-20 @ 21:22)  HR: 55 (06-17-20 @ 12:00) (55 - 78)  BP: 102/55 (06-17-20 @ 12:00) (80/45 - 137/63)  RR: 21 (06-17-20 @ 12:00) (15 - 33)  SpO2: 95% (06-17-20 @ 12:00) (91% - 99%)  Wt(kg): --    Pt seen, doing well, no anesthesia complications or complaints noted or reported.   No Nausea    All questions answered    No additional recommendations.     Pain well controlled

## 2020-06-17 NOTE — PROGRESS NOTE ADULT - PROBLEM SELECTOR PLAN 1
sp revisions, prior micro only with MSSA so recommend continued optimal therapy with cefazolin pending any new micro-further recs to follow

## 2020-06-17 NOTE — PROGRESS NOTE ADULT - PROBLEM SELECTOR PLAN 1
admit  pan-culture  iv abx  S/P emergent debridement and hemastasis  ID/podiatry consults  transfuse 1 unit prbc

## 2020-06-17 NOTE — PROGRESS NOTE ADULT - SUBJECTIVE AND OBJECTIVE BOX
Patient is a 49y old  Male who presents with a chief complaint of left foot infection/bleeding (2020 13:37)    24 hour events:   49year old Male CAD s/p BMS to RCA (2019), RUL subsegmental PE (2019, on Eliquis), hx of NSTEMI and PAF s/p ex-lap omentopexy and plication for perforated antral ulcer (2019),  CVA s/p tPA (2019), HTN and DM2, osteomyelitis s/p debridement.  s/p left tma 2020, without complication seen at [facility] ED for left foot TMA wound. Pt was seen in wound care by Dr. Hale today for his left foot TMA. Pt relates he went to [facility] last week for the wound and states that the wound was packed and redressed, and received total of 4U PRBC on 2020 and 2020. Pt presented today and wound was painful and foul smelling. Patient with debridement of wound with podiatry on 2020.    REVIEW OF SYSTEMS  Constitutional: No fever, chills, fatigue  Neuro: No headache, numbness, weakness  Resp: No cough, wheezing, shortness of breath  CVS: No chest pain, palpitations, leg swelling  GI: No abdominal pain, nausea, vomiting, diarrhea   : No dysuria, frequency, incontinence  Skin: No itching, burning, rashes, or lesions   Msk: No joint pain or swelling  Psych: No depression, anxiety, mood swings  Heme: No bleeding    T(F): 98.7 (20 @ 07:35), Max: 101.4 (20 @ 21:22)  HR: 58 (20 @ 07:00) (57 - 79)  BP: 101/53 (20 @ 07:00) (60/37 - 137/63)  RR: 17 (20 @ 07:00) (15 - 33)  SpO2: 95% (20 @ 07:00) (91% - 99%)  Wt(kg): --            I&O's Summary     @ 07:01  -   @ 07:00  --------------------------------------------------------  IN: 2290 mL / OUT: 1800 mL / NET: 490 mL      PHYSICAL EXAM  General:   CNS:   HEENT:   Resp:   CVS:   Abd:   Ext:   Skin:     MEDICATIONS  ceFAZolin   IVPB IV Intermittent    isosorbide   mononitrate ER Tablet (IMDUR) Oral  losartan Oral  metoprolol tartrate Oral  tamsulosin Oral    atorvastatin Oral  dextrose 40% Gel Oral PRN  dextrose 50% Injectable IV Push  dextrose 50% Injectable IV Push  dextrose 50% Injectable IV Push  glucagon  Injectable IntraMuscular PRN  insulin lispro (HumaLOG) corrective regimen sliding scale SubCutaneous  insulin lispro (HumaLOG) corrective regimen sliding scale SubCutaneous  methimazole Oral      ondansetron Injectable IV Push PRN  oxycodone    5 mG/acetaminophen 325 mG Oral PRN        bisacodyl Suppository Rectal PRN  famotidine    Tablet Oral  pantoprazole    Tablet Oral  senna Oral  sucralfate Oral    bethanechol Oral  phenazopyridine Oral    ascorbic acid Oral  dextrose 5%. IV Continuous  ferrous    sulfate Oral  lactated ringers. IV Continuous  multivitamin Oral                                7.5    10.68 )-----------( 327      ( 2020 07:02 )             23.8       06-16    139  |  106  |  11  ----------------------------<  124<H>  4.3   |  27  |  0.92    Ca    8.7      2020 10:53    TPro  6.5  /  Alb  2.3<L>  /  TBili  0.3  /  DBili  x   /  AST  10<L>  /  ALT  12  /  AlkPhos  108  06-16    Lactate 0.9           -17 @ 07:02    Lactate 3.7           06-16 @ 10:53          PT/INR - ( 2020 07:02 )   PT: 15.6 sec;   INR: 1.38 ratio         PTT - ( 2020 18:26 )  PTT:34.9 sec  Urinalysis Basic - ( 2020 00:22 )    Color: Yellow / Appearance: very cloudy / S.010 / pH: x  Gluc: x / Ketone: Trace  / Bili: Negative / Urobili: Negative   Blood: x / Protein: 100 / Nitrite: Negative   Leuk Esterase: Moderate / RBC: 0-2 /HPF / WBC >50   Sq Epi: x / Non Sq Epi: Occasional / Bacteria: Moderate      .Tissue Other, left metatarsal bone --   Rare polymorphonuclear leukocytes seen per low power field  No organisms seen per oil power field 06-17 @ 01:59        Radiology: ***  Bedside lung ultrasound: ***  Bedside ECHO: ***    CENTRAL LINE: Y/N          DATE INSERTED:              REMOVE: Y/N  RODRIGUEZ: Y/N                        DATE INSERTED:              REMOVE: Y/N  A-LINE: Y/N                       DATE INSERTED:              REMOVE: Y/N    GLOBAL ISSUE/BEST PRACTICE  Analgesia:   Sedation:   CAM-ICU:   HOB elevation: yes  Stress ulcer prophylaxis:   VTE prophylaxis:   Glycemic control:   Nutrition:     CODE STATUS: ***  [facility] discussion: Y Patient is a 49y old  Male who presents with a chief complaint of left foot infection/bleeding (2020 13:37)    24 hour events:   49year old Male CAD s/p BMS to RCA (2019), RUL subsegmental PE (2019, on Eliquis), hx of NSTEMI and PAF s/p ex-lap omentopexy and plication for perforated antral ulcer (2019),  CVA s/p tPA (2019), HTN and DM2, osteomyelitis s/p debridement.  s/p left tma 2020, without complication seen at [facility] ED for left foot TMA wound. Pt was seen in wound care by Dr. Hale today for his left foot TMA. Pt relates he went to [facility] last week for the wound and states that the wound was packed and redressed, and received total of 4U PRBC on 2020 and 2020. Pt presented today and wound was painful and foul smelling. Patient with debridement of wound with podiatry on 2020.    REVIEW OF SYSTEMS  Constitutional: No fever, chills, fatigue  Neuro: No headache, numbness, weakness  Resp: No cough, wheezing, shortness of breath  CVS: No chest pain, palpitations, leg swelling  GI: No abdominal pain, nausea, vomiting, diarrhea   : No dysuria, frequency, incontinence  MSK: L foot pain    T(F): 98.7 (20 @ 07:35), Max: 101.4 (20 @ 21:22)  HR: 58 (20 @ 07:00) (57 - 79)  BP: 101/53 (20 @ 07:00) (60/37 - 137/63)  RR: 17 (20 @ 07:00) (15 - 33)  SpO2: 95% (20 @ 07:00) (91% - 99%)    I&O's Summary     07:01  -   @ 07:00  --------------------------------------------------------  IN: 2290 mL / OUT: 1800 mL / NET: 490 mL    PHYSICAL EXAM  General: NAD, sitting in bed comfortably  CNS: AAO x3  HEENT: PEERL  Resp: CTA b/l  CVS: irregularly irregular, no murmurs  Abd: soft, NT, ND  Ext: L foot in ace warp, surgical site seen, c/d/i, no erythema, skin heeling well    MEDICATIONS  ceFAZolin   IVPB IV Intermittent    isosorbide   mononitrate ER Tablet (IMDUR) Oral  losartan Oral  metoprolol tartrate Oral  tamsulosin Oral    atorvastatin Oral  dextrose 40% Gel Oral PRN  dextrose 50% Injectable IV Push  dextrose 50% Injectable IV Push  dextrose 50% Injectable IV Push  glucagon  Injectable IntraMuscular PRN  insulin lispro (HumaLOG) corrective regimen sliding scale SubCutaneous  insulin lispro (HumaLOG) corrective regimen sliding scale SubCutaneous  methimazole Oral      ondansetron Injectable IV Push PRN  oxycodone    5 mG/acetaminophen 325 mG Oral PRN    bisacodyl Suppository Rectal PRN  famotidine    Tablet Oral  pantoprazole    Tablet Oral  senna Oral  sucralfate Oral    bethanechol Oral  phenazopyridine Oral    ascorbic acid Oral  dextrose 5%. IV Continuous  ferrous    sulfate Oral  lactated ringers. IV Continuous  multivitamin Oral                          7.5    10.68 )-----------( 327      ( 2020 07:02 )             23.8       06-16    139  |  106  |  11  ----------------------------<  124<H>  4.3   |  27  |  0.92    Ca    8.7      2020 10:53    TPro  6.5  /  Alb  2.3<L>  /  TBili  0.3  /  DBili  x   /  AST  10<L>  /  ALT  12  /  AlkPhos  108  -    Lactate 0.9           - @ 07:02    Lactate 3.7           06-16 @ 10:53      PT/INR - ( 2020 07:02 )   PT: 15.6 sec;   INR: 1.38 ratio    PTT - ( 2020 18:26 )  PTT:34.9 sec    Urinalysis Basic - ( 2020 00:22 )    Color: Yellow / Appearance: very cloudy / S.010 / pH: x  Gluc: x / Ketone: Trace  / Bili: Negative / Urobili: Negative   Blood: x / Protein: 100 / Nitrite: Negative   Leuk Esterase: Moderate / RBC: 0-2 /HPF / WBC >50   Sq Epi: x / Non Sq Epi: Occasional / Bacteria: Moderate      .Tissue Other, left metatarsal bone --   Rare polymorphonuclear leukocytes seen per low power field  No organisms seen per oil power field 06-17 @ 01:59    CENTRAL LINE: N  RODRIGUEZ: Y  A-LINE: N    GLOBAL ISSUE/BEST PRACTICE  Analgesia: Y  Sedation: N/A  CAM-ICU: Y  HOB elevation: yes  Stress ulcer prophylaxis: Y  VTE prophylaxis: Y  Glycemic control: Y  Nutrition: Y    CODE STATUS: FULL  GOC discussion: Y

## 2020-06-17 NOTE — PROGRESS NOTE ADULT - SUBJECTIVE AND OBJECTIVE BOX
Patient is a 49y old  Male who presents with a chief complaint of left foot infection/bleeding (2020 13:33)      INTERVAL /OVERNIGHT EVENTS: feels ok    MEDICATIONS  (STANDING):  ascorbic acid 1000 milliGRAM(s) Oral daily  atorvastatin 40 milliGRAM(s) Oral at bedtime  bethanechol 25 milliGRAM(s) Oral three times a day  ceFAZolin   IVPB 2000 milliGRAM(s) IV Intermittent every 8 hours  dextrose 5%. 1000 milliLiter(s) (50 mL/Hr) IV Continuous <Continuous>  dextrose 50% Injectable 12.5 Gram(s) IV Push once  dextrose 50% Injectable 25 Gram(s) IV Push once  dextrose 50% Injectable 25 Gram(s) IV Push once  famotidine    Tablet 20 milliGRAM(s) Oral every 24 hours  ferrous    sulfate 325 milliGRAM(s) Oral two times a day  insulin lispro (HumaLOG) corrective regimen sliding scale   SubCutaneous three times a day before meals  insulin lispro (HumaLOG) corrective regimen sliding scale   SubCutaneous at bedtime  isosorbide   mononitrate ER Tablet (IMDUR) 30 milliGRAM(s) Oral every 24 hours  losartan 25 milliGRAM(s) Oral daily  methimazole 10 milliGRAM(s) Oral daily  metoprolol tartrate 50 milliGRAM(s) Oral every 12 hours  multivitamin 1 Tablet(s) Oral daily  pantoprazole    Tablet 40 milliGRAM(s) Oral before breakfast  phenazopyridine 100 milliGRAM(s) Oral every 8 hours  senna 2 Tablet(s) Oral at bedtime  sucralfate 1 Gram(s) Oral four times a day  tamsulosin 0.8 milliGRAM(s) Oral at bedtime    MEDICATIONS  (PRN):  bisacodyl Suppository 10 milliGRAM(s) Rectal daily PRN Constipation  dextrose 40% Gel 15 Gram(s) Oral once PRN Blood Glucose LESS THAN 70 milliGRAM(s)/deciliter  glucagon  Injectable 1 milliGRAM(s) IntraMuscular once PRN Glucose LESS THAN 70 milligrams/deciliter  ondansetron Injectable 4 milliGRAM(s) IV Push once PRN Nausea and/or Vomiting      Allergies    fish (Hives)  No Known Drug Allergies    Intolerances        REVIEW OF SYSTEMS: denies    Vital Signs Last 24 Hrs  T(C): 37.1 (2020 15:35), Max: 38.6 (2020 21:22)  T(F): 98.7 (2020 15:35), Max: 101.4 (2020 21:22)  HR: 62 (2020 15:00) (55 - 71)  BP: 106/57 (2020 15:00) (90/61 - 137/63)  BP(mean): 79 (2020 15:00) (69 - 90)  RR: 26 (2020 15:00) (15 - 28)  SpO2: 92% (2020 15:00) (92% - 99%)    PHYSICAL EXAM:  GENERAL: NAD, well-groomed, well-developed  HEAD:  Atraumatic, Normocephalic  EYES: EOMI, PERRLA, conjunctiva and sclera clear  ENMT: No tonsillar erythema, exudates, or enlargement; Moist mucous membranes, Good dentition, No lesions  NECK: Supple, No JVD, Normal thyroid  NERVOUS SYSTEM:  Alert & Oriented X3, Good concentration; Motor Strength 5/5 B/L upper and lower extremities; DTRs 2+ intact and symmetric  CHEST/LUNG: Clear to auscultation bilaterally; No rales, rhonchi, wheezing, or rubs  HEART: Regular rate and rhythm; No murmurs, rubs, or gallops  ABDOMEN: Soft, Nontender, Nondistended; Bowel sounds present  EXTREMITIES:  left TMA revision  LYMPH: No lymphadenopathy noted  SKIN: No rashes or lesions    LABS:                        8.5    x     )-----------( x        ( 2020 13:58 )             26.7     2020 07:02    140    |  107    |  12     ----------------------------<  128    3.7     |  25     |  0.79     Ca    7.9        2020 07:02  Phos  2.9       2020 07:02  Mg     1.8       2020 07:02      PT/INR - ( 2020 07:02 )   PT: 15.6 sec;   INR: 1.38 ratio         PTT - ( 2020 18:26 )  PTT:34.9 sec  Urinalysis Basic - ( 2020 00:22 )    Color: Yellow / Appearance: very cloudy / S.010 / pH: x  Gluc: x / Ketone: Trace  / Bili: Negative / Urobili: Negative   Blood: x / Protein: 100 / Nitrite: Negative   Leuk Esterase: Moderate / RBC: 0-2 /HPF / WBC >50   Sq Epi: x / Non Sq Epi: Occasional / Bacteria: Moderate      CAPILLARY BLOOD GLUCOSE      POCT Blood Glucose.: 237 mg/dL (2020 11:59)  POCT Blood Glucose.: 159 mg/dL (2020 07:58)  POCT Blood Glucose.: 139 mg/dL (2020 22:38)      RADIOLOGY & ADDITIONAL TESTS:    Notes Reviewed:  [x ] YES  [ ] NO    Care Discussed with Consultants/Other Providers [x ] YES  [ ] NO

## 2020-06-17 NOTE — PROGRESS NOTE ADULT - SUBJECTIVE AND OBJECTIVE BOX
49y year old Male seen at [facility] ICU1 11 s/p left foot TMA revision (DOS: 6/16/20). Patient relates no overnight events and states that they are doing well at this time. Denies any fever, chills, nausea, vomiting, chest pain, shortness of breath, or calf pain at this time.    Allergies  fish (Hives)  No Known Drug Allergies    MEDICATIONS  (STANDING):  ascorbic acid 1000 milliGRAM(s) Oral daily  atorvastatin 40 milliGRAM(s) Oral at bedtime  bethanechol 25 milliGRAM(s) Oral three times a day  ceFAZolin   IVPB 2000 milliGRAM(s) IV Intermittent every 8 hours  dextrose 5%. 1000 milliLiter(s) (50 mL/Hr) IV Continuous <Continuous>  dextrose 50% Injectable 12.5 Gram(s) IV Push once  dextrose 50% Injectable 25 Gram(s) IV Push once  dextrose 50% Injectable 25 Gram(s) IV Push once  famotidine    Tablet 20 milliGRAM(s) Oral every 24 hours  ferrous    sulfate 325 milliGRAM(s) Oral two times a day  insulin lispro (HumaLOG) corrective regimen sliding scale   SubCutaneous three times a day before meals  insulin lispro (HumaLOG) corrective regimen sliding scale   SubCutaneous at bedtime  isosorbide   mononitrate ER Tablet (IMDUR) 30 milliGRAM(s) Oral every 24 hours  losartan 25 milliGRAM(s) Oral daily  methimazole 10 milliGRAM(s) Oral daily  metoprolol tartrate 50 milliGRAM(s) Oral every 12 hours  multivitamin 1 Tablet(s) Oral daily  pantoprazole    Tablet 40 milliGRAM(s) Oral before breakfast  phenazopyridine 100 milliGRAM(s) Oral every 8 hours  senna 2 Tablet(s) Oral at bedtime  sucralfate 1 Gram(s) Oral four times a day  tamsulosin 0.8 milliGRAM(s) Oral at bedtime    MEDICATIONS  (PRN):  bisacodyl Suppository 10 milliGRAM(s) Rectal daily PRN Constipation  dextrose 40% Gel 15 Gram(s) Oral once PRN Blood Glucose LESS THAN 70 milliGRAM(s)/deciliter  glucagon  Injectable 1 milliGRAM(s) IntraMuscular once PRN Glucose LESS THAN 70 milligrams/deciliter  ondansetron Injectable 4 milliGRAM(s) IV Push once PRN Nausea and/or Vomiting      Vital Signs Last 24 Hrs  T(C): 37.1 (17 Jun 2020 07:35), Max: 38.6 (16 Jun 2020 21:22)  T(F): 98.7 (17 Jun 2020 07:35), Max: 101.4 (16 Jun 2020 21:22)  HR: 64 (17 Jun 2020 10:00) (57 - 78)  BP: 90/61 (17 Jun 2020 10:00) (60/37 - 137/63)  BP(mean): 69 (17 Jun 2020 10:00) (58 - 90)  RR: 24 (17 Jun 2020 10:00) (15 - 33)  SpO2: 93% (17 Jun 2020 10:00) (91% - 99%)    PHYSICAL EXAM:  Vascular: DP & PT palpable, Digital hair absent. erythema and edema noted to the dorsal foot.  Neurological: Light touch sensation intact bilaterally  Musculoskeletal: s/p left TMA w/achilles tenotomy  Dermatological: Incision site of the left foot noted with intact sutures, no dehiscence, mild winston-incision erythema, no proximal streaking, no fluctuance, no malodor, no signs of infection at this time.    CBC Full  -  ( 17 Jun 2020 07:02 )  WBC Count : 10.68 K/uL  RBC Count : 2.69 M/uL  Hemoglobin : 7.5 g/dL  Hematocrit : 23.8 %  Platelet Count - Automated : 327 K/uL  Mean Cell Volume : 88.5 fl  Mean Cell Hemoglobin : 27.9 pg  Mean Cell Hemoglobin Concentration : 31.5 gm/dL  Auto Neutrophil # : 8.15 K/uL  Auto Lymphocyte # : 1.36 K/uL  Auto Monocyte # : 0.93 K/uL  Auto Eosinophil # : 0.14 K/uL  Auto Basophil # : 0.03 K/uL  Auto Neutrophil % : 76.3 %  Auto Lymphocyte % : 12.7 %  Auto Monocyte % : 8.7 %  Auto Eosinophil % : 1.3 %  Auto Basophil % : 0.3 %    06-17    140  |  107  |  12  ----------------------------<  128<H>  3.7   |  25  |  0.79    Ca    7.9<L>      17 Jun 2020 07:02  Phos  2.9     06-17  Mg     1.8     06-17    TPro  6.5  /  Alb  2.3<L>  /  TBili  0.3  /  DBili  x   /  AST  10<L>  /  ALT  12  /  AlkPhos  108  06-16      PT/INR - ( 17 Jun 2020 07:02 )   PT: 15.6 sec;   INR: 1.38 ratio         PTT - ( 16 Jun 2020 18:26 )  PTT:34.9 sec      Culture - Tissue with Gram Stain (collected 17 Jun 2020 01:59)  Source: .Tissue Other, left metatarsal bone  Gram Stain (17 Jun 2020 06:15):    Rare polymorphonuclear leukocytes seen per low power field    No organisms seen per oil power field        Imaging: left foot postop radiographs reveal s/p TMA

## 2020-06-17 NOTE — PROGRESS NOTE ADULT - PROBLEM SELECTOR PLAN 1
Patient examined and evaluated.  Surgical site dressed with adaptic and dry, sterile dressing.  Patient is to be non weight bearing to the left lower extremity.  Surgical pathology and cultures pending at this time.  Antibiotics as per ID recommendations.

## 2020-06-17 NOTE — PROGRESS NOTE ADULT - SUBJECTIVE AND OBJECTIVE BOX
infectious diseases progress note:    SARAH MORRISON is a 49y y. o. Male patient    PT transferred to ICU, "not sure how I got here"    Allergies    fish (Hives)  No Known Drug Allergies    Intolerances        ANTIBIOTICS/RELEVANT:  antimicrobials  ceFAZolin   IVPB 2000 milliGRAM(s) IV Intermittent every 8 hours    immunologic:    OTHER:  ascorbic acid 1000 milliGRAM(s) Oral daily  atorvastatin 40 milliGRAM(s) Oral at bedtime  bethanechol 25 milliGRAM(s) Oral three times a day  bisacodyl Suppository 10 milliGRAM(s) Rectal daily PRN  dextrose 40% Gel 15 Gram(s) Oral once PRN  dextrose 5%. 1000 milliLiter(s) IV Continuous <Continuous>  dextrose 50% Injectable 12.5 Gram(s) IV Push once  dextrose 50% Injectable 25 Gram(s) IV Push once  dextrose 50% Injectable 25 Gram(s) IV Push once  famotidine    Tablet 20 milliGRAM(s) Oral every 24 hours  ferrous    sulfate 325 milliGRAM(s) Oral two times a day  glucagon  Injectable 1 milliGRAM(s) IntraMuscular once PRN  insulin lispro (HumaLOG) corrective regimen sliding scale   SubCutaneous three times a day before meals  insulin lispro (HumaLOG) corrective regimen sliding scale   SubCutaneous at bedtime  isosorbide   mononitrate ER Tablet (IMDUR) 30 milliGRAM(s) Oral every 24 hours  lactated ringers. 1000 milliLiter(s) IV Continuous <Continuous>  losartan 25 milliGRAM(s) Oral daily  methimazole 10 milliGRAM(s) Oral daily  metoprolol tartrate 50 milliGRAM(s) Oral every 12 hours  multivitamin 1 Tablet(s) Oral daily  ondansetron Injectable 4 milliGRAM(s) IV Push once PRN  oxycodone    5 mG/acetaminophen 325 mG 2 Tablet(s) Oral every 6 hours PRN  pantoprazole    Tablet 40 milliGRAM(s) Oral before breakfast  phenazopyridine 100 milliGRAM(s) Oral every 8 hours  senna 2 Tablet(s) Oral at bedtime  sucralfate 1 Gram(s) Oral four times a day  tamsulosin 0.8 milliGRAM(s) Oral at bedtime      Objective:  Vital Signs Last 24 Hrs  T(C): 37.1 (2020 07:35), Max: 38.6 (2020 21:22)  T(F): 98.7 (2020 07:35), Max: 101.4 (2020 21:22)  HR: 58 (2020 07:00) (57 - 78)  BP: 101/53 (2020 07:00) (60/37 - 137/63)  BP(mean): 74 (2020 07:00) (58 - 90)  RR: 17 (2020 07:00) (15 - 33)  SpO2: 95% (2020 07:00) (91% - 99%)    T(C): 37.1 (20 @ 07:35), Max: 38.6 (20 @ 21:22)  T(C): 37.1 (20 @ 07:35), Max: 38.6 (20 @ 21:22)  T(C): 37.1 (20 @ 07:35), Max: 38.6 (20 @ 21:22)    PHYSICAL EXAM:  HEENT: NC atraumatic  Neck: supple  Respiratory: no accessory muscle use, breathing comfortably  Cardiovascular: distant  Gastrointestinal: normal appearing, nondistended  Extremities: no clubbing, no cyanosis, foot is wrapped      LABS:                          7.5    10.68 )-----------( 327      ( 2020 07:02 )             23.8       10.68  @ 07:02  16.09 -16 @ 18:26  17.61  @ 12:23  9.14  @ 10:53          140  |  107  |  12  ----------------------------<  128<H>  3.7   |  25  |  0.79    Ca    7.9<L>      2020 07:02  Phos  2.9       Mg     1.8         TPro  6.5  /  Alb  2.3<L>  /  TBili  0.3  /  DBili  x   /  AST  10<L>  /  ALT  12  /  AlkPhos  108        Creatinine, Serum: 0.79 mg/dL (20 @ 07:02)  Creatinine, Serum: 0.92 mg/dL (20 @ 10:53)      PT/INR - ( 2020 07:02 )   PT: 15.6 sec;   INR: 1.38 ratio         PTT - ( 2020 18:26 )  PTT:34.9 sec  Urinalysis Basic - ( 2020 00:22 )    Color: Yellow / Appearance: very cloudy / S.010 / pH: x  Gluc: x / Ketone: Trace  / Bili: Negative / Urobili: Negative   Blood: x / Protein: 100 / Nitrite: Negative   Leuk Esterase: Moderate / RBC: 0-2 /HPF / WBC >50   Sq Epi: x / Non Sq Epi: Occasional / Bacteria: Moderate            INFLAMMATORY MARKERS  Auto Neutrophil #: 8.15 K/uL (20 @ 07:02)  Auto Lymphocyte #: 1.36 K/uL (20 @ 07:02)  Auto Lymphocyte #: 1.11 K/uL (20 @ 10:53)  Auto Neutrophil #: 6.92 K/uL (20 @ 10:53)    Lactate, Blood: 0.9 mmol/L (20 @ 07:02)  Lactate, Blood: 3.7 mmol/L (20 @ 10:53)    Auto Eosinophil #: 0.14 K/uL (20 @ 07:02)  Auto Eosinophil #: 0.20 K/uL (20 @ 10:53)      Sedimentation Rate, Erythrocyte: 42 mm/hr (20 @ 10:53)                    INR: 1.38 ratio (20 @ 07:02)  INR: 1.40 ratio (20 @ 18:26)  Activated Partial Thromboplastin Time: 34.9 sec (20 @ 18:26)  Activated Partial Thromboplastin Time: 35.2 sec (20 @ 10:53)  INR: 1.55 ratio (20 @ 10:53)          MICROBIOLOGY:              RADIOLOGY & ADDITIONAL STUDIES:

## 2020-06-17 NOTE — PROGRESS NOTE ADULT - ASSESSMENT
49year old Male CAD s/p BMS to RCA (8/2019), RUL subsegmental PE (6/2019, on Eliquis), hx of NSTEMI and PAF s/p ex-lap omentopexy and plication for perforated antral ulcer (5/2019),  CVA s/p tPA (8/2019), HTN and DM2, osteomyelitis s/p debridement s/p left tma 5/2020, admitted for L foot wound s/p debridement by podiatry on 6/16/2020.    Neuro: Pain control. PT when stable  Pulm:   Cardio: CAD: s/p PTCA to prox RCA, on asa and atorvastatin.  Hold asa in setting of ABLA. Symptoms in ER likely demand from hypovolemia. F/u EKG in the AM. Hypotension improved, will restart imdur for now. Afib: on eliquis at home, hold in setting of ABLA.  On metoprolol 50mg BID at home, restarted. HTN: on losartan 25mg QD and metoprolol 50mg BID at home, restarted this AM.  GI: GI ppx: protonix 40mg IVP QD. Started on diet  : strict I&Os  ID: follow up cultures.  S/p debridement of L foot wound (after having L TMA 5/2020), continue with ancef for now  Heme: ABLA: likely from bleeding from foot wound, s/p total of 3U PRBC and 1U plasma .  H/H low again, will order for another unit of PRBC. Hypovolemic shock, now improved. Hold eliquis and asa in setting of bleeding.  s/p Kcentra for supratherapeutic INR, improving.  Endo: DM: continue with ISS.  Patient on basiglar and metformin at home. 49year old Male CAD s/p BMS to RCA (8/2019), RUL subsegmental PE (6/2019, on Eliquis), hx of NSTEMI and PAF s/p ex-lap omentopexy and plication for perforated antral ulcer (5/2019),  CVA s/p tPA (8/2019), HTN and DM2, osteomyelitis s/p debridement s/p left tma 5/2020, admitted for L foot wound s/p debridement by podiatry on 6/16/2020.    Neuro: Pain control. PT when stable  Pulm:   Cardio: CAD: s/p PTCA to prox RCA, on asa and atorvastatin.  Hold asa in setting of ABLA. Symptoms in ER likely demand from hypovolemia. Hypotension improved, will restart imdur for now. Afib: on eliquis at home, hold in setting of ABLA.  On metoprolol 50mg BID at home, restarted. HTN: on losartan 25mg QD and metoprolol 50mg BID at home, restarted this AM.  GI: GI ppx: protonix 40mg IVP QD. Started on diet  : strict I&Os  ID: follow up cultures.  S/p debridement of L foot wound (after having L TMA 5/2020), continue with ancef for now. UA looks abnormal, follow up UCx  Heme: ABLA: likely from bleeding from foot wound, s/p total of 3U PRBC and 1U plasma .  H/H low again, will order for another unit of PRBC. Hypovolemic shock, now improved. Hold eliquis and asa in setting of bleeding.  s/p Kcentra for supratherapeutic INR, improving.  Endo: DM: continue with ISS.  Patient on basiglar and metformin at home. Hyperthyroidism: continue with methimazole

## 2020-06-17 NOTE — PROGRESS NOTE ADULT - ASSESSMENT
49m CAD s/p BMS to RCA (8/2019), RUL subsegmental PE (6/2019, on Eliquis), hx of NSTEMI and PAF s/p ex-lap omentopexy and plication for perforated antral ulcer (5/2019),  CVA s/p tPA (8/2019), HTN and DM2, osteomyelitis s/p debridement.  s/p left tma 5/2020, without complication.    Reports that for the last month he has been bleeding from his wound.  He has several units of blood transfused two days ago.  He went to wound care today and his wound was felt to be foul smelling/draining.  He was referred to the er for eval and admission.    In the er his bp was initially marginal.  He dropped his bp to 90/40, and had two episodes of bleeding from the wound, which was wrapped with a pressure dressing.  He developed chest disomfort.  He is getting fluids and blood has been ordered       - there was initially no evidence of acute ischemia.  - known cad, with bms to prox rca 8/2019, and an occluded rpls not intervened upon  - Currently CP free.   - current off of all antiplatelets  - resume asa when able given PCI.   - cont statin	    -there is no evidence of significant arrhythmia.  -hx of paf and pe, on ac  - h/h is downtrending. Will need PRBC to keep Hb >8.     - BP is soft.   - cont losartan, metoprolol, Imdur as bp tolerates.      - there is no evidence for meaningful  volume overload.  - mildly edematous though this may be multifactorial  - known severe lvh with normal lvef  - watch for hfpef      - await plan from podiatry/wound care    - monitor electrolytes, keep k>4, Mg>2     Patient at high risk for decompensation given the above comorbidities and active medical issues. Critically ill. I have personally spent >35 minutes  of critical care time in examining patient, reviewing chart, discussing care/management with patient/family, and  ICU team.

## 2020-06-17 NOTE — PROGRESS NOTE ADULT - SUBJECTIVE AND OBJECTIVE BOX
[facility] -- Bolivar Carbajal, Brittany Gallegos Pannella, Patel, Savella  Office # [phone]      Follow Up:  AF    Subjective/Observations: Patient seen and examined. Events noted. Resting comfortably in bed. No complaints of chest pain, dyspnea, or palpitations reported. No signs of orthopnea or PND. Still having pain in foot.       REVIEW OF SYSTEMS: Limited 2/2 comorbidities     PAST MEDICAL & SURGICAL HISTORY:  Cerebrovascular accident  CAD S/P percutaneous coronary angioplasty  Osteomyelitis: s/p debridement  History of non-ST elevation myocardial infarction (NSTEMI)  Pulmonary embolism  Perforated gastric ulcer: s/p emergent ex-lap omentopexy and plication 6/2019  BPH (benign prostatic hyperplasia)  Hypertension  Afib  Diabetes mellitus with no complication  Diabetes  Traumatic amputation of left foot, initial encounter  Perforated gastric ulcer  H/O abdominal surgery      MEDICATIONS  (STANDING):  ascorbic acid 1000 milliGRAM(s) Oral daily  atorvastatin 40 milliGRAM(s) Oral at bedtime  bethanechol 25 milliGRAM(s) Oral three times a day  ceFAZolin   IVPB 2000 milliGRAM(s) IV Intermittent every 8 hours  dextrose 5%. 1000 milliLiter(s) (50 mL/Hr) IV Continuous <Continuous>  dextrose 50% Injectable 12.5 Gram(s) IV Push once  dextrose 50% Injectable 25 Gram(s) IV Push once  dextrose 50% Injectable 25 Gram(s) IV Push once  famotidine    Tablet 20 milliGRAM(s) Oral every 24 hours  ferrous    sulfate 325 milliGRAM(s) Oral two times a day  insulin lispro (HumaLOG) corrective regimen sliding scale   SubCutaneous three times a day before meals  insulin lispro (HumaLOG) corrective regimen sliding scale   SubCutaneous at bedtime  isosorbide   mononitrate ER Tablet (IMDUR) 30 milliGRAM(s) Oral every 24 hours  lactated ringers. 1000 milliLiter(s) (75 mL/Hr) IV Continuous <Continuous>  losartan 25 milliGRAM(s) Oral daily  methimazole 10 milliGRAM(s) Oral daily  metoprolol tartrate 50 milliGRAM(s) Oral every 12 hours  multivitamin 1 Tablet(s) Oral daily  pantoprazole    Tablet 40 milliGRAM(s) Oral before breakfast  phenazopyridine 100 milliGRAM(s) Oral every 8 hours  senna 2 Tablet(s) Oral at bedtime  sucralfate 1 Gram(s) Oral four times a day  tamsulosin 0.8 milliGRAM(s) Oral at bedtime    MEDICATIONS  (PRN):  bisacodyl Suppository 10 milliGRAM(s) Rectal daily PRN Constipation  dextrose 40% Gel 15 Gram(s) Oral once PRN Blood Glucose LESS THAN 70 milliGRAM(s)/deciliter  glucagon  Injectable 1 milliGRAM(s) IntraMuscular once PRN Glucose LESS THAN 70 milligrams/deciliter  ondansetron Injectable 4 milliGRAM(s) IV Push once PRN Nausea and/or Vomiting  oxycodone    5 mG/acetaminophen 325 mG 2 Tablet(s) Oral every 6 hours PRN Moderate Pain (4 - 6)      Allergies    fish (Hives)  No Known Drug Allergies    Intolerances            Vital Signs Last 24 Hrs  T(C): 37.1 (17 Jun 2020 07:35), Max: 38.6 (16 Jun 2020 21:22)  T(F): 98.7 (17 Jun 2020 07:35), Max: 101.4 (16 Jun 2020 21:22)  HR: 58 (17 Jun 2020 07:00) (57 - 79)  BP: 101/53 (17 Jun 2020 07:00) (60/37 - 137/63)  BP(mean): 74 (17 Jun 2020 07:00) (58 - 90)  RR: 17 (17 Jun 2020 07:00) (15 - 33)  SpO2: 95% (17 Jun 2020 07:00) (91% - 99%)    I&O's Summary    16 Jun 2020 07:01  -  17 Jun 2020 07:00  --------------------------------------------------------  IN: 2290 mL / OUT: 1800 mL / NET: 490 mL      Weight (kg): 100 (06-16 @ 18:17)    PHYSICAL EXAM:  TELE: SR lee PAT PVC  Constitutional: NAD, awake    HEENT: Moist Mucous Membranes, Anicteric  Pulmonary: Decreased breath sounds b/l. No rales, crackles or wheeze appreciated.   Cardiovascular: Regular, S1 and S2, No murmurs, rubs, gallops or clicks  Gastrointestinal: Bowel Sounds present, soft, nontender.   Lymph: No peripheral edema. No lymphadenopathy.  Skin: No visible rashes or ulcers.  Psych:  Mood & affect appropriate for situation    LABS: All Labs Reviewed:                        7.5    10.68 )-----------( 327      ( 17 Jun 2020 07:02 )             23.8                         7.9    16.09 )-----------( 376      ( 16 Jun 2020 18:26 )             25.2                         7.9    17.61 )-----------( 388      ( 16 Jun 2020 12:23 )             26.1     17 Jun 2020 07:02    140    |  107    |  12     ----------------------------<  128    3.7     |  25     |  0.79   16 Jun 2020 10:53    139    |  106    |  11     ----------------------------<  124    4.3     |  27     |  0.92     Ca    7.9        17 Jun 2020 07:02  Ca    8.7        16 Jun 2020 10:53  Phos  2.9       17 Jun 2020 07:02  Mg     1.8       17 Jun 2020 07:02    TPro  6.5    /  Alb  2.3    /  TBili  0.3    /  DBili  x      /  AST  10     /  ALT  12     /  AlkPhos  108    16 Jun 2020 10:53    PT/INR - ( 17 Jun 2020 07:02 )   PT: 15.6 sec;   INR: 1.38 ratio         PTT - ( 16 Jun 2020 18:26 )  PTT:34.9 sec

## 2020-06-17 NOTE — PROGRESS NOTE ADULT - ASSESSMENT
49year old Male seen at [facility] ED for left foot TMA wound sp amputation with clean margins, readmitted with wound painful and foul smelling.     Revision of transmetatarsal amputation of left foot 16-Jun-2020 21:37:39  Toan Tavarez. with estimated 300ml blood loss

## 2020-06-17 NOTE — DIETITIAN INITIAL EVALUATION ADULT. - PROBLEM SELECTOR PLAN 1
admit  pan-culture  iv abx  May proceed to OR for emergent debridement and hemastasis  ID/podiatry consults  transfuse 1 unit prbc

## 2020-06-18 DIAGNOSIS — R78.81 BACTEREMIA: ICD-10-CM

## 2020-06-18 LAB
-  CANDIDA ALBICANS: SIGNIFICANT CHANGE UP
-  CANDIDA GLABRATA: SIGNIFICANT CHANGE UP
-  CANDIDA KRUSEI: SIGNIFICANT CHANGE UP
-  CANDIDA PARAPSILOSIS: SIGNIFICANT CHANGE UP
-  CANDIDA TROPICALIS: SIGNIFICANT CHANGE UP
-  COAGULASE NEGATIVE STAPHYLOCOCCUS: SIGNIFICANT CHANGE UP
-  K. PNEUMONIAE GROUP: SIGNIFICANT CHANGE UP
-  KPC RESISTANCE GENE: SIGNIFICANT CHANGE UP
-  STREPTOCOCCUS SP. (NOT GRP A, B OR S PNEUMONIAE): SIGNIFICANT CHANGE UP
A BAUMANNII DNA SPEC QL NAA+PROBE: SIGNIFICANT CHANGE UP
A1C WITH ESTIMATED AVERAGE GLUCOSE RESULT: 6.6 % — HIGH (ref 4–5.6)
ALBUMIN SERPL ELPH-MCNC: 2.1 G/DL — LOW (ref 3.3–5)
ALP SERPL-CCNC: 94 U/L — SIGNIFICANT CHANGE UP (ref 40–120)
ALT FLD-CCNC: 9 U/L — LOW (ref 12–78)
ANION GAP SERPL CALC-SCNC: 8 MMOL/L — SIGNIFICANT CHANGE UP (ref 5–17)
AST SERPL-CCNC: 10 U/L — LOW (ref 15–37)
BASOPHILS # BLD AUTO: 0.03 K/UL — SIGNIFICANT CHANGE UP (ref 0–0.2)
BASOPHILS NFR BLD AUTO: 0.3 % — SIGNIFICANT CHANGE UP (ref 0–2)
BILIRUB SERPL-MCNC: 0.5 MG/DL — SIGNIFICANT CHANGE UP (ref 0.2–1.2)
BUN SERPL-MCNC: 10 MG/DL — SIGNIFICANT CHANGE UP (ref 7–23)
CALCIUM SERPL-MCNC: 8 MG/DL — LOW (ref 8.5–10.1)
CHLORIDE SERPL-SCNC: 106 MMOL/L — SIGNIFICANT CHANGE UP (ref 96–108)
CO2 SERPL-SCNC: 25 MMOL/L — SIGNIFICANT CHANGE UP (ref 22–31)
CREAT SERPL-MCNC: 0.81 MG/DL — SIGNIFICANT CHANGE UP (ref 0.5–1.3)
E CLOAC COMP DNA BLD POS QL NAA+PROBE: SIGNIFICANT CHANGE UP
E COLI DNA BLD POS QL NAA+NON-PROBE: SIGNIFICANT CHANGE UP
ENTEROCOC DNA BLD POS QL NAA+NON-PROBE: SIGNIFICANT CHANGE UP
ENTEROCOC DNA BLD POS QL NAA+NON-PROBE: SIGNIFICANT CHANGE UP
EOSINOPHIL # BLD AUTO: 0.21 K/UL — SIGNIFICANT CHANGE UP (ref 0–0.5)
EOSINOPHIL NFR BLD AUTO: 2.4 % — SIGNIFICANT CHANGE UP (ref 0–6)
ESTIMATED AVERAGE GLUCOSE: 143 MG/DL — HIGH (ref 68–114)
GLUCOSE SERPL-MCNC: 261 MG/DL — HIGH (ref 70–99)
GP B STREP DNA BLD POS QL NAA+NON-PROBE: SIGNIFICANT CHANGE UP
GRAM STN FLD: SIGNIFICANT CHANGE UP
HAEM INFLU DNA BLD POS QL NAA+NON-PROBE: SIGNIFICANT CHANGE UP
HCT VFR BLD CALC: 26.6 % — LOW (ref 39–50)
HGB BLD-MCNC: 8.3 G/DL — LOW (ref 13–17)
IMM GRANULOCYTES NFR BLD AUTO: 0.7 % — SIGNIFICANT CHANGE UP (ref 0–1.5)
INR BLD: 1.22 RATIO — HIGH (ref 0.88–1.16)
K OXYTOCA DNA BLD POS QL NAA+NON-PROBE: SIGNIFICANT CHANGE UP
L MONOCYTOG DNA BLD POS QL NAA+NON-PROBE: SIGNIFICANT CHANGE UP
LYMPHOCYTES # BLD AUTO: 1.06 K/UL — SIGNIFICANT CHANGE UP (ref 1–3.3)
LYMPHOCYTES # BLD AUTO: 12 % — LOW (ref 13–44)
MAGNESIUM SERPL-MCNC: 1.8 MG/DL — SIGNIFICANT CHANGE UP (ref 1.6–2.6)
MCHC RBC-ENTMCNC: 28 PG — SIGNIFICANT CHANGE UP (ref 27–34)
MCHC RBC-ENTMCNC: 31.2 GM/DL — LOW (ref 32–36)
MCV RBC AUTO: 89.9 FL — SIGNIFICANT CHANGE UP (ref 80–100)
METHOD TYPE: SIGNIFICANT CHANGE UP
MONOCYTES # BLD AUTO: 0.82 K/UL — SIGNIFICANT CHANGE UP (ref 0–0.9)
MONOCYTES NFR BLD AUTO: 9.3 % — SIGNIFICANT CHANGE UP (ref 2–14)
MRSA SPEC QL CULT: SIGNIFICANT CHANGE UP
MSSA DNA SPEC QL NAA+PROBE: SIGNIFICANT CHANGE UP
N MEN ISLT CULT: SIGNIFICANT CHANGE UP
NEUTROPHILS # BLD AUTO: 6.62 K/UL — SIGNIFICANT CHANGE UP (ref 1.8–7.4)
NEUTROPHILS NFR BLD AUTO: 75.3 % — SIGNIFICANT CHANGE UP (ref 43–77)
NRBC # BLD: 0 /100 WBCS — SIGNIFICANT CHANGE UP (ref 0–0)
OB PNL STL: NEGATIVE — SIGNIFICANT CHANGE UP
P AERUGINOSA DNA BLD POS NAA+NON-PROBE: SIGNIFICANT CHANGE UP
PHOSPHATE SERPL-MCNC: 2.3 MG/DL — LOW (ref 2.5–4.5)
PLATELET # BLD AUTO: 321 K/UL — SIGNIFICANT CHANGE UP (ref 150–400)
POTASSIUM SERPL-MCNC: 3.9 MMOL/L — SIGNIFICANT CHANGE UP (ref 3.5–5.3)
POTASSIUM SERPL-SCNC: 3.9 MMOL/L — SIGNIFICANT CHANGE UP (ref 3.5–5.3)
PROT SERPL-MCNC: 5.9 G/DL — LOW (ref 6–8.3)
PROTHROM AB SERPL-ACNC: 13.8 SEC — HIGH (ref 10–12.9)
RBC # BLD: 2.82 M/UL — LOW (ref 4.2–5.8)
RBC # BLD: 2.96 M/UL — LOW (ref 4.2–5.8)
RBC # FLD: 15.5 % — HIGH (ref 10.3–14.5)
RETICS #: 109.1 K/UL — SIGNIFICANT CHANGE UP (ref 25–125)
RETICS/RBC NFR: 3.9 % — HIGH (ref 0.5–2.5)
S MARCESCENS DNA BLD POS NAA+NON-PROBE: SIGNIFICANT CHANGE UP
S PNEUM DNA BLD POS QL NAA+NON-PROBE: SIGNIFICANT CHANGE UP
S PYO DNA BLD POS QL NAA+NON-PROBE: SIGNIFICANT CHANGE UP
SODIUM SERPL-SCNC: 139 MMOL/L — SIGNIFICANT CHANGE UP (ref 135–145)
T3 SERPL-MCNC: 68 NG/DL — LOW (ref 80–200)
TSH SERPL-MCNC: 1.33 UIU/ML — SIGNIFICANT CHANGE UP (ref 0.36–3.74)
WBC # BLD: 8.8 K/UL — SIGNIFICANT CHANGE UP (ref 3.8–10.5)
WBC # FLD AUTO: 8.8 K/UL — SIGNIFICANT CHANGE UP (ref 3.8–10.5)

## 2020-06-18 PROCEDURE — 99232 SBSQ HOSP IP/OBS MODERATE 35: CPT

## 2020-06-18 PROCEDURE — 99024 POSTOP FOLLOW-UP VISIT: CPT

## 2020-06-18 RX ORDER — ACETAMINOPHEN 500 MG
650 TABLET ORAL EVERY 6 HOURS
Refills: 0 | Status: DISCONTINUED | OUTPATIENT
Start: 2020-06-18 | End: 2020-06-24

## 2020-06-18 RX ORDER — TRAMADOL HYDROCHLORIDE 50 MG/1
25 TABLET ORAL EVERY 6 HOURS
Refills: 0 | Status: DISCONTINUED | OUTPATIENT
Start: 2020-06-18 | End: 2020-06-24

## 2020-06-18 RX ORDER — OXYCODONE HYDROCHLORIDE 5 MG/1
5 TABLET ORAL EVERY 6 HOURS
Refills: 0 | Status: DISCONTINUED | OUTPATIENT
Start: 2020-06-18 | End: 2020-06-24

## 2020-06-18 RX ORDER — MORPHINE SULFATE 50 MG/1
2 CAPSULE, EXTENDED RELEASE ORAL EVERY 6 HOURS
Refills: 0 | Status: DISCONTINUED | OUTPATIENT
Start: 2020-06-18 | End: 2020-06-24

## 2020-06-18 RX ORDER — POTASSIUM CHLORIDE 20 MEQ
10 PACKET (EA) ORAL DAILY
Refills: 0 | Status: DISCONTINUED | OUTPATIENT
Start: 2020-06-18 | End: 2020-06-24

## 2020-06-18 RX ORDER — TRAMADOL HYDROCHLORIDE 50 MG/1
25 TABLET ORAL EVERY 6 HOURS
Refills: 0 | Status: DISCONTINUED | OUTPATIENT
Start: 2020-06-18 | End: 2020-06-18

## 2020-06-18 RX ORDER — VANCOMYCIN HCL 1 G
1000 VIAL (EA) INTRAVENOUS ONCE
Refills: 0 | Status: COMPLETED | OUTPATIENT
Start: 2020-06-18 | End: 2020-06-18

## 2020-06-18 RX ADMIN — Medication 100 MILLIGRAM(S): at 22:32

## 2020-06-18 RX ADMIN — ATORVASTATIN CALCIUM 40 MILLIGRAM(S): 80 TABLET, FILM COATED ORAL at 22:31

## 2020-06-18 RX ADMIN — Medication 1 GRAM(S): at 16:49

## 2020-06-18 RX ADMIN — Medication 1 GRAM(S): at 05:52

## 2020-06-18 RX ADMIN — Medication 50 MILLIGRAM(S): at 05:05

## 2020-06-18 RX ADMIN — Medication 3: at 08:27

## 2020-06-18 RX ADMIN — Medication 325 MILLIGRAM(S): at 16:49

## 2020-06-18 RX ADMIN — OXYCODONE HYDROCHLORIDE 5 MILLIGRAM(S): 5 TABLET ORAL at 22:30

## 2020-06-18 RX ADMIN — LOSARTAN POTASSIUM 25 MILLIGRAM(S): 100 TABLET, FILM COATED ORAL at 05:06

## 2020-06-18 RX ADMIN — Medication 100 MILLIGRAM(S): at 13:07

## 2020-06-18 RX ADMIN — Medication 100 MILLIGRAM(S): at 13:10

## 2020-06-18 RX ADMIN — Medication 1000 MILLIGRAM(S): at 10:25

## 2020-06-18 RX ADMIN — OXYCODONE HYDROCHLORIDE 5 MILLIGRAM(S): 5 TABLET ORAL at 12:23

## 2020-06-18 RX ADMIN — Medication 10 MILLIEQUIVALENT(S): at 10:24

## 2020-06-18 RX ADMIN — FAMOTIDINE 20 MILLIGRAM(S): 10 INJECTION INTRAVENOUS at 05:06

## 2020-06-18 RX ADMIN — Medication 25 MILLIGRAM(S): at 05:05

## 2020-06-18 RX ADMIN — Medication 25 MILLIGRAM(S): at 22:32

## 2020-06-18 RX ADMIN — ISOSORBIDE MONONITRATE 30 MILLIGRAM(S): 60 TABLET, EXTENDED RELEASE ORAL at 05:06

## 2020-06-18 RX ADMIN — Medication 100 MILLIGRAM(S): at 05:06

## 2020-06-18 RX ADMIN — Medication 25 MILLIGRAM(S): at 13:07

## 2020-06-18 RX ADMIN — Medication 1 TABLET(S): at 10:25

## 2020-06-18 RX ADMIN — TRAMADOL HYDROCHLORIDE 25 MILLIGRAM(S): 50 TABLET ORAL at 10:09

## 2020-06-18 RX ADMIN — Medication 100 MILLIGRAM(S): at 05:05

## 2020-06-18 RX ADMIN — Medication 250 MILLIGRAM(S): at 10:57

## 2020-06-18 RX ADMIN — Medication 3: at 17:25

## 2020-06-18 RX ADMIN — Medication 325 MILLIGRAM(S): at 05:06

## 2020-06-18 RX ADMIN — Medication 4: at 12:28

## 2020-06-18 RX ADMIN — PANTOPRAZOLE SODIUM 40 MILLIGRAM(S): 20 TABLET, DELAYED RELEASE ORAL at 05:06

## 2020-06-18 RX ADMIN — Medication 1 GRAM(S): at 10:25

## 2020-06-18 NOTE — PROVIDER CONTACT NOTE (OTHER) - ASSESSMENT
pt had left surgery. Podiatry at bedside changing dressing. Pt complains of pain of a 9 on a scale of 1-10 .
pt has orders for ECHO cardio . Pt on remote tele bedside orders
pt to go off unit for echo. pt on remote tele at bedside .pt awake at this time no distress noted. Pt NSR this morning.
pt complains of pain of a 9 on a scale of 1-10.

## 2020-06-18 NOTE — PROGRESS NOTE ADULT - PROBLEM SELECTOR PLAN 1
admit  pan-culture  iv abx per ID  S/P emergent debridement and hemostasis  ID/podiatry consults  transfuse 2 units prbc

## 2020-06-18 NOTE — PROVIDER CONTACT NOTE (CRITICAL VALUE NOTIFICATION) - ACTION/TREATMENT ORDERED:
Call out. roe clerk Shahrzad left message with service awaiting call back at this time. Call out. roe clerk Shahrzad left message with service awaiting call back at this time. 0929 per med no new orders.

## 2020-06-18 NOTE — PROVIDER CONTACT NOTE (OTHER) - BACKGROUND
no pain medications ordered at this time
Only tylenol orders noted for pain of a 1-3  scale at this time.

## 2020-06-18 NOTE — PROGRESS NOTE ADULT - ASSESSMENT
49year old Male seen at [facility] ED for left foot TMA wound sp amputation with clean margins, readmitted with wound painful and foul smelling.     Revision of transmetatarsal amputation of left foot 16-Jun-2020 21:37:39  Toan Tavarez. with estimated 300ml blood loss IV discontinued, cath removed intact

## 2020-06-18 NOTE — PROGRESS NOTE ADULT - SUBJECTIVE AND OBJECTIVE BOX
49y year old Male seen during AM podiatry rounds s/p left foot TMA revision (DOS: 6/16/20). Patient relates no overnight events and states that they are doing well at this time. Denies any fever, chills, nausea, vomiting, chest pain, shortness of breath, or calf pain at this time.    Vital Signs Last 24 Hrs  T(C): 36.8 (18 Jun 2020 04:25), Max: 37.6 (17 Jun 2020 23:05)  T(F): 98.2 (18 Jun 2020 04:25), Max: 99.7 (17 Jun 2020 23:05)  HR: 61 (18 Jun 2020 04:25) (55 - 75)  BP: 116/67 (18 Jun 2020 04:25) (90/61 - 118/73)  BP(mean): 81 (17 Jun 2020 22:00) (69 - 85)  RR: 18 (18 Jun 2020 04:25) (17 - 27)  SpO2: 93% (18 Jun 2020 04:25) (91% - 97%)    PHYSICAL EXAM:  Vascular: DP & PT palpable, Digital hair absent. erythema and edema noted to the dorsal foot.  Neurological: Light touch sensation intact bilaterally  Musculoskeletal: s/p left TMA w/achilles tenotomy  Dermatological: Incision site of the left foot noted with intact sutures, no dehiscence, mild winston-incision erythema, no proximal streaking, no fluctuance, no malodor, no signs of infection at this time.                          8.3    8.80  )-----------( 321      ( 18 Jun 2020 09:20 )             26.6   06-18    139  |  106  |  10  ----------------------------<  261<H>  3.9   |  25  |  0.81    Ca    8.0<L>      18 Jun 2020 09:20  Phos  2.3     06-18  Mg     1.8     06-18    TPro  5.9<L>  /  Alb  2.1<L>  /  TBili  0.5  /  DBili  x   /  AST  10<L>  /  ALT  9<L>  /  AlkPhos  94  06-18    PT/INR - ( 18 Jun 2020 09:20 )   PT: 13.8 sec;   INR: 1.22 ratio         PTT - ( 16 Jun 2020 18:26 )  PTT:34.9 sec      Culture - Tissue with Gram Stain (06.17.20 @ 01:59)    Gram Stain:   Rare polymorphonuclear leukocytes seen per low power field  No organisms seen per oil power field    Specimen Source: .Tissue Other, left metatarsal bone            Imaging: left foot postop radiographs reveal s/p TMA

## 2020-06-18 NOTE — PROGRESS NOTE ADULT - SUBJECTIVE AND OBJECTIVE BOX
Patient is a 49y old  Male who presents with a chief complaint of left foot infection/bleeding (2020 13:16)      INTERVAL /OVERNIGHT EVENTS: c/o foot pain    MEDICATIONS  (STANDING):  ascorbic acid 1000 milliGRAM(s) Oral daily  atorvastatin 40 milliGRAM(s) Oral at bedtime  bethanechol 25 milliGRAM(s) Oral three times a day  ceFAZolin   IVPB 2000 milliGRAM(s) IV Intermittent every 8 hours  dextrose 5%. 1000 milliLiter(s) (50 mL/Hr) IV Continuous <Continuous>  dextrose 50% Injectable 12.5 Gram(s) IV Push once  dextrose 50% Injectable 25 Gram(s) IV Push once  dextrose 50% Injectable 25 Gram(s) IV Push once  famotidine    Tablet 20 milliGRAM(s) Oral every 24 hours  ferrous    sulfate 325 milliGRAM(s) Oral two times a day  insulin lispro (HumaLOG) corrective regimen sliding scale   SubCutaneous three times a day before meals  insulin lispro (HumaLOG) corrective regimen sliding scale   SubCutaneous at bedtime  isosorbide   mononitrate ER Tablet (IMDUR) 30 milliGRAM(s) Oral every 24 hours  losartan 25 milliGRAM(s) Oral daily  methimazole 10 milliGRAM(s) Oral daily  metoprolol tartrate 50 milliGRAM(s) Oral every 12 hours  multivitamin 1 Tablet(s) Oral daily  pantoprazole    Tablet 40 milliGRAM(s) Oral before breakfast  phenazopyridine 100 milliGRAM(s) Oral every 8 hours  potassium chloride    Tablet ER 10 milliEquivalent(s) Oral daily  senna 2 Tablet(s) Oral at bedtime  sucralfate 1 Gram(s) Oral four times a day  tamsulosin 0.8 milliGRAM(s) Oral at bedtime    MEDICATIONS  (PRN):  acetaminophen   Tablet .. 650 milliGRAM(s) Oral every 6 hours PRN Temp greater or equal to 38C (100.4F), Mild Pain (1 - 3)  bisacodyl Suppository 10 milliGRAM(s) Rectal daily PRN Constipation  dextrose 40% Gel 15 Gram(s) Oral once PRN Blood Glucose LESS THAN 70 milliGRAM(s)/deciliter  glucagon  Injectable 1 milliGRAM(s) IntraMuscular once PRN Glucose LESS THAN 70 milligrams/deciliter  morphine  - Injectable 2 milliGRAM(s) IV Push every 6 hours PRN breakthrough pain  ondansetron Injectable 4 milliGRAM(s) IV Push once PRN Nausea and/or Vomiting  oxyCODONE    IR 5 milliGRAM(s) Oral every 6 hours PRN Severe Pain (7 - 10)  traMADol 25 milliGRAM(s) Oral every 6 hours PRN Moderate Pain (4 - 6)      Allergies    fish (Hives)  No Known Drug Allergies    Intolerances        REVIEW OF SYSTEMS:  CONSTITUTIONAL: No fever, weight loss, or fatigue  EYES: No eye pain, visual disturbances, or discharge  ENMT:  No difficulty hearing, tinnitus, vertigo; No sinus or throat pain  NECK: No pain or stiffness  RESPIRATORY: No cough, wheezing, chills or hemoptysis; No shortness of breath  CARDIOVASCULAR: No chest pain, palpitations, dizziness, or leg swelling  GASTROINTESTINAL: No abdominal or epigastric pain. No nausea, vomiting, or hematemesis; No diarrhea or constipation. No melena or hematochezia.  GENITOURINARY: No dysuria, frequency, hematuria, or incontinence  NEUROLOGICAL: No headaches, memory loss, loss of strength, numbness, or tremors  SKIN: No itching, burning, rashes, or lesions   LYMPH NODES: No enlarged glands  ENDOCRINE: No heat or cold intolerance; No hair loss; No polydipsia or polyuria  MUSCULOSKELETAL: No joint pain or swelling; No muscle, back, or extremity pain  PSYCHIATRIC: No depression, anxiety, mood swings, or difficulty sleeping  HEME/LYMPH: No easy bruising, or bleeding gums  ALLERGY AND IMMUNOLOGIC: No hives or eczema    Vital Signs Last 24 Hrs  T(C): 36.9 (2020 12:34), Max: 37.6 (2020 23:05)  T(F): 98.5 (2020 12:34), Max: 99.7 (2020 23:05)  HR: 58 (2020 12:34) (58 - 75)  BP: 112/64 (2020 12:34) (111/57 - 118/73)  BP(mean): 81 (2020 22:00) (76 - 85)  RR: 16 (2020 12:34) (16 - 27)  SpO2: 92% (2020 12:34) (91% - 97%)    PHYSICAL EXAM:  GENERAL: NAD, well-groomed, well-developed  HEAD:  Atraumatic, Normocephalic  EYES: EOMI, PERRLA, conjunctiva and sclera clear  ENMT: No tonsillar erythema, exudates, or enlargement; Moist mucous membranes, Good dentition, No lesions  NECK: Supple, No JVD, Normal thyroid  NERVOUS SYSTEM:  Alert & Oriented X3, Good concentration; Motor Strength 5/5 B/L upper and lower extremities; DTRs 2+ intact and symmetric  CHEST/LUNG: Clear to auscultation bilaterally; No rales, rhonchi, wheezing, or rubs  HEART: Regular rate and rhythm; No murmurs, rubs, or gallops  ABDOMEN: Soft, Nontender, Nondistended; Bowel sounds present  EXTREMITIES:  2+ Peripheral Pulses, No clubbing, cyanosis, or edema  LYMPH: No lymphadenopathy noted  SKIN: No rashes or lesions    LABS:                        8.3    8.80  )-----------( 321      ( 2020 09:20 )             26.6     2020 09:20    139    |  106    |  10     ----------------------------<  261    3.9     |  25     |  0.81     Ca    8.0        2020 09:20  Phos  2.3       2020 09:20  Mg     1.8       2020 09:20    TPro  5.9    /  Alb  2.1    /  TBili  0.5    /  DBili  x      /  AST  10     /  ALT  9      /  AlkPhos  94     2020 09:20    PT/INR - ( 2020 09:20 )   PT: 13.8 sec;   INR: 1.22 ratio         PTT - ( 2020 18:26 )  PTT:34.9 sec  Urinalysis Basic - ( 2020 00:22 )    Color: Yellow / Appearance: very cloudy / S.010 / pH: x  Gluc: x / Ketone: Trace  / Bili: Negative / Urobili: Negative   Blood: x / Protein: 100 / Nitrite: Negative   Leuk Esterase: Moderate / RBC: 0-2 /HPF / WBC >50   Sq Epi: x / Non Sq Epi: Occasional / Bacteria: Moderate      CAPILLARY BLOOD GLUCOSE      POCT Blood Glucose.: 313 mg/dL (2020 12:09)  POCT Blood Glucose.: 258 mg/dL (2020 08:13)  POCT Blood Glucose.: 298 mg/dL (2020 21:33)  POCT Blood Glucose.: 268 mg/dL (2020 17:24)      RADIOLOGY & ADDITIONAL TESTS:    Notes Reviewed:  [ x] YES  [ ] NO    Care Discussed with Consultants/Other Providers [x ] YES  [ ] NO

## 2020-06-18 NOTE — PROGRESS NOTE ADULT - ASSESSMENT
49M PMH CAD s/p BMS to RCA (8/2019), RUL subsegmental PE (6/2019, on Eliquis), hx of NSTEMI and PAF s/p ex-lap omentopexy and plication for perforated antral ulcer (5/2019),  CVA s/p tPA (8/2019), HTN and DM2, osteomyelitis, s/p debridement, s/p left TMA 5/2020, without complication p/w bleeding from wound.   Reports that for the last month he has been bleeding from his wound.  He has several units of blood transfused two days ago.  He went to wound care and his wound was felt to be foul smelling/draining.  He was referred to the ER for eval and admission.  In the er his bp was initially marginal.  He dropped his bp to 90/40, and had two episodes of bleeding from the wound, which was wrapped with a pressure dressing.  He developed chest discomfort.  He is getting fluids and blood has been ordered     CAD  - There was initially no evidence of acute ischemia or overload   - Patient with known CAD, has BMS to prox RCA 8/2019, and an occluded RPLs  not intervened upon. Pt known severe LVH with normal LVEF   - Echo 12/24 as delineated above revealing severe LVH, normal systolic function ef 65% grade 2 diastolic dysfunction, mild MR, trace TR   - Currently CP free.   - Currently off of all antiplatelets  - Resume asa when able given PCI.   - Continue Imdur, losartan, metoprolol  - Continue statin	  - watch for HFpEF     A fibrillation   - Has hx of pA fib and PE, on ac  - HR: 61 (06-18 @ 04:25) (55 - 75) per flow sheets   - Resume AC when tolerated  - Continue BB    Hypertension  - BP: 116/67 (06-18-20 @ 04:25) (102/55 - 118/73)  - Continue Imdur, losartan, metoprolol  - Monitor routine hemodynamics     Anemia   - Hemoglobin: 8.3 (06-18-20 @ 09:20) Hematocrit: 26.6 (06-18-20 @ 09:20)  - Transfuse prn with PRBC to keep Hb >8 given CAD    Osteomyelitis Foot  - Per primary/podiatry   - Follow ID recommendations.     - Monitor and replete lytes, keep K>4, Mg>2.  - All other medical needs as per primary team.  - Other cardiovascular workup will depend on clinical course.  - Will continue to follow.    Danny Arce, MS FNP, [facility]  Nurse Practitioner- Cardiology   Spectra #[phone]

## 2020-06-18 NOTE — PROVIDER CONTACT NOTE (OTHER) - ACTION/TREATMENT ORDERED:
left message on voice mail awaiting response back at this time.
per md pt can go off unit without monitor. No new orders.
call out to md at this time by rosa roe clerk. awaiting call back. [phone] per md ok for telephone orders for tramadol 25mg Po Q 6. no other new orders.
per md he will order medications. No new orders at this time

## 2020-06-18 NOTE — PROGRESS NOTE ADULT - SUBJECTIVE AND OBJECTIVE BOX
[facility] -- Bolivar Carbajal Grossman, [place], Reynaldo Sweeney Savella, Goodger: Office # [phone]    Follow Up:  A fib     Subjective/Observations: Patient seen and examined. Patient awake and alert, resting comfortably in bed. No complaints of chest pain, SOB, LE edema, cough. No signs of orthopnea or PND. No bleeding from foot at present     REVIEW OF SYSTEMS: All review of systems is negative for eye, ENT, GI, , allergic, dermatologic, musculoskeletal and neurologic except as described above    PAST MEDICAL & SURGICAL HISTORY:  Cerebrovascular accident  CAD S/P percutaneous coronary angioplasty  Osteomyelitis: s/p debridement  History of non-ST elevation myocardial infarction (NSTEMI)  Pulmonary embolism  Perforated gastric ulcer: s/p emergent ex-lap omentopexy and plication 6/2019  BPH (benign prostatic hyperplasia)  Hypertension  Hypertension  Afib  Diabetes mellitus with no complication  Diabetes  Traumatic amputation of left foot, initial encounter  Perforated gastric ulcer  H/O abdominal surgery    MEDICATIONS  (STANDING):  ascorbic acid 1000 milliGRAM(s) Oral daily  atorvastatin 40 milliGRAM(s) Oral at bedtime  bethanechol 25 milliGRAM(s) Oral three times a day  ceFAZolin   IVPB 2000 milliGRAM(s) IV Intermittent every 8 hours  dextrose 5%. 1000 milliLiter(s) (50 mL/Hr) IV Continuous <Continuous>  dextrose 50% Injectable 12.5 Gram(s) IV Push once  dextrose 50% Injectable 25 Gram(s) IV Push once  dextrose 50% Injectable 25 Gram(s) IV Push once  famotidine    Tablet 20 milliGRAM(s) Oral every 24 hours  ferrous    sulfate 325 milliGRAM(s) Oral two times a day  insulin lispro (HumaLOG) corrective regimen sliding scale   SubCutaneous three times a day before meals  insulin lispro (HumaLOG) corrective regimen sliding scale   SubCutaneous at bedtime  isosorbide   mononitrate ER Tablet (IMDUR) 30 milliGRAM(s) Oral every 24 hours  losartan 25 milliGRAM(s) Oral daily  methimazole 10 milliGRAM(s) Oral daily  metoprolol tartrate 50 milliGRAM(s) Oral every 12 hours  multivitamin 1 Tablet(s) Oral daily  pantoprazole    Tablet 40 milliGRAM(s) Oral before breakfast  phenazopyridine 100 milliGRAM(s) Oral every 8 hours  potassium chloride    Tablet ER 10 milliEquivalent(s) Oral daily  senna 2 Tablet(s) Oral at bedtime  sucralfate 1 Gram(s) Oral four times a day  tamsulosin 0.8 milliGRAM(s) Oral at bedtime  vancomycin  IVPB 1000 milliGRAM(s) IV Intermittent once    MEDICATIONS  (PRN):  acetaminophen   Tablet .. 650 milliGRAM(s) Oral every 6 hours PRN Temp greater or equal to 38C (100.4F), Mild Pain (1 - 3)  bisacodyl Suppository 10 milliGRAM(s) Rectal daily PRN Constipation  dextrose 40% Gel 15 Gram(s) Oral once PRN Blood Glucose LESS THAN 70 milliGRAM(s)/deciliter  glucagon  Injectable 1 milliGRAM(s) IntraMuscular once PRN Glucose LESS THAN 70 milligrams/deciliter  ondansetron Injectable 4 milliGRAM(s) IV Push once PRN Nausea and/or Vomiting  traMADol 25 milliGRAM(s) Oral every 6 hours PRN Severe Pain (7 - 10)    Allergies  fish (Hives)  No Known Drug Allergies    Vital Signs Last 24 Hrs  T(C): 36.8 (18 Jun 2020 04:25), Max: 37.6 (17 Jun 2020 23:05)  T(F): 98.2 (18 Jun 2020 04:25), Max: 99.7 (17 Jun 2020 23:05)  HR: 61 (18 Jun 2020 04:25) (55 - 75)  BP: 116/67 (18 Jun 2020 04:25) (102/55 - 118/73)  BP(mean): 81 (17 Jun 2020 22:00) (74 - 85)  RR: 18 (18 Jun 2020 04:25) (17 - 27)  SpO2: 93% (18 Jun 2020 04:25) (91% - 97%)    I&O's Summary    17 Jun 2020 07:01  -  18 Jun 2020 07:00  --------------------------------------------------------  IN: 1880 mL / OUT: 3780 mL / NET: -1900 mL    TELE: Not on telemetry   PHYSICAL EXAM:  Appearance: NAD, no distress, alert,  HEENT: Moist Mucous Membranes, Anicteric  Cardiovascular: Regular rate and rhythm, Normal S1 S2, No JVD, No murmurs, No rubs, gallops or clicks  Respiratory: Non-labored, Clear to auscultation, No rales, No rhonchi, No wheezing.   Gastrointestinal:  Soft, Non-tender, + BS  Neurologic: Non-focal  Skin: Warm and dry, + Lt foot DSD, No ecchymosis, No cyanosis  Musculoskeletal: No clubbing, No cyanosis, No joint swelling/tenderness  Psychiatry: Mood & affect appropriate  Lymph: No peripheral edema.     LABS: All Labs Reviewed:                        8.3    8.80  )-----------( 321      ( 18 Jun 2020 09:20 )             26.6                         8.5    x     )-----------( x        ( 17 Jun 2020 13:58 )             26.7                         7.5    10.68 )-----------( 327      ( 17 Jun 2020 07:02 )             23.8     18 Jun 2020 09:20    139    |  106    |  10     ----------------------------<  261    3.9     |  25     |  0.81   17 Jun 2020 07:02    140    |  107    |  12     ----------------------------<  128    3.7     |  25     |  0.79   16 Jun 2020 10:53    139    |  106    |  11     ----------------------------<  124    4.3     |  27     |  0.92     Ca    8.0        18 Jun 2020 09:20  Ca    7.9        17 Jun 2020 07:02  Ca    8.7        16 Jun 2020 10:53  Phos  2.3       18 Jun 2020 09:20  Phos  2.9       17 Jun 2020 07:02  Mg     1.8       18 Jun 2020 09:20  Mg     1.8       17 Jun 2020 07:02    TPro  5.9    /  Alb  2.1    /  TBili  0.5    /  DBili  x      /  AST  10     /  ALT  9      /  AlkPhos  94     18 Jun 2020 09:20  TPro  6.5    /  Alb  2.3    /  TBili  0.3    /  DBili  x      /  AST  10     /  ALT  12     /  AlkPhos  108    16 Jun 2020 10:53    PT/INR - ( 18 Jun 2020 09:20 )   PT: 13.8 sec;   INR: 1.22 ratio    PTT - ( 16 Jun 2020 18:26 )  PTT:34.9 sec  Lactate, Blood: 0.9 mmol/L (06-17-20 @ 07:02)  Lactate, Blood: 3.7 mmol/L (06-16-20 @ 10:53)    12 Lead ECG:   Ventricular Rate 61 BPM  Atrial Rate 61 BPM  P-R Interval 146 ms  QRS Duration 82 ms  Q-T Interval 416 ms  QTC Calculation(Bezet) 418 ms  P Axis 42 degrees  R Axis 19 degrees  T Axis 54 degrees  Diagnosis Line Normal sinus rhythm  Normal ECG  When compared with ECG of 16-JUN-2020 11:37,  No significant change was found  Confirmed by DAT CALLAHAN ([id number]) on 6/17/2020 4:51:28 PM (06-17-20 @ 08:18)    < from: TTE Echo Doppler w/o Cont (12.24.19 @ 13:55) >  Dimensions:    LA 4.2       Normal Values: 2.0 - 4.0 cm    Ao 4.0        Normal Values: 2.0 - 3.8 cm  SEPTUM 1.6       Normal Values: 0.6 - 1.2 cm  PWT 1.6       Normal Values: 0.6 - 1.1 cm  LVIDd 5.8         Normal Values: 3.0 - 5.6 cm  LVIDs 3.2         Normal Values: 1.8 - 4.0 cm    Derived Variables:  LVMI g/m2  RWT  Fractional Short  Ejection Fraction    Doppler Peak v. AoV= (m/sec)    OBSERVATIONS:    Mitral Valve: normal, mild MR.  Aortic Valve/Aorta: normal trileaflet aortic valve.  Tricuspid Valve: normal with trace TR.  Pulmonic Valve: normal  Left Atrium: Enlarged  Right Atrium: normal  Left Ventricle: Severe concentric LVH with normal systolic function, estimated LVEF of 65%.  Right Ventricle: normal size and systolic function.  Pericardium/Pleura: no significant pleural effusion, no significant pericardial effusion.  Pulmonary/RV Pressure: Inadequate TR jet to estimate PA systolic pressure  LV Diastolic Function: Grade 2diastolic dysfunction.    Severe concentric LVH, and mild LV enlargement, with normal systolic function, estimated LVEF of 65%. Normal right ventricular size and systolic function. Grade 2diastolic dysfunction. Left atrial enlargement The aortic root is normal in size. The mitral valve is structurally normal, mild MR. The aortic valve is trileaflet without stenosis or insufficiency. Trace physiologic TR. Inadequate TR jet to estimate PA systolic pressure No significant pericardial effusion.        < end of copied text >    < from: Cardiac Cath Lab - Adult (08.02.19 @ 08:55) >  VENTRICLES: No left ventriculogram was performed.  CORONARY VESSELS: The coronary circulation is right dominant.  RCA:   --  Proximal RCA: There was a 95 % stenosis.  COMPLICATIONS: There were no complications.  INTERVENTIONAL IMPRESSIONS: Successful stenting of the RCA with BMS  INTERVENTIONAL RECOMMENDATIONS: Aspirin for 1 week. Plavix for 3 weeks. Eliquis for PE (duration TBD) - limit triple therapy for 1 week then continue with plavix eliquis for 3 weeks. This modified DAPT regimen is due to his recent perforated ulcer and surgery.    < end of copied text >    < from: Cardiac Cath Lab - Adult (08.01.19 @ 14:11) >  (Isoptin, Calan, Covera), 2.5 mg, IA. Heparin, 3000 units, IA.  VENTRICLES: No left ventriculogram was performed.  CORONARY VESSELS: The coronary circulation is right dominant.  LM:   --  LM: Normal.  LAD:   --  Mid LAD: There was a tubular 30 % stenosis.  --  D1: There was a tubular 40 % stenosis.  CX:   --  Circumflex: Angiography showed minor luminal irregularities with no flow limiting lesions.  --  OM1: Angiography showed minor luminal irregularities with no flow limiting lesions.  --  OM2: Angiography showed minor luminal irregularities with no flow limiting lesions.  RCA:   --  Proximal RCA: There was a 95 % stenosis.  --  RPLS: There was a 100 % stenosis.  COMPLICATIONS: There were no complications.  DIAGNOSTIC IMPRESSIONS: Severe RCA disease (95% prox disease, occluded  RPL). Mild LCx and Mild LAD disease.  DIAGNOSTIC RECOMMENDATIONS: Will review with GI/Surgery risk of triple therapy prior to proceeding with PCI    < end of copied text >    < from: Xray Chest 1 View AP/PA (06.16.20 @ 10:04) >  FINDINGS:  Lungs:There are no lung consolidations. Small left pleural effusion.  Heart: The heart is normal in size.  Mediastinum: The mediastinum is within normal limits.    IMPRESSION:  No lung consolidations.  Small left pleural effusion.    < end of copied text >

## 2020-06-18 NOTE — PROGRESS NOTE ADULT - SUBJECTIVE AND OBJECTIVE BOX
infectious diseases progress note:    SARAH MORRISON is a 49y y. o. Male patient    No concerning overnight events    Allergies    fish (Hives)  No Known Drug Allergies    Intolerances        ANTIBIOTICS/RELEVANT:  antimicrobials  ceFAZolin   IVPB 2000 milliGRAM(s) IV Intermittent every 8 hours    immunologic:    OTHER:  acetaminophen   Tablet .. 650 milliGRAM(s) Oral every 6 hours PRN  ascorbic acid 1000 milliGRAM(s) Oral daily  atorvastatin 40 milliGRAM(s) Oral at bedtime  bethanechol 25 milliGRAM(s) Oral three times a day  bisacodyl Suppository 10 milliGRAM(s) Rectal daily PRN  dextrose 40% Gel 15 Gram(s) Oral once PRN  dextrose 5%. 1000 milliLiter(s) IV Continuous <Continuous>  dextrose 50% Injectable 12.5 Gram(s) IV Push once  dextrose 50% Injectable 25 Gram(s) IV Push once  dextrose 50% Injectable 25 Gram(s) IV Push once  famotidine    Tablet 20 milliGRAM(s) Oral every 24 hours  ferrous    sulfate 325 milliGRAM(s) Oral two times a day  glucagon  Injectable 1 milliGRAM(s) IntraMuscular once PRN  insulin lispro (HumaLOG) corrective regimen sliding scale   SubCutaneous three times a day before meals  insulin lispro (HumaLOG) corrective regimen sliding scale   SubCutaneous at bedtime  isosorbide   mononitrate ER Tablet (IMDUR) 30 milliGRAM(s) Oral every 24 hours  losartan 25 milliGRAM(s) Oral daily  methimazole 10 milliGRAM(s) Oral daily  metoprolol tartrate 50 milliGRAM(s) Oral every 12 hours  morphine  - Injectable 2 milliGRAM(s) IV Push every 6 hours PRN  multivitamin 1 Tablet(s) Oral daily  ondansetron Injectable 4 milliGRAM(s) IV Push once PRN  oxyCODONE    IR 5 milliGRAM(s) Oral every 6 hours PRN  pantoprazole    Tablet 40 milliGRAM(s) Oral before breakfast  phenazopyridine 100 milliGRAM(s) Oral every 8 hours  potassium chloride    Tablet ER 10 milliEquivalent(s) Oral daily  senna 2 Tablet(s) Oral at bedtime  sucralfate 1 Gram(s) Oral four times a day  tamsulosin 0.8 milliGRAM(s) Oral at bedtime  traMADol 25 milliGRAM(s) Oral every 6 hours PRN      Objective:  Vital Signs Last 24 Hrs  T(C): 36.8 (2020 04:25), Max: 37.6 (2020 23:05)  T(F): 98.2 (2020 04:25), Max: 99.7 (2020 23:05)  HR: 61 (2020 04:25) (55 - 75)  BP: 116/67 (2020 04:25) (102/55 - 118/73)  BP(mean): 81 (2020 22:00) (74 - 85)  RR: 18 (2020 04:25) (17 - 27)  SpO2: 93% (:25) (91% - 97%)    T(C): 36.8 (20 @ 04:25), Max: 38.6 (20 @ 21:22)  T(C): 36.8 (20 @ 04:25), Max: 38.6 (20 @ 21:22)  T(C): 36.8 (20 @ 04:25), Max: 38.6 (20 @ 21:22)    PHYSICAL EXAM:  HEENT: NC atraumatic  Neck: supple  Respiratory: no accessory muscle use, breathing comfortably  Cardiovascular: distant  Gastrointestinal: normal appearing, nondistended  Extremities: no clubbing, no cyanosis, foot is wrapped      LABS:                          8.3    8.80  )-----------( 321      ( 2020 09:20 )             26.6       8.80  @ 09:20  10.68 06-17 @ 07:02  16.09 06-16 @ 18:26  17.61 06-16 @ 12:23  9.14 -16 @ 10:53      06-18    139  |  106  |  10  ----------------------------<  261<H>  3.9   |  25  |  0.81    Ca    8.0<L>      2020 09:20  Phos  2.3       Mg     1.8         TPro  5.9<L>  /  Alb  2.1<L>  /  TBili  0.5  /  DBili  x   /  AST  10<L>  /  ALT  9<L>  /  AlkPhos  94        Creatinine, Serum: 0.81 mg/dL (20 @ 09:20)  Creatinine, Serum: 0.79 mg/dL (20 @ 07:02)  Creatinine, Serum: 0.92 mg/dL (20 @ 10:53)      PT/INR - ( 2020 09:20 )   PT: 13.8 sec;   INR: 1.22 ratio         PTT - ( 2020 18:26 )  PTT:34.9 sec  Urinalysis Basic - ( 2020 00:22 )    Color: Yellow / Appearance: very cloudy / S.010 / pH: x  Gluc: x / Ketone: Trace  / Bili: Negative / Urobili: Negative   Blood: x / Protein: 100 / Nitrite: Negative   Leuk Esterase: Moderate / RBC: 0-2 /HPF / WBC >50   Sq Epi: x / Non Sq Epi: Occasional / Bacteria: Moderate            INFLAMMATORY MARKERS  Auto Neutrophil #: 6.62 K/uL (20 @ 09:20)  Auto Lymphocyte #: 1.06 K/uL (20 @ 09:20)  Auto Neutrophil #: 8.15 K/uL (20 @ 07:02)  Auto Lymphocyte #: 1.36 K/uL (20 @ 07:02)  Auto Lymphocyte #: 1.11 K/uL (20 @ 10:53)  Auto Neutrophil #: 6.92 K/uL (20 @ 10:53)    Lactate, Blood: 0.9 mmol/L (20 @ 07:02)  Lactate, Blood: 3.7 mmol/L (20 @ 10:53)    Auto Eosinophil #: 0.21 K/uL (20 @ 09:20)  Auto Eosinophil #: 0.14 K/uL (20 @ 07:02)  Auto Eosinophil #: 0.20 K/uL (20 @ 10:53)      Sedimentation Rate, Erythrocyte: 42 mm/hr (20 @ 10:53)      INR: 1.22 ratio (20 @ 09:20)  INR: 1.38 ratio (20 @ 07:02)  INR: 1.40 ratio (20 @ 18:26)  Activated Partial Thromboplastin Time: 34.9 sec (20 @ 18:26)  Activated Partial Thromboplastin Time: 35.2 sec (20 @ 10:53)  INR: 1.55 ratio (20 @ 10:53)          MICROBIOLOGY:    Culture - Tissue with Gram Stain (20 @ 01:59)    Gram Stain:   Rare polymorphonuclear leukocytes seen per low power field  No organisms seen per oil power field    Specimen Source: .Tissue Other, left metatarsal bone    Culture Results:   Rare Staphylococcus aureus  Rare Enterococcus avium  Rare Enterococcus faecalis    Organism Identification: Enterococcus avium  Enterococcus faecalis    Organism: Enterococcus avium    Organism: Enterococcus faecalis    Method Type: BAYLEE    Method Type: BAYLEE    Culture - Blood (20 @ 16:36)    Gram Stain:   Growth in anaerobic bottle: Gram Positive Cocci in Clusters    -  Multidrug (KPC pos) resistant organism: Nondet    -  Staphylococcus aureus: Nondet    -  Methicillin resistant Staphylococcus aureus (MRSA): Nondet    -  Coagulase negative Staphylococcus: Nondet    -  Enterococcus species: Nondet    -  Vancomycin resistant Enterococcus sp.: Nondet    -  Escherichia coli: Nondet    -  Klebsiella oxytoca: Nondet    -  Klebsiella pneumoniae: Nondet    -  Serratia marcescens: Nondet    -  Haemophilus influenzae: Nondet    -  Listeria monocytogenes: Nondet    -  Neisseria meningitidis: Nondet    -  Pseudomonas aeruginosa: Nondet    -  Acinetobacter baumanii: Nondet    -  Enterobacter cloacae complex: Nondet    -  Streptococcus sp. (Not Grp A, B or S pneumoniae): Nondet    -  Streptococcus agalactiae (Group B): Nondet    -  Streptococcus pyogenes (Group A): Nondet    -  Streptococcus pneumoniae: Nondet    -  Candida albicans: Nondet    -  Candida glabrata: Nondet    -  Candida krusei: Nondet    -  Candida parapsilosis: Nondet    -  Candida tropicalis: Nondet    Specimen Source: .Blood Blood-Peripheral    Organism: Blood Culture PCR    Culture Results:   Growth in anaerobic bottle: Gram Positive Cocci in Clusters        RADIOLOGY & ADDITIONAL STUDIES:

## 2020-06-18 NOTE — PROGRESS NOTE ADULT - ASSESSMENT
Assessment: 49 y.o M  s/p TMA Revision (DOS: 6/16/20)    Plan:  Patient examined and evaluated. Case discussed with Dr. Todd  Surgical site dressed with adaptic and dry, sterile dressing.  Patient is to be non weight bearing to the left lower extremity.  Surgical pathology and cultures pending at this time.  Antibiotics as per ID recommendations.

## 2020-06-18 NOTE — CONSULT NOTE ADULT - SUBJECTIVE AND OBJECTIVE BOX
Hematology/Oncology consult     Patient is seen and examined  notes/labs reviewed  pt family is at bedside and d/w family.  consult dictated,  Job #    contact #  son/daughter----  phone #    IMPRESSION:      RECOMMENDATION:              ,thanks for courtsey of this consult,will follow this pt with you for hem/onc issue while pt is in hospital. Hematology/Oncology consult     Patient is seen and examined  notes/labs reviewed  consult dictated,  Job # [id number]      IMPRESSION:  49/m with multiple co morbid medical history, including hx of cad, s/p mi, h/o pe, h/o a.fibb, h/o abd surgery for perforated gastric ulcer in past who had left foot TMA surgery around 5/20/2020, was admitted around 6/16 for pain, bleeding and redness at wound site, seen by podiatry and underwent revision left foot TMA 6/16/2020 (estimated 300 ml blood loss per surgery), had 3 units prbc , 1 unit platelets and 1 unit ffp this admission, hb was around 10.5 this admission, getting treatment for possible OM as per id and is on abx as per id    RECOMMENDATION:  Anemia--seems multifactorial, likely from blood loss/post surgery, blood loss from wound site with revision tma 6/16, will initiate work up to exclude treatable etiology  iron.ferritin, b12, folate, retic, haptoglobin, fobt  monitor h/h--transfuse prbc as needed for symptomatic anemia  gi/dvtp as per podiatry/surgery  case d/w pt at bedside and is in agreement  Dr Brizuela ,thanks for this consult, will follow this pt with you for hem/onc issue while pt is in hospital.

## 2020-06-19 LAB
-  AMIKACIN: SIGNIFICANT CHANGE UP
-  AMIKACIN: SIGNIFICANT CHANGE UP
-  AMPICILLIN/SULBACTAM: SIGNIFICANT CHANGE UP
-  AMPICILLIN: SIGNIFICANT CHANGE UP
-  AMPICILLIN: SIGNIFICANT CHANGE UP
-  AZTREONAM: SIGNIFICANT CHANGE UP
-  AZTREONAM: SIGNIFICANT CHANGE UP
-  CEFAZOLIN: SIGNIFICANT CHANGE UP
-  CEFEPIME: SIGNIFICANT CHANGE UP
-  CEFEPIME: SIGNIFICANT CHANGE UP
-  CEFTAZIDIME: SIGNIFICANT CHANGE UP
-  CEFTAZIDIME: SIGNIFICANT CHANGE UP
-  CIPROFLOXACIN: SIGNIFICANT CHANGE UP
-  CIPROFLOXACIN: SIGNIFICANT CHANGE UP
-  CLINDAMYCIN: SIGNIFICANT CHANGE UP
-  ERYTHROMYCIN: SIGNIFICANT CHANGE UP
-  GENTAMICIN: SIGNIFICANT CHANGE UP
-  IMIPENEM: SIGNIFICANT CHANGE UP
-  IMIPENEM: SIGNIFICANT CHANGE UP
-  LEVOFLOXACIN: SIGNIFICANT CHANGE UP
-  LEVOFLOXACIN: SIGNIFICANT CHANGE UP
-  MEROPENEM: SIGNIFICANT CHANGE UP
-  MEROPENEM: SIGNIFICANT CHANGE UP
-  OXACILLIN: SIGNIFICANT CHANGE UP
-  PENICILLIN: SIGNIFICANT CHANGE UP
-  PIPERACILLIN/TAZOBACTAM: SIGNIFICANT CHANGE UP
-  PIPERACILLIN/TAZOBACTAM: SIGNIFICANT CHANGE UP
-  RIFAMPIN: SIGNIFICANT CHANGE UP
-  TETRACYCLINE: SIGNIFICANT CHANGE UP
-  TOBRAMYCIN: SIGNIFICANT CHANGE UP
-  TOBRAMYCIN: SIGNIFICANT CHANGE UP
-  TRIMETHOPRIM/SULFAMETHOXAZOLE: SIGNIFICANT CHANGE UP
-  VANCOMYCIN: SIGNIFICANT CHANGE UP
CULTURE RESULTS: SIGNIFICANT CHANGE UP
FERRITIN SERPL-MCNC: 86 NG/ML — SIGNIFICANT CHANGE UP (ref 30–400)
FOLATE SERPL-MCNC: 13.8 NG/ML — SIGNIFICANT CHANGE UP
HAPTOGLOB SERPL-MCNC: 271 MG/DL — HIGH (ref 34–200)
HCT VFR BLD CALC: 26.6 % — LOW (ref 39–50)
HCT VFR BLD CALC: 27.1 % — LOW (ref 39–50)
HGB BLD-MCNC: 8.5 G/DL — LOW (ref 13–17)
HGB BLD-MCNC: 8.5 G/DL — LOW (ref 13–17)
IRON SATN MFR SERPL: 13 UG/DL — LOW (ref 45–165)
IRON SATN MFR SERPL: 7 % — LOW (ref 16–55)
MCHC RBC-ENTMCNC: 28.4 PG — SIGNIFICANT CHANGE UP (ref 27–34)
MCHC RBC-ENTMCNC: 31.4 GM/DL — LOW (ref 32–36)
MCV RBC AUTO: 90.6 FL — SIGNIFICANT CHANGE UP (ref 80–100)
METHOD TYPE: SIGNIFICANT CHANGE UP
NRBC # BLD: 0 /100 WBCS — SIGNIFICANT CHANGE UP (ref 0–0)
ORGANISM # SPEC MICROSCOPIC CNT: SIGNIFICANT CHANGE UP
PLATELET # BLD AUTO: 367 K/UL — SIGNIFICANT CHANGE UP (ref 150–400)
RBC # BLD: 2.99 M/UL — LOW (ref 4.2–5.8)
RBC # FLD: 15.9 % — HIGH (ref 10.3–14.5)
SPECIMEN SOURCE: SIGNIFICANT CHANGE UP
TIBC SERPL-MCNC: 201 UG/DL — LOW (ref 220–430)
UIBC SERPL-MCNC: 188 UG/DL — SIGNIFICANT CHANGE UP (ref 110–370)
VIT B12 SERPL-MCNC: 342 PG/ML — SIGNIFICANT CHANGE UP (ref 232–1245)
WBC # BLD: 8.88 K/UL — SIGNIFICANT CHANGE UP (ref 3.8–10.5)
WBC # FLD AUTO: 8.88 K/UL — SIGNIFICANT CHANGE UP (ref 3.8–10.5)

## 2020-06-19 PROCEDURE — 99232 SBSQ HOSP IP/OBS MODERATE 35: CPT

## 2020-06-19 RX ORDER — CEFTRIAXONE 500 MG/1
2000 INJECTION, POWDER, FOR SOLUTION INTRAMUSCULAR; INTRAVENOUS EVERY 24 HOURS
Refills: 0 | Status: DISCONTINUED | OUTPATIENT
Start: 2020-06-19 | End: 2020-06-21

## 2020-06-19 RX ORDER — IRON SUCROSE 20 MG/ML
100 INJECTION, SOLUTION INTRAVENOUS EVERY 24 HOURS
Refills: 0 | Status: COMPLETED | OUTPATIENT
Start: 2020-06-19 | End: 2020-06-20

## 2020-06-19 RX ADMIN — IRON SUCROSE 100 MILLIGRAM(S): 20 INJECTION, SOLUTION INTRAVENOUS at 16:44

## 2020-06-19 RX ADMIN — Medication 1 TABLET(S): at 11:52

## 2020-06-19 RX ADMIN — Medication 1000 MILLIGRAM(S): at 11:52

## 2020-06-19 RX ADMIN — Medication 4: at 17:12

## 2020-06-19 RX ADMIN — Medication 3: at 08:34

## 2020-06-19 RX ADMIN — Medication 1 GRAM(S): at 11:53

## 2020-06-19 RX ADMIN — Medication 100 MILLIGRAM(S): at 05:37

## 2020-06-19 RX ADMIN — PANTOPRAZOLE SODIUM 40 MILLIGRAM(S): 20 TABLET, DELAYED RELEASE ORAL at 05:37

## 2020-06-19 RX ADMIN — Medication 25 MILLIGRAM(S): at 21:21

## 2020-06-19 RX ADMIN — Medication 100 MILLIGRAM(S): at 21:21

## 2020-06-19 RX ADMIN — Medication 100 MILLIGRAM(S): at 12:07

## 2020-06-19 RX ADMIN — Medication 1 GRAM(S): at 21:22

## 2020-06-19 RX ADMIN — TAMSULOSIN HYDROCHLORIDE 0.8 MILLIGRAM(S): 0.4 CAPSULE ORAL at 00:18

## 2020-06-19 RX ADMIN — ISOSORBIDE MONONITRATE 30 MILLIGRAM(S): 60 TABLET, EXTENDED RELEASE ORAL at 05:37

## 2020-06-19 RX ADMIN — TAMSULOSIN HYDROCHLORIDE 0.8 MILLIGRAM(S): 0.4 CAPSULE ORAL at 21:22

## 2020-06-19 RX ADMIN — Medication 1 GRAM(S): at 00:18

## 2020-06-19 RX ADMIN — Medication 325 MILLIGRAM(S): at 05:38

## 2020-06-19 RX ADMIN — MORPHINE SULFATE 2 MILLIGRAM(S): 50 CAPSULE, EXTENDED RELEASE ORAL at 23:16

## 2020-06-19 RX ADMIN — OXYCODONE HYDROCHLORIDE 5 MILLIGRAM(S): 5 TABLET ORAL at 21:27

## 2020-06-19 RX ADMIN — Medication 10 MILLIEQUIVALENT(S): at 11:53

## 2020-06-19 RX ADMIN — CEFTRIAXONE 100 MILLIGRAM(S): 500 INJECTION, POWDER, FOR SOLUTION INTRAMUSCULAR; INTRAVENOUS at 11:51

## 2020-06-19 RX ADMIN — MORPHINE SULFATE 2 MILLIGRAM(S): 50 CAPSULE, EXTENDED RELEASE ORAL at 00:14

## 2020-06-19 RX ADMIN — Medication 3: at 22:09

## 2020-06-19 RX ADMIN — Medication 4: at 11:59

## 2020-06-19 RX ADMIN — Medication 50 MILLIGRAM(S): at 05:37

## 2020-06-19 RX ADMIN — Medication 1 GRAM(S): at 05:37

## 2020-06-19 RX ADMIN — LOSARTAN POTASSIUM 25 MILLIGRAM(S): 100 TABLET, FILM COATED ORAL at 05:37

## 2020-06-19 RX ADMIN — ATORVASTATIN CALCIUM 40 MILLIGRAM(S): 80 TABLET, FILM COATED ORAL at 21:21

## 2020-06-19 RX ADMIN — Medication 1 GRAM(S): at 16:44

## 2020-06-19 RX ADMIN — FAMOTIDINE 20 MILLIGRAM(S): 10 INJECTION INTRAVENOUS at 05:37

## 2020-06-19 RX ADMIN — Medication 325 MILLIGRAM(S): at 16:44

## 2020-06-19 RX ADMIN — Medication 25 MILLIGRAM(S): at 05:38

## 2020-06-19 RX ADMIN — Medication 25 MILLIGRAM(S): at 12:06

## 2020-06-19 NOTE — PROGRESS NOTE ADULT - ASSESSMENT
IMPRESSION:  49/m with multiple co morbid medical history, including hx of cad, s/p mi, h/o pe, h/o a.fibb, h/o abd surgery for perforated gastric ulcer in past who had left foot TMA surgery around 5/20/2020, was admitted around 6/16 for pain, bleeding and redness at wound site, seen by podiatry and underwent revision left foot TMA 6/16/2020 (estimated 300 ml blood loss per surgery), had 3 units prbc , 1 unit platelets and 1 unit ffp this admission, hb was around 10.5 this admission, getting treatment for possible OM as per id and is on abx as per id    RECOMMENDATION:  Anemia--seems multifactorial, likely from blood loss/post surgery, blood loss from wound site with revision tma 6/16  iron profile--low iron, transferrin saturation, will place on iv iron as looking for a rapid response  follow anemia work up to exclude treatable etiology  no evidence of hemolysis, negative fobt  monitor h/h--transfuse prbc as needed for symptomatic anemia  gi/dvtp as per podiatry/surgery  case d/w pt at bedside and is in agreement

## 2020-06-19 NOTE — PROGRESS NOTE ADULT - PROBLEM SELECTOR PLAN 1
sp revisions,  will change to daily ceftriaxone with prior MSSA and now with enterococcus with further recs based on path which I anticipate being back on 6/22

## 2020-06-19 NOTE — PROGRESS NOTE ADULT - ASSESSMENT
49year old Male seen at [facility] ED for left foot TMA wound sp amputation with clean margins, readmitted with wound painful and foul smelling.     Revision of transmetatarsal amputation of left foot 16-Jun-2020 21:37:39  Toan Tavarez. with estimated 300ml blood loss

## 2020-06-19 NOTE — PROGRESS NOTE ADULT - SUBJECTIVE AND OBJECTIVE BOX
Patient is a 49y old  Male who presents with a chief complaint of left foot infection/bleeding (19 Jun 2020 11:01)       Pt is seen and examined  pt is awake and lying in bed/out of bed to chair  pt seems comfortable and denies any complaints at this time    HPI:  49year old Male seen at hospitals ED for left foot TMA wound. Pt was seen in wound care by Dr. Hale today for his left foot TMA (DOS 5.20.2020) Pt relates he went to [facility] last week for the wound and states that the wound was packed and redressed. Pt presented today and wound was painful and foul smelling.     Denies any fever, chills, nausea, vomiting, chest pain, shortness of breath, or calf pain at this time. (16 Jun 2020 12:13)         ROS:  Negative except for:    MEDICATIONS  (STANDING):  ascorbic acid 1000 milliGRAM(s) Oral daily  atorvastatin 40 milliGRAM(s) Oral at bedtime  bethanechol 25 milliGRAM(s) Oral three times a day  cefTRIAXone   IVPB 2000 milliGRAM(s) IV Intermittent every 24 hours  dextrose 5%. 1000 milliLiter(s) (50 mL/Hr) IV Continuous <Continuous>  dextrose 50% Injectable 12.5 Gram(s) IV Push once  dextrose 50% Injectable 25 Gram(s) IV Push once  dextrose 50% Injectable 25 Gram(s) IV Push once  famotidine    Tablet 20 milliGRAM(s) Oral every 24 hours  ferrous    sulfate 325 milliGRAM(s) Oral two times a day  insulin lispro (HumaLOG) corrective regimen sliding scale   SubCutaneous three times a day before meals  insulin lispro (HumaLOG) corrective regimen sliding scale   SubCutaneous at bedtime  isosorbide   mononitrate ER Tablet (IMDUR) 30 milliGRAM(s) Oral every 24 hours  losartan 25 milliGRAM(s) Oral daily  methimazole 10 milliGRAM(s) Oral daily  metoprolol tartrate 50 milliGRAM(s) Oral every 12 hours  multivitamin 1 Tablet(s) Oral daily  pantoprazole    Tablet 40 milliGRAM(s) Oral before breakfast  phenazopyridine 100 milliGRAM(s) Oral every 8 hours  potassium chloride    Tablet ER 10 milliEquivalent(s) Oral daily  senna 2 Tablet(s) Oral at bedtime  sucralfate 1 Gram(s) Oral four times a day  tamsulosin 0.8 milliGRAM(s) Oral at bedtime    MEDICATIONS  (PRN):  acetaminophen   Tablet .. 650 milliGRAM(s) Oral every 6 hours PRN Temp greater or equal to 38C (100.4F), Mild Pain (1 - 3)  bisacodyl Suppository 10 milliGRAM(s) Rectal daily PRN Constipation  dextrose 40% Gel 15 Gram(s) Oral once PRN Blood Glucose LESS THAN 70 milliGRAM(s)/deciliter  glucagon  Injectable 1 milliGRAM(s) IntraMuscular once PRN Glucose LESS THAN 70 milligrams/deciliter  morphine  - Injectable 2 milliGRAM(s) IV Push every 6 hours PRN breakthrough pain  ondansetron Injectable 4 milliGRAM(s) IV Push once PRN Nausea and/or Vomiting  oxyCODONE    IR 5 milliGRAM(s) Oral every 6 hours PRN Severe Pain (7 - 10)  traMADol 25 milliGRAM(s) Oral every 6 hours PRN Moderate Pain (4 - 6)      Allergies    fish (Hives)  No Known Drug Allergies    Intolerances        Vital Signs Last 24 Hrs  T(C): 36.5 (19 Jun 2020 04:00), Max: 36.6 (18 Jun 2020 20:26)  T(F): 97.7 (19 Jun 2020 04:00), Max: 97.8 (18 Jun 2020 20:26)  HR: 60 (19 Jun 2020 05:33) (57 - 60)  BP: 116/65 (19 Jun 2020 05:33) (107/69 - 125/84)  BP(mean): --  RR: 17 (19 Jun 2020 04:00) (17 - 18)  SpO2: 96% (19 Jun 2020 04:00) (93% - 96%)    PHYSICAL EXAM  General: adult in NAD  HEENT: clear oropharynx, anicteric sclera, pink conjunctiva  Neck: supple  CV: normal S1/S2 with no murmur rubs or gallops  Lungs: positive air movement b/l ant lungs,clear to auscultation, no wheezes, no rales  Abdomen: soft non-tender non-distended, no hepatosplenomegaly  Ext: no clubbing cyanosis or edema  Skin: no rashes and no petechiae  Neuro: alert and oriented X 4, no focal deficits  LABS:                          8.5    8.88  )-----------( 367      ( 19 Jun 2020 08:46 )             27.1         Mean Cell Volume : 90.6 fl  Mean Cell Hemoglobin : 28.4 pg  Mean Cell Hemoglobin Concentration : 31.4 gm/dL  Auto Neutrophil # : x  Auto Lymphocyte # : x  Auto Monocyte # : x  Auto Eosinophil # : x  Auto Basophil # : x  Auto Neutrophil % : x  Auto Lymphocyte % : x  Auto Monocyte % : x  Auto Eosinophil % : x  Auto Basophil % : x    Serial CBC's  06-19 @ 08:46  Hct-27.1 / Hgb-8.5 / Plat-367 / RBC-2.99 / WBC-8.88          Serial CBC's  06-18 @ 16:16  Hct--- / Hgb--- / Plat--- / RBC-2.82 / WBC---          Serial CBC's  06-18 @ 09:20  Hct-26.6 / Hgb-8.3 / Plat-321 / RBC-2.96 / WBC-8.80          Serial CBC's  06-17 @ 13:58  Hct-26.7 / Hgb-8.5 / Plat--- / RBC--- / WBC---          Serial CBC's  06-17 @ 07:02  Hct-23.8 / Hgb-7.5 / Plat-327 / RBC-2.69 / WBC-10.68            06-18    139  |  106  |  10  ----------------------------<  261<H>  3.9   |  25  |  0.81    Ca    8.0<L>      18 Jun 2020 09:20  Phos  2.3     06-18  Mg     1.8     06-18    TPro  5.9<L>  /  Alb  2.1<L>  /  TBili  0.5  /  DBili  x   /  AST  10<L>  /  ALT  9<L>  /  AlkPhos  94  06-18      PT/INR - ( 18 Jun 2020 09:20 )   PT: 13.8 sec;   INR: 1.22 ratio             Ferritin, Serum: 86 ng/mL (06-19-20 @ 01:18)  Vitamin B12, Serum: 342 pg/mL (06-19-20 @ 01:18)  Folate, Serum: 13.8 ng/mL (06-19-20 @ 01:18)  Iron - Total Binding Capacity.: 201 ug/dL (06-19-20 @ 01:13)  Reticulocyte Percent: 3.9 % (06-18-20 @ 16:16)                BLOOD SMEAR INTERPRETATION:       RADIOLOGY & ADDITIONAL STUDIES: Patient is a 49y old  Male who presents with a chief complaint of left foot infection/bleeding (19 Jun 2020 11:01)       Pt is seen and examined  pt is awake and lying in bed  pt seems comfortable and denies any complaints at this time    HPI:  49year old Male seen at [facility] ED for left foot TMA wound. Pt was seen in wound care by Dr. Hale today for his left foot TMA (DOS 5.20.2020) Pt relates he went to [facility] last week for the wound and states that the wound was packed and redressed. Pt presented today and wound was painful and foul smelling.     Denies any fever, chills, nausea, vomiting, chest pain, shortness of breath, or calf pain at this time. (16 Jun 2020 12:13)         ROS:  as per hpi    MEDICATIONS  (STANDING):  ascorbic acid 1000 milliGRAM(s) Oral daily  atorvastatin 40 milliGRAM(s) Oral at bedtime  bethanechol 25 milliGRAM(s) Oral three times a day  cefTRIAXone   IVPB 2000 milliGRAM(s) IV Intermittent every 24 hours  dextrose 5%. 1000 milliLiter(s) (50 mL/Hr) IV Continuous <Continuous>  dextrose 50% Injectable 12.5 Gram(s) IV Push once  dextrose 50% Injectable 25 Gram(s) IV Push once  dextrose 50% Injectable 25 Gram(s) IV Push once  famotidine    Tablet 20 milliGRAM(s) Oral every 24 hours  ferrous    sulfate 325 milliGRAM(s) Oral two times a day  insulin lispro (HumaLOG) corrective regimen sliding scale   SubCutaneous three times a day before meals  insulin lispro (HumaLOG) corrective regimen sliding scale   SubCutaneous at bedtime  isosorbide   mononitrate ER Tablet (IMDUR) 30 milliGRAM(s) Oral every 24 hours  losartan 25 milliGRAM(s) Oral daily  methimazole 10 milliGRAM(s) Oral daily  metoprolol tartrate 50 milliGRAM(s) Oral every 12 hours  multivitamin 1 Tablet(s) Oral daily  pantoprazole    Tablet 40 milliGRAM(s) Oral before breakfast  phenazopyridine 100 milliGRAM(s) Oral every 8 hours  potassium chloride    Tablet ER 10 milliEquivalent(s) Oral daily  senna 2 Tablet(s) Oral at bedtime  sucralfate 1 Gram(s) Oral four times a day  tamsulosin 0.8 milliGRAM(s) Oral at bedtime    MEDICATIONS  (PRN):  acetaminophen   Tablet .. 650 milliGRAM(s) Oral every 6 hours PRN Temp greater or equal to 38C (100.4F), Mild Pain (1 - 3)  bisacodyl Suppository 10 milliGRAM(s) Rectal daily PRN Constipation  dextrose 40% Gel 15 Gram(s) Oral once PRN Blood Glucose LESS THAN 70 milliGRAM(s)/deciliter  glucagon  Injectable 1 milliGRAM(s) IntraMuscular once PRN Glucose LESS THAN 70 milligrams/deciliter  morphine  - Injectable 2 milliGRAM(s) IV Push every 6 hours PRN breakthrough pain  ondansetron Injectable 4 milliGRAM(s) IV Push once PRN Nausea and/or Vomiting  oxyCODONE    IR 5 milliGRAM(s) Oral every 6 hours PRN Severe Pain (7 - 10)  traMADol 25 milliGRAM(s) Oral every 6 hours PRN Moderate Pain (4 - 6)      Allergies    fish (Hives)  No Known Drug Allergies    Intolerances        Vital Signs Last 24 Hrs  T(C): 36.5 (19 Jun 2020 04:00), Max: 36.6 (18 Jun 2020 20:26)  T(F): 97.7 (19 Jun 2020 04:00), Max: 97.8 (18 Jun 2020 20:26)  HR: 60 (19 Jun 2020 05:33) (57 - 60)  BP: 116/65 (19 Jun 2020 05:33) (107/69 - 125/84)  BP(mean): --  RR: 17 (19 Jun 2020 04:00) (17 - 18)  SpO2: 96% (19 Jun 2020 04:00) (93% - 96%)    PHYSICAL EXAM  General: adult in NAD  HEENT: clear oropharynx, anicteric sclera, pink conjunctiva  Neck: supple  CV: normal S1/S2 with no murmur rubs or gallops  Lungs: positive air movement b/l ant lungs,clear to auscultation, no wheezes, no rales  Abdomen: soft non-tender non-distended, no hepatosplenomegaly  Ext: no clubbing cyanosis or edema  Skin: no rashes and no petechiae  Neuro: alert and oriented X 4, no focal deficits  LABS:                          8.5    8.88  )-----------( 367      ( 19 Jun 2020 08:46 )             27.1         Mean Cell Volume : 90.6 fl  Mean Cell Hemoglobin : 28.4 pg  Mean Cell Hemoglobin Concentration : 31.4 gm/dL  Auto Neutrophil # : x  Auto Lymphocyte # : x  Auto Monocyte # : x  Auto Eosinophil # : x  Auto Basophil # : x  Auto Neutrophil % : x  Auto Lymphocyte % : x  Auto Monocyte % : x  Auto Eosinophil % : x  Auto Basophil % : x    Serial CBC's  06-19 @ 08:46  Hct-27.1 / Hgb-8.5 / Plat-367 / RBC-2.99 / WBC-8.88          Serial CBC's  06-18 @ 16:16  Hct--- / Hgb--- / Plat--- / RBC-2.82 / WBC---          Serial CBC's  06-18 @ 09:20  Hct-26.6 / Hgb-8.3 / Plat-321 / RBC-2.96 / WBC-8.80          Serial CBC's  06-17 @ 13:58  Hct-26.7 / Hgb-8.5 / Plat--- / RBC--- / WBC---          Serial CBC's  06-17 @ 07:02  Hct-23.8 / Hgb-7.5 / Plat-327 / RBC-2.69 / WBC-10.68            06-18    139  |  106  |  10  ----------------------------<  261<H>  3.9   |  25  |  0.81    Ca    8.0<L>      18 Jun 2020 09:20  Phos  2.3     06-18  Mg     1.8     06-18    TPro  5.9<L>  /  Alb  2.1<L>  /  TBili  0.5  /  DBili  x   /  AST  10<L>  /  ALT  9<L>  /  AlkPhos  94  06-18      PT/INR - ( 18 Jun 2020 09:20 )   PT: 13.8 sec;   INR: 1.22 ratio             Ferritin, Serum: 86 ng/mL (06-19-20 @ 01:18)  Vitamin B12, Serum: 342 pg/mL (06-19-20 @ 01:18)  Folate, Serum: 13.8 ng/mL (06-19-20 @ 01:18)  Iron - Total Binding Capacity.: 201 ug/dL (06-19-20 @ 01:13)  Reticulocyte Percent: 3.9 % (06-18-20 @ 16:16)  Iron with Total Binding Capacity (06.19.20 @ 01:13)    Iron - Total Binding Capacity.: 201 ug/dL    % Saturation, Iron: 7 %    Iron Total, Serum: 13 ug/dL    Unsaturated Iron Binding Capacity: 188 ug/dL    Haptoglobin, Serum (06.19.20 @ 01:13)    Haptoglobin, Serum: 271 mg/dL    Occult Blood, Feces Multiple Specimen (06.18.20 @ 18:28)    Occult Blood, Feces Multiple Specimen: Negative

## 2020-06-19 NOTE — PROGRESS NOTE ADULT - SUBJECTIVE AND OBJECTIVE BOX
infectious diseases progress note:    SARAH MORRISON is a 49y y. o. Male patient    No concerning overnight events    Allergies    fish (Hives)  No Known Drug Allergies    Intolerances        ANTIBIOTICS/RELEVANT:  antimicrobials  ceFAZolin   IVPB 2000 milliGRAM(s) IV Intermittent every 8 hours    immunologic:    OTHER:  acetaminophen   Tablet .. 650 milliGRAM(s) Oral every 6 hours PRN  ascorbic acid 1000 milliGRAM(s) Oral daily  atorvastatin 40 milliGRAM(s) Oral at bedtime  bethanechol 25 milliGRAM(s) Oral three times a day  bisacodyl Suppository 10 milliGRAM(s) Rectal daily PRN  dextrose 40% Gel 15 Gram(s) Oral once PRN  dextrose 5%. 1000 milliLiter(s) IV Continuous <Continuous>  dextrose 50% Injectable 12.5 Gram(s) IV Push once  dextrose 50% Injectable 25 Gram(s) IV Push once  dextrose 50% Injectable 25 Gram(s) IV Push once  famotidine    Tablet 20 milliGRAM(s) Oral every 24 hours  ferrous    sulfate 325 milliGRAM(s) Oral two times a day  glucagon  Injectable 1 milliGRAM(s) IntraMuscular once PRN  insulin lispro (HumaLOG) corrective regimen sliding scale   SubCutaneous three times a day before meals  insulin lispro (HumaLOG) corrective regimen sliding scale   SubCutaneous at bedtime  isosorbide   mononitrate ER Tablet (IMDUR) 30 milliGRAM(s) Oral every 24 hours  losartan 25 milliGRAM(s) Oral daily  methimazole 10 milliGRAM(s) Oral daily  metoprolol tartrate 50 milliGRAM(s) Oral every 12 hours  morphine  - Injectable 2 milliGRAM(s) IV Push every 6 hours PRN  multivitamin 1 Tablet(s) Oral daily  ondansetron Injectable 4 milliGRAM(s) IV Push once PRN  oxyCODONE    IR 5 milliGRAM(s) Oral every 6 hours PRN  pantoprazole    Tablet 40 milliGRAM(s) Oral before breakfast  phenazopyridine 100 milliGRAM(s) Oral every 8 hours  potassium chloride    Tablet ER 10 milliEquivalent(s) Oral daily  senna 2 Tablet(s) Oral at bedtime  sucralfate 1 Gram(s) Oral four times a day  tamsulosin 0.8 milliGRAM(s) Oral at bedtime  traMADol 25 milliGRAM(s) Oral every 6 hours PRN      Objective:  Vital Signs Last 24 Hrs  T(C): 36.5 (19 Jun 2020 04:00), Max: 36.9 (18 Jun 2020 12:34)  T(F): 97.7 (19 Jun 2020 04:00), Max: 98.5 (18 Jun 2020 12:34)  HR: 60 (19 Jun 2020 05:33) (57 - 60)  BP: 116/65 (19 Jun 2020 05:33) (107/69 - 125/84)  BP(mean): --  RR: 17 (19 Jun 2020 04:00) (16 - 18)  SpO2: 96% (19 Jun 2020 04:00) (92% - 96%)    T(C): 36.5 (06-19-20 @ 04:00), Max: 37.6 (06-17-20 @ 23:05)  T(C): 36.5 (06-19-20 @ 04:00), Max: 38.6 (06-16-20 @ 21:22)  T(C): 36.5 (06-19-20 @ 04:00), Max: 38.6 (06-16-20 @ 21:22)    PHYSICAL EXAM:  HEENT: NC atraumatic  Neck: supple  Respiratory: no accessory muscle use, breathing comfortably  Cardiovascular: distant  Gastrointestinal: normal appearing, nondistended  Extremities: no clubbing, no cyanosis, foot wrapped      LABS:                          8.5    8.88  )-----------( 367      ( 19 Jun 2020 08:46 )             27.1       8.88 06-19 @ 08:46  8.80 06-18 @ 09:20  10.68 06-17 @ 07:02  16.09 06-16 @ 18:26  17.61 06-16 @ 12:23  9.14 06-16 @ 10:53      06-18    139  |  106  |  10  ----------------------------<  261<H>  3.9   |  25  |  0.81    Ca    8.0<L>      18 Jun 2020 09:20  Phos  2.3     06-18  Mg     1.8     06-18    TPro  5.9<L>  /  Alb  2.1<L>  /  TBili  0.5  /  DBili  x   /  AST  10<L>  /  ALT  9<L>  /  AlkPhos  94  06-18      Creatinine, Serum: 0.81 mg/dL (06-18-20 @ 09:20)  Creatinine, Serum: 0.79 mg/dL (06-17-20 @ 07:02)  Creatinine, Serum: 0.92 mg/dL (06-16-20 @ 10:53)      PT/INR - ( 18 Jun 2020 09:20 )   PT: 13.8 sec;   INR: 1.22 ratio                   INFLAMMATORY MARKERS  Auto Neutrophil #: 6.62 K/uL (06-18-20 @ 09:20)  Auto Lymphocyte #: 1.06 K/uL (06-18-20 @ 09:20)  Auto Neutrophil #: 8.15 K/uL (06-17-20 @ 07:02)  Auto Lymphocyte #: 1.36 K/uL (06-17-20 @ 07:02)  Auto Lymphocyte #: 1.11 K/uL (06-16-20 @ 10:53)  Auto Neutrophil #: 6.92 K/uL (06-16-20 @ 10:53)    Lactate, Blood: 0.9 mmol/L (06-17-20 @ 07:02)  Lactate, Blood: 3.7 mmol/L (06-16-20 @ 10:53)    Auto Eosinophil #: 0.21 K/uL (06-18-20 @ 09:20)  Auto Eosinophil #: 0.14 K/uL (06-17-20 @ 07:02)  Auto Eosinophil #: 0.20 K/uL (06-16-20 @ 10:53)      Sedimentation Rate, Erythrocyte: 42 mm/hr (06-16-20 @ 10:53)              Ferritin, Serum: 86 ng/mL (06-19-20 @ 01:18)        INR: 1.22 ratio (06-18-20 @ 09:20)  INR: 1.38 ratio (06-17-20 @ 07:02)  INR: 1.40 ratio (06-16-20 @ 18:26)  Activated Partial Thromboplastin Time: 34.9 sec (06-16-20 @ 18:26)  Activated Partial Thromboplastin Time: 35.2 sec (06-16-20 @ 10:53)  INR: 1.55 ratio (06-16-20 @ 10:53)          MICROBIOLOGY:    Culture - Tissue with Gram Stain (06.17.20 @ 01:59)    Gram Stain:   Rare polymorphonuclear leukocytes seen per low power field  No organisms seen per oil power field    Specimen Source: .Tissue Other, left metatarsal bone    Culture Results:   Rare Staphylococcus aureus  Rare Enterococcus avium  Rare Enterococcus faecalis    Organism Identification: Enterococcus avium  Enterococcus faecalis    Organism: Enterococcus avium    Organism: Enterococcus faecalis    Method Type: BAYLEE    Method Type: BAYLEE        RADIOLOGY & ADDITIONAL STUDIES:

## 2020-06-19 NOTE — PROGRESS NOTE ADULT - ASSESSMENT
Assessment: 49 y.o M s/p TMA Revision (DOS: 6/16/20)    Plan:  Patient examined and evaluated with Dr. Hale  Surgical site dressed with adaptic and dry, sterile dressing.  Patient is to be non weight bearing to the left lower extremity.  Surgical pathology and cultures pending at this time.  Antibiotics as per ID recommendations.  Pain Management per medical team Assessment: 49 y.o M s/p TMA Revision (DOS: 6/16/20)    Plan:  Patient examined and evaluated with Dr. Hale  Surgical site dressed with adaptic and dry, sterile dressing.  Patient is to be non weight bearing to the left lower extremity.  Surgical pathology and cultures pending at this time.  Antibiotics as per ID recommendations.  Pain Management per medical team    Wound Care Instructions  1. Surgical dressing is to remain clean, dry and intact until follow up appt  2. Pt is to remain NWB to the left foot  3. Patient is to [facility] up [facility] with Dr. Hale or Dr. Todd within 5 days upon discharge.

## 2020-06-19 NOTE — PROGRESS NOTE ADULT - SUBJECTIVE AND OBJECTIVE BOX
[facility] -- Bolivar Carbajal Grossman, [place], Reynaldo Sweeney, Gera France: Office # [phone]    Follow Up:  A fib     Subjective/Observations: Patient seen and examined. Patient awake and alert, resting comfortably in bed. No complaints of chest pain, SOB, LE edema, cough. No signs of orthopnea or PND. No bleeding from foot at present     REVIEW OF SYSTEMS: All review of systems is negative for eye, ENT, GI, , allergic, dermatologic, musculoskeletal and neurologic except as described above    PAST MEDICAL & SURGICAL HISTORY:  Cerebrovascular accident  CAD S/P percutaneous coronary angioplasty  Osteomyelitis: s/p debridement  History of non-ST elevation myocardial infarction (NSTEMI)  Pulmonary embolism  Perforated gastric ulcer: s/p emergent ex-lap omentopexy and plication 6/2019  BPH (benign prostatic hyperplasia)  Hypertension  Hypertension  Afib  Diabetes mellitus with no complication  Diabetes  Traumatic amputation of left foot, initial encounter  Perforated gastric ulcer  H/O abdominal surgery    MEDICATIONS  (STANDING):  ascorbic acid 1000 milliGRAM(s) Oral daily  atorvastatin 40 milliGRAM(s) Oral at bedtime  bethanechol 25 milliGRAM(s) Oral three times a day  cefTRIAXone   IVPB 2000 milliGRAM(s) IV Intermittent every 24 hours  dextrose 5%. 1000 milliLiter(s) (50 mL/Hr) IV Continuous <Continuous>  dextrose 50% Injectable 12.5 Gram(s) IV Push once  dextrose 50% Injectable 25 Gram(s) IV Push once  dextrose 50% Injectable 25 Gram(s) IV Push once  famotidine    Tablet 20 milliGRAM(s) Oral every 24 hours  ferrous    sulfate 325 milliGRAM(s) Oral two times a day  insulin lispro (HumaLOG) corrective regimen sliding scale   SubCutaneous three times a day before meals  insulin lispro (HumaLOG) corrective regimen sliding scale   SubCutaneous at bedtime  isosorbide   mononitrate ER Tablet (IMDUR) 30 milliGRAM(s) Oral every 24 hours  losartan 25 milliGRAM(s) Oral daily  methimazole 10 milliGRAM(s) Oral daily  metoprolol tartrate 50 milliGRAM(s) Oral every 12 hours  multivitamin 1 Tablet(s) Oral daily  pantoprazole    Tablet 40 milliGRAM(s) Oral before breakfast  phenazopyridine 100 milliGRAM(s) Oral every 8 hours  potassium chloride    Tablet ER 10 milliEquivalent(s) Oral daily  senna 2 Tablet(s) Oral at bedtime  sucralfate 1 Gram(s) Oral four times a day  tamsulosin 0.8 milliGRAM(s) Oral at bedtime    MEDICATIONS  (PRN):  acetaminophen   Tablet .. 650 milliGRAM(s) Oral every 6 hours PRN Temp greater or equal to 38C (100.4F), Mild Pain (1 - 3)  bisacodyl Suppository 10 milliGRAM(s) Rectal daily PRN Constipation  dextrose 40% Gel 15 Gram(s) Oral once PRN Blood Glucose LESS THAN 70 milliGRAM(s)/deciliter  glucagon  Injectable 1 milliGRAM(s) IntraMuscular once PRN Glucose LESS THAN 70 milligrams/deciliter  morphine  - Injectable 2 milliGRAM(s) IV Push every 6 hours PRN breakthrough pain  ondansetron Injectable 4 milliGRAM(s) IV Push once PRN Nausea and/or Vomiting  oxyCODONE    IR 5 milliGRAM(s) Oral every 6 hours PRN Severe Pain (7 - 10)  traMADol 25 milliGRAM(s) Oral every 6 hours PRN Moderate Pain (4 - 6)    Allergies  fish (Hives)  No Known Drug Allergies    Vital Signs Last 24 Hrs  T(C): 36.5 (19 Jun 2020 04:00), Max: 36.9 (18 Jun 2020 12:34)  T(F): 97.7 (19 Jun 2020 04:00), Max: 98.5 (18 Jun 2020 12:34)  HR: 60 (19 Jun 2020 05:33) (57 - 60)  BP: 116/65 (19 Jun 2020 05:33) (107/69 - 125/84)  BP(mean): --  RR: 17 (19 Jun 2020 04:00) (16 - 18)  SpO2: 96% (19 Jun 2020 04:00) (92% - 96%)    I&O's Summary    18 Jun 2020 07:01  -  19 Jun 2020 07:00  --------------------------------------------------------  IN: 400 mL / OUT: 4825 mL / NET: -4425 mL    TELE: Not on telemetry   PHYSICAL EXAM:  Appearance: NAD, no distress, alert, Well developed   HEENT: Moist Mucous Membranes, Anicteric  Cardiovascular: Regular rate and rhythm, Normal S1 S2, No JVD, No murmurs, No rubs, gallops or clicks  Respiratory: Non-labored, Clear to auscultation, No rales, No rhonchi, No wheezing.   Gastrointestinal:  Soft, Non-tender, + BS  Neurologic: Non-focal  Skin: Warm and dry, No visible rashes or ulcers, No ecchymosis, No cyanosis  Musculoskeletal: + Lt foot DSD No clubbing, No cyanosis, No joint swelling/tenderness  Psychiatry: Mood & affect appropriate  Lymph: No peripheral edema.     LABS: All Labs Reviewed:                        8.5    8.88  )-----------( 367      ( 19 Jun 2020 08:46 )             27.1                         8.3    8.80  )-----------( 321      ( 18 Jun 2020 09:20 )             26.6                         8.5    x     )-----------( x        ( 17 Jun 2020 13:58 )             26.7     18 Jun 2020 09:20    139    |  106    |  10     ----------------------------<  261    3.9     |  25     |  0.81   17 Jun 2020 07:02    140    |  107    |  12     ----------------------------<  128    3.7     |  25     |  0.79     Ca    8.0        18 Jun 2020 09:20  Ca    7.9        17 Jun 2020 07:02  Phos  2.3       18 Jun 2020 09:20  Phos  2.9       17 Jun 2020 07:02  Mg     1.8       18 Jun 2020 09:20  Mg     1.8       17 Jun 2020 07:02    TPro  5.9    /  Alb  2.1    /  TBili  0.5    /  DBili  x      /  AST  10     /  ALT  9      /  AlkPhos  94     18 Jun 2020 09:20  PT/INR - ( 18 Jun 2020 09:20 )   PT: 13.8 sec;   INR: 1.22 ratio    Triiodothyronine, Total (T3 Total): 68 ng/dL (06-18-20 @ 11:01)  Lactate, Blood: 0.9 mmol/L (06-17-20 @ 07:02)    12 Lead ECG:   Ventricular Rate 61 BPM  Atrial Rate 61 BPM  P-R Interval 146 ms  QRS Duration 82 ms  Q-T Interval 416 ms  QTC Calculation(Bezet) 418 ms  P Axis 42 degrees  R Axis 19 degrees  T Axis 54 degrees  Diagnosis Line Normal sinus rhythm  Normal ECG  When compared with ECG of 16-JUN-2020 11:37,  No significant change was found  Confirmed by DAT CALLAHAN ([id number]) on 6/17/2020 4:51:28 PM (06-17-20 @ 08:18)    < from: TTE Echo Doppler w/o Cont (12.24.19 @ 13:55) >  Dimensions:    LA 4.2       Normal Values: 2.0 - 4.0 cm    Ao 4.0        Normal Values: 2.0 - 3.8 cm  SEPTUM 1.6       Normal Values: 0.6 - 1.2 cm  PWT 1.6       Normal Values: 0.6 - 1.1 cm  LVIDd 5.8         Normal Values: 3.0 - 5.6 cm  LVIDs 3.2         Normal Values: 1.8 - 4.0 cm    Derived Variables:  LVMI g/m2  RWT  Fractional Short  Ejection Fraction    Doppler Peak v. AoV= (m/sec)    OBSERVATIONS:    Mitral Valve: normal, mild MR.  Aortic Valve/Aorta: normal trileaflet aortic valve.  Tricuspid Valve: normal with trace TR.  Pulmonic Valve: normal  Left Atrium: Enlarged  Right Atrium: normal  Left Ventricle: Severe concentric LVH with normal systolic function, estimated LVEF of 65%.  Right Ventricle: normal size and systolic function.  Pericardium/Pleura: no significant pleural effusion, no significant pericardial effusion.  Pulmonary/RV Pressure: Inadequate TR jet to estimate PA systolic pressure  LV Diastolic Function: Grade 2diastolic dysfunction.    Severe concentric LVH, and mild LV enlargement, with normal systolic function, estimated LVEF of 65%. Normal right ventricular size and systolic function. Grade 2diastolic dysfunction. Left atrial enlargement The aortic root is normal in size. The mitral valve is structurally normal, mild MR. The aortic valve is trileaflet without stenosis or insufficiency. Trace physiologic TR. Inadequate TR jet to estimate PA systolic pressure No significant pericardial effusion.        < end of copied text >    < from: Cardiac Cath Lab - Adult (08.02.19 @ 08:55) >  VENTRICLES: No left ventriculogram was performed.  CORONARY VESSELS: The coronary circulation is right dominant.  RCA:   --  Proximal RCA: There was a 95 % stenosis.  COMPLICATIONS: There were no complications.  INTERVENTIONAL IMPRESSIONS: Successful stenting of the RCA with BMS  INTERVENTIONAL RECOMMENDATIONS: Aspirin for 1 week. Plavix for 3 weeks. Eliquis for PE (duration TBD) - limit triple therapy for 1 week then continue with plavix eliquis for 3 weeks. This modified DAPT regimen is due to his recent perforated ulcer and surgery.    < end of copied text >    < from: Cardiac Cath Lab - Adult (08.01.19 @ 14:11) >  (Isoptin, Calan, Covera), 2.5 mg, IA. Heparin, 3000 units, IA.  VENTRICLES: No left ventriculogram was performed.  CORONARY VESSELS: The coronary circulation is right dominant.  LM:   --  LM: Normal.  LAD:   --  Mid LAD: There was a tubular 30 % stenosis.  --  D1: There was a tubular 40 % stenosis.  CX:   --  Circumflex: Angiography showed minor luminal irregularities with no flow limiting lesions.  --  OM1: Angiography showed minor luminal irregularities with no flow limiting lesions.  --  OM2: Angiography showed minor luminal irregularities with no flow limiting lesions.  RCA:   --  Proximal RCA: There was a 95 % stenosis.  --  RPLS: There was a 100 % stenosis.  COMPLICATIONS: There were no complications.  DIAGNOSTIC IMPRESSIONS: Severe RCA disease (95% prox disease, occluded  RPL). Mild LCx and Mild LAD disease.  DIAGNOSTIC RECOMMENDATIONS: Will review with GI/Surgery risk of triple therapy prior to proceeding with PCI    < end of copied text >    < from: Xray Chest 1 View AP/PA (06.16.20 @ 10:04) >  FINDINGS:  Lungs:There are no lung consolidations. Small left pleural effusion.  Heart: The heart is normal in size.  Mediastinum: The mediastinum is within normal limits.    IMPRESSION:  No lung consolidations.  Small left pleural effusion.    < end of copied text >

## 2020-06-19 NOTE — PROGRESS NOTE ADULT - ASSESSMENT
49M PMH CAD s/p BMS to RCA (8/2019), RUL subsegmental PE (6/2019, on Eliquis), hx of NSTEMI and PAF s/p ex-lap omentopexy and plication for perforated antral ulcer (5/2019),  CVA s/p tPA (8/2019), HTN and DM2, osteomyelitis, s/p debridement, s/p left TMA 5/2020, without complication p/w bleeding from wound.   Reports that for the last month he has been bleeding from his wound.  He has several units of blood transfused two days ago.  He went to wound care and his wound was felt to be foul smelling/draining.  He was referred to the ER for eval and admission.  In the er his bp was initially marginal.  He dropped his bp to 90/40, and had two episodes of bleeding from the wound, which was wrapped with a pressure dressing.  He developed chest discomfort.  He is getting fluids and blood has been ordered     CAD  - There was initially no evidence of acute ischemia or overload   - Patient with known CAD, has BMS to prox RCA 8/2019, and an occluded RPLs  not intervened upon. Pt known severe LVH with normal LVEF   - Echo 12/24 as delineated above revealing severe LVH, normal systolic function ef 65% grade 2 diastolic dysfunction, mild MR, trace TR   - Currently CP free.   - Currently off of all antiplatelets  - Resume asa when able given PCI.   - Continue Imdur, losartan, metoprolol  - Continue statin	  - watch for HFpEF     A fibrillation   - Has hx of pA fib and PE, on ac  - HR: 60 (06-19 @ 05:33) (57 - 60) per flow sheets   - Resume AC when tolerated  - Continue BB    Hypertension  - BP: 116/65 (06-19-20 @ 05:33) (107/69 - 125/84)  - Continue Imdur, losartan, metoprolol  - Monitor routine hemodynamics     Anemia   - Hemoglobin: 8.5 (06-19-20 @ 08:46) Hematocrit: 27.1 (06-19-20 @ 08:46)  - Transfuse prn with PRBC to keep Hb >8 given CAD    Osteomyelitis Foot  - Per primary/podiatry   - Follow ID recommendations.     - Monitor and replete lytes, keep K>4, Mg>2.  - All other medical needs as per primary team.  - Other cardiovascular workup will depend on clinical course.  - Will continue to follow.    Danny Arce, MS FNP, [facility]  Nurse Practitioner- Cardiology   Spectra #[phone]

## 2020-06-19 NOTE — PROGRESS NOTE ADULT - SUBJECTIVE AND OBJECTIVE BOX
8.5    8.88  )-----------( 367      ( 19 Jun 2020 08:46 )             27.1   49y year old Male seen during AM podiatry rounds with Dr. Hale, s/p left foot TMA revision (DOS: 6/16/20). Patient relates no overnight events and states that they are doing well at this time. Pt relates that his pain is now under control. Denies any fever, chills, nausea, vomiting, chest pain, shortness of breath, or calf pain at this time.    Vital Signs Last 24 Hrs  T(C): 36.5 (19 Jun 2020 04:00), Max: 36.9 (18 Jun 2020 12:34)  T(F): 97.7 (19 Jun 2020 04:00), Max: 98.5 (18 Jun 2020 12:34)  HR: 60 (19 Jun 2020 05:33) (57 - 60)  BP: 116/65 (19 Jun 2020 05:33) (107/69 - 125/84)  BP(mean): --  RR: 17 (19 Jun 2020 04:00) (16 - 18)  SpO2: 96% (19 Jun 2020 04:00) (92% - 96%)    PHYSICAL EXAM:  Vascular: DP & PT palpable, Digital hair absent. erythema and edema noted to the dorsal foot.  Neurological: Light touch sensation intact bilaterally  Musculoskeletal: s/p left TMA w/achilles tenotomy  Dermatological: Incision site of the left foot noted with intact sutures, no dehiscence, mild winston-incision erythema, no proximal streaking, no fluctuance, no malodor, no signs of infection at this time.    Culture - Tissue with Gram Stain (06.17.20 @ 01:59)    Gram Stain:   Rare polymorphonuclear leukocytes seen per low power field  No organisms seen per oil power field    Specimen Source: .Tissue Other, left metatarsal bone    Culture - Tissue with Gram Stain (06.17.20 @ 01:59)    Gram Stain:   Rare polymorphonuclear leukocytes seen per low power field  No organisms seen per oil power field    Specimen Source: .Tissue Other, left metatarsal bone    Culture Results:   Rare Staphylococcus aureus  Rare Enterococcus avium  Rare Enterococcus faecalis    Organism Identification: Enterococcus avium  Enterococcus faecalis    Organism: Enterococcus avium    Organism: Enterococcus faecalis    Method Type: BAYLEE    Method Type: BAYLEE            Imaging: left foot postop radiographs reveal s/p TMA

## 2020-06-19 NOTE — PROGRESS NOTE ADULT - SUBJECTIVE AND OBJECTIVE BOX
Patient is a 49y old  Male who presents with a chief complaint of left foot infection/bleeding (19 Jun 2020 13:20)      INTERVAL /OVERNIGHT EVENTS: pain better    MEDICATIONS  (STANDING):  ascorbic acid 1000 milliGRAM(s) Oral daily  atorvastatin 40 milliGRAM(s) Oral at bedtime  bethanechol 25 milliGRAM(s) Oral three times a day  cefTRIAXone   IVPB 2000 milliGRAM(s) IV Intermittent every 24 hours  dextrose 5%. 1000 milliLiter(s) (50 mL/Hr) IV Continuous <Continuous>  dextrose 50% Injectable 12.5 Gram(s) IV Push once  dextrose 50% Injectable 25 Gram(s) IV Push once  dextrose 50% Injectable 25 Gram(s) IV Push once  famotidine    Tablet 20 milliGRAM(s) Oral every 24 hours  ferrous    sulfate 325 milliGRAM(s) Oral two times a day  insulin lispro (HumaLOG) corrective regimen sliding scale   SubCutaneous three times a day before meals  insulin lispro (HumaLOG) corrective regimen sliding scale   SubCutaneous at bedtime  iron sucrose Injectable 100 milliGRAM(s) IV Push every 24 hours  isosorbide   mononitrate ER Tablet (IMDUR) 30 milliGRAM(s) Oral every 24 hours  losartan 25 milliGRAM(s) Oral daily  methimazole 10 milliGRAM(s) Oral daily  metoprolol tartrate 50 milliGRAM(s) Oral every 12 hours  multivitamin 1 Tablet(s) Oral daily  pantoprazole    Tablet 40 milliGRAM(s) Oral before breakfast  phenazopyridine 100 milliGRAM(s) Oral every 8 hours  potassium chloride    Tablet ER 10 milliEquivalent(s) Oral daily  senna 2 Tablet(s) Oral at bedtime  sucralfate 1 Gram(s) Oral four times a day  tamsulosin 0.8 milliGRAM(s) Oral at bedtime    MEDICATIONS  (PRN):  acetaminophen   Tablet .. 650 milliGRAM(s) Oral every 6 hours PRN Temp greater or equal to 38C (100.4F), Mild Pain (1 - 3)  bisacodyl Suppository 10 milliGRAM(s) Rectal daily PRN Constipation  dextrose 40% Gel 15 Gram(s) Oral once PRN Blood Glucose LESS THAN 70 milliGRAM(s)/deciliter  glucagon  Injectable 1 milliGRAM(s) IntraMuscular once PRN Glucose LESS THAN 70 milligrams/deciliter  morphine  - Injectable 2 milliGRAM(s) IV Push every 6 hours PRN breakthrough pain  ondansetron Injectable 4 milliGRAM(s) IV Push once PRN Nausea and/or Vomiting  oxyCODONE    IR 5 milliGRAM(s) Oral every 6 hours PRN Severe Pain (7 - 10)  traMADol 25 milliGRAM(s) Oral every 6 hours PRN Moderate Pain (4 - 6)      Allergies    fish (Hives)  No Known Drug Allergies    Intolerances        REVIEW OF SYSTEMS:  CONSTITUTIONAL: No fever, weight loss, or fatigue  EYES: No eye pain, visual disturbances, or discharge  ENMT:  No difficulty hearing, tinnitus, vertigo; No sinus or throat pain  NECK: No pain or stiffness  RESPIRATORY: No cough, wheezing, chills or hemoptysis; No shortness of breath  CARDIOVASCULAR: No chest pain, palpitations, dizziness, or leg swelling  GASTROINTESTINAL: No abdominal or epigastric pain. No nausea, vomiting, or hematemesis; No diarrhea or constipation. No melena or hematochezia.  GENITOURINARY: No dysuria, frequency, hematuria, or incontinence  NEUROLOGICAL: No headaches, memory loss, loss of strength, numbness, or tremors  SKIN: No itching, burning, rashes, or lesions   LYMPH NODES: No enlarged glands  ENDOCRINE: No heat or cold intolerance; No hair loss; No polydipsia or polyuria  MUSCULOSKELETAL: No joint pain or swelling; No muscle, back, or extremity pain  PSYCHIATRIC: No depression, anxiety, mood swings, or difficulty sleeping  HEME/LYMPH: No easy bruising, or bleeding gums  ALLERGY AND IMMUNOLOGIC: No hives or eczema    Vital Signs Last 24 Hrs  T(C): 36.8 (19 Jun 2020 14:33), Max: 36.8 (19 Jun 2020 14:33)  T(F): 98.2 (19 Jun 2020 14:33), Max: 98.2 (19 Jun 2020 14:33)  HR: 57 (19 Jun 2020 14:33) (57 - 60)  BP: 108/66 (19 Jun 2020 14:33) (107/69 - 125/84)  BP(mean): --  RR: 17 (19 Jun 2020 14:33) (17 - 18)  SpO2: 96% (19 Jun 2020 14:33) (93% - 96%)    PHYSICAL EXAM:  GENERAL: NAD, well-groomed, well-developed  HEAD:  Atraumatic, Normocephalic  EYES: EOMI, PERRLA, conjunctiva and sclera clear  ENMT: No tonsillar erythema, exudates, or enlargement; Moist mucous membranes, Good dentition, No lesions  NECK: Supple, No JVD, Normal thyroid  NERVOUS SYSTEM:  Alert & Oriented X3, Good concentration; Motor Strength 5/5 B/L upper and lower extremities; DTRs 2+ intact and symmetric  CHEST/LUNG: Clear to auscultation bilaterally; No rales, rhonchi, wheezing, or rubs  HEART: Regular rate and rhythm; No murmurs, rubs, or gallops  ABDOMEN: Soft, Nontender, Nondistended; Bowel sounds present  EXTREMITIES:  2+ Peripheral Pulses, No clubbing, cyanosis, or edema  LYMPH: No lymphadenopathy noted  SKIN: No rashes or lesions    LABS:                        8.5    x     )-----------( x        ( 19 Jun 2020 17:10 )             26.6       Ca    8.0        18 Jun 2020 09:20      PT/INR - ( 18 Jun 2020 09:20 )   PT: 13.8 sec;   INR: 1.22 ratio             CAPILLARY BLOOD GLUCOSE      POCT Blood Glucose.: 338 mg/dL (19 Jun 2020 17:06)  POCT Blood Glucose.: 331 mg/dL (19 Jun 2020 11:52)  POCT Blood Glucose.: 273 mg/dL (19 Jun 2020 08:15)  POCT Blood Glucose.: 244 mg/dL (18 Jun 2020 22:39)      RADIOLOGY & ADDITIONAL TESTS:    Notes Reviewed:  [x ] YES  [ ] NO    Care Discussed with Consultants/Other Providers [x ] YES  [ ] NO

## 2020-06-20 LAB
HCT VFR BLD CALC: 28.6 % — LOW (ref 39–50)
HGB BLD-MCNC: 8.9 G/DL — LOW (ref 13–17)
MCHC RBC-ENTMCNC: 28.3 PG — SIGNIFICANT CHANGE UP (ref 27–34)
MCHC RBC-ENTMCNC: 31.1 GM/DL — LOW (ref 32–36)
MCV RBC AUTO: 90.8 FL — SIGNIFICANT CHANGE UP (ref 80–100)
NRBC # BLD: 0 /100 WBCS — SIGNIFICANT CHANGE UP (ref 0–0)
PLATELET # BLD AUTO: 395 K/UL — SIGNIFICANT CHANGE UP (ref 150–400)
RBC # BLD: 3.15 M/UL — LOW (ref 4.2–5.8)
RBC # FLD: 16.3 % — HIGH (ref 10.3–14.5)
WBC # BLD: 6.82 K/UL — SIGNIFICANT CHANGE UP (ref 3.8–10.5)
WBC # FLD AUTO: 6.82 K/UL — SIGNIFICANT CHANGE UP (ref 3.8–10.5)

## 2020-06-20 PROCEDURE — 99232 SBSQ HOSP IP/OBS MODERATE 35: CPT

## 2020-06-20 PROCEDURE — 99024 POSTOP FOLLOW-UP VISIT: CPT

## 2020-06-20 RX ORDER — GABAPENTIN 400 MG/1
100 CAPSULE ORAL AT BEDTIME
Refills: 0 | Status: DISCONTINUED | OUTPATIENT
Start: 2020-06-20 | End: 2020-06-24

## 2020-06-20 RX ORDER — LOSARTAN POTASSIUM 100 MG/1
25 TABLET, FILM COATED ORAL DAILY
Refills: 0 | Status: DISCONTINUED | OUTPATIENT
Start: 2020-06-20 | End: 2020-06-24

## 2020-06-20 RX ORDER — ENOXAPARIN SODIUM 100 MG/ML
100 INJECTION SUBCUTANEOUS EVERY 12 HOURS
Refills: 0 | Status: DISCONTINUED | OUTPATIENT
Start: 2020-06-20 | End: 2020-06-24

## 2020-06-20 RX ADMIN — Medication 10 MILLIEQUIVALENT(S): at 12:35

## 2020-06-20 RX ADMIN — Medication 1 GRAM(S): at 21:29

## 2020-06-20 RX ADMIN — Medication 325 MILLIGRAM(S): at 17:27

## 2020-06-20 RX ADMIN — SENNA PLUS 2 TABLET(S): 8.6 TABLET ORAL at 21:37

## 2020-06-20 RX ADMIN — LOSARTAN POTASSIUM 25 MILLIGRAM(S): 100 TABLET, FILM COATED ORAL at 12:36

## 2020-06-20 RX ADMIN — PANTOPRAZOLE SODIUM 40 MILLIGRAM(S): 20 TABLET, DELAYED RELEASE ORAL at 08:24

## 2020-06-20 RX ADMIN — Medication 100 MILLIGRAM(S): at 05:10

## 2020-06-20 RX ADMIN — Medication 1 GRAM(S): at 12:35

## 2020-06-20 RX ADMIN — ATORVASTATIN CALCIUM 40 MILLIGRAM(S): 80 TABLET, FILM COATED ORAL at 21:30

## 2020-06-20 RX ADMIN — LOSARTAN POTASSIUM 25 MILLIGRAM(S): 100 TABLET, FILM COATED ORAL at 05:10

## 2020-06-20 RX ADMIN — Medication 1 GRAM(S): at 17:28

## 2020-06-20 RX ADMIN — Medication 25 MILLIGRAM(S): at 15:07

## 2020-06-20 RX ADMIN — Medication 25 MILLIGRAM(S): at 05:11

## 2020-06-20 RX ADMIN — MORPHINE SULFATE 2 MILLIGRAM(S): 50 CAPSULE, EXTENDED RELEASE ORAL at 15:02

## 2020-06-20 RX ADMIN — Medication 100 MILLIGRAM(S): at 21:29

## 2020-06-20 RX ADMIN — Medication 4: at 12:33

## 2020-06-20 RX ADMIN — ENOXAPARIN SODIUM 100 MILLIGRAM(S): 100 INJECTION SUBCUTANEOUS at 17:25

## 2020-06-20 RX ADMIN — CEFTRIAXONE 100 MILLIGRAM(S): 500 INJECTION, POWDER, FOR SOLUTION INTRAMUSCULAR; INTRAVENOUS at 12:19

## 2020-06-20 RX ADMIN — Medication 25 MILLIGRAM(S): at 21:30

## 2020-06-20 RX ADMIN — Medication 3: at 08:28

## 2020-06-20 RX ADMIN — Medication 4: at 17:25

## 2020-06-20 RX ADMIN — Medication 50 MILLIGRAM(S): at 17:25

## 2020-06-20 RX ADMIN — FAMOTIDINE 20 MILLIGRAM(S): 10 INJECTION INTRAVENOUS at 05:10

## 2020-06-20 RX ADMIN — GABAPENTIN 100 MILLIGRAM(S): 400 CAPSULE ORAL at 21:30

## 2020-06-20 RX ADMIN — Medication 1 TABLET(S): at 12:34

## 2020-06-20 RX ADMIN — Medication 100 MILLIGRAM(S): at 15:07

## 2020-06-20 RX ADMIN — Medication 2: at 21:34

## 2020-06-20 RX ADMIN — Medication 1 GRAM(S): at 05:10

## 2020-06-20 RX ADMIN — Medication 1000 MILLIGRAM(S): at 12:35

## 2020-06-20 RX ADMIN — ISOSORBIDE MONONITRATE 30 MILLIGRAM(S): 60 TABLET, EXTENDED RELEASE ORAL at 05:10

## 2020-06-20 RX ADMIN — MORPHINE SULFATE 2 MILLIGRAM(S): 50 CAPSULE, EXTENDED RELEASE ORAL at 23:23

## 2020-06-20 RX ADMIN — OXYCODONE HYDROCHLORIDE 5 MILLIGRAM(S): 5 TABLET ORAL at 21:39

## 2020-06-20 RX ADMIN — TAMSULOSIN HYDROCHLORIDE 0.8 MILLIGRAM(S): 0.4 CAPSULE ORAL at 21:30

## 2020-06-20 RX ADMIN — IRON SUCROSE 100 MILLIGRAM(S): 20 INJECTION, SOLUTION INTRAVENOUS at 15:07

## 2020-06-20 RX ADMIN — Medication 325 MILLIGRAM(S): at 05:10

## 2020-06-20 NOTE — PROGRESS NOTE ADULT - ASSESSMENT
49M PMH CAD s/p BMS to RCA (8/2019), RUL subsegmental PE (6/2019, on Eliquis), hx of NSTEMI and PAF s/p ex-lap omentopexy and plication for perforated antral ulcer (5/2019),  CVA s/p tPA (8/2019), HTN and DM2, osteomyelitis, s/p debridement, s/p left TMA 5/2020, without complication p/w bleeding from wound.   Reports that for the last month he has been bleeding from his wound.  He has several units of blood transfused two days ago.  He went to wound care and his wound was felt to be foul smelling/draining.  He was referred to the ER for eval and admission.  In the er his bp was initially marginal.  He dropped his bp to 90/40, and had two episodes of bleeding from the wound, which was wrapped with a pressure dressing.  He developed chest discomfort.  He is getting fluids and blood has been ordered     CAD  - There was initially no evidence of acute ischemia or overload   - Patient with known CAD, has BMS to prox RCA 8/2019, and an occluded RPLs  not intervened upon. Pt known severe LVH with normal LVEF   - Echo 12/24 as delineated above revealing severe LVH, normal systolic function ef 65% grade 2 diastolic dysfunction, mild MR, trace TR   - Currently CP free.   - Currently off of all antiplatelets  - Resume asa when able given PCI.   - Continue Imdur, losartan, metoprolol  - Continue statin	  - watch for Volume overload     A fibrillation   - Has hx of pA fib and PE, on ac  - HR: 53 (06-20-20 @ 04:00) (53 - 88) per flow sheets   - Resume AC when tolerated  - Continue BB    Hypertension  - BP: 104/60 (06-20-20 @ 04:00) (104/60 - 130/75)  - Continue Imdur, losartan, metoprolol  - Monitor routine hemodynamics     Anemia   - Per primary   - Transfuse prn with PRBC to keep Hb >8 given CAD    Osteomyelitis Foot  - Per primary/podiatry   - Follow ID recommendations.     - Monitor and replete lytes, keep K>4, Mg>2.  - All other medical needs as per primary team.  - Other cardiovascular workup will depend on clinical course.  - Will continue to follow.    Ed Paula DNP, ANP-c  Cardiology   Spectra #[phone]

## 2020-06-20 NOTE — PROGRESS NOTE ADULT - SUBJECTIVE AND OBJECTIVE BOX
Patient is a 49y old  Male who presents with a chief complaint of left foot infection/bleeding (20 Jun 2020 08:36)       Pt is seen and examined  pt is awake and lying in bed/out of bed to chair  pt seems comfortable and denies any complaints at this time    HPI:  49year old Male seen at \[facility]\"" ED for left foot TMA wound. Pt was seen in wound care by Dr. Hale today for his left foot TMA (DOS 5.20.2020) Pt relates he went to [facility] last week for the wound and states that the wound was packed and redressed. Pt presented today and wound was painful and foul smelling.     Denies any fever, chills, nausea, vomiting, chest pain, shortness of breath, or calf pain at this time. (16 Jun 2020 12:13)         ROS:  Negative except for:    MEDICATIONS  (STANDING):  ascorbic acid 1000 milliGRAM(s) Oral daily  atorvastatin 40 milliGRAM(s) Oral at bedtime  bethanechol 25 milliGRAM(s) Oral three times a day  cefTRIAXone   IVPB 2000 milliGRAM(s) IV Intermittent every 24 hours  dextrose 5%. 1000 milliLiter(s) (50 mL/Hr) IV Continuous <Continuous>  dextrose 50% Injectable 12.5 Gram(s) IV Push once  dextrose 50% Injectable 25 Gram(s) IV Push once  dextrose 50% Injectable 25 Gram(s) IV Push once  enoxaparin Injectable 100 milliGRAM(s) SubCutaneous every 12 hours  famotidine    Tablet 20 milliGRAM(s) Oral every 24 hours  ferrous    sulfate 325 milliGRAM(s) Oral two times a day  insulin lispro (HumaLOG) corrective regimen sliding scale   SubCutaneous three times a day before meals  insulin lispro (HumaLOG) corrective regimen sliding scale   SubCutaneous at bedtime  iron sucrose Injectable 100 milliGRAM(s) IV Push every 24 hours  losartan 25 milliGRAM(s) Oral daily  methimazole 10 milliGRAM(s) Oral daily  metoprolol tartrate 50 milliGRAM(s) Oral every 12 hours  multivitamin 1 Tablet(s) Oral daily  pantoprazole    Tablet 40 milliGRAM(s) Oral before breakfast  phenazopyridine 100 milliGRAM(s) Oral every 8 hours  potassium chloride    Tablet ER 10 milliEquivalent(s) Oral daily  senna 2 Tablet(s) Oral at bedtime  sucralfate 1 Gram(s) Oral four times a day  tamsulosin 0.8 milliGRAM(s) Oral at bedtime    MEDICATIONS  (PRN):  acetaminophen   Tablet .. 650 milliGRAM(s) Oral every 6 hours PRN Temp greater or equal to 38C (100.4F), Mild Pain (1 - 3)  bisacodyl Suppository 10 milliGRAM(s) Rectal daily PRN Constipation  dextrose 40% Gel 15 Gram(s) Oral once PRN Blood Glucose LESS THAN 70 milliGRAM(s)/deciliter  glucagon  Injectable 1 milliGRAM(s) IntraMuscular once PRN Glucose LESS THAN 70 milligrams/deciliter  morphine  - Injectable 2 milliGRAM(s) IV Push every 6 hours PRN breakthrough pain  ondansetron Injectable 4 milliGRAM(s) IV Push once PRN Nausea and/or Vomiting  oxyCODONE    IR 5 milliGRAM(s) Oral every 6 hours PRN Severe Pain (7 - 10)  traMADol 25 milliGRAM(s) Oral every 6 hours PRN Moderate Pain (4 - 6)      Allergies    fish (Hives)  No Known Drug Allergies    Intolerances        Vital Signs Last 24 Hrs  T(C): 36.4 (20 Jun 2020 04:00), Max: 36.8 (19 Jun 2020 14:33)  T(F): 97.6 (20 Jun 2020 04:00), Max: 98.2 (19 Jun 2020 14:33)  HR: 53 (20 Jun 2020 04:00) (53 - 88)  BP: 104/60 (20 Jun 2020 04:00) (104/60 - 130/75)  BP(mean): --  RR: 17 (20 Jun 2020 04:00) (17 - 18)  SpO2: 92% (20 Jun 2020 04:00) (92% - 96%)    PHYSICAL EXAM  General: adult in NAD  HEENT: clear oropharynx, anicteric sclera, pink conjunctiva  Neck: supple  CV: normal S1/S2 with no murmur rubs or gallops  Lungs: positive air movement b/l ant lungs,clear to auscultation, no wheezes, no rales  Abdomen: soft non-tender non-distended, no hepatosplenomegaly  Ext: no clubbing cyanosis or edema  Skin: no rashes and no petechiae  Neuro: alert and oriented X 4, no focal deficits  LABS:                          8.9    6.82  )-----------( 395      ( 20 Jun 2020 08:29 )             28.6         Mean Cell Volume : 90.8 fl  Mean Cell Hemoglobin : 28.3 pg  Mean Cell Hemoglobin Concentration : 31.1 gm/dL  Auto Neutrophil # : x  Auto Lymphocyte # : x  Auto Monocyte # : x  Auto Eosinophil # : x  Auto Basophil # : x  Auto Neutrophil % : x  Auto Lymphocyte % : x  Auto Monocyte % : x  Auto Eosinophil % : x  Auto Basophil % : x    Serial CBC's  06-20 @ 08:29  Hct-28.6 / Hgb-8.9 / Plat-395 / RBC-3.15 / WBC-6.82          Serial CBC's  06-19 @ 17:10  Hct-26.6 / Hgb-8.5 / Plat--- / RBC--- / WBC---          Serial CBC's  06-19 @ 08:46  Hct-27.1 / Hgb-8.5 / Plat-367 / RBC-2.99 / WBC-8.88          Serial CBC's  06-18 @ 16:16  Hct--- / Hgb--- / Plat--- / RBC-2.82 / WBC---          Serial CBC's  06-18 @ 09:20  Hct-26.6 / Hgb-8.3 / Plat-321 / RBC-2.96 / WBC-8.80                        Ferritin, Serum: 86 ng/mL (06-19-20 @ 01:18)  Vitamin B12, Serum: 342 pg/mL (06-19-20 @ 01:18)  Folate, Serum: 13.8 ng/mL (06-19-20 @ 01:18)  Iron - Total Binding Capacity.: 201 ug/dL (06-19-20 @ 01:13)  Reticulocyte Percent: 3.9 % (06-18-20 @ 16:16)                BLOOD SMEAR INTERPRETATION:       RADIOLOGY & ADDITIONAL STUDIES: Patient is a 49y old  Male who presents with a chief complaint of left foot infection/bleeding (20 Jun 2020 08:36)       Pt is seen and examined  pt is awake and lying in bed  pt seems comfortable and denies any complaints at this time, left foot in bandage    HPI:  49year old Male seen at [facility] ED for left foot TMA wound. Pt was seen in wound care by Dr. Hale today for his left foot TMA (DOS 5.20.2020) Pt relates he went to [facility] last week for the wound and states that the wound was packed and redressed. Pt presented today and wound was painful and foul smelling.     Denies any fever, chills, nausea, vomiting, chest pain, shortness of breath, or calf pain at this time. (16 Jun 2020 12:13)         ROS:  as per hpi    MEDICATIONS  (STANDING):  ascorbic acid 1000 milliGRAM(s) Oral daily  atorvastatin 40 milliGRAM(s) Oral at bedtime  bethanechol 25 milliGRAM(s) Oral three times a day  cefTRIAXone   IVPB 2000 milliGRAM(s) IV Intermittent every 24 hours  dextrose 5%. 1000 milliLiter(s) (50 mL/Hr) IV Continuous <Continuous>  dextrose 50% Injectable 12.5 Gram(s) IV Push once  dextrose 50% Injectable 25 Gram(s) IV Push once  dextrose 50% Injectable 25 Gram(s) IV Push once  enoxaparin Injectable 100 milliGRAM(s) SubCutaneous every 12 hours  famotidine    Tablet 20 milliGRAM(s) Oral every 24 hours  ferrous    sulfate 325 milliGRAM(s) Oral two times a day  insulin lispro (HumaLOG) corrective regimen sliding scale   SubCutaneous three times a day before meals  insulin lispro (HumaLOG) corrective regimen sliding scale   SubCutaneous at bedtime  iron sucrose Injectable 100 milliGRAM(s) IV Push every 24 hours  losartan 25 milliGRAM(s) Oral daily  methimazole 10 milliGRAM(s) Oral daily  metoprolol tartrate 50 milliGRAM(s) Oral every 12 hours  multivitamin 1 Tablet(s) Oral daily  pantoprazole    Tablet 40 milliGRAM(s) Oral before breakfast  phenazopyridine 100 milliGRAM(s) Oral every 8 hours  potassium chloride    Tablet ER 10 milliEquivalent(s) Oral daily  senna 2 Tablet(s) Oral at bedtime  sucralfate 1 Gram(s) Oral four times a day  tamsulosin 0.8 milliGRAM(s) Oral at bedtime    MEDICATIONS  (PRN):  acetaminophen   Tablet .. 650 milliGRAM(s) Oral every 6 hours PRN Temp greater or equal to 38C (100.4F), Mild Pain (1 - 3)  bisacodyl Suppository 10 milliGRAM(s) Rectal daily PRN Constipation  dextrose 40% Gel 15 Gram(s) Oral once PRN Blood Glucose LESS THAN 70 milliGRAM(s)/deciliter  glucagon  Injectable 1 milliGRAM(s) IntraMuscular once PRN Glucose LESS THAN 70 milligrams/deciliter  morphine  - Injectable 2 milliGRAM(s) IV Push every 6 hours PRN breakthrough pain  ondansetron Injectable 4 milliGRAM(s) IV Push once PRN Nausea and/or Vomiting  oxyCODONE    IR 5 milliGRAM(s) Oral every 6 hours PRN Severe Pain (7 - 10)  traMADol 25 milliGRAM(s) Oral every 6 hours PRN Moderate Pain (4 - 6)      Allergies    fish (Hives)  No Known Drug Allergies    Intolerances        Vital Signs Last 24 Hrs  T(C): 36.4 (20 Jun 2020 04:00), Max: 36.8 (19 Jun 2020 14:33)  T(F): 97.6 (20 Jun 2020 04:00), Max: 98.2 (19 Jun 2020 14:33)  HR: 53 (20 Jun 2020 04:00) (53 - 88)  BP: 104/60 (20 Jun 2020 04:00) (104/60 - 130/75)  BP(mean): --  RR: 17 (20 Jun 2020 04:00) (17 - 18)  SpO2: 92% (20 Jun 2020 04:00) (92% - 96%)    PHYSICAL EXAM  General: adult in NAD  HEENT: clear oropharynx, anicteric sclera, pink conjunctiva  Neck: supple  CV: normal S1/S2 with no murmur rubs or gallops  Lungs: positive air movement b/l ant lungs,clear to auscultation, no wheezes, no rales  Abdomen: soft non-tender non-distended, no hepatosplenomegaly  Ext: no clubbing cyanosis or edema  Skin: no rashes and no petechiae  Neuro: alert and oriented X 4, no focal deficits  LABS:                          8.9    6.82  )-----------( 395      ( 20 Jun 2020 08:29 )             28.6         Mean Cell Volume : 90.8 fl  Mean Cell Hemoglobin : 28.3 pg  Mean Cell Hemoglobin Concentration : 31.1 gm/dL  Auto Neutrophil # : x  Auto Lymphocyte # : x  Auto Monocyte # : x  Auto Eosinophil # : x  Auto Basophil # : x  Auto Neutrophil % : x  Auto Lymphocyte % : x  Auto Monocyte % : x  Auto Eosinophil % : x  Auto Basophil % : x    Serial CBC's  06-20 @ 08:29  Hct-28.6 / Hgb-8.9 / Plat-395 / RBC-3.15 / WBC-6.82          Serial CBC's  06-19 @ 17:10  Hct-26.6 / Hgb-8.5 / Plat--- / RBC--- / WBC---          Serial CBC's  06-19 @ 08:46  Hct-27.1 / Hgb-8.5 / Plat-367 / RBC-2.99 / WBC-8.88          Serial CBC's  06-18 @ 16:16  Hct--- / Hgb--- / Plat--- / RBC-2.82 / WBC---          Serial CBC's  06-18 @ 09:20  Hct-26.6 / Hgb-8.3 / Plat-321 / RBC-2.96 / WBC-8.80                        Ferritin, Serum: 86 ng/mL (06-19-20 @ 01:18)  Vitamin B12, Serum: 342 pg/mL (06-19-20 @ 01:18)  Folate, Serum: 13.8 ng/mL (06-19-20 @ 01:18)  Iron - Total Binding Capacity.: 201 ug/dL (06-19-20 @ 01:13)  Reticulocyte Percent: 3.9 % (06-18-20 @ 16:16)

## 2020-06-20 NOTE — PROGRESS NOTE ADULT - ASSESSMENT
Assessment: 49 y.o M s/p TMA Revision (DOS: 6/16/20)    Plan:  Patient examined and evaluated. Case discussed with Dr. Todd  Surgical site dressing removed. 4 sutures removed from dorsomedial aspect of incision and 5 cc of purulent drainage expressed from the wound. Surgical site was then flushed with betadine and NS and approximately 5cc of coagulated blood was expressed from the surgical site.   Surgical site was then dressed with adaptic, DSD and ACE.  Patient is to be non weight bearing to the left lower extremity.  Antibiotics as per ID recommendations.  Pain Management per medical team        Wound Care Instructions  1. Surgical dressing is to remain clean, dry and intact until follow up appt  2. Pt is to remain NWB to the left foot  3. Patient is to [facility] up [facility] with Dr. Hale or Dr. Todd within 5 days upon discharge.

## 2020-06-20 NOTE — PROGRESS NOTE ADULT - SUBJECTIVE AND OBJECTIVE BOX
Patient is a 49y old  Male who presents with a chief complaint of left foot infection/bleeding (20 Jun 2020 12:04)      INTERVAL /OVERNIGHT EVENTS: pain improved    MEDICATIONS  (STANDING):  ascorbic acid 1000 milliGRAM(s) Oral daily  atorvastatin 40 milliGRAM(s) Oral at bedtime  bethanechol 25 milliGRAM(s) Oral three times a day  cefTRIAXone   IVPB 2000 milliGRAM(s) IV Intermittent every 24 hours  dextrose 5%. 1000 milliLiter(s) (50 mL/Hr) IV Continuous <Continuous>  dextrose 50% Injectable 12.5 Gram(s) IV Push once  dextrose 50% Injectable 25 Gram(s) IV Push once  dextrose 50% Injectable 25 Gram(s) IV Push once  enoxaparin Injectable 100 milliGRAM(s) SubCutaneous every 12 hours  famotidine    Tablet 20 milliGRAM(s) Oral every 24 hours  ferrous    sulfate 325 milliGRAM(s) Oral two times a day  insulin lispro (HumaLOG) corrective regimen sliding scale   SubCutaneous three times a day before meals  insulin lispro (HumaLOG) corrective regimen sliding scale   SubCutaneous at bedtime  iron sucrose Injectable 100 milliGRAM(s) IV Push every 24 hours  losartan 25 milliGRAM(s) Oral daily  methimazole 10 milliGRAM(s) Oral daily  metoprolol tartrate 50 milliGRAM(s) Oral every 12 hours  multivitamin 1 Tablet(s) Oral daily  pantoprazole    Tablet 40 milliGRAM(s) Oral before breakfast  phenazopyridine 100 milliGRAM(s) Oral every 8 hours  potassium chloride    Tablet ER 10 milliEquivalent(s) Oral daily  senna 2 Tablet(s) Oral at bedtime  sucralfate 1 Gram(s) Oral four times a day  tamsulosin 0.8 milliGRAM(s) Oral at bedtime    MEDICATIONS  (PRN):  acetaminophen   Tablet .. 650 milliGRAM(s) Oral every 6 hours PRN Temp greater or equal to 38C (100.4F), Mild Pain (1 - 3)  bisacodyl Suppository 10 milliGRAM(s) Rectal daily PRN Constipation  dextrose 40% Gel 15 Gram(s) Oral once PRN Blood Glucose LESS THAN 70 milliGRAM(s)/deciliter  glucagon  Injectable 1 milliGRAM(s) IntraMuscular once PRN Glucose LESS THAN 70 milligrams/deciliter  morphine  - Injectable 2 milliGRAM(s) IV Push every 6 hours PRN breakthrough pain  ondansetron Injectable 4 milliGRAM(s) IV Push once PRN Nausea and/or Vomiting  oxyCODONE    IR 5 milliGRAM(s) Oral every 6 hours PRN Severe Pain (7 - 10)  traMADol 25 milliGRAM(s) Oral every 6 hours PRN Moderate Pain (4 - 6)      Allergies    fish (Hives)  No Known Drug Allergies    Intolerances        REVIEW OF SYSTEMS:  CONSTITUTIONAL: No fever, weight loss, or fatigue  EYES: No eye pain, visual disturbances, or discharge  ENMT:  No difficulty hearing, tinnitus, vertigo; No sinus or throat pain  NECK: No pain or stiffness  RESPIRATORY: No cough, wheezing, chills or hemoptysis; No shortness of breath  CARDIOVASCULAR: No chest pain, palpitations, dizziness, or leg swelling  GASTROINTESTINAL: No abdominal or epigastric pain. No nausea, vomiting, or hematemesis; No diarrhea or constipation. No melena or hematochezia.  GENITOURINARY: No dysuria, frequency, hematuria, or incontinence  NEUROLOGICAL: No headaches, memory loss, loss of strength, numbness, or tremors  SKIN: No itching, burning, rashes, or lesions   LYMPH NODES: No enlarged glands  ENDOCRINE: No heat or cold intolerance; No hair loss; No polydipsia or polyuria  MUSCULOSKELETAL: No joint pain or swelling; No muscle, back, or extremity pain  PSYCHIATRIC: No depression, anxiety, mood swings, or difficulty sleeping  HEME/LYMPH: No easy bruising, or bleeding gums  ALLERGY AND IMMUNOLOGIC: No hives or eczema    Vital Signs Last 24 Hrs  T(C): 36.4 (20 Jun 2020 04:00), Max: 36.8 (19 Jun 2020 14:33)  T(F): 97.6 (20 Jun 2020 04:00), Max: 98.2 (19 Jun 2020 14:33)  HR: 60 (20 Jun 2020 12:33) (53 - 88)  BP: 121/76 (20 Jun 2020 12:33) (104/60 - 130/75)  BP(mean): --  RR: 17 (20 Jun 2020 04:00) (17 - 18)  SpO2: 92% (20 Jun 2020 04:00) (92% - 96%)    PHYSICAL EXAM:  GENERAL: NAD, well-groomed, well-developed  HEAD:  Atraumatic, Normocephalic  EYES: EOMI, PERRLA, conjunctiva and sclera clear  ENMT: No tonsillar erythema, exudates, or enlargement; Moist mucous membranes, Good dentition, No lesions  NECK: Supple, No JVD, Normal thyroid  NERVOUS SYSTEM:  Alert & Oriented X3, Good concentration; Motor Strength 5/5 B/L upper and lower extremities; DTRs 2+ intact and symmetric  CHEST/LUNG: Clear to auscultation bilaterally; No rales, rhonchi, wheezing, or rubs  HEART: Regular rate and rhythm; No murmurs, rubs, or gallops  ABDOMEN: Soft, Nontender, Nondistended; Bowel sounds present  EXTREMITIES:  2+ Peripheral Pulses, No clubbing, cyanosis, or edema  LYMPH: No lymphadenopathy noted  SKIN: No rashes or lesions    LABS:                        8.9    6.82  )-----------( 395      ( 20 Jun 2020 08:29 )             28.6               CAPILLARY BLOOD GLUCOSE      POCT Blood Glucose.: 331 mg/dL (20 Jun 2020 12:01)  POCT Blood Glucose.: 254 mg/dL (20 Jun 2020 08:27)  POCT Blood Glucose.: 360 mg/dL (19 Jun 2020 21:59)  POCT Blood Glucose.: 338 mg/dL (19 Jun 2020 17:06)      RADIOLOGY & ADDITIONAL TESTS:    Notes Reviewed:  [x ] YES  [ ] NO    Care Discussed with Consultants/Other Providers [x ] YES  [ ] NO

## 2020-06-20 NOTE — PROGRESS NOTE ADULT - SUBJECTIVE AND OBJECTIVE BOX
49y year old Male seen during afternoon podiatry rounds, s/p left foot TMA revision (DOS: 6/16/20). Patient relates no overnight events and states that they are doing well at this time. Pt relates that his pain is now under control. Denies any fever, chills, nausea, vomiting, chest pain, shortness of breath, or calf pain at this time.    Vital Signs Last 24 Hrs  T(C): 36.9 (20 Jun 2020 13:32), Max: 36.9 (20 Jun 2020 13:32)  T(F): 98.4 (20 Jun 2020 13:32), Max: 98.4 (20 Jun 2020 13:32)  HR: 68 (20 Jun 2020 13:32) (53 - 88)  BP: 120/69 (20 Jun 2020 13:32) (104/60 - 130/75)  BP(mean): --  RR: 17 (20 Jun 2020 13:32) (17 - 18)  SpO2: 95% (20 Jun 2020 13:32) (92% - 95%)      PHYSICAL EXAM:  Vascular: DP & PT palpable, Digital hair absent. erythema and edema noted to the dorsal foot.  Dermatological: Incision site of the left foot noted with intact sutures, no dehiscence, mild winston-incision erythema, no proximal streaking, no fluctuance, + malodor, + purulent drainage at dorsomedial incision.  Neurological: Light touch sensation intact bilaterally  Musculoskeletal: s/p left TMA w/achilles tenotomy      Culture - Urine (06.17.20 @ 18:51)    -  Tobramycin: S <=4    -  Tobramycin: S <=4    -  Gentamicin: S <=4    -  Gentamicin: I 8    -  Imipenem: S 2    -  Imipenem: S 2    -  Levofloxacin: R >4    -  Levofloxacin: R >4    -  Meropenem: S <=1    -  Meropenem: S <=1    -  Piperacillin/Tazobactam: S <=16    -  Piperacillin/Tazobactam: S <=16    -  Amikacin: S <=16    -  Amikacin: I 32    -  Aztreonam: S <=4    -  Aztreonam: S 8    -  Cefepime: S <=4    -  Cefepime: S 8    -  Ceftazidime: S 4    -  Ceftazidime: S 4    -  Ciprofloxacin: R >2    -  Ciprofloxacin: R >2    Specimen Source: .Urine Catheterized    Culture Results:   50,000 - 99,000 CFU/mL Pseudomonas aeruginosa  50,000 - 99,000 CFU/mL Pseudomonas aeruginosa #2  Multiple Morphological Strains    Organism Identification: Pseudomonas aeruginosa  Pseudomonas aeruginosa    Organism: Pseudomonas aeruginosa    Organism: Pseudomonas aeruginosa    Method Type: BAYLEE    Method Type: BAYLEE    Culture - Blood (06.16.20 @ 16:36)    Gram Stain:   Growth in anaerobic bottle: Gram Positive Cocci in Clusters    -  Multidrug (KPC pos) resistant organism: Nondet    -  Staphylococcus aureus: Nondet    -  Methicillin resistant Staphylococcus aureus (MRSA): Nondet    -  Coagulase negative Staphylococcus: Nondet    -  Enterococcus species: Nondet    -  Vancomycin resistant Enterococcus sp.: Nondet    -  Escherichia coli: Nondet    -  Klebsiella oxytoca: Nondet    -  Klebsiella pneumoniae: Nondet    -  Serratia marcescens: Nondet    -  Haemophilus influenzae: Nondet    -  Listeria monocytogenes: Nondet    -  Neisseria meningitidis: Nondet    -  Pseudomonas aeruginosa: Nondet    -  Acinetobacter baumanii: Nondet    -  Enterobacter cloacae complex: Nondet    -  Streptococcus sp. (Not Grp A, B or S pneumoniae): Nondet    -  Streptococcus agalactiae (Group B): Nondet    -  Streptococcus pyogenes (Group A): Nondet    -  Streptococcus pneumoniae: Nondet    -  Candida albicans: Nondet    -  Candida glabrata: Nondet    -  Candida krusei: Nondet    -  Candida parapsilosis: Nondet    -  Candida tropicalis: Nondet    Specimen Source: .Blood Blood-Peripheral    Organism: Blood Culture PCR    Culture Results:   Growth in anaerobic bottle: Gram Positive Cocci in Clusters  "Due to technical problems, Proteus sp. will Not be reported as part of  the BCID panel until further notice"  ***Blood Panel PCR results on this specimen are available  approximately 3 hours after the Gram stain result.***  Gram stain, PCR, and/or culture results may not always  correspond due to difference in methodologies.  ************************************************************  This PCR assay was performed using [employer].  The following targets are tested for: Enterococcus,  vancomycin resistant enterococci, Listeria monocytogenes,  coagulase negative staphylococci, S. aureus,  methicillin resistant S. aureus, Streptococcus agalactiae  (Group B), S. pneumoniae, S. pyogenes (Group A),  Acinetobacter baumannii, Enterobacter cloacae, E. coli,  Klebsiella oxytoca, K. pneumoniae, Proteus sp.,  Serratia marcescens, Haemophilus influenzae,  Neisseria meningitidis, Pseudomonas aeruginosa, Candida  albicans, C. glabrata, C krusei, C parapsilosis,  C. tropicalis and the KPC resistance gene.    Organism Identification: Blood Culture PCR    Method Type: PCR    Culture - Tissue with Gram Stain (06.17.20 @ 01:59)    -  Ampicillin: S <=2 Predicts results to ampicillin/sulbactam, amoxacillin-clavulanate and  piperacillin-tazobactam.    -  Ampicillin: S <=2 Predicts results to ampicillin/sulbactam, amoxacillin-clavulanate and  piperacillin-tazobactam.    -  Ampicillin/Sulbactam: S <=8/4    Gram Stain:   Rare polymorphonuclear leukocytes seen per low power field  No organisms seen per oil power field    -  Clindamycin: S <=0.25    -  Erythromycin: S <=0.25    -  Cefazolin: S <=4    -  Trimethoprim/Sulfamethoxazole: S <=0.5/9.5    -  Vancomycin: S 2    -  Vancomycin: S 0.5  Surgical Pathology Report (06.16.20 @ 20:37)    Surgical Pathology Report:   ACCESSION No:  [id number]    SARAH MORRISON                     2        Surgical Final Report          Final Diagnosis    1. Bone, metatarsals, excision:  Fragments of bone showing acute osteomyelitis and focal  mild chronic  osteomyelitis.  Active bone remodeling with new bone formation and focal  marrow fibrosis.    2. Bone, metatarsals, clean margin:  Bone, with adjacent soft tissue, showing mild chronic  osteomyelitis accompanied  by active bone remodeling with new bone formation and focal  marrow  fibrosis.    Verified by: MARIA A SERRATO  (Electronic Signature)  Reported on: 06/19/20 13:05 EDT, [facility], [address] [place] [address], [place] [address]  Phone: [phone]   Fax: [phone]  _________________________________________________________________    Clinical History  Left foot stump osteomyelitis  Procedure: Left TMA site revision    Specimen(s) Submitted  1-Left metatarsals bone  2-Left metatarsals bone, clean margin    Gross Description  1. The specimen is received in formalin and the specimen  container is labeled: Left metatarsals bone pathology. It  consists of four ovoid pieces of tan-yellow bony tissue with  smooth surgical cuts and attached gray-tan soft tissue measuring  in aggregate 3.0 x 2.5x 0.7 cm. Each piece is sectioned. The  specimen is entirely submitted following decalcification. Four  cassettes (one piece per cassette).    2. The specimen is received in formalin and the specimen  container is labeled: Left metatarsals bone cleanmargin  pathology. It consists of three pieces of tan-yellow bony tissue  with smooth surgical cuts measuring in aggregate 2.0 x 1.7 x 0.2  cm. Entirely submitted following decalcification. One cassette.                SARAH MORRISON      2        Surgical Final Report          In addition to other data that may appear on the specimen  containers, all labels have been inspected to confirm the  presence of the patient's name and date of birth.  MELISSA Crane ([facility]) 6/18/2020 12:18 PM    Perioperative Diagnosis  Left foot stump osteomyelitis    Postoperative Diagnosis  Same      -  Vancomycin: S 2    -  RIF- Rifampin: S <=1 Should not be used as monotherapy    -  Tetra/Doxy: S <=1    -  Tetra/Doxy: R >8    -  Tetra/Doxy: R >8    -  Penicillin: R >8    -  Oxacillin: S 0.5    -  Gentamicin: S <=1 Should not be used as monotherapy    Specimen Source: .Tissue Other, left metatarsal bone    Culture Results:   Rare Staphylococcus aureus  Rare Enterococcus avium  Rare Enterococcus faecalis    Organism Identification: Staphylococcus aureus  Enterococcus avium  Enterococcus faecalis    Organism: Enterococcus avium    Organism: Enterococcus faecalis    Organism: Staphylococcus aureus    Method Type: BAYLEE    Method Type: BAYLEE    Method Type: BAYLEE                  Imaging: left foot postop radiographs reveal s/p TMA

## 2020-06-20 NOTE — PROGRESS NOTE ADULT - SUBJECTIVE AND OBJECTIVE BOX
[facility] -- Bolivar Carbajal, El, Brittany, Reynaldo Sweeney Savella  Office # [phone]      Follow Up:    Afib   Subjective/Observations:   No events overnight resting comfortably in bed.  No complaints of chest pain, dyspnea, or palpitations reported. No signs of orthopnea or PND.     REVIEW OF SYSTEMS: All other review of systems is negative unless indicated above    PAST MEDICAL & SURGICAL HISTORY:  Cerebrovascular accident  CAD S/P percutaneous coronary angioplasty  Osteomyelitis: s/p debridement  History of non-ST elevation myocardial infarction (NSTEMI)  Pulmonary embolism  Perforated gastric ulcer: s/p emergent ex-lap omentopexy and plication 6/2019  BPH (benign prostatic hyperplasia)  Hypertension  Afib  Diabetes mellitus with no complication  Diabetes  Traumatic amputation of left foot, initial encounter  Perforated gastric ulcer  H/O abdominal surgery      MEDICATIONS  (STANDING):  ascorbic acid 1000 milliGRAM(s) Oral daily  atorvastatin 40 milliGRAM(s) Oral at bedtime  bethanechol 25 milliGRAM(s) Oral three times a day  cefTRIAXone   IVPB 2000 milliGRAM(s) IV Intermittent every 24 hours  dextrose 5%. 1000 milliLiter(s) (50 mL/Hr) IV Continuous <Continuous>  dextrose 50% Injectable 12.5 Gram(s) IV Push once  dextrose 50% Injectable 25 Gram(s) IV Push once  dextrose 50% Injectable 25 Gram(s) IV Push once  famotidine    Tablet 20 milliGRAM(s) Oral every 24 hours  ferrous    sulfate 325 milliGRAM(s) Oral two times a day  insulin lispro (HumaLOG) corrective regimen sliding scale   SubCutaneous three times a day before meals  insulin lispro (HumaLOG) corrective regimen sliding scale   SubCutaneous at bedtime  iron sucrose Injectable 100 milliGRAM(s) IV Push every 24 hours  isosorbide   mononitrate ER Tablet (IMDUR) 30 milliGRAM(s) Oral every 24 hours  losartan 25 milliGRAM(s) Oral daily  methimazole 10 milliGRAM(s) Oral daily  metoprolol tartrate 50 milliGRAM(s) Oral every 12 hours  multivitamin 1 Tablet(s) Oral daily  pantoprazole    Tablet 40 milliGRAM(s) Oral before breakfast  phenazopyridine 100 milliGRAM(s) Oral every 8 hours  potassium chloride    Tablet ER 10 milliEquivalent(s) Oral daily  senna 2 Tablet(s) Oral at bedtime  sucralfate 1 Gram(s) Oral four times a day  tamsulosin 0.8 milliGRAM(s) Oral at bedtime    MEDICATIONS  (PRN):  acetaminophen   Tablet .. 650 milliGRAM(s) Oral every 6 hours PRN Temp greater or equal to 38C (100.4F), Mild Pain (1 - 3)  bisacodyl Suppository 10 milliGRAM(s) Rectal daily PRN Constipation  dextrose 40% Gel 15 Gram(s) Oral once PRN Blood Glucose LESS THAN 70 milliGRAM(s)/deciliter  glucagon  Injectable 1 milliGRAM(s) IntraMuscular once PRN Glucose LESS THAN 70 milligrams/deciliter  morphine  - Injectable 2 milliGRAM(s) IV Push every 6 hours PRN breakthrough pain  ondansetron Injectable 4 milliGRAM(s) IV Push once PRN Nausea and/or Vomiting  oxyCODONE    IR 5 milliGRAM(s) Oral every 6 hours PRN Severe Pain (7 - 10)  traMADol 25 milliGRAM(s) Oral every 6 hours PRN Moderate Pain (4 - 6)      Allergies    fish (Hives)  No Known Drug Allergies    Intolerances        Vital Signs Last 24 Hrs  T(C): 36.4 (20 Jun 2020 04:00), Max: 36.8 (19 Jun 2020 14:33)  T(F): 97.6 (20 Jun 2020 04:00), Max: 98.2 (19 Jun 2020 14:33)  HR: 53 (20 Jun 2020 04:00) (53 - 88)  BP: 104/60 (20 Jun 2020 04:00) (104/60 - 130/75)  BP(mean): --  RR: 17 (20 Jun 2020 04:00) (17 - 18)  SpO2: 92% (20 Jun 2020 04:00) (92% - 96%)    I&O's Summary    19 Jun 2020 07:01  -  20 Jun 2020 07:00  --------------------------------------------------------  IN: 50 mL / OUT: 1955 mL / NET: -1905 mL          PHYSICAL EXAM:  TELE: Off tele   Constitutional: NAD, awake and alert, well-developed  HEENT: Moist Mucous Membranes, Anicteric  Pulmonary: Non-labored, breath sounds are clear bilaterally, No wheezing, crackles or rhonchi  Cardiovascular: regular, S1 and S2 nl, No murmurs, rubs, gallops or clicks  Gastrointestinal: Bowel Sounds present, soft, nontender.   Lymph: No lymphadenopathy. No peripheral edema.  Skin: No visible rashes or ulcers.  Psych:  Mood & affect appropriate    LABS: All Labs Reviewed:                        8.5    x     )-----------( x        ( 19 Jun 2020 17:10 )             26.6                         8.5    8.88  )-----------( 367      ( 19 Jun 2020 08:46 )             27.1                         8.3    8.80  )-----------( 321      ( 18 Jun 2020 09:20 )             26.6     18 Jun 2020 09:20    139    |  106    |  10     ----------------------------<  261    3.9     |  25     |  0.81     Ca    8.0        18 Jun 2020 09:20  Phos  2.3       18 Jun 2020 09:20  Mg     1.8       18 Jun 2020 09:20    TPro  5.9    /  Alb  2.1    /  TBili  0.5    /  DBili  x      /  AST  10     /  ALT  9      /  AlkPhos  94     18 Jun 2020 09:20    PT/INR - ( 18 Jun 2020 09:20 )   PT: 13.8 sec;   INR: 1.22 ratio             12 Lead ECG:   Ventricular Rate 61 BPM  Atrial Rate 61 BPM  P-R Interval 146 ms  QRS Duration 82 ms  Q-T Interval 416 ms  QTC Calculation(Bezet) 418 ms  P Axis 42 degrees  R Axis 19 degrees  T Axis 54 degrees  Diagnosis Line Normal sinus rhythm  Normal ECG  When compared with ECG of 16-JUN-2020 11:37,  No significant change was found  Confirmed by DAT CALLAHAN ([id number]) on 6/17/2020 4:51:28 PM (06-17-20 @ 08:18)    < from: TTE Echo Doppler w/o Cont (12.24.19 @ 13:55) >  Dimensions:    LA 4.2       Normal Values: 2.0 - 4.0 cm    Ao 4.0        Normal Values: 2.0 - 3.8 cm  SEPTUM 1.6       Normal Values: 0.6 - 1.2 cm  PWT 1.6       Normal Values: 0.6 - 1.1 cm  LVIDd 5.8         Normal Values: 3.0 - 5.6 cm  LVIDs 3.2         Normal Values: 1.8 - 4.0 cm    Derived Variables:  LVMI g/m2  RWT  Fractional Short  Ejection Fraction    Doppler Peak v. AoV= (m/sec)    OBSERVATIONS:    Mitral Valve: normal, mild MR.  Aortic Valve/Aorta: normal trileaflet aortic valve.  Tricuspid Valve: normal with trace TR.  Pulmonic Valve: normal  Left Atrium: Enlarged  Right Atrium: normal  Left Ventricle: Severe concentric LVH with normal systolic function, estimated LVEF of 65%.  Right Ventricle: normal size and systolic function.  Pericardium/Pleura: no significant pleural effusion, no significant pericardial effusion.  Pulmonary/RV Pressure: Inadequate TR jet to estimate PA systolic pressure  LV Diastolic Function: Grade 2diastolic dysfunction.    Severe concentric LVH, and mild LV enlargement, with normal systolic function, estimated LVEF of 65%. Normal right ventricular size and systolic function. Grade 2diastolic dysfunction. Left atrial enlargement The aortic root is normal in size. The mitral valve is structurally normal, mild MR. The aortic valve is trileaflet without stenosis or insufficiency. Trace physiologic TR. Inadequate TR jet to estimate PA systolic pressure No significant pericardial effusion.        < end of copied text >    < from: Cardiac Cath Lab - Adult (08.02.19 @ 08:55) >  VENTRICLES: No left ventriculogram was performed.  CORONARY VESSELS: The coronary circulation is right dominant.  RCA:   --  Proximal RCA: There was a 95 % stenosis.  COMPLICATIONS: There were no complications.  INTERVENTIONAL IMPRESSIONS: Successful stenting of the RCA with BMS  INTERVENTIONAL RECOMMENDATIONS: Aspirin for 1 week. Plavix for 3 weeks. Eliquis for PE (duration TBD) - limit triple therapy for 1 week then continue with plavix eliquis for 3 weeks. This modified DAPT regimen is due to his recent perforated ulcer and surgery.    < end of copied text >    < from: Cardiac Cath Lab - Adult (08.01.19 @ 14:11) >  (Isoptin, Calan, Covera), 2.5 mg, [place]. Heparin, 3000 units, [place].  VENTRICLES: No left ventriculogram was performed.  CORONARY VESSELS: The coronary circulation is right dominant.  LM:   --  LM: Normal.  LAD:   --  Mid LAD: There was a tubular 30 % stenosis.  --  D1: There was a tubular 40 % stenosis.  CX:   --  Circumflex: Angiography showed minor luminal irregularities with no flow limiting lesions.  --  OM1: Angiography showed minor luminal irregularities with no flow limiting lesions.  --  OM2: Angiography showed minor luminal irregularities with no flow limiting lesions.  RCA:   --  Proximal RCA: There was a 95 % stenosis.  --  RPLS: There was a 100 % stenosis.  COMPLICATIONS: There were no complications.  DIAGNOSTIC IMPRESSIONS: Severe RCA disease (95% prox disease, occluded  RPL). Mild LCx and Mild LAD disease.  DIAGNOSTIC RECOMMENDATIONS: Will review with GI/Surgery risk of triple therapy prior to proceeding with PCI    < end of copied text >    < from: Xray Chest 1 View AP/PA (06.16.20 @ 10:04) >  FINDINGS:  Lungs:There are no lung consolidations. Small left pleural effusion.  Heart: The heart is normal in size.  Mediastinum: The mediastinum is within normal limits.    IMPRESSION:  No lung consolidations.  Small left pleural effusion.

## 2020-06-20 NOTE — PROGRESS NOTE ADULT - ASSESSMENT
IMPRESSION:  49/m with multiple co morbid medical history, including hx of cad, s/p mi, h/o pe, h/o a.fibb, h/o abd surgery for perforated gastric ulcer in past who had left foot TMA surgery around 5/20/2020, was admitted around 6/16 for pain, bleeding and redness at wound site, seen by podiatry and underwent revision left foot TMA 6/16/2020 (estimated 300 ml blood loss per surgery), had 3 units prbc , 1 unit platelets and 1 unit ffp this admission, hb was around 10.5 this admission, getting treatment for possible OM as per id and is on abx as per id    RECOMMENDATION:  Anemia--seems multifactorial, likely from blood loss/post surgery, blood loss from wound site with revision tma 6/16  iron profile--low iron, transferrin saturation, patient is on iv iron as looking for a rapid response, day 2 (2/2) today, po iron out patient  hb is at 8.9 today, hb was at 8.5 yesterday  no evidence of hemolysis, negative fobt  monitor h/h--transfuse prbc as needed for symptomatic anemia  h/o pe   hx of a.fibb--is on lovenox  case d/w pt at bedside and is in agreement

## 2020-06-21 DIAGNOSIS — E05.90 THYROTOXICOSIS, UNSPECIFIED WITHOUT THYROTOXIC CRISIS OR STORM: ICD-10-CM

## 2020-06-21 DIAGNOSIS — E11.9 TYPE 2 DIABETES MELLITUS WITHOUT COMPLICATIONS: ICD-10-CM

## 2020-06-21 LAB
CULTURE RESULTS: SIGNIFICANT CHANGE UP
HCT VFR BLD CALC: 30.3 % — LOW (ref 39–50)
HGB BLD-MCNC: 9.3 G/DL — LOW (ref 13–17)
MCHC RBC-ENTMCNC: 28.1 PG — SIGNIFICANT CHANGE UP (ref 27–34)
MCHC RBC-ENTMCNC: 30.7 GM/DL — LOW (ref 32–36)
MCV RBC AUTO: 91.5 FL — SIGNIFICANT CHANGE UP (ref 80–100)
NRBC # BLD: 0 /100 WBCS — SIGNIFICANT CHANGE UP (ref 0–0)
PLATELET # BLD AUTO: 370 K/UL — SIGNIFICANT CHANGE UP (ref 150–400)
RBC # BLD: 3.31 M/UL — LOW (ref 4.2–5.8)
RBC # FLD: 16.3 % — HIGH (ref 10.3–14.5)
SPECIMEN SOURCE: SIGNIFICANT CHANGE UP
WBC # BLD: 8.32 K/UL — SIGNIFICANT CHANGE UP (ref 3.8–10.5)
WBC # FLD AUTO: 8.32 K/UL — SIGNIFICANT CHANGE UP (ref 3.8–10.5)

## 2020-06-21 PROCEDURE — 99232 SBSQ HOSP IP/OBS MODERATE 35: CPT

## 2020-06-21 RX ORDER — INSULIN GLARGINE 100 [IU]/ML
15 INJECTION, SOLUTION SUBCUTANEOUS AT BEDTIME
Refills: 0 | Status: DISCONTINUED | OUTPATIENT
Start: 2020-06-21 | End: 2020-06-24

## 2020-06-21 RX ORDER — AMPICILLIN SODIUM AND SULBACTAM SODIUM 250; 125 MG/ML; MG/ML
3 INJECTION, POWDER, FOR SUSPENSION INTRAMUSCULAR; INTRAVENOUS EVERY 6 HOURS
Refills: 0 | Status: DISCONTINUED | OUTPATIENT
Start: 2020-06-21 | End: 2020-06-23

## 2020-06-21 RX ORDER — INSULIN GLARGINE 100 [IU]/ML
10 INJECTION, SOLUTION SUBCUTANEOUS AT BEDTIME
Refills: 0 | Status: DISCONTINUED | OUTPATIENT
Start: 2020-06-21 | End: 2020-06-21

## 2020-06-21 RX ORDER — INSULIN LISPRO 100/ML
VIAL (ML) SUBCUTANEOUS
Refills: 0 | Status: DISCONTINUED | OUTPATIENT
Start: 2020-06-21 | End: 2020-06-24

## 2020-06-21 RX ORDER — INSULIN LISPRO 100/ML
VIAL (ML) SUBCUTANEOUS AT BEDTIME
Refills: 0 | Status: DISCONTINUED | OUTPATIENT
Start: 2020-06-21 | End: 2020-06-24

## 2020-06-21 RX ORDER — INSULIN LISPRO 100/ML
5 VIAL (ML) SUBCUTANEOUS
Refills: 0 | Status: DISCONTINUED | OUTPATIENT
Start: 2020-06-21 | End: 2020-06-24

## 2020-06-21 RX ADMIN — GABAPENTIN 100 MILLIGRAM(S): 400 CAPSULE ORAL at 21:27

## 2020-06-21 RX ADMIN — Medication 1 TABLET(S): at 12:00

## 2020-06-21 RX ADMIN — Medication 25 MILLIGRAM(S): at 21:27

## 2020-06-21 RX ADMIN — Medication 1 GRAM(S): at 21:28

## 2020-06-21 RX ADMIN — Medication 325 MILLIGRAM(S): at 16:55

## 2020-06-21 RX ADMIN — Medication 1000 MILLIGRAM(S): at 12:00

## 2020-06-21 RX ADMIN — TAMSULOSIN HYDROCHLORIDE 0.8 MILLIGRAM(S): 0.4 CAPSULE ORAL at 21:28

## 2020-06-21 RX ADMIN — Medication 4: at 17:23

## 2020-06-21 RX ADMIN — Medication 1 GRAM(S): at 05:08

## 2020-06-21 RX ADMIN — ENOXAPARIN SODIUM 100 MILLIGRAM(S): 100 INJECTION SUBCUTANEOUS at 16:55

## 2020-06-21 RX ADMIN — Medication 25 MILLIGRAM(S): at 05:08

## 2020-06-21 RX ADMIN — AMPICILLIN SODIUM AND SULBACTAM SODIUM 200 GRAM(S): 250; 125 INJECTION, POWDER, FOR SUSPENSION INTRAMUSCULAR; INTRAVENOUS at 21:36

## 2020-06-21 RX ADMIN — FAMOTIDINE 20 MILLIGRAM(S): 10 INJECTION INTRAVENOUS at 05:08

## 2020-06-21 RX ADMIN — Medication 1 GRAM(S): at 16:55

## 2020-06-21 RX ADMIN — Medication 100 MILLIGRAM(S): at 12:00

## 2020-06-21 RX ADMIN — Medication 1 GRAM(S): at 11:59

## 2020-06-21 RX ADMIN — Medication 325 MILLIGRAM(S): at 05:08

## 2020-06-21 RX ADMIN — Medication 10 MILLIEQUIVALENT(S): at 12:00

## 2020-06-21 RX ADMIN — Medication 100 MILLIGRAM(S): at 21:27

## 2020-06-21 RX ADMIN — PANTOPRAZOLE SODIUM 40 MILLIGRAM(S): 20 TABLET, DELAYED RELEASE ORAL at 08:10

## 2020-06-21 RX ADMIN — Medication 2: at 08:10

## 2020-06-21 RX ADMIN — ENOXAPARIN SODIUM 100 MILLIGRAM(S): 100 INJECTION SUBCUTANEOUS at 05:08

## 2020-06-21 RX ADMIN — Medication 100 MILLIGRAM(S): at 05:09

## 2020-06-21 RX ADMIN — CEFTRIAXONE 100 MILLIGRAM(S): 500 INJECTION, POWDER, FOR SOLUTION INTRAMUSCULAR; INTRAVENOUS at 09:12

## 2020-06-21 RX ADMIN — Medication 4: at 11:59

## 2020-06-21 RX ADMIN — Medication 5 UNIT(S): at 17:23

## 2020-06-21 RX ADMIN — MORPHINE SULFATE 2 MILLIGRAM(S): 50 CAPSULE, EXTENDED RELEASE ORAL at 23:10

## 2020-06-21 RX ADMIN — INSULIN GLARGINE 15 UNIT(S): 100 INJECTION, SOLUTION SUBCUTANEOUS at 21:28

## 2020-06-21 RX ADMIN — OXYCODONE HYDROCHLORIDE 5 MILLIGRAM(S): 5 TABLET ORAL at 21:39

## 2020-06-21 RX ADMIN — Medication 50 MILLIGRAM(S): at 05:08

## 2020-06-21 RX ADMIN — ATORVASTATIN CALCIUM 40 MILLIGRAM(S): 80 TABLET, FILM COATED ORAL at 21:29

## 2020-06-21 RX ADMIN — AMPICILLIN SODIUM AND SULBACTAM SODIUM 200 GRAM(S): 250; 125 INJECTION, POWDER, FOR SUSPENSION INTRAMUSCULAR; INTRAVENOUS at 16:55

## 2020-06-21 RX ADMIN — Medication 25 MILLIGRAM(S): at 12:00

## 2020-06-21 RX ADMIN — LOSARTAN POTASSIUM 25 MILLIGRAM(S): 100 TABLET, FILM COATED ORAL at 05:08

## 2020-06-21 NOTE — PROGRESS NOTE ADULT - SUBJECTIVE AND OBJECTIVE BOX
Patient is a 49y old  Male who presents with a chief complaint of left foot infection/bleeding (21 Jun 2020 13:34)      INTERVAL /OVERNIGHT EVENTS: not eating lunch, drianing from sx site per RN and pt yesterday    MEDICATIONS  (STANDING):  ascorbic acid 1000 milliGRAM(s) Oral daily  atorvastatin 40 milliGRAM(s) Oral at bedtime  bethanechol 25 milliGRAM(s) Oral three times a day  cefTRIAXone   IVPB 2000 milliGRAM(s) IV Intermittent every 24 hours  dextrose 5%. 1000 milliLiter(s) (50 mL/Hr) IV Continuous <Continuous>  dextrose 50% Injectable 12.5 Gram(s) IV Push once  dextrose 50% Injectable 25 Gram(s) IV Push once  dextrose 50% Injectable 25 Gram(s) IV Push once  enoxaparin Injectable 100 milliGRAM(s) SubCutaneous every 12 hours  famotidine    Tablet 20 milliGRAM(s) Oral every 24 hours  ferrous    sulfate 325 milliGRAM(s) Oral two times a day  gabapentin 100 milliGRAM(s) Oral at bedtime  insulin glargine Injectable (LANTUS) 10 Unit(s) SubCutaneous at bedtime  insulin lispro (HumaLOG) corrective regimen sliding scale   SubCutaneous three times a day before meals  insulin lispro (HumaLOG) corrective regimen sliding scale   SubCutaneous at bedtime  insulin lispro Injectable (HumaLOG) 5 Unit(s) SubCutaneous three times a day with meals  losartan 25 milliGRAM(s) Oral daily  methimazole 10 milliGRAM(s) Oral daily  metoprolol tartrate 50 milliGRAM(s) Oral every 12 hours  multivitamin 1 Tablet(s) Oral daily  pantoprazole    Tablet 40 milliGRAM(s) Oral before breakfast  phenazopyridine 100 milliGRAM(s) Oral every 8 hours  potassium chloride    Tablet ER 10 milliEquivalent(s) Oral daily  senna 2 Tablet(s) Oral at bedtime  sucralfate 1 Gram(s) Oral four times a day  tamsulosin 0.8 milliGRAM(s) Oral at bedtime    MEDICATIONS  (PRN):  acetaminophen   Tablet .. 650 milliGRAM(s) Oral every 6 hours PRN Temp greater or equal to 38C (100.4F), Mild Pain (1 - 3)  bisacodyl Suppository 10 milliGRAM(s) Rectal daily PRN Constipation  dextrose 40% Gel 15 Gram(s) Oral once PRN Blood Glucose LESS THAN 70 milliGRAM(s)/deciliter  glucagon  Injectable 1 milliGRAM(s) IntraMuscular once PRN Glucose LESS THAN 70 milligrams/deciliter  morphine  - Injectable 2 milliGRAM(s) IV Push every 6 hours PRN breakthrough pain  ondansetron Injectable 4 milliGRAM(s) IV Push once PRN Nausea and/or Vomiting  oxyCODONE    IR 5 milliGRAM(s) Oral every 6 hours PRN Severe Pain (7 - 10)  traMADol 25 milliGRAM(s) Oral every 6 hours PRN Moderate Pain (4 - 6)      Allergies    fish (Hives)  No Known Drug Allergies    Intolerances        REVIEW OF SYSTEMS:  CONSTITUTIONAL: No fever, weight loss, or fatigue  EYES: No eye pain, visual disturbances, or discharge  ENMT:  No difficulty hearing, tinnitus, vertigo; No sinus or throat pain  NECK: No pain or stiffness  RESPIRATORY: No cough, wheezing, chills or hemoptysis; No shortness of breath  CARDIOVASCULAR: No chest pain, palpitations, dizziness, or leg swelling  GASTROINTESTINAL: No abdominal or epigastric pain. No nausea, vomiting, or hematemesis; No diarrhea or constipation. No melena or hematochezia.  GENITOURINARY: No dysuria, frequency, hematuria, or incontinence  NEUROLOGICAL: No headaches, memory loss, loss of strength, numbness, or tremors  SKIN: No itching, burning, rashes, or lesions   LYMPH NODES: No enlarged glands  ENDOCRINE: No heat or cold intolerance; No hair loss; No polydipsia or polyuria  MUSCULOSKELETAL: No joint pain or swelling; No muscle, back, or extremity pain  PSYCHIATRIC: No depression, anxiety, mood swings, or difficulty sleeping  HEME/LYMPH: No easy bruising, or bleeding gums  ALLERGY AND IMMUNOLOGIC: No hives or eczema    Vital Signs Last 24 Hrs  T(C): 36.8 (21 Jun 2020 05:11), Max: 37.1 (20 Jun 2020 21:29)  T(F): 98.3 (21 Jun 2020 05:11), Max: 98.7 (20 Jun 2020 21:29)  HR: 62 (21 Jun 2020 05:11) (52 - 62)  BP: 121/73 (21 Jun 2020 05:11) (111/71 - 125/77)  BP(mean): --  RR: 18 (21 Jun 2020 05:11) (18 - 18)  SpO2: 98% (21 Jun 2020 05:11) (96% - 98%)    PHYSICAL EXAM:  GENERAL: NAD, well-groomed, well-developed  HEAD:  Atraumatic, Normocephalic  EYES: EOMI, PERRLA, conjunctiva and sclera clear  ENMT: No tonsillar erythema, exudates, or enlargement; Moist mucous membranes, Good dentition, No lesions  NECK: Supple, No JVD, Normal thyroid  NERVOUS SYSTEM:  Alert & Oriented X3, Good concentration; Motor Strength 5/5 B/L upper and lower extremities; DTRs 2+ intact and symmetric  CHEST/LUNG: Clear to auscultation bilaterally; No rales, rhonchi, wheezing, or rubs  HEART: Regular rate and rhythm; No murmurs, rubs, or gallops  ABDOMEN: Soft, Nontender, Nondistended; Bowel sounds present  EXTREMITIES:  2+ Peripheral Pulses, No clubbing, cyanosis, or edema  LYMPH: No lymphadenopathy noted  SKIN: No rashes or lesions    LABS:                        9.3    8.32  )-----------( 370      ( 21 Jun 2020 07:23 )             30.3               CAPILLARY BLOOD GLUCOSE      POCT Blood Glucose.: 332 mg/dL (21 Jun 2020 11:52)  POCT Blood Glucose.: 239 mg/dL (21 Jun 2020 08:00)  POCT Blood Glucose.: 316 mg/dL (20 Jun 2020 21:22)  POCT Blood Glucose.: 319 mg/dL (20 Jun 2020 16:53)      RADIOLOGY & ADDITIONAL TESTS:    Notes Reviewed:  [x ] YES  [ ] NO    Care Discussed with Consultants/Other Providers [x ] YES  [ ] NO

## 2020-06-21 NOTE — PROGRESS NOTE ADULT - ASSESSMENT
49year old Male seen at [facility] ED for left foot TMA wound sp amputation with clean margins, readmitted with wound painful and foul smelling.     Revision of transmetatarsal amputation of left foot 16-Jun-2020

## 2020-06-21 NOTE — PROGRESS NOTE ADULT - SUBJECTIVE AND OBJECTIVE BOX
[facility], Division of Infectious Diseases  DELGADO Rodriguez  [phone]    Name: SARAH MORRISON  Age: 49y  Gender: Male  MRN: [id number]    Interval History--  Notes reviewed  states wound was wrapped yesterday by podiarty and required debridement is starting to have drainage and foul smell    Past Medical History--  Cerebrovascular accident  CAD S/P percutaneous coronary angioplasty  Osteomyelitis  History of non-ST elevation myocardial infarction (NSTEMI)  Pulmonary embolism  Perforated gastric ulcer  BPH (benign prostatic hyperplasia)  Hypertension  Afib  Diabetes mellitus with no complication  Diabetes  Traumatic amputation of left foot, initial encounter  Perforated gastric ulcer  H/O abdominal surgery        For details regarding the patient's social history, family history, and other miscellaneous elements, please refer the initial infectious diseases consultation and/or the admitting history and physical examination for this admission.    Allergies    fish (Hives)  No Known Drug Allergies    Intolerances        Medications--  Antibiotics:  cefTRIAXone   IVPB 2000 milliGRAM(s) IV Intermittent every 24 hours    Immunologic:    Other:  acetaminophen   Tablet .. PRN  ascorbic acid  atorvastatin  bethanechol  bisacodyl Suppository PRN  dextrose 40% Gel PRN  dextrose 5%.  dextrose 50% Injectable  dextrose 50% Injectable  dextrose 50% Injectable  enoxaparin Injectable  famotidine    Tablet  ferrous    sulfate  gabapentin  glucagon  Injectable PRN  insulin glargine Injectable (LANTUS)  insulin lispro (HumaLOG) corrective regimen sliding scale  insulin lispro (HumaLOG) corrective regimen sliding scale  insulin lispro Injectable (HumaLOG)  losartan  methimazole  metoprolol tartrate  morphine  - Injectable PRN  multivitamin  ondansetron Injectable PRN  oxyCODONE    IR PRN  pantoprazole    Tablet  phenazopyridine  potassium chloride    Tablet ER  senna  sucralfate  tamsulosin  traMADol PRN      Review of Systems--  A 10-point review of systems was obtained.     Pertinent positives and negatives--  Constitutional: No fevers. No Chills. No Rigors.   Cardiovascular: No chest pain. No palpitations.  Respiratory: No shortness of breath. No cough.  Gastrointestinal: No nausea or vomiting. No diarrhea or constipation.   Psychiatric: Pleasant. Appropriate affect.    Review of systems otherwise negative except as previously noted.    Physical Examination--  Vital Signs: T(F): 98.3 (06-21-20 @ 13:48), Max: 98.7 (06-20-20 @ 21:29)  HR: 62 (06-21-20 @ 13:48)  BP: 100/65 (06-21-20 @ 13:48)  RR: 18 (06-21-20 @ 13:48)  SpO2: 97% (06-21-20 @ 13:48)  Wt(kg): --  General: Nontoxic-appearing Male in no acute distress.  HEENT: AT/NC. Anicteric.  Neck: Not rigid. No sense of mass.  Nodes: None palpable.  Lungs: Clear bilaterally without rales, wheezing or rhonchi  Heart: Regular rate and rhythm.  Abdomen: Bowel sounds present and normoactive. Soft. Nondistended.   Extremities: No cyanosis or clubbing.  lle edema.  + lle wound wrapped  Skin: Warm. Dry. Good turgor. No rash. No vasculitic stigmata.  Psychiatric: Appropriate affect and mood for situation.         Laboratory Studies--  CBC                        9.3    8.32  )-----------( 370      ( 21 Jun 2020 07:23 )             30.3       Chemistries          Culture Data    Culture - Blood (collected 19 Jun 2020 16:36)  Source: .Blood Blood-Venous  Preliminary Report (20 Jun 2020 17:01):    No growth to date.    Culture - Blood (collected 19 Jun 2020 16:34)  Source: .Blood Blood-Peripheral  Preliminary Report (20 Jun 2020 17:01):    No growth to date.    Culture - Blood (collected 18 Jun 2020 16:40)  Source: .Blood Blood  Preliminary Report (19 Jun 2020 17:01):    No growth to date.    Culture - Blood (collected 18 Jun 2020 16:37)  Source: .Blood Blood  Preliminary Report (19 Jun 2020 17:01):    No growth to date.    Culture - Urine (collected 17 Jun 2020 18:51)  Source: .Urine Catheterized  Final Report (19 Jun 2020 11:57):    50,000 - 99,000 CFU/mL Pseudomonas aeruginosa    50,000 - 99,000 CFU/mL Pseudomonas aeruginosa #2    Multiple Morphological Strains  Organism: Pseudomonas aeruginosa  Pseudomonas aeruginosa (19 Jun 2020 11:57)  Organism: Pseudomonas aeruginosa (19 Jun 2020 11:57)  Organism: Pseudomonas aeruginosa (19 Jun 2020 11:57)    Culture - Tissue with Gram Stain (collected 17 Jun 2020 01:59)  Source: .Tissue Other, left metatarsal bone  Gram Stain (17 Jun 2020 06:15):    Rare polymorphonuclear leukocytes seen per low power field    No organisms seen per oil power field  Preliminary Report (19 Jun 2020 14:04):    Rare Staphylococcus aureus    Rare Enterococcus avium    Rare Enterococcus faecalis  Organism: Staphylococcus aureus  Enterococcus avium  Enterococcus faecalis (19 Jun 2020 13:05)  Organism: Staphylococcus aureus (19 Jun 2020 13:05)  Organism: Enterococcus faecalis (18 Jun 2020 11:29)  Organism: Enterococcus avium (18 Jun 2020 11:29)    Culture - Blood (collected 16 Jun 2020 16:36)  Source: .Blood Blood-Peripheral  Gram Stain (18 Jun 2020 09:20):    Growth in anaerobic bottle: Gram Positive Cocci in Clusters  Preliminary Report (18 Jun 2020 09:20):    Growth in anaerobic bottle: Gram Positive Cocci in Clusters    "Due to technical problems, Proteus sp. will Not be reported as part of    the BCID panel until further notice"    ***Blood Panel PCR results on this specimen are available    approximately 3 hours after the Gram stain result.***    Gram stain, PCR, and/or culture results may not always    correspond due to difference in methodologies.    ************************************************************    This PCR assay was performed using [employer].    The following targets are tested for: Enterococcus,    vancomycin resistant enterococci, Listeria monocytogenes,    coagulase negative staphylococci, S. aureus,    methicillin resistant S. aureus, Streptococcus agalactiae    (Group B), S. pneumoniae, S. pyogenes (Group A),    Acinetobacter baumannii, Enterobacter cloacae, E. coli,    Klebsiella oxytoca, K. pneumoniae, Proteus sp.,    Serratia marcescens, Haemophilus influenzae,    Neisseria meningitidis, Pseudomonas aeruginosa, Candida    albicans, C. glabrata, C krusei, C parapsilosis,    C. tropicalis and the KPC resistance gene.  Organism: Blood Culture PCR (18 Jun 2020 10:44)  Organism: Blood Culture PCR (18 Jun 2020 10:44)    Culture - Blood (collected 16 Jun 2020 16:36)  Source: .Blood Blood-Peripheral  Preliminary Report (17 Jun 2020 17:01):    No growth to date.        Surgical Pathology Report (06.16.20 @ 20:37)    Surgical Pathology Report:   ACCESSION No:  [id number]    SARAH MORRISON 2        Surgical Final Report          Final Diagnosis    1. Bone, metatarsals, excision:  Fragments of bone showing acute osteomyelitis and focal  mild chronic  osteomyelitis.  Active bone remodeling with new bone formation and focal  marrow fibrosis.    2. Bone, metatarsals, clean margin:  Bone, with adjacent soft tissue, showing mild chronic  osteomyelitis accompanied  by active bone remodeling with new bone formation and focal  marrow  fibrosis.    Verified by: MARIA A SERRATO  (Electronic Signature)  Reported on: 06/19/20 13:05 EDT, [facility], [address] [place] [address]  Phone: [phone]   Fax: [phone]  _________________________________________________________________    Clinical History  Left foot stump osteomyelitis  Procedure: Left TMA site revision    Specimen(s) Submitted  1-Left metatarsals bone  2-Left metatarsals bone, clean margin    Gross Description  1. The specimen is received in formalin and the specimen  container is labeled: Left metatarsals bone pathology. It  consists of four ovoid pieces of tan-yellow bony tissue with  smooth surgical cuts and attached gray-tan soft tissue measuring  in aggregate 3.0 x 2.5x 0.7 cm. Each piece is sectioned. The  specimen is entirely submitted following decalcification. Four  cassettes (one piece per cassette).    2. The specimen is received in formalin and the specimen  container is labeled: Left metatarsals bone cleanmargin  pathology. It consists of three pieces of tan-yellow bony tissue  with smooth surgical cuts measuring in aggregate 2.0 x 1.7 x 0.2  cm. Entirely submitted following decalcification. One cassette.                SARAH MORRISON 2        Surgical Final Report          In addition to other data that may appear on the specimen  containers, all labels have been inspected to confirm the  presence of the patient's name and date of birth.  MELISSA Crane ([facility]) 6/18/2020 12:18 PM    Perioperative Diagnosis  Left foot stump osteomyelitis    Postoperative Diagnosis  Same

## 2020-06-21 NOTE — PHYSICAL THERAPY INITIAL EVALUATION ADULT - PERTINENT HX OF CURRENT PROBLEM, REHAB EVAL
Pt is 49 y.o. M who presented with L foot wound s/p L TMA 5/20 dx osteomyelitis and now s/p revision of transmetatarsal amputation 6/14/20.

## 2020-06-21 NOTE — PROGRESS NOTE ADULT - ASSESSMENT
IMPRESSION:  49/m with multiple co morbid medical history, including hx of cad, s/p mi, h/o pe, h/o a.fibb, h/o abd surgery for perforated gastric ulcer in past who had left foot TMA surgery around 5/20/2020, was admitted around 6/16 for pain, bleeding and redness at wound site, seen by podiatry and underwent revision left foot TMA 6/16/2020 (estimated 300 ml blood loss per surgery), had 3 units prbc , 1 unit platelets and 1 unit ffp this admission, hb was around 10.5 this admission, getting treatment for possible OM as per id and is on abx as per id    RECOMMENDATION:  Anemia--seems multifactorial, likely from blood loss/post surgery, blood loss from wound site with revision tma 6/16  iron profile--low iron, transferrin saturation, patient is on iv iron as looking for a rapid response, day 2 (2/2) today, po iron out patient  hb is at 9.3 today, hb was at 8.9 yesterday  no evidence of hemolysis, negative fobt  monitor h/h--transfuse prbc as needed for symptomatic anemia  h/o pe   hx of a.fibb--is on lovenox  case d/w pt at bedside and is in agreement

## 2020-06-21 NOTE — PROGRESS NOTE ADULT - SUBJECTIVE AND OBJECTIVE BOX
NP Progress Note     [facility] -- Bolivar Carbajal, Brittany Gallegos Pannella, Patel, Savella  Office # [phone]      Follow Up: Afib/ left foot infection bleeding     HPI:  49year old Male seen at [facility] ED for left foot TMA wound. Pt was seen in wound care by Dr. Hale today for his left foot TMA (DOS 5.20.2020) Pt relates he went to [facility] last week for the wound and states that the wound was packed and redressed. Pt presented today and wound was painful and foul smelling.     Denies any fever, chills, nausea, vomiting, chest pain, shortness of breath, or calf pain at this time. (16 Jun 2020 12:13)    Subjective/Observations: Pt. seen and examined and evaluated. Pt. resting comfortably in bed in NAD, with no respiratory distress, no chest pain, dyspnea, palpitations, PND, or orthopnea.      REVIEW OF SYSTEMS: All other review of systems is negative unless indicated above    PAST MEDICAL & SURGICAL HISTORY:  Cerebrovascular accident  CAD S/P percutaneous coronary angioplasty  Osteomyelitis: s/p debridement  History of non-ST elevation myocardial infarction (NSTEMI)  Pulmonary embolism  Perforated gastric ulcer: s/p emergent ex-lap omentopexy and plication 6/2019  BPH (benign prostatic hyperplasia)  Hypertension  Afib  Diabetes mellitus with no complication  Diabetes  Traumatic amputation of left foot, initial encounter  Perforated gastric ulcer  H/O abdominal surgery      MEDICATIONS  (STANDING):  ascorbic acid 1000 milliGRAM(s) Oral daily  atorvastatin 40 milliGRAM(s) Oral at bedtime  bethanechol 25 milliGRAM(s) Oral three times a day  cefTRIAXone   IVPB 2000 milliGRAM(s) IV Intermittent every 24 hours  dextrose 5%. 1000 milliLiter(s) (50 mL/Hr) IV Continuous <Continuous>  dextrose 50% Injectable 12.5 Gram(s) IV Push once  dextrose 50% Injectable 25 Gram(s) IV Push once  dextrose 50% Injectable 25 Gram(s) IV Push once  enoxaparin Injectable 100 milliGRAM(s) SubCutaneous every 12 hours  famotidine    Tablet 20 milliGRAM(s) Oral every 24 hours  ferrous    sulfate 325 milliGRAM(s) Oral two times a day  gabapentin 100 milliGRAM(s) Oral at bedtime  insulin lispro (HumaLOG) corrective regimen sliding scale   SubCutaneous three times a day before meals  insulin lispro (HumaLOG) corrective regimen sliding scale   SubCutaneous at bedtime  losartan 25 milliGRAM(s) Oral daily  methimazole 10 milliGRAM(s) Oral daily  metoprolol tartrate 50 milliGRAM(s) Oral every 12 hours  multivitamin 1 Tablet(s) Oral daily  pantoprazole    Tablet 40 milliGRAM(s) Oral before breakfast  phenazopyridine 100 milliGRAM(s) Oral every 8 hours  potassium chloride    Tablet ER 10 milliEquivalent(s) Oral daily  senna 2 Tablet(s) Oral at bedtime  sucralfate 1 Gram(s) Oral four times a day  tamsulosin 0.8 milliGRAM(s) Oral at bedtime    MEDICATIONS  (PRN):  acetaminophen   Tablet .. 650 milliGRAM(s) Oral every 6 hours PRN Temp greater or equal to 38C (100.4F), Mild Pain (1 - 3)  bisacodyl Suppository 10 milliGRAM(s) Rectal daily PRN Constipation  dextrose 40% Gel 15 Gram(s) Oral once PRN Blood Glucose LESS THAN 70 milliGRAM(s)/deciliter  glucagon  Injectable 1 milliGRAM(s) IntraMuscular once PRN Glucose LESS THAN 70 milligrams/deciliter  morphine  - Injectable 2 milliGRAM(s) IV Push every 6 hours PRN breakthrough pain  ondansetron Injectable 4 milliGRAM(s) IV Push once PRN Nausea and/or Vomiting  oxyCODONE    IR 5 milliGRAM(s) Oral every 6 hours PRN Severe Pain (7 - 10)  traMADol 25 milliGRAM(s) Oral every 6 hours PRN Moderate Pain (4 - 6)      Allergies: fish (Hives)  No Known Drug Allergies    Intolerances      Vital Signs Last 24 Hrs  T(C): 36.8 (21 Jun 2020 05:11), Max: 37.1 (20 Jun 2020 21:29)  T(F): 98.3 (21 Jun 2020 05:11), Max: 98.7 (20 Jun 2020 21:29)  HR: 62 (21 Jun 2020 05:11) (52 - 68)  BP: 121/73 (21 Jun 2020 05:11) (111/71 - 125/77)  BP(mean): --  RR: 18 (21 Jun 2020 05:11) (17 - 18)  SpO2: 98% (21 Jun 2020 05:11) (95% - 98%)    I&O's Summary    20 Jun 2020 07:01  -  21 Jun 2020 07:00  --------------------------------------------------------  IN: 50 mL / OUT: 2700 mL / NET: -2650 mL        LABS: All Labs Reviewed:                        9.3    8.32  )-----------( 370      ( 21 Jun 2020 07:23 )             30.3                 Interpretation of Telemetry: Pt. is not on telemetry       Physical Exam:  Appearance: [ ] Normal  [ ] abnormal [X ] NAD   Eyes: [ ] PERRL [ ] EOMI  HEENT: [ ] Normal [ ] Abnormal oral mucosa [ ]NC/AT  Cardiovascular: [x ] S1 [X ] S2 [ ] RRR [ ] m/r/g [ ]edema [ ] JVP  Procedural Access Site: [X] left foot dressing dry and intact    Respiratory: [X ] Clear to auscultation bilaterally  Gastrointestinal: [ ] Soft [ ] tenderness[ ] distension [ ] BS  Musculoskeletal: [ ] clubbing [ ] joint deformity   Neurologic: [ ] Non-focal  Lymphatic: [ ] lymphadenopathy  Psychiatry: [X ] AAOx3  [ ] confused [ ] disoriented [ ] Mood & affect appropriate  Skin: [ ]  rashes [ ] ecchymoses [ ] cyanosis

## 2020-06-21 NOTE — CONSULT NOTE ADULT - PROBLEM SELECTOR RECOMMENDATION 2
good but not tight control    Thank you for consulting us and involving us in the management of this most interesting and challenging case.     We will follow along in the care of this patient.
cont methimazole 10mg daily  check tfts

## 2020-06-21 NOTE — PROGRESS NOTE ADULT - PROBLEM SELECTOR PLAN 1
admit  pan-culture  iv abx per ID dr. pepe  S/P emergent debridement and hemostasis  ID/podiatry consults  transfuse 2 units prbc

## 2020-06-21 NOTE — PROGRESS NOTE ADULT - PROBLEM SELECTOR PLAN 1
sp revisions,  will change to daily ceftriaxone with prior MSSA and now with enterococcus   path -- with clean margins showing chronic osteomyelitis  wound wrapped and cont local care per podiatry   no fever, no leukocytosis sp revisions,  will change to daily ceftriaxone with prior MSSA and now with enterococcus   path -- with clean margins showing chronic osteomyelitis  wound wrapped and cont local care per podiatry   no fever, no leukocytosis  d/w podiatry wound purulent and foul smelling required opening and packing  will change antibx to unasyn

## 2020-06-21 NOTE — PROGRESS NOTE ADULT - SUBJECTIVE AND OBJECTIVE BOX
Patient is a 49y old  Male who presents with a chief complaint of left foot infection/bleeding (21 Jun 2020 08:34)       Pt is seen and examined  pt is awake and lying in bed/out of bed to chair  pt seems comfortable and denies any complaints at this time    HPI:  49year old Male seen at [facility] ED for left foot TMA wound. Pt was seen in wound care by Dr. Hale today for his left foot TMA (DOS 5.20.2020) Pt relates he went to [facility] last week for the wound and states that the wound was packed and redressed. Pt presented today and wound was painful and foul smelling.     Denies any fever, chills, nausea, vomiting, chest pain, shortness of breath, or calf pain at this time. (16 Jun 2020 12:13)         ROS:  Negative except for:    MEDICATIONS  (STANDING):  ascorbic acid 1000 milliGRAM(s) Oral daily  atorvastatin 40 milliGRAM(s) Oral at bedtime  bethanechol 25 milliGRAM(s) Oral three times a day  cefTRIAXone   IVPB 2000 milliGRAM(s) IV Intermittent every 24 hours  dextrose 5%. 1000 milliLiter(s) (50 mL/Hr) IV Continuous <Continuous>  dextrose 50% Injectable 12.5 Gram(s) IV Push once  dextrose 50% Injectable 25 Gram(s) IV Push once  dextrose 50% Injectable 25 Gram(s) IV Push once  enoxaparin Injectable 100 milliGRAM(s) SubCutaneous every 12 hours  famotidine    Tablet 20 milliGRAM(s) Oral every 24 hours  ferrous    sulfate 325 milliGRAM(s) Oral two times a day  gabapentin 100 milliGRAM(s) Oral at bedtime  insulin glargine Injectable (LANTUS) 10 Unit(s) SubCutaneous at bedtime  insulin lispro (HumaLOG) corrective regimen sliding scale   SubCutaneous three times a day before meals  insulin lispro (HumaLOG) corrective regimen sliding scale   SubCutaneous at bedtime  insulin lispro Injectable (HumaLOG) 5 Unit(s) SubCutaneous three times a day with meals  losartan 25 milliGRAM(s) Oral daily  methimazole 10 milliGRAM(s) Oral daily  metoprolol tartrate 50 milliGRAM(s) Oral every 12 hours  multivitamin 1 Tablet(s) Oral daily  pantoprazole    Tablet 40 milliGRAM(s) Oral before breakfast  phenazopyridine 100 milliGRAM(s) Oral every 8 hours  potassium chloride    Tablet ER 10 milliEquivalent(s) Oral daily  senna 2 Tablet(s) Oral at bedtime  sucralfate 1 Gram(s) Oral four times a day  tamsulosin 0.8 milliGRAM(s) Oral at bedtime    MEDICATIONS  (PRN):  acetaminophen   Tablet .. 650 milliGRAM(s) Oral every 6 hours PRN Temp greater or equal to 38C (100.4F), Mild Pain (1 - 3)  bisacodyl Suppository 10 milliGRAM(s) Rectal daily PRN Constipation  dextrose 40% Gel 15 Gram(s) Oral once PRN Blood Glucose LESS THAN 70 milliGRAM(s)/deciliter  glucagon  Injectable 1 milliGRAM(s) IntraMuscular once PRN Glucose LESS THAN 70 milligrams/deciliter  morphine  - Injectable 2 milliGRAM(s) IV Push every 6 hours PRN breakthrough pain  ondansetron Injectable 4 milliGRAM(s) IV Push once PRN Nausea and/or Vomiting  oxyCODONE    IR 5 milliGRAM(s) Oral every 6 hours PRN Severe Pain (7 - 10)  traMADol 25 milliGRAM(s) Oral every 6 hours PRN Moderate Pain (4 - 6)      Allergies    fish (Hives)  No Known Drug Allergies    Intolerances        Vital Signs Last 24 Hrs  T(C): 36.8 (21 Jun 2020 05:11), Max: 37.1 (20 Jun 2020 21:29)  T(F): 98.3 (21 Jun 2020 05:11), Max: 98.7 (20 Jun 2020 21:29)  HR: 62 (21 Jun 2020 05:11) (52 - 62)  BP: 121/73 (21 Jun 2020 05:11) (111/71 - 125/77)  BP(mean): --  RR: 18 (21 Jun 2020 05:11) (18 - 18)  SpO2: 98% (21 Jun 2020 05:11) (96% - 98%)    PHYSICAL EXAM  General: adult in NAD  HEENT: clear oropharynx, anicteric sclera, pink conjunctiva  Neck: supple  CV: normal S1/S2 with no murmur rubs or gallops  Lungs: positive air movement b/l ant lungs,clear to auscultation, no wheezes, no rales  Abdomen: soft non-tender non-distended, no hepatosplenomegaly  Ext: no clubbing cyanosis or edema  Skin: no rashes and no petechiae  Neuro: alert and oriented X 4, no focal deficits  LABS:                          9.3    8.32  )-----------( 370      ( 21 Jun 2020 07:23 )             30.3         Mean Cell Volume : 91.5 fl  Mean Cell Hemoglobin : 28.1 pg  Mean Cell Hemoglobin Concentration : 30.7 gm/dL  Auto Neutrophil # : x  Auto Lymphocyte # : x  Auto Monocyte # : x  Auto Eosinophil # : x  Auto Basophil # : x  Auto Neutrophil % : x  Auto Lymphocyte % : x  Auto Monocyte % : x  Auto Eosinophil % : x  Auto Basophil % : x    Serial CBC's  06-21 @ 07:23  Hct-30.3 / Hgb-9.3 / Plat-370 / RBC-3.31 / WBC-8.32          Serial CBC's  06-20 @ 08:29  Hct-28.6 / Hgb-8.9 / Plat-395 / RBC-3.15 / WBC-6.82          Serial CBC's  06-19 @ 17:10  Hct-26.6 / Hgb-8.5 / Plat--- / RBC--- / WBC---          Serial CBC's  06-19 @ 08:46  Hct-27.1 / Hgb-8.5 / Plat-367 / RBC-2.99 / WBC-8.88          Serial CBC's  06-18 @ 16:16  Hct--- / Hgb--- / Plat--- / RBC-2.82 / WBC---                        Ferritin, Serum: 86 ng/mL (06-19-20 @ 01:18)  Vitamin B12, Serum: 342 pg/mL (06-19-20 @ 01:18)  Folate, Serum: 13.8 ng/mL (06-19-20 @ 01:18)  Iron - Total Binding Capacity.: 201 ug/dL (06-19-20 @ 01:13)  Reticulocyte Percent: 3.9 % (06-18-20 @ 16:16)                BLOOD SMEAR INTERPRETATION:       RADIOLOGY & ADDITIONAL STUDIES: Patient is a 49y old  Male who presents with a chief complaint of left foot infection/bleeding (21 Jun 2020 08:34)       Pt is seen and examined  pt is awake and is out of bed to chair  pt seems comfortable and denies any complaints at this time    HPI:  49year old Male seen at [facility] ED for left foot TMA wound. Pt was seen in wound care by Dr. Hale today for his left foot TMA (DOS 5.20.2020) Pt [facility] and states that the wound was packed and redressed. Pt presented today and wound was painful and foul smelling.     Denies any fever, chills, nausea, vomiting, chest pain, shortness of breath, or calf pain at this time. (16 Jun 2020 12:13)         ROS:  as per hpi    MEDICATIONS  (STANDING):  ascorbic acid 1000 milliGRAM(s) Oral daily  atorvastatin 40 milliGRAM(s) Oral at bedtime  bethanechol 25 milliGRAM(s) Oral three times a day  cefTRIAXone   IVPB 2000 milliGRAM(s) IV Intermittent every 24 hours  dextrose 5%. 1000 milliLiter(s) (50 mL/Hr) IV Continuous <Continuous>  dextrose 50% Injectable 12.5 Gram(s) IV Push once  dextrose 50% Injectable 25 Gram(s) IV Push once  dextrose 50% Injectable 25 Gram(s) IV Push once  enoxaparin Injectable 100 milliGRAM(s) SubCutaneous every 12 hours  famotidine    Tablet 20 milliGRAM(s) Oral every 24 hours  ferrous    sulfate 325 milliGRAM(s) Oral two times a day  gabapentin 100 milliGRAM(s) Oral at bedtime  insulin glargine Injectable (LANTUS) 10 Unit(s) SubCutaneous at bedtime  insulin lispro (HumaLOG) corrective regimen sliding scale   SubCutaneous three times a day before meals  insulin lispro (HumaLOG) corrective regimen sliding scale   SubCutaneous at bedtime  insulin lispro Injectable (HumaLOG) 5 Unit(s) SubCutaneous three times a day with meals  losartan 25 milliGRAM(s) Oral daily  methimazole 10 milliGRAM(s) Oral daily  metoprolol tartrate 50 milliGRAM(s) Oral every 12 hours  multivitamin 1 Tablet(s) Oral daily  pantoprazole    Tablet 40 milliGRAM(s) Oral before breakfast  phenazopyridine 100 milliGRAM(s) Oral every 8 hours  potassium chloride    Tablet ER 10 milliEquivalent(s) Oral daily  senna 2 Tablet(s) Oral at bedtime  sucralfate 1 Gram(s) Oral four times a day  tamsulosin 0.8 milliGRAM(s) Oral at bedtime    MEDICATIONS  (PRN):  acetaminophen   Tablet .. 650 milliGRAM(s) Oral every 6 hours PRN Temp greater or equal to 38C (100.4F), Mild Pain (1 - 3)  bisacodyl Suppository 10 milliGRAM(s) Rectal daily PRN Constipation  dextrose 40% Gel 15 Gram(s) Oral once PRN Blood Glucose LESS THAN 70 milliGRAM(s)/deciliter  glucagon  Injectable 1 milliGRAM(s) IntraMuscular once PRN Glucose LESS THAN 70 milligrams/deciliter  morphine  - Injectable 2 milliGRAM(s) IV Push every 6 hours PRN breakthrough pain  ondansetron Injectable 4 milliGRAM(s) IV Push once PRN Nausea and/or Vomiting  oxyCODONE    IR 5 milliGRAM(s) Oral every 6 hours PRN Severe Pain (7 - 10)  traMADol 25 milliGRAM(s) Oral every 6 hours PRN Moderate Pain (4 - 6)      Allergies    fish (Hives)  No Known Drug Allergies    Intolerances        Vital Signs Last 24 Hrs  T(C): 36.8 (21 Jun 2020 05:11), Max: 37.1 (20 Jun 2020 21:29)  T(F): 98.3 (21 Jun 2020 05:11), Max: 98.7 (20 Jun 2020 21:29)  HR: 62 (21 Jun 2020 05:11) (52 - 62)  BP: 121/73 (21 Jun 2020 05:11) (111/71 - 125/77)  BP(mean): --  RR: 18 (21 Jun 2020 05:11) (18 - 18)  SpO2: 98% (21 Jun 2020 05:11) (96% - 98%)    PHYSICAL EXAM  General: adult in NAD  HEENT: clear oropharynx, anicteric sclera, pink conjunctiva  Neck: supple  CV: normal S1/S2 with no murmur rubs or gallops  Lungs: positive air movement b/l ant lungs,clear to auscultation, no wheezes, no rales  Abdomen: soft non-tender non-distended, no hepatosplenomegaly  Ext: no clubbing cyanosis or edema  Skin: no rashes and no petechiae  Neuro: alert and oriented X 4, no focal deficits  LABS:                          9.3    8.32  )-----------( 370      ( 21 Jun 2020 07:23 )             30.3         Mean Cell Volume : 91.5 fl  Mean Cell Hemoglobin : 28.1 pg  Mean Cell Hemoglobin Concentration : 30.7 gm/dL  Auto Neutrophil # : x  Auto Lymphocyte # : x  Auto Monocyte # : x  Auto Eosinophil # : x  Auto Basophil # : x  Auto Neutrophil % : x  Auto Lymphocyte % : x  Auto Monocyte % : x  Auto Eosinophil % : x  Auto Basophil % : x    Serial CBC's  06-21 @ 07:23  Hct-30.3 / Hgb-9.3 / Plat-370 / RBC-3.31 / WBC-8.32          Serial CBC's  06-20 @ 08:29  Hct-28.6 / Hgb-8.9 / Plat-395 / RBC-3.15 / WBC-6.82          Serial CBC's  06-19 @ 17:10  Hct-26.6 / Hgb-8.5 / Plat--- / RBC--- / WBC---          Serial CBC's  06-19 @ 08:46  Hct-27.1 / Hgb-8.5 / Plat-367 / RBC-2.99 / WBC-8.88          Serial CBC's  06-18 @ 16:16  Hct--- / Hgb--- / Plat--- / RBC-2.82 / WBC---                        Ferritin, Serum: 86 ng/mL (06-19-20 @ 01:18)  Vitamin B12, Serum: 342 pg/mL (06-19-20 @ 01:18)  Folate, Serum: 13.8 ng/mL (06-19-20 @ 01:18)  Iron - Total Binding Capacity.: 201 ug/dL (06-19-20 @ 01:13)  Reticulocyte Percent: 3.9 % (06-18-20 @ 16:16)

## 2020-06-21 NOTE — CONSULT NOTE ADULT - REASON FOR ADMISSION
left foot infection/bleeding

## 2020-06-21 NOTE — PROGRESS NOTE ADULT - ASSESSMENT
49M PMH CAD s/p BMS to RCA (8/2019), RUL subsegmental PE (6/2019, on Eliquis), hx of NSTEMI and PAF s/p ex-lap omentopexy and plication for perforated antral ulcer (5/2019),  CVA s/p tPA (8/2019), HTN and DM2, osteomyelitis, s/p debridement, s/p left TMA 5/2020, without complication p/w bleeding from wound. In the ER his BP was initially marginal.  He dropped his BP to 90/40, and had two episodes of bleeding from the wound, which was wrapped with a pressure dressing, he then developed chest pain. - Currently CP free.      CAD  - There was initially no evidence of acute ischemia or overload   - Patient with known CAD, has BMS to prox RCA 8/2019, and an occluded RPLs not intervened upon. Pt known severe LVH with normal LVEF   - Echo 12/24 as delineated above revealing severe LVH, normal systolic function ef 65% grade 2 diastolic dysfunction, mild MR, trace TR   - Currently off of all antiplatelets  - Resume ASA when able given PCI.   - Continue Imdur, losartan, metoprolol  - Continue statin	  - Monitor for volume overload     Afibrillation   -Continue Lovenox 100mg Q12hrs   - Continue BB    Hypertension  - BP: 104/60 (06-20-20 @ 04:00) (104/60 - 130/75)  - Continue Imdur, losartan, metoprolol  - Monitor routine hemodynamics     Anemia   - Per primary   - Transfuse prn with PRBC to keep Hb >8 given CAD    Osteomyelitis Foot  - Per primary/podiatry   - Follow ID recommendations.     - Monitor and replete lytes, keep K>4, Mg>2.  - All other medical needs as per primary team.  - Other cardiovascular workup will depend on clinical course.  - Will continue to follow.    Bharti Cheney  DNP, ANP-c  Cardiology   Spectra #[id number] [phone]

## 2020-06-21 NOTE — PHYSICAL THERAPY INITIAL EVALUATION ADULT - CRITERIA FOR SKILLED THERAPEUTIC INTERVENTIONS
impairments found/predicted duration of therapy intervention/rehab potential/functional limitations in following categories/risk reduction/prevention/anticipated discharge recommendation/therapy frequency

## 2020-06-21 NOTE — PHYSICAL THERAPY INITIAL EVALUATION ADULT - PREDICTED DURATION OF THERAPY (DAYS/WKS), PT EVAL
Progress Note - Colorectal   Craig Fleeting 37 y o  male MRN: [id number]  Unit/Bed#: [facility] 614-01 Encounter: [id number]      Objective: Feels better this am, using less pain medication, hungry, no nausea, no emesis, no flatus however, no bowel movements  Ambulating halls more  Voiding well on own, urine light flaquito  2650 UA  245 YVAN    Blood pressure 101/65, pulse 85, temperature 98 1 °F (36 7 °C), temperature source Oral, resp  rate 16, height 5' 6" (1 676 m), weight 65 5 kg (144 lb 6 4 oz), SpO2 98 %  ,Body mass index is 23 31 kg/m²  Intake/Output Summary (Last 24 hours) at 06/27/18 1639  Last data filed at 06/27/18 1331   Gross per 24 hour   Intake           1762 5 ml   Output             2360 ml   Net           -597 5 ml       Invasive Devices     Central Venous Catheter Line            Port A Cath 06/25/18 Right Chest 2 days          Line            Pump Device Continuous ambulatory delivery device Right Chest 113 days    Pump Device Continuous ambulatory delivery device Right Chest 99 days          Drain            Closed/Suction Drain Right RLQ Bulb 19 Fr  6 days                Physical Exam:   Abdomen: soft, less distended, scant bowel sounds audible, midline incision clean and dry, YVAN drain right abdomen serosanguinous, patient prefers binder  Extremities: no calf tenderness    Lab, Imaging and other studies:  pending  VTE Pharmacologic Prophylaxis: Heparin  VTE Mechanical Prophylaxis: sequential compression device    Assessment:  POD # 6 Exploratory laparotomy, low anterior resection, segment 7 liver resection    Plan:  1  Hemoglobin stable   diet and pain control per Colorectal surgery  stable from surgical oncology standpoint  I will see again as needed  Follow up in the office in 1 week 
Progress Note - Colorectal   Jose Huerta 37 y o  male MRN: [id number]  Unit/Bed#: [facility] 614-01 Encounter: [id number]      Objective: Feels better this am, using less pain medication, hungry, no nausea, no emesis, no flatus however, no bowel movements  Ambulating halls more  Voiding well on own, urine light flaquito  2650 UA  245 YVAN    Blood pressure 110/79, pulse 76, temperature 99 4 °F (37 4 °C), temperature source Oral, resp  rate 18, height 5' 6" (1 676 m), weight 66 9 kg (147 lb 7 8 oz), SpO2 96 %  ,Body mass index is 23 81 kg/m²  Intake/Output Summary (Last 24 hours) at 06/27/18 0527  Last data filed at 06/27/18 0516   Gross per 24 hour   Intake          2776 66 ml   Output             3935 ml   Net         -1158 34 ml       Invasive Devices     Central Venous Catheter Line            Port A Cath 03/06/18 Right Chest 112 days    Port A Cath 06/25/18 Right Chest 1 day          Line            Pump Device Continuous ambulatory delivery device Right Chest 112 days    Pump Device Continuous ambulatory delivery device Right Chest 98 days          Drain            Closed/Suction Drain Right RLQ Bulb 19 Fr  5 days                Physical Exam:   Abdomen: soft, less distended, scant bowel sounds audible, midline incision clean and dry, YVAN drain right abdomen serosanguinous, patient prefers binder  Extremities: no calf tenderness    Lab, Imaging and other studies:  pending  VTE Pharmacologic Prophylaxis: Heparin  VTE Mechanical Prophylaxis: sequential compression device    Assessment:  POD # 6 Exploratory laparotomy, low anterior resection, segment 7 liver resection    Plan:  1  Continue ambulating the halls 5-6 times daily, OOB for most of the day  2  Continue incentive spirometry  IV fluids to 50 ml/hr since urine output excellent  3 ? Clear/toast  diet  4   Check am labs
2-4 weeks

## 2020-06-22 DIAGNOSIS — M86.9 OSTEOMYELITIS, UNSPECIFIED: ICD-10-CM

## 2020-06-22 LAB
CULTURE RESULTS: SIGNIFICANT CHANGE UP
HCT VFR BLD CALC: 30 % — LOW (ref 39–50)
HGB BLD-MCNC: 9.3 G/DL — LOW (ref 13–17)
MCHC RBC-ENTMCNC: 28.9 PG — SIGNIFICANT CHANGE UP (ref 27–34)
MCHC RBC-ENTMCNC: 31 GM/DL — LOW (ref 32–36)
MCV RBC AUTO: 93.2 FL — SIGNIFICANT CHANGE UP (ref 80–100)
NRBC # BLD: 0 /100 WBCS — SIGNIFICANT CHANGE UP (ref 0–0)
ORGANISM # SPEC MICROSCOPIC CNT: SIGNIFICANT CHANGE UP
PLATELET # BLD AUTO: 367 K/UL — SIGNIFICANT CHANGE UP (ref 150–400)
RBC # BLD: 3.22 M/UL — LOW (ref 4.2–5.8)
RBC # FLD: 16.8 % — HIGH (ref 10.3–14.5)
SPECIMEN SOURCE: SIGNIFICANT CHANGE UP
T3FREE SERPL-MCNC: 2.24 PG/ML — SIGNIFICANT CHANGE UP (ref 1.8–4.6)
T4 FREE SERPL-MCNC: 1.2 NG/DL — SIGNIFICANT CHANGE UP (ref 0.9–1.8)
TSH SERPL-MCNC: 1.95 UIU/ML — SIGNIFICANT CHANGE UP (ref 0.36–3.74)
WBC # BLD: 6.87 K/UL — SIGNIFICANT CHANGE UP (ref 3.8–10.5)
WBC # FLD AUTO: 6.87 K/UL — SIGNIFICANT CHANGE UP (ref 3.8–10.5)

## 2020-06-22 PROCEDURE — 99024 POSTOP FOLLOW-UP VISIT: CPT

## 2020-06-22 PROCEDURE — 99232 SBSQ HOSP IP/OBS MODERATE 35: CPT

## 2020-06-22 RX ADMIN — Medication 1 GRAM(S): at 05:15

## 2020-06-22 RX ADMIN — OXYCODONE HYDROCHLORIDE 5 MILLIGRAM(S): 5 TABLET ORAL at 21:02

## 2020-06-22 RX ADMIN — INSULIN GLARGINE 15 UNIT(S): 100 INJECTION, SOLUTION SUBCUTANEOUS at 21:05

## 2020-06-22 RX ADMIN — Medication 25 MILLIGRAM(S): at 05:15

## 2020-06-22 RX ADMIN — Medication 2: at 13:27

## 2020-06-22 RX ADMIN — Medication 100 MILLIGRAM(S): at 05:15

## 2020-06-22 RX ADMIN — Medication 5 UNIT(S): at 17:46

## 2020-06-22 RX ADMIN — Medication 100 MILLIGRAM(S): at 21:02

## 2020-06-22 RX ADMIN — Medication 1 GRAM(S): at 10:24

## 2020-06-22 RX ADMIN — Medication 1 GRAM(S): at 17:10

## 2020-06-22 RX ADMIN — Medication 25 MILLIGRAM(S): at 21:05

## 2020-06-22 RX ADMIN — GABAPENTIN 100 MILLIGRAM(S): 400 CAPSULE ORAL at 21:02

## 2020-06-22 RX ADMIN — TAMSULOSIN HYDROCHLORIDE 0.8 MILLIGRAM(S): 0.4 CAPSULE ORAL at 21:02

## 2020-06-22 RX ADMIN — MORPHINE SULFATE 2 MILLIGRAM(S): 50 CAPSULE, EXTENDED RELEASE ORAL at 22:36

## 2020-06-22 RX ADMIN — Medication 5 UNIT(S): at 13:27

## 2020-06-22 RX ADMIN — MORPHINE SULFATE 2 MILLIGRAM(S): 50 CAPSULE, EXTENDED RELEASE ORAL at 12:09

## 2020-06-22 RX ADMIN — Medication 1000 MILLIGRAM(S): at 13:23

## 2020-06-22 RX ADMIN — Medication 5 UNIT(S): at 08:31

## 2020-06-22 RX ADMIN — Medication 325 MILLIGRAM(S): at 17:10

## 2020-06-22 RX ADMIN — ATORVASTATIN CALCIUM 40 MILLIGRAM(S): 80 TABLET, FILM COATED ORAL at 21:02

## 2020-06-22 RX ADMIN — ENOXAPARIN SODIUM 100 MILLIGRAM(S): 100 INJECTION SUBCUTANEOUS at 17:10

## 2020-06-22 RX ADMIN — Medication 10 MILLIEQUIVALENT(S): at 10:24

## 2020-06-22 RX ADMIN — OXYCODONE HYDROCHLORIDE 5 MILLIGRAM(S): 5 TABLET ORAL at 10:27

## 2020-06-22 RX ADMIN — Medication 100 MILLIGRAM(S): at 13:24

## 2020-06-22 RX ADMIN — LOSARTAN POTASSIUM 25 MILLIGRAM(S): 100 TABLET, FILM COATED ORAL at 05:15

## 2020-06-22 RX ADMIN — AMPICILLIN SODIUM AND SULBACTAM SODIUM 200 GRAM(S): 250; 125 INJECTION, POWDER, FOR SUSPENSION INTRAMUSCULAR; INTRAVENOUS at 05:16

## 2020-06-22 RX ADMIN — Medication 1 TABLET(S): at 10:24

## 2020-06-22 RX ADMIN — Medication 25 MILLIGRAM(S): at 13:23

## 2020-06-22 RX ADMIN — Medication 2: at 17:46

## 2020-06-22 RX ADMIN — AMPICILLIN SODIUM AND SULBACTAM SODIUM 200 GRAM(S): 250; 125 INJECTION, POWDER, FOR SUSPENSION INTRAMUSCULAR; INTRAVENOUS at 13:25

## 2020-06-22 RX ADMIN — Medication 2: at 08:31

## 2020-06-22 RX ADMIN — AMPICILLIN SODIUM AND SULBACTAM SODIUM 200 GRAM(S): 250; 125 INJECTION, POWDER, FOR SUSPENSION INTRAMUSCULAR; INTRAVENOUS at 21:01

## 2020-06-22 RX ADMIN — Medication 325 MILLIGRAM(S): at 05:15

## 2020-06-22 RX ADMIN — ENOXAPARIN SODIUM 100 MILLIGRAM(S): 100 INJECTION SUBCUTANEOUS at 05:15

## 2020-06-22 RX ADMIN — PANTOPRAZOLE SODIUM 40 MILLIGRAM(S): 20 TABLET, DELAYED RELEASE ORAL at 05:15

## 2020-06-22 RX ADMIN — FAMOTIDINE 20 MILLIGRAM(S): 10 INJECTION INTRAVENOUS at 05:15

## 2020-06-22 NOTE — PROGRESS NOTE ADULT - SUBJECTIVE AND OBJECTIVE BOX
Patient is a 49y old  Male who presents with a chief complaint of left foot infection/bleeding (21 Jun 2020 13:34)      INTERVAL /OVERNIGHT EVENTS: No events over night.      T(C): 36.9 (06-22-20 @ 04:39), Max: 36.9 (06-22-20 @ 04:39)  HR: 56 (06-22-20 @ 04:39) (56 - 56)  BP: 111/75 (06-22-20 @ 04:39) (111/75 - 111/75)  RR: 18 (06-22-20 @ 04:39) (18 - 18)  SpO2: 94% (06-22-20 @ 04:39) (94% - 94%)    Allergies    fish (Hives)  No Known Drug Allergies    Intolerances        REVIEW OF SYSTEMS:  CONSTITUTIONAL: No fever, weight loss, or fatigue  EYES: No eye pain, visual disturbances, or discharge  ENMT:  No difficulty hearing, tinnitus, vertigo; No sinus or throat pain  NECK: No pain or stiffness  RESPIRATORY: No cough, wheezing, chills or hemoptysis; No shortness of breath  CARDIOVASCULAR: No chest pain, palpitations, dizziness, or leg swelling  GASTROINTESTINAL: No abdominal or epigastric pain. No nausea, vomiting, or hematemesis; No diarrhea or constipation. No melena or hematochezia.  GENITOURINARY: No dysuria, frequency, hematuria, or incontinence  NEUROLOGICAL: No headaches, memory loss, loss of strength, numbness, or tremors  SKIN: No itching, burning, rashes, or lesions   LYMPH NODES: No enlarged glands  ENDOCRINE: No heat or cold intolerance; No hair loss; No polydipsia or polyuria  MUSCULOSKELETAL: No joint pain or swelling; No muscle, back, or extremity pain  PSYCHIATRIC: No depression, anxiety, mood swings, or difficulty sleeping  HEME/LYMPH: No easy bruising, or bleeding gums  ALLERGY AND IMMUNOLOGIC: No hives or eczema      PHYSICAL EXAM:  GENERAL: NAD, well-groomed, well-developed  HEAD:  Atraumatic, Normocephalic  EYES: EOMI, conjunctiva and sclera clear  NECK: Supple, No JVD, Normal thyroid  NERVOUS SYSTEM:  Alert & Oriented X3, Good concentration; Motor Strength 5/5 B/L upper and lower extremities; DTRs 2+ intact and symmetric  CHEST/LUNG: Clear to auscultation bilaterally; No rales, rhonchi, wheezing, or rubs  HEART: Regular rate and rhythm; No murmurs, rubs, or gallops  ABDOMEN: Soft, Nontender, Nondistended; Bowel sounds present  EXTREMITIES:  Left foot under dressing   SKIN: No rashes or lesions                          9.3    6.87  )-----------( 367      ( 22 Jun 2020 09:17 )             30.0               141|107|10<139  3.6|27|0.83  8.6,--,--  06-22 @ 09:13  CAPILLARY BLOOD GLUCOSE      POCT Blood Glucose.: 156 mg/dL (22 Jun 2020 13:21)  POCT Blood Glucose.: 166 mg/dL (22 Jun 2020 12:07)  POCT Blood Glucose.: 157 mg/dL (22 Jun 2020 08:21)  POCT Blood Glucose.: 220 mg/dL (21 Jun 2020 21:07)  POCT Blood Glucose.: 216 mg/dL (21 Jun 2020 17:18)    RADIOLOGY & ADDITIONAL TESTS:    Notes Reviewed:  [x ] YES  [ ] NO    Care Discussed with Consultants/Other Providers [x ] YES  [ ] NO

## 2020-06-22 NOTE — PROGRESS NOTE ADULT - PROBLEM SELECTOR PLAN 1
sp revision with noted chronic osteomyelitis in remaining margin sample, micro with enterococcus and MSSA  continue unasyn for now with wound care but as this improves potential change to  Augmentin 875mg PO BID but duration no clear

## 2020-06-22 NOTE — PROGRESS NOTE ADULT - SUBJECTIVE AND OBJECTIVE BOX
Patient is a 49y old  Male who presents with a chief complaint of left foot infection/bleeding (22 Jun 2020 08:34)       Pt is seen and examined  pt is awake and lying in bed/out of bed to chair  pt seems comfortable and denies any complaints at this time    HPI:  49year old Male seen at [facility] ED for left foot TMA wound. Pt was seen in wound care by Dr. Hale today for his left foot TMA (DOS 5.20.2020) Pt relates he went to [facility] last week for the wound and states that the wound was packed and redressed. Pt presented today and wound was painful and foul smelling.     Denies any fever, chills, nausea, vomiting, chest pain, shortness of breath, or calf pain at this time. (16 Jun 2020 12:13)         ROS:  Negative except for:    MEDICATIONS  (STANDING):  ampicillin/sulbactam  IVPB 3 Gram(s) IV Intermittent every 6 hours  ascorbic acid 1000 milliGRAM(s) Oral daily  atorvastatin 40 milliGRAM(s) Oral at bedtime  bethanechol 25 milliGRAM(s) Oral three times a day  dextrose 5%. 1000 milliLiter(s) (50 mL/Hr) IV Continuous <Continuous>  dextrose 50% Injectable 12.5 Gram(s) IV Push once  dextrose 50% Injectable 25 Gram(s) IV Push once  dextrose 50% Injectable 25 Gram(s) IV Push once  enoxaparin Injectable 100 milliGRAM(s) SubCutaneous every 12 hours  famotidine    Tablet 20 milliGRAM(s) Oral every 24 hours  ferrous    sulfate 325 milliGRAM(s) Oral two times a day  gabapentin 100 milliGRAM(s) Oral at bedtime  insulin glargine Injectable (LANTUS) 15 Unit(s) SubCutaneous at bedtime  insulin lispro (HumaLOG) corrective regimen sliding scale   SubCutaneous three times a day before meals  insulin lispro (HumaLOG) corrective regimen sliding scale   SubCutaneous at bedtime  insulin lispro Injectable (HumaLOG) 5 Unit(s) SubCutaneous three times a day with meals  losartan 25 milliGRAM(s) Oral daily  methimazole 10 milliGRAM(s) Oral daily  metoprolol tartrate 50 milliGRAM(s) Oral every 12 hours  multivitamin 1 Tablet(s) Oral daily  pantoprazole    Tablet 40 milliGRAM(s) Oral before breakfast  phenazopyridine 100 milliGRAM(s) Oral every 8 hours  potassium chloride    Tablet ER 10 milliEquivalent(s) Oral daily  senna 2 Tablet(s) Oral at bedtime  sucralfate 1 Gram(s) Oral four times a day  tamsulosin 0.8 milliGRAM(s) Oral at bedtime    MEDICATIONS  (PRN):  acetaminophen   Tablet .. 650 milliGRAM(s) Oral every 6 hours PRN Temp greater or equal to 38C (100.4F), Mild Pain (1 - 3)  bisacodyl Suppository 10 milliGRAM(s) Rectal daily PRN Constipation  dextrose 40% Gel 15 Gram(s) Oral once PRN Blood Glucose LESS THAN 70 milliGRAM(s)/deciliter  glucagon  Injectable 1 milliGRAM(s) IntraMuscular once PRN Glucose LESS THAN 70 milligrams/deciliter  morphine  - Injectable 2 milliGRAM(s) IV Push every 6 hours PRN breakthrough pain  ondansetron Injectable 4 milliGRAM(s) IV Push once PRN Nausea and/or Vomiting  oxyCODONE    IR 5 milliGRAM(s) Oral every 6 hours PRN Severe Pain (7 - 10)  traMADol 25 milliGRAM(s) Oral every 6 hours PRN Moderate Pain (4 - 6)      Allergies    fish (Hives)  No Known Drug Allergies    Intolerances        Vital Signs Last 24 Hrs  T(C): 36.9 (22 Jun 2020 04:39), Max: 36.9 (22 Jun 2020 04:39)  T(F): 98.4 (22 Jun 2020 04:39), Max: 98.4 (22 Jun 2020 04:39)  HR: 56 (22 Jun 2020 04:39) (56 - 64)  BP: 111/75 (22 Jun 2020 04:39) (100/65 - 111/75)  BP(mean): --  RR: 18 (22 Jun 2020 04:39) (17 - 18)  SpO2: 94% (22 Jun 2020 04:39) (94% - 97%)    PHYSICAL EXAM  General: adult in NAD  HEENT: clear oropharynx, anicteric sclera, pink conjunctiva  Neck: supple  CV: normal S1/S2 with no murmur rubs or gallops  Lungs: positive air movement b/l ant lungs,clear to auscultation, no wheezes, no rales  Abdomen: soft non-tender non-distended, no hepatosplenomegaly  Ext: no clubbing cyanosis or edema  Skin: no rashes and no petechiae  Neuro: alert and oriented X 4, no focal deficits  LABS:                          9.3    6.87  )-----------( 367      ( 22 Jun 2020 09:17 )             30.0         Mean Cell Volume : 93.2 fl  Mean Cell Hemoglobin : 28.9 pg  Mean Cell Hemoglobin Concentration : 31.0 gm/dL  Auto Neutrophil # : x  Auto Lymphocyte # : x  Auto Monocyte # : x  Auto Eosinophil # : x  Auto Basophil # : x  Auto Neutrophil % : x  Auto Lymphocyte % : x  Auto Monocyte % : x  Auto Eosinophil % : x  Auto Basophil % : x    Serial CBC's  06-22 @ 09:17  Hct-30.0 / Hgb-9.3 / Plat-367 / RBC-3.22 / WBC-6.87          Serial CBC's  06-21 @ 07:23  Hct-30.3 / Hgb-9.3 / Plat-370 / RBC-3.31 / WBC-8.32          Serial CBC's  06-20 @ 08:29  Hct-28.6 / Hgb-8.9 / Plat-395 / RBC-3.15 / WBC-6.82          Serial CBC's  06-19 @ 17:10  Hct-26.6 / Hgb-8.5 / Plat--- / RBC--- / WBC---          Serial CBC's  06-19 @ 08:46  Hct-27.1 / Hgb-8.5 / Plat-367 / RBC-2.99 / WBC-8.88            06-22    141  |  107  |  10  ----------------------------<  139<H>  3.6   |  27  |  0.83    Ca    8.6      22 Jun 2020 09:13            Ferritin, Serum: 86 ng/mL (06-19-20 @ 01:18)  Vitamin B12, Serum: 342 pg/mL (06-19-20 @ 01:18)  Folate, Serum: 13.8 ng/mL (06-19-20 @ 01:18)  Iron - Total Binding Capacity.: 201 ug/dL (06-19-20 @ 01:13)  Reticulocyte Percent: 3.9 % (06-18-20 @ 16:16)                BLOOD SMEAR INTERPRETATION:       RADIOLOGY & ADDITIONAL STUDIES: Patient is a 49y old  Male who presents with a chief complaint of left foot infection/bleeding (22 Jun 2020 08:34)       Pt is seen and examined  pt is awake and lying in bed  pt seems comfortable and denies any complaints at this time  jacques cath, orange urine, patient is on pyridium    HPI:  49year old Male seen at [facility] for left foot TMA wound. Pt was seen in wound care by Dr. Hale today for his left foot TMA (DOS 5.20.2020) Pt relates he went to [facility] last week for the wound and states that the wound was packed and redressed. Pt presented today and wound was painful and foul smelling.     Denies any fever, chills, nausea, vomiting, chest pain, shortness of breath, or calf pain at this time. (16 Jun 2020 12:13)         ROS:  as per hpi    MEDICATIONS  (STANDING):  ampicillin/sulbactam  IVPB 3 Gram(s) IV Intermittent every 6 hours  ascorbic acid 1000 milliGRAM(s) Oral daily  atorvastatin 40 milliGRAM(s) Oral at bedtime  bethanechol 25 milliGRAM(s) Oral three times a day  dextrose 5%. 1000 milliLiter(s) (50 mL/Hr) IV Continuous <Continuous>  dextrose 50% Injectable 12.5 Gram(s) IV Push once  dextrose 50% Injectable 25 Gram(s) IV Push once  dextrose 50% Injectable 25 Gram(s) IV Push once  enoxaparin Injectable 100 milliGRAM(s) SubCutaneous every 12 hours  famotidine    Tablet 20 milliGRAM(s) Oral every 24 hours  ferrous    sulfate 325 milliGRAM(s) Oral two times a day  gabapentin 100 milliGRAM(s) Oral at bedtime  insulin glargine Injectable (LANTUS) 15 Unit(s) SubCutaneous at bedtime  insulin lispro (HumaLOG) corrective regimen sliding scale   SubCutaneous three times a day before meals  insulin lispro (HumaLOG) corrective regimen sliding scale   SubCutaneous at bedtime  insulin lispro Injectable (HumaLOG) 5 Unit(s) SubCutaneous three times a day with meals  losartan 25 milliGRAM(s) Oral daily  methimazole 10 milliGRAM(s) Oral daily  metoprolol tartrate 50 milliGRAM(s) Oral every 12 hours  multivitamin 1 Tablet(s) Oral daily  pantoprazole    Tablet 40 milliGRAM(s) Oral before breakfast  phenazopyridine 100 milliGRAM(s) Oral every 8 hours  potassium chloride    Tablet ER 10 milliEquivalent(s) Oral daily  senna 2 Tablet(s) Oral at bedtime  sucralfate 1 Gram(s) Oral four times a day  tamsulosin 0.8 milliGRAM(s) Oral at bedtime    MEDICATIONS  (PRN):  acetaminophen   Tablet .. 650 milliGRAM(s) Oral every 6 hours PRN Temp greater or equal to 38C (100.4F), Mild Pain (1 - 3)  bisacodyl Suppository 10 milliGRAM(s) Rectal daily PRN Constipation  dextrose 40% Gel 15 Gram(s) Oral once PRN Blood Glucose LESS THAN 70 milliGRAM(s)/deciliter  glucagon  Injectable 1 milliGRAM(s) IntraMuscular once PRN Glucose LESS THAN 70 milligrams/deciliter  morphine  - Injectable 2 milliGRAM(s) IV Push every 6 hours PRN breakthrough pain  ondansetron Injectable 4 milliGRAM(s) IV Push once PRN Nausea and/or Vomiting  oxyCODONE    IR 5 milliGRAM(s) Oral every 6 hours PRN Severe Pain (7 - 10)  traMADol 25 milliGRAM(s) Oral every 6 hours PRN Moderate Pain (4 - 6)      Allergies    fish (Hives)  No Known Drug Allergies    Intolerances        Vital Signs Last 24 Hrs  T(C): 36.9 (22 Jun 2020 04:39), Max: 36.9 (22 Jun 2020 04:39)  T(F): 98.4 (22 Jun 2020 04:39), Max: 98.4 (22 Jun 2020 04:39)  HR: 56 (22 Jun 2020 04:39) (56 - 64)  BP: 111/75 (22 Jun 2020 04:39) (100/65 - 111/75)  BP(mean): --  RR: 18 (22 Jun 2020 04:39) (17 - 18)  SpO2: 94% (22 Jun 2020 04:39) (94% - 97%)    PHYSICAL EXAM  General: adult in NAD  HEENT: clear oropharynx, anicteric sclera, pink conjunctiva  Neck: supple  CV: normal S1/S2 with no murmur rubs or gallops  Lungs: positive air movement b/l ant lungs,clear to auscultation, no wheezes, no rales  Abdomen: soft non-tender non-distended, no hepatosplenomegaly  Ext: no clubbing cyanosis or edema  Skin: no rashes and no petechiae  Neuro: alert and oriented X 4, no focal deficits  LABS:                          9.3    6.87  )-----------( 367      ( 22 Jun 2020 09:17 )             30.0         Mean Cell Volume : 93.2 fl  Mean Cell Hemoglobin : 28.9 pg  Mean Cell Hemoglobin Concentration : 31.0 gm/dL  Auto Neutrophil # : x  Auto Lymphocyte # : x  Auto Monocyte # : x  Auto Eosinophil # : x  Auto Basophil # : x  Auto Neutrophil % : x  Auto Lymphocyte % : x  Auto Monocyte % : x  Auto Eosinophil % : x  Auto Basophil % : x    Serial CBC's  06-22 @ 09:17  Hct-30.0 / Hgb-9.3 / Plat-367 / RBC-3.22 / WBC-6.87          Serial CBC's  06-21 @ 07:23  Hct-30.3 / Hgb-9.3 / Plat-370 / RBC-3.31 / WBC-8.32          Serial CBC's  06-20 @ 08:29  Hct-28.6 / Hgb-8.9 / Plat-395 / RBC-3.15 / WBC-6.82          Serial CBC's  06-19 @ 17:10  Hct-26.6 / Hgb-8.5 / Plat--- / RBC--- / WBC---          Serial CBC's  06-19 @ 08:46  Hct-27.1 / Hgb-8.5 / Plat-367 / RBC-2.99 / WBC-8.88            06-22    141  |  107  |  10  ----------------------------<  139<H>  3.6   |  27  |  0.83    Ca    8.6      22 Jun 2020 09:13            Ferritin, Serum: 86 ng/mL (06-19-20 @ 01:18)  Vitamin B12, Serum: 342 pg/mL (06-19-20 @ 01:18)  Folate, Serum: 13.8 ng/mL (06-19-20 @ 01:18)  Iron - Total Binding Capacity.: 201 ug/dL (06-19-20 @ 01:13)  Reticulocyte Percent: 3.9 % (06-18-20 @ 16:16)

## 2020-06-22 NOTE — PROGRESS NOTE ADULT - PROBLEM SELECTOR PLAN 1
s/p debridement and dressing per wound care, Podiatry  ID following. OR cultures show MSSA, micrococcus and enterococcus.

## 2020-06-22 NOTE — PROGRESS NOTE ADULT - SUBJECTIVE AND OBJECTIVE BOX
infectious diseases progress note:    SARAH MORRISON is a 49y y. o. Male patient    Pt reporting a few stiches had to be opened with more drainage    Allergies    fish (Hives)  No Known Drug Allergies    Intolerances        ANTIBIOTICS/RELEVANT:  antimicrobials  ampicillin/sulbactam  IVPB 3 Gram(s) IV Intermittent every 6 hours    immunologic:    OTHER:  acetaminophen   Tablet .. 650 milliGRAM(s) Oral every 6 hours PRN  ascorbic acid 1000 milliGRAM(s) Oral daily  atorvastatin 40 milliGRAM(s) Oral at bedtime  bethanechol 25 milliGRAM(s) Oral three times a day  bisacodyl Suppository 10 milliGRAM(s) Rectal daily PRN  dextrose 40% Gel 15 Gram(s) Oral once PRN  dextrose 5%. 1000 milliLiter(s) IV Continuous <Continuous>  dextrose 50% Injectable 12.5 Gram(s) IV Push once  dextrose 50% Injectable 25 Gram(s) IV Push once  dextrose 50% Injectable 25 Gram(s) IV Push once  enoxaparin Injectable 100 milliGRAM(s) SubCutaneous every 12 hours  famotidine    Tablet 20 milliGRAM(s) Oral every 24 hours  ferrous    sulfate 325 milliGRAM(s) Oral two times a day  gabapentin 100 milliGRAM(s) Oral at bedtime  glucagon  Injectable 1 milliGRAM(s) IntraMuscular once PRN  insulin glargine Injectable (LANTUS) 15 Unit(s) SubCutaneous at bedtime  insulin lispro (HumaLOG) corrective regimen sliding scale   SubCutaneous three times a day before meals  insulin lispro (HumaLOG) corrective regimen sliding scale   SubCutaneous at bedtime  insulin lispro Injectable (HumaLOG) 5 Unit(s) SubCutaneous three times a day with meals  losartan 25 milliGRAM(s) Oral daily  methimazole 10 milliGRAM(s) Oral daily  metoprolol tartrate 50 milliGRAM(s) Oral every 12 hours  morphine  - Injectable 2 milliGRAM(s) IV Push every 6 hours PRN  multivitamin 1 Tablet(s) Oral daily  ondansetron Injectable 4 milliGRAM(s) IV Push once PRN  oxyCODONE    IR 5 milliGRAM(s) Oral every 6 hours PRN  pantoprazole    Tablet 40 milliGRAM(s) Oral before breakfast  phenazopyridine 100 milliGRAM(s) Oral every 8 hours  potassium chloride    Tablet ER 10 milliEquivalent(s) Oral daily  senna 2 Tablet(s) Oral at bedtime  sucralfate 1 Gram(s) Oral four times a day  tamsulosin 0.8 milliGRAM(s) Oral at bedtime  traMADol 25 milliGRAM(s) Oral every 6 hours PRN      Objective:  Vital Signs Last 24 Hrs  T(C): 36.9 (22 Jun 2020 04:39), Max: 36.9 (22 Jun 2020 04:39)  T(F): 98.4 (22 Jun 2020 04:39), Max: 98.4 (22 Jun 2020 04:39)  HR: 56 (22 Jun 2020 04:39) (56 - 64)  BP: 111/75 (22 Jun 2020 04:39) (100/65 - 111/75)  BP(mean): --  RR: 18 (22 Jun 2020 04:39) (17 - 18)  SpO2: 94% (22 Jun 2020 04:39) (94% - 97%)    T(C): 36.9 (06-22-20 @ 04:39), Max: 37.1 (06-20-20 @ 21:29)  T(C): 36.9 (06-22-20 @ 04:39), Max: 37.1 (06-20-20 @ 21:29)  T(C): 36.9 (06-22-20 @ 04:39), Max: 37.1 (06-20-20 @ 21:29)    PHYSICAL EXAM:  HEENT: NC atraumatic  Neck: supple  Respiratory: no accessory muscle use, breathing comfortably  Cardiovascular: distant  Gastrointestinal: normal appearing, nondistended  Extremities: no clubbing, no cyanosis, foot wrapped      LABS:                          9.3    6.87  )-----------( 367      ( 22 Jun 2020 09:17 )             30.0       6.87 06-22 @ 09:17  8.32 06-21 @ 07:23  6.82 06-20 @ 08:29  8.88 06-19 @ 08:46  8.80 06-18 @ 09:20  10.68 06-17 @ 07:02  16.09 06-16 @ 18:26  17.61 06-16 @ 12:23  9.14 06-16 @ 10:53      06-22    141  |  107  |  10  ----------------------------<  139<H>  3.6   |  27  |  0.83    Ca    8.6      22 Jun 2020 09:13        Creatinine, Serum: 0.83 mg/dL (06-22-20 @ 09:13)  Creatinine, Serum: 0.81 mg/dL (06-18-20 @ 09:20)  Creatinine, Serum: 0.79 mg/dL (06-17-20 @ 07:02)  Creatinine, Serum: 0.92 mg/dL (06-16-20 @ 10:53)    INFLAMMATORY MARKERS  Auto Neutrophil #: 6.62 K/uL (06-18-20 @ 09:20)  Auto Lymphocyte #: 1.06 K/uL (06-18-20 @ 09:20)  Auto Neutrophil #: 8.15 K/uL (06-17-20 @ 07:02)  Auto Lymphocyte #: 1.36 K/uL (06-17-20 @ 07:02)  Auto Lymphocyte #: 1.11 K/uL (06-16-20 @ 10:53)  Auto Neutrophil #: 6.92 K/uL (06-16-20 @ 10:53)    Lactate, Blood: 0.9 mmol/L (06-17-20 @ 07:02)  Lactate, Blood: 3.7 mmol/L (06-16-20 @ 10:53)    Auto Eosinophil #: 0.21 K/uL (06-18-20 @ 09:20)  Auto Eosinophil #: 0.14 K/uL (06-17-20 @ 07:02)  Auto Eosinophil #: 0.20 K/uL (06-16-20 @ 10:53)      Sedimentation Rate, Erythrocyte: 42 mm/hr (06-16-20 @ 10:53)    Ferritin, Serum: 86 ng/mL (06-19-20 @ 01:18)    INR: 1.22 ratio (06-18-20 @ 09:20)  INR: 1.38 ratio (06-17-20 @ 07:02)  INR: 1.40 ratio (06-16-20 @ 18:26)  Activated Partial Thromboplastin Time: 34.9 sec (06-16-20 @ 18:26)  Activated Partial Thromboplastin Time: 35.2 sec (06-16-20 @ 10:53)  INR: 1.55 ratio (06-16-20 @ 10:53)    MICROBIOLOGY:      RADIOLOGY & ADDITIONAL STUDIES:    Surgical Pathology Report (06.16.20 @ 20:37)    Surgical Pathology Report:   ACCESSION No:  [id number]    SARAH MORRISON                     2        Surgical Final Report          Final Diagnosis    1. Bone, metatarsals, excision:  Fragments of bone showing acute osteomyelitis and focal  mild chronic  osteomyelitis.  Active bone remodeling with new bone formation and focal  marrow fibrosis.    2. Bone, metatarsals, clean margin:  Bone, with adjacent soft tissue, showing mild chronic  osteomyelitis accompanied  by active bone remodeling with new bone formation and focal  marrow  fibrosis.    Verified by: MARIA A SERRATO  (Electronic Signature)  Reported on: 06/19/20 13:05 EDT, [facility], [address] [place] [address], [place] [address]  Phone: [phone]   Fax: [phone]  _________________________________________________________________    Clinical History  Left foot stump osteomyelitis  Procedure: Left TMA site revision    Specimen(s) Submitted  1-Left metatarsals bone  2-Left metatarsals bone, clean margin    Gross Description  1. The specimen is received in formalin and the specimen  container is labeled: Left metatarsals bone pathology. It  consists of four ovoid pieces of tan-yellow bony tissue with  smooth surgical cuts and attached gray-tan soft tissue measuring  in aggregate 3.0 x 2.5x 0.7 cm. Each piece is sectioned. The  specimen is entirely submitted following decalcification. Four  cassettes (one piece per cassette).    2. The specimen is received in formalin and the specimen  container is labeled: Left metatarsals bone clean margin  pathology. It consists of three pieces of tan-yellow bony tissue  with smooth surgical cuts measuring in aggregate 2.0 x 1.7 x 0.2  cm. Entirely submitted following decalcification. One cassette.      SARAH MORRISON 2        Surgical Final Report          In addition to other data that may appear on the specimen  containers, all labels have been inspected to confirm the  presence of the patient's name and date of birth.  MELISSA Crane ([facility]) 6/18/2020 12:18 PM    Perioperative Diagnosis  Left foot stump osteomyelitis    Postoperative Diagnosis  Same

## 2020-06-22 NOTE — PROGRESS NOTE ADULT - PROBLEM SELECTOR PLAN 1
cont lantus 15 units qhs  cont humalog 5 units 3x/day before meals  cont mod dose humalog scale coverage qac/qhs  cont cons cho diet  goal bg 100-180 in hosp setting

## 2020-06-22 NOTE — CHART NOTE - NSCHARTNOTEFT_GEN_A_CORE
Assessment: Attempted to see pt for f/u, 2 attempts made. Pt n/a. Per RN eating ell. BM yesterday. Pt s/p transfusions, and Hba1c now only 6.6, not a good indicator of glu control over last few months. Endo following with insulin changes to control glu(hba1c 11 in MAY, now 6.6). No hypoglycemia noted. No new wt available.    Factors impacting intake: [ ] none [ ] nausea  [ ] vomiting [ ] diarrhea [ ] constipation  [ ]chewing problems [ ] swallowing issues  [ ] other:     Diet Presciption: Diet, Regular:   Consistent Carbohydrate {Evening Snack}  DASH/TLC {Sodium & Cholesterol Restricted} (06-17-20 @ 07:32)    Intake: good per RN    Current Weight: Weight (kg): 100 (06-16 @ 18:17)  % Weight Change    Pertinent Medications: MEDICATIONS  (STANDING):  ampicillin/sulbactam  IVPB 3 Gram(s) IV Intermittent every 6 hours  ascorbic acid 1000 milliGRAM(s) Oral daily  atorvastatin 40 milliGRAM(s) Oral at bedtime  bethanechol 25 milliGRAM(s) Oral three times a day  dextrose 5%. 1000 milliLiter(s) (50 mL/Hr) IV Continuous <Continuous>  dextrose 50% Injectable 12.5 Gram(s) IV Push once  dextrose 50% Injectable 25 Gram(s) IV Push once  dextrose 50% Injectable 25 Gram(s) IV Push once  enoxaparin Injectable 100 milliGRAM(s) SubCutaneous every 12 hours  famotidine    Tablet 20 milliGRAM(s) Oral every 24 hours  ferrous    sulfate 325 milliGRAM(s) Oral two times a day  gabapentin 100 milliGRAM(s) Oral at bedtime  insulin glargine Injectable (LANTUS) 15 Unit(s) SubCutaneous at bedtime  insulin lispro (HumaLOG) corrective regimen sliding scale   SubCutaneous three times a day before meals  insulin lispro (HumaLOG) corrective regimen sliding scale   SubCutaneous at bedtime  insulin lispro Injectable (HumaLOG) 5 Unit(s) SubCutaneous three times a day with meals  losartan 25 milliGRAM(s) Oral daily  methimazole 10 milliGRAM(s) Oral daily  metoprolol tartrate 50 milliGRAM(s) Oral every 12 hours  multivitamin 1 Tablet(s) Oral daily  pantoprazole    Tablet 40 milliGRAM(s) Oral before breakfast  phenazopyridine 100 milliGRAM(s) Oral every 8 hours  potassium chloride    Tablet ER 10 milliEquivalent(s) Oral daily  senna 2 Tablet(s) Oral at bedtime  sucralfate 1 Gram(s) Oral four times a day  tamsulosin 0.8 milliGRAM(s) Oral at bedtime    MEDICATIONS  (PRN):  acetaminophen   Tablet .. 650 milliGRAM(s) Oral every 6 hours PRN Temp greater or equal to 38C (100.4F), Mild Pain (1 - 3)  bisacodyl Suppository 10 milliGRAM(s) Rectal daily PRN Constipation  dextrose 40% Gel 15 Gram(s) Oral once PRN Blood Glucose LESS THAN 70 milliGRAM(s)/deciliter  glucagon  Injectable 1 milliGRAM(s) IntraMuscular once PRN Glucose LESS THAN 70 milligrams/deciliter  morphine  - Injectable 2 milliGRAM(s) IV Push every 6 hours PRN breakthrough pain  ondansetron Injectable 4 milliGRAM(s) IV Push once PRN Nausea and/or Vomiting  oxyCODONE    IR 5 milliGRAM(s) Oral every 6 hours PRN Severe Pain (7 - 10)  traMADol 25 milliGRAM(s) Oral every 6 hours PRN Moderate Pain (4 - 6)    Pertinent Labs: 06-22 Na141 mmol/L Glu 139 mg/dL<H> K+ 3.6 mmol/L Cr  0.83 mg/dL BUN 10 mg/dL 06-18 Phos 2.3 mg/dL<L> 06-18 Alb 2.1 g/dL<L> 06-16 PAB 9 mg/dL<L>     CAPILLARY BLOOD GLUCOSE      POCT Blood Glucose.: 156 mg/dL (22 Jun 2020 13:21)  POCT Blood Glucose.: 166 mg/dL (22 Jun 2020 12:07)  POCT Blood Glucose.: 157 mg/dL (22 Jun 2020 08:21)  POCT Blood Glucose.: 220 mg/dL (21 Jun 2020 21:07)  POCT Blood Glucose.: 216 mg/dL (21 Jun 2020 17:18)    Skin: foot infection, s/p debridement.    Estimated Needs:   [x ] no change since previous assessment  [ ] recalculated:     Previous Nutrition Diagnosis:   [ ] Inadequate Energy Intake [ ]Inadequate Oral Intake [ ] Excessive Energy Intake   [ ] Underweight [x ] Decreased Nutrient Needs [ ] Overweight/Obesity   [ ] Altered GI Function [ ] Unintended Weight Loss [ ] Food & Nutrition Related Knowledge Deficit [ ] Malnutrition     Nutrition Diagnosis is [x ] ongoing  [ ] resolved [ ] not applicable     New Nutrition Diagnosis: [ ] not applicable       Interventions: Continue diet. will be available for education if needed prior to d/c. Will follow as schedule permits.  Recommend  [ ] Change Diet To:  [ ] Nutrition Supplement  [ ] Nutrition Support  [ ] Other:     Monitoring and Evaluation:   [ ] PO intake [ x ] Tolerance to diet prescription [ x ] weights [ x ] labs[ x ] follow up per protocol  [ ] other:

## 2020-06-22 NOTE — PROGRESS NOTE ADULT - ASSESSMENT
Assessment: 49 y.o M s/p TMA Revision (DOS: 6/16/20)    Plan:  Patient examined and evaluated with Dr. Todd  Surgical site dressing removed with no evidence of mal odor after change of antibiotic by I.D.  Approximately 5 cc of purulent drainage expressed from the wound dorsomedial aspect of incision and  Surgical site was then flushed with NS.  Surgical site was then dressed with DSD and ACE.  Patient is to be non weight bearing to the left lower extremity.  Antibiotics as per ID recommendations.  Pain Management per medical team        Wound Care Instructions  1. Surgical dressing is to remain clean, dry and intact until follow up appt  2. Pt is to remain NWB to the left foot  3. Patient is to follow up [facility] with Dr. Hale or Dr. Todd within 5 days upon discharge.

## 2020-06-22 NOTE — PROGRESS NOTE ADULT - SUBJECTIVE AND OBJECTIVE BOX
[facility] -- Bolivar Carbajal Grossman, [place], Reynaldo Sweeney Savella, Goodger: Office # [phone]    Follow Up: Afib/ left foot infection bleeding       Subjective/Observations: Patient seen and examined. Patient awake and alert, resting comfortably in bed. No complaints of chest pain, SOB, LE edema, cough. No signs of orthopnea or PND. No bleeding from foot at present     REVIEW OF SYSTEMS: All review of systems is negative for eye, ENT, GI, , allergic, dermatologic, musculoskeletal and neurologic except as described above    PAST MEDICAL & SURGICAL HISTORY:  Cerebrovascular accident  CAD S/P percutaneous coronary angioplasty  Osteomyelitis: s/p debridement  History of non-ST elevation myocardial infarction (NSTEMI)  Pulmonary embolism  Perforated gastric ulcer: s/p emergent ex-lap omentopexy and plication 6/2019  BPH (benign prostatic hyperplasia)  Hypertension  Hypertension  Afib  Diabetes mellitus with no complication  Diabetes  Traumatic amputation of left foot, initial encounter  Perforated gastric ulcer  H/O abdominal surgery    MEDICATIONS  (STANDING):  ampicillin/sulbactam  IVPB 3 Gram(s) IV Intermittent every 6 hours  ascorbic acid 1000 milliGRAM(s) Oral daily  atorvastatin 40 milliGRAM(s) Oral at bedtime  bethanechol 25 milliGRAM(s) Oral three times a day  dextrose 5%. 1000 milliLiter(s) (50 mL/Hr) IV Continuous <Continuous>  dextrose 50% Injectable 12.5 Gram(s) IV Push once  dextrose 50% Injectable 25 Gram(s) IV Push once  dextrose 50% Injectable 25 Gram(s) IV Push once  enoxaparin Injectable 100 milliGRAM(s) SubCutaneous every 12 hours  famotidine    Tablet 20 milliGRAM(s) Oral every 24 hours  ferrous    sulfate 325 milliGRAM(s) Oral two times a day  gabapentin 100 milliGRAM(s) Oral at bedtime  insulin glargine Injectable (LANTUS) 15 Unit(s) SubCutaneous at bedtime  insulin lispro (HumaLOG) corrective regimen sliding scale   SubCutaneous three times a day before meals  insulin lispro (HumaLOG) corrective regimen sliding scale   SubCutaneous at bedtime  insulin lispro Injectable (HumaLOG) 5 Unit(s) SubCutaneous three times a day with meals  losartan 25 milliGRAM(s) Oral daily  methimazole 10 milliGRAM(s) Oral daily  metoprolol tartrate 50 milliGRAM(s) Oral every 12 hours  multivitamin 1 Tablet(s) Oral daily  pantoprazole    Tablet 40 milliGRAM(s) Oral before breakfast  phenazopyridine 100 milliGRAM(s) Oral every 8 hours  potassium chloride    Tablet ER 10 milliEquivalent(s) Oral daily  senna 2 Tablet(s) Oral at bedtime  sucralfate 1 Gram(s) Oral four times a day  tamsulosin 0.8 milliGRAM(s) Oral at bedtime    MEDICATIONS  (PRN):  acetaminophen   Tablet .. 650 milliGRAM(s) Oral every 6 hours PRN Temp greater or equal to 38C (100.4F), Mild Pain (1 - 3)  bisacodyl Suppository 10 milliGRAM(s) Rectal daily PRN Constipation  dextrose 40% Gel 15 Gram(s) Oral once PRN Blood Glucose LESS THAN 70 milliGRAM(s)/deciliter  glucagon  Injectable 1 milliGRAM(s) IntraMuscular once PRN Glucose LESS THAN 70 milligrams/deciliter  morphine  - Injectable 2 milliGRAM(s) IV Push every 6 hours PRN breakthrough pain  ondansetron Injectable 4 milliGRAM(s) IV Push once PRN Nausea and/or Vomiting  oxyCODONE    IR 5 milliGRAM(s) Oral every 6 hours PRN Severe Pain (7 - 10)  traMADol 25 milliGRAM(s) Oral every 6 hours PRN Moderate Pain (4 - 6)    Allergies  fish (Hives)  No Known Drug Allergies    Vital Signs Last 24 Hrs  T(C): 36.9 (22 Jun 2020 04:39), Max: 36.9 (22 Jun 2020 04:39)  T(F): 98.4 (22 Jun 2020 04:39), Max: 98.4 (22 Jun 2020 04:39)  HR: 56 (22 Jun 2020 04:39) (56 - 64)  BP: 111/75 (22 Jun 2020 04:39) (100/65 - 111/75)  BP(mean): --  RR: 18 (22 Jun 2020 04:39) (17 - 18)  SpO2: 94% (22 Jun 2020 04:39) (94% - 97%)    I&O's Summary    21 Jun 2020 07:01  -  22 Jun 2020 07:00  --------------------------------------------------------  IN: 300 mL / OUT: 2800 mL / NET: -2500 mL    TELE: Not on telemetry   PHYSICAL EXAM:  Appearance: NAD, no distress, alert, Well developed   HEENT: Moist Mucous Membranes, Anicteric  Cardiovascular: Regular rate and rhythm, Normal S1 S2, No JVD, No murmurs, No rubs, gallops or clicks  Respiratory: Non-labored, Clear to auscultation, No rales, No rhonchi, No wheezing.   Gastrointestinal:  Soft, Non-tender, + BS  : + Aguilera orange urine   Neurologic: Non-focal  Skin: Warm and dry, Lt foot DSD, No ecchymosis, No cyanosis  Musculoskeletal: No clubbing, No cyanosis, No joint swelling/tenderness  Psychiatry: Mood & affect appropriate  Lymph: No peripheral edema.     LABS: All Labs Reviewed:                        9.3    6.87  )-----------( 367      ( 22 Jun 2020 09:17 )             30.0                         9.3    8.32  )-----------( 370      ( 21 Jun 2020 07:23 )             30.3                         8.9    6.82  )-----------( 395      ( 20 Jun 2020 08:29 )             28.6     22 Jun 2020 09:13    141    |  107    |  10     ----------------------------<  139    3.6     |  27     |  0.83     Ca    8.6        22 Jun 2020 09:13  Triiodothyronine, Total (T3 Total): 68 ng/dL (06-18-20 @ 11:01)    12 Lead ECG:   Ventricular Rate 61 BPM  Atrial Rate 61 BPM  P-R Interval 146 ms  QRS Duration 82 ms  Q-T Interval 416 ms  QTC Calculation(Bezet) 418 ms  P Axis 42 degrees  R Axis 19 degrees  T Axis 54 degrees  Diagnosis Line Normal sinus rhythm  Normal ECG  When compared with ECG of 16-JUN-2020 11:37,  No significant change was found  Confirmed by DAT CALLAHAN ([id number]) on 6/17/2020 4:51:28 PM (06-17-20 @ 08:18)    < from: TTE Echo Doppler w/o Cont (12.24.19 @ 13:55) >  Dimensions:    LA 4.2       Normal Values: 2.0 - 4.0 cm    Ao 4.0        Normal Values: 2.0 - 3.8 cm  SEPTUM 1.6       Normal Values: 0.6 - 1.2 cm  PWT 1.6       Normal Values: 0.6 - 1.1 cm  LVIDd 5.8         Normal Values: 3.0 - 5.6 cm  LVIDs 3.2         Normal Values: 1.8 - 4.0 cm    Derived Variables:  LVMI g/m2  RWT  Fractional Short  Ejection Fraction    Doppler Peak v. AoV= (m/sec)    OBSERVATIONS:    Mitral Valve: normal, mild MR.  Aortic Valve/Aorta: normal trileaflet aortic valve.  Tricuspid Valve: normal with trace TR.  Pulmonic Valve: normal  Left Atrium: Enlarged  Right Atrium: normal  Left Ventricle: Severe concentric LVH with normal systolic function, estimated LVEF of 65%.  Right Ventricle: normal size and systolic function.  Pericardium/Pleura: no significant pleural effusion, no significant pericardial effusion.  Pulmonary/RV Pressure: Inadequate TR jet to estimate PA systolic pressure  LV Diastolic Function: Grade 2diastolic dysfunction.    Severe concentric LVH, and mild LV enlargement, with normal systolic function, estimated LVEF of 65%. Normal right ventricular size and systolic function. Grade 2diastolic dysfunction. Left atrial enlargement The aortic root is normal in size. The mitral valve is structurally normal, mild MR. The aortic valve is trileaflet without stenosis or insufficiency. Trace physiologic TR. Inadequate TR jet to estimate PA systolic pressure No significant pericardial effusion.        < end of copied text >    < from: Cardiac Cath Lab - Adult (08.02.19 @ 08:55) >  VENTRICLES: No left ventriculogram was performed.  CORONARY VESSELS: The coronary circulation is right dominant.  RCA:   --  Proximal RCA: There was a 95 % stenosis.  COMPLICATIONS: There were no complications.  INTERVENTIONAL IMPRESSIONS: Successful stenting of the RCA with BMS  INTERVENTIONAL RECOMMENDATIONS: Aspirin for 1 week. Plavix for 3 weeks. Eliquis for PE (duration TBD) - limit triple therapy for 1 week then continue with plavix eliquis for 3 weeks. This modified DAPT regimen is due to his recent perforated ulcer and surgery.    < end of copied text >    < from: Cardiac Cath Lab - Adult (08.01.19 @ 14:11) >  (Isoptin, Calan, Covera), 2.5 mg, IA. Heparin, 3000 units, IA.  VENTRICLES: No left ventriculogram was performed.  CORONARY VESSELS: The coronary circulation is right dominant.  LM:   --  LM: Normal.  LAD:   --  Mid LAD: There was a tubular 30 % stenosis.  --  D1: There was a tubular 40 % stenosis.  CX:   --  Circumflex: Angiography showed minor luminal irregularities with no flow limiting lesions.  --  OM1: Angiography showed minor luminal irregularities with no flow limiting lesions.  --  OM2: Angiography showed minor luminal irregularities with no flow limiting lesions.  RCA:   --  Proximal RCA: There was a 95 % stenosis.  --  RPLS: There was a 100 % stenosis.  COMPLICATIONS: There were no complications.  DIAGNOSTIC IMPRESSIONS: Severe RCA disease (95% prox disease, occluded  RPL). Mild LCx and Mild LAD disease.  DIAGNOSTIC RECOMMENDATIONS: Will review with GI/Surgery risk of triple therapy prior to proceeding with PCI    < end of copied text >    < from: Xray Chest 1 View AP/PA (06.16.20 @ 10:04) >  FINDINGS:  Lungs:There are no lung consolidations. Small left pleural effusion.  Heart: The heart is normal in size.  Mediastinum: The mediastinum is within normal limits.    IMPRESSION:  No lung consolidations.  Small left pleural effusion.    < end of copied text >

## 2020-06-22 NOTE — PROGRESS NOTE ADULT - ASSESSMENT
49year old Male seen at [facility] for left foot TMA wound sp amputation with clean margins, readmitted with wound painful and foul smelling.     Revision of transmetatarsal amputation of left foot 16-Jun-2020 6/16-Surgical Path:  1. Bone, metatarsals, excision:  Fragments of bone showing acute osteomyelitis and focal  mild chronic osteomyelitis, Active bone remodeling with new bone formation and focal marrow fibrosis.    2. Bone, metatarsals, clean margin:  Bone, with adjacent soft tissue, showing mild chronic osteomyelitis accompanied  by active bone remodeling with new bone formation and focal marrow fibrosis.

## 2020-06-22 NOTE — PROGRESS NOTE ADULT - ASSESSMENT
IMPRESSION:  49/m with multiple co morbid medical history, including hx of cad, s/p mi, h/o pe, h/o a.fibb, h/o abd surgery for perforated gastric ulcer in past who had left foot TMA surgery around 5/20/2020, was admitted around 6/16 for pain, bleeding and redness at wound site, seen by podiatry and underwent revision left foot TMA 6/16/2020 (estimated 300 ml blood loss per surgery), had 3 units prbc , 1 unit platelets and 1 unit ffp this admission, hb was around 10.5 this admission, getting treatment for possible OMleft foot infection as per id and is on abx as per id. hx a.fibb and cardiology is following    RECOMMENDATION:  Anemia--seems multifactorial, likely from blood loss/post surgery, blood loss from wound site with revision tma 6/16  iron profile--low iron, transferrin saturation, patient is on iv iron as looking for a rapid response, day 2 (2/2) today, po iron out patient  hb is at 9.3 today, hb was at 9.3 yesterday--h/h low but stable  no evidence of hemolysis, negative fobt  monitor h/h--transfuse prbc as needed for symptomatic anemia  h/o pe   hx of a.fibb--is on lovenox  case d/w pt at bedside and is in agreement

## 2020-06-22 NOTE — PROGRESS NOTE ADULT - SUBJECTIVE AND OBJECTIVE BOX
CAPILLARY BLOOD GLUCOSE      POCT Blood Glucose.: 157 mg/dL (22 Jun 2020 08:21)  POCT Blood Glucose.: 220 mg/dL (21 Jun 2020 21:07)  POCT Blood Glucose.: 216 mg/dL (21 Jun 2020 17:18)  POCT Blood Glucose.: 332 mg/dL (21 Jun 2020 11:52)      Vital Signs Last 24 Hrs  T(C): 36.9 (22 Jun 2020 04:39), Max: 36.9 (22 Jun 2020 04:39)  T(F): 98.4 (22 Jun 2020 04:39), Max: 98.4 (22 Jun 2020 04:39)  HR: 56 (22 Jun 2020 04:39) (56 - 64)  BP: 111/75 (22 Jun 2020 04:39) (100/65 - 111/75)  BP(mean): --  RR: 18 (22 Jun 2020 04:39) (17 - 18)  SpO2: 94% (22 Jun 2020 04:39) (94% - 97%)    General: WN/WD NAD  Respiratory: CTA B/L  CV: RRR, S1S2, no murmurs, rubs or gallops  Abdominal: Soft, NT, ND +BS, Last BM  Extremities: le foot dsg intact             atorvastatin 40 milliGRAM(s) Oral at bedtime  dextrose 40% Gel 15 Gram(s) Oral once PRN  dextrose 50% Injectable 12.5 Gram(s) IV Push once  dextrose 50% Injectable 25 Gram(s) IV Push once  dextrose 50% Injectable 25 Gram(s) IV Push once  glucagon  Injectable 1 milliGRAM(s) IntraMuscular once PRN  insulin glargine Injectable (LANTUS) 15 Unit(s) SubCutaneous at bedtime  insulin lispro (HumaLOG) corrective regimen sliding scale   SubCutaneous three times a day before meals  insulin lispro (HumaLOG) corrective regimen sliding scale   SubCutaneous at bedtime  insulin lispro Injectable (HumaLOG) 5 Unit(s) SubCutaneous three times a day with meals  methimazole 10 milliGRAM(s) Oral daily

## 2020-06-22 NOTE — PROVIDER CONTACT NOTE (CRITICAL VALUE NOTIFICATION) - TEST AND RESULT REPORTED:
lactate 3.7
Positive tissue from Left metatarsal bone - preliminary result - rare staphylococcus aureus, rare enterococcus avium and rare enterococcus faecalis
positive blood cultures from 6/16 gram positive cocci in clusters

## 2020-06-22 NOTE — PROGRESS NOTE ADULT - ASSESSMENT
49M PMH CAD s/p BMS to RCA (8/2019), RUL subsegmental PE (6/2019, on Eliquis), hx of NSTEMI and PAF s/p ex-lap omentopexy and plication for perforated antral ulcer (5/2019),  CVA s/p tPA (8/2019), HTN and DM2, osteomyelitis, s/p debridement, s/p left TMA 5/2020, without complication p/w bleeding from wound. In the ER his BP was initially marginal.  He dropped his BP to 90/40, and had two episodes of bleeding from the wound, which was wrapped with a pressure dressing, he then developed chest pain. - Currently CP free.      CAD  - There was initially no evidence of acute ischemia or overload   - Patient with known CAD, has BMS to prox RCA 8/2019, and an occluded RPLs not intervened upon. Pt known severe LVH with normal LVEF   - Echo 12/24 as delineated above revealing severe LVH, normal systolic function ef 65% grade 2 diastolic dysfunction, mild MR, trace TR   - Currently off of all antiplatelets  - Resume ASA when able given PCI.   - Continue Imdur, losartan, metoprolol  - Continue statin	  - Monitor for volume overload     Afibrillation   - Continue Lovenox 100mg Q12hrs   - Continue BB    Hypertension  - BP: 111/75 (06-22-20 @ 04:39) (100/65 - 111/75)  - Continue Imdur, losartan, metoprolol  - Monitor routine hemodynamics     Anemia   - Hemoglobin: 9.3 (06-22-20 @ 09:17); Hematocrit: 30.0 (06-22-20 @ 09:17)  - Per primary   - Transfuse prn with PRBC to keep Hb >8 given CAD    Osteomyelitis Foot  - Per primary/podiatry   - Follow ID recommendations.     - Monitor and replete lytes, keep K>4, Mg>2.  - All other medical needs as per primary team.  - Other cardiovascular workup will depend on clinical course.  - Will continue to follow.      Danny Arce, MS FNP, [facility]  Nurse Practitioner- Cardiology   Spectra #[phone]

## 2020-06-22 NOTE — PROGRESS NOTE ADULT - SUBJECTIVE AND OBJECTIVE BOX
49 y.o Male seen during AM podiatry rounds with Dr. Todd, s/p left foot TMA revision (DOS: 6/16/20). Patient relates no overnight events and states that they are doing well at this time. Pt relates that his pain is now under control. Denies any fever, chills, nausea, vomiting, chest pain, shortness of breath, or calf pain at this time.    Vital Signs Last 24 Hrs  T(C): 36.9 (22 Jun 2020 04:39), Max: 36.9 (22 Jun 2020 04:39)  T(F): 98.4 (22 Jun 2020 04:39), Max: 98.4 (22 Jun 2020 04:39)  HR: 56 (22 Jun 2020 04:39) (56 - 64)  BP: 111/75 (22 Jun 2020 04:39) (100/65 - 111/75)  BP(mean): --  RR: 18 (22 Jun 2020 04:39) (17 - 18)  SpO2: 94% (22 Jun 2020 04:39) (94% - 97%)    PHYSICAL EXAM:  Vascular: DP & PT palpable, Digital hair absent. erythema and edema noted to the dorsal foot.  Dermatological: Incision site of the left foot noted with intact sutures to all but previously removed sutures from dorsal aspect, mild winston-incision erythema, no proximal streaking, no fluctuance, no malodor, + purulent drainage at dorsomedial incision. on dressing.  Neurological: Light touch sensation intact bilaterally  Musculoskeletal: s/p left TMA w/achilles tenotomy. POP with expression of drainage    Culture - Blood (06.19.20 @ 16:36)    Specimen Source: .Blood Blood-Venous    Culture Results:   No growth to date.    Culture - Blood (06.19.20 @ 16:34)    Specimen Source: .Blood Blood-Peripheral    Culture Results:   No growth to date.    Culture - Blood (06.18.20 @ 16:40)    Specimen Source: .Blood Blood    Culture Results:   No growth to date.    Culture - Blood (06.18.20 @ 16:37)    Specimen Source: .Blood Blood    Culture Results:   No growth to date.      Culture - Urine (06.17.20 @ 18:51)    -  Tobramycin: S <=4    -  Tobramycin: S <=4    -  Gentamicin: S <=4    -  Gentamicin: I 8    -  Imipenem: S 2    -  Imipenem: S 2    -  Levofloxacin: R >4    -  Levofloxacin: R >4    -  Meropenem: S <=1    -  Meropenem: S <=1    -  Piperacillin/Tazobactam: S <=16    -  Piperacillin/Tazobactam: S <=16    -  Amikacin: S <=16    -  Amikacin: I 32    -  Aztreonam: S <=4    -  Aztreonam: S 8    -  Cefepime: S <=4    -  Cefepime: S 8    -  Ceftazidime: S 4    -  Ceftazidime: S 4    -  Ciprofloxacin: R >2    -  Ciprofloxacin: R >2    Specimen Source: .Urine Catheterized    Culture Results:   50,000 - 99,000 CFU/mL Pseudomonas aeruginosa  50,000 - 99,000 CFU/mL Pseudomonas aeruginosa #2  Multiple Morphological Strains    Organism Identification: Pseudomonas aeruginosa  Pseudomonas aeruginosa    Organism: Pseudomonas aeruginosa    Organism: Pseudomonas aeruginosa    Method Type: BAYLEE    Method Type: BAYLEE    Culture - Blood (06.16.20 @ 16:36)    Gram Stain:   Growth in anaerobic bottle: Gram Positive Cocci in Clusters    -  Multidrug (KPC pos) resistant organism: Nondet    -  Staphylococcus aureus: Nondet    -  Methicillin resistant Staphylococcus aureus (MRSA): Nondet    -  Coagulase negative Staphylococcus: Nondet    -  Enterococcus species: Nondet    -  Vancomycin resistant Enterococcus sp.: Nondet    -  Escherichia coli: Nondet    -  Klebsiella oxytoca: Nondet    -  Klebsiella pneumoniae: Nondet    -  Serratia marcescens: Nondet    -  Haemophilus influenzae: Nondet    -  Listeria monocytogenes: Nondet    -  Neisseria meningitidis: Nondet    -  Pseudomonas aeruginosa: Nondet    -  Acinetobacter baumanii: Nondet    -  Enterobacter cloacae complex: Nondet    -  Streptococcus sp. (Not Grp A, B or S pneumoniae): Nondet    -  Streptococcus agalactiae (Group B): Nondet    -  Streptococcus pyogenes (Group A): Nondet    -  Streptococcus pneumoniae: Nondet    -  Candida albicans: Nondet    -  Candida glabrata: Nondet    -  Candida krusei: Nondet    -  Candida parapsilosis: Nondet    -  Candida tropicalis: Nondet    Specimen Source: .Blood Blood-Peripheral    Organism: Blood Culture PCR    Culture Results:   Growth in anaerobic bottle: Gram Positive Cocci in Clusters  "Due to technical problems, Proteus sp. will Not be reported as part of  the BCID panel until further notice"  ***Blood Panel PCR results on this specimen are available  approximately 3 hours after the Gram stain result.***  Gram stain, PCR, and/or culture results may not always  correspond due to difference in methodologies.  ************************************************************  This PCR assay was performed using [employer].  The following targets are tested for: Enterococcus,  vancomycin resistant enterococci, Listeria monocytogenes,  coagulase negative staphylococci, S. aureus,  methicillin resistant S. aureus, Streptococcus agalactiae  (Group B), S. pneumoniae, S. pyogenes (Group A),  Acinetobacter baumannii, Enterobacter cloacae, E. coli,  Klebsiella oxytoca, K. pneumoniae, Proteus sp.,  Serratia marcescens, Haemophilus influenzae,  Neisseria meningitidis, Pseudomonas aeruginosa, Candida  albicans, C. glabrata, C krusei, C parapsilosis,  C. tropicalis and the KPC resistance gene.    Organism Identification: Blood Culture PCR    Method Type: PCR    Culture - Tissue with Gram Stain (06.17.20 @ 01:59)    -  Ampicillin: S <=2 Predicts results to ampicillin/sulbactam, amoxacillin-clavulanate and  piperacillin-tazobactam.    -  Ampicillin: S <=2 Predicts results to ampicillin/sulbactam, amoxacillin-clavulanate and  piperacillin-tazobactam.    -  Ampicillin/Sulbactam: S <=8/4    Gram Stain:   Rare polymorphonuclear leukocytes seen per low power field  No organisms seen per oil power field    -  Clindamycin: S <=0.25    -  Erythromycin: S <=0.25    -  Cefazolin: S <=4    -  Trimethoprim/Sulfamethoxazole: S <=0.5/9.5    -  Vancomycin: S 2    -  Vancomycin: S 0.5  Surgical Pathology Report (06.16.20 @ 20:37)    Surgical Pathology Report:   ACCESSION No:  [id number]    SARAH MORRISON                     2        Surgical Final Report          Final Diagnosis    1. Bone, metatarsals, excision:  Fragments of bone showing acute osteomyelitis and focal  mild chronic  osteomyelitis.  Active bone remodeling with new bone formation and focal  marrow fibrosis.    2. Bone, metatarsals, clean margin:  Bone, with adjacent soft tissue, showing mild chronic  osteomyelitis accompanied  by active bone remodeling with new bone formation and focal  marrow  fibrosis.    Verified by: MARIA A SERRATO  (Electronic Signature)  Reported on: 06/19/20 13:05 EDT, [facility], [address] [place] [address], [place] [address]  Phone: [phone]   Fax: [phone]  _________________________________________________________________    Clinical History  Left foot stump osteomyelitis  Procedure: Left TMA site revision    Specimen(s) Submitted  1-Left metatarsals bone  2-Left metatarsals bone, clean margin    Gross Description  1. The specimen is received in formalin and the specimen  container is labeled: Left metatarsals bone pathology. It  consists of four ovoid pieces of tan-yellow bony tissue with  smooth surgical cuts and attached gray-tan soft tissue measuring  in aggregate 3.0 x 2.5x 0.7 cm. Each piece is sectioned. The  specimen is entirely submitted following decalcification. Four  cassettes (one piece per cassette).    2. The specimen is received in formalin and the specimen  container is labeled: Left metatarsals bone cleanmargin  pathology. It consists of three pieces of tan-yellow bony tissue  with smooth surgical cuts measuring in aggregate 2.0 x 1.7 x 0.2  cm. Entirely submitted following decalcification. One cassette.                SARAH MORRISON      2        Surgical Final Report          In addition to other data that may appear on the specimen  containers, all labels have been inspected to confirm the  presence of the patient's name and date of birth.  MELISSA Crane ([facility]) 6/18/2020 12:18 PM    Perioperative Diagnosis  Left foot stump osteomyelitis    Postoperative Diagnosis  Same      -  Vancomycin: S 2    -  RIF- Rifampin: S <=1 Should not be used as monotherapy    -  Tetra/Doxy: S <=1    -  Tetra/Doxy: R >8    -  Tetra/Doxy: R >8    -  Penicillin: R >8    -  Oxacillin: S 0.5    -  Gentamicin: S <=1 Should not be used as monotherapy    Specimen Source: .Tissue Other, left metatarsal bone    Culture Results:   Rare Staphylococcus aureus  Rare Enterococcus avium  Rare Enterococcus faecalis    Organism Identification: Staphylococcus aureus  Enterococcus avium  Enterococcus faecalis    Organism: Enterococcus avium    Organism: Enterococcus faecalis    Organism: Staphylococcus aureus    Method Type: BAYLEE    Method Type: BAYLEE    Method Type: BAYLEE                  Imaging: left foot postop radiographs reveal s/p TMA

## 2020-06-23 LAB
CULTURE RESULTS: SIGNIFICANT CHANGE UP
CULTURE RESULTS: SIGNIFICANT CHANGE UP
HCT VFR BLD CALC: 32.2 % — LOW (ref 39–50)
HGB BLD-MCNC: 9.9 G/DL — LOW (ref 13–17)
MCHC RBC-ENTMCNC: 28.7 PG — SIGNIFICANT CHANGE UP (ref 27–34)
MCHC RBC-ENTMCNC: 30.7 GM/DL — LOW (ref 32–36)
MCV RBC AUTO: 93.3 FL — SIGNIFICANT CHANGE UP (ref 80–100)
NRBC # BLD: 0 /100 WBCS — SIGNIFICANT CHANGE UP (ref 0–0)
PLATELET # BLD AUTO: 370 K/UL — SIGNIFICANT CHANGE UP (ref 150–400)
RBC # BLD: 3.45 M/UL — LOW (ref 4.2–5.8)
RBC # FLD: 17.1 % — HIGH (ref 10.3–14.5)
SPECIMEN SOURCE: SIGNIFICANT CHANGE UP
SPECIMEN SOURCE: SIGNIFICANT CHANGE UP
T4 SERPL-MCNC: 5.5 UG/DL — SIGNIFICANT CHANGE UP
WBC # BLD: 6.14 K/UL — SIGNIFICANT CHANGE UP (ref 3.8–10.5)
WBC # FLD AUTO: 6.14 K/UL — SIGNIFICANT CHANGE UP (ref 3.8–10.5)

## 2020-06-23 PROCEDURE — 99024 POSTOP FOLLOW-UP VISIT: CPT

## 2020-06-23 PROCEDURE — 99232 SBSQ HOSP IP/OBS MODERATE 35: CPT

## 2020-06-23 RX ADMIN — Medication 5 UNIT(S): at 08:23

## 2020-06-23 RX ADMIN — TAMSULOSIN HYDROCHLORIDE 0.8 MILLIGRAM(S): 0.4 CAPSULE ORAL at 21:20

## 2020-06-23 RX ADMIN — Medication 1 TABLET(S): at 18:25

## 2020-06-23 RX ADMIN — Medication 10 MILLIEQUIVALENT(S): at 13:20

## 2020-06-23 RX ADMIN — Medication 4: at 12:13

## 2020-06-23 RX ADMIN — Medication 325 MILLIGRAM(S): at 18:24

## 2020-06-23 RX ADMIN — Medication 25 MILLIGRAM(S): at 05:26

## 2020-06-23 RX ADMIN — PANTOPRAZOLE SODIUM 40 MILLIGRAM(S): 20 TABLET, DELAYED RELEASE ORAL at 05:27

## 2020-06-23 RX ADMIN — Medication 25 MILLIGRAM(S): at 21:21

## 2020-06-23 RX ADMIN — Medication 25 MILLIGRAM(S): at 13:21

## 2020-06-23 RX ADMIN — AMPICILLIN SODIUM AND SULBACTAM SODIUM 200 GRAM(S): 250; 125 INJECTION, POWDER, FOR SUSPENSION INTRAMUSCULAR; INTRAVENOUS at 00:30

## 2020-06-23 RX ADMIN — Medication 100 MILLIGRAM(S): at 13:20

## 2020-06-23 RX ADMIN — Medication 1 GRAM(S): at 05:27

## 2020-06-23 RX ADMIN — Medication 1 TABLET(S): at 13:20

## 2020-06-23 RX ADMIN — MORPHINE SULFATE 2 MILLIGRAM(S): 50 CAPSULE, EXTENDED RELEASE ORAL at 22:33

## 2020-06-23 RX ADMIN — Medication 1 GRAM(S): at 18:24

## 2020-06-23 RX ADMIN — Medication 325 MILLIGRAM(S): at 05:26

## 2020-06-23 RX ADMIN — OXYCODONE HYDROCHLORIDE 5 MILLIGRAM(S): 5 TABLET ORAL at 21:18

## 2020-06-23 RX ADMIN — ATORVASTATIN CALCIUM 40 MILLIGRAM(S): 80 TABLET, FILM COATED ORAL at 21:20

## 2020-06-23 RX ADMIN — INSULIN GLARGINE 15 UNIT(S): 100 INJECTION, SOLUTION SUBCUTANEOUS at 21:22

## 2020-06-23 RX ADMIN — Medication 1000 MILLIGRAM(S): at 13:21

## 2020-06-23 RX ADMIN — Medication 2: at 08:23

## 2020-06-23 RX ADMIN — Medication 1 GRAM(S): at 13:20

## 2020-06-23 RX ADMIN — GABAPENTIN 100 MILLIGRAM(S): 400 CAPSULE ORAL at 21:19

## 2020-06-23 RX ADMIN — Medication 5 UNIT(S): at 12:13

## 2020-06-23 RX ADMIN — Medication 1 GRAM(S): at 00:19

## 2020-06-23 RX ADMIN — FAMOTIDINE 20 MILLIGRAM(S): 10 INJECTION INTRAVENOUS at 05:26

## 2020-06-23 RX ADMIN — AMPICILLIN SODIUM AND SULBACTAM SODIUM 200 GRAM(S): 250; 125 INJECTION, POWDER, FOR SUSPENSION INTRAMUSCULAR; INTRAVENOUS at 05:26

## 2020-06-23 RX ADMIN — ENOXAPARIN SODIUM 100 MILLIGRAM(S): 100 INJECTION SUBCUTANEOUS at 05:26

## 2020-06-23 RX ADMIN — ENOXAPARIN SODIUM 100 MILLIGRAM(S): 100 INJECTION SUBCUTANEOUS at 18:25

## 2020-06-23 RX ADMIN — Medication 1 GRAM(S): at 21:19

## 2020-06-23 RX ADMIN — SENNA PLUS 2 TABLET(S): 8.6 TABLET ORAL at 21:19

## 2020-06-23 RX ADMIN — Medication 100 MILLIGRAM(S): at 21:20

## 2020-06-23 RX ADMIN — Medication 5 UNIT(S): at 17:24

## 2020-06-23 RX ADMIN — LOSARTAN POTASSIUM 25 MILLIGRAM(S): 100 TABLET, FILM COATED ORAL at 05:26

## 2020-06-23 NOTE — PROGRESS NOTE ADULT - SUBJECTIVE AND OBJECTIVE BOX
Patient is a 49y old  Male who presents with a chief complaint of left foot infection/bleeding (21 Jun 2020 13:34)      INTERVAL /OVERNIGHT EVENTS: No events over night.      T(C): 36.2 (06-23-20 @ 04:54), Max: 36.2 (06-23-20 @ 04:54)  HR: 51 (06-23-20 @ 04:54) (51 - 51)  BP: 112/71 (06-23-20 @ 04:54) (112/71 - 112/71)  RR: 18 (06-23-20 @ 04:54) (18 - 18)  SpO2: 98% (06-23-20 @ 04:54) (98% - 98%)      Allergies    fish (Hives)  No Known Drug Allergies    Intolerances        REVIEW OF SYSTEMS:  CONSTITUTIONAL: No fever, weight loss, or fatigue  EYES: No eye pain, visual disturbances, or discharge  ENMT:  No difficulty hearing, tinnitus, vertigo; No sinus or throat pain  NECK: No pain or stiffness  RESPIRATORY: No cough, wheezing, chills or hemoptysis; No shortness of breath  CARDIOVASCULAR: No chest pain, palpitations, dizziness, or leg swelling  GASTROINTESTINAL: No abdominal or epigastric pain. No nausea, vomiting, or hematemesis; No diarrhea or constipation. No melena or hematochezia.  GENITOURINARY: No dysuria, frequency, hematuria, or incontinence  NEUROLOGICAL: No headaches, memory loss, loss of strength, numbness, or tremors  SKIN: No itching, burning, rashes, or lesions   LYMPH NODES: No enlarged glands  ENDOCRINE: No heat or cold intolerance; No hair loss; No polydipsia or polyuria  MUSCULOSKELETAL: No joint pain or swelling; No muscle, back, or extremity pain  PSYCHIATRIC: No depression, anxiety, mood swings, or difficulty sleeping  HEME/LYMPH: No easy bruising, or bleeding gums  ALLERGY AND IMMUNOLOGIC: No hives or eczema      PHYSICAL EXAM:  GENERAL: NAD, well-groomed, well-developed  HEAD:  Atraumatic, Normocephalic  EYES: EOMI, conjunctiva and sclera clear  NECK: Supple, No JVD, Normal thyroid  NERVOUS SYSTEM:  Alert & Oriented X3, Good concentration; Motor Strength 5/5 B/L upper and lower extremities; DTRs 2+ intact and symmetric  CHEST/LUNG: Clear to auscultation bilaterally; No rales, rhonchi, wheezing, or rubs  HEART: Regular rate and rhythm; No murmurs, rubs, or gallops  ABDOMEN: Soft, Nontender, Nondistended; Bowel sounds present  EXTREMITIES:  Left foot under dressing   SKIN: No rashes or lesions                                     9.9    6.14  )-----------( 370      ( 23 Jun 2020 09:22 )             32.2                       141|107|10<139  3.6|27|0.83  8.6,--,--  06-22 @ 09:13  CAPILLARY BLOOD GLUCOSE      POCT Blood Glucose.: 156 mg/dL (22 Jun 2020 13:21)  POCT Blood Glucose.: 166 mg/dL (22 Jun 2020 12:07)  POCT Blood Glucose.: 157 mg/dL (22 Jun 2020 08:21)  POCT Blood Glucose.: 220 mg/dL (21 Jun 2020 21:07)  POCT Blood Glucose.: 216 mg/dL (21 Jun 2020 17:18)    RADIOLOGY & ADDITIONAL TESTS:    Notes Reviewed:  [x ] YES  [ ] NO    Care Discussed with Consultants/Other Providers [x ] YES  [ ] NO

## 2020-06-23 NOTE — PROGRESS NOTE ADULT - PROBLEM SELECTOR PROBLEM 3
Afib
Diabetes
Diabetes
Positive blood culture

## 2020-06-23 NOTE — PROGRESS NOTE ADULT - PROBLEM SELECTOR PLAN 1
s/p debridement and dressing per wound care, Podiatry  ID following. OR cultures show MSSA, micrococcus and enterococcus.  Bone, metatarsals, clean margin:  Bone, with adjacent soft tissue, showing mild chronic osteomyelitis accompanied  by active bone remodeling   ID follow up appreciated, Augmentin till 07/14

## 2020-06-23 NOTE — PROGRESS NOTE ADULT - SUBJECTIVE AND OBJECTIVE BOX
Patient is a 49y old  Male who presents with a chief complaint of left foot infection/bleeding (23 Jun 2020 09:20)       Pt is seen and examined  pt is awake and lying in bed/out of bed to chair  pt seems comfortable and denies any complaints at this time    HPI:  49year old Male seen at [facility] ED for left foot TMA wound. Pt was seen in wound care by Dr. Hale today for his left foot TMA (DOS 5.20.2020) Pt relates he went to [facility] last week for the wound and states that the wound was packed and redressed. Pt presented today and wound was painful and foul smelling.     Denies any fever, chills, nausea, vomiting, chest pain, shortness of breath, or calf pain at this time. (16 Jun 2020 12:13)         ROS:  Negative except for:    MEDICATIONS  (STANDING):  ampicillin/sulbactam  IVPB 3 Gram(s) IV Intermittent every 6 hours  ascorbic acid 1000 milliGRAM(s) Oral daily  atorvastatin 40 milliGRAM(s) Oral at bedtime  bethanechol 25 milliGRAM(s) Oral three times a day  dextrose 5%. 1000 milliLiter(s) (50 mL/Hr) IV Continuous <Continuous>  dextrose 50% Injectable 12.5 Gram(s) IV Push once  dextrose 50% Injectable 25 Gram(s) IV Push once  dextrose 50% Injectable 25 Gram(s) IV Push once  enoxaparin Injectable 100 milliGRAM(s) SubCutaneous every 12 hours  famotidine    Tablet 20 milliGRAM(s) Oral every 24 hours  ferrous    sulfate 325 milliGRAM(s) Oral two times a day  gabapentin 100 milliGRAM(s) Oral at bedtime  insulin glargine Injectable (LANTUS) 15 Unit(s) SubCutaneous at bedtime  insulin lispro (HumaLOG) corrective regimen sliding scale   SubCutaneous three times a day before meals  insulin lispro (HumaLOG) corrective regimen sliding scale   SubCutaneous at bedtime  insulin lispro Injectable (HumaLOG) 5 Unit(s) SubCutaneous three times a day with meals  losartan 25 milliGRAM(s) Oral daily  methimazole 10 milliGRAM(s) Oral daily  metoprolol tartrate 50 milliGRAM(s) Oral every 12 hours  multivitamin 1 Tablet(s) Oral daily  pantoprazole    Tablet 40 milliGRAM(s) Oral before breakfast  phenazopyridine 100 milliGRAM(s) Oral every 8 hours  potassium chloride    Tablet ER 10 milliEquivalent(s) Oral daily  senna 2 Tablet(s) Oral at bedtime  sucralfate 1 Gram(s) Oral four times a day  tamsulosin 0.8 milliGRAM(s) Oral at bedtime    MEDICATIONS  (PRN):  acetaminophen   Tablet .. 650 milliGRAM(s) Oral every 6 hours PRN Temp greater or equal to 38C (100.4F), Mild Pain (1 - 3)  bisacodyl Suppository 10 milliGRAM(s) Rectal daily PRN Constipation  dextrose 40% Gel 15 Gram(s) Oral once PRN Blood Glucose LESS THAN 70 milliGRAM(s)/deciliter  glucagon  Injectable 1 milliGRAM(s) IntraMuscular once PRN Glucose LESS THAN 70 milligrams/deciliter  morphine  - Injectable 2 milliGRAM(s) IV Push every 6 hours PRN breakthrough pain  ondansetron Injectable 4 milliGRAM(s) IV Push once PRN Nausea and/or Vomiting  oxyCODONE    IR 5 milliGRAM(s) Oral every 6 hours PRN Severe Pain (7 - 10)  traMADol 25 milliGRAM(s) Oral every 6 hours PRN Moderate Pain (4 - 6)      Allergies    fish (Hives)  No Known Drug Allergies    Intolerances        Vital Signs Last 24 Hrs  T(C): 36.2 (23 Jun 2020 04:54), Max: 36.7 (22 Jun 2020 20:28)  T(F): 97.2 (23 Jun 2020 04:54), Max: 98.1 (22 Jun 2020 20:28)  HR: 51 (23 Jun 2020 04:54) (51 - 71)  BP: 112/71 (23 Jun 2020 04:54) (112/71 - 131/84)  BP(mean): --  RR: 18 (23 Jun 2020 04:54) (17 - 18)  SpO2: 98% (23 Jun 2020 04:54) (98% - 98%)    PHYSICAL EXAM  General: adult in NAD  HEENT: clear oropharynx, anicteric sclera, pink conjunctiva  Neck: supple  CV: normal S1/S2 with no murmur rubs or gallops  Lungs: positive air movement b/l ant lungs,clear to auscultation, no wheezes, no rales  Abdomen: soft non-tender non-distended, no hepatosplenomegaly  Ext: no clubbing cyanosis or edema  Skin: no rashes and no petechiae  Neuro: alert and oriented X 4, no focal deficits  LABS:                          9.9    6.14  )-----------( 370      ( 23 Jun 2020 09:22 )             32.2         Mean Cell Volume : 93.3 fl  Mean Cell Hemoglobin : 28.7 pg  Mean Cell Hemoglobin Concentration : 30.7 gm/dL  Auto Neutrophil # : x  Auto Lymphocyte # : x  Auto Monocyte # : x  Auto Eosinophil # : x  Auto Basophil # : x  Auto Neutrophil % : x  Auto Lymphocyte % : x  Auto Monocyte % : x  Auto Eosinophil % : x  Auto Basophil % : x    Serial CBC's  06-23 @ 09:22  Hct-32.2 / Hgb-9.9 / Plat-370 / RBC-3.45 / WBC-6.14          Serial CBC's  06-22 @ 09:17  Hct-30.0 / Hgb-9.3 / Plat-367 / RBC-3.22 / WBC-6.87          Serial CBC's  06-21 @ 07:23  Hct-30.3 / Hgb-9.3 / Plat-370 / RBC-3.31 / WBC-8.32          Serial CBC's  06-20 @ 08:29  Hct-28.6 / Hgb-8.9 / Plat-395 / RBC-3.15 / WBC-6.82          Serial CBC's  06-19 @ 17:10  Hct-26.6 / Hgb-8.5 / Plat--- / RBC--- / WBC---            06-22    141  |  107  |  10  ----------------------------<  139<H>  3.6   |  27  |  0.83    Ca    8.6      22 Jun 2020 09:13            Ferritin, Serum: 86 ng/mL (06-19-20 @ 01:18)  Vitamin B12, Serum: 342 pg/mL (06-19-20 @ 01:18)  Folate, Serum: 13.8 ng/mL (06-19-20 @ 01:18)  Iron - Total Binding Capacity.: 201 ug/dL (06-19-20 @ 01:13)  Reticulocyte Percent: 3.9 % (06-18-20 @ 16:16)                BLOOD SMEAR INTERPRETATION:       RADIOLOGY & ADDITIONAL STUDIES: Patient is a 49y old  Male who presents with a chief complaint of left foot infection/bleeding (23 Jun 2020 09:20)       Pt is seen and examined  pt is awake and lying in bed  pt seems comfortable and denies any complaints at this time    HPI:  49year old Male seen at [facility] ED for left foot TMA wound. Pt was seen in wound care by Dr. Hale today for his left foot TMA (DOS 5.20.2020) Pt relates he went to [facility] last week for the wound and states that the wound was packed and redressed. Pt presented today and wound was painful and foul smelling.     Denies any fever, chills, nausea, vomiting, chest pain, shortness of breath, or calf pain at this time. (16 Jun 2020 12:13)         ROS:  left foot in bandage    MEDICATIONS  (STANDING):  ampicillin/sulbactam  IVPB 3 Gram(s) IV Intermittent every 6 hours  ascorbic acid 1000 milliGRAM(s) Oral daily  atorvastatin 40 milliGRAM(s) Oral at bedtime  bethanechol 25 milliGRAM(s) Oral three times a day  dextrose 5%. 1000 milliLiter(s) (50 mL/Hr) IV Continuous <Continuous>  dextrose 50% Injectable 12.5 Gram(s) IV Push once  dextrose 50% Injectable 25 Gram(s) IV Push once  dextrose 50% Injectable 25 Gram(s) IV Push once  enoxaparin Injectable 100 milliGRAM(s) SubCutaneous every 12 hours  famotidine    Tablet 20 milliGRAM(s) Oral every 24 hours  ferrous    sulfate 325 milliGRAM(s) Oral two times a day  gabapentin 100 milliGRAM(s) Oral at bedtime  insulin glargine Injectable (LANTUS) 15 Unit(s) SubCutaneous at bedtime  insulin lispro (HumaLOG) corrective regimen sliding scale   SubCutaneous three times a day before meals  insulin lispro (HumaLOG) corrective regimen sliding scale   SubCutaneous at bedtime  insulin lispro Injectable (HumaLOG) 5 Unit(s) SubCutaneous three times a day with meals  losartan 25 milliGRAM(s) Oral daily  methimazole 10 milliGRAM(s) Oral daily  metoprolol tartrate 50 milliGRAM(s) Oral every 12 hours  multivitamin 1 Tablet(s) Oral daily  pantoprazole    Tablet 40 milliGRAM(s) Oral before breakfast  phenazopyridine 100 milliGRAM(s) Oral every 8 hours  potassium chloride    Tablet ER 10 milliEquivalent(s) Oral daily  senna 2 Tablet(s) Oral at bedtime  sucralfate 1 Gram(s) Oral four times a day  tamsulosin 0.8 milliGRAM(s) Oral at bedtime    MEDICATIONS  (PRN):  acetaminophen   Tablet .. 650 milliGRAM(s) Oral every 6 hours PRN Temp greater or equal to 38C (100.4F), Mild Pain (1 - 3)  bisacodyl Suppository 10 milliGRAM(s) Rectal daily PRN Constipation  dextrose 40% Gel 15 Gram(s) Oral once PRN Blood Glucose LESS THAN 70 milliGRAM(s)/deciliter  glucagon  Injectable 1 milliGRAM(s) IntraMuscular once PRN Glucose LESS THAN 70 milligrams/deciliter  morphine  - Injectable 2 milliGRAM(s) IV Push every 6 hours PRN breakthrough pain  ondansetron Injectable 4 milliGRAM(s) IV Push once PRN Nausea and/or Vomiting  oxyCODONE    IR 5 milliGRAM(s) Oral every 6 hours PRN Severe Pain (7 - 10)  traMADol 25 milliGRAM(s) Oral every 6 hours PRN Moderate Pain (4 - 6)      Allergies    fish (Hives)  No Known Drug Allergies    Intolerances        Vital Signs Last 24 Hrs  T(C): 36.2 (23 Jun 2020 04:54), Max: 36.7 (22 Jun 2020 20:28)  T(F): 97.2 (23 Jun 2020 04:54), Max: 98.1 (22 Jun 2020 20:28)  HR: 51 (23 Jun 2020 04:54) (51 - 71)  BP: 112/71 (23 Jun 2020 04:54) (112/71 - 131/84)  BP(mean): --  RR: 18 (23 Jun 2020 04:54) (17 - 18)  SpO2: 98% (23 Jun 2020 04:54) (98% - 98%)    PHYSICAL EXAM  General: adult in NAD  HEENT: clear oropharynx, anicteric sclera, pink conjunctiva  Neck: supple  CV: normal S1/S2 with no murmur rubs or gallops  Lungs: positive air movement b/l ant lungs,clear to auscultation, no wheezes, no rales  Abdomen: soft non-tender non-distended, no hepatosplenomegaly  Ext: no clubbing cyanosis or edema  Skin: no rashes and no petechiae  Neuro: alert and oriented X 4, no focal deficits  LABS:                          9.9    6.14  )-----------( 370      ( 23 Jun 2020 09:22 )             32.2         Mean Cell Volume : 93.3 fl  Mean Cell Hemoglobin : 28.7 pg  Mean Cell Hemoglobin Concentration : 30.7 gm/dL  Auto Neutrophil # : x  Auto Lymphocyte # : x  Auto Monocyte # : x  Auto Eosinophil # : x  Auto Basophil # : x  Auto Neutrophil % : x  Auto Lymphocyte % : x  Auto Monocyte % : x  Auto Eosinophil % : x  Auto Basophil % : x    Serial CBC's  06-23 @ 09:22  Hct-32.2 / Hgb-9.9 / Plat-370 / RBC-3.45 / WBC-6.14          Serial CBC's  06-22 @ 09:17  Hct-30.0 / Hgb-9.3 / Plat-367 / RBC-3.22 / WBC-6.87          Serial CBC's  06-21 @ 07:23  Hct-30.3 / Hgb-9.3 / Plat-370 / RBC-3.31 / WBC-8.32          Serial CBC's  06-20 @ 08:29  Hct-28.6 / Hgb-8.9 / Plat-395 / RBC-3.15 / WBC-6.82          Serial CBC's  06-19 @ 17:10  Hct-26.6 / Hgb-8.5 / Plat--- / RBC--- / WBC---            06-22    141  |  107  |  10  ----------------------------<  139<H>  3.6   |  27  |  0.83    Ca    8.6      22 Jun 2020 09:13            Ferritin, Serum: 86 ng/mL (06-19-20 @ 01:18)  Vitamin B12, Serum: 342 pg/mL (06-19-20 @ 01:18)  Folate, Serum: 13.8 ng/mL (06-19-20 @ 01:18)  Iron - Total Binding Capacity.: 201 ug/dL (06-19-20 @ 01:13)  Reticulocyte Percent: 3.9 % (06-18-20 @ 16:16)

## 2020-06-23 NOTE — PROGRESS NOTE ADULT - ASSESSMENT
Assessment: 49 y.o M s/p TMA Revision (DOS: 6/16/20)    Plan:  Patient examined and evaluated with Dr. Todd  Surgical site dressing removed with no evidence of mal odor after change of antibiotic by I.D.  Approximately 5 cc of purulent drainage expressed from the wound dorsomedial aspect of incision and  Surgical site was then flushed with NS.  Surgical site was then dressed with DSD and ACE.  Patient is to be non weight bearing to the left lower extremity.  Antibiotics as per ID recommendations.  Pain Management per medical team        Wound Care Instructions  1. Surgical dressing is to remain clean, dry and intact until follow up appt  2. Pt is to remain NWB to the left foot  3. Patient is to [facility] up [facility] with Dr. Hale or Dr. Todd within 5 days upon discharge. Assessment: 49 y.o M s/p TMA Revision (DOS: 6/16/20)    Plan:  Patient examined and evaluated with Dr. Todd  Surgical site dressing removed with no evidence of mal odor.  Approximately 5 cc of purulent drainage expressed from the wound dorsomedial aspect of incision and  Surgical site was then flushed with NS.  Surgical site was then dressed with DSD and ACE.  Patient is to be non weight bearing to the left lower extremity.  Antibiotics as per ID recommendations.  Pain Management per medical team    Pt is Pod stable for discharge.      Wound Care Instructions  1. Surgical dressing is to remain clean, dry and intact until follow up appt  2. Pt is to remain NWB to the left foot  3. Patient is to follow up in [facility] with Dr. Hale or Dr. Todd within 5 days upon discharge.

## 2020-06-23 NOTE — PROGRESS NOTE ADULT - SUBJECTIVE AND OBJECTIVE BOX
49 y.o Male seen during AM podiatry rounds, s/p left foot TMA revision (DOS: 6/16/20). Patient relates no overnight events and states that they are doing well at this time. Pt relates that his pain is now under control. Denies any fever, chills, nausea, vomiting, chest pain, shortness of breath, or calf pain at this time.    Vital Signs Last 24 Hrs  T(C): 36.2 (23 Jun 2020 04:54), Max: 36.7 (22 Jun 2020 20:28)  T(F): 97.2 (23 Jun 2020 04:54), Max: 98.1 (22 Jun 2020 20:28)  HR: 51 (23 Jun 2020 04:54) (51 - 71)  BP: 112/71 (23 Jun 2020 04:54) (112/71 - 131/84)  BP(mean): --  RR: 18 (23 Jun 2020 04:54) (17 - 18)  SpO2: 98% (23 Jun 2020 04:54) (98% - 98%)    PHYSICAL EXAM:  Vascular: DP & PT palpable, Digital hair absent. erythema and edema noted to the dorsal foot.  Dermatological: Incision site of the left foot noted with intact sutures to all but previously removed sutures from dorsal aspect, mild winston-incision erythema, no proximal streaking, no fluctuance, no malodor, + purulent drainage at dorsomedial incision. on dressing.  Neurological: Light touch sensation intact bilaterally  Musculoskeletal: s/p left TMA w/achilles tenotomy. POP with expression of drainage                          9.3    6.87  )-----------( 367      ( 22 Jun 2020 09:17 )             30.0       141  |  107  |  10  ----------------------------<  139<H>  3.6   |  27  |  0.83    Ca    8.6      22 Jun 2020 09:13        Culture - Urine (06.17.20 @ 18:51)    -  Tobramycin: S <=4    -  Tobramycin: S <=4    -  Gentamicin: S <=4    -  Gentamicin: I 8    -  Imipenem: S 2    -  Imipenem: S 2    -  Levofloxacin: R >4    -  Levofloxacin: R >4    -  Meropenem: S <=1    -  Meropenem: S <=1    -  Piperacillin/Tazobactam: S <=16    -  Piperacillin/Tazobactam: S <=16    -  Amikacin: S <=16    -  Amikacin: I 32    -  Aztreonam: S <=4    -  Aztreonam: S 8    -  Cefepime: S <=4    -  Cefepime: S 8    -  Ceftazidime: S 4    -  Ceftazidime: S 4    -  Ciprofloxacin: R >2    -  Ciprofloxacin: R >2    Specimen Source: .Urine Catheterized    Culture Results:   50,000 - 99,000 CFU/mL Pseudomonas aeruginosa  50,000 - 99,000 CFU/mL Pseudomonas aeruginosa #2  Multiple Morphological Strains    Organism Identification: Pseudomonas aeruginosa  Pseudomonas aeruginosa    Organism: Pseudomonas aeruginosa    Organism: Pseudomonas aeruginosa    Method Type: BAYLEE    Method Type: BAYLEE    Culture - Blood (06.16.20 @ 16:36)    Gram Stain:   Growth in anaerobic bottle: Gram Positive Cocci in Clusters    -  Multidrug (KPC pos) resistant organism: Nondet    -  Staphylococcus aureus: Nondet    -  Methicillin resistant Staphylococcus aureus (MRSA): Nondet    -  Coagulase negative Staphylococcus: Nondet    -  Enterococcus species: Nondet    -  Vancomycin resistant Enterococcus sp.: Nondet    -  Escherichia coli: Nondet    -  Klebsiella oxytoca: Nondet    -  Klebsiella pneumoniae: Nondet    -  Serratia marcescens: Nondet    -  Haemophilus influenzae: Nondet    -  Listeria monocytogenes: Nondet    -  Neisseria meningitidis: Nondet    -  Pseudomonas aeruginosa: Nondet    -  Acinetobacter baumanii: Nondet    -  Enterobacter cloacae complex: Nondet    -  Streptococcus sp. (Not Grp A, B or S pneumoniae): Nondet    -  Streptococcus agalactiae (Group B): Nondet    -  Streptococcus pyogenes (Group A): Nondet    -  Streptococcus pneumoniae: Nondet    -  Candida albicans: Nondet    -  Candida glabrata: Nondet    -  Candida krusei: Nondet    -  Candida parapsilosis: Nondet    -  Candida tropicalis: Nondet    Specimen Source: .Blood Blood-Peripheral    Organism: Blood Culture PCR    Culture Results:   Growth in anaerobic bottle: Gram Positive Cocci in Clusters  "Due to technical problems, Proteus sp. will Not be reported as part of  the BCID panel until further notice"  ***Blood Panel PCR results on this specimen are available  approximately 3 hours after the Gram stain result.***  Gram stain, PCR, and/or culture results may not always  correspond due to difference in methodologies.  ************************************************************  This PCR assay was performed using [employer].  The following targets are tested for: Enterococcus,  vancomycin resistant enterococci, Listeria monocytogenes,  coagulase negative staphylococci, S. aureus,  methicillin resistant S. aureus, Streptococcus agalactiae  (Group B), S. pneumoniae, S. pyogenes (Group A),  Acinetobacter baumannii, Enterobacter cloacae, E. coli,  Klebsiella oxytoca, K. pneumoniae, Proteus sp.,  Serratia marcescens, Haemophilus influenzae,  Neisseria meningitidis, Pseudomonas aeruginosa, Candida  albicans, C. glabrata, C krusei, C parapsilosis,  C. tropicalis and the KPC resistance gene.    Organism Identification: Blood Culture PCR    Method Type: PCR    Culture - Tissue with Gram Stain (06.17.20 @ 01:59)    -  Ampicillin: S <=2 Predicts results to ampicillin/sulbactam, amoxacillin-clavulanate and  piperacillin-tazobactam.    -  Ampicillin: S <=2 Predicts results to ampicillin/sulbactam, amoxacillin-clavulanate and  piperacillin-tazobactam.    -  Ampicillin/Sulbactam: S <=8/4    Gram Stain:   Rare polymorphonuclear leukocytes seen per low power field  No organisms seen per oil power field    -  Clindamycin: S <=0.25    -  Erythromycin: S <=0.25    -  Cefazolin: S <=4    -  Trimethoprim/Sulfamethoxazole: S <=0.5/9.5    -  Vancomycin: S 2    -  Vancomycin: S 0.5  Surgical Pathology Report (06.16.20 @ 20:37)    Surgical Pathology Report:   ACCESSION No:  [id number]    SARAH MORRISON                     2        Surgical Final Report          Final Diagnosis    1. Bone, metatarsals, excision:  Fragments of bone showing acute osteomyelitis and focal  mild chronic  osteomyelitis.  Active bone remodeling with new bone formation and focal  marrow fibrosis.    2. Bone, metatarsals, clean margin:  Bone, with adjacent soft tissue, showing mild chronic  osteomyelitis accompanied  by active bone remodeling with new bone formation and focal  marrow  fibrosis.    Verified by: MARIA A SERRATO  (Electronic Signature)  Reported on: 06/19/20 13:05 EDT, [facility], [address] [place] [address], [place] [address]  Phone: [phone]   Fax: [phone]  _________________________________________________________________    Clinical History  Left foot stump osteomyelitis  Procedure: Left TMA site revision    Specimen(s) Submitted  1-Left metatarsals bone  2-Left metatarsals bone, clean margin    Gross Description  1. The specimen is received in formalin and the specimen  container is labeled: Left metatarsals bone pathology. It  consists of four ovoid pieces of tan-yellow bony tissue with  smooth surgical cuts and attached gray-tan soft tissue measuring  in aggregate 3.0 x 2.5x 0.7 cm. Each piece is sectioned. The  specimen is entirely submitted following decalcification. Four  cassettes (one piece per cassette).    2. The specimen is received in formalin and the specimen  container is labeled: Left metatarsals bone cleanmargin  pathology. It consists of three pieces of tan-yellow bony tissue  with smooth surgical cuts measuring in aggregate 2.0 x 1.7 x 0.2  cm. Entirely submitted following decalcification. One cassette.                SARAH MORRISON      2        Surgical Final Report          In addition to other data that may appear on the specimen  containers, all labels have been inspected to confirm the  presence of the patient's name and date of birth.  MELISSA Crane ([facility]) 6/18/2020 12:18 PM    Perioperative Diagnosis  Left foot stump osteomyelitis    Postoperative Diagnosis  Same      -  Vancomycin: S 2    -  RIF- Rifampin: S <=1 Should not be used as monotherapy    -  Tetra/Doxy: S <=1    -  Tetra/Doxy: R >8    -  Tetra/Doxy: R >8    -  Penicillin: R >8    -  Oxacillin: S 0.5    -  Gentamicin: S <=1 Should not be used as monotherapy    Specimen Source: .Tissue Other, left metatarsal bone    Culture Results:   Rare Staphylococcus aureus  Rare Enterococcus avium  Rare Enterococcus faecalis    Organism Identification: Staphylococcus aureus  Enterococcus avium  Enterococcus faecalis    Organism: Enterococcus avium    Organism: Enterococcus faecalis    Organism: Staphylococcus aureus    Method Type: BAYLEE    Method Type: BAYLEE    Method Type: BAYLEE                  Imaging: left foot postop radiographs reveal s/p TMA 49 y.o Male seen during AM podiatry rounds, s/p left foot TMA revision (DOS: 6/16/20). Patient relates no overnight events and denies any pain to the foot. Denies any fever, chills, nausea, vomiting, chest pain, shortness of breath, or calf pain at this time.    Vital Signs Last 24 Hrs  T(C): 36.2 (23 Jun 2020 04:54), Max: 36.7 (22 Jun 2020 20:28)  T(F): 97.2 (23 Jun 2020 04:54), Max: 98.1 (22 Jun 2020 20:28)  HR: 51 (23 Jun 2020 04:54) (51 - 71)  BP: 112/71 (23 Jun 2020 04:54) (112/71 - 131/84)  BP(mean): --  RR: 18 (23 Jun 2020 04:54) (17 - 18)  SpO2: 98% (23 Jun 2020 04:54) (98% - 98%)    PHYSICAL EXAM:  Vascular: DP & PT palpable, Digital hair absent. erythema and edema noted to the dorsal foot.  Dermatological: Incision site of the left foot noted with intact sutures to all but previously removed sutures from dorsal aspect, mild winston-incision erythema, no proximal streaking, no fluctuance, no malodor, + purulent drainage at dorsomedial incision. on dressing.  Neurological: Light touch sensation intact bilaterally  Musculoskeletal: s/p left TMA w/achilles tenotomy. POP with expression of drainage                          9.3    6.87  )-----------( 367      ( 22 Jun 2020 09:17 )             30.0       141  |  107  |  10  ----------------------------<  139<H>  3.6   |  27  |  0.83    Ca    8.6      22 Jun 2020 09:13        Culture - Urine (06.17.20 @ 18:51)    -  Tobramycin: S <=4    -  Tobramycin: S <=4    -  Gentamicin: S <=4    -  Gentamicin: I 8    -  Imipenem: S 2    -  Imipenem: S 2    -  Levofloxacin: R >4    -  Levofloxacin: R >4    -  Meropenem: S <=1    -  Meropenem: S <=1    -  Piperacillin/Tazobactam: S <=16    -  Piperacillin/Tazobactam: S <=16    -  Amikacin: S <=16    -  Amikacin: I 32    -  Aztreonam: S <=4    -  Aztreonam: S 8    -  Cefepime: S <=4    -  Cefepime: S 8    -  Ceftazidime: S 4    -  Ceftazidime: S 4    -  Ciprofloxacin: R >2    -  Ciprofloxacin: R >2    Specimen Source: .Urine Catheterized    Culture Results:   50,000 - 99,000 CFU/mL Pseudomonas aeruginosa  50,000 - 99,000 CFU/mL Pseudomonas aeruginosa #2  Multiple Morphological Strains    Organism Identification: Pseudomonas aeruginosa  Pseudomonas aeruginosa    Organism: Pseudomonas aeruginosa    Organism: Pseudomonas aeruginosa    Method Type: BAYLEE    Method Type: BAYLEE    Culture - Blood (06.16.20 @ 16:36)    Gram Stain:   Growth in anaerobic bottle: Gram Positive Cocci in Clusters    -  Multidrug (KPC pos) resistant organism: Nondet    -  Staphylococcus aureus: Nondet    -  Methicillin resistant Staphylococcus aureus (MRSA): Nondet    -  Coagulase negative Staphylococcus: Nondet    -  Enterococcus species: Nondet    -  Vancomycin resistant Enterococcus sp.: Nondet    -  Escherichia coli: Nondet    -  Klebsiella oxytoca: Nondet    -  Klebsiella pneumoniae: Nondet    -  Serratia marcescens: Nondet    -  Haemophilus influenzae: Nondet    -  Listeria monocytogenes: Nondet    -  Neisseria meningitidis: Nondet    -  Pseudomonas aeruginosa: Nondet    -  Acinetobacter baumanii: Nondet    -  Enterobacter cloacae complex: Nondet    -  Streptococcus sp. (Not Grp A, B or S pneumoniae): Nondet    -  Streptococcus agalactiae (Group B): Nondet    -  Streptococcus pyogenes (Group A): Nondet    -  Streptococcus pneumoniae: Nondet    -  Candida albicans: Nondet    -  Candida glabrata: Nondet    -  Candida krusei: Nondet    -  Candida parapsilosis: Nondet    -  Candida tropicalis: Nondet    Specimen Source: .Blood Blood-Peripheral    Organism: Blood Culture PCR    Culture Results:   Growth in anaerobic bottle: Gram Positive Cocci in Clusters  "Due to technical problems, Proteus sp. will Not be reported as part of  the BCID panel until further notice"  ***Blood Panel PCR results on this specimen are available  approximately 3 hours after the Gram stain result.***  Gram stain, PCR, and/or culture results may not always  correspond due to difference in methodologies.  ************************************************************  This PCR assay was performed using [employer].  The following targets are tested for: Enterococcus,  vancomycin resistant enterococci, Listeria monocytogenes,  coagulase negative staphylococci, S. aureus,  methicillin resistant S. aureus, Streptococcus agalactiae  (Group B), S. pneumoniae, S. pyogenes (Group A),  Acinetobacter baumannii, Enterobacter cloacae, E. coli,  Klebsiella oxytoca, K. pneumoniae, Proteus sp.,  Serratia marcescens, Haemophilus influenzae,  Neisseria meningitidis, Pseudomonas aeruginosa, Candida  albicans, C. glabrata, C krusei, C parapsilosis,  C. tropicalis and the KPC resistance gene.    Organism Identification: Blood Culture PCR    Method Type: PCR    Culture - Tissue with Gram Stain (06.17.20 @ 01:59)    -  Ampicillin: S <=2 Predicts results to ampicillin/sulbactam, amoxacillin-clavulanate and  piperacillin-tazobactam.    -  Ampicillin: S <=2 Predicts results to ampicillin/sulbactam, amoxacillin-clavulanate and  piperacillin-tazobactam.    -  Ampicillin/Sulbactam: S <=8/4    Gram Stain:   Rare polymorphonuclear leukocytes seen per low power field  No organisms seen per oil power field    -  Clindamycin: S <=0.25    -  Erythromycin: S <=0.25    -  Cefazolin: S <=4    -  Trimethoprim/Sulfamethoxazole: S <=0.5/9.5    -  Vancomycin: S 2    -  Vancomycin: S 0.5  Surgical Pathology Report (06.16.20 @ 20:37)    Surgical Pathology Report:   ACCESSION No:  30 [id number]    SARAH MORRISON                     2        Surgical Final Report          Final Diagnosis    1. Bone, metatarsals, excision:  Fragments of bone showing acute osteomyelitis and focal  mild chronic  osteomyelitis.  Active bone remodeling with new bone formation and focal  marrow fibrosis.    2. Bone, metatarsals, clean margin:  Bone, with adjacent soft tissue, showing mild chronic  osteomyelitis accompanied  by active bone remodeling with new bone formation and focal  marrow  fibrosis.    Verified by: MARIA A SERRATO  (Electronic Signature)  Reported on: 06/19/20 13:05 EDT, [facility], [address] [place] [address]  Phone: [phone]   Fax: [phone]  _________________________________________________________________    Clinical History  Left foot stump osteomyelitis  Procedure: Left TMA site revision    Specimen(s) Submitted  1-Left metatarsals bone  2-Left metatarsals bone, clean margin    Gross Description  1. The specimen is received in formalin and the specimen  container is labeled: Left metatarsals bone pathology. It  consists of four ovoid pieces of tan-yellow bony tissue with  smooth surgical cuts and attached gray-tan soft tissue measuring  in aggregate 3.0 x 2.5x 0.7 cm. Each piece is sectioned. The  specimen is entirely submitted following decalcification. Four  cassettes (one piece per cassette).    2. The specimen is received in formalin and the specimen  container is labeled: Left metatarsals bone cleanmargin  pathology. It consists of three pieces of tan-yellow bony tissue  with smooth surgical cuts measuring in aggregate 2.0 x 1.7 x 0.2  cm. Entirely submitted following decalcification. One cassette.                SARAH MORRISON      2        Surgical Final Report          In addition to other data that may appear on the specimen  containers, all labels have been inspected to confirm the  presence of the patient's name and date of birth.  MELISSA Crane ([facility]) 6/18/2020 12:18 PM    Perioperative Diagnosis  Left foot stump osteomyelitis    Postoperative Diagnosis  Same      -  Vancomycin: S 2    -  RIF- Rifampin: S <=1 Should not be used as monotherapy    -  Tetra/Doxy: S <=1    -  Tetra/Doxy: R >8    -  Tetra/Doxy: R >8    -  Penicillin: R >8    -  Oxacillin: S 0.5    -  Gentamicin: S <=1 Should not be used as monotherapy    Specimen Source: .Tissue Other, left metatarsal bone    Culture Results:   Rare Staphylococcus aureus  Rare Enterococcus avium  Rare Enterococcus faecalis    Organism Identification: Staphylococcus aureus  Enterococcus avium  Enterococcus faecalis    Organism: Enterococcus avium    Organism: Enterococcus faecalis    Organism: Staphylococcus aureus    Method Type: BAYLEE    Method Type: BAYLEE    Method Type: BAYLEE                  Imaging: left foot postop radiographs reveal s/p TMA

## 2020-06-23 NOTE — PROGRESS NOTE ADULT - PROBLEM SELECTOR PROBLEM 2
Diabetes
Hyperthyroidism
Hyperthyroidism
Osteomyelitis of left foot, unspecified type
Osteomyelitis of left foot, unspecified type
Diabetes

## 2020-06-23 NOTE — PROGRESS NOTE ADULT - PROBLEM SELECTOR PLAN 3
Continue Lantus and pre meal.   Endo following.
Continue Lantus and pre meal.   Endo following.
hold AC for now  cardio consult
hold AC for now  cardio consult with dr. rashad moreira
hold AC for now  cardio consult with dr. solorzano
lovenox for AC  cardio consult with dr. rashad moreira
lovenox for AC temporarily  cardio consult with dr. rashad moreira
neg PCr for MRSA and improving so continue current abx for now. Role of repeat blood cultures based on ID but isolate may very well not be significant
neg PCr for MRSA and improving. w  repeat blood cx neg to date
neg PCr for MRSA and improving. w  repeat blood cx neg to date
neg PCr for MRSA and improving. w  repeat blood cx neg to date  From an ID standpoint no further requirement for inpatient status for the management of ID issues. Fine with discharge from ID standpoint when other medical issues no longer require inpatient care and social issues allow for a safe discharge plan.  Thank you for consulting us and involving us in the management of this most interesting and challenging case.     Please Call with any further questions
neg PCr for MRSA and improving. will order repeat blood cuilture today

## 2020-06-23 NOTE — PROGRESS NOTE ADULT - SUBJECTIVE AND OBJECTIVE BOX
[facility] -- Bloivar Carbajal Grossman, [place], Reynaldo Sweeney Savella, Goodger: Office # [phone]    Follow Up:  Afib/ left foot infection bleeding       Subjective/Observations: Patient seen and examined. Patient awake and alert, resting comfortably in bed. No complaints of chest pain, SOB, LE edema, cough. No signs of orthopnea or PND. No bleeding from foot at present     REVIEW OF SYSTEMS: All review of systems is negative for eye, ENT, GI, , allergic, dermatologic, musculoskeletal and neurologic except as described above    PAST MEDICAL & SURGICAL HISTORY:  Cerebrovascular accident  CAD S/P percutaneous coronary angioplasty  Osteomyelitis: s/p debridement  History of non-ST elevation myocardial infarction (NSTEMI)  Pulmonary embolism  Perforated gastric ulcer: s/p emergent ex-lap omentopexy and plication 6/2019  BPH (benign prostatic hyperplasia)  Hypertension  Hypertension  Afib  Diabetes mellitus with no complication  Diabetes  Traumatic amputation of left foot, initial encounter  Perforated gastric ulcer  H/O abdominal surgery    MEDICATIONS  (STANDING):  ampicillin/sulbactam  IVPB 3 Gram(s) IV Intermittent every 6 hours  ascorbic acid 1000 milliGRAM(s) Oral daily  atorvastatin 40 milliGRAM(s) Oral at bedtime  bethanechol 25 milliGRAM(s) Oral three times a day  dextrose 5%. 1000 milliLiter(s) (50 mL/Hr) IV Continuous <Continuous>  dextrose 50% Injectable 12.5 Gram(s) IV Push once  dextrose 50% Injectable 25 Gram(s) IV Push once  dextrose 50% Injectable 25 Gram(s) IV Push once  enoxaparin Injectable 100 milliGRAM(s) SubCutaneous every 12 hours  famotidine    Tablet 20 milliGRAM(s) Oral every 24 hours  ferrous    sulfate 325 milliGRAM(s) Oral two times a day  gabapentin 100 milliGRAM(s) Oral at bedtime  insulin glargine Injectable (LANTUS) 15 Unit(s) SubCutaneous at bedtime  insulin lispro (HumaLOG) corrective regimen sliding scale   SubCutaneous three times a day before meals  insulin lispro (HumaLOG) corrective regimen sliding scale   SubCutaneous at bedtime  insulin lispro Injectable (HumaLOG) 5 Unit(s) SubCutaneous three times a day with meals  losartan 25 milliGRAM(s) Oral daily  methimazole 10 milliGRAM(s) Oral daily  metoprolol tartrate 50 milliGRAM(s) Oral every 12 hours  multivitamin 1 Tablet(s) Oral daily  pantoprazole    Tablet 40 milliGRAM(s) Oral before breakfast  phenazopyridine 100 milliGRAM(s) Oral every 8 hours  potassium chloride    Tablet ER 10 milliEquivalent(s) Oral daily  senna 2 Tablet(s) Oral at bedtime  sucralfate 1 Gram(s) Oral four times a day  tamsulosin 0.8 milliGRAM(s) Oral at bedtime    MEDICATIONS  (PRN):  acetaminophen   Tablet .. 650 milliGRAM(s) Oral every 6 hours PRN Temp greater or equal to 38C (100.4F), Mild Pain (1 - 3)  bisacodyl Suppository 10 milliGRAM(s) Rectal daily PRN Constipation  dextrose 40% Gel 15 Gram(s) Oral once PRN Blood Glucose LESS THAN 70 milliGRAM(s)/deciliter  glucagon  Injectable 1 milliGRAM(s) IntraMuscular once PRN Glucose LESS THAN 70 milligrams/deciliter  morphine  - Injectable 2 milliGRAM(s) IV Push every 6 hours PRN breakthrough pain  ondansetron Injectable 4 milliGRAM(s) IV Push once PRN Nausea and/or Vomiting  oxyCODONE    IR 5 milliGRAM(s) Oral every 6 hours PRN Severe Pain (7 - 10)  traMADol 25 milliGRAM(s) Oral every 6 hours PRN Moderate Pain (4 - 6)    Allergies  fish (Hives)  No Known Drug Allergies    Vital Signs Last 24 Hrs  T(C): 36.2 (23 Jun 2020 04:54), Max: 36.7 (22 Jun 2020 20:28)  T(F): 97.2 (23 Jun 2020 04:54), Max: 98.1 (22 Jun 2020 20:28)  HR: 51 (23 Jun 2020 04:54) (51 - 71)  BP: 112/71 (23 Jun 2020 04:54) (112/71 - 131/84)  BP(mean): --  RR: 18 (23 Jun 2020 04:54) (17 - 18)  SpO2: 98% (23 Jun 2020 04:54) (98% - 98%)    I&O's Summary    22 Jun 2020 07:01  -  23 Jun 2020 07:00  --------------------------------------------------------  IN: 0 mL / OUT: 3675 mL / NET: -3675 mL    TELE: Not on telemetry   PHYSICAL EXAM:  Appearance: NAD, no distress, alert, Well developed   HEENT: Moist Mucous Membranes, Anicteric  Cardiovascular: Regular rate and rhythm, Normal S1 S2, No JVD, No murmurs, No rubs, gallops or clicks  Respiratory: Non-labored, Clear to auscultation, No rales, No rhonchi, No wheezing.   Gastrointestinal:  Soft, Non-tender, + BS  : + Aguilera orange urine  Neurologic: Non-focal  Skin: Warm and dry, Lt foot DSD No ecchymosis, No cyanosis  Musculoskeletal: No clubbing, No cyanosis, No joint swelling/tenderness  Psychiatry: Mood & affect appropriate  Lymph: No peripheral edema.     LABS: All Labs Reviewed:                        9.3    6.87  )-----------( 367      ( 22 Jun 2020 09:17 )             30.0                         9.3    8.32  )-----------( 370      ( 21 Jun 2020 07:23 )             30.3     22 Jun 2020 09:13    141    |  107    |  10     ----------------------------<  139    3.6     |  27     |  0.83     Ca    8.6        22 Jun 2020 09:13    Triiodothyronine, Total (T3 Total): 68 ng/dL (06-18-20 @ 11:01)    12 Lead ECG:   Ventricular Rate 61 BPM  Atrial Rate 61 BPM  P-R Interval 146 ms  QRS Duration 82 ms  Q-T Interval 416 ms  QTC Calculation(Bezet) 418 ms  P Axis 42 degrees  R Axis 19 degrees  T Axis 54 degrees  Diagnosis Line Normal sinus rhythm  Normal ECG  When compared with ECG of 16-JUN-2020 11:37,  No significant change was found  Confirmed by DAT CALLAHAN ([id number]) on 6/17/2020 4:51:28 PM (06-17-20 @ 08:18)    < from: TTE Echo Doppler w/o Cont (12.24.19 @ 13:55) >  Dimensions:    LA 4.2       Normal Values: 2.0 - 4.0 cm    Ao 4.0        Normal Values: 2.0 - 3.8 cm  SEPTUM 1.6       Normal Values: 0.6 - 1.2 cm  PWT 1.6       Normal Values: 0.6 - 1.1 cm  LVIDd 5.8         Normal Values: 3.0 - 5.6 cm  LVIDs 3.2         Normal Values: 1.8 - 4.0 cm    Derived Variables:  LVMI g/m2  RWT  Fractional Short  Ejection Fraction    Doppler Peak v. AoV= (m/sec)    OBSERVATIONS:    Mitral Valve: normal, mild MR.  Aortic Valve/Aorta: normal trileaflet aortic valve.  Tricuspid Valve: normal with trace TR.  Pulmonic Valve: normal  Left Atrium: Enlarged  Right Atrium: normal  Left Ventricle: Severe concentric LVH with normal systolic function, estimated LVEF of 65%.  Right Ventricle: normal size and systolic function.  Pericardium/Pleura: no significant pleural effusion, no significant pericardial effusion.  Pulmonary/RV Pressure: Inadequate TR jet to estimate PA systolic pressure  LV Diastolic Function: Grade 2diastolic dysfunction.    Severe concentric LVH, and mild LV enlargement, with normal systolic function, estimated LVEF of 65%. Normal right ventricular size and systolic function. Grade 2diastolic dysfunction. Left atrial enlargement The aortic root is normal in size. The mitral valve is structurally normal, mild MR. The aortic valve is trileaflet without stenosis or insufficiency. Trace physiologic TR. Inadequate TR jet to estimate PA systolic pressure No significant pericardial effusion.        < end of copied text >    < from: Cardiac Cath Lab - Adult (08.02.19 @ 08:55) >  VENTRICLES: No left ventriculogram was performed.  CORONARY VESSELS: The coronary circulation is right dominant.  RCA:   --  Proximal RCA: There was a 95 % stenosis.  COMPLICATIONS: There were no complications.  INTERVENTIONAL IMPRESSIONS: Successful stenting of the RCA with BMS  INTERVENTIONAL RECOMMENDATIONS: Aspirin for 1 week. Plavix for 3 weeks. Eliquis for PE (duration TBD) - limit triple therapy for 1 week then continue with plavix eliquis for 3 weeks. This modified DAPT regimen is due to his recent perforated ulcer and surgery.    < end of copied text >    < from: Cardiac Cath Lab - Adult (08.01.19 @ 14:11) >  (Isoptin, Calan, Covera), 2.5 mg, IA. Heparin, 3000 units, IA.  VENTRICLES: No left ventriculogram was performed.  CORONARY VESSELS: The coronary circulation is right dominant.  LM:   --  LM: Normal.  LAD:   --  Mid LAD: There was a tubular 30 % stenosis.  --  D1: There was a tubular 40 % stenosis.  CX:   --  Circumflex: Angiography showed minor luminal irregularities with no flow limiting lesions.  --  OM1: Angiography showed minor luminal irregularities with no flow limiting lesions.  --  OM2: Angiography showed minor luminal irregularities with no flow limiting lesions.  RCA:   --  Proximal RCA: There was a 95 % stenosis.  --  RPLS: There was a 100 % stenosis.  COMPLICATIONS: There were no complications.  DIAGNOSTIC IMPRESSIONS: Severe RCA disease (95% prox disease, occluded  RPL). Mild LCx and Mild LAD disease.  DIAGNOSTIC RECOMMENDATIONS: Will review with GI/Surgery risk of triple therapy prior to proceeding with PCI    < end of copied text >    < from: Xray Chest 1 View AP/PA (06.16.20 @ 10:04) >  FINDINGS:  Lungs:There are no lung consolidations. Small left pleural effusion.  Heart: The heart is normal in size.  Mediastinum: The mediastinum is within normal limits.    IMPRESSION:  No lung consolidations.  Small left pleural effusion.    < end of copied text >

## 2020-06-23 NOTE — PROGRESS NOTE ADULT - SUBJECTIVE AND OBJECTIVE BOX
infectious diseases progress note:    SARAH MORRISON is a 49y y. o. Male patient    No concerning overnight events    Allergies    fish (Hives)  No Known Drug Allergies    Intolerances        ANTIBIOTICS/RELEVANT:  antimicrobials  ampicillin/sulbactam  IVPB 3 Gram(s) IV Intermittent every 6 hours    immunologic:    OTHER:  acetaminophen   Tablet .. 650 milliGRAM(s) Oral every 6 hours PRN  ascorbic acid 1000 milliGRAM(s) Oral daily  atorvastatin 40 milliGRAM(s) Oral at bedtime  bethanechol 25 milliGRAM(s) Oral three times a day  bisacodyl Suppository 10 milliGRAM(s) Rectal daily PRN  dextrose 40% Gel 15 Gram(s) Oral once PRN  dextrose 5%. 1000 milliLiter(s) IV Continuous <Continuous>  dextrose 50% Injectable 12.5 Gram(s) IV Push once  dextrose 50% Injectable 25 Gram(s) IV Push once  dextrose 50% Injectable 25 Gram(s) IV Push once  enoxaparin Injectable 100 milliGRAM(s) SubCutaneous every 12 hours  famotidine    Tablet 20 milliGRAM(s) Oral every 24 hours  ferrous    sulfate 325 milliGRAM(s) Oral two times a day  gabapentin 100 milliGRAM(s) Oral at bedtime  glucagon  Injectable 1 milliGRAM(s) IntraMuscular once PRN  insulin glargine Injectable (LANTUS) 15 Unit(s) SubCutaneous at bedtime  insulin lispro (HumaLOG) corrective regimen sliding scale   SubCutaneous three times a day before meals  insulin lispro (HumaLOG) corrective regimen sliding scale   SubCutaneous at bedtime  insulin lispro Injectable (HumaLOG) 5 Unit(s) SubCutaneous three times a day with meals  losartan 25 milliGRAM(s) Oral daily  methimazole 10 milliGRAM(s) Oral daily  metoprolol tartrate 50 milliGRAM(s) Oral every 12 hours  morphine  - Injectable 2 milliGRAM(s) IV Push every 6 hours PRN  multivitamin 1 Tablet(s) Oral daily  ondansetron Injectable 4 milliGRAM(s) IV Push once PRN  oxyCODONE    IR 5 milliGRAM(s) Oral every 6 hours PRN  pantoprazole    Tablet 40 milliGRAM(s) Oral before breakfast  phenazopyridine 100 milliGRAM(s) Oral every 8 hours  potassium chloride    Tablet ER 10 milliEquivalent(s) Oral daily  senna 2 Tablet(s) Oral at bedtime  sucralfate 1 Gram(s) Oral four times a day  tamsulosin 0.8 milliGRAM(s) Oral at bedtime  traMADol 25 milliGRAM(s) Oral every 6 hours PRN      Objective:  Vital Signs Last 24 Hrs  T(C): 36.2 (23 Jun 2020 04:54), Max: 36.7 (22 Jun 2020 20:28)  T(F): 97.2 (23 Jun 2020 04:54), Max: 98.1 (22 Jun 2020 20:28)  HR: 51 (23 Jun 2020 04:54) (51 - 71)  BP: 112/71 (23 Jun 2020 04:54) (112/71 - 131/84)  BP(mean): --  RR: 18 (23 Jun 2020 04:54) (17 - 18)  SpO2: 98% (23 Jun 2020 04:54) (98% - 98%)    T(C): 36.2 (06-23-20 @ 04:54), Max: 36.9 (06-22-20 @ 04:39)  T(C): 36.2 (06-23-20 @ 04:54), Max: 37.1 (06-20-20 @ 21:29)  T(C): 36.2 (06-23-20 @ 04:54), Max: 37.1 (06-20-20 @ 21:29)    PHYSICAL EXAM:  HEENT: NC atraumatic  Neck: supple  Respiratory: no accessory muscle use, breathing comfortably  Cardiovascular: distant  Gastrointestinal: normal appearing, nondistended  Extremities: no clubbing, no cyanosis, foot wrapped      LABS:                          9.9    6.14  )-----------( 370      ( 23 Jun 2020 09:22 )             32.2       6.14 06-23 @ 09:22  6.87 06-22 @ 09:17  8.32 06-21 @ 07:23  6.82 06-20 @ 08:29  8.88 06-19 @ 08:46  8.80 06-18 @ 09:20  10.68 06-17 @ 07:02  16.09 06-16 @ 18:26  17.61 06-16 @ 12:23      06-22    141  |  107  |  10  ----------------------------<  139<H>  3.6   |  27  |  0.83    Ca    8.6      22 Jun 2020 09:13        Creatinine, Serum: 0.83 mg/dL (06-22-20 @ 09:13)  Creatinine, Serum: 0.81 mg/dL (06-18-20 @ 09:20)  Creatinine, Serum: 0.79 mg/dL (06-17-20 @ 07:02)                INFLAMMATORY MARKERS  Auto Neutrophil #: 6.62 K/uL (06-18-20 @ 09:20)  Auto Lymphocyte #: 1.06 K/uL (06-18-20 @ 09:20)  Auto Neutrophil #: 8.15 K/uL (06-17-20 @ 07:02)  Auto Lymphocyte #: 1.36 K/uL (06-17-20 @ 07:02)  Auto Lymphocyte #: 1.11 K/uL (06-16-20 @ 10:53)  Auto Neutrophil #: 6.92 K/uL (06-16-20 @ 10:53)    Lactate, Blood: 0.9 mmol/L (06-17-20 @ 07:02)  Lactate, Blood: 3.7 mmol/L (06-16-20 @ 10:53)    Auto Eosinophil #: 0.21 K/uL (06-18-20 @ 09:20)  Auto Eosinophil #: 0.14 K/uL (06-17-20 @ 07:02)  Auto Eosinophil #: 0.20 K/uL (06-16-20 @ 10:53)      Sedimentation Rate, Erythrocyte: 42 mm/hr (06-16-20 @ 10:53)              Ferritin, Serum: 86 ng/mL (06-19-20 @ 01:18)        INR: 1.22 ratio (06-18-20 @ 09:20)  INR: 1.38 ratio (06-17-20 @ 07:02)  INR: 1.40 ratio (06-16-20 @ 18:26)  Activated Partial Thromboplastin Time: 34.9 sec (06-16-20 @ 18:26)  Activated Partial Thromboplastin Time: 35.2 sec (06-16-20 @ 10:53)  INR: 1.55 ratio (06-16-20 @ 10:53)          MICROBIOLOGY:              RADIOLOGY & ADDITIONAL STUDIES:

## 2020-06-23 NOTE — PROGRESS NOTE ADULT - PROBLEM SELECTOR PLAN 2
Chronic osteo with fragments of bone showing acute osteomyelitis.   ABx as per ID.
Chronic osteo with fragments of bone showing acute osteomyelitis.   ABx as per ID.
Continue metformin and lantus  RISS  Check a1c
Continue metformin and lantus  RISS  Check a1c, endo eval
cont methimazole 10mg daily
cont methimazole 10mg daily
good but not tight control

## 2020-06-23 NOTE — PROGRESS NOTE ADULT - PROBLEM SELECTOR PLAN 1
sp revision with noted chronic osteomyelitis in remaining margin sample, micro with enterococcus and MSSA  recommend  Augmentin 875mg PO BID with last day 7/14 but then will need to be assessed for possible longer duration

## 2020-06-23 NOTE — PROGRESS NOTE ADULT - ASSESSMENT
49M PMH CAD s/p BMS to RCA (8/2019), RUL subsegmental PE (6/2019, on Eliquis), hx of NSTEMI and PAF s/p ex-lap omentopexy and plication for perforated antral ulcer (5/2019),  CVA s/p tPA (8/2019), HTN and DM2, osteomyelitis, s/p debridement, s/p left TMA 5/2020, without complication p/w bleeding from wound. In the ER his BP was initially marginal.  He dropped his BP to 90/40, and had two episodes of bleeding from the wound, which was wrapped with a pressure dressing, he then developed chest pain. - Currently CP free.      CAD  - Patient with known CAD, has BMS to prox RCA 8/2019, and an occluded RPLs not intervened upon. Pt known severe LVH with normal LVEF   - Echo 12/24 as delineated above revealing severe LVH, normal systolic function ef 65% grade 2 diastolic dysfunction, mild MR, trace TR   - Currently off of all antiplatelets  - Resume ASA when able given PCI.   - There is no evidence of acute ischemia or overload   - Continue losartan, metoprolol  - Continue statin	    Atrial Fibrillation   - Continue Lovenox 100mg Q12hrs   - Continue BB    Hypertension  - BP: 112/71 (06-23-20 @ 04:54) (112/71 - 131/84)  - Continue losartan, metoprolol  - Monitor routine hemodynamics     Anemia   - Hemoglobin: 9.3 (06-22-20 @ 09:17) Hematocrit: 30.0 (06-22-20 @ 09:17)  - Per primary   - Transfuse prn with PRBC to keep Hb >8 given CAD    Osteomyelitis Foot  - Per primary/podiatry   - Follow ID recommendations.     - Monitor and replete lytes, keep K>4, Mg>2.  - All other medical needs as per primary team.  - Other cardiovascular workup will depend on clinical course.  - Will continue to follow.      Danny Arce, MS FNP, [facility]  Nurse Practitioner- Cardiology   Spectra #[phone]

## 2020-06-23 NOTE — PROGRESS NOTE ADULT - ASSESSMENT
IMPRESSION:  49/m with multiple co morbid medical history, including hx of cad, s/p mi, h/o pe, h/o a.fibb, h/o abd surgery for perforated gastric ulcer in past who had left foot TMA surgery around 5/20/2020, was admitted around 6/16 for pain, bleeding and redness at wound site, seen by podiatry and underwent revision left foot TMA 6/16/2020 (estimated 300 ml blood loss per surgery), had 3 units prbc , 1 unit platelets and 1 unit ffp this admission,patient was admitted with mild anemia,  hb was around 10.5 this admission, getting treatment for possible OMleft foot infection as per id and is on abx as per id. hx a.fibb and cardiology is following    RECOMMENDATION:  Anemia--seems multifactorial, likely from blood loss/post surgery, blood loss from wound site with revision tma 6/16  iron profile--low iron, transferrin saturation, patient is on iv iron as looking for a rapid response, day 2 (2/2) today, po iron out patient  hb is at 9.9 today, hb was at 9.3 yesterday--h/h low but stable  no evidence of hemolysis, negative fobt  monitor h/h--transfuse prbc as needed for symptomatic anemia  h/o pe   hx of a.fibb--is on therapeutic dose lovenox  supportive care  case d/w pt at bedside and is in agreement

## 2020-06-24 ENCOUNTER — TRANSCRIPTION ENCOUNTER (OUTPATIENT)
Age: 50
End: 2020-06-24

## 2020-06-24 VITALS
SYSTOLIC BLOOD PRESSURE: 137 MMHG | TEMPERATURE: 99 F | RESPIRATION RATE: 16 BRPM | DIASTOLIC BLOOD PRESSURE: 64 MMHG | HEART RATE: 90 BPM | OXYGEN SATURATION: 99 %

## 2020-06-24 LAB
CULTURE RESULTS: SIGNIFICANT CHANGE UP
CULTURE RESULTS: SIGNIFICANT CHANGE UP
HCT VFR BLD CALC: 32.9 % — LOW (ref 39–50)
HGB BLD-MCNC: 10 G/DL — LOW (ref 13–17)
MCHC RBC-ENTMCNC: 28.7 PG — SIGNIFICANT CHANGE UP (ref 27–34)
MCHC RBC-ENTMCNC: 30.4 GM/DL — LOW (ref 32–36)
MCV RBC AUTO: 94.3 FL — SIGNIFICANT CHANGE UP (ref 80–100)
NRBC # BLD: 0 /100 WBCS — SIGNIFICANT CHANGE UP (ref 0–0)
PLATELET # BLD AUTO: 349 K/UL — SIGNIFICANT CHANGE UP (ref 150–400)
RBC # BLD: 3.49 M/UL — LOW (ref 4.2–5.8)
RBC # FLD: 17.2 % — HIGH (ref 10.3–14.5)
SARS-COV-2 RNA SPEC QL NAA+PROBE: SIGNIFICANT CHANGE UP
SPECIMEN SOURCE: SIGNIFICANT CHANGE UP
SPECIMEN SOURCE: SIGNIFICANT CHANGE UP
WBC # BLD: 5.36 K/UL — SIGNIFICANT CHANGE UP (ref 3.8–10.5)
WBC # FLD AUTO: 5.36 K/UL — SIGNIFICANT CHANGE UP (ref 3.8–10.5)

## 2020-06-24 PROCEDURE — P9037: CPT

## 2020-06-24 PROCEDURE — 82270 OCCULT BLOOD FECES: CPT

## 2020-06-24 PROCEDURE — 85045 AUTOMATED RETICULOCYTE COUNT: CPT

## 2020-06-24 PROCEDURE — 83735 ASSAY OF MAGNESIUM: CPT

## 2020-06-24 PROCEDURE — 97162 PT EVAL MOD COMPLEX 30 MIN: CPT

## 2020-06-24 PROCEDURE — 83550 IRON BINDING TEST: CPT

## 2020-06-24 PROCEDURE — 86850 RBC ANTIBODY SCREEN: CPT

## 2020-06-24 PROCEDURE — 73630 X-RAY EXAM OF FOOT: CPT

## 2020-06-24 PROCEDURE — 85014 HEMATOCRIT: CPT

## 2020-06-24 PROCEDURE — 84100 ASSAY OF PHOSPHORUS: CPT

## 2020-06-24 PROCEDURE — 86920 COMPATIBILITY TEST SPIN: CPT

## 2020-06-24 PROCEDURE — P9016: CPT

## 2020-06-24 PROCEDURE — 84134 ASSAY OF PREALBUMIN: CPT

## 2020-06-24 PROCEDURE — P9059: CPT

## 2020-06-24 PROCEDURE — 36415 COLL VENOUS BLD VENIPUNCTURE: CPT

## 2020-06-24 PROCEDURE — 88311 DECALCIFY TISSUE: CPT

## 2020-06-24 PROCEDURE — C1889: CPT

## 2020-06-24 PROCEDURE — 97116 GAIT TRAINING THERAPY: CPT

## 2020-06-24 PROCEDURE — 82962 GLUCOSE BLOOD TEST: CPT

## 2020-06-24 PROCEDURE — 82746 ASSAY OF FOLIC ACID SERUM: CPT

## 2020-06-24 PROCEDURE — 86140 C-REACTIVE PROTEIN: CPT

## 2020-06-24 PROCEDURE — 87075 CULTR BACTERIA EXCEPT BLOOD: CPT

## 2020-06-24 PROCEDURE — 71045 X-RAY EXAM CHEST 1 VIEW: CPT

## 2020-06-24 PROCEDURE — 82607 VITAMIN B-12: CPT

## 2020-06-24 PROCEDURE — 83010 ASSAY OF HAPTOGLOBIN QUANT: CPT

## 2020-06-24 PROCEDURE — 84439 ASSAY OF FREE THYROXINE: CPT

## 2020-06-24 PROCEDURE — 85610 PROTHROMBIN TIME: CPT

## 2020-06-24 PROCEDURE — 83880 ASSAY OF NATRIURETIC PEPTIDE: CPT

## 2020-06-24 PROCEDURE — 87150 DNA/RNA AMPLIFIED PROBE: CPT

## 2020-06-24 PROCEDURE — U0003: CPT

## 2020-06-24 PROCEDURE — 99285 EMERGENCY DEPT VISIT HI MDM: CPT | Mod: 25

## 2020-06-24 PROCEDURE — 87186 SC STD MICRODIL/AGAR DIL: CPT

## 2020-06-24 PROCEDURE — 85652 RBC SED RATE AUTOMATED: CPT

## 2020-06-24 PROCEDURE — 86901 BLOOD TYPING SEROLOGIC RH(D): CPT

## 2020-06-24 PROCEDURE — 85730 THROMBOPLASTIN TIME PARTIAL: CPT

## 2020-06-24 PROCEDURE — 88304 TISSUE EXAM BY PATHOLOGIST: CPT

## 2020-06-24 PROCEDURE — 85027 COMPLETE CBC AUTOMATED: CPT

## 2020-06-24 PROCEDURE — 80053 COMPREHEN METABOLIC PANEL: CPT

## 2020-06-24 PROCEDURE — 83540 ASSAY OF IRON: CPT

## 2020-06-24 PROCEDURE — 84480 ASSAY TRIIODOTHYRONINE (T3): CPT

## 2020-06-24 PROCEDURE — 36430 TRANSFUSION BLD/BLD COMPNT: CPT

## 2020-06-24 PROCEDURE — 83605 ASSAY OF LACTIC ACID: CPT

## 2020-06-24 PROCEDURE — 99024 POSTOP FOLLOW-UP VISIT: CPT

## 2020-06-24 PROCEDURE — 81001 URINALYSIS AUTO W/SCOPE: CPT

## 2020-06-24 PROCEDURE — 84436 ASSAY OF TOTAL THYROXINE: CPT

## 2020-06-24 PROCEDURE — 85018 HEMOGLOBIN: CPT

## 2020-06-24 PROCEDURE — 87635 SARS-COV-2 COVID-19 AMP PRB: CPT

## 2020-06-24 PROCEDURE — 87040 BLOOD CULTURE FOR BACTERIA: CPT

## 2020-06-24 PROCEDURE — 80048 BASIC METABOLIC PNL TOTAL CA: CPT

## 2020-06-24 PROCEDURE — 86900 BLOOD TYPING SEROLOGIC ABO: CPT

## 2020-06-24 PROCEDURE — 93005 ELECTROCARDIOGRAM TRACING: CPT

## 2020-06-24 PROCEDURE — 84481 FREE ASSAY (FT-3): CPT

## 2020-06-24 PROCEDURE — 82728 ASSAY OF FERRITIN: CPT

## 2020-06-24 PROCEDURE — 83036 HEMOGLOBIN GLYCOSYLATED A1C: CPT

## 2020-06-24 PROCEDURE — 84443 ASSAY THYROID STIM HORMONE: CPT

## 2020-06-24 PROCEDURE — 99232 SBSQ HOSP IP/OBS MODERATE 35: CPT

## 2020-06-24 PROCEDURE — 87070 CULTURE OTHR SPECIMN AEROBIC: CPT

## 2020-06-24 PROCEDURE — 87086 URINE CULTURE/COLONY COUNT: CPT

## 2020-06-24 PROCEDURE — 97165 OT EVAL LOW COMPLEX 30 MIN: CPT

## 2020-06-24 RX ORDER — GABAPENTIN 400 MG/1
1 CAPSULE ORAL
Qty: 0 | Refills: 0 | DISCHARGE
Start: 2020-06-24

## 2020-06-24 RX ADMIN — Medication 5 UNIT(S): at 12:56

## 2020-06-24 RX ADMIN — Medication 1 GRAM(S): at 04:53

## 2020-06-24 RX ADMIN — FAMOTIDINE 20 MILLIGRAM(S): 10 INJECTION INTRAVENOUS at 04:53

## 2020-06-24 RX ADMIN — Medication 10 MILLIEQUIVALENT(S): at 12:58

## 2020-06-24 RX ADMIN — Medication 2: at 12:56

## 2020-06-24 RX ADMIN — Medication 100 MILLIGRAM(S): at 04:53

## 2020-06-24 RX ADMIN — Medication 100 MILLIGRAM(S): at 13:02

## 2020-06-24 RX ADMIN — Medication 1 TABLET(S): at 04:53

## 2020-06-24 RX ADMIN — ENOXAPARIN SODIUM 100 MILLIGRAM(S): 100 INJECTION SUBCUTANEOUS at 04:53

## 2020-06-24 RX ADMIN — Medication 5 UNIT(S): at 08:47

## 2020-06-24 RX ADMIN — Medication 2: at 08:47

## 2020-06-24 RX ADMIN — PANTOPRAZOLE SODIUM 40 MILLIGRAM(S): 20 TABLET, DELAYED RELEASE ORAL at 04:53

## 2020-06-24 RX ADMIN — Medication 1000 MILLIGRAM(S): at 12:59

## 2020-06-24 RX ADMIN — Medication 1 GRAM(S): at 13:01

## 2020-06-24 RX ADMIN — LOSARTAN POTASSIUM 25 MILLIGRAM(S): 100 TABLET, FILM COATED ORAL at 04:53

## 2020-06-24 RX ADMIN — Medication 25 MILLIGRAM(S): at 04:53

## 2020-06-24 RX ADMIN — Medication 25 MILLIGRAM(S): at 13:01

## 2020-06-24 RX ADMIN — Medication 1 TABLET(S): at 12:58

## 2020-06-24 RX ADMIN — Medication 325 MILLIGRAM(S): at 04:53

## 2020-06-24 NOTE — DISCHARGE NOTE PROVIDER - NSDCMRMEDTOKEN_GEN_ALL_CORE_FT
Admelog SoloStar 100 units/mL injectable solution: Inject subcutaneously by sliding scale as per prescriber&#x27;s instructions.  amoxicillin-clavulanate 875 mg-125 mg oral tablet: 1 tab(s) orally 2 times a day  apixaban 5 mg oral tablet: 1 tab(s) orally 2 times a day  aspirin 81 mg oral tablet, chewable: 1 tab(s) orally once a day  atorvastatin 40 mg oral tablet: 1 tab(s) orally once a day  Basaglar KwikPen 100 units/mL subcutaneous solution: 10 unit(s) subcutaneous 2 times a day  bethanechol 25 mg oral tablet: 1 tab(s) orally 3 times a day  Colace 100 mg oral capsule: 3 cap(s) orally once a day (at bedtime)  Dulcolax Laxative 10 mg rectal suppository: 1 suppository(ies) rectal once a day, As Needed  famotidine 20 mg oral tablet: 1 tab(s) orally once a day (in the morning)  ferrous sulfate 325 mg (65 mg elemental iron) oral tablet: 1 tab(s) orally 2 times a day  Flomax 0.4 mg oral capsule: 2 cap(s) orally once a day (in the evening)  gabapentin 100 mg oral capsule: 1 cap(s) orally once a day (at bedtime)  Imdur 30 mg oral tablet, extended release: 1 tab(s) orally once a day (in the morning)  losartan 25 mg oral tablet: 1 tab(s) orally once a day  metFORMIN 1000 mg oral tablet: 1 tab(s) orally 2 times a day with morning and evening meals  methIMAzole 10 mg oral tablet: 1 tab(s) orally once a day  metoprolol succinate 50 mg oral tablet, extended release: 1 tab(s) orally once a day  Milk of Magnesia 8% oral suspension: 30 milliliter(s) orally once a day (at bedtime), As Needed  multivitamin: 1 tab(s) orally once a day  ondansetron 4 mg oral tablet: 1 tab(s) orally every 8 hours, As Needed - for nausea  oxyCODONE 10 mg oral tablet: 1 tab(s) orally every 6 hours, As Needed  pantoprazole 40 mg oral delayed release tablet: 1 tab(s) orally once a day  Senna 8.6 mg oral tablet: 2 tab(s) orally once a day (at bedtime)  sucralfate 1 g oral tablet: 1 tab(s) orally 4 times a day (before meals and at bedtime)  traMADol 50 mg oral tablet: 0.5 tab(s) orally every 6 hours, As Needed  Tylenol 325 mg oral tablet: 2 tab(s) orally every 6 hours, As Needed  Vitamin C 1000 mg oral tablet: 1 tab(s) orally once a day

## 2020-06-24 NOTE — PROGRESS NOTE ADULT - SUBJECTIVE AND OBJECTIVE BOX
[facility] -- Bolivar Carbajal Grossman, [place], Reynaldo Sweeney Savella, Goodger: Office # [phone]    Follow Up:  Afib/ left foot infection bleeding       Subjective/Observations: Patient seen and examined. Patient awake and alert, resting comfortably in bed. No complaints of chest pain, SOB, LE edema, cough. No signs of orthopnea or PND. No bleeding from foot at present  Plan for d/c to [facility]    REVIEW OF SYSTEMS: All review of systems is negative for eye, ENT, GI, , allergic, dermatologic, musculoskeletal and neurologic except as described above    PAST MEDICAL & SURGICAL HISTORY:  Cerebrovascular accident  CAD S/P percutaneous coronary angioplasty  Osteomyelitis: s/p debridement  History of non-ST elevation myocardial infarction (NSTEMI)  Pulmonary embolism  Perforated gastric ulcer: s/p emergent ex-lap omentopexy and plication 6/2019  BPH (benign prostatic hyperplasia)  Hypertension  Hypertension  Afib  Diabetes mellitus with no complication  Diabetes  Traumatic amputation of left foot, initial encounter  Perforated gastric ulcer  H/O abdominal surgery    MEDICATIONS  (STANDING):  amoxicillin  875 milliGRAM(s)/clavulanate 1 Tablet(s) Oral two times a day  ascorbic acid 1000 milliGRAM(s) Oral daily  atorvastatin 40 milliGRAM(s) Oral at bedtime  bethanechol 25 milliGRAM(s) Oral three times a day  dextrose 5%. 1000 milliLiter(s) (50 mL/Hr) IV Continuous <Continuous>  dextrose 50% Injectable 12.5 Gram(s) IV Push once  dextrose 50% Injectable 25 Gram(s) IV Push once  dextrose 50% Injectable 25 Gram(s) IV Push once  enoxaparin Injectable 100 milliGRAM(s) SubCutaneous every 12 hours  famotidine    Tablet 20 milliGRAM(s) Oral every 24 hours  ferrous    sulfate 325 milliGRAM(s) Oral two times a day  gabapentin 100 milliGRAM(s) Oral at bedtime  insulin glargine Injectable (LANTUS) 15 Unit(s) SubCutaneous at bedtime  insulin lispro (HumaLOG) corrective regimen sliding scale   SubCutaneous three times a day before meals  insulin lispro (HumaLOG) corrective regimen sliding scale   SubCutaneous at bedtime  insulin lispro Injectable (HumaLOG) 5 Unit(s) SubCutaneous three times a day with meals  losartan 25 milliGRAM(s) Oral daily  methimazole 10 milliGRAM(s) Oral daily  metoprolol tartrate 50 milliGRAM(s) Oral every 12 hours  multivitamin 1 Tablet(s) Oral daily  pantoprazole    Tablet 40 milliGRAM(s) Oral before breakfast  phenazopyridine 100 milliGRAM(s) Oral every 8 hours  potassium chloride    Tablet ER 10 milliEquivalent(s) Oral daily  senna 2 Tablet(s) Oral at bedtime  sucralfate 1 Gram(s) Oral four times a day  tamsulosin 0.8 milliGRAM(s) Oral at bedtime    MEDICATIONS  (PRN):  acetaminophen   Tablet .. 650 milliGRAM(s) Oral every 6 hours PRN Temp greater or equal to 38C (100.4F), Mild Pain (1 - 3)  bisacodyl Suppository 10 milliGRAM(s) Rectal daily PRN Constipation  dextrose 40% Gel 15 Gram(s) Oral once PRN Blood Glucose LESS THAN 70 milliGRAM(s)/deciliter  glucagon  Injectable 1 milliGRAM(s) IntraMuscular once PRN Glucose LESS THAN 70 milligrams/deciliter  morphine  - Injectable 2 milliGRAM(s) IV Push every 6 hours PRN breakthrough pain  ondansetron Injectable 4 milliGRAM(s) IV Push once PRN Nausea and/or Vomiting  oxyCODONE    IR 5 milliGRAM(s) Oral every 6 hours PRN Severe Pain (7 - 10)  traMADol 25 milliGRAM(s) Oral every 6 hours PRN Moderate Pain (4 - 6)    Allergies  fish (Hives)  No Known Drug Allergies    Vital Signs Last 24 Hrs  T(C): 36.4 (24 Jun 2020 04:52), Max: 37.5 (23 Jun 2020 13:47)  T(F): 97.5 (24 Jun 2020 04:52), Max: 99.5 (23 Jun 2020 13:47)  HR: 52 (24 Jun 2020 04:52) (52 - 67)  BP: 120/73 (24 Jun 2020 04:52) (107/62 - 134/71)  BP(mean): --  RR: 18 (24 Jun 2020 04:52) (17 - 18)  SpO2: 94% (24 Jun 2020 04:52) (94% - 97%)    I&O's Summary    23 Jun 2020 07:01  -  24 Jun 2020 07:00  --------------------------------------------------------  IN: 0 mL / OUT: 4800 mL / NET: -4800 mL    TELE: Not on telemetry   PHYSICAL EXAM:  Appearance: NAD, no distress, alert, Well developed   HEENT: Moist Mucous Membranes, Anicteric  Cardiovascular: Regular rate and rhythm, Normal S1 S2, No JVD, No murmurs, No rubs, gallops or clicks  Respiratory: Non-labored, Clear to auscultation, No rales, No rhonchi, No wheezing.   Gastrointestinal:  Soft, Non-tender, + BS  Neurologic: Non-focal  Skin: Warm and dry, + Lt foot DSD, No ecchymosis, No cyanosis  Musculoskeletal: No clubbing, No cyanosis, No joint swelling/tenderness  Psychiatry: Mood & affect appropriate  Lymph: No peripheral edema.     LABS: All Labs Reviewed:                        10.0   5.36  )-----------( 349      ( 24 Jun 2020 09:07 )             32.9                         9.9    6.14  )-----------( 370      ( 23 Jun 2020 09:22 )             32.2                         9.3    6.87  )-----------( 367      ( 22 Jun 2020 09:17 )             30.0     24 Jun 2020 09:13    141    |  107    |  8      ----------------------------<  122    3.9     |  26     |  0.85   22 Jun 2020 09:13    141    |  107    |  10     ----------------------------<  139    3.6     |  27     |  0.83     Ca    8.9        24 Jun 2020 09:13  Ca    8.6        22 Jun 2020 09:13  Triiodothyronine, Total (T3 Total): 68 ng/dL (06-18-20 @ 11:01)    12 Lead ECG:   Ventricular Rate 61 BPM  Atrial Rate 61 BPM  P-R Interval 146 ms  QRS Duration 82 ms  Q-T Interval 416 ms  QTC Calculation(Amador) 418 ms  P Axis 42 degrees  R Axis 19 degrees  T Axis 54 degrees  Diagnosis Line Normal sinus rhythm  Normal ECG  When compared with ECG of 16-JUN-2020 11:37,  No significant change was found  Confirmed by DAT CALLAHAN ([id number]) on 6/17/2020 4:51:28 PM (06-17-20 @ 08:18)    < from: TTE Echo Doppler w/o Cont (12.24.19 @ 13:55) >  Dimensions:    LA 4.2       Normal Values: 2.0 - 4.0 cm    Ao 4.0        Normal Values: 2.0 - 3.8 cm  SEPTUM 1.6       Normal Values: 0.6 - 1.2 cm  PWT 1.6       Normal Values: 0.6 - 1.1 cm  LVIDd 5.8         Normal Values: 3.0 - 5.6 cm  LVIDs 3.2         Normal Values: 1.8 - 4.0 cm    Derived Variables:  LVMI g/m2  RWT  Fractional Short  Ejection Fraction    Doppler Peak v. AoV= (m/sec)    OBSERVATIONS:    Mitral Valve: normal, mild MR.  Aortic Valve/Aorta: normal trileaflet aortic valve.  Tricuspid Valve: normal with trace TR.  Pulmonic Valve: normal  Left Atrium: Enlarged  Right Atrium: normal  Left Ventricle: Severe concentric LVH with normal systolic function, estimated LVEF of 65%.  Right Ventricle: normal size and systolic function.  Pericardium/Pleura: no significant pleural effusion, no significant pericardial effusion.  Pulmonary/RV Pressure: Inadequate TR jet to estimate PA systolic pressure  LV Diastolic Function: Grade 2diastolic dysfunction.    Severe concentric LVH, and mild LV enlargement, with normal systolic function, estimated LVEF of 65%. Normal right ventricular size and systolic function. Grade 2diastolic dysfunction. Left atrial enlargement The aortic root is normal in size. The mitral valve is structurally normal, mild MR. The aortic valve is trileaflet without stenosis or insufficiency. Trace physiologic TR. Inadequate TR jet to estimate PA systolic pressure No significant pericardial effusion.        < end of copied text >    < from: Cardiac Cath Lab - Adult (08.02.19 @ 08:55) >  VENTRICLES: No left ventriculogram was performed.  CORONARY VESSELS: The coronary circulation is right dominant.  RCA:   --  Proximal RCA: There was a 95 % stenosis.  COMPLICATIONS: There were no complications.  INTERVENTIONAL IMPRESSIONS: Successful stenting of the RCA with BMS  INTERVENTIONAL RECOMMENDATIONS: Aspirin for 1 week. Plavix for 3 weeks. Eliquis for PE (duration TBD) - limit triple therapy for 1 week then continue with plavix eliquis for 3 weeks. This modified DAPT regimen is due to his recent perforated ulcer and surgery.    < end of copied text >    < from: Cardiac Cath Lab - Adult (08.01.19 @ 14:11) >  (Isoptin, Calan, Covera), 2.5 mg, IA. Heparin, 3000 units, IA.  VENTRICLES: No left ventriculogram was performed.  CORONARY VESSELS: The coronary circulation is right dominant.  LM:   --  LM: Normal.  LAD:   --  Mid LAD: There was a tubular 30 % stenosis.  --  D1: There was a tubular 40 % stenosis.  CX:   --  Circumflex: Angiography showed minor luminal irregularities with no flow limiting lesions.  --  OM1: Angiography showed minor luminal irregularities with no flow limiting lesions.  --  OM2: Angiography showed minor luminal irregularities with no flow limiting lesions.  RCA:   --  Proximal RCA: There was a 95 % stenosis.  --  RPLS: There was a 100 % stenosis.  COMPLICATIONS: There were no complications.  DIAGNOSTIC IMPRESSIONS: Severe RCA disease (95% prox disease, occluded  RPL). Mild LCx and Mild LAD disease.  DIAGNOSTIC RECOMMENDATIONS: Will review with GI/Surgery risk of triple therapy prior to proceeding with PCI    < end of copied text >    < from: Xray Chest 1 View AP/PA (06.16.20 @ 10:04) >  FINDINGS:  Lungs:There are no lung consolidations. Small left pleural effusion.  Heart: The heart is normal in size.  Mediastinum: The mediastinum is within normal limits.    IMPRESSION:  No lung consolidations.  Small left pleural effusion.    < end of copied text >

## 2020-06-24 NOTE — PROGRESS NOTE ADULT - ATTENDING COMMENTS
Chart reviewed    Patient seen and examined    Agree with plan as outlined above
Chart reviewed    Patient seen and examined    Agree with plan as outlined above
I personally saw and examined the patient in detail.  I have spoken to the above provider regarding the assessment and plan of care.  I reviewed the above assessment and plan of care, and agree.  I have made changes in the body of the note where appropriate.
I personally saw and examined the patient in detail.  I have spoken to the above provider regarding the assessment and plan of care.  I reviewed the above assessment and plan of care, and agree.  I have made changes in the body of the note where appropriate.
I saw and examined the patient personally. Spoke with above provider regarding this case. I reviewed the above findings completely.  I agree with the above history, physical, and plan which I have edited where appropriate.
The patient was personally seen and examined, in addition to being examined and evaluated by NP.  All elements of the note were edited where appropriate.
Chart reviewed    Patient seen and examined    Agree with plan as outlined above
48 y/o male with recent L TMA admitted with surgical site bleeding after undergoing wound care, along with acute blood loss anemia and hypotension.   S/p TMA revision and control of bleeding in the OR 6/16  S/p 3 PRBCs, 1 Plt, 1 FFP and Kcentra     Hx A.fib on apixaban, CAD on aspirin, DM2, HTN, urinary retention with chronic Aguilera     Clinically stable today  BP stable     Plan:   Wound care per podiatry   Transfuse 1 more PRBC today   Resume apixaban and aspirin when ok with podiatry   On Ancef for hx MSSA wound infection   Abnormal UA, send UCx, WBC and temp curve is improving  ID following   Aguilera changed 6/16  D/c IVF   OOB when ok with podiatry     Stable for medicine

## 2020-06-24 NOTE — PROGRESS NOTE ADULT - REASON FOR ADMISSION
left foot infection/bleeding

## 2020-06-24 NOTE — PROGRESS NOTE ADULT - ASSESSMENT
49M PMH CAD s/p BMS to RCA (8/2019), RUL subsegmental PE (6/2019, on Eliquis), hx of NSTEMI and PAF s/p ex-lap omentopexy and plication for perforated antral ulcer (5/2019),  CVA s/p tPA (8/2019), HTN and DM2, osteomyelitis, s/p debridement, s/p left TMA 5/2020, without complication p/w bleeding from wound. In the ER his BP was initially marginal.  He dropped his BP to 90/40, and had two episodes of bleeding from the wound, which was wrapped with a pressure dressing, he then developed chest pain. - Currently CP free.      CAD  - Patient with known CAD, has BMS to prox RCA 8/2019, and an occluded RPLs not intervened upon. Pt known severe LVH with normal LVEF   - Echo 12/24 as delineated above revealing severe LVH, normal systolic function ef 65% grade 2 diastolic dysfunction, mild MR, trace TR   - Currently off of all antiplatelets  - Resume ASA when able given PCI.   - There is no evidence of acute ischemia or overload   - Continue losartan, metoprolol  - Continue statin	    Atrial Fibrillation   - Continue Lovenox 100mg Q12hrs.  Switch to NOAC on discharge   - Continue BB    Hypertension  - BP: 120/73 (06-24-20 @ 04:52) (107/62 - 134/71)  - Continue losartan, metoprolol  - Monitor routine hemodynamics     Anemia   - Hemoglobin: 10.0 (06-24-20 @ 09:07) Hematocrit: 32.9 (06-24-20 @ 09:07)  - Per primary   - Transfuse prn with PRBC to keep Hb >8 given CAD    Osteomyelitis Foot  - Per primary/podiatry   - Follow ID recommendations.     - Patient stable for discharge from cardiac standpoint   - Monitor and replete lytes, keep K>4, Mg>2.  - All other medical needs as per primary team.  - Other cardiovascular workup will depend on clinical course.  - Will continue to follow.      Danny Arce, MS FNP, [facility]  Nurse Practitioner- Cardiology   Spectra #[phone]

## 2020-06-24 NOTE — PROGRESS NOTE ADULT - ASSESSMENT
IMPRESSION:  49/m with multiple co morbid medical history, including hx of cad, s/p mi, h/o pe, h/o a.fibb, h/o abd surgery for perforated gastric ulcer in past who had left foot TMA surgery around 5/20/2020, was admitted around 6/16 for pain, bleeding and redness at wound site, seen by podiatry and underwent revision left foot TMA 6/16/2020 (estimated 300 ml blood loss per surgery), had 3 units prbc , 1 unit platelets and 1 unit ffp this admission,patient was admitted with mild anemia,  hb was around 10.5 this admission, getting treatment for possible OMleft foot infection as per id and is on abx as per id. hx a.fibb and cardiology is following    RECOMMENDATION:  Anemia--seems multifactorial, likely from blood loss/post surgery, blood loss from wound site with revision tma 6/16  iron profile--low iron, transferrin saturation, patient is s/p iv iron , po iron out patient--pt plan to make a final decision with his out patient pcp, refuses gi work up/invasive hem procedure  hb is at 10 today, hb was at 9.9 yesterday--h/h low but stable  no evidence of hemolysis, negative fobt  monitor h/h--transfuse prbc as needed for symptomatic anemia  h/o pe   hx of a.fibb--is on therapeutic dose lovenox  supportive care  case d/w pt at bedside and is in agreement

## 2020-06-24 NOTE — ADVANCED PRACTICE NURSE CONSULT - RECOMMEDATIONS
Type 2 A1c 6.6% s/p TMA revision  FU appt: [facility]  Diabetes wellness program offered/declined  Diabetes support group info given  Goal 100-180 mg/dL

## 2020-06-24 NOTE — OCCUPATIONAL THERAPY INITIAL EVALUATION ADULT - PERTINENT HX OF CURRENT PROBLEM, REHAB EVAL
49 y/o male admitted 6/16/2020 for left foot TMA wound and s/p revision of left transmetatarsal amputation 6/16/2020 due to foot infection. Patient s/p left foot transmetatarsal on 5/20/2020.

## 2020-06-24 NOTE — PROGRESS NOTE ADULT - NSHPATTENDINGPLANDISCUSS_GEN_ALL_CORE
attending
patient
patient and RN

## 2020-06-24 NOTE — ADVANCED PRACTICE NURSE CONSULT - ASSESSMENT
Vital Signs Last 24 Hrs  T(C): 36.4 (24 Jun 2020 04:52), Max: 37.5 (23 Jun 2020 13:47)  T(F): 97.5 (24 Jun 2020 04:52), Max: 99.5 (23 Jun 2020 13:47)  HR: 52 (24 Jun 2020 04:52) (52 - 67)  BP: 120/73 (24 Jun 2020 04:52) (107/62 - 134/71)  BP(mean): --  RR: 18 (24 Jun 2020 04:52) (17 - 18)  SpO2: 94% (24 Jun 2020 04:52) (94% - 97%)     eGFR if Non African American: 102 mL/min/1.73M2 (06-22-20 @ 09:13)  eGFR if : 118 mL/min/1.73M2 (06-22-20 @ 09:13)      CAPILLARY BLOOD GLUCOSE      POCT Blood Glucose.: 189 mg/dL (23 Jun 2020 21:01)  POCT Blood Glucose.: 146 mg/dL (23 Jun 2020 17:01)  POCT Blood Glucose.: 212 mg/dL (23 Jun 2020 11:50)      DIET: CC  %

## 2020-06-24 NOTE — DISCHARGE NOTE PROVIDER - PROVIDER TOKENS
PROVIDER:[TOKEN:[[id number]]],PROVIDER:[TOKEN:[[id number]]],PROVIDER:[TOKEN:[[id number]]]

## 2020-06-24 NOTE — GOALS OF CARE CONVERSATION - ADVANCED CARE PLANNING - CONVERSATION DETAILS
Offered pt assistance in completing HCP . He declined offer and states he has no one in his life to appoint .

## 2020-06-24 NOTE — PROGRESS NOTE ADULT - SUBJECTIVE AND OBJECTIVE BOX
49 y.o Male seen during AM podiatry rounds, s/p left foot TMA revision (DOS: 6/16/20). Patient relates no overnight events and denies any pain to the foot. Denies any fever, chills, nausea, vomiting, chest pain, shortness of breath, or calf pain at this time.    Vital Signs Last 24 Hrs  T(C): 36.4 (24 Jun 2020 04:52), Max: 37.5 (23 Jun 2020 13:47)  T(F): 97.5 (24 Jun 2020 04:52), Max: 99.5 (23 Jun 2020 13:47)  HR: 52 (24 Jun 2020 04:52) (52 - 67)  BP: 120/73 (24 Jun 2020 04:52) (107/62 - 134/71)  BP(mean): --  RR: 18 (24 Jun 2020 04:52) (17 - 18)  SpO2: 94% (24 Jun 2020 04:52) (94% - 97%)    PHYSICAL EXAM:  Vascular: DP & PT palpable, Digital hair absent. erythema and edema noted to the dorsal foot.  Dermatological: Incision site of the left foot noted with intact sutures to all but previously removed sutures from dorsal aspect, mild winston-incision erythema, no proximal streaking, no fluctuance, no malodor, + purulent drainage at dorsomedial incision. on dressing.  Neurological: Light touch sensation intact bilaterally  Musculoskeletal: s/p left TMA w/achilles tenotomy. POP with expression of drainage                          9.3    6.87  )-----------( 367      ( 22 Jun 2020 09:17 )             30.0       141  |  107  |  10  ----------------------------<  139<H>  3.6   |  27  |  0.83    Ca    8.6      22 Jun 2020 09:13        Culture - Urine (06.17.20 @ 18:51)    -  Tobramycin: S <=4    -  Tobramycin: S <=4    -  Gentamicin: S <=4    -  Gentamicin: I 8    -  Imipenem: S 2    -  Imipenem: S 2    -  Levofloxacin: R >4    -  Levofloxacin: R >4    -  Meropenem: S <=1    -  Meropenem: S <=1    -  Piperacillin/Tazobactam: S <=16    -  Piperacillin/Tazobactam: S <=16    -  Amikacin: S <=16    -  Amikacin: I 32    -  Aztreonam: S <=4    -  Aztreonam: S 8    -  Cefepime: S <=4    -  Cefepime: S 8    -  Ceftazidime: S 4    -  Ceftazidime: S 4    -  Ciprofloxacin: R >2    -  Ciprofloxacin: R >2    Specimen Source: .Urine Catheterized    Culture Results:   50,000 - 99,000 CFU/mL Pseudomonas aeruginosa  50,000 - 99,000 CFU/mL Pseudomonas aeruginosa #2  Multiple Morphological Strains    Organism Identification: Pseudomonas aeruginosa  Pseudomonas aeruginosa    Organism: Pseudomonas aeruginosa    Organism: Pseudomonas aeruginosa    Method Type: BAYLEE    Method Type: BAYLEE    Culture - Blood (06.16.20 @ 16:36)    Gram Stain:   Growth in anaerobic bottle: Gram Positive Cocci in Clusters    -  Multidrug (KPC pos) resistant organism: Nondet    -  Staphylococcus aureus: Nondet    -  Methicillin resistant Staphylococcus aureus (MRSA): Nondet    -  Coagulase negative Staphylococcus: Nondet    -  Enterococcus species: Nondet    -  Vancomycin resistant Enterococcus sp.: Nondet    -  Escherichia coli: Nondet    -  Klebsiella oxytoca: Nondet    -  Klebsiella pneumoniae: Nondet    -  Serratia marcescens: Nondet    -  Haemophilus influenzae: Nondet    -  Listeria monocytogenes: Nondet    -  Neisseria meningitidis: Nondet    -  Pseudomonas aeruginosa: Nondet    -  Acinetobacter baumanii: Nondet    -  Enterobacter cloacae complex: Nondet    -  Streptococcus sp. (Not Grp A, B or S pneumoniae): Nondet    -  Streptococcus agalactiae (Group B): Nondet    -  Streptococcus pyogenes (Group A): Nondet    -  Streptococcus pneumoniae: Nondet    -  Candida albicans: Nondet    -  Candida glabrata: Nondet    -  Candida krusei: Nondet    -  Candida parapsilosis: Nondet    -  Candida tropicalis: Nondet    Specimen Source: .Blood Blood-Peripheral    Organism: Blood Culture PCR    Culture Results:   Growth in anaerobic bottle: Gram Positive Cocci in Clusters  "Due to technical problems, Proteus sp. will Not be reported as part of  the BCID panel until further notice"  ***Blood Panel PCR results on this specimen are available  approximately 3 hours after the Gram stain result.***  Gram stain, PCR, and/or culture results may not always  correspond due to difference in methodologies.  ************************************************************  This PCR assay was performed using [employer].  The following targets are tested for: Enterococcus,  vancomycin resistant enterococci, Listeria monocytogenes,  coagulase negative staphylococci, S. aureus,  methicillin resistant S. aureus, Streptococcus agalactiae  (Group B), S. pneumoniae, S. pyogenes (Group A),  Acinetobacter baumannii, Enterobacter cloacae, E. coli,  Klebsiella oxytoca, K. pneumoniae, Proteus sp.,  Serratia marcescens, Haemophilus influenzae,  Neisseria meningitidis, Pseudomonas aeruginosa, Candida  albicans, C. glabrata, C krusei, C parapsilosis,  C. tropicalis and the KPC resistance gene.    Organism Identification: Blood Culture PCR    Method Type: PCR    Culture - Tissue with Gram Stain (06.17.20 @ 01:59)    -  Ampicillin: S <=2 Predicts results to ampicillin/sulbactam, amoxacillin-clavulanate and  piperacillin-tazobactam.    -  Ampicillin: S <=2 Predicts results to ampicillin/sulbactam, amoxacillin-clavulanate and  piperacillin-tazobactam.    -  Ampicillin/Sulbactam: S <=8/4    Gram Stain:   Rare polymorphonuclear leukocytes seen per low power field  No organisms seen per oil power field    -  Clindamycin: S <=0.25    -  Erythromycin: S <=0.25    -  Cefazolin: S <=4    -  Trimethoprim/Sulfamethoxazole: S <=0.5/9.5    -  Vancomycin: S 2    -  Vancomycin: S 0.5  Surgical Pathology Report (06.16.20 @ 20:37)    Surgical Pathology Report:   ACCESSION No:  [id number]    SARAH MORRISON                     2        Surgical Final Report          Final Diagnosis    1. Bone, metatarsals, excision:  Fragments of bone showing acute osteomyelitis and focal  mild chronic  osteomyelitis.  Active bone remodeling with new bone formation and focal  marrow fibrosis.    2. Bone, metatarsals, clean margin:  Bone, with adjacent soft tissue, showing mild chronic  osteomyelitis accompanied  by active bone remodeling with new bone formation and focal  marrow  fibrosis.    Verified by: MARIA A SERRATO  (Electronic Signature)  Reported on: 06/19/20 13:05 EDT, [facility], [address] [place] [address]  Phone: [phone]   Fax: [phone]  _________________________________________________________________    Clinical History  Left foot stump osteomyelitis  Procedure: Left TMA site revision    Specimen(s) Submitted  1-Left metatarsals bone  2-Left metatarsals bone, clean margin    Gross Description  1. The specimen is received in formalin and the specimen  container is labeled: Left metatarsals bone pathology. It  consists of four ovoid pieces of tan-yellow bony tissue with  smooth surgical cuts and attached gray-tan soft tissue measuring  in aggregate 3.0 x 2.5x 0.7 cm. Each piece is sectioned. The  specimen is entirely submitted following decalcification. Four  cassettes (one piece per cassette).    2. The specimen is received in formalin and the specimen  container is labeled: Left metatarsals bone cleanmargin  pathology. It consists of three pieces of tan-yellow bony tissue  with smooth surgical cuts measuring in aggregate 2.0 x 1.7 x 0.2  cm. Entirely submitted following decalcification. One cassette.                SARAH MORRISON      2        Surgical Final Report          In addition to other data that may appear on the specimen  containers, all labels have been inspected to confirm the  presence of the patient's name and date of birth.  MELISSA Crane ([facility]) 6/18/2020 12:18 PM    Perioperative Diagnosis  Left foot stump osteomyelitis    Postoperative Diagnosis  Same      -  Vancomycin: S 2    -  RIF- Rifampin: S <=1 Should not be used as monotherapy    -  Tetra/Doxy: S <=1    -  Tetra/Doxy: R >8    -  Tetra/Doxy: R >8    -  Penicillin: R >8    -  Oxacillin: S 0.5    -  Gentamicin: S <=1 Should not be used as monotherapy    Specimen Source: .Tissue Other, left metatarsal bone    Culture Results:   Rare Staphylococcus aureus  Rare Enterococcus avium  Rare Enterococcus faecalis    Organism Identification: Staphylococcus aureus  Enterococcus avium  Enterococcus faecalis    Organism: Enterococcus avium    Organism: Enterococcus faecalis    Organism: Staphylococcus aureus    Method Type: BAYLEE    Method Type: BAYLEE    Method Type: BAYLEE                  Imaging: left foot postop radiographs reveal s/p TMA

## 2020-06-24 NOTE — DISCHARGE NOTE PROVIDER - HOSPITAL COURSE
admitted for left TMA wound dehisence with cellulitis    ABX per ID    underwent TMA revision    PT for ambulation    anemia from blood loss    PRBC transfusion given    DC after Podiatry and ID clearance

## 2020-06-24 NOTE — DISCHARGE NOTE PROVIDER - CARE PROVIDERS DIRECT ADDRESSES
[email or web address]

## 2020-06-24 NOTE — OCCUPATIONAL THERAPY INITIAL EVALUATION ADULT - PRECAUTIONS/LIMITATIONS, REHAB EVAL
fall precautions IMPROVE VTE Individual Risk Assessment    RISK                                                          Points  [] Previous VTE                                           3  [] Thrombophilia                                        2  [] Lower limb paralysis                              2   [] Current Cancer                                       2   [x] Immobilization > 24 hrs                        1  [] ICU/CCU stay > 24 hours                       1  [x] Age > 60                                                   1    IMPROVE VTE Score: 2, DVT PPx w/ Lovenox Holding home antidiabetic medications; c/w diabetic diet and ISS   A1C 6,0  -monitor FS

## 2020-06-24 NOTE — OCCUPATIONAL THERAPY INITIAL EVALUATION ADULT - ADDITIONAL COMMENTS
Pt reports that prior to left transmetatarsal amputation May 2020, patient was living with friends in a house with 5 steps with handrail to enter and 13 steps with handrail to bedroom. +bathtub with curtain and grab bar. Pt was ambulating using a rolling walker. Pt owns a rolling walker and tub bench. Pt is left hand dominant.

## 2020-06-24 NOTE — DISCHARGE NOTE PROVIDER - CARE PROVIDER_API CALL
Jimbo Hale  PODIATRIC MEDICINE AND SURGERY  [address] [place] [address]  Phone: [phone]  Fax: [phone]  Follow Up Time:     Grisel Murray  INTERNAL MEDICINE  [address] [place] [address]  Phone: [phone]  Fax: [phone]  Follow Up Time:     Juventino Whitten  INFECTIOUS DISEASE  [address] [place] [address]  Phone: [phone]  Fax: [phone]  Follow Up Time:

## 2020-06-24 NOTE — ADVANCED PRACTICE NURSE CONSULT - REASON FOR CONSULT
50y    Male    Patient is a 50y old  Male who presents with a chief complaint of left foot infection/bleeding (24 Jun 2020 07:14)      Type:2 previously seen May 2020 a1c 11% now 6.6%. Plan to DSC to [facility] for rehab. Patient states has no needs for insulin/supplies. Foot care handout given.     HPI:  49year old Male seen at [facility] ED for left foot TMA wound. Pt was seen in wound care by Dr. Hale today for his left foot TMA (DOS 5.20.2020) Pt relates he went to [facility] last week for the wound and states that the wound was packed and redressed. Pt presented today and wound was painful and foul smelling.     Denies any fever, chills, nausea, vomiting, chest pain, shortness of breath, or calf pain at this time. (16 Jun 2020 12:13)      PAST MEDICAL & SURGICAL HISTORY:  Cerebrovascular accident  CAD S/P percutaneous coronary angioplasty  Osteomyelitis: s/p debridement  History of non-ST elevation myocardial infarction (NSTEMI)  Pulmonary embolism  Perforated gastric ulcer: s/p emergent ex-lap omentopexy and plication 6/2019  BPH (benign prostatic hyperplasia)  Hypertension  Afib  Diabetes mellitus with no complication  Diabetes  Traumatic amputation of left foot, initial encounter  Perforated gastric ulcer  H/O abdominal surgery      MEDICATIONS  (STANDING):  amoxicillin  875 milliGRAM(s)/clavulanate 1 Tablet(s) Oral two times a day  ascorbic acid 1000 milliGRAM(s) Oral daily  atorvastatin 40 milliGRAM(s) Oral at bedtime  bethanechol 25 milliGRAM(s) Oral three times a day  dextrose 5%. 1000 milliLiter(s) (50 mL/Hr) IV Continuous <Continuous>  dextrose 50% Injectable 12.5 Gram(s) IV Push once  dextrose 50% Injectable 25 Gram(s) IV Push once  dextrose 50% Injectable 25 Gram(s) IV Push once  enoxaparin Injectable 100 milliGRAM(s) SubCutaneous every 12 hours  famotidine    Tablet 20 milliGRAM(s) Oral every 24 hours  ferrous    sulfate 325 milliGRAM(s) Oral two times a day  gabapentin 100 milliGRAM(s) Oral at bedtime  insulin glargine Injectable (LANTUS) 15 Unit(s) SubCutaneous at bedtime  insulin lispro (HumaLOG) corrective regimen sliding scale   SubCutaneous three times a day before meals  insulin lispro (HumaLOG) corrective regimen sliding scale   SubCutaneous at bedtime  insulin lispro Injectable (HumaLOG) 5 Unit(s) SubCutaneous three times a day with meals  losartan 25 milliGRAM(s) Oral daily  methimazole 10 milliGRAM(s) Oral daily  metoprolol tartrate 50 milliGRAM(s) Oral every 12 hours  multivitamin 1 Tablet(s) Oral daily  pantoprazole    Tablet 40 milliGRAM(s) Oral before breakfast  phenazopyridine 100 milliGRAM(s) Oral every 8 hours  potassium chloride    Tablet ER 10 milliEquivalent(s) Oral daily  senna 2 Tablet(s) Oral at bedtime  sucralfate 1 Gram(s) Oral four times a day  tamsulosin 0.8 milliGRAM(s) Oral at bedtime

## 2020-06-24 NOTE — DISCHARGE NOTE NURSING/CASE MANAGEMENT/SOCIAL WORK - PATIENT PORTAL LINK FT
You can access the FollowMyHealth Patient Portal offered by [facility] by registering at the following website: [email or web address]. By joining [employer]’s FollowMyHealth portal, you will also be able to view your health information using other applications (apps) compatible with our system.

## 2020-06-24 NOTE — PROGRESS NOTE ADULT - PROBLEM SELECTOR PROBLEM 1
Diabetes mellitus with no complication
Diabetes mellitus with no complication
Foot infection
Osteomyelitis

## 2020-06-24 NOTE — PROGRESS NOTE ADULT - SUBJECTIVE AND OBJECTIVE BOX
Patient is a 50y old  Male who presents with a chief complaint of left foot infection/bleeding (24 Jun 2020 10:55)       Pt is seen and examined  pt is awake and lying in bed/out of bed to chair  pt seems comfortable and denies any complaints at this time    HPI:  49year old Male seen at [facility] ED for left foot TMA wound. Pt was seen in wound care by Dr. Hale today for his left foot TMA (DOS 5.20.2020) Pt relates he went to [facility] last week for the wound and states that the wound was packed and redressed. Pt presented today and wound was painful and foul smelling.     Denies any fever, chills, nausea, vomiting, chest pain, shortness of breath, or calf pain at this time. (16 Jun 2020 12:13)         ROS:  Negative except for:    MEDICATIONS  (STANDING):  amoxicillin  875 milliGRAM(s)/clavulanate 1 Tablet(s) Oral two times a day  ascorbic acid 1000 milliGRAM(s) Oral daily  atorvastatin 40 milliGRAM(s) Oral at bedtime  bethanechol 25 milliGRAM(s) Oral three times a day  dextrose 5%. 1000 milliLiter(s) (50 mL/Hr) IV Continuous <Continuous>  dextrose 50% Injectable 12.5 Gram(s) IV Push once  dextrose 50% Injectable 25 Gram(s) IV Push once  dextrose 50% Injectable 25 Gram(s) IV Push once  enoxaparin Injectable 100 milliGRAM(s) SubCutaneous every 12 hours  famotidine    Tablet 20 milliGRAM(s) Oral every 24 hours  ferrous    sulfate 325 milliGRAM(s) Oral two times a day  gabapentin 100 milliGRAM(s) Oral at bedtime  insulin glargine Injectable (LANTUS) 15 Unit(s) SubCutaneous at bedtime  insulin lispro (HumaLOG) corrective regimen sliding scale   SubCutaneous three times a day before meals  insulin lispro (HumaLOG) corrective regimen sliding scale   SubCutaneous at bedtime  insulin lispro Injectable (HumaLOG) 5 Unit(s) SubCutaneous three times a day with meals  losartan 25 milliGRAM(s) Oral daily  methimazole 10 milliGRAM(s) Oral daily  metoprolol tartrate 50 milliGRAM(s) Oral every 12 hours  multivitamin 1 Tablet(s) Oral daily  pantoprazole    Tablet 40 milliGRAM(s) Oral before breakfast  phenazopyridine 100 milliGRAM(s) Oral every 8 hours  potassium chloride    Tablet ER 10 milliEquivalent(s) Oral daily  senna 2 Tablet(s) Oral at bedtime  sucralfate 1 Gram(s) Oral four times a day  tamsulosin 0.8 milliGRAM(s) Oral at bedtime    MEDICATIONS  (PRN):  acetaminophen   Tablet .. 650 milliGRAM(s) Oral every 6 hours PRN Temp greater or equal to 38C (100.4F), Mild Pain (1 - 3)  bisacodyl Suppository 10 milliGRAM(s) Rectal daily PRN Constipation  dextrose 40% Gel 15 Gram(s) Oral once PRN Blood Glucose LESS THAN 70 milliGRAM(s)/deciliter  glucagon  Injectable 1 milliGRAM(s) IntraMuscular once PRN Glucose LESS THAN 70 milligrams/deciliter  morphine  - Injectable 2 milliGRAM(s) IV Push every 6 hours PRN breakthrough pain  ondansetron Injectable 4 milliGRAM(s) IV Push once PRN Nausea and/or Vomiting  oxyCODONE    IR 5 milliGRAM(s) Oral every 6 hours PRN Severe Pain (7 - 10)  traMADol 25 milliGRAM(s) Oral every 6 hours PRN Moderate Pain (4 - 6)      Allergies    fish (Hives)  No Known Drug Allergies    Intolerances        Vital Signs Last 24 Hrs  T(C): 36.4 (24 Jun 2020 04:52), Max: 37.5 (23 Jun 2020 13:47)  T(F): 97.5 (24 Jun 2020 04:52), Max: 99.5 (23 Jun 2020 13:47)  HR: 52 (24 Jun 2020 04:52) (52 - 67)  BP: 120/73 (24 Jun 2020 04:52) (107/62 - 134/71)  BP(mean): --  RR: 18 (24 Jun 2020 04:52) (17 - 18)  SpO2: 94% (24 Jun 2020 04:52) (94% - 97%)    PHYSICAL EXAM  General: adult in NAD  HEENT: clear oropharynx, anicteric sclera, pink conjunctiva  Neck: supple  CV: normal S1/S2 with no murmur rubs or gallops  Lungs: positive air movement b/l ant lungs,clear to auscultation, no wheezes, no rales  Abdomen: soft non-tender non-distended, no hepatosplenomegaly  Ext: no clubbing cyanosis or edema  Skin: no rashes and no petechiae  Neuro: alert and oriented X 4, no focal deficits  LABS:                          10.0   5.36  )-----------( 349      ( 24 Jun 2020 09:07 )             32.9         Mean Cell Volume : 94.3 fl  Mean Cell Hemoglobin : 28.7 pg  Mean Cell Hemoglobin Concentration : 30.4 gm/dL  Auto Neutrophil # : x  Auto Lymphocyte # : x  Auto Monocyte # : x  Auto Eosinophil # : x  Auto Basophil # : x  Auto Neutrophil % : x  Auto Lymphocyte % : x  Auto Monocyte % : x  Auto Eosinophil % : x  Auto Basophil % : x    Serial CBC's  06-24 @ 09:07  Hct-32.9 / Hgb-10.0 / Plat-349 / RBC-3.49 / WBC-5.36          Serial CBC's  06-23 @ 09:22  Hct-32.2 / Hgb-9.9 / Plat-370 / RBC-3.45 / WBC-6.14          Serial CBC's  06-22 @ 09:17  Hct-30.0 / Hgb-9.3 / Plat-367 / RBC-3.22 / WBC-6.87          Serial CBC's  06-21 @ 07:23  Hct-30.3 / Hgb-9.3 / Plat-370 / RBC-3.31 / WBC-8.32            06-24    141  |  107  |  8   ----------------------------<  122<H>  3.9   |  26  |  0.85    Ca    8.9      24 Jun 2020 09:13            Ferritin, Serum: 86 ng/mL (06-19-20 @ 01:18)  Vitamin B12, Serum: 342 pg/mL (06-19-20 @ 01:18)  Folate, Serum: 13.8 ng/mL (06-19-20 @ 01:18)  Iron - Total Binding Capacity.: 201 ug/dL (06-19-20 @ 01:13)  Reticulocyte Percent: 3.9 % (06-18-20 @ 16:16)                BLOOD SMEAR INTERPRETATION:       RADIOLOGY & ADDITIONAL STUDIES: Patient is a 50y old  Male who presents with a chief complaint of left foot infection/bleeding (24 Jun 2020 10:55)       Pt is seen and examined  pt is awake and lying in bed  pt seems comfortable and denies any complaints at this time    HPI:  49year old Male seen at [facility] ED for left foot TMA wound. Pt was seen in wound care by Dr. Hale today for his left foot TMA (DOS 5.20.2020) Pt relates he went to [facility] last week for the wound and states that the wound was packed and redressed. Pt presented today and wound was painful and foul smelling.     Denies any fever, chills, nausea, vomiting, chest pain, shortness of breath, or calf pain at this time. (16 Jun 2020 12:13)         ROS:  as per hpi    MEDICATIONS  (STANDING):  amoxicillin  875 milliGRAM(s)/clavulanate 1 Tablet(s) Oral two times a day  ascorbic acid 1000 milliGRAM(s) Oral daily  atorvastatin 40 milliGRAM(s) Oral at bedtime  bethanechol 25 milliGRAM(s) Oral three times a day  dextrose 5%. 1000 milliLiter(s) (50 mL/Hr) IV Continuous <Continuous>  dextrose 50% Injectable 12.5 Gram(s) IV Push once  dextrose 50% Injectable 25 Gram(s) IV Push once  dextrose 50% Injectable 25 Gram(s) IV Push once  enoxaparin Injectable 100 milliGRAM(s) SubCutaneous every 12 hours  famotidine    Tablet 20 milliGRAM(s) Oral every 24 hours  ferrous    sulfate 325 milliGRAM(s) Oral two times a day  gabapentin 100 milliGRAM(s) Oral at bedtime  insulin glargine Injectable (LANTUS) 15 Unit(s) SubCutaneous at bedtime  insulin lispro (HumaLOG) corrective regimen sliding scale   SubCutaneous three times a day before meals  insulin lispro (HumaLOG) corrective regimen sliding scale   SubCutaneous at bedtime  insulin lispro Injectable (HumaLOG) 5 Unit(s) SubCutaneous three times a day with meals  losartan 25 milliGRAM(s) Oral daily  methimazole 10 milliGRAM(s) Oral daily  metoprolol tartrate 50 milliGRAM(s) Oral every 12 hours  multivitamin 1 Tablet(s) Oral daily  pantoprazole    Tablet 40 milliGRAM(s) Oral before breakfast  phenazopyridine 100 milliGRAM(s) Oral every 8 hours  potassium chloride    Tablet ER 10 milliEquivalent(s) Oral daily  senna 2 Tablet(s) Oral at bedtime  sucralfate 1 Gram(s) Oral four times a day  tamsulosin 0.8 milliGRAM(s) Oral at bedtime    MEDICATIONS  (PRN):  acetaminophen   Tablet .. 650 milliGRAM(s) Oral every 6 hours PRN Temp greater or equal to 38C (100.4F), Mild Pain (1 - 3)  bisacodyl Suppository 10 milliGRAM(s) Rectal daily PRN Constipation  dextrose 40% Gel 15 Gram(s) Oral once PRN Blood Glucose LESS THAN 70 milliGRAM(s)/deciliter  glucagon  Injectable 1 milliGRAM(s) IntraMuscular once PRN Glucose LESS THAN 70 milligrams/deciliter  morphine  - Injectable 2 milliGRAM(s) IV Push every 6 hours PRN breakthrough pain  ondansetron Injectable 4 milliGRAM(s) IV Push once PRN Nausea and/or Vomiting  oxyCODONE    IR 5 milliGRAM(s) Oral every 6 hours PRN Severe Pain (7 - 10)  traMADol 25 milliGRAM(s) Oral every 6 hours PRN Moderate Pain (4 - 6)      Allergies    fish (Hives)  No Known Drug Allergies    Intolerances        Vital Signs Last 24 Hrs  T(C): 36.4 (24 Jun 2020 04:52), Max: 37.5 (23 Jun 2020 13:47)  T(F): 97.5 (24 Jun 2020 04:52), Max: 99.5 (23 Jun 2020 13:47)  HR: 52 (24 Jun 2020 04:52) (52 - 67)  BP: 120/73 (24 Jun 2020 04:52) (107/62 - 134/71)  BP(mean): --  RR: 18 (24 Jun 2020 04:52) (17 - 18)  SpO2: 94% (24 Jun 2020 04:52) (94% - 97%)    PHYSICAL EXAM  General: adult in NAD  HEENT: clear oropharynx, anicteric sclera, pink conjunctiva  Neck: supple  CV: normal S1/S2 with no murmur rubs or gallops  Lungs: positive air movement b/l ant lungs,clear to auscultation, no wheezes, no rales  Abdomen: soft non-tender non-distended, no hepatosplenomegaly  Ext: no clubbing cyanosis or edema  Skin: no rashes and no petechiae  Neuro: alert and oriented X 4, no focal deficits  LABS:                          10.0   5.36  )-----------( 349      ( 24 Jun 2020 09:07 )             32.9         Mean Cell Volume : 94.3 fl  Mean Cell Hemoglobin : 28.7 pg  Mean Cell Hemoglobin Concentration : 30.4 gm/dL  Auto Neutrophil # : x  Auto Lymphocyte # : x  Auto Monocyte # : x  Auto Eosinophil # : x  Auto Basophil # : x  Auto Neutrophil % : x  Auto Lymphocyte % : x  Auto Monocyte % : x  Auto Eosinophil % : x  Auto Basophil % : x    Serial CBC's  06-24 @ 09:07  Hct-32.9 / Hgb-10.0 / Plat-349 / RBC-3.49 / WBC-5.36          Serial CBC's  06-23 @ 09:22  Hct-32.2 / Hgb-9.9 / Plat-370 / RBC-3.45 / WBC-6.14          Serial CBC's  06-22 @ 09:17  Hct-30.0 / Hgb-9.3 / Plat-367 / RBC-3.22 / WBC-6.87          Serial CBC's  06-21 @ 07:23  Hct-30.3 / Hgb-9.3 / Plat-370 / RBC-3.31 / WBC-8.32            06-24    141  |  107  |  8   ----------------------------<  122<H>  3.9   |  26  |  0.85    Ca    8.9      24 Jun 2020 09:13            Ferritin, Serum: 86 ng/mL (06-19-20 @ 01:18)  Vitamin B12, Serum: 342 pg/mL (06-19-20 @ 01:18)  Folate, Serum: 13.8 ng/mL (06-19-20 @ 01:18)  Iron - Total Binding Capacity.: 201 ug/dL (06-19-20 @ 01:13)  Reticulocyte Percent: 3.9 % (06-18-20 @ 16:16)                BLOOD SMEAR INTERPRETATION:       RADIOLOGY & ADDITIONAL STUDIES:

## 2020-06-24 NOTE — PROGRESS NOTE ADULT - ASSESSMENT
Assessment: 49 y.o M s/p TMA Revision (DOS: 6/16/20)    Plan:  Patient examined and evaluated. Case discussed with Dr. Todd  Surgical site dressing removed with no evidence of mal odor.  Approximately --  cc of purulent drainage expressed from the wound dorsomedial aspect of incision and  Surgical site was then flushed with NS.  Surgical site was then dressed with DSD and ACE.  Patient is to be non weight bearing to the left lower extremity.  Antibiotics as per ID recommendations.  Pain Management per medical team    Pt is Pod stable for discharge.      Wound Care Instructions  1. Surgical dressing is to remain clean, dry and intact until follow up appt  2. Pt is to remain NWB to the left foot  3. Patient is to follow up in [facility] with Dr. Hale or Dr. Todd within 5 days upon discharge. Assessment: 49 y.o M s/p TMA Revision (DOS: 6/16/20)    Plan:  Patient examined and evaluated. Case discussed with Dr. Todd  Surgical site dressing removed with no evidence of mal odor.  Approximately 2 cc of serosangunious drainage expressed from the wound dorsomedial aspect of incision and  Surgical site was then flushed with NS.  Surgical site was then dressed with DSD and ACE.  Patient is to be non weight bearing to the left lower extremity.  Antibiotics as per ID recommendations.  Pain Management per medical team    Pt is Pod stable for discharge.      Wound Care Instructions  1. Surgical dressing is to remain clean, dry and intact until follow up appt  2. Pt is to remain NWB to the left foot  3. Patient is to follow up in the [facility] with Dr. Hale or Dr. Todd within 5 days upon discharge.

## 2020-06-24 NOTE — OCCUPATIONAL THERAPY INITIAL EVALUATION ADULT - NS ASR FOLLOW COMMAND OT EVAL
Remote transmission from 2/27/19 showing 14 HVR's.  Last EF was 2018 50%.     100% of the time/able to follow multistep instructions

## 2020-06-24 NOTE — DISCHARGE NOTE PROVIDER - NSDCCPCAREPLAN_GEN_ALL_CORE_FT
PRINCIPAL DISCHARGE DIAGNOSIS  Diagnosis: Foot infection  Assessment and Plan of Treatment: follow up with Dr. NULL

## 2020-07-08 NOTE — PROGRESS NOTE ADULT - PROBLEM/PLAN-9
Patient is a 36y Female who remains intubated  awake  NPO for GRAHAM     REVIEW OF SYSTEMS:    UTO, int    MEDICATIONS  (STANDING):  chlorhexidine 0.12% Liquid 15 milliLiter(s) Oral Mucosa every 12 hours  collagenase Ointment 1 Application(s) Topical daily  dextrose 5%. 1000 milliLiter(s) (50 mL/Hr) IV Continuous <Continuous>  dextrose 50% Injectable 12.5 Gram(s) IV Push once  dextrose 50% Injectable 25 Gram(s) IV Push once  dextrose 50% Injectable 25 Gram(s) IV Push once  folic acid 1 milliGRAM(s) Oral daily  heparin   Injectable 5000 Unit(s) SubCutaneous every 8 hours  melatonin 5 milliGRAM(s) Oral at bedtime  pantoprazole  Injectable 40 milliGRAM(s) IV Push every 12 hours  propofol Infusion 10 MICROgram(s)/kG/Min (3.26 mL/Hr) IV Continuous <Continuous>  silver sulfADIAZINE 1% Cream 1 Application(s) Topical daily  thiamine 100 milliGRAM(s) Oral daily  vancomycin  IVPB 500 milliGRAM(s) IV Intermittent daily    MEDICATIONS  (PRN):  acetaminophen   Tablet .. 650 milliGRAM(s) Oral every 4 hours PRN Temp greater or equal to 38C (100.4F), Mild Pain (1 - 3)  dextrose 40% Gel 15 Gram(s) Oral once PRN Blood Glucose LESS THAN 70 milliGRAM(s)/deciLiter  glucagon  Injectable 1 milliGRAM(s) IntraMuscular once PRN Glucose <70 milliGRAM(s)/deciLiter  hydrOXYzine hydrochloride 50 milliGRAM(s) Oral every 4 hours PRN Anxiety  loperamide 2 milliGRAM(s) Oral every 4 hours PRN Loose stool  LORazepam     Tablet 2 milliGRAM(s) Oral every 6 hours PRN Anxiety  oxyCODONE    IR 5 milliGRAM(s) Oral every 4 hours PRN Moderate Pain (4 - 6)      Vital Signs Last 24 Hrs  T(C): 37.8 (2020 22:03), Max: 38.3 (2020 14:06)  T(F): 100 (2020 22:03), Max: 100.9 (2020 14:06)  HR: 120 (2020 10:00) (105 - 135)  BP: 135/95 (2020 10:00) (115/78 - 135/95)  BP(mean): 104 (2020 10:00) (85 - 104)  RR: --  SpO2: 100% (2020 10:00) (91% - 100%)    I&O's Detail    2020 07:01  -  2020 07:00  --------------------------------------------------------  IN:    Free Water: 200 mL    Nepro: 70 mL    Oral Fluid: 120 mL    propofol Infusion: 397 mL    Solution: 100 mL  Total IN: 887 mL    OUT:    Incontinent per Collection Ba mL    Rectal Tube: 350 mL  Total OUT: 1450 mL    Total NET: -563 mL      PHYSICAL EXAM:    Constitutional: >then stated age  HEENT: dryMMM  Cardiovascular: S1 and S2  Extremities: No peripheral edema  Neurological: A/lert  Skin: No rashes  Access: Not applicable        LABS:                            7.1    14.15 )-----------( 389      ( 2020 06:28 )             22.7                         7.0    15.95 )-----------( 384      ( 2020 14:40 )             22.0         151    |  118    |  49     ----------------------------<  80        2020 07:45  3.8     |  23     |  0.98     148    |  115    |  58     ----------------------------<  87        2020 06:28  4.3     |  24     |  1.01     145    |  112    |  61     ----------------------------<  93        2020 14:30  2.7     |  24     |  0.98     Ca    7.6        2020 07:45  Ca    7.6        2020 06:28    Phos  4.3       2020 06:28  Phos  4.4       2020 14:30    Mg     2.1       2020 06:28    Urine Studies:          RADIOLOGY & ADDITIONAL STUDIES:
DISPLAY PLAN FREE TEXT

## 2020-07-09 ENCOUNTER — APPOINTMENT (OUTPATIENT)
Dept: WOUND CARE | Facility: HOSPITAL | Age: 50
End: 2020-07-09

## 2020-07-14 LAB
CULTURE RESULTS: SIGNIFICANT CHANGE UP
ORGANISM # SPEC MICROSCOPIC CNT: SIGNIFICANT CHANGE UP
ORGANISM # SPEC MICROSCOPIC CNT: SIGNIFICANT CHANGE UP
SPECIMEN SOURCE: SIGNIFICANT CHANGE UP

## 2020-07-15 ENCOUNTER — APPOINTMENT (OUTPATIENT)
Dept: PODIATRY | Facility: HOSPITAL | Age: 50
End: 2020-07-15
Payer: MEDICAID

## 2020-07-15 ENCOUNTER — OUTPATIENT (OUTPATIENT)
Dept: OUTPATIENT SERVICES | Facility: HOSPITAL | Age: 50
LOS: 1 days | Discharge: HOME | End: 2020-07-15
Payer: COMMERCIAL

## 2020-07-15 VITALS
HEIGHT: 74 IN | DIASTOLIC BLOOD PRESSURE: 70 MMHG | BODY MASS INDEX: 27.59 KG/M2 | WEIGHT: 215 LBS | HEART RATE: 58 BPM | SYSTOLIC BLOOD PRESSURE: 115 MMHG | TEMPERATURE: 97.8 F | OXYGEN SATURATION: 98 % | RESPIRATION RATE: 20 BRPM

## 2020-07-15 DIAGNOSIS — Z98.890 OTHER SPECIFIED POSTPROCEDURAL STATES: Chronic | ICD-10-CM

## 2020-07-15 DIAGNOSIS — S98.912A COMPLETE TRAUMATIC AMPUTATION OF LEFT FOOT, LEVEL UNSPECIFIED, INITIAL ENCOUNTER: Chronic | ICD-10-CM

## 2020-07-15 DIAGNOSIS — K25.5 CHRONIC OR UNSPECIFIED GASTRIC ULCER WITH PERFORATION: Chronic | ICD-10-CM

## 2020-07-15 DIAGNOSIS — E11.621 TYPE 2 DIABETES MELLITUS WITH FOOT ULCER: ICD-10-CM

## 2020-07-15 PROCEDURE — 99024 POSTOP FOLLOW-UP VISIT: CPT

## 2020-07-15 PROCEDURE — G0463: CPT

## 2020-07-16 DIAGNOSIS — E56.9 VITAMIN DEFICIENCY, UNSPECIFIED: ICD-10-CM

## 2020-07-16 DIAGNOSIS — Z87.440 PERSONAL HISTORY OF URINARY (TRACT) INFECTIONS: ICD-10-CM

## 2020-07-16 DIAGNOSIS — Z79.01 LONG TERM (CURRENT) USE OF ANTICOAGULANTS: ICD-10-CM

## 2020-07-16 DIAGNOSIS — L97.524 NON-PRESSURE CHRONIC ULCER OF OTHER PART OF LEFT FOOT WITH NECROSIS OF BONE: ICD-10-CM

## 2020-07-16 DIAGNOSIS — E11.40 TYPE 2 DIABETES MELLITUS WITH DIABETIC NEUROPATHY, UNSPECIFIED: ICD-10-CM

## 2020-07-16 DIAGNOSIS — Z79.82 LONG TERM (CURRENT) USE OF ASPIRIN: ICD-10-CM

## 2020-07-16 DIAGNOSIS — I25.2 OLD MYOCARDIAL INFARCTION: ICD-10-CM

## 2020-07-16 DIAGNOSIS — Z91.013 ALLERGY TO SEAFOOD: ICD-10-CM

## 2020-07-16 DIAGNOSIS — Z89.432 ACQUIRED ABSENCE OF LEFT FOOT: ICD-10-CM

## 2020-07-16 DIAGNOSIS — Z87.891 PERSONAL HISTORY OF NICOTINE DEPENDENCE: ICD-10-CM

## 2020-07-16 DIAGNOSIS — Z79.899 OTHER LONG TERM (CURRENT) DRUG THERAPY: ICD-10-CM

## 2020-07-16 DIAGNOSIS — N40.1 BENIGN PROSTATIC HYPERPLASIA WITH LOWER URINARY TRACT SYMPTOMS: ICD-10-CM

## 2020-07-16 DIAGNOSIS — I69.351 HEMIPLEGIA AND HEMIPARESIS FOLLOWING CEREBRAL INFARCTION AFFECTING RIGHT DOMINANT SIDE: ICD-10-CM

## 2020-07-16 DIAGNOSIS — E87.6 HYPOKALEMIA: ICD-10-CM

## 2020-07-16 DIAGNOSIS — E11.621 TYPE 2 DIABETES MELLITUS WITH FOOT ULCER: ICD-10-CM

## 2020-07-16 DIAGNOSIS — K59.00 CONSTIPATION, UNSPECIFIED: ICD-10-CM

## 2020-07-16 DIAGNOSIS — D64.9 ANEMIA, UNSPECIFIED: ICD-10-CM

## 2020-07-16 DIAGNOSIS — Z79.4 LONG TERM (CURRENT) USE OF INSULIN: ICD-10-CM

## 2020-07-16 NOTE — PHYSICAL EXAM
[4 x 4] : 4 x 4  [Abdominal Pad] : Abdominal Pad [JVD] : no jugular venous distention  [Ankle Swelling (On Exam)] : present [1+] : left 1+ [Ankle Swelling On The Left] : moderate [Skin Ulcer] : ulcer [] : not present [Varicose Veins Of Lower Extremities] : not present [Alert] : alert [Oriented to Person] : oriented to person [Oriented to Place] : oriented to place [Calm] : calm [de-identified] : htn, hld, diabetic cardiovascular disease [de-identified] : calm [de-identified] : s/p left TMA and Achilles tenotomy [de-identified] : left foot TMA ,  some sutures remove , healing well , no necrosis  [de-identified] : diminished light touch sensation [FreeTextEntry2] : 0.5 [FreeTextEntry1] : Left foot TMA site medial - Some sutures in place. Iodoform packing in place.  [FreeTextEntry4] : 0.1 [de-identified] : sanguinous [FreeTextEntry3] : 0.3 [de-identified] : Silver Alginate  [de-identified] : Expressed comfort post ACE application. [FreeTextEntry7] : Left foot TMA site lateral - Some sutures in place, Iodoform packing in place [de-identified] : Cleansed with Normal Saline. \par Kerlix  [de-identified] : 0.3 [FreeTextEntry8] : 1.4 [FreeTextEntry9] : 0.7 [de-identified] : Silver Alginate  [de-identified] : sanguinous [de-identified] : Expressed comfort post ACE application. [TWNoteComboBox4] : Small [de-identified] : Cleansed with Normal Saline. \par Kerlix  [de-identified] : Normal [de-identified] : None [de-identified] : None [de-identified] : No [de-identified] : 50% [de-identified] : 1% Lidocaine Injection [de-identified] : Ace wraps [de-identified] : 3x Weekly [de-identified] : Moderate [de-identified] : Other [de-identified] : Normal [de-identified] : None [de-identified] : 100% [de-identified] : None [de-identified] : 1% Lidocaine Injection [de-identified] : No [de-identified] : Ace wraps [de-identified] : Other [de-identified] : 3x Weekly

## 2020-07-16 NOTE — PLAN
[FreeTextEntry1] : continue wound care , sub acute facility , HBOT when patient discharges from sub acute

## 2020-07-16 NOTE — ASSESSMENT
[Verbal] : Verbal [Written] : Written [Demo] : Demo [Patient] : Patient [Good - alert, interested, motivated] : Good - alert, interested, motivated [Verbalizes knowledge/Understanding] : Verbalizes knowledge/understanding [Dressing changes] : dressing changes [Skin Care] : skin care [Arterial Disease] : arterial disease [Signs and symptoms of infection] : sign and symptoms of infection [How and When to Call] : how and when to call [Pain Management] : pain management [Hyperbaric Therapy] : hyperbaric therapy [Hospitalization] : hospitalization [Patient responsibility to plan of care] : patient responsibility to plan of care [Glycemic Control] : glycemic control [Stable] : stable [Wheelchair] : Wheelchair [Not Applicable - Long Term Care/Home Health Agency] : Long Term Care/Home Health Agency: Not Applicable [LTC] : LTC [] : No [FreeTextEntry4] : Pt to be considered for HBOT. If pt is a candidate, please process during next wc visit.\par MD/DPM TO DETERMINE IF PT IS A HYPERBARIC CANDIDATE. IF PT IS A CANDIDATE PLEASE PROCESS.  [FreeTextEntry3] : Patient brought to [facility] [FreeTextEntry2] : Infection prevention\par Localized wound care \par Acceptable pain tolerance levels deemed to be 3/10.\par hyperbaric oxygen therapy  [FreeTextEntry1] : [facility]- sent with Pt

## 2020-07-16 NOTE — REVIEW OF SYSTEMS
[Fever] : no fever [Chills] : no chills [Eye Pain] : no eye pain [Loss Of Hearing] : no hearing loss [Earache] : no earache [Chest Pain] : no chest pain [Shortness Of Breath] : no shortness of breath [Abdominal Pain] : no abdominal pain [Skin Wound] : skin wound [Limb Weakness] : limb weakness [Anxiety] : no anxiety [Easy Bleeding] : a tendency for easy bleeding [FreeTextEntry5] : htn, hld diabetic cardio vascular disease  [FreeTextEntry9] : s/p left foot TMA w/achilles tenotomy [de-identified] : diabetic neuropathy [de-identified] : left foot dfu3 , s/p TMA some sutures removed , healing well  [de-identified] : IDDM with neuropathy

## 2020-07-18 NOTE — PROGRESS NOTE ADULT - PROBLEM SELECTOR PROBLEM 8
Type 2 diabetes mellitus
Type 2 diabetes mellitus
BPH (benign prostatic hyperplasia)
Type 2 diabetes mellitus
Fair

## 2020-07-22 ENCOUNTER — OUTPATIENT (OUTPATIENT)
Dept: OUTPATIENT SERVICES | Facility: HOSPITAL | Age: 50
LOS: 1 days | Discharge: HOME | End: 2020-07-22
Payer: COMMERCIAL

## 2020-07-22 ENCOUNTER — APPOINTMENT (OUTPATIENT)
Dept: PODIATRY | Facility: HOSPITAL | Age: 50
End: 2020-07-22
Payer: MEDICAID

## 2020-07-22 VITALS
SYSTOLIC BLOOD PRESSURE: 106 MMHG | WEIGHT: 215 LBS | BODY MASS INDEX: 27.59 KG/M2 | HEART RATE: 60 BPM | RESPIRATION RATE: 20 BRPM | OXYGEN SATURATION: 95 % | TEMPERATURE: 99.3 F | HEIGHT: 74 IN | DIASTOLIC BLOOD PRESSURE: 64 MMHG

## 2020-07-22 DIAGNOSIS — K25.5 CHRONIC OR UNSPECIFIED GASTRIC ULCER WITH PERFORATION: Chronic | ICD-10-CM

## 2020-07-22 DIAGNOSIS — Z98.890 OTHER SPECIFIED POSTPROCEDURAL STATES: Chronic | ICD-10-CM

## 2020-07-22 DIAGNOSIS — S98.912A COMPLETE TRAUMATIC AMPUTATION OF LEFT FOOT, LEVEL UNSPECIFIED, INITIAL ENCOUNTER: Chronic | ICD-10-CM

## 2020-07-22 DIAGNOSIS — E11.621 TYPE 2 DIABETES MELLITUS WITH FOOT ULCER: ICD-10-CM

## 2020-07-22 PROCEDURE — G0463: CPT

## 2020-07-22 PROCEDURE — 99024 POSTOP FOLLOW-UP VISIT: CPT

## 2020-07-23 NOTE — HISTORY OF PRESENT ILLNESS
[FreeTextEntry1] : s/p TMA left foot ,  new DFU posterior left heel associated with pressure , SSF , DFU 3 ,

## 2020-07-23 NOTE — REVIEW OF SYSTEMS
[Limb Weakness] : limb weakness [Skin Wound] : skin wound [Easy Bleeding] : a tendency for easy bleeding [Chills] : no chills [Fever] : no fever [Eye Pain] : no eye pain [Earache] : no earache [Loss Of Hearing] : no hearing loss [Chest Pain] : no chest pain [Shortness Of Breath] : no shortness of breath [Abdominal Pain] : no abdominal pain [Anxiety] : no anxiety [FreeTextEntry5] : htn, hld diabetic cardio vascular disease  [FreeTextEntry9] : s/p left foot TMA w/achilles tenotomy [de-identified] : diabetic neuropathy [de-identified] : left foot dfu3 , s/p TMA  sutures removed , healing well , left heel DFU 3 new , SSF [de-identified] : IDDM with neuropathy

## 2020-07-23 NOTE — PHYSICAL EXAM
[4 x 4] : 4 x 4  [Abdominal Pad] : Abdominal Pad [1+] : left 1+ [Ankle Swelling (On Exam)] : present [Ankle Swelling On The Left] : moderate [Skin Ulcer] : ulcer [Alert] : alert [Oriented to Person] : oriented to person [Oriented to Place] : oriented to place [Calm] : calm [JVD] : no jugular venous distention  [Varicose Veins Of Lower Extremities] : not present [] : not present [de-identified] : calm [de-identified] : left foot TMA ,  sutures remove , healing well , no necrosis , new DFU 3 posterior left heel SSF [de-identified] : s/p left TMA and Achilles tenotomy [de-identified] : diminished light touch sensation [FreeTextEntry1] : Left foot TMA site medial -Sutures in place. tiny openings in Incision line  [FreeTextEntry2] : 0.5 [FreeTextEntry3] : 0.3 [FreeTextEntry4] : 0.1 [de-identified] : sanguinous [de-identified] : Silver Alginate  [de-identified] : Expressed comfort post ACE application. [de-identified] : Cleansed with Normal Saline. \par Kerlix  [FreeTextEntry7] : Left foot TMA site lateral - Sutures in place [de-identified] : Serosanguineous  [de-identified] : Expressed comfort post ACE application. [de-identified] : Silver Alginate  [de-identified] : Cleansed with Normal Saline. \par Kerlix  [de-identified] : Heel- Eschar [de-identified] : 2.5 [de-identified] : 1.0 [de-identified] : Serosanguineous  [de-identified] : 0.1 [de-identified] : Silver Alginate  [de-identified] : Cleansed with Normal Saline.  [de-identified] : Intact  [TWNoteComboBox4] : Small [de-identified] : Normal [de-identified] : None [de-identified] : None [de-identified] : 50% [de-identified] : No [de-identified] : 3x Weekly [de-identified] : Ace wraps [de-identified] : Small [de-identified] : Normal [de-identified] : None [de-identified] : None [de-identified] : 100% [de-identified] : No [de-identified] : Ace wraps [de-identified] : 3x Weekly [de-identified] : Left [de-identified] : None [de-identified] : other [de-identified] : Moderate [de-identified] : Pressure [de-identified] : Ace wraps [de-identified] : 3x Weekly

## 2020-07-23 NOTE — ASSESSMENT
[Verbal] : Verbal [Demo] : Demo [Written] : Written [Good - alert, interested, motivated] : Good - alert, interested, motivated [Patient] : Patient [Verbalizes knowledge/Understanding] : Verbalizes knowledge/understanding [Dressing changes] : dressing changes [Signs and symptoms of infection] : sign and symptoms of infection [Skin Care] : skin care [How and When to Call] : how and when to call [Arterial Disease] : arterial disease [Pain Management] : pain management [Hyperbaric Therapy] : hyperbaric therapy [Patient responsibility to plan of care] : patient responsibility to plan of care [Glycemic Control] : glycemic control [Hospitalization] : hospitalization [Stable] : stable [LTC] : LTC [Wheelchair] : Wheelchair [Not Applicable - Long Term Care/Home Health Agency] : Long Term Care/Home Health Agency: Not Applicable [] : No [FreeTextEntry4] : Pt is a HBOT Candidate. Pt is currently at [facility], he has been there since 5/23/20 for TMA, TMA revision on 6/20/20, went back [facility] on 6/24/20 from the hospital. Pt unable to start HBOT at this time due to time restrictions.\par Submitted for Yan VILLALTA) to meet Pt at next visit to [facility].\par Pt to F/U to [facility] in 1 Week  [FreeTextEntry2] : Infection prevention\par Localized wound care \par Acceptable pain tolerance levels deemed to be 3/10.\par HBOT [FreeTextEntry3] : Pt has a new wound on Left Heel from pressure [FreeTextEntry1] : [facility]- sent with Pt

## 2020-07-29 ENCOUNTER — APPOINTMENT (OUTPATIENT)
Dept: PODIATRY | Facility: HOSPITAL | Age: 50
End: 2020-07-29
Payer: MEDICAID

## 2020-07-29 ENCOUNTER — OUTPATIENT (OUTPATIENT)
Dept: OUTPATIENT SERVICES | Facility: HOSPITAL | Age: 50
LOS: 1 days | Discharge: HOME | End: 2020-07-29
Payer: COMMERCIAL

## 2020-07-29 VITALS
OXYGEN SATURATION: 97 % | SYSTOLIC BLOOD PRESSURE: 113 MMHG | HEART RATE: 60 BPM | TEMPERATURE: 97.8 F | HEIGHT: 74 IN | BODY MASS INDEX: 27.59 KG/M2 | DIASTOLIC BLOOD PRESSURE: 72 MMHG | WEIGHT: 215 LBS | RESPIRATION RATE: 20 BRPM

## 2020-07-29 DIAGNOSIS — E11.40 TYPE 2 DIABETES MELLITUS WITH DIABETIC NEUROPATHY, UNSPECIFIED: ICD-10-CM

## 2020-07-29 DIAGNOSIS — E87.6 HYPOKALEMIA: ICD-10-CM

## 2020-07-29 DIAGNOSIS — Z91.013 ALLERGY TO SEAFOOD: ICD-10-CM

## 2020-07-29 DIAGNOSIS — K59.00 CONSTIPATION, UNSPECIFIED: ICD-10-CM

## 2020-07-29 DIAGNOSIS — Z79.82 LONG TERM (CURRENT) USE OF ASPIRIN: ICD-10-CM

## 2020-07-29 DIAGNOSIS — E56.9 VITAMIN DEFICIENCY, UNSPECIFIED: ICD-10-CM

## 2020-07-29 DIAGNOSIS — I69.351 HEMIPLEGIA AND HEMIPARESIS FOLLOWING CEREBRAL INFARCTION AFFECTING RIGHT DOMINANT SIDE: ICD-10-CM

## 2020-07-29 DIAGNOSIS — Z87.891 PERSONAL HISTORY OF NICOTINE DEPENDENCE: ICD-10-CM

## 2020-07-29 DIAGNOSIS — N40.1 BENIGN PROSTATIC HYPERPLASIA WITH LOWER URINARY TRACT SYMPTOMS: ICD-10-CM

## 2020-07-29 DIAGNOSIS — I25.2 OLD MYOCARDIAL INFARCTION: ICD-10-CM

## 2020-07-29 DIAGNOSIS — D64.9 ANEMIA, UNSPECIFIED: ICD-10-CM

## 2020-07-29 DIAGNOSIS — E11.621 TYPE 2 DIABETES MELLITUS WITH FOOT ULCER: ICD-10-CM

## 2020-07-29 DIAGNOSIS — K25.5 CHRONIC OR UNSPECIFIED GASTRIC ULCER WITH PERFORATION: Chronic | ICD-10-CM

## 2020-07-29 DIAGNOSIS — L97.422 NON-PRESSURE CHRONIC ULCER OF LEFT HEEL AND MIDFOOT WITH FAT LAYER EXPOSED: ICD-10-CM

## 2020-07-29 DIAGNOSIS — Z79.899 OTHER LONG TERM (CURRENT) DRUG THERAPY: ICD-10-CM

## 2020-07-29 DIAGNOSIS — L97.524 NON-PRESSURE CHRONIC ULCER OF OTHER PART OF LEFT FOOT WITH NECROSIS OF BONE: ICD-10-CM

## 2020-07-29 DIAGNOSIS — Z79.01 LONG TERM (CURRENT) USE OF ANTICOAGULANTS: ICD-10-CM

## 2020-07-29 DIAGNOSIS — Z87.440 PERSONAL HISTORY OF URINARY (TRACT) INFECTIONS: ICD-10-CM

## 2020-07-29 DIAGNOSIS — Z79.4 LONG TERM (CURRENT) USE OF INSULIN: ICD-10-CM

## 2020-07-29 DIAGNOSIS — Z89.432 ACQUIRED ABSENCE OF LEFT FOOT: ICD-10-CM

## 2020-07-29 DIAGNOSIS — Z98.890 OTHER SPECIFIED POSTPROCEDURAL STATES: Chronic | ICD-10-CM

## 2020-07-29 DIAGNOSIS — S98.912A COMPLETE TRAUMATIC AMPUTATION OF LEFT FOOT, LEVEL UNSPECIFIED, INITIAL ENCOUNTER: Chronic | ICD-10-CM

## 2020-07-29 PROCEDURE — 99024 POSTOP FOLLOW-UP VISIT: CPT

## 2020-07-29 PROCEDURE — G0463: CPT

## 2020-08-04 NOTE — ASSESSMENT
[Written] : Written [Verbal] : Verbal [Patient] : Patient [Demo] : Demo [Good - alert, interested, motivated] : Good - alert, interested, motivated [Verbalizes knowledge/Understanding] : Verbalizes knowledge/understanding [Dressing changes] : dressing changes [Skin Care] : skin care [Signs and symptoms of infection] : sign and symptoms of infection [Arterial Disease] : arterial disease [How and When to Call] : how and when to call [Pain Management] : pain management [Hyperbaric Therapy] : hyperbaric therapy [Hospitalization] : hospitalization [Glycemic Control] : glycemic control [Patient responsibility to plan of care] : patient responsibility to plan of care [Stable] : stable [Hospital] : Hospital [Wheelchair] : Wheelchair [Not Applicable - Long Term Care/Home Health Agency] : Long Term Care/Home Health Agency: Not Applicable [] : No [FreeTextEntry2] : Infection prevention\par Localized wound care \par Acceptable pain tolerance levels deemed to be 3/10.\par hyperbaric oxygen therapy  [FreeTextEntry4] : Follow up when patient discharged from hospital \par Pt to be considered for HBOT. If pt is a candidate, please process during next wc visit.\par  [FreeTextEntry3] : Patient brought to [place] [facility]

## 2020-08-04 NOTE — PHYSICAL EXAM
[Please See PDF for Tissue Analytics] : Please See PDF for [employer] Analytics. [1+] : left 1+ [Ankle Swelling (On Exam)] : present [Ankle Swelling On The Left] : moderate [Skin Ulcer] : ulcer [Alert] : alert [Oriented to Person] : oriented to person [Oriented to Place] : oriented to place [Calm] : calm [JVD] : no jugular venous distention  [Varicose Veins Of Lower Extremities] : not present [] : not present [de-identified] : calm [de-identified] : htn, hld, diabetic cardiovascular disease [de-identified] : s/p left TMA and achilles tenotomy [FreeTextEntry1] : diabetic small vessel disease [de-identified] : diminished light touch sensation [de-identified] : left foot TMA , foul smelling , packing in place , necrosis of skin and subcutaneous tissue some sutures remove

## 2020-08-04 NOTE — REVIEW OF SYSTEMS
[Limb Weakness] : limb weakness [Skin Wound] : skin wound [Fever] : no fever [Eye Pain] : no eye pain [Earache] : no earache [Loss Of Hearing] : no hearing loss [Chest Pain] : no chest pain [Shortness Of Breath] : no shortness of breath [Abdominal Pain] : no abdominal pain [FreeTextEntry5] : htn, hld diabetic cardio vascular disease  [FreeTextEntry9] : s/p left foot TMA w/achilles tenotomy [de-identified] : left foot dfu3 with incision site dehiscence at distal stump with bleeding noted ,foul smelling with packing in place [de-identified] : diabetic neuropathy

## 2020-08-04 NOTE — PLAN
[FreeTextEntry1] : Patient was admitted to the hospital for IV antibiotics , and revision of the left foot TMA site . Patient understands that he is high risk for limb loss .

## 2020-08-04 NOTE — VITALS
[Dull] : dull [Pain related to present condition?] : The patient's  pain is related to present condition. [Throbbing] : throbbing [] : No [de-identified] : 3/10      Acceptable pain tolerance levels deemed to be 3/10. [FreeTextEntry3] : Left foot [FreeTextEntry1] : Offloading  [FreeTextEntry2] : Frequent ambulation  [FreeTextEntry4] : Thick padded dressing

## 2020-08-05 ENCOUNTER — APPOINTMENT (OUTPATIENT)
Dept: PODIATRY | Facility: HOSPITAL | Age: 50
End: 2020-08-05
Payer: MEDICAID

## 2020-08-05 ENCOUNTER — RESULT REVIEW (OUTPATIENT)
Age: 50
End: 2020-08-05

## 2020-08-05 ENCOUNTER — OUTPATIENT (OUTPATIENT)
Dept: OUTPATIENT SERVICES | Facility: HOSPITAL | Age: 50
LOS: 1 days | Discharge: HOME | End: 2020-08-05
Payer: COMMERCIAL

## 2020-08-05 VITALS
OXYGEN SATURATION: 97 % | HEART RATE: 69 BPM | BODY MASS INDEX: 27.59 KG/M2 | RESPIRATION RATE: 20 BRPM | HEIGHT: 74 IN | WEIGHT: 215 LBS | SYSTOLIC BLOOD PRESSURE: 103 MMHG | TEMPERATURE: 97.5 F | DIASTOLIC BLOOD PRESSURE: 65 MMHG

## 2020-08-05 DIAGNOSIS — S98.912A COMPLETE TRAUMATIC AMPUTATION OF LEFT FOOT, LEVEL UNSPECIFIED, INITIAL ENCOUNTER: Chronic | ICD-10-CM

## 2020-08-05 DIAGNOSIS — E11.621 TYPE 2 DIABETES MELLITUS WITH FOOT ULCER: ICD-10-CM

## 2020-08-05 DIAGNOSIS — K25.5 CHRONIC OR UNSPECIFIED GASTRIC ULCER WITH PERFORATION: Chronic | ICD-10-CM

## 2020-08-05 DIAGNOSIS — Z98.890 OTHER SPECIFIED POSTPROCEDURAL STATES: Chronic | ICD-10-CM

## 2020-08-05 LAB
GRAM STN FLD: SIGNIFICANT CHANGE UP
SPECIMEN SOURCE: SIGNIFICANT CHANGE UP

## 2020-08-05 PROCEDURE — 87075 CULTR BACTERIA EXCEPT BLOOD: CPT

## 2020-08-05 PROCEDURE — 87186 SC STD MICRODIL/AGAR DIL: CPT

## 2020-08-05 PROCEDURE — 88304 TISSUE EXAM BY PATHOLOGIST: CPT

## 2020-08-05 PROCEDURE — 88304 TISSUE EXAM BY PATHOLOGIST: CPT | Mod: 26

## 2020-08-05 PROCEDURE — 87070 CULTURE OTHR SPECIMN AEROBIC: CPT

## 2020-08-05 PROCEDURE — 11043 DBRDMT MUSC&/FSCA 1ST 20/<: CPT

## 2020-08-05 PROCEDURE — 11043 DBRDMT MUSC&/FSCA 1ST 20/<: CPT | Mod: 79

## 2020-08-06 DIAGNOSIS — Z87.891 PERSONAL HISTORY OF NICOTINE DEPENDENCE: ICD-10-CM

## 2020-08-06 DIAGNOSIS — Z79.01 LONG TERM (CURRENT) USE OF ANTICOAGULANTS: ICD-10-CM

## 2020-08-06 DIAGNOSIS — L97.423 NON-PRESSURE CHRONIC ULCER OF LEFT HEEL AND MIDFOOT WITH NECROSIS OF MUSCLE: ICD-10-CM

## 2020-08-06 DIAGNOSIS — Z79.4 LONG TERM (CURRENT) USE OF INSULIN: ICD-10-CM

## 2020-08-06 DIAGNOSIS — K59.00 CONSTIPATION, UNSPECIFIED: ICD-10-CM

## 2020-08-06 DIAGNOSIS — L97.524 NON-PRESSURE CHRONIC ULCER OF OTHER PART OF LEFT FOOT WITH NECROSIS OF BONE: ICD-10-CM

## 2020-08-06 DIAGNOSIS — N40.1 BENIGN PROSTATIC HYPERPLASIA WITH LOWER URINARY TRACT SYMPTOMS: ICD-10-CM

## 2020-08-06 DIAGNOSIS — Z79.899 OTHER LONG TERM (CURRENT) DRUG THERAPY: ICD-10-CM

## 2020-08-06 DIAGNOSIS — E11.621 TYPE 2 DIABETES MELLITUS WITH FOOT ULCER: ICD-10-CM

## 2020-08-06 DIAGNOSIS — D64.9 ANEMIA, UNSPECIFIED: ICD-10-CM

## 2020-08-06 DIAGNOSIS — I69.351 HEMIPLEGIA AND HEMIPARESIS FOLLOWING CEREBRAL INFARCTION AFFECTING RIGHT DOMINANT SIDE: ICD-10-CM

## 2020-08-06 DIAGNOSIS — Z89.432 ACQUIRED ABSENCE OF LEFT FOOT: ICD-10-CM

## 2020-08-06 DIAGNOSIS — E87.6 HYPOKALEMIA: ICD-10-CM

## 2020-08-06 DIAGNOSIS — I25.2 OLD MYOCARDIAL INFARCTION: ICD-10-CM

## 2020-08-06 DIAGNOSIS — Z79.82 LONG TERM (CURRENT) USE OF ASPIRIN: ICD-10-CM

## 2020-08-06 DIAGNOSIS — Z87.440 PERSONAL HISTORY OF URINARY (TRACT) INFECTIONS: ICD-10-CM

## 2020-08-06 DIAGNOSIS — E56.9 VITAMIN DEFICIENCY, UNSPECIFIED: ICD-10-CM

## 2020-08-06 DIAGNOSIS — Z91.013 ALLERGY TO SEAFOOD: ICD-10-CM

## 2020-08-06 LAB — SURGICAL PATHOLOGY STUDY: SIGNIFICANT CHANGE UP

## 2020-08-07 LAB
-  AMIKACIN: SIGNIFICANT CHANGE UP
-  AMPICILLIN/SULBACTAM: SIGNIFICANT CHANGE UP
-  AZTREONAM: SIGNIFICANT CHANGE UP
-  CEFAZOLIN: SIGNIFICANT CHANGE UP
-  CEFEPIME: SIGNIFICANT CHANGE UP
-  CEFTAZIDIME: SIGNIFICANT CHANGE UP
-  CIPROFLOXACIN: SIGNIFICANT CHANGE UP
-  CLINDAMYCIN: SIGNIFICANT CHANGE UP
-  ERYTHROMYCIN: SIGNIFICANT CHANGE UP
-  GENTAMICIN: SIGNIFICANT CHANGE UP
-  GENTAMICIN: SIGNIFICANT CHANGE UP
-  IMIPENEM: SIGNIFICANT CHANGE UP
-  LEVOFLOXACIN: SIGNIFICANT CHANGE UP
-  MEROPENEM: SIGNIFICANT CHANGE UP
-  OXACILLIN: SIGNIFICANT CHANGE UP
-  PENICILLIN: SIGNIFICANT CHANGE UP
-  PIPERACILLIN/TAZOBACTAM: SIGNIFICANT CHANGE UP
-  RIFAMPIN: SIGNIFICANT CHANGE UP
-  TETRACYCLINE: SIGNIFICANT CHANGE UP
-  TOBRAMYCIN: SIGNIFICANT CHANGE UP
-  TRIMETHOPRIM/SULFAMETHOXAZOLE: SIGNIFICANT CHANGE UP
-  VANCOMYCIN: SIGNIFICANT CHANGE UP
METHOD TYPE: SIGNIFICANT CHANGE UP
METHOD TYPE: SIGNIFICANT CHANGE UP

## 2020-08-10 LAB
CULTURE RESULTS: SIGNIFICANT CHANGE UP
ORGANISM # SPEC MICROSCOPIC CNT: SIGNIFICANT CHANGE UP
SPECIMEN SOURCE: SIGNIFICANT CHANGE UP

## 2020-08-11 NOTE — REVIEW OF SYSTEMS
[Skin Wound] : skin wound [Limb Weakness] : limb weakness [Easy Bleeding] : a tendency for easy bleeding [Fever] : no fever [Chills] : no chills [Eye Pain] : no eye pain [Earache] : no earache [Loss Of Hearing] : no hearing loss [Chest Pain] : no chest pain [Shortness Of Breath] : no shortness of breath [Abdominal Pain] : no abdominal pain [FreeTextEntry5] : htn, hld diabetic cardio vascular disease  [Anxiety] : no anxiety [de-identified] : diabetic neuropathy [de-identified] : left foot dfu3 , s/p TMA   , healing well , left heel DFU 3 new , SSF , debrided today  [FreeTextEntry9] : s/p left foot TMA w/achilles tenotomy [de-identified] : IDDM with neuropathy

## 2020-08-11 NOTE — PROCEDURE
[Betadine] : betadine [Fibrotic] : fibrotic [Slough] : slough [Necrotic] : necrotic [Sharp scissors] : sharp scissors [Scalpel] : scalpel [Skin] : skin [Fat] : fat [Fascia] : fascia [Subcutaneous tissue] : subcutaneous tissue [Culture and Sensitivity] : culture and sensitivity [Sent to Lab] : which was entirely removed and was sent to the laboratory for [Pathologic Exam] : pathologic exam [Clean] : clean [Pink] : pink [Pressure] : pressure [Surgical Thrombipad] : surgical thrombipad [FreeTextEntry2] : left DFU 3 heel [FreeTextEntry9] : 950 [] : No [de-identified] : Necrotic DFU 3 posterior left heel  [FreeTextEntry6] : DFU 3 left heel  [de-identified] : Geoffrey [de-identified] : 10cc [FreeTextEntry7] : same [de-identified] : 0 [de-identified] : necrotic skin, subcutaneous , fat and fascia

## 2020-08-11 NOTE — PHYSICAL EXAM
[Abdominal Pad] : Abdominal Pad [4 x 4] : 4 x 4  [1+] : left 1+ [Ankle Swelling (On Exam)] : present [Ankle Swelling On The Left] : of the left ankle [Skin Ulcer] : ulcer [Alert] : alert [Oriented to Person] : oriented to person [Oriented to Place] : oriented to place [Calm] : calm [JVD] : no jugular venous distention  [] : not present [Varicose Veins Of Lower Extremities] : not present [de-identified] : calm [de-identified] : s/p left TMA and Achilles tenotomy [de-identified] : left foot TMA ,  , healing well , no necrosis , new DFU 3 posterior left heel SSF [de-identified] : diminished light touch sensation [FreeTextEntry7] : Left Foot TMA Site Lateral [de-identified] : Serosanguineous [FreeTextEntry1] : Left Foot TMA Site Medial- Closed [de-identified] : Silver Alginate [de-identified] : Intact [de-identified] : Left Heel- Dry Eschar [de-identified] : Small amount of Blood Loss, No Pain During or Post Procedure, Pt tolerated Well.\par  [de-identified] : Cleansed with Normal Saline\par  [de-identified] : 1.9 [de-identified] : 2.8 [de-identified] : 0.2 [de-identified] : Serosanguineous [de-identified] : Thrombi Pad [de-identified] : Post Debridement Measurements 2.9 x 2.0 x 0.4 [de-identified] : Intact [de-identified] : Ace wraps [de-identified] : Cleansed with Normal Saline\par  [de-identified] : Small [de-identified] : No [de-identified] : No [de-identified] : None [de-identified] : 100% [de-identified] : None [de-identified] : No [de-identified] : 3x Weekly [de-identified] : Ace wraps [de-identified] : No [de-identified] : Small [de-identified] : No [de-identified] : 100% [de-identified] : None [de-identified] : None [de-identified] : No [de-identified] : 2.5% Lidocaine Topical [de-identified] : Adam [de-identified] : Debridement performed of all devitalized tissue to bleeding viable tissue [de-identified] : Ace wraps [de-identified] : Weekly

## 2020-08-11 NOTE — ASSESSMENT
[Verbal] : Verbal [Good - alert, interested, motivated] : Good - alert, interested, motivated [Patient] : Patient [Verbalizes knowledge/Understanding] : Verbalizes knowledge/understanding [Dressing changes] : dressing changes [Foot Care] : foot care [Skin Care] : skin care [Signs and symptoms of infection] : sign and symptoms of infection [How and When to Call] : how and when to call [Compression Therapy] : compression therapy [Patient responsibility to plan of care] : patient responsibility to plan of care [Stable] : stable [Wheelchair] : Wheelchair [Not Applicable - Long Term Care/Home Health Agency] : Long Term Care/Home Health Agency: Not Applicable [LTC] : LTC [] : No [FreeTextEntry2] : Restore Skin Integrity\par Infection Control\par Localized wound care\par Maintain acceptable pain levels at satisfactory relief.\par Demonstrates use of both nonpharmacological and pharmacological pain relief strategies [FreeTextEntry4] : F/U to [facility] in 1 week\par DPM feels pt would benefit from Apligraf, Auth Submitted for Apligraf #1\par Seen by Yan WANG at this time No diabetic shoes are ordered as pt needs to be completely healed. Revisit once pt is healed. MD aware\par \par 1 unit apligraf ordered on 08/10/20 for pts next assessment

## 2020-08-12 ENCOUNTER — OUTPATIENT (OUTPATIENT)
Dept: OUTPATIENT SERVICES | Facility: HOSPITAL | Age: 50
LOS: 1 days | Discharge: HOME | End: 2020-08-12
Payer: COMMERCIAL

## 2020-08-12 ENCOUNTER — APPOINTMENT (OUTPATIENT)
Dept: PODIATRY | Facility: HOSPITAL | Age: 50
End: 2020-08-12
Payer: MEDICAID

## 2020-08-12 VITALS
OXYGEN SATURATION: 97 % | SYSTOLIC BLOOD PRESSURE: 127 MMHG | TEMPERATURE: 98.4 F | DIASTOLIC BLOOD PRESSURE: 69 MMHG | HEART RATE: 71 BPM | HEIGHT: 74 IN | RESPIRATION RATE: 20 BRPM | BODY MASS INDEX: 27.59 KG/M2 | WEIGHT: 215 LBS

## 2020-08-12 DIAGNOSIS — E11.621 TYPE 2 DIABETES MELLITUS WITH FOOT ULCER: ICD-10-CM

## 2020-08-12 DIAGNOSIS — S98.912A COMPLETE TRAUMATIC AMPUTATION OF LEFT FOOT, LEVEL UNSPECIFIED, INITIAL ENCOUNTER: Chronic | ICD-10-CM

## 2020-08-12 DIAGNOSIS — K25.5 CHRONIC OR UNSPECIFIED GASTRIC ULCER WITH PERFORATION: Chronic | ICD-10-CM

## 2020-08-12 DIAGNOSIS — Z98.890 OTHER SPECIFIED POSTPROCEDURAL STATES: Chronic | ICD-10-CM

## 2020-08-12 DIAGNOSIS — L97.524 NON-PRESSURE CHRONIC ULCER OF OTHER PART OF LEFT FOOT WITH NECROSIS OF BONE: ICD-10-CM

## 2020-08-12 PROCEDURE — 15275 SKIN SUB GRAFT FACE/NK/HF/G: CPT

## 2020-08-12 PROCEDURE — 15275 SKIN SUB GRAFT FACE/NK/HF/G: CPT | Mod: 79

## 2020-08-13 DIAGNOSIS — Z79.4 LONG TERM (CURRENT) USE OF INSULIN: ICD-10-CM

## 2020-08-13 DIAGNOSIS — Z87.891 PERSONAL HISTORY OF NICOTINE DEPENDENCE: ICD-10-CM

## 2020-08-13 DIAGNOSIS — E87.6 HYPOKALEMIA: ICD-10-CM

## 2020-08-13 DIAGNOSIS — K59.00 CONSTIPATION, UNSPECIFIED: ICD-10-CM

## 2020-08-13 DIAGNOSIS — Z91.013 ALLERGY TO SEAFOOD: ICD-10-CM

## 2020-08-13 DIAGNOSIS — N40.1 BENIGN PROSTATIC HYPERPLASIA WITH LOWER URINARY TRACT SYMPTOMS: ICD-10-CM

## 2020-08-13 DIAGNOSIS — Z79.82 LONG TERM (CURRENT) USE OF ASPIRIN: ICD-10-CM

## 2020-08-13 DIAGNOSIS — Z87.440 PERSONAL HISTORY OF URINARY (TRACT) INFECTIONS: ICD-10-CM

## 2020-08-13 DIAGNOSIS — E11.621 TYPE 2 DIABETES MELLITUS WITH FOOT ULCER: ICD-10-CM

## 2020-08-13 DIAGNOSIS — Z79.899 OTHER LONG TERM (CURRENT) DRUG THERAPY: ICD-10-CM

## 2020-08-13 DIAGNOSIS — E56.9 VITAMIN DEFICIENCY, UNSPECIFIED: ICD-10-CM

## 2020-08-13 DIAGNOSIS — L97.423 NON-PRESSURE CHRONIC ULCER OF LEFT HEEL AND MIDFOOT WITH NECROSIS OF MUSCLE: ICD-10-CM

## 2020-08-13 DIAGNOSIS — E11.40 TYPE 2 DIABETES MELLITUS WITH DIABETIC NEUROPATHY, UNSPECIFIED: ICD-10-CM

## 2020-08-13 DIAGNOSIS — I69.351 HEMIPLEGIA AND HEMIPARESIS FOLLOWING CEREBRAL INFARCTION AFFECTING RIGHT DOMINANT SIDE: ICD-10-CM

## 2020-08-13 DIAGNOSIS — D64.9 ANEMIA, UNSPECIFIED: ICD-10-CM

## 2020-08-13 DIAGNOSIS — I25.2 OLD MYOCARDIAL INFARCTION: ICD-10-CM

## 2020-08-13 DIAGNOSIS — L97.524 NON-PRESSURE CHRONIC ULCER OF OTHER PART OF LEFT FOOT WITH NECROSIS OF BONE: ICD-10-CM

## 2020-08-13 DIAGNOSIS — Z79.01 LONG TERM (CURRENT) USE OF ANTICOAGULANTS: ICD-10-CM

## 2020-08-20 ENCOUNTER — OUTPATIENT (OUTPATIENT)
Dept: OUTPATIENT SERVICES | Facility: HOSPITAL | Age: 50
LOS: 1 days | Discharge: HOME | End: 2020-08-20
Payer: COMMERCIAL

## 2020-08-20 ENCOUNTER — APPOINTMENT (OUTPATIENT)
Dept: PODIATRY | Facility: HOSPITAL | Age: 50
End: 2020-08-20

## 2020-08-20 ENCOUNTER — APPOINTMENT (OUTPATIENT)
Dept: PODIATRY | Facility: HOSPITAL | Age: 50
End: 2020-08-20
Payer: MEDICAID

## 2020-08-20 VITALS
HEART RATE: 65 BPM | OXYGEN SATURATION: 98 % | WEIGHT: 215 LBS | SYSTOLIC BLOOD PRESSURE: 111 MMHG | TEMPERATURE: 97.8 F | HEIGHT: 74 IN | BODY MASS INDEX: 27.59 KG/M2 | RESPIRATION RATE: 20 BRPM | DIASTOLIC BLOOD PRESSURE: 68 MMHG

## 2020-08-20 DIAGNOSIS — Z98.890 OTHER SPECIFIED POSTPROCEDURAL STATES: Chronic | ICD-10-CM

## 2020-08-20 DIAGNOSIS — K25.5 CHRONIC OR UNSPECIFIED GASTRIC ULCER WITH PERFORATION: Chronic | ICD-10-CM

## 2020-08-20 DIAGNOSIS — Z79.4 LONG TERM (CURRENT) USE OF INSULIN: ICD-10-CM

## 2020-08-20 DIAGNOSIS — Z79.899 OTHER LONG TERM (CURRENT) DRUG THERAPY: ICD-10-CM

## 2020-08-20 DIAGNOSIS — E11.40 TYPE 2 DIABETES MELLITUS WITH DIABETIC NEUROPATHY, UNSPECIFIED: ICD-10-CM

## 2020-08-20 DIAGNOSIS — S98.912A COMPLETE TRAUMATIC AMPUTATION OF LEFT FOOT, LEVEL UNSPECIFIED, INITIAL ENCOUNTER: Chronic | ICD-10-CM

## 2020-08-20 DIAGNOSIS — E56.9 VITAMIN DEFICIENCY, UNSPECIFIED: ICD-10-CM

## 2020-08-20 DIAGNOSIS — I69.351 HEMIPLEGIA AND HEMIPARESIS FOLLOWING CEREBRAL INFARCTION AFFECTING RIGHT DOMINANT SIDE: ICD-10-CM

## 2020-08-20 DIAGNOSIS — E11.621 TYPE 2 DIABETES MELLITUS WITH FOOT ULCER: ICD-10-CM

## 2020-08-20 DIAGNOSIS — E87.6 HYPOKALEMIA: ICD-10-CM

## 2020-08-20 DIAGNOSIS — Z87.891 PERSONAL HISTORY OF NICOTINE DEPENDENCE: ICD-10-CM

## 2020-08-20 DIAGNOSIS — Z89.432 ACQUIRED ABSENCE OF LEFT FOOT: ICD-10-CM

## 2020-08-20 DIAGNOSIS — Z79.82 LONG TERM (CURRENT) USE OF ASPIRIN: ICD-10-CM

## 2020-08-20 DIAGNOSIS — D64.9 ANEMIA, UNSPECIFIED: ICD-10-CM

## 2020-08-20 DIAGNOSIS — I25.2 OLD MYOCARDIAL INFARCTION: ICD-10-CM

## 2020-08-20 DIAGNOSIS — Z87.440 PERSONAL HISTORY OF URINARY (TRACT) INFECTIONS: ICD-10-CM

## 2020-08-20 DIAGNOSIS — K59.00 CONSTIPATION, UNSPECIFIED: ICD-10-CM

## 2020-08-20 DIAGNOSIS — Z79.01 LONG TERM (CURRENT) USE OF ANTICOAGULANTS: ICD-10-CM

## 2020-08-20 DIAGNOSIS — N40.1 BENIGN PROSTATIC HYPERPLASIA WITH LOWER URINARY TRACT SYMPTOMS: ICD-10-CM

## 2020-08-20 DIAGNOSIS — Z90.13 ACQUIRED ABSENCE OF BILATERAL BREASTS AND NIPPLES: ICD-10-CM

## 2020-08-20 DIAGNOSIS — L97.521 NON-PRESSURE CHRONIC ULCER OF OTHER PART OF LEFT FOOT LIMITED TO BREAKDOWN OF SKIN: ICD-10-CM

## 2020-08-20 DIAGNOSIS — L97.423 NON-PRESSURE CHRONIC ULCER OF LEFT HEEL AND MIDFOOT WITH NECROSIS OF MUSCLE: ICD-10-CM

## 2020-08-20 PROCEDURE — 15275 SKIN SUB GRAFT FACE/NK/HF/G: CPT

## 2020-08-20 PROCEDURE — 15275 SKIN SUB GRAFT FACE/NK/HF/G: CPT | Mod: 79

## 2020-08-20 NOTE — VITALS
[Pain related to present condition?] : The patient's  pain is not related to present condition. [de-identified] : Pt reports 0/10 pain level at wound sites.

## 2020-08-20 NOTE — REASON FOR VISIT
[Apligraf] : apligraf [Other: _____] : [unfilled] [FreeTextEntry4] : DFU posterior left heel , clean and granular  [FreeTextEntry5] : left heel

## 2020-08-20 NOTE — ASSESSMENT
[Verbal] : Verbal [Patient] : Patient [Verbalizes knowledge/Understanding] : Verbalizes knowledge/understanding [Good - alert, interested, motivated] : Good - alert, interested, motivated [Dressing changes] : dressing changes [Foot Care] : foot care [Skin Care] : skin care [How and When to Call] : how and when to call [Signs and symptoms of infection] : sign and symptoms of infection [Home Health] : home health [Compression Therapy] : compression therapy [] : Yes [Stable] : stable [LTC] : LTC [Wheelchair] : Wheelchair [FreeTextEntry2] : Restore skin integrity\par Infection control; Local wound care [FreeTextEntry4] : F/U one week;  Apligraf #1 implanted today, Lot [id number], Exp 8/13/20; [id number]; 44 sq cm; 100% used; Reconstituted with N.Saline lot [id number], Exp 2/1/23;  authorization submitted today for Apligraf #2\par Documentation sent to LTC facility with Pt.\par 1 unit apligraf ordered on 08/19/20 for pts next assessment\par \par

## 2020-08-20 NOTE — PHYSICAL EXAM
[4 x 4] : 4 x 4  [Abdominal Pad] : Abdominal Pad [Ankle Swelling (On Exam)] : present [1+] : left 1+ [Ankle Swelling On The Left] : moderate [Skin Ulcer] : ulcer [Alert] : alert [Oriented to Person] : oriented to person [Calm] : calm [Oriented to Place] : oriented to place [JVD] : no jugular venous distention  [Varicose Veins Of Lower Extremities] : not present [] : not present [de-identified] : calm [de-identified] : s/p left TMA and Achilles tenotomy [de-identified] : diminished light touch sensation [de-identified] : left foot TMA ,  , healing well , no necrosis , new DFU 3 posterior left heel SSF [FreeTextEntry1] : TMA site - medial [FreeTextEntry2] : excoriation [de-identified] : silver alginate, allevyn [FreeTextEntry7] : TMA site - lateral [FreeTextEntry8] : excoriation [de-identified] : 2.5 [de-identified] : heel [de-identified] : silver alginate, Allevyn [de-identified] : 0.2 [de-identified] : 3.2 [de-identified] : 50% [de-identified] : for dressing support [de-identified] : Apligraf, wound veil, Alginate [TWNoteComboBox1] : Left [TWNoteComboBox4] : Small [de-identified] : Primary Dressing [de-identified] : Weekly [TWNoteComboBox9] : Left [de-identified] : Small [de-identified] : Weekly [de-identified] : Left [de-identified] : Moderate [de-identified] : False [de-identified] : Normal [de-identified] : None [de-identified] : None [de-identified] : 50% [de-identified] : Yes [de-identified] : Ace wraps [de-identified] : Weekly [de-identified] : Primary Dressing

## 2020-08-20 NOTE — REVIEW OF SYSTEMS
[Skin Wound] : skin wound [Limb Weakness] : limb weakness [Easy Bleeding] : a tendency for easy bleeding [Fever] : no fever [Chills] : no chills [Eye Pain] : no eye pain [Earache] : no earache [Loss Of Hearing] : no hearing loss [Chest Pain] : no chest pain [Shortness Of Breath] : no shortness of breath [Abdominal Pain] : no abdominal pain [Anxiety] : no anxiety [FreeTextEntry5] : htn, hld diabetic cardio vascular disease  [FreeTextEntry9] : s/p left foot TMA w/achilles tenotomy [de-identified] : left foot dfu3 , s/p TMA   , healing well , left heel DFU 3 new , SSF , debrided today  [de-identified] : IDDM with neuropathy  [de-identified] : diabetic neuropathy

## 2020-08-20 NOTE — PROCEDURE
[Saline] : saline [Scalpel] : scalpel [Hydrated with saline] : hydrated with saline [Other: ___] : [unfilled] [Fenestrated] : fenestrated [____ % was used] : and [unfilled] % was used [Apligraf] : apligraf [____ % was discarded] : and [unfilled] % was discarded [FreeTextEntry1] : wound veil/ alginate  [] : No [FreeTextEntry9] : 6766 [de-identified] : DFU 3 posterior left heel  [de-identified] : Geoffrey [FreeTextEntry6] : DFU posterior left heel  [FreeTextEntry7] : same  [de-identified] : 0 [de-identified] : 5cc [de-identified] : tiny

## 2020-08-25 NOTE — REVIEW OF SYSTEMS
[Skin Wound] : skin wound [Limb Weakness] : limb weakness [Easy Bleeding] : a tendency for easy bleeding [Fever] : no fever [Chills] : no chills [Earache] : no earache [Eye Pain] : no eye pain [Shortness Of Breath] : no shortness of breath [Chest Pain] : no chest pain [Loss Of Hearing] : no hearing loss [Anxiety] : no anxiety [Abdominal Pain] : no abdominal pain [de-identified] : left foot dfu3 , s/p TMA  sutures removed , healing well , left heel DFU 3 new , SSF [FreeTextEntry5] : htn, hld diabetic cardio vascular disease  [FreeTextEntry9] : s/p left foot TMA w/achilles tenotomy [de-identified] : diabetic neuropathy [de-identified] : IDDM with neuropathy

## 2020-08-25 NOTE — PHYSICAL EXAM
[1+] : left 1+ [Ankle Swelling (On Exam)] : present [Ankle Swelling On The Left] : moderate [Skin Ulcer] : ulcer [Alert] : alert [Oriented to Person] : oriented to person [Oriented to Place] : oriented to place [Calm] : calm [JVD] : no jugular venous distention  [Varicose Veins Of Lower Extremities] : not present [] : not present [de-identified] : calm [de-identified] : s/p left TMA and Achilles tenotomy [de-identified] : diminished light touch sensation [de-identified] : left foot TMA ,  , healing well , no necrosis , new DFU 3 posterior left heel SSF [FreeTextEntry1] : Left TMA site - Medial - Fragile epithelial tissue.  [de-identified] : White sock  [de-identified] : Scant Serous/sanguinous [de-identified] : Cleansed with NS\par  [FreeTextEntry7] : Left foot TMA - Lateral - Fragile epithelial tissue.  [de-identified] : White sock  [de-identified] : Scant Serous/sanguinous [de-identified] : Cleansed with NS\par  [de-identified] : 2.4 [de-identified] : Left heel  [de-identified] : No neurovascular deficits noted.\par  [de-identified] : 0.2-0.3 [de-identified] : 1.7 [de-identified] : Serous/sanguinous [de-identified] : Mild  [de-identified] : 50% [de-identified] : Apligraf  [de-identified] : Apligraf, Woundveil, calcium alginate [de-identified] : Loose for support.  [de-identified] : Normal [de-identified] : None [de-identified] : Cleansed with NS\par Kerlix \par \par Post debridement measurements: 2.5/1.8/0.2-0.3\par \par 1 unit of 44sq/cm applied.\par 75% used, 25% discarded\par ITM: [id number]\par LOT: [id number] \par \par Irrigated with 0.9% NS \par LOT: [id number]\par EXP: 2/1/23 \par \par Small amount of bleeding during procedure. Patient reports no pain during or post procedure and tolerated well.  [de-identified] : None [de-identified] : 100% [de-identified] : Daily [de-identified] : No [de-identified] : Ace wraps [de-identified] : Compression [de-identified] : None [de-identified] : Normal [de-identified] : 100% [de-identified] : None [de-identified] : No [de-identified] : Daily [de-identified] : Compression [de-identified] : Erythema [de-identified] : Moderate [de-identified] : None [de-identified] : None [de-identified] : 50% [de-identified] : Yes [de-identified] : 2.5% Lidocaine Topical [de-identified] : Adam [de-identified] : Application of skin substitute [de-identified] : Primary Dressing [de-identified] : Weekly [de-identified] : Ace wraps

## 2020-08-25 NOTE — REVIEW OF SYSTEMS
[Skin Wound] : skin wound [Limb Weakness] : limb weakness [Easy Bleeding] : a tendency for easy bleeding [Fever] : no fever [Chills] : no chills [Eye Pain] : no eye pain [Earache] : no earache [Loss Of Hearing] : no hearing loss [Shortness Of Breath] : no shortness of breath [Chest Pain] : no chest pain [Abdominal Pain] : no abdominal pain [Anxiety] : no anxiety [FreeTextEntry5] : htn, hld diabetic cardio vascular disease  [FreeTextEntry9] : s/p left foot TMA w/achilles tenotomy [de-identified] : IDDM with neuropathy  [de-identified] : left foot dfu3 , s/p TMA   , healing well , left heel DFU 3 new , SSF , debrided today  [de-identified] : diabetic neuropathy

## 2020-08-25 NOTE — PHYSICAL EXAM
[4 x 4] : 4 x 4  [Abdominal Pad] : Abdominal Pad [1+] : left 1+ [Ankle Swelling (On Exam)] : present [Ankle Swelling On The Left] : moderate [Skin Ulcer] : ulcer [Alert] : alert [Oriented to Person] : oriented to person [Oriented to Place] : oriented to place [Calm] : calm [JVD] : no jugular venous distention  [] : not present [de-identified] : calm [Varicose Veins Of Lower Extremities] : not present [de-identified] : s/p left TMA and Achilles tenotomy [de-identified] : left foot TMA ,  , healing well , no necrosis , new DFU 3 posterior left heel SSF [de-identified] : diminished light touch sensation [FreeTextEntry1] : Left foot TMA site medial - Resolved [de-identified] : Intact [de-identified] : NSC [FreeTextEntry7] : Left foot TMA site lateral [de-identified] : Serosanguineous  [de-identified] : Intact [de-identified] : Ag Alginate  [de-identified] : Heel- Eschar [de-identified] : NSC [de-identified] : 2.5 [de-identified] : 1.0 [de-identified] : 0.1 [de-identified] : Intact  [de-identified] : NSC [de-identified] : Ag Alginate  [de-identified] : Serosanguineous  [TWNoteComboBox4] : None [de-identified] : Normal [de-identified] : None [de-identified] : None [de-identified] : 50% [de-identified] : No [de-identified] : Ace wraps [de-identified] : 3x Weekly [de-identified] : Small [de-identified] : Normal [de-identified] : None [de-identified] : None [de-identified] : 100% [de-identified] : No [de-identified] : Ace wraps [de-identified] : 3x Weekly [de-identified] : Left [de-identified] : Moderate [de-identified] : Pressure [de-identified] : other [de-identified] : None [de-identified] : No [de-identified] : None [de-identified] : 100% [de-identified] : Ace wraps [de-identified] : 3x Weekly

## 2020-08-25 NOTE — PLAN
[FreeTextEntry1] : continue wound care and off loading , patient in sub acute , PTR 1 week for debridement of the left heel and possible apligraf in the future. Patient will need diabetic shoes with multi-density inserts and a left partial foot prosthesis to help prevent recurrence, sepsis, loss of limb, and loss of life.

## 2020-08-25 NOTE — ASSESSMENT
[Verbal] : Verbal [Demo] : Demo [Written] : Written [Patient] : Patient [Good - alert, interested, motivated] : Good - alert, interested, motivated [Verbalizes knowledge/Understanding] : Verbalizes knowledge/understanding [Dressing changes] : dressing changes [Signs and symptoms of infection] : sign and symptoms of infection [Skin Care] : skin care [Arterial Disease] : arterial disease [How and When to Call] : how and when to call [Pain Management] : pain management [Patient responsibility to plan of care] : patient responsibility to plan of care [Hospitalization] : hospitalization [Hyperbaric Therapy] : hyperbaric therapy [Glycemic Control] : glycemic control [Stable] : stable [LTC] : LTC [Wheelchair] : Wheelchair [Not Applicable - Long Term Care/Home Health Agency] : Long Term Care/Home Health Agency: Not Applicable [] : Yes [FreeTextEntry2] : Restore Skin Integrity\par Infection Control\par Localized wound care\par Maintain acceptable pain levels\par Demonstrates use of both nonpharmacological and pharmacological pain relief strategies [FreeTextEntry4] : Photos Taken/Geoffrey\par Pt met with Yan ([facility]) this visit and will follow up with him regarding receiving diabetic shoes with prosthesis for TMA site.\par DPM recommends debridement next visit and an apligraf the visit after that. Pre-auth for debridement submitted.\par F/U to [facility] in 1 week.\par **If pt remains a candidate for hbot please begin processing as of 08/23/20. [FreeTextEntry1] : [facility]- sent with Pt

## 2020-08-25 NOTE — ASSESSMENT
[] : No [FreeTextEntry4] : Auth submitted for 3rd Apligraf \par Follow up in 1 week \par 1 unit apligraf ordered on 08/24/20 for pts next aassessment. [FreeTextEntry2] : Skin substitute therapy \par Infection prevention\par Localized wound care \par Goal of remaining pain free regarding wounds.\par Offloading

## 2020-08-25 NOTE — PROCEDURE
[Saline] : saline [Scalpel] : scalpel [Other: ___] : [unfilled] [Fenestrated] : fenestrated [Hydrated with saline] : hydrated with saline [Apligraf] : apligraf [____ % was used] : and [unfilled] % was used [____ % was discarded] : and [unfilled] % was discarded [FreeTextEntry1] : wound veil / alginate  [FreeTextEntry9] : 1005hr [] : No [FreeTextEntry6] : DFU 3 posterior left heel  [de-identified] : Geoffrey [de-identified] : DFU 3 posterior left heel  [FreeTextEntry7] : same [de-identified] : 2cc [de-identified] : 0 [de-identified] : tiny

## 2020-08-27 ENCOUNTER — APPOINTMENT (OUTPATIENT)
Dept: PODIATRY | Facility: HOSPITAL | Age: 50
End: 2020-08-27
Payer: MEDICAID

## 2020-08-27 ENCOUNTER — OUTPATIENT (OUTPATIENT)
Dept: OUTPATIENT SERVICES | Facility: HOSPITAL | Age: 50
LOS: 1 days | Discharge: HOME | End: 2020-08-27
Payer: COMMERCIAL

## 2020-08-27 VITALS
SYSTOLIC BLOOD PRESSURE: 133 MMHG | HEIGHT: 74 IN | OXYGEN SATURATION: 98 % | RESPIRATION RATE: 20 BRPM | BODY MASS INDEX: 27.59 KG/M2 | TEMPERATURE: 98.6 F | DIASTOLIC BLOOD PRESSURE: 79 MMHG | HEART RATE: 66 BPM | WEIGHT: 215 LBS

## 2020-08-27 DIAGNOSIS — E56.9 VITAMIN DEFICIENCY, UNSPECIFIED: ICD-10-CM

## 2020-08-27 DIAGNOSIS — L97.521 NON-PRESSURE CHRONIC ULCER OF OTHER PART OF LEFT FOOT LIMITED TO BREAKDOWN OF SKIN: ICD-10-CM

## 2020-08-27 DIAGNOSIS — Z98.890 OTHER SPECIFIED POSTPROCEDURAL STATES: Chronic | ICD-10-CM

## 2020-08-27 DIAGNOSIS — Z79.01 LONG TERM (CURRENT) USE OF ANTICOAGULANTS: ICD-10-CM

## 2020-08-27 DIAGNOSIS — I25.2 OLD MYOCARDIAL INFARCTION: ICD-10-CM

## 2020-08-27 DIAGNOSIS — K25.5 CHRONIC OR UNSPECIFIED GASTRIC ULCER WITH PERFORATION: Chronic | ICD-10-CM

## 2020-08-27 DIAGNOSIS — S98.912A COMPLETE TRAUMATIC AMPUTATION OF LEFT FOOT, LEVEL UNSPECIFIED, INITIAL ENCOUNTER: Chronic | ICD-10-CM

## 2020-08-27 DIAGNOSIS — E11.621 TYPE 2 DIABETES MELLITUS WITH FOOT ULCER: ICD-10-CM

## 2020-08-27 DIAGNOSIS — N40.1 BENIGN PROSTATIC HYPERPLASIA WITH LOWER URINARY TRACT SYMPTOMS: ICD-10-CM

## 2020-08-27 DIAGNOSIS — Z87.891 PERSONAL HISTORY OF NICOTINE DEPENDENCE: ICD-10-CM

## 2020-08-27 DIAGNOSIS — Z87.440 PERSONAL HISTORY OF URINARY (TRACT) INFECTIONS: ICD-10-CM

## 2020-08-27 DIAGNOSIS — K59.00 CONSTIPATION, UNSPECIFIED: ICD-10-CM

## 2020-08-27 DIAGNOSIS — Z89.432 ACQUIRED ABSENCE OF LEFT FOOT: ICD-10-CM

## 2020-08-27 DIAGNOSIS — L97.423 NON-PRESSURE CHRONIC ULCER OF LEFT HEEL AND MIDFOOT WITH NECROSIS OF MUSCLE: ICD-10-CM

## 2020-08-27 DIAGNOSIS — E11.40 TYPE 2 DIABETES MELLITUS WITH DIABETIC NEUROPATHY, UNSPECIFIED: ICD-10-CM

## 2020-08-27 DIAGNOSIS — E87.6 HYPOKALEMIA: ICD-10-CM

## 2020-08-27 DIAGNOSIS — Z91.013 ALLERGY TO SEAFOOD: ICD-10-CM

## 2020-08-27 DIAGNOSIS — Z79.899 OTHER LONG TERM (CURRENT) DRUG THERAPY: ICD-10-CM

## 2020-08-27 DIAGNOSIS — Z79.4 LONG TERM (CURRENT) USE OF INSULIN: ICD-10-CM

## 2020-08-27 DIAGNOSIS — I69.351 HEMIPLEGIA AND HEMIPARESIS FOLLOWING CEREBRAL INFARCTION AFFECTING RIGHT DOMINANT SIDE: ICD-10-CM

## 2020-08-27 DIAGNOSIS — Z79.82 LONG TERM (CURRENT) USE OF ASPIRIN: ICD-10-CM

## 2020-08-27 DIAGNOSIS — D64.9 ANEMIA, UNSPECIFIED: ICD-10-CM

## 2020-08-27 PROCEDURE — 15275 SKIN SUB GRAFT FACE/NK/HF/G: CPT | Mod: 79

## 2020-08-27 PROCEDURE — 15275 SKIN SUB GRAFT FACE/NK/HF/G: CPT

## 2020-09-03 ENCOUNTER — OUTPATIENT (OUTPATIENT)
Dept: OUTPATIENT SERVICES | Facility: HOSPITAL | Age: 50
LOS: 1 days | Discharge: HOME | End: 2020-09-03
Payer: COMMERCIAL

## 2020-09-03 ENCOUNTER — APPOINTMENT (OUTPATIENT)
Dept: PODIATRY | Facility: HOSPITAL | Age: 50
End: 2020-09-03
Payer: MEDICAID

## 2020-09-03 VITALS
DIASTOLIC BLOOD PRESSURE: 77 MMHG | TEMPERATURE: 98.1 F | HEART RATE: 69 BPM | RESPIRATION RATE: 20 BRPM | OXYGEN SATURATION: 98 % | SYSTOLIC BLOOD PRESSURE: 139 MMHG

## 2020-09-03 DIAGNOSIS — K25.5 CHRONIC OR UNSPECIFIED GASTRIC ULCER WITH PERFORATION: Chronic | ICD-10-CM

## 2020-09-03 DIAGNOSIS — Z98.890 OTHER SPECIFIED POSTPROCEDURAL STATES: Chronic | ICD-10-CM

## 2020-09-03 DIAGNOSIS — S98.912A COMPLETE TRAUMATIC AMPUTATION OF LEFT FOOT, LEVEL UNSPECIFIED, INITIAL ENCOUNTER: Chronic | ICD-10-CM

## 2020-09-03 DIAGNOSIS — L97.521 NON-PRESSURE CHRONIC ULCER OF OTHER PART OF LEFT FOOT LIMITED TO BREAKDOWN OF SKIN: ICD-10-CM

## 2020-09-03 DIAGNOSIS — E11.621 TYPE 2 DIABETES MELLITUS WITH FOOT ULCER: ICD-10-CM

## 2020-09-03 PROCEDURE — 15275 SKIN SUB GRAFT FACE/NK/HF/G: CPT | Mod: 79

## 2020-09-03 PROCEDURE — 15275 SKIN SUB GRAFT FACE/NK/HF/G: CPT

## 2020-09-07 DIAGNOSIS — Z79.899 OTHER LONG TERM (CURRENT) DRUG THERAPY: ICD-10-CM

## 2020-09-07 DIAGNOSIS — I25.2 OLD MYOCARDIAL INFARCTION: ICD-10-CM

## 2020-09-07 DIAGNOSIS — N40.1 BENIGN PROSTATIC HYPERPLASIA WITH LOWER URINARY TRACT SYMPTOMS: ICD-10-CM

## 2020-09-07 DIAGNOSIS — K59.00 CONSTIPATION, UNSPECIFIED: ICD-10-CM

## 2020-09-07 DIAGNOSIS — E56.9 VITAMIN DEFICIENCY, UNSPECIFIED: ICD-10-CM

## 2020-09-07 DIAGNOSIS — Z79.01 LONG TERM (CURRENT) USE OF ANTICOAGULANTS: ICD-10-CM

## 2020-09-07 DIAGNOSIS — E11.621 TYPE 2 DIABETES MELLITUS WITH FOOT ULCER: ICD-10-CM

## 2020-09-07 DIAGNOSIS — Z87.440 PERSONAL HISTORY OF URINARY (TRACT) INFECTIONS: ICD-10-CM

## 2020-09-07 DIAGNOSIS — E11.40 TYPE 2 DIABETES MELLITUS WITH DIABETIC NEUROPATHY, UNSPECIFIED: ICD-10-CM

## 2020-09-07 DIAGNOSIS — D64.9 ANEMIA, UNSPECIFIED: ICD-10-CM

## 2020-09-07 DIAGNOSIS — Z79.4 LONG TERM (CURRENT) USE OF INSULIN: ICD-10-CM

## 2020-09-07 DIAGNOSIS — L97.423 NON-PRESSURE CHRONIC ULCER OF LEFT HEEL AND MIDFOOT WITH NECROSIS OF MUSCLE: ICD-10-CM

## 2020-09-07 DIAGNOSIS — Z87.891 PERSONAL HISTORY OF NICOTINE DEPENDENCE: ICD-10-CM

## 2020-09-07 DIAGNOSIS — Z91.013 ALLERGY TO SEAFOOD: ICD-10-CM

## 2020-09-07 DIAGNOSIS — E87.6 HYPOKALEMIA: ICD-10-CM

## 2020-09-07 DIAGNOSIS — Z79.82 LONG TERM (CURRENT) USE OF ASPIRIN: ICD-10-CM

## 2020-09-07 DIAGNOSIS — Z89.432 ACQUIRED ABSENCE OF LEFT FOOT: ICD-10-CM

## 2020-09-07 DIAGNOSIS — I69.351 HEMIPLEGIA AND HEMIPARESIS FOLLOWING CEREBRAL INFARCTION AFFECTING RIGHT DOMINANT SIDE: ICD-10-CM

## 2020-09-08 NOTE — PROCEDURE
[Scalpel] : scalpel [Other: ___] : [unfilled] [Fenestrated] : fenestrated [____ % was used] : and [unfilled] % was used [Hydrated with saline] : hydrated with saline [Apligraf] : apligraf [____ % was discarded] : and [unfilled] % was discarded [FreeTextEntry1] : wound veil, alginate  [] : No [FreeTextEntry9] : 1030hr [de-identified] : DFU 3 posterior left heel  [FreeTextEntry7] : same  [de-identified] : Geoffrey [FreeTextEntry6] : DFU 3 posterior left heel  [de-identified] : 2cc [de-identified] : 0 [de-identified] : tiny

## 2020-09-08 NOTE — REVIEW OF SYSTEMS
[Skin Wound] : skin wound [Limb Weakness] : limb weakness [Easy Bleeding] : a tendency for easy bleeding [Fever] : no fever [Chills] : no chills [Eye Pain] : no eye pain [Loss Of Hearing] : no hearing loss [Earache] : no earache [Chest Pain] : no chest pain [Shortness Of Breath] : no shortness of breath [FreeTextEntry5] : htn, hld diabetic cardio vascular disease  [Anxiety] : no anxiety [Abdominal Pain] : no abdominal pain [FreeTextEntry9] : s/p left foot TMA w/achilles tenotomy [de-identified] : left foot dfu3 , s/p TMA   , healing well , left heel DFU 3 new , SSF , debrided today  [de-identified] : diabetic neuropathy [de-identified] : IDDM with neuropathy

## 2020-09-08 NOTE — VITALS
[Pain related to present condition?] : The patient's  pain is related to present condition. [Tender] : tender [Occasional] : occasional [] : No [de-identified] : 4/10           Acceptable pain tolerance levels deemed to be 3/10. [FreeTextEntry3] : Left heel  [FreeTextEntry4] : Lidocaine topical and padded dressing.  [FreeTextEntry1] : Offloading  [FreeTextEntry2] : Applying weight to area.

## 2020-09-08 NOTE — PHYSICAL EXAM
[Abdominal Pad] : Abdominal Pad [4 x 4] : 4 x 4  [1+] : left 1+ [Ankle Swelling (On Exam)] : present [Ankle Swelling On The Left] : moderate [Skin Ulcer] : ulcer [Alert] : alert [Oriented to Person] : oriented to person [Oriented to Place] : oriented to place [Calm] : calm [JVD] : no jugular venous distention  [Varicose Veins Of Lower Extremities] : not present [] : not present [de-identified] : calm [de-identified] : left foot TMA ,  , healed well , closed ,  DFU 3 posterior left heel SSF, smaller , granular  [de-identified] : s/p left TMA and Achilles tenotomy [de-identified] : IDDM with neuropathy  [de-identified] : Left heel  [de-identified] : 1.9 [de-identified] : 1.2 [de-identified] : 0.2-0.3 [de-identified] : Serous/sanguinous [de-identified] : 25% [de-identified] : Apligraf, woundveil, Alginate   [de-identified] : Expressed comfort post ACE application. [de-identified] : Apligraf  [de-identified] : Expressed comfort post ACE application. [de-identified] : Cleansed with NS\par Kerlix \par \par 1 unit of 44sq/cm applied\par 50% used    50% discarded \par ITM: [id number]\par LOT: [id number] 1A\par EXP: 9/5/20\par \par Irrigated with 0.9% normal saline \par LOT: [id number]\par EXP: 2/1/23 \par \par Small amount of bleeding during procedure. Patient reports no pain during or post procedure and tolerated well. [FreeTextEntry1] : Right leg  [de-identified] : *** JESÚS applied ACE wrap  [de-identified] : Moderate [de-identified] : Mild [de-identified] : None [de-identified] : Normal [de-identified] : >75% [de-identified] : Yes [de-identified] : 2.5% Lidocaine Topical [de-identified] : Ace wraps [de-identified] : Application of skin substitute [de-identified] : Adam [de-identified] : Ace wraps [de-identified] : Weekly [de-identified] : Primary Dressing [de-identified] : Daily [de-identified] : Compression

## 2020-09-08 NOTE — ASSESSMENT
[Written] : Written [Verbal] : Verbal [Demo] : Demo [Patient] : Patient [Needs reinforcement] : needs reinforcement [Good - alert, interested, motivated] : Good - alert, interested, motivated [Dressing changes] : dressing changes [Foot Care] : foot care [Signs and symptoms of infection] : sign and symptoms of infection [Skin Care] : skin care [How and When to Call] : how and when to call [Pain Management] : pain management [Off-loading] : off-loading [Patient responsibility to plan of care] : patient responsibility to plan of care [Stable] : stable [LTC] : LTC [Wheelchair] : Wheelchair [Not Applicable - Long Term Care/Home Health Agency] : Long Term Care/Home Health Agency: Not Applicable [FreeTextEntry4] : Auth submitted for 5th Apligraf \par Follow up in 1 week \par 1 unit apligarf ordered on 09/04/20 for pts next assessment [] : No [FreeTextEntry2] : Infection prevention\par Localized wound care \par Goal of remaining pain free regarding wounds.\par Acceptable pain tolerance levels deemed to be 3/10.\par Compression therapy \par Offloading\par Skin substitute therapy \par

## 2020-09-08 NOTE — REASON FOR VISIT
[Apligraf] : apligraf [FreeTextEntry5] : Left heel wound  [FreeTextEntry4] : DFU 3 posterior left heel , smaller and clean

## 2020-09-09 NOTE — PROCEDURE
[Saline] : saline [Scalpel] : scalpel [Skin] : skin [Other: ___] : [unfilled] [Hydrated with saline] : hydrated with saline [Fenestrated] : fenestrated [Apligraf] : apligraf [____ % was used] : and [unfilled] % was used [____ % was discarded] : and [unfilled] % was discarded [FreeTextEntry1] : wound veil / alginate  [] : No [de-identified] : DFU 3 posterior left heel  [FreeTextEntry9] : 199 [de-identified] : Geoffrey [FreeTextEntry6] : DFU 3 left heel  [de-identified] : 0 [FreeTextEntry7] : same  [de-identified] : 3cc [de-identified] : tiny

## 2020-09-09 NOTE — PHYSICAL EXAM
[4 x 4] : 4 x 4  [Abdominal Pad] : Abdominal Pad [1+] : right 1+ [Ankle Swelling (On Exam)] : present [Ankle Swelling On The Left] : moderate [Alert] : alert [Skin Ulcer] : ulcer [Oriented to Person] : oriented to person [Oriented to Place] : oriented to place [Calm] : calm [Varicose Veins Of Lower Extremities] : not present [] : not present [JVD] : no jugular venous distention  [de-identified] : calm [de-identified] : left foot TMA ,  , healed well , closed ,  DFU 3 posterior left heel SSF, smaller , granular  [de-identified] : s/p left TMA and Achilles tenotomy [de-identified] : IDDM with neuropathy  [FreeTextEntry1] : Left TMA site - Medial - Resolved [de-identified] : Scant Serous/sanguinous [de-identified] : White sock  [FreeTextEntry7] : Left foot TMA - Lateral - Resolved [de-identified] : Cleansed with Normal Saline. \par  [de-identified] : White sock  [de-identified] : Cleansed with Normal Saline. \par  [de-identified] : Scant Serous/sanguinous [de-identified] : \par  [de-identified] : 1.9 [de-identified] : Left heel  [de-identified] : Serous/sanguinous [de-identified] : 0.2-0.3 [de-identified] : 1.4 [de-identified] : Mild  [de-identified] : 25% [de-identified] : Apligraf  [de-identified] : Loose for support.  [de-identified] : Apligraf, Woundveil, calcium alginate [de-identified] : Normal [de-identified] : Cleansed with Normal Saline. \par Kerlix \par \par Post debridement measurements: \par 2.0 x 1.5 x 0.3\par  [de-identified] : None [de-identified] : None [de-identified] : No [de-identified] : 100% [de-identified] : Ace wraps [de-identified] : Normal [de-identified] : Compression [de-identified] : Daily [de-identified] : None [de-identified] : 100% [de-identified] : None [de-identified] : Daily [de-identified] : No [de-identified] : Compression [de-identified] : Erythema [de-identified] : Small [de-identified] : None [de-identified] : Mild [de-identified] : >75% [de-identified] : Adam [de-identified] : Yes [de-identified] : Ace wraps [de-identified] : Application of skin substitute [de-identified] : Weekly

## 2020-09-09 NOTE — ASSESSMENT
[Verbal] : Verbal [Written] : Written [Demo] : Demo [Patient] : Patient [Good - alert, interested, motivated] : Good - alert, interested, motivated [Verbalizes knowledge/Understanding] : Verbalizes knowledge/understanding [Dressing changes] : dressing changes [Foot Care] : foot care [Skin Care] : skin care [Signs and symptoms of infection] : sign and symptoms of infection [Nutrition] : nutrition [How and When to Call] : how and when to call [Pain Management] : pain management [Off-loading] : off-loading [Patient responsibility to plan of care] : patient responsibility to plan of care [] : Yes [Stable] : stable [LTC] : LTC [Wheelchair] : Wheelchair [Not Applicable - Long Term Care/Home Health Agency] : Long Term Care/Home Health Agency: Not Applicable [FreeTextEntry2] : Skin substitute therapy \par Infection prevention\par Localized wound care \par Goal of remaining pain free regarding wounds.\par Offloading [FreeTextEntry4] : Auth submitted for 4th Apligraf \par Follow up in 1 week \par 1 Unit of Apligraf 44(SqCm), Lot#- [id number],  Item#- [id number], Expiration- 8/29/2020, pH- 6.8-7.3, Reconstituted with Normal Saline lot#-  [id number], NS Expiration Date- 2/1/23, Applied to Left Heel, Percentage implanted- 75%, percentage discharged- 25% \par MD performed Dressing Change at today's Wound Care visit. \par \par 1 UNIT APLIGRAF ORDERED ON 08/28/20 FOR PTS NEXT ASSESSMENT.

## 2020-09-09 NOTE — REVIEW OF SYSTEMS
[Skin Wound] : skin wound [Limb Weakness] : limb weakness [Easy Bleeding] : a tendency for easy bleeding [Fever] : no fever [Earache] : no earache [Chills] : no chills [Eye Pain] : no eye pain [Chest Pain] : no chest pain [Loss Of Hearing] : no hearing loss [Anxiety] : no anxiety [Abdominal Pain] : no abdominal pain [Shortness Of Breath] : no shortness of breath [FreeTextEntry5] : htn, hld diabetic cardio vascular disease  [FreeTextEntry9] : s/p left foot TMA w/achilles tenotomy [de-identified] : left foot dfu3 , s/p TMA   , healing well , left heel DFU 3 new , SSF , debrided today  [de-identified] : diabetic neuropathy [de-identified] : IDDM with neuropathy

## 2020-09-10 ENCOUNTER — OUTPATIENT (OUTPATIENT)
Dept: OUTPATIENT SERVICES | Facility: HOSPITAL | Age: 50
LOS: 1 days | Discharge: HOME | End: 2020-09-10
Payer: COMMERCIAL

## 2020-09-10 ENCOUNTER — APPOINTMENT (OUTPATIENT)
Dept: PODIATRY | Facility: HOSPITAL | Age: 50
End: 2020-09-10
Payer: MEDICAID

## 2020-09-10 VITALS
BODY MASS INDEX: 27.59 KG/M2 | HEIGHT: 74 IN | SYSTOLIC BLOOD PRESSURE: 127 MMHG | RESPIRATION RATE: 20 BRPM | HEART RATE: 55 BPM | DIASTOLIC BLOOD PRESSURE: 71 MMHG | OXYGEN SATURATION: 97 % | TEMPERATURE: 97 F | WEIGHT: 215 LBS

## 2020-09-10 DIAGNOSIS — E11.621 TYPE 2 DIABETES MELLITUS WITH FOOT ULCER: ICD-10-CM

## 2020-09-10 DIAGNOSIS — K25.5 CHRONIC OR UNSPECIFIED GASTRIC ULCER WITH PERFORATION: Chronic | ICD-10-CM

## 2020-09-10 DIAGNOSIS — Z98.890 OTHER SPECIFIED POSTPROCEDURAL STATES: Chronic | ICD-10-CM

## 2020-09-10 DIAGNOSIS — S98.912A COMPLETE TRAUMATIC AMPUTATION OF LEFT FOOT, LEVEL UNSPECIFIED, INITIAL ENCOUNTER: Chronic | ICD-10-CM

## 2020-09-10 PROCEDURE — 15275 SKIN SUB GRAFT FACE/NK/HF/G: CPT

## 2020-09-10 PROCEDURE — 15275 SKIN SUB GRAFT FACE/NK/HF/G: CPT | Mod: 79

## 2020-09-13 DIAGNOSIS — Z79.01 LONG TERM (CURRENT) USE OF ANTICOAGULANTS: ICD-10-CM

## 2020-09-13 DIAGNOSIS — E56.9 VITAMIN DEFICIENCY, UNSPECIFIED: ICD-10-CM

## 2020-09-13 DIAGNOSIS — E87.6 HYPOKALEMIA: ICD-10-CM

## 2020-09-13 DIAGNOSIS — Z87.440 PERSONAL HISTORY OF URINARY (TRACT) INFECTIONS: ICD-10-CM

## 2020-09-13 DIAGNOSIS — E11.40 TYPE 2 DIABETES MELLITUS WITH DIABETIC NEUROPATHY, UNSPECIFIED: ICD-10-CM

## 2020-09-13 DIAGNOSIS — L97.423 NON-PRESSURE CHRONIC ULCER OF LEFT HEEL AND MIDFOOT WITH NECROSIS OF MUSCLE: ICD-10-CM

## 2020-09-13 DIAGNOSIS — Z91.013 ALLERGY TO SEAFOOD: ICD-10-CM

## 2020-09-13 DIAGNOSIS — I25.2 OLD MYOCARDIAL INFARCTION: ICD-10-CM

## 2020-09-13 DIAGNOSIS — N40.1 BENIGN PROSTATIC HYPERPLASIA WITH LOWER URINARY TRACT SYMPTOMS: ICD-10-CM

## 2020-09-13 DIAGNOSIS — Z79.899 OTHER LONG TERM (CURRENT) DRUG THERAPY: ICD-10-CM

## 2020-09-13 DIAGNOSIS — Z79.82 LONG TERM (CURRENT) USE OF ASPIRIN: ICD-10-CM

## 2020-09-13 DIAGNOSIS — D64.9 ANEMIA, UNSPECIFIED: ICD-10-CM

## 2020-09-13 DIAGNOSIS — I69.351 HEMIPLEGIA AND HEMIPARESIS FOLLOWING CEREBRAL INFARCTION AFFECTING RIGHT DOMINANT SIDE: ICD-10-CM

## 2020-09-13 DIAGNOSIS — Z79.4 LONG TERM (CURRENT) USE OF INSULIN: ICD-10-CM

## 2020-09-13 DIAGNOSIS — E11.621 TYPE 2 DIABETES MELLITUS WITH FOOT ULCER: ICD-10-CM

## 2020-09-13 DIAGNOSIS — K59.00 CONSTIPATION, UNSPECIFIED: ICD-10-CM

## 2020-09-13 DIAGNOSIS — Z87.891 PERSONAL HISTORY OF NICOTINE DEPENDENCE: ICD-10-CM

## 2020-09-13 DIAGNOSIS — Z89.432 ACQUIRED ABSENCE OF LEFT FOOT: ICD-10-CM

## 2020-09-15 NOTE — REASON FOR VISIT
[Apligraf] : apligraf [FreeTextEntry5] : Left Heel  [FreeTextEntry4] : DFU 3 posterior left heel  [FreeTextEntry3] : apligraf

## 2020-09-15 NOTE — PHYSICAL EXAM
[4 x 4] : 4 x 4  [Abdominal Pad] : Abdominal Pad [1+] : left 1+ [Ankle Swelling (On Exam)] : present [Ankle Swelling On The Left] : of the left ankle [Skin Ulcer] : ulcer [Alert] : alert [Calm] : calm [Oriented to Person] : oriented to person [Oriented to Place] : oriented to place [JVD] : no jugular venous distention  [Varicose Veins Of Lower Extremities] : not present [] : not present [de-identified] : calm [FreeTextEntry1] : diabetic small vessel disease [de-identified] : s/p left TMA and Achilles tenotomy [de-identified] : left foot TMA ,  , healed well , closed ,  DFU 3 posterior left heel SSF, smaller , granular  [de-identified] : IDDM with neuropathy  [de-identified] : Bandage applied by physician \par 1 unit of Apligraf 44 (Sqcm)\par Lot # [id number]\par Item # [id number]\par Exp: 2020-09-12\par pH: 7.3-7.5\par Reconstituted with NS: Lot #: [id number]           Exp: [id number]\par Implanted\par 50% Implanted 50% Discarded\par  [de-identified] : Heel  [de-identified] : 1.9 [de-identified] : 1.1 [de-identified] : 0.2  [de-identified] : Serosanguineous  [de-identified] : 25% [de-identified] : Apligraf, Wound Veil and Calcium Alginate  [de-identified] : Intact [de-identified] : Cleansed with Normal Saline \par Kerlix \par Post Debridement Measurement:\par 2.0 x 1.2 x 0.3\par  [de-identified] : Left [de-identified] : Moderate [de-identified] : No [de-identified] : No [de-identified] : Mild [de-identified] : None [de-identified] : >75% [de-identified] : Yes [de-identified] : Adam [de-identified] : Weekly

## 2020-09-15 NOTE — REVIEW OF SYSTEMS
[Skin Wound] : skin wound [Limb Weakness] : limb weakness [Easy Bleeding] : a tendency for easy bleeding [FreeTextEntry1] : 8820 [Fever] : no fever [Eye Pain] : no eye pain [Chills] : no chills [Earache] : no earache [Loss Of Hearing] : no hearing loss [Chest Pain] : no chest pain [Shortness Of Breath] : no shortness of breath [Abdominal Pain] : no abdominal pain [Anxiety] : no anxiety [de-identified] : left foot dfu3 , s/p TMA   , healing well , left heel DFU 3 new , SSF , debrided today  [FreeTextEntry9] : s/p left foot TMA w/achilles tenotomy [FreeTextEntry5] : htn, hld diabetic cardio vascular disease  [de-identified] : diabetic neuropathy [de-identified] : IDDM with neuropathy

## 2020-09-15 NOTE — PROCEDURE
[Saline] : saline [Scalpel] : scalpel [Other: ___] : [unfilled] [Fenestrated] : fenestrated [Hydrated with saline] : hydrated with saline [Apligraf] : apligraf [____ % was used] : and [unfilled] % was used [____ % was discarded] : and [unfilled] % was discarded [FreeTextEntry1] : wound veil / alginate  [] : No [FreeTextEntry9] : 8715 [de-identified] : DFU 3 posterior left heel  [de-identified] : Geoffrey [FreeTextEntry6] : DFU 3 posterior left heel  [FreeTextEntry7] : same [de-identified] : 2cc [de-identified] : 0 [de-identified] : tiny

## 2020-09-15 NOTE — ASSESSMENT
[Verbal] : Verbal [Written] : Written [Patient] : Patient [Good - alert, interested, motivated] : Good - alert, interested, motivated [Verbalizes knowledge/Understanding] : Verbalizes knowledge/understanding [Dressing changes] : dressing changes [Foot Care] : foot care [Pressure relief] : pressure relief [Skin Care] : skin care [Signs and symptoms of infection] : sign and symptoms of infection [How and When to Call] : how and when to call [Off-loading] : off-loading [Compression Therapy] : compression therapy [Patient responsibility to plan of care] : patient responsibility to plan of care [Glycemic Control] : glycemic control [] : Yes [Stable] : stable [Wheelchair] : Wheelchair [LTC] : LTC [Not Applicable - Long Term Care/Home Health Agency] : Long Term Care/Home Health Agency: Not Applicable [FreeTextEntry4] : Vital Signs were obtained at 1040 but were entered into EM[facility] at later time due to complications with computer \par F/U in week for application of Apligraf #6\par Auth submitted for Apligraf #6\par IMS consult done today\par 1 unit apligraf ordered on 09/11/20 for pts next assessment [FreeTextEntry2] : Infection Prevention\par Localized Wound Care\par Promote Optimal Skin Integrity \par \par

## 2020-09-17 ENCOUNTER — APPOINTMENT (OUTPATIENT)
Dept: PODIATRY | Facility: HOSPITAL | Age: 50
End: 2020-09-17
Payer: MEDICAID

## 2020-09-17 ENCOUNTER — OUTPATIENT (OUTPATIENT)
Dept: OUTPATIENT SERVICES | Facility: HOSPITAL | Age: 50
LOS: 1 days | Discharge: HOME | End: 2020-09-17
Payer: COMMERCIAL

## 2020-09-17 VITALS
BODY MASS INDEX: 27.59 KG/M2 | RESPIRATION RATE: 20 BRPM | TEMPERATURE: 98 F | OXYGEN SATURATION: 97 % | HEART RATE: 59 BPM | SYSTOLIC BLOOD PRESSURE: 142 MMHG | HEIGHT: 74 IN | DIASTOLIC BLOOD PRESSURE: 77 MMHG | WEIGHT: 215 LBS

## 2020-09-17 DIAGNOSIS — S98.912A COMPLETE TRAUMATIC AMPUTATION OF LEFT FOOT, LEVEL UNSPECIFIED, INITIAL ENCOUNTER: Chronic | ICD-10-CM

## 2020-09-17 DIAGNOSIS — K25.5 CHRONIC OR UNSPECIFIED GASTRIC ULCER WITH PERFORATION: Chronic | ICD-10-CM

## 2020-09-17 DIAGNOSIS — Z98.890 OTHER SPECIFIED POSTPROCEDURAL STATES: Chronic | ICD-10-CM

## 2020-09-17 PROCEDURE — 15275 SKIN SUB GRAFT FACE/NK/HF/G: CPT

## 2020-09-18 DIAGNOSIS — D64.9 ANEMIA, UNSPECIFIED: ICD-10-CM

## 2020-09-18 DIAGNOSIS — E11.621 TYPE 2 DIABETES MELLITUS WITH FOOT ULCER: ICD-10-CM

## 2020-09-18 DIAGNOSIS — L97.423 NON-PRESSURE CHRONIC ULCER OF LEFT HEEL AND MIDFOOT WITH NECROSIS OF MUSCLE: ICD-10-CM

## 2020-09-18 DIAGNOSIS — K59.00 CONSTIPATION, UNSPECIFIED: ICD-10-CM

## 2020-09-18 DIAGNOSIS — Z89.432 ACQUIRED ABSENCE OF LEFT FOOT: ICD-10-CM

## 2020-09-18 DIAGNOSIS — Z79.899 OTHER LONG TERM (CURRENT) DRUG THERAPY: ICD-10-CM

## 2020-09-18 DIAGNOSIS — Z79.4 LONG TERM (CURRENT) USE OF INSULIN: ICD-10-CM

## 2020-09-18 DIAGNOSIS — Z87.440 PERSONAL HISTORY OF URINARY (TRACT) INFECTIONS: ICD-10-CM

## 2020-09-18 DIAGNOSIS — Z87.891 PERSONAL HISTORY OF NICOTINE DEPENDENCE: ICD-10-CM

## 2020-09-18 DIAGNOSIS — E87.6 HYPOKALEMIA: ICD-10-CM

## 2020-09-18 DIAGNOSIS — N40.1 BENIGN PROSTATIC HYPERPLASIA WITH LOWER URINARY TRACT SYMPTOMS: ICD-10-CM

## 2020-09-18 DIAGNOSIS — Z91.013 ALLERGY TO SEAFOOD: ICD-10-CM

## 2020-09-18 DIAGNOSIS — Z79.82 LONG TERM (CURRENT) USE OF ASPIRIN: ICD-10-CM

## 2020-09-18 DIAGNOSIS — Z56.9 UNSPECIFIED PROBLEMS RELATED TO EMPLOYMENT: ICD-10-CM

## 2020-09-18 DIAGNOSIS — E11.40 TYPE 2 DIABETES MELLITUS WITH DIABETIC NEUROPATHY, UNSPECIFIED: ICD-10-CM

## 2020-09-18 DIAGNOSIS — I69.351 HEMIPLEGIA AND HEMIPARESIS FOLLOWING CEREBRAL INFARCTION AFFECTING RIGHT DOMINANT SIDE: ICD-10-CM

## 2020-09-18 DIAGNOSIS — Z79.01 LONG TERM (CURRENT) USE OF ANTICOAGULANTS: ICD-10-CM

## 2020-09-18 DIAGNOSIS — I25.2 OLD MYOCARDIAL INFARCTION: ICD-10-CM

## 2020-09-18 SDOH — ECONOMIC STABILITY - INCOME SECURITY: UNSPECIFIED PROBLEMS RELATED TO EMPLOYMENT: Z56.9

## 2020-09-24 ENCOUNTER — APPOINTMENT (OUTPATIENT)
Dept: PODIATRY | Facility: HOSPITAL | Age: 50
End: 2020-09-24

## 2020-09-26 ENCOUNTER — APPOINTMENT (OUTPATIENT)
Dept: PLASTIC SURGERY | Facility: HOSPITAL | Age: 50
End: 2020-09-26
Payer: MEDICAID

## 2020-09-26 ENCOUNTER — OUTPATIENT (OUTPATIENT)
Dept: OUTPATIENT SERVICES | Facility: HOSPITAL | Age: 50
LOS: 1 days | Discharge: HOME | End: 2020-09-26
Payer: COMMERCIAL

## 2020-09-26 VITALS
SYSTOLIC BLOOD PRESSURE: 142 MMHG | TEMPERATURE: 97 F | OXYGEN SATURATION: 97 % | DIASTOLIC BLOOD PRESSURE: 74 MMHG | WEIGHT: 215 LBS | RESPIRATION RATE: 20 BRPM | BODY MASS INDEX: 27.59 KG/M2 | HEIGHT: 74 IN | HEART RATE: 67 BPM

## 2020-09-26 DIAGNOSIS — L97.423 NON-PRESSURE CHRONIC ULCER OF LEFT HEEL AND MIDFOOT WITH NECROSIS OF MUSCLE: ICD-10-CM

## 2020-09-26 DIAGNOSIS — D64.9 ANEMIA, UNSPECIFIED: ICD-10-CM

## 2020-09-26 DIAGNOSIS — Z79.899 OTHER LONG TERM (CURRENT) DRUG THERAPY: ICD-10-CM

## 2020-09-26 DIAGNOSIS — K25.5 CHRONIC OR UNSPECIFIED GASTRIC ULCER WITH PERFORATION: Chronic | ICD-10-CM

## 2020-09-26 DIAGNOSIS — E11.621 TYPE 2 DIABETES MELLITUS WITH FOOT ULCER: ICD-10-CM

## 2020-09-26 DIAGNOSIS — E87.6 HYPOKALEMIA: ICD-10-CM

## 2020-09-26 DIAGNOSIS — N40.1 BENIGN PROSTATIC HYPERPLASIA WITH LOWER URINARY TRACT SYMPTOMS: ICD-10-CM

## 2020-09-26 DIAGNOSIS — K59.00 CONSTIPATION, UNSPECIFIED: ICD-10-CM

## 2020-09-26 DIAGNOSIS — I69.351 HEMIPLEGIA AND HEMIPARESIS FOLLOWING CEREBRAL INFARCTION AFFECTING RIGHT DOMINANT SIDE: ICD-10-CM

## 2020-09-26 DIAGNOSIS — Z79.82 LONG TERM (CURRENT) USE OF ASPIRIN: ICD-10-CM

## 2020-09-26 DIAGNOSIS — Z79.01 LONG TERM (CURRENT) USE OF ANTICOAGULANTS: ICD-10-CM

## 2020-09-26 DIAGNOSIS — E56.9 VITAMIN DEFICIENCY, UNSPECIFIED: ICD-10-CM

## 2020-09-26 DIAGNOSIS — Z89.432 ACQUIRED ABSENCE OF LEFT FOOT: ICD-10-CM

## 2020-09-26 DIAGNOSIS — Z87.440 PERSONAL HISTORY OF URINARY (TRACT) INFECTIONS: ICD-10-CM

## 2020-09-26 DIAGNOSIS — Z79.4 LONG TERM (CURRENT) USE OF INSULIN: ICD-10-CM

## 2020-09-26 DIAGNOSIS — Z87.891 PERSONAL HISTORY OF NICOTINE DEPENDENCE: ICD-10-CM

## 2020-09-26 DIAGNOSIS — I25.2 OLD MYOCARDIAL INFARCTION: ICD-10-CM

## 2020-09-26 DIAGNOSIS — Z91.013 ALLERGY TO SEAFOOD: ICD-10-CM

## 2020-09-26 DIAGNOSIS — S98.912A COMPLETE TRAUMATIC AMPUTATION OF LEFT FOOT, LEVEL UNSPECIFIED, INITIAL ENCOUNTER: Chronic | ICD-10-CM

## 2020-09-26 DIAGNOSIS — Z98.890 OTHER SPECIFIED POSTPROCEDURAL STATES: Chronic | ICD-10-CM

## 2020-09-26 DIAGNOSIS — E11.40 TYPE 2 DIABETES MELLITUS WITH DIABETIC NEUROPATHY, UNSPECIFIED: ICD-10-CM

## 2020-09-26 PROCEDURE — 99213 OFFICE O/P EST LOW 20 MIN: CPT

## 2020-09-26 PROCEDURE — G0463: CPT

## 2020-09-29 NOTE — PROCEDURE
[Saline] : saline [Scalpel] : scalpel [Other: ___] : [unfilled] [Fenestrated] : fenestrated [Hydrated with saline] : hydrated with saline [Apligraf] : apligraf [____ % was used] : and [unfilled] % was used [____ % was discarded] : and [unfilled] % was discarded [FreeTextEntry1] : wound veil / alginate  [] : No [FreeTextEntry9] : 212 [de-identified] : DFU 3 posterior left heel  [de-identified] : Geoffrey [FreeTextEntry6] : DFU 3 posterior left heel  [FreeTextEntry7] : same  [de-identified] : 0 [de-identified] : 2cc [de-identified] : tiny

## 2020-09-29 NOTE — ASSESSMENT
[Verbal] : Verbal [Written] : Written [Patient] : Patient [Good - alert, interested, motivated] : Good - alert, interested, motivated [Verbalizes knowledge/Understanding] : Verbalizes knowledge/understanding [Dressing changes] : dressing changes [Foot Care] : foot care [Skin Care] : skin care [Pressure relief] : pressure relief [Signs and symptoms of infection] : sign and symptoms of infection [How and When to Call] : how and when to call [Off-loading] : off-loading [Compression Therapy] : compression therapy [Patient responsibility to plan of care] : patient responsibility to plan of care [Glycemic Control] : glycemic control [Stable] : stable [LTC] : LTC [Wheelchair] : Wheelchair [Not Applicable - Long Term Care/Home Health Agency] : Long Term Care/Home Health Agency: Not Applicable [] : No [FreeTextEntry2] : Infection Prevention\par Localized Wound Care\par Promote Optimal Skin Integrity \par \par  [FreeTextEntry4] : F/U in week for application of Apligraf #7\par Auth submitted for Apligraf #7\par 1 unit apligraf ordered on 09/21/20 for pts next appt\par \par ***Addendum: ROS time scribed for physician in error. Correct ROS time is 9:12.

## 2020-09-29 NOTE — PHYSICAL EXAM
[4 x 4] : 4 x 4  [Abdominal Pad] : Abdominal Pad [1+] : left 1+ [Ankle Swelling (On Exam)] : present [Ankle Swelling On The Left] : moderate [Skin Ulcer] : ulcer [Alert] : alert [Oriented to Person] : oriented to person [Oriented to Place] : oriented to place [Calm] : calm [JVD] : no jugular venous distention  [Varicose Veins Of Lower Extremities] : not present [] : not present [de-identified] : calm [FreeTextEntry1] : diabetic small vessel disease [de-identified] : s/p left TMA and Achilles tenotomy [de-identified] : left foot TMA ,  , healed well , closed ,  DFU 3 posterior left heel SSF, smaller , granular  [de-identified] : IDDM with neuropathy  [de-identified] : Bandage applied by physician*** [de-identified] : Heel  [de-identified] : 1.5 [de-identified] : 1.1 [de-identified] : 0.2  [de-identified] : Serosanguineous  [de-identified] : Intact [de-identified] : 25% [de-identified] : Apligraf, Wound Veil and Calcium Alginate  [de-identified] : Cleansed with Normal Saline \par Kerlix \par Post Debridement Measurement:\par 1.6 x 1.2 x 0.2\par \par \par 1 unit of Apligraf 44 (Sqcm)\par Lot # [id number]\par Item # [id number]\par Exp:09/24/2020\par pH: 7.3-7.5\par \par Reconstituted with NS: \par Lot #: [id number]           \par Exp: [id number]\par Implanted\par 50% Implanted 50% Discarded\par \par Pt tolerated procedure well\par \par  [de-identified] : Left [de-identified] : Moderate [de-identified] : No [de-identified] : No [de-identified] : Mild [de-identified] : None [de-identified] : >75% [de-identified] : Yes [de-identified] : Adam [de-identified] : Weekly

## 2020-09-29 NOTE — REVIEW OF SYSTEMS
[Skin Wound] : skin wound [Limb Weakness] : limb weakness [Easy Bleeding] : a tendency for easy bleeding [FreeTextEntry1] : 536 [Fever] : no fever [Chills] : no chills [Eye Pain] : no eye pain [Earache] : no earache [Loss Of Hearing] : no hearing loss [Chest Pain] : no chest pain [Shortness Of Breath] : no shortness of breath [Abdominal Pain] : no abdominal pain [Anxiety] : no anxiety [FreeTextEntry5] : htn, hld diabetic cardio vascular disease  [FreeTextEntry9] : s/p left foot TMA w/achilles tenotomy [de-identified] : left foot dfu3 , s/p TMA   , healing well , left heel DFU 3 new , SSF , debrided today  [de-identified] : diabetic neuropathy [de-identified] : IDDM with neuropathy

## 2020-09-30 ENCOUNTER — OUTPATIENT (OUTPATIENT)
Dept: OUTPATIENT SERVICES | Facility: HOSPITAL | Age: 50
LOS: 1 days | Discharge: HOME | End: 2020-09-30
Payer: COMMERCIAL

## 2020-09-30 ENCOUNTER — APPOINTMENT (OUTPATIENT)
Dept: PODIATRY | Facility: HOSPITAL | Age: 50
End: 2020-09-30
Payer: MEDICAID

## 2020-09-30 VITALS
HEIGHT: 74 IN | SYSTOLIC BLOOD PRESSURE: 139 MMHG | OXYGEN SATURATION: 96 % | RESPIRATION RATE: 20 BRPM | WEIGHT: 215 LBS | TEMPERATURE: 97.2 F | DIASTOLIC BLOOD PRESSURE: 77 MMHG | HEART RATE: 63 BPM | BODY MASS INDEX: 27.59 KG/M2

## 2020-09-30 DIAGNOSIS — S98.912A COMPLETE TRAUMATIC AMPUTATION OF LEFT FOOT, LEVEL UNSPECIFIED, INITIAL ENCOUNTER: Chronic | ICD-10-CM

## 2020-09-30 DIAGNOSIS — Z98.890 OTHER SPECIFIED POSTPROCEDURAL STATES: Chronic | ICD-10-CM

## 2020-09-30 DIAGNOSIS — E11.621 TYPE 2 DIABETES MELLITUS WITH FOOT ULCER: ICD-10-CM

## 2020-09-30 DIAGNOSIS — K25.5 CHRONIC OR UNSPECIFIED GASTRIC ULCER WITH PERFORATION: Chronic | ICD-10-CM

## 2020-09-30 PROCEDURE — 15275 SKIN SUB GRAFT FACE/NK/HF/G: CPT

## 2020-09-30 NOTE — PHYSICAL EXAM
[4 x 4] : 4 x 4  [Abdominal Pad] : Abdominal Pad [1+] : left 1+ [Ankle Swelling (On Exam)] : present [Ankle Swelling On The Left] : moderate [Skin Ulcer] : ulcer [Alert] : alert [Oriented to Person] : oriented to person [Oriented to Place] : oriented to place [Calm] : calm [JVD] : no jugular venous distention  [Varicose Veins Of Lower Extremities] : not present [] : not present [de-identified] : calm [de-identified] : htn, hld, diabetic cardiovascular disease [de-identified] : s/p left TMA and Achilles tenotomy [de-identified] : left foot TMA ,  , healing well , no necrosis , new DFU 3 posterior left heel SSF [de-identified] : diminished light touch sensation [FreeTextEntry1] : Left heel [FreeTextEntry2] : 1.3 [FreeTextEntry3] : 0.8 [FreeTextEntry4] : 0.2 [de-identified] : serosanguineous [de-identified] : intact [de-identified] : none [de-identified] : 75% [de-identified] : 25% [de-identified] : loose ace to secure the dressing [de-identified] : silver alginate [de-identified] : NSC [TWNoteComboBox4] : Small [de-identified] : Every other day

## 2020-09-30 NOTE — REVIEW OF SYSTEMS
[Skin Wound] : skin wound [Limb Weakness] : limb weakness [Easy Bleeding] : a tendency for easy bleeding [Fever] : no fever [Chills] : no chills [Eye Pain] : no eye pain [Earache] : no earache [Loss Of Hearing] : no hearing loss [Chest Pain] : no chest pain [Shortness Of Breath] : no shortness of breath [Abdominal Pain] : no abdominal pain [Anxiety] : no anxiety [FreeTextEntry5] : htn, hld diabetic cardio vascular disease  [FreeTextEntry9] : s/p left foot TMA w/achilles tenotomy [de-identified] : left foot dfu3 , s/p TMA  sutures removed , healing well , left heel DFU 3 new , SSF [de-identified] : diabetic neuropathy [de-identified] : IDDM with neuropathy

## 2020-09-30 NOTE — HISTORY OF PRESENT ILLNESS
[FreeTextEntry1] : S/p TMA left foot , and DFU 3 posterior left heel \par 9/26/20 wound with some slough and smaller since last visir

## 2020-09-30 NOTE — VITALS
[] : No [de-identified] : Pain scale:  0/10 - Patient reports no c/o pains or discomforts at present.

## 2020-09-30 NOTE — PHYSICAL EXAM
[4 x 4] : 4 x 4  [Abdominal Pad] : Abdominal Pad [1+] : left 1+ [Ankle Swelling (On Exam)] : present [Ankle Swelling On The Left] : moderate [Skin Ulcer] : ulcer [Alert] : alert [Oriented to Person] : oriented to person [Oriented to Place] : oriented to place [Calm] : calm [JVD] : no jugular venous distention  [Varicose Veins Of Lower Extremities] : not present [] : not present [de-identified] : calm [de-identified] : htn, hld, diabetic cardiovascular disease [de-identified] : s/p left TMA and Achilles tenotomy [de-identified] : left foot TMA ,  , healing well , no necrosis , new DFU 3 posterior left heel SSF [de-identified] : diminished light touch sensation [FreeTextEntry1] : Left heel [FreeTextEntry2] : 1.3 [FreeTextEntry3] : 0.8 [FreeTextEntry4] : 0.2 [de-identified] : serosanguineous [de-identified] : intact [de-identified] : none [de-identified] : 75% [de-identified] : 25% [de-identified] : loose ace to secure the dressing [de-identified] : silver alginate [de-identified] : NSC [TWNoteComboBox4] : Small [de-identified] : Every other day

## 2020-09-30 NOTE — REVIEW OF SYSTEMS
[Skin Wound] : skin wound [Limb Weakness] : limb weakness [Easy Bleeding] : a tendency for easy bleeding [Fever] : no fever [Chills] : no chills [Eye Pain] : no eye pain [Earache] : no earache [Loss Of Hearing] : no hearing loss [Chest Pain] : no chest pain [Shortness Of Breath] : no shortness of breath [Abdominal Pain] : no abdominal pain [Anxiety] : no anxiety [FreeTextEntry5] : htn, hld diabetic cardio vascular disease  [FreeTextEntry9] : s/p left foot TMA w/achilles tenotomy [de-identified] : left foot dfu3 , s/p TMA  sutures removed , healing well , left heel DFU 3 new , SSF [de-identified] : diabetic neuropathy [de-identified] : IDDM with neuropathy

## 2020-09-30 NOTE — ASSESSMENT
[Verbal] : Verbal [Written] : Written [Handout] : Handout [Patient] : Patient [Good - alert, interested, motivated] : Good - alert, interested, motivated [Verbalizes knowledge/Understanding] : Verbalizes knowledge/understanding [Dressing changes] : dressing changes [Foot Care] : foot care [Pressure relief] : pressure relief [Signs and symptoms of infection] : sign and symptoms of infection [How and When to Call] : how and when to call [Hyperbaric Therapy] : hyperbaric therapy [Off-loading] : off-loading [Patient responsibility to plan of care] : patient responsibility to plan of care [Glycemic Control] : glycemic control [] : Yes [Stable] : stable [Home] : Home [Wheelchair] : Wheelchair [FreeTextEntry2] : Promote optimal skin integrity, offloading, infection prevention\par  [FreeTextEntry4] : Patient reports that he cancelled his scheduled appointment for Apligraf on 9/24/20 due to transportation issues.  Apligraf was not available at time of visit today.  Patient to f/u on Weds for Apligraf.\par \par Patient reports that he has been in rehab for 92 days.  \par \par HBO orientation today.  Patient viewed video.  Patient signed consent and HBO safety check list.    Patient to be evaluated for HBO tx by DPM next visit.\par \par F/U on Weds [FreeTextEntry1] : [place]

## 2020-09-30 NOTE — PLAN
[FreeTextEntry1] : continue wound care and off loading. AgAlginate, DD, ace wrap. Return midweek for possible apligraf

## 2020-10-01 DIAGNOSIS — Z79.82 LONG TERM (CURRENT) USE OF ASPIRIN: ICD-10-CM

## 2020-10-01 DIAGNOSIS — Z87.891 PERSONAL HISTORY OF NICOTINE DEPENDENCE: ICD-10-CM

## 2020-10-01 DIAGNOSIS — Z79.899 OTHER LONG TERM (CURRENT) DRUG THERAPY: ICD-10-CM

## 2020-10-01 DIAGNOSIS — K59.00 CONSTIPATION, UNSPECIFIED: ICD-10-CM

## 2020-10-01 DIAGNOSIS — Z79.01 LONG TERM (CURRENT) USE OF ANTICOAGULANTS: ICD-10-CM

## 2020-10-01 DIAGNOSIS — Z79.4 LONG TERM (CURRENT) USE OF INSULIN: ICD-10-CM

## 2020-10-01 DIAGNOSIS — L97.422 NON-PRESSURE CHRONIC ULCER OF LEFT HEEL AND MIDFOOT WITH FAT LAYER EXPOSED: ICD-10-CM

## 2020-10-01 DIAGNOSIS — E56.9 VITAMIN DEFICIENCY, UNSPECIFIED: ICD-10-CM

## 2020-10-01 DIAGNOSIS — I25.2 OLD MYOCARDIAL INFARCTION: ICD-10-CM

## 2020-10-01 DIAGNOSIS — Z91.013 ALLERGY TO SEAFOOD: ICD-10-CM

## 2020-10-01 DIAGNOSIS — E11.40 TYPE 2 DIABETES MELLITUS WITH DIABETIC NEUROPATHY, UNSPECIFIED: ICD-10-CM

## 2020-10-01 DIAGNOSIS — E87.6 HYPOKALEMIA: ICD-10-CM

## 2020-10-01 DIAGNOSIS — D64.9 ANEMIA, UNSPECIFIED: ICD-10-CM

## 2020-10-01 DIAGNOSIS — Z89.432 ACQUIRED ABSENCE OF LEFT FOOT: ICD-10-CM

## 2020-10-01 DIAGNOSIS — I69.351 HEMIPLEGIA AND HEMIPARESIS FOLLOWING CEREBRAL INFARCTION AFFECTING RIGHT DOMINANT SIDE: ICD-10-CM

## 2020-10-01 DIAGNOSIS — E11.621 TYPE 2 DIABETES MELLITUS WITH FOOT ULCER: ICD-10-CM

## 2020-10-01 DIAGNOSIS — Z87.440 PERSONAL HISTORY OF URINARY (TRACT) INFECTIONS: ICD-10-CM

## 2020-10-01 DIAGNOSIS — N40.1 BENIGN PROSTATIC HYPERPLASIA WITH LOWER URINARY TRACT SYMPTOMS: ICD-10-CM

## 2020-10-06 NOTE — VITALS
[Pain related to present condition?] : The patient's  pain is related to present condition. [Dull] : dull [Aching] : aching [] : No [de-identified] : 3/10 [FreeTextEntry3] : Left Heel  [FreeTextEntry1] : Oxycodone  [FreeTextEntry2] : Pressure  [FreeTextEntry4] : None

## 2020-10-06 NOTE — REVIEW OF SYSTEMS
[Skin Wound] : skin wound [Limb Weakness] : limb weakness [Easy Bleeding] : a tendency for easy bleeding [FreeTextEntry1] : 8:58am [Fever] : no fever [Chills] : no chills [Eye Pain] : no eye pain [Earache] : no earache [Loss Of Hearing] : no hearing loss [Chest Pain] : no chest pain [Shortness Of Breath] : no shortness of breath [Abdominal Pain] : no abdominal pain [Anxiety] : no anxiety [FreeTextEntry5] : htn, hld diabetic cardio vascular disease  [FreeTextEntry9] : s/p left foot TMA w/achilles tenotomy [de-identified] :  , left heel DFU 3 , SSF ,  clean , granular , apligraf applied today  [de-identified] : diabetic neuropathy [de-identified] : IDDM with neuropathy

## 2020-10-06 NOTE — PHYSICAL EXAM
[4 x 4] : 4 x 4  [Abdominal Pad] : Abdominal Pad [1+] : left 1+ [Ankle Swelling (On Exam)] : present [Ankle Swelling On The Left] : moderate [Skin Ulcer] : ulcer [Alert] : alert [Oriented to Person] : oriented to person [Oriented to Place] : oriented to place [Calm] : calm [JVD] : no jugular venous distention  [Varicose Veins Of Lower Extremities] : not present [] : not present [de-identified] : calm [de-identified] : htn, hld, diabetic cardiovascular disease [de-identified] : s/p left TMA and Achilles tenotomy [de-identified] :  ,  DFU 3 posterior left heel SSF, smaller , granular  [de-identified] : IDDM with neuropathy  [de-identified] : post debridement measurements: 1.4 x 1.6x 0.4\par Application of dressing performed by DPM post procedure [FreeTextEntry1] :  heel [FreeTextEntry2] : 1. [FreeTextEntry3] : 1.5 [FreeTextEntry4] : 0.2-0.4 [de-identified] : serosanguineous [de-identified] : intact [de-identified] : 75% [de-identified] : 50% [de-identified] : loose ace to secure the dressing [de-identified] : Apligraf,Dermabond,Woundveil, Calcium Alginate  [de-identified] : Cleansed with Normal saline\par  [TWNoteComboBox1] : Left [TWNoteComboBox4] : Small [de-identified] : Mild [de-identified] : None [de-identified] : 50% [de-identified] : Yes [de-identified] : Every other day [de-identified] : Primary Dressing

## 2020-10-06 NOTE — PROCEDURE
[Saline] : saline [Scalpel] : scalpel [Other: ___] : [unfilled] [Fenestrated] : fenestrated [Hydrated with saline] : hydrated with saline [Apligraf] : apligraf [____ % was used] : and [unfilled] % was used [____ % was discarded] : and [unfilled] % was discarded [FreeTextEntry1] : wound veil / alginate  [FreeTextEntry9] : 9:20am

## 2020-10-06 NOTE — ASSESSMENT
[Written] : Written [Verbal] : Verbal [Handout] : Handout [Good - alert, interested, motivated] : Good - alert, interested, motivated [Patient] : Patient [Verbalizes knowledge/Understanding] : Verbalizes knowledge/understanding [Dressing changes] : dressing changes [Foot Care] : foot care [Signs and symptoms of infection] : sign and symptoms of infection [Pressure relief] : pressure relief [Hyperbaric Therapy] : hyperbaric therapy [How and When to Call] : how and when to call [Off-loading] : off-loading [Patient responsibility to plan of care] : patient responsibility to plan of care [Glycemic Control] : glycemic control [] : Yes [Stable] : stable [Home] : Home [Wheelchair] : Wheelchair [FreeTextEntry2] : Promote optimal skin integrity\par offloading\par infection prevention\par Apligraf\par sharps debridement\par F/U 1 week \par  [FreeTextEntry4] : 7th application of 1 unit of 44sqcm Apligraf\par LOT#: [id number], [id number], Exp: 10/3/20\par 100 % applied,  0 % discarded\par Reconstituted with 0.9%NS, LOT#: [id number] Exp: 02/01/23\par MD ordered 8th application of Apligraf to be applied at ext [facility] visit, Authorization submitted\par 1 UNIT APLIGRAF ORDERED ON 10/02/20 FOR PTS NEXT ASSESSMENT.\par Patient tolerated procedure well\par F/U 1 week  [FreeTextEntry1] : [place]

## 2020-10-07 ENCOUNTER — OUTPATIENT (OUTPATIENT)
Dept: OUTPATIENT SERVICES | Facility: HOSPITAL | Age: 50
LOS: 1 days | Discharge: HOME | End: 2020-10-07
Payer: COMMERCIAL

## 2020-10-07 ENCOUNTER — APPOINTMENT (OUTPATIENT)
Dept: PODIATRY | Facility: HOSPITAL | Age: 50
End: 2020-10-07
Payer: MEDICAID

## 2020-10-07 VITALS
RESPIRATION RATE: 20 BRPM | SYSTOLIC BLOOD PRESSURE: 131 MMHG | OXYGEN SATURATION: 95 % | TEMPERATURE: 98.4 F | HEART RATE: 62 BPM | DIASTOLIC BLOOD PRESSURE: 71 MMHG

## 2020-10-07 DIAGNOSIS — Z79.01 LONG TERM (CURRENT) USE OF ANTICOAGULANTS: ICD-10-CM

## 2020-10-07 DIAGNOSIS — S98.912A COMPLETE TRAUMATIC AMPUTATION OF LEFT FOOT, LEVEL UNSPECIFIED, INITIAL ENCOUNTER: Chronic | ICD-10-CM

## 2020-10-07 DIAGNOSIS — Z79.4 LONG TERM (CURRENT) USE OF INSULIN: ICD-10-CM

## 2020-10-07 DIAGNOSIS — D64.9 ANEMIA, UNSPECIFIED: ICD-10-CM

## 2020-10-07 DIAGNOSIS — I69.351 HEMIPLEGIA AND HEMIPARESIS FOLLOWING CEREBRAL INFARCTION AFFECTING RIGHT DOMINANT SIDE: ICD-10-CM

## 2020-10-07 DIAGNOSIS — Z98.890 OTHER SPECIFIED POSTPROCEDURAL STATES: Chronic | ICD-10-CM

## 2020-10-07 DIAGNOSIS — E56.9 VITAMIN DEFICIENCY, UNSPECIFIED: ICD-10-CM

## 2020-10-07 DIAGNOSIS — Z89.432 ACQUIRED ABSENCE OF LEFT FOOT: ICD-10-CM

## 2020-10-07 DIAGNOSIS — E11.621 TYPE 2 DIABETES MELLITUS WITH FOOT ULCER: ICD-10-CM

## 2020-10-07 DIAGNOSIS — E11.40 TYPE 2 DIABETES MELLITUS WITH DIABETIC NEUROPATHY, UNSPECIFIED: ICD-10-CM

## 2020-10-07 DIAGNOSIS — Z87.891 PERSONAL HISTORY OF NICOTINE DEPENDENCE: ICD-10-CM

## 2020-10-07 DIAGNOSIS — K25.5 CHRONIC OR UNSPECIFIED GASTRIC ULCER WITH PERFORATION: Chronic | ICD-10-CM

## 2020-10-07 DIAGNOSIS — L97.422 NON-PRESSURE CHRONIC ULCER OF LEFT HEEL AND MIDFOOT WITH FAT LAYER EXPOSED: ICD-10-CM

## 2020-10-07 DIAGNOSIS — I25.2 OLD MYOCARDIAL INFARCTION: ICD-10-CM

## 2020-10-07 DIAGNOSIS — K59.00 CONSTIPATION, UNSPECIFIED: ICD-10-CM

## 2020-10-07 DIAGNOSIS — E87.6 HYPOKALEMIA: ICD-10-CM

## 2020-10-07 DIAGNOSIS — Z79.82 LONG TERM (CURRENT) USE OF ASPIRIN: ICD-10-CM

## 2020-10-07 DIAGNOSIS — Z91.013 ALLERGY TO SEAFOOD: ICD-10-CM

## 2020-10-07 DIAGNOSIS — Z87.440 PERSONAL HISTORY OF URINARY (TRACT) INFECTIONS: ICD-10-CM

## 2020-10-07 DIAGNOSIS — Z79.899 OTHER LONG TERM (CURRENT) DRUG THERAPY: ICD-10-CM

## 2020-10-07 PROCEDURE — 99213 OFFICE O/P EST LOW 20 MIN: CPT

## 2020-10-07 PROCEDURE — G0463: CPT

## 2020-10-07 NOTE — REVIEW OF SYSTEMS
[Fever] : no fever [Chills] : no chills [Eye Pain] : no eye pain [Earache] : no earache [Loss Of Hearing] : no hearing loss [Chest Pain] : no chest pain [Shortness Of Breath] : no shortness of breath [Abdominal Pain] : no abdominal pain [Skin Wound] : skin wound [Limb Weakness] : limb weakness [Anxiety] : no anxiety [Easy Bleeding] : a tendency for easy bleeding [FreeTextEntry5] : htn, hld diabetic cardio vascular disease  [FreeTextEntry9] : s/p left foot TMA w/achilles tenotomy [de-identified] : left foot dfu3 , s/p TMA   , healing well , left heel DFU 3 new , SSF , hyper granular tissue  [de-identified] : diabetic neuropathy [de-identified] : IDDM with neuropathy

## 2020-10-07 NOTE — REVIEW OF SYSTEMS
[Fever] : no fever [Chills] : no chills [Eye Pain] : no eye pain [Earache] : no earache [Loss Of Hearing] : no hearing loss [Chest Pain] : no chest pain [Shortness Of Breath] : no shortness of breath [Abdominal Pain] : no abdominal pain [Skin Wound] : skin wound [Limb Weakness] : limb weakness [Anxiety] : no anxiety [Easy Bleeding] : a tendency for easy bleeding [FreeTextEntry5] : htn, hld diabetic cardio vascular disease  [FreeTextEntry9] : s/p left foot TMA w/achilles tenotomy [de-identified] : left foot dfu3 , s/p TMA   , healing well , left heel DFU 3 new , SSF , hyper granular tissue  [de-identified] : diabetic neuropathy [de-identified] : IDDM with neuropathy

## 2020-10-07 NOTE — PLAN
[FreeTextEntry1] : AgNo3 used on the posterior left DFU to reduce hypergranular tissue . PTR 1 week

## 2020-10-07 NOTE — PHYSICAL EXAM
[4 x 4] : 4 x 4  [Abdominal Pad] : Abdominal Pad [JVD] : no jugular venous distention  [1+] : left 1+ [Ankle Swelling (On Exam)] : present [Ankle Swelling On The Left] : moderate [Varicose Veins Of Lower Extremities] : not present [] : not present [Skin Ulcer] : ulcer [Alert] : alert [Oriented to Person] : oriented to person [Oriented to Place] : oriented to place [Calm] : calm [de-identified] : calm [de-identified] : htn, hld, diabetic cardiovascular disease [de-identified] : s/p left TMA and Achilles tenotomy [de-identified] : left foot TMA ,  , healed well , closed ,  DFU 3 posterior left heel SSF, smaller , granular  [de-identified] : IDDM with neuropathy  [FreeTextEntry1] : Left heel  [FreeTextEntry2] : 1.4 [FreeTextEntry3] : 1.2 [FreeTextEntry4] : 0.3 [de-identified] : Serous/sanguinous [de-identified] : With hypergranulation  [de-identified] : 15% [de-identified] : Expressed comfort post ACE application. [de-identified] : Silver alginate [de-identified] : Cleansed with NS\par Kerlix\par \par AgNo3 used\par *** Bandage applied by LUCIANM  [TWNoteComboBox4] : Moderate [de-identified] : Normal [de-identified] : None [de-identified] : None [de-identified] : >75% [de-identified] : Yes [de-identified] : Ace wraps [de-identified] : 3x Weekly [de-identified] : Primary Dressing

## 2020-10-07 NOTE — PHYSICAL EXAM
[4 x 4] : 4 x 4  [Abdominal Pad] : Abdominal Pad [JVD] : no jugular venous distention  [1+] : left 1+ [Ankle Swelling (On Exam)] : present [Ankle Swelling On The Left] : moderate [Varicose Veins Of Lower Extremities] : not present [] : not present [Skin Ulcer] : ulcer [Alert] : alert [Oriented to Person] : oriented to person [Oriented to Place] : oriented to place [Calm] : calm [de-identified] : calm [de-identified] : htn, hld, diabetic cardiovascular disease [de-identified] : s/p left TMA and Achilles tenotomy [de-identified] : left foot TMA ,  , healed well , closed ,  DFU 3 posterior left heel SSF, smaller , granular  [de-identified] : IDDM with neuropathy  [FreeTextEntry1] : Left heel  [FreeTextEntry2] : 1.4 [FreeTextEntry3] : 1.2 [FreeTextEntry4] : 0.3 [de-identified] : Serous/sanguinous [de-identified] : With hypergranulation  [de-identified] : 15% [de-identified] : Expressed comfort post ACE application. [de-identified] : Silver alginate [de-identified] : Cleansed with NS\par Kerlix\par \par AgNo3 used\par *** Bandage applied by LUCIANM  [TWNoteComboBox4] : Moderate [de-identified] : Normal [de-identified] : None [de-identified] : None [de-identified] : >75% [de-identified] : Yes [de-identified] : Ace wraps [de-identified] : 3x Weekly [de-identified] : Primary Dressing

## 2020-10-07 NOTE — ASSESSMENT
[Verbal] : Verbal [Written] : Written [Demo] : Demo [Good - alert, interested, motivated] : Good - alert, interested, motivated [Needs reinforcement] : needs reinforcement [Dressing changes] : dressing changes [Foot Care] : foot care [Skin Care] : skin care [Signs and symptoms of infection] : sign and symptoms of infection [Nutrition] : nutrition [How and When to Call] : how and when to call [Pain Management] : pain management [Off-loading] : off-loading [Compression Therapy] : compression therapy [Home Health] : home health [Patient responsibility to plan of care] : patient responsibility to plan of care [Glycemic Control] : glycemic control [] : Yes [Stable] : stable [LTC] : LTC [Wheelchair] : Wheelchair [Not Applicable - Long Term Care/Home Health Agency] : Long Term Care/Home Health Agency: Not Applicable [FreeTextEntry2] : Infection prevention\par Localized wound care \par Goal of remaining pain free regarding wounds.\par Low glycemic diet \par Offloading  [FreeTextEntry4] : Apligraf on hold due to hypergranulation \par Follow up in 1 week  [FreeTextEntry1] : Instructions sent with patient

## 2020-10-15 ENCOUNTER — APPOINTMENT (OUTPATIENT)
Dept: PODIATRY | Facility: HOSPITAL | Age: 50
End: 2020-10-15
Payer: MEDICAID

## 2020-10-15 ENCOUNTER — OUTPATIENT (OUTPATIENT)
Dept: OUTPATIENT SERVICES | Facility: HOSPITAL | Age: 50
LOS: 1 days | Discharge: HOME | End: 2020-10-15
Payer: COMMERCIAL

## 2020-10-15 VITALS
OXYGEN SATURATION: 96 % | WEIGHT: 215 LBS | TEMPERATURE: 98.5 F | RESPIRATION RATE: 20 BRPM | BODY MASS INDEX: 27.59 KG/M2 | SYSTOLIC BLOOD PRESSURE: 134 MMHG | HEIGHT: 74 IN | DIASTOLIC BLOOD PRESSURE: 78 MMHG | HEART RATE: 67 BPM

## 2020-10-15 DIAGNOSIS — E56.9 VITAMIN DEFICIENCY, UNSPECIFIED: ICD-10-CM

## 2020-10-15 DIAGNOSIS — K59.00 CONSTIPATION, UNSPECIFIED: ICD-10-CM

## 2020-10-15 DIAGNOSIS — I69.351 HEMIPLEGIA AND HEMIPARESIS FOLLOWING CEREBRAL INFARCTION AFFECTING RIGHT DOMINANT SIDE: ICD-10-CM

## 2020-10-15 DIAGNOSIS — Z79.4 LONG TERM (CURRENT) USE OF INSULIN: ICD-10-CM

## 2020-10-15 DIAGNOSIS — Z89.432 ACQUIRED ABSENCE OF LEFT FOOT: ICD-10-CM

## 2020-10-15 DIAGNOSIS — L97.422 NON-PRESSURE CHRONIC ULCER OF LEFT HEEL AND MIDFOOT WITH FAT LAYER EXPOSED: ICD-10-CM

## 2020-10-15 DIAGNOSIS — S98.912A COMPLETE TRAUMATIC AMPUTATION OF LEFT FOOT, LEVEL UNSPECIFIED, INITIAL ENCOUNTER: Chronic | ICD-10-CM

## 2020-10-15 DIAGNOSIS — Z98.890 OTHER SPECIFIED POSTPROCEDURAL STATES: Chronic | ICD-10-CM

## 2020-10-15 DIAGNOSIS — Z79.899 OTHER LONG TERM (CURRENT) DRUG THERAPY: ICD-10-CM

## 2020-10-15 DIAGNOSIS — Z91.013 ALLERGY TO SEAFOOD: ICD-10-CM

## 2020-10-15 DIAGNOSIS — N40.1 BENIGN PROSTATIC HYPERPLASIA WITH LOWER URINARY TRACT SYMPTOMS: ICD-10-CM

## 2020-10-15 DIAGNOSIS — D64.9 ANEMIA, UNSPECIFIED: ICD-10-CM

## 2020-10-15 DIAGNOSIS — Z79.01 LONG TERM (CURRENT) USE OF ANTICOAGULANTS: ICD-10-CM

## 2020-10-15 DIAGNOSIS — K25.5 CHRONIC OR UNSPECIFIED GASTRIC ULCER WITH PERFORATION: Chronic | ICD-10-CM

## 2020-10-15 DIAGNOSIS — E11.621 TYPE 2 DIABETES MELLITUS WITH FOOT ULCER: ICD-10-CM

## 2020-10-15 DIAGNOSIS — Z79.82 LONG TERM (CURRENT) USE OF ASPIRIN: ICD-10-CM

## 2020-10-15 DIAGNOSIS — Z87.891 PERSONAL HISTORY OF NICOTINE DEPENDENCE: ICD-10-CM

## 2020-10-15 DIAGNOSIS — E11.40 TYPE 2 DIABETES MELLITUS WITH DIABETIC NEUROPATHY, UNSPECIFIED: ICD-10-CM

## 2020-10-15 DIAGNOSIS — Z87.440 PERSONAL HISTORY OF URINARY (TRACT) INFECTIONS: ICD-10-CM

## 2020-10-15 DIAGNOSIS — L92.9 GRANULOMATOUS DISORDER OF THE SKIN AND SUBCUTANEOUS TISSUE, UNSPECIFIED: ICD-10-CM

## 2020-10-15 DIAGNOSIS — I25.2 OLD MYOCARDIAL INFARCTION: ICD-10-CM

## 2020-10-15 DIAGNOSIS — E87.6 HYPOKALEMIA: ICD-10-CM

## 2020-10-15 PROCEDURE — 11042 DBRDMT SUBQ TIS 1ST 20SQCM/<: CPT

## 2020-10-15 NOTE — REVIEW OF SYSTEMS
[Limb Weakness] : limb weakness [Skin Wound] : skin wound [Easy Bleeding] : a tendency for easy bleeding [FreeTextEntry1] : 9:04am [Chills] : no chills [Eye Pain] : no eye pain [Fever] : no fever [Earache] : no earache [Loss Of Hearing] : no hearing loss [Chest Pain] : no chest pain [Shortness Of Breath] : no shortness of breath [Abdominal Pain] : no abdominal pain [FreeTextEntry9] : s/p left foot TMA w/achilles tenotomy [FreeTextEntry5] : htn, hld diabetic cardio vascular disease  [Anxiety] : no anxiety [de-identified] : IDDM with neuropathy  [de-identified] : diabetic neuropathy [de-identified] : left foot dfu3 , s/p TMA   , healed, left heel DFU 3 new , SSF , hyper granular tissue

## 2020-10-15 NOTE — PROCEDURE
[Fibrotic] : fibrotic [Saline] : saline [Slough] : slough [Necrotic] : necrotic [Scalpel] : scalpel [Sharp scissors] : sharp scissors [Skin] : skin [Subcutaneous tissue] : subcutaneous tissue [Fat] : fat [Clean] : clean [Surgical Thrombipad] : surgical thrombipad [Granulating] : granulating [FreeTextEntry2] : left heel DFU [] : No [FreeTextEntry1] : Thrombipad [de-identified] : Geoffrey [de-identified] : DFU 3 posterior left heel  [FreeTextEntry9] : 9:20am [FreeTextEntry7] : same [FreeTextEntry6] : DFU 3 left heel  [de-identified] : 0 [de-identified] : 5cc [de-identified] : necrotic skin, subcu , fibrin and slough

## 2020-10-15 NOTE — REVIEW OF SYSTEMS
[Skin Wound] : skin wound [Limb Weakness] : limb weakness [Easy Bleeding] : a tendency for easy bleeding [FreeTextEntry1] : 9:04am [Chills] : no chills [Eye Pain] : no eye pain [Fever] : no fever [Loss Of Hearing] : no hearing loss [Earache] : no earache [Chest Pain] : no chest pain [Shortness Of Breath] : no shortness of breath [Abdominal Pain] : no abdominal pain [FreeTextEntry5] : htn, hld diabetic cardio vascular disease  [FreeTextEntry9] : s/p left foot TMA w/achilles tenotomy [Anxiety] : no anxiety [de-identified] : diabetic neuropathy [de-identified] : IDDM with neuropathy  [de-identified] : left foot dfu3 , s/p TMA   , healed, left heel DFU 3 new , SSF , hyper granular tissue

## 2020-10-15 NOTE — REASON FOR VISIT
[Follow-Up: _____] : a [unfilled] follow-up visit [FreeTextEntry5] : left DFU 3  [FreeTextEntry3] : slough , necrosis and hypergranular tissue  [FreeTextEntry4] : Left DFU 3

## 2020-10-15 NOTE — PROCEDURE
[Saline] : saline [Fibrotic] : fibrotic [Slough] : slough [Necrotic] : necrotic [Scalpel] : scalpel [Sharp scissors] : sharp scissors [Skin] : skin [Fat] : fat [Subcutaneous tissue] : subcutaneous tissue [Clean] : clean [Granulating] : granulating [Surgical Thrombipad] : surgical thrombipad [FreeTextEntry2] : left heel DFU [FreeTextEntry1] : Thrombipad [] : No [FreeTextEntry9] : 9:20am [de-identified] : DFU 3 posterior left heel  [de-identified] : Geoffrey [FreeTextEntry7] : same [FreeTextEntry6] : DFU 3 left heel  [de-identified] : 5cc [de-identified] : 0 [de-identified] : necrotic skin, subcu , fibrin and slough

## 2020-10-22 ENCOUNTER — APPOINTMENT (OUTPATIENT)
Dept: PODIATRY | Facility: HOSPITAL | Age: 50
End: 2020-10-22
Payer: MEDICAID

## 2020-10-22 ENCOUNTER — OUTPATIENT (OUTPATIENT)
Dept: OUTPATIENT SERVICES | Facility: HOSPITAL | Age: 50
LOS: 1 days | Discharge: HOME | End: 2020-10-22
Payer: COMMERCIAL

## 2020-10-22 VITALS
OXYGEN SATURATION: 95 % | TEMPERATURE: 97.9 F | HEIGHT: 74 IN | RESPIRATION RATE: 20 BRPM | DIASTOLIC BLOOD PRESSURE: 72 MMHG | SYSTOLIC BLOOD PRESSURE: 128 MMHG | BODY MASS INDEX: 27.59 KG/M2 | WEIGHT: 215 LBS | HEART RATE: 61 BPM

## 2020-10-22 DIAGNOSIS — N40.1 BENIGN PROSTATIC HYPERPLASIA WITH LOWER URINARY TRACT SYMPTOMS: ICD-10-CM

## 2020-10-22 DIAGNOSIS — Z79.899 OTHER LONG TERM (CURRENT) DRUG THERAPY: ICD-10-CM

## 2020-10-22 DIAGNOSIS — Z87.440 PERSONAL HISTORY OF URINARY (TRACT) INFECTIONS: ICD-10-CM

## 2020-10-22 DIAGNOSIS — Z89.432 ACQUIRED ABSENCE OF LEFT FOOT: ICD-10-CM

## 2020-10-22 DIAGNOSIS — L97.422 NON-PRESSURE CHRONIC ULCER OF LEFT HEEL AND MIDFOOT WITH FAT LAYER EXPOSED: ICD-10-CM

## 2020-10-22 DIAGNOSIS — Z98.890 OTHER SPECIFIED POSTPROCEDURAL STATES: Chronic | ICD-10-CM

## 2020-10-22 DIAGNOSIS — E11.40 TYPE 2 DIABETES MELLITUS WITH DIABETIC NEUROPATHY, UNSPECIFIED: ICD-10-CM

## 2020-10-22 DIAGNOSIS — E56.9 VITAMIN DEFICIENCY, UNSPECIFIED: ICD-10-CM

## 2020-10-22 DIAGNOSIS — E87.6 HYPOKALEMIA: ICD-10-CM

## 2020-10-22 DIAGNOSIS — E11.621 TYPE 2 DIABETES MELLITUS WITH FOOT ULCER: ICD-10-CM

## 2020-10-22 DIAGNOSIS — K59.00 CONSTIPATION, UNSPECIFIED: ICD-10-CM

## 2020-10-22 DIAGNOSIS — Z79.01 LONG TERM (CURRENT) USE OF ANTICOAGULANTS: ICD-10-CM

## 2020-10-22 DIAGNOSIS — I69.351 HEMIPLEGIA AND HEMIPARESIS FOLLOWING CEREBRAL INFARCTION AFFECTING RIGHT DOMINANT SIDE: ICD-10-CM

## 2020-10-22 DIAGNOSIS — S98.912A COMPLETE TRAUMATIC AMPUTATION OF LEFT FOOT, LEVEL UNSPECIFIED, INITIAL ENCOUNTER: Chronic | ICD-10-CM

## 2020-10-22 DIAGNOSIS — Z87.891 PERSONAL HISTORY OF NICOTINE DEPENDENCE: ICD-10-CM

## 2020-10-22 DIAGNOSIS — K25.5 CHRONIC OR UNSPECIFIED GASTRIC ULCER WITH PERFORATION: Chronic | ICD-10-CM

## 2020-10-22 DIAGNOSIS — Z79.82 LONG TERM (CURRENT) USE OF ASPIRIN: ICD-10-CM

## 2020-10-22 DIAGNOSIS — Z79.4 LONG TERM (CURRENT) USE OF INSULIN: ICD-10-CM

## 2020-10-22 DIAGNOSIS — D64.9 ANEMIA, UNSPECIFIED: ICD-10-CM

## 2020-10-22 DIAGNOSIS — I25.2 OLD MYOCARDIAL INFARCTION: ICD-10-CM

## 2020-10-22 DIAGNOSIS — Z91.013 ALLERGY TO SEAFOOD: ICD-10-CM

## 2020-10-22 PROCEDURE — G0463: CPT

## 2020-10-22 PROCEDURE — 99213 OFFICE O/P EST LOW 20 MIN: CPT

## 2020-10-22 NOTE — REVIEW OF SYSTEMS
[Fever] : no fever [Chills] : no chills [Eye Pain] : no eye pain [Earache] : no earache [Loss Of Hearing] : no hearing loss [Chest Pain] : no chest pain [Shortness Of Breath] : no shortness of breath [Abdominal Pain] : no abdominal pain [Skin Wound] : skin wound [Limb Weakness] : limb weakness [Anxiety] : no anxiety [Easy Bleeding] : a tendency for easy bleeding [FreeTextEntry5] : htn, hld diabetic cardio vascular disease  [FreeTextEntry9] : s/p left foot TMA w/achilles tenotomy [de-identified] : left foot dfu3 , s/p TMA   , healed, left heel DFU 3 new , SSF , hyper granular tissue  [de-identified] : diabetic neuropathy [de-identified] : IDDM with neuropathy

## 2020-10-22 NOTE — PLAN
[FreeTextEntry1] : continue off loading and wound care , patient would benefit from HBOT when discharged from sub acute

## 2020-10-22 NOTE — ASSESSMENT
[Verbal] : Verbal [Patient] : Patient [Good - alert, interested, motivated] : Good - alert, interested, motivated [Verbalizes knowledge/Understanding] : Verbalizes knowledge/understanding [Dressing changes] : dressing changes [Skin Care] : skin care [Signs and symptoms of infection] : sign and symptoms of infection [How and When to Call] : how and when to call [Off-loading] : off-loading [Patient responsibility to plan of care] : patient responsibility to plan of care [] : Yes [Stable] : stable [LTC] : LTC [Wheelchair] : Wheelchair [Faxed - Long Term Care/Home Health Agency] : Long Term Care/Home Health Agency: Faxed [FreeTextEntry2] : Restore Skin Integrity\par Infection Control\par Localized wound care\par Maintain acceptable pain levels at satisfactory relief.\par Demonstrates use of both nonpharmacological and pharmacological pain relief strategies [FreeTextEntry4] : Photos Taken\par F/U to [facility] in 1 week [FreeTextEntry1] : Instructions sent with patient to LTC

## 2020-10-22 NOTE — PHYSICAL EXAM
[JVD] : no jugular venous distention  [1+] : left 1+ [Ankle Swelling (On Exam)] : present [Ankle Swelling On The Left] : moderate [Varicose Veins Of Lower Extremities] : not present [] : not present [Skin Ulcer] : ulcer [Alert] : alert [Oriented to Person] : oriented to person [Oriented to Place] : oriented to place [Calm] : calm [de-identified] : calm [de-identified] : htn, hld, diabetic cardiovascular disease [de-identified] : s/p left TMA and Achilles tenotomy [de-identified] : left foot TMA , , closed ,  DFU 3 posterior left heel SSF, smaller , granular tissue [de-identified] : IDDM with neuropathy  [de-identified] : DPM Applied Dressing [FreeTextEntry1] : Left Heel [FreeTextEntry2] : 1.1 [FreeTextEntry3] : 1.0 [FreeTextEntry4] : 0.2 [de-identified] : Serosanguineous [de-identified] : Silver Alginate [de-identified] : Cleansed with Normal Saline\par  [TWNoteComboBox4] : Small [TWNoteComboBox5] : No [de-identified] : No [de-identified] : Normal [de-identified] : None [de-identified] : None [de-identified] : 100% [de-identified] : No [de-identified] : Ace wraps [de-identified] : 3x Weekly

## 2020-10-24 NOTE — PHYSICAL EXAM
[4 x 4] : 4 x 4  [Abdominal Pad] : Abdominal Pad [Ankle Swelling (On Exam)] : present [1+] : left 1+ [Ankle Swelling On The Left] : moderate [Skin Ulcer] : ulcer [Alert] : alert [Oriented to Place] : oriented to place [Oriented to Person] : oriented to person [Calm] : calm [JVD] : no jugular venous distention  [Varicose Veins Of Lower Extremities] : not present [] : not present [de-identified] : calm [de-identified] : htn, hld, diabetic cardiovascular disease [de-identified] : left foot TMA , , closed ,  DFU 3 posterior left heel SSF, smaller , necrotic tissue and hypergranular tissue [de-identified] : IDDM with neuropathy  [de-identified] : s/p left TMA and Achilles tenotomy [TWNoteComboBox7] : Adam [de-identified] : Debridement non-excisional [FreeTextEntry1] :  Heel  [FreeTextEntry2] : 1.3 [FreeTextEntry3] : 1.0 [FreeTextEntry4] : 0.3 and hyper [de-identified] : Serous/sanguinous [de-identified] : mildly with moderate erythema [de-identified] : With hypergranulation  [de-identified] : 15% [de-identified] : Expressed comfort post ACE application. [de-identified] : Silver alginate [de-identified] : Cleansed with NS\par Kerlix\par \par AgNo3 used\par *** Bandage applied by LUCIANM  [TWNoteComboBox1] : Left [TWNoteComboBox4] : Moderate [TWNoteComboBox5] : No [TWNoteComboBox6] : Other [de-identified] : No [de-identified] : Macerated [de-identified] : None [de-identified] : None [de-identified] : >75% [de-identified] : Yes [de-identified] : Ace wraps [de-identified] : 3x Weekly [de-identified] : Primary Dressing

## 2020-10-24 NOTE — ASSESSMENT
[Good - alert, interested, motivated] : Good - alert, interested, motivated [Demo] : Demo [Written] : Written [Needs reinforcement] : needs reinforcement [Skin Care] : skin care [Signs and symptoms of infection] : sign and symptoms of infection [Foot Care] : foot care [Nutrition] : nutrition [Pain Management] : pain management [Glycemic Control] : glycemic control [Home Health] : home health [LTC] : LTC [Patient] : Patient [Verbal] : Verbal [Fair - mild discomfort, physical impairment, low acceptance] : Fair - mild discomfort, physical impairment, low acceptance [Verbalizes knowledge/Understanding] : Verbalizes knowledge/understanding [Dressing changes] : dressing changes [How and When to Call] : how and when to call [Off-loading] : off-loading [Compression Therapy] : compression therapy [Patient responsibility to plan of care] : patient responsibility to plan of care [Home] : Home [Stable] : stable [Not Applicable - Long Term Care/Home Health Agency] : Long Term Care/Home Health Agency: Not Applicable [Wheelchair] : Wheelchair [] : No [FreeTextEntry2] : Infection prevention\par Localized wound care \par Goal of remaining pain free regarding wounds.\par Low glycemic diet \par Offloading \par F/U  [FreeTextEntry4] : Apligraf on hold due to hypergranulation \par Follow up in 1 week  [FreeTextEntry1] : Instructions sent with patient

## 2020-10-24 NOTE — PHYSICAL EXAM
[4 x 4] : 4 x 4  [Abdominal Pad] : Abdominal Pad [1+] : left 1+ [Ankle Swelling (On Exam)] : present [Ankle Swelling On The Left] : moderate [Skin Ulcer] : ulcer [Alert] : alert [Oriented to Place] : oriented to place [Oriented to Person] : oriented to person [Calm] : calm [] : not present [JVD] : no jugular venous distention  [Varicose Veins Of Lower Extremities] : not present [de-identified] : calm [de-identified] : htn, hld, diabetic cardiovascular disease [de-identified] : left foot TMA , , closed ,  DFU 3 posterior left heel SSF, smaller , necrotic tissue and hypergranular tissue [de-identified] : IDDM with neuropathy  [de-identified] : s/p left TMA and Achilles tenotomy [TWNoteComboBox7] : Adam [de-identified] : Debridement non-excisional [FreeTextEntry1] :  Heel  [FreeTextEntry2] : 1.3 [FreeTextEntry3] : 1.0 [FreeTextEntry4] : 0.3 and hyper [de-identified] : Serous/sanguinous [de-identified] : mildly with moderate erythema [de-identified] : With hypergranulation  [de-identified] : 15% [de-identified] : Expressed comfort post ACE application. [de-identified] : Silver alginate [de-identified] : Cleansed with NS\par Kerlix\par \par AgNo3 used\par *** Bandage applied by LUCIANM  [TWNoteComboBox1] : Left [TWNoteComboBox4] : Moderate [TWNoteComboBox5] : No [TWNoteComboBox6] : Other [de-identified] : No [de-identified] : Macerated [de-identified] : None [de-identified] : None [de-identified] : >75% [de-identified] : Yes [de-identified] : Ace wraps [de-identified] : 3x Weekly [de-identified] : Primary Dressing

## 2020-10-24 NOTE — ASSESSMENT
[Demo] : Demo [Good - alert, interested, motivated] : Good - alert, interested, motivated [Written] : Written [Needs reinforcement] : needs reinforcement [Skin Care] : skin care [Foot Care] : foot care [Signs and symptoms of infection] : sign and symptoms of infection [Nutrition] : nutrition [Pain Management] : pain management [Glycemic Control] : glycemic control [Home Health] : home health [LTC] : LTC [Patient] : Patient [Verbal] : Verbal [Fair - mild discomfort, physical impairment, low acceptance] : Fair - mild discomfort, physical impairment, low acceptance [Verbalizes knowledge/Understanding] : Verbalizes knowledge/understanding [Dressing changes] : dressing changes [How and When to Call] : how and when to call [Off-loading] : off-loading [Compression Therapy] : compression therapy [Patient responsibility to plan of care] : patient responsibility to plan of care [Stable] : stable [Home] : Home [Wheelchair] : Wheelchair [Not Applicable - Long Term Care/Home Health Agency] : Long Term Care/Home Health Agency: Not Applicable [] : No [FreeTextEntry2] : Infection prevention\par Localized wound care \par Goal of remaining pain free regarding wounds.\par Low glycemic diet \par Offloading \par F/U  [FreeTextEntry4] : Apligraf on hold due to hypergranulation \par Follow up in 1 week  [FreeTextEntry1] : Instructions sent with patient

## 2020-10-24 NOTE — VITALS
[Pain related to present condition?] : The patient's  pain is not related to present condition. [] : No [de-identified] : 0/10 [FreeTextEntry5] : Diuretic added to medication regimen

## 2020-10-24 NOTE — VITALS
[Pain related to present condition?] : The patient's  pain is not related to present condition. [] : No [de-identified] : 0/10 [FreeTextEntry5] : Diuretic added to medication regimen

## 2020-10-29 ENCOUNTER — OUTPATIENT (OUTPATIENT)
Dept: OUTPATIENT SERVICES | Facility: HOSPITAL | Age: 50
LOS: 1 days | Discharge: HOME | End: 2020-10-29
Payer: COMMERCIAL

## 2020-10-29 ENCOUNTER — APPOINTMENT (OUTPATIENT)
Dept: PLASTIC SURGERY | Facility: HOSPITAL | Age: 50
End: 2020-10-29
Payer: MEDICAID

## 2020-10-29 VITALS
OXYGEN SATURATION: 95 % | HEIGHT: 74 IN | RESPIRATION RATE: 20 BRPM | SYSTOLIC BLOOD PRESSURE: 140 MMHG | WEIGHT: 215 LBS | TEMPERATURE: 97.3 F | BODY MASS INDEX: 27.59 KG/M2 | DIASTOLIC BLOOD PRESSURE: 66 MMHG | HEART RATE: 70 BPM

## 2020-10-29 DIAGNOSIS — Z98.890 OTHER SPECIFIED POSTPROCEDURAL STATES: Chronic | ICD-10-CM

## 2020-10-29 DIAGNOSIS — L97.422 NON-PRESSURE CHRONIC ULCER OF LEFT HEEL AND MIDFOOT WITH FAT LAYER EXPOSED: ICD-10-CM

## 2020-10-29 DIAGNOSIS — Z79.4 LONG TERM (CURRENT) USE OF INSULIN: ICD-10-CM

## 2020-10-29 DIAGNOSIS — E56.9 VITAMIN DEFICIENCY, UNSPECIFIED: ICD-10-CM

## 2020-10-29 DIAGNOSIS — Z79.899 OTHER LONG TERM (CURRENT) DRUG THERAPY: ICD-10-CM

## 2020-10-29 DIAGNOSIS — Z79.82 LONG TERM (CURRENT) USE OF ASPIRIN: ICD-10-CM

## 2020-10-29 DIAGNOSIS — Z79.01 LONG TERM (CURRENT) USE OF ANTICOAGULANTS: ICD-10-CM

## 2020-10-29 DIAGNOSIS — Z87.440 PERSONAL HISTORY OF URINARY (TRACT) INFECTIONS: ICD-10-CM

## 2020-10-29 DIAGNOSIS — K59.00 CONSTIPATION, UNSPECIFIED: ICD-10-CM

## 2020-10-29 DIAGNOSIS — E11.621 TYPE 2 DIABETES MELLITUS WITH FOOT ULCER: ICD-10-CM

## 2020-10-29 DIAGNOSIS — D64.9 ANEMIA, UNSPECIFIED: ICD-10-CM

## 2020-10-29 DIAGNOSIS — I69.351 HEMIPLEGIA AND HEMIPARESIS FOLLOWING CEREBRAL INFARCTION AFFECTING RIGHT DOMINANT SIDE: ICD-10-CM

## 2020-10-29 DIAGNOSIS — S98.912A COMPLETE TRAUMATIC AMPUTATION OF LEFT FOOT, LEVEL UNSPECIFIED, INITIAL ENCOUNTER: Chronic | ICD-10-CM

## 2020-10-29 DIAGNOSIS — E87.6 HYPOKALEMIA: ICD-10-CM

## 2020-10-29 DIAGNOSIS — Z89.432 ACQUIRED ABSENCE OF LEFT FOOT: ICD-10-CM

## 2020-10-29 DIAGNOSIS — K25.5 CHRONIC OR UNSPECIFIED GASTRIC ULCER WITH PERFORATION: Chronic | ICD-10-CM

## 2020-10-29 DIAGNOSIS — N40.1 BENIGN PROSTATIC HYPERPLASIA WITH LOWER URINARY TRACT SYMPTOMS: ICD-10-CM

## 2020-10-29 DIAGNOSIS — E11.40 TYPE 2 DIABETES MELLITUS WITH DIABETIC NEUROPATHY, UNSPECIFIED: ICD-10-CM

## 2020-10-29 DIAGNOSIS — Z91.013 ALLERGY TO SEAFOOD: ICD-10-CM

## 2020-10-29 DIAGNOSIS — Z87.891 PERSONAL HISTORY OF NICOTINE DEPENDENCE: ICD-10-CM

## 2020-10-29 DIAGNOSIS — I25.2 OLD MYOCARDIAL INFARCTION: ICD-10-CM

## 2020-10-29 PROCEDURE — G0463: CPT

## 2020-10-29 PROCEDURE — 99213 OFFICE O/P EST LOW 20 MIN: CPT

## 2020-10-29 NOTE — REVIEW OF SYSTEMS
[Fever] : no fever [Chills] : no chills [Eye Pain] : no eye pain [Earache] : no earache [Loss Of Hearing] : no hearing loss [Chest Pain] : no chest pain [Shortness Of Breath] : no shortness of breath [Abdominal Pain] : no abdominal pain [Skin Wound] : skin wound [Limb Weakness] : limb weakness [Anxiety] : no anxiety [Easy Bleeding] : a tendency for easy bleeding [FreeTextEntry5] : htn, hld diabetic cardio vascular disease  [FreeTextEntry9] : s/p left foot TMA w/achilles tenotomy [de-identified] : left foot dfu3 , s/p TMA   , healed, left heel DFU 3 new , SSF , hyper granular tissue  [de-identified] : diabetic neuropathy [de-identified] : IDDM with neuropathy

## 2020-10-29 NOTE — ASSESSMENT
[Verbal] : Verbal [Patient] : Patient [Good - alert, interested, motivated] : Good - alert, interested, motivated [Verbalizes knowledge/Understanding] : Verbalizes knowledge/understanding [Dressing changes] : dressing changes [Skin Care] : skin care [Signs and symptoms of infection] : sign and symptoms of infection [How and When to Call] : how and when to call [Off-loading] : off-loading [Patient responsibility to plan of care] : patient responsibility to plan of care [Stable] : stable [LTC] : LTC [Wheelchair] : Wheelchair [Faxed - Long Term Care/Home Health Agency] : Long Term Care/Home Health Agency: Faxed [] : No [FreeTextEntry2] : Restore Skin Integrity\par Infection Control\par Localized wound care\par Maintain acceptable pain levels at satisfactory relief.\par Demonstrates use of both nonpharmacological and pharmacological pain relief strategies [FreeTextEntry4] : F/U to [facility] in 1 week [FreeTextEntry1] : Instructions sent with patient to LTC

## 2020-10-29 NOTE — HISTORY OF PRESENT ILLNESS
[FreeTextEntry1] : DFU 3 posterior left heel , granular clean and smaller \par 10/29/20 left heel wound much smaller

## 2020-10-29 NOTE — PHYSICAL EXAM
[4 x 4] : 4 x 4  [JVD] : no jugular venous distention  [1+] : left 1+ [Ankle Swelling (On Exam)] : present [Ankle Swelling On The Left] : moderate [Varicose Veins Of Lower Extremities] : not present [] : not present [Skin Ulcer] : ulcer [Alert] : alert [Oriented to Person] : oriented to person [Oriented to Place] : oriented to place [Calm] : calm [de-identified] : calm [de-identified] : htn, hld, diabetic cardiovascular disease [de-identified] : s/p left TMA and Achilles tenotomy [de-identified] : left foot TMA , , closed ,  DFU 3 posterior left heel SSF, smaller , granular tissue [de-identified] : IDDM with neuropathy  [de-identified] : DPM Applied Dressing [FreeTextEntry1] : Left Heel [FreeTextEntry2] : 0.5 [FreeTextEntry3] : 0.2 [FreeTextEntry4] : 0.2 [de-identified] : Scant Serosanguineous [de-identified] : Silver Alginate [de-identified] : Cleansed with Normal Saline\par  [TWNoteComboBox4] : Small [TWNoteComboBox5] : No [de-identified] : No [de-identified] : Normal [de-identified] : None [de-identified] : None [de-identified] : 100% [de-identified] : No [de-identified] : Ace wraps [de-identified] : 3x Weekly

## 2020-11-06 ENCOUNTER — OUTPATIENT (OUTPATIENT)
Dept: OUTPATIENT SERVICES | Facility: HOSPITAL | Age: 50
LOS: 1 days | Discharge: HOME | End: 2020-11-06
Payer: COMMERCIAL

## 2020-11-06 ENCOUNTER — APPOINTMENT (OUTPATIENT)
Dept: PODIATRY | Facility: HOSPITAL | Age: 50
End: 2020-11-06
Payer: MEDICAID

## 2020-11-06 VITALS
DIASTOLIC BLOOD PRESSURE: 76 MMHG | RESPIRATION RATE: 20 BRPM | HEIGHT: 74 IN | OXYGEN SATURATION: 98 % | SYSTOLIC BLOOD PRESSURE: 138 MMHG | HEART RATE: 58 BPM | WEIGHT: 215 LBS | TEMPERATURE: 97.9 F | BODY MASS INDEX: 27.59 KG/M2

## 2020-11-06 DIAGNOSIS — K59.00 CONSTIPATION, UNSPECIFIED: ICD-10-CM

## 2020-11-06 DIAGNOSIS — Z79.899 OTHER LONG TERM (CURRENT) DRUG THERAPY: ICD-10-CM

## 2020-11-06 DIAGNOSIS — E11.621 TYPE 2 DIABETES MELLITUS WITH FOOT ULCER: ICD-10-CM

## 2020-11-06 DIAGNOSIS — Z79.4 LONG TERM (CURRENT) USE OF INSULIN: ICD-10-CM

## 2020-11-06 DIAGNOSIS — E11.40 TYPE 2 DIABETES MELLITUS WITH DIABETIC NEUROPATHY, UNSPECIFIED: ICD-10-CM

## 2020-11-06 DIAGNOSIS — Z87.891 PERSONAL HISTORY OF NICOTINE DEPENDENCE: ICD-10-CM

## 2020-11-06 DIAGNOSIS — E56.9 VITAMIN DEFICIENCY, UNSPECIFIED: ICD-10-CM

## 2020-11-06 DIAGNOSIS — I69.351 HEMIPLEGIA AND HEMIPARESIS FOLLOWING CEREBRAL INFARCTION AFFECTING RIGHT DOMINANT SIDE: ICD-10-CM

## 2020-11-06 DIAGNOSIS — E87.6 HYPOKALEMIA: ICD-10-CM

## 2020-11-06 DIAGNOSIS — Z79.82 LONG TERM (CURRENT) USE OF ASPIRIN: ICD-10-CM

## 2020-11-06 DIAGNOSIS — Z87.440 PERSONAL HISTORY OF URINARY (TRACT) INFECTIONS: ICD-10-CM

## 2020-11-06 DIAGNOSIS — Z91.013 ALLERGY TO SEAFOOD: ICD-10-CM

## 2020-11-06 DIAGNOSIS — K25.5 CHRONIC OR UNSPECIFIED GASTRIC ULCER WITH PERFORATION: Chronic | ICD-10-CM

## 2020-11-06 DIAGNOSIS — I25.2 OLD MYOCARDIAL INFARCTION: ICD-10-CM

## 2020-11-06 DIAGNOSIS — Z79.01 LONG TERM (CURRENT) USE OF ANTICOAGULANTS: ICD-10-CM

## 2020-11-06 DIAGNOSIS — S98.912A COMPLETE TRAUMATIC AMPUTATION OF LEFT FOOT, LEVEL UNSPECIFIED, INITIAL ENCOUNTER: Chronic | ICD-10-CM

## 2020-11-06 DIAGNOSIS — Z89.432 ACQUIRED ABSENCE OF LEFT FOOT: ICD-10-CM

## 2020-11-06 DIAGNOSIS — D64.9 ANEMIA, UNSPECIFIED: ICD-10-CM

## 2020-11-06 DIAGNOSIS — L97.422 NON-PRESSURE CHRONIC ULCER OF LEFT HEEL AND MIDFOOT WITH FAT LAYER EXPOSED: ICD-10-CM

## 2020-11-06 DIAGNOSIS — N40.1 BENIGN PROSTATIC HYPERPLASIA WITH LOWER URINARY TRACT SYMPTOMS: ICD-10-CM

## 2020-11-06 DIAGNOSIS — Z98.890 OTHER SPECIFIED POSTPROCEDURAL STATES: Chronic | ICD-10-CM

## 2020-11-06 PROCEDURE — 99213 OFFICE O/P EST LOW 20 MIN: CPT

## 2020-11-06 PROCEDURE — G0463: CPT

## 2020-11-06 NOTE — REVIEW OF SYSTEMS
[Fever] : no fever [Chills] : no chills [Eye Pain] : no eye pain [Earache] : no earache [Loss Of Hearing] : no hearing loss [Chest Pain] : no chest pain [Shortness Of Breath] : no shortness of breath [Abdominal Pain] : no abdominal pain [Skin Wound] : skin wound [Limb Weakness] : limb weakness [Anxiety] : no anxiety [Easy Bleeding] : a tendency for easy bleeding [FreeTextEntry5] : htn, hld diabetic cardio vascular disease  [FreeTextEntry9] : s/p left foot TMA w/achilles tenotomy [de-identified] : left foot dfu3 , s/p TMA   , healed, left heel DFU 3 new , SSF , hyper granular tissue  [de-identified] : diabetic neuropathy [de-identified] : IDDM with neuropathy

## 2020-11-06 NOTE — ASSESSMENT
[Verbal] : Verbal [Patient] : Patient [Good - alert, interested, motivated] : Good - alert, interested, motivated [Verbalizes knowledge/Understanding] : Verbalizes knowledge/understanding [Dressing changes] : dressing changes [Foot Care] : foot care [Skin Care] : skin care [Signs and symptoms of infection] : sign and symptoms of infection [How and When to Call] : how and when to call [Compression Therapy] : compression therapy [Patient responsibility to plan of care] : patient responsibility to plan of care [] : Yes [Stable] : stable [Home] : Home [Wheelchair] : Wheelchair [Faxed - Long Term Care/Home Health Agency] : Long Term Care/Home Health Agency: Faxed [FreeTextEntry2] : Restore Skin Integrity\par Infection Control\par Localized wound care\par Maintain acceptable pain levels at satisfactory relief.\par Demonstrates use of both nonpharmacological and pharmacological pain relief strategies [FreeTextEntry4] : Photos Taken\par F/U to [facility] in 1 week [FreeTextEntry1] : [place]- Instructions given to pt

## 2020-11-12 ENCOUNTER — APPOINTMENT (OUTPATIENT)
Dept: PODIATRY | Facility: HOSPITAL | Age: 50
End: 2020-11-12
Payer: MEDICAID

## 2020-11-12 ENCOUNTER — OUTPATIENT (OUTPATIENT)
Dept: OUTPATIENT SERVICES | Facility: HOSPITAL | Age: 50
LOS: 1 days | Discharge: HOME | End: 2020-11-12
Payer: COMMERCIAL

## 2020-11-12 VITALS
OXYGEN SATURATION: 96 % | HEIGHT: 74 IN | BODY MASS INDEX: 27.59 KG/M2 | DIASTOLIC BLOOD PRESSURE: 74 MMHG | RESPIRATION RATE: 20 BRPM | WEIGHT: 215 LBS | TEMPERATURE: 97.6 F | SYSTOLIC BLOOD PRESSURE: 128 MMHG | HEART RATE: 67 BPM

## 2020-11-12 DIAGNOSIS — Z79.01 LONG TERM (CURRENT) USE OF ANTICOAGULANTS: ICD-10-CM

## 2020-11-12 DIAGNOSIS — Z87.891 PERSONAL HISTORY OF NICOTINE DEPENDENCE: ICD-10-CM

## 2020-11-12 DIAGNOSIS — Z98.890 OTHER SPECIFIED POSTPROCEDURAL STATES: Chronic | ICD-10-CM

## 2020-11-12 DIAGNOSIS — K59.00 CONSTIPATION, UNSPECIFIED: ICD-10-CM

## 2020-11-12 DIAGNOSIS — Z91.013 ALLERGY TO SEAFOOD: ICD-10-CM

## 2020-11-12 DIAGNOSIS — Z79.899 OTHER LONG TERM (CURRENT) DRUG THERAPY: ICD-10-CM

## 2020-11-12 DIAGNOSIS — I25.2 OLD MYOCARDIAL INFARCTION: ICD-10-CM

## 2020-11-12 DIAGNOSIS — Z79.82 LONG TERM (CURRENT) USE OF ASPIRIN: ICD-10-CM

## 2020-11-12 DIAGNOSIS — S98.912A COMPLETE TRAUMATIC AMPUTATION OF LEFT FOOT, LEVEL UNSPECIFIED, INITIAL ENCOUNTER: Chronic | ICD-10-CM

## 2020-11-12 DIAGNOSIS — I69.351 HEMIPLEGIA AND HEMIPARESIS FOLLOWING CEREBRAL INFARCTION AFFECTING RIGHT DOMINANT SIDE: ICD-10-CM

## 2020-11-12 DIAGNOSIS — E11.621 TYPE 2 DIABETES MELLITUS WITH FOOT ULCER: ICD-10-CM

## 2020-11-12 DIAGNOSIS — E87.6 HYPOKALEMIA: ICD-10-CM

## 2020-11-12 DIAGNOSIS — Z79.4 LONG TERM (CURRENT) USE OF INSULIN: ICD-10-CM

## 2020-11-12 DIAGNOSIS — K25.5 CHRONIC OR UNSPECIFIED GASTRIC ULCER WITH PERFORATION: Chronic | ICD-10-CM

## 2020-11-12 DIAGNOSIS — E11.40 TYPE 2 DIABETES MELLITUS WITH DIABETIC NEUROPATHY, UNSPECIFIED: ICD-10-CM

## 2020-11-12 DIAGNOSIS — L97.422 NON-PRESSURE CHRONIC ULCER OF LEFT HEEL AND MIDFOOT WITH FAT LAYER EXPOSED: ICD-10-CM

## 2020-11-12 DIAGNOSIS — Z87.440 PERSONAL HISTORY OF URINARY (TRACT) INFECTIONS: ICD-10-CM

## 2020-11-12 DIAGNOSIS — D64.9 ANEMIA, UNSPECIFIED: ICD-10-CM

## 2020-11-12 DIAGNOSIS — Z89.432 ACQUIRED ABSENCE OF LEFT FOOT: ICD-10-CM

## 2020-11-12 DIAGNOSIS — N40.1 BENIGN PROSTATIC HYPERPLASIA WITH LOWER URINARY TRACT SYMPTOMS: ICD-10-CM

## 2020-11-12 PROCEDURE — 99213 OFFICE O/P EST LOW 20 MIN: CPT

## 2020-11-12 PROCEDURE — G0463: CPT

## 2020-11-12 NOTE — REVIEW OF SYSTEMS
[Fever] : no fever [Chills] : no chills [Eye Pain] : no eye pain [Earache] : no earache [Loss Of Hearing] : no hearing loss [Chest Pain] : no chest pain [Shortness Of Breath] : no shortness of breath [Abdominal Pain] : no abdominal pain [Skin Wound] : skin wound [Limb Weakness] : limb weakness [Anxiety] : no anxiety [Easy Bleeding] : a tendency for easy bleeding [FreeTextEntry5] : htn, hld diabetic cardio vascular disease  [FreeTextEntry9] : s/p left foot TMA w/achilles tenotomy [de-identified] : left foot dfu3 , s/p TMA   , healed, left heel DFU 3 new , SSF , hyper granular tissue  [de-identified] : diabetic neuropathy [de-identified] : IDDM with neuropathy

## 2020-11-12 NOTE — ASSESSMENT
[Verbal] : Verbal [Patient] : Patient [Good - alert, interested, motivated] : Good - alert, interested, motivated [Verbalizes knowledge/Understanding] : Verbalizes knowledge/understanding [Dressing changes] : dressing changes [Foot Care] : foot care [Skin Care] : skin care [Signs and symptoms of infection] : sign and symptoms of infection [How and When to Call] : how and when to call [Compression Therapy] : compression therapy [Patient responsibility to plan of care] : patient responsibility to plan of care [Stable] : stable [Home] : Home [Wheelchair] : Wheelchair [Faxed - Long Term Care/Home Health Agency] : Long Term Care/Home Health Agency: Faxed [] : No [FreeTextEntry2] : Restore Skin Integrity\par Infection Control\par Localized wound care\par Maintain acceptable pain levels at satisfactory relief.\par Demonstrates use of both nonpharmacological and pharmacological pain relief strategies [FreeTextEntry4] : F/U to [facility] in 1 week [FreeTextEntry1] : [place]- Instructions given to pt

## 2020-11-12 NOTE — PHYSICAL EXAM
[2 x 2] : 2 x 2  [JVD] : no jugular venous distention  [1+] : left 1+ [Ankle Swelling (On Exam)] : present [Ankle Swelling On The Left] : moderate [Varicose Veins Of Lower Extremities] : not present [] : not present [Skin Ulcer] : ulcer [Alert] : alert [Oriented to Person] : oriented to person [Oriented to Place] : oriented to place [Calm] : calm [de-identified] : calm [de-identified] : htn, hld, diabetic cardiovascular disease [de-identified] : s/p left TMA and Achilles tenotomy [de-identified] : left foot TMA , , closed ,  DFU 3 posterior left heel SSF, smaller , granular tissue [de-identified] : IDDM with neuropathy  [de-identified] : Dressing applied by JESÚS [FreeTextEntry1] : Left Heel - Dry Eschar [de-identified] : Intact [de-identified] : Silver Alginate, Allevyn Boarder [de-identified] : Cleansed with Normal Saline\par  [de-identified] : Dressing applied by JESÚS [FreeTextEntry7] : Right Heel - closed [de-identified] : Allevyn Boarder [TWNoteComboBox4] : None [TWNoteComboBox5] : No [de-identified] : No [de-identified] : None [de-identified] : None [de-identified] : 100% [de-identified] : No [de-identified] : Ace wraps [de-identified] : 3x Weekly

## 2020-11-19 ENCOUNTER — APPOINTMENT (OUTPATIENT)
Dept: PODIATRY | Facility: HOSPITAL | Age: 50
End: 2020-11-19
Payer: MEDICAID

## 2020-11-19 ENCOUNTER — OUTPATIENT (OUTPATIENT)
Dept: OUTPATIENT SERVICES | Facility: HOSPITAL | Age: 50
LOS: 1 days | Discharge: HOME | End: 2020-11-19
Payer: COMMERCIAL

## 2020-11-19 VITALS
BODY MASS INDEX: 27.59 KG/M2 | TEMPERATURE: 98.1 F | HEART RATE: 66 BPM | RESPIRATION RATE: 20 BRPM | HEIGHT: 74 IN | SYSTOLIC BLOOD PRESSURE: 124 MMHG | OXYGEN SATURATION: 98 % | DIASTOLIC BLOOD PRESSURE: 76 MMHG | WEIGHT: 215 LBS

## 2020-11-19 DIAGNOSIS — E11.621 TYPE 2 DIABETES MELLITUS WITH FOOT ULCER: ICD-10-CM

## 2020-11-19 DIAGNOSIS — Z79.4 LONG TERM (CURRENT) USE OF INSULIN: ICD-10-CM

## 2020-11-19 DIAGNOSIS — L97.422 NON-PRESSURE CHRONIC ULCER OF LEFT HEEL AND MIDFOOT WITH FAT LAYER EXPOSED: ICD-10-CM

## 2020-11-19 DIAGNOSIS — K59.00 CONSTIPATION, UNSPECIFIED: ICD-10-CM

## 2020-11-19 DIAGNOSIS — Z79.82 LONG TERM (CURRENT) USE OF ASPIRIN: ICD-10-CM

## 2020-11-19 DIAGNOSIS — Z79.01 LONG TERM (CURRENT) USE OF ANTICOAGULANTS: ICD-10-CM

## 2020-11-19 DIAGNOSIS — N40.1 BENIGN PROSTATIC HYPERPLASIA WITH LOWER URINARY TRACT SYMPTOMS: ICD-10-CM

## 2020-11-19 DIAGNOSIS — E11.40 TYPE 2 DIABETES MELLITUS WITH DIABETIC NEUROPATHY, UNSPECIFIED: ICD-10-CM

## 2020-11-19 DIAGNOSIS — D64.9 ANEMIA, UNSPECIFIED: ICD-10-CM

## 2020-11-19 DIAGNOSIS — I69.351 HEMIPLEGIA AND HEMIPARESIS FOLLOWING CEREBRAL INFARCTION AFFECTING RIGHT DOMINANT SIDE: ICD-10-CM

## 2020-11-19 DIAGNOSIS — I25.2 OLD MYOCARDIAL INFARCTION: ICD-10-CM

## 2020-11-19 DIAGNOSIS — S98.912A COMPLETE TRAUMATIC AMPUTATION OF LEFT FOOT, LEVEL UNSPECIFIED, INITIAL ENCOUNTER: Chronic | ICD-10-CM

## 2020-11-19 DIAGNOSIS — Z91.013 ALLERGY TO SEAFOOD: ICD-10-CM

## 2020-11-19 DIAGNOSIS — Z87.440 PERSONAL HISTORY OF URINARY (TRACT) INFECTIONS: ICD-10-CM

## 2020-11-19 DIAGNOSIS — K25.5 CHRONIC OR UNSPECIFIED GASTRIC ULCER WITH PERFORATION: Chronic | ICD-10-CM

## 2020-11-19 DIAGNOSIS — Z89.432 ACQUIRED ABSENCE OF LEFT FOOT: ICD-10-CM

## 2020-11-19 DIAGNOSIS — Z98.890 OTHER SPECIFIED POSTPROCEDURAL STATES: Chronic | ICD-10-CM

## 2020-11-19 DIAGNOSIS — Z87.891 PERSONAL HISTORY OF NICOTINE DEPENDENCE: ICD-10-CM

## 2020-11-19 DIAGNOSIS — E87.6 HYPOKALEMIA: ICD-10-CM

## 2020-11-19 DIAGNOSIS — E56.9 VITAMIN DEFICIENCY, UNSPECIFIED: ICD-10-CM

## 2020-11-19 DIAGNOSIS — Z79.899 OTHER LONG TERM (CURRENT) DRUG THERAPY: ICD-10-CM

## 2020-11-19 PROCEDURE — 99213 OFFICE O/P EST LOW 20 MIN: CPT

## 2020-11-19 PROCEDURE — G0463: CPT

## 2020-11-19 NOTE — PHYSICAL EXAM
[JVD] : no jugular venous distention  [1+] : left 1+ [Ankle Swelling (On Exam)] : present [Ankle Swelling On The Left] : moderate [Varicose Veins Of Lower Extremities] : not present [] : not present [Skin Ulcer] : ulcer [Alert] : alert [Oriented to Person] : oriented to person [Oriented to Place] : oriented to place [Calm] : calm [de-identified] : calm [de-identified] : htn, hld, diabetic cardiovascular disease [de-identified] : s/p left TMA and Achilles tenotomy [de-identified] : left foot TMA , , closed ,  DFU 3 posterior left heel closed  [de-identified] : IDDM with neuropathy  [de-identified] : Dressing applied by JESÚS [FreeTextEntry1] :  Heel -closed [de-identified] : Intact [de-identified] : White sock [de-identified] : Cleansed with Normal Saline\par  [de-identified] : Dressing applied by JESÚS [FreeTextEntry7] : Heel - closed [de-identified] : White sock [TWNoteComboBox1] : Left [TWNoteComboBox4] : None [TWNoteComboBox5] : No [de-identified] : No [de-identified] : None [de-identified] : None [de-identified] : 100% [de-identified] : No [de-identified] : Ace wraps [de-identified] : Daily [de-identified] : Primary Dressing [TWNoteComboBox9] : Right [de-identified] : Daily [de-identified] : Primary Dressing

## 2020-11-19 NOTE — REVIEW OF SYSTEMS
[Fever] : no fever [Chills] : no chills [Eye Pain] : no eye pain [Earache] : no earache [Loss Of Hearing] : no hearing loss [Chest Pain] : no chest pain [Shortness Of Breath] : no shortness of breath [Abdominal Pain] : no abdominal pain [Skin Wound] : skin wound [Limb Weakness] : limb weakness [Anxiety] : no anxiety [Easy Bleeding] : a tendency for easy bleeding [FreeTextEntry5] : htn, hld diabetic cardio vascular disease  [FreeTextEntry9] : s/p left foot TMA w/Achilles tenotomy [de-identified] : left foot dfu3 , s/p TMA   , healed, left heel DFU 3 , healed  [de-identified] : diabetic neuropathy [de-identified] : IDDM with neuropathy

## 2020-11-19 NOTE — ASSESSMENT
[Verbal] : Verbal [Patient] : Patient [Good - alert, interested, motivated] : Good - alert, interested, motivated [Verbalizes knowledge/Understanding] : Verbalizes knowledge/understanding [Dressing changes] : dressing changes [Foot Care] : foot care [Skin Care] : skin care [Signs and symptoms of infection] : sign and symptoms of infection [How and When to Call] : how and when to call [Patient responsibility to plan of care] : patient responsibility to plan of care [Stable] : stable [Home] : Home [Wheelchair] : Wheelchair [Faxed - Long Term Care/Home Health Agency] : Long Term Care/Home Health Agency: Faxed [] : No [FreeTextEntry2] : Restore Skin Integrity\par Infection Control\par Localized wound care\par Maintain acceptable pain levels at satisfactory relief.\par Demonstrates use of both nonpharmacological and pharmacological pain relief strategies\par F/U 2 weeks [FreeTextEntry4] : DPM recommended patient begin transitioning into orthotics, patient verbalized understanding.\par F/U 2 weeks [FreeTextEntry1] : continue off loading with diabetic shoe gear , able to transition into shoe gear and slowly resume activity level with observation of feet on a daily basis .

## 2020-11-19 NOTE — VITALS
[Pain related to present condition?] : The patient's  pain is not related to present condition. [] : No [de-identified] : 0/10

## 2020-11-25 NOTE — PROGRESS NOTE ADULT - PROBLEM SELECTOR PLAN 1
Head, normocephalic, atraumatic, Face, Face within normal limits, Ears, External ears within normal limits, Nose/Nasopharynx, External nose normal appearance, nares patent, Mouth and Throat, Oral cavity appearance normal, Lips, Appearance normal - Patient admitted for CVA. Right sided facial drooping (improved while in ER), Right sided weakness in upper and lower extremity.  - Patient had previous CVA on 8/2019 s/p TPA in the ED (around 22:00)  - Patient did not receive TPA this admission, currently on Eliquis and clopidogrel  - CT head shows no intraluminal thrombus or dissection. Incidental right MCA bifurcation aneurysm. Mild chronic microvascular changes without evidence of an   acute transcortical infarction or hemorrhage.   - MRI neg for acute cva  - cont neuro checks q4hr for now  - order lipid panel, f/u results  - HbA1C was 8.3 on 8/1/2019  - c/w atorvastatin 80mg   -speech and swallow eval (passed bedside swallow in the ED- will start clear liquids tonight)  - cont PT eval/ OT eval  - Dr. Jon, neuro, consulted in ED, f/u recs

## 2020-12-03 ENCOUNTER — OUTPATIENT (OUTPATIENT)
Dept: OUTPATIENT SERVICES | Facility: HOSPITAL | Age: 50
LOS: 1 days | Discharge: HOME | End: 2020-12-03
Payer: COMMERCIAL

## 2020-12-03 ENCOUNTER — APPOINTMENT (OUTPATIENT)
Dept: PODIATRY | Facility: HOSPITAL | Age: 50
End: 2020-12-03
Payer: MEDICAID

## 2020-12-03 VITALS
SYSTOLIC BLOOD PRESSURE: 125 MMHG | DIASTOLIC BLOOD PRESSURE: 70 MMHG | OXYGEN SATURATION: 96 % | WEIGHT: 215 LBS | RESPIRATION RATE: 20 BRPM | TEMPERATURE: 97.4 F | BODY MASS INDEX: 27.59 KG/M2 | HEART RATE: 68 BPM | HEIGHT: 74 IN

## 2020-12-03 DIAGNOSIS — E11.621 TYPE 2 DIABETES MELLITUS WITH FOOT ULCER: ICD-10-CM

## 2020-12-03 DIAGNOSIS — K25.5 CHRONIC OR UNSPECIFIED GASTRIC ULCER WITH PERFORATION: Chronic | ICD-10-CM

## 2020-12-03 DIAGNOSIS — L97.509 TYPE 2 DIABETES MELLITUS WITH FOOT ULCER: ICD-10-CM

## 2020-12-03 DIAGNOSIS — S98.912A COMPLETE TRAUMATIC AMPUTATION OF LEFT FOOT, LEVEL UNSPECIFIED, INITIAL ENCOUNTER: Chronic | ICD-10-CM

## 2020-12-03 DIAGNOSIS — Z98.890 OTHER SPECIFIED POSTPROCEDURAL STATES: Chronic | ICD-10-CM

## 2020-12-03 DIAGNOSIS — L97.422 NON-PRESSURE CHRONIC ULCER OF LEFT HEEL AND MIDFOOT WITH FAT LAYER EXPOSED: ICD-10-CM

## 2020-12-03 DIAGNOSIS — Z89.432 ACQUIRED ABSENCE OF LEFT FOOT: ICD-10-CM

## 2020-12-03 PROCEDURE — 99213 OFFICE O/P EST LOW 20 MIN: CPT

## 2020-12-03 PROCEDURE — G0463: CPT

## 2020-12-03 NOTE — REVIEW OF SYSTEMS
[Fever] : no fever [Chills] : no chills [Eye Pain] : no eye pain [Earache] : no earache [Loss Of Hearing] : no hearing loss [Chest Pain] : no chest pain [Shortness Of Breath] : no shortness of breath [Abdominal Pain] : no abdominal pain [Skin Wound] : skin wound [Limb Weakness] : limb weakness [Anxiety] : no anxiety [Easy Bleeding] : a tendency for easy bleeding [FreeTextEntry5] : htn, hld diabetic cardio vascular disease  [FreeTextEntry9] : s/p left foot TMA w/Achilles tenotomy [de-identified] : left foot dfu3 , s/p TMA   , healed, left heel DFU 3 ,skin , subcutaneous , right heel pre trophic  [de-identified] : diabetic neuropathy [de-identified] : IDDM with neuropathy

## 2020-12-03 NOTE — ASSESSMENT
[Verbal] : Verbal [Written] : Written [Demo] : Demo [Patient] : Patient [Home Health Provider] : Home Health Provider [Fair - mild discomfort, physical impairment, low acceptance] : Fair - mild discomfort, physical impairment, low acceptance [Needs reinforcement] : needs reinforcement [Foot Care] : foot care [Skin Care] : skin care [Pressure relief] : pressure relief [Signs and symptoms of infection] : sign and symptoms of infection [Nutrition] : nutrition [How and When to Call] : how and when to call [Pain Management] : pain management [Off-loading] : off-loading [Home Health] : home health [Patient responsibility to plan of care] : patient responsibility to plan of care [Stable] : stable [LTC] : LTC [Wheelchair] : Wheelchair [Not Applicable - Long Term Care/Home Health Agency] : Long Term Care/Home Health Agency: Not Applicable [] : No [FreeTextEntry2] : Infection prevention\par Localized wound care \par Goal of remaining pain free regarding wounds.\par Promote optimal nutrition \par Offloading  [FreeTextEntry3] : Left heel opened again  [FreeTextEntry4] : Instructions given to patient for LTC facility  to obtain heel booties.\par Follow up in 2 weeks

## 2020-12-03 NOTE — PHYSICAL EXAM
[4 x 4] : 4 x 4  [Abdominal Pad] : Abdominal Pad [JVD] : no jugular venous distention  [1+] : left 1+ [Ankle Swelling (On Exam)] : present [Ankle Swelling On The Left] : moderate [Varicose Veins Of Lower Extremities] : not present [] : not present [Skin Ulcer] : ulcer [Alert] : alert [Oriented to Person] : oriented to person [Oriented to Place] : oriented to place [Calm] : calm [de-identified] : calm [de-identified] : htn, hld, diabetic cardiovascular disease [de-identified] : s/p left TMA and Achilles tenotomy [de-identified] : left foot TMA , , closed ,  DFU 3 posterior left heel skin , subcutaneous , pretrophic area to be watched and off loaded right posterior heel  [de-identified] : IDDM with neuropathy  [FreeTextEntry1] : Left heel  [FreeTextEntry2] : 0.4 [FreeTextEntry3] : 0.4 [FreeTextEntry4] : 0.1 [de-identified] : Serous/sanguinous [de-identified] : Expressed comfort post ACE application. [de-identified] : Silver alginate [de-identified] : Cleansed with NS\par Kerlix \par \par ** Bandage applied by DPM [FreeTextEntry7] : Right heel - No open wound  [de-identified] : Expressed comfort post ACE application. [de-identified] : No other treatment  [TWNoteComboBox4] : Moderate [de-identified] : Normal [de-identified] : None [de-identified] : None [de-identified] : 100% [de-identified] : No [de-identified] : Ace wraps [de-identified] : 3x Weekly [de-identified] : Secondary Dressing [de-identified] : Ace wraps [de-identified] : 3x Weekly [de-identified] : Secondary Dressing

## 2020-12-03 NOTE — HISTORY OF PRESENT ILLNESS
[FreeTextEntry1] : DFU posterior left heel and pre trophic pressure area posterior medial right heal not open

## 2020-12-04 DIAGNOSIS — E11.621 TYPE 2 DIABETES MELLITUS WITH FOOT ULCER: ICD-10-CM

## 2020-12-04 DIAGNOSIS — Z91.013 ALLERGY TO SEAFOOD: ICD-10-CM

## 2020-12-04 DIAGNOSIS — I69.351 HEMIPLEGIA AND HEMIPARESIS FOLLOWING CEREBRAL INFARCTION AFFECTING RIGHT DOMINANT SIDE: ICD-10-CM

## 2020-12-04 DIAGNOSIS — Z87.440 PERSONAL HISTORY OF URINARY (TRACT) INFECTIONS: ICD-10-CM

## 2020-12-04 DIAGNOSIS — N40.1 BENIGN PROSTATIC HYPERPLASIA WITH LOWER URINARY TRACT SYMPTOMS: ICD-10-CM

## 2020-12-04 DIAGNOSIS — L97.422 NON-PRESSURE CHRONIC ULCER OF LEFT HEEL AND MIDFOOT WITH FAT LAYER EXPOSED: ICD-10-CM

## 2020-12-04 DIAGNOSIS — Z89.432 ACQUIRED ABSENCE OF LEFT FOOT: ICD-10-CM

## 2020-12-04 DIAGNOSIS — E87.6 HYPOKALEMIA: ICD-10-CM

## 2020-12-04 DIAGNOSIS — Z79.899 OTHER LONG TERM (CURRENT) DRUG THERAPY: ICD-10-CM

## 2020-12-04 DIAGNOSIS — Z79.4 LONG TERM (CURRENT) USE OF INSULIN: ICD-10-CM

## 2020-12-04 DIAGNOSIS — Z87.891 PERSONAL HISTORY OF NICOTINE DEPENDENCE: ICD-10-CM

## 2020-12-04 DIAGNOSIS — K59.00 CONSTIPATION, UNSPECIFIED: ICD-10-CM

## 2020-12-04 DIAGNOSIS — Z79.01 LONG TERM (CURRENT) USE OF ANTICOAGULANTS: ICD-10-CM

## 2020-12-04 DIAGNOSIS — E56.9 VITAMIN DEFICIENCY, UNSPECIFIED: ICD-10-CM

## 2020-12-04 DIAGNOSIS — I25.2 OLD MYOCARDIAL INFARCTION: ICD-10-CM

## 2020-12-04 DIAGNOSIS — Z79.82 LONG TERM (CURRENT) USE OF ASPIRIN: ICD-10-CM

## 2020-12-04 DIAGNOSIS — E11.40 TYPE 2 DIABETES MELLITUS WITH DIABETIC NEUROPATHY, UNSPECIFIED: ICD-10-CM

## 2020-12-23 ENCOUNTER — NON-APPOINTMENT (OUTPATIENT)
Age: 50
End: 2020-12-23

## 2020-12-24 ENCOUNTER — APPOINTMENT (OUTPATIENT)
Dept: PODIATRY | Facility: HOSPITAL | Age: 50
End: 2020-12-24

## 2021-02-08 NOTE — PROGRESS NOTE ADULT - PROBLEM SELECTOR PLAN 2
BLE weakness BLE weakness -patient was recently discharged from hospital for perforated ulcer  stable  -continue 40mg pantoprazole BID  - continue sulcrafate daily  -monitor hgb   - risk/benefit of  heparin drip with recent perforation of ulcer, treat PE monitor for signs of bleeding  - check FOBT

## 2021-02-23 NOTE — DIETITIAN INITIAL EVALUATION ADULT. - NUTRITION DIAGNOSTIC #1 OTHER
Refill request received from pharmacy.  Medication pending for approval.       Last Office Visit With PCP:  1/22/2021    Next Visit Date:  Future Appointments   Date Time Provider Cresencio Pisano   4/22/2021  8:30 AM CLARISSA Adams 79 NA Decreased  nutrient needs (CHO, Na)

## 2021-02-27 NOTE — PROGRESS NOTE ADULT - SUBJECTIVE AND OBJECTIVE BOX
[facility]   Cardiology II Service / Advanced Heart Failure  Daily Progress Note  Date of Service: 2/27/2021      Patient: Jim Willingham  MRN: [id number]  Admission Date: 2/8/2021  Hospital Day # 19    Assessment and Plan: Jim Willingham is a 61 year old male with history of NICM EF 20% c/b VT s/p CRT-D s/p HMII LVAD 6/19/2017 originally intended as destination therapy 2/2 obesity however with recent weight loss now candidate for transplantation. He is admitted for worsening functional status and renal function concerning for worsening heart failure. He is now listed status 2E for transplant.    Chronic systolic heart failure secondary to NICM s/p HM II LVAD. Echo 1/8/21 at 9400rpm, EF<30%m LVIDd 6.8cm, at least mildly reduced RV function, AoV closed without AI, mild-mod eccentric MR, dilated IVC without collapse. RHC 2/23 RA 7 PA 26/8(15) PCWP 6 Kee C/CO 4.6/2.2 TD CO/CI 5.5/2.6.    Stage D, NYHA Class III  ACEi/ARB contraindicated due to renal dysfunction. Hydralazine 75 mg po TID. Isordil 10 mg po TID.   BB contraindicated due to low cardiac output  Aldosterone antagonist contraindicated due to renal dysfunction  SCD prophylaxis CRT-D  Fluid status Euvolemic. Bumex 3 mg po BID.   MAP: 76  LDH: 278. Repeated this AM and trend in setting of high flows and power spikes. Consider Ramp if persists.   Anticoagulation: Coumadin per pharmacy. INR- 2.49, goal 2-3.   Antiplatelet: ASA 81 mg po daily  - remains on the cardiac transplant list status 2E.      The patient's HeartMate LVAD was interrogated 2/27/2021  * Speed 9600 rpm   * Pulsatility index 4.3   * Power 7 Manuel   * Flow 6.7 L/minute   Fluid status: Euvolemic   Alarms were reviewed, and notable for PI events, elevated garcia with speed drops, and elevated flows.   The driveline exit site was covered with dressing clean, dry, and intact.   All external components were inspected and showed no evidence of damage or malfunction.     Carpel tunnel,  [facility] -- Bolivar Carbajal, Brittany Gallegos, Reynaldo Sweeney Savella  Office # [phone]    Follow Up:  PE, Recent NSTEMI    Subjective/Observations:  Awake and alert, comfortable on RA.  Denies any respiratory discomfort.  Constant pressure-like CP slightly improved, still worse on deep-inspiration. Overnight, he remained sinus johnny on tele at 40's-50's, though remained hemodynamically stable and asymptomatic    REVIEW OF SYSTEMS: All other review of systems is negative unless indicated above    PAST MEDICAL & SURGICAL HISTORY:  BPH (benign prostatic hyperplasia)  Hypertension  Afib  Diabetes mellitus with no complication  H/O abdominal surgery    MEDICATIONS  (STANDING):  amiodarone    Tablet 100 milliGRAM(s) Oral daily  aspirin enteric coated 81 milliGRAM(s) Oral daily  atorvastatin 80 milliGRAM(s) Oral at bedtime  dextrose 5%. 1000 milliLiter(s) (50 mL/Hr) IV Continuous <Continuous>  dextrose 50% Injectable 12.5 Gram(s) IV Push once  dextrose 50% Injectable 25 Gram(s) IV Push once  dextrose 50% Injectable 25 Gram(s) IV Push once  docusate sodium 100 milliGRAM(s) Oral three times a day  furosemide    Tablet 40 milliGRAM(s) Oral daily  heparin  Infusion.  Unit(s)/Hr (15 mL/Hr) IV Continuous <Continuous>  insulin glargine Injectable (LANTUS) 10 Unit(s) SubCutaneous at bedtime  insulin lispro (HumaLOG) corrective regimen sliding scale   SubCutaneous three times a day before meals  insulin lispro (HumaLOG) corrective regimen sliding scale   SubCutaneous at bedtime  insulin lispro Injectable (HumaLOG) 3 Unit(s) SubCutaneous three times a day before meals  nicotine - 21 mG/24Hr(s) Patch 1 patch Transdermal daily  pantoprazole    Tablet 40 milliGRAM(s) Oral two times a day  polyethylene glycol 3350 17 Gram(s) Oral at bedtime  senna 2 Tablet(s) Oral at bedtime  sucralfate 1 Gram(s) Oral two times a day  tamsulosin 0.4 milliGRAM(s) Oral at bedtime    MEDICATIONS  (PRN):  ALBUTerol    90 MICROgram(s) HFA Inhaler 2 Puff(s) Inhalation every 6 hours PRN Shortness of Breath and/or Wheezing  dextrose 40% Gel 15 Gram(s) Oral once PRN Blood Glucose LESS THAN 70 milliGRAM(s)/deciliter  glucagon  Injectable 1 milliGRAM(s) IntraMuscular once PRN Glucose LESS THAN 70 milligrams/deciliter  heparin  Injectable 6500 Unit(s) IV Push every 6 hours PRN For aPTT less than 40  heparin  Injectable 3000 Unit(s) IV Push every 6 hours PRN For aPTT between 40 - 57    Allergies    fish (Hives)  No Known Drug Allergies    Intolerances    Vital Signs Last 24 Hrs  T(C): 36.7 (10 Paul 2019 07:59), Max: 36.9 (09 Jun 2019 16:16)  T(F): 98.1 (10 Paul 2019 07:59), Max: 98.5 (09 Jun 2019 16:16)  HR: 59 (10 Paul 2019 07:59) (47 - 59)  BP: 101/67 (10 Paul 2019 07:59) (101/67 - 123/74)  BP(mean): --  RR: 18 (10 Paul 2019 07:59) (18 - 20)  SpO2: 96% (10 Paul 2019 07:59) (96% - 100%)    I&O's Summary    09 Jun 2019 07:01  -  10 Paul 2019 07:00  --------------------------------------------------------  IN: 2235 mL / OUT: 1250 mL / NET: 985 mL    PHYSICAL EXAM:  TELE: Sinus johnny, 40's-50's  Constitutional: NAD, awake and alert, well-developed  HEENT: Moist Mucous Membranes, Anicteric  Pulmonary: Non-labored, breath sounds are clear bilaterally, No wheezing, rales or rhonchi  Cardiovascular: Regular, S1 and S2, No murmurs, rubs, gallops or clicks  Gastrointestinal: Bowel Sounds present, soft, nontender.   Lymph: 2+ BLE edema. No lymphadenopathy.  Skin: No visible rashes.  Left foot ulcers  Psych:  Mood & affect appropriate    LABS: All Labs Reviewed:                        9.5    6.12  )-----------( 267      ( 10 Paul 2019 05:47 )             30.2                         9.9    5.86  )-----------( 293      ( 09 Jun 2019 08:30 )             31.0                         9.3    5.84  )-----------( 300      ( 08 Jun 2019 08:10 )             29.9     10 Paul 2019 05:47    142    |  108    |  13     ----------------------------<  139    3.8     |  28     |  1.30   09 Jun 2019 08:30    144    |  110    |  13     ----------------------------<  116    4.1     |  27     |  1.20   08 Jun 2019 08:10    144    |  110    |  12     ----------------------------<  97     4.0     |  27     |  1.10     Ca    8.4        10 Paul 2019 05:47  Ca    8.6        09 Jun 2019 08:30  Ca    8.6        08 Jun 2019 08:10  Mg     2.0       10 Paul 2019 05:47  Mg     2.2       09 Jun 2019 08:30    PT/INR - ( 10 Paul 2019 05:47 )   PT: 13.7 sec;   INR: 1.20 ratio     PTT - ( 10 Paul 2019 05:47 )  PTT:88.5 sec      < from: TTE Echo Doppler w/o Cont (05.16.19 @ 09:45) >     EXAM:  ECHO TTE WO CON COMP W DOPPLR         PROCEDURE DATE:  05/16/2019        INTERPRETATION:  INDICATION: Elevated troponin    Blood Pressure 137/62    Height 187.96 cm     Weight 81.6 kg       BSA   2.1 sq m    Dimensions:    LA 4.1       Normal Values: 2.0 - 4.0 cm    Ao 3.6        Normal Values: 2.0 - 3.8 cm  SEPTUM 1.3       Normal Values: 0.6 - 1.2 cm  PWT 1.3       Normal Values: 0.6 - 1.1 cm  LVIDd 6.0         Normal Values: 3.0 - 5.6 cm  LVIDs 3.1         Normal Values: 1.8 - 4.0 cm      OBSERVATIONS:    Mitral Valve: Thickened leaflets, moderate MR.  Aortic Valve/Aorta: Sclerotic trileaflet aortic valve with normal   opening. Trace AI  Tricuspid Valve: normal with mild TR.  Pulmonic Valve: normal  Left Atrium: Mildly dilated  Right Atrium: normal  Left Ventricle: normal LV size and systolic function, estimated LVEF of   65%. No evidence of segmental dysfunction. Basal septal hypertrophy is   present  Right Ventricle: normal size and systolic function.  Pericardium/Pleura: normal, no significant pericardial effusion.    Conclusion:     normal LV size and systolic function, estimated LVEF of 65%. No evidence   of segmental dysfunction  Normal RV size and systolic function.   Mild left atrial enlargement  Sclerotic trileaflet aortic valve with normal opening. Trace AI  Mitral valve with thickened leaflets and moderate MR  Mild TR  No significant pericardial effusion.      MIROSLAVA CASTAÑEDA   This document has been electronically signed. May 17 2019  7:45AM     < end of copied text >    < from: CT Angio Chest w/ IV Cont (06.06.19 @ 00:07) >    EXAM:  CT ANGIO CHEST (W)AW IC                            PROCEDURE DATE:  06/06/2019          INTERPRETATION:  CLINICAL INFORMATION: Chest pain.    COMPARISON: None.    PROCEDURE:   CT Angiography of the Chest.  95 ml of Omnipaque 350 was injectedintravenously. 5 ml were discarded.  Sagittal and coronal reformats were performed as well as 3D (MIP)   reconstructions.      FINDINGS:    LUNGS AND AIRWAYS: Patent central airways.  Paraseptal and centrilobular   emphysema. Few scattered small pulmonary nodules up to 4 mm within the   right lower lobe along the major fissure (series 3, image 61). Bilateral   lower lobe subsegmental atelectasis.    PLEURA: Trace left pleural effusion.    MEDIASTINUM AND ANAYA: No lymphadenopathy.    VESSELS: Adequate opacification the pulmonary arteries. Subsegmental   right upper lobe filling defects consistent with pulmonary emboli.   Question subsegmental right middle lobe pulmonary embolus.    HEART: Heart size is normal. No pericardial effusion. Coronaryartery   calcification.    CHEST WALL AND LOWER NECK: Mild bilateral gynecomastia.     VISUALIZED UPPER ABDOMEN: Midline postsurgical change within the upper   abdominal wall.    BONES: Chronic fracture deformity of the left posterolateral eighth and  ninth ribs. Multilevel degenerative changes of the spine.    IMPRESSION:     Subsegmental right upper lobe pulmonary emboli. Question right middle   lobe subsegmental pulmonary embolus.    Emphysema.    Few scattered pulmonary nodules measuring up to 4 mm. 1 year interval   noncontrast chest CT may be obtained to demonstrate stability.    Dr. Flores discussed the above findings with Dr. Sauceda at 1:04 AM on   6/6/2019 with read back.    ABHISHEK FLORES M.D., ATTENDING RADIOLOGIST  This document has been electronically signed. Jun 6 2019  1:09AM     < end of copied text >    < from: Xray Chest 1 View AP/PA (06.05.19 @ 18:48) >    EXAM:  XR CHEST AP OR PA 1V                          PROCEDURE DATE:  06/05/2019      INTERPRETATION:  Chest pain.    AP chest. Prior 5/12/2019.    Heart magnified by projection. Symmetrically hyperinflated lungs.   Nonspecific accentuated bibasilar markings likely reflect fibrotic   atelectatic changes. No focal consolidation or pleural effusion.    Impression: Hyperinflated lungs. No active infiltrates.    WENDI HANSON M.D., ATTENDING RADIOLOGIST  This document has beenelectronically signed. Jun 6 2019  8:37AM      < end of copied text > bilateral s/p bilateral release. Evidence of thenar atrophy. Relief with steroid injection 11/20. Carpal tunnel release 2/18/21.  - Appreciate Ortho/Plastics consult.  - Off narcotics.   - Scheduled Tylenol 650 mg po QID.   - Gabapentin 300 mg in AM, 600 mg in the afternoon, and 600 mg in the evening.      WALTER on CKD Stage III. Cr peaked at 2.22  - Cr-1.88.     History of Afib. History of VT/VF. CHADSVASC-5.   - Continue Amiodarone 200 mg po daily.   - Coumadin as above.      Polyarticular Acute Gout. Minimal improvement with Colchicine times one.   - Appreciate Rheumatology consult.   - Anakinra 100 mg subcutaneous daily for 5 days completed.     DM type II, controlled. Symptomatic Hypoglycemia: Hgb A1C-6.2. Lantus and Novololg sliding scale insulin discontinued.   -  If blood glucose <70, check serum glucose, C peptide, and pro-insulin. Consult Endo if further hypoglycemia.      Rhinovirus, resolved. Bacterial bronchitis. Completed 5 day course of Cefdinir. COVID negative 2/8. Resp PCR +rhinovirus. CXR 2/12 with no overt PNA. Stopped cefdinir 2/15. Cleared for transplant per ID.   - repeat RVP-3/5 per transplant ID     Chronic/resolved conditions  COPD/Asthma: pta Breo Ellipta, albuterol prn.   Depression: pta Bupropion  Right sided weakness, resolved. CT head negative for acute findings. Appreciate Neuro eval.   Staph Hominis Bacteremia. No need for surveillance blood cx per transplant ID.       FEN: 2 gram sodium diet   PROPHY:  Coumadin  LINES:  PIV   DISPO:  TBD pending cardiac transplant.   CODE STATUS: Full Code   ================================================================  Interval History/ROS: He notes improvement in pain to left hand. He denies fever, chills, chest pain, palpitations, cough, nausea, vomiting, diarrhea, melena, hematochezia, orthopnea, and concerns at his drive line site. He denies RESENDIZ this AM, but hasn't walked in the halls yet. He notes mild LE edema and abdominal distention. He is  "tolerating oral intake.     Last 24 hr care team notes reviewed.   ROS:  4 point ROS including Respiratory, CV, GI and , other than that noted in the HPI, is negative.     Medications: Reviewed in EPIC.     Physical Exam:   BP (!) 84/68 (BP Location: Left arm)   Pulse 69   Temp 98.1  F (36.7  C) (Oral)   Resp 16   Ht 1.727 m (5' 8\")   Wt 99.4 kg (219 lb 3.2 oz)   SpO2 92%   BMI 33.33 kg/m    GENERAL: Appears alert and oriented times three.   HEENT: Eye symmetrical and free of discharge bilaterally. Mucous membranes moist and without lesions.  NECK: Supple and without lymphadenopathy. JVD at clavicular line.   CV: RRR, S1S2 present with LVAD hum.   RESPIRATORY: Respirations regular, even, and unlabored. Lungs CTA throughout.   GI: Soft and non distended with normoactive bowel sounds present in all quadrants. No tenderness, rebound, guarding. No organomegaly.   EXTREMITIES: +1 bilateral LE peripheral edema. 2+ bilateral pedal pulses.   NEUROLOGIC: Alert and orientated x 3. CN II-XII grossly intact. No focal deficits.   MUSCULOSKELETAL: No joint swelling or tenderness.   SKIN: No jaundice. No rashes or lesions. LVAD drive line covered.     Data:  CMP  Recent Labs   Lab 02/27/21  0531 02/26/21  0558 02/25/21  0534 02/24/21  0610 02/23/21  0614    135 135 135 135   POTASSIUM 3.8 4.0 4.1 3.6 3.8   CHLORIDE 106 102 104 103 102   CO2 28 28 26 26 28   ANIONGAP 4 5 6 7 5   * 90 121* 91 101*   BUN 42* 40* 37* 36* 36*   CR 1.88* 1.80* 1.69* 1.91* 1.68*   GFRESTIMATED 37* 39* 42* 36* 42*   GFRESTBLACK 43* 45* 49* 42* 49*   QAMAR 8.4* 8.3* 8.5 8.7 8.6   MAG 2.2 2.3 2.3 2.3 2.3   PROTTOTAL  --  6.3*  --   --  6.4*   ALBUMIN  --  3.3*  --   --  3.3*   BILITOTAL  --  0.6  --   --  0.6   ALKPHOS  --  104  --   --  96   AST  --  42  --   --  32   ALT  --  38  --   --  33     CBC  Recent Labs   Lab 02/27/21  0531 02/25/21  0534 02/23/21  0614 02/21/21  0535   WBC 5.2 5.5 5.9 5.1   RBC 3.58* 3.92* 3.85* 4.07*   HGB " 10.1* 10.6* 10.8* 11.2*   HCT 33.1* 35.6* 34.7* 37.0*   MCV 93 91 90 91   MCH 28.2 27.0 28.1 27.5   MCHC 30.5* 29.8* 31.1* 30.3*   RDW 19.3* 19.5* 19.0* 18.8*    296 268 266     INR  Recent Labs   Lab 02/27/21  0531 02/26/21  0558 02/25/21  0534 02/24/21  0610   INR 2.49* 2.79* 2.59* 2.51*       Patient discussed with Dr. Bautista.      Soraya Marshall FN  2/27/2021

## 2021-03-16 NOTE — ED ADULT TRIAGE NOTE - MODE OF ARRIVAL
Walk in Private Auto Render In Strict Bullet Format?: No Plan: Discussed use of spironolactone and side effects, would like to start with topicals first. Continue Regimen: Differin gel qhs as kacie Detail Level: Simple Initiate Treatment: Clindamycin lotion bid prn, sodium sulfacetamide/sulfur (8/4) wash qd-bid

## 2021-06-10 NOTE — PATIENT PROFILE ADULT - BRADEN FRICTION AND SHEAR
Procedure(s):  ESOPHAGOGASTRODUODENOSCOPY (EGD)/ BMMI 27 ROOM 502. total IV anesthesia    Anesthesia Post Evaluation        Patient location during evaluation: PACU  Patient participation: complete - patient participated  Level of consciousness: awake  Pain management: satisfactory to patient  Airway patency: patent  Anesthetic complications: no  Cardiovascular status: hemodynamically stable  Respiratory status: spontaneous ventilation  Hydration status: euvolemic  Post anesthesia nausea and vomiting:  none      INITIAL Post-op Vital signs:   Vitals Value Taken Time   /81 06/10/21 1318   Temp 36.5 °C (97.7 °F) 06/10/21 1300   Pulse 82 06/10/21 1328   Resp 12 06/10/21 1318   SpO2 99 % 06/10/21 1328   Vitals shown include unvalidated device data.
(2) potential problem

## 2021-06-16 ENCOUNTER — APPOINTMENT (OUTPATIENT)
Dept: UROLOGY | Facility: CLINIC | Age: 51
End: 2021-06-16

## 2021-06-16 ENCOUNTER — EMERGENCY (EMERGENCY)
Facility: HOSPITAL | Age: 51
LOS: 1 days | Discharge: HOME | End: 2021-06-16
Attending: STUDENT IN AN ORGANIZED HEALTH CARE EDUCATION/TRAINING PROGRAM | Admitting: STUDENT IN AN ORGANIZED HEALTH CARE EDUCATION/TRAINING PROGRAM
Payer: COMMERCIAL

## 2021-06-16 VITALS
SYSTOLIC BLOOD PRESSURE: 154 MMHG | HEIGHT: 74 IN | DIASTOLIC BLOOD PRESSURE: 84 MMHG | HEART RATE: 68 BPM | TEMPERATURE: 98 F | RESPIRATION RATE: 16 BRPM | OXYGEN SATURATION: 97 %

## 2021-06-16 VITALS
DIASTOLIC BLOOD PRESSURE: 84 MMHG | SYSTOLIC BLOOD PRESSURE: 145 MMHG | RESPIRATION RATE: 16 BRPM | HEART RATE: 55 BPM | TEMPERATURE: 98 F | OXYGEN SATURATION: 95 %

## 2021-06-16 DIAGNOSIS — S98.912A COMPLETE TRAUMATIC AMPUTATION OF LEFT FOOT, LEVEL UNSPECIFIED, INITIAL ENCOUNTER: Chronic | ICD-10-CM

## 2021-06-16 DIAGNOSIS — K25.5 CHRONIC OR UNSPECIFIED GASTRIC ULCER WITH PERFORATION: Chronic | ICD-10-CM

## 2021-06-16 DIAGNOSIS — Z98.890 OTHER SPECIFIED POSTPROCEDURAL STATES: Chronic | ICD-10-CM

## 2021-06-16 LAB
ALBUMIN SERPL ELPH-MCNC: 3.3 G/DL — SIGNIFICANT CHANGE UP (ref 3.3–5)
ALP SERPL-CCNC: 124 U/L — HIGH (ref 40–120)
ALT FLD-CCNC: 19 U/L — SIGNIFICANT CHANGE UP (ref 12–78)
ANION GAP SERPL CALC-SCNC: 4 MMOL/L — LOW (ref 5–17)
AST SERPL-CCNC: 14 U/L — LOW (ref 15–37)
BASOPHILS # BLD AUTO: 0.05 K/UL — SIGNIFICANT CHANGE UP (ref 0–0.2)
BASOPHILS NFR BLD AUTO: 0.7 % — SIGNIFICANT CHANGE UP (ref 0–2)
BILIRUB SERPL-MCNC: 0.4 MG/DL — SIGNIFICANT CHANGE UP (ref 0.2–1.2)
BUN SERPL-MCNC: 13 MG/DL — SIGNIFICANT CHANGE UP (ref 7–23)
CALCIUM SERPL-MCNC: 9 MG/DL — SIGNIFICANT CHANGE UP (ref 8.5–10.1)
CHLORIDE SERPL-SCNC: 104 MMOL/L — SIGNIFICANT CHANGE UP (ref 96–108)
CO2 SERPL-SCNC: 31 MMOL/L — SIGNIFICANT CHANGE UP (ref 22–31)
CREAT SERPL-MCNC: 0.94 MG/DL — SIGNIFICANT CHANGE UP (ref 0.5–1.3)
D DIMER BLD IA.RAPID-MCNC: 282 NG/ML DDU — HIGH
EOSINOPHIL # BLD AUTO: 0.47 K/UL — SIGNIFICANT CHANGE UP (ref 0–0.5)
EOSINOPHIL NFR BLD AUTO: 6.4 % — HIGH (ref 0–6)
GLUCOSE SERPL-MCNC: 172 MG/DL — HIGH (ref 70–99)
HCT VFR BLD CALC: 38.1 % — LOW (ref 39–50)
HGB BLD-MCNC: 12.8 G/DL — LOW (ref 13–17)
IMM GRANULOCYTES NFR BLD AUTO: 0.3 % — SIGNIFICANT CHANGE UP (ref 0–1.5)
LIDOCAIN IGE QN: 44 U/L — LOW (ref 73–393)
LYMPHOCYTES # BLD AUTO: 1.07 K/UL — SIGNIFICANT CHANGE UP (ref 1–3.3)
LYMPHOCYTES # BLD AUTO: 14.5 % — SIGNIFICANT CHANGE UP (ref 13–44)
MCHC RBC-ENTMCNC: 30.4 PG — SIGNIFICANT CHANGE UP (ref 27–34)
MCHC RBC-ENTMCNC: 33.6 GM/DL — SIGNIFICANT CHANGE UP (ref 32–36)
MCV RBC AUTO: 90.5 FL — SIGNIFICANT CHANGE UP (ref 80–100)
MONOCYTES # BLD AUTO: 0.73 K/UL — SIGNIFICANT CHANGE UP (ref 0–0.9)
MONOCYTES NFR BLD AUTO: 9.9 % — SIGNIFICANT CHANGE UP (ref 2–14)
NEUTROPHILS # BLD AUTO: 5.02 K/UL — SIGNIFICANT CHANGE UP (ref 1.8–7.4)
NEUTROPHILS NFR BLD AUTO: 68.2 % — SIGNIFICANT CHANGE UP (ref 43–77)
NRBC # BLD: 0 /100 WBCS — SIGNIFICANT CHANGE UP (ref 0–0)
PLATELET # BLD AUTO: 225 K/UL — SIGNIFICANT CHANGE UP (ref 150–400)
POTASSIUM SERPL-MCNC: 4.3 MMOL/L — SIGNIFICANT CHANGE UP (ref 3.5–5.3)
POTASSIUM SERPL-SCNC: 4.3 MMOL/L — SIGNIFICANT CHANGE UP (ref 3.5–5.3)
PROT SERPL-MCNC: 7.5 G/DL — SIGNIFICANT CHANGE UP (ref 6–8.3)
RBC # BLD: 4.21 M/UL — SIGNIFICANT CHANGE UP (ref 4.2–5.8)
RBC # FLD: 13.1 % — SIGNIFICANT CHANGE UP (ref 10.3–14.5)
SODIUM SERPL-SCNC: 139 MMOL/L — SIGNIFICANT CHANGE UP (ref 135–145)
TROPONIN I SERPL-MCNC: 0.03 NG/ML — SIGNIFICANT CHANGE UP (ref 0.01–0.04)
WBC # BLD: 7.36 K/UL — SIGNIFICANT CHANGE UP (ref 3.8–10.5)
WBC # FLD AUTO: 7.36 K/UL — SIGNIFICANT CHANGE UP (ref 3.8–10.5)

## 2021-06-16 PROCEDURE — 93010 ELECTROCARDIOGRAM REPORT: CPT

## 2021-06-16 PROCEDURE — 71275 CT ANGIOGRAPHY CHEST: CPT | Mod: 26,MA

## 2021-06-16 PROCEDURE — 93005 ELECTROCARDIOGRAM TRACING: CPT

## 2021-06-16 PROCEDURE — 99284 EMERGENCY DEPT VISIT MOD MDM: CPT

## 2021-06-16 PROCEDURE — 71045 X-RAY EXAM CHEST 1 VIEW: CPT | Mod: 26

## 2021-06-16 PROCEDURE — 80053 COMPREHEN METABOLIC PANEL: CPT

## 2021-06-16 PROCEDURE — 36415 COLL VENOUS BLD VENIPUNCTURE: CPT

## 2021-06-16 PROCEDURE — 85025 COMPLETE CBC W/AUTO DIFF WBC: CPT

## 2021-06-16 PROCEDURE — 99285 EMERGENCY DEPT VISIT HI MDM: CPT

## 2021-06-16 PROCEDURE — 99284 EMERGENCY DEPT VISIT MOD MDM: CPT | Mod: 25

## 2021-06-16 PROCEDURE — 84484 ASSAY OF TROPONIN QUANT: CPT

## 2021-06-16 PROCEDURE — 71275 CT ANGIOGRAPHY CHEST: CPT

## 2021-06-16 PROCEDURE — 71045 X-RAY EXAM CHEST 1 VIEW: CPT

## 2021-06-16 PROCEDURE — 85379 FIBRIN DEGRADATION QUANT: CPT

## 2021-06-16 PROCEDURE — 83690 ASSAY OF LIPASE: CPT

## 2021-06-16 RX ORDER — FAMOTIDINE 10 MG/ML
20 INJECTION INTRAVENOUS DAILY
Refills: 0 | Status: DISCONTINUED | OUTPATIENT
Start: 2021-06-16 | End: 2021-06-19

## 2021-06-16 RX ORDER — FAMOTIDINE 10 MG/ML
20 INJECTION INTRAVENOUS DAILY
Refills: 0 | Status: DISCONTINUED | OUTPATIENT
Start: 2021-06-16 | End: 2021-06-16

## 2021-06-16 RX ADMIN — Medication 30 MILLILITER(S): at 10:22

## 2021-06-16 RX ADMIN — FAMOTIDINE 20 MILLIGRAM(S): 10 INJECTION INTRAVENOUS at 10:23

## 2021-06-16 NOTE — CONSULT NOTE ADULT - ATTENDING COMMENTS
I personally saw and examined the patient in detail.  I have spoken to the above provider regarding the assessment and plan of care.  I reviewed the above assessment and plan of care, and agree.  I have made changes in the body of the note where appropriate.  Rule out with one more set of enzymes.  Can have outpatient stress tst.

## 2021-06-16 NOTE — ED PROVIDER NOTE - PATIENT PORTAL LINK FT
You can access the FollowMyHealth Patient Portal offered by [facility] by registering at the following website: [email or web address]. By joining [employer]’s FollowMyHealth portal, you will also be able to view your health information using other applications (apps) compatible with our system.

## 2021-06-16 NOTE — ED ADULT NURSE NOTE - ED STAT RN HANDOFF DETAILS
Pt stable and resting in bed. Pt d/c bed to nursing facility. Verbal report given to RN Tushar Lopez at [facility].

## 2021-06-16 NOTE — ED PROVIDER NOTE - NSFOLLOWUPINSTRUCTIONS_ED_ALL_ED_FT
Please follow up with your Primary Care Physician and cardiology as soon as possible.  Please take your medications as prescribed.  If your symptoms persist or worsen, please seek care. Either return to the Emergency Department, go to urgent care or see your primary care doctor.     ====================================================================   Chest Pain    WHAT YOU NEED TO KNOW:    Chest pain can be caused by a range of conditions, from not serious to life-threatening. Chest pain can be a symptom of a digestive problem, such as acid reflux or a stomach ulcer. An anxiety attack or a strong emotion, such as anger, can also cause chest pain. Infection, inflammation, or a fracture in the bones or cartilage in your chest can cause pain or discomfort. Sometimes chest pain or pressure is caused by poor blood flow to your heart (angina). Chest pain may also be caused by life-threatening conditions such as a heart attack or blood clot in your lungs.     DISCHARGE INSTRUCTIONS:    Call 911 if:     You have any of the following signs of a heart attack:   Squeezing, pressure, or pain in your chest       and any of the following:   Discomfort or pain in your back, neck, jaw, stomach, or arm       Shortness of breath      Nausea or vomiting      Lightheadedness or a sudden cold sweat        Return to the emergency department if:     You have chest discomfort that gets worse, even with medicine.      You cough or vomit blood.       Your bowel movements are black or bloody.       You cannot stop vomiting, or it hurts to swallow.     Contact your healthcare provider if:     You have questions or concerns about your condition or care.        Medicines:     Medicines may be given to treat the cause of your chest pain. Examples include pain medicine, anxiety medicine, or medicines to increase blood flow to your heart.       Do not take certain medicines without asking your healthcare provider first. These include NSAIDs, herbal or vitamin supplements, or hormones (estrogen or progestin).       Take your medicine as directed. Contact your healthcare provider if you think your medicine is not helping or if you have side effects. Tell him or her if you are allergic to any medicine. Keep a list of the medicines, vitamins, and herbs you take. Include the amounts, and when and why you take them. Bring the list or the pill bottles to follow-up visits. Carry your medicine list with you in case of an emergency.    Follow up with your healthcare provider within 72 hours, or as directed: You may need to return for more tests to find the cause of your chest pain. You may be referred to a specialist, such as a cardiologist or gastroenterologist. Write down your questions so you remember to ask them during your visits.     Healthy living tips: The following are general healthy guidelines. If your chest pain is caused by a heart problem, your healthcare provider will give you specific guidelines to follow.    Do not smoke. Nicotine and other chemicals in cigarettes and cigars can cause lung and heart damage. Ask your healthcare provider for information if you currently smoke and need help to quit. E-cigarettes or smokeless tobacco still contain nicotine. Talk to your healthcare provider before you use these products.       Eat a variety of healthy, low-fat, low-salt foods. Healthy foods include fruits, vegetables, whole-grain breads, low-fat dairy products, beans, lean meats, and fish. Ask for more information about a heart healthy diet.      Drink plenty of water every day. Your body is made of mostly water. Water helps your body to control your temperature and blood pressure. Ask your healthcare provider how much water you should drink every day.      Ask about activity. Your healthcare provider will tell you which activities to limit or avoid. Ask when you can drive, return to work, and have sex. Ask about the best exercise plan for you.      Maintain a healthy weight. Ask your healthcare provider how much you should weigh. Ask him or her to help you create a weight loss plan if you are overweight.       Get the flu and pneumonia vaccines. All adults should get the influenza (flu) vaccine. Get it every year as soon as it becomes available. The pneumococcal vaccine is given to adults aged 65 years or older. The vaccine is given every 5 years to prevent pneumococcal disease, such as pneumonia.    If you have a stent:     Carry your stent card with you at all times.       Let all healthcare providers know that you have a stent.

## 2021-06-16 NOTE — ED PROVIDER NOTE - NS ED ROS FT
Constitutional: No fever.  Neurological: No headache.  Eyes: No vision changes.   Ears, Nose, Mouth, Throat: No congestion.  Cardiovascular: +chest pain.  Respiratory: +difficulty breathing.  Gastrointestinal: No nausea or vomiting.  Genitourinary: No dysuria.  Musculoskeletal: No joint pain.  Integumentary (skin and/or breast): No rash.

## 2021-06-16 NOTE — CONSULT NOTE ADULT - ASSESSMENT
***charting in progress***   51 yo M with PMHx of CAD s/p BMS to RCA (8/2019), RUL subsegmental PE (6/2019, on Eliquis), hx of NSTEMI and PAF s/p ex-lap omentopexy and plication for perforated antral ulcer (5/2019),  CVA s/p tPA (8/2019), HTN and DM2, osteomyelitis, s/p debridement, s/p left TMA 5/2020, without complication p/w chest pain. Cardiology consulted for chest pain    Chest pain, unclear etiology  -EKG NSR with no acute signs of ischemia  -initial troponin negative, obtain 2 sets of cardiac enzymes 49 yo M with PMHx of CAD s/p BMS to RCA (8/2019), RUL subsegmental PE (6/2019, on Eliquis), hx of NSTEMI and PAF s/p ex-lap omentopexy and plication for perforated antral ulcer (5/2019),  CVA s/p tPA (8/2019), HTN and DM2, osteomyelitis, s/p debridement, s/p left TMA 5/2020, without complication p/w chest pain. Cardiology consulted for chest pain    Chest pain, unclear etiology  -doubt ACS, more likely musculoskeletal vs. GERD  -EKG NSR with no acute signs of ischemia  -initial troponin negative, obtain 2 sets of cardiac enzymes, if negative can be discharged back to [facility] rehab

## 2021-06-16 NOTE — ED ADULT NURSE NOTE - OBJECTIVE STATEMENT
Pt received in bed alert and oriented with the c/o mid chest pain which started since day. Pt states that the pain doesn't radiate anywhere and it is causing great discomfort. As per Md's orders IV azalia placed blood specimen obtained and sent to the lab. Nursing care ongoing and safety maintained.

## 2021-06-16 NOTE — ED PROVIDER NOTE - OBJECTIVE STATEMENT
50 m here with sharp chest pain, started 1 hour prior to arrival and is constant. worse with deep breath. 50 m here with sharp chest pain, started 1 hour prior to arrival and is constant. worse with deep breath. patient given Tylenol at nursing home, and nitro with EMS without relief. patient on eliquis. 50 m here with sharp chest pain, started 1 hour prior to arrival and is constant. worse with deep breath. patient given Tylenol at nursing home, and nitro with EMS without relief. patient on eliquis. has history of CAD and PE as well as DM, CVA. 50 m here with sharp chest pain, started 1 hour prior to arrival and is constant. worse with deep breath. patient given Tylenol at nursing home, and nitro with EMS without relief. patient on Eliquis. has history of CAD and PE as well as DM, CVA.

## 2021-06-28 ENCOUNTER — APPOINTMENT (OUTPATIENT)
Dept: UROLOGY | Facility: CLINIC | Age: 51
End: 2021-06-28

## 2021-07-21 ENCOUNTER — APPOINTMENT (OUTPATIENT)
Dept: UROLOGY | Facility: CLINIC | Age: 51
End: 2021-07-21
Payer: MEDICAID

## 2021-07-21 VITALS
DIASTOLIC BLOOD PRESSURE: 70 MMHG | WEIGHT: 220 LBS | TEMPERATURE: 98.1 F | BODY MASS INDEX: 28.23 KG/M2 | SYSTOLIC BLOOD PRESSURE: 120 MMHG | HEIGHT: 74 IN

## 2021-07-21 DIAGNOSIS — E11.9 TYPE 2 DIABETES MELLITUS W/OUT COMPLICATIONS: ICD-10-CM

## 2021-07-21 DIAGNOSIS — R33.9 RETENTION OF URINE, UNSPECIFIED: ICD-10-CM

## 2021-07-21 DIAGNOSIS — N31.9 NEUROMUSCULAR DYSFUNCTION OF BLADDER, UNSPECIFIED: ICD-10-CM

## 2021-07-21 PROCEDURE — 99204 OFFICE O/P NEW MOD 45 MIN: CPT | Mod: 25

## 2021-07-21 PROCEDURE — 51702 INSERT TEMP BLADDER CATH: CPT

## 2021-07-21 NOTE — PHYSICAL EXAM
[General Appearance - Well Developed] : well developed [General Appearance - Well Nourished] : well nourished [Normal Appearance] : normal appearance [Well Groomed] : well groomed [General Appearance - In No Acute Distress] : no acute distress [Edema] : no peripheral edema [Respiration, Rhythm And Depth] : normal respiratory rhythm and effort [Exaggerated Use Of Accessory Muscles For Inspiration] : no accessory muscle use [Abdomen Soft] : soft [Urethral Meatus] : meatus normal [Penis Abnormality] : normal circumcised penis [] : no rash [Oriented To Time, Place, And Person] : oriented to person, place, and time [Affect] : the affect was normal [Mood] : the mood was normal [Not Anxious] : not anxious [Inguinal Lymph Nodes Enlarged Bilaterally] : inguinal [Prostate Tenderness] : the prostate was not tender [No Prostate Nodules] : no prostate nodules [Prostate Size ___ gm] : prostate size [unfilled] gm [FreeTextEntry1] : hx of CVA an DM with LE peripheral neuropathy, feet in potus boots

## 2021-07-21 NOTE — ASSESSMENT
[FreeTextEntry1] : patient with hx of CVA in 2019\par at that time CT showed + jacques with Right UVJ stone and hydro \par 1) recommend JAREK to eval upper tracts\par \par patient with hx of Jacques since CVA- change monthly\par was told had either 'neurogenic bladder' or BPH \par 1) recommend URODYNAMICS done at [address] on Fridays to determine bladder function ( see attached brochure)\par \par prior to CVA no LUTS\par Jacques since CVA- change monthly\par discussed risk of urethral erosion\par 1) recommend nursing teach CIC to be done 3-4 x /day by patient --patient interested\par 2) if chronic drainagae needed-- recommend SP tube instead of urethral jacques\par \par

## 2021-07-21 NOTE — REVIEW OF SYSTEMS
[Shortness Of Breath] : shortness of breath [Wheezing] : wheezing [Abdominal Pain] : abdominal pain [Vomiting] : vomiting [Constipation] : constipation [Diarrhea] : diarrhea [Heartburn] : heartburn [Urine retention] : urine retention [Joint Pain] : joint pain [Joint Swelling] : joint swelling [Skin Lesions] : skin lesion [Skin Wound] : skin wound [Itching] : itching [Dizziness] : dizziness [Limb Weakness] : limb weakness [Difficulty Walking] : difficulty walking [Hot Flashes] : hot flashes [Muscle Weakness] : muscle weakness [Feelings Of Weakness] : feelings of weakness [Easy Bleeding] : a tendency for easy bleeding [Easy Bruising] : a tendency for easy bruising [Negative] : Psychiatric [Urine Infection (bladder/kidney)] : bladder/kidney infection [FreeTextEntry2] : hypertension [FreeTextEntry4] : chronic jacques

## 2021-07-21 NOTE — HISTORY OF PRESENT ILLNESS
[FreeTextEntry1] : patient with hx of CVA in 2019\par at that time CT showed + jacques with Right UVJ stone and hydro \par no f/u studies avialable to review if resolved\par 2.5 cm abd wall hernia with fat--images reviewed\par patient with hx of Jacques since CVA- change monthly\par was told had either 'neurogenic bladder' or BPH \par no studies done to determine this by report\par prior to CVA no LUTS\par latest creat June 2021: 0.94\par lives in nursing home--no family

## 2022-01-14 NOTE — CONSULT NOTE ADULT - PROBLEM SELECTOR RECOMMENDATION 9
add lantus 15 units qhs  cont humalog 5 units 3x/day before meals  change mod dose humalog scale coverage qac/qhs
noted that this does not probe to bone, recent surgery with clean margins so hope is that this is just a surgical wound infection and not deeper.  With recent surgery limited utility to MRI and recommend abx for now with wound care per podiatry, will make additional recs based on any new micro data
Rituxan Pregnancy And Lactation Text: This medication is Pregnancy Category C and it isn't know if it is safe during pregnancy. It is unknown if this medication is excreted in breast milk but similar antibodies are known to be excreted.

## 2022-01-20 NOTE — ED PROVIDER NOTE - NSCAREINITIATED _GEN_ER
Received request for medical records from [address] [place] [address]. Request is for Complete medical record for the  Last 3 years. Request sent to [facility].
Ed Sorenson(Attending)

## 2022-01-28 ENCOUNTER — INPATIENT (INPATIENT)
Facility: HOSPITAL | Age: 52
LOS: 6 days | Discharge: NURSING, REHAB OR LONG-TERM CARE | DRG: 392 | End: 2022-02-04
Attending: FAMILY MEDICINE | Admitting: FAMILY MEDICINE
Payer: COMMERCIAL

## 2022-01-28 VITALS
HEIGHT: 74 IN | DIASTOLIC BLOOD PRESSURE: 82 MMHG | WEIGHT: 220.02 LBS | OXYGEN SATURATION: 97 % | SYSTOLIC BLOOD PRESSURE: 118 MMHG | HEART RATE: 112 BPM | TEMPERATURE: 101 F | RESPIRATION RATE: 18 BRPM

## 2022-01-28 DIAGNOSIS — K51.00 ULCERATIVE (CHRONIC) PANCOLITIS WITHOUT COMPLICATIONS: ICD-10-CM

## 2022-01-28 DIAGNOSIS — Z98.890 OTHER SPECIFIED POSTPROCEDURAL STATES: Chronic | ICD-10-CM

## 2022-01-28 DIAGNOSIS — K25.5 CHRONIC OR UNSPECIFIED GASTRIC ULCER WITH PERFORATION: Chronic | ICD-10-CM

## 2022-01-28 DIAGNOSIS — S98.912A COMPLETE TRAUMATIC AMPUTATION OF LEFT FOOT, LEVEL UNSPECIFIED, INITIAL ENCOUNTER: Chronic | ICD-10-CM

## 2022-01-28 LAB
ALBUMIN SERPL ELPH-MCNC: 3.8 G/DL — SIGNIFICANT CHANGE UP (ref 3.3–5)
ALP SERPL-CCNC: 134 U/L — HIGH (ref 40–120)
ALT FLD-CCNC: 26 U/L — SIGNIFICANT CHANGE UP (ref 12–78)
ANION GAP SERPL CALC-SCNC: 7 MMOL/L — SIGNIFICANT CHANGE UP (ref 5–17)
APPEARANCE UR: ABNORMAL
AST SERPL-CCNC: 18 U/L — SIGNIFICANT CHANGE UP (ref 15–37)
BASE EXCESS BLDV CALC-SCNC: -1.1 MMOL/L — SIGNIFICANT CHANGE UP (ref -2–3)
BASOPHILS # BLD AUTO: 0.01 K/UL — SIGNIFICANT CHANGE UP (ref 0–0.2)
BASOPHILS NFR BLD AUTO: 0.1 % — SIGNIFICANT CHANGE UP (ref 0–2)
BILIRUB SERPL-MCNC: 0.5 MG/DL — SIGNIFICANT CHANGE UP (ref 0.2–1.2)
BILIRUB UR-MCNC: ABNORMAL
BUN SERPL-MCNC: 22 MG/DL — SIGNIFICANT CHANGE UP (ref 7–23)
CALCIUM SERPL-MCNC: 9.2 MG/DL — SIGNIFICANT CHANGE UP (ref 8.5–10.1)
CHLORIDE SERPL-SCNC: 105 MMOL/L — SIGNIFICANT CHANGE UP (ref 96–108)
CO2 SERPL-SCNC: 25 MMOL/L — SIGNIFICANT CHANGE UP (ref 22–31)
COLOR SPEC: YELLOW — SIGNIFICANT CHANGE UP
CREAT SERPL-MCNC: 1.2 MG/DL — SIGNIFICANT CHANGE UP (ref 0.5–1.3)
DIFF PNL FLD: ABNORMAL
EOSINOPHIL # BLD AUTO: 0 K/UL — SIGNIFICANT CHANGE UP (ref 0–0.5)
EOSINOPHIL NFR BLD AUTO: 0 % — SIGNIFICANT CHANGE UP (ref 0–6)
GAS PNL BLDV: SIGNIFICANT CHANGE UP
GLUCOSE SERPL-MCNC: 186 MG/DL — HIGH (ref 70–99)
GLUCOSE UR QL: NEGATIVE — SIGNIFICANT CHANGE UP
HCO3 BLDV-SCNC: 23 MMOL/L — SIGNIFICANT CHANGE UP (ref 22–29)
HCT VFR BLD CALC: 48.7 % — SIGNIFICANT CHANGE UP (ref 39–50)
HGB BLD-MCNC: 16.4 G/DL — SIGNIFICANT CHANGE UP (ref 13–17)
IMM GRANULOCYTES NFR BLD AUTO: 0.3 % — SIGNIFICANT CHANGE UP (ref 0–1.5)
KETONES UR-MCNC: ABNORMAL
LACTATE SERPL-SCNC: 1.4 MMOL/L — SIGNIFICANT CHANGE UP (ref 0.7–2)
LEUKOCYTE ESTERASE UR-ACNC: ABNORMAL
LIDOCAIN IGE QN: 58 U/L — LOW (ref 73–393)
LYMPHOCYTES # BLD AUTO: 0.23 K/UL — LOW (ref 1–3.3)
LYMPHOCYTES # BLD AUTO: 2.4 % — LOW (ref 13–44)
MCHC RBC-ENTMCNC: 29.9 PG — SIGNIFICANT CHANGE UP (ref 27–34)
MCHC RBC-ENTMCNC: 33.7 GM/DL — SIGNIFICANT CHANGE UP (ref 32–36)
MCV RBC AUTO: 88.9 FL — SIGNIFICANT CHANGE UP (ref 80–100)
MONOCYTES # BLD AUTO: 0.93 K/UL — HIGH (ref 0–0.9)
MONOCYTES NFR BLD AUTO: 9.8 % — SIGNIFICANT CHANGE UP (ref 2–14)
NEUTROPHILS # BLD AUTO: 8.33 K/UL — HIGH (ref 1.8–7.4)
NEUTROPHILS NFR BLD AUTO: 87.4 % — HIGH (ref 43–77)
NITRITE UR-MCNC: NEGATIVE — SIGNIFICANT CHANGE UP
NRBC # BLD: 0 /100 WBCS — SIGNIFICANT CHANGE UP (ref 0–0)
PCO2 BLDV: 31 MMHG — LOW (ref 42–55)
PH BLDV: 7.47 — HIGH (ref 7.32–7.43)
PH UR: 5 — SIGNIFICANT CHANGE UP (ref 5–8)
PLATELET # BLD AUTO: 214 K/UL — SIGNIFICANT CHANGE UP (ref 150–400)
PO2 BLDV: 117 MMHG — HIGH (ref 25–45)
POTASSIUM SERPL-MCNC: 4.2 MMOL/L — SIGNIFICANT CHANGE UP (ref 3.5–5.3)
POTASSIUM SERPL-SCNC: 4.2 MMOL/L — SIGNIFICANT CHANGE UP (ref 3.5–5.3)
PROT SERPL-MCNC: 8.4 G/DL — HIGH (ref 6–8.3)
PROT UR-MCNC: 30 MG/DL
RBC # BLD: 5.48 M/UL — SIGNIFICANT CHANGE UP (ref 4.2–5.8)
RBC # FLD: 13.1 % — SIGNIFICANT CHANGE UP (ref 10.3–14.5)
SAO2 % BLDV: 97.8 % — HIGH (ref 67–88)
SARS-COV-2 RNA SPEC QL NAA+PROBE: SIGNIFICANT CHANGE UP
SODIUM SERPL-SCNC: 137 MMOL/L — SIGNIFICANT CHANGE UP (ref 135–145)
SP GR SPEC: 1.01 — SIGNIFICANT CHANGE UP (ref 1.01–1.02)
UROBILINOGEN FLD QL: NEGATIVE — SIGNIFICANT CHANGE UP
WBC # BLD: 9.53 K/UL — SIGNIFICANT CHANGE UP (ref 3.8–10.5)
WBC # FLD AUTO: 9.53 K/UL — SIGNIFICANT CHANGE UP (ref 3.8–10.5)

## 2022-01-28 PROCEDURE — G1004: CPT

## 2022-01-28 PROCEDURE — 74177 CT ABD & PELVIS W/CONTRAST: CPT | Mod: 26,ME

## 2022-01-28 PROCEDURE — 99285 EMERGENCY DEPT VISIT HI MDM: CPT

## 2022-01-28 PROCEDURE — 71045 X-RAY EXAM CHEST 1 VIEW: CPT | Mod: 26

## 2022-01-28 PROCEDURE — 99221 1ST HOSP IP/OBS SF/LOW 40: CPT

## 2022-01-28 RX ORDER — SODIUM CHLORIDE 9 MG/ML
1000 INJECTION INTRAMUSCULAR; INTRAVENOUS; SUBCUTANEOUS ONCE
Refills: 0 | Status: DISCONTINUED | OUTPATIENT
Start: 2022-01-28 | End: 2022-01-28

## 2022-01-28 RX ORDER — APIXABAN 2.5 MG/1
5 TABLET, FILM COATED ORAL
Refills: 0 | Status: DISCONTINUED | OUTPATIENT
Start: 2022-01-28 | End: 2022-02-04

## 2022-01-28 RX ORDER — INSULIN GLARGINE 100 [IU]/ML
10 INJECTION, SOLUTION SUBCUTANEOUS
Refills: 0 | Status: DISCONTINUED | OUTPATIENT
Start: 2022-01-28 | End: 2022-01-31

## 2022-01-28 RX ORDER — PIPERACILLIN AND TAZOBACTAM 4; .5 G/20ML; G/20ML
3.38 INJECTION, POWDER, LYOPHILIZED, FOR SOLUTION INTRAVENOUS ONCE
Refills: 0 | Status: COMPLETED | OUTPATIENT
Start: 2022-01-28 | End: 2022-01-28

## 2022-01-28 RX ORDER — ONDANSETRON 8 MG/1
4 TABLET, FILM COATED ORAL ONCE
Refills: 0 | Status: COMPLETED | OUTPATIENT
Start: 2022-01-28 | End: 2022-01-28

## 2022-01-28 RX ORDER — TAMSULOSIN HYDROCHLORIDE 0.4 MG/1
0.4 CAPSULE ORAL AT BEDTIME
Refills: 0 | Status: DISCONTINUED | OUTPATIENT
Start: 2022-01-28 | End: 2022-02-04

## 2022-01-28 RX ORDER — GABAPENTIN 400 MG/1
100 CAPSULE ORAL DAILY
Refills: 0 | Status: DISCONTINUED | OUTPATIENT
Start: 2022-01-28 | End: 2022-02-04

## 2022-01-28 RX ORDER — ISOSORBIDE MONONITRATE 60 MG/1
30 TABLET, EXTENDED RELEASE ORAL DAILY
Refills: 0 | Status: DISCONTINUED | OUTPATIENT
Start: 2022-01-28 | End: 2022-01-29

## 2022-01-28 RX ORDER — ATORVASTATIN CALCIUM 80 MG/1
40 TABLET, FILM COATED ORAL AT BEDTIME
Refills: 0 | Status: DISCONTINUED | OUTPATIENT
Start: 2022-01-28 | End: 2022-02-04

## 2022-01-28 RX ORDER — ASPIRIN/CALCIUM CARB/MAGNESIUM 324 MG
81 TABLET ORAL DAILY
Refills: 0 | Status: DISCONTINUED | OUTPATIENT
Start: 2022-01-28 | End: 2022-02-04

## 2022-01-28 RX ORDER — SODIUM CHLORIDE 9 MG/ML
2500 INJECTION INTRAMUSCULAR; INTRAVENOUS; SUBCUTANEOUS ONCE
Refills: 0 | Status: COMPLETED | OUTPATIENT
Start: 2022-01-28 | End: 2022-01-28

## 2022-01-28 RX ORDER — MORPHINE SULFATE 50 MG/1
2 CAPSULE, EXTENDED RELEASE ORAL EVERY 6 HOURS
Refills: 0 | Status: DISCONTINUED | OUTPATIENT
Start: 2022-01-28 | End: 2022-01-31

## 2022-01-28 RX ORDER — DEXTROSE 50 % IN WATER 50 %
15 SYRINGE (ML) INTRAVENOUS ONCE
Refills: 0 | Status: DISCONTINUED | OUTPATIENT
Start: 2022-01-28 | End: 2022-02-04

## 2022-01-28 RX ORDER — DEXTROSE 50 % IN WATER 50 %
12.5 SYRINGE (ML) INTRAVENOUS ONCE
Refills: 0 | Status: DISCONTINUED | OUTPATIENT
Start: 2022-01-28 | End: 2022-02-04

## 2022-01-28 RX ORDER — GLUCAGON INJECTION, SOLUTION 0.5 MG/.1ML
1 INJECTION, SOLUTION SUBCUTANEOUS ONCE
Refills: 0 | Status: DISCONTINUED | OUTPATIENT
Start: 2022-01-28 | End: 2022-02-04

## 2022-01-28 RX ORDER — LOSARTAN POTASSIUM 100 MG/1
25 TABLET, FILM COATED ORAL DAILY
Refills: 0 | Status: DISCONTINUED | OUTPATIENT
Start: 2022-01-28 | End: 2022-01-29

## 2022-01-28 RX ORDER — FERROUS SULFATE 325(65) MG
325 TABLET ORAL DAILY
Refills: 0 | Status: DISCONTINUED | OUTPATIENT
Start: 2022-01-28 | End: 2022-01-28

## 2022-01-28 RX ORDER — DEXTROSE 50 % IN WATER 50 %
25 SYRINGE (ML) INTRAVENOUS ONCE
Refills: 0 | Status: DISCONTINUED | OUTPATIENT
Start: 2022-01-28 | End: 2022-02-04

## 2022-01-28 RX ORDER — INSULIN LISPRO 100/ML
VIAL (ML) SUBCUTANEOUS
Refills: 0 | Status: DISCONTINUED | OUTPATIENT
Start: 2022-01-28 | End: 2022-02-04

## 2022-01-28 RX ORDER — ONDANSETRON 8 MG/1
4 TABLET, FILM COATED ORAL
Refills: 0 | Status: DISCONTINUED | OUTPATIENT
Start: 2022-01-28 | End: 2022-02-04

## 2022-01-28 RX ORDER — ACETAMINOPHEN 500 MG
1000 TABLET ORAL ONCE
Refills: 0 | Status: COMPLETED | OUTPATIENT
Start: 2022-01-28 | End: 2022-01-28

## 2022-01-28 RX ORDER — MORPHINE SULFATE 50 MG/1
6 CAPSULE, EXTENDED RELEASE ORAL ONCE
Refills: 0 | Status: DISCONTINUED | OUTPATIENT
Start: 2022-01-28 | End: 2022-01-28

## 2022-01-28 RX ORDER — BETHANECHOL CHLORIDE 25 MG
25 TABLET ORAL THREE TIMES A DAY
Refills: 0 | Status: DISCONTINUED | OUTPATIENT
Start: 2022-01-28 | End: 2022-02-04

## 2022-01-28 RX ORDER — METOPROLOL TARTRATE 50 MG
50 TABLET ORAL DAILY
Refills: 0 | Status: DISCONTINUED | OUTPATIENT
Start: 2022-01-28 | End: 2022-01-29

## 2022-01-28 RX ORDER — CHOLESTYRAMINE 4 G/9G
4 POWDER, FOR SUSPENSION ORAL DAILY
Refills: 0 | Status: DISCONTINUED | OUTPATIENT
Start: 2022-01-28 | End: 2022-02-03

## 2022-01-28 RX ORDER — ACETAMINOPHEN 500 MG
650 TABLET ORAL EVERY 6 HOURS
Refills: 0 | Status: DISCONTINUED | OUTPATIENT
Start: 2022-01-28 | End: 2022-02-04

## 2022-01-28 RX ORDER — VANCOMYCIN HCL 1 G
1500 VIAL (EA) INTRAVENOUS ONCE
Refills: 0 | Status: COMPLETED | OUTPATIENT
Start: 2022-01-28 | End: 2022-01-28

## 2022-01-28 RX ORDER — INSULIN LISPRO 100/ML
VIAL (ML) SUBCUTANEOUS AT BEDTIME
Refills: 0 | Status: DISCONTINUED | OUTPATIENT
Start: 2022-01-28 | End: 2022-02-04

## 2022-01-28 RX ORDER — FAMOTIDINE 10 MG/ML
20 INJECTION INTRAVENOUS DAILY
Refills: 0 | Status: DISCONTINUED | OUTPATIENT
Start: 2022-01-28 | End: 2022-02-04

## 2022-01-28 RX ORDER — VANCOMYCIN HCL 1 G
125 VIAL (EA) INTRAVENOUS EVERY 6 HOURS
Refills: 0 | Status: DISCONTINUED | OUTPATIENT
Start: 2022-01-28 | End: 2022-01-30

## 2022-01-28 RX ADMIN — Medication 1000 MILLIGRAM(S): at 17:27

## 2022-01-28 RX ADMIN — Medication 25 MILLIGRAM(S): at 22:21

## 2022-01-28 RX ADMIN — MORPHINE SULFATE 2 MILLIGRAM(S): 50 CAPSULE, EXTENDED RELEASE ORAL at 22:35

## 2022-01-28 RX ADMIN — MORPHINE SULFATE 2 MILLIGRAM(S): 50 CAPSULE, EXTENDED RELEASE ORAL at 22:20

## 2022-01-28 RX ADMIN — PIPERACILLIN AND TAZOBACTAM 200 GRAM(S): 4; .5 INJECTION, POWDER, LYOPHILIZED, FOR SOLUTION INTRAVENOUS at 17:15

## 2022-01-28 RX ADMIN — SODIUM CHLORIDE 2500 MILLILITER(S): 9 INJECTION INTRAMUSCULAR; INTRAVENOUS; SUBCUTANEOUS at 16:55

## 2022-01-28 RX ADMIN — PIPERACILLIN AND TAZOBACTAM 3.38 GRAM(S): 4; .5 INJECTION, POWDER, LYOPHILIZED, FOR SOLUTION INTRAVENOUS at 17:45

## 2022-01-28 RX ADMIN — INSULIN GLARGINE 10 UNIT(S): 100 INJECTION, SOLUTION SUBCUTANEOUS at 22:20

## 2022-01-28 RX ADMIN — SODIUM CHLORIDE 2500 MILLILITER(S): 9 INJECTION INTRAMUSCULAR; INTRAVENOUS; SUBCUTANEOUS at 17:55

## 2022-01-28 RX ADMIN — MORPHINE SULFATE 6 MILLIGRAM(S): 50 CAPSULE, EXTENDED RELEASE ORAL at 18:27

## 2022-01-28 RX ADMIN — ONDANSETRON 4 MILLIGRAM(S): 8 TABLET, FILM COATED ORAL at 17:15

## 2022-01-28 RX ADMIN — Medication 300 MILLIGRAM(S): at 22:20

## 2022-01-28 RX ADMIN — Medication 400 MILLIGRAM(S): at 16:54

## 2022-01-28 RX ADMIN — TAMSULOSIN HYDROCHLORIDE 0.4 MILLIGRAM(S): 0.4 CAPSULE ORAL at 22:21

## 2022-01-28 RX ADMIN — ATORVASTATIN CALCIUM 40 MILLIGRAM(S): 80 TABLET, FILM COATED ORAL at 22:21

## 2022-01-28 RX ADMIN — MORPHINE SULFATE 6 MILLIGRAM(S): 50 CAPSULE, EXTENDED RELEASE ORAL at 19:05

## 2022-01-28 NOTE — H&P ADULT - ASSESSMENT
SARAH MORRISON is a 52 YO Male transferred from [facility] to the ED because of abdominal pain and fever. Patient states around 6 am he began to experience sharp b/l upper abdominal pain. He has had multiple episodes of non bloody emesis and diarrhea.

## 2022-01-28 NOTE — ED PROVIDER NOTE - OBJECTIVE STATEMENT
51 yo M with PMHx of CAD s/p (stent), RUL subsegmental PE (on Eliquis), PAF, s/p ex-lap for perforated antral ulcer (5/2019),  CVA s/p tPA (8/2019), HTN and DM2, osteomyelitis, s/p debridement, s/p left TMA 5/2020, 51 yo M with PMHx of CAD s/p (stent), RUL subsegmental PE (on Eliquis), PAF, s/p ex-lap for perforated antral ulcer (5/2019),  CVA, HTN and DM2, osteomyelitis, s/p left TMA 5/2020 presents to the ED w/ abdominal pain, fever. Patient states around 6 am he began to experience sharp b/l upper abdominal pain. He has had multiple episodes of NBNB emesis and diarrhea. Denies melena or BRBPR. Patient states yesterday was tolerating PO food/liquids, has been unable to eat today due to pain, Patient has chronic indwelling jacques, states he was tx for UTI last week, completed antibiotics on thursday. Pt has fever in ED, denies cough, URI symptoms. Denies HA, dizziness, chest pain, sob, rash. Denies sick contacts or recent travel.

## 2022-01-28 NOTE — ED PROVIDER NOTE - ATTENDING CONTRIBUTION TO CARE
Patient seen with ACP, substantiative portion of visit including medical decision making done by myself in coordination with ACP. In Brief: 50 M from [facility] for nausea, vomiting, diarrhea, fevers, recent antibiotics for UTI. On exam, patient well appearing, no acute distress, tachycardic, lungs clear to auscultation anteriorly, abdomen soft, with diffuse tenderness to palpation. check labs, stool for c diff, cultures, urine, CT a/p, admit.

## 2022-01-28 NOTE — ED PROVIDER NOTE - ENMT NEGATIVE STATEMENT, MLM
Ears: no ear pain Nose: no nasal congestion and no nasal drainage. Mouth/Throat: no throat pain. Neck: no lumps, no pain, no stiffness and no swollen glands.

## 2022-01-28 NOTE — H&P ADULT - HISTORY OF PRESENT ILLNESS
Chart and labs reviewed.  · Chief Complaint: The patient is a 51y Male complaining of abdominal pain.  · HPI Objective Statement: 49 yo M with PMHx of CAD s/p (stent), RUL subsegmental PE (on Eliquis), PAF, s/p ex-lap for perforated antral ulcer (5/2019),  CVA, HTN and DM2, osteomyelitis, s/p left TMA 5/2020 presents to the ED w/ abdominal pain, fever. Patient states around 6 am he began to experience sharp b/l upper abdominal pain. He has had multiple episodes of NBNB emesis and diarrhea. Denies melena or BRBPR. Patient states yesterday was tolerating PO food/liquids, has been unable to eat today due to pain, Patient has chronic indwelling jacques, states he was tx for UTI last week, completed antibiotics on thursday. Pt has fever in ED, denies cough, URI symptoms. Denies HA, dizziness, chest pain, sob, rash. Denies sick contacts or recent travel.   Chart and labs reviewed.  SARAH MORRISON is a 50 YO Male transferred from [facility] to the ED because of abdominal pain and fever. Patient states around 6 am he began to experience sharp b/l upper abdominal pain. He has had multiple episodes of non bloody emesis and diarrhea. Denies melena or bright red blood. Patient states yesterday he was tolerating PO food liquids, now he has been unable to eat today due to pain, Patient has chronic indwelling Aguilera catheter states that he was treated for UTI last week, completed antibiotics on Thursday.  Patient has fever in the ED, denies cough, URI symptoms. Denies HA, dizziness, chest pain, sob, rash. Denies sick contacts or recent travel.  Patient with PMH of CAD s/p (stent), RUL subsegmental PE (on Eliquis), PAF, s/p ex-lap for perforated antral ulcer (5/2019),  CVA, HTN and DM2, osteomyelitis, s/p left TMA 5/2020.

## 2022-01-28 NOTE — H&P ADULT - NSHPLABSRESULTS_GEN_ALL_CORE
16.4   9.53  )-----------( 214      ( 2022 16:44 )             48.7     2022 16:44    137    |  105    |  22     ----------------------------<  186    4.2     |  25     |  1.20     Ca    9.2        2022 16:44    TPro  8.4    /  Alb  3.8    /  TBili  0.5    /  DBili  x      /  AST  18     /  ALT  26     /  AlkPhos  134    2022 16:44    LIVER FUNCTIONS - ( 2022 16:44 )  Alb: 3.8 g/dL / Pro: 8.4 g/dL / ALK PHOS: 134 U/L / ALT: 26 U/L / AST: 18 U/L / GGT: x           CAPILLARY BLOOD GLUCOSE    POCT Blood Glucose.: 164 mg/dL (2022 22:18)    Urinalysis Basic - ( 2022 16:44 )    Color: Yellow / Appearance: Slightly Turbid / S.015 / pH: x  Gluc: x / Ketone: Trace  / Bili: Small / Urobili: Negative   Blood: x / Protein: 30 mg/dL / Nitrite: Negative   Leuk Esterase: Moderate / RBC: 11-25 /HPF / WBC 11-25   Sq Epi: x / Non Sq Epi: Occasional / Bacteria: Moderate

## 2022-01-28 NOTE — ED PROVIDER NOTE - CARE PLAN
Principal Discharge DX:	Pancolitis  Secondary Diagnosis:	Acute UTI  Secondary Diagnosis:	Diarrhea   1

## 2022-01-28 NOTE — ED ADULT NURSE NOTE - NSIMPLEMENTINTERV_GEN_ALL_ED
Implemented All Fall with Harm Risk Interventions:  [place] to call system. Call bell, personal items and telephone within reach. Instruct patient to call for assistance. Room bathroom lighting operational. Non-slip footwear when patient is off stretcher. Physically safe environment: no spills, clutter or unnecessary equipment. Stretcher in lowest position, wheels locked, appropriate side rails in place. Provide visual cue, wrist band, yellow gown, etc. Monitor gait and stability. Monitor for mental status changes and reorient to person, place, and time. Review medications for side effects contributing to fall risk. Reinforce activity limits and safety measures with patient and family. Provide visual clues: red socks.

## 2022-01-28 NOTE — ED ADULT NURSE NOTE - OBJECTIVE STATEMENT
BIBA from [facility] c/o Abdominal pain, nausea & vomiting, rectal temp 101 on arrival pt is nonambulatory, no digits left foot, large abdominal hernia noted on assessment, tender to palpitation, distended, positive bowel sounds noted, pt was covered in liquidy stool on arrival

## 2022-01-28 NOTE — ED PROVIDER NOTE - CLINICAL SUMMARY MEDICAL DECISION MAKING FREE TEXT BOX
49 yo M with PMHx of CAD s/p (stent), RUL subsegmental PE (on Eliquis), PAF, s/p ex-lap for perforated antral ulcer (5/2019),  CVA, HTN and DM2, osteomyelitis, s/p left TMA 5/2020 presents to the ED w/ abdominal pain, fever.    Patient febrile, tachycardic upon arrival to ED, sepsis w/u initiated   Plan for labs, UA, IVF, analgesics, CT abd/pelvis, stool culture/c.diff

## 2022-01-29 DIAGNOSIS — K52.9 NONINFECTIVE GASTROENTERITIS AND COLITIS, UNSPECIFIED: ICD-10-CM

## 2022-01-29 DIAGNOSIS — R10.9 UNSPECIFIED ABDOMINAL PAIN: ICD-10-CM

## 2022-01-29 DIAGNOSIS — K51.00 ULCERATIVE (CHRONIC) PANCOLITIS WITHOUT COMPLICATIONS: ICD-10-CM

## 2022-01-29 LAB
C DIFF BY PCR RESULT: SIGNIFICANT CHANGE UP
C DIFF TOX GENS STL QL NAA+PROBE: SIGNIFICANT CHANGE UP

## 2022-01-29 RX ORDER — METOPROLOL TARTRATE 50 MG
50 TABLET ORAL DAILY
Refills: 0 | Status: DISCONTINUED | OUTPATIENT
Start: 2022-01-29 | End: 2022-02-04

## 2022-01-29 RX ORDER — ISOSORBIDE MONONITRATE 60 MG/1
30 TABLET, EXTENDED RELEASE ORAL DAILY
Refills: 0 | Status: DISCONTINUED | OUTPATIENT
Start: 2022-01-29 | End: 2022-02-04

## 2022-01-29 RX ORDER — LOSARTAN POTASSIUM 100 MG/1
25 TABLET, FILM COATED ORAL DAILY
Refills: 0 | Status: DISCONTINUED | OUTPATIENT
Start: 2022-01-29 | End: 2022-02-04

## 2022-01-29 RX ORDER — LACTOBACILLUS ACIDOPHILUS 100MM CELL
1 CAPSULE ORAL THREE TIMES A DAY
Refills: 0 | Status: DISCONTINUED | OUTPATIENT
Start: 2022-01-29 | End: 2022-02-04

## 2022-01-29 RX ADMIN — Medication 1 TABLET(S): at 15:23

## 2022-01-29 RX ADMIN — Medication 1 TABLET(S): at 22:31

## 2022-01-29 RX ADMIN — TAMSULOSIN HYDROCHLORIDE 0.4 MILLIGRAM(S): 0.4 CAPSULE ORAL at 22:20

## 2022-01-29 RX ADMIN — MORPHINE SULFATE 2 MILLIGRAM(S): 50 CAPSULE, EXTENDED RELEASE ORAL at 19:21

## 2022-01-29 RX ADMIN — MORPHINE SULFATE 2 MILLIGRAM(S): 50 CAPSULE, EXTENDED RELEASE ORAL at 19:45

## 2022-01-29 RX ADMIN — Medication 50 MILLIGRAM(S): at 06:46

## 2022-01-29 RX ADMIN — MORPHINE SULFATE 2 MILLIGRAM(S): 50 CAPSULE, EXTENDED RELEASE ORAL at 19:27

## 2022-01-29 RX ADMIN — Medication 1 TABLET(S): at 12:22

## 2022-01-29 RX ADMIN — GABAPENTIN 100 MILLIGRAM(S): 400 CAPSULE ORAL at 12:51

## 2022-01-29 RX ADMIN — INSULIN GLARGINE 10 UNIT(S): 100 INJECTION, SOLUTION SUBCUTANEOUS at 09:53

## 2022-01-29 RX ADMIN — Medication 125 MILLIGRAM(S): at 00:59

## 2022-01-29 RX ADMIN — Medication 125 MILLIGRAM(S): at 12:22

## 2022-01-29 RX ADMIN — APIXABAN 5 MILLIGRAM(S): 2.5 TABLET, FILM COATED ORAL at 06:46

## 2022-01-29 RX ADMIN — MORPHINE SULFATE 2 MILLIGRAM(S): 50 CAPSULE, EXTENDED RELEASE ORAL at 07:00

## 2022-01-29 RX ADMIN — Medication 25 MILLIGRAM(S): at 15:23

## 2022-01-29 RX ADMIN — Medication 125 MILLIGRAM(S): at 06:46

## 2022-01-29 RX ADMIN — Medication 25 MILLIGRAM(S): at 06:46

## 2022-01-29 RX ADMIN — INSULIN GLARGINE 10 UNIT(S): 100 INJECTION, SOLUTION SUBCUTANEOUS at 21:22

## 2022-01-29 RX ADMIN — MORPHINE SULFATE 2 MILLIGRAM(S): 50 CAPSULE, EXTENDED RELEASE ORAL at 06:46

## 2022-01-29 RX ADMIN — APIXABAN 5 MILLIGRAM(S): 2.5 TABLET, FILM COATED ORAL at 17:28

## 2022-01-29 RX ADMIN — Medication 81 MILLIGRAM(S): at 12:22

## 2022-01-29 RX ADMIN — ISOSORBIDE MONONITRATE 30 MILLIGRAM(S): 60 TABLET, EXTENDED RELEASE ORAL at 12:22

## 2022-01-29 RX ADMIN — MORPHINE SULFATE 2 MILLIGRAM(S): 50 CAPSULE, EXTENDED RELEASE ORAL at 12:52

## 2022-01-29 RX ADMIN — Medication 1: at 09:54

## 2022-01-29 RX ADMIN — Medication 25 MILLIGRAM(S): at 22:20

## 2022-01-29 RX ADMIN — LOSARTAN POTASSIUM 25 MILLIGRAM(S): 100 TABLET, FILM COATED ORAL at 06:46

## 2022-01-29 RX ADMIN — FAMOTIDINE 20 MILLIGRAM(S): 10 INJECTION INTRAVENOUS at 12:51

## 2022-01-29 RX ADMIN — Medication 125 MILLIGRAM(S): at 17:28

## 2022-01-29 RX ADMIN — ONDANSETRON 4 MILLIGRAM(S): 8 TABLET, FILM COATED ORAL at 06:46

## 2022-01-29 RX ADMIN — ONDANSETRON 4 MILLIGRAM(S): 8 TABLET, FILM COATED ORAL at 19:27

## 2022-01-29 RX ADMIN — ATORVASTATIN CALCIUM 40 MILLIGRAM(S): 80 TABLET, FILM COATED ORAL at 22:20

## 2022-01-29 RX ADMIN — CHOLESTYRAMINE 4 GRAM(S): 4 POWDER, FOR SUSPENSION ORAL at 09:53

## 2022-01-29 NOTE — ED ADULT NURSE REASSESSMENT NOTE - NSIMPLEMENTINTERV_GEN_ALL_ED
Implemented All Fall Risk Interventions:  [place] to call system. Call bell, personal items and telephone within reach. Instruct patient to call for assistance. Room bathroom lighting operational. Non-slip footwear when patient is off stretcher. Physically safe environment: no spills, clutter or unnecessary equipment. Stretcher in lowest position, wheels locked, appropriate side rails in place. Provide visual cue, wrist band, yellow gown, etc. Monitor gait and stability. Monitor for mental status changes and reorient to person, place, and time. Review medications for side effects contributing to fall risk. Reinforce activity limits and safety measures with patient and family.
Implemented All Fall Risk Interventions:  [place] to call system. Call bell, personal items and telephone within reach. Instruct patient to call for assistance. Room bathroom lighting operational. Non-slip footwear when patient is off stretcher. Physically safe environment: no spills, clutter or unnecessary equipment. Stretcher in lowest position, wheels locked, appropriate side rails in place. Provide visual cue, wrist band, yellow gown, etc. Monitor gait and stability. Monitor for mental status changes and reorient to person, place, and time. Review medications for side effects contributing to fall risk. Reinforce activity limits and safety measures with patient and family.

## 2022-01-29 NOTE — ED ADULT NURSE REASSESSMENT NOTE - NSINTERVENTIONOPT_GEN_ALL_ED
Hourly Rounding/Enhanced Supervision/Move Toilet (commode/urinal) to patient using "arms reach"
Hourly Rounding/Enhanced Supervision/Move Toilet (commode/urinal) to patient using "arms reach"

## 2022-01-29 NOTE — PATIENT PROFILE ADULT - FALL HARM RISK - RISK INTERVENTIONS

## 2022-01-29 NOTE — CONSULT NOTE ADULT - ASSESSMENT
50 yo with pancolitis   cont abx    check for c dif
diarrhea  r/o cdiff        Diarrhea   send GI PCR and C. diff   on probiotic   monitor frequency/quality   clears   on PO vanco  will follow    Advanced care planning was discussed with patient and family.  Advanced care planning forms were reviewed and discussed.  Risks, benefits and alternatives of gastroenterologic procedures were discussed in detail and all questions were answered.    30 minutes spent.

## 2022-01-29 NOTE — ED ADULT NURSE REASSESSMENT NOTE - NS ED NURSE REASSESS COMMENT FT1
pt had liquid BM x3 seen by GI no additional orders patient medicated for abd pain jacques draining madalyn urine 1000ml taking small amounts of clear liquid diet pt using bedside commode
pt slept well most of the night.  no BM noted after midnight.  VSS afebrile.  pt requested nausea and pain medication this morning,  given with relief.  no incidents noted overnight.  will endorse to day shift RN.
Received pt from [facility].  VSS afebrile, c/o abd pain medicated with morphine.  pt reports relief after morphine was given.  HS FS 164mg/dl.  Lantus given as ordered.  not covered with sliding scale as per protocol.  jacques to bedside draining yellow urine.  +dry oral mucous membranes noted.  loose watery stool noted, unable to obtain specimen for stool collection.  IV and PO vanco given as ordered.  evaluated at bedside by Dr Wilson.  ADL's met by staff.  call bell within reach, bed in lowest position.
received report from RN Guille.  VSS afebrile, pt c/o abd pain and nausea.  medicated with morphine and zofran as ordered with relief.  OOB to bedside commode with assistance.  all stool specimens sent.  awaiting results and bed assignment.  call bell within reach, bed in lowest position, commode at bedside.

## 2022-01-29 NOTE — ED ADULT NURSE REASSESSMENT NOTE - NSFALLRSKINDICTYPE_ED_ALL_ED
Altered Elimination/Impaired Gait/Need for Mobility Assisted Device
Impaired Gait/Need for Mobility Assisted Device

## 2022-01-30 DIAGNOSIS — I26.99 OTHER PULMONARY EMBOLISM WITHOUT ACUTE COR PULMONALE: ICD-10-CM

## 2022-01-30 DIAGNOSIS — E05.90 THYROTOXICOSIS, UNSPECIFIED WITHOUT THYROTOXIC CRISIS OR STORM: ICD-10-CM

## 2022-01-30 DIAGNOSIS — I25.10 ATHEROSCLEROTIC HEART DISEASE OF NATIVE CORONARY ARTERY WITHOUT ANGINA PECTORIS: ICD-10-CM

## 2022-01-30 DIAGNOSIS — E11.65 TYPE 2 DIABETES MELLITUS WITH HYPERGLYCEMIA: ICD-10-CM

## 2022-01-30 LAB
CULTURE RESULTS: SIGNIFICANT CHANGE UP
SPECIMEN SOURCE: SIGNIFICANT CHANGE UP

## 2022-01-30 RX ORDER — METRONIDAZOLE 500 MG
TABLET ORAL
Refills: 0 | Status: DISCONTINUED | OUTPATIENT
Start: 2022-01-30 | End: 2022-01-30

## 2022-01-30 RX ORDER — CEFTRIAXONE 500 MG/1
1000 INJECTION, POWDER, FOR SOLUTION INTRAMUSCULAR; INTRAVENOUS EVERY 24 HOURS
Refills: 0 | Status: DISCONTINUED | OUTPATIENT
Start: 2022-01-30 | End: 2022-01-30

## 2022-01-30 RX ORDER — METRONIDAZOLE 500 MG
500 TABLET ORAL ONCE
Refills: 0 | Status: COMPLETED | OUTPATIENT
Start: 2022-01-30 | End: 2022-01-30

## 2022-01-30 RX ORDER — METRONIDAZOLE 500 MG
500 TABLET ORAL EVERY 8 HOURS
Refills: 0 | Status: DISCONTINUED | OUTPATIENT
Start: 2022-01-30 | End: 2022-01-30

## 2022-01-30 RX ORDER — SACCHAROMYCES BOULARDII 250 MG
250 POWDER IN PACKET (EA) ORAL
Refills: 0 | Status: DISCONTINUED | OUTPATIENT
Start: 2022-01-30 | End: 2022-02-04

## 2022-01-30 RX ADMIN — FAMOTIDINE 20 MILLIGRAM(S): 10 INJECTION INTRAVENOUS at 11:41

## 2022-01-30 RX ADMIN — TAMSULOSIN HYDROCHLORIDE 0.4 MILLIGRAM(S): 0.4 CAPSULE ORAL at 21:18

## 2022-01-30 RX ADMIN — CHOLESTYRAMINE 4 GRAM(S): 4 POWDER, FOR SUSPENSION ORAL at 08:22

## 2022-01-30 RX ADMIN — Medication 1: at 17:03

## 2022-01-30 RX ADMIN — INSULIN GLARGINE 10 UNIT(S): 100 INJECTION, SOLUTION SUBCUTANEOUS at 08:21

## 2022-01-30 RX ADMIN — Medication 250 MILLIGRAM(S): at 17:02

## 2022-01-30 RX ADMIN — Medication 125 MILLIGRAM(S): at 00:01

## 2022-01-30 RX ADMIN — INSULIN GLARGINE 10 UNIT(S): 100 INJECTION, SOLUTION SUBCUTANEOUS at 22:45

## 2022-01-30 RX ADMIN — Medication 81 MILLIGRAM(S): at 11:41

## 2022-01-30 RX ADMIN — Medication 1 TABLET(S): at 21:19

## 2022-01-30 RX ADMIN — GABAPENTIN 100 MILLIGRAM(S): 400 CAPSULE ORAL at 11:41

## 2022-01-30 RX ADMIN — Medication 125 MILLIGRAM(S): at 05:16

## 2022-01-30 RX ADMIN — Medication 100 MILLIGRAM(S): at 13:56

## 2022-01-30 RX ADMIN — Medication 1 TABLET(S): at 05:17

## 2022-01-30 RX ADMIN — ISOSORBIDE MONONITRATE 30 MILLIGRAM(S): 60 TABLET, EXTENDED RELEASE ORAL at 11:41

## 2022-01-30 RX ADMIN — Medication 1 TABLET(S): at 13:01

## 2022-01-30 RX ADMIN — ONDANSETRON 4 MILLIGRAM(S): 8 TABLET, FILM COATED ORAL at 20:08

## 2022-01-30 RX ADMIN — Medication 25 MILLIGRAM(S): at 05:17

## 2022-01-30 RX ADMIN — MORPHINE SULFATE 2 MILLIGRAM(S): 50 CAPSULE, EXTENDED RELEASE ORAL at 11:55

## 2022-01-30 RX ADMIN — MORPHINE SULFATE 2 MILLIGRAM(S): 50 CAPSULE, EXTENDED RELEASE ORAL at 05:16

## 2022-01-30 RX ADMIN — APIXABAN 5 MILLIGRAM(S): 2.5 TABLET, FILM COATED ORAL at 17:02

## 2022-01-30 RX ADMIN — ATORVASTATIN CALCIUM 40 MILLIGRAM(S): 80 TABLET, FILM COATED ORAL at 21:18

## 2022-01-30 RX ADMIN — Medication 25 MILLIGRAM(S): at 13:01

## 2022-01-30 RX ADMIN — APIXABAN 5 MILLIGRAM(S): 2.5 TABLET, FILM COATED ORAL at 05:16

## 2022-01-30 RX ADMIN — Medication 25 MILLIGRAM(S): at 21:19

## 2022-01-30 RX ADMIN — MORPHINE SULFATE 2 MILLIGRAM(S): 50 CAPSULE, EXTENDED RELEASE ORAL at 20:08

## 2022-01-30 RX ADMIN — MORPHINE SULFATE 2 MILLIGRAM(S): 50 CAPSULE, EXTENDED RELEASE ORAL at 11:40

## 2022-01-30 RX ADMIN — Medication 1 TABLET(S): at 11:41

## 2022-01-30 RX ADMIN — LOSARTAN POTASSIUM 25 MILLIGRAM(S): 100 TABLET, FILM COATED ORAL at 05:17

## 2022-01-30 RX ADMIN — MORPHINE SULFATE 2 MILLIGRAM(S): 50 CAPSULE, EXTENDED RELEASE ORAL at 05:31

## 2022-01-30 RX ADMIN — Medication 50 MILLIGRAM(S): at 05:17

## 2022-01-30 NOTE — PHARMACOTHERAPY INTERVENTION NOTE - COMMENTS
cdif pcr resulted negative.  Discussed with Dr. D Perlman.  Discontinued oral vancomycin per telephone order.

## 2022-01-30 NOTE — CONSULT NOTE ADULT - PROBLEM/RECOMMENDATION-2
CRYOSURGERY      Your doctor has used a method called cryosurgery to treat your skin condition. Cryosurgery refers to the use of very cold substances to treat a variety of skin conditions such as warts, pre-skin cancers, molluscum contagiosum, sun spots, and several benign growths. The substance we use in cryosurgery is liquid nitrogen and is so cold (-195 degrees Celsius) that is burns when administered.     Following treatment in the office, the skin may immediately burn and become red. You may find the area around the lesion is affected as well. It is sometimes necessary to treat not only the lesion, but a small area of the surrounding normal skin to achieve a good response.     A blister, and even a blood filled blister, may form after treatment.   This is a normal response. If the blister is painful, it is acceptable to sterilize a needle and with rubbing alcohol and gently pop the blister. It is important that you gently wash the area with soap and warm water as the blister fluid may contain wart virus if a wart was treated. Do no remove the roof of the blister.     The area treated can take anywhere from 1-3 weeks to heal. Healing time depends on the kind skin lesion treated, the location, and how aggressively the lesion was treated. It is recommended that the areas treated are covered with Vaseline or bacitracin ointment and a band-aid. If a band-aid is not practical, just ointment applied several times per day will do. Keeping these areas moist will speed the healing time.    Treatment with liquid nitrogen can leave a scar. In dark skin, it may be a light or dark scar, in light skin it may be a white or pink scar. These will generally fade with time, but may never go away completely.     If you have any concerns after your treatment, please feel free to call the office.          DISPLAY PLAN FREE TEXT

## 2022-01-30 NOTE — PROVIDER CONTACT NOTE (CRITICAL VALUE NOTIFICATION) - ACTION/TREATMENT ORDERED:
Dr. Perlman covering Dr. Brizuela. Dr. Perlman made aware. Patient on contact precautions. No new orders at this time.

## 2022-01-30 NOTE — PHYSICAL THERAPY INITIAL EVALUATION ADULT - ADDITIONAL COMMENTS
[facility] Patient reports being independent prior to admission and amb with RW.

## 2022-01-30 NOTE — PROGRESS NOTE ADULT - ASSESSMENT
diarrhea  r/o cdiff        Diarrhea   Cdiff neg now off vanco started on flagyl  GI PCR pending f/u results   on probiotic   monitor frequency/quality   clears   replete elytes prn   will follow    Advanced care planning was discussed with patient and family.  Advanced care planning forms were reviewed and discussed.  Risks, benefits and alternatives of gastroenterologic procedures were discussed in detail and all questions were answered.    30 minutes spent.      diarrhea  r/o cdiff        Diarrhea   Cdiff neg now off vanco   GI PCR pending f/u results   on probiotic   on questran   monitor frequency/quality   clears   replete elytes prn   will follow    Advanced care planning was discussed with patient and family.  Advanced care planning forms were reviewed and discussed.  Risks, benefits and alternatives of gastroenterologic procedures were discussed in detail and all questions were answered.    30 minutes spent.

## 2022-01-30 NOTE — CONSULT NOTE ADULT - PROBLEM SELECTOR RECOMMENDATION 9
cont lantus 10 units 2x/day  cont admelog corrective scale coverage qac/qhs  cont cons cho diet  goal bg 100-180 in hosp setting

## 2022-01-30 NOTE — CONSULT NOTE ADULT - SUBJECTIVE AND OBJECTIVE BOX
Patient is a 51y old  Male who presents with a chief complaint of diarrhea (30 Jan 2022 12:29)      Reason For Consult: dm2 uncontrolled  hyperthyroidism    HPI:  Chart and labs reviewed.  · Chief Complaint: The patient is a 51y Male complaining of abdominal pain.  · HPI Objective Statement: 51 yo M with PMHx of CAD s/p (stent), RUL subsegmental PE (on Eliquis), PAF, s/p ex-lap for perforated antral ulcer (5/2019),  CVA, HTN and DM2, osteomyelitis, s/p left TMA 5/2020 presents to the ED w/ abdominal pain, fever. Patient states around 6 am he began to experience sharp b/l upper abdominal pain. He has had multiple episodes of NBNB emesis and diarrhea. Denies melena or BRBPR. Patient states yesterday was tolerating PO food/liquids, has been unable to eat today due to pain, Patient has chronic indwelling jacques, states he was tx for UTI last week, completed antibiotics on thursday. Pt has fever in ED, denies cough, URI symptoms. Denies HA, dizziness, chest pain, sob, rash. Denies sick contacts or recent travel.   (28 Jan 2022 22:45)      PAST MEDICAL & SURGICAL HISTORY:  Diabetes    Diabetes mellitus with no complication    Afib    Hypertension    BPH (benign prostatic hyperplasia)    Perforated gastric ulcer  s/p emergent ex-lap omentopexy and plication 6/2019    Pulmonary embolism    History of non-ST elevation myocardial infarction (NSTEMI)    Osteomyelitis  s/p debridement    CAD S/P percutaneous coronary angioplasty    Cerebrovascular accident    H/O abdominal surgery    Perforated gastric ulcer    Traumatic amputation of left foot, initial encounter        FAMILY HISTORY:  FH: pulmonary embolism  Mother    FH: coronary artery disease  Father    FH: stroke  Father          Social History:    MEDICATIONS  (STANDING):  apixaban 5 milliGRAM(s) Oral two times a day  aspirin  chewable 81 milliGRAM(s) Oral daily  atorvastatin 40 milliGRAM(s) Oral at bedtime  bethanechol 25 milliGRAM(s) Oral three times a day  cefTRIAXone   IVPB 1000 milliGRAM(s) IV Intermittent every 24 hours  cholestyramine Powder (Sugar-Free) 4 Gram(s) Oral daily  dextrose 40% Gel 15 Gram(s) Oral once  dextrose 50% Injectable 25 Gram(s) IV Push once  dextrose 50% Injectable 12.5 Gram(s) IV Push once  dextrose 50% Injectable 25 Gram(s) IV Push once  famotidine    Tablet 20 milliGRAM(s) Oral daily  gabapentin 100 milliGRAM(s) Oral daily  glucagon  Injectable 1 milliGRAM(s) IntraMuscular once  insulin glargine Injectable (LANTUS) 10 Unit(s) SubCutaneous two times a day  insulin lispro (ADMELOG) corrective regimen sliding scale   SubCutaneous three times a day before meals  insulin lispro (ADMELOG) corrective regimen sliding scale   SubCutaneous at bedtime  isosorbide   mononitrate ER Tablet (IMDUR) 30 milliGRAM(s) Oral daily  lactobacillus acidophilus 1 Tablet(s) Oral three times a day  losartan 25 milliGRAM(s) Oral daily  methimazole 10 milliGRAM(s) Oral daily  metoprolol succinate ER 50 milliGRAM(s) Oral daily  metroNIDAZOLE  IVPB      metroNIDAZOLE  IVPB 500 milliGRAM(s) IV Intermittent every 8 hours  multivitamin 1 Tablet(s) Oral daily  saccharomyces boulardii 250 milliGRAM(s) Oral two times a day  tamsulosin 0.4 milliGRAM(s) Oral at bedtime    MEDICATIONS  (PRN):  acetaminophen     Tablet .. 650 milliGRAM(s) Oral every 6 hours PRN Temp greater or equal to 38C (100.4F)  morphine  - Injectable 2 milliGRAM(s) IV Push every 6 hours PRN Severe Pain (7 - 10)  ondansetron Injectable 4 milliGRAM(s) IV Push four times a day PRN Nausea and/or Vomiting        T(C): 36.8 (01-30-22 @ 05:06), Max: 37.9 (01-29-22 @ 17:32)  HR: 75 (01-30-22 @ 13:05) (75 - 91)  BP: 121/75 (01-30-22 @ 13:05) (121/75 - 171/82)  RR: 16 (01-30-22 @ 05:06) (15 - 16)  SpO2: 95% (01-30-22 @ 13:05) (93% - 98%)  Wt(kg): --    PHYSICAL EXAM:  CHEST/LUNG: Clear to percussion bilaterally; No rales, rhonchi, wheezing, or rubs  HEART: Regular rate and rhythm; No murmurs, rubs, or gallops  ABDOMEN: Soft, Nontender, Nondistended; Bowel sounds present  EXTREMITIES:  2+ Peripheral Pulses, No clubbing, cyanosis, or edema  SKIN: No rashes or lesions    CAPILLARY BLOOD GLUCOSE      POCT Blood Glucose.: 126 mg/dL (30 Jan 2022 11:59)  POCT Blood Glucose.: 121 mg/dL (30 Jan 2022 07:47)  POCT Blood Glucose.: 165 mg/dL (29 Jan 2022 21:06)  POCT Blood Glucose.: 144 mg/dL (29 Jan 2022 17:24)                            16.4   9.53  )-----------( 214      ( 28 Jan 2022 16:44 )             48.7       CMP:  01-28 @ 16:44  SGPT 26  Albumin 3.8   Alk Phos 134   Anion Gap 7   SGOT 18   Total Bili 0.5   BUN 22   Calcium Total 9.2   CO2 25   Chloride 105   Creatinine 1.20   eGFR if AA 80   eGFR if non AA 69   Glucose 186   Potassium 4.2   Protein 8.4   Sodium 137      Thyroid Function Tests:      Diabetes Tests:       Radiology:               
· HPI Objective Statement: 51 yo M with PMHx of CAD s/p (stent), RUL subsegmental PE (on Eliquis), PAF, s/p ex-lap for perforated antral ulcer (5/2019),  CVA, HTN and DM2, osteomyelitis, s/p left TMA 5/2020 presents to the ED w/ abdominal pain, fever. Patient states around 6 am he began to experience sharp b/l upper abdominal pain. He has had multiple episodes of NBNB emesis and diarrhea. Denies melena or BRBPR. Patient states yesterday was tolerating PO food/liquids, has been unable to eat today due to pain, Patient has chronic indwelling jacques, states he was tx for UTI last week, completed antibiotics on thursday. Pt has fever in ED, denies cough, URI symptoms. Denies HA, dizziness, chest pain, sob, rash. Denies sick contacts or recent travel.      ROS-as above    PAST MEDICAL & SURGICAL HISTORY:  Diabetes    Diabetes mellitus with no complication    Afib    Hypertension    BPH (benign prostatic hyperplasia)    Perforated gastric ulcer  s/p emergent ex-lap omentopexy and plication 6/2019    Pulmonary embolism    History of non-ST elevation myocardial infarction (NSTEMI)    Osteomyelitis  s/p debridement    CAD S/P percutaneous coronary angioplasty    Cerebrovascular accident    H/O abdominal surgery    Perforated gastric ulcer    Traumatic amputation of left foot, initial encounter        MEDICATIONS  (STANDING):  apixaban 5 milliGRAM(s) Oral two times a day  aspirin  chewable 81 milliGRAM(s) Oral daily  atorvastatin 40 milliGRAM(s) Oral at bedtime  bethanechol 25 milliGRAM(s) Oral three times a day  cholestyramine Powder (Sugar-Free) 4 Gram(s) Oral daily  dextrose 40% Gel 15 Gram(s) Oral once  dextrose 50% Injectable 25 Gram(s) IV Push once  dextrose 50% Injectable 12.5 Gram(s) IV Push once  dextrose 50% Injectable 25 Gram(s) IV Push once  famotidine    Tablet 20 milliGRAM(s) Oral daily  gabapentin 100 milliGRAM(s) Oral daily  glucagon  Injectable 1 milliGRAM(s) IntraMuscular once  insulin glargine Injectable (LANTUS) 10 Unit(s) SubCutaneous two times a day  insulin lispro (ADMELOG) corrective regimen sliding scale   SubCutaneous three times a day before meals  insulin lispro (ADMELOG) corrective regimen sliding scale   SubCutaneous at bedtime  isosorbide   mononitrate ER Tablet (IMDUR) 30 milliGRAM(s) Oral daily  losartan 25 milliGRAM(s) Oral daily  methimazole 10 milliGRAM(s) Oral daily  metoprolol succinate ER 50 milliGRAM(s) Oral daily  multivitamin 1 Tablet(s) Oral daily  tamsulosin 0.4 milliGRAM(s) Oral at bedtime  vancomycin    Solution 125 milliGRAM(s) Oral every 6 hours    MEDICATIONS  (PRN):  acetaminophen     Tablet .. 650 milliGRAM(s) Oral every 6 hours PRN Temp greater or equal to 38C (100.4F)  morphine  - Injectable 2 milliGRAM(s) IV Push every 6 hours PRN Severe Pain (7 - 10)  ondansetron Injectable 4 milliGRAM(s) IV Push four times a day PRN Nausea and/or Vomiting      Allergies    fish (Hives)  No Known Drug Allergies    Intolerances        .  VITAL SIGNS:  T(C): 37.2 (01-28-22 @ 20:05), Max: 38.2 (01-28-22 @ 15:49)  T(F): 98.9 (01-28-22 @ 20:05), Max: 100.7 (01-28-22 @ 15:49)  HR: 97 (01-28-22 @ 20:05) (97 - 112)  BP: 115/65 (01-28-22 @ 20:05) (115/65 - 118/82)  BP(mean): --  RR: 18 (01-28-22 @ 20:05) (18 - 18)  SpO2: 98% (01-28-22 @ 20:05) (97% - 98%)  Wt(kg): --    PHYSICAL EXAM:    Constitutional:  NAD, resting comfortably in bed  Head: NC/AT  Eyes: PERRL b/l  ENT: MMM  Neck: supple; no JVD or thyromegaly  Respiratory: CTA B/L   Cardiac: +S1/S2; RRR   Gastrointestinal: soft, NT/ND;                           16.4   9.53  )-----------( 214      ( 28 Jan 2022 16:44 )             48.7   01-28    137  |  105  |  22  ----------------------------<  186<H>  4.2   |  25  |  1.20    Ca    9.2      28 Jan 2022 16:44    TPro  8.4<H>  /  Alb  3.8  /  TBili  0.5  /  DBili  x   /  AST  18  /  ALT  26  /  AlkPhos  134<H>  01-28      < from: CT Abdomen and Pelvis w/ IV Cont (01.28.22 @ 19:00) >  BOWEL: The fluid-filled stomach is unremarkable. Scattered colonic   diverticula. There is mild concentric wall thickening and mucosal   hyperenhancement of the fluid-filled colon. There is no pneumatosis or   stranding of the mesenteric fat. These findings are compatible with mild   pancolitis, likely infectious.  PERITONEUM: No ascites.  VESSELS: Atherosclerotic disease of the nonaneurysmal abdominal aorta.  RETROPERITONEUM/LYMPH NODES: No lymphadenopathy.  ABDOMINAL WALL: Bilobed fat-containing umbilical hernia.  BONES: Healing left lateralrib fractures.    IMPRESSION: Mild pancolitis, likely infectious.    < end of copied text >  
Covering [facility], Division of Infectious Diseases  DELGADO Geronimo, ADRIANNE Li, MATT Chatman    HPI:  49 yo M with PMHx of CAD s/p (stent), RUL subsegmental PE (on Eliquis), PAF, s/p ex-lap for perforated antral ulcer (5/2019),  CVA, HTN and DM2, osteomyelitis, s/p left TMA 5/2020 admitted with abdominal pain, fever with NBNB emesis and diarrhea. CT ap showed pancolitis. Cdiff neg GI PCR + Rotavirus.    Infectious Disease consult was called to evaluate pt.    Past Medical & Surgical Hx:  PAST MEDICAL & SURGICAL HISTORY:  Diabetes  Diabetes mellitus with no complication  Afib  Hypertension  BPH (benign prostatic hyperplasia)  Perforated gastric ulcer  s/p emergent ex-lap omentopexy and plication 6/2019  Pulmonary embolism  History of non-ST elevation myocardial infarction (NSTEMI)  Osteomyelitis  s/p debridement  CAD S/P percutaneous coronary angioplasty  Cerebrovascular accident  H/O abdominal surgery  Perforated gastric ulcer  Traumatic amputation of left foot, initial encounter      Social History--  EtOH: denies   Tobacco: denies   Drug Use: denies      FAMILY HISTORY:  FH: pulmonary embolism  Mother    FH: coronary artery disease  Father    FH: stroke  Father        Allergies  fish (Hives)  No Known Drug Allergies    Intolerances  NONE      Current Inpatient Medications :    ANTIBIOTICS:   cefTRIAXone   IVPB 1000 milliGRAM(s) IV Intermittent every 24 hours  metroNIDAZOLE  IVPB      metroNIDAZOLE  IVPB 500 milliGRAM(s) IV Intermittent every 8 hours      OTHER RELEVANT MEDICATIONS :  acetaminophen     Tablet .. 650 milliGRAM(s) Oral every 6 hours PRN  apixaban 5 milliGRAM(s) Oral two times a day  aspirin  chewable 81 milliGRAM(s) Oral daily  atorvastatin 40 milliGRAM(s) Oral at bedtime  bethanechol 25 milliGRAM(s) Oral three times a day  cholestyramine Powder (Sugar-Free) 4 Gram(s) Oral daily  dextrose 40% Gel 15 Gram(s) Oral once  dextrose 50% Injectable 25 Gram(s) IV Push once  dextrose 50% Injectable 12.5 Gram(s) IV Push once  dextrose 50% Injectable 25 Gram(s) IV Push once  famotidine    Tablet 20 milliGRAM(s) Oral daily  gabapentin 100 milliGRAM(s) Oral daily  glucagon  Injectable 1 milliGRAM(s) IntraMuscular once  insulin glargine Injectable (LANTUS) 10 Unit(s) SubCutaneous two times a day  insulin lispro (ADMELOG) corrective regimen sliding scale   SubCutaneous three times a day before meals  insulin lispro (ADMELOG) corrective regimen sliding scale   SubCutaneous at bedtime  isosorbide   mononitrate ER Tablet (IMDUR) 30 milliGRAM(s) Oral daily  losartan 25 milliGRAM(s) Oral daily  methimazole 10 milliGRAM(s) Oral daily  metoprolol succinate ER 50 milliGRAM(s) Oral daily  morphine  - Injectable 2 milliGRAM(s) IV Push every 6 hours PRN  multivitamin 1 Tablet(s) Oral daily  ondansetron Injectable 4 milliGRAM(s) IV Push four times a day PRN  tamsulosin 0.4 milliGRAM(s) Oral at bedtime      ROS:  CONSTITUTIONAL: +fever or chills  EYES:  Negative  blurry vision or double vision  CARDIOVASCULAR:  Negative for chest pain or palpitations  RESPIRATORY:  Negative for cough, wheezing, or SOB   GASTROINTESTINAL: + for nausea, vomiting, diarrhea, abdominal pain  GENITOURINARY:  Negative frequency, urgency , dysuria or hematuria   NEUROLOGIC:  No headache, confusion, dizziness, lightheadedness  All other systems were reviewed and are negative      Physical Exam:  Vital Signs Last 24 Hrs  T(C): 36.7 (30 Jan 2022 13:25), Max: 37.9 (29 Jan 2022 17:32)  T(F): 98 (30 Jan 2022 13:25), Max: 100.2 (29 Jan 2022 17:32)  HR: 62 (30 Jan 2022 13:25) (62 - 91)  BP: 106/67 (30 Jan 2022 13:25) (106/67 - 171/82)  RR: 17 (30 Jan 2022 13:25) (15 - 17)  SpO2: 95% (30 Jan 2022 13:25) (93% - 98%)      General:  no acute distress  Neck: supple, trachea midline  Lungs: clear, no wheeze/rhonchi  Cardiovascular: regular rate and rhythm, S1 S2  Abdomen: soft, mildly tender, ND, bowel sounds normal  Neurological:  alert and oriented x3  Skin: no rash  Extremities: trace edema      Labs:                         16.4   9.53  )-----------( 214      ( 28 Jan 2022 16:44 )             48.7   01-28    137  |  105  |  22  ----------------------------<  186<H>  4.2   |  25  |  1.20    Ca    9.2      28 Jan 2022 16:44    TPro  8.4<H>  /  Alb  3.8  /  TBili  0.5  /  DBili  x   /  AST  18  /  ALT  26  /  AlkPhos  134<H>  01-28      RECENT CULTURES:    GI PCR Panel, Stool (collected 30 Jan 2022 00:40)  Source: .Stool Feces  Final Report (30 Jan 2022 13:13):    Rotavirus A    DETECTED by PCR    *******Please Note:*******    GI panel PCR evaluates for:    Campylobacter, Plesiomonas shigelloides, Salmonella,    Vibrio, Yersinia enterocolitica, Enteroaggregative    Escherichia coli (EAEC), Enteropathogenic E.coli (EPEC),    Enterotoxigenic E. coli (ETEC) lt/st, Shiga-like    toxin-producing E. coli (STEC) stx1/stx2,    Shigella/ Enteroinvasive E. coli (EIEC), Cryptosporidium,    Cyclospora cayetanensis, Entamoeba histolytica,    Giardia lamblia, Adenovirus F 40/41, Astrovirus,    Norovirus GI/GII, Rotavirus A, Sapovirus    Culture - Blood (collected 28 Jan 2022 20:59)  Source: .Blood Blood-Peripheral  Preliminary Report (29 Jan 2022 21:01):    No growth to date.    Culture - Blood (collected 28 Jan 2022 20:59)  Source: .Blood Blood-Peripheral  Preliminary Report (29 Jan 2022 21:01):    No growth to date.      RADIOLOGY & ADDITIONAL STUDIES:    ACC: [id number] EXAM:  CT ABDOMEN AND PELVIS IC                          PROCEDURE DATE:  01/28/2022          INTERPRETATION:  CLINICAL INFORMATION: Abdominal pain. Fever.    COMPARISON: Noncontrast CT of the abdomen and pelvis dated October 21, 2019.    CONTRAST/COMPLICATIONS:  IV Contrast: NONE  90 cc administered   10 cc discarded  Oral Contrast: NONE  Complications: None reported at time of study completion    PROCEDURE:  CT of the Abdomen and Pelvis was performed.  Sagittal and coronal reformats were performed.    FINDINGS:  LOWER CHEST: Within normal limits.    LIVER: Within normal limits.  BILE DUCTS: Normal caliber.  GALLBLADDER: Within normal limits.  SPLEEN: Within normal limits.  PANCREAS: Mildly atrophic and fatty replaced.  ADRENALS: Within normal limits.  KIDNEYS/URETERS: Within normal limits.    BLADDER: Aguilera catheter in the collapsed bladder.  REPRODUCTIVE ORGANS: The uterus and adnexa are unremarkable by CT   criteria.    BOWEL: The fluid-filled stomach is unremarkable. Scattered colonic   diverticula. There is mild concentric wall thickening and mucosal   hyperenhancement of the fluid-filled colon. There is no pneumatosis or   stranding of the mesenteric fat. These findings are compatible with mild   pancolitis, likely infectious.  PERITONEUM: No ascites.  VESSELS: Atherosclerotic disease of the nonaneurysmal abdominal aorta.  RETROPERITONEUM/LYMPH NODES: No lymphadenopathy.  ABDOMINAL WALL: Bilobed fat-containing umbilical hernia.  BONES: Healing left lateralrib fractures.    IMPRESSION: Mild pancolitis, likely infectious.      Assessment :   49 yo M multiple medical problems admitted with fever abd pain n/v/d sec viral gastroenteritis GI PCR + Rotavirus.  Cdiff neg  Clinically better      Plan :   Dc Antibiotics and monitor off antibiotics  Trend temps and cbc  IVF  Serial abd exams  Contact isolation    Dr Jacobs to resume care in am    Continue with present regiment .  Approptiate use of antibiotics and adverse effects reviewed.      I have discussed the above plan of care with patient in detail. He expressed understanding of the treatment plan . Risks, benefits and alternatives discussed in detail. I have asked if he has any questions or concerns and appropriately addressed them to the best of my ability .      > 45 minutes spent in direct patient care reviewing  the notes, lab data/ imaging , discussion with multidisciplinary team. All questions were addressed and answered to the best of my capacity .    Thank you for allowing me to participate in the care of your patient .      Melody Armstrong MD  Infectious Disease  [phone]
[facility]  Tor Car PA-C  [address] [place] [address]  [phone]          HPI:  Chart and labs reviewed.  · Chief Complaint: The patient is a 51y Male complaining of abdominal pain.  · HPI Objective Statement: 49 yo M with PMHx of CAD s/p (stent), RUL subsegmental PE (on Eliquis), PAF, s/p ex-lap for perforated antral ulcer (2019),  CVA, HTN and DM2, osteomyelitis, s/p left TMA 2020 presents to the ED w/ abdominal pain, fever. Patient states around 6 am he began to experience sharp b/l upper abdominal pain. He has had multiple episodes of NBNB emesis and diarrhea. Denies melena or BRBPR. Patient states yesterday was tolerating PO food/liquids, has been unable to eat today due to pain, Patient has chronic indwelling jacques, states he was tx for UTI last week, completed antibiotics on thursday. Pt has fever in ED, denies cough, URI symptoms. Denies HA, dizziness, chest pain, sob, rash. Denies sick contacts or recent travel.   (2022 22:45)    GI consulted for r/o cdiff.  Pt states he began having abdominal pain/ nausea vomiting yesterday am w/ several bouts of diarrhea begining on Friday evening.  Today according to nursing staff he has had bm x 3 loose, non bloody. Pt states he was recently prescribed Abx for UTI but does not remember which one.  GI history includes perforated ulcer 2019 endorses taking protonix daily.  Never had colonoscopy does not follow with a GI dr.     Allergies:  fish (Hives)  No Known Drug Allergies      Medications:  acetaminophen     Tablet .. 650 milliGRAM(s) Oral every 6 hours PRN  apixaban 5 milliGRAM(s) Oral two times a day  aspirin  chewable 81 milliGRAM(s) Oral daily  atorvastatin 40 milliGRAM(s) Oral at bedtime  bethanechol 25 milliGRAM(s) Oral three times a day  cholestyramine Powder (Sugar-Free) 4 Gram(s) Oral daily  dextrose 40% Gel 15 Gram(s) Oral once  dextrose 50% Injectable 25 Gram(s) IV Push once  dextrose 50% Injectable 12.5 Gram(s) IV Push once  dextrose 50% Injectable 25 Gram(s) IV Push once  famotidine    Tablet 20 milliGRAM(s) Oral daily  gabapentin 100 milliGRAM(s) Oral daily  glucagon  Injectable 1 milliGRAM(s) IntraMuscular once  insulin glargine Injectable (LANTUS) 10 Unit(s) SubCutaneous two times a day  insulin lispro (ADMELOG) corrective regimen sliding scale   SubCutaneous three times a day before meals  insulin lispro (ADMELOG) corrective regimen sliding scale   SubCutaneous at bedtime  isosorbide   mononitrate ER Tablet (IMDUR) 30 milliGRAM(s) Oral daily  lactobacillus acidophilus 1 Tablet(s) Oral three times a day  losartan 25 milliGRAM(s) Oral daily  methimazole 10 milliGRAM(s) Oral daily  metoprolol succinate ER 50 milliGRAM(s) Oral daily  morphine  - Injectable 2 milliGRAM(s) IV Push every 6 hours PRN  multivitamin 1 Tablet(s) Oral daily  ondansetron Injectable 4 milliGRAM(s) IV Push four times a day PRN  tamsulosin 0.4 milliGRAM(s) Oral at bedtime  vancomycin    Solution 125 milliGRAM(s) Oral every 6 hours      PMHX/PSHX:  No pertinent past medical history    Diabetes    No pertinent past medical history    Diabetes mellitus with no complication    Afib    Hypertension    BPH (benign prostatic hyperplasia)    Perforated gastric ulcer    Pulmonary embolism    History of non-ST elevation myocardial infarction (NSTEMI)    Osteomyelitis    CAD S/P percutaneous coronary angioplasty    Cerebrovascular accident    No significant past surgical history    No significant past surgical history    H/O abdominal surgery    Perforated gastric ulcer    Traumatic amputation of left foot, initial encounter        Family history:  No pertinent family history in first degree relatives    No pertinent family history in first degree relatives    FH: pulmonary embolism    FH: coronary artery disease    FH: stroke        Social History:     ROS:     General:  No wt loss, fevers, chills, night sweats, fatigue,   Eyes:  Good vision, no reported pain  ENT:  No sore throat, pain, runny nose, dysphagia  CV:  No pain, palpitations, hypo/hypertension  Resp:  No dyspnea, cough, tachypnea, wheezing  GI:  Pos abd pain, N/V/ D  No weight loss, No fever, No pruritis, No rectal bleeding, No tarry stools, No dysphagia,  :  No pain, bleeding, incontinence, nocturia  Muscle:  No pain, weakness  Neuro:  No weakness, tingling, memory problems  Psych:  No fatigue, insomnia, mood problems, depression  Endocrine:  No polyuria, polydipsia, cold/heat intolerance  Heme:  No petechiae, ecchymosis, easy bruisability  Skin:  No rash, tattoos, scars, edema      PHYSICAL EXAM:   Vital Signs:  Vital Signs Last 24 Hrs  T(C): 37.2 (2022 20:05), Max: 38.2 (2022 15:49)  T(F): 98.9 (2022 20:05), Max: 100.7 (2022 15:49)  HR: 90 (2022 09:37) (90 - 112)  BP: 112/61 (2022 09:37) (112/61 - 124/61)  BP(mean): --  RR: 15 (2022 09:37) (15 - 18)  SpO2: 98% (2022 09:37) (97% - 98%)  Daily Height in cm: 187.96 (2022 15:49)    Daily     GENERAL:  Appears stated age,   HEENT:  NC/AT,    CHEST:  Full & symmetric excursion,   HEART:  Regular rhythm  ABDOMEN:  Softly distended,  tender to touch   EXTEREMITIES:  no cyanosis,clubbing or edema  SKIN:  No rash  NEURO:  Alert,    LABS:                        16.4   9.53  )-----------( 214      ( 2022 16:44 )             48.7         137  |  105  |  22  ----------------------------<  186<H>  4.2   |  25  |  1.20    Ca    9.2      2022 16:44    TPro  8.4<H>  /  Alb  3.8  /  TBili  0.5  /  DBili  x   /  AST  18  /  ALT  26  /  AlkPhos  134<H>      LIVER FUNCTIONS - ( 2022 16:44 )  Alb: 3.8 g/dL / Pro: 8.4 g/dL / ALK PHOS: 134 U/L / ALT: 26 U/L / AST: 18 U/L / GGT: x             Urinalysis Basic - ( 2022 16:44 )    Color: Yellow / Appearance: Slightly Turbid / S.015 / pH: x  Gluc: x / Ketone: Trace  / Bili: Small / Urobili: Negative   Blood: x / Protein: 30 mg/dL / Nitrite: Negative   Leuk Esterase: Moderate / RBC: 11-25 /HPF / WBC 11-25   Sq Epi: x / Non Sq Epi: Occasional / Bacteria: Moderate      Amylase Serum--      Lipase serum58       Ammonia--      Imaging:    ACC: [id number] EXAM:  CT ABDOMEN AND PELVIS IC                          PROCEDURE DATE:  2022          INTERPRETATION:  CLINICAL INFORMATION: Abdominal pain. Fever.    COMPARISON: Noncontrast CT of the abdomen and pelvis dated 2019.    CONTRAST/COMPLICATIONS:  IV Contrast: NONE  90 cc administered   10 cc discarded  Oral Contrast: NONE  Complications: None reported at time of study completion    PROCEDURE:  CT of the Abdomen and Pelvis was performed.  Sagittal and coronal reformats were performed.    FINDINGS:  LOWER CHEST: Within normal limits.    LIVER: Within normal limits.  BILE DUCTS: Normal caliber.  GALLBLADDER: Within normal limits.  SPLEEN: Within normal limits.  PANCREAS: Mildly atrophic and fatty replaced.  ADRENALS: Within normal limits.  KIDNEYS/URETERS: Within normal limits.    BLADDER: Jacques catheter in the collapsed bladder.  REPRODUCTIVE ORGANS: The uterus and adnexa are unremarkable by CT   criteria.    BOWEL: The fluid-filled stomach is unremarkable. Scattered colonic   diverticula. There is mild concentric wall thickening and mucosal   hyperenhancement of the fluid-filled colon. There is no pneumatosis or   stranding of the mesenteric fat. These findings are compatible with mild   pancolitis, likely infectious.  PERITONEUM: No ascites.  VESSELS: Atherosclerotic disease of the nonaneurysmal abdominal aorta.  RETROPERITONEUM/LYMPH NODES: No lymphadenopathy.  ABDOMINAL WALL: Bilobed fat-containing umbilical hernia.  BONES: Healing left lateral rib fractures.    IMPRESSION: Mild pancolitis, likely infectious.    --- End of Report ---            ANTONIO JOYCE MD; Attending Radiologist  This document has been electronically signed. 2022  7:07PM

## 2022-01-30 NOTE — PROVIDER CONTACT NOTE (CRITICAL VALUE NOTIFICATION) - SITUATION
Core lab called about patient result . covering for Khushbu LOVELACE . Call out to DR Brizuela  awaiting his response.

## 2022-01-31 LAB
A1C WITH ESTIMATED AVERAGE GLUCOSE RESULT: 6.8 % — HIGH (ref 4–5.6)
ANION GAP SERPL CALC-SCNC: 4 MMOL/L — LOW (ref 5–17)
BUN SERPL-MCNC: 8 MG/DL — SIGNIFICANT CHANGE UP (ref 7–23)
CALCIUM SERPL-MCNC: 8.2 MG/DL — LOW (ref 8.5–10.1)
CHLORIDE SERPL-SCNC: 115 MMOL/L — HIGH (ref 96–108)
CO2 SERPL-SCNC: 24 MMOL/L — SIGNIFICANT CHANGE UP (ref 22–31)
CREAT SERPL-MCNC: 1 MG/DL — SIGNIFICANT CHANGE UP (ref 0.5–1.3)
ESTIMATED AVERAGE GLUCOSE: 148 MG/DL — HIGH (ref 68–114)
GLUCOSE SERPL-MCNC: 115 MG/DL — HIGH (ref 70–99)
HCT VFR BLD CALC: 36.7 % — LOW (ref 39–50)
HGB BLD-MCNC: 12.2 G/DL — LOW (ref 13–17)
MCHC RBC-ENTMCNC: 30 PG — SIGNIFICANT CHANGE UP (ref 27–34)
MCHC RBC-ENTMCNC: 33.2 GM/DL — SIGNIFICANT CHANGE UP (ref 32–36)
MCV RBC AUTO: 90.4 FL — SIGNIFICANT CHANGE UP (ref 80–100)
NRBC # BLD: 0 /100 WBCS — SIGNIFICANT CHANGE UP (ref 0–0)
PLATELET # BLD AUTO: 161 K/UL — SIGNIFICANT CHANGE UP (ref 150–400)
POTASSIUM SERPL-MCNC: 3.5 MMOL/L — SIGNIFICANT CHANGE UP (ref 3.5–5.3)
POTASSIUM SERPL-SCNC: 3.5 MMOL/L — SIGNIFICANT CHANGE UP (ref 3.5–5.3)
RBC # BLD: 4.06 M/UL — LOW (ref 4.2–5.8)
RBC # FLD: 13.1 % — SIGNIFICANT CHANGE UP (ref 10.3–14.5)
SODIUM SERPL-SCNC: 143 MMOL/L — SIGNIFICANT CHANGE UP (ref 135–145)
T3FREE SERPL-MCNC: 2.53 PG/ML — SIGNIFICANT CHANGE UP (ref 1.8–4.6)
T4 FREE SERPL-MCNC: 1.5 NG/DL — SIGNIFICANT CHANGE UP (ref 0.9–1.8)
TSH SERPL-MCNC: 1.33 UIU/ML — SIGNIFICANT CHANGE UP (ref 0.36–3.74)
WBC # BLD: 3.04 K/UL — LOW (ref 3.8–10.5)
WBC # FLD AUTO: 3.04 K/UL — LOW (ref 3.8–10.5)

## 2022-01-31 PROCEDURE — 76705 ECHO EXAM OF ABDOMEN: CPT | Mod: 26,RT

## 2022-01-31 PROCEDURE — 99497 ADVNCD CARE PLAN 30 MIN: CPT

## 2022-01-31 RX ORDER — LOPERAMIDE HCL 2 MG
2 TABLET ORAL
Refills: 0 | Status: DISCONTINUED | OUTPATIENT
Start: 2022-01-31 | End: 2022-02-04

## 2022-01-31 RX ORDER — INSULIN GLARGINE 100 [IU]/ML
10 INJECTION, SOLUTION SUBCUTANEOUS AT BEDTIME
Refills: 0 | Status: DISCONTINUED | OUTPATIENT
Start: 2022-01-31 | End: 2022-02-04

## 2022-01-31 RX ADMIN — MORPHINE SULFATE 2 MILLIGRAM(S): 50 CAPSULE, EXTENDED RELEASE ORAL at 13:15

## 2022-01-31 RX ADMIN — Medication 81 MILLIGRAM(S): at 12:29

## 2022-01-31 RX ADMIN — MORPHINE SULFATE 2 MILLIGRAM(S): 50 CAPSULE, EXTENDED RELEASE ORAL at 06:59

## 2022-01-31 RX ADMIN — Medication 25 MILLIGRAM(S): at 21:52

## 2022-01-31 RX ADMIN — APIXABAN 5 MILLIGRAM(S): 2.5 TABLET, FILM COATED ORAL at 05:23

## 2022-01-31 RX ADMIN — Medication 25 MILLIGRAM(S): at 12:29

## 2022-01-31 RX ADMIN — ONDANSETRON 4 MILLIGRAM(S): 8 TABLET, FILM COATED ORAL at 12:42

## 2022-01-31 RX ADMIN — ONDANSETRON 4 MILLIGRAM(S): 8 TABLET, FILM COATED ORAL at 21:52

## 2022-01-31 RX ADMIN — Medication 250 MILLIGRAM(S): at 05:26

## 2022-01-31 RX ADMIN — MORPHINE SULFATE 2 MILLIGRAM(S): 50 CAPSULE, EXTENDED RELEASE ORAL at 00:27

## 2022-01-31 RX ADMIN — ISOSORBIDE MONONITRATE 30 MILLIGRAM(S): 60 TABLET, EXTENDED RELEASE ORAL at 12:29

## 2022-01-31 RX ADMIN — GABAPENTIN 100 MILLIGRAM(S): 400 CAPSULE ORAL at 12:29

## 2022-01-31 RX ADMIN — MORPHINE SULFATE 2 MILLIGRAM(S): 50 CAPSULE, EXTENDED RELEASE ORAL at 06:17

## 2022-01-31 RX ADMIN — ONDANSETRON 4 MILLIGRAM(S): 8 TABLET, FILM COATED ORAL at 06:17

## 2022-01-31 RX ADMIN — FAMOTIDINE 20 MILLIGRAM(S): 10 INJECTION INTRAVENOUS at 12:29

## 2022-01-31 RX ADMIN — TAMSULOSIN HYDROCHLORIDE 0.4 MILLIGRAM(S): 0.4 CAPSULE ORAL at 21:52

## 2022-01-31 RX ADMIN — Medication 1 TABLET(S): at 05:23

## 2022-01-31 RX ADMIN — Medication 1 TABLET(S): at 21:52

## 2022-01-31 RX ADMIN — MORPHINE SULFATE 2 MILLIGRAM(S): 50 CAPSULE, EXTENDED RELEASE ORAL at 12:42

## 2022-01-31 RX ADMIN — Medication 1 TABLET(S): at 12:29

## 2022-01-31 RX ADMIN — APIXABAN 5 MILLIGRAM(S): 2.5 TABLET, FILM COATED ORAL at 18:36

## 2022-01-31 RX ADMIN — CHOLESTYRAMINE 4 GRAM(S): 4 POWDER, FOR SUSPENSION ORAL at 16:32

## 2022-01-31 RX ADMIN — Medication 25 MILLIGRAM(S): at 05:24

## 2022-01-31 RX ADMIN — INSULIN GLARGINE 10 UNIT(S): 100 INJECTION, SOLUTION SUBCUTANEOUS at 22:05

## 2022-01-31 RX ADMIN — Medication 250 MILLIGRAM(S): at 18:36

## 2022-01-31 RX ADMIN — ATORVASTATIN CALCIUM 40 MILLIGRAM(S): 80 TABLET, FILM COATED ORAL at 21:52

## 2022-01-31 NOTE — OCCUPATIONAL THERAPY INITIAL EVALUATION ADULT - DIAGNOSIS, OT EVAL
Patient with decreased ADL status and impairments with functional mobility. Patient with potential decreased ADL status and impairments with functional mobility.

## 2022-01-31 NOTE — OCCUPATIONAL THERAPY INITIAL EVALUATION ADULT - PERTINENT HX OF CURRENT PROBLEM, REHAB EVAL
52 y/o male with multiple medical problems admitted to ER secondary to abdominal pain. Pt admitted on 1/28/2022 with ulcerative chronic pancolitis with fever and abd pain n/v/d sec viral gastroenteritis GI PCR + Rotavirus, Cdiff negative.

## 2022-01-31 NOTE — PROGRESS NOTE ADULT - ASSESSMENT
Patient is a 50 year old male with multiple medical problems admitted with fever abd pain n/v/d sec viral gastroenteritis GI PCR + Rotavirus. Cdiff negative.   Clinically better/stable    Recommendations:   Continue off antibiotics  Trend temps and cbc  IVF per primary team  GI following  Contact isolation      Jeffery Jacobs M.D.  [facility], Division of Infectious Diseases  [phone]  After 5pm on weekdays and all day on weekends - please call [phone]

## 2022-01-31 NOTE — OCCUPATIONAL THERAPY INITIAL EVALUATION ADULT - ADDITIONAL COMMENTS
Pt is a LTC resident at [facility]. Pt reports that he used a rolling walker and w/c for functional mobility. Pt is functioning at baseline. Pt performed functional ambulation in hospital room using rolling walker with supervision.

## 2022-01-31 NOTE — PROGRESS NOTE ADULT - ASSESSMENT
diarrhea  r/o cdiff        Diarrhea   Cdiff neg now off vanco   GI PCR noted  on probiotic   on questran   monitor frequency/quality   reg diet  immodium prn  us noted  [facility] wnl  will follow    Advanced care planning was discussed with patient and family.  Advanced care planning forms were reviewed and discussed.  Risks, benefits and alternatives of gastroenterologic procedures were discussed in detail and all questions were answered.    30 minutes spent.

## 2022-01-31 NOTE — GOALS OF CARE CONVERSATION - ADVANCED CARE PLANNING - CONVERSATION DETAILS
Writer met with...... Reviewed patient's medical and social history as well as events leading to patient's hospitalization. Writer discussed patient's current diagnosis abdominal pain, diarrhea CAD s/p stent CVA HTN DM . medical condition and management,... prognosis, and life expectancy. Inquired about patient' HCP He states he does not have HCP and no one to appoint as agent Writer recommended he put in writing wishes regarding extent of medical care to be provided including escalation of medical care into the ICU intubation and vent support  and use of vasopressor support. Also  thoughts regarding cardiopulmonary resuscitation, artificial nutrition and hydration including use of feeding tubes and IVF, antibiotics, and further investigative studies such as blood draws and radiology as a living will for his medical team. Pt states he will consider and we will discuss again. Reviewed patient's medical and social history as well as events leading to patient's hospitalization. Writer discussed patient's current diagnosis abdominal pain, diarrhea CAD s/p stent CVA HTN DM . medical condition and management,... prognosis, and life expectancy. Inquired about patient' HCP He states he does not have HCP and no one to appoint as agent Writer recommended he put in writing wishes regarding extent of medical care to be provided including escalation of medical care into the ICU intubation and vent support  and use of vasopressor support. Also  thoughts regarding cardiopulmonary resuscitation, artificial nutrition and hydration including use of feeding tubes and IVF, antibiotics, and further investigative studies such as blood draws and radiology as a living will for his medical team. Pt states he will consider and we will discuss again.

## 2022-02-01 ENCOUNTER — TRANSCRIPTION ENCOUNTER (OUTPATIENT)
Age: 52
End: 2022-02-01

## 2022-02-01 LAB
ANION GAP SERPL CALC-SCNC: 4 MMOL/L — LOW (ref 5–17)
BUN SERPL-MCNC: 8 MG/DL — SIGNIFICANT CHANGE UP (ref 7–23)
CALCIUM SERPL-MCNC: 8.4 MG/DL — LOW (ref 8.5–10.1)
CHLORIDE SERPL-SCNC: 115 MMOL/L — HIGH (ref 96–108)
CO2 SERPL-SCNC: 25 MMOL/L — SIGNIFICANT CHANGE UP (ref 22–31)
CREAT SERPL-MCNC: 0.96 MG/DL — SIGNIFICANT CHANGE UP (ref 0.5–1.3)
CULTURE RESULTS: SIGNIFICANT CHANGE UP
CULTURE RESULTS: SIGNIFICANT CHANGE UP
GLUCOSE SERPL-MCNC: 114 MG/DL — HIGH (ref 70–99)
HCT VFR BLD CALC: 36.7 % — LOW (ref 39–50)
HGB BLD-MCNC: 12.2 G/DL — LOW (ref 13–17)
MCHC RBC-ENTMCNC: 29.8 PG — SIGNIFICANT CHANGE UP (ref 27–34)
MCHC RBC-ENTMCNC: 33.2 GM/DL — SIGNIFICANT CHANGE UP (ref 32–36)
MCV RBC AUTO: 89.5 FL — SIGNIFICANT CHANGE UP (ref 80–100)
NRBC # BLD: 0 /100 WBCS — SIGNIFICANT CHANGE UP (ref 0–0)
PLATELET # BLD AUTO: 169 K/UL — SIGNIFICANT CHANGE UP (ref 150–400)
POTASSIUM SERPL-MCNC: 3.3 MMOL/L — LOW (ref 3.5–5.3)
POTASSIUM SERPL-SCNC: 3.3 MMOL/L — LOW (ref 3.5–5.3)
RBC # BLD: 4.1 M/UL — LOW (ref 4.2–5.8)
RBC # FLD: 13.1 % — SIGNIFICANT CHANGE UP (ref 10.3–14.5)
SARS-COV-2 RNA SPEC QL NAA+PROBE: SIGNIFICANT CHANGE UP
SODIUM SERPL-SCNC: 144 MMOL/L — SIGNIFICANT CHANGE UP (ref 135–145)
SPECIMEN SOURCE: SIGNIFICANT CHANGE UP
SPECIMEN SOURCE: SIGNIFICANT CHANGE UP
WBC # BLD: 3.78 K/UL — LOW (ref 3.8–10.5)
WBC # FLD AUTO: 3.78 K/UL — LOW (ref 3.8–10.5)

## 2022-02-01 RX ORDER — POTASSIUM CHLORIDE 20 MEQ
1 PACKET (EA) ORAL
Qty: 0 | Refills: 0 | DISCHARGE
Start: 2022-02-01

## 2022-02-01 RX ORDER — SENNA PLUS 8.6 MG/1
2 TABLET ORAL
Qty: 0 | Refills: 0 | DISCHARGE

## 2022-02-01 RX ORDER — OXYCODONE HYDROCHLORIDE 5 MG/1
1 TABLET ORAL
Qty: 0 | Refills: 0 | DISCHARGE

## 2022-02-01 RX ORDER — LOPERAMIDE HCL 2 MG
1 TABLET ORAL
Qty: 0 | Refills: 0 | DISCHARGE
Start: 2022-02-01

## 2022-02-01 RX ORDER — SACCHAROMYCES BOULARDII 250 MG
1 POWDER IN PACKET (EA) ORAL
Qty: 0 | Refills: 0 | DISCHARGE
Start: 2022-02-01

## 2022-02-01 RX ORDER — INSULIN LISPRO 100/ML
0 VIAL (ML) SUBCUTANEOUS
Qty: 0 | Refills: 0 | DISCHARGE

## 2022-02-01 RX ORDER — TRAMADOL HYDROCHLORIDE 50 MG/1
0.5 TABLET ORAL
Qty: 0 | Refills: 0 | DISCHARGE

## 2022-02-01 RX ORDER — DOCUSATE SODIUM 100 MG
3 CAPSULE ORAL
Qty: 0 | Refills: 0 | DISCHARGE

## 2022-02-01 RX ORDER — POTASSIUM CHLORIDE 20 MEQ
40 PACKET (EA) ORAL EVERY 4 HOURS
Refills: 0 | Status: COMPLETED | OUTPATIENT
Start: 2022-02-01 | End: 2022-02-01

## 2022-02-01 RX ADMIN — Medication 1 TABLET(S): at 11:51

## 2022-02-01 RX ADMIN — Medication 0: at 22:10

## 2022-02-01 RX ADMIN — Medication 25 MILLIGRAM(S): at 21:46

## 2022-02-01 RX ADMIN — CHOLESTYRAMINE 4 GRAM(S): 4 POWDER, FOR SUSPENSION ORAL at 08:28

## 2022-02-01 RX ADMIN — Medication 81 MILLIGRAM(S): at 11:51

## 2022-02-01 RX ADMIN — APIXABAN 5 MILLIGRAM(S): 2.5 TABLET, FILM COATED ORAL at 17:13

## 2022-02-01 RX ADMIN — Medication 1 TABLET(S): at 13:28

## 2022-02-01 RX ADMIN — Medication 40 MILLIEQUIVALENT(S): at 17:13

## 2022-02-01 RX ADMIN — ONDANSETRON 4 MILLIGRAM(S): 8 TABLET, FILM COATED ORAL at 08:32

## 2022-02-01 RX ADMIN — Medication 50 MILLIGRAM(S): at 05:12

## 2022-02-01 RX ADMIN — Medication 40 MILLIEQUIVALENT(S): at 13:28

## 2022-02-01 RX ADMIN — LOSARTAN POTASSIUM 25 MILLIGRAM(S): 100 TABLET, FILM COATED ORAL at 05:12

## 2022-02-01 RX ADMIN — ISOSORBIDE MONONITRATE 30 MILLIGRAM(S): 60 TABLET, EXTENDED RELEASE ORAL at 11:51

## 2022-02-01 RX ADMIN — Medication 250 MILLIGRAM(S): at 05:11

## 2022-02-01 RX ADMIN — Medication 25 MILLIGRAM(S): at 05:12

## 2022-02-01 RX ADMIN — Medication 1 TABLET(S): at 05:11

## 2022-02-01 RX ADMIN — FAMOTIDINE 20 MILLIGRAM(S): 10 INJECTION INTRAVENOUS at 11:51

## 2022-02-01 RX ADMIN — INSULIN GLARGINE 10 UNIT(S): 100 INJECTION, SOLUTION SUBCUTANEOUS at 22:09

## 2022-02-01 RX ADMIN — Medication 1 TABLET(S): at 21:44

## 2022-02-01 RX ADMIN — ATORVASTATIN CALCIUM 40 MILLIGRAM(S): 80 TABLET, FILM COATED ORAL at 21:45

## 2022-02-01 RX ADMIN — Medication 25 MILLIGRAM(S): at 13:28

## 2022-02-01 RX ADMIN — TAMSULOSIN HYDROCHLORIDE 0.4 MILLIGRAM(S): 0.4 CAPSULE ORAL at 21:44

## 2022-02-01 RX ADMIN — Medication 250 MILLIGRAM(S): at 17:13

## 2022-02-01 RX ADMIN — GABAPENTIN 100 MILLIGRAM(S): 400 CAPSULE ORAL at 11:56

## 2022-02-01 RX ADMIN — APIXABAN 5 MILLIGRAM(S): 2.5 TABLET, FILM COATED ORAL at 05:12

## 2022-02-01 NOTE — DISCHARGE NOTE PROVIDER - HOSPITAL COURSE
admitted for Gastroenteritis  viral etiology  potassium supplemented for hypokalemia  DC after GI clearance

## 2022-02-01 NOTE — DISCHARGE NOTE PROVIDER - NSDCCPCAREPLAN_GEN_ALL_CORE_FT
PRINCIPAL DISCHARGE DIAGNOSIS  Diagnosis: Pancolitis  Assessment and Plan of Treatment: follow up with rehab MD Dr. koenig      SECONDARY DISCHARGE DIAGNOSES  Diagnosis: Acute UTI  Assessment and Plan of Treatment:     Diagnosis: Diarrhea  Assessment and Plan of Treatment:

## 2022-02-01 NOTE — PROGRESS NOTE ADULT - ASSESSMENT
diarrhea  r/o cdiff        Diarrhea   Cdiff neg now off vanco   GI PCR noted  on probiotic   on questran   monitor frequency/quality   reg diet  immodium prn  us noted  [facility] wnl  will follow  dc planning    Advanced care planning was discussed with patient and family.  Advanced care planning forms were reviewed and discussed.  Risks, benefits and alternatives of gastroenterologic procedures were discussed in detail and all questions were answered.    30 minutes spent.

## 2022-02-01 NOTE — DISCHARGE NOTE PROVIDER - CARE PROVIDER_API CALL
Grisel Murray)  Internal Medicine  [address] [place] [address]  Phone: [phone]  Fax: [phone]  Follow Up Time:

## 2022-02-01 NOTE — PROGRESS NOTE ADULT - ASSESSMENT
Patient is a 50 year old male with multiple medical problems admitted with fever abd pain n/v/d sec viral gastroenteritis GI PCR + Rotavirus. Cdiff negative.   Clinically better/stable    Recommendations:   Continue off antibiotics  Trend temps and cbc  IVF per primary team  GI following, on imodium  Contact isolation      Jeffery Jacobs M.D.  [facility], Division of Infectious Diseases  [phone]  After 5pm on weekdays and all day on weekends - please call [phone]

## 2022-02-01 NOTE — DISCHARGE NOTE PROVIDER - NSDCMRMEDTOKEN_GEN_ALL_CORE_FT
Admelog SoloStar 100 units/mL injectable solution: 3 unit(s) injectable 3 times a day  apixaban 5 mg oral tablet: 1 tab(s) orally 2 times a day  aspirin 81 mg oral tablet, chewable: 1 tab(s) orally once a day  atorvastatin 40 mg oral tablet: 1 tab(s) orally once a day  Basaglar KwikPen 100 units/mL subcutaneous solution: 10 unit(s) subcutaneous 2 times a day  bethanechol 25 mg oral tablet: 1 tab(s) orally 3 times a day  famotidine 20 mg oral tablet: 1 tab(s) orally once a day (in the morning)  ferrous sulfate 325 mg (65 mg elemental iron) oral tablet: 1 tab(s) orally 2 times a day  Flomax 0.4 mg oral capsule: 2 cap(s) orally once a day (in the evening)  gabapentin 100 mg oral capsule: 1 cap(s) orally once a day (at bedtime)  Imdur 30 mg oral tablet, extended release: 1 tab(s) orally once a day (in the morning)  loperamide 2 mg oral capsule: 1 cap(s) orally 4 times a day, As needed, Diarrhea  losartan 25 mg oral tablet: 1 tab(s) orally once a day  metFORMIN 1000 mg oral tablet: 1 tab(s) orally 2 times a day with morning and evening meals  methIMAzole 10 mg oral tablet: 1 tab(s) orally once a day  metoprolol succinate 50 mg oral tablet, extended release: 1 tab(s) orally once a day  Milk of Magnesia 8% oral suspension: 30 milliliter(s) orally once a day (at bedtime), As Needed  multivitamin: 1 tab(s) orally once a day  ondansetron 4 mg oral tablet: 1 tab(s) orally every 8 hours, As Needed - for nausea  pantoprazole 40 mg oral delayed release tablet: 1 tab(s) orally once a day  potassium chloride 10 mEq oral tablet, extended release: 1 tab(s) orally 2 times a day  saccharomyces boulardii lyo 250 mg oral capsule: 1 cap(s) orally 2 times a day  sucralfate 1 g oral tablet: 1 tab(s) orally 4 times a day (before meals and at bedtime)  Tylenol 325 mg oral tablet: 2 tab(s) orally every 6 hours, As Needed  Vitamin C 1000 mg oral tablet: 1 tab(s) orally once a day

## 2022-02-02 PROCEDURE — 99231 SBSQ HOSP IP/OBS SF/LOW 25: CPT

## 2022-02-02 RX ADMIN — Medication 250 MILLIGRAM(S): at 05:24

## 2022-02-02 RX ADMIN — Medication 1 TABLET(S): at 22:00

## 2022-02-02 RX ADMIN — Medication 1 TABLET(S): at 13:17

## 2022-02-02 RX ADMIN — ONDANSETRON 4 MILLIGRAM(S): 8 TABLET, FILM COATED ORAL at 09:21

## 2022-02-02 RX ADMIN — APIXABAN 5 MILLIGRAM(S): 2.5 TABLET, FILM COATED ORAL at 05:18

## 2022-02-02 RX ADMIN — Medication 250 MILLIGRAM(S): at 17:17

## 2022-02-02 RX ADMIN — Medication 25 MILLIGRAM(S): at 22:01

## 2022-02-02 RX ADMIN — ISOSORBIDE MONONITRATE 30 MILLIGRAM(S): 60 TABLET, EXTENDED RELEASE ORAL at 11:47

## 2022-02-02 RX ADMIN — Medication 1 TABLET(S): at 05:18

## 2022-02-02 RX ADMIN — INSULIN GLARGINE 10 UNIT(S): 100 INJECTION, SOLUTION SUBCUTANEOUS at 22:00

## 2022-02-02 RX ADMIN — APIXABAN 5 MILLIGRAM(S): 2.5 TABLET, FILM COATED ORAL at 17:17

## 2022-02-02 RX ADMIN — LOSARTAN POTASSIUM 25 MILLIGRAM(S): 100 TABLET, FILM COATED ORAL at 05:18

## 2022-02-02 RX ADMIN — Medication 1 TABLET(S): at 11:47

## 2022-02-02 RX ADMIN — Medication 81 MILLIGRAM(S): at 11:47

## 2022-02-02 RX ADMIN — TAMSULOSIN HYDROCHLORIDE 0.4 MILLIGRAM(S): 0.4 CAPSULE ORAL at 22:00

## 2022-02-02 RX ADMIN — GABAPENTIN 100 MILLIGRAM(S): 400 CAPSULE ORAL at 11:47

## 2022-02-02 RX ADMIN — Medication 25 MILLIGRAM(S): at 05:18

## 2022-02-02 RX ADMIN — Medication 25 MILLIGRAM(S): at 13:17

## 2022-02-02 RX ADMIN — FAMOTIDINE 20 MILLIGRAM(S): 10 INJECTION INTRAVENOUS at 11:47

## 2022-02-02 RX ADMIN — CHOLESTYRAMINE 4 GRAM(S): 4 POWDER, FOR SUSPENSION ORAL at 08:49

## 2022-02-02 RX ADMIN — ATORVASTATIN CALCIUM 40 MILLIGRAM(S): 80 TABLET, FILM COATED ORAL at 22:00

## 2022-02-02 RX ADMIN — Medication 50 MILLIGRAM(S): at 08:49

## 2022-02-02 NOTE — PROGRESS NOTE ADULT - ASSESSMENT
diarrhea  r/o cdiff    Diarrhea   C. diff neg now off vanco   GI PCR noted  on probiotic   on questran   monitor frequency/quality   reg diet  imodium prn  us noted  [facility] wnl  will follow  dc planning    Advanced care planning was discussed with patient and family.  Advanced care planning forms were reviewed and discussed.  Risks, benefits and alternatives of gastroenterologic procedures were discussed in detail and all questions were answered.    30 minutes spent.

## 2022-02-02 NOTE — PROGRESS NOTE ADULT - ASSESSMENT
Patient is a 50 year old male with multiple medical problems admitted with fever abd pain n/v/d sec viral gastroenteritis GI PCR + Rotavirus. Cdiff negative.   Clinically better/stable, diarrhea much improved    Recommendations:   Continue off antibiotics  GI following  Contact isolation  Discharge planning per primary team.      Jeffery Jacobs M.D.  [facility], Division of Infectious Diseases  [phone]  After 5pm on weekdays and all day on weekends - please call [phone]

## 2022-02-03 LAB
ANION GAP SERPL CALC-SCNC: 6 MMOL/L — SIGNIFICANT CHANGE UP (ref 5–17)
BUN SERPL-MCNC: 12 MG/DL — SIGNIFICANT CHANGE UP (ref 7–23)
CALCIUM SERPL-MCNC: 9 MG/DL — SIGNIFICANT CHANGE UP (ref 8.5–10.1)
CHLORIDE SERPL-SCNC: 108 MMOL/L — SIGNIFICANT CHANGE UP (ref 96–108)
CO2 SERPL-SCNC: 26 MMOL/L — SIGNIFICANT CHANGE UP (ref 22–31)
CREAT SERPL-MCNC: 0.96 MG/DL — SIGNIFICANT CHANGE UP (ref 0.5–1.3)
GLUCOSE SERPL-MCNC: 176 MG/DL — HIGH (ref 70–99)
HCT VFR BLD CALC: 39.4 % — SIGNIFICANT CHANGE UP (ref 39–50)
HGB BLD-MCNC: 13.7 G/DL — SIGNIFICANT CHANGE UP (ref 13–17)
MCHC RBC-ENTMCNC: 30.2 PG — SIGNIFICANT CHANGE UP (ref 27–34)
MCHC RBC-ENTMCNC: 34.8 GM/DL — SIGNIFICANT CHANGE UP (ref 32–36)
MCV RBC AUTO: 86.8 FL — SIGNIFICANT CHANGE UP (ref 80–100)
NRBC # BLD: 0 /100 WBCS — SIGNIFICANT CHANGE UP (ref 0–0)
PLATELET # BLD AUTO: 219 K/UL — SIGNIFICANT CHANGE UP (ref 150–400)
POTASSIUM SERPL-MCNC: 4.1 MMOL/L — SIGNIFICANT CHANGE UP (ref 3.5–5.3)
POTASSIUM SERPL-SCNC: 4.1 MMOL/L — SIGNIFICANT CHANGE UP (ref 3.5–5.3)
RBC # BLD: 4.54 M/UL — SIGNIFICANT CHANGE UP (ref 4.2–5.8)
RBC # FLD: 13 % — SIGNIFICANT CHANGE UP (ref 10.3–14.5)
SODIUM SERPL-SCNC: 140 MMOL/L — SIGNIFICANT CHANGE UP (ref 135–145)
WBC # BLD: 7.97 K/UL — SIGNIFICANT CHANGE UP (ref 3.8–10.5)
WBC # FLD AUTO: 7.97 K/UL — SIGNIFICANT CHANGE UP (ref 3.8–10.5)

## 2022-02-03 PROCEDURE — 99231 SBSQ HOSP IP/OBS SF/LOW 25: CPT

## 2022-02-03 RX ADMIN — Medication 1 TABLET(S): at 11:08

## 2022-02-03 RX ADMIN — TAMSULOSIN HYDROCHLORIDE 0.4 MILLIGRAM(S): 0.4 CAPSULE ORAL at 21:57

## 2022-02-03 RX ADMIN — FAMOTIDINE 20 MILLIGRAM(S): 10 INJECTION INTRAVENOUS at 11:09

## 2022-02-03 RX ADMIN — LOSARTAN POTASSIUM 25 MILLIGRAM(S): 100 TABLET, FILM COATED ORAL at 05:43

## 2022-02-03 RX ADMIN — APIXABAN 5 MILLIGRAM(S): 2.5 TABLET, FILM COATED ORAL at 05:43

## 2022-02-03 RX ADMIN — ISOSORBIDE MONONITRATE 30 MILLIGRAM(S): 60 TABLET, EXTENDED RELEASE ORAL at 11:08

## 2022-02-03 RX ADMIN — Medication 1: at 17:04

## 2022-02-03 RX ADMIN — INSULIN GLARGINE 10 UNIT(S): 100 INJECTION, SOLUTION SUBCUTANEOUS at 22:26

## 2022-02-03 RX ADMIN — ATORVASTATIN CALCIUM 40 MILLIGRAM(S): 80 TABLET, FILM COATED ORAL at 21:57

## 2022-02-03 RX ADMIN — Medication 250 MILLIGRAM(S): at 17:04

## 2022-02-03 RX ADMIN — Medication 81 MILLIGRAM(S): at 11:08

## 2022-02-03 RX ADMIN — GABAPENTIN 100 MILLIGRAM(S): 400 CAPSULE ORAL at 11:08

## 2022-02-03 RX ADMIN — Medication 1: at 08:02

## 2022-02-03 RX ADMIN — CHOLESTYRAMINE 4 GRAM(S): 4 POWDER, FOR SUSPENSION ORAL at 08:56

## 2022-02-03 RX ADMIN — Medication 25 MILLIGRAM(S): at 21:57

## 2022-02-03 RX ADMIN — Medication 25 MILLIGRAM(S): at 05:43

## 2022-02-03 RX ADMIN — Medication 1 TABLET(S): at 21:57

## 2022-02-03 RX ADMIN — Medication 250 MILLIGRAM(S): at 05:43

## 2022-02-03 RX ADMIN — APIXABAN 5 MILLIGRAM(S): 2.5 TABLET, FILM COATED ORAL at 17:04

## 2022-02-03 RX ADMIN — Medication 1 TABLET(S): at 05:43

## 2022-02-03 RX ADMIN — Medication 25 MILLIGRAM(S): at 13:27

## 2022-02-03 RX ADMIN — Medication 1 TABLET(S): at 13:27

## 2022-02-03 NOTE — PROGRESS NOTE ADULT - PROBLEM SELECTOR PROBLEM 3
Diarrheal disease

## 2022-02-03 NOTE — PROGRESS NOTE ADULT - PROBLEM SELECTOR PLAN 5
hx PCI with stent- cont asa, statin

## 2022-02-03 NOTE — PROGRESS NOTE ADULT - ASSESSMENT
Patient is a 50 year old male with multiple medical problems admitted with fever abd pain n/v/d sec viral gastroenteritis GI PCR + Rotavirus. Cdiff negative.   Clinically better/stable, diarrhea much improved    Recommendations:   Continue off antibiotics  GI and Surgery following  Contact isolation  Discharge planning per primary team.      Jeffery Jacobs M.D.  [facility], Division of Infectious Diseases  [phone]  After 5pm on weekdays and all day on weekends - please call [phone]

## 2022-02-03 NOTE — PROGRESS NOTE ADULT - PROBLEM SELECTOR PLAN 3
? etiology  vanco empirically
? etiology  viral  cdiff ruled out
? etiology  viral  cdiff ruled out  GI follow up
? etiology  viral  cdiff ruled out
? etiology  vanco empirically
? etiology  viral  cdiff ruled out

## 2022-02-03 NOTE — PROGRESS NOTE ADULT - ASSESSMENT
diarrhea  r/o cdiff    Diarrhea   C. diff neg now off vanco   GI PCR noted  on probiotic   on questran   monitor frequency/quality   reg diet  imodium prn  us noted  [facility] wnl  will follow  dc planning as per primary team    I reviewed the overnight course of events on the unit, re-confirming the patient history. I discussed the care with the patient and their family  Differential diagnosis and plan of care discussed with patient after the evaluation  35 minutes spent on total encounter of which more than fifty percent of the encounter was spent counseling and/or coordinating care by the attending physician.  Advanced care planning was discussed with patient and family.  Advanced care planning forms were reviewed and discussed.  Risks, benefits and alternatives of gastroenterologic procedures were discussed in detail and all questions were answered.

## 2022-02-04 ENCOUNTER — TRANSCRIPTION ENCOUNTER (OUTPATIENT)
Age: 52
End: 2022-02-04

## 2022-02-04 VITALS — WEIGHT: 220.02 LBS

## 2022-02-04 PROCEDURE — 76705 ECHO EXAM OF ABDOMEN: CPT

## 2022-02-04 PROCEDURE — 87077 CULTURE AEROBIC IDENTIFY: CPT

## 2022-02-04 PROCEDURE — 80053 COMPREHEN METABOLIC PANEL: CPT

## 2022-02-04 PROCEDURE — 87635 SARS-COV-2 COVID-19 AMP PRB: CPT

## 2022-02-04 PROCEDURE — 83036 HEMOGLOBIN GLYCOSYLATED A1C: CPT

## 2022-02-04 PROCEDURE — 97161 PT EVAL LOW COMPLEX 20 MIN: CPT

## 2022-02-04 PROCEDURE — 84443 ASSAY THYROID STIM HORMONE: CPT

## 2022-02-04 PROCEDURE — 96375 TX/PRO/DX INJ NEW DRUG ADDON: CPT

## 2022-02-04 PROCEDURE — 80048 BASIC METABOLIC PNL TOTAL CA: CPT

## 2022-02-04 PROCEDURE — 71045 X-RAY EXAM CHEST 1 VIEW: CPT

## 2022-02-04 PROCEDURE — 84439 ASSAY OF FREE THYROXINE: CPT

## 2022-02-04 PROCEDURE — 97530 THERAPEUTIC ACTIVITIES: CPT

## 2022-02-04 PROCEDURE — G1004: CPT

## 2022-02-04 PROCEDURE — 87177 OVA AND PARASITES SMEARS: CPT

## 2022-02-04 PROCEDURE — 82962 GLUCOSE BLOOD TEST: CPT

## 2022-02-04 PROCEDURE — 87046 STOOL CULTR AEROBIC BACT EA: CPT

## 2022-02-04 PROCEDURE — 85027 COMPLETE CBC AUTOMATED: CPT

## 2022-02-04 PROCEDURE — 83690 ASSAY OF LIPASE: CPT

## 2022-02-04 PROCEDURE — 81001 URINALYSIS AUTO W/SCOPE: CPT

## 2022-02-04 PROCEDURE — 36415 COLL VENOUS BLD VENIPUNCTURE: CPT

## 2022-02-04 PROCEDURE — 82803 BLOOD GASES ANY COMBINATION: CPT

## 2022-02-04 PROCEDURE — 99231 SBSQ HOSP IP/OBS SF/LOW 25: CPT

## 2022-02-04 PROCEDURE — 83605 ASSAY OF LACTIC ACID: CPT

## 2022-02-04 PROCEDURE — 85025 COMPLETE CBC W/AUTO DIFF WBC: CPT

## 2022-02-04 PROCEDURE — 87040 BLOOD CULTURE FOR BACTERIA: CPT

## 2022-02-04 PROCEDURE — 84481 FREE ASSAY (FT-3): CPT

## 2022-02-04 PROCEDURE — 87493 C DIFF AMPLIFIED PROBE: CPT

## 2022-02-04 PROCEDURE — U0005: CPT

## 2022-02-04 PROCEDURE — 74177 CT ABD & PELVIS W/CONTRAST: CPT | Mod: ME

## 2022-02-04 PROCEDURE — 80076 HEPATIC FUNCTION PANEL: CPT

## 2022-02-04 PROCEDURE — 87045 FECES CULTURE AEROBIC BACT: CPT

## 2022-02-04 PROCEDURE — 96374 THER/PROPH/DIAG INJ IV PUSH: CPT | Mod: XU

## 2022-02-04 PROCEDURE — U0003: CPT

## 2022-02-04 PROCEDURE — 97165 OT EVAL LOW COMPLEX 30 MIN: CPT

## 2022-02-04 PROCEDURE — 87507 IADNA-DNA/RNA PROBE TQ 12-25: CPT

## 2022-02-04 PROCEDURE — 97116 GAIT TRAINING THERAPY: CPT

## 2022-02-04 PROCEDURE — 99285 EMERGENCY DEPT VISIT HI MDM: CPT

## 2022-02-04 RX ADMIN — Medication 1 TABLET(S): at 05:17

## 2022-02-04 RX ADMIN — Medication 1 TABLET(S): at 12:17

## 2022-02-04 RX ADMIN — APIXABAN 5 MILLIGRAM(S): 2.5 TABLET, FILM COATED ORAL at 05:17

## 2022-02-04 RX ADMIN — FAMOTIDINE 20 MILLIGRAM(S): 10 INJECTION INTRAVENOUS at 12:17

## 2022-02-04 RX ADMIN — LOSARTAN POTASSIUM 25 MILLIGRAM(S): 100 TABLET, FILM COATED ORAL at 05:17

## 2022-02-04 RX ADMIN — Medication 2: at 12:17

## 2022-02-04 RX ADMIN — Medication 25 MILLIGRAM(S): at 13:19

## 2022-02-04 RX ADMIN — Medication 1: at 08:07

## 2022-02-04 RX ADMIN — Medication 1: at 16:54

## 2022-02-04 RX ADMIN — ISOSORBIDE MONONITRATE 30 MILLIGRAM(S): 60 TABLET, EXTENDED RELEASE ORAL at 12:27

## 2022-02-04 RX ADMIN — Medication 81 MILLIGRAM(S): at 12:17

## 2022-02-04 RX ADMIN — Medication 25 MILLIGRAM(S): at 05:17

## 2022-02-04 RX ADMIN — Medication 1 TABLET(S): at 13:19

## 2022-02-04 RX ADMIN — Medication 250 MILLIGRAM(S): at 05:17

## 2022-02-04 RX ADMIN — GABAPENTIN 100 MILLIGRAM(S): 400 CAPSULE ORAL at 12:27

## 2022-02-04 RX ADMIN — Medication 50 MILLIGRAM(S): at 05:17

## 2022-02-04 NOTE — PROGRESS NOTE ADULT - REASON FOR ADMISSION
Abdominal pain, nausea, vomiting, diarrhea and fever
diarrhea
Abdominal pain, nausea, vomiting, diarrhea and fever
abd pain
Abdominal pain, nausea, vomiting, diarrhea and fever

## 2022-02-04 NOTE — PROGRESS NOTE ADULT - PROVIDER SPECIALTY LIST ADULT
Endocrinology
Gastroenterology
Hospitalist
Infectious Disease
Surgery
Family Medicine
Gastroenterology
Surgery
Surgery
Gastroenterology
Gastroenterology
Infectious Disease
Infectious Disease
Surgery
Endocrinology
Endocrinology
Gastroenterology
Gastroenterology
Hospitalist
Infectious Disease
Infectious Disease
Family Medicine
Surgery
Family Medicine
Family Medicine

## 2022-02-04 NOTE — PROGRESS NOTE ADULT - SUBJECTIVE AND OBJECTIVE BOX
CAPILLARY BLOOD GLUCOSE      POCT Blood Glucose.: 135 mg/dL (03 Feb 2022 22:06)  POCT Blood Glucose.: 173 mg/dL (03 Feb 2022 16:48)  POCT Blood Glucose.: 140 mg/dL (03 Feb 2022 12:24)  POCT Blood Glucose.: 159 mg/dL (03 Feb 2022 07:51)      Vital Signs Last 24 Hrs  T(C): 36.7 (04 Feb 2022 04:50), Max: 36.7 (04 Feb 2022 04:50)  T(F): 98.1 (04 Feb 2022 04:50), Max: 98.1 (04 Feb 2022 04:50)  HR: 60 (04 Feb 2022 04:50) (60 - 73)  BP: 120/69 (04 Feb 2022 04:50) (120/69 - 144/68)  BP(mean): --  RR: 18 (04 Feb 2022 04:50) (16 - 18)  SpO2: 93% (04 Feb 2022 04:50) (93% - 97%)    General: WN/WD NAD  Respiratory: CTA B/L  CV: RRR, S1S2, no murmurs, rubs or gallops  Abdominal: Soft, NT, ND +BS, Last BM  Extremities: No edema, + peripheral pulses     02-03    140  |  108  |  12  ----------------------------<  176<H>  4.1   |  26  |  0.96    Ca    9.0      03 Feb 2022 11:05        atorvastatin 40 milliGRAM(s) Oral at bedtime  dextrose 40% Gel 15 Gram(s) Oral once  dextrose 50% Injectable 25 Gram(s) IV Push once  dextrose 50% Injectable 12.5 Gram(s) IV Push once  dextrose 50% Injectable 25 Gram(s) IV Push once  glucagon  Injectable 1 milliGRAM(s) IntraMuscular once  insulin glargine Injectable (LANTUS) 10 Unit(s) SubCutaneous at bedtime  insulin lispro (ADMELOG) corrective regimen sliding scale   SubCutaneous three times a day before meals  insulin lispro (ADMELOG) corrective regimen sliding scale   SubCutaneous at bedtime  methimazole 10 milliGRAM(s) Oral daily  
[facility], Division of Infectious Diseases  DELGADO Rodriguez Y. Patel, S. Shah, G. Casimir  [phone]  ([phone] - weekdays after 5pm and weekends)    Name: SARAH MORRISON  Age/Gender: 51y Male  MRN: [id number]    Interval History:  Patient seen this morning, feels better.   1 BM yesterday and 1 today, "muddy" consistency.   Tolerating diet without issues.   Notes reviewed  Afebrile     Allergies: fish (Hives)  No Known Drug Allergies    Objective:  Vitals:   T(F): 97.8 (02-02-22 @ 05:00), Max: 98.7 (02-01-22 @ 20:29)  HR: 70 (02-02-22 @ 08:47) (50 - 70)  BP: 134/77 (02-02-22 @ 08:47) (132/76 - 137/67)  RR: 18 (02-02-22 @ 05:00) (18 - 19)  SpO2: 94% (02-02-22 @ 05:00) (93% - 94%)  Physical Examination:  General: no acute distress  HEENT: NC/AT, anicteric, neck supple  Respiratory: no acc muscle use, breathing comfortably  Cardiovascular: S1 and S2 present  Gastrointestinal: normal appearing, nondistended  Extremities: no edema, no cyanosis  Skin: no visible rash    Laboratory Studies:  CBC:                       12.2   3.78  )-----------( 169      ( 01 Feb 2022 09:39 )             36.7     WBC Trend:  3.78 02-01-22 @ 09:39  3.04 01-31-22 @ 09:40  9.53 01-28-22 @ 16:44    CMP: 02-01    144  |  115<H>  |  8   ----------------------------<  114<H>  3.3<L>   |  25  |  0.96    Ca    8.4<L>      01 Feb 2022 09:39    Microbiology: reviewed   GI PCR Panel, Stool (collected 01-30-22 @ 00:40)  Source: .Stool Feces  Final Report (01-30-22 @ 13:13):    Rotavirus A    DETECTED by PCR    *******Please Note:*******    GI panel PCR evaluates for:    Campylobacter, Plesiomonas shigelloides, Salmonella,    Vibrio, Yersinia enterocolitica, Enteroaggregative    Escherichia coli (EAEC), Enteropathogenic E.coli (EPEC),    Enterotoxigenic E. coli (ETEC) lt/st, Shiga-like    toxin-producing E. coli (STEC) stx1/stx2,    Shigella/ Enteroinvasive E. coli (EIEC), Cryptosporidium,    Cyclospora cayetanensis, Entamoeba histolytica,    Giardia lamblia, Adenovirus F 40/41, Astrovirus,    Norovirus GI/GII, Rotavirus A, Sapovirus    Culture - Stool (collected 01-30-22 @ 00:40)  Source: .Stool Feces  Final Report (02-01-22 @ 10:09):    No enteric pathogens isolated.    (Stool culture examined for Salmonella,    Shigella, Campylobacter, Aeromonas, Plesiomonas,    Vibrio, E.coli O157 and Yersinia)    Culture - Blood (collected 01-28-22 @ 20:59)  Source: .Blood Blood-Peripheral  Preliminary Report (01-29-22 @ 21:01):    No growth to date.    Culture - Blood (collected 01-28-22 @ 20:59)  Source: .Blood Blood-Peripheral  Preliminary Report (01-29-22 @ 21:01):    No growth to date.    Radiology: reviewed     Medications:  acetaminophen     Tablet .. 650 milliGRAM(s) Oral every 6 hours PRN  apixaban 5 milliGRAM(s) Oral two times a day  aspirin  chewable 81 milliGRAM(s) Oral daily  atorvastatin 40 milliGRAM(s) Oral at bedtime  bethanechol 25 milliGRAM(s) Oral three times a day  cholestyramine Powder (Sugar-Free) 4 Gram(s) Oral daily  dextrose 40% Gel 15 Gram(s) Oral once  dextrose 50% Injectable 25 Gram(s) IV Push once  dextrose 50% Injectable 12.5 Gram(s) IV Push once  dextrose 50% Injectable 25 Gram(s) IV Push once  famotidine    Tablet 20 milliGRAM(s) Oral daily  gabapentin 100 milliGRAM(s) Oral daily  glucagon  Injectable 1 milliGRAM(s) IntraMuscular once  insulin glargine Injectable (LANTUS) 10 Unit(s) SubCutaneous at bedtime  insulin lispro (ADMELOG) corrective regimen sliding scale   SubCutaneous three times a day before meals  insulin lispro (ADMELOG) corrective regimen sliding scale   SubCutaneous at bedtime  isosorbide   mononitrate ER Tablet (IMDUR) 30 milliGRAM(s) Oral daily  lactobacillus acidophilus 1 Tablet(s) Oral three times a day  loperamide 2 milliGRAM(s) Oral four times a day PRN  losartan 25 milliGRAM(s) Oral daily  methimazole 10 milliGRAM(s) Oral daily  metoprolol succinate ER 50 milliGRAM(s) Oral daily  multivitamin 1 Tablet(s) Oral daily  ondansetron Injectable 4 milliGRAM(s) IV Push four times a day PRN  saccharomyces boulardii 250 milliGRAM(s) Oral two times a day  tamsulosin 0.4 milliGRAM(s) Oral at bedtime    Antimicrobials:  
[facility]  Tor Car PA-C  [facility] [place] [address]  [phone]      INTERVAL HPI/OVERNIGHT EVENTS:  Seen and examined  CDiff neg, GI pcr noted  still w/ complaints of diarrhea       MEDICATIONS  (STANDING):  apixaban 5 milliGRAM(s) Oral two times a day  aspirin  chewable 81 milliGRAM(s) Oral daily  atorvastatin 40 milliGRAM(s) Oral at bedtime  bethanechol 25 milliGRAM(s) Oral three times a day  cholestyramine Powder (Sugar-Free) 4 Gram(s) Oral daily  dextrose 40% Gel 15 Gram(s) Oral once  dextrose 50% Injectable 25 Gram(s) IV Push once  dextrose 50% Injectable 12.5 Gram(s) IV Push once  dextrose 50% Injectable 25 Gram(s) IV Push once  famotidine    Tablet 20 milliGRAM(s) Oral daily  gabapentin 100 milliGRAM(s) Oral daily  glucagon  Injectable 1 milliGRAM(s) IntraMuscular once  insulin glargine Injectable (LANTUS) 10 Unit(s) SubCutaneous two times a day  insulin lispro (ADMELOG) corrective regimen sliding scale   SubCutaneous three times a day before meals  insulin lispro (ADMELOG) corrective regimen sliding scale   SubCutaneous at bedtime  isosorbide   mononitrate ER Tablet (IMDUR) 30 milliGRAM(s) Oral daily  lactobacillus acidophilus 1 Tablet(s) Oral three times a day  losartan 25 milliGRAM(s) Oral daily  methimazole 10 milliGRAM(s) Oral daily  metoprolol succinate ER 50 milliGRAM(s) Oral daily  multivitamin 1 Tablet(s) Oral daily  saccharomyces boulardii 250 milliGRAM(s) Oral two times a day  tamsulosin 0.4 milliGRAM(s) Oral at bedtime    MEDICATIONS  (PRN):  acetaminophen     Tablet .. 650 milliGRAM(s) Oral every 6 hours PRN Temp greater or equal to 38C (100.4F)  morphine  - Injectable 2 milliGRAM(s) IV Push every 6 hours PRN Severe Pain (7 - 10)  ondansetron Injectable 4 milliGRAM(s) IV Push four times a day PRN Nausea and/or Vomiting      Allergies    fish (Hives)  No Known Drug Allergies    Intolerances          PHYSICAL EXAM:   Vital Signs:  Vital Signs Last 24 Hrs  T(C): 36.8 (2022 05:06), Max: 37.9 (2022 17:32)  T(F): 98.3 (2022 05:06), Max: 100.2 (2022 17:32)  HR: 91 (2022 05:06) (81 - 91)  BP: 142/92 (2022 05:06) (127/76 - 171/82)  BP(mean): --  RR: 16 (2022 05:06) (15 - 16)  SpO2: 94% (2022 05:06) (93% - 98%)  Daily     Daily     GENERAL:  Appears stated age,   HEENT:  NC/AT,    CHEST:  Full & symmetric excursion,   HEART:  Regular rhythm,  ABDOMEN:  Soft, non-tender, non-distended,  EXTEREMITIES:  no cyanosis  SKIN:  No rash  NEURO:  Alert,       LABS:                        16.4   9.53  )-----------( 214      ( 2022 16:44 )             48.7         137  |  105  |  22  ----------------------------<  186<H>  4.2   |  25  |  1.20    Ca    9.2      2022 16:44    TPro  8.4<H>  /  Alb  3.8  /  TBili  0.5  /  DBili  x   /  AST  18  /  ALT  26  /  AlkPhos  134<H>        Urinalysis Basic - ( 2022 16:44 )    Color: Yellow / Appearance: Slightly Turbid / S.015 / pH: x  Gluc: x / Ketone: Trace  / Bili: Small / Urobili: Negative   Blood: x / Protein: 30 mg/dL / Nitrite: Negative   Leuk Esterase: Moderate / RBC: 11-25 /HPF / WBC 11-25   Sq Epi: x / Non Sq Epi: Occasional / Bacteria: Moderate        RADIOLOGY & ADDITIONAL TESTS:  
[facility]  Tor Car PA-C  [facility] [place] [address]  [phone]      INTERVAL HPI/OVERNIGHT EVENTS:  Pt s/e  Pt reports improved diarrhea and abdominal pain  Denies further GI complaints    MEDICATIONS  (STANDING):  apixaban 5 milliGRAM(s) Oral two times a day  aspirin  chewable 81 milliGRAM(s) Oral daily  atorvastatin 40 milliGRAM(s) Oral at bedtime  bethanechol 25 milliGRAM(s) Oral three times a day  dextrose 40% Gel 15 Gram(s) Oral once  dextrose 50% Injectable 25 Gram(s) IV Push once  dextrose 50% Injectable 12.5 Gram(s) IV Push once  dextrose 50% Injectable 25 Gram(s) IV Push once  famotidine    Tablet 20 milliGRAM(s) Oral daily  gabapentin 100 milliGRAM(s) Oral daily  glucagon  Injectable 1 milliGRAM(s) IntraMuscular once  insulin glargine Injectable (LANTUS) 10 Unit(s) SubCutaneous at bedtime  insulin lispro (ADMELOG) corrective regimen sliding scale   SubCutaneous three times a day before meals  insulin lispro (ADMELOG) corrective regimen sliding scale   SubCutaneous at bedtime  isosorbide   mononitrate ER Tablet (IMDUR) 30 milliGRAM(s) Oral daily  lactobacillus acidophilus 1 Tablet(s) Oral three times a day  losartan 25 milliGRAM(s) Oral daily  methimazole 10 milliGRAM(s) Oral daily  metoprolol succinate ER 50 milliGRAM(s) Oral daily  multivitamin 1 Tablet(s) Oral daily  saccharomyces boulardii 250 milliGRAM(s) Oral two times a day  tamsulosin 0.4 milliGRAM(s) Oral at bedtime    MEDICATIONS  (PRN):  acetaminophen     Tablet .. 650 milliGRAM(s) Oral every 6 hours PRN Temp greater or equal to 38C (100.4F)  loperamide 2 milliGRAM(s) Oral four times a day PRN Diarrhea  ondansetron Injectable 4 milliGRAM(s) IV Push four times a day PRN Nausea and/or Vomiting      Allergies    fish (Hives)  No Known Drug Allergies    PHYSICAL EXAM:   Vital Signs:  Vital Signs Last 24 Hrs  T(C): 36.7 (04 Feb 2022 04:50), Max: 36.7 (04 Feb 2022 04:50)  T(F): 98.1 (04 Feb 2022 04:50), Max: 98.1 (04 Feb 2022 04:50)  HR: 60 (04 Feb 2022 04:50) (60 - 73)  BP: 120/69 (04 Feb 2022 04:50) (120/69 - 144/68)  BP(mean): --  RR: 18 (04 Feb 2022 04:50) (16 - 18)  SpO2: 93% (04 Feb 2022 04:50) (93% - 97%)  Daily     Daily     GENERAL:  Appears stated age,   HEENT:  NC/AT,    CHEST:  Full & symmetric excursion,   HEART:  Regular rhythm,  ABDOMEN:  Soft, non-tender, non-distended,  EXTEREMITIES:  no cyanosis  SKIN:  No rash  NEURO:  Alert,       LABS:                        13.7   7.97  )-----------( 219      ( 03 Feb 2022 11:05 )             39.4     02-03    140  |  108  |  12  ----------------------------<  176<H>  4.1   |  26  |  0.96    Ca    9.0      03 Feb 2022 11:05      
[facility]  Tor Car PA-C  [place] [address]  [phone]      INTERVAL HPI/OVERNIGHT EVENTS:  Seen and examined  CDiff neg, GI pcr noted  feels better      MEDICATIONS  (STANDING):  apixaban 5 milliGRAM(s) Oral two times a day  aspirin  chewable 81 milliGRAM(s) Oral daily  atorvastatin 40 milliGRAM(s) Oral at bedtime  bethanechol 25 milliGRAM(s) Oral three times a day  cholestyramine Powder (Sugar-Free) 4 Gram(s) Oral daily  dextrose 40% Gel 15 Gram(s) Oral once  dextrose 50% Injectable 25 Gram(s) IV Push once  dextrose 50% Injectable 12.5 Gram(s) IV Push once  dextrose 50% Injectable 25 Gram(s) IV Push once  famotidine    Tablet 20 milliGRAM(s) Oral daily  gabapentin 100 milliGRAM(s) Oral daily  glucagon  Injectable 1 milliGRAM(s) IntraMuscular once  insulin glargine Injectable (LANTUS) 10 Unit(s) SubCutaneous two times a day  insulin lispro (ADMELOG) corrective regimen sliding scale   SubCutaneous three times a day before meals  insulin lispro (ADMELOG) corrective regimen sliding scale   SubCutaneous at bedtime  isosorbide   mononitrate ER Tablet (IMDUR) 30 milliGRAM(s) Oral daily  lactobacillus acidophilus 1 Tablet(s) Oral three times a day  losartan 25 milliGRAM(s) Oral daily  methimazole 10 milliGRAM(s) Oral daily  metoprolol succinate ER 50 milliGRAM(s) Oral daily  multivitamin 1 Tablet(s) Oral daily  saccharomyces boulardii 250 milliGRAM(s) Oral two times a day  tamsulosin 0.4 milliGRAM(s) Oral at bedtime    MEDICATIONS  (PRN):  acetaminophen     Tablet .. 650 milliGRAM(s) Oral every 6 hours PRN Temp greater or equal to 38C (100.4F)  morphine  - Injectable 2 milliGRAM(s) IV Push every 6 hours PRN Severe Pain (7 - 10)  ondansetron Injectable 4 milliGRAM(s) IV Push four times a day PRN Nausea and/or Vomiting      Allergies    fish (Hives)  No Known Drug Allergies    Intolerances          PHYSICAL EXAM:   Vital Signs:  Vital Signs Last 24 Hrs  T(C): 36.8 (2022 05:06), Max: 37.9 (2022 17:32)  T(F): 98.3 (2022 05:06), Max: 100.2 (2022 17:32)  HR: 91 (2022 05:06) (81 - 91)  BP: 142/92 (2022 05:06) (127/76 - 171/82)  BP(mean): --  RR: 16 (2022 05:06) (15 - 16)  SpO2: 94% (2022 05:06) (93% - 98%)  Daily     Daily     GENERAL:  Appears stated age,   HEENT:  NC/AT,    CHEST:  Full & symmetric excursion,   HEART:  Regular rhythm,  ABDOMEN:  Soft, non-tender, non-distended,  EXTEREMITIES:  no cyanosis  SKIN:  No rash  NEURO:  Alert,       LABS:                        16.4   9.53  )-----------( 214      ( 2022 16:44 )             48.7         137  |  105  |  22  ----------------------------<  186<H>  4.2   |  25  |  1.20    Ca    9.2      2022 16:44    TPro  8.4<H>  /  Alb  3.8  /  TBili  0.5  /  DBili  x   /  AST  18  /  ALT  26  /  AlkPhos  134<H>        Urinalysis Basic - ( 2022 16:44 )    Color: Yellow / Appearance: Slightly Turbid / S.015 / pH: x  Gluc: x / Ketone: Trace  / Bili: Small / Urobili: Negative   Blood: x / Protein: 30 mg/dL / Nitrite: Negative   Leuk Esterase: Moderate / RBC: 11-25 /HPF / WBC 11-25   Sq Epi: x / Non Sq Epi: Occasional / Bacteria: Moderate        RADIOLOGY & ADDITIONAL TESTS:  
Patient is a 51y old  Male who presents with a chief complaint of Abdominal pain, nausea, vomiting, diarrhea and fever (01 Feb 2022 15:49)      INTERVAL /OVERNIGHT EVENTS: still with some diarhea    MEDICATIONS  (STANDING):  apixaban 5 milliGRAM(s) Oral two times a day  aspirin  chewable 81 milliGRAM(s) Oral daily  atorvastatin 40 milliGRAM(s) Oral at bedtime  bethanechol 25 milliGRAM(s) Oral three times a day  cholestyramine Powder (Sugar-Free) 4 Gram(s) Oral daily  dextrose 40% Gel 15 Gram(s) Oral once  dextrose 50% Injectable 25 Gram(s) IV Push once  dextrose 50% Injectable 12.5 Gram(s) IV Push once  dextrose 50% Injectable 25 Gram(s) IV Push once  famotidine    Tablet 20 milliGRAM(s) Oral daily  gabapentin 100 milliGRAM(s) Oral daily  glucagon  Injectable 1 milliGRAM(s) IntraMuscular once  insulin glargine Injectable (LANTUS) 10 Unit(s) SubCutaneous at bedtime  insulin lispro (ADMELOG) corrective regimen sliding scale   SubCutaneous three times a day before meals  insulin lispro (ADMELOG) corrective regimen sliding scale   SubCutaneous at bedtime  isosorbide   mononitrate ER Tablet (IMDUR) 30 milliGRAM(s) Oral daily  lactobacillus acidophilus 1 Tablet(s) Oral three times a day  losartan 25 milliGRAM(s) Oral daily  methimazole 10 milliGRAM(s) Oral daily  metoprolol succinate ER 50 milliGRAM(s) Oral daily  multivitamin 1 Tablet(s) Oral daily  potassium chloride    Tablet ER 40 milliEquivalent(s) Oral every 4 hours  saccharomyces boulardii 250 milliGRAM(s) Oral two times a day  tamsulosin 0.4 milliGRAM(s) Oral at bedtime    MEDICATIONS  (PRN):  acetaminophen     Tablet .. 650 milliGRAM(s) Oral every 6 hours PRN Temp greater or equal to 38C (100.4F)  loperamide 2 milliGRAM(s) Oral four times a day PRN Diarrhea  ondansetron Injectable 4 milliGRAM(s) IV Push four times a day PRN Nausea and/or Vomiting      Allergies    fish (Hives)  No Known Drug Allergies    Intolerances        REVIEW OF SYSTEMS:  CONSTITUTIONAL: No fever, weight loss, or fatigue  EYES: No eye pain, visual disturbances, or discharge  ENMT:  No difficulty hearing, tinnitus, vertigo; No sinus or throat pain  NECK: No pain or stiffness  RESPIRATORY: No cough, wheezing, chills or hemoptysis; No shortness of breath  CARDIOVASCULAR: No chest pain, palpitations, dizziness, or leg swelling  GASTROINTESTINAL: No abdominal or epigastric pain. No nausea, vomiting, or hematemesis; + diarrhea   GENITOURINARY: No dysuria, frequency, hematuria, or incontinence  NEUROLOGICAL: No headaches, memory loss, loss of strength, numbness, or tremors  SKIN: No itching, burning, rashes, or lesions   LYMPH NODES: No enlarged glands  ENDOCRINE: No heat or cold intolerance; No hair loss; No polydipsia or polyuria  MUSCULOSKELETAL: No joint pain or swelling; No muscle, back, or extremity pain  PSYCHIATRIC: No depression, anxiety, mood swings, or difficulty sleeping  HEME/LYMPH: No easy bruising, or bleeding gums  ALLERGY AND IMMUNOLOGIC: No hives or eczema    Vital Signs Last 24 Hrs  T(C): 36.9 (01 Feb 2022 13:22), Max: 36.9 (01 Feb 2022 13:22)  T(F): 98.5 (01 Feb 2022 13:22), Max: 98.5 (01 Feb 2022 13:22)  HR: 50 (01 Feb 2022 13:22) (50 - 68)  BP: 137/67 (01 Feb 2022 13:22) (126/70 - 137/76)  BP(mean): --  RR: 19 (01 Feb 2022 13:22) (17 - 19)  SpO2: 94% (01 Feb 2022 13:22) (94% - 95%)    PHYSICAL EXAM:  GENERAL: NAD, well-groomed, well-developed  HEAD:  Atraumatic, Normocephalic  EYES: EOMI, PERRLA, conjunctiva and sclera clear  ENMT: No tonsillar erythema, exudates, or enlargement; Moist mucous membranes, Good dentition, No lesions  NECK: Supple, No JVD, Normal thyroid  NERVOUS SYSTEM:  Alert & Oriented X3, Good concentration; Motor Strength 5/5 B/L upper and lower extremities; DTRs 2+ intact and symmetric  CHEST/LUNG: Clear to auscultation bilaterally; No rales, rhonchi, wheezing, or rubs  HEART: Regular rate and rhythm; No murmurs, rubs, or gallops  ABDOMEN: Soft, Nontender, Nondistended; Bowel sounds present  EXTREMITIES:  2+ Peripheral Pulses, No clubbing, cyanosis, or edema  LYMPH: No lymphadenopathy noted  SKIN: No rashes or lesions    LABS:                        12.2   3.78  )-----------( 169      ( 01 Feb 2022 09:39 )             36.7     01 Feb 2022 09:39    144    |  115    |  8      ----------------------------<  114    3.3     |  25     |  0.96     Ca    8.4        01 Feb 2022 09:39          CAPILLARY BLOOD GLUCOSE      POCT Blood Glucose.: 136 mg/dL (01 Feb 2022 16:38)  POCT Blood Glucose.: 111 mg/dL (01 Feb 2022 11:53)  POCT Blood Glucose.: 122 mg/dL (01 Feb 2022 08:03)  POCT Blood Glucose.: 168 mg/dL (31 Jan 2022 21:57)      RADIOLOGY & ADDITIONAL TESTS:    Notes Reviewed:  [x ] YES  [ ] NO    Care Discussed with Consultants/Other Providers [x ] YES  [ ] NO
Patient is a 51y old  Male who presents with a chief complaint of r/o cdiff (2022 15:42)      INTERVAL /OVERNIGHT EVENTS: still with diarhea    MEDICATIONS  (STANDING):  apixaban 5 milliGRAM(s) Oral two times a day  aspirin  chewable 81 milliGRAM(s) Oral daily  atorvastatin 40 milliGRAM(s) Oral at bedtime  bethanechol 25 milliGRAM(s) Oral three times a day  cholestyramine Powder (Sugar-Free) 4 Gram(s) Oral daily  dextrose 40% Gel 15 Gram(s) Oral once  dextrose 50% Injectable 25 Gram(s) IV Push once  dextrose 50% Injectable 12.5 Gram(s) IV Push once  dextrose 50% Injectable 25 Gram(s) IV Push once  famotidine    Tablet 20 milliGRAM(s) Oral daily  gabapentin 100 milliGRAM(s) Oral daily  glucagon  Injectable 1 milliGRAM(s) IntraMuscular once  insulin glargine Injectable (LANTUS) 10 Unit(s) SubCutaneous two times a day  insulin lispro (ADMELOG) corrective regimen sliding scale   SubCutaneous three times a day before meals  insulin lispro (ADMELOG) corrective regimen sliding scale   SubCutaneous at bedtime  isosorbide   mononitrate ER Tablet (IMDUR) 30 milliGRAM(s) Oral daily  lactobacillus acidophilus 1 Tablet(s) Oral three times a day  losartan 25 milliGRAM(s) Oral daily  methimazole 10 milliGRAM(s) Oral daily  metoprolol succinate ER 50 milliGRAM(s) Oral daily  multivitamin 1 Tablet(s) Oral daily  tamsulosin 0.4 milliGRAM(s) Oral at bedtime  vancomycin    Solution 125 milliGRAM(s) Oral every 6 hours    MEDICATIONS  (PRN):  acetaminophen     Tablet .. 650 milliGRAM(s) Oral every 6 hours PRN Temp greater or equal to 38C (100.4F)  morphine  - Injectable 2 milliGRAM(s) IV Push every 6 hours PRN Severe Pain (7 - 10)  ondansetron Injectable 4 milliGRAM(s) IV Push four times a day PRN Nausea and/or Vomiting      Allergies    fish (Hives)  No Known Drug Allergies    Intolerances        REVIEW OF SYSTEMS:  CONSTITUTIONAL: No fever, weight loss, or fatigue  EYES: No eye pain, visual disturbances, or discharge  ENMT:  No difficulty hearing, tinnitus, vertigo; No sinus or throat pain  NECK: No pain or stiffness  RESPIRATORY: No cough, wheezing, chills or hemoptysis; No shortness of breath  CARDIOVASCULAR: No chest pain, palpitations, dizziness, or leg swelling  GASTROINTESTINAL: + abdominal or epigastric pain. No nausea, vomiting, or hematemesis; + diarrhea or constipation. No melena or hematochezia.  GENITOURINARY: No dysuria, frequency, hematuria, or incontinence  NEUROLOGICAL: No headaches, memory loss, loss of strength, numbness, or tremors  SKIN: No itching, burning, rashes, or lesions   LYMPH NODES: No enlarged glands  ENDOCRINE: No heat or cold intolerance; No hair loss; No polydipsia or polyuria  MUSCULOSKELETAL: No joint pain or swelling; No muscle, back, or extremity pain  PSYCHIATRIC: No depression, anxiety, mood swings, or difficulty sleeping  HEME/LYMPH: No easy bruising, or bleeding gums  ALLERGY AND IMMUNOLOGIC: No hives or eczema    Vital Signs Last 24 Hrs  T(C): 37.9 (2022 17:32), Max: 37.9 (2022 17:32)  T(F): 100.2 (2022 17:32), Max: 100.2 (2022 17:32)  HR: 88 (2022 17:32) (88 - 97)  BP: 130/67 (2022 17:32) (112/61 - 130/67)  BP(mean): --  RR: 16 (2022 17:32) (15 - 18)  SpO2: 98% (2022 17:32) (97% - 98%)    PHYSICAL EXAM:  GENERAL: NAD, well-groomed, well-developed  HEAD:  Atraumatic, Normocephalic  EYES: EOMI, PERRLA, conjunctiva and sclera clear  ENMT: No tonsillar erythema, exudates, or enlargement; Moist mucous membranes, Good dentition, No lesions  NECK: Supple, No JVD, Normal thyroid  NERVOUS SYSTEM:  Alert & Oriented X3, Good concentration; Motor Strength 5/5 B/L upper and lower extremities; DTRs 2+ intact and symmetric  CHEST/LUNG: Clear to auscultation bilaterally; No rales, rhonchi, wheezing, or rubs  HEART: Regular rate and rhythm; No murmurs, rubs, or gallops  ABDOMEN: Soft, Nontender, Nondistended; Bowel sounds present  EXTREMITIES:  2+ Peripheral Pulses, No clubbing, cyanosis, or edema  LYMPH: No lymphadenopathy noted  SKIN: No rashes or lesions    LABS:      Ca    9.2        2022 16:44        Urinalysis Basic - ( 2022 16:44 )    Color: Yellow / Appearance: Slightly Turbid / S.015 / pH: x  Gluc: x / Ketone: Trace  / Bili: Small / Urobili: Negative   Blood: x / Protein: 30 mg/dL / Nitrite: Negative   Leuk Esterase: Moderate / RBC: 11-25 /HPF / WBC 11-25   Sq Epi: x / Non Sq Epi: Occasional / Bacteria: Moderate      CAPILLARY BLOOD GLUCOSE      POCT Blood Glucose.: 144 mg/dL (2022 17:24)  POCT Blood Glucose.: 149 mg/dL (2022 12:20)  POCT Blood Glucose.: 167 mg/dL (2022 09:33)  POCT Blood Glucose.: 164 mg/dL (2022 22:18)      RADIOLOGY & ADDITIONAL TESTS:    Notes Reviewed:  [x ] YES  [ ] NO    Care Discussed with Consultants/Other Providers [x ] YES  [ ] NO
SUBJECTIVE:  Patient seen and examined at bedside.  No overnight events.  Patient reports 3 episodes of nonbloody diarrhea today.  Tolerating clear liquids.  T100.7F yesterday afternoon, afebrile since.    VITALS  Vital Signs Last 24 Hrs  T(C): 37.2 (28 Jan 2022 20:05), Max: 38.2 (28 Jan 2022 15:49)  T(F): 98.9 (28 Jan 2022 20:05), Max: 100.7 (28 Jan 2022 15:49)  HR: 90 (29 Jan 2022 09:37) (90 - 112)  BP: 112/61 (29 Jan 2022 09:37) (112/61 - 124/61)  RR: 15 (29 Jan 2022 09:37) (15 - 18)  SpO2: 98% (29 Jan 2022 09:37) (97% - 98%)    PHYSICAL EXAM  GENERAL:  Well-nourished, well-developed male lying comfortably in bed in NAD.  HEENT:  Sclera white. Mucous membranes moist.  ABDOMEN:  Soft, nondistended, nontender; No rebound tenderness or guarding.  : Aguilera in place  SKIN:  No jaundice, pallor, or cyanosis  NEURO:  A&O x 3    LABS:                   16.4   9.53  )-----------( 214      ( 28 Jan 2022 16:44 )             48.7     01-28    137  |  105  |  22  ----------------------------<  186<H>  4.2   |  25  |  1.20    Ca    9.2      28 Jan 2022 16:44    TPro  8.4<H>  /  Alb  3.8  /  TBili  0.5  /  DBili  x   /  AST  18  /  ALT  26  /  AlkPhos  134<H>  01-28    RADIOLOGY:  < from: CT Abdomen and Pelvis w/ IV Cont (01.28.22 @ 19:00) >  FINDINGS:  LOWER CHEST: Within normal limits.    LIVER: Within normal limits.  BILE DUCTS: Normal caliber.  GALLBLADDER: Within normal limits.  SPLEEN: Within normal limits.  PANCREAS: Mildly atrophic and fatty replaced.  ADRENALS: Within normal limits.  KIDNEYS/URETERS: Within normal limits.    BLADDER: Aguilera catheter in the collapsed bladder.  REPRODUCTIVE ORGANS: The uterus and adnexa are unremarkable by CT   criteria.    BOWEL: The fluid-filled stomach is unremarkable. Scattered colonic   diverticula. There is mild concentric wall thickening and mucosal   hyperenhancement of the fluid-filled colon. There is no pneumatosis or   stranding of the mesenteric fat. These findings are compatible with mild   pancolitis, likely infectious.  PERITONEUM: No ascites.  VESSELS: Atherosclerotic disease of the nonaneurysmal abdominal aorta.  RETROPERITONEUM/LYMPH NODES: No lymphadenopathy.  ABDOMINAL WALL: Bilobed fat-containing umbilical hernia.  BONES: Healing left lateralrib fractures.    IMPRESSION: Mild pancolitis, likely infectious.
SURGERY  Spectralink [id number]    Pt seen and examined. Pt denies any overnight events. Pt reports having 6 episodes of loose, liquid BM's yesterday, none overnight until this am. Denies any abdominal pain, N/V. Pt reports passing flatus. Pt reports ambulating with assistance. Stool studies +Rotavirus.    ICU Vital Signs Last 24 Hrs  T(C): 36.7 (31 Jan 2022 05:24), Max: 37 (30 Jan 2022 21:52)  T(F): 98.1 (31 Jan 2022 05:24), Max: 98.6 (30 Jan 2022 21:52)  HR: 68 (31 Jan 2022 05:24) (62 - 75)  BP: 103/60 (31 Jan 2022 05:24) (103/60 - 158/79)  BP(mean): --  ABP: --  ABP(mean): --  RR: 17 (31 Jan 2022 05:24) (17 - 17)  SpO2: 95% (31 Jan 2022 05:24) (94% - 95%)    I&O's Detail    Physical exam: Pt sitting comfortably in bed in NAD  Chest- CTA bilaterally   CV- S1 & S2, RRR  Abdomen- Mild RUQ region ttp, well healed abdominal scars and large winston-umbilical hernia, soft, mild distension, no rebound, no guarding, no peritoneal signs.    LABS:    01-31    143  |  115<H>  |  8   ----------------------------<  115<H>  3.5   |  24  |  1.00    Ca    8.2<L>      31 Jan 2022 09:40    TPro  6.4  /  Alb  2.8<L>  /  TBili  0.2  /  DBili  <0.1  /  AST  78<H>  /  ALT  45  /  AlkPhos  81  01-31    GI PCR Panel, Stool (collected 30 Jan 2022 00:40)  Source: .Stool Feces  Final Report (30 Jan 2022 13:13):    Rotavirus A    DETECTED by PCR    *******Please Note:*******    GI panel PCR evaluates for:    Campylobacter, Plesiomonas shigelloides, Salmonella,    Vibrio, Yersinia enterocolitica, Enteroaggregative    Escherichia coli (EAEC), Enteropathogenic E.coli (EPEC),    Enterotoxigenic E. coli (ETEC) lt/st, Shiga-like    toxin-producing E. coli (STEC) stx1/stx2,    Shigella/ Enteroinvasive E. coli (EIEC), Cryptosporidium,    Cyclospora cayetanensis, Entamoeba histolytica,    Giardia lamblia, Adenovirus F 40/41, Astrovirus,    Norovirus GI/GII, Rotavirus A, Sapovirus    Culture - Blood (collected 28 Jan 2022 20:59)  Source: .Blood Blood-Peripheral  Preliminary Report (29 Jan 2022 21:01):    No growth to date.    Culture - Blood (collected 28 Jan 2022 20:59)  Source: .Blood Blood-Peripheral  Preliminary Report (29 Jan 2022 21:01):    No growth to date.    RADIOLOGY & ADDITIONAL STUDIES:    < from: US Abdomen Upper Quadrant Right (01.31.22 @ 08:21) >    ACC: [id number] EXAM:  US ABDOMEN RT UPR QUADRANT                          PROCEDURE DATE:  01/31/2022          INTERPRETATION:  CLINICAL INFORMATION: Epigastric abdominal tenderness    COMPARISON: Abdominal ultrasound dated 8/5/2019.    TECHNIQUE: Sonography of the right upper quadrant.    FINDINGS:    Liver: New mild hepatomegaly measuring 17.1 cm, increased from prior   measurement of 15.4 cm.  Bile ducts: Common bile duct measures 9 mm which is dilated..  Gallbladder: Distended gallbladder measuring 14.9 cm without evidence for   gallstones, thickened gallbladder wall, pericholecystic fluid or   ultrasonic Proctor's sign.  Pancreas: Visualized portions are within normal limits.  Right kidney: 12.0 cm. No hydronephrosis. Within normal limits.  Ascites: None.  IVC: Visualized portions are within normal limits.    IMPRESSION:    Dilated common bile duct. MRCP may be pursued for further evaluation.    Distended gallbladder without evidence for gallstones, thickened   gallbladder wall, pericholecystic fluid or ultrasonic Proctor's sign.    --- End of Report ---      JAIMIE DENNIS MD; Attending Radiologist  This document has been electronically signed. Jan 31 2022  8:43AM    < end of copied text >  < from: US Abdomen Upper Quadrant Right (01.31.22 @ 08:21) >    ACC: [id number] EXAM:  US ABDOMEN RT UPR QUADRANT                          PROCEDURE DATE:  01/31/2022      INTERPRETATION:  CLINICAL INFORMATION: Epigastric abdominal tenderness    COMPARISON: Abdominal ultrasound dated 8/5/2019.    TECHNIQUE: Sonography of the right upper quadrant.    FINDINGS:    Liver: New mild hepatomegaly measuring 17.1 cm, increased from prior   measurement of 15.4 cm.  Bile ducts: Common bile duct measures 9 mm which is dilated..  Gallbladder: Distended gallbladder measuring 14.9 cm without evidence for   gallstones, thickened gallbladder wall, pericholecystic fluid or   ultrasonic Proctor's sign.  Pancreas: Visualized portions are within normal limits.  Right kidney: 12.0 cm. No hydronephrosis. Within normal limits.  Ascites: None.  IVC: Visualized portions are within normal limits.    IMPRESSION:    Dilated common bile duct. MRCP may be pursued for further evaluation.    Distended gallbladder without evidence for gallstones, thickened   gallbladder wall, pericholecystic fluid or ultrasonic Proctor's sign.    --- End of Report ---    JAIMIE DENNIS MD; Attending Radiologist  This document has been electronically signed. Jan 31 2022  8:43AM    < end of copied text >    52 YO Male transferred from [facility] to the ED because of abdominal pain and fever found to have Pancolitis likely due to Rotavirus, clinically with improvement, decrease in frequency of BM's, mild RUQ abdominal pain, underwent RUQ US which reveals dilated CBD, and distended GB without evidence of GS, GB wall thickening or winston-cholecystic fluid and likely due to NPO status, Labs stable, LFT's normal    -Discussed CBD dilation 9mm w/GI- Dr. Connell, no plan for further imaging as patient primarily asymptomatic with normal LFT's  -Continue DVT Prophylaxis with SCD's, Eliquis & ASA81 for hx of PE/CAD  -Off antibiotics per ID  -Continue Incentive Spirometry  -Continue analgesia  -Encourage OOB/ambulation with assistance  -Monitor bowel function  -Diet advanced to regular per primary team  -No surgical intervention indicated at this time.  -Above discussed with Dr. Wilson  
SURGERY PA NOTE ON BEHALF OF DR. WILSON / GENERAL SURGERY:    S: Patient seen and examined at bedside.  Reports feeling improvement with regard to abdominal pain/vomiting.  Has some mild, intermittent nausea, but otherwise tolerating CLD.  Admits to flatus and loose/watery BM's.  Urinating without issue.  Has been OOB.    MEDICATIONS:  acetaminophen     Tablet .. 650 milliGRAM(s) Oral every 6 hours PRN  apixaban 5 milliGRAM(s) Oral two times a day  aspirin  chewable 81 milliGRAM(s) Oral daily  atorvastatin 40 milliGRAM(s) Oral at bedtime  bethanechol 25 milliGRAM(s) Oral three times a day  cholestyramine Powder (Sugar-Free) 4 Gram(s) Oral daily  dextrose 40% Gel 15 Gram(s) Oral once  dextrose 50% Injectable 25 Gram(s) IV Push once  dextrose 50% Injectable 12.5 Gram(s) IV Push once  dextrose 50% Injectable 25 Gram(s) IV Push once  famotidine    Tablet 20 milliGRAM(s) Oral daily  gabapentin 100 milliGRAM(s) Oral daily  glucagon  Injectable 1 milliGRAM(s) IntraMuscular once  insulin glargine Injectable (LANTUS) 10 Unit(s) SubCutaneous at bedtime  insulin lispro (ADMELOG) corrective regimen sliding scale   SubCutaneous three times a day before meals  insulin lispro (ADMELOG) corrective regimen sliding scale   SubCutaneous at bedtime  isosorbide   mononitrate ER Tablet (IMDUR) 30 milliGRAM(s) Oral daily  lactobacillus acidophilus 1 Tablet(s) Oral three times a day  loperamide 2 milliGRAM(s) Oral four times a day PRN  losartan 25 milliGRAM(s) Oral daily  methimazole 10 milliGRAM(s) Oral daily  metoprolol succinate ER 50 milliGRAM(s) Oral daily  multivitamin 1 Tablet(s) Oral daily  ondansetron Injectable 4 milliGRAM(s) IV Push four times a day PRN  saccharomyces boulardii 250 milliGRAM(s) Oral two times a day  tamsulosin 0.4 milliGRAM(s) Oral at bedtime      O:  Vital Signs Last 24 Hrs  T(C): 36.6 (01 Feb 2022 05:20), Max: 36.8 (31 Jan 2022 20:04)  T(F): 97.9 (01 Feb 2022 05:20), Max: 98.3 (31 Jan 2022 20:04)  HR: 62 (01 Feb 2022 05:20) (61 - 68)  BP: 126/70 (01 Feb 2022 05:20) (126/70 - 153/96)  RR: 17 (01 Feb 2022 05:20) (17 - 17)  SpO2: 95% (01 Feb 2022 05:20) (93% - 95%)    I&O SUMMARY:    01-31-22 @ 07:01  -  02-01-22 @ 07:00  --------------------------------------------------------  IN: 0 mL / OUT: 1000 mL / NET: -1000 mL        PHYSICAL EXAM:  Gen: WDWN, in NAD, resting comfortably   Abd: Soft, ND.  Mild tenderness in the epigastric region.  Non-tender over RUQ, negative Proctor's sign.  No rebound/guarding.    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 Wilson, will follow       
[facility], Division of Infectious Diseases  DELGADO Rodriguez Y. Patel, S. Shah, G. Casimir  [phone]  ([phone] - weekdays after 5pm and weekends)    Name: SARAH MORRISON  Age/Gender: 51y Male  MRN: [id number]    Interval History:  Patient seen this morning.   Had 5-6 BMs yesterday, last was around 10pm.  No BMs overnight, mild abd pain unchanged.  Feels some nausea still, trying have breakfast.  Notes reviewed. Afebrile     Allergies: fish (Hives)  No Known Drug Allergies    Objective:  Vitals:   T(F): 97.9 (02-01-22 @ 05:20), Max: 98.3 (01-31-22 @ 20:04)  HR: 62 (02-01-22 @ 05:20) (61 - 68)  BP: 126/70 (02-01-22 @ 05:20) (126/70 - 153/96)  RR: 17 (02-01-22 @ 05:20) (17 - 17)  SpO2: 95% (02-01-22 @ 05:20) (93% - 95%)  Physical Examination:  General: no acute distress  HEENT: NC/AT, anicteric, neck supple  Respiratory: no acc muscle use, breathing comfortably  Cardiovascular: S1 and S2 present  Gastrointestinal: normal appearing, nondistended  Extremities: no edema, no cyanosis  Skin: no visible rash    Laboratory Studies:  CBC:                       12.2   3.04  )-----------( 161      ( 31 Jan 2022 09:40 )             36.7     WBC Trend:  3.04 01-31-22 @ 09:40  9.53 01-28-22 @ 16:44    CMP: 01-31    143  |  115<H>  |  8   ----------------------------<  115<H>  3.5   |  24  |  1.00    Ca    8.2<L>      31 Jan 2022 09:40    TPro  6.4  /  Alb  2.8<L>  /  TBili  0.2  /  DBili  <0.1  /  AST  78<H>  /  ALT  45  /  AlkPhos  81  01-31    LIVER FUNCTIONS - ( 31 Jan 2022 09:40 )  Alb: 2.8 g/dL / Pro: 6.4 g/dL / ALK PHOS: 81 U/L / ALT: 45 U/L / AST: 78 U/L / GGT: x           Microbiology: reviewed   GI PCR Panel, Stool (collected 01-30-22 @ 00:40)  Source: .Stool Feces  Final Report (01-30-22 @ 13:13):    Rotavirus A    DETECTED by PCR    *******Please Note:*******    GI panel PCR evaluates for:    Campylobacter, Plesiomonas shigelloides, Salmonella,    Vibrio, Yersinia enterocolitica, Enteroaggregative    Escherichia coli (EAEC), Enteropathogenic E.coli (EPEC),    Enterotoxigenic E. coli (ETEC) lt/st, Shiga-like    toxin-producing E. coli (STEC) stx1/stx2,    Shigella/ Enteroinvasive E. coli (EIEC), Cryptosporidium,    Cyclospora cayetanensis, Entamoeba histolytica,    Giardia lamblia, Adenovirus F 40/41, Astrovirus,    Norovirus GI/GII, Rotavirus A, Sapovirus    Culture - Stool (collected 01-30-22 @ 00:40)  Source: .Stool Feces  Preliminary Report (01-31-22 @ 13:31):    No enteric pathogens to date: Final culture pending    Culture - Blood (collected 01-28-22 @ 20:59)  Source: .Blood Blood-Peripheral  Preliminary Report (01-29-22 @ 21:01):    No growth to date.    Culture - Blood (collected 01-28-22 @ 20:59)  Source: .Blood Blood-Peripheral  Preliminary Report (01-29-22 @ 21:01):    No growth to date.    Radiology: reviewed     Medications:  acetaminophen     Tablet .. 650 milliGRAM(s) Oral every 6 hours PRN  apixaban 5 milliGRAM(s) Oral two times a day  aspirin  chewable 81 milliGRAM(s) Oral daily  atorvastatin 40 milliGRAM(s) Oral at bedtime  bethanechol 25 milliGRAM(s) Oral three times a day  cholestyramine Powder (Sugar-Free) 4 Gram(s) Oral daily  dextrose 40% Gel 15 Gram(s) Oral once  dextrose 50% Injectable 25 Gram(s) IV Push once  dextrose 50% Injectable 12.5 Gram(s) IV Push once  dextrose 50% Injectable 25 Gram(s) IV Push once  famotidine    Tablet 20 milliGRAM(s) Oral daily  gabapentin 100 milliGRAM(s) Oral daily  glucagon  Injectable 1 milliGRAM(s) IntraMuscular once  insulin glargine Injectable (LANTUS) 10 Unit(s) SubCutaneous at bedtime  insulin lispro (ADMELOG) corrective regimen sliding scale   SubCutaneous three times a day before meals  insulin lispro (ADMELOG) corrective regimen sliding scale   SubCutaneous at bedtime  isosorbide   mononitrate ER Tablet (IMDUR) 30 milliGRAM(s) Oral daily  lactobacillus acidophilus 1 Tablet(s) Oral three times a day  loperamide 2 milliGRAM(s) Oral four times a day PRN  losartan 25 milliGRAM(s) Oral daily  methimazole 10 milliGRAM(s) Oral daily  metoprolol succinate ER 50 milliGRAM(s) Oral daily  multivitamin 1 Tablet(s) Oral daily  ondansetron Injectable 4 milliGRAM(s) IV Push four times a day PRN  saccharomyces boulardii 250 milliGRAM(s) Oral two times a day  tamsulosin 0.4 milliGRAM(s) Oral at bedtime
pt seen  doing well  ICU Vital Signs Last 24 Hrs  T(C): 36.6 (02 Feb 2022 05:00), Max: 37.1 (01 Feb 2022 20:29)  T(F): 97.8 (02 Feb 2022 05:00), Max: 98.7 (01 Feb 2022 20:29)  HR: 70 (02 Feb 2022 08:47) (50 - 70)  BP: 134/77 (02 Feb 2022 08:47) (132/76 - 137/67)  BP(mean): --  ABP: --  ABP(mean): --  RR: 18 (02 Feb 2022 05:00) (18 - 19)  SpO2: 94% (02 Feb 2022 05:00) (93% - 94%)  gen-NAD  resp-clear  abd-soft NT/ND                          12.2   3.78  )-----------( 169      ( 01 Feb 2022 09:39 )             36.7   
CAPILLARY BLOOD GLUCOSE      POCT Blood Glucose.: 93 mg/dL (31 Jan 2022 07:47)  POCT Blood Glucose.: 163 mg/dL (30 Jan 2022 22:19)  POCT Blood Glucose.: 169 mg/dL (30 Jan 2022 16:54)  POCT Blood Glucose.: 126 mg/dL (30 Jan 2022 11:59)      Vital Signs Last 24 Hrs  T(C): 36.7 (31 Jan 2022 05:24), Max: 37 (30 Jan 2022 21:52)  T(F): 98.1 (31 Jan 2022 05:24), Max: 98.6 (30 Jan 2022 21:52)  HR: 68 (31 Jan 2022 05:24) (62 - 75)  BP: 103/60 (31 Jan 2022 05:24) (103/60 - 158/79)  BP(mean): --  RR: 17 (31 Jan 2022 05:24) (17 - 17)  SpO2: 95% (31 Jan 2022 05:24) (94% - 95%)    Respiratory: CTA B/L  CV: RRR, S1S2, no murmurs, rubs or gallops  Abdominal: Soft, NT, ND +BS, Last BM  Extremities: No edema, + peripheral pulses             atorvastatin 40 milliGRAM(s) Oral at bedtime  cholestyramine Powder (Sugar-Free) 4 Gram(s) Oral daily  dextrose 40% Gel 15 Gram(s) Oral once  dextrose 50% Injectable 25 Gram(s) IV Push once  dextrose 50% Injectable 12.5 Gram(s) IV Push once  dextrose 50% Injectable 25 Gram(s) IV Push once  glucagon  Injectable 1 milliGRAM(s) IntraMuscular once  insulin glargine Injectable (LANTUS) 10 Unit(s) SubCutaneous two times a day  insulin lispro (ADMELOG) corrective regimen sliding scale   SubCutaneous three times a day before meals  insulin lispro (ADMELOG) corrective regimen sliding scale   SubCutaneous at bedtime  methimazole 10 milliGRAM(s) Oral daily  
[facility], Division of Infectious Diseases  DELGADO Rodriguez Y. Patel, S. Shah, G. Casimir  [phone]  ([phone] - weekdays after 5pm and weekends)    Name: SARAH MORRISON  Age/Gender: 51y Male  MRN: [id number]    Interval History:  Patient seen this morning, feels about the same.  Reports 8-9 loose BMs since yesterday.   No new complaints. Notes reviewed  No concerning overnight events  Afebrile     Allergies: fish (Hives)  No Known Drug Allergies    Objective:  Vitals:   T(F): 98.1 (01-31-22 @ 05:24), Max: 98.6 (01-30-22 @ 21:52)  HR: 68 (01-31-22 @ 05:24) (62 - 75)  BP: 103/60 (01-31-22 @ 05:24) (103/60 - 158/79)  RR: 17 (01-31-22 @ 05:24) (17 - 17)  SpO2: 95% (01-31-22 @ 05:24) (94% - 95%)  Physical Examination:  General: no acute distress  HEENT: NC/AT, anicteric, neck supple  Respiratory: no acc muscle use, breathing comfortably  Cardiovascular: S1 and S2 present  Gastrointestinal: normal appearing, nondistended  Extremities: no edema, no cyanosis  Skin: no visible rash    Laboratory Studies:  CBC:   WBC Trend:  9.53 01-28-22 @ 16:44    CMP: 01-31    143  |  115<H>  |  8   ----------------------------<  115<H>  3.5   |  24  |  1.00    Ca    8.2<L>      31 Jan 2022 09:40    TPro  6.4  /  Alb  2.8<L>  /  TBili  0.2  /  DBili  <0.1  /  AST  78<H>  /  ALT  45  /  AlkPhos  81  01-31    LIVER FUNCTIONS - ( 31 Jan 2022 09:40 )  Alb: 2.8 g/dL / Pro: 6.4 g/dL / ALK PHOS: 81 U/L / ALT: 45 U/L / AST: 78 U/L / GGT: x           Microbiology: reviewed   GI PCR Panel, Stool (collected 01-30-22 @ 00:40)  Source: .Stool Feces  Final Report (01-30-22 @ 13:13):    Rotavirus A    DETECTED by PCR    *******Please Note:*******    GI panel PCR evaluates for:    Campylobacter, Plesiomonas shigelloides, Salmonella,    Vibrio, Yersinia enterocolitica, Enteroaggregative    Escherichia coli (EAEC), Enteropathogenic E.coli (EPEC),    Enterotoxigenic E. coli (ETEC) lt/st, Shiga-like    toxin-producing E. coli (STEC) stx1/stx2,    Shigella/ Enteroinvasive E. coli (EIEC), Cryptosporidium,    Cyclospora cayetanensis, Entamoeba histolytica,    Giardia lamblia, Adenovirus F 40/41, Astrovirus,    Norovirus GI/GII, Rotavirus A, Sapovirus    Culture - Blood (collected 01-28-22 @ 20:59)  Source: .Blood Blood-Peripheral  Preliminary Report (01-29-22 @ 21:01):    No growth to date.    Culture - Blood (collected 01-28-22 @ 20:59)  Source: .Blood Blood-Peripheral  Preliminary Report (01-29-22 @ 21:01):    No growth to date.    Radiology: reviewed     Medications:  acetaminophen     Tablet .. 650 milliGRAM(s) Oral every 6 hours PRN  apixaban 5 milliGRAM(s) Oral two times a day  aspirin  chewable 81 milliGRAM(s) Oral daily  atorvastatin 40 milliGRAM(s) Oral at bedtime  bethanechol 25 milliGRAM(s) Oral three times a day  cholestyramine Powder (Sugar-Free) 4 Gram(s) Oral daily  dextrose 40% Gel 15 Gram(s) Oral once  dextrose 50% Injectable 25 Gram(s) IV Push once  dextrose 50% Injectable 12.5 Gram(s) IV Push once  dextrose 50% Injectable 25 Gram(s) IV Push once  famotidine    Tablet 20 milliGRAM(s) Oral daily  gabapentin 100 milliGRAM(s) Oral daily  glucagon  Injectable 1 milliGRAM(s) IntraMuscular once  insulin glargine Injectable (LANTUS) 10 Unit(s) SubCutaneous at bedtime  insulin lispro (ADMELOG) corrective regimen sliding scale   SubCutaneous three times a day before meals  insulin lispro (ADMELOG) corrective regimen sliding scale   SubCutaneous at bedtime  isosorbide   mononitrate ER Tablet (IMDUR) 30 milliGRAM(s) Oral daily  lactobacillus acidophilus 1 Tablet(s) Oral three times a day  loperamide 2 milliGRAM(s) Oral four times a day PRN  losartan 25 milliGRAM(s) Oral daily  methimazole 10 milliGRAM(s) Oral daily  metoprolol succinate ER 50 milliGRAM(s) Oral daily  morphine  - Injectable 2 milliGRAM(s) IV Push every 6 hours PRN  multivitamin 1 Tablet(s) Oral daily  ondansetron Injectable 4 milliGRAM(s) IV Push four times a day PRN  saccharomyces boulardii 250 milliGRAM(s) Oral two times a day  tamsulosin 0.4 milliGRAM(s) Oral at bedtime  
Patient is a 51y old  Male who presents with a chief complaint of Abdominal pain, nausea, vomiting, diarrhea and fever (03 Feb 2022 11:29)      INTERVAL /OVERNIGHT EVENTS: diarhea improving    MEDICATIONS  (STANDING):  apixaban 5 milliGRAM(s) Oral two times a day  aspirin  chewable 81 milliGRAM(s) Oral daily  atorvastatin 40 milliGRAM(s) Oral at bedtime  bethanechol 25 milliGRAM(s) Oral three times a day  dextrose 40% Gel 15 Gram(s) Oral once  dextrose 50% Injectable 25 Gram(s) IV Push once  dextrose 50% Injectable 12.5 Gram(s) IV Push once  dextrose 50% Injectable 25 Gram(s) IV Push once  famotidine    Tablet 20 milliGRAM(s) Oral daily  gabapentin 100 milliGRAM(s) Oral daily  glucagon  Injectable 1 milliGRAM(s) IntraMuscular once  insulin glargine Injectable (LANTUS) 10 Unit(s) SubCutaneous at bedtime  insulin lispro (ADMELOG) corrective regimen sliding scale   SubCutaneous three times a day before meals  insulin lispro (ADMELOG) corrective regimen sliding scale   SubCutaneous at bedtime  isosorbide   mononitrate ER Tablet (IMDUR) 30 milliGRAM(s) Oral daily  lactobacillus acidophilus 1 Tablet(s) Oral three times a day  losartan 25 milliGRAM(s) Oral daily  methimazole 10 milliGRAM(s) Oral daily  metoprolol succinate ER 50 milliGRAM(s) Oral daily  multivitamin 1 Tablet(s) Oral daily  saccharomyces boulardii 250 milliGRAM(s) Oral two times a day  tamsulosin 0.4 milliGRAM(s) Oral at bedtime    MEDICATIONS  (PRN):  acetaminophen     Tablet .. 650 milliGRAM(s) Oral every 6 hours PRN Temp greater or equal to 38C (100.4F)  loperamide 2 milliGRAM(s) Oral four times a day PRN Diarrhea  ondansetron Injectable 4 milliGRAM(s) IV Push four times a day PRN Nausea and/or Vomiting      Allergies    fish (Hives)  No Known Drug Allergies    Intolerances        REVIEW OF SYSTEMS:  CONSTITUTIONAL: No fever, weight loss, or fatigue  EYES: No eye pain, visual disturbances, or discharge  ENMT:  No difficulty hearing, tinnitus, vertigo; No sinus or throat pain  NECK: No pain or stiffness  RESPIRATORY: No cough, wheezing, chills or hemoptysis; No shortness of breath  CARDIOVASCULAR: No chest pain, palpitations, dizziness, or leg swelling  GASTROINTESTINAL: No abdominal or epigastric pain. No nausea, vomiting, or hematemesis; No diarrhea or constipation. No melena or hematochezia.  GENITOURINARY: No dysuria, frequency, hematuria, or incontinence  NEUROLOGICAL: No headaches, memory loss, loss of strength, numbness, or tremors  SKIN: No itching, burning, rashes, or lesions   LYMPH NODES: No enlarged glands  ENDOCRINE: No heat or cold intolerance; No hair loss; No polydipsia or polyuria  MUSCULOSKELETAL: No joint pain or swelling; No muscle, back, or extremity pain  PSYCHIATRIC: No depression, anxiety, mood swings, or difficulty sleeping  HEME/LYMPH: No easy bruising, or bleeding gums  ALLERGY AND IMMUNOLOGIC: No hives or eczema    Vital Signs Last 24 Hrs  T(C): 36.6 (03 Feb 2022 13:34), Max: 36.6 (02 Feb 2022 20:06)  T(F): 97.9 (03 Feb 2022 13:34), Max: 97.9 (03 Feb 2022 13:34)  HR: 63 (03 Feb 2022 13:34) (54 - 65)  BP: 144/68 (03 Feb 2022 13:34) (121/64 - 144/68)  BP(mean): --  RR: 16 (03 Feb 2022 13:34) (16 - 18)  SpO2: 94% (03 Feb 2022 13:34) (93% - 95%)    PHYSICAL EXAM:  GENERAL: NAD, well-groomed, well-developed  HEAD:  Atraumatic, Normocephalic  EYES: EOMI, PERRLA, conjunctiva and sclera clear  ENMT: No tonsillar erythema, exudates, or enlargement; Moist mucous membranes, Good dentition, No lesions  NECK: Supple, No JVD, Normal thyroid  NERVOUS SYSTEM:  Alert & Oriented X3, Good concentration; Motor Strength 5/5 B/L upper and lower extremities; DTRs 2+ intact and symmetric  CHEST/LUNG: Clear to auscultation bilaterally; No rales, rhonchi, wheezing, or rubs  HEART: Regular rate and rhythm; No murmurs, rubs, or gallops  ABDOMEN: Soft, Nontender, Nondistended; Bowel sounds present  EXTREMITIES:  2+ Peripheral Pulses, No clubbing, cyanosis, or edema  LYMPH: No lymphadenopathy noted  SKIN: No rashes or lesions    LABS:                        13.7   7.97  )-----------( 219      ( 03 Feb 2022 11:05 )             39.4     03 Feb 2022 11:05    140    |  108    |  12     ----------------------------<  176    4.1     |  26     |  0.96     Ca    9.0        03 Feb 2022 11:05          CAPILLARY BLOOD GLUCOSE      POCT Blood Glucose.: 140 mg/dL (03 Feb 2022 12:24)  POCT Blood Glucose.: 159 mg/dL (03 Feb 2022 07:51)  POCT Blood Glucose.: 193 mg/dL (02 Feb 2022 21:26)  POCT Blood Glucose.: 143 mg/dL (02 Feb 2022 16:47)      RADIOLOGY & ADDITIONAL TESTS:    Notes Reviewed:  [x ] YES  [ ] NO    Care Discussed with Consultants/Other Providers [x ] YES  [ ] NO
pt seen  doing well  no complaints  ICU Vital Signs Last 24 Hrs  T(C): 36.7 (04 Feb 2022 04:50), Max: 36.7 (04 Feb 2022 04:50)  T(F): 98.1 (04 Feb 2022 04:50), Max: 98.1 (04 Feb 2022 04:50)  HR: 60 (04 Feb 2022 04:50) (60 - 73)  BP: 120/69 (04 Feb 2022 04:50) (120/69 - 144/68)  BP(mean): --  ABP: --  ABP(mean): --  RR: 18 (04 Feb 2022 04:50) (16 - 18)  SpO2: 93% (04 Feb 2022 04:50) (93% - 97%)  gen-NAD  resp-clear  abd-soft NT/ND                        13.7   7.97  )-----------( 219      ( 03 Feb 2022 11:05 )             39.4     02-03    140  |  108  |  12  ----------------------------<  176<H>  4.1   |  26  |  0.96    Ca    9.0      03 Feb 2022 11:05    
[facility]  Tor Car PA-C  Formerly [facility] [place] [address]  [phone]      INTERVAL HPI/OVERNIGHT EVENTS:  Seen and examined  CDiff neg, GI pcr pending   still w/ complaints of diarrhea       MEDICATIONS  (STANDING):  apixaban 5 milliGRAM(s) Oral two times a day  aspirin  chewable 81 milliGRAM(s) Oral daily  atorvastatin 40 milliGRAM(s) Oral at bedtime  bethanechol 25 milliGRAM(s) Oral three times a day  cholestyramine Powder (Sugar-Free) 4 Gram(s) Oral daily  dextrose 40% Gel 15 Gram(s) Oral once  dextrose 50% Injectable 25 Gram(s) IV Push once  dextrose 50% Injectable 12.5 Gram(s) IV Push once  dextrose 50% Injectable 25 Gram(s) IV Push once  famotidine    Tablet 20 milliGRAM(s) Oral daily  gabapentin 100 milliGRAM(s) Oral daily  glucagon  Injectable 1 milliGRAM(s) IntraMuscular once  insulin glargine Injectable (LANTUS) 10 Unit(s) SubCutaneous two times a day  insulin lispro (ADMELOG) corrective regimen sliding scale   SubCutaneous three times a day before meals  insulin lispro (ADMELOG) corrective regimen sliding scale   SubCutaneous at bedtime  isosorbide   mononitrate ER Tablet (IMDUR) 30 milliGRAM(s) Oral daily  lactobacillus acidophilus 1 Tablet(s) Oral three times a day  losartan 25 milliGRAM(s) Oral daily  methimazole 10 milliGRAM(s) Oral daily  metoprolol succinate ER 50 milliGRAM(s) Oral daily  multivitamin 1 Tablet(s) Oral daily  saccharomyces boulardii 250 milliGRAM(s) Oral two times a day  tamsulosin 0.4 milliGRAM(s) Oral at bedtime    MEDICATIONS  (PRN):  acetaminophen     Tablet .. 650 milliGRAM(s) Oral every 6 hours PRN Temp greater or equal to 38C (100.4F)  morphine  - Injectable 2 milliGRAM(s) IV Push every 6 hours PRN Severe Pain (7 - 10)  ondansetron Injectable 4 milliGRAM(s) IV Push four times a day PRN Nausea and/or Vomiting      Allergies    fish (Hives)  No Known Drug Allergies    Intolerances          PHYSICAL EXAM:   Vital Signs:  Vital Signs Last 24 Hrs  T(C): 36.8 (2022 05:06), Max: 37.9 (2022 17:32)  T(F): 98.3 (2022 05:06), Max: 100.2 (2022 17:32)  HR: 91 (2022 05:06) (81 - 91)  BP: 142/92 (2022 05:06) (127/76 - 171/82)  BP(mean): --  RR: 16 (2022 05:06) (15 - 16)  SpO2: 94% (2022 05:06) (93% - 98%)  Daily     Daily     GENERAL:  Appears stated age,   HEENT:  NC/AT,    CHEST:  Full & symmetric excursion,   HEART:  Regular rhythm,  ABDOMEN:  Soft, non-tender, non-distended,  EXTEREMITIES:  no cyanosis  SKIN:  No rash  NEURO:  Alert,       LABS:                        16.4   9.53  )-----------( 214      ( 2022 16:44 )             48.7         137  |  105  |  22  ----------------------------<  186<H>  4.2   |  25  |  1.20    Ca    9.2      2022 16:44    TPro  8.4<H>  /  Alb  3.8  /  TBili  0.5  /  DBili  x   /  AST  18  /  ALT  26  /  AlkPhos  134<H>        Urinalysis Basic - ( 2022 16:44 )    Color: Yellow / Appearance: Slightly Turbid / S.015 / pH: x  Gluc: x / Ketone: Trace  / Bili: Small / Urobili: Negative   Blood: x / Protein: 30 mg/dL / Nitrite: Negative   Leuk Esterase: Moderate / RBC: 11-25 /HPF / WBC 11-25   Sq Epi: x / Non Sq Epi: Occasional / Bacteria: Moderate        RADIOLOGY & ADDITIONAL TESTS:  
[facility], Division of Infectious Diseases  DELGADO Rodriguez Y. Patel, S. Shah, G. Casimir  [phone]  ([phone] - weekdays after 5pm and weekends)    Name: SARAH MORRISON  Age/Gender: 51y Male  MRN: [id number]    Interval History:  Patient seen this morning, feeling better.   No new complaints, had 1 BM over last 24h.   Abdominal pain better, denies n/v, or fevers.   Notes reviewed  No concerning overnight events  Afebrile     Allergies: fish (Hives)  No Known Drug Allergies    Objective:  Vitals:   T(F): 97.6 (02-03-22 @ 05:33), Max: 98.5 (02-02-22 @ 11:46)  HR: 54 (02-03-22 @ 05:33) (54 - 70)  BP: 121/64 (02-03-22 @ 05:33) (121/64 - 153/83)  RR: 18 (02-03-22 @ 05:33) (18 - 18)  SpO2: 93% (02-03-22 @ 05:33) (93% - 95%)  Physical Examination:  General: no acute distress  HEENT: NC/AT, anicteric, neck supple  Respiratory: no acc muscle use, breathing comfortably  Cardiovascular: S1 and S2 present  Gastrointestinal: normal appearing, nondistended  Extremities: no edema, no cyanosis  Skin: no visible rash    Laboratory Studies:  CBC:                       12.2   3.78  )-----------( 169      ( 01 Feb 2022 09:39 )             36.7     WBC Trend:  3.78 02-01-22 @ 09:39  3.04 01-31-22 @ 09:40  9.53 01-28-22 @ 16:44    CMP: 02-01    144  |  115<H>  |  8   ----------------------------<  114<H>  3.3<L>   |  25  |  0.96    Ca    8.4<L>      01 Feb 2022 09:39    Microbiology: reviewed   GI PCR Panel, Stool (collected 01-30-22 @ 00:40)  Source: .Stool Feces  Final Report (01-30-22 @ 13:13):    Rotavirus A    DETECTED by PCR    *******Please Note:*******    GI panel PCR evaluates for:    Campylobacter, Plesiomonas shigelloides, Salmonella,    Vibrio, Yersinia enterocolitica, Enteroaggregative    Escherichia coli (EAEC), Enteropathogenic E.coli (EPEC),    Enterotoxigenic E. coli (ETEC) lt/st, Shiga-like    toxin-producing E. coli (STEC) stx1/stx2,    Shigella/ Enteroinvasive E. coli (EIEC), Cryptosporidium,    Cyclospora cayetanensis, Entamoeba histolytica,    Giardia lamblia, Adenovirus F 40/41, Astrovirus,    Norovirus GI/GII, Rotavirus A, Sapovirus    Culture - Stool (collected 01-30-22 @ 00:40)  Source: .Stool Feces  Final Report (02-01-22 @ 10:09):    No enteric pathogens isolated.    (Stool culture examined for Salmonella,    Shigella, Campylobacter, Aeromonas, Plesiomonas,    Vibrio, E.coli O157 and Yersinia)    Culture - Blood (collected 01-28-22 @ 20:59)  Source: .Blood Blood-Peripheral  Final Report (02-02-22 @ 21:00):    No Growth Final    Culture - Blood (collected 01-28-22 @ 20:59)  Source: .Blood Blood-Peripheral  Final Report (02-02-22 @ 21:00):    No Growth Final      Radiology: reviewed     Medications:  acetaminophen     Tablet .. 650 milliGRAM(s) Oral every 6 hours PRN  apixaban 5 milliGRAM(s) Oral two times a day  aspirin  chewable 81 milliGRAM(s) Oral daily  atorvastatin 40 milliGRAM(s) Oral at bedtime  bethanechol 25 milliGRAM(s) Oral three times a day  cholestyramine Powder (Sugar-Free) 4 Gram(s) Oral daily  dextrose 40% Gel 15 Gram(s) Oral once  dextrose 50% Injectable 25 Gram(s) IV Push once  dextrose 50% Injectable 12.5 Gram(s) IV Push once  dextrose 50% Injectable 25 Gram(s) IV Push once  famotidine    Tablet 20 milliGRAM(s) Oral daily  gabapentin 100 milliGRAM(s) Oral daily  glucagon  Injectable 1 milliGRAM(s) IntraMuscular once  insulin glargine Injectable (LANTUS) 10 Unit(s) SubCutaneous at bedtime  insulin lispro (ADMELOG) corrective regimen sliding scale   SubCutaneous three times a day before meals  insulin lispro (ADMELOG) corrective regimen sliding scale   SubCutaneous at bedtime  isosorbide   mononitrate ER Tablet (IMDUR) 30 milliGRAM(s) Oral daily  lactobacillus acidophilus 1 Tablet(s) Oral three times a day  loperamide 2 milliGRAM(s) Oral four times a day PRN  losartan 25 milliGRAM(s) Oral daily  methimazole 10 milliGRAM(s) Oral daily  metoprolol succinate ER 50 milliGRAM(s) Oral daily  multivitamin 1 Tablet(s) Oral daily  ondansetron Injectable 4 milliGRAM(s) IV Push four times a day PRN  saccharomyces boulardii 250 milliGRAM(s) Oral two times a day  tamsulosin 0.4 milliGRAM(s) Oral at bedtime    Antimicrobials:  
[facility]  Tor Car PA-C  [facility] [place] [address]  [phone]      INTERVAL HPI/OVERNIGHT EVENTS:  Pt s/e  Pt reports he feels well, diarrhea and abdominal pain improved  Denies further GI complaints    MEDICATIONS  (STANDING):  apixaban 5 milliGRAM(s) Oral two times a day  aspirin  chewable 81 milliGRAM(s) Oral daily  atorvastatin 40 milliGRAM(s) Oral at bedtime  bethanechol 25 milliGRAM(s) Oral three times a day  cholestyramine Powder (Sugar-Free) 4 Gram(s) Oral daily  dextrose 40% Gel 15 Gram(s) Oral once  dextrose 50% Injectable 25 Gram(s) IV Push once  dextrose 50% Injectable 12.5 Gram(s) IV Push once  dextrose 50% Injectable 25 Gram(s) IV Push once  famotidine    Tablet 20 milliGRAM(s) Oral daily  gabapentin 100 milliGRAM(s) Oral daily  glucagon  Injectable 1 milliGRAM(s) IntraMuscular once  insulin glargine Injectable (LANTUS) 10 Unit(s) SubCutaneous at bedtime  insulin lispro (ADMELOG) corrective regimen sliding scale   SubCutaneous three times a day before meals  insulin lispro (ADMELOG) corrective regimen sliding scale   SubCutaneous at bedtime  isosorbide   mononitrate ER Tablet (IMDUR) 30 milliGRAM(s) Oral daily  lactobacillus acidophilus 1 Tablet(s) Oral three times a day  losartan 25 milliGRAM(s) Oral daily  methimazole 10 milliGRAM(s) Oral daily  metoprolol succinate ER 50 milliGRAM(s) Oral daily  multivitamin 1 Tablet(s) Oral daily  saccharomyces boulardii 250 milliGRAM(s) Oral two times a day  tamsulosin 0.4 milliGRAM(s) Oral at bedtime    MEDICATIONS  (PRN):  acetaminophen     Tablet .. 650 milliGRAM(s) Oral every 6 hours PRN Temp greater or equal to 38C (100.4F)  loperamide 2 milliGRAM(s) Oral four times a day PRN Diarrhea  ondansetron Injectable 4 milliGRAM(s) IV Push four times a day PRN Nausea and/or Vomiting      Allergies    fish (Hives)  No Known Drug Allergies    PHYSICAL EXAM:   Vital Signs:  Vital Signs Last 24 Hrs  T(C): 36.4 (03 Feb 2022 05:33), Max: 36.9 (02 Feb 2022 11:46)  T(F): 97.6 (03 Feb 2022 05:33), Max: 98.5 (02 Feb 2022 11:46)  HR: 54 (03 Feb 2022 05:33) (54 - 65)  BP: 121/64 (03 Feb 2022 05:33) (121/64 - 153/83)  BP(mean): --  RR: 18 (03 Feb 2022 05:33) (18 - 18)  SpO2: 93% (03 Feb 2022 05:33) (93% - 95%)  Daily     Daily     GENERAL:  Appears stated age  ABDOMEN:  Soft, non-tender, non-distended  NEURO:  Alert      LABS:                        13.7   7.97  )-----------( 219      ( 03 Feb 2022 11:05 )             39.4     
[facility]  Tor Car PA-C  [facility] [place] [address]  [phone]      INTERVAL HPI/OVERNIGHT EVENTS:  Pt s/e  Pt reports diarrhea, nausea, and abdominal discomfort are all improving  Denies further GI complaints    MEDICATIONS  (STANDING):  apixaban 5 milliGRAM(s) Oral two times a day  aspirin  chewable 81 milliGRAM(s) Oral daily  atorvastatin 40 milliGRAM(s) Oral at bedtime  bethanechol 25 milliGRAM(s) Oral three times a day  cholestyramine Powder (Sugar-Free) 4 Gram(s) Oral daily  dextrose 40% Gel 15 Gram(s) Oral once  dextrose 50% Injectable 25 Gram(s) IV Push once  dextrose 50% Injectable 12.5 Gram(s) IV Push once  dextrose 50% Injectable 25 Gram(s) IV Push once  famotidine    Tablet 20 milliGRAM(s) Oral daily  gabapentin 100 milliGRAM(s) Oral daily  glucagon  Injectable 1 milliGRAM(s) IntraMuscular once  insulin glargine Injectable (LANTUS) 10 Unit(s) SubCutaneous at bedtime  insulin lispro (ADMELOG) corrective regimen sliding scale   SubCutaneous three times a day before meals  insulin lispro (ADMELOG) corrective regimen sliding scale   SubCutaneous at bedtime  isosorbide   mononitrate ER Tablet (IMDUR) 30 milliGRAM(s) Oral daily  lactobacillus acidophilus 1 Tablet(s) Oral three times a day  losartan 25 milliGRAM(s) Oral daily  methimazole 10 milliGRAM(s) Oral daily  metoprolol succinate ER 50 milliGRAM(s) Oral daily  multivitamin 1 Tablet(s) Oral daily  saccharomyces boulardii 250 milliGRAM(s) Oral two times a day  tamsulosin 0.4 milliGRAM(s) Oral at bedtime    MEDICATIONS  (PRN):  acetaminophen     Tablet .. 650 milliGRAM(s) Oral every 6 hours PRN Temp greater or equal to 38C (100.4F)  loperamide 2 milliGRAM(s) Oral four times a day PRN Diarrhea  ondansetron Injectable 4 milliGRAM(s) IV Push four times a day PRN Nausea and/or Vomiting      Allergies    fish (Hives)  No Known Drug Allergies    PHYSICAL EXAM:   Vital Signs:  Vital Signs Last 24 Hrs  T(C): 36.6 (2022 05:00), Max: 37.1 (2022 20:29)  T(F): 97.8 (2022 05:00), Max: 98.7 (2022 20:29)  HR: 70 (2022 08:47) (50 - 70)  BP: 134/77 (2022 08:47) (132/76 - 137/67)  BP(mean): --  RR: 18 (2022 05:00) (18 - 19)  SpO2: 94% (2022 05:00) (93% - 94%)  Daily     Daily Weight in k (2022 05:00)    GENERAL:  Appears stated age  ABDOMEN:  Soft, non-tender, non-distended  NEURO:  Alert      LABS:                        12.2   3.78  )-----------( 169      ( 2022 09:39 )             36.7     02-    144  |  115<H>  |  8   ----------------------------<  114<H>  3.3<L>   |  25  |  0.96    Ca    8.4<L>      2022 09:39  
[facility], Division of Infectious Diseases  DELGADO Rodriguez Y. Patel, S. Shah, G. Casimir  [phone]  ([phone] - weekdays after 5pm and weekends)    Name: SARAH MORRISON  Age/Gender: 51y Male  MRN: [id number]    Interval History:  Patient seen this morning.   Feels well, no new complaints  Notes reviewed  No concerning overnight events  Afebrile     Allergies: fish (Hives)  No Known Drug Allergies    Objective:  Vitals:   T(F): 98.1 (02-04-22 @ 04:50), Max: 98.1 (02-04-22 @ 04:50)  HR: 60 (02-04-22 @ 04:50) (60 - 73)  BP: 120/69 (02-04-22 @ 04:50) (120/69 - 144/68)  RR: 18 (02-04-22 @ 04:50) (16 - 18)  SpO2: 93% (02-04-22 @ 04:50) (93% - 97%)  Physical Examination:  General: no acute distress  HEENT: NC/AT, anicteric, neck supple  Respiratory: no acc muscle use, breathing comfortably  Cardiovascular: S1 and S2 present  Gastrointestinal: normal appearing, nondistended  Extremities: no edema, no cyanosis  Skin: no visible rash    Laboratory Studies:  CBC:                       13.7   7.97  )-----------( 219      ( 03 Feb 2022 11:05 )             39.4     WBC Trend:  7.97 02-03-22 @ 11:05  3.78 02-01-22 @ 09:39  3.04 01-31-22 @ 09:40  9.53 01-28-22 @ 16:44    CMP: 02-03    140  |  108  |  12  ----------------------------<  176<H>  4.1   |  26  |  0.96    Ca    9.0      03 Feb 2022 11:05    Microbiology: reviewed   GI PCR Panel, Stool (collected 01-30-22 @ 00:40)  Source: .Stool Feces  Final Report (01-30-22 @ 13:13):    Rotavirus A    DETECTED by PCR    *******Please Note:*******    GI panel PCR evaluates for:    Campylobacter, Plesiomonas shigelloides, Salmonella,    Vibrio, Yersinia enterocolitica, Enteroaggregative    Escherichia coli (EAEC), Enteropathogenic E.coli (EPEC),    Enterotoxigenic E. coli (ETEC) lt/st, Shiga-like    toxin-producing E. coli (STEC) stx1/stx2,    Shigella/ Enteroinvasive E. coli (EIEC), Cryptosporidium,    Cyclospora cayetanensis, Entamoeba histolytica,    Giardia lamblia, Adenovirus F 40/41, Astrovirus,    Norovirus GI/GII, Rotavirus A, Sapovirus    Culture - Stool (collected 01-30-22 @ 00:40)  Source: .Stool Feces  Final Report (02-01-22 @ 10:09):    No enteric pathogens isolated.    (Stool culture examined for Salmonella,    Shigella, Campylobacter, Aeromonas, Plesiomonas,    Vibrio, E.coli O157 and Yersinia)    Culture - Blood (collected 01-28-22 @ 20:59)  Source: .Blood Blood-Peripheral  Final Report (02-02-22 @ 21:00):    No Growth Final    Culture - Blood (collected 01-28-22 @ 20:59)  Source: .Blood Blood-Peripheral  Final Report (02-02-22 @ 21:00):    No Growth Final    Radiology: reviewed     Medications:  acetaminophen     Tablet .. 650 milliGRAM(s) Oral every 6 hours PRN  apixaban 5 milliGRAM(s) Oral two times a day  aspirin  chewable 81 milliGRAM(s) Oral daily  atorvastatin 40 milliGRAM(s) Oral at bedtime  bethanechol 25 milliGRAM(s) Oral three times a day  dextrose 40% Gel 15 Gram(s) Oral once  dextrose 50% Injectable 25 Gram(s) IV Push once  dextrose 50% Injectable 12.5 Gram(s) IV Push once  dextrose 50% Injectable 25 Gram(s) IV Push once  famotidine    Tablet 20 milliGRAM(s) Oral daily  gabapentin 100 milliGRAM(s) Oral daily  glucagon  Injectable 1 milliGRAM(s) IntraMuscular once  insulin glargine Injectable (LANTUS) 10 Unit(s) SubCutaneous at bedtime  insulin lispro (ADMELOG) corrective regimen sliding scale   SubCutaneous three times a day before meals  insulin lispro (ADMELOG) corrective regimen sliding scale   SubCutaneous at bedtime  isosorbide   mononitrate ER Tablet (IMDUR) 30 milliGRAM(s) Oral daily  lactobacillus acidophilus 1 Tablet(s) Oral three times a day  loperamide 2 milliGRAM(s) Oral four times a day PRN  losartan 25 milliGRAM(s) Oral daily  methimazole 10 milliGRAM(s) Oral daily  metoprolol succinate ER 50 milliGRAM(s) Oral daily  multivitamin 1 Tablet(s) Oral daily  ondansetron Injectable 4 milliGRAM(s) IV Push four times a day PRN  saccharomyces boulardii 250 milliGRAM(s) Oral two times a day  tamsulosin 0.4 milliGRAM(s) Oral at bedtime    Antimicrobials:  
pt seen  feeling better  as per pt. stool thickening and lessening   ICU Vital Signs Last 24 Hrs  T(C): 36.4 (03 Feb 2022 05:33), Max: 36.9 (02 Feb 2022 11:46)  T(F): 97.6 (03 Feb 2022 05:33), Max: 98.5 (02 Feb 2022 11:46)  HR: 54 (03 Feb 2022 05:33) (54 - 70)  BP: 121/64 (03 Feb 2022 05:33) (121/64 - 153/83)  BP(mean): --  ABP: --  ABP(mean): --  RR: 18 (03 Feb 2022 05:33) (18 - 18)  SpO2: 93% (03 Feb 2022 05:33) (93% - 95%)  gen- NAD  resp-clear  abd-soft NT/ND                          12.2   3.78  )-----------( 169      ( 01 Feb 2022 09:39 )             36.7   
SUBJECTIVE:  Patient seen and examined at bedside.  No overnight events.  Patient with complaint of persistent R sided abdominal pain, tolerating clear diet, admits to flatus and numerous bowel movement.  Patient denies any fevers, chills, chest pain, shortness of breath, nausea, vomiting or diarrhea.    Vital Signs Last 24 Hrs  T(C): 36.8 (2022 05:06), Max: 37.9 (2022 17:32)  T(F): 98.3 (2022 05:06), Max: 100.2 (2022 17:32)  HR: 91 (2022 05:06) (81 - 91)  BP: 142/92 (2022 05:06) (112/61 - 171/82)  BP(mean): --  RR: 16 (2022 05:06) (15 - 16)  SpO2: 94% (2022 05:06) (93% - 98%)    PHYSICAL EXAM:  GENERAL: No acute distress, well-developed  HEAD:  Atraumatic, Normocephalic  ABDOMEN: Soft, tender R abdomen, minimally-distended; bowel sounds+  NEUROLOGY: A&O x 3, no focal deficits    I&O's Summary    I&O's Detail    MEDICATIONS  (STANDING):  apixaban 5 milliGRAM(s) Oral two times a day  aspirin  chewable 81 milliGRAM(s) Oral daily  atorvastatin 40 milliGRAM(s) Oral at bedtime  bethanechol 25 milliGRAM(s) Oral three times a day  cholestyramine Powder (Sugar-Free) 4 Gram(s) Oral daily  dextrose 40% Gel 15 Gram(s) Oral once  dextrose 50% Injectable 25 Gram(s) IV Push once  dextrose 50% Injectable 12.5 Gram(s) IV Push once  dextrose 50% Injectable 25 Gram(s) IV Push once  famotidine    Tablet 20 milliGRAM(s) Oral daily  gabapentin 100 milliGRAM(s) Oral daily  glucagon  Injectable 1 milliGRAM(s) IntraMuscular once  insulin glargine Injectable (LANTUS) 10 Unit(s) SubCutaneous two times a day  insulin lispro (ADMELOG) corrective regimen sliding scale   SubCutaneous three times a day before meals  insulin lispro (ADMELOG) corrective regimen sliding scale   SubCutaneous at bedtime  isosorbide   mononitrate ER Tablet (IMDUR) 30 milliGRAM(s) Oral daily  lactobacillus acidophilus 1 Tablet(s) Oral three times a day  losartan 25 milliGRAM(s) Oral daily  methimazole 10 milliGRAM(s) Oral daily  metoprolol succinate ER 50 milliGRAM(s) Oral daily  multivitamin 1 Tablet(s) Oral daily  tamsulosin 0.4 milliGRAM(s) Oral at bedtime  vancomycin    Solution 125 milliGRAM(s) Oral every 6 hours    MEDICATIONS  (PRN):  acetaminophen     Tablet .. 650 milliGRAM(s) Oral every 6 hours PRN Temp greater or equal to 38C (100.4F)  morphine  - Injectable 2 milliGRAM(s) IV Push every 6 hours PRN Severe Pain (7 - 10)  ondansetron Injectable 4 milliGRAM(s) IV Push four times a day PRN Nausea and/or Vomiting    LABS:                        16.4   9.53  )-----------( 214      ( 2022 16:44 )             48.7         137  |  105  |  22  ----------------------------<  186<H>  4.2   |  25  |  1.20    Ca    9.2      2022 16:44    TPro  8.4<H>  /  Alb  3.8  /  TBili  0.5  /  DBili  x   /  AST  18  /  ALT  26  /  AlkPhos  134<H>        Urinalysis Basic - ( 2022 16:44 )    Color: Yellow / Appearance: Slightly Turbid / S.015 / pH: x  Gluc: x / Ketone: Trace  / Bili: Small / Urobili: Negative   Blood: x / Protein: 30 mg/dL / Nitrite: Negative   Leuk Esterase: Moderate / RBC: 11-25 /HPF / WBC 11-25   Sq Epi: x / Non Sq Epi: Occasional / Bacteria: Moderate      RADIOLOGY & ADDITIONAL STUDIES:
CAPILLARY BLOOD GLUCOSE      POCT Blood Glucose.: 131 mg/dL (02 Feb 2022 12:07)  POCT Blood Glucose.: 124 mg/dL (02 Feb 2022 08:07)  POCT Blood Glucose.: 156 mg/dL (01 Feb 2022 21:53)  POCT Blood Glucose.: 136 mg/dL (01 Feb 2022 16:38)      Vital Signs Last 24 Hrs  T(C): 36.9 (02 Feb 2022 11:46), Max: 37.1 (01 Feb 2022 20:29)  T(F): 98.5 (02 Feb 2022 11:46), Max: 98.7 (01 Feb 2022 20:29)  HR: 57 (02 Feb 2022 11:46) (52 - 70)  BP: 153/83 (02 Feb 2022 11:46) (132/76 - 153/83)  BP(mean): --  RR: 18 (02 Feb 2022 11:46) (18 - 18)  SpO2: 94% (02 Feb 2022 11:46) (93% - 94%)    Respiratory: CTA B/L  CV: RRR, S1S2, no murmurs, rubs or gallops  Abdominal: Soft, NT, ND +BS, Last BM  Extremities: No edema, + peripheral pulses     02-01    144  |  115<H>  |  8   ----------------------------<  114<H>  3.3<L>   |  25  |  0.96    Ca    8.4<L>      01 Feb 2022 09:39        atorvastatin 40 milliGRAM(s) Oral at bedtime  cholestyramine Powder (Sugar-Free) 4 Gram(s) Oral daily  dextrose 40% Gel 15 Gram(s) Oral once  dextrose 50% Injectable 25 Gram(s) IV Push once  dextrose 50% Injectable 12.5 Gram(s) IV Push once  dextrose 50% Injectable 25 Gram(s) IV Push once  glucagon  Injectable 1 milliGRAM(s) IntraMuscular once  insulin glargine Injectable (LANTUS) 10 Unit(s) SubCutaneous at bedtime  insulin lispro (ADMELOG) corrective regimen sliding scale   SubCutaneous three times a day before meals  insulin lispro (ADMELOG) corrective regimen sliding scale   SubCutaneous at bedtime  methimazole 10 milliGRAM(s) Oral daily  
Patient is a 51y old  Male who presents with a chief complaint of diarrhea (2022 12:29)  still complains of abdominal discomfort and diarrhea  c diff neg      INTERVAL HPI/OVERNIGHT EVENTS:  T(C): 36.8 (22 @ 05:06), Max: 37.9 (22 @ 17:32)  HR: 75 (22 @ 13:05) (75 - 91)  BP: 121/75 (22 @ 13:05) (121/75 - 171/82)  RR: 16 (22 @ 05:06) (15 - 16)  SpO2: 95% (22 @ 13:05) (93% - 98%)  Wt(kg): --  I&O's Summary      LABS:                        16.4   9.53  )-----------( 214      ( 2022 16:44 )             48.7         137  |  105  |  22  ----------------------------<  186<H>  4.2   |  25  |  1.20    Ca    9.2      2022 16:44    TPro  8.4<H>  /  Alb  3.8  /  TBili  0.5  /  DBili  x   /  AST  18  /  ALT  26  /  AlkPhos  134<H>        Urinalysis Basic - ( 2022 16:44 )    Color: Yellow / Appearance: Slightly Turbid / S.015 / pH: x  Gluc: x / Ketone: Trace  / Bili: Small / Urobili: Negative   Blood: x / Protein: 30 mg/dL / Nitrite: Negative   Leuk Esterase: Moderate / RBC: 11-25 /HPF / WBC 11-25   Sq Epi: x / Non Sq Epi: Occasional / Bacteria: Moderate      CAPILLARY BLOOD GLUCOSE      POCT Blood Glucose.: 126 mg/dL (2022 11:59)  POCT Blood Glucose.: 121 mg/dL (2022 07:47)  POCT Blood Glucose.: 165 mg/dL (2022 21:06)  POCT Blood Glucose.: 144 mg/dL (2022 17:24)        Urinalysis Basic - ( 2022 16:44 )    Color: Yellow / Appearance: Slightly Turbid / S.015 / pH: x  Gluc: x / Ketone: Trace  / Bili: Small / Urobili: Negative   Blood: x / Protein: 30 mg/dL / Nitrite: Negative   Leuk Esterase: Moderate / RBC: 11-25 /HPF / WBC 11-25   Sq Epi: x / Non Sq Epi: Occasional / Bacteria: Moderate        MEDICATIONS  (STANDING):  apixaban 5 milliGRAM(s) Oral two times a day  aspirin  chewable 81 milliGRAM(s) Oral daily  atorvastatin 40 milliGRAM(s) Oral at bedtime  bethanechol 25 milliGRAM(s) Oral three times a day  cefTRIAXone   IVPB 1000 milliGRAM(s) IV Intermittent every 24 hours  cholestyramine Powder (Sugar-Free) 4 Gram(s) Oral daily  dextrose 40% Gel 15 Gram(s) Oral once  dextrose 50% Injectable 25 Gram(s) IV Push once  dextrose 50% Injectable 12.5 Gram(s) IV Push once  dextrose 50% Injectable 25 Gram(s) IV Push once  famotidine    Tablet 20 milliGRAM(s) Oral daily  gabapentin 100 milliGRAM(s) Oral daily  glucagon  Injectable 1 milliGRAM(s) IntraMuscular once  insulin glargine Injectable (LANTUS) 10 Unit(s) SubCutaneous two times a day  insulin lispro (ADMELOG) corrective regimen sliding scale   SubCutaneous three times a day before meals  insulin lispro (ADMELOG) corrective regimen sliding scale   SubCutaneous at bedtime  isosorbide   mononitrate ER Tablet (IMDUR) 30 milliGRAM(s) Oral daily  lactobacillus acidophilus 1 Tablet(s) Oral three times a day  losartan 25 milliGRAM(s) Oral daily  methimazole 10 milliGRAM(s) Oral daily  metoprolol succinate ER 50 milliGRAM(s) Oral daily  metroNIDAZOLE  IVPB      metroNIDAZOLE  IVPB 500 milliGRAM(s) IV Intermittent once  metroNIDAZOLE  IVPB 500 milliGRAM(s) IV Intermittent every 8 hours  multivitamin 1 Tablet(s) Oral daily  saccharomyces boulardii 250 milliGRAM(s) Oral two times a day  tamsulosin 0.4 milliGRAM(s) Oral at bedtime    MEDICATIONS  (PRN):  acetaminophen     Tablet .. 650 milliGRAM(s) Oral every 6 hours PRN Temp greater or equal to 38C (100.4F)  morphine  - Injectable 2 milliGRAM(s) IV Push every 6 hours PRN Severe Pain (7 - 10)  ondansetron Injectable 4 milliGRAM(s) IV Push four times a day PRN Nausea and/or Vomiting      REVIEW OF SYSTEMS:  CONSTITUTIONAL: No fever, weight loss, or fatigue  EYES: No eye pain, visual disturbances, or discharge  ENMT:  No difficulty hearing, tinnitus, vertigo; No sinus or throat pain  NECK: No pain or stiffness  RESPIRATORY: No cough, wheezing, chills or hemoptysis; No shortness of breath  CARDIOVASCULAR: No chest pain, palpitations, dizziness, or leg swelling  GASTROINTESTINAL: diarrhea and diffuse abd discomfort  GENITOURINARY: No dysuria, frequency, hematuria, or incontinence  NEUROLOGICAL: No headaches, memory loss, loss of strength, numbness, or tremors  SKIN: No itching, burning, rashes, or lesions   LYMPH NODES: No enlarged glands  ENDOCRINE: No heat or cold intolerance; No hair loss  MUSCULOSKELETAL: No joint pain or swelling; No muscle, back, or extremity pain  PSYCHIATRIC: No depression, anxiety, mood swings, or difficulty sleeping  HEME/LYMPH: No easy bruising, or bleeding gums  ALLERY AND IMMUNOLOGIC: No hives or eczema    RADIOLOGY & ADDITIONAL TESTS:    Imaging Personally Reviewed:  [ x] YES  [ ] NO    Consultant(s) Notes Reviewed:  [x ] YES  [ ] NO    PHYSICAL EXAM:  GENERAL: NAD, well-groomed, well-developed  HEAD:  Atraumatic, Normocephalic  EYES: EOMI, PERRLA, conjunctiva and sclera clear  ENMT: No tonsillar erythema, exudates, or enlargement; Moist mucous membranes, Good dentition, No lesions  NECK: Supple, No JVD, Normal thyroid  NERVOUS SYSTEM:  Alert & Oriented X3, Good concentration; Motor Strength 5/5 B/L upper and lower extremities; DTRs 2+ intact and symmetric  CHEST/LUNG: Clear to percussion bilaterally; No rales, rhonchi, wheezing, or rubs  HEART: Regular rate and rhythm; No murmurs, rubs, or gallops  ABDOMEN: Soft,mild diffuse tenderness to palp  EXTREMITIES:  2+ Peripheral Pulses, No clubbing, cyanosis, or edema  LYMPH: No lymphadenopathy noted  SKIN: No rashes or lesions    Care Discussed with Consultants/Other Providers [x ] YES  [ ] NO    advance care planning and advance directives discussed, including but not limited to long term care planning [x]YES   [ ]NO
Patient is a 51y old  Male who presents with a chief complaint of Abdominal pain, nausea, vomiting, diarrhea and fever (01 Feb 2022 15:49)  diarrhea much improved    INTERVAL HPI/OVERNIGHT EVENTS:  T(C): 36.6 (02-02-22 @ 05:00), Max: 37.1 (02-01-22 @ 20:29)  HR: 70 (02-02-22 @ 08:47) (50 - 70)  BP: 134/77 (02-02-22 @ 08:47) (132/76 - 137/67)  RR: 18 (02-02-22 @ 05:00) (18 - 19)  SpO2: 94% (02-02-22 @ 05:00) (93% - 94%)  Wt(kg): --  I&O's Summary    01 Feb 2022 07:01  -  02 Feb 2022 07:00  --------------------------------------------------------  IN: 0 mL / OUT: 3400 mL / NET: -3400 mL        LABS:                        12.2   3.78  )-----------( 169      ( 01 Feb 2022 09:39 )             36.7     02-01    144  |  115<H>  |  8   ----------------------------<  114<H>  3.3<L>   |  25  |  0.96    Ca    8.4<L>      01 Feb 2022 09:39          CAPILLARY BLOOD GLUCOSE      POCT Blood Glucose.: 124 mg/dL (02 Feb 2022 08:07)  POCT Blood Glucose.: 156 mg/dL (01 Feb 2022 21:53)  POCT Blood Glucose.: 136 mg/dL (01 Feb 2022 16:38)  POCT Blood Glucose.: 111 mg/dL (01 Feb 2022 11:53)            MEDICATIONS  (STANDING):  apixaban 5 milliGRAM(s) Oral two times a day  aspirin  chewable 81 milliGRAM(s) Oral daily  atorvastatin 40 milliGRAM(s) Oral at bedtime  bethanechol 25 milliGRAM(s) Oral three times a day  cholestyramine Powder (Sugar-Free) 4 Gram(s) Oral daily  dextrose 40% Gel 15 Gram(s) Oral once  dextrose 50% Injectable 25 Gram(s) IV Push once  dextrose 50% Injectable 12.5 Gram(s) IV Push once  dextrose 50% Injectable 25 Gram(s) IV Push once  famotidine    Tablet 20 milliGRAM(s) Oral daily  gabapentin 100 milliGRAM(s) Oral daily  glucagon  Injectable 1 milliGRAM(s) IntraMuscular once  insulin glargine Injectable (LANTUS) 10 Unit(s) SubCutaneous at bedtime  insulin lispro (ADMELOG) corrective regimen sliding scale   SubCutaneous three times a day before meals  insulin lispro (ADMELOG) corrective regimen sliding scale   SubCutaneous at bedtime  isosorbide   mononitrate ER Tablet (IMDUR) 30 milliGRAM(s) Oral daily  lactobacillus acidophilus 1 Tablet(s) Oral three times a day  losartan 25 milliGRAM(s) Oral daily  methimazole 10 milliGRAM(s) Oral daily  metoprolol succinate ER 50 milliGRAM(s) Oral daily  multivitamin 1 Tablet(s) Oral daily  saccharomyces boulardii 250 milliGRAM(s) Oral two times a day  tamsulosin 0.4 milliGRAM(s) Oral at bedtime    MEDICATIONS  (PRN):  acetaminophen     Tablet .. 650 milliGRAM(s) Oral every 6 hours PRN Temp greater or equal to 38C (100.4F)  loperamide 2 milliGRAM(s) Oral four times a day PRN Diarrhea  ondansetron Injectable 4 milliGRAM(s) IV Push four times a day PRN Nausea and/or Vomiting      REVIEW OF SYSTEMS:  CONSTITUTIONAL: No fever, weight loss, or fatigue  EYES: No eye pain, visual disturbances, or discharge  ENMT:  No difficulty hearing, tinnitus, vertigo; No sinus or throat pain  NECK: No pain or stiffness  RESPIRATORY: No cough, wheezing, chills or hemoptysis; No shortness of breath  CARDIOVASCULAR: No chest pain, palpitations, dizziness, or leg swelling  GASTROINTESTINAL: No abdominal or epigastric pain. No nausea, vomiting, or hematemesis; No diarrhea or constipation. No melena or hematochezia.  GENITOURINARY: No dysuria, frequency, hematuria, or incontinence  NEUROLOGICAL: No headaches, memory loss, loss of strength, numbness, or tremors  SKIN: No itching, burning, rashes, or lesions   LYMPH NODES: No enlarged glands  ENDOCRINE: No heat or cold intolerance; No hair loss  MUSCULOSKELETAL: No joint pain or swelling; No muscle, back, or extremity pain  PSYCHIATRIC: No depression, anxiety, mood swings, or difficulty sleeping  HEME/LYMPH: No easy bruising, or bleeding gums  ALLERY AND IMMUNOLOGIC: No hives or eczema    RADIOLOGY & ADDITIONAL TESTS:    Imaging Personally Reviewed:  [x ] YES  [ ] NO    Consultant(s) Notes Reviewed:  [x ] YES  [ ] NO    PHYSICAL EXAM:  GENERAL: NAD, well-groomed, well-developed  HEAD:  Atraumatic, Normocephalic  EYES: EOMI, PERRLA, conjunctiva and sclera clear  ENMT: No tonsillar erythema, exudates, or enlargement; Moist mucous membranes, Good dentition, No lesions  NECK: Supple, No JVD, Normal thyroid  NERVOUS SYSTEM:  Alert & Oriented X3, Good concentration; Motor Strength 5/5 B/L upper and lower extremities; DTRs 2+ intact and symmetric  CHEST/LUNG: Clear to percussion bilaterally; No rales, rhonchi, wheezing, or rubs  HEART: Regular rate and rhythm; No murmurs, rubs, or gallops  ABDOMEN: Soft, Nontender, Nondistended; Bowel sounds present  EXTREMITIES:  2+ Peripheral Pulses, No clubbing, cyanosis, or edema  LYMPH: No lymphadenopathy noted  SKIN: No rashes or lesions    Care Discussed with Consultants/Other Providers [x ] YES  [ ] NO
Patient is a 51y old  Male who presents with a chief complaint of Abdominal pain, nausea, vomiting, diarrhea and fever (31 Jan 2022 11:23)      INTERVAL /OVERNIGHT EVENTS: still with diarhea    MEDICATIONS  (STANDING):  apixaban 5 milliGRAM(s) Oral two times a day  aspirin  chewable 81 milliGRAM(s) Oral daily  atorvastatin 40 milliGRAM(s) Oral at bedtime  bethanechol 25 milliGRAM(s) Oral three times a day  cholestyramine Powder (Sugar-Free) 4 Gram(s) Oral daily  dextrose 40% Gel 15 Gram(s) Oral once  dextrose 50% Injectable 25 Gram(s) IV Push once  dextrose 50% Injectable 12.5 Gram(s) IV Push once  dextrose 50% Injectable 25 Gram(s) IV Push once  famotidine    Tablet 20 milliGRAM(s) Oral daily  gabapentin 100 milliGRAM(s) Oral daily  glucagon  Injectable 1 milliGRAM(s) IntraMuscular once  insulin glargine Injectable (LANTUS) 10 Unit(s) SubCutaneous at bedtime  insulin lispro (ADMELOG) corrective regimen sliding scale   SubCutaneous three times a day before meals  insulin lispro (ADMELOG) corrective regimen sliding scale   SubCutaneous at bedtime  isosorbide   mononitrate ER Tablet (IMDUR) 30 milliGRAM(s) Oral daily  lactobacillus acidophilus 1 Tablet(s) Oral three times a day  losartan 25 milliGRAM(s) Oral daily  methimazole 10 milliGRAM(s) Oral daily  metoprolol succinate ER 50 milliGRAM(s) Oral daily  multivitamin 1 Tablet(s) Oral daily  saccharomyces boulardii 250 milliGRAM(s) Oral two times a day  tamsulosin 0.4 milliGRAM(s) Oral at bedtime    MEDICATIONS  (PRN):  acetaminophen     Tablet .. 650 milliGRAM(s) Oral every 6 hours PRN Temp greater or equal to 38C (100.4F)  loperamide 2 milliGRAM(s) Oral four times a day PRN Diarrhea  ondansetron Injectable 4 milliGRAM(s) IV Push four times a day PRN Nausea and/or Vomiting      Allergies    fish (Hives)  No Known Drug Allergies    Intolerances        REVIEW OF SYSTEMS:  CONSTITUTIONAL: No fever, weight loss, or fatigue  EYES: No eye pain, visual disturbances, or discharge  ENMT:  No difficulty hearing, tinnitus, vertigo; No sinus or throat pain  NECK: No pain or stiffness  RESPIRATORY: No cough, wheezing, chills or hemoptysis; No shortness of breath  CARDIOVASCULAR: No chest pain, palpitations, dizziness, or leg swelling  GASTROINTESTINAL: No abdominal or epigastric pain. No nausea, vomiting, or hematemesis; + diarrhea   GENITOURINARY: No dysuria, frequency, hematuria, or incontinence  NEUROLOGICAL: No headaches, memory loss, loss of strength, numbness, or tremors  SKIN: No itching, burning, rashes, or lesions   LYMPH NODES: No enlarged glands  ENDOCRINE: No heat or cold intolerance; No hair loss; No polydipsia or polyuria  MUSCULOSKELETAL: No joint pain or swelling; No muscle, back, or extremity pain  PSYCHIATRIC: No depression, anxiety, mood swings, or difficulty sleeping  HEME/LYMPH: No easy bruising, or bleeding gums  ALLERGY AND IMMUNOLOGIC: No hives or eczema    Vital Signs Last 24 Hrs  T(C): 36.7 (31 Jan 2022 13:11), Max: 37 (30 Jan 2022 21:52)  T(F): 98 (31 Jan 2022 13:11), Max: 98.6 (30 Jan 2022 21:52)  HR: 61 (31 Jan 2022 13:11) (61 - 74)  BP: 153/96 (31 Jan 2022 13:11) (103/60 - 158/79)  BP(mean): --  RR: 17 (31 Jan 2022 13:11) (17 - 17)  SpO2: 93% (31 Jan 2022 13:11) (93% - 95%)    PHYSICAL EXAM:  GENERAL: NAD, well-groomed, well-developed  HEAD:  Atraumatic, Normocephalic  EYES: EOMI, PERRLA, conjunctiva and sclera clear  ENMT: No tonsillar erythema, exudates, or enlargement; Moist mucous membranes, Good dentition, No lesions  NECK: Supple, No JVD, Normal thyroid  NERVOUS SYSTEM:  Alert & Oriented X3, Good concentration; Motor Strength 5/5 B/L upper and lower extremities; DTRs 2+ intact and symmetric  CHEST/LUNG: Clear to auscultation bilaterally; No rales, rhonchi, wheezing, or rubs  HEART: Regular rate and rhythm; No murmurs, rubs, or gallops  ABDOMEN: Soft, Nontender, Nondistended; Bowel sounds present  EXTREMITIES:  2+ Peripheral Pulses, No clubbing, cyanosis, or edema  LYMPH: No lymphadenopathy noted  SKIN: No rashes or lesions    LABS:                        12.2   3.04  )-----------( 161      ( 31 Jan 2022 09:40 )             36.7     31 Jan 2022 09:40    143    |  115    |  8      ----------------------------<  115    3.5     |  24     |  1.00     Ca    8.2        31 Jan 2022 09:40    TPro  6.4    /  Alb  2.8    /  TBili  0.2    /  DBili  <0.1   /  AST  78     /  ALT  45     /  AlkPhos  81     31 Jan 2022 09:40        CAPILLARY BLOOD GLUCOSE      POCT Blood Glucose.: 119 mg/dL (31 Jan 2022 16:43)  POCT Blood Glucose.: 130 mg/dL (31 Jan 2022 11:54)  POCT Blood Glucose.: 112 mg/dL (31 Jan 2022 08:32)  POCT Blood Glucose.: 93 mg/dL (31 Jan 2022 07:47)  POCT Blood Glucose.: 163 mg/dL (30 Jan 2022 22:19)      RADIOLOGY & ADDITIONAL TESTS:    Notes Reviewed:  [x ] YES  [ ] NO    Care Discussed with Consultants/Other Providers [x ] YES  [ ] NO

## 2022-02-04 NOTE — DIETITIAN INITIAL EVALUATION ADULT. - HEIGHT FOR BMI (FEET)
Patient Education       Cough, Runny Nose, and the Common Cold Discharge Instructions   About this topic   The common cold is an infection in the nose and throat. A tiny germ, called a virus, causes this infection. It often affects your nose, throat, ears, and sinuses. A cold can easily spread from person to person. Coughing, sneezing, or touching something with the germ on it spreads the cold.  Most colds go away on their own without treatment. Antibiotics will not help a cold. Your doctor may order drugs to help with the signs of a cold. Even though you may feel very sick, the common cold is not a health emergency.         What care is needed at home?   · Ask your doctor what you need to do when you go home. Make sure you ask questions if you do not understand what the doctor says.  · To help you feel better:  ? Use a cool mist humidifier to avoid breathing dry air.  ? Use hard candy or cough drops to soothe sore throat and cough.  ? Gargle with salt water. Mix 1/2 teaspoon (2.5 grams) salt with a cup (240 mL) of warm water a few times a day.  ? Spray saltwater mist in each nostril. Any normal saline spray works.  ? Sip warm liquids to keep your throat moist.  ? Take warm, steamy showers to help soothe the cough.  · Do not smoke or be in smoke-filled places. Avoid things that may cause breathing problems like vaping, fumes, pollution, dust, and other common allergens.  · You may want to use over-the-counter medicines for allergies or acid reflux if your cough is due to one of these problems.  · You can also use an over-the-counter cough medicine.  · Drink plenty of fluids whenever you are thirsty.  What follow-up care is needed?   Your doctor may ask you to make visits to the office to check on your progress. Be sure to keep these visits.  What drugs may be needed?   Your doctor may order drugs to:  · Help a stuffy nose  · Lower a fever  · Help with pain  · Treat an allergy  · Help with cough  Always talk to your  doctor before you take any drugs. This includes over-the-counter (OTC) drugs and herbal supplements. This is very important if you have high blood pressure.  Will physical activity be limited?   Get lots of rest. Sleep when you are feeling tired. Avoid doing tiring activities.  What changes to diet are needed?   · Chicken soup may be helpful. Warm fluids help thin mucus and help the body get rid of it easier.  What problems could happen?   · Colds may cause signs of asthma for people who have asthma.  · Sinus or ear infection  · Lung infections  · Bronchitis  What can be done to prevent this health problem?   · Wash your hands often with soap and water for at least 20 seconds, especially after coughing or sneezing. Alcohol-based hand sanitizers also work to kill the virus.  · If you are sick, cover your mouth and nose with tissue when you cough or sneeze. You can also cough into your elbow. Throw away tissues in the trash and wash your hands after touching used tissues.  · Clean items and surfaces you most often touch like door handles, remotes, phones, or toys. Wipe them with a disinfectant. This can help reduce the spread of infection.  · Do not get too close (kissing, hugging) to people who are sick.  · Do not share towels or hankies with anyone who is sick.  · Stay away from crowded places.  · Keep your hands away from your eyes and nose because the virus can enter easily to those body areas.  · Get a flu shot each year.  When do I need to call the doctor?   · You have chest pain when you cough or trouble breathing.  · You start to cough up blood or yellow or green mucus.  · You have a fever of 100.4°F (38°C) or higher or chills.  · You cough so hard you throw up.  · You are still coughing in 10 days.  Helpful tips   · It is normal that mucus from your runny nose may become thicker and change color. Do not take an antibiotic. Instead, drink more water-based fluids, especially hot tea and soups.  · Most colds go  away within a week. If you are still sick after 14 days, see your doctor.  Teach Back: Helping You Understand   The Teach Back Method helps you understand the information we are giving you. After you talk with the staff, tell them in your own words what you learned. This helps to make sure the staff has described each thing clearly. It also helps to explain things that may have been confusing. Before going home, make sure you can do these:  · I can tell you about my condition.  · I can tell you what may help ease my breathing.  · I can tell you what I can do to help avoid passing the infection to others.  · I can tell you what I will do if I have a fever, chills, or trouble breathing.  Where can I learn more?   American Academy of Family Physicians  https://familydoctor.org/condition/colds-and-the-flu/   American Lung Association  http://www.lung.org/lung-health-and-diseases/lung-disease-lookup/influenza/facts-about-the-common-cold.html   Centers for Disease Control and Prevention  https://www.cdc.gov/features/rhinoviruses/   Kids Health  http://kidshealth.org/en/parents/cold.html?ref=search&WT.ac=agk-a-slmc-np-lewvbr-kvx   Last Reviewed Date   2021-06-09  Consumer Information Use and Disclaimer   This information is not specific medical advice and does not replace information you receive from your health care provider. This is only a brief summary of general information. It does NOT include all information about conditions, illnesses, injuries, tests, procedures, treatments, therapies, discharge instructions or life-style choices that may apply to you. You must talk with your health care provider for complete information about your health and treatment options. This information should not be used to decide whether or not to accept your health care providers advice, instructions or recommendations. Only your health care provider has the knowledge and training to provide advice that is right for you.  Copyright    Copyright © 2021 [employer] and its affiliates and/or licensors. All rights reserved.     6

## 2022-02-04 NOTE — DIETITIAN INITIAL EVALUATION ADULT. - DIET TYPE
consistent carbohydrate (no snacks)/DASH/TLC (sodium and cholesterol restricted diet)/fiber/residue restricted

## 2022-02-04 NOTE — PROGRESS NOTE ADULT - PROBLEM SELECTOR PLAN 2
pain control  surgical eval with dr. grijalva
cont methimazole 10mg daily
cont methimazole 10mg daily  to f/u tfts
pain control  surgical eval with dr. valentine moreira
cont methimazole 10mg/day
pain control  surgical eval
pain control  surgical eval
pain control  surgical eval with dr. grijalva
pain control  surgical eval with dr. valentine moreira

## 2022-02-04 NOTE — PROGRESS NOTE ADULT - PROBLEM SELECTOR PLAN 1
- No leukocytosis, afebrile since yesterday afternoon  - No surgical intervention warranted at this time  - c. diff neg, f/u GI PCR  - Continue antibiotics  - Daily labs- replete electrolytes PRN  - Continue clear liquids, in light of persistent R sided tenderness would not rush to advance diet    Surgical Team Contact Information  Spectralink: Ext: [phone] or [phone]  Pager: [phone]
c diff neg  rotavirus
c diff neg  rotavirus  GI follow up
cont lantus 10 units qhs  cont admelog corrective scale coverage qac/qhs  cont cons cho diet  goal bg 100-180 in hosp setting
GI eval  c diff neg, will change to iv chava/flagyl  add questran
No leukocytosis, afebrile since yesterday afternoon  - No surgical intervention warranted at this time  - Follow up c. diff, GI PCR  - Continue antibiotics  - Daily labs- replete electrolytes PRN  - Continue clear liquids
decrease lantus 10 units qhs  cont low dose admelog corrective scale coverage qac/qhs  cont cons cho diet  goal bg 100-180 in hosp setting
cont lantus 10 units qhs  cont admelog corrective scale coverage qac/qhs  cont cons cho diet
GI eval  ? etiology  add questran
GI natalia with Dr. mcgee appreciated  c diff neg,   add questran and imodium
GI eval with Dr. sheik paul diff neg, will change to iv chava/flagyl  add questran and imodium

## 2022-02-04 NOTE — DIETITIAN INITIAL EVALUATION ADULT. - OTHER INFO
52 YO Male transferred from [facility] to the ED because of abdominal pain and fever.  Patient with PMH of CAD s/p (stent), RUL subsegmental PE (on Eliquis), PAF, s/p ex-lap for perforated antral ulcer (5/2019),  CVA, HTN and DM2, osteomyelitis, s/p left TMA 5/2020. Admitted for pancolitis now resolved and pt for discharge back [facility].

## 2022-02-04 NOTE — DISCHARGE NOTE NURSING/CASE MANAGEMENT/SOCIAL WORK - NSDCPEFALRISK_GEN_ALL_CORE
For information on Fall & Injury Prevention, visit: [email or web address] OR  [email or web address] OR  https://www.cdc.gov/steadi/patient.html

## 2022-02-04 NOTE — PROGRESS NOTE ADULT - PROBLEM SELECTOR PROBLEM 1
Diabetes mellitus type 2, uncontrolled
Pancolitis
Diabetes mellitus type 2, uncontrolled
Pancolitis
Diabetes mellitus type 2, uncontrolled
Pancolitis

## 2022-02-04 NOTE — DISCHARGE NOTE NURSING/CASE MANAGEMENT/SOCIAL WORK - NSDCVIVACCINE_GEN_ALL_CORE_FT
pneumococcal polysaccharide PPV23; 31-Oct-2019 13:45; Gabbie Andrew (RN); [employer] [id number] (Exp. Date: 07-Aug-2020); IntraMuscular; Deltoid Right.; 0.5 milliLiter(s); VIS (VIS Published: 06-Oct-2009, VIS Presented: 31-Oct-2019);

## 2022-02-04 NOTE — DISCHARGE NOTE NURSING/CASE MANAGEMENT/SOCIAL WORK - PATIENT PORTAL LINK FT
You can access the FollowMyHealth Patient Portal offered by [facility] by registering at the following website: [email or web address]. By joining [employer]’s FollowMyHealth portal, you will also be able to view your health information using other applications (apps) compatible with our system.

## 2022-02-04 NOTE — PROGRESS NOTE ADULT - ASSESSMENT
Patient is a 50 year old male with multiple medical problems admitted with fever abd pain n/v/d sec viral gastroenteritis GI PCR + Rotavirus. Cdiff negative.   Clinically better, diarrhea much improved    Recommendations:   Continue off antibiotics  GI and Surgery following  Contact isolation  Discharge planning per primary team.      Jeffery Jacobs M.D.  [facility], Division of Infectious Diseases  [phone]  After 5pm on weekdays and all day on weekends - please call [phone]

## 2022-02-04 NOTE — PROGRESS NOTE ADULT - PROBLEM SELECTOR PROBLEM 2
Abdominal pain
Abdominal pain
Hyperthyroidism
Abdominal pain
Abdominal pain
Hyperthyroidism
Abdominal pain
Hyperthyroidism
Abdominal pain

## 2022-05-09 NOTE — PROGRESS NOTE ADULT - PROBLEM/PLAN-8
DISPLAY PLAN FREE TEXT
Admission
DISPLAY PLAN FREE TEXT

## 2022-05-16 NOTE — OCCUPATIONAL THERAPY INITIAL EVALUATION ADULT - IADL RETRAINING, OT EVAL
Tourniquet time = 39 MIn.  Nylon closure, NELLY Dressing Patient will increase standing tolerance to 1-2 minutes for grooming at the sink within 1 week

## 2022-06-03 NOTE — CONSULT NOTE ADULT - ASSESSMENT
6/3/2022    This is a 74 y.o. female   Chief Complaint   Patient presents with    Depression     doing well. meds are working    Diabetes     has not been checking sugar   . HPI  This is a 73 y/o female presenting for follow-up of hyperlipidemia, DM, GERD, COPD, osteopenia,    Hyperlipidemia: Tolerating Lipitor well. No new myalgias or GI upset on atorvastatin (Lipitor). Tries to maintain low fat diet supplemented with mild exercise. GERD: Stable on daily PPI     COPD: On trelegy inhaler daily. She is using her albuterol inhaler 1-2 times per day. Continues with wheezing and shortness of breath. Continues to smoke 1 ppd. Osteoporosis: Last DEXA was 9/2021. Taking calcium and vitamin D supplement daily and alendronate weekly. Insomnia: Denies issues. On trazodone. Diabetes Mellitus Type 2: Current symptoms/problems include none. Home blood sugar records: patient does not test  Any episodes of hypoglycemia? no  Eye exam current (within one year): no   reports that she has been smoking cigarettes. She started smoking about 60 years ago. She has a 55.00 pack-year smoking history. She has never used smokeless tobacco.       Lab Results   Component Value Date    LABA1C 5.8 02/01/2022    LABA1C 5.7 08/26/2021    LABA1C 5.9 02/22/2021     Lab Results   Component Value Date    LABMICR <1.20 04/20/2021    CREATININE 0.8 02/17/2022     Lab Results   Component Value Date    ALT 13 02/17/2022    AST 11 (L) 02/17/2022     Lab Results   Component Value Date    CHOL 171 02/17/2022    TRIG 146 02/17/2022    HDL 59 02/17/2022    LDLCALC 83 02/17/2022    LDLDIRECT 176 (H) 10/12/2012        Depression- feels that mood is better with taking cymbalta 60 mg BID. Reports that she will occasionally have chest pain. She is not able to stay when she has the pain. Can be with activity or with rest. Has increased shortness of breath with activity, but thinks that is r/t her COPD.    She has seen cardiologist in the past, but never completed stress test. She is willing to complete now. She was scheduled to complete, but when she went was told that she had to reschedule and she hasn't yet. Patient Active Problem List   Diagnosis    Osteopenia-last dexa 2009 repeat 2015 (-2.4 hip)--advised tx    Colon polyp, hyperplastic,-(done 3/11-repeat 3-5 yrs--dr Cheral Holter)    Family history of colon cancer    CTS (carpal tunnel syndrome)BILATERAL-s/p surgical repair--resolved     Postmenopausal status-last mammogram 2/15 wnl last pap 12/13--wnl    Nondependent alcohol abuse, in remission    Urticaria, chronic    Tobacco abuse-advised to quit--lung cancer screening neg 5/18--repeat low dose ct 1 yr    Chronic back pain-(djd) saw dr Chester Covarrubias afford surgery--seeing dr Vi Feliciano for pain meds    Fibromyalgia    Hypercholesteremia    Lumbar spondylosis    Vitamin D deficiency--severe--started 50,000 iu 2x/ wk 11/13    Insomnia--on elevil w help    Constipation--on daily miralax    Depression    Chronic pain syndrome    Muscle cramping    Gastroesophageal reflux disease    COPD, severe ([address]    Chronic hypoxemic respiratory failure ([address]    Degeneration of lumbar or lumbosacral intervertebral disc    Trochanteric bursitis of right hip    New onset type 2 diabetes mellitus ([address]    Controlled type 2 diabetes mellitus without complication, without long-term current use of insulin ([address]    Age-related osteoporosis without current pathological fracture- started alendronate 9/2021          Current Outpatient Medications   Medication Sig Dispense Refill    DULoxetine (CYMBALTA) 60 MG extended release capsule TAKE 1 CAPSULE BY MOUTH TWICE A DAY 60 capsule 0    oxyCODONE-acetaminophen (PERCOCET) 7.5-325 MG per tablet Take 1 tablet by mouth every 6 hours as needed for Pain for up to 28 days.  Max 4 a day 112 tablet 0    pantoprazole (PROTONIX) 40 MG tablet Take 1 tablet by mouth daily 30 tablet 1    tiZANidine (ZANAFLEX) 4 MG tablet Take 1 tablet by mouth nightly 30 tablet 1    meloxicam (MOBIC) 15 MG tablet Take 1 tablet by mouth daily 30 tablet 1    traZODone (DESYREL) 50 MG tablet Take 1-2 tablets by mouth nightly 60 tablet 1    Fluticasone-Umeclidin-Vilant (TRELEGY ELLIPTA) 200-62.5-25 MCG/INH AEPB Inhale 1 Inhaler into the lungs daily 1 each 5    magnesium oxide (MGO) 400 (241.3 Mg) MG TABS tablet Take 1 tablet by mouth 2 times daily as needed (for cramping) 60 tablet 0    alendronate (FOSAMAX) 70 MG tablet TAKE 1 TABLET BY MOUTH ONE TIME PER WEEK 12 tablet 1    atorvastatin (LIPITOR) 80 MG tablet TAKE 1 TABLET BY MOUTH EVERY DAY FOR CHOLESTEROL 90 tablet 1    albuterol sulfate  (90 Base) MCG/ACT inhaler Inhale 2 puffs into the lungs every 6 hours as needed for Shortness of Breath 1 each 11    Calcium Citrate-Vitamin D 500-500 MG-UNIT CHEW Take 1 tablet by mouth 2 times daily      Blood Glucose Monitoring Suppl (ONE TOUCH ULTRA MINI) w/Device KIT 1 kit by Does not apply route daily 1 kit 0    ONE TOUCH LANCETS MISC 1 each by Does not apply route daily 100 each 3    blood glucose test strips (ASCENSIA AUTODISC VI;ONE TOUCH ULTRA TEST VI) strip 1 each by In Vitro route daily 100 each 3    ipratropium-albuterol (DUONEB) 0.5-2.5 (3) MG/3ML SOLN nebulizer solution Take 1 aerosol every 4 hours for 2 days and then as needed for shortness of breath coughing and wheezing 360 mL 3    Cholecalciferol (VITAMIN D) 2000 units TABS tablet Take 1 tablet by mouth daily 30 tablet     cetirizine (ZYRTEC) 10 MG tablet Take 10 mg by mouth daily as needed for Allergies      Nebulizers (COMPRESSOR/NEBULIZER) MISC 1 Units by Does not apply route 4 times daily 1 each 3    gabapentin (NEURONTIN) 600 MG tablet Take 1 tablet by mouth 2 times daily for 30 days. 60 tablet 1     No current facility-administered medications for this visit. No Known Allergies    Review of Systems   Constitutional: Positive for fatigue. Negative for activity change and fever. HENT: Negative for congestion and sinus pain. Eyes: Negative for itching. Respiratory: Positive for cough, shortness of breath and wheezing. Negative for chest tightness. Cardiovascular: Positive for chest pain. Negative for palpitations and leg swelling. Gastrointestinal: Negative for abdominal pain. Genitourinary: Negative for difficulty urinating, dysuria, frequency, hematuria and pelvic pain. Musculoskeletal: Positive for arthralgias and myalgias. Neurological: Negative for dizziness, syncope, weakness, numbness and headaches. Psychiatric/Behavioral: Negative for confusion. All other systems reviewed and are negative. Vitals:    06/03/22 1117   BP: 120/62   Site: Right Upper Arm   Position: Sitting   Cuff Size: Medium Adult   Pulse: 72   Resp: 18   Temp: 98.4 °F (36.9 °C)   TempSrc: Oral   SpO2: 92%   Weight: 139 lb (63 kg)   Height: 5' 4\" (1.626 m)       Body mass index is 23.86 kg/m². Wt Readings from Last 3 Encounters:   06/03/22 139 lb (63 kg)   05/10/22 136 lb (61.7 kg)   04/22/22 138 lb 6.4 oz (62.8 kg)       BP Readings from Last 3 Encounters:   06/03/22 120/62   05/10/22 (!) 163/76   04/22/22 120/74       Physical Exam  Vitals and nursing note reviewed. Constitutional:       General: She is not in acute distress. Appearance: She is well-developed. HENT:      Head: Normocephalic and atraumatic. Cardiovascular:      Rate and Rhythm: Normal rate and regular rhythm. Pulses:           Dorsalis pedis pulses are 2+ on the right side and 2+ on the left side. Posterior tibial pulses are 2+ on the right side and 2+ on the left side. Heart sounds: Normal heart sounds. No murmur heard. No friction rub. No gallop. Pulmonary:      Effort: Pulmonary effort is normal. No respiratory distress. Breath sounds: Examination of the right-upper field reveals wheezing. Examination of the left-upper field reveals wheezing. Wheezing present. Musculoskeletal:      Cervical back: Neck supple. Right lower leg: No edema. Left lower leg: No edema. Feet:      Right foot:      Protective Sensation: 10 sites tested. 10 sites sensed. Skin integrity: Skin integrity normal.      Left foot:      Protective Sensation: 10 sites tested. 10 sites sensed. Skin integrity: Skin integrity normal.   Skin:     General: Skin is warm and dry. Neurological:      Mental Status: She is alert and oriented to person, place, and time. Psychiatric:         Behavior: Behavior normal.         Thought Content: Thought content normal.         Judgment: Judgment normal.         Assessmentand Plan  Ronal Koenig was seen today for follow-up. Diagnoses and all orders for this visit:    COPD, moderate ([address]): stable  Continue f/u with pulmonologist Dr. Courtney alejo inhaler daiy and albuterol inhaler BID prn and nebulizer PRN  Smoking cessation discussed and needed. Hypercholesteremia  -     LIPIDs controled 2/2022  Continue atorvastatin 80 mg daily. Advised to continue low fat/choleserol diet supplemented with aerobic exercise. Gastroesophageal reflux disease  Continue Protonix daily. Advised to avoid foods associated with acid reflux. DM type 2: diet controlled   -     Hemoglobin A1C- 5.9%  Advised to continue low carb/fat diet supplemented with aerobic exercise. Osteoporosis-   Continue calcium and vitamin D supplements and alendronate weekly   Last DEXA scan 9/2021. Repeat in 2 years. Tobacco abuse-advised to quit--lung cancer screening neg 5/18--repeat low dose ct 1 yr  She is back smoking at least a pack per day. Quit 1/1/20. Advised smoking cessation again. She does not want to quit smoking at this time. Moderate episode of recurrent major depressive disorder (HCC)  Mood improved.  Continue cymbalta 60 mg BID    Chest pain, unspecified type/ OSBORN (dyspnea on exertion)  -     Cardiac Stress Test - w/Pharm; 48 year old male PMHx HTN, DM2 (not on meds), and Diabetic Neuropathy.  Presented with Perforated Antral Gastric Ulcer, POD 4 s/p Emergent Exp-lap Oomentopexy and plication.  Recovered in MICU and transferred to surgical  floor yesterday.    Tonight with AF w/ RVR -160's, maintaining BP.    NEURO - Stable pain Mgt     PULM - Unlabored on NC 3L               QHS Bipap               Incentive spirometry     CV - Lopressor 10mg IVP with no effect         Amio 150mg Bolus X 1          Cardizem 20mg IVP with olny short effect           may require Cardizem Gtt           Trend cardiac markers     GI - Cont NGT to LWS         Diet advanced per Sx         JO with scant serosang         Dr Mejia made aware by house staff      RENAL - UOP good, check Lytes     ID - Cultures have been Neg / Cont Zosyn     DVT - on PPX lovenox - If AF does not convert will need Full AC -            PEND UE doppler R fore arm swelling     Case d/w eICU Dr Cohen Future  Needs to reschedule. Symptoms are stable from last visit. Patient is to call or go to the ER with worsening or changing symptoms. Colon cancer screening  -     Fecal DNA Colorectal cancer screening (Cologuard)  Patient declines colonoscopy. She reports that she will complete Cologuard. Return in about 3 months (around 9/3/2022), or if symptoms worsen or fail to improve, for AWV.

## 2022-06-13 NOTE — ED ADULT TRIAGE NOTE - AS TEMP SITE
Cardiologist: Dr. Nila Boyer  Surgeon: Dr. Andrew Calvert   Surgery: Left Total Knee Replacement   Anesthesia: Spinal  Date: TBD  FAX# [phone]    # [phone]    Last OV 10/07/2021 w/Dr. Nila Boyer oral

## 2022-08-01 NOTE — ED ADULT NURSE NOTE - CAS DISCH BELONGINGS RETURNED
Impression: Type 2 diabetes mellitus without complications: R80.4. Bilateral. Plan: Diabetes type II: no background retinopathy, no signs of neovascularization noted. Discussed ocular and systemic benefits of blood sugar control. Not applicable

## 2022-08-19 NOTE — DIETITIAN INITIAL EVALUATION ADULT. - ENTER FROM (G/KG)
Pt calling c/o heart palpitations periodically over past week or so. He he states he has hx of this and had been given metoprolol in past. I advised needs to be evaluated and TJT is out of office.  Express care or ER 0.8

## 2022-09-21 NOTE — ED ADULT TRIAGE NOTE - NS ED NURSE DIRECT TO ROOM YN
Discharge teaching provided to mother. States understanding. Patient ambulatory out gait steady.    Yes

## 2022-10-03 NOTE — PROGRESS NOTE ADULT - PROBLEM SELECTOR PLAN 7
Normal Normal Normal Normal hold home medication  low dose insulin coverage scale  10units Lantus at bedtime  3units Humalog before meals  hypoglycemic protocol  consistent carb diet Normal Normal Normal Normal Normal Normal Normal Normal Normal Normal Normal Normal Normal Normal Normal Normal

## 2022-10-05 NOTE — ED PROVIDER NOTE - CADM POA PRESS ULCER
Patient Instructions      Expand All Collapse All    Thank you for attending the Palliative Care Clinic appointment today.     1. Pain:  - continue taking oxycodone 5-10mg (1 or 2 tablets) at a time. Please wait at least four hours between doses.  - please discard any leftover tramadol you have at home     2. Constipation: goal is one soft, formed stool daily  - take miralax up to twice daily  - take senna 2-4 tablets up to twice daily  - if you achieve a bowel movement with this, you don't need to take the Movantik once it arrives.     Call if your symptoms change and the medications we have prescribed are not working.   Do not take your medications at any other dose or frequently than how they are prescribed. Call if you think we should make any changes.    Call us at least 3 workdays in advance if you need a medication refill.    Please have all your pill bottles ready for your next appointment.     Return to clinic on 11/2 for a follow up.     You can reach the Palliative Care Team during business hours at the following number:    - [phone]  - or send me a [employer] message    To reach the Palliative Care Provider on-call After-hours or on holidays and weekends, call: [phone].  Please note that we are not able to provide pain medication refills on evenings or weekends.           
Unable to assess at this time due to patient’s acuity

## 2022-10-07 NOTE — ED ADULT NURSE NOTE - WITNESSED BY
friend Mastoid Interpolation Flap Text: A decision was made to reconstruct the defect utilizing an interpolation axial flap and a staged reconstruction.  A telfa template was made of the defect.  This telfa template was then used to outline the mastoid interpolation flap.  The donor area for the pedicle flap was then injected with anesthesia.  The flap was excised through the skin and subcutaneous tissue down to the layer of the underlying musculature.  The pedicle flap was carefully excised within this deep plane to maintain its blood supply.  The edges of the donor site were undermined.   The donor site was closed in a primary fashion.  The pedicle was then rotated into position and sutured.  Once the tube was sutured into place, adequate blood supply was confirmed with blanching and refill.  The pedicle was then wrapped with xeroform gauze and dressed appropriately with a telfa and gauze bandage to ensure continued blood supply and protect the attached pedicle.

## 2022-12-06 NOTE — H&P ADULT - PROBLEM SELECTOR PROBLEM 7

## 2023-01-25 NOTE — PROGRESS NOTE ADULT - ASSESSMENT
49 year old male with PAF on Eliquis, RUL subsegmental PE, hx of recent NSTEMI, perforated antral ulcer, osteomyelitis s/p debridement, HTN and DM.  He was recently admitted with chest pain and is s/p cath with mild LAD disease, 90% RCA and 100% RPL. He is now s/p BMS to RCA on 8/1/2019  He now presents with acute right sided weakness and slurred speech and is s/p tpa.      CAD recent NSTEMI   no anginal complaints   Euvolemic one exam  s/p BMS on 8/1/2019. He is now s/p 1 week course of triple therapy, and should remain on Plavix and full dose a/c.   cont Plavix    cont statin  2decho 8/2019 revealing ef 65%, mild to moderate MR, trace TR    HTN  Continue losartan    PAF  cont Amiodarone   BB held on admission for bradycardia   Thromboembolism ppx on Eliquis BID     Anemia  Anemia work up as per primary team  Given cad keep hgb >8    Neuro sp TPA probable CVA  No further neuro work up as per notes  Continue with Ac and statin  Physical therapy     - Watch creatinine and electrolytes. Keep K>4, Mg>2  - Will follow with you.  - dc planning julian pending     Yissel Alvarez FNP-C  Cardiology NP  [id number] [phone] declines

## 2023-02-01 NOTE — ED PROVIDER NOTE - EYES, MLM
Clear bilaterally, pupils equal, round and reactive to light. Ftsg Text: The defect edges were debeveled with a #15 scalpel blade.  Given the location of the defect, shape of the defect and the proximity to free margins a full thickness skin graft was deemed most appropriate.  Using a sterile surgical marker, the primary defect shape was transferred to the donor site. The area thus outlined was incised deep to adipose tissue with a #15 scalpel blade.  The harvested graft was then trimmed of adipose tissue until only dermis and epidermis was left.  The skin margins of the secondary defect were undermined to an appropriate distance in all directions utilizing iris scissors.  The secondary defect was closed with interrupted buried subcutaneous sutures.  The skin edges were then re-apposed with running  sutures.  The skin graft was then placed in the primary defect and oriented appropriately.

## 2023-02-01 NOTE — PROGRESS NOTE ADULT - PROBLEM SELECTOR PLAN 4
RISS  Hold metformin b/c pt received iv dye Spironolactone Pregnancy And Lactation Text: This medication can cause feminization of the male fetus and should be avoided during pregnancy. The active metabolite is also found in breast milk.

## 2023-03-21 NOTE — OCCUPATIONAL THERAPY INITIAL EVALUATION ADULT - EATING, PREVIOUS LEVEL OF FUNCTION, OT EVAL
[address] [facility]  Discharge- Brigid Dunbar 1939, 78 y o  male MRN: [id number]  Unit/Bed#: [facility] 622-01 Encounter: [id number]  Primary Care Provider: Blane Chen MD   Date and time admitted to hospital: 3/16/2023 10:19 AM    Fall from standing  Assessment & Plan  - mechanical fall getting out of head  - likely due to chronic R knee pain and instability  - PT/OT consult - rehab recommended   Patient declined STR  - Per case management, home PT and VNA were set up for discharge    * Multiple closed fractures of ribs of right side  Assessment & Plan  - reportedly occurred when son's patient picked him up after fall  - Multiple right-sided rib fractures, present on admission   - Continue rib fracture protocol   - Continue to encourage incentive spirometer use and adequate pulmonary hygiene  Currently pulling 2200 mL on I S   - Continue multimodal analgesic regimen  Appreciate APS evaluation and recommendations   - Supplemental oxygen via nasal cannula as needed to maintain saturations greater than or equal to 94%  - Repeat chest x-ray from 3/17/23 reviewed  - PT and OT evaluation and treatment as indicated  - Outpatient follow-up in the trauma clinic for re-evaluation in approximately 2 weeks  Pyelonephritis of transplanted kidney  Assessment & Plan  - hx of ESBL, MDRO    Cx showed MDR E  Coli  ID following - currently ceftriaxone monotherapy    Per ID: Continue IV ceftriaxone  At discharge, transition to p o  cefpodoxime (200 mg bid)  Treat x 14 days total antibiotic this time around, through 3/29        DVT prophylaxis  Assessment & Plan  - SQH    Chronic pain of right knee  Assessment & Plan  - Multimodal analgesia   - Brace provided  - Lidocaine patch placed for pain    History of organ transplantation  Assessment & Plan  - s/p renal transplant  - nephrology consulted  - urology consulted  - s/p cardiac transplant  - cardiology consulted  - continue home transplant medications  -Trend creatinine  -Tacrolimus level: WNL  -Strict I/Os    H/O urinary retention  Assessment & Plan  - Has required multiple straight caths while admitted  - Does not want chronic Weiner  - Seen by Dr Ramonita Chase from Urology on 3/19   - Weiner catheter placed 3/21  - Per urology, can restart home dose of Flomax pending orthostatic BP    - Follow up outpatient with urology  Sepsis due to urinary tract infection [facility])  Assessment & Plan  - fluid resuscitated in ED  - abx, see pyelonephritis plan  - Continue to monitor vital signs and leukocytosis   - Patient afebrile today  - WBC 10 09 3/20  - Upon discharge, cefpodoxime 200 mg bid x 14 days total, through 3/29        Bowel Regimen: Colace, Relistor, senna, Dulcolax, MiraLAX      VTE Prophylaxis:Heparin     Disposition: Medically cleared for discharge  Patient refusing rehab  PT and VNA set up per case management  Subjective   Chief Complaint: I still cannot pee    Subjective: Patient still complaining of the inability to urinate independently  States he would like to speak with urology for the placement of the Weiner catheter  Patient upset with possible discharge today as he would like to continue IV antibiotics inpatient  Explained to patient the use oral third-generation cephalosporins, patient still upset with not having continued IV antibiotics  Objective   Vitals:   Temp:  [97 5 °F (36 4 °C)-97 8 °F (36 6 °C)] 97 8 °F (36 6 °C)  HR:  [] 76  Resp:  [16-19] 16  BP: (113-150)/(61-99) 113/61    I/O       03/19 0701  03/20 0700 03/20 0701  03/21 0700 03/21 0701  03/22 0700    P  O  360  118    I V  (mL/kg)       Total Intake(mL/kg) 360 (3 9)  118 (1 3)    Urine (mL/kg/hr) 2610 (1 2) 1050 (0 5) 850 (2 1)    Stool  0     Total Output 2610 1050 850    Net -3048 -1058 -732           Unmeasured Urine Occurrence 1 x      Unmeasured Stool Occurrence  1 x            Physical Exam:   GENERAL APPEARANCE: Patient in no acute distress  HEENT: NCAT; PERRL, EOMs intact; Mucous membranes moist  NECK / BACK: ROM normal  CV: Regular rate and rhythm; no murmur/gallops/rubs appreciated  CHEST / LUNGS: Clear to auscultation; no wheezes/rales/rhonci  ABD: NABS; soft; non-distended; non-tender  : Weiner catheter placed  EXT: Right knee brace in place; +2 pulses bilaterally upper & lower extremities; no edema  NEURO: GCS 15; no focal neurologic deficits; neurovascularly intact  SKIN: Warm, dry and well perfused; no rash; no jaundice  Invasive Devices     Peripheral Intravenous Line  Duration           Peripheral IV 03/17/23 Right;Ventral (anterior) Forearm 3 days          Drain  Duration           Urethral Catheter Coude 16 Fr  <1 day                 PIC Score  PIC Pain Score: 2 (3/21/2023  8:00 AM)       If the Total PIC Score </=5, did you consult APS and evaluate patient for further intervention?: yes      Pain:    Incentive Spirometry  Cough  3 = Controlled  4 = Above goal volume 3 = Strong  2 = Moderate  3 = Goal to alert volume 2 = Weak  1 = Severe  2 = Below alert volume 1 = Absent     1 = Unable to perform IS         Lab Results:   Results: I have personally reviewed all pertinent laboratory/tests results, BMP/CMP:   Lab Results   Component Value Date    SODIUM 134 (L) 03/21/2023    K 4 3 03/21/2023     03/21/2023    CO2 24 03/21/2023    BUN 24 03/21/2023    CREATININE 1 33 (H) 03/21/2023    CALCIUM 8 8 03/21/2023    EGFR 48 03/21/2023    and CBC:   Lab Results   Component Value Date    WBC 9 46 03/21/2023    HGB 8 8 (L) 03/21/2023    HCT 28 7 (L) 03/21/2023    MCV 91 03/21/2023     03/21/2023    MCH 28 0 03/21/2023    MCHC 30 7 (L) 03/21/2023    RDW 16 4 (H) 03/21/2023    MPV 9 4 03/21/2023    NRBC 0 03/21/2023     Imaging: I have personally reviewed pertinent reports       Other Studies: None         Medical Problems     Resolved Problems  Date Reviewed: 3/21/2023   None         Admission Date:   Admission Orders "(From admission, onward)     Ordered        03/16/23 1308  Inpatient Admission  Once                        Admitting Diagnosis: Urinary retention [R33 9]  Hyponatremia [E87 1]  Rib fractures [S22 49XA]  UTI (urinary tract infection) [N39 0]  Anemia [D64 9]  CKD (chronic kidney disease) stage 3, GFR 30-59 ml/min (HCC) [N18 30]  Fever [R50 9]  Multiple rib fractures [S22 49XA]  Elevated brain natriuretic peptide (BNP) level [R79 89]  Unspecified multiple injuries, initial encounter [T07  XXXA]  Sepsis due to urinary tract infection ([facility] [address] ) [A41 9, N39 0]  Septic shock ([facility] [address] ) [A41 9, R65 21]  Renal transplant, status post [Z94 0]  Heart transplant recipient [facility]) [Z94 1]  Acute tubular injury of transplanted kidney ([facility] [address] ) [T86 19, N17 9]    HPI: Documented by Kian Chris MD on 3/16, \"Camden Rao is a 78 y o  male who presents with R chest wall pain after mechanical fall at home 2 days ago  Pt reports his R knee giving out while getting out of bed causing him to fall backward striking the back of his head on the ground  Pt reports noticing chest wall pain after his son picked him up off the floor  Denies LOC or any prodrome before the fall  Pt is not on anticoagulant medications  Denies any head pain, neck pain, abdomina pain, neurologic sxs, or nausea  \"    Procedures Performed:   Orders Placed This Encounter   Procedures   • Critical Care       Summary of Hospital Course: See above for his hospital medical chart  Significant Findings, Care, Treatment and Services Provided: Patient was seen and evaluated by trauma team and admitted with multiple right rib fractures  Patient was found to have pyelonephritis of his transplanted kidney  Nephrology and infectious disease were consulted  Patient was placed on IV cefepime vancomycin then narrowed to IV ceftriaxone for the infection  Patient also with history of urinary retention    Urology was consulted and decided to place a Weiner catheter for discharge with outpatient " follow-up  PT/OT evaluated patient and recommended discharge to postacute rehab  However, patient refused rehab placement  Case management was consulted to set up home physical therapy and VNA services  Additional consult was placed to geriatric medicine  Complications: None    Condition at Discharge: good         Discharge instructions/Information to patient and family:   See after visit summary for information provided to patient and family  Provisions for Follow-Up Care:  See after visit summary for information related to follow-up care and any pertinent home health orders  PCP: Lorenzo He MD    Disposition: Home with home PT and VNA    Planned Readmission: No    Discharge Statement   I spent 30 minutes discharging the patient  This time was spent on the day of discharge  I had direct contact with the patient on the day of discharge  Additional documentation is required if more than 30 minutes were spent on discharge  Discharge Medications:  See after visit summary for reconciled discharge medications provided to patient and family  independent

## 2023-05-12 NOTE — PROGRESS NOTE ADULT - PROBLEM SELECTOR PLAN 1
Left message for pt to return call or send MyChart message.   cont lantus 10 units daily  cont humalog 3 units 3x/day before meals  cont humalog scale coverage qac/qhs  cont cons cho diet  goal bg 100-180 in hosp setting

## 2023-05-31 NOTE — ED PROVIDER NOTE - SEXUAL ORIENTATION
Straight, Heterosexual Olanzapine Counseling- I discussed with the patient the common side effects of olanzapine including but are not limited to: lack of energy, dry mouth, increased appetite, sleepiness, tremor, constipation, dizziness, changes in behavior, or restlessness.  Explained that teenagers are more likely to experience headaches, abdominal pain, pain in the arms or legs, tiredness, and sleepiness.  Serious side effects include but are not limited: increased risk of death in elderly patients who are confused, have memory loss, or dementia-related psychosis; hyperglycemia; increased cholesterol and triglycerides; and weight gain.

## 2023-06-26 NOTE — DISCHARGE NOTE PROVIDER - INSTRUCTIONS
Physical Therapy Visit    Visit Count: 2      Precautions:      SUBJECTIVE:   Patient reports things are going pretty well.  He reports some good days and some bad days.  He has cut back to basically water for drinking.  Most nights are pretty good, but sometimes he notices more leakage than other times.  Gets up once a night.  He is mostly dry at night.  He is still wearing a depend, but it isn't very damp.  Reports he hasn't been doing his exercises enough, but he is doing them once a day.  He can stop a urine stream.  Reports he is urinating every 2-3 hours.  Returning to work July 6th, but he gets two weeks vacation shortly after that.    Current Pain (0-10 scale):  0    Functional Change:     OBJECTIVE:     Treatment:   Therapeutic Exercise:   Seated pelvic floor contractions  -1 set of 10, 10 second holds  Standing pelvic floor contractions  -1 set of 5 second holds  Bridges with pelvic floor contraction  -3 x 10 with cueing for alignment and pelvic floor and gluteal activation  Pelvic floor activation with cough  Squats with countertop support- 2 x 10 with cueing for alignment and pelvic floor muscle activation    - Patient educated on bladder health strategies: avoid going to void \"just in case.\"  Sit to void. Make sure bladder is empty before standing. Do not strain. Avoid drinks and foods that are bladder irritants/stimulants.   -education on weaning off of depend during more \"dry\" times  -encouraged walking program before returning to work    Skilled input: verbal instruction/cues, posture correction, facilitation, education/instruction on see above    Home Program:   Bridges  Squats  Pelvic floor muscle contractions    Writer verbally educated the patient and received verbal consent from the patient on hand placement, positioning of patient, and techniques to be performed today including clothing adjustments for techniques, therapist position for techniques, hand placement and palpation for techniques as  described above and how they are pertinent to the patient's plan of care.      Suggestions for next session as indicated: progress per plan of care    ASSESSMENT:   Patient is progressing well post operatively.  His bladder control is improving.  He has mild mixed incontinence.  Encouraged compliance with daily home exercise program.    Pain after treatment (patient reported, 0-10 scale): 0  Result of above outlined education: Verbalizes understanding and Needs reinforcement    Therapy procedure time and total treatment time can be found documented on the Time Entry flowsheet   diabetic diet

## 2023-08-04 NOTE — PROCEDURAL SAFETY CHECKLIST WITH OR WITHOUT SEDATION - NSPREPROCSEDMD_GEN_ALL_CORE
CHIEF COMPLAINT   Castillo Bustillo is a 24 year old patient presents to the office for routine adult health wellness.    HISTORY OF PRESENT ILLNESS  Castillo has following medical conditions to address today -     Routine adult health maintenance  (primary encounter diagnosis)  Screening for tuberculosis      LABS REVIEWED--  No visits with results within 6 Month(s) from this visit.   Latest known visit with results is:   Office Visit on 06/06/2022   Component Date Value Ref Range Status   • SKIN TEST RESULT 06/09/2022 Negative   Final   • Induration 06/09/2022 0  0 mm Final       HM due was advised.  Health Maintenance Due   Topic Date Due   • Pneumococcal Vaccine 0-64 (1 - PCV) Never done   • COVID-19 Vaccine (4 - Pfizer series) 03/10/2022   • Depression Screening  12/13/2023         Past Medical/surgical and Family history reviewed.  History reviewed. No pertinent past medical history.  Past Surgical History:   Procedure Laterality Date   • No past surgeries       Family History   Problem Relation Age of Onset   • Patient is unaware of any medical problems Mother    • Patient is unaware of any medical problems Father      Social History     Tobacco Use   • Smoking status: Some Days     Current packs/day: 0.00     Types: Cigars   • Smokeless tobacco: Never   Vaping Use   • Vaping Use: never used   Substance Use Topics   • Alcohol use: Yes   • Drug use: Never       Current medications  reviewed          ALLERGIES  ALLERGIES:  Patient has no known allergies.    ROS:  Negative except as HPI.          PHYSICAL EXAMINATION  Constitutional: Alert and oriented X 3. Appears not toxic and not dehydrated. Not distressed.    Visit Vitals  /70   Pulse 86   Temp 98 °F (36.7 °C)   Resp 18   Ht 5' 8\" (1.727 m)   Wt 83.9 kg (185 lb)   SpO2 98%   BMI 28.13 kg/m²       HENT: Head: Normocephalic and atraumatic.  Eyes: Conjunctiva normal, extraocular motions ana maria and no scleral icterus.     Neck: Normal range of motion. Neck  supple. No thyromegaly present.   Cardiovascular: Normal rate, regular rhythm. S1 S2 normal. No murmur heard. No JVD present. No pedal edema. Intact distal pulses.   Respiratory: Effort normal. Exhibits no respiratory distress. Auscultation reveals normal breath sounds.      Abdomen: Soft. Non-tender non-distended. No masses. No hepatosplenomegaly. No rebound and no guarding.  Bowel sounds normal.   Musculoskeletal: Gait is normal. No joint effusion or tenderness.   Extremities: No clubbing. No cyanosis.  Lymphatic: No neck adenopathy.   Neurological: Has normal motor skills. Cranial nerves are intact. Sensory: Normal.    Skin: Skin is warm and dry. Skin is not pale. Not diaphoretic.       ASSESSMENT/PLAN  Diagnoses and all orders for this visit:  Routine adult health maintenance  -     CBC with Automated Differential; Future  -     Chlamydia/Gonorrhea by Nucleic Acid Amplification; Future  -     Comprehensive Metabolic Panel; Future  -     Glycohemoglobin; Future  -     HIV 1/HIV 2 Antigen/Antibody Screen; Future  -     Lipid Panel With Reflex; Future  -     RPR (Rapid Plasma Reagin) Traditional Syphilis Screen Algorithm; Future  -     Thyroid Stimulating Hormone Reflex; Future  -     Trichomonas vaginalis by Nucleic Acid Amplification; Future  Screening for tuberculosis  -     Quantiferon TB Plus; Future          Low fat low cholesterol diet recommended.   Regular exercise recommended.        Prevention guidelines given.  Self Exams recommended.      Follow Up AS DIRECTED.  Patient understands and agrees with the plan.    Electronically signed by: Manny Ro MD  8/4/2023             done

## 2023-09-01 NOTE — H&P ADULT - PROBLEM SELECTOR PLAN 6
Occupational Therapy    Visit Type: treatment    Relevant History/Co-morbidities:   8.27: 83M with past medical history of COPD on 2-3L o2, depression, BPH, former tobacco smoker, AAA presented from home with difficulty breathing    SUBJECTIVE  Patient agreed to participate in therapy this date.  Present during session: son present at start and end of session.    \"I'm going home in a couple hours I think\"  Patient / Family Goal: maximize function    Pain   Patient does not demonstrate pain behaviors.    OBJECTIVE     Cognitive Status   Level of Consciousness   - alert  Functional Communication   - Overall Status: within functional limits   - Forms of Communication: verbal  Following Direction   - follows one step commands with repetition  Safety Awareness/Insight   - impaired  Awareness of Deficits   - decreased awareness of deficits    Vitals:  Patient on 2L O2 at beginning of session. Increased patient to 3L with activity due to decreased O2 at 86%. Patient then was able to maintain >90% throughout rest of session. Patient reported no short of breath or dizziness.        Sitting Balance  (CARLOS = base of support)  Dynamic      - Trial 1 details: supervision    Standing Balance  (CARLOS = base of support)  Firm Surface: Double Leg      - Static, Eyes Open       - Trial 1 details: contact guard and with double UE support       Bed Mobility  - Supine to sit: stand by assist (use of bed rail)  - Sit to supine: stand by assist  Transfers  Assistive devices: gait belt, 2-wheeled walker  - Sit to stand: contact guard/touching/steadying assist, with verbal cues  - Stand to sit: contact guard/touching/steadying assist, with verbal cues  Patient attempted first sit to stand from edge of bed and use of rolling walker. Patient demonstrated impulsive behaviors as he quickly stood and had loss of balance back onto the bed. Instructed patient to slow down body mechanics with use of hands to push off bed to control transfer. Patient  attempted second sit to stand transfer, again quickly standing with loss of balance backwards onto bed. Educated patient that therapist was going to hold gait belt to assist with safe and controlled transfer to achieve appropriate upright position, patient agreeable. Patient was then able to demonstrate safe sit to stand transfer with verbal cues and contact guard assist.       Functional Ambulation  - Assistance: contact guard/touching/steadying assist, with tactile cues and with verbal cues  - Assistive device: 2-wheeled walker  - Distance (ft):20  Patient ambulated <> bathroom with use of rolling walker in prep for using toilet. Patient required verbal and tactile cues for management of rolling walker as patient bumping into objects in his environment and unable to problem solve around barriers.   Activities of Daily Living (ADLs)  Grooming/Oral Hygiene:   - Grooming assist: contact guard/touching/steadying assist  - Position: standing at sink  Lower Body Dressing:   - Footwear:       - Assistance: stand by assist (SBA for seated balance)       - Position: edge of bed       - Type: socks  - Assist needed for: don/doff left sock and don/doff right sock  Toileting:   - Toilet transfer:        - Assist: contact guard/touching/steadying assist and with verbal cues       - Device: gait belt and 2-wheeled walker       - Equipment: grab bar use  - Assist: supervision  - Assist needed for: supervision/safety  Interventions    Training provided: activity tolerance, ADL training, balance retraining, bed mobility training, body mechanics, functional ambulation, transfer training, safety training, positioning and use of adaptive equipment  Skilled input: verbal instruction/cues and tactile instruction/cues  Verbal Consent: Writer verbally educated and received verbal consent for hand placement, positioning of patient, and techniques to be performed today from patient          Education:   - Present and ready to learn:  patient  Education provided during session:  - Results of above outlined education: Verbalizes understanding, Demonstrates understanding and Needs reinforcement    ASSESSMENT   Progress: progressing toward goals  Interferring components: cognitive deficits, decreased activity tolerance and decreased insight into deficit    Discharge needs based on today's assessment:  - Current level of function: slightly below baseline level of function  - Therapy needs at discharge: therapy 5 or more times per week  - Activities of daily living (ADLs) requiring support at discharge: bed mobility, transfers, ambulation, dressing, grooming, bathing and toileting  - Impairments that require further therapy intervention: balance, safety awareness, coordination, strength, activity tolerance and cognition  AM-PAC  - Prior Level of Function: Needs a little help ([facility] 12-21)       Key: MOD A=moderate assistance, IND/MOD I=independent/modified independent  - Generalized Current Level of Function     - Current Self-Cares: 18       Scoring Key= >21 Modified Independent; 20-21 Supervision; 18-19 Minimal assist; 13-18 Moderate assist; 9-12 Max assist; <9 Total assist    Therapy Diagnosis:   Decreased ability to perform self care tasks and functional transfers.          PLAN (while hospitalized)  Suggestions for next session as indicated:   At this time, recommending dc to ECF. During session, patient with significant decreased safety awareness, making transfers, mobility, and completion of ADLs a safety concern. Patient with weakness and decreased activity tolerance, negatively impacting his ability to participate in completion of ADLs. Patient would benefit from ECF to increase strength, activity tolerance, and learn modified techniques and energy conservation techniques to maximize level of independence and safety prior to dc home. OT will continue to follow.     OT Frequency: 3-5 x per week    A minimum of 8 minutes per session x 1 week in  the acute setting.    PT/OT Mobility Equipment for Discharge: Pt owns needed equipment  PT/OT ADL Equipment for Discharge: Owns needed equipment  Agreement to plan and goals: patient agrees with goals and treatment plan      GOALS  Review Date: 8/30/2023  Long Term Goals: (to be met by time of discharge from hospital)  Grooming: Patient will complete grooming tasks in standing supervision.  Status: progressing/ongoing  Lower body dressing: Patient will complete lower body dressing stand by assist. Status: progressing/ongoing  Toileting: Patient will complete toileting stand by assist.  Status: progressing/ongoing  Toilet transfer: Patient will complete toilet transfer with 2-wheeled walker, stand by assist.  Status: progressing/ongoing        Documented in the chart in the following areas: Assessment/Plan.    Patient at End of Session:   Location: in bed  Safety measures: alarm system in place/re-engaged and call light within reach  Handoff to: nurse      Therapy procedure time and total treatment time can be found documented on the Time Entry flowsheet   FS with RI coverage as per SS

## 2023-09-26 NOTE — ED PROVIDER NOTE - ABDOMINAL EXAM
Writer BRITNEY for pt after receiving message from [facility] regarding Dr. Aaron' labs. Explained in VM that it is a bit too early to have Dr. Aaron' labs done- these are best done 1 week prior to her OV on 11/8. Message sent to [facility] lab notifying of this. If pt has transportation issues, she may proceed with labs.   
midline surgical scar - c/d/i/soft

## 2023-10-10 NOTE — H&P ADULT - PROBLEM SELECTOR PLAN 3
The patient is a 54y Female complaining of flank pain.
Continue eliquis  Continue metoprolol for rate control

## 2023-10-20 NOTE — ED PROVIDER NOTE - PMH
Afib    BPH (benign prostatic hyperplasia)    Diabetes mellitus with no complication    Hypertension
Yes

## 2023-11-03 NOTE — PATIENT PROFILE ADULT - DO YOU FEEL UNSAFE AT WORK?
[facility] today and authorization for [employer] is still pending. I will call plan on  Monday to see if there is an update.   not applicable

## 2023-12-20 ENCOUNTER — INPATIENT (INPATIENT)
Facility: HOSPITAL | Age: 53
LOS: 5 days | Discharge: NURSING, REHAB OR LONG-TERM CARE | DRG: 871 | End: 2023-12-26
Attending: FAMILY MEDICINE | Admitting: FAMILY MEDICINE
Payer: COMMERCIAL

## 2023-12-20 VITALS
SYSTOLIC BLOOD PRESSURE: 141 MMHG | DIASTOLIC BLOOD PRESSURE: 87 MMHG | RESPIRATION RATE: 19 BRPM | OXYGEN SATURATION: 96 % | HEART RATE: 153 BPM | WEIGHT: 255.07 LBS | TEMPERATURE: 99 F

## 2023-12-20 DIAGNOSIS — A41.9 SEPSIS, UNSPECIFIED ORGANISM: ICD-10-CM

## 2023-12-20 DIAGNOSIS — E11.9 TYPE 2 DIABETES MELLITUS WITHOUT COMPLICATIONS: ICD-10-CM

## 2023-12-20 DIAGNOSIS — E05.90 THYROTOXICOSIS, UNSPECIFIED WITHOUT THYROTOXIC CRISIS OR STORM: ICD-10-CM

## 2023-12-20 DIAGNOSIS — S98.912A COMPLETE TRAUMATIC AMPUTATION OF LEFT FOOT, LEVEL UNSPECIFIED, INITIAL ENCOUNTER: Chronic | ICD-10-CM

## 2023-12-20 DIAGNOSIS — K25.5 CHRONIC OR UNSPECIFIED GASTRIC ULCER WITH PERFORATION: Chronic | ICD-10-CM

## 2023-12-20 DIAGNOSIS — I48.91 UNSPECIFIED ATRIAL FIBRILLATION: ICD-10-CM

## 2023-12-20 DIAGNOSIS — Z98.890 OTHER SPECIFIED POSTPROCEDURAL STATES: Chronic | ICD-10-CM

## 2023-12-20 LAB
A1C WITH ESTIMATED AVERAGE GLUCOSE RESULT: 7 % — HIGH (ref 4–5.6)
A1C WITH ESTIMATED AVERAGE GLUCOSE RESULT: 7 % — HIGH (ref 4–5.6)
ALBUMIN SERPL ELPH-MCNC: 3.3 G/DL — SIGNIFICANT CHANGE UP (ref 3.3–5)
ALBUMIN SERPL ELPH-MCNC: 3.3 G/DL — SIGNIFICANT CHANGE UP (ref 3.3–5)
ALP SERPL-CCNC: 138 U/L — HIGH (ref 40–120)
ALP SERPL-CCNC: 138 U/L — HIGH (ref 40–120)
ALT FLD-CCNC: 21 U/L — SIGNIFICANT CHANGE UP (ref 12–78)
ALT FLD-CCNC: 21 U/L — SIGNIFICANT CHANGE UP (ref 12–78)
ANION GAP SERPL CALC-SCNC: 6 MMOL/L — SIGNIFICANT CHANGE UP (ref 5–17)
ANION GAP SERPL CALC-SCNC: 6 MMOL/L — SIGNIFICANT CHANGE UP (ref 5–17)
ANISOCYTOSIS BLD QL: SLIGHT — SIGNIFICANT CHANGE UP
ANISOCYTOSIS BLD QL: SLIGHT — SIGNIFICANT CHANGE UP
APPEARANCE UR: ABNORMAL
APPEARANCE UR: ABNORMAL
APTT BLD: 28.2 SEC — SIGNIFICANT CHANGE UP (ref 24.5–35.6)
APTT BLD: 28.2 SEC — SIGNIFICANT CHANGE UP (ref 24.5–35.6)
AST SERPL-CCNC: 17 U/L — SIGNIFICANT CHANGE UP (ref 15–37)
AST SERPL-CCNC: 17 U/L — SIGNIFICANT CHANGE UP (ref 15–37)
BACTERIA # UR AUTO: ABNORMAL /HPF
BACTERIA # UR AUTO: ABNORMAL /HPF
BASOPHILS # BLD AUTO: 0 K/UL — SIGNIFICANT CHANGE UP (ref 0–0.2)
BASOPHILS # BLD AUTO: 0 K/UL — SIGNIFICANT CHANGE UP (ref 0–0.2)
BASOPHILS NFR BLD AUTO: 0 % — SIGNIFICANT CHANGE UP (ref 0–2)
BASOPHILS NFR BLD AUTO: 0 % — SIGNIFICANT CHANGE UP (ref 0–2)
BILIRUB SERPL-MCNC: 0.7 MG/DL — SIGNIFICANT CHANGE UP (ref 0.2–1.2)
BILIRUB SERPL-MCNC: 0.7 MG/DL — SIGNIFICANT CHANGE UP (ref 0.2–1.2)
BILIRUB UR-MCNC: NEGATIVE — SIGNIFICANT CHANGE UP
BILIRUB UR-MCNC: NEGATIVE — SIGNIFICANT CHANGE UP
BUN SERPL-MCNC: 16 MG/DL — SIGNIFICANT CHANGE UP (ref 7–23)
BUN SERPL-MCNC: 16 MG/DL — SIGNIFICANT CHANGE UP (ref 7–23)
CALCIUM SERPL-MCNC: 9.1 MG/DL — SIGNIFICANT CHANGE UP (ref 8.5–10.1)
CALCIUM SERPL-MCNC: 9.1 MG/DL — SIGNIFICANT CHANGE UP (ref 8.5–10.1)
CHLORIDE SERPL-SCNC: 106 MMOL/L — SIGNIFICANT CHANGE UP (ref 96–108)
CHLORIDE SERPL-SCNC: 106 MMOL/L — SIGNIFICANT CHANGE UP (ref 96–108)
CK SERPL-CCNC: 78 U/L — SIGNIFICANT CHANGE UP (ref 26–308)
CK SERPL-CCNC: 78 U/L — SIGNIFICANT CHANGE UP (ref 26–308)
CO2 SERPL-SCNC: 26 MMOL/L — SIGNIFICANT CHANGE UP (ref 22–31)
CO2 SERPL-SCNC: 26 MMOL/L — SIGNIFICANT CHANGE UP (ref 22–31)
COLOR SPEC: YELLOW — SIGNIFICANT CHANGE UP
COLOR SPEC: YELLOW — SIGNIFICANT CHANGE UP
CREAT SERPL-MCNC: 1.1 MG/DL — SIGNIFICANT CHANGE UP (ref 0.5–1.3)
CREAT SERPL-MCNC: 1.1 MG/DL — SIGNIFICANT CHANGE UP (ref 0.5–1.3)
DIFF PNL FLD: ABNORMAL
DIFF PNL FLD: ABNORMAL
EGFR: 79 ML/MIN/1.73M2 — SIGNIFICANT CHANGE UP
EGFR: 79 ML/MIN/1.73M2 — SIGNIFICANT CHANGE UP
EOSINOPHIL # BLD AUTO: 0.38 K/UL — SIGNIFICANT CHANGE UP (ref 0–0.5)
EOSINOPHIL # BLD AUTO: 0.38 K/UL — SIGNIFICANT CHANGE UP (ref 0–0.5)
EOSINOPHIL NFR BLD AUTO: 6 % — SIGNIFICANT CHANGE UP (ref 0–6)
EOSINOPHIL NFR BLD AUTO: 6 % — SIGNIFICANT CHANGE UP (ref 0–6)
EPI CELLS # UR: SIGNIFICANT CHANGE UP
EPI CELLS # UR: SIGNIFICANT CHANGE UP
ESTIMATED AVERAGE GLUCOSE: 154 MG/DL — HIGH (ref 68–114)
ESTIMATED AVERAGE GLUCOSE: 154 MG/DL — HIGH (ref 68–114)
GLUCOSE BLDC GLUCOMTR-MCNC: 129 MG/DL — HIGH (ref 70–99)
GLUCOSE BLDC GLUCOMTR-MCNC: 135 MG/DL — HIGH (ref 70–99)
GLUCOSE BLDC GLUCOMTR-MCNC: 135 MG/DL — HIGH (ref 70–99)
GLUCOSE BLDC GLUCOMTR-MCNC: 140 MG/DL — HIGH (ref 70–99)
GLUCOSE BLDC GLUCOMTR-MCNC: 140 MG/DL — HIGH (ref 70–99)
GLUCOSE SERPL-MCNC: 158 MG/DL — HIGH (ref 70–99)
GLUCOSE SERPL-MCNC: 158 MG/DL — HIGH (ref 70–99)
GLUCOSE UR QL: NEGATIVE MG/DL — SIGNIFICANT CHANGE UP
GLUCOSE UR QL: NEGATIVE MG/DL — SIGNIFICANT CHANGE UP
GRAM STN FLD: ABNORMAL
HCT VFR BLD CALC: 39.7 % — SIGNIFICANT CHANGE UP (ref 39–50)
HCT VFR BLD CALC: 39.7 % — SIGNIFICANT CHANGE UP (ref 39–50)
HGB BLD-MCNC: 13.3 G/DL — SIGNIFICANT CHANGE UP (ref 13–17)
HGB BLD-MCNC: 13.3 G/DL — SIGNIFICANT CHANGE UP (ref 13–17)
HYPOCHROMIA BLD QL: SLIGHT — SIGNIFICANT CHANGE UP
HYPOCHROMIA BLD QL: SLIGHT — SIGNIFICANT CHANGE UP
INR BLD: 1.12 RATIO — SIGNIFICANT CHANGE UP (ref 0.85–1.18)
INR BLD: 1.12 RATIO — SIGNIFICANT CHANGE UP (ref 0.85–1.18)
KETONES UR-MCNC: NEGATIVE MG/DL — SIGNIFICANT CHANGE UP
KETONES UR-MCNC: NEGATIVE MG/DL — SIGNIFICANT CHANGE UP
LACTATE SERPL-SCNC: 1.9 MMOL/L — SIGNIFICANT CHANGE UP (ref 0.7–2)
LACTATE SERPL-SCNC: 1.9 MMOL/L — SIGNIFICANT CHANGE UP (ref 0.7–2)
LACTATE SERPL-SCNC: 2.3 MMOL/L — HIGH (ref 0.7–2)
LACTATE SERPL-SCNC: 2.3 MMOL/L — HIGH (ref 0.7–2)
LEUKOCYTE ESTERASE UR-ACNC: ABNORMAL
LEUKOCYTE ESTERASE UR-ACNC: ABNORMAL
LYMPHOCYTES # BLD AUTO: 0.38 K/UL — LOW (ref 1–3.3)
LYMPHOCYTES # BLD AUTO: 0.38 K/UL — LOW (ref 1–3.3)
LYMPHOCYTES # BLD AUTO: 6 % — LOW (ref 13–44)
LYMPHOCYTES # BLD AUTO: 6 % — LOW (ref 13–44)
MANUAL SMEAR VERIFICATION: SIGNIFICANT CHANGE UP
MANUAL SMEAR VERIFICATION: SIGNIFICANT CHANGE UP
MCHC RBC-ENTMCNC: 31.2 PG — SIGNIFICANT CHANGE UP (ref 27–34)
MCHC RBC-ENTMCNC: 31.2 PG — SIGNIFICANT CHANGE UP (ref 27–34)
MCHC RBC-ENTMCNC: 33.5 GM/DL — SIGNIFICANT CHANGE UP (ref 32–36)
MCHC RBC-ENTMCNC: 33.5 GM/DL — SIGNIFICANT CHANGE UP (ref 32–36)
MCV RBC AUTO: 93.2 FL — SIGNIFICANT CHANGE UP (ref 80–100)
MCV RBC AUTO: 93.2 FL — SIGNIFICANT CHANGE UP (ref 80–100)
MONOCYTES # BLD AUTO: 0 K/UL — SIGNIFICANT CHANGE UP (ref 0–0.9)
MONOCYTES # BLD AUTO: 0 K/UL — SIGNIFICANT CHANGE UP (ref 0–0.9)
MONOCYTES NFR BLD AUTO: 0 % — LOW (ref 2–14)
MONOCYTES NFR BLD AUTO: 0 % — LOW (ref 2–14)
MYELOCYTES NFR BLD: 1 % — HIGH (ref 0–0)
MYELOCYTES NFR BLD: 1 % — HIGH (ref 0–0)
NEUTROPHILS # BLD AUTO: 5.44 K/UL — SIGNIFICANT CHANGE UP (ref 1.8–7.4)
NEUTROPHILS # BLD AUTO: 5.44 K/UL — SIGNIFICANT CHANGE UP (ref 1.8–7.4)
NEUTROPHILS NFR BLD AUTO: 75 % — SIGNIFICANT CHANGE UP (ref 43–77)
NEUTROPHILS NFR BLD AUTO: 75 % — SIGNIFICANT CHANGE UP (ref 43–77)
NEUTS BAND # BLD: 11 % — HIGH (ref 0–8)
NEUTS BAND # BLD: 11 % — HIGH (ref 0–8)
NITRITE UR-MCNC: POSITIVE
NITRITE UR-MCNC: POSITIVE
NRBC # BLD: 0 /100 — SIGNIFICANT CHANGE UP (ref 0–0)
NRBC # BLD: 0 /100 — SIGNIFICANT CHANGE UP (ref 0–0)
NT-PROBNP SERPL-SCNC: 3508 PG/ML — HIGH (ref 0–125)
NT-PROBNP SERPL-SCNC: 3508 PG/ML — HIGH (ref 0–125)
PH UR: 6.5 — SIGNIFICANT CHANGE UP (ref 5–8)
PH UR: 6.5 — SIGNIFICANT CHANGE UP (ref 5–8)
PLAT MORPH BLD: NORMAL — SIGNIFICANT CHANGE UP
PLAT MORPH BLD: NORMAL — SIGNIFICANT CHANGE UP
PLATELET # BLD AUTO: 187 K/UL — SIGNIFICANT CHANGE UP (ref 150–400)
PLATELET # BLD AUTO: 187 K/UL — SIGNIFICANT CHANGE UP (ref 150–400)
POIKILOCYTOSIS BLD QL AUTO: SLIGHT — SIGNIFICANT CHANGE UP
POIKILOCYTOSIS BLD QL AUTO: SLIGHT — SIGNIFICANT CHANGE UP
POLYCHROMASIA BLD QL SMEAR: SLIGHT — SIGNIFICANT CHANGE UP
POLYCHROMASIA BLD QL SMEAR: SLIGHT — SIGNIFICANT CHANGE UP
POTASSIUM SERPL-MCNC: 4 MMOL/L — SIGNIFICANT CHANGE UP (ref 3.5–5.3)
POTASSIUM SERPL-MCNC: 4 MMOL/L — SIGNIFICANT CHANGE UP (ref 3.5–5.3)
POTASSIUM SERPL-SCNC: 4 MMOL/L — SIGNIFICANT CHANGE UP (ref 3.5–5.3)
POTASSIUM SERPL-SCNC: 4 MMOL/L — SIGNIFICANT CHANGE UP (ref 3.5–5.3)
PROT SERPL-MCNC: 7.5 G/DL — SIGNIFICANT CHANGE UP (ref 6–8.3)
PROT SERPL-MCNC: 7.5 G/DL — SIGNIFICANT CHANGE UP (ref 6–8.3)
PROT UR-MCNC: 100 MG/DL
PROT UR-MCNC: 100 MG/DL
PROTHROM AB SERPL-ACNC: 13.1 SEC — HIGH (ref 9.5–13)
PROTHROM AB SERPL-ACNC: 13.1 SEC — HIGH (ref 9.5–13)
RAPID RVP RESULT: SIGNIFICANT CHANGE UP
RAPID RVP RESULT: SIGNIFICANT CHANGE UP
RBC # BLD: 4.26 M/UL — SIGNIFICANT CHANGE UP (ref 4.2–5.8)
RBC # BLD: 4.26 M/UL — SIGNIFICANT CHANGE UP (ref 4.2–5.8)
RBC # FLD: 13.2 % — SIGNIFICANT CHANGE UP (ref 10.3–14.5)
RBC # FLD: 13.2 % — SIGNIFICANT CHANGE UP (ref 10.3–14.5)
RBC BLD AUTO: SIGNIFICANT CHANGE UP
RBC BLD AUTO: SIGNIFICANT CHANGE UP
RBC CASTS # UR COMP ASSIST: 40 /HPF — HIGH (ref 0–4)
RBC CASTS # UR COMP ASSIST: 40 /HPF — HIGH (ref 0–4)
SARS-COV-2 RNA SPEC QL NAA+PROBE: SIGNIFICANT CHANGE UP
SARS-COV-2 RNA SPEC QL NAA+PROBE: SIGNIFICANT CHANGE UP
SODIUM SERPL-SCNC: 138 MMOL/L — SIGNIFICANT CHANGE UP (ref 135–145)
SODIUM SERPL-SCNC: 138 MMOL/L — SIGNIFICANT CHANGE UP (ref 135–145)
SP GR SPEC: 1.01 — SIGNIFICANT CHANGE UP (ref 1–1.03)
SP GR SPEC: 1.01 — SIGNIFICANT CHANGE UP (ref 1–1.03)
SPECIMEN SOURCE: SIGNIFICANT CHANGE UP
TROPONIN I, HIGH SENSITIVITY RESULT: 98.2 NG/L — HIGH
TROPONIN I, HIGH SENSITIVITY RESULT: 98.2 NG/L — HIGH
UROBILINOGEN FLD QL: 0.2 MG/DL — SIGNIFICANT CHANGE UP (ref 0.2–1)
UROBILINOGEN FLD QL: 0.2 MG/DL — SIGNIFICANT CHANGE UP (ref 0.2–1)
VARIANT LYMPHS # BLD: 1 % — SIGNIFICANT CHANGE UP (ref 0–6)
VARIANT LYMPHS # BLD: 1 % — SIGNIFICANT CHANGE UP (ref 0–6)
WBC # BLD: 6.32 K/UL — SIGNIFICANT CHANGE UP (ref 3.8–10.5)
WBC # BLD: 6.32 K/UL — SIGNIFICANT CHANGE UP (ref 3.8–10.5)
WBC # FLD AUTO: 6.32 K/UL — SIGNIFICANT CHANGE UP (ref 3.8–10.5)
WBC # FLD AUTO: 6.32 K/UL — SIGNIFICANT CHANGE UP (ref 3.8–10.5)
WBC UR QL: 30 /HPF — HIGH (ref 0–5)
WBC UR QL: 30 /HPF — HIGH (ref 0–5)

## 2023-12-20 PROCEDURE — 93010 ELECTROCARDIOGRAM REPORT: CPT

## 2023-12-20 PROCEDURE — 71045 X-RAY EXAM CHEST 1 VIEW: CPT | Mod: 26

## 2023-12-20 PROCEDURE — 74176 CT ABD & PELVIS W/O CONTRAST: CPT | Mod: 26,MA

## 2023-12-20 PROCEDURE — 99291 CRITICAL CARE FIRST HOUR: CPT

## 2023-12-20 PROCEDURE — 99223 1ST HOSP IP/OBS HIGH 75: CPT

## 2023-12-20 RX ORDER — GABAPENTIN 400 MG/1
100 CAPSULE ORAL AT BEDTIME
Refills: 0 | Status: DISCONTINUED | OUTPATIENT
Start: 2023-12-20 | End: 2023-12-22

## 2023-12-20 RX ORDER — PIPERACILLIN AND TAZOBACTAM 4; .5 G/20ML; G/20ML
3.38 INJECTION, POWDER, LYOPHILIZED, FOR SOLUTION INTRAVENOUS ONCE
Refills: 0 | Status: COMPLETED | OUTPATIENT
Start: 2023-12-20 | End: 2023-12-20

## 2023-12-20 RX ORDER — GLUCAGON INJECTION, SOLUTION 0.5 MG/.1ML
1 INJECTION, SOLUTION SUBCUTANEOUS ONCE
Refills: 0 | Status: DISCONTINUED | OUTPATIENT
Start: 2023-12-20 | End: 2023-12-26

## 2023-12-20 RX ORDER — ISOSORBIDE MONONITRATE 60 MG/1
30 TABLET, EXTENDED RELEASE ORAL DAILY
Refills: 0 | Status: DISCONTINUED | OUTPATIENT
Start: 2023-12-20 | End: 2023-12-21

## 2023-12-20 RX ORDER — PANTOPRAZOLE SODIUM 20 MG/1
40 TABLET, DELAYED RELEASE ORAL
Refills: 0 | Status: DISCONTINUED | OUTPATIENT
Start: 2023-12-20 | End: 2023-12-26

## 2023-12-20 RX ORDER — SUCRALFATE 1 G
1 TABLET ORAL
Refills: 0 | Status: DISCONTINUED | OUTPATIENT
Start: 2023-12-20 | End: 2023-12-26

## 2023-12-20 RX ORDER — ACETAMINOPHEN 500 MG
650 TABLET ORAL EVERY 6 HOURS
Refills: 0 | Status: DISCONTINUED | OUTPATIENT
Start: 2023-12-20 | End: 2023-12-26

## 2023-12-20 RX ORDER — DILTIAZEM HCL 120 MG
10 CAPSULE, EXT RELEASE 24 HR ORAL ONCE
Refills: 0 | Status: COMPLETED | OUTPATIENT
Start: 2023-12-20 | End: 2023-12-20

## 2023-12-20 RX ORDER — ATORVASTATIN CALCIUM 80 MG/1
40 TABLET, FILM COATED ORAL AT BEDTIME
Refills: 0 | Status: DISCONTINUED | OUTPATIENT
Start: 2023-12-20 | End: 2023-12-26

## 2023-12-20 RX ORDER — VANCOMYCIN HCL 1 G
1000 VIAL (EA) INTRAVENOUS ONCE
Refills: 0 | Status: COMPLETED | OUTPATIENT
Start: 2023-12-20 | End: 2023-12-20

## 2023-12-20 RX ORDER — DEXTROSE 50 % IN WATER 50 %
25 SYRINGE (ML) INTRAVENOUS ONCE
Refills: 0 | Status: DISCONTINUED | OUTPATIENT
Start: 2023-12-20 | End: 2023-12-26

## 2023-12-20 RX ORDER — DILTIAZEM HCL 120 MG
10 CAPSULE, EXT RELEASE 24 HR ORAL
Qty: 125 | Refills: 0 | Status: DISCONTINUED | OUTPATIENT
Start: 2023-12-20 | End: 2023-12-21

## 2023-12-20 RX ORDER — INSULIN LISPRO 100/ML
VIAL (ML) SUBCUTANEOUS AT BEDTIME
Refills: 0 | Status: DISCONTINUED | OUTPATIENT
Start: 2023-12-20 | End: 2023-12-20

## 2023-12-20 RX ORDER — CEFTRIAXONE 500 MG/1
2000 INJECTION, POWDER, FOR SOLUTION INTRAMUSCULAR; INTRAVENOUS EVERY 24 HOURS
Refills: 0 | Status: DISCONTINUED | OUTPATIENT
Start: 2023-12-20 | End: 2023-12-24

## 2023-12-20 RX ORDER — APIXABAN 2.5 MG/1
5 TABLET, FILM COATED ORAL EVERY 12 HOURS
Refills: 0 | Status: DISCONTINUED | OUTPATIENT
Start: 2023-12-20 | End: 2023-12-26

## 2023-12-20 RX ORDER — INSULIN LISPRO 100/ML
VIAL (ML) SUBCUTANEOUS
Refills: 0 | Status: DISCONTINUED | OUTPATIENT
Start: 2023-12-20 | End: 2023-12-26

## 2023-12-20 RX ORDER — LOSARTAN POTASSIUM 100 MG/1
25 TABLET, FILM COATED ORAL DAILY
Refills: 0 | Status: DISCONTINUED | OUTPATIENT
Start: 2023-12-20 | End: 2023-12-21

## 2023-12-20 RX ORDER — BETHANECHOL CHLORIDE 25 MG
25 TABLET ORAL THREE TIMES A DAY
Refills: 0 | Status: DISCONTINUED | OUTPATIENT
Start: 2023-12-20 | End: 2023-12-26

## 2023-12-20 RX ORDER — METOPROLOL TARTRATE 50 MG
50 TABLET ORAL DAILY
Refills: 0 | Status: DISCONTINUED | OUTPATIENT
Start: 2023-12-20 | End: 2023-12-21

## 2023-12-20 RX ORDER — SODIUM CHLORIDE 9 MG/ML
2500 INJECTION INTRAMUSCULAR; INTRAVENOUS; SUBCUTANEOUS ONCE
Refills: 0 | Status: COMPLETED | OUTPATIENT
Start: 2023-12-20 | End: 2023-12-20

## 2023-12-20 RX ORDER — MAGNESIUM HYDROXIDE 400 MG/1
30 TABLET, CHEWABLE ORAL AT BEDTIME
Refills: 0 | Status: DISCONTINUED | OUTPATIENT
Start: 2023-12-20 | End: 2023-12-26

## 2023-12-20 RX ORDER — SACCHAROMYCES BOULARDII 250 MG
250 POWDER IN PACKET (EA) ORAL
Refills: 0 | Status: DISCONTINUED | OUTPATIENT
Start: 2023-12-20 | End: 2023-12-26

## 2023-12-20 RX ORDER — TAMSULOSIN HYDROCHLORIDE 0.4 MG/1
0.8 CAPSULE ORAL AT BEDTIME
Refills: 0 | Status: DISCONTINUED | OUTPATIENT
Start: 2023-12-20 | End: 2023-12-26

## 2023-12-20 RX ORDER — INSULIN LISPRO 100/ML
VIAL (ML) SUBCUTANEOUS AT BEDTIME
Refills: 0 | Status: DISCONTINUED | OUTPATIENT
Start: 2023-12-20 | End: 2023-12-26

## 2023-12-20 RX ORDER — DEXTROSE 50 % IN WATER 50 %
15 SYRINGE (ML) INTRAVENOUS ONCE
Refills: 0 | Status: DISCONTINUED | OUTPATIENT
Start: 2023-12-20 | End: 2023-12-26

## 2023-12-20 RX ORDER — INSULIN GLARGINE 100 [IU]/ML
10 INJECTION, SOLUTION SUBCUTANEOUS AT BEDTIME
Refills: 0 | Status: DISCONTINUED | OUTPATIENT
Start: 2023-12-20 | End: 2023-12-24

## 2023-12-20 RX ORDER — PIPERACILLIN AND TAZOBACTAM 4; .5 G/20ML; G/20ML
3.38 INJECTION, POWDER, LYOPHILIZED, FOR SOLUTION INTRAVENOUS EVERY 8 HOURS
Refills: 0 | Status: DISCONTINUED | OUTPATIENT
Start: 2023-12-20 | End: 2023-12-20

## 2023-12-20 RX ORDER — DEXTROSE 50 % IN WATER 50 %
12.5 SYRINGE (ML) INTRAVENOUS ONCE
Refills: 0 | Status: DISCONTINUED | OUTPATIENT
Start: 2023-12-20 | End: 2023-12-26

## 2023-12-20 RX ORDER — OXYCODONE HYDROCHLORIDE 5 MG/1
10 TABLET ORAL ONCE
Refills: 0 | Status: DISCONTINUED | OUTPATIENT
Start: 2023-12-20 | End: 2023-12-20

## 2023-12-20 RX ORDER — SODIUM CHLORIDE 9 MG/ML
1000 INJECTION INTRAMUSCULAR; INTRAVENOUS; SUBCUTANEOUS
Refills: 0 | Status: DISCONTINUED | OUTPATIENT
Start: 2023-12-20 | End: 2023-12-23

## 2023-12-20 RX ORDER — INSULIN LISPRO 100/ML
VIAL (ML) SUBCUTANEOUS
Refills: 0 | Status: DISCONTINUED | OUTPATIENT
Start: 2023-12-20 | End: 2023-12-20

## 2023-12-20 RX ORDER — ASPIRIN/CALCIUM CARB/MAGNESIUM 324 MG
81 TABLET ORAL DAILY
Refills: 0 | Status: DISCONTINUED | OUTPATIENT
Start: 2023-12-20 | End: 2023-12-26

## 2023-12-20 RX ORDER — FAMOTIDINE 10 MG/ML
20 INJECTION INTRAVENOUS DAILY
Refills: 0 | Status: DISCONTINUED | OUTPATIENT
Start: 2023-12-20 | End: 2023-12-26

## 2023-12-20 RX ADMIN — INSULIN GLARGINE 10 UNIT(S): 100 INJECTION, SOLUTION SUBCUTANEOUS at 23:04

## 2023-12-20 RX ADMIN — Medication 10 MG/HR: at 10:55

## 2023-12-20 RX ADMIN — Medication 1000 MILLIGRAM(S): at 16:17

## 2023-12-20 RX ADMIN — PIPERACILLIN AND TAZOBACTAM 200 GRAM(S): 4; .5 INJECTION, POWDER, LYOPHILIZED, FOR SOLUTION INTRAVENOUS at 08:41

## 2023-12-20 RX ADMIN — LOSARTAN POTASSIUM 25 MILLIGRAM(S): 100 TABLET, FILM COATED ORAL at 16:53

## 2023-12-20 RX ADMIN — Medication 81 MILLIGRAM(S): at 11:48

## 2023-12-20 RX ADMIN — FAMOTIDINE 20 MILLIGRAM(S): 10 INJECTION INTRAVENOUS at 11:48

## 2023-12-20 RX ADMIN — Medication 250 MILLIGRAM(S): at 19:01

## 2023-12-20 RX ADMIN — Medication 1 GRAM(S): at 19:02

## 2023-12-20 RX ADMIN — PANTOPRAZOLE SODIUM 40 MILLIGRAM(S): 20 TABLET, DELAYED RELEASE ORAL at 16:53

## 2023-12-20 RX ADMIN — APIXABAN 5 MILLIGRAM(S): 2.5 TABLET, FILM COATED ORAL at 19:02

## 2023-12-20 RX ADMIN — Medication 1 GRAM(S): at 11:47

## 2023-12-20 RX ADMIN — SODIUM CHLORIDE 75 MILLILITER(S): 9 INJECTION INTRAMUSCULAR; INTRAVENOUS; SUBCUTANEOUS at 16:54

## 2023-12-20 RX ADMIN — SODIUM CHLORIDE 2500 MILLILITER(S): 9 INJECTION INTRAMUSCULAR; INTRAVENOUS; SUBCUTANEOUS at 08:41

## 2023-12-20 RX ADMIN — Medication 650 MILLIGRAM(S): at 12:17

## 2023-12-20 RX ADMIN — GABAPENTIN 100 MILLIGRAM(S): 400 CAPSULE ORAL at 22:43

## 2023-12-20 RX ADMIN — SODIUM CHLORIDE 2500 MILLILITER(S): 9 INJECTION INTRAMUSCULAR; INTRAVENOUS; SUBCUTANEOUS at 16:40

## 2023-12-20 RX ADMIN — CEFTRIAXONE 100 MILLIGRAM(S): 500 INJECTION, POWDER, FOR SOLUTION INTRAMUSCULAR; INTRAVENOUS at 16:52

## 2023-12-20 RX ADMIN — ISOSORBIDE MONONITRATE 30 MILLIGRAM(S): 60 TABLET, EXTENDED RELEASE ORAL at 11:48

## 2023-12-20 RX ADMIN — OXYCODONE HYDROCHLORIDE 10 MILLIGRAM(S): 5 TABLET ORAL at 23:13

## 2023-12-20 RX ADMIN — OXYCODONE HYDROCHLORIDE 10 MILLIGRAM(S): 5 TABLET ORAL at 22:43

## 2023-12-20 RX ADMIN — Medication 10 MILLIGRAM(S): at 08:49

## 2023-12-20 RX ADMIN — ATORVASTATIN CALCIUM 40 MILLIGRAM(S): 80 TABLET, FILM COATED ORAL at 22:44

## 2023-12-20 RX ADMIN — Medication 50 MILLIGRAM(S): at 16:53

## 2023-12-20 RX ADMIN — Medication 25 MILLIGRAM(S): at 22:47

## 2023-12-20 RX ADMIN — Medication 10 MG/HR: at 20:37

## 2023-12-20 RX ADMIN — Medication 250 MILLIGRAM(S): at 08:42

## 2023-12-20 RX ADMIN — Medication 650 MILLIGRAM(S): at 11:47

## 2023-12-20 RX ADMIN — Medication 25 MILLIGRAM(S): at 16:53

## 2023-12-20 NOTE — CONSULT NOTE ADULT - PROBLEM SELECTOR RECOMMENDATION 9
cont lantus 10 units qhs  change mod dose admelog corrective scale coverage qac/qhs  cont cons cho diet  goal bg 100-180 in hosp setting

## 2023-12-20 NOTE — ED ADULT NURSE REASSESSMENT NOTE - NSFALLUNIVINTERV_ED_ALL_ED
Bed/Stretcher in lowest position, wheels locked, appropriate side rails in place/Call bell, personal items and telephone in reach/Instruct patient to call for assistance before getting out of bed/chair/stretcher/Non-slip footwear applied when patient is off stretcher/[place] to call system/Physically safe environment - no spills, clutter or unnecessary equipment/Purposeful proactive rounding/Room/bathroom lighting operational, light cord in reach Bed/Stretcher in lowest position, wheels locked, appropriate side rails in place/Call bell, personal items and telephone in reach/Instruct patient to call for assistance before getting out of bed/chair/stretcher/Non-slip footwear applied when patient is off stretcher/[place] to call system/Physically safe environment - no spills, clutter or unnecessary equipment/Purposeful proactive rounding/Room/bathroom lighting operational, light cord in reach

## 2023-12-20 NOTE — ED ADULT NURSE REASSESSMENT NOTE - NS ED NURSE REASSESS COMMENT FT1
Pt resting comfortably in bed at this time, offers no complaints.  Denies any chest pain or discomfort.  No palpitations.  AFIB on cardiac monitor, rate 90s-110s.  Cardizem drip maintained at 10ml/hr.  No resp. distress or SOB noted.  Pt on nasal cannula 2lpm- tolerating well.  No n/v/d.  Awaiting Tele bed.  Maintain comfort and safety.

## 2023-12-20 NOTE — CONSULT NOTE ADULT - ASSESSMENT
Patient is a 54yo M with a PMH of CVA, chronic PE (on Eliquis), atrial fibrillation, hypertension, BPH, type 2 diabetes, chronic Aguilera, hyperlipidemia, GERD, CAD (s/p stent to RCA), peripheral vascular disease, hypothyroid who presents to the ED for SOB, chills and lethargy, found to be in rapid Afib w/ RVR.     #Arrhythmia  - EKbpm, Afib w/ RVR  - S/p IV Cardizem 10mg x1  - Started on Cardizem gtt  - Patient is already on Eliquis for chronic PE --> continue Eliquis for AC   - Monitor and replete lytes, keep K>4, Mg>2.    #Acute on chronic CHF exacerbation   - Pro bnp 3508  - TTE (2019): EF 65%, mild MR, concentric LVH, grade II diastolic dysfunction    - Continue Fluid restriction  - Strict I/Os, daily weights    #Ischemia   - Troponin 98.2 --> trend to peak  -   - Hx of CAD: Continue   - Continue to monitor for signs or symptoms of ischemia     #HTN  - BP stable currently  - Continue home meds:   - Continue to monitor hemodynamics     - Other cardiovascular workup will depend on clinical course.  - All other workup per primary team.  - Will continue to follow.       Patient is a 52yo M with a PMH of CVA, chronic PE (on Eliquis), atrial fibrillation, hypertension, BPH, type 2 diabetes, chronic Aguilera, hyperlipidemia, GERD, CAD (s/p stent to RCA), peripheral vascular disease, hypothyroid who presents to the ED for SOB, chills and lethargy, found to be in rapid Afib w/ RVR.     #Arrhythmia  - EKbpm, Afib w/ RVR  - S/p IV Cardizem 10mg x1  - Started on Cardizem gtt  - Patient is already on Eliquis for chronic PE --> continue Eliquis for AC   - Monitor and replete lytes, keep K>4, Mg>2.    #Acute on chronic CHF exacerbation   - Pro bnp 3508  - TTE (2019): EF 65%, mild MR, concentric LVH, grade II diastolic dysfunction    - Continue Fluid restriction  - Strict I/Os, daily weights    #Ischemia   - Troponin 98.2 --> trend to peak  -   - Hx of CAD: Continue   - Continue to monitor for signs or symptoms of ischemia     #HTN  - BP stable currently  - Continue home meds:   - Continue to monitor hemodynamics     - Other cardiovascular workup will depend on clinical course.  - All other workup per primary team.  - Will continue to follow.       Patient is a 54yo M with a PMH of CVA, chronic PE (on Eliquis), atrial fibrillation, hypertension, BPH, type 2 diabetes, chronic Aguilera, hyperlipidemia, GERD, CAD (s/p stent to RCA), peripheral vascular disease, hypothyroid who presents to the ED for SOB, chills and lethargy, found to be in rapid Afib w/ RVR.     #Arrhythmia  - EKbpm, Afib w/ RVR  - S/p IV Cardizem 10mg x1  - Started on Cardizem gtt  - Patient is already on Eliquis for chronic PE --> continue Eliquis for AC   - Continue home Metoprolol   - Monitor and replete lytes, keep K>4, Mg>2.    #Acute on chronic CHF exacerbation   - Pro bnp 3508  - TTE (2019): EF 65%, mild MR, concentric LVH, grade II diastolic dysfunction    - Continue Lasix   - Continue Fluid restriction  - Strict I/Os, daily weights    #Ischemia   - Troponin 98.2, likely elevated in the setting of demand ischemia --> trend to peak  - EKbpm, Afib w/ RVR  - Hx of CAD s/p 1 stent to RCA --> continue ASA, Imdur, Statin  - Continue to monitor for signs or symptoms of ischemia     #HTN  - BP stable currently  - Continue home meds: Metoprolol and Losartan   - Continue to monitor hemodynamics     - Other cardiovascular workup will depend on clinical course.  - All other workup per primary team.  - Will continue to follow.       Patient is a 52yo M with a PMH of CVA, chronic PE (on Eliquis), atrial fibrillation, hypertension, BPH, type 2 diabetes, chronic Aguilera, hyperlipidemia, GERD, CAD (s/p stent to RCA), peripheral vascular disease, hypothyroid who presents to the ED for SOB, chills and lethargy, found to be in rapid Afib w/ RVR.     #Arrhythmia  - EKbpm, Afib w/ RVR  - S/p IV Cardizem 10mg x1  - Started on Cardizem gtt  - Patient is already on Eliquis for chronic PE --> continue Eliquis for AC   - Continue home Metoprolol   - Monitor and replete lytes, keep K>4, Mg>2.    #Acute on chronic CHF exacerbation   - Pro bnp 3508  - TTE (2019): EF 65%, mild MR, concentric LVH, grade II diastolic dysfunction    - Continue Lasix   - Continue Fluid restriction  - Strict I/Os, daily weights    #Ischemia   - Troponin 98.2, likely elevated in the setting of demand ischemia --> trend to peak  - EKbpm, Afib w/ RVR  - Hx of CAD s/p 1 stent to RCA --> continue ASA, Imdur, Statin  - Continue to monitor for signs or symptoms of ischemia     #HTN  - BP stable currently  - Continue home meds: Metoprolol and Losartan   - Continue to monitor hemodynamics     - Other cardiovascular workup will depend on clinical course.  - All other workup per primary team.  - Will continue to follow.       Patient is a 54yo M with a PMH of CVA, chronic PE (on Eliquis), atrial fibrillation, hypertension, BPH, type 2 diabetes, chronic Aguilera, hyperlipidemia, GERD, CAD (s/p stent to RCA), peripheral vascular disease, hypothyroid who presents to the ED for SOB, chills and lethargy, found to be in rapid Afib w/ RVR. Patient states he used to follow with Cardio Dr. [place], but has not followed up with Cardio in many years.     #Arrhythmia  - Hx of PAF but no clear documentation on whether the patient has had recent episodes of Afib  - Previously on Amiodarone, but d/c due to patient having negative side effects affecting his thyroid   - EKbpm, Afib w/ RVR  - S/p IV Cardizem 10mg x1  - Started on Cardizem gtt  - Patient is already on Eliquis for chronic PE --> continue Eliquis for AC   - Start Metoprolol tartrate 50mg BID and titrate up if needed   - Repeat EKG when patients HR stabilizes   - Monitor on tele   - Monitor and replete lytes, keep K>4, Mg>2.    #Ischemia   - Troponin 98.2, likely elevated in the setting of demand ischemia --> trend to peak  - EKbpm, Afib w/ RVR  - Hx of CAD s/p 1 stent to RCA   - Hold ASA, as patient is medically frail and no need to increase bleeding risk   - Repeat EKG when patients HR stabilizes   - Continue to monitor for signs or symptoms of ischemia     #Acute on chronic CHF exacerbation   - Pro bnp 3508  - TTE (2019): EF 65%, mild MR, concentric LVH, grade II diastolic dysfunction    - Continue home Lasix 40mg PO daily   - Continue Fluid restriction  - Repeat TTE   - Strict I/Os, daily weights    #HTN  - BP stable currently  - Continue Metoprolol and Diltiazem   - Hold home Losartan to allow for AV yung agent titration   - Continue to monitor hemodynamics     - Other cardiovascular workup will depend on clinical course.  - All other workup per primary team.  - Will continue to follow.

## 2023-12-20 NOTE — ED PROVIDER NOTE - CLINICAL SUMMARY MEDICAL DECISION MAKING FREE TEXT BOX
Generalized weakness and tachycardia from SNF.  Will check labs, x-ray, IV fluids, IV antibiotics, EKG, admission

## 2023-12-20 NOTE — ED PROVIDER NOTE - DIFFERENTIAL DIAGNOSIS
Differential Diagnosis Rule out acute UTI, electrolyte abnormality, leukocytosis, rule out acute sepsis, infection, other acute pathology

## 2023-12-20 NOTE — ED PROVIDER NOTE - PROGRESS NOTE DETAILS
Discussed with Dr. Perlman, will see patient to admit for Dr. Brizuela. Patient continues to improve, no acute changes.  Will continue to monitor. Patient doing well, less tachycardic.  Will continue to monitor.  Will start Cardizem drip

## 2023-12-20 NOTE — H&P ADULT - HISTORY OF PRESENT ILLNESS
53-year-old male with a history of a CVA, chronic PE, atrial fibrillation, hypertension, BPH, type 2 diabetes, chronic Aguilera, hyperlipidemia, GERD, CAD, peripheral vascular disease, hypothyroid presents with brought in by EMS from [facility] for generalized weakness, urinary symptoms, feeling of obstruction, tachycardia, possible infection.  No nausea or vomiting.  No acute diarrhea.  No chest pain or shortness of breath.  Patient complaining of lower abdominal discomfort.  No neck or back pain.  No aggravating or alleviating factors otherwise noted.  No other acute injury or complaints.  Patient immunizations up to date 53-year-old male with a history of a CVA, chronic PE, atrial fibrillation, hypertension, BPH, type 2 diabetes, chronic Aguilera, hyperlipidemia, GERD, CAD, peripheral vascular disease, hypothyroid presents with brought in by EMS from [facility] for generalized weakness, urinary symptoms, feeling of obstruction, tachycardia, possible infection.  No nausea or vomiting.  No acute diarrhea.  No chest pain or shortness of breath.  Patient complaining of lower abdominal discomfort.  No neck or back pain.  No aggravating or alleviating factors otherwise noted.  No other acute injury or complaints.  Patient immunizations up to date

## 2023-12-20 NOTE — ED ADULT NURSE NOTE - NSFALLHARMRISKINTERV_ED_ALL_ED
Assistance OOB with selected safe patient handling equipment if applicable/Assistance with ambulation/Communicate risk of Fall with Harm to all staff, patient, and family/Monitor gait and stability/Provide visual cue: red socks, yellow wristband, yellow gown, etc/Reinforce activity limits and safety measures with patient and family/Bed in lowest position, wheels locked, appropriate side rails in place/Call bell, personal items and telephone in reach/Instruct patient to call for assistance before getting out of bed/chair/stretcher/Non-slip footwear applied when patient is off stretcher/[place] to call system/Physically safe environment - no spills, clutter or unnecessary equipment/Purposeful Proactive Rounding/Room/bathroom lighting operational, light cord in reach Assistance OOB with selected safe patient handling equipment if applicable/Assistance with ambulation/Communicate risk of Fall with Harm to all staff, patient, and family/Monitor gait and stability/Provide visual cue: red socks, yellow wristband, yellow gown, etc/Reinforce activity limits and safety measures with patient and family/Bed in lowest position, wheels locked, appropriate side rails in place/Call bell, personal items and telephone in reach/Instruct patient to call for assistance before getting out of bed/chair/stretcher/Non-slip footwear applied when patient is off stretcher/[place] to call system/Physically safe environment - no spills, clutter or unnecessary equipment/Purposeful Proactive Rounding/Room/bathroom lighting operational, light cord in reach

## 2023-12-20 NOTE — ED PROVIDER NOTE - ENMT, MLM
Airway patent, Nasal mucosa clear. Mouth with normal mucosa. Throat has no vesicles, no oropharyngeal exudates and uvula is midline. neck supple. non-toxic appearing.

## 2023-12-20 NOTE — H&P ADULT - NSICDXPASTSURGICALHX_GEN_ALL_CORE_FT
Physical Therapy  Per CM patient's family has now opted for hospice. PT will sign off. Please reconsult should patient status change.   Gloria Francis, PT, DPT PAST SURGICAL HISTORY:  H/O abdominal surgery     Perforated gastric ulcer     Traumatic amputation of left foot, initial encounter

## 2023-12-20 NOTE — CONSULT NOTE ADULT - SUBJECTIVE AND OBJECTIVE BOX
[facility] DIV[facility] of INFECTIOUS DISEASE  Juventino Whitten MD PhD, Symone Hickey MD, Anne-Marie Li MD, Jeffery Jacobs MD, Ryan Romeo MD  and providing coverage with Melody Armstrong MD  Providing Infectious Disease Consultations at [facility], [facility], [facility]    Office# [phone] to schedule follow up appointments  Answering Service for urgent calls or New Consults [phone]  Cell# to text for urgent issues Juventino Whitten [phone]     HPI:  53-year-old male with a history of a CVA, chronic PE, atrial fibrillation, hypertension, BPH, type 2 diabetes, chronic Aguilera, hyperlipidemia, GERD, CAD, peripheral vascular disease, hypothyroid presents with brought in by EMS from [facility] for generalized weakness, urinary symptoms, feeling of obstruction, tachycardia, possible infection.  No nausea or vomiting.  No acute diarrhea.  No chest pain or shortness of breath.  Patient complaining of lower abdominal discomfort.  No neck or back pain.  No aggravating or alleviating factors otherwise noted.  No other acute injury or complaints.  Patient immunizations up to date      PAST MEDICAL & SURGICAL HISTORY:  Diabetes      Diabetes mellitus with no complication      Afib      Hypertension      BPH (benign prostatic hyperplasia)      Perforated gastric ulcer  s/p emergent ex-lap omentopexy and plication 2019      Pulmonary embolism      History of non-ST elevation myocardial infarction (NSTEMI)      Osteomyelitis  s/p debridement      CAD S/P percutaneous coronary angioplasty      Cerebrovascular accident      H/O abdominal surgery      Perforated gastric ulcer      Traumatic amputation of left foot, initial encounter          Antimicrobials  piperacillin/tazobactam IVPB.. 3.375 Gram(s) IV Intermittent every 8 hours      Immunological      Other  acetaminophen     Tablet .. 650 milliGRAM(s) Oral every 6 hours PRN  apixaban 5 milliGRAM(s) Oral every 12 hours  aspirin  chewable 81 milliGRAM(s) Oral daily  atorvastatin 40 milliGRAM(s) Oral at bedtime  bethanechol 25 milliGRAM(s) Oral three times a day  dextrose 50% Injectable 25 Gram(s) IV Push once  dextrose 50% Injectable 12.5 Gram(s) IV Push once  dextrose 50% Injectable 25 Gram(s) IV Push once  dextrose Oral Gel 15 Gram(s) Oral once  diltiazem Infusion 10 mG/Hr IV Continuous <Continuous>  famotidine    Tablet 20 milliGRAM(s) Oral daily  gabapentin 100 milliGRAM(s) Oral at bedtime  glucagon  Injectable 1 milliGRAM(s) IntraMuscular once  insulin glargine Injectable (LANTUS) 10 Unit(s) SubCutaneous at bedtime  insulin lispro (ADMELOG) corrective regimen sliding scale   SubCutaneous three times a day before meals  insulin lispro (ADMELOG) corrective regimen sliding scale   SubCutaneous at bedtime  isosorbide   mononitrate ER Tablet (IMDUR) 30 milliGRAM(s) Oral daily  losartan 25 milliGRAM(s) Oral daily  magnesium hydroxide Suspension 30 milliLiter(s) Oral at bedtime PRN  methimazole 10 milliGRAM(s) Oral daily  metoprolol succinate ER 50 milliGRAM(s) Oral daily  pantoprazole    Tablet 40 milliGRAM(s) Oral before breakfast  saccharomyces boulardii 250 milliGRAM(s) Oral two times a day  sodium chloride 0.9%. 1000 milliLiter(s) IV Continuous <Continuous>  sucralfate 1 Gram(s) Oral four times a day  tamsulosin 0.8 milliGRAM(s) Oral at bedtime      Allergies    No Known Drug Allergies  fish (Hives)    Intolerances        SOCIAL HISTORY:  no toxic habits reported      FAMILY HISTORY:  FH: pulmonary embolism  Mother    FH: coronary artery disease  Father    FH: stroke  Father        ROS:    EYES:  Negative  blurry vision or double vision  GASTROINTESTINAL:  Negative for nausea, vomiting, diarrhea  -otherwise negative except for subjective    Vital Signs Last 24 Hrs  T(C): 38 (20 Dec 2023 11:11), Max: 38 (20 Dec 2023 11:11)  T(F): 100.4 (20 Dec 2023 11:11), Max: 100.4 (20 Dec 2023 11:11)  HR: 140 (20 Dec 2023 11:11) (120 - 166)  BP: 125/70 (20 Dec 2023 11:11) (119/64 - 141/87)  BP(mean): --  RR: 22 (20 Dec 2023 11:11) (19 - 24)  SpO2: 95% (20 Dec 2023 11:11) (95% - 100%)    Parameters below as of 20 Dec 2023 11:11  Patient On (Oxygen Delivery Method): nasal cannula  O2 Flow (L/min): 3      PE:  In no distress  HEENT:  NC, PERRL, sclerae anicteric, conjunctivae clear, EOMI.  Sinuses nontender, no nasal exudate.  No buccal or pharyngeal lesions, erythema or exudate  Neck:  Supple, no adenopathy  Lungs:  No accessory muscle use, bilaterally clear to auscultation  Cor:  distant  Abd:  Symmetric, normoactive BS.  Soft, nontender, no masses, guarding or rebound.  Liver and spleen not enlarged  Extrem:  No cyanosis or edema, left toe amputations  Skin:  No rashes.  Neuro: grossly intact  Musc: moving all limbs freely, no focal deficits  Rt CVA area tender      LABS:                        13.3   6.32  )-----------( 187      ( 20 Dec 2023 08:20 )             39.7     Band Neutrophils %: 11.0 % (23 @ 08:20)    WBC Count: 6.32 K/uL (23 @ 08:20)          138  |  106  |  16  ----------------------------<  158<H>  4.0   |  26  |  1.10    Ca    9.1      20 Dec 2023 08:20    TPro  7.5  /  Alb  3.3  /  TBili  0.7  /  DBili  x   /  AST  17  /  ALT  21  /  AlkPhos  138<H>        Creatinine: 1.10 mg/dL (23 @ 08:20)      Urinalysis Basic - ( 20 Dec 2023 09:00 )    Color: Yellow / Appearance: Cloudy / S.011 / pH: x  Gluc: x / Ketone: Negative mg/dL  / Bili: Negative / Urobili: 0.2 mg/dL   Blood: x / Protein: 100 mg/dL / Nitrite: Positive   Leuk Esterase: Large / RBC: 40 /HPF / WBC 30 /HPF   Sq Epi: x / Non Sq Epi: x / Bacteria: Moderate /HPF              MICROBIOLOGY:      RADIOLOGY & ADDITIONAL STUDIES:    --< from: CT Renal Stone Hunt (23 @ 09:48) >    ACC: [id number] EXAM:  CT RENAL STONE GIL   ORDERED BY: DEREK CHANCE     PROCEDURE DATE:  2023          INTERPRETATION:  CLINICAL INFORMATION: UTI. Retention. Sepsis. Evaluate   for stone.    COMPARISON: CT abdomen/pelvis 2022    CONTRAST/COMPLICATIONS:  IV Contrast: NONE  Oral Contrast: NONE  Complications: None reported at time of study completion    PROCEDURE:  CT of the Abdomen and Pelvis was performed.  Sagittal and coronal reformats were performed.    FINDINGS:  LOWER CHEST: Linear atelectasis or scarring at the lung bases. Coronary   artery calcifications.    LIVER: Within normal limits.  BILE DUCTS: Normal caliber.  GALLBLADDER: Normal caliber wall. No calcified gallstones. Small   calcifications along the periphery of the bladder, possibly granulomas.  SPLEEN: Within normal limits.  PANCREAS: Mildly atrophic with fatty infiltration.  ADRENALS: Within normal limits.  KIDNEYS/URETERS: Nonspecific bilateral perinephric fat stranding, greater   on the left. Mild fullness of the left renal pelvis. No hydronephrosis on   the right. No intrarenal calculi bilaterally. No hydroureter or ureteral   calculi bilaterally.    BLADDER: Underdistended with a Aguilera catheter. Air in the urinary bladder   compatible with the instrumentation. Mild infiltration of the   perivesicular fat.  REPRODUCTIVE ORGANS: Prostate not enlarged.    BOWEL: No bowel obstruction. Appendix is normal. Scattered colonic   diverticula without evidence of diverticulitis. Moderate amount retained   fecal material in the right hemicolon and transverse colon.  PERITONEUM: No ascites.  VESSELS: Atherosclerotic changes. Abdominal aorta normal in caliber.   Retroaortic left renal vein, an anatomic variant.  RETROPERITONEUM/LYMPH NODES: Mildly enlargedleft para-aortic lymph nodes   including a representative lymph node measuring 1.5 x 0.9 cm (series 2   image 49), previously 1.4 x 0.8 cm, not significantly changed  ABDOMINAL WALL: Fat-containing umbilical hernia.  BONES: Degenerative changes in the spine. No aggressive osseous lesion.   Old left-sided rib fractures.    IMPRESSION:    Mild infiltration of the perivesicular fat, likely cystitis.    Bilateral perinephric fat stranding, greater on the left which is a   nonspecific finding, howevermay reflect bilateral ascending urinary   tract infections.    Mild fullness of the left renal pelvis.    No hydronephrosis on the right.    No urinary tract calculus bilaterally.   [facility] DIV[facility] of INFECTIOUS DISEASE  Juventino Whitten MD PhD, Symone Hickey MD, Anne-Marie Li MD, Jeffery Jacobs MD, Ryan Romeo MD  and providing coverage with Melody Armstrong MD  Providing Infectious Disease Consultations at [facility], [facility], [facility]    Office# [phone] to schedule follow up appointments  Answering Service for urgent calls or New Consults [phone]  Cell# to text for urgent issues Juventino Whitten [phone]     HPI:  53-year-old male with a history of a CVA, chronic PE, atrial fibrillation, hypertension, BPH, type 2 diabetes, chronic Aguilera, hyperlipidemia, GERD, CAD, peripheral vascular disease, hypothyroid presents with brought in by EMS from [facility] for generalized weakness, urinary symptoms, feeling of obstruction, tachycardia, possible infection.  No nausea or vomiting.  No acute diarrhea.  No chest pain or shortness of breath.  Patient complaining of lower abdominal discomfort.  No neck or back pain.  No aggravating or alleviating factors otherwise noted.  No other acute injury or complaints.  Patient immunizations up to date      PAST MEDICAL & SURGICAL HISTORY:  Diabetes      Diabetes mellitus with no complication      Afib      Hypertension      BPH (benign prostatic hyperplasia)      Perforated gastric ulcer  s/p emergent ex-lap omentopexy and plication 2019      Pulmonary embolism      History of non-ST elevation myocardial infarction (NSTEMI)      Osteomyelitis  s/p debridement      CAD S/P percutaneous coronary angioplasty      Cerebrovascular accident      H/O abdominal surgery      Perforated gastric ulcer      Traumatic amputation of left foot, initial encounter          Antimicrobials  piperacillin/tazobactam IVPB.. 3.375 Gram(s) IV Intermittent every 8 hours      Immunological      Other  acetaminophen     Tablet .. 650 milliGRAM(s) Oral every 6 hours PRN  apixaban 5 milliGRAM(s) Oral every 12 hours  aspirin  chewable 81 milliGRAM(s) Oral daily  atorvastatin 40 milliGRAM(s) Oral at bedtime  bethanechol 25 milliGRAM(s) Oral three times a day  dextrose 50% Injectable 25 Gram(s) IV Push once  dextrose 50% Injectable 12.5 Gram(s) IV Push once  dextrose 50% Injectable 25 Gram(s) IV Push once  dextrose Oral Gel 15 Gram(s) Oral once  diltiazem Infusion 10 mG/Hr IV Continuous <Continuous>  famotidine    Tablet 20 milliGRAM(s) Oral daily  gabapentin 100 milliGRAM(s) Oral at bedtime  glucagon  Injectable 1 milliGRAM(s) IntraMuscular once  insulin glargine Injectable (LANTUS) 10 Unit(s) SubCutaneous at bedtime  insulin lispro (ADMELOG) corrective regimen sliding scale   SubCutaneous three times a day before meals  insulin lispro (ADMELOG) corrective regimen sliding scale   SubCutaneous at bedtime  isosorbide   mononitrate ER Tablet (IMDUR) 30 milliGRAM(s) Oral daily  losartan 25 milliGRAM(s) Oral daily  magnesium hydroxide Suspension 30 milliLiter(s) Oral at bedtime PRN  methimazole 10 milliGRAM(s) Oral daily  metoprolol succinate ER 50 milliGRAM(s) Oral daily  pantoprazole    Tablet 40 milliGRAM(s) Oral before breakfast  saccharomyces boulardii 250 milliGRAM(s) Oral two times a day  sodium chloride 0.9%. 1000 milliLiter(s) IV Continuous <Continuous>  sucralfate 1 Gram(s) Oral four times a day  tamsulosin 0.8 milliGRAM(s) Oral at bedtime      Allergies    No Known Drug Allergies  fish (Hives)    Intolerances        SOCIAL HISTORY:  no toxic habits reported      FAMILY HISTORY:  FH: pulmonary embolism  Mother    FH: coronary artery disease  Father    FH: stroke  Father        ROS:    EYES:  Negative  blurry vision or double vision  GASTROINTESTINAL:  Negative for nausea, vomiting, diarrhea  -otherwise negative except for subjective    Vital Signs Last 24 Hrs  T(C): 38 (20 Dec 2023 11:11), Max: 38 (20 Dec 2023 11:11)  T(F): 100.4 (20 Dec 2023 11:11), Max: 100.4 (20 Dec 2023 11:11)  HR: 140 (20 Dec 2023 11:11) (120 - 166)  BP: 125/70 (20 Dec 2023 11:11) (119/64 - 141/87)  BP(mean): --  RR: 22 (20 Dec 2023 11:11) (19 - 24)  SpO2: 95% (20 Dec 2023 11:11) (95% - 100%)    Parameters below as of 20 Dec 2023 11:11  Patient On (Oxygen Delivery Method): nasal cannula  O2 Flow (L/min): 3      PE:  In no distress  HEENT:  NC, PERRL, sclerae anicteric, conjunctivae clear, EOMI.  Sinuses nontender, no nasal exudate.  No buccal or pharyngeal lesions, erythema or exudate  Neck:  Supple, no adenopathy  Lungs:  No accessory muscle use, bilaterally clear to auscultation  Cor:  distant  Abd:  Symmetric, normoactive BS.  Soft, nontender, no masses, guarding or rebound.  Liver and spleen not enlarged  Extrem:  No cyanosis or edema, left toe amputations  Skin:  No rashes.  Neuro: grossly intact  Musc: moving all limbs freely, no focal deficits  Rt CVA area tender      LABS:                        13.3   6.32  )-----------( 187      ( 20 Dec 2023 08:20 )             39.7     Band Neutrophils %: 11.0 % (23 @ 08:20)    WBC Count: 6.32 K/uL (23 @ 08:20)          138  |  106  |  16  ----------------------------<  158<H>  4.0   |  26  |  1.10    Ca    9.1      20 Dec 2023 08:20    TPro  7.5  /  Alb  3.3  /  TBili  0.7  /  DBili  x   /  AST  17  /  ALT  21  /  AlkPhos  138<H>        Creatinine: 1.10 mg/dL (23 @ 08:20)      Urinalysis Basic - ( 20 Dec 2023 09:00 )    Color: Yellow / Appearance: Cloudy / S.011 / pH: x  Gluc: x / Ketone: Negative mg/dL  / Bili: Negative / Urobili: 0.2 mg/dL   Blood: x / Protein: 100 mg/dL / Nitrite: Positive   Leuk Esterase: Large / RBC: 40 /HPF / WBC 30 /HPF   Sq Epi: x / Non Sq Epi: x / Bacteria: Moderate /HPF              MICROBIOLOGY:      RADIOLOGY & ADDITIONAL STUDIES:    --< from: CT Renal Stone Hunt (23 @ 09:48) >    ACC: [id number] EXAM:  CT RENAL STONE GIL   ORDERED BY: DEREK CHANCE     PROCEDURE DATE:  2023          INTERPRETATION:  CLINICAL INFORMATION: UTI. Retention. Sepsis. Evaluate   for stone.    COMPARISON: CT abdomen/pelvis 2022    CONTRAST/COMPLICATIONS:  IV Contrast: NONE  Oral Contrast: NONE  Complications: None reported at time of study completion    PROCEDURE:  CT of the Abdomen and Pelvis was performed.  Sagittal and coronal reformats were performed.    FINDINGS:  LOWER CHEST: Linear atelectasis or scarring at the lung bases. Coronary   artery calcifications.    LIVER: Within normal limits.  BILE DUCTS: Normal caliber.  GALLBLADDER: Normal caliber wall. No calcified gallstones. Small   calcifications along the periphery of the bladder, possibly granulomas.  SPLEEN: Within normal limits.  PANCREAS: Mildly atrophic with fatty infiltration.  ADRENALS: Within normal limits.  KIDNEYS/URETERS: Nonspecific bilateral perinephric fat stranding, greater   on the left. Mild fullness of the left renal pelvis. No hydronephrosis on   the right. No intrarenal calculi bilaterally. No hydroureter or ureteral   calculi bilaterally.    BLADDER: Underdistended with a Aguilera catheter. Air in the urinary bladder   compatible with the instrumentation. Mild infiltration of the   perivesicular fat.  REPRODUCTIVE ORGANS: Prostate not enlarged.    BOWEL: No bowel obstruction. Appendix is normal. Scattered colonic   diverticula without evidence of diverticulitis. Moderate amount retained   fecal material in the right hemicolon and transverse colon.  PERITONEUM: No ascites.  VESSELS: Atherosclerotic changes. Abdominal aorta normal in caliber.   Retroaortic left renal vein, an anatomic variant.  RETROPERITONEUM/LYMPH NODES: Mildly enlargedleft para-aortic lymph nodes   including a representative lymph node measuring 1.5 x 0.9 cm (series 2   image 49), previously 1.4 x 0.8 cm, not significantly changed  ABDOMINAL WALL: Fat-containing umbilical hernia.  BONES: Degenerative changes in the spine. No aggressive osseous lesion.   Old left-sided rib fractures.    IMPRESSION:    Mild infiltration of the perivesicular fat, likely cystitis.    Bilateral perinephric fat stranding, greater on the left which is a   nonspecific finding, howevermay reflect bilateral ascending urinary   tract infections.    Mild fullness of the left renal pelvis.    No hydronephrosis on the right.    No urinary tract calculus bilaterally.

## 2023-12-20 NOTE — ED ADULT NURSE NOTE - CHIEF COMPLAINT QUOTE
From [facility].  SOB.  Increased HR. From [facility].  SOB.  Increased HR.

## 2023-12-20 NOTE — ED ADULT NURSE NOTE - OBJECTIVE STATEMENT
Pt received in bed alert and oriented and resting in bed with the SOB, weakness, clogged jacques cath. As per MD's orders IV azalia placed blood specimen obtained and sent to the lab. Meds given and tolerated well. Jacques cath 18 Citizen of [place] changed and tolerated well. Pt noted to be in rapid Afib 140lbs.  Pt has left foot toes amputations that has been healed properly. Pt received in bed alert and oriented and resting in bed with the SOB, weakness, clogged jacques cath. As per MD's orders IV azalia placed blood specimen obtained and sent to the lab. Meds given and tolerated well. Jacques cath 18 [place] changed and tolerated well. Pt noted to be in rapid Afib 140lbs.  Pt has left foot toes amputations that has been healed properly.

## 2023-12-20 NOTE — ED PROVIDER NOTE - OBJECTIVE STATEMENT
53-year-old male with a history of a CVA, chronic PE, atrial fibrillation, hypertension, BPH, type 2 diabetes, chronic Aguilera, hyperlipidemia, GERD, CAD, peripheral vascular disease, hypothyroid presents with brought in by EMS from  for generalized weakness, urinary symptoms, feeling of obstruction, tachycardia, possible infection.  No nausea or vomiting.  No acute diarrhea.  No chest pain or shortness of breath.  Patient complaining of lower abdominal discomfort.  No neck or back pain.  No aggravating or alleviating factors otherwise noted.  No other acute injury or complaints.  Patient immunizations up-to-date. 53-year-old male with a history of a CVA, chronic PE, atrial fibrillation, hypertension, BPH, type 2 diabetes, chronic Aguilera, hyperlipidemia, GERD, CAD, peripheral vascular disease, hypothyroid presents with brought in by EMS from [facility] for generalized weakness, urinary symptoms, feeling of obstruction, tachycardia, possible infection.  No nausea or vomiting.  No acute diarrhea.  No chest pain or shortness of breath.  Patient complaining of lower abdominal discomfort.  No neck or back pain.  No aggravating or alleviating factors otherwise noted.  No other acute injury or complaints.  Patient immunizations up-to-date.

## 2023-12-20 NOTE — ED PROVIDER NOTE - SEPSIS CONTRAINDICATION FLUID ADMINISTRATION
High Dose Vitamin A Counseling: Side effects reviewed, pt to contact office should one occur. Not applicable

## 2023-12-20 NOTE — PROVIDER CONTACT NOTE (CRITICAL VALUE NOTIFICATION) - ACTION/TREATMENT ORDERED:
"March 18, 2021       Joann Knight, [address]  [place] [address] [facility]  Via In Basket      Patient: Arnie Logan   YOB: 1964   Date of Visit: 3/17/2021       Dear Dr. Theodore Jules:    I saw your patient, Arnie Logan, for an evaluation. Below are my notes for this visit with her. If you have questions, please do not hesitate to call me. Sincerely,        Megan Monteiro MD        CC: No Recipients  Megan Monteiro MD  3/18/2021 10:20 PM  Sign when Signing Visit  PM&R EMG CONSULT NOTE    Date of Study: 3/17/2021     Referring Provider: Anaya Andino MD    Patient name: Arnie Logan  YOB: 1964    Chief Complaint: right hand pain       HPI: 60year old right handed female presenting for electrodiagnostic evaluation of pain, numbness, and tingling of the right hand.      Onset: 5-6 months ago    Inciting event: none    Location: reports numbness primarily involving the right thumb and long fingers, also reports pain over the palmar surface of the right hand and the hand feels ""stiff\"" when she wakes up in the morning    Duration: intermittent    Aggravating: using the hand, worse at night    Alleviating: massaging the hand    Radiation: reports pain over the right axilla that extends down the arm to the hand    Weakness: right hand     Nocturnal symptoms: Yes   Neck pain: Yes      PMH/ROS:  Bladder Incontinence/Retention: No   Bowel Incontinence: No   Diabetes: No   Renal Failure/Dialysis: No   Masses, cysts, nodules at wrist or elbow: No   Swelling of the limbs: No   Skin changes: No   Thyroid Disease: No   HIV Positive: No   History of Lyme Disease: No   History of Cancer: No     Radiation Therapy: No     Chemotherapy: No   History of carpal tunnel syndrome (left/right/bilateral): No   History of ulnar neuropathy (left/right/bilateral): No   History of lumbar spine surgery: No   History of cervical spine surgery: No   History of gastric bypass surgery: No " "  Pacemaker or AICD: No   Anticoagulation therapy: none     SOCIAL HISTORY:   Cigarette Use: No  Alcohol Use: Yes, 2 drinks per week  Occupational History: Not currently working. FAMILY HISTORY: No neurological diseases. PHYSICAL EXAM:  Vitals:   Vitals:    03/17/21 1343   Pulse: 75   SpO2: 96%   Weight: 79.4 kg   Height: 5' 1"" (1.549 m)     General: NAD, awake and alert. Resp: Breathing comfortably on room air. CV: Radial pulses are strong bilaterally. Inspection: No focal muscle atrophy or fasciculations noted. Range of motion: Full in both upper extremities. Extremities: No peripheral edema, no masses, lumps, or nodules. Skin: No rashes or lesions noted in the right upper extremity. Neurologic:  Motor:    Right: Sab 5/5, EF 5/5, EE 5/5, WE 5/5, APB 5/5, FDIM 5/5, FPL 5/5, FDP 5/5, ADM 5/5    Left:  Sab 5/5, EF 5/5, EE 5/5, WE 5/5, APB 5/5, FDIM 5/5, FPL 5/5, FDP 5/5, ADM 5/5  Sensation: PP: decreased over the median nerve distribution of the right hand   Reflexes:   Right: Biceps 2+, Triceps 2+, Allen's negative   Left: Biceps 2+, Triceps 2+, Allen's negative   Special Tests: Tinel's at the wrist: positive on the right, Stress Phalen's: negative, right shoulder impingement testing is negative       ELECTRODIAGNOSTIC STUDIES:  The procedure was explained to the patient. The patient acknowledged and agreed, and verbal consent was obtained. NERVE CONDUCTION STUDY:  Sensory studies are antidromic unless otherwise specified. Latencies are measured to the negative peak. Sensory filter settings are 10 to 5,000 Hz. Motor filter settings are 3 to 10,000 Hz. The elbow was maintained in moderate flexion (70-90 degrees) for all ulnar motor studies performed. NEEDLE ELECTROMYOGRAPHY: Electromyographic exam was performed with a standard 25 and 38 mm MF Sagar coated monopolar electrode and filter settings of 10 to 10,000 Hz.     Limb Temp:  35 degrees Celsius     Nerve Conduction Studies  Motor " Summary Table   Site NR Onset (ms) Norm Onset (ms) O-P Amp (mV) Norm O-P Amp Site1 Site2 Delta-0 (ms) Dist (cm) Aftab (m/s) Norm Aftab (m/s)   Right Median Motor (Abd Poll Brev)   Wrist    4.9 <4.5 5.5 >4.1 Elbow Wrist 3.7 19.0 51 >50   Elbow    8.6  5.3          Right Ulnar Motor (Abd Dig Minimi)   Wrist    3.1 <3.7 10.9 >5.5 B Elbow Wrist 2.6 17.0 65 >50   B Elbow    5.7  10.6  A Elbow B Elbow 2.0 11.5 58 >50   A Elbow    7.7  10.5            Anti Sensory Summary Table   Site NR Peak (ms) Norm Peak (ms) P-T Amp (ÂµV) Norm O-P Amp Site1 Site2 Delta-P (ms) Dist (cm)   Right Median Anti Sensory (2nd Digit)   Wrist NR  <4  >12 Wrist 2nd Digit  14.0   Right Radial Snuffbox Anti Sensory (Snuffbox)   Wrist    2.2 <2.8 33.5 >10 Wrist Snuffbox 2.2 10.0   Right Ulnar Anti Sensory (5th Digit)   Wrist    3.3 <4 9.8 >9 Wrist 5th Digit 3.3 14.0     Comparison Summary Table   Site NR Peak (ms) Norm Peak (ms) P-T Amp (ÂµV) Site1 Site2 Delta-P (ms) Norm Delta (ms)   Right Median/Ulnar Palm Comparison (Wrist - 8cm)   Median Palm    3.5 <2.5 3.9 Median Palm Ulnar Palm 1.7 <0.4   Ulnar Palm    1.8 <2.5 9.5         EMG   Side Muscle Nerve Root Ins Act Fibs Psw Fasc Amp Dur Poly Recrt Comment   Right Biceps Musculocut C5-6 Nml 0 0 0 Nml Nml 0 Nml    Right Triceps Radial C6-7-8 Nml 0 0 0 Nml Nml 0 Nml    Right FlexCarRad Median C6-7 Nml 0 0 0 Nml Nml 0 Nml    Right 1stDorInt Ulnar C8-T1 Nml 0 0 0 Nml Nml 0 Nml    Right Opponens Pollicis Median A7-E1 Nml 0 0 0 Nml Nml 0 Nml    Right Deltoid Axillary C5-6 Nml 0 0 0 Nml Nml 0 Nml        Waveforms:                  COMMENTS:  The motor conductions of the right median nerve revealed a prolonged distal onset latency with normal amplitudes and a normal conduction velocity. The motor conductions of the right ulnar nerve were normal with respect to distal onset latency, amplitude, and conduction velocity. The sensory conduction of the right median nerve was unobtainable.     The sensory conductions of the right ulnar and superficial radial nerves were normal with respect to peak latency and amplitude. Transcarpal comparison study of the right median to ulnar nerve revealed a significant peak latency difference of 1.7 ms. Needle EMG of selected right upper extremity muscles revealed no abnormalities. Since the limb muscles were normal, the cervical paraspinals were not tested. ELECTRODIAGNOSTIC IMPRESSION:  Abnormal Study    1) There is electrodiagnostic evidence of a moderate to severe right median mononeuropathy at the wrist (ie carpal tunnel syndrome). There is no evidence of axonal loss on needle EMG of the median innervated opponens pollicis. 2) There is no electrodiagnostic evidence of a right ulnar mononeuropathy. 3) There is no electrodiagnostic evidence of a right C5-T1 radiculopathy or brachial plexopathy per needle EMG. RECOMMENDATIONS:    Recommend referral to Hand Surgery clinic for evaluation for possible right carpal tunnel release. Thank you for the referral.  Please page with any questions.      Antonieta Seip, MD  Diplomate, American Board of Electrodiagnostic Medicine  Diplomate, American Board of Physical Medicine & Rehabilitation Treatment in progress

## 2023-12-20 NOTE — CONSULT NOTE ADULT - NS ATTEND AMEND GEN_ALL_CORE FT
Patient is a 52yo M with a PMH of CVA, chronic PE (on Eliquis), atrial fibrillation, hypertension, BPH, type 2 diabetes, chronic Aguilera, hyperlipidemia, GERD, CAD (s/p stent to RCA), peripheral vascular disease, hypothyroid who presents to the ED for SOB, chills and lethargy, found to be in rapid Afib w/ RVR. Patient states he used to follow with Cardio Dr. [place], but has not followed up with Cardio in many years.     nh resident  hx of paf, but does not have definite sxs, and unclear burden of af  previously on amio, stopped for thyroid issues  now with rapid af in the setting of apparent recurrent urinary infection   dilt gtt  change metop to tartrate and increase as needed  ac  mild elev trop, trend to peak  mild vol ol, cont po lasix and can increase if needed Patient is a 54yo M with a PMH of CVA, chronic PE (on Eliquis), atrial fibrillation, hypertension, BPH, type 2 diabetes, chronic Aguilera, hyperlipidemia, GERD, CAD (s/p stent to RCA), peripheral vascular disease, hypothyroid who presents to the ED for SOB, chills and lethargy, found to be in rapid Afib w/ RVR. Patient states he used to follow with Cardio Dr. [place], but has not followed up with Cardio in many years.     nh resident  hx of paf, but does not have definite sxs, and unclear burden of af  previously on amio, stopped for thyroid issues  now with rapid af in the setting of apparent recurrent urinary infection   dilt gtt  change metop to tartrate and increase as needed  ac  mild elev trop, trend to peak  mild vol ol, cont po lasix and can increase if needed

## 2023-12-20 NOTE — ED PROVIDER NOTE - GASTROINTESTINAL, MLM
Abdomen soft, non-distended. no guarding. Pos urine in jacques bag / tube, pos tend lower abd / pelvis

## 2023-12-20 NOTE — ED PROVIDER NOTE - CARE PLAN
Principal Discharge DX:	Sepsis due to urinary tract infection  Secondary Diagnosis:	Rapid atrial fibrillation   1 Principal Discharge DX:	Sepsis due to urinary tract infection  Secondary Diagnosis:	Rapid atrial fibrillation  Secondary Diagnosis:	Pyelonephritis

## 2023-12-20 NOTE — CONSULT NOTE ADULT - SUBJECTIVE AND OBJECTIVE BOX
Patient is a 53y old  Male who presents with a chief complaint of     Reason For Consult: dm2 uncontrolled    HPI:  53-year-old male with a history of a CVA, chronic PE, atrial fibrillation, hypertension, BPH, type 2 diabetes, chronic Aguilera, hyperlipidemia, GERD, CAD, peripheral vascular disease, hypothyroid presents with brought in by EMS from [facility] for generalized weakness, urinary symptoms, feeling of obstruction, tachycardia, possible infection.  No nausea or vomiting.  No acute diarrhea.  No chest pain or shortness of breath.  Patient complaining of lower abdominal discomfort.  No neck or back pain.  No aggravating or alleviating factors otherwise noted.  No other acute injury or complaints.  Patient immunizations up to date (20 Dec 2023 10:52)      PAST MEDICAL & SURGICAL HISTORY:  Diabetes      Diabetes mellitus with no complication      Afib      Hypertension      BPH (benign prostatic hyperplasia)      Perforated gastric ulcer  s/p emergent ex-lap omentopexy and plication 6/2019      Pulmonary embolism      History of non-ST elevation myocardial infarction (NSTEMI)      Osteomyelitis  s/p debridement      CAD S/P percutaneous coronary angioplasty      Cerebrovascular accident      H/O abdominal surgery      Perforated gastric ulcer      Traumatic amputation of left foot, initial encounter          FAMILY HISTORY:  FH: pulmonary embolism  Mother    FH: coronary artery disease  Father    FH: stroke  Father          Social History:    MEDICATIONS  (STANDING):  apixaban 5 milliGRAM(s) Oral every 12 hours  aspirin  chewable 81 milliGRAM(s) Oral daily  atorvastatin 40 milliGRAM(s) Oral at bedtime  bethanechol 25 milliGRAM(s) Oral three times a day  dextrose 50% Injectable 25 Gram(s) IV Push once  dextrose 50% Injectable 12.5 Gram(s) IV Push once  dextrose 50% Injectable 25 Gram(s) IV Push once  dextrose Oral Gel 15 Gram(s) Oral once  diltiazem Infusion 10 mG/Hr (10 mL/Hr) IV Continuous <Continuous>  famotidine    Tablet 20 milliGRAM(s) Oral daily  gabapentin 100 milliGRAM(s) Oral at bedtime  glucagon  Injectable 1 milliGRAM(s) IntraMuscular once  insulin glargine Injectable (LANTUS) 10 Unit(s) SubCutaneous at bedtime  insulin lispro (ADMELOG) corrective regimen sliding scale   SubCutaneous at bedtime  insulin lispro (ADMELOG) corrective regimen sliding scale   SubCutaneous three times a day before meals  isosorbide   mononitrate ER Tablet (IMDUR) 30 milliGRAM(s) Oral daily  losartan 25 milliGRAM(s) Oral daily  methimazole 10 milliGRAM(s) Oral daily  metoprolol succinate ER 50 milliGRAM(s) Oral daily  pantoprazole    Tablet 40 milliGRAM(s) Oral before breakfast  piperacillin/tazobactam IVPB.. 3.375 Gram(s) IV Intermittent every 8 hours  saccharomyces boulardii 250 milliGRAM(s) Oral two times a day  sodium chloride 0.9%. 1000 milliLiter(s) (75 mL/Hr) IV Continuous <Continuous>  sucralfate 1 Gram(s) Oral four times a day  tamsulosin 0.8 milliGRAM(s) Oral at bedtime    MEDICATIONS  (PRN):  acetaminophen     Tablet .. 650 milliGRAM(s) Oral every 6 hours PRN Temp greater or equal to 38C (100.4F), Mild Pain (1 - 3)  magnesium hydroxide Suspension 30 milliLiter(s) Oral at bedtime PRN Constipation        T(C): 38 (12-20-23 @ 11:11), Max: 38 (12-20-23 @ 11:11)  HR: 140 (12-20-23 @ 11:11) (120 - 166)  BP: 125/70 (12-20-23 @ 11:11) (119/64 - 141/87)  RR: 22 (12-20-23 @ 11:11) (19 - 24)  SpO2: 95% (12-20-23 @ 11:11) (95% - 100%)  Wt(kg): --    PHYSICAL EXAM:  GENERAL: NAD, well-groomed, well-developed  HEAD:  Atraumatic, Normocephalic  NECK: Supple, No JVD, Normal thyroid  CHEST/LUNG: Clear to percussion bilaterally; No rales, rhonchi, wheezing, or rubs  HEART: Regular rate and rhythm; No murmurs, rubs, or gallops  ABDOMEN: Soft, Nontender, Nondistended; Bowel sounds present  EXTREMITIES:  2+ Peripheral Pulses, No clubbing, cyanosis, or edema  SKIN: No rashes or lesions    CAPILLARY BLOOD GLUCOSE                                13.3   6.32  )-----------( 187      ( 20 Dec 2023 08:20 )             39.7       CMP:  12-20 @ 08:20  SGPT 21  Albumin 3.3   Alk Phos 138   Anion Gap 6   SGOT 17   Total Bili 0.7   BUN 16   Calcium Total 9.1   CO2 26   Chloride 106   Creatinine 1.10   eGFR if AA --   eGFR if non AA --   Glucose 158   Potassium 4.0   Protein 7.5   Sodium 138      Thyroid Function Tests:      Diabetes Tests:       Radiology:                  Patient is a 53y old  Male who presents with a chief complaint of     Reason For Consult: dm2 uncontrolled    HPI:  53-year-old male with a history of a CVA, chronic PE, atrial fibrillation, hypertension, BPH, type 2 diabetes, chronic Aguilera, hyperlipidemia, GERD, CAD, peripheral vascular disease, hypothyroid presents with brought in by EMS from [facility] for generalized weakness, urinary symptoms, feeling of obstruction, tachycardia, possible infection.  No nausea or vomiting.  No acute diarrhea.  No chest pain or shortness of breath.  Patient complaining of lower abdominal discomfort.  No neck or back pain.  No aggravating or alleviating factors otherwise noted.  No other acute injury or complaints.  Patient immunizations up to date (20 Dec 2023 10:52)      PAST MEDICAL & SURGICAL HISTORY:  Diabetes      Diabetes mellitus with no complication      Afib      Hypertension      BPH (benign prostatic hyperplasia)      Perforated gastric ulcer  s/p emergent ex-lap omentopexy and plication 6/2019      Pulmonary embolism      History of non-ST elevation myocardial infarction (NSTEMI)      Osteomyelitis  s/p debridement      CAD S/P percutaneous coronary angioplasty      Cerebrovascular accident      H/O abdominal surgery      Perforated gastric ulcer      Traumatic amputation of left foot, initial encounter          FAMILY HISTORY:  FH: pulmonary embolism  Mother    FH: coronary artery disease  Father    FH: stroke  Father          Social History:    MEDICATIONS  (STANDING):  apixaban 5 milliGRAM(s) Oral every 12 hours  aspirin  chewable 81 milliGRAM(s) Oral daily  atorvastatin 40 milliGRAM(s) Oral at bedtime  bethanechol 25 milliGRAM(s) Oral three times a day  dextrose 50% Injectable 25 Gram(s) IV Push once  dextrose 50% Injectable 12.5 Gram(s) IV Push once  dextrose 50% Injectable 25 Gram(s) IV Push once  dextrose Oral Gel 15 Gram(s) Oral once  diltiazem Infusion 10 mG/Hr (10 mL/Hr) IV Continuous <Continuous>  famotidine    Tablet 20 milliGRAM(s) Oral daily  gabapentin 100 milliGRAM(s) Oral at bedtime  glucagon  Injectable 1 milliGRAM(s) IntraMuscular once  insulin glargine Injectable (LANTUS) 10 Unit(s) SubCutaneous at bedtime  insulin lispro (ADMELOG) corrective regimen sliding scale   SubCutaneous at bedtime  insulin lispro (ADMELOG) corrective regimen sliding scale   SubCutaneous three times a day before meals  isosorbide   mononitrate ER Tablet (IMDUR) 30 milliGRAM(s) Oral daily  losartan 25 milliGRAM(s) Oral daily  methimazole 10 milliGRAM(s) Oral daily  metoprolol succinate ER 50 milliGRAM(s) Oral daily  pantoprazole    Tablet 40 milliGRAM(s) Oral before breakfast  piperacillin/tazobactam IVPB.. 3.375 Gram(s) IV Intermittent every 8 hours  saccharomyces boulardii 250 milliGRAM(s) Oral two times a day  sodium chloride 0.9%. 1000 milliLiter(s) (75 mL/Hr) IV Continuous <Continuous>  sucralfate 1 Gram(s) Oral four times a day  tamsulosin 0.8 milliGRAM(s) Oral at bedtime    MEDICATIONS  (PRN):  acetaminophen     Tablet .. 650 milliGRAM(s) Oral every 6 hours PRN Temp greater or equal to 38C (100.4F), Mild Pain (1 - 3)  magnesium hydroxide Suspension 30 milliLiter(s) Oral at bedtime PRN Constipation        T(C): 38 (12-20-23 @ 11:11), Max: 38 (12-20-23 @ 11:11)  HR: 140 (12-20-23 @ 11:11) (120 - 166)  BP: 125/70 (12-20-23 @ 11:11) (119/64 - 141/87)  RR: 22 (12-20-23 @ 11:11) (19 - 24)  SpO2: 95% (12-20-23 @ 11:11) (95% - 100%)  Wt(kg): --    PHYSICAL EXAM:  GENERAL: NAD, well-groomed, well-developed  HEAD:  Atraumatic, Normocephalic  NECK: Supple, No JVD, Normal thyroid  CHEST/LUNG: Clear to percussion bilaterally; No rales, rhonchi, wheezing, or rubs  HEART: Regular rate and rhythm; No murmurs, rubs, or gallops  ABDOMEN: Soft, Nontender, Nondistended; Bowel sounds present  EXTREMITIES:  2+ Peripheral Pulses, No clubbing, cyanosis, or edema  SKIN: No rashes or lesions    CAPILLARY BLOOD GLUCOSE                                13.3   6.32  )-----------( 187      ( 20 Dec 2023 08:20 )             39.7       CMP:  12-20 @ 08:20  SGPT 21  Albumin 3.3   Alk Phos 138   Anion Gap 6   SGOT 17   Total Bili 0.7   BUN 16   Calcium Total 9.1   CO2 26   Chloride 106   Creatinine 1.10   eGFR if AA --   eGFR if non AA --   Glucose 158   Potassium 4.0   Protein 7.5   Sodium 138      Thyroid Function Tests:      Diabetes Tests:       Radiology:

## 2023-12-20 NOTE — ED ADULT TRIAGE NOTE - CHIEF COMPLAINT QUOTE
From [facility].  SOB.  Increased HR. From [facility].  SOB.  Increased HR.

## 2023-12-20 NOTE — H&P ADULT - NSHPPHYSICALEXAM_GEN_ALL_CORE
gen no overt distress  heent edentulous   neck supple, no jvd  cor irreg, tachy  lungs cta  abd soft, NT  ext no edema  neuro a and o x3, non focal

## 2023-12-20 NOTE — CONSULT NOTE ADULT - ASSESSMENT
53-year-old male with a history of a CVA, chronic PE, atrial fibrillation, hypertension, BPH, type 2 diabetes, chronic Aguilera, hyperlipidemia, GERD, CAD, peripheral vascular disease, hypothyroid presents with brought [facility] by EMS from [facility] for generalized weakness, urinary symptoms, feeling of obstruction, tachycardia, RT CVA tenderness, bandemia  CT-Bilateral perinephric fat stranding, greater on the left which is a   nonspecific finding, howevermay reflect bilateral ascending urinary   tract infections.    RECOMMENDATIONS  1-Pyelonephritis compelling with consistent history, physical exam, imaging and lab  -will change to ceftriaxone for now (started 12/20)  -recs to follow based on micro    Thank you for consulting us and involving us in the management of this most interesting and challenging case.  We will follow along in the care of this patient. Please call us at [phone] or text me directly on my cell# at [phone] with any concerns.   53-year-old male with a history of a CVA, chronic PE, atrial fibrillation, hypertension, BPH, type 2 diabetes, chronic Aguilera, hyperlipidemia, GERD, CAD, peripheral vascular disease, hypothyroid presents with brought in by EMS from SNF for generalized weakness, urinary symptoms, feeling of obstruction, tachycardia, RT CVA tenderness, bandemia  CT-Bilateral perinephric fat stranding, greater on the left which is a   nonspecific finding, howevermay reflect bilateral ascending urinary   tract infections.    RECOMMENDATIONS  1-Pyelonephritis compelling with consistent history, physical exam, imaging and lab  -will change to ceftriaxone for now (started 12/20)  -recs to follow based on micro    Thank you for consulting us and involving us in the management of this most interesting and challenging case.  We will follow along in the care of this patient. Please call us at [phone] or text me directly on my cell# at [phone] with any concerns.

## 2023-12-20 NOTE — CONSULT NOTE ADULT - SUBJECTIVE AND OBJECTIVE BOX
Patient is a 53y old  Male who presents with a chief complaint of SOB, chills, lethargy.    HPI: Patient is a 54yo M with a PMH of CVA, chronic PE (on Eliquis), atrial fibrillation, hypertension, BPH, type 2 diabetes, chronic Jacques, hyperlipidemia, GERD, CAD (s/p stent to RCA), peripheral vascular disease, hypothyroid who presents to the ED for SOB, chills and lethargy. Patient states he woke up this AM and felt very short of breath and had chills. He also noted that his bladder was feeling "very full." Patient has a chronic jacques, and reports a hx of frequent UTIs. On arrival to the ED, patient was found to be in afib w/ RVR. He was given IV Cardizem x1 and then started on Cardizem gtt. Patient denies any feelings of chest pain or palpitations. He noted that he used to follow with Cardio Dr. [place], but has not followed up with him in many years.       PAST MEDICAL & SURGICAL HISTORY:  Diabetes      Diabetes mellitus with no complication      Afib      Hypertension      BPH (benign prostatic hyperplasia)      Perforated gastric ulcer  s/p emergent ex-lap omentopexy and plication 2019      Pulmonary embolism      History of non-ST elevation myocardial infarction (NSTEMI)      Osteomyelitis  s/p debridement      CAD S/P percutaneous coronary angioplasty      Cerebrovascular accident      H/O abdominal surgery      Perforated gastric ulcer      Traumatic amputation of left foot, initial encounter                ECHO   FINDINGS: EF 65%, mild MR, concentric LVH, Grade II Diastolic dysfunction       MEDICATIONS  (STANDING):  diltiazem Infusion 10 mG/Hr (10 mL/Hr) IV Continuous <Continuous>    MEDICATIONS  (PRN):      FAMILY HISTORY:  FH: pulmonary embolism  Mother    FH: coronary artery disease  Father    FH: stroke  Father      Denies Family history of CAD or early MI    ROS:  Constitutional: +chills  HEENT: denies blurry vision, difficulty hearing  Respiratory: +SOB, denies DUBOIS, cough  Cardiovascular: denies CP, palpitations, orthopnea, PND, LE edema  Gastrointestinal: denies nausea, vomiting, abdominal pain  Genitourinary: +full feeling in bladder   Skin: Denies rashes, itching  Neurologic: denies headache, weakness, dizziness  ROS negative except as noted above      SOCIAL HISTORY:    No tobacco, Alcohol or Ddrug use    Vital Signs Last 24 Hrs  T(C): 37.4 (20 Dec 2023 07:41), Max: 37.4 (20 Dec 2023 07:41)  T(F): 99.3 (20 Dec 2023 07:41), Max: 99.3 (20 Dec 2023 07:41)  HR: 130 (20 Dec 2023 10:37) (120 - 166)  BP: 119/64 (20 Dec 2023 10:37) (119/64 - 141/87)  BP(mean): --  RR: 24 (20 Dec 2023 10:37) (19 - 24)  SpO2: 98% (20 Dec 2023 10:37) (96% - 100%)    Parameters below as of 20 Dec 2023 10:37  Patient On (Oxygen Delivery Method): nasal cannula  O2 Flow (L/min): 3      Physical Exam:  General: Well developed, NAD  HEENT: NCAT, PERRLA, EOMI bl, moist mucous membranes   Neck: Supple, nontender, no mass  Neurology: A&Ox3, nonfocal, sensation intact   Respiratory: Mild expiratory wheezes b/l   CV: Tachycardic  Abdominal: Soft, NT, ND +BSx4, no palpable masses  Extremities: No C/C/E, + peripheral pulses  MSK: +b/l LE edema L>R   Heme: No obvious ecchymosis or petechiae   Skin: warm, dry, normal color      ECbpm, Afib w/ RVR     I&O's Detail      LABS:                        13.3   6.32  )-----------( 187      ( 20 Dec 2023 08:20 )             39.7     12-20    138  |  106  |  16  ----------------------------<  158<H>  4.0   |  26  |  1.10    Ca    9.1      20 Dec 2023 08:20    TPro  7.5  /  Alb  3.3  /  TBili  0.7  /  DBili  x   /  AST  17  /  ALT  21  /  AlkPhos  138<H>  12-20    CARDIAC MARKERS ( 20 Dec 2023 08:20 )  x     / x     / 78 U/L / x     / x          PT/INR - ( 20 Dec 2023 08:20 )   PT: 13.1 sec;   INR: 1.12 ratio         PTT - ( 20 Dec 2023 08:20 )  PTT:28.2 sec  Urinalysis Basic - ( 20 Dec 2023 09:00 )    Color: Yellow / Appearance: Cloudy / S.011 / pH: x  Gluc: x / Ketone: Negative mg/dL  / Bili: Negative / Urobili: 0.2 mg/dL   Blood: x / Protein: 100 mg/dL / Nitrite: Positive   Leuk Esterase: Large / RBC: x / WBC x   Sq Epi: x / Non Sq Epi: x / Bacteria: x      I&O's Summary    BNP 3508    RADIOLOGY & ADDITIONAL STUDIES:  ACC: [id number] EXAM:  CT RENAL STONE GIL   ORDERED BY: DEREK CHANCE     PROCEDURE DATE:  2023          INTERPRETATION:  CLINICAL INFORMATION: UTI. Retention. Sepsis. Evaluate   for stone.    COMPARISON: CT abdomen/pelvis 2022    CONTRAST/COMPLICATIONS:  IV Contrast: NONE  Oral Contrast: NONE  Complications: None reported at time of study completion    PROCEDURE:  CT of the Abdomen and Pelvis was performed.  Sagittal and coronal reformats were performed.    FINDINGS:  LOWER CHEST: Linear atelectasis or scarring at the lung bases. Coronary   artery calcifications.    LIVER: Within normal limits.  BILE DUCTS: Normal caliber.  GALLBLADDER: Normal caliber wall. No calcified gallstones. Small   calcifications along the periphery of the bladder, possibly granulomas.  SPLEEN: Within normal limits.  PANCREAS: Mildly atrophic with fatty infiltration.  ADRENALS: Within normal limits.  KIDNEYS/URETERS: Nonspecific bilateral perinephric fat stranding, greater   on the left. Mild fullness of the left renal pelvis. No hydronephrosis on   the right. No intrarenal calculi bilaterally. No hydroureter or ureteral   calculi bilaterally.    BLADDER: Underdistended with a Jacques catheter. Air in the urinary bladder   compatible with the instrumentation. Mild infiltration of the   perivesicular fat.  REPRODUCTIVE ORGANS: Prostate not enlarged.    BOWEL: No bowel obstruction. Appendix is normal. Scattered colonic   diverticula without evidence of diverticulitis. Moderate amount retained   fecal material in the right hemicolon and transverse colon.  PERITONEUM: No ascites.  VESSELS: Atherosclerotic changes. Abdominal aorta normal in caliber.   Retroaortic left renal vein, an anatomic variant.  RETROPERITONEUM/LYMPH NODES: Mildly enlarged left para-aortic lymph nodes   including a representative lymph node measuring 1.5 x 0.9 cm (series 2   image 49), previously 1.4 x 0.8 cm, not significantly changed  ABDOMINAL WALL: Fat-containing umbilical hernia.  BONES: Degenerative changes in the spine. No aggressive osseous lesion.   Old left-sided rib fractures.    IMPRESSION:    Mild infiltration of the perivesicular fat, likely cystitis.    Bilateral perinephric fat stranding, greater on the left which is a   nonspecific finding, however may reflect bilateral ascending urinary   tract infections.    Mild fullness of the left renal pelvis.    No hydronephrosis on the right.    No urinary tract calculus bilaterally.    --- End of Report ---      ACC: [id number] EXAM:  XR CHEST PORTABLE URGENT 1V   ORDERED BY: DEREK CHANCE     PROCEDURE DATE:  2023          INTERPRETATION:  EXAM: XR CHEST URGENT    INDICATION: weakness, tachy, urinary sx PXR    COMPARISON: 2022    IMPRESSION: Slight prominence to right infrahilar interstitial markings   some may be related to technique. Clinical correlation suggested.. Heart   is within normal limits in its transthoracic diameter. The apices are   suboptimally evaluated on present exam.    --- End of Report ---     Patient is a 53y old  Male who presents with a chief complaint of SOB, chills, lethargy.    HPI: Patient is a 54yo M with a PMH of CVA, chronic PE (on Eliquis), atrial fibrillation, hypertension, BPH, type 2 diabetes, chronic Jacques, hyperlipidemia, GERD, CAD (s/p stent to RCA), peripheral vascular disease, hypothyroid who presents to the ED for SOB, chills and lethargy. Patient states he woke up this AM and felt very short of breath and had chills. He also noted that his bladder was feeling "very full." Patient has a chronic jacques, and reports a hx of frequent UTIs. On arrival to the ED, patient was found to be in afib w/ RVR. He was given IV Cardizem x1 and then started on Cardizem gtt. Patient denies any feelings of chest pain or palpitations. He noted that he used to follow with Cardio Dr. [place], but has not followed up with him in many years.       PAST MEDICAL & SURGICAL HISTORY:  Diabetes      Diabetes mellitus with no complication      Afib      Hypertension      BPH (benign prostatic hyperplasia)      Perforated gastric ulcer  s/p emergent ex-lap omentopexy and plication 2019      Pulmonary embolism      History of non-ST elevation myocardial infarction (NSTEMI)      Osteomyelitis  s/p debridement      CAD S/P percutaneous coronary angioplasty      Cerebrovascular accident      H/O abdominal surgery      Perforated gastric ulcer      Traumatic amputation of left foot, initial encounter                ECHO   FINDINGS: EF 65%, mild MR, concentric LVH, Grade II Diastolic dysfunction       MEDICATIONS  (STANDING):  diltiazem Infusion 10 mG/Hr (10 mL/Hr) IV Continuous <Continuous>    MEDICATIONS  (PRN):      FAMILY HISTORY:  FH: pulmonary embolism  Mother    FH: coronary artery disease  Father    FH: stroke  Father      Denies Family history of CAD or early MI    ROS:  Constitutional: +chills  HEENT: denies blurry vision, difficulty hearing  Respiratory: +SOB, denies DUBOIS, cough  Cardiovascular: denies CP, palpitations, orthopnea, PND, LE edema  Gastrointestinal: denies nausea, vomiting, abdominal pain  Genitourinary: +full feeling in bladder   Skin: Denies rashes, itching  Neurologic: denies headache, weakness, dizziness  ROS negative except as noted above      SOCIAL HISTORY:    No tobacco, Alcohol or Ddrug use    Vital Signs Last 24 Hrs  T(C): 37.4 (20 Dec 2023 07:41), Max: 37.4 (20 Dec 2023 07:41)  T(F): 99.3 (20 Dec 2023 07:41), Max: 99.3 (20 Dec 2023 07:41)  HR: 130 (20 Dec 2023 10:37) (120 - 166)  BP: 119/64 (20 Dec 2023 10:37) (119/64 - 141/87)  BP(mean): --  RR: 24 (20 Dec 2023 10:37) (19 - 24)  SpO2: 98% (20 Dec 2023 10:37) (96% - 100%)    Parameters below as of 20 Dec 2023 10:37  Patient On (Oxygen Delivery Method): nasal cannula  O2 Flow (L/min): 3      Physical Exam:  General: Well developed, NAD  HEENT: NCAT, PERRLA, EOMI bl, moist mucous membranes   Neck: Supple, nontender, no mass  Neurology: A&Ox3, nonfocal, sensation intact   Respiratory: Mild expiratory wheezes b/l   CV: Tachycardic  Abdominal: Soft, NT, ND +BSx4, no palpable masses  Extremities: No C/C/E, + peripheral pulses  MSK: +b/l LE edema L>R   Heme: No obvious ecchymosis or petechiae   Skin: warm, dry, normal color      ECbpm, Afib w/ RVR     I&O's Detail      LABS:                        13.3   6.32  )-----------( 187      ( 20 Dec 2023 08:20 )             39.7     12-20    138  |  106  |  16  ----------------------------<  158<H>  4.0   |  26  |  1.10    Ca    9.1      20 Dec 2023 08:20    TPro  7.5  /  Alb  3.3  /  TBili  0.7  /  DBili  x   /  AST  17  /  ALT  21  /  AlkPhos  138<H>  12-20    CARDIAC MARKERS ( 20 Dec 2023 08:20 )  x     / x     / 78 U/L / x     / x          PT/INR - ( 20 Dec 2023 08:20 )   PT: 13.1 sec;   INR: 1.12 ratio         PTT - ( 20 Dec 2023 08:20 )  PTT:28.2 sec  Urinalysis Basic - ( 20 Dec 2023 09:00 )    Color: Yellow / Appearance: Cloudy / S.011 / pH: x  Gluc: x / Ketone: Negative mg/dL  / Bili: Negative / Urobili: 0.2 mg/dL   Blood: x / Protein: 100 mg/dL / Nitrite: Positive   Leuk Esterase: Large / RBC: x / WBC x   Sq Epi: x / Non Sq Epi: x / Bacteria: x      I&O's Summary    BNP 3508    RADIOLOGY & ADDITIONAL STUDIES:  ACC: [id number] EXAM:  CT RENAL STONE GIL   ORDERED BY: DEREK CHANCE     PROCEDURE DATE:  2023          INTERPRETATION:  CLINICAL INFORMATION: UTI. Retention. Sepsis. Evaluate   for stone.    COMPARISON: CT abdomen/pelvis 2022    CONTRAST/COMPLICATIONS:  IV Contrast: NONE  Oral Contrast: NONE  Complications: None reported at time of study completion    PROCEDURE:  CT of the Abdomen and Pelvis was performed.  Sagittal and coronal reformats were performed.    FINDINGS:  LOWER CHEST: Linear atelectasis or scarring at the lung bases. Coronary   artery calcifications.    LIVER: Within normal limits.  BILE DUCTS: Normal caliber.  GALLBLADDER: Normal caliber wall. No calcified gallstones. Small   calcifications along the periphery of the bladder, possibly granulomas.  SPLEEN: Within normal limits.  PANCREAS: Mildly atrophic with fatty infiltration.  ADRENALS: Within normal limits.  KIDNEYS/URETERS: Nonspecific bilateral perinephric fat stranding, greater   on the left. Mild fullness of the left renal pelvis. No hydronephrosis on   the right. No intrarenal calculi bilaterally. No hydroureter or ureteral   calculi bilaterally.    BLADDER: Underdistended with a Jacques catheter. Air in the urinary bladder   compatible with the instrumentation. Mild infiltration of the   perivesicular fat.  REPRODUCTIVE ORGANS: Prostate not enlarged.    BOWEL: No bowel obstruction. Appendix is normal. Scattered colonic   diverticula without evidence of diverticulitis. Moderate amount retained   fecal material in the right hemicolon and transverse colon.  PERITONEUM: No ascites.  VESSELS: Atherosclerotic changes. Abdominal aorta normal in caliber.   Retroaortic left renal vein, an anatomic variant.  RETROPERITONEUM/LYMPH NODES: Mildly enlarged left para-aortic lymph nodes   including a representative lymph node measuring 1.5 x 0.9 cm (series 2   image 49), previously 1.4 x 0.8 cm, not significantly changed  ABDOMINAL WALL: Fat-containing umbilical hernia.  BONES: Degenerative changes in the spine. No aggressive osseous lesion.   Old left-sided rib fractures.    IMPRESSION:    Mild infiltration of the perivesicular fat, likely cystitis.    Bilateral perinephric fat stranding, greater on the left which is a   nonspecific finding, however may reflect bilateral ascending urinary   tract infections.    Mild fullness of the left renal pelvis.    No hydronephrosis on the right.    No urinary tract calculus bilaterally.    --- End of Report ---      ACC: [id number] EXAM:  XR CHEST PORTABLE URGENT 1V   ORDERED BY: DEREK CHANCE     PROCEDURE DATE:  2023          INTERPRETATION:  EXAM: XR CHEST URGENT    INDICATION: weakness, tachy, urinary sx PXR    COMPARISON: 2022    IMPRESSION: Slight prominence to right infrahilar interstitial markings   some may be related to technique. Clinical correlation suggested.. Heart   is within normal limits in its transthoracic diameter. The apices are   suboptimally evaluated on present exam.    --- End of Report ---

## 2023-12-21 LAB
-  K. PNEUMONIAE GROUP: SIGNIFICANT CHANGE UP
-  K. PNEUMONIAE GROUP: SIGNIFICANT CHANGE UP
ANION GAP SERPL CALC-SCNC: 5 MMOL/L — SIGNIFICANT CHANGE UP (ref 5–17)
ANION GAP SERPL CALC-SCNC: 5 MMOL/L — SIGNIFICANT CHANGE UP (ref 5–17)
BUN SERPL-MCNC: 24 MG/DL — HIGH (ref 7–23)
BUN SERPL-MCNC: 24 MG/DL — HIGH (ref 7–23)
CALCIUM SERPL-MCNC: 7.9 MG/DL — LOW (ref 8.5–10.1)
CALCIUM SERPL-MCNC: 7.9 MG/DL — LOW (ref 8.5–10.1)
CHLORIDE SERPL-SCNC: 107 MMOL/L — SIGNIFICANT CHANGE UP (ref 96–108)
CHLORIDE SERPL-SCNC: 107 MMOL/L — SIGNIFICANT CHANGE UP (ref 96–108)
CO2 SERPL-SCNC: 27 MMOL/L — SIGNIFICANT CHANGE UP (ref 22–31)
CO2 SERPL-SCNC: 27 MMOL/L — SIGNIFICANT CHANGE UP (ref 22–31)
CREAT SERPL-MCNC: 1.4 MG/DL — HIGH (ref 0.5–1.3)
CREAT SERPL-MCNC: 1.4 MG/DL — HIGH (ref 0.5–1.3)
EGFR: 59 ML/MIN/1.73M2 — LOW
EGFR: 59 ML/MIN/1.73M2 — LOW
GLUCOSE BLDC GLUCOMTR-MCNC: 144 MG/DL — HIGH (ref 70–99)
GLUCOSE BLDC GLUCOMTR-MCNC: 177 MG/DL — HIGH (ref 70–99)
GLUCOSE BLDC GLUCOMTR-MCNC: 177 MG/DL — HIGH (ref 70–99)
GLUCOSE BLDC GLUCOMTR-MCNC: 181 MG/DL — HIGH (ref 70–99)
GLUCOSE BLDC GLUCOMTR-MCNC: 181 MG/DL — HIGH (ref 70–99)
GLUCOSE SERPL-MCNC: 149 MG/DL — HIGH (ref 70–99)
GLUCOSE SERPL-MCNC: 149 MG/DL — HIGH (ref 70–99)
HCT VFR BLD CALC: 36.1 % — LOW (ref 39–50)
HCT VFR BLD CALC: 36.1 % — LOW (ref 39–50)
HGB BLD-MCNC: 11.6 G/DL — LOW (ref 13–17)
HGB BLD-MCNC: 11.6 G/DL — LOW (ref 13–17)
MCHC RBC-ENTMCNC: 30.8 PG — SIGNIFICANT CHANGE UP (ref 27–34)
MCHC RBC-ENTMCNC: 30.8 PG — SIGNIFICANT CHANGE UP (ref 27–34)
MCHC RBC-ENTMCNC: 32.1 GM/DL — SIGNIFICANT CHANGE UP (ref 32–36)
MCHC RBC-ENTMCNC: 32.1 GM/DL — SIGNIFICANT CHANGE UP (ref 32–36)
MCV RBC AUTO: 95.8 FL — SIGNIFICANT CHANGE UP (ref 80–100)
MCV RBC AUTO: 95.8 FL — SIGNIFICANT CHANGE UP (ref 80–100)
METHOD TYPE: SIGNIFICANT CHANGE UP
METHOD TYPE: SIGNIFICANT CHANGE UP
NRBC # BLD: 0 /100 WBCS — SIGNIFICANT CHANGE UP (ref 0–0)
NRBC # BLD: 0 /100 WBCS — SIGNIFICANT CHANGE UP (ref 0–0)
PLATELET # BLD AUTO: 142 K/UL — LOW (ref 150–400)
PLATELET # BLD AUTO: 142 K/UL — LOW (ref 150–400)
POTASSIUM SERPL-MCNC: 4.4 MMOL/L — SIGNIFICANT CHANGE UP (ref 3.5–5.3)
POTASSIUM SERPL-MCNC: 4.4 MMOL/L — SIGNIFICANT CHANGE UP (ref 3.5–5.3)
POTASSIUM SERPL-SCNC: 4.4 MMOL/L — SIGNIFICANT CHANGE UP (ref 3.5–5.3)
POTASSIUM SERPL-SCNC: 4.4 MMOL/L — SIGNIFICANT CHANGE UP (ref 3.5–5.3)
RBC # BLD: 3.77 M/UL — LOW (ref 4.2–5.8)
RBC # BLD: 3.77 M/UL — LOW (ref 4.2–5.8)
RBC # FLD: 13.6 % — SIGNIFICANT CHANGE UP (ref 10.3–14.5)
RBC # FLD: 13.6 % — SIGNIFICANT CHANGE UP (ref 10.3–14.5)
SODIUM SERPL-SCNC: 139 MMOL/L — SIGNIFICANT CHANGE UP (ref 135–145)
SODIUM SERPL-SCNC: 139 MMOL/L — SIGNIFICANT CHANGE UP (ref 135–145)
T3FREE SERPL-MCNC: 1.19 PG/ML — LOW (ref 2–4.4)
T3FREE SERPL-MCNC: 1.19 PG/ML — LOW (ref 2–4.4)
T4 FREE SERPL-MCNC: 1.1 NG/DL — SIGNIFICANT CHANGE UP (ref 0.9–1.8)
T4 FREE SERPL-MCNC: 1.1 NG/DL — SIGNIFICANT CHANGE UP (ref 0.9–1.8)
TSH SERPL-MCNC: 6.29 UIU/ML — HIGH (ref 0.36–3.74)
TSH SERPL-MCNC: 6.29 UIU/ML — HIGH (ref 0.36–3.74)
WBC # BLD: 12.4 K/UL — HIGH (ref 3.8–10.5)
WBC # BLD: 12.4 K/UL — HIGH (ref 3.8–10.5)
WBC # FLD AUTO: 12.4 K/UL — HIGH (ref 3.8–10.5)
WBC # FLD AUTO: 12.4 K/UL — HIGH (ref 3.8–10.5)

## 2023-12-21 PROCEDURE — 99233 SBSQ HOSP IP/OBS HIGH 50: CPT

## 2023-12-21 RX ORDER — DILTIAZEM HCL 120 MG
10 CAPSULE, EXT RELEASE 24 HR ORAL
Qty: 125 | Refills: 0 | Status: DISCONTINUED | OUTPATIENT
Start: 2023-12-21 | End: 2023-12-22

## 2023-12-21 RX ORDER — ISOSORBIDE MONONITRATE 60 MG/1
1 TABLET, EXTENDED RELEASE ORAL
Qty: 0 | Refills: 0 | DISCHARGE

## 2023-12-21 RX ORDER — INSULIN GLARGINE 100 [IU]/ML
10 INJECTION, SOLUTION SUBCUTANEOUS
Qty: 0 | Refills: 0 | DISCHARGE

## 2023-12-21 RX ORDER — PHENAZOPYRIDINE HCL 100 MG
100 TABLET ORAL EVERY 8 HOURS
Refills: 0 | Status: DISCONTINUED | OUTPATIENT
Start: 2023-12-21 | End: 2023-12-26

## 2023-12-21 RX ORDER — METHIMAZOLE 10 MG/1
1 TABLET ORAL
Qty: 0 | Refills: 0 | DISCHARGE

## 2023-12-21 RX ORDER — METOPROLOL TARTRATE 50 MG
25 TABLET ORAL EVERY 6 HOURS
Refills: 0 | Status: DISCONTINUED | OUTPATIENT
Start: 2023-12-21 | End: 2023-12-22

## 2023-12-21 RX ORDER — INSULIN LISPRO 100/ML
3 VIAL (ML) SUBCUTANEOUS
Qty: 0 | Refills: 0 | DISCHARGE

## 2023-12-21 RX ORDER — PANTOPRAZOLE SODIUM 20 MG/1
1 TABLET, DELAYED RELEASE ORAL
Qty: 0 | Refills: 0 | DISCHARGE

## 2023-12-21 RX ORDER — SUCRALFATE 1 G
1 TABLET ORAL
Qty: 0 | Refills: 0 | DISCHARGE

## 2023-12-21 RX ORDER — METOPROLOL TARTRATE 50 MG
25 TABLET ORAL
Refills: 0 | Status: DISCONTINUED | OUTPATIENT
Start: 2023-12-21 | End: 2023-12-21

## 2023-12-21 RX ORDER — DILTIAZEM HCL 120 MG
10 CAPSULE, EXT RELEASE 24 HR ORAL
Qty: 125 | Refills: 0 | Status: DISCONTINUED | OUTPATIENT
Start: 2023-12-21 | End: 2023-12-21

## 2023-12-21 RX ORDER — TAMSULOSIN HYDROCHLORIDE 0.4 MG/1
2 CAPSULE ORAL
Qty: 0 | Refills: 0 | DISCHARGE

## 2023-12-21 RX ORDER — APIXABAN 2.5 MG/1
1 TABLET, FILM COATED ORAL
Qty: 0 | Refills: 0 | DISCHARGE

## 2023-12-21 RX ORDER — ACETAMINOPHEN 500 MG
2 TABLET ORAL
Qty: 0 | Refills: 0 | DISCHARGE

## 2023-12-21 RX ORDER — LOSARTAN POTASSIUM 100 MG/1
1 TABLET, FILM COATED ORAL
Qty: 0 | Refills: 0 | DISCHARGE

## 2023-12-21 RX ORDER — MAGNESIUM HYDROXIDE 400 MG/1
30 TABLET, CHEWABLE ORAL
Qty: 0 | Refills: 0 | DISCHARGE

## 2023-12-21 RX ORDER — ONDANSETRON 8 MG/1
1 TABLET, FILM COATED ORAL
Qty: 0 | Refills: 0 | DISCHARGE

## 2023-12-21 RX ORDER — BETHANECHOL CHLORIDE 25 MG
1 TABLET ORAL
Qty: 0 | Refills: 0 | DISCHARGE

## 2023-12-21 RX ADMIN — Medication 25 MILLIGRAM(S): at 06:14

## 2023-12-21 RX ADMIN — Medication 100 MILLIGRAM(S): at 22:40

## 2023-12-21 RX ADMIN — CEFTRIAXONE 100 MILLIGRAM(S): 500 INJECTION, POWDER, FOR SOLUTION INTRAMUSCULAR; INTRAVENOUS at 13:59

## 2023-12-21 RX ADMIN — Medication 81 MILLIGRAM(S): at 12:20

## 2023-12-21 RX ADMIN — Medication 1 GRAM(S): at 18:39

## 2023-12-21 RX ADMIN — Medication 100 MILLIGRAM(S): at 14:13

## 2023-12-21 RX ADMIN — APIXABAN 5 MILLIGRAM(S): 2.5 TABLET, FILM COATED ORAL at 05:55

## 2023-12-21 RX ADMIN — ATORVASTATIN CALCIUM 40 MILLIGRAM(S): 80 TABLET, FILM COATED ORAL at 22:42

## 2023-12-21 RX ADMIN — Medication 1 GRAM(S): at 00:43

## 2023-12-21 RX ADMIN — GABAPENTIN 100 MILLIGRAM(S): 400 CAPSULE ORAL at 22:41

## 2023-12-21 RX ADMIN — FAMOTIDINE 20 MILLIGRAM(S): 10 INJECTION INTRAVENOUS at 12:21

## 2023-12-21 RX ADMIN — Medication 25 MILLIGRAM(S): at 14:12

## 2023-12-21 RX ADMIN — Medication 10 MG/HR: at 10:14

## 2023-12-21 RX ADMIN — Medication 1 GRAM(S): at 05:55

## 2023-12-21 RX ADMIN — Medication 1 GRAM(S): at 12:20

## 2023-12-21 RX ADMIN — Medication 250 MILLIGRAM(S): at 05:56

## 2023-12-21 RX ADMIN — Medication 25 MILLIGRAM(S): at 22:44

## 2023-12-21 RX ADMIN — Medication 25 MILLIGRAM(S): at 12:19

## 2023-12-21 RX ADMIN — Medication 10 MG/HR: at 12:05

## 2023-12-21 RX ADMIN — Medication 650 MILLIGRAM(S): at 03:53

## 2023-12-21 RX ADMIN — Medication 650 MILLIGRAM(S): at 02:53

## 2023-12-21 RX ADMIN — TAMSULOSIN HYDROCHLORIDE 0.8 MILLIGRAM(S): 0.4 CAPSULE ORAL at 22:41

## 2023-12-21 RX ADMIN — INSULIN GLARGINE 10 UNIT(S): 100 INJECTION, SOLUTION SUBCUTANEOUS at 22:42

## 2023-12-21 RX ADMIN — Medication 25 MILLIGRAM(S): at 05:55

## 2023-12-21 RX ADMIN — APIXABAN 5 MILLIGRAM(S): 2.5 TABLET, FILM COATED ORAL at 18:38

## 2023-12-21 RX ADMIN — Medication 10 MG/HR: at 23:44

## 2023-12-21 RX ADMIN — LOSARTAN POTASSIUM 25 MILLIGRAM(S): 100 TABLET, FILM COATED ORAL at 05:55

## 2023-12-21 RX ADMIN — Medication 2: at 12:21

## 2023-12-21 RX ADMIN — Medication 250 MILLIGRAM(S): at 18:39

## 2023-12-21 RX ADMIN — Medication 25 MILLIGRAM(S): at 18:39

## 2023-12-21 RX ADMIN — PANTOPRAZOLE SODIUM 40 MILLIGRAM(S): 20 TABLET, DELAYED RELEASE ORAL at 05:55

## 2023-12-21 NOTE — CHART NOTE - NSCHARTNOTEFT_GEN_A_CORE
Called by RN for patient with bp 90s/60s on cardizem drip at 10/hr. Patient seen at the bedside, he is in no acute distress and states he feels well. BP rechecked personally at 100/65. Tele reviewed; patient fluctuating between 100s-120s afib.     Will continue with cardizem 10/hr for now, recheck bp in am. If bp continues to be low in the morning, may have to consider dig Called by RN for patient with bp 90s/60s on cardizem drip at 10/hr. Patient seen at the bedside, he is in no acute distress and states he feels well. BP rechecked personally at 100/65. Tele reviewed; patient fluctuating between 100s-120s afib.     Will continue with cardizem 10/hr for now, recheck bp in am. If bp continues to be low in the morning, may have to consider dig  transitioned met succinate 50 po qd to met tartrate 25 po bid as per cardio recs Called by RN for patient with bp 90s/60s on cardizem drip at 10/hr. Patient seen at the bedside, he is in no acute distress and states he feels well. BP rechecked personally at 100/65. Tele reviewed; patient fluctuating between 100s-120s afib.     Will continue with cardizem 10/hr for now, recheck bp in am. If bp continues to be low in the morning, may have to consider holding and starting digoxin pending cardio recs  transitioned met succinate 50 po qd to met tartrate 25 po bid as per cardio recs Called by RN for patient with bp 90s/60s on cardizem drip at 10/hr. Patient seen at the bedside, he is in no acute distress and states he feels well. BP rechecked personally at 100/65. Tele reviewed; patient fluctuating between 100s-120s afib.     Will continue with cardizem 10/hr for now, recheck bp in am. If bp continues to be low in the morning, may have to consider holding and starting digoxin pending cardio recs  transitioned met succinate 50 po qd to met tartrate 25 po bid as per cardio recs    addendum    AM bp is normal. Continue cardizem drip for now w/ likely discontinuation / switch to PO later this am; await cardio recs

## 2023-12-21 NOTE — CARE COORDINATION ASSESSMENT. - NSCAREPROVIDERS_GEN_ALL_CORE_FT
CARE PROVIDERS:  Accepting Physician: Hill Brizuela  Administration: Koko Gómez  Administration: Jackelyn Andrew  Administration: Stephany Mcgee  Administration: Venu Estrada  Administration: Luis Carlos Main  Admitting: Hill Brizuela  Attending: Hill Brizuela  Consultant: Juventino Soto  Consultant: Perlman, Craig  Consultant: Tai Sweeney  Consultant: Danny Arce  Consultant: Juventino Whitten  Consultant: Weil, Patricia  Consultant: Yissel Alvarez  Covering Team: Alirio Black  Covering Team: Perlman, Daryl  ED Attending: Neymar Patel  ED Nurse: Diamante Mendez  Nurse: Rebeca Fernandez  Nurse: Lydia Cordero  Nurse: Ania Estrada  Nurse: Janine Gomez  Ordered: ADM, User  Ordered: ServiceAccount, SCMMLM  Outpatient Provider: Grisel Murray  Override: Rebeca Fernandez  PCA/Nursing Assistant: Kimmy Nash  Respiratory Therapy: Dilcia Zuleta  : Linda Leiva   CARE PROVIDERS:  Accepting Physician: Hill Brizuela  Administration: Koko Gómez  Administration: Jackelyn Andrew  Administration: Stephany Mcgee  Administration: Venu Estrada  Administration: Lui sCarlos Main  Admitting: Hill Brizuela  Attending: Hill Brizuela  Consultant: Juventino Soto  Consultant: Perlman, Craig  Consultant: Tai Sweeney  Consultant: Danny Arce  Consultant: Juventino Whitten  Consultant: Weil, Patricia  Consultant: Yissel Alvarez  Covering Team: Alirio Black  Covering Team: Perlman, Daryl  ED Attending: Neymar Patel  ED Nurse: Diamante Mendez  Nurse: Rebeca Fernandez  Nurse: Lydia Cordero  Nurse: Ania Estrada  Nurse: Janine Gomez  Ordered: ADM, User  Ordered: ServiceAccount, SCMMLM  Outpatient Provider: Grisel Murray  Override: Rebeca Fernandez  PCA/Nursing Assistant: Kimmy Nash  Respiratory Therapy: Dilcia Zuleta  : Linda Leiva

## 2023-12-21 NOTE — PROGRESS NOTE ADULT - SUBJECTIVE AND OBJECTIVE BOX
[facility] DIV[facility] of INFECTIOUS DISEASE  Juventino Whitten MD PhD, Symone Hickey MD, Anne-Marie Li MD, Jeffery Jacobs MD, Ryan Romeo MD  and providing coverage with Melody Armstrong MD  Providing Infectious Disease Consultations at [facility], [facility], [facility], [facility]'s    Office# [phone] to schedule follow up appointments  Answering Service for urgent calls or New Consults [phone]  Cell# to text for urgent issues Juventino Whitten [phone]     infectious diseases progress note:    SARAH MORRISON is a 53y y. o. Male patient    Overnight and events of the last 24hrs reviewed    Allergies    No Known Drug Allergies  fish (Hives)    Intolerances        ANTIBIOTICS/RELEVANT:  antimicrobials  cefTRIAXone   IVPB 2000 milliGRAM(s) IV Intermittent every 24 hours    immunologic:    OTHER:  acetaminophen     Tablet .. 650 milliGRAM(s) Oral every 6 hours PRN  apixaban 5 milliGRAM(s) Oral every 12 hours  aspirin  chewable 81 milliGRAM(s) Oral daily  atorvastatin 40 milliGRAM(s) Oral at bedtime  bethanechol 25 milliGRAM(s) Oral three times a day  dextrose 50% Injectable 25 Gram(s) IV Push once  dextrose 50% Injectable 12.5 Gram(s) IV Push once  dextrose 50% Injectable 25 Gram(s) IV Push once  dextrose Oral Gel 15 Gram(s) Oral once  diltiazem Infusion 10 mG/Hr IV Continuous <Continuous>  famotidine    Tablet 20 milliGRAM(s) Oral daily  gabapentin 100 milliGRAM(s) Oral at bedtime  glucagon  Injectable 1 milliGRAM(s) IntraMuscular once  insulin glargine Injectable (LANTUS) 10 Unit(s) SubCutaneous at bedtime  insulin lispro (ADMELOG) corrective regimen sliding scale   SubCutaneous three times a day before meals  insulin lispro (ADMELOG) corrective regimen sliding scale   SubCutaneous at bedtime  magnesium hydroxide Suspension 30 milliLiter(s) Oral at bedtime PRN  methimazole 10 milliGRAM(s) Oral daily  metoprolol tartrate 25 milliGRAM(s) Oral every 6 hours  pantoprazole    Tablet 40 milliGRAM(s) Oral before breakfast  saccharomyces boulardii 250 milliGRAM(s) Oral two times a day  sodium chloride 0.9%. 1000 milliLiter(s) IV Continuous <Continuous>  sucralfate 1 Gram(s) Oral four times a day  tamsulosin 0.8 milliGRAM(s) Oral at bedtime      Objective:  Vital Signs Last 24 Hrs  T(C): 36.9 (21 Dec 2023 05:10), Max: 38.7 (21 Dec 2023 03:25)  T(F): 98.5 (21 Dec 2023 05:10), Max: 101.7 (21 Dec 2023 03:25)  HR: 93 (21 Dec 2023 05:10) (92 - 106)  BP: 112/70 (21 Dec 2023 05:10) (96/64 - 112/70)  BP(mean): --  RR: 19 (21 Dec 2023 05:10) (17 - 27)  SpO2: 94% (21 Dec 2023 05:10) (92% - 96%)    Parameters below as of 21 Dec 2023 05:10  Patient On (Oxygen Delivery Method): nasal cannula  O2 Flow (L/min): 2      T(C): 36.9 (12-21-23 @ 05:10), Max: 38.7 (12-21-23 @ 03:25)  T(C): 36.9 (12-21-23 @ 05:10), Max: 38.7 (12-21-23 @ 03:25)  T(C): 36.9 (12-21-23 @ 05:10), Max: 38.7 (12-21-23 @ 03:25)    PHYSICAL EXAM:  HEENT: NC atraumatic  Neck: supple  Respiratory: no accessory muscle use, breathing comfortably  Cardiovascular: distant  Gastrointestinal: normal appearing, nondistended  Extremities: no clubbing, no cyanosis,        LABS:                          11.6   12.40 )-----------( 142      ( 21 Dec 2023 04:45 )             36.1       WBC  12.40 12-21 @ 04:45  6.32 12-20 @ 08:20      12-21    139  |  107  |  24<H>  ----------------------------<  149<H>  4.4   |  27  |  1.40<H>    Ca    7.9<L>      21 Dec 2023 04:45    TPro  7.5  /  Alb  3.3  /  TBili  0.7  /  DBili  x   /  AST  17  /  ALT  21  /  AlkPhos  138<H>  12-20      Creatinine: 1.40 mg/dL (12-21-23 @ 04:45)  Creatinine: 1.10 mg/dL (12-20-23 @ 08:20)      PT/INR - ( 20 Dec 2023 08:20 )   PT: 13.1 sec;   INR: 1.12 ratio         PTT - ( 20 Dec 2023 08:20 )  PTT:28.2 sec  Urinalysis Basic - ( 21 Dec 2023 04:45 )    Color: x / Appearance: x / SG: x / pH: x  Gluc: 149 mg/dL / Ketone: x  / Bili: x / Urobili: x   Blood: x / Protein: x / Nitrite: x   Leuk Esterase: x / RBC: x / WBC x   Sq Epi: x / Non Sq Epi: x / Bacteria: x            INFLAMMATORY MARKERS      MICROBIOLOGY:    Culture - Blood (12.20.23 @ 08:15)    -  K. pneumoniae group: Detec (K. pneumoniae, K. quasipneumoniae, K. variicola)   Gram Stain:   Growth in aerobic bottle: Gram Negative Rods  Growth in anaerobic bottle: Gram Negative Rods   Specimen Source: .Blood Blood-Peripheral   Organism: Blood Culture PCR   Culture Results:   Growth in aerobic bottle: Gram Negative Rods  Direct identification is available within approximately 3-5  hours either by Blood Panel Multiplexed PCR or Direct  MALDI-TOF. Details: [email or web address]  Growth in anaerobic bottle: Gram Negative Rods   Organism Identification: Blood Culture PCR   Method Type: PCR        RADIOLOGY & ADDITIONAL STUDIES:   [facility] DIV[facility] of INFECTIOUS DISEASE  Juventino Whitten MD PhD, Symone Hickey MD, Anne-Marie Li MD, Jeffery Jacobs MD, Ryan Romeo MD  and providing coverage with Melody Armstrong MD  Providing Infectious Disease Consultations at [facility], [facility], [facility], [facility]'s    Office# [phone] to schedule follow up appointments  Answering Service for urgent calls or New Consults [phone]  Cell# to text for urgent issues Juventino Whitten [phone]     infectious diseases progress note:    SARAH MORRISON is a 53y y. o. Male patient    Overnight and events of the last 24hrs reviewed    Allergies    No Known Drug Allergies  fish (Hives)    Intolerances        ANTIBIOTICS/RELEVANT:  antimicrobials  cefTRIAXone   IVPB 2000 milliGRAM(s) IV Intermittent every 24 hours    immunologic:    OTHER:  acetaminophen     Tablet .. 650 milliGRAM(s) Oral every 6 hours PRN  apixaban 5 milliGRAM(s) Oral every 12 hours  aspirin  chewable 81 milliGRAM(s) Oral daily  atorvastatin 40 milliGRAM(s) Oral at bedtime  bethanechol 25 milliGRAM(s) Oral three times a day  dextrose 50% Injectable 25 Gram(s) IV Push once  dextrose 50% Injectable 12.5 Gram(s) IV Push once  dextrose 50% Injectable 25 Gram(s) IV Push once  dextrose Oral Gel 15 Gram(s) Oral once  diltiazem Infusion 10 mG/Hr IV Continuous <Continuous>  famotidine    Tablet 20 milliGRAM(s) Oral daily  gabapentin 100 milliGRAM(s) Oral at bedtime  glucagon  Injectable 1 milliGRAM(s) IntraMuscular once  insulin glargine Injectable (LANTUS) 10 Unit(s) SubCutaneous at bedtime  insulin lispro (ADMELOG) corrective regimen sliding scale   SubCutaneous three times a day before meals  insulin lispro (ADMELOG) corrective regimen sliding scale   SubCutaneous at bedtime  magnesium hydroxide Suspension 30 milliLiter(s) Oral at bedtime PRN  methimazole 10 milliGRAM(s) Oral daily  metoprolol tartrate 25 milliGRAM(s) Oral every 6 hours  pantoprazole    Tablet 40 milliGRAM(s) Oral before breakfast  saccharomyces boulardii 250 milliGRAM(s) Oral two times a day  sodium chloride 0.9%. 1000 milliLiter(s) IV Continuous <Continuous>  sucralfate 1 Gram(s) Oral four times a day  tamsulosin 0.8 milliGRAM(s) Oral at bedtime      Objective:  Vital Signs Last 24 Hrs  T(C): 36.9 (21 Dec 2023 05:10), Max: 38.7 (21 Dec 2023 03:25)  T(F): 98.5 (21 Dec 2023 05:10), Max: 101.7 (21 Dec 2023 03:25)  HR: 93 (21 Dec 2023 05:10) (92 - 106)  BP: 112/70 (21 Dec 2023 05:10) (96/64 - 112/70)  BP(mean): --  RR: 19 (21 Dec 2023 05:10) (17 - 27)  SpO2: 94% (21 Dec 2023 05:10) (92% - 96%)    Parameters below as of 21 Dec 2023 05:10  Patient On (Oxygen Delivery Method): nasal cannula  O2 Flow (L/min): 2      T(C): 36.9 (12-21-23 @ 05:10), Max: 38.7 (12-21-23 @ 03:25)  T(C): 36.9 (12-21-23 @ 05:10), Max: 38.7 (12-21-23 @ 03:25)  T(C): 36.9 (12-21-23 @ 05:10), Max: 38.7 (12-21-23 @ 03:25)    PHYSICAL EXAM:  HEENT: NC atraumatic  Neck: supple  Respiratory: no accessory muscle use, breathing comfortably  Cardiovascular: distant  Gastrointestinal: normal appearing, nondistended  Extremities: no clubbing, no cyanosis,        LABS:                          11.6   12.40 )-----------( 142      ( 21 Dec 2023 04:45 )             36.1       WBC  12.40 12-21 @ 04:45  6.32 12-20 @ 08:20      12-21    139  |  107  |  24<H>  ----------------------------<  149<H>  4.4   |  27  |  1.40<H>    Ca    7.9<L>      21 Dec 2023 04:45    TPro  7.5  /  Alb  3.3  /  TBili  0.7  /  DBili  x   /  AST  17  /  ALT  21  /  AlkPhos  138<H>  12-20      Creatinine: 1.40 mg/dL (12-21-23 @ 04:45)  Creatinine: 1.10 mg/dL (12-20-23 @ 08:20)      PT/INR - ( 20 Dec 2023 08:20 )   PT: 13.1 sec;   INR: 1.12 ratio         PTT - ( 20 Dec 2023 08:20 )  PTT:28.2 sec  Urinalysis Basic - ( 21 Dec 2023 04:45 )    Color: x / Appearance: x / SG: x / pH: x  Gluc: 149 mg/dL / Ketone: x  / Bili: x / Urobili: x   Blood: x / Protein: x / Nitrite: x   Leuk Esterase: x / RBC: x / WBC x   Sq Epi: x / Non Sq Epi: x / Bacteria: x            INFLAMMATORY MARKERS      MICROBIOLOGY:    Culture - Blood (12.20.23 @ 08:15)    -  K. pneumoniae group: Detec (K. pneumoniae, K. quasipneumoniae, K. variicola)   Gram Stain:   Growth in aerobic bottle: Gram Negative Rods  Growth in anaerobic bottle: Gram Negative Rods   Specimen Source: .Blood Blood-Peripheral   Organism: Blood Culture PCR   Culture Results:   Growth in aerobic bottle: Gram Negative Rods  Direct identification is available within approximately 3-5  hours either by Blood Panel Multiplexed PCR or Direct  MALDI-TOF. Details: [email or web address]  Growth in anaerobic bottle: Gram Negative Rods   Organism Identification: Blood Culture PCR   Method Type: PCR        RADIOLOGY & ADDITIONAL STUDIES:

## 2023-12-21 NOTE — PHYSICAL THERAPY INITIAL EVALUATION ADULT - FUNCTIONAL LIMITATIONS, PT EVAL
62 y/o F pt presents to the ED c/o intermittent abdominal distension x 6 months. Pt reports that her abdomen becomes distended after eating any meal and as a result she has been eating less than normal. Reports she was seen by PMD Dr Rasmussen 2 weeks ago for same symptoms, was advised to take Align and went for a CT scan which revealed kidney stones. Pt unable to provide any other information about past visit and conversation about results with PMD. Endorses last BM today, described as small but normal consistency. Most recent routine colonoscopy was last october which was WNL. Pt denies alcohol use or pain killer use. Denies past hx of abdominal surgeries. Pt denies fever, vomiting, diarrhea, constipation, blood in stool, abd pain or any other complaints at this time.  PMD Dr Rasmussen   Cardio Dr Israel
self-care/home management

## 2023-12-21 NOTE — CARE COORDINATION ASSESSMENT. - NSPASTMEDSURGHISTORY_GEN_ALL_CORE_FT
PAST MEDICAL & SURGICAL HISTORY:  Diabetes      Diabetes mellitus with no complication      BPH (benign prostatic hyperplasia)      Hypertension      Afib      H/O abdominal surgery      Osteomyelitis  s/p debridement      History of non-ST elevation myocardial infarction (NSTEMI)      Pulmonary embolism      Perforated gastric ulcer  s/p emergent ex-lap omentopexy and plication 6/2019      Perforated gastric ulcer      Traumatic amputation of left foot, initial encounter      CAD S/P percutaneous coronary angioplasty      Cerebrovascular accident

## 2023-12-21 NOTE — PROGRESS NOTE ADULT - SUBJECTIVE AND OBJECTIVE BOX
Date of Service 12-21-23 @ 16:06    Patient is a 53y old  Male who presents with a chief complaint of uti (21 Dec 2023 11:33)      INTERVAL /OVERNIGHT EVENTS: c/o dysuria per RN    MEDICATIONS  (STANDING):  apixaban 5 milliGRAM(s) Oral every 12 hours  aspirin  chewable 81 milliGRAM(s) Oral daily  atorvastatin 40 milliGRAM(s) Oral at bedtime  bethanechol 25 milliGRAM(s) Oral three times a day  cefTRIAXone   IVPB 2000 milliGRAM(s) IV Intermittent every 24 hours  dextrose 50% Injectable 25 Gram(s) IV Push once  dextrose 50% Injectable 25 Gram(s) IV Push once  dextrose 50% Injectable 12.5 Gram(s) IV Push once  dextrose Oral Gel 15 Gram(s) Oral once  diltiazem Infusion 10 mG/Hr (10 mL/Hr) IV Continuous <Continuous>  famotidine    Tablet 20 milliGRAM(s) Oral daily  gabapentin 100 milliGRAM(s) Oral at bedtime  glucagon  Injectable 1 milliGRAM(s) IntraMuscular once  insulin glargine Injectable (LANTUS) 10 Unit(s) SubCutaneous at bedtime  insulin lispro (ADMELOG) corrective regimen sliding scale   SubCutaneous three times a day before meals  insulin lispro (ADMELOG) corrective regimen sliding scale   SubCutaneous at bedtime  methimazole 10 milliGRAM(s) Oral daily  metoprolol tartrate 25 milliGRAM(s) Oral every 6 hours  pantoprazole    Tablet 40 milliGRAM(s) Oral before breakfast  phenazopyridine 100 milliGRAM(s) Oral every 8 hours  saccharomyces boulardii 250 milliGRAM(s) Oral two times a day  sodium chloride 0.9%. 1000 milliLiter(s) (75 mL/Hr) IV Continuous <Continuous>  sucralfate 1 Gram(s) Oral four times a day  tamsulosin 0.8 milliGRAM(s) Oral at bedtime    MEDICATIONS  (PRN):  acetaminophen     Tablet .. 650 milliGRAM(s) Oral every 6 hours PRN Temp greater or equal to 38C (100.4F), Mild Pain (1 - 3)  magnesium hydroxide Suspension 30 milliLiter(s) Oral at bedtime PRN Constipation      Allergies    No Known Drug Allergies  fish (Hives)    Intolerances        REVIEW OF SYSTEMS:  denies    Vital Signs Last 24 Hrs  T(C): 36.9 (21 Dec 2023 12:10), Max: 38.7 (21 Dec 2023 03:25)  T(F): 98.4 (21 Dec 2023 12:10), Max: 101.7 (21 Dec 2023 03:25)  HR: 88 (21 Dec 2023 12:10) (88 - 106)  BP: 116/75 (21 Dec 2023 12:10) (96/64 - 116/75)  BP(mean): --  RR: 19 (21 Dec 2023 12:10) (17 - 27)  SpO2: 88% (21 Dec 2023 12:10) (88% - 96%)    Parameters below as of 21 Dec 2023 12:10  Patient On (Oxygen Delivery Method): nasal cannula        PHYSICAL EXAM:  GENERAL: NAD, well-groomed, well-developed  HEAD:  Atraumatic, Normocephalic  EYES: EOMI, PERRLA, conjunctiva and sclera clear  ENMT: No tonsillar erythema, exudates, or enlargement; Moist mucous membranes, Good dentition, No lesions  NECK: Supple, No JVD, Normal thyroid  NERVOUS SYSTEM:  Alert & Oriented X3, Good concentration; Motor Strength 5/5 B/L upper and lower extremities; DTRs 2+ intact and symmetric  CHEST/LUNG: Clear to auscultation bilaterally; No rales, rhonchi, wheezing, or rubs  HEART: Regular rate and rhythm; No murmurs, rubs, or gallops  ABDOMEN: Soft, Nontender, Nondistended; Bowel sounds present  EXTREMITIES:  2+ Peripheral Pulses, No clubbing, cyanosis, or edema  LYMPH: No lymphadenopathy noted  SKIN: No rashes or lesions    LABS:                        11.6   12.40 )-----------( 142      ( 21 Dec 2023 04:45 )             36.1     21 Dec 2023 04:45    139    |  107    |  24     ----------------------------<  149    4.4     |  27     |  1.40     Ca    7.9        21 Dec 2023 04:45      PT/INR - ( 20 Dec 2023 08:20 )   PT: 13.1 sec;   INR: 1.12 ratio         PTT - ( 20 Dec 2023 08:20 )  PTT:28.2 sec  Urinalysis Basic - ( 21 Dec 2023 04:45 )    Color: x / Appearance: x / SG: x / pH: x  Gluc: 149 mg/dL / Ketone: x  / Bili: x / Urobili: x   Blood: x / Protein: x / Nitrite: x   Leuk Esterase: x / RBC: x / WBC x   Sq Epi: x / Non Sq Epi: x / Bacteria: x      CAPILLARY BLOOD GLUCOSE      POCT Blood Glucose.: 181 mg/dL (21 Dec 2023 11:56)  POCT Blood Glucose.: 144 mg/dL (21 Dec 2023 08:20)  POCT Blood Glucose.: 129 mg/dL (20 Dec 2023 23:02)  POCT Blood Glucose.: 135 mg/dL (20 Dec 2023 18:01)      RADIOLOGY & ADDITIONAL TESTS:    Notes Reviewed:  [x ] YES  [ ] NO    Care Discussed with Consultants/Other Providers [x ] YES  [ ] NO

## 2023-12-21 NOTE — PROGRESS NOTE ADULT - NS ATTEND AMEND GEN_ALL_CORE FT
54yo M with a PMH of CVA, chronic PE (on Eliquis), atrial fibrillation, hypertension, BPH, type 2 diabetes, chronic Aguilera, hyperlipidemia, GERD, CAD (s/p stent to RCA), peripheral vascular disease, hypothyroid who presents to the ED for SOB, chills and lethargy, found to be in rapid Afib w/ RVR. Patient states he used to follow with Cardio Dr. [place], but has not followed up with Cardio in many years.     - Hx of PAF but no clear documentation on whether the patient has had recent episodes of Afib  - Previously on Amiodarone, but d/c due to patient having negative side effects affecting his thyroid   - Continue Cardizem gtt, would up titrate to 15 mg/hr  - Tele w/ A fib 80-110s   - Patient is already on Eliquis for chronic PE --> continue Eliquis for AC   - Continue Metoprolol tartrate 50mg BID and titrate up if needed   - Hx of CAD s/p 1 stent to RCA   - Hold ASA for now despite RCA stent as patient high risk for bleeding  - Continue statin   - Continue home Lasix 40mg PO daily   - Repeat TTE   - BP stable and controlled  - Hold home Losartan and Imdur to allow for AV yung agent titration

## 2023-12-21 NOTE — PHYSICAL THERAPY INITIAL EVALUATION ADULT - PERTINENT HX OF CURRENT PROBLEM, REHAB EVAL
53-year-old male with a history of a CVA, chronic PE, atrial fibrillation, hypertension, BPH, type 2 diabetes, chronic Aguilera, hyperlipidemia, GERD, CAD, peripheral vascular disease, hypothyroid presents with brought in by EMS from [facility] for generalized weakness, urinary symptoms, feeling of obstruction, tachycardia, possible infection.  No nausea or vomiting.  No acute diarrhea.  No chest pain or shortness of breath.  Patient complaining of lower abdominal discomfort.  No neck or back pain.  No aggravating or alleviating factors otherwise noted.  No other acute injury or complaints.  Patient immunizations up-to-date. 53-year-old male with a history of a CVA, chronic PE, atrial fibrillation, hypertension, BPH, type 2 diabetes, chronic Aguilera, hyperlipidemia, GERD, CAD, peripheral vascular disease, hypothyroid presents with brought in by EMS from [facility] for generalized weakness, urinary symptoms, feeling of obstruction, tachycardia, possible infection.  No nausea or vomiting.  No acute diarrhea.  No chest pain or shortness of breath.  Patient complaining of lower abdominal discomfort.  No neck or back pain.  No aggravating or alleviating factors otherwise noted.  No other acute injury or complaints.  Patient immunizations up-to-date.

## 2023-12-21 NOTE — PHARMACOTHERAPY INTERVENTION NOTE - COMMENTS
Medication reconciliation completed by pharmacy representative and reviewed. Discussed discrepancies with MD who will review and order medications per clinical judgement.

## 2023-12-21 NOTE — PROGRESS NOTE ADULT - SUBJECTIVE AND OBJECTIVE BOX
[facility] -- Brittany Lutz, Reynaldo Sweeney Savella, Goodger, Cohen: Office # [phone]    Follow Up:  A fib     Subjective/Observations: Patient seen and examined. Patient awake, alert, resting in bed. No complaints of chest pain, dyspnea, palpitations or dizziness. No signs of orthopnea or PND. Tolerating O2 via nasal cannula. Continuing on Cardizem gtt @ 10, Rates in 80-110s    REVIEW OF SYSTEMS: All other review of systems are negative unless indicated above    PAST MEDICAL & SURGICAL HISTORY:  Diabetes      Diabetes mellitus with no complication      Afib      Hypertension      Hypertension      BPH (benign prostatic hyperplasia)      Perforated gastric ulcer  s/p emergent ex-lap omentopexy and plication 6/2019      Pulmonary embolism      History of non-ST elevation myocardial infarction (NSTEMI)      Osteomyelitis  s/p debridement      CAD S/P percutaneous coronary angioplasty      Cerebrovascular accident      H/O abdominal surgery      Perforated gastric ulcer      Traumatic amputation of left foot, initial encounter          MEDICATIONS  (STANDING):  apixaban 5 milliGRAM(s) Oral every 12 hours  aspirin  chewable 81 milliGRAM(s) Oral daily  atorvastatin 40 milliGRAM(s) Oral at bedtime  bethanechol 25 milliGRAM(s) Oral three times a day  cefTRIAXone   IVPB 2000 milliGRAM(s) IV Intermittent every 24 hours  dextrose 50% Injectable 25 Gram(s) IV Push once  dextrose 50% Injectable 12.5 Gram(s) IV Push once  dextrose 50% Injectable 25 Gram(s) IV Push once  dextrose Oral Gel 15 Gram(s) Oral once  diltiazem Infusion 10 mG/Hr (10 mL/Hr) IV Continuous <Continuous>  famotidine    Tablet 20 milliGRAM(s) Oral daily  gabapentin 100 milliGRAM(s) Oral at bedtime  glucagon  Injectable 1 milliGRAM(s) IntraMuscular once  insulin glargine Injectable (LANTUS) 10 Unit(s) SubCutaneous at bedtime  insulin lispro (ADMELOG) corrective regimen sliding scale   SubCutaneous three times a day before meals  insulin lispro (ADMELOG) corrective regimen sliding scale   SubCutaneous at bedtime  isosorbide   mononitrate ER Tablet (IMDUR) 30 milliGRAM(s) Oral daily  losartan 25 milliGRAM(s) Oral daily  methimazole 10 milliGRAM(s) Oral daily  metoprolol tartrate 25 milliGRAM(s) Oral two times a day  pantoprazole    Tablet 40 milliGRAM(s) Oral before breakfast  saccharomyces boulardii 250 milliGRAM(s) Oral two times a day  sodium chloride 0.9%. 1000 milliLiter(s) (75 mL/Hr) IV Continuous <Continuous>  sucralfate 1 Gram(s) Oral four times a day  tamsulosin 0.8 milliGRAM(s) Oral at bedtime    MEDICATIONS  (PRN):  acetaminophen     Tablet .. 650 milliGRAM(s) Oral every 6 hours PRN Temp greater or equal to 38C (100.4F), Mild Pain (1 - 3)  magnesium hydroxide Suspension 30 milliLiter(s) Oral at bedtime PRN Constipation    Allergies    No Known Drug Allergies  fish (Hives)    Intolerances      Vital Signs Last 24 Hrs  T(C): 36.9 (21 Dec 2023 05:10), Max: 38.7 (21 Dec 2023 03:25)  T(F): 98.5 (21 Dec 2023 05:10), Max: 101.7 (21 Dec 2023 03:25)  HR: 93 (21 Dec 2023 05:10) (92 - 140)  BP: 112/70 (21 Dec 2023 05:10) (96/64 - 125/70)  BP(mean): --  RR: 19 (21 Dec 2023 05:10) (17 - 27)  SpO2: 94% (21 Dec 2023 05:10) (92% - 98%)    Parameters below as of 21 Dec 2023 05:10  Patient On (Oxygen Delivery Method): nasal cannula  O2 Flow (L/min): 2    I&O's Summary    20 Dec 2023 07:01  -  21 Dec 2023 07:00  --------------------------------------------------------  IN: 85 mL / OUT: 1100 mL / NET: -1015 mL    21 Dec 2023 07:01  -  21 Dec 2023 10:11  --------------------------------------------------------  IN: 85 mL / OUT: 0 mL / NET: 85 mL          TELE: A fib 80-110s   PHYSICAL EXAM:  Constitutional: NAD, awake and alert  HEENT: Moist Mucous Membranes, Anicteric  Pulmonary: Non-labored, breath sounds are clear bilaterally, No wheezing, rales or rhonchi  Cardiovascular: Regular, S1 and S2, No murmurs, No rubs, gallops or clicks  Gastrointestinal:  soft, nontender, nondistended   Lymph: No peripheral edema. No lymphadenopathy.   Skin: No visible rashes or ulcers.  Psych:  Mood & affect appropriate      LABS: All Labs Reviewed:                        11.6   12.40 )-----------( 142      ( 21 Dec 2023 04:45 )             36.1                         13.3   6.32  )-----------( 187      ( 20 Dec 2023 08:20 )             39.7     21 Dec 2023 04:45    139    |  107    |  24     ----------------------------<  149    4.4     |  27     |  1.40   20 Dec 2023 08:20    138    |  106    |  16     ----------------------------<  158    4.0     |  26     |  1.10     Ca    7.9        21 Dec 2023 04:45  Ca    9.1        20 Dec 2023 08:20    TPro  7.5    /  Alb  3.3    /  TBili  0.7    /  DBili  x      /  AST  17     /  ALT  21     /  AlkPhos  138    20 Dec 2023 08:20   LIVER FUNCTIONS - ( 20 Dec 2023 08:20 )  Alb: 3.3 g/dL / Pro: 7.5 g/dL / ALK PHOS: 138 U/L / ALT: 21 U/L / AST: 17 U/L / GGT: x           PT/INR - ( 20 Dec 2023 08:20 )   PT: 13.1 sec;   INR: 1.12 ratio         PTT - ( 20 Dec 2023 08:20 )  PTT:28.2 secCreatine Kinase, Serum: 78 U/L (12-20-23 @ 08:20)  Troponin I, High Sensitivity Result: 98.2 ng/L (12-20-23 @ 08:20)  Lactate, Blood: 1.9 mmol/L (12-20-23 @ 10:50)  Lactate, Blood: 2.3 mmol/L (12-20-23 @ 08:20)    12 Lead ECG:   Ventricular Rate 164 BPM    QRS Duration 78 ms    Q-T Interval 232 ms    QTC Calculation(Bazett) 383 ms    R Axis 3 degrees    T Axis 60 degrees    Diagnosis Line *** Critical Test Result: High HR  Atrial fibrillation with rapid ventricular response  Low voltage QRS  Abnormal ECG  When compared with ECG of 16-JUN-2021 08:29,  Atrial fibrillation has replaced Sinus rhythm  Vent. rate has increased BY  97 BPM  Nonspecific T wave abnormality now evident in Lateral leads  Confirmed by Jovanni Soto MD ([id number]) on 12/20/2023 2:27:15 PM (12-20-23 @ 08:11)       EXAM:  ECHO TTE WO CON COMP W DOPPLR         PROCEDURE DATE:  12/24/2019        INTERPRETATION:  INDICATION: Coronary artery disease    Blood Pressure 117/71    Height 187     Weight 93       BSA 2.2    Dimensions:    LA 4.2       Normal Values: 2.0 - 4.0 cm    Ao 4.0        Normal Values: 2.0 - 3.8 cm  SEPTUM 1.6       Normal Values: 0.6 - 1.2 cm  PWT 1.6       Normal Values: 0.6 - 1.1 cm  LVIDd 5.8         Normal Values: 3.0 - 5.6 cm  LVIDs 3.2         Normal Values: 1.8 - 4.0 cm    Derived Variables:  LVMI g/m2  RWT  Fractional Short  Ejection Fraction    Doppler Peak v. AoV= (m/sec)    OBSERVATIONS:    Mitral Valve: normal, mild MR.  Aortic Valve/Aorta: normal trileaflet aortic valve.  Tricuspid Valve: normal with trace TR.  Pulmonic Valve: normal  Left Atrium: Enlarged  Right Atrium: normal  Left Ventricle: Severe concentric LVH with normal systolic function, estimated LVEF of 65%.  Right Ventricle: normal size and systolic function.  Pericardium/Pleura: no significant pleural effusion, no significant pericardial effusion.  Pulmonary/RV Pressure: Inadequate TR jet to estimate PA systolic pressure  LV Diastolic Function: Grade 2diastolic dysfunction.    Severe concentric LVH, and mild LV enlargement, with normal systolic function, estimated LVEF of 65%. Normal right ventricular size and systolic function. Grade 2diastolic dysfunction. Left atrial enlargement The aortic root is normal in size. The mitral valve is structurally normal, mild MR. The aortic valve is trileaflet without stenosis or insufficiency. Trace physiologic TR. Inadequate TR jet to estimate PA systolic pressure No significant pericardial effusion.     JOVANNI SOTO M.D., ATTENDING CARDIOLOGIST  This document has been electronically signed. Dec 25 2019 11:45AM               [facility] -- Brittany Lutz, Reynaldo Sweeney Savella, Goodger, Cohen: Office # [phone]    Follow Up:  A fib     Subjective/Observations: Patient seen and examined. Patient awake, alert, resting in bed. No complaints of chest pain, dyspnea, palpitations or dizziness. No signs of orthopnea or PND. Tolerating O2 via nasal cannula. Continuing on Cardizem gtt @ 10, Rates in 80-110s    REVIEW OF SYSTEMS: All other review of systems are negative unless indicated above    PAST MEDICAL & SURGICAL HISTORY:  Diabetes      Diabetes mellitus with no complication      Afib      Hypertension      Hypertension      BPH (benign prostatic hyperplasia)      Perforated gastric ulcer  s/p emergent ex-lap omentopexy and plication 6/2019      Pulmonary embolism      History of non-ST elevation myocardial infarction (NSTEMI)      Osteomyelitis  s/p debridement      CAD S/P percutaneous coronary angioplasty      Cerebrovascular accident      H/O abdominal surgery      Perforated gastric ulcer      Traumatic amputation of left foot, initial encounter          MEDICATIONS  (STANDING):  apixaban 5 milliGRAM(s) Oral every 12 hours  aspirin  chewable 81 milliGRAM(s) Oral daily  atorvastatin 40 milliGRAM(s) Oral at bedtime  bethanechol 25 milliGRAM(s) Oral three times a day  cefTRIAXone   IVPB 2000 milliGRAM(s) IV Intermittent every 24 hours  dextrose 50% Injectable 25 Gram(s) IV Push once  dextrose 50% Injectable 12.5 Gram(s) IV Push once  dextrose 50% Injectable 25 Gram(s) IV Push once  dextrose Oral Gel 15 Gram(s) Oral once  diltiazem Infusion 10 mG/Hr (10 mL/Hr) IV Continuous <Continuous>  famotidine    Tablet 20 milliGRAM(s) Oral daily  gabapentin 100 milliGRAM(s) Oral at bedtime  glucagon  Injectable 1 milliGRAM(s) IntraMuscular once  insulin glargine Injectable (LANTUS) 10 Unit(s) SubCutaneous at bedtime  insulin lispro (ADMELOG) corrective regimen sliding scale   SubCutaneous three times a day before meals  insulin lispro (ADMELOG) corrective regimen sliding scale   SubCutaneous at bedtime  isosorbide   mononitrate ER Tablet (IMDUR) 30 milliGRAM(s) Oral daily  losartan 25 milliGRAM(s) Oral daily  methimazole 10 milliGRAM(s) Oral daily  metoprolol tartrate 25 milliGRAM(s) Oral two times a day  pantoprazole    Tablet 40 milliGRAM(s) Oral before breakfast  saccharomyces boulardii 250 milliGRAM(s) Oral two times a day  sodium chloride 0.9%. 1000 milliLiter(s) (75 mL/Hr) IV Continuous <Continuous>  sucralfate 1 Gram(s) Oral four times a day  tamsulosin 0.8 milliGRAM(s) Oral at bedtime    MEDICATIONS  (PRN):  acetaminophen     Tablet .. 650 milliGRAM(s) Oral every 6 hours PRN Temp greater or equal to 38C (100.4F), Mild Pain (1 - 3)  magnesium hydroxide Suspension 30 milliLiter(s) Oral at bedtime PRN Constipation    Allergies    No Known Drug Allergies  fish (Hives)    Intolerances      Vital Signs Last 24 Hrs  T(C): 36.9 (21 Dec 2023 05:10), Max: 38.7 (21 Dec 2023 03:25)  T(F): 98.5 (21 Dec 2023 05:10), Max: 101.7 (21 Dec 2023 03:25)  HR: 93 (21 Dec 2023 05:10) (92 - 140)  BP: 112/70 (21 Dec 2023 05:10) (96/64 - 125/70)  BP(mean): --  RR: 19 (21 Dec 2023 05:10) (17 - 27)  SpO2: 94% (21 Dec 2023 05:10) (92% - 98%)    Parameters below as of 21 Dec 2023 05:10  Patient On (Oxygen Delivery Method): nasal cannula  O2 Flow (L/min): 2    I&O's Summary    20 Dec 2023 07:01  -  21 Dec 2023 07:00  --------------------------------------------------------  IN: 85 mL / OUT: 1100 mL / NET: -1015 mL    21 Dec 2023 07:01  -  21 Dec 2023 10:11  --------------------------------------------------------  IN: 85 mL / OUT: 0 mL / NET: 85 mL          TELE: A fib 80-110s   PHYSICAL EXAM:  Constitutional: NAD, awake and alert  HEENT: Moist Mucous Membranes, Anicteric  Pulmonary: Non-labored, breath sounds are clear bilaterally, No wheezing, rales or rhonchi  Cardiovascular: Regular, S1 and S2, No murmurs, No rubs, gallops or clicks  Gastrointestinal:  soft, nontender, nondistended   Lymph: No peripheral edema. No lymphadenopathy.   Skin: No visible rashes or ulcers.  Psych:  Mood & affect appropriate      LABS: All Labs Reviewed:                        11.6   12.40 )-----------( 142      ( 21 Dec 2023 04:45 )             36.1                         13.3   6.32  )-----------( 187      ( 20 Dec 2023 08:20 )             39.7     21 Dec 2023 04:45    139    |  107    |  24     ----------------------------<  149    4.4     |  27     |  1.40   20 Dec 2023 08:20    138    |  106    |  16     ----------------------------<  158    4.0     |  26     |  1.10     Ca    7.9        21 Dec 2023 04:45  Ca    9.1        20 Dec 2023 08:20    TPro  7.5    /  Alb  3.3    /  TBili  0.7    /  DBili  x      /  AST  17     /  ALT  21     /  AlkPhos  138    20 Dec 2023 08:20   LIVER FUNCTIONS - ( 20 Dec 2023 08:20 )  Alb: 3.3 g/dL / Pro: 7.5 g/dL / ALK PHOS: 138 U/L / ALT: 21 U/L / AST: 17 U/L / GGT: x           PT/INR - ( 20 Dec 2023 08:20 )   PT: 13.1 sec;   INR: 1.12 ratio         PTT - ( 20 Dec 2023 08:20 )  PTT:28.2 secCreatine Kinase, Serum: 78 U/L (12-20-23 @ 08:20)  Troponin I, High Sensitivity Result: 98.2 ng/L (12-20-23 @ 08:20)  Lactate, Blood: 1.9 mmol/L (12-20-23 @ 10:50)  Lactate, Blood: 2.3 mmol/L (12-20-23 @ 08:20)    12 Lead ECG:   Ventricular Rate 164 BPM    QRS Duration 78 ms    Q-T Interval 232 ms    QTC Calculation(Bazett) 383 ms    R Axis 3 degrees    T Axis 60 degrees    Diagnosis Line *** Critical Test Result: High HR  Atrial fibrillation with rapid ventricular response  Low voltage QRS  Abnormal ECG  When compared with ECG of 16-JUN-2021 08:29,  Atrial fibrillation has replaced Sinus rhythm  Vent. rate has increased BY  97 BPM  Nonspecific T wave abnormality now evident in Lateral leads  Confirmed by Jovanni Soto MD ([id number]) on 12/20/2023 2:27:15 PM (12-20-23 @ 08:11)       EXAM:  ECHO TTE WO CON COMP W DOPPLR         PROCEDURE DATE:  12/24/2019        INTERPRETATION:  INDICATION: Coronary artery disease    Blood Pressure 117/71    Height 187     Weight 93       BSA 2.2    Dimensions:    LA 4.2       Normal Values: 2.0 - 4.0 cm    Ao 4.0        Normal Values: 2.0 - 3.8 cm  SEPTUM 1.6       Normal Values: 0.6 - 1.2 cm  PWT 1.6       Normal Values: 0.6 - 1.1 cm  LVIDd 5.8         Normal Values: 3.0 - 5.6 cm  LVIDs 3.2         Normal Values: 1.8 - 4.0 cm    Derived Variables:  LVMI g/m2  RWT  Fractional Short  Ejection Fraction    Doppler Peak v. AoV= (m/sec)    OBSERVATIONS:    Mitral Valve: normal, mild MR.  Aortic Valve/Aorta: normal trileaflet aortic valve.  Tricuspid Valve: normal with trace TR.  Pulmonic Valve: normal  Left Atrium: Enlarged  Right Atrium: normal  Left Ventricle: Severe concentric LVH with normal systolic function, estimated LVEF of 65%.  Right Ventricle: normal size and systolic function.  Pericardium/Pleura: no significant pleural effusion, no significant pericardial effusion.  Pulmonary/RV Pressure: Inadequate TR jet to estimate PA systolic pressure  LV Diastolic Function: Grade 2diastolic dysfunction.    Severe concentric LVH, and mild LV enlargement, with normal systolic function, estimated LVEF of 65%. Normal right ventricular size and systolic function. Grade 2diastolic dysfunction. Left atrial enlargement The aortic root is normal in size. The mitral valve is structurally normal, mild MR. The aortic valve is trileaflet without stenosis or insufficiency. Trace physiologic TR. Inadequate TR jet to estimate PA systolic pressure No significant pericardial effusion.     JOVANNI SOTO M.D., ATTENDING CARDIOLOGIST  This document has been electronically signed. Dec 25 2019 11:45AM

## 2023-12-21 NOTE — PATIENT PROFILE ADULT - NSTRANSFERBELONGINGSDISPO_GEN_A_NUR
Bedside report from EMELI Burris  Patient resting comfortably, resp even and unlabored, NAD, VSS.     BP (!) 172/68   Pulse 73   Temp 36.4 °C (97.5 °F) (Temporal)   Resp 20   Ht 1.829 m (6')   Wt 68 kg (150 lb)   SpO2 95%     not applicable

## 2023-12-21 NOTE — PROGRESS NOTE ADULT - ASSESSMENT
53-year-old male with a history of a CVA, chronic PE, atrial fibrillation, hypertension, BPH, type 2 diabetes, chronic Aguilera, hyperlipidemia, GERD, CAD, peripheral vascular disease, hypothyroid presents with brought in by EMS from SNF for generalized weakness, urinary symptoms, feeling of obstruction, tachycardia, RT CVA tenderness, bandemia  CT-Bilateral perinephric fat stranding, greater on the left which is a   nonspecific finding, however may reflect bilateral ascending urinary   tract infections.    RECOMMENDATIONS  1-Pyelonephritis/Klebsiella Bacteremia compelling with consistent history, physical exam, imaging and lab  -changed to ceftriaxone (started 12/20)-continue for now  Culture - Blood (12.20.23 @ 08:15)    -  K. pneumoniae group: Detec (K. pneumoniae, K. quasipneumoniae, K. variicola)  12/21-rpt blood cultures ordered      Thank you for consulting us and involving us in the management of this most interesting and challenging case.  We will follow along in the care of this patient. Please call us at [phone] or text me directly on my cell# at [phone] with any concerns.   53-year-old male with a history of a CVA, chronic PE, atrial fibrillation, hypertension, BPH, type 2 diabetes, chronic Aguilera, hyperlipidemia, GERD, CAD, peripheral vascular disease, hypothyroid presents with brought in by EMS from SNF for generalized weakness, urinary symptoms, feeling of obstruction, tachycardia, RT CVA tenderness, bandemia  CT-Bilateral perinephric fat stranding, greater on the left which is a   nonspecific finding, however may reflect bilateral ascending urinary   tract infections.    RECOMMENDATIONS  1-Pyelonephritis/Klebsiella Bacteremia compelling with consistent history, physical exam, imaging and lab  -changed to ceftriaxone (started 12/20)-continue for now  Culture - Blood (12.20.23 @ 08:15)    -  K. pneumoniae group: Detec (K. pneumoniae, K. quasipneumoniae, K. variicola)  12/21-rpt blood cultures ordered      Thank you for consulting us and involving us in the management of this most interesting and challenging case.  We will follow along in the care of this patient. Please call us at [phone] or text me directly on my cell# at [phone] with any concerns.

## 2023-12-21 NOTE — PATIENT PROFILE ADULT - NSPROMEDSBROUGHTTOHOSP_GEN_A_NUR
no
[FreeTextEntry1] : Patient is a 52 year old man with a history of hyperlipidemia, PFO, elevated LFTs, renal insufficiency, H. pylori, and former tobacco use who presents today for followup of hyperlipidemia. He states that he has experienced episodes of constipation, but otherwise denies any chest pain, dyspnea, palpitations, headaches, and dizziness. He states that he started taking his Pravastatin about 2 weeks ago.

## 2023-12-21 NOTE — CARE COORDINATION ASSESSMENT. - NSDCPLANSERVICES_GEN_ALL_CORE
Anticipate return to [facility] for long term care upon discharge./Same as Prior Admission/Skilled Nursing Facility-Long Term Anticipate return to [facility] for long term care upon discharge./Same as Prior Admission/Skilled Nursing Facility-Long Term

## 2023-12-21 NOTE — CARE COORDINATION ASSESSMENT. - OTHER PERTINENT DISCHARGE PLANNING INFORMATION:
Discussed at daily rounds. Patient is from [facility]. He appears alert and oriented. States he has been at [facility] for 3 years. States he was walking with walker independently pta. Educated him re. role of social work. Provided d/c planning folder including social work name & number. Contacted admissions at [facility]. Confirmed he is a long term resident and can return upon d/c. They will need auth or denial from [facility] upon discharge. Social work to follow.  Discussed at daily rounds. Patient is from [facility]. He appears alert and oriented. States he has been at [facility] for 3 years. States he was walking with walker independently pta. Educated him re. role of social work. Provided d/c planning folder including social work name & number. Contacted admissions at [facility]. Confirmed he is a long term resident and can return upon d/c. They will need auth or denial from [facility] upon discharge. Social work to follow.

## 2023-12-21 NOTE — CARE COORDINATION ASSESSMENT. - PRO ARRIVE FROM
[facility] Nursing [facility]/other (specify) [facility]/other (specify)

## 2023-12-21 NOTE — PROGRESS NOTE ADULT - ASSESSMENT
54yo M with a PMH of CVA, chronic PE (on Eliquis), atrial fibrillation, hypertension, BPH, type 2 diabetes, chronic Aguilera, hyperlipidemia, GERD, CAD (s/p stent to RCA), peripheral vascular disease, hypothyroid who presents to the ED for SOB, chills and lethargy, found to be in rapid Afib w/ RVR. Patient states he used to follow with Cardio Dr. [place], but has not followed up with Cardio in many years.     A fib RVR, chr PE, HTN, HLD, CAD, stents, PVD  - Hx of PAF but no clear documentation on whether the patient has had recent episodes of Afib  - Previously on Amiodarone, but d/c due to patient having negative side effects affecting his thyroid   - EKbpm, Afib w/ RVR  - S/p IV Cardizem 10mg x1  - Continue Cardizem gtt, would up titrate to 15 mg/hr  - Tele w/ A fib 80-110s   - Patient is already on Eliquis for chronic PE --> continue Eliquis for AC   - Continue Metoprolol tartrate 50mg BID and titrate up if needed   - Repeat EKG when patients HR stabilizes     - Hx of CAD s/p 1 stent to RCA   - EKbpm, Afib w/ RVR  - Troponin: <-98.2 likely elevated in the setting of demand ischemia  - Hold ASA, as patient is medically frail and no need to increase bleeding risk   - Continue statin   - Repeat EKG when patients HR stabilizes     - Pro bnp [id number]  - TTE (2019): EF 65%, mild MR, concentric LVH, grade II diastolic dysfunction    - Continue home Lasix 40mg PO daily   - Creatinine:  <--1.40,  <--1.10  - Continue Fluid restriction  - Repeat TTE   - Strict I/Os, daily weights    - BP stable and controlled  - Hold home Losartan and Imdur to allow for AV yung agent titration     - Monitor and replete lytes, keep K>4, Mg>2.  - Will continue to follow.    Danny Arce, MS FNP, AGACNP  Nurse Practitioner- Cardiology   Please call on TEAMS

## 2023-12-21 NOTE — PATIENT PROFILE ADULT - FALL HARM RISK - HARM RISK INTERVENTIONS
Assistance with ambulation/Assistance OOB with selected safe patient handling equipment/Communicate Risk of Fall with Harm to all staff/Discuss with provider need for PT consult/Monitor gait and stability/Provide patient with walking aids - walker, cane, crutches/Reinforce activity limits and safety measures with patient and family/Tailored Fall Risk Interventions/Visual Cue: Yellow wristband and red socks/Bed in lowest position, wheels locked, appropriate side rails in place/Call bell, personal items and telephone in reach/Instruct patient to call for assistance before getting out of bed or chair/Non-slip footwear when patient is out of bed/[place] to call system/Physically safe environment - no spills, clutter or unnecessary equipment/Purposeful Proactive Rounding/Room/bathroom lighting operational, light cord in reach Assistance with ambulation/Assistance OOB with selected safe patient handling equipment/Communicate Risk of Fall with Harm to all staff/Discuss with provider need for PT consult/Monitor gait and stability/Provide patient with walking aids - walker, cane, crutches/Reinforce activity limits and safety measures with patient and family/Tailored Fall Risk Interventions/Visual Cue: Yellow wristband and red socks/Bed in lowest position, wheels locked, appropriate side rails in place/Call bell, personal items and telephone in reach/Instruct patient to call for assistance before getting out of bed or chair/Non-slip footwear when patient is out of bed/[place] to call system/Physically safe environment - no spills, clutter or unnecessary equipment/Purposeful Proactive Rounding/Room/bathroom lighting operational, light cord in reach

## 2023-12-22 LAB
-  AMPICILLIN/SULBACTAM: SIGNIFICANT CHANGE UP
-  AMPICILLIN/SULBACTAM: SIGNIFICANT CHANGE UP
-  AMPICILLIN: SIGNIFICANT CHANGE UP
-  AMPICILLIN: SIGNIFICANT CHANGE UP
-  AZTREONAM: SIGNIFICANT CHANGE UP
-  AZTREONAM: SIGNIFICANT CHANGE UP
-  CEFAZOLIN: SIGNIFICANT CHANGE UP
-  CEFAZOLIN: SIGNIFICANT CHANGE UP
-  CEFEPIME: SIGNIFICANT CHANGE UP
-  CEFEPIME: SIGNIFICANT CHANGE UP
-  CEFOXITIN: SIGNIFICANT CHANGE UP
-  CEFOXITIN: SIGNIFICANT CHANGE UP
-  CEFTRIAXONE: SIGNIFICANT CHANGE UP
-  CEFTRIAXONE: SIGNIFICANT CHANGE UP
-  CIPROFLOXACIN: SIGNIFICANT CHANGE UP
-  CIPROFLOXACIN: SIGNIFICANT CHANGE UP
-  ERTAPENEM: SIGNIFICANT CHANGE UP
-  ERTAPENEM: SIGNIFICANT CHANGE UP
-  GENTAMICIN: SIGNIFICANT CHANGE UP
-  GENTAMICIN: SIGNIFICANT CHANGE UP
-  IMIPENEM: SIGNIFICANT CHANGE UP
-  IMIPENEM: SIGNIFICANT CHANGE UP
-  LEVOFLOXACIN: SIGNIFICANT CHANGE UP
-  LEVOFLOXACIN: SIGNIFICANT CHANGE UP
-  MEROPENEM: SIGNIFICANT CHANGE UP
-  MEROPENEM: SIGNIFICANT CHANGE UP
-  PIPERACILLIN/TAZOBACTAM: SIGNIFICANT CHANGE UP
-  PIPERACILLIN/TAZOBACTAM: SIGNIFICANT CHANGE UP
-  TOBRAMYCIN: SIGNIFICANT CHANGE UP
-  TOBRAMYCIN: SIGNIFICANT CHANGE UP
-  TRIMETHOPRIM/SULFAMETHOXAZOLE: SIGNIFICANT CHANGE UP
-  TRIMETHOPRIM/SULFAMETHOXAZOLE: SIGNIFICANT CHANGE UP
ALBUMIN SERPL ELPH-MCNC: 2.4 G/DL — LOW (ref 3.3–5)
ALBUMIN SERPL ELPH-MCNC: 2.4 G/DL — LOW (ref 3.3–5)
ALP SERPL-CCNC: 92 U/L — SIGNIFICANT CHANGE UP (ref 40–120)
ALP SERPL-CCNC: 92 U/L — SIGNIFICANT CHANGE UP (ref 40–120)
ALT FLD-CCNC: 24 U/L — SIGNIFICANT CHANGE UP (ref 12–78)
ALT FLD-CCNC: 24 U/L — SIGNIFICANT CHANGE UP (ref 12–78)
ANION GAP SERPL CALC-SCNC: 6 MMOL/L — SIGNIFICANT CHANGE UP (ref 5–17)
ANION GAP SERPL CALC-SCNC: 6 MMOL/L — SIGNIFICANT CHANGE UP (ref 5–17)
AST SERPL-CCNC: 38 U/L — HIGH (ref 15–37)
AST SERPL-CCNC: 38 U/L — HIGH (ref 15–37)
BASE EXCESS BLDV CALC-SCNC: 0.3 MMOL/L — SIGNIFICANT CHANGE UP (ref -2–3)
BASE EXCESS BLDV CALC-SCNC: 0.3 MMOL/L — SIGNIFICANT CHANGE UP (ref -2–3)
BILIRUB SERPL-MCNC: 0.4 MG/DL — SIGNIFICANT CHANGE UP (ref 0.2–1.2)
BILIRUB SERPL-MCNC: 0.4 MG/DL — SIGNIFICANT CHANGE UP (ref 0.2–1.2)
BLOOD GAS COMMENTS, VENOUS: SIGNIFICANT CHANGE UP
BLOOD GAS COMMENTS, VENOUS: SIGNIFICANT CHANGE UP
BUN SERPL-MCNC: 20 MG/DL — SIGNIFICANT CHANGE UP (ref 7–23)
BUN SERPL-MCNC: 20 MG/DL — SIGNIFICANT CHANGE UP (ref 7–23)
CALCIUM SERPL-MCNC: 7.8 MG/DL — LOW (ref 8.5–10.1)
CALCIUM SERPL-MCNC: 7.8 MG/DL — LOW (ref 8.5–10.1)
CHLORIDE SERPL-SCNC: 110 MMOL/L — HIGH (ref 96–108)
CHLORIDE SERPL-SCNC: 110 MMOL/L — HIGH (ref 96–108)
CO2 SERPL-SCNC: 24 MMOL/L — SIGNIFICANT CHANGE UP (ref 22–31)
CO2 SERPL-SCNC: 24 MMOL/L — SIGNIFICANT CHANGE UP (ref 22–31)
CREAT SERPL-MCNC: 0.99 MG/DL — SIGNIFICANT CHANGE UP (ref 0.5–1.3)
CREAT SERPL-MCNC: 0.99 MG/DL — SIGNIFICANT CHANGE UP (ref 0.5–1.3)
CULTURE RESULTS: ABNORMAL
EGFR: 90 ML/MIN/1.73M2 — SIGNIFICANT CHANGE UP
EGFR: 90 ML/MIN/1.73M2 — SIGNIFICANT CHANGE UP
GAS PNL BLDV: SIGNIFICANT CHANGE UP
GAS PNL BLDV: SIGNIFICANT CHANGE UP
GLUCOSE BLDC GLUCOMTR-MCNC: 173 MG/DL — HIGH (ref 70–99)
GLUCOSE BLDC GLUCOMTR-MCNC: 173 MG/DL — HIGH (ref 70–99)
GLUCOSE BLDC GLUCOMTR-MCNC: 237 MG/DL — HIGH (ref 70–99)
GLUCOSE BLDC GLUCOMTR-MCNC: 237 MG/DL — HIGH (ref 70–99)
GLUCOSE BLDC GLUCOMTR-MCNC: 243 MG/DL — HIGH (ref 70–99)
GLUCOSE BLDC GLUCOMTR-MCNC: 243 MG/DL — HIGH (ref 70–99)
GLUCOSE BLDC GLUCOMTR-MCNC: 247 MG/DL — HIGH (ref 70–99)
GLUCOSE BLDC GLUCOMTR-MCNC: 247 MG/DL — HIGH (ref 70–99)
GLUCOSE SERPL-MCNC: 189 MG/DL — HIGH (ref 70–99)
GLUCOSE SERPL-MCNC: 189 MG/DL — HIGH (ref 70–99)
HCO3 BLDV-SCNC: 25 MMOL/L — SIGNIFICANT CHANGE UP (ref 22–29)
HCO3 BLDV-SCNC: 25 MMOL/L — SIGNIFICANT CHANGE UP (ref 22–29)
HCT VFR BLD CALC: 33 % — LOW (ref 39–50)
HCT VFR BLD CALC: 33 % — LOW (ref 39–50)
HGB BLD-MCNC: 10.7 G/DL — LOW (ref 13–17)
HGB BLD-MCNC: 10.7 G/DL — LOW (ref 13–17)
MCHC RBC-ENTMCNC: 30.8 PG — SIGNIFICANT CHANGE UP (ref 27–34)
MCHC RBC-ENTMCNC: 30.8 PG — SIGNIFICANT CHANGE UP (ref 27–34)
MCHC RBC-ENTMCNC: 32.4 GM/DL — SIGNIFICANT CHANGE UP (ref 32–36)
MCHC RBC-ENTMCNC: 32.4 GM/DL — SIGNIFICANT CHANGE UP (ref 32–36)
MCV RBC AUTO: 95.1 FL — SIGNIFICANT CHANGE UP (ref 80–100)
MCV RBC AUTO: 95.1 FL — SIGNIFICANT CHANGE UP (ref 80–100)
METHOD TYPE: SIGNIFICANT CHANGE UP
METHOD TYPE: SIGNIFICANT CHANGE UP
NRBC # BLD: 0 /100 WBCS — SIGNIFICANT CHANGE UP (ref 0–0)
NRBC # BLD: 0 /100 WBCS — SIGNIFICANT CHANGE UP (ref 0–0)
ORGANISM # SPEC MICROSCOPIC CNT: ABNORMAL
ORGANISM # SPEC MICROSCOPIC CNT: SIGNIFICANT CHANGE UP
PCO2 BLDV: 43 MMHG — SIGNIFICANT CHANGE UP (ref 42–55)
PCO2 BLDV: 43 MMHG — SIGNIFICANT CHANGE UP (ref 42–55)
PH BLDV: 7.38 — SIGNIFICANT CHANGE UP (ref 7.32–7.43)
PH BLDV: 7.38 — SIGNIFICANT CHANGE UP (ref 7.32–7.43)
PLATELET # BLD AUTO: 119 K/UL — LOW (ref 150–400)
PLATELET # BLD AUTO: 119 K/UL — LOW (ref 150–400)
PO2 BLDV: 92 MMHG — HIGH (ref 25–45)
PO2 BLDV: 92 MMHG — HIGH (ref 25–45)
POTASSIUM SERPL-MCNC: 3.8 MMOL/L — SIGNIFICANT CHANGE UP (ref 3.5–5.3)
POTASSIUM SERPL-MCNC: 3.8 MMOL/L — SIGNIFICANT CHANGE UP (ref 3.5–5.3)
POTASSIUM SERPL-SCNC: 3.8 MMOL/L — SIGNIFICANT CHANGE UP (ref 3.5–5.3)
POTASSIUM SERPL-SCNC: 3.8 MMOL/L — SIGNIFICANT CHANGE UP (ref 3.5–5.3)
PROT SERPL-MCNC: 6.3 G/DL — SIGNIFICANT CHANGE UP (ref 6–8.3)
PROT SERPL-MCNC: 6.3 G/DL — SIGNIFICANT CHANGE UP (ref 6–8.3)
RBC # BLD: 3.47 M/UL — LOW (ref 4.2–5.8)
RBC # BLD: 3.47 M/UL — LOW (ref 4.2–5.8)
RBC # FLD: 13.2 % — SIGNIFICANT CHANGE UP (ref 10.3–14.5)
RBC # FLD: 13.2 % — SIGNIFICANT CHANGE UP (ref 10.3–14.5)
SAO2 % BLDV: 97.6 % — HIGH (ref 67–88)
SAO2 % BLDV: 97.6 % — HIGH (ref 67–88)
SODIUM SERPL-SCNC: 140 MMOL/L — SIGNIFICANT CHANGE UP (ref 135–145)
SODIUM SERPL-SCNC: 140 MMOL/L — SIGNIFICANT CHANGE UP (ref 135–145)
SPECIMEN SOURCE: SIGNIFICANT CHANGE UP
WBC # BLD: 8.66 K/UL — SIGNIFICANT CHANGE UP (ref 3.8–10.5)
WBC # BLD: 8.66 K/UL — SIGNIFICANT CHANGE UP (ref 3.8–10.5)
WBC # FLD AUTO: 8.66 K/UL — SIGNIFICANT CHANGE UP (ref 3.8–10.5)
WBC # FLD AUTO: 8.66 K/UL — SIGNIFICANT CHANGE UP (ref 3.8–10.5)

## 2023-12-22 PROCEDURE — 99233 SBSQ HOSP IP/OBS HIGH 50: CPT

## 2023-12-22 RX ORDER — METOCLOPRAMIDE HCL 10 MG
5 TABLET ORAL THREE TIMES A DAY
Refills: 0 | Status: DISCONTINUED | OUTPATIENT
Start: 2023-12-22 | End: 2023-12-26

## 2023-12-22 RX ORDER — GABAPENTIN 400 MG/1
200 CAPSULE ORAL ONCE
Refills: 0 | Status: COMPLETED | OUTPATIENT
Start: 2023-12-22 | End: 2023-12-22

## 2023-12-22 RX ORDER — LANOLIN ALCOHOL/MO/W.PET/CERES
5 CREAM (GRAM) TOPICAL AT BEDTIME
Refills: 0 | Status: DISCONTINUED | OUTPATIENT
Start: 2023-12-22 | End: 2023-12-26

## 2023-12-22 RX ORDER — MULTIVIT-MIN/FERROUS GLUCONATE 9 MG/15 ML
1 LIQUID (ML) ORAL DAILY
Refills: 0 | Status: DISCONTINUED | OUTPATIENT
Start: 2023-12-22 | End: 2023-12-26

## 2023-12-22 RX ORDER — POLYETHYLENE GLYCOL 3350 17 G/17G
17 POWDER, FOR SOLUTION ORAL
Refills: 0 | Status: DISCONTINUED | OUTPATIENT
Start: 2023-12-22 | End: 2023-12-26

## 2023-12-22 RX ORDER — OXYCODONE HYDROCHLORIDE 5 MG/1
10 TABLET ORAL ONCE
Refills: 0 | Status: DISCONTINUED | OUTPATIENT
Start: 2023-12-22 | End: 2023-12-22

## 2023-12-22 RX ORDER — OXYBUTYNIN CHLORIDE 5 MG
10 TABLET ORAL DAILY
Refills: 0 | Status: DISCONTINUED | OUTPATIENT
Start: 2023-12-22 | End: 2023-12-22

## 2023-12-22 RX ORDER — GABAPENTIN 400 MG/1
300 CAPSULE ORAL
Refills: 0 | Status: DISCONTINUED | OUTPATIENT
Start: 2023-12-22 | End: 2023-12-26

## 2023-12-22 RX ORDER — SODIUM CHLORIDE 0.65 %
1 AEROSOL, SPRAY (ML) NASAL THREE TIMES A DAY
Refills: 0 | Status: DISCONTINUED | OUTPATIENT
Start: 2023-12-22 | End: 2023-12-26

## 2023-12-22 RX ORDER — METOPROLOL TARTRATE 50 MG
50 TABLET ORAL EVERY 8 HOURS
Refills: 0 | Status: DISCONTINUED | OUTPATIENT
Start: 2023-12-22 | End: 2023-12-26

## 2023-12-22 RX ORDER — LOSARTAN POTASSIUM 100 MG/1
100 TABLET, FILM COATED ORAL DAILY
Refills: 0 | Status: DISCONTINUED | OUTPATIENT
Start: 2023-12-22 | End: 2023-12-23

## 2023-12-22 RX ORDER — SENNA PLUS 8.6 MG/1
2 TABLET ORAL AT BEDTIME
Refills: 0 | Status: DISCONTINUED | OUTPATIENT
Start: 2023-12-22 | End: 2023-12-26

## 2023-12-22 RX ORDER — FUROSEMIDE 40 MG
40 TABLET ORAL
Refills: 0 | Status: DISCONTINUED | OUTPATIENT
Start: 2023-12-22 | End: 2023-12-23

## 2023-12-22 RX ORDER — GABAPENTIN 400 MG/1
300 CAPSULE ORAL DAILY
Refills: 0 | Status: DISCONTINUED | OUTPATIENT
Start: 2023-12-22 | End: 2023-12-22

## 2023-12-22 RX ORDER — ISOSORBIDE MONONITRATE 60 MG/1
60 TABLET, EXTENDED RELEASE ORAL DAILY
Refills: 0 | Status: DISCONTINUED | OUTPATIENT
Start: 2023-12-22 | End: 2023-12-23

## 2023-12-22 RX ORDER — CHOLECALCIFEROL (VITAMIN D3) 125 MCG
1000 CAPSULE ORAL DAILY
Refills: 0 | Status: DISCONTINUED | OUTPATIENT
Start: 2023-12-22 | End: 2023-12-26

## 2023-12-22 RX ORDER — POTASSIUM CHLORIDE 20 MEQ
10 PACKET (EA) ORAL DAILY
Refills: 0 | Status: DISCONTINUED | OUTPATIENT
Start: 2023-12-22 | End: 2023-12-26

## 2023-12-22 RX ORDER — OXYBUTYNIN CHLORIDE 5 MG
10 TABLET ORAL
Refills: 0 | Status: DISCONTINUED | OUTPATIENT
Start: 2023-12-22 | End: 2023-12-26

## 2023-12-22 RX ORDER — DILTIAZEM HCL 120 MG
60 CAPSULE, EXT RELEASE 24 HR ORAL EVERY 6 HOURS
Refills: 0 | Status: DISCONTINUED | OUTPATIENT
Start: 2023-12-22 | End: 2023-12-26

## 2023-12-22 RX ADMIN — GABAPENTIN 200 MILLIGRAM(S): 400 CAPSULE ORAL at 00:33

## 2023-12-22 RX ADMIN — Medication 40 MILLIGRAM(S): at 13:42

## 2023-12-22 RX ADMIN — Medication 50 MILLIGRAM(S): at 13:46

## 2023-12-22 RX ADMIN — Medication 1 GRAM(S): at 00:33

## 2023-12-22 RX ADMIN — CEFTRIAXONE 100 MILLIGRAM(S): 500 INJECTION, POWDER, FOR SOLUTION INTRAMUSCULAR; INTRAVENOUS at 13:46

## 2023-12-22 RX ADMIN — OXYCODONE HYDROCHLORIDE 10 MILLIGRAM(S): 5 TABLET ORAL at 00:33

## 2023-12-22 RX ADMIN — OXYCODONE HYDROCHLORIDE 10 MILLIGRAM(S): 5 TABLET ORAL at 01:03

## 2023-12-22 RX ADMIN — Medication 250 MILLIGRAM(S): at 06:50

## 2023-12-22 RX ADMIN — Medication 81 MILLIGRAM(S): at 12:22

## 2023-12-22 RX ADMIN — Medication 60 MILLIGRAM(S): at 23:28

## 2023-12-22 RX ADMIN — Medication 25 MILLIGRAM(S): at 00:33

## 2023-12-22 RX ADMIN — Medication 10 MILLIGRAM(S): at 17:45

## 2023-12-22 RX ADMIN — SODIUM CHLORIDE 75 MILLILITER(S): 9 INJECTION INTRAMUSCULAR; INTRAVENOUS; SUBCUTANEOUS at 17:45

## 2023-12-22 RX ADMIN — Medication 25 MILLIGRAM(S): at 06:50

## 2023-12-22 RX ADMIN — APIXABAN 5 MILLIGRAM(S): 2.5 TABLET, FILM COATED ORAL at 17:43

## 2023-12-22 RX ADMIN — Medication 25 MILLIGRAM(S): at 13:43

## 2023-12-22 RX ADMIN — APIXABAN 5 MILLIGRAM(S): 2.5 TABLET, FILM COATED ORAL at 06:51

## 2023-12-22 RX ADMIN — PANTOPRAZOLE SODIUM 40 MILLIGRAM(S): 20 TABLET, DELAYED RELEASE ORAL at 06:50

## 2023-12-22 RX ADMIN — Medication 2: at 08:28

## 2023-12-22 RX ADMIN — ATORVASTATIN CALCIUM 40 MILLIGRAM(S): 80 TABLET, FILM COATED ORAL at 22:45

## 2023-12-22 RX ADMIN — Medication 1 GRAM(S): at 06:50

## 2023-12-22 RX ADMIN — Medication 4: at 13:01

## 2023-12-22 RX ADMIN — Medication 250 MILLIGRAM(S): at 17:43

## 2023-12-22 RX ADMIN — ISOSORBIDE MONONITRATE 60 MILLIGRAM(S): 60 TABLET, EXTENDED RELEASE ORAL at 12:24

## 2023-12-22 RX ADMIN — Medication 60 MILLIGRAM(S): at 12:23

## 2023-12-22 RX ADMIN — FAMOTIDINE 20 MILLIGRAM(S): 10 INJECTION INTRAVENOUS at 12:24

## 2023-12-22 RX ADMIN — POLYETHYLENE GLYCOL 3350 17 GRAM(S): 17 POWDER, FOR SOLUTION ORAL at 17:43

## 2023-12-22 RX ADMIN — Medication 5 MILLIGRAM(S): at 22:56

## 2023-12-22 RX ADMIN — Medication 5 MILLIGRAM(S): at 13:43

## 2023-12-22 RX ADMIN — OXYCODONE HYDROCHLORIDE 10 MILLIGRAM(S): 5 TABLET ORAL at 23:58

## 2023-12-22 RX ADMIN — Medication 50 MILLIGRAM(S): at 22:45

## 2023-12-22 RX ADMIN — Medication 10 MILLIEQUIVALENT(S): at 12:23

## 2023-12-22 RX ADMIN — Medication 100 MILLIGRAM(S): at 13:43

## 2023-12-22 RX ADMIN — GABAPENTIN 300 MILLIGRAM(S): 400 CAPSULE ORAL at 17:42

## 2023-12-22 RX ADMIN — Medication 25 MILLIGRAM(S): at 22:49

## 2023-12-22 RX ADMIN — Medication 1000 UNIT(S): at 12:24

## 2023-12-22 RX ADMIN — TAMSULOSIN HYDROCHLORIDE 0.8 MILLIGRAM(S): 0.4 CAPSULE ORAL at 22:44

## 2023-12-22 RX ADMIN — Medication 1 GRAM(S): at 23:28

## 2023-12-22 RX ADMIN — Medication 4: at 17:41

## 2023-12-22 RX ADMIN — Medication 5 MILLIGRAM(S): at 22:45

## 2023-12-22 RX ADMIN — Medication 1 GRAM(S): at 17:43

## 2023-12-22 RX ADMIN — Medication 1 GRAM(S): at 12:24

## 2023-12-22 RX ADMIN — OXYCODONE HYDROCHLORIDE 10 MILLIGRAM(S): 5 TABLET ORAL at 23:28

## 2023-12-22 RX ADMIN — Medication 1 TABLET(S): at 12:23

## 2023-12-22 RX ADMIN — SENNA PLUS 2 TABLET(S): 8.6 TABLET ORAL at 22:44

## 2023-12-22 RX ADMIN — Medication 60 MILLIGRAM(S): at 17:42

## 2023-12-22 RX ADMIN — INSULIN GLARGINE 10 UNIT(S): 100 INJECTION, SOLUTION SUBCUTANEOUS at 22:47

## 2023-12-22 RX ADMIN — GABAPENTIN 300 MILLIGRAM(S): 400 CAPSULE ORAL at 23:28

## 2023-12-22 RX ADMIN — Medication 100 MILLIGRAM(S): at 22:45

## 2023-12-22 RX ADMIN — Medication 100 MILLIGRAM(S): at 06:50

## 2023-12-22 NOTE — PROGRESS NOTE ADULT - SUBJECTIVE AND OBJECTIVE BOX
CAPILLARY BLOOD GLUCOSE      POCT Blood Glucose.: 173 mg/dL (22 Dec 2023 08:16)  POCT Blood Glucose.: 177 mg/dL (21 Dec 2023 22:31)  POCT Blood Glucose.: 144 mg/dL (21 Dec 2023 17:25)  POCT Blood Glucose.: 181 mg/dL (21 Dec 2023 11:56)      Vital Signs Last 24 Hrs  T(C): 37 (22 Dec 2023 04:58), Max: 37.4 (21 Dec 2023 20:29)  T(F): 98.6 (22 Dec 2023 04:58), Max: 99.3 (21 Dec 2023 20:29)  HR: 90 (22 Dec 2023 04:58) (88 - 93)  BP: 106/72 (22 Dec 2023 04:58) (101/61 - 116/75)  BP(mean): --  RR: 18 (22 Dec 2023 04:58) (18 - 19)  SpO2: 90% (22 Dec 2023 04:58) (88% - 93%)    Parameters below as of 22 Dec 2023 04:58  Patient On (Oxygen Delivery Method): nasal cannula        General: WN/WD NAD  Respiratory: CTA B/L  CV: RRR, S1S2, no murmurs, rubs or gallops  Abdominal: Soft, NT, ND +BS, Last BM  Extremities: No edema, + peripheral pulses     12-22    140  |  110<H>  |  20  ----------------------------<  189<H>  3.8   |  24  |  0.99    Ca    7.8<L>      22 Dec 2023 07:08    TPro  6.3  /  Alb  2.4<L>  /  TBili  0.4  /  DBili  x   /  AST  38<H>  /  ALT  24  /  AlkPhos  92  12-22      atorvastatin 40 milliGRAM(s) Oral at bedtime  dextrose 50% Injectable 12.5 Gram(s) IV Push once  dextrose 50% Injectable 25 Gram(s) IV Push once  dextrose 50% Injectable 25 Gram(s) IV Push once  dextrose Oral Gel 15 Gram(s) Oral once  glucagon  Injectable 1 milliGRAM(s) IntraMuscular once  insulin glargine Injectable (LANTUS) 10 Unit(s) SubCutaneous at bedtime  insulin lispro (ADMELOG) corrective regimen sliding scale   SubCutaneous three times a day before meals  insulin lispro (ADMELOG) corrective regimen sliding scale   SubCutaneous at bedtime  methimazole 10 milliGRAM(s) Oral daily

## 2023-12-22 NOTE — OCCUPATIONAL THERAPY INITIAL EVALUATION ADULT - ADDITIONAL COMMENTS
Pt is a LTC resident at [facility]. Pt reports that he ambulated independently using a rolling walker and was independent in ADLs. Pt performed sit to stand with min assist and ambulated 5' alongside EOB using RW with CG x 1. Pt requires assistance with ADL's and transfers due to decreased strength, decreased endurance and impaired standing balance. Pt is a LTC resident at [facility]. Pt reports that he ambulated independently using a rolling walker and was independent in ADLs. Pt performed sit to stand with min assist and ambulated 5' alongside EOB using RW with CG x 1. Pt requires assistance with ADL's and transfers due to decreased strength, decreased endurance and impaired standing balance.

## 2023-12-22 NOTE — PROGRESS NOTE ADULT - SUBJECTIVE AND OBJECTIVE BOX
Date of Service 12-22-23 @ 15:15    Patient is a 53y old  Male who presents with a chief complaint of Sepsis     (22 Dec 2023 09:39)      INTERVAL /OVERNIGHT EVENTS: c/o bladder spasms    MEDICATIONS  (STANDING):  apixaban 5 milliGRAM(s) Oral every 12 hours  aspirin  chewable 81 milliGRAM(s) Oral daily  atorvastatin 40 milliGRAM(s) Oral at bedtime  bethanechol 25 milliGRAM(s) Oral three times a day  cefTRIAXone   IVPB 2000 milliGRAM(s) IV Intermittent every 24 hours  cholecalciferol 1000 Unit(s) Oral daily  dextrose 50% Injectable 12.5 Gram(s) IV Push once  dextrose 50% Injectable 25 Gram(s) IV Push once  dextrose 50% Injectable 25 Gram(s) IV Push once  dextrose Oral Gel 15 Gram(s) Oral once  diltiazem    Tablet 60 milliGRAM(s) Oral every 6 hours  famotidine    Tablet 20 milliGRAM(s) Oral daily  furosemide    Tablet 40 milliGRAM(s) Oral two times a day  gabapentin 300 milliGRAM(s) Oral daily  glucagon  Injectable 1 milliGRAM(s) IntraMuscular once  insulin glargine Injectable (LANTUS) 10 Unit(s) SubCutaneous at bedtime  insulin lispro (ADMELOG) corrective regimen sliding scale   SubCutaneous three times a day before meals  insulin lispro (ADMELOG) corrective regimen sliding scale   SubCutaneous at bedtime  isosorbide   mononitrate ER Tablet (IMDUR) 60 milliGRAM(s) Oral daily  losartan 100 milliGRAM(s) Oral daily  melatonin 5 milliGRAM(s) Oral at bedtime  methimazole 10 milliGRAM(s) Oral daily  metoclopramide 5 milliGRAM(s) Oral three times a day  metoprolol tartrate 50 milliGRAM(s) Oral every 8 hours  multivitamin/minerals 1 Tablet(s) Oral daily  oxybutynin 10 milliGRAM(s) Oral two times a day  pantoprazole    Tablet 40 milliGRAM(s) Oral before breakfast  phenazopyridine 100 milliGRAM(s) Oral every 8 hours  polyethylene glycol 3350 17 Gram(s) Oral two times a day  potassium chloride    Tablet ER 10 milliEquivalent(s) Oral daily  saccharomyces boulardii 250 milliGRAM(s) Oral two times a day  senna 2 Tablet(s) Oral at bedtime  sodium chloride 0.9%. 1000 milliLiter(s) (75 mL/Hr) IV Continuous <Continuous>  sucralfate 1 Gram(s) Oral four times a day  tamsulosin 0.8 milliGRAM(s) Oral at bedtime    MEDICATIONS  (PRN):  acetaminophen     Tablet .. 650 milliGRAM(s) Oral every 6 hours PRN Temp greater or equal to 38C (100.4F), Mild Pain (1 - 3)  bisacodyl 5 milliGRAM(s) Oral every 12 hours PRN Constipation  magnesium hydroxide Suspension 30 milliLiter(s) Oral at bedtime PRN Constipation  sodium chloride 0.65% Nasal 1 Spray(s) Both Nostrils three times a day PRN Nasal Congestion      Allergies    No Known Drug Allergies  fish (Hives)    Intolerances        REVIEW OF SYSTEMS:  bladder spasm    Vital Signs Last 24 Hrs  T(C): 36.7 (22 Dec 2023 12:07), Max: 37.4 (21 Dec 2023 20:29)  T(F): 98.1 (22 Dec 2023 12:07), Max: 99.3 (21 Dec 2023 20:29)  HR: 84 (22 Dec 2023 12:07) (84 - 93)  BP: 102/73 (22 Dec 2023 12:07) (101/61 - 106/72)  BP(mean): --  RR: 18 (22 Dec 2023 12:07) (18 - 19)  SpO2: 92% (22 Dec 2023 12:07) (90% - 93%)    Parameters below as of 22 Dec 2023 12:07  Patient On (Oxygen Delivery Method): nasal cannula  O2 Flow (L/min): 2      PHYSICAL EXAM:  GENERAL: NAD, well-groomed, well-developed  HEAD:  Atraumatic, Normocephalic  EYES: EOMI, PERRLA, conjunctiva and sclera clear  ENMT: No tonsillar erythema, exudates, or enlargement; Moist mucous membranes, Good dentition, No lesions  NECK: Supple, No JVD, Normal thyroid  NERVOUS SYSTEM:  Alert & Oriented X3, Good concentration; Motor Strength 5/5 B/L upper and lower extremities; DTRs 2+ intact and symmetric  CHEST/LUNG: Clear to auscultation bilaterally; No rales, rhonchi, wheezing, or rubs  HEART: Regular rate and rhythm; No murmurs, rubs, or gallops  ABDOMEN: Soft, Nontender, Nondistended; Bowel sounds present  EXTREMITIES:  2+ Peripheral Pulses, No clubbing, cyanosis, or edema  LYMPH: No lymphadenopathy noted  SKIN: No rashes or lesions    LABS:                        10.7   8.66  )-----------( 119      ( 22 Dec 2023 07:08 )             33.0     22 Dec 2023 07:08    140    |  110    |  20     ----------------------------<  189    3.8     |  24     |  0.99     Ca    7.8        22 Dec 2023 07:08    TPro  6.3    /  Alb  2.4    /  TBili  0.4    /  DBili  x      /  AST  38     /  ALT  24     /  AlkPhos  92     22 Dec 2023 07:08      Urinalysis Basic - ( 22 Dec 2023 07:08 )    Color: x / Appearance: x / SG: x / pH: x  Gluc: 189 mg/dL / Ketone: x  / Bili: x / Urobili: x   Blood: x / Protein: x / Nitrite: x   Leuk Esterase: x / RBC: x / WBC x   Sq Epi: x / Non Sq Epi: x / Bacteria: x      CAPILLARY BLOOD GLUCOSE      POCT Blood Glucose.: 237 mg/dL (22 Dec 2023 12:23)  POCT Blood Glucose.: 173 mg/dL (22 Dec 2023 08:16)  POCT Blood Glucose.: 177 mg/dL (21 Dec 2023 22:31)  POCT Blood Glucose.: 144 mg/dL (21 Dec 2023 17:25)      RADIOLOGY & ADDITIONAL TESTS:    Notes Reviewed:  [ x] YES  [ ] NO    Care Discussed with Consultants/Other Providers [x ] YES  [ ] NO

## 2023-12-22 NOTE — GOALS OF CARE CONVERSATION - ADVANCED CARE PLANNING - CONVERSATION DETAILS
Palliative care team met with patient at bedside to discuss advanced care planning. Reviewed patient's medical and social history as well as events leading to patient's hospitalization. Writer discussed patient's current diagnosis (Sepsis s/s to UTI, Afib), medical condition and management. Patient states that he is a LTC resident at [facility]. Reviewed with patient the MOLST on chart sent from facility. He states that orders on MOLST are to remain the same which include CPR, Intubate, long term feeding tube, use antibiotics, use IV fluids, Send to hospital. Inquired about HCP form. Patient states he does not have a HCP and does not want to complete one because he has no family or friends, or anyone he would choose to act as his health care agent. Patient showed insight into medical condition. All questions answered. Psychosocial support provided. Palliative care team met with patient at bedside to discuss advanced care planning. Reviewed patient's medical and social history as well as events leading to patient's hospitalization. Writer discussed patient's current diagnosis (Sepsis s/s to UTI, Afib), medical condition and management. Patient states that he is a LTC resident at [facility]. Reviewed with patient the MOLST on chart sent from facility. He states that orders on MOLST are to remain the same which include CPR, Intubate, long term feeding tube, use antibiotics, use IV fluids, Send to hospital. Inquired about HCP form. Patient states he does not have a HCP and does not want to complete one because he has no family or friends, or anyone he would choose to act as his health care agent. Patient showed insight into medical condition. All questions answered. Psychosocial support provided.

## 2023-12-22 NOTE — DIETITIAN INITIAL EVALUATION ADULT - OTHER INFO
3 yr old male presenting with sepsis secondary to UTI, afib w RVR       Problem/Plan - 1:  ·  Problem: Sepsis secondary to UTI.   ·  Plan: iv zosyn, iv fluids  follow up cultures  ID eval dr Whitten et al appreciated.     Problem/Plan - 2:  ·  Problem: Afib.   ·  Plan: cont apixaban  cont metoprolol  cardizem gtt  cardio eval dr Soto et al appreciated.     Problem/Plan - 3:  ·  Problem: Diabetes.   ·  Plan: insulin  endo eval dr C Perlman appreciated.     53 yr old male presenting with sepsis secondary to UTI, afib w RVR.    At time of RD visit to pts bedside, pt reports he is 6'2" weighs ~ 250# recently , has gained 30# since feb 2022 and states its due to being more sedentary and eating a but more, knows he needs to lose the wt, feels constipated and nursing aware, last BM was tuesday ( 3 days ago) .He is receiving magnesium hydroxide . Pt states he does not need diet education, educated in the past and knows what he needs to do . Encouragement provided

## 2023-12-22 NOTE — DIETITIAN INITIAL EVALUATION ADULT - NUTRITON FOCUSED PHYSICAL EXAM
Notified Dr. Mc Sutton via perfect serve regarding patient temperature and blood pressure. States will be handled in the morning. no...

## 2023-12-22 NOTE — DIETITIAN INITIAL EVALUATION ADULT - ADD RECOMMEND
d/c renal restriction, indication not seen d/c renal restriction, indication not seen  order polyethylene Glycol

## 2023-12-22 NOTE — PROGRESS NOTE ADULT - ASSESSMENT
52yo M with a PMH of CVA, chronic PE (on Eliquis), atrial fibrillation, hypertension, BPH, type 2 diabetes, chronic Aguilera, hyperlipidemia, GERD, CAD (s/p stent to RCA), peripheral vascular disease, hypothyroid who presents to the ED for SOB, chills and lethargy, found to be in rapid Afib w/ RVR. Patient states he used to follow with Cardio Dr. [place], but has not followed up with Cardio in many years.     A fib RVR, chr PE, HTN, HLD, CAD, stents, PVD  - Hx of PAF but no clear documentation on whether the patient has had recent episodes of Afib  - Previously on Amiodarone, but d/c due to patient having negative side effects affecting his thyroid   - EKbpm, Afib w/ RVR  - S/p IV Cardizem 10mg x1  - On Cardizem gtt, change to Cardizem 60 mg PO Q6H  - Tele w/ A fib 80-110s   - Patient is already on Eliquis for chronic PE --> continue Eliquis for AC   - Continue Metoprolol tartrate, increase to 50mg Q8H and titrate up if needed   - Repeat EKG when patients HR stabilizes     - Hx of CAD s/p 1 stent to RCA   - EKbpm, Afib w/ RVR  - Troponin: <-98.2 likely elevated in the setting of demand ischemia  - Hold ASA, as patient is medically frail and no need to increase bleeding risk   - Continue statin   - Repeat EKG when patients HR stabilizes     - Pro bnp [id number]  - TTE (2019): EF 65%, mild MR, concentric LVH, grade II diastolic dysfunction    - Continue home Lasix 40mg PO daily   - Creatinine:  <--1.40,  <--1.10  - Continue Fluid restriction  - Repeat TTE   - Strict I/Os, daily weights    - BP stable and controlled  - Hold home Losartan and Imdur to allow for AV yung agent titration     - Monitor and replete lytes, keep K>4, Mg>2.  - Will continue to follow.    Danny Arce, MS FNP, AGACNP  Nurse Practitioner- Cardiology   Please call on TEAMS       54yo M with a PMH of CVA, chronic PE (on Eliquis), atrial fibrillation, hypertension, BPH, type 2 diabetes, chronic Aguilera, hyperlipidemia, GERD, CAD (s/p stent to RCA), peripheral vascular disease, hypothyroid who presents to the ED for SOB, chills and lethargy, found to be in rapid Afib w/ RVR. Patient states he used to follow with Cardio Dr. [place], but has not followed up with Cardio in many years.     A fib RVR, chr PE, HTN, HLD, CAD, stents, PVD  - Hx of PAF but no clear documentation on whether the patient has had recent episodes of Afib  - Previously on Amiodarone, but d/c due to patient having negative side effects affecting his thyroid   - EKbpm, Afib w/ RVR  - S/p IV Cardizem 10mg x1  - On Cardizem gtt, change to Cardizem 60 mg PO Q6H  - Tele w/ A fib 80-110s   - Patient is already on Eliquis for chronic PE --> continue Eliquis for AC   - Continue Metoprolol tartrate, increase to 50mg Q8H and titrate up if needed   - Repeat EKG when patients HR stabilizes     - Hx of CAD s/p 1 stent to RCA   - EKbpm, Afib w/ RVR  - Troponin: <-98.2 likely elevated in the setting of demand ischemia  - Hold ASA, as patient is medically frail and no need to increase bleeding risk   - Continue statin   - Repeat EKG when patients HR stabilizes     - Pro bnp [id number]  - TTE (2019): EF 65%, mild MR, concentric LVH, grade II diastolic dysfunction    - Continue home Lasix 40mg PO daily   - Creatinine:  <--1.40,  <--1.10  - Continue Fluid restriction  - Repeat TTE   - Strict I/Os, daily weights    - BP stable and controlled  - Hold home Losartan and Imdur to allow for AV yung agent titration     - Monitor and replete lytes, keep K>4, Mg>2.  - Will continue to follow.    Danny Arce, MS FNP, AGACNP  Nurse Practitioner- Cardiology   Please call on TEAMS

## 2023-12-22 NOTE — CONSULT NOTE ADULT - SUBJECTIVE AND OBJECTIVE BOX
Date/Time Patient Seen:  		  Referring MD:   Data Reviewed	       Patient is a 53y old  Male who presents with a chief complaint of UTI (22 Dec 2023 13:14)      Subjective/HPI   53-year-old male with a history of a CVA, chronic PE, atrial fibrillation, hypertension, BPH, type 2 diabetes, chronic Aguilera, hyperlipidemia, GERD, CAD, peripheral vascular disease, hypothyroid presents with brought in by EMS from [facility] for generalized weakness, urinary symptoms, feeling of obstruction, tachycardia, possible infection.  No nausea or vomiting.  No acute diarrhea.  No chest pain or shortness of breath.  Patient complaining of lower abdominal discomfort.  No neck or back pain.  No aggravating or alleviating factors otherwise noted.  No other acute injury or complaints.  Patient immunizations up to date  PAST MEDICAL & SURGICAL HISTORY:  No pertinent past medical history    Diabetes    No pertinent past medical history    Diabetes mellitus with no complication    Afib    Hypertension    BPH (benign prostatic hyperplasia)    Perforated gastric ulcer  s/p emergent ex-lap omentopexy and plication 6/2019    Pulmonary embolism    History of non-ST elevation myocardial infarction (NSTEMI)    Osteomyelitis  s/p debridement    CAD S/P percutaneous coronary angioplasty    Cerebrovascular accident    No significant past surgical history    No significant past surgical history    H/O abdominal surgery    Perforated gastric ulcer    Traumatic amputation of left foot, initial encounter      PAST SURGICAL HISTORY:  H/O abdominal surgery     Perforated gastric ulcer     Traumatic amputation of left foot, initial encounter.     FAMILY HISTORY:  FH: coronary artery disease, Father  FH: pulmonary embolism, Mother  FH: stroke, Father.     Social History:  · Substance use	No     Tobacco Screening:  · Core Measure Site	Yes  · Has the patient used tobacco in the past 30 days?	No    Risk Assessment:    Present on Admission:  Deep Venous Thrombosis	no  Pulmonary Embolus	no     HIV Screening:  · In accordance with [place] State law, we offer every patient who comes to our ED an HIV test. Would you like to be tested today?	Opt out        Medication list         MEDICATIONS  (STANDING):  apixaban 5 milliGRAM(s) Oral every 12 hours  aspirin  chewable 81 milliGRAM(s) Oral daily  atorvastatin 40 milliGRAM(s) Oral at bedtime  bethanechol 25 milliGRAM(s) Oral three times a day  cefTRIAXone   IVPB 2000 milliGRAM(s) IV Intermittent every 24 hours  cholecalciferol 1000 Unit(s) Oral daily  dextrose 50% Injectable 12.5 Gram(s) IV Push once  dextrose 50% Injectable 25 Gram(s) IV Push once  dextrose 50% Injectable 25 Gram(s) IV Push once  dextrose Oral Gel 15 Gram(s) Oral once  diltiazem    Tablet 60 milliGRAM(s) Oral every 6 hours  famotidine    Tablet 20 milliGRAM(s) Oral daily  furosemide    Tablet 40 milliGRAM(s) Oral two times a day  gabapentin 300 milliGRAM(s) Oral daily  glucagon  Injectable 1 milliGRAM(s) IntraMuscular once  insulin glargine Injectable (LANTUS) 10 Unit(s) SubCutaneous at bedtime  insulin lispro (ADMELOG) corrective regimen sliding scale   SubCutaneous three times a day before meals  insulin lispro (ADMELOG) corrective regimen sliding scale   SubCutaneous at bedtime  isosorbide   mononitrate ER Tablet (IMDUR) 60 milliGRAM(s) Oral daily  losartan 100 milliGRAM(s) Oral daily  melatonin 5 milliGRAM(s) Oral at bedtime  methimazole 10 milliGRAM(s) Oral daily  metoclopramide 5 milliGRAM(s) Oral three times a day  metoprolol tartrate 50 milliGRAM(s) Oral every 8 hours  multivitamin/minerals 1 Tablet(s) Oral daily  oxybutynin 10 milliGRAM(s) Oral two times a day  pantoprazole    Tablet 40 milliGRAM(s) Oral before breakfast  phenazopyridine 100 milliGRAM(s) Oral every 8 hours  polyethylene glycol 3350 17 Gram(s) Oral two times a day  potassium chloride    Tablet ER 10 milliEquivalent(s) Oral daily  saccharomyces boulardii 250 milliGRAM(s) Oral two times a day  senna 2 Tablet(s) Oral at bedtime  sodium chloride 0.9%. 1000 milliLiter(s) (75 mL/Hr) IV Continuous <Continuous>  sucralfate 1 Gram(s) Oral four times a day  tamsulosin 0.8 milliGRAM(s) Oral at bedtime    MEDICATIONS  (PRN):  acetaminophen     Tablet .. 650 milliGRAM(s) Oral every 6 hours PRN Temp greater or equal to 38C (100.4F), Mild Pain (1 - 3)  bisacodyl 5 milliGRAM(s) Oral every 12 hours PRN Constipation  magnesium hydroxide Suspension 30 milliLiter(s) Oral at bedtime PRN Constipation  sodium chloride 0.65% Nasal 1 Spray(s) Both Nostrils three times a day PRN Nasal Congestion         Vitals log        ICU Vital Signs Last 24 Hrs  T(C): 36.7 (22 Dec 2023 12:07), Max: 37.4 (21 Dec 2023 20:29)  T(F): 98.1 (22 Dec 2023 12:07), Max: 99.3 (21 Dec 2023 20:29)  HR: 84 (22 Dec 2023 12:07) (84 - 93)  BP: 102/73 (22 Dec 2023 12:07) (101/61 - 106/72)  BP(mean): --  ABP: --  ABP(mean): --  RR: 18 (22 Dec 2023 12:07) (18 - 19)  SpO2: 92% (22 Dec 2023 12:07) (90% - 93%)    O2 Parameters below as of 22 Dec 2023 12:07  Patient On (Oxygen Delivery Method): nasal cannula  O2 Flow (L/min): 2               Input and Output:  I&O's Detail    21 Dec 2023 07:01  -  22 Dec 2023 07:00  --------------------------------------------------------  IN:    Diltiazem: 40 mL    Diltiazem: 80 mL    sodium chloride 0.9%: 900 mL  Total IN: 1020 mL    OUT:    Indwelling Catheter - Urethral (mL): 1300 mL  Total OUT: 1300 mL    Total NET: -280 mL          Lab Data                        10.7   8.66  )-----------( 119      ( 22 Dec 2023 07:08 )             33.0     12-22    140  |  110<H>  |  20  ----------------------------<  189<H>  3.8   |  24  |  0.99    Ca    7.8<L>      22 Dec 2023 07:08    TPro  6.3  /  Alb  2.4<L>  /  TBili  0.4  /  DBili  x   /  AST  38<H>  /  ALT  24  /  AlkPhos  92  12-22            Review of Systems	  weakness      Objective     Physical Examination    heart s1s2  lung dc BS  head nc  verbal      Pertinent Lab findings & Imaging      Ale:  NO   Adequate UO     I&O's Detail    21 Dec 2023 07:01  -  22 Dec 2023 07:00  --------------------------------------------------------  IN:    Diltiazem: 40 mL    Diltiazem: 80 mL    sodium chloride 0.9%: 900 mL  Total IN: 1020 mL    OUT:    Indwelling Catheter - Urethral (mL): 1300 mL  Total OUT: 1300 mL    Total NET: -280 mL               Discussed with:     Cultures:	        Radiology      ACC: [id number] EXAM:  CT RENAL STONE GIL   ORDERED BY: DEREK CHANCE     PROCEDURE DATE:  12/20/2023          INTERPRETATION:  CLINICAL INFORMATION: UTI. Retention. Sepsis. Evaluate   for stone.    COMPARISON: CT abdomen/pelvis 1/28/2022    CONTRAST/COMPLICATIONS:  IV Contrast: NONE  Oral Contrast: NONE  Complications: None reported at time of study completion    PROCEDURE:  CT of the Abdomen and Pelvis was performed.  Sagittal and coronal reformats were performed.    FINDINGS:  LOWER CHEST: Linear atelectasis or scarring at the lung bases. Coronary   artery calcifications.    LIVER: Within normal limits.  BILE DUCTS: Normal caliber.  GALLBLADDER: Normal caliber wall. No calcified gallstones. Small   calcifications along the periphery of the bladder, possibly granulomas.  SPLEEN: Within normal limits.  PANCREAS: Mildly atrophic with fatty infiltration.  ADRENALS: Within normal limits.  KIDNEYS/URETERS: Nonspecific bilateral perinephric fat stranding, greater   on the left. Mild fullness of the left renal pelvis. No hydronephrosis on   the right. No intrarenal calculi bilaterally. No hydroureter or ureteral   calculi bilaterally.    BLADDER: Underdistended with a Aguilera catheter. Air in the urinary bladder   compatible with the instrumentation. Mild infiltration of the   perivesicular fat.  REPRODUCTIVE ORGANS: Prostate not enlarged.    BOWEL: No bowel obstruction. Appendix is normal. Scattered colonic   diverticula without evidence of diverticulitis. Moderate amount retained   fecal material in the right hemicolon and transverse colon.  PERITONEUM: No ascites.  VESSELS: Atherosclerotic changes. Abdominal aorta normal in caliber.   Retroaortic left renal vein, an anatomic variant.  RETROPERITONEUM/LYMPH NODES: Mildly enlarged left para-aortic lymph nodes   including a representative lymph node measuring 1.5 x 0.9 cm (series 2   image 49), previously 1.4 x 0.8 cm, not significantly changed  ABDOMINAL WALL: Fat-containing umbilical hernia.  BONES: Degenerative changes in the spine. No aggressive osseous lesion.   Old left-sided rib fractures.    IMPRESSION:    Mild infiltration of the perivesicular fat, likely cystitis.    Bilateral perinephric fat stranding, greater on the left which is a   nonspecific finding, however may reflect bilateral ascending urinary   tract infections.    Mild fullness of the left renal pelvis.    No hydronephrosis on the right.    No urinary tract calculus bilaterally.    --- End of Report ---            LETY CASTELLANOS MD; Attending Radiologist  This document has been electronically signed. Dec 20 2023 10:39AM                         Date/Time Patient Seen:  		  Referring MD:   Data Reviewed	       Patient is a 53y old  Male who presents with a chief complaint of UTI (22 Dec 2023 13:14)      Subjective/HPI   53-year-old male with a history of a CVA, chronic PE, atrial fibrillation, hypertension, BPH, type 2 diabetes, chronic Aguilera, hyperlipidemia, GERD, CAD, peripheral vascular disease, hypothyroid presents with brought in by EMS from [facility] for generalized weakness, urinary symptoms, feeling of obstruction, tachycardia, possible infection.  No nausea or vomiting.  No acute diarrhea.  No chest pain or shortness of breath.  Patient complaining of lower abdominal discomfort.  No neck or back pain.  No aggravating or alleviating factors otherwise noted.  No other acute injury or complaints.  Patient immunizations up to date  PAST MEDICAL & SURGICAL HISTORY:  No pertinent past medical history    Diabetes    No pertinent past medical history    Diabetes mellitus with no complication    Afib    Hypertension    BPH (benign prostatic hyperplasia)    Perforated gastric ulcer  s/p emergent ex-lap omentopexy and plication 6/2019    Pulmonary embolism    History of non-ST elevation myocardial infarction (NSTEMI)    Osteomyelitis  s/p debridement    CAD S/P percutaneous coronary angioplasty    Cerebrovascular accident    No significant past surgical history    No significant past surgical history    H/O abdominal surgery    Perforated gastric ulcer    Traumatic amputation of left foot, initial encounter      PAST SURGICAL HISTORY:  H/O abdominal surgery     Perforated gastric ulcer     Traumatic amputation of left foot, initial encounter.     FAMILY HISTORY:  FH: coronary artery disease, Father  FH: pulmonary embolism, Mother  FH: stroke, Father.     Social History:  · Substance use	No     Tobacco Screening:  · Core Measure Site	Yes  · Has the patient used tobacco in the past 30 days?	No    Risk Assessment:    Present on Admission:  Deep Venous Thrombosis	no  Pulmonary Embolus	no     HIV Screening:  · In accordance with [place] law, we offer every patient who comes to our ED an HIV test. Would you like to be tested today?	Opt out        Medication list         MEDICATIONS  (STANDING):  apixaban 5 milliGRAM(s) Oral every 12 hours  aspirin  chewable 81 milliGRAM(s) Oral daily  atorvastatin 40 milliGRAM(s) Oral at bedtime  bethanechol 25 milliGRAM(s) Oral three times a day  cefTRIAXone   IVPB 2000 milliGRAM(s) IV Intermittent every 24 hours  cholecalciferol 1000 Unit(s) Oral daily  dextrose 50% Injectable 12.5 Gram(s) IV Push once  dextrose 50% Injectable 25 Gram(s) IV Push once  dextrose 50% Injectable 25 Gram(s) IV Push once  dextrose Oral Gel 15 Gram(s) Oral once  diltiazem    Tablet 60 milliGRAM(s) Oral every 6 hours  famotidine    Tablet 20 milliGRAM(s) Oral daily  furosemide    Tablet 40 milliGRAM(s) Oral two times a day  gabapentin 300 milliGRAM(s) Oral daily  glucagon  Injectable 1 milliGRAM(s) IntraMuscular once  insulin glargine Injectable (LANTUS) 10 Unit(s) SubCutaneous at bedtime  insulin lispro (ADMELOG) corrective regimen sliding scale   SubCutaneous three times a day before meals  insulin lispro (ADMELOG) corrective regimen sliding scale   SubCutaneous at bedtime  isosorbide   mononitrate ER Tablet (IMDUR) 60 milliGRAM(s) Oral daily  losartan 100 milliGRAM(s) Oral daily  melatonin 5 milliGRAM(s) Oral at bedtime  methimazole 10 milliGRAM(s) Oral daily  metoclopramide 5 milliGRAM(s) Oral three times a day  metoprolol tartrate 50 milliGRAM(s) Oral every 8 hours  multivitamin/minerals 1 Tablet(s) Oral daily  oxybutynin 10 milliGRAM(s) Oral two times a day  pantoprazole    Tablet 40 milliGRAM(s) Oral before breakfast  phenazopyridine 100 milliGRAM(s) Oral every 8 hours  polyethylene glycol 3350 17 Gram(s) Oral two times a day  potassium chloride    Tablet ER 10 milliEquivalent(s) Oral daily  saccharomyces boulardii 250 milliGRAM(s) Oral two times a day  senna 2 Tablet(s) Oral at bedtime  sodium chloride 0.9%. 1000 milliLiter(s) (75 mL/Hr) IV Continuous <Continuous>  sucralfate 1 Gram(s) Oral four times a day  tamsulosin 0.8 milliGRAM(s) Oral at bedtime    MEDICATIONS  (PRN):  acetaminophen     Tablet .. 650 milliGRAM(s) Oral every 6 hours PRN Temp greater or equal to 38C (100.4F), Mild Pain (1 - 3)  bisacodyl 5 milliGRAM(s) Oral every 12 hours PRN Constipation  magnesium hydroxide Suspension 30 milliLiter(s) Oral at bedtime PRN Constipation  sodium chloride 0.65% Nasal 1 Spray(s) Both Nostrils three times a day PRN Nasal Congestion         Vitals log        ICU Vital Signs Last 24 Hrs  T(C): 36.7 (22 Dec 2023 12:07), Max: 37.4 (21 Dec 2023 20:29)  T(F): 98.1 (22 Dec 2023 12:07), Max: 99.3 (21 Dec 2023 20:29)  HR: 84 (22 Dec 2023 12:07) (84 - 93)  BP: 102/73 (22 Dec 2023 12:07) (101/61 - 106/72)  BP(mean): --  ABP: --  ABP(mean): --  RR: 18 (22 Dec 2023 12:07) (18 - 19)  SpO2: 92% (22 Dec 2023 12:07) (90% - 93%)    O2 Parameters below as of 22 Dec 2023 12:07  Patient On (Oxygen Delivery Method): nasal cannula  O2 Flow (L/min): 2               Input and Output:  I&O's Detail    21 Dec 2023 07:01  -  22 Dec 2023 07:00  --------------------------------------------------------  IN:    Diltiazem: 40 mL    Diltiazem: 80 mL    sodium chloride 0.9%: 900 mL  Total IN: 1020 mL    OUT:    Indwelling Catheter - Urethral (mL): 1300 mL  Total OUT: 1300 mL    Total NET: -280 mL          Lab Data                        10.7   8.66  )-----------( 119      ( 22 Dec 2023 07:08 )             33.0     12-22    140  |  110<H>  |  20  ----------------------------<  189<H>  3.8   |  24  |  0.99    Ca    7.8<L>      22 Dec 2023 07:08    TPro  6.3  /  Alb  2.4<L>  /  TBili  0.4  /  DBili  x   /  AST  38<H>  /  ALT  24  /  AlkPhos  92  12-22            Review of Systems	  weakness      Objective     Physical Examination    heart s1s2  lung dc BS  head nc  verbal      Pertinent Lab findings & Imaging      Ale:  NO   Adequate UO     I&O's Detail    21 Dec 2023 07:01  -  22 Dec 2023 07:00  --------------------------------------------------------  IN:    Diltiazem: 40 mL    Diltiazem: 80 mL    sodium chloride 0.9%: 900 mL  Total IN: 1020 mL    OUT:    Indwelling Catheter - Urethral (mL): 1300 mL  Total OUT: 1300 mL    Total NET: -280 mL               Discussed with:     Cultures:	        Radiology      ACC: [id number] EXAM:  CT RENAL STONE GIL   ORDERED BY: DEREK CHANCE     PROCEDURE DATE:  12/20/2023          INTERPRETATION:  CLINICAL INFORMATION: UTI. Retention. Sepsis. Evaluate   for stone.    COMPARISON: CT abdomen/pelvis 1/28/2022    CONTRAST/COMPLICATIONS:  IV Contrast: NONE  Oral Contrast: NONE  Complications: None reported at time of study completion    PROCEDURE:  CT of the Abdomen and Pelvis was performed.  Sagittal and coronal reformats were performed.    FINDINGS:  LOWER CHEST: Linear atelectasis or scarring at the lung bases. Coronary   artery calcifications.    LIVER: Within normal limits.  BILE DUCTS: Normal caliber.  GALLBLADDER: Normal caliber wall. No calcified gallstones. Small   calcifications along the periphery of the bladder, possibly granulomas.  SPLEEN: Within normal limits.  PANCREAS: Mildly atrophic with fatty infiltration.  ADRENALS: Within normal limits.  KIDNEYS/URETERS: Nonspecific bilateral perinephric fat stranding, greater   on the left. Mild fullness of the left renal pelvis. No hydronephrosis on   the right. No intrarenal calculi bilaterally. No hydroureter or ureteral   calculi bilaterally.    BLADDER: Underdistended with a Aguilera catheter. Air in the urinary bladder   compatible with the instrumentation. Mild infiltration of the   perivesicular fat.  REPRODUCTIVE ORGANS: Prostate not enlarged.    BOWEL: No bowel obstruction. Appendix is normal. Scattered colonic   diverticula without evidence of diverticulitis. Moderate amount retained   fecal material in the right hemicolon and transverse colon.  PERITONEUM: No ascites.  VESSELS: Atherosclerotic changes. Abdominal aorta normal in caliber.   Retroaortic left renal vein, an anatomic variant.  RETROPERITONEUM/LYMPH NODES: Mildly enlarged left para-aortic lymph nodes   including a representative lymph node measuring 1.5 x 0.9 cm (series 2   image 49), previously 1.4 x 0.8 cm, not significantly changed  ABDOMINAL WALL: Fat-containing umbilical hernia.  BONES: Degenerative changes in the spine. No aggressive osseous lesion.   Old left-sided rib fractures.    IMPRESSION:    Mild infiltration of the perivesicular fat, likely cystitis.    Bilateral perinephric fat stranding, greater on the left which is a   nonspecific finding, however may reflect bilateral ascending urinary   tract infections.    Mild fullness of the left renal pelvis.    No hydronephrosis on the right.    No urinary tract calculus bilaterally.    --- End of Report ---            LETY CASTELLANOS MD; Attending Radiologist  This document has been electronically signed. Dec 20 2023 10:39AM

## 2023-12-22 NOTE — PROGRESS NOTE ADULT - SUBJECTIVE AND OBJECTIVE BOX
[facility] INFECTIOUS DISEASE  Juventino Whitten MD PhD, Symone Hickey MD, Anne-Marie Li MD, Jeffery Jacobs MD, Ryan Romeo MD  and providing coverage with Melody Armstrong MD  Providing Infectious Disease Consultations at [facility], [facility], [facility]'s    Office# [phone] to schedule follow up appointments  Answering Service for urgent calls or New Consults [phone]  Cell# to text for urgent issues Juventino Whitten [phone]     infectious diseases progress note:    SARAH MORRISON is a 53y y. o. Male patient    Overnight and events of the last 24hrs reviewed    Allergies    No Known Drug Allergies  fish (Hives)    Intolerances        ANTIBIOTICS/RELEVANT:  antimicrobials  cefTRIAXone   IVPB 2000 milliGRAM(s) IV Intermittent every 24 hours    immunologic:    OTHER:  acetaminophen     Tablet .. 650 milliGRAM(s) Oral every 6 hours PRN  apixaban 5 milliGRAM(s) Oral every 12 hours  aspirin  chewable 81 milliGRAM(s) Oral daily  atorvastatin 40 milliGRAM(s) Oral at bedtime  bethanechol 25 milliGRAM(s) Oral three times a day  dextrose 50% Injectable 25 Gram(s) IV Push once  dextrose 50% Injectable 25 Gram(s) IV Push once  dextrose 50% Injectable 12.5 Gram(s) IV Push once  dextrose Oral Gel 15 Gram(s) Oral once  diltiazem Infusion 10 mG/Hr IV Continuous <Continuous>  famotidine    Tablet 20 milliGRAM(s) Oral daily  gabapentin 100 milliGRAM(s) Oral at bedtime  glucagon  Injectable 1 milliGRAM(s) IntraMuscular once  insulin glargine Injectable (LANTUS) 10 Unit(s) SubCutaneous at bedtime  insulin lispro (ADMELOG) corrective regimen sliding scale   SubCutaneous at bedtime  insulin lispro (ADMELOG) corrective regimen sliding scale   SubCutaneous three times a day before meals  magnesium hydroxide Suspension 30 milliLiter(s) Oral at bedtime PRN  methimazole 10 milliGRAM(s) Oral daily  metoprolol tartrate 25 milliGRAM(s) Oral every 6 hours  pantoprazole    Tablet 40 milliGRAM(s) Oral before breakfast  phenazopyridine 100 milliGRAM(s) Oral every 8 hours  saccharomyces boulardii 250 milliGRAM(s) Oral two times a day  sodium chloride 0.9%. 1000 milliLiter(s) IV Continuous <Continuous>  sucralfate 1 Gram(s) Oral four times a day  tamsulosin 0.8 milliGRAM(s) Oral at bedtime      Objective:  Vital Signs Last 24 Hrs  T(C): 37 (22 Dec 2023 04:58), Max: 37.4 (21 Dec 2023 20:29)  T(F): 98.6 (22 Dec 2023 04:58), Max: 99.3 (21 Dec 2023 20:29)  HR: 90 (22 Dec 2023 04:58) (88 - 93)  BP: 106/72 (22 Dec 2023 04:58) (101/61 - 116/75)  BP(mean): --  RR: 18 (22 Dec 2023 04:58) (18 - 19)  SpO2: 90% (22 Dec 2023 04:58) (88% - 93%)    Parameters below as of 22 Dec 2023 04:58  Patient On (Oxygen Delivery Method): nasal cannula        T(C): 37 (12-22-23 @ 04:58), Max: 38.7 (12-21-23 @ 03:25)  T(C): 37 (12-22-23 @ 04:58), Max: 38.7 (12-21-23 @ 03:25)  T(C): 37 (12-22-23 @ 04:58), Max: 38.7 (12-21-23 @ 03:25)    PHYSICAL EXAM:  HEENT: NC atraumatic  Neck: supple  Respiratory: no accessory muscle use, breathing comfortably  Cardiovascular: distant  Gastrointestinal: normal appearing, nondistended  Extremities: no clubbing, no cyanosis,        LABS:                          10.7   8.66  )-----------( 119      ( 22 Dec 2023 07:08 )             33.0       WBC  8.66 12-22 @ 07:08  12.40 12-21 @ 04:45  6.32 12-20 @ 08:20      12-22    140  |  110<H>  |  20  ----------------------------<  189<H>  3.8   |  24  |  0.99    Ca    7.8<L>      22 Dec 2023 07:08    TPro  6.3  /  Alb  2.4<L>  /  TBili  0.4  /  DBili  x   /  AST  38<H>  /  ALT  24  /  AlkPhos  92  12-22      Creatinine: 0.99 mg/dL (12-22-23 @ 07:08)  Creatinine: 1.40 mg/dL (12-21-23 @ 04:45)  Creatinine: 1.10 mg/dL (12-20-23 @ 08:20)        Urinalysis Basic - ( 22 Dec 2023 07:08 )    Color: x / Appearance: x / SG: x / pH: x  Gluc: 189 mg/dL / Ketone: x  / Bili: x / Urobili: x   Blood: x / Protein: x / Nitrite: x   Leuk Esterase: x / RBC: x / WBC x   Sq Epi: x / Non Sq Epi: x / Bacteria: x            INFLAMMATORY MARKERS      MICROBIOLOGY:    Culture - Urine (12.20.23 @ 09:00)    Specimen Source: Clean Catch Clean Catch (Midstream)   Culture Results:   >100,000 CFU/ml Gram Negative Rods          RADIOLOGY & ADDITIONAL STUDIES:   [facility] INFECTIOUS DISEASE  Juventino Whitten MD PhD, Symone Hickey MD, Anne-Marie Li MD, Jeffery Jacobs MD, Ryna Romeo MD  and providing coverage with Melody Armstrong MD  Providing Infectious Disease Consultations at [facility], [facility], [facility]'s    Office# [phone] to schedule follow up appointments  Answering Service for urgent calls or New Consults [phone]  Cell# to text for urgent issues Juventino Whitten [phone]     infectious diseases progress note:    SARAH MORRISON is a 53y y. o. Male patient    Overnight and events of the last 24hrs reviewed    Allergies    No Known Drug Allergies  fish (Hives)    Intolerances        ANTIBIOTICS/RELEVANT:  antimicrobials  cefTRIAXone   IVPB 2000 milliGRAM(s) IV Intermittent every 24 hours    immunologic:    OTHER:  acetaminophen     Tablet .. 650 milliGRAM(s) Oral every 6 hours PRN  apixaban 5 milliGRAM(s) Oral every 12 hours  aspirin  chewable 81 milliGRAM(s) Oral daily  atorvastatin 40 milliGRAM(s) Oral at bedtime  bethanechol 25 milliGRAM(s) Oral three times a day  dextrose 50% Injectable 25 Gram(s) IV Push once  dextrose 50% Injectable 25 Gram(s) IV Push once  dextrose 50% Injectable 12.5 Gram(s) IV Push once  dextrose Oral Gel 15 Gram(s) Oral once  diltiazem Infusion 10 mG/Hr IV Continuous <Continuous>  famotidine    Tablet 20 milliGRAM(s) Oral daily  gabapentin 100 milliGRAM(s) Oral at bedtime  glucagon  Injectable 1 milliGRAM(s) IntraMuscular once  insulin glargine Injectable (LANTUS) 10 Unit(s) SubCutaneous at bedtime  insulin lispro (ADMELOG) corrective regimen sliding scale   SubCutaneous at bedtime  insulin lispro (ADMELOG) corrective regimen sliding scale   SubCutaneous three times a day before meals  magnesium hydroxide Suspension 30 milliLiter(s) Oral at bedtime PRN  methimazole 10 milliGRAM(s) Oral daily  metoprolol tartrate 25 milliGRAM(s) Oral every 6 hours  pantoprazole    Tablet 40 milliGRAM(s) Oral before breakfast  phenazopyridine 100 milliGRAM(s) Oral every 8 hours  saccharomyces boulardii 250 milliGRAM(s) Oral two times a day  sodium chloride 0.9%. 1000 milliLiter(s) IV Continuous <Continuous>  sucralfate 1 Gram(s) Oral four times a day  tamsulosin 0.8 milliGRAM(s) Oral at bedtime      Objective:  Vital Signs Last 24 Hrs  T(C): 37 (22 Dec 2023 04:58), Max: 37.4 (21 Dec 2023 20:29)  T(F): 98.6 (22 Dec 2023 04:58), Max: 99.3 (21 Dec 2023 20:29)  HR: 90 (22 Dec 2023 04:58) (88 - 93)  BP: 106/72 (22 Dec 2023 04:58) (101/61 - 116/75)  BP(mean): --  RR: 18 (22 Dec 2023 04:58) (18 - 19)  SpO2: 90% (22 Dec 2023 04:58) (88% - 93%)    Parameters below as of 22 Dec 2023 04:58  Patient On (Oxygen Delivery Method): nasal cannula        T(C): 37 (12-22-23 @ 04:58), Max: 38.7 (12-21-23 @ 03:25)  T(C): 37 (12-22-23 @ 04:58), Max: 38.7 (12-21-23 @ 03:25)  T(C): 37 (12-22-23 @ 04:58), Max: 38.7 (12-21-23 @ 03:25)    PHYSICAL EXAM:  HEENT: NC atraumatic  Neck: supple  Respiratory: no accessory muscle use, breathing comfortably  Cardiovascular: distant  Gastrointestinal: normal appearing, nondistended  Extremities: no clubbing, no cyanosis,        LABS:                          10.7   8.66  )-----------( 119      ( 22 Dec 2023 07:08 )             33.0       WBC  8.66 12-22 @ 07:08  12.40 12-21 @ 04:45  6.32 12-20 @ 08:20      12-22    140  |  110<H>  |  20  ----------------------------<  189<H>  3.8   |  24  |  0.99    Ca    7.8<L>      22 Dec 2023 07:08    TPro  6.3  /  Alb  2.4<L>  /  TBili  0.4  /  DBili  x   /  AST  38<H>  /  ALT  24  /  AlkPhos  92  12-22      Creatinine: 0.99 mg/dL (12-22-23 @ 07:08)  Creatinine: 1.40 mg/dL (12-21-23 @ 04:45)  Creatinine: 1.10 mg/dL (12-20-23 @ 08:20)        Urinalysis Basic - ( 22 Dec 2023 07:08 )    Color: x / Appearance: x / SG: x / pH: x  Gluc: 189 mg/dL / Ketone: x  / Bili: x / Urobili: x   Blood: x / Protein: x / Nitrite: x   Leuk Esterase: x / RBC: x / WBC x   Sq Epi: x / Non Sq Epi: x / Bacteria: x            INFLAMMATORY MARKERS      MICROBIOLOGY:    Culture - Urine (12.20.23 @ 09:00)    Specimen Source: Clean Catch Clean Catch (Midstream)   Culture Results:   >100,000 CFU/ml Gram Negative Rods          RADIOLOGY & ADDITIONAL STUDIES:

## 2023-12-22 NOTE — PROGRESS NOTE ADULT - ASSESSMENT
53-year-old male with a history of a CVA, chronic PE, atrial fibrillation, hypertension, BPH, type 2 diabetes, chronic Aguilera, hyperlipidemia, GERD, CAD, peripheral vascular disease, hypothyroid presents with brought in by EMS from SNF for generalized weakness, urinary symptoms, feeling of obstruction, tachycardia, RT CVA tenderness, bandemia  CT-Bilateral perinephric fat stranding, greater on the left which is a   nonspecific finding, however may reflect bilateral ascending urinary   tract infections.    RECOMMENDATIONS  1-Pyelonephritis/Klebsiella Bacteremia compelling with consistent history, physical exam, imaging and lab  -changed to ceftriaxone (started 12/20)-continue for now,  Culture - Blood (12.20.23 @ 08:15)    -  K. pneumoniae group: Detec (K. pneumoniae, K. quasipneumoniae, K. variicola)  -  Ceftriaxone: S <=1  Urine culture >100,000 CFU/ml Gram Negative Rods  12/21-rpt blood cultures collected 12/21 15:50    Thank you for consulting us and involving us in the management of this most interesting and challenging case.  We will follow along in the care of this patient. Please call us at [phone] or text me directly on my cell# at [phone] with any concerns.   53-year-old male with a history of a CVA, chronic PE, atrial fibrillation, hypertension, BPH, type 2 diabetes, chronic Aguilera, hyperlipidemia, GERD, CAD, peripheral vascular disease, hypothyroid presents with brought in by EMS from SNF for generalized weakness, urinary symptoms, feeling of obstruction, tachycardia, RT CVA tenderness, bandemia  CT-Bilateral perinephric fat stranding, greater on the left which is a   nonspecific finding, however may reflect bilateral ascending urinary   tract infections.    RECOMMENDATIONS  1-Pyelonephritis/Klebsiella Bacteremia compelling with consistent history, physical exam, imaging and lab  -changed to ceftriaxone (started 12/20)-continue for now,  Culture - Blood (12.20.23 @ 08:15)    -  K. pneumoniae group: Detec (K. pneumoniae, K. quasipneumoniae, K. variicola)  -  Ceftriaxone: S <=1  Urine culture >100,000 CFU/ml Gram Negative Rods  12/21-rpt blood cultures collected 12/21 15:50    Thank you for consulting us and involving us in the management of this most interesting and challenging case.  We will follow along in the care of this patient. Please call us at [phone] or text me directly on my cell# at [phone] with any concerns.

## 2023-12-22 NOTE — DIETITIAN INITIAL EVALUATION ADULT - PERTINENT LABORATORY DATA
12-22    140  |  110<H>  |  20  ----------------------------<  189<H>  3.8   |  24  |  0.99    Ca    7.8<L>      22 Dec 2023 07:08    TPro  6.3  /  Alb  2.4<L>  /  TBili  0.4  /  DBili  x   /  AST  38<H>  /  ALT  24  /  AlkPhos  92  12-22  POCT Blood Glucose.: 173 mg/dL (12-22-23 @ 08:16)  A1C with Estimated Average Glucose Result: 7.0 % (12-20-23 @ 08:20)   12-22    140  |  110<H>  |  20  ----------------------------<  189<H>  3.8   |  24  |  0.99    Ca    7.8<L>      22 Dec 2023 07:08    TPro  6.3  /  Alb  2.4<L>  /  TBili  0.4  /  DBili  x   /  AST  38<H>  /  ALT  24  /  AlkPhos  92  12-22  POCT Blood Glucose.: 173 mg/dL (12-22-23 @ 08:16)  A1C with Estimated Average Glucose Result: 7.0 % (12-20-23 @ 08:20) wnl for persons with DM

## 2023-12-22 NOTE — CONSULT NOTE ADULT - ASSESSMENT
53-year-old male with a history of a CVA, chronic PE, atrial fibrillation, hypertension, BPH, type 2 diabetes, chronic Aguilera, hyperlipidemia, GERD, CAD, peripheral vascular disease, hypothyroid presents with brought in by EMS from SNF for generalized weakness, urinary symptoms, feeling of obstruction, tachycardia, possible infection    sepsis  uti  hydro  AF  OP  OA  Atelectasis  hx of PE  CVA hx  HTN  DM  chr Aguilera Cath  HLD  GERD  CAD    s/p OBI  on lasix   HF - eval and management  cardio f/u  I and O  serial renal indices  monitor VS and Sat  proBNP noted - c/w Vol Overload  on AC - Eliquis - hx of PE - AFIB  AF - rate and rhythm control  BP control  on EMP ABX - cx - biomarkers - CT imaging reviewed  ID input reviewed  long term resident of [facility]  GOC documented  FULL CODE  fio2 titration  goal sat > 88 pct   53-year-old male with a history of a CVA, chronic PE, atrial fibrillation, hypertension, BPH, type 2 diabetes, chronic Aguilera, hyperlipidemia, GERD, CAD, peripheral vascular disease, hypothyroid presents with brought in by EMS from SNF for generalized weakness, urinary symptoms, feeling of obstruction, tachycardia, possible infection    sepsis  uti  hydro  AF  OP  OA  Atelectasis  hx of PE  CVA hx  HTN  DM  chr Aguilera Cath  HLD  GERD  CAD    s/p OBI  on lasix   HF - eval and management  cardio f/u  I and O  serial renal indices  monitor VS and Sat  proBNP noted - c/w Vol Overload  on AC - Eliquis - hx of PE - AFIB  AF - rate and rhythm control  BP control  on EMP ABX - cx - biomarkers - CT imaging reviewed  ID input reviewed  long term resident of [facility]  GOC documented  FULL CODE  fio2 titration  goal sat > 88 pct

## 2023-12-22 NOTE — PROGRESS NOTE ADULT - SUBJECTIVE AND OBJECTIVE BOX
Culture - Blood (12.20.23 @ 08:20)    -  Ampicillin: R >16 These ampicillin results predict results for amoxicillin   -  Ampicillin/Sulbactam: R >16/8   -  Aztreonam: S <=4   -  Cefazolin: R 16   -  Cefepime: S <=2   -  Cefoxitin: S <=8   -  Ceftriaxone: S <=1   -  Ciprofloxacin: S <=0.25   -  Ertapenem: S <=0.5   -  Gentamicin: S <=2   -  Imipenem: S <=1   -  Levofloxacin: S <=0.5   -  Meropenem: S <=1   -  Piperacillin/Tazobactam: R 64   -  Tobramycin: S <=2   -  Trimethoprim/Sulfamethoxazole: S <=0.5/9.5   Gram Stain:   Growth in anaerobic bottle: Gram Negative Rods  Growth in aerobic bottle: Gram Negative Rods   Specimen Source: .Blood Blood-Peripheral   Organism: Klebsiella pneumoniae   Culture Results:   Growth in aerobic and anaerobic bottles: Klebsiella pneumoniae   Organism Identification: Klebsiella pneumoniae   Method Type: BAYLEE

## 2023-12-22 NOTE — PROGRESS NOTE ADULT - NS ATTEND AMEND GEN_ALL_CORE FT
Patient is a 54yo M with a PMH of CVA, chronic PE (on Eliquis), atrial fibrillation, hypertension, BPH, type 2 diabetes, chronic Aguilera, hyperlipidemia, GERD, CAD (s/p stent to RCA), peripheral vascular disease, hypothyroid who presents to the ED for SOB, chills and lethargy, found to be in rapid Afib w/ RVR.     nh resident  hx of paf, but does not have definite sxs, and unclear burden of af  previously on amio, stopped for thyroid issues  now with rapid af in the setting of apparent recurrent urinary infection   dilt gtt, with plan to transition to po  cont bb  ac  mild elev trop, trend to peak  mild vol ol, cont po lasix and can increase if needed; if creatinine truly normalized, will give iv lasix Patient is a 52yo M with a PMH of CVA, chronic PE (on Eliquis), atrial fibrillation, hypertension, BPH, type 2 diabetes, chronic Aguilera, hyperlipidemia, GERD, CAD (s/p stent to RCA), peripheral vascular disease, hypothyroid who presents to the ED for SOB, chills and lethargy, found to be in rapid Afib w/ RVR.     [facility] resident  hx of paf, but does not have definite sxs, and unclear burden of af  previously on amio, stopped for thyroid issues  now with rapid af in the setting of apparent recurrent urinary infection   dilt gtt, with plan to transition to po  cont bb  ac  mild elev trop, trend to peak  mild vol ol, cont po lasix and can increase if needed; if creatinine truly normalized, will give iv lasix

## 2023-12-22 NOTE — DIETITIAN INITIAL EVALUATION ADULT - PERTINENT MEDS FT
MEDICATIONS  (STANDING):  apixaban 5 milliGRAM(s) Oral every 12 hours  aspirin  chewable 81 milliGRAM(s) Oral daily  atorvastatin 40 milliGRAM(s) Oral at bedtime  bethanechol 25 milliGRAM(s) Oral three times a day  cefTRIAXone   IVPB 2000 milliGRAM(s) IV Intermittent every 24 hours  dextrose 50% Injectable 25 Gram(s) IV Push once  dextrose 50% Injectable 12.5 Gram(s) IV Push once  dextrose 50% Injectable 25 Gram(s) IV Push once  dextrose Oral Gel 15 Gram(s) Oral once  diltiazem Infusion 10 mG/Hr (10 mL/Hr) IV Continuous <Continuous>  famotidine    Tablet 20 milliGRAM(s) Oral daily  gabapentin 100 milliGRAM(s) Oral at bedtime  glucagon  Injectable 1 milliGRAM(s) IntraMuscular once  insulin glargine Injectable (LANTUS) 10 Unit(s) SubCutaneous at bedtime  insulin lispro (ADMELOG) corrective regimen sliding scale   SubCutaneous three times a day before meals  insulin lispro (ADMELOG) corrective regimen sliding scale   SubCutaneous at bedtime  methimazole 10 milliGRAM(s) Oral daily  metoprolol tartrate 25 milliGRAM(s) Oral every 6 hours  pantoprazole    Tablet 40 milliGRAM(s) Oral before breakfast  phenazopyridine 100 milliGRAM(s) Oral every 8 hours  saccharomyces boulardii 250 milliGRAM(s) Oral two times a day  sodium chloride 0.9%. 1000 milliLiter(s) (75 mL/Hr) IV Continuous <Continuous>  sucralfate 1 Gram(s) Oral four times a day  tamsulosin 0.8 milliGRAM(s) Oral at bedtime    MEDICATIONS  (PRN):  acetaminophen     Tablet .. 650 milliGRAM(s) Oral every 6 hours PRN Temp greater or equal to 38C (100.4F), Mild Pain (1 - 3)  magnesium hydroxide Suspension 30 milliLiter(s) Oral at bedtime PRN Constipation

## 2023-12-22 NOTE — CASE MANAGEMENT PROGRESS NOTE - NSCMPROGRESSNOTE_GEN_ALL_CORE
Patient remains acute, remains on IV antibiotics, cardizem drip discontinued, to be started on PO cardizem.

## 2023-12-22 NOTE — PROGRESS NOTE ADULT - SUBJECTIVE AND OBJECTIVE BOX
[facility] -- Brittany Lutz, Reynaldo Sweeney Savella, Goodger, Cohen: Office # [phone]    Follow Up:  A fib     Subjective/Observations: Patient seen and examined. Patient awake, alert, resting in bed. No complaints of chest pain, dyspnea, palpitations or dizziness. No signs of orthopnea or PND. Tolerating O2 via nasal cannula. Continuing on Cardizem gtt @ 10, Rates in 80-110s,    REVIEW OF SYSTEMS: All other review of systems are negative unless indicated above    PAST MEDICAL & SURGICAL HISTORY:  Diabetes      Diabetes mellitus with no complication      Afib      Hypertension      Hypertension      BPH (benign prostatic hyperplasia)      Perforated gastric ulcer  s/p emergent ex-lap omentopexy and plication 6/2019      Pulmonary embolism      History of non-ST elevation myocardial infarction (NSTEMI)      Osteomyelitis  s/p debridement      CAD S/P percutaneous coronary angioplasty      Cerebrovascular accident      H/O abdominal surgery      Perforated gastric ulcer      Traumatic amputation of left foot, initial encounter    MEDICATIONS  (STANDING):  apixaban 5 milliGRAM(s) Oral every 12 hours  aspirin  chewable 81 milliGRAM(s) Oral daily  atorvastatin 40 milliGRAM(s) Oral at bedtime  bethanechol 25 milliGRAM(s) Oral three times a day  cefTRIAXone   IVPB 2000 milliGRAM(s) IV Intermittent every 24 hours  dextrose 50% Injectable 25 Gram(s) IV Push once  dextrose 50% Injectable 25 Gram(s) IV Push once  dextrose 50% Injectable 12.5 Gram(s) IV Push once  dextrose Oral Gel 15 Gram(s) Oral once  diltiazem Infusion 10 mG/Hr (10 mL/Hr) IV Continuous <Continuous>  famotidine    Tablet 20 milliGRAM(s) Oral daily  gabapentin 100 milliGRAM(s) Oral at bedtime  glucagon  Injectable 1 milliGRAM(s) IntraMuscular once  insulin glargine Injectable (LANTUS) 10 Unit(s) SubCutaneous at bedtime  insulin lispro (ADMELOG) corrective regimen sliding scale   SubCutaneous three times a day before meals  insulin lispro (ADMELOG) corrective regimen sliding scale   SubCutaneous at bedtime  methimazole 10 milliGRAM(s) Oral daily  metoprolol tartrate 25 milliGRAM(s) Oral every 6 hours  pantoprazole    Tablet 40 milliGRAM(s) Oral before breakfast  phenazopyridine 100 milliGRAM(s) Oral every 8 hours  saccharomyces boulardii 250 milliGRAM(s) Oral two times a day  sodium chloride 0.9%. 1000 milliLiter(s) (75 mL/Hr) IV Continuous <Continuous>  sucralfate 1 Gram(s) Oral four times a day  tamsulosin 0.8 milliGRAM(s) Oral at bedtime    MEDICATIONS  (PRN):  acetaminophen     Tablet .. 650 milliGRAM(s) Oral every 6 hours PRN Temp greater or equal to 38C (100.4F), Mild Pain (1 - 3)  magnesium hydroxide Suspension 30 milliLiter(s) Oral at bedtime PRN Constipation    Allergies  No Known Drug Allergies  fish (Hives)    Vital Signs Last 24 Hrs  T(C): 37 (22 Dec 2023 04:58), Max: 37.4 (21 Dec 2023 20:29)  T(F): 98.6 (22 Dec 2023 04:58), Max: 99.3 (21 Dec 2023 20:29)  HR: 90 (22 Dec 2023 04:58) (88 - 93)  BP: 106/72 (22 Dec 2023 04:58) (101/61 - 116/75)  BP(mean): --  RR: 18 (22 Dec 2023 04:58) (18 - 19)  SpO2: 90% (22 Dec 2023 04:58) (88% - 93%)    Parameters below as of 22 Dec 2023 04:58  Patient On (Oxygen Delivery Method): nasal cannula      I&O's Summary    21 Dec 2023 07:01  -  22 Dec 2023 07:00  --------------------------------------------------------  IN: 1020 mL / OUT: 1300 mL / NET: -280 mL    TELE: Afib 80-110s  PHYSICAL EXAM:  Constitutional: NAD, awake and alert  HEENT: Moist Mucous Membranes, Anicteric  Pulmonary: Non-labored, breath sounds are clear bilaterally, No wheezing, rales or rhonchi  Cardiovascular: Regular, S1 and S2, No murmurs, No rubs, gallops or clicks  Gastrointestinal:  soft, nontender, nondistended   Lymph: No peripheral edema. No lymphadenopathy.   Skin: No visible rashes or ulcers.  Psych:  Mood & affect appropriate    LABS: All Labs Reviewed:                        10.7   8.66  )-----------( 119      ( 22 Dec 2023 07:08 )             33.0                         11.6   12.40 )-----------( 142      ( 21 Dec 2023 04:45 )             36.1                         13.3   6.32  )-----------( 187      ( 20 Dec 2023 08:20 )             39.7     22 Dec 2023 07:08    140    |  110    |  20     ----------------------------<  189    3.8     |  24     |  0.99   21 Dec 2023 04:45    139    |  107    |  24     ----------------------------<  149    4.4     |  27     |  1.40   20 Dec 2023 08:20    138    |  106    |  16     ----------------------------<  158    4.0     |  26     |  1.10     Ca    7.8        22 Dec 2023 07:08  Ca    7.9        21 Dec 2023 04:45  Ca    9.1        20 Dec 2023 08:20    TPro  6.3    /  Alb  2.4    /  TBili  0.4    /  DBili  x      /  AST  38     /  ALT  24     /  AlkPhos  92     22 Dec 2023 07:08  TPro  7.5    /  Alb  3.3    /  TBili  0.7    /  DBili  x      /  AST  17     /  ALT  21     /  AlkPhos  138    20 Dec 2023 08:20   LIVER FUNCTIONS - ( 22 Dec 2023 07:08 )  Alb: 2.4 g/dL / Pro: 6.3 g/dL / ALK PHOS: 92 U/L / ALT: 24 U/L / AST: 38 U/L / GGT: x           Creatine Kinase, Serum: 78 U/L (12-20-23 @ 08:20)  Troponin I, High Sensitivity Result: 98.2 ng/L (12-20-23 @ 08:20)  Lactate, Blood: 1.9 mmol/L (12-20-23 @ 10:50)  Lactate, Blood: 2.3 mmol/L (12-20-23 @ 08:20)    12 Lead ECG:   Ventricular Rate 164 BPM    QRS Duration 78 ms    Q-T Interval 232 ms    QTC Calculation(Bazett) 383 ms    R Axis 3 degrees    T Axis 60 degrees    Diagnosis Line *** Critical Test Result: High HR  Atrial fibrillation with rapid ventricular response  Low voltage QRS  Abnormal ECG  When compared with ECG of 16-JUN-2021 08:29,  Atrial fibrillation has replaced Sinus rhythm  Vent. rate has increased BY  97 BPM  Nonspecific T wave abnormality now evident in Lateral leads  Confirmed by Jovanni Soto MD ([id number]) on 12/20/2023 2:27:15 PM (12-20-23 @ 08:11)       EXAM:  ECHO TTE WO CON COMP W DOPPLR         PROCEDURE DATE:  12/24/2019        INTERPRETATION:  INDICATION: Coronary artery disease    Blood Pressure 117/71    Height 187     Weight 93       BSA 2.2    Dimensions:    LA 4.2       Normal Values: 2.0 - 4.0 cm    Ao 4.0        Normal Values: 2.0 - 3.8 cm  SEPTUM 1.6       Normal Values: 0.6 - 1.2 cm  PWT 1.6       Normal Values: 0.6 - 1.1 cm  LVIDd 5.8         Normal Values: 3.0 - 5.6 cm  LVIDs 3.2         Normal Values: 1.8 - 4.0 cm    Derived Variables:  LVMI g/m2  RWT  Fractional Short  Ejection Fraction    Doppler Peak v. AoV= (m/sec)    OBSERVATIONS:    Mitral Valve: normal, mild MR.  Aortic Valve/Aorta: normal trileaflet aortic valve.  Tricuspid Valve: normal with trace TR.  Pulmonic Valve: normal  Left Atrium: Enlarged  Right Atrium: normal  Left Ventricle: Severe concentric LVH with normal systolic function, estimated LVEF of 65%.  Right Ventricle: normal size and systolic function.  Pericardium/Pleura: no significant pleural effusion, no significant pericardial effusion.  Pulmonary/RV Pressure: Inadequate TR jet to estimate PA systolic pressure  LV Diastolic Function: Grade 2diastolic dysfunction.    Severe concentric LVH, and mild LV enlargement, with normal systolic function, estimated LVEF of 65%. Normal right ventricular size and systolic function. Grade 2diastolic dysfunction. Left atrial enlargement The aortic root is normal in size. The mitral valve is structurally normal, mild MR. The aortic valve is trileaflet without stenosis or insufficiency. Trace physiologic TR. Inadequate TR jet to estimate PA systolic pressure No significant pericardial effusion.      JOVANNI SOTO M.D., ATTENDING CARDIOLOGIST  This document has been electronically signed. Dec 25 2019 11:45AM [facility] -- Brittany Lutz, Reynaldo Sweeney Savella, Goodger, Cohen: Office # [phone]    Follow Up:  A fib     Subjective/Observations: Patient seen and examined. Patient awake, alert, resting in bed. No complaints of chest pain, dyspnea, palpitations or dizziness. No signs of orthopnea or PND. Tolerating O2 via nasal cannula. Continuing on Cardizem gtt @ 10, Rates in 80-110s,    REVIEW OF SYSTEMS: All other review of systems are negative unless indicated above    PAST MEDICAL & SURGICAL HISTORY:  Diabetes      Diabetes mellitus with no complication      Afib      Hypertension      Hypertension      BPH (benign prostatic hyperplasia)      Perforated gastric ulcer  s/p emergent ex-lap omentopexy and plication 6/2019      Pulmonary embolism      History of non-ST elevation myocardial infarction (NSTEMI)      Osteomyelitis  s/p debridement      CAD S/P percutaneous coronary angioplasty      Cerebrovascular accident      H/O abdominal surgery      Perforated gastric ulcer      Traumatic amputation of left foot, initial encounter    MEDICATIONS  (STANDING):  apixaban 5 milliGRAM(s) Oral every 12 hours  aspirin  chewable 81 milliGRAM(s) Oral daily  atorvastatin 40 milliGRAM(s) Oral at bedtime  bethanechol 25 milliGRAM(s) Oral three times a day  cefTRIAXone   IVPB 2000 milliGRAM(s) IV Intermittent every 24 hours  dextrose 50% Injectable 25 Gram(s) IV Push once  dextrose 50% Injectable 25 Gram(s) IV Push once  dextrose 50% Injectable 12.5 Gram(s) IV Push once  dextrose Oral Gel 15 Gram(s) Oral once  diltiazem Infusion 10 mG/Hr (10 mL/Hr) IV Continuous <Continuous>  famotidine    Tablet 20 milliGRAM(s) Oral daily  gabapentin 100 milliGRAM(s) Oral at bedtime  glucagon  Injectable 1 milliGRAM(s) IntraMuscular once  insulin glargine Injectable (LANTUS) 10 Unit(s) SubCutaneous at bedtime  insulin lispro (ADMELOG) corrective regimen sliding scale   SubCutaneous three times a day before meals  insulin lispro (ADMELOG) corrective regimen sliding scale   SubCutaneous at bedtime  methimazole 10 milliGRAM(s) Oral daily  metoprolol tartrate 25 milliGRAM(s) Oral every 6 hours  pantoprazole    Tablet 40 milliGRAM(s) Oral before breakfast  phenazopyridine 100 milliGRAM(s) Oral every 8 hours  saccharomyces boulardii 250 milliGRAM(s) Oral two times a day  sodium chloride 0.9%. 1000 milliLiter(s) (75 mL/Hr) IV Continuous <Continuous>  sucralfate 1 Gram(s) Oral four times a day  tamsulosin 0.8 milliGRAM(s) Oral at bedtime    MEDICATIONS  (PRN):  acetaminophen     Tablet .. 650 milliGRAM(s) Oral every 6 hours PRN Temp greater or equal to 38C (100.4F), Mild Pain (1 - 3)  magnesium hydroxide Suspension 30 milliLiter(s) Oral at bedtime PRN Constipation    Allergies  No Known Drug Allergies  fish (Hives)    Vital Signs Last 24 Hrs  T(C): 37 (22 Dec 2023 04:58), Max: 37.4 (21 Dec 2023 20:29)  T(F): 98.6 (22 Dec 2023 04:58), Max: 99.3 (21 Dec 2023 20:29)  HR: 90 (22 Dec 2023 04:58) (88 - 93)  BP: 106/72 (22 Dec 2023 04:58) (101/61 - 116/75)  BP(mean): --  RR: 18 (22 Dec 2023 04:58) (18 - 19)  SpO2: 90% (22 Dec 2023 04:58) (88% - 93%)    Parameters below as of 22 Dec 2023 04:58  Patient On (Oxygen Delivery Method): nasal cannula      I&O's Summary    21 Dec 2023 07:01  -  22 Dec 2023 07:00  --------------------------------------------------------  IN: 1020 mL / OUT: 1300 mL / NET: -280 mL    TELE: Afib 80-110s  PHYSICAL EXAM:  Constitutional: NAD, awake and alert  HEENT: Moist Mucous Membranes, Anicteric  Pulmonary: Non-labored, breath sounds are clear bilaterally, No wheezing, rales or rhonchi  Cardiovascular: Regular, S1 and S2, No murmurs, No rubs, gallops or clicks  Gastrointestinal:  soft, nontender, nondistended   Lymph: No peripheral edema. No lymphadenopathy.   Skin: No visible rashes or ulcers.  Psych:  Mood & affect appropriate    LABS: All Labs Reviewed:                        10.7   8.66  )-----------( 119      ( 22 Dec 2023 07:08 )             33.0                         11.6   12.40 )-----------( 142      ( 21 Dec 2023 04:45 )             36.1                         13.3   6.32  )-----------( 187      ( 20 Dec 2023 08:20 )             39.7     22 Dec 2023 07:08    140    |  110    |  20     ----------------------------<  189    3.8     |  24     |  0.99   21 Dec 2023 04:45    139    |  107    |  24     ----------------------------<  149    4.4     |  27     |  1.40   20 Dec 2023 08:20    138    |  106    |  16     ----------------------------<  158    4.0     |  26     |  1.10     Ca    7.8        22 Dec 2023 07:08  Ca    7.9        21 Dec 2023 04:45  Ca    9.1        20 Dec 2023 08:20    TPro  6.3    /  Alb  2.4    /  TBili  0.4    /  DBili  x      /  AST  38     /  ALT  24     /  AlkPhos  92     22 Dec 2023 07:08  TPro  7.5    /  Alb  3.3    /  TBili  0.7    /  DBili  x      /  AST  17     /  ALT  21     /  AlkPhos  138    20 Dec 2023 08:20   LIVER FUNCTIONS - ( 22 Dec 2023 07:08 )  Alb: 2.4 g/dL / Pro: 6.3 g/dL / ALK PHOS: 92 U/L / ALT: 24 U/L / AST: 38 U/L / GGT: x           Creatine Kinase, Serum: 78 U/L (12-20-23 @ 08:20)  Troponin I, High Sensitivity Result: 98.2 ng/L (12-20-23 @ 08:20)  Lactate, Blood: 1.9 mmol/L (12-20-23 @ 10:50)  Lactate, Blood: 2.3 mmol/L (12-20-23 @ 08:20)    12 Lead ECG:   Ventricular Rate 164 BPM    QRS Duration 78 ms    Q-T Interval 232 ms    QTC Calculation(Bazett) 383 ms    R Axis 3 degrees    T Axis 60 degrees    Diagnosis Line *** Critical Test Result: High HR  Atrial fibrillation with rapid ventricular response  Low voltage QRS  Abnormal ECG  When compared with ECG of 16-JUN-2021 08:29,  Atrial fibrillation has replaced Sinus rhythm  Vent. rate has increased BY  97 BPM  Nonspecific T wave abnormality now evident in Lateral leads  Confirmed by Jovanni Soto MD ([id number]) on 12/20/2023 2:27:15 PM (12-20-23 @ 08:11)       EXAM:  ECHO TTE WO CON COMP W DOPPLR         PROCEDURE DATE:  12/24/2019        INTERPRETATION:  INDICATION: Coronary artery disease    Blood Pressure 117/71    Height 187     Weight 93       BSA 2.2    Dimensions:    LA 4.2       Normal Values: 2.0 - 4.0 cm    Ao 4.0        Normal Values: 2.0 - 3.8 cm  SEPTUM 1.6       Normal Values: 0.6 - 1.2 cm  PWT 1.6       Normal Values: 0.6 - 1.1 cm  LVIDd 5.8         Normal Values: 3.0 - 5.6 cm  LVIDs 3.2         Normal Values: 1.8 - 4.0 cm    Derived Variables:  LVMI g/m2  RWT  Fractional Short  Ejection Fraction    Doppler Peak v. AoV= (m/sec)    OBSERVATIONS:    Mitral Valve: normal, mild MR.  Aortic Valve/Aorta: normal trileaflet aortic valve.  Tricuspid Valve: normal with trace TR.  Pulmonic Valve: normal  Left Atrium: Enlarged  Right Atrium: normal  Left Ventricle: Severe concentric LVH with normal systolic function, estimated LVEF of 65%.  Right Ventricle: normal size and systolic function.  Pericardium/Pleura: no significant pleural effusion, no significant pericardial effusion.  Pulmonary/RV Pressure: Inadequate TR jet to estimate PA systolic pressure  LV Diastolic Function: Grade 2diastolic dysfunction.    Severe concentric LVH, and mild LV enlargement, with normal systolic function, estimated LVEF of 65%. Normal right ventricular size and systolic function. Grade 2diastolic dysfunction. Left atrial enlargement The aortic root is normal in size. The mitral valve is structurally normal, mild MR. The aortic valve is trileaflet without stenosis or insufficiency. Trace physiologic TR. Inadequate TR jet to estimate PA systolic pressure No significant pericardial effusion.      JOVANNI SOTO M.D., ATTENDING CARDIOLOGIST  This document has been electronically signed. Dec 25 2019 11:45AM Composite Graft Text: The defect edges were debeveled with a #15 scalpel blade.  Given the location of the defect, shape of the defect, the proximity to free margins and the fact the defect was full thickness a composite graft was deemed most appropriate.  The defect was outline and then transferred to the donor site.  A full thickness graft was then excised from the donor site. The graft was then placed in the primary defect, oriented appropriately and then sutured into place.  The secondary defect was then repaired using a primary closure.

## 2023-12-22 NOTE — OCCUPATIONAL THERAPY INITIAL EVALUATION ADULT - PERTINENT HX OF CURRENT PROBLEM, REHAB EVAL
53 year-old male with a history of a CVA, chronic PE, atrial fibrillation, hypertension, BPH, type 2 diabetes, chronic Aguilera, HLD, GERD, CAD, PVD, hypothyroid brought in by EMS from SNF for generalized weakness, urinary symptoms, feeling of obstruction, tachycardia, possible infection.  No nausea or vomiting.  No acute diarrhea.  No chest pain or shortness of breath.  Patient complaining of lower abdominal discomfort.  Patient admitted 12/20/23 with sepsis due to UTI.

## 2023-12-22 NOTE — OCCUPATIONAL THERAPY INITIAL EVALUATION ADULT - LEVEL OF INDEPENDENCE: DRESS LOWER BODY, OT EVAL
patient required assistance while seated on EOB though reports no difficulty dressing self at SNF/moderate assist (50% patients effort)

## 2023-12-22 NOTE — OCCUPATIONAL THERAPY INITIAL EVALUATION ADULT - GENERAL OBSERVATIONS, REHAB EVAL
Patient found supine in bed with +IV right UE, +telemonitor, +Aguilera catheter and 2 lpm O2 via nc. Patient chart reviewed, events noted. Patient cleared to be seen for therapy by RN prior to session.

## 2023-12-22 NOTE — PROGRESS NOTE ADULT - ASSESSMENT
53-year-old male with a history of a CVA, chronic PE, atrial fibrillation, hypertension, BPH, type 2 diabetes, chronic Aguilera, hyperlipidemia, GERD, CAD, peripheral vascular disease, hypothyroid presents with brought in by EMS from SNF for generalized weakness, urinary symptoms, feeling of obstruction, tachycardia, RT CVA tenderness, bandemia  CT-Bilateral perinephric fat stranding, greater on the left which is a   nonspecific finding, however may reflect bilateral ascending urinary   tract infections.    RECOMMENDATIONS  1-Pyelonephritis/Klebsiella Bacteremia compelling with consistent history, physical exam, imaging and lab  -changed to ceftriaxone (started 12/20)-continue for now, pt is improcing, recs to follow based on sens  Culture - Blood (12.20.23 @ 08:15)    -  K. pneumoniae group: Detec (K. pneumoniae, K. quasipneumoniae, K. variicola)  Urine culture >100,000 CFU/ml Gram Negative Rods  12/21-rpt blood cultures collected 12/21 15:50      Thank you for consulting us and involving us in the management of this most interesting and challenging case.  We will follow along in the care of this patient. Please call us at [phone] or text me directly on my cell# at [phone] with any concerns.   53-year-old male with a history of a CVA, chronic PE, atrial fibrillation, hypertension, BPH, type 2 diabetes, chronic Aguilera, hyperlipidemia, GERD, CAD, peripheral vascular disease, hypothyroid presents with brought in by EMS from SNF for generalized weakness, urinary symptoms, feeling of obstruction, tachycardia, RT CVA tenderness, bandemia  CT-Bilateral perinephric fat stranding, greater on the left which is a   nonspecific finding, however may reflect bilateral ascending urinary   tract infections.    RECOMMENDATIONS  1-Pyelonephritis/Klebsiella Bacteremia compelling with consistent history, physical exam, imaging and lab  -changed to ceftriaxone (started 12/20)-continue for now, pt is improcing, recs to follow based on sens  Culture - Blood (12.20.23 @ 08:15)    -  K. pneumoniae group: Detec (K. pneumoniae, K. quasipneumoniae, K. variicola)  Urine culture >100,000 CFU/ml Gram Negative Rods  12/21-rpt blood cultures collected 12/21 15:50      Thank you for consulting us and involving us in the management of this most interesting and challenging case.  We will follow along in the care of this patient. Please call us at [phone] or text me directly on my cell# at [phone] with any concerns.

## 2023-12-23 ENCOUNTER — TRANSCRIPTION ENCOUNTER (OUTPATIENT)
Age: 53
End: 2023-12-23

## 2023-12-23 LAB
-  AMOXICILLIN/CLAVULANIC ACID: SIGNIFICANT CHANGE UP
-  AMOXICILLIN/CLAVULANIC ACID: SIGNIFICANT CHANGE UP
-  AMPICILLIN/SULBACTAM: SIGNIFICANT CHANGE UP
-  AMPICILLIN/SULBACTAM: SIGNIFICANT CHANGE UP
-  AMPICILLIN: SIGNIFICANT CHANGE UP
-  AMPICILLIN: SIGNIFICANT CHANGE UP
-  AZTREONAM: SIGNIFICANT CHANGE UP
-  AZTREONAM: SIGNIFICANT CHANGE UP
-  CEFAZOLIN: SIGNIFICANT CHANGE UP
-  CEFAZOLIN: SIGNIFICANT CHANGE UP
-  CEFEPIME: SIGNIFICANT CHANGE UP
-  CEFEPIME: SIGNIFICANT CHANGE UP
-  CEFOXITIN: SIGNIFICANT CHANGE UP
-  CEFOXITIN: SIGNIFICANT CHANGE UP
-  CEFTRIAXONE: SIGNIFICANT CHANGE UP
-  CEFTRIAXONE: SIGNIFICANT CHANGE UP
-  CEFUROXIME: SIGNIFICANT CHANGE UP
-  CEFUROXIME: SIGNIFICANT CHANGE UP
-  CIPROFLOXACIN: SIGNIFICANT CHANGE UP
-  CIPROFLOXACIN: SIGNIFICANT CHANGE UP
-  ERTAPENEM: SIGNIFICANT CHANGE UP
-  ERTAPENEM: SIGNIFICANT CHANGE UP
-  GENTAMICIN: SIGNIFICANT CHANGE UP
-  GENTAMICIN: SIGNIFICANT CHANGE UP
-  IMIPENEM: SIGNIFICANT CHANGE UP
-  IMIPENEM: SIGNIFICANT CHANGE UP
-  LEVOFLOXACIN: SIGNIFICANT CHANGE UP
-  LEVOFLOXACIN: SIGNIFICANT CHANGE UP
-  MEROPENEM: SIGNIFICANT CHANGE UP
-  MEROPENEM: SIGNIFICANT CHANGE UP
-  NITROFURANTOIN: SIGNIFICANT CHANGE UP
-  NITROFURANTOIN: SIGNIFICANT CHANGE UP
-  PIPERACILLIN/TAZOBACTAM: SIGNIFICANT CHANGE UP
-  PIPERACILLIN/TAZOBACTAM: SIGNIFICANT CHANGE UP
-  TOBRAMYCIN: SIGNIFICANT CHANGE UP
-  TOBRAMYCIN: SIGNIFICANT CHANGE UP
-  TRIMETHOPRIM/SULFAMETHOXAZOLE: SIGNIFICANT CHANGE UP
-  TRIMETHOPRIM/SULFAMETHOXAZOLE: SIGNIFICANT CHANGE UP
CULTURE RESULTS: ABNORMAL
CULTURE RESULTS: ABNORMAL
GLUCOSE BLDC GLUCOMTR-MCNC: 193 MG/DL — HIGH (ref 70–99)
GLUCOSE BLDC GLUCOMTR-MCNC: 193 MG/DL — HIGH (ref 70–99)
GLUCOSE BLDC GLUCOMTR-MCNC: 205 MG/DL — HIGH (ref 70–99)
GLUCOSE BLDC GLUCOMTR-MCNC: 205 MG/DL — HIGH (ref 70–99)
GLUCOSE BLDC GLUCOMTR-MCNC: 288 MG/DL — HIGH (ref 70–99)
GLUCOSE BLDC GLUCOMTR-MCNC: 288 MG/DL — HIGH (ref 70–99)
GLUCOSE BLDC GLUCOMTR-MCNC: 293 MG/DL — HIGH (ref 70–99)
GLUCOSE BLDC GLUCOMTR-MCNC: 293 MG/DL — HIGH (ref 70–99)
METHOD TYPE: SIGNIFICANT CHANGE UP
METHOD TYPE: SIGNIFICANT CHANGE UP
NT-PROBNP SERPL-SCNC: 2733 PG/ML — HIGH (ref 0–125)
NT-PROBNP SERPL-SCNC: 2733 PG/ML — HIGH (ref 0–125)
ORGANISM # SPEC MICROSCOPIC CNT: ABNORMAL
ORGANISM # SPEC MICROSCOPIC CNT: ABNORMAL
ORGANISM # SPEC MICROSCOPIC CNT: SIGNIFICANT CHANGE UP
ORGANISM # SPEC MICROSCOPIC CNT: SIGNIFICANT CHANGE UP
SPECIMEN SOURCE: SIGNIFICANT CHANGE UP
SPECIMEN SOURCE: SIGNIFICANT CHANGE UP

## 2023-12-23 PROCEDURE — 99232 SBSQ HOSP IP/OBS MODERATE 35: CPT

## 2023-12-23 PROCEDURE — 71045 X-RAY EXAM CHEST 1 VIEW: CPT | Mod: 26

## 2023-12-23 PROCEDURE — 93306 TTE W/DOPPLER COMPLETE: CPT | Mod: 26

## 2023-12-23 PROCEDURE — 93010 ELECTROCARDIOGRAM REPORT: CPT

## 2023-12-23 RX ORDER — PANTOPRAZOLE SODIUM 20 MG/1
1 TABLET, DELAYED RELEASE ORAL
Qty: 0 | Refills: 0 | DISCHARGE
Start: 2023-12-23

## 2023-12-23 RX ORDER — ACETAMINOPHEN 500 MG
2 TABLET ORAL
Qty: 0 | Refills: 0 | DISCHARGE
Start: 2023-12-23

## 2023-12-23 RX ORDER — METHIMAZOLE 10 MG/1
1 TABLET ORAL
Qty: 0 | Refills: 0 | DISCHARGE
Start: 2023-12-23

## 2023-12-23 RX ORDER — ISOSORBIDE MONONITRATE 60 MG/1
60 TABLET, EXTENDED RELEASE ORAL DAILY
Refills: 0 | Status: DISCONTINUED | OUTPATIENT
Start: 2023-12-23 | End: 2023-12-26

## 2023-12-23 RX ORDER — CEPHALEXIN 500 MG
1 CAPSULE ORAL
Qty: 0 | Refills: 0 | DISCHARGE
Start: 2023-12-23

## 2023-12-23 RX ORDER — SACCHAROMYCES BOULARDII 250 MG
1 POWDER IN PACKET (EA) ORAL
Qty: 0 | Refills: 0 | DISCHARGE
Start: 2023-12-23

## 2023-12-23 RX ORDER — CEPHALEXIN 500 MG
500 CAPSULE ORAL THREE TIMES A DAY
Refills: 0 | Status: DISCONTINUED | OUTPATIENT
Start: 2023-12-23 | End: 2023-12-23

## 2023-12-23 RX ORDER — AMLODIPINE BESYLATE 2.5 MG/1
1 TABLET ORAL
Refills: 0 | DISCHARGE

## 2023-12-23 RX ORDER — OXYCODONE HYDROCHLORIDE 5 MG/1
10 TABLET ORAL EVERY 12 HOURS
Refills: 0 | Status: DISCONTINUED | OUTPATIENT
Start: 2023-12-23 | End: 2023-12-26

## 2023-12-23 RX ORDER — DILTIAZEM HCL 120 MG
1 CAPSULE, EXT RELEASE 24 HR ORAL
Qty: 0 | Refills: 0 | DISCHARGE
Start: 2023-12-23

## 2023-12-23 RX ORDER — FUROSEMIDE 40 MG
40 TABLET ORAL
Refills: 0 | Status: DISCONTINUED | OUTPATIENT
Start: 2023-12-23 | End: 2023-12-26

## 2023-12-23 RX ORDER — LOSARTAN POTASSIUM 100 MG/1
100 TABLET, FILM COATED ORAL DAILY
Refills: 0 | Status: DISCONTINUED | OUTPATIENT
Start: 2023-12-23 | End: 2023-12-26

## 2023-12-23 RX ADMIN — Medication 1 TABLET(S): at 11:38

## 2023-12-23 RX ADMIN — ISOSORBIDE MONONITRATE 60 MILLIGRAM(S): 60 TABLET, EXTENDED RELEASE ORAL at 11:39

## 2023-12-23 RX ADMIN — APIXABAN 5 MILLIGRAM(S): 2.5 TABLET, FILM COATED ORAL at 05:45

## 2023-12-23 RX ADMIN — INSULIN GLARGINE 10 UNIT(S): 100 INJECTION, SOLUTION SUBCUTANEOUS at 22:17

## 2023-12-23 RX ADMIN — Medication 6: at 17:45

## 2023-12-23 RX ADMIN — LOSARTAN POTASSIUM 100 MILLIGRAM(S): 100 TABLET, FILM COATED ORAL at 05:45

## 2023-12-23 RX ADMIN — Medication 25 MILLIGRAM(S): at 14:35

## 2023-12-23 RX ADMIN — Medication 100 MILLIGRAM(S): at 14:34

## 2023-12-23 RX ADMIN — GABAPENTIN 300 MILLIGRAM(S): 400 CAPSULE ORAL at 18:53

## 2023-12-23 RX ADMIN — POLYETHYLENE GLYCOL 3350 17 GRAM(S): 17 POWDER, FOR SOLUTION ORAL at 18:04

## 2023-12-23 RX ADMIN — Medication 1000 UNIT(S): at 11:39

## 2023-12-23 RX ADMIN — Medication 60 MILLIGRAM(S): at 11:40

## 2023-12-23 RX ADMIN — Medication 60 MILLIGRAM(S): at 05:46

## 2023-12-23 RX ADMIN — OXYCODONE HYDROCHLORIDE 10 MILLIGRAM(S): 5 TABLET ORAL at 19:30

## 2023-12-23 RX ADMIN — Medication 40 MILLIGRAM(S): at 05:44

## 2023-12-23 RX ADMIN — Medication 4: at 08:33

## 2023-12-23 RX ADMIN — Medication 500 MILLIGRAM(S): at 15:54

## 2023-12-23 RX ADMIN — Medication 10 MILLIEQUIVALENT(S): at 11:40

## 2023-12-23 RX ADMIN — GABAPENTIN 300 MILLIGRAM(S): 400 CAPSULE ORAL at 05:45

## 2023-12-23 RX ADMIN — Medication 5 MILLIGRAM(S): at 14:35

## 2023-12-23 RX ADMIN — TAMSULOSIN HYDROCHLORIDE 0.8 MILLIGRAM(S): 0.4 CAPSULE ORAL at 22:15

## 2023-12-23 RX ADMIN — SENNA PLUS 2 TABLET(S): 8.6 TABLET ORAL at 22:15

## 2023-12-23 RX ADMIN — Medication 100 MILLIGRAM(S): at 05:44

## 2023-12-23 RX ADMIN — Medication 250 MILLIGRAM(S): at 05:44

## 2023-12-23 RX ADMIN — Medication 1 GRAM(S): at 22:15

## 2023-12-23 RX ADMIN — Medication 60 MILLIGRAM(S): at 17:57

## 2023-12-23 RX ADMIN — Medication 25 MILLIGRAM(S): at 22:15

## 2023-12-23 RX ADMIN — PANTOPRAZOLE SODIUM 40 MILLIGRAM(S): 20 TABLET, DELAYED RELEASE ORAL at 08:33

## 2023-12-23 RX ADMIN — Medication 6: at 12:34

## 2023-12-23 RX ADMIN — Medication 1 GRAM(S): at 05:44

## 2023-12-23 RX ADMIN — FAMOTIDINE 20 MILLIGRAM(S): 10 INJECTION INTRAVENOUS at 11:39

## 2023-12-23 RX ADMIN — Medication 250 MILLIGRAM(S): at 17:57

## 2023-12-23 RX ADMIN — OXYCODONE HYDROCHLORIDE 10 MILLIGRAM(S): 5 TABLET ORAL at 18:52

## 2023-12-23 RX ADMIN — Medication 81 MILLIGRAM(S): at 11:39

## 2023-12-23 RX ADMIN — ATORVASTATIN CALCIUM 40 MILLIGRAM(S): 80 TABLET, FILM COATED ORAL at 22:15

## 2023-12-23 RX ADMIN — Medication 5 MILLIGRAM(S): at 22:15

## 2023-12-23 RX ADMIN — APIXABAN 5 MILLIGRAM(S): 2.5 TABLET, FILM COATED ORAL at 17:58

## 2023-12-23 RX ADMIN — Medication 10 MILLIGRAM(S): at 05:45

## 2023-12-23 RX ADMIN — CEFTRIAXONE 100 MILLIGRAM(S): 500 INJECTION, POWDER, FOR SOLUTION INTRAMUSCULAR; INTRAVENOUS at 14:35

## 2023-12-23 RX ADMIN — Medication 1 GRAM(S): at 11:39

## 2023-12-23 RX ADMIN — Medication 100 MILLIGRAM(S): at 22:15

## 2023-12-23 RX ADMIN — Medication 50 MILLIGRAM(S): at 05:45

## 2023-12-23 RX ADMIN — Medication 40 MILLIGRAM(S): at 14:47

## 2023-12-23 RX ADMIN — Medication 50 MILLIGRAM(S): at 14:36

## 2023-12-23 RX ADMIN — Medication 1 GRAM(S): at 17:58

## 2023-12-23 RX ADMIN — Medication 10 MILLIGRAM(S): at 17:58

## 2023-12-23 RX ADMIN — POLYETHYLENE GLYCOL 3350 17 GRAM(S): 17 POWDER, FOR SOLUTION ORAL at 05:46

## 2023-12-23 RX ADMIN — Medication 25 MILLIGRAM(S): at 05:48

## 2023-12-23 RX ADMIN — Medication 5 MILLIGRAM(S): at 05:48

## 2023-12-23 NOTE — DISCHARGE NOTE PROVIDER - NSDCCPCAREPLAN_GEN_ALL_CORE_FT
PRINCIPAL DISCHARGE DIAGNOSIS  Diagnosis: Sepsis due to urinary tract infection  Assessment and Plan of Treatment: follow up with PMD Dr. koenig      SECONDARY DISCHARGE DIAGNOSES  Diagnosis: Rapid atrial fibrillation  Assessment and Plan of Treatment:     Diagnosis: Pyelonephritis  Assessment and Plan of Treatment:

## 2023-12-23 NOTE — DISCHARGE NOTE PROVIDER - PROVIDER TOKENS
PROVIDER:[TOKEN:[[id number]]],PROVIDER:[TOKEN:[[id number]]] PROVIDER:[TOKEN:[[id number]]],PROVIDER:[TOKEN:[[id number]]]

## 2023-12-23 NOTE — PROGRESS NOTE ADULT - ASSESSMENT
53-year-old male with a history of a CVA, chronic PE, atrial fibrillation, hypertension, BPH, type 2 diabetes, chronic Aguilera, hyperlipidemia, GERD, CAD, peripheral vascular disease, hypothyroid presents with brought in by EMS from SNF for generalized weakness, urinary symptoms, feeling of obstruction, tachycardia, possible infection    sepsis  uti  hydro  AF  OP  OA  Atelectasis  hx of PE  CVA hx  HTN  DM  chr Aguilera Cath  HLD  GERD  CAD    fio2 wean  vs noted    s/p OBI  on lasix   HF - eval and management  cardio f/u  I and O  serial renal indices  monitor VS and Sat  proBNP noted - c/w Vol Overload  on AC - Eliquis - hx of PE - AFIB  AF - rate and rhythm control  BP control  on EMP ABX - cx - biomarkers - CT imaging reviewed  ID input reviewed  long term resident of [facility]  GOC documented  FULL CODE  fio2 titration  goal sat > 88 pct 53-year-old male with a history of a CVA, chronic PE, atrial fibrillation, hypertension, BPH, type 2 diabetes, chronic Aguilera, hyperlipidemia, GERD, CAD, peripheral vascular disease, hypothyroid presents with brought in by EMS from SNF for generalized weakness, urinary symptoms, feeling of obstruction, tachycardia, possible infection    sepsis  uti  hydro  AF  OP  OA  Atelectasis  hx of PE  CVA hx  HTN  DM  chr Aguilera Cath  HLD  GERD  CAD    fio2 wean  vs noted    s/p OBI  on lasix   HF - eval and management  cardio f/u  I and O  serial renal indices  monitor VS and Sat  proBNP noted - c/w Vol Overload  on AC - Eliquis - hx of PE - AFIB  AF - rate and rhythm control  BP control  on EMP ABX - cx - biomarkers - CT imaging reviewed  ID input reviewed  long term resident of [facility]  GOC documented  FULL CODE  fio2 titration  goal sat > 88 pct

## 2023-12-23 NOTE — PROGRESS NOTE ADULT - ASSESSMENT
52yo M with a PMH of CVA, chronic PE (on Eliquis), atrial fibrillation, hypertension, BPH, type 2 diabetes, chronic Aguilera, hyperlipidemia, GERD, CAD (s/p stent to RCA), peripheral vascular disease, hypothyroid who presents to the ED for SOB, chills and lethargy, found to be in rapid Afib w/ RVR. Patient states he used to follow with Cardio Dr. [place], but has not followed up with Cardio in many years.     A fib RVR, chr PE, HTN, HLD, CAD, stents, PVD  - Hx of PAF but no clear documentation on whether the patient has had recent episodes of Afib  - Previously on Amiodarone, but d/c due to patient having negative side effects affecting his thyroid   - EKbpm, Afib w/ RVR  - Tele w/ A fib 70's, no events recorded overnight   - S/p IV Cardizem 10mg x1  - initially on Cardizem gtt, now on Cardizem 60 mg PO Q6H  - BP stable and controlled   - continue Eliquis for chronic PE and now Afib    - Continue Metoprolol tartrate, increase to 50mg Q8H and titrate up if needed   - home Losartan and Imdur on hold to allow for AV yung agent titration   - f/u repeat EKG (ordered) now rates are better controlled     - Hx of CAD s/p 1 stent to RCA   - EKbpm, Afib w/ RVR  - Troponin: <-98.2 likely elevated in the setting of demand ischemia  - continue ASA and statin    - no anginal complaints     - Pro bnp 3508  - TTE (2019): EF 65%, mild MR, concentric LVH, grade II diastolic dysfunction    - f/u TTE   - Continue home Lasix 40mg PO daily   - Continue Fluid restriction and Strict I/Os, daily weights  - Monitor and replete Lytes. Keep K > 4 and Mg > 2    - Will continue to follow.    Jennifer Gudino [facility]  Nurse Practitioner - Cardiology   call TEAMS   52yo M with a PMH of CVA, chronic PE (on Eliquis), atrial fibrillation, hypertension, BPH, type 2 diabetes, chronic Aguilera, hyperlipidemia, GERD, CAD (s/p stent to RCA), peripheral vascular disease, hypothyroid who presents to the ED for SOB, chills and lethargy, found to be in rapid Afib w/ RVR. Patient states he used to follow with Cardio Dr. [place], but has not followed up with Cardio in many years.     A fib RVR, chr PE, HTN, HLD, CAD, stents, PVD  - Hx of PAF but no clear documentation on whether the patient has had recent episodes of Afib  - Previously on Amiodarone, but d/c due to patient having negative side effects affecting his thyroid   - EKbpm, Afib w/ RVR  - Tele w/ A fib 70's, no events recorded overnight   - S/p IV Cardizem 10mg x1  - initially on Cardizem gtt, now on Cardizem 60 mg PO Q6H  - BP stable and controlled   - continue Eliquis for chronic PE and now Afib    - Continue Metoprolol tartrate, increase to 50mg Q8H and titrate up if needed   - home Losartan and Imdur on hold to allow for AV yung agent titration   - f/u repeat EKG (ordered) now rates are better controlled     - Hx of CAD s/p 1 stent to RCA   - EKbpm, Afib w/ RVR  - Troponin: <-98.2 likely elevated in the setting of demand ischemia  - continue ASA and statin    - no anginal complaints     - Pro bnp 3508  - TTE (2019): EF 65%, mild MR, concentric LVH, grade II diastolic dysfunction    - f/u TTE   - Continue home Lasix 40mg PO daily   - Continue Fluid restriction and Strict I/Os, daily weights  - Monitor and replete Lytes. Keep K > 4 and Mg > 2    - Will continue to follow.    Jennifer Gudino [facility]  Nurse Practitioner - Cardiology   call TEAMS

## 2023-12-23 NOTE — DISCHARGE NOTE PROVIDER - CARE PROVIDERS DIRECT ADDRESSES
,DirectAddress_Unknown,[email or web address] ,DirectAddress_Unknown,[email or web address]

## 2023-12-23 NOTE — DISCHARGE NOTE PROVIDER - HOSPITAL COURSE
admitted for UTI  sepsis from UTI  ABX per ID  rapid af - likely related to acute sepsis  rate control with cardizem  DC after ID clearance

## 2023-12-23 NOTE — PROGRESS NOTE ADULT - SUBJECTIVE AND OBJECTIVE BOX
Date of Service 12-23-23 @ 16:04    Patient is a 53y old  Male who presents with a chief complaint of UTI (23 Dec 2023 13:19)      INTERVAL /OVERNIGHT EVENTS: wants oxy    MEDICATIONS  (STANDING):  apixaban 5 milliGRAM(s) Oral every 12 hours  aspirin  chewable 81 milliGRAM(s) Oral daily  atorvastatin 40 milliGRAM(s) Oral at bedtime  bethanechol 25 milliGRAM(s) Oral three times a day  cefTRIAXone   IVPB 2000 milliGRAM(s) IV Intermittent every 24 hours  cholecalciferol 1000 Unit(s) Oral daily  dextrose 50% Injectable 12.5 Gram(s) IV Push once  dextrose 50% Injectable 25 Gram(s) IV Push once  dextrose 50% Injectable 25 Gram(s) IV Push once  dextrose Oral Gel 15 Gram(s) Oral once  diltiazem    Tablet 60 milliGRAM(s) Oral every 6 hours  famotidine    Tablet 20 milliGRAM(s) Oral daily  furosemide    Tablet 40 milliGRAM(s) Oral two times a day  gabapentin 300 milliGRAM(s) Oral two times a day  glucagon  Injectable 1 milliGRAM(s) IntraMuscular once  insulin glargine Injectable (LANTUS) 10 Unit(s) SubCutaneous at bedtime  insulin lispro (ADMELOG) corrective regimen sliding scale   SubCutaneous three times a day before meals  insulin lispro (ADMELOG) corrective regimen sliding scale   SubCutaneous at bedtime  isosorbide   mononitrate ER Tablet (IMDUR) 60 milliGRAM(s) Oral daily  losartan 100 milliGRAM(s) Oral daily  melatonin 5 milliGRAM(s) Oral at bedtime  methimazole 10 milliGRAM(s) Oral daily  metoclopramide 5 milliGRAM(s) Oral three times a day  metoprolol tartrate 50 milliGRAM(s) Oral every 8 hours  multivitamin/minerals 1 Tablet(s) Oral daily  oxybutynin 10 milliGRAM(s) Oral two times a day  oxyCODONE  ER Tablet 10 milliGRAM(s) Oral every 12 hours  pantoprazole    Tablet 40 milliGRAM(s) Oral before breakfast  phenazopyridine 100 milliGRAM(s) Oral every 8 hours  polyethylene glycol 3350 17 Gram(s) Oral two times a day  potassium chloride    Tablet ER 10 milliEquivalent(s) Oral daily  saccharomyces boulardii 250 milliGRAM(s) Oral two times a day  senna 2 Tablet(s) Oral at bedtime  sucralfate 1 Gram(s) Oral four times a day  tamsulosin 0.8 milliGRAM(s) Oral at bedtime    MEDICATIONS  (PRN):  acetaminophen     Tablet .. 650 milliGRAM(s) Oral every 6 hours PRN Temp greater or equal to 38C (100.4F), Mild Pain (1 - 3)  bisacodyl 5 milliGRAM(s) Oral every 12 hours PRN Constipation  magnesium hydroxide Suspension 30 milliLiter(s) Oral at bedtime PRN Constipation  sodium chloride 0.65% Nasal 1 Spray(s) Both Nostrils three times a day PRN Nasal Congestion      Allergies    No Known Drug Allergies  fish (Hives)    Intolerances        REVIEW OF SYSTEMS:  CONSTITUTIONAL: No fever, weight loss, or fatigue  EYES: No eye pain, visual disturbances, or discharge  ENMT:  No difficulty hearing, tinnitus, vertigo; No sinus or throat pain  NECK: No pain or stiffness  RESPIRATORY: No cough, wheezing, chills or hemoptysis; No shortness of breath  CARDIOVASCULAR: No chest pain, palpitations, dizziness, or leg swelling  GASTROINTESTINAL: No abdominal or epigastric pain. No nausea, vomiting, or hematemesis; No diarrhea or constipation. No melena or hematochezia.  GENITOURINARY: No dysuria, frequency, hematuria, or incontinence  NEUROLOGICAL: No headaches, memory loss, loss of strength, numbness, or tremors  SKIN: No itching, burning, rashes, or lesions   LYMPH NODES: No enlarged glands  ENDOCRINE: No heat or cold intolerance; No hair loss; No polydipsia or polyuria  MUSCULOSKELETAL: No joint pain or swelling; No muscle, back, or extremity pain  PSYCHIATRIC: No depression, anxiety, mood swings, or difficulty sleeping  HEME/LYMPH: No easy bruising, or bleeding gums  ALLERGY AND IMMUNOLOGIC: No hives or eczema    Vital Signs Last 24 Hrs  T(C): 36.9 (23 Dec 2023 11:36), Max: 36.9 (23 Dec 2023 11:36)  T(F): 98.4 (23 Dec 2023 11:36), Max: 98.4 (23 Dec 2023 11:36)  HR: 95 (23 Dec 2023 11:36) (81 - 95)  BP: 124/78 (23 Dec 2023 11:36) (102/58 - 124/78)  BP(mean): --  RR: 18 (23 Dec 2023 11:36) (18 - 18)  SpO2: 96% (23 Dec 2023 11:36) (93% - 96%)    Parameters below as of 23 Dec 2023 11:36  Patient On (Oxygen Delivery Method): nasal cannula  O2 Flow (L/min): 2      PHYSICAL EXAM:  GENERAL: NAD, well-groomed, well-developed  HEAD:  Atraumatic, Normocephalic  EYES: EOMI, PERRLA, conjunctiva and sclera clear  ENMT: No tonsillar erythema, exudates, or enlargement; Moist mucous membranes, Good dentition, No lesions  NECK: Supple, No JVD, Normal thyroid  NERVOUS SYSTEM:  Alert & Oriented X3, Good concentration; Motor Strength 5/5 B/L upper and lower extremities; DTRs 2+ intact and symmetric  CHEST/LUNG: Clear to auscultation bilaterally; No rales, rhonchi, wheezing, or rubs  HEART: Regular rate and rhythm; No murmurs, rubs, or gallops  ABDOMEN: Soft, Nontender, Nondistended; Bowel sounds present  EXTREMITIES:  2+ Peripheral Pulses, No clubbing, cyanosis, or edema  LYMPH: No lymphadenopathy noted  SKIN: No rashes or lesions    LABS:      Ca    7.8        22 Dec 2023 07:08        Urinalysis Basic - ( 22 Dec 2023 07:08 )    Color: x / Appearance: x / SG: x / pH: x  Gluc: 189 mg/dL / Ketone: x  / Bili: x / Urobili: x   Blood: x / Protein: x / Nitrite: x   Leuk Esterase: x / RBC: x / WBC x   Sq Epi: x / Non Sq Epi: x / Bacteria: x      CAPILLARY BLOOD GLUCOSE      POCT Blood Glucose.: 293 mg/dL (23 Dec 2023 11:59)  POCT Blood Glucose.: 205 mg/dL (23 Dec 2023 08:09)  POCT Blood Glucose.: 247 mg/dL (22 Dec 2023 22:39)  POCT Blood Glucose.: 243 mg/dL (22 Dec 2023 17:13)      RADIOLOGY & ADDITIONAL TESTS:    Notes Reviewed:  [x ] YES  [ ] NO    Care Discussed with Consultants/Other Providers [x ] YES  [ ] NO

## 2023-12-23 NOTE — DISCHARGE NOTE PROVIDER - CARE PROVIDER_API CALL
Grisel Murray  Internal Medicine  [address] [place] [address]  Phone: [phone]  Fax: [phone]  Follow Up Time:     Juventino Whitten  Infectious Disease  [address] [place] [id number]  Phone: [phone]  Fax: [phone]  Follow Up Time:    Grisel Murray  Internal Medicine  [address] [place] [address]  Phone: [phone]  Fax: [phone]  Follow Up Time:     Juventino Whitten  Infectious Disease  [address] [place] [id number]  Phone: [phone]  Fax: [phone]  Follow Up Time:

## 2023-12-23 NOTE — PROGRESS NOTE ADULT - ASSESSMENT
53-year-old male with a history of a CVA, chronic PE, atrial fibrillation, hypertension, BPH, type 2 diabetes, chronic Aguilera, hyperlipidemia, GERD, CAD, peripheral vascular disease, hypothyroid presents with brought in by EMS from SNF for generalized weakness, urinary symptoms, feeling of obstruction, tachycardia, RT CVA tenderness, bandemia  CT-Bilateral perinephric fat stranding, greater on the left which is a   nonspecific finding, however may reflect bilateral ascending urinary   tract infections.    RECOMMENDATIONS  1-Pyelonephritis/Klebsiella Bacteremia compelling with consistent history, physical exam, imaging and lab  -changed to ceftriaxone (started 12/20)-continue for now,  Culture - Blood (12.20.23 @ 08:15)    -  K. pneumoniae group: Detec (K. pneumoniae, K. quasipneumoniae, K. variicola)  -  Ceftriaxone: S <=1  Urine culture >100,000 CFU/ml Gram Negative Rods  12/21-rpt blood cultures collected 12/21 15:50-NGTD  -if rpt blood cultures remain NGTD past 48 hours then fine to dc on  Keflex 500mg PO QID with last day 12/29    Thank you for consulting us and involving us in the management of this most interesting and challenging case.  We will follow along in the care of this patient. Please call us at [phone] or text me directly on my cell# at [phone] with any concerns.   53-year-old male with a history of a CVA, chronic PE, atrial fibrillation, hypertension, BPH, type 2 diabetes, chronic Aguilera, hyperlipidemia, GERD, CAD, peripheral vascular disease, hypothyroid presents with brought in by EMS from SNF for generalized weakness, urinary symptoms, feeling of obstruction, tachycardia, RT CVA tenderness, bandemia  CT-Bilateral perinephric fat stranding, greater on the left which is a   nonspecific finding, however may reflect bilateral ascending urinary   tract infections.    RECOMMENDATIONS  1-Pyelonephritis/Klebsiella Bacteremia compelling with consistent history, physical exam, imaging and lab  -changed to ceftriaxone (started 12/20)-continue for now,  Culture - Blood (12.20.23 @ 08:15)    -  K. pneumoniae group: Detec (K. pneumoniae, K. quasipneumoniae, K. variicola)  -  Ceftriaxone: S <=1  Urine culture >100,000 CFU/ml Gram Negative Rods  12/21-rpt blood cultures collected 12/21 15:50-NGTD  -if rpt blood cultures remain NGTD past 48 hours then fine to dc on  Keflex 500mg PO QID with last day 12/29    Thank you for consulting us and involving us in the management of this most interesting and challenging case.  We will follow along in the care of this patient. Please call us at [phone] or text me directly on my cell# at [phone] with any concerns.

## 2023-12-23 NOTE — PROGRESS NOTE ADULT - NS ATTEND AMEND GEN_ALL_CORE FT
Patient is a 54yo M with a PMH of CVA, chronic PE (on Eliquis), atrial fibrillation, hypertension, BPH, type 2 diabetes, chronic Aguilera, hyperlipidemia, GERD, CAD (s/p stent to RCA), peripheral vascular disease, hypothyroid who presents to the ED for SOB, chills and lethargy, found to be in rapid Afib w/ RVR.     nh resident  hx of paf, but does not have definite sxs, and unclear burden of af  previously on amio, stopped for thyroid issues  now with rapid af in the setting of apparent recurrent urinary infection   s/p diltiazem gtt, now po  cont bb  ac  mild elev trop  mild vol ol, cont po lasix and can increase if needed  repeat echo performed today Patient is a 52yo M with a PMH of CVA, chronic PE (on Eliquis), atrial fibrillation, hypertension, BPH, type 2 diabetes, chronic Aguilera, hyperlipidemia, GERD, CAD (s/p stent to RCA), peripheral vascular disease, hypothyroid who presents to the ED for SOB, chills and lethargy, found to be in rapid Afib w/ RVR.     nh resident  hx of paf, but does not have definite sxs, and unclear burden of af  previously on amio, stopped for thyroid issues  now with rapid af in the setting of apparent recurrent urinary infection   s/p diltiazem gtt, now po  cont bb  ac  mild elev trop  mild vol ol, cont po lasix and can increase if needed  repeat echo performed today

## 2023-12-23 NOTE — DISCHARGE NOTE PROVIDER - NSDCMRMEDTOKEN_GEN_ALL_CORE_FT
acetaminophen 325 mg oral tablet: 2 tab(s) orally every 6 hours As needed Temp greater or equal to 38C (100.4F), Mild Pain (1 - 3)  apixaban 5 mg oral tablet: 1 tab(s) orally 2 times a day  aspirin 81 mg oral tablet, chewable: 1 tab(s) orally once a day  atorvastatin 40 mg oral tablet: 1 tab(s) orally once a day  bisacodyl 10 mg rectal suppository: 1 suppository(ies) rectally once a day as needed for  constipation  bisacodyl 5 mg oral delayed release tablet: 2 tab(s) orally once a day  cephalexin 500 mg oral capsule: 1 cap(s) orally 3 times a day  cholecalciferol 1250 mcg (50,000 intl units) oral capsule: 1 cap(s) orally once a month on the 15th  clotrimazole-betamethasone dipropionate 1%-0.05% topical cream: Apply topically to affected area 2 times a day  cranberry oral capsule: 1 orally once a day  dilTIAZem 60 mg oral tablet: 1 tab(s) orally every 6 hours  docusate sodium 100 mg oral capsule: 3 cap(s) orally once a day  famotidine 20 mg oral tablet: 1 tab(s) orally once a day (in the morning)  ferrous sulfate 325 mg (65 mg elemental iron) oral tablet: 1 tab(s) orally 2 times a day  Fleet Enema 19 g-7 g rectal enema: 133 milliliter(s) rectally as needed for  constipation  furosemide 40 mg oral tablet: 1 tab(s) orally 2 times a day  gabapentin 300 mg oral capsule: 1 cap(s) orally every 12 hours  HumuLIN R 100 units/mL injectable solution: Sliding scale  hydrocortisone 2.5% topical cream: Apply topically to affected area 2 times a day  isosorbide mononitrate 60 mg oral tablet, extended release: 1 tab(s) orally once a day  Keflex 500 mg oral capsule: 1 cap(s) orally 3 times a day  Lantus 100 units/mL subcutaneous solution: 15 unit(s) subcutaneously once a day  Lantus 100 units/mL subcutaneous solution: 7 unit(s) subcutaneously once a day (at bedtime)  losartan 100 mg oral tablet: 1 tab(s) orally once a day  Melatonin 5 mg oral tablet: 1 tab(s) orally once a day  metFORMIN 1000 mg oral tablet: 1 tab(s) orally 2 times a day with morning and evening meals  methIMAzole 10 mg oral tablet: 1 tab(s) orally once a day  metoclopramide 5 mg oral tablet: 1 tab(s) orally 3 times a day  metoprolol succinate 50 mg oral tablet, extended release: 1 tab(s) orally once a day  Milk of Magnesia 1200 mg/15 mL oral liquid: 30 milliliter(s) orally once a day as needed for  constipation  Movantik 25 mg oral tablet: 1 tab(s) orally once a day  Multiple Vitamins with Minerals oral tablet: 1 tab(s) orally once a day  oxyBUTYnin 10 mg/24 hr oral tablet, extended release: 1 tab(s) orally 2 times a day  oxyCODONE 10 mg oral tablet: 1 tab(s) orally 2 times a day  pantoprazole 40 mg oral delayed release tablet: 1 tab(s) orally once a day (before a meal)  polyethylene glycol 3350 oral powder for reconstitution: 17 gram(s) orally 2 times a day  Potassium Chloride (Eqv-Klor-Con M20) 20 mEq oral tablet, extended release: 1 tab(s) orally 2 times a day  Pyridium 100 mg oral tablet: 1 tab(s) orally 3 times a day as needed  saccharomyces boulardii lyo 250 mg oral capsule: 1 cap(s) orally 2 times a day  senna (sennosides) 8.6 mg oral tablet: 2 tab(s) orally once a day  sodium chloride 0.65% nasal spray: 1 spray(s) in each nostril once a day  sucralfate 1 g oral tablet: 1 tab(s) orally once a day  Vitamin C 1000 mg oral tablet: 1 tab(s) orally once a day   acetaminophen 325 mg oral tablet: 2 tab(s) orally every 6 hours As needed Temp greater or equal to 38C (100.4F), Mild Pain (1 - 3)  apixaban 5 mg oral tablet: 1 tab(s) orally 2 times a day  aspirin 81 mg oral tablet, chewable: 1 tab(s) orally once a day  atorvastatin 40 mg oral tablet: 1 tab(s) orally once a day  bisacodyl 10 mg rectal suppository: 1 suppository(ies) rectally once a day as needed for  constipation  bisacodyl 5 mg oral delayed release tablet: 2 tab(s) orally once a day  cephalexin 500 mg oral capsule: 1 cap(s) orally 3 times a day  cholecalciferol 1250 mcg (50,000 intl units) oral capsule: 1 cap(s) orally once a month on the 15th  clotrimazole-betamethasone dipropionate 1%-0.05% topical cream: Apply topically to affected area 2 times a day  cranberry oral capsule: 1 orally once a day  dilTIAZem 60 mg oral tablet: 1 tab(s) orally every 6 hours  docusate sodium 100 mg oral capsule: 3 cap(s) orally once a day  famotidine 20 mg oral tablet: 1 tab(s) orally once a day (in the morning)  ferrous sulfate 325 mg (65 mg elemental iron) oral tablet: 1 tab(s) orally 2 times a day  Fleet Enema 19 g-7 g rectal enema: 133 milliliter(s) rectally as needed for  constipation  gabapentin 300 mg oral capsule: 1 cap(s) orally every 12 hours  HumuLIN R 100 units/mL injectable solution: Sliding scale  hydrocortisone 2.5% topical cream: Apply topically to affected area 2 times a day  isosorbide mononitrate 30 mg oral tablet, extended release: 1 tab(s) orally once a day  Keflex 500 mg oral capsule: 1 cap(s) orally 3 times a day  Lantus 100 units/mL subcutaneous solution: 15 unit(s) subcutaneously once a day  Lantus 100 units/mL subcutaneous solution: 7 unit(s) subcutaneously once a day (at bedtime)  Lasix 40 mg oral tablet: 1 tab(s) orally once a day  losartan 25 mg oral tablet: 1 tab(s) orally once a day  Melatonin 5 mg oral tablet: 1 tab(s) orally once a day  metFORMIN 1000 mg oral tablet: 1 tab(s) orally 2 times a day with morning and evening meals  methIMAzole 10 mg oral tablet: 1 tab(s) orally once a day  metoclopramide 5 mg oral tablet: 1 tab(s) orally 3 times a day  metoprolol succinate 50 mg oral tablet, extended release: 1 tab(s) orally once a day  Milk of Magnesia 1200 mg/15 mL oral liquid: 30 milliliter(s) orally once a day as needed for  constipation  Movantik 25 mg oral tablet: 1 tab(s) orally once a day  Multiple Vitamins with Minerals oral tablet: 1 tab(s) orally once a day  oxyBUTYnin 10 mg/24 hr oral tablet, extended release: 1 tab(s) orally 2 times a day  oxyCODONE 10 mg oral tablet: 1 tab(s) orally 2 times a day  pantoprazole 40 mg oral delayed release tablet: 1 tab(s) orally once a day (before a meal)  polyethylene glycol 3350 oral powder for reconstitution: 17 gram(s) orally 2 times a day  Potassium Chloride (Eqv-Klor-Con M20) 20 mEq oral tablet, extended release: 1 tab(s) orally 2 times a day  Pyridium 100 mg oral tablet: 1 tab(s) orally 3 times a day as needed  saccharomyces boulardii lyo 250 mg oral capsule: 1 cap(s) orally 2 times a day  senna (sennosides) 8.6 mg oral tablet: 2 tab(s) orally once a day  sodium chloride 0.65% nasal spray: 1 spray(s) in each nostril once a day  sucralfate 1 g oral tablet: 1 tab(s) orally once a day  Vitamin C 1000 mg oral tablet: 1 tab(s) orally once a day

## 2023-12-23 NOTE — PROGRESS NOTE ADULT - SUBJECTIVE AND OBJECTIVE BOX
[facility]FECTIOUS DISEASE  Juventino Whitten MD PhD, Symone Hickey MD, Anne-Marie Li MD, Jeffery Jacobs MD, Ryan Romeo MD  and providing coverage with Melody Armstrong MD  Providing Infectious Disease Consultations at [facility], [facility], [facility]'s    Office# [phone] to schedule follow up appointments  Answering Service for urgent calls or New Consults [phone]  Cell# to text for urgent issues Juventino Whitten [phone]     infectious diseases progress note:    SARAH MORRISON is a 53y y. o. Male patient    Overnight and events of the last 24hrs reviewed    Allergies    No Known Drug Allergies  fish (Hives)    Intolerances        ANTIBIOTICS/RELEVANT:  antimicrobials  cefTRIAXone   IVPB 2000 milliGRAM(s) IV Intermittent every 24 hours    immunologic:    OTHER:  acetaminophen     Tablet .. 650 milliGRAM(s) Oral every 6 hours PRN  apixaban 5 milliGRAM(s) Oral every 12 hours  aspirin  chewable 81 milliGRAM(s) Oral daily  atorvastatin 40 milliGRAM(s) Oral at bedtime  bethanechol 25 milliGRAM(s) Oral three times a day  bisacodyl 5 milliGRAM(s) Oral every 12 hours PRN  cholecalciferol 1000 Unit(s) Oral daily  dextrose 50% Injectable 12.5 Gram(s) IV Push once  dextrose 50% Injectable 25 Gram(s) IV Push once  dextrose 50% Injectable 25 Gram(s) IV Push once  dextrose Oral Gel 15 Gram(s) Oral once  diltiazem    Tablet 60 milliGRAM(s) Oral every 6 hours  famotidine    Tablet 20 milliGRAM(s) Oral daily  furosemide    Tablet 40 milliGRAM(s) Oral two times a day  gabapentin 300 milliGRAM(s) Oral two times a day  glucagon  Injectable 1 milliGRAM(s) IntraMuscular once  insulin glargine Injectable (LANTUS) 10 Unit(s) SubCutaneous at bedtime  insulin lispro (ADMELOG) corrective regimen sliding scale   SubCutaneous three times a day before meals  insulin lispro (ADMELOG) corrective regimen sliding scale   SubCutaneous at bedtime  isosorbide   mononitrate ER Tablet (IMDUR) 60 milliGRAM(s) Oral daily  losartan 100 milliGRAM(s) Oral daily  magnesium hydroxide Suspension 30 milliLiter(s) Oral at bedtime PRN  melatonin 5 milliGRAM(s) Oral at bedtime  methimazole 10 milliGRAM(s) Oral daily  metoclopramide 5 milliGRAM(s) Oral three times a day  metoprolol tartrate 50 milliGRAM(s) Oral every 8 hours  multivitamin/minerals 1 Tablet(s) Oral daily  oxybutynin 10 milliGRAM(s) Oral two times a day  oxyCODONE  ER Tablet 10 milliGRAM(s) Oral every 12 hours  pantoprazole    Tablet 40 milliGRAM(s) Oral before breakfast  phenazopyridine 100 milliGRAM(s) Oral every 8 hours  polyethylene glycol 3350 17 Gram(s) Oral two times a day  potassium chloride    Tablet ER 10 milliEquivalent(s) Oral daily  saccharomyces boulardii 250 milliGRAM(s) Oral two times a day  senna 2 Tablet(s) Oral at bedtime  sodium chloride 0.65% Nasal 1 Spray(s) Both Nostrils three times a day PRN  sucralfate 1 Gram(s) Oral four times a day  tamsulosin 0.8 milliGRAM(s) Oral at bedtime      Objective:  Vital Signs Last 24 Hrs  T(C): 36.9 (23 Dec 2023 11:36), Max: 36.9 (23 Dec 2023 11:36)  T(F): 98.4 (23 Dec 2023 11:36), Max: 98.4 (23 Dec 2023 11:36)  HR: 95 (23 Dec 2023 11:36) (81 - 95)  BP: 124/78 (23 Dec 2023 11:36) (102/58 - 124/78)  BP(mean): --  RR: 18 (23 Dec 2023 11:36) (18 - 18)  SpO2: 96% (23 Dec 2023 11:36) (93% - 96%)    Parameters below as of 23 Dec 2023 11:36  Patient On (Oxygen Delivery Method): nasal cannula  O2 Flow (L/min): 2      T(C): 36.9 (12-23-23 @ 11:36), Max: 37.4 (12-21-23 @ 20:29)  T(C): 36.9 (12-23-23 @ 11:36), Max: 38.7 (12-21-23 @ 03:25)  T(C): 36.9 (12-23-23 @ 11:36), Max: 38.7 (12-21-23 @ 03:25)    PHYSICAL EXAM:  HEENT: NC atraumatic  Neck: supple  Respiratory: no accessory muscle use, breathing comfortably  Cardiovascular: distant  Gastrointestinal: normal appearing, nondistended  Extremities: no clubbing, no cyanosis,        LABS:                          10.7   8.66  )-----------( 119      ( 22 Dec 2023 07:08 )             33.0       WBC  8.66 12-22 @ 07:08  12.40 12-21 @ 04:45  6.32 12-20 @ 08:20      12-22    140  |  110<H>  |  20  ----------------------------<  189<H>  3.8   |  24  |  0.99    Ca    7.8<L>      22 Dec 2023 07:08    TPro  6.3  /  Alb  2.4<L>  /  TBili  0.4  /  DBili  x   /  AST  38<H>  /  ALT  24  /  AlkPhos  92  12-22      Creatinine: 0.99 mg/dL (12-22-23 @ 07:08)  Creatinine: 1.40 mg/dL (12-21-23 @ 04:45)  Creatinine: 1.10 mg/dL (12-20-23 @ 08:20)        Urinalysis Basic - ( 22 Dec 2023 07:08 )    Color: x / Appearance: x / SG: x / pH: x  Gluc: 189 mg/dL / Ketone: x  / Bili: x / Urobili: x   Blood: x / Protein: x / Nitrite: x   Leuk Esterase: x / RBC: x / WBC x   Sq Epi: x / Non Sq Epi: x / Bacteria: x            INFLAMMATORY MARKERS      MICROBIOLOGY:    Culture - Blood (12.21.23 @ 15:55)    Specimen Source: .Blood Blood-Venous   Culture Results:   No growth at 24 hours        RADIOLOGY & ADDITIONAL STUDIES:   [facility]OUS DISE[facility]  Juventino Whitten MD PhD, Symone Hickey MD, Anne-Marie Li MD, Jeffery Jacobs MD, Ryan Romeo MD  and providing coverage with Melody Armstrong MD  Providing Infectious Disease Consultations at [facility], [facility], [facility]'s    Office# [phone] to schedule follow up appointments  Answering Service for urgent calls or New Consults [phone]  Cell# to text for urgent issues Juventino Whitten [phone]     infectious diseases progress note:    SARAH MORRISON is a 53y y. o. Male patient    Overnight and events of the last 24hrs reviewed    Allergies    No Known Drug Allergies  fish (Hives)    Intolerances        ANTIBIOTICS/RELEVANT:  antimicrobials  cefTRIAXone   IVPB 2000 milliGRAM(s) IV Intermittent every 24 hours    immunologic:    OTHER:  acetaminophen     Tablet .. 650 milliGRAM(s) Oral every 6 hours PRN  apixaban 5 milliGRAM(s) Oral every 12 hours  aspirin  chewable 81 milliGRAM(s) Oral daily  atorvastatin 40 milliGRAM(s) Oral at bedtime  bethanechol 25 milliGRAM(s) Oral three times a day  bisacodyl 5 milliGRAM(s) Oral every 12 hours PRN  cholecalciferol 1000 Unit(s) Oral daily  dextrose 50% Injectable 12.5 Gram(s) IV Push once  dextrose 50% Injectable 25 Gram(s) IV Push once  dextrose 50% Injectable 25 Gram(s) IV Push once  dextrose Oral Gel 15 Gram(s) Oral once  diltiazem    Tablet 60 milliGRAM(s) Oral every 6 hours  famotidine    Tablet 20 milliGRAM(s) Oral daily  furosemide    Tablet 40 milliGRAM(s) Oral two times a day  gabapentin 300 milliGRAM(s) Oral two times a day  glucagon  Injectable 1 milliGRAM(s) IntraMuscular once  insulin glargine Injectable (LANTUS) 10 Unit(s) SubCutaneous at bedtime  insulin lispro (ADMELOG) corrective regimen sliding scale   SubCutaneous three times a day before meals  insulin lispro (ADMELOG) corrective regimen sliding scale   SubCutaneous at bedtime  isosorbide   mononitrate ER Tablet (IMDUR) 60 milliGRAM(s) Oral daily  losartan 100 milliGRAM(s) Oral daily  magnesium hydroxide Suspension 30 milliLiter(s) Oral at bedtime PRN  melatonin 5 milliGRAM(s) Oral at bedtime  methimazole 10 milliGRAM(s) Oral daily  metoclopramide 5 milliGRAM(s) Oral three times a day  metoprolol tartrate 50 milliGRAM(s) Oral every 8 hours  multivitamin/minerals 1 Tablet(s) Oral daily  oxybutynin 10 milliGRAM(s) Oral two times a day  oxyCODONE  ER Tablet 10 milliGRAM(s) Oral every 12 hours  pantoprazole    Tablet 40 milliGRAM(s) Oral before breakfast  phenazopyridine 100 milliGRAM(s) Oral every 8 hours  polyethylene glycol 3350 17 Gram(s) Oral two times a day  potassium chloride    Tablet ER 10 milliEquivalent(s) Oral daily  saccharomyces boulardii 250 milliGRAM(s) Oral two times a day  senna 2 Tablet(s) Oral at bedtime  sodium chloride 0.65% Nasal 1 Spray(s) Both Nostrils three times a day PRN  sucralfate 1 Gram(s) Oral four times a day  tamsulosin 0.8 milliGRAM(s) Oral at bedtime      Objective:  Vital Signs Last 24 Hrs  T(C): 36.9 (23 Dec 2023 11:36), Max: 36.9 (23 Dec 2023 11:36)  T(F): 98.4 (23 Dec 2023 11:36), Max: 98.4 (23 Dec 2023 11:36)  HR: 95 (23 Dec 2023 11:36) (81 - 95)  BP: 124/78 (23 Dec 2023 11:36) (102/58 - 124/78)  BP(mean): --  RR: 18 (23 Dec 2023 11:36) (18 - 18)  SpO2: 96% (23 Dec 2023 11:36) (93% - 96%)    Parameters below as of 23 Dec 2023 11:36  Patient On (Oxygen Delivery Method): nasal cannula  O2 Flow (L/min): 2      T(C): 36.9 (12-23-23 @ 11:36), Max: 37.4 (12-21-23 @ 20:29)  T(C): 36.9 (12-23-23 @ 11:36), Max: 38.7 (12-21-23 @ 03:25)  T(C): 36.9 (12-23-23 @ 11:36), Max: 38.7 (12-21-23 @ 03:25)    PHYSICAL EXAM:  HEENT: NC atraumatic  Neck: supple  Respiratory: no accessory muscle use, breathing comfortably  Cardiovascular: distant  Gastrointestinal: normal appearing, nondistended  Extremities: no clubbing, no cyanosis,        LABS:                          10.7   8.66  )-----------( 119      ( 22 Dec 2023 07:08 )             33.0       WBC  8.66 12-22 @ 07:08  12.40 12-21 @ 04:45  6.32 12-20 @ 08:20      12-22    140  |  110<H>  |  20  ----------------------------<  189<H>  3.8   |  24  |  0.99    Ca    7.8<L>      22 Dec 2023 07:08    TPro  6.3  /  Alb  2.4<L>  /  TBili  0.4  /  DBili  x   /  AST  38<H>  /  ALT  24  /  AlkPhos  92  12-22      Creatinine: 0.99 mg/dL (12-22-23 @ 07:08)  Creatinine: 1.40 mg/dL (12-21-23 @ 04:45)  Creatinine: 1.10 mg/dL (12-20-23 @ 08:20)        Urinalysis Basic - ( 22 Dec 2023 07:08 )    Color: x / Appearance: x / SG: x / pH: x  Gluc: 189 mg/dL / Ketone: x  / Bili: x / Urobili: x   Blood: x / Protein: x / Nitrite: x   Leuk Esterase: x / RBC: x / WBC x   Sq Epi: x / Non Sq Epi: x / Bacteria: x            INFLAMMATORY MARKERS      MICROBIOLOGY:    Culture - Blood (12.21.23 @ 15:55)    Specimen Source: .Blood Blood-Venous   Culture Results:   No growth at 24 hours        RADIOLOGY & ADDITIONAL STUDIES:

## 2023-12-23 NOTE — PROGRESS NOTE ADULT - SUBJECTIVE AND OBJECTIVE BOX
Date/Time Patient Seen:  		  Referring MD:   Data Reviewed	       Patient is a 53y old  Male who presents with a chief complaint of UTI (22 Dec 2023 13:14)      Subjective/HPI     PAST MEDICAL & SURGICAL HISTORY:  No pertinent past medical history    Diabetes    No pertinent past medical history    Diabetes mellitus with no complication    Afib    Hypertension    BPH (benign prostatic hyperplasia)    Perforated gastric ulcer  s/p emergent ex-lap omentopexy and plication 6/2019    Pulmonary embolism    History of non-ST elevation myocardial infarction (NSTEMI)    Osteomyelitis  s/p debridement    CAD S/P percutaneous coronary angioplasty    Cerebrovascular accident    No significant past surgical history    No significant past surgical history    H/O abdominal surgery    Perforated gastric ulcer    Traumatic amputation of left foot, initial encounter          Medication list         MEDICATIONS  (STANDING):  apixaban 5 milliGRAM(s) Oral every 12 hours  aspirin  chewable 81 milliGRAM(s) Oral daily  atorvastatin 40 milliGRAM(s) Oral at bedtime  bethanechol 25 milliGRAM(s) Oral three times a day  cefTRIAXone   IVPB 2000 milliGRAM(s) IV Intermittent every 24 hours  cholecalciferol 1000 Unit(s) Oral daily  dextrose 50% Injectable 12.5 Gram(s) IV Push once  dextrose 50% Injectable 25 Gram(s) IV Push once  dextrose 50% Injectable 25 Gram(s) IV Push once  dextrose Oral Gel 15 Gram(s) Oral once  diltiazem    Tablet 60 milliGRAM(s) Oral every 6 hours  famotidine    Tablet 20 milliGRAM(s) Oral daily  furosemide    Tablet 40 milliGRAM(s) Oral two times a day  gabapentin 300 milliGRAM(s) Oral two times a day  glucagon  Injectable 1 milliGRAM(s) IntraMuscular once  insulin glargine Injectable (LANTUS) 10 Unit(s) SubCutaneous at bedtime  insulin lispro (ADMELOG) corrective regimen sliding scale   SubCutaneous three times a day before meals  insulin lispro (ADMELOG) corrective regimen sliding scale   SubCutaneous at bedtime  isosorbide   mononitrate ER Tablet (IMDUR) 60 milliGRAM(s) Oral daily  losartan 100 milliGRAM(s) Oral daily  melatonin 5 milliGRAM(s) Oral at bedtime  methimazole 10 milliGRAM(s) Oral daily  metoclopramide 5 milliGRAM(s) Oral three times a day  metoprolol tartrate 50 milliGRAM(s) Oral every 8 hours  multivitamin/minerals 1 Tablet(s) Oral daily  oxybutynin 10 milliGRAM(s) Oral two times a day  pantoprazole    Tablet 40 milliGRAM(s) Oral before breakfast  phenazopyridine 100 milliGRAM(s) Oral every 8 hours  polyethylene glycol 3350 17 Gram(s) Oral two times a day  potassium chloride    Tablet ER 10 milliEquivalent(s) Oral daily  saccharomyces boulardii 250 milliGRAM(s) Oral two times a day  senna 2 Tablet(s) Oral at bedtime  sodium chloride 0.9%. 1000 milliLiter(s) (75 mL/Hr) IV Continuous <Continuous>  sucralfate 1 Gram(s) Oral four times a day  tamsulosin 0.8 milliGRAM(s) Oral at bedtime    MEDICATIONS  (PRN):  acetaminophen     Tablet .. 650 milliGRAM(s) Oral every 6 hours PRN Temp greater or equal to 38C (100.4F), Mild Pain (1 - 3)  bisacodyl 5 milliGRAM(s) Oral every 12 hours PRN Constipation  magnesium hydroxide Suspension 30 milliLiter(s) Oral at bedtime PRN Constipation  sodium chloride 0.65% Nasal 1 Spray(s) Both Nostrils three times a day PRN Nasal Congestion         Vitals log        ICU Vital Signs Last 24 Hrs  T(C): 36.2 (23 Dec 2023 04:45), Max: 36.8 (22 Dec 2023 20:27)  T(F): 97.1 (23 Dec 2023 04:45), Max: 98.2 (22 Dec 2023 20:27)  HR: 90 (23 Dec 2023 04:45) (81 - 90)  BP: 102/58 (23 Dec 2023 04:45) (102/58 - 123/69)  BP(mean): --  ABP: --  ABP(mean): --  RR: 18 (23 Dec 2023 04:45) (18 - 18)  SpO2: 93% (23 Dec 2023 04:45) (92% - 93%)    O2 Parameters below as of 23 Dec 2023 04:45  Patient On (Oxygen Delivery Method): nasal cannula                 Input and Output:  I&O's Detail    21 Dec 2023 07:01  -  22 Dec 2023 07:00  --------------------------------------------------------  IN:    Diltiazem: 40 mL    Diltiazem: 80 mL    sodium chloride 0.9%: 900 mL  Total IN: 1020 mL    OUT:    Indwelling Catheter - Urethral (mL): 1300 mL  Total OUT: 1300 mL    Total NET: -280 mL          Lab Data                        10.7   8.66  )-----------( 119      ( 22 Dec 2023 07:08 )             33.0     12-22    140  |  110<H>  |  20  ----------------------------<  189<H>  3.8   |  24  |  0.99    Ca    7.8<L>      22 Dec 2023 07:08    TPro  6.3  /  Alb  2.4<L>  /  TBili  0.4  /  DBili  x   /  AST  38<H>  /  ALT  24  /  AlkPhos  92  12-22            Review of Systems	      Objective     Physical Examination    heart s1s2  lung dec BS  head nc      Pertinent Lab findings & Imaging      Ale:  NO   Adequate UO     I&O's Detail    21 Dec 2023 07:01  -  22 Dec 2023 07:00  --------------------------------------------------------  IN:    Diltiazem: 40 mL    Diltiazem: 80 mL    sodium chloride 0.9%: 900 mL  Total IN: 1020 mL    OUT:    Indwelling Catheter - Urethral (mL): 1300 mL  Total OUT: 1300 mL    Total NET: -280 mL               Discussed with:     Cultures:	        Radiology

## 2023-12-23 NOTE — PROGRESS NOTE ADULT - SUBJECTIVE AND OBJECTIVE BOX
[facility] -- Brittany Lutz Pannella, Patel, Savella, Goodger, Cohen  Office # [phone]    Follow Up:  Afib     Subjective/Observations: Patient seen and examined, awake, alert, resting comfortably in bed, denies chest pain, dyspnea, palpitations or dizziness, orthopnea and PND. Tolerating room air. no events overnight     REVIEW OF SYSTEMS: All other review of systems is negative unless indicated above  PAST MEDICAL & SURGICAL HISTORY:  Diabetes      Diabetes mellitus with no complication      Afib      Hypertension      BPH (benign prostatic hyperplasia)      Perforated gastric ulcer  s/p emergent ex-lap omentopexy and plication 6/2019      Pulmonary embolism      History of non-ST elevation myocardial infarction (NSTEMI)      Osteomyelitis  s/p debridement      CAD S/P percutaneous coronary angioplasty      Cerebrovascular accident      H/O abdominal surgery      Perforated gastric ulcer      Traumatic amputation of left foot, initial encounter        MEDICATIONS  (STANDING):  apixaban 5 milliGRAM(s) Oral every 12 hours  aspirin  chewable 81 milliGRAM(s) Oral daily  atorvastatin 40 milliGRAM(s) Oral at bedtime  bethanechol 25 milliGRAM(s) Oral three times a day  cefTRIAXone   IVPB 2000 milliGRAM(s) IV Intermittent every 24 hours  cholecalciferol 1000 Unit(s) Oral daily  dextrose 50% Injectable 25 Gram(s) IV Push once  dextrose 50% Injectable 25 Gram(s) IV Push once  dextrose 50% Injectable 12.5 Gram(s) IV Push once  dextrose Oral Gel 15 Gram(s) Oral once  diltiazem    Tablet 60 milliGRAM(s) Oral every 6 hours  famotidine    Tablet 20 milliGRAM(s) Oral daily  furosemide    Tablet 40 milliGRAM(s) Oral two times a day  gabapentin 300 milliGRAM(s) Oral two times a day  glucagon  Injectable 1 milliGRAM(s) IntraMuscular once  insulin glargine Injectable (LANTUS) 10 Unit(s) SubCutaneous at bedtime  insulin lispro (ADMELOG) corrective regimen sliding scale   SubCutaneous three times a day before meals  insulin lispro (ADMELOG) corrective regimen sliding scale   SubCutaneous at bedtime  isosorbide   mononitrate ER Tablet (IMDUR) 60 milliGRAM(s) Oral daily  losartan 100 milliGRAM(s) Oral daily  melatonin 5 milliGRAM(s) Oral at bedtime  methimazole 10 milliGRAM(s) Oral daily  metoclopramide 5 milliGRAM(s) Oral three times a day  metoprolol tartrate 50 milliGRAM(s) Oral every 8 hours  multivitamin/minerals 1 Tablet(s) Oral daily  oxybutynin 10 milliGRAM(s) Oral two times a day  oxyCODONE  ER Tablet 10 milliGRAM(s) Oral every 12 hours  pantoprazole    Tablet 40 milliGRAM(s) Oral before breakfast  phenazopyridine 100 milliGRAM(s) Oral every 8 hours  polyethylene glycol 3350 17 Gram(s) Oral two times a day  potassium chloride    Tablet ER 10 milliEquivalent(s) Oral daily  saccharomyces boulardii 250 milliGRAM(s) Oral two times a day  senna 2 Tablet(s) Oral at bedtime  sucralfate 1 Gram(s) Oral four times a day  tamsulosin 0.8 milliGRAM(s) Oral at bedtime    MEDICATIONS  (PRN):  acetaminophen     Tablet .. 650 milliGRAM(s) Oral every 6 hours PRN Temp greater or equal to 38C (100.4F), Mild Pain (1 - 3)  bisacodyl 5 milliGRAM(s) Oral every 12 hours PRN Constipation  magnesium hydroxide Suspension 30 milliLiter(s) Oral at bedtime PRN Constipation  sodium chloride 0.65% Nasal 1 Spray(s) Both Nostrils three times a day PRN Nasal Congestion    Allergies    No Known Drug Allergies  fish (Hives)    Intolerances      Vital Signs Last 24 Hrs  T(C): 36.9 (23 Dec 2023 11:36), Max: 36.9 (23 Dec 2023 11:36)  T(F): 98.4 (23 Dec 2023 11:36), Max: 98.4 (23 Dec 2023 11:36)  HR: 95 (23 Dec 2023 11:36) (81 - 95)  BP: 124/78 (23 Dec 2023 11:36) (102/58 - 124/78)  BP(mean): --  RR: 18 (23 Dec 2023 11:36) (18 - 18)  SpO2: 96% (23 Dec 2023 11:36) (93% - 96%)    Parameters below as of 23 Dec 2023 11:36  Patient On (Oxygen Delivery Method): nasal cannula  O2 Flow (L/min): 2    I&O's Summary    22 Dec 2023 07:01  -  23 Dec 2023 07:00  --------------------------------------------------------  IN: 75 mL / OUT: 2400 mL / NET: -2325 mL    23 Dec 2023 07:01  -  23 Dec 2023 12:41  --------------------------------------------------------  IN: 150 mL / OUT: 0 mL / NET: 150 mL          TELE: AFib 70's   PHYSICAL EXAM:  Constitutional: NAD, awake and alert  HEENT: Moist Mucous Membranes, Anicteric  Pulmonary: Non-labored, breath sounds are clear bilaterally, No wheezing, rales or rhonchi  Cardiovascular: IRRR, S1 and S2, No murmurs, rubs, gallops or clicks  Gastrointestinal: Bowel Sounds present, soft, nontender.   Lymph: No peripheral edema. No lymphadenopathy.  Skin: No visible rashes or ulcers.  Psych:  Mood & affect appropriate  LABS: All Labs Reviewed:                        10.7   8.66  )-----------( 119      ( 22 Dec 2023 07:08 )             33.0                         11.6   12.40 )-----------( 142      ( 21 Dec 2023 04:45 )             36.1     22 Dec 2023 07:08    140    |  110    |  20     ----------------------------<  189    3.8     |  24     |  0.99   21 Dec 2023 04:45    139    |  107    |  24     ----------------------------<  149    4.4     |  27     |  1.40     Ca    7.8        22 Dec 2023 07:08  Ca    7.9        21 Dec 2023 04:45    TPro  6.3    /  Alb  2.4    /  TBili  0.4    /  DBili  x      /  AST  38     /  ALT  24     /  AlkPhos  92     22 Dec 2023 07:08          12 Lead ECG:   Ventricular Rate 164 BPM    QRS Duration 78 ms    Q-T Interval 232 ms    QTC Calculation(Bazett) 383 ms    R Axis 3 degrees    T Axis 60 degrees    Diagnosis Line *** Critical Test Result: High HR  Atrial fibrillation with rapid ventricular response  Low voltage QRS  Abnormal ECG  When compared with ECG of 16-JUN-2021 08:29,  Atrial fibrillation has replaced Sinus rhythm  Vent. rate has increased BY  97 BPM  Nonspecific T wave abnormality now evident in Lateral leads  Confirmed by Jovanni Soto MD ([id number]) on 12/20/2023 2:27:15 PM (12-20-23 @ 08:11)       EXAM:  ECHO TTE WO CON COMP W DOPPLR         PROCEDURE DATE:  12/24/2019        INTERPRETATION:  INDICATION: Coronary artery disease    Blood Pressure 117/71    Height 187     Weight 93       BSA 2.2    Dimensions:    LA 4.2       Normal Values: 2.0 - 4.0 cm    Ao 4.0        Normal Values: 2.0 - 3.8 cm  SEPTUM 1.6       Normal Values: 0.6 - 1.2 cm  PWT 1.6       Normal Values: 0.6 - 1.1 cm  LVIDd 5.8         Normal Values: 3.0 - 5.6 cm  LVIDs 3.2         Normal Values: 1.8 - 4.0 cm    Derived Variables:  LVMI g/m2  RWT  Fractional Short  Ejection Fraction    Doppler Peak v. AoV= (m/sec)    OBSERVATIONS:    Mitral Valve: normal, mild MR.  Aortic Valve/Aorta: normal trileaflet aortic valve.  Tricuspid Valve: normal with trace TR.  Pulmonic Valve: normal  Left Atrium: Enlarged  Right Atrium: normal  Left Ventricle: Severe concentric LVH with normal systolic function, estimated LVEF of 65%.  Right Ventricle: normal size and systolic function.  Pericardium/Pleura: no significant pleural effusion, no significant pericardial effusion.  Pulmonary/RV Pressure: Inadequate TR jet to estimate PA systolic pressure  LV Diastolic Function: Grade 2diastolic dysfunction.    Severe concentric LVH, and mild LV enlargement, with normal systolic function, estimated LVEF of 65%. Normal right ventricular size and systolic function. Grade 2diastolic dysfunction. Left atrial enlargement The aortic root is normal in size. The mitral valve is structurally normal, mild MR. The aortic valve is trileaflet without stenosis or insufficiency. Trace physiologic TR. Inadequate TR jet to estimate PA systolic pressure No significant pericardial effusion.                  JOVANNI SOTO M.D., ATTENDING CARDIOLOGIST  This document has been electronically signed. Dec 25 2019 11:45AM                  [facility] -- Brittany Lutz Pannella, Patel, Savella, Goodger, Cohen  Office # [phone]    Follow Up:  Afib     Subjective/Observations: Patient seen and examined, awake, alert, resting comfortably in bed, denies chest pain, dyspnea, palpitations or dizziness, orthopnea and PND. Tolerating room air. no events overnight     REVIEW OF SYSTEMS: All other review of systems is negative unless indicated above  PAST MEDICAL & SURGICAL HISTORY:  Diabetes      Diabetes mellitus with no complication      Afib      Hypertension      BPH (benign prostatic hyperplasia)      Perforated gastric ulcer  s/p emergent ex-lap omentopexy and plication 6/2019      Pulmonary embolism      History of non-ST elevation myocardial infarction (NSTEMI)      Osteomyelitis  s/p debridement      CAD S/P percutaneous coronary angioplasty      Cerebrovascular accident      H/O abdominal surgery      Perforated gastric ulcer      Traumatic amputation of left foot, initial encounter        MEDICATIONS  (STANDING):  apixaban 5 milliGRAM(s) Oral every 12 hours  aspirin  chewable 81 milliGRAM(s) Oral daily  atorvastatin 40 milliGRAM(s) Oral at bedtime  bethanechol 25 milliGRAM(s) Oral three times a day  cefTRIAXone   IVPB 2000 milliGRAM(s) IV Intermittent every 24 hours  cholecalciferol 1000 Unit(s) Oral daily  dextrose 50% Injectable 25 Gram(s) IV Push once  dextrose 50% Injectable 25 Gram(s) IV Push once  dextrose 50% Injectable 12.5 Gram(s) IV Push once  dextrose Oral Gel 15 Gram(s) Oral once  diltiazem    Tablet 60 milliGRAM(s) Oral every 6 hours  famotidine    Tablet 20 milliGRAM(s) Oral daily  furosemide    Tablet 40 milliGRAM(s) Oral two times a day  gabapentin 300 milliGRAM(s) Oral two times a day  glucagon  Injectable 1 milliGRAM(s) IntraMuscular once  insulin glargine Injectable (LANTUS) 10 Unit(s) SubCutaneous at bedtime  insulin lispro (ADMELOG) corrective regimen sliding scale   SubCutaneous three times a day before meals  insulin lispro (ADMELOG) corrective regimen sliding scale   SubCutaneous at bedtime  isosorbide   mononitrate ER Tablet (IMDUR) 60 milliGRAM(s) Oral daily  losartan 100 milliGRAM(s) Oral daily  melatonin 5 milliGRAM(s) Oral at bedtime  methimazole 10 milliGRAM(s) Oral daily  metoclopramide 5 milliGRAM(s) Oral three times a day  metoprolol tartrate 50 milliGRAM(s) Oral every 8 hours  multivitamin/minerals 1 Tablet(s) Oral daily  oxybutynin 10 milliGRAM(s) Oral two times a day  oxyCODONE  ER Tablet 10 milliGRAM(s) Oral every 12 hours  pantoprazole    Tablet 40 milliGRAM(s) Oral before breakfast  phenazopyridine 100 milliGRAM(s) Oral every 8 hours  polyethylene glycol 3350 17 Gram(s) Oral two times a day  potassium chloride    Tablet ER 10 milliEquivalent(s) Oral daily  saccharomyces boulardii 250 milliGRAM(s) Oral two times a day  senna 2 Tablet(s) Oral at bedtime  sucralfate 1 Gram(s) Oral four times a day  tamsulosin 0.8 milliGRAM(s) Oral at bedtime    MEDICATIONS  (PRN):  acetaminophen     Tablet .. 650 milliGRAM(s) Oral every 6 hours PRN Temp greater or equal to 38C (100.4F), Mild Pain (1 - 3)  bisacodyl 5 milliGRAM(s) Oral every 12 hours PRN Constipation  magnesium hydroxide Suspension 30 milliLiter(s) Oral at bedtime PRN Constipation  sodium chloride 0.65% Nasal 1 Spray(s) Both Nostrils three times a day PRN Nasal Congestion    Allergies    No Known Drug Allergies  fish (Hives)    Intolerances      Vital Signs Last 24 Hrs  T(C): 36.9 (23 Dec 2023 11:36), Max: 36.9 (23 Dec 2023 11:36)  T(F): 98.4 (23 Dec 2023 11:36), Max: 98.4 (23 Dec 2023 11:36)  HR: 95 (23 Dec 2023 11:36) (81 - 95)  BP: 124/78 (23 Dec 2023 11:36) (102/58 - 124/78)  BP(mean): --  RR: 18 (23 Dec 2023 11:36) (18 - 18)  SpO2: 96% (23 Dec 2023 11:36) (93% - 96%)    Parameters below as of 23 Dec 2023 11:36  Patient On (Oxygen Delivery Method): nasal cannula  O2 Flow (L/min): 2    I&O's Summary    22 Dec 2023 07:01  -  23 Dec 2023 07:00  --------------------------------------------------------  IN: 75 mL / OUT: 2400 mL / NET: -2325 mL    23 Dec 2023 07:01  -  23 Dec 2023 12:41  --------------------------------------------------------  IN: 150 mL / OUT: 0 mL / NET: 150 mL          TELE: AFib 70's   PHYSICAL EXAM:  Constitutional: NAD, awake and alert  HEENT: Moist Mucous Membranes, Anicteric  Pulmonary: Non-labored, breath sounds are clear bilaterally, No wheezing, rales or rhonchi  Cardiovascular: IRRR, S1 and S2, No murmurs, rubs, gallops or clicks  Gastrointestinal: Bowel Sounds present, soft, nontender.   Lymph: No peripheral edema. No lymphadenopathy.  Skin: No visible rashes or ulcers.  Psych:  Mood & affect appropriate  LABS: All Labs Reviewed:                        10.7   8.66  )-----------( 119      ( 22 Dec 2023 07:08 )             33.0                         11.6   12.40 )-----------( 142      ( 21 Dec 2023 04:45 )             36.1     22 Dec 2023 07:08    140    |  110    |  20     ----------------------------<  189    3.8     |  24     |  0.99   21 Dec 2023 04:45    139    |  107    |  24     ----------------------------<  149    4.4     |  27     |  1.40     Ca    7.8        22 Dec 2023 07:08  Ca    7.9        21 Dec 2023 04:45    TPro  6.3    /  Alb  2.4    /  TBili  0.4    /  DBili  x      /  AST  38     /  ALT  24     /  AlkPhos  92     22 Dec 2023 07:08          12 Lead ECG:   Ventricular Rate 164 BPM    QRS Duration 78 ms    Q-T Interval 232 ms    QTC Calculation(Bazett) 383 ms    R Axis 3 degrees    T Axis 60 degrees    Diagnosis Line *** Critical Test Result: High HR  Atrial fibrillation with rapid ventricular response  Low voltage QRS  Abnormal ECG  When compared with ECG of 16-JUN-2021 08:29,  Atrial fibrillation has replaced Sinus rhythm  Vent. rate has increased BY  97 BPM  Nonspecific T wave abnormality now evident in Lateral leads  Confirmed by Jovanni Soto MD ([id number]) on 12/20/2023 2:27:15 PM (12-20-23 @ 08:11)       EXAM:  ECHO TTE WO CON COMP W DOPPLR         PROCEDURE DATE:  12/24/2019        INTERPRETATION:  INDICATION: Coronary artery disease    Blood Pressure 117/71    Height 187     Weight 93       BSA 2.2    Dimensions:    LA 4.2       Normal Values: 2.0 - 4.0 cm    Ao 4.0        Normal Values: 2.0 - 3.8 cm  SEPTUM 1.6       Normal Values: 0.6 - 1.2 cm  PWT 1.6       Normal Values: 0.6 - 1.1 cm  LVIDd 5.8         Normal Values: 3.0 - 5.6 cm  LVIDs 3.2         Normal Values: 1.8 - 4.0 cm    Derived Variables:  LVMI g/m2  RWT  Fractional Short  Ejection Fraction    Doppler Peak v. AoV= (m/sec)    OBSERVATIONS:    Mitral Valve: normal, mild MR.  Aortic Valve/Aorta: normal trileaflet aortic valve.  Tricuspid Valve: normal with trace TR.  Pulmonic Valve: normal  Left Atrium: Enlarged  Right Atrium: normal  Left Ventricle: Severe concentric LVH with normal systolic function, estimated LVEF of 65%.  Right Ventricle: normal size and systolic function.  Pericardium/Pleura: no significant pleural effusion, no significant pericardial effusion.  Pulmonary/RV Pressure: Inadequate TR jet to estimate PA systolic pressure  LV Diastolic Function: Grade 2diastolic dysfunction.    Severe concentric LVH, and mild LV enlargement, with normal systolic function, estimated LVEF of 65%. Normal right ventricular size and systolic function. Grade 2diastolic dysfunction. Left atrial enlargement The aortic root is normal in size. The mitral valve is structurally normal, mild MR. The aortic valve is trileaflet without stenosis or insufficiency. Trace physiologic TR. Inadequate TR jet to estimate PA systolic pressure No significant pericardial effusion.                  JOVANNI SOTO M.D., ATTENDING CARDIOLOGIST  This document has been electronically signed. Dec 25 2019 11:45AM

## 2023-12-24 LAB
GLUCOSE BLDC GLUCOMTR-MCNC: 189 MG/DL — HIGH (ref 70–99)
GLUCOSE BLDC GLUCOMTR-MCNC: 189 MG/DL — HIGH (ref 70–99)
GLUCOSE BLDC GLUCOMTR-MCNC: 197 MG/DL — HIGH (ref 70–99)
GLUCOSE BLDC GLUCOMTR-MCNC: 197 MG/DL — HIGH (ref 70–99)
GLUCOSE BLDC GLUCOMTR-MCNC: 205 MG/DL — HIGH (ref 70–99)
GLUCOSE BLDC GLUCOMTR-MCNC: 205 MG/DL — HIGH (ref 70–99)
GLUCOSE BLDC GLUCOMTR-MCNC: 221 MG/DL — HIGH (ref 70–99)
GLUCOSE BLDC GLUCOMTR-MCNC: 221 MG/DL — HIGH (ref 70–99)
GLUCOSE BLDC GLUCOMTR-MCNC: 225 MG/DL — HIGH (ref 70–99)
GLUCOSE BLDC GLUCOMTR-MCNC: 225 MG/DL — HIGH (ref 70–99)

## 2023-12-24 PROCEDURE — 99232 SBSQ HOSP IP/OBS MODERATE 35: CPT

## 2023-12-24 RX ORDER — INSULIN LISPRO 100/ML
5 VIAL (ML) SUBCUTANEOUS
Refills: 0 | Status: DISCONTINUED | OUTPATIENT
Start: 2023-12-24 | End: 2023-12-26

## 2023-12-24 RX ORDER — CEPHALEXIN 500 MG
500 CAPSULE ORAL
Refills: 0 | Status: DISCONTINUED | OUTPATIENT
Start: 2023-12-24 | End: 2023-12-26

## 2023-12-24 RX ORDER — INSULIN GLARGINE 100 [IU]/ML
15 INJECTION, SOLUTION SUBCUTANEOUS AT BEDTIME
Refills: 0 | Status: DISCONTINUED | OUTPATIENT
Start: 2023-12-24 | End: 2023-12-26

## 2023-12-24 RX ADMIN — INSULIN GLARGINE 15 UNIT(S): 100 INJECTION, SOLUTION SUBCUTANEOUS at 23:03

## 2023-12-24 RX ADMIN — Medication 25 MILLIGRAM(S): at 23:02

## 2023-12-24 RX ADMIN — TAMSULOSIN HYDROCHLORIDE 0.8 MILLIGRAM(S): 0.4 CAPSULE ORAL at 23:01

## 2023-12-24 RX ADMIN — Medication 5 MILLIGRAM(S): at 23:01

## 2023-12-24 RX ADMIN — Medication 5 MILLIGRAM(S): at 14:39

## 2023-12-24 RX ADMIN — Medication 60 MILLIGRAM(S): at 19:04

## 2023-12-24 RX ADMIN — POLYETHYLENE GLYCOL 3350 17 GRAM(S): 17 POWDER, FOR SOLUTION ORAL at 05:08

## 2023-12-24 RX ADMIN — APIXABAN 5 MILLIGRAM(S): 2.5 TABLET, FILM COATED ORAL at 05:08

## 2023-12-24 RX ADMIN — GABAPENTIN 300 MILLIGRAM(S): 400 CAPSULE ORAL at 18:45

## 2023-12-24 RX ADMIN — OXYCODONE HYDROCHLORIDE 10 MILLIGRAM(S): 5 TABLET ORAL at 18:45

## 2023-12-24 RX ADMIN — Medication 4: at 08:45

## 2023-12-24 RX ADMIN — SENNA PLUS 2 TABLET(S): 8.6 TABLET ORAL at 23:00

## 2023-12-24 RX ADMIN — Medication 500 MILLIGRAM(S): at 23:01

## 2023-12-24 RX ADMIN — Medication 500 MILLIGRAM(S): at 14:31

## 2023-12-24 RX ADMIN — Medication 0: at 23:03

## 2023-12-24 RX ADMIN — Medication 5 MILLIGRAM(S): at 09:07

## 2023-12-24 RX ADMIN — Medication 60 MILLIGRAM(S): at 05:10

## 2023-12-24 RX ADMIN — Medication 1 TABLET(S): at 14:30

## 2023-12-24 RX ADMIN — Medication 60 MILLIGRAM(S): at 23:01

## 2023-12-24 RX ADMIN — GABAPENTIN 300 MILLIGRAM(S): 400 CAPSULE ORAL at 05:24

## 2023-12-24 RX ADMIN — Medication 25 MILLIGRAM(S): at 14:38

## 2023-12-24 RX ADMIN — Medication 10 MILLIEQUIVALENT(S): at 14:28

## 2023-12-24 RX ADMIN — OXYCODONE HYDROCHLORIDE 10 MILLIGRAM(S): 5 TABLET ORAL at 19:20

## 2023-12-24 RX ADMIN — LOSARTAN POTASSIUM 100 MILLIGRAM(S): 100 TABLET, FILM COATED ORAL at 05:09

## 2023-12-24 RX ADMIN — APIXABAN 5 MILLIGRAM(S): 2.5 TABLET, FILM COATED ORAL at 18:55

## 2023-12-24 RX ADMIN — Medication 5 MILLIGRAM(S): at 23:00

## 2023-12-24 RX ADMIN — Medication 40 MILLIGRAM(S): at 05:10

## 2023-12-24 RX ADMIN — Medication 10 MILLIGRAM(S): at 05:09

## 2023-12-24 RX ADMIN — Medication 100 MILLIGRAM(S): at 14:39

## 2023-12-24 RX ADMIN — Medication 1 GRAM(S): at 23:02

## 2023-12-24 RX ADMIN — Medication 5 UNIT(S): at 17:32

## 2023-12-24 RX ADMIN — Medication 81 MILLIGRAM(S): at 14:29

## 2023-12-24 RX ADMIN — Medication 100 MILLIGRAM(S): at 05:09

## 2023-12-24 RX ADMIN — Medication 1 GRAM(S): at 05:08

## 2023-12-24 RX ADMIN — FAMOTIDINE 20 MILLIGRAM(S): 10 INJECTION INTRAVENOUS at 14:28

## 2023-12-24 RX ADMIN — Medication 250 MILLIGRAM(S): at 18:46

## 2023-12-24 RX ADMIN — Medication 500 MILLIGRAM(S): at 19:04

## 2023-12-24 RX ADMIN — PANTOPRAZOLE SODIUM 40 MILLIGRAM(S): 20 TABLET, DELAYED RELEASE ORAL at 05:09

## 2023-12-24 RX ADMIN — Medication 40 MILLIGRAM(S): at 14:38

## 2023-12-24 RX ADMIN — Medication 2: at 17:32

## 2023-12-24 RX ADMIN — POLYETHYLENE GLYCOL 3350 17 GRAM(S): 17 POWDER, FOR SOLUTION ORAL at 18:46

## 2023-12-24 RX ADMIN — Medication 1000 UNIT(S): at 14:29

## 2023-12-24 RX ADMIN — Medication 25 MILLIGRAM(S): at 05:08

## 2023-12-24 RX ADMIN — OXYCODONE HYDROCHLORIDE 10 MILLIGRAM(S): 5 TABLET ORAL at 07:20

## 2023-12-24 RX ADMIN — Medication 10 MILLIGRAM(S): at 18:46

## 2023-12-24 RX ADMIN — Medication 50 MILLIGRAM(S): at 14:31

## 2023-12-24 RX ADMIN — OXYCODONE HYDROCHLORIDE 10 MILLIGRAM(S): 5 TABLET ORAL at 05:23

## 2023-12-24 RX ADMIN — Medication 250 MILLIGRAM(S): at 05:08

## 2023-12-24 RX ADMIN — Medication 60 MILLIGRAM(S): at 14:28

## 2023-12-24 RX ADMIN — ISOSORBIDE MONONITRATE 60 MILLIGRAM(S): 60 TABLET, EXTENDED RELEASE ORAL at 14:38

## 2023-12-24 RX ADMIN — Medication 4: at 12:40

## 2023-12-24 RX ADMIN — Medication 50 MILLIGRAM(S): at 23:02

## 2023-12-24 RX ADMIN — ATORVASTATIN CALCIUM 40 MILLIGRAM(S): 80 TABLET, FILM COATED ORAL at 23:01

## 2023-12-24 RX ADMIN — Medication 100 MILLIGRAM(S): at 23:02

## 2023-12-24 RX ADMIN — Medication 50 MILLIGRAM(S): at 05:09

## 2023-12-24 RX ADMIN — Medication 1 GRAM(S): at 14:30

## 2023-12-24 RX ADMIN — Medication 1 GRAM(S): at 18:46

## 2023-12-24 NOTE — PROGRESS NOTE ADULT - SUBJECTIVE AND OBJECTIVE BOX
CAPILLARY BLOOD GLUCOSE      POCT Blood Glucose.: 205 mg/dL (24 Dec 2023 08:05)  POCT Blood Glucose.: 193 mg/dL (23 Dec 2023 21:59)  POCT Blood Glucose.: 288 mg/dL (23 Dec 2023 17:27)  POCT Blood Glucose.: 293 mg/dL (23 Dec 2023 11:59)      Vital Signs Last 24 Hrs  T(C): 36.7 (24 Dec 2023 11:19), Max: 36.7 (24 Dec 2023 11:19)  T(F): 98 (24 Dec 2023 11:19), Max: 98 (24 Dec 2023 11:19)  HR: 83 (24 Dec 2023 11:19) (73 - 83)  BP: 114/71 (24 Dec 2023 11:19) (88/58 - 118/75)  BP(mean): --  RR: 18 (24 Dec 2023 11:19) (16 - 18)  SpO2: 91% (24 Dec 2023 11:19) (91% - 95%)    Parameters below as of 24 Dec 2023 11:19  Patient On (Oxygen Delivery Method): nasal cannula  O2 Flow (L/min): 2      General: WN/WD NAD  Respiratory: CTA B/L  CV: RRR, S1S2, no murmurs, rubs or gallops  Abdominal: Soft, NT, ND +BS, Last BM  Extremities: No edema, + peripheral pulses             atorvastatin 40 milliGRAM(s) Oral at bedtime  dextrose 50% Injectable 25 Gram(s) IV Push once  dextrose 50% Injectable 25 Gram(s) IV Push once  dextrose 50% Injectable 12.5 Gram(s) IV Push once  dextrose Oral Gel 15 Gram(s) Oral once  glucagon  Injectable 1 milliGRAM(s) IntraMuscular once  insulin glargine Injectable (LANTUS) 10 Unit(s) SubCutaneous at bedtime  insulin lispro (ADMELOG) corrective regimen sliding scale   SubCutaneous at bedtime  insulin lispro (ADMELOG) corrective regimen sliding scale   SubCutaneous three times a day before meals  methimazole 10 milliGRAM(s) Oral daily

## 2023-12-24 NOTE — PROGRESS NOTE ADULT - NS ATTEND AMEND GEN_ALL_CORE FT
Patient is a 54yo M with a PMH of CVA, chronic PE (on Eliquis), atrial fibrillation, hypertension, BPH, type 2 diabetes, chronic Aguilera, hyperlipidemia, GERD, CAD (s/p stent to RCA), peripheral vascular disease, hypothyroid who presents to the ED for SOB, chills and lethargy, found to be in rapid Afib w/ RVR.     [facility] resident  hx of paf, but does not have definite sxs, and unclear burden of af  previously on amio, stopped for thyroid issues  now with rapid af in the setting of apparent recurrent urinary infection   s/p diltiazem gtt, now po  cont bb  ac  mild elev trop  mild vol ol, cont po lasix and can increase if needed  repeat echo with normal LV function, mod mr  titrate off 02

## 2023-12-24 NOTE — PROGRESS NOTE ADULT - SUBJECTIVE AND OBJECTIVE BOX
Date of Service: 12-24-23 @ 12:06    Patient is a 53y old  Male who presents with a chief complaint of UTI (23 Dec 2023 13:19)      INTERVAL HPI/OVERNIGHT EVENTS: Patient seen and examined. NAD. No complaints.    Vital Signs Last 24 Hrs  T(C): 36.7 (24 Dec 2023 11:19), Max: 36.7 (24 Dec 2023 11:19)  T(F): 98 (24 Dec 2023 11:19), Max: 98 (24 Dec 2023 11:19)  HR: 83 (24 Dec 2023 11:19) (73 - 83)  BP: 114/71 (24 Dec 2023 11:19) (88/58 - 118/75)  BP(mean): --  RR: 18 (24 Dec 2023 11:19) (16 - 18)  SpO2: 91% (24 Dec 2023 11:19) (91% - 95%)    Parameters below as of 24 Dec 2023 11:19  Patient On (Oxygen Delivery Method): nasal cannula  O2 Flow (L/min): 2                CAPILLARY BLOOD GLUCOSE      POCT Blood Glucose.: 205 mg/dL (24 Dec 2023 08:05)  POCT Blood Glucose.: 193 mg/dL (23 Dec 2023 21:59)  POCT Blood Glucose.: 288 mg/dL (23 Dec 2023 17:27)              acetaminophen     Tablet .. 650 milliGRAM(s) Oral every 6 hours PRN  apixaban 5 milliGRAM(s) Oral every 12 hours  aspirin  chewable 81 milliGRAM(s) Oral daily  atorvastatin 40 milliGRAM(s) Oral at bedtime  bethanechol 25 milliGRAM(s) Oral three times a day  bisacodyl 5 milliGRAM(s) Oral every 12 hours PRN  cephalexin 500 milliGRAM(s) Oral four times a day  cholecalciferol 1000 Unit(s) Oral daily  dextrose 50% Injectable 25 Gram(s) IV Push once  dextrose 50% Injectable 25 Gram(s) IV Push once  dextrose 50% Injectable 12.5 Gram(s) IV Push once  dextrose Oral Gel 15 Gram(s) Oral once  diltiazem    Tablet 60 milliGRAM(s) Oral every 6 hours  famotidine    Tablet 20 milliGRAM(s) Oral daily  furosemide    Tablet 40 milliGRAM(s) Oral two times a day  gabapentin 300 milliGRAM(s) Oral two times a day  glucagon  Injectable 1 milliGRAM(s) IntraMuscular once  insulin glargine Injectable (LANTUS) 15 Unit(s) SubCutaneous at bedtime  insulin lispro (ADMELOG) corrective regimen sliding scale   SubCutaneous at bedtime  insulin lispro (ADMELOG) corrective regimen sliding scale   SubCutaneous three times a day before meals  insulin lispro Injectable (ADMELOG) 5 Unit(s) SubCutaneous three times a day before meals  isosorbide   mononitrate ER Tablet (IMDUR) 60 milliGRAM(s) Oral daily  losartan 100 milliGRAM(s) Oral daily  magnesium hydroxide Suspension 30 milliLiter(s) Oral at bedtime PRN  melatonin 5 milliGRAM(s) Oral at bedtime  methimazole 10 milliGRAM(s) Oral daily  metoclopramide 5 milliGRAM(s) Oral three times a day  metoprolol tartrate 50 milliGRAM(s) Oral every 8 hours  multivitamin/minerals 1 Tablet(s) Oral daily  oxybutynin 10 milliGRAM(s) Oral two times a day  oxyCODONE  ER Tablet 10 milliGRAM(s) Oral every 12 hours  pantoprazole    Tablet 40 milliGRAM(s) Oral before breakfast  phenazopyridine 100 milliGRAM(s) Oral every 8 hours  polyethylene glycol 3350 17 Gram(s) Oral two times a day  potassium chloride    Tablet ER 10 milliEquivalent(s) Oral daily  saccharomyces boulardii 250 milliGRAM(s) Oral two times a day  senna 2 Tablet(s) Oral at bedtime  sodium chloride 0.65% Nasal 1 Spray(s) Both Nostrils three times a day PRN  sucralfate 1 Gram(s) Oral four times a day  tamsulosin 0.8 milliGRAM(s) Oral at bedtime              REVIEW OF SYSTEMS:  CONSTITUTIONAL: No fever, no weight loss, or no fatigue  NECK: No pain, no stiffness  RESPIRATORY: No cough, no wheezing, no chills, no hemoptysis, No shortness of breath  CARDIOVASCULAR: No chest pain, no palpitations, no dizziness, no leg swelling  GASTROINTESTINAL: No abdominal pain. No nausea, no vomiting, no hematemesis; No diarrhea, no constipation. No melena, no hematochezia.  GENITOURINARY: No dysuria, no frequency, no hematuria, no incontinence  NEUROLOGICAL: No headaches, no loss of strength, no numbness, no tremors  SKIN: No itching, no burning  MUSCULOSKELETAL: No joint pain, no swelling; No muscle, no back, no extremity pain  PSYCHIATRIC: No depression, no mood swings,   HEME/LYMPH: No easy bruising, no bleeding gums  ALLERY AND IMMUNOLOGIC: No hives       Consultant(s) Notes Reviewed:  [X] YES  [ ] NO    PHYSICAL EXAM:  GENERAL: NAD  HEAD:  Atraumatic, Normocephalic  EYES: EOMI, PERRLA, conjunctiva and sclera clear  ENMT: No tonsillar erythema, exudates, or enlargement; Moist mucous membranes  NECK: Supple, No JVD  NERVOUS SYSTEM:  Awake & alert  CHEST/LUNG: Clear to auscultation bilaterally; No rales, rhonchi, wheezing,  HEART: Regular rate and rhythm  ABDOMEN: Soft, Nontender, Nondistended; Bowel sounds present  EXTREMITIES:  No clubbing, cyanosis, or edema  LYMPH: No lymphadenopathy noted  SKIN: No rashes      Advanced care planning discussed with patient/family [X] YES   [ ] NO    Advanced care planning discussed with patient/family. Patient's health status was discussed. All appropriate changes have been made regarding patient's end-of-life care. Advanced care planning forms reviewed/discussed/completed.  20 minutes spent.    Date of Service: 12-24-23 @ 12:06    Patient is a 53y old  Male who presents with a chief complaint of UTI (23 Dec 2023 13:19)      INTERVAL HPI/OVERNIGHT EVENTS: Patient seen and examined. NAD. No complaints.    Vital Signs Last 24 Hrs  T(C): 36.7 (24 Dec 2023 11:19), Max: 36.7 (24 Dec 2023 11:19)  T(F): 98 (24 Dec 2023 11:19), Max: 98 (24 Dec 2023 11:19)  HR: 83 (24 Dec 2023 11:19) (73 - 83)  BP: 114/71 (24 Dec 2023 11:19) (88/58 - 118/75)  BP(mean): --  RR: 18 (24 Dec 2023 11:19) (16 - 18)  SpO2: 91% (24 Dec 2023 11:19) (91% - 95%)    Parameters below as of 24 Dec 2023 11:19  Patient On (Oxygen Delivery Method): nasal cannula  O2 Flow (L/min): 2        Culture - Blood (12.21.23 @ 15:55)   Specimen Source: .Blood Blood-Venous  Culture Results:   No growth at 48 Hours      Historical Values  Culture - Blood (12.21.23 @ 15:55)   Specimen Source: .Blood Blood-Venous  Culture Results:   No growth at 48 Hours        CAPILLARY BLOOD GLUCOSE      POCT Blood Glucose.: 205 mg/dL (24 Dec 2023 08:05)  POCT Blood Glucose.: 193 mg/dL (23 Dec 2023 21:59)  POCT Blood Glucose.: 288 mg/dL (23 Dec 2023 17:27)              acetaminophen     Tablet .. 650 milliGRAM(s) Oral every 6 hours PRN  apixaban 5 milliGRAM(s) Oral every 12 hours  aspirin  chewable 81 milliGRAM(s) Oral daily  atorvastatin 40 milliGRAM(s) Oral at bedtime  bethanechol 25 milliGRAM(s) Oral three times a day  bisacodyl 5 milliGRAM(s) Oral every 12 hours PRN  cephalexin 500 milliGRAM(s) Oral four times a day  cholecalciferol 1000 Unit(s) Oral daily  dextrose 50% Injectable 25 Gram(s) IV Push once  dextrose 50% Injectable 25 Gram(s) IV Push once  dextrose 50% Injectable 12.5 Gram(s) IV Push once  dextrose Oral Gel 15 Gram(s) Oral once  diltiazem    Tablet 60 milliGRAM(s) Oral every 6 hours  famotidine    Tablet 20 milliGRAM(s) Oral daily  furosemide    Tablet 40 milliGRAM(s) Oral two times a day  gabapentin 300 milliGRAM(s) Oral two times a day  glucagon  Injectable 1 milliGRAM(s) IntraMuscular once  insulin glargine Injectable (LANTUS) 15 Unit(s) SubCutaneous at bedtime  insulin lispro (ADMELOG) corrective regimen sliding scale   SubCutaneous at bedtime  insulin lispro (ADMELOG) corrective regimen sliding scale   SubCutaneous three times a day before meals  insulin lispro Injectable (ADMELOG) 5 Unit(s) SubCutaneous three times a day before meals  isosorbide   mononitrate ER Tablet (IMDUR) 60 milliGRAM(s) Oral daily  losartan 100 milliGRAM(s) Oral daily  magnesium hydroxide Suspension 30 milliLiter(s) Oral at bedtime PRN  melatonin 5 milliGRAM(s) Oral at bedtime  methimazole 10 milliGRAM(s) Oral daily  metoclopramide 5 milliGRAM(s) Oral three times a day  metoprolol tartrate 50 milliGRAM(s) Oral every 8 hours  multivitamin/minerals 1 Tablet(s) Oral daily  oxybutynin 10 milliGRAM(s) Oral two times a day  oxyCODONE  ER Tablet 10 milliGRAM(s) Oral every 12 hours  pantoprazole    Tablet 40 milliGRAM(s) Oral before breakfast  phenazopyridine 100 milliGRAM(s) Oral every 8 hours  polyethylene glycol 3350 17 Gram(s) Oral two times a day  potassium chloride    Tablet ER 10 milliEquivalent(s) Oral daily  saccharomyces boulardii 250 milliGRAM(s) Oral two times a day  senna 2 Tablet(s) Oral at bedtime  sodium chloride 0.65% Nasal 1 Spray(s) Both Nostrils three times a day PRN  sucralfate 1 Gram(s) Oral four times a day  tamsulosin 0.8 milliGRAM(s) Oral at bedtime              REVIEW OF SYSTEMS:  CONSTITUTIONAL: No fever, no weight loss, or no fatigue  NECK: No pain, no stiffness  RESPIRATORY: No cough, no wheezing, no chills, no hemoptysis, No shortness of breath  CARDIOVASCULAR: No chest pain, no palpitations, no dizziness, no leg swelling  GASTROINTESTINAL: No abdominal pain. No nausea, no vomiting, no hematemesis; No diarrhea, no constipation. No melena, no hematochezia.  GENITOURINARY: No dysuria, no frequency, no hematuria, no incontinence  NEUROLOGICAL: No headaches, no loss of strength, no numbness, no tremors  SKIN: No itching, no burning  MUSCULOSKELETAL: No joint pain, no swelling; No muscle, no back, no extremity pain  PSYCHIATRIC: No depression, no mood swings,   HEME/LYMPH: No easy bruising, no bleeding gums  ALLERY AND IMMUNOLOGIC: No hives       Consultant(s) Notes Reviewed:  [X] YES  [ ] NO    PHYSICAL EXAM:  GENERAL: NAD  HEAD:  Atraumatic, Normocephalic  EYES: EOMI, PERRLA, conjunctiva and sclera clear  ENMT: No tonsillar erythema, exudates, or enlargement; Moist mucous membranes  NECK: Supple, No JVD  NERVOUS SYSTEM:  Awake & alert  CHEST/LUNG: Clear to auscultation bilaterally; No rales, rhonchi, wheezing,  HEART: Regular rate and rhythm  ABDOMEN: Soft, Nontender, Nondistended; Bowel sounds present  EXTREMITIES:  No clubbing, cyanosis, or edema  LYMPH: No lymphadenopathy noted  SKIN: No rashes      Advanced care planning discussed with patient/family [X] YES   [ ] NO    Advanced care planning discussed with patient/family. Patient's health status was discussed. All appropriate changes have been made regarding patient's end-of-life care. Advanced care planning forms reviewed/discussed/completed.  20 minutes spent.

## 2023-12-24 NOTE — PROGRESS NOTE ADULT - ASSESSMENT
53-year-old male with a history of a CVA, chronic PE, atrial fibrillation, hypertension, BPH, type 2 diabetes, chronic Aguilera, hyperlipidemia, GERD, CAD, peripheral vascular disease, hypothyroid presents with brought in by EMS from SNF for generalized weakness, urinary symptoms, feeling of obstruction, tachycardia, RT CVA tenderness, bandemia  CT-Bilateral perinephric fat stranding, greater on the left which is a   nonspecific finding, however may reflect bilateral ascending urinary   tract infections.    RECOMMENDATIONS  1-Pyelonephritis/Klebsiella Bacteremia compelling with consistent history, physical exam, imaging and lab  -changed to ceftriaxone (started 12/20)-continue for now,  Culture - Blood (12.20.23 @ 08:15)    -  K. pneumoniae group: Detec (K. pneumoniae, K. quasipneumoniae, K. variicola)  -  Ceftriaxone: S <=1  Urine culture >100,000 CFU/ml Gram Negative Rods  12/21-rpt blood cultures collected 12/21 15:50-NGTD  -blood cultures NGTD past 48 hours will change to  Keflex 500mg PO QID with last day 12/29    From an ID standpoint no further requirement for inpatient status for the management of ID issues. Fine with discharge from ID standpoint when other medical issues no longer require inpatient care and social issues allow for a safe discharge plan. To schedule an outpatient ID follow up appointment please call our office at [phone]      Thank you for consulting us and involving us in the management of this most interesting and challenging case.  We will follow along in the care of this patient. Please call us at [phone] or text me directly on my cell# at [phone] with any concerns.   53-year-old male with a history of a CVA, chronic PE, atrial fibrillation, hypertension, BPH, type 2 diabetes, chronic Aguilera, hyperlipidemia, GERD, CAD, peripheral vascular disease, hypothyroid presents with brought in by EMS from SNF for generalized weakness, urinary symptoms, feeling of obstruction, tachycardia, RT CVA tenderness, bandemia  CT-Bilateral perinephric fat stranding, greater on the left which is a   nonspecific finding, however may reflect bilateral ascending urinary   tract infections.    RECOMMENDATIONS  1-Pyelonephritis/Klebsiella Bacteremia compelling with consistent history, physical exam, imaging and lab  -changed to ceftriaxone (started 12/20)-continue for now,  Culture - Blood (12.20.23 @ 08:15)    -  K. pneumoniae group: Detec (K. pneumoniae, K. quasipneumoniae, K. variicola)  -  Ceftriaxone: S <=1  Urine culture >100,000 CFU/ml Gram Negative Rods  12/21-rpt blood cultures collected 12/21 15:50-NGTD  -blood cultures NGTD past 48 hours will change to  Keflex 500mg PO QID with last day 12/29    From an ID standpoint no further requirement for inpatient status for the management of ID issues. Fine with discharge from ID standpoint when other medical issues no longer require inpatient care and social issues allow for a safe discharge plan. To schedule an outpatient ID follow up appointment please call our office at [phone]      Thank you for consulting us and involving us in the management of this most interesting and challenging case.  We will follow along in the care of this patient. Please call us at [phone] or text me directly on my cell# at [phone] with any concerns.

## 2023-12-24 NOTE — PROGRESS NOTE ADULT - SUBJECTIVE AND OBJECTIVE BOX
[facility]  Juventino Whitten MD PhD, Symone Hickey MD, Anne-Marie Li MD, Jeffery Jacobs MD, Ryan Romeo MD  and providing coverage with Melody Armstrong MD  Providing Infectious Disease Consultations at [facility], [facility], [facility]'s    Office# [phone] to schedule follow up appointments  Answering Service for urgent calls or New Consults [phone]  Cell# to text for urgent issues Juventino Whitten [phone]     infectious diseases progress note:    SARAH MORRISON is a 53y y. o. Male patient    Overnight and events of the last 24hrs reviewed    Allergies    No Known Drug Allergies  fish (Hives)    Intolerances        ANTIBIOTICS/RELEVANT:  antimicrobials  cefTRIAXone   IVPB 2000 milliGRAM(s) IV Intermittent every 24 hours    immunologic:    OTHER:  acetaminophen     Tablet .. 650 milliGRAM(s) Oral every 6 hours PRN  apixaban 5 milliGRAM(s) Oral every 12 hours  aspirin  chewable 81 milliGRAM(s) Oral daily  atorvastatin 40 milliGRAM(s) Oral at bedtime  bethanechol 25 milliGRAM(s) Oral three times a day  bisacodyl 5 milliGRAM(s) Oral every 12 hours PRN  cholecalciferol 1000 Unit(s) Oral daily  dextrose 50% Injectable 12.5 Gram(s) IV Push once  dextrose 50% Injectable 25 Gram(s) IV Push once  dextrose 50% Injectable 25 Gram(s) IV Push once  dextrose Oral Gel 15 Gram(s) Oral once  diltiazem    Tablet 60 milliGRAM(s) Oral every 6 hours  famotidine    Tablet 20 milliGRAM(s) Oral daily  furosemide    Tablet 40 milliGRAM(s) Oral two times a day  gabapentin 300 milliGRAM(s) Oral two times a day  glucagon  Injectable 1 milliGRAM(s) IntraMuscular once  insulin glargine Injectable (LANTUS) 10 Unit(s) SubCutaneous at bedtime  insulin lispro (ADMELOG) corrective regimen sliding scale   SubCutaneous three times a day before meals  insulin lispro (ADMELOG) corrective regimen sliding scale   SubCutaneous at bedtime  isosorbide   mononitrate ER Tablet (IMDUR) 60 milliGRAM(s) Oral daily  losartan 100 milliGRAM(s) Oral daily  magnesium hydroxide Suspension 30 milliLiter(s) Oral at bedtime PRN  melatonin 5 milliGRAM(s) Oral at bedtime  methimazole 10 milliGRAM(s) Oral daily  metoclopramide 5 milliGRAM(s) Oral three times a day  metoprolol tartrate 50 milliGRAM(s) Oral every 8 hours  multivitamin/minerals 1 Tablet(s) Oral daily  oxybutynin 10 milliGRAM(s) Oral two times a day  oxyCODONE  ER Tablet 10 milliGRAM(s) Oral every 12 hours  pantoprazole    Tablet 40 milliGRAM(s) Oral before breakfast  phenazopyridine 100 milliGRAM(s) Oral every 8 hours  polyethylene glycol 3350 17 Gram(s) Oral two times a day  potassium chloride    Tablet ER 10 milliEquivalent(s) Oral daily  saccharomyces boulardii 250 milliGRAM(s) Oral two times a day  senna 2 Tablet(s) Oral at bedtime  sodium chloride 0.65% Nasal 1 Spray(s) Both Nostrils three times a day PRN  sucralfate 1 Gram(s) Oral four times a day  tamsulosin 0.8 milliGRAM(s) Oral at bedtime      Objective:  Vital Signs Last 24 Hrs  T(C): 36.7 (24 Dec 2023 11:19), Max: 36.9 (23 Dec 2023 11:36)  T(F): 98 (24 Dec 2023 11:19), Max: 98.4 (23 Dec 2023 11:36)  HR: 83 (24 Dec 2023 11:19) (73 - 95)  BP: 114/71 (24 Dec 2023 11:19) (88/58 - 124/78)  BP(mean): --  RR: 18 (24 Dec 2023 11:19) (16 - 18)  SpO2: 91% (24 Dec 2023 11:19) (91% - 96%)    Parameters below as of 24 Dec 2023 11:19  Patient On (Oxygen Delivery Method): nasal cannula  O2 Flow (L/min): 2      T(C): 36.7 (12-24-23 @ 11:19), Max: 36.9 (12-23-23 @ 11:36)  T(C): 36.7 (12-24-23 @ 11:19), Max: 37.4 (12-21-23 @ 20:29)  T(C): 36.7 (12-24-23 @ 11:19), Max: 38.7 (12-21-23 @ 03:25)    PHYSICAL EXAM:  HEENT: NC atraumatic  Neck: supple  Respiratory: no accessory muscle use, breathing comfortably  Cardiovascular: distant  Gastrointestinal: normal appearing, nondistended  Extremities: no clubbing, no cyanosis,        LABS:        WBC  8.66 12-22 @ 07:08  12.40 12-21 @ 04:45  6.32 12-20 @ 08:20              Creatinine: 0.99 mg/dL (12-22-23 @ 07:08)  Creatinine: 1.40 mg/dL (12-21-23 @ 04:45)  Creatinine: 1.10 mg/dL (12-20-23 @ 08:20)                INFLAMMATORY MARKERS      MICROBIOLOGY:    Culture - Blood (12.21.23 @ 15:55)    Specimen Source: .Blood Blood-Venous   Culture Results:   No growth at 48 Hours        RADIOLOGY & ADDITIONAL STUDIES:   [facility]  Juventino Whitten MD PhD, Symone Hickey MD, Anne-Marie Li MD, Jeffery Jacobs MD, Ryan Romeo MD  and providing coverage with Melody Armstrong MD  Providing Infectious Disease Consultations at [facility], [facility], [facility]'s    Office# [phone] to schedule follow up appointments  Answering Service for urgent calls or New Consults [phone]  Cell# to text for urgent issues Juventino Whitten [phone]     infectious diseases progress note:    SARAH MORRISON is a 53y y. o. Male patient    Overnight and events of the last 24hrs reviewed    Allergies    No Known Drug Allergies  fish (Hives)    Intolerances        ANTIBIOTICS/RELEVANT:  antimicrobials  cefTRIAXone   IVPB 2000 milliGRAM(s) IV Intermittent every 24 hours    immunologic:    OTHER:  acetaminophen     Tablet .. 650 milliGRAM(s) Oral every 6 hours PRN  apixaban 5 milliGRAM(s) Oral every 12 hours  aspirin  chewable 81 milliGRAM(s) Oral daily  atorvastatin 40 milliGRAM(s) Oral at bedtime  bethanechol 25 milliGRAM(s) Oral three times a day  bisacodyl 5 milliGRAM(s) Oral every 12 hours PRN  cholecalciferol 1000 Unit(s) Oral daily  dextrose 50% Injectable 12.5 Gram(s) IV Push once  dextrose 50% Injectable 25 Gram(s) IV Push once  dextrose 50% Injectable 25 Gram(s) IV Push once  dextrose Oral Gel 15 Gram(s) Oral once  diltiazem    Tablet 60 milliGRAM(s) Oral every 6 hours  famotidine    Tablet 20 milliGRAM(s) Oral daily  furosemide    Tablet 40 milliGRAM(s) Oral two times a day  gabapentin 300 milliGRAM(s) Oral two times a day  glucagon  Injectable 1 milliGRAM(s) IntraMuscular once  insulin glargine Injectable (LANTUS) 10 Unit(s) SubCutaneous at bedtime  insulin lispro (ADMELOG) corrective regimen sliding scale   SubCutaneous three times a day before meals  insulin lispro (ADMELOG) corrective regimen sliding scale   SubCutaneous at bedtime  isosorbide   mononitrate ER Tablet (IMDUR) 60 milliGRAM(s) Oral daily  losartan 100 milliGRAM(s) Oral daily  magnesium hydroxide Suspension 30 milliLiter(s) Oral at bedtime PRN  melatonin 5 milliGRAM(s) Oral at bedtime  methimazole 10 milliGRAM(s) Oral daily  metoclopramide 5 milliGRAM(s) Oral three times a day  metoprolol tartrate 50 milliGRAM(s) Oral every 8 hours  multivitamin/minerals 1 Tablet(s) Oral daily  oxybutynin 10 milliGRAM(s) Oral two times a day  oxyCODONE  ER Tablet 10 milliGRAM(s) Oral every 12 hours  pantoprazole    Tablet 40 milliGRAM(s) Oral before breakfast  phenazopyridine 100 milliGRAM(s) Oral every 8 hours  polyethylene glycol 3350 17 Gram(s) Oral two times a day  potassium chloride    Tablet ER 10 milliEquivalent(s) Oral daily  saccharomyces boulardii 250 milliGRAM(s) Oral two times a day  senna 2 Tablet(s) Oral at bedtime  sodium chloride 0.65% Nasal 1 Spray(s) Both Nostrils three times a day PRN  sucralfate 1 Gram(s) Oral four times a day  tamsulosin 0.8 milliGRAM(s) Oral at bedtime      Objective:  Vital Signs Last 24 Hrs  T(C): 36.7 (24 Dec 2023 11:19), Max: 36.9 (23 Dec 2023 11:36)  T(F): 98 (24 Dec 2023 11:19), Max: 98.4 (23 Dec 2023 11:36)  HR: 83 (24 Dec 2023 11:19) (73 - 95)  BP: 114/71 (24 Dec 2023 11:19) (88/58 - 124/78)  BP(mean): --  RR: 18 (24 Dec 2023 11:19) (16 - 18)  SpO2: 91% (24 Dec 2023 11:19) (91% - 96%)    Parameters below as of 24 Dec 2023 11:19  Patient On (Oxygen Delivery Method): nasal cannula  O2 Flow (L/min): 2      T(C): 36.7 (12-24-23 @ 11:19), Max: 36.9 (12-23-23 @ 11:36)  T(C): 36.7 (12-24-23 @ 11:19), Max: 37.4 (12-21-23 @ 20:29)  T(C): 36.7 (12-24-23 @ 11:19), Max: 38.7 (12-21-23 @ 03:25)    PHYSICAL EXAM:  HEENT: NC atraumatic  Neck: supple  Respiratory: no accessory muscle use, breathing comfortably  Cardiovascular: distant  Gastrointestinal: normal appearing, nondistended  Extremities: no clubbing, no cyanosis,        LABS:        WBC  8.66 12-22 @ 07:08  12.40 12-21 @ 04:45  6.32 12-20 @ 08:20              Creatinine: 0.99 mg/dL (12-22-23 @ 07:08)  Creatinine: 1.40 mg/dL (12-21-23 @ 04:45)  Creatinine: 1.10 mg/dL (12-20-23 @ 08:20)                INFLAMMATORY MARKERS      MICROBIOLOGY:    Culture - Blood (12.21.23 @ 15:55)    Specimen Source: .Blood Blood-Venous   Culture Results:   No growth at 48 Hours        RADIOLOGY & ADDITIONAL STUDIES:

## 2023-12-24 NOTE — PROGRESS NOTE ADULT - SUBJECTIVE AND OBJECTIVE BOX
Date/Time Patient Seen:  		  Referring MD:   Data Reviewed	       Patient is a 53y old  Male who presents with a chief complaint of UTI (23 Dec 2023 13:19)      Subjective/HPI     PAST MEDICAL & SURGICAL HISTORY:  No pertinent past medical history    Diabetes    No pertinent past medical history    Diabetes mellitus with no complication    Afib    Hypertension    BPH (benign prostatic hyperplasia)    Perforated gastric ulcer  s/p emergent ex-lap omentopexy and plication 6/2019    Pulmonary embolism    History of non-ST elevation myocardial infarction (NSTEMI)    Osteomyelitis  s/p debridement    CAD S/P percutaneous coronary angioplasty    Cerebrovascular accident    No significant past surgical history    No significant past surgical history    H/O abdominal surgery    Perforated gastric ulcer    Traumatic amputation of left foot, initial encounter          Medication list         MEDICATIONS  (STANDING):  apixaban 5 milliGRAM(s) Oral every 12 hours  aspirin  chewable 81 milliGRAM(s) Oral daily  atorvastatin 40 milliGRAM(s) Oral at bedtime  bethanechol 25 milliGRAM(s) Oral three times a day  cefTRIAXone   IVPB 2000 milliGRAM(s) IV Intermittent every 24 hours  cholecalciferol 1000 Unit(s) Oral daily  dextrose 50% Injectable 25 Gram(s) IV Push once  dextrose 50% Injectable 25 Gram(s) IV Push once  dextrose 50% Injectable 12.5 Gram(s) IV Push once  dextrose Oral Gel 15 Gram(s) Oral once  diltiazem    Tablet 60 milliGRAM(s) Oral every 6 hours  famotidine    Tablet 20 milliGRAM(s) Oral daily  furosemide    Tablet 40 milliGRAM(s) Oral two times a day  gabapentin 300 milliGRAM(s) Oral two times a day  glucagon  Injectable 1 milliGRAM(s) IntraMuscular once  insulin glargine Injectable (LANTUS) 10 Unit(s) SubCutaneous at bedtime  insulin lispro (ADMELOG) corrective regimen sliding scale   SubCutaneous at bedtime  insulin lispro (ADMELOG) corrective regimen sliding scale   SubCutaneous three times a day before meals  isosorbide   mononitrate ER Tablet (IMDUR) 60 milliGRAM(s) Oral daily  losartan 100 milliGRAM(s) Oral daily  melatonin 5 milliGRAM(s) Oral at bedtime  methimazole 10 milliGRAM(s) Oral daily  metoclopramide 5 milliGRAM(s) Oral three times a day  metoprolol tartrate 50 milliGRAM(s) Oral every 8 hours  multivitamin/minerals 1 Tablet(s) Oral daily  oxybutynin 10 milliGRAM(s) Oral two times a day  oxyCODONE  ER Tablet 10 milliGRAM(s) Oral every 12 hours  pantoprazole    Tablet 40 milliGRAM(s) Oral before breakfast  phenazopyridine 100 milliGRAM(s) Oral every 8 hours  polyethylene glycol 3350 17 Gram(s) Oral two times a day  potassium chloride    Tablet ER 10 milliEquivalent(s) Oral daily  saccharomyces boulardii 250 milliGRAM(s) Oral two times a day  senna 2 Tablet(s) Oral at bedtime  sucralfate 1 Gram(s) Oral four times a day  tamsulosin 0.8 milliGRAM(s) Oral at bedtime    MEDICATIONS  (PRN):  acetaminophen     Tablet .. 650 milliGRAM(s) Oral every 6 hours PRN Temp greater or equal to 38C (100.4F), Mild Pain (1 - 3)  bisacodyl 5 milliGRAM(s) Oral every 12 hours PRN Constipation  magnesium hydroxide Suspension 30 milliLiter(s) Oral at bedtime PRN Constipation  sodium chloride 0.65% Nasal 1 Spray(s) Both Nostrils three times a day PRN Nasal Congestion         Vitals log        ICU Vital Signs Last 24 Hrs  T(C): 36.5 (24 Dec 2023 05:10), Max: 36.9 (23 Dec 2023 11:36)  T(F): 97.7 (24 Dec 2023 05:10), Max: 98.4 (23 Dec 2023 11:36)  HR: 75 (24 Dec 2023 05:10) (73 - 95)  BP: 118/75 (24 Dec 2023 05:10) (88/58 - 124/78)  BP(mean): --  ABP: --  ABP(mean): --  RR: 16 (24 Dec 2023 05:10) (16 - 18)  SpO2: 94% (24 Dec 2023 05:10) (94% - 96%)    O2 Parameters below as of 24 Dec 2023 05:10  Patient On (Oxygen Delivery Method): nasal cannula  O2 Flow (L/min): 2               Input and Output:  I&O's Detail    22 Dec 2023 07:01  -  23 Dec 2023 07:00  --------------------------------------------------------  IN:    sodium chloride 0.9%: 75 mL  Total IN: 75 mL    OUT:    Indwelling Catheter - Urethral (mL): 2400 mL  Total OUT: 2400 mL    Total NET: -2325 mL      23 Dec 2023 07:01  -  24 Dec 2023 06:40  --------------------------------------------------------  IN:    sodium chloride 0.9%: 150 mL  Total IN: 150 mL    OUT:    Indwelling Catheter - Urethral (mL): 2850 mL  Total OUT: 2850 mL    Total NET: -2700 mL          Lab Data                        10.7   8.66  )-----------( 119      ( 22 Dec 2023 07:08 )             33.0     12-22    140  |  110<H>  |  20  ----------------------------<  189<H>  3.8   |  24  |  0.99    Ca    7.8<L>      22 Dec 2023 07:08    TPro  6.3  /  Alb  2.4<L>  /  TBili  0.4  /  DBili  x   /  AST  38<H>  /  ALT  24  /  AlkPhos  92  12-22            Review of Systems	      Objective     Physical Examination    heart s1s2  lung dc BS  head nc      Pertinent Lab findings & Imaging      Ale:  NO   Adequate UO     I&O's Detail    22 Dec 2023 07:01  -  23 Dec 2023 07:00  --------------------------------------------------------  IN:    sodium chloride 0.9%: 75 mL  Total IN: 75 mL    OUT:    Indwelling Catheter - Urethral (mL): 2400 mL  Total OUT: 2400 mL    Total NET: -2325 mL      23 Dec 2023 07:01  -  24 Dec 2023 06:40  --------------------------------------------------------  IN:    sodium chloride 0.9%: 150 mL  Total IN: 150 mL    OUT:    Indwelling Catheter - Urethral (mL): 2850 mL  Total OUT: 2850 mL    Total NET: -2700 mL               Discussed with:     Cultures:	        Radiology

## 2023-12-24 NOTE — PROGRESS NOTE ADULT - ASSESSMENT
53-year-old male with a history of a CVA, chronic PE, atrial fibrillation, hypertension, BPH, type 2 diabetes, chronic Aguilera, hyperlipidemia, GERD, CAD, peripheral vascular disease, hypothyroid presents with brought in by EMS from SNF for generalized weakness, urinary symptoms, feeling of obstruction, tachycardia, possible infection    sepsis  uti  hydro  AF  OP  OA  Atelectasis  hx of PE  CVA hx  HTN  DM  chr Aguilera Cath  HLD  GERD  CAD    fio2 wean  vs noted  cxr noted - infiltrate - atelectasis - eff  TTE - mitral valve disease  on LASIX    s/p OBI  on lasix   HF - eval and management  cardio f/u  I and O  serial renal indices  monitor VS and Sat  proBNP noted - c/w Vol Overload  on AC - Eliquis - hx of PE - AFIB  AF - rate and rhythm control  BP control  on EMP ABX - cx - biomarkers - CT imaging reviewed  ID input reviewed  long term resident of SNF  GOC documented  FULL CODE  fio2 titration  goal sat > 88 pct

## 2023-12-24 NOTE — PROGRESS NOTE ADULT - ASSESSMENT
52yo M with a PMH of CVA, chronic PE (on Eliquis), atrial fibrillation, hypertension, BPH, type 2 diabetes, chronic Aguilera, hyperlipidemia, GERD, CAD (s/p stent to RCA), peripheral vascular disease, hypothyroid who presents to the ED for SOB, chills and lethargy, found to be in rapid Afib w/ RVR. Patient states he used to follow with Cardio Dr. [place], but has not followed up with Cardio in many years.     A fib RVR, chr PE, HTN, HLD, CAD, stents, PVD  - Hx of PAF but no clear documentation on whether the patient has had recent episodes of Afib  - Previously on Amiodarone, but d/c due to patient having negative side effects affecting his thyroid   - EKbpm, Afib w/ RVR  - S/p IV Cardizem 10mg x1    - BP, HR stable and controlled per flow sheet   - continue Cardizem 60 mg PO Q6H  - continue Eliquis for chronic PE and now Afib    - Continue Metoprolol tartrate, increase to 50mg Q8H and titrate up if needed   - continue home Losartan and Imdur    - Hx of CAD s/p 1 stent to RCA   - EKbpm, Afib w/ RVR  - Troponin: <-98.2 likely elevated in the setting of demand ischemia  - continue ASA and statin    - no anginal complaints     - Pro bnp [id number]  - TTE (): EF 65%, mild MR, concentric LVH, grade II diastolic dysfunction    - TTE (2023) LVSF normal with EF 55 to 60 %, mod LVH, mod MR, Trace pericardial effusion.  - Continue home Lasix 40mg PO daily   - Continue Fluid restriction and Strict I/Os, daily weights  - Monitor and replete Lytes. Keep K > 4 and Mg > 2    - Will continue to follow.    HANNAH GrahamBESSIE  Nurse Practitioner - Cardiology   call TEAMS

## 2023-12-24 NOTE — PROGRESS NOTE ADULT - SUBJECTIVE AND OBJECTIVE BOX
[facility] -- Brittany Lutz Pannella, Patel, Savella, Goodger, Cohen  Office # [phone]    Follow Up:  Afib     Subjective/Observations: seen and examined, awake, alert, resting comfortably in bed, denies chest pain, dyspnea, palpitations or dizziness, orthopnea and PND. on O2 via NC     REVIEW OF SYSTEMS: All other review of systems is negative unless indicated above  PAST MEDICAL & SURGICAL HISTORY:  Diabetes      Diabetes mellitus with no complication      Afib      Hypertension      BPH (benign prostatic hyperplasia)      Perforated gastric ulcer  s/p emergent ex-lap omentopexy and plication 6/2019      Pulmonary embolism      History of non-ST elevation myocardial infarction (NSTEMI)      Osteomyelitis  s/p debridement      CAD S/P percutaneous coronary angioplasty      Cerebrovascular accident      H/O abdominal surgery      Perforated gastric ulcer      Traumatic amputation of left foot, initial encounter        MEDICATIONS  (STANDING):  apixaban 5 milliGRAM(s) Oral every 12 hours  aspirin  chewable 81 milliGRAM(s) Oral daily  atorvastatin 40 milliGRAM(s) Oral at bedtime  bethanechol 25 milliGRAM(s) Oral three times a day  cefTRIAXone   IVPB 2000 milliGRAM(s) IV Intermittent every 24 hours  cholecalciferol 1000 Unit(s) Oral daily  dextrose 50% Injectable 12.5 Gram(s) IV Push once  dextrose 50% Injectable 25 Gram(s) IV Push once  dextrose 50% Injectable 25 Gram(s) IV Push once  dextrose Oral Gel 15 Gram(s) Oral once  diltiazem    Tablet 60 milliGRAM(s) Oral every 6 hours  famotidine    Tablet 20 milliGRAM(s) Oral daily  furosemide    Tablet 40 milliGRAM(s) Oral two times a day  gabapentin 300 milliGRAM(s) Oral two times a day  glucagon  Injectable 1 milliGRAM(s) IntraMuscular once  insulin glargine Injectable (LANTUS) 10 Unit(s) SubCutaneous at bedtime  insulin lispro (ADMELOG) corrective regimen sliding scale   SubCutaneous three times a day before meals  insulin lispro (ADMELOG) corrective regimen sliding scale   SubCutaneous at bedtime  isosorbide   mononitrate ER Tablet (IMDUR) 60 milliGRAM(s) Oral daily  losartan 100 milliGRAM(s) Oral daily  melatonin 5 milliGRAM(s) Oral at bedtime  methimazole 10 milliGRAM(s) Oral daily  metoclopramide 5 milliGRAM(s) Oral three times a day  metoprolol tartrate 50 milliGRAM(s) Oral every 8 hours  multivitamin/minerals 1 Tablet(s) Oral daily  oxybutynin 10 milliGRAM(s) Oral two times a day  oxyCODONE  ER Tablet 10 milliGRAM(s) Oral every 12 hours  pantoprazole    Tablet 40 milliGRAM(s) Oral before breakfast  phenazopyridine 100 milliGRAM(s) Oral every 8 hours  polyethylene glycol 3350 17 Gram(s) Oral two times a day  potassium chloride    Tablet ER 10 milliEquivalent(s) Oral daily  saccharomyces boulardii 250 milliGRAM(s) Oral two times a day  senna 2 Tablet(s) Oral at bedtime  sucralfate 1 Gram(s) Oral four times a day  tamsulosin 0.8 milliGRAM(s) Oral at bedtime    MEDICATIONS  (PRN):  acetaminophen     Tablet .. 650 milliGRAM(s) Oral every 6 hours PRN Temp greater or equal to 38C (100.4F), Mild Pain (1 - 3)  bisacodyl 5 milliGRAM(s) Oral every 12 hours PRN Constipation  magnesium hydroxide Suspension 30 milliLiter(s) Oral at bedtime PRN Constipation  sodium chloride 0.65% Nasal 1 Spray(s) Both Nostrils three times a day PRN Nasal Congestion    Allergies    No Known Drug Allergies  fish (Hives)    Intolerances      Vital Signs Last 24 Hrs  T(C): 36.7 (24 Dec 2023 11:19), Max: 36.9 (23 Dec 2023 11:36)  T(F): 98 (24 Dec 2023 11:19), Max: 98.4 (23 Dec 2023 11:36)  HR: 83 (24 Dec 2023 11:19) (73 - 95)  BP: 114/71 (24 Dec 2023 11:19) (88/58 - 124/78)  BP(mean): --  RR: 18 (24 Dec 2023 11:19) (16 - 18)  SpO2: 91% (24 Dec 2023 11:19) (91% - 96%)    Parameters below as of 24 Dec 2023 11:19  Patient On (Oxygen Delivery Method): nasal cannula  O2 Flow (L/min): 2    I&O's Summary    23 Dec 2023 07:01  -  24 Dec 2023 07:00  --------------------------------------------------------  IN: 150 mL / OUT: 2850 mL / NET: -2700 mL          TELE: Not on telemetry   PHYSICAL EXAM:  Constitutional: NAD, awake and alert  HEENT: Moist Mucous Membranes, Anicteric  Pulmonary: Non-labored, breath sounds are clear bilaterally, No wheezing, rales or rhonchi  Cardiovascular:Regular, S1 and S2, No murmurs, rubs, gallops or clicks  Gastrointestinal: Bowel Sounds present, soft, nontender.   Lymph: + L edema (chronic) No lymphadenopathy.  Skin: No visible rashes or ulcers. left foot stump  Psych:  Mood & affect appropriate  LABS: All Labs Reviewed:                        10.7   8.66  )-----------( 119      ( 22 Dec 2023 07:08 )             33.0     22 Dec 2023 07:08    140    |  110    |  20     ----------------------------<  189    3.8     |  24     |  0.99     Ca    7.8        22 Dec 2023 07:08    TPro  6.3    /  Alb  2.4    /  TBili  0.4    /  DBili  x      /  AST  38     /  ALT  24     /  AlkPhos  92     22 Dec 2023 07:08          12 Lead ECG:   Ventricular Rate 164 BPM    QRS Duration 78 ms    Q-T Interval 232 ms    QTC Calculation(Bazett) 383 ms    R Axis 3 degrees    T Axis 60 degrees    Diagnosis Line *** Critical Test Result: High HR  Atrial fibrillation with rapid ventricular response  Low voltage QRS  Abnormal ECG  When compared with ECG of 16-JUN-2021 08:29,  Atrial fibrillation has replaced Sinus rhythm  Vent. rate has increased BY  97 BPM  Nonspecific T wave abnormality now evident in Lateral leads  Confirmed by Juventino Soto MD ([id number]) on 12/20/2023 2:27:15 PM (12-20-23 @ 08:11)      TRANSTHORACIC ECHOCARDIOGRAM REPORT  ________________________________________________________________________________                                      _______       Pt. Name:       SARAH MORRISON Study Date:    12/23/2023  MRN:            [id number]         YOB: 1970  Accession #:    [id number]        Age:           53 years  Account#:       [id number]       Gender:        M  Heart Rate:                      Height:        74.02 in (188.00 cm)  Rhythm:Weight:        255.73 lb (116.00 kg)  Blood Pressure: 102/58 mmHg      BSA/BMI:       2.41 m² / 32.82 kg/m²  ________________________________________________________________________________________  Referring Physician:    [id number] Hill Brizuela  Interpreting Physician: Jeovany France  Primary Sonographer:    Heidi Redd [facility] [[email or web address]]    CPT:               ECHO TTE WO CON COMP W DOPP - 39948.m  Indication(s):     Abnormal electrocardiogram ECG/EKG - R94.31  Procedure:         Transthoracic echocardiogram with 2-D, M-mode and complete                     spectral and color flow Doppler.  Ordering Location: [facility]    _______________________________________________________________________________________     CONCLUSIONS:      1. Left ventricular systolic function is normal with an ejection fraction visually estimated at 55 to 60 %.   2. Moderate left ventricular hypertrophy.   3. The left ventricular diastolic function is indeterminate.   4. Normal right ventricular cavity size, wall thickness, and systolic function.   5. The left atrium is moderately dilated.   6. The right atrium is moderately dilated in size.   7. There is calcification of the mitral valve annulus.   8. Moderate mitral regurgitation.   9. Calcification of the aortic valve leaflets.  10. Mild tricuspid regurgitation.  11. Estimated pulmonary artery systolic pressure is 31 mmHg, consistent with normal pulmonary artery pressure.  12. Trace pericardial effusion.    ________________________________________________________________________________________  FINDINGS:     Left Ventricle:  Left ventricular systolic function is normal with an ejection fraction visually estimated at 55 to 60%. There are no regional wall motion abnormalities seen. The left ventricular diastolicfunction is indeterminate. Moderate left ventricular hypertrophy.     Right Ventricle:  The right ventricular cavity is normal in size, normal wall thickness and normal systolic function.     Left Atrium:  The left atrium is moderately dilated with an indexed volume of 47.25 ml/m².     Right Atrium:  The right atrium is moderately dilated in size with an indexed volume of 35.48 ml/m².     Aortic Valve:  The aortic valve appears trileaflet. There is calcification of the aortic valve leaflets. There is no aortic valve stenosis. There is no evidence of aortic regurgitation.     Mitral Valve:  There is mitral valve thickening of the anterior and posterior leaflets. There is calcification of the mitral valve annulus. There is moderate mitral regurgitation.     Tricuspid Valve:  Structurally normal tricuspid valve with normal leaflet excursion. There is mild tricuspid regurgitation. Estimated pulmonary artery systolic pressure is 31 mmHg, consistent with normal pulmonary artery pressure.     Pulmonic Valve:  Structurally normal pulmonic valve with normal leaflet excursion.     Aorta:  The aortic root at the sinuses of Valsalva is normal in size, measuring 3.60 cm (indexed 1.49 cm/m²). The aortic arch diameter is normal in size, measuring 2.3 cm (indexed 0.95 cm/m²).     Pericardium:  There is a trace pericardial effusion.     Systemic Veins:  The inferior vena cava is normal in size measuring 1.91 cm in diameter, (normal <2.1cm) with normal inspiratory collapse (normal >50%) consistent with normal right atrial pressure (~3, range 0-5mmHg).  ____________________________________________________________________  QUANTITATIVE DATA:  Left Ventricle Measurements: (Indexed to BSA)     IVSd (2D):   1.4 cm  LVPWd (2D):  1.4 cm  LVIDd (2D):  4.8 cm  LVIDs (2D):  3.3 cm  LV Mass:     283 g  117.1 g/m²  Visualized LV EF%: 55 to 60%     MV E Vmax:    1.49 m/s  e' lateral:   11.90 cm/s  e' medial:    8.38 cm/s  E/e' lateral: 12.52  E/e' medial:  17.78  E/e' Average: 14.69  MV DT:        102 msec    Aorta Measurements: (normal range) (Indexed to BSA)     Sinuses of Valsalva: 3.60 cm (3.1 - 3.7 cm)  Ao Arch:             2.3 cm       Left Atrium Measurements: (Indexed to BSA)  LA Diam 2D: 5.00 cm    Right Atrial Measurements:     RA Vol:  85.60 ml  RA Vol Index: 35.48 ml/m²    Mitral Valve Measurements:     MV E Vmax: 1.5 m/s         MR Vmax:          4.64 m/s                             MR Peak Gradient: 86.1 mmHg       Tricuspid Valve Measurements:     TR Vmax:          2.7 m/s  TR Peak Gradient: 28.3 mmHg  RA Pressure:      3 mmHg  PASP:             31 mmHg    ________________________________________________________________________________________  Electronically signed on 12/23/2023 at 3:54:22 PM by Jeovany France         *** Final ***      [facility] -- Brittany Lutz Pannella, Patel, Savella, Goodger, Cohen  Office # [phone]    Follow Up:  Afib     Subjective/Observations: seen and examined, awake, alert, resting comfortably in bed, denies chest pain, dyspnea, palpitations or dizziness, orthopnea and PND. on O2 via NC     REVIEW OF SYSTEMS: All other review of systems is negative unless indicated above  PAST MEDICAL & SURGICAL HISTORY:  Diabetes      Diabetes mellitus with no complication      Afib      Hypertension      BPH (benign prostatic hyperplasia)      Perforated gastric ulcer  s/p emergent ex-lap omentopexy and plication 6/2019      Pulmonary embolism      History of non-ST elevation myocardial infarction (NSTEMI)      Osteomyelitis  s/p debridement      CAD S/P percutaneous coronary angioplasty      Cerebrovascular accident      H/O abdominal surgery      Perforated gastric ulcer      Traumatic amputation of left foot, initial encounter        MEDICATIONS  (STANDING):  apixaban 5 milliGRAM(s) Oral every 12 hours  aspirin  chewable 81 milliGRAM(s) Oral daily  atorvastatin 40 milliGRAM(s) Oral at bedtime  bethanechol 25 milliGRAM(s) Oral three times a day  cefTRIAXone   IVPB 2000 milliGRAM(s) IV Intermittent every 24 hours  cholecalciferol 1000 Unit(s) Oral daily  dextrose 50% Injectable 12.5 Gram(s) IV Push once  dextrose 50% Injectable 25 Gram(s) IV Push once  dextrose 50% Injectable 25 Gram(s) IV Push once  dextrose Oral Gel 15 Gram(s) Oral once  diltiazem    Tablet 60 milliGRAM(s) Oral every 6 hours  famotidine    Tablet 20 milliGRAM(s) Oral daily  furosemide    Tablet 40 milliGRAM(s) Oral two times a day  gabapentin 300 milliGRAM(s) Oral two times a day  glucagon  Injectable 1 milliGRAM(s) IntraMuscular once  insulin glargine Injectable (LANTUS) 10 Unit(s) SubCutaneous at bedtime  insulin lispro (ADMELOG) corrective regimen sliding scale   SubCutaneous three times a day before meals  insulin lispro (ADMELOG) corrective regimen sliding scale   SubCutaneous at bedtime  isosorbide   mononitrate ER Tablet (IMDUR) 60 milliGRAM(s) Oral daily  losartan 100 milliGRAM(s) Oral daily  melatonin 5 milliGRAM(s) Oral at bedtime  methimazole 10 milliGRAM(s) Oral daily  metoclopramide 5 milliGRAM(s) Oral three times a day  metoprolol tartrate 50 milliGRAM(s) Oral every 8 hours  multivitamin/minerals 1 Tablet(s) Oral daily  oxybutynin 10 milliGRAM(s) Oral two times a day  oxyCODONE  ER Tablet 10 milliGRAM(s) Oral every 12 hours  pantoprazole    Tablet 40 milliGRAM(s) Oral before breakfast  phenazopyridine 100 milliGRAM(s) Oral every 8 hours  polyethylene glycol 3350 17 Gram(s) Oral two times a day  potassium chloride    Tablet ER 10 milliEquivalent(s) Oral daily  saccharomyces boulardii 250 milliGRAM(s) Oral two times a day  senna 2 Tablet(s) Oral at bedtime  sucralfate 1 Gram(s) Oral four times a day  tamsulosin 0.8 milliGRAM(s) Oral at bedtime    MEDICATIONS  (PRN):  acetaminophen     Tablet .. 650 milliGRAM(s) Oral every 6 hours PRN Temp greater or equal to 38C (100.4F), Mild Pain (1 - 3)  bisacodyl 5 milliGRAM(s) Oral every 12 hours PRN Constipation  magnesium hydroxide Suspension 30 milliLiter(s) Oral at bedtime PRN Constipation  sodium chloride 0.65% Nasal 1 Spray(s) Both Nostrils three times a day PRN Nasal Congestion    Allergies    No Known Drug Allergies  fish (Hives)    Intolerances      Vital Signs Last 24 Hrs  T(C): 36.7 (24 Dec 2023 11:19), Max: 36.9 (23 Dec 2023 11:36)  T(F): 98 (24 Dec 2023 11:19), Max: 98.4 (23 Dec 2023 11:36)  HR: 83 (24 Dec 2023 11:19) (73 - 95)  BP: 114/71 (24 Dec 2023 11:19) (88/58 - 124/78)  BP(mean): --  RR: 18 (24 Dec 2023 11:19) (16 - 18)  SpO2: 91% (24 Dec 2023 11:19) (91% - 96%)    Parameters below as of 24 Dec 2023 11:19  Patient On (Oxygen Delivery Method): nasal cannula  O2 Flow (L/min): 2    I&O's Summary    23 Dec 2023 07:01  -  24 Dec 2023 07:00  --------------------------------------------------------  IN: 150 mL / OUT: 2850 mL / NET: -2700 mL          TELE: Not on telemetry   PHYSICAL EXAM:  Constitutional: NAD, awake and alert  HEENT: Moist Mucous Membranes, Anicteric  Pulmonary: Non-labored, breath sounds are clear bilaterally, No wheezing, rales or rhonchi  Cardiovascular:Regular, S1 and S2, No murmurs, rubs, gallops or clicks  Gastrointestinal: Bowel Sounds present, soft, nontender.   Lymph: + L edema (chronic) No lymphadenopathy.  Skin: No visible rashes or ulcers. left foot stump  Psych:  Mood & affect appropriate  LABS: All Labs Reviewed:                        10.7   8.66  )-----------( 119      ( 22 Dec 2023 07:08 )             33.0     22 Dec 2023 07:08    140    |  110    |  20     ----------------------------<  189    3.8     |  24     |  0.99     Ca    7.8        22 Dec 2023 07:08    TPro  6.3    /  Alb  2.4    /  TBili  0.4    /  DBili  x      /  AST  38     /  ALT  24     /  AlkPhos  92     22 Dec 2023 07:08          12 Lead ECG:   Ventricular Rate 164 BPM    QRS Duration 78 ms    Q-T Interval 232 ms    QTC Calculation(Bazett) 383 ms    R Axis 3 degrees    T Axis 60 degrees    Diagnosis Line *** Critical Test Result: High HR  Atrial fibrillation with rapid ventricular response  Low voltage QRS  Abnormal ECG  When compared with ECG of 16-JUN-2021 08:29,  Atrial fibrillation has replaced Sinus rhythm  Vent. rate has increased BY  97 BPM  Nonspecific T wave abnormality now evident in Lateral leads  Confirmed by Juventino Soto MD ([id number]) on 12/20/2023 2:27:15 PM (12-20-23 @ 08:11)      TRANSTHORACIC ECHOCARDIOGRAM REPORT  ________________________________________________________________________________                                      _______       Pt. Name:       SARAH MORRISON Study Date:    12/23/2023  MRN:            [id number]         YOB: 1970  Accession #:    [id number]        Age:           53 years  Account#:       [id number]       Gender:        M  Heart Rate:                      Height:        74.02 in (188.00 cm)  Rhythm:Weight:        255.73 lb (116.00 kg)  Blood Pressure: 102/58 mmHg      BSA/BMI:       2.41 m² / 32.82 kg/m²  ________________________________________________________________________________________  Referring Physician:    [id number] Hill Brizuela  Interpreting Physician: Jeovany France  Primary Sonographer:    Heidi Redd [facility]    CPT:               ECHO TTE WO CON COMP W DOPP - 25641.m  Indication(s):     Abnormal electrocardiogram ECG/EKG - R94.31  Procedure:         Transthoracic echocardiogram with 2-D, M-mode and complete                     spectral and color flow Doppler.  Ordering Location: [facility]    _______________________________________________________________________________________     CONCLUSIONS:      1. Left ventricular systolic function is normal with an ejection fraction visually estimated at 55 to 60 %.   2. Moderate left ventricular hypertrophy.   3. The left ventricular diastolic function is indeterminate.   4. Normal right ventricular cavity size, wall thickness, and systolic function.   5. The left atrium is moderately dilated.   6. The right atrium is moderately dilated in size.   7. There is calcification of the mitral valve annulus.   8. Moderate mitral regurgitation.   9. Calcification of the aortic valve leaflets.  10. Mild tricuspid regurgitation.  11. Estimated pulmonary artery systolic pressure is 31 mmHg, consistent with normal pulmonary artery pressure.  12. Trace pericardial effusion.    ________________________________________________________________________________________  FINDINGS:     Left Ventricle:  Left ventricular systolic function is normal with an ejection fraction visually estimated at 55 to 60%. There are no regional wall motion abnormalities seen. The left ventricular diastolicfunction is indeterminate. Moderate left ventricular hypertrophy.     Right Ventricle:  The right ventricular cavity is normal in size, normal wall thickness and normal systolic function.     Left Atrium:  The left atrium is moderately dilated with an indexed volume of 47.25 ml/m².     Right Atrium:  The right atrium is moderately dilated in size with an indexed volume of 35.48 ml/m².     Aortic Valve:  The aortic valve appears trileaflet. There is calcification of the aortic valve leaflets. There is no aortic valve stenosis. There is no evidence of aortic regurgitation.     Mitral Valve:  There is mitral valve thickening of the anterior and posterior leaflets. There is calcification of the mitral valve annulus. There is moderate mitral regurgitation.     Tricuspid Valve:  Structurally normal tricuspid valve with normal leaflet excursion. There is mild tricuspid regurgitation. Estimated pulmonary artery systolic pressure is 31 mmHg, consistent with normal pulmonary artery pressure.     Pulmonic Valve:  Structurally normal pulmonic valve with normal leaflet excursion.     Aorta:  The aortic root at the sinuses of Valsalva is normal in size, measuring 3.60 cm (indexed 1.49 cm/m²). The aortic arch diameter is normal in size, measuring 2.3 cm (indexed 0.95 cm/m²).     Pericardium:  There is a trace pericardial effusion.     Systemic Veins:  The inferior vena cava is normal in size measuring 1.91 cm in diameter, (normal <2.1cm) with normal inspiratory collapse (normal >50%) consistent with normal right atrial pressure (~3, range 0-5mmHg).  ____________________________________________________________________  QUANTITATIVE DATA:  Left Ventricle Measurements: (Indexed to BSA)     IVSd (2D):   1.4 cm  LVPWd (2D):  1.4 cm  LVIDd (2D):  4.8 cm  LVIDs (2D):  3.3 cm  LV Mass:     283 g  117.1 g/m²  Visualized LV EF%: 55 to 60%     MV E Vmax:    1.49 m/s  e' lateral:   11.90 cm/s  e' medial:    8.38 cm/s  E/e' lateral: 12.52  E/e' medial:  17.78  E/e' Average: 14.69  MV DT:        102 msec    Aorta Measurements: (normal range) (Indexed to BSA)     Sinuses of Valsalva: 3.60 cm (3.1 - 3.7 cm)  Ao Arch:             2.3 cm       Left Atrium Measurements: (Indexed to BSA)  LA Diam 2D: 5.00 cm    Right Atrial Measurements:     RA Vol:  85.60 ml  RA Vol Index: 35.48 ml/m²    Mitral Valve Measurements:     MV E Vmax: 1.5 m/s         MR Vmax:          4.64 m/s                             MR Peak Gradient: 86.1 mmHg       Tricuspid Valve Measurements:     TR Vmax:          2.7 m/s  TR Peak Gradient: 28.3 mmHg  RA Pressure:      3 mmHg  PASP:             31 mmHg    ________________________________________________________________________________________  Electronically signed on 12/23/2023 at 3:54:22 PM by Jeovany France         *** Final ***

## 2023-12-25 LAB
GLUCOSE BLDC GLUCOMTR-MCNC: 181 MG/DL — HIGH (ref 70–99)
GLUCOSE BLDC GLUCOMTR-MCNC: 181 MG/DL — HIGH (ref 70–99)
GLUCOSE BLDC GLUCOMTR-MCNC: 184 MG/DL — HIGH (ref 70–99)
GLUCOSE BLDC GLUCOMTR-MCNC: 184 MG/DL — HIGH (ref 70–99)
GLUCOSE BLDC GLUCOMTR-MCNC: 221 MG/DL — HIGH (ref 70–99)
GLUCOSE BLDC GLUCOMTR-MCNC: 221 MG/DL — HIGH (ref 70–99)
GLUCOSE BLDC GLUCOMTR-MCNC: 234 MG/DL — HIGH (ref 70–99)
GLUCOSE BLDC GLUCOMTR-MCNC: 234 MG/DL — HIGH (ref 70–99)

## 2023-12-25 PROCEDURE — 99232 SBSQ HOSP IP/OBS MODERATE 35: CPT

## 2023-12-25 RX ADMIN — Medication 500 MILLIGRAM(S): at 18:31

## 2023-12-25 RX ADMIN — Medication 500 MILLIGRAM(S): at 06:22

## 2023-12-25 RX ADMIN — Medication 100 MILLIGRAM(S): at 13:55

## 2023-12-25 RX ADMIN — Medication 5 UNIT(S): at 18:04

## 2023-12-25 RX ADMIN — Medication 60 MILLIGRAM(S): at 22:09

## 2023-12-25 RX ADMIN — Medication 4: at 18:03

## 2023-12-25 RX ADMIN — Medication 100 MILLIGRAM(S): at 22:10

## 2023-12-25 RX ADMIN — OXYCODONE HYDROCHLORIDE 10 MILLIGRAM(S): 5 TABLET ORAL at 18:28

## 2023-12-25 RX ADMIN — Medication 250 MILLIGRAM(S): at 06:23

## 2023-12-25 RX ADMIN — Medication 250 MILLIGRAM(S): at 18:30

## 2023-12-25 RX ADMIN — Medication 5 UNIT(S): at 12:46

## 2023-12-25 RX ADMIN — ATORVASTATIN CALCIUM 40 MILLIGRAM(S): 80 TABLET, FILM COATED ORAL at 22:10

## 2023-12-25 RX ADMIN — Medication 5 MILLIGRAM(S): at 13:55

## 2023-12-25 RX ADMIN — Medication 1 GRAM(S): at 22:09

## 2023-12-25 RX ADMIN — Medication 100 MILLIGRAM(S): at 06:22

## 2023-12-25 RX ADMIN — Medication 1 GRAM(S): at 11:39

## 2023-12-25 RX ADMIN — ISOSORBIDE MONONITRATE 60 MILLIGRAM(S): 60 TABLET, EXTENDED RELEASE ORAL at 11:47

## 2023-12-25 RX ADMIN — POLYETHYLENE GLYCOL 3350 17 GRAM(S): 17 POWDER, FOR SOLUTION ORAL at 06:23

## 2023-12-25 RX ADMIN — LOSARTAN POTASSIUM 100 MILLIGRAM(S): 100 TABLET, FILM COATED ORAL at 06:23

## 2023-12-25 RX ADMIN — Medication 1 GRAM(S): at 06:22

## 2023-12-25 RX ADMIN — Medication 5 MILLIGRAM(S): at 22:10

## 2023-12-25 RX ADMIN — SENNA PLUS 2 TABLET(S): 8.6 TABLET ORAL at 22:10

## 2023-12-25 RX ADMIN — APIXABAN 5 MILLIGRAM(S): 2.5 TABLET, FILM COATED ORAL at 06:22

## 2023-12-25 RX ADMIN — Medication 60 MILLIGRAM(S): at 06:21

## 2023-12-25 RX ADMIN — Medication 25 MILLIGRAM(S): at 13:55

## 2023-12-25 RX ADMIN — Medication 1 GRAM(S): at 18:31

## 2023-12-25 RX ADMIN — GABAPENTIN 300 MILLIGRAM(S): 400 CAPSULE ORAL at 06:23

## 2023-12-25 RX ADMIN — Medication 50 MILLIGRAM(S): at 22:10

## 2023-12-25 RX ADMIN — GABAPENTIN 300 MILLIGRAM(S): 400 CAPSULE ORAL at 18:29

## 2023-12-25 RX ADMIN — Medication 40 MILLIGRAM(S): at 06:23

## 2023-12-25 RX ADMIN — Medication 4: at 12:46

## 2023-12-25 RX ADMIN — Medication 1 TABLET(S): at 11:38

## 2023-12-25 RX ADMIN — Medication 5 UNIT(S): at 08:32

## 2023-12-25 RX ADMIN — Medication 5 MILLIGRAM(S): at 06:22

## 2023-12-25 RX ADMIN — OXYCODONE HYDROCHLORIDE 10 MILLIGRAM(S): 5 TABLET ORAL at 06:23

## 2023-12-25 RX ADMIN — OXYCODONE HYDROCHLORIDE 10 MILLIGRAM(S): 5 TABLET ORAL at 07:17

## 2023-12-25 RX ADMIN — TAMSULOSIN HYDROCHLORIDE 0.8 MILLIGRAM(S): 0.4 CAPSULE ORAL at 22:10

## 2023-12-25 RX ADMIN — FAMOTIDINE 20 MILLIGRAM(S): 10 INJECTION INTRAVENOUS at 11:39

## 2023-12-25 RX ADMIN — Medication 10 MILLIEQUIVALENT(S): at 11:38

## 2023-12-25 RX ADMIN — Medication 25 MILLIGRAM(S): at 22:09

## 2023-12-25 RX ADMIN — POLYETHYLENE GLYCOL 3350 17 GRAM(S): 17 POWDER, FOR SOLUTION ORAL at 18:28

## 2023-12-25 RX ADMIN — Medication 2: at 08:31

## 2023-12-25 RX ADMIN — Medication 60 MILLIGRAM(S): at 11:38

## 2023-12-25 RX ADMIN — Medication 10 MILLIGRAM(S): at 18:29

## 2023-12-25 RX ADMIN — APIXABAN 5 MILLIGRAM(S): 2.5 TABLET, FILM COATED ORAL at 18:31

## 2023-12-25 RX ADMIN — Medication 60 MILLIGRAM(S): at 18:31

## 2023-12-25 RX ADMIN — Medication 0: at 22:11

## 2023-12-25 RX ADMIN — Medication 500 MILLIGRAM(S): at 22:10

## 2023-12-25 RX ADMIN — Medication 1000 UNIT(S): at 11:39

## 2023-12-25 RX ADMIN — Medication 25 MILLIGRAM(S): at 06:22

## 2023-12-25 RX ADMIN — Medication 10 MILLIGRAM(S): at 06:22

## 2023-12-25 RX ADMIN — Medication 81 MILLIGRAM(S): at 11:38

## 2023-12-25 RX ADMIN — Medication 500 MILLIGRAM(S): at 11:38

## 2023-12-25 RX ADMIN — PANTOPRAZOLE SODIUM 40 MILLIGRAM(S): 20 TABLET, DELAYED RELEASE ORAL at 06:23

## 2023-12-25 RX ADMIN — Medication 50 MILLIGRAM(S): at 06:22

## 2023-12-25 RX ADMIN — INSULIN GLARGINE 15 UNIT(S): 100 INJECTION, SOLUTION SUBCUTANEOUS at 22:11

## 2023-12-25 RX ADMIN — OXYCODONE HYDROCHLORIDE 10 MILLIGRAM(S): 5 TABLET ORAL at 18:58

## 2023-12-25 NOTE — PROGRESS NOTE ADULT - SUBJECTIVE AND OBJECTIVE BOX
Date/Time Patient Seen:  		  Referring MD:   Data Reviewed	       Patient is a 53y old  Male who presents with a chief complaint of UTI (24 Dec 2023 11:05)      Subjective/HPI     PAST MEDICAL & SURGICAL HISTORY:  No pertinent past medical history    Diabetes    No pertinent past medical history    Diabetes mellitus with no complication    Afib    Hypertension    BPH (benign prostatic hyperplasia)    Perforated gastric ulcer  s/p emergent ex-lap omentopexy and plication 6/2019    Pulmonary embolism    History of non-ST elevation myocardial infarction (NSTEMI)    Osteomyelitis  s/p debridement    CAD S/P percutaneous coronary angioplasty    Cerebrovascular accident    No significant past surgical history    No significant past surgical history    H/O abdominal surgery    Perforated gastric ulcer    Traumatic amputation of left foot, initial encounter          Medication list         MEDICATIONS  (STANDING):  apixaban 5 milliGRAM(s) Oral every 12 hours  aspirin  chewable 81 milliGRAM(s) Oral daily  atorvastatin 40 milliGRAM(s) Oral at bedtime  bethanechol 25 milliGRAM(s) Oral three times a day  cephalexin 500 milliGRAM(s) Oral four times a day  cholecalciferol 1000 Unit(s) Oral daily  dextrose 50% Injectable 25 Gram(s) IV Push once  dextrose 50% Injectable 12.5 Gram(s) IV Push once  dextrose 50% Injectable 25 Gram(s) IV Push once  dextrose Oral Gel 15 Gram(s) Oral once  diltiazem    Tablet 60 milliGRAM(s) Oral every 6 hours  famotidine    Tablet 20 milliGRAM(s) Oral daily  furosemide    Tablet 40 milliGRAM(s) Oral two times a day  gabapentin 300 milliGRAM(s) Oral two times a day  glucagon  Injectable 1 milliGRAM(s) IntraMuscular once  insulin glargine Injectable (LANTUS) 15 Unit(s) SubCutaneous at bedtime  insulin lispro (ADMELOG) corrective regimen sliding scale   SubCutaneous three times a day before meals  insulin lispro (ADMELOG) corrective regimen sliding scale   SubCutaneous at bedtime  insulin lispro Injectable (ADMELOG) 5 Unit(s) SubCutaneous three times a day before meals  isosorbide   mononitrate ER Tablet (IMDUR) 60 milliGRAM(s) Oral daily  losartan 100 milliGRAM(s) Oral daily  melatonin 5 milliGRAM(s) Oral at bedtime  methimazole 10 milliGRAM(s) Oral daily  metoclopramide 5 milliGRAM(s) Oral three times a day  metoprolol tartrate 50 milliGRAM(s) Oral every 8 hours  multivitamin/minerals 1 Tablet(s) Oral daily  oxybutynin 10 milliGRAM(s) Oral two times a day  oxyCODONE  ER Tablet 10 milliGRAM(s) Oral every 12 hours  pantoprazole    Tablet 40 milliGRAM(s) Oral before breakfast  phenazopyridine 100 milliGRAM(s) Oral every 8 hours  polyethylene glycol 3350 17 Gram(s) Oral two times a day  potassium chloride    Tablet ER 10 milliEquivalent(s) Oral daily  saccharomyces boulardii 250 milliGRAM(s) Oral two times a day  senna 2 Tablet(s) Oral at bedtime  sucralfate 1 Gram(s) Oral four times a day  tamsulosin 0.8 milliGRAM(s) Oral at bedtime    MEDICATIONS  (PRN):  acetaminophen     Tablet .. 650 milliGRAM(s) Oral every 6 hours PRN Temp greater or equal to 38C (100.4F), Mild Pain (1 - 3)  bisacodyl 5 milliGRAM(s) Oral every 12 hours PRN Constipation  magnesium hydroxide Suspension 30 milliLiter(s) Oral at bedtime PRN Constipation  sodium chloride 0.65% Nasal 1 Spray(s) Both Nostrils three times a day PRN Nasal Congestion         Vitals log        ICU Vital Signs Last 24 Hrs  T(C): 36.6 (25 Dec 2023 04:57), Max: 36.7 (24 Dec 2023 11:19)  T(F): 97.9 (25 Dec 2023 04:57), Max: 98 (24 Dec 2023 11:19)  HR: 88 (25 Dec 2023 04:57) (56 - 88)  BP: 108/65 (25 Dec 2023 04:57) (106/71 - 116/76)  BP(mean): --  ABP: --  ABP(mean): --  RR: 18 (25 Dec 2023 04:57) (18 - 18)  SpO2: 94% (25 Dec 2023 04:57) (91% - 94%)    O2 Parameters below as of 25 Dec 2023 04:57  Patient On (Oxygen Delivery Method): nasal cannula                 Input and Output:  I&O's Detail    23 Dec 2023 07:01  -  24 Dec 2023 07:00  --------------------------------------------------------  IN:    sodium chloride 0.9%: 150 mL  Total IN: 150 mL    OUT:    Indwelling Catheter - Urethral (mL): 2850 mL  Total OUT: 2850 mL    Total NET: -2700 mL          Lab Data                  Review of Systems	      Objective     Physical Examination    heart s1s2  lung dc BS  head nc      Pertinent Lab findings & Imaging      Ale:  NO   Adequate UO     I&O's Detail    23 Dec 2023 07:01  -  24 Dec 2023 07:00  --------------------------------------------------------  IN:    sodium chloride 0.9%: 150 mL  Total IN: 150 mL    OUT:    Indwelling Catheter - Urethral (mL): 2850 mL  Total OUT: 2850 mL    Total NET: -2700 mL               Discussed with:     Cultures:	        Radiology

## 2023-12-25 NOTE — PROGRESS NOTE ADULT - PROBLEM SELECTOR PLAN 3
insulin per endo  endo eval dr C Perlman appreciated
insulin  endo eval dr C Perlman appreciated
insulin per endo  endo eval dr C Perlman appreciated
insulin  endo eval dr C Perlman appreciated
insulin per endo  endo eval dr C Perlman appreciated

## 2023-12-25 NOTE — PROGRESS NOTE ADULT - NS ATTEND AMEND GEN_ALL_CORE FT
Patient is a 52yo M with a PMH of CVA, chronic PE (on Eliquis), atrial fibrillation, hypertension, BPH, type 2 diabetes, chronic Aguilera, hyperlipidemia, GERD, CAD (s/p stent to RCA), peripheral vascular disease, hypothyroid who presents to the ED for SOB, chills and lethargy, found to be in rapid Afib w/ RVR.     nh resident  hx of paf, but does not have definite sxs, and unclear burden of af  previously on amio, stopped for thyroid issues  now with rapid af in the setting of apparent recurrent urinary infection   s/p diltiazem gtt, now po  cont bb  ac  mild elev trop  mild vol ol, cont po lasix and can increase if needed  repeat echo with normal LV function, mod mr  titrate off 02. Patient is a 54yo M with a PMH of CVA, chronic PE (on Eliquis), atrial fibrillation, hypertension, BPH, type 2 diabetes, chronic Aguilera, hyperlipidemia, GERD, CAD (s/p stent to RCA), peripheral vascular disease, hypothyroid who presents to the ED for SOB, chills and lethargy, found to be in rapid Afib w/ RVR.     nh resident  hx of paf, but does not have definite sxs, and unclear burden of af  previously on amio, stopped for thyroid issues  now with rapid af in the setting of apparent recurrent urinary infection   s/p diltiazem gtt, now po  cont bb  ac  mild elev trop  mild vol ol, cont po lasix and can increase if needed  repeat echo with normal LV function, mod mr  titrate off 02.

## 2023-12-25 NOTE — PROGRESS NOTE ADULT - PROBLEM SELECTOR PLAN 1
S/P iv zosyn, iv fluids, now on rocephin  follow up cultures  ID eval dr Whitten et al appreciated
cont lantus 10 units qhs  cont admelog corrective scale coverage qac/qhs  cont cons cho diet  goal bg 100-180 in hosp setting
S/P iv rocephin now on Keflex x 6 days  Repeat blood cultures NTD  ID eval dr Whitten et al appreciated
increase lantus 15 units qhs  add admelog 5 units 3x/day before meals  cont mod dose admelog corrective scale coverage qac/qhs  cont cons cho diet  goal bg 100-180 in hosp setting
iv zosyn, iv fluids  follow up cultures  ID eval dr Whitten et al appreciated
S/P iv zosyn, iv fluids, now on rocephin  check final cultures  ID eval dr Whitten et al appreciated
S/P iv rocephin now on Keflex x 6 days  Repeat blood cultures NTD  ID eval dr Whitten et al appreciated

## 2023-12-25 NOTE — PROGRESS NOTE ADULT - SUBJECTIVE AND OBJECTIVE BOX
Date of Service: 12-25-23 @ 11:59    Patient is a 53y old  Male who presents with a chief complaint of UTI (24 Dec 2023 11:05)      INTERVAL HPI/OVERNIGHT EVENTS: Patient seen and examined. NAD. No complaints.    Vital Signs Last 24 Hrs  T(C): 36.6 (25 Dec 2023 04:57), Max: 36.6 (24 Dec 2023 20:28)  T(F): 97.9 (25 Dec 2023 04:57), Max: 97.9 (24 Dec 2023 23:00)  HR: 88 (25 Dec 2023 04:57) (56 - 88)  BP: 108/65 (25 Dec 2023 04:57) (106/71 - 116/76)  BP(mean): --  RR: 18 (25 Dec 2023 04:57) (18 - 18)  SpO2: 94% (25 Dec 2023 04:57) (91% - 94%)    Parameters below as of 25 Dec 2023 04:57  Patient On (Oxygen Delivery Method): nasal cannula                  CAPILLARY BLOOD GLUCOSE      POCT Blood Glucose.: 181 mg/dL (25 Dec 2023 07:32)  POCT Blood Glucose.: 197 mg/dL (24 Dec 2023 22:56)  POCT Blood Glucose.: 221 mg/dL (24 Dec 2023 20:48)  POCT Blood Glucose.: 189 mg/dL (24 Dec 2023 16:48)  POCT Blood Glucose.: 225 mg/dL (24 Dec 2023 12:22)              acetaminophen     Tablet .. 650 milliGRAM(s) Oral every 6 hours PRN  apixaban 5 milliGRAM(s) Oral every 12 hours  aspirin  chewable 81 milliGRAM(s) Oral daily  atorvastatin 40 milliGRAM(s) Oral at bedtime  bethanechol 25 milliGRAM(s) Oral three times a day  bisacodyl 5 milliGRAM(s) Oral every 12 hours PRN  cephalexin 500 milliGRAM(s) Oral four times a day  cholecalciferol 1000 Unit(s) Oral daily  dextrose 50% Injectable 25 Gram(s) IV Push once  dextrose 50% Injectable 25 Gram(s) IV Push once  dextrose 50% Injectable 12.5 Gram(s) IV Push once  dextrose Oral Gel 15 Gram(s) Oral once  diltiazem    Tablet 60 milliGRAM(s) Oral every 6 hours  famotidine    Tablet 20 milliGRAM(s) Oral daily  furosemide    Tablet 40 milliGRAM(s) Oral two times a day  gabapentin 300 milliGRAM(s) Oral two times a day  glucagon  Injectable 1 milliGRAM(s) IntraMuscular once  insulin glargine Injectable (LANTUS) 15 Unit(s) SubCutaneous at bedtime  insulin lispro (ADMELOG) corrective regimen sliding scale   SubCutaneous three times a day before meals  insulin lispro (ADMELOG) corrective regimen sliding scale   SubCutaneous at bedtime  insulin lispro Injectable (ADMELOG) 5 Unit(s) SubCutaneous three times a day before meals  isosorbide   mononitrate ER Tablet (IMDUR) 60 milliGRAM(s) Oral daily  losartan 100 milliGRAM(s) Oral daily  magnesium hydroxide Suspension 30 milliLiter(s) Oral at bedtime PRN  melatonin 5 milliGRAM(s) Oral at bedtime  methimazole 10 milliGRAM(s) Oral daily  metoclopramide 5 milliGRAM(s) Oral three times a day  metoprolol tartrate 50 milliGRAM(s) Oral every 8 hours  multivitamin/minerals 1 Tablet(s) Oral daily  oxybutynin 10 milliGRAM(s) Oral two times a day  oxyCODONE  ER Tablet 10 milliGRAM(s) Oral every 12 hours  pantoprazole    Tablet 40 milliGRAM(s) Oral before breakfast  phenazopyridine 100 milliGRAM(s) Oral every 8 hours  polyethylene glycol 3350 17 Gram(s) Oral two times a day  potassium chloride    Tablet ER 10 milliEquivalent(s) Oral daily  saccharomyces boulardii 250 milliGRAM(s) Oral two times a day  senna 2 Tablet(s) Oral at bedtime  sodium chloride 0.65% Nasal 1 Spray(s) Both Nostrils three times a day PRN  sucralfate 1 Gram(s) Oral four times a day  tamsulosin 0.8 milliGRAM(s) Oral at bedtime              REVIEW OF SYSTEMS:  CONSTITUTIONAL: No fever, no weight loss, or no fatigue  NECK: No pain, no stiffness  RESPIRATORY: No cough, no wheezing, no chills, no hemoptysis, No shortness of breath  CARDIOVASCULAR: No chest pain, no palpitations, no dizziness, no leg swelling  GASTROINTESTINAL: No abdominal pain. No nausea, no vomiting, no hematemesis; No diarrhea, no constipation. No melena, no hematochezia.  GENITOURINARY: No dysuria, no frequency, no hematuria, no incontinence  NEUROLOGICAL: No headaches, no loss of strength, no numbness, no tremors  SKIN: No itching, no burning  MUSCULOSKELETAL: No joint pain, no swelling; No muscle, no back, no extremity pain  PSYCHIATRIC: No depression, no mood swings,   HEME/LYMPH: No easy bruising, no bleeding gums  ALLERY AND IMMUNOLOGIC: No hives       Consultant(s) Notes Reviewed:  [X] YES  [ ] NO    PHYSICAL EXAM:  GENERAL: NAD  HEAD:  Atraumatic, Normocephalic  EYES: EOMI, PERRLA, conjunctiva and sclera clear  ENMT: No tonsillar erythema, exudates, or enlargement; Moist mucous membranes  NECK: Supple, No JVD  NERVOUS SYSTEM:  Awake & alert  CHEST/LUNG: Clear to auscultation bilaterally; No rales, rhonchi, wheezing,  HEART: Regular rate and rhythm  ABDOMEN: Soft, Nontender, Nondistended; Bowel sounds present  EXTREMITIES:  No clubbing, cyanosis, or edema  LYMPH: No lymphadenopathy noted  SKIN: No rashes      Advanced care planning discussed with patient/family [X] YES   [ ] NO    Advanced care planning discussed with patient/family. Patient's health status was discussed. All appropriate changes have been made regarding patient's end-of-life care. Advanced care planning forms reviewed/discussed/completed.  20 minutes spent.

## 2023-12-25 NOTE — PROGRESS NOTE ADULT - PROBLEM SELECTOR PROBLEM 1
Diabetes
Diabetes
Sepsis secondary to UTI

## 2023-12-25 NOTE — PROGRESS NOTE ADULT - PROBLEM SELECTOR PLAN 2
cont apixaban for AC  cont metoprolol  S/P cardizem gtt  cardio eval dr Soto et al appreciated
cont apixaban  cont metoprolol  cardizem gtt  cardio eval dr Soto et al appreciated
cont methimazole 10mg daily
cont apixaban for AC  cont metoprolol  S/P cardizem gtt  cardio eval dr Soto et al appreciated
cont apixaban  cont metoprolol  cardizem gtt  cardio eval dr Soto et al appreciated
cont apixaban for AC  cont metoprolol  S/P cardizem gtt  cardio eval dr Soto et al appreciated

## 2023-12-25 NOTE — PROVIDER CONTACT NOTE (MEDICATION) - SITUATION
Pt BP SYS 94-96/71. Pt has lopressor ordered with parameters hold medication if SYS BP <90. Concerned BP will drop even more.

## 2023-12-25 NOTE — PROGRESS NOTE ADULT - SUBJECTIVE AND OBJECTIVE BOX
[facility] -- Brittany Lutz Pannella, Patel, Savella, Goodger, Cohen  Office # [phone]    Follow Up:  AFib     Subjective/Observations: seen and examined, awake, alert, resting comfortably in bed, denies chest pain, dyspnea, palpitations or dizziness, orthopnea and PND. on O2 via NC,  no events overnight       REVIEW OF SYSTEMS: All other review of systems is negative unless indicated above  PAST MEDICAL & SURGICAL HISTORY:  Diabetes      Diabetes mellitus with no complication      Afib      Hypertension      BPH (benign prostatic hyperplasia)      Perforated gastric ulcer  s/p emergent ex-lap omentopexy and plication 6/2019      Pulmonary embolism      History of non-ST elevation myocardial infarction (NSTEMI)      Osteomyelitis  s/p debridement      CAD S/P percutaneous coronary angioplasty      Cerebrovascular accident      H/O abdominal surgery      Perforated gastric ulcer      Traumatic amputation of left foot, initial encounter        MEDICATIONS  (STANDING):  apixaban 5 milliGRAM(s) Oral every 12 hours  aspirin  chewable 81 milliGRAM(s) Oral daily  atorvastatin 40 milliGRAM(s) Oral at bedtime  bethanechol 25 milliGRAM(s) Oral three times a day  cephalexin 500 milliGRAM(s) Oral four times a day  cholecalciferol 1000 Unit(s) Oral daily  dextrose 50% Injectable 25 Gram(s) IV Push once  dextrose 50% Injectable 25 Gram(s) IV Push once  dextrose 50% Injectable 12.5 Gram(s) IV Push once  dextrose Oral Gel 15 Gram(s) Oral once  diltiazem    Tablet 60 milliGRAM(s) Oral every 6 hours  famotidine    Tablet 20 milliGRAM(s) Oral daily  furosemide    Tablet 40 milliGRAM(s) Oral two times a day  gabapentin 300 milliGRAM(s) Oral two times a day  glucagon  Injectable 1 milliGRAM(s) IntraMuscular once  insulin glargine Injectable (LANTUS) 15 Unit(s) SubCutaneous at bedtime  insulin lispro (ADMELOG) corrective regimen sliding scale   SubCutaneous three times a day before meals  insulin lispro (ADMELOG) corrective regimen sliding scale   SubCutaneous at bedtime  insulin lispro Injectable (ADMELOG) 5 Unit(s) SubCutaneous three times a day before meals  isosorbide   mononitrate ER Tablet (IMDUR) 60 milliGRAM(s) Oral daily  losartan 100 milliGRAM(s) Oral daily  melatonin 5 milliGRAM(s) Oral at bedtime  methimazole 10 milliGRAM(s) Oral daily  metoclopramide 5 milliGRAM(s) Oral three times a day  metoprolol tartrate 50 milliGRAM(s) Oral every 8 hours  multivitamin/minerals 1 Tablet(s) Oral daily  oxybutynin 10 milliGRAM(s) Oral two times a day  oxyCODONE  ER Tablet 10 milliGRAM(s) Oral every 12 hours  pantoprazole    Tablet 40 milliGRAM(s) Oral before breakfast  phenazopyridine 100 milliGRAM(s) Oral every 8 hours  polyethylene glycol 3350 17 Gram(s) Oral two times a day  potassium chloride    Tablet ER 10 milliEquivalent(s) Oral daily  saccharomyces boulardii 250 milliGRAM(s) Oral two times a day  senna 2 Tablet(s) Oral at bedtime  sucralfate 1 Gram(s) Oral four times a day  tamsulosin 0.8 milliGRAM(s) Oral at bedtime    MEDICATIONS  (PRN):  acetaminophen     Tablet .. 650 milliGRAM(s) Oral every 6 hours PRN Temp greater or equal to 38C (100.4F), Mild Pain (1 - 3)  bisacodyl 5 milliGRAM(s) Oral every 12 hours PRN Constipation  magnesium hydroxide Suspension 30 milliLiter(s) Oral at bedtime PRN Constipation  sodium chloride 0.65% Nasal 1 Spray(s) Both Nostrils three times a day PRN Nasal Congestion    Allergies    No Known Drug Allergies  fish (Hives)    Intolerances      Vital Signs Last 24 Hrs  T(C): 36.6 (25 Dec 2023 04:57), Max: 36.7 (24 Dec 2023 11:19)  T(F): 97.9 (25 Dec 2023 04:57), Max: 98 (24 Dec 2023 11:19)  HR: 88 (25 Dec 2023 04:57) (56 - 88)  BP: 108/65 (25 Dec 2023 04:57) (106/71 - 116/76)  BP(mean): --  RR: 18 (25 Dec 2023 04:57) (18 - 18)  SpO2: 94% (25 Dec 2023 04:57) (91% - 94%)    Parameters below as of 25 Dec 2023 04:57  Patient On (Oxygen Delivery Method): nasal cannula      I&O's Summary        TELE: Not on telemetry   PHYSICAL EXAM:  Constitutional: NAD, awake and alert  HEENT: Moist Mucous Membranes, Anicteric  Pulmonary: Non-labored, breath sounds are clear bilaterally, No wheezing, rales or rhonchi  Cardiovascular:Regular, S1 and S2, No murmurs, rubs, gallops or clicks  Gastrointestinal: Bowel Sounds present, soft, nontender.   Lymph: + LLE edema (chronic) No lymphadenopathy.  Skin: No visible rashes or ulcers. left foot stump  Psych:  Mood & affect appropriate  LABS: All Labs Reviewed:        12 Lead ECG:   Ventricular Rate 90 BPM    QRS Duration 78 ms    Q-T Interval 360 ms    QTC Calculation(Bazett) 440 ms    R Axis 25 degrees    T Axis 63 degrees    Diagnosis Line Atrial fibrillation  Low voltage QRS  Abnormal ECG  Confirmed by jeovany France ([id number]) on 12/23/2023 4:37:12 PM (12-23-23 @ 13:08)      TRANSTHORACIC ECHOCARDIOGRAM REPORT  ________________________________________________________________________________                                      _______       Pt. Name:       SARAH MORRISON Study Date:    12/23/2023  MRN:            [id number]         YOB: 1970  Accession #:    [id number]        Age:           53 years  Account#:       [id number]       Gender:        M  Heart Rate:                      Height:        74.02 in (188.00 cm)  Rhythm:Weight:        255.73 lb (116.00 kg)  Blood Pressure: 102/58 mmHg      BSA/BMI:       2.41 m² / 32.82 kg/m²  ________________________________________________________________________________________  Referring Physician:    [id number] Hill Alamuri  Interpreting Physician: Jeovany France  Primary Sonographer:    Heidi Redd RUST    CPT:               ECHO TTE WO CON COMP W DOPP - 41793.m  Indication(s):     Abnormal electrocardiogram ECG/EKG - R94.31  Procedure:         Transthoracic echocardiogram with 2-D, M-mode and complete                     spectral and color flow Doppler.  Ordering Location: [facility]    _______________________________________________________________________________________     CONCLUSIONS:      1. Left ventricular systolic function is normal with an ejection fraction visually estimated at 55 to 60 %.   2. Moderate left ventricular hypertrophy.   3. The left ventricular diastolic function is indeterminate.   4. Normal right ventricular cavity size, wall thickness, and systolic function.   5. The left atrium is moderately dilated.   6. The right atrium is moderately dilated in size.   7. There is calcification of the mitral valve annulus.   8. Moderate mitral regurgitation.   9. Calcification of the aortic valve leaflets.  10. Mild tricuspid regurgitation.  11. Estimated pulmonary artery systolic pressure is 31 mmHg, consistent with normal pulmonary artery pressure.  12. Trace pericardial effusion.    ________________________________________________________________________________________  FINDINGS:     Left Ventricle:  Left ventricular systolic function is normal with an ejection fraction visually estimated at 55 to 60%. There are no regional wall motion abnormalities seen. The left ventricular diastolicfunction is indeterminate. Moderate left ventricular hypertrophy.     Right Ventricle:  The right ventricular cavity is normal in size, normal wall thickness and normal systolic function.     Left Atrium:  The left atrium is moderately dilated with an indexed volume of 47.25 ml/m².     Right Atrium:  The right atrium is moderately dilated in size with an indexed volume of 35.48 ml/m².     Aortic Valve:  The aortic valve appears trileaflet. There is calcification of the aortic valve leaflets. There is no aortic valve stenosis. There is no evidence of aortic regurgitation.     Mitral Valve:  There is mitral valve thickening of the anterior and posterior leaflets. There is calcification of the mitral valve annulus. There is moderate mitral regurgitation.     Tricuspid Valve:  Structurally normal tricuspid valve with normal leaflet excursion. There is mild tricuspid regurgitation. Estimated pulmonary artery systolic pressure is 31 mmHg, consistent with normal pulmonary artery pressure.     Pulmonic Valve:  Structurally normal pulmonic valve with normal leaflet excursion.     Aorta:  The aortic root at the sinuses of Valsalva is normal in size, measuring 3.60 cm (indexed 1.49 cm/m²). The aortic arch diameter is normal in size, measuring 2.3 cm (indexed 0.95 cm/m²).     Pericardium:  There is a trace pericardial effusion.     Systemic Veins:  The inferior vena cava is normal in size measuring 1.91 cm in diameter, (normal <2.1cm) with normal inspiratory collapse (normal >50%) consistent with normal right atrial pressure (~3, range 0-5mmHg).  ____________________________________________________________________  QUANTITATIVE DATA:  Left Ventricle Measurements: (Indexed to BSA)     IVSd (2D):   1.4 cm  LVPWd (2D):  1.4 cm  LVIDd (2D):  4.8 cm  LVIDs (2D):  3.3 cm  LV Mass:     283 g  117.1 g/m²  Visualized LV EF%: 55 to 60%     MV E Vmax:    1.49 m/s  e' lateral:   11.90 cm/s  e' medial:    8.38 cm/s  E/e' lateral: 12.52  E/e' medial:  17.78  E/e' Average: 14.69  MV DT:        102 msec    Aorta Measurements: (normal range) (Indexed to BSA)     Sinuses of Valsalva: 3.60 cm (3.1 - 3.7 cm)  Ao Arch:             2.3 cm       Left Atrium Measurements: (Indexed to BSA)  LA Diam 2D: 5.00 cm    Right Atrial Measurements:     RA Vol:  85.60 ml  RA Vol Index: 35.48 ml/m²    Mitral Valve Measurements:     MV E Vmax: 1.5 m/s         MR Vmax:          4.64 m/s                             MR Peak Gradient: 86.1 mmHg       Tricuspid Valve Measurements:     TR Vmax:          2.7 m/s  TR Peak Gradient: 28.3 mmHg  RA Pressure:      3 mmHg  PASP:             31 mmHg    ________________________________________________________________________________________  Electronically signed on 12/23/2023 at 3:54:22 PM by Jeovany France         *** Final ***      [facility] -- Brittany Lutz Pannella, Patel, Savella, Goodger, Cohen  Office # [phone]    Follow Up:  AFib     Subjective/Observations: seen and examined, awake, alert, resting comfortably in bed, denies chest pain, dyspnea, palpitations or dizziness, orthopnea and PND. on O2 via NC,  no events overnight       REVIEW OF SYSTEMS: All other review of systems is negative unless indicated above  PAST MEDICAL & SURGICAL HISTORY:  Diabetes      Diabetes mellitus with no complication      Afib      Hypertension      BPH (benign prostatic hyperplasia)      Perforated gastric ulcer  s/p emergent ex-lap omentopexy and plication 6/2019      Pulmonary embolism      History of non-ST elevation myocardial infarction (NSTEMI)      Osteomyelitis  s/p debridement      CAD S/P percutaneous coronary angioplasty      Cerebrovascular accident      H/O abdominal surgery      Perforated gastric ulcer      Traumatic amputation of left foot, initial encounter        MEDICATIONS  (STANDING):  apixaban 5 milliGRAM(s) Oral every 12 hours  aspirin  chewable 81 milliGRAM(s) Oral daily  atorvastatin 40 milliGRAM(s) Oral at bedtime  bethanechol 25 milliGRAM(s) Oral three times a day  cephalexin 500 milliGRAM(s) Oral four times a day  cholecalciferol 1000 Unit(s) Oral daily  dextrose 50% Injectable 25 Gram(s) IV Push once  dextrose 50% Injectable 25 Gram(s) IV Push once  dextrose 50% Injectable 12.5 Gram(s) IV Push once  dextrose Oral Gel 15 Gram(s) Oral once  diltiazem    Tablet 60 milliGRAM(s) Oral every 6 hours  famotidine    Tablet 20 milliGRAM(s) Oral daily  furosemide    Tablet 40 milliGRAM(s) Oral two times a day  gabapentin 300 milliGRAM(s) Oral two times a day  glucagon  Injectable 1 milliGRAM(s) IntraMuscular once  insulin glargine Injectable (LANTUS) 15 Unit(s) SubCutaneous at bedtime  insulin lispro (ADMELOG) corrective regimen sliding scale   SubCutaneous three times a day before meals  insulin lispro (ADMELOG) corrective regimen sliding scale   SubCutaneous at bedtime  insulin lispro Injectable (ADMELOG) 5 Unit(s) SubCutaneous three times a day before meals  isosorbide   mononitrate ER Tablet (IMDUR) 60 milliGRAM(s) Oral daily  losartan 100 milliGRAM(s) Oral daily  melatonin 5 milliGRAM(s) Oral at bedtime  methimazole 10 milliGRAM(s) Oral daily  metoclopramide 5 milliGRAM(s) Oral three times a day  metoprolol tartrate 50 milliGRAM(s) Oral every 8 hours  multivitamin/minerals 1 Tablet(s) Oral daily  oxybutynin 10 milliGRAM(s) Oral two times a day  oxyCODONE  ER Tablet 10 milliGRAM(s) Oral every 12 hours  pantoprazole    Tablet 40 milliGRAM(s) Oral before breakfast  phenazopyridine 100 milliGRAM(s) Oral every 8 hours  polyethylene glycol 3350 17 Gram(s) Oral two times a day  potassium chloride    Tablet ER 10 milliEquivalent(s) Oral daily  saccharomyces boulardii 250 milliGRAM(s) Oral two times a day  senna 2 Tablet(s) Oral at bedtime  sucralfate 1 Gram(s) Oral four times a day  tamsulosin 0.8 milliGRAM(s) Oral at bedtime    MEDICATIONS  (PRN):  acetaminophen     Tablet .. 650 milliGRAM(s) Oral every 6 hours PRN Temp greater or equal to 38C (100.4F), Mild Pain (1 - 3)  bisacodyl 5 milliGRAM(s) Oral every 12 hours PRN Constipation  magnesium hydroxide Suspension 30 milliLiter(s) Oral at bedtime PRN Constipation  sodium chloride 0.65% Nasal 1 Spray(s) Both Nostrils three times a day PRN Nasal Congestion    Allergies    No Known Drug Allergies  fish (Hives)    Intolerances      Vital Signs Last 24 Hrs  T(C): 36.6 (25 Dec 2023 04:57), Max: 36.7 (24 Dec 2023 11:19)  T(F): 97.9 (25 Dec 2023 04:57), Max: 98 (24 Dec 2023 11:19)  HR: 88 (25 Dec 2023 04:57) (56 - 88)  BP: 108/65 (25 Dec 2023 04:57) (106/71 - 116/76)  BP(mean): --  RR: 18 (25 Dec 2023 04:57) (18 - 18)  SpO2: 94% (25 Dec 2023 04:57) (91% - 94%)    Parameters below as of 25 Dec 2023 04:57  Patient On (Oxygen Delivery Method): nasal cannula      I&O's Summary        TELE: Not on telemetry   PHYSICAL EXAM:  Constitutional: NAD, awake and alert  HEENT: Moist Mucous Membranes, Anicteric  Pulmonary: Non-labored, breath sounds are clear bilaterally, No wheezing, rales or rhonchi  Cardiovascular:Regular, S1 and S2, No murmurs, rubs, gallops or clicks  Gastrointestinal: Bowel Sounds present, soft, nontender.   Lymph: + LLE edema (chronic) No lymphadenopathy.  Skin: No visible rashes or ulcers. left foot stump  Psych:  Mood & affect appropriate  LABS: All Labs Reviewed:        12 Lead ECG:   Ventricular Rate 90 BPM    QRS Duration 78 ms    Q-T Interval 360 ms    QTC Calculation(Bazett) 440 ms    R Axis 25 degrees    T Axis 63 degrees    Diagnosis Line Atrial fibrillation  Low voltage QRS  Abnormal ECG  Confirmed by jeovany France ([id number]) on 12/23/2023 4:37:12 PM (12-23-23 @ 13:08)      TRANSTHORACIC ECHOCARDIOGRAM REPORT  ________________________________________________________________________________                                      _______       Pt. Name:       SARAH MORRISON Study Date:    12/23/2023  MRN:            [id number]         YOB: 1970  Accession #:    [id number]        Age:           53 years  Account#:       [id number]       Gender:        M  Heart Rate:                      Height:        74.02 in (188.00 cm)  Rhythm:Weight:        255.73 lb (116.00 kg)  Blood Pressure: 102/58 mmHg      BSA/BMI:       2.41 m² / 32.82 kg/m²  ________________________________________________________________________________________  Referring Physician:    [id number] Hill Alamuri  Interpreting Physician: Jeovany France  Primary Sonographer:    Heidi Redd [facility]    CPT:               ECHO TTE WO CON COMP W DOPP - 79645.m  Indication(s):     Abnormal electrocardiogram ECG/EKG - R94.31  Procedure:         Transthoracic echocardiogram with 2-D, M-mode and complete                     spectral and color flow Doppler.  Ordering Location: [facility]    _______________________________________________________________________________________     CONCLUSIONS:      1. Left ventricular systolic function is normal with an ejection fraction visually estimated at 55 to 60 %.   2. Moderate left ventricular hypertrophy.   3. The left ventricular diastolic function is indeterminate.   4. Normal right ventricular cavity size, wall thickness, and systolic function.   5. The left atrium is moderately dilated.   6. The right atrium is moderately dilated in size.   7. There is calcification of the mitral valve annulus.   8. Moderate mitral regurgitation.   9. Calcification of the aortic valve leaflets.  10. Mild tricuspid regurgitation.  11. Estimated pulmonary artery systolic pressure is 31 mmHg, consistent with normal pulmonary artery pressure.  12. Trace pericardial effusion.    ________________________________________________________________________________________  FINDINGS:     Left Ventricle:  Left ventricular systolic function is normal with an ejection fraction visually estimated at 55 to 60%. There are no regional wall motion abnormalities seen. The left ventricular diastolicfunction is indeterminate. Moderate left ventricular hypertrophy.     Right Ventricle:  The right ventricular cavity is normal in size, normal wall thickness and normal systolic function.     Left Atrium:  The left atrium is moderately dilated with an indexed volume of 47.25 ml/m².     Right Atrium:  The right atrium is moderately dilated in size with an indexed volume of 35.48 ml/m².     Aortic Valve:  The aortic valve appears trileaflet. There is calcification of the aortic valve leaflets. There is no aortic valve stenosis. There is no evidence of aortic regurgitation.     Mitral Valve:  There is mitral valve thickening of the anterior and posterior leaflets. There is calcification of the mitral valve annulus. There is moderate mitral regurgitation.     Tricuspid Valve:  Structurally normal tricuspid valve with normal leaflet excursion. There is mild tricuspid regurgitation. Estimated pulmonary artery systolic pressure is 31 mmHg, consistent with normal pulmonary artery pressure.     Pulmonic Valve:  Structurally normal pulmonic valve with normal leaflet excursion.     Aorta:  The aortic root at the sinuses of Valsalva is normal in size, measuring 3.60 cm (indexed 1.49 cm/m²). The aortic arch diameter is normal in size, measuring 2.3 cm (indexed 0.95 cm/m²).     Pericardium:  There is a trace pericardial effusion.     Systemic Veins:  The inferior vena cava is normal in size measuring 1.91 cm in diameter, (normal <2.1cm) with normal inspiratory collapse (normal >50%) consistent with normal right atrial pressure (~3, range 0-5mmHg).  ____________________________________________________________________  QUANTITATIVE DATA:  Left Ventricle Measurements: (Indexed to BSA)     IVSd (2D):   1.4 cm  LVPWd (2D):  1.4 cm  LVIDd (2D):  4.8 cm  LVIDs (2D):  3.3 cm  LV Mass:     283 g  117.1 g/m²  Visualized LV EF%: 55 to 60%     MV E Vmax:    1.49 m/s  e' lateral:   11.90 cm/s  e' medial:    8.38 cm/s  E/e' lateral: 12.52  E/e' medial:  17.78  E/e' Average: 14.69  MV DT:        102 msec    Aorta Measurements: (normal range) (Indexed to BSA)     Sinuses of Valsalva: 3.60 cm (3.1 - 3.7 cm)  Ao Arch:             2.3 cm       Left Atrium Measurements: (Indexed to BSA)  LA Diam 2D: 5.00 cm    Right Atrial Measurements:     RA Vol:  85.60 ml  RA Vol Index: 35.48 ml/m²    Mitral Valve Measurements:     MV E Vmax: 1.5 m/s         MR Vmax:          4.64 m/s                             MR Peak Gradient: 86.1 mmHg       Tricuspid Valve Measurements:     TR Vmax:          2.7 m/s  TR Peak Gradient: 28.3 mmHg  RA Pressure:      3 mmHg  PASP:             31 mmHg    ________________________________________________________________________________________  Electronically signed on 12/23/2023 at 3:54:22 PM by Jeovany France         *** Final ***

## 2023-12-25 NOTE — PROGRESS NOTE ADULT - ASSESSMENT
54yo M with a PMH of CVA, chronic PE (on Eliquis), atrial fibrillation, hypertension, BPH, type 2 diabetes, chronic Aguilera, hyperlipidemia, GERD, CAD (s/p stent to RCA), peripheral vascular disease, hypothyroid who presents to the ED for SOB, chills and lethargy, found to be in rapid Afib w/ RVR. Patient states he used to follow with Cardio Dr. Fracne, but has not followed up with Cardio in many years.     A fib RVR, chr PE, HTN, HLD, CAD, stents, PVD  - Hx of PAF but no clear documentation on whether the patient has had recent episodes of Afib  - Previously on Amiodarone, but d/c due to patient having negative side effects affecting his thyroid   - EKbpm, Afib w/ RVR  - S/p IV Cardizem 10mg x1    - BP, HR stable and controlled per flow sheet   - continue Cardizem 60 mg PO Q6H, can change to CD   - continue Eliquis for chronic PE and now Afib    - Continue Metoprolol tartrate, increase to 50mg Q8H and titrate up if needed   - continue home Losartan and Imdur    - Hx of CAD s/p 1 stent to RCA   - EKbpm, Afib w/ RVR  - Troponin: <-98.2 likely elevated in the setting of demand ischemia  - continue ASA and statin    - no anginal complaints     - Pro bnp [id number]  - TTE (): EF 65%, mild MR, concentric LVH, grade II diastolic dysfunction    - TTE (2023) LVSF normal with EF 55 to 60 %, mod LVH, mod MR, Trace pericardial effusion.  - Continue home Lasix 40mg PO daily   - Continue Fluid restriction and Strict I/Os, daily weights  - Monitor and replete Lytes. Keep K > 4 and Mg > 2    - DC planning per primary   - Will continue to follow.    Jennifer Gudino [facility]  Nurse Practitioner - Cardiology   call TEAMS     54yo M with a PMH of CVA, chronic PE (on Eliquis), atrial fibrillation, hypertension, BPH, type 2 diabetes, chronic Aguilera, hyperlipidemia, GERD, CAD (s/p stent to RCA), peripheral vascular disease, hypothyroid who presents to the ED for SOB, chills and lethargy, found to be in rapid Afib w/ RVR. Patient states he used to follow with Cardio Dr. France, but has not followed up with Cardio in many years.     A fib RVR, chr PE, HTN, HLD, CAD, stents, PVD  - Hx of PAF but no clear documentation on whether the patient has had recent episodes of Afib  - Previously on Amiodarone, but d/c due to patient having negative side effects affecting his thyroid   - EKbpm, Afib w/ RVR  - S/p IV Cardizem 10mg x1    - BP, HR stable and controlled per flow sheet   - continue Cardizem 60 mg PO Q6H, can change to CD   - continue Eliquis for chronic PE and now Afib    - Continue Metoprolol tartrate, increase to 50mg Q8H and titrate up if needed   - continue home Losartan and Imdur    - Hx of CAD s/p 1 stent to RCA   - EKbpm, Afib w/ RVR  - Troponin: <-98.2 likely elevated in the setting of demand ischemia  - continue ASA and statin    - no anginal complaints     - Pro bnp [id number]  - TTE (): EF 65%, mild MR, concentric LVH, grade II diastolic dysfunction    - TTE (2023) LVSF normal with EF 55 to 60 %, mod LVH, mod MR, Trace pericardial effusion.  - Continue home Lasix 40mg PO daily   - Continue Fluid restriction and Strict I/Os, daily weights  - Monitor and replete Lytes. Keep K > 4 and Mg > 2    - DC planning per primary   - Will continue to follow.    Jennifer Gudino [facility] [place]  Nurse Practitioner - Cardiology   call TEAMS

## 2023-12-25 NOTE — PROGRESS NOTE ADULT - ASSESSMENT
53-year-old male with a history of a CVA, chronic PE, atrial fibrillation, hypertension, BPH, type 2 diabetes, chronic Aguilera, hyperlipidemia, GERD, CAD, peripheral vascular disease, hypothyroid presents with brought in by EMS from SNF for generalized weakness, urinary symptoms, feeling of obstruction, tachycardia, possible infection    sepsis  uti  hydro  AF  OP  OA  Atelectasis  hx of PE  CVA hx  HTN  DM  chr Aguilera Cath  HLD  GERD  CAD    cxr noted - infiltrate - atelectasis - eff  TTE - mitral valve disease  on LASIX  on KEFLEX    s/p OBI  on lasix   HF - eval and management  cardio f/u  I and O  serial renal indices  monitor VS and Sat  proBNP noted - c/w Vol Overload  on AC - Eliquis - hx of PE - AFIB  AF - rate and rhythm control  BP control  on EMP ABX - cx - biomarkers - CT imaging reviewed  ID input reviewed  long term resident of SNF  GOC documented  FULL CODE  fio2 titration  goal sat > 88 pct

## 2023-12-26 ENCOUNTER — TRANSCRIPTION ENCOUNTER (OUTPATIENT)
Age: 53
End: 2023-12-26

## 2023-12-26 VITALS
SYSTOLIC BLOOD PRESSURE: 101 MMHG | RESPIRATION RATE: 18 BRPM | OXYGEN SATURATION: 93 % | TEMPERATURE: 98 F | HEART RATE: 78 BPM | DIASTOLIC BLOOD PRESSURE: 65 MMHG

## 2023-12-26 LAB
CULTURE RESULTS: SIGNIFICANT CHANGE UP
GLUCOSE BLDC GLUCOMTR-MCNC: 173 MG/DL — HIGH (ref 70–99)
GLUCOSE BLDC GLUCOMTR-MCNC: 173 MG/DL — HIGH (ref 70–99)
GLUCOSE BLDC GLUCOMTR-MCNC: 222 MG/DL — HIGH (ref 70–99)
GLUCOSE BLDC GLUCOMTR-MCNC: 222 MG/DL — HIGH (ref 70–99)
GLUCOSE BLDC GLUCOMTR-MCNC: 230 MG/DL — HIGH (ref 70–99)
GLUCOSE BLDC GLUCOMTR-MCNC: 230 MG/DL — HIGH (ref 70–99)
SPECIMEN SOURCE: SIGNIFICANT CHANGE UP

## 2023-12-26 PROCEDURE — 83605 ASSAY OF LACTIC ACID: CPT

## 2023-12-26 PROCEDURE — 87186 SC STD MICRODIL/AGAR DIL: CPT

## 2023-12-26 PROCEDURE — 71045 X-RAY EXAM CHEST 1 VIEW: CPT

## 2023-12-26 PROCEDURE — 80048 BASIC METABOLIC PNL TOTAL CA: CPT

## 2023-12-26 PROCEDURE — 36415 COLL VENOUS BLD VENIPUNCTURE: CPT

## 2023-12-26 PROCEDURE — 82550 ASSAY OF CK (CPK): CPT

## 2023-12-26 PROCEDURE — 85730 THROMBOPLASTIN TIME PARTIAL: CPT

## 2023-12-26 PROCEDURE — 99232 SBSQ HOSP IP/OBS MODERATE 35: CPT

## 2023-12-26 PROCEDURE — 87150 DNA/RNA AMPLIFIED PROBE: CPT

## 2023-12-26 PROCEDURE — 85610 PROTHROMBIN TIME: CPT

## 2023-12-26 PROCEDURE — 87086 URINE CULTURE/COLONY COUNT: CPT

## 2023-12-26 PROCEDURE — 87077 CULTURE AEROBIC IDENTIFY: CPT

## 2023-12-26 PROCEDURE — 93306 TTE W/DOPPLER COMPLETE: CPT

## 2023-12-26 PROCEDURE — 99291 CRITICAL CARE FIRST HOUR: CPT

## 2023-12-26 PROCEDURE — 83036 HEMOGLOBIN GLYCOSYLATED A1C: CPT

## 2023-12-26 PROCEDURE — 84443 ASSAY THYROID STIM HORMONE: CPT

## 2023-12-26 PROCEDURE — 96374 THER/PROPH/DIAG INJ IV PUSH: CPT

## 2023-12-26 PROCEDURE — 74176 CT ABD & PELVIS W/O CONTRAST: CPT | Mod: MA

## 2023-12-26 PROCEDURE — 85027 COMPLETE CBC AUTOMATED: CPT

## 2023-12-26 PROCEDURE — 82803 BLOOD GASES ANY COMBINATION: CPT

## 2023-12-26 PROCEDURE — 81001 URINALYSIS AUTO W/SCOPE: CPT

## 2023-12-26 PROCEDURE — 82962 GLUCOSE BLOOD TEST: CPT

## 2023-12-26 PROCEDURE — 83880 ASSAY OF NATRIURETIC PEPTIDE: CPT

## 2023-12-26 PROCEDURE — 80053 COMPREHEN METABOLIC PANEL: CPT

## 2023-12-26 PROCEDURE — 96375 TX/PRO/DX INJ NEW DRUG ADDON: CPT

## 2023-12-26 PROCEDURE — 93005 ELECTROCARDIOGRAM TRACING: CPT

## 2023-12-26 PROCEDURE — 84439 ASSAY OF FREE THYROXINE: CPT

## 2023-12-26 PROCEDURE — 85025 COMPLETE CBC W/AUTO DIFF WBC: CPT

## 2023-12-26 PROCEDURE — 84481 FREE ASSAY (FT-3): CPT

## 2023-12-26 PROCEDURE — 97165 OT EVAL LOW COMPLEX 30 MIN: CPT

## 2023-12-26 PROCEDURE — 87040 BLOOD CULTURE FOR BACTERIA: CPT

## 2023-12-26 PROCEDURE — 97162 PT EVAL MOD COMPLEX 30 MIN: CPT

## 2023-12-26 PROCEDURE — 84484 ASSAY OF TROPONIN QUANT: CPT

## 2023-12-26 PROCEDURE — 0225U NFCT DS DNA&RNA 21 SARSCOV2: CPT

## 2023-12-26 RX ORDER — LOSARTAN POTASSIUM 100 MG/1
1 TABLET, FILM COATED ORAL
Refills: 0 | DISCHARGE

## 2023-12-26 RX ORDER — ISOSORBIDE MONONITRATE 60 MG/1
1 TABLET, EXTENDED RELEASE ORAL
Refills: 0 | DISCHARGE

## 2023-12-26 RX ORDER — ISOSORBIDE MONONITRATE 60 MG/1
1 TABLET, EXTENDED RELEASE ORAL
Qty: 0 | Refills: 0 | DISCHARGE
Start: 2023-12-26

## 2023-12-26 RX ORDER — LOSARTAN POTASSIUM 100 MG/1
25 TABLET, FILM COATED ORAL DAILY
Refills: 0 | Status: DISCONTINUED | OUTPATIENT
Start: 2023-12-26 | End: 2023-12-26

## 2023-12-26 RX ORDER — LOSARTAN POTASSIUM 100 MG/1
1 TABLET, FILM COATED ORAL
Qty: 0 | Refills: 0 | DISCHARGE
Start: 2023-12-26

## 2023-12-26 RX ORDER — FUROSEMIDE 40 MG
1 TABLET ORAL
Refills: 0 | DISCHARGE
Start: 2023-12-26

## 2023-12-26 RX ORDER — FUROSEMIDE 40 MG
1 TABLET ORAL
Refills: 0 | DISCHARGE

## 2023-12-26 RX ADMIN — Medication 10 MILLIGRAM(S): at 05:33

## 2023-12-26 RX ADMIN — Medication 100 MILLIGRAM(S): at 05:32

## 2023-12-26 RX ADMIN — Medication 4: at 09:25

## 2023-12-26 RX ADMIN — PANTOPRAZOLE SODIUM 40 MILLIGRAM(S): 20 TABLET, DELAYED RELEASE ORAL at 05:33

## 2023-12-26 RX ADMIN — Medication 1 GRAM(S): at 05:32

## 2023-12-26 RX ADMIN — Medication 5 UNIT(S): at 12:24

## 2023-12-26 RX ADMIN — Medication 10 MILLIEQUIVALENT(S): at 12:24

## 2023-12-26 RX ADMIN — POLYETHYLENE GLYCOL 3350 17 GRAM(S): 17 POWDER, FOR SOLUTION ORAL at 05:31

## 2023-12-26 RX ADMIN — Medication 81 MILLIGRAM(S): at 12:25

## 2023-12-26 RX ADMIN — APIXABAN 5 MILLIGRAM(S): 2.5 TABLET, FILM COATED ORAL at 05:32

## 2023-12-26 RX ADMIN — Medication 5 MILLIGRAM(S): at 05:32

## 2023-12-26 RX ADMIN — Medication 500 MILLIGRAM(S): at 05:32

## 2023-12-26 RX ADMIN — Medication 60 MILLIGRAM(S): at 05:32

## 2023-12-26 RX ADMIN — Medication 4: at 12:23

## 2023-12-26 RX ADMIN — Medication 1000 UNIT(S): at 12:25

## 2023-12-26 RX ADMIN — Medication 50 MILLIGRAM(S): at 05:32

## 2023-12-26 RX ADMIN — Medication 1 TABLET(S): at 12:24

## 2023-12-26 RX ADMIN — GABAPENTIN 300 MILLIGRAM(S): 400 CAPSULE ORAL at 05:32

## 2023-12-26 RX ADMIN — Medication 250 MILLIGRAM(S): at 05:45

## 2023-12-26 RX ADMIN — Medication 5 UNIT(S): at 09:25

## 2023-12-26 RX ADMIN — OXYCODONE HYDROCHLORIDE 10 MILLIGRAM(S): 5 TABLET ORAL at 05:33

## 2023-12-26 RX ADMIN — Medication 500 MILLIGRAM(S): at 12:25

## 2023-12-26 RX ADMIN — OXYCODONE HYDROCHLORIDE 10 MILLIGRAM(S): 5 TABLET ORAL at 06:27

## 2023-12-26 RX ADMIN — FAMOTIDINE 20 MILLIGRAM(S): 10 INJECTION INTRAVENOUS at 12:25

## 2023-12-26 RX ADMIN — Medication 1 GRAM(S): at 12:24

## 2023-12-26 RX ADMIN — Medication 60 MILLIGRAM(S): at 12:24

## 2023-12-26 RX ADMIN — Medication 25 MILLIGRAM(S): at 05:33

## 2023-12-26 NOTE — PROGRESS NOTE ADULT - SUBJECTIVE AND OBJECTIVE BOX
[facility] -- Brittany Lutz, Reynaldo Sweeney Savella, , Gera Maya  Office # [phone]    Follow Up:  Afib    Subjective/Observations: Awake and alert.  NC at 2L/min in use but patient denies SOB or DUBOIS.  Denies CP or palpitations      REVIEW OF SYSTEMS: All other review of systems is negative unless indicated above  PAST MEDICAL & SURGICAL HISTORY:  Diabetes      Diabetes mellitus with no complication      Afib      Hypertension      BPH (benign prostatic hyperplasia)      Perforated gastric ulcer  s/p emergent ex-lap omentopexy and plication 6/2019      Pulmonary embolism      History of non-ST elevation myocardial infarction (NSTEMI)      Osteomyelitis  s/p debridement      CAD S/P percutaneous coronary angioplasty      Cerebrovascular accident      H/O abdominal surgery      Perforated gastric ulcer      Traumatic amputation of left foot, initial encounter    MEDICATIONS  (STANDING):  apixaban 5 milliGRAM(s) Oral every 12 hours  aspirin  chewable 81 milliGRAM(s) Oral daily  atorvastatin 40 milliGRAM(s) Oral at bedtime  bethanechol 25 milliGRAM(s) Oral three times a day  cephalexin 500 milliGRAM(s) Oral four times a day  cholecalciferol 1000 Unit(s) Oral daily  dextrose 50% Injectable 25 Gram(s) IV Push once  dextrose 50% Injectable 25 Gram(s) IV Push once  dextrose 50% Injectable 12.5 Gram(s) IV Push once  dextrose Oral Gel 15 Gram(s) Oral once  diltiazem    Tablet 60 milliGRAM(s) Oral every 6 hours  famotidine    Tablet 20 milliGRAM(s) Oral daily  furosemide    Tablet 40 milliGRAM(s) Oral two times a day  gabapentin 300 milliGRAM(s) Oral two times a day  glucagon  Injectable 1 milliGRAM(s) IntraMuscular once  insulin glargine Injectable (LANTUS) 15 Unit(s) SubCutaneous at bedtime  insulin lispro (ADMELOG) corrective regimen sliding scale   SubCutaneous three times a day before meals  insulin lispro (ADMELOG) corrective regimen sliding scale   SubCutaneous at bedtime  insulin lispro Injectable (ADMELOG) 5 Unit(s) SubCutaneous three times a day before meals  isosorbide   mononitrate ER Tablet (IMDUR) 60 milliGRAM(s) Oral daily  losartan 25 milliGRAM(s) Oral daily  melatonin 5 milliGRAM(s) Oral at bedtime  methimazole 10 milliGRAM(s) Oral daily  metoclopramide 5 milliGRAM(s) Oral three times a day  metoprolol tartrate 50 milliGRAM(s) Oral every 8 hours  multivitamin/minerals 1 Tablet(s) Oral daily  oxybutynin 10 milliGRAM(s) Oral two times a day  oxyCODONE  ER Tablet 10 milliGRAM(s) Oral every 12 hours  pantoprazole    Tablet 40 milliGRAM(s) Oral before breakfast  phenazopyridine 100 milliGRAM(s) Oral every 8 hours  polyethylene glycol 3350 17 Gram(s) Oral two times a day  potassium chloride    Tablet ER 10 milliEquivalent(s) Oral daily  saccharomyces boulardii 250 milliGRAM(s) Oral two times a day  senna 2 Tablet(s) Oral at bedtime  sucralfate 1 Gram(s) Oral four times a day  tamsulosin 0.8 milliGRAM(s) Oral at bedtime    MEDICATIONS  (PRN):  acetaminophen     Tablet .. 650 milliGRAM(s) Oral every 6 hours PRN Temp greater or equal to 38C (100.4F), Mild Pain (1 - 3)  bisacodyl 5 milliGRAM(s) Oral every 12 hours PRN Constipation  magnesium hydroxide Suspension 30 milliLiter(s) Oral at bedtime PRN Constipation  sodium chloride 0.65% Nasal 1 Spray(s) Both Nostrils three times a day PRN Nasal Congestion    Allergies    No Known Drug Allergies  fish (Hives)    Intolerances      Vital Signs Last 24 Hrs  T(C): 36.7 (26 Dec 2023 11:42), Max: 36.7 (26 Dec 2023 11:42)  T(F): 98.1 (26 Dec 2023 11:42), Max: 98.1 (26 Dec 2023 11:42)  HR: 78 (26 Dec 2023 11:42) (69 - 78)  BP: 101/65 (26 Dec 2023 11:42) (94/71 - 111/69)  BP(mean): --  RR: 18 (26 Dec 2023 11:42) (18 - 18)  SpO2: 93% (26 Dec 2023 11:42) (92% - 96%)    Parameters below as of 26 Dec 2023 11:42  Patient On (Oxygen Delivery Method): room air      I&O's Summary      PHYSICAL EXAM:  TELE: NOt on tele  Constitutional: NAD, awake and alert, obese  HEENT: Moist Mucous Membranes, Anicteric  Pulmonary: Non-labored, breath sounds are clear bilaterally, No wheezing, rales or rhonchi  Cardiovascular: IRRR, S1 and S2, +murmurs, no rubs, gallops or clicks  Gastrointestinal: Bowel Sounds present, soft, nontender.   Lymph: Trace edema. No lymphadenopathy.  Skin: No visible rashes or ulcers.  Psych:  Mood & affect appropriate  LABS: All Labs Reviewed:    12 Lead ECG:   Ventricular Rate 90 BPM    QRS Duration 78 ms    Q-T Interval 360 ms    QTC Calculation(Bazett) 440 ms    R Axis 25 degrees    T Axis 63 degrees    Diagnosis Line Atrial fibrillation  Low voltage QRS  Abnormal ECG  Confirmed by jeovany France ([id number]) on 12/23/2023 4:37:12 PM (12-23-23 @ 13:08)      TRANSTHORACIC ECHOCARDIOGRAM REPORT  ________________________________________________________________________________                                      _______     Pt. Name:       SARAH MORRISON Study Date:    12/23/2023  MRN:            [id number]         YOB: 1970  Accession #:    [id number]        Age:           53 years  Account#:       [id number]       Gender:        M  Heart Rate:                      Height:        74.02 in (188.00 cm)  Rhythm:Weight:        255.73 lb (116.00 kg)  Blood Pressure: 102/58 mmHg      BSA/BMI:       2.41 m² / 32.82 kg/m²  ________________________________________________________________________________________  Referring Physician:    [id number] Hill Brizuela  Interpreting Physician: Jeovany France  Primary Sonographer:    Heidi Redd [facility]    CPT:               ECHO TTE WO CON COMP W DOPP - 75360.m  Indication(s):     Abnormal electrocardiogram ECG/EKG - R94.31  Procedure:         Transthoracic echocardiogram with 2-D, M-mode and complete                     spectral and color flow Doppler.  Ordering Location: [facility]    _______________________________________________________________________________________     CONCLUSIONS:      1. Left ventricular systolic function is normal with an ejection fraction visually estimated at 55 to 60 %.   2. Moderate left ventricular hypertrophy.   3. The left ventricular diastolic function is indeterminate.   4. Normal right ventricular cavity size, wall thickness, and systolic function.   5. The left atrium is moderately dilated.   6. The right atrium is moderately dilated in size.   7. There is calcification of the mitral valve annulus.   8. Moderate mitral regurgitation.   9. Calcification of the aortic valve leaflets.  10. Mild tricuspid regurgitation.  11. Estimated pulmonary artery systolic pressure is 31 mmHg, consistent with normal pulmonary artery pressure.  12. Trace pericardial effusion.    ________________________________________________________________________________________  FINDINGS:     Left Ventricle:  Left ventricular systolic function is normal with an ejection fraction visually estimated at 55 to 60%. There are no regional wall motion abnormalities seen. The left ventricular diastolicfunction is indeterminate. Moderate left ventricular hypertrophy.     Right Ventricle:  The right ventricular cavity is normal in size, normal wall thickness and normal systolic function.     Left Atrium:  The left atrium is moderately dilated with an indexed volume of 47.25 ml/m².     Right Atrium:  The right atrium is moderately dilated in size with an indexed volume of 35.48 ml/m².     Aortic Valve:  The aortic valve appears trileaflet. There is calcification of the aortic valve leaflets. There is no aortic valve stenosis. There is no evidence of aortic regurgitation.     Mitral Valve:  There is mitral valve thickening of the anterior and posterior leaflets. There is calcification of the mitral valve annulus. There is moderate mitral regurgitation.     Tricuspid Valve:  Structurally normal tricuspid valve with normal leaflet excursion. There is mild tricuspid regurgitation. Estimated pulmonary artery systolic pressure is 31 mmHg, consistent with normal pulmonary artery pressure.     Pulmonic Valve:  Structurally normal pulmonic valve with normal leaflet excursion.     Aorta:  The aortic root at the sinuses of Valsalva is normal in size, measuring 3.60 cm (indexed 1.49 cm/m²). The aortic arch diameter is normal in size, measuring 2.3 cm (indexed 0.95 cm/m²).     Pericardium:  There is a trace pericardial effusion.     Systemic Veins:  The inferior vena cava is normal in size measuring 1.91 cm in diameter, (normal <2.1cm) with normal inspiratory collapse (normal >50%) consistent with normal right atrial pressure (~3, range 0-5mmHg).  ____________________________________________________________________  QUANTITATIVE DATA:  Left Ventricle Measurements: (Indexed to BSA)     IVSd (2D):   1.4 cm  LVPWd (2D):  1.4 cm  LVIDd (2D):  4.8 cm  LVIDs (2D):  3.3 cm  LV Mass:     283 g  117.1 g/m²  Visualized LV EF%: 55 to 60%     MV E Vmax:    1.49 m/s  e' lateral:   11.90 cm/s  e' medial:    8.38 cm/s  E/e' lateral: 12.52  E/e' medial:  17.78  E/e' Average: 14.69  MV DT:        102 msec    Aorta Measurements: (normal range) (Indexed to BSA)     Sinuses of Valsalva: 3.60 cm (3.1 - 3.7 cm)  Ao Arch:             2.3 cm       Left Atrium Measurements: (Indexed to BSA)  LA Diam 2D: 5.00 cm    Right Atrial Measurements:     RA Vol:  85.60 ml  RA Vol Index: 35.48 ml/m²    Mitral Valve Measurements:     MV E Vmax: 1.5 m/s         MR Vmax:          4.64 m/s                             MR Peak Gradient: 86.1 mmHg    Tricuspid Valve Measurements:     TR Vmax:          2.7 m/s  TR Peak Gradient: 28.3 mmHg  RA Pressure:      3 mmHg  PASP:             31 mmHg    ________________________________________________________________________________________  Electronically signed on 12/23/2023 at 3:54:22 PM by Jeovany France    *** Final ***      [facility] -- Brittany Lutz, Reynaldo Sweeney Savella, , Gera Maya  Office # [phone]    Follow Up:  Afib    Subjective/Observations: Awake and alert.  NC at 2L/min in use but patient denies SOB or DUBOIS.  Denies CP or palpitations      REVIEW OF SYSTEMS: All other review of systems is negative unless indicated above  PAST MEDICAL & SURGICAL HISTORY:  Diabetes      Diabetes mellitus with no complication      Afib      Hypertension      BPH (benign prostatic hyperplasia)      Perforated gastric ulcer  s/p emergent ex-lap omentopexy and plication 6/2019      Pulmonary embolism      History of non-ST elevation myocardial infarction (NSTEMI)      Osteomyelitis  s/p debridement      CAD S/P percutaneous coronary angioplasty      Cerebrovascular accident      H/O abdominal surgery      Perforated gastric ulcer      Traumatic amputation of left foot, initial encounter    MEDICATIONS  (STANDING):  apixaban 5 milliGRAM(s) Oral every 12 hours  aspirin  chewable 81 milliGRAM(s) Oral daily  atorvastatin 40 milliGRAM(s) Oral at bedtime  bethanechol 25 milliGRAM(s) Oral three times a day  cephalexin 500 milliGRAM(s) Oral four times a day  cholecalciferol 1000 Unit(s) Oral daily  dextrose 50% Injectable 25 Gram(s) IV Push once  dextrose 50% Injectable 25 Gram(s) IV Push once  dextrose 50% Injectable 12.5 Gram(s) IV Push once  dextrose Oral Gel 15 Gram(s) Oral once  diltiazem    Tablet 60 milliGRAM(s) Oral every 6 hours  famotidine    Tablet 20 milliGRAM(s) Oral daily  furosemide    Tablet 40 milliGRAM(s) Oral two times a day  gabapentin 300 milliGRAM(s) Oral two times a day  glucagon  Injectable 1 milliGRAM(s) IntraMuscular once  insulin glargine Injectable (LANTUS) 15 Unit(s) SubCutaneous at bedtime  insulin lispro (ADMELOG) corrective regimen sliding scale   SubCutaneous three times a day before meals  insulin lispro (ADMELOG) corrective regimen sliding scale   SubCutaneous at bedtime  insulin lispro Injectable (ADMELOG) 5 Unit(s) SubCutaneous three times a day before meals  isosorbide   mononitrate ER Tablet (IMDUR) 60 milliGRAM(s) Oral daily  losartan 25 milliGRAM(s) Oral daily  melatonin 5 milliGRAM(s) Oral at bedtime  methimazole 10 milliGRAM(s) Oral daily  metoclopramide 5 milliGRAM(s) Oral three times a day  metoprolol tartrate 50 milliGRAM(s) Oral every 8 hours  multivitamin/minerals 1 Tablet(s) Oral daily  oxybutynin 10 milliGRAM(s) Oral two times a day  oxyCODONE  ER Tablet 10 milliGRAM(s) Oral every 12 hours  pantoprazole    Tablet 40 milliGRAM(s) Oral before breakfast  phenazopyridine 100 milliGRAM(s) Oral every 8 hours  polyethylene glycol 3350 17 Gram(s) Oral two times a day  potassium chloride    Tablet ER 10 milliEquivalent(s) Oral daily  saccharomyces boulardii 250 milliGRAM(s) Oral two times a day  senna 2 Tablet(s) Oral at bedtime  sucralfate 1 Gram(s) Oral four times a day  tamsulosin 0.8 milliGRAM(s) Oral at bedtime    MEDICATIONS  (PRN):  acetaminophen     Tablet .. 650 milliGRAM(s) Oral every 6 hours PRN Temp greater or equal to 38C (100.4F), Mild Pain (1 - 3)  bisacodyl 5 milliGRAM(s) Oral every 12 hours PRN Constipation  magnesium hydroxide Suspension 30 milliLiter(s) Oral at bedtime PRN Constipation  sodium chloride 0.65% Nasal 1 Spray(s) Both Nostrils three times a day PRN Nasal Congestion    Allergies    No Known Drug Allergies  fish (Hives)    Intolerances      Vital Signs Last 24 Hrs  T(C): 36.7 (26 Dec 2023 11:42), Max: 36.7 (26 Dec 2023 11:42)  T(F): 98.1 (26 Dec 2023 11:42), Max: 98.1 (26 Dec 2023 11:42)  HR: 78 (26 Dec 2023 11:42) (69 - 78)  BP: 101/65 (26 Dec 2023 11:42) (94/71 - 111/69)  BP(mean): --  RR: 18 (26 Dec 2023 11:42) (18 - 18)  SpO2: 93% (26 Dec 2023 11:42) (92% - 96%)    Parameters below as of 26 Dec 2023 11:42  Patient On (Oxygen Delivery Method): room air      I&O's Summary      PHYSICAL EXAM:  TELE: NOt on tele  Constitutional: NAD, awake and alert, obese  HEENT: Moist Mucous Membranes, Anicteric  Pulmonary: Non-labored, breath sounds are clear bilaterally, No wheezing, rales or rhonchi  Cardiovascular: IRRR, S1 and S2, +murmurs, no rubs, gallops or clicks  Gastrointestinal: Bowel Sounds present, soft, nontender.   Lymph: Trace edema. No lymphadenopathy.  Skin: No visible rashes or ulcers.  Psych:  Mood & affect appropriate  LABS: All Labs Reviewed:    12 Lead ECG:   Ventricular Rate 90 BPM    QRS Duration 78 ms    Q-T Interval 360 ms    QTC Calculation(Bazett) 440 ms    R Axis 25 degrees    T Axis 63 degrees    Diagnosis Line Atrial fibrillation  Low voltage QRS  Abnormal ECG  Confirmed by jeovany France ([id number]) on 12/23/2023 4:37:12 PM (12-23-23 @ 13:08)      TRANSTHORACIC ECHOCARDIOGRAM REPORT  ________________________________________________________________________________                                      _______     Pt. Name:       SARAH MORRISON Study Date:    12/23/2023  MRN:            [id number]         YOB: 1970  Accession #:    [id number]        Age:           53 years  Account#:       [id number]       Gender:        M  Heart Rate:                      Height:        74.02 in (188.00 cm)  Rhythm:Weight:        255.73 lb (116.00 kg)  Blood Pressure: 102/58 mmHg      BSA/BMI:       2.41 m² / 32.82 kg/m²  ________________________________________________________________________________________  Referring Physician:    [id number] Hill Brizuela  Interpreting Physician: Jeovany France  Primary Sonographer:    Heidi Redd [facility]    CPT:               ECHO TTE WO CON COMP W DOPP - 31727.m  Indication(s):     Abnormal electrocardiogram ECG/EKG - R94.31  Procedure:         Transthoracic echocardiogram with 2-D, M-mode and complete                     spectral and color flow Doppler.  Ordering Location: [facility]    _______________________________________________________________________________________     CONCLUSIONS:      1. Left ventricular systolic function is normal with an ejection fraction visually estimated at 55 to 60 %.   2. Moderate left ventricular hypertrophy.   3. The left ventricular diastolic function is indeterminate.   4. Normal right ventricular cavity size, wall thickness, and systolic function.   5. The left atrium is moderately dilated.   6. The right atrium is moderately dilated in size.   7. There is calcification of the mitral valve annulus.   8. Moderate mitral regurgitation.   9. Calcification of the aortic valve leaflets.  10. Mild tricuspid regurgitation.  11. Estimated pulmonary artery systolic pressure is 31 mmHg, consistent with normal pulmonary artery pressure.  12. Trace pericardial effusion.    ________________________________________________________________________________________  FINDINGS:     Left Ventricle:  Left ventricular systolic function is normal with an ejection fraction visually estimated at 55 to 60%. There are no regional wall motion abnormalities seen. The left ventricular diastolicfunction is indeterminate. Moderate left ventricular hypertrophy.     Right Ventricle:  The right ventricular cavity is normal in size, normal wall thickness and normal systolic function.     Left Atrium:  The left atrium is moderately dilated with an indexed volume of 47.25 ml/m².     Right Atrium:  The right atrium is moderately dilated in size with an indexed volume of 35.48 ml/m².     Aortic Valve:  The aortic valve appears trileaflet. There is calcification of the aortic valve leaflets. There is no aortic valve stenosis. There is no evidence of aortic regurgitation.     Mitral Valve:  There is mitral valve thickening of the anterior and posterior leaflets. There is calcification of the mitral valve annulus. There is moderate mitral regurgitation.     Tricuspid Valve:  Structurally normal tricuspid valve with normal leaflet excursion. There is mild tricuspid regurgitation. Estimated pulmonary artery systolic pressure is 31 mmHg, consistent with normal pulmonary artery pressure.     Pulmonic Valve:  Structurally normal pulmonic valve with normal leaflet excursion.     Aorta:  The aortic root at the sinuses of Valsalva is normal in size, measuring 3.60 cm (indexed 1.49 cm/m²). The aortic arch diameter is normal in size, measuring 2.3 cm (indexed 0.95 cm/m²).     Pericardium:  There is a trace pericardial effusion.     Systemic Veins:  The inferior vena cava is normal in size measuring 1.91 cm in diameter, (normal <2.1cm) with normal inspiratory collapse (normal >50%) consistent with normal right atrial pressure (~3, range 0-5mmHg).  ____________________________________________________________________  QUANTITATIVE DATA:  Left Ventricle Measurements: (Indexed to BSA)     IVSd (2D):   1.4 cm  LVPWd (2D):  1.4 cm  LVIDd (2D):  4.8 cm  LVIDs (2D):  3.3 cm  LV Mass:     283 g  117.1 g/m²  Visualized LV EF%: 55 to 60%     MV E Vmax:    1.49 m/s  e' lateral:   11.90 cm/s  e' medial:    8.38 cm/s  E/e' lateral: 12.52  E/e' medial:  17.78  E/e' Average: 14.69  MV DT:        102 msec    Aorta Measurements: (normal range) (Indexed to BSA)     Sinuses of Valsalva: 3.60 cm (3.1 - 3.7 cm)  Ao Arch:             2.3 cm       Left Atrium Measurements: (Indexed to BSA)  LA Diam 2D: 5.00 cm    Right Atrial Measurements:     RA Vol:  85.60 ml  RA Vol Index: 35.48 ml/m²    Mitral Valve Measurements:     MV E Vmax: 1.5 m/s         MR Vmax:          4.64 m/s                             MR Peak Gradient: 86.1 mmHg    Tricuspid Valve Measurements:     TR Vmax:          2.7 m/s  TR Peak Gradient: 28.3 mmHg  RA Pressure:      3 mmHg  PASP:             31 mmHg    ________________________________________________________________________________________  Electronically signed on 12/23/2023 at 3:54:22 PM by Jeovany France    *** Final ***      [facility] -- Brittany Lutz, Reynaldo Sweeney Savella, , Gera Maya  Office # [phone]    Follow Up:  Afib    Subjective/Observations: Awake and alert.  NC at 2L/min in use but patient denies SOB or DUBOIS.  Denies CP or palpitations      REVIEW OF SYSTEMS: All other review of systems is negative unless indicated above  PAST MEDICAL & SURGICAL HISTORY:  Diabetes      Diabetes mellitus with no complication      Afib      Hypertension      BPH (benign prostatic hyperplasia)      Perforated gastric ulcer  s/p emergent ex-lap omentopexy and plication 6/2019      Pulmonary embolism      History of non-ST elevation myocardial infarction (NSTEMI)      Osteomyelitis  s/p debridement      CAD S/P percutaneous coronary angioplasty      Cerebrovascular accident      H/O abdominal surgery      Perforated gastric ulcer      Traumatic amputation of left foot, initial encounter    MEDICATIONS  (STANDING):  apixaban 5 milliGRAM(s) Oral every 12 hours  aspirin  chewable 81 milliGRAM(s) Oral daily  atorvastatin 40 milliGRAM(s) Oral at bedtime  bethanechol 25 milliGRAM(s) Oral three times a day  cephalexin 500 milliGRAM(s) Oral four times a day  cholecalciferol 1000 Unit(s) Oral daily  dextrose 50% Injectable 25 Gram(s) IV Push once  dextrose 50% Injectable 25 Gram(s) IV Push once  dextrose 50% Injectable 12.5 Gram(s) IV Push once  dextrose Oral Gel 15 Gram(s) Oral once  diltiazem    Tablet 60 milliGRAM(s) Oral every 6 hours  famotidine    Tablet 20 milliGRAM(s) Oral daily  furosemide    Tablet 40 milliGRAM(s) Oral two times a day  gabapentin 300 milliGRAM(s) Oral two times a day  glucagon  Injectable 1 milliGRAM(s) IntraMuscular once  insulin glargine Injectable (LANTUS) 15 Unit(s) SubCutaneous at bedtime  insulin lispro (ADMELOG) corrective regimen sliding scale   SubCutaneous three times a day before meals  insulin lispro (ADMELOG) corrective regimen sliding scale   SubCutaneous at bedtime  insulin lispro Injectable (ADMELOG) 5 Unit(s) SubCutaneous three times a day before meals  isosorbide   mononitrate ER Tablet (IMDUR) 60 milliGRAM(s) Oral daily  losartan 25 milliGRAM(s) Oral daily  melatonin 5 milliGRAM(s) Oral at bedtime  methimazole 10 milliGRAM(s) Oral daily  metoclopramide 5 milliGRAM(s) Oral three times a day  metoprolol tartrate 50 milliGRAM(s) Oral every 8 hours  multivitamin/minerals 1 Tablet(s) Oral daily  oxybutynin 10 milliGRAM(s) Oral two times a day  oxyCODONE  ER Tablet 10 milliGRAM(s) Oral every 12 hours  pantoprazole    Tablet 40 milliGRAM(s) Oral before breakfast  phenazopyridine 100 milliGRAM(s) Oral every 8 hours  polyethylene glycol 3350 17 Gram(s) Oral two times a day  potassium chloride    Tablet ER 10 milliEquivalent(s) Oral daily  saccharomyces boulardii 250 milliGRAM(s) Oral two times a day  senna 2 Tablet(s) Oral at bedtime  sucralfate 1 Gram(s) Oral four times a day  tamsulosin 0.8 milliGRAM(s) Oral at bedtime    MEDICATIONS  (PRN):  acetaminophen     Tablet .. 650 milliGRAM(s) Oral every 6 hours PRN Temp greater or equal to 38C (100.4F), Mild Pain (1 - 3)  bisacodyl 5 milliGRAM(s) Oral every 12 hours PRN Constipation  magnesium hydroxide Suspension 30 milliLiter(s) Oral at bedtime PRN Constipation  sodium chloride 0.65% Nasal 1 Spray(s) Both Nostrils three times a day PRN Nasal Congestion    Allergies    No Known Drug Allergies  fish (Hives)    Intolerances      Vital Signs Last 24 Hrs  T(C): 36.7 (26 Dec 2023 11:42), Max: 36.7 (26 Dec 2023 11:42)  T(F): 98.1 (26 Dec 2023 11:42), Max: 98.1 (26 Dec 2023 11:42)  HR: 78 (26 Dec 2023 11:42) (69 - 78)  BP: 101/65 (26 Dec 2023 11:42) (94/71 - 111/69)  BP(mean): --  RR: 18 (26 Dec 2023 11:42) (18 - 18)  SpO2: 93% (26 Dec 2023 11:42) (92% - 96%)    Parameters below as of 26 Dec 2023 11:42  Patient On (Oxygen Delivery Method): room air      I&O's Summary      PHYSICAL EXAM:  TELE: NOt on tele  Constitutional: NAD, awake and alert, obese  HEENT: Moist Mucous Membranes, Anicteric  Pulmonary: Non-labored, breath sounds are clear bilaterally, No wheezing, rales or rhonchi  Cardiovascular: IRRR, S1 and S2, +murmurs, no rubs, gallops or clicks  Gastrointestinal: Bowel Sounds present, soft, nontender.   Lymph: Trace edema. No lymphadenopathy.  Skin: No visible rashes or ulcers.  Psych:  Mood & affect appropriate  LABS: All Labs Reviewed:    12 Lead ECG:   Ventricular Rate 90 BPM    QRS Duration 78 ms    Q-T Interval 360 ms    QTC Calculation(Bazett) 440 ms    R Axis 25 degrees    T Axis 63 degrees    Diagnosis Line Atrial fibrillation  Low voltage QRS  Abnormal ECG  Confirmed by jeovany France ([id number]) on 12/23/2023 4:37:12 PM (12-23-23 @ 13:08)      TRANSTHORACIC ECHOCARDIOGRAM REPORT  ________________________________________________________________________________                                      _______     Pt. Name:       SARAH MORIRSON Study Date:    12/23/2023  MRN:            [id number]         YOB: 1970  Accession #:    [id number]        Age:           53 years  Account#:       [id number]       Gender:        M  Heart Rate:                      Height:        74.02 in (188.00 cm)  Rhythm:Weight:        255.73 lb (116.00 kg)  Blood Pressure: 102/58 mmHg      BSA/BMI:       2.41 m² / 32.82 kg/m²  ________________________________________________________________________________________  Referring Physician:    [id number] Hill Brizuela  Interpreting Physician: Jeovany France  Primary Sonographer:    Heidi Redd [facility]    CPT:               ECHO TTE WO CON COMP W DOPP - 07499.m  Indication(s):     Abnormal electrocardiogram ECG/EKG - R94.31  Procedure:         Transthoracic echocardiogram with 2-D, M-mode and complete                     spectral and color flow Doppler.  Ordering Location: [facility]    _______________________________________________________________________________________     CONCLUSIONS:      1. Left ventricular systolic function is normal with an ejection fraction visually estimated at 55 to 60 %.   2. Moderate left ventricular hypertrophy.   3. The left ventricular diastolic function is indeterminate.   4. Normal right ventricular cavity size, wall thickness, and systolic function.   5. The left atrium is moderately dilated.   6. The right atrium is moderately dilated in size.   7. There is calcification of the mitral valve annulus.   8. Moderate mitral regurgitation.   9. Calcification of the aortic valve leaflets.  10. Mild tricuspid regurgitation.  11. Estimated pulmonary artery systolic pressure is 31 mmHg, consistent with normal pulmonary artery pressure.  12. Trace pericardial effusion.    ________________________________________________________________________________________  FINDINGS:     Left Ventricle:  Left ventricular systolic function is normal with an ejection fraction visually estimated at 55 to 60%. There are no regional wall motion abnormalities seen. The left ventricular diastolicfunction is indeterminate. Moderate left ventricular hypertrophy.     Right Ventricle:  The right ventricular cavity is normal in size, normal wall thickness and normal systolic function.     Left Atrium:  The left atrium is moderately dilated with an indexed volume of 47.25 ml/m².     Right Atrium:  The right atrium is moderately dilated in size with an indexed volume of 35.48 ml/m².     Aortic Valve:  The aortic valve appears trileaflet. There is calcification of the aortic valve leaflets. There is no aortic valve stenosis. There is no evidence of aortic regurgitation.     Mitral Valve:  There is mitral valve thickening of the anterior and posterior leaflets. There is calcification of the mitral valve annulus. There is moderate mitral regurgitation.     Tricuspid Valve:  Structurally normal tricuspid valve with normal leaflet excursion. There is mild tricuspid regurgitation. Estimated pulmonary artery systolic pressure is 31 mmHg, consistent with normal pulmonary artery pressure.     Pulmonic Valve:  Structurally normal pulmonic valve with normal leaflet excursion.     Aorta:  The aortic root at the sinuses of Valsalva is normal in size, measuring 3.60 cm (indexed 1.49 cm/m²). The aortic arch diameter is normal in size, measuring 2.3 cm (indexed 0.95 cm/m²).     Pericardium:  There is a trace pericardial effusion.     Systemic Veins:  The inferior vena cava is normal in size measuring 1.91 cm in diameter, (normal <2.1cm) with normal inspiratory collapse (normal >50%) consistent with normal right atrial pressure (~3, range 0-5mmHg).  ____________________________________________________________________  QUANTITATIVE DATA:  Left Ventricle Measurements: (Indexed to BSA)     IVSd (2D):   1.4 cm  LVPWd (2D):  1.4 cm  LVIDd (2D):  4.8 cm  LVIDs (2D):  3.3 cm  LV Mass:     283 g  117.1 g/m²  Visualized LV EF%: 55 to 60%     MV E Vmax:    1.49 m/s  e' lateral:   11.90 cm/s  e' medial:    8.38 cm/s  E/e' lateral: 12.52  E/e' medial:  17.78  E/e' Average: 14.69  MV DT:        102 msec    Aorta Measurements: (normal range) (Indexed to BSA)     Sinuses of Valsalva: 3.60 cm (3.1 - 3.7 cm)  Ao Arch:             2.3 cm       Left Atrium Measurements: (Indexed to BSA)  LA Diam 2D: 5.00 cm    Right Atrial Measurements:     RA Vol:  85.60 ml  RA Vol Index: 35.48 ml/m²    Mitral Valve Measurements:     MV E Vmax: 1.5 m/s         MR Vmax:          4.64 m/s                             MR Peak Gradient: 86.1 mmHg    Tricuspid Valve Measurements:     TR Vmax:          2.7 m/s  TR Peak Gradient: 28.3 mmHg  RA Pressure:      3 mmHg  PASP:             31 mmHg    ________________________________________________________________________________________  Electronically signed on 12/23/2023 at 3:54:22 PM by Jeovany France    *** Final ***      [facility] -- Brittany Lutz, Reynaldo Sweeney Savella, , Gera Maya  Office # [phone]    Follow Up:  Afib    Subjective/Observations: Awake and alert.  NC at 2L/min in use but patient denies SOB or DUBOIS.  Denies CP or palpitations      REVIEW OF SYSTEMS: All other review of systems is negative unless indicated above  PAST MEDICAL & SURGICAL HISTORY:  Diabetes      Diabetes mellitus with no complication      Afib      Hypertension      BPH (benign prostatic hyperplasia)      Perforated gastric ulcer  s/p emergent ex-lap omentopexy and plication 6/2019      Pulmonary embolism      History of non-ST elevation myocardial infarction (NSTEMI)      Osteomyelitis  s/p debridement      CAD S/P percutaneous coronary angioplasty      Cerebrovascular accident      H/O abdominal surgery      Perforated gastric ulcer      Traumatic amputation of left foot, initial encounter    MEDICATIONS  (STANDING):  apixaban 5 milliGRAM(s) Oral every 12 hours  aspirin  chewable 81 milliGRAM(s) Oral daily  atorvastatin 40 milliGRAM(s) Oral at bedtime  bethanechol 25 milliGRAM(s) Oral three times a day  cephalexin 500 milliGRAM(s) Oral four times a day  cholecalciferol 1000 Unit(s) Oral daily  dextrose 50% Injectable 25 Gram(s) IV Push once  dextrose 50% Injectable 25 Gram(s) IV Push once  dextrose 50% Injectable 12.5 Gram(s) IV Push once  dextrose Oral Gel 15 Gram(s) Oral once  diltiazem    Tablet 60 milliGRAM(s) Oral every 6 hours  famotidine    Tablet 20 milliGRAM(s) Oral daily  furosemide    Tablet 40 milliGRAM(s) Oral two times a day  gabapentin 300 milliGRAM(s) Oral two times a day  glucagon  Injectable 1 milliGRAM(s) IntraMuscular once  insulin glargine Injectable (LANTUS) 15 Unit(s) SubCutaneous at bedtime  insulin lispro (ADMELOG) corrective regimen sliding scale   SubCutaneous three times a day before meals  insulin lispro (ADMELOG) corrective regimen sliding scale   SubCutaneous at bedtime  insulin lispro Injectable (ADMELOG) 5 Unit(s) SubCutaneous three times a day before meals  isosorbide   mononitrate ER Tablet (IMDUR) 60 milliGRAM(s) Oral daily  losartan 25 milliGRAM(s) Oral daily  melatonin 5 milliGRAM(s) Oral at bedtime  methimazole 10 milliGRAM(s) Oral daily  metoclopramide 5 milliGRAM(s) Oral three times a day  metoprolol tartrate 50 milliGRAM(s) Oral every 8 hours  multivitamin/minerals 1 Tablet(s) Oral daily  oxybutynin 10 milliGRAM(s) Oral two times a day  oxyCODONE  ER Tablet 10 milliGRAM(s) Oral every 12 hours  pantoprazole    Tablet 40 milliGRAM(s) Oral before breakfast  phenazopyridine 100 milliGRAM(s) Oral every 8 hours  polyethylene glycol 3350 17 Gram(s) Oral two times a day  potassium chloride    Tablet ER 10 milliEquivalent(s) Oral daily  saccharomyces boulardii 250 milliGRAM(s) Oral two times a day  senna 2 Tablet(s) Oral at bedtime  sucralfate 1 Gram(s) Oral four times a day  tamsulosin 0.8 milliGRAM(s) Oral at bedtime    MEDICATIONS  (PRN):  acetaminophen     Tablet .. 650 milliGRAM(s) Oral every 6 hours PRN Temp greater or equal to 38C (100.4F), Mild Pain (1 - 3)  bisacodyl 5 milliGRAM(s) Oral every 12 hours PRN Constipation  magnesium hydroxide Suspension 30 milliLiter(s) Oral at bedtime PRN Constipation  sodium chloride 0.65% Nasal 1 Spray(s) Both Nostrils three times a day PRN Nasal Congestion    Allergies    No Known Drug Allergies  fish (Hives)    Intolerances      Vital Signs Last 24 Hrs  T(C): 36.7 (26 Dec 2023 11:42), Max: 36.7 (26 Dec 2023 11:42)  T(F): 98.1 (26 Dec 2023 11:42), Max: 98.1 (26 Dec 2023 11:42)  HR: 78 (26 Dec 2023 11:42) (69 - 78)  BP: 101/65 (26 Dec 2023 11:42) (94/71 - 111/69)  BP(mean): --  RR: 18 (26 Dec 2023 11:42) (18 - 18)  SpO2: 93% (26 Dec 2023 11:42) (92% - 96%)    Parameters below as of 26 Dec 2023 11:42  Patient On (Oxygen Delivery Method): room air      I&O's Summary      PHYSICAL EXAM:  TELE: NOt on tele  Constitutional: NAD, awake and alert, obese  HEENT: Moist Mucous Membranes, Anicteric  Pulmonary: Non-labored, breath sounds are clear bilaterally, No wheezing, rales or rhonchi  Cardiovascular: IRRR, S1 and S2, +murmurs, no rubs, gallops or clicks  Gastrointestinal: Bowel Sounds present, soft, nontender.   Lymph: Trace edema. No lymphadenopathy.  Skin: No visible rashes or ulcers.  Psych:  Mood & affect appropriate  LABS: All Labs Reviewed:    12 Lead ECG:   Ventricular Rate 90 BPM    QRS Duration 78 ms    Q-T Interval 360 ms    QTC Calculation(Bazett) 440 ms    R Axis 25 degrees    T Axis 63 degrees    Diagnosis Line Atrial fibrillation  Low voltage QRS  Abnormal ECG  Confirmed by jeovany France ([id number]) on 12/23/2023 4:37:12 PM (12-23-23 @ 13:08)      TRANSTHORACIC ECHOCARDIOGRAM REPORT  ________________________________________________________________________________                                      _______     Pt. Name:       SARAH MORRISON Study Date:    12/23/2023  MRN:            [id number]         YOB: 1970  Accession #:    [id number]        Age:           53 years  Account#:       [id number]       Gender:        M  Heart Rate:                      Height:        74.02 in (188.00 cm)  Rhythm:Weight:        255.73 lb (116.00 kg)  Blood Pressure: 102/58 mmHg      BSA/BMI:       2.41 m² / 32.82 kg/m²  ________________________________________________________________________________________  Referring Physician:    [id number] Hill Brizuela  Interpreting Physician: Jeovany Franec  Primary Sonographer:    Heidi Redd [facility]    CPT:               ECHO TTE WO CON COMP W DOPP - 95448.m  Indication(s):     Abnormal electrocardiogram ECG/EKG - R94.31  Procedure:         Transthoracic echocardiogram with 2-D, M-mode and complete                     spectral and color flow Doppler.  Ordering Location: [facility]    _______________________________________________________________________________________     CONCLUSIONS:      1. Left ventricular systolic function is normal with an ejection fraction visually estimated at 55 to 60 %.   2. Moderate left ventricular hypertrophy.   3. The left ventricular diastolic function is indeterminate.   4. Normal right ventricular cavity size, wall thickness, and systolic function.   5. The left atrium is moderately dilated.   6. The right atrium is moderately dilated in size.   7. There is calcification of the mitral valve annulus.   8. Moderate mitral regurgitation.   9. Calcification of the aortic valve leaflets.  10. Mild tricuspid regurgitation.  11. Estimated pulmonary artery systolic pressure is 31 mmHg, consistent with normal pulmonary artery pressure.  12. Trace pericardial effusion.    ________________________________________________________________________________________  FINDINGS:     Left Ventricle:  Left ventricular systolic function is normal with an ejection fraction visually estimated at 55 to 60%. There are no regional wall motion abnormalities seen. The left ventricular diastolicfunction is indeterminate. Moderate left ventricular hypertrophy.     Right Ventricle:  The right ventricular cavity is normal in size, normal wall thickness and normal systolic function.     Left Atrium:  The left atrium is moderately dilated with an indexed volume of 47.25 ml/m².     Right Atrium:  The right atrium is moderately dilated in size with an indexed volume of 35.48 ml/m².     Aortic Valve:  The aortic valve appears trileaflet. There is calcification of the aortic valve leaflets. There is no aortic valve stenosis. There is no evidence of aortic regurgitation.     Mitral Valve:  There is mitral valve thickening of the anterior and posterior leaflets. There is calcification of the mitral valve annulus. There is moderate mitral regurgitation.     Tricuspid Valve:  Structurally normal tricuspid valve with normal leaflet excursion. There is mild tricuspid regurgitation. Estimated pulmonary artery systolic pressure is 31 mmHg, consistent with normal pulmonary artery pressure.     Pulmonic Valve:  Structurally normal pulmonic valve with normal leaflet excursion.     Aorta:  The aortic root at the sinuses of Valsalva is normal in size, measuring 3.60 cm (indexed 1.49 cm/m²). The aortic arch diameter is normal in size, measuring 2.3 cm (indexed 0.95 cm/m²).     Pericardium:  There is a trace pericardial effusion.     Systemic Veins:  The inferior vena cava is normal in size measuring 1.91 cm in diameter, (normal <2.1cm) with normal inspiratory collapse (normal >50%) consistent with normal right atrial pressure (~3, range 0-5mmHg).  ____________________________________________________________________  QUANTITATIVE DATA:  Left Ventricle Measurements: (Indexed to BSA)     IVSd (2D):   1.4 cm  LVPWd (2D):  1.4 cm  LVIDd (2D):  4.8 cm  LVIDs (2D):  3.3 cm  LV Mass:     283 g  117.1 g/m²  Visualized LV EF%: 55 to 60%     MV E Vmax:    1.49 m/s  e' lateral:   11.90 cm/s  e' medial:    8.38 cm/s  E/e' lateral: 12.52  E/e' medial:  17.78  E/e' Average: 14.69  MV DT:        102 msec    Aorta Measurements: (normal range) (Indexed to BSA)     Sinuses of Valsalva: 3.60 cm (3.1 - 3.7 cm)  Ao Arch:             2.3 cm       Left Atrium Measurements: (Indexed to BSA)  LA Diam 2D: 5.00 cm    Right Atrial Measurements:     RA Vol:  85.60 ml  RA Vol Index: 35.48 ml/m²    Mitral Valve Measurements:     MV E Vmax: 1.5 m/s         MR Vmax:          4.64 m/s                             MR Peak Gradient: 86.1 mmHg    Tricuspid Valve Measurements:     TR Vmax:          2.7 m/s  TR Peak Gradient: 28.3 mmHg  RA Pressure:      3 mmHg  PASP:             31 mmHg    ________________________________________________________________________________________  Electronically signed on 12/23/2023 at 3:54:22 PM by Jeovany France    *** Final ***

## 2023-12-26 NOTE — DISCHARGE NOTE NURSING/CASE MANAGEMENT/SOCIAL WORK - NSDPFAC_GEN_ALL_CORE
[facility] [phone] [facility] [phone]

## 2023-12-26 NOTE — PROGRESS NOTE ADULT - PROVIDER SPECIALTY LIST ADULT
Cardiology
Cardiology
Family Medicine
Infectious Disease
Cardiology
Cardiology
Infectious Disease
Pulmonology
Pulmonology
Endocrinology
Cardiology
Cardiology
Infectious Disease
Infectious Disease
Pulmonology
Pulmonology
Endocrinology
Infectious Disease
Internal Medicine
Family Medicine
Family Medicine
Internal Medicine

## 2023-12-26 NOTE — PROGRESS NOTE ADULT - SUBJECTIVE AND OBJECTIVE BOX
Date/Time Patient Seen:  		  Referring MD:   Data Reviewed	       Patient is a 53y old  Male who presents with a chief complaint of UTI (25 Dec 2023 11:59)      Subjective/HPI     PAST MEDICAL & SURGICAL HISTORY:  No pertinent past medical history    Diabetes    No pertinent past medical history    Diabetes mellitus with no complication    Afib    Hypertension    BPH (benign prostatic hyperplasia)    Perforated gastric ulcer  s/p emergent ex-lap omentopexy and plication 6/2019    Pulmonary embolism    History of non-ST elevation myocardial infarction (NSTEMI)    Osteomyelitis  s/p debridement    CAD S/P percutaneous coronary angioplasty    Cerebrovascular accident    No significant past surgical history    No significant past surgical history    H/O abdominal surgery    Perforated gastric ulcer    Traumatic amputation of left foot, initial encounter          Medication list         MEDICATIONS  (STANDING):  apixaban 5 milliGRAM(s) Oral every 12 hours  aspirin  chewable 81 milliGRAM(s) Oral daily  atorvastatin 40 milliGRAM(s) Oral at bedtime  bethanechol 25 milliGRAM(s) Oral three times a day  cephalexin 500 milliGRAM(s) Oral four times a day  cholecalciferol 1000 Unit(s) Oral daily  dextrose 50% Injectable 25 Gram(s) IV Push once  dextrose 50% Injectable 25 Gram(s) IV Push once  dextrose 50% Injectable 12.5 Gram(s) IV Push once  dextrose Oral Gel 15 Gram(s) Oral once  diltiazem    Tablet 60 milliGRAM(s) Oral every 6 hours  famotidine    Tablet 20 milliGRAM(s) Oral daily  furosemide    Tablet 40 milliGRAM(s) Oral two times a day  gabapentin 300 milliGRAM(s) Oral two times a day  glucagon  Injectable 1 milliGRAM(s) IntraMuscular once  insulin glargine Injectable (LANTUS) 15 Unit(s) SubCutaneous at bedtime  insulin lispro (ADMELOG) corrective regimen sliding scale   SubCutaneous three times a day before meals  insulin lispro (ADMELOG) corrective regimen sliding scale   SubCutaneous at bedtime  insulin lispro Injectable (ADMELOG) 5 Unit(s) SubCutaneous three times a day before meals  isosorbide   mononitrate ER Tablet (IMDUR) 60 milliGRAM(s) Oral daily  losartan 100 milliGRAM(s) Oral daily  melatonin 5 milliGRAM(s) Oral at bedtime  methimazole 10 milliGRAM(s) Oral daily  metoclopramide 5 milliGRAM(s) Oral three times a day  metoprolol tartrate 50 milliGRAM(s) Oral every 8 hours  multivitamin/minerals 1 Tablet(s) Oral daily  oxybutynin 10 milliGRAM(s) Oral two times a day  oxyCODONE  ER Tablet 10 milliGRAM(s) Oral every 12 hours  pantoprazole    Tablet 40 milliGRAM(s) Oral before breakfast  phenazopyridine 100 milliGRAM(s) Oral every 8 hours  polyethylene glycol 3350 17 Gram(s) Oral two times a day  potassium chloride    Tablet ER 10 milliEquivalent(s) Oral daily  saccharomyces boulardii 250 milliGRAM(s) Oral two times a day  senna 2 Tablet(s) Oral at bedtime  sucralfate 1 Gram(s) Oral four times a day  tamsulosin 0.8 milliGRAM(s) Oral at bedtime    MEDICATIONS  (PRN):  acetaminophen     Tablet .. 650 milliGRAM(s) Oral every 6 hours PRN Temp greater or equal to 38C (100.4F), Mild Pain (1 - 3)  bisacodyl 5 milliGRAM(s) Oral every 12 hours PRN Constipation  magnesium hydroxide Suspension 30 milliLiter(s) Oral at bedtime PRN Constipation  sodium chloride 0.65% Nasal 1 Spray(s) Both Nostrils three times a day PRN Nasal Congestion         Vitals log        ICU Vital Signs Last 24 Hrs  T(C): 36.6 (26 Dec 2023 05:15), Max: 36.7 (25 Dec 2023 12:07)  T(F): 97.8 (26 Dec 2023 05:15), Max: 98 (25 Dec 2023 12:07)  HR: 77 (26 Dec 2023 05:15) (66 - 77)  BP: 107/70 (26 Dec 2023 05:15) (94/71 - 112/68)  BP(mean): --  ABP: --  ABP(mean): --  RR: 18 (26 Dec 2023 05:15) (18 - 18)  SpO2: 92% (26 Dec 2023 05:15) (92% - 96%)    O2 Parameters below as of 26 Dec 2023 05:15  Patient On (Oxygen Delivery Method): room air  O2 Flow (L/min): 2               Input and Output:  I&O's Detail      Lab Data                  Review of Systems	      Objective     Physical Examination    heart s1s2  lung dc BS  head nc      Pertinent Lab findings & Imaging      [place]:  NO   Adequate UO     I&O's Detail           Discussed with:     Cultures:	        Radiology

## 2023-12-26 NOTE — DISCHARGE NOTE NURSING/CASE MANAGEMENT/SOCIAL WORK - NSDCPEELIQUISDIET_GEN_ALL_CORE
[address] [facility]  Deepak Thomas. MD Rodo Noyola. MD Marcell Vaughn. MD Jackie [place]       [id number]  1/13/2017        3/20/1931    REFERRING PHYSICIAN:  Abel    CHIEF COMPLAINT:  Shortness of breath fatigue    HISTORY OF PRESENT ILLNESS:  The patient is a 78 y.o. female who is seen for evaluation of severe aortic stenosis. 78year-old woman with AS and CAD. [facility] [place] [place]. She has become increasingly symptomatic with exertional dyspnea and fatigue over the last several years. AVR and CABG was recommended 5 years ago but patient refused surgery. Her CAD has been medically managed. Her AS has progressed. Last echo showed calcified aortic valve with mean gradient 40.6mmHg and PSV 4.02 m/s. Moderate AI. LVEF 60%. She had left heart cath today showing only moderate disease in the LAD and circumflex systems. No history of stroke, TIA, prior chest surgery, anesthetic complication, lung disease. Has diabetes that is medically managed. Has severe spinal stenosis causing difficulty with ambulation and chronic pain.        Past Medical History   Diagnosis Date    Aortic stenosis 11/24/2015    Arrhythmia      murmur with sinus rhythm    Arthritis      osteo-right hand, left hip area    At risk for falling 9/4/2014    Bruit (arterial) 11/24/2015    Cancer (HCC)      basal cell right nose    Chest pain 11/24/2015    Chronic pain     Diabetes ([address]      controlled by diet; fbs 96  - 120    Dizzy      2-3 times a year    Essential hypertension, benign 9/4/2014    Frequent UTI      last time 11/11    Hyperlipidemia 9/4/2014    Hypertension     Hypothyroidism     Murmur, cardiac      dx'ed 2004    Type II or unspecified type diabetes mellitus without mention of complication, not stated as uncontrolled 9/4/2014    Unspecified adverse effect of anesthesia 2004     elevated BP w/ trigger finger surgery     Urinary incontinence wears a pad    Visual disturbances 11/24/2015       Past Surgical History   Procedure Laterality Date    Hx cystocele repair      Hx other surgical Right      basal cell nose    Hx orthopaedic       2 back-one fusion-low; all fingers-trigger    Hx rectocele repair      Hx hysterectomy      Hx appendectomy      Hx oophorectomy       Partial    Hx knee arthroscopy Right     Hx carpal tunnel release Left        Family History   Problem Relation Age of Onset    Heart Attack Father      age 68    Heart Disease Father    [employer] Arthritis-osteo Mother     Heart Disease Sister     Arthritis-osteo Sister     Arthritis-osteo Brother     Arthritis-osteo Sister     Arthritis-osteo Sister        Social History     Social History    Marital status:      Spouse name: N/A    Number of children: N/A    Years of education: N/A     Occupational History    Not on file. Social History Main Topics    Smoking status: Never Smoker    Smokeless tobacco: Never Used    Alcohol use No    Drug use: No    Sexual activity: Not on file     Other Topics Concern    Not on file     Social History Narrative       Allergies   Allergen Reactions    Aleve [Naproxen Sodium] Rash    Aspirin Anaphylaxis     Hives, swelling    Carafate [Sucralfate] Hives    Clorazepate Dipotassium Hives    Flexeril [Cyclobenzaprine] Itching    Robaxin [Methocarbamol] Anaphylaxis     Swelling of tongue, wheezing  Muscle relaxers in general    Vytorin 10-10 [Ezetimibe-Simvastatin] Other (comments)     Muscle soreness    Zantac [Ranitidine Hcl] Itching       No current facility-administered medications on file prior to encounter. Current Outpatient Prescriptions on File Prior to Encounter   Medication Sig Dispense Refill    amLODIPine (NORVASC) 10 mg tablet Take 1 Tab by mouth daily. 90 Tab 3    traMADol (ULTRAM) 50 mg tablet Take 1 Tab by mouth every six (6) hours as needed for Pain.  90 Tab 2    levothyroxine (SYNTHROID) 100 mcg tablet Take 1 Tab by mouth Daily (before breakfast). 90 Tab 1    benazepril (LOTENSIN) 20 mg tablet Take 1 Tab by mouth daily. 90 Tab 1    furosemide (LASIX) 40 mg tablet Take 1 Tab by mouth daily. 90 Tab 1    clopidogrel (PLAVIX) 75 mg tablet Recently started for aortic stenosis. 90 Tab 1    metoprolol tartrate (LOPRESSOR) 25 mg tablet Take 1 Tab by mouth two (2) times a day. 180 Tab 3    rosuvastatin (CRESTOR) 20 mg tablet Take 1 Tab by mouth nightly. (Patient taking differently: Take 10 mg by mouth nightly.) 30 Tab 5    ASCORBIC ACID/VITAMIN E (CRANBERRY CONCENTRATE PO) Take  by mouth.  nitroglycerin (NITROSTAT) 0.4 mg SL tablet 1 Tab by SubLINGual route every five (5) minutes as needed. 25 Tab 11    FREESTYLE LITE STRIPS strip CHECK BLOOD SUGAR EVERY DAY 50 Strip 11       REVIEW OF SYSTEMS:  GENERAL:  No weight loss. No fever. EYES:  No diplopia. No vision loss. ENTM:  No hearing loss. No trouble swallowing. No hoarseness. CARDIAC:  See present illness. No angina. Exertional dyspnea  PULMONARY:  Denies asthma or chronic pulmonary disease. GI:  No change in bowel habits. No bleeding. :  No dysuria. No history of renal stones or renal insufficiency. MS:  Chronic back pain  NEURO:  No stroke or seizure disorder. HEME/LYMPHATIC:  No history of bleeding tendencies or DVT. PSYCHIATRIC:  No history of depression. PHYSICAL EXAMINATION:  GENERAL APPEARANCE:  Well developed. Well nourished. No distress. HEENT:  Normocephalic. Extraocular muscles are intact. No scleral icterus. NECK/LYMPHATIC:  Supple. No thyromegaly or adenopathy. CHEST WALL: No deformity. LUNGS: Lungs are clear to auscultation. CARDIAC:  RRR III/VI RANDY at RSB. No carotid bruits. No jugular venous distention. No peripheral edema. Normal peripheral pulses  VASCULAR:  Pulses are full and equal at the radial arteries and the popliteal arteries bilaterally. There is no venous stasis disease.   ABDOMEN:  Soft and non-tender. No pulsatile masses. SKIN:  No rashes, cyanosis, jaundice, ecchymoses or evidence of skin breakdown. EXTREMITIES:  Full range of motion. No deformity. NEURO:  Grossly intact. No focal deficit. PSYCHIATRIC: Alert, cooperative and oriented times three. Normal speech and affect. IMPRESSION:  Symptomatic severe AS  Moderate 2 vessel CAD     PLAN:  I have personally viewed the cardiac catheterization, echocardiogram, labs     She is at least intermediate risk for surgical AVR. She is also frail and I do not think has any significant rehabilitation potential after conventional surgical AVR. Therefore I have recommended TAVR. I discussed in detail with Ms. Oleary and her family, both the alternatives to, and risks and benefits of TAVR and explained the deportment of the operation. Patient saw our teaching video. Risks include but are not limited to: bleeding, infection, vascular complications, need for sternotomy and cardiopulmonary bypass, renal failure, respiratory failure, pacemaker, myocardial infarction, stroke and death. All of her questions were answered and she would like to proceed with TAVR evaluation.           SFD CATH 2 ALL EVENTS Eat healthy foods you enjoy. Apixaban/Eliquis DOES NOT have a special diet. Limit your alcohol intake.

## 2023-12-26 NOTE — DISCHARGE NOTE NURSING/CASE MANAGEMENT/SOCIAL WORK - SOCIAL WORKER'S NAME
Linda Leiva [facility] [phone] Linda Leiva [facility] [phone]

## 2023-12-26 NOTE — DISCHARGE NOTE NURSING/CASE MANAGEMENT/SOCIAL WORK - NSDCVIVACCINE_GEN_ALL_CORE_FT
pneumococcal polysaccharide PPV23; 31-Oct-2019 13:45; Gabbie Andrew (RN); [employer] [id number] (Exp. Date: 07-Aug-2020); IntraMuscular; Deltoid Right.; 0.5 milliLiter(s); VIS (VIS Published: 06-Oct-2009, VIS Presented: 31-Oct-2019);    pneumococcal polysaccharide PPV23; 31-Oct-2019 13:45; Gabbie Andrew (RN); [employer] [id number] (Exp. Date: 07-Aug-2020); IntraMuscular; Deltoid Right.; 0.5 milliLiter(s); VIS (VIS Published: 06-Oct-2009, VIS Presented: 31-Oct-2019);

## 2023-12-26 NOTE — PROGRESS NOTE ADULT - ASSESSMENT
54yo M with a PMH of CVA, chronic PE (on Eliquis), atrial fibrillation, hypertension, BPH, type 2 diabetes, chronic Aguilera, hyperlipidemia, GERD, CAD (s/p stent to RCA), peripheral vascular disease, hypothyroid who presents to the ED for SOB, chills and lethargy, found to be in rapid Afib w/ RVR. Patient states he used to follow with Cardio Dr. [place], but has not followed up with Cardio in many years.     A fib RVR, chr PE, HTN, HLD, CAD, stents, PVD  - Hx of PAF but no clear documentation on whether the patient has had recent episodes of Afib  - Previously on Amiodarone, but d/c due to thyroid dysfunction  - EKbpm, Afib w/ RVR  - Now rate-controlled  - Switch Cardizem CD to 240 mg daily  - Continue Eliquis   - Continue Metoprolol 50mg Q8H     - BP stable  - Continue home Losartan and Imdur    - Hx of CAD s/p 1 stent to RCA   - EKbpm, Afib w/ RVR  - Troponin: <-98.2 likely elevated in the setting of demand ischemia  - Continue ASA and statin    - No anginal complaints     - Pro bnp 3508  - TTE (): EF 65%, mild MR, concentric LVH, grade II diastolic dysfunction    - TTE (2023) LVSF normal with EF 55 to 60 %, mod LVH, mod MR, Trace pericardial effusion.  - Continue home Lasix 40mg PO daily     - Monitor and replete Lytes. Keep K > 4 and Mg > 2  - DC planning per primary   - Will continue to follow.    Henrietta Kim DNP, NP-C, AGACNP-C  Cardiology   Call TEAMS

## 2023-12-26 NOTE — CHART NOTE - NSCHARTNOTEFT_GEN_A_CORE
Do you have Advance Directives (HCP / LV / Organ donation / Documentation of oral advance Directive):   ( x   )  yes    (      )    NO                                                                            Do you have LV - Living will :                                                                                                                                             (   x )  yes    (      )   No    Do you have HCP - Health Care Proxy:                                                                                                                            (   x  )  yes   (       ) N0    Do you have DNR- Do Not Resuscitate :                                                                                                                           (      )  yes  (   x     )  No    Do you have DNI- Do Not intubate  :                                                                                                                               (      )  yes   (   x    ) No    Do you have MOLST - Medical orders for Life sustaining treatment  :                                                                    (      ) yes    (    x   ) No    Decision Maker :  (  x   ) Patient     (      )  HCA   (     ) Public Health Law Surrogate     (      ) Surrogate  (       ) Guardian    Goals of Care :  (  x    )   Complete Care     (       ) No Limitations                              (       )   Comfort Care       (       )  Hospice                               (      )   Limited medical Intervention / s    Medical Interventions :   (    x    )   CPR       (        )  DNR                                               (   x     )  Intubation with MV - Mechanical Ventilation  (   x   ) BIPAP/CPAP    (         )   DNI                                               (    x     )  Artificial Nutrition -  IVF, TPN / PPN, Tube Feeds             (         )   No Feeding Tube                                                (   x     ) Use Antibiotics                         (          ) No Antibiotics                                                (    x     ) Blood and Blood Products     (         )   No Blood or Blood products                                                (     x     )  Dialysis                                    (         )  No Dialysis                                                (          )  Medical Management only  (         )  No Invasive Interventions or Surgery  Time spent :                        (    x   ) upto 30 minutes                       (           )   more than 30 minutes  ACP reviewed and discussed

## 2023-12-26 NOTE — DISCHARGE NOTE NURSING/CASE MANAGEMENT/SOCIAL WORK - NSDCPEFALRISK_GEN_ALL_CORE
For information on Fall & Injury Prevention, visit: [email or web address] OR  [email or web address] OR  https://www.cdc.gov/steadi/patient.html For information on Fall & Injury Prevention, visit: [email or web address] OR  [email or web address] OR  https://www.cdc.gov/steadi/patient.html

## 2023-12-26 NOTE — SOCIAL WORK PROGRESS NOTE - NSSWPROGRESSNOTE_GEN_ALL_CORE
Discussed today at rounds. Appears request for auth or denial was sent tp  [facility] on 12/22 per [facility]'s request. Patient cleared for d/c on 12/23. I left message at [facility] this AM to follow up. MD confirmed patient remains ready for d/c. [facility] agreed to take patient back today and arranged TLC ambulette for 12:30PM today. I met with patient at bedside and informed him. Updated SHAWNA sent to [facility]. Nurse on unit aware of discharge time.  Discussed today at rounds. Appears request for auth or denial was sent tp  [facility] on 12/22 per [facility]'s request. Patient cleared for d/c on 12/23. I left message at [facility] this AM to follow up. MD confirmed patient remains ready for d/c. [facility] agreed to take patient back today and arranged TLC ambulette for 12:30PM today. I met with patient at bedside and informed him. Updated SHAWNA sent to [facility]. Nurse on unit aware of discharge time.

## 2023-12-26 NOTE — PROGRESS NOTE ADULT - NS ATTEND AMEND GEN_ALL_CORE FT
Patient is a 54yo M with a PMH of CVA, chronic PE (on Eliquis), atrial fibrillation, hypertension, BPH, type 2 diabetes, chronic Aguilera, hyperlipidemia, GERD, CAD (s/p stent to RCA), peripheral vascular disease, hypothyroid who presents to the ED for SOB, chills and lethargy, found to be in rapid Afib w/ RVR.     hx of paf, but does not have definite sxs, and unclear burden of af  previously on amio, stopped for thyroid issues  now with rapid af in the setting of apparent recurrent urinary infection   s/p diltiazem gtt, now po. Switch to long acting prior to discharge  cont bb  ac  mild elev trop  Continue PO lasix on dc  repeat echo with normal LV function, mod mr  titrate off 02.  DC planning

## 2023-12-26 NOTE — DISCHARGE NOTE NURSING/CASE MANAGEMENT/SOCIAL WORK - PATIENT PORTAL LINK FT
You can access the FollowMyHealth Patient Portal offered by [facility] by registering at the following website: [email or web address]. By joining [employer]’s FollowMyHealth portal, you will also be able to view your health information using other applications (apps) compatible with our system. You can access the FollowMyHealth Patient Portal offered by [facility] by registering at the following website: [email or web address]. By joining [employer] [facility] [employer]’s FollowMyHealth portal, you will also be able to view your health information using other applications (apps) compatible with our system.

## 2024-02-22 NOTE — H&P ADULT - PROBLEM/PLAN-4
2024    Amilcar Akbar DO  [address]  [address]  [facility] [address]    Patient: Anthony Schuler   YOB: 1950   Date of Visit: 2024     Encounter Diagnosis     ICD-10-CM    1. Recurrent falls  R29.6       2. Ambulatory dysfunction  R26.2           Dear Dr. Akbar:    Thank you for your recent referral of Anthony Schuler. Please review the attached evaluation summary from Anthony's recent visit.     Please verify that you agree with the plan of care by signing the attached order.     If you have any questions or concerns, please do not hesitate to call.     I sincerely appreciate the opportunity to share in the care of one of your patients and hope to have another opportunity to work with you in the near future.       Sincerely,    Nichole Brunner, PT      Referring Provider:      I certify that I have read the below Plan of Care and certify the need for these services furnished under this plan of treatment while under my care.                    Amilcar Akbar DO  [address]  [address]  [facility] [address]  Via In Basket                                                                              PT Maintenance Re-evaluation        POC expires Auth Status Total   Visits  Start date  Expiration date PT/OT + Visit Limit? Co-Insurance    [id number]  10 12/14/23  3/4/24     3/4/2024  15 12/26/23 3/26/24                                        Visit/Unit Tracking  AUTH Status: Date 1/2 1/4 1/9 1/16 1/18 1/23 1/25 1/30 2/1 2/6 2/15 2/20 2/22     Authed: 15 Used 1 1 1 1 1 1 1 1 1 1 1 1 1   APPROVED 15 VISITS  Remaining  14 13 12 11 10 9 8 7 6 5 4 3 2         Today's date: 2024  Patient name: Anthony Schuler  : 1950  MRN: [id number]  Referring provider: Amilcar Akbar DO  Dx:   Encounter Diagnosis     ICD-10-CM    1. Recurrent falls  R29.6       2. Ambulatory dysfunction  R26.2             Assessment  Assessment details: Patient is a 73 y.o. Male who has been  making steady gains in skilled outpatient PT with no falls since previous re-evaluation. He has made improvements with gait speed, TUG cog (2nd trial) and FGA suggesting improvements in gait speed, dual tasking and dynamic balance. He has remained consistent with ROMERO outcome measure, indicating maintenance with static balance. Mild non-significant regressions noted with 5 x STS and TUG which may be attributed to current sickness. He illustrated decreased distance ambulated on 6 MWT, however he was able to ambulate with SPC vs NBQC which illustrates improvement in dynamic balance. He continues to be a HIGH risk for falls as he remains below age matched normative values for the aforementioned outcome measures. Patient continues to be appropriate for maintenance skilled PT to maintain current gains and prevent regression. Regression is likely, due to history of regression noted, history of falls with injury, and limited ability for family member/caregiver to assist with HEP. The patient would be at risk for increased injury if he had LOB or fall due to inadequate safety equipment, which could lead to hospitalization and increased healthcare costs. Patient has met one additional goal, maintained all met goals and continues to work towards remaining goals. Patient would benefit from skilled outpatient physical therapy to address the above impairments and patient is in good verbal agreement.        Barb vsJuve Cooney (2013): a [place] District Court decision that sought to clarify that Medicare will in fact cover skilled therapy services to maintain a patient’s current condition or prevent/slow further deterioration.    Patient verbalized understanding of POC.    Please contact me if you have any questions or recommendations. Thank you for the referral and the opportunity to share in Anthony Schuler's care.    Cut off score   All date taken from APTA Neuro Section or Rehab Measures    Romero/56  MDC: 6 pts  Age  Norms:  60-69: M - 55   F - 55  70-79: M - 54   F - 53  80-89: M - 53   F - 50 5xSTS: Liborio et al 2010  MDC: 2.3 sec  Age Norms:  60-69: 11.1 sec  70-79: 12.6 sec  80-89: 14.8 sec   TUG  MDC: 4.14 sec  Cut off score:  >13.5 sec community dwelling adults  >32.2 frail elderly  <20 I for basic transfers  >30 dependent on transfers 10 Meter Walk Test: Kandice et al 2011  20-29: M - 1.35 m   F - 1.34 m  30-39: M - 1.43 m   F - 1.34 m  40-49: M - 1.43 m   F - 1.39 m  50-59: M - 1.43 m   F - 1.31 m  60-69: M - 1.34 m   F - 1.24 m  70-79: M - 1.26 m   F - 1.13 m  80-89: M - 0.97 m   F - 0.94 m   FGA  MCID: 4 pts  Geriatrics/community < 22/30 fall risk  Geriatrics/community < 20/30 unexplained falls DGI  MDC: vestibular - 4 pts  MDC: geriatric/community - 3 pts  Falls risk <19/24   6 Minute Walk Test  Age Norms  60-69: M - 1876 ft   F - 1765 ft  70-79: M - 1729 ft   F - 1545 ft  80-89: M - 1368 ft   F - 1286 ft mCTSIB  Norm: 20-60 yrs  Eyes open firm: norm sway 0.21-0.48  Eyes closed firm: norm sway 0.48-0.99  Eyes open foam: norm sway 0.38-0.71  Eyes closed foam: norm sway 0.70-2.22             Impairments: abnormal coordination, abnormal gait, activity intolerance, impaired balance, impaired physical strength, lacks appropriate HEP, poor posture, and pain with function  Understanding of Dx/Px/POC: Good  Prognosis: Good      Goals (16 weeks):    - Patient will ambulate outdoors on uneven surfaces with limited assistance to facilitate safe community ambulation- ongoing  - Patient will maintain gait speed per 10 meter walk test at 0.83 m/s to facilitate continued safety with community ambulation- met  - Patient will continue to score at least 45/56 on ROMERO to maintain fall risk status to reduce risk of injury- met  - Patient will be able to ambulate at least 950 ft during 6 Minute Walk Test to maintain current cardiovascular capacity- not met   - Patient will attend PT 1x/week with focus on balance and gait  training to maintain functional capacity- met   - Patient will remain at 15.15 seconds on TUG and TUG variations without AD to maintain fall risk status- met  - Patient will maintain LE strength at current level of 4/5 strength to maintain level of dependence with transfers- met  - Patient will be able to get in and out of the car with minimal difficulties- ongoing         Plan  Plan details:   Patient would benefit from: Skilled PT  Planned modality interventions: Biofeedback, Cryotherapy, and Thermotherapy: Hydrocollator Packs  Planned therapy interventions: abdominal trunk stabilization, ADL training, body mechanics training, coordination, flexibility, functional ROM exercises, HEP, motor coordination training, neuromuscular re-education, patient education, postural training, strengthening, stretching, therapeutic activities, and therapeutic exercises  Frequency: 1x/wk  Duration in weeks: 16  Plan of Care beginning date: 2/22/24  Plan of Care expiration date: 16 weeks- 6/13/24  Treatment plan discussed with: Patient        Subjective Evaluation    History of Present Illness  Mechanism of injury: Patient reports to treatment with NBQC, stating he is feeling ok today. He continues to report difficulty with his balance, especially when navigating uneven ground and feels that his balance his not very good. He also complains of knee pain which also contributes to his activity limitations.     Updated 12/4/23:  Patient reports that his balance has gotten better, however he still feels like he needs to carry his cane around. Reports feeling shaky with uneven ground. Tries to avoid stairs.     Updated 1/18/24: Patient reports that he got a shot in his R knee, however is still having pain, as well as the R hip. Continues to use cane for primary use of ambulation; still avoiding stairs. He feels like he can continue working on his balance.     Updated 2/20/24: Patient reports continued knee pain, however does feel like his  balance, strength and endurance continues to be the same. He is currently fighting a cold right now. Uses SPC at this date.     Pain  Current pain ratin/10  Location: B/L knees    Social Support  - Steps to enter house: 0  - Stairs in house: 0   - Lives in: apartemtn  - Lives with: alone, spouse  2023.      - Employment status: retired   - Hand dominance: right     Treatments  Previous treatment: PT  Current treatment: OT      Objective   Coordination  LE:  - Heel to shin: Normal  - Alternating toe taps: Abnormal,slowed LLE      LE Strength (MMT)  - L hip flexion: 4/5   R hip flexion: 4/5  - L hip abduction: 4/5  R hip abduction: 4/5  - L hip adduction: 3+/5  R hip adduction: 3+/5  - L knee extension: 4/5  R knee extension: 4/5  - L knee flexion: 4/5  R knee flexion: 4/5  - L ankle DF: 5/5   R ankle DF: 5/5  - L ankle PF: 5/5   R ankle PF: 5/5      Anticipatory Reactions  - Anterior Balance Reaction: Required 2-3 steps  - Lateral Balance Reaction: Would fall without PT assistance  - Posterior Balance Reaction: Required 2-3 steps      Transfers and Mobility  - Assistance Level with Ambulation: NBQC  - Primary AD: NBQC    Assistance Level with Transfers:  - STS: 2 UE assist  - Return to sit: 2 UE assist  - Floor to stand: unable to complete    Gait  - Observed gait abnormalities: Wide AYANA, antalgic gait  - Gait speed: 0.83 m/s w/ NBQC  - Difficulty with environmental obstacles such as: steps, curbs    24:   BP: 157/71 mmHg      Outcome Measures IE/PN   + 23 Re-evaluation  23 Re-evaluation  24 Re-evaluation  24   5x STS 14.06 sec w/ airex + 2 UE 11.22 sec w/airex + 2 UE 12.33 sec w/airex + 0 UE 13.83 sec w/airex + 0 UE   TUG  TUG cog 15.15 sec  17.04 sec (counting backward by 3's) 13.10 sec  1st trial: 20.16 sec  2nd trial: 13.18 sec 13.36 sec   1st trial: 15.43 sec  2nd trial: 15.91 sec 15.26 sec  1st trial: 16.23 sec  2nd trial: 14.84 sec   10 Meter Walk Test 0.83 m/s with  NBQC 0.73 m/s 0.78 m/s 0.94 m/s   6 Minute Walk Test 950 ft, with NBQC 850 ft, w/NBQC 810ft, w/NBQC 700 ft W/SPC   FGA 15/30 15/30 14/30  16/30   ROMERO 45/56 47/56 48/56 48/56              Precautions:   Past Medical History:   Diagnosis Date   • Anemia    • Aneurysm of middle cerebral artery    • Cancer (HCC)     colon ca   • Cerebral aneurysm    • Cholelithiasis     last assessed-7/26/2012   • Colon cancer (HCC) 2000    transverse colon   • Colon polyp    • Colon, diverticulosis     last assessed-7/1/2014   • Eczema    • Edema     lower legs   • Full dentures    • GERD (gastroesophageal reflux disease)    • Hemangioma     right breast area   • Hemorrhoids    • Hyperlipidemia    • Hypertension    • Kidney stone     passed on his own   • Memory loss    • Obesity    • Rectal bleeding    • Vitamin D deficiency     resolved-11/17/2017                      DISPLAY PLAN FREE TEXT

## 2024-03-16 ENCOUNTER — INPATIENT (INPATIENT)
Facility: HOSPITAL | Age: 54
LOS: 12 days | Discharge: NURSING, REHAB OR LONG-TERM CARE | DRG: 987 | End: 2024-03-29
Attending: INTERNAL MEDICINE | Admitting: FAMILY MEDICINE
Payer: MEDICAID

## 2024-03-16 VITALS
HEIGHT: 74 IN | OXYGEN SATURATION: 95 % | DIASTOLIC BLOOD PRESSURE: 75 MMHG | WEIGHT: 244.93 LBS | RESPIRATION RATE: 20 BRPM | HEART RATE: 93 BPM | TEMPERATURE: 98 F | SYSTOLIC BLOOD PRESSURE: 104 MMHG

## 2024-03-16 DIAGNOSIS — Z98.890 OTHER SPECIFIED POSTPROCEDURAL STATES: Chronic | ICD-10-CM

## 2024-03-16 DIAGNOSIS — S98.912A COMPLETE TRAUMATIC AMPUTATION OF LEFT FOOT, LEVEL UNSPECIFIED, INITIAL ENCOUNTER: Chronic | ICD-10-CM

## 2024-03-16 DIAGNOSIS — K25.5 CHRONIC OR UNSPECIFIED GASTRIC ULCER WITH PERFORATION: Chronic | ICD-10-CM

## 2024-03-16 LAB
ALBUMIN SERPL ELPH-MCNC: 2.8 G/DL — LOW (ref 3.3–5)
ALP SERPL-CCNC: 103 U/L — SIGNIFICANT CHANGE UP (ref 40–120)
ALT FLD-CCNC: 20 U/L — SIGNIFICANT CHANGE UP (ref 12–78)
ANION GAP SERPL CALC-SCNC: 9 MMOL/L — SIGNIFICANT CHANGE UP (ref 5–17)
APPEARANCE UR: ABNORMAL
APTT BLD: 29.8 SEC — SIGNIFICANT CHANGE UP (ref 24.5–35.6)
AST SERPL-CCNC: 38 U/L — HIGH (ref 15–37)
BILIRUB SERPL-MCNC: 0.6 MG/DL — SIGNIFICANT CHANGE UP (ref 0.2–1.2)
BILIRUB UR-MCNC: ABNORMAL
BUN SERPL-MCNC: 14 MG/DL — SIGNIFICANT CHANGE UP (ref 7–23)
CALCIUM SERPL-MCNC: 9.1 MG/DL — SIGNIFICANT CHANGE UP (ref 8.5–10.1)
CHLORIDE SERPL-SCNC: 108 MMOL/L — SIGNIFICANT CHANGE UP (ref 96–108)
CO2 SERPL-SCNC: 27 MMOL/L — SIGNIFICANT CHANGE UP (ref 22–31)
COLOR SPEC: SIGNIFICANT CHANGE UP
CREAT SERPL-MCNC: 1.3 MG/DL — SIGNIFICANT CHANGE UP (ref 0.5–1.3)
DIFF PNL FLD: ABNORMAL
EGFR: 65 ML/MIN/1.73M2 — SIGNIFICANT CHANGE UP
GLUCOSE SERPL-MCNC: 115 MG/DL — HIGH (ref 70–99)
GLUCOSE UR QL: NEGATIVE MG/DL — SIGNIFICANT CHANGE UP
HCT VFR BLD CALC: 38.9 % — LOW (ref 39–50)
HGB BLD-MCNC: 12.4 G/DL — LOW (ref 13–17)
INR BLD: 1.31 RATIO — HIGH (ref 0.85–1.18)
KETONES UR-MCNC: NEGATIVE MG/DL — SIGNIFICANT CHANGE UP
LACTATE SERPL-SCNC: 3.1 MMOL/L — HIGH (ref 0.7–2)
LEUKOCYTE ESTERASE UR-ACNC: ABNORMAL
MCHC RBC-ENTMCNC: 30.5 PG — SIGNIFICANT CHANGE UP (ref 27–34)
MCHC RBC-ENTMCNC: 31.9 GM/DL — LOW (ref 32–36)
MCV RBC AUTO: 95.6 FL — SIGNIFICANT CHANGE UP (ref 80–100)
NITRITE UR-MCNC: POSITIVE
PH UR: 6.5 — SIGNIFICANT CHANGE UP (ref 5–8)
PLATELET # BLD AUTO: 239 K/UL — SIGNIFICANT CHANGE UP (ref 150–400)
POTASSIUM SERPL-MCNC: 4.8 MMOL/L — SIGNIFICANT CHANGE UP (ref 3.5–5.3)
POTASSIUM SERPL-SCNC: 4.8 MMOL/L — SIGNIFICANT CHANGE UP (ref 3.5–5.3)
PROT SERPL-MCNC: 7.6 G/DL — SIGNIFICANT CHANGE UP (ref 6–8.3)
PROT UR-MCNC: 100 MG/DL
PROTHROM AB SERPL-ACNC: 15.2 SEC — HIGH (ref 9.5–13)
RBC # BLD: 4.07 M/UL — LOW (ref 4.2–5.8)
RBC # FLD: 13.1 % — SIGNIFICANT CHANGE UP (ref 10.3–14.5)
SODIUM SERPL-SCNC: 144 MMOL/L — SIGNIFICANT CHANGE UP (ref 135–145)
SP GR SPEC: 1.01 — SIGNIFICANT CHANGE UP (ref 1–1.03)
TROPONIN I, HIGH SENSITIVITY RESULT: 51.5 NG/L — SIGNIFICANT CHANGE UP
UROBILINOGEN FLD QL: 1 MG/DL — SIGNIFICANT CHANGE UP (ref 0.2–1)
WBC # BLD: 3.9 K/UL — SIGNIFICANT CHANGE UP (ref 3.8–10.5)
WBC # FLD AUTO: 3.9 K/UL — SIGNIFICANT CHANGE UP (ref 3.8–10.5)

## 2024-03-16 PROCEDURE — 71045 X-RAY EXAM CHEST 1 VIEW: CPT | Mod: 26

## 2024-03-16 PROCEDURE — 99285 EMERGENCY DEPT VISIT HI MDM: CPT

## 2024-03-16 PROCEDURE — 93010 ELECTROCARDIOGRAM REPORT: CPT

## 2024-03-16 RX ORDER — ACETAMINOPHEN 500 MG
650 TABLET ORAL ONCE
Refills: 0 | Status: COMPLETED | OUTPATIENT
Start: 2024-03-16 | End: 2024-03-16

## 2024-03-16 RX ORDER — PIPERACILLIN AND TAZOBACTAM 4; .5 G/20ML; G/20ML
3.38 INJECTION, POWDER, LYOPHILIZED, FOR SOLUTION INTRAVENOUS ONCE
Refills: 0 | Status: COMPLETED | OUTPATIENT
Start: 2024-03-16 | End: 2024-03-16

## 2024-03-16 RX ORDER — IPRATROPIUM/ALBUTEROL SULFATE 18-103MCG
3 AEROSOL WITH ADAPTER (GRAM) INHALATION ONCE
Refills: 0 | Status: COMPLETED | OUTPATIENT
Start: 2024-03-16 | End: 2024-03-16

## 2024-03-16 RX ORDER — SODIUM CHLORIDE 9 MG/ML
2500 INJECTION INTRAMUSCULAR; INTRAVENOUS; SUBCUTANEOUS ONCE
Refills: 0 | Status: COMPLETED | OUTPATIENT
Start: 2024-03-16 | End: 2024-03-16

## 2024-03-16 RX ADMIN — Medication 650 MILLIGRAM(S): at 23:07

## 2024-03-16 RX ADMIN — SODIUM CHLORIDE 2500 MILLILITER(S): 9 INJECTION INTRAMUSCULAR; INTRAVENOUS; SUBCUTANEOUS at 23:07

## 2024-03-16 RX ADMIN — Medication 3 MILLILITER(S): at 23:07

## 2024-03-16 NOTE — ED PROVIDER NOTE - CLINICAL SUMMARY MEDICAL DECISION MAKING FREE TEXT BOX
53-year-old male with significant past medical history for neurogenic bladder with indwelling Aguilera catheter, A-fib, diabetes, coronary artery disease, PE, CVA presents to the ED from nursing home with complaints of fever, generalized weakness, shortness of breath.  Cardiac labs rule out ACS, D-dimer rule out PE, x-ray rule out pneumonia, IV fluids and antibiotics as needed.

## 2024-03-16 NOTE — ED ADULT NURSE NOTE - CHPI ED NUR SYMPTOMS POS
----- Message from Meenakshi King sent at 2020  5:00 PM CST -----  DIFFICULTY BREATHING

## 2024-03-16 NOTE — ED ADULT NURSE NOTE - CHIEF COMPLAINT QUOTE
per ems from [facility] with shortness of breath, high blood pressure and coude jacques catheter dislodged. per ems the facility said pt's oxygen saturation was 92% on room air

## 2024-03-16 NOTE — ED PROVIDER NOTE - OBJECTIVE STATEMENT
53 hx afib, indwelling jacques, cad s/p stents, CVA, DM, htn, osteomyelitis s/p debridement, PE, on ASA/Plavix sent from [facility] for elevated BP, SOB, dislodged jacques. Pt states he was having difficulty breathing today, couldn't catch his breath. Was found to be febrile. Facility gave 10 mg oxycodone @ 820PM today. Currently on pyridium. Denies feeling feverish, cp, abd pain, vomiting, diarrhea.     PMD blaise koenig

## 2024-03-16 NOTE — ED ADULT TRIAGE NOTE - CHIEF COMPLAINT QUOTE
per ems from [facility] with shortness of breath, high blood pressure and coude jacques catheter dislodged. per ems the facility said pt's oxygen saturation was 92% on room air

## 2024-03-16 NOTE — ED ADULT NURSE NOTE - OBJECTIVE STATEMENT
Pt. received alert and oriented x3 with chief complaint of SOB w/ fever starting today. Pt. also states that his chronic indwelling jacques came out on its own today and facility was unable to insert another. Pt. placed on continuous cardiac monitor with continuous pulse ox. Pt. placed on 2L nasal cannula of O2.

## 2024-03-16 NOTE — ED PROVIDER NOTE - CARE PLAN
1 Principal Discharge DX:	Sepsis   Principal Discharge DX:	Sepsis  Secondary Diagnosis:	Acute UTI  Secondary Diagnosis:	Rapid atrial fibrillation

## 2024-03-17 DIAGNOSIS — A41.9 SEPSIS, UNSPECIFIED ORGANISM: ICD-10-CM

## 2024-03-17 DIAGNOSIS — I10 ESSENTIAL (PRIMARY) HYPERTENSION: ICD-10-CM

## 2024-03-17 DIAGNOSIS — R06.00 DYSPNEA, UNSPECIFIED: ICD-10-CM

## 2024-03-17 DIAGNOSIS — I48.91 UNSPECIFIED ATRIAL FIBRILLATION: ICD-10-CM

## 2024-03-17 DIAGNOSIS — E11.9 TYPE 2 DIABETES MELLITUS WITHOUT COMPLICATIONS: ICD-10-CM

## 2024-03-17 LAB
-  CTX-M RESISTANCE MARKER: SIGNIFICANT CHANGE UP
-  ESBL: SIGNIFICANT CHANGE UP
A1C WITH ESTIMATED AVERAGE GLUCOSE RESULT: 7 % — HIGH (ref 4–5.6)
ALBUMIN SERPL ELPH-MCNC: 2.5 G/DL — LOW (ref 3.3–5)
ALP SERPL-CCNC: 86 U/L — SIGNIFICANT CHANGE UP (ref 40–120)
ALT FLD-CCNC: 19 U/L — SIGNIFICANT CHANGE UP (ref 12–78)
ANION GAP SERPL CALC-SCNC: 9 MMOL/L — SIGNIFICANT CHANGE UP (ref 5–17)
AST SERPL-CCNC: 17 U/L — SIGNIFICANT CHANGE UP (ref 15–37)
BASE EXCESS BLDV CALC-SCNC: 6.1 MMOL/L — HIGH (ref -2–3)
BASOPHILS # BLD AUTO: 0 K/UL — SIGNIFICANT CHANGE UP (ref 0–0.2)
BASOPHILS # BLD AUTO: 0.06 K/UL — SIGNIFICANT CHANGE UP (ref 0–0.2)
BASOPHILS NFR BLD AUTO: 0 % — SIGNIFICANT CHANGE UP (ref 0–2)
BASOPHILS NFR BLD AUTO: 0.3 % — SIGNIFICANT CHANGE UP (ref 0–2)
BILIRUB SERPL-MCNC: 0.5 MG/DL — SIGNIFICANT CHANGE UP (ref 0.2–1.2)
BLOOD GAS COMMENTS, VENOUS: SIGNIFICANT CHANGE UP
BUN SERPL-MCNC: 14 MG/DL — SIGNIFICANT CHANGE UP (ref 7–23)
CALCIUM SERPL-MCNC: 8.1 MG/DL — LOW (ref 8.5–10.1)
CHLORIDE SERPL-SCNC: 110 MMOL/L — HIGH (ref 96–108)
CO2 SERPL-SCNC: 26 MMOL/L — SIGNIFICANT CHANGE UP (ref 22–31)
CREAT SERPL-MCNC: 1.2 MG/DL — SIGNIFICANT CHANGE UP (ref 0.5–1.3)
E COLI DNA BLD POS QL NAA+NON-PROBE: SIGNIFICANT CHANGE UP
EGFR: 71 ML/MIN/1.73M2 — SIGNIFICANT CHANGE UP
EOSINOPHIL # BLD AUTO: 0.09 K/UL — SIGNIFICANT CHANGE UP (ref 0–0.5)
EOSINOPHIL # BLD AUTO: 0.12 K/UL — SIGNIFICANT CHANGE UP (ref 0–0.5)
EOSINOPHIL NFR BLD AUTO: 0.5 % — SIGNIFICANT CHANGE UP (ref 0–6)
EOSINOPHIL NFR BLD AUTO: 3 % — SIGNIFICANT CHANGE UP (ref 0–6)
ESTIMATED AVERAGE GLUCOSE: 154 MG/DL — HIGH (ref 68–114)
GAS PNL BLDV: SIGNIFICANT CHANGE UP
GLUCOSE BLDC GLUCOMTR-MCNC: 128 MG/DL — HIGH (ref 70–99)
GLUCOSE BLDC GLUCOMTR-MCNC: 169 MG/DL — HIGH (ref 70–99)
GLUCOSE BLDC GLUCOMTR-MCNC: 172 MG/DL — HIGH (ref 70–99)
GLUCOSE BLDC GLUCOMTR-MCNC: 188 MG/DL — HIGH (ref 70–99)
GLUCOSE SERPL-MCNC: 119 MG/DL — HIGH (ref 70–99)
GRAM STN FLD: ABNORMAL
HCO3 BLDV-SCNC: 30 MMOL/L — HIGH (ref 22–29)
HCT VFR BLD CALC: 37.4 % — LOW (ref 39–50)
HGB BLD-MCNC: 12 G/DL — LOW (ref 13–17)
IMM GRANULOCYTES NFR BLD AUTO: 1.5 % — HIGH (ref 0–0.9)
LACTATE SERPL-SCNC: 1.8 MMOL/L — SIGNIFICANT CHANGE UP (ref 0.7–2)
LACTATE SERPL-SCNC: 3.7 MMOL/L — HIGH (ref 0.7–2)
LYMPHOCYTES # BLD AUTO: 0.39 K/UL — LOW (ref 1–3.3)
LYMPHOCYTES # BLD AUTO: 0.54 K/UL — LOW (ref 1–3.3)
LYMPHOCYTES # BLD AUTO: 10 % — LOW (ref 13–44)
LYMPHOCYTES # BLD AUTO: 2.8 % — LOW (ref 13–44)
MAGNESIUM SERPL-MCNC: 1.5 MG/DL — LOW (ref 1.6–2.6)
MCHC RBC-ENTMCNC: 30.9 PG — SIGNIFICANT CHANGE UP (ref 27–34)
MCHC RBC-ENTMCNC: 32.1 GM/DL — SIGNIFICANT CHANGE UP (ref 32–36)
MCV RBC AUTO: 96.4 FL — SIGNIFICANT CHANGE UP (ref 80–100)
METHOD TYPE: SIGNIFICANT CHANGE UP
MONOCYTES # BLD AUTO: 0.12 K/UL — SIGNIFICANT CHANGE UP (ref 0–0.9)
MONOCYTES # BLD AUTO: 2.33 K/UL — HIGH (ref 0–0.9)
MONOCYTES NFR BLD AUTO: 12.1 % — SIGNIFICANT CHANGE UP (ref 2–14)
MONOCYTES NFR BLD AUTO: 3 % — SIGNIFICANT CHANGE UP (ref 2–14)
NEUTROPHILS # BLD AUTO: 16 K/UL — HIGH (ref 1.8–7.4)
NEUTROPHILS # BLD AUTO: 3.28 K/UL — SIGNIFICANT CHANGE UP (ref 1.8–7.4)
NEUTROPHILS NFR BLD AUTO: 78 % — HIGH (ref 43–77)
NEUTROPHILS NFR BLD AUTO: 82.8 % — HIGH (ref 43–77)
NRBC # BLD: 0 /100 WBCS — SIGNIFICANT CHANGE UP (ref 0–0)
NRBC # BLD: SIGNIFICANT CHANGE UP /100 WBCS (ref 0–0)
NT-PROBNP SERPL-SCNC: 7622 PG/ML — HIGH (ref 0–125)
PCO2 BLDV: 42 MMHG — SIGNIFICANT CHANGE UP (ref 42–55)
PH BLDV: 7.46 — HIGH (ref 7.32–7.43)
PLATELET # BLD AUTO: 188 K/UL — SIGNIFICANT CHANGE UP (ref 150–400)
PO2 BLDV: 130 MMHG — HIGH (ref 25–45)
POTASSIUM SERPL-MCNC: 4 MMOL/L — SIGNIFICANT CHANGE UP (ref 3.5–5.3)
POTASSIUM SERPL-SCNC: 4 MMOL/L — SIGNIFICANT CHANGE UP (ref 3.5–5.3)
PROCALCITONIN SERPL-MCNC: 20.02 NG/ML — HIGH
PROT SERPL-MCNC: 6.5 G/DL — SIGNIFICANT CHANGE UP (ref 6–8.3)
RAPID RVP RESULT: SIGNIFICANT CHANGE UP
RBC # BLD: 3.88 M/UL — LOW (ref 4.2–5.8)
RBC # FLD: 13.3 % — SIGNIFICANT CHANGE UP (ref 10.3–14.5)
SAO2 % BLDV: 100 % — HIGH (ref 67–88)
SARS-COV-2 RNA SPEC QL NAA+PROBE: SIGNIFICANT CHANGE UP
SODIUM SERPL-SCNC: 145 MMOL/L — SIGNIFICANT CHANGE UP (ref 135–145)
SPECIMEN SOURCE: SIGNIFICANT CHANGE UP
SPECIMEN SOURCE: SIGNIFICANT CHANGE UP
T3 SERPL-MCNC: 85 NG/DL — SIGNIFICANT CHANGE UP (ref 80–200)
T4 AB SER-ACNC: 6.7 UG/DL — SIGNIFICANT CHANGE UP (ref 4.6–12)
T4 FREE SERPL-MCNC: 1.4 NG/DL — SIGNIFICANT CHANGE UP (ref 0.9–1.8)
TSH SERPL-MCNC: 1.75 UIU/ML — SIGNIFICANT CHANGE UP (ref 0.36–3.74)
WBC # BLD: 19.3 K/UL — HIGH (ref 3.8–10.5)
WBC # FLD AUTO: 19.3 K/UL — HIGH (ref 3.8–10.5)

## 2024-03-17 PROCEDURE — 74176 CT ABD & PELVIS W/O CONTRAST: CPT | Mod: 26

## 2024-03-17 PROCEDURE — 71275 CT ANGIOGRAPHY CHEST: CPT | Mod: 26

## 2024-03-17 RX ORDER — GLUCAGON INJECTION, SOLUTION 0.5 MG/.1ML
1 INJECTION, SOLUTION SUBCUTANEOUS ONCE
Refills: 0 | Status: DISCONTINUED | OUTPATIENT
Start: 2024-03-17 | End: 2024-03-26

## 2024-03-17 RX ORDER — DIGOXIN 250 MCG
250 TABLET ORAL DAILY
Refills: 0 | Status: DISCONTINUED | OUTPATIENT
Start: 2024-03-17 | End: 2024-03-22

## 2024-03-17 RX ORDER — DILTIAZEM HCL 120 MG
10 CAPSULE, EXT RELEASE 24 HR ORAL
Qty: 125 | Refills: 0 | Status: DISCONTINUED | OUTPATIENT
Start: 2024-03-17 | End: 2024-03-17

## 2024-03-17 RX ORDER — PIPERACILLIN AND TAZOBACTAM 4; .5 G/20ML; G/20ML
3.38 INJECTION, POWDER, LYOPHILIZED, FOR SOLUTION INTRAVENOUS EVERY 8 HOURS
Refills: 0 | Status: DISCONTINUED | OUTPATIENT
Start: 2024-03-17 | End: 2024-03-17

## 2024-03-17 RX ORDER — OXYCODONE HYDROCHLORIDE 5 MG/1
10 TABLET ORAL
Refills: 0 | Status: DISCONTINUED | OUTPATIENT
Start: 2024-03-17 | End: 2024-03-24

## 2024-03-17 RX ORDER — ATORVASTATIN CALCIUM 80 MG/1
40 TABLET, FILM COATED ORAL AT BEDTIME
Refills: 0 | Status: DISCONTINUED | OUTPATIENT
Start: 2024-03-17 | End: 2024-03-26

## 2024-03-17 RX ORDER — LANOLIN ALCOHOL/MO/W.PET/CERES
5 CREAM (GRAM) TOPICAL AT BEDTIME
Refills: 0 | Status: DISCONTINUED | OUTPATIENT
Start: 2024-03-17 | End: 2024-03-26

## 2024-03-17 RX ORDER — CEPHALEXIN 500 MG
1 CAPSULE ORAL
Qty: 0 | Refills: 0 | DISCHARGE

## 2024-03-17 RX ORDER — METOPROLOL TARTRATE 50 MG
25 TABLET ORAL
Refills: 0 | Status: DISCONTINUED | OUTPATIENT
Start: 2024-03-17 | End: 2024-03-23

## 2024-03-17 RX ORDER — ONDANSETRON 8 MG/1
4 TABLET, FILM COATED ORAL EVERY 6 HOURS
Refills: 0 | Status: DISCONTINUED | OUTPATIENT
Start: 2024-03-17 | End: 2024-03-26

## 2024-03-17 RX ORDER — ACETAMINOPHEN 500 MG
650 TABLET ORAL EVERY 6 HOURS
Refills: 0 | Status: DISCONTINUED | OUTPATIENT
Start: 2024-03-17 | End: 2024-03-26

## 2024-03-17 RX ORDER — ASCORBIC ACID 60 MG
500 TABLET,CHEWABLE ORAL DAILY
Refills: 0 | Status: DISCONTINUED | OUTPATIENT
Start: 2024-03-17 | End: 2024-03-26

## 2024-03-17 RX ORDER — METOCLOPRAMIDE HCL 10 MG
5 TABLET ORAL THREE TIMES A DAY
Refills: 0 | Status: DISCONTINUED | OUTPATIENT
Start: 2024-03-17 | End: 2024-03-26

## 2024-03-17 RX ORDER — SUCRALFATE 1 G
1 TABLET ORAL
Refills: 0 | Status: DISCONTINUED | OUTPATIENT
Start: 2024-03-17 | End: 2024-03-26

## 2024-03-17 RX ORDER — OXYBUTYNIN CHLORIDE 5 MG
5 TABLET ORAL
Refills: 0 | Status: DISCONTINUED | OUTPATIENT
Start: 2024-03-17 | End: 2024-03-26

## 2024-03-17 RX ORDER — FERROUS SULFATE 325(65) MG
325 TABLET ORAL
Refills: 0 | Status: DISCONTINUED | OUTPATIENT
Start: 2024-03-17 | End: 2024-03-26

## 2024-03-17 RX ORDER — INSULIN LISPRO 100/ML
VIAL (ML) SUBCUTANEOUS AT BEDTIME
Refills: 0 | Status: DISCONTINUED | OUTPATIENT
Start: 2024-03-17 | End: 2024-03-20

## 2024-03-17 RX ORDER — INSULIN GLARGINE 100 [IU]/ML
7 INJECTION, SOLUTION SUBCUTANEOUS AT BEDTIME
Refills: 0 | Status: DISCONTINUED | OUTPATIENT
Start: 2024-03-17 | End: 2024-03-26

## 2024-03-17 RX ORDER — PANTOPRAZOLE SODIUM 20 MG/1
40 TABLET, DELAYED RELEASE ORAL
Refills: 0 | Status: DISCONTINUED | OUTPATIENT
Start: 2024-03-17 | End: 2024-03-26

## 2024-03-17 RX ORDER — SODIUM CHLORIDE 9 MG/ML
1000 INJECTION, SOLUTION INTRAVENOUS
Refills: 0 | Status: DISCONTINUED | OUTPATIENT
Start: 2024-03-17 | End: 2024-03-26

## 2024-03-17 RX ORDER — CLOTRIMAZOLE AND BETAMETHASONE DIPROPIONATE 10; .5 MG/G; MG/G
1 CREAM TOPICAL
Refills: 0 | DISCHARGE

## 2024-03-17 RX ORDER — DEXTROSE 50 % IN WATER 50 %
25 SYRINGE (ML) INTRAVENOUS ONCE
Refills: 0 | Status: DISCONTINUED | OUTPATIENT
Start: 2024-03-17 | End: 2024-03-26

## 2024-03-17 RX ORDER — POLYETHYLENE GLYCOL 3350 17 G/17G
17 POWDER, FOR SOLUTION ORAL
Refills: 0 | Status: DISCONTINUED | OUTPATIENT
Start: 2024-03-17 | End: 2024-03-26

## 2024-03-17 RX ORDER — MAGNESIUM SULFATE 500 MG/ML
2 VIAL (ML) INJECTION ONCE
Refills: 0 | Status: COMPLETED | OUTPATIENT
Start: 2024-03-17 | End: 2024-03-17

## 2024-03-17 RX ORDER — LANOLIN ALCOHOL/MO/W.PET/CERES
3 CREAM (GRAM) TOPICAL AT BEDTIME
Refills: 0 | Status: DISCONTINUED | OUTPATIENT
Start: 2024-03-17 | End: 2024-03-17

## 2024-03-17 RX ORDER — DEXTROSE 50 % IN WATER 50 %
12.5 SYRINGE (ML) INTRAVENOUS ONCE
Refills: 0 | Status: DISCONTINUED | OUTPATIENT
Start: 2024-03-17 | End: 2024-03-26

## 2024-03-17 RX ORDER — SODIUM CHLORIDE 0.65 %
1 AEROSOL, SPRAY (ML) NASAL DAILY
Refills: 0 | Status: DISCONTINUED | OUTPATIENT
Start: 2024-03-17 | End: 2024-03-26

## 2024-03-17 RX ORDER — TIOTROPIUM BROMIDE 18 UG/1
2 CAPSULE ORAL; RESPIRATORY (INHALATION) DAILY
Refills: 0 | Status: DISCONTINUED | OUTPATIENT
Start: 2024-03-17 | End: 2024-03-26

## 2024-03-17 RX ORDER — INSULIN LISPRO 100/ML
VIAL (ML) SUBCUTANEOUS
Refills: 0 | Status: DISCONTINUED | OUTPATIENT
Start: 2024-03-17 | End: 2024-03-20

## 2024-03-17 RX ORDER — SODIUM CHLORIDE 9 MG/ML
1000 INJECTION INTRAMUSCULAR; INTRAVENOUS; SUBCUTANEOUS
Refills: 0 | Status: DISCONTINUED | OUTPATIENT
Start: 2024-03-17 | End: 2024-03-18

## 2024-03-17 RX ORDER — DEXTROSE 50 % IN WATER 50 %
15 SYRINGE (ML) INTRAVENOUS ONCE
Refills: 0 | Status: DISCONTINUED | OUTPATIENT
Start: 2024-03-17 | End: 2024-03-26

## 2024-03-17 RX ORDER — ASPIRIN/CALCIUM CARB/MAGNESIUM 324 MG
81 TABLET ORAL DAILY
Refills: 0 | Status: DISCONTINUED | OUTPATIENT
Start: 2024-03-17 | End: 2024-03-25

## 2024-03-17 RX ORDER — DILTIAZEM HCL 120 MG
10 CAPSULE, EXT RELEASE 24 HR ORAL
Qty: 125 | Refills: 0 | Status: DISCONTINUED | OUTPATIENT
Start: 2024-03-17 | End: 2024-03-18

## 2024-03-17 RX ORDER — SACCHAROMYCES BOULARDII 250 MG
250 POWDER IN PACKET (EA) ORAL
Refills: 0 | Status: DISCONTINUED | OUTPATIENT
Start: 2024-03-17 | End: 2024-03-26

## 2024-03-17 RX ORDER — HYDROCORTISONE 1 %
1 OINTMENT (GRAM) TOPICAL
Refills: 0 | DISCHARGE

## 2024-03-17 RX ORDER — MULTIVIT-MIN/FERROUS GLUCONATE 9 MG/15 ML
1 LIQUID (ML) ORAL DAILY
Refills: 0 | Status: DISCONTINUED | OUTPATIENT
Start: 2024-03-17 | End: 2024-03-26

## 2024-03-17 RX ORDER — SENNA PLUS 8.6 MG/1
2 TABLET ORAL AT BEDTIME
Refills: 0 | Status: DISCONTINUED | OUTPATIENT
Start: 2024-03-17 | End: 2024-03-26

## 2024-03-17 RX ORDER — DILTIAZEM HCL 120 MG
10 CAPSULE, EXT RELEASE 24 HR ORAL ONCE
Refills: 0 | Status: COMPLETED | OUTPATIENT
Start: 2024-03-17 | End: 2024-03-17

## 2024-03-17 RX ORDER — INSULIN GLARGINE 100 [IU]/ML
15 INJECTION, SOLUTION SUBCUTANEOUS EVERY MORNING
Refills: 0 | Status: DISCONTINUED | OUTPATIENT
Start: 2024-03-17 | End: 2024-03-26

## 2024-03-17 RX ORDER — DIGOXIN 250 MCG
500 TABLET ORAL ONCE
Refills: 0 | Status: COMPLETED | OUTPATIENT
Start: 2024-03-17 | End: 2024-03-17

## 2024-03-17 RX ORDER — ERTAPENEM SODIUM 1 G/1
1000 INJECTION, POWDER, LYOPHILIZED, FOR SOLUTION INTRAMUSCULAR; INTRAVENOUS EVERY 24 HOURS
Refills: 0 | Status: DISCONTINUED | OUTPATIENT
Start: 2024-03-17 | End: 2024-03-26

## 2024-03-17 RX ORDER — NALOXEGOL OXALATE 12.5 MG/1
25 TABLET, FILM COATED ORAL DAILY
Refills: 0 | Status: DISCONTINUED | OUTPATIENT
Start: 2024-03-17 | End: 2024-03-26

## 2024-03-17 RX ORDER — GABAPENTIN 400 MG/1
300 CAPSULE ORAL EVERY 12 HOURS
Refills: 0 | Status: DISCONTINUED | OUTPATIENT
Start: 2024-03-17 | End: 2024-03-26

## 2024-03-17 RX ORDER — LORATADINE 10 MG/1
10 TABLET ORAL DAILY
Refills: 0 | Status: DISCONTINUED | OUTPATIENT
Start: 2024-03-17 | End: 2024-03-26

## 2024-03-17 RX ORDER — CHOLECALCIFEROL (VITAMIN D3) 125 MCG
1000 CAPSULE ORAL DAILY
Refills: 0 | Status: DISCONTINUED | OUTPATIENT
Start: 2024-03-17 | End: 2024-03-26

## 2024-03-17 RX ORDER — CEFTRIAXONE 500 MG/1
2000 INJECTION, POWDER, FOR SOLUTION INTRAMUSCULAR; INTRAVENOUS EVERY 24 HOURS
Refills: 0 | Status: DISCONTINUED | OUTPATIENT
Start: 2024-03-17 | End: 2024-03-17

## 2024-03-17 RX ORDER — ALBUTEROL 90 UG/1
2.5 AEROSOL, METERED ORAL EVERY 4 HOURS
Refills: 0 | Status: DISCONTINUED | OUTPATIENT
Start: 2024-03-17 | End: 2024-03-26

## 2024-03-17 RX ORDER — DILTIAZEM HCL 120 MG
5 CAPSULE, EXT RELEASE 24 HR ORAL
Qty: 125 | Refills: 0 | Status: DISCONTINUED | OUTPATIENT
Start: 2024-03-17 | End: 2024-03-17

## 2024-03-17 RX ORDER — APIXABAN 2.5 MG/1
5 TABLET, FILM COATED ORAL EVERY 12 HOURS
Refills: 0 | Status: DISCONTINUED | OUTPATIENT
Start: 2024-03-17 | End: 2024-03-25

## 2024-03-17 RX ADMIN — APIXABAN 5 MILLIGRAM(S): 2.5 TABLET, FILM COATED ORAL at 17:18

## 2024-03-17 RX ADMIN — Medication 10 MG/HR: at 09:18

## 2024-03-17 RX ADMIN — Medication 0: at 07:45

## 2024-03-17 RX ADMIN — ATORVASTATIN CALCIUM 40 MILLIGRAM(S): 80 TABLET, FILM COATED ORAL at 22:10

## 2024-03-17 RX ADMIN — Medication 250 MICROGRAM(S): at 07:16

## 2024-03-17 RX ADMIN — APIXABAN 5 MILLIGRAM(S): 2.5 TABLET, FILM COATED ORAL at 07:16

## 2024-03-17 RX ADMIN — ERTAPENEM SODIUM 120 MILLIGRAM(S): 1 INJECTION, POWDER, LYOPHILIZED, FOR SOLUTION INTRAMUSCULAR; INTRAVENOUS at 22:48

## 2024-03-17 RX ADMIN — Medication 10 MG/HR: at 07:56

## 2024-03-17 RX ADMIN — Medication 5 MILLIGRAM(S): at 22:30

## 2024-03-17 RX ADMIN — Medication 25 GRAM(S): at 10:26

## 2024-03-17 RX ADMIN — Medication 1000 UNIT(S): at 12:44

## 2024-03-17 RX ADMIN — Medication 5 MG/HR: at 01:29

## 2024-03-17 RX ADMIN — Medication 250 MILLIGRAM(S): at 17:17

## 2024-03-17 RX ADMIN — SENNA PLUS 2 TABLET(S): 8.6 TABLET ORAL at 22:09

## 2024-03-17 RX ADMIN — PIPERACILLIN AND TAZOBACTAM 200 GRAM(S): 4; .5 INJECTION, POWDER, LYOPHILIZED, FOR SOLUTION INTRAVENOUS at 00:00

## 2024-03-17 RX ADMIN — NALOXEGOL OXALATE 25 MILLIGRAM(S): 12.5 TABLET, FILM COATED ORAL at 12:59

## 2024-03-17 RX ADMIN — OXYCODONE HYDROCHLORIDE 10 MILLIGRAM(S): 5 TABLET ORAL at 17:18

## 2024-03-17 RX ADMIN — Medication 5 MILLIGRAM(S): at 22:10

## 2024-03-17 RX ADMIN — Medication 10 MILLIGRAM(S): at 00:59

## 2024-03-17 RX ADMIN — Medication 1 GRAM(S): at 17:18

## 2024-03-17 RX ADMIN — CEFTRIAXONE 100 MILLIGRAM(S): 500 INJECTION, POWDER, FOR SOLUTION INTRAMUSCULAR; INTRAVENOUS at 17:16

## 2024-03-17 RX ADMIN — Medication 25 MILLIGRAM(S): at 17:17

## 2024-03-17 RX ADMIN — POLYETHYLENE GLYCOL 3350 17 GRAM(S): 17 POWDER, FOR SOLUTION ORAL at 18:06

## 2024-03-17 RX ADMIN — INSULIN GLARGINE 15 UNIT(S): 100 INJECTION, SOLUTION SUBCUTANEOUS at 09:40

## 2024-03-17 RX ADMIN — Medication 10 MG/HR: at 14:45

## 2024-03-17 RX ADMIN — Medication 1: at 17:17

## 2024-03-17 RX ADMIN — Medication 25 MILLIGRAM(S): at 11:11

## 2024-03-17 RX ADMIN — PIPERACILLIN AND TAZOBACTAM 25 GRAM(S): 4; .5 INJECTION, POWDER, LYOPHILIZED, FOR SOLUTION INTRAVENOUS at 14:45

## 2024-03-17 RX ADMIN — SODIUM CHLORIDE 100 MILLILITER(S): 9 INJECTION INTRAMUSCULAR; INTRAVENOUS; SUBCUTANEOUS at 03:48

## 2024-03-17 RX ADMIN — LORATADINE 10 MILLIGRAM(S): 10 TABLET ORAL at 12:44

## 2024-03-17 RX ADMIN — Medication 325 MILLIGRAM(S): at 18:06

## 2024-03-17 RX ADMIN — Medication 1 TABLET(S): at 12:59

## 2024-03-17 RX ADMIN — Medication 10 MILLIGRAM(S): at 12:44

## 2024-03-17 RX ADMIN — INSULIN GLARGINE 7 UNIT(S): 100 INJECTION, SOLUTION SUBCUTANEOUS at 22:10

## 2024-03-17 RX ADMIN — Medication 5 MILLIGRAM(S): at 17:16

## 2024-03-17 RX ADMIN — Medication 5 MILLIGRAM(S): at 17:18

## 2024-03-17 RX ADMIN — Medication 1: at 11:08

## 2024-03-17 RX ADMIN — GABAPENTIN 300 MILLIGRAM(S): 400 CAPSULE ORAL at 17:17

## 2024-03-17 RX ADMIN — Medication 500 MICROGRAM(S): at 04:08

## 2024-03-17 RX ADMIN — Medication 500 MILLIGRAM(S): at 12:44

## 2024-03-17 RX ADMIN — TIOTROPIUM BROMIDE 2 PUFF(S): 18 CAPSULE ORAL; RESPIRATORY (INHALATION) at 22:56

## 2024-03-17 RX ADMIN — Medication 81 MILLIGRAM(S): at 12:44

## 2024-03-17 RX ADMIN — Medication 1 SPRAY(S): at 12:59

## 2024-03-17 NOTE — H&P ADULT - HISTORY OF PRESENT ILLNESS
This is a 53M with PMH of AF on Eliquis, indwelling jacques, CAD s/p stents, CVA, DM, HTN, osteomyelitis s/p debridement, PE, sent from [facility] for elevated BP, SOB, and dislodged jacques. Patient states he was having difficulty breathing yesterday and that he couldn't catch his breath. Denies feeling feverish, cp, abd pain, vomiting, diarrhea. He was found to be febrile to 103.8, lactate 3.1 --> 3.7. He was also found to be in DEIDRE. He received iv cardizem 10mg ivp x 1 and then was started on iv cardizem 10mg/hr gtt. It was dropped to 5mg/hr overnight due to hypotension. The patient was digoxin loaded due to hypotension and persistent AF with RVR in 140s-150s.

## 2024-03-17 NOTE — ED ADULT NURSE REASSESSMENT NOTE - NS ED NURSE REASSESS COMMENT FT1
resident called several times for updates on pt status. HR remains above 140, cardizem continues at 10 ml/hr. Per resident decrease cardizem drip to 5 and initiate digoxin.

## 2024-03-17 NOTE — CONSULT NOTE ADULT - NS ATTEND AMEND GEN_ALL_CORE FT
53M with PMH of AF on Eliquis, indwelling Aguilera CAD s/p stents, CVA, DM, HTN, osteomyelitis s/p debridement, PE, sent from [facility] for elevated BP, SOB, and dislodged Aguilera, found to be febrile in ER, a/w sepsis and A fib RVR.     - Known A fib, now -150s on admission likely secondary to urosepsis  - On Cardizem 60 mg PO Q6H and Toprol 50 mg at home   - S/p Cardizem 10mg IVP x 1 and then was started on iv Cardizem 10mg/hr gtt.   - Resume BB with hold parameters  - S/p digoxin loaded ( digoxin 500 mcg IV + 250 mg PO) due to hypotension and persistent AF with RVR in 140s-150s.   - Tele with A fib improved rates 110s  - Continue digoxin 250 mg PO daily   - Continue Eliquis for AC, admits compliance  - Mild vol ol on examination with LE edema   - Would start Lasix 40 mg IV BID  - Please obtain transthoracic echocardiogram to assess ventricular function, wall motion and valvular abnormalities.   - Continue aspirin and statin   - BP labile 80-120s   - Hold home Imdur and losartan to allow room for AV nodals

## 2024-03-17 NOTE — CONSULT NOTE ADULT - ASSESSMENT
53M with PMH of AF on Eliquis, indwelling Aguilera CAD s/p stents, CVA, DM, HTN, osteomyelitis s/p debridement, PE, sent from [facility] for elevated BP, SOB, and dislodged Aguilera, found to be febrile in ER, a/w sepsis and A fib RVR.     Sepsis, A fib RVR, CAD, stents, HTN  - Known A fib, now -150s on admission   - S/p Cardizem 10mg IVP x 1 and then was started on iv Cardizem 10mg/hr gtt.   - S/p digoxin loaded ( digoxin 500 mcg IV + 250 mg PO) due to hypotension and persistent AF with RVR in 140s-150s.   - Would give 250 mg PO x 1 at 1 pm   - Tele with A fib improved rates 110s  - Continue Eliquis for AC, admits compliance    - Pt w/ SOB and LE edema   - ECHO 12/2023 w/ normal LV & RV size and function EF 55-60%, indeterminate DD, mod dilated LA and RA, mod MR, mild TR, PASP 31  - BNP 7622  - Mild vol ol on examination with LE edema   - Creatinine:  <--1.20,  <--1.30  - Would start Lasix 40 mg IV BID  - Please obtain transthoracic echocardiogram to assess ventricular function, wall motion and valvular abnormalities.     - EKG showed A fib RVR @ 154  - Troponin: <-51.5  - No evidence of any active ischemia   - Continue aspirin and statin     - BP labile 80-120s     - Sepsis likely 2/2 UTI  - Management per primary team     - Monitor and replete lytes, keep K>4, Mg>2.  - Will continue to follow.    Danny Arce, MS FNP, AGACNP  Nurse Practitioner- Cardiology   Please call on TEAMS     53M with PMH of AF on Eliquis, indwelling Aguilera CAD s/p stents, CVA, DM, HTN, osteomyelitis s/p debridement, PE, sent from [facility] for elevated BP, SOB, and dislodged Aguilera, found to be febrile in ER, a/w sepsis and A fib RVR.     Sepsis, A fib RVR, CAD, stents, HTN  - Known A fib, now -150s on admission   - On Cardizem 60 mg PO Q6H and Toprol 50 mg at home   - S/p Cardizem 10mg IVP x 1 and then was started on iv Cardizem 10mg/hr gtt.   - S/p digoxin loaded ( digoxin 500 mcg IV + 250 mg PO) due to hypotension and persistent AF with RVR in 140s-150s.   - Tele with A fib improved rates 110s  - Continue digoxin 250 mg PO daily   - Continue Eliquis for AC, admits compliance    - Pt w/ SOB and LE edema   - ECHO 12/2023 w/ normal LV & RV size and function EF 55-60%, indeterminate DD, mod dilated LA and RA, mod MR, mild TR, PASP 31  - BNP 7622  - Mild vol ol on examination with LE edema   - Creatinine:  <--1.20,  <--1.30  - Would start Lasix 40 mg IV BID  - On Lasix 40 mg PO BID at home   - Please obtain transthoracic echocardiogram to assess ventricular function, wall motion and valvular abnormalities.     - EKG showed A fib RVR @ 154  - Troponin: <-51.5  - No evidence of any active ischemia   - Continue aspirin and statin     - BP labile 80-120s   - Hold home Imdur and losartan to allow room for AV nodals     - Sepsis likely 2/2 UTI  - Management per primary team     - Monitor and replete lytes, keep K>4, Mg>2.  - Will continue to follow.    Danny Arce, MS FNP, AGACNP  Nurse Practitioner- Cardiology   Please call on TEAMS     53M with PMH of AF on Eliquis, indwelling Aguilera CAD s/p stents, CVA, DM, HTN, osteomyelitis s/p debridement, PE, sent from [facility] for elevated BP, SOB, and dislodged Aguilera, found to be febrile in ER, a/w sepsis and A fib RVR.     Sepsis, A fib RVR, CAD, stents, HTN  - Known A fib, now -150s on admission   - On Cardizem 60 mg PO Q6H and Toprol 50 mg at home   - S/p Cardizem 10mg IVP x 1 and then was started on iv Cardizem 10mg/hr gtt.   - Resume BB with hold parameters  - S/p digoxin loaded ( digoxin 500 mcg IV + 250 mg PO) due to hypotension and persistent AF with RVR in 140s-150s.   - Tele with A fib improved rates 110s  - Continue digoxin 250 mg PO daily   - Continue Eliquis for AC, admits compliance    - Pt w/ SOB and LE edema   - ECHO 12/2023 w/ normal LV & RV size and function EF 55-60%, indeterminate DD, mod dilated LA and RA, mod MR, mild TR, PASP 31  - BNP 7622  - Mild vol ol on examination with LE edema   - Creatinine:  <--1.20,  <--1.30  - Would start Lasix 40 mg IV BID  - On Lasix 40 mg PO BID at home   - Please obtain transthoracic echocardiogram to assess ventricular function, wall motion and valvular abnormalities.     - EKG showed A fib RVR @ 154  - Troponin: <-51.5  - No evidence of any active ischemia   - Continue aspirin and statin     - BP labile 80-120s   - Hold home Imdur and losartan to allow room for AV nodals     - Sepsis likely 2/2 UTI  - Management per primary team     - Monitor and replete lytes, keep K>4, Mg>2.  - Will continue to follow.    Danny Arce, MS FNP, AGACNP  Nurse Practitioner- Cardiology   Please call on TEAMS

## 2024-03-17 NOTE — H&P ADULT - PROBLEM SELECTOR PLAN 3
Unclear etiology  Oxygen prn  Pulmonary consult  Further work-up/management pending clinical course. Unclear etiology  Nebs  Oxygen prn  Pulmonary consult  Further work-up/management pending clinical course.

## 2024-03-17 NOTE — CONSULT NOTE ADULT - ASSESSMENT
sepsis, CAUTI  reports dysuria  jacques placed in ED    acute hypoxic resp failure    Recommendations  previously had klebsiella pneumoniae bacteremia 2/2 UTI which was resistant to zosyn but sensitive to ceftriaxone (likely 2/2 TEM-1 hyperproduction)  will therefore switch zosyn to ceftriaxone   f/u urine cx  f/u blood culture    Ryan Romeo M.D.  [facility], Division of Infectious Diseases  [phone]  After 5pm on weekdays and all day on weekends - please call [phone]  54 y/o M PMhx AF on Eliquis, chronic jacques, CAD s/p stents, CVA, DM, HTN, PE who presented w/ SOB, and dislodged jacques as well as dysuria.  also found to have Afib w/ RVR     sepsis, CAUTI, dislodged jacques  reports dysuria  jacques placed in ED  CT abd/pelvis- No hydronephrosis or nephrolithiasis.    acute hypoxic resp failure  RVP negative  CTA- w/ atelectasis, no evidence of PE  noted procal, however, no evidence of PNA on CT    Recommendations  previously had klebsiella pneumoniae bacteremia 2/2 UTI which was resistant to zosyn but sensitive to ceftriaxone (likely 2/2 TEM-1 hyperproduction)  will therefore switch zosyn to ceftriaxone   f/u urine cx  f/u blood culture  TTE pending    Ryan Romeo M.D.  [facility], Division of Infectious Diseases  [phone]  After 5pm on weekdays and all day on weekends - please call [phone]  52 y/o M PMhx AF on Eliquis, chronic jacques, CAD s/p stents, CVA, DM, HTN, PE who presented w/ SOB, and dislodged jacques as well as dysuria.  also found to have Afib w/ RVR     sepsis, CAUTI, dislodged jacques  reports dysuria  jacques placed in ED  CT abd/pelvis- No hydronephrosis or nephrolithiasis.    acute hypoxic resp failure, LE edema  RVP negative  CTA- w/ atelectasis, no evidence of PE  noted procal, however, no evidence of PNA on CT    Recommendations  previously had klebsiella pneumoniae bacteremia 2/2 UTI which was resistant to zosyn but sensitive to ceftriaxone (likely 2/2 TEM-1 hyperproduction)  will therefore switch zosyn to ceftriaxone   f/u urine cx  f/u blood culture  TTE pending  diuresis per cardiology    Ryan Romeo M.D.  [facility], Division of Infectious Diseases  [phone]  After 5pm on weekdays and all day on weekends - please call [phone]  52 y/o M PMhx AF on Eliquis, chronic jacques, CAD s/p stents, CVA, DM, HTN, PE who presented w/ SOB, and dislodged jacques as well as dysuria.  also found to have Afib w/ RVR     sepsis, CAUTI, dislodged jacques  reports dysuria  jacques placed in ED  CT abd/pelvis- No hydronephrosis or nephrolithiasis.    acute hypoxic resp failure, LE edema  RVP negative  CTA- w/ atelectasis, no evidence of PE  noted procal, however, no evidence of PNA on CT    Recommendations  previously had klebsiella pneumoniae bacteremia 2/2 UTI which was resistant to zosyn but sensitive to ceftriaxone (likely 2/2 TEM-1 hyperproduction)  will therefore switch zosyn to ceftriaxone   f/u urine cx  f/u blood culture  TTE pending  diuresis per cardiology    Addendum  blood cx PCR w/ ESBL E Coli  will switch to ertapenem 1g daily  repeat blood cx ordered for the AM    Ryan Romeo M.D.  [facility], Division of Infectious Diseases  [phone]  After 5pm on weekdays and all day on weekends - please call [phone]

## 2024-03-17 NOTE — ED ADULT NURSE REASSESSMENT NOTE - NS ED NURSE REASSESS COMMENT FT1
Pt heart rhythm continues in rapid afib, cardizem drip titrated up to 7 ml/hr. Resident called for advisement as heart rhythm does not drop below 145. Per resident continue to titrate drip to manage heart rate, call back if BP drops below 90. care continues

## 2024-03-17 NOTE — CONSULT NOTE ADULT - ASSESSMENT
53M with PMH of AF on Eliquis, indwelling jacques, CAD s/p stents, CVA, DM, HTN, osteomyelitis s/p debridement, PE, sent from [facility] for elevated BP, SOB, and dislodged jacques    AF  Emphysema  Atelectasis  CAD  CVA  DM  HTN  OP  OA  hx of OM  hx of PE    ct neg for pe - atelectasis - emphysema  spiriva - nebs prn - consideration for systemic steroids   VBG  cardio eval in progress  cvs rx regimen  proBNP noted to be elevated  consideration for diuresis - as per cardio recs  TTE reviewed  AF management  rate and rhythm control  GERARDO eval as outpatient  monitored unit admission  pulse ox monitoring  goal sat > 88 pct  DM care  serial FS  OLD records reviewed

## 2024-03-17 NOTE — CONSULT NOTE ADULT - SUBJECTIVE AND OBJECTIVE BOX
Date/Time Patient Seen:  		  Referring MD:   Data Reviewed	       Patient is a 53y old  Male who presents with a chief complaint of SOB, fever (17 Mar 2024 07:39)      Subjective/HPI   This is a 53M with PMH of AF on Eliquis, indwelling jacques, CAD s/p stents, CVA, DM, HTN, osteomyelitis s/p debridement, PE, sent from [facility] for elevated BP, SOB, and dislodged jacques. Patient states he was having difficulty breathing yesterday and that he couldn't catch his breath. Denies feeling feverish, cp, abd pain, vomiting, diarrhea. He was found to be febrile to 103.8, lactate 3.1 --> 3.7. He was also found to be in DEIDRE. He received iv cardizem 10mg ivp x 1 and then was started on iv cardizem 10mg/hr gtt. It was dropped to 5mg/hr overnight due to hypotension. The patient was digoxin loaded due to hypotension and persistent AF with RVR in 140s-150s.  PAST MEDICAL & SURGICAL HISTORY:  No pertinent past medical history    Diabetes    No pertinent past medical history    Diabetes mellitus with no complication    Afib    Hypertension    BPH (benign prostatic hyperplasia)    Perforated gastric ulcer  s/p emergent ex-lap omentopexy and plication 6/2019    Pulmonary embolism    History of non-ST elevation myocardial infarction (NSTEMI)    Osteomyelitis  s/p debridement    CAD S/P percutaneous coronary angioplasty    Cerebrovascular accident    No significant past surgical history    No significant past surgical history    H/O abdominal surgery    Perforated gastric ulcer    Traumatic amputation of left foot, initial encounter    PAST SURGICAL HISTORY:  H/O abdominal surgery     Perforated gastric ulcer     Traumatic amputation of left foot, initial encounter.     FAMILY HISTORY:  FH: coronary artery disease, Father  FH: pulmonary embolism, Mother  FH: stroke, Father.     Social History:  · Substance use	No      Tobacco Screening:  · Core Measure Site	Yes  · Has the patient used tobacco in the past 30 days?	No     Risk Assessment:    Present on Admission:  Deep Venous Thrombosis	no   Pulmonary Embolus	no      HIV Screening:  · In accordance with [place] State law, we offer every patient who comes to our ED an HIV test. Would you like to be tested today?	Unable to answer due to medical condition/unresponsive/etc...         Medication list         MEDICATIONS  (STANDING):  apixaban 5 milliGRAM(s) Oral every 12 hours  aspirin  chewable 81 milliGRAM(s) Oral daily  dextrose 5%. 1000 milliLiter(s) (50 mL/Hr) IV Continuous <Continuous>  dextrose 5%. 1000 milliLiter(s) (100 mL/Hr) IV Continuous <Continuous>  dextrose 50% Injectable 25 Gram(s) IV Push once  dextrose 50% Injectable 12.5 Gram(s) IV Push once  dextrose 50% Injectable 25 Gram(s) IV Push once  digoxin     Tablet 250 MICROGram(s) Oral daily  diltiazem Infusion 10 mG/Hr (10 mL/Hr) IV Continuous <Continuous>  glucagon  Injectable 1 milliGRAM(s) IntraMuscular once  insulin lispro (ADMELOG) corrective regimen sliding scale   SubCutaneous three times a day before meals  insulin lispro (ADMELOG) corrective regimen sliding scale   SubCutaneous at bedtime  methimazole 10 milliGRAM(s) Oral daily  piperacillin/tazobactam IVPB.. 3.375 Gram(s) IV Intermittent every 8 hours  sodium chloride 0.9%. 1000 milliLiter(s) (100 mL/Hr) IV Continuous <Continuous>    MEDICATIONS  (PRN):  acetaminophen     Tablet .. 650 milliGRAM(s) Oral every 6 hours PRN Temp greater or equal to 38C (100.4F), Mild Pain (1 - 3)  aluminum hydroxide/magnesium hydroxide/simethicone Suspension 30 milliLiter(s) Oral every 4 hours PRN Dyspepsia  dextrose Oral Gel 15 Gram(s) Oral once PRN Blood Glucose LESS THAN 70 milliGRAM(s)/deciliter  melatonin 3 milliGRAM(s) Oral at bedtime PRN Insomnia  ondansetron Injectable 4 milliGRAM(s) IV Push every 6 hours PRN Nausea and/or Vomiting         Vitals log        ICU Vital Signs Last 24 Hrs  T(C): 37.1 (17 Mar 2024 07:18), Max: 39.9 (16 Mar 2024 22:56)  T(F): 98.8 (17 Mar 2024 07:18), Max: 103.8 (16 Mar 2024 22:56)  HR: 140 (17 Mar 2024 07:45) (93 - 155)  BP: 120/68 (17 Mar 2024 07:45) (86/60 - 123/65)  BP(mean): --  ABP: --  ABP(mean): --  RR: 18 (17 Mar 2024 07:45) (17 - 20)  SpO2: 97% (17 Mar 2024 07:45) (93% - 99%)    O2 Parameters below as of 17 Mar 2024 07:45  Patient On (Oxygen Delivery Method): nasal cannula  O2 Flow (L/min): 2               Input and Output:  I&O's Detail      Lab Data                        12.0   19.30 )-----------( 188      ( 17 Mar 2024 06:41 )             37.4     03-17    145  |  110<H>  |  14  ----------------------------<  119<H>  4.0   |  26  |  1.20    Ca    8.1<L>      17 Mar 2024 06:41  Mg     1.5     03-17    TPro  6.5  /  Alb  2.5<L>  /  TBili  0.5  /  DBili  x   /  AST  17  /  ALT  19  /  AlkPhos  86  03-17            Review of Systems	  sob  rodas  weakness      Objective     Physical Examination    heart s1s2  lung dc BS  head nc      Pertinent Lab findings & Imaging      Jacques:  NO   Adequate UO     I&O's Detail           Discussed with:     Cultures:	        Radiology      ACC: [id number] EXAM:  CT ABDOMEN AND PELVIS   ORDERED BY: MENG JUAREZ     ACC: [id number] EXAM:  CT ANGIO CHEST PULM ART [facility]   ORDERED BY: MENG JUAREZ     PROCEDURE DATE:  03/17/2024          INTERPRETATION:  CLINICAL INFORMATION: Fever. Chronic Jacques catheter.   UTI. Question stones. Shortness of breath. History of pulmonary embolism    COMPARISON: CT abdomen pelvis 12/20/2023. CT chest 6/16/2021..    CONTRAST/COMPLICATIONS:  IV Contrast: Omnipaque 350 (accession [id number]), NONE (accession   [id number])  70 cc administered   30 cc discarded  Oral Contrast: NONE  Complications: None reported at time of study completion    PROCEDURE:  CT Angiography of the Chest was performed followed by portal venous phase   imaging of the Abdomen and Pelvis.  Sagittal and coronal reformats were performed as well as 3D (MIP)   reconstructions.    FINDINGS:  CHEST:  LUNGS AND LARGE AIRWAYS: Patent central airways. Emphysema. Minimal   dependent atelectatic changes.  PLEURA: No pleural effusion.  VESSELS: No pulmonary embolism. Limited evaluation of the subsegmental   areas.  HEART: Heart size is normal. No pericardial effusion. Coronary   calcifications.  MEDIASTINUM AND ANAYA: No lymphadenopathy.  CHEST WALL AND LOWER NECK: Within normal limits.    ABDOMEN AND PELVIS:  LIVER: Within normal limits.  BILE DUCTS: Normal caliber.  GALLBLADDER: Within normal limits.  SPLEEN: Within normal limits.  PANCREAS: Within normal limits.  ADRENALS: Within normal limits.  KIDNEYS/URETERS: Within normal limits. No hydronephrosis or   nephrolithiasis.    BLADDER: Jacques catheter. No bladder stones.  REPRODUCTIVE ORGANS: Prostate within normal limits.    BOWEL: No bowel obstruction. Appendix is normal.  PERITONEUM: No ascites.  VESSELS: Atherosclerotic changes.  RETROPERITONEUM/LYMPH NODES: No lymphadenopathy.  ABDOMINAL WALL: Fat-containing umbilical hernia. Small fat-containing   left inguinal hernia.  BONES: Degenerative changes. Old left rib fractures.    IMPRESSION:    No pulmonary embolism. Limited evaluation of the subsegmental areas.   Emphysema. Minimal dependent atelectatic changes.    No acute finding in the abdomen and pelvis.      --- End of Report ---            ALEX STANLEY MD; Attending Radiologist  This document has been electronically signed. Mar 17 2024  1:43AM

## 2024-03-17 NOTE — CONSULT NOTE ADULT - SUBJECTIVE AND OBJECTIVE BOX
[facility] - Brittany Lutz, Zahra, Reynaldo, Gera, Román, Francesco; Office Number: [phone]    Initial Consult Note  CHIEF COMPLAINT: Patient is a 53y old  Male who presents with a chief complaint of   HPI: 53M with PMH of AF on Eliquis, indwelling jacques, CAD s/p stents, CVA, DM, HTN, osteomyelitis s/p debridement, PE, sent from [facility] for elevated BP, SOB, and dislodged jacques. Patient states he was having difficulty breathing yesterday and that he couldn't catch his breath. Denies feeling feverish, cp, abd pain, vomiting, diarrhea. He was found to be febrile to 103.8, lactate 3.1 --> 3.7. He was also found to be in DEIDRE. He received iv cardizem 10mg ivp x 1 and then was started on iv cardizem 10mg/hr gtt. It was dropped to 5mg/hr overnight due to hypotension. The patient was digoxin loaded due to hypotension and persistent AF with RVR in 140s-150s.     In ED, T(F): 98.8, HR:  (93 - 155), BP:  (86/60 - 123/65), RR: 18, SpO2:  (93% - 99%). Labs remarkable for ,BNP 7622. Lactate 3.7, Lactate 3.1. EKG showed A fib RVR @ 154. S/p Tylenol for fever, digoxin 500 mcg + 250 mg, Cardizem 10 mg IV x 1 and is now on Cardizem gtt. Follows Dr Linette Rico for cardiology     Allergies    No Known Drug Allergies  fish (Hives)    Intolerances      PAST MEDICAL & SURGICAL HISTORY:  Diabetes      Diabetes mellitus with no complication      Afib      Hypertension      Hypertension      BPH (benign prostatic hyperplasia)      Perforated gastric ulcer  s/p emergent ex-lap omentopexy and plication 6/2019      Pulmonary embolism      History of non-ST elevation myocardial infarction (NSTEMI)      Osteomyelitis  s/p debridement      CAD S/P percutaneous coronary angioplasty      Cerebrovascular accident      H/O abdominal surgery      Perforated gastric ulcer      Traumatic amputation of left foot, initial encounter        MEDICATIONS  (STANDING):  apixaban 5 milliGRAM(s) Oral every 12 hours  aspirin  chewable 81 milliGRAM(s) Oral daily  dextrose 5%. 1000 milliLiter(s) (50 mL/Hr) IV Continuous <Continuous>  dextrose 5%. 1000 milliLiter(s) (100 mL/Hr) IV Continuous <Continuous>  dextrose 50% Injectable 12.5 Gram(s) IV Push once  dextrose 50% Injectable 25 Gram(s) IV Push once  dextrose 50% Injectable 25 Gram(s) IV Push once  digoxin     Tablet 250 MICROGram(s) Oral daily  diltiazem Infusion 5 mG/Hr (5 mL/Hr) IV Continuous <Continuous>  glucagon  Injectable 1 milliGRAM(s) IntraMuscular once  insulin lispro (ADMELOG) corrective regimen sliding scale   SubCutaneous three times a day before meals  insulin lispro (ADMELOG) corrective regimen sliding scale   SubCutaneous at bedtime  methimazole 10 milliGRAM(s) Oral daily  piperacillin/tazobactam IVPB.. 3.375 Gram(s) IV Intermittent every 8 hours  sodium chloride 0.9%. 1000 milliLiter(s) (100 mL/Hr) IV Continuous <Continuous>    MEDICATIONS  (PRN):  acetaminophen     Tablet .. 650 milliGRAM(s) Oral every 6 hours PRN Temp greater or equal to 38C (100.4F), Mild Pain (1 - 3)  aluminum hydroxide/magnesium hydroxide/simethicone Suspension 30 milliLiter(s) Oral every 4 hours PRN Dyspepsia  dextrose Oral Gel 15 Gram(s) Oral once PRN Blood Glucose LESS THAN 70 milliGRAM(s)/deciliter  melatonin 3 milliGRAM(s) Oral at bedtime PRN Insomnia  ondansetron Injectable 4 milliGRAM(s) IV Push every 6 hours PRN Nausea and/or Vomiting    FAMILY HISTORY:  FH: pulmonary embolism  Mother    FH: coronary artery disease  Father    FH: stroke  Father       No family history of acute MI or sudden cardiac death.    SOCIAL HISTORY: No active tobacco, ethanol, or drug abuse.    REVIEW OF SYSTEMS   All other review of systems is negative unless indicated above.    VITAL SIGNS:   Vital Signs Last 24 Hrs  T(C): 37.1 (17 Mar 2024 07:18), Max: 39.9 (16 Mar 2024 22:56)  T(F): 98.8 (17 Mar 2024 07:18), Max: 103.8 (16 Mar 2024 22:56)  HR: 128 (17 Mar 2024 07:18) (93 - 155)  BP: 118/70 (17 Mar 2024 07:18) (86/60 - 123/65)  BP(mean): --  RR: 18 (17 Mar 2024 07:18) (17 - 20)  SpO2: 96% (17 Mar 2024 07:18) (93% - 99%)    Parameters below as of 17 Mar 2024 07:18  Patient On (Oxygen Delivery Method): nasal cannula  O2 Flow (L/min): 2      Physical Exam:  Constitutional: NAD, awake and alert  HEENT: Moist Mucous Membranes, Anicteric  Pulmonary: Non-labored, breath sounds are clear bilaterally, No wheezing, rales or rhonchi  Cardiovascular: Regular, S1 and S2, No murmurs, No rubs, gallops or clicks  Gastrointestinal: Bowel Sounds present, soft, nontender.   Lymph: No peripheral edema. No lymphadenopathy.  Skin: No visible rashes or ulcers.  Psych:  Mood & affect appropriate    I&O's Summary      LABS: All Labs Reviewed:                        12.4   3.90  )-----------( 239      ( 16 Mar 2024 23:00 )             38.9     17 Mar 2024 06:41    145    |  110    |  14     ----------------------------<  119    4.0     |  26     |  1.20   16 Mar 2024 23:00    144    |  108    |  14     ----------------------------<  115    4.8     |  27     |  1.30     Ca    8.1        17 Mar 2024 06:41  Ca    9.1        16 Mar 2024 23:00  Mg     1.5       17 Mar 2024 06:41    TPro  6.5    /  Alb  2.5    /  TBili  0.5    /  DBili  x      /  AST  17     /  ALT  19     /  AlkPhos  86     17 Mar 2024 06:41  TPro  7.6    /  Alb  2.8    /  TBili  0.6    /  DBili  x      /  AST  38     /  ALT  20     /  AlkPhos  103    16 Mar 2024 23:00    PT/INR - ( 16 Mar 2024 23:00 )   PT: 15.2 sec;   INR: 1.31 ratio         PTT - ( 16 Mar 2024 23:00 )  PTT:29.8 secTroponin I, High Sensitivity Result: 51.5 ng/L (03-16-24 @ 23:00)  Thyroid Stimulating Hormone, Serum: 1.75 uIU/mL (03-17-24 @ 06:41)      12 Lead ECG:   Ventricular Rate 154 BPM    QRS Duration 76 ms    Q-T Interval 282 ms    QTC Calculation(Bazett) 451 ms    R Axis 15 degrees    T Axis 194 degrees    Diagnosis Line *** Critical Test Result: High HR  Atrial fibrillation with rapid ventricular response  Low voltage QRS  Nonspecific ST and T wave abnormality    Confirmed by JOHN KU ([id number]) on 3/17/2024 7:30:13 AM (03-16-24 @ 23:50)      TRANSTHORACIC ECHOCARDIOGRAM REPORT  ________________________________________________________________________________                                      _______       Pt. Name:       SARAH MORRISON Study Date:    12/23/2023  MRN:            [id number]         YOB: 1970  Accession #:    [id number]        Age:           53 years  Account#:       [id number]       Gender:        M  Heart Rate:                      Height:        74.02 in (188.00 cm)  Rhythm:Weight:        255.73 lb (116.00 kg)  Blood Pressure: 102/58 mmHg      BSA/BMI:       2.41 m² / 32.82 kg/m²  ________________________________________________________________________________________  Referring Physician:    [id number] Hill Brizuela  Interpreting Physician: Jeovany France  Primary Sonographer:    Heidi Redd RDCS    CPT:               ECHO TTE WO CON COMP W DOPP - 05491.m  Indication(s):     Abnormal electrocardiogram ECG/EKG - R94.31  Procedure:         Transthoracic echocardiogram with 2-D, M-mode and complete                     spectral and color flow Doppler.  Ordering Location: [facility]    _______________________________________________________________________________________     CONCLUSIONS:      1. Left ventricular systolic function is normal with an ejection fraction visually estimated at 55 to 60 %.   2. Moderate left ventricular hypertrophy.   3. The left ventricular diastolic function is indeterminate.   4. Normal right ventricular cavity size, wall thickness, and systolic function.   5. The left atrium is moderately dilated.   6. The right atrium is moderately dilated in size.   7. There is calcification of the mitral valve annulus.   8. Moderate mitral regurgitation.   9. Calcification of the aortic valve leaflets.  10. Mild tricuspid regurgitation.  11. Estimated pulmonary artery systolic pressure is 31 mmHg, consistent with normal pulmonary artery pressure.  12. Trace pericardial effusion.    ________________________________________________________________________________________  FINDINGS:     Left Ventricle:  Left ventricular systolic function is normal with an ejection fraction visually estimated at 55 to 60%. There are no regional wall motion abnormalities seen. The left ventricular diastolicfunction is indeterminate. Moderate left ventricular hypertrophy.     Right Ventricle:  The right ventricular cavity is normal in size, normal wall thickness and normal systolic function.     Left Atrium:  The left atrium is moderately dilated with an indexed volume of 47.25 ml/m².     Right Atrium:  The right atrium is moderately dilated in size with an indexed volume of 35.48 ml/m².     Aortic Valve:  The aortic valve appears trileaflet. There is calcification of the aortic valve leaflets. There is no aortic valve stenosis. There is no evidence of aortic regurgitation.     Mitral Valve:  There is mitral valve thickening of the anterior and posterior leaflets. There is calcification of the mitral valve annulus. There is moderate mitral regurgitation.     Tricuspid Valve:  Structurally normal tricuspid valve with normal leaflet excursion. There is mild tricuspid regurgitation. Estimated pulmonary artery systolic pressure is 31 mmHg, consistent with normal pulmonary artery pressure.     Pulmonic Valve:  Structurally normal pulmonic valve with normal leaflet excursion.     Aorta:  The aortic root at the sinuses of Valsalva is normal in size, measuring 3.60 cm (indexed 1.49 cm/m²). The aortic arch diameter is normal in size, measuring 2.3 cm (indexed 0.95 cm/m²).     Pericardium:  There is a trace pericardial effusion.     Systemic Veins:  The inferior vena cava is normal in size measuring 1.91 cm in diameter, (normal <2.1cm) with normal inspiratory collapse (normal >50%) consistent with normal right atrial pressure (~3, range 0-5mmHg).  ____________________________________________________________________  QUANTITATIVE DATA:  Left Ventricle Measurements: (Indexed to BSA)     IVSd (2D):   1.4 cm  LVPWd (2D):  1.4 cm  LVIDd (2D):  4.8 cm  LVIDs (2D):  3.3 cm  LV Mass:     283 g  117.1 g/m²  Visualized LV EF%: 55 to 60%     MV E Vmax:    1.49 m/s  e' lateral:   11.90 cm/s  e' medial:    8.38 cm/s  E/e' lateral: 12.52  E/e' medial:  17.78  E/e' Average: 14.69  MV DT:        102 msec    Aorta Measurements: (normal range) (Indexed to BSA)     Sinuses of Valsalva: 3.60 cm (3.1 - 3.7 cm)  Ao Arch:             2.3 cm       Left Atrium Measurements: (Indexed to BSA)  LA Diam 2D: 5.00 cm    Right Atrial Measurements:     RA Vol:  85.60 ml  RA Vol Index: 35.48 ml/m²    Mitral Valve Measurements:     MV E Vmax: 1.5 m/s         MR Vmax:          4.64 m/s                             MR Peak Gradient: 86.1 mmHg       Tricuspid Valve Measurements:     TR Vmax:          2.7 m/s  TR Peak Gradient: 28.3 mmHg  RA Pressure:      3 mmHg  PASP:             31 mmHg    ________________________________________________________________________________________  Electronically signed on 12/23/2023 at 3:54:22 PM by Jeovany France         *** Final ***   [facility] - Brittany Lutz, Zahra, Reynaldo, Gera, Román, Francesco; Office Number: [phone]    Initial Consult Note  CHIEF COMPLAINT: Patient is a 53y old  Male who presents with a chief complaint of SOB, fever    HPI: 53M with PMH of AF on Eliquis, indwelling Aguilera CAD s/p stents, CVA, DM, HTN, osteomyelitis s/p debridement, PE, sent from [facility] for elevated BP, SOB, and dislodged Aguilera Patient states he was having difficulty breathing yesterday and that he couldn't catch his breath. He was found to be febrile to 103.8, lactate 3.1 --> 3.7. He was also found to be in DEIDRE. He received iv Cardizem 10mg ivp x 1 and then was started on iv Cardizem 10mg/hr gtt. The patient was digoxin loaded ( digoxin 500 mcg + 250 mg) due to hypotension and persistent AF with RVR in 140s-150s.     In ED, T(F): 98.8, HR:  (93 - 155), BP:  (86/60 - 123/65), RR: 18, SpO2:  (93% - 99%). Labs remarkable for ,BNP 7622. Lactate 3.7, Lactate 3.1. EKG showed A fib RVR @ 154. S/p Tylenol for fever.     Allergies  No Known Drug Allergies    fish (Hives)      PAST MEDICAL & SURGICAL HISTORY:  Diabetes      Diabetes mellitus with no complication      Afib      Hypertension      Hypertension      BPH (benign prostatic hyperplasia)      Perforated gastric ulcer  s/p emergent ex-lap omentopexy and plication 6/2019      Pulmonary embolism      History of non-ST elevation myocardial infarction (NSTEMI)      Osteomyelitis  s/p debridement      CAD S/P percutaneous coronary angioplasty      Cerebrovascular accident      H/O abdominal surgery      Perforated gastric ulcer      Traumatic amputation of left foot, initial encounter        MEDICATIONS  (STANDING):  apixaban 5 milliGRAM(s) Oral every 12 hours  aspirin  chewable 81 milliGRAM(s) Oral daily  dextrose 5%. 1000 milliLiter(s) (50 mL/Hr) IV Continuous <Continuous>  dextrose 5%. 1000 milliLiter(s) (100 mL/Hr) IV Continuous <Continuous>  dextrose 50% Injectable 12.5 Gram(s) IV Push once  dextrose 50% Injectable 25 Gram(s) IV Push once  dextrose 50% Injectable 25 Gram(s) IV Push once  digoxin     Tablet 250 MICROGram(s) Oral daily  diltiazem Infusion 5 mG/Hr (5 mL/Hr) IV Continuous <Continuous>  glucagon  Injectable 1 milliGRAM(s) IntraMuscular once  insulin lispro (ADMELOG) corrective regimen sliding scale   SubCutaneous three times a day before meals  insulin lispro (ADMELOG) corrective regimen sliding scale   SubCutaneous at bedtime  methimazole 10 milliGRAM(s) Oral daily  piperacillin/tazobactam IVPB.. 3.375 Gram(s) IV Intermittent every 8 hours  sodium chloride 0.9%. 1000 milliLiter(s) (100 mL/Hr) IV Continuous <Continuous>    MEDICATIONS  (PRN):  acetaminophen     Tablet .. 650 milliGRAM(s) Oral every 6 hours PRN Temp greater or equal to 38C (100.4F), Mild Pain (1 - 3)  aluminum hydroxide/magnesium hydroxide/simethicone Suspension 30 milliLiter(s) Oral every 4 hours PRN Dyspepsia  dextrose Oral Gel 15 Gram(s) Oral once PRN Blood Glucose LESS THAN 70 milliGRAM(s)/deciliter  melatonin 3 milliGRAM(s) Oral at bedtime PRN Insomnia  ondansetron Injectable 4 milliGRAM(s) IV Push every 6 hours PRN Nausea and/or Vomiting    FAMILY HISTORY:  FH: pulmonary embolism  Mother    FH: coronary artery disease  Father    FH: stroke  Father       No family history of acute MI or sudden cardiac death.    SOCIAL HISTORY: No active tobacco, ethanol, or drug abuse.    REVIEW OF SYSTEMS   All other review of systems is negative unless indicated above.    VITAL SIGNS:   Vital Signs Last 24 Hrs  T(C): 37.1 (17 Mar 2024 07:18), Max: 39.9 (16 Mar 2024 22:56)  T(F): 98.8 (17 Mar 2024 07:18), Max: 103.8 (16 Mar 2024 22:56)  HR: 128 (17 Mar 2024 07:18) (93 - 155)  BP: 118/70 (17 Mar 2024 07:18) (86/60 - 123/65)  BP(mean): --  RR: 18 (17 Mar 2024 07:18) (17 - 20)  SpO2: 96% (17 Mar 2024 07:18) (93% - 99%)    Parameters below as of 17 Mar 2024 07:18  Patient On (Oxygen Delivery Method): nasal cannula  O2 Flow (L/min): 2      Physical Exam:  Constitutional: NAD, awake and alert  HEENT: Moist Mucous Membranes, Anicteric  Pulmonary: Non-labored, breath sounds are clear bilaterally, No wheezing, rales or rhonchi  Cardiovascular: Regular, S1 and S2, No murmurs, No rubs, gallops or clicks  Gastrointestinal: Bowel Sounds present, soft, nontender.   Lymph: No peripheral edema. No lymphadenopathy.  Skin: No visible rashes or ulcers.  Psych:  Mood & affect appropriate    I&O's Summary      LABS: All Labs Reviewed:                        12.4   3.90  )-----------( 239      ( 16 Mar 2024 23:00 )             38.9     17 Mar 2024 06:41    145    |  110    |  14     ----------------------------<  119    4.0     |  26     |  1.20   16 Mar 2024 23:00    144    |  108    |  14     ----------------------------<  115    4.8     |  27     |  1.30     Ca    8.1        17 Mar 2024 06:41  Ca    9.1        16 Mar 2024 23:00  Mg     1.5       17 Mar 2024 06:41    TPro  6.5    /  Alb  2.5    /  TBili  0.5    /  DBili  x      /  AST  17     /  ALT  19     /  AlkPhos  86     17 Mar 2024 06:41  TPro  7.6    /  Alb  2.8    /  TBili  0.6    /  DBili  x      /  AST  38     /  ALT  20     /  AlkPhos  103    16 Mar 2024 23:00    PT/INR - ( 16 Mar 2024 23:00 )   PT: 15.2 sec;   INR: 1.31 ratio         PTT - ( 16 Mar 2024 23:00 )  PTT:29.8 secTroponin I, High Sensitivity Result: 51.5 ng/L (03-16-24 @ 23:00)  Thyroid Stimulating Hormone, Serum: 1.75 uIU/mL (03-17-24 @ 06:41)      12 Lead ECG:   Ventricular Rate 154 BPM    QRS Duration 76 ms    Q-T Interval 282 ms    QTC Calculation(Bazett) 451 ms    R Axis 15 degrees    T Axis 194 degrees    Diagnosis Line *** Critical Test Result: High HR  Atrial fibrillation with rapid ventricular response  Low voltage QRS  Nonspecific ST and T wave abnormality    Confirmed by JOHN KU ([id number]) on 3/17/2024 7:30:13 AM (03-16-24 @ 23:50)      TRANSTHORACIC ECHOCARDIOGRAM REPORT  ________________________________________________________________________________                                      _______       Pt. Name:       SARAH MORRISON Study Date:    12/23/2023  MRN:            [id number]         YOB: 1970  Accession #:    [id number]        Age:           53 years  Account#:       [id number]       Gender:        M  Heart Rate:                      Height:        74.02 in (188.00 cm)  Rhythm:Weight:        255.73 lb (116.00 kg)  Blood Pressure: 102/58 mmHg      BSA/BMI:       2.41 m² / 32.82 kg/m²  ________________________________________________________________________________________  Referring Physician:    [id number] Hill Brizuela  Interpreting Physician: Jeovany France  Primary Sonographer:    Heidi Redd LIS    CPT:               ECHO TTE WO CON COMP W DOPP - 70384.m  Indication(s):     Abnormal electrocardiogram ECG/EKG - R94.31  Procedure:         Transthoracic echocardiogram with 2-D, M-mode and complete                     spectral and color flow Doppler.  Ordering Location: [facility]    _______________________________________________________________________________________     CONCLUSIONS:      1. Left ventricular systolic function is normal with an ejection fraction visually estimated at 55 to 60 %.   2. Moderate left ventricular hypertrophy.   3. The left ventricular diastolic function is indeterminate.   4. Normal right ventricular cavity size, wall thickness, and systolic function.   5. The left atrium is moderately dilated.   6. The right atrium is moderately dilated in size.   7. There is calcification of the mitral valve annulus.   8. Moderate mitral regurgitation.   9. Calcification of the aortic valve leaflets.  10. Mild tricuspid regurgitation.  11. Estimated pulmonary artery systolic pressure is 31 mmHg, consistent with normal pulmonary artery pressure.  12. Trace pericardial effusion.    ________________________________________________________________________________________  FINDINGS:     Left Ventricle:  Left ventricular systolic function is normal with an ejection fraction visually estimated at 55 to 60%. There are no regional wall motion abnormalities seen. The left ventricular diastolicfunction is indeterminate. Moderate left ventricular hypertrophy.     Right Ventricle:  The right ventricular cavity is normal in size, normal wall thickness and normal systolic function.     Left Atrium:  The left atrium is moderately dilated with an indexed volume of 47.25 ml/m².     Right Atrium:  The right atrium is moderately dilated in size with an indexed volume of 35.48 ml/m².     Aortic Valve:  The aortic valve appears trileaflet. There is calcification of the aortic valve leaflets. There is no aortic valve stenosis. There is no evidence of aortic regurgitation.     Mitral Valve:  There is mitral valve thickening of the anterior and posterior leaflets. There is calcification of the mitral valve annulus. There is moderate mitral regurgitation.     Tricuspid Valve:  Structurally normal tricuspid valve with normal leaflet excursion. There is mild tricuspid regurgitation. Estimated pulmonary artery systolic pressure is 31 mmHg, consistent with normal pulmonary artery pressure.     Pulmonic Valve:  Structurally normal pulmonic valve with normal leaflet excursion.     Aorta:  The aortic root at the sinuses of Valsalva is normal in size, measuring 3.60 cm (indexed 1.49 cm/m²). The aortic arch diameter is normal in size, measuring 2.3 cm (indexed 0.95 cm/m²).     Pericardium:  There is a trace pericardial effusion.     Systemic Veins:  The inferior vena cava is normal in size measuring 1.91 cm in diameter, (normal <2.1cm) with normal inspiratory collapse (normal >50%) consistent with normal right atrial pressure (~3, range 0-5mmHg).  ____________________________________________________________________  QUANTITATIVE DATA:  Left Ventricle Measurements: (Indexed to BSA)     IVSd (2D):   1.4 cm  LVPWd (2D):  1.4 cm  LVIDd (2D):  4.8 cm  LVIDs (2D):  3.3 cm  LV Mass:     283 g  117.1 g/m²  Visualized LV EF%: 55 to 60%     MV E Vmax:    1.49 m/s  e' lateral:   11.90 cm/s  e' medial:    8.38 cm/s  E/e' lateral: 12.52  E/e' medial:  17.78  E/e' Average: 14.69  MV DT:        102 msec    Aorta Measurements: (normal range) (Indexed to BSA)     Sinuses of Valsalva: 3.60 cm (3.1 - 3.7 cm)  Ao Arch:             2.3 cm       Left Atrium Measurements: (Indexed to BSA)  LA Diam 2D: 5.00 cm    Right Atrial Measurements:     RA Vol:  85.60 ml  RA Vol Index: 35.48 ml/m²    Mitral Valve Measurements:     MV E Vmax: 1.5 m/s         MR Vmax:          4.64 m/s                             MR Peak Gradient: 86.1 mmHg       Tricuspid Valve Measurements:     TR Vmax:          2.7 m/s  TR Peak Gradient: 28.3 mmHg  RA Pressure:      3 mmHg  PASP:             31 mmHg    ________________________________________________________________________________________  Electronically signed on 12/23/2023 at 3:54:22 PM by Jeovany France         *** Final ***

## 2024-03-17 NOTE — CONSULT NOTE ADULT - SUBJECTIVE AND OBJECTIVE BOX
[facility], Division of Infectious Diseases  DELGADO Rodriguez S. Shah, Y. Patel, G. Ousmane  [phone]  ([phone] - weekdays after 5pm and weekends)    SARAH MORRISON  53y, Male  [id number]    HPI--  HPI:  54 y/o M with PMH of AF on Eliquis, indwelling jacques, CAD s/p stents, CVA, DM, HTN, osteomyelitis s/p debridement, PE, sent from [facility] for elevated BP, SOB, and dislodged jacques. Patient states he was having difficulty breathing yesterday and that he couldn't catch his breath. Denies feeling feverish, cp, abd pain, vomiting, diarrhea. He was found to be febrile to 103.8, lactate 3.1 --> 3.7. He was also found to be in DEIDRE. He received iv cardizem 10mg ivp x 1 and then was started on iv cardizem 10mg/hr gtt. It was dropped to 5mg/hr overnight due to hypotension. The patient was digoxin loaded due to hypotension and persistent AF with RVR in 140s-150s. (17 Mar 2024 06:22)  He reports developing dysuria since 3/14.     ROS: 10 point review of systems completed, pertinent positives and negatives as per HPI.    Allergies: No Known Drug Allergies  fish (Hives)    PMH -- No pertinent past medical history    Diabetes    No pertinent past medical history    Diabetes mellitus with no complication    Afib    Hypertension    BPH (benign prostatic hyperplasia)    Perforated gastric ulcer    Pulmonary embolism    History of non-ST elevation myocardial infarction (NSTEMI)    Osteomyelitis    CAD S/P percutaneous coronary angioplasty    Cerebrovascular accident      PSH -- No significant past surgical history    No significant past surgical history    H/O abdominal surgery    Perforated gastric ulcer    Traumatic amputation of left foot, initial encounter      FH -- No pertinent family history in first degree relatives    No pertinent family history in first degree relatives    FH: pulmonary embolism    FH: coronary artery disease    FH: stroke      Social History -- former     Physical Exam--  Vital Signs Last 24 Hrs  T(F): 99.4 (17 Mar 2024 12:23), Max: 103.8 (16 Mar 2024 22:56)  HR: 105 (17 Mar 2024 12:23) (93 - 155)  BP: 121/79 (17 Mar 2024 12:23) (86/60 - 123/65)  RR: 19 (17 Mar 2024 12:23) (17 - 20)  SpO2: 93% (17 Mar 2024 12:23) (93% - 99%)  General: nontoxic-appearing, no acute distress  HEENT: anicteric, NC  Lungs: Clear bilaterally without rales  Heart: S1, S2, normal rate.   Abdomen: Soft. Nondistended. Nontender. no suprapubic tenderness  Neuro: AAOx3  Back: No costovertebral angle tenderness.  Extremities: mild LE edema.   Skin: Warm. Dry.   Psychiatric: Appropriate affect and mood for situation.     Laboratory & Imaging Data--  CBC:                       12.0   19.30 )-----------( 188      ( 17 Mar 2024 06:41 )             37.4     WBC Count: 19.30 K/uL (03-17-24 @ 06:41)  WBC Count: 3.90 K/uL (03-16-24 @ 23:00)    CMP: 03-17    145  |  110<H>  |  14  ----------------------------<  119<H>  4.0   |  26  |  1.20    Ca    8.1<L>      17 Mar 2024 06:41  Mg     1.5     03-17    TPro  6.5  /  Alb  2.5<L>  /  TBili  0.5  /  DBili  x   /  AST  17  /  ALT  19  /  AlkPhos  86  03-17    LIVER FUNCTIONS - ( 17 Mar 2024 06:41 )  Alb: 2.5 g/dL / Pro: 6.5 g/dL / ALK PHOS: 86 U/L / ALT: 19 U/L / AST: 17 U/L / GGT: x           Urinalysis Basic - ( 17 Mar 2024 06:41 )    Color: x / Appearance: x / SG: x / pH: x  Gluc: 119 mg/dL / Ketone: x  / Bili: x / Urobili: x   Blood: x / Protein: x / Nitrite: x   Leuk Esterase: x / RBC: x / WBC x   Sq Epi: x / Non Sq Epi: x / Bacteria: x      Microbiology:       Radiology--  ***  Active Medications--  acetaminophen     Tablet .. 650 milliGRAM(s) Oral every 6 hours PRN  albuterol    0.083% 2.5 milliGRAM(s) Nebulizer every 4 hours PRN  aluminum hydroxide/magnesium hydroxide/simethicone Suspension 30 milliLiter(s) Oral every 4 hours PRN  apixaban 5 milliGRAM(s) Oral every 12 hours  ascorbic acid 500 milliGRAM(s) Oral daily  aspirin  chewable 81 milliGRAM(s) Oral daily  atorvastatin 40 milliGRAM(s) Oral at bedtime  bisacodyl 10 milliGRAM(s) Oral daily  cholecalciferol 1000 Unit(s) Oral daily  dextrose 5%. 1000 milliLiter(s) IV Continuous <Continuous>  dextrose 5%. 1000 milliLiter(s) IV Continuous <Continuous>  dextrose 50% Injectable 12.5 Gram(s) IV Push once  dextrose 50% Injectable 25 Gram(s) IV Push once  dextrose 50% Injectable 25 Gram(s) IV Push once  dextrose Oral Gel 15 Gram(s) Oral once PRN  digoxin     Tablet 250 MICROGram(s) Oral daily  diltiazem Infusion 10 mG/Hr IV Continuous <Continuous>  ferrous    sulfate 325 milliGRAM(s) Oral two times a day  gabapentin 300 milliGRAM(s) Oral every 12 hours  glucagon  Injectable 1 milliGRAM(s) IntraMuscular once  insulin glargine Injectable (LANTUS) 15 Unit(s) SubCutaneous every morning  insulin glargine Injectable (LANTUS) 7 Unit(s) SubCutaneous at bedtime  insulin lispro (ADMELOG) corrective regimen sliding scale   SubCutaneous three times a day before meals  insulin lispro (ADMELOG) corrective regimen sliding scale   SubCutaneous at bedtime  loratadine 10 milliGRAM(s) Oral daily  melatonin 5 milliGRAM(s) Oral at bedtime  metoclopramide 5 milliGRAM(s) Oral three times a day  metoprolol tartrate 25 milliGRAM(s) Oral two times a day  multivitamin/minerals 1 Tablet(s) Oral daily  naloxegol 25 milliGRAM(s) Oral daily  ondansetron Injectable 4 milliGRAM(s) IV Push every 6 hours PRN  oxybutynin 5 milliGRAM(s) Oral two times a day  oxyCODONE    IR 10 milliGRAM(s) Oral two times a day  pantoprazole    Tablet 40 milliGRAM(s) Oral before breakfast  piperacillin/tazobactam IVPB.. 3.375 Gram(s) IV Intermittent every 8 hours  polyethylene glycol 3350 17 Gram(s) Oral two times a day  saccharomyces boulardii 250 milliGRAM(s) Oral two times a day  senna 2 Tablet(s) Oral at bedtime  sodium chloride 0.65% Nasal 1 Spray(s) Both Nostrils daily  sodium chloride 0.9%. 1000 milliLiter(s) IV Continuous <Continuous>  sucralfate 1 Gram(s) Oral two times a day  tiotropium 2.5 MICROgram(s) Inhaler 2 Puff(s) Inhalation daily    Antimicrobials:   piperacillin/tazobactam IVPB.. 3.375 Gram(s) IV Intermittent every 8 hours    Immunologic:    [facility], Division of Infectious Diseases  DELGADO Rodriguez S. Shah, Y. Patel, G. Ousmane  [phone]  ([phone] - weekdays after 5pm and weekends)    SARAH MORRISON  53y, Male  [id number]    HPI--  HPI:  52 y/o M PMhx AF on Eliquis, chronic jacques, CAD s/p stents, CVA, DM, HTN, PE who presented w/ elevated BP, SOB, and dislodged jacques. He reports developing dysuria since 3/14. Reports having SOB a few days ago   Was started on cardizem for Afib w/ RVR.   Denies fever, chest pain, abd pain, n/v, diarrhea, flank pain.    ROS: 10 point review of systems completed, pertinent positives and negatives as per HPI.    Allergies: No Known Drug Allergies  fish (Hives)    PMH -- No pertinent past medical history    Diabetes    No pertinent past medical history    Diabetes mellitus with no complication    Afib    Hypertension    BPH (benign prostatic hyperplasia)    Perforated gastric ulcer    Pulmonary embolism    History of non-ST elevation myocardial infarction (NSTEMI)    Osteomyelitis    CAD S/P percutaneous coronary angioplasty    Cerebrovascular accident      PSH -- No significant past surgical history    No significant past surgical history    H/O abdominal surgery    Perforated gastric ulcer    Traumatic amputation of left foot, initial encounter      FH -- No pertinent family history in first degree relatives    No pertinent family history in first degree relatives    FH: pulmonary embolism    FH: coronary artery disease    FH: stroke      Social History -- former     Physical Exam--  Vital Signs Last 24 Hrs  T(F): 99.4 (17 Mar 2024 12:23), Max: 103.8 (16 Mar 2024 22:56)  HR: 105 (17 Mar 2024 12:23) (93 - 155)  BP: 121/79 (17 Mar 2024 12:23) (86/60 - 123/65)  RR: 19 (17 Mar 2024 12:23) (17 - 20)  SpO2: 93% (17 Mar 2024 12:23) (93% - 99%)  General: nontoxic-appearing, no acute distress  HEENT: anicteric, NC  Lungs: Clear bilaterally without rales  Heart: S1, S2, normal rate.   Abdomen: Soft. Nondistended. Nontender. no suprapubic tenderness  Neuro: AAOx3  Back: No costovertebral angle tenderness.  Extremities: mild LE edema.   Skin: Warm. Dry.   Psychiatric: Appropriate affect and mood for situation.     Laboratory & Imaging Data--  CBC:                       12.0   19.30 )-----------( 188      ( 17 Mar 2024 06:41 )             37.4     WBC Count: 19.30 K/uL (03-17-24 @ 06:41)  WBC Count: 3.90 K/uL (03-16-24 @ 23:00)    CMP: 03-17    145  |  110<H>  |  14  ----------------------------<  119<H>  4.0   |  26  |  1.20    Ca    8.1<L>      17 Mar 2024 06:41  Mg     1.5     03-17    TPro  6.5  /  Alb  2.5<L>  /  TBili  0.5  /  DBili  x   /  AST  17  /  ALT  19  /  AlkPhos  86  03-17    LIVER FUNCTIONS - ( 17 Mar 2024 06:41 )  Alb: 2.5 g/dL / Pro: 6.5 g/dL / ALK PHOS: 86 U/L / ALT: 19 U/L / AST: 17 U/L / GGT: x           Urinalysis Basic - ( 17 Mar 2024 06:41 )    Color: x / Appearance: x / SG: x / pH: x  Gluc: 119 mg/dL / Ketone: x  / Bili: x / Urobili: x   Blood: x / Protein: x / Nitrite: x   Leuk Esterase: x / RBC: x / WBC x   Sq Epi: x / Non Sq Epi: x / Bacteria: x      Microbiology:       Radiology--  ***  Active Medications--  acetaminophen     Tablet .. 650 milliGRAM(s) Oral every 6 hours PRN  albuterol    0.083% 2.5 milliGRAM(s) Nebulizer every 4 hours PRN  aluminum hydroxide/magnesium hydroxide/simethicone Suspension 30 milliLiter(s) Oral every 4 hours PRN  apixaban 5 milliGRAM(s) Oral every 12 hours  ascorbic acid 500 milliGRAM(s) Oral daily  aspirin  chewable 81 milliGRAM(s) Oral daily  atorvastatin 40 milliGRAM(s) Oral at bedtime  bisacodyl 10 milliGRAM(s) Oral daily  cholecalciferol 1000 Unit(s) Oral daily  dextrose 5%. 1000 milliLiter(s) IV Continuous <Continuous>  dextrose 5%. 1000 milliLiter(s) IV Continuous <Continuous>  dextrose 50% Injectable 12.5 Gram(s) IV Push once  dextrose 50% Injectable 25 Gram(s) IV Push once  dextrose 50% Injectable 25 Gram(s) IV Push once  dextrose Oral Gel 15 Gram(s) Oral once PRN  digoxin     Tablet 250 MICROGram(s) Oral daily  diltiazem Infusion 10 mG/Hr IV Continuous <Continuous>  ferrous    sulfate 325 milliGRAM(s) Oral two times a day  gabapentin 300 milliGRAM(s) Oral every 12 hours  glucagon  Injectable 1 milliGRAM(s) IntraMuscular once  insulin glargine Injectable (LANTUS) 15 Unit(s) SubCutaneous every morning  insulin glargine Injectable (LANTUS) 7 Unit(s) SubCutaneous at bedtime  insulin lispro (ADMELOG) corrective regimen sliding scale   SubCutaneous three times a day before meals  insulin lispro (ADMELOG) corrective regimen sliding scale   SubCutaneous at bedtime  loratadine 10 milliGRAM(s) Oral daily  melatonin 5 milliGRAM(s) Oral at bedtime  metoclopramide 5 milliGRAM(s) Oral three times a day  metoprolol tartrate 25 milliGRAM(s) Oral two times a day  multivitamin/minerals 1 Tablet(s) Oral daily  naloxegol 25 milliGRAM(s) Oral daily  ondansetron Injectable 4 milliGRAM(s) IV Push every 6 hours PRN  oxybutynin 5 milliGRAM(s) Oral two times a day  oxyCODONE    IR 10 milliGRAM(s) Oral two times a day  pantoprazole    Tablet 40 milliGRAM(s) Oral before breakfast  piperacillin/tazobactam IVPB.. 3.375 Gram(s) IV Intermittent every 8 hours  polyethylene glycol 3350 17 Gram(s) Oral two times a day  saccharomyces boulardii 250 milliGRAM(s) Oral two times a day  senna 2 Tablet(s) Oral at bedtime  sodium chloride 0.65% Nasal 1 Spray(s) Both Nostrils daily  sodium chloride 0.9%. 1000 milliLiter(s) IV Continuous <Continuous>  sucralfate 1 Gram(s) Oral two times a day  tiotropium 2.5 MICROgram(s) Inhaler 2 Puff(s) Inhalation daily    Antimicrobials:   piperacillin/tazobactam IVPB.. 3.375 Gram(s) IV Intermittent every 8 hours    Immunologic:    [facility], Division of Infectious Diseases  DELGADO Rodriguez S. Shah, Y. Patel, G. Ousmane  [phone]  ([phone] - weekdays after 5pm and weekends)    SARAH MORRISON  53y, Male  [id number]    HPI--  HPI:  52 y/o M PMhx AF on Eliquis, chronic jacques, CAD s/p stents, CVA, DM, HTN, PE who presented w/ elevated BP, SOB, and dislodged jacques. He reports developing dysuria since 3/14. Next day reports having chills, and SOB as well. In ED was found to be febrile to 103.8. Was started on cardizem for Afib w/ RVR.   Denies fever, chest pain, abd pain, n/v, diarrhea, flank pain.    ROS: 10 point review of systems completed, pertinent positives and negatives as per HPI.    Allergies: No Known Drug Allergies  fish (Hives)    PMH -- No pertinent past medical history    Diabetes    No pertinent past medical history    Diabetes mellitus with no complication    Afib    Hypertension    BPH (benign prostatic hyperplasia)    Perforated gastric ulcer    Pulmonary embolism    History of non-ST elevation myocardial infarction (NSTEMI)    Osteomyelitis    CAD S/P percutaneous coronary angioplasty    Cerebrovascular accident      PSH -- No significant past surgical history    No significant past surgical history    H/O abdominal surgery    Perforated gastric ulcer    Traumatic amputation of left foot, initial encounter      FH -- No pertinent family history in first degree relatives    No pertinent family history in first degree relatives    FH: pulmonary embolism    FH: coronary artery disease    FH: stroke      Social History -- former     Physical Exam--  Vital Signs Last 24 Hrs  T(F): 99.4 (17 Mar 2024 12:23), Max: 103.8 (16 Mar 2024 22:56)  HR: 105 (17 Mar 2024 12:23) (93 - 155)  BP: 121/79 (17 Mar 2024 12:23) (86/60 - 123/65)  RR: 19 (17 Mar 2024 12:23) (17 - 20)  SpO2: 93% (17 Mar 2024 12:23) (93% - 99%)  General: nontoxic-appearing, no acute distress  HEENT: anicteric, NC  Lungs: Clear bilaterally without rales  Heart: S1, S2, normal rate.   Abdomen: Soft. Nondistended. Nontender. no suprapubic tenderness  Neuro: AAOx3  Back: No costovertebral angle tenderness.  Extremities: mild LE edema.   Skin: Warm. Dry.   Psychiatric: Appropriate affect and mood for situation.     Laboratory & Imaging Data--  CBC:                       12.0   19.30 )-----------( 188      ( 17 Mar 2024 06:41 )             37.4     WBC Count: 19.30 K/uL (03-17-24 @ 06:41)  WBC Count: 3.90 K/uL (03-16-24 @ 23:00)    CMP: 03-17    145  |  110<H>  |  14  ----------------------------<  119<H>  4.0   |  26  |  1.20    Ca    8.1<L>      17 Mar 2024 06:41  Mg     1.5     03-17    TPro  6.5  /  Alb  2.5<L>  /  TBili  0.5  /  DBili  x   /  AST  17  /  ALT  19  /  AlkPhos  86  03-17    LIVER FUNCTIONS - ( 17 Mar 2024 06:41 )  Alb: 2.5 g/dL / Pro: 6.5 g/dL / ALK PHOS: 86 U/L / ALT: 19 U/L / AST: 17 U/L / GGT: x           Urinalysis Basic - ( 17 Mar 2024 06:41 )    Color: x / Appearance: x / SG: x / pH: x  Gluc: 119 mg/dL / Ketone: x  / Bili: x / Urobili: x   Blood: x / Protein: x / Nitrite: x   Leuk Esterase: x / RBC: x / WBC x   Sq Epi: x / Non Sq Epi: x / Bacteria: x      Microbiology:       Radiology--  ***  Active Medications--  acetaminophen     Tablet .. 650 milliGRAM(s) Oral every 6 hours PRN  albuterol    0.083% 2.5 milliGRAM(s) Nebulizer every 4 hours PRN  aluminum hydroxide/magnesium hydroxide/simethicone Suspension 30 milliLiter(s) Oral every 4 hours PRN  apixaban 5 milliGRAM(s) Oral every 12 hours  ascorbic acid 500 milliGRAM(s) Oral daily  aspirin  chewable 81 milliGRAM(s) Oral daily  atorvastatin 40 milliGRAM(s) Oral at bedtime  bisacodyl 10 milliGRAM(s) Oral daily  cholecalciferol 1000 Unit(s) Oral daily  dextrose 5%. 1000 milliLiter(s) IV Continuous <Continuous>  dextrose 5%. 1000 milliLiter(s) IV Continuous <Continuous>  dextrose 50% Injectable 12.5 Gram(s) IV Push once  dextrose 50% Injectable 25 Gram(s) IV Push once  dextrose 50% Injectable 25 Gram(s) IV Push once  dextrose Oral Gel 15 Gram(s) Oral once PRN  digoxin     Tablet 250 MICROGram(s) Oral daily  diltiazem Infusion 10 mG/Hr IV Continuous <Continuous>  ferrous    sulfate 325 milliGRAM(s) Oral two times a day  gabapentin 300 milliGRAM(s) Oral every 12 hours  glucagon  Injectable 1 milliGRAM(s) IntraMuscular once  insulin glargine Injectable (LANTUS) 15 Unit(s) SubCutaneous every morning  insulin glargine Injectable (LANTUS) 7 Unit(s) SubCutaneous at bedtime  insulin lispro (ADMELOG) corrective regimen sliding scale   SubCutaneous three times a day before meals  insulin lispro (ADMELOG) corrective regimen sliding scale   SubCutaneous at bedtime  loratadine 10 milliGRAM(s) Oral daily  melatonin 5 milliGRAM(s) Oral at bedtime  metoclopramide 5 milliGRAM(s) Oral three times a day  metoprolol tartrate 25 milliGRAM(s) Oral two times a day  multivitamin/minerals 1 Tablet(s) Oral daily  naloxegol 25 milliGRAM(s) Oral daily  ondansetron Injectable 4 milliGRAM(s) IV Push every 6 hours PRN  oxybutynin 5 milliGRAM(s) Oral two times a day  oxyCODONE    IR 10 milliGRAM(s) Oral two times a day  pantoprazole    Tablet 40 milliGRAM(s) Oral before breakfast  piperacillin/tazobactam IVPB.. 3.375 Gram(s) IV Intermittent every 8 hours  polyethylene glycol 3350 17 Gram(s) Oral two times a day  saccharomyces boulardii 250 milliGRAM(s) Oral two times a day  senna 2 Tablet(s) Oral at bedtime  sodium chloride 0.65% Nasal 1 Spray(s) Both Nostrils daily  sodium chloride 0.9%. 1000 milliLiter(s) IV Continuous <Continuous>  sucralfate 1 Gram(s) Oral two times a day  tiotropium 2.5 MICROgram(s) Inhaler 2 Puff(s) Inhalation daily    Antimicrobials:   piperacillin/tazobactam IVPB.. 3.375 Gram(s) IV Intermittent every 8 hours    Immunologic:

## 2024-03-17 NOTE — H&P ADULT - PROBLEM SELECTOR PLAN 2
Monitor on tele  Continue iv cardizem gtt and then transition to po cardizem  S/P digoxin load  Check ECHO, TFTs  Continue Eliquis  Cardio consult  Further work-up/management pending clinical course.

## 2024-03-17 NOTE — PATIENT PROFILE ADULT - FALL HARM RISK - HARM RISK INTERVENTIONS
Assistance with ambulation/Communicate Risk of Fall with Harm to all staff/Reinforce activity limits and safety measures with patient and family/Tailored Fall Risk Interventions/Visual Cue: Yellow wristband and red socks/Bed in lowest position, wheels locked, appropriate side rails in place/Call bell, personal items and telephone in reach/Instruct patient to call for assistance before getting out of bed or chair/Non-slip footwear when patient is out of bed/[place] to call system/Physically safe environment - no spills, clutter or unnecessary equipment/Purposeful Proactive Rounding/Room/bathroom lighting operational, light cord in reach

## 2024-03-17 NOTE — PHARMACOTHERAPY INTERVENTION NOTE - COMMENTS
Recommended escalating from ceftriaxone to ertapenem 1g q24h hours in the setting of ESBL E. Coli bacteremia based on a eGFR of 71. Discussed with ID.      Anni CoelhoD   Clinical Pharmacy Specialist, Infectious Diseases  Tele-Antimicrobial Stewardship Program (Tele-ASP)  Tele-ASP Phone: [phone]   Recommended escalating from ceftriaxone to ertapenem 1g q24h hours in the setting of ESBL E. coli bacteremia based on a eGFR of 71. Discussed with ID.      Anni CoelhoD   Clinical Pharmacy Specialist, Infectious Diseases  Tele-Antimicrobial Stewardship Program (Tele-ASP)  Tele-ASP Phone: [phone]

## 2024-03-17 NOTE — H&P ADULT - PROBLEM SELECTOR PLAN 1
Admit  Sepsis likely 2/2 to CAUTI (jacques changed in ER)  Pan-culture  Continue iv Zosyn   IVF  Trend WBC, lactate  F/U culture data  ID consult  Further work-up/management pending clinical course. Admit  Septic shock likely 2/2 to CAUTI (jacques changed in ER)  Pan-culture  Continue iv Zosyn   Aggressive IVF  Trend WBC, lactate  F/U culture data  ID consult  Further work-up/management pending clinical course.

## 2024-03-18 ENCOUNTER — RESULT REVIEW (OUTPATIENT)
Age: 54
End: 2024-03-18

## 2024-03-18 LAB
ANION GAP SERPL CALC-SCNC: 7 MMOL/L — SIGNIFICANT CHANGE UP (ref 5–17)
BUN SERPL-MCNC: 19 MG/DL — SIGNIFICANT CHANGE UP (ref 7–23)
CALCIUM SERPL-MCNC: 8.2 MG/DL — LOW (ref 8.5–10.1)
CHLORIDE SERPL-SCNC: 111 MMOL/L — HIGH (ref 96–108)
CO2 SERPL-SCNC: 27 MMOL/L — SIGNIFICANT CHANGE UP (ref 22–31)
CREAT SERPL-MCNC: 1.4 MG/DL — HIGH (ref 0.5–1.3)
DIGOXIN SERPL-MCNC: 1.5 NG/ML — SIGNIFICANT CHANGE UP (ref 0.8–2)
EGFR: 59 ML/MIN/1.73M2 — LOW
GLUCOSE BLDC GLUCOMTR-MCNC: 103 MG/DL — HIGH (ref 70–99)
GLUCOSE BLDC GLUCOMTR-MCNC: 154 MG/DL — HIGH (ref 70–99)
GLUCOSE BLDC GLUCOMTR-MCNC: 172 MG/DL — HIGH (ref 70–99)
GLUCOSE BLDC GLUCOMTR-MCNC: 200 MG/DL — HIGH (ref 70–99)
GLUCOSE SERPL-MCNC: 116 MG/DL — HIGH (ref 70–99)
HCT VFR BLD CALC: 35.6 % — LOW (ref 39–50)
HGB BLD-MCNC: 11.4 G/DL — LOW (ref 13–17)
LACTATE SERPL-SCNC: 0.9 MMOL/L — SIGNIFICANT CHANGE UP (ref 0.7–2)
MAGNESIUM SERPL-MCNC: 2.2 MG/DL — SIGNIFICANT CHANGE UP (ref 1.6–2.6)
MCHC RBC-ENTMCNC: 31 PG — SIGNIFICANT CHANGE UP (ref 27–34)
MCHC RBC-ENTMCNC: 32 GM/DL — SIGNIFICANT CHANGE UP (ref 32–36)
MCV RBC AUTO: 96.7 FL — SIGNIFICANT CHANGE UP (ref 80–100)
NRBC # BLD: 0 /100 WBCS — SIGNIFICANT CHANGE UP (ref 0–0)
PLATELET # BLD AUTO: 173 K/UL — SIGNIFICANT CHANGE UP (ref 150–400)
POTASSIUM SERPL-MCNC: 3.7 MMOL/L — SIGNIFICANT CHANGE UP (ref 3.5–5.3)
POTASSIUM SERPL-SCNC: 3.7 MMOL/L — SIGNIFICANT CHANGE UP (ref 3.5–5.3)
RBC # BLD: 3.68 M/UL — LOW (ref 4.2–5.8)
RBC # FLD: 13.4 % — SIGNIFICANT CHANGE UP (ref 10.3–14.5)
SODIUM SERPL-SCNC: 145 MMOL/L — SIGNIFICANT CHANGE UP (ref 135–145)
WBC # BLD: 10.7 K/UL — HIGH (ref 3.8–10.5)
WBC # FLD AUTO: 10.7 K/UL — HIGH (ref 3.8–10.5)

## 2024-03-18 PROCEDURE — 99233 SBSQ HOSP IP/OBS HIGH 50: CPT | Mod: 25

## 2024-03-18 PROCEDURE — 93306 TTE W/DOPPLER COMPLETE: CPT | Mod: 26

## 2024-03-18 RX ORDER — DILTIAZEM HCL 120 MG
15 CAPSULE, EXT RELEASE 24 HR ORAL
Qty: 125 | Refills: 0 | Status: DISCONTINUED | OUTPATIENT
Start: 2024-03-18 | End: 2024-03-19

## 2024-03-18 RX ORDER — FUROSEMIDE 40 MG
40 TABLET ORAL EVERY 12 HOURS
Refills: 0 | Status: DISCONTINUED | OUTPATIENT
Start: 2024-03-18 | End: 2024-03-21

## 2024-03-18 RX ADMIN — OXYCODONE HYDROCHLORIDE 10 MILLIGRAM(S): 5 TABLET ORAL at 17:55

## 2024-03-18 RX ADMIN — ATORVASTATIN CALCIUM 40 MILLIGRAM(S): 80 TABLET, FILM COATED ORAL at 21:07

## 2024-03-18 RX ADMIN — Medication 81 MILLIGRAM(S): at 12:04

## 2024-03-18 RX ADMIN — Medication 1000 UNIT(S): at 12:04

## 2024-03-18 RX ADMIN — Medication 1 SPRAY(S): at 12:03

## 2024-03-18 RX ADMIN — Medication 40 MILLIGRAM(S): at 17:56

## 2024-03-18 RX ADMIN — OXYCODONE HYDROCHLORIDE 10 MILLIGRAM(S): 5 TABLET ORAL at 06:39

## 2024-03-18 RX ADMIN — POLYETHYLENE GLYCOL 3350 17 GRAM(S): 17 POWDER, FOR SOLUTION ORAL at 05:40

## 2024-03-18 RX ADMIN — Medication 1 TABLET(S): at 12:04

## 2024-03-18 RX ADMIN — Medication 15 MG/HR: at 09:55

## 2024-03-18 RX ADMIN — Medication 5 MILLIGRAM(S): at 21:07

## 2024-03-18 RX ADMIN — SENNA PLUS 2 TABLET(S): 8.6 TABLET ORAL at 21:07

## 2024-03-18 RX ADMIN — Medication 1: at 17:56

## 2024-03-18 RX ADMIN — Medication 1 GRAM(S): at 17:55

## 2024-03-18 RX ADMIN — LORATADINE 10 MILLIGRAM(S): 10 TABLET ORAL at 12:03

## 2024-03-18 RX ADMIN — NALOXEGOL OXALATE 25 MILLIGRAM(S): 12.5 TABLET, FILM COATED ORAL at 12:03

## 2024-03-18 RX ADMIN — Medication 25 MILLIGRAM(S): at 17:55

## 2024-03-18 RX ADMIN — Medication 1: at 12:04

## 2024-03-18 RX ADMIN — PANTOPRAZOLE SODIUM 40 MILLIGRAM(S): 20 TABLET, DELAYED RELEASE ORAL at 05:39

## 2024-03-18 RX ADMIN — OXYCODONE HYDROCHLORIDE 10 MILLIGRAM(S): 5 TABLET ORAL at 05:39

## 2024-03-18 RX ADMIN — Medication 325 MILLIGRAM(S): at 17:56

## 2024-03-18 RX ADMIN — Medication 250 MICROGRAM(S): at 05:39

## 2024-03-18 RX ADMIN — ERTAPENEM SODIUM 120 MILLIGRAM(S): 1 INJECTION, POWDER, LYOPHILIZED, FOR SOLUTION INTRAMUSCULAR; INTRAVENOUS at 21:06

## 2024-03-18 RX ADMIN — Medication 325 MILLIGRAM(S): at 05:39

## 2024-03-18 RX ADMIN — APIXABAN 5 MILLIGRAM(S): 2.5 TABLET, FILM COATED ORAL at 17:54

## 2024-03-18 RX ADMIN — GABAPENTIN 300 MILLIGRAM(S): 400 CAPSULE ORAL at 17:54

## 2024-03-18 RX ADMIN — Medication 15 MG/HR: at 21:58

## 2024-03-18 RX ADMIN — INSULIN GLARGINE 7 UNIT(S): 100 INJECTION, SOLUTION SUBCUTANEOUS at 21:06

## 2024-03-18 RX ADMIN — Medication 1 GRAM(S): at 05:38

## 2024-03-18 RX ADMIN — Medication 10 MILLIGRAM(S): at 12:04

## 2024-03-18 RX ADMIN — Medication 5 MILLIGRAM(S): at 05:39

## 2024-03-18 RX ADMIN — Medication 5 MILLIGRAM(S): at 17:55

## 2024-03-18 RX ADMIN — TIOTROPIUM BROMIDE 2 PUFF(S): 18 CAPSULE ORAL; RESPIRATORY (INHALATION) at 05:40

## 2024-03-18 RX ADMIN — Medication 500 MILLIGRAM(S): at 12:04

## 2024-03-18 RX ADMIN — INSULIN GLARGINE 15 UNIT(S): 100 INJECTION, SOLUTION SUBCUTANEOUS at 08:27

## 2024-03-18 RX ADMIN — APIXABAN 5 MILLIGRAM(S): 2.5 TABLET, FILM COATED ORAL at 05:39

## 2024-03-18 RX ADMIN — GABAPENTIN 300 MILLIGRAM(S): 400 CAPSULE ORAL at 05:38

## 2024-03-18 RX ADMIN — Medication 5 MILLIGRAM(S): at 05:41

## 2024-03-18 RX ADMIN — Medication 250 MILLIGRAM(S): at 05:48

## 2024-03-18 RX ADMIN — Medication 5 MILLIGRAM(S): at 14:57

## 2024-03-18 RX ADMIN — Medication 25 MILLIGRAM(S): at 05:39

## 2024-03-18 RX ADMIN — Medication 250 MILLIGRAM(S): at 17:57

## 2024-03-18 NOTE — PATIENT CHOICE NOTE. - NSPTCHOICESTATE_GEN_ALL_CORE

## 2024-03-18 NOTE — PROGRESS NOTE ADULT - SUBJECTIVE AND OBJECTIVE BOX
Date of Service: 03-18-24 @ 12:57    Patient is a 53y old  Male who presents with a chief complaint of SOB, dislodged jacques (17 Mar 2024 14:59)      INTERVAL HPI/OVERNIGHT EVENTS: Patient seen and examined. NAD. No complaints.    Vital Signs Last 24 Hrs  T(C): 36.8 (18 Mar 2024 11:57), Max: 38.9 (17 Mar 2024 19:56)  T(F): 98.3 (18 Mar 2024 11:57), Max: 102 (17 Mar 2024 19:56)  HR: 78 (18 Mar 2024 11:57) (78 - 113)  BP: 104/60 (18 Mar 2024 11:57) (100/66 - 130/78)  BP(mean): --  RR: 17 (18 Mar 2024 11:57) (17 - 18)  SpO2: 93% (18 Mar 2024 04:43) (92% - 93%)    Parameters below as of 18 Mar 2024 11:57  Patient On (Oxygen Delivery Method): nasal cannula  O2 Flow (L/min): 2      03-18    145  |  111<H>  |  19  ----------------------------<  116<H>  3.7   |  27  |  1.40<H>    Ca    8.2<L>      18 Mar 2024 06:50  Mg     2.2     03-18    TPro  6.5  /  Alb  2.5<L>  /  TBili  0.5  /  DBili  x   /  AST  17  /  ALT  19  /  AlkPhos  86  03-17                          11.4   10.70 )-----------( 173      ( 18 Mar 2024 06:50 )             35.6     PT/INR - ( 16 Mar 2024 23:00 )   PT: 15.2 sec;   INR: 1.31 ratio         PTT - ( 16 Mar 2024 23:00 )  PTT:29.8 sec  CAPILLARY BLOOD GLUCOSE      POCT Blood Glucose.: 154 mg/dL (18 Mar 2024 11:53)  POCT Blood Glucose.: 103 mg/dL (18 Mar 2024 07:58)  POCT Blood Glucose.: 169 mg/dL (17 Mar 2024 21:31)  POCT Blood Glucose.: 172 mg/dL (17 Mar 2024 16:51)    Urinalysis Basic - ( 18 Mar 2024 06:50 )    Color: x / Appearance: x / SG: x / pH: x  Gluc: 116 mg/dL / Ketone: x  / Bili: x / Urobili: x   Blood: x / Protein: x / Nitrite: x   Leuk Esterase: x / RBC: x / WBC x   Sq Epi: x / Non Sq Epi: x / Bacteria: x    Culture - Blood (03.16.24 @ 22:40)   Gram Stain:   Growth in aerobic and anaerobic bottles: Gram Negative Rods  Specimen Source: .Blood Blood-Peripheral  Culture Results:   Growth in aerobic and anaerobic bottles: Escherichia coli   See previous culture [id number]      Historical Values  Culture - Blood (03.16.24 @ 22:40)   Gram Stain:   Growth in aerobic and anaerobic bottles: Gram Negative Rods  Specimen Source: .Blood Blood-Peripheral  Culture Results:   Growth in aerobic and anaerobic bottles: Escherichia coli   See previous culture [id number] - Blood (03.16.24 @ 22:35)   - ESBL: Detec  - Escherichia coli: Detec  - CTX-M Resistance Marker: Detec  Gram Stain:   Growth in aerobic bottle: Gram Negative Rods   Growth in anaerobic bottle: Gram Negative Rods  Specimen Source: .Blood Blood-Peripheral  Organism: Blood Culture PCR  Culture Results:   Growth in aerobic and anaerobic bottles: Escherichia coli   Direct identification is available within approximately 3-5   hours either by Blood Panel Multiplexed PCR or Direct           acetaminophen     Tablet .. 650 milliGRAM(s) Oral every 6 hours PRN  albuterol    0.083% 2.5 milliGRAM(s) Nebulizer every 4 hours PRN  aluminum hydroxide/magnesium hydroxide/simethicone Suspension 30 milliLiter(s) Oral every 4 hours PRN  apixaban 5 milliGRAM(s) Oral every 12 hours  ascorbic acid 500 milliGRAM(s) Oral daily  aspirin  chewable 81 milliGRAM(s) Oral daily  atorvastatin 40 milliGRAM(s) Oral at bedtime  bisacodyl 10 milliGRAM(s) Oral daily  cholecalciferol 1000 Unit(s) Oral daily  dextrose 5%. 1000 milliLiter(s) IV Continuous <Continuous>  dextrose 5%. 1000 milliLiter(s) IV Continuous <Continuous>  dextrose 50% Injectable 12.5 Gram(s) IV Push once  dextrose 50% Injectable 25 Gram(s) IV Push once  dextrose 50% Injectable 25 Gram(s) IV Push once  dextrose Oral Gel 15 Gram(s) Oral once PRN  digoxin     Tablet 250 MICROGram(s) Oral daily  diltiazem Infusion 15 mG/Hr IV Continuous <Continuous>  ertapenem  IVPB 1000 milliGRAM(s) IV Intermittent every 24 hours  ferrous    sulfate 325 milliGRAM(s) Oral two times a day  furosemide   Injectable 40 milliGRAM(s) IV Push every 12 hours  gabapentin 300 milliGRAM(s) Oral every 12 hours  glucagon  Injectable 1 milliGRAM(s) IntraMuscular once  insulin glargine Injectable (LANTUS) 15 Unit(s) SubCutaneous every morning  insulin glargine Injectable (LANTUS) 7 Unit(s) SubCutaneous at bedtime  insulin lispro (ADMELOG) corrective regimen sliding scale   SubCutaneous three times a day before meals  insulin lispro (ADMELOG) corrective regimen sliding scale   SubCutaneous at bedtime  loratadine 10 milliGRAM(s) Oral daily  melatonin 5 milliGRAM(s) Oral at bedtime  metoclopramide 5 milliGRAM(s) Oral three times a day  metoprolol tartrate 25 milliGRAM(s) Oral two times a day  multivitamin/minerals 1 Tablet(s) Oral daily  naloxegol 25 milliGRAM(s) Oral daily  ondansetron Injectable 4 milliGRAM(s) IV Push every 6 hours PRN  oxybutynin 5 milliGRAM(s) Oral two times a day  oxyCODONE    IR 10 milliGRAM(s) Oral two times a day  pantoprazole    Tablet 40 milliGRAM(s) Oral before breakfast  polyethylene glycol 3350 17 Gram(s) Oral two times a day  saccharomyces boulardii 250 milliGRAM(s) Oral two times a day  senna 2 Tablet(s) Oral at bedtime  sodium chloride 0.65% Nasal 1 Spray(s) Both Nostrils daily  sodium chloride 0.9%. 1000 milliLiter(s) IV Continuous <Continuous>  sucralfate 1 Gram(s) Oral two times a day  tiotropium 2.5 MICROgram(s) Inhaler 2 Puff(s) Inhalation daily              REVIEW OF SYSTEMS:  CONSTITUTIONAL: No fever, no weight loss, or no fatigue  NECK: No pain, no stiffness  RESPIRATORY: No cough, no wheezing, no chills, no hemoptysis, No shortness of breath  CARDIOVASCULAR: No chest pain, no palpitations, no dizziness, no leg swelling  GASTROINTESTINAL: No abdominal pain. No nausea, no vomiting, no hematemesis; No diarrhea, no constipation. No melena, no hematochezia.  GENITOURINARY: No dysuria, no frequency, no hematuria, no incontinence  NEUROLOGICAL: No headaches, no loss of strength, no numbness, no tremors  SKIN: No itching, no burning  MUSCULOSKELETAL: No joint pain, no swelling; No muscle, no back, no extremity pain  PSYCHIATRIC: No depression, no mood swings,   HEME/LYMPH: No easy bruising, no bleeding gums  ALLERY AND IMMUNOLOGIC: No hives       Consultant(s) Notes Reviewed:  [X] YES  [ ] NO    PHYSICAL EXAM:  GENERAL: NAD  HEAD:  Atraumatic, Normocephalic  EYES: EOMI, PERRLA, conjunctiva and sclera clear  ENMT: No tonsillar erythema, exudates, or enlargement; Moist mucous membranes  NECK: Supple, No JVD  NERVOUS SYSTEM:  Awake & alert  CHEST/LUNG: Clear to auscultation bilaterally; No rales, rhonchi, wheezing,  HEART: Regular rate and rhythm  ABDOMEN: Soft, Nontender, Nondistended; Bowel sounds present  EXTREMITIES:  No clubbing, cyanosis, or edema  LYMPH: No lymphadenopathy noted  SKIN: No rashes      Advanced care planning discussed with patient/family [X] YES   [ ] NO    Advanced care planning discussed with patient/family. Patient's health status was discussed. All appropriate changes have been made regarding patient's end-of-life care. Advanced care planning forms reviewed/discussed/completed.  20 minutes spent.

## 2024-03-18 NOTE — PROGRESS NOTE ADULT - SUBJECTIVE AND OBJECTIVE BOX
[facility] DIV[facility] of INFECTIOUS DISEASE  Juventino Whitten MD PhD, Symone Hickey MD, Anne-Marie Li MD, Jeffery Jacobs MD, Ryan Romeo MD  and providing coverage with Melody Armstrong MD  Providing Infectious Disease Consultations at [facility], [facility], [facility]'s    Office# [phone] to schedule follow up appointments  Answering Service for urgent calls or New Consults [phone]  Cell# to text for urgent issues Juventino Whitten [phone]     infectious diseases progress note:    SARAH MORRISON is a 53y y. o. Male patient    Overnight and events of the last 24hrs reviewed    Allergies    No Known Drug Allergies  fish (Hives)    Intolerances        ANTIBIOTICS/RELEVANT:  antimicrobials  ertapenem  IVPB 1000 milliGRAM(s) IV Intermittent every 24 hours    immunologic:    OTHER:  acetaminophen     Tablet .. 650 milliGRAM(s) Oral every 6 hours PRN  albuterol    0.083% 2.5 milliGRAM(s) Nebulizer every 4 hours PRN  aluminum hydroxide/magnesium hydroxide/simethicone Suspension 30 milliLiter(s) Oral every 4 hours PRN  apixaban 5 milliGRAM(s) Oral every 12 hours  ascorbic acid 500 milliGRAM(s) Oral daily  aspirin  chewable 81 milliGRAM(s) Oral daily  atorvastatin 40 milliGRAM(s) Oral at bedtime  bisacodyl 10 milliGRAM(s) Oral daily  cholecalciferol 1000 Unit(s) Oral daily  dextrose 5%. 1000 milliLiter(s) IV Continuous <Continuous>  dextrose 5%. 1000 milliLiter(s) IV Continuous <Continuous>  dextrose 50% Injectable 12.5 Gram(s) IV Push once  dextrose 50% Injectable 25 Gram(s) IV Push once  dextrose 50% Injectable 25 Gram(s) IV Push once  dextrose Oral Gel 15 Gram(s) Oral once PRN  digoxin     Tablet 250 MICROGram(s) Oral daily  diltiazem Infusion 15 mG/Hr IV Continuous <Continuous>  ferrous    sulfate 325 milliGRAM(s) Oral two times a day  furosemide   Injectable 40 milliGRAM(s) IV Push every 12 hours  gabapentin 300 milliGRAM(s) Oral every 12 hours  glucagon  Injectable 1 milliGRAM(s) IntraMuscular once  insulin glargine Injectable (LANTUS) 15 Unit(s) SubCutaneous every morning  insulin glargine Injectable (LANTUS) 7 Unit(s) SubCutaneous at bedtime  insulin lispro (ADMELOG) corrective regimen sliding scale   SubCutaneous three times a day before meals  insulin lispro (ADMELOG) corrective regimen sliding scale   SubCutaneous at bedtime  loratadine 10 milliGRAM(s) Oral daily  melatonin 5 milliGRAM(s) Oral at bedtime  metoclopramide 5 milliGRAM(s) Oral three times a day  metoprolol tartrate 25 milliGRAM(s) Oral two times a day  multivitamin/minerals 1 Tablet(s) Oral daily  naloxegol 25 milliGRAM(s) Oral daily  ondansetron Injectable 4 milliGRAM(s) IV Push every 6 hours PRN  oxybutynin 5 milliGRAM(s) Oral two times a day  oxyCODONE    IR 10 milliGRAM(s) Oral two times a day  pantoprazole    Tablet 40 milliGRAM(s) Oral before breakfast  polyethylene glycol 3350 17 Gram(s) Oral two times a day  saccharomyces boulardii 250 milliGRAM(s) Oral two times a day  senna 2 Tablet(s) Oral at bedtime  sodium chloride 0.65% Nasal 1 Spray(s) Both Nostrils daily  sucralfate 1 Gram(s) Oral two times a day  tiotropium 2.5 MICROgram(s) Inhaler 2 Puff(s) Inhalation daily      Objective:  Vital Signs Last 24 Hrs  T(C): 36.8 (18 Mar 2024 11:57), Max: 38.9 (17 Mar 2024 19:56)  T(F): 98.3 (18 Mar 2024 11:57), Max: 102 (17 Mar 2024 19:56)  HR: 78 (18 Mar 2024 11:57) (78 - 113)  BP: 104/60 (18 Mar 2024 11:57) (100/66 - 130/78)  BP(mean): --  RR: 17 (18 Mar 2024 11:57) (17 - 18)  SpO2: 93% (18 Mar 2024 04:43) (92% - 93%)    Parameters below as of 18 Mar 2024 11:57  Patient On (Oxygen Delivery Method): nasal cannula  O2 Flow (L/min): 2      T(C): 36.8 (03-18-24 @ 11:57), Max: 39.9 (03-16-24 @ 22:56)  T(C): 36.8 (03-18-24 @ 11:57), Max: 39.9 (03-16-24 @ 22:56)  T(C): 36.8 (03-18-24 @ 11:57), Max: 39.9 (03-16-24 @ 22:56)    PHYSICAL EXAM:  HEENT: NC atraumatic  Neck: supple  Respiratory: no accessory muscle use, breathing comfortably  Cardiovascular: distant  Gastrointestinal: normal appearing, nondistended  Extremities: no clubbing, no cyanosis,        LABS:                          11.4   10.70 )-----------( 173      ( 18 Mar 2024 06:50 )             35.6       WBC  10.70 03-18 @ 06:50  19.30 03-17 @ 06:41  3.90 03-16 @ 23:00      03-18    145  |  111<H>  |  19  ----------------------------<  116<H>  3.7   |  27  |  1.40<H>    Ca    8.2<L>      18 Mar 2024 06:50  Mg     2.2     03-18    TPro  6.5  /  Alb  2.5<L>  /  TBili  0.5  /  DBili  x   /  AST  17  /  ALT  19  /  AlkPhos  86  03-17      Creatinine: 1.40 mg/dL (03-18-24 @ 06:50)  Creatinine: 1.20 mg/dL (03-17-24 @ 06:41)  Creatinine: 1.30 mg/dL (03-16-24 @ 23:00)      PT/INR - ( 16 Mar 2024 23:00 )   PT: 15.2 sec;   INR: 1.31 ratio         PTT - ( 16 Mar 2024 23:00 )  PTT:29.8 sec  Urinalysis Basic - ( 18 Mar 2024 06:50 )    Color: x / Appearance: x / SG: x / pH: x  Gluc: 116 mg/dL / Ketone: x  / Bili: x / Urobili: x   Blood: x / Protein: x / Nitrite: x   Leuk Esterase: x / RBC: x / WBC x   Sq Epi: x / Non Sq Epi: x / Bacteria: x            INFLAMMATORY MARKERS      MICROBIOLOGY:    Culture - Blood (03.16.24 @ 22:35)    Gram Stain:   Growth in aerobic bottle: Gram Negative Rods  Growth in anaerobic bottle: Gram Negative Rods   -  Escherichia coli: Detec   -  ESBL: Detec   -  CTX-M Resistance Marker: Detec   Specimen Source: .Blood Blood-Peripheral   Organism: Blood Culture PCR   Culture Results:   Growth in aerobic and anaerobic bottles: Escherichia coli  Direct identification is available within approximately 3-5  hours either by Blood Panel Multiplexed PCR or Direct  MALDI-TOF. Details: [email or web address]   Organism Identification: Blood Culture PCR   Method Type: PCR        RADIOLOGY & ADDITIONAL STUDIES:

## 2024-03-18 NOTE — PROGRESS NOTE ADULT - SUBJECTIVE AND OBJECTIVE BOX
[facility] -- Brittany Lutz Pannella, Patel, Savella, Goodger, Cohen  Office # [phone]    Follow Up: Afib w/ RVR, CAD    Subjective/Observations: seen and examined, awake, alert, resting in bed, denies chest pain, dyspnea, palpitations or dizziness, orthopnea and PND. on O2 via NC. hep gtt and  ABX infusing     REVIEW OF SYSTEMS: All other review of systems is negative unless indicated above  PAST MEDICAL & SURGICAL HISTORY:  Diabetes      Diabetes mellitus with no complication      Afib      Hypertension      BPH (benign prostatic hyperplasia)      Perforated gastric ulcer  s/p emergent ex-lap omentopexy and plication 6/2019      Pulmonary embolism      History of non-ST elevation myocardial infarction (NSTEMI)      Osteomyelitis  s/p debridement      CAD S/P percutaneous coronary angioplasty      Cerebrovascular accident      H/O abdominal surgery      Perforated gastric ulcer      Traumatic amputation of left foot, initial encounter        MEDICATIONS  (STANDING):  apixaban 5 milliGRAM(s) Oral every 12 hours  ascorbic acid 500 milliGRAM(s) Oral daily  aspirin  chewable 81 milliGRAM(s) Oral daily  atorvastatin 40 milliGRAM(s) Oral at bedtime  bisacodyl 10 milliGRAM(s) Oral daily  cholecalciferol 1000 Unit(s) Oral daily  dextrose 5%. 1000 milliLiter(s) (100 mL/Hr) IV Continuous <Continuous>  dextrose 5%. 1000 milliLiter(s) (50 mL/Hr) IV Continuous <Continuous>  dextrose 50% Injectable 12.5 Gram(s) IV Push once  dextrose 50% Injectable 25 Gram(s) IV Push once  dextrose 50% Injectable 25 Gram(s) IV Push once  digoxin     Tablet 250 MICROGram(s) Oral daily  diltiazem Infusion 10 mG/Hr (10 mL/Hr) IV Continuous <Continuous>  ertapenem  IVPB 1000 milliGRAM(s) IV Intermittent every 24 hours  ferrous    sulfate 325 milliGRAM(s) Oral two times a day  gabapentin 300 milliGRAM(s) Oral every 12 hours  glucagon  Injectable 1 milliGRAM(s) IntraMuscular once  insulin glargine Injectable (LANTUS) 15 Unit(s) SubCutaneous every morning  insulin glargine Injectable (LANTUS) 7 Unit(s) SubCutaneous at bedtime  insulin lispro (ADMELOG) corrective regimen sliding scale   SubCutaneous three times a day before meals  insulin lispro (ADMELOG) corrective regimen sliding scale   SubCutaneous at bedtime  loratadine 10 milliGRAM(s) Oral daily  melatonin 5 milliGRAM(s) Oral at bedtime  metoclopramide 5 milliGRAM(s) Oral three times a day  metoprolol tartrate 25 milliGRAM(s) Oral two times a day  multivitamin/minerals 1 Tablet(s) Oral daily  naloxegol 25 milliGRAM(s) Oral daily  oxybutynin 5 milliGRAM(s) Oral two times a day  oxyCODONE    IR 10 milliGRAM(s) Oral two times a day  pantoprazole    Tablet 40 milliGRAM(s) Oral before breakfast  polyethylene glycol 3350 17 Gram(s) Oral two times a day  saccharomyces boulardii 250 milliGRAM(s) Oral two times a day  senna 2 Tablet(s) Oral at bedtime  sodium chloride 0.65% Nasal 1 Spray(s) Both Nostrils daily  sodium chloride 0.9%. 1000 milliLiter(s) (100 mL/Hr) IV Continuous <Continuous>  sucralfate 1 Gram(s) Oral two times a day  tiotropium 2.5 MICROgram(s) Inhaler 2 Puff(s) Inhalation daily    MEDICATIONS  (PRN):  acetaminophen     Tablet .. 650 milliGRAM(s) Oral every 6 hours PRN Temp greater or equal to 38C (100.4F), Mild Pain (1 - 3)  albuterol    0.083% 2.5 milliGRAM(s) Nebulizer every 4 hours PRN Shortness of Breath and/or Wheezing  aluminum hydroxide/magnesium hydroxide/simethicone Suspension 30 milliLiter(s) Oral every 4 hours PRN Dyspepsia  dextrose Oral Gel 15 Gram(s) Oral once PRN Blood Glucose LESS THAN 70 milliGRAM(s)/deciliter  ondansetron Injectable 4 milliGRAM(s) IV Push every 6 hours PRN Nausea and/or Vomiting    Allergies    No Known Drug Allergies  fish (Hives)    Intolerances      Vital Signs Last 24 Hrs  T(C): 36.8 (18 Mar 2024 04:43), Max: 38.9 (17 Mar 2024 19:56)  T(F): 98.2 (18 Mar 2024 04:43), Max: 102 (17 Mar 2024 19:56)  HR: 113 (18 Mar 2024 04:43) (80 - 124)  BP: 130/78 (18 Mar 2024 04:43) (100/66 - 130/78)  BP(mean): --  RR: 18 (18 Mar 2024 04:43) (18 - 19)  SpO2: 93% (18 Mar 2024 04:43) (92% - 97%)    Parameters below as of 18 Mar 2024 04:43  Patient On (Oxygen Delivery Method): nasal cannula  O2 Flow (L/min): 2    I&O's Summary    17 Mar 2024 07:01  -  18 Mar 2024 07:00  --------------------------------------------------------  IN: 0 mL / OUT: 800 mL / NET: -800 mL          TELE: Afib 's   PHYSICAL EXAM:  Constitutional: NAD, awake and alert  HEENT: Moist Mucous Membranes, Anicteric  Pulmonary: Non-labored, breath sounds are clear bilaterally, No wheezing, rales or rhonchi  Cardiovascular: IRRR, S1 and S2, No murmurs, rubs, gallops or clicks  Gastrointestinal: Bowel Sounds present, soft, nontender.   Lymph: 1+ pitting b/l le edema. No lymphadenopathy.  Skin: No visible rashes or ulcers.  Psych:  Mood & affect appropriate  LABS: All Labs Reviewed:                        11.4   10.70 )-----------( 173      ( 18 Mar 2024 06:50 )             35.6                         12.0   19.30 )-----------( 188      ( 17 Mar 2024 06:41 )             37.4                         12.4   3.90  )-----------( 239      ( 16 Mar 2024 23:00 )             38.9     18 Mar 2024 06:50    145    |  111    |  19     ----------------------------<  116    3.7     |  27     |  1.40   17 Mar 2024 06:41    145    |  110    |  14     ----------------------------<  119    4.0     |  26     |  1.20   16 Mar 2024 23:00    144    |  108    |  14     ----------------------------<  115    4.8     |  27     |  1.30     Ca    8.2        18 Mar 2024 06:50  Ca    8.1        17 Mar 2024 06:41  Ca    9.1        16 Mar 2024 23:00  Mg     2.2       18 Mar 2024 06:50  Mg     1.5       17 Mar 2024 06:41    TPro  6.5    /  Alb  2.5    /  TBili  0.5    /  DBili  x      /  AST  17     /  ALT  19     /  AlkPhos  86     17 Mar 2024 06:41  TPro  7.6    /  Alb  2.8    /  TBili  0.6    /  DBili  x      /  AST  38     /  ALT  20     /  AlkPhos  103    16 Mar 2024 23:00    PT/INR - ( 16 Mar 2024 23:00 )   PT: 15.2 sec;   INR: 1.31 ratio         PTT - ( 16 Mar 2024 23:00 )  PTT:29.8 sec      12 Lead ECG:   Ventricular Rate 154 BPM    QRS Duration 76 ms    Q-T Interval 282 ms    QTC Calculation(Bazett) 451 ms    R Axis 15 degrees    T Axis 194 degrees    Diagnosis Line *** Critical Test Result: High HR  Atrial fibrillation with rapid ventricular response  Low voltage QRS  Nonspecific ST and T wave abnormality    Confirmed by JOHN KU ([id number]) on 3/17/2024 7:30:13 AM (03-16-24 @ 23:50)      TRANSTHORACIC ECHOCARDIOGRAM REPORT  ________________________________________________________________________________                                      _______       Pt. Name:       SARAH MORIRSON Study Date:    12/23/2023  MRN:            [id number]         YOB: 1970  Accession #:    [id number]        Age:           53 years  Account#:       [id number]       Gender:        M  Heart Rate:                      Height:        74.02 in (188.00 cm)  Rhythm:Weight:        255.73 lb (116.00 kg)  Blood Pressure: 102/58 mmHg      BSA/BMI:       2.41 m² / 32.82 kg/m²  ________________________________________________________________________________________  Referring Physician:    [id number] Hill Brizuela  Interpreting Physician: Jeovany France  Primary Sonographer:    Heidi Redd RDCS    CPT:               ECHO TTE WO CON COMP W DOPP - 97784.m  Indication(s):     Abnormal electrocardiogram ECG/EKG - R94.31  Procedure:         Transthoracic echocardiogram with 2-D, M-mode and complete                     spectral and color flow Doppler.  Ordering Location: [facility]    _______________________________________________________________________________________     CONCLUSIONS:      1. Left ventricular systolic function is normal with an ejection fraction visually estimated at 55 to 60 %.   2. Moderate left ventricular hypertrophy.   3. The left ventricular diastolic function is indeterminate.   4. Normal right ventricular cavity size, wall thickness, and systolic function.   5. The left atrium is moderately dilated.   6. The right atrium is moderately dilated in size.   7. There is calcification of the mitral valve annulus.   8. Moderate mitral regurgitation.   9. Calcification of the aortic valve leaflets.  10. Mild tricuspid regurgitation.  11. Estimated pulmonary artery systolic pressure is 31 mmHg, consistent with normal pulmonary artery pressure.  12. Trace pericardial effusion.    ________________________________________________________________________________________  FINDINGS:     Left Ventricle:  Left ventricular systolic function is normal with an ejection fraction visually estimated at 55 to 60%. There are no regional wall motion abnormalities seen. The left ventricular diastolicfunction is indeterminate. Moderate left ventricular hypertrophy.     Right Ventricle:  The right ventricular cavity is normal in size, normal wall thickness and normal systolic function.     Left Atrium:  The left atrium is moderately dilated with an indexed volume of 47.25 ml/m².     Right Atrium:  The right atrium is moderately dilated in size with an indexed volume of 35.48 ml/m².     Aortic Valve:  The aortic valve appears trileaflet. There is calcification of the aortic valve leaflets. There is no aortic valve stenosis. There is no evidence of aortic regurgitation.     Mitral Valve:  There is mitral valve thickening of the anterior and posterior leaflets. There is calcification of the mitral valve annulus. There is moderate mitral regurgitation.     Tricuspid Valve:  Structurally normal tricuspid valve with normal leaflet excursion. There is mild tricuspid regurgitation. Estimated pulmonary artery systolic pressure is 31 mmHg, consistent with normal pulmonary artery pressure.     Pulmonic Valve:  Structurally normal pulmonic valve with normal leaflet excursion.     Aorta:  The aortic root at the sinuses of Valsalva is normal in size, measuring 3.60 cm (indexed 1.49 cm/m²). The aortic arch diameter is normal in size, measuring 2.3 cm (indexed 0.95 cm/m²).     Pericardium:  There is a trace pericardial effusion.     Systemic Veins:  The inferior vena cava is normal in size measuring 1.91 cm in diameter, (normal <2.1cm) with normal inspiratory collapse (normal >50%) consistent with normal right atrial pressure (~3, range 0-5mmHg).  ____________________________________________________________________  QUANTITATIVE DATA:  Left Ventricle Measurements: (Indexed to BSA)     IVSd (2D):   1.4 cm  LVPWd (2D):  1.4 cm  LVIDd (2D):  4.8 cm  LVIDs (2D):  3.3 cm  LV Mass:     283 g  117.1 g/m²  Visualized LV EF%: 55 to 60%     MV E Vmax:    1.49 m/s  e' lateral:   11.90 cm/s  e' medial:    8.38 cm/s  E/e' lateral: 12.52  E/e' medial:  17.78  E/e' Average: 14.69  MV DT:        102 msec    Aorta Measurements: (normal range) (Indexed to BSA)     Sinuses of Valsalva: 3.60 cm (3.1 - 3.7 cm)  Ao Arch:             2.3 cm       Left Atrium Measurements: (Indexed to BSA)  LA Diam 2D: 5.00 cm    Right Atrial Measurements:     RA Vol:  85.60 ml  RA Vol Index: 35.48 ml/m²    Mitral Valve Measurements:     MV E Vmax: 1.5 m/s         MR Vmax:          4.64 m/s                             MR Peak Gradient: 86.1 mmHg       Tricuspid Valve Measurements:     TR Vmax:          2.7 m/s  TR Peak Gradient: 28.3 mmHg  RA Pressure:      3 mmHg  PASP:             31 mmHg    ________________________________________________________________________________________  Electronically signed on 12/23/2023 at 3:54:22 PM by Jeovany France         *** Final ***

## 2024-03-18 NOTE — PROGRESS NOTE ADULT - ASSESSMENT
52 y/o M PMhx AF on Eliquis, chronic jacques, CAD s/p stents, CVA, DM, HTN, PE who presented w/ SOB, and dislodged jacques as well as dysuria.  also found to have Afib w/ RVR  admitted with  concerns for sepsis, CAUTI, dislodged jacques  reported dysuria  jacques placed in ED  CT abd/pelvis- No hydronephrosis or nephrolithiasis.    Recommendations  1-Bacteremia ESBL E coli (suspected pyelonephritis)   -  Escherichia coli: Detec   -  ESBL: Detec   -  CTX-M Resistance Marker: Detec  ertapenem started 3/17 continue for now  Repeat blood cultures collected 3/18 am -NGTD  Urine culture--NGTD    Thank you for consulting us and involving us in the management of this most interesting and challenging case.  We will follow along in the care of this patient. Please call us at [phone] or text me directly on my cell# at [phone] with any concerns.

## 2024-03-18 NOTE — PROGRESS NOTE ADULT - SUBJECTIVE AND OBJECTIVE BOX
Date/Time Patient Seen:  		  Referring MD:   Data Reviewed	       Patient is a 53y old  Male who presents with a chief complaint of SOB, dislodged jacques (17 Mar 2024 14:59)      Subjective/HPI     PAST MEDICAL & SURGICAL HISTORY:  No pertinent past medical history    Diabetes    No pertinent past medical history    Diabetes mellitus with no complication    Afib    Hypertension    BPH (benign prostatic hyperplasia)    Perforated gastric ulcer  s/p emergent ex-lap omentopexy and plication 6/2019    Pulmonary embolism    History of non-ST elevation myocardial infarction (NSTEMI)    Osteomyelitis  s/p debridement    CAD S/P percutaneous coronary angioplasty    Cerebrovascular accident    No significant past surgical history    No significant past surgical history    H/O abdominal surgery    Perforated gastric ulcer    Traumatic amputation of left foot, initial encounter          Medication list         MEDICATIONS  (STANDING):  apixaban 5 milliGRAM(s) Oral every 12 hours  ascorbic acid 500 milliGRAM(s) Oral daily  aspirin  chewable 81 milliGRAM(s) Oral daily  atorvastatin 40 milliGRAM(s) Oral at bedtime  bisacodyl 10 milliGRAM(s) Oral daily  cholecalciferol 1000 Unit(s) Oral daily  dextrose 5%. 1000 milliLiter(s) (50 mL/Hr) IV Continuous <Continuous>  dextrose 5%. 1000 milliLiter(s) (100 mL/Hr) IV Continuous <Continuous>  dextrose 50% Injectable 12.5 Gram(s) IV Push once  dextrose 50% Injectable 25 Gram(s) IV Push once  dextrose 50% Injectable 25 Gram(s) IV Push once  digoxin     Tablet 250 MICROGram(s) Oral daily  diltiazem Infusion 10 mG/Hr (10 mL/Hr) IV Continuous <Continuous>  ertapenem  IVPB 1000 milliGRAM(s) IV Intermittent every 24 hours  ferrous    sulfate 325 milliGRAM(s) Oral two times a day  gabapentin 300 milliGRAM(s) Oral every 12 hours  glucagon  Injectable 1 milliGRAM(s) IntraMuscular once  insulin glargine Injectable (LANTUS) 15 Unit(s) SubCutaneous every morning  insulin glargine Injectable (LANTUS) 7 Unit(s) SubCutaneous at bedtime  insulin lispro (ADMELOG) corrective regimen sliding scale   SubCutaneous three times a day before meals  insulin lispro (ADMELOG) corrective regimen sliding scale   SubCutaneous at bedtime  loratadine 10 milliGRAM(s) Oral daily  melatonin 5 milliGRAM(s) Oral at bedtime  metoclopramide 5 milliGRAM(s) Oral three times a day  metoprolol tartrate 25 milliGRAM(s) Oral two times a day  multivitamin/minerals 1 Tablet(s) Oral daily  naloxegol 25 milliGRAM(s) Oral daily  oxybutynin 5 milliGRAM(s) Oral two times a day  oxyCODONE    IR 10 milliGRAM(s) Oral two times a day  pantoprazole    Tablet 40 milliGRAM(s) Oral before breakfast  polyethylene glycol 3350 17 Gram(s) Oral two times a day  saccharomyces boulardii 250 milliGRAM(s) Oral two times a day  senna 2 Tablet(s) Oral at bedtime  sodium chloride 0.65% Nasal 1 Spray(s) Both Nostrils daily  sodium chloride 0.9%. 1000 milliLiter(s) (100 mL/Hr) IV Continuous <Continuous>  sucralfate 1 Gram(s) Oral two times a day  tiotropium 2.5 MICROgram(s) Inhaler 2 Puff(s) Inhalation daily    MEDICATIONS  (PRN):  acetaminophen     Tablet .. 650 milliGRAM(s) Oral every 6 hours PRN Temp greater or equal to 38C (100.4F), Mild Pain (1 - 3)  albuterol    0.083% 2.5 milliGRAM(s) Nebulizer every 4 hours PRN Shortness of Breath and/or Wheezing  aluminum hydroxide/magnesium hydroxide/simethicone Suspension 30 milliLiter(s) Oral every 4 hours PRN Dyspepsia  dextrose Oral Gel 15 Gram(s) Oral once PRN Blood Glucose LESS THAN 70 milliGRAM(s)/deciliter  ondansetron Injectable 4 milliGRAM(s) IV Push every 6 hours PRN Nausea and/or Vomiting         Vitals log        ICU Vital Signs Last 24 Hrs  T(C): 36.8 (18 Mar 2024 04:43), Max: 38.9 (17 Mar 2024 19:56)  T(F): 98.2 (18 Mar 2024 04:43), Max: 102 (17 Mar 2024 19:56)  HR: 113 (18 Mar 2024 04:43) (80 - 140)  BP: 130/78 (18 Mar 2024 04:43) (100/66 - 130/78)  BP(mean): --  ABP: --  ABP(mean): --  RR: 18 (18 Mar 2024 04:43) (18 - 19)  SpO2: 93% (18 Mar 2024 04:43) (92% - 97%)    O2 Parameters below as of 18 Mar 2024 04:43  Patient On (Oxygen Delivery Method): nasal cannula  O2 Flow (L/min): 2               Input and Output:  I&O's Detail    17 Mar 2024 07:01  -  18 Mar 2024 06:04  --------------------------------------------------------  IN:  Total IN: 0 mL    OUT:    Voided (mL): 800 mL  Total OUT: 800 mL    Total NET: -800 mL          Lab Data                        12.0   19.30 )-----------( 188      ( 17 Mar 2024 06:41 )             37.4     03-17    145  |  110<H>  |  14  ----------------------------<  119<H>  4.0   |  26  |  1.20    Ca    8.1<L>      17 Mar 2024 06:41  Mg     1.5     03-17    TPro  6.5  /  Alb  2.5<L>  /  TBili  0.5  /  DBili  x   /  AST  17  /  ALT  19  /  AlkPhos  86  03-17            Review of Systems	      Objective     Physical Examination    heart s1s2  lung dc BS  head nc      Pertinent Lab findings & Imaging      Ale:  NO   Adequate UO     I&O's Detail    17 Mar 2024 07:01  -  18 Mar 2024 06:04  --------------------------------------------------------  IN:  Total IN: 0 mL    OUT:    Voided (mL): 800 mL  Total OUT: 800 mL    Total NET: -800 mL               Discussed with:     Cultures:	        Radiology

## 2024-03-18 NOTE — PROGRESS NOTE ADULT - ASSESSMENT
53M with PMH of AF on Eliquis, indwelling jacques, CAD s/p stents, CVA, DM, HTN, osteomyelitis s/p debridement, PE, sent from [facility] for elevated BP, SOB, and dislodged jacques    AF  Emphysema  Atelectasis  CAD  CVA  DM  HTN  OP  OA  hx of OM  hx of PE    ID and Cardio Eval noted  procalcitonin noted  fio2 support in progress  on ABX  monitor for VOL overload    ct neg for pe - atelectasis - emphysema  spiriva - nebs prn - VBG  cardio eval in progress  cvs rx regimen  proBNP noted to be elevated  consideration for diuresis - as per cardio recs  TTE reviewed  AF management  rate and rhythm control  GERARDO eval as outpatient  monitored unit admission  pulse ox monitoring  goal sat > 88 pct  DM care  serial FS  OLD records reviewed

## 2024-03-18 NOTE — CARE COORDINATION ASSESSMENT. - CURRENT STRESSORS OF PATIENT
hospitalization (1) swollen legs (current)/(1) other risk factor (includes escalating BMI, pack-years of smoking, diabetes requiring insulin, chemotherapy, female gender and length of surgery)

## 2024-03-18 NOTE — PROGRESS NOTE ADULT - ASSESSMENT
53M with PMH of AF on Eliquis, indwelling Aguilera CAD s/p stents, CVA, DM, HTN, osteomyelitis s/p debridement, PE, sent from [facility] for elevated BP, SOB, and dislodged Aguilera, found to be febrile in ER, a/w sepsis and A fib RVR.     Sepsis, Afib RVR, CAD  - Known A fib, now -150s on admission   - telemetry reviewed overnight Afib 's   - s/p Cardizem 10mg IVP x 1  - continue Cardizem would increase to 15 mg/hr (ordered)   - s/p digoxin loaded ( digoxin 500 mcg IV + 250 mg PO) d/t hypotension and persistent AF with RVR in 140s-150s.   - Continue digoxin 250 mg PO daily   - continue Metoprolol 25 mg PO BID with hold parameters, can uptitrate if HR remains uncontrolled    - Continue Eliquis for AC, admits compliance    - still SOB on O2 supplementation (wean as tolerated) and LE edema   - TTE (12/2023) normal LV & RV size and function EF 55-60%, indeterminate DD, mod dilated LA and RA, mod MR, mild TR, PASP 31  - Please obtain transthoracic echocardiogram to assess ventricular function, wall motion and valvular abnormalities  - BNP 7622  - Mild vol ol on examination with LE edema   - Creatinine:  1.40 <--1.20,  <--1.30  - continue Lasix 40 mg IV BID    - EKG: A fib RVR @ 154  - Troponin: <-51.5, no trend there after   - No evidence of any active ischemia   - no anginal complaints   - Continue aspirin and statin     - BP stable 100- 130's systolics   - continue to hold home Imdur and losartan to allow room for AV nodals     - Sepsis likely 2/2 UTI  - Management per primary team     - Monitor and replete Lytes. Keep K > 4 and Mg > 2  - Will continue to follow.    HANNAH GrahamCNP  Nurse Practitioner - Cardiology   call TEAMS

## 2024-03-18 NOTE — CARE COORDINATION ASSESSMENT. - OTHER PERTINENT REFERRAL INFORMATION
SW met with pt at bedside, aox4. Pt was admitted from [facility]. Pt has been a long term resident at [facility] for 2-3 years. Per Juan from [facility] admissions, pt will need auth or a denial letter prior to pt's return.

## 2024-03-19 LAB
-  AMOXICILLIN/CLAVULANIC ACID: SIGNIFICANT CHANGE UP
-  AMPICILLIN/SULBACTAM: SIGNIFICANT CHANGE UP
-  AMPICILLIN/SULBACTAM: SIGNIFICANT CHANGE UP
-  AMPICILLIN: SIGNIFICANT CHANGE UP
-  AMPICILLIN: SIGNIFICANT CHANGE UP
-  AZTREONAM: SIGNIFICANT CHANGE UP
-  AZTREONAM: SIGNIFICANT CHANGE UP
-  CEFAZOLIN: SIGNIFICANT CHANGE UP
-  CEFAZOLIN: SIGNIFICANT CHANGE UP
-  CEFEPIME: SIGNIFICANT CHANGE UP
-  CEFEPIME: SIGNIFICANT CHANGE UP
-  CEFTRIAXONE: SIGNIFICANT CHANGE UP
-  CEFTRIAXONE: SIGNIFICANT CHANGE UP
-  CEFUROXIME: SIGNIFICANT CHANGE UP
-  CIPROFLOXACIN: SIGNIFICANT CHANGE UP
-  CIPROFLOXACIN: SIGNIFICANT CHANGE UP
-  ERTAPENEM: SIGNIFICANT CHANGE UP
-  ERTAPENEM: SIGNIFICANT CHANGE UP
-  GENTAMICIN: SIGNIFICANT CHANGE UP
-  GENTAMICIN: SIGNIFICANT CHANGE UP
-  IMIPENEM: SIGNIFICANT CHANGE UP
-  IMIPENEM: SIGNIFICANT CHANGE UP
-  LEVOFLOXACIN: SIGNIFICANT CHANGE UP
-  LEVOFLOXACIN: SIGNIFICANT CHANGE UP
-  MEROPENEM: SIGNIFICANT CHANGE UP
-  MEROPENEM: SIGNIFICANT CHANGE UP
-  NITROFURANTOIN: SIGNIFICANT CHANGE UP
-  PIPERACILLIN/TAZOBACTAM: SIGNIFICANT CHANGE UP
-  PIPERACILLIN/TAZOBACTAM: SIGNIFICANT CHANGE UP
-  TOBRAMYCIN: SIGNIFICANT CHANGE UP
-  TOBRAMYCIN: SIGNIFICANT CHANGE UP
-  TRIMETHOPRIM/SULFAMETHOXAZOLE: SIGNIFICANT CHANGE UP
-  TRIMETHOPRIM/SULFAMETHOXAZOLE: SIGNIFICANT CHANGE UP
ANION GAP SERPL CALC-SCNC: 7 MMOL/L — SIGNIFICANT CHANGE UP (ref 5–17)
BUN SERPL-MCNC: 17 MG/DL — SIGNIFICANT CHANGE UP (ref 7–23)
CALCIUM SERPL-MCNC: 8.7 MG/DL — SIGNIFICANT CHANGE UP (ref 8.5–10.1)
CHLORIDE SERPL-SCNC: 109 MMOL/L — HIGH (ref 96–108)
CO2 SERPL-SCNC: 29 MMOL/L — SIGNIFICANT CHANGE UP (ref 22–31)
CREAT SERPL-MCNC: 1.2 MG/DL — SIGNIFICANT CHANGE UP (ref 0.5–1.3)
CULTURE RESULTS: ABNORMAL
EGFR: 71 ML/MIN/1.73M2 — SIGNIFICANT CHANGE UP
GLUCOSE BLDC GLUCOMTR-MCNC: 184 MG/DL — HIGH (ref 70–99)
GLUCOSE BLDC GLUCOMTR-MCNC: 205 MG/DL — HIGH (ref 70–99)
GLUCOSE BLDC GLUCOMTR-MCNC: 209 MG/DL — HIGH (ref 70–99)
GLUCOSE BLDC GLUCOMTR-MCNC: 210 MG/DL — HIGH (ref 70–99)
GLUCOSE SERPL-MCNC: 176 MG/DL — HIGH (ref 70–99)
HCT VFR BLD CALC: 36.3 % — LOW (ref 39–50)
HGB BLD-MCNC: 11.4 G/DL — LOW (ref 13–17)
MAGNESIUM SERPL-MCNC: 1.9 MG/DL — SIGNIFICANT CHANGE UP (ref 1.6–2.6)
MCHC RBC-ENTMCNC: 30.8 PG — SIGNIFICANT CHANGE UP (ref 27–34)
MCHC RBC-ENTMCNC: 31.4 GM/DL — LOW (ref 32–36)
MCV RBC AUTO: 98.1 FL — SIGNIFICANT CHANGE UP (ref 80–100)
METHOD TYPE: SIGNIFICANT CHANGE UP
METHOD TYPE: SIGNIFICANT CHANGE UP
NRBC # BLD: 0 /100 WBCS — SIGNIFICANT CHANGE UP (ref 0–0)
ORGANISM # SPEC MICROSCOPIC CNT: ABNORMAL
ORGANISM # SPEC MICROSCOPIC CNT: SIGNIFICANT CHANGE UP
ORGANISM # SPEC MICROSCOPIC CNT: SIGNIFICANT CHANGE UP
PHOSPHATE SERPL-MCNC: 3.1 MG/DL — SIGNIFICANT CHANGE UP (ref 2.5–4.5)
PLATELET # BLD AUTO: 186 K/UL — SIGNIFICANT CHANGE UP (ref 150–400)
POTASSIUM SERPL-MCNC: 3.5 MMOL/L — SIGNIFICANT CHANGE UP (ref 3.5–5.3)
POTASSIUM SERPL-SCNC: 3.5 MMOL/L — SIGNIFICANT CHANGE UP (ref 3.5–5.3)
RBC # BLD: 3.7 M/UL — LOW (ref 4.2–5.8)
RBC # FLD: 13.2 % — SIGNIFICANT CHANGE UP (ref 10.3–14.5)
SODIUM SERPL-SCNC: 145 MMOL/L — SIGNIFICANT CHANGE UP (ref 135–145)
SPECIMEN SOURCE: SIGNIFICANT CHANGE UP
WBC # BLD: 8.96 K/UL — SIGNIFICANT CHANGE UP (ref 3.8–10.5)
WBC # FLD AUTO: 8.96 K/UL — SIGNIFICANT CHANGE UP (ref 3.8–10.5)

## 2024-03-19 PROCEDURE — 93306 TTE W/DOPPLER COMPLETE: CPT | Mod: 26

## 2024-03-19 PROCEDURE — 99233 SBSQ HOSP IP/OBS HIGH 50: CPT | Mod: 25

## 2024-03-19 RX ORDER — DILTIAZEM HCL 120 MG
90 CAPSULE, EXT RELEASE 24 HR ORAL EVERY 6 HOURS
Refills: 0 | Status: DISCONTINUED | OUTPATIENT
Start: 2024-03-19 | End: 2024-03-22

## 2024-03-19 RX ADMIN — Medication 5 MILLIGRAM(S): at 17:42

## 2024-03-19 RX ADMIN — Medication 5 MILLIGRAM(S): at 13:28

## 2024-03-19 RX ADMIN — APIXABAN 5 MILLIGRAM(S): 2.5 TABLET, FILM COATED ORAL at 05:30

## 2024-03-19 RX ADMIN — INSULIN GLARGINE 7 UNIT(S): 100 INJECTION, SOLUTION SUBCUTANEOUS at 21:42

## 2024-03-19 RX ADMIN — SENNA PLUS 2 TABLET(S): 8.6 TABLET ORAL at 21:41

## 2024-03-19 RX ADMIN — GABAPENTIN 300 MILLIGRAM(S): 400 CAPSULE ORAL at 05:27

## 2024-03-19 RX ADMIN — Medication 90 MILLIGRAM(S): at 23:10

## 2024-03-19 RX ADMIN — Medication 40 MILLIGRAM(S): at 17:43

## 2024-03-19 RX ADMIN — Medication 15 MG/HR: at 05:31

## 2024-03-19 RX ADMIN — Medication 2: at 12:35

## 2024-03-19 RX ADMIN — OXYCODONE HYDROCHLORIDE 10 MILLIGRAM(S): 5 TABLET ORAL at 05:27

## 2024-03-19 RX ADMIN — Medication 25 MILLIGRAM(S): at 17:42

## 2024-03-19 RX ADMIN — Medication 1 GRAM(S): at 05:27

## 2024-03-19 RX ADMIN — Medication 90 MILLIGRAM(S): at 10:29

## 2024-03-19 RX ADMIN — POLYETHYLENE GLYCOL 3350 17 GRAM(S): 17 POWDER, FOR SOLUTION ORAL at 05:31

## 2024-03-19 RX ADMIN — Medication 1 TABLET(S): at 12:36

## 2024-03-19 RX ADMIN — LORATADINE 10 MILLIGRAM(S): 10 TABLET ORAL at 12:37

## 2024-03-19 RX ADMIN — Medication 1 GRAM(S): at 17:42

## 2024-03-19 RX ADMIN — Medication 25 MILLIGRAM(S): at 05:28

## 2024-03-19 RX ADMIN — APIXABAN 5 MILLIGRAM(S): 2.5 TABLET, FILM COATED ORAL at 17:43

## 2024-03-19 RX ADMIN — Medication 250 MILLIGRAM(S): at 17:42

## 2024-03-19 RX ADMIN — Medication 325 MILLIGRAM(S): at 05:28

## 2024-03-19 RX ADMIN — NALOXEGOL OXALATE 25 MILLIGRAM(S): 12.5 TABLET, FILM COATED ORAL at 12:36

## 2024-03-19 RX ADMIN — Medication 325 MILLIGRAM(S): at 17:41

## 2024-03-19 RX ADMIN — ERTAPENEM SODIUM 120 MILLIGRAM(S): 1 INJECTION, POWDER, LYOPHILIZED, FOR SOLUTION INTRAMUSCULAR; INTRAVENOUS at 21:44

## 2024-03-19 RX ADMIN — Medication 250 MICROGRAM(S): at 05:30

## 2024-03-19 RX ADMIN — Medication 1000 UNIT(S): at 12:37

## 2024-03-19 RX ADMIN — Medication 1: at 08:40

## 2024-03-19 RX ADMIN — Medication 10 MILLIGRAM(S): at 12:37

## 2024-03-19 RX ADMIN — Medication 5 MILLIGRAM(S): at 21:41

## 2024-03-19 RX ADMIN — TIOTROPIUM BROMIDE 2 PUFF(S): 18 CAPSULE ORAL; RESPIRATORY (INHALATION) at 05:31

## 2024-03-19 RX ADMIN — Medication 40 MILLIGRAM(S): at 05:27

## 2024-03-19 RX ADMIN — Medication 81 MILLIGRAM(S): at 12:41

## 2024-03-19 RX ADMIN — Medication 90 MILLIGRAM(S): at 17:43

## 2024-03-19 RX ADMIN — GABAPENTIN 300 MILLIGRAM(S): 400 CAPSULE ORAL at 17:37

## 2024-03-19 RX ADMIN — ATORVASTATIN CALCIUM 40 MILLIGRAM(S): 80 TABLET, FILM COATED ORAL at 21:41

## 2024-03-19 RX ADMIN — INSULIN GLARGINE 15 UNIT(S): 100 INJECTION, SOLUTION SUBCUTANEOUS at 08:41

## 2024-03-19 RX ADMIN — Medication 500 MILLIGRAM(S): at 12:36

## 2024-03-19 RX ADMIN — POLYETHYLENE GLYCOL 3350 17 GRAM(S): 17 POWDER, FOR SOLUTION ORAL at 17:50

## 2024-03-19 RX ADMIN — Medication 5 MILLIGRAM(S): at 05:28

## 2024-03-19 RX ADMIN — PANTOPRAZOLE SODIUM 40 MILLIGRAM(S): 20 TABLET, DELAYED RELEASE ORAL at 05:27

## 2024-03-19 RX ADMIN — Medication 2: at 17:24

## 2024-03-19 RX ADMIN — Medication 250 MILLIGRAM(S): at 05:27

## 2024-03-19 RX ADMIN — OXYCODONE HYDROCHLORIDE 10 MILLIGRAM(S): 5 TABLET ORAL at 17:49

## 2024-03-19 NOTE — PROGRESS NOTE ADULT - ASSESSMENT
53M with PMH of AF on Eliquis, indwelling Aguilera CAD s/p stents, CVA, DM, HTN, osteomyelitis s/p debridement, PE, sent from [facility] for elevated BP, SOB, and dislodged Aguilera, found to be febrile in ER, a/w sepsis and A fib RVR.     Sepsis, Afib RVR, CAD, HTN  - Known A fib, now -150s on admission   - Telemetry reviewed overnight Afib 70-90s   - S/p Cardizem 10mg IVP x 1  - On Cardizem, 15 mg/hr, can change to 60 mg Q6H  - S/p digoxin loaded ( digoxin 500 mcg IV + 250 mg PO) d/t hypotension and persistent AF with RVR in 140s-150s.   - Continue digoxin 250 mg PO daily   - Digoxin level: 1.5  - Continue Metoprolol 25 mg PO BID with hold parameters, can uptitrate if HR remains uncontrolled    - Continue Eliquis for AC, admits compliance    - SOB improving, continuing on O2 supplementation (wean as tolerated) and LE edema   - TTE (12/2023) normal LV & RV size and function EF 55-60%, indeterminate DD, mod dilated LA and RA, mod MR, mild TR, PASP 31  - Please obtain transthoracic echocardiogram to assess ventricular function, wall motion and valvular abnormalities  - BNP 7622  - Mild vol ol on examination with LE edema   - Creatinine:  <--1.20,  <--1.40,  <--1.20,  <--1.30  - Continue Lasix 40 mg IV BID    - EKG: A fib RVR @ 154  - Troponin: <-51.5, no trend there after   - No evidence of any active ischemia   - Continue aspirin and statin     - BP stable and controlled   - Continue to hold home Imdur and losartan to allow room for AV nodals     - Sepsis likely 2/2 UTI  - Management per primary team     - Monitor and replete lytes, keep K>4, Mg>2.  - Will continue to follow.    Danny Arce, MS FNP, AGACNP  Nurse Practitioner- Cardiology   Please call on TEAMS

## 2024-03-19 NOTE — PROGRESS NOTE ADULT - SUBJECTIVE AND OBJECTIVE BOX
[facility] DIV[facility] of INFECTIOUS DISEASE  Juventino Whitten MD PhD, Symone Hickey MD, Anne-Marie Li MD, Jeffery Jacobs MD, Ryan Romeo MD  and providing coverage with Melody Armstrong MD  Providing Infectious Disease Consultations at [facility], [facility], [facility]'s    Office# [phone] to schedule follow up appointments  Answering Service for urgent calls or New Consults [phone]  Cell# to text for urgent issues Juventino Whitten [phone]     infectious diseases progress note:    SARAH MORRISON is a 53y y. o. Male patient    Overnight and events of the last 24hrs reviewed    Allergies    No Known Drug Allergies  fish (Hives)    Intolerances        ANTIBIOTICS/RELEVANT:  antimicrobials  ertapenem  IVPB 1000 milliGRAM(s) IV Intermittent every 24 hours    immunologic:    OTHER:  acetaminophen     Tablet .. 650 milliGRAM(s) Oral every 6 hours PRN  albuterol    0.083% 2.5 milliGRAM(s) Nebulizer every 4 hours PRN  aluminum hydroxide/magnesium hydroxide/simethicone Suspension 30 milliLiter(s) Oral every 4 hours PRN  apixaban 5 milliGRAM(s) Oral every 12 hours  ascorbic acid 500 milliGRAM(s) Oral daily  aspirin  chewable 81 milliGRAM(s) Oral daily  atorvastatin 40 milliGRAM(s) Oral at bedtime  bisacodyl 10 milliGRAM(s) Oral daily  cholecalciferol 1000 Unit(s) Oral daily  dextrose 5%. 1000 milliLiter(s) IV Continuous <Continuous>  dextrose 5%. 1000 milliLiter(s) IV Continuous <Continuous>  dextrose 50% Injectable 25 Gram(s) IV Push once  dextrose 50% Injectable 12.5 Gram(s) IV Push once  dextrose 50% Injectable 25 Gram(s) IV Push once  dextrose Oral Gel 15 Gram(s) Oral once PRN  digoxin     Tablet 250 MICROGram(s) Oral daily  diltiazem    Tablet 90 milliGRAM(s) Oral every 6 hours  ferrous    sulfate 325 milliGRAM(s) Oral two times a day  furosemide   Injectable 40 milliGRAM(s) IV Push every 12 hours  gabapentin 300 milliGRAM(s) Oral every 12 hours  glucagon  Injectable 1 milliGRAM(s) IntraMuscular once  insulin glargine Injectable (LANTUS) 15 Unit(s) SubCutaneous every morning  insulin glargine Injectable (LANTUS) 7 Unit(s) SubCutaneous at bedtime  insulin lispro (ADMELOG) corrective regimen sliding scale   SubCutaneous three times a day before meals  insulin lispro (ADMELOG) corrective regimen sliding scale   SubCutaneous at bedtime  loratadine 10 milliGRAM(s) Oral daily  melatonin 5 milliGRAM(s) Oral at bedtime  metoclopramide 5 milliGRAM(s) Oral three times a day  metoprolol tartrate 25 milliGRAM(s) Oral two times a day  multivitamin/minerals 1 Tablet(s) Oral daily  naloxegol 25 milliGRAM(s) Oral daily  ondansetron Injectable 4 milliGRAM(s) IV Push every 6 hours PRN  oxybutynin 5 milliGRAM(s) Oral two times a day  oxyCODONE    IR 10 milliGRAM(s) Oral two times a day  pantoprazole    Tablet 40 milliGRAM(s) Oral before breakfast  polyethylene glycol 3350 17 Gram(s) Oral two times a day  saccharomyces boulardii 250 milliGRAM(s) Oral two times a day  senna 2 Tablet(s) Oral at bedtime  sodium chloride 0.65% Nasal 1 Spray(s) Both Nostrils daily  sucralfate 1 Gram(s) Oral two times a day  tiotropium 2.5 MICROgram(s) Inhaler 2 Puff(s) Inhalation daily      Objective:  Vital Signs Last 24 Hrs  T(C): 37.2 (19 Mar 2024 10:13), Max: 37.2 (19 Mar 2024 10:13)  T(F): 98.9 (19 Mar 2024 10:13), Max: 98.9 (19 Mar 2024 10:13)  HR: 89 (19 Mar 2024 10:13) (70 - 94)  BP: 114/70 (19 Mar 2024 10:13) (100/60 - 136/73)  BP(mean): --  RR: 17 (19 Mar 2024 10:13) (17 - 18)  SpO2: 92% (19 Mar 2024 10:13) (92% - 94%)    Parameters below as of 19 Mar 2024 10:13  Patient On (Oxygen Delivery Method): nasal cannula  O2 Flow (L/min): 2      T(C): 37.2 (03-19-24 @ 10:13), Max: 38.9 (03-17-24 @ 19:56)  T(C): 37.2 (03-19-24 @ 10:13), Max: 39.9 (03-16-24 @ 22:56)  T(C): 37.2 (03-19-24 @ 10:13), Max: 39.9 (03-16-24 @ 22:56)    PHYSICAL EXAM:  HEENT: NC atraumatic  Neck: supple  Respiratory: no accessory muscle use, breathing comfortably  Cardiovascular: distant  Gastrointestinal: normal appearing, nondistended  Extremities: no clubbing, no cyanosis,        LABS:                          11.4   8.96  )-----------( 186      ( 19 Mar 2024 08:08 )             36.3       WBC  8.96 03-19 @ 08:08  10.70 03-18 @ 06:50  19.30 03-17 @ 06:41  3.90 03-16 @ 23:00      03-19    145  |  109<H>  |  17  ----------------------------<  176<H>  3.5   |  29  |  1.20    Ca    8.7      19 Mar 2024 08:08  Phos  3.1     03-19  Mg     1.9     03-19        Creatinine: 1.20 mg/dL (03-19-24 @ 08:08)  Creatinine: 1.40 mg/dL (03-18-24 @ 06:50)  Creatinine: 1.20 mg/dL (03-17-24 @ 06:41)  Creatinine: 1.30 mg/dL (03-16-24 @ 23:00)        Urinalysis Basic - ( 19 Mar 2024 08:08 )    Color: x / Appearance: x / SG: x / pH: x  Gluc: 176 mg/dL / Ketone: x  / Bili: x / Urobili: x   Blood: x / Protein: x / Nitrite: x   Leuk Esterase: x / RBC: x / WBC x   Sq Epi: x / Non Sq Epi: x / Bacteria: x            INFLAMMATORY MARKERS      MICROBIOLOGY:    Culture - Urine (03.16.24 @ 22:35)    Specimen Source: Clean Catch Clean Catch (Midstream)   Culture Results:   >100,000 CFU/ml Escherichia coli        RADIOLOGY & ADDITIONAL STUDIES:

## 2024-03-19 NOTE — PROGRESS NOTE ADULT - SUBJECTIVE AND OBJECTIVE BOX
Date of Service: 03-19-24 @ 12:27    Patient is a 53y old  Male who presents with a chief complaint of fever (18 Mar 2024 12:57)      INTERVAL HPI/OVERNIGHT EVENTS: Patient seen and examined. NAD. No complaints.    Vital Signs Last 24 Hrs  T(C): 37.2 (19 Mar 2024 10:13), Max: 37.2 (19 Mar 2024 10:13)  T(F): 98.9 (19 Mar 2024 10:13), Max: 98.9 (19 Mar 2024 10:13)  HR: 89 (19 Mar 2024 10:13) (70 - 94)  BP: 114/70 (19 Mar 2024 10:13) (100/60 - 136/73)  BP(mean): --  RR: 17 (19 Mar 2024 10:13) (17 - 18)  SpO2: 92% (19 Mar 2024 10:13) (92% - 94%)    Parameters below as of 19 Mar 2024 10:13  Patient On (Oxygen Delivery Method): nasal cannula  O2 Flow (L/min): 2      03-19    145  |  109<H>  |  17  ----------------------------<  176<H>  3.5   |  29  |  1.20    Ca    8.7      19 Mar 2024 08:08  Phos  3.1     03-19  Mg     1.9     03-19                            11.4   8.96  )-----------( 186      ( 19 Mar 2024 08:08 )             36.3       CAPILLARY BLOOD GLUCOSE      POCT Blood Glucose.: 205 mg/dL (19 Mar 2024 12:18)  POCT Blood Glucose.: 184 mg/dL (19 Mar 2024 07:50)  POCT Blood Glucose.: 200 mg/dL (18 Mar 2024 21:02)  POCT Blood Glucose.: 172 mg/dL (18 Mar 2024 17:50)    Urinalysis Basic - ( 19 Mar 2024 08:08 )    Color: x / Appearance: x / SG: x / pH: x  Gluc: 176 mg/dL / Ketone: x  / Bili: x / Urobili: x   Blood: x / Protein: x / Nitrite: x   Leuk Esterase: x / RBC: x / WBC x   Sq Epi: x / Non Sq Epi: x / Bacteria: x    Culture - Blood (03.16.24 @ 22:40)   Gram Stain:   Growth in aerobic and anaerobic bottles: Gram Negative Rods  Specimen Source: .Blood Blood-Peripheral  Culture Results:   Growth in aerobic and anaerobic bottles: Escherichia coli ESBL   See previous culture [id number]    Culture - Blood (03.16.24 @ 22:35)   - ESBL: Detec  - Escherichia coli: Detec  - CTX-M Resistance Marker: Detec  Gram Stain:   Growth in aerobic bottle: Gram Negative Rods   Growth in anaerobic bottle: Gram Negative Rods  - Ampicillin: R >16 These ampicillin results predict results for amoxicillin  - Ampicillin/Sulbactam: R 8/4  - Aztreonam: R >16  - Cefazolin: R >16  - Cefepime: R >16  - Ceftriaxone: R >32  - Ciprofloxacin: R >2  - Ertapenem: S <=0.5  - Gentamicin: S <=2  - Imipenem: S <=1  - Levofloxacin: R >4  - Meropenem: S <=1  - Piperacillin/Tazobactam: R <=8  - Tobramycin: S <=2  - Trimethoprim/Sulfamethoxazole: S <=0.5/9.5  Specimen Source: .Blood Blood-Peripheral  Organism: Blood Culture PCR  Organism: Escherichia coli ESBL  Culture Results: Culture - Urine (03.16.24 @ 22:35)   Specimen Source: Clean Catch Clean Catch (Midstream)  Culture Results:   >100,000 CFU/ml Escherichia coli      Historical Values  Culture - Urine (03.16.24 @ 22:35)   Specimen Source: Clean Catch Clean Catch (Midstream)  Culture Results:   >100,000 CFU/ml Escherichia coli          acetaminophen     Tablet .. 650 milliGRAM(s) Oral every 6 hours PRN  albuterol    0.083% 2.5 milliGRAM(s) Nebulizer every 4 hours PRN  aluminum hydroxide/magnesium hydroxide/simethicone Suspension 30 milliLiter(s) Oral every 4 hours PRN  apixaban 5 milliGRAM(s) Oral every 12 hours  ascorbic acid 500 milliGRAM(s) Oral daily  aspirin  chewable 81 milliGRAM(s) Oral daily  atorvastatin 40 milliGRAM(s) Oral at bedtime  bisacodyl 10 milliGRAM(s) Oral daily  cholecalciferol 1000 Unit(s) Oral daily  dextrose 5%. 1000 milliLiter(s) IV Continuous <Continuous>  dextrose 5%. 1000 milliLiter(s) IV Continuous <Continuous>  dextrose 50% Injectable 25 Gram(s) IV Push once  dextrose 50% Injectable 12.5 Gram(s) IV Push once  dextrose 50% Injectable 25 Gram(s) IV Push once  dextrose Oral Gel 15 Gram(s) Oral once PRN  digoxin     Tablet 250 MICROGram(s) Oral daily  diltiazem    Tablet 90 milliGRAM(s) Oral every 6 hours  ertapenem  IVPB 1000 milliGRAM(s) IV Intermittent every 24 hours  ferrous    sulfate 325 milliGRAM(s) Oral two times a day  furosemide   Injectable 40 milliGRAM(s) IV Push every 12 hours  gabapentin 300 milliGRAM(s) Oral every 12 hours  glucagon  Injectable 1 milliGRAM(s) IntraMuscular once  insulin glargine Injectable (LANTUS) 15 Unit(s) SubCutaneous every morning  insulin glargine Injectable (LANTUS) 7 Unit(s) SubCutaneous at bedtime  insulin lispro (ADMELOG) corrective regimen sliding scale   SubCutaneous three times a day before meals  insulin lispro (ADMELOG) corrective regimen sliding scale   SubCutaneous at bedtime  loratadine 10 milliGRAM(s) Oral daily  melatonin 5 milliGRAM(s) Oral at bedtime  metoclopramide 5 milliGRAM(s) Oral three times a day  metoprolol tartrate 25 milliGRAM(s) Oral two times a day  multivitamin/minerals 1 Tablet(s) Oral daily  naloxegol 25 milliGRAM(s) Oral daily  ondansetron Injectable 4 milliGRAM(s) IV Push every 6 hours PRN  oxybutynin 5 milliGRAM(s) Oral two times a day  oxyCODONE    IR 10 milliGRAM(s) Oral two times a day  pantoprazole    Tablet 40 milliGRAM(s) Oral before breakfast  polyethylene glycol 3350 17 Gram(s) Oral two times a day  saccharomyces boulardii 250 milliGRAM(s) Oral two times a day  senna 2 Tablet(s) Oral at bedtime  sodium chloride 0.65% Nasal 1 Spray(s) Both Nostrils daily  sucralfate 1 Gram(s) Oral two times a day  tiotropium 2.5 MICROgram(s) Inhaler 2 Puff(s) Inhalation daily              REVIEW OF SYSTEMS:  CONSTITUTIONAL: No fever, no weight loss, or no fatigue  NECK: No pain, no stiffness  RESPIRATORY: No cough, no wheezing, no chills, no hemoptysis, No shortness of breath  CARDIOVASCULAR: No chest pain, no palpitations, no dizziness, no leg swelling  GASTROINTESTINAL: No abdominal pain. No nausea, no vomiting, no hematemesis; No diarrhea, no constipation. No melena, no hematochezia.  GENITOURINARY: No dysuria, no frequency, no hematuria, no incontinence  NEUROLOGICAL: No headaches, no loss of strength, no numbness, no tremors  SKIN: No itching, no burning  MUSCULOSKELETAL: No joint pain, no swelling; No muscle, no back, no extremity pain  PSYCHIATRIC: No depression, no mood swings,   HEME/LYMPH: No easy bruising, no bleeding gums  ALLERY AND IMMUNOLOGIC: No hives       Consultant(s) Notes Reviewed:  [X] YES  [ ] NO    PHYSICAL EXAM:  GENERAL: NAD  HEAD:  Atraumatic, Normocephalic  EYES: EOMI, PERRLA, conjunctiva and sclera clear  ENMT: No tonsillar erythema, exudates, or enlargement; Moist mucous membranes  NECK: Supple, No JVD  NERVOUS SYSTEM:  Awake & alert  CHEST/LUNG: Clear to auscultation bilaterally; No rales, rhonchi, wheezing,  HEART: Regular rate and rhythm  ABDOMEN: Soft, Nontender, Nondistended; Bowel sounds present  EXTREMITIES:  No clubbing, cyanosis, or edema  LYMPH: No lymphadenopathy noted  SKIN: No rashes      Advanced care planning discussed with patient/family [X] YES   [ ] NO    Advanced care planning discussed with patient/family. Patient's health status was discussed. All appropriate changes have been made regarding patient's end-of-life care. Advanced care planning forms reviewed/discussed/completed.  20 minutes spent.

## 2024-03-19 NOTE — PROGRESS NOTE ADULT - PROBLEM SELECTOR PLAN 3
2/2 acute on chronic diastolic HF (HFpEF)  Continue iv lasix 40mg q12h  Nebs  Oxygen prn  Pulmonary/cardio f/u  Further work-up/management pending clinical course.

## 2024-03-19 NOTE — PROGRESS NOTE ADULT - ASSESSMENT
53M with PMH of AF on Eliquis, indwelling jacques, CAD s/p stents, CVA, DM, HTN, osteomyelitis s/p debridement, PE, sent from [facility] for elevated BP, SOB, and dislodged jacques    AF  Emphysema  Atelectasis  CAD  CVA  DM  HTN  OP  OA  hx of OM  hx of PE    on abx  fio2 titration  diuresis in progress  vs noted    ct neg for pe - atelectasis - emphysema  spiriva - nebs prn - VBG  cardio eval in progress  cvs rx regimen  proBNP noted to be elevated  diuresis - as per cardio recs  TTE reviewed  AF management  rate and rhythm control  GERARDO eval as outpatient  monitored unit admission  pulse ox monitoring  goal sat > 88 pct  DM care  serial FS  OLD records reviewed

## 2024-03-19 NOTE — PROGRESS NOTE ADULT - SUBJECTIVE AND OBJECTIVE BOX
Date/Time Patient Seen:  		  Referring MD:   Data Reviewed	       Patient is a 53y old  Male who presents with a chief complaint of fever (18 Mar 2024 12:57)      Subjective/HPI     PAST MEDICAL & SURGICAL HISTORY:  No pertinent past medical history    Diabetes    No pertinent past medical history    Diabetes mellitus with no complication    Afib    Hypertension    BPH (benign prostatic hyperplasia)    Perforated gastric ulcer  s/p emergent ex-lap omentopexy and plication 6/2019    Pulmonary embolism    History of non-ST elevation myocardial infarction (NSTEMI)    Osteomyelitis  s/p debridement    CAD S/P percutaneous coronary angioplasty    Cerebrovascular accident    No significant past surgical history    No significant past surgical history    H/O abdominal surgery    Perforated gastric ulcer    Traumatic amputation of left foot, initial encounter          Medication list         MEDICATIONS  (STANDING):  apixaban 5 milliGRAM(s) Oral every 12 hours  ascorbic acid 500 milliGRAM(s) Oral daily  aspirin  chewable 81 milliGRAM(s) Oral daily  atorvastatin 40 milliGRAM(s) Oral at bedtime  bisacodyl 10 milliGRAM(s) Oral daily  cholecalciferol 1000 Unit(s) Oral daily  dextrose 5%. 1000 milliLiter(s) (100 mL/Hr) IV Continuous <Continuous>  dextrose 5%. 1000 milliLiter(s) (50 mL/Hr) IV Continuous <Continuous>  dextrose 50% Injectable 25 Gram(s) IV Push once  dextrose 50% Injectable 12.5 Gram(s) IV Push once  dextrose 50% Injectable 25 Gram(s) IV Push once  digoxin     Tablet 250 MICROGram(s) Oral daily  diltiazem Infusion 15 mG/Hr (15 mL/Hr) IV Continuous <Continuous>  ertapenem  IVPB 1000 milliGRAM(s) IV Intermittent every 24 hours  ferrous    sulfate 325 milliGRAM(s) Oral two times a day  furosemide   Injectable 40 milliGRAM(s) IV Push every 12 hours  gabapentin 300 milliGRAM(s) Oral every 12 hours  glucagon  Injectable 1 milliGRAM(s) IntraMuscular once  insulin glargine Injectable (LANTUS) 15 Unit(s) SubCutaneous every morning  insulin glargine Injectable (LANTUS) 7 Unit(s) SubCutaneous at bedtime  insulin lispro (ADMELOG) corrective regimen sliding scale   SubCutaneous three times a day before meals  insulin lispro (ADMELOG) corrective regimen sliding scale   SubCutaneous at bedtime  loratadine 10 milliGRAM(s) Oral daily  melatonin 5 milliGRAM(s) Oral at bedtime  metoclopramide 5 milliGRAM(s) Oral three times a day  metoprolol tartrate 25 milliGRAM(s) Oral two times a day  multivitamin/minerals 1 Tablet(s) Oral daily  naloxegol 25 milliGRAM(s) Oral daily  oxybutynin 5 milliGRAM(s) Oral two times a day  oxyCODONE    IR 10 milliGRAM(s) Oral two times a day  pantoprazole    Tablet 40 milliGRAM(s) Oral before breakfast  polyethylene glycol 3350 17 Gram(s) Oral two times a day  saccharomyces boulardii 250 milliGRAM(s) Oral two times a day  senna 2 Tablet(s) Oral at bedtime  sodium chloride 0.65% Nasal 1 Spray(s) Both Nostrils daily  sucralfate 1 Gram(s) Oral two times a day  tiotropium 2.5 MICROgram(s) Inhaler 2 Puff(s) Inhalation daily    MEDICATIONS  (PRN):  acetaminophen     Tablet .. 650 milliGRAM(s) Oral every 6 hours PRN Temp greater or equal to 38C (100.4F), Mild Pain (1 - 3)  albuterol    0.083% 2.5 milliGRAM(s) Nebulizer every 4 hours PRN Shortness of Breath and/or Wheezing  aluminum hydroxide/magnesium hydroxide/simethicone Suspension 30 milliLiter(s) Oral every 4 hours PRN Dyspepsia  dextrose Oral Gel 15 Gram(s) Oral once PRN Blood Glucose LESS THAN 70 milliGRAM(s)/deciliter  ondansetron Injectable 4 milliGRAM(s) IV Push every 6 hours PRN Nausea and/or Vomiting         Vitals log        ICU Vital Signs Last 24 Hrs  T(C): 36.8 (19 Mar 2024 01:04), Max: 36.8 (18 Mar 2024 11:57)  T(F): 98.2 (19 Mar 2024 01:04), Max: 98.3 (18 Mar 2024 11:57)  HR: 70 (19 Mar 2024 01:04) (70 - 94)  BP: 106/65 (19 Mar 2024 01:04) (100/60 - 120/74)  BP(mean): --  ABP: --  ABP(mean): --  RR: 18 (19 Mar 2024 01:04) (17 - 18)  SpO2: 92% (19 Mar 2024 01:04) (92% - 94%)    O2 Parameters below as of 19 Mar 2024 01:04  Patient On (Oxygen Delivery Method): nasal cannula  O2 Flow (L/min): 2               Input and Output:  I&O's Detail    17 Mar 2024 07:01  -  18 Mar 2024 07:00  --------------------------------------------------------  IN:  Total IN: 0 mL    OUT:    Voided (mL): 800 mL  Total OUT: 800 mL    Total NET: -800 mL      18 Mar 2024 07:01  -  19 Mar 2024 05:10  --------------------------------------------------------  IN:    Oral Fluid: 600 mL  Total IN: 600 mL    OUT:  Total OUT: 0 mL    Total NET: 600 mL          Lab Data                        11.4   10.70 )-----------( 173      ( 18 Mar 2024 06:50 )             35.6     03-18    145  |  111<H>  |  19  ----------------------------<  116<H>  3.7   |  27  |  1.40<H>    Ca    8.2<L>      18 Mar 2024 06:50  Mg     2.2     03-18    TPro  6.5  /  Alb  2.5<L>  /  TBili  0.5  /  DBili  x   /  AST  17  /  ALT  19  /  AlkPhos  86  03-17            Review of Systems	      Objective     Physical Examination    heart s1s2  lung dc BS  head nc      Pertinent Lab findings & Imaging      Ale:  NO   Adequate UO     I&O's Detail    17 Mar 2024 07:01  -  18 Mar 2024 07:00  --------------------------------------------------------  IN:  Total IN: 0 mL    OUT:    Voided (mL): 800 mL  Total OUT: 800 mL    Total NET: -800 mL      18 Mar 2024 07:01  -  19 Mar 2024 05:10  --------------------------------------------------------  IN:    Oral Fluid: 600 mL  Total IN: 600 mL    OUT:  Total OUT: 0 mL    Total NET: 600 mL               Discussed with:     Cultures:	        Radiology

## 2024-03-19 NOTE — PROGRESS NOTE ADULT - SUBJECTIVE AND OBJECTIVE BOX
[facility] -- Brittany Lutz Pannella, Patel, Savella, Goodger, Cohen: Office # [phone]    Follow Up: Afib w/ RVR, CAD    Subjective/Observations: Pt seen and examined, awake, alert, resting in bed, denies chest pain, dyspnea, palpitations or dizziness, orthopnea and PND. on O2 via NC. Continuing on Cardizem gtt. Continues with LE edema.     REVIEW OF SYSTEMS: All other review of systems are negative unless indicated above    PAST MEDICAL & SURGICAL HISTORY:  Diabetes      Diabetes mellitus with no complication      Afib      Hypertension      Hypertension      BPH (benign prostatic hyperplasia)      Perforated gastric ulcer  s/p emergent ex-lap omentopexy and plication 6/2019      Pulmonary embolism      History of non-ST elevation myocardial infarction (NSTEMI)      Osteomyelitis  s/p debridement      CAD S/P percutaneous coronary angioplasty      Cerebrovascular accident      H/O abdominal surgery      Perforated gastric ulcer      Traumatic amputation of left foot, initial encounter    MEDICATIONS  (STANDING):  apixaban 5 milliGRAM(s) Oral every 12 hours  ascorbic acid 500 milliGRAM(s) Oral daily  aspirin  chewable 81 milliGRAM(s) Oral daily  atorvastatin 40 milliGRAM(s) Oral at bedtime  bisacodyl 10 milliGRAM(s) Oral daily  cholecalciferol 1000 Unit(s) Oral daily  dextrose 5%. 1000 milliLiter(s) (100 mL/Hr) IV Continuous <Continuous>  dextrose 5%. 1000 milliLiter(s) (50 mL/Hr) IV Continuous <Continuous>  dextrose 50% Injectable 25 Gram(s) IV Push once  dextrose 50% Injectable 12.5 Gram(s) IV Push once  dextrose 50% Injectable 25 Gram(s) IV Push once  digoxin     Tablet 250 MICROGram(s) Oral daily  diltiazem Infusion 15 mG/Hr (15 mL/Hr) IV Continuous <Continuous>  ertapenem  IVPB 1000 milliGRAM(s) IV Intermittent every 24 hours  ferrous    sulfate 325 milliGRAM(s) Oral two times a day  furosemide   Injectable 40 milliGRAM(s) IV Push every 12 hours  gabapentin 300 milliGRAM(s) Oral every 12 hours  glucagon  Injectable 1 milliGRAM(s) IntraMuscular once  insulin glargine Injectable (LANTUS) 15 Unit(s) SubCutaneous every morning  insulin glargine Injectable (LANTUS) 7 Unit(s) SubCutaneous at bedtime  insulin lispro (ADMELOG) corrective regimen sliding scale   SubCutaneous three times a day before meals  insulin lispro (ADMELOG) corrective regimen sliding scale   SubCutaneous at bedtime  loratadine 10 milliGRAM(s) Oral daily  melatonin 5 milliGRAM(s) Oral at bedtime  metoclopramide 5 milliGRAM(s) Oral three times a day  metoprolol tartrate 25 milliGRAM(s) Oral two times a day  multivitamin/minerals 1 Tablet(s) Oral daily  naloxegol 25 milliGRAM(s) Oral daily  oxybutynin 5 milliGRAM(s) Oral two times a day  oxyCODONE    IR 10 milliGRAM(s) Oral two times a day  pantoprazole    Tablet 40 milliGRAM(s) Oral before breakfast  polyethylene glycol 3350 17 Gram(s) Oral two times a day  saccharomyces boulardii 250 milliGRAM(s) Oral two times a day  senna 2 Tablet(s) Oral at bedtime  sodium chloride 0.65% Nasal 1 Spray(s) Both Nostrils daily  sucralfate 1 Gram(s) Oral two times a day  tiotropium 2.5 MICROgram(s) Inhaler 2 Puff(s) Inhalation daily    MEDICATIONS  (PRN):  acetaminophen     Tablet .. 650 milliGRAM(s) Oral every 6 hours PRN Temp greater or equal to 38C (100.4F), Mild Pain (1 - 3)  albuterol    0.083% 2.5 milliGRAM(s) Nebulizer every 4 hours PRN Shortness of Breath and/or Wheezing  aluminum hydroxide/magnesium hydroxide/simethicone Suspension 30 milliLiter(s) Oral every 4 hours PRN Dyspepsia  dextrose Oral Gel 15 Gram(s) Oral once PRN Blood Glucose LESS THAN 70 milliGRAM(s)/deciliter  ondansetron Injectable 4 milliGRAM(s) IV Push every 6 hours PRN Nausea and/or Vomiting    Allergies  No Known Drug Allergies  fish (Hives)    Vital Signs Last 24 Hrs  T(C): 36.4 (19 Mar 2024 05:13), Max: 36.8 (18 Mar 2024 11:57)  T(F): 97.6 (19 Mar 2024 05:13), Max: 98.3 (18 Mar 2024 11:57)  HR: 90 (19 Mar 2024 05:13) (70 - 94)  BP: 136/73 (19 Mar 2024 05:13) (100/60 - 136/73)  BP(mean): --  RR: 18 (19 Mar 2024 05:13) (17 - 18)  SpO2: 93% (19 Mar 2024 05:13) (92% - 94%)    Parameters below as of 19 Mar 2024 05:13  Patient On (Oxygen Delivery Method): nasal cannula      I&O's Summary    18 Mar 2024 07:01  -  19 Mar 2024 07:00  --------------------------------------------------------  IN: 600 mL / OUT: 2500 mL / NET: -1900 mL          TELE: A fib 70-90s   PHYSICAL EXAM:  Constitutional: NAD, awake and alert  HEENT: Moist Mucous Membranes, Anicteric  Pulmonary: Non-labored, breath sounds are clear bilaterally, No wheezing, rales or rhonchi  Cardiovascular: Regular, S1 and S2, No murmurs, No rubs, gallops or clicks  Gastrointestinal:  soft, nontender, nondistended   Lymph: +2-3 peripheral edema Lt> rt. No lymphadenopathy.   Skin: No visible rashes or ulcers.  Psych:  Mood & affect appropriate      LABS: All Labs Reviewed:                        11.4   8.96  )-----------( 186      ( 19 Mar 2024 08:08 )             36.3                         11.4   10.70 )-----------( 173      ( 18 Mar 2024 06:50 )             35.6                         12.0   19.30 )-----------( 188      ( 17 Mar 2024 06:41 )             37.4     19 Mar 2024 08:08    145    |  109    |  17     ----------------------------<  176    3.5     |  29     |  1.20   18 Mar 2024 06:50    145    |  111    |  19     ----------------------------<  116    3.7     |  27     |  1.40   17 Mar 2024 06:41    145    |  110    |  14     ----------------------------<  119    4.0     |  26     |  1.20     Ca    8.7        19 Mar 2024 08:08  Ca    8.2        18 Mar 2024 06:50  Ca    8.1        17 Mar 2024 06:41  Phos  3.1       19 Mar 2024 08:08  Mg     1.9       19 Mar 2024 08:08  Mg     2.2       18 Mar 2024 06:50  Mg     1.5       17 Mar 2024 06:41    TPro  6.5    /  Alb  2.5    /  TBili  0.5    /  DBili  x      /  AST  17     /  ALT  19     /  AlkPhos  86     17 Mar 2024 06:41  TPro  7.6    /  Alb  2.8    /  TBili  0.6    /  DBili  x      /  AST  38     /  ALT  20     /  AlkPhos  103    16 Mar 2024 23:00     Troponin I, High Sensitivity Result: 51.5 ng/L (03-16-24 @ 23:00)  Triiodothyronine, Total (T3 Total): 85 ng/dL (03-17-24 @ 06:40)  Lactate, Blood: 0.9 mmol/L (03-18-24 @ 06:50)  Lactate, Blood: 1.8 mmol/L (03-17-24 @ 06:41)  Lactate, Blood: 3.7 mmol/L (03-17-24 @ 01:32)    12 Lead ECG:   Ventricular Rate 154 BPM    QRS Duration 76 ms    Q-T Interval 282 ms    QTC Calculation(Bazett) 451 ms    R Axis 15 degrees    T Axis 194 degrees    Diagnosis Line *** Critical Test Result: High HR  Atrial fibrillation with rapid ventricular response  Low voltage QRS  Nonspecific ST and T wave abnormality    Confirmed by JOHN KU ([id number]) on 3/17/2024 7:30:13 AM (03-16-24 @ 23:50)      TRANSTHORACIC ECHOCARDIOGRAM REPORT  ________________________________________________________________________________                                      _______       Pt. Name:       SARAH MORRISON Study Date:    12/23/2023  MRN:            [id number]         YOB: 1970  Accession #:    [id number]        Age:           53 years  Account#:       [id number]       Gender:        M  Heart Rate:                      Height:        74.02 in (188.00 cm)  Rhythm:Weight:        255.73 lb (116.00 kg)  Blood Pressure: 102/58 mmHg      BSA/BMI:       2.41 m² / 32.82 kg/m²  ________________________________________________________________________________________  Referring Physician:    [id number] Hill Brizuela  Interpreting Physician: Jeovany France  Primary Sonographer:    Heidi Redd [facility]    CPT:               ECHO TTE WO CON COMP W DOPP - 86446.m  Indication(s):     Abnormal electrocardiogram ECG/EKG - R94.31  Procedure:         Transthoracic echocardiogram with 2-D, M-mode and complete                     spectral and color flow Doppler.  Ordering Location: [facility]    _______________________________________________________________________________________     CONCLUSIONS:      1. Left ventricular systolic function is normal with an ejection fraction visually estimated at 55 to 60 %.   2. Moderate left ventricular hypertrophy.   3. The left ventricular diastolic function is indeterminate.   4. Normal right ventricular cavity size, wall thickness, and systolic function.   5. The left atrium is moderately dilated.   6. The right atrium is moderately dilated in size.   7. There is calcification of the mitral valve annulus.   8. Moderate mitral regurgitation.   9. Calcification of the aortic valve leaflets.  10. Mild tricuspid regurgitation.  11. Estimated pulmonary artery systolic pressure is 31 mmHg, consistent with normal pulmonary artery pressure.  12. Trace pericardial effusion.    ________________________________________________________________________________________  FINDINGS:     Left Ventricle:  Left ventricular systolic function is normal with an ejection fraction visually estimated at 55 to 60%. There are no regional wall motion abnormalities seen. The left ventricular diastolicfunction is indeterminate. Moderate left ventricular hypertrophy.     Right Ventricle:  The right ventricular cavity is normal in size, normal wall thickness and normal systolic function.     Left Atrium:  The left atrium is moderately dilated with an indexed volume of 47.25 ml/m².     Right Atrium:  The right atrium is moderately dilated in size with an indexed volume of 35.48 ml/m².     Aortic Valve:  The aortic valve appears trileaflet. There is calcification of the aortic valve leaflets. There is no aortic valve stenosis. There is no evidence of aortic regurgitation.     Mitral Valve:  There is mitral valve thickening of the anterior and posterior leaflets. There is calcification of the mitral valve annulus. There is moderate mitral regurgitation.     Tricuspid Valve:  Structurally normal tricuspid valve with normal leaflet excursion. There is mild tricuspid regurgitation. Estimated pulmonary artery systolic pressure is 31 mmHg, consistent with normal pulmonary artery pressure.     Pulmonic Valve:  Structurally normal pulmonic valve with normal leaflet excursion.     Aorta:  The aortic root at the sinuses of Valsalva is normal in size, measuring 3.60 cm (indexed 1.49 cm/m²). The aortic arch diameter is normal in size, measuring 2.3 cm (indexed 0.95 cm/m²).     Pericardium:  There is a trace pericardial effusion.     Systemic Veins:  The inferior vena cava is normal in size measuring 1.91 cm in diameter, (normal <2.1cm) with normal inspiratory collapse (normal >50%) consistent with normal right atrial pressure (~3, range 0-5mmHg).  ____________________________________________________________________  QUANTITATIVE DATA:  Left Ventricle Measurements: (Indexed to BSA)     IVSd (2D):   1.4 cm  LVPWd (2D):  1.4 cm  LVIDd (2D):  4.8 cm  LVIDs (2D):  3.3 cm  LV Mass:     283 g  117.1 g/m²  Visualized LV EF%: 55 to 60%     MV E Vmax:    1.49 m/s  e' lateral:   11.90 cm/s  e' medial:    8.38 cm/s  E/e' lateral: 12.52  E/e' medial:  17.78  E/e' Average: 14.69  MV DT:        102 msec    Aorta Measurements: (normal range) (Indexed to BSA)     Sinuses of Valsalva: 3.60 cm (3.1 - 3.7 cm)  Ao Arch:             2.3 cm       Left Atrium Measurements: (Indexed to BSA)  LA Diam 2D: 5.00 cm    Right Atrial Measurements:     RA Vol:  85.60 ml  RA Vol Index: 35.48 ml/m²    Mitral Valve Measurements:     MV E Vmax: 1.5 m/s         MR Vmax:          4.64 m/s                             MR Peak Gradient: 86.1 mmHg       Tricuspid Valve Measurements:     TR Vmax:          2.7 m/s  TR Peak Gradient: 28.3 mmHg  RA Pressure:      3 mmHg  PASP:             31 mmHg    ________________________________________________________________________________________  Electronically signed on 12/23/2023 at 3:54:22 PM by Jeovany France         *** Final ***

## 2024-03-19 NOTE — PROGRESS NOTE ADULT - ASSESSMENT
54 y/o M PMhx AF on Eliquis, chronic jacques, CAD s/p stents, CVA, DM, HTN, PE who presented w/ SOB, and dislodged jacques as well as dysuria.  also found to have Afib w/ RVR  admitted with  concerns for sepsis, CAUTI, dislodged jacques  reported dysuria  jacques placed in ED  CT abd/pelvis- No hydronephrosis or nephrolithiasis.    Recommendations  1-Bacteremia ESBL E coli (suspected pyelonephritis)   -  Escherichia coli: Detec   -  ESBL: Detec   -  CTX-M Resistance Marker: Detec  ertapenem started 3/17 continue for now,   Repeat blood cultures collected 3/18 5am -NGTD if these remain NGTD past 48 hours can look at placement of midline and ertapenem x 10 days completed in outpt setting  Culture - Urine (03.16.24 @ 22:35)  >100,000 CFU/ml Escherichia coli    Thank you for consulting us and involving us in the management of this most interesting and challenging case.  We will follow along in the care of this patient. Please call us at [phone] or text me directly on my cell# at [phone] with any concerns.

## 2024-03-20 DIAGNOSIS — L89.610 PRESSURE ULCER OF RIGHT HEEL, UNSTAGEABLE: ICD-10-CM

## 2024-03-20 LAB
ANION GAP SERPL CALC-SCNC: 7 MMOL/L — SIGNIFICANT CHANGE UP (ref 5–17)
BUN SERPL-MCNC: 17 MG/DL — SIGNIFICANT CHANGE UP (ref 7–23)
CALCIUM SERPL-MCNC: 9.1 MG/DL — SIGNIFICANT CHANGE UP (ref 8.5–10.1)
CHLORIDE SERPL-SCNC: 107 MMOL/L — SIGNIFICANT CHANGE UP (ref 96–108)
CO2 SERPL-SCNC: 32 MMOL/L — HIGH (ref 22–31)
CREAT SERPL-MCNC: 1.2 MG/DL — SIGNIFICANT CHANGE UP (ref 0.5–1.3)
EGFR: 71 ML/MIN/1.73M2 — SIGNIFICANT CHANGE UP
GLUCOSE BLDC GLUCOMTR-MCNC: 176 MG/DL — HIGH (ref 70–99)
GLUCOSE BLDC GLUCOMTR-MCNC: 178 MG/DL — HIGH (ref 70–99)
GLUCOSE BLDC GLUCOMTR-MCNC: 211 MG/DL — HIGH (ref 70–99)
GLUCOSE BLDC GLUCOMTR-MCNC: 230 MG/DL — HIGH (ref 70–99)
GLUCOSE SERPL-MCNC: 188 MG/DL — HIGH (ref 70–99)
HCT VFR BLD CALC: 37.9 % — LOW (ref 39–50)
HGB BLD-MCNC: 12.1 G/DL — LOW (ref 13–17)
MAGNESIUM SERPL-MCNC: 1.7 MG/DL — SIGNIFICANT CHANGE UP (ref 1.6–2.6)
MCHC RBC-ENTMCNC: 30.4 PG — SIGNIFICANT CHANGE UP (ref 27–34)
MCHC RBC-ENTMCNC: 31.9 GM/DL — LOW (ref 32–36)
MCV RBC AUTO: 95.2 FL — SIGNIFICANT CHANGE UP (ref 80–100)
NRBC # BLD: 0 /100 WBCS — SIGNIFICANT CHANGE UP (ref 0–0)
PLATELET # BLD AUTO: 194 K/UL — SIGNIFICANT CHANGE UP (ref 150–400)
POTASSIUM SERPL-MCNC: 3.3 MMOL/L — LOW (ref 3.5–5.3)
POTASSIUM SERPL-SCNC: 3.3 MMOL/L — LOW (ref 3.5–5.3)
RBC # BLD: 3.98 M/UL — LOW (ref 4.2–5.8)
RBC # FLD: 12.7 % — SIGNIFICANT CHANGE UP (ref 10.3–14.5)
SODIUM SERPL-SCNC: 146 MMOL/L — HIGH (ref 135–145)
WBC # BLD: 7.98 K/UL — SIGNIFICANT CHANGE UP (ref 3.8–10.5)
WBC # FLD AUTO: 7.98 K/UL — SIGNIFICANT CHANGE UP (ref 3.8–10.5)

## 2024-03-20 PROCEDURE — 73630 X-RAY EXAM OF FOOT: CPT | Mod: 26,50

## 2024-03-20 PROCEDURE — 99233 SBSQ HOSP IP/OBS HIGH 50: CPT

## 2024-03-20 PROCEDURE — 99222 1ST HOSP IP/OBS MODERATE 55: CPT

## 2024-03-20 PROCEDURE — 99221 1ST HOSP IP/OBS SF/LOW 40: CPT

## 2024-03-20 RX ORDER — POTASSIUM CHLORIDE 20 MEQ
40 PACKET (EA) ORAL ONCE
Refills: 0 | Status: COMPLETED | OUTPATIENT
Start: 2024-03-20 | End: 2024-03-20

## 2024-03-20 RX ORDER — INSULIN LISPRO 100/ML
VIAL (ML) SUBCUTANEOUS
Refills: 0 | Status: DISCONTINUED | OUTPATIENT
Start: 2024-03-20 | End: 2024-03-26

## 2024-03-20 RX ORDER — INSULIN LISPRO 100/ML
VIAL (ML) SUBCUTANEOUS AT BEDTIME
Refills: 0 | Status: DISCONTINUED | OUTPATIENT
Start: 2024-03-20 | End: 2024-03-26

## 2024-03-20 RX ADMIN — Medication 90 MILLIGRAM(S): at 18:10

## 2024-03-20 RX ADMIN — APIXABAN 5 MILLIGRAM(S): 2.5 TABLET, FILM COATED ORAL at 18:10

## 2024-03-20 RX ADMIN — Medication 5 MILLIGRAM(S): at 05:10

## 2024-03-20 RX ADMIN — Medication 5 MILLIGRAM(S): at 05:06

## 2024-03-20 RX ADMIN — Medication 250 MILLIGRAM(S): at 19:19

## 2024-03-20 RX ADMIN — POLYETHYLENE GLYCOL 3350 17 GRAM(S): 17 POWDER, FOR SOLUTION ORAL at 18:21

## 2024-03-20 RX ADMIN — TIOTROPIUM BROMIDE 2 PUFF(S): 18 CAPSULE ORAL; RESPIRATORY (INHALATION) at 06:48

## 2024-03-20 RX ADMIN — INSULIN GLARGINE 7 UNIT(S): 100 INJECTION, SOLUTION SUBCUTANEOUS at 21:52

## 2024-03-20 RX ADMIN — Medication 325 MILLIGRAM(S): at 18:11

## 2024-03-20 RX ADMIN — OXYCODONE HYDROCHLORIDE 10 MILLIGRAM(S): 5 TABLET ORAL at 05:10

## 2024-03-20 RX ADMIN — SENNA PLUS 2 TABLET(S): 8.6 TABLET ORAL at 21:51

## 2024-03-20 RX ADMIN — Medication 40 MILLIGRAM(S): at 18:10

## 2024-03-20 RX ADMIN — ERTAPENEM SODIUM 120 MILLIGRAM(S): 1 INJECTION, POWDER, LYOPHILIZED, FOR SOLUTION INTRAMUSCULAR; INTRAVENOUS at 21:51

## 2024-03-20 RX ADMIN — Medication 5 MILLIGRAM(S): at 14:09

## 2024-03-20 RX ADMIN — Medication 2: at 18:09

## 2024-03-20 RX ADMIN — Medication 4: at 12:17

## 2024-03-20 RX ADMIN — LORATADINE 10 MILLIGRAM(S): 10 TABLET ORAL at 12:08

## 2024-03-20 RX ADMIN — PANTOPRAZOLE SODIUM 40 MILLIGRAM(S): 20 TABLET, DELAYED RELEASE ORAL at 05:10

## 2024-03-20 RX ADMIN — Medication 5 MILLIGRAM(S): at 18:11

## 2024-03-20 RX ADMIN — Medication 10 MILLIGRAM(S): at 12:08

## 2024-03-20 RX ADMIN — Medication 500 MILLIGRAM(S): at 12:08

## 2024-03-20 RX ADMIN — GABAPENTIN 300 MILLIGRAM(S): 400 CAPSULE ORAL at 05:09

## 2024-03-20 RX ADMIN — Medication 325 MILLIGRAM(S): at 05:05

## 2024-03-20 RX ADMIN — Medication 90 MILLIGRAM(S): at 12:08

## 2024-03-20 RX ADMIN — Medication 1 TABLET(S): at 12:08

## 2024-03-20 RX ADMIN — Medication 40 MILLIEQUIVALENT(S): at 12:07

## 2024-03-20 RX ADMIN — Medication 25 MILLIGRAM(S): at 05:11

## 2024-03-20 RX ADMIN — INSULIN GLARGINE 15 UNIT(S): 100 INJECTION, SOLUTION SUBCUTANEOUS at 08:20

## 2024-03-20 RX ADMIN — Medication 250 MILLIGRAM(S): at 05:10

## 2024-03-20 RX ADMIN — NALOXEGOL OXALATE 25 MILLIGRAM(S): 12.5 TABLET, FILM COATED ORAL at 14:09

## 2024-03-20 RX ADMIN — ATORVASTATIN CALCIUM 40 MILLIGRAM(S): 80 TABLET, FILM COATED ORAL at 21:51

## 2024-03-20 RX ADMIN — Medication 25 MILLIGRAM(S): at 18:11

## 2024-03-20 RX ADMIN — Medication 90 MILLIGRAM(S): at 05:05

## 2024-03-20 RX ADMIN — OXYCODONE HYDROCHLORIDE 10 MILLIGRAM(S): 5 TABLET ORAL at 18:11

## 2024-03-20 RX ADMIN — Medication 1000 UNIT(S): at 12:07

## 2024-03-20 RX ADMIN — Medication 1 GRAM(S): at 05:06

## 2024-03-20 RX ADMIN — Medication 5 MILLIGRAM(S): at 21:52

## 2024-03-20 RX ADMIN — POLYETHYLENE GLYCOL 3350 17 GRAM(S): 17 POWDER, FOR SOLUTION ORAL at 05:11

## 2024-03-20 RX ADMIN — Medication 40 MILLIGRAM(S): at 05:11

## 2024-03-20 RX ADMIN — Medication 250 MICROGRAM(S): at 05:05

## 2024-03-20 RX ADMIN — Medication 5 MILLIGRAM(S): at 21:51

## 2024-03-20 RX ADMIN — GABAPENTIN 300 MILLIGRAM(S): 400 CAPSULE ORAL at 18:11

## 2024-03-20 RX ADMIN — APIXABAN 5 MILLIGRAM(S): 2.5 TABLET, FILM COATED ORAL at 05:06

## 2024-03-20 RX ADMIN — Medication 81 MILLIGRAM(S): at 12:07

## 2024-03-20 RX ADMIN — Medication 1 GRAM(S): at 18:11

## 2024-03-20 RX ADMIN — Medication 1: at 08:19

## 2024-03-20 NOTE — CONSULT NOTE ADULT - ASSESSMENT
Necrotic unstagable pressure ulcer posterior right heel , wet eschar and some sero-sangenous drainage , patient claims he has had the ulcer for several months

## 2024-03-20 NOTE — DIETITIAN INITIAL EVALUATION ADULT - PROBLEM SELECTOR PLAN 2
Monitor on tele  Continue iv cardizem gtt and then transition to po cardizem  S/P digoxin load  Check ECHO, TFTs  Continue Eliquis  Cardio consult  Further work-up/management pending clinical course. Abbe Flap (Upper To Lower Lip) Text: The defect of the lower lip was assessed and measured.  Given the location and size of the defect, an Abbe flap was deemed most appropriate. Using a sterile surgical marker, an appropriate Abbe flap was measured and drawn on the upper lip. Local anesthesia was then infiltrated.  A scalpel was then used to incise the upper lip through and through the skin, vermilion, muscle and mucosa, leaving the flap pedicled on the opposite side.  The flap was then rotated and transferred to the lower lip defect.  The flap was then sutured into place with a three layer technique, closing the orbicularis oris muscle layer with subcutaneous buried sutures, followed by a mucosal layer and an epidermal layer.
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
## 2024-03-20 NOTE — PROGRESS NOTE ADULT - SUBJECTIVE AND OBJECTIVE BOX
Date/Time Patient Seen:  		  Referring MD:   Data Reviewed	       Patient is a 53y old  Male who presents with a chief complaint of fever (19 Mar 2024 11:26)      Subjective/HPI     PAST MEDICAL & SURGICAL HISTORY:  No pertinent past medical history    Diabetes    No pertinent past medical history    Diabetes mellitus with no complication    Afib    Hypertension    BPH (benign prostatic hyperplasia)    Perforated gastric ulcer  s/p emergent ex-lap omentopexy and plication 6/2019    Pulmonary embolism    History of non-ST elevation myocardial infarction (NSTEMI)    Osteomyelitis  s/p debridement    CAD S/P percutaneous coronary angioplasty    Cerebrovascular accident    No significant past surgical history    No significant past surgical history    H/O abdominal surgery    Perforated gastric ulcer    Traumatic amputation of left foot, initial encounter          Medication list         MEDICATIONS  (STANDING):  apixaban 5 milliGRAM(s) Oral every 12 hours  ascorbic acid 500 milliGRAM(s) Oral daily  aspirin  chewable 81 milliGRAM(s) Oral daily  atorvastatin 40 milliGRAM(s) Oral at bedtime  bisacodyl 10 milliGRAM(s) Oral daily  cholecalciferol 1000 Unit(s) Oral daily  dextrose 5%. 1000 milliLiter(s) (100 mL/Hr) IV Continuous <Continuous>  dextrose 5%. 1000 milliLiter(s) (50 mL/Hr) IV Continuous <Continuous>  dextrose 50% Injectable 25 Gram(s) IV Push once  dextrose 50% Injectable 12.5 Gram(s) IV Push once  dextrose 50% Injectable 25 Gram(s) IV Push once  digoxin     Tablet 250 MICROGram(s) Oral daily  diltiazem    Tablet 90 milliGRAM(s) Oral every 6 hours  ertapenem  IVPB 1000 milliGRAM(s) IV Intermittent every 24 hours  ferrous    sulfate 325 milliGRAM(s) Oral two times a day  furosemide   Injectable 40 milliGRAM(s) IV Push every 12 hours  gabapentin 300 milliGRAM(s) Oral every 12 hours  glucagon  Injectable 1 milliGRAM(s) IntraMuscular once  insulin glargine Injectable (LANTUS) 15 Unit(s) SubCutaneous every morning  insulin glargine Injectable (LANTUS) 7 Unit(s) SubCutaneous at bedtime  insulin lispro (ADMELOG) corrective regimen sliding scale   SubCutaneous three times a day before meals  insulin lispro (ADMELOG) corrective regimen sliding scale   SubCutaneous at bedtime  loratadine 10 milliGRAM(s) Oral daily  melatonin 5 milliGRAM(s) Oral at bedtime  metoclopramide 5 milliGRAM(s) Oral three times a day  metoprolol tartrate 25 milliGRAM(s) Oral two times a day  multivitamin/minerals 1 Tablet(s) Oral daily  naloxegol 25 milliGRAM(s) Oral daily  oxybutynin 5 milliGRAM(s) Oral two times a day  oxyCODONE    IR 10 milliGRAM(s) Oral two times a day  pantoprazole    Tablet 40 milliGRAM(s) Oral before breakfast  polyethylene glycol 3350 17 Gram(s) Oral two times a day  saccharomyces boulardii 250 milliGRAM(s) Oral two times a day  senna 2 Tablet(s) Oral at bedtime  sodium chloride 0.65% Nasal 1 Spray(s) Both Nostrils daily  sucralfate 1 Gram(s) Oral two times a day  tiotropium 2.5 MICROgram(s) Inhaler 2 Puff(s) Inhalation daily    MEDICATIONS  (PRN):  acetaminophen     Tablet .. 650 milliGRAM(s) Oral every 6 hours PRN Temp greater or equal to 38C (100.4F), Mild Pain (1 - 3)  albuterol    0.083% 2.5 milliGRAM(s) Nebulizer every 4 hours PRN Shortness of Breath and/or Wheezing  aluminum hydroxide/magnesium hydroxide/simethicone Suspension 30 milliLiter(s) Oral every 4 hours PRN Dyspepsia  dextrose Oral Gel 15 Gram(s) Oral once PRN Blood Glucose LESS THAN 70 milliGRAM(s)/deciliter  ondansetron Injectable 4 milliGRAM(s) IV Push every 6 hours PRN Nausea and/or Vomiting         Vitals log        ICU Vital Signs Last 24 Hrs  T(C): 36.9 (20 Mar 2024 04:22), Max: 37.2 (19 Mar 2024 10:13)  T(F): 98.4 (20 Mar 2024 04:22), Max: 98.9 (19 Mar 2024 10:13)  HR: 80 (20 Mar 2024 04:22) (71 - 103)  BP: 131/78 (20 Mar 2024 04:22) (114/70 - 138/80)  BP(mean): --  ABP: --  ABP(mean): --  RR: 17 (20 Mar 2024 04:22) (16 - 18)  SpO2: 91% (20 Mar 2024 04:22) (90% - 93%)    O2 Parameters below as of 20 Mar 2024 04:22  Patient On (Oxygen Delivery Method): nasal cannula                 Input and Output:  I&O's Detail    18 Mar 2024 07:01  -  19 Mar 2024 07:00  --------------------------------------------------------  IN:    Oral Fluid: 600 mL  Total IN: 600 mL    OUT:    Indwelling Catheter - Urethral (mL): 2500 mL  Total OUT: 2500 mL    Total NET: -1900 mL      19 Mar 2024 07:01  -  20 Mar 2024 05:43  --------------------------------------------------------  IN:    Oral Fluid: 240 mL  Total IN: 240 mL    OUT:    Indwelling Catheter - Urethral (mL): 1500 mL  Total OUT: 1500 mL    Total NET: -1260 mL          Lab Data                        11.4   8.96  )-----------( 186      ( 19 Mar 2024 08:08 )             36.3     03-19    145  |  109<H>  |  17  ----------------------------<  176<H>  3.5   |  29  |  1.20    Ca    8.7      19 Mar 2024 08:08  Phos  3.1     03-19  Mg     1.9     03-19              Review of Systems	      Objective     Physical Examination    heart s1s2  lung dc BS  head nc      Pertinent Lab findings & Imaging      Ale:  NO   Adequate UO     I&O's Detail    18 Mar 2024 07:01  -  19 Mar 2024 07:00  --------------------------------------------------------  IN:    Oral Fluid: 600 mL  Total IN: 600 mL    OUT:    Indwelling Catheter - Urethral (mL): 2500 mL  Total OUT: 2500 mL    Total NET: -1900 mL      19 Mar 2024 07:01  -  20 Mar 2024 05:43  --------------------------------------------------------  IN:    Oral Fluid: 240 mL  Total IN: 240 mL    OUT:    Indwelling Catheter - Urethral (mL): 1500 mL  Total OUT: 1500 mL    Total NET: -1260 mL               Discussed with:     Cultures:	        Radiology

## 2024-03-20 NOTE — DIETITIAN INITIAL EVALUATION ADULT - ADD RECOMMEND
1) Continue current diet as ordered; Consistent Carbohydrate, DASH/TLC diet  2) Recommend MVI/minerals daily, continue vitamin C supplementation  3) Recommend NC Prosource daily (60kcal, 15gm protein per 30ml packet)  4) Monitor po intake, diet tolerance, weight trends, labs, GI function, skin integrity

## 2024-03-20 NOTE — DIETITIAN INITIAL EVALUATION ADULT - PERTINENT LABORATORY DATA
03-20    146<H>  |  107  |  17  ----------------------------<  188<H>  3.3<L>   |  32<H>  |  1.20    Ca    9.1      20 Mar 2024 06:15  Phos  3.1     03-19  Mg     1.7     03-20    POCT Blood Glucose.: 230 mg/dL (03-20-24 @ 12:12)  A1C with Estimated Average Glucose Result: 7.0 % (03-17-24 @ 06:41)  A1C with Estimated Average Glucose Result: 7.0 % (12-20-23 @ 08:20)

## 2024-03-20 NOTE — CONSULT NOTE ADULT - SUBJECTIVE AND OBJECTIVE BOX
ACUTE CARE SURGERY CONSULT    HPI:  This is a 53M with PMH of AF on Eliquis, indwelling jacques, CAD s/p stents, CVA, DM, HTN, osteomyelitis s/p debridement, PE, sent from [facility] for elevated BP, SOB, and dislodged jacques. Patient states he was having difficulty breathing yesterday and that he couldn't catch his breath. Denies feeling feverish, cp, abd pain, vomiting, diarrhea. He was found to be febrile to 103.8, lactate 3.1 --> 3.7. He was also found to be in DEIDRE. He received iv cardizem 10mg ivp x 1 and then was started on iv cardizem 10mg/hr gtt. It was dropped to 5mg/hr overnight due to hypotension. The patient was digoxin loaded due to hypotension and persistent AF with RVR in 140s-150s. (17 Mar 2024 06:22)      PAST MEDICAL HISTORY:  No pertinent past medical history    Diabetes    No pertinent past medical history    Diabetes mellitus with no complication    Afib    Hypertension    BPH (benign prostatic hyperplasia)    Perforated gastric ulcer    Pulmonary embolism    History of non-ST elevation myocardial infarction (NSTEMI)    Osteomyelitis    CAD S/P percutaneous coronary angioplasty    Cerebrovascular accident        PAST SURGICAL HISTORY:  No significant past surgical history    No significant past surgical history    H/O abdominal surgery    Perforated gastric ulcer    Traumatic amputation of left foot, initial encounter        ALLERGIES:  No Known Drug Allergies  fish (Hives)      FAMILY HISTORY:    SOCIAL HISTORY:    HOME MEDICATIONS:    MEDICATIONS  (STANDING):  apixaban 5 milliGRAM(s) Oral every 12 hours  ascorbic acid 500 milliGRAM(s) Oral daily  aspirin  chewable 81 milliGRAM(s) Oral daily  atorvastatin 40 milliGRAM(s) Oral at bedtime  bisacodyl 10 milliGRAM(s) Oral daily  cholecalciferol 1000 Unit(s) Oral daily  dextrose 5%. 1000 milliLiter(s) (100 mL/Hr) IV Continuous <Continuous>  dextrose 5%. 1000 milliLiter(s) (50 mL/Hr) IV Continuous <Continuous>  dextrose 50% Injectable 25 Gram(s) IV Push once  dextrose 50% Injectable 12.5 Gram(s) IV Push once  dextrose 50% Injectable 25 Gram(s) IV Push once  digoxin     Tablet 250 MICROGram(s) Oral daily  diltiazem    Tablet 90 milliGRAM(s) Oral every 6 hours  ertapenem  IVPB 1000 milliGRAM(s) IV Intermittent every 24 hours  ferrous    sulfate 325 milliGRAM(s) Oral two times a day  furosemide   Injectable 40 milliGRAM(s) IV Push every 12 hours  gabapentin 300 milliGRAM(s) Oral every 12 hours  glucagon  Injectable 1 milliGRAM(s) IntraMuscular once  insulin glargine Injectable (LANTUS) 15 Unit(s) SubCutaneous every morning  insulin glargine Injectable (LANTUS) 7 Unit(s) SubCutaneous at bedtime  insulin lispro (ADMELOG) corrective regimen sliding scale   SubCutaneous three times a day before meals  insulin lispro (ADMELOG) corrective regimen sliding scale   SubCutaneous at bedtime  loratadine 10 milliGRAM(s) Oral daily  melatonin 5 milliGRAM(s) Oral at bedtime  metoclopramide 5 milliGRAM(s) Oral three times a day  metoprolol tartrate 25 milliGRAM(s) Oral two times a day  multivitamin/minerals 1 Tablet(s) Oral daily  naloxegol 25 milliGRAM(s) Oral daily  oxybutynin 5 milliGRAM(s) Oral two times a day  oxyCODONE    IR 10 milliGRAM(s) Oral two times a day  pantoprazole    Tablet 40 milliGRAM(s) Oral before breakfast  polyethylene glycol 3350 17 Gram(s) Oral two times a day  saccharomyces boulardii 250 milliGRAM(s) Oral two times a day  senna 2 Tablet(s) Oral at bedtime  sodium chloride 0.65% Nasal 1 Spray(s) Both Nostrils daily  sucralfate 1 Gram(s) Oral two times a day  tiotropium 2.5 MICROgram(s) Inhaler 2 Puff(s) Inhalation daily    MEDICATIONS  (PRN):  acetaminophen     Tablet .. 650 milliGRAM(s) Oral every 6 hours PRN Temp greater or equal to 38C (100.4F), Mild Pain (1 - 3)  albuterol    0.083% 2.5 milliGRAM(s) Nebulizer every 4 hours PRN Shortness of Breath and/or Wheezing  aluminum hydroxide/magnesium hydroxide/simethicone Suspension 30 milliLiter(s) Oral every 4 hours PRN Dyspepsia  dextrose Oral Gel 15 Gram(s) Oral once PRN Blood Glucose LESS THAN 70 milliGRAM(s)/deciliter  ondansetron Injectable 4 milliGRAM(s) IV Push every 6 hours PRN Nausea and/or Vomiting      VITALS & I/Os:  Vital Signs Last 24 Hrs  T(C): 36.7 (20 Mar 2024 12:06), Max: 36.9 (20 Mar 2024 04:22)  T(F): 98 (20 Mar 2024 12:06), Max: 98.4 (20 Mar 2024 04:22)  HR: 66 (20 Mar 2024 12:06) (66 - 103)  BP: 122/75 (20 Mar 2024 12:06) (122/75 - 138/80)  BP(mean): --  RR: 18 (20 Mar 2024 12:06) (17 - 18)  SpO2: 94% (20 Mar 2024 12:06) (91% - 94%)    Parameters below as of 20 Mar 2024 12:06  Patient On (Oxygen Delivery Method): nasal cannula  O2 Flow (L/min): 2    CAPILLARY BLOOD GLUCOSE      POCT Blood Glucose.: 230 mg/dL (20 Mar 2024 12:12)  POCT Blood Glucose.: 176 mg/dL (20 Mar 2024 08:16)  POCT Blood Glucose.: 209 mg/dL (19 Mar 2024 21:11)      I&O's Summary    19 Mar 2024 07:01  -  20 Mar 2024 07:00  --------------------------------------------------------  IN: 240 mL / OUT: 1500 mL / NET: -1260 mL    20 Mar 2024 07:01  -  20 Mar 2024 17:18  --------------------------------------------------------  IN: 840 mL / OUT: 1475 mL / NET: -635 mL        GENERAL: Alert, well developed, in no acute distress.  MENTAL STATUS: AAOx3. Appropriate affect.  HEENT: PERRLA. EOMI. MMM.  Trachea midline. No lymph node swelling or tenderness.  RESPIRATORY: CTAB. No wheezing, rales or rhonchi.  CARDIOVASCULAR: RRR. No audible murmurs, rubs or gallops.   GASTROINTESTINAL: Abdomen soft, NT, ND, -R/-G.  No pulsatile mass, no flank tenderness or suprapubic tenderness. No hepatosplenomegaly.  NEUROLOGIC: Cranial nerves II-XII grossly intact. No focal neurological deficits. Moves all extremities spontaneously. Sensation intact bilaterally.  INTGUMENTARY: No overt rashes or lesions, petechia or purpura. Good turgor. No edema.  MUSCULOSKELETAL: No cyanosis or clubbing. No gross deformities.   LYMPHATIC: Palpation of neck reveals no swelling or tenderness of neck nodes. Palpation of groin reveals no swelling or tenderness of groin nodes.    LABS:                        12.1   7.98  )-----------( 194      ( 20 Mar 2024 06:15 )             37.9     03-20    146<H>  |  107  |  17  ----------------------------<  188<H>  3.3<L>   |  32<H>  |  1.20    Ca    9.1      20 Mar 2024 06:15  Phos  3.1     03-19  Mg     1.7     03-20      Lactate: acetaminophen     Tablet .. 650 milliGRAM(s) Oral every 6 hours PRN  albuterol    0.083% 2.5 milliGRAM(s) Nebulizer every 4 hours PRN  aluminum hydroxide/magnesium hydroxide/simethicone Suspension 30 milliLiter(s) Oral every 4 hours PRN  apixaban 5 milliGRAM(s) Oral every 12 hours  ascorbic acid 500 milliGRAM(s) Oral daily  aspirin  chewable 81 milliGRAM(s) Oral daily  atorvastatin 40 milliGRAM(s) Oral at bedtime  bisacodyl 10 milliGRAM(s) Oral daily  cholecalciferol 1000 Unit(s) Oral daily  dextrose 5%. 1000 milliLiter(s) IV Continuous <Continuous>  dextrose 5%. 1000 milliLiter(s) IV Continuous <Continuous>  dextrose 50% Injectable 25 Gram(s) IV Push once  dextrose 50% Injectable 12.5 Gram(s) IV Push once  dextrose 50% Injectable 25 Gram(s) IV Push once  dextrose Oral Gel 15 Gram(s) Oral once PRN  digoxin     Tablet 250 MICROGram(s) Oral daily  diltiazem    Tablet 90 milliGRAM(s) Oral every 6 hours  ertapenem  IVPB 1000 milliGRAM(s) IV Intermittent every 24 hours  ferrous    sulfate 325 milliGRAM(s) Oral two times a day  furosemide   Injectable 40 milliGRAM(s) IV Push every 12 hours  gabapentin 300 milliGRAM(s) Oral every 12 hours  glucagon  Injectable 1 milliGRAM(s) IntraMuscular once  insulin glargine Injectable (LANTUS) 15 Unit(s) SubCutaneous every morning  insulin glargine Injectable (LANTUS) 7 Unit(s) SubCutaneous at bedtime  insulin lispro (ADMELOG) corrective regimen sliding scale   SubCutaneous three times a day before meals  insulin lispro (ADMELOG) corrective regimen sliding scale   SubCutaneous at bedtime  loratadine 10 milliGRAM(s) Oral daily  melatonin 5 milliGRAM(s) Oral at bedtime  metoclopramide 5 milliGRAM(s) Oral three times a day  metoprolol tartrate 25 milliGRAM(s) Oral two times a day  multivitamin/minerals 1 Tablet(s) Oral daily  naloxegol 25 milliGRAM(s) Oral daily  ondansetron Injectable 4 milliGRAM(s) IV Push every 6 hours PRN  oxybutynin 5 milliGRAM(s) Oral two times a day  oxyCODONE    IR 10 milliGRAM(s) Oral two times a day  pantoprazole    Tablet 40 milliGRAM(s) Oral before breakfast  polyethylene glycol 3350 17 Gram(s) Oral two times a day  saccharomyces boulardii 250 milliGRAM(s) Oral two times a day  senna 2 Tablet(s) Oral at bedtime  sodium chloride 0.65% Nasal 1 Spray(s) Both Nostrils daily  sucralfate 1 Gram(s) Oral two times a day  tiotropium 2.5 MICROgram(s) Inhaler 2 Puff(s) Inhalation daily               Urinalysis Basic - ( 20 Mar 2024 06:15 )    Color: x / Appearance: x / SG: x / pH: x  Gluc: 188 mg/dL / Ketone: x  / Bili: x / Urobili: x   Blood: x / Protein: x / Nitrite: x   Leuk Esterase: x / RBC: x / WBC x   Sq Epi: x / Non Sq Epi: x / Bacteria: x        IMAGING:   VASCULAR CARE SURGERY CONSULT    HPI:  This is a 53M with PMH of AF on Eliquis, indwelling jacques, CAD s/p stents, CVA, DM, HTN, osteomyelitis s/p debridement, PE, sent from [facility] for elevated BP, SOB, and dislodged jacques. Patient states he was having difficulty breathing yesterday and that he couldn't catch his breath. Denies feeling feverish, cp, abd pain, vomiting, diarrhea. He was found to be febrile to 103.8, lactate 3.1 --> 3.7. He was also found to be in DEIDRE. He received iv cardizem 10mg ivp x 1 and then was started on iv cardizem 10mg/hr gtt. It was dropped to 5mg/hr overnight due to hypotension. The patient was digoxin loaded due to hypotension and persistent AF with RVR in 140s-150s. (17 Mar 2024 06:22)    Vascular surgery was consulted for nonstageable right posterior heel diabetic foot ulcer. Patient states that he's had the ulcer for months and that it's shrunk relatively in size w/o any symptoms. At baseline, patient has no sensation to distal shin due to diabetic neuropathy. Denies history of claudication or vascular disease.    Denies fever, chills, chest pain, dyspnea, nausea, vomiting at this time.      PAST MEDICAL HISTORY:  Diabetes  Afib  Hypertension  BPH (benign prostatic hyperplasia)  Perforated gastric ulcer  Pulmonary embolism  History of non-ST elevation myocardial infarction (NSTEMI)  Osteomyelitis L foot s/p amputation  CAD S/P percutaneous coronary angioplasty  Cerebrovascular acciden      PAST SURGICAL HISTORY:  Perforated gastric ulcer  Traumatic amputation of left foot    ALLERGIES:  No Known Drug Allergies  fish (Hives)      FAMILY HISTORY:    SOCIAL HISTORY:    HOME MEDICATIONS:    MEDICATIONS  (STANDING):  apixaban 5 milliGRAM(s) Oral every 12 hours  ascorbic acid 500 milliGRAM(s) Oral daily  aspirin  chewable 81 milliGRAM(s) Oral daily  atorvastatin 40 milliGRAM(s) Oral at bedtime  bisacodyl 10 milliGRAM(s) Oral daily  cholecalciferol 1000 Unit(s) Oral daily  dextrose 5%. 1000 milliLiter(s) (100 mL/Hr) IV Continuous <Continuous>  dextrose 5%. 1000 milliLiter(s) (50 mL/Hr) IV Continuous <Continuous>  dextrose 50% Injectable 25 Gram(s) IV Push once  dextrose 50% Injectable 12.5 Gram(s) IV Push once  dextrose 50% Injectable 25 Gram(s) IV Push once  digoxin     Tablet 250 MICROGram(s) Oral daily  diltiazem    Tablet 90 milliGRAM(s) Oral every 6 hours  ertapenem  IVPB 1000 milliGRAM(s) IV Intermittent every 24 hours  ferrous    sulfate 325 milliGRAM(s) Oral two times a day  furosemide   Injectable 40 milliGRAM(s) IV Push every 12 hours  gabapentin 300 milliGRAM(s) Oral every 12 hours  glucagon  Injectable 1 milliGRAM(s) IntraMuscular once  insulin glargine Injectable (LANTUS) 15 Unit(s) SubCutaneous every morning  insulin glargine Injectable (LANTUS) 7 Unit(s) SubCutaneous at bedtime  insulin lispro (ADMELOG) corrective regimen sliding scale   SubCutaneous three times a day before meals  insulin lispro (ADMELOG) corrective regimen sliding scale   SubCutaneous at bedtime  loratadine 10 milliGRAM(s) Oral daily  melatonin 5 milliGRAM(s) Oral at bedtime  metoclopramide 5 milliGRAM(s) Oral three times a day  metoprolol tartrate 25 milliGRAM(s) Oral two times a day  multivitamin/minerals 1 Tablet(s) Oral daily  naloxegol 25 milliGRAM(s) Oral daily  oxybutynin 5 milliGRAM(s) Oral two times a day  oxyCODONE    IR 10 milliGRAM(s) Oral two times a day  pantoprazole    Tablet 40 milliGRAM(s) Oral before breakfast  polyethylene glycol 3350 17 Gram(s) Oral two times a day  saccharomyces boulardii 250 milliGRAM(s) Oral two times a day  senna 2 Tablet(s) Oral at bedtime  sodium chloride 0.65% Nasal 1 Spray(s) Both Nostrils daily  sucralfate 1 Gram(s) Oral two times a day  tiotropium 2.5 MICROgram(s) Inhaler 2 Puff(s) Inhalation daily    MEDICATIONS  (PRN):  acetaminophen     Tablet .. 650 milliGRAM(s) Oral every 6 hours PRN Temp greater or equal to 38C (100.4F), Mild Pain (1 - 3)  albuterol    0.083% 2.5 milliGRAM(s) Nebulizer every 4 hours PRN Shortness of Breath and/or Wheezing  aluminum hydroxide/magnesium hydroxide/simethicone Suspension 30 milliLiter(s) Oral every 4 hours PRN Dyspepsia  dextrose Oral Gel 15 Gram(s) Oral once PRN Blood Glucose LESS THAN 70 milliGRAM(s)/deciliter  ondansetron Injectable 4 milliGRAM(s) IV Push every 6 hours PRN Nausea and/or Vomiting      VITALS & I/Os:  Vital Signs Last 24 Hrs  T(C): 36.7 (20 Mar 2024 12:06), Max: 36.9 (20 Mar 2024 04:22)  T(F): 98 (20 Mar 2024 12:06), Max: 98.4 (20 Mar 2024 04:22)  HR: 66 (20 Mar 2024 12:06) (66 - 103)  BP: 122/75 (20 Mar 2024 12:06) (122/75 - 138/80)  BP(mean): --  RR: 18 (20 Mar 2024 12:06) (17 - 18)  SpO2: 94% (20 Mar 2024 12:06) (91% - 94%)    Parameters below as of 20 Mar 2024 12:06  Patient On (Oxygen Delivery Method): nasal cannula  O2 Flow (L/min): 2    CAPILLARY BLOOD GLUCOSE      POCT Blood Glucose.: 230 mg/dL (20 Mar 2024 12:12)  POCT Blood Glucose.: 176 mg/dL (20 Mar 2024 08:16)  POCT Blood Glucose.: 209 mg/dL (19 Mar 2024 21:11)      I&O's Summary    19 Mar 2024 07:01  -  20 Mar 2024 07:00  --------------------------------------------------------  IN: 240 mL / OUT: 1500 mL / NET: -1260 mL    20 Mar 2024 07:01  -  20 Mar 2024 17:18  --------------------------------------------------------  IN: 840 mL / OUT: 1475 mL / NET: -635 mL        GENERAL: Alert, well developed, in no acute distress.  MENTAL STATUS: AAOx3. Appropriate affect.  RESPIRATORY: CTAB. No wheezing, rales or rhonchi.  CARDIOVASCULAR: Afib, no tachycardia.  GASTROINTESTINAL: Abdomen soft, NT, ND.  NEUROLOGIC: Cranial nerves II-XII grossly intact.  : Indwelling jacques exchanged in ED  EXTREMITY: Decreased strength in LE. s/p L TMA. R heel ulcer with wet eschar. WWP  VASCULAR: palpable femoral, DP, and PT bilaterally. Dopplerable DP and DP R foot    LABS:                        12.1   7.98  )-----------( 194      ( 20 Mar 2024 06:15 )             37.9     03-20    146<H>  |  107  |  17  ----------------------------<  188<H>  3.3<L>   |  32<H>  |  1.20    Ca    9.1      20 Mar 2024 06:15  Phos  3.1     03-19  Mg     1.7     03-20      Lactate: acetaminophen     Tablet .. 650 milliGRAM(s) Oral every 6 hours PRN  albuterol    0.083% 2.5 milliGRAM(s) Nebulizer every 4 hours PRN  aluminum hydroxide/magnesium hydroxide/simethicone Suspension 30 milliLiter(s) Oral every 4 hours PRN  apixaban 5 milliGRAM(s) Oral every 12 hours  ascorbic acid 500 milliGRAM(s) Oral daily  aspirin  chewable 81 milliGRAM(s) Oral daily  atorvastatin 40 milliGRAM(s) Oral at bedtime  bisacodyl 10 milliGRAM(s) Oral daily  cholecalciferol 1000 Unit(s) Oral daily  dextrose 5%. 1000 milliLiter(s) IV Continuous <Continuous>  dextrose 5%. 1000 milliLiter(s) IV Continuous <Continuous>  dextrose 50% Injectable 25 Gram(s) IV Push once  dextrose 50% Injectable 12.5 Gram(s) IV Push once  dextrose 50% Injectable 25 Gram(s) IV Push once  dextrose Oral Gel 15 Gram(s) Oral once PRN  digoxin     Tablet 250 MICROGram(s) Oral daily  diltiazem    Tablet 90 milliGRAM(s) Oral every 6 hours  ertapenem  IVPB 1000 milliGRAM(s) IV Intermittent every 24 hours  ferrous    sulfate 325 milliGRAM(s) Oral two times a day  furosemide   Injectable 40 milliGRAM(s) IV Push every 12 hours  gabapentin 300 milliGRAM(s) Oral every 12 hours  glucagon  Injectable 1 milliGRAM(s) IntraMuscular once  insulin glargine Injectable (LANTUS) 15 Unit(s) SubCutaneous every morning  insulin glargine Injectable (LANTUS) 7 Unit(s) SubCutaneous at bedtime  insulin lispro (ADMELOG) corrective regimen sliding scale   SubCutaneous three times a day before meals  insulin lispro (ADMELOG) corrective regimen sliding scale   SubCutaneous at bedtime  loratadine 10 milliGRAM(s) Oral daily  melatonin 5 milliGRAM(s) Oral at bedtime  metoclopramide 5 milliGRAM(s) Oral three times a day  metoprolol tartrate 25 milliGRAM(s) Oral two times a day  multivitamin/minerals 1 Tablet(s) Oral daily  naloxegol 25 milliGRAM(s) Oral daily  ondansetron Injectable 4 milliGRAM(s) IV Push every 6 hours PRN  oxybutynin 5 milliGRAM(s) Oral two times a day  oxyCODONE    IR 10 milliGRAM(s) Oral two times a day  pantoprazole    Tablet 40 milliGRAM(s) Oral before breakfast  polyethylene glycol 3350 17 Gram(s) Oral two times a day  saccharomyces boulardii 250 milliGRAM(s) Oral two times a day  senna 2 Tablet(s) Oral at bedtime  sodium chloride 0.65% Nasal 1 Spray(s) Both Nostrils daily  sucralfate 1 Gram(s) Oral two times a day  tiotropium 2.5 MICROgram(s) Inhaler 2 Puff(s) Inhalation daily               Urinalysis Basic - ( 20 Mar 2024 06:15 )    Color: x / Appearance: x / SG: x / pH: x  Gluc: 188 mg/dL / Ketone: x  / Bili: x / Urobili: x   Blood: x / Protein: x / Nitrite: x   Leuk Esterase: x / RBC: x / WBC x   Sq Epi: x / Non Sq Epi: x / Bacteria: x        IMAGING:  Pending XR and MRI of R heel per podiatry    ASSESSMENT AND PLAN:  53M with significant past medical history including CAD s/p RCA stent, diabetics c/b diabetic neuropathy, CVA c/b bladder dysfunction admitted for fever. Vascular surgery consulted for right posterior heel diabetic ulcer. Podiatry on board recommending MRI and XR.   Patient vital stable, vascular exam shows palpable and dopplerable pedal pulses.    - No indication for vascular surgical intervention at this time  - Vascular cleared for podiatric intervention    Patient discussed with Dr. Silva VASCULAR CARE SURGERY CONSULT    HPI:  This is a 53M with PMH of AF on Eliquis, indwelling jacques, CAD s/p stents, CVA, DM, HTN, osteomyelitis s/p debridement, PE, sent from [facility] for elevated BP, SOB, and dislodged jacques. Patient states he was having difficulty breathing yesterday and that he couldn't catch his breath. Denies feeling feverish, cp, abd pain, vomiting, diarrhea. He was found to be febrile to 103.8, lactate 3.1 --> 3.7. He was also found to be in DEIDRE. He received iv cardizem 10mg ivp x 1 and then was started on iv cardizem 10mg/hr gtt. It was dropped to 5mg/hr overnight due to hypotension. The patient was digoxin loaded due to hypotension and persistent AF with RVR in 140s-150s. (17 Mar 2024 06:22)    Vascular surgery was consulted for nonstageable right posterior heel diabetic foot ulcer. Patient states that he's had the ulcer for months and that it's shrunk relatively in size w/o any symptoms. At baseline, patient has no sensation to distal shin due to diabetic neuropathy. Denies history of claudication or vascular disease.    Denies fever, chills, chest pain, dyspnea, nausea, vomiting at this time.      PAST MEDICAL HISTORY:  Diabetes  Afib  Hypertension  BPH (benign prostatic hyperplasia)  Perforated gastric ulcer  Pulmonary embolism  History of non-ST elevation myocardial infarction (NSTEMI)  Osteomyelitis L foot s/p amputation  CAD S/P percutaneous coronary angioplasty  Cerebrovascular acciden      PAST SURGICAL HISTORY:  Perforated gastric ulcer  Traumatic amputation of left foot    ALLERGIES:  No Known Drug Allergies  fish (Hives)      FAMILY HISTORY:    SOCIAL HISTORY:    HOME MEDICATIONS:    MEDICATIONS  (STANDING):  apixaban 5 milliGRAM(s) Oral every 12 hours  ascorbic acid 500 milliGRAM(s) Oral daily  aspirin  chewable 81 milliGRAM(s) Oral daily  atorvastatin 40 milliGRAM(s) Oral at bedtime  bisacodyl 10 milliGRAM(s) Oral daily  cholecalciferol 1000 Unit(s) Oral daily  dextrose 5%. 1000 milliLiter(s) (100 mL/Hr) IV Continuous <Continuous>  dextrose 5%. 1000 milliLiter(s) (50 mL/Hr) IV Continuous <Continuous>  dextrose 50% Injectable 25 Gram(s) IV Push once  dextrose 50% Injectable 12.5 Gram(s) IV Push once  dextrose 50% Injectable 25 Gram(s) IV Push once  digoxin     Tablet 250 MICROGram(s) Oral daily  diltiazem    Tablet 90 milliGRAM(s) Oral every 6 hours  ertapenem  IVPB 1000 milliGRAM(s) IV Intermittent every 24 hours  ferrous    sulfate 325 milliGRAM(s) Oral two times a day  furosemide   Injectable 40 milliGRAM(s) IV Push every 12 hours  gabapentin 300 milliGRAM(s) Oral every 12 hours  glucagon  Injectable 1 milliGRAM(s) IntraMuscular once  insulin glargine Injectable (LANTUS) 15 Unit(s) SubCutaneous every morning  insulin glargine Injectable (LANTUS) 7 Unit(s) SubCutaneous at bedtime  insulin lispro (ADMELOG) corrective regimen sliding scale   SubCutaneous three times a day before meals  insulin lispro (ADMELOG) corrective regimen sliding scale   SubCutaneous at bedtime  loratadine 10 milliGRAM(s) Oral daily  melatonin 5 milliGRAM(s) Oral at bedtime  metoclopramide 5 milliGRAM(s) Oral three times a day  metoprolol tartrate 25 milliGRAM(s) Oral two times a day  multivitamin/minerals 1 Tablet(s) Oral daily  naloxegol 25 milliGRAM(s) Oral daily  oxybutynin 5 milliGRAM(s) Oral two times a day  oxyCODONE    IR 10 milliGRAM(s) Oral two times a day  pantoprazole    Tablet 40 milliGRAM(s) Oral before breakfast  polyethylene glycol 3350 17 Gram(s) Oral two times a day  saccharomyces boulardii 250 milliGRAM(s) Oral two times a day  senna 2 Tablet(s) Oral at bedtime  sodium chloride 0.65% Nasal 1 Spray(s) Both Nostrils daily  sucralfate 1 Gram(s) Oral two times a day  tiotropium 2.5 MICROgram(s) Inhaler 2 Puff(s) Inhalation daily    MEDICATIONS  (PRN):  acetaminophen     Tablet .. 650 milliGRAM(s) Oral every 6 hours PRN Temp greater or equal to 38C (100.4F), Mild Pain (1 - 3)  albuterol    0.083% 2.5 milliGRAM(s) Nebulizer every 4 hours PRN Shortness of Breath and/or Wheezing  aluminum hydroxide/magnesium hydroxide/simethicone Suspension 30 milliLiter(s) Oral every 4 hours PRN Dyspepsia  dextrose Oral Gel 15 Gram(s) Oral once PRN Blood Glucose LESS THAN 70 milliGRAM(s)/deciliter  ondansetron Injectable 4 milliGRAM(s) IV Push every 6 hours PRN Nausea and/or Vomiting      VITALS & I/Os:  Vital Signs Last 24 Hrs  T(C): 36.7 (20 Mar 2024 12:06), Max: 36.9 (20 Mar 2024 04:22)  T(F): 98 (20 Mar 2024 12:06), Max: 98.4 (20 Mar 2024 04:22)  HR: 66 (20 Mar 2024 12:06) (66 - 103)  BP: 122/75 (20 Mar 2024 12:06) (122/75 - 138/80)  BP(mean): --  RR: 18 (20 Mar 2024 12:06) (17 - 18)  SpO2: 94% (20 Mar 2024 12:06) (91% - 94%)    Parameters below as of 20 Mar 2024 12:06  Patient On (Oxygen Delivery Method): nasal cannula  O2 Flow (L/min): 2    CAPILLARY BLOOD GLUCOSE      POCT Blood Glucose.: 230 mg/dL (20 Mar 2024 12:12)  POCT Blood Glucose.: 176 mg/dL (20 Mar 2024 08:16)  POCT Blood Glucose.: 209 mg/dL (19 Mar 2024 21:11)      I&O's Summary    19 Mar 2024 07:01  -  20 Mar 2024 07:00  --------------------------------------------------------  IN: 240 mL / OUT: 1500 mL / NET: -1260 mL    20 Mar 2024 07:01  -  20 Mar 2024 17:18  --------------------------------------------------------  IN: 840 mL / OUT: 1475 mL / NET: -635 mL        GENERAL: Alert, well developed, in no acute distress.  MENTAL STATUS: AAOx3. Appropriate affect.  RESPIRATORY: CTAB. No wheezing, rales or rhonchi.  CARDIOVASCULAR: Afib, no tachycardia.  GASTROINTESTINAL: Abdomen soft, NT, ND.  NEUROLOGIC: Cranial nerves II-XII grossly intact.  : Indwelling jacques exchanged in ED  EXTREMITY: Decreased strength in LE. s/p L TMA. R heel ulcer with wet eschar. WWP  VASCULAR: palpable femoral, DP, and PT bilaterally. Dopplerable DP and DP R foot    LABS:                        12.1   7.98  )-----------( 194      ( 20 Mar 2024 06:15 )             37.9     03-20    146<H>  |  107  |  17  ----------------------------<  188<H>  3.3<L>   |  32<H>  |  1.20    Ca    9.1      20 Mar 2024 06:15  Phos  3.1     03-19  Mg     1.7     03-20      Lactate: acetaminophen     Tablet .. 650 milliGRAM(s) Oral every 6 hours PRN  albuterol    0.083% 2.5 milliGRAM(s) Nebulizer every 4 hours PRN  aluminum hydroxide/magnesium hydroxide/simethicone Suspension 30 milliLiter(s) Oral every 4 hours PRN  apixaban 5 milliGRAM(s) Oral every 12 hours  ascorbic acid 500 milliGRAM(s) Oral daily  aspirin  chewable 81 milliGRAM(s) Oral daily  atorvastatin 40 milliGRAM(s) Oral at bedtime  bisacodyl 10 milliGRAM(s) Oral daily  cholecalciferol 1000 Unit(s) Oral daily  dextrose 5%. 1000 milliLiter(s) IV Continuous <Continuous>  dextrose 5%. 1000 milliLiter(s) IV Continuous <Continuous>  dextrose 50% Injectable 25 Gram(s) IV Push once  dextrose 50% Injectable 12.5 Gram(s) IV Push once  dextrose 50% Injectable 25 Gram(s) IV Push once  dextrose Oral Gel 15 Gram(s) Oral once PRN  digoxin     Tablet 250 MICROGram(s) Oral daily  diltiazem    Tablet 90 milliGRAM(s) Oral every 6 hours  ertapenem  IVPB 1000 milliGRAM(s) IV Intermittent every 24 hours  ferrous    sulfate 325 milliGRAM(s) Oral two times a day  furosemide   Injectable 40 milliGRAM(s) IV Push every 12 hours  gabapentin 300 milliGRAM(s) Oral every 12 hours  glucagon  Injectable 1 milliGRAM(s) IntraMuscular once  insulin glargine Injectable (LANTUS) 15 Unit(s) SubCutaneous every morning  insulin glargine Injectable (LANTUS) 7 Unit(s) SubCutaneous at bedtime  insulin lispro (ADMELOG) corrective regimen sliding scale   SubCutaneous three times a day before meals  insulin lispro (ADMELOG) corrective regimen sliding scale   SubCutaneous at bedtime  loratadine 10 milliGRAM(s) Oral daily  melatonin 5 milliGRAM(s) Oral at bedtime  metoclopramide 5 milliGRAM(s) Oral three times a day  metoprolol tartrate 25 milliGRAM(s) Oral two times a day  multivitamin/minerals 1 Tablet(s) Oral daily  naloxegol 25 milliGRAM(s) Oral daily  ondansetron Injectable 4 milliGRAM(s) IV Push every 6 hours PRN  oxybutynin 5 milliGRAM(s) Oral two times a day  oxyCODONE    IR 10 milliGRAM(s) Oral two times a day  pantoprazole    Tablet 40 milliGRAM(s) Oral before breakfast  polyethylene glycol 3350 17 Gram(s) Oral two times a day  saccharomyces boulardii 250 milliGRAM(s) Oral two times a day  senna 2 Tablet(s) Oral at bedtime  sodium chloride 0.65% Nasal 1 Spray(s) Both Nostrils daily  sucralfate 1 Gram(s) Oral two times a day  tiotropium 2.5 MICROgram(s) Inhaler 2 Puff(s) Inhalation daily               Urinalysis Basic - ( 20 Mar 2024 06:15 )    Color: x / Appearance: x / SG: x / pH: x  Gluc: 188 mg/dL / Ketone: x  / Bili: x / Urobili: x   Blood: x / Protein: x / Nitrite: x   Leuk Esterase: x / RBC: x / WBC x   Sq Epi: x / Non Sq Epi: x / Bacteria: x        IMAGING:  Pending XR and MRI of R heel per podiatry    ASSESSMENT AND PLAN:  53M with significant past medical history including CAD s/p RCA stent, diabetics c/b diabetic neuropathy, CVA c/b bladder dysfunction admitted for fever. Vascular surgery consulted for right posterior heel diabetic ulcer. Podiatry on board recommending MRI and XR.   Patient vital stable, vascular exam shows palpable and dopplerable pedal pulses.    - No indication for vascular surgical intervention at this time  - Vascular cleared for podiatric intervention  - Rest of care per primary team    Patient discussed with Dr. Silva

## 2024-03-20 NOTE — PROGRESS NOTE ADULT - SUBJECTIVE AND OBJECTIVE BOX
Date of Service: 03-20-24 @ 12:12    Patient is a 53y old  Male who presents with a chief complaint of fever (19 Mar 2024 11:26)      INTERVAL HPI/OVERNIGHT EVENTS: Patient seen and examined. NAD. No complaints.    Vital Signs Last 24 Hrs  T(C): 36.9 (20 Mar 2024 04:22), Max: 36.9 (19 Mar 2024 13:57)  T(F): 98.4 (20 Mar 2024 04:22), Max: 98.5 (19 Mar 2024 13:57)  HR: 80 (20 Mar 2024 04:22) (71 - 103)  BP: 131/78 (20 Mar 2024 04:22) (116/76 - 138/80)  BP(mean): --  RR: 17 (20 Mar 2024 04:22) (16 - 18)  SpO2: 91% (20 Mar 2024 04:22) (90% - 93%)    Parameters below as of 20 Mar 2024 04:22  Patient On (Oxygen Delivery Method): nasal cannula        03-20    146<H>  |  107  |  17  ----------------------------<  188<H>  3.3<L>   |  32<H>  |  1.20    Ca    9.1      20 Mar 2024 06:15  Phos  3.1     03-19  Mg     1.7     03-20                            12.1   7.98  )-----------( 194      ( 20 Mar 2024 06:15 )             37.9       CAPILLARY BLOOD GLUCOSE      POCT Blood Glucose.: 176 mg/dL (20 Mar 2024 08:16)  POCT Blood Glucose.: 209 mg/dL (19 Mar 2024 21:11)  POCT Blood Glucose.: 210 mg/dL (19 Mar 2024 16:28)  POCT Blood Glucose.: 205 mg/dL (19 Mar 2024 12:18)    Urinalysis Basic - ( 20 Mar 2024 06:15 )    Color: x / Appearance: x / SG: x / pH: x  Gluc: 188 mg/dL / Ketone: x  / Bili: x / Urobili: x   Blood: x / Protein: x / Nitrite: x   Leuk Esterase: x / RBC: x / WBC x   Sq Epi: x / Non Sq Epi: x / Bacteria: x    Culture - Blood in AM (03.18.24 @ 08:05)   Specimen Source: .Blood Blood-Peripheral  Culture Results:   No growth at 24 hours      Historical Values  Culture - Blood in AM (03.18.24 @ 08:05)   Specimen Source: .Blood Blood-Peripheral  Culture Results:   No growth at 24 hours          acetaminophen     Tablet .. 650 milliGRAM(s) Oral every 6 hours PRN  albuterol    0.083% 2.5 milliGRAM(s) Nebulizer every 4 hours PRN  aluminum hydroxide/magnesium hydroxide/simethicone Suspension 30 milliLiter(s) Oral every 4 hours PRN  apixaban 5 milliGRAM(s) Oral every 12 hours  ascorbic acid 500 milliGRAM(s) Oral daily  aspirin  chewable 81 milliGRAM(s) Oral daily  atorvastatin 40 milliGRAM(s) Oral at bedtime  bisacodyl 10 milliGRAM(s) Oral daily  cholecalciferol 1000 Unit(s) Oral daily  dextrose 5%. 1000 milliLiter(s) IV Continuous <Continuous>  dextrose 5%. 1000 milliLiter(s) IV Continuous <Continuous>  dextrose 50% Injectable 25 Gram(s) IV Push once  dextrose 50% Injectable 12.5 Gram(s) IV Push once  dextrose 50% Injectable 25 Gram(s) IV Push once  dextrose Oral Gel 15 Gram(s) Oral once PRN  digoxin     Tablet 250 MICROGram(s) Oral daily  diltiazem    Tablet 90 milliGRAM(s) Oral every 6 hours  ertapenem  IVPB 1000 milliGRAM(s) IV Intermittent every 24 hours  ferrous    sulfate 325 milliGRAM(s) Oral two times a day  furosemide   Injectable 40 milliGRAM(s) IV Push every 12 hours  gabapentin 300 milliGRAM(s) Oral every 12 hours  glucagon  Injectable 1 milliGRAM(s) IntraMuscular once  insulin glargine Injectable (LANTUS) 15 Unit(s) SubCutaneous every morning  insulin glargine Injectable (LANTUS) 7 Unit(s) SubCutaneous at bedtime  insulin lispro (ADMELOG) corrective regimen sliding scale   SubCutaneous three times a day before meals  insulin lispro (ADMELOG) corrective regimen sliding scale   SubCutaneous at bedtime  loratadine 10 milliGRAM(s) Oral daily  melatonin 5 milliGRAM(s) Oral at bedtime  metoclopramide 5 milliGRAM(s) Oral three times a day  metoprolol tartrate 25 milliGRAM(s) Oral two times a day  multivitamin/minerals 1 Tablet(s) Oral daily  naloxegol 25 milliGRAM(s) Oral daily  ondansetron Injectable 4 milliGRAM(s) IV Push every 6 hours PRN  oxybutynin 5 milliGRAM(s) Oral two times a day  oxyCODONE    IR 10 milliGRAM(s) Oral two times a day  pantoprazole    Tablet 40 milliGRAM(s) Oral before breakfast  polyethylene glycol 3350 17 Gram(s) Oral two times a day  saccharomyces boulardii 250 milliGRAM(s) Oral two times a day  senna 2 Tablet(s) Oral at bedtime  sodium chloride 0.65% Nasal 1 Spray(s) Both Nostrils daily  sucralfate 1 Gram(s) Oral two times a day  tiotropium 2.5 MICROgram(s) Inhaler 2 Puff(s) Inhalation daily              REVIEW OF SYSTEMS:  CONSTITUTIONAL: No fever, no weight loss, or no fatigue  NECK: No pain, no stiffness  RESPIRATORY: No cough, no wheezing, no chills, no hemoptysis, No shortness of breath  CARDIOVASCULAR: No chest pain, no palpitations, no dizziness, no leg swelling  GASTROINTESTINAL: No abdominal pain. No nausea, no vomiting, no hematemesis; No diarrhea, no constipation. No melena, no hematochezia.  GENITOURINARY: No dysuria, no frequency, no hematuria, no incontinence  NEUROLOGICAL: No headaches, no loss of strength, no numbness, no tremors  SKIN: No itching, no burning  MUSCULOSKELETAL: No joint pain, no swelling; No muscle, no back, no extremity pain  PSYCHIATRIC: No depression, no mood swings,   HEME/LYMPH: No easy bruising, no bleeding gums  ALLERY AND IMMUNOLOGIC: No hives       Consultant(s) Notes Reviewed:  [X] YES  [ ] NO    PHYSICAL EXAM:  GENERAL: NAD  HEAD:  Atraumatic, Normocephalic  EYES: EOMI, PERRLA, conjunctiva and sclera clear  ENMT: No tonsillar erythema, exudates, or enlargement; Moist mucous membranes  NECK: Supple, No JVD  NERVOUS SYSTEM:  Awake & alert  CHEST/LUNG: Clear to auscultation bilaterally; No rales, rhonchi, wheezing,  HEART: Regular rate and rhythm  ABDOMEN: Soft, Nontender, Nondistended; Bowel sounds present  EXTREMITIES:  No clubbing, cyanosis, or edema  LYMPH: No lymphadenopathy noted  SKIN: No rashes      Advanced care planning discussed with patient/family [X] YES   [ ] NO    Advanced care planning discussed with patient/family. Patient's health status was discussed. All appropriate changes have been made regarding patient's end-of-life care. Advanced care planning forms reviewed/discussed/completed.  20 minutes spent.

## 2024-03-20 NOTE — PROGRESS NOTE ADULT - PROBLEM SELECTOR PLAN 3
2/2 acute on chronic diastolic HF (HFpEF)  On iv lasix 40mg q12h, change to po soon?  Nebs  Oxygen prn  Pulmonary/cardio f/u  Further work-up/management pending clinical course.

## 2024-03-20 NOTE — DIETITIAN INITIAL EVALUATION ADULT - PROBLEM SELECTOR PLAN 3
Unclear etiology  Nebs  Oxygen prn  Pulmonary consult  Further work-up/management pending clinical course.

## 2024-03-20 NOTE — DIETITIAN INITIAL EVALUATION ADULT - NS FNS DIET ORDER
Diet, DASH/TLC:   Sodium & Cholesterol Restricted  Consistent Carbohydrate {No Snacks} (03-17-24 @ 00:13) [Active]

## 2024-03-20 NOTE — CONSULT NOTE ADULT - CONSULT REQUESTED DATE/TIME
17-Mar-2024 08:42
20-Mar-2024 17:18
17-Mar-2024 14:59
17-Mar-2024 07:40
20-Mar-2024 11:45
20-Mar-2024 15:34

## 2024-03-20 NOTE — DIETITIAN INITIAL EVALUATION ADULT - PERTINENT MEDS FT
MEDICATIONS  (STANDING):  apixaban 5 milliGRAM(s) Oral every 12 hours  ascorbic acid 500 milliGRAM(s) Oral daily  aspirin  chewable 81 milliGRAM(s) Oral daily  atorvastatin 40 milliGRAM(s) Oral at bedtime  bisacodyl 10 milliGRAM(s) Oral daily  cholecalciferol 1000 Unit(s) Oral daily  dextrose 5%. 1000 milliLiter(s) (100 mL/Hr) IV Continuous <Continuous>  dextrose 5%. 1000 milliLiter(s) (50 mL/Hr) IV Continuous <Continuous>  dextrose 50% Injectable 25 Gram(s) IV Push once  dextrose 50% Injectable 12.5 Gram(s) IV Push once  dextrose 50% Injectable 25 Gram(s) IV Push once  digoxin     Tablet 250 MICROGram(s) Oral daily  diltiazem    Tablet 90 milliGRAM(s) Oral every 6 hours  ertapenem  IVPB 1000 milliGRAM(s) IV Intermittent every 24 hours  ferrous    sulfate 325 milliGRAM(s) Oral two times a day  furosemide   Injectable 40 milliGRAM(s) IV Push every 12 hours  gabapentin 300 milliGRAM(s) Oral every 12 hours  glucagon  Injectable 1 milliGRAM(s) IntraMuscular once  insulin glargine Injectable (LANTUS) 15 Unit(s) SubCutaneous every morning  insulin glargine Injectable (LANTUS) 7 Unit(s) SubCutaneous at bedtime  insulin lispro (ADMELOG) corrective regimen sliding scale   SubCutaneous three times a day before meals  insulin lispro (ADMELOG) corrective regimen sliding scale   SubCutaneous at bedtime  loratadine 10 milliGRAM(s) Oral daily  melatonin 5 milliGRAM(s) Oral at bedtime  metoclopramide 5 milliGRAM(s) Oral three times a day  metoprolol tartrate 25 milliGRAM(s) Oral two times a day  multivitamin/minerals 1 Tablet(s) Oral daily  naloxegol 25 milliGRAM(s) Oral daily  oxybutynin 5 milliGRAM(s) Oral two times a day  oxyCODONE    IR 10 milliGRAM(s) Oral two times a day  pantoprazole    Tablet 40 milliGRAM(s) Oral before breakfast  polyethylene glycol 3350 17 Gram(s) Oral two times a day  saccharomyces boulardii 250 milliGRAM(s) Oral two times a day  senna 2 Tablet(s) Oral at bedtime  sodium chloride 0.65% Nasal 1 Spray(s) Both Nostrils daily  sucralfate 1 Gram(s) Oral two times a day  tiotropium 2.5 MICROgram(s) Inhaler 2 Puff(s) Inhalation daily    MEDICATIONS  (PRN):  acetaminophen     Tablet .. 650 milliGRAM(s) Oral every 6 hours PRN Temp greater or equal to 38C (100.4F), Mild Pain (1 - 3)  albuterol    0.083% 2.5 milliGRAM(s) Nebulizer every 4 hours PRN Shortness of Breath and/or Wheezing  aluminum hydroxide/magnesium hydroxide/simethicone Suspension 30 milliLiter(s) Oral every 4 hours PRN Dyspepsia  dextrose Oral Gel 15 Gram(s) Oral once PRN Blood Glucose LESS THAN 70 milliGRAM(s)/deciliter  ondansetron Injectable 4 milliGRAM(s) IV Push every 6 hours PRN Nausea and/or Vomiting

## 2024-03-20 NOTE — PROGRESS NOTE ADULT - SUBJECTIVE AND OBJECTIVE BOX
[facility] -- Brittany Lutz Pannella, Patel, Savella, Goodger, Cohen: Office # [phone]    Follow Up: Afib w/ RVR, CAD    Subjective/Observations: Pt seen and examined, awake, alert, resting in bed, denies chest pain, dyspnea, palpitations or dizziness, orthopnea and PND. on O2 via NC. Continues with LE edema. Remains in rate controlled A fib mostly. Has 3 sec pause x 1     REVIEW OF SYSTEMS: All other review of systems are negative unless indicated above    PAST MEDICAL & SURGICAL HISTORY:  Diabetes      Diabetes mellitus with no complication      Afib      Hypertension      Hypertension      BPH (benign prostatic hyperplasia)      Perforated gastric ulcer  s/p emergent ex-lap omentopexy and plication 6/2019      Pulmonary embolism      History of non-ST elevation myocardial infarction (NSTEMI)      Osteomyelitis  s/p debridement      CAD S/P percutaneous coronary angioplasty      Cerebrovascular accident      H/O abdominal surgery      Perforated gastric ulcer      Traumatic amputation of left foot, initial encounter          MEDICATIONS  (STANDING):  apixaban 5 milliGRAM(s) Oral every 12 hours  ascorbic acid 500 milliGRAM(s) Oral daily  aspirin  chewable 81 milliGRAM(s) Oral daily  atorvastatin 40 milliGRAM(s) Oral at bedtime  bisacodyl 10 milliGRAM(s) Oral daily  cholecalciferol 1000 Unit(s) Oral daily  dextrose 5%. 1000 milliLiter(s) (100 mL/Hr) IV Continuous <Continuous>  dextrose 5%. 1000 milliLiter(s) (50 mL/Hr) IV Continuous <Continuous>  dextrose 50% Injectable 25 Gram(s) IV Push once  dextrose 50% Injectable 12.5 Gram(s) IV Push once  dextrose 50% Injectable 25 Gram(s) IV Push once  digoxin     Tablet 250 MICROGram(s) Oral daily  diltiazem    Tablet 90 milliGRAM(s) Oral every 6 hours  ertapenem  IVPB 1000 milliGRAM(s) IV Intermittent every 24 hours  ferrous    sulfate 325 milliGRAM(s) Oral two times a day  furosemide   Injectable 40 milliGRAM(s) IV Push every 12 hours  gabapentin 300 milliGRAM(s) Oral every 12 hours  glucagon  Injectable 1 milliGRAM(s) IntraMuscular once  insulin glargine Injectable (LANTUS) 15 Unit(s) SubCutaneous every morning  insulin glargine Injectable (LANTUS) 7 Unit(s) SubCutaneous at bedtime  insulin lispro (ADMELOG) corrective regimen sliding scale   SubCutaneous three times a day before meals  insulin lispro (ADMELOG) corrective regimen sliding scale   SubCutaneous at bedtime  loratadine 10 milliGRAM(s) Oral daily  melatonin 5 milliGRAM(s) Oral at bedtime  metoclopramide 5 milliGRAM(s) Oral three times a day  metoprolol tartrate 25 milliGRAM(s) Oral two times a day  multivitamin/minerals 1 Tablet(s) Oral daily  naloxegol 25 milliGRAM(s) Oral daily  oxybutynin 5 milliGRAM(s) Oral two times a day  oxyCODONE    IR 10 milliGRAM(s) Oral two times a day  pantoprazole    Tablet 40 milliGRAM(s) Oral before breakfast  polyethylene glycol 3350 17 Gram(s) Oral two times a day  saccharomyces boulardii 250 milliGRAM(s) Oral two times a day  senna 2 Tablet(s) Oral at bedtime  sodium chloride 0.65% Nasal 1 Spray(s) Both Nostrils daily  sucralfate 1 Gram(s) Oral two times a day  tiotropium 2.5 MICROgram(s) Inhaler 2 Puff(s) Inhalation daily    MEDICATIONS  (PRN):  acetaminophen     Tablet .. 650 milliGRAM(s) Oral every 6 hours PRN Temp greater or equal to 38C (100.4F), Mild Pain (1 - 3)  albuterol    0.083% 2.5 milliGRAM(s) Nebulizer every 4 hours PRN Shortness of Breath and/or Wheezing  aluminum hydroxide/magnesium hydroxide/simethicone Suspension 30 milliLiter(s) Oral every 4 hours PRN Dyspepsia  dextrose Oral Gel 15 Gram(s) Oral once PRN Blood Glucose LESS THAN 70 milliGRAM(s)/deciliter  ondansetron Injectable 4 milliGRAM(s) IV Push every 6 hours PRN Nausea and/or Vomiting    Allergies    No Known Drug Allergies  fish (Hives)    Intolerances      Vital Signs Last 24 Hrs  T(C): 36.9 (20 Mar 2024 04:22), Max: 37.2 (19 Mar 2024 10:13)  T(F): 98.4 (20 Mar 2024 04:22), Max: 98.9 (19 Mar 2024 10:13)  HR: 80 (20 Mar 2024 04:22) (71 - 103)  BP: 131/78 (20 Mar 2024 04:22) (114/70 - 138/80)  BP(mean): --  RR: 17 (20 Mar 2024 04:22) (16 - 18)  SpO2: 91% (20 Mar 2024 04:22) (90% - 93%)    Parameters below as of 20 Mar 2024 04:22  Patient On (Oxygen Delivery Method): nasal cannula      I&O's Summary    19 Mar 2024 07:01  -  20 Mar 2024 07:00  --------------------------------------------------------  IN: 240 mL / OUT: 1500 mL / NET: -1260 mL          TELE: A fib 70-90s nonsustained 130s x 1 3 sec pause x 1   PHYSICAL EXAM:  Constitutional: NAD, awake and alert  HEENT: Moist Mucous Membranes, Anicteric  Pulmonary: Non-labored, breath sounds are clear bilaterally, No wheezing, rales or rhonchi  Cardiovascular: Regular, S1 and S2, No murmurs, No rubs, gallops or clicks  Gastrointestinal:  soft, nontender, nondistended   Lymph: +2 peripheral edema Lt> rt. No lymphadenopathy.   Skin: No visible rashes or ulcers.  Psych:  Mood & affect appropriate      LABS: All Labs Reviewed:                        12.1   7.98  )-----------( 194      ( 20 Mar 2024 06:15 )             37.9                         11.4   8.96  )-----------( 186      ( 19 Mar 2024 08:08 )             36.3                         11.4   10.70 )-----------( 173      ( 18 Mar 2024 06:50 )             35.6     20 Mar 2024 06:15    146    |  107    |  17     ----------------------------<  188    3.3     |  32     |  1.20   19 Mar 2024 08:08    145    |  109    |  17     ----------------------------<  176    3.5     |  29     |  1.20   18 Mar 2024 06:50    145    |  111    |  19     ----------------------------<  116    3.7     |  27     |  1.40     Ca    9.1        20 Mar 2024 06:15  Ca    8.7        19 Mar 2024 08:08  Ca    8.2        18 Mar 2024 06:50  Phos  3.1       19 Mar 2024 08:08  Mg     1.7       20 Mar 2024 06:15  Mg     1.9       19 Mar 2024 08:08  Mg     2.2       18 Mar 2024 06:50       Troponin I, High Sensitivity Result: 51.5 ng/L (03-16-24 @ 23:00)  Triiodothyronine, Total (T3 Total): 85 ng/dL (03-17-24 @ 06:40)  Lactate, Blood: 0.9 mmol/L (03-18-24 @ 06:50)    12 Lead ECG:   Ventricular Rate 154 BPM    QRS Duration 76 ms    Q-T Interval 282 ms    QTC Calculation(Bazett) 451 ms    R Axis 15 degrees    T Axis 194 degrees    Diagnosis Line *** Critical Test Result: High HR  Atrial fibrillation with rapid ventricular response  Low voltage QRS  Nonspecific ST and T wave abnormality    Confirmed by JOHN KU ([id number]) on 3/17/2024 7:30:13 AM (03-16-24 @ 23:50)      TRANSTHORACIC ECHOCARDIOGRAM REPORT  ________________________________________________________________________________                                      _______       Pt. Name:       SARAH MORRISON Study Date:    3/18/2024  MRN:            [id number]         YOB: 1970  Accession #:    [id number]        Age:           53 years  Account#:       [id number]       Gender:        M  Heart Rate:                      Height:        74.02 in (188.00 cm)  Rhythm:                          Weight:        244.71 lb (111.00 kg)  Blood Pressure: 100/60 mmHg      BSA/BMI:       2.37 m² / 31.41 kg/m²  ________________________________________________________________________________________  Referring Physician:    [id number] Hill Brizuela  Interpreting Physician: Mary Maya MD  Primary Sonographer:    Mounika FELDMAN    CPT:               ECHO TTE WO CON COMP W DOPP - 70947.m  Indication(s):     Dyspnea, unspecified - R06.00  Procedure:         Transthoracic echocardiogram with 2-D, M-mode and complete                     spectral and color flow Doppler.  Ordering Location: [facility]  Admission Status:  Inpatient  Study Information: Image quality for this study is technically difficult.    _______________________________________________________________________________________     CONCLUSIONS:      1. Technically difficult image quality.   2. Left ventricular cavity is normal in size. Left ventricular wall thickness is normal. Left ventricular systolic function is normal with an ejection fraction visually estimated at 50 to 55 %.   3. Normal right ventricular cavity size, with normal wall thickness, and mildly reduced systolic function.   4. The left atrium is moderately dilated.   5. The right atrium is moderately dilated.   6. Moderate to severe mitral regurgitation.   7. Mild to moderate tricuspid regurgitation.   8. Estimated pulmonary artery systolic pressure is 36 mmHg, consistent with mild pulmonary hypertension.   9. The inferior vena cava is dilated measuring 2.40 cm in diameter, (dilated >2.1cm) with normal inspiratory collapse (normal >50%) consistent with mildly elevated right atrial pressure (~8, range 5-10mmHg).  10. Trace pericardial effusion.    ________________________________________________________________________________________  FINDINGS:     Left Ventricle:  The left ventricular cavity is normal in size. Left ventricular wall thickness is normal. Left ventricular systolic function is normal with an ejection fraction visually estimated at 50 to 55%.The left ventricular diastolic function is indeterminate.     Right Ventricle:  The right ventricular cavity is normal in size, with normal wall thickness and mildly reduced systolic function.     Left Atrium:  The left atrium is moderately dilated.     Right Atrium:  The right atrium is moderately dilated.     Aortic Valve:  The aortic valve anatomy cannot be determined with normal systolic excursion. There is no aortic valve stenosis.     Mitral Valve:  There is mild calcification of the mitralvalve annulus. There is moderate to severe mitral regurgitation.     Tricuspid Valve:  Structurally normal tricuspid valve with normal leaflet excursion. There is mild to moderate tricuspid regurgitation. Estimated pulmonary artery systolic pressure is 36 mmHg, consistent with mild pulmonary hypertension.     Pulmonic Valve:  The pulmonic valve was not well visualized. There is trace pulmonic regurgitation.     Pericardium:  There is a trace pericardial effusion.     Systemic Veins:  The inferiorvena cava is dilated measuring 2.40 cm in diameter, (dilated >2.1cm) with normal inspiratory collapse (normal >50%) consistent with mildly elevated right atrial pressure (~8, range 5-10mmHg).  ____________________________________________________________________  QUANTITATIVE DATA:  Left Ventricle Measurements: (Indexed to BSA)     IVSd (2D):   1.9 cm  LVPWd (2D):  1.5 cm  LVIDd (2D):  5.3 cm  LVIDs (2D):  3.9 cm  LV Mass:     413 g  174.4 g/m²  Visualized LV EF%: 50 to 55%     MV E Vmax:    1.15 m/s  e' lateral:   10.90 cm/s  e' medial:    10.50 cm/s  E/e' lateral: 10.56  E/e' medial:  12.00  E/e' Average: 10.76  MV DT:        137 msec    Aorta Measurements: (Normal range) (Indexed to BSA)     Sinuses of Valsalva: 3.50 cm (3.1 - 3.7 cm)       Left Atrium Measurements: (Indexed to BSA)  LA Diam 2D: 3.99 cm       LVOT / RVOT/ Qp/Qs Data: (Indexed to BSA)  LVOT Diameter: 2.22 cm    Mitral Valve Measurements:     MV E Vmax: 1.2 m/s       Tricuspid Valve Measurements:     TR Vmax:          2.6m/s  TR Peak Gradient: 27.7 mmHg  RA Pressure:      8 mmHg  PASP:             36 mmHg    ________________________________________________________________________________________  Electronically signed on 3/19/2024 at 11:27:59 AM by Mary Maya MD    *** Final ***

## 2024-03-20 NOTE — DIETITIAN INITIAL EVALUATION ADULT - OTHER INFO
Pt is a "54 y/o M PMhx AF on Eliquis, chronic jacques, CAD s/p stents, CVA, DM, HTN, PE who presented w/ SOB, and dislodged jacques as well as dysuria.  also found to have Afib w/ RVR  admitted with  concerns for sepsis, CAUTI, dislodged jacques, reported dysuria."    Visited pt at bedside this afternoon. Pt reports good appetite/intake. Enjoying meals provided. PO intakes % per nursing documentation. Tolerating diet well. Fish allergy noted; confirmed by patient. Denies chewing/swallowing difficulties. Denies N/V/D/C. No BMs thus far; bowel regimen rx. CBW on admission 244#. Pt reports weight gain over the past year; reports previous wt of ~220#. No edema noted. Skin: unstageable pressure ulcer to R heel. Discussed importance of adequate protein intake for wound healing. Pt currently on DASH/TLC, Consistent Carbohydrate diet. Hx of DM, A1c of 7.0% obtained. On metformin and insulin. Provided verbal DM, low Na diet education during visit. Declined written education material at this time. RD remains available and will continue to follow-up.

## 2024-03-20 NOTE — CONSULT NOTE ADULT - SUBJECTIVE AND OBJECTIVE BOX
Patient is a 53y old  Male who presents with a chief complaint of fever (19 Mar 2024 11:26)      Reason For Consult: dm2 uncontrolled    HPI:  This is a 53M with PMH of AF on Eliquis, indwelling jacques, CAD s/p stents, CVA, DM, HTN, osteomyelitis s/p debridement, PE, sent from [facility] for elevated BP, SOB, and dislodged jacques. Patient states he was having difficulty breathing yesterday and that he couldn't catch his breath. Denies feeling feverish, cp, abd pain, vomiting, diarrhea. He was found to be febrile to 103.8, lactate 3.1 --> 3.7. He was also found to be in DEIDRE. He received iv cardizem 10mg ivp x 1 and then was started on iv cardizem 10mg/hr gtt. It was dropped to 5mg/hr overnight due to hypotension. The patient was digoxin loaded due to hypotension and persistent AF with RVR in 140s-150s. (17 Mar 2024 06:22)      PAST MEDICAL & SURGICAL HISTORY:  Diabetes      Diabetes mellitus with no complication      Afib      Hypertension      BPH (benign prostatic hyperplasia)      Perforated gastric ulcer  s/p emergent ex-lap omentopexy and plication 6/2019      Pulmonary embolism      History of non-ST elevation myocardial infarction (NSTEMI)      Osteomyelitis  s/p debridement      CAD S/P percutaneous coronary angioplasty      Cerebrovascular accident      H/O abdominal surgery      Perforated gastric ulcer      Traumatic amputation of left foot, initial encounter          FAMILY HISTORY:  FH: pulmonary embolism  Mother    FH: coronary artery disease  Father    FH: stroke  Father          Social History:    MEDICATIONS  (STANDING):  apixaban 5 milliGRAM(s) Oral every 12 hours  ascorbic acid 500 milliGRAM(s) Oral daily  aspirin  chewable 81 milliGRAM(s) Oral daily  atorvastatin 40 milliGRAM(s) Oral at bedtime  bisacodyl 10 milliGRAM(s) Oral daily  cholecalciferol 1000 Unit(s) Oral daily  dextrose 5%. 1000 milliLiter(s) (100 mL/Hr) IV Continuous <Continuous>  dextrose 5%. 1000 milliLiter(s) (50 mL/Hr) IV Continuous <Continuous>  dextrose 50% Injectable 25 Gram(s) IV Push once  dextrose 50% Injectable 12.5 Gram(s) IV Push once  dextrose 50% Injectable 25 Gram(s) IV Push once  digoxin     Tablet 250 MICROGram(s) Oral daily  diltiazem    Tablet 90 milliGRAM(s) Oral every 6 hours  ertapenem  IVPB 1000 milliGRAM(s) IV Intermittent every 24 hours  ferrous    sulfate 325 milliGRAM(s) Oral two times a day  furosemide   Injectable 40 milliGRAM(s) IV Push every 12 hours  gabapentin 300 milliGRAM(s) Oral every 12 hours  glucagon  Injectable 1 milliGRAM(s) IntraMuscular once  insulin glargine Injectable (LANTUS) 15 Unit(s) SubCutaneous every morning  insulin glargine Injectable (LANTUS) 7 Unit(s) SubCutaneous at bedtime  insulin lispro (ADMELOG) corrective regimen sliding scale   SubCutaneous three times a day before meals  insulin lispro (ADMELOG) corrective regimen sliding scale   SubCutaneous at bedtime  loratadine 10 milliGRAM(s) Oral daily  melatonin 5 milliGRAM(s) Oral at bedtime  metoclopramide 5 milliGRAM(s) Oral three times a day  metoprolol tartrate 25 milliGRAM(s) Oral two times a day  multivitamin/minerals 1 Tablet(s) Oral daily  naloxegol 25 milliGRAM(s) Oral daily  oxybutynin 5 milliGRAM(s) Oral two times a day  oxyCODONE    IR 10 milliGRAM(s) Oral two times a day  pantoprazole    Tablet 40 milliGRAM(s) Oral before breakfast  polyethylene glycol 3350 17 Gram(s) Oral two times a day  potassium chloride    Tablet ER 40 milliEquivalent(s) Oral once  saccharomyces boulardii 250 milliGRAM(s) Oral two times a day  senna 2 Tablet(s) Oral at bedtime  sodium chloride 0.65% Nasal 1 Spray(s) Both Nostrils daily  sucralfate 1 Gram(s) Oral two times a day  tiotropium 2.5 MICROgram(s) Inhaler 2 Puff(s) Inhalation daily    MEDICATIONS  (PRN):  acetaminophen     Tablet .. 650 milliGRAM(s) Oral every 6 hours PRN Temp greater or equal to 38C (100.4F), Mild Pain (1 - 3)  albuterol    0.083% 2.5 milliGRAM(s) Nebulizer every 4 hours PRN Shortness of Breath and/or Wheezing  aluminum hydroxide/magnesium hydroxide/simethicone Suspension 30 milliLiter(s) Oral every 4 hours PRN Dyspepsia  dextrose Oral Gel 15 Gram(s) Oral once PRN Blood Glucose LESS THAN 70 milliGRAM(s)/deciliter  ondansetron Injectable 4 milliGRAM(s) IV Push every 6 hours PRN Nausea and/or Vomiting        T(C): 36.9 (03-20-24 @ 04:22), Max: 36.9 (03-19-24 @ 13:57)  HR: 80 (03-20-24 @ 04:22) (71 - 103)  BP: 131/78 (03-20-24 @ 04:22) (116/76 - 138/80)  RR: 17 (03-20-24 @ 04:22) (16 - 18)  SpO2: 91% (03-20-24 @ 04:22) (90% - 93%)  Wt(kg): --    PHYSICAL EXAM:  GENERAL: NAD, well-groomed, well-developed  HEAD:  Atraumatic, Normocephalic  NECK: Supple, No JVD, Normal thyroid  CHEST/LUNG: Clear to percussion bilaterally; No rales, rhonchi, wheezing, or rubs  HEART: Regular rate and rhythm; No murmurs, rubs, or gallops  ABDOMEN: Soft, Nontender, Nondistended; Bowel sounds present  EXTREMITIES:  2+ Peripheral Pulses, No clubbing, cyanosis, or edema  SKIN: No rashes or lesions    CAPILLARY BLOOD GLUCOSE      POCT Blood Glucose.: 176 mg/dL (20 Mar 2024 08:16)  POCT Blood Glucose.: 209 mg/dL (19 Mar 2024 21:11)  POCT Blood Glucose.: 210 mg/dL (19 Mar 2024 16:28)  POCT Blood Glucose.: 205 mg/dL (19 Mar 2024 12:18)                            12.1   7.98  )-----------( 194      ( 20 Mar 2024 06:15 )             37.9       CMP:  03-20 @ 06:15  SGPT --  Albumin --   Alk Phos --   Anion Gap 7   SGOT --   Total Bili --   BUN 17   Calcium Total 9.1   CO2 32   Chloride 107   Creatinine 1.20   eGFR if AA --   eGFR if non AA --   Glucose 188   Potassium 3.3   Protein --   Sodium 146      Thyroid Function Tests:      Diabetes Tests:       Radiology:

## 2024-03-20 NOTE — PROGRESS NOTE ADULT - ASSESSMENT
53M with PMH of AF on Eliquis, indwelling Aguilera CAD s/p stents, CVA, DM, HTN, osteomyelitis s/p debridement, PE, sent from [facility] for elevated BP, SOB, and dislodged Aguilera, found to be febrile in ER, a/w sepsis and A fib RVR.     Sepsis, Afib RVR, CAD, HTN  - Known A fib, now -150s on admission   - Telemetry reviewed overnight Afib 70-90s mostly, 3 sec pause x 1 3/20  - S/p Cardizem gtt @15 mg/hr, now on Cardizem 60 mg Q6H  - S/p digoxin loaded ( digoxin 500 mcg IV + 250 mg PO) d/t hypotension and persistent AF with RVR in 140s-150s.   - Continue digoxin 250 mg PO daily   - Digoxin level: 1.5  - Continue Metoprolol 25 mg PO BID with hold parameters, can uptitrate if HR remains uncontrolled    - Continue Eliquis for AC, admits compliance    - SOB improving, continuing on O2 supplementation (wean as tolerated) and LE edema   - TTE (12/2023) normal LV & RV size and function EF 55-60%, indeterminate DD, mod dilated LA and RA, mod MR, mild TR, PASP 31  - Technically difficult ECHO w/ normal LV size and function EF 50-55%, mildly reduced RV systolic function, mod dilated LA and RA, mod to sev MR, mild to mod TR, PASP 36  - BNP 7622  - Mild vol ol on examination with LE edema   - Creatinine:  <--1.20,  <--1.20,  <--1.40  - Continue Lasix 40 mg IV BID    - EKG: A fib RVR @ 154  - Troponin: <-51.5, no trend there after   - No evidence of any active ischemia   - Continue aspirin and statin     - BP stable and controlled   - Continue to hold home Imdur and losartan to allow room for AV nodals     - Sepsis likely 2/2 UTI  - Management per primary team     - Monitor and replete lytes, keep K>4, Mg>2.  - Will continue to follow.    Danny Arce, MS FNP, AGACNP  Nurse Practitioner- Cardiology   Please call on TEAMS 53M with PMH of AF on Eliquis, indwelling Aguilera CAD s/p stents, CVA, DM, HTN, osteomyelitis s/p debridement, PE, sent from [facility] for elevated BP, SOB, and dislodged Aguilera, found to be febrile in ER, a/w sepsis and A fib RVR.     Sepsis, Afib RVR, CAD, HTN  - Known A fib, now -150s on admission   - Telemetry reviewed overnight Afib 70-90s mostly, 3 sec pause x 1 3/20  - S/p Cardizem gtt @15 mg/hr, now on Cardizem 60 mg Q6H  - S/p digoxin loaded ( digoxin 500 mcg IV + 250 mg PO) d/t hypotension and persistent AF with RVR in 140s-150s.   - Continue digoxin 250 mg PO daily   - Digoxin level: 1.5. monitor levels intervally  - Continue Metoprolol 25 mg PO BID with hold parameters, can uptitrate if HR remains uncontrolled    - Continue Eliquis for AC, admits compliance    - SOB improving, continuing on O2 supplementation (wean as tolerated) and LE edema   - TTE (12/2023) normal LV & RV size and function EF 55-60%, indeterminate DD, mod dilated LA and RA, mod MR, mild TR, PASP 31  - Technically difficult ECHO w/ normal LV size and function EF 50-55%, mildly reduced RV systolic function, mod dilated LA and RA, mod to sev MR, mild to mod TR, PASP 36  - BNP 7622  - Mild vol ol on examination with LE edema   - Creatinine:  <--1.20,  <--1.20,  <--1.40  - Continue Lasix 40 mg IV BID    - EKG: A fib RVR @ 154  - Troponin: <-51.5, no trend there after   - No evidence of any active ischemia   - Continue aspirin and statin     - BP stable and controlled   - Continue to hold home Imdur and losartan to allow room for AV nodals     - Sepsis likely 2/2 UTI  - Management per primary team     - Monitor and replete lytes, keep K>4, Mg>2.  - Will continue to follow.    Danny Arce, MS FNP, AGACNP  Nurse Practitioner- Cardiology   Please call on TEAMS

## 2024-03-20 NOTE — CONSULT NOTE ADULT - CONSULT REASON
CAUTI
SOB, A fib RVR
dm2 uncontrolled
Right diabetic heel ulcer
dyspnea  hypoxemia
Unstagable posterior pressure ulcer right heel

## 2024-03-20 NOTE — DIETITIAN INITIAL EVALUATION ADULT - PROBLEM SELECTOR PLAN 1
Admit  Septic shock likely 2/2 to CAUTI (jacques changed in ER)  Pan-culture  Continue iv Zosyn   Aggressive IVF  Trend WBC, lactate  F/U culture data  ID consult  Further work-up/management pending clinical course.

## 2024-03-20 NOTE — CONSULT NOTE ADULT - PROBLEM SELECTOR RECOMMENDATION 9
MRI , X rays , off loading of the right heel
cont current mdii  change admelog corrective scale coverage qac/qhs  cont cons cho diet  goal bg 100-180 in hosp setting

## 2024-03-20 NOTE — PROGRESS NOTE ADULT - ASSESSMENT
52 y/o M PMhx AF on Eliquis, chronic jacques, CAD s/p stents, CVA, DM, HTN, PE who presented w/ SOB, and dislodged jacques as well as dysuria.  also found to have Afib w/ RVR  admitted with  concerns for sepsis, CAUTI, dislodged jacques  reported dysuria  jacques placed in ED  CT abd/pelvis- No hydronephrosis or nephrolithiasis.    Recommendations  1-Bacteremia ESBL E coli (suspected pyelonephritis)   -  Escherichia coli: Detec   -  ESBL: Detec   -  CTX-M Resistance Marker: Detec  ertapenem started 3/17 continue for now,   Repeat blood cultures collected 3/18 5am - NGTD past 48 hours  ordered midline and ertapenem x 10 days can be completed in outpt setting  no labs required and midline can be removed at end of Rx  Culture - Urine (03.16.24 @ 22:35)  >100,000 CFU/ml Escherichia coli ESBL    Thank you for consulting us and involving us in the management of this most interesting and challenging case.  We will follow along in the care of this patient. Please call us at [phone] or text me directly on my cell# at [phone] with any concerns.

## 2024-03-20 NOTE — DIETITIAN INITIAL EVALUATION ADULT - ORAL INTAKE PTA/DIET HISTORY
Pt admitted from [facility]. Reviewed transfer documents, pt was on a NCS, low Na diet PTA. Pt states that he does not add salt to foods. Tries to avoid high salt food items (lagunas, sausage, etc.). Does not consume regular soda/juice. Has been cutting down on portion of carbohydrates.

## 2024-03-20 NOTE — CONSULT NOTE ADULT - SUBJECTIVE AND OBJECTIVE BOX
Chief Complaint: Unstagable pressure ulcer posterior right heel , complicated by diabetes     HPI: Patient was admitted for difficulty breathing     PAST MEDICAL & SURGICAL HISTORY:  Diabetes      Diabetes mellitus with no complication      Afib      Hypertension      BPH (benign prostatic hyperplasia)      Perforated gastric ulcer  s/p emergent ex-lap omentopexy and plication 6/2019      Pulmonary embolism      History of non-ST elevation myocardial infarction (NSTEMI)      Osteomyelitis  s/p debridement      CAD S/P percutaneous coronary angioplasty      Cerebrovascular accident      H/O abdominal surgery      Perforated gastric ulcer      Traumatic amputation of left foot, initial encounter          Allergies    No Known Drug Allergies  fish (Hives)    Intolerances        MEDICATIONS  (STANDING):  apixaban 5 milliGRAM(s) Oral every 12 hours  ascorbic acid 500 milliGRAM(s) Oral daily  aspirin  chewable 81 milliGRAM(s) Oral daily  atorvastatin 40 milliGRAM(s) Oral at bedtime  bisacodyl 10 milliGRAM(s) Oral daily  cholecalciferol 1000 Unit(s) Oral daily  dextrose 5%. 1000 milliLiter(s) (100 mL/Hr) IV Continuous <Continuous>  dextrose 5%. 1000 milliLiter(s) (50 mL/Hr) IV Continuous <Continuous>  dextrose 50% Injectable 25 Gram(s) IV Push once  dextrose 50% Injectable 12.5 Gram(s) IV Push once  dextrose 50% Injectable 25 Gram(s) IV Push once  digoxin     Tablet 250 MICROGram(s) Oral daily  diltiazem    Tablet 90 milliGRAM(s) Oral every 6 hours  ertapenem  IVPB 1000 milliGRAM(s) IV Intermittent every 24 hours  ferrous    sulfate 325 milliGRAM(s) Oral two times a day  furosemide   Injectable 40 milliGRAM(s) IV Push every 12 hours  gabapentin 300 milliGRAM(s) Oral every 12 hours  glucagon  Injectable 1 milliGRAM(s) IntraMuscular once  insulin glargine Injectable (LANTUS) 15 Unit(s) SubCutaneous every morning  insulin glargine Injectable (LANTUS) 7 Unit(s) SubCutaneous at bedtime  insulin lispro (ADMELOG) corrective regimen sliding scale   SubCutaneous three times a day before meals  insulin lispro (ADMELOG) corrective regimen sliding scale   SubCutaneous at bedtime  loratadine 10 milliGRAM(s) Oral daily  melatonin 5 milliGRAM(s) Oral at bedtime  metoclopramide 5 milliGRAM(s) Oral three times a day  metoprolol tartrate 25 milliGRAM(s) Oral two times a day  multivitamin/minerals 1 Tablet(s) Oral daily  naloxegol 25 milliGRAM(s) Oral daily  oxybutynin 5 milliGRAM(s) Oral two times a day  oxyCODONE    IR 10 milliGRAM(s) Oral two times a day  pantoprazole    Tablet 40 milliGRAM(s) Oral before breakfast  polyethylene glycol 3350 17 Gram(s) Oral two times a day  saccharomyces boulardii 250 milliGRAM(s) Oral two times a day  senna 2 Tablet(s) Oral at bedtime  sodium chloride 0.65% Nasal 1 Spray(s) Both Nostrils daily  sucralfate 1 Gram(s) Oral two times a day  tiotropium 2.5 MICROgram(s) Inhaler 2 Puff(s) Inhalation daily    MEDICATIONS  (PRN):  acetaminophen     Tablet .. 650 milliGRAM(s) Oral every 6 hours PRN Temp greater or equal to 38C (100.4F), Mild Pain (1 - 3)  albuterol    0.083% 2.5 milliGRAM(s) Nebulizer every 4 hours PRN Shortness of Breath and/or Wheezing  aluminum hydroxide/magnesium hydroxide/simethicone Suspension 30 milliLiter(s) Oral every 4 hours PRN Dyspepsia  dextrose Oral Gel 15 Gram(s) Oral once PRN Blood Glucose LESS THAN 70 milliGRAM(s)/deciliter  ondansetron Injectable 4 milliGRAM(s) IV Push every 6 hours PRN Nausea and/or Vomiting      FAMILY HISTORY:  FH: pulmonary embolism  Mother    FH: coronary artery disease  Father    FH: stroke  Father        SOCIAL HISTORY:     ROS:  CONSTITUTIONAL: No fever, weight loss, or fatigue  RESPIRATORY: No respiratory distress  CARDIOVASCULAR: No chest pain, palpitations, dizziness, or leg swelling  GASTROINTESTINAL: No abdominal pain. No nausea, vomiting.  NEUROLOGICAL: No headaches, memory loss, loss of strength, numbness, or tremors  SKIN: Diabetic pressure ulcer posterior right heel 4cm x 4 cm   ENDOCRINE: IDDM  MUSCULOSKELETAL: cavus foot deformity bilateral   ALLERGY AND IMMUNOLOGIC: No hives or eczema    Vital Signs Last 24 Hrs  T(C): 36.7 (20 Mar 2024 12:06), Max: 36.9 (20 Mar 2024 04:22)  T(F): 98 (20 Mar 2024 12:06), Max: 98.4 (20 Mar 2024 04:22)  HR: 66 (20 Mar 2024 12:06) (66 - 103)  BP: 122/75 (20 Mar 2024 12:06) (122/75 - 138/80)  BP(mean): --  RR: 18 (20 Mar 2024 12:06) (17 - 18)  SpO2: 94% (20 Mar 2024 12:06) (91% - 94%)    Parameters below as of 20 Mar 2024 12:06  Patient On (Oxygen Delivery Method): nasal cannula  O2 Flow (L/min): 2      PHYSICAL EXAM-    Constitutional: well developed, well nourished, no apparent distress, alert, oriented x 3.  Focused Lower Extremity Physical Examination:   Vascular: Weakly palpable pulses bilateral   Neurologic: IDDM with neuropathy   Dermatologic:unstagable pressure ulcer posterior right heel    Muskculoskeletal: bilateral cavus foot deformity       RADIOLOGY                          12.1   7.98  )-----------( 194      ( 20 Mar 2024 06:15 )             37.9     03-20    146<H>  |  107  |  17  ----------------------------<  188<H>  3.3<L>   |  32<H>  |  1.20    Ca    9.1      20 Mar 2024 06:15  Phos  3.1     03-19  Mg     1.7     03-20

## 2024-03-20 NOTE — PROGRESS NOTE ADULT - SUBJECTIVE AND OBJECTIVE BOX
[facility]  Juventino Whitten MD PhD, Symone Hickey MD, Anne-Marie Li MD, Jeffery Jacobs MD, Ryan Romeo MD  and providing coverage with Melody Armstrong MD  Providing Infectious Disease Consultations at [facility], [facility], [facility]'s    Office# [phone] to schedule follow up appointments  Answering Service for urgent calls or New Consults [phone]  Cell# to text for urgent issues Juventino Whitten [phone]     infectious diseases progress note:    SARAH MORRISON is a 53y y. o. Male patient    Overnight and events of the last 24hrs reviewed    Allergies    No Known Drug Allergies  fish (Hives)    Intolerances        ANTIBIOTICS/RELEVANT:  antimicrobials  ertapenem  IVPB 1000 milliGRAM(s) IV Intermittent every 24 hours    immunologic:    OTHER:  acetaminophen     Tablet .. 650 milliGRAM(s) Oral every 6 hours PRN  albuterol    0.083% 2.5 milliGRAM(s) Nebulizer every 4 hours PRN  aluminum hydroxide/magnesium hydroxide/simethicone Suspension 30 milliLiter(s) Oral every 4 hours PRN  apixaban 5 milliGRAM(s) Oral every 12 hours  ascorbic acid 500 milliGRAM(s) Oral daily  aspirin  chewable 81 milliGRAM(s) Oral daily  atorvastatin 40 milliGRAM(s) Oral at bedtime  bisacodyl 10 milliGRAM(s) Oral daily  cholecalciferol 1000 Unit(s) Oral daily  dextrose 5%. 1000 milliLiter(s) IV Continuous <Continuous>  dextrose 5%. 1000 milliLiter(s) IV Continuous <Continuous>  dextrose 50% Injectable 25 Gram(s) IV Push once  dextrose 50% Injectable 12.5 Gram(s) IV Push once  dextrose 50% Injectable 25 Gram(s) IV Push once  dextrose Oral Gel 15 Gram(s) Oral once PRN  digoxin     Tablet 250 MICROGram(s) Oral daily  diltiazem    Tablet 90 milliGRAM(s) Oral every 6 hours  ferrous    sulfate 325 milliGRAM(s) Oral two times a day  furosemide   Injectable 40 milliGRAM(s) IV Push every 12 hours  gabapentin 300 milliGRAM(s) Oral every 12 hours  glucagon  Injectable 1 milliGRAM(s) IntraMuscular once  insulin glargine Injectable (LANTUS) 15 Unit(s) SubCutaneous every morning  insulin glargine Injectable (LANTUS) 7 Unit(s) SubCutaneous at bedtime  insulin lispro (ADMELOG) corrective regimen sliding scale   SubCutaneous three times a day before meals  insulin lispro (ADMELOG) corrective regimen sliding scale   SubCutaneous at bedtime  loratadine 10 milliGRAM(s) Oral daily  melatonin 5 milliGRAM(s) Oral at bedtime  metoclopramide 5 milliGRAM(s) Oral three times a day  metoprolol tartrate 25 milliGRAM(s) Oral two times a day  multivitamin/minerals 1 Tablet(s) Oral daily  naloxegol 25 milliGRAM(s) Oral daily  ondansetron Injectable 4 milliGRAM(s) IV Push every 6 hours PRN  oxybutynin 5 milliGRAM(s) Oral two times a day  oxyCODONE    IR 10 milliGRAM(s) Oral two times a day  pantoprazole    Tablet 40 milliGRAM(s) Oral before breakfast  polyethylene glycol 3350 17 Gram(s) Oral two times a day  saccharomyces boulardii 250 milliGRAM(s) Oral two times a day  senna 2 Tablet(s) Oral at bedtime  sodium chloride 0.65% Nasal 1 Spray(s) Both Nostrils daily  sucralfate 1 Gram(s) Oral two times a day  tiotropium 2.5 MICROgram(s) Inhaler 2 Puff(s) Inhalation daily      Objective:  Vital Signs Last 24 Hrs  T(C): 36.7 (20 Mar 2024 12:06), Max: 36.9 (20 Mar 2024 04:22)  T(F): 98 (20 Mar 2024 12:06), Max: 98.4 (20 Mar 2024 04:22)  HR: 66 (20 Mar 2024 12:06) (66 - 103)  BP: 122/75 (20 Mar 2024 12:06) (122/75 - 138/80)  BP(mean): --  RR: 18 (20 Mar 2024 12:06) (17 - 18)  SpO2: 94% (20 Mar 2024 12:06) (91% - 94%)    Parameters below as of 20 Mar 2024 12:06  Patient On (Oxygen Delivery Method): nasal cannula  O2 Flow (L/min): 2      T(C): 36.7 (03-20-24 @ 12:06), Max: 37.2 (03-19-24 @ 10:13)  T(C): 36.7 (03-20-24 @ 12:06), Max: 38.9 (03-17-24 @ 19:56)  T(C): 36.7 (03-20-24 @ 12:06), Max: 39.9 (03-16-24 @ 22:56)    PHYSICAL EXAM:  HEENT: NC atraumatic  Neck: supple  Respiratory: no accessory muscle use, breathing comfortably  Cardiovascular: distant  Gastrointestinal: normal appearing, nondistended  Extremities: no clubbing, no cyanosis,        LABS:                          12.1   7.98  )-----------( 194      ( 20 Mar 2024 06:15 )             37.9       WBC  7.98 03-20 @ 06:15  8.96 03-19 @ 08:08  10.70 03-18 @ 06:50  19.30 03-17 @ 06:41  3.90 03-16 @ 23:00      03-20    146<H>  |  107  |  17  ----------------------------<  188<H>  3.3<L>   |  32<H>  |  1.20    Ca    9.1      20 Mar 2024 06:15  Phos  3.1     03-19  Mg     1.7     03-20        Creatinine: 1.20 mg/dL (03-20-24 @ 06:15)  Creatinine: 1.20 mg/dL (03-19-24 @ 08:08)  Creatinine: 1.40 mg/dL (03-18-24 @ 06:50)  Creatinine: 1.20 mg/dL (03-17-24 @ 06:41)  Creatinine: 1.30 mg/dL (03-16-24 @ 23:00)        Urinalysis Basic - ( 20 Mar 2024 06:15 )    Color: x / Appearance: x / SG: x / pH: x  Gluc: 188 mg/dL / Ketone: x  / Bili: x / Urobili: x   Blood: x / Protein: x / Nitrite: x   Leuk Esterase: x / RBC: x / WBC x   Sq Epi: x / Non Sq Epi: x / Bacteria: x            INFLAMMATORY MARKERS      MICROBIOLOGY:              RADIOLOGY & ADDITIONAL STUDIES:

## 2024-03-21 LAB
ANION GAP SERPL CALC-SCNC: 6 MMOL/L — SIGNIFICANT CHANGE UP (ref 5–17)
BUN SERPL-MCNC: 17 MG/DL — SIGNIFICANT CHANGE UP (ref 7–23)
CALCIUM SERPL-MCNC: 9.3 MG/DL — SIGNIFICANT CHANGE UP (ref 8.5–10.1)
CHLORIDE SERPL-SCNC: 107 MMOL/L — SIGNIFICANT CHANGE UP (ref 96–108)
CO2 SERPL-SCNC: 33 MMOL/L — HIGH (ref 22–31)
CREAT SERPL-MCNC: 1.1 MG/DL — SIGNIFICANT CHANGE UP (ref 0.5–1.3)
EGFR: 79 ML/MIN/1.73M2 — SIGNIFICANT CHANGE UP
GLUCOSE BLDC GLUCOMTR-MCNC: 195 MG/DL — HIGH (ref 70–99)
GLUCOSE BLDC GLUCOMTR-MCNC: 202 MG/DL — HIGH (ref 70–99)
GLUCOSE BLDC GLUCOMTR-MCNC: 211 MG/DL — HIGH (ref 70–99)
GLUCOSE BLDC GLUCOMTR-MCNC: 229 MG/DL — HIGH (ref 70–99)
GLUCOSE SERPL-MCNC: 217 MG/DL — HIGH (ref 70–99)
MAGNESIUM SERPL-MCNC: 1.6 MG/DL — SIGNIFICANT CHANGE UP (ref 1.6–2.6)
POTASSIUM SERPL-MCNC: 4.1 MMOL/L — SIGNIFICANT CHANGE UP (ref 3.5–5.3)
POTASSIUM SERPL-SCNC: 4.1 MMOL/L — SIGNIFICANT CHANGE UP (ref 3.5–5.3)
SODIUM SERPL-SCNC: 146 MMOL/L — HIGH (ref 135–145)

## 2024-03-21 PROCEDURE — 36000 PLACE NEEDLE IN VEIN: CPT

## 2024-03-21 PROCEDURE — 76937 US GUIDE VASCULAR ACCESS: CPT | Mod: 26

## 2024-03-21 PROCEDURE — 36573 INSJ PICC RS&I 5 YR+: CPT

## 2024-03-21 PROCEDURE — 99232 SBSQ HOSP IP/OBS MODERATE 35: CPT

## 2024-03-21 RX ORDER — FUROSEMIDE 40 MG
40 TABLET ORAL
Refills: 0 | Status: DISCONTINUED | OUTPATIENT
Start: 2024-03-22 | End: 2024-03-26

## 2024-03-21 RX ADMIN — OXYCODONE HYDROCHLORIDE 10 MILLIGRAM(S): 5 TABLET ORAL at 18:36

## 2024-03-21 RX ADMIN — Medication 4: at 12:40

## 2024-03-21 RX ADMIN — Medication 90 MILLIGRAM(S): at 00:58

## 2024-03-21 RX ADMIN — SENNA PLUS 2 TABLET(S): 8.6 TABLET ORAL at 21:30

## 2024-03-21 RX ADMIN — PANTOPRAZOLE SODIUM 40 MILLIGRAM(S): 20 TABLET, DELAYED RELEASE ORAL at 05:16

## 2024-03-21 RX ADMIN — Medication 40 MILLIGRAM(S): at 05:15

## 2024-03-21 RX ADMIN — Medication 90 MILLIGRAM(S): at 05:15

## 2024-03-21 RX ADMIN — Medication 5 MILLIGRAM(S): at 21:30

## 2024-03-21 RX ADMIN — Medication 5 MILLIGRAM(S): at 05:14

## 2024-03-21 RX ADMIN — Medication 250 MILLIGRAM(S): at 05:14

## 2024-03-21 RX ADMIN — Medication 25 MILLIGRAM(S): at 05:13

## 2024-03-21 RX ADMIN — APIXABAN 5 MILLIGRAM(S): 2.5 TABLET, FILM COATED ORAL at 05:14

## 2024-03-21 RX ADMIN — INSULIN GLARGINE 15 UNIT(S): 100 INJECTION, SOLUTION SUBCUTANEOUS at 08:10

## 2024-03-21 RX ADMIN — Medication 2: at 17:15

## 2024-03-21 RX ADMIN — Medication 5 MILLIGRAM(S): at 17:35

## 2024-03-21 RX ADMIN — Medication 1 TABLET(S): at 11:20

## 2024-03-21 RX ADMIN — OXYCODONE HYDROCHLORIDE 10 MILLIGRAM(S): 5 TABLET ORAL at 05:13

## 2024-03-21 RX ADMIN — Medication 90 MILLIGRAM(S): at 11:20

## 2024-03-21 RX ADMIN — Medication 1 GRAM(S): at 05:13

## 2024-03-21 RX ADMIN — GABAPENTIN 300 MILLIGRAM(S): 400 CAPSULE ORAL at 17:35

## 2024-03-21 RX ADMIN — ATORVASTATIN CALCIUM 40 MILLIGRAM(S): 80 TABLET, FILM COATED ORAL at 21:30

## 2024-03-21 RX ADMIN — Medication 90 MILLIGRAM(S): at 17:34

## 2024-03-21 RX ADMIN — Medication 10 MILLIGRAM(S): at 11:21

## 2024-03-21 RX ADMIN — Medication 500 MILLIGRAM(S): at 11:20

## 2024-03-21 RX ADMIN — LORATADINE 10 MILLIGRAM(S): 10 TABLET ORAL at 11:20

## 2024-03-21 RX ADMIN — Medication 250 MILLIGRAM(S): at 17:34

## 2024-03-21 RX ADMIN — Medication 5 MILLIGRAM(S): at 13:14

## 2024-03-21 RX ADMIN — Medication 81 MILLIGRAM(S): at 11:20

## 2024-03-21 RX ADMIN — Medication 4: at 08:09

## 2024-03-21 RX ADMIN — Medication 1 GRAM(S): at 17:35

## 2024-03-21 RX ADMIN — INSULIN GLARGINE 7 UNIT(S): 100 INJECTION, SOLUTION SUBCUTANEOUS at 21:30

## 2024-03-21 RX ADMIN — NALOXEGOL OXALATE 25 MILLIGRAM(S): 12.5 TABLET, FILM COATED ORAL at 11:21

## 2024-03-21 RX ADMIN — Medication 325 MILLIGRAM(S): at 17:34

## 2024-03-21 RX ADMIN — Medication 325 MILLIGRAM(S): at 05:13

## 2024-03-21 RX ADMIN — GABAPENTIN 300 MILLIGRAM(S): 400 CAPSULE ORAL at 05:13

## 2024-03-21 RX ADMIN — OXYCODONE HYDROCHLORIDE 10 MILLIGRAM(S): 5 TABLET ORAL at 18:11

## 2024-03-21 RX ADMIN — TIOTROPIUM BROMIDE 2 PUFF(S): 18 CAPSULE ORAL; RESPIRATORY (INHALATION) at 06:59

## 2024-03-21 RX ADMIN — Medication 1000 UNIT(S): at 11:20

## 2024-03-21 RX ADMIN — ERTAPENEM SODIUM 120 MILLIGRAM(S): 1 INJECTION, POWDER, LYOPHILIZED, FOR SOLUTION INTRAMUSCULAR; INTRAVENOUS at 21:31

## 2024-03-21 RX ADMIN — POLYETHYLENE GLYCOL 3350 17 GRAM(S): 17 POWDER, FOR SOLUTION ORAL at 05:13

## 2024-03-21 RX ADMIN — Medication 250 MICROGRAM(S): at 05:13

## 2024-03-21 RX ADMIN — OXYCODONE HYDROCHLORIDE 10 MILLIGRAM(S): 5 TABLET ORAL at 05:43

## 2024-03-21 RX ADMIN — APIXABAN 5 MILLIGRAM(S): 2.5 TABLET, FILM COATED ORAL at 17:34

## 2024-03-21 RX ADMIN — Medication 25 MILLIGRAM(S): at 17:34

## 2024-03-21 NOTE — PROGRESS NOTE ADULT - SUBJECTIVE AND OBJECTIVE BOX
[facility]OUS DISE[facility]  Juventino Whitten MD PhD, Symone Hickey MD, Anne-Marie Li MD, Jeffery Jacobs MD, Ryan Romeo MD  and providing coverage with Melody Armstrong MD  Providing Infectious Disease Consultations at [facility], [facility], [facility]'s    Office# [phone] to schedule follow up appointments  Answering Service for urgent calls or New Consults [phone]  Cell# to text for urgent issues Juventino Whitten [phone]     infectious diseases progress note:    SARAH MORRISON is a 53y y. o. Male patient    Overnight and events of the last 24hrs reviewed    Allergies    No Known Drug Allergies  fish (Hives)    Intolerances        ANTIBIOTICS/RELEVANT:  antimicrobials  ertapenem  IVPB 1000 milliGRAM(s) IV Intermittent every 24 hours    immunologic:    OTHER:  acetaminophen     Tablet .. 650 milliGRAM(s) Oral every 6 hours PRN  albuterol    0.083% 2.5 milliGRAM(s) Nebulizer every 4 hours PRN  aluminum hydroxide/magnesium hydroxide/simethicone Suspension 30 milliLiter(s) Oral every 4 hours PRN  apixaban 5 milliGRAM(s) Oral every 12 hours  ascorbic acid 500 milliGRAM(s) Oral daily  aspirin  chewable 81 milliGRAM(s) Oral daily  atorvastatin 40 milliGRAM(s) Oral at bedtime  bisacodyl 10 milliGRAM(s) Oral daily  cholecalciferol 1000 Unit(s) Oral daily  dextrose 5%. 1000 milliLiter(s) IV Continuous <Continuous>  dextrose 5%. 1000 milliLiter(s) IV Continuous <Continuous>  dextrose 50% Injectable 25 Gram(s) IV Push once  dextrose 50% Injectable 12.5 Gram(s) IV Push once  dextrose 50% Injectable 25 Gram(s) IV Push once  dextrose Oral Gel 15 Gram(s) Oral once PRN  digoxin     Tablet 250 MICROGram(s) Oral daily  diltiazem    Tablet 90 milliGRAM(s) Oral every 6 hours  ferrous    sulfate 325 milliGRAM(s) Oral two times a day  furosemide   Injectable 40 milliGRAM(s) IV Push every 12 hours  gabapentin 300 milliGRAM(s) Oral every 12 hours  glucagon  Injectable 1 milliGRAM(s) IntraMuscular once  insulin glargine Injectable (LANTUS) 15 Unit(s) SubCutaneous every morning  insulin glargine Injectable (LANTUS) 7 Unit(s) SubCutaneous at bedtime  insulin lispro (ADMELOG) corrective regimen sliding scale   SubCutaneous three times a day before meals  insulin lispro (ADMELOG) corrective regimen sliding scale   SubCutaneous at bedtime  loratadine 10 milliGRAM(s) Oral daily  melatonin 5 milliGRAM(s) Oral at bedtime  metoclopramide 5 milliGRAM(s) Oral three times a day  metoprolol tartrate 25 milliGRAM(s) Oral two times a day  multivitamin/minerals 1 Tablet(s) Oral daily  naloxegol 25 milliGRAM(s) Oral daily  ondansetron Injectable 4 milliGRAM(s) IV Push every 6 hours PRN  oxybutynin 5 milliGRAM(s) Oral two times a day  oxyCODONE    IR 10 milliGRAM(s) Oral two times a day  pantoprazole    Tablet 40 milliGRAM(s) Oral before breakfast  polyethylene glycol 3350 17 Gram(s) Oral two times a day  saccharomyces boulardii 250 milliGRAM(s) Oral two times a day  senna 2 Tablet(s) Oral at bedtime  sodium chloride 0.65% Nasal 1 Spray(s) Both Nostrils daily  sucralfate 1 Gram(s) Oral two times a day  tiotropium 2.5 MICROgram(s) Inhaler 2 Puff(s) Inhalation daily      Objective:  Vital Signs Last 24 Hrs  T(C): 36.8 (21 Mar 2024 04:28), Max: 36.8 (21 Mar 2024 04:28)  T(F): 98.3 (21 Mar 2024 04:28), Max: 98.3 (21 Mar 2024 04:28)  HR: 87 (21 Mar 2024 04:28) (62 - 88)  BP: 118/69 (21 Mar 2024 04:28) (118/69 - 128/61)  BP(mean): --  RR: 18 (21 Mar 2024 04:28) (18 - 19)  SpO2: 91% (21 Mar 2024 04:28) (91% - 94%)    Parameters below as of 21 Mar 2024 04:28  Patient On (Oxygen Delivery Method): nasal cannula  O2 Flow (L/min): 2      T(C): 36.8 (03-21-24 @ 04:28), Max: 36.9 (03-19-24 @ 13:57)  T(C): 36.8 (03-21-24 @ 04:28), Max: 37.2 (03-19-24 @ 10:13)  T(C): 36.8 (03-21-24 @ 04:28), Max: 38.9 (03-17-24 @ 19:56)    PHYSICAL EXAM:  HEENT: NC atraumatic  Neck: supple  Respiratory: no accessory muscle use, breathing comfortably  Cardiovascular: distant  Gastrointestinal: normal appearing, nondistended  Extremities: no clubbing, no cyanosis,        LABS:                          12.1   7.98  )-----------( 194      ( 20 Mar 2024 06:15 )             37.9       WBC  7.98 03-20 @ 06:15  8.96 03-19 @ 08:08  10.70 03-18 @ 06:50  19.30 03-17 @ 06:41  3.90 03-16 @ 23:00      03-21    146<H>  |  107  |  17  ----------------------------<  217<H>  4.1   |  33<H>  |  1.10    Ca    9.3      21 Mar 2024 08:27  Mg     1.6     03-21        Creatinine: 1.10 mg/dL (03-21-24 @ 08:27)  Creatinine: 1.20 mg/dL (03-20-24 @ 06:15)  Creatinine: 1.20 mg/dL (03-19-24 @ 08:08)  Creatinine: 1.40 mg/dL (03-18-24 @ 06:50)  Creatinine: 1.20 mg/dL (03-17-24 @ 06:41)  Creatinine: 1.30 mg/dL (03-16-24 @ 23:00)        Urinalysis Basic - ( 21 Mar 2024 08:27 )    Color: x / Appearance: x / SG: x / pH: x  Gluc: 217 mg/dL / Ketone: x  / Bili: x / Urobili: x   Blood: x / Protein: x / Nitrite: x   Leuk Esterase: x / RBC: x / WBC x   Sq Epi: x / Non Sq Epi: x / Bacteria: x            INFLAMMATORY MARKERS      MICROBIOLOGY:        RADIOLOGY & ADDITIONAL STUDIES:

## 2024-03-21 NOTE — PROCEDURE NOTE - ADDITIONAL PROCEDURE DETAILS
15cm bard power midline inserted into patent right basilic vein under sterile conditions and ultrasound guidance.  Midline catheter can be accessed.

## 2024-03-21 NOTE — PROGRESS NOTE ADULT - SUBJECTIVE AND OBJECTIVE BOX
Date/Time Patient Seen:  		  Referring MD:   Data Reviewed	       Patient is a 53y old  Male who presents with a chief complaint of difficultly breathing (20 Mar 2024 15:20)      Subjective/HPI     PAST MEDICAL & SURGICAL HISTORY:  No pertinent past medical history    Diabetes    No pertinent past medical history    Diabetes mellitus with no complication    Afib    Hypertension    BPH (benign prostatic hyperplasia)    Perforated gastric ulcer  s/p emergent ex-lap omentopexy and plication 6/2019    Pulmonary embolism    History of non-ST elevation myocardial infarction (NSTEMI)    Osteomyelitis  s/p debridement    CAD S/P percutaneous coronary angioplasty    Cerebrovascular accident    No significant past surgical history    No significant past surgical history    H/O abdominal surgery    Perforated gastric ulcer    Traumatic amputation of left foot, initial encounter          Medication list         MEDICATIONS  (STANDING):  apixaban 5 milliGRAM(s) Oral every 12 hours  ascorbic acid 500 milliGRAM(s) Oral daily  aspirin  chewable 81 milliGRAM(s) Oral daily  atorvastatin 40 milliGRAM(s) Oral at bedtime  bisacodyl 10 milliGRAM(s) Oral daily  cholecalciferol 1000 Unit(s) Oral daily  dextrose 5%. 1000 milliLiter(s) (100 mL/Hr) IV Continuous <Continuous>  dextrose 5%. 1000 milliLiter(s) (50 mL/Hr) IV Continuous <Continuous>  dextrose 50% Injectable 25 Gram(s) IV Push once  dextrose 50% Injectable 12.5 Gram(s) IV Push once  dextrose 50% Injectable 25 Gram(s) IV Push once  digoxin     Tablet 250 MICROGram(s) Oral daily  diltiazem    Tablet 90 milliGRAM(s) Oral every 6 hours  ertapenem  IVPB 1000 milliGRAM(s) IV Intermittent every 24 hours  ferrous    sulfate 325 milliGRAM(s) Oral two times a day  furosemide   Injectable 40 milliGRAM(s) IV Push every 12 hours  gabapentin 300 milliGRAM(s) Oral every 12 hours  glucagon  Injectable 1 milliGRAM(s) IntraMuscular once  insulin glargine Injectable (LANTUS) 15 Unit(s) SubCutaneous every morning  insulin glargine Injectable (LANTUS) 7 Unit(s) SubCutaneous at bedtime  insulin lispro (ADMELOG) corrective regimen sliding scale   SubCutaneous three times a day before meals  insulin lispro (ADMELOG) corrective regimen sliding scale   SubCutaneous at bedtime  loratadine 10 milliGRAM(s) Oral daily  melatonin 5 milliGRAM(s) Oral at bedtime  metoclopramide 5 milliGRAM(s) Oral three times a day  metoprolol tartrate 25 milliGRAM(s) Oral two times a day  multivitamin/minerals 1 Tablet(s) Oral daily  naloxegol 25 milliGRAM(s) Oral daily  oxybutynin 5 milliGRAM(s) Oral two times a day  oxyCODONE    IR 10 milliGRAM(s) Oral two times a day  pantoprazole    Tablet 40 milliGRAM(s) Oral before breakfast  polyethylene glycol 3350 17 Gram(s) Oral two times a day  saccharomyces boulardii 250 milliGRAM(s) Oral two times a day  senna 2 Tablet(s) Oral at bedtime  sodium chloride 0.65% Nasal 1 Spray(s) Both Nostrils daily  sucralfate 1 Gram(s) Oral two times a day  tiotropium 2.5 MICROgram(s) Inhaler 2 Puff(s) Inhalation daily    MEDICATIONS  (PRN):  acetaminophen     Tablet .. 650 milliGRAM(s) Oral every 6 hours PRN Temp greater or equal to 38C (100.4F), Mild Pain (1 - 3)  albuterol    0.083% 2.5 milliGRAM(s) Nebulizer every 4 hours PRN Shortness of Breath and/or Wheezing  aluminum hydroxide/magnesium hydroxide/simethicone Suspension 30 milliLiter(s) Oral every 4 hours PRN Dyspepsia  dextrose Oral Gel 15 Gram(s) Oral once PRN Blood Glucose LESS THAN 70 milliGRAM(s)/deciliter  ondansetron Injectable 4 milliGRAM(s) IV Push every 6 hours PRN Nausea and/or Vomiting         Vitals log        ICU Vital Signs Last 24 Hrs  T(C): 36.8 (21 Mar 2024 04:28), Max: 36.8 (21 Mar 2024 04:28)  T(F): 98.3 (21 Mar 2024 04:28), Max: 98.3 (21 Mar 2024 04:28)  HR: 87 (21 Mar 2024 04:28) (62 - 88)  BP: 118/69 (21 Mar 2024 04:28) (118/69 - 128/61)  BP(mean): --  ABP: --  ABP(mean): --  RR: 18 (21 Mar 2024 04:28) (18 - 19)  SpO2: 91% (21 Mar 2024 04:28) (91% - 94%)    O2 Parameters below as of 21 Mar 2024 04:28  Patient On (Oxygen Delivery Method): nasal cannula  O2 Flow (L/min): 2               Input and Output:  I&O's Detail    19 Mar 2024 07:01  -  20 Mar 2024 07:00  --------------------------------------------------------  IN:    Oral Fluid: 240 mL  Total IN: 240 mL    OUT:    Indwelling Catheter - Urethral (mL): 1500 mL  Total OUT: 1500 mL    Total NET: -1260 mL      20 Mar 2024 07:01  -  21 Mar 2024 05:43  --------------------------------------------------------  IN:    Oral Fluid: 840 mL  Total IN: 840 mL    OUT:    Indwelling Catheter - Urethral (mL): 2775 mL  Total OUT: 2775 mL    Total NET: -1935 mL          Lab Data                        12.1   7.98  )-----------( 194      ( 20 Mar 2024 06:15 )             37.9     03-20    146<H>  |  107  |  17  ----------------------------<  188<H>  3.3<L>   |  32<H>  |  1.20    Ca    9.1      20 Mar 2024 06:15  Phos  3.1     03-19  Mg     1.7     03-20              Review of Systems	      Objective     Physical Examination    heart s1s2  lung dc BS  head nc      Pertinent Lab findings & Imaging      Ale:  NO   Adequate UO     I&O's Detail    19 Mar 2024 07:01  -  20 Mar 2024 07:00  --------------------------------------------------------  IN:    Oral Fluid: 240 mL  Total IN: 240 mL    OUT:    Indwelling Catheter - Urethral (mL): 1500 mL  Total OUT: 1500 mL    Total NET: -1260 mL      20 Mar 2024 07:01  -  21 Mar 2024 05:43  --------------------------------------------------------  IN:    Oral Fluid: 840 mL  Total IN: 840 mL    OUT:    Indwelling Catheter - Urethral (mL): 2775 mL  Total OUT: 2775 mL    Total NET: -1935 mL               Discussed with:     Cultures:	        Radiology

## 2024-03-21 NOTE — PROGRESS NOTE ADULT - SUBJECTIVE AND OBJECTIVE BOX
[facility] -- Brittany Lutz Pannella, Patel, Savella, Goodger, Cohen: Office # [phone]    Follow Up: Afib w/ RVR, CAD    Subjective/Observations: Pt seen and examined, awake, alert, resting in bed, denies chest pain, dyspnea, palpitations or dizziness, orthopnea and PND. on O2 via NC. Continues with LE edema. Remains in rate controlled A fib in tele.     REVIEW OF SYSTEMS: All other review of systems are negative unless indicated above    PAST MEDICAL & SURGICAL HISTORY:  Diabetes      Diabetes mellitus with no complication      Afib      Hypertension      Hypertension      BPH (benign prostatic hyperplasia)      Perforated gastric ulcer  s/p emergent ex-lap omentopexy and plication 6/2019      Pulmonary embolism      History of non-ST elevation myocardial infarction (NSTEMI)      Osteomyelitis  s/p debridement      CAD S/P percutaneous coronary angioplasty      Cerebrovascular accident      H/O abdominal surgery      Perforated gastric ulcer      Traumatic amputation of left foot, initial encounter      MEDICATIONS  (STANDING):  apixaban 5 milliGRAM(s) Oral every 12 hours  ascorbic acid 500 milliGRAM(s) Oral daily  aspirin  chewable 81 milliGRAM(s) Oral daily  atorvastatin 40 milliGRAM(s) Oral at bedtime  bisacodyl 10 milliGRAM(s) Oral daily  cholecalciferol 1000 Unit(s) Oral daily  dextrose 5%. 1000 milliLiter(s) (100 mL/Hr) IV Continuous <Continuous>  dextrose 5%. 1000 milliLiter(s) (50 mL/Hr) IV Continuous <Continuous>  dextrose 50% Injectable 25 Gram(s) IV Push once  dextrose 50% Injectable 12.5 Gram(s) IV Push once  dextrose 50% Injectable 25 Gram(s) IV Push once  digoxin     Tablet 250 MICROGram(s) Oral daily  diltiazem    Tablet 90 milliGRAM(s) Oral every 6 hours  ertapenem  IVPB 1000 milliGRAM(s) IV Intermittent every 24 hours  ferrous    sulfate 325 milliGRAM(s) Oral two times a day  furosemide   Injectable 40 milliGRAM(s) IV Push every 12 hours  gabapentin 300 milliGRAM(s) Oral every 12 hours  glucagon  Injectable 1 milliGRAM(s) IntraMuscular once  insulin glargine Injectable (LANTUS) 15 Unit(s) SubCutaneous every morning  insulin glargine Injectable (LANTUS) 7 Unit(s) SubCutaneous at bedtime  insulin lispro (ADMELOG) corrective regimen sliding scale   SubCutaneous three times a day before meals  insulin lispro (ADMELOG) corrective regimen sliding scale   SubCutaneous at bedtime  loratadine 10 milliGRAM(s) Oral daily  melatonin 5 milliGRAM(s) Oral at bedtime  metoclopramide 5 milliGRAM(s) Oral three times a day  metoprolol tartrate 25 milliGRAM(s) Oral two times a day  multivitamin/minerals 1 Tablet(s) Oral daily  naloxegol 25 milliGRAM(s) Oral daily  oxybutynin 5 milliGRAM(s) Oral two times a day  oxyCODONE    IR 10 milliGRAM(s) Oral two times a day  pantoprazole    Tablet 40 milliGRAM(s) Oral before breakfast  polyethylene glycol 3350 17 Gram(s) Oral two times a day  saccharomyces boulardii 250 milliGRAM(s) Oral two times a day  senna 2 Tablet(s) Oral at bedtime  sodium chloride 0.65% Nasal 1 Spray(s) Both Nostrils daily  sucralfate 1 Gram(s) Oral two times a day  tiotropium 2.5 MICROgram(s) Inhaler 2 Puff(s) Inhalation daily    MEDICATIONS  (PRN):  acetaminophen     Tablet .. 650 milliGRAM(s) Oral every 6 hours PRN Temp greater or equal to 38C (100.4F), Mild Pain (1 - 3)  albuterol    0.083% 2.5 milliGRAM(s) Nebulizer every 4 hours PRN Shortness of Breath and/or Wheezing  aluminum hydroxide/magnesium hydroxide/simethicone Suspension 30 milliLiter(s) Oral every 4 hours PRN Dyspepsia  dextrose Oral Gel 15 Gram(s) Oral once PRN Blood Glucose LESS THAN 70 milliGRAM(s)/deciliter  ondansetron Injectable 4 milliGRAM(s) IV Push every 6 hours PRN Nausea and/or Vomiting    Allergies    No Known Drug Allergies  fish (Hives)    Vital Signs Last 24 Hrs  T(C): 36.8 (21 Mar 2024 04:28), Max: 36.8 (21 Mar 2024 04:28)  T(F): 98.3 (21 Mar 2024 04:28), Max: 98.3 (21 Mar 2024 04:28)  HR: 87 (21 Mar 2024 04:28) (62 - 88)  BP: 118/69 (21 Mar 2024 04:28) (118/69 - 128/61)  BP(mean): --  RR: 18 (21 Mar 2024 04:28) (18 - 19)  SpO2: 91% (21 Mar 2024 04:28) (91% - 94%)    Parameters below as of 21 Mar 2024 04:28  Patient On (Oxygen Delivery Method): nasal cannula  O2 Flow (L/min): 2    I&O's Summary    20 Mar 2024 07:01  -  21 Mar 2024 07:00  --------------------------------------------------------  IN: 840 mL / OUT: 2775 mL / NET: -1935 mL          TELE: A fib 60-80s, nonsustained 120s early am x 1   PHYSICAL EXAM:  Constitutional: NAD, awake and alert  HEENT: Moist Mucous Membranes, Anicteric  Pulmonary: Non-labored, breath sounds are clear bilaterally, No wheezing, rales or rhonchi  Cardiovascular: Regular, S1 and S2, No murmurs, No rubs, gallops or clicks  Gastrointestinal:  soft, nontender, nondistended   Lymph: +2 peripheral edema Lt> rt. No lymphadenopathy.   Skin: No visible rashes or ulcers.  Psych:  Mood & affect appropriate      LABS: All Labs Reviewed:                        12.1   7.98  )-----------( 194      ( 20 Mar 2024 06:15 )             37.9                         11.4   8.96  )-----------( 186      ( 19 Mar 2024 08:08 )             36.3     20 Mar 2024 06:15    146    |  107    |  17     ----------------------------<  188    3.3     |  32     |  1.20   19 Mar 2024 08:08    145    |  109    |  17     ----------------------------<  176    3.5     |  29     |  1.20     Ca    9.1        20 Mar 2024 06:15  Ca    8.7        19 Mar 2024 08:08  Phos  3.1       19 Mar 2024 08:08  Mg     1.7       20 Mar 2024 06:15  Mg     1.9       19 Mar 2024 08:08       Troponin I, High Sensitivity Result: 51.5 ng/L (03-16-24 @ 23:00)  Triiodothyronine, Total (T3 Total): 85 ng/dL (03-17-24 @ 06:40)    12 Lead ECG:   Ventricular Rate 154 BPM    QRS Duration 76 ms    Q-T Interval 282 ms    QTC Calculation(Bazett) 451 ms    R Axis 15 degrees    T Axis 194 degrees    Diagnosis Line *** Critical Test Result: High HR  Atrial fibrillation with rapid ventricular response  Low voltage QRS  Nonspecific ST and T wave abnormality    Confirmed by JOHN KU ([id number]) on 3/17/2024 7:30:13 AM (03-16-24 @ 23:50)      TRANSTHORACIC ECHOCARDIOGRAM REPORT  ________________________________________________________________________________                                      _______       Pt. Name:       SARAH MORRISON Study Date:    3/18/2024  MRN:            [id number]         YOB: 1970  Accession #:    [id number]        Age:           53 years  Account#:       [id number]       Gender:        M  Heart Rate:                      Height:        74.02 in (188.00 cm)  Rhythm:                          Weight:        244.71 lb (111.00 kg)  Blood Pressure: 100/60 mmHg      BSA/BMI:       2.37 m² / 31.41 kg/m²  ________________________________________________________________________________________  Referring Physician:    [id number] Hill Brizuela  Interpreting Physician: Mary Maya MD  Primary Sonographer:    Mounika FELDMAN    CPT:               ECHO TTE WO CON COMP W DOPP - 86460.m  Indication(s):     Dyspnea, unspecified - R06.00  Procedure:         Transthoracic echocardiogram with 2-D, M-mode and complete                     spectral and color flow Doppler.  Ordering Location: [facility]  Admission Status:  Inpatient  Study Information: Image quality for this study is technically difficult.    _______________________________________________________________________________________     CONCLUSIONS:      1. Technically difficult image quality.   2. Left ventricular cavity is normal in size. Left ventricular wall thickness is normal. Left ventricular systolic function is normal with an ejection fraction visually estimated at 50 to 55 %.   3. Normal right ventricular cavity size, with normal wall thickness, and mildly reduced systolic function.   4. The left atrium is moderately dilated.   5. The right atrium is moderately dilated.   6. Moderate to severe mitral regurgitation.   7. Mild to moderate tricuspid regurgitation.   8. Estimated pulmonary artery systolic pressure is 36 mmHg, consistent with mild pulmonary hypertension.   9. The inferior vena cava is dilated measuring 2.40 cm in diameter, (dilated >2.1cm) with normal inspiratory collapse (normal >50%) consistent with mildly elevated right atrial pressure (~8, range 5-10mmHg).  10. Trace pericardial effusion.    ________________________________________________________________________________________  FINDINGS:     Left Ventricle:  The left ventricular cavity is normal in size. Left ventricular wall thickness is normal. Left ventricular systolic function is normal with an ejection fraction visually estimated at 50 to 55%.The left ventricular diastolic function is indeterminate.     Right Ventricle:  The right ventricular cavity is normal in size, with normal wall thickness and mildly reduced systolic function.     Left Atrium:  The left atrium is moderately dilated.     Right Atrium:  The right atrium is moderately dilated.     Aortic Valve:  The aortic valve anatomy cannot be determined with normal systolic excursion. There is no aortic valve stenosis.     Mitral Valve:  There is mild calcification of the mitralvalve annulus. There is moderate to severe mitral regurgitation.     Tricuspid Valve:  Structurally normal tricuspid valve with normal leaflet excursion. There is mild to moderate tricuspid regurgitation. Estimated pulmonary artery systolic pressure is 36 mmHg, consistent with mild pulmonary hypertension.     Pulmonic Valve:  The pulmonic valve was not well visualized. There is trace pulmonic regurgitation.     Pericardium:  There is a trace pericardial effusion.     Systemic Veins:  The inferiorvena cava is dilated measuring 2.40 cm in diameter, (dilated >2.1cm) with normal inspiratory collapse (normal >50%) consistent with mildly elevated right atrial pressure (~8, range 5-10mmHg).  ____________________________________________________________________  QUANTITATIVE DATA:  Left Ventricle Measurements: (Indexed to BSA)     IVSd (2D):   1.9 cm  LVPWd (2D):  1.5 cm  LVIDd (2D):  5.3 cm  LVIDs (2D):  3.9 cm  LV Mass:     413 g  174.4 g/m²  Visualized LV EF%: 50 to 55%     MV E Vmax:    1.15 m/s  e' lateral:   10.90 cm/s  e' medial:    10.50 cm/s  E/e' lateral: 10.56  E/e' medial:  12.00  E/e' Average: 10.76  MV DT:        137 msec    Aorta Measurements: (Normal range) (Indexed to BSA)     Sinuses of Valsalva: 3.50 cm (3.1 - 3.7 cm)       Left Atrium Measurements: (Indexed to BSA)  LA Diam 2D: 3.99 cm       LVOT / RVOT/ Qp/Qs Data: (Indexed to BSA)  LVOT Diameter: 2.22 cm    Mitral Valve Measurements:     MV E Vmax: 1.2 m/s       Tricuspid Valve Measurements:     TR Vmax:          2.6m/s  TR Peak Gradient: 27.7 mmHg  RA Pressure:      8 mmHg  PASP:             36 mmHg    ________________________________________________________________________________________  Electronically signed on 3/19/2024 at 11:27:59 AM by Mary Maya MD  *** Final ***   [facility] -- Brittany Lutz Pannella, Patel, Savella, Goodger, Cohen: Office # [phone]    Follow Up: Afib w/ RVR, CAD    Subjective/Observations: Pt seen and examined, awake, alert, resting in bed, denies chest pain, dyspnea, palpitations or dizziness, orthopnea and PND.  Continues with LE edema although improving. Tolerating room air. Remains in rate controlled A fib in tele.     REVIEW OF SYSTEMS: All other review of systems are negative unless indicated above    PAST MEDICAL & SURGICAL HISTORY:  Diabetes      Diabetes mellitus with no complication      Afib      Hypertension      Hypertension      BPH (benign prostatic hyperplasia)      Perforated gastric ulcer  s/p emergent ex-lap omentopexy and plication 6/2019      Pulmonary embolism      History of non-ST elevation myocardial infarction (NSTEMI)      Osteomyelitis  s/p debridement      CAD S/P percutaneous coronary angioplasty      Cerebrovascular accident      H/O abdominal surgery      Perforated gastric ulcer      Traumatic amputation of left foot, initial encounter      MEDICATIONS  (STANDING):  apixaban 5 milliGRAM(s) Oral every 12 hours  ascorbic acid 500 milliGRAM(s) Oral daily  aspirin  chewable 81 milliGRAM(s) Oral daily  atorvastatin 40 milliGRAM(s) Oral at bedtime  bisacodyl 10 milliGRAM(s) Oral daily  cholecalciferol 1000 Unit(s) Oral daily  dextrose 5%. 1000 milliLiter(s) (100 mL/Hr) IV Continuous <Continuous>  dextrose 5%. 1000 milliLiter(s) (50 mL/Hr) IV Continuous <Continuous>  dextrose 50% Injectable 25 Gram(s) IV Push once  dextrose 50% Injectable 12.5 Gram(s) IV Push once  dextrose 50% Injectable 25 Gram(s) IV Push once  digoxin     Tablet 250 MICROGram(s) Oral daily  diltiazem    Tablet 90 milliGRAM(s) Oral every 6 hours  ertapenem  IVPB 1000 milliGRAM(s) IV Intermittent every 24 hours  ferrous    sulfate 325 milliGRAM(s) Oral two times a day  furosemide   Injectable 40 milliGRAM(s) IV Push every 12 hours  gabapentin 300 milliGRAM(s) Oral every 12 hours  glucagon  Injectable 1 milliGRAM(s) IntraMuscular once  insulin glargine Injectable (LANTUS) 15 Unit(s) SubCutaneous every morning  insulin glargine Injectable (LANTUS) 7 Unit(s) SubCutaneous at bedtime  insulin lispro (ADMELOG) corrective regimen sliding scale   SubCutaneous three times a day before meals  insulin lispro (ADMELOG) corrective regimen sliding scale   SubCutaneous at bedtime  loratadine 10 milliGRAM(s) Oral daily  melatonin 5 milliGRAM(s) Oral at bedtime  metoclopramide 5 milliGRAM(s) Oral three times a day  metoprolol tartrate 25 milliGRAM(s) Oral two times a day  multivitamin/minerals 1 Tablet(s) Oral daily  naloxegol 25 milliGRAM(s) Oral daily  oxybutynin 5 milliGRAM(s) Oral two times a day  oxyCODONE    IR 10 milliGRAM(s) Oral two times a day  pantoprazole    Tablet 40 milliGRAM(s) Oral before breakfast  polyethylene glycol 3350 17 Gram(s) Oral two times a day  saccharomyces boulardii 250 milliGRAM(s) Oral two times a day  senna 2 Tablet(s) Oral at bedtime  sodium chloride 0.65% Nasal 1 Spray(s) Both Nostrils daily  sucralfate 1 Gram(s) Oral two times a day  tiotropium 2.5 MICROgram(s) Inhaler 2 Puff(s) Inhalation daily    MEDICATIONS  (PRN):  acetaminophen     Tablet .. 650 milliGRAM(s) Oral every 6 hours PRN Temp greater or equal to 38C (100.4F), Mild Pain (1 - 3)  albuterol    0.083% 2.5 milliGRAM(s) Nebulizer every 4 hours PRN Shortness of Breath and/or Wheezing  aluminum hydroxide/magnesium hydroxide/simethicone Suspension 30 milliLiter(s) Oral every 4 hours PRN Dyspepsia  dextrose Oral Gel 15 Gram(s) Oral once PRN Blood Glucose LESS THAN 70 milliGRAM(s)/deciliter  ondansetron Injectable 4 milliGRAM(s) IV Push every 6 hours PRN Nausea and/or Vomiting    Allergies    No Known Drug Allergies  fish (Hives)    Vital Signs Last 24 Hrs  T(C): 36.8 (21 Mar 2024 04:28), Max: 36.8 (21 Mar 2024 04:28)  T(F): 98.3 (21 Mar 2024 04:28), Max: 98.3 (21 Mar 2024 04:28)  HR: 87 (21 Mar 2024 04:28) (62 - 88)  BP: 118/69 (21 Mar 2024 04:28) (118/69 - 128/61)  BP(mean): --  RR: 18 (21 Mar 2024 04:28) (18 - 19)  SpO2: 91% (21 Mar 2024 04:28) (91% - 94%)    Parameters below as of 21 Mar 2024 04:28  Patient On (Oxygen Delivery Method): nasal cannula  O2 Flow (L/min): 2    I&O's Summary    20 Mar 2024 07:01  -  21 Mar 2024 07:00  --------------------------------------------------------  IN: 840 mL / OUT: 2775 mL / NET: -1935 mL          TELE: A fib 60-80s, nonsustained 120s early am x 1   PHYSICAL EXAM:  Constitutional: NAD, awake and alert  HEENT: Moist Mucous Membranes, Anicteric  Pulmonary: Non-labored, breath sounds are clear bilaterally, No wheezing, rales or rhonchi  Cardiovascular: Regular, S1 and S2, No murmurs, No rubs, gallops or clicks  Gastrointestinal:  soft, nontender, nondistended   Lymph: +1 peripheral edema Lt> rt. No lymphadenopathy.   Skin: No visible rashes or ulcers.  Psych:  Mood & affect appropriate      LABS: All Labs Reviewed:                        12.1   7.98  )-----------( 194      ( 20 Mar 2024 06:15 )             37.9                         11.4   8.96  )-----------( 186      ( 19 Mar 2024 08:08 )             36.3     20 Mar 2024 06:15    146    |  107    |  17     ----------------------------<  188    3.3     |  32     |  1.20   19 Mar 2024 08:08    145    |  109    |  17     ----------------------------<  176    3.5     |  29     |  1.20     Ca    9.1        20 Mar 2024 06:15  Ca    8.7        19 Mar 2024 08:08  Phos  3.1       19 Mar 2024 08:08  Mg     1.7       20 Mar 2024 06:15  Mg     1.9       19 Mar 2024 08:08       Troponin I, High Sensitivity Result: 51.5 ng/L (03-16-24 @ 23:00)  Triiodothyronine, Total (T3 Total): 85 ng/dL (03-17-24 @ 06:40)    12 Lead ECG:   Ventricular Rate 154 BPM    QRS Duration 76 ms    Q-T Interval 282 ms    QTC Calculation(Bazett) 451 ms    R Axis 15 degrees    T Axis 194 degrees    Diagnosis Line *** Critical Test Result: High HR  Atrial fibrillation with rapid ventricular response  Low voltage QRS  Nonspecific ST and T wave abnormality    Confirmed by JOHN KU ([id number]) on 3/17/2024 7:30:13 AM (03-16-24 @ 23:50)      TRANSTHORACIC ECHOCARDIOGRAM REPORT  ________________________________________________________________________________                                      _______       Pt. Name:       SARAH MORRISON Study Date:    3/18/2024  MRN:            [id number]         YOB: 1970  Accession #:    [id number]        Age:           53 years  Account#:       [id number]       Gender:        M  Heart Rate:                      Height:        74.02 in (188.00 cm)  Rhythm:                          Weight:        244.71 lb (111.00 kg)  Blood Pressure: 100/60 mmHg      BSA/BMI:       2.37 m² / 31.41 kg/m²  ________________________________________________________________________________________  Referring Physician:    [id number] Hill Brizuela  Interpreting Physician: Mary Maya MD  Primary Sonographer:    Mounika FELDMAN    CPT:               ECHO TTE WO CON COMP W DOPP - 64070.m  Indication(s):     Dyspnea, unspecified - R06.00  Procedure:         Transthoracic echocardiogram with 2-D, M-mode and complete                     spectral and color flow Doppler.  Ordering Location: [facility]  Admission Status:  Inpatient  Study Information: Image quality for this study is technically difficult.    _______________________________________________________________________________________     CONCLUSIONS:      1. Technically difficult image quality.   2. Left ventricular cavity is normal in size. Left ventricular wall thickness is normal. Left ventricular systolic function is normal with an ejection fraction visually estimated at 50 to 55 %.   3. Normal right ventricular cavity size, with normal wall thickness, and mildly reduced systolic function.   4. The left atrium is moderately dilated.   5. The right atrium is moderately dilated.   6. Moderate to severe mitral regurgitation.   7. Mild to moderate tricuspid regurgitation.   8. Estimated pulmonary artery systolic pressure is 36 mmHg, consistent with mild pulmonary hypertension.   9. The inferior vena cava is dilated measuring 2.40 cm in diameter, (dilated >2.1cm) with normal inspiratory collapse (normal >50%) consistent with mildly elevated right atrial pressure (~8, range 5-10mmHg).  10. Trace pericardial effusion.    ________________________________________________________________________________________  FINDINGS:     Left Ventricle:  The left ventricular cavity is normal in size. Left ventricular wall thickness is normal. Left ventricular systolic function is normal with an ejection fraction visually estimated at 50 to 55%.The left ventricular diastolic function is indeterminate.     Right Ventricle:  The right ventricular cavity is normal in size, with normal wall thickness and mildly reduced systolic function.     Left Atrium:  The left atrium is moderately dilated.     Right Atrium:  The right atrium is moderately dilated.     Aortic Valve:  The aortic valve anatomy cannot be determined with normal systolic excursion. There is no aortic valve stenosis.     Mitral Valve:  There is mild calcification of the mitralvalve annulus. There is moderate to severe mitral regurgitation.     Tricuspid Valve:  Structurally normal tricuspid valve with normal leaflet excursion. There is mild to moderate tricuspid regurgitation. Estimated pulmonary artery systolic pressure is 36 mmHg, consistent with mild pulmonary hypertension.     Pulmonic Valve:  The pulmonic valve was not well visualized. There is trace pulmonic regurgitation.     Pericardium:  There is a trace pericardial effusion.     Systemic Veins:  The inferiorvena cava is dilated measuring 2.40 cm in diameter, (dilated >2.1cm) with normal inspiratory collapse (normal >50%) consistent with mildly elevated right atrial pressure (~8, range 5-10mmHg).  ____________________________________________________________________  QUANTITATIVE DATA:  Left Ventricle Measurements: (Indexed to BSA)     IVSd (2D):   1.9 cm  LVPWd (2D):  1.5 cm  LVIDd (2D):  5.3 cm  LVIDs (2D):  3.9 cm  LV Mass:     413 g  174.4 g/m²  Visualized LV EF%: 50 to 55%     MV E Vmax:    1.15 m/s  e' lateral:   10.90 cm/s  e' medial:    10.50 cm/s  E/e' lateral: 10.56  E/e' medial:  12.00  E/e' Average: 10.76  MV DT:        137 msec    Aorta Measurements: (Normal range) (Indexed to BSA)     Sinuses of Valsalva: 3.50 cm (3.1 - 3.7 cm)       Left Atrium Measurements: (Indexed to BSA)  LA Diam 2D: 3.99 cm       LVOT / RVOT/ Qp/Qs Data: (Indexed to BSA)  LVOT Diameter: 2.22 cm    Mitral Valve Measurements:     MV E Vmax: 1.2 m/s       Tricuspid Valve Measurements:     TR Vmax:          2.6m/s  TR Peak Gradient: 27.7 mmHg  RA Pressure:      8 mmHg  PASP:             36 mmHg    ________________________________________________________________________________________  Electronically signed on 3/19/2024 at 11:27:59 AM by Mary Maya MD  *** Final ***   set-up required/verbal cues/nonverbal cues (demo/gestures)/1 person assist

## 2024-03-21 NOTE — PROGRESS NOTE ADULT - ASSESSMENT
54 y/o M PMhx AF on Eliquis, chronic jacques, CAD s/p stents, CVA, DM, HTN, PE who presented w/ SOB, and dislodged jacques as well as dysuria.  also found to have Afib w/ RVR  admitted with  concerns for sepsis, CAUTI, dislodged jacques  reported dysuria  jacques placed in ED  CT abd/pelvis- No hydronephrosis or nephrolithiasis.    Recommendations  1-Bacteremia ESBL E coli (suspected pyelonephritis)   -  Escherichia coli: Detec   -  ESBL: Detec   -  CTX-M Resistance Marker: Detec  Culture - Urine (03.16.24 @ 22:35)  >100,000 CFU/ml Escherichia coli ESBL  Repeat blood cultures collected 3/18 5am - NGTD past 48 hours  ertapenem started 3/17 continue for now with last day 3/26  ordered midline and ertapenem x 10 days can be completed in outpt setting  no labs required and midline can be removed at end of Rx    2-Foot wound open area on pressure location, now dressed, podiatry involved for this unstageable ulcer with rec  Xra  MRI  wound care  -recs to follow based on findings    Thank you for consulting us and involving us in the management of this most interesting and challenging case.  We will follow along in the care of this patient. Please call us at [phone] or text me directly on my cell# at [phone] with any concerns.

## 2024-03-21 NOTE — PROGRESS NOTE ADULT - SUBJECTIVE AND OBJECTIVE BOX
Date of Service: 03-21-24 @ 12:10    Patient is a 53y old  Male who presents with a chief complaint of difficultly breathing (20 Mar 2024 15:20)      INTERVAL HPI/OVERNIGHT EVENTS: Patient seen and examined. NAD. No complaints.    Vital Signs Last 24 Hrs  T(C): 36.6 (21 Mar 2024 11:16), Max: 36.8 (21 Mar 2024 04:28)  T(F): 97.9 (21 Mar 2024 11:16), Max: 98.3 (21 Mar 2024 04:28)  HR: 83 (21 Mar 2024 11:16) (62 - 88)  BP: 114/73 (21 Mar 2024 11:16) (114/73 - 128/61)  BP(mean): --  RR: 18 (21 Mar 2024 11:16) (18 - 19)  SpO2: 91% (21 Mar 2024 11:16) (91% - 92%)    Parameters below as of 21 Mar 2024 11:16  Patient On (Oxygen Delivery Method): room air        03-21    146<H>  |  107  |  17  ----------------------------<  217<H>  4.1   |  33<H>  |  1.10    Ca    9.3      21 Mar 2024 08:27  Mg     1.6     03-21                            12.1   7.98  )-----------( 194      ( 20 Mar 2024 06:15 )             37.9       CAPILLARY BLOOD GLUCOSE      POCT Blood Glucose.: 202 mg/dL (21 Mar 2024 07:56)  POCT Blood Glucose.: 211 mg/dL (20 Mar 2024 21:32)  POCT Blood Glucose.: 178 mg/dL (20 Mar 2024 17:59)  POCT Blood Glucose.: 230 mg/dL (20 Mar 2024 12:12)    Urinalysis Basic - ( 21 Mar 2024 08:27 )    Color: x / Appearance: x / SG: x / pH: x  Gluc: 217 mg/dL / Ketone: x  / Bili: x / Urobili: x   Blood: x / Protein: x / Nitrite: x   Leuk Esterase: x / RBC: x / WBC x   Sq Epi: x / Non Sq Epi: x / Bacteria: x              acetaminophen     Tablet .. 650 milliGRAM(s) Oral every 6 hours PRN  albuterol    0.083% 2.5 milliGRAM(s) Nebulizer every 4 hours PRN  aluminum hydroxide/magnesium hydroxide/simethicone Suspension 30 milliLiter(s) Oral every 4 hours PRN  apixaban 5 milliGRAM(s) Oral every 12 hours  ascorbic acid 500 milliGRAM(s) Oral daily  aspirin  chewable 81 milliGRAM(s) Oral daily  atorvastatin 40 milliGRAM(s) Oral at bedtime  bisacodyl 10 milliGRAM(s) Oral daily  cholecalciferol 1000 Unit(s) Oral daily  dextrose 5%. 1000 milliLiter(s) IV Continuous <Continuous>  dextrose 5%. 1000 milliLiter(s) IV Continuous <Continuous>  dextrose 50% Injectable 25 Gram(s) IV Push once  dextrose 50% Injectable 12.5 Gram(s) IV Push once  dextrose 50% Injectable 25 Gram(s) IV Push once  dextrose Oral Gel 15 Gram(s) Oral once PRN  digoxin     Tablet 250 MICROGram(s) Oral daily  diltiazem    Tablet 90 milliGRAM(s) Oral every 6 hours  ertapenem  IVPB 1000 milliGRAM(s) IV Intermittent every 24 hours  ferrous    sulfate 325 milliGRAM(s) Oral two times a day  furosemide   Injectable 40 milliGRAM(s) IV Push every 12 hours  gabapentin 300 milliGRAM(s) Oral every 12 hours  glucagon  Injectable 1 milliGRAM(s) IntraMuscular once  insulin glargine Injectable (LANTUS) 15 Unit(s) SubCutaneous every morning  insulin glargine Injectable (LANTUS) 7 Unit(s) SubCutaneous at bedtime  insulin lispro (ADMELOG) corrective regimen sliding scale   SubCutaneous three times a day before meals  insulin lispro (ADMELOG) corrective regimen sliding scale   SubCutaneous at bedtime  loratadine 10 milliGRAM(s) Oral daily  melatonin 5 milliGRAM(s) Oral at bedtime  metoclopramide 5 milliGRAM(s) Oral three times a day  metoprolol tartrate 25 milliGRAM(s) Oral two times a day  multivitamin/minerals 1 Tablet(s) Oral daily  naloxegol 25 milliGRAM(s) Oral daily  ondansetron Injectable 4 milliGRAM(s) IV Push every 6 hours PRN  oxybutynin 5 milliGRAM(s) Oral two times a day  oxyCODONE    IR 10 milliGRAM(s) Oral two times a day  pantoprazole    Tablet 40 milliGRAM(s) Oral before breakfast  polyethylene glycol 3350 17 Gram(s) Oral two times a day  saccharomyces boulardii 250 milliGRAM(s) Oral two times a day  senna 2 Tablet(s) Oral at bedtime  sodium chloride 0.65% Nasal 1 Spray(s) Both Nostrils daily  sucralfate 1 Gram(s) Oral two times a day  tiotropium 2.5 MICROgram(s) Inhaler 2 Puff(s) Inhalation daily              REVIEW OF SYSTEMS:  CONSTITUTIONAL: No fever, no weight loss, or no fatigue  NECK: No pain, no stiffness  RESPIRATORY: No cough, no wheezing, no chills, no hemoptysis, No shortness of breath  CARDIOVASCULAR: No chest pain, no palpitations, no dizziness, no leg swelling  GASTROINTESTINAL: No abdominal pain. No nausea, no vomiting, no hematemesis; No diarrhea, no constipation. No melena, no hematochezia.  GENITOURINARY: No dysuria, no frequency, no hematuria, no incontinence  NEUROLOGICAL: No headaches, no loss of strength, no numbness, no tremors  SKIN: No itching, no burning  MUSCULOSKELETAL: No joint pain, no swelling; No muscle, no back, no extremity pain  PSYCHIATRIC: No depression, no mood swings,   HEME/LYMPH: No easy bruising, no bleeding gums  ALLERY AND IMMUNOLOGIC: No hives       Consultant(s) Notes Reviewed:  [X] YES  [ ] NO    PHYSICAL EXAM:  GENERAL: NAD  HEAD:  Atraumatic, Normocephalic  EYES: EOMI, PERRLA, conjunctiva and sclera clear  ENMT: No tonsillar erythema, exudates, or enlargement; Moist mucous membranes  NECK: Supple, No JVD  NERVOUS SYSTEM:  Awake & alert  CHEST/LUNG: Clear to auscultation bilaterally; No rales, rhonchi, wheezing,  HEART: Regular rate and rhythm  ABDOMEN: Soft, Nontender, Nondistended; Bowel sounds present  EXTREMITIES:  No clubbing, cyanosis, or edema  LYMPH: No lymphadenopathy noted  SKIN: No rashes      Advanced care planning discussed with patient/family [X] YES   [ ] NO    Advanced care planning discussed with patient/family. Patient's health status was discussed. All appropriate changes have been made regarding patient's end-of-life care. Advanced care planning forms reviewed/discussed/completed.  20 minutes spent.

## 2024-03-21 NOTE — PROGRESS NOTE ADULT - ASSESSMENT
53M with PMH of AF on Eliquis, indwelling jacques, CAD s/p stents, CVA, DM, HTN, osteomyelitis s/p debridement, PE, sent from [facility] for elevated BP, SOB, and dislodged jacques    AF  Emphysema  Atelectasis  CAD  CVA  DM  HTN  OP  OA  hx of OM  hx of PE    on abx  fio2 titration  diuresis in progress  vs noted  Vascular and ENDO eval noted - DM foot ulcer    ct neg for pe - atelectasis - emphysema  spiriva - nebs prn - VBG  cardio eval in progress  cvs rx regimen  proBNP noted to be elevated  diuresis - as per cardio recs  TTE reviewed  AF management  rate and rhythm control  GERARDO eval as outpatient  monitored unit admission  pulse ox monitoring  goal sat > 88 pct  DM care  serial FS  OLD records reviewed

## 2024-03-21 NOTE — PROGRESS NOTE ADULT - ASSESSMENT
53M with PMH of AF on Eliquis, indwelling Aguilera CAD s/p stents, CVA, DM, HTN, osteomyelitis s/p debridement, PE, sent from [facility] for elevated BP, SOB, and dislodged Aguilera, found to be febrile in ER, a/w sepsis and A fib RVR.     Sepsis, Afib RVR, CAD, HTN  - Known A fib, now -150s on admission   - Telemetry reviewed overnight Afib 60-80s mostly, 3 sec pause x 1 3/20  - S/p Cardizem gtt @15 mg/hr, now on Cardizem 60 mg Q6H  - S/p digoxin loaded ( digoxin 500 mcg IV + 250 mg PO) d/t hypotension and persistent AF with RVR in 140s-150s.   - Continue digoxin 250 mg PO daily   - Digoxin level: 1.5. monitor levels intervally  - Continue Metoprolol 25 mg PO BID with hold parameters, can uptitrate if HR remains uncontrolled    - Continue Eliquis for AC, admits compliance    - SOB improving, continuing on O2 supplementation (wean as tolerated) and LE edema   - TTE (12/2023) normal LV & RV size and function EF 55-60%, indeterminate DD, mod dilated LA and RA, mod MR, mild TR, PASP 31  - Technically difficult ECHO w/ normal LV size and function EF 50-55%, mildly reduced RV systolic function, mod dilated LA and RA, mod to sev MR, mild to mod TR, PASP 36  - BNP 7622  - Mild vol ol on examination with LE edema   - Creatinine:  <--1.20,  <--1.20,  <--1.40  - Continue Lasix 40 mg IV BID    - EKG: A fib RVR @ 154  - Troponin: <-51.5, no trend there after   - No evidence of any active ischemia   - Continue aspirin and statin     - BP stable and controlled   - Continue to hold home Imdur and losartan to allow room for AV nodals     - Sepsis likely 2/2 UTI  - Management per primary team     - Monitor and replete lytes, keep K>4, Mg>2.  - Will continue to follow.    Danny Arce, MS FNP, AGACNP  Nurse Practitioner- Cardiology   Please call on TEAMS   53M with PMH of AF on Eliquis, indwelling Aguilera CAD s/p stents, CVA, DM, HTN, osteomyelitis s/p debridement, PE, sent from [facility] for elevated BP, SOB, and dislodged Aguilera, found to be febrile in ER, a/w sepsis and A fib RVR.     Sepsis, Afib RVR, CAD, HTN  - Known A fib, now -150s on admission   - Telemetry reviewed overnight Afib 60-80s mostly, 3 sec pause x 1 3/20  - S/p Cardizem gtt @15 mg/hr, now on Cardizem 60 mg Q6H  - S/p digoxin loaded ( digoxin 500 mcg IV + 250 mg PO) d/t hypotension and persistent AF with RVR in 140s-150s.   - Continue digoxin 250 mg PO daily   - Digoxin level: 1.5. monitor levels intervally  - Continue Metoprolol 25 mg PO BID with hold parameters, can up titrate if needed  - Continue Eliquis for AC, admits compliance    - SOB improving, now of O2, LE edema improving  - TTE (12/2023) normal LV & RV size and function EF 55-60%, indeterminate DD, mod dilated LA and RA, mod MR, mild TR, PASP 31  - Technically difficult ECHO w/ normal LV size and function EF 50-55%, mildly reduced RV systolic function, mod dilated LA and RA, mod to sev MR, mild to mod TR, PASP 36  - BNP 7622  - Creatinine:  <--1.10,  <--1.20,  <--1.20,  <--1.40  - On Lasix 40 mg IV BID. Can change to PO in am     - EKG: A fib RVR @ 154  - Troponin: <-51.5, no trend there after   - No evidence of any active ischemia   - Continue aspirin and statin     - BP stable and controlled   - Continue to hold home Imdur and losartan to allow room for AV nodals     - Sepsis likely 2/2 UTI  - Management per primary team     - Monitor and replete lytes, keep K>4, Mg>2.  - Will continue to follow.    Danny Arce, MS FNP, AGACNP  Nurse Practitioner- Cardiology   Please call on TEAMS

## 2024-03-22 LAB
ANION GAP SERPL CALC-SCNC: 4 MMOL/L — LOW (ref 5–17)
BUN SERPL-MCNC: 17 MG/DL — SIGNIFICANT CHANGE UP (ref 7–23)
CALCIUM SERPL-MCNC: 8.8 MG/DL — SIGNIFICANT CHANGE UP (ref 8.5–10.1)
CHLORIDE SERPL-SCNC: 107 MMOL/L — SIGNIFICANT CHANGE UP (ref 96–108)
CO2 SERPL-SCNC: 33 MMOL/L — HIGH (ref 22–31)
CREAT SERPL-MCNC: 0.99 MG/DL — SIGNIFICANT CHANGE UP (ref 0.5–1.3)
EGFR: 90 ML/MIN/1.73M2 — SIGNIFICANT CHANGE UP
GLUCOSE BLDC GLUCOMTR-MCNC: 181 MG/DL — HIGH (ref 70–99)
GLUCOSE BLDC GLUCOMTR-MCNC: 182 MG/DL — HIGH (ref 70–99)
GLUCOSE BLDC GLUCOMTR-MCNC: 213 MG/DL — HIGH (ref 70–99)
GLUCOSE BLDC GLUCOMTR-MCNC: 227 MG/DL — HIGH (ref 70–99)
GLUCOSE SERPL-MCNC: 170 MG/DL — HIGH (ref 70–99)
HCT VFR BLD CALC: 38.3 % — LOW (ref 39–50)
HGB BLD-MCNC: 12.6 G/DL — LOW (ref 13–17)
MAGNESIUM SERPL-MCNC: 1.7 MG/DL — SIGNIFICANT CHANGE UP (ref 1.6–2.6)
MCHC RBC-ENTMCNC: 30.7 PG — SIGNIFICANT CHANGE UP (ref 27–34)
MCHC RBC-ENTMCNC: 32.9 GM/DL — SIGNIFICANT CHANGE UP (ref 32–36)
MCV RBC AUTO: 93.4 FL — SIGNIFICANT CHANGE UP (ref 80–100)
MRSA PCR RESULT.: SIGNIFICANT CHANGE UP
NRBC # BLD: 0 /100 WBCS — SIGNIFICANT CHANGE UP (ref 0–0)
PLATELET # BLD AUTO: 219 K/UL — SIGNIFICANT CHANGE UP (ref 150–400)
POTASSIUM SERPL-MCNC: 3.6 MMOL/L — SIGNIFICANT CHANGE UP (ref 3.5–5.3)
POTASSIUM SERPL-SCNC: 3.6 MMOL/L — SIGNIFICANT CHANGE UP (ref 3.5–5.3)
RBC # BLD: 4.1 M/UL — LOW (ref 4.2–5.8)
RBC # FLD: 12.5 % — SIGNIFICANT CHANGE UP (ref 10.3–14.5)
S AUREUS DNA NOSE QL NAA+PROBE: DETECTED
SODIUM SERPL-SCNC: 144 MMOL/L — SIGNIFICANT CHANGE UP (ref 135–145)
WBC # BLD: 11.25 K/UL — HIGH (ref 3.8–10.5)
WBC # FLD AUTO: 11.25 K/UL — HIGH (ref 3.8–10.5)

## 2024-03-22 PROCEDURE — 99233 SBSQ HOSP IP/OBS HIGH 50: CPT

## 2024-03-22 RX ORDER — DILTIAZEM HCL 120 MG
300 CAPSULE, EXT RELEASE 24 HR ORAL DAILY
Refills: 0 | Status: DISCONTINUED | OUTPATIENT
Start: 2024-03-23 | End: 2024-03-25

## 2024-03-22 RX ORDER — FUROSEMIDE 40 MG
40 TABLET ORAL ONCE
Refills: 0 | Status: COMPLETED | OUTPATIENT
Start: 2024-03-22 | End: 2024-03-22

## 2024-03-22 RX ORDER — DILTIAZEM HCL 120 MG
360 CAPSULE, EXT RELEASE 24 HR ORAL DAILY
Refills: 0 | Status: DISCONTINUED | OUTPATIENT
Start: 2024-03-23 | End: 2024-03-22

## 2024-03-22 RX ORDER — DILTIAZEM HCL 120 MG
90 CAPSULE, EXT RELEASE 24 HR ORAL EVERY 6 HOURS
Refills: 0 | Status: DISCONTINUED | OUTPATIENT
Start: 2024-03-22 | End: 2024-03-22

## 2024-03-22 RX ORDER — INSULIN LISPRO 100/ML
3 VIAL (ML) SUBCUTANEOUS
Refills: 0 | Status: DISCONTINUED | OUTPATIENT
Start: 2024-03-22 | End: 2024-03-24

## 2024-03-22 RX ORDER — DILTIAZEM HCL 120 MG
90 CAPSULE, EXT RELEASE 24 HR ORAL ONCE
Refills: 0 | Status: COMPLETED | OUTPATIENT
Start: 2024-03-22 | End: 2024-03-22

## 2024-03-22 RX ADMIN — ATORVASTATIN CALCIUM 40 MILLIGRAM(S): 80 TABLET, FILM COATED ORAL at 21:40

## 2024-03-22 RX ADMIN — Medication 5 MILLIGRAM(S): at 17:39

## 2024-03-22 RX ADMIN — Medication 90 MILLIGRAM(S): at 17:44

## 2024-03-22 RX ADMIN — Medication 25 MILLIGRAM(S): at 17:41

## 2024-03-22 RX ADMIN — TIOTROPIUM BROMIDE 2 PUFF(S): 18 CAPSULE ORAL; RESPIRATORY (INHALATION) at 06:50

## 2024-03-22 RX ADMIN — Medication 2: at 08:29

## 2024-03-22 RX ADMIN — Medication 5 MILLIGRAM(S): at 05:23

## 2024-03-22 RX ADMIN — ERTAPENEM SODIUM 120 MILLIGRAM(S): 1 INJECTION, POWDER, LYOPHILIZED, FOR SOLUTION INTRAMUSCULAR; INTRAVENOUS at 21:29

## 2024-03-22 RX ADMIN — Medication 90 MILLIGRAM(S): at 00:10

## 2024-03-22 RX ADMIN — Medication 2: at 17:40

## 2024-03-22 RX ADMIN — Medication 90 MILLIGRAM(S): at 12:36

## 2024-03-22 RX ADMIN — Medication 3 UNIT(S): at 17:40

## 2024-03-22 RX ADMIN — OXYCODONE HYDROCHLORIDE 10 MILLIGRAM(S): 5 TABLET ORAL at 05:22

## 2024-03-22 RX ADMIN — POLYETHYLENE GLYCOL 3350 17 GRAM(S): 17 POWDER, FOR SOLUTION ORAL at 17:39

## 2024-03-22 RX ADMIN — OXYCODONE HYDROCHLORIDE 10 MILLIGRAM(S): 5 TABLET ORAL at 06:00

## 2024-03-22 RX ADMIN — LORATADINE 10 MILLIGRAM(S): 10 TABLET ORAL at 12:35

## 2024-03-22 RX ADMIN — Medication 5 MILLIGRAM(S): at 21:29

## 2024-03-22 RX ADMIN — Medication 1 TABLET(S): at 12:35

## 2024-03-22 RX ADMIN — Medication 325 MILLIGRAM(S): at 05:22

## 2024-03-22 RX ADMIN — INSULIN GLARGINE 15 UNIT(S): 100 INJECTION, SOLUTION SUBCUTANEOUS at 08:29

## 2024-03-22 RX ADMIN — NALOXEGOL OXALATE 25 MILLIGRAM(S): 12.5 TABLET, FILM COATED ORAL at 12:37

## 2024-03-22 RX ADMIN — GABAPENTIN 300 MILLIGRAM(S): 400 CAPSULE ORAL at 05:22

## 2024-03-22 RX ADMIN — Medication 500 MILLIGRAM(S): at 12:35

## 2024-03-22 RX ADMIN — Medication 4: at 12:34

## 2024-03-22 RX ADMIN — APIXABAN 5 MILLIGRAM(S): 2.5 TABLET, FILM COATED ORAL at 05:23

## 2024-03-22 RX ADMIN — Medication 5 MILLIGRAM(S): at 12:36

## 2024-03-22 RX ADMIN — Medication 1 GRAM(S): at 05:22

## 2024-03-22 RX ADMIN — Medication 90 MILLIGRAM(S): at 05:22

## 2024-03-22 RX ADMIN — APIXABAN 5 MILLIGRAM(S): 2.5 TABLET, FILM COATED ORAL at 17:39

## 2024-03-22 RX ADMIN — Medication 40 MILLIGRAM(S): at 12:36

## 2024-03-22 RX ADMIN — OXYCODONE HYDROCHLORIDE 10 MILLIGRAM(S): 5 TABLET ORAL at 17:50

## 2024-03-22 RX ADMIN — Medication 250 MICROGRAM(S): at 05:22

## 2024-03-22 RX ADMIN — Medication 1000 UNIT(S): at 12:35

## 2024-03-22 RX ADMIN — OXYCODONE HYDROCHLORIDE 10 MILLIGRAM(S): 5 TABLET ORAL at 17:39

## 2024-03-22 RX ADMIN — Medication 325 MILLIGRAM(S): at 17:39

## 2024-03-22 RX ADMIN — Medication 250 MILLIGRAM(S): at 05:23

## 2024-03-22 RX ADMIN — Medication 25 MILLIGRAM(S): at 05:23

## 2024-03-22 RX ADMIN — Medication 250 MILLIGRAM(S): at 17:40

## 2024-03-22 RX ADMIN — Medication 3 UNIT(S): at 12:34

## 2024-03-22 RX ADMIN — Medication 81 MILLIGRAM(S): at 12:35

## 2024-03-22 RX ADMIN — SENNA PLUS 2 TABLET(S): 8.6 TABLET ORAL at 21:30

## 2024-03-22 RX ADMIN — PANTOPRAZOLE SODIUM 40 MILLIGRAM(S): 20 TABLET, DELAYED RELEASE ORAL at 05:32

## 2024-03-22 RX ADMIN — GABAPENTIN 300 MILLIGRAM(S): 400 CAPSULE ORAL at 17:39

## 2024-03-22 RX ADMIN — Medication 1 GRAM(S): at 17:40

## 2024-03-22 RX ADMIN — POLYETHYLENE GLYCOL 3350 17 GRAM(S): 17 POWDER, FOR SOLUTION ORAL at 05:22

## 2024-03-22 RX ADMIN — INSULIN GLARGINE 7 UNIT(S): 100 INJECTION, SOLUTION SUBCUTANEOUS at 21:30

## 2024-03-22 RX ADMIN — Medication 40 MILLIGRAM(S): at 05:22

## 2024-03-22 RX ADMIN — Medication 10 MILLIGRAM(S): at 12:35

## 2024-03-22 NOTE — PROGRESS NOTE ADULT - SUBJECTIVE AND OBJECTIVE BOX
[facility] -- Brittany Lutz, Reynaldo Ku Savella, , Gera Maya  Office # [phone]    Follow Up:      Subjective/Observations:     REVIEW OF SYSTEMS: All other review of systems is negative unless indicated above  PAST MEDICAL & SURGICAL HISTORY:  Diabetes      Diabetes mellitus with no complication      Afib      Hypertension      BPH (benign prostatic hyperplasia)      Perforated gastric ulcer  s/p emergent ex-lap omentopexy and plication 6/2019      Pulmonary embolism      History of non-ST elevation myocardial infarction (NSTEMI)      Osteomyelitis  s/p debridement      CAD S/P percutaneous coronary angioplasty      Cerebrovascular accident      H/O abdominal surgery      Perforated gastric ulcer      Traumatic amputation of left foot, initial encounter        MEDICATIONS  (STANDING):  apixaban 5 milliGRAM(s) Oral every 12 hours  ascorbic acid 500 milliGRAM(s) Oral daily  aspirin  chewable 81 milliGRAM(s) Oral daily  atorvastatin 40 milliGRAM(s) Oral at bedtime  bisacodyl 10 milliGRAM(s) Oral daily  cholecalciferol 1000 Unit(s) Oral daily  dextrose 5%. 1000 milliLiter(s) (100 mL/Hr) IV Continuous <Continuous>  dextrose 5%. 1000 milliLiter(s) (50 mL/Hr) IV Continuous <Continuous>  dextrose 50% Injectable 25 Gram(s) IV Push once  dextrose 50% Injectable 12.5 Gram(s) IV Push once  dextrose 50% Injectable 25 Gram(s) IV Push once  digoxin     Tablet 250 MICROGram(s) Oral daily  diltiazem    Tablet 90 milliGRAM(s) Oral every 6 hours  ertapenem  IVPB 1000 milliGRAM(s) IV Intermittent every 24 hours  ferrous    sulfate 325 milliGRAM(s) Oral two times a day  furosemide    Tablet 40 milliGRAM(s) Oral two times a day  gabapentin 300 milliGRAM(s) Oral every 12 hours  glucagon  Injectable 1 milliGRAM(s) IntraMuscular once  insulin glargine Injectable (LANTUS) 15 Unit(s) SubCutaneous every morning  insulin glargine Injectable (LANTUS) 7 Unit(s) SubCutaneous at bedtime  insulin lispro (ADMELOG) corrective regimen sliding scale   SubCutaneous three times a day before meals  insulin lispro (ADMELOG) corrective regimen sliding scale   SubCutaneous at bedtime  loratadine 10 milliGRAM(s) Oral daily  melatonin 5 milliGRAM(s) Oral at bedtime  metoclopramide 5 milliGRAM(s) Oral three times a day  metoprolol tartrate 25 milliGRAM(s) Oral two times a day  multivitamin/minerals 1 Tablet(s) Oral daily  naloxegol 25 milliGRAM(s) Oral daily  oxybutynin 5 milliGRAM(s) Oral two times a day  oxyCODONE    IR 10 milliGRAM(s) Oral two times a day  pantoprazole    Tablet 40 milliGRAM(s) Oral before breakfast  polyethylene glycol 3350 17 Gram(s) Oral two times a day  saccharomyces boulardii 250 milliGRAM(s) Oral two times a day  senna 2 Tablet(s) Oral at bedtime  sodium chloride 0.65% Nasal 1 Spray(s) Both Nostrils daily  sucralfate 1 Gram(s) Oral two times a day  tiotropium 2.5 MICROgram(s) Inhaler 2 Puff(s) Inhalation daily    MEDICATIONS  (PRN):  acetaminophen     Tablet .. 650 milliGRAM(s) Oral every 6 hours PRN Temp greater or equal to 38C (100.4F), Mild Pain (1 - 3)  albuterol    0.083% 2.5 milliGRAM(s) Nebulizer every 4 hours PRN Shortness of Breath and/or Wheezing  aluminum hydroxide/magnesium hydroxide/simethicone Suspension 30 milliLiter(s) Oral every 4 hours PRN Dyspepsia  dextrose Oral Gel 15 Gram(s) Oral once PRN Blood Glucose LESS THAN 70 milliGRAM(s)/deciliter  ondansetron Injectable 4 milliGRAM(s) IV Push every 6 hours PRN Nausea and/or Vomiting    Allergies    No Known Drug Allergies  fish (Hives)    Intolerances      Vital Signs Last 24 Hrs  T(C): 36.7 (22 Mar 2024 05:15), Max: 36.8 (21 Mar 2024 20:06)  T(F): 98 (22 Mar 2024 05:15), Max: 98.3 (21 Mar 2024 20:06)  HR: 73 (22 Mar 2024 05:15) (73 - 118)  BP: 118/77 (22 Mar 2024 05:15) (114/73 - 126/69)  BP(mean): --  RR: 18 (22 Mar 2024 05:15) (18 - 18)  SpO2: 94% (22 Mar 2024 05:15) (91% - 96%)    Parameters below as of 22 Mar 2024 05:15  Patient On (Oxygen Delivery Method): nasal cannula  O2 Flow (L/min): 2    I&O's Summary    21 Mar 2024 07:01  -  22 Mar 2024 07:00  --------------------------------------------------------  IN: 850 mL / OUT: 850 mL / NET: 0 mL        PHYSICAL EXAM:  TELE:   Constitutional: NAD, awake and alert, well-developed  HEENT: Moist Mucous Membranes, Anicteric  Pulmonary: Non-labored, breath sounds are clear bilaterally, No wheezing, rales or rhonchi  Cardiovascular: Regular, S1 and S2, No murmurs, rubs, gallops or clicks  Gastrointestinal: Bowel Sounds present, soft, nontender.   Lymph: No peripheral edema. No lymphadenopathy.  Skin: No visible rashes or ulcers.  Psych:  Mood & affect appropriate  LABS: All Labs Reviewed:                        12.1   7.98  )-----------( 194      ( 20 Mar 2024 06:15 )             37.9                         11.4   8.96  )-----------( 186      ( 19 Mar 2024 08:08 )             36.3     21 Mar 2024 08:27    146    |  107    |  17     ----------------------------<  217    4.1     |  33     |  1.10   20 Mar 2024 06:15    146    |  107    |  17     ----------------------------<  188    3.3     |  32     |  1.20   19 Mar 2024 08:08    145    |  109    |  17     ----------------------------<  176    3.5     |  29     |  1.20     Ca    9.3        21 Mar 2024 08:27  Ca    9.1        20 Mar 2024 06:15  Ca    8.7        19 Mar 2024 08:08  Phos  3.1       19 Mar 2024 08:08  Mg     1.6       21 Mar 2024 08:27  Mg     1.7       20 Mar 2024 06:15  Mg     1.9       19 Mar 2024 08:08            12 Lead ECG:   Ventricular Rate 154 BPM    QRS Duration 76 ms    Q-T Interval 282 ms    QTC Calculation(Bazett) 451 ms    R Axis 15 degrees    T Axis 194 degrees    Diagnosis Line *** Critical Test Result: High HR  Atrial fibrillation with rapid ventricular response  Low voltage QRS  Nonspecific ST and T wave abnormality    Confirmed by JOHN KU ([id number]) on 3/17/2024 7:30:13 AM (03-16-24 @ 23:50)      TRANSTHORACIC ECHOCARDIOGRAM REPORT  ________________________________________________________________________________                                      _______       Pt. Name:       SARAH MORRISON Study Date:    3/18/2024  MRN:            [id number]         YOB: 1970  Accession #:    [id number]        Age:           53 years  Account#:       [id number]       Gender:        M  Heart Rate:                      Height:        74.02 in (188.00 cm)  Rhythm:                          Weight:        244.71 lb (111.00 kg)  Blood Pressure: 100/60 mmHg      BSA/BMI:       2.37 m² / 31.41 kg/m²  ________________________________________________________________________________________  Referring Physician:    [id number] Hill Brizuela  Interpreting Physician: Mary Maya MD  Primary Sonographer:    Mounika FELDMAN    CPT:               ECHO TTE WO CON COMP W DOPP - 80154.m  Indication(s):     Dyspnea, unspecified - R06.00  Procedure:         Transthoracic echocardiogram with 2-D, M-mode and complete                     spectral and color flow Doppler.  Ordering Location: [facility]  Admission Status:  Inpatient  Study Information: Image quality for this study is technically difficult.    _______________________________________________________________________________________     CONCLUSIONS:      1. Technically difficult image quality.   2. Left ventricular cavity is normal in size. Left ventricular wall thickness is normal. Left ventricular systolic function is normal with an ejection fraction visually estimated at 50 to 55 %.   3. Normal right ventricular cavity size, with normal wall thickness, and mildly reduced systolic function.   4. The left atrium is moderately dilated.   5. The right atrium is moderately dilated.   6. Moderate to severe mitral regurgitation.   7. Mild to moderate tricuspid regurgitation.   8. Estimated pulmonary artery systolic pressure is 36 mmHg, consistent with mild pulmonary hypertension.   9. The inferior vena cava is dilated measuring 2.40 cm in diameter, (dilated >2.1cm) with normal inspiratory collapse (normal >50%) consistent with mildly elevated right atrial pressure (~8, range 5-10mmHg).  10. Trace pericardial effusion.    ________________________________________________________________________________________  FINDINGS:     Left Ventricle:  The left ventricular cavity is normal in size. Left ventricular wall thickness is normal. Left ventricular systolic function is normal with an ejection fraction visually estimated at 50 to 55%.The left ventricular diastolic function is indeterminate.     Right Ventricle:  The right ventricular cavity is normal in size, with normal wall thickness and mildly reduced systolic function.     Left Atrium:  The left atrium is moderately dilated.     Right Atrium:  The right atrium is moderately dilated.     Aortic Valve:  The aortic valve anatomy cannot be determined with normal systolic excursion. There is no aortic valve stenosis.     Mitral Valve:  There is mild calcification of the mitralvalve annulus. There is moderate to severe mitral regurgitation.     Tricuspid Valve:  Structurally normal tricuspid valve with normal leaflet excursion. There is mild to moderate tricuspid regurgitation. Estimated pulmonary artery systolic pressure is 36 mmHg, consistent with mild pulmonary hypertension.     Pulmonic Valve:  The pulmonic valve was not well visualized. There is trace pulmonic regurgitation.     Pericardium:  There is a trace pericardial effusion.     Systemic Veins:  The inferiorvena cava is dilated measuring 2.40 cm in diameter, (dilated >2.1cm) with normal inspiratory collapse (normal >50%) consistent with mildly elevated right atrial pressure (~8, range 5-10mmHg).  ____________________________________________________________________  QUANTITATIVE DATA:  Left Ventricle Measurements: (Indexed to BSA)     IVSd (2D):   1.9 cm  LVPWd (2D):  1.5 cm  LVIDd (2D):  5.3 cm  LVIDs (2D):  3.9 cm  LV Mass:     413 g  174.4 g/m²  Visualized LV EF%: 50 to 55%     MV E Vmax:    1.15 m/s  e' lateral:   10.90 cm/s  e' medial:    10.50 cm/s  E/e' lateral: 10.56  E/e' medial:  12.00  E/e' Average: 10.76  MV DT:        137 msec    Aorta Measurements: (Normal range) (Indexed to BSA)     Sinuses of Valsalva: 3.50 cm (3.1 - 3.7 cm)       Left Atrium Measurements: (Indexed to BSA)  LA Diam 2D: 3.99 cm       LVOT / RVOT/ Qp/Qs Data: (Indexed to BSA)  LVOT Diameter: 2.22 cm    Mitral Valve Measurements:     MV E Vmax: 1.2 m/s       Tricuspid Valve Measurements:     TR Vmax:          2.6m/s  TR Peak Gradient: 27.7 mmHg  RA Pressure:      8 mmHg  PASP:             36 mmHg    ________________________________________________________________________________________  Electronically signed on 3/19/2024 at 11:27:59 AM by Mary Maya MD         *** Final ***      [facility] -- Brittany Lutz, Reynaldo Ku Savella, , Gera Maya  Office # [phone]    Follow Up:  Afib with RVR    Subjective/Observations: Awake and alert.  States he had an episode of low Sat overnight while on RA.  Now on 2L/min.  Denies cough.  Denies CP or palpitations.  No tele events    REVIEW OF SYSTEMS: All other review of systems is negative unless indicated above  PAST MEDICAL & SURGICAL HISTORY:  Diabetes  Diabetes mellitus with no complication  Afib  Hypertension  BPH (benign prostatic hyperplasia)  Perforated gastric ulcer  s/p emergent ex-lap omentopexy and plication 6/2019  Pulmonary embolism  History of non-ST elevation myocardial infarction (NSTEMI)  Osteomyelitis  s/p debridement  CAD S/P percutaneous coronary angioplasty  Cerebrovascular accident  H/O abdominal surgery  Perforated gastric ulcer  Traumatic amputation of left foot, initial encounter    MEDICATIONS  (STANDING):  apixaban 5 milliGRAM(s) Oral every 12 hours  ascorbic acid 500 milliGRAM(s) Oral daily  aspirin  chewable 81 milliGRAM(s) Oral daily  atorvastatin 40 milliGRAM(s) Oral at bedtime  bisacodyl 10 milliGRAM(s) Oral daily  cholecalciferol 1000 Unit(s) Oral daily  dextrose 5%. 1000 milliLiter(s) (100 mL/Hr) IV Continuous <Continuous>  dextrose 5%. 1000 milliLiter(s) (50 mL/Hr) IV Continuous <Continuous>  dextrose 50% Injectable 25 Gram(s) IV Push once  dextrose 50% Injectable 12.5 Gram(s) IV Push once  dextrose 50% Injectable 25 Gram(s) IV Push once  digoxin     Tablet 250 MICROGram(s) Oral daily  diltiazem    Tablet 90 milliGRAM(s) Oral every 6 hours  ertapenem  IVPB 1000 milliGRAM(s) IV Intermittent every 24 hours  ferrous    sulfate 325 milliGRAM(s) Oral two times a day  furosemide    Tablet 40 milliGRAM(s) Oral two times a day  gabapentin 300 milliGRAM(s) Oral every 12 hours  glucagon  Injectable 1 milliGRAM(s) IntraMuscular once  insulin glargine Injectable (LANTUS) 15 Unit(s) SubCutaneous every morning  insulin glargine Injectable (LANTUS) 7 Unit(s) SubCutaneous at bedtime  insulin lispro (ADMELOG) corrective regimen sliding scale   SubCutaneous three times a day before meals  insulin lispro (ADMELOG) corrective regimen sliding scale   SubCutaneous at bedtime  loratadine 10 milliGRAM(s) Oral daily  melatonin 5 milliGRAM(s) Oral at bedtime  metoclopramide 5 milliGRAM(s) Oral three times a day  metoprolol tartrate 25 milliGRAM(s) Oral two times a day  multivitamin/minerals 1 Tablet(s) Oral daily  naloxegol 25 milliGRAM(s) Oral daily  oxybutynin 5 milliGRAM(s) Oral two times a day  oxyCODONE    IR 10 milliGRAM(s) Oral two times a day  pantoprazole    Tablet 40 milliGRAM(s) Oral before breakfast  polyethylene glycol 3350 17 Gram(s) Oral two times a day  saccharomyces boulardii 250 milliGRAM(s) Oral two times a day  senna 2 Tablet(s) Oral at bedtime  sodium chloride 0.65% Nasal 1 Spray(s) Both Nostrils daily  sucralfate 1 Gram(s) Oral two times a day  tiotropium 2.5 MICROgram(s) Inhaler 2 Puff(s) Inhalation daily    MEDICATIONS  (PRN):  acetaminophen     Tablet .. 650 milliGRAM(s) Oral every 6 hours PRN Temp greater or equal to 38C (100.4F), Mild Pain (1 - 3)  albuterol    0.083% 2.5 milliGRAM(s) Nebulizer every 4 hours PRN Shortness of Breath and/or Wheezing  aluminum hydroxide/magnesium hydroxide/simethicone Suspension 30 milliLiter(s) Oral every 4 hours PRN Dyspepsia  dextrose Oral Gel 15 Gram(s) Oral once PRN Blood Glucose LESS THAN 70 milliGRAM(s)/deciliter  ondansetron Injectable 4 milliGRAM(s) IV Push every 6 hours PRN Nausea and/or Vomiting    Allergies    No Known Drug Allergies  fish (Hives)    Intolerances    Vital Signs Last 24 Hrs  T(C): 36.7 (22 Mar 2024 05:15), Max: 36.8 (21 Mar 2024 20:06)  T(F): 98 (22 Mar 2024 05:15), Max: 98.3 (21 Mar 2024 20:06)  HR: 73 (22 Mar 2024 05:15) (73 - 118)  BP: 118/77 (22 Mar 2024 05:15) (114/73 - 126/69)  BP(mean): --  RR: 18 (22 Mar 2024 05:15) (18 - 18)  SpO2: 94% (22 Mar 2024 05:15) (91% - 96%)    Parameters below as of 22 Mar 2024 05:15  Patient On (Oxygen Delivery Method): nasal cannula  O2 Flow (L/min): 2    I&O's Summary    21 Mar 2024 07:01  -  22 Mar 2024 07:00  --------------------------------------------------------  IN: 850 mL / OUT: 850 mL / NET: 0 mL      PHYSICAL EXAM:  TELE: Afib  Constitutional: NAD, awake and alert  HEENT: Moist Mucous Membranes, Anicteric  Pulmonary: Non-labored, breath sounds are diminished bilaterally, No wheezing, +rales no rhonchi  Cardiovascular: IRRR, S1 and S2, +murmurs, rubs, gallops or clicks  Gastrointestinal: Bowel Sounds present, soft, nontender.   Lymph: No peripheral edema. No lymphadenopathy.  Skin: No visible rashes.  Right heel ulcer with dressing dry and intact  Psych:  Mood & affect appropriate  LABS: All Labs Reviewed:                        12.1   7.98  )-----------( 194      ( 20 Mar 2024 06:15 )             37.9                         11.4   8.96  )-----------( 186      ( 19 Mar 2024 08:08 )             36.3     21 Mar 2024 08:27    146    |  107    |  17     ----------------------------<  217    4.1     |  33     |  1.10   20 Mar 2024 06:15    146    |  107    |  17     ----------------------------<  188    3.3     |  32     |  1.20   19 Mar 2024 08:08    145    |  109    |  17     ----------------------------<  176    3.5     |  29     |  1.20     Ca    9.3        21 Mar 2024 08:27  Ca    9.1        20 Mar 2024 06:15  Ca    8.7        19 Mar 2024 08:08  Phos  3.1       19 Mar 2024 08:08  Mg     1.6       21 Mar 2024 08:27  Mg     1.7       20 Mar 2024 06:15  Mg     1.9       19 Mar 2024 08:08            12 Lead ECG:   Ventricular Rate 154 BPM    QRS Duration 76 ms    Q-T Interval 282 ms    QTC Calculation(Bazett) 451 ms    R Axis 15 degrees    T Axis 194 degrees    Diagnosis Line *** Critical Test Result: High HR  Atrial fibrillation with rapid ventricular response  Low voltage QRS  Nonspecific ST and T wave abnormality    Confirmed by JOHN KU ([id number]) on 3/17/2024 7:30:13 AM (03-16-24 @ 23:50)      TRANSTHORACIC ECHOCARDIOGRAM REPORT  ________________________________________________________________________________                                      _______    Pt. Name:       SARAH MORRISON Study Date:    3/18/2024  MRN:            [id number]         YOB: 1970  Accession #:    [id number]        Age:           53 years  Account#:       [id number]       Gender:        M  Heart Rate:                      Height:        74.02 in (188.00 cm)  Rhythm:                          Weight:        244.71 lb (111.00 kg)  Blood Pressure: 100/60 mmHg      BSA/BMI:       2.37 m² / 31.41 kg/m²  ________________________________________________________________________________________  Referring Physician:    [id number] Hill Brizuela  Interpreting Physician: Mary Maya MD  Primary Sonographer:    Mounika FELDMAN    CPT:               ECHO TTE WO CON COMP W DOPP - 49082.m  Indication(s):     Dyspnea, unspecified - R06.00  Procedure:         Transthoracic echocardiogram with 2-D, M-mode and complete                     spectral and color flow Doppler.  Ordering Location: [facility]  Admission Status:  Inpatient  Study Information: Image quality for this study is technically difficult.  _____________________________________________________________________________________     CONCLUSIONS:      1. Technically difficult image quality.   2. Left ventricular cavity is normal in size. Left ventricular wall thickness is normal. Left ventricular systolic function is normal with an ejection fraction visually estimated at 50 to 55 %.   3. Normal right ventricular cavity size, with normal wall thickness, and mildly reduced systolic function.   4. The left atrium is moderately dilated.   5. The right atrium is moderately dilated.   6. Moderate to severe mitral regurgitation.   7. Mild to moderate tricuspid regurgitation.   8. Estimated pulmonary artery systolic pressure is 36 mmHg, consistent with mild pulmonary hypertension.   9. The inferior vena cava is dilated measuring 2.40 cm in diameter, (dilated >2.1cm) with normal inspiratory collapse (normal >50%) consistent with mildly elevated right atrial pressure (~8, range 5-10mmHg).  10. Trace pericardial effusion.  _______________________________________________________________________________________  FINDINGS:     Left Ventricle:  The left ventricular cavity is normal in size. Left ventricular wall thickness is normal. Left ventricular systolic function is normal with an ejection fraction visually estimated at 50 to 55%.The left ventricular diastolic function is indeterminate.     Right Ventricle:  The right ventricular cavity is normal in size, with normal wall thickness and mildly reduced systolic function.     Left Atrium:  The left atrium is moderately dilated.     Right Atrium:  The right atrium is moderately dilated.     Aortic Valve:  The aortic valve anatomy cannot be determined with normal systolic excursion. There is no aortic valve stenosis.     Mitral Valve:  There is mild calcification of the mitralvalve annulus. There is moderate to severe mitral regurgitation.     Tricuspid Valve:  Structurally normal tricuspid valve with normal leaflet excursion. There is mild to moderate tricuspid regurgitation. Estimated pulmonary artery systolic pressure is 36 mmHg, consistent with mild pulmonary hypertension.     Pulmonic Valve:  The pulmonic valve was not well visualized. There is trace pulmonic regurgitation.     Pericardium:  There is a trace pericardial effusion.     Systemic Veins:  The inferiorvena cava is dilated measuring 2.40 cm in diameter, (dilated >2.1cm) with normal inspiratory collapse (normal >50%) consistent with mildly elevated right atrial pressure (~8, range 5-10mmHg).  ____________________________________________________________________  QUANTITATIVE DATA:  Left Ventricle Measurements: (Indexed to BSA)     IVSd (2D):   1.9 cm  LVPWd (2D):  1.5 cm  LVIDd (2D):  5.3 cm  LVIDs (2D):  3.9 cm  LV Mass:     413 g  174.4 g/m²  Visualized LV EF%: 50 to 55%     MV E Vmax:    1.15 m/s  e' lateral:   10.90 cm/s  e' medial:    10.50 cm/s  E/e' lateral: 10.56  E/e' medial:  12.00  E/e' Average: 10.76  MV DT:        137 msec    Aorta Measurements: (Normal range) (Indexed to BSA)     Sinuses of Valsalva: 3.50 cm (3.1 - 3.7 cm)       Left Atrium Measurements: (Indexed to BSA)  LA Diam 2D: 3.99 cm       LVOT / RVOT/ Qp/Qs Data: (Indexed to BSA)  LVOT Diameter: 2.22 cm    Mitral Valve Measurements:     MV E Vmax: 1.2 m/s       Tricuspid Valve Measurements:     TR Vmax:          2.6m/s  TR Peak Gradient: 27.7 mmHg  RA Pressure:      8 mmHg  PASP:             36 mmHg    ________________________________________________________________________________________  Electronically signed on 3/19/2024 at 11:27:59 AM by Mary Maya MD         *** Final ***      [facility] -- Brittany Lutz, Reynaldo Ku Savella, , Gera Maya  Office # [phone]    Follow Up:  Afib with RVR    Subjective/Observations: Awake and alert.  States he had an episode of low Sat overnight while on RA.  Now on 2L/min.  Denies cough.  Denies CP or palpitations.  No tele events    REVIEW OF SYSTEMS: All other review of systems is negative unless indicated above  PAST MEDICAL & SURGICAL HISTORY:  Diabetes  Diabetes mellitus with no complication  Afib  Hypertension  BPH (benign prostatic hyperplasia)  Perforated gastric ulcer  s/p emergent ex-lap omentopexy and plication 6/2019  Pulmonary embolism  History of non-ST elevation myocardial infarction (NSTEMI)  Osteomyelitis  s/p debridement  CAD S/P percutaneous coronary angioplasty  Cerebrovascular accident  H/O abdominal surgery  Perforated gastric ulcer  Traumatic amputation of left foot, initial encounter    MEDICATIONS  (STANDING):  apixaban 5 milliGRAM(s) Oral every 12 hours  ascorbic acid 500 milliGRAM(s) Oral daily  aspirin  chewable 81 milliGRAM(s) Oral daily  atorvastatin 40 milliGRAM(s) Oral at bedtime  bisacodyl 10 milliGRAM(s) Oral daily  cholecalciferol 1000 Unit(s) Oral daily  dextrose 5%. 1000 milliLiter(s) (100 mL/Hr) IV Continuous <Continuous>  dextrose 5%. 1000 milliLiter(s) (50 mL/Hr) IV Continuous <Continuous>  dextrose 50% Injectable 25 Gram(s) IV Push once  dextrose 50% Injectable 12.5 Gram(s) IV Push once  dextrose 50% Injectable 25 Gram(s) IV Push once  digoxin     Tablet 250 MICROGram(s) Oral daily  diltiazem    Tablet 90 milliGRAM(s) Oral every 6 hours  ertapenem  IVPB 1000 milliGRAM(s) IV Intermittent every 24 hours  ferrous    sulfate 325 milliGRAM(s) Oral two times a day  furosemide    Tablet 40 milliGRAM(s) Oral two times a day  gabapentin 300 milliGRAM(s) Oral every 12 hours  glucagon  Injectable 1 milliGRAM(s) IntraMuscular once  insulin glargine Injectable (LANTUS) 15 Unit(s) SubCutaneous every morning  insulin glargine Injectable (LANTUS) 7 Unit(s) SubCutaneous at bedtime  insulin lispro (ADMELOG) corrective regimen sliding scale   SubCutaneous three times a day before meals  insulin lispro (ADMELOG) corrective regimen sliding scale   SubCutaneous at bedtime  loratadine 10 milliGRAM(s) Oral daily  melatonin 5 milliGRAM(s) Oral at bedtime  metoclopramide 5 milliGRAM(s) Oral three times a day  metoprolol tartrate 25 milliGRAM(s) Oral two times a day  multivitamin/minerals 1 Tablet(s) Oral daily  naloxegol 25 milliGRAM(s) Oral daily  oxybutynin 5 milliGRAM(s) Oral two times a day  oxyCODONE    IR 10 milliGRAM(s) Oral two times a day  pantoprazole    Tablet 40 milliGRAM(s) Oral before breakfast  polyethylene glycol 3350 17 Gram(s) Oral two times a day  saccharomyces boulardii 250 milliGRAM(s) Oral two times a day  senna 2 Tablet(s) Oral at bedtime  sodium chloride 0.65% Nasal 1 Spray(s) Both Nostrils daily  sucralfate 1 Gram(s) Oral two times a day  tiotropium 2.5 MICROgram(s) Inhaler 2 Puff(s) Inhalation daily    MEDICATIONS  (PRN):  acetaminophen     Tablet .. 650 milliGRAM(s) Oral every 6 hours PRN Temp greater or equal to 38C (100.4F), Mild Pain (1 - 3)  albuterol    0.083% 2.5 milliGRAM(s) Nebulizer every 4 hours PRN Shortness of Breath and/or Wheezing  aluminum hydroxide/magnesium hydroxide/simethicone Suspension 30 milliLiter(s) Oral every 4 hours PRN Dyspepsia  dextrose Oral Gel 15 Gram(s) Oral once PRN Blood Glucose LESS THAN 70 milliGRAM(s)/deciliter  ondansetron Injectable 4 milliGRAM(s) IV Push every 6 hours PRN Nausea and/or Vomiting    Allergies    No Known Drug Allergies  fish (Hives)    Intolerances    Vital Signs Last 24 Hrs  T(C): 36.7 (22 Mar 2024 05:15), Max: 36.8 (21 Mar 2024 20:06)  T(F): 98 (22 Mar 2024 05:15), Max: 98.3 (21 Mar 2024 20:06)  HR: 73 (22 Mar 2024 05:15) (73 - 118)  BP: 118/77 (22 Mar 2024 05:15) (114/73 - 126/69)  BP(mean): --  RR: 18 (22 Mar 2024 05:15) (18 - 18)  SpO2: 94% (22 Mar 2024 05:15) (91% - 96%)    Parameters below as of 22 Mar 2024 05:15  Patient On (Oxygen Delivery Method): nasal cannula  O2 Flow (L/min): 2    I&O's Summary    21 Mar 2024 07:01  -  22 Mar 2024 07:00  --------------------------------------------------------  IN: 850 mL / OUT: 850 mL / NET: 0 mL      PHYSICAL EXAM:  TELE: Afib  Constitutional: NAD, awake and alert  HEENT: Moist Mucous Membranes, Anicteric  Pulmonary: Non-labored, breath sounds are diminished bilaterally, No wheezing, +rales no rhonchi  Cardiovascular: IRRR, S1 and S2, +murmurs, rubs, gallops or clicks  Gastrointestinal: Bowel Sounds present, soft, nontender.   Lymph: No peripheral edema. No lymphadenopathy.  Skin: No visible rashes.  Right heel ulcer with dressing dry and intact  Psych:  Mood & affect appropriate  LABS: All Labs Reviewed:                        12.1   7.98  )-----------( 194      ( 20 Mar 2024 06:15 )             37.9                         11.4   8.96  )-----------( 186      ( 19 Mar 2024 08:08 )             36.3     21 Mar 2024 08:27    146    |  107    |  17     ----------------------------<  217    4.1     |  33     |  1.10   20 Mar 2024 06:15    146    |  107    |  17     ----------------------------<  188    3.3     |  32     |  1.20   19 Mar 2024 08:08    145    |  109    |  17     ----------------------------<  176    3.5     |  29     |  1.20     Ca    9.3        21 Mar 2024 08:27  Ca    9.1        20 Mar 2024 06:15  Ca    8.7        19 Mar 2024 08:08  Phos  3.1       19 Mar 2024 08:08  Mg     1.6       21 Mar 2024 08:27  Mg     1.7       20 Mar 2024 06:15  Mg     1.9       19 Mar 2024 08:08            12 Lead ECG:   Ventricular Rate 154 BPM    QRS Duration 76 ms    Q-T Interval 282 ms    QTC Calculation(Bazett) 451 ms    R Axis 15 degrees    T Axis 194 degrees    Diagnosis Line *** Critical Test Result: High HR  Atrial fibrillation with rapid ventricular response  Low voltage QRS  Nonspecific ST and T wave abnormality    Confirmed by JOHN KU ([id number]) on 3/17/2024 7:30:13 AM (03-16-24 @ 23:50)      TRANSTHORACIC ECHOCARDIOGRAM REPORT  ________________________________________________________________________________                                      _______    Pt. Name:       SARAH MORRISON Study Date:    3/18/2024  MRN:            [id number]         YOB: 1970  Accession #:    [id number]        Age:           53 years  Account#:       [id number]       Gender:        M  Heart Rate:                      Height:        74.02 in (188.00 cm)  Rhythm:                          Weight:        244.71 lb (111.00 kg)  Blood Pressure: 100/60 mmHg      BSA/BMI:       2.37 m² / 31.41 kg/m²  ________________________________________________________________________________________  Referring Physician:    [id number] Hill Brizuela  Interpreting Physician: Mary Maya MD  Primary Sonographer:    Mounika FELDMAN    CPT:               ECHO TTE WO CON COMP W DOPP - 75339.m  Indication(s):     Dyspnea, unspecified - R06.00  Procedure:         Transthoracic echocardiogram with 2-D, M-mode and complete                     spectral and color flow Doppler.  Ordering Location: [facility]  Admission Status:  Inpatient  Study Information: Image quality for this study is technically difficult.  _____________________________________________________________________________________     CONCLUSIONS:      1. Technically difficult image quality.   2. Left ventricular cavity is normal in size. Left ventricular wall thickness is normal. Left ventricular systolic function is normal with an ejection fraction visually estimated at 50 to 55 %.   3. Normal right ventricular cavity size, with normal wall thickness, and mildly reduced systolic function.   4. The left atrium is moderately dilated.   5. The right atrium is moderately dilated.   6. Moderate to severe mitral regurgitation.   7. Mild to moderate tricuspid regurgitation.   8. Estimated pulmonary artery systolic pressure is 36 mmHg, consistent with mild pulmonary hypertension.   9. The inferior vena cava is dilated measuring 2.40 cm in diameter, (dilated >2.1cm) with normal inspiratory collapse (normal >50%) consistent with mildly elevated right atrial pressure (~8, range 5-10mmHg).  10. Trace pericardial effusion.  _______________________________________________________________________________________  FINDINGS:     Left Ventricle:  The left ventricular cavity is normal in size. Left ventricular wall thickness is normal. Left ventricular systolic function is normal with an ejection fraction visually estimated at 50 to 55%.The left ventricular diastolic function is indeterminate.     Right Ventricle:  The right ventricular cavity is normal in size, with normal wall thickness and mildly reduced systolic function.     Left Atrium:  The left atrium is moderately dilated.     Right Atrium:  The right atrium is moderately dilated.     Aortic Valve:  The aortic valve anatomy cannot be determined with normal systolic excursion. There is no aortic valve stenosis.     Mitral Valve:  There is mild calcification of the mitralvalve annulus. There is moderate to severe mitral regurgitation.     Tricuspid Valve:  Structurally normal tricuspid valve with normal leaflet excursion. There is mild to moderate tricuspid regurgitation. Estimated pulmonary artery systolic pressure is 36 mmHg, consistent with mild pulmonary hypertension.     Pulmonic Valve:  The pulmonic valve was not well visualized. There is trace pulmonic regurgitation.     Pericardium:  There is a trace pericardial effusion.     Systemic Veins:  The inferiorvena cava is dilated measuring 2.40 cm in diameter, (dilated >2.1cm) with normal inspiratory collapse (normal >50%) consistent with mildly elevated right atrial pressure (~8, range 5-10mmHg).  ____________________________________________________________________  QUANTITATIVE DATA:  Left Ventricle Measurements: (Indexed to BSA)     IVSd (2D):   1.9 cm  LVPWd (2D):  1.5 cm  LVIDd (2D):  5.3 cm  LVIDs (2D):  3.9 cm  LV Mass:     413 g  174.4 g/m²  Visualized LV EF%: 50 to 55%     MV E Vmax:    1.15 m/s  e' lateral:   10.90 cm/s  e' medial:    10.50 cm/s  E/e' lateral: 10.56  E/e' medial:  12.00  E/e' Average: 10.76  MV DT:        137 msec    Aorta Measurements: (Normal range) (Indexed to BSA)     Sinuses of Valsalva: 3.50 cm (3.1 - 3.7 cm)       Left Atrium Measurements: (Indexed to BSA)  LA Diam 2D: 3.99 cm       LVOT / RVOT/ Qp/Qs Data: (Indexed to BSA)  LVOT Diameter: 2.22 cm    Mitral Valve Measurements:     MV E Vmax: 1.2 m/s       Tricuspid Valve Measurements:     TR Vmax:          2.6m/s  TR Peak Gradient: 27.7 mmHg  RA Pressure:      8 mmHg  PASP:             36 mmHg    ________________________________________________________________________________________  Electronically signed on 3/19/2024 at 11:27:59 AM by Mary Maya MD         *** Final ***

## 2024-03-22 NOTE — PROGRESS NOTE ADULT - SUBJECTIVE AND OBJECTIVE BOX
Date/Time Patient Seen:  		  Referring MD:   Data Reviewed	       Patient is a 53y old  Male who presents with a chief complaint of fever (21 Mar 2024 12:09)      Subjective/HPI     PAST MEDICAL & SURGICAL HISTORY:  No pertinent past medical history    Diabetes    No pertinent past medical history    Diabetes mellitus with no complication    Afib    Hypertension    BPH (benign prostatic hyperplasia)    Perforated gastric ulcer  s/p emergent ex-lap omentopexy and plication 6/2019    Pulmonary embolism    History of non-ST elevation myocardial infarction (NSTEMI)    Osteomyelitis  s/p debridement    CAD S/P percutaneous coronary angioplasty    Cerebrovascular accident    No significant past surgical history    No significant past surgical history    H/O abdominal surgery    Perforated gastric ulcer    Traumatic amputation of left foot, initial encounter          Medication list         MEDICATIONS  (STANDING):  apixaban 5 milliGRAM(s) Oral every 12 hours  ascorbic acid 500 milliGRAM(s) Oral daily  aspirin  chewable 81 milliGRAM(s) Oral daily  atorvastatin 40 milliGRAM(s) Oral at bedtime  bisacodyl 10 milliGRAM(s) Oral daily  cholecalciferol 1000 Unit(s) Oral daily  dextrose 5%. 1000 milliLiter(s) (100 mL/Hr) IV Continuous <Continuous>  dextrose 5%. 1000 milliLiter(s) (50 mL/Hr) IV Continuous <Continuous>  dextrose 50% Injectable 25 Gram(s) IV Push once  dextrose 50% Injectable 12.5 Gram(s) IV Push once  dextrose 50% Injectable 25 Gram(s) IV Push once  digoxin     Tablet 250 MICROGram(s) Oral daily  diltiazem    Tablet 90 milliGRAM(s) Oral every 6 hours  ertapenem  IVPB 1000 milliGRAM(s) IV Intermittent every 24 hours  ferrous    sulfate 325 milliGRAM(s) Oral two times a day  furosemide    Tablet 40 milliGRAM(s) Oral two times a day  gabapentin 300 milliGRAM(s) Oral every 12 hours  glucagon  Injectable 1 milliGRAM(s) IntraMuscular once  insulin glargine Injectable (LANTUS) 15 Unit(s) SubCutaneous every morning  insulin glargine Injectable (LANTUS) 7 Unit(s) SubCutaneous at bedtime  insulin lispro (ADMELOG) corrective regimen sliding scale   SubCutaneous three times a day before meals  insulin lispro (ADMELOG) corrective regimen sliding scale   SubCutaneous at bedtime  loratadine 10 milliGRAM(s) Oral daily  melatonin 5 milliGRAM(s) Oral at bedtime  metoclopramide 5 milliGRAM(s) Oral three times a day  metoprolol tartrate 25 milliGRAM(s) Oral two times a day  multivitamin/minerals 1 Tablet(s) Oral daily  naloxegol 25 milliGRAM(s) Oral daily  oxybutynin 5 milliGRAM(s) Oral two times a day  oxyCODONE    IR 10 milliGRAM(s) Oral two times a day  pantoprazole    Tablet 40 milliGRAM(s) Oral before breakfast  polyethylene glycol 3350 17 Gram(s) Oral two times a day  saccharomyces boulardii 250 milliGRAM(s) Oral two times a day  senna 2 Tablet(s) Oral at bedtime  sodium chloride 0.65% Nasal 1 Spray(s) Both Nostrils daily  sucralfate 1 Gram(s) Oral two times a day  tiotropium 2.5 MICROgram(s) Inhaler 2 Puff(s) Inhalation daily    MEDICATIONS  (PRN):  acetaminophen     Tablet .. 650 milliGRAM(s) Oral every 6 hours PRN Temp greater or equal to 38C (100.4F), Mild Pain (1 - 3)  albuterol    0.083% 2.5 milliGRAM(s) Nebulizer every 4 hours PRN Shortness of Breath and/or Wheezing  aluminum hydroxide/magnesium hydroxide/simethicone Suspension 30 milliLiter(s) Oral every 4 hours PRN Dyspepsia  dextrose Oral Gel 15 Gram(s) Oral once PRN Blood Glucose LESS THAN 70 milliGRAM(s)/deciliter  ondansetron Injectable 4 milliGRAM(s) IV Push every 6 hours PRN Nausea and/or Vomiting         Vitals log        ICU Vital Signs Last 24 Hrs  T(C): 36.7 (22 Mar 2024 05:15), Max: 36.8 (21 Mar 2024 20:06)  T(F): 98 (22 Mar 2024 05:15), Max: 98.3 (21 Mar 2024 20:06)  HR: 73 (22 Mar 2024 05:15) (73 - 118)  BP: 118/77 (22 Mar 2024 05:15) (114/73 - 126/69)  BP(mean): --  ABP: --  ABP(mean): --  RR: 18 (22 Mar 2024 05:15) (18 - 18)  SpO2: 94% (22 Mar 2024 05:15) (91% - 96%)    O2 Parameters below as of 22 Mar 2024 05:15  Patient On (Oxygen Delivery Method): nasal cannula  O2 Flow (L/min): 2               Input and Output:  I&O's Detail    20 Mar 2024 07:01  -  21 Mar 2024 07:00  --------------------------------------------------------  IN:    Oral Fluid: 840 mL  Total IN: 840 mL    OUT:    Indwelling Catheter - Urethral (mL): 2775 mL  Total OUT: 2775 mL    Total NET: -1935 mL      21 Mar 2024 07:01  -  22 Mar 2024 05:54  --------------------------------------------------------  IN:    Oral Fluid: 850 mL  Total IN: 850 mL    OUT:    Voided (mL): 850 mL  Total OUT: 850 mL    Total NET: 0 mL          Lab Data                        12.1   7.98  )-----------( 194      ( 20 Mar 2024 06:15 )             37.9     03-21    146<H>  |  107  |  17  ----------------------------<  217<H>  4.1   |  33<H>  |  1.10    Ca    9.3      21 Mar 2024 08:27  Mg     1.6     03-21              Review of Systems	      Objective     Physical Examination    heart s1s2  lung dc bS  head nc      Pertinent Lab findings & Imaging      Ale:  NO   Adequate UO     I&O's Detail    20 Mar 2024 07:01  -  21 Mar 2024 07:00  --------------------------------------------------------  IN:    Oral Fluid: 840 mL  Total IN: 840 mL    OUT:    Indwelling Catheter - Urethral (mL): 2775 mL  Total OUT: 2775 mL    Total NET: -1935 mL      21 Mar 2024 07:01  -  22 Mar 2024 05:54  --------------------------------------------------------  IN:    Oral Fluid: 850 mL  Total IN: 850 mL    OUT:    Voided (mL): 850 mL  Total OUT: 850 mL    Total NET: 0 mL               Discussed with:     Cultures:	        Radiology

## 2024-03-22 NOTE — PROGRESS NOTE ADULT - PROBLEM SELECTOR PLAN 3
[FreeTextEntry1] : Pap done today. \par \par Prescription for mammogram screening and breast US given. \par \par Self-breast exam reviewed. \par \par She will follow up annually and as needed.  Continue iv abx and wound care  Unable to have MRI b/c of brain aneurysm clips  Will have R heel debridement and bone biopsy on 3/26  Podiatry f/u

## 2024-03-22 NOTE — PROGRESS NOTE ADULT - SUBJECTIVE AND OBJECTIVE BOX
[facility] DIVISION of INFECTIOUS DISEASE  Juventino Whitten MD PhD, Symone Hickey MD, Anne-Marie Li MD, Jeffery Jacobs MD, Ryan Romeo MD  and providing coverage with Melody Armstrong MD  Providing Infectious Disease Consultations at [facility], [facility], [facility]'s    Office# [phone] to schedule follow up appointments  Answering Service for urgent calls or New Consults [phone]  Cell# to text for urgent issues Juventino Whitten [phone]     infectious diseases progress note:    SARAH MORRISON is a 53y y. o. Male patient    Overnight and events of the last 24hrs reviewed    Allergies    No Known Drug Allergies  fish (Hives)    Intolerances        ANTIBIOTICS/RELEVANT:  antimicrobials  ertapenem  IVPB 1000 milliGRAM(s) IV Intermittent every 24 hours    immunologic:    OTHER:  acetaminophen     Tablet .. 650 milliGRAM(s) Oral every 6 hours PRN  albuterol    0.083% 2.5 milliGRAM(s) Nebulizer every 4 hours PRN  aluminum hydroxide/magnesium hydroxide/simethicone Suspension 30 milliLiter(s) Oral every 4 hours PRN  apixaban 5 milliGRAM(s) Oral every 12 hours  ascorbic acid 500 milliGRAM(s) Oral daily  aspirin  chewable 81 milliGRAM(s) Oral daily  atorvastatin 40 milliGRAM(s) Oral at bedtime  bisacodyl 10 milliGRAM(s) Oral daily  cholecalciferol 1000 Unit(s) Oral daily  dextrose 5%. 1000 milliLiter(s) IV Continuous <Continuous>  dextrose 5%. 1000 milliLiter(s) IV Continuous <Continuous>  dextrose 50% Injectable 25 Gram(s) IV Push once  dextrose 50% Injectable 12.5 Gram(s) IV Push once  dextrose 50% Injectable 25 Gram(s) IV Push once  dextrose Oral Gel 15 Gram(s) Oral once PRN  digoxin     Tablet 250 MICROGram(s) Oral daily  diltiazem    Tablet 90 milliGRAM(s) Oral every 6 hours  diltiazem    milliGRAM(s) Oral daily  ferrous    sulfate 325 milliGRAM(s) Oral two times a day  furosemide    Tablet 40 milliGRAM(s) Oral two times a day  furosemide   Injectable 40 milliGRAM(s) IV Push once  gabapentin 300 milliGRAM(s) Oral every 12 hours  glucagon  Injectable 1 milliGRAM(s) IntraMuscular once  insulin glargine Injectable (LANTUS) 15 Unit(s) SubCutaneous every morning  insulin glargine Injectable (LANTUS) 7 Unit(s) SubCutaneous at bedtime  insulin lispro (ADMELOG) corrective regimen sliding scale   SubCutaneous three times a day before meals  insulin lispro (ADMELOG) corrective regimen sliding scale   SubCutaneous at bedtime  insulin lispro Injectable (ADMELOG) 3 Unit(s) SubCutaneous three times a day before meals  loratadine 10 milliGRAM(s) Oral daily  melatonin 5 milliGRAM(s) Oral at bedtime  metoclopramide 5 milliGRAM(s) Oral three times a day  metoprolol tartrate 25 milliGRAM(s) Oral two times a day  multivitamin/minerals 1 Tablet(s) Oral daily  naloxegol 25 milliGRAM(s) Oral daily  ondansetron Injectable 4 milliGRAM(s) IV Push every 6 hours PRN  oxybutynin 5 milliGRAM(s) Oral two times a day  oxyCODONE    IR 10 milliGRAM(s) Oral two times a day  pantoprazole    Tablet 40 milliGRAM(s) Oral before breakfast  polyethylene glycol 3350 17 Gram(s) Oral two times a day  saccharomyces boulardii 250 milliGRAM(s) Oral two times a day  senna 2 Tablet(s) Oral at bedtime  sodium chloride 0.65% Nasal 1 Spray(s) Both Nostrils daily  sucralfate 1 Gram(s) Oral two times a day  tiotropium 2.5 MICROgram(s) Inhaler 2 Puff(s) Inhalation daily      Objective:  Vital Signs Last 24 Hrs  T(C): 36.7 (22 Mar 2024 05:15), Max: 36.8 (21 Mar 2024 20:06)  T(F): 98 (22 Mar 2024 05:15), Max: 98.3 (21 Mar 2024 20:06)  HR: 73 (22 Mar 2024 05:15) (73 - 118)  BP: 118/77 (22 Mar 2024 05:15) (114/73 - 126/69)  BP(mean): --  RR: 18 (22 Mar 2024 05:15) (18 - 18)  SpO2: 94% (22 Mar 2024 05:15) (91% - 96%)    Parameters below as of 22 Mar 2024 05:15  Patient On (Oxygen Delivery Method): nasal cannula  O2 Flow (L/min): 2      T(C): 36.7 (03-22-24 @ 05:15), Max: 36.8 (03-21-24 @ 04:28)  T(C): 36.7 (03-22-24 @ 05:15), Max: 36.9 (03-19-24 @ 13:57)  T(C): 36.7 (03-22-24 @ 05:15), Max: 37.2 (03-19-24 @ 10:13)    PHYSICAL EXAM:  HEENT: NC atraumatic  Neck: supple  Respiratory: no accessory muscle use, breathing comfortably  Cardiovascular: distant  Gastrointestinal: normal appearing, nondistended  Extremities: no clubbing, no cyanosis, rt heel dressed        LABS:                          12.6   11.25 )-----------( 219      ( 22 Mar 2024 05:48 )             38.3       WBC  11.25 03-22 @ 05:48  7.98 03-20 @ 06:15  8.96 03-19 @ 08:08  10.70 03-18 @ 06:50  19.30 03-17 @ 06:41  3.90 03-16 @ 23:00      03-22    144  |  107  |  17  ----------------------------<  170<H>  3.6   |  33<H>  |  0.99    Ca    8.8      22 Mar 2024 05:48  Mg     1.7     03-22        Creatinine: 0.99 mg/dL (03-22-24 @ 05:48)  Creatinine: 1.10 mg/dL (03-21-24 @ 08:27)  Creatinine: 1.20 mg/dL (03-20-24 @ 06:15)  Creatinine: 1.20 mg/dL (03-19-24 @ 08:08)  Creatinine: 1.40 mg/dL (03-18-24 @ 06:50)  Creatinine: 1.20 mg/dL (03-17-24 @ 06:41)  Creatinine: 1.30 mg/dL (03-16-24 @ 23:00)        Urinalysis Basic - ( 22 Mar 2024 05:48 )    Color: x / Appearance: x / SG: x / pH: x  Gluc: 170 mg/dL / Ketone: x  / Bili: x / Urobili: x   Blood: x / Protein: x / Nitrite: x   Leuk Esterase: x / RBC: x / WBC x   Sq Epi: x / Non Sq Epi: x / Bacteria: x            INFLAMMATORY MARKERS      MICROBIOLOGY:        RADIOLOGY & ADDITIONAL STUDIES:  < from: Xray Foot AP + Lateral + Oblique, Bilat (03.20.24 @ 17:14) >    ACC: [id number] EXAM:  XR FOOT COMP MIN 3 VIEWS BI   ORDERED BY:  AMANDA NULL     PROCEDURE DATE:  03/20/2024          INTERPRETATION:  Clinical data: Pressure ulcer right heel    AP, lateral and oblique view of both feet.    FINDINGS:    Left foot: Status post previous transmetatarsal amputation. Mild   swelling. Bony demineralization. No plain film evidence of osteomyelitis.   No acute fracture, dislocation or radiopaque foreign body    Right foot: No subcutaneous emphysema or plain film evidence of   osteomyelitis. Bony demineralization. No acute fracture, dislocation or   radiopaque foreign body. Small retrocalcaneal spur    IMPRESSION: No evidence of osteomyelitis bilaterally

## 2024-03-22 NOTE — PROGRESS NOTE ADULT - ASSESSMENT
53M with PMH of AF on Eliquis, indwelling Aguilera CAD s/p stents, CVA, DM, HTN, osteomyelitis s/p debridement, PE, sent from [facility] for elevated BP, SOB, and dislodged Aguilera, found to be febrile in ER, a/w sepsis and A fib RVR.     Sepsis, Afib RVR, CAD, HTN  - Known A fib, now rate-controlled.  s/p Dig load and Cardizem gtt  - Would switch Cardizem to long-acting 360 mg daily  - Dig level = 1.5.  Would D/C Dig, not a home med  - Continue Metoprolol 25 mg PO BID with hold parameters, can up titrate if needed  - Continue Eliquis for AC    - BNP 7622.  Still requiring NC at 2L/min.  Wean as tolerated  - Would give Lasix 40 mg IV x 1 today, 3/22, in addition to PO regimen  - TTE (12/2023) normal LV & RV size and function EF 55-60%, indeterminate DD, mod dilated LA and RA, mod MR, mild TR, PASP 31  - Technically difficult ECHO w/ normal LV size and function EF 50-55%, mildly reduced RV systolic function, mod dilated LA and RA, mod to sev MR, mild to mod TR, PASP 36  - Creatinine remains normal.  Hypernatremia resolved    - EKG: A fib RVR @ 154  - Troponin: <-51, no need to trend  - No evidence of any active ischemia   - Continue aspirin and statin     - BP stable and controlled   - Continue to hold home Imdur and losartan to allow room for AV nodals     - Sepsis likely 2/2 UTI  - Management per primary team     - Monitor and replete lytes, keep K>4, Mg>2.  - Will continue to follow.    Henrietta Kim DNP, NP-C, AGACNP-C  Cardiology   Call TEAMS        53M with PMH of AF on Eliquis, indwelling Aguilera CAD s/p stents, CVA, DM, HTN, osteomyelitis s/p debridement, PE, sent from [facility] for elevated BP, SOB, and dislodged Aguilera, found to be febrile in ER, a/w sepsis and A fib RVR.     Sepsis, Afib RVR, CAD, HTN  - Known A fib, now rate-controlled.  s/p Dig load and Cardizem gtt  - Would switch Cardizem to long-acting 360 mg daily  - Dig level = 1.5.  Would D/C Dig, not a home med. Would uptitrate BB instead.   - Continue Metoprolol 25 mg PO BID with hold parameters, can up titrate if needed  - Continue Eliquis for AC    - BNP 7622.  Still requiring NC at 2L/min.  Wean as tolerated  - Would give Lasix 40 mg IV x 1 today, 3/22, in addition to PO regimen  - TTE (12/2023) normal LV & RV size and function EF 55-60%, indeterminate DD, mod dilated LA and RA, mod MR, mild TR, PASP 31  - Technically difficult ECHO w/ normal LV size and function EF 50-55%, mildly reduced RV systolic function, mod dilated LA and RA, mod to sev MR, mild to mod TR, PASP 36  - Creatinine remains normal.  Hypernatremia resolved    - EKG: A fib RVR @ 154  - Troponin: <-51, no need to trend  - No evidence of any active ischemia   - Continue aspirin and statin     - BP stable and controlled   - Continue to hold home Imdur and losartan to allow room for AV nodals     - Sepsis likely 2/2 UTI  - Management per primary team     - Monitor and replete lytes, keep K>4, Mg>2.  - Will continue to follow.    Henrietta Kim DNP, NP-C, AGACNP-C  Cardiology   Call TEAMS

## 2024-03-22 NOTE — PROGRESS NOTE ADULT - ASSESSMENT
53M with PMH of AF on Eliquis, indwelling jacques, CAD s/p stents, CVA, DM, HTN, osteomyelitis s/p debridement, PE, sent from [facility] for elevated BP, SOB, and dislodged jacques    AF  Emphysema  Atelectasis  CAD  CVA  DM  HTN  OP  OA  hx of OM  hx of PE    on abx  fio2 titration  diuresis in progress  vs noted  Vascular and ENDO eval noted - DM foot ulcer  s/p Midline insertion -     ct neg for pe - atelectasis - emphysema  spiriva - nebs prn - VBG  cardio eval in progress  cvs rx regimen  proBNP noted to be elevated  diuresis - as per cardio recs  TTE reviewed  AF management  rate and rhythm control  GERARDO eval as outpatient  monitored unit admission  pulse ox monitoring  goal sat > 88 pct  DM care  serial FS  OLD records reviewed

## 2024-03-22 NOTE — PROGRESS NOTE ADULT - SUBJECTIVE AND OBJECTIVE BOX
Date of Service: 03-22-24 @ 12:52    Patient is a 53y old  Male who presents with a chief complaint of fever (21 Mar 2024 12:09)      INTERVAL HPI/OVERNIGHT EVENTS: Patient seen and examined. NAD. No complaints.    Vital Signs Last 24 Hrs  T(C): 36.7 (22 Mar 2024 11:27), Max: 36.8 (21 Mar 2024 20:06)  T(F): 98 (22 Mar 2024 11:27), Max: 98.3 (21 Mar 2024 20:06)  HR: 84 (22 Mar 2024 11:27) (73 - 118)  BP: 116/78 (22 Mar 2024 11:27) (116/78 - 126/69)  BP(mean): --  RR: 18 (22 Mar 2024 11:27) (18 - 18)  SpO2: 93% (22 Mar 2024 11:27) (93% - 96%)    Parameters below as of 22 Mar 2024 11:27  Patient On (Oxygen Delivery Method): nasal cannula  O2 Flow (L/min): 2      03-22    144  |  107  |  17  ----------------------------<  170<H>  3.6   |  33<H>  |  0.99    Ca    8.8      22 Mar 2024 05:48  Mg     1.7     03-22                            12.6   11.25 )-----------( 219      ( 22 Mar 2024 05:48 )             38.3       CAPILLARY BLOOD GLUCOSE      POCT Blood Glucose.: 213 mg/dL (22 Mar 2024 12:00)  POCT Blood Glucose.: 181 mg/dL (22 Mar 2024 08:09)  POCT Blood Glucose.: 229 mg/dL (21 Mar 2024 21:27)  POCT Blood Glucose.: 195 mg/dL (21 Mar 2024 16:50)    Urinalysis Basic - ( 22 Mar 2024 05:48 )    Color: x / Appearance: x / SG: x / pH: x  Gluc: 170 mg/dL / Ketone: x  / Bili: x / Urobili: x   Blood: x / Protein: x / Nitrite: x   Leuk Esterase: x / RBC: x / WBC x   Sq Epi: x / Non Sq Epi: x / Bacteria: x              acetaminophen     Tablet .. 650 milliGRAM(s) Oral every 6 hours PRN  albuterol    0.083% 2.5 milliGRAM(s) Nebulizer every 4 hours PRN  aluminum hydroxide/magnesium hydroxide/simethicone Suspension 30 milliLiter(s) Oral every 4 hours PRN  apixaban 5 milliGRAM(s) Oral every 12 hours  ascorbic acid 500 milliGRAM(s) Oral daily  aspirin  chewable 81 milliGRAM(s) Oral daily  atorvastatin 40 milliGRAM(s) Oral at bedtime  bisacodyl 10 milliGRAM(s) Oral daily  cholecalciferol 1000 Unit(s) Oral daily  dextrose 5%. 1000 milliLiter(s) IV Continuous <Continuous>  dextrose 5%. 1000 milliLiter(s) IV Continuous <Continuous>  dextrose 50% Injectable 25 Gram(s) IV Push once  dextrose 50% Injectable 12.5 Gram(s) IV Push once  dextrose 50% Injectable 25 Gram(s) IV Push once  dextrose Oral Gel 15 Gram(s) Oral once PRN  digoxin     Tablet 250 MICROGram(s) Oral daily  diltiazem    Tablet 90 milliGRAM(s) Oral every 6 hours  diltiazem    milliGRAM(s) Oral daily  ertapenem  IVPB 1000 milliGRAM(s) IV Intermittent every 24 hours  ferrous    sulfate 325 milliGRAM(s) Oral two times a day  furosemide    Tablet 40 milliGRAM(s) Oral two times a day  gabapentin 300 milliGRAM(s) Oral every 12 hours  glucagon  Injectable 1 milliGRAM(s) IntraMuscular once  insulin glargine Injectable (LANTUS) 15 Unit(s) SubCutaneous every morning  insulin glargine Injectable (LANTUS) 7 Unit(s) SubCutaneous at bedtime  insulin lispro (ADMELOG) corrective regimen sliding scale   SubCutaneous three times a day before meals  insulin lispro (ADMELOG) corrective regimen sliding scale   SubCutaneous at bedtime  insulin lispro Injectable (ADMELOG) 3 Unit(s) SubCutaneous three times a day before meals  loratadine 10 milliGRAM(s) Oral daily  melatonin 5 milliGRAM(s) Oral at bedtime  metoclopramide 5 milliGRAM(s) Oral three times a day  metoprolol tartrate 25 milliGRAM(s) Oral two times a day  multivitamin/minerals 1 Tablet(s) Oral daily  naloxegol 25 milliGRAM(s) Oral daily  ondansetron Injectable 4 milliGRAM(s) IV Push every 6 hours PRN  oxybutynin 5 milliGRAM(s) Oral two times a day  oxyCODONE    IR 10 milliGRAM(s) Oral two times a day  pantoprazole    Tablet 40 milliGRAM(s) Oral before breakfast  polyethylene glycol 3350 17 Gram(s) Oral two times a day  saccharomyces boulardii 250 milliGRAM(s) Oral two times a day  senna 2 Tablet(s) Oral at bedtime  sodium chloride 0.65% Nasal 1 Spray(s) Both Nostrils daily  sucralfate 1 Gram(s) Oral two times a day  tiotropium 2.5 MICROgram(s) Inhaler 2 Puff(s) Inhalation daily              REVIEW OF SYSTEMS:  CONSTITUTIONAL: No fever, no weight loss, or no fatigue  NECK: No pain, no stiffness  RESPIRATORY: No cough, no wheezing, no chills, no hemoptysis, No shortness of breath  CARDIOVASCULAR: No chest pain, no palpitations, no dizziness, no leg swelling  GASTROINTESTINAL: No abdominal pain. No nausea, no vomiting, no hematemesis; No diarrhea, no constipation. No melena, no hematochezia.  GENITOURINARY: No dysuria, no frequency, no hematuria, no incontinence  NEUROLOGICAL: No headaches, no loss of strength, no numbness, no tremors  SKIN: No itching, no burning  MUSCULOSKELETAL: No joint pain, no swelling; No muscle, no back, no extremity pain  PSYCHIATRIC: No depression, no mood swings,   HEME/LYMPH: No easy bruising, no bleeding gums  ALLERY AND IMMUNOLOGIC: No hives       Consultant(s) Notes Reviewed:  [X] YES  [ ] NO    PHYSICAL EXAM:  GENERAL: NAD  HEAD:  Atraumatic, Normocephalic  EYES: EOMI, PERRLA, conjunctiva and sclera clear  ENMT: No tonsillar erythema, exudates, or enlargement; Moist mucous membranes  NECK: Supple, No JVD  NERVOUS SYSTEM:  Awake & alert  CHEST/LUNG: Clear to auscultation bilaterally; No rales, rhonchi, wheezing,  HEART: Regular rate and rhythm  ABDOMEN: Soft, Nontender, Nondistended; Bowel sounds present  EXTREMITIES:  No clubbing, cyanosis, or edema  LYMPH: No lymphadenopathy noted  SKIN: No rashes      Advanced care planning discussed with patient/family [X] YES   [ ] NO    Advanced care planning discussed with patient/family. Patient's health status was discussed. All appropriate changes have been made regarding patient's end-of-life care. Advanced care planning forms reviewed/discussed/completed.  20 minutes spent.

## 2024-03-22 NOTE — PROGRESS NOTE ADULT - ASSESSMENT
52 y/o M PMhx AF on Eliquis, chronic jacques, CAD s/p stents, CVA, DM, HTN, PE who presented w/ SOB, and dislodged jacques as well as dysuria.  also found to have Afib w/ RVR  admitted with  concerns for sepsis, CAUTI, dislodged jacques  reported dysuria  jacques placed in ED  CT abd/pelvis- No hydronephrosis or nephrolithiasis.    Recommendations  1-Bacteremia ESBL E coli (suspected pyelonephritis)   -  Escherichia coli: Detec   -  ESBL: Detec   -  CTX-M Resistance Marker: Detec  Culture - Urine (03.16.24 @ 22:35)  >100,000 CFU/ml Escherichia coli ESBL  Repeat blood cultures collected 3/18 5am - NGTD past 48 hours  ertapenem started 3/17 continue for now with last day 3/26  ordered midline and ertapenem x 10 days can be completed in outpt setting  no labs required and midline can be removed at end of Rx    2-Foot wound open area on pressure location, now dressed, podiatry involved for this unstageable ulcer with rec  Xray-no evidence of osteo  MRI-pending  wound care  -recs to follow based on findings but iof no osteo fine to dc with plan as above for UTI    Thank you for consulting us and involving us in the management of this most interesting and challenging case.  We will follow along in the care of this patient. Please call us at [phone] or text me directly on my cell# at [phone] with any concerns.    Starting tomorrow Dr Anne-Marie Li will be assuming care of this patient so please contact her with any questions, concerns or new micro data.

## 2024-03-22 NOTE — PROGRESS NOTE ADULT - SUBJECTIVE AND OBJECTIVE BOX
CAPILLARY BLOOD GLUCOSE      POCT Blood Glucose.: 229 mg/dL (21 Mar 2024 21:27)  POCT Blood Glucose.: 195 mg/dL (21 Mar 2024 16:50)  POCT Blood Glucose.: 211 mg/dL (21 Mar 2024 12:38)  POCT Blood Glucose.: 202 mg/dL (21 Mar 2024 07:56)      Vital Signs Last 24 Hrs  T(C): 36.7 (22 Mar 2024 05:15), Max: 36.8 (21 Mar 2024 20:06)  T(F): 98 (22 Mar 2024 05:15), Max: 98.3 (21 Mar 2024 20:06)  HR: 73 (22 Mar 2024 05:15) (73 - 118)  BP: 118/77 (22 Mar 2024 05:15) (114/73 - 126/69)  BP(mean): --  RR: 18 (22 Mar 2024 05:15) (18 - 18)  SpO2: 94% (22 Mar 2024 05:15) (91% - 96%)    Parameters below as of 22 Mar 2024 05:15  Patient On (Oxygen Delivery Method): nasal cannula  O2 Flow (L/min): 2      Respiratory: CTA B/L  CV: RRR, S1S2, no murmurs, rubs or gallops  Abdominal: Soft, NT, ND +BS, Last BM  Extremities: No edema, + peripheral pulses     03-21    146<H>  |  107  |  17  ----------------------------<  217<H>  4.1   |  33<H>  |  1.10    Ca    9.3      21 Mar 2024 08:27  Mg     1.6     03-21        atorvastatin 40 milliGRAM(s) Oral at bedtime  dextrose 50% Injectable 25 Gram(s) IV Push once  dextrose 50% Injectable 12.5 Gram(s) IV Push once  dextrose 50% Injectable 25 Gram(s) IV Push once  dextrose Oral Gel 15 Gram(s) Oral once PRN  glucagon  Injectable 1 milliGRAM(s) IntraMuscular once  insulin glargine Injectable (LANTUS) 15 Unit(s) SubCutaneous every morning  insulin glargine Injectable (LANTUS) 7 Unit(s) SubCutaneous at bedtime  insulin lispro (ADMELOG) corrective regimen sliding scale   SubCutaneous three times a day before meals  insulin lispro (ADMELOG) corrective regimen sliding scale   SubCutaneous at bedtime

## 2024-03-23 LAB
CULTURE RESULTS: SIGNIFICANT CHANGE UP
CULTURE RESULTS: SIGNIFICANT CHANGE UP
GLUCOSE BLDC GLUCOMTR-MCNC: 188 MG/DL — HIGH (ref 70–99)
GLUCOSE BLDC GLUCOMTR-MCNC: 211 MG/DL — HIGH (ref 70–99)
GLUCOSE BLDC GLUCOMTR-MCNC: 224 MG/DL — HIGH (ref 70–99)
GLUCOSE BLDC GLUCOMTR-MCNC: 309 MG/DL — HIGH (ref 70–99)
SPECIMEN SOURCE: SIGNIFICANT CHANGE UP
SPECIMEN SOURCE: SIGNIFICANT CHANGE UP

## 2024-03-23 PROCEDURE — 99232 SBSQ HOSP IP/OBS MODERATE 35: CPT

## 2024-03-23 PROCEDURE — 71045 X-RAY EXAM CHEST 1 VIEW: CPT | Mod: 26

## 2024-03-23 RX ORDER — FUROSEMIDE 40 MG
40 TABLET ORAL ONCE
Refills: 0 | Status: COMPLETED | OUTPATIENT
Start: 2024-03-23 | End: 2024-03-23

## 2024-03-23 RX ADMIN — Medication 5 MILLIGRAM(S): at 06:27

## 2024-03-23 RX ADMIN — GABAPENTIN 300 MILLIGRAM(S): 400 CAPSULE ORAL at 17:36

## 2024-03-23 RX ADMIN — Medication 250 MILLIGRAM(S): at 17:37

## 2024-03-23 RX ADMIN — PANTOPRAZOLE SODIUM 40 MILLIGRAM(S): 20 TABLET, DELAYED RELEASE ORAL at 06:27

## 2024-03-23 RX ADMIN — Medication 4: at 17:14

## 2024-03-23 RX ADMIN — NALOXEGOL OXALATE 25 MILLIGRAM(S): 12.5 TABLET, FILM COATED ORAL at 11:29

## 2024-03-23 RX ADMIN — Medication 81 MILLIGRAM(S): at 11:28

## 2024-03-23 RX ADMIN — Medication 250 MILLIGRAM(S): at 06:28

## 2024-03-23 RX ADMIN — Medication 40 MILLIGRAM(S): at 06:27

## 2024-03-23 RX ADMIN — ALBUTEROL 2.5 MILLIGRAM(S): 90 AEROSOL, METERED ORAL at 07:34

## 2024-03-23 RX ADMIN — ATORVASTATIN CALCIUM 40 MILLIGRAM(S): 80 TABLET, FILM COATED ORAL at 21:29

## 2024-03-23 RX ADMIN — Medication 325 MILLIGRAM(S): at 06:28

## 2024-03-23 RX ADMIN — APIXABAN 5 MILLIGRAM(S): 2.5 TABLET, FILM COATED ORAL at 06:28

## 2024-03-23 RX ADMIN — Medication 1 GRAM(S): at 17:40

## 2024-03-23 RX ADMIN — Medication 1 TABLET(S): at 11:29

## 2024-03-23 RX ADMIN — Medication 3 UNIT(S): at 17:15

## 2024-03-23 RX ADMIN — Medication 1000 UNIT(S): at 11:28

## 2024-03-23 RX ADMIN — Medication 500 MILLIGRAM(S): at 11:28

## 2024-03-23 RX ADMIN — OXYCODONE HYDROCHLORIDE 10 MILLIGRAM(S): 5 TABLET ORAL at 06:27

## 2024-03-23 RX ADMIN — TIOTROPIUM BROMIDE 2 PUFF(S): 18 CAPSULE ORAL; RESPIRATORY (INHALATION) at 06:32

## 2024-03-23 RX ADMIN — APIXABAN 5 MILLIGRAM(S): 2.5 TABLET, FILM COATED ORAL at 17:36

## 2024-03-23 RX ADMIN — ERTAPENEM SODIUM 120 MILLIGRAM(S): 1 INJECTION, POWDER, LYOPHILIZED, FOR SOLUTION INTRAMUSCULAR; INTRAVENOUS at 21:31

## 2024-03-23 RX ADMIN — Medication 3 UNIT(S): at 12:02

## 2024-03-23 RX ADMIN — Medication 5 MILLIGRAM(S): at 14:31

## 2024-03-23 RX ADMIN — SENNA PLUS 2 TABLET(S): 8.6 TABLET ORAL at 21:29

## 2024-03-23 RX ADMIN — Medication 1 GRAM(S): at 06:27

## 2024-03-23 RX ADMIN — Medication 1 SPRAY(S): at 11:29

## 2024-03-23 RX ADMIN — Medication 40 MILLIGRAM(S): at 10:10

## 2024-03-23 RX ADMIN — Medication 300 MILLIGRAM(S): at 06:26

## 2024-03-23 RX ADMIN — Medication 5 MILLIGRAM(S): at 21:29

## 2024-03-23 RX ADMIN — Medication 3 UNIT(S): at 07:58

## 2024-03-23 RX ADMIN — OXYCODONE HYDROCHLORIDE 10 MILLIGRAM(S): 5 TABLET ORAL at 18:08

## 2024-03-23 RX ADMIN — OXYCODONE HYDROCHLORIDE 10 MILLIGRAM(S): 5 TABLET ORAL at 17:36

## 2024-03-23 RX ADMIN — LORATADINE 10 MILLIGRAM(S): 10 TABLET ORAL at 11:29

## 2024-03-23 RX ADMIN — Medication 25 MILLIGRAM(S): at 06:28

## 2024-03-23 RX ADMIN — INSULIN GLARGINE 15 UNIT(S): 100 INJECTION, SOLUTION SUBCUTANEOUS at 07:58

## 2024-03-23 RX ADMIN — Medication 40 MILLIGRAM(S): at 14:31

## 2024-03-23 RX ADMIN — Medication 2: at 07:58

## 2024-03-23 RX ADMIN — Medication 10 MILLIGRAM(S): at 11:28

## 2024-03-23 RX ADMIN — Medication 8: at 12:02

## 2024-03-23 RX ADMIN — GABAPENTIN 300 MILLIGRAM(S): 400 CAPSULE ORAL at 06:27

## 2024-03-23 RX ADMIN — Medication 5 MILLIGRAM(S): at 17:36

## 2024-03-23 RX ADMIN — INSULIN GLARGINE 7 UNIT(S): 100 INJECTION, SOLUTION SUBCUTANEOUS at 21:30

## 2024-03-23 NOTE — PROGRESS NOTE ADULT - SUBJECTIVE AND OBJECTIVE BOX
Date/Time Patient Seen:  		  Referring MD:   Data Reviewed	       Patient is a 53y old  Male who presents with a chief complaint of fever (22 Mar 2024 12:51)      Subjective/HPI     PAST MEDICAL & SURGICAL HISTORY:  No pertinent past medical history    Diabetes    No pertinent past medical history    Diabetes mellitus with no complication    Afib    Hypertension    BPH (benign prostatic hyperplasia)    Perforated gastric ulcer  s/p emergent ex-lap omentopexy and plication 6/2019    Pulmonary embolism    History of non-ST elevation myocardial infarction (NSTEMI)    Osteomyelitis  s/p debridement    CAD S/P percutaneous coronary angioplasty    Cerebrovascular accident    No significant past surgical history    No significant past surgical history    H/O abdominal surgery    Perforated gastric ulcer    Traumatic amputation of left foot, initial encounter          Medication list         MEDICATIONS  (STANDING):  apixaban 5 milliGRAM(s) Oral every 12 hours  ascorbic acid 500 milliGRAM(s) Oral daily  aspirin  chewable 81 milliGRAM(s) Oral daily  atorvastatin 40 milliGRAM(s) Oral at bedtime  bisacodyl 10 milliGRAM(s) Oral daily  cholecalciferol 1000 Unit(s) Oral daily  dextrose 5%. 1000 milliLiter(s) (50 mL/Hr) IV Continuous <Continuous>  dextrose 5%. 1000 milliLiter(s) (100 mL/Hr) IV Continuous <Continuous>  dextrose 50% Injectable 25 Gram(s) IV Push once  dextrose 50% Injectable 12.5 Gram(s) IV Push once  dextrose 50% Injectable 25 Gram(s) IV Push once  diltiazem    milliGRAM(s) Oral daily  ertapenem  IVPB 1000 milliGRAM(s) IV Intermittent every 24 hours  ferrous    sulfate 325 milliGRAM(s) Oral two times a day  furosemide    Tablet 40 milliGRAM(s) Oral two times a day  gabapentin 300 milliGRAM(s) Oral every 12 hours  glucagon  Injectable 1 milliGRAM(s) IntraMuscular once  insulin glargine Injectable (LANTUS) 15 Unit(s) SubCutaneous every morning  insulin glargine Injectable (LANTUS) 7 Unit(s) SubCutaneous at bedtime  insulin lispro (ADMELOG) corrective regimen sliding scale   SubCutaneous three times a day before meals  insulin lispro (ADMELOG) corrective regimen sliding scale   SubCutaneous at bedtime  insulin lispro Injectable (ADMELOG) 3 Unit(s) SubCutaneous three times a day before meals  loratadine 10 milliGRAM(s) Oral daily  melatonin 5 milliGRAM(s) Oral at bedtime  metoclopramide 5 milliGRAM(s) Oral three times a day  metoprolol tartrate 25 milliGRAM(s) Oral two times a day  multivitamin/minerals 1 Tablet(s) Oral daily  naloxegol 25 milliGRAM(s) Oral daily  oxybutynin 5 milliGRAM(s) Oral two times a day  oxyCODONE    IR 10 milliGRAM(s) Oral two times a day  pantoprazole    Tablet 40 milliGRAM(s) Oral before breakfast  polyethylene glycol 3350 17 Gram(s) Oral two times a day  saccharomyces boulardii 250 milliGRAM(s) Oral two times a day  senna 2 Tablet(s) Oral at bedtime  sodium chloride 0.65% Nasal 1 Spray(s) Both Nostrils daily  sucralfate 1 Gram(s) Oral two times a day  tiotropium 2.5 MICROgram(s) Inhaler 2 Puff(s) Inhalation daily    MEDICATIONS  (PRN):  acetaminophen     Tablet .. 650 milliGRAM(s) Oral every 6 hours PRN Temp greater or equal to 38C (100.4F), Mild Pain (1 - 3)  albuterol    0.083% 2.5 milliGRAM(s) Nebulizer every 4 hours PRN Shortness of Breath and/or Wheezing  aluminum hydroxide/magnesium hydroxide/simethicone Suspension 30 milliLiter(s) Oral every 4 hours PRN Dyspepsia  dextrose Oral Gel 15 Gram(s) Oral once PRN Blood Glucose LESS THAN 70 milliGRAM(s)/deciliter  ondansetron Injectable 4 milliGRAM(s) IV Push every 6 hours PRN Nausea and/or Vomiting         Vitals log        ICU Vital Signs Last 24 Hrs  T(C): 36.8 (23 Mar 2024 04:54), Max: 37.3 (22 Mar 2024 17:45)  T(F): 98.2 (23 Mar 2024 04:54), Max: 99.1 (22 Mar 2024 17:45)  HR: 85 (23 Mar 2024 04:54) (64 - 85)  BP: 99/61 (23 Mar 2024 04:54) (99/61 - 119/73)  BP(mean): --  ABP: --  ABP(mean): --  RR: 18 (23 Mar 2024 04:54) (18 - 18)  SpO2: 92% (23 Mar 2024 04:54) (92% - 95%)    O2 Parameters below as of 23 Mar 2024 04:54  Patient On (Oxygen Delivery Method): nasal cannula                 Input and Output:  I&O's Detail    21 Mar 2024 07:01  -  22 Mar 2024 07:00  --------------------------------------------------------  IN:    Oral Fluid: 850 mL  Total IN: 850 mL    OUT:    Voided (mL): 850 mL  Total OUT: 850 mL    Total NET: 0 mL      22 Mar 2024 07:01  -  23 Mar 2024 05:50  --------------------------------------------------------  IN:  Total IN: 0 mL    OUT:    Indwelling Catheter - Urethral (mL): 1500 mL  Total OUT: 1500 mL    Total NET: -1500 mL          Lab Data                        12.6   11.25 )-----------( 219      ( 22 Mar 2024 05:48 )             38.3     03-22    144  |  107  |  17  ----------------------------<  170<H>  3.6   |  33<H>  |  0.99    Ca    8.8      22 Mar 2024 05:48  Mg     1.7     03-22              Review of Systems	      Objective     Physical Examination    heart s1s2  lung dc BS  head nc      Pertinent Lab findings & Imaging      Ale:  NO   Adequate UO     I&O's Detail    21 Mar 2024 07:01  -  22 Mar 2024 07:00  --------------------------------------------------------  IN:    Oral Fluid: 850 mL  Total IN: 850 mL    OUT:    Voided (mL): 850 mL  Total OUT: 850 mL    Total NET: 0 mL      22 Mar 2024 07:01  -  23 Mar 2024 05:50  --------------------------------------------------------  IN:  Total IN: 0 mL    OUT:    Indwelling Catheter - Urethral (mL): 1500 mL  Total OUT: 1500 mL    Total NET: -1500 mL               Discussed with:     Cultures:	        Radiology

## 2024-03-23 NOTE — PROGRESS NOTE ADULT - SUBJECTIVE AND OBJECTIVE BOX
Date of Service: 03-23-24 @ 10:38    Patient is a 53y old  Male who presents with a chief complaint of fever (22 Mar 2024 12:51)      INTERVAL HPI/OVERNIGHT EVENTS: Patient seen and examined. NAD. No complaints.    Vital Signs Last 24 Hrs  T(C): 36.8 (23 Mar 2024 04:54), Max: 37.3 (22 Mar 2024 17:45)  T(F): 98.2 (23 Mar 2024 04:54), Max: 99.1 (22 Mar 2024 17:45)  HR: 83 (23 Mar 2024 07:52) (64 - 85)  BP: 114/76 (23 Mar 2024 10:12) (99/61 - 119/73)  BP(mean): --  RR: 18 (23 Mar 2024 04:54) (18 - 18)  SpO2: 92% (23 Mar 2024 07:52) (92% - 95%)    Parameters below as of 23 Mar 2024 08:02  Patient On (Oxygen Delivery Method): nasal cannula        03-22    144  |  107  |  17  ----------------------------<  170<H>  3.6   |  33<H>  |  0.99    Ca    8.8      22 Mar 2024 05:48  Mg     1.7     03-22                            12.6   11.25 )-----------( 219      ( 22 Mar 2024 05:48 )             38.3       CAPILLARY BLOOD GLUCOSE      POCT Blood Glucose.: 188 mg/dL (23 Mar 2024 07:47)  POCT Blood Glucose.: 227 mg/dL (22 Mar 2024 21:09)  POCT Blood Glucose.: 182 mg/dL (22 Mar 2024 16:59)  POCT Blood Glucose.: 213 mg/dL (22 Mar 2024 12:00)    Urinalysis Basic - ( 22 Mar 2024 05:48 )    Color: x / Appearance: x / SG: x / pH: x  Gluc: 170 mg/dL / Ketone: x  / Bili: x / Urobili: x   Blood: x / Protein: x / Nitrite: x   Leuk Esterase: x / RBC: x / WBC x   Sq Epi: x / Non Sq Epi: x / Bacteria: x              acetaminophen     Tablet .. 650 milliGRAM(s) Oral every 6 hours PRN  albuterol    0.083% 2.5 milliGRAM(s) Nebulizer every 4 hours PRN  aluminum hydroxide/magnesium hydroxide/simethicone Suspension 30 milliLiter(s) Oral every 4 hours PRN  apixaban 5 milliGRAM(s) Oral every 12 hours  ascorbic acid 500 milliGRAM(s) Oral daily  aspirin  chewable 81 milliGRAM(s) Oral daily  atorvastatin 40 milliGRAM(s) Oral at bedtime  bisacodyl 10 milliGRAM(s) Oral daily  cholecalciferol 1000 Unit(s) Oral daily  dextrose 5%. 1000 milliLiter(s) IV Continuous <Continuous>  dextrose 5%. 1000 milliLiter(s) IV Continuous <Continuous>  dextrose 50% Injectable 25 Gram(s) IV Push once  dextrose 50% Injectable 12.5 Gram(s) IV Push once  dextrose 50% Injectable 25 Gram(s) IV Push once  dextrose Oral Gel 15 Gram(s) Oral once PRN  diltiazem    milliGRAM(s) Oral daily  ertapenem  IVPB 1000 milliGRAM(s) IV Intermittent every 24 hours  ferrous    sulfate 325 milliGRAM(s) Oral two times a day  furosemide    Tablet 40 milliGRAM(s) Oral two times a day  gabapentin 300 milliGRAM(s) Oral every 12 hours  glucagon  Injectable 1 milliGRAM(s) IntraMuscular once  insulin glargine Injectable (LANTUS) 15 Unit(s) SubCutaneous every morning  insulin glargine Injectable (LANTUS) 7 Unit(s) SubCutaneous at bedtime  insulin lispro (ADMELOG) corrective regimen sliding scale   SubCutaneous three times a day before meals  insulin lispro (ADMELOG) corrective regimen sliding scale   SubCutaneous at bedtime  insulin lispro Injectable (ADMELOG) 3 Unit(s) SubCutaneous three times a day before meals  loratadine 10 milliGRAM(s) Oral daily  melatonin 5 milliGRAM(s) Oral at bedtime  metoclopramide 5 milliGRAM(s) Oral three times a day  multivitamin/minerals 1 Tablet(s) Oral daily  naloxegol 25 milliGRAM(s) Oral daily  ondansetron Injectable 4 milliGRAM(s) IV Push every 6 hours PRN  oxybutynin 5 milliGRAM(s) Oral two times a day  oxyCODONE    IR 10 milliGRAM(s) Oral two times a day  pantoprazole    Tablet 40 milliGRAM(s) Oral before breakfast  polyethylene glycol 3350 17 Gram(s) Oral two times a day  saccharomyces boulardii 250 milliGRAM(s) Oral two times a day  senna 2 Tablet(s) Oral at bedtime  sodium chloride 0.65% Nasal 1 Spray(s) Both Nostrils daily  sucralfate 1 Gram(s) Oral two times a day  tiotropium 2.5 MICROgram(s) Inhaler 2 Puff(s) Inhalation daily              REVIEW OF SYSTEMS:  CONSTITUTIONAL: No fever, no weight loss, or no fatigue  NECK: No pain, no stiffness  RESPIRATORY: No cough, no wheezing, no chills, no hemoptysis, No shortness of breath  CARDIOVASCULAR: No chest pain, no palpitations, no dizziness, no leg swelling  GASTROINTESTINAL: No abdominal pain. No nausea, no vomiting, no hematemesis; No diarrhea, no constipation. No melena, no hematochezia.  GENITOURINARY: No dysuria, no frequency, no hematuria, no incontinence  NEUROLOGICAL: No headaches, no loss of strength, no numbness, no tremors  SKIN: No itching, no burning  MUSCULOSKELETAL: No joint pain, no swelling; No muscle, no back, no extremity pain  PSYCHIATRIC: No depression, no mood swings,   HEME/LYMPH: No easy bruising, no bleeding gums  ALLERY AND IMMUNOLOGIC: No hives       Consultant(s) Notes Reviewed:  [X] YES  [ ] NO    PHYSICAL EXAM:  GENERAL: NAD  HEAD:  Atraumatic, Normocephalic  EYES: EOMI, PERRLA, conjunctiva and sclera clear  ENMT: No tonsillar erythema, exudates, or enlargement; Moist mucous membranes  NECK: Supple, No JVD  NERVOUS SYSTEM:  Awake & alert  CHEST/LUNG: Clear to auscultation bilaterally; No rales, rhonchi, wheezing,  HEART: Regular rate and rhythm  ABDOMEN: Soft, Nontender, Nondistended; Bowel sounds present  EXTREMITIES:  No clubbing, cyanosis, or edema  LYMPH: No lymphadenopathy noted  SKIN: No rashes      Advanced care planning discussed with patient/family [X] YES   [ ] NO    Advanced care planning discussed with patient/family. Patient's health status was discussed. All appropriate changes have been made regarding patient's end-of-life care. Advanced care planning forms reviewed/discussed/completed.  20 minutes spent.

## 2024-03-23 NOTE — PROGRESS NOTE ADULT - ASSESSMENT
53M with PMH of AF on Eliquis, indwelling jacques, CAD s/p stents, CVA, DM, HTN, osteomyelitis s/p debridement, PE, sent from [facility] for elevated BP, SOB, and dislodged jacques    AF  Emphysema  Atelectasis  CAD  CVA  DM  HTN  OP  OA  hx of OM  hx of PE    cvs rx regimen optimization and cardio f/u  Vascular and ENDO eval noted - DM foot ulcer  s/p Midline insertion - ABX    ct neg for pe - atelectasis - emphysema  spiriva - nebs prn - VBG  cardio eval in progress  cvs rx regimen  proBNP noted to be elevated  diuresis - as per cardio recs  TTE reviewed  AF management  rate and rhythm control  GERARDO eval as outpatient  monitored unit admission  pulse ox monitoring  goal sat > 88 pct  DM care  serial FS  OLD records reviewed

## 2024-03-23 NOTE — PROGRESS NOTE ADULT - PROBLEM SELECTOR PLAN 3
Continue iv abx and wound care  Unable to have MRI b/c of brain aneurysm clips  Will have R heel debridement and bone biopsy on 3/26  Podiatry f/u

## 2024-03-23 NOTE — PROGRESS NOTE ADULT - ASSESSMENT
53M with PMH of AF on Eliquis, indwelling Aguilera CAD s/p stents, CVA, DM, HTN, osteomyelitis s/p debridement, PE, sent from [facility] for elevated BP, SOB, and dislodged Aguilera, found to be febrile in ER, a/w sepsis and A fib RVR.     Sepsis, Afib RVR, CAD, HTN  - Known A fib, now rate-controlled.  s/p Dig load and Cardizem gtt  - Dig level = 1.5.  Would D/C Dig, not a home med.   - Had a 3.4 sec pause, 3/22.  D/C Lopressor 25 mg q12H  - Continue Cardizem CD at reduced dose of 300 mg daily  - Continue Eliquis for AC    - BNP 7622.  Still requiring NC at 2L/min.  Wean as tolerated.  Repeat CxR today  - Give Lasix 40 mg IV x 1 again in addition to PO.  If CxR + CHF, would switch PO Lasix to IV  - TTE (12/2023) normal LV & RV size and function EF 55-60%, indeterminate DD, mod dilated LA and RA, mod MR, mild TR, PASP 31  - Technically difficult ECHO w/ normal LV size and function EF 50-55%, mildly reduced RV systolic function, mod dilated LA and RA, mod to sev MR, mild to mod TR, PASP 36  - Creatinine remains normal.  Hypernatremia resolved    - EKG: A fib RVR @ 154  - Troponin: <-51, no need to trend  - No evidence of any active ischemia   - Continue aspirin and statin     - BP stable and controlled, though, with one soft BP this morning at systolic 99  - Continue to hold home Imdur and Losartan to allow room for AV nodals     - Sepsis likely 2/2 UTI  - Management per primary team     - Monitor and replete lytes, keep K>4, Mg>2.  - Will continue to follow.    Henrietta Kim DNP, NP-C, AGACNP-C  Cardiology   Call TEAMS     53M with PMH of AF on Eliquis, indwelling Aguilera CAD s/p stents, CVA, DM, HTN, osteomyelitis s/p debridement, PE, sent from [facility] for elevated BP, SOB, and dislodged Aguilera, found to be febrile in ER, a/w sepsis and A fib RVR.     Sepsis, Afib RVR, CAD, HTN  - Known A fib, now rate-controlled.  s/p Dig load and Cardizem gtt  - Dig level = 1.5.  Would D/C Dig, not a home med.   - Had a 3.4 sec pause, 3/22.  D/C Lopressor 25 mg q12H  - Continue Cardizem CD at reduced dose of 300 mg daily  - Continue Eliquis for AC    - BNP 7622.  Still requiring NC at 2L/min.  Wean as tolerated.  Repeat CxR today  - Give Lasix 40 mg IV x 1 again in addition to PO.  If CxR + CHF, would switch PO Lasix to IV  - TTE (12/2023) normal LV & RV size and function EF 55-60%, indeterminate DD, mod dilated LA and RA, mod MR, mild TR, PASP 31  - Technically difficult ECHO w/ normal LV size and function EF 50-55%, mildly reduced RV systolic function, mod dilated LA and RA, mod to sev MR, mild to mod TR, PASP 36  - Creatinine remains normal.  Hypernatremia resolved    - EKG: A fib RVR @ 154  - Troponin: <-51, no need to trend  - No evidence of any active ischemia   - Continue aspirin and statin     - BP stable and controlled, though, with one soft BP this morning at systolic 99  - Continue to hold home Imdur and Losartan to allow room for AV nodals     - Sepsis likely 2/2 UTI  - Management per primary team     - Monitor and replete lytes, keep K>4, Mg>2.  - Will continue to follow.    Henrietta Kim DNP, NP-C, AGACNP-C  Cardiology   Call TEAMS

## 2024-03-23 NOTE — PROGRESS NOTE ADULT - SUBJECTIVE AND OBJECTIVE BOX
[facility] -- Brittany Lutz, Reynaldo Ku Savella, , Gera Maya  Office # [phone]    Follow Up:      Subjective/Observations:     REVIEW OF SYSTEMS: All other review of systems is negative unless indicated above  PAST MEDICAL & SURGICAL HISTORY:  Diabetes      Diabetes mellitus with no complication      Afib      Hypertension      BPH (benign prostatic hyperplasia)      Perforated gastric ulcer  s/p emergent ex-lap omentopexy and plication 6/2019      Pulmonary embolism      History of non-ST elevation myocardial infarction (NSTEMI)      Osteomyelitis  s/p debridement      CAD S/P percutaneous coronary angioplasty      Cerebrovascular accident      H/O abdominal surgery      Perforated gastric ulcer      Traumatic amputation of left foot, initial encounter        MEDICATIONS  (STANDING):  apixaban 5 milliGRAM(s) Oral every 12 hours  ascorbic acid 500 milliGRAM(s) Oral daily  aspirin  chewable 81 milliGRAM(s) Oral daily  atorvastatin 40 milliGRAM(s) Oral at bedtime  bisacodyl 10 milliGRAM(s) Oral daily  cholecalciferol 1000 Unit(s) Oral daily  dextrose 5%. 1000 milliLiter(s) (100 mL/Hr) IV Continuous <Continuous>  dextrose 5%. 1000 milliLiter(s) (50 mL/Hr) IV Continuous <Continuous>  dextrose 50% Injectable 25 Gram(s) IV Push once  dextrose 50% Injectable 12.5 Gram(s) IV Push once  dextrose 50% Injectable 25 Gram(s) IV Push once  diltiazem    milliGRAM(s) Oral daily  ertapenem  IVPB 1000 milliGRAM(s) IV Intermittent every 24 hours  ferrous    sulfate 325 milliGRAM(s) Oral two times a day  furosemide    Tablet 40 milliGRAM(s) Oral two times a day  gabapentin 300 milliGRAM(s) Oral every 12 hours  glucagon  Injectable 1 milliGRAM(s) IntraMuscular once  insulin glargine Injectable (LANTUS) 15 Unit(s) SubCutaneous every morning  insulin glargine Injectable (LANTUS) 7 Unit(s) SubCutaneous at bedtime  insulin lispro (ADMELOG) corrective regimen sliding scale   SubCutaneous three times a day before meals  insulin lispro (ADMELOG) corrective regimen sliding scale   SubCutaneous at bedtime  insulin lispro Injectable (ADMELOG) 3 Unit(s) SubCutaneous three times a day before meals  loratadine 10 milliGRAM(s) Oral daily  melatonin 5 milliGRAM(s) Oral at bedtime  metoclopramide 5 milliGRAM(s) Oral three times a day  metoprolol tartrate 25 milliGRAM(s) Oral two times a day  multivitamin/minerals 1 Tablet(s) Oral daily  naloxegol 25 milliGRAM(s) Oral daily  oxybutynin 5 milliGRAM(s) Oral two times a day  oxyCODONE    IR 10 milliGRAM(s) Oral two times a day  pantoprazole    Tablet 40 milliGRAM(s) Oral before breakfast  polyethylene glycol 3350 17 Gram(s) Oral two times a day  saccharomyces boulardii 250 milliGRAM(s) Oral two times a day  senna 2 Tablet(s) Oral at bedtime  sodium chloride 0.65% Nasal 1 Spray(s) Both Nostrils daily  sucralfate 1 Gram(s) Oral two times a day  tiotropium 2.5 MICROgram(s) Inhaler 2 Puff(s) Inhalation daily    MEDICATIONS  (PRN):  acetaminophen     Tablet .. 650 milliGRAM(s) Oral every 6 hours PRN Temp greater or equal to 38C (100.4F), Mild Pain (1 - 3)  albuterol    0.083% 2.5 milliGRAM(s) Nebulizer every 4 hours PRN Shortness of Breath and/or Wheezing  aluminum hydroxide/magnesium hydroxide/simethicone Suspension 30 milliLiter(s) Oral every 4 hours PRN Dyspepsia  dextrose Oral Gel 15 Gram(s) Oral once PRN Blood Glucose LESS THAN 70 milliGRAM(s)/deciliter  ondansetron Injectable 4 milliGRAM(s) IV Push every 6 hours PRN Nausea and/or Vomiting    Allergies    No Known Drug Allergies  fish (Hives)    Intolerances      Vital Signs Last 24 Hrs  T(C): 36.8 (23 Mar 2024 04:54), Max: 37.3 (22 Mar 2024 17:45)  T(F): 98.2 (23 Mar 2024 04:54), Max: 99.1 (22 Mar 2024 17:45)  HR: 85 (23 Mar 2024 04:54) (64 - 85)  BP: 99/61 (23 Mar 2024 04:54) (99/61 - 119/73)  BP(mean): --  RR: 18 (23 Mar 2024 04:54) (18 - 18)  SpO2: 92% (23 Mar 2024 04:54) (92% - 95%)    Parameters below as of 23 Mar 2024 04:54  Patient On (Oxygen Delivery Method): nasal cannula      I&O's Summary    21 Mar 2024 07:01  -  22 Mar 2024 07:00  --------------------------------------------------------  IN: 850 mL / OUT: 850 mL / NET: 0 mL    22 Mar 2024 07:01  -  23 Mar 2024 06:12  --------------------------------------------------------  IN: 0 mL / OUT: 1500 mL / NET: -1500 mL        PHYSICAL EXAM:  TELE:   Constitutional: NAD, awake and alert, well-developed  HEENT: Moist Mucous Membranes, Anicteric  Pulmonary: Non-labored, breath sounds are clear bilaterally, No wheezing, rales or rhonchi  Cardiovascular: Regular, S1 and S2, No murmurs, rubs, gallops or clicks  Gastrointestinal: Bowel Sounds present, soft, nontender.   Lymph: No peripheral edema. No lymphadenopathy.  Skin: No visible rashes or ulcers.  Psych:  Mood & affect appropriate  LABS: All Labs Reviewed:                        12.6   11.25 )-----------( 219      ( 22 Mar 2024 05:48 )             38.3                         12.1   7.98  )-----------( 194      ( 20 Mar 2024 06:15 )             37.9     22 Mar 2024 05:48    144    |  107    |  17     ----------------------------<  170    3.6     |  33     |  0.99   21 Mar 2024 08:27    146    |  107    |  17     ----------------------------<  217    4.1     |  33     |  1.10   20 Mar 2024 06:15    146    |  107    |  17     ----------------------------<  188    3.3     |  32     |  1.20     Ca    8.8        22 Mar 2024 05:48  Ca    9.3        21 Mar 2024 08:27  Ca    9.1        20 Mar 2024 06:15  Mg     1.7       22 Mar 2024 05:48  Mg     1.6       21 Mar 2024 08:27  Mg     1.7       20 Mar 2024 06:15            12 Lead ECG:   Ventricular Rate 154 BPM    QRS Duration 76 ms    Q-T Interval 282 ms    QTC Calculation(Bazett) 451 ms    R Axis 15 degrees    T Axis 194 degrees    Diagnosis Line *** Critical Test Result: High HR  Atrial fibrillation with rapid ventricular response  Low voltage QRS  Nonspecific ST and T wave abnormality    Confirmed by JOHN KU ([id number]) on 3/17/2024 7:30:13 AM (03-16-24 @ 23:50)      TRANSTHORACIC ECHOCARDIOGRAM REPORT  ________________________________________________________________________________                                      _______       Pt. Name:       SARAH MORRISON Study Date:    3/18/2024  MRN:            [id number]         YOB: 1970  Accession #:    [id number]        Age:           53 years  Account#:       [id number]       Gender:        M  Heart Rate:                      Height:        74.02 in (188.00 cm)  Rhythm:                          Weight:        244.71 lb (111.00 kg)  Blood Pressure: 100/60 mmHg      BSA/BMI:       2.37 m² / 31.41 kg/m²  ________________________________________________________________________________________  Referring Physician:    [id number] Hill Brizuela  Interpreting Physician: Mary Maya MD  Primary Sonographer:    Mounika FELDMAN    CPT:               ECHO TTE WO CON COMP W DOPP - 17046.m  Indication(s):     Dyspnea, unspecified - R06.00  Procedure:         Transthoracic echocardiogram with 2-D, M-mode and complete                     spectral and color flow Doppler.  Ordering Location: [facility]  Admission Status:  Inpatient  Study Information: Image quality for this study is technically difficult.    _______________________________________________________________________________________     CONCLUSIONS:      1. Technically difficult image quality.   2. Left ventricular cavity is normal in size. Left ventricular wall thickness is normal. Left ventricular systolic function is normal with an ejection fraction visually estimated at 50 to 55 %.   3. Normal right ventricular cavity size, with normal wall thickness, and mildly reduced systolic function.   4. The left atrium is moderately dilated.   5. The right atrium is moderately dilated.   6. Moderate to severe mitral regurgitation.   7. Mild to moderate tricuspid regurgitation.   8. Estimated pulmonary artery systolic pressure is 36 mmHg, consistent with mild pulmonary hypertension.   9. The inferior vena cava is dilated measuring 2.40 cm in diameter, (dilated >2.1cm) with normal inspiratory collapse (normal >50%) consistent with mildly elevated right atrial pressure (~8, range 5-10mmHg).  10. Trace pericardial effusion.    ________________________________________________________________________________________  FINDINGS:     Left Ventricle:  The left ventricular cavity is normal in size. Left ventricular wall thickness is normal. Left ventricular systolic function is normal with an ejection fraction visually estimated at 50 to 55%.The left ventricular diastolic function is indeterminate.     Right Ventricle:  The right ventricular cavity is normal in size, with normal wall thickness and mildly reduced systolic function.     Left Atrium:  The left atrium is moderately dilated.     Right Atrium:  The right atrium is moderately dilated.     Aortic Valve:  The aortic valve anatomy cannot be determined with normal systolic excursion. There is no aortic valve stenosis.     Mitral Valve:  There is mild calcification of the mitralvalve annulus. There is moderate to severe mitral regurgitation.     Tricuspid Valve:  Structurally normal tricuspid valve with normal leaflet excursion. There is mild to moderate tricuspid regurgitation. Estimated pulmonary artery systolic pressure is 36 mmHg, consistent with mild pulmonary hypertension.     Pulmonic Valve:  The pulmonic valve was not well visualized. There is trace pulmonic regurgitation.     Pericardium:  There is a trace pericardial effusion.     Systemic Veins:  The inferiorvena cava is dilated measuring 2.40 cm in diameter, (dilated >2.1cm) with normal inspiratory collapse (normal >50%) consistent with mildly elevated right atrial pressure (~8, range 5-10mmHg).  ____________________________________________________________________  QUANTITATIVE DATA:  Left Ventricle Measurements: (Indexed to BSA)     IVSd (2D):   1.9 cm  LVPWd (2D):  1.5 cm  LVIDd (2D):  5.3 cm  LVIDs (2D):  3.9 cm  LV Mass:     413 g  174.4 g/m²  Visualized LV EF%: 50 to 55%     MV E Vmax:    1.15 m/s  e' lateral:   10.90 cm/s  e' medial:    10.50 cm/s  E/e' lateral: 10.56  E/e' medial:  12.00  E/e' Average: 10.76  MV DT:        137 msec    Aorta Measurements: (Normal range) (Indexed to BSA)     Sinuses of Valsalva: 3.50 cm (3.1 - 3.7 cm)       Left Atrium Measurements: (Indexed to BSA)  LA Diam 2D: 3.99 cm       LVOT / RVOT/ Qp/Qs Data: (Indexed to BSA)  LVOT Diameter: 2.22 cm    Mitral Valve Measurements:     MV E Vmax: 1.2 m/s       Tricuspid Valve Measurements:     TR Vmax:          2.6m/s  TR Peak Gradient: 27.7 mmHg  RA Pressure:      8 mmHg  PASP:             36 mmHg    ________________________________________________________________________________________  Electronically signed on 3/19/2024 at 11:27:59 AM by Mary Maya MD     *** Final ***      [facility] -- Brittany Lutz, Reynaldo Ku Savella, , Gera Maay  Office # [phone]    Follow Up: Afib with RVR     Subjective/Observations: Denies SOB overnight but admits to dry cough.  Remains on NC at 2L/min.  Denies CP or palpitations.  No overnight tele events    REVIEW OF SYSTEMS: All other review of systems is negative unless indicated above  PAST MEDICAL & SURGICAL HISTORY:  Diabetes  Diabetes mellitus with no complication  Afib  Hypertension  BPH (benign prostatic hyperplasia)  Perforated gastric ulcer  s/p emergent ex-lap omentopexy and plication 6/2019  Pulmonary embolism  History of non-ST elevation myocardial infarction (NSTEMI)  Osteomyelitis  s/p debridement  CAD S/P percutaneous coronary angioplasty  Cerebrovascular accident  H/O abdominal surgery  Perforated gastric ulcer  Traumatic amputation of left foot, initial encounter    MEDICATIONS  (STANDING):  apixaban 5 milliGRAM(s) Oral every 12 hours  ascorbic acid 500 milliGRAM(s) Oral daily  aspirin  chewable 81 milliGRAM(s) Oral daily  atorvastatin 40 milliGRAM(s) Oral at bedtime  bisacodyl 10 milliGRAM(s) Oral daily  cholecalciferol 1000 Unit(s) Oral daily  dextrose 5%. 1000 milliLiter(s) (100 mL/Hr) IV Continuous <Continuous>  dextrose 5%. 1000 milliLiter(s) (50 mL/Hr) IV Continuous <Continuous>  dextrose 50% Injectable 25 Gram(s) IV Push once  dextrose 50% Injectable 12.5 Gram(s) IV Push once  dextrose 50% Injectable 25 Gram(s) IV Push once  diltiazem    milliGRAM(s) Oral daily  ertapenem  IVPB 1000 milliGRAM(s) IV Intermittent every 24 hours  ferrous    sulfate 325 milliGRAM(s) Oral two times a day  furosemide    Tablet 40 milliGRAM(s) Oral two times a day  gabapentin 300 milliGRAM(s) Oral every 12 hours  glucagon  Injectable 1 milliGRAM(s) IntraMuscular once  insulin glargine Injectable (LANTUS) 15 Unit(s) SubCutaneous every morning  insulin glargine Injectable (LANTUS) 7 Unit(s) SubCutaneous at bedtime  insulin lispro (ADMELOG) corrective regimen sliding scale   SubCutaneous three times a day before meals  insulin lispro (ADMELOG) corrective regimen sliding scale   SubCutaneous at bedtime  insulin lispro Injectable (ADMELOG) 3 Unit(s) SubCutaneous three times a day before meals  loratadine 10 milliGRAM(s) Oral daily  melatonin 5 milliGRAM(s) Oral at bedtime  metoclopramide 5 milliGRAM(s) Oral three times a day  metoprolol tartrate 25 milliGRAM(s) Oral two times a day  multivitamin/minerals 1 Tablet(s) Oral daily  naloxegol 25 milliGRAM(s) Oral daily  oxybutynin 5 milliGRAM(s) Oral two times a day  oxyCODONE    IR 10 milliGRAM(s) Oral two times a day  pantoprazole    Tablet 40 milliGRAM(s) Oral before breakfast  polyethylene glycol 3350 17 Gram(s) Oral two times a day  saccharomyces boulardii 250 milliGRAM(s) Oral two times a day  senna 2 Tablet(s) Oral at bedtime  sodium chloride 0.65% Nasal 1 Spray(s) Both Nostrils daily  sucralfate 1 Gram(s) Oral two times a day  tiotropium 2.5 MICROgram(s) Inhaler 2 Puff(s) Inhalation daily    MEDICATIONS  (PRN):  acetaminophen     Tablet .. 650 milliGRAM(s) Oral every 6 hours PRN Temp greater or equal to 38C (100.4F), Mild Pain (1 - 3)  albuterol    0.083% 2.5 milliGRAM(s) Nebulizer every 4 hours PRN Shortness of Breath and/or Wheezing  aluminum hydroxide/magnesium hydroxide/simethicone Suspension 30 milliLiter(s) Oral every 4 hours PRN Dyspepsia  dextrose Oral Gel 15 Gram(s) Oral once PRN Blood Glucose LESS THAN 70 milliGRAM(s)/deciliter  ondansetron Injectable 4 milliGRAM(s) IV Push every 6 hours PRN Nausea and/or Vomiting    Allergies    No Known Drug Allergies  fish (Hives)    Intolerances    Vital Signs Last 24 Hrs  T(C): 36.8 (23 Mar 2024 04:54), Max: 37.3 (22 Mar 2024 17:45)  T(F): 98.2 (23 Mar 2024 04:54), Max: 99.1 (22 Mar 2024 17:45)  HR: 85 (23 Mar 2024 04:54) (64 - 85)  BP: 99/61 (23 Mar 2024 04:54) (99/61 - 119/73)  BP(mean): --  RR: 18 (23 Mar 2024 04:54) (18 - 18)  SpO2: 92% (23 Mar 2024 04:54) (92% - 95%)    Parameters below as of 23 Mar 2024 04:54  Patient On (Oxygen Delivery Method): nasal cannula    I&O's Summary    21 Mar 2024 07:01  -  22 Mar 2024 07:00  --------------------------------------------------------  IN: 850 mL / OUT: 850 mL / NET: 0 mL    22 Mar 2024 07:01  -  23 Mar 2024 06:12  --------------------------------------------------------  IN: 0 mL / OUT: 1500 mL / NET: -1500 mL    PHYSICAL EXAM:  TELE: Afib  Constitutional: NAD, awake and alert  HEENT: Moist Mucous Membranes, Anicteric  Pulmonary: Non-labored, breath sounds are diminished bilaterally, No wheezing, +rales no rhonchi  Cardiovascular: IRRR, S1 and S2, +murmurs, rubs, gallops or clicks  Gastrointestinal: Bowel Sounds present, soft, nontender.   Lymph: No peripheral edema. No lymphadenopathy.  Skin: No visible rashes.  Right heel ulcer with dressing dry and intact  Psych:  Mood & affect appropriate    LABS: All Labs Reviewed:                        12.6   11.25 )-----------( 219      ( 22 Mar 2024 05:48 )             38.3                         12.1   7.98  )-----------( 194      ( 20 Mar 2024 06:15 )             37.9     22 Mar 2024 05:48    144    |  107    |  17     ----------------------------<  170    3.6     |  33     |  0.99   21 Mar 2024 08:27    146    |  107    |  17     ----------------------------<  217    4.1     |  33     |  1.10   20 Mar 2024 06:15    146    |  107    |  17     ----------------------------<  188    3.3     |  32     |  1.20     Ca    8.8        22 Mar 2024 05:48  Ca    9.3        21 Mar 2024 08:27  Ca    9.1        20 Mar 2024 06:15  Mg     1.7       22 Mar 2024 05:48  Mg     1.6       21 Mar 2024 08:27  Mg     1.7       20 Mar 2024 06:15            12 Lead ECG:   Ventricular Rate 154 BPM    QRS Duration 76 ms    Q-T Interval 282 ms    QTC Calculation(Bazett) 451 ms    R Axis 15 degrees    T Axis 194 degrees    Diagnosis Line *** Critical Test Result: High HR  Atrial fibrillation with rapid ventricular response  Low voltage QRS  Nonspecific ST and T wave abnormality    Confirmed by JOHN KU ([id number]) on 3/17/2024 7:30:13 AM (03-16-24 @ 23:50)      TRANSTHORACIC ECHOCARDIOGRAM REPORT  ________________________________________________________________________________                                      _______       Pt. Name:       SARAH MORRISON Study Date:    3/18/2024  MRN:            [id number]         YOB: 1970  Accession #:    [id number]        Age:           53 years  Account#:       [id number]       Gender:        M  Heart Rate:                      Height:        74.02 in (188.00 cm)  Rhythm:                          Weight:        244.71 lb (111.00 kg)  Blood Pressure: 100/60 mmHg      BSA/BMI:       2.37 m² / 31.41 kg/m²  ________________________________________________________________________________________  Referring Physician:    [id number] Hill Brizuela  Interpreting Physician: Mary Maya MD  Primary Sonographer:    Mounika FELDMAN    CPT:               ECHO TTE WO CON COMP W DOPP - 69658.m  Indication(s):     Dyspnea, unspecified - R06.00  Procedure:         Transthoracic echocardiogram with 2-D, M-mode and complete                     spectral and color flow Doppler.  Ordering Location: [facility]  Admission Status:  Inpatient  Study Information: Image quality for this study is technically difficult.    _______________________________________________________________________________________     CONCLUSIONS:      1. Technically difficult image quality.   2. Left ventricular cavity is normal in size. Left ventricular wall thickness is normal. Left ventricular systolic function is normal with an ejection fraction visually estimated at 50 to 55 %.   3. Normal right ventricular cavity size, with normal wall thickness, and mildly reduced systolic function.   4. The left atrium is moderately dilated.   5. The right atrium is moderately dilated.   6. Moderate to severe mitral regurgitation.   7. Mild to moderate tricuspid regurgitation.   8. Estimated pulmonary artery systolic pressure is 36 mmHg, consistent with mild pulmonary hypertension.   9. The inferior vena cava is dilated measuring 2.40 cm in diameter, (dilated >2.1cm) with normal inspiratory collapse (normal >50%) consistent with mildly elevated right atrial pressure (~8, range 5-10mmHg).  10. Trace pericardial effusion.    ________________________________________________________________________________________  FINDINGS:     Left Ventricle:  The left ventricular cavity is normal in size. Left ventricular wall thickness is normal. Left ventricular systolic function is normal with an ejection fraction visually estimated at 50 to 55%.The left ventricular diastolic function is indeterminate.     Right Ventricle:  The right ventricular cavity is normal in size, with normal wall thickness and mildly reduced systolic function.     Left Atrium:  The left atrium is moderately dilated.     Right Atrium:  The right atrium is moderately dilated.     Aortic Valve:  The aortic valve anatomy cannot be determined with normal systolic excursion. There is no aortic valve stenosis.     Mitral Valve:  There is mild calcification of the mitralvalve annulus. There is moderate to severe mitral regurgitation.     Tricuspid Valve:  Structurally normal tricuspid valve with normal leaflet excursion. There is mild to moderate tricuspid regurgitation. Estimated pulmonary artery systolic pressure is 36 mmHg, consistent with mild pulmonary hypertension.     Pulmonic Valve:  The pulmonic valve was not well visualized. There is trace pulmonic regurgitation.     Pericardium:  There is a trace pericardial effusion.     Systemic Veins:  The inferiorvena cava is dilated measuring 2.40 cm in diameter, (dilated >2.1cm) with normal inspiratory collapse (normal >50%) consistent with mildly elevated right atrial pressure (~8, range 5-10mmHg).  ____________________________________________________________________  QUANTITATIVE DATA:  Left Ventricle Measurements: (Indexed to BSA)     IVSd (2D):   1.9 cm  LVPWd (2D):  1.5 cm  LVIDd (2D):  5.3 cm  LVIDs (2D):  3.9 cm  LV Mass:     413 g  174.4 g/m²  Visualized LV EF%: 50 to 55%     MV E Vmax:    1.15 m/s  e' lateral:   10.90 cm/s  e' medial:    10.50 cm/s  E/e' lateral: 10.56  E/e' medial:  12.00  E/e' Average: 10.76  MV DT:        137 msec    Aorta Measurements: (Normal range) (Indexed to BSA)     Sinuses of Valsalva: 3.50 cm (3.1 - 3.7 cm)       Left Atrium Measurements: (Indexed to BSA)  LA Diam 2D: 3.99 cm       LVOT / RVOT/ Qp/Qs Data: (Indexed to BSA)  LVOT Diameter: 2.22 cm    Mitral Valve Measurements:     MV E Vmax: 1.2 m/s       Tricuspid Valve Measurements:     TR Vmax:          2.6m/s  TR Peak Gradient: 27.7 mmHg  RA Pressure:      8 mmHg  PASP:             36 mmHg    ________________________________________________________________________________________  Electronically signed on 3/19/2024 at 11:27:59 AM by Mary Maya MD     *** Final ***      [facility] -- Brittany Lutz, Reynaldo Ku Savella, , Gera Maya  Office # [phone]    Follow Up: Afib with RVR     Subjective/Observations: Denies SOB overnight but admits to dry cough.  Remains on NC at 2L/min.  Denies CP or palpitations.  No overnight tele events    REVIEW OF SYSTEMS: All other review of systems is negative unless indicated above  PAST MEDICAL & SURGICAL HISTORY:  Diabetes  Diabetes mellitus with no complication  Afib  Hypertension  BPH (benign prostatic hyperplasia)  Perforated gastric ulcer  s/p emergent ex-lap omentopexy and plication 6/2019  Pulmonary embolism  History of non-ST elevation myocardial infarction (NSTEMI)  Osteomyelitis  s/p debridement  CAD S/P percutaneous coronary angioplasty  Cerebrovascular accident  H/O abdominal surgery  Perforated gastric ulcer  Traumatic amputation of left foot, initial encounter    MEDICATIONS  (STANDING):  apixaban 5 milliGRAM(s) Oral every 12 hours  ascorbic acid 500 milliGRAM(s) Oral daily  aspirin  chewable 81 milliGRAM(s) Oral daily  atorvastatin 40 milliGRAM(s) Oral at bedtime  bisacodyl 10 milliGRAM(s) Oral daily  cholecalciferol 1000 Unit(s) Oral daily  dextrose 5%. 1000 milliLiter(s) (100 mL/Hr) IV Continuous <Continuous>  dextrose 5%. 1000 milliLiter(s) (50 mL/Hr) IV Continuous <Continuous>  dextrose 50% Injectable 25 Gram(s) IV Push once  dextrose 50% Injectable 12.5 Gram(s) IV Push once  dextrose 50% Injectable 25 Gram(s) IV Push once  diltiazem    milliGRAM(s) Oral daily  ertapenem  IVPB 1000 milliGRAM(s) IV Intermittent every 24 hours  ferrous    sulfate 325 milliGRAM(s) Oral two times a day  furosemide    Tablet 40 milliGRAM(s) Oral two times a day  gabapentin 300 milliGRAM(s) Oral every 12 hours  glucagon  Injectable 1 milliGRAM(s) IntraMuscular once  insulin glargine Injectable (LANTUS) 15 Unit(s) SubCutaneous every morning  insulin glargine Injectable (LANTUS) 7 Unit(s) SubCutaneous at bedtime  insulin lispro (ADMELOG) corrective regimen sliding scale   SubCutaneous three times a day before meals  insulin lispro (ADMELOG) corrective regimen sliding scale   SubCutaneous at bedtime  insulin lispro Injectable (ADMELOG) 3 Unit(s) SubCutaneous three times a day before meals  loratadine 10 milliGRAM(s) Oral daily  melatonin 5 milliGRAM(s) Oral at bedtime  metoclopramide 5 milliGRAM(s) Oral three times a day  metoprolol tartrate 25 milliGRAM(s) Oral two times a day  multivitamin/minerals 1 Tablet(s) Oral daily  naloxegol 25 milliGRAM(s) Oral daily  oxybutynin 5 milliGRAM(s) Oral two times a day  oxyCODONE    IR 10 milliGRAM(s) Oral two times a day  pantoprazole    Tablet 40 milliGRAM(s) Oral before breakfast  polyethylene glycol 3350 17 Gram(s) Oral two times a day  saccharomyces boulardii 250 milliGRAM(s) Oral two times a day  senna 2 Tablet(s) Oral at bedtime  sodium chloride 0.65% Nasal 1 Spray(s) Both Nostrils daily  sucralfate 1 Gram(s) Oral two times a day  tiotropium 2.5 MICROgram(s) Inhaler 2 Puff(s) Inhalation daily    MEDICATIONS  (PRN):  acetaminophen     Tablet .. 650 milliGRAM(s) Oral every 6 hours PRN Temp greater or equal to 38C (100.4F), Mild Pain (1 - 3)  albuterol    0.083% 2.5 milliGRAM(s) Nebulizer every 4 hours PRN Shortness of Breath and/or Wheezing  aluminum hydroxide/magnesium hydroxide/simethicone Suspension 30 milliLiter(s) Oral every 4 hours PRN Dyspepsia  dextrose Oral Gel 15 Gram(s) Oral once PRN Blood Glucose LESS THAN 70 milliGRAM(s)/deciliter  ondansetron Injectable 4 milliGRAM(s) IV Push every 6 hours PRN Nausea and/or Vomiting    Allergies    No Known Drug Allergies  fish (Hives)    Intolerances    Vital Signs Last 24 Hrs  T(C): 36.8 (23 Mar 2024 04:54), Max: 37.3 (22 Mar 2024 17:45)  T(F): 98.2 (23 Mar 2024 04:54), Max: 99.1 (22 Mar 2024 17:45)  HR: 85 (23 Mar 2024 04:54) (64 - 85)  BP: 99/61 (23 Mar 2024 04:54) (99/61 - 119/73)  BP(mean): --  RR: 18 (23 Mar 2024 04:54) (18 - 18)  SpO2: 92% (23 Mar 2024 04:54) (92% - 95%)    Parameters below as of 23 Mar 2024 04:54  Patient On (Oxygen Delivery Method): nasal cannula    I&O's Summary    21 Mar 2024 07:01  -  22 Mar 2024 07:00  --------------------------------------------------------  IN: 850 mL / OUT: 850 mL / NET: 0 mL    22 Mar 2024 07:01  -  23 Mar 2024 06:12  --------------------------------------------------------  IN: 0 mL / OUT: 1500 mL / NET: -1500 mL    PHYSICAL EXAM:  TELE: Afib  Constitutional: NAD, awake and alert  HEENT: Moist Mucous Membranes, Anicteric  Pulmonary: Non-labored, breath sounds are diminished bilaterally, No wheezing, +rales no rhonchi  Cardiovascular: IRRR, S1 and S2, +murmurs, rubs, gallops or clicks  Gastrointestinal: Bowel Sounds present, soft, nontender.   Lymph: No peripheral edema. No lymphadenopathy.  Skin: No visible rashes.  Right heel ulcer with dressing dry and intact  Psych:  Mood & affect appropriate    LABS: All Labs Reviewed:                        12.6   11.25 )-----------( 219      ( 22 Mar 2024 05:48 )             38.3                         12.1   7.98  )-----------( 194      ( 20 Mar 2024 06:15 )             37.9     22 Mar 2024 05:48    144    |  107    |  17     ----------------------------<  170    3.6     |  33     |  0.99   21 Mar 2024 08:27    146    |  107    |  17     ----------------------------<  217    4.1     |  33     |  1.10   20 Mar 2024 06:15    146    |  107    |  17     ----------------------------<  188    3.3     |  32     |  1.20     Ca    8.8        22 Mar 2024 05:48  Ca    9.3        21 Mar 2024 08:27  Ca    9.1        20 Mar 2024 06:15  Mg     1.7       22 Mar 2024 05:48  Mg     1.6       21 Mar 2024 08:27  Mg     1.7       20 Mar 2024 06:15            12 Lead ECG:   Ventricular Rate 154 BPM    QRS Duration 76 ms    Q-T Interval 282 ms    QTC Calculation(Bazett) 451 ms    R Axis 15 degrees    T Axis 194 degrees    Diagnosis Line *** Critical Test Result: High HR  Atrial fibrillation with rapid ventricular response  Low voltage QRS  Nonspecific ST and T wave abnormality    Confirmed by JOHN KU ([id number]) on 3/17/2024 7:30:13 AM (03-16-24 @ 23:50)      TRANSTHORACIC ECHOCARDIOGRAM REPORT  ________________________________________________________________________________                                      _______       Pt. Name:       SARAH MORRISON Study Date:    3/18/2024  MRN:            [id number]         YOB: 1970  Accession #:    [id number]        Age:           53 years  Account#:       [id number]       Gender:        M  Heart Rate:                      Height:        74.02 in (188.00 cm)  Rhythm:                          Weight:        244.71 lb (111.00 kg)  Blood Pressure: 100/60 mmHg      BSA/BMI:       2.37 m² / 31.41 kg/m²  ________________________________________________________________________________________  Referring Physician:    [id number] Hill Brizeula  Interpreting Physician: Mary Maya MD  Primary Sonographer:    Mounika FELDMAN    CPT:               ECHO TTE WO CON COMP W DOPP - 07548.m  Indication(s):     Dyspnea, unspecified - R06.00  Procedure:         Transthoracic echocardiogram with 2-D, M-mode and complete                     spectral and color flow Doppler.  Ordering Location: [facility]  Admission Status:  Inpatient  Study Information: Image quality for this study is technically difficult.    _______________________________________________________________________________________     CONCLUSIONS:      1. Technically difficult image quality.   2. Left ventricular cavity is normal in size. Left ventricular wall thickness is normal. Left ventricular systolic function is normal with an ejection fraction visually estimated at 50 to 55 %.   3. Normal right ventricular cavity size, with normal wall thickness, and mildly reduced systolic function.   4. The left atrium is moderately dilated.   5. The right atrium is moderately dilated.   6. Moderate to severe mitral regurgitation.   7. Mild to moderate tricuspid regurgitation.   8. Estimated pulmonary artery systolic pressure is 36 mmHg, consistent with mild pulmonary hypertension.   9. The inferior vena cava is dilated measuring 2.40 cm in diameter, (dilated >2.1cm) with normal inspiratory collapse (normal >50%) consistent with mildly elevated right atrial pressure (~8, range 5-10mmHg).  10. Trace pericardial effusion.    ________________________________________________________________________________________  FINDINGS:     Left Ventricle:  The left ventricular cavity is normal in size. Left ventricular wall thickness is normal. Left ventricular systolic function is normal with an ejection fraction visually estimated at 50 to 55%.The left ventricular diastolic function is indeterminate.     Right Ventricle:  The right ventricular cavity is normal in size, with normal wall thickness and mildly reduced systolic function.     Left Atrium:  The left atrium is moderately dilated.     Right Atrium:  The right atrium is moderately dilated.     Aortic Valve:  The aortic valve anatomy cannot be determined with normal systolic excursion. There is no aortic valve stenosis.     Mitral Valve:  There is mild calcification of the mitralvalve annulus. There is moderate to severe mitral regurgitation.     Tricuspid Valve:  Structurally normal tricuspid valve with normal leaflet excursion. There is mild to moderate tricuspid regurgitation. Estimated pulmonary artery systolic pressure is 36 mmHg, consistent with mild pulmonary hypertension.     Pulmonic Valve:  The pulmonic valve was not well visualized. There is trace pulmonic regurgitation.     Pericardium:  There is a trace pericardial effusion.     Systemic Veins:  The inferiorvena cava is dilated measuring 2.40 cm in diameter, (dilated >2.1cm) with normal inspiratory collapse (normal >50%) consistent with mildly elevated right atrial pressure (~8, range 5-10mmHg).  ____________________________________________________________________  QUANTITATIVE DATA:  Left Ventricle Measurements: (Indexed to BSA)     IVSd (2D):   1.9 cm  LVPWd (2D):  1.5 cm  LVIDd (2D):  5.3 cm  LVIDs (2D):  3.9 cm  LV Mass:     413 g  174.4 g/m²  Visualized LV EF%: 50 to 55%     MV E Vmax:    1.15 m/s  e' lateral:   10.90 cm/s  e' medial:    10.50 cm/s  E/e' lateral: 10.56  E/e' medial:  12.00  E/e' Average: 10.76  MV DT:        137 msec    Aorta Measurements: (Normal range) (Indexed to BSA)     Sinuses of Valsalva: 3.50 cm (3.1 - 3.7 cm)       Left Atrium Measurements: (Indexed to BSA)  LA Diam 2D: 3.99 cm       LVOT / RVOT/ Qp/Qs Data: (Indexed to BSA)  LVOT Diameter: 2.22 cm    Mitral Valve Measurements:     MV E Vmax: 1.2 m/s       Tricuspid Valve Measurements:     TR Vmax:          2.6m/s  TR Peak Gradient: 27.7 mmHg  RA Pressure:      8 mmHg  PASP:             36 mmHg    ________________________________________________________________________________________  Electronically signed on 3/19/2024 at 11:27:59 AM by Mary Maya MD     *** Final ***

## 2024-03-24 LAB
ANION GAP SERPL CALC-SCNC: 8 MMOL/L — SIGNIFICANT CHANGE UP (ref 5–17)
BUN SERPL-MCNC: 21 MG/DL — SIGNIFICANT CHANGE UP (ref 7–23)
CALCIUM SERPL-MCNC: 8.7 MG/DL — SIGNIFICANT CHANGE UP (ref 8.5–10.1)
CHLORIDE SERPL-SCNC: 105 MMOL/L — SIGNIFICANT CHANGE UP (ref 96–108)
CO2 SERPL-SCNC: 32 MMOL/L — HIGH (ref 22–31)
CREAT SERPL-MCNC: 1.1 MG/DL — SIGNIFICANT CHANGE UP (ref 0.5–1.3)
EGFR: 79 ML/MIN/1.73M2 — SIGNIFICANT CHANGE UP
GLUCOSE BLDC GLUCOMTR-MCNC: 118 MG/DL — HIGH (ref 70–99)
GLUCOSE BLDC GLUCOMTR-MCNC: 139 MG/DL — HIGH (ref 70–99)
GLUCOSE BLDC GLUCOMTR-MCNC: 142 MG/DL — HIGH (ref 70–99)
GLUCOSE BLDC GLUCOMTR-MCNC: 162 MG/DL — HIGH (ref 70–99)
GLUCOSE BLDC GLUCOMTR-MCNC: 168 MG/DL — HIGH (ref 70–99)
GLUCOSE SERPL-MCNC: 191 MG/DL — HIGH (ref 70–99)
MAGNESIUM SERPL-MCNC: 1.6 MG/DL — SIGNIFICANT CHANGE UP (ref 1.6–2.6)
POTASSIUM SERPL-MCNC: 3.3 MMOL/L — LOW (ref 3.5–5.3)
POTASSIUM SERPL-SCNC: 3.3 MMOL/L — LOW (ref 3.5–5.3)
SODIUM SERPL-SCNC: 145 MMOL/L — SIGNIFICANT CHANGE UP (ref 135–145)

## 2024-03-24 PROCEDURE — 99232 SBSQ HOSP IP/OBS MODERATE 35: CPT

## 2024-03-24 RX ORDER — MAGNESIUM SULFATE 500 MG/ML
2 VIAL (ML) INJECTION ONCE
Refills: 0 | Status: COMPLETED | OUTPATIENT
Start: 2024-03-24 | End: 2024-03-24

## 2024-03-24 RX ORDER — POTASSIUM CHLORIDE 20 MEQ
40 PACKET (EA) ORAL ONCE
Refills: 0 | Status: COMPLETED | OUTPATIENT
Start: 2024-03-24 | End: 2024-03-24

## 2024-03-24 RX ORDER — INSULIN LISPRO 100/ML
5 VIAL (ML) SUBCUTANEOUS
Refills: 0 | Status: DISCONTINUED | OUTPATIENT
Start: 2024-03-24 | End: 2024-03-26

## 2024-03-24 RX ADMIN — Medication 325 MILLIGRAM(S): at 17:18

## 2024-03-24 RX ADMIN — Medication 5 UNIT(S): at 17:18

## 2024-03-24 RX ADMIN — INSULIN GLARGINE 15 UNIT(S): 100 INJECTION, SOLUTION SUBCUTANEOUS at 08:54

## 2024-03-24 RX ADMIN — ATORVASTATIN CALCIUM 40 MILLIGRAM(S): 80 TABLET, FILM COATED ORAL at 21:59

## 2024-03-24 RX ADMIN — Medication 300 MILLIGRAM(S): at 06:01

## 2024-03-24 RX ADMIN — Medication 40 MILLIGRAM(S): at 13:10

## 2024-03-24 RX ADMIN — Medication 25 GRAM(S): at 13:10

## 2024-03-24 RX ADMIN — Medication 5 MILLIGRAM(S): at 17:18

## 2024-03-24 RX ADMIN — Medication 5 MILLIGRAM(S): at 21:58

## 2024-03-24 RX ADMIN — Medication 325 MILLIGRAM(S): at 06:01

## 2024-03-24 RX ADMIN — Medication 500 MILLIGRAM(S): at 11:51

## 2024-03-24 RX ADMIN — APIXABAN 5 MILLIGRAM(S): 2.5 TABLET, FILM COATED ORAL at 17:18

## 2024-03-24 RX ADMIN — Medication 1 GRAM(S): at 06:00

## 2024-03-24 RX ADMIN — Medication 81 MILLIGRAM(S): at 11:51

## 2024-03-24 RX ADMIN — OXYCODONE HYDROCHLORIDE 10 MILLIGRAM(S): 5 TABLET ORAL at 06:31

## 2024-03-24 RX ADMIN — OXYCODONE HYDROCHLORIDE 10 MILLIGRAM(S): 5 TABLET ORAL at 18:19

## 2024-03-24 RX ADMIN — Medication 40 MILLIEQUIVALENT(S): at 11:50

## 2024-03-24 RX ADMIN — Medication 5 MILLIGRAM(S): at 06:01

## 2024-03-24 RX ADMIN — Medication 1 GRAM(S): at 17:18

## 2024-03-24 RX ADMIN — Medication 3 UNIT(S): at 08:52

## 2024-03-24 RX ADMIN — PANTOPRAZOLE SODIUM 40 MILLIGRAM(S): 20 TABLET, DELAYED RELEASE ORAL at 06:00

## 2024-03-24 RX ADMIN — Medication 250 MILLIGRAM(S): at 17:18

## 2024-03-24 RX ADMIN — GABAPENTIN 300 MILLIGRAM(S): 400 CAPSULE ORAL at 06:01

## 2024-03-24 RX ADMIN — Medication 10 MILLIGRAM(S): at 11:51

## 2024-03-24 RX ADMIN — Medication 1 TABLET(S): at 11:51

## 2024-03-24 RX ADMIN — OXYCODONE HYDROCHLORIDE 10 MILLIGRAM(S): 5 TABLET ORAL at 05:59

## 2024-03-24 RX ADMIN — Medication 40 MILLIGRAM(S): at 06:00

## 2024-03-24 RX ADMIN — Medication 1000 UNIT(S): at 11:51

## 2024-03-24 RX ADMIN — Medication 2: at 13:10

## 2024-03-24 RX ADMIN — Medication 250 MILLIGRAM(S): at 06:00

## 2024-03-24 RX ADMIN — APIXABAN 5 MILLIGRAM(S): 2.5 TABLET, FILM COATED ORAL at 06:02

## 2024-03-24 RX ADMIN — Medication 5 MILLIGRAM(S): at 13:10

## 2024-03-24 RX ADMIN — OXYCODONE HYDROCHLORIDE 10 MILLIGRAM(S): 5 TABLET ORAL at 17:19

## 2024-03-24 RX ADMIN — GABAPENTIN 300 MILLIGRAM(S): 400 CAPSULE ORAL at 17:19

## 2024-03-24 RX ADMIN — Medication 1 SPRAY(S): at 11:54

## 2024-03-24 RX ADMIN — ERTAPENEM SODIUM 120 MILLIGRAM(S): 1 INJECTION, POWDER, LYOPHILIZED, FOR SOLUTION INTRAMUSCULAR; INTRAVENOUS at 21:59

## 2024-03-24 RX ADMIN — Medication 2: at 08:52

## 2024-03-24 RX ADMIN — TIOTROPIUM BROMIDE 2 PUFF(S): 18 CAPSULE ORAL; RESPIRATORY (INHALATION) at 06:03

## 2024-03-24 RX ADMIN — Medication 5 MILLIGRAM(S): at 06:06

## 2024-03-24 RX ADMIN — LORATADINE 10 MILLIGRAM(S): 10 TABLET ORAL at 11:51

## 2024-03-24 RX ADMIN — SENNA PLUS 2 TABLET(S): 8.6 TABLET ORAL at 21:58

## 2024-03-24 RX ADMIN — NALOXEGOL OXALATE 25 MILLIGRAM(S): 12.5 TABLET, FILM COATED ORAL at 11:50

## 2024-03-24 RX ADMIN — INSULIN GLARGINE 7 UNIT(S): 100 INJECTION, SOLUTION SUBCUTANEOUS at 21:59

## 2024-03-24 NOTE — PROGRESS NOTE ADULT - ASSESSMENT
53M with PMH of AF on Eliquis, indwelling jacques, CAD s/p stents, CVA, DM, HTN, osteomyelitis s/p debridement, PE, sent from [facility] for elevated BP, SOB, and dislodged jacques    AF  Emphysema  Atelectasis  CAD  CVA  DM  HTN  OP  OA  hx of OM  hx of PE    cvs rx regimen optimization and cardio f/u  Vascular and ENDO eval noted - DM foot ulcer  s/p Midline insertion - ABX  on LASIX    ct neg for pe - atelectasis - emphysema  spiriva - nebs prn - VBG  cardio eval in progress  cvs rx regimen  proBNP noted to be elevated  diuresis - as per cardio recs  TTE reviewed  AF management  rate and rhythm control  GERARDO eval as outpatient  monitored unit admission  pulse ox monitoring  goal sat > 88 pct  DM care  serial FS  OLD records reviewed

## 2024-03-24 NOTE — PROGRESS NOTE ADULT - SUBJECTIVE AND OBJECTIVE BOX
[facility] -- Brittany Lutz, Reynaldo Ku Savella, , Gera Maya  Office # [phone]    Follow Up:      Subjective/Observations:     REVIEW OF SYSTEMS: All other review of systems is negative unless indicated above  PAST MEDICAL & SURGICAL HISTORY:  Diabetes  Diabetes mellitus with no complication  Afib  Hypertension  BPH (benign prostatic hyperplasia)  Perforated gastric ulcer  s/p emergent ex-lap omentopexy and plication 6/2019  Pulmonary embolism  History of non-ST elevation myocardial infarction (NSTEMI)  Osteomyelitis  s/p debridement  CAD S/P percutaneous coronary angioplasty  Cerebrovascular accident  H/O abdominal surgery  Perforated gastric ulcer  Traumatic amputation of left foot, initial encounter    MEDICATIONS  (STANDING):  apixaban 5 milliGRAM(s) Oral every 12 hours  ascorbic acid 500 milliGRAM(s) Oral daily  aspirin  chewable 81 milliGRAM(s) Oral daily  atorvastatin 40 milliGRAM(s) Oral at bedtime  bisacodyl 10 milliGRAM(s) Oral daily  cholecalciferol 1000 Unit(s) Oral daily  dextrose 5%. 1000 milliLiter(s) (100 mL/Hr) IV Continuous <Continuous>  dextrose 5%. 1000 milliLiter(s) (50 mL/Hr) IV Continuous <Continuous>  dextrose 50% Injectable 25 Gram(s) IV Push once  dextrose 50% Injectable 12.5 Gram(s) IV Push once  dextrose 50% Injectable 25 Gram(s) IV Push once  diltiazem    milliGRAM(s) Oral daily  ertapenem  IVPB 1000 milliGRAM(s) IV Intermittent every 24 hours  ferrous    sulfate 325 milliGRAM(s) Oral two times a day  furosemide    Tablet 40 milliGRAM(s) Oral two times a day  gabapentin 300 milliGRAM(s) Oral every 12 hours  glucagon  Injectable 1 milliGRAM(s) IntraMuscular once  insulin glargine Injectable (LANTUS) 15 Unit(s) SubCutaneous every morning  insulin glargine Injectable (LANTUS) 7 Unit(s) SubCutaneous at bedtime  insulin lispro (ADMELOG) corrective regimen sliding scale   SubCutaneous three times a day before meals  insulin lispro (ADMELOG) corrective regimen sliding scale   SubCutaneous at bedtime  insulin lispro Injectable (ADMELOG) 3 Unit(s) SubCutaneous three times a day before meals  loratadine 10 milliGRAM(s) Oral daily  melatonin 5 milliGRAM(s) Oral at bedtime  metoclopramide 5 milliGRAM(s) Oral three times a day  multivitamin/minerals 1 Tablet(s) Oral daily  naloxegol 25 milliGRAM(s) Oral daily  oxybutynin 5 milliGRAM(s) Oral two times a day  oxyCODONE    IR 10 milliGRAM(s) Oral two times a day  pantoprazole    Tablet 40 milliGRAM(s) Oral before breakfast  polyethylene glycol 3350 17 Gram(s) Oral two times a day  saccharomyces boulardii 250 milliGRAM(s) Oral two times a day  senna 2 Tablet(s) Oral at bedtime  sodium chloride 0.65% Nasal 1 Spray(s) Both Nostrils daily  sucralfate 1 Gram(s) Oral two times a day  tiotropium 2.5 MICROgram(s) Inhaler 2 Puff(s) Inhalation daily    MEDICATIONS  (PRN):  acetaminophen     Tablet .. 650 milliGRAM(s) Oral every 6 hours PRN Temp greater or equal to 38C (100.4F), Mild Pain (1 - 3)  albuterol    0.083% 2.5 milliGRAM(s) Nebulizer every 4 hours PRN Shortness of Breath and/or Wheezing  aluminum hydroxide/magnesium hydroxide/simethicone Suspension 30 milliLiter(s) Oral every 4 hours PRN Dyspepsia  dextrose Oral Gel 15 Gram(s) Oral once PRN Blood Glucose LESS THAN 70 milliGRAM(s)/deciliter  ondansetron Injectable 4 milliGRAM(s) IV Push every 6 hours PRN Nausea and/or Vomiting    Allergies    No Known Drug Allergies  fish (Hives)    Intolerances      Vital Signs Last 24 Hrs  T(C): 36.5 (24 Mar 2024 04:49), Max: 37.3 (23 Mar 2024 20:40)  T(F): 97.7 (24 Mar 2024 04:49), Max: 99.1 (23 Mar 2024 20:40)  HR: 62 (24 Mar 2024 04:49) (62 - 83)  BP: 114/79 (24 Mar 2024 04:49) (113/74 - 116/72)  BP(mean): --  RR: 18 (24 Mar 2024 04:49) (16 - 18)  SpO2: 94% (24 Mar 2024 04:49) (92% - 95%)    Parameters below as of 24 Mar 2024 04:49  Patient On (Oxygen Delivery Method): nasal cannula      I&O's Summary    22 Mar 2024 07:01  -  23 Mar 2024 07:00  --------------------------------------------------------  IN: 0 mL / OUT: 1500 mL / NET: -1500 mL    23 Mar 2024 07:01  -  24 Mar 2024 06:54  --------------------------------------------------------  IN: 840 mL / OUT: 3000 mL / NET: -2160 mL      PHYSICAL EXAM:  TELE:   Constitutional: NAD, awake and alert, well-developed  HEENT: Moist Mucous Membranes, Anicteric  Pulmonary: Non-labored, breath sounds are clear bilaterally, No wheezing, rales or rhonchi  Cardiovascular: Regular, S1 and S2, No murmurs, rubs, gallops or clicks  Gastrointestinal: Bowel Sounds present, soft, nontender.   Lymph: No peripheral edema. No lymphadenopathy.  Skin: No visible rashes or ulcers.  Psych:  Mood & affect appropriate  LABS: All Labs Reviewed:                        12.6   11.25 )-----------( 219      ( 22 Mar 2024 05:48 )             38.3     22 Mar 2024 05:48    144    |  107    |  17     ----------------------------<  170    3.6     |  33     |  0.99   21 Mar 2024 08:27    146    |  107    |  17     ----------------------------<  217    4.1     |  33     |  1.10     Ca    8.8        22 Mar 2024 05:48  Ca    9.3        21 Mar 2024 08:27  Mg     1.7       22 Mar 2024 05:48  Mg     1.6       21 Mar 2024 08:27    12 Lead ECG:   Ventricular Rate 154 BPM    QRS Duration 76 ms    Q-T Interval 282 ms    QTC Calculation(Bazett) 451 ms    R Axis 15 degrees    T Axis 194 degrees    Diagnosis Line *** Critical Test Result: High HR  Atrial fibrillation with rapid ventricular response  Low voltage QRS  Nonspecific ST and T wave abnormality    Confirmed by JOHN KU ([id number]) on 3/17/2024 7:30:13 AM (03-16-24 @ 23:50)      TRANSTHORACIC ECHOCARDIOGRAM REPORT  ________________________________________________________________________________                                      _______  Pt. Name:       SARAH MORRISON Study Date:    3/18/2024  MRN:            [id number]         YOB: 1970  Accession #:    [id number]        Age:           53 years  Account#:       [id number]       Gender:        M  Heart Rate:                      Height:        74.02 in (188.00 cm)  Rhythm:                          Weight:        244.71 lb (111.00 kg)  Blood Pressure: 100/60 mmHg      BSA/BMI:       2.37 m² / 31.41 kg/m²  ________________________________________________________________________________________  Referring Physician:    [id number] Hill Brizuela  Interpreting Physician: Mary Maya MD  Primary Sonographer:    Mounika FELDMAN    CPT:               ECHO TTE WO CON COMP W DOPP - 19465.m  Indication(s):     Dyspnea, unspecified - R06.00  Procedure:         Transthoracic echocardiogram with 2-D, M-mode and complete                     spectral and color flow Doppler.  Ordering Location: [facility]  Admission Status:  Inpatient  Study Information: Image quality for this study is technically difficult.    _______________________________________________________________________________________     CONCLUSIONS:      1. Technically difficult image quality.   2. Left ventricular cavity is normal in size. Left ventricular wall thickness is normal. Left ventricular systolic function is normal with an ejection fraction visually estimated at 50 to 55 %.   3. Normal right ventricular cavity size, with normal wall thickness, and mildly reduced systolic function.   4. The left atrium is moderately dilated.   5. The right atrium is moderately dilated.   6. Moderate to severe mitral regurgitation.   7. Mild to moderate tricuspid regurgitation.   8. Estimated pulmonary artery systolic pressure is 36 mmHg, consistent with mild pulmonary hypertension.   9. The inferior vena cava is dilated measuring 2.40 cm in diameter, (dilated >2.1cm) with normal inspiratory collapse (normal >50%) consistent with mildly elevated right atrial pressure (~8, range 5-10mmHg).  10. Trace pericardial effusion.    ________________________________________________________________________________________  FINDINGS:     Left Ventricle:  The left ventricular cavity is normal in size. Left ventricular wall thickness is normal. Left ventricular systolic function is normal with an ejection fraction visually estimated at 50 to 55%.The left ventricular diastolic function is indeterminate.     Right Ventricle:  The right ventricular cavity is normal in size, with normal wall thickness and mildly reduced systolic function.     Left Atrium:  The left atrium is moderately dilated.     Right Atrium:  The right atrium is moderately dilated.     Aortic Valve:  The aortic valve anatomy cannot be determined with normal systolic excursion. There is no aortic valve stenosis.     Mitral Valve:  There is mild calcification of the mitralvalve annulus. There is moderate to severe mitral regurgitation.     Tricuspid Valve:  Structurally normal tricuspid valve with normal leaflet excursion. There is mild to moderate tricuspid regurgitation. Estimated pulmonary artery systolic pressure is 36 mmHg, consistent with mild pulmonary hypertension.     Pulmonic Valve:  The pulmonic valve was not well visualized. There is trace pulmonic regurgitation.     Pericardium:  There is a trace pericardial effusion.     Systemic Veins:  The inferiorvena cava is dilated measuring 2.40 cm in diameter, (dilated >2.1cm) with normal inspiratory collapse (normal >50%) consistent with mildly elevated right atrial pressure (~8, range 5-10mmHg).  ____________________________________________________________________  QUANTITATIVE DATA:  Left Ventricle Measurements: (Indexed to BSA)     IVSd (2D):   1.9 cm  LVPWd (2D):  1.5 cm  LVIDd (2D):  5.3 cm  LVIDs (2D):  3.9 cm  LV Mass:     413 g  174.4 g/m²  Visualized LV EF%: 50 to 55%     MV E Vmax:    1.15 m/s  e' lateral:   10.90 cm/s  e' medial:    10.50 cm/s  E/e' lateral: 10.56  E/e' medial:  12.00  E/e' Average: 10.76  MV DT:        137 msec    Aorta Measurements: (Normal range) (Indexed to BSA)     Sinuses of Valsalva: 3.50 cm (3.1 - 3.7 cm)       Left Atrium Measurements: (Indexed to BSA)  LA Diam 2D: 3.99 cm       LVOT / RVOT/ Qp/Qs Data: (Indexed to BSA)  LVOT Diameter: 2.22 cm    Mitral Valve Measurements:     MV E Vmax: 1.2 m/s       Tricuspid Valve Measurements:     TR Vmax:          2.6m/s  TR Peak Gradient: 27.7 mmHg  RA Pressure:      8 mmHg  PASP:             36 mmHg    ________________________________________________________________________________________  Electronically signed on 3/19/2024 at 11:27:59 AM by Mary Maya MD         *** Final ***      [facility] -- Brittany Lutz, Reynaldo Ku Savella, , Gera Maya  Office # [phone]    Follow Up:   Afib with RVR    Subjective/Observations: States, he is fine.  Denies cough, SOB, DUBOIS.  Denies CP or palpitations.  Mostly rate-controlled overnight    REVIEW OF SYSTEMS: All other review of systems is negative unless indicated above  PAST MEDICAL & SURGICAL HISTORY:  Diabetes  Diabetes mellitus with no complication  Afib  Hypertension  BPH (benign prostatic hyperplasia)  Perforated gastric ulcer  s/p emergent ex-lap omentopexy and plication 6/2019  Pulmonary embolism  History of non-ST elevation myocardial infarction (NSTEMI)  Osteomyelitis  s/p debridement  CAD S/P percutaneous coronary angioplasty  Cerebrovascular accident  H/O abdominal surgery  Perforated gastric ulcer  Traumatic amputation of left foot, initial encounter    MEDICATIONS  (STANDING):  apixaban 5 milliGRAM(s) Oral every 12 hours  ascorbic acid 500 milliGRAM(s) Oral daily  aspirin  chewable 81 milliGRAM(s) Oral daily  atorvastatin 40 milliGRAM(s) Oral at bedtime  bisacodyl 10 milliGRAM(s) Oral daily  cholecalciferol 1000 Unit(s) Oral daily  dextrose 5%. 1000 milliLiter(s) (100 mL/Hr) IV Continuous <Continuous>  dextrose 5%. 1000 milliLiter(s) (50 mL/Hr) IV Continuous <Continuous>  dextrose 50% Injectable 25 Gram(s) IV Push once  dextrose 50% Injectable 12.5 Gram(s) IV Push once  dextrose 50% Injectable 25 Gram(s) IV Push once  diltiazem    milliGRAM(s) Oral daily  ertapenem  IVPB 1000 milliGRAM(s) IV Intermittent every 24 hours  ferrous    sulfate 325 milliGRAM(s) Oral two times a day  furosemide    Tablet 40 milliGRAM(s) Oral two times a day  gabapentin 300 milliGRAM(s) Oral every 12 hours  glucagon  Injectable 1 milliGRAM(s) IntraMuscular once  insulin glargine Injectable (LANTUS) 15 Unit(s) SubCutaneous every morning  insulin glargine Injectable (LANTUS) 7 Unit(s) SubCutaneous at bedtime  insulin lispro (ADMELOG) corrective regimen sliding scale   SubCutaneous three times a day before meals  insulin lispro (ADMELOG) corrective regimen sliding scale   SubCutaneous at bedtime  insulin lispro Injectable (ADMELOG) 3 Unit(s) SubCutaneous three times a day before meals  loratadine 10 milliGRAM(s) Oral daily  melatonin 5 milliGRAM(s) Oral at bedtime  metoclopramide 5 milliGRAM(s) Oral three times a day  multivitamin/minerals 1 Tablet(s) Oral daily  naloxegol 25 milliGRAM(s) Oral daily  oxybutynin 5 milliGRAM(s) Oral two times a day  oxyCODONE    IR 10 milliGRAM(s) Oral two times a day  pantoprazole    Tablet 40 milliGRAM(s) Oral before breakfast  polyethylene glycol 3350 17 Gram(s) Oral two times a day  saccharomyces boulardii 250 milliGRAM(s) Oral two times a day  senna 2 Tablet(s) Oral at bedtime  sodium chloride 0.65% Nasal 1 Spray(s) Both Nostrils daily  sucralfate 1 Gram(s) Oral two times a day  tiotropium 2.5 MICROgram(s) Inhaler 2 Puff(s) Inhalation daily    MEDICATIONS  (PRN):  acetaminophen     Tablet .. 650 milliGRAM(s) Oral every 6 hours PRN Temp greater or equal to 38C (100.4F), Mild Pain (1 - 3)  albuterol    0.083% 2.5 milliGRAM(s) Nebulizer every 4 hours PRN Shortness of Breath and/or Wheezing  aluminum hydroxide/magnesium hydroxide/simethicone Suspension 30 milliLiter(s) Oral every 4 hours PRN Dyspepsia  dextrose Oral Gel 15 Gram(s) Oral once PRN Blood Glucose LESS THAN 70 milliGRAM(s)/deciliter  ondansetron Injectable 4 milliGRAM(s) IV Push every 6 hours PRN Nausea and/or Vomiting    Allergies    No Known Drug Allergies  fish (Hives)    Intolerances    Vital Signs Last 24 Hrs  T(C): 36.5 (24 Mar 2024 04:49), Max: 37.3 (23 Mar 2024 20:40)  T(F): 97.7 (24 Mar 2024 04:49), Max: 99.1 (23 Mar 2024 20:40)  HR: 62 (24 Mar 2024 04:49) (62 - 83)  BP: 114/79 (24 Mar 2024 04:49) (113/74 - 116/72)  BP(mean): --  RR: 18 (24 Mar 2024 04:49) (16 - 18)  SpO2: 94% (24 Mar 2024 04:49) (92% - 95%)    Parameters below as of 24 Mar 2024 04:49  Patient On (Oxygen Delivery Method): nasal cannula    I&O's Summary    22 Mar 2024 07:01  -  23 Mar 2024 07:00  --------------------------------------------------------  IN: 0 mL / OUT: 1500 mL / NET: -1500 mL    23 Mar 2024 07:01  -  24 Mar 2024 06:54  --------------------------------------------------------  IN: 840 mL / OUT: 3000 mL / NET: -2160 mL      PHYSICAL EXAM:  TELE: Afib  Constitutional: NAD, awake and alert  HEENT: Moist Mucous Membranes, Anicteric  Pulmonary: Non-labored, breath sounds are diminished bilaterally, No wheezing, no rales no rhonchi  Cardiovascular: IRRR, S1 and S2, +murmurs, rubs, gallops or clicks  Gastrointestinal: Bowel Sounds present, soft, nontender.   Lymph: No peripheral edema. No lymphadenopathy.  Skin: No visible rashes.  Right heel ulcer with dressing dry and intact  Psych:  Mood & affect appropriate    LABS: All Labs Reviewed:                        12.6   11.25 )-----------( 219      ( 22 Mar 2024 05:48 )             38.3     22 Mar 2024 05:48    144    |  107    |  17     ----------------------------<  170    3.6     |  33     |  0.99   21 Mar 2024 08:27    146    |  107    |  17     ----------------------------<  217    4.1     |  33     |  1.10     Ca    8.8        22 Mar 2024 05:48  Ca    9.3        21 Mar 2024 08:27  Mg     1.7       22 Mar 2024 05:48  Mg     1.6       21 Mar 2024 08:27    12 Lead ECG:   Ventricular Rate 154 BPM    QRS Duration 76 ms    Q-T Interval 282 ms    QTC Calculation(Bazett) 451 ms    R Axis 15 degrees    T Axis 194 degrees    Diagnosis Line *** Critical Test Result: High HR  Atrial fibrillation with rapid ventricular response  Low voltage QRS  Nonspecific ST and T wave abnormality    Confirmed by JOHN KU ([id number]) on 3/17/2024 7:30:13 AM (03-16-24 @ 23:50)      TRANSTHORACIC ECHOCARDIOGRAM REPORT  ________________________________________________________________________________                                      _______  Pt. Name:       SARAH MORRISON Study Date:    3/18/2024  MRN:            [id number]         YOB: 1970  Accession #:    [id number]        Age:           53 years  Account#:       [id number]       Gender:        M  Heart Rate:                      Height:        74.02 in (188.00 cm)  Rhythm:                          Weight:        244.71 lb (111.00 kg)  Blood Pressure: 100/60 mmHg      BSA/BMI:       2.37 m² / 31.41 kg/m²  ________________________________________________________________________________________  Referring Physician:    [id number] Hill Brizuela  Interpreting Physician: Mary Maya MD  Primary Sonographer:    Mounika FELDMAN    CPT:               ECHO TTE WO CON COMP W DOPP - 07955.m  Indication(s):     Dyspnea, unspecified - R06.00  Procedure:         Transthoracic echocardiogram with 2-D, M-mode and complete                     spectral and color flow Doppler.  Ordering Location: [facility]  Admission Status:  Inpatient  Study Information: Image quality for this study is technically difficult.    _______________________________________________________________________________________     CONCLUSIONS:      1. Technically difficult image quality.   2. Left ventricular cavity is normal in size. Left ventricular wall thickness is normal. Left ventricular systolic function is normal with an ejection fraction visually estimated at 50 to 55 %.   3. Normal right ventricular cavity size, with normal wall thickness, and mildly reduced systolic function.   4. The left atrium is moderately dilated.   5. The right atrium is moderately dilated.   6. Moderate to severe mitral regurgitation.   7. Mild to moderate tricuspid regurgitation.   8. Estimated pulmonary artery systolic pressure is 36 mmHg, consistent with mild pulmonary hypertension.   9. The inferior vena cava is dilated measuring 2.40 cm in diameter, (dilated >2.1cm) with normal inspiratory collapse (normal >50%) consistent with mildly elevated right atrial pressure (~8, range 5-10mmHg).  10. Trace pericardial effusion.    ________________________________________________________________________________________  FINDINGS:     Left Ventricle:  The left ventricular cavity is normal in size. Left ventricular wall thickness is normal. Left ventricular systolic function is normal with an ejection fraction visually estimated at 50 to 55%.The left ventricular diastolic function is indeterminate.     Right Ventricle:  The right ventricular cavity is normal in size, with normal wall thickness and mildly reduced systolic function.     Left Atrium:  The left atrium is moderately dilated.     Right Atrium:  The right atrium is moderately dilated.     Aortic Valve:  The aortic valve anatomy cannot be determined with normal systolic excursion. There is no aortic valve stenosis.     Mitral Valve:  There is mild calcification of the mitralvalve annulus. There is moderate to severe mitral regurgitation.     Tricuspid Valve:  Structurally normal tricuspid valve with normal leaflet excursion. There is mild to moderate tricuspid regurgitation. Estimated pulmonary artery systolic pressure is 36 mmHg, consistent with mild pulmonary hypertension.     Pulmonic Valve:  The pulmonic valve was not well visualized. There is trace pulmonic regurgitation.     Pericardium:  There is a trace pericardial effusion.     Systemic Veins:  The inferiorvena cava is dilated measuring 2.40 cm in diameter, (dilated >2.1cm) with normal inspiratory collapse (normal >50%) consistent with mildly elevated right atrial pressure (~8, range 5-10mmHg).  ____________________________________________________________________  QUANTITATIVE DATA:  Left Ventricle Measurements: (Indexed to BSA)     IVSd (2D):   1.9 cm  LVPWd (2D):  1.5 cm  LVIDd (2D):  5.3 cm  LVIDs (2D):  3.9 cm  LV Mass:     413 g  174.4 g/m²  Visualized LV EF%: 50 to 55%     MV E Vmax:    1.15 m/s  e' lateral:   10.90 cm/s  e' medial:    10.50 cm/s  E/e' lateral: 10.56  E/e' medial:  12.00  E/e' Average: 10.76  MV DT:        137 msec    Aorta Measurements: (Normal range) (Indexed to BSA)     Sinuses of Valsalva: 3.50 cm (3.1 - 3.7 cm)       Left Atrium Measurements: (Indexed to BSA)  LA Diam 2D: 3.99 cm       LVOT / RVOT/ Qp/Qs Data: (Indexed to BSA)  LVOT Diameter: 2.22 cm    Mitral Valve Measurements:     MV E Vmax: 1.2 m/s       Tricuspid Valve Measurements:     TR Vmax:          2.6m/s  TR Peak Gradient: 27.7 mmHg  RA Pressure:      8 mmHg  PASP:             36 mmHg    ________________________________________________________________________________________  Electronically signed on 3/19/2024 at 11:27:59 AM by Mary Maya MD         *** Final ***      [facility] -- Brittany Lutz, Reynaldo Ku Savella, , Gera Maya  Office # [phone]    Follow Up:   Afib with RVR    Subjective/Observations: States, he is fine.  Denies cough, SOB, DUBOIS.  Denies CP or palpitations.  Mostly rate-controlled overnight    REVIEW OF SYSTEMS: All other review of systems is negative unless indicated above  PAST MEDICAL & SURGICAL HISTORY:  Diabetes  Diabetes mellitus with no complication  Afib  Hypertension  BPH (benign prostatic hyperplasia)  Perforated gastric ulcer  s/p emergent ex-lap omentopexy and plication 6/2019  Pulmonary embolism  History of non-ST elevation myocardial infarction (NSTEMI)  Osteomyelitis  s/p debridement  CAD S/P percutaneous coronary angioplasty  Cerebrovascular accident  H/O abdominal surgery  Perforated gastric ulcer  Traumatic amputation of left foot, initial encounter    MEDICATIONS  (STANDING):  apixaban 5 milliGRAM(s) Oral every 12 hours  ascorbic acid 500 milliGRAM(s) Oral daily  aspirin  chewable 81 milliGRAM(s) Oral daily  atorvastatin 40 milliGRAM(s) Oral at bedtime  bisacodyl 10 milliGRAM(s) Oral daily  cholecalciferol 1000 Unit(s) Oral daily  dextrose 5%. 1000 milliLiter(s) (100 mL/Hr) IV Continuous <Continuous>  dextrose 5%. 1000 milliLiter(s) (50 mL/Hr) IV Continuous <Continuous>  dextrose 50% Injectable 25 Gram(s) IV Push once  dextrose 50% Injectable 12.5 Gram(s) IV Push once  dextrose 50% Injectable 25 Gram(s) IV Push once  diltiazem    milliGRAM(s) Oral daily  ertapenem  IVPB 1000 milliGRAM(s) IV Intermittent every 24 hours  ferrous    sulfate 325 milliGRAM(s) Oral two times a day  furosemide    Tablet 40 milliGRAM(s) Oral two times a day  gabapentin 300 milliGRAM(s) Oral every 12 hours  glucagon  Injectable 1 milliGRAM(s) IntraMuscular once  insulin glargine Injectable (LANTUS) 15 Unit(s) SubCutaneous every morning  insulin glargine Injectable (LANTUS) 7 Unit(s) SubCutaneous at bedtime  insulin lispro (ADMELOG) corrective regimen sliding scale   SubCutaneous three times a day before meals  insulin lispro (ADMELOG) corrective regimen sliding scale   SubCutaneous at bedtime  insulin lispro Injectable (ADMELOG) 3 Unit(s) SubCutaneous three times a day before meals  loratadine 10 milliGRAM(s) Oral daily  melatonin 5 milliGRAM(s) Oral at bedtime  metoclopramide 5 milliGRAM(s) Oral three times a day  multivitamin/minerals 1 Tablet(s) Oral daily  naloxegol 25 milliGRAM(s) Oral daily  oxybutynin 5 milliGRAM(s) Oral two times a day  oxyCODONE    IR 10 milliGRAM(s) Oral two times a day  pantoprazole    Tablet 40 milliGRAM(s) Oral before breakfast  polyethylene glycol 3350 17 Gram(s) Oral two times a day  saccharomyces boulardii 250 milliGRAM(s) Oral two times a day  senna 2 Tablet(s) Oral at bedtime  sodium chloride 0.65% Nasal 1 Spray(s) Both Nostrils daily  sucralfate 1 Gram(s) Oral two times a day  tiotropium 2.5 MICROgram(s) Inhaler 2 Puff(s) Inhalation daily    MEDICATIONS  (PRN):  acetaminophen     Tablet .. 650 milliGRAM(s) Oral every 6 hours PRN Temp greater or equal to 38C (100.4F), Mild Pain (1 - 3)  albuterol    0.083% 2.5 milliGRAM(s) Nebulizer every 4 hours PRN Shortness of Breath and/or Wheezing  aluminum hydroxide/magnesium hydroxide/simethicone Suspension 30 milliLiter(s) Oral every 4 hours PRN Dyspepsia  dextrose Oral Gel 15 Gram(s) Oral once PRN Blood Glucose LESS THAN 70 milliGRAM(s)/deciliter  ondansetron Injectable 4 milliGRAM(s) IV Push every 6 hours PRN Nausea and/or Vomiting    Allergies    No Known Drug Allergies  fish (Hives)    Intolerances    Vital Signs Last 24 Hrs  T(C): 36.5 (24 Mar 2024 04:49), Max: 37.3 (23 Mar 2024 20:40)  T(F): 97.7 (24 Mar 2024 04:49), Max: 99.1 (23 Mar 2024 20:40)  HR: 62 (24 Mar 2024 04:49) (62 - 83)  BP: 114/79 (24 Mar 2024 04:49) (113/74 - 116/72)  BP(mean): --  RR: 18 (24 Mar 2024 04:49) (16 - 18)  SpO2: 94% (24 Mar 2024 04:49) (92% - 95%)    Parameters below as of 24 Mar 2024 04:49  Patient On (Oxygen Delivery Method): nasal cannula    I&O's Summary    22 Mar 2024 07:01  -  23 Mar 2024 07:00  --------------------------------------------------------  IN: 0 mL / OUT: 1500 mL / NET: -1500 mL    23 Mar 2024 07:01  -  24 Mar 2024 06:54  --------------------------------------------------------  IN: 840 mL / OUT: 3000 mL / NET: -2160 mL      PHYSICAL EXAM:  TELE: Afib  Constitutional: NAD, awake and alert  HEENT: Moist Mucous Membranes, Anicteric  Pulmonary: Non-labored, breath sounds are diminished bilaterally, No wheezing, no rales no rhonchi  Cardiovascular: IRRR, S1 and S2, +murmurs, rubs, gallops or clicks  Gastrointestinal: Bowel Sounds present, soft, nontender.   Lymph: No peripheral edema. No lymphadenopathy.  Skin: No visible rashes.  Right heel ulcer with dressing dry and intact  Psych:  Mood & affect appropriate    LABS: All Labs Reviewed:                        12.6   11.25 )-----------( 219      ( 22 Mar 2024 05:48 )             38.3     22 Mar 2024 05:48    144    |  107    |  17     ----------------------------<  170    3.6     |  33     |  0.99   21 Mar 2024 08:27    146    |  107    |  17     ----------------------------<  217    4.1     |  33     |  1.10     Ca    8.8        22 Mar 2024 05:48  Ca    9.3        21 Mar 2024 08:27  Mg     1.7       22 Mar 2024 05:48  Mg     1.6       21 Mar 2024 08:27    12 Lead ECG:   Ventricular Rate 154 BPM    QRS Duration 76 ms    Q-T Interval 282 ms    QTC Calculation(Bazett) 451 ms    R Axis 15 degrees    T Axis 194 degrees    Diagnosis Line *** Critical Test Result: High HR  Atrial fibrillation with rapid ventricular response  Low voltage QRS  Nonspecific ST and T wave abnormality    Confirmed by JOHN KU ([id number]) on 3/17/2024 7:30:13 AM (03-16-24 @ 23:50)      TRANSTHORACIC ECHOCARDIOGRAM REPORT  ________________________________________________________________________________                                      _______  Pt. Name:       SARAH MORRISON Study Date:    3/18/2024  MRN:            [id number]         YOB: 1970  Accession #:    [id number]        Age:           53 years  Account#:       [id number]       Gender:        M  Heart Rate:                      Height:        74.02 in (188.00 cm)  Rhythm:                          Weight:        244.71 lb (111.00 kg)  Blood Pressure: 100/60 mmHg      BSA/BMI:       2.37 m² / 31.41 kg/m²  ________________________________________________________________________________________  Referring Physician:    [id number] Hill Brizuela  Interpreting Physician: Mary Maya MD  Primary Sonographer:    Mounika FELDMAN    CPT:               ECHO TTE WO CON COMP W DOPP - 86532.m  Indication(s):     Dyspnea, unspecified - R06.00  Procedure:         Transthoracic echocardiogram with 2-D, M-mode and complete                     spectral and color flow Doppler.  Ordering Location: [facility]  Admission Status:  Inpatient  Study Information: Image quality for this study is technically difficult.    _______________________________________________________________________________________     CONCLUSIONS:      1. Technically difficult image quality.   2. Left ventricular cavity is normal in size. Left ventricular wall thickness is normal. Left ventricular systolic function is normal with an ejection fraction visually estimated at 50 to 55 %.   3. Normal right ventricular cavity size, with normal wall thickness, and mildly reduced systolic function.   4. The left atrium is moderately dilated.   5. The right atrium is moderately dilated.   6. Moderate to severe mitral regurgitation.   7. Mild to moderate tricuspid regurgitation.   8. Estimated pulmonary artery systolic pressure is 36 mmHg, consistent with mild pulmonary hypertension.   9. The inferior vena cava is dilated measuring 2.40 cm in diameter, (dilated >2.1cm) with normal inspiratory collapse (normal >50%) consistent with mildly elevated right atrial pressure (~8, range 5-10mmHg).  10. Trace pericardial effusion.    ________________________________________________________________________________________  FINDINGS:     Left Ventricle:  The left ventricular cavity is normal in size. Left ventricular wall thickness is normal. Left ventricular systolic function is normal with an ejection fraction visually estimated at 50 to 55%.The left ventricular diastolic function is indeterminate.     Right Ventricle:  The right ventricular cavity is normal in size, with normal wall thickness and mildly reduced systolic function.     Left Atrium:  The left atrium is moderately dilated.     Right Atrium:  The right atrium is moderately dilated.     Aortic Valve:  The aortic valve anatomy cannot be determined with normal systolic excursion. There is no aortic valve stenosis.     Mitral Valve:  There is mild calcification of the mitralvalve annulus. There is moderate to severe mitral regurgitation.     Tricuspid Valve:  Structurally normal tricuspid valve with normal leaflet excursion. There is mild to moderate tricuspid regurgitation. Estimated pulmonary artery systolic pressure is 36 mmHg, consistent with mild pulmonary hypertension.     Pulmonic Valve:  The pulmonic valve was not well visualized. There is trace pulmonic regurgitation.     Pericardium:  There is a trace pericardial effusion.     Systemic Veins:  The inferiorvena cava is dilated measuring 2.40 cm in diameter, (dilated >2.1cm) with normal inspiratory collapse (normal >50%) consistent with mildly elevated right atrial pressure (~8, range 5-10mmHg).  ____________________________________________________________________  QUANTITATIVE DATA:  Left Ventricle Measurements: (Indexed to BSA)     IVSd (2D):   1.9 cm  LVPWd (2D):  1.5 cm  LVIDd (2D):  5.3 cm  LVIDs (2D):  3.9 cm  LV Mass:     413 g  174.4 g/m²  Visualized LV EF%: 50 to 55%     MV E Vmax:    1.15 m/s  e' lateral:   10.90 cm/s  e' medial:    10.50 cm/s  E/e' lateral: 10.56  E/e' medial:  12.00  E/e' Average: 10.76  MV DT:        137 msec    Aorta Measurements: (Normal range) (Indexed to BSA)     Sinuses of Valsalva: 3.50 cm (3.1 - 3.7 cm)       Left Atrium Measurements: (Indexed to BSA)  LA Diam 2D: 3.99 cm       LVOT / RVOT/ Qp/Qs Data: (Indexed to BSA)  LVOT Diameter: 2.22 cm    Mitral Valve Measurements:     MV E Vmax: 1.2 m/s       Tricuspid Valve Measurements:     TR Vmax:          2.6m/s  TR Peak Gradient: 27.7 mmHg  RA Pressure:      8 mmHg  PASP:             36 mmHg    ________________________________________________________________________________________  Electronically signed on 3/19/2024 at 11:27:59 AM by Mary Maya MD         *** Final ***

## 2024-03-24 NOTE — PROGRESS NOTE ADULT - ASSESSMENT
53M with PMH of AF on Eliquis, indwelling Aguilera CAD s/p stents, CVA, DM, HTN, osteomyelitis s/p debridement, PE, sent from [facility] for elevated BP, SOB, and dislodged Aguilera, found to be febrile in ER, a/w sepsis and A fib RVR.     Sepsis, Afib RVR, CAD, HTN  - Known A fib, now rate-controlled.  s/p Dig load and Cardizem gtt  - Dig level = 1.5.  Keep off Dig, not a home med.   - Had a 3.4 sec pause, 3/22.  BB D/C's.  No further pauses noted  - Continue Cardizem CD at reduced dose of 300 mg daily.  If tachycardic, can increase to 360 mg daily  - Continue Eliquis for AC    - BNP 7622.  Still requiring NC at 2L/min.  Wean as tolerated.  Follow up CxR official report  - Continue PO Lasix   - TTE (12/2023) normal LV & RV size and function EF 55-60%, indeterminate DD, mod dilated LA and RA, mod MR, mild TR, PASP 31  - Technically difficult ECHO w/ normal LV size and function EF 50-55%, mildly reduced RV systolic function, mod dilated LA and RA, mod to sev MR, mild to mod TR, PASP 36  - Creatinine remains normal.  Hypernatremia resolved    - EKG: A fib RVR @ 154  - Troponin: <-51, no need to trend  - No evidence of any active ischemia   - Continue aspirin and statin     - BP stable and controlled.  No further hypotensive episode  - Continue to hold home Imdur and Losartan to allow room for AV nodals     - Sepsis likely 2/2 UTI  - Management per primary team     - Monitor and replete lytes, keep K>4, Mg>2.  - Will continue to follow.    Henrietta Kim DNP, NP-C, AGACNP-C  Cardiology   Call TEAMS

## 2024-03-24 NOTE — PROGRESS NOTE ADULT - SUBJECTIVE AND OBJECTIVE BOX
CAPILLARY BLOOD GLUCOSE      POCT Blood Glucose.: 168 mg/dL (24 Mar 2024 07:59)  POCT Blood Glucose.: 224 mg/dL (23 Mar 2024 21:24)  POCT Blood Glucose.: 211 mg/dL (23 Mar 2024 16:51)      Vital Signs Last 24 Hrs  T(C): 36.5 (24 Mar 2024 04:49), Max: 37.3 (23 Mar 2024 20:40)  T(F): 97.7 (24 Mar 2024 04:49), Max: 99.1 (23 Mar 2024 20:40)  HR: 62 (24 Mar 2024 04:49) (62 - 81)  BP: 114/79 (24 Mar 2024 04:49) (113/74 - 116/72)  BP(mean): --  RR: 18 (24 Mar 2024 04:49) (16 - 18)  SpO2: 94% (24 Mar 2024 04:49) (94% - 95%)    Parameters below as of 24 Mar 2024 04:49  Patient On (Oxygen Delivery Method): nasal cannula        General: WN/WD NAD  Respiratory: CTA B/L  CV: RRR, S1S2, no murmurs, rubs or gallops  Abdominal: Soft, NT, ND +BS, Last BM  Extremities: No edema, + peripheral pulses     03-24    145  |  105  |  21  ----------------------------<  191<H>  3.3<L>   |  32<H>  |  1.10    Ca    8.7      24 Mar 2024 07:45  Mg     1.6     03-24        atorvastatin 40 milliGRAM(s) Oral at bedtime  dextrose 50% Injectable 25 Gram(s) IV Push once  dextrose 50% Injectable 12.5 Gram(s) IV Push once  dextrose 50% Injectable 25 Gram(s) IV Push once  dextrose Oral Gel 15 Gram(s) Oral once PRN  glucagon  Injectable 1 milliGRAM(s) IntraMuscular once  insulin glargine Injectable (LANTUS) 15 Unit(s) SubCutaneous every morning  insulin glargine Injectable (LANTUS) 7 Unit(s) SubCutaneous at bedtime  insulin lispro (ADMELOG) corrective regimen sliding scale   SubCutaneous three times a day before meals  insulin lispro (ADMELOG) corrective regimen sliding scale   SubCutaneous at bedtime  insulin lispro Injectable (ADMELOG) 3 Unit(s) SubCutaneous three times a day before meals

## 2024-03-24 NOTE — PROGRESS NOTE ADULT - SUBJECTIVE AND OBJECTIVE BOX
Date of Service: 03-24-24 @ 14:47    Patient is a 53y old  Male who presents with a chief complaint of fever (23 Mar 2024 10:37)      INTERVAL HPI/OVERNIGHT EVENTS: Patient seen and examined. NAD. No complaints.    Vital Signs Last 24 Hrs  T(C): 36.9 (24 Mar 2024 12:45), Max: 37.3 (23 Mar 2024 20:40)  T(F): 98.5 (24 Mar 2024 12:45), Max: 99.1 (23 Mar 2024 20:40)  HR: 80 (24 Mar 2024 12:45) (62 - 81)  BP: 120/73 (24 Mar 2024 12:45) (114/79 - 120/73)  BP(mean): --  RR: 17 (24 Mar 2024 12:45) (16 - 18)  SpO2: 93% (24 Mar 2024 12:45) (93% - 95%)    Parameters below as of 24 Mar 2024 12:45  Patient On (Oxygen Delivery Method): room air        03-24    145  |  105  |  21  ----------------------------<  191<H>  3.3<L>   |  32<H>  |  1.10    Ca    8.7      24 Mar 2024 07:45  Mg     1.6     03-24          CAPILLARY BLOOD GLUCOSE      POCT Blood Glucose.: 162 mg/dL (24 Mar 2024 12:20)  POCT Blood Glucose.: 168 mg/dL (24 Mar 2024 07:59)  POCT Blood Glucose.: 224 mg/dL (23 Mar 2024 21:24)  POCT Blood Glucose.: 211 mg/dL (23 Mar 2024 16:51)    Urinalysis Basic - ( 24 Mar 2024 07:45 )    Color: x / Appearance: x / SG: x / pH: x  Gluc: 191 mg/dL / Ketone: x  / Bili: x / Urobili: x   Blood: x / Protein: x / Nitrite: x   Leuk Esterase: x / RBC: x / WBC x   Sq Epi: x / Non Sq Epi: x / Bacteria: x              acetaminophen     Tablet .. 650 milliGRAM(s) Oral every 6 hours PRN  albuterol    0.083% 2.5 milliGRAM(s) Nebulizer every 4 hours PRN  aluminum hydroxide/magnesium hydroxide/simethicone Suspension 30 milliLiter(s) Oral every 4 hours PRN  apixaban 5 milliGRAM(s) Oral every 12 hours  ascorbic acid 500 milliGRAM(s) Oral daily  aspirin  chewable 81 milliGRAM(s) Oral daily  atorvastatin 40 milliGRAM(s) Oral at bedtime  bisacodyl 10 milliGRAM(s) Oral daily  cholecalciferol 1000 Unit(s) Oral daily  dextrose 5%. 1000 milliLiter(s) IV Continuous <Continuous>  dextrose 5%. 1000 milliLiter(s) IV Continuous <Continuous>  dextrose 50% Injectable 25 Gram(s) IV Push once  dextrose 50% Injectable 12.5 Gram(s) IV Push once  dextrose 50% Injectable 25 Gram(s) IV Push once  dextrose Oral Gel 15 Gram(s) Oral once PRN  diltiazem    milliGRAM(s) Oral daily  ertapenem  IVPB 1000 milliGRAM(s) IV Intermittent every 24 hours  ferrous    sulfate 325 milliGRAM(s) Oral two times a day  furosemide    Tablet 40 milliGRAM(s) Oral two times a day  gabapentin 300 milliGRAM(s) Oral every 12 hours  glucagon  Injectable 1 milliGRAM(s) IntraMuscular once  insulin glargine Injectable (LANTUS) 15 Unit(s) SubCutaneous every morning  insulin glargine Injectable (LANTUS) 7 Unit(s) SubCutaneous at bedtime  insulin lispro (ADMELOG) corrective regimen sliding scale   SubCutaneous three times a day before meals  insulin lispro (ADMELOG) corrective regimen sliding scale   SubCutaneous at bedtime  insulin lispro Injectable (ADMELOG) 5 Unit(s) SubCutaneous three times a day before meals  loratadine 10 milliGRAM(s) Oral daily  melatonin 5 milliGRAM(s) Oral at bedtime  metoclopramide 5 milliGRAM(s) Oral three times a day  multivitamin/minerals 1 Tablet(s) Oral daily  naloxegol 25 milliGRAM(s) Oral daily  ondansetron Injectable 4 milliGRAM(s) IV Push every 6 hours PRN  oxybutynin 5 milliGRAM(s) Oral two times a day  oxyCODONE    IR 10 milliGRAM(s) Oral two times a day  pantoprazole    Tablet 40 milliGRAM(s) Oral before breakfast  polyethylene glycol 3350 17 Gram(s) Oral two times a day  saccharomyces boulardii 250 milliGRAM(s) Oral two times a day  senna 2 Tablet(s) Oral at bedtime  sodium chloride 0.65% Nasal 1 Spray(s) Both Nostrils daily  sucralfate 1 Gram(s) Oral two times a day  tiotropium 2.5 MICROgram(s) Inhaler 2 Puff(s) Inhalation daily              REVIEW OF SYSTEMS:  CONSTITUTIONAL: No fever, no weight loss, or no fatigue  NECK: No pain, no stiffness  RESPIRATORY: No cough, no wheezing, no chills, no hemoptysis, No shortness of breath  CARDIOVASCULAR: No chest pain, no palpitations, no dizziness, no leg swelling  GASTROINTESTINAL: No abdominal pain. No nausea, no vomiting, no hematemesis; No diarrhea, no constipation. No melena, no hematochezia.  GENITOURINARY: No dysuria, no frequency, no hematuria, no incontinence  NEUROLOGICAL: No headaches, no loss of strength, no numbness, no tremors  SKIN: No itching, no burning  MUSCULOSKELETAL: No joint pain, no swelling; No muscle, no back, no extremity pain  PSYCHIATRIC: No depression, no mood swings,   HEME/LYMPH: No easy bruising, no bleeding gums  ALLERY AND IMMUNOLOGIC: No hives       Consultant(s) Notes Reviewed:  [X] YES  [ ] NO    PHYSICAL EXAM:  GENERAL: NAD  HEAD:  Atraumatic, Normocephalic  EYES: EOMI, PERRLA, conjunctiva and sclera clear  ENMT: No tonsillar erythema, exudates, or enlargement; Moist mucous membranes  NECK: Supple, No JVD  NERVOUS SYSTEM:  Awake & alert  CHEST/LUNG: Clear to auscultation bilaterally; No rales, rhonchi, wheezing,  HEART: Regular rate and rhythm  ABDOMEN: Soft, Nontender, Nondistended; Bowel sounds present  EXTREMITIES:  No clubbing, cyanosis, or edema  LYMPH: No lymphadenopathy noted  SKIN: No rashes      Advanced care planning discussed with patient/family [X] YES   [ ] NO    Advanced care planning discussed with patient/family. Patient's health status was discussed. All appropriate changes have been made regarding patient's end-of-life care. Advanced care planning forms reviewed/discussed/completed.  20 minutes spent.

## 2024-03-24 NOTE — PROGRESS NOTE ADULT - SUBJECTIVE AND OBJECTIVE BOX
Date/Time Patient Seen:  		  Referring MD:   Data Reviewed	       Patient is a 53y old  Male who presents with a chief complaint of fever (23 Mar 2024 10:37)      Subjective/HPI     PAST MEDICAL & SURGICAL HISTORY:  No pertinent past medical history    Diabetes    No pertinent past medical history    Diabetes mellitus with no complication    Afib    Hypertension    BPH (benign prostatic hyperplasia)    Perforated gastric ulcer  s/p emergent ex-lap omentopexy and plication 6/2019    Pulmonary embolism    History of non-ST elevation myocardial infarction (NSTEMI)    Osteomyelitis  s/p debridement    CAD S/P percutaneous coronary angioplasty    Cerebrovascular accident    No significant past surgical history    No significant past surgical history    H/O abdominal surgery    Perforated gastric ulcer    Traumatic amputation of left foot, initial encounter          Medication list         MEDICATIONS  (STANDING):  apixaban 5 milliGRAM(s) Oral every 12 hours  ascorbic acid 500 milliGRAM(s) Oral daily  aspirin  chewable 81 milliGRAM(s) Oral daily  atorvastatin 40 milliGRAM(s) Oral at bedtime  bisacodyl 10 milliGRAM(s) Oral daily  cholecalciferol 1000 Unit(s) Oral daily  dextrose 5%. 1000 milliLiter(s) (100 mL/Hr) IV Continuous <Continuous>  dextrose 5%. 1000 milliLiter(s) (50 mL/Hr) IV Continuous <Continuous>  dextrose 50% Injectable 25 Gram(s) IV Push once  dextrose 50% Injectable 12.5 Gram(s) IV Push once  dextrose 50% Injectable 25 Gram(s) IV Push once  diltiazem    milliGRAM(s) Oral daily  ertapenem  IVPB 1000 milliGRAM(s) IV Intermittent every 24 hours  ferrous    sulfate 325 milliGRAM(s) Oral two times a day  furosemide    Tablet 40 milliGRAM(s) Oral two times a day  gabapentin 300 milliGRAM(s) Oral every 12 hours  glucagon  Injectable 1 milliGRAM(s) IntraMuscular once  insulin glargine Injectable (LANTUS) 15 Unit(s) SubCutaneous every morning  insulin glargine Injectable (LANTUS) 7 Unit(s) SubCutaneous at bedtime  insulin lispro (ADMELOG) corrective regimen sliding scale   SubCutaneous three times a day before meals  insulin lispro (ADMELOG) corrective regimen sliding scale   SubCutaneous at bedtime  insulin lispro Injectable (ADMELOG) 3 Unit(s) SubCutaneous three times a day before meals  loratadine 10 milliGRAM(s) Oral daily  melatonin 5 milliGRAM(s) Oral at bedtime  metoclopramide 5 milliGRAM(s) Oral three times a day  multivitamin/minerals 1 Tablet(s) Oral daily  naloxegol 25 milliGRAM(s) Oral daily  oxybutynin 5 milliGRAM(s) Oral two times a day  oxyCODONE    IR 10 milliGRAM(s) Oral two times a day  pantoprazole    Tablet 40 milliGRAM(s) Oral before breakfast  polyethylene glycol 3350 17 Gram(s) Oral two times a day  saccharomyces boulardii 250 milliGRAM(s) Oral two times a day  senna 2 Tablet(s) Oral at bedtime  sodium chloride 0.65% Nasal 1 Spray(s) Both Nostrils daily  sucralfate 1 Gram(s) Oral two times a day  tiotropium 2.5 MICROgram(s) Inhaler 2 Puff(s) Inhalation daily    MEDICATIONS  (PRN):  acetaminophen     Tablet .. 650 milliGRAM(s) Oral every 6 hours PRN Temp greater or equal to 38C (100.4F), Mild Pain (1 - 3)  albuterol    0.083% 2.5 milliGRAM(s) Nebulizer every 4 hours PRN Shortness of Breath and/or Wheezing  aluminum hydroxide/magnesium hydroxide/simethicone Suspension 30 milliLiter(s) Oral every 4 hours PRN Dyspepsia  dextrose Oral Gel 15 Gram(s) Oral once PRN Blood Glucose LESS THAN 70 milliGRAM(s)/deciliter  ondansetron Injectable 4 milliGRAM(s) IV Push every 6 hours PRN Nausea and/or Vomiting         Vitals log        ICU Vital Signs Last 24 Hrs  T(C): 36.5 (24 Mar 2024 04:49), Max: 37.3 (23 Mar 2024 20:40)  T(F): 97.7 (24 Mar 2024 04:49), Max: 99.1 (23 Mar 2024 20:40)  HR: 62 (24 Mar 2024 04:49) (62 - 83)  BP: 114/79 (24 Mar 2024 04:49) (113/74 - 116/72)  BP(mean): --  ABP: --  ABP(mean): --  RR: 18 (24 Mar 2024 04:49) (16 - 18)  SpO2: 94% (24 Mar 2024 04:49) (92% - 95%)    O2 Parameters below as of 24 Mar 2024 04:49  Patient On (Oxygen Delivery Method): nasal cannula                 Input and Output:  I&O's Detail    22 Mar 2024 07:01  -  23 Mar 2024 07:00  --------------------------------------------------------  IN:  Total IN: 0 mL    OUT:    Indwelling Catheter - Urethral (mL): 1500 mL  Total OUT: 1500 mL    Total NET: -1500 mL      23 Mar 2024 07:01  -  24 Mar 2024 05:34  --------------------------------------------------------  IN:    Oral Fluid: 840 mL  Total IN: 840 mL    OUT:    Indwelling Catheter - Urethral (mL): 3000 mL  Total OUT: 3000 mL    Total NET: -2160 mL          Lab Data                        12.6   11.25 )-----------( 219      ( 22 Mar 2024 05:48 )             38.3     03-22    144  |  107  |  17  ----------------------------<  170<H>  3.6   |  33<H>  |  0.99    Ca    8.8      22 Mar 2024 05:48  Mg     1.7     03-22              Review of Systems	      Objective     Physical Examination    heart s1s2  lung dec BS  head nc      Pertinent Lab findings & Imaging      Ale:  NO   Adequate UO     I&O's Detail    22 Mar 2024 07:01  -  23 Mar 2024 07:00  --------------------------------------------------------  IN:  Total IN: 0 mL    OUT:    Indwelling Catheter - Urethral (mL): 1500 mL  Total OUT: 1500 mL    Total NET: -1500 mL      23 Mar 2024 07:01  -  24 Mar 2024 05:34  --------------------------------------------------------  IN:    Oral Fluid: 840 mL  Total IN: 840 mL    OUT:    Indwelling Catheter - Urethral (mL): 3000 mL  Total OUT: 3000 mL    Total NET: -2160 mL               Discussed with:     Cultures:	        Radiology

## 2024-03-25 ENCOUNTER — TRANSCRIPTION ENCOUNTER (OUTPATIENT)
Age: 54
End: 2024-03-25

## 2024-03-25 LAB
ANION GAP SERPL CALC-SCNC: 8 MMOL/L — SIGNIFICANT CHANGE UP (ref 5–17)
BUN SERPL-MCNC: 18 MG/DL — SIGNIFICANT CHANGE UP (ref 7–23)
CALCIUM SERPL-MCNC: 8.7 MG/DL — SIGNIFICANT CHANGE UP (ref 8.5–10.1)
CHLORIDE SERPL-SCNC: 107 MMOL/L — SIGNIFICANT CHANGE UP (ref 96–108)
CO2 SERPL-SCNC: 30 MMOL/L — SIGNIFICANT CHANGE UP (ref 22–31)
CREAT SERPL-MCNC: 1.1 MG/DL — SIGNIFICANT CHANGE UP (ref 0.5–1.3)
EGFR: 79 ML/MIN/1.73M2 — SIGNIFICANT CHANGE UP
GLUCOSE BLDC GLUCOMTR-MCNC: 113 MG/DL — HIGH (ref 70–99)
GLUCOSE BLDC GLUCOMTR-MCNC: 116 MG/DL — HIGH (ref 70–99)
GLUCOSE BLDC GLUCOMTR-MCNC: 117 MG/DL — HIGH (ref 70–99)
GLUCOSE BLDC GLUCOMTR-MCNC: 163 MG/DL — HIGH (ref 70–99)
GLUCOSE SERPL-MCNC: 112 MG/DL — HIGH (ref 70–99)
HCT VFR BLD CALC: 41.5 % — SIGNIFICANT CHANGE UP (ref 39–50)
HGB BLD-MCNC: 13.3 G/DL — SIGNIFICANT CHANGE UP (ref 13–17)
MAGNESIUM SERPL-MCNC: 1.9 MG/DL — SIGNIFICANT CHANGE UP (ref 1.6–2.6)
MCHC RBC-ENTMCNC: 30.4 PG — SIGNIFICANT CHANGE UP (ref 27–34)
MCHC RBC-ENTMCNC: 32 GM/DL — SIGNIFICANT CHANGE UP (ref 32–36)
MCV RBC AUTO: 95 FL — SIGNIFICANT CHANGE UP (ref 80–100)
NRBC # BLD: 0 /100 WBCS — SIGNIFICANT CHANGE UP (ref 0–0)
PLATELET # BLD AUTO: 300 K/UL — SIGNIFICANT CHANGE UP (ref 150–400)
POTASSIUM SERPL-MCNC: 3.3 MMOL/L — LOW (ref 3.5–5.3)
POTASSIUM SERPL-SCNC: 3.3 MMOL/L — LOW (ref 3.5–5.3)
RBC # BLD: 4.37 M/UL — SIGNIFICANT CHANGE UP (ref 4.2–5.8)
RBC # FLD: 12.7 % — SIGNIFICANT CHANGE UP (ref 10.3–14.5)
SODIUM SERPL-SCNC: 145 MMOL/L — SIGNIFICANT CHANGE UP (ref 135–145)
WBC # BLD: 10.44 K/UL — SIGNIFICANT CHANGE UP (ref 3.8–10.5)
WBC # FLD AUTO: 10.44 K/UL — SIGNIFICANT CHANGE UP (ref 3.8–10.5)

## 2024-03-25 PROCEDURE — 99233 SBSQ HOSP IP/OBS HIGH 50: CPT

## 2024-03-25 RX ORDER — POTASSIUM CHLORIDE 20 MEQ
40 PACKET (EA) ORAL ONCE
Refills: 0 | Status: COMPLETED | OUTPATIENT
Start: 2024-03-25 | End: 2024-03-25

## 2024-03-25 RX ORDER — DILTIAZEM HCL 120 MG
360 CAPSULE, EXT RELEASE 24 HR ORAL DAILY
Refills: 0 | Status: DISCONTINUED | OUTPATIENT
Start: 2024-03-26 | End: 2024-03-26

## 2024-03-25 RX ORDER — OXYCODONE HYDROCHLORIDE 5 MG/1
10 TABLET ORAL
Refills: 0 | Status: DISCONTINUED | OUTPATIENT
Start: 2024-03-25 | End: 2024-03-26

## 2024-03-25 RX ADMIN — INSULIN GLARGINE 7 UNIT(S): 100 INJECTION, SOLUTION SUBCUTANEOUS at 22:11

## 2024-03-25 RX ADMIN — Medication 10 MILLIGRAM(S): at 12:04

## 2024-03-25 RX ADMIN — Medication 1 GRAM(S): at 17:04

## 2024-03-25 RX ADMIN — Medication 5 MILLIGRAM(S): at 06:52

## 2024-03-25 RX ADMIN — Medication 325 MILLIGRAM(S): at 17:04

## 2024-03-25 RX ADMIN — TIOTROPIUM BROMIDE 2 PUFF(S): 18 CAPSULE ORAL; RESPIRATORY (INHALATION) at 13:06

## 2024-03-25 RX ADMIN — Medication 1 TABLET(S): at 12:04

## 2024-03-25 RX ADMIN — Medication 250 MILLIGRAM(S): at 06:52

## 2024-03-25 RX ADMIN — Medication 1 SPRAY(S): at 12:22

## 2024-03-25 RX ADMIN — ATORVASTATIN CALCIUM 40 MILLIGRAM(S): 80 TABLET, FILM COATED ORAL at 22:12

## 2024-03-25 RX ADMIN — SENNA PLUS 2 TABLET(S): 8.6 TABLET ORAL at 22:12

## 2024-03-25 RX ADMIN — Medication 5 MILLIGRAM(S): at 13:09

## 2024-03-25 RX ADMIN — Medication 40 MILLIGRAM(S): at 13:09

## 2024-03-25 RX ADMIN — Medication 250 MILLIGRAM(S): at 17:20

## 2024-03-25 RX ADMIN — Medication 40 MILLIGRAM(S): at 06:51

## 2024-03-25 RX ADMIN — Medication 1 GRAM(S): at 06:51

## 2024-03-25 RX ADMIN — Medication 300 MILLIGRAM(S): at 06:52

## 2024-03-25 RX ADMIN — Medication 5 MILLIGRAM(S): at 17:03

## 2024-03-25 RX ADMIN — ERTAPENEM SODIUM 120 MILLIGRAM(S): 1 INJECTION, POWDER, LYOPHILIZED, FOR SOLUTION INTRAMUSCULAR; INTRAVENOUS at 22:11

## 2024-03-25 RX ADMIN — Medication 1000 UNIT(S): at 12:04

## 2024-03-25 RX ADMIN — PANTOPRAZOLE SODIUM 40 MILLIGRAM(S): 20 TABLET, DELAYED RELEASE ORAL at 06:52

## 2024-03-25 RX ADMIN — Medication 5 UNIT(S): at 17:46

## 2024-03-25 RX ADMIN — GABAPENTIN 300 MILLIGRAM(S): 400 CAPSULE ORAL at 17:03

## 2024-03-25 RX ADMIN — Medication 5 MILLIGRAM(S): at 22:12

## 2024-03-25 RX ADMIN — GABAPENTIN 300 MILLIGRAM(S): 400 CAPSULE ORAL at 06:52

## 2024-03-25 RX ADMIN — APIXABAN 5 MILLIGRAM(S): 2.5 TABLET, FILM COATED ORAL at 06:52

## 2024-03-25 RX ADMIN — NALOXEGOL OXALATE 25 MILLIGRAM(S): 12.5 TABLET, FILM COATED ORAL at 12:04

## 2024-03-25 RX ADMIN — INSULIN GLARGINE 15 UNIT(S): 100 INJECTION, SOLUTION SUBCUTANEOUS at 08:40

## 2024-03-25 RX ADMIN — POLYETHYLENE GLYCOL 3350 17 GRAM(S): 17 POWDER, FOR SOLUTION ORAL at 06:51

## 2024-03-25 RX ADMIN — Medication 325 MILLIGRAM(S): at 06:52

## 2024-03-25 RX ADMIN — Medication 40 MILLIEQUIVALENT(S): at 22:11

## 2024-03-25 RX ADMIN — LORATADINE 10 MILLIGRAM(S): 10 TABLET ORAL at 12:04

## 2024-03-25 RX ADMIN — Medication 5 UNIT(S): at 12:19

## 2024-03-25 RX ADMIN — Medication 500 MILLIGRAM(S): at 12:05

## 2024-03-25 RX ADMIN — OXYCODONE HYDROCHLORIDE 10 MILLIGRAM(S): 5 TABLET ORAL at 17:03

## 2024-03-25 NOTE — H&P ADULT - HISTORY OF PRESENT ILLNESS
49M with PMH of PAF on eliquis, RUL subsegmental PE, hx of recent NSTEMI, hx of perforated antral ulcer, osteomyelitis s/p debridement,  HTN and DM2 presenting to the ED complaining of chest pain. Chest pain started last night, described as a sharp pain, originating on left side and spreads across chest. At one point last night it traveled up to his left jaw. Not exacerbated by activity, position. Had some SOB when he first arrived to the ED last night, now resolved. No N/V, palpitations, LE edema.    Of note, patient recently admitted to [facility] for perforated antral ulcer with emergent ex-lap omentopexy and plication. Post op developed chest pain and found to have new onset Afib, elevated troponin level, diagnosed with NSTEMI. Started on amiodarone, converted to SR. Patient was to follow up outpatient for ischemic eval, but has been at rehab for past 6 weeks and has not had the opportunity to do so. In June, patient returned with chest pain, similar to what he is feeling now. Found to have RUL subsegmental PE. Treated with heparin drip and transitioned to eliquis. Came back to the ED on Sunday for abdominal pain, diagnosed with constipation.  Just discharged from [facility] rehab.    In the ED, patient's vitals were: T98.3F, HR 66, /66, RR 16 and SpO2 98% on room air. Significant labs include: wbc 11.04, h/h 10.2/32.8, D-dimer 478, BUN/Cr 15/1.4 (as per chart review baseline 1.1-1.3), Trops neg x3  CXR: no acute findings  EKG:   Pt received asa 325mg x1, 2L normal saline bolus, NGx1, morphine 2mg x2, morphine 4mg x1. Patient here with ADHF. Recent TTE done showing EF of 20-25% with dilated IVC. Patient was being diuresed with bumex 4mg IV BID but had poor response, now switched to bumex infusion at 2mg/hour with diuril dose on 3/19 and had UOP of ~3.6L. In last 24 hours, without diuril on bumex infusion with 3% HTS 150cc bolus, UOP of 2.5L.    -patient volume status much improved, weight improved but no standing weight noted  -agree with CHF team that, though I/O appears to show inadequate diuresis, patient is close to euvolemic.  change bumex gtt to PO dosing  -CHF team to discuss CardioMEMS device with family 49M with PMH of PAF on eliquis, RUL subsegmental PE, hx of recent NSTEMI, hx of perforated antral ulcer, osteomyelitis s/p debridement,  HTN and DM2 presenting to the ED complaining of chest pain. Chest pain started last night when climbing a flight of stairs. Pain described as sharp, burning pain, originating on left side and spreads across chest. At one point last night it traveled up to his left jaw and down his left arm. At onset, the pain was 10/10. Pain was associated with diaphoresis and SOB. Not exacerbated by activity, position. Patient had a friend take him to the [place] ED after symptoms did not dissipate. Had some SOB when he first arrived to the ED last night, now resolved. No N/V, palpitations, LE edema.      Of note, patient recently admitted to [facility] for perforated antral ulcer with emergent ex-lap omentopexy and plication. Post op developed chest pain and found to have new onset Afib, elevated troponin level, diagnosed with NSTEMI. Started on amiodarone, converted to SR. Patient was to follow up outpatient for ischemic eval, but has been at rehab for past 6 weeks and has not had the opportunity to do so. In June, patient returned with chest pain, similar to what he is feeling now. Found to have RUL subsegmental PE. Treated with heparin drip and transitioned to eliquis. Came back to the ED on Sunday for abdominal pain, diagnosed with constipation.  Just discharged from [facility] rehab.    In the ED, patient's vitals were: T98.3F, HR 66, /66, RR 16 and SpO2 98% on room air. Significant labs include: wbc 11.04, h/h 10.2/32.8, D-dimer 478, BUN/Cr 15/1.4 (as per chart review baseline 1.1-1.3), Trops neg x3  CXR: no acute findings  EKG:   Pt received asa 325mg x1, 2L normal saline bolus, NGx1, morphine 2mg x2, morphine 4mg x1. 49M with PMH of PAF on eliquis, RUL subsegmental PE, hx of recent NSTEMI, hx of perforated antral ulcer, osteomyelitis s/p debridement,  HTN and DM2 presenting to the ED complaining of chest pain. Chest pain started last night when climbing a flight of stairs. Pain described as sharp, burning pain, originating on left side and spreads across chest. At one point last night it traveled up to his left jaw and down his left arm. At onset, the pain was 10/10. Pain was associated with diaphoresis and SOB. Not exacerbated by activity, position. Patient had a friend take him to the [place] ED after symptoms did not dissipate. Had some SOB when he first arrived to the ED last night, now resolved. No N/V, palpitations, LE edema.      Of note, patient recently admitted to [place] for perforated antral ulcer with emergent ex-lap omentopexy and plication. Post op developed chest pain and found to have new onset Afib, elevated troponin level, diagnosed with NSTEMI. Started on amiodarone, converted to SR. Patient was to follow up outpatient for ischemic eval, but has been at rehab for past 6 weeks and has not had the opportunity to do so. In June, patient returned with chest pain, similar to what he is feeling now. Found to have RUL subsegmental PE. Treated with heparin drip and transitioned to eliquis. Came back to the ED on Sunday for abdominal pain, diagnosed with constipation.  Just discharged from [facility] rehab.    In the ED, patient's vitals were: T98.3F, HR 66, /66, RR 16 and SpO2 98% on room air. Significant labs include: wbc 11.04, h/h 10.2/32.8, D-dimer 478, BUN/Cr 15/1.4 (as per chart review baseline 1.1-1.3), Trops neg x3  CXR: no acute findings  EKG: Normal sinus rhythm. 60 BPM  Pt received asa 325mg x1, 2L normal saline bolus, NGx1, morphine 2mg x2, morphine 4mg x1.

## 2024-03-25 NOTE — PROGRESS NOTE ADULT - SUBJECTIVE AND OBJECTIVE BOX
[facility] -- Brittany Lutz, Reynaldo Ku Savella, , Gera Maya  Office # [phone]    Follow Up:      Subjective/Observations:     REVIEW OF SYSTEMS: All other review of systems is negative unless indicated above  PAST MEDICAL & SURGICAL HISTORY:  Diabetes  Diabetes mellitus with no complication  Afib  Hypertension  BPH (benign prostatic hyperplasia)  Perforated gastric ulcer  s/p emergent ex-lap omentopexy and plication 6/2019  Pulmonary embolism  History of non-ST elevation myocardial infarction (NSTEMI)  Osteomyelitis  s/p debridement  CAD S/P percutaneous coronary angioplasty  Cerebrovascular accident  H/O abdominal surgery  Perforated gastric ulcer  Traumatic amputation of left foot, initial encounter    MEDICATIONS  (STANDING):  apixaban 5 milliGRAM(s) Oral every 12 hours  ascorbic acid 500 milliGRAM(s) Oral daily  aspirin  chewable 81 milliGRAM(s) Oral daily  atorvastatin 40 milliGRAM(s) Oral at bedtime  bisacodyl 10 milliGRAM(s) Oral daily  cholecalciferol 1000 Unit(s) Oral daily  dextrose 5%. 1000 milliLiter(s) (100 mL/Hr) IV Continuous <Continuous>  dextrose 5%. 1000 milliLiter(s) (50 mL/Hr) IV Continuous <Continuous>  dextrose 50% Injectable 25 Gram(s) IV Push once  dextrose 50% Injectable 12.5 Gram(s) IV Push once  dextrose 50% Injectable 25 Gram(s) IV Push once  diltiazem    milliGRAM(s) Oral daily  ertapenem  IVPB 1000 milliGRAM(s) IV Intermittent every 24 hours  ferrous    sulfate 325 milliGRAM(s) Oral two times a day  furosemide    Tablet 40 milliGRAM(s) Oral two times a day  gabapentin 300 milliGRAM(s) Oral every 12 hours  glucagon  Injectable 1 milliGRAM(s) IntraMuscular once  insulin glargine Injectable (LANTUS) 15 Unit(s) SubCutaneous every morning  insulin glargine Injectable (LANTUS) 7 Unit(s) SubCutaneous at bedtime  insulin lispro (ADMELOG) corrective regimen sliding scale   SubCutaneous three times a day before meals  insulin lispro (ADMELOG) corrective regimen sliding scale   SubCutaneous at bedtime  insulin lispro Injectable (ADMELOG) 5 Unit(s) SubCutaneous three times a day before meals  loratadine 10 milliGRAM(s) Oral daily  melatonin 5 milliGRAM(s) Oral at bedtime  metoclopramide 5 milliGRAM(s) Oral three times a day  multivitamin/minerals 1 Tablet(s) Oral daily  naloxegol 25 milliGRAM(s) Oral daily  oxybutynin 5 milliGRAM(s) Oral two times a day  pantoprazole    Tablet 40 milliGRAM(s) Oral before breakfast  polyethylene glycol 3350 17 Gram(s) Oral two times a day  saccharomyces boulardii 250 milliGRAM(s) Oral two times a day  senna 2 Tablet(s) Oral at bedtime  sodium chloride 0.65% Nasal 1 Spray(s) Both Nostrils daily  sucralfate 1 Gram(s) Oral two times a day  tiotropium 2.5 MICROgram(s) Inhaler 2 Puff(s) Inhalation daily    MEDICATIONS  (PRN):  acetaminophen     Tablet .. 650 milliGRAM(s) Oral every 6 hours PRN Temp greater or equal to 38C (100.4F), Mild Pain (1 - 3)  albuterol    0.083% 2.5 milliGRAM(s) Nebulizer every 4 hours PRN Shortness of Breath and/or Wheezing  aluminum hydroxide/magnesium hydroxide/simethicone Suspension 30 milliLiter(s) Oral every 4 hours PRN Dyspepsia  dextrose Oral Gel 15 Gram(s) Oral once PRN Blood Glucose LESS THAN 70 milliGRAM(s)/deciliter  ondansetron Injectable 4 milliGRAM(s) IV Push every 6 hours PRN Nausea and/or Vomiting    Allergies    No Known Drug Allergies  fish (Hives)    Intolerances      Vital Signs Last 24 Hrs  T(C): 36.6 (25 Mar 2024 04:30), Max: 37.1 (24 Mar 2024 20:20)  T(F): 97.9 (25 Mar 2024 04:30), Max: 98.7 (24 Mar 2024 20:20)  HR: 81 (25 Mar 2024 04:30) (80 - 85)  BP: 135/81 (25 Mar 2024 04:30) (120/73 - 135/81)  BP(mean): --  RR: 18 (25 Mar 2024 04:30) (17 - 18)  SpO2: 93% (25 Mar 2024 04:30) (93% - 95%)    Parameters below as of 25 Mar 2024 04:30  Patient On (Oxygen Delivery Method): room air      I&O's Summary    24 Mar 2024 07:01  -  25 Mar 2024 07:00  --------------------------------------------------------  IN: 0 mL / OUT: 1700 mL / NET: -1700 mL      PHYSICAL EXAM:  TELE:   Constitutional: NAD, awake and alert, well-developed  HEENT: Moist Mucous Membranes, Anicteric  Pulmonary: Non-labored, breath sounds are clear bilaterally, No wheezing, rales or rhonchi  Cardiovascular: Regular, S1 and S2, No murmurs, rubs, gallops or clicks  Gastrointestinal: Bowel Sounds present, soft, nontender.   Lymph: No peripheral edema. No lymphadenopathy.  Skin: No visible rashes or ulcers.  Psych:  Mood & affect appropriate  LABS: All Labs Reviewed:    24 Mar 2024 07:45    145    |  105    |  21     ----------------------------<  191    3.3     |  32     |  1.10     Ca    8.7        24 Mar 2024 07:45  Mg     1.6       24 Mar 2024 07:45            12 Lead ECG:   Ventricular Rate 154 BPM    QRS Duration 76 ms    Q-T Interval 282 ms    QTC Calculation(Bazett) 451 ms    R Axis 15 degrees    T Axis 194 degrees    Diagnosis Line *** Critical Test Result: High HR  Atrial fibrillation with rapid ventricular response  Low voltage QRS  Nonspecific ST and T wave abnormality    Confirmed by JOHN KU ([id number]) on 3/17/2024 7:30:13 AM (03-16-24 @ 23:50)      TRANSTHORACIC ECHOCARDIOGRAM REPORT  ________________________________________________________________________________                                      _______       Pt. Name:       SARAH MORRISON Study Date:    3/18/2024  MRN:            [id number]         YOB: 1970  Accession #:    [id number]        Age:           53 years  Account#:       [id number]       Gender:        M  Heart Rate:                      Height:        74.02 in (188.00 cm)  Rhythm:                          Weight:        244.71 lb (111.00 kg)  Blood Pressure: 100/60 mmHg      BSA/BMI:       2.37 m² / 31.41 kg/m²  ________________________________________________________________________________________  Referring Physician:    [id number] Hill Brizuela  Interpreting Physician: Mary Maya MD  Primary Sonographer:    Mounika FELDMAN    CPT:               ECHO TTE WO CON COMP W DOPP - 59654.m  Indication(s):     Dyspnea, unspecified - R06.00  Procedure:         Transthoracic echocardiogram with 2-D, M-mode and complete                     spectral and color flow Doppler.  Ordering Location: [facility]  Admission Status:  Inpatient  Study Information: Image quality for this study is technically difficult.    _______________________________________________________________________________________     CONCLUSIONS:      1. Technically difficult image quality.   2. Left ventricular cavity is normal in size. Left ventricular wall thickness is normal. Left ventricular systolic function is normal with an ejection fraction visually estimated at 50 to 55 %.   3. Normal right ventricular cavity size, with normal wall thickness, and mildly reduced systolic function.   4. The left atrium is moderately dilated.   5. The right atrium is moderately dilated.   6. Moderate to severe mitral regurgitation.   7. Mild to moderate tricuspid regurgitation.   8. Estimated pulmonary artery systolic pressure is 36 mmHg, consistent with mild pulmonary hypertension.   9. The inferior vena cava is dilated measuring 2.40 cm in diameter, (dilated >2.1cm) with normal inspiratory collapse (normal >50%) consistent with mildly elevated right atrial pressure (~8, range 5-10mmHg).  10. Trace pericardial effusion.    ________________________________________________________________________________________  FINDINGS:     Left Ventricle:  The left ventricular cavity is normal in size. Left ventricular wall thickness is normal. Left ventricular systolic function is normal with an ejection fraction visually estimated at 50 to 55%.The left ventricular diastolic function is indeterminate.     Right Ventricle:  The right ventricular cavity is normal in size, with normal wall thickness and mildly reduced systolic function.     Left Atrium:  The left atrium is moderately dilated.     Right Atrium:  The right atrium is moderately dilated.     Aortic Valve:  The aortic valve anatomy cannot be determined with normal systolic excursion. There is no aortic valve stenosis.     Mitral Valve:  There is mild calcification of the mitralvalve annulus. There is moderate to severe mitral regurgitation.     Tricuspid Valve:  Structurally normal tricuspid valve with normal leaflet excursion. There is mild to moderate tricuspid regurgitation. Estimated pulmonary artery systolic pressure is 36 mmHg, consistent with mild pulmonary hypertension.     Pulmonic Valve:  The pulmonic valve was not well visualized. There is trace pulmonic regurgitation.     Pericardium:  There is a trace pericardial effusion.     Systemic Veins:  The inferiorvena cava is dilated measuring 2.40 cm in diameter, (dilated >2.1cm) with normal inspiratory collapse (normal >50%) consistent with mildly elevated right atrial pressure (~8, range 5-10mmHg).  ____________________________________________________________________  QUANTITATIVE DATA:  Left Ventricle Measurements: (Indexed to BSA)     IVSd (2D):   1.9 cm  LVPWd (2D):  1.5 cm  LVIDd (2D):  5.3 cm  LVIDs (2D):  3.9 cm  LV Mass:     413 g  174.4 g/m²  Visualized LV EF%: 50 to 55%     MV E Vmax:    1.15 m/s  e' lateral:   10.90 cm/s  e' medial:    10.50 cm/s  E/e' lateral: 10.56  E/e' medial:  12.00  E/e' Average: 10.76  MV DT:        137 msec    Aorta Measurements: (Normal range) (Indexed to BSA)     Sinuses of Valsalva: 3.50 cm (3.1 - 3.7 cm)    Left Atrium Measurements: (Indexed to BSA)  LA Diam 2D: 3.99 cm    LVOT / RVOT/ Qp/Qs Data: (Indexed to BSA)  LVOT Diameter: 2.22 cm    Mitral Valve Measurements:     MV E Vmax: 1.2 m/s    Tricuspid Valve Measurements:     TR Vmax:          2.6m/s  TR Peak Gradient: 27.7 mmHg  RA Pressure:      8 mmHg  PASP:             36 mmHg    ________________________________________________________________________________________  Electronically signed on 3/19/2024 at 11:27:59 AM by Mary Maya MD         *** Final ***      [facility] -- Brittany Lutz, Reynaldo Ku Savella, , Gera Maya  Office # [phone]    Follow Up:  Afib with RVR    Subjective/Observations: Sound asleep on RA.  Not on any form of respiratory discomfort.  Awakened by name-calling.  No complaints.  No significant tele events.  No pauses noted    REVIEW OF SYSTEMS: All other review of systems is negative unless indicated above  PAST MEDICAL & SURGICAL HISTORY:  Diabetes  Diabetes mellitus with no complication  Afib  Hypertension  BPH (benign prostatic hyperplasia)  Perforated gastric ulcer  s/p emergent ex-lap omentopexy and plication 6/2019  Pulmonary embolism  History of non-ST elevation myocardial infarction (NSTEMI)  Osteomyelitis  s/p debridement  CAD S/P percutaneous coronary angioplasty  Cerebrovascular accident  H/O abdominal surgery  Perforated gastric ulcer  Traumatic amputation of left foot, initial encounter    MEDICATIONS  (STANDING):  apixaban 5 milliGRAM(s) Oral every 12 hours  ascorbic acid 500 milliGRAM(s) Oral daily  aspirin  chewable 81 milliGRAM(s) Oral daily  atorvastatin 40 milliGRAM(s) Oral at bedtime  bisacodyl 10 milliGRAM(s) Oral daily  cholecalciferol 1000 Unit(s) Oral daily  dextrose 5%. 1000 milliLiter(s) (100 mL/Hr) IV Continuous <Continuous>  dextrose 5%. 1000 milliLiter(s) (50 mL/Hr) IV Continuous <Continuous>  dextrose 50% Injectable 25 Gram(s) IV Push once  dextrose 50% Injectable 12.5 Gram(s) IV Push once  dextrose 50% Injectable 25 Gram(s) IV Push once  diltiazem    milliGRAM(s) Oral daily  ertapenem  IVPB 1000 milliGRAM(s) IV Intermittent every 24 hours  ferrous    sulfate 325 milliGRAM(s) Oral two times a day  furosemide    Tablet 40 milliGRAM(s) Oral two times a day  gabapentin 300 milliGRAM(s) Oral every 12 hours  glucagon  Injectable 1 milliGRAM(s) IntraMuscular once  insulin glargine Injectable (LANTUS) 15 Unit(s) SubCutaneous every morning  insulin glargine Injectable (LANTUS) 7 Unit(s) SubCutaneous at bedtime  insulin lispro (ADMELOG) corrective regimen sliding scale   SubCutaneous three times a day before meals  insulin lispro (ADMELOG) corrective regimen sliding scale   SubCutaneous at bedtime  insulin lispro Injectable (ADMELOG) 5 Unit(s) SubCutaneous three times a day before meals  loratadine 10 milliGRAM(s) Oral daily  melatonin 5 milliGRAM(s) Oral at bedtime  metoclopramide 5 milliGRAM(s) Oral three times a day  multivitamin/minerals 1 Tablet(s) Oral daily  naloxegol 25 milliGRAM(s) Oral daily  oxybutynin 5 milliGRAM(s) Oral two times a day  pantoprazole    Tablet 40 milliGRAM(s) Oral before breakfast  polyethylene glycol 3350 17 Gram(s) Oral two times a day  saccharomyces boulardii 250 milliGRAM(s) Oral two times a day  senna 2 Tablet(s) Oral at bedtime  sodium chloride 0.65% Nasal 1 Spray(s) Both Nostrils daily  sucralfate 1 Gram(s) Oral two times a day  tiotropium 2.5 MICROgram(s) Inhaler 2 Puff(s) Inhalation daily    MEDICATIONS  (PRN):  acetaminophen     Tablet .. 650 milliGRAM(s) Oral every 6 hours PRN Temp greater or equal to 38C (100.4F), Mild Pain (1 - 3)  albuterol    0.083% 2.5 milliGRAM(s) Nebulizer every 4 hours PRN Shortness of Breath and/or Wheezing  aluminum hydroxide/magnesium hydroxide/simethicone Suspension 30 milliLiter(s) Oral every 4 hours PRN Dyspepsia  dextrose Oral Gel 15 Gram(s) Oral once PRN Blood Glucose LESS THAN 70 milliGRAM(s)/deciliter  ondansetron Injectable 4 milliGRAM(s) IV Push every 6 hours PRN Nausea and/or Vomiting    Allergies    No Known Drug Allergies  fish (Hives)    Intolerances    Vital Signs Last 24 Hrs  T(C): 36.6 (25 Mar 2024 04:30), Max: 37.1 (24 Mar 2024 20:20)  T(F): 97.9 (25 Mar 2024 04:30), Max: 98.7 (24 Mar 2024 20:20)  HR: 81 (25 Mar 2024 04:30) (80 - 85)  BP: 135/81 (25 Mar 2024 04:30) (120/73 - 135/81)  BP(mean): --  RR: 18 (25 Mar 2024 04:30) (17 - 18)  SpO2: 93% (25 Mar 2024 04:30) (93% - 95%)    Parameters below as of 25 Mar 2024 04:30  Patient On (Oxygen Delivery Method): room air    I&O's Summary    24 Mar 2024 07:01  -  25 Mar 2024 07:00  --------------------------------------------------------  IN: 0 mL / OUT: 1700 mL / NET: -1700 mL      PHYSICAL EXAM:  TELE: Afib  Constitutional: NAD, asleep but arousable  HEENT: Moist Mucous Membranes, Anicteric  Pulmonary: Non-labored, breath sounds are diminished bilaterally, No wheezing, no rales no rhonchi  Cardiovascular: IRRR, S1 and S2, +murmurs, no rubs, gallops or clicks  Gastrointestinal: Bowel Sounds present, soft, nontender.   Lymph: No peripheral edema. No lymphadenopathy.  Skin: No visible rashes.  Right heel ulcer with dressing dry and intact  Psych:  Mood & affect appropriate    LABS: All Labs Reviewed:    24 Mar 2024 07:45    145    |  105    |  21     ----------------------------<  191    3.3     |  32     |  1.10     Ca    8.7        24 Mar 2024 07:45  Mg     1.6       24 Mar 2024 07:45    12 Lead ECG:   Ventricular Rate 154 BPM    QRS Duration 76 ms    Q-T Interval 282 ms    QTC Calculation(Bazett) 451 ms    R Axis 15 degrees    T Axis 194 degrees    Diagnosis Line *** Critical Test Result: High HR  Atrial fibrillation with rapid ventricular response  Low voltage QRS  Nonspecific ST and T wave abnormality    Confirmed by JOHN KU ([id number]) on 3/17/2024 7:30:13 AM (03-16-24 @ 23:50)      TRANSTHORACIC ECHOCARDIOGRAM REPORT  ________________________________________________________________________________                                      _______       Pt. Name:       SARAH MORRISON Study Date:    3/18/2024  MRN:            [id number]         YOB: 1970  Accession #:    [id number]        Age:           53 years  Account#:       [id number]       Gender:        M  Heart Rate:                      Height:        74.02 in (188.00 cm)  Rhythm:                          Weight:        244.71 lb (111.00 kg)  Blood Pressure: 100/60 mmHg      BSA/BMI:       2.37 m² / 31.41 kg/m²  ________________________________________________________________________________________  Referring Physician:    [id number] Hill Brizuela  Interpreting Physician: Mary Maya MD  Primary Sonographer:    Mounika FELDMAN    CPT:               ECHO TTE WO CON COMP W DOPP - 02365.m  Indication(s):     Dyspnea, unspecified - R06.00  Procedure:         Transthoracic echocardiogram with 2-D, M-mode and complete                     spectral and color flow Doppler.  Ordering Location: [facility]  Admission Status:  Inpatient  Study Information: Image quality for this study is technically difficult.    _______________________________________________________________________________________     CONCLUSIONS:      1. Technically difficult image quality.   2. Left ventricular cavity is normal in size. Left ventricular wall thickness is normal. Left ventricular systolic function is normal with an ejection fraction visually estimated at 50 to 55 %.   3. Normal right ventricular cavity size, with normal wall thickness, and mildly reduced systolic function.   4. The left atrium is moderately dilated.   5. The right atrium is moderately dilated.   6. Moderate to severe mitral regurgitation.   7. Mild to moderate tricuspid regurgitation.   8. Estimated pulmonary artery systolic pressure is 36 mmHg, consistent with mild pulmonary hypertension.   9. The inferior vena cava is dilated measuring 2.40 cm in diameter, (dilated >2.1cm) with normal inspiratory collapse (normal >50%) consistent with mildly elevated right atrial pressure (~8, range 5-10mmHg).  10. Trace pericardial effusion.    ________________________________________________________________________________________  FINDINGS:     Left Ventricle:  The left ventricular cavity is normal in size. Left ventricular wall thickness is normal. Left ventricular systolic function is normal with an ejection fraction visually estimated at 50 to 55%.The left ventricular diastolic function is indeterminate.     Right Ventricle:  The right ventricular cavity is normal in size, with normal wall thickness and mildly reduced systolic function.     Left Atrium:  The left atrium is moderately dilated.     Right Atrium:  The right atrium is moderately dilated.     Aortic Valve:  The aortic valve anatomy cannot be determined with normal systolic excursion. There is no aortic valve stenosis.     Mitral Valve:  There is mild calcification of the mitralvalve annulus. There is moderate to severe mitral regurgitation.     Tricuspid Valve:  Structurally normal tricuspid valve with normal leaflet excursion. There is mild to moderate tricuspid regurgitation. Estimated pulmonary artery systolic pressure is 36 mmHg, consistent with mild pulmonary hypertension.     Pulmonic Valve:  The pulmonic valve was not well visualized. There is trace pulmonic regurgitation.     Pericardium:  There is a trace pericardial effusion.     Systemic Veins:  The inferiorvena cava is dilated measuring 2.40 cm in diameter, (dilated >2.1cm) with normal inspiratory collapse (normal >50%) consistent with mildly elevated right atrial pressure (~8, range 5-10mmHg).  ____________________________________________________________________  QUANTITATIVE DATA:  Left Ventricle Measurements: (Indexed to BSA)     IVSd (2D):   1.9 cm  LVPWd (2D):  1.5 cm  LVIDd (2D):  5.3 cm  LVIDs (2D):  3.9 cm  LV Mass:     413 g  174.4 g/m²  Visualized LV EF%: 50 to 55%     MV E Vmax:    1.15 m/s  e' lateral:   10.90 cm/s  e' medial:    10.50 cm/s  E/e' lateral: 10.56  E/e' medial:  12.00  E/e' Average: 10.76  MV DT:        137 msec    Aorta Measurements: (Normal range) (Indexed to BSA)     Sinuses of Valsalva: 3.50 cm (3.1 - 3.7 cm)    Left Atrium Measurements: (Indexed to BSA)  LA Diam 2D: 3.99 cm    LVOT / RVOT/ Qp/Qs Data: (Indexed to BSA)  LVOT Diameter: 2.22 cm    Mitral Valve Measurements:     MV E Vmax: 1.2 m/s    Tricuspid Valve Measurements:     TR Vmax:          2.6m/s  TR Peak Gradient: 27.7 mmHg  RA Pressure:      8 mmHg  PASP:             36 mmHg  _______________________________________________________________________________________  Electronically signed on 3/19/2024 at 11:27:59 AM by Mary Maya MD    *** Final ***

## 2024-03-25 NOTE — PROGRESS NOTE ADULT - SUBJECTIVE AND OBJECTIVE BOX
[facility] DIVISION of INFECTIOUS DISEASE  Juventino Whitten MD PhD, Symone Hickey MD, Anne-Marie Li MD, Jeffery Jacobs MD, Ryan Romeo MD  and providing coverage with Melody Armstrong MD  Providing Infectious Disease Consultations at [facility], [facility], [facility]'s    Office# [phone] to schedule follow up appointments  Answering Service for urgent calls or New Consults [phone]  Cell# to text for urgent issues Juventino Whitten [phone]     infectious diseases progress note:    SARAH MORRISON is a 53y y. o. Male patient    Overnight and events of the last 24hrs reviewed    Allergies    No Known Drug Allergies  fish (Hives)    Intolerances        ANTIBIOTICS/RELEVANT:  antimicrobials  ertapenem  IVPB 1000 milliGRAM(s) IV Intermittent every 24 hours    immunologic:    OTHER:  acetaminophen     Tablet .. 650 milliGRAM(s) Oral every 6 hours PRN  albuterol    0.083% 2.5 milliGRAM(s) Nebulizer every 4 hours PRN  aluminum hydroxide/magnesium hydroxide/simethicone Suspension 30 milliLiter(s) Oral every 4 hours PRN  ascorbic acid 500 milliGRAM(s) Oral daily  atorvastatin 40 milliGRAM(s) Oral at bedtime  bisacodyl 10 milliGRAM(s) Oral daily  cholecalciferol 1000 Unit(s) Oral daily  dextrose 5%. 1000 milliLiter(s) IV Continuous <Continuous>  dextrose 5%. 1000 milliLiter(s) IV Continuous <Continuous>  dextrose 50% Injectable 25 Gram(s) IV Push once  dextrose 50% Injectable 12.5 Gram(s) IV Push once  dextrose 50% Injectable 25 Gram(s) IV Push once  dextrose Oral Gel 15 Gram(s) Oral once PRN  ferrous    sulfate 325 milliGRAM(s) Oral two times a day  furosemide    Tablet 40 milliGRAM(s) Oral two times a day  gabapentin 300 milliGRAM(s) Oral every 12 hours  glucagon  Injectable 1 milliGRAM(s) IntraMuscular once  insulin glargine Injectable (LANTUS) 15 Unit(s) SubCutaneous every morning  insulin glargine Injectable (LANTUS) 7 Unit(s) SubCutaneous at bedtime  insulin lispro (ADMELOG) corrective regimen sliding scale   SubCutaneous three times a day before meals  insulin lispro (ADMELOG) corrective regimen sliding scale   SubCutaneous at bedtime  insulin lispro Injectable (ADMELOG) 5 Unit(s) SubCutaneous three times a day before meals  loratadine 10 milliGRAM(s) Oral daily  melatonin 5 milliGRAM(s) Oral at bedtime  metoclopramide 5 milliGRAM(s) Oral three times a day  multivitamin/minerals 1 Tablet(s) Oral daily  naloxegol 25 milliGRAM(s) Oral daily  ondansetron Injectable 4 milliGRAM(s) IV Push every 6 hours PRN  oxybutynin 5 milliGRAM(s) Oral two times a day  oxyCODONE    IR 10 milliGRAM(s) Oral two times a day  pantoprazole    Tablet 40 milliGRAM(s) Oral before breakfast  polyethylene glycol 3350 17 Gram(s) Oral two times a day  saccharomyces boulardii 250 milliGRAM(s) Oral two times a day  senna 2 Tablet(s) Oral at bedtime  sodium chloride 0.65% Nasal 1 Spray(s) Both Nostrils daily  sucralfate 1 Gram(s) Oral two times a day  tiotropium 2.5 MICROgram(s) Inhaler 2 Puff(s) Inhalation daily      Objective:  Vital Signs Last 24 Hrs  T(C): 36.9 (25 Mar 2024 12:02), Max: 37.1 (24 Mar 2024 20:20)  T(F): 98.5 (25 Mar 2024 12:02), Max: 98.7 (24 Mar 2024 20:20)  HR: 88 (25 Mar 2024 12:02) (81 - 88)  BP: 121/69 (25 Mar 2024 12:02) (121/69 - 135/81)  BP(mean): --  RR: 18 (25 Mar 2024 12:02) (17 - 18)  SpO2: 92% (25 Mar 2024 12:02) (92% - 95%)    Parameters below as of 25 Mar 2024 12:02  Patient On (Oxygen Delivery Method): room air        T(C): 36.9 (03-25-24 @ 12:02), Max: 37.3 (03-23-24 @ 20:40)  T(C): 36.9 (03-25-24 @ 12:02), Max: 37.3 (03-22-24 @ 17:45)  T(C): 36.9 (03-25-24 @ 12:02), Max: 37.3 (03-22-24 @ 17:45)    PHYSICAL EXAM:  HEENT: NC atraumatic  Neck: supple  Respiratory: no accessory muscle use, breathing comfortably  Cardiovascular: distant  Gastrointestinal: normal appearing, nondistended  Extremities: no clubbing, no cyanosis, left foot wrapped      LABS:                          13.3   10.44 )-----------( 300      ( 25 Mar 2024 07:55 )             41.5       WBC  10.44 03-25 @ 07:55  11.25 03-22 @ 05:48  7.98 03-20 @ 06:15  8.96 03-19 @ 08:08      03-25    145  |  107  |  18  ----------------------------<  112<H>  3.3<L>   |  30  |  1.10    Ca    8.7      25 Mar 2024 07:55  Mg     1.9     03-25        Creatinine: 1.10 mg/dL (03-25-24 @ 07:55)  Creatinine: 1.10 mg/dL (03-24-24 @ 07:45)  Creatinine: 0.99 mg/dL (03-22-24 @ 05:48)  Creatinine: 1.10 mg/dL (03-21-24 @ 08:27)  Creatinine: 1.20 mg/dL (03-20-24 @ 06:15)  Creatinine: 1.20 mg/dL (03-19-24 @ 08:08)        Urinalysis Basic - ( 25 Mar 2024 07:55 )    Color: x / Appearance: x / SG: x / pH: x  Gluc: 112 mg/dL / Ketone: x  / Bili: x / Urobili: x   Blood: x / Protein: x / Nitrite: x   Leuk Esterase: x / RBC: x / WBC x   Sq Epi: x / Non Sq Epi: x / Bacteria: x            INFLAMMATORY MARKERS      MICROBIOLOGY:              RADIOLOGY & ADDITIONAL STUDIES:

## 2024-03-25 NOTE — PROGRESS NOTE ADULT - PROBLEM SELECTOR PLAN 3
Continue iv abx and wound care  Unable to have MRI b/c of brain aneurysm clips  Will have R heel debridement and bone biopsy on 3/26  MAY PROCEED TO O.R. FOR PLANNED PROCEDURE  Podiatry f/u

## 2024-03-25 NOTE — PROGRESS NOTE ADULT - SUBJECTIVE AND OBJECTIVE BOX
Date of Service: 03-25-24 @ 11:46    Patient is a 53y old  Male who presents with a chief complaint of fever (24 Mar 2024 08:46)      INTERVAL HPI/OVERNIGHT EVENTS: Patient seen and examined. NAD. No complaints.    Vital Signs Last 24 Hrs  T(C): 36.6 (25 Mar 2024 04:30), Max: 37.1 (24 Mar 2024 20:20)  T(F): 97.9 (25 Mar 2024 04:30), Max: 98.7 (24 Mar 2024 20:20)  HR: 81 (25 Mar 2024 04:30) (80 - 85)  BP: 135/81 (25 Mar 2024 04:30) (120/73 - 135/81)  BP(mean): --  RR: 18 (25 Mar 2024 04:30) (17 - 18)  SpO2: 93% (25 Mar 2024 04:30) (93% - 95%)    Parameters below as of 25 Mar 2024 04:30  Patient On (Oxygen Delivery Method): room air        03-25    145  |  107  |  18  ----------------------------<  112<H>  3.3<L>   |  30  |  1.10    Ca    8.7      25 Mar 2024 07:55  Mg     1.9     03-25                            13.3   10.44 )-----------( 300      ( 25 Mar 2024 07:55 )             41.5       CAPILLARY BLOOD GLUCOSE      POCT Blood Glucose.: 117 mg/dL (25 Mar 2024 08:10)  POCT Blood Glucose.: 118 mg/dL (24 Mar 2024 21:57)  POCT Blood Glucose.: 142 mg/dL (24 Mar 2024 20:01)  POCT Blood Glucose.: 139 mg/dL (24 Mar 2024 16:35)  POCT Blood Glucose.: 162 mg/dL (24 Mar 2024 12:20)    Urinalysis Basic - ( 25 Mar 2024 07:55 )    Color: x / Appearance: x / SG: x / pH: x  Gluc: 112 mg/dL / Ketone: x  / Bili: x / Urobili: x   Blood: x / Protein: x / Nitrite: x   Leuk Esterase: x / RBC: x / WBC x   Sq Epi: x / Non Sq Epi: x / Bacteria: x              acetaminophen     Tablet .. 650 milliGRAM(s) Oral every 6 hours PRN  albuterol    0.083% 2.5 milliGRAM(s) Nebulizer every 4 hours PRN  aluminum hydroxide/magnesium hydroxide/simethicone Suspension 30 milliLiter(s) Oral every 4 hours PRN  apixaban 5 milliGRAM(s) Oral every 12 hours  ascorbic acid 500 milliGRAM(s) Oral daily  aspirin  chewable 81 milliGRAM(s) Oral daily  atorvastatin 40 milliGRAM(s) Oral at bedtime  bisacodyl 10 milliGRAM(s) Oral daily  cholecalciferol 1000 Unit(s) Oral daily  dextrose 5%. 1000 milliLiter(s) IV Continuous <Continuous>  dextrose 5%. 1000 milliLiter(s) IV Continuous <Continuous>  dextrose 50% Injectable 25 Gram(s) IV Push once  dextrose 50% Injectable 12.5 Gram(s) IV Push once  dextrose 50% Injectable 25 Gram(s) IV Push once  dextrose Oral Gel 15 Gram(s) Oral once PRN  ertapenem  IVPB 1000 milliGRAM(s) IV Intermittent every 24 hours  ferrous    sulfate 325 milliGRAM(s) Oral two times a day  furosemide    Tablet 40 milliGRAM(s) Oral two times a day  gabapentin 300 milliGRAM(s) Oral every 12 hours  glucagon  Injectable 1 milliGRAM(s) IntraMuscular once  insulin glargine Injectable (LANTUS) 15 Unit(s) SubCutaneous every morning  insulin glargine Injectable (LANTUS) 7 Unit(s) SubCutaneous at bedtime  insulin lispro (ADMELOG) corrective regimen sliding scale   SubCutaneous three times a day before meals  insulin lispro (ADMELOG) corrective regimen sliding scale   SubCutaneous at bedtime  insulin lispro Injectable (ADMELOG) 5 Unit(s) SubCutaneous three times a day before meals  loratadine 10 milliGRAM(s) Oral daily  melatonin 5 milliGRAM(s) Oral at bedtime  metoclopramide 5 milliGRAM(s) Oral three times a day  multivitamin/minerals 1 Tablet(s) Oral daily  naloxegol 25 milliGRAM(s) Oral daily  ondansetron Injectable 4 milliGRAM(s) IV Push every 6 hours PRN  oxybutynin 5 milliGRAM(s) Oral two times a day  oxyCODONE    IR 10 milliGRAM(s) Oral two times a day  pantoprazole    Tablet 40 milliGRAM(s) Oral before breakfast  polyethylene glycol 3350 17 Gram(s) Oral two times a day  saccharomyces boulardii 250 milliGRAM(s) Oral two times a day  senna 2 Tablet(s) Oral at bedtime  sodium chloride 0.65% Nasal 1 Spray(s) Both Nostrils daily  sucralfate 1 Gram(s) Oral two times a day  tiotropium 2.5 MICROgram(s) Inhaler 2 Puff(s) Inhalation daily              REVIEW OF SYSTEMS:  CONSTITUTIONAL: No fever, no weight loss, or no fatigue  NECK: No pain, no stiffness  RESPIRATORY: No cough, no wheezing, no chills, no hemoptysis, No shortness of breath  CARDIOVASCULAR: No chest pain, no palpitations, no dizziness, no leg swelling  GASTROINTESTINAL: No abdominal pain. No nausea, no vomiting, no hematemesis; No diarrhea, no constipation. No melena, no hematochezia.  GENITOURINARY: No dysuria, no frequency, no hematuria, no incontinence  NEUROLOGICAL: No headaches, no loss of strength, no numbness, no tremors  SKIN: No itching, no burning  MUSCULOSKELETAL: No joint pain, no swelling; No muscle, no back, no extremity pain  PSYCHIATRIC: No depression, no mood swings,   HEME/LYMPH: No easy bruising, no bleeding gums  ALLERY AND IMMUNOLOGIC: No hives       Consultant(s) Notes Reviewed:  [X] YES  [ ] NO    PHYSICAL EXAM:  GENERAL: NAD  HEAD:  Atraumatic, Normocephalic  EYES: EOMI, PERRLA, conjunctiva and sclera clear  ENMT: No tonsillar erythema, exudates, or enlargement; Moist mucous membranes  NECK: Supple, No JVD  NERVOUS SYSTEM:  Awake & alert  CHEST/LUNG: Clear to auscultation bilaterally; No rales, rhonchi, wheezing,  HEART: Regular rate and rhythm  ABDOMEN: Soft, Nontender, Nondistended; Bowel sounds present  EXTREMITIES:  No clubbing, cyanosis, or edema  LYMPH: No lymphadenopathy noted  SKIN: No rashes      Advanced care planning discussed with patient/family [X] YES   [ ] NO    Advanced care planning discussed with patient/family. Patient's health status was discussed. All appropriate changes have been made regarding patient's end-of-life care. Advanced care planning forms reviewed/discussed/completed.  20 minutes spent.

## 2024-03-25 NOTE — PROGRESS NOTE ADULT - ASSESSMENT
53M with PMH of AF on Eliquis, indwelling Aguilera CAD s/p stents, CVA, DM, HTN, osteomyelitis s/p debridement, PE, sent from [facility] for elevated BP, SOB, and dislodged Aguilera, found to be febrile in ER, a/w sepsis and A fib RVR.     Sepsis, Afib RVR, CAD, HTN  - s/p Dig load and Cardizem gtt.  Dig level = 1.5.  Keep off Dig, not a home med.   - Had a 3.4 sec pause, 3/22.  BB D/C'd.  No further pauses noted.  Rates range   - Increase Cardizem CD to 360 mg daily  - Continue Eliquis for AC    - BNP 7622.  Now euvolemic, tolerating RA.  CxR, 3/23, showed mild Lt and trace Rt pleural effusions  - Continue PO Lasix   - TTE (12/2023) normal LV & RV size and function EF 55-60%, indeterminate DD, mod dilated LA and RA, mod MR, mild TR, PASP 31  - Technically difficult ECHO w/ normal LV size and function EF 50-55%, mildly reduced RV systolic function, mod dilated LA and RA, mod to sev MR, mild to mod TR, PASP 36  - Creatinine remains normal.  Hypernatremia resolved    - EKG: A fib RVR @ 154  - Troponin: <-51, no need to trend  - No evidence of any active ischemia   - Continue aspirin and statin     - BP stable and controlled.  No further hypotensive episode  - Continue to hold home Imdur and Losartan to allow room for AV nodals     - Monitor and replete lytes, keep K>4, Mg>2.  - Will continue to follow.    Henrietta Kim DNP, NP-C, AGACNP-C  Cardiology   Call TEAMS

## 2024-03-25 NOTE — PROGRESS NOTE ADULT - SUBJECTIVE AND OBJECTIVE BOX
Date/Time Patient Seen:  		  Referring MD:   Data Reviewed	       Patient is a 53y old  Male who presents with a chief complaint of fever (24 Mar 2024 08:46)      Subjective/HPI     PAST MEDICAL & SURGICAL HISTORY:  No pertinent past medical history    Diabetes    No pertinent past medical history    Diabetes mellitus with no complication    Afib    Hypertension    BPH (benign prostatic hyperplasia)    Perforated gastric ulcer  s/p emergent ex-lap omentopexy and plication 6/2019    Pulmonary embolism    History of non-ST elevation myocardial infarction (NSTEMI)    Osteomyelitis  s/p debridement    CAD S/P percutaneous coronary angioplasty    Cerebrovascular accident    No significant past surgical history    No significant past surgical history    H/O abdominal surgery    Perforated gastric ulcer    Traumatic amputation of left foot, initial encounter          Medication list         MEDICATIONS  (STANDING):  apixaban 5 milliGRAM(s) Oral every 12 hours  ascorbic acid 500 milliGRAM(s) Oral daily  aspirin  chewable 81 milliGRAM(s) Oral daily  atorvastatin 40 milliGRAM(s) Oral at bedtime  bisacodyl 10 milliGRAM(s) Oral daily  cholecalciferol 1000 Unit(s) Oral daily  dextrose 5%. 1000 milliLiter(s) (100 mL/Hr) IV Continuous <Continuous>  dextrose 5%. 1000 milliLiter(s) (50 mL/Hr) IV Continuous <Continuous>  dextrose 50% Injectable 25 Gram(s) IV Push once  dextrose 50% Injectable 12.5 Gram(s) IV Push once  dextrose 50% Injectable 25 Gram(s) IV Push once  diltiazem    milliGRAM(s) Oral daily  ertapenem  IVPB 1000 milliGRAM(s) IV Intermittent every 24 hours  ferrous    sulfate 325 milliGRAM(s) Oral two times a day  furosemide    Tablet 40 milliGRAM(s) Oral two times a day  gabapentin 300 milliGRAM(s) Oral every 12 hours  glucagon  Injectable 1 milliGRAM(s) IntraMuscular once  insulin glargine Injectable (LANTUS) 15 Unit(s) SubCutaneous every morning  insulin glargine Injectable (LANTUS) 7 Unit(s) SubCutaneous at bedtime  insulin lispro (ADMELOG) corrective regimen sliding scale   SubCutaneous three times a day before meals  insulin lispro (ADMELOG) corrective regimen sliding scale   SubCutaneous at bedtime  insulin lispro Injectable (ADMELOG) 5 Unit(s) SubCutaneous three times a day before meals  loratadine 10 milliGRAM(s) Oral daily  melatonin 5 milliGRAM(s) Oral at bedtime  metoclopramide 5 milliGRAM(s) Oral three times a day  multivitamin/minerals 1 Tablet(s) Oral daily  naloxegol 25 milliGRAM(s) Oral daily  oxybutynin 5 milliGRAM(s) Oral two times a day  pantoprazole    Tablet 40 milliGRAM(s) Oral before breakfast  polyethylene glycol 3350 17 Gram(s) Oral two times a day  saccharomyces boulardii 250 milliGRAM(s) Oral two times a day  senna 2 Tablet(s) Oral at bedtime  sodium chloride 0.65% Nasal 1 Spray(s) Both Nostrils daily  sucralfate 1 Gram(s) Oral two times a day  tiotropium 2.5 MICROgram(s) Inhaler 2 Puff(s) Inhalation daily    MEDICATIONS  (PRN):  acetaminophen     Tablet .. 650 milliGRAM(s) Oral every 6 hours PRN Temp greater or equal to 38C (100.4F), Mild Pain (1 - 3)  albuterol    0.083% 2.5 milliGRAM(s) Nebulizer every 4 hours PRN Shortness of Breath and/or Wheezing  aluminum hydroxide/magnesium hydroxide/simethicone Suspension 30 milliLiter(s) Oral every 4 hours PRN Dyspepsia  dextrose Oral Gel 15 Gram(s) Oral once PRN Blood Glucose LESS THAN 70 milliGRAM(s)/deciliter  ondansetron Injectable 4 milliGRAM(s) IV Push every 6 hours PRN Nausea and/or Vomiting         Vitals log        ICU Vital Signs Last 24 Hrs  T(C): 36.6 (25 Mar 2024 04:30), Max: 37.1 (24 Mar 2024 20:20)  T(F): 97.9 (25 Mar 2024 04:30), Max: 98.7 (24 Mar 2024 20:20)  HR: 81 (25 Mar 2024 04:30) (80 - 85)  BP: 135/81 (25 Mar 2024 04:30) (120/73 - 135/81)  BP(mean): --  ABP: --  ABP(mean): --  RR: 18 (25 Mar 2024 04:30) (17 - 18)  SpO2: 93% (25 Mar 2024 04:30) (93% - 95%)    O2 Parameters below as of 25 Mar 2024 04:30  Patient On (Oxygen Delivery Method): room air                 Input and Output:  I&O's Detail    23 Mar 2024 07:01  -  24 Mar 2024 07:00  --------------------------------------------------------  IN:    Oral Fluid: 840 mL  Total IN: 840 mL    OUT:    Indwelling Catheter - Urethral (mL): 3000 mL  Total OUT: 3000 mL    Total NET: -2160 mL          Lab Data    03-24    145  |  105  |  21  ----------------------------<  191<H>  3.3<L>   |  32<H>  |  1.10    Ca    8.7      24 Mar 2024 07:45  Mg     1.6     03-24              Review of Systems	      Objective     Physical Examination    heart s1s2  lung dc BS      Pertinent Lab findings & Imaging      Ale:  NO   Adequate UO     I&O's Detail    23 Mar 2024 07:01  -  24 Mar 2024 07:00  --------------------------------------------------------  IN:    Oral Fluid: 840 mL  Total IN: 840 mL    OUT:    Indwelling Catheter - Urethral (mL): 3000 mL  Total OUT: 3000 mL    Total NET: -2160 mL               Discussed with:     Cultures:	        Radiology

## 2024-03-25 NOTE — PROGRESS NOTE ADULT - ASSESSMENT
53M with PMH of AF on Eliquis, indwelling jacques, CAD s/p stents, CVA, DM, HTN, osteomyelitis s/p debridement, PE, sent from [facility] for elevated BP, SOB, and dislodged jacques    AF  Emphysema  Atelectasis  CAD  CVA  DM  HTN  OP  OA  hx of OM  hx of PE    cvs rx regimen optimization and cardio f/u  Vascular and ENDO eval noted - DM foot ulcer  s/p Midline insertion - ABX  on LASIX    ct neg for pe - atelectasis - emphysema  spiriva - nebs prn - VBG  cardio eval in progress  cvs rx regimen  proBNP noted to be elevated  diuresis - as per cardio recs  TTE reviewed  AF management  rate and rhythm control  GERARDO eval as outpatient  monitored unit admission  pulse ox monitoring  goal sat > 88 pct  DM care  serial FS  OLD records reviewed

## 2024-03-25 NOTE — PROGRESS NOTE ADULT - ASSESSMENT
52 y/o M PMhx AF on Eliquis, chronic jacques, CAD s/p stents, CVA, DM, HTN, PE who presented w/ SOB, and dislodged jacques as well as dysuria.  also found to have Afib w/ RVR  admitted with  concerns for sepsis, CAUTI, dislodged jacques  reported dysuria  jacques placed in ED  CT abd/pelvis- No hydronephrosis or nephrolithiasis.    Recommendations  1-Bacteremia ESBL E coli (suspected pyelonephritis)   -  Escherichia coli: Detec   -  ESBL: Detec   -  CTX-M Resistance Marker: Detec  Culture - Urine (03.16.24 @ 22:35)  >100,000 CFU/ml Escherichia coli ESBL  Repeat blood cultures collected 3/18 5am - NGTD past 48 hours  ertapenem started 3/17 with last day 3/26   midline and ertapenem x 10 days can be completed in outpt setting  no labs required and midline can be removed at end of Rx    2-Foot wound open area on pressure location, now dressed, podiatry involved for this unstageable ulcer with rec  Xray-no evidence of osteo  MRI-not done with aneurysm clips so plan for biopsy 3/26 with recs to follow based on results  wound care    Thank you for consulting us and involving us in the management of this most interesting and challenging case.  We will follow along in the care of this patient. Please call us at [phone] or text me directly on my cell# at [phone] with any concerns.

## 2024-03-26 LAB
ANION GAP SERPL CALC-SCNC: 7 MMOL/L — SIGNIFICANT CHANGE UP (ref 5–17)
BUN SERPL-MCNC: 19 MG/DL — SIGNIFICANT CHANGE UP (ref 7–23)
CALCIUM SERPL-MCNC: 8.8 MG/DL — SIGNIFICANT CHANGE UP (ref 8.5–10.1)
CHLORIDE SERPL-SCNC: 104 MMOL/L — SIGNIFICANT CHANGE UP (ref 96–108)
CO2 SERPL-SCNC: 32 MMOL/L — HIGH (ref 22–31)
CREAT SERPL-MCNC: 1.2 MG/DL — SIGNIFICANT CHANGE UP (ref 0.5–1.3)
EGFR: 71 ML/MIN/1.73M2 — SIGNIFICANT CHANGE UP
GLUCOSE BLDC GLUCOMTR-MCNC: 122 MG/DL — HIGH (ref 70–99)
GLUCOSE BLDC GLUCOMTR-MCNC: 126 MG/DL — HIGH (ref 70–99)
GLUCOSE BLDC GLUCOMTR-MCNC: 133 MG/DL — HIGH (ref 70–99)
GLUCOSE BLDC GLUCOMTR-MCNC: 134 MG/DL — HIGH (ref 70–99)
GLUCOSE BLDC GLUCOMTR-MCNC: 135 MG/DL — HIGH (ref 70–99)
GLUCOSE BLDC GLUCOMTR-MCNC: 139 MG/DL — HIGH (ref 70–99)
GLUCOSE BLDC GLUCOMTR-MCNC: 167 MG/DL — HIGH (ref 70–99)
GLUCOSE SERPL-MCNC: 178 MG/DL — HIGH (ref 70–99)
HCT VFR BLD CALC: 41.7 % — SIGNIFICANT CHANGE UP (ref 39–50)
HGB BLD-MCNC: 13.5 G/DL — SIGNIFICANT CHANGE UP (ref 13–17)
MAGNESIUM SERPL-MCNC: 2 MG/DL — SIGNIFICANT CHANGE UP (ref 1.6–2.6)
MCHC RBC-ENTMCNC: 30.3 PG — SIGNIFICANT CHANGE UP (ref 27–34)
MCHC RBC-ENTMCNC: 32.4 GM/DL — SIGNIFICANT CHANGE UP (ref 32–36)
MCV RBC AUTO: 93.5 FL — SIGNIFICANT CHANGE UP (ref 80–100)
NRBC # BLD: 0 /100 WBCS — SIGNIFICANT CHANGE UP (ref 0–0)
PLATELET # BLD AUTO: 304 K/UL — SIGNIFICANT CHANGE UP (ref 150–400)
POTASSIUM SERPL-MCNC: 4.1 MMOL/L — SIGNIFICANT CHANGE UP (ref 3.5–5.3)
POTASSIUM SERPL-SCNC: 4.1 MMOL/L — SIGNIFICANT CHANGE UP (ref 3.5–5.3)
RBC # BLD: 4.46 M/UL — SIGNIFICANT CHANGE UP (ref 4.2–5.8)
RBC # FLD: 12.7 % — SIGNIFICANT CHANGE UP (ref 10.3–14.5)
SODIUM SERPL-SCNC: 143 MMOL/L — SIGNIFICANT CHANGE UP (ref 135–145)
WBC # BLD: 10.72 K/UL — HIGH (ref 3.8–10.5)
WBC # FLD AUTO: 10.72 K/UL — HIGH (ref 3.8–10.5)

## 2024-03-26 PROCEDURE — 11044 DBRDMT BONE 1ST 20 SQ CM/<: CPT

## 2024-03-26 PROCEDURE — 88304 TISSUE EXAM BY PATHOLOGIST: CPT | Mod: 26

## 2024-03-26 PROCEDURE — 99232 SBSQ HOSP IP/OBS MODERATE 35: CPT

## 2024-03-26 PROCEDURE — 11047 DBRDMT BONE EACH ADDL: CPT

## 2024-03-26 PROCEDURE — 88311 DECALCIFY TISSUE: CPT | Mod: 26

## 2024-03-26 DEVICE — SURGICEL NU-KNIT 6 X 9": Type: IMPLANTABLE DEVICE | Site: RIGHT | Status: FUNCTIONAL

## 2024-03-26 RX ORDER — FUROSEMIDE 40 MG
40 TABLET ORAL
Refills: 0 | Status: DISCONTINUED | OUTPATIENT
Start: 2024-03-26 | End: 2024-03-29

## 2024-03-26 RX ORDER — TIOTROPIUM BROMIDE 18 UG/1
2 CAPSULE ORAL; RESPIRATORY (INHALATION) DAILY
Refills: 0 | Status: DISCONTINUED | OUTPATIENT
Start: 2024-03-26 | End: 2024-03-29

## 2024-03-26 RX ORDER — ONDANSETRON 8 MG/1
4 TABLET, FILM COATED ORAL EVERY 6 HOURS
Refills: 0 | Status: DISCONTINUED | OUTPATIENT
Start: 2024-03-26 | End: 2024-03-29

## 2024-03-26 RX ORDER — PANTOPRAZOLE SODIUM 20 MG/1
40 TABLET, DELAYED RELEASE ORAL
Refills: 0 | Status: DISCONTINUED | OUTPATIENT
Start: 2024-03-26 | End: 2024-03-29

## 2024-03-26 RX ORDER — LANOLIN ALCOHOL/MO/W.PET/CERES
5 CREAM (GRAM) TOPICAL AT BEDTIME
Refills: 0 | Status: DISCONTINUED | OUTPATIENT
Start: 2024-03-26 | End: 2024-03-29

## 2024-03-26 RX ORDER — MULTIVIT-MIN/FERROUS GLUCONATE 9 MG/15 ML
1 LIQUID (ML) ORAL DAILY
Refills: 0 | Status: DISCONTINUED | OUTPATIENT
Start: 2024-03-26 | End: 2024-03-29

## 2024-03-26 RX ORDER — DEXTROSE 50 % IN WATER 50 %
15 SYRINGE (ML) INTRAVENOUS ONCE
Refills: 0 | Status: DISCONTINUED | OUTPATIENT
Start: 2024-03-26 | End: 2024-03-29

## 2024-03-26 RX ORDER — POLYETHYLENE GLYCOL 3350 17 G/17G
17 POWDER, FOR SOLUTION ORAL
Refills: 0 | Status: DISCONTINUED | OUTPATIENT
Start: 2024-03-26 | End: 2024-03-29

## 2024-03-26 RX ORDER — NALOXEGOL OXALATE 12.5 MG/1
25 TABLET, FILM COATED ORAL DAILY
Refills: 0 | Status: DISCONTINUED | OUTPATIENT
Start: 2024-03-26 | End: 2024-03-29

## 2024-03-26 RX ORDER — GABAPENTIN 400 MG/1
300 CAPSULE ORAL EVERY 12 HOURS
Refills: 0 | Status: DISCONTINUED | OUTPATIENT
Start: 2024-03-26 | End: 2024-03-29

## 2024-03-26 RX ORDER — ACETAMINOPHEN 500 MG
650 TABLET ORAL EVERY 6 HOURS
Refills: 0 | Status: DISCONTINUED | OUTPATIENT
Start: 2024-03-26 | End: 2024-03-29

## 2024-03-26 RX ORDER — DEXTROSE 50 % IN WATER 50 %
12.5 SYRINGE (ML) INTRAVENOUS ONCE
Refills: 0 | Status: DISCONTINUED | OUTPATIENT
Start: 2024-03-26 | End: 2024-03-29

## 2024-03-26 RX ORDER — SACCHAROMYCES BOULARDII 250 MG
250 POWDER IN PACKET (EA) ORAL
Refills: 0 | Status: DISCONTINUED | OUTPATIENT
Start: 2024-03-26 | End: 2024-03-29

## 2024-03-26 RX ORDER — ASPIRIN/CALCIUM CARB/MAGNESIUM 324 MG
81 TABLET ORAL DAILY
Refills: 0 | Status: DISCONTINUED | OUTPATIENT
Start: 2024-03-27 | End: 2024-03-29

## 2024-03-26 RX ORDER — SODIUM CHLORIDE 9 MG/ML
1000 INJECTION, SOLUTION INTRAVENOUS
Refills: 0 | Status: DISCONTINUED | OUTPATIENT
Start: 2024-03-26 | End: 2024-03-29

## 2024-03-26 RX ORDER — HYDROMORPHONE HYDROCHLORIDE 2 MG/ML
0.5 INJECTION INTRAMUSCULAR; INTRAVENOUS; SUBCUTANEOUS
Refills: 0 | Status: DISCONTINUED | OUTPATIENT
Start: 2024-03-26 | End: 2024-03-26

## 2024-03-26 RX ORDER — SODIUM CHLORIDE 9 MG/ML
1000 INJECTION, SOLUTION INTRAVENOUS
Refills: 0 | Status: DISCONTINUED | OUTPATIENT
Start: 2024-03-26 | End: 2024-03-26

## 2024-03-26 RX ORDER — ERTAPENEM SODIUM 1 G/1
1000 INJECTION, POWDER, LYOPHILIZED, FOR SOLUTION INTRAMUSCULAR; INTRAVENOUS ONCE
Refills: 0 | Status: COMPLETED | OUTPATIENT
Start: 2024-03-26 | End: 2024-03-26

## 2024-03-26 RX ORDER — SUCRALFATE 1 G
1 TABLET ORAL
Refills: 0 | Status: DISCONTINUED | OUTPATIENT
Start: 2024-03-26 | End: 2024-03-29

## 2024-03-26 RX ORDER — ATORVASTATIN CALCIUM 80 MG/1
40 TABLET, FILM COATED ORAL AT BEDTIME
Refills: 0 | Status: DISCONTINUED | OUTPATIENT
Start: 2024-03-26 | End: 2024-03-29

## 2024-03-26 RX ORDER — METOCLOPRAMIDE HCL 10 MG
5 TABLET ORAL THREE TIMES A DAY
Refills: 0 | Status: DISCONTINUED | OUTPATIENT
Start: 2024-03-26 | End: 2024-03-29

## 2024-03-26 RX ORDER — INSULIN GLARGINE 100 [IU]/ML
7 INJECTION, SOLUTION SUBCUTANEOUS AT BEDTIME
Refills: 0 | Status: DISCONTINUED | OUTPATIENT
Start: 2024-03-26 | End: 2024-03-28

## 2024-03-26 RX ORDER — DEXTROSE 50 % IN WATER 50 %
25 SYRINGE (ML) INTRAVENOUS ONCE
Refills: 0 | Status: DISCONTINUED | OUTPATIENT
Start: 2024-03-26 | End: 2024-03-29

## 2024-03-26 RX ORDER — FERROUS SULFATE 325(65) MG
325 TABLET ORAL
Refills: 0 | Status: DISCONTINUED | OUTPATIENT
Start: 2024-03-26 | End: 2024-03-29

## 2024-03-26 RX ORDER — APIXABAN 2.5 MG/1
5 TABLET, FILM COATED ORAL EVERY 12 HOURS
Refills: 0 | Status: DISCONTINUED | OUTPATIENT
Start: 2024-03-26 | End: 2024-03-29

## 2024-03-26 RX ORDER — INSULIN LISPRO 100/ML
VIAL (ML) SUBCUTANEOUS
Refills: 0 | Status: DISCONTINUED | OUTPATIENT
Start: 2024-03-26 | End: 2024-03-29

## 2024-03-26 RX ORDER — DILTIAZEM HCL 120 MG
360 CAPSULE, EXT RELEASE 24 HR ORAL DAILY
Refills: 0 | Status: DISCONTINUED | OUTPATIENT
Start: 2024-03-26 | End: 2024-03-29

## 2024-03-26 RX ORDER — OXYBUTYNIN CHLORIDE 5 MG
5 TABLET ORAL
Refills: 0 | Status: DISCONTINUED | OUTPATIENT
Start: 2024-03-26 | End: 2024-03-29

## 2024-03-26 RX ORDER — INSULIN LISPRO 100/ML
5 VIAL (ML) SUBCUTANEOUS
Refills: 0 | Status: DISCONTINUED | OUTPATIENT
Start: 2024-03-26 | End: 2024-03-29

## 2024-03-26 RX ORDER — SENNA PLUS 8.6 MG/1
2 TABLET ORAL AT BEDTIME
Refills: 0 | Status: DISCONTINUED | OUTPATIENT
Start: 2024-03-26 | End: 2024-03-29

## 2024-03-26 RX ORDER — CHOLECALCIFEROL (VITAMIN D3) 125 MCG
1000 CAPSULE ORAL DAILY
Refills: 0 | Status: DISCONTINUED | OUTPATIENT
Start: 2024-03-26 | End: 2024-03-29

## 2024-03-26 RX ORDER — SODIUM CHLORIDE 0.65 %
1 AEROSOL, SPRAY (ML) NASAL DAILY
Refills: 0 | Status: DISCONTINUED | OUTPATIENT
Start: 2024-03-26 | End: 2024-03-29

## 2024-03-26 RX ORDER — GLUCAGON INJECTION, SOLUTION 0.5 MG/.1ML
1 INJECTION, SOLUTION SUBCUTANEOUS ONCE
Refills: 0 | Status: DISCONTINUED | OUTPATIENT
Start: 2024-03-26 | End: 2024-03-29

## 2024-03-26 RX ORDER — ASCORBIC ACID 60 MG
500 TABLET,CHEWABLE ORAL DAILY
Refills: 0 | Status: DISCONTINUED | OUTPATIENT
Start: 2024-03-26 | End: 2024-03-29

## 2024-03-26 RX ORDER — OXYCODONE HYDROCHLORIDE 5 MG/1
10 TABLET ORAL
Refills: 0 | Status: DISCONTINUED | OUTPATIENT
Start: 2024-03-26 | End: 2024-03-29

## 2024-03-26 RX ORDER — LORATADINE 10 MG/1
10 TABLET ORAL DAILY
Refills: 0 | Status: DISCONTINUED | OUTPATIENT
Start: 2024-03-26 | End: 2024-03-29

## 2024-03-26 RX ORDER — INSULIN LISPRO 100/ML
VIAL (ML) SUBCUTANEOUS AT BEDTIME
Refills: 0 | Status: DISCONTINUED | OUTPATIENT
Start: 2024-03-26 | End: 2024-03-29

## 2024-03-26 RX ADMIN — Medication 5 MILLIGRAM(S): at 07:06

## 2024-03-26 RX ADMIN — Medication 500 MILLIGRAM(S): at 12:30

## 2024-03-26 RX ADMIN — Medication 250 MILLIGRAM(S): at 07:07

## 2024-03-26 RX ADMIN — GABAPENTIN 300 MILLIGRAM(S): 400 CAPSULE ORAL at 22:16

## 2024-03-26 RX ADMIN — Medication 5 MILLIGRAM(S): at 22:10

## 2024-03-26 RX ADMIN — LORATADINE 10 MILLIGRAM(S): 10 TABLET ORAL at 12:29

## 2024-03-26 RX ADMIN — Medication 2: at 08:59

## 2024-03-26 RX ADMIN — Medication 5 MILLIGRAM(S): at 13:49

## 2024-03-26 RX ADMIN — Medication 1 SPRAY(S): at 12:29

## 2024-03-26 RX ADMIN — INSULIN GLARGINE 7 UNIT(S): 100 INJECTION, SOLUTION SUBCUTANEOUS at 23:42

## 2024-03-26 RX ADMIN — ERTAPENEM SODIUM 120 MILLIGRAM(S): 1 INJECTION, POWDER, LYOPHILIZED, FOR SOLUTION INTRAMUSCULAR; INTRAVENOUS at 12:16

## 2024-03-26 RX ADMIN — Medication 1 GRAM(S): at 07:13

## 2024-03-26 RX ADMIN — OXYCODONE HYDROCHLORIDE 10 MILLIGRAM(S): 5 TABLET ORAL at 22:11

## 2024-03-26 RX ADMIN — Medication 1000 UNIT(S): at 12:30

## 2024-03-26 RX ADMIN — SODIUM CHLORIDE 75 MILLILITER(S): 9 INJECTION, SOLUTION INTRAVENOUS at 22:16

## 2024-03-26 RX ADMIN — Medication 325 MILLIGRAM(S): at 07:07

## 2024-03-26 RX ADMIN — ATORVASTATIN CALCIUM 40 MILLIGRAM(S): 80 TABLET, FILM COATED ORAL at 22:10

## 2024-03-26 RX ADMIN — SODIUM CHLORIDE 75 MILLILITER(S): 9 INJECTION, SOLUTION INTRAVENOUS at 20:31

## 2024-03-26 RX ADMIN — Medication 10 MILLIGRAM(S): at 12:28

## 2024-03-26 RX ADMIN — Medication 40 MILLIGRAM(S): at 13:49

## 2024-03-26 RX ADMIN — SENNA PLUS 2 TABLET(S): 8.6 TABLET ORAL at 22:11

## 2024-03-26 RX ADMIN — NALOXEGOL OXALATE 25 MILLIGRAM(S): 12.5 TABLET, FILM COATED ORAL at 12:28

## 2024-03-26 RX ADMIN — Medication 360 MILLIGRAM(S): at 07:06

## 2024-03-26 RX ADMIN — OXYCODONE HYDROCHLORIDE 10 MILLIGRAM(S): 5 TABLET ORAL at 07:06

## 2024-03-26 RX ADMIN — Medication 5 MILLIGRAM(S): at 23:43

## 2024-03-26 RX ADMIN — APIXABAN 5 MILLIGRAM(S): 2.5 TABLET, FILM COATED ORAL at 22:10

## 2024-03-26 RX ADMIN — GABAPENTIN 300 MILLIGRAM(S): 400 CAPSULE ORAL at 07:06

## 2024-03-26 RX ADMIN — Medication 40 MILLIGRAM(S): at 07:07

## 2024-03-26 RX ADMIN — OXYCODONE HYDROCHLORIDE 10 MILLIGRAM(S): 5 TABLET ORAL at 08:06

## 2024-03-26 RX ADMIN — PANTOPRAZOLE SODIUM 40 MILLIGRAM(S): 20 TABLET, DELAYED RELEASE ORAL at 07:13

## 2024-03-26 RX ADMIN — Medication 1 TABLET(S): at 12:30

## 2024-03-26 NOTE — PROGRESS NOTE ADULT - SUBJECTIVE AND OBJECTIVE BOX
CAPILLARY BLOOD GLUCOSE      POCT Blood Glucose.: 163 mg/dL (25 Mar 2024 21:27)  POCT Blood Glucose.: 113 mg/dL (25 Mar 2024 16:53)  POCT Blood Glucose.: 116 mg/dL (25 Mar 2024 12:06)  POCT Blood Glucose.: 117 mg/dL (25 Mar 2024 08:10)      Vital Signs Last 24 Hrs  T(C): 36.7 (26 Mar 2024 05:17), Max: 36.9 (25 Mar 2024 12:02)  T(F): 98 (26 Mar 2024 05:17), Max: 98.5 (25 Mar 2024 12:02)  HR: 78 (26 Mar 2024 05:17) (78 - 93)  BP: 125/84 (26 Mar 2024 05:17) (111/73 - 125/84)  BP(mean): --  RR: 18 (26 Mar 2024 05:17) (18 - 18)  SpO2: 95% (26 Mar 2024 05:17) (92% - 95%)    Parameters below as of 25 Mar 2024 20:30  Patient On (Oxygen Delivery Method): room air        General: WN/WD NAD  Respiratory: CTA B/L  CV: RRR, S1S2, no murmurs, rubs or gallops  Abdominal: Soft, NT, ND +BS, Last BM  Extremities: No edema, + peripheral pulses     03-25    145  |  107  |  18  ----------------------------<  112<H>  3.3<L>   |  30  |  1.10    Ca    8.7      25 Mar 2024 07:55  Mg     1.9     03-25        atorvastatin 40 milliGRAM(s) Oral at bedtime  dextrose 50% Injectable 25 Gram(s) IV Push once  dextrose 50% Injectable 12.5 Gram(s) IV Push once  dextrose 50% Injectable 25 Gram(s) IV Push once  dextrose Oral Gel 15 Gram(s) Oral once PRN  glucagon  Injectable 1 milliGRAM(s) IntraMuscular once  insulin glargine Injectable (LANTUS) 15 Unit(s) SubCutaneous every morning  insulin glargine Injectable (LANTUS) 7 Unit(s) SubCutaneous at bedtime  insulin lispro (ADMELOG) corrective regimen sliding scale   SubCutaneous three times a day before meals  insulin lispro (ADMELOG) corrective regimen sliding scale   SubCutaneous at bedtime  insulin lispro Injectable (ADMELOG) 5 Unit(s) SubCutaneous three times a day before meals

## 2024-03-26 NOTE — PROGRESS NOTE ADULT - SUBJECTIVE AND OBJECTIVE BOX
[facility] DIVISION of INFECTIOUS DISEASE  Juventino Whitten MD PhD, Symone Hickey MD, Anne-Marie Li MD, Jeffery Jacobs MD, Ryan Romeo MD  and providing coverage with Melody Armstrong MD  Providing Infectious Disease Consultations at [facility], [facility], [facility]'s    Office# [phone] to schedule follow up appointments  Answering Service for urgent calls or New Consults [phone]  Cell# to text for urgent issues Juventino Whitten [phone]     infectious diseases progress note:    SARAH MORRISON is a 53y y. o. Male patient    Overnight and events of the last 24hrs reviewed    Allergies    No Known Drug Allergies  fish (Hives)    Intolerances        ANTIBIOTICS/RELEVANT:  antimicrobials  ertapenem  IVPB 1000 milliGRAM(s) IV Intermittent every 24 hours    immunologic:    OTHER:  acetaminophen     Tablet .. 650 milliGRAM(s) Oral every 6 hours PRN  albuterol    0.083% 2.5 milliGRAM(s) Nebulizer every 4 hours PRN  aluminum hydroxide/magnesium hydroxide/simethicone Suspension 30 milliLiter(s) Oral every 4 hours PRN  ascorbic acid 500 milliGRAM(s) Oral daily  atorvastatin 40 milliGRAM(s) Oral at bedtime  bisacodyl 10 milliGRAM(s) Oral daily  cholecalciferol 1000 Unit(s) Oral daily  dextrose 5%. 1000 milliLiter(s) IV Continuous <Continuous>  dextrose 5%. 1000 milliLiter(s) IV Continuous <Continuous>  dextrose 50% Injectable 25 Gram(s) IV Push once  dextrose 50% Injectable 12.5 Gram(s) IV Push once  dextrose 50% Injectable 25 Gram(s) IV Push once  dextrose Oral Gel 15 Gram(s) Oral once PRN  diltiazem    milliGRAM(s) Oral daily  ferrous    sulfate 325 milliGRAM(s) Oral two times a day  furosemide    Tablet 40 milliGRAM(s) Oral two times a day  gabapentin 300 milliGRAM(s) Oral every 12 hours  glucagon  Injectable 1 milliGRAM(s) IntraMuscular once  insulin glargine Injectable (LANTUS) 15 Unit(s) SubCutaneous every morning  insulin glargine Injectable (LANTUS) 7 Unit(s) SubCutaneous at bedtime  insulin lispro (ADMELOG) corrective regimen sliding scale   SubCutaneous three times a day before meals  insulin lispro (ADMELOG) corrective regimen sliding scale   SubCutaneous at bedtime  insulin lispro Injectable (ADMELOG) 5 Unit(s) SubCutaneous three times a day before meals  loratadine 10 milliGRAM(s) Oral daily  melatonin 5 milliGRAM(s) Oral at bedtime  metoclopramide 5 milliGRAM(s) Oral three times a day  multivitamin/minerals 1 Tablet(s) Oral daily  naloxegol 25 milliGRAM(s) Oral daily  ondansetron Injectable 4 milliGRAM(s) IV Push every 6 hours PRN  oxybutynin 5 milliGRAM(s) Oral two times a day  oxyCODONE    IR 10 milliGRAM(s) Oral two times a day  pantoprazole    Tablet 40 milliGRAM(s) Oral before breakfast  polyethylene glycol 3350 17 Gram(s) Oral two times a day  saccharomyces boulardii 250 milliGRAM(s) Oral two times a day  senna 2 Tablet(s) Oral at bedtime  sodium chloride 0.65% Nasal 1 Spray(s) Both Nostrils daily  sucralfate 1 Gram(s) Oral two times a day  tiotropium 2.5 MICROgram(s) Inhaler 2 Puff(s) Inhalation daily      Objective:  Vital Signs Last 24 Hrs  T(C): 36.7 (26 Mar 2024 05:17), Max: 36.9 (25 Mar 2024 12:02)  T(F): 98 (26 Mar 2024 05:17), Max: 98.5 (25 Mar 2024 12:02)  HR: 78 (26 Mar 2024 05:17) (78 - 93)  BP: 125/84 (26 Mar 2024 05:17) (111/73 - 125/84)  BP(mean): --  RR: 18 (26 Mar 2024 05:17) (18 - 18)  SpO2: 95% (26 Mar 2024 05:17) (92% - 95%)    Parameters below as of 25 Mar 2024 20:30  Patient On (Oxygen Delivery Method): room air        T(C): 36.7 (03-26-24 @ 05:17), Max: 37.1 (03-24-24 @ 20:20)  T(C): 36.7 (03-26-24 @ 05:17), Max: 37.3 (03-23-24 @ 20:40)  T(C): 36.7 (03-26-24 @ 05:17), Max: 37.3 (03-22-24 @ 17:45)    PHYSICAL EXAM:  HEENT: NC atraumatic  Neck: supple  Respiratory: no accessory muscle use, breathing comfortably  Cardiovascular: distant  Gastrointestinal: normal appearing, nondistended  Extremities: no clubbing, no cyanosis,        LABS:                          13.5   10.72 )-----------( 304      ( 26 Mar 2024 07:20 )             41.7       WBC  10.72 03-26 @ 07:20  10.44 03-25 @ 07:55  11.25 03-22 @ 05:48  7.98 03-20 @ 06:15      03-26    143  |  104  |  19  ----------------------------<  178<H>  4.1   |  32<H>  |  1.20    Ca    8.8      26 Mar 2024 07:20  Mg     2.0     03-26        Creatinine: 1.20 mg/dL (03-26-24 @ 07:20)  Creatinine: 1.10 mg/dL (03-25-24 @ 07:55)  Creatinine: 1.10 mg/dL (03-24-24 @ 07:45)  Creatinine: 0.99 mg/dL (03-22-24 @ 05:48)  Creatinine: 1.10 mg/dL (03-21-24 @ 08:27)  Creatinine: 1.20 mg/dL (03-20-24 @ 06:15)        Urinalysis Basic - ( 26 Mar 2024 07:20 )    Color: x / Appearance: x / SG: x / pH: x  Gluc: 178 mg/dL / Ketone: x  / Bili: x / Urobili: x   Blood: x / Protein: x / Nitrite: x   Leuk Esterase: x / RBC: x / WBC x   Sq Epi: x / Non Sq Epi: x / Bacteria: x            INFLAMMATORY MARKERS      MICROBIOLOGY:              RADIOLOGY & ADDITIONAL STUDIES:

## 2024-03-26 NOTE — PROGRESS NOTE ADULT - ASSESSMENT
54 y/o M PMhx AF on Eliquis, chronic jacques, CAD s/p stents, CVA, DM, HTN, PE who presented w/ SOB, and dislodged jacques as well as dysuria.  also found to have Afib w/ RVR  admitted with  concerns for sepsis, CAUTI, dislodged jacques  reported dysuria  jacques placed in ED  CT abd/pelvis- No hydronephrosis or nephrolithiasis.    Recommendations  1-Bacteremia ESBL E coli (suspected pyelonephritis)   -  Escherichia coli: Detec   -  ESBL: Detec   -  CTX-M Resistance Marker: Detec  Culture - Urine (03.16.24 @ 22:35)  >100,000 CFU/ml Escherichia coli ESBL  Repeat blood cultures collected 3/18 5am - NGTD past 48 hours  ertapenem started 3/17 with last day 3/26-TODAY    2-Foot wound open area on pressure location, now dressed, podiatry involved for this unstageable ulcer with rec  Xray-no evidence of osteo  MRI-not done with aneurysm clips so plan for biopsy 3/26 with recs to follow based on results  wound care    Thank you for consulting us and involving us in the management of this most interesting and challenging case.  We will follow along in the care of this patient. Please call us at [phone] or text me directly on my cell# at [phone] with any concerns.

## 2024-03-26 NOTE — PROGRESS NOTE ADULT - ASSESSMENT
53M with PMH of AF on Eliquis, indwelling Aguilera CAD s/p stents, CVA, DM, HTN, osteomyelitis s/p debridement, PE, sent from [facility] for elevated BP, SOB, and dislodged Aguilera, found to be febrile in ER, a/w sepsis and A fib RVR.     Sepsis, Afib RVR, CAD, HTN  - s/p Dig load and Cardizem gtt.  Dig level = 1.5.  Keep off Dig, not a home med.   - Had a 3.4 sec pause, 3/22.  BB D/C'd.  No further pauses noted.    -continue Cardizem  mg daily  - Eliquis on HOLD  for R heel debridement and bone biopsy 2/26. resume when able    - BNP 7622.CxR, 3/23, showed mild Lt and trace Rt pleural effusions  -Now appears euvolemic, tolerating RA  - Continue PO Lasix 40mg BID  - 3/18/24 Technically difficult ECHO w/ normal LV size and function EF 50-55%, mildly reduced RV systolic function, mod dilated LA and RA, mod to sev MR, mild to mod TR, PASP 36  - Creatinine remains normal.  Hypernatremia resolved    - EKG: A fib RVR @ 154  - Troponin: <-51, no need to trend  - No evidence of any active ischemia   - Continue aspirin and statin     - BP stable and controlled.  No further hypotensive episode  - Continue to hold home Imdur and Losartan to allow room for AV nodals     - Monitor and replete lytes, keep K>4, Mg>2.  - Will continue to follow.    Nery Alex NP  Nurse Practitioner- Cardiology   Call TEAMS 53M with PMH of AF on Eliquis, indwelling Aguilera CAD s/p stents, CVA, DM, HTN, osteomyelitis s/p debridement, PE, sent from [facility] for elevated BP, SOB, and dislodged Aguilera, found to be febrile in ER, a/w sepsis and A fib RVR.     Sepsis, Afib RVR, CAD, HTN  - s/p Dig load and Cardizem gtt.  Dig level = 1.5.  Keep off Dig, not a home med.   - Had a 3.4 sec pause, 3/22.  BB D/C'd.  No further pauses noted.    -continue Cardizem  mg daily  - Eliquis on HOLD  for R heel debridement and bone biopsy 2/26. resume when able  - Pt has no active ischemia, decompensated heart failure, unstable arrythmia, or severe stenotic valvular disease. Patient is optimized from cardiovascular standpoint to proceed with planned procedure with routine hemodynamic monitoring.     - BNP 7622.CxR, 3/23, showed mild Lt and trace Rt pleural effusions  -Now appears euvolemic, tolerating RA  - Continue PO Lasix 40mg BID  - 3/18/24 Technically difficult ECHO w/ normal LV size and function EF 50-55%, mildly reduced RV systolic function, mod dilated LA and RA, mod to sev MR, mild to mod TR, PASP 36  - Creatinine remains normal.  Hypernatremia resolved    - EKG: A fib RVR @ 154  - Troponin: <-51, no need to trend  - No evidence of any active ischemia   - Continue aspirin and statin     - BP stable and controlled.  No further hypotensive episode  - Continue to hold home Imdur and Losartan to allow room for AV nodals     - Monitor and replete lytes, keep K>4, Mg>2.  - Will continue to follow.    Nery Alex NP  Nurse Practitioner- Cardiology   Call TEAMS

## 2024-03-26 NOTE — PROGRESS NOTE ADULT - PROBLEM SELECTOR PLAN 3
Continue iv abx and wound care  Unable to have MRI b/c of brain aneurysm clips  Will have R heel debridement and bone biopsy today  MAY PROCEED TO O.R. FOR PLANNED PROCEDURE  Podiatry f/u

## 2024-03-26 NOTE — PROGRESS NOTE ADULT - SUBJECTIVE AND OBJECTIVE BOX
[facility] -- Bolivar Carbajal,  Brittany, Reynaldo Sweeney Savella, Goodger, Cohen  Office # [phone]    Follow Up:  Afib RVR    Subjective/Observations: No events overnight resting comfortably in bed.  No complaints of chest pain, dyspnea, or palpitations reported. No signs of orthopnea or PND. Tolerating RA.       REVIEW OF SYSTEMS: All other review of systems is negative unless indicated above  PAST MEDICAL & SURGICAL HISTORY:  Diabetes      Diabetes mellitus with no complication      Afib      Hypertension      BPH (benign prostatic hyperplasia)      Perforated gastric ulcer  s/p emergent ex-lap omentopexy and plication 6/2019      Pulmonary embolism      History of non-ST elevation myocardial infarction (NSTEMI)      Osteomyelitis  s/p debridement      CAD S/P percutaneous coronary angioplasty      Cerebrovascular accident      H/O abdominal surgery      Perforated gastric ulcer      Traumatic amputation of left foot, initial encounter        MEDICATIONS  (STANDING):  ascorbic acid 500 milliGRAM(s) Oral daily  atorvastatin 40 milliGRAM(s) Oral at bedtime  bisacodyl 10 milliGRAM(s) Oral daily  cholecalciferol 1000 Unit(s) Oral daily  dextrose 5%. 1000 milliLiter(s) (100 mL/Hr) IV Continuous <Continuous>  dextrose 5%. 1000 milliLiter(s) (50 mL/Hr) IV Continuous <Continuous>  dextrose 50% Injectable 25 Gram(s) IV Push once  dextrose 50% Injectable 12.5 Gram(s) IV Push once  dextrose 50% Injectable 25 Gram(s) IV Push once  diltiazem    milliGRAM(s) Oral daily  ertapenem  IVPB 1000 milliGRAM(s) IV Intermittent every 24 hours  ferrous    sulfate 325 milliGRAM(s) Oral two times a day  furosemide    Tablet 40 milliGRAM(s) Oral two times a day  gabapentin 300 milliGRAM(s) Oral every 12 hours  glucagon  Injectable 1 milliGRAM(s) IntraMuscular once  insulin glargine Injectable (LANTUS) 15 Unit(s) SubCutaneous every morning  insulin glargine Injectable (LANTUS) 7 Unit(s) SubCutaneous at bedtime  insulin lispro (ADMELOG) corrective regimen sliding scale   SubCutaneous three times a day before meals  insulin lispro (ADMELOG) corrective regimen sliding scale   SubCutaneous at bedtime  insulin lispro Injectable (ADMELOG) 5 Unit(s) SubCutaneous three times a day before meals  loratadine 10 milliGRAM(s) Oral daily  melatonin 5 milliGRAM(s) Oral at bedtime  metoclopramide 5 milliGRAM(s) Oral three times a day  multivitamin/minerals 1 Tablet(s) Oral daily  naloxegol 25 milliGRAM(s) Oral daily  oxybutynin 5 milliGRAM(s) Oral two times a day  oxyCODONE    IR 10 milliGRAM(s) Oral two times a day  pantoprazole    Tablet 40 milliGRAM(s) Oral before breakfast  polyethylene glycol 3350 17 Gram(s) Oral two times a day  saccharomyces boulardii 250 milliGRAM(s) Oral two times a day  senna 2 Tablet(s) Oral at bedtime  sodium chloride 0.65% Nasal 1 Spray(s) Both Nostrils daily  sucralfate 1 Gram(s) Oral two times a day  tiotropium 2.5 MICROgram(s) Inhaler 2 Puff(s) Inhalation daily    MEDICATIONS  (PRN):  acetaminophen     Tablet .. 650 milliGRAM(s) Oral every 6 hours PRN Temp greater or equal to 38C (100.4F), Mild Pain (1 - 3)  albuterol    0.083% 2.5 milliGRAM(s) Nebulizer every 4 hours PRN Shortness of Breath and/or Wheezing  aluminum hydroxide/magnesium hydroxide/simethicone Suspension 30 milliLiter(s) Oral every 4 hours PRN Dyspepsia  dextrose Oral Gel 15 Gram(s) Oral once PRN Blood Glucose LESS THAN 70 milliGRAM(s)/deciliter  ondansetron Injectable 4 milliGRAM(s) IV Push every 6 hours PRN Nausea and/or Vomiting    Allergies    No Known Drug Allergies  fish (Hives)    Intolerances      Vital Signs Last 24 Hrs  T(C): 36.7 (26 Mar 2024 05:17), Max: 36.9 (25 Mar 2024 12:02)  T(F): 98 (26 Mar 2024 05:17), Max: 98.5 (25 Mar 2024 12:02)  HR: 78 (26 Mar 2024 05:17) (78 - 93)  BP: 125/84 (26 Mar 2024 05:17) (111/73 - 125/84)  BP(mean): --  RR: 18 (26 Mar 2024 05:17) (18 - 18)  SpO2: 95% (26 Mar 2024 05:17) (92% - 95%)    Parameters below as of 25 Mar 2024 20:30  Patient On (Oxygen Delivery Method): room air      I&O's Summary    25 Mar 2024 07:01  -  26 Mar 2024 07:00  --------------------------------------------------------  IN: 0 mL / OUT: 650 mL / NET: -650 mL        TELE: Afib RVR   PHYSICAL EXAM:  Constitutional: NAD  HEENT: Moist Mucous Membranes, Anicteric  Pulmonary: Non-labored, breath sounds are diminished bilaterally, No wheezing, no rales no rhonchi  Cardiovascular: IRRR, S1 and S2, +murmurs, no rubs, gallops or clicks  Gastrointestinal: Bowel Sounds present, soft, nontender.   Lymph: No peripheral edema. No lymphadenopathy.  Skin: No visible rashes.  Right heel ulcer with dressing dry and intact  Psych:  Mood & affect appropriate    LABS: All Labs Reviewed:                        13.5   10.72 )-----------( 304      ( 26 Mar 2024 07:20 )             41.7                         13.3   10.44 )-----------( 300      ( 25 Mar 2024 07:55 )             41.5     26 Mar 2024 07:20    143    |  104    |  19     ----------------------------<  178    4.1     |  32     |  1.20   25 Mar 2024 07:55    145    |  107    |  18     ----------------------------<  112    3.3     |  30     |  1.10   24 Mar 2024 07:45    145    |  105    |  21     ----------------------------<  191    3.3     |  32     |  1.10     Ca    8.8        26 Mar 2024 07:20  Ca    8.7        25 Mar 2024 07:55  Ca    8.7        24 Mar 2024 07:45  Mg     2.0       26 Mar 2024 07:20  Mg     1.9       25 Mar 2024 07:55  Mg     1.6       24 Mar 2024 07:45            12 Lead ECG:   Ventricular Rate 154 BPM    QRS Duration 76 ms    Q-T Interval 282 ms    QTC Calculation(Bazett) 451 ms    R Axis 15 degrees    T Axis 194 degrees    Diagnosis Line *** Critical Test Result: High HR  Atrial fibrillation with rapid ventricular response  Low voltage QRS  Nonspecific ST and T wave abnormality    Confirmed by JOHN SWEENEY ([id number]) on 3/17/2024 7:30:13 AM (03-16-24 @ 23:50)       EXAM:  ECHO TTE WO CON COMP W DOPPLR         PROCEDURE DATE:  12/24/2019        INTERPRETATION:  INDICATION: Coronary artery disease    Blood Pressure 117/71    Height 187     Weight 93       BSA 2.2    Dimensions:    LA 4.2       Normal Values: 2.0 - 4.0 cm    Ao 4.0        Normal Values: 2.0 - 3.8 cm  SEPTUM 1.6       Normal Values: 0.6 - 1.2 cm  PWT 1.6       Normal Values: 0.6 - 1.1 cm  LVIDd 5.8         Normal Values: 3.0 - 5.6 cm  LVIDs 3.2         Normal Values: 1.8 - 4.0 cm    Derived Variables:  LVMI g/m2  RWT  Fractional Short  Ejection Fraction    Doppler Peak v. AoV= (m/sec)    OBSERVATIONS:    Mitral Valve: normal, mild MR.  Aortic Valve/Aorta: normal trileaflet aortic valve.  Tricuspid Valve: normal with trace TR.  Pulmonic Valve: normal  Left Atrium: Enlarged  Right Atrium: normal  Left Ventricle: Severe concentric LVH with normal systolic function, estimated LVEF of 65%.  Right Ventricle: normal size and systolic function.  Pericardium/Pleura: no significant pleural effusion, no significant pericardial effusion.  Pulmonary/RV Pressure: Inadequate TR jet to estimate PA systolic pressure  LV Diastolic Function: Grade 2diastolic dysfunction.    Severe concentric LVH, and mild LV enlargement, with normal systolic function, estimated LVEF of 65%. Normal right ventricular size and systolic function. Grade 2diastolic dysfunction. Left atrial enlargement The aortic root is normal in size. The mitral valve is structurally normal, mild MR. The aortic valve is trileaflet without stenosis or insufficiency. Trace physiologic TR. Inadequate TR jet to estimate PA systolic pressure No significant pericardial effusion.                  JOVANNI ALVAREZ M.D., ATTENDING CARDIOLOGIST  This document has been electronically signed. Dec 25 2019 11:45AM                  [facility] -- Bolivar Carbajal,  Brittany, Reynaldo Sweeney Savella, Goodger, Cohen  Office # [phone]    Follow Up:  Afib RVR    Subjective/Observations: No events overnight resting comfortably in bed.  No complaints of chest pain, dyspnea, or palpitations reported. No signs of orthopnea or PND. Tolerating RA.       REVIEW OF SYSTEMS: All other review of systems is negative unless indicated above  PAST MEDICAL & SURGICAL HISTORY:  Diabetes      Diabetes mellitus with no complication      Afib      Hypertension      BPH (benign prostatic hyperplasia)      Perforated gastric ulcer  s/p emergent ex-lap omentopexy and plication 6/2019      Pulmonary embolism      History of non-ST elevation myocardial infarction (NSTEMI)      Osteomyelitis  s/p debridement      CAD S/P percutaneous coronary angioplasty      Cerebrovascular accident      H/O abdominal surgery      Perforated gastric ulcer      Traumatic amputation of left foot, initial encounter        MEDICATIONS  (STANDING):  ascorbic acid 500 milliGRAM(s) Oral daily  atorvastatin 40 milliGRAM(s) Oral at bedtime  bisacodyl 10 milliGRAM(s) Oral daily  cholecalciferol 1000 Unit(s) Oral daily  dextrose 5%. 1000 milliLiter(s) (100 mL/Hr) IV Continuous <Continuous>  dextrose 5%. 1000 milliLiter(s) (50 mL/Hr) IV Continuous <Continuous>  dextrose 50% Injectable 25 Gram(s) IV Push once  dextrose 50% Injectable 12.5 Gram(s) IV Push once  dextrose 50% Injectable 25 Gram(s) IV Push once  diltiazem    milliGRAM(s) Oral daily  ertapenem  IVPB 1000 milliGRAM(s) IV Intermittent every 24 hours  ferrous    sulfate 325 milliGRAM(s) Oral two times a day  furosemide    Tablet 40 milliGRAM(s) Oral two times a day  gabapentin 300 milliGRAM(s) Oral every 12 hours  glucagon  Injectable 1 milliGRAM(s) IntraMuscular once  insulin glargine Injectable (LANTUS) 15 Unit(s) SubCutaneous every morning  insulin glargine Injectable (LANTUS) 7 Unit(s) SubCutaneous at bedtime  insulin lispro (ADMELOG) corrective regimen sliding scale   SubCutaneous three times a day before meals  insulin lispro (ADMELOG) corrective regimen sliding scale   SubCutaneous at bedtime  insulin lispro Injectable (ADMELOG) 5 Unit(s) SubCutaneous three times a day before meals  loratadine 10 milliGRAM(s) Oral daily  melatonin 5 milliGRAM(s) Oral at bedtime  metoclopramide 5 milliGRAM(s) Oral three times a day  multivitamin/minerals 1 Tablet(s) Oral daily  naloxegol 25 milliGRAM(s) Oral daily  oxybutynin 5 milliGRAM(s) Oral two times a day  oxyCODONE    IR 10 milliGRAM(s) Oral two times a day  pantoprazole    Tablet 40 milliGRAM(s) Oral before breakfast  polyethylene glycol 3350 17 Gram(s) Oral two times a day  saccharomyces boulardii 250 milliGRAM(s) Oral two times a day  senna 2 Tablet(s) Oral at bedtime  sodium chloride 0.65% Nasal 1 Spray(s) Both Nostrils daily  sucralfate 1 Gram(s) Oral two times a day  tiotropium 2.5 MICROgram(s) Inhaler 2 Puff(s) Inhalation daily    MEDICATIONS  (PRN):  acetaminophen     Tablet .. 650 milliGRAM(s) Oral every 6 hours PRN Temp greater or equal to 38C (100.4F), Mild Pain (1 - 3)  albuterol    0.083% 2.5 milliGRAM(s) Nebulizer every 4 hours PRN Shortness of Breath and/or Wheezing  aluminum hydroxide/magnesium hydroxide/simethicone Suspension 30 milliLiter(s) Oral every 4 hours PRN Dyspepsia  dextrose Oral Gel 15 Gram(s) Oral once PRN Blood Glucose LESS THAN 70 milliGRAM(s)/deciliter  ondansetron Injectable 4 milliGRAM(s) IV Push every 6 hours PRN Nausea and/or Vomiting    Allergies    No Known Drug Allergies  fish (Hives)    Intolerances      Vital Signs Last 24 Hrs  T(C): 36.7 (26 Mar 2024 05:17), Max: 36.9 (25 Mar 2024 12:02)  T(F): 98 (26 Mar 2024 05:17), Max: 98.5 (25 Mar 2024 12:02)  HR: 78 (26 Mar 2024 05:17) (78 - 93)  BP: 125/84 (26 Mar 2024 05:17) (111/73 - 125/84)  BP(mean): --  RR: 18 (26 Mar 2024 05:17) (18 - 18)  SpO2: 95% (26 Mar 2024 05:17) (92% - 95%)    Parameters below as of 25 Mar 2024 20:30  Patient On (Oxygen Delivery Method): room air      I&O's Summary    25 Mar 2024 07:01  -  26 Mar 2024 07:00  --------------------------------------------------------  IN: 0 mL / OUT: 650 mL / NET: -650 mL        TELE: Afib rate controlled, 4 beats of nsvt  PHYSICAL EXAM:  Constitutional: NAD  HEENT: Moist Mucous Membranes, Anicteric  Pulmonary: Non-labored, breath sounds are diminished bilaterally, No wheezing, no rales no rhonchi  Cardiovascular: IRRR, S1 and S2, +murmurs, no rubs, gallops or clicks  Gastrointestinal: Bowel Sounds present, soft, nontender.   Lymph: No peripheral edema. No lymphadenopathy.  Skin: No visible rashes.  Right heel ulcer with dressing dry and intact  Psych:  Mood & affect appropriate    LABS: All Labs Reviewed:                        13.5   10.72 )-----------( 304      ( 26 Mar 2024 07:20 )             41.7                         13.3   10.44 )-----------( 300      ( 25 Mar 2024 07:55 )             41.5     26 Mar 2024 07:20    143    |  104    |  19     ----------------------------<  178    4.1     |  32     |  1.20   25 Mar 2024 07:55    145    |  107    |  18     ----------------------------<  112    3.3     |  30     |  1.10   24 Mar 2024 07:45    145    |  105    |  21     ----------------------------<  191    3.3     |  32     |  1.10     Ca    8.8        26 Mar 2024 07:20  Ca    8.7        25 Mar 2024 07:55  Ca    8.7        24 Mar 2024 07:45  Mg     2.0       26 Mar 2024 07:20  Mg     1.9       25 Mar 2024 07:55  Mg     1.6       24 Mar 2024 07:45            12 Lead ECG:   Ventricular Rate 154 BPM    QRS Duration 76 ms    Q-T Interval 282 ms    QTC Calculation(Bazett) 451 ms    R Axis 15 degrees    T Axis 194 degrees    Diagnosis Line *** Critical Test Result: High HR  Atrial fibrillation with rapid ventricular response  Low voltage QRS  Nonspecific ST and T wave abnormality    Confirmed by JOHN SWEENEY ([id number]) on 3/17/2024 7:30:13 AM (03-16-24 @ 23:50)       EXAM:  ECHO TTE WO CON COMP W DOPPLR         PROCEDURE DATE:  12/24/2019        INTERPRETATION:  INDICATION: Coronary artery disease    Blood Pressure 117/71    Height 187     Weight 93       BSA 2.2    Dimensions:    LA 4.2       Normal Values: 2.0 - 4.0 cm    Ao 4.0        Normal Values: 2.0 - 3.8 cm  SEPTUM 1.6       Normal Values: 0.6 - 1.2 cm  PWT 1.6       Normal Values: 0.6 - 1.1 cm  LVIDd 5.8         Normal Values: 3.0 - 5.6 cm  LVIDs 3.2         Normal Values: 1.8 - 4.0 cm    Derived Variables:  LVMI g/m2  RWT  Fractional Short  Ejection Fraction    Doppler Peak v. AoV= (m/sec)    OBSERVATIONS:    Mitral Valve: normal, mild MR.  Aortic Valve/Aorta: normal trileaflet aortic valve.  Tricuspid Valve: normal with trace TR.  Pulmonic Valve: normal  Left Atrium: Enlarged  Right Atrium: normal  Left Ventricle: Severe concentric LVH with normal systolic function, estimated LVEF of 65%.  Right Ventricle: normal size and systolic function.  Pericardium/Pleura: no significant pleural effusion, no significant pericardial effusion.  Pulmonary/RV Pressure: Inadequate TR jet to estimate PA systolic pressure  LV Diastolic Function: Grade 2diastolic dysfunction.    Severe concentric LVH, and mild LV enlargement, with normal systolic function, estimated LVEF of 65%. Normal right ventricular size and systolic function. Grade 2diastolic dysfunction. Left atrial enlargement The aortic root is normal in size. The mitral valve is structurally normal, mild MR. The aortic valve is trileaflet without stenosis or insufficiency. Trace physiologic TR. Inadequate TR jet to estimate PA systolic pressure No significant pericardial effusion.                  JOVANNI WACHSMAN M.D., ATTENDING CARDIOLOGIST  This document has been electronically signed. Dec 25 2019 11:45AM

## 2024-03-26 NOTE — PROGRESS NOTE ADULT - SUBJECTIVE AND OBJECTIVE BOX
Date/Time Patient Seen:  		  Referring MD:   Data Reviewed	       Patient is a 53y old  Male who presents with a chief complaint of fever (25 Mar 2024 11:45)      Subjective/HPI     PAST MEDICAL & SURGICAL HISTORY:  No pertinent past medical history    Diabetes    No pertinent past medical history    Diabetes mellitus with no complication    Afib    Hypertension    BPH (benign prostatic hyperplasia)    Perforated gastric ulcer  s/p emergent ex-lap omentopexy and plication 6/2019    Pulmonary embolism    History of non-ST elevation myocardial infarction (NSTEMI)    Osteomyelitis  s/p debridement    CAD S/P percutaneous coronary angioplasty    Cerebrovascular accident    No significant past surgical history    No significant past surgical history    H/O abdominal surgery    Perforated gastric ulcer    Traumatic amputation of left foot, initial encounter          Medication list         MEDICATIONS  (STANDING):  ascorbic acid 500 milliGRAM(s) Oral daily  atorvastatin 40 milliGRAM(s) Oral at bedtime  bisacodyl 10 milliGRAM(s) Oral daily  cholecalciferol 1000 Unit(s) Oral daily  dextrose 5%. 1000 milliLiter(s) (100 mL/Hr) IV Continuous <Continuous>  dextrose 5%. 1000 milliLiter(s) (50 mL/Hr) IV Continuous <Continuous>  dextrose 50% Injectable 25 Gram(s) IV Push once  dextrose 50% Injectable 12.5 Gram(s) IV Push once  dextrose 50% Injectable 25 Gram(s) IV Push once  diltiazem    milliGRAM(s) Oral daily  ertapenem  IVPB 1000 milliGRAM(s) IV Intermittent every 24 hours  ferrous    sulfate 325 milliGRAM(s) Oral two times a day  furosemide    Tablet 40 milliGRAM(s) Oral two times a day  gabapentin 300 milliGRAM(s) Oral every 12 hours  glucagon  Injectable 1 milliGRAM(s) IntraMuscular once  insulin glargine Injectable (LANTUS) 15 Unit(s) SubCutaneous every morning  insulin glargine Injectable (LANTUS) 7 Unit(s) SubCutaneous at bedtime  insulin lispro (ADMELOG) corrective regimen sliding scale   SubCutaneous three times a day before meals  insulin lispro (ADMELOG) corrective regimen sliding scale   SubCutaneous at bedtime  insulin lispro Injectable (ADMELOG) 5 Unit(s) SubCutaneous three times a day before meals  loratadine 10 milliGRAM(s) Oral daily  melatonin 5 milliGRAM(s) Oral at bedtime  metoclopramide 5 milliGRAM(s) Oral three times a day  multivitamin/minerals 1 Tablet(s) Oral daily  naloxegol 25 milliGRAM(s) Oral daily  oxybutynin 5 milliGRAM(s) Oral two times a day  oxyCODONE    IR 10 milliGRAM(s) Oral two times a day  pantoprazole    Tablet 40 milliGRAM(s) Oral before breakfast  polyethylene glycol 3350 17 Gram(s) Oral two times a day  saccharomyces boulardii 250 milliGRAM(s) Oral two times a day  senna 2 Tablet(s) Oral at bedtime  sodium chloride 0.65% Nasal 1 Spray(s) Both Nostrils daily  sucralfate 1 Gram(s) Oral two times a day  tiotropium 2.5 MICROgram(s) Inhaler 2 Puff(s) Inhalation daily    MEDICATIONS  (PRN):  acetaminophen     Tablet .. 650 milliGRAM(s) Oral every 6 hours PRN Temp greater or equal to 38C (100.4F), Mild Pain (1 - 3)  albuterol    0.083% 2.5 milliGRAM(s) Nebulizer every 4 hours PRN Shortness of Breath and/or Wheezing  aluminum hydroxide/magnesium hydroxide/simethicone Suspension 30 milliLiter(s) Oral every 4 hours PRN Dyspepsia  dextrose Oral Gel 15 Gram(s) Oral once PRN Blood Glucose LESS THAN 70 milliGRAM(s)/deciliter  ondansetron Injectable 4 milliGRAM(s) IV Push every 6 hours PRN Nausea and/or Vomiting         Vitals log        ICU Vital Signs Last 24 Hrs  T(C): 36.7 (26 Mar 2024 05:17), Max: 36.9 (25 Mar 2024 12:02)  T(F): 98 (26 Mar 2024 05:17), Max: 98.5 (25 Mar 2024 12:02)  HR: 78 (26 Mar 2024 05:17) (78 - 93)  BP: 125/84 (26 Mar 2024 05:17) (111/73 - 125/84)  BP(mean): --  ABP: --  ABP(mean): --  RR: 18 (26 Mar 2024 05:17) (18 - 18)  SpO2: 95% (26 Mar 2024 05:17) (92% - 95%)    O2 Parameters below as of 25 Mar 2024 20:30  Patient On (Oxygen Delivery Method): room air                 Input and Output:  I&O's Detail    24 Mar 2024 07:01  -  25 Mar 2024 07:00  --------------------------------------------------------  IN:  Total IN: 0 mL    OUT:    Voided (mL): 1700 mL  Total OUT: 1700 mL    Total NET: -1700 mL      25 Mar 2024 07:01  -  26 Mar 2024 05:59  --------------------------------------------------------  IN:  Total IN: 0 mL    OUT:    Voided (mL): 650 mL  Total OUT: 650 mL    Total NET: -650 mL          Lab Data                        13.3   10.44 )-----------( 300      ( 25 Mar 2024 07:55 )             41.5     03-25    145  |  107  |  18  ----------------------------<  112<H>  3.3<L>   |  30  |  1.10    Ca    8.7      25 Mar 2024 07:55  Mg     1.9     03-25              Review of Systems	      Objective     Physical Examination  heart s1s2  lung dc BS  head nc        Pertinent Lab findings & Imaging      Ale:  NO   Adequate UO     I&O's Detail    24 Mar 2024 07:01  -  25 Mar 2024 07:00  --------------------------------------------------------  IN:  Total IN: 0 mL    OUT:    Voided (mL): 1700 mL  Total OUT: 1700 mL    Total NET: -1700 mL      25 Mar 2024 07:01  -  26 Mar 2024 05:59  --------------------------------------------------------  IN:  Total IN: 0 mL    OUT:    Voided (mL): 650 mL  Total OUT: 650 mL    Total NET: -650 mL               Discussed with:     Cultures:	        Radiology

## 2024-03-26 NOTE — PROGRESS NOTE ADULT - ASSESSMENT
53M with PMH of AF on Eliquis, indwelling jacques, CAD s/p stents, CVA, DM, HTN, osteomyelitis s/p debridement, PE, sent from [facility] for elevated BP, SOB, and dislodged jacques    AF  Emphysema  Atelectasis  CAD  CVA  DM  HTN  OP  OA  hx of OM  hx of PE    remains on ABX  vs noted  midline in    ct neg for pe - atelectasis - emphysema  spiriva - nebs prn - VBG  cardio eval in progress  cvs rx regimen  proBNP noted to be elevated  diuresis - as per cardio recs  TTE reviewed  AF management  rate and rhythm control  GERARDO eval as outpatient  monitored unit admission  pulse ox monitoring  goal sat > 88 pct  DM care  serial FS  OLD records reviewed

## 2024-03-26 NOTE — PROGRESS NOTE ADULT - SUBJECTIVE AND OBJECTIVE BOX
Date of Service: 03-26-24 @ 11:28    Patient is a 53y old  Male who presents with a chief complaint of fever (25 Mar 2024 11:45)      INTERVAL HPI/OVERNIGHT EVENTS: Patient seen and examined. NAD. No complaints.    Vital Signs Last 24 Hrs  T(C): 36.7 (26 Mar 2024 05:17), Max: 36.9 (25 Mar 2024 12:02)  T(F): 98 (26 Mar 2024 05:17), Max: 98.5 (25 Mar 2024 12:02)  HR: 78 (26 Mar 2024 05:17) (78 - 93)  BP: 125/84 (26 Mar 2024 05:17) (111/73 - 125/84)  BP(mean): --  RR: 18 (26 Mar 2024 05:17) (18 - 18)  SpO2: 95% (26 Mar 2024 05:17) (92% - 95%)    Parameters below as of 25 Mar 2024 20:30  Patient On (Oxygen Delivery Method): room air        03-26    143  |  104  |  19  ----------------------------<  178<H>  4.1   |  32<H>  |  1.20    Ca    8.8      26 Mar 2024 07:20  Mg     2.0     03-26                            13.5   10.72 )-----------( 304      ( 26 Mar 2024 07:20 )             41.7       CAPILLARY BLOOD GLUCOSE      POCT Blood Glucose.: 167 mg/dL (26 Mar 2024 08:06)  POCT Blood Glucose.: 163 mg/dL (25 Mar 2024 21:27)  POCT Blood Glucose.: 113 mg/dL (25 Mar 2024 16:53)  POCT Blood Glucose.: 116 mg/dL (25 Mar 2024 12:06)    Urinalysis Basic - ( 26 Mar 2024 07:20 )    Color: x / Appearance: x / SG: x / pH: x  Gluc: 178 mg/dL / Ketone: x  / Bili: x / Urobili: x   Blood: x / Protein: x / Nitrite: x   Leuk Esterase: x / RBC: x / WBC x   Sq Epi: x / Non Sq Epi: x / Bacteria: x              acetaminophen     Tablet .. 650 milliGRAM(s) Oral every 6 hours PRN  albuterol    0.083% 2.5 milliGRAM(s) Nebulizer every 4 hours PRN  aluminum hydroxide/magnesium hydroxide/simethicone Suspension 30 milliLiter(s) Oral every 4 hours PRN  ascorbic acid 500 milliGRAM(s) Oral daily  atorvastatin 40 milliGRAM(s) Oral at bedtime  bisacodyl 10 milliGRAM(s) Oral daily  cholecalciferol 1000 Unit(s) Oral daily  dextrose 5%. 1000 milliLiter(s) IV Continuous <Continuous>  dextrose 5%. 1000 milliLiter(s) IV Continuous <Continuous>  dextrose 50% Injectable 25 Gram(s) IV Push once  dextrose 50% Injectable 12.5 Gram(s) IV Push once  dextrose 50% Injectable 25 Gram(s) IV Push once  dextrose Oral Gel 15 Gram(s) Oral once PRN  diltiazem    milliGRAM(s) Oral daily  ertapenem  IVPB 1000 milliGRAM(s) IV Intermittent once  ferrous    sulfate 325 milliGRAM(s) Oral two times a day  furosemide    Tablet 40 milliGRAM(s) Oral two times a day  gabapentin 300 milliGRAM(s) Oral every 12 hours  glucagon  Injectable 1 milliGRAM(s) IntraMuscular once  insulin glargine Injectable (LANTUS) 15 Unit(s) SubCutaneous every morning  insulin glargine Injectable (LANTUS) 7 Unit(s) SubCutaneous at bedtime  insulin lispro (ADMELOG) corrective regimen sliding scale   SubCutaneous three times a day before meals  insulin lispro (ADMELOG) corrective regimen sliding scale   SubCutaneous at bedtime  insulin lispro Injectable (ADMELOG) 5 Unit(s) SubCutaneous three times a day before meals  loratadine 10 milliGRAM(s) Oral daily  melatonin 5 milliGRAM(s) Oral at bedtime  metoclopramide 5 milliGRAM(s) Oral three times a day  multivitamin/minerals 1 Tablet(s) Oral daily  naloxegol 25 milliGRAM(s) Oral daily  ondansetron Injectable 4 milliGRAM(s) IV Push every 6 hours PRN  oxybutynin 5 milliGRAM(s) Oral two times a day  oxyCODONE    IR 10 milliGRAM(s) Oral two times a day  pantoprazole    Tablet 40 milliGRAM(s) Oral before breakfast  polyethylene glycol 3350 17 Gram(s) Oral two times a day  saccharomyces boulardii 250 milliGRAM(s) Oral two times a day  senna 2 Tablet(s) Oral at bedtime  sodium chloride 0.65% Nasal 1 Spray(s) Both Nostrils daily  sucralfate 1 Gram(s) Oral two times a day  tiotropium 2.5 MICROgram(s) Inhaler 2 Puff(s) Inhalation daily              REVIEW OF SYSTEMS:  CONSTITUTIONAL: No fever, no weight loss, or no fatigue  NECK: No pain, no stiffness  RESPIRATORY: No cough, no wheezing, no chills, no hemoptysis, No shortness of breath  CARDIOVASCULAR: No chest pain, no palpitations, no dizziness, no leg swelling  GASTROINTESTINAL: No abdominal pain. No nausea, no vomiting, no hematemesis; No diarrhea, no constipation. No melena, no hematochezia.  GENITOURINARY: No dysuria, no frequency, no hematuria, no incontinence  NEUROLOGICAL: No headaches, no loss of strength, no numbness, no tremors  SKIN: No itching, no burning  MUSCULOSKELETAL: No joint pain, no swelling; No muscle, no back, no extremity pain  PSYCHIATRIC: No depression, no mood swings,   HEME/LYMPH: No easy bruising, no bleeding gums  ALLERY AND IMMUNOLOGIC: No hives       Consultant(s) Notes Reviewed:  [X] YES  [ ] NO    PHYSICAL EXAM:  GENERAL: NAD  HEAD:  Atraumatic, Normocephalic  EYES: EOMI, PERRLA, conjunctiva and sclera clear  ENMT: No tonsillar erythema, exudates, or enlargement; Moist mucous membranes  NECK: Supple, No JVD  NERVOUS SYSTEM:  Awake & alert  CHEST/LUNG: Clear to auscultation bilaterally; No rales, rhonchi, wheezing,  HEART: Regular rate and rhythm  ABDOMEN: Soft, Nontender, Nondistended; Bowel sounds present  EXTREMITIES:  No clubbing, cyanosis, or edema  LYMPH: No lymphadenopathy noted  SKIN: No rashes      Advanced care planning discussed with patient/family [X] YES   [ ] NO    Advanced care planning discussed with patient/family. Patient's health status was discussed. All appropriate changes have been made regarding patient's end-of-life care. Advanced care planning forms reviewed/discussed/completed.  20 minutes spent.

## 2024-03-27 DIAGNOSIS — R21 RASH AND OTHER NONSPECIFIC SKIN ERUPTION: ICD-10-CM

## 2024-03-27 LAB
ANION GAP SERPL CALC-SCNC: 10 MMOL/L — SIGNIFICANT CHANGE UP (ref 5–17)
BUN SERPL-MCNC: 17 MG/DL — SIGNIFICANT CHANGE UP (ref 7–23)
CALCIUM SERPL-MCNC: 8.5 MG/DL — SIGNIFICANT CHANGE UP (ref 8.5–10.1)
CHLORIDE SERPL-SCNC: 104 MMOL/L — SIGNIFICANT CHANGE UP (ref 96–108)
CO2 SERPL-SCNC: 29 MMOL/L — SIGNIFICANT CHANGE UP (ref 22–31)
CREAT SERPL-MCNC: 1 MG/DL — SIGNIFICANT CHANGE UP (ref 0.5–1.3)
EGFR: 89 ML/MIN/1.73M2 — SIGNIFICANT CHANGE UP
GLUCOSE BLDC GLUCOMTR-MCNC: 157 MG/DL — HIGH (ref 70–99)
GLUCOSE BLDC GLUCOMTR-MCNC: 195 MG/DL — HIGH (ref 70–99)
GLUCOSE BLDC GLUCOMTR-MCNC: 208 MG/DL — HIGH (ref 70–99)
GLUCOSE BLDC GLUCOMTR-MCNC: 216 MG/DL — HIGH (ref 70–99)
GLUCOSE SERPL-MCNC: 139 MG/DL — HIGH (ref 70–99)
GRAM STN FLD: ABNORMAL
GRAM STN FLD: ABNORMAL
GRAM STN FLD: SIGNIFICANT CHANGE UP
HCT VFR BLD CALC: 41 % — SIGNIFICANT CHANGE UP (ref 39–50)
HGB BLD-MCNC: 13.2 G/DL — SIGNIFICANT CHANGE UP (ref 13–17)
MAGNESIUM SERPL-MCNC: 1.9 MG/DL — SIGNIFICANT CHANGE UP (ref 1.6–2.6)
MCHC RBC-ENTMCNC: 30.2 PG — SIGNIFICANT CHANGE UP (ref 27–34)
MCHC RBC-ENTMCNC: 32.2 GM/DL — SIGNIFICANT CHANGE UP (ref 32–36)
MCV RBC AUTO: 93.8 FL — SIGNIFICANT CHANGE UP (ref 80–100)
NRBC # BLD: 0 /100 WBCS — SIGNIFICANT CHANGE UP (ref 0–0)
PLATELET # BLD AUTO: 327 K/UL — SIGNIFICANT CHANGE UP (ref 150–400)
POTASSIUM SERPL-MCNC: 3.4 MMOL/L — LOW (ref 3.5–5.3)
POTASSIUM SERPL-SCNC: 3.4 MMOL/L — LOW (ref 3.5–5.3)
RBC # BLD: 4.37 M/UL — SIGNIFICANT CHANGE UP (ref 4.2–5.8)
RBC # FLD: 12.7 % — SIGNIFICANT CHANGE UP (ref 10.3–14.5)
SODIUM SERPL-SCNC: 143 MMOL/L — SIGNIFICANT CHANGE UP (ref 135–145)
SPECIMEN SOURCE: SIGNIFICANT CHANGE UP
SPECIMEN SOURCE: SIGNIFICANT CHANGE UP
WBC # BLD: 11.88 K/UL — HIGH (ref 3.8–10.5)
WBC # FLD AUTO: 11.88 K/UL — HIGH (ref 3.8–10.5)

## 2024-03-27 PROCEDURE — 99232 SBSQ HOSP IP/OBS MODERATE 35: CPT

## 2024-03-27 RX ADMIN — Medication 5 MILLIGRAM(S): at 17:33

## 2024-03-27 RX ADMIN — Medication 40 MILLIGRAM(S): at 15:45

## 2024-03-27 RX ADMIN — Medication 250 MILLIGRAM(S): at 17:33

## 2024-03-27 RX ADMIN — GABAPENTIN 300 MILLIGRAM(S): 400 CAPSULE ORAL at 17:33

## 2024-03-27 RX ADMIN — Medication 40 MILLIGRAM(S): at 06:20

## 2024-03-27 RX ADMIN — Medication 10 MILLIGRAM(S): at 11:52

## 2024-03-27 RX ADMIN — PANTOPRAZOLE SODIUM 40 MILLIGRAM(S): 20 TABLET, DELAYED RELEASE ORAL at 06:22

## 2024-03-27 RX ADMIN — Medication 81 MILLIGRAM(S): at 11:52

## 2024-03-27 RX ADMIN — POLYETHYLENE GLYCOL 3350 17 GRAM(S): 17 POWDER, FOR SOLUTION ORAL at 06:20

## 2024-03-27 RX ADMIN — GABAPENTIN 300 MILLIGRAM(S): 400 CAPSULE ORAL at 06:21

## 2024-03-27 RX ADMIN — Medication 5 MILLIGRAM(S): at 06:22

## 2024-03-27 RX ADMIN — Medication 5 MILLIGRAM(S): at 06:20

## 2024-03-27 RX ADMIN — Medication 1000 UNIT(S): at 11:52

## 2024-03-27 RX ADMIN — TIOTROPIUM BROMIDE 2 PUFF(S): 18 CAPSULE ORAL; RESPIRATORY (INHALATION) at 08:30

## 2024-03-27 RX ADMIN — Medication 500 MILLIGRAM(S): at 11:53

## 2024-03-27 RX ADMIN — ATORVASTATIN CALCIUM 40 MILLIGRAM(S): 80 TABLET, FILM COATED ORAL at 21:29

## 2024-03-27 RX ADMIN — OXYCODONE HYDROCHLORIDE 10 MILLIGRAM(S): 5 TABLET ORAL at 06:21

## 2024-03-27 RX ADMIN — APIXABAN 5 MILLIGRAM(S): 2.5 TABLET, FILM COATED ORAL at 06:21

## 2024-03-27 RX ADMIN — Medication 1 GRAM(S): at 06:23

## 2024-03-27 RX ADMIN — Medication 5 MILLIGRAM(S): at 21:29

## 2024-03-27 RX ADMIN — Medication 5 UNIT(S): at 12:29

## 2024-03-27 RX ADMIN — Medication 2: at 08:21

## 2024-03-27 RX ADMIN — Medication 5 UNIT(S): at 08:23

## 2024-03-27 RX ADMIN — Medication 1 GRAM(S): at 20:46

## 2024-03-27 RX ADMIN — LORATADINE 10 MILLIGRAM(S): 10 TABLET ORAL at 11:52

## 2024-03-27 RX ADMIN — Medication 250 MILLIGRAM(S): at 06:20

## 2024-03-27 RX ADMIN — OXYCODONE HYDROCHLORIDE 10 MILLIGRAM(S): 5 TABLET ORAL at 17:33

## 2024-03-27 RX ADMIN — Medication 325 MILLIGRAM(S): at 17:33

## 2024-03-27 RX ADMIN — Medication 20 MILLIGRAM(S): at 15:45

## 2024-03-27 RX ADMIN — Medication 325 MILLIGRAM(S): at 06:21

## 2024-03-27 RX ADMIN — NALOXEGOL OXALATE 25 MILLIGRAM(S): 12.5 TABLET, FILM COATED ORAL at 11:53

## 2024-03-27 RX ADMIN — Medication 5 UNIT(S): at 17:32

## 2024-03-27 RX ADMIN — INSULIN GLARGINE 7 UNIT(S): 100 INJECTION, SOLUTION SUBCUTANEOUS at 21:28

## 2024-03-27 RX ADMIN — APIXABAN 5 MILLIGRAM(S): 2.5 TABLET, FILM COATED ORAL at 17:33

## 2024-03-27 RX ADMIN — Medication 4: at 12:28

## 2024-03-27 RX ADMIN — SENNA PLUS 2 TABLET(S): 8.6 TABLET ORAL at 21:29

## 2024-03-27 RX ADMIN — Medication 360 MILLIGRAM(S): at 06:20

## 2024-03-27 RX ADMIN — Medication 5 MILLIGRAM(S): at 15:44

## 2024-03-27 RX ADMIN — Medication 1 TABLET(S): at 11:52

## 2024-03-27 RX ADMIN — Medication 4: at 17:32

## 2024-03-27 NOTE — PROGRESS NOTE ADULT - SUBJECTIVE AND OBJECTIVE BOX
53y year old Male seen at [facility] TELE 317 W1 s/p right heel wound debridement and bone biopsy. Patient relates no overnight events and states that they are doing well at this time.  Denies any fever, chills, nausea, vomiting, chest pain, shortness of breath, or calf pain at this time.    Allergies    No Known Drug Allergies  fish (Hives)    Intolerances        MEDICATIONS  (STANDING):  apixaban 5 milliGRAM(s) Oral every 12 hours  ascorbic acid 500 milliGRAM(s) Oral daily  aspirin  chewable 81 milliGRAM(s) Oral daily  atorvastatin 40 milliGRAM(s) Oral at bedtime  bisacodyl 10 milliGRAM(s) Oral daily  cholecalciferol 1000 Unit(s) Oral daily  dextrose 5%. 1000 milliLiter(s) (50 mL/Hr) IV Continuous <Continuous>  dextrose 5%. 1000 milliLiter(s) (100 mL/Hr) IV Continuous <Continuous>  dextrose 50% Injectable 12.5 Gram(s) IV Push once  dextrose 50% Injectable 25 Gram(s) IV Push once  diltiazem    milliGRAM(s) Oral daily  ferrous    sulfate 325 milliGRAM(s) Oral two times a day  furosemide    Tablet 40 milliGRAM(s) Oral two times a day  gabapentin 300 milliGRAM(s) Oral every 12 hours  glucagon  Injectable 1 milliGRAM(s) IntraMuscular once  insulin glargine Injectable (LANTUS) 7 Unit(s) SubCutaneous at bedtime  insulin lispro (ADMELOG) corrective regimen sliding scale   SubCutaneous at bedtime  insulin lispro (ADMELOG) corrective regimen sliding scale   SubCutaneous three times a day before meals  insulin lispro Injectable (ADMELOG) 5 Unit(s) SubCutaneous three times a day before meals  loratadine 10 milliGRAM(s) Oral daily  melatonin 5 milliGRAM(s) Oral at bedtime  metoclopramide 5 milliGRAM(s) Oral three times a day  multivitamin/minerals 1 Tablet(s) Oral daily  naloxegol 25 milliGRAM(s) Oral daily  oxybutynin 5 milliGRAM(s) Oral two times a day  oxyCODONE    IR 10 milliGRAM(s) Oral two times a day  pantoprazole    Tablet 40 milliGRAM(s) Oral before breakfast  polyethylene glycol 3350 17 Gram(s) Oral two times a day  predniSONE   Tablet 20 milliGRAM(s) Oral daily  saccharomyces boulardii 250 milliGRAM(s) Oral two times a day  senna 2 Tablet(s) Oral at bedtime  sodium chloride 0.65% Nasal 1 Spray(s) Both Nostrils daily  sucralfate 1 Gram(s) Oral two times a day  tiotropium 2.5 MICROgram(s) Inhaler 2 Puff(s) Inhalation daily    MEDICATIONS  (PRN):  acetaminophen     Tablet .. 650 milliGRAM(s) Oral every 6 hours PRN Temp greater or equal to 38C (100.4F), Mild Pain (1 - 3)  aluminum hydroxide/magnesium hydroxide/simethicone Suspension 30 milliLiter(s) Oral every 4 hours PRN Dyspepsia  dextrose Oral Gel 15 Gram(s) Oral once PRN Blood Glucose LESS THAN 70 milliGRAM(s)/deciliter  ondansetron Injectable 4 milliGRAM(s) IV Push every 6 hours PRN Nausea and/or Vomiting      Vital Signs Last 24 Hrs  T(C): 36.7 (27 Mar 2024 12:28), Max: 37.2 (26 Mar 2024 14:43)  T(F): 98.1 (27 Mar 2024 12:28), Max: 99 (26 Mar 2024 14:43)  HR: 89 (27 Mar 2024 12:28) (66 - 145)  BP: 125/71 (27 Mar 2024 12:28) (109/64 - 146/67)  BP(mean): --  RR: 18 (27 Mar 2024 12:28) (12 - 22)  SpO2: 93% (27 Mar 2024 12:28) (91% - 98%)    Parameters below as of 27 Mar 2024 12:28  Patient On (Oxygen Delivery Method): room air        PHYSICAL EXAM:  Vascular: Weakly palpable pulses bilateral   Neurologic: IDDM with neuropathy   Muskculoskeletal: bilateral cavus foot deformity   Dermatological: Incision site of the -right heel healthy granulating tissue, mild winston-incision erythema, no proximal streaking, no fluctuance, no malodor, no signs of infection at this time.                          13.2   11.88 )-----------( 327      ( 27 Mar 2024 06:12 )             41.0       03-27    143  |  104  |  17  ----------------------------<  139<H>  3.4<L>   |  29  |  1.00    Ca    8.5      27 Mar 2024 06:12  Mg     1.9     03-27              Culture - Tissue with Gram Stain (collected 26 Mar 2024 20:10)  Source: .Tissue Other, RIGHT HEEL BONE  Gram Stain (27 Mar 2024 04:04):    No polymorphonuclear cells seen per low power field    No organisms seen per oil power field    Culture - Tissue with Gram Stain (collected 26 Mar 2024 20:10)  Source: .Tissue Other, RIGHT HEEL TISSUE  Gram Stain (27 Mar 2024 04:05):    Rare polymorphonuclear leukocytes seen per low power field    Few Gram positive cocci in pairs seen per oil power field        Imaging: ----------

## 2024-03-27 NOTE — PROGRESS NOTE ADULT - PROBLEM SELECTOR PLAN 3
S/P selective debridement of R heal with bone bx -- POD #1  Continue post-op care  F/U intra-op cultures and cytopath  Podiatry/ID f/u

## 2024-03-27 NOTE — PROGRESS NOTE ADULT - SUBJECTIVE AND OBJECTIVE BOX
[facility] -- Brittany Lutz, Reynaldo Ku Savella, Francesco Boss: Office # [phone]    Follow Up:  Afib RVR    Subjective/Observations: Patient seen and examined. Patient awake, alert, resting in bed. No complaints of chest pain, dyspnea, palpitations or dizziness. No signs of orthopnea or PND. Tolerating room air. S/p OR, Tolerated procedure well, cardiac status optimal post operatively     REVIEW OF SYSTEMS: All other review of systems are negative unless indicated above    PAST MEDICAL & SURGICAL HISTORY:  Diabetes      Diabetes mellitus with no complication      Afib      Hypertension      Hypertension      BPH (benign prostatic hyperplasia)      Perforated gastric ulcer  s/p emergent ex-lap omentopexy and plication 6/2019      Pulmonary embolism      History of non-ST elevation myocardial infarction (NSTEMI)      Osteomyelitis  s/p debridement      CAD S/P percutaneous coronary angioplasty      Cerebrovascular accident      H/O abdominal surgery      Perforated gastric ulcer      Traumatic amputation of left foot, initial encounter    MEDICATIONS  (STANDING):  apixaban 5 milliGRAM(s) Oral every 12 hours  ascorbic acid 500 milliGRAM(s) Oral daily  aspirin  chewable 81 milliGRAM(s) Oral daily  atorvastatin 40 milliGRAM(s) Oral at bedtime  bisacodyl 10 milliGRAM(s) Oral daily  cholecalciferol 1000 Unit(s) Oral daily  dextrose 5%. 1000 milliLiter(s) (100 mL/Hr) IV Continuous <Continuous>  dextrose 5%. 1000 milliLiter(s) (50 mL/Hr) IV Continuous <Continuous>  dextrose 50% Injectable 25 Gram(s) IV Push once  dextrose 50% Injectable 12.5 Gram(s) IV Push once  diltiazem    milliGRAM(s) Oral daily  ferrous    sulfate 325 milliGRAM(s) Oral two times a day  furosemide    Tablet 40 milliGRAM(s) Oral two times a day  gabapentin 300 milliGRAM(s) Oral every 12 hours  glucagon  Injectable 1 milliGRAM(s) IntraMuscular once  insulin glargine Injectable (LANTUS) 7 Unit(s) SubCutaneous at bedtime  insulin lispro (ADMELOG) corrective regimen sliding scale   SubCutaneous three times a day before meals  insulin lispro (ADMELOG) corrective regimen sliding scale   SubCutaneous at bedtime  insulin lispro Injectable (ADMELOG) 5 Unit(s) SubCutaneous three times a day before meals  loratadine 10 milliGRAM(s) Oral daily  melatonin 5 milliGRAM(s) Oral at bedtime  metoclopramide 5 milliGRAM(s) Oral three times a day  multivitamin/minerals 1 Tablet(s) Oral daily  naloxegol 25 milliGRAM(s) Oral daily  oxybutynin 5 milliGRAM(s) Oral two times a day  oxyCODONE    IR 10 milliGRAM(s) Oral two times a day  pantoprazole    Tablet 40 milliGRAM(s) Oral before breakfast  polyethylene glycol 3350 17 Gram(s) Oral two times a day  saccharomyces boulardii 250 milliGRAM(s) Oral two times a day  senna 2 Tablet(s) Oral at bedtime  sodium chloride 0.65% Nasal 1 Spray(s) Both Nostrils daily  sucralfate 1 Gram(s) Oral two times a day  tiotropium 2.5 MICROgram(s) Inhaler 2 Puff(s) Inhalation daily    MEDICATIONS  (PRN):  acetaminophen     Tablet .. 650 milliGRAM(s) Oral every 6 hours PRN Temp greater or equal to 38C (100.4F), Mild Pain (1 - 3)  aluminum hydroxide/magnesium hydroxide/simethicone Suspension 30 milliLiter(s) Oral every 4 hours PRN Dyspepsia  dextrose Oral Gel 15 Gram(s) Oral once PRN Blood Glucose LESS THAN 70 milliGRAM(s)/deciliter  ondansetron Injectable 4 milliGRAM(s) IV Push every 6 hours PRN Nausea and/or Vomiting    Allergies  No Known Drug Allergies  fish (Hives)    Vital Signs Last 24 Hrs  T(C): 36.8 (27 Mar 2024 05:14), Max: 37.2 (26 Mar 2024 14:43)  T(F): 98.2 (27 Mar 2024 05:14), Max: 99 (26 Mar 2024 14:43)  HR: 93 (27 Mar 2024 05:14) (66 - 145)  BP: 146/67 (27 Mar 2024 05:14) (102/66 - 146/67)  BP(mean): --  RR: 20 (27 Mar 2024 05:14) (12 - 22)  SpO2: 91% (27 Mar 2024 05:14) (91% - 98%)    Parameters below as of 27 Mar 2024 05:14  Patient On (Oxygen Delivery Method): BiPAP/CPAP      I&O's Summary    26 Mar 2024 07:01  -  27 Mar 2024 07:00  --------------------------------------------------------  IN: 240 mL / OUT: 650 mL / NET: -410 mL      Weight (kg): 111.1 (03-26 @ 19:13)    TELE: A fib 70-11-s 3 sec pause x 1   PHYSICAL EXAM:  Constitutional: NAD, awake and alert  HEENT: Moist Mucous Membranes, Anicteric  Pulmonary: Non-labored, breath sounds are clear bilaterally, No wheezing, rales or rhonchi  Cardiovascular: Regular, S1 and S2, No murmurs, No rubs, gallops or clicks  Gastrointestinal:  soft, nontender, nondistended   Lymph: trace peripheral edema. No lymphadenopathy.   Skin: No visible rashes, Right foot DSD intact.   Psych:  Mood & affect appropriate      LABS: All Labs Reviewed:                        13.2   11.88 )-----------( 327      ( 27 Mar 2024 06:12 )             41.0                         13.5   10.72 )-----------( 304      ( 26 Mar 2024 07:20 )             41.7                         13.3   10.44 )-----------( 300      ( 25 Mar 2024 07:55 )             41.5     27 Mar 2024 06:12    143    |  104    |  17     ----------------------------<  139    3.4     |  29     |  1.00   26 Mar 2024 07:20    143    |  104    |  19     ----------------------------<  178    4.1     |  32     |  1.20   25 Mar 2024 07:55    145    |  107    |  18     ----------------------------<  112    3.3     |  30     |  1.10     Ca    8.5        27 Mar 2024 06:12  Ca    8.8        26 Mar 2024 07:20  Ca    8.7        25 Mar 2024 07:55  Mg     1.9       27 Mar 2024 06:12  Mg     2.0       26 Mar 2024 07:20  Mg     1.9       25 Mar 2024 07:55       Triiodothyronine, Total (T3 Total): 85 ng/dL (03-17-24 @ 06:40)    12 Lead ECG:   Ventricular Rate 154 BPM    QRS Duration 76 ms    Q-T Interval 282 ms    QTC Calculation(Bazett) 451 ms    R Axis 15 degrees    T Axis 194 degrees    Diagnosis Line *** Critical Test Result: High HR  Atrial fibrillation with rapid ventricular response  Low voltage QRS  Nonspecific ST and T wave abnormality    Confirmed by JOHN KU ([id number]) on 3/17/2024 7:30:13 AM (03-16-24 @ 23:50)      TRANSTHORACIC ECHOCARDIOGRAM REPORT  ________________________________________________________________________________                                      _______       Pt. Name:       SARAH MORRISON Study Date:    3/18/2024  MRN:            [id number]         YOB: 1970  Accession #:    [id number]        Age:           53 years  Account#:       [id number]       Gender:        M  Heart Rate:                      Height:        74.02 in (188.00 cm)  Rhythm:                          Weight:        244.71 lb (111.00 kg)  Blood Pressure: 100/60 mmHg      BSA/BMI:       2.37 m² / 31.41 kg/m²  ________________________________________________________________________________________  Referring Physician:    [id number] Hill Brizuela  Interpreting Physician: Mary Maya MD  Primary Sonographer:    Mounika FELDMAN    CPT:               ECHO TTE WO CON COMP W DOPP - 57829.m  Indication(s):     Dyspnea, unspecified - R06.00  Procedure:         Transthoracic echocardiogram with 2-D, M-mode and complete                     spectral and color flow Doppler.  Ordering Location: [facility]  Admission Status:  Inpatient  Study Information: Image quality for this study is technically difficult.    _______________________________________________________________________________________     CONCLUSIONS:      1. Technically difficult image quality.   2. Left ventricular cavity is normal in size. Left ventricular wall thickness is normal. Left ventricular systolic function is normal with an ejection fraction visually estimated at 50 to 55 %.   3. Normal right ventricular cavity size, with normal wall thickness, and mildly reduced systolic function.   4. The left atrium is moderately dilated.   5. The right atrium is moderately dilated.   6. Moderate to severe mitral regurgitation.   7. Mild to moderate tricuspid regurgitation.   8. Estimated pulmonary artery systolic pressure is 36 mmHg, consistent with mild pulmonary hypertension.   9. The inferior vena cava is dilated measuring 2.40 cm in diameter, (dilated >2.1cm) with normal inspiratory collapse (normal >50%) consistent with mildly elevated right atrial pressure (~8, range 5-10mmHg).  10. Trace pericardial effusion.    ________________________________________________________________________________________  FINDINGS:     Left Ventricle:  The left ventricular cavity is normal in size. Left ventricular wall thickness is normal. Left ventricular systolic function is normal with an ejection fraction visually estimated at 50 to 55%.The left ventricular diastolic function is indeterminate.     Right Ventricle:  The right ventricular cavity is normal in size, with normal wall thickness and mildly reduced systolic function.     Left Atrium:  The left atrium is moderately dilated.     Right Atrium:  The right atrium is moderately dilated.     Aortic Valve:  The aortic valve anatomy cannot be determined with normal systolic excursion. There is no aortic valve stenosis.     Mitral Valve:  There is mild calcification of the mitralvalve annulus. There is moderate to severe mitral regurgitation.     Tricuspid Valve:  Structurally normal tricuspid valve with normal leaflet excursion. There is mild to moderate tricuspid regurgitation. Estimated pulmonary artery systolic pressure is 36 mmHg, consistent with mild pulmonary hypertension.     Pulmonic Valve:  The pulmonic valve was not well visualized. There is trace pulmonic regurgitation.     Pericardium:  There is a trace pericardial effusion.     Systemic Veins:  The inferiorvena cava is dilated measuring 2.40 cm in diameter, (dilated >2.1cm) with normal inspiratory collapse (normal >50%) consistent with mildly elevated right atrial pressure (~8, range 5-10mmHg).  ____________________________________________________________________  QUANTITATIVE DATA:  Left Ventricle Measurements: (Indexed to BSA)     IVSd (2D):   1.9 cm  LVPWd (2D):  1.5 cm  LVIDd (2D):  5.3 cm  LVIDs (2D):  3.9 cm  LV Mass:     413 g  174.4 g/m²  Visualized LV EF%: 50 to 55%     MV E Vmax:    1.15 m/s  e' lateral:   10.90 cm/s  e' medial:    10.50 cm/s  E/e' lateral: 10.56  E/e' medial:  12.00  E/e' Average: 10.76  MV DT:        137 msec    Aorta Measurements: (Normal range) (Indexed to BSA)     Sinuses of Valsalva: 3.50 cm (3.1 - 3.7 cm)       Left Atrium Measurements: (Indexed to BSA)  LA Diam 2D: 3.99 cm       LVOT / RVOT/ Qp/Qs Data: (Indexed to BSA)  LVOT Diameter: 2.22 cm    Mitral Valve Measurements:     MV E Vmax: 1.2 m/s       Tricuspid Valve Measurements:     TR Vmax:          2.6m/s  TR Peak Gradient: 27.7 mmHg  RA Pressure:      8 mmHg  PASP:             36 mmHg    ________________________________________________________________________________________  Electronically signed on 3/19/2024 at 11:27:59 AM by Mary Maya MD         *** Final ***

## 2024-03-27 NOTE — PROGRESS NOTE ADULT - SUBJECTIVE AND OBJECTIVE BOX
Date/Time Patient Seen:  		  Referring MD:   Data Reviewed	       Patient is a 53y old  Male who presents with a chief complaint of fever (26 Mar 2024 11:27)      Subjective/HPI     PAST MEDICAL & SURGICAL HISTORY:  No pertinent past medical history    Diabetes    No pertinent past medical history    Diabetes mellitus with no complication    Afib    Hypertension    BPH (benign prostatic hyperplasia)    Perforated gastric ulcer  s/p emergent ex-lap omentopexy and plication 6/2019    Pulmonary embolism    History of non-ST elevation myocardial infarction (NSTEMI)    Osteomyelitis  s/p debridement    CAD S/P percutaneous coronary angioplasty    Cerebrovascular accident    No significant past surgical history    No significant past surgical history    H/O abdominal surgery    Perforated gastric ulcer    Traumatic amputation of left foot, initial encounter          Medication list         MEDICATIONS  (STANDING):  apixaban 5 milliGRAM(s) Oral every 12 hours  ascorbic acid 500 milliGRAM(s) Oral daily  aspirin  chewable 81 milliGRAM(s) Oral daily  atorvastatin 40 milliGRAM(s) Oral at bedtime  bisacodyl 10 milliGRAM(s) Oral daily  cholecalciferol 1000 Unit(s) Oral daily  dextrose 5%. 1000 milliLiter(s) (100 mL/Hr) IV Continuous <Continuous>  dextrose 5%. 1000 milliLiter(s) (50 mL/Hr) IV Continuous <Continuous>  dextrose 50% Injectable 12.5 Gram(s) IV Push once  dextrose 50% Injectable 25 Gram(s) IV Push once  diltiazem    milliGRAM(s) Oral daily  ferrous    sulfate 325 milliGRAM(s) Oral two times a day  furosemide    Tablet 40 milliGRAM(s) Oral two times a day  gabapentin 300 milliGRAM(s) Oral every 12 hours  glucagon  Injectable 1 milliGRAM(s) IntraMuscular once  insulin glargine Injectable (LANTUS) 7 Unit(s) SubCutaneous at bedtime  insulin lispro (ADMELOG) corrective regimen sliding scale   SubCutaneous at bedtime  insulin lispro (ADMELOG) corrective regimen sliding scale   SubCutaneous three times a day before meals  insulin lispro Injectable (ADMELOG) 5 Unit(s) SubCutaneous three times a day before meals  loratadine 10 milliGRAM(s) Oral daily  melatonin 5 milliGRAM(s) Oral at bedtime  metoclopramide 5 milliGRAM(s) Oral three times a day  multivitamin/minerals 1 Tablet(s) Oral daily  naloxegol 25 milliGRAM(s) Oral daily  oxybutynin 5 milliGRAM(s) Oral two times a day  oxyCODONE    IR 10 milliGRAM(s) Oral two times a day  pantoprazole    Tablet 40 milliGRAM(s) Oral before breakfast  polyethylene glycol 3350 17 Gram(s) Oral two times a day  saccharomyces boulardii 250 milliGRAM(s) Oral two times a day  senna 2 Tablet(s) Oral at bedtime  sodium chloride 0.65% Nasal 1 Spray(s) Both Nostrils daily  sucralfate 1 Gram(s) Oral two times a day  tiotropium 2.5 MICROgram(s) Inhaler 2 Puff(s) Inhalation daily    MEDICATIONS  (PRN):  acetaminophen     Tablet .. 650 milliGRAM(s) Oral every 6 hours PRN Temp greater or equal to 38C (100.4F), Mild Pain (1 - 3)  aluminum hydroxide/magnesium hydroxide/simethicone Suspension 30 milliLiter(s) Oral every 4 hours PRN Dyspepsia  dextrose Oral Gel 15 Gram(s) Oral once PRN Blood Glucose LESS THAN 70 milliGRAM(s)/deciliter  ondansetron Injectable 4 milliGRAM(s) IV Push every 6 hours PRN Nausea and/or Vomiting         Vitals log        ICU Vital Signs Last 24 Hrs  T(C): 36.8 (27 Mar 2024 05:14), Max: 37.2 (26 Mar 2024 14:43)  T(F): 98.2 (27 Mar 2024 05:14), Max: 99 (26 Mar 2024 14:43)  HR: 93 (27 Mar 2024 05:14) (66 - 145)  BP: 146/67 (27 Mar 2024 05:14) (102/66 - 146/67)  BP(mean): --  ABP: --  ABP(mean): --  RR: 20 (27 Mar 2024 05:14) (12 - 22)  SpO2: 91% (27 Mar 2024 05:14) (91% - 98%)    O2 Parameters below as of 27 Mar 2024 05:14  Patient On (Oxygen Delivery Method): BiPAP/CPAP                 Input and Output:  I&O's Detail    25 Mar 2024 07:01  -  26 Mar 2024 07:00  --------------------------------------------------------  IN:  Total IN: 0 mL    OUT:    Voided (mL): 650 mL  Total OUT: 650 mL    Total NET: -650 mL      26 Mar 2024 07:01  -  27 Mar 2024 05:43  --------------------------------------------------------  IN:    Oral Fluid: 240 mL  Total IN: 240 mL    OUT:    Indwelling Catheter - Urethral (mL): 50 mL    Voided (mL): 600 mL  Total OUT: 650 mL    Total NET: -410 mL          Lab Data                        13.5   10.72 )-----------( 304      ( 26 Mar 2024 07:20 )             41.7     03-26    143  |  104  |  19  ----------------------------<  178<H>  4.1   |  32<H>  |  1.20    Ca    8.8      26 Mar 2024 07:20  Mg     2.0     03-26              Review of Systems	      Objective     Physical Examination    heart s1s2  lung dec BS  head nc      Pertinent Lab findings & Imaging      Ale:  NO   Adequate UO     I&O's Detail    25 Mar 2024 07:01  -  26 Mar 2024 07:00  --------------------------------------------------------  IN:  Total IN: 0 mL    OUT:    Voided (mL): 650 mL  Total OUT: 650 mL    Total NET: -650 mL      26 Mar 2024 07:01  -  27 Mar 2024 05:43  --------------------------------------------------------  IN:    Oral Fluid: 240 mL  Total IN: 240 mL    OUT:    Indwelling Catheter - Urethral (mL): 50 mL    Voided (mL): 600 mL  Total OUT: 650 mL    Total NET: -410 mL               Discussed with:     Cultures:	        Radiology

## 2024-03-27 NOTE — PROGRESS NOTE ADULT - ASSESSMENT
52 y/o M PMhx AF on Eliquis, chronic jacques, CAD s/p stents, CVA, DM, HTN, PE who presented w/ SOB, and dislodged jacques as well as dysuria.  also found to have Afib w/ RVR  admitted with  concerns for sepsis, CAUTI, dislodged jacques  reported dysuria  jacques placed in ED  CT abd/pelvis- No hydronephrosis or nephrolithiasis.    Recommendations  1-Bacteremia ESBL E coli (suspected pyelonephritis)   -  Escherichia coli: Detec   -  ESBL: Detec   -  CTX-M Resistance Marker: Detec  Culture - Urine (03.16.24 @ 22:35)  >100,000 CFU/ml Escherichia coli ESBL  Repeat blood cultures collected 3/18 5am - NGTD past 48 hours  spertapenem started 3/17 with last day 3/26    2-Foot wound open area on pressure location, now dressed, podiatry involved for this unstageable ulcer with rec  Xray-no evidence of osteo  MRI-not done with aneurysm clips so biopsy 3/26  Path-PENDING but expected Friday  Micro-Few Gram positive cocci in pairs seen per oil power field (recs to follow)  wound care    3-Rash chronic diffuse rash present for months and likely related to contact dermatitis from detergent    Thank you for consulting us and involving us in the management of this most interesting and challenging case.  We will follow along in the care of this patient. Please call us at [phone] or text me directly on my cell# at [phone] with any concerns.

## 2024-03-27 NOTE — PROVIDER CONTACT NOTE (OTHER) - RECOMMENDATIONS
MD notified. Will continue to monitor pt this shift.
DELGADO tamez was notified no new orders as this time.

## 2024-03-27 NOTE — PROVIDER CONTACT NOTE (OTHER) - SITUATION
Patient lying in bed resting, awake, alert and oriented. Patient denies any complains of being lightheaded , dizziness, Chest pain, Palpitations or SOB. asymptomatic
pt admitted with sepsis and rapid afib. pt had 8 beats of vtach per tele tech.

## 2024-03-27 NOTE — PROGRESS NOTE ADULT - ASSESSMENT
53M with PMH of AF on Eliquis, indwelling jacques, CAD s/p stents, CVA, DM, HTN, osteomyelitis s/p debridement, PE, sent from [facility] for elevated BP, SOB, and dislodged jacques    AF  Emphysema  Atelectasis  CAD  CVA  DM  HTN  OP  OA  hx of OM  hx of PE    POD 1  Podiatry f/u  right Foot Surgery    ct neg for pe - atelectasis - emphysema  spiriva - nebs prn - VBG  cardio eval in progress  cvs rx regimen  proBNP noted to be elevated  diuresis - as per cardio recs  TTE reviewed  AF management  rate and rhythm control  GERARDO eval as outpatient  monitored unit admission  pulse ox monitoring  goal sat > 88 pct  DM care  serial FS  OLD records reviewed

## 2024-03-27 NOTE — PROVIDER CONTACT NOTE (OTHER) - BACKGROUND
Patient is A-fib on monitor , Patient admitted for sepsis .
Pt admitted with sepsis and rapid afib. Pt on cardizem drip at 15 ml/hr. Pt now had 8 beats of vtach.

## 2024-03-27 NOTE — PROGRESS NOTE ADULT - SUBJECTIVE AND OBJECTIVE BOX
[facility] DIV[facility] of INFECTIOUS DISEASE  Juventino Whitten MD PhD, Symone Hickey MD, Anne-Marie Li MD, Jeffery Jacobs MD, Ryan Romeo MD  and providing coverage with Melody Armstrong MD  Providing Infectious Disease Consultations at [facility], [facility], [facility]'s    Office# [phone] to schedule follow up appointments  Answering Service for urgent calls or New Consults [phone]  Cell# to text for urgent issues Juventino Whitten [phone]     infectious diseases progress note:    SARAH MORRISON is a 53y y. o. Male patient    Overnight and events of the last 24hrs reviewed    Allergies    No Known Drug Allergies  fish (Hives)    Intolerances        ANTIBIOTICS/RELEVANT:  antimicrobials    immunologic:    OTHER:  acetaminophen     Tablet .. 650 milliGRAM(s) Oral every 6 hours PRN  aluminum hydroxide/magnesium hydroxide/simethicone Suspension 30 milliLiter(s) Oral every 4 hours PRN  apixaban 5 milliGRAM(s) Oral every 12 hours  ascorbic acid 500 milliGRAM(s) Oral daily  aspirin  chewable 81 milliGRAM(s) Oral daily  atorvastatin 40 milliGRAM(s) Oral at bedtime  bisacodyl 10 milliGRAM(s) Oral daily  cholecalciferol 1000 Unit(s) Oral daily  dextrose 5%. 1000 milliLiter(s) IV Continuous <Continuous>  dextrose 5%. 1000 milliLiter(s) IV Continuous <Continuous>  dextrose 50% Injectable 25 Gram(s) IV Push once  dextrose 50% Injectable 12.5 Gram(s) IV Push once  dextrose Oral Gel 15 Gram(s) Oral once PRN  diltiazem    milliGRAM(s) Oral daily  ferrous    sulfate 325 milliGRAM(s) Oral two times a day  furosemide    Tablet 40 milliGRAM(s) Oral two times a day  gabapentin 300 milliGRAM(s) Oral every 12 hours  glucagon  Injectable 1 milliGRAM(s) IntraMuscular once  insulin glargine Injectable (LANTUS) 7 Unit(s) SubCutaneous at bedtime  insulin lispro (ADMELOG) corrective regimen sliding scale   SubCutaneous at bedtime  insulin lispro (ADMELOG) corrective regimen sliding scale   SubCutaneous three times a day before meals  insulin lispro Injectable (ADMELOG) 5 Unit(s) SubCutaneous three times a day before meals  loratadine 10 milliGRAM(s) Oral daily  melatonin 5 milliGRAM(s) Oral at bedtime  metoclopramide 5 milliGRAM(s) Oral three times a day  multivitamin/minerals 1 Tablet(s) Oral daily  naloxegol 25 milliGRAM(s) Oral daily  ondansetron Injectable 4 milliGRAM(s) IV Push every 6 hours PRN  oxybutynin 5 milliGRAM(s) Oral two times a day  oxyCODONE    IR 10 milliGRAM(s) Oral two times a day  pantoprazole    Tablet 40 milliGRAM(s) Oral before breakfast  polyethylene glycol 3350 17 Gram(s) Oral two times a day  saccharomyces boulardii 250 milliGRAM(s) Oral two times a day  senna 2 Tablet(s) Oral at bedtime  sodium chloride 0.65% Nasal 1 Spray(s) Both Nostrils daily  sucralfate 1 Gram(s) Oral two times a day  tiotropium 2.5 MICROgram(s) Inhaler 2 Puff(s) Inhalation daily      Objective:  Vital Signs Last 24 Hrs  T(C): 36.7 (27 Mar 2024 12:28), Max: 37.2 (26 Mar 2024 14:43)  T(F): 98.1 (27 Mar 2024 12:28), Max: 99 (26 Mar 2024 14:43)  HR: 89 (27 Mar 2024 12:28) (66 - 145)  BP: 125/71 (27 Mar 2024 12:28) (109/64 - 146/67)  BP(mean): --  RR: 18 (27 Mar 2024 12:28) (12 - 22)  SpO2: 93% (27 Mar 2024 12:28) (91% - 98%)    Parameters below as of 27 Mar 2024 12:28  Patient On (Oxygen Delivery Method): room air        T(C): 36.7 (03-27-24 @ 12:28), Max: 37.2 (03-26-24 @ 14:43)  T(C): 36.7 (03-27-24 @ 12:28), Max: 37.2 (03-26-24 @ 14:43)  T(C): 36.7 (03-27-24 @ 12:28), Max: 37.3 (03-23-24 @ 20:40)    PHYSICAL EXAM:  HEENT: NC atraumatic  Neck: supple  Respiratory: no accessory muscle use, breathing comfortably  Cardiovascular: distant  Gastrointestinal: normal appearing, nondistended  Extremities: no clubbing, no cyanosis,        LABS:                          13.2   11.88 )-----------( 327      ( 27 Mar 2024 06:12 )             41.0       WBC  11.88 03-27 @ 06:12  10.72 03-26 @ 07:20  10.44 03-25 @ 07:55  11.25 03-22 @ 05:48      03-27    143  |  104  |  17  ----------------------------<  139<H>  3.4<L>   |  29  |  1.00    Ca    8.5      27 Mar 2024 06:12  Mg     1.9     03-27        Creatinine: 1.00 mg/dL (03-27-24 @ 06:12)  Creatinine: 1.20 mg/dL (03-26-24 @ 07:20)  Creatinine: 1.10 mg/dL (03-25-24 @ 07:55)  Creatinine: 1.10 mg/dL (03-24-24 @ 07:45)  Creatinine: 0.99 mg/dL (03-22-24 @ 05:48)  Creatinine: 1.10 mg/dL (03-21-24 @ 08:27)        Urinalysis Basic - ( 27 Mar 2024 06:12 )    Color: x / Appearance: x / SG: x / pH: x  Gluc: 139 mg/dL / Ketone: x  / Bili: x / Urobili: x   Blood: x / Protein: x / Nitrite: x   Leuk Esterase: x / RBC: x / WBC x   Sq Epi: x / Non Sq Epi: x / Bacteria: x            INFLAMMATORY MARKERS      MICROBIOLOGY:    Culture - Tissue with Gram Stain (03.26.24 @ 20:10)    Gram Stain:   No polymorphonuclear cells seen per low power field  No organisms seen per oil power field   Specimen Source: .Tissue Other, RIGHT HEEL BONE    Culture - Tissue with Gram Stain (03.26.24 @ 20:10)    Gram Stain:   Rare polymorphonuclear leukocytes seen per low power field  Few Gram positive cocci in pairs seen per oil power field   Specimen Source: .Tissue Other, RIGHT HEEL TISSUE        RADIOLOGY & ADDITIONAL STUDIES:

## 2024-03-27 NOTE — CASE MANAGEMENT PROGRESS NOTE - NSCMPROGRESSNOTE_GEN_ALL_CORE
Discussed patient in interdisciplinary rounds.  Patient is S/P right heel debridement yesterday and has pathology pending.  Patient is long term resident of [facility].  Will monitor for transition needs and remain available.

## 2024-03-27 NOTE — PROGRESS NOTE ADULT - ASSESSMENT
53M with PMH of AF on Eliquis, indwelling Aguilera CAD s/p stents, CVA, DM, HTN, osteomyelitis s/p debridement, PE, sent from [facility] for elevated BP, SOB, and dislodged Aguilera, found to be febrile in ER, a/w sepsis and A fib RVR.     Sepsis, Afib RVR, CAD, HTN  - s/p Dig load and Cardizem gtt.  Dig level = 1.5.  Keep off Dig, not a home med.   - Had a 3.4 sec pause, 3/22.  BB D/C'd.  Had 3 sec pause on 3/26.   - Continue Cardizem  mg daily  - Continue Eliquis     - BNP 7622.CxR, 3/23, showed mild Lt and trace Rt pleural effusions  - Now appears euvolemic, tolerating RA  - Continue PO Lasix 40mg BID  - 3/18/24 Technically difficult ECHO w/ normal LV size and function EF 50-55%, mildly reduced RV systolic function, mod dilated LA and RA, mod to sev MR, mild to mod TR, PASP 36  - Creatinine remains normal.  Hypernatremia resolved    - EKG: A fib RVR @ 154  - Troponin: <-51, no need to trend  - No evidence of any active ischemia   - Continue aspirin and statin     - BP stable and controlled.  No further hypotensive episode  - Continue to hold home Imdur and Losartan to allow room for AV nodals     - Monitor and replete lytes, keep K>4, Mg>2.  - Will continue to follow.    Danny Arce, MS FNP, AGACNP  Nurse Practitioner- Cardiology   Please call on TEAMS

## 2024-03-27 NOTE — PROGRESS NOTE ADULT - SUBJECTIVE AND OBJECTIVE BOX
Date of Service: 03-27-24 @ 14:14    Patient is a 53y old  Male who presents with a chief complaint of fever (26 Mar 2024 11:27)      INTERVAL HPI/OVERNIGHT EVENTS: Patient seen and examined. NAD. No complaints.     Vital Signs Last 24 Hrs  T(C): 36.7 (27 Mar 2024 12:28), Max: 37.2 (26 Mar 2024 14:43)  T(F): 98.1 (27 Mar 2024 12:28), Max: 99 (26 Mar 2024 14:43)  HR: 89 (27 Mar 2024 12:28) (66 - 145)  BP: 125/71 (27 Mar 2024 12:28) (109/64 - 146/67)  BP(mean): --  RR: 18 (27 Mar 2024 12:28) (12 - 22)  SpO2: 93% (27 Mar 2024 12:28) (91% - 98%)    Parameters below as of 27 Mar 2024 12:28  Patient On (Oxygen Delivery Method): room air        03-27    143  |  104  |  17  ----------------------------<  139<H>  3.4<L>   |  29  |  1.00    Ca    8.5      27 Mar 2024 06:12  Mg     1.9     03-27                            13.2   11.88 )-----------( 327      ( 27 Mar 2024 06:12 )             41.0       CAPILLARY BLOOD GLUCOSE      POCT Blood Glucose.: 208 mg/dL (27 Mar 2024 12:15)  POCT Blood Glucose.: 157 mg/dL (27 Mar 2024 08:10)  POCT Blood Glucose.: 122 mg/dL (26 Mar 2024 23:27)  POCT Blood Glucose.: 126 mg/dL (26 Mar 2024 21:44)  POCT Blood Glucose.: 133 mg/dL (26 Mar 2024 20:38)  POCT Blood Glucose.: 134 mg/dL (26 Mar 2024 19:29)  POCT Blood Glucose.: 135 mg/dL (26 Mar 2024 17:02)    Urinalysis Basic - ( 27 Mar 2024 06:12 )    Color: x / Appearance: x / SG: x / pH: x  Gluc: 139 mg/dL / Ketone: x  / Bili: x / Urobili: x   Blood: x / Protein: x / Nitrite: x   Leuk Esterase: x / RBC: x / WBC x   Sq Epi: x / Non Sq Epi: x / Bacteria: x              acetaminophen     Tablet .. 650 milliGRAM(s) Oral every 6 hours PRN  aluminum hydroxide/magnesium hydroxide/simethicone Suspension 30 milliLiter(s) Oral every 4 hours PRN  apixaban 5 milliGRAM(s) Oral every 12 hours  ascorbic acid 500 milliGRAM(s) Oral daily  aspirin  chewable 81 milliGRAM(s) Oral daily  atorvastatin 40 milliGRAM(s) Oral at bedtime  bisacodyl 10 milliGRAM(s) Oral daily  cholecalciferol 1000 Unit(s) Oral daily  dextrose 5%. 1000 milliLiter(s) IV Continuous <Continuous>  dextrose 5%. 1000 milliLiter(s) IV Continuous <Continuous>  dextrose 50% Injectable 12.5 Gram(s) IV Push once  dextrose 50% Injectable 25 Gram(s) IV Push once  dextrose Oral Gel 15 Gram(s) Oral once PRN  diltiazem    milliGRAM(s) Oral daily  ferrous    sulfate 325 milliGRAM(s) Oral two times a day  furosemide    Tablet 40 milliGRAM(s) Oral two times a day  gabapentin 300 milliGRAM(s) Oral every 12 hours  glucagon  Injectable 1 milliGRAM(s) IntraMuscular once  insulin glargine Injectable (LANTUS) 7 Unit(s) SubCutaneous at bedtime  insulin lispro (ADMELOG) corrective regimen sliding scale   SubCutaneous at bedtime  insulin lispro (ADMELOG) corrective regimen sliding scale   SubCutaneous three times a day before meals  insulin lispro Injectable (ADMELOG) 5 Unit(s) SubCutaneous three times a day before meals  loratadine 10 milliGRAM(s) Oral daily  melatonin 5 milliGRAM(s) Oral at bedtime  metoclopramide 5 milliGRAM(s) Oral three times a day  multivitamin/minerals 1 Tablet(s) Oral daily  naloxegol 25 milliGRAM(s) Oral daily  ondansetron Injectable 4 milliGRAM(s) IV Push every 6 hours PRN  oxybutynin 5 milliGRAM(s) Oral two times a day  oxyCODONE    IR 10 milliGRAM(s) Oral two times a day  pantoprazole    Tablet 40 milliGRAM(s) Oral before breakfast  polyethylene glycol 3350 17 Gram(s) Oral two times a day  saccharomyces boulardii 250 milliGRAM(s) Oral two times a day  senna 2 Tablet(s) Oral at bedtime  sodium chloride 0.65% Nasal 1 Spray(s) Both Nostrils daily  sucralfate 1 Gram(s) Oral two times a day  tiotropium 2.5 MICROgram(s) Inhaler 2 Puff(s) Inhalation daily              REVIEW OF SYSTEMS:  CONSTITUTIONAL: No fever, no weight loss, or no fatigue  NECK: No pain, no stiffness  RESPIRATORY: No cough, no wheezing, no chills, no hemoptysis, No shortness of breath  CARDIOVASCULAR: No chest pain, no palpitations, no dizziness, no leg swelling  GASTROINTESTINAL: No abdominal pain. No nausea, no vomiting, no hematemesis; No diarrhea, no constipation. No melena, no hematochezia.  GENITOURINARY: No dysuria, no frequency, no hematuria, no incontinence  NEUROLOGICAL: No headaches, no loss of strength, no numbness, no tremors  SKIN: + diffuse rash  MUSCULOSKELETAL: No joint pain, no swelling; No muscle, no back, no extremity pain  PSYCHIATRIC: No depression, no mood swings,   HEME/LYMPH: No easy bruising, no bleeding gums  ALLERY AND IMMUNOLOGIC: No hives       Consultant(s) Notes Reviewed:  [X] YES  [ ] NO    PHYSICAL EXAM:  GENERAL: NAD  HEAD:  Atraumatic, Normocephalic  EYES: EOMI, PERRLA, conjunctiva and sclera clear  ENMT: No tonsillar erythema, exudates, or enlargement; Moist mucous membranes  NECK: Supple, No JVD  NERVOUS SYSTEM:  Awake & alert  CHEST/LUNG: Clear to auscultation bilaterally; No rales, rhonchi, wheezing,  HEART: Regular rate and rhythm  ABDOMEN: Soft, Nontender, Nondistended; Bowel sounds present  EXTREMITIES:  No clubbing, cyanosis, or edema  LYMPH: No lymphadenopathy noted  SKIN: + rashes      Advanced care planning discussed with patient/family [X] YES   [ ] NO    Advanced care planning discussed with patient/family. Patient's health status was discussed. All appropriate changes have been made regarding patient's end-of-life care. Advanced care planning forms reviewed/discussed/completed.  20 minutes spent.

## 2024-03-28 LAB
-  AMPICILLIN/SULBACTAM: SIGNIFICANT CHANGE UP
-  AMPICILLIN: SIGNIFICANT CHANGE UP
-  CEFAZOLIN: SIGNIFICANT CHANGE UP
-  CLINDAMYCIN: SIGNIFICANT CHANGE UP
-  DAPTOMYCIN: SIGNIFICANT CHANGE UP
-  ERYTHROMYCIN: SIGNIFICANT CHANGE UP
-  GENTAMICIN: SIGNIFICANT CHANGE UP
-  LINEZOLID: SIGNIFICANT CHANGE UP
-  OXACILLIN: SIGNIFICANT CHANGE UP
-  PENICILLIN: SIGNIFICANT CHANGE UP
-  RIFAMPIN: SIGNIFICANT CHANGE UP
-  TETRACYCLINE: SIGNIFICANT CHANGE UP
-  TRIMETHOPRIM/SULFAMETHOXAZOLE: SIGNIFICANT CHANGE UP
-  VANCOMYCIN: SIGNIFICANT CHANGE UP
-  VANCOMYCIN: SIGNIFICANT CHANGE UP
ANION GAP SERPL CALC-SCNC: 8 MMOL/L — SIGNIFICANT CHANGE UP (ref 5–17)
BUN SERPL-MCNC: 18 MG/DL — SIGNIFICANT CHANGE UP (ref 7–23)
CALCIUM SERPL-MCNC: 8.3 MG/DL — LOW (ref 8.5–10.1)
CHLORIDE SERPL-SCNC: 101 MMOL/L — SIGNIFICANT CHANGE UP (ref 96–108)
CO2 SERPL-SCNC: 32 MMOL/L — HIGH (ref 22–31)
CREAT SERPL-MCNC: 1.1 MG/DL — SIGNIFICANT CHANGE UP (ref 0.5–1.3)
EGFR: 79 ML/MIN/1.73M2 — SIGNIFICANT CHANGE UP
GLUCOSE BLDC GLUCOMTR-MCNC: 269 MG/DL — HIGH (ref 70–99)
GLUCOSE BLDC GLUCOMTR-MCNC: 283 MG/DL — HIGH (ref 70–99)
GLUCOSE BLDC GLUCOMTR-MCNC: 306 MG/DL — HIGH (ref 70–99)
GLUCOSE BLDC GLUCOMTR-MCNC: 307 MG/DL — HIGH (ref 70–99)
GLUCOSE SERPL-MCNC: 307 MG/DL — HIGH (ref 70–99)
HCT VFR BLD CALC: 37.4 % — LOW (ref 39–50)
HGB BLD-MCNC: 12.4 G/DL — LOW (ref 13–17)
MAGNESIUM SERPL-MCNC: 1.9 MG/DL — SIGNIFICANT CHANGE UP (ref 1.6–2.6)
MCHC RBC-ENTMCNC: 30.7 PG — SIGNIFICANT CHANGE UP (ref 27–34)
MCHC RBC-ENTMCNC: 33.2 GM/DL — SIGNIFICANT CHANGE UP (ref 32–36)
MCV RBC AUTO: 92.6 FL — SIGNIFICANT CHANGE UP (ref 80–100)
METHOD TYPE: SIGNIFICANT CHANGE UP
METHOD TYPE: SIGNIFICANT CHANGE UP
NRBC # BLD: 0 /100 WBCS — SIGNIFICANT CHANGE UP (ref 0–0)
PLATELET # BLD AUTO: 298 K/UL — SIGNIFICANT CHANGE UP (ref 150–400)
POTASSIUM SERPL-MCNC: 4.7 MMOL/L — SIGNIFICANT CHANGE UP (ref 3.5–5.3)
POTASSIUM SERPL-SCNC: 4.7 MMOL/L — SIGNIFICANT CHANGE UP (ref 3.5–5.3)
RBC # BLD: 4.04 M/UL — LOW (ref 4.2–5.8)
RBC # FLD: 12.6 % — SIGNIFICANT CHANGE UP (ref 10.3–14.5)
SODIUM SERPL-SCNC: 141 MMOL/L — SIGNIFICANT CHANGE UP (ref 135–145)
SURGICAL PATHOLOGY STUDY: SIGNIFICANT CHANGE UP
WBC # BLD: 11.26 K/UL — HIGH (ref 3.8–10.5)
WBC # FLD AUTO: 11.26 K/UL — HIGH (ref 3.8–10.5)

## 2024-03-28 PROCEDURE — 99232 SBSQ HOSP IP/OBS MODERATE 35: CPT

## 2024-03-28 RX ORDER — VANCOMYCIN HCL 1 G
1000 VIAL (EA) INTRAVENOUS ONCE
Refills: 0 | Status: COMPLETED | OUTPATIENT
Start: 2024-03-28 | End: 2024-03-28

## 2024-03-28 RX ORDER — AMOXICILLIN 250 MG/5ML
1000 SUSPENSION, RECONSTITUTED, ORAL (ML) ORAL THREE TIMES A DAY
Refills: 0 | Status: DISCONTINUED | OUTPATIENT
Start: 2024-03-28 | End: 2024-03-29

## 2024-03-28 RX ORDER — INSULIN GLARGINE 100 [IU]/ML
10 INJECTION, SOLUTION SUBCUTANEOUS AT BEDTIME
Refills: 0 | Status: DISCONTINUED | OUTPATIENT
Start: 2024-03-28 | End: 2024-03-29

## 2024-03-28 RX ORDER — CEFAZOLIN SODIUM 1 G
2000 VIAL (EA) INJECTION EVERY 8 HOURS
Refills: 0 | Status: DISCONTINUED | OUTPATIENT
Start: 2024-03-28 | End: 2024-03-29

## 2024-03-28 RX ADMIN — INSULIN GLARGINE 10 UNIT(S): 100 INJECTION, SOLUTION SUBCUTANEOUS at 21:59

## 2024-03-28 RX ADMIN — Medication 1000 MILLIGRAM(S): at 20:35

## 2024-03-28 RX ADMIN — Medication 5 UNIT(S): at 08:28

## 2024-03-28 RX ADMIN — LORATADINE 10 MILLIGRAM(S): 10 TABLET ORAL at 12:37

## 2024-03-28 RX ADMIN — TIOTROPIUM BROMIDE 2 PUFF(S): 18 CAPSULE ORAL; RESPIRATORY (INHALATION) at 06:10

## 2024-03-28 RX ADMIN — NALOXEGOL OXALATE 25 MILLIGRAM(S): 12.5 TABLET, FILM COATED ORAL at 17:19

## 2024-03-28 RX ADMIN — Medication 1 GRAM(S): at 06:10

## 2024-03-28 RX ADMIN — Medication 8: at 17:53

## 2024-03-28 RX ADMIN — APIXABAN 5 MILLIGRAM(S): 2.5 TABLET, FILM COATED ORAL at 17:20

## 2024-03-28 RX ADMIN — Medication 100 MILLIGRAM(S): at 20:35

## 2024-03-28 RX ADMIN — Medication 250 MILLIGRAM(S): at 01:34

## 2024-03-28 RX ADMIN — Medication 5 MILLIGRAM(S): at 17:21

## 2024-03-28 RX ADMIN — Medication 250 MILLIGRAM(S): at 17:27

## 2024-03-28 RX ADMIN — Medication 1: at 21:59

## 2024-03-28 RX ADMIN — Medication 1 GRAM(S): at 17:20

## 2024-03-28 RX ADMIN — Medication 5 UNIT(S): at 17:54

## 2024-03-28 RX ADMIN — Medication 40 MILLIGRAM(S): at 17:19

## 2024-03-28 RX ADMIN — Medication 81 MILLIGRAM(S): at 12:37

## 2024-03-28 RX ADMIN — Medication 250 MILLIGRAM(S): at 06:10

## 2024-03-28 RX ADMIN — Medication 5 MILLIGRAM(S): at 06:10

## 2024-03-28 RX ADMIN — SENNA PLUS 2 TABLET(S): 8.6 TABLET ORAL at 20:36

## 2024-03-28 RX ADMIN — GABAPENTIN 300 MILLIGRAM(S): 400 CAPSULE ORAL at 06:09

## 2024-03-28 RX ADMIN — OXYCODONE HYDROCHLORIDE 10 MILLIGRAM(S): 5 TABLET ORAL at 06:59

## 2024-03-28 RX ADMIN — GABAPENTIN 300 MILLIGRAM(S): 400 CAPSULE ORAL at 17:20

## 2024-03-28 RX ADMIN — Medication 1 TABLET(S): at 12:38

## 2024-03-28 RX ADMIN — Medication 500 MILLIGRAM(S): at 12:37

## 2024-03-28 RX ADMIN — Medication 325 MILLIGRAM(S): at 06:10

## 2024-03-28 RX ADMIN — ATORVASTATIN CALCIUM 40 MILLIGRAM(S): 80 TABLET, FILM COATED ORAL at 20:36

## 2024-03-28 RX ADMIN — Medication 1000 MILLIGRAM(S): at 12:37

## 2024-03-28 RX ADMIN — Medication 5 MILLIGRAM(S): at 21:59

## 2024-03-28 RX ADMIN — POLYETHYLENE GLYCOL 3350 17 GRAM(S): 17 POWDER, FOR SOLUTION ORAL at 17:21

## 2024-03-28 RX ADMIN — Medication 6: at 08:27

## 2024-03-28 RX ADMIN — Medication 100 MILLIGRAM(S): at 12:37

## 2024-03-28 RX ADMIN — Medication 40 MILLIGRAM(S): at 06:09

## 2024-03-28 RX ADMIN — Medication 5 UNIT(S): at 12:36

## 2024-03-28 RX ADMIN — PANTOPRAZOLE SODIUM 40 MILLIGRAM(S): 20 TABLET, DELAYED RELEASE ORAL at 06:10

## 2024-03-28 RX ADMIN — Medication 5 MILLIGRAM(S): at 17:19

## 2024-03-28 RX ADMIN — APIXABAN 5 MILLIGRAM(S): 2.5 TABLET, FILM COATED ORAL at 06:09

## 2024-03-28 RX ADMIN — Medication 1000 UNIT(S): at 12:38

## 2024-03-28 RX ADMIN — Medication 8: at 12:36

## 2024-03-28 RX ADMIN — OXYCODONE HYDROCHLORIDE 10 MILLIGRAM(S): 5 TABLET ORAL at 17:20

## 2024-03-28 RX ADMIN — Medication 20 MILLIGRAM(S): at 06:09

## 2024-03-28 RX ADMIN — OXYCODONE HYDROCHLORIDE 10 MILLIGRAM(S): 5 TABLET ORAL at 06:09

## 2024-03-28 RX ADMIN — Medication 360 MILLIGRAM(S): at 06:09

## 2024-03-28 RX ADMIN — Medication 10 MILLIGRAM(S): at 12:38

## 2024-03-28 RX ADMIN — Medication 5 MILLIGRAM(S): at 06:09

## 2024-03-28 RX ADMIN — Medication 325 MILLIGRAM(S): at 17:20

## 2024-03-28 NOTE — PROGRESS NOTE ADULT - SUBJECTIVE AND OBJECTIVE BOX
Date of Service: 03-28-24 @ 12:44    Patient is a 53y old  Male who presents with a chief complaint of afib (28 Mar 2024 10:48)      INTERVAL HPI/OVERNIGHT EVENTS: Patient seen and examined. NAD. No complaints.    Vital Signs Last 24 Hrs  T(C): 36.4 (28 Mar 2024 04:45), Max: 36.6 (27 Mar 2024 20:53)  T(F): 97.6 (28 Mar 2024 04:45), Max: 97.9 (27 Mar 2024 20:53)  HR: 90 (28 Mar 2024 04:45) (90 - 95)  BP: 119/68 (28 Mar 2024 04:45) (102/73 - 125/80)  BP(mean): --  RR: 18 (28 Mar 2024 04:45) (18 - 18)  SpO2: 93% (28 Mar 2024 04:45) (93% - 96%)    Parameters below as of 28 Mar 2024 04:45  Patient On (Oxygen Delivery Method): room air        03-28    141  |  101  |  18  ----------------------------<  307<H>  4.7   |  32<H>  |  1.10    Ca    8.3<L>      28 Mar 2024 06:34  Mg     1.9     03-28                            12.4   11.26 )-----------( 298      ( 28 Mar 2024 06:34 )             37.4       CAPILLARY BLOOD GLUCOSE      POCT Blood Glucose.: 307 mg/dL (28 Mar 2024 12:02)  POCT Blood Glucose.: 269 mg/dL (28 Mar 2024 08:15)  POCT Blood Glucose.: 195 mg/dL (27 Mar 2024 21:27)  POCT Blood Glucose.: 216 mg/dL (27 Mar 2024 17:00)    Urinalysis Basic - ( 28 Mar 2024 06:34 )    Color: x / Appearance: x / SG: x / pH: x  Gluc: 307 mg/dL / Ketone: x  / Bili: x / Urobili: x   Blood: x / Protein: x / Nitrite: x   Leuk Esterase: x / RBC: x / WBC x   Sq Epi: x / Non Sq Epi: x / Bacteria: x      Culture - Tissue with Gram Stain (03.26.24 @ 20:10)   Gram Stain:   No polymorphonuclear cells seen per low power field   No organisms seen per oil power field  Specimen Source: .Tissue Other, RIGHT HEEL BONE  Culture Results:   Rare Staphylococcus aureus   See previous culture [id number]      Historical Values  Culture - Tissue with Gram Stain (03.26.24 @ 20:10)   Gram Stain:   No polymorphonuclear cells seen per low power field   No organisms seen per oil power field  Specimen Source: .Tissue Other, RIGHT HEEL BONE  Culture Results:   Rare Staphylococcus aureus   See previous culture [id number] - Tissue with Gram Stain (03.26.24 @ 20:10)   Gram Stain:   Rare polymorphonuclear leukocytes seen per low power field   Few Gram positive cocci in pairs seen per oil power field  Specimen Source: .Tissue Other, RIGHT HEEL TISSUE  Culture Results:   Few Staphylococcus aureus   Moderate Enterococcus faecalis        acetaminophen     Tablet .. 650 milliGRAM(s) Oral every 6 hours PRN  aluminum hydroxide/magnesium hydroxide/simethicone Suspension 30 milliLiter(s) Oral every 4 hours PRN  amoxicillin 1000 milliGRAM(s) Oral three times a day  apixaban 5 milliGRAM(s) Oral every 12 hours  ascorbic acid 500 milliGRAM(s) Oral daily  aspirin  chewable 81 milliGRAM(s) Oral daily  atorvastatin 40 milliGRAM(s) Oral at bedtime  bisacodyl 10 milliGRAM(s) Oral daily  ceFAZolin   IVPB 2000 milliGRAM(s) IV Intermittent every 8 hours  cholecalciferol 1000 Unit(s) Oral daily  dextrose 5%. 1000 milliLiter(s) IV Continuous <Continuous>  dextrose 5%. 1000 milliLiter(s) IV Continuous <Continuous>  dextrose 50% Injectable 25 Gram(s) IV Push once  dextrose 50% Injectable 12.5 Gram(s) IV Push once  dextrose Oral Gel 15 Gram(s) Oral once PRN  diltiazem    milliGRAM(s) Oral daily  ferrous    sulfate 325 milliGRAM(s) Oral two times a day  furosemide    Tablet 40 milliGRAM(s) Oral two times a day  gabapentin 300 milliGRAM(s) Oral every 12 hours  glucagon  Injectable 1 milliGRAM(s) IntraMuscular once  insulin glargine Injectable (LANTUS) 10 Unit(s) SubCutaneous at bedtime  insulin lispro (ADMELOG) corrective regimen sliding scale   SubCutaneous at bedtime  insulin lispro (ADMELOG) corrective regimen sliding scale   SubCutaneous three times a day before meals  insulin lispro Injectable (ADMELOG) 5 Unit(s) SubCutaneous three times a day before meals  loratadine 10 milliGRAM(s) Oral daily  melatonin 5 milliGRAM(s) Oral at bedtime  metoclopramide 5 milliGRAM(s) Oral three times a day  multivitamin/minerals 1 Tablet(s) Oral daily  naloxegol 25 milliGRAM(s) Oral daily  ondansetron Injectable 4 milliGRAM(s) IV Push every 6 hours PRN  oxybutynin 5 milliGRAM(s) Oral two times a day  oxyCODONE    IR 10 milliGRAM(s) Oral two times a day  pantoprazole    Tablet 40 milliGRAM(s) Oral before breakfast  polyethylene glycol 3350 17 Gram(s) Oral two times a day  predniSONE   Tablet 20 milliGRAM(s) Oral daily  saccharomyces boulardii 250 milliGRAM(s) Oral two times a day  senna 2 Tablet(s) Oral at bedtime  sodium chloride 0.65% Nasal 1 Spray(s) Both Nostrils daily  sucralfate 1 Gram(s) Oral two times a day  tiotropium 2.5 MICROgram(s) Inhaler 2 Puff(s) Inhalation daily              REVIEW OF SYSTEMS:  CONSTITUTIONAL: No fever, no weight loss, or no fatigue  NECK: No pain, no stiffness  RESPIRATORY: No cough, no wheezing, no chills, no hemoptysis, No shortness of breath  CARDIOVASCULAR: No chest pain, no palpitations, no dizziness, no leg swelling  GASTROINTESTINAL: No abdominal pain. No nausea, no vomiting, no hematemesis; No diarrhea, no constipation. No melena, no hematochezia.  GENITOURINARY: No dysuria, no frequency, no hematuria, no incontinence  NEUROLOGICAL: No headaches, no loss of strength, no numbness, no tremors  SKIN: No itching, no burning  MUSCULOSKELETAL: No joint pain, no swelling; No muscle, no back, no extremity pain  PSYCHIATRIC: No depression, no mood swings,   HEME/LYMPH: No easy bruising, no bleeding gums  ALLERY AND IMMUNOLOGIC: No hives       Consultant(s) Notes Reviewed:  [X] YES  [ ] NO    PHYSICAL EXAM:  GENERAL: NAD  HEAD:  Atraumatic, Normocephalic  EYES: EOMI, PERRLA, conjunctiva and sclera clear  ENMT: No tonsillar erythema, exudates, or enlargement; Moist mucous membranes  NECK: Supple, No JVD  NERVOUS SYSTEM:  Awake & alert  CHEST/LUNG: Clear to auscultation bilaterally; No rales, rhonchi, wheezing,  HEART: Regular rate and rhythm  ABDOMEN: Soft, Nontender, Nondistended; Bowel sounds present  EXTREMITIES:  No clubbing, cyanosis, or edema  LYMPH: No lymphadenopathy noted  SKIN: + rashes      Advanced care planning discussed with patient/family [X] YES   [ ] NO    Advanced care planning discussed with patient/family. Patient's health status was discussed. All appropriate changes have been made regarding patient's end-of-life care. Advanced care planning forms reviewed/discussed/completed.  20 minutes spent.

## 2024-03-28 NOTE — PROGRESS NOTE ADULT - SUBJECTIVE AND OBJECTIVE BOX
Date/Time Patient Seen:  		  Referring MD:   Data Reviewed	       Patient is a 53y old  Male who presents with a chief complaint of fever (27 Mar 2024 11:13)      Subjective/HPI     PAST MEDICAL & SURGICAL HISTORY:  No pertinent past medical history    Diabetes    No pertinent past medical history    Diabetes mellitus with no complication    Afib    Hypertension    BPH (benign prostatic hyperplasia)    Perforated gastric ulcer  s/p emergent ex-lap omentopexy and plication 6/2019    Pulmonary embolism    History of non-ST elevation myocardial infarction (NSTEMI)    Osteomyelitis  s/p debridement    CAD S/P percutaneous coronary angioplasty    Cerebrovascular accident    No significant past surgical history    No significant past surgical history    H/O abdominal surgery    Perforated gastric ulcer    Traumatic amputation of left foot, initial encounter          Medication list         MEDICATIONS  (STANDING):  apixaban 5 milliGRAM(s) Oral every 12 hours  ascorbic acid 500 milliGRAM(s) Oral daily  aspirin  chewable 81 milliGRAM(s) Oral daily  atorvastatin 40 milliGRAM(s) Oral at bedtime  bisacodyl 10 milliGRAM(s) Oral daily  cholecalciferol 1000 Unit(s) Oral daily  dextrose 5%. 1000 milliLiter(s) (50 mL/Hr) IV Continuous <Continuous>  dextrose 5%. 1000 milliLiter(s) (100 mL/Hr) IV Continuous <Continuous>  dextrose 50% Injectable 12.5 Gram(s) IV Push once  dextrose 50% Injectable 25 Gram(s) IV Push once  diltiazem    milliGRAM(s) Oral daily  ferrous    sulfate 325 milliGRAM(s) Oral two times a day  furosemide    Tablet 40 milliGRAM(s) Oral two times a day  gabapentin 300 milliGRAM(s) Oral every 12 hours  glucagon  Injectable 1 milliGRAM(s) IntraMuscular once  insulin glargine Injectable (LANTUS) 7 Unit(s) SubCutaneous at bedtime  insulin lispro (ADMELOG) corrective regimen sliding scale   SubCutaneous at bedtime  insulin lispro (ADMELOG) corrective regimen sliding scale   SubCutaneous three times a day before meals  insulin lispro Injectable (ADMELOG) 5 Unit(s) SubCutaneous three times a day before meals  loratadine 10 milliGRAM(s) Oral daily  melatonin 5 milliGRAM(s) Oral at bedtime  metoclopramide 5 milliGRAM(s) Oral three times a day  multivitamin/minerals 1 Tablet(s) Oral daily  naloxegol 25 milliGRAM(s) Oral daily  oxybutynin 5 milliGRAM(s) Oral two times a day  oxyCODONE    IR 10 milliGRAM(s) Oral two times a day  pantoprazole    Tablet 40 milliGRAM(s) Oral before breakfast  polyethylene glycol 3350 17 Gram(s) Oral two times a day  predniSONE   Tablet 20 milliGRAM(s) Oral daily  saccharomyces boulardii 250 milliGRAM(s) Oral two times a day  senna 2 Tablet(s) Oral at bedtime  sodium chloride 0.65% Nasal 1 Spray(s) Both Nostrils daily  sucralfate 1 Gram(s) Oral two times a day  tiotropium 2.5 MICROgram(s) Inhaler 2 Puff(s) Inhalation daily    MEDICATIONS  (PRN):  acetaminophen     Tablet .. 650 milliGRAM(s) Oral every 6 hours PRN Temp greater or equal to 38C (100.4F), Mild Pain (1 - 3)  aluminum hydroxide/magnesium hydroxide/simethicone Suspension 30 milliLiter(s) Oral every 4 hours PRN Dyspepsia  dextrose Oral Gel 15 Gram(s) Oral once PRN Blood Glucose LESS THAN 70 milliGRAM(s)/deciliter  ondansetron Injectable 4 milliGRAM(s) IV Push every 6 hours PRN Nausea and/or Vomiting         Vitals log        ICU Vital Signs Last 24 Hrs  T(C): 36.4 (28 Mar 2024 04:45), Max: 36.7 (27 Mar 2024 12:28)  T(F): 97.6 (28 Mar 2024 04:45), Max: 98.1 (27 Mar 2024 12:28)  HR: 90 (28 Mar 2024 04:45) (89 - 95)  BP: 119/68 (28 Mar 2024 04:45) (102/73 - 125/80)  BP(mean): --  ABP: --  ABP(mean): --  RR: 18 (28 Mar 2024 04:45) (18 - 18)  SpO2: 93% (28 Mar 2024 04:45) (93% - 96%)    O2 Parameters below as of 28 Mar 2024 04:45  Patient On (Oxygen Delivery Method): room air                 Input and Output:  I&O's Detail    26 Mar 2024 07:01  -  27 Mar 2024 07:00  --------------------------------------------------------  IN:    Oral Fluid: 240 mL  Total IN: 240 mL    OUT:    Indwelling Catheter - Urethral (mL): 50 mL    Voided (mL): 600 mL  Total OUT: 650 mL    Total NET: -410 mL      27 Mar 2024 07:01  -  28 Mar 2024 06:25  --------------------------------------------------------  IN:  Total IN: 0 mL    OUT:    Indwelling Catheter - Urethral (mL): 1200 mL  Total OUT: 1200 mL    Total NET: -1200 mL          Lab Data                        13.2   11.88 )-----------( 327      ( 27 Mar 2024 06:12 )             41.0     03-27    143  |  104  |  17  ----------------------------<  139<H>  3.4<L>   |  29  |  1.00    Ca    8.5      27 Mar 2024 06:12  Mg     1.9     03-27              Review of Systems	      Objective     Physical Examination    heart s1s2  lung dc BS  head nc    Pertinent Lab findings & Imaging      Ale:  NO   Adequate UO     I&O's Detail    26 Mar 2024 07:01  -  27 Mar 2024 07:00  --------------------------------------------------------  IN:    Oral Fluid: 240 mL  Total IN: 240 mL    OUT:    Indwelling Catheter - Urethral (mL): 50 mL    Voided (mL): 600 mL  Total OUT: 650 mL    Total NET: -410 mL      27 Mar 2024 07:01  -  28 Mar 2024 06:25  --------------------------------------------------------  IN:  Total IN: 0 mL    OUT:    Indwelling Catheter - Urethral (mL): 1200 mL  Total OUT: 1200 mL    Total NET: -1200 mL               Discussed with:     Cultures:	        Radiology

## 2024-03-28 NOTE — PROGRESS NOTE ADULT - SUBJECTIVE AND OBJECTIVE BOX
[facility]FECTIOUS [facility]  Juventino Whitten MD PhD, Symone Hickey MD, Anne-Marie Li MD, Jeffery Jacobs MD, Ryan Romeo MD  and providing coverage with Melody Armstrong MD  Providing Infectious Disease Consultations at [facility], [facility], [facility]'s    Office# [phone] to schedule follow up appointments  Answering Service for urgent calls or New Consults [phone]  Cell# to text for urgent issues Juventino Whitten [phone]     infectious diseases progress note:    SARAH MORRISON is a 53y y. o. Male patient    Overnight and events of the last 24hrs reviewed    Allergies    No Known Drug Allergies  fish (Hives)    Intolerances        ANTIBIOTICS/RELEVANT:  antimicrobials    immunologic:    OTHER:  acetaminophen     Tablet .. 650 milliGRAM(s) Oral every 6 hours PRN  aluminum hydroxide/magnesium hydroxide/simethicone Suspension 30 milliLiter(s) Oral every 4 hours PRN  apixaban 5 milliGRAM(s) Oral every 12 hours  ascorbic acid 500 milliGRAM(s) Oral daily  aspirin  chewable 81 milliGRAM(s) Oral daily  atorvastatin 40 milliGRAM(s) Oral at bedtime  bisacodyl 10 milliGRAM(s) Oral daily  cholecalciferol 1000 Unit(s) Oral daily  dextrose 5%. 1000 milliLiter(s) IV Continuous <Continuous>  dextrose 5%. 1000 milliLiter(s) IV Continuous <Continuous>  dextrose 50% Injectable 12.5 Gram(s) IV Push once  dextrose 50% Injectable 25 Gram(s) IV Push once  dextrose Oral Gel 15 Gram(s) Oral once PRN  diltiazem    milliGRAM(s) Oral daily  ferrous    sulfate 325 milliGRAM(s) Oral two times a day  furosemide    Tablet 40 milliGRAM(s) Oral two times a day  gabapentin 300 milliGRAM(s) Oral every 12 hours  glucagon  Injectable 1 milliGRAM(s) IntraMuscular once  insulin glargine Injectable (LANTUS) 10 Unit(s) SubCutaneous at bedtime  insulin lispro (ADMELOG) corrective regimen sliding scale   SubCutaneous at bedtime  insulin lispro (ADMELOG) corrective regimen sliding scale   SubCutaneous three times a day before meals  insulin lispro Injectable (ADMELOG) 5 Unit(s) SubCutaneous three times a day before meals  loratadine 10 milliGRAM(s) Oral daily  melatonin 5 milliGRAM(s) Oral at bedtime  metoclopramide 5 milliGRAM(s) Oral three times a day  multivitamin/minerals 1 Tablet(s) Oral daily  naloxegol 25 milliGRAM(s) Oral daily  ondansetron Injectable 4 milliGRAM(s) IV Push every 6 hours PRN  oxybutynin 5 milliGRAM(s) Oral two times a day  oxyCODONE    IR 10 milliGRAM(s) Oral two times a day  pantoprazole    Tablet 40 milliGRAM(s) Oral before breakfast  polyethylene glycol 3350 17 Gram(s) Oral two times a day  predniSONE   Tablet 20 milliGRAM(s) Oral daily  saccharomyces boulardii 250 milliGRAM(s) Oral two times a day  senna 2 Tablet(s) Oral at bedtime  sodium chloride 0.65% Nasal 1 Spray(s) Both Nostrils daily  sucralfate 1 Gram(s) Oral two times a day  tiotropium 2.5 MICROgram(s) Inhaler 2 Puff(s) Inhalation daily      Objective:  Vital Signs Last 24 Hrs  T(C): 36.4 (28 Mar 2024 04:45), Max: 36.7 (27 Mar 2024 12:28)  T(F): 97.6 (28 Mar 2024 04:45), Max: 98.1 (27 Mar 2024 12:28)  HR: 90 (28 Mar 2024 04:45) (89 - 95)  BP: 119/68 (28 Mar 2024 04:45) (102/73 - 125/80)  BP(mean): --  RR: 18 (28 Mar 2024 04:45) (18 - 18)  SpO2: 93% (28 Mar 2024 04:45) (93% - 96%)    Parameters below as of 28 Mar 2024 04:45  Patient On (Oxygen Delivery Method): room air        T(C): 36.4 (03-28-24 @ 04:45), Max: 37.2 (03-26-24 @ 14:43)  T(C): 36.4 (03-28-24 @ 04:45), Max: 37.2 (03-26-24 @ 14:43)  T(C): 36.4 (03-28-24 @ 04:45), Max: 37.2 (03-26-24 @ 14:43)    PHYSICAL EXAM:  HEENT: NC atraumatic  Neck: supple  Respiratory: no accessory muscle use, breathing comfortably  Cardiovascular: distant  Gastrointestinal: normal appearing, nondistended  Extremities: no clubbing, no cyanosis,        LABS:                          12.4   11.26 )-----------( 298      ( 28 Mar 2024 06:34 )             37.4       WBC  11.26 03-28 @ 06:34  11.88 03-27 @ 06:12  10.72 03-26 @ 07:20  10.44 03-25 @ 07:55  11.25 03-22 @ 05:48      03-28    141  |  101  |  18  ----------------------------<  307<H>  4.7   |  32<H>  |  1.10    Ca    8.3<L>      28 Mar 2024 06:34  Mg     1.9     03-28        Creatinine: 1.10 mg/dL (03-28-24 @ 06:34)  Creatinine: 1.00 mg/dL (03-27-24 @ 06:12)  Creatinine: 1.20 mg/dL (03-26-24 @ 07:20)  Creatinine: 1.10 mg/dL (03-25-24 @ 07:55)  Creatinine: 1.10 mg/dL (03-24-24 @ 07:45)  Creatinine: 0.99 mg/dL (03-22-24 @ 05:48)        Urinalysis Basic - ( 28 Mar 2024 06:34 )    Color: x / Appearance: x / SG: x / pH: x  Gluc: 307 mg/dL / Ketone: x  / Bili: x / Urobili: x   Blood: x / Protein: x / Nitrite: x   Leuk Esterase: x / RBC: x / WBC x   Sq Epi: x / Non Sq Epi: x / Bacteria: x            INFLAMMATORY MARKERS      MICROBIOLOGY:    Culture - Tissue with Gram Stain (03.26.24 @ 20:10)    Gram Stain:   No polymorphonuclear cells seen per low power field  No organisms seen per oil power field   Specimen Source: .Tissue Other, RIGHT HEEL BONE   Culture Results:   Rare Staphylococcus aureus  See previous culture  [id number]    Culture - Tissue with Gram Stain (03.26.24 @ 20:10)    Gram Stain:   Rare polymorphonuclear leukocytes seen per low power field  Few Gram positive cocci in pairs seen per oil power field   Specimen Source: .Tissue Other, RIGHT HEEL TISSUE   Culture Results:   Few Staphylococcus aureus  Moderate Enterococcus faecalis    MRSA/MSSA PCR (03.21.24 @ 10:00)    MRSA PCR Result.: NotDetec:    Staph aureus PCR Result: Detected    RADIOLOGY & ADDITIONAL STUDIES:

## 2024-03-28 NOTE — PROGRESS NOTE ADULT - ASSESSMENT
53M with PMH of AF on Eliquis, indwelling Aguilera CAD s/p stents, CVA, DM, HTN, osteomyelitis s/p debridement, PE, sent from [facility] for elevated BP, SOB, and dislodged Aguilera, found to be febrile in ER, a/w sepsis and A fib RVR.     Sepsis, Afib RVR, CAD, HTN  - s/p Dig load and Cardizem gtt.  Dig level = 1.5.  Keep off Dig, not a home med.   - Had a 3.4 sec pause, 3/22.  BB D/C'd.  Had 3 sec pause on 3/26.   - Continue Cardizem  mg daily  - Continue Eliquis     - BNP 7622.CxR, 3/23, showed mild Lt and trace Rt pleural effusions  - Now appears euvolemic, tolerating RA  - Continue PO Lasix 40mg BID  - 3/18/24 Technically difficult ECHO w/ normal LV size and function EF 50-55%, mildly reduced RV systolic function, mod dilated LA and RA, mod to sev MR, mild to mod TR, PASP 36  - Hypernatremia resolved    - EKG: A fib RVR @ 154  - Troponin: negative   - No evidence of any active ischemia   - Continue aspirin and statin     - BP stable and controlled.  No further hypotensive episode  - Continue to hold home Imdur and Losartan to allow room for AV nodals     - Monitor and replete lytes, keep K>4, Mg>2.  - Will continue to follow.

## 2024-03-28 NOTE — PROGRESS NOTE ADULT - SUBJECTIVE AND OBJECTIVE BOX
[facility] -- Brittany Lutz Pannella, Patel, Savella Goodger, Cohen  Office # [phone]      Follow Up:    Af   Subjective/Observations:   No events overnight resting comfortably in bed.  No complaints of chest pain, dyspnea, or palpitations reported. No signs of orthopnea or PND.  remains on room air       REVIEW OF SYSTEMS: All other review of systems is negative unless indicated above    PAST MEDICAL & SURGICAL HISTORY:  Diabetes      Diabetes mellitus with no complication      Afib      Hypertension      BPH (benign prostatic hyperplasia)      Perforated gastric ulcer  s/p emergent ex-lap omentopexy and plication 2019      Pulmonary embolism      History of non-ST elevation myocardial infarction (NSTEMI)      Osteomyelitis  s/p debridement      CAD S/P percutaneous coronary angioplasty      Cerebrovascular accident      H/O abdominal surgery      Perforated gastric ulcer      Traumatic amputation of left foot, initial encounter          MEDICATIONS  (STANDING):  apixaban 5 milliGRAM(s) Oral every 12 hours  ascorbic acid 500 milliGRAM(s) Oral daily  aspirin  chewable 81 milliGRAM(s) Oral daily  atorvastatin 40 milliGRAM(s) Oral at bedtime  bisacodyl 10 milliGRAM(s) Oral daily  cholecalciferol 1000 Unit(s) Oral daily  dextrose 5%. 1000 milliLiter(s) (100 mL/Hr) IV Continuous <Continuous>  dextrose 5%. 1000 milliLiter(s) (50 mL/Hr) IV Continuous <Continuous>  dextrose 50% Injectable 12.5 Gram(s) IV Push once  dextrose 50% Injectable 25 Gram(s) IV Push once  diltiazem    milliGRAM(s) Oral daily  ferrous    sulfate 325 milliGRAM(s) Oral two times a day  furosemide    Tablet 40 milliGRAM(s) Oral two times a day  gabapentin 300 milliGRAM(s) Oral every 12 hours  glucagon  Injectable 1 milliGRAM(s) IntraMuscular once  insulin glargine Injectable (LANTUS) 10 Unit(s) SubCutaneous at bedtime  insulin lispro (ADMELOG) corrective regimen sliding scale   SubCutaneous at bedtime  insulin lispro (ADMELOG) corrective regimen sliding scale   SubCutaneous three times a day before meals  insulin lispro Injectable (ADMELOG) 5 Unit(s) SubCutaneous three times a day before meals  loratadine 10 milliGRAM(s) Oral daily  melatonin 5 milliGRAM(s) Oral at bedtime  metoclopramide 5 milliGRAM(s) Oral three times a day  multivitamin/minerals 1 Tablet(s) Oral daily  naloxegol 25 milliGRAM(s) Oral daily  oxybutynin 5 milliGRAM(s) Oral two times a day  oxyCODONE    IR 10 milliGRAM(s) Oral two times a day  pantoprazole    Tablet 40 milliGRAM(s) Oral before breakfast  polyethylene glycol 3350 17 Gram(s) Oral two times a day  predniSONE   Tablet 20 milliGRAM(s) Oral daily  saccharomyces boulardii 250 milliGRAM(s) Oral two times a day  senna 2 Tablet(s) Oral at bedtime  sodium chloride 0.65% Nasal 1 Spray(s) Both Nostrils daily  sucralfate 1 Gram(s) Oral two times a day  tiotropium 2.5 MICROgram(s) Inhaler 2 Puff(s) Inhalation daily    MEDICATIONS  (PRN):  acetaminophen     Tablet .. 650 milliGRAM(s) Oral every 6 hours PRN Temp greater or equal to 38C (100.4F), Mild Pain (1 - 3)  aluminum hydroxide/magnesium hydroxide/simethicone Suspension 30 milliLiter(s) Oral every 4 hours PRN Dyspepsia  dextrose Oral Gel 15 Gram(s) Oral once PRN Blood Glucose LESS THAN 70 milliGRAM(s)/deciliter  ondansetron Injectable 4 milliGRAM(s) IV Push every 6 hours PRN Nausea and/or Vomiting      Allergies    No Known Drug Allergies  fish (Hives)    Intolerances        Vital Signs Last 24 Hrs  T(C): 36.4 (28 Mar 2024 04:45), Max: 36.7 (27 Mar 2024 12:28)  T(F): 97.6 (28 Mar 2024 04:45), Max: 98.1 (27 Mar 2024 12:28)  HR: 90 (28 Mar 2024 04:45) (89 - 95)  BP: 119/68 (28 Mar 2024 04:45) (102/73 - 125/80)  BP(mean): --  RR: 18 (28 Mar 2024 04:45) (18 - 18)  SpO2: 93% (28 Mar 2024 04:45) (93% - 96%)    Parameters below as of 28 Mar 2024 04:45  Patient On (Oxygen Delivery Method): room air        I&O's Summary    27 Mar 2024 07:01  -  28 Mar 2024 07:00  --------------------------------------------------------  IN: 0 mL / OUT: 1200 mL / NET: -1200 mL          PHYSICAL EXAM:  TELE: Af  3 beats vt   Constitutional: NAD, awake and alert, well-developed  HEENT: Moist Mucous Membranes, Anicteric  Pulmonary: Non-labored, breath sounds are clear bilaterally, No wheezing, crackles or rhonchi  Cardiovascular: IRRegular, S1 and S2 nl, murmur   Gastrointestinal: Bowel Sounds present, soft, nontender.   Lymph: No lymphadenopathy. No peripheral edema.  Skin: No visible rashes or ulcers. right foot dressing   Psych:  Mood & affect appropriate    LABS: All Labs Reviewed:                        12. )-----------( 298      ( 28 Mar 2024 06:34 )             37.4                         13.2   11.88 )-----------( 327      ( 27 Mar 2024 06:12 )             41.0                         13.5   10.72 )-----------( 304      ( 26 Mar 2024 07:20 )             41.7     28 Mar 2024 06:34    141    |  101    |  18     ----------------------------<  307    4.7     |  32     |  1.10   27 Mar 2024 06:12    143    |  104    |  17     ----------------------------<  139    3.4     |  29     |  1.00   26 Mar 2024 07:20    143    |  104    |  19     ----------------------------<  178    4.1     |  32     |  1.20     Ca    8.3        28 Mar 2024 06:34  Ca    8.5        27 Mar 2024 06:12  Ca    8.8        26 Mar 2024 07:20  Mg     1.9       28 Mar 2024 06:34  Mg     1.9       27 Mar 2024 06:12  Mg     2.0       26 Mar 2024 07:20               EC Lead ECG:   Ventricular Rate 154 BPM    QRS Duration 76 ms    Q-T Interval 282 ms    QTC Calculation(Bazett) 451 ms    R Axis 15 degrees    T Axis 194 degrees    Diagnosis Line *** Critical Test Result: High HR  Atrial fibrillation with rapid ventricular response  Low voltage QRS  Nonspecific ST and T wave abnormality    Confirmed by JOHN KU ([id number]) on 3/17/2024 7:30:13 AM (24 @ 23:50)      TRANSTHORACIC ECHOCARDIOGRAM REPORT  ________________________________________________________________________________                                      _______       Pt. Name:       SARAH MORRISON Study Date:    3/18/2024  MRN:            [id number]         YOB: 1970  Accession #:    [id number]        Age:           53 years  Account#:       [id number]       Gender:        M  Heart Rate:                      Height:        74.02 in (188.00 cm)  Rhythm:                          Weight:        244.71 lb (111.00 kg)  Blood Pressure: 100/60 mmHg      BSA/BMI:       2.37 m² / 31.41 kg/m²  ________________________________________________________________________________________  Referring Physician:    [id number] Hill Brizuela  Interpreting Physician: Mary Maya MD  Primary Sonographer:    Mounika FELDMAN    CPT:               ECHO TTE WO CON COMP W DOPP - 57466.m  Indication(s):     Dyspnea, unspecified - R06.00  Procedure:         Transthoracic echocardiogram with 2-D, M-mode and complete                     spectral and color flow Doppler.  Ordering Location: [facility]  Admission Status:  Inpatient  Study Information: Image quality for this study is technically difficult.    _______________________________________________________________________________________     CONCLUSIONS:      1. Technically difficult image quality.   2. Left ventricular cavity is normal in size. Left ventricular wall thickness is normal. Left ventricular systolic function is normal with an ejection fraction visually estimated at 50 to 55 %.   3. Normal right ventricular cavity size, with normal wall thickness, and mildly reduced systolic function.   4. The left atrium is moderately dilated.   5. The right atrium is moderately dilated.   6. Moderate to severe mitral regurgitation.   7. Mild to moderate tricuspid regurgitation.   8. Estimated pulmonary artery systolic pressure is 36 mmHg, consistent with mild pulmonary hypertension.   9. The inferior vena cava is dilated measuring 2.40 cm in diameter, (dilated >2.1cm) with normal inspiratory collapse (normal >50%) consistent with mildly elevated right atrial pressure (~8, range 5-10mmHg).  10. Trace pericardial effusion.    ________________________________________________________________________________________  FINDINGS:     Left Ventricle:  The left ventricular cavity is normal in size. Left ventricular wall thickness is normal. Left ventricular systolic function is normal with an ejection fraction visually estimated at 50 to 55%.The left ventricular diastolic function is indeterminate.     Right Ventricle:  The right ventricular cavity is normal in size, with normal wall thickness and mildly reduced systolic function.     Left Atrium:  The left atrium is moderately dilated.     Right Atrium:  The right atrium is moderately dilated.     Aortic Valve:  The aortic valve anatomy cannot be determined with normal systolic excursion. There is no aortic valve stenosis.     Mitral Valve:  There is mild calcification of the mitralvalve annulus. There is moderate to severe mitral regurgitation.     Tricuspid Valve:  Structurally normal tricuspid valve with normal leaflet excursion. There is mild to moderate tricuspid regurgitation. Estimated pulmonary artery systolic pressure is 36 mmHg, consistent with mild pulmonary hypertension.     Pulmonic Valve:  The pulmonic valve was not well visualized. There is trace pulmonic regurgitation.     Pericardium:  There is a trace pericardial effusion.     Systemic Veins:  The inferiorvena cava is dilated measuring 2.40 cm in diameter, (dilated >2.1cm) with normal inspiratory collapse (normal >50%) consistent with mildly elevated right atrial pressure (~8, range 5-10mmHg).  ____________________________________________________________________  QUANTITATIVE DATA:  Left Ventricle Measurements: (Indexed to BSA)     IVSd (2D):   1.9 cm  LVPWd (2D):  1.5 cm  LVIDd (2D):  5.3 cm  LVIDs (2D):  3.9 cm  LV Mass:     413 g  174.4 g/m²  Visualized LV EF%: 50 to 55%     MV E Vmax:    1.15 m/s  e' lateral:   10.90 cm/s  e' medial:    10.50 cm/s  E/e' lateral: 10.56  E/e' medial:  12.00  E/e' Average: 10.76  MV DT:        137 msec    Aorta Measurements: (Normal range) (Indexed to BSA)     Sinuses of Valsalva: 3.50 cm (3.1 - 3.7 cm)       Left Atrium Measurements: (Indexed to BSA)  LA Diam 2D: 3.99 cm       LVOT / RVOT/ Qp/Qs Data: (Indexed to BSA)  LVOT Diameter: 2.22 cm    Mitral Valve Measurements:     MV E Vmax: 1.2 m/s       Tricuspid Valve Measurements:     TR Vmax:          2.6m/s  TR Peak Gradient: 27.7 mmHg  RA Pressure:      8 mmHg  PASP:             36 mmHg    ________________________________________________________________________________________  Electronically signed on 3/19/2024 at 11:27:59 AM by Mary Maya MD         *** Final ***      Radiology:         [facility] -- Brittany Lutz Pannella, Patel, Savella Goodger, Cohen  Office # [phone]      Follow Up:    Af   Subjective/Observations:   No events overnight resting comfortably in bed.  No complaints of chest pain, dyspnea, or palpitations reported. No signs of orthopnea or PND.  remains on room air       REVIEW OF SYSTEMS: All other review of systems is negative unless indicated above    PAST MEDICAL & SURGICAL HISTORY:  Diabetes      Diabetes mellitus with no complication      Afib      Hypertension      BPH (benign prostatic hyperplasia)      Perforated gastric ulcer  s/p emergent ex-lap omentopexy and plication 2019      Pulmonary embolism      History of non-ST elevation myocardial infarction (NSTEMI)      Osteomyelitis  s/p debridement      CAD S/P percutaneous coronary angioplasty      Cerebrovascular accident      H/O abdominal surgery      Perforated gastric ulcer      Traumatic amputation of left foot, initial encounter          MEDICATIONS  (STANDING):  apixaban 5 milliGRAM(s) Oral every 12 hours  ascorbic acid 500 milliGRAM(s) Oral daily  aspirin  chewable 81 milliGRAM(s) Oral daily  atorvastatin 40 milliGRAM(s) Oral at bedtime  bisacodyl 10 milliGRAM(s) Oral daily  cholecalciferol 1000 Unit(s) Oral daily  dextrose 5%. 1000 milliLiter(s) (100 mL/Hr) IV Continuous <Continuous>  dextrose 5%. 1000 milliLiter(s) (50 mL/Hr) IV Continuous <Continuous>  dextrose 50% Injectable 12.5 Gram(s) IV Push once  dextrose 50% Injectable 25 Gram(s) IV Push once  diltiazem    milliGRAM(s) Oral daily  ferrous    sulfate 325 milliGRAM(s) Oral two times a day  furosemide    Tablet 40 milliGRAM(s) Oral two times a day  gabapentin 300 milliGRAM(s) Oral every 12 hours  glucagon  Injectable 1 milliGRAM(s) IntraMuscular once  insulin glargine Injectable (LANTUS) 10 Unit(s) SubCutaneous at bedtime  insulin lispro (ADMELOG) corrective regimen sliding scale   SubCutaneous at bedtime  insulin lispro (ADMELOG) corrective regimen sliding scale   SubCutaneous three times a day before meals  insulin lispro Injectable (ADMELOG) 5 Unit(s) SubCutaneous three times a day before meals  loratadine 10 milliGRAM(s) Oral daily  melatonin 5 milliGRAM(s) Oral at bedtime  metoclopramide 5 milliGRAM(s) Oral three times a day  multivitamin/minerals 1 Tablet(s) Oral daily  naloxegol 25 milliGRAM(s) Oral daily  oxybutynin 5 milliGRAM(s) Oral two times a day  oxyCODONE    IR 10 milliGRAM(s) Oral two times a day  pantoprazole    Tablet 40 milliGRAM(s) Oral before breakfast  polyethylene glycol 3350 17 Gram(s) Oral two times a day  predniSONE   Tablet 20 milliGRAM(s) Oral daily  saccharomyces boulardii 250 milliGRAM(s) Oral two times a day  senna 2 Tablet(s) Oral at bedtime  sodium chloride 0.65% Nasal 1 Spray(s) Both Nostrils daily  sucralfate 1 Gram(s) Oral two times a day  tiotropium 2.5 MICROgram(s) Inhaler 2 Puff(s) Inhalation daily    MEDICATIONS  (PRN):  acetaminophen     Tablet .. 650 milliGRAM(s) Oral every 6 hours PRN Temp greater or equal to 38C (100.4F), Mild Pain (1 - 3)  aluminum hydroxide/magnesium hydroxide/simethicone Suspension 30 milliLiter(s) Oral every 4 hours PRN Dyspepsia  dextrose Oral Gel 15 Gram(s) Oral once PRN Blood Glucose LESS THAN 70 milliGRAM(s)/deciliter  ondansetron Injectable 4 milliGRAM(s) IV Push every 6 hours PRN Nausea and/or Vomiting      Allergies    No Known Drug Allergies  fish (Hives)    Intolerances        Vital Signs Last 24 Hrs  T(C): 36.4 (28 Mar 2024 04:45), Max: 36.7 (27 Mar 2024 12:28)  T(F): 97.6 (28 Mar 2024 04:45), Max: 98.1 (27 Mar 2024 12:28)  HR: 90 (28 Mar 2024 04:45) (89 - 95)  BP: 119/68 (28 Mar 2024 04:45) (102/73 - 125/80)  BP(mean): --  RR: 18 (28 Mar 2024 04:45) (18 - 18)  SpO2: 93% (28 Mar 2024 04:45) (93% - 96%)    Parameters below as of 28 Mar 2024 04:45  Patient On (Oxygen Delivery Method): room air        I&O's Summary    27 Mar 2024 07:01  -  28 Mar 2024 07:00  --------------------------------------------------------  IN: 0 mL / OUT: 1200 mL / NET: -1200 mL          PHYSICAL EXAM:  TELE: Af  3 beats NSVT   Constitutional: NAD, awake and alert, well-developed  HEENT: Moist Mucous Membranes, Anicteric  Pulmonary: Non-labored, breath sounds are clear bilaterally, No wheezing, crackles or rhonchi  Cardiovascular: IRRegular, S1 and S2 nl, murmur   Gastrointestinal: Bowel Sounds present, soft, nontender.   Lymph: No lymphadenopathy. No peripheral edema.  Skin: No visible rashes or ulcers. right foot dressing   Psych:  Mood & affect appropriate    LABS: All Labs Reviewed:                        12. )-----------( 298      ( 28 Mar 2024 06:34 )             37.4                         13.2   11.88 )-----------( 327      ( 27 Mar 2024 06:12 )             41.0                         13.5   10.72 )-----------( 304      ( 26 Mar 2024 07:20 )             41.7     28 Mar 2024 06:34    141    |  101    |  18     ----------------------------<  307    4.7     |  32     |  1.10   27 Mar 2024 06:12    143    |  104    |  17     ----------------------------<  139    3.4     |  29     |  1.00   26 Mar 2024 07:20    143    |  104    |  19     ----------------------------<  178    4.1     |  32     |  1.20     Ca    8.3        28 Mar 2024 06:34  Ca    8.5        27 Mar 2024 06:12  Ca    8.8        26 Mar 2024 07:20  Mg     1.9       28 Mar 2024 06:34  Mg     1.9       27 Mar 2024 06:12  Mg     2.0       26 Mar 2024 07:20               EC Lead ECG:   Ventricular Rate 154 BPM    QRS Duration 76 ms    Q-T Interval 282 ms    QTC Calculation(Bazett) 451 ms    R Axis 15 degrees    T Axis 194 degrees    Diagnosis Line *** Critical Test Result: High HR  Atrial fibrillation with rapid ventricular response  Low voltage QRS  Nonspecific ST and T wave abnormality    Confirmed by JOHN KU ([id number]) on 3/17/2024 7:30:13 AM (24 @ 23:50)      TRANSTHORACIC ECHOCARDIOGRAM REPORT  ________________________________________________________________________________                                      _______       Pt. Name:       SARAH MORRISON Study Date:    3/18/2024  MRN:            [id number]         YOB: 1970  Accession #:    [id number]        Age:           53 years  Account#:       [id number]       Gender:        M  Heart Rate:                      Height:        74.02 in (188.00 cm)  Rhythm:                          Weight:        244.71 lb (111.00 kg)  Blood Pressure: 100/60 mmHg      BSA/BMI:       2.37 m² / 31.41 kg/m²  ________________________________________________________________________________________  Referring Physician:    [id number] Hill Brizuela  Interpreting Physician: Mary Maya MD  Primary Sonographer:    Mounika FELDMAN    CPT:               ECHO TTE WO CON COMP W DOPP - 45071.m  Indication(s):     Dyspnea, unspecified - R06.00  Procedure:         Transthoracic echocardiogram with 2-D, M-mode and complete                     spectral and color flow Doppler.  Ordering Location: [facility]  Admission Status:  Inpatient  Study Information: Image quality for this study is technically difficult.    _______________________________________________________________________________________     CONCLUSIONS:      1. Technically difficult image quality.   2. Left ventricular cavity is normal in size. Left ventricular wall thickness is normal. Left ventricular systolic function is normal with an ejection fraction visually estimated at 50 to 55 %.   3. Normal right ventricular cavity size, with normal wall thickness, and mildly reduced systolic function.   4. The left atrium is moderately dilated.   5. The right atrium is moderately dilated.   6. Moderate to severe mitral regurgitation.   7. Mild to moderate tricuspid regurgitation.   8. Estimated pulmonary artery systolic pressure is 36 mmHg, consistent with mild pulmonary hypertension.   9. The inferior vena cava is dilated measuring 2.40 cm in diameter, (dilated >2.1cm) with normal inspiratory collapse (normal >50%) consistent with mildly elevated right atrial pressure (~8, range 5-10mmHg).  10. Trace pericardial effusion.    ________________________________________________________________________________________  FINDINGS:     Left Ventricle:  The left ventricular cavity is normal in size. Left ventricular wall thickness is normal. Left ventricular systolic function is normal with an ejection fraction visually estimated at 50 to 55%.The left ventricular diastolic function is indeterminate.     Right Ventricle:  The right ventricular cavity is normal in size, with normal wall thickness and mildly reduced systolic function.     Left Atrium:  The left atrium is moderately dilated.     Right Atrium:  The right atrium is moderately dilated.     Aortic Valve:  The aortic valve anatomy cannot be determined with normal systolic excursion. There is no aortic valve stenosis.     Mitral Valve:  There is mild calcification of the mitralvalve annulus. There is moderate to severe mitral regurgitation.     Tricuspid Valve:  Structurally normal tricuspid valve with normal leaflet excursion. There is mild to moderate tricuspid regurgitation. Estimated pulmonary artery systolic pressure is 36 mmHg, consistent with mild pulmonary hypertension.     Pulmonic Valve:  The pulmonic valve was not well visualized. There is trace pulmonic regurgitation.     Pericardium:  There is a trace pericardial effusion.     Systemic Veins:  The inferiorvena cava is dilated measuring 2.40 cm in diameter, (dilated >2.1cm) with normal inspiratory collapse (normal >50%) consistent with mildly elevated right atrial pressure (~8, range 5-10mmHg).  ____________________________________________________________________  QUANTITATIVE DATA:  Left Ventricle Measurements: (Indexed to BSA)     IVSd (2D):   1.9 cm  LVPWd (2D):  1.5 cm  LVIDd (2D):  5.3 cm  LVIDs (2D):  3.9 cm  LV Mass:     413 g  174.4 g/m²  Visualized LV EF%: 50 to 55%     MV E Vmax:    1.15 m/s  e' lateral:   10.90 cm/s  e' medial:    10.50 cm/s  E/e' lateral: 10.56  E/e' medial:  12.00  E/e' Average: 10.76  MV DT:        137 msec    Aorta Measurements: (Normal range) (Indexed to BSA)     Sinuses of Valsalva: 3.50 cm (3.1 - 3.7 cm)       Left Atrium Measurements: (Indexed to BSA)  LA Diam 2D: 3.99 cm       LVOT / RVOT/ Qp/Qs Data: (Indexed to BSA)  LVOT Diameter: 2.22 cm    Mitral Valve Measurements:     MV E Vmax: 1.2 m/s       Tricuspid Valve Measurements:     TR Vmax:          2.6m/s  TR Peak Gradient: 27.7 mmHg  RA Pressure:      8 mmHg  PASP:             36 mmHg    ________________________________________________________________________________________  Electronically signed on 3/19/2024 at 11:27:59 AM by Mary Maya MD         *** Final ***      Radiology:

## 2024-03-28 NOTE — PROGRESS NOTE ADULT - PROBLEM SELECTOR PLAN 3
S/P selective debridement of R heal with bone bx -- POD #1  Continue post-op care  F/U intra-op cultures and cytopath  Podiatry/ID f/u S/P selective debridement of R heal with bone bx -- POD #2  Continue post-op care  F/U intra-op cultures noted --  started on amoxacillin and iv cefazolin  Podiatry/ID f/u

## 2024-03-28 NOTE — PROGRESS NOTE ADULT - SUBJECTIVE AND OBJECTIVE BOX
CAPILLARY BLOOD GLUCOSE      POCT Blood Glucose.: 269 mg/dL (28 Mar 2024 08:15)  POCT Blood Glucose.: 195 mg/dL (27 Mar 2024 21:27)  POCT Blood Glucose.: 216 mg/dL (27 Mar 2024 17:00)  POCT Blood Glucose.: 208 mg/dL (27 Mar 2024 12:15)      Vital Signs Last 24 Hrs  T(C): 36.4 (28 Mar 2024 04:45), Max: 36.7 (27 Mar 2024 12:28)  T(F): 97.6 (28 Mar 2024 04:45), Max: 98.1 (27 Mar 2024 12:28)  HR: 90 (28 Mar 2024 04:45) (89 - 95)  BP: 119/68 (28 Mar 2024 04:45) (102/73 - 125/80)  BP(mean): --  RR: 18 (28 Mar 2024 04:45) (18 - 18)  SpO2: 93% (28 Mar 2024 04:45) (93% - 96%)    Parameters below as of 28 Mar 2024 04:45  Patient On (Oxygen Delivery Method): room air        General: WN/WD NAD  Respiratory: CTA B/L  CV: RRR, S1S2, no murmurs, rubs or gallops  Abdominal: Soft, NT, ND +BS, Last BM  Extremities: No edema, + peripheral pulses     03-28    141  |  101  |  18  ----------------------------<  307<H>  4.7   |  32<H>  |  1.10    Ca    8.3<L>      28 Mar 2024 06:34  Mg     1.9     03-28        atorvastatin 40 milliGRAM(s) Oral at bedtime  dextrose 50% Injectable 12.5 Gram(s) IV Push once  dextrose 50% Injectable 25 Gram(s) IV Push once  dextrose Oral Gel 15 Gram(s) Oral once PRN  glucagon  Injectable 1 milliGRAM(s) IntraMuscular once  insulin glargine Injectable (LANTUS) 7 Unit(s) SubCutaneous at bedtime  insulin lispro (ADMELOG) corrective regimen sliding scale   SubCutaneous at bedtime  insulin lispro (ADMELOG) corrective regimen sliding scale   SubCutaneous three times a day before meals  insulin lispro Injectable (ADMELOG) 5 Unit(s) SubCutaneous three times a day before meals  predniSONE   Tablet 20 milliGRAM(s) Oral daily

## 2024-03-28 NOTE — PROGRESS NOTE ADULT - ASSESSMENT
53M with PMH of AF on Eliquis, indwelling jacques, CAD s/p stents, CVA, DM, HTN, osteomyelitis s/p debridement, PE, sent from [facility] for elevated BP, SOB, and dislodged jacques    AF  Emphysema  Atelectasis  CAD  CVA  DM  HTN  OP  OA  hx of OM  hx of PE    POD 2  cx in progress  Podiatry f/u  right Foot Surgery    ct neg for pe - atelectasis - emphysema  spiriva - nebs prn - VBG  cardio eval in progress  cvs rx regimen  proBNP noted to be elevated  diuresis - as per cardio recs  TTE reviewed  AF management  rate and rhythm control  GERARDO eval as outpatient  monitored unit admission  pulse ox monitoring  goal sat > 88 pct  DM care  serial FS  OLD records reviewed

## 2024-03-28 NOTE — PROGRESS NOTE ADULT - ASSESSMENT
54 y/o M PMhx AF on Eliquis, chronic jacques, CAD s/p stents, CVA, DM, HTN, PE who presented w/ SOB, and dislodged jacques as well as dysuria.  also found to have Afib w/ RVR  admitted with  concerns for sepsis, CAUTI, dislodged jacques  reported dysuria  jacques placed in ED  CT abd/pelvis- No hydronephrosis or nephrolithiasis.    Recommendations  1-Bacteremia ESBL E coli (suspected pyelonephritis)   -  Escherichia coli: Detec   -  ESBL: Detec   -  CTX-M Resistance Marker: Detec  Culture - Urine (03.16.24 @ 22:35)  >100,000 CFU/ml Escherichia coli ESBL  Repeat blood cultures collected 3/18 5am - NGTD past 48 hours  sp-ertapenem started 3/17 with last day 3/26    2-Foot wound open area on pressure location, now dressed, podiatry involved for this unstageable ulcer with rec  Xray-no evidence of osteo  MRI-not done with aneurysm clips so biopsy 3/26  Path-PENDING but expected Friday  Micro-Few Staphylococcus aureus, Moderate Enterococcus faecalis  MRSA/MSSA PCR (03.21.24 @ 10:00)    MRSA PCR Result.: NotDetec:    Staph aureus PCR Result:   3/28 will start cefazolin (MSSA)/amoxicillin (E faecalis) with recs to follow based on path    3-Rash chronic diffuse rash present for months and likely related to contact dermatitis from detergent    Thank you for consulting us and involving us in the management of this most interesting and challenging case.  We will follow along in the care of this patient. Please call us at [phone] or text me directly on my cell# at [phone] with any concerns.

## 2024-03-28 NOTE — PROVIDER CONTACT NOTE (CRITICAL VALUE NOTIFICATION) - TEST AND RESULT REPORTED:
3/26 Tissue culture: positive for rare staphylococcus aureus
blood cultures drawn on 3- final result aeobic and anaerobic bottles E. Coli ESBL

## 2024-03-29 ENCOUNTER — TRANSCRIPTION ENCOUNTER (OUTPATIENT)
Age: 54
End: 2024-03-29

## 2024-03-29 VITALS
TEMPERATURE: 98 F | SYSTOLIC BLOOD PRESSURE: 119 MMHG | DIASTOLIC BLOOD PRESSURE: 77 MMHG | RESPIRATION RATE: 18 BRPM | OXYGEN SATURATION: 93 % | HEART RATE: 95 BPM

## 2024-03-29 LAB
ANION GAP SERPL CALC-SCNC: 7 MMOL/L — SIGNIFICANT CHANGE UP (ref 5–17)
BUN SERPL-MCNC: 17 MG/DL — SIGNIFICANT CHANGE UP (ref 7–23)
CALCIUM SERPL-MCNC: 8.3 MG/DL — LOW (ref 8.5–10.1)
CHLORIDE SERPL-SCNC: 105 MMOL/L — SIGNIFICANT CHANGE UP (ref 96–108)
CO2 SERPL-SCNC: 30 MMOL/L — SIGNIFICANT CHANGE UP (ref 22–31)
CREAT SERPL-MCNC: 1.1 MG/DL — SIGNIFICANT CHANGE UP (ref 0.5–1.3)
EGFR: 79 ML/MIN/1.73M2 — SIGNIFICANT CHANGE UP
ERYTHROCYTE [SEDIMENTATION RATE] IN BLOOD: 12 MM/HR — SIGNIFICANT CHANGE UP (ref 0–20)
GLUCOSE BLDC GLUCOMTR-MCNC: 232 MG/DL — HIGH (ref 70–99)
GLUCOSE BLDC GLUCOMTR-MCNC: 273 MG/DL — HIGH (ref 70–99)
GLUCOSE SERPL-MCNC: 290 MG/DL — HIGH (ref 70–99)
HCT VFR BLD CALC: 37.4 % — LOW (ref 39–50)
HGB BLD-MCNC: 12.4 G/DL — LOW (ref 13–17)
MAGNESIUM SERPL-MCNC: 1.6 MG/DL — SIGNIFICANT CHANGE UP (ref 1.6–2.6)
MCHC RBC-ENTMCNC: 30.6 PG — SIGNIFICANT CHANGE UP (ref 27–34)
MCHC RBC-ENTMCNC: 33.2 GM/DL — SIGNIFICANT CHANGE UP (ref 32–36)
MCV RBC AUTO: 92.3 FL — SIGNIFICANT CHANGE UP (ref 80–100)
NRBC # BLD: 0 /100 WBCS — SIGNIFICANT CHANGE UP (ref 0–0)
PLATELET # BLD AUTO: 325 K/UL — SIGNIFICANT CHANGE UP (ref 150–400)
POTASSIUM SERPL-MCNC: 3.4 MMOL/L — LOW (ref 3.5–5.3)
POTASSIUM SERPL-SCNC: 3.4 MMOL/L — LOW (ref 3.5–5.3)
RBC # BLD: 4.05 M/UL — LOW (ref 4.2–5.8)
RBC # FLD: 12.9 % — SIGNIFICANT CHANGE UP (ref 10.3–14.5)
SODIUM SERPL-SCNC: 142 MMOL/L — SIGNIFICANT CHANGE UP (ref 135–145)
WBC # BLD: 13.17 K/UL — HIGH (ref 3.8–10.5)
WBC # FLD AUTO: 13.17 K/UL — HIGH (ref 3.8–10.5)

## 2024-03-29 PROCEDURE — 80053 COMPREHEN METABOLIC PANEL: CPT

## 2024-03-29 PROCEDURE — 82803 BLOOD GASES ANY COMBINATION: CPT

## 2024-03-29 PROCEDURE — 87070 CULTURE OTHR SPECIMN AEROBIC: CPT

## 2024-03-29 PROCEDURE — 85730 THROMBOPLASTIN TIME PARTIAL: CPT

## 2024-03-29 PROCEDURE — 85027 COMPLETE CBC AUTOMATED: CPT

## 2024-03-29 PROCEDURE — 73630 X-RAY EXAM OF FOOT: CPT

## 2024-03-29 PROCEDURE — 85652 RBC SED RATE AUTOMATED: CPT

## 2024-03-29 PROCEDURE — 85610 PROTHROMBIN TIME: CPT

## 2024-03-29 PROCEDURE — 84100 ASSAY OF PHOSPHORUS: CPT

## 2024-03-29 PROCEDURE — 99285 EMERGENCY DEPT VISIT HI MDM: CPT | Mod: 25

## 2024-03-29 PROCEDURE — 93306 TTE W/DOPPLER COMPLETE: CPT

## 2024-03-29 PROCEDURE — 36415 COLL VENOUS BLD VENIPUNCTURE: CPT

## 2024-03-29 PROCEDURE — 87086 URINE CULTURE/COLONY COUNT: CPT

## 2024-03-29 PROCEDURE — 83036 HEMOGLOBIN GLYCOSYLATED A1C: CPT

## 2024-03-29 PROCEDURE — 87640 STAPH A DNA AMP PROBE: CPT

## 2024-03-29 PROCEDURE — 87641 MR-STAPH DNA AMP PROBE: CPT

## 2024-03-29 PROCEDURE — 99232 SBSQ HOSP IP/OBS MODERATE 35: CPT

## 2024-03-29 PROCEDURE — 76937 US GUIDE VASCULAR ACCESS: CPT

## 2024-03-29 PROCEDURE — 83605 ASSAY OF LACTIC ACID: CPT

## 2024-03-29 PROCEDURE — 81001 URINALYSIS AUTO W/SCOPE: CPT

## 2024-03-29 PROCEDURE — 87040 BLOOD CULTURE FOR BACTERIA: CPT

## 2024-03-29 PROCEDURE — 0225U NFCT DS DNA&RNA 21 SARSCOV2: CPT

## 2024-03-29 PROCEDURE — 80162 ASSAY OF DIGOXIN TOTAL: CPT

## 2024-03-29 PROCEDURE — C1751: CPT

## 2024-03-29 PROCEDURE — 84480 ASSAY TRIIODOTHYRONINE (T3): CPT

## 2024-03-29 PROCEDURE — 84436 ASSAY OF TOTAL THYROXINE: CPT

## 2024-03-29 PROCEDURE — 80048 BASIC METABOLIC PNL TOTAL CA: CPT

## 2024-03-29 PROCEDURE — 87150 DNA/RNA AMPLIFIED PROBE: CPT

## 2024-03-29 PROCEDURE — 87186 SC STD MICRODIL/AGAR DIL: CPT

## 2024-03-29 PROCEDURE — 84443 ASSAY THYROID STIM HORMONE: CPT

## 2024-03-29 PROCEDURE — 85025 COMPLETE CBC W/AUTO DIFF WBC: CPT

## 2024-03-29 PROCEDURE — 71045 X-RAY EXAM CHEST 1 VIEW: CPT

## 2024-03-29 PROCEDURE — 88311 DECALCIFY TISSUE: CPT

## 2024-03-29 PROCEDURE — 96374 THER/PROPH/DIAG INJ IV PUSH: CPT

## 2024-03-29 PROCEDURE — 84145 PROCALCITONIN (PCT): CPT

## 2024-03-29 PROCEDURE — 83880 ASSAY OF NATRIURETIC PEPTIDE: CPT

## 2024-03-29 PROCEDURE — 83735 ASSAY OF MAGNESIUM: CPT

## 2024-03-29 PROCEDURE — 84439 ASSAY OF FREE THYROXINE: CPT

## 2024-03-29 PROCEDURE — 88304 TISSUE EXAM BY PATHOLOGIST: CPT

## 2024-03-29 PROCEDURE — 74176 CT ABD & PELVIS W/O CONTRAST: CPT | Mod: MC

## 2024-03-29 PROCEDURE — 93005 ELECTROCARDIOGRAM TRACING: CPT

## 2024-03-29 PROCEDURE — 82962 GLUCOSE BLOOD TEST: CPT

## 2024-03-29 PROCEDURE — 87075 CULTR BACTERIA EXCEPT BLOOD: CPT

## 2024-03-29 PROCEDURE — 94640 AIRWAY INHALATION TREATMENT: CPT

## 2024-03-29 PROCEDURE — 71275 CT ANGIOGRAPHY CHEST: CPT | Mod: MC

## 2024-03-29 PROCEDURE — 36573 INSJ PICC RS&I 5 YR+: CPT

## 2024-03-29 PROCEDURE — 87077 CULTURE AEROBIC IDENTIFY: CPT

## 2024-03-29 PROCEDURE — 84484 ASSAY OF TROPONIN QUANT: CPT

## 2024-03-29 RX ORDER — DILTIAZEM HCL 120 MG
1 CAPSULE, EXT RELEASE 24 HR ORAL
Qty: 0 | Refills: 0 | DISCHARGE
Start: 2024-03-29

## 2024-03-29 RX ORDER — AMOXICILLIN 250 MG/5ML
2 SUSPENSION, RECONSTITUTED, ORAL (ML) ORAL
Qty: 0 | Refills: 0 | DISCHARGE
Start: 2024-03-29

## 2024-03-29 RX ORDER — METOPROLOL TARTRATE 50 MG
1 TABLET ORAL
Qty: 0 | Refills: 0 | DISCHARGE

## 2024-03-29 RX ORDER — POTASSIUM CHLORIDE 20 MEQ
40 PACKET (EA) ORAL ONCE
Refills: 0 | Status: COMPLETED | OUTPATIENT
Start: 2024-03-29 | End: 2024-03-29

## 2024-03-29 RX ORDER — TIOTROPIUM BROMIDE 18 UG/1
2 CAPSULE ORAL; RESPIRATORY (INHALATION)
Qty: 0 | Refills: 0 | DISCHARGE
Start: 2024-03-29

## 2024-03-29 RX ADMIN — Medication 4: at 08:48

## 2024-03-29 RX ADMIN — GABAPENTIN 300 MILLIGRAM(S): 400 CAPSULE ORAL at 05:33

## 2024-03-29 RX ADMIN — Medication 325 MILLIGRAM(S): at 05:34

## 2024-03-29 RX ADMIN — NALOXEGOL OXALATE 25 MILLIGRAM(S): 12.5 TABLET, FILM COATED ORAL at 12:15

## 2024-03-29 RX ADMIN — Medication 1000 MILLIGRAM(S): at 05:34

## 2024-03-29 RX ADMIN — Medication 100 MILLIGRAM(S): at 05:33

## 2024-03-29 RX ADMIN — OXYCODONE HYDROCHLORIDE 10 MILLIGRAM(S): 5 TABLET ORAL at 05:34

## 2024-03-29 RX ADMIN — Medication 5 MILLIGRAM(S): at 13:11

## 2024-03-29 RX ADMIN — Medication 5 MILLIGRAM(S): at 05:34

## 2024-03-29 RX ADMIN — Medication 40 MILLIEQUIVALENT(S): at 12:22

## 2024-03-29 RX ADMIN — POLYETHYLENE GLYCOL 3350 17 GRAM(S): 17 POWDER, FOR SOLUTION ORAL at 05:33

## 2024-03-29 RX ADMIN — Medication 6: at 12:24

## 2024-03-29 RX ADMIN — Medication 40 MILLIGRAM(S): at 13:11

## 2024-03-29 RX ADMIN — Medication 1000 MILLIGRAM(S): at 12:15

## 2024-03-29 RX ADMIN — Medication 5 UNIT(S): at 12:25

## 2024-03-29 RX ADMIN — Medication 5 UNIT(S): at 08:49

## 2024-03-29 RX ADMIN — Medication 250 MILLIGRAM(S): at 05:35

## 2024-03-29 RX ADMIN — Medication 10 MILLIGRAM(S): at 12:15

## 2024-03-29 RX ADMIN — Medication 1 SPRAY(S): at 12:14

## 2024-03-29 RX ADMIN — Medication 1 TABLET(S): at 12:15

## 2024-03-29 RX ADMIN — Medication 20 MILLIGRAM(S): at 05:34

## 2024-03-29 RX ADMIN — Medication 1000 UNIT(S): at 12:16

## 2024-03-29 RX ADMIN — Medication 81 MILLIGRAM(S): at 12:16

## 2024-03-29 RX ADMIN — OXYCODONE HYDROCHLORIDE 10 MILLIGRAM(S): 5 TABLET ORAL at 06:35

## 2024-03-29 RX ADMIN — Medication 40 MILLIGRAM(S): at 05:33

## 2024-03-29 RX ADMIN — Medication 1 GRAM(S): at 05:33

## 2024-03-29 RX ADMIN — APIXABAN 5 MILLIGRAM(S): 2.5 TABLET, FILM COATED ORAL at 05:34

## 2024-03-29 RX ADMIN — PANTOPRAZOLE SODIUM 40 MILLIGRAM(S): 20 TABLET, DELAYED RELEASE ORAL at 05:34

## 2024-03-29 RX ADMIN — Medication 100 MILLIGRAM(S): at 12:14

## 2024-03-29 RX ADMIN — Medication 5 MILLIGRAM(S): at 08:52

## 2024-03-29 RX ADMIN — LORATADINE 10 MILLIGRAM(S): 10 TABLET ORAL at 12:16

## 2024-03-29 RX ADMIN — Medication 500 MILLIGRAM(S): at 12:15

## 2024-03-29 NOTE — DISCHARGE NOTE NURSING/CASE MANAGEMENT/SOCIAL WORK - NSDCVIVACCINE_GEN_ALL_CORE_FT
pneumococcal polysaccharide PPV23; 31-Oct-2019 13:45; Gabbie Andrew (RN); [employer] [id number] (Exp. Date: 07-Aug-2020); IntraMuscular; Deltoid Right.; 0.5 milliLiter(s); VIS (VIS Published: 06-Oct-2009, VIS Presented: 31-Oct-2019);

## 2024-03-29 NOTE — DISCHARGE NOTE NURSING/CASE MANAGEMENT/SOCIAL WORK - PATIENT PORTAL LINK FT
You can access the FollowMyHealth Patient Portal offered by [facility] by registering at the following website: [email or web address]. By joining [facility]’s FollowMyHealth portal, you will also be able to view your health information using other applications (apps) compatible with our system.

## 2024-03-29 NOTE — PROGRESS NOTE ADULT - PROBLEM SELECTOR PLAN 1
Pt seen and evaluated  Pt is s/p right heel wound debridement with bone biopsy  Surgical pathology report pending  Dressings changed at this time  Rec cont elevation right lower extremity  Rec cont abx per Id  Will discuss with all attendings
Pt seen and evaluated  Pt is s/p right heel wound debridement with bone biopsy  Surgical pathology report reviewed  Dressings changed at this time  Rec cont elevation right lower extremity  Rec cont abx per Id  Will discuss with all attendings.  Wound Care Instructions:  -Keep dressing clean, dry, and intact to the right heel  -Apply adaptic, gauze, abd, paul, change every other day   -Patient weight bearing as tolerated in surgical shoe   -Monitor for any signs of infection including redness, swelling in the leg above the bandage, nausea/vomiting/fever/chills/chest pain/shortness of breath, if any are present patient must report to the emergency department immediately  -Patient is to follow up with Dr. Hale/Dr. Todd/ Dr. Jewell  within 5 days after discharge at [facility]. Please call to make an appointment [phone]
Septic shock likely 2/2 to CAUTI (jacques changed in ER)  + ESBL bacteremia  Now on ertapenem until 3/26 -- s/p midline  F/U culture data and repeat cultures -- NTD  ID f/u  Further work-up/management pending clinical course.
cont lantus 15 units qam/7 units qpm  increase admelog 5 units 3x/day before meals  cont mod dose admelog corrective scale coverage qac/qhs  cont cons cho diet  goal bg 100-180 in hosp setting
Septic shock likely 2/2 to CAUTI (jacques changed in ER)  + ESBL bacteremia  Now on ertapenem until 3/26 -- s/p midline  F/U culture data and repeat cultures -- NTD  ID f/u  Further work-up/management pending clinical course.
cont lantus 15qam/7qhs  cont mod dose admelog corrective scale coverage qac/qhs  cont cons cho diet  add admelog 3 units 3x/day before meals  goal bg 100-180 in hosp setting
Septic shock likely 2/2 to CAUTI (jacques changed in ER)  + ESBL bacteremia  S/P ertapenem until 3/26   F/U culture data and repeat cultures -- NTD  ID f/u  Further work-up/management pending clinical course.
cont lantus 15 units qam/7 units qpm  cont admelog 5 units 3x/day before meals  cont admelog corrective scale coverage qac/qhs  cont cons cho diet  goal bg 100-180 in hosp setting
increase lantus 10 units qhs  cont admelog 5 units 3x/day before meals  cont admelog corrective scale coverage qac/qhs  cont cons cho diet  goal bg 100-180 in hosp setting
Septic shock likely 2/2 to CAUTI (jacques changed in ER)  + ESBL bacteremia  Now on ertapenem until 3/26 -- s/p midline  F/U culture data and repeat cultures -- NTD  ID f/u  Further work-up/management pending clinical course.
Septic shock likely 2/2 to CAUTI (jacques changed in ER)  + ESBL bacteremia  Now on ertapenem  Trend WBC, lactate  F/U culture data and repeat cultures -- NTD  Awaiting PICC/mid-line  ID f/u  Further work-up/management pending clinical course.
Septic shock likely 2/2 to CAUTI (jacques changed in ER)  + ESBL bacteremia  Now on ertapenem until 3/26 -- s/p midline  F/U culture data and repeat cultures -- NTD  ID f/u  Further work-up/management pending clinical course.
Septic shock likely 2/2 to CAUTI (jacques changed in ER)  + ESBL bacteremia  Now on ertapenem until 3/26 -- s/p midline  F/U culture data and repeat cultures -- NTD  ID f/u  Further work-up/management pending clinical course.
Septic shock likely 2/2 to CAUTI (jacques changed in ER)  + ESBL bacteremia  Now on ertapenem  Trend WBC, lactate  F/U culture data and repeat cultures  ID f/u  Further work-up/management pending clinical course.
Septic shock likely 2/2 to CAUTI (jacuqes changed in ER)  + ESBL bacteremia  S/P ertapenem until 3/26   F/U culture data and repeat cultures -- NTD  ID f/u  Further work-up/management pending clinical course.
Septic shock likely 2/2 to CAUTI (jacques changed in ER)  + ESBL bacteremia  Now on ertapenem  Trend WBC, lactate  F/U culture data  ID f/u  Further work-up/management pending clinical course.
Septic shock likely 2/2 to CAUTI (jacques changed in ER)  + ESBL bacteremia  Now on ertapenem  Trend WBC, lactate  F/U culture data and repeat cultures -- NTD  Awaiting PICC/mid-line  ID f/u  Further work-up/management pending clinical course.
Septic shock likely 2/2 to CAUTI (jacques changed in ER)  + ESBL bacteremia  S/P ertapenem until 3/26   F/U culture data and repeat cultures -- NTD  ID f/u  Further work-up/management pending clinical course.

## 2024-03-29 NOTE — DISCHARGE NOTE PROVIDER - NSDCMRMEDTOKEN_GEN_ALL_CORE_FT
Allegra 60 mg oral tablet: 1 tab(s) orally once a day  amoxicillin 500 mg oral capsule: 2 cap(s) orally 3 times a day through 4/3/24  apixaban 5 mg oral tablet: 1 tab(s) orally 2 times a day  aspirin 81 mg oral tablet, chewable: 1 tab(s) orally once a day  atorvastatin 40 mg oral tablet: 1 tab(s) orally once a day (at bedtime)  bisacodyl 10 mg rectal suppository: 1 suppository(ies) rectally once a day as needed for  constipation  bisacodyl 5 mg oral delayed release tablet: 2 tab(s) orally once a day  cholecalciferol 1250 mcg (50,000 intl units) oral capsule: 1 cap(s) orally once a month on the 15th  cranberry oral capsule: 1 orally once a day  dilTIAZem 360 mg/24 hours oral capsule, extended release: 1 cap(s) orally once a day  docusate sodium 100 mg oral capsule: 3 cap(s) orally once a day  famotidine 20 mg oral tablet: 1 tab(s) orally once a day (in the morning)  ferrous sulfate 325 mg (65 mg elemental iron) oral tablet: 1 tab(s) orally 2 times a day  Fleet Enema 19 g-7 g rectal enema: 133 milliliter(s) rectally as needed for  constipation  gabapentin 300 mg oral capsule: 1 cap(s) orally every 12 hours  HumuLIN R 100 units/mL injectable solution: Sliding scale  Keflex 500 mg oral capsule: 1 cap(s) orally 3 times a day through 4/3/24  Lantus 100 units/mL subcutaneous solution: 15 unit(s) subcutaneously once a day  Lantus 100 units/mL subcutaneous solution: 7 unit(s) subcutaneously once a day (at bedtime)  Lasix 40 mg oral tablet: 1 tab(s) orally 2 times a day  Melatonin 5 mg oral tablet: 1 tab(s) orally once a day  metFORMIN 1000 mg oral tablet: 1 tab(s) orally 2 times a day with morning and evening meals  metoclopramide 5 mg oral tablet: 1 tab(s) orally 3 times a day  Milk of Magnesia 1200 mg/15 mL oral liquid: 30 milliliter(s) orally once a day as needed for  constipation  Movantik 25 mg oral tablet: 1 tab(s) orally once a day  Multiple Vitamins with Minerals oral tablet: 1 tab(s) orally once a day  oxyBUTYnin 10 mg/24 hr oral tablet, extended release: 1 tab(s) orally once a day  oxyCODONE 10 mg oral tablet: 1 tab(s) orally 2 times a day  pantoprazole 40 mg oral delayed release tablet: 1 tab(s) orally once a day (before a meal)  polyethylene glycol 3350 oral powder for reconstitution: 17 gram(s) orally 2 times a day  Potassium Chloride (Eqv-Klor-Con M20) 20 mEq oral tablet, extended release: 1 tab(s) orally 2 times a day  Pyridium 100 mg oral tablet: 1 tab(s) orally 3 times a day as needed  saccharomyces boulardii lyo 250 mg oral capsule: 1 cap(s) orally 2 times a day  senna (sennosides) 8.6 mg oral tablet: 2 tab(s) orally once a day (at bedtime)  sodium chloride 0.65% nasal spray: 1 spray(s) in each nostril once a day  sucralfate 1 g oral tablet: 1 tab(s) orally once a day  tiotropium 2.5 mcg/inh inhalation aerosol: 2 puff(s) inhaled once a day  triamcinolone 0.1% topical cream: Apply topically to affected area 2 times a day  Vitamin C 1000 mg oral tablet: 1 tab(s) orally once a day  Xyzal 5 mg oral tablet: 1 tab(s) orally once a day (at bedtime)

## 2024-03-29 NOTE — PROGRESS NOTE ADULT - PROBLEM SELECTOR PLAN 4
On metoprolol
On metoprolol
2/2 acute on chronic diastolic HF (HFpEF)  On iv lasix 40mg q12h, change to po bid in AM  Nebs  Oxygen prn  Pulmonary/cardio f/u  Further work-up/management pending clinical course.
2/2 acute on chronic diastolic HF (HFpEF)  Now on lasix 40mg po bid  Nebs  Oxygen prn  Pulmonary/cardio f/u  Further work-up/management pending clinical course.
On metoprolol
2/2 acute on chronic diastolic HF (HFpEF)  Now on lasix 40mg po bid and iv lasix prn  Nebs  Oxygen prn  Pulmonary/cardio f/u  Further work-up/management pending clinical course.

## 2024-03-29 NOTE — PROGRESS NOTE ADULT - ASSESSMENT
53M with PMH of AF on Eliquis, indwelling Aguilera CAD s/p stents, CVA, DM, HTN, osteomyelitis s/p debridement, PE, sent from [facility] for elevated BP, SOB, and dislodged Aguilera, found to be febrile in ER, a/w sepsis and A fib RVR.     Sepsis, Afib RVR, CAD, HTN  - s/p Dig load and Cardizem gtt.  Dig level = 1.5.  Keep off Dig, not a home med.   - Had a 3.4 sec pause, 3/22.  BB D/C'd.  Had 3 sec pause on 3/26.   - Tele Afib, no events overnight  - Continue Cardizem  mg daily  - s/p bone bx. tolerated well. Dc tele  - Continue Eliquis     - BNP 7622.CxR, 3/23, showed mild Lt and trace Rt pleural effusions  - Now appears euvolemic, tolerating RA  - Continue PO Lasix 40mg BID  - 3/18/24 Technically difficult ECHO w/ normal LV size and function EF 50-55%, mildly reduced RV systolic function, mod dilated LA and RA, mod to sev MR, mild to mod TR, PASP 36  - Hypernatremia resolved    - EKG: A fib RVR @ 154  - Troponin: negative   - No evidence of any active ischemia   - Continue aspirin and statin     - BP stable and controlled.  No further hypotensive episode  - Continue to hold home Imdur and Losartan to allow room for AV nodals     - Monitor and replete lytes, keep K>4, Mg>2.  - Will continue to follow.    Nery Alex NP  Nurse Practitioner- Cardiology   Call TEAMS

## 2024-03-29 NOTE — PROGRESS NOTE ADULT - PROBLEM SELECTOR PROBLEM 3
Unstageable pressure ulcer of right heel
Dyspnea
Unstageable pressure ulcer of right heel
Unstageable pressure ulcer of right heel
Dyspnea
Unstageable pressure ulcer of right heel
Dyspnea

## 2024-03-29 NOTE — PROGRESS NOTE ADULT - PROBLEM SELECTOR PROBLEM 1
Diabetes mellitus, type 2
Sepsis, unspecified organism
Sepsis, unspecified organism
Diabetes mellitus, type 2
Unstageable pressure ulcer of right heel
Unstageable pressure ulcer of right heel
Diabetes mellitus, type 2
Sepsis, unspecified organism
Diabetes mellitus, type 2
Sepsis, unspecified organism

## 2024-03-29 NOTE — PROGRESS NOTE ADULT - SUBJECTIVE AND OBJECTIVE BOX
PROGRESS NOTE   Patient is a 53y old  Male who presents with a chief complaint of fever (29 Mar 2024 11:59)  s/p right heel wound debridement and bone biopsy. Patient relates no overnight events and states that they are doing well at this time.  Denies any fever, chills, nausea, vomiting, chest pain, shortness of breath, or calf pain at this time.          HPI:  This is a 53M with PMH of AF on Eliquis, indwelling jacques, CAD s/p stents, CVA, DM, HTN, osteomyelitis s/p debridement, PE, sent from [facility] for elevated BP, SOB, and dislodged jacques. Patient states he was having difficulty breathing yesterday and that he couldn't catch his breath. Denies feeling feverish, cp, abd pain, vomiting, diarrhea. He was found to be febrile to 103.8, lactate 3.1 --> 3.7. He was also found to be in DEIDRE. He received iv cardizem 10mg ivp x 1 and then was started on iv cardizem 10mg/hr gtt. It was dropped to 5mg/hr overnight due to hypotension. The patient was digoxin loaded due to hypotension and persistent AF with RVR in 140s-150s. (17 Mar 2024 06:22)      Vital Signs Last 24 Hrs  T(C): 36.7 (29 Mar 2024 11:57), Max: 36.7 (29 Mar 2024 05:10)  T(F): 98.1 (29 Mar 2024 11:57), Max: 98.1 (29 Mar 2024 11:57)  HR: 95 (29 Mar 2024 11:57) (87 - 95)  BP: 119/77 (29 Mar 2024 11:57) (103/63 - 123/69)  BP(mean): --  RR: 18 (29 Mar 2024 11:57) (18 - 18)  SpO2: 93% (29 Mar 2024 11:57) (93% - 95%)    Parameters below as of 29 Mar 2024 11:57  Patient On (Oxygen Delivery Method): room air                              12.4   13.17 )-----------( 325      ( 29 Mar 2024 09:35 )             37.4               03-29    142  |  105  |  17  ----------------------------<  290<H>  3.4<L>   |  30  |  1.10    Ca    8.3<L>      29 Mar 2024 09:35  Mg     1.6     03-29        PHYSICAL EXAM  Vascular: Weakly palpable pulses bilateral   Neurologic: IDDM with neuropathy   Muskculoskeletal: bilateral cavus foot deformity   Dermatological: Incision site of the right heel healthy granulating tissue, mild winston-incision erythema, no proximal streaking, no fluctuance, no malodor, no signs of infection at this time.    Specimen(s) Submitted  1  Right heel tissue, for pathology  2  Right heel bone, for pathology    Final Diagnosis  1. Right heel tissue:  Fibroadipose tissue with inflammation and necrosis      2. Right heel bone:  Bone tissue with reactive changes, few plasma cells and marrow fibrosis  present compatible with minimal chronic osteomyelitis  Verified by: Shaquille Coronado MD

## 2024-03-29 NOTE — PROGRESS NOTE ADULT - PROBLEM SELECTOR PROBLEM 6
Diabetes mellitus, type 2

## 2024-03-29 NOTE — PROGRESS NOTE ADULT - SUBJECTIVE AND OBJECTIVE BOX
Date of Service: 03-29-24 @ 12:00    Patient is a 53y old  Male who presents with a chief complaint of fever (28 Mar 2024 12:44)      INTERVAL HPI/OVERNIGHT EVENTS: Patient seen and examined. NAD. No complaints.    Vital Signs Last 24 Hrs  T(C): 36.7 (29 Mar 2024 11:57), Max: 36.8 (28 Mar 2024 13:05)  T(F): 98.1 (29 Mar 2024 11:57), Max: 98.2 (28 Mar 2024 13:05)  HR: 95 (29 Mar 2024 11:57) (85 - 95)  BP: 119/77 (29 Mar 2024 11:57) (103/63 - 123/69)  BP(mean): --  RR: 18 (29 Mar 2024 11:57) (17 - 18)  SpO2: 93% (29 Mar 2024 11:57) (93% - 95%)    Parameters below as of 29 Mar 2024 11:57  Patient On (Oxygen Delivery Method): room air        03-29    142  |  105  |  17  ----------------------------<  290<H>  3.4<L>   |  30  |  1.10    Ca    8.3<L>      29 Mar 2024 09:35  Mg     1.6     03-29                            12.4   13.17 )-----------( 325      ( 29 Mar 2024 09:35 )             37.4       CAPILLARY BLOOD GLUCOSE      POCT Blood Glucose.: 232 mg/dL (29 Mar 2024 08:05)  POCT Blood Glucose.: 283 mg/dL (28 Mar 2024 21:53)  POCT Blood Glucose.: 306 mg/dL (28 Mar 2024 17:14)  POCT Blood Glucose.: 307 mg/dL (28 Mar 2024 12:02)    Urinalysis Basic - ( 29 Mar 2024 09:35 )    Color: x / Appearance: x / SG: x / pH: x  Gluc: 290 mg/dL / Ketone: x  / Bili: x / Urobili: x   Blood: x / Protein: x / Nitrite: x   Leuk Esterase: x / RBC: x / WBC x   Sq Epi: x / Non Sq Epi: x / Bacteria: x              acetaminophen     Tablet .. 650 milliGRAM(s) Oral every 6 hours PRN  aluminum hydroxide/magnesium hydroxide/simethicone Suspension 30 milliLiter(s) Oral every 4 hours PRN  amoxicillin 1000 milliGRAM(s) Oral three times a day  apixaban 5 milliGRAM(s) Oral every 12 hours  ascorbic acid 500 milliGRAM(s) Oral daily  aspirin  chewable 81 milliGRAM(s) Oral daily  atorvastatin 40 milliGRAM(s) Oral at bedtime  bisacodyl 10 milliGRAM(s) Oral daily  ceFAZolin   IVPB 2000 milliGRAM(s) IV Intermittent every 8 hours  cholecalciferol 1000 Unit(s) Oral daily  dextrose 5%. 1000 milliLiter(s) IV Continuous <Continuous>  dextrose 5%. 1000 milliLiter(s) IV Continuous <Continuous>  dextrose 50% Injectable 12.5 Gram(s) IV Push once  dextrose 50% Injectable 25 Gram(s) IV Push once  dextrose Oral Gel 15 Gram(s) Oral once PRN  diltiazem    milliGRAM(s) Oral daily  ferrous    sulfate 325 milliGRAM(s) Oral two times a day  furosemide    Tablet 40 milliGRAM(s) Oral two times a day  gabapentin 300 milliGRAM(s) Oral every 12 hours  glucagon  Injectable 1 milliGRAM(s) IntraMuscular once  insulin glargine Injectable (LANTUS) 10 Unit(s) SubCutaneous at bedtime  insulin lispro (ADMELOG) corrective regimen sliding scale   SubCutaneous at bedtime  insulin lispro (ADMELOG) corrective regimen sliding scale   SubCutaneous three times a day before meals  insulin lispro Injectable (ADMELOG) 5 Unit(s) SubCutaneous three times a day before meals  loratadine 10 milliGRAM(s) Oral daily  melatonin 5 milliGRAM(s) Oral at bedtime  metoclopramide 5 milliGRAM(s) Oral three times a day  multivitamin/minerals 1 Tablet(s) Oral daily  naloxegol 25 milliGRAM(s) Oral daily  ondansetron Injectable 4 milliGRAM(s) IV Push every 6 hours PRN  oxybutynin 5 milliGRAM(s) Oral two times a day  oxyCODONE    IR 10 milliGRAM(s) Oral two times a day  pantoprazole    Tablet 40 milliGRAM(s) Oral before breakfast  polyethylene glycol 3350 17 Gram(s) Oral two times a day  potassium chloride    Tablet ER 40 milliEquivalent(s) Oral once  predniSONE   Tablet 20 milliGRAM(s) Oral daily  saccharomyces boulardii 250 milliGRAM(s) Oral two times a day  senna 2 Tablet(s) Oral at bedtime  sodium chloride 0.65% Nasal 1 Spray(s) Both Nostrils daily  sucralfate 1 Gram(s) Oral two times a day  tiotropium 2.5 MICROgram(s) Inhaler 2 Puff(s) Inhalation daily              REVIEW OF SYSTEMS:  CONSTITUTIONAL: No fever, no weight loss, or no fatigue  NECK: No pain, no stiffness  RESPIRATORY: No cough, no wheezing, no chills, no hemoptysis, No shortness of breath  CARDIOVASCULAR: No chest pain, no palpitations, no dizziness, no leg swelling  GASTROINTESTINAL: No abdominal pain. No nausea, no vomiting, no hematemesis; No diarrhea, no constipation. No melena, no hematochezia.  GENITOURINARY: No dysuria, no frequency, no hematuria, no incontinence  NEUROLOGICAL: No headaches, no loss of strength, no numbness, no tremors  SKIN: No itching, no burning  MUSCULOSKELETAL: No joint pain, no swelling; No muscle, no back, no extremity pain  PSYCHIATRIC: No depression, no mood swings,   HEME/LYMPH: No easy bruising, no bleeding gums  ALLERY AND IMMUNOLOGIC: No hives       Consultant(s) Notes Reviewed:  [X] YES  [ ] NO    PHYSICAL EXAM:  GENERAL: NAD  HEAD:  Atraumatic, Normocephalic  EYES: EOMI, PERRLA, conjunctiva and sclera clear  ENMT: No tonsillar erythema, exudates, or enlargement; Moist mucous membranes  NECK: Supple, No JVD  NERVOUS SYSTEM:  Awake & alert  CHEST/LUNG: Clear to auscultation bilaterally; No rales, rhonchi, wheezing,  HEART: Regular rate and rhythm  ABDOMEN: Soft, Nontender, Nondistended; Bowel sounds present  EXTREMITIES:  No clubbing, cyanosis, or edema  LYMPH: No lymphadenopathy noted  SKIN: No rashes      Advanced care planning discussed with patient/family [X] YES   [ ] NO    Advanced care planning discussed with patient/family. Patient's health status was discussed. All appropriate changes have been made regarding patient's end-of-life care. Advanced care planning forms reviewed/discussed/completed.  20 minutes spent.

## 2024-03-29 NOTE — PROGRESS NOTE ADULT - PROBLEM SELECTOR PLAN 6
RISS  Endocrine f/u

## 2024-03-29 NOTE — PROGRESS NOTE ADULT - PROBLEM SELECTOR PLAN 7
Patient reports that rash has been there for months and is due to f/u dermatology as outpatient  Possibly contact dermatitis -- slightly improved  Trial of steroids (prednisone 20mg qd x 3days)
Patient reports that rash has been there for months and is due to f/u dermatology as outpatient  Possibly contact dermatitis  Trial of steroids (prednisone 20mg qd x 3days)
Patient reports that rash has been there for months and is due to f/u dermatology as outpatient  Possibly contact dermatitis -- slightly improved  Trial of steroids (prednisone 20mg qd x 3days)

## 2024-03-29 NOTE — PROGRESS NOTE ADULT - PROBLEM SELECTOR PLAN 2
Monitor on tele  On po cardizem 90mg q6h  S/P digoxin load -- on daily digoxin  Continue metoprolol 25mg bid  Continue Eliquis  Cardio f/u  Further work-up/management pending clinical course.
Now rate controlled  Monitor on tele  On po cardizem CD 360mg qd  Off digoxin and metoprolol due to 3.4 sec pause on tele  Continue Eliquis -- hold for surgery  Cardio f/u  Further work-up/management pending clinical course.
Monitor on tele  Continue iv cardizem gtt and then transition to po cardizem  S/P digoxin load -- on daily digoxin  Continue Eliquis  Cardio f/u  Further work-up/management pending clinical course.
Monitor on tele  On po cardizem 90mg q6h  S/P digoxin load -- on daily digoxin  Continue metoprolol 25mg bid  Continue Eliquis  Cardio f/u  Further work-up/management pending clinical course.
Now rate controlled  Monitor on tele  On po cardizem CD 300mg qd  Off digoxin and metoprolol due to 3.4 sec pause on tele  Continue Eliquis  Cardio f/u  Further work-up/management pending clinical course.
Monitor on tele  Transitioned to po cardizem 90mg q6h  S/P digoxin load -- on daily digoxin  Continue metoprolol 25mg bid  Continue Eliquis  Cardio f/u  Further work-up/management pending clinical course.
Now rate controlled  Monitor on tele  On po cardizem CD 360mg qd  Off digoxin and metoprolol due to 3.4 sec pause on tele  Continue Eliquis -- hold for surgery  Cardio f/u  Further work-up/management pending clinical course.
Monitor on tele  On po cardizem 90mg q6h  S/P digoxin load -- on daily digoxin  Continue metoprolol 25mg bid  Continue Eliquis  Cardio f/u  Further work-up/management pending clinical course.
Now rate controlled  Monitor on tele  On po cardizem CD 300mg qd  Off digoxin and metoprolol due to 3.4 sec pause on tele  Continue Eliquis  Cardio f/u  Further work-up/management pending clinical course.
Now rate controlled  Monitor on tele  On po cardizem CD 360mg qd  Off digoxin and metoprolol due to 3.4 sec pause on tele  Resume Eliquis  Cardio f/u  Further work-up/management pending clinical course.

## 2024-03-29 NOTE — DISCHARGE NOTE PROVIDER - PROVIDER TOKENS
PROVIDER:[TOKEN:[[id number]],FOLLOWUP:[1 week],ESTABLISHEDPATIENT:[T]],PROVIDER:[TOKEN:[[id number]],FOLLOWUP:[1-3 days],ESTABLISHEDPATIENT:[T]]

## 2024-03-29 NOTE — PROGRESS NOTE ADULT - PROBLEM SELECTOR PROBLEM 2
Atrial fibrillation with rapid ventricular response

## 2024-03-29 NOTE — PROGRESS NOTE ADULT - SUBJECTIVE AND OBJECTIVE BOX
[facility] -- Bolivar Carbajal,  Brittany, Reynaldo Sweeney Savella, Goodger, Cohen  Office # [phone]    Follow Up:  Afib    Subjective/Observations: No events overnight resting comfortably in bed.  No complaints of chest pain, dyspnea, or palpitations reported. No signs of orthopnea or PND.      REVIEW OF SYSTEMS: All other review of systems is negative unless indicated above  PAST MEDICAL & SURGICAL HISTORY:  Diabetes      Diabetes mellitus with no complication      Afib      Hypertension      BPH (benign prostatic hyperplasia)      Perforated gastric ulcer  s/p emergent ex-lap omentopexy and plication 6/2019      Pulmonary embolism      History of non-ST elevation myocardial infarction (NSTEMI)      Osteomyelitis  s/p debridement      CAD S/P percutaneous coronary angioplasty      Cerebrovascular accident      H/O abdominal surgery      Perforated gastric ulcer      Traumatic amputation of left foot, initial encounter        MEDICATIONS  (STANDING):  amoxicillin 1000 milliGRAM(s) Oral three times a day  apixaban 5 milliGRAM(s) Oral every 12 hours  ascorbic acid 500 milliGRAM(s) Oral daily  aspirin  chewable 81 milliGRAM(s) Oral daily  atorvastatin 40 milliGRAM(s) Oral at bedtime  bisacodyl 10 milliGRAM(s) Oral daily  ceFAZolin   IVPB 2000 milliGRAM(s) IV Intermittent every 8 hours  cholecalciferol 1000 Unit(s) Oral daily  dextrose 5%. 1000 milliLiter(s) (100 mL/Hr) IV Continuous <Continuous>  dextrose 5%. 1000 milliLiter(s) (50 mL/Hr) IV Continuous <Continuous>  dextrose 50% Injectable 25 Gram(s) IV Push once  dextrose 50% Injectable 12.5 Gram(s) IV Push once  diltiazem    milliGRAM(s) Oral daily  ferrous    sulfate 325 milliGRAM(s) Oral two times a day  furosemide    Tablet 40 milliGRAM(s) Oral two times a day  gabapentin 300 milliGRAM(s) Oral every 12 hours  glucagon  Injectable 1 milliGRAM(s) IntraMuscular once  insulin glargine Injectable (LANTUS) 10 Unit(s) SubCutaneous at bedtime  insulin lispro (ADMELOG) corrective regimen sliding scale   SubCutaneous three times a day before meals  insulin lispro (ADMELOG) corrective regimen sliding scale   SubCutaneous at bedtime  insulin lispro Injectable (ADMELOG) 5 Unit(s) SubCutaneous three times a day before meals  loratadine 10 milliGRAM(s) Oral daily  melatonin 5 milliGRAM(s) Oral at bedtime  metoclopramide 5 milliGRAM(s) Oral three times a day  multivitamin/minerals 1 Tablet(s) Oral daily  naloxegol 25 milliGRAM(s) Oral daily  oxybutynin 5 milliGRAM(s) Oral two times a day  oxyCODONE    IR 10 milliGRAM(s) Oral two times a day  pantoprazole    Tablet 40 milliGRAM(s) Oral before breakfast  polyethylene glycol 3350 17 Gram(s) Oral two times a day  predniSONE   Tablet 20 milliGRAM(s) Oral daily  saccharomyces boulardii 250 milliGRAM(s) Oral two times a day  senna 2 Tablet(s) Oral at bedtime  sodium chloride 0.65% Nasal 1 Spray(s) Both Nostrils daily  sucralfate 1 Gram(s) Oral two times a day  tiotropium 2.5 MICROgram(s) Inhaler 2 Puff(s) Inhalation daily    MEDICATIONS  (PRN):  acetaminophen     Tablet .. 650 milliGRAM(s) Oral every 6 hours PRN Temp greater or equal to 38C (100.4F), Mild Pain (1 - 3)  aluminum hydroxide/magnesium hydroxide/simethicone Suspension 30 milliLiter(s) Oral every 4 hours PRN Dyspepsia  dextrose Oral Gel 15 Gram(s) Oral once PRN Blood Glucose LESS THAN 70 milliGRAM(s)/deciliter  ondansetron Injectable 4 milliGRAM(s) IV Push every 6 hours PRN Nausea and/or Vomiting    Allergies    No Known Drug Allergies  fish (Hives)    Intolerances      Vital Signs Last 24 Hrs  T(C): 36.7 (29 Mar 2024 05:10), Max: 36.8 (28 Mar 2024 13:05)  T(F): 98 (29 Mar 2024 05:10), Max: 98.2 (28 Mar 2024 13:05)  HR: 88 (29 Mar 2024 05:10) (85 - 88)  BP: 103/63 (29 Mar 2024 05:10) (103/63 - 123/69)  BP(mean): --  RR: 18 (29 Mar 2024 05:10) (17 - 18)  SpO2: 93% (29 Mar 2024 05:10) (93% - 95%)    Parameters below as of 29 Mar 2024 05:10  Patient On (Oxygen Delivery Method): room air      I&O's Summary    28 Mar 2024 07:01  -  29 Mar 2024 07:00  --------------------------------------------------------  IN: 50 mL / OUT: 2900 mL / NET: -2850 mL        TELE: Afib  PHYSICAL EXAM:  Constitutional: NAD, awake and alert  HEENT: Moist Mucous Membranes, Anicteric  Pulmonary: Non-labored, breath sounds are clear bilaterally, No wheezing, crackles or rhonchi  Cardiovascular: IRRegular, S1 and S2 nl, murmur   Gastrointestinal: Bowel Sounds present, soft, nontender.   Lymph: No lymphadenopathy. No peripheral edema.  Skin: No visible rashes or ulcers. right foot dressing   Psych:  Mood & affect appropriate    LABS: All Labs Reviewed:                        12.4   11.26 )-----------( 298      ( 28 Mar 2024 06:34 )             37.4                         13.2   11.88 )-----------( 327      ( 27 Mar 2024 06:12 )             41.0     28 Mar 2024 06:34    141    |  101    |  18     ----------------------------<  307    4.7     |  32     |  1.10   27 Mar 2024 06:12    143    |  104    |  17     ----------------------------<  139    3.4     |  29     |  1.00     Ca    8.3        28 Mar 2024 06:34  Ca    8.5        27 Mar 2024 06:12  Mg     1.9       28 Mar 2024 06:34  Mg     1.9       27 Mar 2024 06:12            12 Lead ECG:   Ventricular Rate 154 BPM    QRS Duration 76 ms    Q-T Interval 282 ms    QTC Calculation(Bazett) 451 ms    R Axis 15 degrees    T Axis 194 degrees    Diagnosis Line *** Critical Test Result: High HR  Atrial fibrillation with rapid ventricular response  Low voltage QRS  Nonspecific ST and T wave abnormality    Confirmed by JOHN SWEENEY ([id number]) on 3/17/2024 7:30:13 AM (03-16-24 @ 23:50)       EXAM:  ECHO TTE WO CON COMP W DOPPLR         PROCEDURE DATE:  12/24/2019        INTERPRETATION:  INDICATION: Coronary artery disease    Blood Pressure 117/71    Height 187     Weight 93       BSA 2.2    Dimensions:    LA 4.2       Normal Values: 2.0 - 4.0 cm    Ao 4.0        Normal Values: 2.0 - 3.8 cm  SEPTUM 1.6       Normal Values: 0.6 - 1.2 cm  PWT 1.6       Normal Values: 0.6 - 1.1 cm  LVIDd 5.8         Normal Values: 3.0 - 5.6 cm  LVIDs 3.2         Normal Values: 1.8 - 4.0 cm    Derived Variables:  LVMI g/m2  RWT  Fractional Short  Ejection Fraction    Doppler Peak v. AoV= (m/sec)    OBSERVATIONS:    Mitral Valve: normal, mild MR.  Aortic Valve/Aorta: normal trileaflet aortic valve.  Tricuspid Valve: normal with trace TR.  Pulmonic Valve: normal  Left Atrium: Enlarged  Right Atrium: normal  Left Ventricle: Severe concentric LVH with normal systolic function, estimated LVEF of 65%.  Right Ventricle: normal size and systolic function.  Pericardium/Pleura: no significant pleural effusion, no significant pericardial effusion.  Pulmonary/RV Pressure: Inadequate TR jet to estimate PA systolic pressure  LV Diastolic Function: Grade 2diastolic dysfunction.    Severe concentric LVH, and mild LV enlargement, with normal systolic function, estimated LVEF of 65%. Normal right ventricular size and systolic function. Grade 2diastolic dysfunction. Left atrial enlargement The aortic root is normal in size. The mitral valve is structurally normal, mild MR. The aortic valve is trileaflet without stenosis or insufficiency. Trace physiologic TR. Inadequate TR jet to estimate PA systolic pressure No significant pericardial effusion.                  JOVANNI ALVAREZ M.D., ATTENDING CARDIOLOGIST  This document has been electronically signed. Dec 25 2019 11:45AM

## 2024-03-29 NOTE — PROGRESS NOTE ADULT - PROBLEM SELECTOR PROBLEM 5
Essential hypertension
Essential hypertension
Diabetes mellitus, type 2
Diabetes mellitus, type 2
Essential hypertension
Diabetes mellitus, type 2
Essential hypertension

## 2024-03-29 NOTE — DISCHARGE NOTE PROVIDER - NSDCCPCAREPLAN_GEN_ALL_CORE_FT
PRINCIPAL DISCHARGE DIAGNOSIS  Diagnosis: Sepsis  Assessment and Plan of Treatment: Finish course of antibiotics.        SECONDARY DISCHARGE DIAGNOSES  Diagnosis: Unstageable pressure ulcer of right heel  Assessment and Plan of Treatment: Finish course of antibiotics.  Follow-up with wound care center next week    Diagnosis: Acute UTI  Assessment and Plan of Treatment: Finish course of antibiotics.      Diagnosis: Rapid atrial fibrillation  Assessment and Plan of Treatment: Continue current medications

## 2024-03-29 NOTE — CASE MANAGEMENT PROGRESS NOTE - NSCMPROGRESSNOTE_GEN_ALL_CORE
Discussed in interdisciplinary rounds.  Patient is POD 3 right heel debridement with pathology pending.  Placed on Ancef yesterday.  Anticipate return to [facility] when ready.  WIll remain available.

## 2024-03-29 NOTE — PROGRESS NOTE ADULT - PROBLEM SELECTOR PLAN 3
S/P selective debridement of R heal with bone bx -- POD #3  Continue post-op care  pathology with no acute osteo  Right heel bone: Bone tissue with reactive changes, few plasma cells and marrow fibrosis, present compatible with minimal chronic -   only chronic so as discussed with podiatry fine for dc on  Amoxicillin 1 gram PO TID with last day 4/3 and  Keflex 500mg PO TID with last day 4/3  Podiatry/ID f/u

## 2024-03-29 NOTE — DISCHARGE NOTE PROVIDER - HOSPITAL COURSE
MEDICATION PRIOR AUTHORIZATION NEEDED:    1. Name of Medication: Spiriva Handihaler 18 mcg    2. Requested By (Name of Pharmacy): [facility]     3. Is insurance on file current? yes    4. What is the name & phone number of the 3rd party payor? [facility] [phone]   This is a 53M with PMH of AF on Eliquis, indwelling jacques, CAD s/p stents, CVA, DM, HTN, osteomyelitis s/p debridement, PE, sent from [facility] for elevated BP, SOB, and dislodged jacques. Patient states he was having difficulty breathing yesterday and that he couldn't catch his breath. Denies feeling feverish, cp, abd pain, vomiting, diarrhea. He was found to be febrile to 103.8, lactate 3.1 --> 3.7. He was also found to be in DEIDRE. He received iv cardizem 10mg ivp x 1 and then was started on iv cardizem 10mg/hr gtt. It was dropped to 5mg/hr overnight due to hypotension. The patient was digoxin loaded due to hypotension and persistent AF with RVR in 140s-150s.     Problem/Plan - 1:  ·  Problem: Sepsis, unspecified organism.   ·  Plan: Septic shock likely 2/2 to CAUTI (jacques changed in ER)  + ESBL bacteremia  S/P ertapenem until 3/26   F/U culture data and repeat cultures -- NTD  ID f/u       Problem/Plan - 2:  ·  Problem: Atrial fibrillation with rapid ventricular response.   ·  Plan: Now rate controlled  Monitor on tele  On po cardizem CD 360mg qd  Off digoxin and metoprolol due to 3.4 sec pause on tele  Resume Eliquis       Problem/Plan - 3:  ·  Problem: Unstageable pressure ulcer of right heel.   ·  Plan: S/P selective debridement of R heal with bone bx --   Continue post-op care  Right heel bone: Bone tissue with reactive changes, few plasma cells and marrow fibrosis, present compatible with minimal chronic -   only chronic so as discussed with podiatry fine for dc on  Amoxicillin 1 gram PO TID with last day 4/3 and  Keflex 500mg PO TID with last day 4/3       Problem/Plan - 4:  ·  Problem: Dyspnea.   ·  Plan: 2/2 acute on chronic diastolic HF (HFpEF)  Now on lasix 40mg po bid and iv lasix prn  Nebs  Oxygen prn    >35 minutes spent on discharge This is a 53M with PMH of AF on Eliquis, indwelling jacques, CAD s/p stents, CVA, DM, HTN, osteomyelitis s/p debridement, PE, sent from [facility] for elevated BP, SOB, and dislodged jacques. Patient states he was having difficulty breathing yesterday and that he couldn't catch his breath. Denies feeling feverish, cp, abd pain, vomiting, diarrhea. He was found to be febrile to 103.8, lactate 3.1 --> 3.7. He was also found to be in DEIDRE. He received iv cardizem 10mg ivp x 1 and then was started on iv cardizem 10mg/hr gtt. It was dropped to 5mg/hr overnight due to hypotension. The patient was digoxin loaded due to hypotension and persistent AF with RVR in 140s-150s.     Problem/Plan - 1:  ·  Problem: Sepsis, unspecified organism.   ·  Plan: Septic shock likely 2/2 to CAUTI (jacques changed in ER)  + ESBL bacteremia  S/P ertapenem until 3/26   F/U culture data and repeat cultures -- NTD  ID f/u       Problem/Plan - 2:  ·  Problem: Atrial fibrillation with rapid ventricular response.   ·  Plan: Now rate controlled  Monitor on tele  On po cardizem CD 360mg qd  Off digoxin and metoprolol due to 3.4 sec pause on tele  Resume Eliquis       Problem/Plan - 3:  ·  Problem: Unstageable pressure ulcer of right heel.   ·  Plan: S/P selective debridement of R heal with bone bx --   Continue post-op care  Cytopath no osteo  Right heel bone: Bone tissue with reactive changes, few plasma cells and marrow fibrosis, present compatible with minimal chronic -   only chronic so as discussed with podiatry fine for dc on  Amoxicillin 1 gram PO TID with last day 4/3 and  Keflex 500mg PO TID with last day 4/3       Problem/Plan - 4:  ·  Problem: Dyspnea.   ·  Plan: 2/2 acute on chronic diastolic HF (HFpEF)  Now on lasix 40mg po bid and iv lasix prn  Nebs  Oxygen prn    >35 minutes spent on discharge

## 2024-03-29 NOTE — PROGRESS NOTE ADULT - ASSESSMENT
52 y/o M PMhx AF on Eliquis, chronic jacques, CAD s/p stents, CVA, DM, HTN, PE who presented w/ SOB, and dislodged jacques as well as dysuria.  also found to have Afib w/ RVR  admitted with  concerns for sepsis, CAUTI, dislodged jacques  reported dysuria  jacques placed in ED  CT abd/pelvis- No hydronephrosis or nephrolithiasis.    Recommendations  1-Bacteremia ESBL E coli (suspected pyelonephritis)   -  Escherichia coli: Detec   -  ESBL: Detec   -  CTX-M Resistance Marker: Detec  Culture - Urine (03.16.24 @ 22:35)  >100,000 CFU/ml Escherichia coli ESBL  Repeat blood cultures collected 3/18 5am - NGTD past 48 hours  sp-ertapenem started 3/17 with last day 3/26    2-Foot wound open area on pressure location, now dressed, podiatry involved for this unstageable ulcer with rec  Xray-no evidence of osteo  MRI-not done with aneurysm clips so biopsy 3/26  Path-PENDING but expected Friday  Micro-Few Staphylococcus aureus, Moderate Enterococcus faecalis  MRSA/MSSA PCR (03.21.24 @ 10:00)    MRSA PCR Result.: NotDetec:    Staph aureus PCR Result:   3/28 started cefazolin (MSSA)/amoxicillin (E faecalis)   pathology with no acute osteo  Right heel bone: Bone tissue with reactive changes, few plasma cells and marrow fibrosis, present compatible with minimal chronic -   only chronic so as discussed with podiatry fine for dc on  Amoxicillin 1 gram PO TID with last day 4/3 and  Keflex 500mg PO TID with last day 4/3    3-Rash chronic diffuse rash present for months and likely related to contact dermatitis from detergent    From an ID standpoint no further requirement for inpatient status for the management of ID issues. Fine with discharge from ID standpoint when other medical issues no longer require inpatient care and social issues allow for a safe discharge plan. To schedule an outpatient ID follow up appointment please call our office at [phone]    Thank you for consulting us and involving us in the management of this most interesting and challenging case.  Please call us at [phone] or text me directly on my cell#[phone] with any concerns or further questions.

## 2024-03-29 NOTE — SOCIAL WORK PROGRESS NOTE - NSSWPROGRESSNOTE_GEN_ALL_CORE
Per MD, pt is cleared for dc today. Pt to be transferred to [facility] today at 1:30pm via [facility] ambulance. Pt was made aware of dc/transport for today and is in agreement. SW will remain available as needed.

## 2024-03-29 NOTE — PROGRESS NOTE ADULT - NS ATTEND AMEND GEN_ALL_CORE FT
53M with PMH of AF on Eliquis, indwelling Aguilera CAD s/p stents, CVA, DM, HTN, osteomyelitis s/p debridement, PE, sent from [facility] for elevated BP, SOB, and dislodged Aguilera, found to be febrile in ER, a/w sepsis and A fib RVR.     known af on ac  elevated hr in the setting of febrile illness/sepsis  dig loaded  on dilt at 10, and can increased to 15/h if needed pending hr and bp trends  cont ac  TTE (12/2023) normal LV & RV size and function EF 55-60%, indeterminate DD, mod dilated LA and RA, mod MR, mild TR, PASP 31  mild edema, can cont lasix    known cad without acute ischemia  cont asa, statin
53M with PMH of AF on Eliquis, indwelling Aguilera CAD s/p stents, CVA, DM, HTN, osteomyelitis s/p debridement, PE, sent from [facility] for elevated BP, SOB, and dislodged Aguilera, found to be febrile in ER, a/w sepsis and A fib RVR.     - Known A fib, now -150s on admission   - Telemetry reviewed AF with stable rates but with 3.4 second pause on 3/22 in the PM  - Digoxin stopped.   - Would stop Metoprolol, already on PO Cardizem 300mg.   - Continue Eliquis for AC, admits compliance  - tte with normal LV function.   - Still with crackles on his exam and O2 requirement  - Remains on 2L NC, s/p IV lasix on 3/22. Does not appear volume up, would repeat a CXR today.   - Please continue to maintain strict I/Os, monitor daily weights, Cr, and K.   - Continue aspirin and statin for known CAD
53M with PMH of AF on Eliquis, indwelling Aguilera CAD s/p stents, CVA, DM, HTN, osteomyelitis s/p debridement, PE, sent from [facility] for elevated BP, SOB, and dislodged Aguilera, found to be febrile in ER, a/w sepsis and A fib RVR.     - Known A fib, now -150s on admission   - Telemetry reviewed AF with stable rates   - off of cardizem gtt. cont cardizem, switch to long acting  - S/p digoxin loaded ( digoxin 500 mcg IV + 250 mg PO) d/t hypotension and persistent AF with RVR in 140s-150s.   - Would hold on Digoxin as rates are now <110  - Continue Metoprolol 25 mg PO BID with hold parameters, can uptitrate if HR remains uncontrolled    - Continue Eliquis for AC, admits compliance  - tte with normal LV function.   - Still with crackles on his exam and O2 requirement, would give additional IV lasix dosing today (40mg IV x 1)  - Please continue to maintain strict I/Os, monitor daily weights, Cr, and K.     - Continue aspirin and statin
53M with PMH of AF on Eliquis, indwelling Aguilera CAD s/p stents, CVA, DM, HTN, osteomyelitis s/p debridement, PE, sent from [facility] for elevated BP, SOB, and dislodged Aguilera, found to be febrile in ER, a/w sepsis and A fib RVR.     Sepsis, Afib RVR, CAD, HTN  - s/p Dig load and Cardizem gtt.  Dig level = 1.5.  Keep off Dig, not a home med.   - Had a 3.4 sec pause, 3/22.  BB D/C'd.  No further pauses noted.    -continue Cardizem  mg daily  - s/p bone bx. tolerated well  - back on ac     - BNP 7622.CxR, 3/23, showed mild Lt and trace Rt pleural effusions  -Now appears euvolemic, tolerating RA  - Continue PO Lasix 40mg BID  - 3/18/24 Technically difficult ECHO w/ normal LV size and function EF 50-55%, mildly reduced RV systolic function, mod dilated LA and RA, mod to sev MR, mild to mod TR, PASP 36  - Creatinine remains normal.  Hypernatremia resolved  - Monitor and replete electrolytes. Keep K>4.0 and Mg>2.0.     - EKG: A fib RVR @ 154  - Troponin: <-51, no need to trend  - No evidence of any active ischemia   - Continue aspirin and statin     - BP stable and controlled.  No further hypotensive episode  - Continue to hold home Imdur and Losartan.
53M with PMH of AF on Eliquis, indwelling Aguilera CAD s/p stents, CVA, DM, HTN, osteomyelitis s/p debridement, PE, sent from [facility] for elevated BP, SOB, and dislodged Aguilera, found to be febrile in ER, a/w sepsis and A fib RVR.     Sepsis, Afib RVR, CAD, HTN  - s/p Dig load and Cardizem gtt.  Dig level = 1.5.  Keep off Dig, not a home med.   - Had a 3.4 sec pause, 3/22.  BB D/C'd.  No further pauses noted.    -continue Cardizem  mg daily  - Eliquis on HOLD  for R heel debridement and bone biopsy 2/26. resume when able  - Pt has no active ischemia, decompensated heart failure, unstable arrythmia, or severe stenotic valvular disease. Patient is optimized from cardiovascular standpoint to proceed with planned procedure with routine hemodynamic monitoring.     - BNP 7622.CxR, 3/23, showed mild Lt and trace Rt pleural effusions  -Now appears euvolemic, tolerating RA  - Continue PO Lasix 40mg BID  - 3/18/24 Technically difficult ECHO w/ normal LV size and function EF 50-55%, mildly reduced RV systolic function, mod dilated LA and RA, mod to sev MR, mild to mod TR, PASP 36  - Creatinine remains normal.  Hypernatremia resolved    - EKG: A fib RVR @ 154  - Troponin: <-51, no need to trend  - No evidence of any active ischemia   - Continue aspirin and statin     - BP stable and controlled.  No further hypotensive episode  - Continue to hold home Imdur and Losartan to allow room for AV nodals
53M with PMH of AF on Eliquis, indwelling Aguilera CAD s/p stents, CVA, DM, HTN, osteomyelitis s/p debridement, PE, sent from [facility] for elevated BP, SOB, and dislodged Aguilera, found to be febrile in ER, a/w sepsis and A fib RVR.     - Known A fib, now -150s on admission   - Telemetry reviewed AF with stable rates and no sig pauses  - off of cardizem gtt. cont cardizem 90mg Q6H  - S/p digoxin loaded ( digoxin 500 mcg IV + 250 mg PO) d/t hypotension and persistent AF with RVR in 140s-150s.   - Continue digoxin 250 mg PO daily monitor levels intervally  - Continue Metoprolol 25 mg PO BID with hold parameters, can uptitrate if HR remains uncontrolled    - Continue Eliquis for AC, admits compliance  - tte with normal LV function.   - Mild vol ol on examination with LE edema   - Change iv lasix to po bid.  - Please continue to maintain strict I/Os, monitor daily weights, Cr, and K.     - Continue aspirin and statin   - Continue to hold home Imdur and losartan to allow room for AV nodals.
53M with PMH of AF on Eliquis, indwelling Aguilera CAD s/p stents, CVA, DM, HTN, osteomyelitis s/p debridement, PE, sent from [facility] for elevated BP, SOB, and dislodged Aguilera, found to be febrile in ER, a/w sepsis and A fib RVR.     - Known A fib, now -150s on admission   - Telemetry reviewed overnight Afib 70-90s   - On Cardizem, 15 mg/hr, can change to 60 mg Q6H  - S/p digoxin loaded ( digoxin 500 mcg IV + 250 mg PO) d/t hypotension and persistent AF with RVR in 140s-150s.   - Continue digoxin 250 mg PO daily   - Digoxin level: 1.5  - Continue Metoprolol 25 mg PO BID with hold parameters, can uptitrate if HR remains uncontrolled    - Continue Eliquis for AC, admits compliance  - Please obtain transthoracic echocardiogram to assess ventricular function, wall motion and valvular abnormalities  - Mild vol ol on examination with LE edema   - Continue Lasix 40 mg IV BID  - Continue aspirin and statin   - Continue to hold home Imdur and losartan to allow room for AV nodals
53M with PMH of AF on Eliquis, indwelling Aguilera CAD s/p stents, CVA, DM, HTN, osteomyelitis s/p debridement, PE, sent from [facility] for elevated BP, SOB, and dislodged Aguilera, found to be febrile in ER, a/w sepsis and A fib RVR.     - s/p Dig load and Cardizem gtt.  Dig level = 1.5.  Keep off Dig, not a home med.   - Had a 3.4 sec pause, 3/22.  BB D/C'd.  No further pauses noted.  Rates range   - Increase Cardizem CD to 360 mg daily  - Continue Eliquis for AC  - Continue PO Lasix   - Creatinine remains normal.  Hypernatremia resolved  - Continue aspirin and statin   - Continue to hold home Imdur and Losartan to allow room for AV nodals
53M with PMH of AF on Eliquis, indwelling Aguilera CAD s/p stents, CVA, DM, HTN, osteomyelitis s/p debridement, PE, sent from [facility] for elevated BP, SOB, and dislodged Aguilera, found to be febrile in ER, a/w sepsis and A fib RVR.     Sepsis, Afib RVR, CAD, HTN  - s/p Dig load and Cardizem gtt.  Dig level = 1.5.  Keep off Dig, not a home med.   - Had a 3.4 sec pause, 3/22.  BB D/C'd.  No further pauses noted.    -continue Cardizem  mg daily  - s/p bone bx. toelrated well  - back on ac     - BNP 7622.CxR, 3/23, showed mild Lt and trace Rt pleural effusions  -Now appears euvolemic, tolerating RA  - Continue PO Lasix 40mg BID  - 3/18/24 Technically difficult ECHO w/ normal LV size and function EF 50-55%, mildly reduced RV systolic function, mod dilated LA and RA, mod to sev MR, mild to mod TR, PASP 36  - Creatinine remains normal.  Hypernatremia resolved  - Monitor and replete electrolytes. Keep K>4.0 and Mg>2.0.     - EKG: A fib RVR @ 154  - Troponin: <-51, no need to trend  - No evidence of any active ischemia   - Continue aspirin and statin     - BP stable and controlled.  No further hypotensive episode  - Continue to hold home Imdur and Losartan
53M with PMH of AF on Eliquis, indwelling Aguilera CAD s/p stents, CVA, DM, HTN, osteomyelitis s/p debridement, PE, sent from [facility] for elevated BP, SOB, and dislodged Aguilera, found to be febrile in ER, a/w sepsis and A fib RVR.     Sepsis, Afib RVR, CAD, HTN  - s/p Dig load and Cardizem gtt.  Dig level = 1.5.  Keep off Dig, not a home med.   - Had a 3.4 sec pause, 3/22.  BB D/C'd.  Had 3 sec pause on 3/26.   - Tele Afib, no events overnight  - Continue Cardizem  mg daily  - s/p bone bx. tolerated well. Dc tele  - Continue Eliquis     - BNP 7622.CxR, 3/23, showed mild Lt and trace Rt pleural effusions  - Now appears euvolemic, tolerating RA  - Continue PO Lasix 40mg BID  - 3/18/24 Technically difficult ECHO w/ normal LV size and function EF 50-55%, mildly reduced RV systolic function, mod dilated LA and RA, mod to sev MR, mild to mod TR, PASP 36  - Hypernatremia resolved    - EKG: A fib RVR @ 154  - Troponin: negative   - No evidence of any active ischemia   - Continue aspirin and statin     - BP stable and controlled.  No further hypotensive episode  - Continue to hold home Imdur and Losartan to allow room for AV nodals      dc planning for today
53M with PMH of AF on Eliquis, indwelling Aguilera CAD s/p stents, CVA, DM, HTN, osteomyelitis s/p debridement, PE, sent from [facility] for elevated BP, SOB, and dislodged Aguilera, found to be febrile in ER, a/w sepsis and A fib RVR.     - Known A fib, now -150s on admission   - Telemetry reviewed AF with stable rates but with 3.4 second pause on 3/22 in the PM  - Digoxin stopped.   - Would stop Metoprolol, already on PO Cardizem 300mg. No further pauses.   - Continue Eliquis for AC, admits compliance  - tte with normal LV function.   - Still with crackles on his exam and O2 requirement  - Remains on 3L NC, s/p IV lasix on 3/22. Does not appear volume up, repeat CXR pending.   - Please continue to maintain strict I/Os, monitor daily weights, Cr, and K.   - Continue aspirin and statin for known CAD.
53M with PMH of AF on Eliquis, indwelling Aguilera CAD s/p stents, CVA, DM, HTN, osteomyelitis s/p debridement, PE, sent from [facility] for elevated BP, SOB, and dislodged Aguilera, found to be febrile in ER, a/w sepsis and A fib RVR.     - Known A fib, now -150s on admission   - Telemetry reviewed AF with stable rates and no sig pauses  - off of cardizem gtt. cont cardizem 90mg Q6H  - S/p digoxin loaded ( digoxin 500 mcg IV + 250 mg PO) d/t hypotension and persistent AF with RVR in 140s-150s.   - Continue digoxin 250 mg PO daily monitor levels intervally  - Continue Metoprolol 25 mg PO BID with hold parameters, can uptitrate if HR remains uncontrolled    - Continue Eliquis for AC, admits compliance  -  tte with normal LV function.   - Mild vol ol on examination with LE edema   - Continue Lasix 40 mg IV BID for cont vol ol on exam  - Please continue to maintain strict I/Os, monitor daily weights, Cr, and K.     - Continue aspirin and statin   - Continue to hold home Imdur and losartan to allow room for AV nodals

## 2024-03-29 NOTE — PROGRESS NOTE ADULT - PROBLEM SELECTOR PLAN 5
On metoprolol
RISS
On metoprolol
RISS
RISS  Endocrine consult
On metoprolol
On cardizem
On metoprolol
On cardizem
On metoprolol

## 2024-03-29 NOTE — PROGRESS NOTE ADULT - PROBLEM SELECTOR PROBLEM 4
Dyspnea
Essential hypertension
Dyspnea
Essential hypertension
Essential hypertension
Dyspnea

## 2024-03-29 NOTE — PROGRESS NOTE ADULT - SUBJECTIVE AND OBJECTIVE BOX
[facility] DIVISION of INFECTIOUS DISEASE  Juventino Whitten MD PhD, Symone Hickey MD, Anne-Marie Li MD, Jeffery Jacobs MD, Ryan Romeo MD  and providing coverage with Melody Armstrong MD  Providing Infectious Disease Consultations at [facility], [facility], [facility]'s    Office# [phone] to schedule follow up appointments  Answering Service for urgent calls or New Consults [phone]  Cell# to text for urgent issues Juventino Whitten [phone]     infectious diseases progress note:    SARAH MORRISON is a 53y y. o. Male patient    Overnight and events of the last 24hrs reviewed    Allergies    No Known Drug Allergies  fish (Hives)    Intolerances        ANTIBIOTICS/RELEVANT:  antimicrobials  amoxicillin 1000 milliGRAM(s) Oral three times a day  ceFAZolin   IVPB 2000 milliGRAM(s) IV Intermittent every 8 hours    immunologic:    OTHER:  acetaminophen     Tablet .. 650 milliGRAM(s) Oral every 6 hours PRN  aluminum hydroxide/magnesium hydroxide/simethicone Suspension 30 milliLiter(s) Oral every 4 hours PRN  apixaban 5 milliGRAM(s) Oral every 12 hours  ascorbic acid 500 milliGRAM(s) Oral daily  aspirin  chewable 81 milliGRAM(s) Oral daily  atorvastatin 40 milliGRAM(s) Oral at bedtime  bisacodyl 10 milliGRAM(s) Oral daily  cholecalciferol 1000 Unit(s) Oral daily  dextrose 5%. 1000 milliLiter(s) IV Continuous <Continuous>  dextrose 5%. 1000 milliLiter(s) IV Continuous <Continuous>  dextrose 50% Injectable 25 Gram(s) IV Push once  dextrose 50% Injectable 12.5 Gram(s) IV Push once  dextrose Oral Gel 15 Gram(s) Oral once PRN  diltiazem    milliGRAM(s) Oral daily  ferrous    sulfate 325 milliGRAM(s) Oral two times a day  furosemide    Tablet 40 milliGRAM(s) Oral two times a day  gabapentin 300 milliGRAM(s) Oral every 12 hours  glucagon  Injectable 1 milliGRAM(s) IntraMuscular once  insulin glargine Injectable (LANTUS) 10 Unit(s) SubCutaneous at bedtime  insulin lispro (ADMELOG) corrective regimen sliding scale   SubCutaneous at bedtime  insulin lispro (ADMELOG) corrective regimen sliding scale   SubCutaneous three times a day before meals  insulin lispro Injectable (ADMELOG) 5 Unit(s) SubCutaneous three times a day before meals  loratadine 10 milliGRAM(s) Oral daily  melatonin 5 milliGRAM(s) Oral at bedtime  metoclopramide 5 milliGRAM(s) Oral three times a day  multivitamin/minerals 1 Tablet(s) Oral daily  naloxegol 25 milliGRAM(s) Oral daily  ondansetron Injectable 4 milliGRAM(s) IV Push every 6 hours PRN  oxybutynin 5 milliGRAM(s) Oral two times a day  oxyCODONE    IR 10 milliGRAM(s) Oral two times a day  pantoprazole    Tablet 40 milliGRAM(s) Oral before breakfast  polyethylene glycol 3350 17 Gram(s) Oral two times a day  predniSONE   Tablet 20 milliGRAM(s) Oral daily  saccharomyces boulardii 250 milliGRAM(s) Oral two times a day  senna 2 Tablet(s) Oral at bedtime  sodium chloride 0.65% Nasal 1 Spray(s) Both Nostrils daily  sucralfate 1 Gram(s) Oral two times a day  tiotropium 2.5 MICROgram(s) Inhaler 2 Puff(s) Inhalation daily      Objective:  Vital Signs Last 24 Hrs  T(C): 36.7 (29 Mar 2024 05:10), Max: 36.8 (28 Mar 2024 13:05)  T(F): 98 (29 Mar 2024 05:10), Max: 98.2 (28 Mar 2024 13:05)  HR: 88 (29 Mar 2024 05:10) (85 - 88)  BP: 103/63 (29 Mar 2024 05:10) (103/63 - 123/69)  BP(mean): --  RR: 18 (29 Mar 2024 05:10) (17 - 18)  SpO2: 93% (29 Mar 2024 05:10) (93% - 95%)    Parameters below as of 29 Mar 2024 05:10  Patient On (Oxygen Delivery Method): room air        T(C): 36.7 (03-29-24 @ 05:10), Max: 36.8 (03-28-24 @ 13:05)  T(C): 36.7 (03-29-24 @ 05:10), Max: 37.2 (03-26-24 @ 14:43)  T(C): 36.7 (03-29-24 @ 05:10), Max: 37.2 (03-26-24 @ 14:43)    PHYSICAL EXAM:  HEENT: NC atraumatic  Neck: supple  Respiratory: no accessory muscle use, breathing comfortably  Cardiovascular: distant  Gastrointestinal: normal appearing, nondistended  Extremities: no clubbing, no cyanosis,        LABS:                          12.4   13.17 )-----------( 325      ( 29 Mar 2024 09:35 )             37.4       WBC  13.17 03-29 @ 09:35  11.26 03-28 @ 06:34  11.88 03-27 @ 06:12  10.72 03-26 @ 07:20  10.44 03-25 @ 07:55      03-29    142  |  105  |  17  ----------------------------<  290<H>  3.4<L>   |  30  |  1.10    Ca    8.3<L>      29 Mar 2024 09:35  Mg     1.6     03-29        Creatinine: 1.10 mg/dL (03-29-24 @ 09:35)  Creatinine: 1.10 mg/dL (03-28-24 @ 06:34)  Creatinine: 1.00 mg/dL (03-27-24 @ 06:12)  Creatinine: 1.20 mg/dL (03-26-24 @ 07:20)  Creatinine: 1.10 mg/dL (03-25-24 @ 07:55)  Creatinine: 1.10 mg/dL (03-24-24 @ 07:45)        Urinalysis Basic - ( 29 Mar 2024 09:35 )    Color: x / Appearance: x / SG: x / pH: x  Gluc: 290 mg/dL / Ketone: x  / Bili: x / Urobili: x   Blood: x / Protein: x / Nitrite: x   Leuk Esterase: x / RBC: x / WBC x   Sq Epi: x / Non Sq Epi: x / Bacteria: x        INFLAMMATORY MARKERS      MICROBIOLOGY:              RADIOLOGY & ADDITIONAL STUDIES:  Surgical Pathology Report (03.26.24 @ 13:00)    Surgical Pathology Report:   ACCESSION No:  [id number]  Patient:     SARAH MORRISON LANE      Accession:                             30- [id number]    Collected Date/Time:                   3/26/2024 13:00 EDT  Received Date/Time:                    3/27/2024 06:45 EDT    Surgical Pathology Report - Auth (Verified)    Specimen(s) Submitted  1  Right heel tissue, for pathology  2  Right heel bone, for pathology    Final Diagnosis  1. Right heel tissue:  Fibroadipose tissue with inflammation and necrosis      2. Right heel bone:  Bone tissue with reactive changes, few plasma cells and marrow fibrosis  present compatible with minimal chronic osteomyelitis  Verified by: Shaquille Coronado MD  (Electronic Signature)  Reported on: 03/28/24 11:53 EDT, [facility], [address] [place] [id number]  _________________________________________________________________      Clinical Information  Right foot wound  Procedure: Right foot wound debridement and bone biopsy    Perioperative Diagnosis  Right foot wound    Postoperative Diagnosis  Same    Gross Description    1.  Received: In formalin labeled  "right heel tissue" , consisting of  multiple irregular pieces of non-viable appearing soft tissue displaying  a variegated appearance  Dimensions:  4.5 x 3.5 x 1.0 cm aggregate  Submitted:  Representative tissue in one cassette: 1A    2.  Received: In formalin labeled  "right heel bone pathology"  Size:  One core measuring 1.0 cm in length x 0.2 cm in diameter  Description:  Tan-yellow, bony  Submitted: Entirely following acid decalcification in one cassette: 2A    MELISSA Crane ([facility]) 03/27/2024 09:45 AM    Disclaimer  In addition to other data that may appear on the specimen containers, all  labels have been inspected to confirm the presence ofthe patient's name  and date of birth.  Histological processing performed at [facility], Department of  Pathology, [address] [place] [address].

## 2024-03-29 NOTE — DISCHARGE NOTE PROVIDER - CARE PROVIDER_API CALL
Grisel Murray  Internal Medicine  [address] [place] [address]  Phone: [phone]  Fax: [phone]  Established Patient  Follow Up Time: 1 week    Jimbo Hale  Podiatric Medicine and Surgery  [address] [place] [address]  Phone: [phone]  Fax: [phone]  Established Patient  Follow Up Time: 1-3 days

## 2024-03-29 NOTE — PROGRESS NOTE ADULT - PROVIDER SPECIALTY LIST ADULT
Cardiology
Endocrinology
Infectious Disease
Internal Medicine
Internal Medicine
Pulmonology
Cardiology
Endocrinology
Infectious Disease
Internal Medicine
Pulmonology
Cardiology
Endocrinology
Infectious Disease
Infectious Disease
Pulmonology
Endocrinology
Podiatry
Podiatry
Internal Medicine

## 2024-03-31 LAB
CULTURE RESULTS: ABNORMAL
CULTURE RESULTS: ABNORMAL
ORGANISM # SPEC MICROSCOPIC CNT: ABNORMAL
ORGANISM # SPEC MICROSCOPIC CNT: ABNORMAL
ORGANISM # SPEC MICROSCOPIC CNT: SIGNIFICANT CHANGE UP
SPECIMEN SOURCE: SIGNIFICANT CHANGE UP
SPECIMEN SOURCE: SIGNIFICANT CHANGE UP

## 2024-04-01 NOTE — CHART NOTE - NSCHARTNOTEFT_GEN_A_CORE
Called by RN for a 3.7 sec pause.  Patient is on Afib.  Reduce Cardizem CD to 300 mg daily starting in am and will hold 12am dose tonight.  Will D/C PO Digoxin.
Called by RN for patient with 8 beats of Vtach on tele monitor.   Patient was sleeping and comfortable in bed. Denies any chest pain or shortness of breath.   Patient currently on Cardizem gtt @ 15 mg/hr for Afib with RVR. Patient on Eliquis 5mg BID with known hx of Afib.   Ordered mag and phos for AM.   RN to call with any acute changes or any further changes on tele monitor.
Called by RN for patient with bp 99s/64 on cardizem drip at 7/hr. Heart rate continues to be in 140's. Patient asymptomatic.  - Uptitrate cardizem gtt to 10/hr. Will need to consider holding cardizem and starting digoxin if blood pressure continues to drop and heart rate not controlled.    ----  addendum  - BP 86/60, rhythm continues to be afib w/rvr in 140's-150's. Cardizem gtt running at 10/hr. Patient is asymptomatic, sleeping. Manual blood pressure 90/58.   - Will dig load patient and decrease cardizem gtt to 5/hr  - spoke with attending, who agrees with above plan
Called by RN, lab called with critical value tissue culture (s/p debridement of heel) positive 3/26 growing rare staph aureus, moderate enterococcus faecalis from right heel sample s/p 2 debridements      Assessment/Plan: 53yMale with PMH of  AF on Eliquis, indwelling jacques, CAD s/p stents, CVA, DM, HTN, osteomyelitis s/p debridementwas admitted for sepsis + ESBL bacteremia, Unstageable pressure ulcer of right heel.   ·  Plan: S/P selective debridement of R heal with bone bx -- POD #1    1.) Positive tissue culture, R heel ulcer  - discussed with pharmacy, antibiotic coverage  - Will give x1 dose vancomycin 1g now  - ID following, will reassess in daytime   - RN to call if any changes
Assessment: Pt seen for nutrition follow-up. Chart reviewed, hospital course noted.    Brief hx : Pt is a "54 yo M with PMH of AF on Eliquis, indwelling jacques, CAD s/p stents, CVA, DM, HTN, osteomyelitis s/p debridement, PE, sent from [facility] for elevated BP, SOB, and dislodged jacques."    Visited pt at bedside this am. Pt reports good appetite/intake. Ate well for breakfast meal this am. PO intakes % per nursing documentation. Tolerating diet well. Denies N/V. +BM 3/26; bowel regimen rx. Continues on DASH/TLC, Consistent Carbohydrate diet with NC Prosource daily. Pt states that he has not received prosource protein supplement on meal trays. Spoke with diet office- will provide at dinner meal tonight. Pt s/p debridement of R heel wound yesterday. Pt understands importance of adequate protein intake to help facilitate wound healing. Pt offers no additional food preference/meal requests at this time. RD remains available and will continue to follow-up.     Factors impacting intake: [X] none [ ] nausea  [ ] vomiting [ ] diarrhea [ ] constipation  [ ]chewing problems [ ] swallowing issues  [ ] other:     Diet Prescription: Diet, DASH/TLC:   Sodium & Cholesterol Restricted  Consistent Carbohydrate {No Snacks}  No Carb Prosource (1pkg = 15gms Protein)     Qty per Day:  1 (03-26-24 @ 20:43)    Intake: good    Current Weight: Weight (kg): 111.1 (03-26 @ 19:13), 3/25 247.5# (1+ BL leg edema noted)  % Weight Change    Pertinent Medications: MEDICATIONS  (STANDING):  apixaban 5 milliGRAM(s) Oral every 12 hours  ascorbic acid 500 milliGRAM(s) Oral daily  aspirin  chewable 81 milliGRAM(s) Oral daily  atorvastatin 40 milliGRAM(s) Oral at bedtime  bisacodyl 10 milliGRAM(s) Oral daily  cholecalciferol 1000 Unit(s) Oral daily  dextrose 5%. 1000 milliLiter(s) (50 mL/Hr) IV Continuous <Continuous>  dextrose 5%. 1000 milliLiter(s) (100 mL/Hr) IV Continuous <Continuous>  dextrose 50% Injectable 12.5 Gram(s) IV Push once  dextrose 50% Injectable 25 Gram(s) IV Push once  diltiazem    milliGRAM(s) Oral daily  ferrous    sulfate 325 milliGRAM(s) Oral two times a day  furosemide    Tablet 40 milliGRAM(s) Oral two times a day  gabapentin 300 milliGRAM(s) Oral every 12 hours  glucagon  Injectable 1 milliGRAM(s) IntraMuscular once  insulin glargine Injectable (LANTUS) 7 Unit(s) SubCutaneous at bedtime  insulin lispro (ADMELOG) corrective regimen sliding scale   SubCutaneous at bedtime  insulin lispro (ADMELOG) corrective regimen sliding scale   SubCutaneous three times a day before meals  insulin lispro Injectable (ADMELOG) 5 Unit(s) SubCutaneous three times a day before meals  loratadine 10 milliGRAM(s) Oral daily  melatonin 5 milliGRAM(s) Oral at bedtime  metoclopramide 5 milliGRAM(s) Oral three times a day  multivitamin/minerals 1 Tablet(s) Oral daily  naloxegol 25 milliGRAM(s) Oral daily  oxybutynin 5 milliGRAM(s) Oral two times a day  oxyCODONE    IR 10 milliGRAM(s) Oral two times a day  pantoprazole    Tablet 40 milliGRAM(s) Oral before breakfast  polyethylene glycol 3350 17 Gram(s) Oral two times a day  saccharomyces boulardii 250 milliGRAM(s) Oral two times a day  senna 2 Tablet(s) Oral at bedtime  sodium chloride 0.65% Nasal 1 Spray(s) Both Nostrils daily  sucralfate 1 Gram(s) Oral two times a day  tiotropium 2.5 MICROgram(s) Inhaler 2 Puff(s) Inhalation daily    MEDICATIONS  (PRN):  acetaminophen     Tablet .. 650 milliGRAM(s) Oral every 6 hours PRN Temp greater or equal to 38C (100.4F), Mild Pain (1 - 3)  aluminum hydroxide/magnesium hydroxide/simethicone Suspension 30 milliLiter(s) Oral every 4 hours PRN Dyspepsia  dextrose Oral Gel 15 Gram(s) Oral once PRN Blood Glucose LESS THAN 70 milliGRAM(s)/deciliter  ondansetron Injectable 4 milliGRAM(s) IV Push every 6 hours PRN Nausea and/or Vomiting    Pertinent Labs: 03-27 Na143 mmol/L Glu 139 mg/dL<H> K+ 3.4 mmol/L<L> Cr  1.00 mg/dL BUN 17 mg/dL    CAPILLARY BLOOD GLUCOSE  POCT Blood Glucose.: 157 mg/dL (27 Mar 2024 08:10)  POCT Blood Glucose.: 122 mg/dL (26 Mar 2024 23:27)  POCT Blood Glucose.: 126 mg/dL (26 Mar 2024 21:44)  POCT Blood Glucose.: 133 mg/dL (26 Mar 2024 20:38)  POCT Blood Glucose.: 134 mg/dL (26 Mar 2024 19:29)  POCT Blood Glucose.: 135 mg/dL (26 Mar 2024 17:02)  POCT Blood Glucose.: 139 mg/dL (26 Mar 2024 11:38)    Skin: unstageable to R heel    Estimated Needs:   [X] no change since previous assessment: based on #/86.1kg  20-25kcal/kg (1722-2152kcal)  1.2-1.4g pro/kg (103-121gm protein)  [ ] recalculated:     Previous Nutrition Diagnosis:   [ ] Inadequate Energy Intake [ ]Inadequate Oral Intake [ ] Excessive Energy Intake   [ ] Underweight [X] Increased Nutrient Needs [ ] Overweight/Obesity   [ ] Altered GI Function [ ] Unintended Weight Loss [ ] Food & Nutrition Related Knowledge Deficit [ ] Malnutrition     Nutrition Diagnosis is [X] ongoing  [ ] resolved [ ] not applicable     New Nutrition Diagnosis: [X] not applicable     Interventions:   Recommend  [ ] Change Diet To:  [ ] Nutrition Supplement  [ ] Nutrition Support  [X] Other: Continue current diet as tolerated  Continue MVI/minerals and Vitamin C supplementation    Monitoring and Evaluation:   [X] PO intake [ x ] Tolerance to diet prescription [ x ] weights [ x ] labs[ x ] follow up per protocol  [X] other: s/s GI distress, bowel function, skin integrity /edema
Called by [facility] regarding patient's final wound culture results (MRSA, Enterococcus faecalis, and Corynebacterium striatum). Case d/w ID Dr. Whitten -- to continue current rx. Results relayed to patient's SNF MD, Dr. Murray.     Culture - Tissue with Gram Stain (03.26.24 @ 20:10)   - Penicillin: R >8  - Rifampin: S <=1 Should not be used as monotherapy  - Tetracycline: R >8  - Trimethoprim/Sulfamethoxazole: S <=0.5/9.5  - Vancomycin: S 1  - Vancomycin: S 2  Gram Stain:   Rare polymorphonuclear leukocytes seen per low power field   Few Gram positive cocci in pairs seen per oil power field  - Ampicillin: S <=2 Predicts results to ampicillin/sulbactam, amoxacillin-clavulanate and piperacillin-tazobactam.  - Ampicillin/Sulbactam: R <=8/4  - Cefazolin: R <=4  - Clindamycin: S <=0.25  - Daptomycin: S <=0.25  - Erythromycin: S <=0.25  - Gentamicin: R >8 Should not be used as monotherapy  - Linezolid: S 2  - Oxacillin: R >2  Specimen Source: .Tissue Other, RIGHT HEEL TISSUE  Culture Results:   Few Methicillin Resistant Staphylococcus aureus   Moderate Enterococcus faecalis  Organism Identification: Methicillin resistant Staphylococcus aureus   Enterococcus faecalis  Organism: Methicillin resistant Staphylococcus aureus  Organism: Enterococcus faecalis  Method Type: BAYLEE  Method Type: BAYLEE

## 2024-05-24 ENCOUNTER — OUTPATIENT (OUTPATIENT)
Dept: OUTPATIENT SERVICES | Facility: HOSPITAL | Age: 54
LOS: 1 days | Discharge: HOME | End: 2024-05-24
Payer: MEDICAID

## 2024-05-24 ENCOUNTER — APPOINTMENT (OUTPATIENT)
Dept: WOUND CARE | Facility: HOSPITAL | Age: 54
End: 2024-05-24
Payer: MEDICAID

## 2024-05-24 VITALS
DIASTOLIC BLOOD PRESSURE: 71 MMHG | BODY MASS INDEX: 28.23 KG/M2 | RESPIRATION RATE: 20 BRPM | TEMPERATURE: 98.8 F | OXYGEN SATURATION: 95 % | SYSTOLIC BLOOD PRESSURE: 110 MMHG | WEIGHT: 220 LBS | HEART RATE: 100 BPM | HEIGHT: 74 IN

## 2024-05-24 DIAGNOSIS — Z98.890 OTHER SPECIFIED POSTPROCEDURAL STATES: Chronic | ICD-10-CM

## 2024-05-24 DIAGNOSIS — S98.912A COMPLETE TRAUMATIC AMPUTATION OF LEFT FOOT, LEVEL UNSPECIFIED, INITIAL ENCOUNTER: Chronic | ICD-10-CM

## 2024-05-24 DIAGNOSIS — S91.309A UNSPECIFIED OPEN WOUND, UNSPECIFIED FOOT, INITIAL ENCOUNTER: ICD-10-CM

## 2024-05-24 DIAGNOSIS — E11.40 TYPE 2 DIABETES MELLITUS WITH DIABETIC NEUROPATHY, UNSPECIFIED: ICD-10-CM

## 2024-05-24 DIAGNOSIS — M86.671 OTHER CHRONIC OSTEOMYELITIS, RIGHT ANKLE AND FOOT: ICD-10-CM

## 2024-05-24 DIAGNOSIS — L97.414 NON-PRESSURE CHRONIC ULCER OF RIGHT HEEL AND MIDFOOT WITH NECROSIS OF BONE: ICD-10-CM

## 2024-05-24 DIAGNOSIS — K25.5 CHRONIC OR UNSPECIFIED GASTRIC ULCER WITH PERFORATION: Chronic | ICD-10-CM

## 2024-05-24 DIAGNOSIS — E11.69 TYPE 2 DIABETES MELLITUS WITH OTHER SPECIFIED COMPLICATION: ICD-10-CM

## 2024-05-24 PROCEDURE — G0463: CPT

## 2024-05-24 PROCEDURE — 99214 OFFICE O/P EST MOD 30 MIN: CPT

## 2024-05-24 RX ORDER — OXYBUTYNIN CHLORIDE 10 MG/1
10 TABLET, EXTENDED RELEASE ORAL
Refills: 0 | Status: ACTIVE | COMMUNITY

## 2024-05-24 RX ORDER — LORATADINE 10 MG
17 TABLET,DISINTEGRATING ORAL
Refills: 0 | Status: ACTIVE | COMMUNITY

## 2024-05-24 RX ORDER — INSULIN GLARGINE-YFGN 100 [IU]/ML
100 INJECTION, SOLUTION SUBCUTANEOUS
Refills: 0 | Status: ACTIVE | COMMUNITY

## 2024-05-24 RX ORDER — CHOLECALCIFEROL (VITAMIN D3)
CRYSTALS MISCELLANEOUS
Refills: 0 | Status: ACTIVE | COMMUNITY

## 2024-05-24 RX ORDER — METOPROLOL SUCCINATE 50 MG/1
50 TABLET, EXTENDED RELEASE ORAL
Refills: 0 | Status: ACTIVE | COMMUNITY

## 2024-05-24 RX ORDER — DILTIAZEM HYDROCHLORIDE 300 MG/1
300 TABLET, EXTENDED RELEASE ORAL
Refills: 0 | Status: ACTIVE | COMMUNITY

## 2024-05-24 RX ORDER — SALINE NASAL SPRAY 1.5 OZ
0.65 SOLUTION NASAL
Refills: 0 | Status: ACTIVE | COMMUNITY

## 2024-05-24 RX ORDER — ONDANSETRON 2 MG/ML
4 INJECTION INTRAMUSCULAR; INTRAVENOUS
Refills: 0 | Status: ACTIVE | COMMUNITY

## 2024-05-24 RX ORDER — ONDANSETRON HYDROCHLORIDE 2 MG/ML
4 INJECTION INTRAMUSCULAR; INTRAVENOUS
Refills: 0 | Status: ACTIVE | COMMUNITY

## 2024-05-24 RX ORDER — LOSARTAN POTASSIUM 25 MG/1
25 TABLET, FILM COATED ORAL
Refills: 0 | Status: ACTIVE | COMMUNITY

## 2024-05-24 RX ORDER — DOCUSATE SODIUM 100 MG/1
100 CAPSULE ORAL
Refills: 0 | Status: ACTIVE | COMMUNITY

## 2024-05-24 RX ORDER — PANTOPRAZOLE SODIUM 40 MG/1
40 TABLET, DELAYED RELEASE ORAL
Refills: 0 | Status: ACTIVE | COMMUNITY

## 2024-05-24 RX ORDER — CHLORHEXIDINE GLUCONATE 4 %
325 (65 FE) LIQUID (ML) TOPICAL
Refills: 0 | Status: ACTIVE | COMMUNITY

## 2024-05-24 RX ORDER — TAMSULOSIN HYDROCHLORIDE 0.4 MG/1
0.4 CAPSULE ORAL DAILY
Refills: 0 | Status: DISCONTINUED | COMMUNITY
End: 2024-05-24

## 2024-05-24 RX ORDER — SENNOSIDES 8.6 MG/1
8.6 TABLET ORAL
Refills: 0 | Status: ACTIVE | COMMUNITY

## 2024-05-24 RX ORDER — METHOCARBAMOL 750 MG/1
750 TABLET, FILM COATED ORAL TWICE DAILY
Refills: 0 | Status: ACTIVE | COMMUNITY

## 2024-05-24 RX ORDER — NALOXEGOL OXALATE 25 MG/1
25 TABLET, FILM COATED ORAL
Refills: 0 | Status: ACTIVE | COMMUNITY

## 2024-05-24 RX ORDER — APIXABAN 5 MG/1
5 TABLET, FILM COATED ORAL
Refills: 0 | Status: ACTIVE | COMMUNITY

## 2024-05-24 RX ORDER — ELECTROLYTES/DEXTROSE
SOLUTION, ORAL ORAL
Refills: 0 | Status: ACTIVE | COMMUNITY

## 2024-05-24 RX ORDER — ATORVASTATIN CALCIUM 40 MG/1
40 TABLET, FILM COATED ORAL
Refills: 0 | Status: ACTIVE | COMMUNITY

## 2024-05-24 RX ORDER — TRAMADOL HYDROCHLORIDE 50 MG/1
50 TABLET, COATED ORAL EVERY 6 HOURS
Refills: 0 | Status: DISCONTINUED | COMMUNITY
End: 2024-05-24

## 2024-05-24 RX ORDER — INSULIN LISPRO 100 U/ML
100 INJECTION, SOLUTION INTRAVENOUS; SUBCUTANEOUS
Refills: 0 | Status: ACTIVE | COMMUNITY

## 2024-05-24 RX ORDER — METFORMIN HYDROCHLORIDE 1000 MG/1
1000 TABLET, COATED ORAL
Refills: 0 | Status: ACTIVE | COMMUNITY

## 2024-05-24 RX ORDER — TIOTROPIUM BROMIDE INHALATION SPRAY 3.12 UG/1
2.5 SPRAY, METERED RESPIRATORY (INHALATION)
Refills: 0 | Status: ACTIVE | COMMUNITY

## 2024-05-24 RX ORDER — GABAPENTIN 300 MG/1
300 CAPSULE ORAL
Refills: 0 | Status: ACTIVE | COMMUNITY

## 2024-05-24 RX ORDER — CHLORHEXIDINE GLUCONATE 4 %
5 LIQUID (ML) TOPICAL
Refills: 0 | Status: ACTIVE | COMMUNITY

## 2024-05-24 RX ORDER — SUCRALFATE 1 G/1
1 TABLET ORAL 4 TIMES DAILY
Refills: 0 | Status: ACTIVE | COMMUNITY

## 2024-05-24 RX ORDER — POTASSIUM CHLORIDE 20 MEQ
20 TABLET, EXT RELEASE, PARTICLES/CRYSTALS ORAL
Refills: 0 | Status: ACTIVE | COMMUNITY

## 2024-05-24 RX ORDER — ISOSORBIDE MONONITRATE 30 MG
30 TABLET, EXTENDED RELEASE 24 HR ORAL
Refills: 0 | Status: DISCONTINUED | COMMUNITY
End: 2024-05-24

## 2024-05-24 RX ORDER — TORSEMIDE 20 MG/1
20 TABLET ORAL
Refills: 0 | Status: ACTIVE | COMMUNITY

## 2024-05-24 RX ORDER — FAMOTIDINE 20 MG/1
20 TABLET, FILM COATED ORAL
Refills: 0 | Status: ACTIVE | COMMUNITY

## 2024-05-24 RX ORDER — AMPICILLIN TRIHYDRATE 500 MG
450 CAPSULE ORAL
Refills: 0 | Status: ACTIVE | COMMUNITY

## 2024-05-24 RX ORDER — METHIMAZOLE 10 MG/1
10 TABLET ORAL
Refills: 0 | Status: DISCONTINUED | COMMUNITY
End: 2024-05-24

## 2024-05-24 RX ORDER — OXYCODONE 10 MG/1
10 TABLET ORAL EVERY 6 HOURS
Refills: 0 | Status: ACTIVE | COMMUNITY

## 2024-05-24 RX ORDER — BETHANECHOL CHLORIDE 25 MG/1
25 TABLET ORAL EVERY 8 HOURS
Refills: 0 | Status: DISCONTINUED | COMMUNITY
End: 2024-05-24

## 2024-05-24 RX ORDER — FUROSEMIDE 20 MG/1
20 TABLET ORAL DAILY
Refills: 0 | Status: DISCONTINUED | COMMUNITY
End: 2024-05-24

## 2024-05-24 RX ORDER — INSULIN GLARGINE 100 [IU]/ML
100 INJECTION, SOLUTION SUBCUTANEOUS
Refills: 0 | Status: DISCONTINUED | COMMUNITY
End: 2024-05-24

## 2024-05-24 RX ORDER — PHENAZOPYRIDINE 100 MG/1
100 TABLET, FILM COATED ORAL
Refills: 0 | Status: ACTIVE | COMMUNITY

## 2024-05-24 RX ORDER — LEVOCETIRIZINE DIHYDROCHLORIDE 5 MG/1
5 TABLET, FILM COATED ORAL
Refills: 0 | Status: ACTIVE | COMMUNITY

## 2024-05-24 RX ORDER — FEXOFENADINE HYDROCHLORIDE 60 MG/1
60 TABLET, FILM COATED ORAL
Refills: 0 | Status: ACTIVE | COMMUNITY

## 2024-05-24 RX ORDER — ASCORBIC ACID 500 MG
TABLET ORAL
Refills: 0 | Status: ACTIVE | COMMUNITY

## 2024-05-24 RX ORDER — TRIAMCINOLONE ACETONIDE 0.25 MG/ML
0.03 LOTION TOPICAL
Refills: 0 | Status: ACTIVE | COMMUNITY

## 2024-05-24 RX ORDER — SODIUM PHOSPHATE, DIBASIC AND SODIUM PHOSPHATE, MONOBASIC 7; 19 G/230ML; G/230ML
ENEMA RECTAL
Refills: 0 | Status: ACTIVE | COMMUNITY

## 2024-05-24 RX ORDER — SALINE 7; 19 G/118ML; G/118ML
ENEMA RECTAL
Refills: 0 | Status: ACTIVE | COMMUNITY

## 2024-05-24 RX ORDER — INSULIN ZINC HUMAN RECOMBINANT 100/ML
100 VIAL (ML) SUBCUTANEOUS
Refills: 0 | Status: ACTIVE | COMMUNITY

## 2024-05-24 RX ORDER — METOCLOPRAMIDE 5 MG/1
5 TABLET ORAL
Refills: 0 | Status: ACTIVE | COMMUNITY

## 2024-05-28 ENCOUNTER — INPATIENT (INPATIENT)
Facility: HOSPITAL | Age: 54
LOS: 15 days | Discharge: NURSING, REHAB OR LONG-TERM CARE | DRG: 639 | End: 2024-06-13
Attending: FAMILY MEDICINE | Admitting: FAMILY MEDICINE
Payer: MEDICAID

## 2024-05-28 VITALS
DIASTOLIC BLOOD PRESSURE: 87 MMHG | RESPIRATION RATE: 20 BRPM | SYSTOLIC BLOOD PRESSURE: 139 MMHG | WEIGHT: 240.08 LBS | OXYGEN SATURATION: 96 % | HEART RATE: 117 BPM | HEIGHT: 74 IN | TEMPERATURE: 98 F

## 2024-05-28 DIAGNOSIS — Z29.9 ENCOUNTER FOR PROPHYLACTIC MEASURES, UNSPECIFIED: ICD-10-CM

## 2024-05-28 DIAGNOSIS — E11.621 TYPE 2 DIABETES MELLITUS WITH FOOT ULCER: ICD-10-CM

## 2024-05-28 DIAGNOSIS — K25.5 CHRONIC OR UNSPECIFIED GASTRIC ULCER WITH PERFORATION: Chronic | ICD-10-CM

## 2024-05-28 DIAGNOSIS — S98.912A COMPLETE TRAUMATIC AMPUTATION OF LEFT FOOT, LEVEL UNSPECIFIED, INITIAL ENCOUNTER: Chronic | ICD-10-CM

## 2024-05-28 DIAGNOSIS — E11.9 TYPE 2 DIABETES MELLITUS WITHOUT COMPLICATIONS: ICD-10-CM

## 2024-05-28 DIAGNOSIS — Z98.890 OTHER SPECIFIED POSTPROCEDURAL STATES: Chronic | ICD-10-CM

## 2024-05-28 DIAGNOSIS — M79.89 OTHER SPECIFIED SOFT TISSUE DISORDERS: ICD-10-CM

## 2024-05-28 LAB
ALBUMIN SERPL ELPH-MCNC: 2.3 G/DL — LOW (ref 3.3–5)
ALP SERPL-CCNC: 102 U/L — SIGNIFICANT CHANGE UP (ref 40–120)
ALT FLD-CCNC: 17 U/L — SIGNIFICANT CHANGE UP (ref 12–78)
ANION GAP SERPL CALC-SCNC: 5 MMOL/L — SIGNIFICANT CHANGE UP (ref 5–17)
APTT BLD: 36.7 SEC — HIGH (ref 24.5–35.6)
AST SERPL-CCNC: 20 U/L — SIGNIFICANT CHANGE UP (ref 15–37)
BASOPHILS # BLD AUTO: 0.05 K/UL — SIGNIFICANT CHANGE UP (ref 0–0.2)
BASOPHILS NFR BLD AUTO: 0.5 % — SIGNIFICANT CHANGE UP (ref 0–2)
BILIRUB SERPL-MCNC: 0.3 MG/DL — SIGNIFICANT CHANGE UP (ref 0.2–1.2)
BUN SERPL-MCNC: 7 MG/DL — SIGNIFICANT CHANGE UP (ref 7–23)
CALCIUM SERPL-MCNC: 8.9 MG/DL — SIGNIFICANT CHANGE UP (ref 8.5–10.1)
CHLORIDE SERPL-SCNC: 103 MMOL/L — SIGNIFICANT CHANGE UP (ref 96–108)
CO2 SERPL-SCNC: 32 MMOL/L — HIGH (ref 22–31)
CREAT SERPL-MCNC: 1 MG/DL — SIGNIFICANT CHANGE UP (ref 0.5–1.3)
EGFR: 89 ML/MIN/1.73M2 — SIGNIFICANT CHANGE UP
EOSINOPHIL # BLD AUTO: 2.02 K/UL — HIGH (ref 0–0.5)
EOSINOPHIL NFR BLD AUTO: 18.4 % — HIGH (ref 0–6)
ERYTHROCYTE [SEDIMENTATION RATE] IN BLOOD: 19 MM/HR — SIGNIFICANT CHANGE UP (ref 0–20)
GLUCOSE BLDC GLUCOMTR-MCNC: 116 MG/DL — HIGH (ref 70–99)
GLUCOSE BLDC GLUCOMTR-MCNC: 85 MG/DL — SIGNIFICANT CHANGE UP (ref 70–99)
GLUCOSE SERPL-MCNC: 133 MG/DL — HIGH (ref 70–99)
HCT VFR BLD CALC: 38 % — LOW (ref 39–50)
HGB BLD-MCNC: 11.6 G/DL — LOW (ref 13–17)
IMM GRANULOCYTES NFR BLD AUTO: 0.7 % — SIGNIFICANT CHANGE UP (ref 0–0.9)
INR BLD: 1.23 RATIO — HIGH (ref 0.85–1.18)
LYMPHOCYTES # BLD AUTO: 1.15 K/UL — SIGNIFICANT CHANGE UP (ref 1–3.3)
LYMPHOCYTES # BLD AUTO: 10.5 % — LOW (ref 13–44)
MCHC RBC-ENTMCNC: 30.1 PG — SIGNIFICANT CHANGE UP (ref 27–34)
MCHC RBC-ENTMCNC: 30.5 GM/DL — LOW (ref 32–36)
MCV RBC AUTO: 98.7 FL — SIGNIFICANT CHANGE UP (ref 80–100)
MONOCYTES # BLD AUTO: 0.91 K/UL — HIGH (ref 0–0.9)
MONOCYTES NFR BLD AUTO: 8.3 % — SIGNIFICANT CHANGE UP (ref 2–14)
NEUTROPHILS # BLD AUTO: 6.78 K/UL — SIGNIFICANT CHANGE UP (ref 1.8–7.4)
NEUTROPHILS NFR BLD AUTO: 61.6 % — SIGNIFICANT CHANGE UP (ref 43–77)
NRBC # BLD: 0 /100 WBCS — SIGNIFICANT CHANGE UP (ref 0–0)
PLATELET # BLD AUTO: 348 K/UL — SIGNIFICANT CHANGE UP (ref 150–400)
POTASSIUM SERPL-MCNC: 3.4 MMOL/L — LOW (ref 3.5–5.3)
POTASSIUM SERPL-SCNC: 3.4 MMOL/L — LOW (ref 3.5–5.3)
PROT SERPL-MCNC: 7.1 G/DL — SIGNIFICANT CHANGE UP (ref 6–8.3)
PROTHROM AB SERPL-ACNC: 14.3 SEC — HIGH (ref 9.5–13)
RBC # BLD: 3.85 M/UL — LOW (ref 4.2–5.8)
RBC # FLD: 14.6 % — HIGH (ref 10.3–14.5)
SODIUM SERPL-SCNC: 140 MMOL/L — SIGNIFICANT CHANGE UP (ref 135–145)
WBC # BLD: 10.99 K/UL — HIGH (ref 3.8–10.5)
WBC # FLD AUTO: 10.99 K/UL — HIGH (ref 3.8–10.5)

## 2024-05-28 PROCEDURE — 71045 X-RAY EXAM CHEST 1 VIEW: CPT | Mod: 26

## 2024-05-28 PROCEDURE — 99223 1ST HOSP IP/OBS HIGH 75: CPT

## 2024-05-28 PROCEDURE — 93010 ELECTROCARDIOGRAM REPORT: CPT

## 2024-05-28 PROCEDURE — 73630 X-RAY EXAM OF FOOT: CPT | Mod: 26,RT

## 2024-05-28 PROCEDURE — 99285 EMERGENCY DEPT VISIT HI MDM: CPT

## 2024-05-28 RX ORDER — ATORVASTATIN CALCIUM 80 MG/1
1 TABLET, FILM COATED ORAL
Refills: 0 | DISCHARGE

## 2024-05-28 RX ORDER — INSULIN GLARGINE 100 [IU]/ML
7 INJECTION, SOLUTION SUBCUTANEOUS
Refills: 0 | DISCHARGE

## 2024-05-28 RX ORDER — SENNA PLUS 8.6 MG/1
2 TABLET ORAL AT BEDTIME
Refills: 0 | Status: DISCONTINUED | OUTPATIENT
Start: 2024-05-28 | End: 2024-06-04

## 2024-05-28 RX ORDER — POTASSIUM CHLORIDE 20 MEQ
10 PACKET (EA) ORAL
Refills: 0 | Status: DISCONTINUED | OUTPATIENT
Start: 2024-05-28 | End: 2024-05-31

## 2024-05-28 RX ORDER — FEXOFENADINE HCL 30 MG
1 TABLET ORAL
Refills: 0 | DISCHARGE

## 2024-05-28 RX ORDER — ONDANSETRON 8 MG/1
4 TABLET, FILM COATED ORAL EVERY 8 HOURS
Refills: 0 | Status: DISCONTINUED | OUTPATIENT
Start: 2024-05-28 | End: 2024-06-04

## 2024-05-28 RX ORDER — GABAPENTIN 400 MG/1
1 CAPSULE ORAL
Refills: 0 | DISCHARGE

## 2024-05-28 RX ORDER — FERROUS SULFATE 325(65) MG
1 TABLET ORAL
Qty: 0 | Refills: 0 | DISCHARGE

## 2024-05-28 RX ORDER — INSULIN LISPRO 100/ML
VIAL (ML) SUBCUTANEOUS AT BEDTIME
Refills: 0 | Status: DISCONTINUED | OUTPATIENT
Start: 2024-05-28 | End: 2024-06-02

## 2024-05-28 RX ORDER — TIOTROPIUM BROMIDE 18 UG/1
2 CAPSULE ORAL; RESPIRATORY (INHALATION) DAILY
Refills: 0 | Status: DISCONTINUED | OUTPATIENT
Start: 2024-05-28 | End: 2024-06-04

## 2024-05-28 RX ORDER — DOCUSATE SODIUM 100 MG
3 CAPSULE ORAL
Refills: 0 | DISCHARGE

## 2024-05-28 RX ORDER — APIXABAN 2.5 MG/1
1 TABLET, FILM COATED ORAL
Refills: 0 | DISCHARGE

## 2024-05-28 RX ORDER — LEVOCETIRIZINE DIHYDROCHLORIDE 0.5 MG/ML
1 SOLUTION ORAL
Refills: 0 | DISCHARGE

## 2024-05-28 RX ORDER — FUROSEMIDE 40 MG
40 TABLET ORAL
Refills: 0 | Status: DISCONTINUED | OUTPATIENT
Start: 2024-05-28 | End: 2024-06-01

## 2024-05-28 RX ORDER — OXYCODONE HYDROCHLORIDE 5 MG/1
1 TABLET ORAL
Refills: 0 | DISCHARGE

## 2024-05-28 RX ORDER — INSULIN GLARGINE 100 [IU]/ML
15 INJECTION, SOLUTION SUBCUTANEOUS
Refills: 0 | DISCHARGE

## 2024-05-28 RX ORDER — MAGNESIUM HYDROXIDE 400 MG/1
30 TABLET, CHEWABLE ORAL
Qty: 0 | Refills: 0 | DISCHARGE

## 2024-05-28 RX ORDER — GABAPENTIN 400 MG/1
300 CAPSULE ORAL EVERY 12 HOURS
Refills: 0 | Status: DISCONTINUED | OUTPATIENT
Start: 2024-05-28 | End: 2024-06-04

## 2024-05-28 RX ORDER — POTASSIUM CHLORIDE 20 MEQ
1 PACKET (EA) ORAL
Refills: 0 | DISCHARGE

## 2024-05-28 RX ORDER — OXYCODONE HYDROCHLORIDE 5 MG/1
10 TABLET ORAL
Refills: 0 | Status: DISCONTINUED | OUTPATIENT
Start: 2024-05-28 | End: 2024-06-04

## 2024-05-28 RX ORDER — MULTIVIT-MIN/FERROUS GLUCONATE 9 MG/15 ML
1 LIQUID (ML) ORAL
Refills: 0 | DISCHARGE

## 2024-05-28 RX ORDER — DEXTROSE 50 % IN WATER 50 %
12.5 SYRINGE (ML) INTRAVENOUS ONCE
Refills: 0 | Status: DISCONTINUED | OUTPATIENT
Start: 2024-05-28 | End: 2024-06-04

## 2024-05-28 RX ORDER — FERROUS SULFATE 325(65) MG
325 TABLET ORAL
Refills: 0 | Status: DISCONTINUED | OUTPATIENT
Start: 2024-05-28 | End: 2024-06-04

## 2024-05-28 RX ORDER — ATORVASTATIN CALCIUM 80 MG/1
40 TABLET, FILM COATED ORAL AT BEDTIME
Refills: 0 | Status: DISCONTINUED | OUTPATIENT
Start: 2024-05-28 | End: 2024-06-04

## 2024-05-28 RX ORDER — PHENAZOPYRIDINE HCL 100 MG
1 TABLET ORAL
Refills: 0 | DISCHARGE

## 2024-05-28 RX ORDER — GLUCAGON INJECTION, SOLUTION 0.5 MG/.1ML
1 INJECTION, SOLUTION SUBCUTANEOUS ONCE
Refills: 0 | Status: DISCONTINUED | OUTPATIENT
Start: 2024-05-28 | End: 2024-06-04

## 2024-05-28 RX ORDER — OXYBUTYNIN CHLORIDE 5 MG
10 TABLET ORAL DAILY
Refills: 0 | Status: DISCONTINUED | OUTPATIENT
Start: 2024-05-28 | End: 2024-05-28

## 2024-05-28 RX ORDER — POLYETHYLENE GLYCOL 3350 17 G/17G
17 POWDER, FOR SOLUTION ORAL DAILY
Refills: 0 | Status: DISCONTINUED | OUTPATIENT
Start: 2024-05-28 | End: 2024-06-04

## 2024-05-28 RX ORDER — METOCLOPRAMIDE HCL 10 MG
5 TABLET ORAL THREE TIMES A DAY
Refills: 0 | Status: DISCONTINUED | OUTPATIENT
Start: 2024-05-28 | End: 2024-06-04

## 2024-05-28 RX ORDER — NALOXEGOL OXALATE 12.5 MG/1
1 TABLET, FILM COATED ORAL
Refills: 0 | DISCHARGE

## 2024-05-28 RX ORDER — CEPHALEXIN 500 MG
1 CAPSULE ORAL
Qty: 0 | Refills: 0 | DISCHARGE

## 2024-05-28 RX ORDER — SODIUM CHLORIDE 0.65 %
1 AEROSOL, SPRAY (ML) NASAL
Refills: 0 | DISCHARGE

## 2024-05-28 RX ORDER — ASCORBIC ACID 60 MG
1 TABLET,CHEWABLE ORAL
Qty: 0 | Refills: 0 | DISCHARGE

## 2024-05-28 RX ORDER — SUCRALFATE 1 G
1 TABLET ORAL
Refills: 0 | DISCHARGE

## 2024-05-28 RX ORDER — HEPARIN SODIUM 5000 [USP'U]/ML
5000 INJECTION INTRAVENOUS; SUBCUTANEOUS EVERY 12 HOURS
Refills: 0 | Status: DISCONTINUED | OUTPATIENT
Start: 2024-05-28 | End: 2024-06-04

## 2024-05-28 RX ORDER — SUCRALFATE 1 G
1 TABLET ORAL
Refills: 0 | Status: DISCONTINUED | OUTPATIENT
Start: 2024-05-28 | End: 2024-06-04

## 2024-05-28 RX ORDER — LORATADINE 10 MG/1
10 TABLET ORAL DAILY
Refills: 0 | Status: DISCONTINUED | OUTPATIENT
Start: 2024-05-28 | End: 2024-06-04

## 2024-05-28 RX ORDER — CHOLECALCIFEROL (VITAMIN D3) 125 MCG
1000 CAPSULE ORAL DAILY
Refills: 0 | Status: DISCONTINUED | OUTPATIENT
Start: 2024-05-28 | End: 2024-06-04

## 2024-05-28 RX ORDER — MULTIVIT-MIN/FERROUS GLUCONATE 9 MG/15 ML
1 LIQUID (ML) ORAL DAILY
Refills: 0 | Status: DISCONTINUED | OUTPATIENT
Start: 2024-05-28 | End: 2024-06-04

## 2024-05-28 RX ORDER — POLYETHYLENE GLYCOL 3350 17 G/17G
17 POWDER, FOR SOLUTION ORAL
Refills: 0 | DISCHARGE

## 2024-05-28 RX ORDER — OXYBUTYNIN CHLORIDE 5 MG
1 TABLET ORAL
Refills: 0 | DISCHARGE

## 2024-05-28 RX ORDER — ASPIRIN/CALCIUM CARB/MAGNESIUM 324 MG
1 TABLET ORAL
Qty: 0 | Refills: 0 | DISCHARGE

## 2024-05-28 RX ORDER — INSULIN LISPRO 100/ML
VIAL (ML) SUBCUTANEOUS
Refills: 0 | Status: DISCONTINUED | OUTPATIENT
Start: 2024-05-28 | End: 2024-06-02

## 2024-05-28 RX ORDER — ASCORBIC ACID 60 MG
500 TABLET,CHEWABLE ORAL DAILY
Refills: 0 | Status: DISCONTINUED | OUTPATIENT
Start: 2024-05-28 | End: 2024-06-04

## 2024-05-28 RX ORDER — METOCLOPRAMIDE HCL 10 MG
1 TABLET ORAL
Refills: 0 | DISCHARGE

## 2024-05-28 RX ORDER — NALOXEGOL OXALATE 12.5 MG/1
25 TABLET, FILM COATED ORAL DAILY
Refills: 0 | Status: DISCONTINUED | OUTPATIENT
Start: 2024-05-28 | End: 2024-06-04

## 2024-05-28 RX ORDER — DEXTROSE 50 % IN WATER 50 %
25 SYRINGE (ML) INTRAVENOUS ONCE
Refills: 0 | Status: DISCONTINUED | OUTPATIENT
Start: 2024-05-28 | End: 2024-06-04

## 2024-05-28 RX ORDER — OXYBUTYNIN CHLORIDE 5 MG
10 TABLET ORAL DAILY
Refills: 0 | Status: DISCONTINUED | OUTPATIENT
Start: 2024-05-28 | End: 2024-05-30

## 2024-05-28 RX ORDER — LANOLIN ALCOHOL/MO/W.PET/CERES
1 CREAM (GRAM) TOPICAL
Refills: 0 | DISCHARGE

## 2024-05-28 RX ORDER — DILTIAZEM HCL 120 MG
300 CAPSULE, EXT RELEASE 24 HR ORAL DAILY
Refills: 0 | Status: DISCONTINUED | OUTPATIENT
Start: 2024-05-28 | End: 2024-05-30

## 2024-05-28 RX ORDER — FAMOTIDINE 10 MG/ML
1 INJECTION INTRAVENOUS
Qty: 0 | Refills: 0 | DISCHARGE

## 2024-05-28 RX ORDER — ACETAMINOPHEN 500 MG
650 TABLET ORAL EVERY 6 HOURS
Refills: 0 | Status: DISCONTINUED | OUTPATIENT
Start: 2024-05-28 | End: 2024-06-04

## 2024-05-28 RX ORDER — LANOLIN ALCOHOL/MO/W.PET/CERES
5 CREAM (GRAM) TOPICAL AT BEDTIME
Refills: 0 | Status: DISCONTINUED | OUTPATIENT
Start: 2024-05-28 | End: 2024-06-04

## 2024-05-28 RX ORDER — FAMOTIDINE 10 MG/ML
20 INJECTION INTRAVENOUS DAILY
Refills: 0 | Status: DISCONTINUED | OUTPATIENT
Start: 2024-05-28 | End: 2024-06-04

## 2024-05-28 RX ORDER — METOPROLOL TARTRATE 50 MG
25 TABLET ORAL
Refills: 0 | Status: DISCONTINUED | OUTPATIENT
Start: 2024-05-28 | End: 2024-05-30

## 2024-05-28 RX ORDER — SODIUM CHLORIDE 0.65 %
1 AEROSOL, SPRAY (ML) NASAL THREE TIMES A DAY
Refills: 0 | Status: DISCONTINUED | OUTPATIENT
Start: 2024-05-28 | End: 2024-06-04

## 2024-05-28 RX ORDER — INSULIN HUMAN 100 [IU]/ML
0 INJECTION, SOLUTION SUBCUTANEOUS
Qty: 0 | Refills: 0 | DISCHARGE

## 2024-05-28 RX ORDER — SENNA PLUS 8.6 MG/1
2 TABLET ORAL
Refills: 0 | DISCHARGE

## 2024-05-28 RX ORDER — CHOLECALCIFEROL (VITAMIN D3) 125 MCG
1 CAPSULE ORAL
Refills: 0 | DISCHARGE

## 2024-05-28 RX ORDER — METFORMIN HYDROCHLORIDE 850 MG/1
1 TABLET ORAL
Qty: 0 | Refills: 0 | DISCHARGE

## 2024-05-28 RX ORDER — DEXTROSE 50 % IN WATER 50 %
15 SYRINGE (ML) INTRAVENOUS ONCE
Refills: 0 | Status: DISCONTINUED | OUTPATIENT
Start: 2024-05-28 | End: 2024-06-04

## 2024-05-28 RX ADMIN — Medication 40 MILLIGRAM(S): at 18:17

## 2024-05-28 RX ADMIN — Medication 325 MILLIGRAM(S): at 18:13

## 2024-05-28 RX ADMIN — LORATADINE 10 MILLIGRAM(S): 10 TABLET ORAL at 22:28

## 2024-05-28 RX ADMIN — Medication 5 MILLIGRAM(S): at 22:02

## 2024-05-28 RX ADMIN — OXYCODONE HYDROCHLORIDE 10 MILLIGRAM(S): 5 TABLET ORAL at 18:14

## 2024-05-28 RX ADMIN — Medication 1 GRAM(S): at 18:13

## 2024-05-28 RX ADMIN — Medication 25 MILLIGRAM(S): at 18:13

## 2024-05-28 RX ADMIN — GABAPENTIN 300 MILLIGRAM(S): 400 CAPSULE ORAL at 18:14

## 2024-05-28 RX ADMIN — SENNA PLUS 2 TABLET(S): 8.6 TABLET ORAL at 22:01

## 2024-05-28 RX ADMIN — HEPARIN SODIUM 5000 UNIT(S): 5000 INJECTION INTRAVENOUS; SUBCUTANEOUS at 18:14

## 2024-05-28 RX ADMIN — Medication 10 MILLIEQUIVALENT(S): at 18:13

## 2024-05-28 RX ADMIN — ATORVASTATIN CALCIUM 40 MILLIGRAM(S): 80 TABLET, FILM COATED ORAL at 22:02

## 2024-05-28 RX ADMIN — Medication 5 MILLIGRAM(S): at 22:01

## 2024-05-28 NOTE — ED ADULT NURSE NOTE - OBJECTIVE STATEMENT
Received pt in room 14A, 53 yr/o male A+OX4, ambulatory with walker at baseline. pt was sent to ED for admission for wound debridement on right heel. Received pt in room 14A, 53 yr/o male A+OX4, ambulatory with walker at baseline. pt was sent to ED for admission for wound debridement on right heel. wound is 9x8 and is black and red. PERRLA and facial symmetry noted. pt denies numbness, tingling, and weakness in peripheral extremities. pt is in afib on the monitor. denies chest pain and SOB, denies palpations. RR even and unlabored. abdomen is rounded, soft, nontender; denies nausea, vomiting, diarrhea, and constipation. pt has active and passive ROM of all extremities, no obvious joint deformities noted. skin is clean dry and intact. left upper are 22g placed, labs drawn and sent. pt presented to the ED with a right upper arm midline in place.

## 2024-05-28 NOTE — ED ADULT NURSE NOTE - CHIEF COMPLAINT QUOTE
biba ([facility]) for admission for right foot debridement.  patient unable to bear any weight on right lower extremity   he is alert / oriented x4.  patient has a mid line to [facility], received 7 days of Zosyn, completed.

## 2024-05-28 NOTE — ED ADULT NURSE REASSESSMENT NOTE - NS ED NURSE REASSESS COMMENT FT1
Report taken from Maria De Jesus LOVELACE. Patient is A&O4, denies SOB, CP or discomfort at this time. Awaiting transport to Room on 1E.

## 2024-05-28 NOTE — ED ADULT TRIAGE NOTE - CHIEF COMPLAINT QUOTE
biba ([facility]) for admission for right foot debridement.  patient unable to bear any weight on right lower extremity   he is alert / oriented x4. biba ([facility]) for admission for right foot debridement.  patient unable to bear any weight on right lower extremity   he is alert / oriented x4.  patient has a mid line to [facility], received 7 days of Zosyn, completed.

## 2024-05-28 NOTE — ED PROVIDER NOTE - CLINICAL SUMMARY MEDICAL DECISION MAKING FREE TEXT BOX
54yo M with afib (on eliquis), CAD, DM, presents with chronic right heel ulcer requiring debridement. pt st completed 7 day course of Zosyn yesterday. pt denies fever, chills, n/v/d, abd pain, cp, sob, dizziness, headache, neck or back pain. pt st has pain to right heel. pt reports chronic swelling to both legs/feet, [facility]   Gen: Awake, Alert, WD, WN, NAD  Head:  NC/AT  Eyes:  PERRL, EOMI, Conjunctiva pink, lids normal, no scleral icterus  ENT: OP clear, edentulous, moist mucus membranes  Neck: supple, nontender, trachea midline  Cardiac/CV:  S1 S2, RRR, no M/G/R  Respiratory/Pulm:  CTAB, good air movement, normal resp effort  Gastrointestinal/Abdomen:  Soft, nontender, nondistended, +BS, +chronic soft, non tender umbilical hernia, no rebound/guarding  Pelvis: stable, nontender  Back:  no CVAT, no MLT  Ext:  +bipedal 3+ edema  Skin: +large necrotic right heel ulcer ~6x 5cm no rash, no vesicles, no petechiae, no ecchymosis  Neuro:  AAOx3, sensation intact, motor 5/5 x 4 extremities,  speech clear   labs, xrays, wound care/podiatry consult, admit for wound debridement  infected/necrotic diabetic wound ulcer

## 2024-05-28 NOTE — ED ADULT NURSE NOTE - NSFALLHARMRISKINTERV_ED_ALL_ED
Assistance OOB with selected safe patient handling equipment if applicable/Assistance with ambulation/Communicate risk of Fall with Harm to all staff, patient, and family/Provide patient with walking aids/Provide visual cue: red socks, yellow wristband, yellow gown, etc/Reinforce activity limits and safety measures with patient and family/Bed in lowest position, wheels locked, appropriate side rails in place/Call bell, personal items and telephone in reach/Instruct patient to call for assistance before getting out of bed/chair/stretcher/Non-slip footwear applied when patient is off stretcher/[place] to call system/Physically safe environment - no spills, clutter or unnecessary equipment/Purposeful Proactive Rounding/Room/bathroom lighting operational, light cord in reach

## 2024-05-28 NOTE — PATIENT PROFILE ADULT - FALL HARM RISK - HARM RISK INTERVENTIONS

## 2024-05-28 NOTE — H&P ADULT - NSHPREVIEWOFSYSTEMS_GEN_ALL_CORE
· CONSTITUTIONAL: no fever and no chills.  · CARDIOVASCULAR: no chest pain and no edema.  · RESPIRATORY: no chest pain, no cough, and no shortness of breath.  · MUSCULOSKELETAL: - - -  · Musculoskeletal [+]: chronic wound R heel  · ROS STATEMENT: all other ROS negative except as per HPI

## 2024-05-28 NOTE — ED PROVIDER NOTE - OBJECTIVE STATEMENT
53-year-old male past medical history hypertension, diabetes, Afib, pulmonary embolism on Eliquis, BPH presents to emergency department from [facility] for admission/surgical intervention of chronic right heel ulcer.

## 2024-05-28 NOTE — PATIENT PROFILE ADULT - FUNCTIONAL ASSESSMENT - BASIC MOBILITY 6.
no
 2-calculated by average/Not able to assess (calculate score using [facility] averaging method)

## 2024-05-28 NOTE — PATIENT PROFILE ADULT - LIVING ENVIRONMENT
3 y.o. male, Balaji Dozier, presents with Mole   Mom reports that he has a mole by his left eye. Previous provider tried to freeze it without success. Mom reports that he does pull on it and wants it gone. It has bled before but none currently. Mom reports that he doesn't seem to process things sometimes. Mom reports that it is like he doesn't even understand what they are saying. Moved from [place] where they wanted to keep him in pre-k. Went to  here last year and the teacher was worried that he was zoned out, looking out the window. Mom reports that he started talking at 4 months of age saying hi. Unsure when he started saying sentences but mom reports always did well on his milestones. Plays well with peers. Has frequent cough, runny nose, and nasal congestion.     Review of Systems  Review of Systems   Constitutional:  Negative for activity change, appetite change and fever.   HENT:  Positive for congestion and rhinorrhea. Negative for sore throat.    Respiratory:  Positive for cough. Negative for shortness of breath and wheezing.    Gastrointestinal:  Negative for diarrhea, nausea and vomiting.   Genitourinary:  Negative for decreased urine volume and difficulty urinating.   Musculoskeletal:  Negative for arthralgias and myalgias.   Skin:  Negative for rash.   Psychiatric/Behavioral:  Positive for decreased concentration.       Objective:   Physical Exam  Vitals reviewed.   Constitutional:       General: He is active. He is not in acute distress.     Appearance: He is well-developed.   HENT:      Head: Normocephalic and atraumatic.      Nose: Nose normal.      Mouth/Throat:      Mouth: Mucous membranes are moist.      Pharynx: Oropharynx is clear.   Eyes:      General: Lids are normal. Allergic shiner present.      Conjunctiva/sclera: Conjunctivae normal.   Cardiovascular:      Rate and Rhythm: Normal rate and regular rhythm.      Pulses: Normal pulses.      Heart sounds: Normal heart sounds and S1  normal.   Pulmonary:      Effort: Pulmonary effort is normal. No respiratory distress.      Breath sounds: Normal breath sounds and air entry. No wheezing.   Skin:     General: Skin is warm.      Capillary Refill: Capillary refill takes less than 2 seconds.      Findings: Lesion (small raised mole on left upper cheek without erythema or excoriation) present. No rash.       Assessment:     3 y.o. male Balaji was seen today for mole.    Diagnoses and all orders for this visit:    Fleshy skin mole  -     Ambulatory referral/consult to Pediatric Dermatology; Future    Poor concentration    Seasonal allergic rhinitis, unspecified trigger  -     levocetirizine (XYZAL) 2.5 mg/5 mL solution; Take 5 mLs (2.5 mg total) by mouth every evening.      Plan:      1. Referral to derm for mole. Avoid scratching/picking.  2. Discussed with patient/parent symptomatic care, including over the counter medications if appropriate, and when to return to clinic. Handout provided.  3. Discussed that we start the process of ADHD evaluations at 4 years old. Return to clinic in 2 months for well check.    no

## 2024-05-28 NOTE — H&P ADULT - NSHPLABSRESULTS_GEN_ALL_CORE
05-28    140  |  103  |  7   ----------------------------<  133<H>  3.4<L>   |  32<H>  |  1.00    Ca    8.9      28 May 2024 11:43    TPro  7.1  /  Alb  2.3<L>  /  TBili  0.3  /  DBili  x   /  AST  20  /  ALT  17  /  AlkPhos  102  05-28                            11.6   10.99 )-----------( 348      ( 28 May 2024 11:43 )             38.0             LIVER FUNCTIONS - ( 28 May 2024 11:43 )  Alb: 2.3 g/dL / Pro: 7.1 g/dL / ALK PHOS: 102 U/L / ALT: 17 U/L / AST: 20 U/L / GGT: x             PT/INR - ( 28 May 2024 11:43 )   PT: 14.3 sec;   INR: 1.23 ratio         PTT - ( 28 May 2024 11:43 )  PTT:36.7 sec    Urinalysis Basic - ( 28 May 2024 11:43 )    Color: x / Appearance: x / SG: x / pH: x  Gluc: 133 mg/dL / Ketone: x  / Bili: x / Urobili: x   Blood: x / Protein: x / Nitrite: x   Leuk Esterase: x / RBC: x / WBC x   Sq Epi: x / Non Sq Epi: x / Bacteria: x

## 2024-05-28 NOTE — H&P ADULT - NSHPPHYSICALEXAM_GEN_ALL_CORE
· CONSTITUTIONAL: Well appearing, awake, alert, oriented to person, place, time/situation and in no apparent distress.  · CARDIAC: Normal rate, regular rhythm.  Heart sounds S1, S2.  No murmurs, rubs or gallops.  · RESPIRATORY: Breath sounds clear and equal bilaterally.  · MUSCULOSKELETAL: - - -  · MSUC EXT EXAM: chronic ulcer R heel, periwound erythema, purulent discharge  · MUSC VASC: no vascular compromise  pulses full and equal bilaterally

## 2024-05-28 NOTE — ED PROVIDER NOTE - EXTREMITY EXAM
Please return to the emergency department if you develop severe and persistent chest pain,  difficulty breathing, dizziness, leg swelling or if you are coughing up blood as these can be signs  of a medical emergency. Please call your doctor for a follow up appointment in 2-3 days to  determine the need for further testing. chronic ulcer R heel, periwound erythema, purulent discharge

## 2024-05-28 NOTE — H&P ADULT - HISTORY OF PRESENT ILLNESS
53-year-old male past medical history hypertension, diabetes, Afib, pulmonary embolism on Eliquis, BPH presents to emergency department from [facility] for admission/surgical intervention of chronic right heel ulcer. per podiatry

## 2024-05-28 NOTE — ED ADULT TRIAGE NOTE - BANDS:
Called and spoke to pt. He states that the balsalazide he thinks is working slowly but the digestion process after he eats is painful. He reports that he has cramping while digesting meals. He states that his BM are no longer liquid and are firmer than previously. Pt reported only has about a week left of balsalazide and if more prescriptions are sent he wants them sent to a pharmacy in [place] as he is traveling starting tomorrow. Routed to [facility] for medication direction.    Dicyclomine and balsalazide prescription was faxed to pt perferred pharmacy [facility] in [place]. Fax # [phone]   Ok to refill balsalazide and for his cramping you can send a rx for   Dicyclomine 10 mg po qid prn  120/3 refills  If symptoms persist, may increase to 20 mg      Patient called requesting antibiotics as stated in previous encounter   Patient is requesting the antibiotics to be prescribed and sent to the [facility] pharmacy that was mentioned at his appt on 7/24/23. He states the current medication is not working and his colitis is getting worse.    Please advise   Allergy; Do Not Use Extremity; No, Declined

## 2024-05-28 NOTE — CONSULT NOTE ADULT - NS ATTEND AMEND GEN_ALL_CORE FT
53M w/ PMHx of Afib, CAD s/p stents, CVA, DM, HTN, pulmonary embolism on Eliquis, osteomyelitis s/p debridement presenting presents to emergency department from [facility] for R heel ulcer debridement.    - Known  Afib, now -130s   - Monitor on remote telemetry  - Continue Cardizem 360 mg   - Continue Eliquis (though can hold for procedure if needed)   - Can add Metoprolol 25 mg PO BID and up titrate for rate control. Had pause last admission, so would use caution.     - 3/18/24 Technically difficult ECHO w/ normal LV size and function EF 50-55%, mildly reduced RV systolic function, mod dilated LA and RA, mod to sev MR, mild to mod TR, PASP 36  - Pt w/ BLE edema extending to thighs  - Would hold off on home diuretic and start Lasix 40 mg IV BID  - Monitor strict I/Os and daily weight  - Monitor S. creatinine bicarb and electrolytes while being diuresed.     - No evidence of any active ischemia   - Continue aspirin and statin     - Plan for heel debridement  - Pt has no active ischemia, decompensated heart failure, or severe stenotic valvular disease. he is now in  Afib RVR, would optimize rate control prior to clearance.     - Monitor and replete lytes, keep K>4, Mg>2.  - Will continue to follow.

## 2024-05-29 LAB
A1C WITH ESTIMATED AVERAGE GLUCOSE RESULT: 7.4 % — HIGH (ref 4–5.6)
CRP SERPL-MCNC: 15 MG/L — HIGH
ESTIMATED AVERAGE GLUCOSE: 166 MG/DL — HIGH (ref 68–114)
GLUCOSE BLDC GLUCOMTR-MCNC: 123 MG/DL — HIGH (ref 70–99)
GLUCOSE BLDC GLUCOMTR-MCNC: 126 MG/DL — HIGH (ref 70–99)
GLUCOSE BLDC GLUCOMTR-MCNC: 132 MG/DL — HIGH (ref 70–99)
GLUCOSE BLDC GLUCOMTR-MCNC: 178 MG/DL — HIGH (ref 70–99)
GLUCOSE BLDC GLUCOMTR-MCNC: 201 MG/DL — HIGH (ref 70–99)

## 2024-05-29 PROCEDURE — 99233 SBSQ HOSP IP/OBS HIGH 50: CPT

## 2024-05-29 PROCEDURE — 99221 1ST HOSP IP/OBS SF/LOW 40: CPT

## 2024-05-29 PROCEDURE — 93970 EXTREMITY STUDY: CPT | Mod: 26

## 2024-05-29 RX ADMIN — Medication 1000 UNIT(S): at 12:26

## 2024-05-29 RX ADMIN — Medication 25 MILLIGRAM(S): at 18:56

## 2024-05-29 RX ADMIN — Medication 5 MILLIGRAM(S): at 23:51

## 2024-05-29 RX ADMIN — Medication 5 MILLIGRAM(S): at 05:21

## 2024-05-29 RX ADMIN — Medication 1 GRAM(S): at 05:21

## 2024-05-29 RX ADMIN — OXYCODONE HYDROCHLORIDE 10 MILLIGRAM(S): 5 TABLET ORAL at 05:22

## 2024-05-29 RX ADMIN — OXYCODONE HYDROCHLORIDE 10 MILLIGRAM(S): 5 TABLET ORAL at 05:52

## 2024-05-29 RX ADMIN — LORATADINE 10 MILLIGRAM(S): 10 TABLET ORAL at 22:38

## 2024-05-29 RX ADMIN — TIOTROPIUM BROMIDE 2 PUFF(S): 18 CAPSULE ORAL; RESPIRATORY (INHALATION) at 05:21

## 2024-05-29 RX ADMIN — Medication 10 MILLIEQUIVALENT(S): at 05:22

## 2024-05-29 RX ADMIN — ATORVASTATIN CALCIUM 40 MILLIGRAM(S): 80 TABLET, FILM COATED ORAL at 22:38

## 2024-05-29 RX ADMIN — Medication 40 MILLIGRAM(S): at 15:12

## 2024-05-29 RX ADMIN — Medication 1 TABLET(S): at 12:27

## 2024-05-29 RX ADMIN — Medication 325 MILLIGRAM(S): at 05:22

## 2024-05-29 RX ADMIN — Medication 5 MILLIGRAM(S): at 15:12

## 2024-05-29 RX ADMIN — HEPARIN SODIUM 5000 UNIT(S): 5000 INJECTION INTRAVENOUS; SUBCUTANEOUS at 05:20

## 2024-05-29 RX ADMIN — GABAPENTIN 300 MILLIGRAM(S): 400 CAPSULE ORAL at 05:21

## 2024-05-29 RX ADMIN — Medication 25 MILLIGRAM(S): at 05:21

## 2024-05-29 RX ADMIN — Medication 10 MILLIGRAM(S): at 12:49

## 2024-05-29 RX ADMIN — Medication 5 MILLIGRAM(S): at 22:38

## 2024-05-29 RX ADMIN — SENNA PLUS 2 TABLET(S): 8.6 TABLET ORAL at 22:38

## 2024-05-29 RX ADMIN — FAMOTIDINE 20 MILLIGRAM(S): 10 INJECTION INTRAVENOUS at 12:27

## 2024-05-29 RX ADMIN — Medication 500 MILLIGRAM(S): at 12:26

## 2024-05-29 RX ADMIN — Medication 40 MILLIGRAM(S): at 05:21

## 2024-05-29 RX ADMIN — NALOXEGOL OXALATE 25 MILLIGRAM(S): 12.5 TABLET, FILM COATED ORAL at 12:49

## 2024-05-29 RX ADMIN — Medication 300 MILLIGRAM(S): at 05:21

## 2024-05-29 NOTE — PROVIDER CONTACT NOTE (OTHER) - ASSESSMENT
Pt is awake and alert, aggravated at situation that he did not have surgery, was moved to a different unit, has not eaten all day. Pt very frustrated.

## 2024-05-29 NOTE — DIETITIAN INITIAL EVALUATION ADULT - OTHER INFO
Pt is a "53-year-old male past medical history hypertension, diabetes, Afib, pulmonary embolism on Eliquis, BPH presents to emergency department from [facility] for admission/surgical intervention of chronic right heel ulcer. per podiatry."    Visited pt at bedside this am. Pt reports good appetite/intake. Tolerating diet well. Fish allergy noted. Denies chewing/swallowing difficulties. Denies N/V. No BMs thus far. CBW on admission 240#. Pt reports -240#. Denies significant wt changes. 1+ L ankle/leg edema noted. Skin: R heel diabetic ulcer, stage II R buttocks. Reviewed labs, K low (3.4); supplementation ordered. Pt with hx of DM, A1c of 7.4% obtained. Pt currently on consistent carbohydrate diet. Discussed current diet order. Brief verbal diet education provided. Pt declines written education material. Discussed importance of adequate protein intake for wound healing. Pt trialed NC Prosouce during last admission; disliked at that time. Pt agreeable to trial ensure max protein shake daily for additional protein. RD remains available and will continue to follow-up.

## 2024-05-29 NOTE — PROGRESS NOTE ADULT - SUBJECTIVE AND OBJECTIVE BOX
[facility] -- Brittany Luzt Pannella, Patel, Savella, Goodger, Cohen: Office # [phone]    Follow Up:  Cardiac clearance, A fib RVR    Subjective/Observations: Patient awake, alert, resting in bed. Denies chest pain, palpitations and dizziness. Denies any difficulty breathing. No orthopnea and PND. Tolerating room air. Continues with LE edema     REVIEW OF SYSTEMS: All other review of systems are negative unless indicated above    PAST MEDICAL & SURGICAL HISTORY:  Diabetes      Diabetes mellitus with no complication      Afib      Hypertension      Hypertension      BPH (benign prostatic hyperplasia)      Perforated gastric ulcer  s/p emergent ex-lap omentopexy and plication 6/2019      Pulmonary embolism      History of non-ST elevation myocardial infarction (NSTEMI)      Osteomyelitis  s/p debridement      CAD S/P percutaneous coronary angioplasty      Cerebrovascular accident      H/O abdominal surgery      Perforated gastric ulcer      Traumatic amputation of left foot, initial encounter    MEDICATIONS  (STANDING):  ascorbic acid 500 milliGRAM(s) Oral daily  atorvastatin 40 milliGRAM(s) Oral at bedtime  cholecalciferol 1000 Unit(s) Oral daily  dextrose 50% Injectable 25 Gram(s) IV Push once  dextrose 50% Injectable 25 Gram(s) IV Push once  dextrose 50% Injectable 12.5 Gram(s) IV Push once  dextrose Oral Gel 15 Gram(s) Oral once  diltiazem    milliGRAM(s) Oral daily  famotidine    Tablet 20 milliGRAM(s) Oral daily  ferrous    sulfate 325 milliGRAM(s) Oral two times a day  furosemide   Injectable 40 milliGRAM(s) IV Push two times a day  gabapentin 300 milliGRAM(s) Oral every 12 hours  glucagon  Injectable 1 milliGRAM(s) IntraMuscular once  heparin   Injectable 5000 Unit(s) SubCutaneous every 12 hours  insulin lispro (ADMELOG) corrective regimen sliding scale   SubCutaneous three times a day before meals  insulin lispro (ADMELOG) corrective regimen sliding scale   SubCutaneous at bedtime  loratadine 10 milliGRAM(s) Oral daily  melatonin 5 milliGRAM(s) Oral at bedtime  metoclopramide 5 milliGRAM(s) Oral three times a day  metoprolol tartrate 25 milliGRAM(s) Oral two times a day  multivitamin/minerals 1 Tablet(s) Oral daily  naloxegol 25 milliGRAM(s) Oral daily  oxybutynin XL 10 milliGRAM(s) Oral daily  oxyCODONE    IR 10 milliGRAM(s) Oral two times a day  polyethylene glycol 3350 17 Gram(s) Oral daily  potassium chloride    Tablet ER 10 milliEquivalent(s) Oral two times a day  senna 2 Tablet(s) Oral at bedtime  sucralfate 1 Gram(s) Oral two times a day  tiotropium 2.5 MICROgram(s) Inhaler 2 Puff(s) Inhalation daily    MEDICATIONS  (PRN):  acetaminophen     Tablet .. 650 milliGRAM(s) Oral every 6 hours PRN Temp greater or equal to 38C (100.4F), Mild Pain (1 - 3)  aluminum hydroxide/magnesium hydroxide/simethicone Suspension 30 milliLiter(s) Oral every 4 hours PRN Dyspepsia  ondansetron Injectable 4 milliGRAM(s) IV Push every 8 hours PRN Nausea and/or Vomiting  sodium chloride 0.65% Nasal 1 Spray(s) Both Nostrils three times a day PRN Nasal Congestion    Allergies  No Known Drug Allergies  fish (Hives)    Vital Signs Last 24 Hrs  T(C): 37.1 (29 May 2024 04:58), Max: 37.1 (28 May 2024 20:45)  T(F): 98.7 (29 May 2024 04:58), Max: 98.8 (28 May 2024 20:45)  HR: 93 (29 May 2024 04:58) (93 - 107)  BP: 123/89 (29 May 2024 04:58) (123/89 - 137/96)  BP(mean): --  RR: 18 (29 May 2024 04:58) (18 - 18)  SpO2: 91% (29 May 2024 04:58) (91% - 96%)    Parameters below as of 29 May 2024 04:58  Patient On (Oxygen Delivery Method): room air      I&O's Summary    28 May 2024 07:01  -  29 May 2024 07:00  --------------------------------------------------------  IN: 0 mL / OUT: 2800 mL / NET: -2800 mL          TELE: Not on telemetry   PHYSICAL EXAM:  Constitutional: NAD, awake and alert  HEENT: Moist Mucous Membranes, Anicteric  Pulmonary: Non-labored, breath sounds are clear bilaterally, No wheezing, rales or rhonchi  Cardiovascular: Regular, S1 and S2, No murmurs, No rubs, gallops or clicks  Gastrointestinal:  soft, nontender, nondistended   Lymph: +2 peripheral edema extending to thighs. No lymphadenopathy.   Skin: No visible rashes or ulcers.  Psych:  Mood & affect appropriate      LABS: All Labs Reviewed:                        11.6   10.99 )-----------( 348      ( 28 May 2024 11:43 )             38.0     28 May 2024 11:43    140    |  103    |  7      ----------------------------<  133    3.4     |  32     |  1.00     Ca    8.9        28 May 2024 11:43    TPro  7.1    /  Alb  2.3    /  TBili  0.3    /  DBili  x      /  AST  20     /  ALT  17     /  AlkPhos  102    28 May 2024 11:43   LIVER FUNCTIONS - ( 28 May 2024 11:43 )  Alb: 2.3 g/dL / Pro: 7.1 g/dL / ALK PHOS: 102 U/L / ALT: 17 U/L / AST: 20 U/L / GGT: x           PT/INR - ( 28 May 2024 11:43 )   PT: 14.3 sec;   INR: 1.23 ratio         PTT - ( 28 May 2024 11:43 )  PTT:36.7 sec  12 Lead ECG:   Ventricular Rate 127 BPM    QRS Duration 82 ms    Q-T Interval 276 ms    QTC Calculation(Bazett) 401 ms    R Axis 13 degrees    T Axis 5 degrees    Diagnosis Line Atrial fibrillation with rapid ventricular response with premature ventricular or aberrantly conducted complexes  Low voltage QRS  Cannot rule out Anterior infarct , age undetermined  Abnormal ECG    Confirmed by darien France ([id number]) on 5/28/2024 2:53:37 PM (05-28-24 @ 11:53)      TRANSTHORACIC ECHOCARDIOGRAM REPORT  ________________________________________________________________________________                                      _______       Pt. Name:       SARAH MORRISON Study Date:    3/18/2024  MRN:            [id number]         YOB: 1970  Accession #:    [id number]        Age:           53 years  Account#:       [id number]       Gender:        M  Heart Rate:                      Height:        74.02 in (188.00 cm)  Rhythm:                          Weight:        244.71 lb (111.00 kg)  Blood Pressure: 100/60 mmHg      BSA/BMI:       2.37 m² / 31.41 kg/m²  ________________________________________________________________________________________  Referring Physician:    [id number] Hill Brizuela  Interpreting Physician: Mary Maya MD  Primary Sonographer:    Mounika FELDMAN    CPT:               ECHO TTE WO CON COMP W DOPP - 35086.m  Indication(s):     Dyspnea, unspecified - R06.00  Procedure:         Transthoracic echocardiogram with 2-D, M-mode and complete                     spectral and color flow Doppler.  Ordering Location: [facility]  Admission Status:  Inpatient  Study Information: Image quality for this study is technically difficult.    _______________________________________________________________________________________     CONCLUSIONS:      1. Technically difficult image quality.   2. Left ventricular cavity is normal in size. Left ventricular wall thickness is normal. Left ventricular systolic function is normal with an ejection fraction visually estimated at 50 to 55 %.   3. Normal right ventricular cavity size, with normal wall thickness, and mildly reduced systolic function.   4. The left atrium is moderately dilated.   5. The right atrium is moderately dilated.   6. Moderate to severe mitral regurgitation.   7. Mild to moderate tricuspid regurgitation.   8. Estimated pulmonary artery systolic pressure is 36 mmHg, consistent with mild pulmonary hypertension.   9. The inferior vena cava is dilated measuring 2.40 cm in diameter, (dilated >2.1cm) with normal inspiratory collapse (normal >50%) consistent with mildly elevated right atrial pressure (~8, range 5-10mmHg).  10. Trace pericardial effusion.    ________________________________________________________________________________________  FINDINGS:     Left Ventricle:  The left ventricular cavity is normal in size. Left ventricular wall thickness is normal. Left ventricular systolic function is normal with an ejection fraction visually estimated at 50 to 55%.The left ventricular diastolic function is indeterminate.     Right Ventricle:  The right ventricular cavity is normal in size, with normal wall thickness and mildly reduced systolic function.     Left Atrium:  The left atrium is moderately dilated.     Right Atrium:  The right atrium is moderately dilated.     Aortic Valve:  The aortic valve anatomy cannot be determined with normal systolic excursion. There is no aortic valve stenosis.     Mitral Valve:  There is mild calcification of the mitralvalve annulus. There is moderate to severe mitral regurgitation.     Tricuspid Valve:  Structurally normal tricuspid valve with normal leaflet excursion. There is mild to moderate tricuspid regurgitation. Estimated pulmonary artery systolic pressure is 36 mmHg, consistent with mild pulmonary hypertension.     Pulmonic Valve:  The pulmonic valve was not well visualized. There is trace pulmonic regurgitation.     Pericardium:  There is a trace pericardial effusion.     Systemic Veins:  The inferiorvena cava is dilated measuring 2.40 cm in diameter, (dilated >2.1cm) with normal inspiratory collapse (normal >50%) consistent with mildly elevated right atrial pressure (~8, range 5-10mmHg).  ____________________________________________________________________  QUANTITATIVE DATA:  Left Ventricle Measurements: (Indexed to BSA)     IVSd (2D):   1.9 cm  LVPWd (2D):  1.5 cm  LVIDd (2D):  5.3 cm  LVIDs (2D):  3.9 cm  LV Mass:     413 g  174.4 g/m²  Visualized LV EF%: 50 to 55%     MV E Vmax:    1.15 m/s  e' lateral:   10.90 cm/s  e' medial:    10.50 cm/s  E/e' lateral: 10.56  E/e' medial:  12.00  E/e' Average: 10.76  MV DT:        137 msec    Aorta Measurements: (Normal range) (Indexed to BSA)     Sinuses of Valsalva: 3.50 cm (3.1 - 3.7 cm)       Left Atrium Measurements: (Indexed to BSA)  LA Diam 2D: 3.99 cm       LVOT / RVOT/ Qp/Qs Data: (Indexed to BSA)  LVOT Diameter: 2.22 cm    Mitral Valve Measurements:     MV E Vmax: 1.2 m/s       Tricuspid Valve Measurements:     TR Vmax:          2.6m/s  TR Peak Gradient: 27.7 mmHg  RA Pressure:      8 mmHg  PASP:             36 mmHg    ________________________________________________________________________________________  Electronically signed on 3/19/2024 at 11:27:59 AM by Mary Maya MD         *** Final ***

## 2024-05-29 NOTE — DIETITIAN INITIAL EVALUATION ADULT - CALCULATED FROM (ML/KG)
Your stress test was low risk and showed no evidence of ischemia/significant heart blockage. Please let me know if you have any questions or concerns as we can discuss them at our next follow up visit.     SANDOVAL Willis [phone]

## 2024-05-29 NOTE — DIETITIAN INITIAL EVALUATION ADULT - ORAL INTAKE PTA/DIET HISTORY
Pt admitted from [facility]. No diet hx found in transfer papers. Pt reports consuming 3 meals/day. NCS diet. Reports good appetite/intake PTA.

## 2024-05-29 NOTE — DIETITIAN INITIAL EVALUATION ADULT - ADD RECOMMEND
1) Continue diet as tolerated; honor food preferences as able to optimize po intake/tolerance  2) Recommend ensure max protein shake daily (150kcal, 30gm protein per 11 fl oz serving); MD made aware diet rx pending verification  3) Continue MVI and Vit C supplementation  4) Electrolyte replacement daily  5) Monitor po intake, diet tolerance, weight trends, labs, GI function, skin integrity

## 2024-05-29 NOTE — PROGRESS NOTE ADULT - SUBJECTIVE AND OBJECTIVE BOX
54 y/o seen in preop holding room in a. fib w RVR (140s->160s).    Hx of a. fib, CAD/angioplasty, MI, DM, CVA, PE.    Per notes, pt has hx of RVR but not to this degree; cardiologist recently added metoprolol in addition to cardizem.  Prior to coming to OR HR was mid 90s per floor vitals.  Pt did not receive 6pm dose of metoprolol.  This was given to pt in holding at about 7pm. Waited over 1 hr to see if improved HR.  Still remains 130s to high 150s. Pt without any complaints:  CP, SOB, etc.    Spoke w surgeon. he states this is not an emergency or urgent case.  No risk to life or limb, therefore surgeon and patient agree to postpone sx at this time.  Pt will go to remote tele and be re-evaluated by cardiologist and hospitalist.  Surgeon will reach out to cardiology and hospitalist.

## 2024-05-29 NOTE — DIETITIAN INITIAL EVALUATION ADULT - PERTINENT LABORATORY DATA
05-28    140  |  103  |  7   ----------------------------<  133<H>  3.4<L>   |  32<H>  |  1.00    Ca    8.9      28 May 2024 11:43    TPro  7.1  /  Alb  2.3<L>  /  TBili  0.3  /  DBili  x   /  AST  20  /  ALT  17  /  AlkPhos  102  05-28  POCT Blood Glucose.: 116 mg/dL (05-28-24 @ 21:28)  A1C with Estimated Average Glucose Result: 7.4 % (05-28-24 @ 11:45)  A1C with Estimated Average Glucose Result: 7.0 % (03-17-24 @ 06:41)  A1C with Estimated Average Glucose Result: 7.0 % (12-20-23 @ 08:20)

## 2024-05-29 NOTE — CONSULT NOTE ADULT - SUBJECTIVE AND OBJECTIVE BOX
Patient is a 53y old  Male who presents with a chief complaint of ankle wound (29 May 2024 10:58)      Reason For Consult: dm uncontrolled    HPI:  53-year-old male past medical history hypertension, diabetes, Afib, pulmonary embolism on Eliquis, BPH presents to emergency department from [facility] for admission/surgical intervention of chronic right heel ulcer. per podiatry (28 May 2024 17:33)      PAST MEDICAL & SURGICAL HISTORY:  Diabetes      Diabetes mellitus with no complication      Afib      Hypertension      BPH (benign prostatic hyperplasia)      Perforated gastric ulcer  s/p emergent ex-lap omentopexy and plication 6/2019      Pulmonary embolism      History of non-ST elevation myocardial infarction (NSTEMI)      Osteomyelitis  s/p debridement      CAD S/P percutaneous coronary angioplasty      Cerebrovascular accident      H/O abdominal surgery      Perforated gastric ulcer      Traumatic amputation of left foot, initial encounter          FAMILY HISTORY:  FH: pulmonary embolism  Mother    FH: coronary artery disease  Father    FH: stroke  Father          Social History:    MEDICATIONS  (STANDING):  ascorbic acid 500 milliGRAM(s) Oral daily  atorvastatin 40 milliGRAM(s) Oral at bedtime  cholecalciferol 1000 Unit(s) Oral daily  dextrose 50% Injectable 12.5 Gram(s) IV Push once  dextrose 50% Injectable 25 Gram(s) IV Push once  dextrose 50% Injectable 25 Gram(s) IV Push once  dextrose Oral Gel 15 Gram(s) Oral once  diltiazem    milliGRAM(s) Oral daily  famotidine    Tablet 20 milliGRAM(s) Oral daily  ferrous    sulfate 325 milliGRAM(s) Oral two times a day  furosemide   Injectable 40 milliGRAM(s) IV Push two times a day  gabapentin 300 milliGRAM(s) Oral every 12 hours  glucagon  Injectable 1 milliGRAM(s) IntraMuscular once  heparin   Injectable 5000 Unit(s) SubCutaneous every 12 hours  insulin lispro (ADMELOG) corrective regimen sliding scale   SubCutaneous three times a day before meals  insulin lispro (ADMELOG) corrective regimen sliding scale   SubCutaneous at bedtime  loratadine 10 milliGRAM(s) Oral daily  melatonin 5 milliGRAM(s) Oral at bedtime  metoclopramide 5 milliGRAM(s) Oral three times a day  metoprolol tartrate 25 milliGRAM(s) Oral two times a day  multivitamin/minerals 1 Tablet(s) Oral daily  naloxegol 25 milliGRAM(s) Oral daily  oxybutynin XL 10 milliGRAM(s) Oral daily  oxyCODONE    IR 10 milliGRAM(s) Oral two times a day  polyethylene glycol 3350 17 Gram(s) Oral daily  potassium chloride    Tablet ER 10 milliEquivalent(s) Oral two times a day  senna 2 Tablet(s) Oral at bedtime  sucralfate 1 Gram(s) Oral two times a day  tiotropium 2.5 MICROgram(s) Inhaler 2 Puff(s) Inhalation daily    MEDICATIONS  (PRN):  acetaminophen     Tablet .. 650 milliGRAM(s) Oral every 6 hours PRN Temp greater or equal to 38C (100.4F), Mild Pain (1 - 3)  aluminum hydroxide/magnesium hydroxide/simethicone Suspension 30 milliLiter(s) Oral every 4 hours PRN Dyspepsia  ondansetron Injectable 4 milliGRAM(s) IV Push every 8 hours PRN Nausea and/or Vomiting  sodium chloride 0.65% Nasal 1 Spray(s) Both Nostrils three times a day PRN Nasal Congestion        T(C): 37.1 (05-29-24 @ 04:58), Max: 37.1 (05-28-24 @ 20:45)  HR: 93 (05-29-24 @ 04:58) (93 - 107)  BP: 123/89 (05-29-24 @ 04:58) (123/89 - 137/96)  RR: 18 (05-29-24 @ 04:58) (18 - 18)  SpO2: 91% (05-29-24 @ 04:58) (91% - 96%)  Wt(kg): --    PHYSICAL EXAM:  CHEST/LUNG: Clear to percussion bilaterally; No rales, rhonchi, wheezing, or rubs  HEART: Regular rate and rhythm; No murmurs, rubs, or gallops  ABDOMEN: Soft, Nontender, Nondistended; Bowel sounds present  EXTREMITIES:  le foot dsg intact    CAPILLARY BLOOD GLUCOSE      POCT Blood Glucose.: 126 mg/dL (29 May 2024 09:56)  POCT Blood Glucose.: 116 mg/dL (28 May 2024 21:28)  POCT Blood Glucose.: 85 mg/dL (28 May 2024 18:11)                            11.6   10.99 )-----------( 348      ( 28 May 2024 11:43 )             38.0       CMP:  05-28 @ 11:43  SGPT 17  Albumin 2.3   Alk Phos 102   Anion Gap 5   SGOT 20   Total Bili 0.3   BUN 7   Calcium Total 8.9   CO2 32   Chloride 103   Creatinine 1.00   eGFR if AA --   eGFR if non AA --   Glucose 133   Potassium 3.4   Protein 7.1   Sodium 140      Thyroid Function Tests:      Diabetes Tests:       Radiology:               
[facility] - Brittayn Lutz, Zahra, Reynaldo, Gera, Román, Francesco; Office Number: [phone]    Initial Consult Note  CHIEF COMPLAINT: Patient is a 53y old  Male who presents with a chief complaint of  R heel ulcer    HPI: 53M w/ PMHx of Afib, CAD s/p stents, CVA, DM, HTN, pulmonary embolism on Eliquis,  osteomyelitis s/p debridement presenting  presents to emergency department from [facility] for R heel ulcer debridement. S/p zosyn x7 day course at facility. Being admitted for debridement    In ED, T(F): 98.6, HR:  (97 - 117), BP:  (134/91 - 139/87), RR: 18, SpO2:  (96% - 96%). Labs ur remarkable. EKG showed A fib RVR, PVC @ 127. Cardiology called for A fib RVR and Cardiac clearance.     Allergies  No Known Drug Allergies  fish (Hives)    Intolerances      PAST MEDICAL & SURGICAL HISTORY:  Diabetes      Diabetes mellitus with no complication      Afib      Hypertension      Hypertension      BPH (benign prostatic hyperplasia)      Perforated gastric ulcer  s/p emergent ex-lap omentopexy and plication 6/2019      Pulmonary embolism      History of non-ST elevation myocardial infarction (NSTEMI)      Osteomyelitis  s/p debridement      CAD S/P percutaneous coronary angioplasty      Cerebrovascular accident      H/O abdominal surgery      Perforated gastric ulcer      Traumatic amputation of left foot, initial encounter        MEDICATIONS  (STANDING):  ascorbic acid 500 milliGRAM(s) Oral daily  atorvastatin 40 milliGRAM(s) Oral at bedtime  cholecalciferol 1000 Unit(s) Oral daily  dextrose 50% Injectable 25 Gram(s) IV Push once  dextrose 50% Injectable 25 Gram(s) IV Push once  dextrose 50% Injectable 12.5 Gram(s) IV Push once  dextrose Oral Gel 15 Gram(s) Oral once  diltiazem    milliGRAM(s) Oral daily  famotidine    Tablet 20 milliGRAM(s) Oral daily  ferrous    sulfate 325 milliGRAM(s) Oral two times a day  gabapentin 300 milliGRAM(s) Oral every 12 hours  glucagon  Injectable 1 milliGRAM(s) IntraMuscular once  heparin   Injectable 5000 Unit(s) SubCutaneous every 12 hours  insulin lispro (ADMELOG) corrective regimen sliding scale   SubCutaneous three times a day before meals  insulin lispro (ADMELOG) corrective regimen sliding scale   SubCutaneous at bedtime  loratadine 10 milliGRAM(s) Oral daily  melatonin 5 milliGRAM(s) Oral at bedtime  metoclopramide 5 milliGRAM(s) Oral three times a day  multivitamin/minerals 1 Tablet(s) Oral daily  naloxegol 25 milliGRAM(s) Oral daily  oxybutynin XL 10 milliGRAM(s) Oral daily  oxyCODONE    IR 10 milliGRAM(s) Oral two times a day  polyethylene glycol 3350 17 Gram(s) Oral daily  potassium chloride    Tablet ER 10 milliEquivalent(s) Oral two times a day  senna 2 Tablet(s) Oral at bedtime  sucralfate 1 Gram(s) Oral two times a day  tiotropium 2.5 MICROgram(s) Inhaler 2 Puff(s) Inhalation daily  torsemide 20 milliGRAM(s) Oral two times a day    MEDICATIONS  (PRN):  acetaminophen     Tablet .. 650 milliGRAM(s) Oral every 6 hours PRN Temp greater or equal to 38C (100.4F), Mild Pain (1 - 3)  aluminum hydroxide/magnesium hydroxide/simethicone Suspension 30 milliLiter(s) Oral every 4 hours PRN Dyspepsia  ondansetron Injectable 4 milliGRAM(s) IV Push every 8 hours PRN Nausea and/or Vomiting  sodium chloride 0.65% Nasal 1 Spray(s) Both Nostrils three times a day PRN Nasal Congestion    FAMILY HISTORY:  FH: pulmonary embolism  Mother    FH: coronary artery disease  Father    FH: stroke  Father       No family history of acute MI or sudden cardiac death.    SOCIAL HISTORY: No active tobacco, ethanol, or drug abuse.    REVIEW OF SYSTEMS   All other review of systems is negative unless indicated above.    VITAL SIGNS:   Vital Signs Last 24 Hrs  T(C): 37 (28 May 2024 16:20), Max: 37 (28 May 2024 16:20)  T(F): 98.6 (28 May 2024 16:20), Max: 98.6 (28 May 2024 16:20)  HR: 97 (28 May 2024 16:20) (97 - 117)  BP: 134/91 (28 May 2024 16:20) (134/91 - 139/87)  BP(mean): --  RR: 18 (28 May 2024 16:20) (18 - 20)  SpO2: 96% (28 May 2024 16:20) (96% - 96%)    Parameters below as of 28 May 2024 16:20  Patient On (Oxygen Delivery Method): room air        Physical Exam:  Constitutional: NAD, awake and alert  HEENT: Moist Mucous Membranes, Anicteric  Pulmonary: Non-labored, breath sounds are clear bilaterally, No wheezing, rales or rhonchi  Cardiovascular: Regular, S1 and S2, No murmurs, No rubs, gallops or clicks  Gastrointestinal: Bowel Sounds present, soft, nontender.   Lymph: No peripheral edema. No lymphadenopathy.  Skin: No visible rashes or ulcers.  Psych:  Mood & affect appropriate    I&O's Summary      LABS: All Labs Reviewed:                        11.6   10.99 )-----------( 348      ( 28 May 2024 11:43 )             38.0     28 May 2024 11:43    140    |  103    |  7      ----------------------------<  133    3.4     |  32     |  1.00     Ca    8.9        28 May 2024 11:43    TPro  7.1    /  Alb  2.3    /  TBili  0.3    /  DBili  x      /  AST  20     /  ALT  17     /  AlkPhos  102    28 May 2024 11:43    PT/INR - ( 28 May 2024 11:43 )   PT: 14.3 sec;   INR: 1.23 ratio         PTT - ( 28 May 2024 11:43 )  PTT:36.7 secThyroid Stimulating Hormone, Serum: 1.75 uIU/mL (03-17-24 @ 06:41)      12 Lead ECG:   Ventricular Rate 127 BPM    QRS Duration 82 ms    Q-T Interval 276 ms    QTC Calculation(Bazett) 401 ms    R Axis 13 degrees    T Axis 5 degrees    Diagnosis Line Atrial fibrillation with rapid ventricular response with premature ventricular or aberrantly conducted complexes  Low voltage QRS  Cannot rule out Anterior infarct , age undetermined  Abnormal ECG    Confirmed by darien France ([id number]) on 5/28/2024 2:53:37 PM (05-28-24 @ 11:53)      TRANSTHORACIC ECHOCARDIOGRAM REPORT  ________________________________________________________________________________                                      _______       Pt. Name:       SARAH MORRISON Study Date:    3/18/2024  MRN:            [id number]         YOB: 1970  Accession #:    [id number]        Age:           53 years  Account#:       [id number]       Gender:        M  Heart Rate:                      Height:        74.02 in (188.00 cm)  Rhythm:                          Weight:        244.71 lb (111.00 kg)  Blood Pressure: 100/60 mmHg      BSA/BMI:       2.37 m² / 31.41 kg/m²  ________________________________________________________________________________________  Referring Physician:    [id number] Hill Brizuela  Interpreting Physician: Mary Maya MD  Primary Sonographer:    Mounika FELDMAN    CPT:               ECHO TTE WO CON COMP W DOPP - 75130.m  Indication(s):     Dyspnea, unspecified - R06.00  Procedure:         Transthoracic echocardiogram with 2-D, M-mode and complete                     spectral and color flow Doppler.  Ordering Location: [facility]  Admission Status:  Inpatient  Study Information: Image quality for this study is technically difficult.    _______________________________________________________________________________________     CONCLUSIONS:      1. Technically difficult image quality.   2. Left ventricular cavity is normal in size. Left ventricular wall thickness is normal. Left ventricular systolic function is normal with an ejection fraction visually estimated at 50 to 55 %.   3. Normal right ventricular cavity size, with normal wall thickness, and mildly reduced systolic function.   4. The left atrium is moderately dilated.   5. The right atrium is moderately dilated.   6. Moderate to severe mitral regurgitation.   7. Mild to moderate tricuspid regurgitation.   8. Estimated pulmonary artery systolic pressure is 36 mmHg, consistent with mild pulmonary hypertension.   9. The inferior vena cava is dilated measuring 2.40 cm in diameter, (dilated >2.1cm) with normal inspiratory collapse (normal >50%) consistent with mildly elevated right atrial pressure (~8, range 5-10mmHg).  10. Trace pericardial effusion.    ________________________________________________________________________________________  FINDINGS:     Left Ventricle:  The left ventricular cavity is normal in size. Left ventricular wall thickness is normal. Left ventricular systolic function is normal with an ejection fraction visually estimated at 50 to 55%.The left ventricular diastolic function is indeterminate.     Right Ventricle:  The right ventricular cavity is normal in size, with normal wall thickness and mildly reduced systolic function.     Left Atrium:  The left atrium is moderately dilated.     Right Atrium:  The right atrium is moderately dilated.     Aortic Valve:  The aortic valve anatomy cannot be determined with normal systolic excursion. There is no aortic valve stenosis.     Mitral Valve:  There is mild calcification of the mitralvalve annulus. There is moderate to severe mitral regurgitation.     Tricuspid Valve:  Structurally normal tricuspid valve with normal leaflet excursion. There is mild to moderate tricuspid regurgitation. Estimated pulmonary artery systolic pressure is 36 mmHg, consistent with mild pulmonary hypertension.     Pulmonic Valve:  The pulmonic valve was not well visualized. There is trace pulmonic regurgitation.     Pericardium:  There is a trace pericardial effusion.     Systemic Veins:  The inferiorvena cava is dilated measuring 2.40 cm in diameter, (dilated >2.1cm) with normal inspiratory collapse (normal >50%) consistent with mildly elevated right atrial pressure (~8, range 5-10mmHg).  ____________________________________________________________________  QUANTITATIVE DATA:  Left Ventricle Measurements: (Indexed to BSA)     IVSd (2D):   1.9 cm  LVPWd (2D):  1.5 cm  LVIDd (2D):  5.3 cm  LVIDs (2D):  3.9 cm  LV Mass:     413 g  174.4 g/m²  Visualized LV EF%: 50 to 55%     MV E Vmax:    1.15 m/s  e' lateral:   10.90 cm/s  e' medial:    10.50 cm/s  E/e' lateral: 10.56  E/e' medial:  12.00  E/e' Average: 10.76  MV DT:        137 msec    Aorta Measurements: (Normal range) (Indexed to BSA)     Sinuses of Valsalva: 3.50 cm (3.1 - 3.7 cm)       Left Atrium Measurements: (Indexed to BSA)  LA Diam 2D: 3.99 cm       LVOT / RVOT/ Qp/Qs Data: (Indexed to BSA)  LVOT Diameter: 2.22 cm    Mitral Valve Measurements:     MV E Vmax: 1.2 m/s       Tricuspid Valve Measurements:     TR Vmax:          2.6m/s  TR Peak Gradient: 27.7 mmHg  RA Pressure:      8 mmHg  PASP:             36 mmHg    ________________________________________________________________________________________  Electronically signed on 3/19/2024 at 11:27:59 AM by Mary Maya MD         *** Final ***  
Chart reviewed
[facility], Division of Infectious Diseases  DELGADO Rodriguez S. Shah, Y. Patel, G. [facility]  [phone]    SARAH MORRISON  53y, Male  [id number]    HPI--  HPI:  53-year-old male past medical history hypertension, diabetes, Afib, pulmonary embolism on Eliquis, BPH presents to emergency department from [facility] for admission/surgical intervention of chronic right heel ulcer. per podiatry (28 May 2024 17:33)    ID c/s for further evaluation    Active Medications--  acetaminophen     Tablet .. 650 milliGRAM(s) Oral every 6 hours PRN  aluminum hydroxide/magnesium hydroxide/simethicone Suspension 30 milliLiter(s) Oral every 4 hours PRN  ascorbic acid 500 milliGRAM(s) Oral daily  atorvastatin 40 milliGRAM(s) Oral at bedtime  cholecalciferol 1000 Unit(s) Oral daily  dextrose 50% Injectable 12.5 Gram(s) IV Push once  dextrose 50% Injectable 25 Gram(s) IV Push once  dextrose 50% Injectable 25 Gram(s) IV Push once  dextrose Oral Gel 15 Gram(s) Oral once  diltiazem    milliGRAM(s) Oral daily  famotidine    Tablet 20 milliGRAM(s) Oral daily  ferrous    sulfate 325 milliGRAM(s) Oral two times a day  furosemide   Injectable 40 milliGRAM(s) IV Push two times a day  gabapentin 300 milliGRAM(s) Oral every 12 hours  glucagon  Injectable 1 milliGRAM(s) IntraMuscular once  heparin   Injectable 5000 Unit(s) SubCutaneous every 12 hours  insulin lispro (ADMELOG) corrective regimen sliding scale   SubCutaneous three times a day before meals  insulin lispro (ADMELOG) corrective regimen sliding scale   SubCutaneous at bedtime  loratadine 10 milliGRAM(s) Oral daily  melatonin 5 milliGRAM(s) Oral at bedtime  metoclopramide 5 milliGRAM(s) Oral three times a day  metoprolol tartrate 25 milliGRAM(s) Oral two times a day  multivitamin/minerals 1 Tablet(s) Oral daily  naloxegol 25 milliGRAM(s) Oral daily  ondansetron Injectable 4 milliGRAM(s) IV Push every 8 hours PRN  oxybutynin XL 10 milliGRAM(s) Oral daily  oxyCODONE    IR 10 milliGRAM(s) Oral two times a day  polyethylene glycol 3350 17 Gram(s) Oral daily  potassium chloride    Tablet ER 10 milliEquivalent(s) Oral two times a day  senna 2 Tablet(s) Oral at bedtime  sodium chloride 0.65% Nasal 1 Spray(s) Both Nostrils three times a day PRN  sucralfate 1 Gram(s) Oral two times a day  tiotropium 2.5 MICROgram(s) Inhaler 2 Puff(s) Inhalation daily    Antimicrobials:     Immunologic:     ROS:  CONSTITUTIONAL: No fevers or chills. No weakness or headache. No weight changes.  EYES/ENT: No visual or hearing changes. No sore throat or throat pain .  NECK: No pain or stiffness  RESPIRATORY: No cough, wheezing, or hemoptysis. No shortness of breath  CARDIOVASCULAR: No chest pain or palpitations  GASTROINTESTINAL: No abdominal pain. No nausea or vomiting. No diarrhea or constipation.  GENITOURINARY: No dysuria, frequency or hematuria  NEUROLOGICAL: No numbness or weakness  SKIN: No itching or rashes  PSYCHIATRIC: Pleasant. Appropriate affect    Allergies: No Known Drug Allergies  fish (Hives)    PMH -- No pertinent past medical history    Diabetes    No pertinent past medical history    Diabetes mellitus with no complication    Afib    Hypertension    BPH (benign prostatic hyperplasia)    Perforated gastric ulcer    Pulmonary embolism    History of non-ST elevation myocardial infarction (NSTEMI)    Osteomyelitis    CAD S/P percutaneous coronary angioplasty    Cerebrovascular accident      PSH -- No significant past surgical history    No significant past surgical history    H/O abdominal surgery    Perforated gastric ulcer    Traumatic amputation of left foot, initial encounter      FH -- No pertinent family history in first degree relatives    No pertinent family history in first degree relatives    FH: pulmonary embolism    FH: coronary artery disease    FH: stroke      Social History --  EtOH: denies   Tobacco: denies   Drug Use: denies     Travel/Environmental/Occupational History:    Physical Exam--  Vital Signs Last 24 Hrs  T(F): 98.7 (29 May 2024 04:58), Max: 98.8 (28 May 2024 20:45)  HR: 93 (29 May 2024 04:58) (93 - 107)  BP: 123/89 (29 May 2024 04:58) (123/89 - 137/96)  RR: 18 (29 May 2024 04:58) (18 - 18)  SpO2: 91% (29 May 2024 04:58) (91% - 96%)  General: nontoxic-appearing, no acute distress  HEENT: NC/AT, EOMI,  Lungs: Clear bilaterally without rales, wheezing or rhonchi  Heart: Regular rate and rhythm. No murmur, rub or gallop.  Abdomen: Soft. Nondistended. Nontender.   Extremities: No cyanosis or clubbing. No edema.   Skin: Warm. Dry. Good turgor.     Laboratory & Imaging Data:  CBC:                       11.6   10.99 )-----------( 348      ( 28 May 2024 11:43 )             38.0     CMP: 05-28    140  |  103  |  7   ----------------------------<  133<H>  3.4<L>   |  32<H>  |  1.00    Ca    8.9      28 May 2024 11:43    TPro  7.1  /  Alb  2.3<L>  /  TBili  0.3  /  DBili  x   /  AST  20  /  ALT  17  /  AlkPhos  102  05-28    LIVER FUNCTIONS - ( 28 May 2024 11:43 )  Alb: 2.3 g/dL / Pro: 7.1 g/dL / ALK PHOS: 102 U/L / ALT: 17 U/L / AST: 20 U/L / GGT: x           Urinalysis Basic - ( 28 May 2024 11:43 )    Color: x / Appearance: x / SG: x / pH: x  Gluc: 133 mg/dL / Ketone: x  / Bili: x / Urobili: x   Blood: x / Protein: x / Nitrite: x   Leuk Esterase: x / RBC: x / WBC x   Sq Epi: x / Non Sq Epi: x / Bacteria: x        Microbiology: reviewed        Radiology: reviewed    < from: US Duplex Venous Lower Ext Complete, Bilateral (05.29.24 @ 09:10) >    ACC: [id number] EXAM:  US DPLX LWR EXT VEINS COMPL BI   ORDERED BY: HANNAH REDDY     PROCEDURE DATE:  05/29/2024          INTERPRETATION:  CLINICAL INFORMATION: Lower extremity swelling    COMPARISON: None available.    TECHNIQUE: Duplex sonography of the BILATERAL LOWER extremity veins with   color and spectral Doppler, with and without compression.    FINDINGS: There is edema within the superficial soft tissues of both the   right and left lower extremities.    The right and the left common femoral, femoral and popliteal veins are   patent and free of thrombus.    The right and left calf veins were not diagnostically imaged.    IMPRESSION:    No evidence of deep venous thrombosis in either lower extremity.            --- End of Report ---            SHARON BARTLETT MD; Attending Interventional Radiologist  This document has been electronically signed. May 29 2024  9:42AM    < end of copied text >  
HPI:  53-year-old male seen for right heel infected wound.  Patient was previously seen at the wound care center and was advised regarding the need to go to the hospital for antibiotics and surgical intervention.  Patient relates that he is doing well and denies any other complaints at this time.      PAST MEDICAL & SURGICAL HISTORY:  Diabetes      Diabetes mellitus with no complication      Afib      Hypertension      BPH (benign prostatic hyperplasia)      Perforated gastric ulcer  s/p emergent ex-lap omentopexy and plication 6/2019      Pulmonary embolism      History of non-ST elevation myocardial infarction (NSTEMI)      Osteomyelitis  s/p debridement      CAD S/P percutaneous coronary angioplasty      Cerebrovascular accident      H/O abdominal surgery      Perforated gastric ulcer      Traumatic amputation of left foot, initial encounter          Allergies    No Known Drug Allergies  fish (Hives)    Intolerances        MEDICATIONS  (STANDING):  ascorbic acid 500 milliGRAM(s) Oral daily  atorvastatin 40 milliGRAM(s) Oral at bedtime  cholecalciferol 1000 Unit(s) Oral daily  dextrose 50% Injectable 12.5 Gram(s) IV Push once  dextrose 50% Injectable 25 Gram(s) IV Push once  dextrose 50% Injectable 25 Gram(s) IV Push once  dextrose Oral Gel 15 Gram(s) Oral once  diltiazem    milliGRAM(s) Oral daily  famotidine    Tablet 20 milliGRAM(s) Oral daily  ferrous    sulfate 325 milliGRAM(s) Oral two times a day  furosemide   Injectable 40 milliGRAM(s) IV Push two times a day  gabapentin 300 milliGRAM(s) Oral every 12 hours  glucagon  Injectable 1 milliGRAM(s) IntraMuscular once  heparin   Injectable 5000 Unit(s) SubCutaneous every 12 hours  insulin lispro (ADMELOG) corrective regimen sliding scale   SubCutaneous three times a day before meals  insulin lispro (ADMELOG) corrective regimen sliding scale   SubCutaneous at bedtime  loratadine 10 milliGRAM(s) Oral daily  melatonin 5 milliGRAM(s) Oral at bedtime  metoclopramide 5 milliGRAM(s) Oral three times a day  metoprolol tartrate 25 milliGRAM(s) Oral two times a day  multivitamin/minerals 1 Tablet(s) Oral daily  naloxegol 25 milliGRAM(s) Oral daily  oxybutynin XL 10 milliGRAM(s) Oral daily  oxyCODONE    IR 10 milliGRAM(s) Oral two times a day  polyethylene glycol 3350 17 Gram(s) Oral daily  potassium chloride    Tablet ER 10 milliEquivalent(s) Oral two times a day  senna 2 Tablet(s) Oral at bedtime  sucralfate 1 Gram(s) Oral two times a day  tiotropium 2.5 MICROgram(s) Inhaler 2 Puff(s) Inhalation daily    MEDICATIONS  (PRN):  acetaminophen     Tablet .. 650 milliGRAM(s) Oral every 6 hours PRN Temp greater or equal to 38C (100.4F), Mild Pain (1 - 3)  aluminum hydroxide/magnesium hydroxide/simethicone Suspension 30 milliLiter(s) Oral every 4 hours PRN Dyspepsia  ondansetron Injectable 4 milliGRAM(s) IV Push every 8 hours PRN Nausea and/or Vomiting  sodium chloride 0.65% Nasal 1 Spray(s) Both Nostrils three times a day PRN Nasal Congestion      Social History:  long term reasident (28 May 2024 17:33)      FAMILY HISTORY:  FH: pulmonary embolism  Mother    FH: coronary artery disease  Father    FH: stroke  Father        Vital Signs Last 24 Hrs  T(C): 37.1 (29 May 2024 04:58), Max: 37.1 (28 May 2024 20:45)  T(F): 98.7 (29 May 2024 04:58), Max: 98.8 (28 May 2024 20:45)  HR: 93 (29 May 2024 04:58) (93 - 107)  BP: 123/89 (29 May 2024 04:58) (123/89 - 137/96)  BP(mean): --  RR: 18 (29 May 2024 04:58) (18 - 18)  SpO2: 91% (29 May 2024 04:58) (91% - 96%)    Parameters below as of 29 May 2024 04:58  Patient On (Oxygen Delivery Method): room air        PHYSICAL EXAM:  Vascular: DP & PT faintly palpable to the right foot, Capillary refill 4 seconds  Neurological: Light touch sensation not intact bilaterally  Musculoskeletal: left foot s/p midfoot amputation, healed.  Dermatological: Right posterior heel ulceration down to skin, subcutaneous tissue, fat with necrotic and nonviable tissue, purulent and serosanguineous drainage and malodor.  Periwound erythema noted.                          11.6   10.99 )-----------( 348      ( 28 May 2024 11:43 )             38.0       05-28    140  |  103  |  7   ----------------------------<  133<H>  3.4<L>   |  32<H>  |  1.00    Ca    8.9      28 May 2024 11:43    TPro  7.1  /  Alb  2.3<L>  /  TBili  0.3  /  DBili  x   /  AST  20  /  ALT  17  /  AlkPhos  102  05-28      PT/INR - ( 28 May 2024 11:43 )   PT: 14.3 sec;   INR: 1.23 ratio         PTT - ( 28 May 2024 11:43 )  PTT:36.7 sec        Imaging: Right foot x-ray reveals soft tissue deficit of the posterior aspect of the heel

## 2024-05-29 NOTE — CONSULT NOTE ADULT - PROBLEM SELECTOR RECOMMENDATION 9
cont admelog corrective scale coverage qac/qhs  standing mdii/metformin on hold  cont cons cho diet  goal bg 100-180 in hosp setting
Patient examined and evaluated at this time.  Patient advised regarding the need for surgical intervention via debridement of the infected ulcer with bone biopsy of the calcaneus, with the possibility of a partial calcanectomy.  All questions answered to satisfaction patient verbalized understanding.  Patient understands that he remains high risk for infection, sepsis, limb loss, death.  Patient tentatively scheduled for surgical debridement of the right heel with bone biopsy, possible partial calcanectomy for tonight pending medical clearance.

## 2024-05-29 NOTE — PROVIDER CONTACT NOTE (OTHER) - SITUATION
Pt T/F OR holding in middle of RRT on unit. Pt had remote tele ordered, pt did not come up on tele monitor. Per tele tech Bradley there are no more tele boxes.

## 2024-05-29 NOTE — CHART NOTE - NSCHARTNOTEFT_GEN_A_CORE
53-year-old male past medical history hypertension, diabetes, Afib, pulmonary embolism on Eliquis, BPH presents to emergency department from [facility] for admission/surgical intervention of chronic right heel ulcer. Podiatry following, plan for OR later today. 53-year-old male past medical history hypertension, diabetes, Afib, pulmonary embolism on Eliquis, BPH presents to emergency department from [facility] for admission/surgical intervention of chronic right heel ulcer. Podiatry following, plan for OR later today.     Patient presented with right brachial midline from [facility], s/p IVB therapy. Has working IV access left AC. Right brachial midline d/c.

## 2024-05-29 NOTE — DIETITIAN INITIAL EVALUATION ADULT - PERTINENT MEDS FT
MEDICATIONS  (STANDING):  ascorbic acid 500 milliGRAM(s) Oral daily  atorvastatin 40 milliGRAM(s) Oral at bedtime  cholecalciferol 1000 Unit(s) Oral daily  dextrose 50% Injectable 12.5 Gram(s) IV Push once  dextrose 50% Injectable 25 Gram(s) IV Push once  dextrose 50% Injectable 25 Gram(s) IV Push once  dextrose Oral Gel 15 Gram(s) Oral once  diltiazem    milliGRAM(s) Oral daily  famotidine    Tablet 20 milliGRAM(s) Oral daily  ferrous    sulfate 325 milliGRAM(s) Oral two times a day  furosemide   Injectable 40 milliGRAM(s) IV Push two times a day  gabapentin 300 milliGRAM(s) Oral every 12 hours  glucagon  Injectable 1 milliGRAM(s) IntraMuscular once  heparin   Injectable 5000 Unit(s) SubCutaneous every 12 hours  insulin lispro (ADMELOG) corrective regimen sliding scale   SubCutaneous three times a day before meals  insulin lispro (ADMELOG) corrective regimen sliding scale   SubCutaneous at bedtime  loratadine 10 milliGRAM(s) Oral daily  melatonin 5 milliGRAM(s) Oral at bedtime  metoclopramide 5 milliGRAM(s) Oral three times a day  metoprolol tartrate 25 milliGRAM(s) Oral two times a day  multivitamin/minerals 1 Tablet(s) Oral daily  naloxegol 25 milliGRAM(s) Oral daily  oxybutynin XL 10 milliGRAM(s) Oral daily  oxyCODONE    IR 10 milliGRAM(s) Oral two times a day  polyethylene glycol 3350 17 Gram(s) Oral daily  potassium chloride    Tablet ER 10 milliEquivalent(s) Oral two times a day  senna 2 Tablet(s) Oral at bedtime  sucralfate 1 Gram(s) Oral two times a day  tiotropium 2.5 MICROgram(s) Inhaler 2 Puff(s) Inhalation daily    MEDICATIONS  (PRN):  acetaminophen     Tablet .. 650 milliGRAM(s) Oral every 6 hours PRN Temp greater or equal to 38C (100.4F), Mild Pain (1 - 3)  aluminum hydroxide/magnesium hydroxide/simethicone Suspension 30 milliLiter(s) Oral every 4 hours PRN Dyspepsia  ondansetron Injectable 4 milliGRAM(s) IV Push every 8 hours PRN Nausea and/or Vomiting  sodium chloride 0.65% Nasal 1 Spray(s) Both Nostrils three times a day PRN Nasal Congestion

## 2024-05-29 NOTE — CONSULT NOTE ADULT - CONSULT REASON
Ankle Wound
R foot ulcer, infected
Cardiac clearance, A fib RVR
dm uncontrolled
Right heel infected wound

## 2024-05-29 NOTE — CONSULT NOTE ADULT - ASSESSMENT
53M w/ PMHx of Afib, CAD s/p stents, CVA, DM, HTN, pulmonary embolism on Eliquis,  osteomyelitis s/p debridement presenting  presents to emergency department from [facility] for R heel ulcer debridement.    Afib RVR, CAD, HTN  - Known  Afib, now -130s   - Monitor on remote telemetry  - Continue Cardizem 360 mg   - Continue Eliquis   - Can add Metoprolol 25 mg PO BID and up titrate for rate control. Had pause last admission, so would use caution.     - 3/18/24 Technically difficult ECHO w/ normal LV size and function EF 50-55%, mildly reduced RV systolic function, mod dilated LA and RA, mod to sev MR, mild to mod TR, PASP 36  - Pt w/ BLE edema extending to thighs  - Would hold off on home diuretic and start Lasix 40 mg IV BID  - Monitor strict I/Os and daily weight  - Monitor S. creatinine bicarb and electrolytes while being diuresed.     - EKG showed A fib RVR, PVC @ 127.   - No evidence of any active ischemia   - Continue aspirin and statin     - BP stable and controlled     - Plan for heel debridement  - Pt has no active ischemia, decompensated heart failure, or severe stenotic valvular disease. he is now in  Afib RVR, would optimize rate control prior to clearance.     - Monitor and replete lytes, keep K>4, Mg>2.  - Will continue to follow.    Danny Arce, MS FNP, AGACNP  Nurse Practitioner- Cardiology   Please call on TEAMS              
Pt is a 53M w/ PMHx of Afib, CAD, DM presenting for R heel ulcer debridement.   S/p zosyn x7 day course at facility  Being admitted for debridement    Recommendations:   Can hold Abx prior to OR as pt clinically stable  --ok to proceed w/ standard [facility]-op Abx protocol  Please obtain cx and path  Appreciate podiatry/wound care  Will start on Abx following debridement  Further recs to follow pending above    D/w Dr. Brizuela  Full consult to follow  Please call with any immediate questions.  Anne-Marie Li M.D.  [facility] Division of Infectious Diseases [phone]  For after 5 P.M. and weekends, please call [phone]  
Pt is a 53M w/ PMHx of Afib, CAD, DM presenting for R heel ulcer debridement.   S/p zosyn x7 day course at facility  Being admitted for debridement    Recommendations:   Can hold Abx prior to OR as pt clinically stable  --ok to proceed w/ standard [place]-op Abx protocol  Please obtain cx and path  Appreciate podiatry/wound care  Will start on Abx following debridement  Further recs to follow pending above    Infectious Diseases will follow. Please call with any questions.  Anne-Marie Li M.D.  [facility] Division of Infectious Diseases [phone]  For after 5 P.M. and weekends, please call [phone]    For after 5 P.M. and weekends, please call [phone]  
Right heel infected ulcer

## 2024-05-29 NOTE — PROGRESS NOTE ADULT - SUBJECTIVE AND OBJECTIVE BOX
Patient is a 53y old  Male who presents with a chief complaint of ankle wound (29 May 2024 10:39)  comfortable, without complaints      INTERVAL HPI/OVERNIGHT EVENTS:  T(C): 37.1 (05-29-24 @ 04:58), Max: 37.1 (05-28-24 @ 20:45)  HR: 93 (05-29-24 @ 04:58) (93 - 107)  BP: 123/89 (05-29-24 @ 04:58) (123/89 - 137/96)  RR: 18 (05-29-24 @ 04:58) (18 - 18)  SpO2: 91% (05-29-24 @ 04:58) (91% - 96%)  Wt(kg): --  I&O's Summary    28 May 2024 07:01  -  29 May 2024 07:00  --------------------------------------------------------  IN: 0 mL / OUT: 2800 mL / NET: -2800 mL        LABS:                        11.6   10.99 )-----------( 348      ( 28 May 2024 11:43 )             38.0     05-28    140  |  103  |  7   ----------------------------<  133<H>  3.4<L>   |  32<H>  |  1.00    Ca    8.9      28 May 2024 11:43    TPro  7.1  /  Alb  2.3<L>  /  TBili  0.3  /  DBili  x   /  AST  20  /  ALT  17  /  AlkPhos  102  05-28    PT/INR - ( 28 May 2024 11:43 )   PT: 14.3 sec;   INR: 1.23 ratio         PTT - ( 28 May 2024 11:43 )  PTT:36.7 sec  Urinalysis Basic - ( 28 May 2024 11:43 )    Color: x / Appearance: x / SG: x / pH: x  Gluc: 133 mg/dL / Ketone: x  / Bili: x / Urobili: x   Blood: x / Protein: x / Nitrite: x   Leuk Esterase: x / RBC: x / WBC x   Sq Epi: x / Non Sq Epi: x / Bacteria: x      CAPILLARY BLOOD GLUCOSE      POCT Blood Glucose.: 126 mg/dL (29 May 2024 09:56)  POCT Blood Glucose.: 116 mg/dL (28 May 2024 21:28)  POCT Blood Glucose.: 85 mg/dL (28 May 2024 18:11)        Urinalysis Basic - ( 28 May 2024 11:43 )    Color: x / Appearance: x / SG: x / pH: x  Gluc: 133 mg/dL / Ketone: x  / Bili: x / Urobili: x   Blood: x / Protein: x / Nitrite: x   Leuk Esterase: x / RBC: x / WBC x   Sq Epi: x / Non Sq Epi: x / Bacteria: x        MEDICATIONS  (STANDING):  ascorbic acid 500 milliGRAM(s) Oral daily  atorvastatin 40 milliGRAM(s) Oral at bedtime  cholecalciferol 1000 Unit(s) Oral daily  dextrose 50% Injectable 12.5 Gram(s) IV Push once  dextrose 50% Injectable 25 Gram(s) IV Push once  dextrose 50% Injectable 25 Gram(s) IV Push once  dextrose Oral Gel 15 Gram(s) Oral once  diltiazem    milliGRAM(s) Oral daily  famotidine    Tablet 20 milliGRAM(s) Oral daily  ferrous    sulfate 325 milliGRAM(s) Oral two times a day  furosemide   Injectable 40 milliGRAM(s) IV Push two times a day  gabapentin 300 milliGRAM(s) Oral every 12 hours  glucagon  Injectable 1 milliGRAM(s) IntraMuscular once  heparin   Injectable 5000 Unit(s) SubCutaneous every 12 hours  insulin lispro (ADMELOG) corrective regimen sliding scale   SubCutaneous three times a day before meals  insulin lispro (ADMELOG) corrective regimen sliding scale   SubCutaneous at bedtime  loratadine 10 milliGRAM(s) Oral daily  melatonin 5 milliGRAM(s) Oral at bedtime  metoclopramide 5 milliGRAM(s) Oral three times a day  metoprolol tartrate 25 milliGRAM(s) Oral two times a day  multivitamin/minerals 1 Tablet(s) Oral daily  naloxegol 25 milliGRAM(s) Oral daily  oxybutynin XL 10 milliGRAM(s) Oral daily  oxyCODONE    IR 10 milliGRAM(s) Oral two times a day  polyethylene glycol 3350 17 Gram(s) Oral daily  potassium chloride    Tablet ER 10 milliEquivalent(s) Oral two times a day  senna 2 Tablet(s) Oral at bedtime  sucralfate 1 Gram(s) Oral two times a day  tiotropium 2.5 MICROgram(s) Inhaler 2 Puff(s) Inhalation daily    MEDICATIONS  (PRN):  acetaminophen     Tablet .. 650 milliGRAM(s) Oral every 6 hours PRN Temp greater or equal to 38C (100.4F), Mild Pain (1 - 3)  aluminum hydroxide/magnesium hydroxide/simethicone Suspension 30 milliLiter(s) Oral every 4 hours PRN Dyspepsia  ondansetron Injectable 4 milliGRAM(s) IV Push every 8 hours PRN Nausea and/or Vomiting  sodium chloride 0.65% Nasal 1 Spray(s) Both Nostrils three times a day PRN Nasal Congestion      REVIEW OF SYSTEMS:  CONSTITUTIONAL: No fever, weight loss, or fatigue  EYES: No eye pain, visual disturbances, or discharge  ENMT:  No difficulty hearing, tinnitus, vertigo; No sinus or throat pain  NECK: No pain or stiffness  RESPIRATORY: No cough, wheezing, chills or hemoptysis; No shortness of breath  CARDIOVASCULAR: No chest pain, palpitations, dizziness, or leg swelling  GASTROINTESTINAL: No abdominal or epigastric pain. No nausea, vomiting, or hematemesis; No diarrhea or constipation. No melena or hematochezia.  GENITOURINARY: No dysuria, frequency, hematuria, or incontinence  NEUROLOGICAL: No headaches, memory loss, loss of strength, numbness, or tremors  SKIN: No itching, burning, rashes, or lesions   LYMPH NODES: No enlarged glands  ENDOCRINE: No heat or cold intolerance; No hair loss  MUSCULOSKELETAL: No joint pain or swelling; No muscle, back, or extremity pain  PSYCHIATRIC: No depression, anxiety, mood swings, or difficulty sleeping  HEME/LYMPH: No easy bruising, or bleeding gums  ALLERY AND IMMUNOLOGIC: No hives or eczema    RADIOLOGY & ADDITIONAL TESTS:    Imaging Personally Reviewed:  [ ] YES  [ ] NO    Consultant(s) Notes Reviewed:  [ ] YES  [ ] NO    PHYSICAL EXAM:  GENERAL: NAD, well-groomed, well-developed  HEAD:  Atraumatic, Normocephalic  EYES: EOMI, PERRLA, conjunctiva and sclera clear  ENMT: No tonsillar erythema, exudates, or enlargement; Moist mucous membranes, Good dentition, No lesions  NECK: Supple, No JVD, Normal thyroid  NERVOUS SYSTEM:  Alert & Oriented X3, Good concentration; Motor Strength 5/5 B/L upper and lower extremities; DTRs 2+ intact and symmetric  CHEST/LUNG: Clear to percussion bilaterally; No rales, rhonchi, wheezing, or rubs  HEART: Regular rate and rhythm; No murmurs, rubs, or gallops  ABDOMEN: Soft, Nontender, Nondistended; Bowel sounds present  EXTREMITIES:  1-2+ edema bilateral LE  LYMPH: No lymphadenopathy noted  SKIN: No rashes or lesions    Care Discussed with Consultants/Other Providers [ x] YES  [ ] NO    advance care planning and advance directives discussed [x]YES  [ ]NO

## 2024-05-29 NOTE — PROGRESS NOTE ADULT - ASSESSMENT
53M w/ PMHx of Afib, CAD s/p stents, CVA, DM, HTN, pulmonary embolism on Eliquis,  osteomyelitis s/p debridement presenting  presents to emergency department from [facility] for R heel ulcer debridement.    Afib RVR, CAD, HTN  - Known  Afib, now -130s   - Continue Cardizem 360 mg   - Continue Eliquis   - Can add Metoprolol 25 mg PO BID and up titrate for rate control. Had pauses last admission, so would use caution.   - Rate controlled per flow sheets 90-100s     - 3/18/24 Technically difficult ECHO w/ normal LV size and function EF 50-55%, mildly reduced RV systolic function, mod dilated LA and RA, mod to sev MR, mild to mod TR, PASP 36  - Pt w/ BLE edema extending to thighs  - Continue Lasix 40 mg IV BID  - Monitor strict I/Os and daily weight    - EKG showed A fib RVR, PVC @ 127.   - No evidence of any active ischemia   - Continue aspirin and statin     - BP stable and controlled     - Plan for  surgical debridement of the right heel with bone biopsy, possible partial calcanectomy  - Pt has no active ischemia, decompensated heart failure, unstable arrythmia, or severe stenotic valvular disease. Patient is optimized from cardiovascular standpoint to proceed with planned procedure with routine hemodynamic monitoring.     - Monitor and replete lytes, keep K>4, Mg>2.  - Will continue to follow.    Danny Arce MS FNP, AGACNP  Nurse Practitioner- Cardiology   Please call on TEAMS

## 2024-05-30 DIAGNOSIS — I25.2 OLD MYOCARDIAL INFARCTION: ICD-10-CM

## 2024-05-30 DIAGNOSIS — M86.671 OTHER CHRONIC OSTEOMYELITIS, RIGHT ANKLE AND FOOT: ICD-10-CM

## 2024-05-30 DIAGNOSIS — D64.9 ANEMIA, UNSPECIFIED: ICD-10-CM

## 2024-05-30 DIAGNOSIS — K59.00 CONSTIPATION, UNSPECIFIED: ICD-10-CM

## 2024-05-30 DIAGNOSIS — Z79.84 LONG TERM (CURRENT) USE OF ORAL HYPOGLYCEMIC DRUGS: ICD-10-CM

## 2024-05-30 DIAGNOSIS — E87.6 HYPOKALEMIA: ICD-10-CM

## 2024-05-30 DIAGNOSIS — Z79.899 OTHER LONG TERM (CURRENT) DRUG THERAPY: ICD-10-CM

## 2024-05-30 DIAGNOSIS — E11.69 TYPE 2 DIABETES MELLITUS WITH OTHER SPECIFIED COMPLICATION: ICD-10-CM

## 2024-05-30 DIAGNOSIS — Z89.432 ACQUIRED ABSENCE OF LEFT FOOT: ICD-10-CM

## 2024-05-30 DIAGNOSIS — E11.59 TYPE 2 DIABETES MELLITUS WITH OTHER CIRCULATORY COMPLICATIONS: ICD-10-CM

## 2024-05-30 DIAGNOSIS — L97.414 NON-PRESSURE CHRONIC ULCER OF RIGHT HEEL AND MIDFOOT WITH NECROSIS OF BONE: ICD-10-CM

## 2024-05-30 DIAGNOSIS — Z87.440 PERSONAL HISTORY OF URINARY (TRACT) INFECTIONS: ICD-10-CM

## 2024-05-30 DIAGNOSIS — N40.1 BENIGN PROSTATIC HYPERPLASIA WITH LOWER URINARY TRACT SYMPTOMS: ICD-10-CM

## 2024-05-30 DIAGNOSIS — Z91.013 ALLERGY TO SEAFOOD: ICD-10-CM

## 2024-05-30 DIAGNOSIS — Z79.01 LONG TERM (CURRENT) USE OF ANTICOAGULANTS: ICD-10-CM

## 2024-05-30 DIAGNOSIS — E56.9 VITAMIN DEFICIENCY, UNSPECIFIED: ICD-10-CM

## 2024-05-30 DIAGNOSIS — Z86.73 PERSONAL HISTORY OF TRANSIENT ISCHEMIC ATTACK (TIA), AND CEREBRAL INFARCTION WITHOUT RESIDUAL DEFICITS: ICD-10-CM

## 2024-05-30 DIAGNOSIS — E11.40 TYPE 2 DIABETES MELLITUS WITH DIABETIC NEUROPATHY, UNSPECIFIED: ICD-10-CM

## 2024-05-30 DIAGNOSIS — Z87.891 PERSONAL HISTORY OF NICOTINE DEPENDENCE: ICD-10-CM

## 2024-05-30 LAB
ALBUMIN SERPL ELPH-MCNC: 2.4 G/DL — LOW (ref 3.3–5)
ALP SERPL-CCNC: 108 U/L — SIGNIFICANT CHANGE UP (ref 40–120)
ALT FLD-CCNC: 13 U/L — SIGNIFICANT CHANGE UP (ref 12–78)
ANION GAP SERPL CALC-SCNC: 4 MMOL/L — LOW (ref 5–17)
AST SERPL-CCNC: 12 U/L — LOW (ref 15–37)
BILIRUB SERPL-MCNC: 0.5 MG/DL — SIGNIFICANT CHANGE UP (ref 0.2–1.2)
BUN SERPL-MCNC: 7 MG/DL — SIGNIFICANT CHANGE UP (ref 7–23)
CALCIUM SERPL-MCNC: 8.9 MG/DL — SIGNIFICANT CHANGE UP (ref 8.5–10.1)
CHLORIDE SERPL-SCNC: 106 MMOL/L — SIGNIFICANT CHANGE UP (ref 96–108)
CO2 SERPL-SCNC: 32 MMOL/L — HIGH (ref 22–31)
CREAT SERPL-MCNC: 0.85 MG/DL — SIGNIFICANT CHANGE UP (ref 0.5–1.3)
EGFR: 103 ML/MIN/1.73M2 — SIGNIFICANT CHANGE UP
GLUCOSE BLDC GLUCOMTR-MCNC: 167 MG/DL — HIGH (ref 70–99)
GLUCOSE BLDC GLUCOMTR-MCNC: 184 MG/DL — HIGH (ref 70–99)
GLUCOSE BLDC GLUCOMTR-MCNC: 230 MG/DL — HIGH (ref 70–99)
GLUCOSE BLDC GLUCOMTR-MCNC: 248 MG/DL — HIGH (ref 70–99)
GLUCOSE SERPL-MCNC: 185 MG/DL — HIGH (ref 70–99)
HCT VFR BLD CALC: 39.8 % — SIGNIFICANT CHANGE UP (ref 39–50)
HGB BLD-MCNC: 12.6 G/DL — LOW (ref 13–17)
MCHC RBC-ENTMCNC: 30.3 PG — SIGNIFICANT CHANGE UP (ref 27–34)
MCHC RBC-ENTMCNC: 31.7 GM/DL — LOW (ref 32–36)
MCV RBC AUTO: 95.7 FL — SIGNIFICANT CHANGE UP (ref 80–100)
NRBC # BLD: 0 /100 WBCS — SIGNIFICANT CHANGE UP (ref 0–0)
PLATELET # BLD AUTO: 329 K/UL — SIGNIFICANT CHANGE UP (ref 150–400)
POTASSIUM SERPL-MCNC: 3.2 MMOL/L — LOW (ref 3.5–5.3)
POTASSIUM SERPL-SCNC: 3.2 MMOL/L — LOW (ref 3.5–5.3)
PROT SERPL-MCNC: 7 G/DL — SIGNIFICANT CHANGE UP (ref 6–8.3)
RBC # BLD: 4.16 M/UL — LOW (ref 4.2–5.8)
RBC # FLD: 14.7 % — HIGH (ref 10.3–14.5)
SODIUM SERPL-SCNC: 142 MMOL/L — SIGNIFICANT CHANGE UP (ref 135–145)
WBC # BLD: 9.99 K/UL — SIGNIFICANT CHANGE UP (ref 3.8–10.5)
WBC # FLD AUTO: 9.99 K/UL — SIGNIFICANT CHANGE UP (ref 3.8–10.5)

## 2024-05-30 PROCEDURE — 99233 SBSQ HOSP IP/OBS HIGH 50: CPT

## 2024-05-30 RX ORDER — OXYBUTYNIN CHLORIDE 5 MG
10 TABLET ORAL
Refills: 0 | Status: DISCONTINUED | OUTPATIENT
Start: 2024-05-30 | End: 2024-06-04

## 2024-05-30 RX ORDER — DIGOXIN 250 MCG
500 TABLET ORAL ONCE
Refills: 0 | Status: COMPLETED | OUTPATIENT
Start: 2024-05-30 | End: 2024-05-30

## 2024-05-30 RX ORDER — DILTIAZEM HCL 120 MG
90 CAPSULE, EXT RELEASE 24 HR ORAL EVERY 6 HOURS
Refills: 0 | Status: DISCONTINUED | OUTPATIENT
Start: 2024-05-31 | End: 2024-06-04

## 2024-05-30 RX ORDER — METOPROLOL TARTRATE 50 MG
25 TABLET ORAL EVERY 6 HOURS
Refills: 0 | Status: DISCONTINUED | OUTPATIENT
Start: 2024-05-30 | End: 2024-06-04

## 2024-05-30 RX ORDER — DIGOXIN 250 MCG
250 TABLET ORAL EVERY 6 HOURS
Refills: 0 | Status: COMPLETED | OUTPATIENT
Start: 2024-05-30 | End: 2024-05-31

## 2024-05-30 RX ADMIN — TIOTROPIUM BROMIDE 2 PUFF(S): 18 CAPSULE ORAL; RESPIRATORY (INHALATION) at 05:44

## 2024-05-30 RX ADMIN — Medication 2: at 08:38

## 2024-05-30 RX ADMIN — Medication 10 MILLIGRAM(S): at 11:31

## 2024-05-30 RX ADMIN — Medication 5 MILLIGRAM(S): at 05:44

## 2024-05-30 RX ADMIN — Medication 25 MILLIGRAM(S): at 18:06

## 2024-05-30 RX ADMIN — LORATADINE 10 MILLIGRAM(S): 10 TABLET ORAL at 22:02

## 2024-05-30 RX ADMIN — ATORVASTATIN CALCIUM 40 MILLIGRAM(S): 80 TABLET, FILM COATED ORAL at 22:01

## 2024-05-30 RX ADMIN — Medication 40 MILLIGRAM(S): at 13:19

## 2024-05-30 RX ADMIN — Medication 250 MICROGRAM(S): at 18:07

## 2024-05-30 RX ADMIN — Medication 325 MILLIGRAM(S): at 05:45

## 2024-05-30 RX ADMIN — Medication 25 MILLIGRAM(S): at 11:30

## 2024-05-30 RX ADMIN — Medication 1: at 12:33

## 2024-05-30 RX ADMIN — Medication 1 GRAM(S): at 05:45

## 2024-05-30 RX ADMIN — Medication 500 MILLIGRAM(S): at 11:30

## 2024-05-30 RX ADMIN — OXYCODONE HYDROCHLORIDE 10 MILLIGRAM(S): 5 TABLET ORAL at 18:06

## 2024-05-30 RX ADMIN — GABAPENTIN 300 MILLIGRAM(S): 400 CAPSULE ORAL at 18:06

## 2024-05-30 RX ADMIN — Medication 1 GRAM(S): at 18:06

## 2024-05-30 RX ADMIN — Medication 5 MILLIGRAM(S): at 13:18

## 2024-05-30 RX ADMIN — Medication 1000 UNIT(S): at 11:30

## 2024-05-30 RX ADMIN — Medication 10 MILLIEQUIVALENT(S): at 05:46

## 2024-05-30 RX ADMIN — Medication 5 MILLIGRAM(S): at 22:02

## 2024-05-30 RX ADMIN — HEPARIN SODIUM 5000 UNIT(S): 5000 INJECTION INTRAVENOUS; SUBCUTANEOUS at 18:07

## 2024-05-30 RX ADMIN — Medication 40 MILLIGRAM(S): at 05:46

## 2024-05-30 RX ADMIN — Medication 1 TABLET(S): at 11:30

## 2024-05-30 RX ADMIN — Medication 500 MICROGRAM(S): at 12:33

## 2024-05-30 RX ADMIN — Medication 325 MILLIGRAM(S): at 18:07

## 2024-05-30 RX ADMIN — SENNA PLUS 2 TABLET(S): 8.6 TABLET ORAL at 22:01

## 2024-05-30 RX ADMIN — Medication 25 MILLIGRAM(S): at 05:45

## 2024-05-30 RX ADMIN — GABAPENTIN 300 MILLIGRAM(S): 400 CAPSULE ORAL at 05:45

## 2024-05-30 RX ADMIN — OXYCODONE HYDROCHLORIDE 10 MILLIGRAM(S): 5 TABLET ORAL at 05:45

## 2024-05-30 RX ADMIN — FAMOTIDINE 20 MILLIGRAM(S): 10 INJECTION INTRAVENOUS at 11:30

## 2024-05-30 RX ADMIN — Medication 300 MILLIGRAM(S): at 05:45

## 2024-05-30 RX ADMIN — OXYCODONE HYDROCHLORIDE 10 MILLIGRAM(S): 5 TABLET ORAL at 06:15

## 2024-05-30 RX ADMIN — Medication 10 MILLIEQUIVALENT(S): at 18:06

## 2024-05-30 RX ADMIN — Medication 10 MILLIGRAM(S): at 18:06

## 2024-05-30 RX ADMIN — Medication 5 MILLIGRAM(S): at 22:01

## 2024-05-30 RX ADMIN — NALOXEGOL OXALATE 25 MILLIGRAM(S): 12.5 TABLET, FILM COATED ORAL at 11:31

## 2024-05-30 NOTE — HISTORY OF PRESENT ILLNESS
[FreeTextEntry1] : Pt has a pressure ulcer on his right heel that was biopsied and debrided on 3/26/24 while patient was hospitalized in [facility]. He is here to follow up and stated that wound care at the facility he lives at is sub-par - all they've been doing is putting Dakins on it and wrapping it in dry dressing and ACE bandage. Pt was placed on IV Zosyn for 7 days due to odor by MD at facility (Dr. Murray)

## 2024-05-30 NOTE — REVIEW OF SYSTEMS
[Skin Wound] : skin wound [Limb Weakness] : limb weakness [Easy Bleeding] : a tendency for easy bleeding [Fever] : no fever [Chills] : no chills [Eye Pain] : no eye pain [Earache] : no earache [Loss Of Hearing] : no hearing loss [Chest Pain] : no chest pain [Shortness Of Breath] : no shortness of breath [Abdominal Pain] : no abdominal pain [Anxiety] : no anxiety [FreeTextEntry5] : htn, hld diabetic cardio vascular disease  [FreeTextEntry9] : s/p left foot TMA w/Achilles tenotomy [de-identified] : left foot dfu3 , s/p TMA   , healed, left heel DFU 3 ,skin , subcutaneous , right heel pre trophic  [de-identified] : diabetic neuropathy [de-identified] : IDDM with neuropathy

## 2024-05-30 NOTE — PROGRESS NOTE ADULT - ASSESSMENT
53M w/ PMHx of Afib, CAD s/p stents, CVA, DM, HTN, pulmonary embolism on Eliquis,  osteomyelitis s/p debridement presenting  presents to emergency department from [facility] for R heel ulcer debridement.    Afib RVR, CAD, HTN  - Known  Afib, now RVR  - Continue Cardizem, changed to 90 mg PO Q6H  - Continue Eliquis   - Continue Metoprolol, change to 25 mg PO Q6H and up titrate for rate control.   - Given A fib RVR, would dig load with 0.5 mg IV x 1 followed by 0.25 mg IV Q6h x 2 doses   - Check dig level in 2 days   - Had pauses last admission, so would use caution.     - 3/18/24 Technically difficult ECHO w/ normal LV size and function EF 50-55%, mildly reduced RV systolic function, mod dilated LA and RA, mod to sev MR, mild to mod TR, PASP 36  - Pt w/ BLE edema extending to thighs  - Continue Lasix 40 mg IV BID  - Monitor strict I/Os and daily weight    - EKG showed A fib RVR, PVC @ 127.   - No evidence of any active ischemia   - Continue aspirin and statin     - BP stable and controlled     - Plan for  surgical debridement of the right heel with bone biopsy, possible partial calcanectomy  - Pt has no active ischemia, decompensated heart failure, unstable arrythmia, or severe stenotic valvular disease. Patient is optimized from cardiovascular standpoint to proceed with planned procedure with routine hemodynamic monitoring.     - Monitor and replete lytes, keep K>4, Mg>2.  - Will continue to follow.    Danny Arce, MS FNP, AGACNP  Nurse Practitioner- Cardiology   Please call on TEAMS   53M w/ PMHx of Afib, CAD s/p stents, CVA, DM, HTN, pulmonary embolism on Eliquis,  osteomyelitis s/p debridement presenting  presents to emergency department from [facility] for R heel ulcer debridement.    Afib RVR, CAD, HTN  - Known  Afib, now RVR  - Continue Cardizem, changed to 90 mg PO Q6H  - Continue Eliquis   - Continue Metoprolol, change to 25 mg PO Q6H and up titrate for rate control.   - Given A fib RVR, would dig load with 0.5 mg IV x 1 followed by 0.25 mg IV Q6h x 2 doses   - Transfer to telemetry  - Check dig level in 2 days   - Had pauses last admission, so would use caution.     - 3/18/24 Technically difficult ECHO w/ normal LV size and function EF 50-55%, mildly reduced RV systolic function, mod dilated LA and RA, mod to sev MR, mild to mod TR, PASP 36  - Pt w/ BLE edema extending to thighs  - Continue Lasix 40 mg IV BID  - Monitor strict I/Os and daily weight    - EKG showed A fib RVR, PVC @ 127.   - No evidence of any active ischemia   - Continue aspirin and statin     - BP stable and controlled     - Plan for  surgical debridement of the right heel with bone biopsy, possible partial calcanectomy  - Pt has no active ischemia, decompensated heart failure, unstable arrythmia, or severe stenotic valvular disease. Patient is optimized from cardiovascular standpoint to proceed with planned procedure with routine hemodynamic monitoring.     - Monitor and replete lytes, keep K>4, Mg>2.  - Will continue to follow.    Danny Arce, MS FNP, AGACNP  Nurse Practitioner- Cardiology   Please call on TEAMS

## 2024-05-30 NOTE — PROGRESS NOTE ADULT - SUBJECTIVE AND OBJECTIVE BOX
[facility] -- Brittany Lutz Pannella, Patel, Savella, Goodger, Cohen: Office # [phone]    Follow Up:  Cardiac clearance, A fib RVR    Subjective/Observations: Patient awake, alert, resting in bed. Denies chest pain, palpitations and dizziness. Denies any difficulty breathing. No orthopnea and PND. Tolerating room air. Continues with LE edema. Continues to be in A fib RVR. OR postponed to possibly today.     REVIEW OF SYSTEMS: All other review of systems are negative unless indicated above    PAST MEDICAL & SURGICAL HISTORY:  Diabetes      Diabetes mellitus with no complication      Afib      Hypertension      Hypertension      BPH (benign prostatic hyperplasia)      Perforated gastric ulcer  s/p emergent ex-lap omentopexy and plication 6/2019      Pulmonary embolism      History of non-ST elevation myocardial infarction (NSTEMI)      Osteomyelitis  s/p debridement      CAD S/P percutaneous coronary angioplasty      Cerebrovascular accident      H/O abdominal surgery      Perforated gastric ulcer      Traumatic amputation of left foot, initial encounter      MEDICATIONS  (STANDING):  ascorbic acid 500 milliGRAM(s) Oral daily  atorvastatin 40 milliGRAM(s) Oral at bedtime  cholecalciferol 1000 Unit(s) Oral daily  dextrose 50% Injectable 25 Gram(s) IV Push once  dextrose 50% Injectable 25 Gram(s) IV Push once  dextrose 50% Injectable 12.5 Gram(s) IV Push once  dextrose Oral Gel 15 Gram(s) Oral once  digoxin  Injectable 250 MICROGram(s) IV Push every 6 hours  digoxin  Injectable 500 MICROGram(s) IV Push once  famotidine    Tablet 20 milliGRAM(s) Oral daily  ferrous    sulfate 325 milliGRAM(s) Oral two times a day  furosemide   Injectable 40 milliGRAM(s) IV Push two times a day  gabapentin 300 milliGRAM(s) Oral every 12 hours  glucagon  Injectable 1 milliGRAM(s) IntraMuscular once  heparin   Injectable 5000 Unit(s) SubCutaneous every 12 hours  insulin lispro (ADMELOG) corrective regimen sliding scale   SubCutaneous three times a day before meals  insulin lispro (ADMELOG) corrective regimen sliding scale   SubCutaneous at bedtime  loratadine 10 milliGRAM(s) Oral daily  melatonin 5 milliGRAM(s) Oral at bedtime  metoclopramide 5 milliGRAM(s) Oral three times a day  metoprolol tartrate 25 milliGRAM(s) Oral every 6 hours  multivitamin/minerals 1 Tablet(s) Oral daily  naloxegol 25 milliGRAM(s) Oral daily  oxybutynin XL 10 milliGRAM(s) Oral daily  oxyCODONE    IR 10 milliGRAM(s) Oral two times a day  polyethylene glycol 3350 17 Gram(s) Oral daily  potassium chloride    Tablet ER 10 milliEquivalent(s) Oral two times a day  senna 2 Tablet(s) Oral at bedtime  sucralfate 1 Gram(s) Oral two times a day  tiotropium 2.5 MICROgram(s) Inhaler 2 Puff(s) Inhalation daily    MEDICATIONS  (PRN):  acetaminophen     Tablet .. 650 milliGRAM(s) Oral every 6 hours PRN Temp greater or equal to 38C (100.4F), Mild Pain (1 - 3)  aluminum hydroxide/magnesium hydroxide/simethicone Suspension 30 milliLiter(s) Oral every 4 hours PRN Dyspepsia  ondansetron Injectable 4 milliGRAM(s) IV Push every 8 hours PRN Nausea and/or Vomiting  sodium chloride 0.65% Nasal 1 Spray(s) Both Nostrils three times a day PRN Nasal Congestion    Allergies  No Known Drug Allergies  fish (Hives)    Vital Signs Last 24 Hrs  T(C): 36.8 (30 May 2024 05:10), Max: 37.1 (29 May 2024 12:59)  T(F): 98.2 (30 May 2024 05:10), Max: 98.8 (29 May 2024 12:59)  HR: 94 (30 May 2024 05:10) (94 - 156)  BP: 103/64 (30 May 2024 05:10) (103/64 - 128/80)  BP(mean): --  RR: 17 (30 May 2024 05:10) (12 - 19)  SpO2: 92% (30 May 2024 05:10) (91% - 96%)    Parameters below as of 30 May 2024 05:10  Patient On (Oxygen Delivery Method): room air      I&O's Summary    29 May 2024 07:01  -  30 May 2024 07:00  --------------------------------------------------------  IN: 450 mL / OUT: 750 mL / NET: -300 mL      Weight (kg): 106.6 (05-29 @ 18:09)    TELE: A fib 110-150s   PHYSICAL EXAM:  Constitutional: NAD, awake and alert  HEENT: Moist Mucous Membranes, Anicteric  Pulmonary: Non-labored, breath sounds are clear bilaterally, No wheezing, rales or rhonchi  Cardiovascular: Regular, S1 and S2, No murmurs, No rubs, gallops or clicks  Gastrointestinal:  soft, nontender, nondistended   Lymph: +2 peripheral edema extending to thighs. No lymphadenopathy.   Skin: No visible rashes or ulcers.  Psych:  Mood & affect appropriate      LABS: All Labs Reviewed:                        11.6   10.99 )-----------( 348      ( 28 May 2024 11:43 )             38.0     28 May 2024 11:43    140    |  103    |  7      ----------------------------<  133    3.4     |  32     |  1.00     Ca    8.9        28 May 2024 11:43    TPro  7.1    /  Alb  2.3    /  TBili  0.3    /  DBili  x      /  AST  20     /  ALT  17     /  AlkPhos  102    28 May 2024 11:43   LIVER FUNCTIONS - ( 28 May 2024 11:43 )  Alb: 2.3 g/dL / Pro: 7.1 g/dL / ALK PHOS: 102 U/L / ALT: 17 U/L / AST: 20 U/L / GGT: x           PT/INR - ( 28 May 2024 11:43 )   PT: 14.3 sec;   INR: 1.23 ratio         PTT - ( 28 May 2024 11:43 )  PTT:36.7 sec  12 Lead ECG:   Ventricular Rate 127 BPM    QRS Duration 82 ms    Q-T Interval 276 ms    QTC Calculation(Bazett) 401 ms    R Axis 13 degrees    T Axis 5 degrees    Diagnosis Line Atrial fibrillation with rapid ventricular response with premature ventricular or aberrantly conducted complexes  Low voltage QRS  Cannot rule out Anterior infarct , age undetermined  Abnormal ECG    Confirmed by darien France ([id number]) on 5/28/2024 2:53:37 PM (05-28-24 @ 11:53)      TRANSTHORACIC ECHOCARDIOGRAM REPORT  ________________________________________________________________________________                                      _______       Pt. Name:       SARAH MORRISON Study Date:    3/18/2024  MRN:            [id number]         YOB: 1970  Accession #:    [id number]        Age:           53 years  Account#:       [id number]       Gender:        M  Heart Rate:                      Height:        74.02 in (188.00 cm)  Rhythm:                          Weight:        244.71 lb (111.00 kg)  Blood Pressure: 100/60 mmHg      BSA/BMI:       2.37 m² / 31.41 kg/m²  ________________________________________________________________________________________  Referring Physician:    [id number] Hill Brizuela  Interpreting Physician: Mary Maya MD  Primary Sonographer:    Mounika FELDMAN    CPT:               ECHO TTE WO CON COMP W DOPP - 33928.m  Indication(s):     Dyspnea, unspecified - R06.00  Procedure:         Transthoracic echocardiogram with 2-D, M-mode and complete                     spectral and color flow Doppler.  Ordering Location: [facility]  Admission Status:  Inpatient  Study Information: Image quality for this study is technically difficult.    _______________________________________________________________________________________     CONCLUSIONS:      1. Technically difficult image quality.   2. Left ventricular cavity is normal in size. Left ventricular wall thickness is normal. Left ventricular systolic function is normal with an ejection fraction visually estimated at 50 to 55 %.   3. Normal right ventricular cavity size, with normal wall thickness, and mildly reduced systolic function.   4. The left atrium is moderately dilated.   5. The right atrium is moderately dilated.   6. Moderate to severe mitral regurgitation.   7. Mild to moderate tricuspid regurgitation.   8. Estimated pulmonary artery systolic pressure is 36 mmHg, consistent with mild pulmonary hypertension.   9. The inferior vena cava is dilated measuring 2.40 cm in diameter, (dilated >2.1cm) with normal inspiratory collapse (normal >50%) consistent with mildly elevated right atrial pressure (~8, range 5-10mmHg).  10. Trace pericardial effusion.    ________________________________________________________________________________________  FINDINGS:     Left Ventricle:  The left ventricular cavity is normal in size. Left ventricular wall thickness is normal. Left ventricular systolic function is normal with an ejection fraction visually estimated at 50 to 55%.The left ventricular diastolic function is indeterminate.     Right Ventricle:  The right ventricular cavity is normal in size, with normal wall thickness and mildly reduced systolic function.     Left Atrium:  The left atrium is moderately dilated.     Right Atrium:  The right atrium is moderately dilated.     Aortic Valve:  The aortic valve anatomy cannot be determined with normal systolic excursion. There is no aortic valve stenosis.     Mitral Valve:  There is mild calcification of the mitralvalve annulus. There is moderate to severe mitral regurgitation.     Tricuspid Valve:  Structurally normal tricuspid valve with normal leaflet excursion. There is mild to moderate tricuspid regurgitation. Estimated pulmonary artery systolic pressure is 36 mmHg, consistent with mild pulmonary hypertension.     Pulmonic Valve:  The pulmonic valve was not well visualized. There is trace pulmonic regurgitation.     Pericardium:  There is a trace pericardial effusion.     Systemic Veins:  The inferiorvena cava is dilated measuring 2.40 cm in diameter, (dilated >2.1cm) with normal inspiratory collapse (normal >50%) consistent with mildly elevated right atrial pressure (~8, range 5-10mmHg).  ____________________________________________________________________  QUANTITATIVE DATA:  Left Ventricle Measurements: (Indexed to BSA)     IVSd (2D):   1.9 cm  LVPWd (2D):  1.5 cm  LVIDd (2D):  5.3 cm  LVIDs (2D):  3.9 cm  LV Mass:     413 g  174.4 g/m²  Visualized LV EF%: 50 to 55%     MV E Vmax:    1.15 m/s  e' lateral:   10.90 cm/s  e' medial:    10.50 cm/s  E/e' lateral: 10.56  E/e' medial:  12.00  E/e' Average: 10.76  MV DT:        137 msec    Aorta Measurements: (Normal range) (Indexed to BSA)     Sinuses of Valsalva: 3.50 cm (3.1 - 3.7 cm)       Left Atrium Measurements: (Indexed to BSA)  LA Diam 2D: 3.99 cm       LVOT / RVOT/ Qp/Qs Data: (Indexed to BSA)  LVOT Diameter: 2.22 cm    Mitral Valve Measurements:     MV E Vmax: 1.2 m/s       Tricuspid Valve Measurements:     TR Vmax:          2.6m/s  TR Peak Gradient: 27.7 mmHg  RA Pressure:      8 mmHg  PASP:             36 mmHg    ________________________________________________________________________________________  Electronically signed on 3/19/2024 at 11:27:59 AM by Mary Maya MD         *** Final ***   Yes

## 2024-05-30 NOTE — PROGRESS NOTE ADULT - SUBJECTIVE AND OBJECTIVE BOX
Date of Service 05-30-24 @ 16:21    Patient is a 53y old  Male who presents with a chief complaint of ankle wound (30 May 2024 12:06)      INTERVAL /OVERNIGHT EVENTS: AF with RVR    MEDICATIONS  (STANDING):  ascorbic acid 500 milliGRAM(s) Oral daily  atorvastatin 40 milliGRAM(s) Oral at bedtime  cholecalciferol 1000 Unit(s) Oral daily  dextrose 50% Injectable 12.5 Gram(s) IV Push once  dextrose 50% Injectable 25 Gram(s) IV Push once  dextrose 50% Injectable 25 Gram(s) IV Push once  dextrose Oral Gel 15 Gram(s) Oral once  digoxin  Injectable 250 MICROGram(s) IV Push every 6 hours  famotidine    Tablet 20 milliGRAM(s) Oral daily  ferrous    sulfate 325 milliGRAM(s) Oral two times a day  furosemide   Injectable 40 milliGRAM(s) IV Push two times a day  gabapentin 300 milliGRAM(s) Oral every 12 hours  glucagon  Injectable 1 milliGRAM(s) IntraMuscular once  heparin   Injectable 5000 Unit(s) SubCutaneous every 12 hours  insulin lispro (ADMELOG) corrective regimen sliding scale   SubCutaneous three times a day before meals  insulin lispro (ADMELOG) corrective regimen sliding scale   SubCutaneous at bedtime  loratadine 10 milliGRAM(s) Oral daily  melatonin 5 milliGRAM(s) Oral at bedtime  metoclopramide 5 milliGRAM(s) Oral three times a day  metoprolol tartrate 25 milliGRAM(s) Oral every 6 hours  multivitamin/minerals 1 Tablet(s) Oral daily  naloxegol 25 milliGRAM(s) Oral daily  oxybutynin 10 milliGRAM(s) Oral two times a day  oxyCODONE    IR 10 milliGRAM(s) Oral two times a day  polyethylene glycol 3350 17 Gram(s) Oral daily  potassium chloride    Tablet ER 10 milliEquivalent(s) Oral two times a day  senna 2 Tablet(s) Oral at bedtime  sucralfate 1 Gram(s) Oral two times a day  tiotropium 2.5 MICROgram(s) Inhaler 2 Puff(s) Inhalation daily    MEDICATIONS  (PRN):  acetaminophen     Tablet .. 650 milliGRAM(s) Oral every 6 hours PRN Temp greater or equal to 38C (100.4F), Mild Pain (1 - 3)  aluminum hydroxide/magnesium hydroxide/simethicone Suspension 30 milliLiter(s) Oral every 4 hours PRN Dyspepsia  ondansetron Injectable 4 milliGRAM(s) IV Push every 8 hours PRN Nausea and/or Vomiting  sodium chloride 0.65% Nasal 1 Spray(s) Both Nostrils three times a day PRN Nasal Congestion      Allergies    No Known Drug Allergies  fish (Hives)    Intolerances        REVIEW OF SYSTEMS:  CONSTITUTIONAL: No fever, weight loss, or fatigue  EYES: No eye pain, visual disturbances, or discharge  ENMT:  No difficulty hearing, tinnitus, vertigo; No sinus or throat pain  NECK: No pain or stiffness  RESPIRATORY: No cough, wheezing, chills or hemoptysis; No shortness of breath  CARDIOVASCULAR: No chest pain, palpitations, dizziness, or leg swelling  GASTROINTESTINAL: No abdominal or epigastric pain. No nausea, vomiting, or hematemesis; No diarrhea or constipation. No melena or hematochezia.  GENITOURINARY: No dysuria, frequency, hematuria, or incontinence  NEUROLOGICAL: No headaches, memory loss, loss of strength, numbness, or tremors  SKIN: No itching, burning, rashes, or lesions   LYMPH NODES: No enlarged glands  ENDOCRINE: No heat or cold intolerance; No hair loss; No polydipsia or polyuria  MUSCULOSKELETAL: No joint pain or swelling; No muscle, back, or extremity pain  PSYCHIATRIC: No depression, anxiety, mood swings, or difficulty sleeping  HEME/LYMPH: No easy bruising, or bleeding gums  ALLERGY AND IMMUNOLOGIC: No hives or eczema    Vital Signs Last 24 Hrs  T(C): 36.7 (30 May 2024 12:33), Max: 37 (29 May 2024 18:36)  T(F): 98 (30 May 2024 12:33), Max: 98.6 (29 May 2024 18:36)  HR: 96 (30 May 2024 12:33) (94 - 156)  BP: 119/77 (30 May 2024 12:33) (103/64 - 128/80)  BP(mean): --  RR: 16 (30 May 2024 12:33) (12 - 19)  SpO2: 93% (30 May 2024 12:33) (91% - 96%)    Parameters below as of 30 May 2024 12:33  Patient On (Oxygen Delivery Method): room air        PHYSICAL EXAM:  GENERAL: NAD, well-groomed, well-developed  HEAD:  Atraumatic, Normocephalic  EYES: EOMI, PERRLA, conjunctiva and sclera clear  ENMT: No tonsillar erythema, exudates, or enlargement; Moist mucous membranes, Good dentition, No lesions  NECK: Supple, No JVD, Normal thyroid  NERVOUS SYSTEM:  Alert & Oriented X3, Good concentration; Motor Strength 5/5 B/L upper and lower extremities; DTRs 2+ intact and symmetric  CHEST/LUNG: Clear to auscultation bilaterally; No rales, rhonchi, wheezing, or rubs  HEART: Regular rate and rhythm; No murmurs, rubs, or gallops  ABDOMEN: Soft, Nontender, Nondistended; Bowel sounds present  EXTREMITIES:  2+ Peripheral Pulses, No clubbing, cyanosis, +edema  LYMPH: No lymphadenopathy noted  SKIN: No rashes or lesions    LABS:              CAPILLARY BLOOD GLUCOSE      POCT Blood Glucose.: 184 mg/dL (30 May 2024 12:24)  POCT Blood Glucose.: 230 mg/dL (30 May 2024 08:32)  POCT Blood Glucose.: 201 mg/dL (29 May 2024 22:25)  POCT Blood Glucose.: 123 mg/dL (29 May 2024 18:30)  POCT Blood Glucose.: 132 mg/dL (29 May 2024 16:36)      RADIOLOGY & ADDITIONAL TESTS:    Notes Reviewed:  [x ] YES  [ ] NO    Care Discussed with Consultants/Other Providers [x ] YES  [ ] NO

## 2024-05-30 NOTE — PHYSICAL EXAM
[4 x 4] : 4 x 4  [Abdominal Pad] : Abdominal Pad [1+] : left 1+ [Ankle Swelling (On Exam)] : present [Ankle Swelling On The Left] : moderate [Skin Ulcer] : ulcer [Alert] : alert [Oriented to Person] : oriented to person [Oriented to Place] : oriented to place [Calm] : calm [Varicose Veins Of Lower Extremities] : not present [] : not present [de-identified] : calm [de-identified] : htn, hld, diabetic cardiovascular disease [de-identified] : s/p left TMA and Achilles tenotomy [de-identified] : left foot TMA , , closed.  Right posterior heel wound of skin, subcutaneous tissue, fat with necrotic and nonviable tissue, periwound erythema, serosanguineous drainage with malodor. [de-identified] : IDDM with neuropathy  [FreeTextEntry1] : Right Heel [FreeTextEntry2] : 7.0 [FreeTextEntry3] : 9.0 [FreeTextEntry4] : 0.2 [de-identified] : Serosanguinous drainage noted [de-identified] : 30% [de-identified] : 40% [de-identified] : 30% [de-identified] : Betadine soaked Adaptic Touch [de-identified] : Mechanically cleansed with sterile gauze and 0.9% normal saline. Dry Dressing Kerlix  3+ pitting edema  CIRCULATION Pulses nonpalpable via palpation.  Extremity Color: Normal Extremity Temperature: Warm Capillary Refill: < 3 seconds bilaterally [FreeTextEntry7] : Right foot - 4th digit [FreeTextEntry8] : 0.6 [FreeTextEntry9] : 1.7 [de-identified] : 0.1 [de-identified] : swollen [de-identified] : Dry Dressing [de-identified] : Mechanically cleansed with sterile gauze and 0.9% normal saline. Dry Dressing Kerlix  3+ pitting edema  CIRCULATION Pulses nonpalpable via palpation.  Extremity Color: Normal Extremity Temperature: Warm Capillary Refill: < 3 seconds bilaterally [TWNoteComboBox4] : Large [TWNoteComboBox5] : No [TWNoteComboBox6] : Pressure [de-identified] : No [de-identified] : Macerated [de-identified] : Mild [de-identified] : 100% [de-identified] : False [de-identified] : Daily [de-identified] : Primary Dressing [de-identified] : None [de-identified] : No [de-identified] : No [de-identified] : Erythema [de-identified] : None [de-identified] : 100% [de-identified] : None [de-identified] : No [de-identified] : Daily [de-identified] : Primary Dressing

## 2024-05-30 NOTE — ASSESSMENT
[Verbal] : Verbal [Demo] : Demo [Patient] : Patient [Good - alert, interested, motivated] : Good - alert, interested, motivated [Verbalizes knowledge/Understanding] : Verbalizes knowledge/understanding [Signs and symptoms of infection] : sign and symptoms of infection [How and When to Call] : how and when to call [Patient responsibility to plan of care] : patient responsibility to plan of care [] : Yes [Stable] : stable [Home] : Home [Not Applicable - Long Term Care/Home Health Agency] : Long Term Care/Home Health Agency: Not Applicable [Dressing changes] : dressing changes [Walker] : Walker [Written] : Written [Other: ___] : [unfilled] [FreeTextEntry2] : Compression Therapy Elevation & Low Sodium Compliance Foot & Nail Care Glycemic Control Infection Prevention Maintain Optimal Skin Integrity to High Pressure Areas Nutrition & Wound Healing Offload & Pressure Relief PT will maintain BP within individually acceptable range. Promote & Restore Optimal Skin Integrity Protect & Guard Wound Site Weight reduction strategies Wound Care (Dressing Changes) Vascular consult / Vascular studies. [FreeTextEntry3] : Initial Visit [FreeTextEntry4] : - Patient wants to return to wound care center for dressing changes. Patient and his CNA state the wound care at his facility is sub-par and they prefer to come here. - DPM advised to hold off on vascular studies - upon admittance to hospital all that's necessary will be done. - DPM assessed and advised patient he needs to be admitted to the hospital because the treatment the needs to get the wound back to a viable state requires more involvement than we can perform here. Pt wants to get through weekend.  Will return Tuesday to be admitted to hospital [FreeTextEntry1] : [facility]

## 2024-05-30 NOTE — PROGRESS NOTE ADULT - SUBJECTIVE AND OBJECTIVE BOX
[facility], Division of Infectious Diseases  DELGADO Rodriguez Y. Patel, S. Shah, [facility]  [phone]    Name: SARAH MORRISON  Age: 53y  Gender: Male  MRN: [id number]    Interval History:  Patient seen and examined at bedside   No acute overnight events. Afebrile  No complaints   Notes reviewed    Antibiotics:      Medications:  acetaminophen     Tablet .. 650 milliGRAM(s) Oral every 6 hours PRN  aluminum hydroxide/magnesium hydroxide/simethicone Suspension 30 milliLiter(s) Oral every 4 hours PRN  ascorbic acid 500 milliGRAM(s) Oral daily  atorvastatin 40 milliGRAM(s) Oral at bedtime  cholecalciferol 1000 Unit(s) Oral daily  dextrose 50% Injectable 12.5 Gram(s) IV Push once  dextrose 50% Injectable 25 Gram(s) IV Push once  dextrose 50% Injectable 25 Gram(s) IV Push once  dextrose Oral Gel 15 Gram(s) Oral once  digoxin  Injectable 500 MICROGram(s) IV Push once  digoxin  Injectable 250 MICROGram(s) IV Push every 6 hours  famotidine    Tablet 20 milliGRAM(s) Oral daily  ferrous    sulfate 325 milliGRAM(s) Oral two times a day  furosemide   Injectable 40 milliGRAM(s) IV Push two times a day  gabapentin 300 milliGRAM(s) Oral every 12 hours  glucagon  Injectable 1 milliGRAM(s) IntraMuscular once  heparin   Injectable 5000 Unit(s) SubCutaneous every 12 hours  insulin lispro (ADMELOG) corrective regimen sliding scale   SubCutaneous three times a day before meals  insulin lispro (ADMELOG) corrective regimen sliding scale   SubCutaneous at bedtime  loratadine 10 milliGRAM(s) Oral daily  melatonin 5 milliGRAM(s) Oral at bedtime  metoclopramide 5 milliGRAM(s) Oral three times a day  metoprolol tartrate 25 milliGRAM(s) Oral every 6 hours  multivitamin/minerals 1 Tablet(s) Oral daily  naloxegol 25 milliGRAM(s) Oral daily  ondansetron Injectable 4 milliGRAM(s) IV Push every 8 hours PRN  oxybutynin XL 10 milliGRAM(s) Oral daily  oxyCODONE    IR 10 milliGRAM(s) Oral two times a day  polyethylene glycol 3350 17 Gram(s) Oral daily  potassium chloride    Tablet ER 10 milliEquivalent(s) Oral two times a day  senna 2 Tablet(s) Oral at bedtime  sodium chloride 0.65% Nasal 1 Spray(s) Both Nostrils three times a day PRN  sucralfate 1 Gram(s) Oral two times a day  tiotropium 2.5 MICROgram(s) Inhaler 2 Puff(s) Inhalation daily      Review of Systems:  Review of systems otherwise negative except as previously noted.    Allergies: No Known Drug Allergies  fish (Hives)    For details regarding the patient's past medical history, social history, family history, and other miscellaneous elements, please refer the initial infectious diseases consultation and/or the admitting history and physical examination for this admission.    Objective:  Vitals:   T(C): 36.7 (05-30-24 @ 11:30), Max: 37.1 (05-29-24 @ 12:59)  HR: 95 (05-30-24 @ 11:30) (94 - 156)  BP: 116/68 (05-30-24 @ 11:30) (103/64 - 128/80)  RR: 18 (05-30-24 @ 11:30) (12 - 19)  SpO2: 92% (05-30-24 @ 11:30) (91% - 96%)    Physical Examination:  General: no acute distress  HEENT: NC/AT,   Cardio: RRR  Resp: decreased breath sounds   Abd: soft, NT, ND  Ext: no edema or cyanosis  Skin: warm, dry, no visible rash      Laboratory Studies:  CBC:   CMP:             Microbiology: reviewed        Radiology: reviewed

## 2024-05-30 NOTE — REASON FOR VISIT
[facility]  S317/01  HOSPITAL MEDICINE PROGRESS NOTE   Patient: Aleyda Peres  Today's Date: 4/8/2023    YOB: 1976  Admission Date: 4/5/2023    MRN: [id number]  Inpatient LOS: 1    Attending: Fredy Francisco MD  Hospital Day: Hospital Day: 4    Subjective   HISTORY AND SUBJECTIVE COMPLAINTS     Chief Complaint:   Bilateral leg swelling and dyspnea on exertion    Interval History / Subjective:     Reports headache, not able to sleep.  Worsening kidney function. VS stable      Hospital Course:  Aleyda Peres is a 47 year old female who presented on 4/5/2023 with complaints of Edema 13 yrs or more  .    ROS:  Pertinent systems negative except as above.    Objective   PHYSICAL EXAMINATION     Vital 24 Hour Range Most Recent Value   Temperature Temp  Min: 98.1 °F (36.7 °C)  Max: 98.5 °F (36.9 °C) 98.5 °F (36.9 °C)   Pulse Pulse  Min: 61  Max: 71 71   Respiratory Resp  Min: 18  Max: 18 18   Blood Pressure BP  Min: 128/80  Max: 150/87 133/66   Pulse Oximetry SpO2  Min: 97 %  Max: 100 % 98 %   Arterial BP No data recorded     O2 No data recorded       Recorded Intake and Output:  No intake or output data in the 24 hours ending 04/08/23 1421   Recorded Last Stool Occurrence:       Vital Most Recent Value First Value   Weight 114.3 kg (252 lb) Weight: 120.1 kg (264 lb 12.4 oz)   Height 5' 4\" (162.6 cm) Height: 5' 4\" (162.6 cm)   BMI 43.26 N/A       General:  NAD, at stated age appearing female   HEENT:  PERRL, conjunctivae clear, ear canals clear  Lungs:  Clear to auscultation right and left, no dullness to percussion.  Cardiovascular:  Normal S 1 and S 2, no S 3, S 4, no murmurs or gallops.  Abdomen:   +BS, soft, non-tender, non-distended  Musculoskeletal:   B/l RAUDEL improved  Neuro:  CN 2-12  Intact   Psych:  A+O X 3, mood and affect normal.          TEST RESULTS       Radiology: Imaging studies have been reviewed and pertinent findings discussed in the Assessment and Plan.  No results found for  any visits on 04/05/23 (from the past 48 hour(s)).     ANCILLARY ORDERS     Diet:  Cardiac, Renal (2400mg Na+, 60meq K+, 1000mg P) Diet  Telemetry: On  Consults:    IP CONSULT TO NEPHROLOGY  Therapy Orders:   No orders of the defined types were placed in this encounter.      ADVANCED DIRECTIVES     Code Status: Full Resuscitation           ASSESSMENT AND PLAN       1.Acute on chronic renal failure:  History of CKD stage 4 and nephrotic syndrome   Evaluated extensively in the past, noted to have significant proteinuria, electrophoresis consistent with glomerular proteinuria.  Refusing renal biopsy    Nephrology on board.  Appreciate recommendations     2.Bilateral lower extremity swelling associated with dyspnea on exertion, likely secondary to above.   Clinically improved.  IV Bumex discontinued due to worsening kidney function    Continue with  albumin spa.  Renal diet, monitor weight  Nephrology on board.  Appreciate recommendations     3.Hyperkalemia: Resolved  Received calcium gluconate and Lokelma on admission.  Continue to monitor.  Telemetry     4.Hypomagnesemia:  Supplement per protocol.  Continue to monitor     5.Hypertension: BP stable. PTA medications resumed .  Hydralazine p.r.n.    6. Anemia of chronic disease/iron deficiency anemia:  Received  IV Venofer x3 days.  Continue with p.o. iron supplementation.  Continue to monitor       DVT Prophylaxis: Heparin 5000 units sub q tid     Fredy Francisco MD  Hospitalist  4/8/2023  2:21 PM   [Other: _____] : [unfilled] [FreeTextEntry1] : Initial Visit

## 2024-05-30 NOTE — CARE COORDINATION ASSESSMENT. - OTHER PERTINENT DISCHARGE PLANNING INFORMATION:
SW met with this pt at bedside- pt is alert and oriented x4, admitted for foot surgery. Pt is from [facility] has been there x 4 years and agrees to return upon DC. Pt awaiting clearance for OR, SW to follow for safe dc and emotional support.

## 2024-05-30 NOTE — PROGRESS NOTE ADULT - ASSESSMENT
Pt is a 53M w/ PMHx of Afib, CAD, DM presenting for R heel ulcer debridement.   S/p zosyn x7 day course at facility  Being admitted for debridement, OR today    Recommendations:   Can hold Abx prior to OR as pt clinically stable  --ok to proceed w/ standard winston-op Abx protocol  Please obtain cx and path  Appreciate podiatry/wound care  Will start on Abx following debridement  Further recs to follow pending above    Infectious Diseases will follow. Please call with any questions.  Anne-Marie Li M.D.  [facility] Division of Infectious Diseases [phone]  For after 5 P.M. and weekends, please call [phone]    For after 5 P.M. and weekends, please call [phone]

## 2024-05-30 NOTE — CARE COORDINATION ASSESSMENT. - NSCAREPROVIDERS_GEN_ALL_CORE_FT
CARE PROVIDERS:  Accepting Physician: Hill Brizuela  Administration: Venu Estrada  Administration: Luis Carlos Main  Admitting: Hill Brizuela  Attending: Hill Brizuela  Case Management: Yecenia Kirkland  Consultant: Parviz Todd  Consultant: Calderon Cardenas  Consultant: Tessa Jewell  Consultant: Sravan Boateng  Consultant: Perlman, Craig  Consultant: Tai Sweeney  Consultant: Claudette Jacobs  Consultant: Jeovany France  Consultant: Danny Arce  Consultant: Weil, Patricia  Consultant: Mary Maya  Consultant: Lui Fox  Covering Team: Perlman, Daryl  ED ACP: Teri Robbins  ED Attending: George Hollins ED Nurse: Christine Pulliam  Infection Control: Ayaz Hernandez  Infection Control: Rachele Cespedes  Nurse: Adeline Vance  Nurse: Ania Estrada  Nurse: Jenni Andrews  Nurse: Kathy Brown  Nurse: Marisol Acevedo  Ordered: ADM, User  Outpatient Provider: Grisel Murray  Override: Gloria Stewart  Override: Jesus Abdullahi  Override: Kathy Brown  Override: Ed Escobar  Override: Marisol Acevedo  Override: Echo Swift  PCA/Nursing Assistant: Lidia Bey  Primary Team: Hill Brizuela  Primary Team: Samantha Ledesma  Primary Team: Fátima Ragsdale  Registered Dietitian: Renee Brambila  : Cee Canas

## 2024-05-30 NOTE — PLAN
[FreeTextEntry1] : Patient examined and evaluated at this time. Continue local wound care and offloading. Patient advised regarding the need to go to the hospital for further treatment including surgical debridement and bone biopsy, with possible partial calcanectomy of the right foot.  Patient verbalized understanding and all questions answered satisfaction at this time.  Patient to report to the hospital for continued care. Spent 30 minutes for patient care and medical decision making.

## 2024-05-31 DIAGNOSIS — I48.91 UNSPECIFIED ATRIAL FIBRILLATION: ICD-10-CM

## 2024-05-31 LAB
ALBUMIN SERPL ELPH-MCNC: 2.3 G/DL — LOW (ref 3.3–5)
ALP SERPL-CCNC: 108 U/L — SIGNIFICANT CHANGE UP (ref 40–120)
ALT FLD-CCNC: 14 U/L — SIGNIFICANT CHANGE UP (ref 12–78)
ANION GAP SERPL CALC-SCNC: 6 MMOL/L — SIGNIFICANT CHANGE UP (ref 5–17)
AST SERPL-CCNC: 18 U/L — SIGNIFICANT CHANGE UP (ref 15–37)
BILIRUB SERPL-MCNC: 0.5 MG/DL — SIGNIFICANT CHANGE UP (ref 0.2–1.2)
BUN SERPL-MCNC: 8 MG/DL — SIGNIFICANT CHANGE UP (ref 7–23)
CALCIUM SERPL-MCNC: 8.8 MG/DL — SIGNIFICANT CHANGE UP (ref 8.5–10.1)
CHLORIDE SERPL-SCNC: 103 MMOL/L — SIGNIFICANT CHANGE UP (ref 96–108)
CO2 SERPL-SCNC: 31 MMOL/L — SIGNIFICANT CHANGE UP (ref 22–31)
CREAT SERPL-MCNC: 0.94 MG/DL — SIGNIFICANT CHANGE UP (ref 0.5–1.3)
EGFR: 96 ML/MIN/1.73M2 — SIGNIFICANT CHANGE UP
GLUCOSE BLDC GLUCOMTR-MCNC: 172 MG/DL — HIGH (ref 70–99)
GLUCOSE BLDC GLUCOMTR-MCNC: 181 MG/DL — HIGH (ref 70–99)
GLUCOSE BLDC GLUCOMTR-MCNC: 220 MG/DL — HIGH (ref 70–99)
GLUCOSE BLDC GLUCOMTR-MCNC: 230 MG/DL — HIGH (ref 70–99)
GLUCOSE SERPL-MCNC: 190 MG/DL — HIGH (ref 70–99)
HCT VFR BLD CALC: 40.3 % — SIGNIFICANT CHANGE UP (ref 39–50)
HGB BLD-MCNC: 12.7 G/DL — LOW (ref 13–17)
MCHC RBC-ENTMCNC: 30.2 PG — SIGNIFICANT CHANGE UP (ref 27–34)
MCHC RBC-ENTMCNC: 31.5 GM/DL — LOW (ref 32–36)
MCV RBC AUTO: 96 FL — SIGNIFICANT CHANGE UP (ref 80–100)
NRBC # BLD: 0 /100 WBCS — SIGNIFICANT CHANGE UP (ref 0–0)
PLATELET # BLD AUTO: 333 K/UL — SIGNIFICANT CHANGE UP (ref 150–400)
POTASSIUM SERPL-MCNC: 3.3 MMOL/L — LOW (ref 3.5–5.3)
POTASSIUM SERPL-SCNC: 3.3 MMOL/L — LOW (ref 3.5–5.3)
PROT SERPL-MCNC: 6.9 G/DL — SIGNIFICANT CHANGE UP (ref 6–8.3)
RBC # BLD: 4.2 M/UL — SIGNIFICANT CHANGE UP (ref 4.2–5.8)
RBC # FLD: 14.5 % — SIGNIFICANT CHANGE UP (ref 10.3–14.5)
SODIUM SERPL-SCNC: 140 MMOL/L — SIGNIFICANT CHANGE UP (ref 135–145)
WBC # BLD: 9.87 K/UL — SIGNIFICANT CHANGE UP (ref 3.8–10.5)
WBC # FLD AUTO: 9.87 K/UL — SIGNIFICANT CHANGE UP (ref 3.8–10.5)

## 2024-05-31 PROCEDURE — 99233 SBSQ HOSP IP/OBS HIGH 50: CPT

## 2024-05-31 RX ORDER — POTASSIUM CHLORIDE 20 MEQ
10 PACKET (EA) ORAL
Refills: 0 | Status: COMPLETED | OUTPATIENT
Start: 2024-05-31 | End: 2024-05-31

## 2024-05-31 RX ORDER — POTASSIUM CHLORIDE 20 MEQ
10 PACKET (EA) ORAL
Refills: 0 | Status: DISCONTINUED | OUTPATIENT
Start: 2024-05-31 | End: 2024-06-04

## 2024-05-31 RX ORDER — DIPHENHYDRAMINE HCL 50 MG
25 CAPSULE ORAL ONCE
Refills: 0 | Status: COMPLETED | OUTPATIENT
Start: 2024-05-31 | End: 2024-05-31

## 2024-05-31 RX ADMIN — Medication 10 MILLIEQUIVALENT(S): at 18:10

## 2024-05-31 RX ADMIN — Medication 90 MILLIGRAM(S): at 06:48

## 2024-05-31 RX ADMIN — Medication 90 MILLIGRAM(S): at 00:22

## 2024-05-31 RX ADMIN — Medication 10 MILLIGRAM(S): at 18:09

## 2024-05-31 RX ADMIN — Medication 2: at 12:04

## 2024-05-31 RX ADMIN — Medication 1000 UNIT(S): at 11:55

## 2024-05-31 RX ADMIN — Medication 1 TABLET(S): at 11:54

## 2024-05-31 RX ADMIN — Medication 10 MILLIGRAM(S): at 06:49

## 2024-05-31 RX ADMIN — SENNA PLUS 2 TABLET(S): 8.6 TABLET ORAL at 21:49

## 2024-05-31 RX ADMIN — OXYCODONE HYDROCHLORIDE 10 MILLIGRAM(S): 5 TABLET ORAL at 06:50

## 2024-05-31 RX ADMIN — Medication 10 MILLIEQUIVALENT(S): at 11:55

## 2024-05-31 RX ADMIN — Medication 5 MILLIGRAM(S): at 13:26

## 2024-05-31 RX ADMIN — Medication 100 MILLIEQUIVALENT(S): at 14:55

## 2024-05-31 RX ADMIN — NALOXEGOL OXALATE 25 MILLIGRAM(S): 12.5 TABLET, FILM COATED ORAL at 11:57

## 2024-05-31 RX ADMIN — Medication 500 MILLIGRAM(S): at 11:54

## 2024-05-31 RX ADMIN — HEPARIN SODIUM 5000 UNIT(S): 5000 INJECTION INTRAVENOUS; SUBCUTANEOUS at 18:21

## 2024-05-31 RX ADMIN — LORATADINE 10 MILLIGRAM(S): 10 TABLET ORAL at 21:49

## 2024-05-31 RX ADMIN — Medication 5 MILLIGRAM(S): at 21:48

## 2024-05-31 RX ADMIN — Medication 1 APPLICATION(S): at 18:19

## 2024-05-31 RX ADMIN — Medication 1: at 08:20

## 2024-05-31 RX ADMIN — GABAPENTIN 300 MILLIGRAM(S): 400 CAPSULE ORAL at 18:10

## 2024-05-31 RX ADMIN — Medication 100 MILLIEQUIVALENT(S): at 13:25

## 2024-05-31 RX ADMIN — Medication 100 MILLIEQUIVALENT(S): at 11:55

## 2024-05-31 RX ADMIN — Medication 5 MILLIGRAM(S): at 06:49

## 2024-05-31 RX ADMIN — Medication 325 MILLIGRAM(S): at 06:50

## 2024-05-31 RX ADMIN — FAMOTIDINE 20 MILLIGRAM(S): 10 INJECTION INTRAVENOUS at 11:54

## 2024-05-31 RX ADMIN — GABAPENTIN 300 MILLIGRAM(S): 400 CAPSULE ORAL at 06:49

## 2024-05-31 RX ADMIN — Medication 25 MILLIGRAM(S): at 06:51

## 2024-05-31 RX ADMIN — Medication 90 MILLIGRAM(S): at 18:19

## 2024-05-31 RX ADMIN — Medication 1 GRAM(S): at 06:50

## 2024-05-31 RX ADMIN — Medication 25 MILLIGRAM(S): at 18:19

## 2024-05-31 RX ADMIN — Medication 250 MICROGRAM(S): at 00:22

## 2024-05-31 RX ADMIN — OXYCODONE HYDROCHLORIDE 10 MILLIGRAM(S): 5 TABLET ORAL at 07:45

## 2024-05-31 RX ADMIN — HEPARIN SODIUM 5000 UNIT(S): 5000 INJECTION INTRAVENOUS; SUBCUTANEOUS at 06:50

## 2024-05-31 RX ADMIN — Medication 1 GRAM(S): at 18:10

## 2024-05-31 RX ADMIN — Medication 10 MILLIEQUIVALENT(S): at 06:49

## 2024-05-31 RX ADMIN — Medication 325 MILLIGRAM(S): at 18:10

## 2024-05-31 RX ADMIN — Medication 25 MILLIGRAM(S): at 00:23

## 2024-05-31 RX ADMIN — Medication 40 MILLIGRAM(S): at 06:51

## 2024-05-31 RX ADMIN — Medication 1: at 17:28

## 2024-05-31 RX ADMIN — OXYCODONE HYDROCHLORIDE 10 MILLIGRAM(S): 5 TABLET ORAL at 18:10

## 2024-05-31 RX ADMIN — ATORVASTATIN CALCIUM 40 MILLIGRAM(S): 80 TABLET, FILM COATED ORAL at 21:49

## 2024-05-31 NOTE — PROGRESS NOTE ADULT - SUBJECTIVE AND OBJECTIVE BOX
Date of Service 05-31-24 @ 13:37    Patient is a 53y old  Male who presents with a chief complaint of ankle wound (31 May 2024 12:32)      INTERVAL /OVERNIGHT EVENTS: waiting for heel debridement    MEDICATIONS  (STANDING):  ascorbic acid 500 milliGRAM(s) Oral daily  atorvastatin 40 milliGRAM(s) Oral at bedtime  cholecalciferol 1000 Unit(s) Oral daily  dextrose 50% Injectable 12.5 Gram(s) IV Push once  dextrose 50% Injectable 25 Gram(s) IV Push once  dextrose 50% Injectable 25 Gram(s) IV Push once  dextrose Oral Gel 15 Gram(s) Oral once  diltiazem    Tablet 90 milliGRAM(s) Oral every 6 hours  famotidine    Tablet 20 milliGRAM(s) Oral daily  ferrous    sulfate 325 milliGRAM(s) Oral two times a day  furosemide   Injectable 40 milliGRAM(s) IV Push two times a day  gabapentin 300 milliGRAM(s) Oral every 12 hours  glucagon  Injectable 1 milliGRAM(s) IntraMuscular once  heparin   Injectable 5000 Unit(s) SubCutaneous every 12 hours  insulin lispro (ADMELOG) corrective regimen sliding scale   SubCutaneous three times a day before meals  insulin lispro (ADMELOG) corrective regimen sliding scale   SubCutaneous at bedtime  loratadine 10 milliGRAM(s) Oral daily  melatonin 5 milliGRAM(s) Oral at bedtime  metoclopramide 5 milliGRAM(s) Oral three times a day  metoprolol tartrate 25 milliGRAM(s) Oral every 6 hours  multivitamin/minerals 1 Tablet(s) Oral daily  naloxegol 25 milliGRAM(s) Oral daily  oxybutynin 10 milliGRAM(s) Oral two times a day  oxyCODONE    IR 10 milliGRAM(s) Oral two times a day  polyethylene glycol 3350 17 Gram(s) Oral daily  potassium chloride    Tablet ER 10 milliEquivalent(s) Oral four times a day  potassium chloride  10 mEq/100 mL IVPB 10 milliEquivalent(s) IV Intermittent every 1 hour  senna 2 Tablet(s) Oral at bedtime  sucralfate 1 Gram(s) Oral two times a day  tiotropium 2.5 MICROgram(s) Inhaler 2 Puff(s) Inhalation daily  triamcinolone 0.1% Ointment 1 Application(s) Topical two times a day    MEDICATIONS  (PRN):  acetaminophen     Tablet .. 650 milliGRAM(s) Oral every 6 hours PRN Temp greater or equal to 38C (100.4F), Mild Pain (1 - 3)  aluminum hydroxide/magnesium hydroxide/simethicone Suspension 30 milliLiter(s) Oral every 4 hours PRN Dyspepsia  ondansetron Injectable 4 milliGRAM(s) IV Push every 8 hours PRN Nausea and/or Vomiting  sodium chloride 0.65% Nasal 1 Spray(s) Both Nostrils three times a day PRN Nasal Congestion      Allergies    No Known Drug Allergies  fish (Hives)    Intolerances        REVIEW OF SYSTEMS:  CONSTITUTIONAL: No fever, weight loss, or fatigue  EYES: No eye pain, visual disturbances, or discharge  ENMT:  No difficulty hearing, tinnitus, vertigo; No sinus or throat pain  NECK: No pain or stiffness  RESPIRATORY: No cough, wheezing, chills or hemoptysis; No shortness of breath  CARDIOVASCULAR: No chest pain, palpitations, dizziness, or leg swelling  GASTROINTESTINAL: No abdominal or epigastric pain. No nausea, vomiting, or hematemesis; No diarrhea or constipation. No melena or hematochezia.  GENITOURINARY: No dysuria, frequency, hematuria, or incontinence  NEUROLOGICAL: No headaches, memory loss, loss of strength, numbness, or tremors  SKIN: + rashes  LYMPH NODES: No enlarged glands  ENDOCRINE: No heat or cold intolerance; No hair loss; No polydipsia or polyuria  MUSCULOSKELETAL: No joint pain or swelling; No muscle, back, or extremity pain  PSYCHIATRIC: No depression, anxiety, mood swings, or difficulty sleeping  HEME/LYMPH: No easy bruising, or bleeding gums  ALLERGY AND IMMUNOLOGIC: No hives or eczema    Vital Signs Last 24 Hrs  T(C): 37.1 (31 May 2024 11:21), Max: 37.1 (31 May 2024 11:21)  T(F): 98.8 (31 May 2024 11:21), Max: 98.8 (31 May 2024 11:21)  HR: 82 (31 May 2024 13:32) (65 - 103)  BP: 99/60 (31 May 2024 13:32) (99/60 - 132/66)  BP(mean): --  RR: 16 (31 May 2024 11:21) (16 - 16)  SpO2: 94% (31 May 2024 11:21) (90% - 94%)    Parameters below as of 31 May 2024 11:21  Patient On (Oxygen Delivery Method): room air        PHYSICAL EXAM:  GENERAL: NAD, well-groomed, well-developed  HEAD:  Atraumatic, Normocephalic  EYES: EOMI, PERRLA, conjunctiva and sclera clear  ENMT: No tonsillar erythema, exudates, or enlargement; Moist mucous membranes, Good dentition, No lesions  NECK: Supple, No JVD, Normal thyroid  NERVOUS SYSTEM:  Alert & Oriented X3, Good concentration; Motor Strength 5/5 B/L upper and lower extremities; DTRs 2+ intact and symmetric  CHEST/LUNG: Clear to auscultation bilaterally; No rales, rhonchi, wheezing, or rubs  HEART: Regular rate and rhythm; No murmurs, rubs, or gallops  ABDOMEN: Soft, Nontender, Nondistended; Bowel sounds present  EXTREMITIES:  left tma, right heel ulcer  LYMPH: No lymphadenopathy noted  SKIN: + rashes     LABS:                        12.7   9.87  )-----------( 333      ( 31 May 2024 07:48 )             40.3     31 May 2024 07:48    140    |  103    |  8      ----------------------------<  190    3.3     |  31     |  0.94     Ca    8.8        31 May 2024 07:48    TPro  6.9    /  Alb  2.3    /  TBili  0.5    /  DBili  x      /  AST  18     /  ALT  14     /  AlkPhos  108    31 May 2024 07:48      Urinalysis Basic - ( 31 May 2024 07:48 )    Color: x / Appearance: x / SG: x / pH: x  Gluc: 190 mg/dL / Ketone: x  / Bili: x / Urobili: x   Blood: x / Protein: x / Nitrite: x   Leuk Esterase: x / RBC: x / WBC x   Sq Epi: x / Non Sq Epi: x / Bacteria: x      CAPILLARY BLOOD GLUCOSE      POCT Blood Glucose.: 230 mg/dL (31 May 2024 11:37)  POCT Blood Glucose.: 172 mg/dL (31 May 2024 07:56)  POCT Blood Glucose.: 248 mg/dL (30 May 2024 21:00)  POCT Blood Glucose.: 167 mg/dL (30 May 2024 17:38)      RADIOLOGY & ADDITIONAL TESTS:    Notes Reviewed:  [x ] YES  [ ] NO    Care Discussed with Consultants/Other Providers [x ] YES  [ ] NO

## 2024-05-31 NOTE — PROGRESS NOTE ADULT - ASSESSMENT
Pt is a 53M w/ PMHx of Afib, CAD, DM presenting for R heel ulcer debridement.   S/p zosyn x7 day course at facility  Being admitted for debridement  Prior WCx MRSA/E. faecalis from 3/26/24    Recommendations:   Can hold Abx prior to OR as pt clinically stable  --ok to proceed w/ standard winston-op Abx protocol  Please obtain cx and path  Once out of OR can start on vancomycin base on prior cx  Appreciate podiatry/wound care  Further recs to follow pending above    Nathaniel Montano covering weekend service  Infectious Diseases will follow. Please call with any questions.  Anne-Marie Li M.D.  [facility] Division of Infectious Diseases [phone]  For after 5 P.M. and weekends, please call [phone]

## 2024-05-31 NOTE — PROGRESS NOTE ADULT - SUBJECTIVE AND OBJECTIVE BOX
CAPILLARY BLOOD GLUCOSE      POCT Blood Glucose.: 172 mg/dL (31 May 2024 07:56)  POCT Blood Glucose.: 248 mg/dL (30 May 2024 21:00)  POCT Blood Glucose.: 167 mg/dL (30 May 2024 17:38)  POCT Blood Glucose.: 184 mg/dL (30 May 2024 12:24)      Vital Signs Last 24 Hrs  T(C): 36.7 (31 May 2024 04:30), Max: 36.8 (30 May 2024 21:11)  T(F): 98 (31 May 2024 04:30), Max: 98.2 (30 May 2024 21:11)  HR: 69 (31 May 2024 04:30) (69 - 103)  BP: 110/59 (31 May 2024 04:30) (110/59 - 132/66)  BP(mean): --  RR: 16 (31 May 2024 04:30) (16 - 18)  SpO2: 90% (31 May 2024 04:30) (90% - 93%)    Parameters below as of 31 May 2024 04:30  Patient On (Oxygen Delivery Method): room air        General: WN/WD NAD  Respiratory: CTA B/L  CV: RRR, S1S2, no murmurs, rubs or gallops  Abdominal: Soft, NT, ND +BS, Last BM  Extremities: No edema, + peripheral pulses     05-31    140  |  103  |  8   ----------------------------<  190<H>  3.3<L>   |  31  |  0.94    Ca    8.8      31 May 2024 07:48    TPro  6.9  /  Alb  2.3<L>  /  TBili  0.5  /  DBili  x   /  AST  18  /  ALT  14  /  AlkPhos  108  05-31      atorvastatin 40 milliGRAM(s) Oral at bedtime  dextrose 50% Injectable 12.5 Gram(s) IV Push once  dextrose 50% Injectable 25 Gram(s) IV Push once  dextrose 50% Injectable 25 Gram(s) IV Push once  dextrose Oral Gel 15 Gram(s) Oral once  glucagon  Injectable 1 milliGRAM(s) IntraMuscular once  insulin lispro (ADMELOG) corrective regimen sliding scale   SubCutaneous three times a day before meals  insulin lispro (ADMELOG) corrective regimen sliding scale   SubCutaneous at bedtime

## 2024-05-31 NOTE — PROGRESS NOTE ADULT - ASSESSMENT
53M w/ PMHx of Afib, CAD s/p stents, CVA, DM, HTN, pulmonary embolism on Eliquis,  osteomyelitis s/p debridement presenting  presents to emergency department from [facility] for R heel ulcer debridement.    Afib RVR, CAD, HTN  - Known  Afib, initially RVR.  s/p Dig load.  Rates are now controlled  - Continue Cardizem 90 mg PO Q6H  - Hold home Eliquis in anticipation of Podia procedure  - Continue Metoprolol, change to 25 mg PO Q6H and up titrate for rate control.   - Continue telemetry monitoring for now  - Check dig level in 2 days   - Had pauses last admission, so would use caution.  No pauses noted on tele    - 3/18/24 Technically difficult ECHO w/ normal LV size and function EF 50-55%, mildly reduced RV systolic function, mod dilated LA and RA, mod to sev MR, mild to mod TR, PASP 36  - Continue Lasix 40 mg IV BID.  Net neg 1.6L  - Monitor strict I/Os and daily weight    - EKG showed A fib RVR, PVC @ 127.   - No evidence of any active ischemia   - Holding aspirin for procedure?  Resume postop  - Continue statin     - BP stable and controlled     - Plan for  surgical debridement of the right heel with bone biopsy, possible partial calcanectomy  - Pt has no active ischemia, decompensated heart failure, unstable arrythmia.  He has a known mod-severe MR but no clear volume overload except for mild edema.  Patient is optimized at best from cardiovascular standpoint to proceed with planned procedure with routine hemodynamic monitoring pending repletion of serum K (3.3)    - Monitor and replete lytes, keep K>4, Mg>2.  - Will continue to follow.    Henrietta Kim DNP, NP-C, AGACNP-C  Cardiology   Call TEAMS        53M w/ PMHx of Afib, CAD s/p stents, CVA, DM, HTN, pulmonary embolism on Eliquis,  osteomyelitis s/p debridement presenting  presents to emergency department from [facility] for R heel ulcer debridement.    Afib RVR, CAD, HTN  - Known  Afib, initially RVR.  s/p Dig load.  Rates are now controlled  - Continue Cardizem 90 mg PO Q6H  - Hold home Eliquis in anticipation of Podia procedure  - Continue Metoprolol, change to 25 mg PO Q6H and up titrate for rate control.   - Continue telemetry monitoring for now  - Check dig level in 2 days. Hold on further doses of Digoxin  - Had pauses last admission, so would use caution.  No pauses noted on tele    - 3/18/24 Technically difficult ECHO w/ normal LV size and function EF 50-55%, mildly reduced RV systolic function, mod dilated LA and RA, mod to sev MR, mild to mod TR, PASP 36  - Continue Lasix 40 mg IV BID.  Net neg 1.6L, needs aggressive K repletion prior to dosing. Likely PO lasix in AM  - Monitor strict I/Os and daily weight    - EKG showed A fib RVR, PVC @ 127.   - No evidence of any active ischemia   - Holding aspirin for procedure?  Resume postop  - Continue statin     - BP stable and controlled     - Plan for  surgical debridement of the right heel with bone biopsy, possible partial calcanectomy  - Pt has no active ischemia, decompensated heart failure, unstable arrythmia.  He has a known mod-severe MR but no clear volume overload except for mild edema.  Patient is optimized at best from cardiovascular standpoint to proceed with planned procedure with routine hemodynamic monitoring pending repletion of serum K (3.3)    - Monitor and replete lytes, keep K>4, Mg>2.  - Will continue to follow.    Henrietta Kim DNP, NP-C, AGACNP-C  Cardiology   Call TEAMS

## 2024-05-31 NOTE — CHART NOTE - NSCHARTNOTEFT_GEN_A_CORE
Called by RN for rash on b/l elbows/arms   Patient seen and examined at bedside with hives on arms which he states has been there for months and had appt with allergist but unable to go bc he is in hospital   Will order 25 benadryl ivp x1 and steroid cream to apply to affected area

## 2024-05-31 NOTE — PROGRESS NOTE ADULT - SUBJECTIVE AND OBJECTIVE BOX
[facility] -- Brittany Lutz, Zahra, Román Jacobs, , Gera Maya  Office # [phone]    Follow Up:  Cardiac Optimization, VHD    Subjective/Observations: Awake and alert.  Comfortable on RA.  Denies any form of respiratory or cardiac discomfort.  No tele events noted    REVIEW OF SYSTEMS: All other review of systems is negative unless indicated above  PAST MEDICAL & SURGICAL HISTORY:  Diabetes  Diabetes mellitus with no complication  Afib  Hypertension  BPH (benign prostatic hyperplasia)  Perforated gastric ulcer  s/p emergent ex-lap omentopexy and plication 6/2019  Pulmonary embolism  History of non-ST elevation myocardial infarction (NSTEMI)  Osteomyelitis  s/p debridement  CAD S/P percutaneous coronary angioplasty  Cerebrovascular accident  H/O abdominal surgery  Perforated gastric ulcer  Traumatic amputation of left foot, initial encounter    MEDICATIONS  (STANDING):  ascorbic acid 500 milliGRAM(s) Oral daily  atorvastatin 40 milliGRAM(s) Oral at bedtime  cholecalciferol 1000 Unit(s) Oral daily  dextrose 50% Injectable 12.5 Gram(s) IV Push once  dextrose 50% Injectable 25 Gram(s) IV Push once  dextrose 50% Injectable 25 Gram(s) IV Push once  dextrose Oral Gel 15 Gram(s) Oral once  diltiazem    Tablet 90 milliGRAM(s) Oral every 6 hours  famotidine    Tablet 20 milliGRAM(s) Oral daily  ferrous    sulfate 325 milliGRAM(s) Oral two times a day  furosemide   Injectable 40 milliGRAM(s) IV Push two times a day  gabapentin 300 milliGRAM(s) Oral every 12 hours  glucagon  Injectable 1 milliGRAM(s) IntraMuscular once  heparin   Injectable 5000 Unit(s) SubCutaneous every 12 hours  insulin lispro (ADMELOG) corrective regimen sliding scale   SubCutaneous three times a day before meals  insulin lispro (ADMELOG) corrective regimen sliding scale   SubCutaneous at bedtime  loratadine 10 milliGRAM(s) Oral daily  melatonin 5 milliGRAM(s) Oral at bedtime  metoclopramide 5 milliGRAM(s) Oral three times a day  metoprolol tartrate 25 milliGRAM(s) Oral every 6 hours  multivitamin/minerals 1 Tablet(s) Oral daily  naloxegol 25 milliGRAM(s) Oral daily  oxybutynin 10 milliGRAM(s) Oral two times a day  oxyCODONE    IR 10 milliGRAM(s) Oral two times a day  polyethylene glycol 3350 17 Gram(s) Oral daily  potassium chloride    Tablet ER 10 milliEquivalent(s) Oral two times a day  senna 2 Tablet(s) Oral at bedtime  sucralfate 1 Gram(s) Oral two times a day  tiotropium 2.5 MICROgram(s) Inhaler 2 Puff(s) Inhalation daily    MEDICATIONS  (PRN):  acetaminophen     Tablet .. 650 milliGRAM(s) Oral every 6 hours PRN Temp greater or equal to 38C (100.4F), Mild Pain (1 - 3)  aluminum hydroxide/magnesium hydroxide/simethicone Suspension 30 milliLiter(s) Oral every 4 hours PRN Dyspepsia  ondansetron Injectable 4 milliGRAM(s) IV Push every 8 hours PRN Nausea and/or Vomiting  sodium chloride 0.65% Nasal 1 Spray(s) Both Nostrils three times a day PRN Nasal Congestion    Allergies    No Known Drug Allergies  fish (Hives)    Intolerances    Vital Signs Last 24 Hrs  T(C): 36.7 (31 May 2024 04:30), Max: 36.8 (30 May 2024 21:11)  T(F): 98 (31 May 2024 04:30), Max: 98.2 (30 May 2024 21:11)  HR: 69 (31 May 2024 04:30) (69 - 103)  BP: 110/59 (31 May 2024 04:30) (110/59 - 132/66)  BP(mean): --  RR: 16 (31 May 2024 04:30) (16 - 18)  SpO2: 90% (31 May 2024 04:30) (90% - 93%)    Parameters below as of 31 May 2024 04:30  Patient On (Oxygen Delivery Method): room air    I&O's Summary    30 May 2024 07:01  -  31 May 2024 07:00  --------------------------------------------------------  IN: 0 mL / OUT: 1600 mL / NET: -1600 mL     PHYSICAL EXAM:  TELE: Afib  Constitutional: NAD, awake and alert, well-developed  HEENT: Moist Mucous Membranes, Anicteric  Pulmonary: Non-labored, breath sounds are clear but diminished bilaterally, No wheezing, rales or rhonchi  Cardiovascular: IRRR, S1 and S2, +murmurs, no rubs, gallops or clicks  Gastrointestinal: Bowel Sounds present, soft, nontender.   Lymph: No peripheral edema. No lymphadenopathy.  Skin: No visible rashes.  Right foot ulcers with dressing dry and intact  Psych:  Mood & affect appropriate  LABS: All Labs Reviewed:                        12.7   9.87  )-----------( 333      ( 31 May 2024 07:48 )             40.3                         12.6   9.99  )-----------( 329      ( 30 May 2024 17:50 )             39.8                         11.6   10.99 )-----------( 348      ( 28 May 2024 11:43 )             38.0     31 May 2024 07:48    140    |  103    |  8      ----------------------------<  190    3.3     |  31     |  0.94   30 May 2024 17:50    142    |  106    |  7      ----------------------------<  185    3.2     |  32     |  0.85   28 May 2024 11:43    140    |  103    |  7      ----------------------------<  133    3.4     |  32     |  1.00     Ca    8.8        31 May 2024 07:48  Ca    8.9        30 May 2024 17:50  Ca    8.9        28 May 2024 11:43    TPro  6.9    /  Alb  2.3    /  TBili  0.5    /  DBili  x      /  AST  18     /  ALT  14     /  AlkPhos  108    31 May 2024 07:48  TPro  7.0    /  Alb  2.4    /  TBili  0.5    /  DBili  x      /  AST  12     /  ALT  13     /  AlkPhos  108    30 May 2024 17:50  TPro  7.1    /  Alb  2.3    /  TBili  0.3    /  DBili  x      /  AST  20     /  ALT  17     /  AlkPhos  102    28 May 2024 11:43    12 Lead ECG:   Ventricular Rate 127 BPM    QRS Duration 82 ms    Q-T Interval 276 ms    QTC Calculation(Bazett) 401 ms    R Axis 13 degrees    T Axis 5 degrees    Diagnosis Line Atrial fibrillation with rapid ventricular response with premature ventricular or aberrantly conducted complexes  Low voltage QRS  Cannot rule out Anterior infarct , age undetermined  Abnormal ECG    Confirmed by darien France ([id number]) on 5/28/2024 2:53:37 PM (05-28-24 @ 11:53)      TRANSTHORACIC ECHOCARDIOGRAM REPORT  ________________________________________________________________________________                                      _______       Pt. Name:       SARAH MORRISON Study Date:    3/18/2024  MRN:            [id number]         YOB: 1970  Accession #:    [id number]        Age:           53 years  Account#:       [id number]       Gender:        M  Heart Rate:                      Height:        74.02 in (188.00 cm)  Rhythm:                          Weight:        244.71 lb (111.00 kg)  Blood Pressure: 100/60 mmHg      BSA/BMI:       2.37 m² / 31.41 kg/m²  ________________________________________________________________________________________  Referring Physician:    [id number] Hill Brizuela  Interpreting Physician: Mary Maya MD  Primary Sonographer:    Mounika FELDMAN    CPT:               ECHO TTE WO CON COMP W DOPP - 82093.m  Indication(s):     Dyspnea, unspecified - R06.00  Procedure:         Transthoracic echocardiogram with 2-D, M-mode and complete                     spectral and color flow Doppler.  Ordering Location: [facility]  Admission Status:  Inpatient  Study Information: Image quality for this study is technically difficult.    _______________________________________________________________________________________     CONCLUSIONS:      1. Technically difficult image quality.   2. Left ventricular cavity is normal in size. Left ventricular wall thickness is normal. Left ventricular systolic function is normal with an ejection fraction visually estimated at 50 to 55 %.   3. Normal right ventricular cavity size, with normal wall thickness, and mildly reduced systolic function.   4. The left atrium is moderately dilated.   5. The right atrium is moderately dilated.   6. Moderate to severe mitral regurgitation.   7. Mild to moderate tricuspid regurgitation.   8. Estimated pulmonary artery systolic pressure is 36 mmHg, consistent with mild pulmonary hypertension.   9. The inferior vena cava is dilated measuring 2.40 cm in diameter, (dilated >2.1cm) with normal inspiratory collapse (normal >50%) consistent with mildly elevated right atrial pressure (~8, range 5-10mmHg).  10. Trace pericardial effusion.    ________________________________________________________________________________________  FINDINGS:     Left Ventricle:  The left ventricular cavity is normal in size. Left ventricular wall thickness is normal. Left ventricular systolic function is normal with an ejection fraction visually estimated at 50 to 55%.The left ventricular diastolic function is indeterminate.     Right Ventricle:  The right ventricular cavity is normal in size, with normal wall thickness and mildly reduced systolic function.     Left Atrium:  The left atrium is moderately dilated.     Right Atrium:  The right atrium is moderately dilated.     Aortic Valve:  The aortic valve anatomy cannot be determined with normal systolic excursion. There is no aortic valve stenosis.     Mitral Valve:  There is mild calcification of the mitralvalve annulus. There is moderate to severe mitral regurgitation.     Tricuspid Valve:  Structurally normal tricuspid valve with normal leaflet excursion. There is mild to moderate tricuspid regurgitation. Estimated pulmonary artery systolic pressure is 36 mmHg, consistent with mild pulmonary hypertension.     Pulmonic Valve:  The pulmonic valve was not well visualized. There is trace pulmonic regurgitation.     Pericardium:  There is a trace pericardial effusion.     Systemic Veins:  The inferiorvena cava is dilated measuring 2.40 cm in diameter, (dilated >2.1cm) with normal inspiratory collapse (normal >50%) consistent with mildly elevated right atrial pressure (~8, range 5-10mmHg).  ____________________________________________________________________  QUANTITATIVE DATA:  Left Ventricle Measurements: (Indexed to BSA)     IVSd (2D):   1.9 cm  LVPWd (2D):  1.5 cm  LVIDd (2D):  5.3 cm  LVIDs (2D):  3.9 cm  LV Mass:     413 g  174.4 g/m²  Visualized LV EF%: 50 to 55%     MV E Vmax:    1.15 m/s  e' lateral:   10.90 cm/s  e' medial:    10.50 cm/s  E/e' lateral: 10.56  E/e' medial:  12.00  E/e' Average: 10.76  MV DT:        137 msec    Aorta Measurements: (Normal range) (Indexed to BSA)     Sinuses of Valsalva: 3.50 cm (3.1 - 3.7 cm)       Left Atrium Measurements: (Indexed to BSA)  LA Diam 2D: 3.99 cm       LVOT / RVOT/ Qp/Qs Data: (Indexed to BSA)  LVOT Diameter: 2.22 cm    Mitral Valve Measurements:     MV E Vmax: 1.2 m/s       Tricuspid Valve Measurements:     TR Vmax:          2.6m/s  TR Peak Gradient: 27.7 mmHg  RA Pressure:      8 mmHg  PASP:             36 mmHg    ________________________________________________________________________________________  Electronically signed on 3/19/2024 at 11:27:59 AM by Mary Maya MD         *** Final ***      [facility] -- Brittany Lutz, Zahra, Román Jacobs, , Gera Maya  Office # [phone]    Follow Up:  Cardiac Optimization, VHD    Subjective/Observations: Awake and alert.  Comfortable on RA.  Denies any form of respiratory or cardiac discomfort.  No tele events noted    REVIEW OF SYSTEMS: All other review of systems is negative unless indicated above  PAST MEDICAL & SURGICAL HISTORY:  Diabetes  Diabetes mellitus with no complication  Afib  Hypertension  BPH (benign prostatic hyperplasia)  Perforated gastric ulcer  s/p emergent ex-lap omentopexy and plication 6/2019  Pulmonary embolism  History of non-ST elevation myocardial infarction (NSTEMI)  Osteomyelitis  s/p debridement  CAD S/P percutaneous coronary angioplasty  Cerebrovascular accident  H/O abdominal surgery  Perforated gastric ulcer  Traumatic amputation of left foot, initial encounter    MEDICATIONS  (STANDING):  ascorbic acid 500 milliGRAM(s) Oral daily  atorvastatin 40 milliGRAM(s) Oral at bedtime  cholecalciferol 1000 Unit(s) Oral daily  dextrose 50% Injectable 12.5 Gram(s) IV Push once  dextrose 50% Injectable 25 Gram(s) IV Push once  dextrose 50% Injectable 25 Gram(s) IV Push once  dextrose Oral Gel 15 Gram(s) Oral once  diltiazem    Tablet 90 milliGRAM(s) Oral every 6 hours  famotidine    Tablet 20 milliGRAM(s) Oral daily  ferrous    sulfate 325 milliGRAM(s) Oral two times a day  furosemide   Injectable 40 milliGRAM(s) IV Push two times a day  gabapentin 300 milliGRAM(s) Oral every 12 hours  glucagon  Injectable 1 milliGRAM(s) IntraMuscular once  heparin   Injectable 5000 Unit(s) SubCutaneous every 12 hours  insulin lispro (ADMELOG) corrective regimen sliding scale   SubCutaneous three times a day before meals  insulin lispro (ADMELOG) corrective regimen sliding scale   SubCutaneous at bedtime  loratadine 10 milliGRAM(s) Oral daily  melatonin 5 milliGRAM(s) Oral at bedtime  metoclopramide 5 milliGRAM(s) Oral three times a day  metoprolol tartrate 25 milliGRAM(s) Oral every 6 hours  multivitamin/minerals 1 Tablet(s) Oral daily  naloxegol 25 milliGRAM(s) Oral daily  oxybutynin 10 milliGRAM(s) Oral two times a day  oxyCODONE    IR 10 milliGRAM(s) Oral two times a day  polyethylene glycol 3350 17 Gram(s) Oral daily  potassium chloride    Tablet ER 10 milliEquivalent(s) Oral two times a day  senna 2 Tablet(s) Oral at bedtime  sucralfate 1 Gram(s) Oral two times a day  tiotropium 2.5 MICROgram(s) Inhaler 2 Puff(s) Inhalation daily    MEDICATIONS  (PRN):  acetaminophen     Tablet .. 650 milliGRAM(s) Oral every 6 hours PRN Temp greater or equal to 38C (100.4F), Mild Pain (1 - 3)  aluminum hydroxide/magnesium hydroxide/simethicone Suspension 30 milliLiter(s) Oral every 4 hours PRN Dyspepsia  ondansetron Injectable 4 milliGRAM(s) IV Push every 8 hours PRN Nausea and/or Vomiting  sodium chloride 0.65% Nasal 1 Spray(s) Both Nostrils three times a day PRN Nasal Congestion    Allergies    No Known Drug Allergies  fish (Hives)    Intolerances    Vital Signs Last 24 Hrs  T(C): 36.7 (31 May 2024 04:30), Max: 36.8 (30 May 2024 21:11)  T(F): 98 (31 May 2024 04:30), Max: 98.2 (30 May 2024 21:11)  HR: 69 (31 May 2024 04:30) (69 - 103)  BP: 110/59 (31 May 2024 04:30) (110/59 - 132/66)  BP(mean): --  RR: 16 (31 May 2024 04:30) (16 - 18)  SpO2: 90% (31 May 2024 04:30) (90% - 93%)    Parameters below as of 31 May 2024 04:30  Patient On (Oxygen Delivery Method): room air    I&O's Summary    30 May 2024 07:01  -  31 May 2024 07:00  --------------------------------------------------------  IN: 0 mL / OUT: 1600 mL / NET: -1600 mL     PHYSICAL EXAM:  TELE: Afib  Constitutional: NAD, awake and alert, well-developed  HEENT: Moist Mucous Membranes, Anicteric  Pulmonary: Non-labored, breath sounds are clear but diminished bilaterally, No wheezing, rales or rhonchi  Cardiovascular: IRRR, S1 and S2, +murmurs, no rubs, gallops or clicks  Gastrointestinal: Bowel Sounds present, soft, nontender.   Lymph: No peripheral edema. No lymphadenopathy.  Skin: No visible rashes.  Right foot ulcers with dressing dry and intact  Psych:  Mood & affect appropriate  LABS: All Labs Reviewed:                        12.7   9.87  )-----------( 333      ( 31 May 2024 07:48 )             40.3                         12.6   9.99  )-----------( 329      ( 30 May 2024 17:50 )             39.8                         11.6   10.99 )-----------( 348      ( 28 May 2024 11:43 )             38.0     31 May 2024 07:48    140    |  103    |  8      ----------------------------<  190    3.3     |  31     |  0.94   30 May 2024 17:50    142    |  106    |  7      ----------------------------<  185    3.2     |  32     |  0.85   28 May 2024 11:43    140    |  103    |  7      ----------------------------<  133    3.4     |  32     |  1.00     Ca    8.8        31 May 2024 07:48  Ca    8.9        30 May 2024 17:50  Ca    8.9        28 May 2024 11:43    TPro  6.9    /  Alb  2.3    /  TBili  0.5    /  DBili  x      /  AST  18     /  ALT  14     /  AlkPhos  108    31 May 2024 07:48  TPro  7.0    /  Alb  2.4    /  TBili  0.5    /  DBili  x      /  AST  12     /  ALT  13     /  AlkPhos  108    30 May 2024 17:50  TPro  7.1    /  Alb  2.3    /  TBili  0.3    /  DBili  x      /  AST  20     /  ALT  17     /  AlkPhos  102    28 May 2024 11:43    12 Lead ECG:   Ventricular Rate 127 BPM    QRS Duration 82 ms    Q-T Interval 276 ms    QTC Calculation(Bazett) 401 ms    R Axis 13 degrees    T Axis 5 degrees    Diagnosis Line Atrial fibrillation with rapid ventricular response with premature ventricular or aberrantly conducted complexes  Low voltage QRS  Cannot rule out Anterior infarct , age undetermined  Abnormal ECG    Confirmed by darien France ([id number]) on 5/28/2024 2:53:37 PM (05-28-24 @ 11:53)      TRANSTHORACIC ECHOCARDIOGRAM REPORT  ________________________________________________________________________________                                      _______       Pt. Name:       SARAH MORRISON Study Date:    3/18/2024  MRN:            [id number]         YOB: 1970  Accession #:    [id number]        Age:           53 years  Account#:       [id number]       Gender:        M  Heart Rate:                      Height:        74.02 in (188.00 cm)  Rhythm:                          Weight:        244.71 lb (111.00 kg)  Blood Pressure: 100/60 mmHg      BSA/BMI:       2.37 m² / 31.41 kg/m²  ________________________________________________________________________________________  Referring Physician:    [id number] iHll Brizuela  Interpreting Physician: Mary Maya MD  Primary Sonographer:    Mounika FELDMAN    CPT:               ECHO TTE WO CON COMP W DOPP - 77563.m  Indication(s):     Dyspnea, unspecified - R06.00  Procedure:         Transthoracic echocardiogram with 2-D, M-mode and complete                     spectral and color flow Doppler.  Ordering Location: [facility]  Admission Status:  Inpatient  Study Information: Image quality for this study is technically difficult.    _______________________________________________________________________________________     CONCLUSIONS:      1. Technically difficult image quality.   2. Left ventricular cavity is normal in size. Left ventricular wall thickness is normal. Left ventricular systolic function is normal with an ejection fraction visually estimated at 50 to 55 %.   3. Normal right ventricular cavity size, with normal wall thickness, and mildly reduced systolic function.   4. The left atrium is moderately dilated.   5. The right atrium is moderately dilated.   6. Moderate to severe mitral regurgitation.   7. Mild to moderate tricuspid regurgitation.   8. Estimated pulmonary artery systolic pressure is 36 mmHg, consistent with mild pulmonary hypertension.   9. The inferior vena cava is dilated measuring 2.40 cm in diameter, (dilated >2.1cm) with normal inspiratory collapse (normal >50%) consistent with mildly elevated right atrial pressure (~8, range 5-10mmHg).  10. Trace pericardial effusion.    ________________________________________________________________________________________  FINDINGS:     Left Ventricle:  The left ventricular cavity is normal in size. Left ventricular wall thickness is normal. Left ventricular systolic function is normal with an ejection fraction visually estimated at 50 to 55%.The left ventricular diastolic function is indeterminate.     Right Ventricle:  The right ventricular cavity is normal in size, with normal wall thickness and mildly reduced systolic function.     Left Atrium:  The left atrium is moderately dilated.     Right Atrium:  The right atrium is moderately dilated.     Aortic Valve:  The aortic valve anatomy cannot be determined with normal systolic excursion. There is no aortic valve stenosis.     Mitral Valve:  There is mild calcification of the mitralvalve annulus. There is moderate to severe mitral regurgitation.     Tricuspid Valve:  Structurally normal tricuspid valve with normal leaflet excursion. There is mild to moderate tricuspid regurgitation. Estimated pulmonary artery systolic pressure is 36 mmHg, consistent with mild pulmonary hypertension.     Pulmonic Valve:  The pulmonic valve was not well visualized. There is trace pulmonic regurgitation.     Pericardium:  There is a trace pericardial effusion.     Systemic Veins:  The inferiorvena cava is dilated measuring 2.40 cm in diameter, (dilated >2.1cm) with normal inspiratory collapse (normal >50%) consistent with mildly elevated right atrial pressure (~8, range 5-10mmHg).  ____________________________________________________________________  QUANTITATIVE DATA:  Left Ventricle Measurements: (Indexed to BSA)     IVSd (2D):   1.9 cm  LVPWd (2D):  1.5 cm  LVIDd (2D):  5.3 cm  LVIDs (2D):  3.9 cm  LV Mass:     413 g  174.4 g/m²  Visualized LV EF%: 50 to 55%     MV E Vmax:    1.15 m/s  e' lateral:   10.90 cm/s  e' medial:    10.50 cm/s  E/e' lateral: 10.56  E/e' medial:  12.00  E/e' Average: 10.76  MV DT:        137 msec    Aorta Measurements: (Normal range) (Indexed to BSA)     Sinuses of Valsalva: 3.50 cm (3.1 - 3.7 cm)       Left Atrium Measurements: (Indexed to BSA)  LA Diam 2D: 3.99 cm       LVOT / RVOT/ Qp/Qs Data: (Indexed to BSA)  LVOT Diameter: 2.22 cm    Mitral Valve Measurements:     MV E Vmax: 1.2 m/s       Tricuspid Valve Measurements:     TR Vmax:          2.6m/s  TR Peak Gradient: 27.7 mmHg  RA Pressure:      8 mmHg  PASP:             36 mmHg    ________________________________________________________________________________________  Electronically signed on 3/19/2024 at 11:27:59 AM by Mary Maya MD         *** Final ***

## 2024-05-31 NOTE — PROGRESS NOTE ADULT - SUBJECTIVE AND OBJECTIVE BOX
[facility], Division of Infectious Diseases  DELGADO Rodriguez Y. Patel, S. [facility], [facility]  [phone]    Name: SARAH MORRISON  Age: 53y  Gender: Male  MRN: [id number]    Interval History:  Patient seen and examined at bedside   No acute overnight events. Afebrile  Notes reviewed    Antibiotics:      Medications:  acetaminophen     Tablet .. 650 milliGRAM(s) Oral every 6 hours PRN  aluminum hydroxide/magnesium hydroxide/simethicone Suspension 30 milliLiter(s) Oral every 4 hours PRN  ascorbic acid 500 milliGRAM(s) Oral daily  atorvastatin 40 milliGRAM(s) Oral at bedtime  cholecalciferol 1000 Unit(s) Oral daily  dextrose 50% Injectable 12.5 Gram(s) IV Push once  dextrose 50% Injectable 25 Gram(s) IV Push once  dextrose 50% Injectable 25 Gram(s) IV Push once  dextrose Oral Gel 15 Gram(s) Oral once  diltiazem    Tablet 90 milliGRAM(s) Oral every 6 hours  famotidine    Tablet 20 milliGRAM(s) Oral daily  ferrous    sulfate 325 milliGRAM(s) Oral two times a day  furosemide   Injectable 40 milliGRAM(s) IV Push two times a day  gabapentin 300 milliGRAM(s) Oral every 12 hours  glucagon  Injectable 1 milliGRAM(s) IntraMuscular once  heparin   Injectable 5000 Unit(s) SubCutaneous every 12 hours  insulin lispro (ADMELOG) corrective regimen sliding scale   SubCutaneous three times a day before meals  insulin lispro (ADMELOG) corrective regimen sliding scale   SubCutaneous at bedtime  loratadine 10 milliGRAM(s) Oral daily  melatonin 5 milliGRAM(s) Oral at bedtime  metoclopramide 5 milliGRAM(s) Oral three times a day  metoprolol tartrate 25 milliGRAM(s) Oral every 6 hours  multivitamin/minerals 1 Tablet(s) Oral daily  naloxegol 25 milliGRAM(s) Oral daily  ondansetron Injectable 4 milliGRAM(s) IV Push every 8 hours PRN  oxybutynin 10 milliGRAM(s) Oral two times a day  oxyCODONE    IR 10 milliGRAM(s) Oral two times a day  polyethylene glycol 3350 17 Gram(s) Oral daily  potassium chloride    Tablet ER 10 milliEquivalent(s) Oral four times a day  potassium chloride  10 mEq/100 mL IVPB 10 milliEquivalent(s) IV Intermittent every 1 hour  senna 2 Tablet(s) Oral at bedtime  sodium chloride 0.65% Nasal 1 Spray(s) Both Nostrils three times a day PRN  sucralfate 1 Gram(s) Oral two times a day  tiotropium 2.5 MICROgram(s) Inhaler 2 Puff(s) Inhalation daily  triamcinolone 0.1% Ointment 1 Application(s) Topical two times a day      ROS unable to obtain    Allergies: No Known Drug Allergies  fish (Hives)    For details regarding the patient's past medical history, social history, family history, and other miscellaneous elements, please refer the initial infectious diseases consultation and/or the admitting history and physical examination for this admission.    Objective:  Vitals:   T(C): 37.1 (05-31-24 @ 11:21), Max: 37.1 (05-31-24 @ 11:21)  HR: 65 (05-31-24 @ 11:21) (65 - 103)  BP: 99/60 (05-31-24 @ 11:21) (99/60 - 132/66)  RR: 16 (05-31-24 @ 11:21) (16 - 16)  SpO2: 94% (05-31-24 @ 11:21) (90% - 94%)    Physical Examination:  General: no acute distress  HEENT: NC/AT, EOMI,  Cardio: RRR  Resp: decreased breath sounds  Abd: soft, NT, ND  Ext: no edema or cyanosis  Skin: warm, dry, no visible rash      Laboratory Studies:  CBC:                       12.7   9.87  )-----------( 333      ( 31 May 2024 07:48 )             40.3     CMP: 05-31    140  |  103  |  8   ----------------------------<  190<H>  3.3<L>   |  31  |  0.94    Ca    8.8      31 May 2024 07:48    TPro  6.9  /  Alb  2.3<L>  /  TBili  0.5  /  DBili  x   /  AST  18  /  ALT  14  /  AlkPhos  108  05-31    LIVER FUNCTIONS - ( 31 May 2024 07:48 )  Alb: 2.3 g/dL / Pro: 6.9 g/dL / ALK PHOS: 108 U/L / ALT: 14 U/L / AST: 18 U/L / GGT: x           Urinalysis Basic - ( 31 May 2024 07:48 )    Color: x / Appearance: x / SG: x / pH: x  Gluc: 190 mg/dL / Ketone: x  / Bili: x / Urobili: x   Blood: x / Protein: x / Nitrite: x   Leuk Esterase: x / RBC: x / WBC x   Sq Epi: x / Non Sq Epi: x / Bacteria: x        Microbiology: reviewed        Radiology: reviewed

## 2024-05-31 NOTE — SOCIAL WORK PROGRESS NOTE - NSSWPROGRESSNOTE_GEN_ALL_CORE
As per [facility] admissions upon discharge from the [facility] Medicaid  is [place] [phone] Ext [phone] and Auth would be necessary for pt to return to snf. Pt to have debridement in the am not dc ready.

## 2024-06-01 LAB
ANION GAP SERPL CALC-SCNC: 8 MMOL/L — SIGNIFICANT CHANGE UP (ref 5–17)
BUN SERPL-MCNC: 11 MG/DL — SIGNIFICANT CHANGE UP (ref 7–23)
CALCIUM SERPL-MCNC: 9.1 MG/DL — SIGNIFICANT CHANGE UP (ref 8.5–10.1)
CHLORIDE SERPL-SCNC: 106 MMOL/L — SIGNIFICANT CHANGE UP (ref 96–108)
CO2 SERPL-SCNC: 25 MMOL/L — SIGNIFICANT CHANGE UP (ref 22–31)
CREAT SERPL-MCNC: 1 MG/DL — SIGNIFICANT CHANGE UP (ref 0.5–1.3)
EGFR: 89 ML/MIN/1.73M2 — SIGNIFICANT CHANGE UP
GLUCOSE BLDC GLUCOMTR-MCNC: 140 MG/DL — HIGH (ref 70–99)
GLUCOSE BLDC GLUCOMTR-MCNC: 172 MG/DL — HIGH (ref 70–99)
GLUCOSE BLDC GLUCOMTR-MCNC: 211 MG/DL — HIGH (ref 70–99)
GLUCOSE BLDC GLUCOMTR-MCNC: 240 MG/DL — HIGH (ref 70–99)
GLUCOSE SERPL-MCNC: 224 MG/DL — HIGH (ref 70–99)
POTASSIUM SERPL-MCNC: 3.8 MMOL/L — SIGNIFICANT CHANGE UP (ref 3.5–5.3)
POTASSIUM SERPL-SCNC: 3.8 MMOL/L — SIGNIFICANT CHANGE UP (ref 3.5–5.3)
SODIUM SERPL-SCNC: 139 MMOL/L — SIGNIFICANT CHANGE UP (ref 135–145)

## 2024-06-01 PROCEDURE — 99232 SBSQ HOSP IP/OBS MODERATE 35: CPT

## 2024-06-01 RX ORDER — DIPHENHYDRAMINE HCL 50 MG
25 CAPSULE ORAL ONCE
Refills: 0 | Status: COMPLETED | OUTPATIENT
Start: 2024-06-01 | End: 2024-06-01

## 2024-06-01 RX ADMIN — Medication 25 MILLIGRAM(S): at 23:31

## 2024-06-01 RX ADMIN — GABAPENTIN 300 MILLIGRAM(S): 400 CAPSULE ORAL at 17:54

## 2024-06-01 RX ADMIN — Medication 40 MILLIGRAM(S): at 13:23

## 2024-06-01 RX ADMIN — SENNA PLUS 2 TABLET(S): 8.6 TABLET ORAL at 21:34

## 2024-06-01 RX ADMIN — Medication 1 APPLICATION(S): at 06:21

## 2024-06-01 RX ADMIN — FAMOTIDINE 20 MILLIGRAM(S): 10 INJECTION INTRAVENOUS at 12:23

## 2024-06-01 RX ADMIN — Medication 500 MILLIGRAM(S): at 12:23

## 2024-06-01 RX ADMIN — Medication 1 TABLET(S): at 12:22

## 2024-06-01 RX ADMIN — Medication 10 MILLIEQUIVALENT(S): at 00:06

## 2024-06-01 RX ADMIN — NALOXEGOL OXALATE 25 MILLIGRAM(S): 12.5 TABLET, FILM COATED ORAL at 12:25

## 2024-06-01 RX ADMIN — Medication 25 MILLIGRAM(S): at 00:05

## 2024-06-01 RX ADMIN — OXYCODONE HYDROCHLORIDE 10 MILLIGRAM(S): 5 TABLET ORAL at 06:11

## 2024-06-01 RX ADMIN — Medication 1 APPLICATION(S): at 23:31

## 2024-06-01 RX ADMIN — GABAPENTIN 300 MILLIGRAM(S): 400 CAPSULE ORAL at 06:09

## 2024-06-01 RX ADMIN — Medication 40 MILLIGRAM(S): at 06:11

## 2024-06-01 RX ADMIN — Medication 1 APPLICATION(S): at 12:32

## 2024-06-01 RX ADMIN — HEPARIN SODIUM 5000 UNIT(S): 5000 INJECTION INTRAVENOUS; SUBCUTANEOUS at 17:55

## 2024-06-01 RX ADMIN — ATORVASTATIN CALCIUM 40 MILLIGRAM(S): 80 TABLET, FILM COATED ORAL at 21:34

## 2024-06-01 RX ADMIN — Medication 25 MILLIGRAM(S): at 00:06

## 2024-06-01 RX ADMIN — Medication 25 MILLIGRAM(S): at 12:22

## 2024-06-01 RX ADMIN — LORATADINE 10 MILLIGRAM(S): 10 TABLET ORAL at 21:34

## 2024-06-01 RX ADMIN — Medication 5 MILLIGRAM(S): at 13:23

## 2024-06-01 RX ADMIN — Medication 90 MILLIGRAM(S): at 23:31

## 2024-06-01 RX ADMIN — Medication 1000 UNIT(S): at 12:23

## 2024-06-01 RX ADMIN — Medication 25 MILLIGRAM(S): at 17:55

## 2024-06-01 RX ADMIN — Medication 25 MILLIGRAM(S): at 21:34

## 2024-06-01 RX ADMIN — Medication 10 MILLIEQUIVALENT(S): at 23:31

## 2024-06-01 RX ADMIN — Medication 90 MILLIGRAM(S): at 00:05

## 2024-06-01 RX ADMIN — Medication 5 MILLIGRAM(S): at 21:34

## 2024-06-01 RX ADMIN — Medication 325 MILLIGRAM(S): at 06:10

## 2024-06-01 RX ADMIN — TIOTROPIUM BROMIDE 2 PUFF(S): 18 CAPSULE ORAL; RESPIRATORY (INHALATION) at 08:10

## 2024-06-01 RX ADMIN — OXYCODONE HYDROCHLORIDE 10 MILLIGRAM(S): 5 TABLET ORAL at 17:55

## 2024-06-01 RX ADMIN — Medication 10 MILLIEQUIVALENT(S): at 06:11

## 2024-06-01 RX ADMIN — Medication 10 MILLIGRAM(S): at 06:10

## 2024-06-01 RX ADMIN — Medication 2: at 08:10

## 2024-06-01 RX ADMIN — Medication 10 MILLIEQUIVALENT(S): at 12:22

## 2024-06-01 RX ADMIN — Medication 5 MILLIGRAM(S): at 06:10

## 2024-06-01 RX ADMIN — Medication 1 APPLICATION(S): at 17:57

## 2024-06-01 RX ADMIN — Medication 1 GRAM(S): at 06:10

## 2024-06-01 RX ADMIN — Medication 325 MILLIGRAM(S): at 17:55

## 2024-06-01 RX ADMIN — Medication 1: at 12:24

## 2024-06-01 RX ADMIN — Medication 90 MILLIGRAM(S): at 12:23

## 2024-06-01 RX ADMIN — OXYCODONE HYDROCHLORIDE 10 MILLIGRAM(S): 5 TABLET ORAL at 18:53

## 2024-06-01 RX ADMIN — Medication 10 MILLIEQUIVALENT(S): at 17:53

## 2024-06-01 RX ADMIN — Medication 90 MILLIGRAM(S): at 06:09

## 2024-06-01 RX ADMIN — Medication 1 GRAM(S): at 17:54

## 2024-06-01 RX ADMIN — Medication 10 MILLIGRAM(S): at 17:54

## 2024-06-01 RX ADMIN — Medication 25 MILLIGRAM(S): at 06:10

## 2024-06-01 NOTE — PROGRESS NOTE ADULT - SUBJECTIVE AND OBJECTIVE BOX
Patient is a 53y old  Male who presents with a chief complaint of ankle wound (01 Jun 2024 08:33)      INTERVAL HPI/OVERNIGHT EVENTS: Patient seen and examined at bedside. No overnight events.  Plan for OR with podiatry on Tuesday    MEDICATIONS  (STANDING):  ascorbic acid 500 milliGRAM(s) Oral daily  atorvastatin 40 milliGRAM(s) Oral at bedtime  cholecalciferol 1000 Unit(s) Oral daily  dextrose 50% Injectable 25 Gram(s) IV Push once  dextrose 50% Injectable 12.5 Gram(s) IV Push once  dextrose 50% Injectable 25 Gram(s) IV Push once  dextrose Oral Gel 15 Gram(s) Oral once  diltiazem    Tablet 90 milliGRAM(s) Oral every 6 hours  famotidine    Tablet 20 milliGRAM(s) Oral daily  ferrous    sulfate 325 milliGRAM(s) Oral two times a day  gabapentin 300 milliGRAM(s) Oral every 12 hours  glucagon  Injectable 1 milliGRAM(s) IntraMuscular once  heparin   Injectable 5000 Unit(s) SubCutaneous every 12 hours  insulin lispro (ADMELOG) corrective regimen sliding scale   SubCutaneous three times a day before meals  insulin lispro (ADMELOG) corrective regimen sliding scale   SubCutaneous at bedtime  loratadine 10 milliGRAM(s) Oral daily  melatonin 5 milliGRAM(s) Oral at bedtime  metoclopramide 5 milliGRAM(s) Oral three times a day  metoprolol tartrate 25 milliGRAM(s) Oral every 6 hours  multivitamin/minerals 1 Tablet(s) Oral daily  naloxegol 25 milliGRAM(s) Oral daily  oxybutynin 10 milliGRAM(s) Oral two times a day  oxyCODONE    IR 10 milliGRAM(s) Oral two times a day  polyethylene glycol 3350 17 Gram(s) Oral daily  potassium chloride    Tablet ER 10 milliEquivalent(s) Oral four times a day  senna 2 Tablet(s) Oral at bedtime  sucralfate 1 Gram(s) Oral two times a day  tiotropium 2.5 MICROgram(s) Inhaler 2 Puff(s) Inhalation daily  torsemide 20 milliGRAM(s) Oral two times a day  triamcinolone 0.1% Cream 1 Application(s) Topical every 6 hours    MEDICATIONS  (PRN):  acetaminophen     Tablet .. 650 milliGRAM(s) Oral every 6 hours PRN Temp greater or equal to 38C (100.4F), Mild Pain (1 - 3)  aluminum hydroxide/magnesium hydroxide/simethicone Suspension 30 milliLiter(s) Oral every 4 hours PRN Dyspepsia  ondansetron Injectable 4 milliGRAM(s) IV Push every 8 hours PRN Nausea and/or Vomiting  sodium chloride 0.65% Nasal 1 Spray(s) Both Nostrils three times a day PRN Nasal Congestion      Allergies    No Known Drug Allergies  fish (Hives)    Intolerances        REVIEW OF SYSTEMS:  CONSTITUTIONAL: No fever or chills  CARDIOVASCULAR: No chest pain, palpitations  GASTROINTESTINAL: No abd pain, nausea, vomiting    Vital Signs Last 24 Hrs  T(C): 36.9 (01 Jun 2024 12:06), Max: 37.2 (31 May 2024 20:47)  T(F): 98.5 (01 Jun 2024 12:06), Max: 98.9 (31 May 2024 20:47)  HR: 87 (01 Jun 2024 17:50) (80 - 87)  BP: 107/62 (01 Jun 2024 17:50) (103/66 - 123/84)  BP(mean): --  RR: 16 (01 Jun 2024 12:06) (16 - 18)  SpO2: 92% (01 Jun 2024 12:06) (91% - 94%)    Parameters below as of 01 Jun 2024 12:06  Patient On (Oxygen Delivery Method): room air      I&O's Summary    31 May 2024 07:01  -  01 Jun 2024 07:00  --------------------------------------------------------  IN: 300 mL / OUT: 2500 mL / NET: -2200 mL    01 Jun 2024 07:01  -  01 Jun 2024 20:12  --------------------------------------------------------  IN: 0 mL / OUT: 1400 mL / NET: -1400 mL      BMI (kg/m2): 30.2 (05-29-24 @ 18:09)    PHYSICAL EXAM:  GENERAL: NAD  HEENT:  AT/NC, anicteric, moist mucous membranes, EOMI, PERRL conjunctiva and sclera clear  CHEST/LUNG: diminished breath sounds at bases, overall CTA b/l, no rales, wheezes, or rhonchi,  normal respiratory effort, no intercostal retractions  HEART:  IRRR, S1, S2, no murmurs; no pitting edema  ABDOMEN:  BS+, soft, nontender, nondistended  MSK/EXTREMITIES: palpable peripheral pulses, no clubbing or cyanosis dressing c/d/i  NERVOUS SYSTEM: answers questions and follows commands appropriately, A&Ox3 grossly moves all extremities   PSYCH: Appropriate affect, Alert & Awake; Good judgement      LABS: Personally reviewed  CBC                        12.7   9.87  )-----------( 333      ( 31 May 2024 07:48 )             40.3     CMP  06-01    139  |  106  |  11  ----------------------------<  224  3.8   |  25  |  1.00    Ca    9.1      01 Jun 2024 08:15    TPro  6.9  /  Alb  2.3  /  TBili  0.5  /  DBili  x   /  AST  18  /  ALT  14  /  AlkPhos  108  05-31                                  POCT Blood Glucose.: 140 mg/dL (01 Jun 2024 16:56)  POCT Blood Glucose.: 172 mg/dL (01 Jun 2024 12:07)  POCT Blood Glucose.: 211 mg/dL (01 Jun 2024 08:03)  POCT Blood Glucose.: 220 mg/dL (31 May 2024 21:29)      Urinalysis Basic - ( 01 Jun 2024 08:15 )    Color: x / Appearance: x / SG: x / pH: x  Gluc: 224 mg/dL / Ketone: x  / Bili: x / Urobili: x   Blood: x / Protein: x / Nitrite: x   Leuk Esterase: x / RBC: x / WBC x   Sq Epi: x / Non Sq Epi: x / Bacteria: x                RADIOLOGY & ADDITIONAL TESTS: Personally reviewed.     Consultant(s) Notes Reviewed:  [x] YES  [ ] NO   Discussed with SW/ZANDRA, RN

## 2024-06-01 NOTE — PROGRESS NOTE ADULT - SUBJECTIVE AND OBJECTIVE BOX
[facility] -- Brittany Lutz Pannella, Patel, Savella Goodger, Cohen  Office # [phone]      Follow Up:  cardiac optimization     Subjective/Observations:   No events overnight resting comfortably in bed.  No complaints of chest pain, dyspnea, or palpitations reported. No signs of orthopnea or PND.  remains on room air     REVIEW OF SYSTEMS: All other review of systems is negative unless indicated above    PAST MEDICAL & SURGICAL HISTORY:  Diabetes      Diabetes mellitus with no complication      Afib      Hypertension      BPH (benign prostatic hyperplasia)      Perforated gastric ulcer  s/p emergent ex-lap omentopexy and plication 2019      Pulmonary embolism      History of non-ST elevation myocardial infarction (NSTEMI)      Osteomyelitis  s/p debridement      CAD S/P percutaneous coronary angioplasty      Cerebrovascular accident      H/O abdominal surgery      Perforated gastric ulcer      Traumatic amputation of left foot, initial encounter          MEDICATIONS  (STANDING):  ascorbic acid 500 milliGRAM(s) Oral daily  atorvastatin 40 milliGRAM(s) Oral at bedtime  cholecalciferol 1000 Unit(s) Oral daily  dextrose 50% Injectable 25 Gram(s) IV Push once  dextrose 50% Injectable 12.5 Gram(s) IV Push once  dextrose 50% Injectable 25 Gram(s) IV Push once  dextrose Oral Gel 15 Gram(s) Oral once  diltiazem    Tablet 90 milliGRAM(s) Oral every 6 hours  famotidine    Tablet 20 milliGRAM(s) Oral daily  ferrous    sulfate 325 milliGRAM(s) Oral two times a day  furosemide   Injectable 40 milliGRAM(s) IV Push two times a day  gabapentin 300 milliGRAM(s) Oral every 12 hours  glucagon  Injectable 1 milliGRAM(s) IntraMuscular once  heparin   Injectable 5000 Unit(s) SubCutaneous every 12 hours  insulin lispro (ADMELOG) corrective regimen sliding scale   SubCutaneous three times a day before meals  insulin lispro (ADMELOG) corrective regimen sliding scale   SubCutaneous at bedtime  loratadine 10 milliGRAM(s) Oral daily  melatonin 5 milliGRAM(s) Oral at bedtime  metoclopramide 5 milliGRAM(s) Oral three times a day  metoprolol tartrate 25 milliGRAM(s) Oral every 6 hours  multivitamin/minerals 1 Tablet(s) Oral daily  naloxegol 25 milliGRAM(s) Oral daily  oxybutynin 10 milliGRAM(s) Oral two times a day  oxyCODONE    IR 10 milliGRAM(s) Oral two times a day  polyethylene glycol 3350 17 Gram(s) Oral daily  potassium chloride    Tablet ER 10 milliEquivalent(s) Oral four times a day  senna 2 Tablet(s) Oral at bedtime  sucralfate 1 Gram(s) Oral two times a day  tiotropium 2.5 MICROgram(s) Inhaler 2 Puff(s) Inhalation daily  triamcinolone 0.1% Cream 1 Application(s) Topical every 6 hours    MEDICATIONS  (PRN):  acetaminophen     Tablet .. 650 milliGRAM(s) Oral every 6 hours PRN Temp greater or equal to 38C (100.4F), Mild Pain (1 - 3)  aluminum hydroxide/magnesium hydroxide/simethicone Suspension 30 milliLiter(s) Oral every 4 hours PRN Dyspepsia  ondansetron Injectable 4 milliGRAM(s) IV Push every 8 hours PRN Nausea and/or Vomiting  sodium chloride 0.65% Nasal 1 Spray(s) Both Nostrils three times a day PRN Nasal Congestion      Allergies    No Known Drug Allergies  fish (Hives)    Intolerances        Vital Signs Last 24 Hrs  T(C): 36.6 (2024 04:47), Max: 37.2 (31 May 2024 20:47)  T(F): 97.8 (2024 04:47), Max: 98.9 (31 May 2024 20:47)  HR: 81 (2024 04:47) (65 - 98)  BP: 123/84 (2024 04:47) (99/60 - 127/72)  BP(mean): --  RR: 18 (2024 04:47) (16 - 18)  SpO2: 94% (2024 04:47) (91% - 94%)    Parameters below as of 2024 04:47  Patient On (Oxygen Delivery Method): room air        I&O's Summary    31 May 2024 07:01  -  2024 07:00  --------------------------------------------------------  IN: 300 mL / OUT: 2500 mL / NET: -2200 mL          PHYSICAL EXAM:  TELE: AF  Constitutional: NAD, awake and alert, well-developed  HEENT: Moist Mucous Membranes, Anicteric  Pulmonary: Non-labored, breath sounds are clear bilaterally, No wheezing, crackles or rhonchi  Cardiovascular: IRRegular, S1 and S2 nl, murmur   Gastrointestinal: Bowel Sounds present, soft, nontender.   Lymph: No lymphadenopathy. No peripheral edema.  Skin:bl foot dressing, arm rash reports chronic   Psych:  Mood & affect appropriate    LABS: All Labs Reviewed:                        12.7   9.87  )-----------( 333      ( 31 May 2024 07:48 )             40.3                         12.6   9.99  )-----------( 329      ( 30 May 2024 17:50 )             39.8     31 May 2024 07:48    140    |  103    |  8      ----------------------------<  190    3.3     |  31     |  0.94   30 May 2024 17:50    142    |  106    |  7      ----------------------------<  185    3.2     |  32     |  0.85     Ca    8.8        31 May 2024 07:48  Ca    8.9        30 May 2024 17:50    TPro  6.9    /  Alb  2.3    /  TBili  0.5    /  DBili  x      /  AST  18     /  ALT  14     /  AlkPhos  108    31 May 2024 07:48  TPro  7.0    /  Alb  2.4    /  TBili  0.5    /  DBili  x      /  AST  12     /  ALT  13     /  AlkPhos  108    30 May 2024 17:50             EC Lead ECG:   Ventricular Rate 127 BPM    QRS Duration 82 ms    Q-T Interval 276 ms    QTC Calculation(Bazett) 401 ms    R Axis 13 degrees    T Axis 5 degrees    Diagnosis Line Atrial fibrillation with rapid ventricular response with premature ventricular or aberrantly conducted complexes  Low voltage QRS  Cannot rule out Anterior infarct , age undetermined  Abnormal ECG    Confirmed by darien France ([id number]) on 2024 2:53:37 PM (24 @ 11:53)      TRANSTHORACIC ECHOCARDIOGRAM REPORT  ________________________________________________________________________________                                      _______       Pt. Name:       SARAH MORRISON Study Date:    3/18/2024  MRN:            [id number]         YOB: 1970  Accession #:    [id number]        Age:           53 years  Account#:       [id number]       Gender:        M  Heart Rate:                      Height:        74.02 in (188.00 cm)  Rhythm:                          Weight:        244.71 lb (111.00 kg)  Blood Pressure: 100/60 mmHg      BSA/BMI:       2.37 m² / 31.41 kg/m²  ________________________________________________________________________________________  Referring Physician:    [id number] Hill Brizuela  Interpreting Physician: Mary Maya MD  Primary Sonographer:    Mounika FELDMAN    CPT:               ECHO TTE WO CON COMP W DOPP - 28126.m  Indication(s):     Dyspnea, unspecified - R06.00  Procedure:         Transthoracic echocardiogram with 2-D, M-mode and complete                     spectral and color flow Doppler.  Ordering Location: [facility]  Admission Status:  Inpatient  Study Information: Image quality for this study is technically difficult.    _______________________________________________________________________________________     CONCLUSIONS:      1. Technically difficult image quality.   2. Left ventricular cavity is normal in size. Left ventricular wall thickness is normal. Left ventricular systolic function is normal with an ejection fraction visually estimated at 50 to 55 %.   3. Normal right ventricular cavity size, with normal wall thickness, and mildly reduced systolic function.   4. The left atrium is moderately dilated.   5. The right atrium is moderately dilated.   6. Moderate to severe mitral regurgitation.   7. Mild to moderate tricuspid regurgitation.   8. Estimated pulmonary artery systolic pressure is 36 mmHg, consistent with mild pulmonary hypertension.   9. The inferior vena cava is dilated measuring 2.40 cm in diameter, (dilated >2.1cm) with normal inspiratory collapse (normal >50%) consistent with mildly elevated right atrial pressure (~8, range 5-10mmHg).  10. Trace pericardial effusion.    ________________________________________________________________________________________  FINDINGS:     Left Ventricle:  The left ventricular cavity is normal in size. Left ventricular wall thickness is normal. Left ventricular systolic function is normal with an ejection fraction visually estimated at 50 to 55%.The left ventricular diastolic function is indeterminate.     Right Ventricle:  The right ventricular cavity is normal in size, with normal wall thickness and mildly reduced systolic function.     Left Atrium:  The left atrium is moderately dilated.     Right Atrium:  The right atrium is moderately dilated.     Aortic Valve:  The aortic valve anatomy cannot be determined with normal systolic excursion. There is no aortic valve stenosis.     Mitral Valve:  There is mild calcification of the mitralvalve annulus. There is moderate to severe mitral regurgitation.     Tricuspid Valve:  Structurally normal tricuspid valve with normal leaflet excursion. There is mild to moderate tricuspid regurgitation. Estimated pulmonary artery systolic pressure is 36 mmHg, consistent with mild pulmonary hypertension.     Pulmonic Valve:  The pulmonic valve was not well visualized. There is trace pulmonic regurgitation.     Pericardium:  There is a trace pericardial effusion.     Systemic Veins:  The inferiorvena cava is dilated measuring 2.40 cm in diameter, (dilated >2.1cm) with normal inspiratory collapse (normal >50%) consistent with mildly elevated right atrial pressure (~8, range 5-10mmHg).  ____________________________________________________________________  QUANTITATIVE DATA:  Left Ventricle Measurements: (Indexed to BSA)     IVSd (2D):   1.9 cm  LVPWd (2D):  1.5 cm  LVIDd (2D):  5.3 cm  LVIDs (2D):  3.9 cm  LV Mass:     413 g  174.4 g/m²  Visualized LV EF%: 50 to 55%     MV E Vmax:    1.15 m/s  e' lateral:   10.90 cm/s  e' medial:    10.50 cm/s  E/e' lateral: 10.56  E/e' medial:  12.00  E/e' Average: 10.76  MV DT:        137 msec    Aorta Measurements: (Normal range) (Indexed to BSA)     Sinuses of Valsalva: 3.50 cm (3.1 - 3.7 cm)       Left Atrium Measurements: (Indexed to BSA)  LA Diam 2D: 3.99 cm       LVOT / RVOT/ Qp/Qs Data: (Indexed to BSA)  LVOT Diameter: 2.22 cm    Mitral Valve Measurements:     MV E Vmax: 1.2 m/s       Tricuspid Valve Measurements:     TR Vmax:          2.6m/s  TR Peak Gradient: 27.7 mmHg  RA Pressure:      8 mmHg  PASP:             36 mmHg    ________________________________________________________________________________________  Electronically signed on 3/19/2024 at 11:27:59 AM by Mary Maya MD         *** Final ***      Radiology:         [facility] -- Brittany Lutz Pannella, Patel, Savella Goodger, Cohen  Office # [phone]      Follow Up:  cardiac optimization     Subjective/Observations:   No events overnight resting comfortably in bed.  No complaints of chest pain, dyspnea, or palpitations reported. No signs of orthopnea or PND.  remains on room air     REVIEW OF SYSTEMS: All other review of systems is negative unless indicated above    PAST MEDICAL & SURGICAL HISTORY:  Diabetes      Diabetes mellitus with no complication      Afib      Hypertension      BPH (benign prostatic hyperplasia)      Perforated gastric ulcer  s/p emergent ex-lap omentopexy and plication 2019      Pulmonary embolism      History of non-ST elevation myocardial infarction (NSTEMI)      Osteomyelitis  s/p debridement      CAD S/P percutaneous coronary angioplasty      Cerebrovascular accident      H/O abdominal surgery      Perforated gastric ulcer      Traumatic amputation of left foot, initial encounter          MEDICATIONS  (STANDING):  ascorbic acid 500 milliGRAM(s) Oral daily  atorvastatin 40 milliGRAM(s) Oral at bedtime  cholecalciferol 1000 Unit(s) Oral daily  dextrose 50% Injectable 25 Gram(s) IV Push once  dextrose 50% Injectable 12.5 Gram(s) IV Push once  dextrose 50% Injectable 25 Gram(s) IV Push once  dextrose Oral Gel 15 Gram(s) Oral once  diltiazem    Tablet 90 milliGRAM(s) Oral every 6 hours  famotidine    Tablet 20 milliGRAM(s) Oral daily  ferrous    sulfate 325 milliGRAM(s) Oral two times a day  furosemide   Injectable 40 milliGRAM(s) IV Push two times a day  gabapentin 300 milliGRAM(s) Oral every 12 hours  glucagon  Injectable 1 milliGRAM(s) IntraMuscular once  heparin   Injectable 5000 Unit(s) SubCutaneous every 12 hours  insulin lispro (ADMELOG) corrective regimen sliding scale   SubCutaneous three times a day before meals  insulin lispro (ADMELOG) corrective regimen sliding scale   SubCutaneous at bedtime  loratadine 10 milliGRAM(s) Oral daily  melatonin 5 milliGRAM(s) Oral at bedtime  metoclopramide 5 milliGRAM(s) Oral three times a day  metoprolol tartrate 25 milliGRAM(s) Oral every 6 hours  multivitamin/minerals 1 Tablet(s) Oral daily  naloxegol 25 milliGRAM(s) Oral daily  oxybutynin 10 milliGRAM(s) Oral two times a day  oxyCODONE    IR 10 milliGRAM(s) Oral two times a day  polyethylene glycol 3350 17 Gram(s) Oral daily  potassium chloride    Tablet ER 10 milliEquivalent(s) Oral four times a day  senna 2 Tablet(s) Oral at bedtime  sucralfate 1 Gram(s) Oral two times a day  tiotropium 2.5 MICROgram(s) Inhaler 2 Puff(s) Inhalation daily  triamcinolone 0.1% Cream 1 Application(s) Topical every 6 hours    MEDICATIONS  (PRN):  acetaminophen     Tablet .. 650 milliGRAM(s) Oral every 6 hours PRN Temp greater or equal to 38C (100.4F), Mild Pain (1 - 3)  aluminum hydroxide/magnesium hydroxide/simethicone Suspension 30 milliLiter(s) Oral every 4 hours PRN Dyspepsia  ondansetron Injectable 4 milliGRAM(s) IV Push every 8 hours PRN Nausea and/or Vomiting  sodium chloride 0.65% Nasal 1 Spray(s) Both Nostrils three times a day PRN Nasal Congestion      Allergies    No Known Drug Allergies  fish (Hives)    Intolerances        Vital Signs Last 24 Hrs  T(C): 36.6 (2024 04:47), Max: 37.2 (31 May 2024 20:47)  T(F): 97.8 (2024 04:47), Max: 98.9 (31 May 2024 20:47)  HR: 81 (2024 04:47) (65 - 98)  BP: 123/84 (2024 04:47) (99/60 - 127/72)  BP(mean): --  RR: 18 (2024 04:47) (16 - 18)  SpO2: 94% (2024 04:47) (91% - 94%)    Parameters below as of 2024 04:47  Patient On (Oxygen Delivery Method): room air        I&O's Summary    31 May 2024 07:01  -  2024 07:00  --------------------------------------------------------  IN: 300 mL / OUT: 2500 mL / NET: -2200 mL          PHYSICAL EXAM:  TELE: AF  Constitutional: NAD, awake and alert, well-developed  HEENT: Moist Mucous Membranes, Anicteric  Pulmonary: Non-labored, breath sounds are clear bilaterally, No wheezing, crackles or rhonchi  Cardiovascular: IRRegular, S1 and S2 nl, murmur   Gastrointestinal: Bowel Sounds present, soft, nontender.   Lymph: No lymphadenopathy. No peripheral edema.  Skin:bl foot dressing, arm rash reports chronic   Psych:  Mood & affect appropriate    LABS: All Labs Reviewed:                        12.7   9.87  )-----------( 333      ( 31 May 2024 07:48 )             40.3                         12.6   9.99  )-----------( 329      ( 30 May 2024 17:50 )             39.8     31 May 2024 07:48    140    |  103    |  8      ----------------------------<  190    3.3     |  31     |  0.94   30 May 2024 17:50    142    |  106    |  7      ----------------------------<  185    3.2     |  32     |  0.85     Ca    8.8        31 May 2024 07:48  Ca    8.9        30 May 2024 17:50    TPro  6.9    /  Alb  2.3    /  TBili  0.5    /  DBili  x      /  AST  18     /  ALT  14     /  AlkPhos  108    31 May 2024 07:48  TPro  7.0    /  Alb  2.4    /  TBili  0.5    /  DBili  x      /  AST  12     /  ALT  13     /  AlkPhos  108    30 May 2024 17:50             EC Lead ECG:   Ventricular Rate 127 BPM    QRS Duration 82 ms    Q-T Interval 276 ms    QTC Calculation(Bazett) 401 ms    R Axis 13 degrees    T Axis 5 degrees    Diagnosis Line Atrial fibrillation with rapid ventricular response with premature ventricular or aberrantly conducted complexes  Low voltage QRS  Cannot rule out Anterior infarct , age undetermined  Abnormal ECG    Confirmed by darien France ([id number]) on 2024 2:53:37 PM (24 @ 11:53)      TRANSTHORACIC ECHOCARDIOGRAM REPORT  ________________________________________________________________________________                                      _______       Pt. Name:       SARAH MORRISON Study Date:    3/18/2024  MRN:            [id number]         YOB: 1970  Accession #:    [id number]        Age:           53 years  Account#:       [id number]       Gender:        M  Heart Rate:                      Height:        74.02 in (188.00 cm)  Rhythm:                          Weight:        244.71 lb (111.00 kg)  Blood Pressure: 100/60 mmHg      BSA/BMI:       2.37 m² / 31.41 kg/m²  ________________________________________________________________________________________  Referring Physician:    [id number] Hill Brizuela  Interpreting Physician: Mary Maya MD  Primary Sonographer:    Mounika FELDMAN    CPT:               ECHO TTE WO CON COMP W DOPP - 07535.m  Indication(s):     Dyspnea, unspecified - R06.00  Procedure:         Transthoracic echocardiogram with 2-D, M-mode and complete                     spectral and color flow Doppler.  Ordering Location: [facility]  Admission Status:  Inpatient  Study Information: Image quality for this study is technically difficult.    _______________________________________________________________________________________     CONCLUSIONS:      1. Technically difficult image quality.   2. Left ventricular cavity is normal in size. Left ventricular wall thickness is normal. Left ventricular systolic function is normal with an ejection fraction visually estimated at 50 to 55 %.   3. Normal right ventricular cavity size, with normal wall thickness, and mildly reduced systolic function.   4. The left atrium is moderately dilated.   5. The right atrium is moderately dilated.   6. Moderate to severe mitral regurgitation.   7. Mild to moderate tricuspid regurgitation.   8. Estimated pulmonary artery systolic pressure is 36 mmHg, consistent with mild pulmonary hypertension.   9. The inferior vena cava is dilated measuring 2.40 cm in diameter, (dilated >2.1cm) with normal inspiratory collapse (normal >50%) consistent with mildly elevated right atrial pressure (~8, range 5-10mmHg).  10. Trace pericardial effusion.    ________________________________________________________________________________________  FINDINGS:     Left Ventricle:  The left ventricular cavity is normal in size. Left ventricular wall thickness is normal. Left ventricular systolic function is normal with an ejection fraction visually estimated at 50 to 55%.The left ventricular diastolic function is indeterminate.     Right Ventricle:  The right ventricular cavity is normal in size, with normal wall thickness and mildly reduced systolic function.     Left Atrium:  The left atrium is moderately dilated.     Right Atrium:  The right atrium is moderately dilated.     Aortic Valve:  The aortic valve anatomy cannot be determined with normal systolic excursion. There is no aortic valve stenosis.     Mitral Valve:  There is mild calcification of the mitralvalve annulus. There is moderate to severe mitral regurgitation.     Tricuspid Valve:  Structurally normal tricuspid valve with normal leaflet excursion. There is mild to moderate tricuspid regurgitation. Estimated pulmonary artery systolic pressure is 36 mmHg, consistent with mild pulmonary hypertension.     Pulmonic Valve:  The pulmonic valve was not well visualized. There is trace pulmonic regurgitation.     Pericardium:  There is a trace pericardial effusion.     Systemic Veins:  The inferiorvena cava is dilated measuring 2.40 cm in diameter, (dilated >2.1cm) with normal inspiratory collapse (normal >50%) consistent with mildly elevated right atrial pressure (~8, range 5-10mmHg).  ____________________________________________________________________  QUANTITATIVE DATA:  Left Ventricle Measurements: (Indexed to BSA)     IVSd (2D):   1.9 cm  LVPWd (2D):  1.5 cm  LVIDd (2D):  5.3 cm  LVIDs (2D):  3.9 cm  LV Mass:     413 g  174.4 g/m²  Visualized LV EF%: 50 to 55%     MV E Vmax:    1.15 m/s  e' lateral:   10.90 cm/s  e' medial:    10.50 cm/s  E/e' lateral: 10.56  E/e' medial:  12.00  E/e' Average: 10.76  MV DT:        137 msec    Aorta Measurements: (Normal range) (Indexed to BSA)     Sinuses of Valsalva: 3.50 cm (3.1 - 3.7 cm)       Left Atrium Measurements: (Indexed to BSA)  LA Diam 2D: 3.99 cm       LVOT / RVOT/ Qp/Qs Data: (Indexed to BSA)  LVOT Diameter: 2.22 cm    Mitral Valve Measurements:     MV E Vmax: 1.2 m/s       Tricuspid Valve Measurements:     TR Vmax:          2.6m/s  TR Peak Gradient: 27.7 mmHg  RA Pressure:      8 mmHg  PASP:             36 mmHg    ________________________________________________________________________________________  Electronically signed on 3/19/2024 at 11:27:59 AM by Mary Maya MD         *** Final ***      Radiology:

## 2024-06-01 NOTE — PROGRESS NOTE ADULT - ASSESSMENT
53M w/ PMHx of Afib, CAD s/p stents, CVA, DM, HTN, pulmonary embolism on Eliquis,  osteomyelitis s/p debridement presenting  presents to emergency department from [facility] for R heel ulcer debridement.    Afib RVR, CAD, HTN  - Known  Afib, initially RVR.  s/p Dig load.  Rates are now controlled  - Continue Cardizem 90 mg PO Q6H  - Hold home Eliquis in anticipation of Podia procedure  - Continue Metoprolol changed to every 6 hours   - Continue telemetry monitoring for now  -  Hold on further doses of Digoxin  - Had pauses last admission, so would use caution.  No pauses noted on tele  -can check dig level tomorrow     - 3/18/24 Technically difficult ECHO w/ normal LV size and function EF 50-55%, mildly reduced RV systolic function, mod dilated LA and RA, mod to sev MR, mild to mod TR, PASP 36  - can change to home po Torsemide   - Monitor strict I/Os and daily weight    - EKG showed A fib RVR, PVC @ 127.   - No evidence of any active ischemia   - Holding aspirin for procedure-  Resume postop  - Continue statin     - BP stable and controlled     -AM labs pending if K > 4.0 Plan for  surgical debridement of the right heel with bone biopsy, possible partial calcanectomy  - Pt has no active ischemia, decompensated heart failure, unstable arrythmia.  He has a known mod-severe MR but no clear volume overload except for mild edema.  Patient is optimized at best from cardiovascular standpoint to proceed with planned procedure with routine hemodynamic monitoring pending repletion of serum K (3.3)    - Monitor and replete lytes, keep K>4, Mg>2.  - Will continue to follow.   53M w/ PMHx of Afib, CAD s/p stents, CVA, DM, HTN, pulmonary embolism on Eliquis,  osteomyelitis s/p debridement presenting  presents to emergency department from [facility] for R heel ulcer debridement.    Afib RVR, CAD, HTN  - Known  Afib, initially RVR.  s/p Dig load.  Rates are now controlled  - Continue Cardizem 90 mg PO Q6H  - Hold home Eliquis in anticipation of Podia procedure  - Continue Metoprolol changed to every 6 hours   - Continue telemetry monitoring for now  -  Hold on further doses of Digoxin  - Had pauses last admission, so would use caution.  No pauses noted on tele  -can check dig level tomorrow     - 3/18/24 Technically difficult ECHO w/ normal LV size and function EF 50-55%, mildly reduced RV systolic function, mod dilated LA and RA, mod to sev MR, mild to mod TR, PASP 36  - Stop IV lasix and change to home po Torsemide (20mg PO BID)  - Monitor strict I/Os and daily weight    - EKG showed A fib RVR, PVC @ 127.   - No evidence of any active ischemia   - Holding aspirin for procedure-  Resume postop  - Continue statin     - BP stable and controlled     Plan for  surgical debridement of the right heel with bone biopsy, possible partial calcanectomy  - Pt has no active ischemia, decompensated heart failure, unstable arrythmia.  He has a known mod-severe MR but no clear volume overload except for mild edema.  Patient is optimized at best from cardiovascular standpoint to proceed with planned procedure with routine hemodynamic monitoring     - Monitor and replete lytes, keep K>4, Mg>2.  - Will continue to follow.

## 2024-06-02 LAB
ANION GAP SERPL CALC-SCNC: 2 MMOL/L — LOW (ref 5–17)
BUN SERPL-MCNC: 14 MG/DL — SIGNIFICANT CHANGE UP (ref 7–23)
CALCIUM SERPL-MCNC: 9.2 MG/DL — SIGNIFICANT CHANGE UP (ref 8.5–10.1)
CHLORIDE SERPL-SCNC: 105 MMOL/L — SIGNIFICANT CHANGE UP (ref 96–108)
CO2 SERPL-SCNC: 32 MMOL/L — HIGH (ref 22–31)
CREAT SERPL-MCNC: 1.1 MG/DL — SIGNIFICANT CHANGE UP (ref 0.5–1.3)
EGFR: 79 ML/MIN/1.73M2 — SIGNIFICANT CHANGE UP
GLUCOSE BLDC GLUCOMTR-MCNC: 209 MG/DL — HIGH (ref 70–99)
GLUCOSE BLDC GLUCOMTR-MCNC: 252 MG/DL — HIGH (ref 70–99)
GLUCOSE BLDC GLUCOMTR-MCNC: 294 MG/DL — HIGH (ref 70–99)
GLUCOSE BLDC GLUCOMTR-MCNC: 296 MG/DL — HIGH (ref 70–99)
GLUCOSE SERPL-MCNC: 242 MG/DL — HIGH (ref 70–99)
HCT VFR BLD CALC: 40.3 % — SIGNIFICANT CHANGE UP (ref 39–50)
HGB BLD-MCNC: 12.8 G/DL — LOW (ref 13–17)
MCHC RBC-ENTMCNC: 30.8 PG — SIGNIFICANT CHANGE UP (ref 27–34)
MCHC RBC-ENTMCNC: 31.8 GM/DL — LOW (ref 32–36)
MCV RBC AUTO: 96.9 FL — SIGNIFICANT CHANGE UP (ref 80–100)
NRBC # BLD: 0 /100 WBCS — SIGNIFICANT CHANGE UP (ref 0–0)
PLATELET # BLD AUTO: 333 K/UL — SIGNIFICANT CHANGE UP (ref 150–400)
POTASSIUM SERPL-MCNC: 4 MMOL/L — SIGNIFICANT CHANGE UP (ref 3.5–5.3)
POTASSIUM SERPL-SCNC: 4 MMOL/L — SIGNIFICANT CHANGE UP (ref 3.5–5.3)
RBC # BLD: 4.16 M/UL — LOW (ref 4.2–5.8)
RBC # FLD: 14.6 % — HIGH (ref 10.3–14.5)
SODIUM SERPL-SCNC: 139 MMOL/L — SIGNIFICANT CHANGE UP (ref 135–145)
WBC # BLD: 12.41 K/UL — HIGH (ref 3.8–10.5)
WBC # FLD AUTO: 12.41 K/UL — HIGH (ref 3.8–10.5)

## 2024-06-02 PROCEDURE — 99232 SBSQ HOSP IP/OBS MODERATE 35: CPT

## 2024-06-02 RX ORDER — DIPHENHYDRAMINE HCL 50 MG
25 CAPSULE ORAL EVERY 6 HOURS
Refills: 0 | Status: DISCONTINUED | OUTPATIENT
Start: 2024-06-02 | End: 2024-06-04

## 2024-06-02 RX ORDER — MORPHINE SULFATE 50 MG/1
2 CAPSULE, EXTENDED RELEASE ORAL EVERY 6 HOURS
Refills: 0 | Status: DISCONTINUED | OUTPATIENT
Start: 2024-06-02 | End: 2024-06-04

## 2024-06-02 RX ORDER — INSULIN LISPRO 100/ML
VIAL (ML) SUBCUTANEOUS
Refills: 0 | Status: DISCONTINUED | OUTPATIENT
Start: 2024-06-02 | End: 2024-06-04

## 2024-06-02 RX ORDER — INSULIN LISPRO 100/ML
VIAL (ML) SUBCUTANEOUS AT BEDTIME
Refills: 0 | Status: DISCONTINUED | OUTPATIENT
Start: 2024-06-02 | End: 2024-06-04

## 2024-06-02 RX ADMIN — Medication 1 APPLICATION(S): at 23:15

## 2024-06-02 RX ADMIN — Medication 25 MILLIGRAM(S): at 23:15

## 2024-06-02 RX ADMIN — Medication 90 MILLIGRAM(S): at 23:15

## 2024-06-02 RX ADMIN — HEPARIN SODIUM 5000 UNIT(S): 5000 INJECTION INTRAVENOUS; SUBCUTANEOUS at 05:38

## 2024-06-02 RX ADMIN — Medication 3: at 07:45

## 2024-06-02 RX ADMIN — FAMOTIDINE 20 MILLIGRAM(S): 10 INJECTION INTRAVENOUS at 12:52

## 2024-06-02 RX ADMIN — GABAPENTIN 300 MILLIGRAM(S): 400 CAPSULE ORAL at 05:39

## 2024-06-02 RX ADMIN — Medication 325 MILLIGRAM(S): at 17:35

## 2024-06-02 RX ADMIN — MORPHINE SULFATE 2 MILLIGRAM(S): 50 CAPSULE, EXTENDED RELEASE ORAL at 21:11

## 2024-06-02 RX ADMIN — Medication 25 MILLIGRAM(S): at 12:52

## 2024-06-02 RX ADMIN — Medication 10 MILLIGRAM(S): at 05:39

## 2024-06-02 RX ADMIN — SENNA PLUS 2 TABLET(S): 8.6 TABLET ORAL at 21:11

## 2024-06-02 RX ADMIN — Medication 1 GRAM(S): at 17:36

## 2024-06-02 RX ADMIN — Medication 325 MILLIGRAM(S): at 05:39

## 2024-06-02 RX ADMIN — Medication 10 MILLIEQUIVALENT(S): at 05:39

## 2024-06-02 RX ADMIN — Medication 10 MILLIGRAM(S): at 17:36

## 2024-06-02 RX ADMIN — Medication 500 MILLIGRAM(S): at 12:52

## 2024-06-02 RX ADMIN — Medication 6: at 17:35

## 2024-06-02 RX ADMIN — Medication 1 APPLICATION(S): at 17:37

## 2024-06-02 RX ADMIN — Medication 10 MILLIEQUIVALENT(S): at 12:52

## 2024-06-02 RX ADMIN — NALOXEGOL OXALATE 25 MILLIGRAM(S): 12.5 TABLET, FILM COATED ORAL at 12:55

## 2024-06-02 RX ADMIN — Medication 25 MILLIGRAM(S): at 17:37

## 2024-06-02 RX ADMIN — Medication 20 MILLIGRAM(S): at 12:52

## 2024-06-02 RX ADMIN — ATORVASTATIN CALCIUM 40 MILLIGRAM(S): 80 TABLET, FILM COATED ORAL at 21:11

## 2024-06-02 RX ADMIN — LORATADINE 10 MILLIGRAM(S): 10 TABLET ORAL at 21:11

## 2024-06-02 RX ADMIN — HEPARIN SODIUM 5000 UNIT(S): 5000 INJECTION INTRAVENOUS; SUBCUTANEOUS at 17:35

## 2024-06-02 RX ADMIN — Medication 1 GRAM(S): at 05:38

## 2024-06-02 RX ADMIN — Medication 1000 UNIT(S): at 12:51

## 2024-06-02 RX ADMIN — OXYCODONE HYDROCHLORIDE 10 MILLIGRAM(S): 5 TABLET ORAL at 05:39

## 2024-06-02 RX ADMIN — OXYCODONE HYDROCHLORIDE 10 MILLIGRAM(S): 5 TABLET ORAL at 17:36

## 2024-06-02 RX ADMIN — Medication 1 TABLET(S): at 12:51

## 2024-06-02 RX ADMIN — Medication 5 MILLIGRAM(S): at 21:11

## 2024-06-02 RX ADMIN — Medication 90 MILLIGRAM(S): at 05:39

## 2024-06-02 RX ADMIN — TIOTROPIUM BROMIDE 2 PUFF(S): 18 CAPSULE ORAL; RESPIRATORY (INHALATION) at 05:38

## 2024-06-02 RX ADMIN — OXYCODONE HYDROCHLORIDE 10 MILLIGRAM(S): 5 TABLET ORAL at 06:14

## 2024-06-02 RX ADMIN — Medication 90 MILLIGRAM(S): at 17:36

## 2024-06-02 RX ADMIN — Medication 10 MILLIEQUIVALENT(S): at 17:36

## 2024-06-02 RX ADMIN — Medication 5 MILLIGRAM(S): at 12:51

## 2024-06-02 RX ADMIN — Medication 25 MILLIGRAM(S): at 05:40

## 2024-06-02 RX ADMIN — Medication 1 APPLICATION(S): at 05:40

## 2024-06-02 RX ADMIN — Medication 5 MILLIGRAM(S): at 05:38

## 2024-06-02 RX ADMIN — GABAPENTIN 300 MILLIGRAM(S): 400 CAPSULE ORAL at 17:36

## 2024-06-02 RX ADMIN — Medication 90 MILLIGRAM(S): at 12:52

## 2024-06-02 RX ADMIN — Medication 1 APPLICATION(S): at 12:53

## 2024-06-02 RX ADMIN — Medication 10 MILLIEQUIVALENT(S): at 23:15

## 2024-06-02 RX ADMIN — Medication 20 MILLIGRAM(S): at 05:39

## 2024-06-02 RX ADMIN — MORPHINE SULFATE 2 MILLIGRAM(S): 50 CAPSULE, EXTENDED RELEASE ORAL at 22:02

## 2024-06-02 NOTE — PROGRESS NOTE ADULT - SUBJECTIVE AND OBJECTIVE BOX
Patient is a 53y old  Male who presents with a chief complaint of ankle wound (02 Jun 2024 11:45)      INTERVAL HPI/OVERNIGHT EVENTS: Patient seen and examined at bedside. No overnight events.  OR Tuesday  Pain persisting would like stronger medication  reports intermittent itching requesting benadryl    MEDICATIONS  (STANDING):  ascorbic acid 500 milliGRAM(s) Oral daily  atorvastatin 40 milliGRAM(s) Oral at bedtime  cholecalciferol 1000 Unit(s) Oral daily  dextrose 50% Injectable 25 Gram(s) IV Push once  dextrose 50% Injectable 12.5 Gram(s) IV Push once  dextrose 50% Injectable 25 Gram(s) IV Push once  dextrose Oral Gel 15 Gram(s) Oral once  diltiazem    Tablet 90 milliGRAM(s) Oral every 6 hours  famotidine    Tablet 20 milliGRAM(s) Oral daily  ferrous    sulfate 325 milliGRAM(s) Oral two times a day  gabapentin 300 milliGRAM(s) Oral every 12 hours  glucagon  Injectable 1 milliGRAM(s) IntraMuscular once  heparin   Injectable 5000 Unit(s) SubCutaneous every 12 hours  insulin lispro (ADMELOG) corrective regimen sliding scale   SubCutaneous three times a day before meals  insulin lispro (ADMELOG) corrective regimen sliding scale   SubCutaneous at bedtime  loratadine 10 milliGRAM(s) Oral daily  melatonin 5 milliGRAM(s) Oral at bedtime  metoclopramide 5 milliGRAM(s) Oral three times a day  metoprolol tartrate 25 milliGRAM(s) Oral every 6 hours  multivitamin/minerals 1 Tablet(s) Oral daily  naloxegol 25 milliGRAM(s) Oral daily  oxybutynin 10 milliGRAM(s) Oral two times a day  oxyCODONE    IR 10 milliGRAM(s) Oral two times a day  polyethylene glycol 3350 17 Gram(s) Oral daily  potassium chloride    Tablet ER 10 milliEquivalent(s) Oral four times a day  senna 2 Tablet(s) Oral at bedtime  sucralfate 1 Gram(s) Oral two times a day  tiotropium 2.5 MICROgram(s) Inhaler 2 Puff(s) Inhalation daily  torsemide 20 milliGRAM(s) Oral two times a day  triamcinolone 0.1% Cream 1 Application(s) Topical every 6 hours    MEDICATIONS  (PRN):  acetaminophen     Tablet .. 650 milliGRAM(s) Oral every 6 hours PRN Temp greater or equal to 38C (100.4F), Mild Pain (1 - 3)  aluminum hydroxide/magnesium hydroxide/simethicone Suspension 30 milliLiter(s) Oral every 4 hours PRN Dyspepsia  diphenhydrAMINE 25 milliGRAM(s) Oral every 6 hours PRN Rash and/or Itching  morphine  - Injectable 2 milliGRAM(s) IV Push every 6 hours PRN Severe Pain (7 - 10)  ondansetron Injectable 4 milliGRAM(s) IV Push every 8 hours PRN Nausea and/or Vomiting  sodium chloride 0.65% Nasal 1 Spray(s) Both Nostrils three times a day PRN Nasal Congestion      Allergies    No Known Drug Allergies  fish (Hives)    Intolerances        REVIEW OF SYSTEMS:  CONSTITUTIONAL: No fever or chills  CARDIOVASCULAR: No chest pain, palpitations  GASTROINTESTINAL: No abd pain, nausea, vomiting    Vital Signs Last 24 Hrs  T(C): 36.8 (02 Jun 2024 19:57), Max: 37.3 (01 Jun 2024 23:25)  T(F): 98.3 (02 Jun 2024 19:57), Max: 99.1 (01 Jun 2024 23:25)  HR: 101 (02 Jun 2024 19:57) (76 - 101)  BP: 120/76 (02 Jun 2024 19:57) (116/72 - 144/64)  BP(mean): --  RR: 18 (02 Jun 2024 19:57) (17 - 18)  SpO2: 98% (02 Jun 2024 19:57) (92% - 98%)    Parameters below as of 02 Jun 2024 19:57  Patient On (Oxygen Delivery Method): room air      I&O's Summary    01 Jun 2024 07:01  -  02 Jun 2024 07:00  --------------------------------------------------------  IN: 0 mL / OUT: 2600 mL / NET: -2600 mL    02 Jun 2024 07:01  -  02 Jun 2024 22:57  --------------------------------------------------------  IN: 0 mL / OUT: 3800 mL / NET: -3800 mL      BMI (kg/m2): 30.2 (05-29-24 @ 18:09)    PHYSICAL EXAM:  GENERAL: NAD  HEENT:  AT/NC, anicteric, moist mucous membranes, EOMI, PERRL, no lid-lag, conjunctiva and sclera clear  CHEST/LUNG:  CTA b/l, no rales, wheezes, or rhonchi,  normal respiratory effort, no intercostal retractions  HEART:  RRR, S1, S2, no murmurs; no pitting edema  ABDOMEN:  BS+, soft, nontender, nondistended  MSK/EXTREMITIES: palpable peripheral pulses, no clubbing or cyanosis  NERVOUS SYSTEM: answers questions and follows commands appropriately, A&Ox3 grossly moves all extremities   PSYCH: Appropriate affect, Alert & Awake; Good judgement      LABS: Personally reviewed  CBC                        12.8   12.41 )-----------( 333      ( 02 Jun 2024 08:55 )             40.3     CMP  06-02    139  |  105  |  14  ----------------------------<  242  4.0   |  32  |  1.10    Ca    9.2      02 Jun 2024 08:55    TPro  6.9  /  Alb  2.3  /  TBili  0.5  /  DBili  x   /  AST  18  /  ALT  14  /  AlkPhos  108  05-31                                  POCT Blood Glucose.: 209 mg/dL (02 Jun 2024 21:53)  POCT Blood Glucose.: 296 mg/dL (02 Jun 2024 16:42)  POCT Blood Glucose.: 294 mg/dL (02 Jun 2024 11:50)  POCT Blood Glucose.: 252 mg/dL (02 Jun 2024 07:37)      Urinalysis Basic - ( 02 Jun 2024 08:55 )    Color: x / Appearance: x / SG: x / pH: x  Gluc: 242 mg/dL / Ketone: x  / Bili: x / Urobili: x   Blood: x / Protein: x / Nitrite: x   Leuk Esterase: x / RBC: x / WBC x   Sq Epi: x / Non Sq Epi: x / Bacteria: x                RADIOLOGY & ADDITIONAL TESTS: Personally reviewed.     Consultant(s) Notes Reviewed:  [x] YES  [ ] NO   Discussed with SW/ZANDRA, RN

## 2024-06-02 NOTE — PROGRESS NOTE ADULT - SUBJECTIVE AND OBJECTIVE BOX
CAPILLARY BLOOD GLUCOSE      POCT Blood Glucose.: 252 mg/dL (02 Jun 2024 07:37)  POCT Blood Glucose.: 240 mg/dL (01 Jun 2024 21:35)  POCT Blood Glucose.: 140 mg/dL (01 Jun 2024 16:56)  POCT Blood Glucose.: 172 mg/dL (01 Jun 2024 12:07)      Vital Signs Last 24 Hrs  T(C): 36.8 (02 Jun 2024 04:57), Max: 37.6 (01 Jun 2024 21:05)  T(F): 98.2 (02 Jun 2024 04:57), Max: 99.7 (01 Jun 2024 21:05)  HR: 77 (02 Jun 2024 04:57) (77 - 92)  BP: 126/82 (02 Jun 2024 04:57) (107/62 - 127/74)  BP(mean): --  RR: 18 (02 Jun 2024 04:57) (16 - 18)  SpO2: 92% (02 Jun 2024 04:57) (92% - 96%)    Parameters below as of 02 Jun 2024 04:57  Patient On (Oxygen Delivery Method): room air        General: WN/WD NAD  Respiratory: CTA B/L  CV: RRR, S1S2, no murmurs, rubs or gallops  Abdominal: Soft, NT, ND +BS, Last BM  Extremities: No edema, + peripheral pulses     06-02    139  |  105  |  14  ----------------------------<  242<H>  4.0   |  32<H>  |  1.10    Ca    9.2      02 Jun 2024 08:55        atorvastatin 40 milliGRAM(s) Oral at bedtime  dextrose 50% Injectable 25 Gram(s) IV Push once  dextrose 50% Injectable 12.5 Gram(s) IV Push once  dextrose 50% Injectable 25 Gram(s) IV Push once  dextrose Oral Gel 15 Gram(s) Oral once  glucagon  Injectable 1 milliGRAM(s) IntraMuscular once  insulin lispro (ADMELOG) corrective regimen sliding scale   SubCutaneous three times a day before meals  insulin lispro (ADMELOG) corrective regimen sliding scale   SubCutaneous at bedtime

## 2024-06-03 ENCOUNTER — TRANSCRIPTION ENCOUNTER (OUTPATIENT)
Age: 54
End: 2024-06-03

## 2024-06-03 LAB
ANION GAP SERPL CALC-SCNC: 5 MMOL/L — SIGNIFICANT CHANGE UP (ref 5–17)
BUN SERPL-MCNC: 17 MG/DL — SIGNIFICANT CHANGE UP (ref 7–23)
CALCIUM SERPL-MCNC: 9 MG/DL — SIGNIFICANT CHANGE UP (ref 8.5–10.1)
CHLORIDE SERPL-SCNC: 102 MMOL/L — SIGNIFICANT CHANGE UP (ref 96–108)
CO2 SERPL-SCNC: 32 MMOL/L — HIGH (ref 22–31)
CREAT SERPL-MCNC: 1.1 MG/DL — SIGNIFICANT CHANGE UP (ref 0.5–1.3)
EGFR: 79 ML/MIN/1.73M2 — SIGNIFICANT CHANGE UP
GLUCOSE BLDC GLUCOMTR-MCNC: 164 MG/DL — HIGH (ref 70–99)
GLUCOSE BLDC GLUCOMTR-MCNC: 219 MG/DL — HIGH (ref 70–99)
GLUCOSE BLDC GLUCOMTR-MCNC: 349 MG/DL — HIGH (ref 70–99)
GLUCOSE BLDC GLUCOMTR-MCNC: 374 MG/DL — HIGH (ref 70–99)
GLUCOSE BLDC GLUCOMTR-MCNC: 412 MG/DL — HIGH (ref 70–99)
GLUCOSE SERPL-MCNC: 232 MG/DL — HIGH (ref 70–99)
HCT VFR BLD CALC: 38.4 % — LOW (ref 39–50)
HGB BLD-MCNC: 12.2 G/DL — LOW (ref 13–17)
MCHC RBC-ENTMCNC: 30.3 PG — SIGNIFICANT CHANGE UP (ref 27–34)
MCHC RBC-ENTMCNC: 31.8 GM/DL — LOW (ref 32–36)
MCV RBC AUTO: 95.3 FL — SIGNIFICANT CHANGE UP (ref 80–100)
NRBC # BLD: 0 /100 WBCS — SIGNIFICANT CHANGE UP (ref 0–0)
PLATELET # BLD AUTO: 290 K/UL — SIGNIFICANT CHANGE UP (ref 150–400)
POTASSIUM SERPL-MCNC: 3.8 MMOL/L — SIGNIFICANT CHANGE UP (ref 3.5–5.3)
POTASSIUM SERPL-SCNC: 3.8 MMOL/L — SIGNIFICANT CHANGE UP (ref 3.5–5.3)
RBC # BLD: 4.03 M/UL — LOW (ref 4.2–5.8)
RBC # FLD: 14.3 % — SIGNIFICANT CHANGE UP (ref 10.3–14.5)
SODIUM SERPL-SCNC: 139 MMOL/L — SIGNIFICANT CHANGE UP (ref 135–145)
WBC # BLD: 10.78 K/UL — HIGH (ref 3.8–10.5)
WBC # FLD AUTO: 10.78 K/UL — HIGH (ref 3.8–10.5)

## 2024-06-03 PROCEDURE — 99232 SBSQ HOSP IP/OBS MODERATE 35: CPT

## 2024-06-03 RX ORDER — VANCOMYCIN HCL 1 G
1500 VIAL (EA) INTRAVENOUS EVERY 12 HOURS
Refills: 0 | Status: DISCONTINUED | OUTPATIENT
Start: 2024-06-03 | End: 2024-06-04

## 2024-06-03 RX ORDER — VANCOMYCIN HCL 1 G
1500 VIAL (EA) INTRAVENOUS ONCE
Refills: 0 | Status: COMPLETED | OUTPATIENT
Start: 2024-06-03 | End: 2024-06-03

## 2024-06-03 RX ORDER — VANCOMYCIN HCL 1 G
VIAL (EA) INTRAVENOUS
Refills: 0 | Status: DISCONTINUED | OUTPATIENT
Start: 2024-06-03 | End: 2024-06-04

## 2024-06-03 RX ADMIN — Medication 1 TABLET(S): at 11:40

## 2024-06-03 RX ADMIN — Medication 90 MILLIGRAM(S): at 11:41

## 2024-06-03 RX ADMIN — Medication 4: at 08:34

## 2024-06-03 RX ADMIN — Medication 10 MILLIEQUIVALENT(S): at 17:01

## 2024-06-03 RX ADMIN — HEPARIN SODIUM 5000 UNIT(S): 5000 INJECTION INTRAVENOUS; SUBCUTANEOUS at 05:20

## 2024-06-03 RX ADMIN — Medication 1 APPLICATION(S): at 06:32

## 2024-06-03 RX ADMIN — MORPHINE SULFATE 2 MILLIGRAM(S): 50 CAPSULE, EXTENDED RELEASE ORAL at 06:32

## 2024-06-03 RX ADMIN — Medication 25 MILLIGRAM(S): at 11:39

## 2024-06-03 RX ADMIN — Medication 25 MILLIGRAM(S): at 23:33

## 2024-06-03 RX ADMIN — Medication 1 APPLICATION(S): at 23:40

## 2024-06-03 RX ADMIN — OXYCODONE HYDROCHLORIDE 10 MILLIGRAM(S): 5 TABLET ORAL at 22:02

## 2024-06-03 RX ADMIN — Medication 10 MILLIEQUIVALENT(S): at 05:20

## 2024-06-03 RX ADMIN — Medication 25 MILLIGRAM(S): at 17:01

## 2024-06-03 RX ADMIN — TIOTROPIUM BROMIDE 2 PUFF(S): 18 CAPSULE ORAL; RESPIRATORY (INHALATION) at 06:32

## 2024-06-03 RX ADMIN — LORATADINE 10 MILLIGRAM(S): 10 TABLET ORAL at 22:03

## 2024-06-03 RX ADMIN — Medication 10 MILLIGRAM(S): at 05:21

## 2024-06-03 RX ADMIN — Medication 1 GRAM(S): at 05:21

## 2024-06-03 RX ADMIN — POLYETHYLENE GLYCOL 3350 17 GRAM(S): 17 POWDER, FOR SOLUTION ORAL at 11:40

## 2024-06-03 RX ADMIN — Medication 10 MILLIEQUIVALENT(S): at 23:33

## 2024-06-03 RX ADMIN — MORPHINE SULFATE 2 MILLIGRAM(S): 50 CAPSULE, EXTENDED RELEASE ORAL at 14:15

## 2024-06-03 RX ADMIN — NALOXEGOL OXALATE 25 MILLIGRAM(S): 12.5 TABLET, FILM COATED ORAL at 13:42

## 2024-06-03 RX ADMIN — Medication 5 MILLIGRAM(S): at 11:39

## 2024-06-03 RX ADMIN — OXYCODONE HYDROCHLORIDE 10 MILLIGRAM(S): 5 TABLET ORAL at 21:10

## 2024-06-03 RX ADMIN — Medication 25 MILLIGRAM(S): at 05:20

## 2024-06-03 RX ADMIN — Medication 90 MILLIGRAM(S): at 05:21

## 2024-06-03 RX ADMIN — Medication 25 MILLIGRAM(S): at 13:10

## 2024-06-03 RX ADMIN — Medication 5 MILLIGRAM(S): at 22:02

## 2024-06-03 RX ADMIN — ATORVASTATIN CALCIUM 40 MILLIGRAM(S): 80 TABLET, FILM COATED ORAL at 22:02

## 2024-06-03 RX ADMIN — FAMOTIDINE 20 MILLIGRAM(S): 10 INJECTION INTRAVENOUS at 11:40

## 2024-06-03 RX ADMIN — Medication 25 MILLIGRAM(S): at 05:21

## 2024-06-03 RX ADMIN — Medication 325 MILLIGRAM(S): at 17:03

## 2024-06-03 RX ADMIN — OXYCODONE HYDROCHLORIDE 10 MILLIGRAM(S): 5 TABLET ORAL at 06:19

## 2024-06-03 RX ADMIN — Medication 90 MILLIGRAM(S): at 17:01

## 2024-06-03 RX ADMIN — MORPHINE SULFATE 2 MILLIGRAM(S): 50 CAPSULE, EXTENDED RELEASE ORAL at 13:16

## 2024-06-03 RX ADMIN — Medication 20 MILLIGRAM(S): at 13:11

## 2024-06-03 RX ADMIN — MORPHINE SULFATE 2 MILLIGRAM(S): 50 CAPSULE, EXTENDED RELEASE ORAL at 23:34

## 2024-06-03 RX ADMIN — Medication 8: at 12:26

## 2024-06-03 RX ADMIN — MORPHINE SULFATE 2 MILLIGRAM(S): 50 CAPSULE, EXTENDED RELEASE ORAL at 06:58

## 2024-06-03 RX ADMIN — HEPARIN SODIUM 5000 UNIT(S): 5000 INJECTION INTRAVENOUS; SUBCUTANEOUS at 17:01

## 2024-06-03 RX ADMIN — Medication 5 MILLIGRAM(S): at 22:03

## 2024-06-03 RX ADMIN — Medication 10: at 17:05

## 2024-06-03 RX ADMIN — Medication 300 MILLIGRAM(S): at 13:41

## 2024-06-03 RX ADMIN — Medication 5 MILLIGRAM(S): at 05:20

## 2024-06-03 RX ADMIN — Medication 1 GRAM(S): at 17:04

## 2024-06-03 RX ADMIN — GABAPENTIN 300 MILLIGRAM(S): 400 CAPSULE ORAL at 17:02

## 2024-06-03 RX ADMIN — GABAPENTIN 300 MILLIGRAM(S): 400 CAPSULE ORAL at 05:20

## 2024-06-03 RX ADMIN — Medication 90 MILLIGRAM(S): at 23:33

## 2024-06-03 RX ADMIN — Medication 300 MILLIGRAM(S): at 22:03

## 2024-06-03 RX ADMIN — Medication 10 MILLIEQUIVALENT(S): at 11:40

## 2024-06-03 RX ADMIN — Medication 500 MILLIGRAM(S): at 11:40

## 2024-06-03 RX ADMIN — OXYCODONE HYDROCHLORIDE 10 MILLIGRAM(S): 5 TABLET ORAL at 05:21

## 2024-06-03 RX ADMIN — Medication 325 MILLIGRAM(S): at 05:20

## 2024-06-03 RX ADMIN — Medication 20 MILLIGRAM(S): at 05:21

## 2024-06-03 RX ADMIN — Medication 1000 UNIT(S): at 11:40

## 2024-06-03 RX ADMIN — SENNA PLUS 2 TABLET(S): 8.6 TABLET ORAL at 22:02

## 2024-06-03 RX ADMIN — Medication 1 APPLICATION(S): at 11:52

## 2024-06-03 RX ADMIN — Medication 10 MILLIGRAM(S): at 17:01

## 2024-06-03 RX ADMIN — Medication 1 APPLICATION(S): at 17:04

## 2024-06-03 NOTE — CASE MANAGEMENT PROGRESS NOTE - NSCMPROGRESSNOTE_GEN_ALL_CORE
Discussed patient on rounds this morning and chart reviewed. Plan for right heel debridement tomorrow. SW following for transition planning back to [facility] when medically cleared. CM remains available.

## 2024-06-03 NOTE — SOCIAL WORK PROGRESS NOTE - NSSWPROGRESSNOTE_GEN_ALL_CORE
Per medical team, pt scheduled for debridement tomorrow 6/4. SHAWNA and clinical documents sent to [facility] for review. Pt is a LT resident. Will need to send information to [facility] for auth.    to remain available and to continue to follow up.

## 2024-06-03 NOTE — PROGRESS NOTE ADULT - ASSESSMENT
Pt is a 53M w/ PMHx of Afib, CAD, DM presenting for R heel ulcer debridement.   S/p zosyn x7 day course at facility  Being admitted for debridement  Prior WCx MRSA/E. faecalis from 3/26/24  Now w/ uptrending WBC and drainage from wound     Recommendations:   Start on vancomycin, goal trough 15-20, dosing per pharmacy protocol  OR tomorrow  --Please obtain cx and path  Appreciate podiatry/wound care  Further recs to follow pending above    Infectious Diseases will follow. Please call with any questions.  Anne-Marie Li M.D.  [facility] Division of Infectious Diseases [phone]  For after 5 P.M. and weekends, please call [phone] Pt is a 53M w/ PMHx of Afib, CAD, DM presenting for R heel ulcer debridement.   S/p zosyn x7 day course at facility  Being admitted for debridement  Prior WCx MRSA/E. faecalis from 3/26/24  Now w/ uptrending WBC and drainage from wound     Recommendations:   Obtain WCx from drainage and then  Start on vancomycin, goal trough 15-20, dosing per pharmacy protocol  OR tomorrow--Please obtain cx and path  Appreciate podiatry/wound care  Pain control, supportive measures and additional management per primary team  Further recs to follow pending above    D/w Dr. Borrero  Infectious Diseases will follow. Please call with any questions.  Anne-Marie Li M.D.  [facility] Division of Infectious Diseases [phone]  For after 5 P.M. and weekends, please call [phone]

## 2024-06-03 NOTE — PROGRESS NOTE ADULT - ASSESSMENT
53M w/ PMHx of Afib, CAD s/p stents, CVA, DM, HTN, pulmonary embolism on Eliquis,  osteomyelitis s/p debridement presenting  presents to emergency department from [facility] for R heel ulcer debridement.    Afib RVR, CAD, HTN  - Known  Afib, initially RVR.  s/p Dig load.  Rates are now controlled  - Continue Cardizem 90 mg PO Q6H  - Hold home Eliquis in anticipation of Podia procedure. resume post op when able  - Continue Metoprolol 25mg changed to every 6 hours   -  Hold on further doses of Digoxin. check dig level  - Had pauses last admission, so would use caution.  No pauses noted on tele. Dc tele    - 3/18/24 Technically difficult ECHO w/ normal LV size and function EF 50-55%, mildly reduced RV systolic function, mod dilated LA and RA, mod to sev MR, mild to mod TR, PASP 36  - appears euvolemic on exam, c/w torsemide  - Monitor strict I/Os and daily weight    - EKG showed A fib RVR, PVC @ 127.   - No evidence of any active ischemia   - Holding aspirin for procedure-  Resume postop  - Continue statin     - BP stable and controlled     -Plan for  surgical debridement of the right heel with bone biopsy, possible partial calcanectomy on 6/4  - Pt has no active ischemia, decompensated heart failure, unstable arrythmia.  He has a known mod-severe MR but no clear volume overload except for mild edema.  Patient is optimized at best from cardiovascular standpoint to proceed with planned procedure with routine hemodynamic monitoring     - Monitor and replete lytes, keep K>4, Mg>2.  - Will continue to follow.

## 2024-06-03 NOTE — PROGRESS NOTE ADULT - SUBJECTIVE AND OBJECTIVE BOX
[facility] -- Bolivar Carbajal Wachsman, Reynaldo Sweeney Savella, Goodger, Cohen  Office # [phone]    Follow Up:  Afib, HTN     Subjective/Observations: No events overnight resting comfortably in bed.  No complaints of chest pain, dyspnea, or palpitations reported. No signs of orthopnea or PND. on RA.   Diffuse rash noted on b/l UE    REVIEW OF SYSTEMS: All other review of systems is negative unless indicated above  PAST MEDICAL & SURGICAL HISTORY:  Diabetes      Diabetes mellitus with no complication      Afib      Hypertension      BPH (benign prostatic hyperplasia)      Perforated gastric ulcer  s/p emergent ex-lap omentopexy and plication 6/2019      Pulmonary embolism      History of non-ST elevation myocardial infarction (NSTEMI)      Osteomyelitis  s/p debridement      CAD S/P percutaneous coronary angioplasty      Cerebrovascular accident      H/O abdominal surgery      Perforated gastric ulcer      Traumatic amputation of left foot, initial encounter        MEDICATIONS  (STANDING):  ascorbic acid 500 milliGRAM(s) Oral daily  atorvastatin 40 milliGRAM(s) Oral at bedtime  cholecalciferol 1000 Unit(s) Oral daily  dextrose 50% Injectable 25 Gram(s) IV Push once  dextrose 50% Injectable 12.5 Gram(s) IV Push once  dextrose 50% Injectable 25 Gram(s) IV Push once  dextrose Oral Gel 15 Gram(s) Oral once  diltiazem    Tablet 90 milliGRAM(s) Oral every 6 hours  famotidine    Tablet 20 milliGRAM(s) Oral daily  ferrous    sulfate 325 milliGRAM(s) Oral two times a day  gabapentin 300 milliGRAM(s) Oral every 12 hours  glucagon  Injectable 1 milliGRAM(s) IntraMuscular once  heparin   Injectable 5000 Unit(s) SubCutaneous every 12 hours  insulin lispro (ADMELOG) corrective regimen sliding scale   SubCutaneous three times a day before meals  insulin lispro (ADMELOG) corrective regimen sliding scale   SubCutaneous at bedtime  loratadine 10 milliGRAM(s) Oral daily  melatonin 5 milliGRAM(s) Oral at bedtime  metoclopramide 5 milliGRAM(s) Oral three times a day  metoprolol tartrate 25 milliGRAM(s) Oral every 6 hours  multivitamin/minerals 1 Tablet(s) Oral daily  naloxegol 25 milliGRAM(s) Oral daily  oxybutynin 10 milliGRAM(s) Oral two times a day  oxyCODONE    IR 10 milliGRAM(s) Oral two times a day  polyethylene glycol 3350 17 Gram(s) Oral daily  potassium chloride    Tablet ER 10 milliEquivalent(s) Oral four times a day  senna 2 Tablet(s) Oral at bedtime  sucralfate 1 Gram(s) Oral two times a day  tiotropium 2.5 MICROgram(s) Inhaler 2 Puff(s) Inhalation daily  torsemide 20 milliGRAM(s) Oral two times a day  triamcinolone 0.1% Cream 1 Application(s) Topical every 6 hours    MEDICATIONS  (PRN):  acetaminophen     Tablet .. 650 milliGRAM(s) Oral every 6 hours PRN Temp greater or equal to 38C (100.4F), Mild Pain (1 - 3)  aluminum hydroxide/magnesium hydroxide/simethicone Suspension 30 milliLiter(s) Oral every 4 hours PRN Dyspepsia  diphenhydrAMINE 25 milliGRAM(s) Oral every 6 hours PRN Rash and/or Itching  morphine  - Injectable 2 milliGRAM(s) IV Push every 6 hours PRN Severe Pain (7 - 10)  ondansetron Injectable 4 milliGRAM(s) IV Push every 8 hours PRN Nausea and/or Vomiting  sodium chloride 0.65% Nasal 1 Spray(s) Both Nostrils three times a day PRN Nasal Congestion    Allergies    No Known Drug Allergies  fish (Hives)    Intolerances      Vital Signs Last 24 Hrs  T(C): 36.8 (03 Jun 2024 05:27), Max: 36.8 (02 Jun 2024 19:57)  T(F): 98.3 (03 Jun 2024 05:27), Max: 98.3 (02 Jun 2024 19:57)  HR: 93 (03 Jun 2024 05:27) (76 - 105)  BP: 118/64 (03 Jun 2024 05:27) (112/73 - 144/64)  BP(mean): --  RR: 18 (03 Jun 2024 05:27) (18 - 18)  SpO2: 93% (03 Jun 2024 05:27) (93% - 98%)    Parameters below as of 03 Jun 2024 05:27  Patient On (Oxygen Delivery Method): room air      I&O's Summary    02 Jun 2024 07:01  -  03 Jun 2024 07:00  --------------------------------------------------------  IN: 0 mL / OUT: 5300 mL / NET: -5300 mL        TELE: Afib, mostly rate controlled, episodes of nonsustained HR in 120s  PHYSICAL EXAM:  Constitutional: NAD, awake and alert  HEENT: Moist Mucous Membranes, Anicteric  Pulmonary: Non-labored, breath sounds are clear bilaterally, No wheezing, crackles or rhonchi  Cardiovascular: IRRegular, S1 and S2 nl, murmur   Gastrointestinal: Bowel Sounds present, soft, nontender.   Lymph: No lymphadenopathy. No peripheral edema.  Skin:bl foot dressing, b/l arm rash reports chronic   Psych:  Mood & affect appropriate  LABS: All Labs Reviewed:                        12.2   10.78 )-----------( 290      ( 03 Jun 2024 08:00 )             38.4                         12.8   12.41 )-----------( 333      ( 02 Jun 2024 08:55 )             40.3     02 Jun 2024 08:55    139    |  105    |  14     ----------------------------<  242    4.0     |  32     |  1.10   01 Jun 2024 08:15    139    |  106    |  11     ----------------------------<  224    3.8     |  25     |  1.00     Ca    9.2        02 Jun 2024 08:55  Ca    9.1        01 Jun 2024 08:15            12 Lead ECG:   Ventricular Rate 127 BPM    QRS Duration 82 ms    Q-T Interval 276 ms    QTC Calculation(Bazett) 401 ms    R Axis 13 degrees    T Axis 5 degrees    Diagnosis Line Atrial fibrillation with rapid ventricular response with premature ventricular or aberrantly conducted complexes  Low voltage QRS  Cannot rule out Anterior infarct , age undetermined  Abnormal ECG    Confirmed by darien France ([id number]) on 5/28/2024 2:53:37 PM (05-28-24 @ 11:53)       EXAM:  ECHO TTE WO CON COMP W DOPPLR         PROCEDURE DATE:  12/24/2019        INTERPRETATION:  INDICATION: Coronary artery disease    Blood Pressure 117/71    Height 187     Weight 93       BSA 2.2    Dimensions:    LA 4.2       Normal Values: 2.0 - 4.0 cm    Ao 4.0        Normal Values: 2.0 - 3.8 cm  SEPTUM 1.6       Normal Values: 0.6 - 1.2 cm  PWT 1.6       Normal Values: 0.6 - 1.1 cm  LVIDd 5.8         Normal Values: 3.0 - 5.6 cm  LVIDs 3.2         Normal Values: 1.8 - 4.0 cm    Derived Variables:  LVMI g/m2  RWT  Fractional Short  Ejection Fraction    Doppler Peak v. AoV= (m/sec)    OBSERVATIONS:    Mitral Valve: normal, mild MR.  Aortic Valve/Aorta: normal trileaflet aortic valve.  Tricuspid Valve: normal with trace TR.  Pulmonic Valve: normal  Left Atrium: Enlarged  Right Atrium: normal  Left Ventricle: Severe concentric LVH with normal systolic function, estimated LVEF of 65%.  Right Ventricle: normal size and systolic function.  Pericardium/Pleura: no significant pleural effusion, no significant pericardial effusion.  Pulmonary/RV Pressure: Inadequate TR jet to estimate PA systolic pressure  LV Diastolic Function: Grade 2diastolic dysfunction.    Severe concentric LVH, and mild LV enlargement, with normal systolic function, estimated LVEF of 65%. Normal right ventricular size and systolic function. Grade 2diastolic dysfunction. Left atrial enlargement The aortic root is normal in size. The mitral valve is structurally normal, mild MR. The aortic valve is trileaflet without stenosis or insufficiency. Trace physiologic TR. Inadequate TR jet to estimate PA systolic pressure No significant pericardial effusion.                  JOVANNI ALVAREZ M.D., ATTENDING CARDIOLOGIST  This document has been electronically signed. Dec 25 2019 11:45AM

## 2024-06-03 NOTE — PROGRESS NOTE ADULT - TIME BILLING
Reviewing chart notes and data, reviewing telemetry monitor records, face to face time counseling the patient, communicating with Dr. Poe podiatrist plan for OR Tuesday, coordinating care with SW/CM at [facility].
Reviewing chart notes and data, reviewing telemetry monitor records, face to face time counseling the patient, coordinating care with SW/CM at [facility].
Reviewing chart notes and data, reviewing telemetry monitor records, face to face time counseling the patient, coordinating care with SW/CM at [facility].

## 2024-06-03 NOTE — PROGRESS NOTE ADULT - SUBJECTIVE AND OBJECTIVE BOX
Patient is a 53y old  Male who presents with a chief complaint of ankle wound (03 Jun 2024 11:50)      INTERVAL HPI/OVERNIGHT EVENTS: Patient seen and examined at bedside. No overnight events.  Plan for OR tomorrow  Pain persists with drainage - IV Vancomycin added - discussed with ID Dr Li    MEDICATIONS  (STANDING):  ascorbic acid 500 milliGRAM(s) Oral daily  atorvastatin 40 milliGRAM(s) Oral at bedtime  cholecalciferol 1000 Unit(s) Oral daily  dextrose 50% Injectable 25 Gram(s) IV Push once  dextrose 50% Injectable 12.5 Gram(s) IV Push once  dextrose 50% Injectable 25 Gram(s) IV Push once  dextrose Oral Gel 15 Gram(s) Oral once  diltiazem    Tablet 90 milliGRAM(s) Oral every 6 hours  famotidine    Tablet 20 milliGRAM(s) Oral daily  ferrous    sulfate 325 milliGRAM(s) Oral two times a day  gabapentin 300 milliGRAM(s) Oral every 12 hours  glucagon  Injectable 1 milliGRAM(s) IntraMuscular once  heparin   Injectable 5000 Unit(s) SubCutaneous every 12 hours  insulin lispro (ADMELOG) corrective regimen sliding scale   SubCutaneous three times a day before meals  insulin lispro (ADMELOG) corrective regimen sliding scale   SubCutaneous at bedtime  loratadine 10 milliGRAM(s) Oral daily  melatonin 5 milliGRAM(s) Oral at bedtime  metoclopramide 5 milliGRAM(s) Oral three times a day  metoprolol tartrate 25 milliGRAM(s) Oral every 6 hours  multivitamin/minerals 1 Tablet(s) Oral daily  naloxegol 25 milliGRAM(s) Oral daily  oxybutynin 10 milliGRAM(s) Oral two times a day  oxyCODONE    IR 10 milliGRAM(s) Oral two times a day  polyethylene glycol 3350 17 Gram(s) Oral daily  potassium chloride    Tablet ER 10 milliEquivalent(s) Oral four times a day  senna 2 Tablet(s) Oral at bedtime  sucralfate 1 Gram(s) Oral two times a day  tiotropium 2.5 MICROgram(s) Inhaler 2 Puff(s) Inhalation daily  torsemide 20 milliGRAM(s) Oral two times a day  triamcinolone 0.1% Cream 1 Application(s) Topical every 6 hours  vancomycin  IVPB      vancomycin  IVPB 1500 milliGRAM(s) IV Intermittent every 12 hours    MEDICATIONS  (PRN):  acetaminophen     Tablet .. 650 milliGRAM(s) Oral every 6 hours PRN Temp greater or equal to 38C (100.4F), Mild Pain (1 - 3)  aluminum hydroxide/magnesium hydroxide/simethicone Suspension 30 milliLiter(s) Oral every 4 hours PRN Dyspepsia  diphenhydrAMINE 25 milliGRAM(s) Oral every 6 hours PRN Rash and/or Itching  morphine  - Injectable 2 milliGRAM(s) IV Push every 6 hours PRN Severe Pain (7 - 10)  ondansetron Injectable 4 milliGRAM(s) IV Push every 8 hours PRN Nausea and/or Vomiting  sodium chloride 0.65% Nasal 1 Spray(s) Both Nostrils three times a day PRN Nasal Congestion      Allergies    No Known Drug Allergies  fish (Hives)    Intolerances        REVIEW OF SYSTEMS:  CONSTITUTIONAL: No fever or chills  CARDIOVASCULAR: No chest pain, palpitations  GASTROINTESTINAL: No abd pain, nausea, vomiting    Vital Signs Last 24 Hrs  T(C): 36.8 (03 Jun 2024 05:27), Max: 36.8 (02 Jun 2024 19:57)  T(F): 98.3 (03 Jun 2024 05:27), Max: 98.3 (02 Jun 2024 19:57)  HR: 93 (03 Jun 2024 05:27) (85 - 105)  BP: 118/64 (03 Jun 2024 05:27) (112/73 - 144/64)  BP(mean): --  RR: 18 (03 Jun 2024 05:27) (18 - 18)  SpO2: 93% (03 Jun 2024 05:27) (93% - 98%)    Parameters below as of 03 Jun 2024 05:27  Patient On (Oxygen Delivery Method): room air      I&O's Summary    02 Jun 2024 07:01  -  03 Jun 2024 07:00  --------------------------------------------------------  IN: 0 mL / OUT: 5300 mL / NET: -5300 mL      BMI (kg/m2): 30.2 (05-29-24 @ 18:09)    PHYSICAL EXAM:  GENERAL: NAD  HEENT:  AT/NC, anicteric, moist mucous membranes, EOMI, PERRL, conjunctiva and sclera clear  CHEST/LUNG:  CTA b/l, no rales, wheezes, or rhonchi,  normal respiratory effort, no intercostal retractions  HEART:  RRR, S1, S2, no murmurs; no pitting edema  ABDOMEN:  BS+, soft, nontender, nondistended  MSK/EXTREMITIES: palpable peripheral pulses, no clubbing or cyanosis  NERVOUS SYSTEM: answers questions and follows commands appropriately, A&Ox3 grossly moves all extremities   PSYCH: Appropriate affect, Alert & Awake; Good judgement      LABS: Personally reviewed  CBC                        12.2   10.78 )-----------( 290      ( 03 Jun 2024 08:00 )             38.4     CMP  06-03    139  |  102  |  17  ----------------------------<  232  3.8   |  32  |  1.10    Ca    9.0      03 Jun 2024 08:00                                    POCT Blood Glucose.: 349 mg/dL (03 Jun 2024 12:21)  POCT Blood Glucose.: 219 mg/dL (03 Jun 2024 08:03)  POCT Blood Glucose.: 209 mg/dL (02 Jun 2024 21:53)  POCT Blood Glucose.: 296 mg/dL (02 Jun 2024 16:42)      Urinalysis Basic - ( 03 Jun 2024 08:00 )    Color: x / Appearance: x / SG: x / pH: x  Gluc: 232 mg/dL / Ketone: x  / Bili: x / Urobili: x   Blood: x / Protein: x / Nitrite: x   Leuk Esterase: x / RBC: x / WBC x   Sq Epi: x / Non Sq Epi: x / Bacteria: x                RADIOLOGY & ADDITIONAL TESTS: Personally reviewed.     Consultant(s) Notes Reviewed:  [x] YES  [ ] NO   Discussed with KAMLA/ZANDRA, RN

## 2024-06-03 NOTE — PROGRESS NOTE ADULT - SUBJECTIVE AND OBJECTIVE BOX
[facility], Division of Infectious Diseases  DELGADO Rodriguez Y. Patel, S. Shah, G. [facility]  [phone]    Name: SARAH MORRISON  Age: 53y  Gender: Male  MRN: [id number]    Interval History:  Patient seen and examined   No acute overnight events.   Afebrile  Notes reviewed    Antibiotics:      Medications:  acetaminophen     Tablet .. 650 milliGRAM(s) Oral every 6 hours PRN  aluminum hydroxide/magnesium hydroxide/simethicone Suspension 30 milliLiter(s) Oral every 4 hours PRN  ascorbic acid 500 milliGRAM(s) Oral daily  atorvastatin 40 milliGRAM(s) Oral at bedtime  cholecalciferol 1000 Unit(s) Oral daily  dextrose 50% Injectable 25 Gram(s) IV Push once  dextrose 50% Injectable 12.5 Gram(s) IV Push once  dextrose 50% Injectable 25 Gram(s) IV Push once  dextrose Oral Gel 15 Gram(s) Oral once  diltiazem    Tablet 90 milliGRAM(s) Oral every 6 hours  diphenhydrAMINE 25 milliGRAM(s) Oral every 6 hours PRN  famotidine    Tablet 20 milliGRAM(s) Oral daily  ferrous    sulfate 325 milliGRAM(s) Oral two times a day  gabapentin 300 milliGRAM(s) Oral every 12 hours  glucagon  Injectable 1 milliGRAM(s) IntraMuscular once  heparin   Injectable 5000 Unit(s) SubCutaneous every 12 hours  insulin lispro (ADMELOG) corrective regimen sliding scale   SubCutaneous three times a day before meals  insulin lispro (ADMELOG) corrective regimen sliding scale   SubCutaneous at bedtime  loratadine 10 milliGRAM(s) Oral daily  melatonin 5 milliGRAM(s) Oral at bedtime  metoclopramide 5 milliGRAM(s) Oral three times a day  metoprolol tartrate 25 milliGRAM(s) Oral every 6 hours  morphine  - Injectable 2 milliGRAM(s) IV Push every 6 hours PRN  multivitamin/minerals 1 Tablet(s) Oral daily  naloxegol 25 milliGRAM(s) Oral daily  ondansetron Injectable 4 milliGRAM(s) IV Push every 8 hours PRN  oxybutynin 10 milliGRAM(s) Oral two times a day  oxyCODONE    IR 10 milliGRAM(s) Oral two times a day  polyethylene glycol 3350 17 Gram(s) Oral daily  potassium chloride    Tablet ER 10 milliEquivalent(s) Oral four times a day  senna 2 Tablet(s) Oral at bedtime  sodium chloride 0.65% Nasal 1 Spray(s) Both Nostrils three times a day PRN  sucralfate 1 Gram(s) Oral two times a day  tiotropium 2.5 MICROgram(s) Inhaler 2 Puff(s) Inhalation daily  torsemide 20 milliGRAM(s) Oral two times a day  triamcinolone 0.1% Cream 1 Application(s) Topical every 6 hours      Review of Systems:  Review of systems otherwise negative except as previously noted.    Allergies: No Known Drug Allergies  fish (Hives)    For details regarding the patient's past medical history, social history, family history, and other miscellaneous elements, please refer the initial infectious diseases consultation and/or the admitting history and physical examination for this admission.    Objective:  Vitals:   T(C): 36.8 (06-03-24 @ 05:27), Max: 36.8 (06-02-24 @ 19:57)  HR: 93 (06-03-24 @ 05:27) (76 - 105)  BP: 118/64 (06-03-24 @ 05:27) (112/73 - 144/64)  RR: 18 (06-03-24 @ 05:27) (18 - 18)  SpO2: 93% (06-03-24 @ 05:27) (93% - 98%)    Physical Examination:  General: no acute distress  HEENT: NC/AT, EOMI,  Cardio: S1, S2 heard, RRR, no murmurs  Resp: breath sounds heard bilaterally, no rales, wheezes or rhonchi  Abd: soft, NT, ND  Ext: some swelling, pt reporting some drainage from wound  Skin: warm, dry, no visible rash      Laboratory Studies:  CBC:                       12.2   10.78 )-----------( 290      ( 03 Jun 2024 08:00 )             38.4     CMP: 06-03    139  |  102  |  17  ----------------------------<  232<H>  3.8   |  32<H>  |  1.10    Ca    9.0      03 Jun 2024 08:00        Urinalysis Basic - ( 03 Jun 2024 08:00 )    Color: x / Appearance: x / SG: x / pH: x  Gluc: 232 mg/dL / Ketone: x  / Bili: x / Urobili: x   Blood: x / Protein: x / Nitrite: x   Leuk Esterase: x / RBC: x / WBC x   Sq Epi: x / Non Sq Epi: x / Bacteria: x        Microbiology: reviewed        Radiology: reviewed    < from: US Duplex Venous Lower Ext Complete, Bilateral (05.29.24 @ 09:10) >    ACC: [id number] EXAM:  US DPLX LWR EXT VEINS COMPL BI   ORDERED BY: HANNAH REDDY     PROCEDURE DATE:  05/29/2024          INTERPRETATION:  CLINICAL INFORMATION: Lower extremity swelling    COMPARISON: None available.    TECHNIQUE: Duplex sonography of the BILATERAL LOWER extremity veins with   color and spectral Doppler, with and without compression.    FINDINGS: There is edema within the superficial soft tissues of both the   right and left lower extremities.    The right and the left common femoral, femoral and popliteal veins are   patent and free of thrombus.    The right and left calf veins were not diagnostically imaged.    IMPRESSION:    No evidence of deep venous thrombosis in either lower extremity.            --- End of Report ---            SHARON BARTLETT MD; Attending Interventional Radiologist  This document has been electronically signed. May 29 2024  9:42AM    < end of copied text >     [facility], Division of Infectious Diseases  DELGADO Rodriguez Y. Patel, S. Shah, G. [facility]  [phone]    Name: SARAH MORRISON  Age: 53y  Gender: Male  MRN: [id number]    Interval History:  Patient seen and examined   No acute overnight events.   Afebrile  Reporting some drainage from wound yesterday  Notes reviewed    Antibiotics:      Medications:  acetaminophen     Tablet .. 650 milliGRAM(s) Oral every 6 hours PRN  aluminum hydroxide/magnesium hydroxide/simethicone Suspension 30 milliLiter(s) Oral every 4 hours PRN  ascorbic acid 500 milliGRAM(s) Oral daily  atorvastatin 40 milliGRAM(s) Oral at bedtime  cholecalciferol 1000 Unit(s) Oral daily  dextrose 50% Injectable 25 Gram(s) IV Push once  dextrose 50% Injectable 12.5 Gram(s) IV Push once  dextrose 50% Injectable 25 Gram(s) IV Push once  dextrose Oral Gel 15 Gram(s) Oral once  diltiazem    Tablet 90 milliGRAM(s) Oral every 6 hours  diphenhydrAMINE 25 milliGRAM(s) Oral every 6 hours PRN  famotidine    Tablet 20 milliGRAM(s) Oral daily  ferrous    sulfate 325 milliGRAM(s) Oral two times a day  gabapentin 300 milliGRAM(s) Oral every 12 hours  glucagon  Injectable 1 milliGRAM(s) IntraMuscular once  heparin   Injectable 5000 Unit(s) SubCutaneous every 12 hours  insulin lispro (ADMELOG) corrective regimen sliding scale   SubCutaneous three times a day before meals  insulin lispro (ADMELOG) corrective regimen sliding scale   SubCutaneous at bedtime  loratadine 10 milliGRAM(s) Oral daily  melatonin 5 milliGRAM(s) Oral at bedtime  metoclopramide 5 milliGRAM(s) Oral three times a day  metoprolol tartrate 25 milliGRAM(s) Oral every 6 hours  morphine  - Injectable 2 milliGRAM(s) IV Push every 6 hours PRN  multivitamin/minerals 1 Tablet(s) Oral daily  naloxegol 25 milliGRAM(s) Oral daily  ondansetron Injectable 4 milliGRAM(s) IV Push every 8 hours PRN  oxybutynin 10 milliGRAM(s) Oral two times a day  oxyCODONE    IR 10 milliGRAM(s) Oral two times a day  polyethylene glycol 3350 17 Gram(s) Oral daily  potassium chloride    Tablet ER 10 milliEquivalent(s) Oral four times a day  senna 2 Tablet(s) Oral at bedtime  sodium chloride 0.65% Nasal 1 Spray(s) Both Nostrils three times a day PRN  sucralfate 1 Gram(s) Oral two times a day  tiotropium 2.5 MICROgram(s) Inhaler 2 Puff(s) Inhalation daily  torsemide 20 milliGRAM(s) Oral two times a day  triamcinolone 0.1% Cream 1 Application(s) Topical every 6 hours      Review of Systems:  Review of systems otherwise negative except as previously noted.    Allergies: No Known Drug Allergies  fish (Hives)    For details regarding the patient's past medical history, social history, family history, and other miscellaneous elements, please refer the initial infectious diseases consultation and/or the admitting history and physical examination for this admission.    Objective:  Vitals:   T(C): 36.8 (06-03-24 @ 05:27), Max: 36.8 (06-02-24 @ 19:57)  HR: 93 (06-03-24 @ 05:27) (76 - 105)  BP: 118/64 (06-03-24 @ 05:27) (112/73 - 144/64)  RR: 18 (06-03-24 @ 05:27) (18 - 18)  SpO2: 93% (06-03-24 @ 05:27) (93% - 98%)    Physical Examination:  General: no acute distress  HEENT: NC/AT, EOMI,  Cardio: S1, S2 heard, RRR, no murmurs  Resp: breath sounds heard bilaterally, no rales, wheezes or rhonchi  Abd: soft, NT, ND  Ext: some swelling, no erythema, pt reporting some drainage from wound  Skin: warm, dry, no visible rash      Laboratory Studies:  CBC:                       12.2   10.78 )-----------( 290      ( 03 Jun 2024 08:00 )             38.4     CMP: 06-03    139  |  102  |  17  ----------------------------<  232<H>  3.8   |  32<H>  |  1.10    Ca    9.0      03 Jun 2024 08:00        Urinalysis Basic - ( 03 Jun 2024 08:00 )    Color: x / Appearance: x / SG: x / pH: x  Gluc: 232 mg/dL / Ketone: x  / Bili: x / Urobili: x   Blood: x / Protein: x / Nitrite: x   Leuk Esterase: x / RBC: x / WBC x   Sq Epi: x / Non Sq Epi: x / Bacteria: x        Microbiology: reviewed        Radiology: reviewed    < from: US Duplex Venous Lower Ext Complete, Bilateral (05.29.24 @ 09:10) >    ACC: [id number] EXAM:  US DPLX LWR EXT VEINS COMPL BI   ORDERED BY: HANNAH REDDY     PROCEDURE DATE:  05/29/2024          INTERPRETATION:  CLINICAL INFORMATION: Lower extremity swelling    COMPARISON: None available.    TECHNIQUE: Duplex sonography of the BILATERAL LOWER extremity veins with   color and spectral Doppler, with and without compression.    FINDINGS: There is edema within the superficial soft tissues of both the   right and left lower extremities.    The right and the left common femoral, femoral and popliteal veins are   patent and free of thrombus.    The right and left calf veins were not diagnostically imaged.    IMPRESSION:    No evidence of deep venous thrombosis in either lower extremity.            --- End of Report ---            SHARON BARTLETT MD; Attending Interventional Radiologist  This document has been electronically signed. May 29 2024  9:42AM    < end of copied text >

## 2024-06-04 LAB
ANION GAP SERPL CALC-SCNC: 7 MMOL/L — SIGNIFICANT CHANGE UP (ref 5–17)
BUN SERPL-MCNC: 17 MG/DL — SIGNIFICANT CHANGE UP (ref 7–23)
CALCIUM SERPL-MCNC: 8.8 MG/DL — SIGNIFICANT CHANGE UP (ref 8.5–10.1)
CHLORIDE SERPL-SCNC: 98 MMOL/L — SIGNIFICANT CHANGE UP (ref 96–108)
CO2 SERPL-SCNC: 29 MMOL/L — SIGNIFICANT CHANGE UP (ref 22–31)
CREAT SERPL-MCNC: 0.95 MG/DL — SIGNIFICANT CHANGE UP (ref 0.5–1.3)
EGFR: 95 ML/MIN/1.73M2 — SIGNIFICANT CHANGE UP
GLUCOSE BLDC GLUCOMTR-MCNC: 118 MG/DL — HIGH (ref 70–99)
GLUCOSE BLDC GLUCOMTR-MCNC: 229 MG/DL — HIGH (ref 70–99)
GLUCOSE BLDC GLUCOMTR-MCNC: 245 MG/DL — HIGH (ref 70–99)
GLUCOSE BLDC GLUCOMTR-MCNC: 259 MG/DL — HIGH (ref 70–99)
GLUCOSE BLDC GLUCOMTR-MCNC: 286 MG/DL — HIGH (ref 70–99)
GLUCOSE BLDC GLUCOMTR-MCNC: 294 MG/DL — HIGH (ref 70–99)
GLUCOSE SERPL-MCNC: 334 MG/DL — HIGH (ref 70–99)
GRAM STN FLD: ABNORMAL
GRAM STN FLD: ABNORMAL
HCT VFR BLD CALC: 37.9 % — LOW (ref 39–50)
HGB BLD-MCNC: 12.6 G/DL — LOW (ref 13–17)
MCHC RBC-ENTMCNC: 31 PG — SIGNIFICANT CHANGE UP (ref 27–34)
MCHC RBC-ENTMCNC: 33.2 GM/DL — SIGNIFICANT CHANGE UP (ref 32–36)
MCV RBC AUTO: 93.1 FL — SIGNIFICANT CHANGE UP (ref 80–100)
NRBC # BLD: 0 /100 WBCS — SIGNIFICANT CHANGE UP (ref 0–0)
PLATELET # BLD AUTO: 296 K/UL — SIGNIFICANT CHANGE UP (ref 150–400)
POTASSIUM SERPL-MCNC: 3.4 MMOL/L — LOW (ref 3.5–5.3)
POTASSIUM SERPL-SCNC: 3.4 MMOL/L — LOW (ref 3.5–5.3)
RBC # BLD: 4.07 M/UL — LOW (ref 4.2–5.8)
RBC # FLD: 14.4 % — SIGNIFICANT CHANGE UP (ref 10.3–14.5)
SODIUM SERPL-SCNC: 134 MMOL/L — LOW (ref 135–145)
SPECIMEN SOURCE: SIGNIFICANT CHANGE UP
SPECIMEN SOURCE: SIGNIFICANT CHANGE UP
VANCOMYCIN TROUGH SERPL-MCNC: 22 UG/ML — HIGH (ref 10–20)
WBC # BLD: 11.3 K/UL — HIGH (ref 3.8–10.5)
WBC # FLD AUTO: 11.3 K/UL — HIGH (ref 3.8–10.5)

## 2024-06-04 PROCEDURE — 73630 X-RAY EXAM OF FOOT: CPT | Mod: 26,RT

## 2024-06-04 PROCEDURE — 88304 TISSUE EXAM BY PATHOLOGIST: CPT | Mod: 26

## 2024-06-04 PROCEDURE — 88311 DECALCIFY TISSUE: CPT | Mod: 26

## 2024-06-04 PROCEDURE — 11047 DBRDMT BONE EACH ADDL: CPT | Mod: NC

## 2024-06-04 PROCEDURE — 11044 DBRDMT BONE 1ST 20 SQ CM/<: CPT

## 2024-06-04 PROCEDURE — 99232 SBSQ HOSP IP/OBS MODERATE 35: CPT

## 2024-06-04 DEVICE — BONE FILLER BIOCOMPOSITE STIMULAN RAPID CURE 10CC: Type: IMPLANTABLE DEVICE | Site: RIGHT | Status: FUNCTIONAL

## 2024-06-04 RX ORDER — DIPHENHYDRAMINE HCL 50 MG
25 CAPSULE ORAL EVERY 6 HOURS
Refills: 0 | Status: DISCONTINUED | OUTPATIENT
Start: 2024-06-04 | End: 2024-06-07

## 2024-06-04 RX ORDER — DILTIAZEM HCL 120 MG
90 CAPSULE, EXT RELEASE 24 HR ORAL EVERY 6 HOURS
Refills: 0 | Status: DISCONTINUED | OUTPATIENT
Start: 2024-06-04 | End: 2024-06-05

## 2024-06-04 RX ORDER — LANOLIN ALCOHOL/MO/W.PET/CERES
5 CREAM (GRAM) TOPICAL AT BEDTIME
Refills: 0 | Status: DISCONTINUED | OUTPATIENT
Start: 2024-06-04 | End: 2024-06-13

## 2024-06-04 RX ORDER — INSULIN LISPRO 100/ML
VIAL (ML) SUBCUTANEOUS AT BEDTIME
Refills: 0 | Status: DISCONTINUED | OUTPATIENT
Start: 2024-06-04 | End: 2024-06-13

## 2024-06-04 RX ORDER — FERROUS SULFATE 325(65) MG
325 TABLET ORAL
Refills: 0 | Status: DISCONTINUED | OUTPATIENT
Start: 2024-06-04 | End: 2024-06-13

## 2024-06-04 RX ORDER — DEXTROSE 50 % IN WATER 50 %
15 SYRINGE (ML) INTRAVENOUS ONCE
Refills: 0 | Status: DISCONTINUED | OUTPATIENT
Start: 2024-06-04 | End: 2024-06-13

## 2024-06-04 RX ORDER — METOPROLOL TARTRATE 50 MG
25 TABLET ORAL EVERY 6 HOURS
Refills: 0 | Status: DISCONTINUED | OUTPATIENT
Start: 2024-06-04 | End: 2024-06-05

## 2024-06-04 RX ORDER — OXYBUTYNIN CHLORIDE 5 MG
10 TABLET ORAL
Refills: 0 | Status: DISCONTINUED | OUTPATIENT
Start: 2024-06-04 | End: 2024-06-13

## 2024-06-04 RX ORDER — VANCOMYCIN HCL 1 G
1000 VIAL (EA) INTRAVENOUS EVERY 12 HOURS
Refills: 0 | Status: DISCONTINUED | OUTPATIENT
Start: 2024-06-05 | End: 2024-06-11

## 2024-06-04 RX ORDER — INSULIN LISPRO 100/ML
VIAL (ML) SUBCUTANEOUS
Refills: 0 | Status: DISCONTINUED | OUTPATIENT
Start: 2024-06-04 | End: 2024-06-13

## 2024-06-04 RX ORDER — POTASSIUM CHLORIDE 20 MEQ
10 PACKET (EA) ORAL
Refills: 0 | Status: DISCONTINUED | OUTPATIENT
Start: 2024-06-04 | End: 2024-06-07

## 2024-06-04 RX ORDER — ONDANSETRON 8 MG/1
4 TABLET, FILM COATED ORAL ONCE
Refills: 0 | Status: DISCONTINUED | OUTPATIENT
Start: 2024-06-04 | End: 2024-06-04

## 2024-06-04 RX ORDER — ACETAMINOPHEN 500 MG
650 TABLET ORAL EVERY 6 HOURS
Refills: 0 | Status: DISCONTINUED | OUTPATIENT
Start: 2024-06-04 | End: 2024-06-13

## 2024-06-04 RX ORDER — ATORVASTATIN CALCIUM 80 MG/1
40 TABLET, FILM COATED ORAL AT BEDTIME
Refills: 0 | Status: DISCONTINUED | OUTPATIENT
Start: 2024-06-04 | End: 2024-06-13

## 2024-06-04 RX ORDER — VANCOMYCIN HCL 1 G
1500 VIAL (EA) INTRAVENOUS EVERY 12 HOURS
Refills: 0 | Status: DISCONTINUED | OUTPATIENT
Start: 2024-06-04 | End: 2024-06-04

## 2024-06-04 RX ORDER — MULTIVIT-MIN/FERROUS GLUCONATE 9 MG/15 ML
1 LIQUID (ML) ORAL DAILY
Refills: 0 | Status: DISCONTINUED | OUTPATIENT
Start: 2024-06-04 | End: 2024-06-13

## 2024-06-04 RX ORDER — NALOXEGOL OXALATE 12.5 MG/1
25 TABLET, FILM COATED ORAL DAILY
Refills: 0 | Status: DISCONTINUED | OUTPATIENT
Start: 2024-06-04 | End: 2024-06-13

## 2024-06-04 RX ORDER — GABAPENTIN 400 MG/1
300 CAPSULE ORAL EVERY 12 HOURS
Refills: 0 | Status: DISCONTINUED | OUTPATIENT
Start: 2024-06-04 | End: 2024-06-13

## 2024-06-04 RX ORDER — INSULIN LISPRO 100/ML
2 VIAL (ML) SUBCUTANEOUS ONCE
Refills: 0 | Status: COMPLETED | OUTPATIENT
Start: 2024-06-04 | End: 2024-06-04

## 2024-06-04 RX ORDER — GLUCAGON INJECTION, SOLUTION 0.5 MG/.1ML
1 INJECTION, SOLUTION SUBCUTANEOUS ONCE
Refills: 0 | Status: DISCONTINUED | OUTPATIENT
Start: 2024-06-04 | End: 2024-06-13

## 2024-06-04 RX ORDER — OXYCODONE HYDROCHLORIDE 5 MG/1
10 TABLET ORAL
Refills: 0 | Status: DISCONTINUED | OUTPATIENT
Start: 2024-06-04 | End: 2024-06-11

## 2024-06-04 RX ORDER — DEXTROSE 50 % IN WATER 50 %
25 SYRINGE (ML) INTRAVENOUS ONCE
Refills: 0 | Status: DISCONTINUED | OUTPATIENT
Start: 2024-06-04 | End: 2024-06-13

## 2024-06-04 RX ORDER — CHOLECALCIFEROL (VITAMIN D3) 125 MCG
1000 CAPSULE ORAL DAILY
Refills: 0 | Status: DISCONTINUED | OUTPATIENT
Start: 2024-06-04 | End: 2024-06-13

## 2024-06-04 RX ORDER — SODIUM CHLORIDE 0.65 %
1 AEROSOL, SPRAY (ML) NASAL THREE TIMES A DAY
Refills: 0 | Status: DISCONTINUED | OUTPATIENT
Start: 2024-06-04 | End: 2024-06-13

## 2024-06-04 RX ORDER — SODIUM CHLORIDE 9 MG/ML
1000 INJECTION, SOLUTION INTRAVENOUS
Refills: 0 | Status: DISCONTINUED | OUTPATIENT
Start: 2024-06-04 | End: 2024-06-04

## 2024-06-04 RX ORDER — FAMOTIDINE 10 MG/ML
20 INJECTION INTRAVENOUS DAILY
Refills: 0 | Status: DISCONTINUED | OUTPATIENT
Start: 2024-06-04 | End: 2024-06-05

## 2024-06-04 RX ORDER — TIOTROPIUM BROMIDE 18 UG/1
2 CAPSULE ORAL; RESPIRATORY (INHALATION) DAILY
Refills: 0 | Status: DISCONTINUED | OUTPATIENT
Start: 2024-06-04 | End: 2024-06-13

## 2024-06-04 RX ORDER — MORPHINE SULFATE 50 MG/1
2 CAPSULE, EXTENDED RELEASE ORAL EVERY 6 HOURS
Refills: 0 | Status: DISCONTINUED | OUTPATIENT
Start: 2024-06-04 | End: 2024-06-11

## 2024-06-04 RX ORDER — SENNA PLUS 8.6 MG/1
2 TABLET ORAL AT BEDTIME
Refills: 0 | Status: DISCONTINUED | OUTPATIENT
Start: 2024-06-04 | End: 2024-06-13

## 2024-06-04 RX ORDER — LORATADINE 10 MG/1
10 TABLET ORAL DAILY
Refills: 0 | Status: DISCONTINUED | OUTPATIENT
Start: 2024-06-04 | End: 2024-06-13

## 2024-06-04 RX ORDER — ASCORBIC ACID 60 MG
500 TABLET,CHEWABLE ORAL DAILY
Refills: 0 | Status: DISCONTINUED | OUTPATIENT
Start: 2024-06-04 | End: 2024-06-13

## 2024-06-04 RX ORDER — METOCLOPRAMIDE HCL 10 MG
5 TABLET ORAL THREE TIMES A DAY
Refills: 0 | Status: DISCONTINUED | OUTPATIENT
Start: 2024-06-04 | End: 2024-06-09

## 2024-06-04 RX ORDER — SUCRALFATE 1 G
1 TABLET ORAL
Refills: 0 | Status: DISCONTINUED | OUTPATIENT
Start: 2024-06-04 | End: 2024-06-13

## 2024-06-04 RX ORDER — DEXTROSE 50 % IN WATER 50 %
12.5 SYRINGE (ML) INTRAVENOUS ONCE
Refills: 0 | Status: DISCONTINUED | OUTPATIENT
Start: 2024-06-04 | End: 2024-06-13

## 2024-06-04 RX ORDER — HYDROMORPHONE HYDROCHLORIDE 2 MG/ML
0.5 INJECTION INTRAMUSCULAR; INTRAVENOUS; SUBCUTANEOUS
Refills: 0 | Status: DISCONTINUED | OUTPATIENT
Start: 2024-06-04 | End: 2024-06-04

## 2024-06-04 RX ORDER — HEPARIN SODIUM 5000 [USP'U]/ML
5000 INJECTION INTRAVENOUS; SUBCUTANEOUS EVERY 12 HOURS
Refills: 0 | Status: DISCONTINUED | OUTPATIENT
Start: 2024-06-04 | End: 2024-06-05

## 2024-06-04 RX ORDER — POLYETHYLENE GLYCOL 3350 17 G/17G
17 POWDER, FOR SOLUTION ORAL DAILY
Refills: 0 | Status: DISCONTINUED | OUTPATIENT
Start: 2024-06-04 | End: 2024-06-13

## 2024-06-04 RX ORDER — INSULIN LISPRO 100/ML
3 VIAL (ML) SUBCUTANEOUS ONCE
Refills: 0 | Status: COMPLETED | OUTPATIENT
Start: 2024-06-04 | End: 2024-06-04

## 2024-06-04 RX ORDER — ONDANSETRON 8 MG/1
4 TABLET, FILM COATED ORAL EVERY 8 HOURS
Refills: 0 | Status: DISCONTINUED | OUTPATIENT
Start: 2024-06-04 | End: 2024-06-13

## 2024-06-04 RX ADMIN — Medication 20 MILLIGRAM(S): at 06:30

## 2024-06-04 RX ADMIN — Medication 10 MILLIGRAM(S): at 17:19

## 2024-06-04 RX ADMIN — MORPHINE SULFATE 2 MILLIGRAM(S): 50 CAPSULE, EXTENDED RELEASE ORAL at 14:05

## 2024-06-04 RX ADMIN — Medication 25 MILLIGRAM(S): at 14:05

## 2024-06-04 RX ADMIN — LORATADINE 10 MILLIGRAM(S): 10 TABLET ORAL at 21:46

## 2024-06-04 RX ADMIN — Medication 1 GRAM(S): at 06:30

## 2024-06-04 RX ADMIN — Medication 20 MILLIGRAM(S): at 13:08

## 2024-06-04 RX ADMIN — OXYCODONE HYDROCHLORIDE 10 MILLIGRAM(S): 5 TABLET ORAL at 07:15

## 2024-06-04 RX ADMIN — Medication 325 MILLIGRAM(S): at 17:19

## 2024-06-04 RX ADMIN — OXYCODONE HYDROCHLORIDE 10 MILLIGRAM(S): 5 TABLET ORAL at 22:30

## 2024-06-04 RX ADMIN — MORPHINE SULFATE 2 MILLIGRAM(S): 50 CAPSULE, EXTENDED RELEASE ORAL at 15:05

## 2024-06-04 RX ADMIN — MORPHINE SULFATE 2 MILLIGRAM(S): 50 CAPSULE, EXTENDED RELEASE ORAL at 23:20

## 2024-06-04 RX ADMIN — Medication 325 MILLIGRAM(S): at 06:30

## 2024-06-04 RX ADMIN — SODIUM CHLORIDE 75 MILLILITER(S): 9 INJECTION, SOLUTION INTRAVENOUS at 10:55

## 2024-06-04 RX ADMIN — TIOTROPIUM BROMIDE 2 PUFF(S): 18 CAPSULE ORAL; RESPIRATORY (INHALATION) at 06:43

## 2024-06-04 RX ADMIN — Medication 1 APPLICATION(S): at 06:30

## 2024-06-04 RX ADMIN — Medication 25 MILLIGRAM(S): at 06:29

## 2024-06-04 RX ADMIN — GABAPENTIN 300 MILLIGRAM(S): 400 CAPSULE ORAL at 17:18

## 2024-06-04 RX ADMIN — Medication 25 MILLIGRAM(S): at 06:44

## 2024-06-04 RX ADMIN — Medication 3 UNIT(S): at 10:54

## 2024-06-04 RX ADMIN — Medication 1 GRAM(S): at 17:19

## 2024-06-04 RX ADMIN — Medication 10 MILLIEQUIVALENT(S): at 17:22

## 2024-06-04 RX ADMIN — Medication 90 MILLIGRAM(S): at 21:45

## 2024-06-04 RX ADMIN — SENNA PLUS 2 TABLET(S): 8.6 TABLET ORAL at 21:47

## 2024-06-04 RX ADMIN — Medication 5 MILLIGRAM(S): at 21:46

## 2024-06-04 RX ADMIN — Medication 5 MILLIGRAM(S): at 13:06

## 2024-06-04 RX ADMIN — Medication 10 MILLIEQUIVALENT(S): at 06:30

## 2024-06-04 RX ADMIN — ATORVASTATIN CALCIUM 40 MILLIGRAM(S): 80 TABLET, FILM COATED ORAL at 21:46

## 2024-06-04 RX ADMIN — GABAPENTIN 300 MILLIGRAM(S): 400 CAPSULE ORAL at 06:29

## 2024-06-04 RX ADMIN — Medication 25 MILLIGRAM(S): at 21:46

## 2024-06-04 RX ADMIN — Medication 10 MILLIGRAM(S): at 06:29

## 2024-06-04 RX ADMIN — Medication 2 UNIT(S): at 09:28

## 2024-06-04 RX ADMIN — Medication 25 MILLIGRAM(S): at 17:22

## 2024-06-04 RX ADMIN — OXYCODONE HYDROCHLORIDE 10 MILLIGRAM(S): 5 TABLET ORAL at 21:46

## 2024-06-04 RX ADMIN — MORPHINE SULFATE 2 MILLIGRAM(S): 50 CAPSULE, EXTENDED RELEASE ORAL at 00:25

## 2024-06-04 RX ADMIN — OXYCODONE HYDROCHLORIDE 10 MILLIGRAM(S): 5 TABLET ORAL at 06:29

## 2024-06-04 RX ADMIN — Medication 90 MILLIGRAM(S): at 06:29

## 2024-06-04 RX ADMIN — Medication 90 MILLIGRAM(S): at 13:06

## 2024-06-04 RX ADMIN — FAMOTIDINE 20 MILLIGRAM(S): 10 INJECTION INTRAVENOUS at 06:44

## 2024-06-04 RX ADMIN — HEPARIN SODIUM 5000 UNIT(S): 5000 INJECTION INTRAVENOUS; SUBCUTANEOUS at 17:18

## 2024-06-04 RX ADMIN — Medication 4: at 12:38

## 2024-06-04 RX ADMIN — Medication 5 MILLIGRAM(S): at 06:28

## 2024-06-04 RX ADMIN — Medication 300 MILLIGRAM(S): at 07:06

## 2024-06-04 NOTE — PHARMACOTHERAPY INTERVENTION NOTE - COMMENTS
Vancomycin Dosing Per Pharmacy  Patient is a 53 year old male being treated for a right heel debridement on IV vancomycin 1500mg Q12. Recommended drawing a trough on 6/4 at 1800. MD requested pharmacy to dose. Trough resulted at 22 with a current AUC/BAYLEE of 737. Dose reduced to 1000mg Q12 to begining at 06:00 on 6/5 for an anticipated 24H AUC/BAYLEE of 498.     1. Indication for the vancomycin: right heel debridement   2. Requesting Provider: Dr. Anne-Marie Li   3. Current plan and dosing regimen: 1500mg Q12   4. Day of therapy: 2  5. Cultures: tissue culture 5/26/24: E faecalis (susceptible to vancomycin) and MRSA  6. Target trough or AUC/BAYLEE: 400-600  7. Day and time level is due: 06/06 1700   8. Previous levels: 06/04/24 (19:31): 22  9. Expected 24-hour AUC: 768   Vancomycin Dosing Per Pharmacy  Patient is a 53 year old male being treated for a right heel woundon IV vancomycin 1500mg Q12. Recommended drawing a trough on 6/4 at 1800. MD requested pharmacy to dose. Trough resulted at 22 with a current AUC/BAYLEE of 737. Dose reduced to 1000mg Q12 to beginning at 06:00 on 6/5 for an anticipated 24H AUC/BAYLEE of 498.     1. Indication for the vancomycin: right heel debridement   2. Requesting Provider: Dr. Anne-Marie Li   3. Current plan and dosing regimen: 1500mg Q12   4. Day of therapy: 2  5. Cultures: tissue culture 5/26/24: E faecalis (susceptible to vancomycin) and MRSA  6. Target trough or AUC/BAYLEE: 400-600  7. Day and time level is due: 06/06 1700   8. Previous levels: 06/04/24 (19:31): 22  9. Expected 24-hour AUC: 768

## 2024-06-04 NOTE — CHART NOTE - NSCHARTNOTEFT_GEN_A_CORE
Assessment: patient seen for follow up. chart reviewed .   53y old  Male who presents with a chief complaint of ankle wound   patient seen in bed reports with good PO intake. no BM since Thursday per patient  status post debridement today heel ulcer      Factors impacting intake: [x ] none [ ] nausea  [ ] vomiting [ ] diarrhea [ ] constipation  [ ]chewing problems [ ] swallowing issues  [ ] other:     Diet Prescription: Diet, Consistent Carbohydrate/No Snacks (06-04-24 @ 11:06)    Intake: up to 100% of meals taken    Current Weight: Weight (kg): 106.6 (06-04 @ 09:21)      Pertinent Medications: MEDICATIONS  (STANDING):  ascorbic acid 500 milliGRAM(s) Oral daily  atorvastatin 40 milliGRAM(s) Oral at bedtime  cholecalciferol 1000 Unit(s) Oral daily  dextrose 50% Injectable 12.5 Gram(s) IV Push once  dextrose 50% Injectable 25 Gram(s) IV Push once  dextrose Oral Gel 15 Gram(s) Oral once  diltiazem    Tablet 90 milliGRAM(s) Oral every 6 hours  famotidine    Tablet 20 milliGRAM(s) Oral daily  ferrous    sulfate 325 milliGRAM(s) Oral two times a day  gabapentin 300 milliGRAM(s) Oral every 12 hours  glucagon  Injectable 1 milliGRAM(s) IntraMuscular once  heparin   Injectable 5000 Unit(s) SubCutaneous every 12 hours  insulin lispro (ADMELOG) corrective regimen sliding scale   SubCutaneous three times a day before meals  insulin lispro (ADMELOG) corrective regimen sliding scale   SubCutaneous at bedtime  lactated ringers. 1000 milliLiter(s) (75 mL/Hr) IV Continuous <Continuous>  loratadine 10 milliGRAM(s) Oral daily  melatonin 5 milliGRAM(s) Oral at bedtime  metoclopramide 5 milliGRAM(s) Oral three times a day  metoprolol tartrate 25 milliGRAM(s) Oral every 6 hours  multivitamin/minerals 1 Tablet(s) Oral daily  naloxegol 25 milliGRAM(s) Oral daily  oxybutynin 10 milliGRAM(s) Oral two times a day  oxyCODONE    IR 10 milliGRAM(s) Oral two times a day  polyethylene glycol 3350 17 Gram(s) Oral daily  potassium chloride    Tablet ER 10 milliEquivalent(s) Oral four times a day  senna 2 Tablet(s) Oral at bedtime  sucralfate 1 Gram(s) Oral two times a day  tiotropium 2.5 MICROgram(s) Inhaler 2 Puff(s) Inhalation daily  torsemide 20 milliGRAM(s) Oral two times a day  vancomycin  IVPB 1500 milliGRAM(s) IV Intermittent every 12 hours    MEDICATIONS  (PRN):  acetaminophen     Tablet .. 650 milliGRAM(s) Oral every 6 hours PRN Temp greater or equal to 38C (100.4F), Mild Pain (1 - 3)  aluminum hydroxide/magnesium hydroxide/simethicone Suspension 30 milliLiter(s) Oral every 4 hours PRN Dyspepsia  diphenhydrAMINE 25 milliGRAM(s) Oral every 6 hours PRN Rash and/or Itching  HYDROmorphone  Injectable 0.5 milliGRAM(s) IV Push every 10 minutes PRN Moderate Pain (4 - 6)  morphine  - Injectable 2 milliGRAM(s) IV Push every 6 hours PRN Severe Pain (7 - 10)  ondansetron Injectable 4 milliGRAM(s) IV Push every 8 hours PRN Nausea and/or Vomiting  ondansetron Injectable 4 milliGRAM(s) IV Push once PRN Nausea and/or Vomiting  sodium chloride 0.65% Nasal 1 Spray(s) Both Nostrils three times a day PRN Nasal Congestion    Pertinent Labs: K 3.4 KCl supplemented Na 134 POCT 229, 286 endocrine following  Skin:   right buttock stage 2  Estimated Needs:   [x ] no change since previous assessment based on # 86.1kg 20 - 25kcals /kg 1722-2152kcals and 1.2-1.4 gms protein/kg 103-120gms protein  [ ] recalculated:     Previous Nutrition Diagnosis:   [ ] Inadequate Energy Intake [ ]Inadequate Oral Intake [ ] Excessive Energy Intake   [ ] Underweight [x ] Increased Nutrient Needs [ ] Overweight/Obesity   [ ] Altered GI Function [ ] Unintended Weight Loss [ ] Food & Nutrition Related Knowledge Deficit [ ] Malnutrition     Nutrition Diagnosis is [ x] ongoing  [ ] resolved [ ] not applicable     New Nutrition Diagnosis: [x ] not applicable       Interventions:   Recommend  [ ] Change Diet To:  [ ] Nutrition Supplement  [ ] Nutrition Support  [x ] Other: recommend ensure max protein supplement BID left message with MD to activate order, increase Vit C to 500mg BID, trend K levels    Monitoring and Evaluation:   [x ] PO intake [ x ] Tolerance to diet prescription [ x ] weights [ x ] labs[ x ] follow up per protocol  [ ] other: Assessment: patient seen for follow up. chart reviewed .   53y old  Male who presents with a chief complaint of ankle wound   patient seen in bed reports with good PO intake. no BM since Thursday per patient  status post debridement today heel ulcer      Factors impacting intake: [x ] none [ ] nausea  [ ] vomiting [ ] diarrhea [ ] constipation  [ ]chewing problems [ ] swallowing issues  [ ] other:     Diet Prescription: Diet, Consistent Carbohydrate/No Snacks (06-04-24 @ 11:06)    Intake: up to 100% of meals taken    Current Weight: Weight (kg): 106.6 (06-04 @ 09:21)      Pertinent Medications: MEDICATIONS  (STANDING):  ascorbic acid 500 milliGRAM(s) Oral daily  atorvastatin 40 milliGRAM(s) Oral at bedtime  cholecalciferol 1000 Unit(s) Oral daily  dextrose 50% Injectable 12.5 Gram(s) IV Push once  dextrose 50% Injectable 25 Gram(s) IV Push once  dextrose Oral Gel 15 Gram(s) Oral once  diltiazem    Tablet 90 milliGRAM(s) Oral every 6 hours  famotidine    Tablet 20 milliGRAM(s) Oral daily  ferrous    sulfate 325 milliGRAM(s) Oral two times a day  gabapentin 300 milliGRAM(s) Oral every 12 hours  glucagon  Injectable 1 milliGRAM(s) IntraMuscular once  heparin   Injectable 5000 Unit(s) SubCutaneous every 12 hours  insulin lispro (ADMELOG) corrective regimen sliding scale   SubCutaneous three times a day before meals  insulin lispro (ADMELOG) corrective regimen sliding scale   SubCutaneous at bedtime  lactated ringers. 1000 milliLiter(s) (75 mL/Hr) IV Continuous <Continuous>  loratadine 10 milliGRAM(s) Oral daily  melatonin 5 milliGRAM(s) Oral at bedtime  metoclopramide 5 milliGRAM(s) Oral three times a day  metoprolol tartrate 25 milliGRAM(s) Oral every 6 hours  multivitamin/minerals 1 Tablet(s) Oral daily  naloxegol 25 milliGRAM(s) Oral daily  oxybutynin 10 milliGRAM(s) Oral two times a day  oxyCODONE    IR 10 milliGRAM(s) Oral two times a day  polyethylene glycol 3350 17 Gram(s) Oral daily  potassium chloride    Tablet ER 10 milliEquivalent(s) Oral four times a day  senna 2 Tablet(s) Oral at bedtime  sucralfate 1 Gram(s) Oral two times a day  tiotropium 2.5 MICROgram(s) Inhaler 2 Puff(s) Inhalation daily  torsemide 20 milliGRAM(s) Oral two times a day  vancomycin  IVPB 1500 milliGRAM(s) IV Intermittent every 12 hours    MEDICATIONS  (PRN):  acetaminophen     Tablet .. 650 milliGRAM(s) Oral every 6 hours PRN Temp greater or equal to 38C (100.4F), Mild Pain (1 - 3)  aluminum hydroxide/magnesium hydroxide/simethicone Suspension 30 milliLiter(s) Oral every 4 hours PRN Dyspepsia  diphenhydrAMINE 25 milliGRAM(s) Oral every 6 hours PRN Rash and/or Itching  HYDROmorphone  Injectable 0.5 milliGRAM(s) IV Push every 10 minutes PRN Moderate Pain (4 - 6)  morphine  - Injectable 2 milliGRAM(s) IV Push every 6 hours PRN Severe Pain (7 - 10)  ondansetron Injectable 4 milliGRAM(s) IV Push every 8 hours PRN Nausea and/or Vomiting  ondansetron Injectable 4 milliGRAM(s) IV Push once PRN Nausea and/or Vomiting  sodium chloride 0.65% Nasal 1 Spray(s) Both Nostrils three times a day PRN Nasal Congestion    Pertinent Labs: K 3.4 KCl supplemented Na 134 POCT 229, 286 endocrine following  Skin:   right buttock stage 2  Estimated Needs:   [x ] no change since previous assessment based on # 86.1kg 20 - 25kcals /kg 1722-2152kcals and 1.2-1.4 gms protein/kg 103-120gms protein  [ ] recalculated:     Previous Nutrition Diagnosis:   [ ] Inadequate Energy Intake [ ]Inadequate Oral Intake [ ] Excessive Energy Intake   [ ] Underweight [x ] Increased Nutrient Needs [ ] Overweight/Obesity   [ ] Altered GI Function [ ] Unintended Weight Loss [ ] Food & Nutrition Related Knowledge Deficit [ ] Malnutrition     Nutrition Diagnosis is [ x] ongoing  [ ] resolved [ ] not applicable     New Nutrition Diagnosis: [x ] not applicable       Interventions:   Recommend  [ ] Change Diet To:  [ ] Nutrition Supplement  [ ] Nutrition Support  [x ] Other: recommend ensure max  supplement BID left message with MD to activate order, increase Vit C to 500mg BID, trend K levels    Monitoring and Evaluation:   [x ] PO intake [ x ] Tolerance to diet prescription [ x ] weights [ x ] labs[ x ] follow up per protocol  [ ] other: Assessment: patient seen for follow up. chart reviewed .   53y old  Male who presents with a chief complaint of ankle wound   patient seen in bed reports with good PO intake. no BM since Thursday per patient  status post debridement today heel ulcer      Factors impacting intake: [x ] none [ ] nausea  [ ] vomiting [ ] diarrhea [ ] constipation  [ ]chewing problems [ ] swallowing issues  [ ] other:     Diet Prescription: Diet, Consistent Carbohydrate/No Snacks (06-04-24 @ 11:06)    Intake: up to 100% of meals taken    Current Weight: Weight (kg): 106.6 (06-04 @ 09:21)      Pertinent Medications: MEDICATIONS  (STANDING):  ascorbic acid 500 milliGRAM(s) Oral daily  atorvastatin 40 milliGRAM(s) Oral at bedtime  cholecalciferol 1000 Unit(s) Oral daily  dextrose 50% Injectable 12.5 Gram(s) IV Push once  dextrose 50% Injectable 25 Gram(s) IV Push once  dextrose Oral Gel 15 Gram(s) Oral once  diltiazem    Tablet 90 milliGRAM(s) Oral every 6 hours  famotidine    Tablet 20 milliGRAM(s) Oral daily  ferrous    sulfate 325 milliGRAM(s) Oral two times a day  gabapentin 300 milliGRAM(s) Oral every 12 hours  glucagon  Injectable 1 milliGRAM(s) IntraMuscular once  heparin   Injectable 5000 Unit(s) SubCutaneous every 12 hours  insulin lispro (ADMELOG) corrective regimen sliding scale   SubCutaneous three times a day before meals  insulin lispro (ADMELOG) corrective regimen sliding scale   SubCutaneous at bedtime  lactated ringers. 1000 milliLiter(s) (75 mL/Hr) IV Continuous <Continuous>  loratadine 10 milliGRAM(s) Oral daily  melatonin 5 milliGRAM(s) Oral at bedtime  metoclopramide 5 milliGRAM(s) Oral three times a day  metoprolol tartrate 25 milliGRAM(s) Oral every 6 hours  multivitamin/minerals 1 Tablet(s) Oral daily  naloxegol 25 milliGRAM(s) Oral daily  oxybutynin 10 milliGRAM(s) Oral two times a day  oxyCODONE    IR 10 milliGRAM(s) Oral two times a day  polyethylene glycol 3350 17 Gram(s) Oral daily  potassium chloride    Tablet ER 10 milliEquivalent(s) Oral four times a day  senna 2 Tablet(s) Oral at bedtime  sucralfate 1 Gram(s) Oral two times a day  tiotropium 2.5 MICROgram(s) Inhaler 2 Puff(s) Inhalation daily  torsemide 20 milliGRAM(s) Oral two times a day  vancomycin  IVPB 1500 milliGRAM(s) IV Intermittent every 12 hours    MEDICATIONS  (PRN):  acetaminophen     Tablet .. 650 milliGRAM(s) Oral every 6 hours PRN Temp greater or equal to 38C (100.4F), Mild Pain (1 - 3)  aluminum hydroxide/magnesium hydroxide/simethicone Suspension 30 milliLiter(s) Oral every 4 hours PRN Dyspepsia  diphenhydrAMINE 25 milliGRAM(s) Oral every 6 hours PRN Rash and/or Itching  HYDROmorphone  Injectable 0.5 milliGRAM(s) IV Push every 10 minutes PRN Moderate Pain (4 - 6)  morphine  - Injectable 2 milliGRAM(s) IV Push every 6 hours PRN Severe Pain (7 - 10)  ondansetron Injectable 4 milliGRAM(s) IV Push every 8 hours PRN Nausea and/or Vomiting  ondansetron Injectable 4 milliGRAM(s) IV Push once PRN Nausea and/or Vomiting  sodium chloride 0.65% Nasal 1 Spray(s) Both Nostrils three times a day PRN Nasal Congestion    Pertinent Labs: K 3.4 KCl supplemented Na 134 POCT 229, 286 endocrine following  Skin:   right buttock stage 2  Estimated Needs:   [x ] no change since previous assessment based on # 86.1kg 20 - 25kcals /kg 1722-2152kcals and 1.2-1.4 gms protein/kg 103-120gms protein  [ ] recalculated:     Previous Nutrition Diagnosis:   [ ] Inadequate Energy Intake [ ]Inadequate Oral Intake [ ] Excessive Energy Intake   [ ] Underweight [x ] Increased Nutrient Needs [ ] Overweight/Obesity   [ ] Altered GI Function [ ] Unintended Weight Loss [ ] Food & Nutrition Related Knowledge Deficit [ ] Malnutrition     Nutrition Diagnosis is [ x] ongoing  [ ] resolved [ ] not applicable     New Nutrition Diagnosis: [x ] not applicable       Interventions:   Recommend  [ ] Change Diet To:  [ ] Nutrition Supplement  [ ] Nutrition Support  [x ] Other: recommend ensure max  supplement BID left message with MD to activate order, increase Vit C to 500mg BID, trend K levels, recommend bowel regimen     Monitoring and Evaluation:   [x ] PO intake [ x ] Tolerance to diet prescription [ x ] weights [ x ] labs[ x ] follow up per protocol  [ ] other:

## 2024-06-04 NOTE — BRIEF OPERATIVE NOTE - OPERATION/FINDINGS
Patient called and stating she is getting recurrent yeast infections under a skin fold she has on her abdomen. Patient stated Dr. Pabon gave her some medication  nystatin (MYCOSTATIN) 067132 UNIT/GM cream. Patient stated that when she seen MARSHAL Ortiz for the same issue back in May she was also prescribed medication triamcinolone acetonide (KENALOG-40) 40 MG/ML injection 40 mg. Patient would like to know if she can be prescribed triamcinolone acetonide (KENALOG-40) 40 MG/ML injection 40 mg again in addition to the other cream because the rash seems to stay away longer with both creams.  
Patient has frequent skin yeast infections, has utilized two creams when skin infected, Nystatin and triamcinolone cream. Patient applies to affected area after cleansed, then utilizes baby powder to help decrease moisture. Her abdominal fold covers her suprapubic region which does not allow sufficient airing but she does try to do so every chance she gets.     Refill requests for:    Nystatin [id number] unit/gm cream 30 g Apply to affected area(s) three times daily.  Triamcinolone 0.5 % cream 30 g Apply topically 2 times daily (In 1:1 ratio with nystatin cream)     Patient will call with an update by Friday if noting no improvement. Advised patient to continue to monitor. Call with any persistent or worsening symptoms. Patient agreeable to plan, verbalized understanding, and no further questions at this time.     
Right heel wound down to skin, subcutaneous tissue, fat, bone

## 2024-06-04 NOTE — PROGRESS NOTE ADULT - SUBJECTIVE AND OBJECTIVE BOX
[facility] -- Brittany Lutz, Reynaldo Sweeney Savella, , Gera Maya  Office # [phone]    Follow Up:  Hx AFib, CAD, Cardiac Optimization    Subjective/Observations: Awake and alert, remains comfortable on RA.  Denies any form of respiratory or cardiac discomfort.  Denies foot/heel pain    REVIEW OF SYSTEMS: All other review of systems is negative unless indicated above  PAST MEDICAL & SURGICAL HISTORY:  Diabetes      Diabetes mellitus with no complication      Afib      Hypertension      BPH (benign prostatic hyperplasia)      Perforated gastric ulcer  s/p emergent ex-lap omentopexy and plication 6/2019      Pulmonary embolism      History of non-ST elevation myocardial infarction (NSTEMI)      Osteomyelitis  s/p debridement      CAD S/P percutaneous coronary angioplasty      Cerebrovascular accident      H/O abdominal surgery      Perforated gastric ulcer      Traumatic amputation of left foot, initial encounter        MEDICATIONS  (STANDING):  ascorbic acid 500 milliGRAM(s) Oral daily  atorvastatin 40 milliGRAM(s) Oral at bedtime  cholecalciferol 1000 Unit(s) Oral daily  dextrose 50% Injectable 25 Gram(s) IV Push once  dextrose 50% Injectable 12.5 Gram(s) IV Push once  dextrose 50% Injectable 25 Gram(s) IV Push once  dextrose Oral Gel 15 Gram(s) Oral once  diltiazem    Tablet 90 milliGRAM(s) Oral every 6 hours  famotidine    Tablet 20 milliGRAM(s) Oral daily  ferrous    sulfate 325 milliGRAM(s) Oral two times a day  gabapentin 300 milliGRAM(s) Oral every 12 hours  glucagon  Injectable 1 milliGRAM(s) IntraMuscular once  heparin   Injectable 5000 Unit(s) SubCutaneous every 12 hours  insulin lispro (ADMELOG) corrective regimen sliding scale   SubCutaneous three times a day before meals  insulin lispro (ADMELOG) corrective regimen sliding scale   SubCutaneous at bedtime  insulin lispro Injectable (ADMELOG). 2 Unit(s) SubCutaneous once  loratadine 10 milliGRAM(s) Oral daily  melatonin 5 milliGRAM(s) Oral at bedtime  metoclopramide 5 milliGRAM(s) Oral three times a day  metoprolol tartrate 25 milliGRAM(s) Oral every 6 hours  multivitamin/minerals 1 Tablet(s) Oral daily  naloxegol 25 milliGRAM(s) Oral daily  oxybutynin 10 milliGRAM(s) Oral two times a day  oxyCODONE    IR 10 milliGRAM(s) Oral two times a day  polyethylene glycol 3350 17 Gram(s) Oral daily  potassium chloride    Tablet ER 10 milliEquivalent(s) Oral four times a day  senna 2 Tablet(s) Oral at bedtime  sucralfate 1 Gram(s) Oral two times a day  tiotropium 2.5 MICROgram(s) Inhaler 2 Puff(s) Inhalation daily  torsemide 20 milliGRAM(s) Oral two times a day  triamcinolone 0.1% Cream 1 Application(s) Topical every 6 hours  vancomycin  IVPB      vancomycin  IVPB 1500 milliGRAM(s) IV Intermittent every 12 hours    MEDICATIONS  (PRN):  acetaminophen     Tablet .. 650 milliGRAM(s) Oral every 6 hours PRN Temp greater or equal to 38C (100.4F), Mild Pain (1 - 3)  aluminum hydroxide/magnesium hydroxide/simethicone Suspension 30 milliLiter(s) Oral every 4 hours PRN Dyspepsia  diphenhydrAMINE 25 milliGRAM(s) Oral every 6 hours PRN Rash and/or Itching  morphine  - Injectable 2 milliGRAM(s) IV Push every 6 hours PRN Severe Pain (7 - 10)  ondansetron Injectable 4 milliGRAM(s) IV Push every 8 hours PRN Nausea and/or Vomiting  sodium chloride 0.65% Nasal 1 Spray(s) Both Nostrils three times a day PRN Nasal Congestion    Allergies    No Known Drug Allergies  fish (Hives)    Intolerances      Vital Signs Last 24 Hrs  T(C): 36.7 (04 Jun 2024 08:04), Max: 37.1 (04 Jun 2024 07:12)  T(F): 98.1 (04 Jun 2024 08:04), Max: 98.8 (04 Jun 2024 07:12)  HR: 84 (04 Jun 2024 08:04) (79 - 102)  BP: 105/62 (04 Jun 2024 08:04) (102/76 - 122/75)  BP(mean): --  RR: 16 (04 Jun 2024 08:04) (14 - 19)  SpO2: 97% (04 Jun 2024 08:04) (92% - 97%)    Parameters below as of 04 Jun 2024 08:04  Patient On (Oxygen Delivery Method): room air      I&O's Summary    03 Jun 2024 07:01  -  04 Jun 2024 07:00  --------------------------------------------------------  IN: 0 mL / OUT: 4850 mL / NET: -4850 mL      Weight (kg): 106.6 (06-04 @ 09:21)  PHYSICAL EXAM:  TELE: Not on tele  Constitutional: NAD, awake and alert  HEENT: Moist Mucous Membranes, Anicteric  Pulmonary: Non-labored, breath sounds are clear bilaterally, No wheezing, rales or rhonchi  Cardiovascular: IRRR, S1 and S2, +murmurs, no rubs, gallops or clicks  Gastrointestinal: Bowel Sounds present, soft, nontender.   Lymph: No peripheral edema. No lymphadenopathy.  Skin: No visible rashes.  Left heel ulcer with dressing dry and intact.  Open blister BLE  Psych:  Mood & affect appropriate  LABS: All Labs Reviewed:                        12.2   10.78 )-----------( 290      ( 03 Jun 2024 08:00 )             38.4                         12.8   12.41 )-----------( 333      ( 02 Jun 2024 08:55 )             40.3     03 Jun 2024 08:00    139    |  102    |  17     ----------------------------<  232    3.8     |  32     |  1.10   02 Jun 2024 08:55    139    |  105    |  14     ----------------------------<  242    4.0     |  32     |  1.10     Ca    9.0        03 Jun 2024 08:00  Ca    9.2        02 Jun 2024 08:55    12 Lead ECG:   Ventricular Rate 127 BPM    QRS Duration 82 ms    Q-T Interval 276 ms    QTC Calculation(Bazett) 401 ms    R Axis 13 degrees    T Axis 5 degrees    Diagnosis Line Atrial fibrillation with rapid ventricular response with premature ventricular or aberrantly conducted complexes  Low voltage QRS  Cannot rule out Anterior infarct , age undetermined  Abnormal ECG    Confirmed by darien France ([id number]) on 5/28/2024 2:53:37 PM (05-28-24 @ 11:53)  TRANSTHORACIC ECHOCARDIOGRAM REPORT  ________________________________________________________________________________                                      _______   Pt. Name:       SARAH MORRISON Study Date:    3/18/2024  MRN:            [id number]         YOB: 1970  Accession #:    [id number]        Age:           53 years  Account#:       [id number]       Gender:        M  Heart Rate:                      Height:        74.02 in (188.00 cm)  Rhythm:                          Weight:        244.71 lb (111.00 kg)  Blood Pressure: 100/60 mmHg      BSA/BMI:       2.37 m² / 31.41 kg/m²  ________________________________________________________________________________________  Referring Physician:    [id number] Hill Brizuela  Interpreting Physician: Mary Maya MD  Primary Sonographer:    Mounika FELDMAN    CPT:               ECHO TTE WO CON COMP W DOPP - 11290.m  Indication(s):     Dyspnea, unspecified - R06.00  Procedure:         Transthoracic echocardiogram with 2-D, M-mode and complete                     spectral and color flow Doppler.  Ordering Location: [facility]  Admission Status:  Inpatient  Study Information: Image quality for this study is technically difficult.    _______________________________________________________________________________________     CONCLUSIONS:      1. Technically difficult image quality.   2. Left ventricular cavity is normal in size. Left ventricular wall thickness is normal. Left ventricular systolic function is normal with an ejection fraction visually estimated at 50 to 55 %.   3. Normal right ventricular cavity size, with normal wall thickness, and mildly reduced systolic function.   4. The left atrium is moderately dilated.   5. The right atrium is moderately dilated.   6. Moderate to severe mitral regurgitation.   7. Mild to moderate tricuspid regurgitation.   8. Estimated pulmonary artery systolic pressure is 36 mmHg, consistent with mild pulmonary hypertension.   9. The inferior vena cava is dilated measuring 2.40 cm in diameter, (dilated >2.1cm) with normal inspiratory collapse (normal >50%) consistent with mildly elevated right atrial pressure (~8, range 5-10mmHg).  10. Trace pericardial effusion.    ________________________________________________________________________________________  FINDINGS:     Left Ventricle:  The left ventricular cavity is normal in size. Left ventricular wall thickness is normal. Left ventricular systolic function is normal with an ejection fraction visually estimated at 50 to 55%.The left ventricular diastolic function is indeterminate.     Right Ventricle:  The right ventricular cavity is normal in size, with normal wall thickness and mildly reduced systolic function.     Left Atrium:  The left atrium is moderately dilated.     Right Atrium:  The right atrium is moderately dilated.     Aortic Valve:  The aortic valve anatomy cannot be determined with normal systolic excursion. There is no aortic valve stenosis.     Mitral Valve:  There is mild calcification of the mitralvalve annulus. There is moderate to severe mitral regurgitation.     Tricuspid Valve:  Structurally normal tricuspid valve with normal leaflet excursion. There is mild to moderate tricuspid regurgitation. Estimated pulmonary artery systolic pressure is 36 mmHg, consistent with mild pulmonary hypertension.     Pulmonic Valve:  The pulmonic valve was not well visualized. There is trace pulmonic regurgitation.     Pericardium:  There is a trace pericardial effusion.     Systemic Veins:  The inferiorvena cava is dilated measuring 2.40 cm in diameter, (dilated >2.1cm) with normal inspiratory collapse (normal >50%) consistent with mildly elevated right atrial pressure (~8, range 5-10mmHg).  ____________________________________________________________________  QUANTITATIVE DATA:  Left Ventricle Measurements: (Indexed to BSA)     IVSd (2D):   1.9 cm  LVPWd (2D):  1.5 cm  LVIDd (2D):  5.3 cm  LVIDs (2D):  3.9 cm  LV Mass:     413 g  174.4 g/m²  Visualized LV EF%: 50 to 55%     MV E Vmax:    1.15 m/s  e' lateral:   10.90 cm/s  e' medial:    10.50 cm/s  E/e' lateral: 10.56  E/e' medial:  12.00  E/e' Average: 10.76  MV DT:        137 msec    Aorta Measurements: (Normal range) (Indexed to BSA)     Sinuses of Valsalva: 3.50 cm (3.1 - 3.7 cm)       Left Atrium Measurements: (Indexed to BSA)  LA Diam 2D: 3.99 cm       LVOT / RVOT/ Qp/Qs Data: (Indexed to BSA)  LVOT Diameter: 2.22 cm    Mitral Valve Measurements:     MV E Vmax: 1.2 m/s       Tricuspid Valve Measurements:     TR Vmax:          2.6m/s  TR Peak Gradient: 27.7 mmHg  RA Pressure:      8 mmHg  PASP:             36 mmHg    ________________________________________________________________________________________  Electronically signed on 3/19/2024 at 11:27:59 AM by Mary Maya MD         *** Final ***

## 2024-06-04 NOTE — PROGRESS NOTE ADULT - SUBJECTIVE AND OBJECTIVE BOX
CAPILLARY BLOOD GLUCOSE      POCT Blood Glucose.: 259 mg/dL (04 Jun 2024 07:55)  POCT Blood Glucose.: 164 mg/dL (03 Jun 2024 21:27)  POCT Blood Glucose.: 374 mg/dL (03 Jun 2024 17:00)  POCT Blood Glucose.: 412 mg/dL (03 Jun 2024 16:58)  POCT Blood Glucose.: 349 mg/dL (03 Jun 2024 12:21)      Vital Signs Last 24 Hrs  T(C): 36.7 (04 Jun 2024 08:04), Max: 36.9 (03 Jun 2024 20:44)  T(F): 98.1 (04 Jun 2024 08:04), Max: 98.4 (03 Jun 2024 20:44)  HR: 84 (04 Jun 2024 08:04) (79 - 84)  BP: 105/62 (04 Jun 2024 08:04) (105/62 - 122/75)  BP(mean): --  RR: 16 (04 Jun 2024 08:04) (16 - 19)  SpO2: 97% (04 Jun 2024 08:04) (93% - 97%)    Parameters below as of 04 Jun 2024 08:04  Patient On (Oxygen Delivery Method): room air        General: WN/WD NAD  Respiratory: CTA B/L  CV: RRR, S1S2, no murmurs, rubs or gallops  Abdominal: Soft, NT, ND +BS, Last BM  Extremities:le foot dsg intact     06-03    139  |  102  |  17  ----------------------------<  232<H>  3.8   |  32<H>  |  1.10    Ca    9.0      03 Jun 2024 08:00        atorvastatin 40 milliGRAM(s) Oral at bedtime  dextrose 50% Injectable 25 Gram(s) IV Push once  dextrose 50% Injectable 12.5 Gram(s) IV Push once  dextrose 50% Injectable 25 Gram(s) IV Push once  dextrose Oral Gel 15 Gram(s) Oral once  glucagon  Injectable 1 milliGRAM(s) IntraMuscular once  insulin lispro (ADMELOG) corrective regimen sliding scale   SubCutaneous three times a day before meals  insulin lispro (ADMELOG) corrective regimen sliding scale   SubCutaneous at bedtime

## 2024-06-04 NOTE — SOCIAL WORK PROGRESS NOTE - NSSWPROGRESSNOTE_GEN_ALL_CORE
Per am rounds, pt scheduled for debridement today, pt not cleared for dc. Pt LTC from [facility], per admission auth is needed prior to pt's return. [facility] and clinical documents sent to [employer].  to remain available and continue to follow up.

## 2024-06-04 NOTE — PROGRESS NOTE ADULT - ASSESSMENT
53M w/ PMHx of Afib, CAD s/p stents, CVA, DM, HTN, pulmonary embolism on Eliquis,  osteomyelitis s/p debridement presenting  presents to emergency department from [facility] for R heel ulcer debridement.    Afib, CAD, HTN  - Known  Afib, initially RVR.  s/p Dig load.  Rates have been controlled  - Continue Cardizem 90 mg PO Q6H.  Will switch to long-acting in am, 6/5  - Hold home Eliquis in anticipation of Podia procedure today, 6/4.  Pls, resume post op when able  - Continue Metoprolol 25mg changed to every 6 hours.  Switch to long-acting in am, 6/5  - Hold on further doses of Digoxin.  - Had pauses last admission, so would use caution.  No pauses noted while on tele.     - 3/18/24: Technically difficult ECHO w/ normal LV size and function EF 50-55%, mildly reduced RV systolic function, mod dilated LA and RA, mod to sev MR, mild to mod TR, PASP 36  - Euvolemic on exam with no O2 requirement, c/w torsemide    - EKG showed A fib RVR, PVC @ 127.   - No evidence of any active ischemia   - Can hold aspirin for procedure if deemed necessary.  Resume postop  - Continue statin     - For  surgical debridement of the right heel with bone biopsy, possible partial calcanectomy today, 6/4  - Pt has no active ischemia, decompensated heart failure, unstable arrythmia.  He has a known mod-severe MR but no clear volume overload except for mild edema.  Patient remains optimized at best from cardiovascular standpoint to proceed with planned procedure with routine hemodynamic monitoring     - BP stable and controlled   - Monitor and replete lytes, keep K>4, Mg>2.  - Will continue to follow.    Henrietta Kim DNP, NP-C, AGACNP-C  Cardiology   Call TEAMS

## 2024-06-04 NOTE — PROGRESS NOTE ADULT - ASSESSMENT
Pt is a 53M w/ PMHx of Afib, CAD, DM presenting for R heel ulcer debridement.   S/p zosyn x7 day course at facility  Being admitted for debridement  Prior WCx MRSA/E. faecalis from 3/26/24  6/3 Now w/ uptrending WBC and drainage from wound   6/4 S/p Selective debridement of wound 04-Jun-2024 10:52:57  Parviz Todd    Recommendations:   F/u WCx  F/u OR TCx  C/w vancomycin, goal trough 15-20, dosing per pharmacy protocol  Appreciate podiatry/wound care  Pain control, supportive measures and additional management per primary team  Further recs to follow pending above    Infectious Diseases will follow. Please call with any questions.  Anne-Marie Li M.D.  [facility] Division of Infectious Diseases [phone]  For after 5 P.M. and weekends, please call [phone]

## 2024-06-04 NOTE — PROGRESS NOTE ADULT - SUBJECTIVE AND OBJECTIVE BOX
Date of Service 06-04-24 @ 15:19    Patient is a 53y old  Male who presents with a chief complaint of heel wound (04 Jun 2024 13:26)      INTERVAL /OVERNIGHT EVENTS: seen after surgery today, denies pain    MEDICATIONS  (STANDING):  ascorbic acid 500 milliGRAM(s) Oral daily  atorvastatin 40 milliGRAM(s) Oral at bedtime  cholecalciferol 1000 Unit(s) Oral daily  dextrose 50% Injectable 12.5 Gram(s) IV Push once  dextrose 50% Injectable 25 Gram(s) IV Push once  dextrose Oral Gel 15 Gram(s) Oral once  diltiazem    Tablet 90 milliGRAM(s) Oral every 6 hours  famotidine    Tablet 20 milliGRAM(s) Oral daily  ferrous    sulfate 325 milliGRAM(s) Oral two times a day  gabapentin 300 milliGRAM(s) Oral every 12 hours  glucagon  Injectable 1 milliGRAM(s) IntraMuscular once  heparin   Injectable 5000 Unit(s) SubCutaneous every 12 hours  insulin lispro (ADMELOG) corrective regimen sliding scale   SubCutaneous three times a day before meals  insulin lispro (ADMELOG) corrective regimen sliding scale   SubCutaneous at bedtime  lactated ringers. 1000 milliLiter(s) (75 mL/Hr) IV Continuous <Continuous>  loratadine 10 milliGRAM(s) Oral daily  melatonin 5 milliGRAM(s) Oral at bedtime  metoclopramide 5 milliGRAM(s) Oral three times a day  metoprolol tartrate 25 milliGRAM(s) Oral every 6 hours  multivitamin/minerals 1 Tablet(s) Oral daily  naloxegol 25 milliGRAM(s) Oral daily  oxybutynin 10 milliGRAM(s) Oral two times a day  oxyCODONE    IR 10 milliGRAM(s) Oral two times a day  polyethylene glycol 3350 17 Gram(s) Oral daily  potassium chloride    Tablet ER 10 milliEquivalent(s) Oral four times a day  senna 2 Tablet(s) Oral at bedtime  sucralfate 1 Gram(s) Oral two times a day  tiotropium 2.5 MICROgram(s) Inhaler 2 Puff(s) Inhalation daily  torsemide 20 milliGRAM(s) Oral two times a day  vancomycin  IVPB 1500 milliGRAM(s) IV Intermittent every 12 hours    MEDICATIONS  (PRN):  acetaminophen     Tablet .. 650 milliGRAM(s) Oral every 6 hours PRN Temp greater or equal to 38C (100.4F), Mild Pain (1 - 3)  aluminum hydroxide/magnesium hydroxide/simethicone Suspension 30 milliLiter(s) Oral every 4 hours PRN Dyspepsia  diphenhydrAMINE 25 milliGRAM(s) Oral every 6 hours PRN Rash and/or Itching  HYDROmorphone  Injectable 0.5 milliGRAM(s) IV Push every 10 minutes PRN Moderate Pain (4 - 6)  morphine  - Injectable 2 milliGRAM(s) IV Push every 6 hours PRN Severe Pain (7 - 10)  ondansetron Injectable 4 milliGRAM(s) IV Push every 8 hours PRN Nausea and/or Vomiting  ondansetron Injectable 4 milliGRAM(s) IV Push once PRN Nausea and/or Vomiting  sodium chloride 0.65% Nasal 1 Spray(s) Both Nostrils three times a day PRN Nasal Congestion      Allergies    No Known Drug Allergies  fish (Hives)    Intolerances        REVIEW OF SYSTEMS:  CONSTITUTIONAL: No fever, weight loss, or fatigue  EYES: No eye pain, visual disturbances, or discharge  ENMT:  No difficulty hearing, tinnitus, vertigo; No sinus or throat pain  NECK: No pain or stiffness  RESPIRATORY: No cough, wheezing, chills or hemoptysis; No shortness of breath  CARDIOVASCULAR: No chest pain, palpitations, dizziness, or leg swelling  GASTROINTESTINAL: No abdominal or epigastric pain. No nausea, vomiting, or hematemesis; No diarrhea or constipation. No melena or hematochezia.  GENITOURINARY: No dysuria, frequency, hematuria, or incontinence  NEUROLOGICAL: No headaches, memory loss, loss of strength, numbness, or tremors  SKIN: right heel ulcer  LYMPH NODES: No enlarged glands  ENDOCRINE: No heat or cold intolerance; No hair loss; No polydipsia or polyuria  MUSCULOSKELETAL: No joint pain or swelling; No muscle, back, or extremity pain  PSYCHIATRIC: No depression, anxiety, mood swings, or difficulty sleeping  HEME/LYMPH: No easy bruising, or bleeding gums  ALLERGY AND IMMUNOLOGIC: No hives or eczema    Vital Signs Last 24 Hrs  T(C): 36.6 (04 Jun 2024 12:21), Max: 37.1 (04 Jun 2024 07:12)  T(F): 97.8 (04 Jun 2024 12:21), Max: 98.8 (04 Jun 2024 07:12)  HR: 60 (04 Jun 2024 12:21) (60 - 102)  BP: 111/67 (04 Jun 2024 12:21) (98/56 - 122/75)  BP(mean): --  RR: 17 (04 Jun 2024 12:21) (12 - 19)  SpO2: 97% (04 Jun 2024 12:21) (57% - 97%)    Parameters below as of 04 Jun 2024 12:21  Patient On (Oxygen Delivery Method): room air        PHYSICAL EXAM:  GENERAL: NAD, well-groomed, well-developed  HEAD:  Atraumatic, Normocephalic  EYES: EOMI, PERRLA, conjunctiva and sclera clear  ENMT: No tonsillar erythema, exudates, or enlargement; Moist mucous membranes, Good dentition, No lesions  NECK: Supple, No JVD, Normal thyroid  NERVOUS SYSTEM:  Alert & Oriented X3, Good concentration; Motor Strength 5/5 B/L upper and lower extremities; DTRs 2+ intact and symmetric  CHEST/LUNG: Clear to auscultation bilaterally; No rales, rhonchi, wheezing, or rubs  HEART: Regular rate and rhythm; No murmurs, rubs, or gallops  ABDOMEN: Soft, Nontender, Nondistended; Bowel sounds present  EXTREMITIES:  right ankle in surgical bandage  LYMPH: No lymphadenopathy noted  SKIN: right heel ulcer    LABS:                        12.6   11.30 )-----------( 296      ( 04 Jun 2024 09:05 )             37.9     04 Jun 2024 09:05    134    |  98     |  17     ----------------------------<  334    3.4     |  29     |  0.95     Ca    8.8        04 Jun 2024 09:05        Urinalysis Basic - ( 04 Jun 2024 09:05 )    Color: x / Appearance: x / SG: x / pH: x  Gluc: 334 mg/dL / Ketone: x  / Bili: x / Urobili: x   Blood: x / Protein: x / Nitrite: x   Leuk Esterase: x / RBC: x / WBC x   Sq Epi: x / Non Sq Epi: x / Bacteria: x      CAPILLARY BLOOD GLUCOSE      POCT Blood Glucose.: 229 mg/dL (04 Jun 2024 11:43)  POCT Blood Glucose.: 286 mg/dL (04 Jun 2024 10:43)  POCT Blood Glucose.: 294 mg/dL (04 Jun 2024 09:15)  POCT Blood Glucose.: 259 mg/dL (04 Jun 2024 07:55)  POCT Blood Glucose.: 164 mg/dL (03 Jun 2024 21:27)  POCT Blood Glucose.: 374 mg/dL (03 Jun 2024 17:00)  POCT Blood Glucose.: 412 mg/dL (03 Jun 2024 16:58)      RADIOLOGY & ADDITIONAL TESTS:    Notes Reviewed:  [x ] YES  [ ] NO    Care Discussed with Consultants/Other Providers x] YES  [ ] NO

## 2024-06-04 NOTE — PROGRESS NOTE ADULT - SUBJECTIVE AND OBJECTIVE BOX
[facility], Division of Infectious Diseases  DELGADO Rodriguez Y. Patel, S. Shah, G. [facility]  [phone]    Name: SARAH MORRISON  Age: 53y  Gender: Male  MRN: [id number]    Interval History:  Patient seen and examined at bedside  No acute overnight events. Afebrile  S/p Selective debridement of wound 04-Jun-2024 10:52:57  Parviz Todd  Notes reviewed    Antibiotics:  vancomycin  IVPB 1500 milliGRAM(s) IV Intermittent every 12 hours      Medications:  acetaminophen     Tablet .. 650 milliGRAM(s) Oral every 6 hours PRN  aluminum hydroxide/magnesium hydroxide/simethicone Suspension 30 milliLiter(s) Oral every 4 hours PRN  ascorbic acid 500 milliGRAM(s) Oral daily  atorvastatin 40 milliGRAM(s) Oral at bedtime  cholecalciferol 1000 Unit(s) Oral daily  dextrose 50% Injectable 12.5 Gram(s) IV Push once  dextrose 50% Injectable 25 Gram(s) IV Push once  dextrose Oral Gel 15 Gram(s) Oral once  diltiazem    Tablet 90 milliGRAM(s) Oral every 6 hours  diphenhydrAMINE 25 milliGRAM(s) Oral every 6 hours PRN  famotidine    Tablet 20 milliGRAM(s) Oral daily  ferrous    sulfate 325 milliGRAM(s) Oral two times a day  gabapentin 300 milliGRAM(s) Oral every 12 hours  glucagon  Injectable 1 milliGRAM(s) IntraMuscular once  heparin   Injectable 5000 Unit(s) SubCutaneous every 12 hours  HYDROmorphone  Injectable 0.5 milliGRAM(s) IV Push every 10 minutes PRN  insulin lispro (ADMELOG) corrective regimen sliding scale   SubCutaneous three times a day before meals  insulin lispro (ADMELOG) corrective regimen sliding scale   SubCutaneous at bedtime  lactated ringers. 1000 milliLiter(s) IV Continuous <Continuous>  loratadine 10 milliGRAM(s) Oral daily  melatonin 5 milliGRAM(s) Oral at bedtime  metoclopramide 5 milliGRAM(s) Oral three times a day  metoprolol tartrate 25 milliGRAM(s) Oral every 6 hours  morphine  - Injectable 2 milliGRAM(s) IV Push every 6 hours PRN  multivitamin/minerals 1 Tablet(s) Oral daily  naloxegol 25 milliGRAM(s) Oral daily  ondansetron Injectable 4 milliGRAM(s) IV Push every 8 hours PRN  ondansetron Injectable 4 milliGRAM(s) IV Push once PRN  oxybutynin 10 milliGRAM(s) Oral two times a day  oxyCODONE    IR 10 milliGRAM(s) Oral two times a day  polyethylene glycol 3350 17 Gram(s) Oral daily  potassium chloride    Tablet ER 10 milliEquivalent(s) Oral four times a day  senna 2 Tablet(s) Oral at bedtime  sodium chloride 0.65% Nasal 1 Spray(s) Both Nostrils three times a day PRN  sucralfate 1 Gram(s) Oral two times a day  tiotropium 2.5 MICROgram(s) Inhaler 2 Puff(s) Inhalation daily  torsemide 20 milliGRAM(s) Oral two times a day  vancomycin  IVPB 1500 milliGRAM(s) IV Intermittent every 12 hours      Review of Systems: 10 pt ROS performed, negative unless noted otherwise above    Allergies: No Known Drug Allergies  fish (Hives)    For details regarding the patient's past medical history, social history, family history, and other miscellaneous elements, please refer the initial infectious diseases consultation and/or the admitting history and physical examination for this admission.    Objective:  Vitals:   T(C): 36.6 (06-04-24 @ 12:21), Max: 37.1 (06-04-24 @ 07:12)  HR: 60 (06-04-24 @ 12:21) (60 - 102)  BP: 111/67 (06-04-24 @ 12:21) (98/56 - 122/75)  RR: 17 (06-04-24 @ 12:21) (12 - 19)  SpO2: 97% (06-04-24 @ 12:21) (57% - 97%)    Physical Examination:  General: no acute distress  HEENT: NC/AT, EOMI,  Cardio: RRR  Resp: symmetric chest rise  Abd: soft, NT, ND,  Ext: no edema or cyanosis, foot in dressing  Skin: warm, dry, no visible rash      Laboratory Studies:  CBC:                       12.6   11.30 )-----------( 296      ( 04 Jun 2024 09:05 )             37.9     CMP: 06-04    134<L>  |  98  |  17  ----------------------------<  334<H>  3.4<L>   |  29  |  0.95    Ca    8.8      04 Jun 2024 09:05        Urinalysis Basic - ( 04 Jun 2024 09:05 )    Color: x / Appearance: x / SG: x / pH: x  Gluc: 334 mg/dL / Ketone: x  / Bili: x / Urobili: x   Blood: x / Protein: x / Nitrite: x   Leuk Esterase: x / RBC: x / WBC x   Sq Epi: x / Non Sq Epi: x / Bacteria: x        Microbiology: reviewed        Radiology: reviewed

## 2024-06-05 DIAGNOSIS — R21 RASH AND OTHER NONSPECIFIC SKIN ERUPTION: ICD-10-CM

## 2024-06-05 LAB
-  CLINDAMYCIN: SIGNIFICANT CHANGE UP
-  DAPTOMYCIN: SIGNIFICANT CHANGE UP
-  ERYTHROMYCIN: SIGNIFICANT CHANGE UP
-  GENTAMICIN: SIGNIFICANT CHANGE UP
-  LINEZOLID: SIGNIFICANT CHANGE UP
-  OXACILLIN: SIGNIFICANT CHANGE UP
-  PENICILLIN: SIGNIFICANT CHANGE UP
-  RIFAMPIN: SIGNIFICANT CHANGE UP
-  TETRACYCLINE: SIGNIFICANT CHANGE UP
-  TRIMETHOPRIM/SULFAMETHOXAZOLE: SIGNIFICANT CHANGE UP
-  VANCOMYCIN: SIGNIFICANT CHANGE UP
CULTURE RESULTS: ABNORMAL
GLUCOSE BLDC GLUCOMTR-MCNC: 216 MG/DL — HIGH (ref 70–99)
GLUCOSE BLDC GLUCOMTR-MCNC: 250 MG/DL — HIGH (ref 70–99)
GLUCOSE BLDC GLUCOMTR-MCNC: 268 MG/DL — HIGH (ref 70–99)
GLUCOSE BLDC GLUCOMTR-MCNC: 336 MG/DL — HIGH (ref 70–99)
METHOD TYPE: SIGNIFICANT CHANGE UP
ORGANISM # SPEC MICROSCOPIC CNT: ABNORMAL
ORGANISM # SPEC MICROSCOPIC CNT: SIGNIFICANT CHANGE UP
SPECIMEN SOURCE: SIGNIFICANT CHANGE UP
VANCOMYCIN TROUGH SERPL-MCNC: 14 UG/ML — SIGNIFICANT CHANGE UP (ref 10–20)

## 2024-06-05 PROCEDURE — 99232 SBSQ HOSP IP/OBS MODERATE 35: CPT

## 2024-06-05 RX ORDER — ASPIRIN/CALCIUM CARB/MAGNESIUM 324 MG
81 TABLET ORAL DAILY
Refills: 0 | Status: DISCONTINUED | OUTPATIENT
Start: 2024-06-05 | End: 2024-06-13

## 2024-06-05 RX ORDER — CEFTRIAXONE 500 MG/1
2000 INJECTION, POWDER, FOR SOLUTION INTRAMUSCULAR; INTRAVENOUS EVERY 24 HOURS
Refills: 0 | Status: DISCONTINUED | OUTPATIENT
Start: 2024-06-06 | End: 2024-06-07

## 2024-06-05 RX ORDER — METOPROLOL TARTRATE 50 MG
100 TABLET ORAL DAILY
Refills: 0 | Status: DISCONTINUED | OUTPATIENT
Start: 2024-06-05 | End: 2024-06-13

## 2024-06-05 RX ORDER — CEFTRIAXONE 500 MG/1
2000 INJECTION, POWDER, FOR SOLUTION INTRAMUSCULAR; INTRAVENOUS ONCE
Refills: 0 | Status: COMPLETED | OUTPATIENT
Start: 2024-06-05 | End: 2024-06-05

## 2024-06-05 RX ORDER — PANTOPRAZOLE SODIUM 20 MG/1
40 TABLET, DELAYED RELEASE ORAL
Refills: 0 | Status: DISCONTINUED | OUTPATIENT
Start: 2024-06-05 | End: 2024-06-09

## 2024-06-05 RX ORDER — CEFTRIAXONE 500 MG/1
INJECTION, POWDER, FOR SOLUTION INTRAMUSCULAR; INTRAVENOUS
Refills: 0 | Status: DISCONTINUED | OUTPATIENT
Start: 2024-06-05 | End: 2024-06-07

## 2024-06-05 RX ORDER — DILTIAZEM HCL 120 MG
360 CAPSULE, EXT RELEASE 24 HR ORAL DAILY
Refills: 0 | Status: DISCONTINUED | OUTPATIENT
Start: 2024-06-05 | End: 2024-06-13

## 2024-06-05 RX ORDER — APIXABAN 2.5 MG/1
5 TABLET, FILM COATED ORAL EVERY 12 HOURS
Refills: 0 | Status: DISCONTINUED | OUTPATIENT
Start: 2024-06-05 | End: 2024-06-13

## 2024-06-05 RX ADMIN — Medication 1 GRAM(S): at 17:33

## 2024-06-05 RX ADMIN — Medication 10 MILLIEQUIVALENT(S): at 17:32

## 2024-06-05 RX ADMIN — Medication 5 MILLIGRAM(S): at 06:48

## 2024-06-05 RX ADMIN — Medication 90 MILLIGRAM(S): at 06:48

## 2024-06-05 RX ADMIN — Medication 8: at 12:24

## 2024-06-05 RX ADMIN — Medication 500 MILLIGRAM(S): at 11:27

## 2024-06-05 RX ADMIN — Medication 5 MILLIGRAM(S): at 21:35

## 2024-06-05 RX ADMIN — Medication 90 MILLIGRAM(S): at 11:27

## 2024-06-05 RX ADMIN — Medication 10 MILLIEQUIVALENT(S): at 06:49

## 2024-06-05 RX ADMIN — Medication 25 MILLIGRAM(S): at 11:28

## 2024-06-05 RX ADMIN — Medication 4: at 08:44

## 2024-06-05 RX ADMIN — Medication 10 MILLIEQUIVALENT(S): at 11:27

## 2024-06-05 RX ADMIN — Medication 25 MILLIGRAM(S): at 06:48

## 2024-06-05 RX ADMIN — Medication 325 MILLIGRAM(S): at 17:32

## 2024-06-05 RX ADMIN — Medication 10 MILLIEQUIVALENT(S): at 01:14

## 2024-06-05 RX ADMIN — POLYETHYLENE GLYCOL 3350 17 GRAM(S): 17 POWDER, FOR SOLUTION ORAL at 13:30

## 2024-06-05 RX ADMIN — TIOTROPIUM BROMIDE 2 PUFF(S): 18 CAPSULE ORAL; RESPIRATORY (INHALATION) at 06:49

## 2024-06-05 RX ADMIN — APIXABAN 5 MILLIGRAM(S): 2.5 TABLET, FILM COATED ORAL at 17:32

## 2024-06-05 RX ADMIN — MORPHINE SULFATE 2 MILLIGRAM(S): 50 CAPSULE, EXTENDED RELEASE ORAL at 21:35

## 2024-06-05 RX ADMIN — Medication 2: at 21:34

## 2024-06-05 RX ADMIN — GABAPENTIN 300 MILLIGRAM(S): 400 CAPSULE ORAL at 06:49

## 2024-06-05 RX ADMIN — Medication 250 MILLIGRAM(S): at 21:35

## 2024-06-05 RX ADMIN — ATORVASTATIN CALCIUM 40 MILLIGRAM(S): 80 TABLET, FILM COATED ORAL at 21:35

## 2024-06-05 RX ADMIN — Medication 25 MILLIGRAM(S): at 13:42

## 2024-06-05 RX ADMIN — Medication 5 MILLIGRAM(S): at 13:40

## 2024-06-05 RX ADMIN — Medication 4: at 17:33

## 2024-06-05 RX ADMIN — Medication 20 MILLIGRAM(S): at 06:48

## 2024-06-05 RX ADMIN — Medication 25 MILLIGRAM(S): at 21:35

## 2024-06-05 RX ADMIN — Medication 1 TABLET(S): at 11:28

## 2024-06-05 RX ADMIN — HEPARIN SODIUM 5000 UNIT(S): 5000 INJECTION INTRAVENOUS; SUBCUTANEOUS at 06:49

## 2024-06-05 RX ADMIN — MORPHINE SULFATE 2 MILLIGRAM(S): 50 CAPSULE, EXTENDED RELEASE ORAL at 22:20

## 2024-06-05 RX ADMIN — OXYCODONE HYDROCHLORIDE 10 MILLIGRAM(S): 5 TABLET ORAL at 06:49

## 2024-06-05 RX ADMIN — Medication 100 MILLIGRAM(S): at 17:33

## 2024-06-05 RX ADMIN — LORATADINE 10 MILLIGRAM(S): 10 TABLET ORAL at 11:27

## 2024-06-05 RX ADMIN — CEFTRIAXONE 2000 MILLIGRAM(S): 500 INJECTION, POWDER, FOR SOLUTION INTRAMUSCULAR; INTRAVENOUS at 11:26

## 2024-06-05 RX ADMIN — MORPHINE SULFATE 2 MILLIGRAM(S): 50 CAPSULE, EXTENDED RELEASE ORAL at 14:39

## 2024-06-05 RX ADMIN — OXYCODONE HYDROCHLORIDE 10 MILLIGRAM(S): 5 TABLET ORAL at 07:25

## 2024-06-05 RX ADMIN — OXYCODONE HYDROCHLORIDE 10 MILLIGRAM(S): 5 TABLET ORAL at 17:31

## 2024-06-05 RX ADMIN — Medication 25 MILLIGRAM(S): at 01:14

## 2024-06-05 RX ADMIN — FAMOTIDINE 20 MILLIGRAM(S): 10 INJECTION INTRAVENOUS at 11:27

## 2024-06-05 RX ADMIN — Medication 325 MILLIGRAM(S): at 06:48

## 2024-06-05 RX ADMIN — Medication 250 MILLIGRAM(S): at 09:02

## 2024-06-05 RX ADMIN — SENNA PLUS 2 TABLET(S): 8.6 TABLET ORAL at 21:35

## 2024-06-05 RX ADMIN — GABAPENTIN 300 MILLIGRAM(S): 400 CAPSULE ORAL at 17:32

## 2024-06-05 RX ADMIN — MORPHINE SULFATE 2 MILLIGRAM(S): 50 CAPSULE, EXTENDED RELEASE ORAL at 13:52

## 2024-06-05 RX ADMIN — Medication 1000 UNIT(S): at 11:27

## 2024-06-05 RX ADMIN — Medication 10 MILLIGRAM(S): at 17:32

## 2024-06-05 RX ADMIN — MORPHINE SULFATE 2 MILLIGRAM(S): 50 CAPSULE, EXTENDED RELEASE ORAL at 00:33

## 2024-06-05 RX ADMIN — Medication 360 MILLIGRAM(S): at 17:32

## 2024-06-05 RX ADMIN — NALOXEGOL OXALATE 25 MILLIGRAM(S): 12.5 TABLET, FILM COATED ORAL at 13:40

## 2024-06-05 RX ADMIN — Medication 10 MILLIGRAM(S): at 06:48

## 2024-06-05 RX ADMIN — Medication 1 GRAM(S): at 06:48

## 2024-06-05 NOTE — PROGRESS NOTE ADULT - SUBJECTIVE AND OBJECTIVE BOX
Patient is a 53y old  Male who presents with a chief complaint of ankle wound (05 Jun 2024 12:07)      INTERVAL HPI/OVERNIGHT EVENTS:  T(C): 36.5 (06-05-24 @ 11:10), Max: 37.2 (06-05-24 @ 01:18)  HR: 97 (06-05-24 @ 11:10) (80 - 97)  BP: 116/81 (06-05-24 @ 11:10) (100/60 - 136/74)  RR: 18 (06-05-24 @ 11:10) (17 - 18)  SpO2: 92% (06-05-24 @ 11:10) (90% - 96%)  Wt(kg): --  I&O's Summary    04 Jun 2024 07:01  -  05 Jun 2024 07:00  --------------------------------------------------------  IN: 240 mL / OUT: 2650 mL / NET: -2410 mL        LABS:                        12.6   11.30 )-----------( 296      ( 04 Jun 2024 09:05 )             37.9     06-04    134<L>  |  98  |  17  ----------------------------<  334<H>  3.4<L>   |  29  |  0.95    Ca    8.8      04 Jun 2024 09:05        Urinalysis Basic - ( 04 Jun 2024 09:05 )    Color: x / Appearance: x / SG: x / pH: x  Gluc: 334 mg/dL / Ketone: x  / Bili: x / Urobili: x   Blood: x / Protein: x / Nitrite: x   Leuk Esterase: x / RBC: x / WBC x   Sq Epi: x / Non Sq Epi: x / Bacteria: x      CAPILLARY BLOOD GLUCOSE      POCT Blood Glucose.: 336 mg/dL (05 Jun 2024 11:59)  POCT Blood Glucose.: 216 mg/dL (05 Jun 2024 08:15)  POCT Blood Glucose.: 245 mg/dL (04 Jun 2024 21:43)  POCT Blood Glucose.: 118 mg/dL (04 Jun 2024 16:45)        Urinalysis Basic - ( 04 Jun 2024 09:05 )    Color: x / Appearance: x / SG: x / pH: x  Gluc: 334 mg/dL / Ketone: x  / Bili: x / Urobili: x   Blood: x / Protein: x / Nitrite: x   Leuk Esterase: x / RBC: x / WBC x   Sq Epi: x / Non Sq Epi: x / Bacteria: x        MEDICATIONS  (STANDING):  apixaban 5 milliGRAM(s) Oral every 12 hours  ascorbic acid 500 milliGRAM(s) Oral daily  aspirin enteric coated 81 milliGRAM(s) Oral daily  atorvastatin 40 milliGRAM(s) Oral at bedtime  cefTRIAXone   IVPB      cholecalciferol 1000 Unit(s) Oral daily  dextrose 50% Injectable 12.5 Gram(s) IV Push once  dextrose 50% Injectable 25 Gram(s) IV Push once  dextrose Oral Gel 15 Gram(s) Oral once  diltiazem    milliGRAM(s) Oral daily  ferrous    sulfate 325 milliGRAM(s) Oral two times a day  gabapentin 300 milliGRAM(s) Oral every 12 hours  glucagon  Injectable 1 milliGRAM(s) IntraMuscular once  insulin lispro (ADMELOG) corrective regimen sliding scale   SubCutaneous three times a day before meals  insulin lispro (ADMELOG) corrective regimen sliding scale   SubCutaneous at bedtime  loratadine 10 milliGRAM(s) Oral daily  melatonin 5 milliGRAM(s) Oral at bedtime  metoclopramide 5 milliGRAM(s) Oral three times a day  metoprolol succinate  milliGRAM(s) Oral daily  multivitamin/minerals 1 Tablet(s) Oral daily  naloxegol 25 milliGRAM(s) Oral daily  oxybutynin 10 milliGRAM(s) Oral two times a day  oxyCODONE    IR 10 milliGRAM(s) Oral two times a day  pantoprazole    Tablet 40 milliGRAM(s) Oral before breakfast  polyethylene glycol 3350 17 Gram(s) Oral daily  potassium chloride    Tablet ER 10 milliEquivalent(s) Oral four times a day  senna 2 Tablet(s) Oral at bedtime  sucralfate 1 Gram(s) Oral two times a day  tiotropium 2.5 MICROgram(s) Inhaler 2 Puff(s) Inhalation daily  torsemide 20 milliGRAM(s) Oral two times a day  vancomycin  IVPB 1000 milliGRAM(s) IV Intermittent every 12 hours    MEDICATIONS  (PRN):  acetaminophen     Tablet .. 650 milliGRAM(s) Oral every 6 hours PRN Temp greater or equal to 38C (100.4F), Mild Pain (1 - 3)  aluminum hydroxide/magnesium hydroxide/simethicone Suspension 30 milliLiter(s) Oral every 4 hours PRN Dyspepsia  diphenhydrAMINE 25 milliGRAM(s) Oral every 6 hours PRN Rash and/or Itching  morphine  - Injectable 2 milliGRAM(s) IV Push every 6 hours PRN Severe Pain (7 - 10)  ondansetron Injectable 4 milliGRAM(s) IV Push every 8 hours PRN Nausea and/or Vomiting  sodium chloride 0.65% Nasal 1 Spray(s) Both Nostrils three times a day PRN Nasal Congestion      REVIEW OF SYSTEMS:  CONSTITUTIONAL: No fever, weight loss, or fatigue  EYES: No eye pain, visual disturbances, or discharge  ENMT:  No difficulty hearing, tinnitus, vertigo; No sinus or throat pain  NECK: No pain or stiffness  RESPIRATORY: No cough, wheezing, chills or hemoptysis; No shortness of breath  CARDIOVASCULAR: No chest pain, palpitations, dizziness, or leg swelling  GASTROINTESTINAL: No abdominal or epigastric pain. No nausea, vomiting, or hematemesis; No diarrhea or constipation. No melena or hematochezia.  GENITOURINARY: No dysuria, frequency, hematuria, or incontinence  NEUROLOGICAL: No headaches, memory loss, loss of strength, numbness, or tremors  SKIN: No itching, burning, rashes, or lesions   LYMPH NODES: No enlarged glands  ENDOCRINE: No heat or cold intolerance; No hair loss  MUSCULOSKELETAL: No joint pain or swelling; No muscle, back, or extremity pain  PSYCHIATRIC: No depression, anxiety, mood swings, or difficulty sleeping  HEME/LYMPH: No easy bruising, or bleeding gums  ALLERY AND IMMUNOLOGIC: No hives or eczema    RADIOLOGY & ADDITIONAL TESTS:    Imaging Personally Reviewed:  [x ] YES  [ ] NO    Consultant(s) Notes Reviewed:  [ x] YES  [ ] NO    PHYSICAL EXAM:  GENERAL: NAD, well-groomed, well-developed  HEAD:  Atraumatic, Normocephalic  EYES: EOMI, PERRLA, conjunctiva and sclera clear  ENMT: No tonsillar erythema, exudates, or enlargement; Moist mucous membranes, Good dentition, No lesions  NECK: Supple, No JVD, Normal thyroid  NERVOUS SYSTEM:  Alert & Oriented X3, Good concentration; Motor Strength 5/5 B/L upper and lower extremities; DTRs 2+ intact and symmetric  CHEST/LUNG: Clear to percussion bilaterally; No rales, rhonchi, wheezing, or rubs  HEART: Regular rate and rhythm; No murmurs, rubs, or gallops  ABDOMEN: Soft, Nontender, Nondistended; Bowel sounds present  EXTREMITIES:  2+ Peripheral Pulses, No clubbing, cyanosis, or edema  LYMPH: No lymphadenopathy noted  SKIN: scattered maculopapular rash    Care Discussed with Consultants/Other Providers [x ] YES  [ ] NO

## 2024-06-05 NOTE — SOCIAL WORK PROGRESS NOTE - NSSWPROGRESSNOTE_GEN_ALL_CORE
Pt remains on IV antibiotics and receiving wound care at this time. Pt will return to [facility] once medically stable and auth request has been sent to [employer]. Sw will continue to follow for safe discharge planning.

## 2024-06-05 NOTE — PROGRESS NOTE ADULT - SUBJECTIVE AND OBJECTIVE BOX
[facility], Division of Infectious Diseases  DELGADO Rodriguez Y. Patel, S. Shah, G. [facility]  [phone]    Name: SARAH MORRISON  Age: 53y  Gender: Male  MRN: [id number]    Interval History:  Patient seen and examined at bedside  No acute overnight events. Afebrile  No complaints  Notes reviewed    Antibiotics:  cefTRIAXone   IVPB      vancomycin  IVPB 1000 milliGRAM(s) IV Intermittent every 12 hours      Medications:  acetaminophen     Tablet .. 650 milliGRAM(s) Oral every 6 hours PRN  aluminum hydroxide/magnesium hydroxide/simethicone Suspension 30 milliLiter(s) Oral every 4 hours PRN  ascorbic acid 500 milliGRAM(s) Oral daily  atorvastatin 40 milliGRAM(s) Oral at bedtime  cefTRIAXone   IVPB      cholecalciferol 1000 Unit(s) Oral daily  dextrose 50% Injectable 12.5 Gram(s) IV Push once  dextrose 50% Injectable 25 Gram(s) IV Push once  dextrose Oral Gel 15 Gram(s) Oral once  diltiazem    Tablet 90 milliGRAM(s) Oral every 6 hours  diphenhydrAMINE 25 milliGRAM(s) Oral every 6 hours PRN  famotidine    Tablet 20 milliGRAM(s) Oral daily  ferrous    sulfate 325 milliGRAM(s) Oral two times a day  gabapentin 300 milliGRAM(s) Oral every 12 hours  glucagon  Injectable 1 milliGRAM(s) IntraMuscular once  heparin   Injectable 5000 Unit(s) SubCutaneous every 12 hours  insulin lispro (ADMELOG) corrective regimen sliding scale   SubCutaneous three times a day before meals  insulin lispro (ADMELOG) corrective regimen sliding scale   SubCutaneous at bedtime  loratadine 10 milliGRAM(s) Oral daily  melatonin 5 milliGRAM(s) Oral at bedtime  metoclopramide 5 milliGRAM(s) Oral three times a day  metoprolol tartrate 25 milliGRAM(s) Oral every 6 hours  morphine  - Injectable 2 milliGRAM(s) IV Push every 6 hours PRN  multivitamin/minerals 1 Tablet(s) Oral daily  naloxegol 25 milliGRAM(s) Oral daily  ondansetron Injectable 4 milliGRAM(s) IV Push every 8 hours PRN  oxybutynin 10 milliGRAM(s) Oral two times a day  oxyCODONE    IR 10 milliGRAM(s) Oral two times a day  polyethylene glycol 3350 17 Gram(s) Oral daily  potassium chloride    Tablet ER 10 milliEquivalent(s) Oral four times a day  senna 2 Tablet(s) Oral at bedtime  sodium chloride 0.65% Nasal 1 Spray(s) Both Nostrils three times a day PRN  sucralfate 1 Gram(s) Oral two times a day  tiotropium 2.5 MICROgram(s) Inhaler 2 Puff(s) Inhalation daily  torsemide 20 milliGRAM(s) Oral two times a day  vancomycin  IVPB 1000 milliGRAM(s) IV Intermittent every 12 hours      Review of Systems:  A 10-point review of systems was obtained.   Review of systems otherwise negative except as previously noted.    Allergies: No Known Drug Allergies  fish (Hives)    For details regarding the patient's past medical history, social history, family history, and other miscellaneous elements, please refer the initial infectious diseases consultation and/or the admitting history and physical examination for this admission.    Objective:  Vitals:   T(C): 36.5 (06-05-24 @ 11:10), Max: 37.2 (06-05-24 @ 01:18)  HR: 97 (06-05-24 @ 11:10) (60 - 97)  BP: 116/81 (06-05-24 @ 11:10) (100/60 - 136/74)  RR: 18 (06-05-24 @ 11:10) (17 - 18)  SpO2: 92% (06-05-24 @ 11:10) (90% - 97%)    Physical Examination:  General: no acute distress  HEENT: NC/AT, EOMI,   Cardio: S1, S2 heard, RRR, no murmurs  Resp: breath sounds heard bilaterally, no rales, wheezes or rhonchi  Abd: soft, NT, ND,   Ext: in dressing  Skin: warm, dry, no visible rash      Laboratory Studies:  CBC:                       12.6   11.30 )-----------( 296      ( 04 Jun 2024 09:05 )             37.9     CMP: 06-04    134<L>  |  98  |  17  ----------------------------<  334<H>  3.4<L>   |  29  |  0.95    Ca    8.8      04 Jun 2024 09:05        Urinalysis Basic - ( 04 Jun 2024 09:05 )    Color: x / Appearance: x / SG: x / pH: x  Gluc: 334 mg/dL / Ketone: x  / Bili: x / Urobili: x   Blood: x / Protein: x / Nitrite: x   Leuk Esterase: x / RBC: x / WBC x   Sq Epi: x / Non Sq Epi: x / Bacteria: x        Microbiology: reviewed    Culture - Tissue with Gram Stain (collected 06-04-24 @ 10:16)  Source: .Tissue Other, RIGHT CALCANEOUS BONE CULTURE  Gram Stain (06-04-24 @ 18:18):    Rare polymorphonuclear leukocytes per low power field    Few Gram positive cocci in pairs per oil power field    Few Gram Negative Rods per oil power field    Culture - Tissue with Gram Stain (collected 06-04-24 @ 10:16)  Source: .Tissue Other, RIGHT FOOT TISSUE  Gram Stain (06-04-24 @ 18:18):    Rare polymorphonuclear leukocytes per low power field    Few Gram positive cocci in pairs per oil power field    Few Gram Negative Rods per oil power field    Culture - Other (collected 06-03-24 @ 13:15)  Source: Wound Wound  Preliminary Report (06-04-24 @ 19:25):    Culture yields growth of greater than 3 colony types of    bacteria,  which may indicate contamination and normal jakub    Call client services within 7 days if further workup is clinically    indicated.    Culture includes    Numerous Staphylococcus aureus          Radiology: reviewed

## 2024-06-05 NOTE — CASE MANAGEMENT PROGRESS NOTE - NSCMPROGRESSNOTE_GEN_ALL_CORE
Discussed patient on rounds this morning and chart reviewed. Patient is s/p right heel debridement 6/4/24 and is on IV Vanco. SW following for transition planning back to [facility] when medically cleared. CM remains available.

## 2024-06-05 NOTE — PROGRESS NOTE ADULT - ASSESSMENT
Pt is a 53M w/ PMHx of Afib, CAD, DM presenting for R heel ulcer debridement.   S/p zosyn x7 day course at facility  Being admitted for debridement  Prior WCx MRSA/E. faecalis from 3/26/24  6/3 Now w/ uptrending WBC and drainage from wound   6/4 S/p Selective debridement of wound 04-Jun-2024 10:52:57  Parviz Todd    6/3 WCx   Numerous Staphylococcus aureus  6/4  TCx Few Gram positive cocci in pairs per oil power field    Few Gram Negative Rods per oil power field    Recommendations:   F/u pending cx as above  F/u pending path  C/w vancomycin, goal trough 15-20, dosing per pharmacy protocol  Adding ceftriaxone 2gm q24h in setting of GNR growth on tissue culture  Appreciate podiatry/wound care  Pain control, supportive measures and additional management per primary team  Further recs to follow pending above    Infectious Diseases will follow. Please call with any questions.  Anne-Marie Li M.D.  [facility] Division of Infectious Diseases [phone]  For after 5 P.M. and weekends, please call [phone]

## 2024-06-05 NOTE — PROGRESS NOTE ADULT - ASSESSMENT
53M w/ PMHx of Afib, CAD s/p stents, CVA, DM, HTN, pulmonary embolism on Eliquis,  osteomyelitis s/p debridement presenting  presents to emergency department from [facility] for R heel ulcer debridement.    Afib, CAD, stents, HTN, PE on ELiquis  - Known  Afib, initially RVR.    - S/p Dig load.  Rates have been controlled  - Continue Cardizem 90 mg PO Q6H.  Switch to long-acting Toprol 100 mg daily   - Continue Metoprolol 25mg changed to every 6 hours.  Switch to long-acting 360 mg daily   - Hold on further doses of Digoxin.  - Had pauses last admission, so would use caution.  No pauses noted while on tele.   - BP stable and controlled   - Resume Eliquis per surgery     - 3/18/24: Technically difficult ECHO w/ normal LV size and function EF 50-55%, mildly reduced RV systolic function, mod dilated LA and RA, mod to sev MR, mild to mod TR, PASP 36  - Euvolemic on exam with no O2 requirement  - Continue torsemide    - EKG showed A fib RVR, PVC @ 127.   - No evidence of any active ischemia   - Resume aspirin postop per surgery   - Continue statin     - S/p surgical debridement of the right heel with bone biopsy, possible partial calcanectomy today, 6/4  - Tolerated procedure well, cardiac status optimal post operatively     - Monitor and replete lytes, keep K>4, Mg>2.  - Will continue to follow.    Danny Arce, MS FNP, AGACNP  Nurse Practitioner- Cardiology   Please call on TEAMS

## 2024-06-05 NOTE — PROGRESS NOTE ADULT - SUBJECTIVE AND OBJECTIVE BOX
[facility] -- Brittany Lutz Pannella, Patel, Savella, Goodger, Cohen: Office # [phone]    Follow Up:  Hx AFib, CAD, Cardiac Optimization    Subjective/Observations: Patient seen and examined. Patient awake, alert, resting in bed. No complaints of chest pain, dyspnea, palpitations or dizziness.. No signs of orthopnea or PND. Denies any form of respiratory or cardiac discomfort.  Denies foot/heel pain    REVIEW OF SYSTEMS: All other review of systems are negative unless indicated above    PAST MEDICAL & SURGICAL HISTORY:  Diabetes    Diabetes mellitus with no complication    Afib    Hypertension    Hypertension    BPH (benign prostatic hyperplasia)    Perforated gastric ulcer  s/p emergent ex-lap omentopexy and plication 6/2019    Pulmonary embolism    History of non-ST elevation myocardial infarction (NSTEMI)    Osteomyelitis  s/p debridement    CAD S/P percutaneous coronary angioplasty    Cerebrovascular accident    H/O abdominal surgery    Perforated gastric ulcer    Traumatic amputation of left foot, initial encounter    MEDICATIONS  (STANDING):  ascorbic acid 500 milliGRAM(s) Oral daily  atorvastatin 40 milliGRAM(s) Oral at bedtime  cholecalciferol 1000 Unit(s) Oral daily  dextrose 50% Injectable 25 Gram(s) IV Push once  dextrose 50% Injectable 12.5 Gram(s) IV Push once  dextrose Oral Gel 15 Gram(s) Oral once  diltiazem    Tablet 90 milliGRAM(s) Oral every 6 hours  famotidine    Tablet 20 milliGRAM(s) Oral daily  ferrous    sulfate 325 milliGRAM(s) Oral two times a day  gabapentin 300 milliGRAM(s) Oral every 12 hours  glucagon  Injectable 1 milliGRAM(s) IntraMuscular once  heparin   Injectable 5000 Unit(s) SubCutaneous every 12 hours  insulin lispro (ADMELOG) corrective regimen sliding scale   SubCutaneous three times a day before meals  insulin lispro (ADMELOG) corrective regimen sliding scale   SubCutaneous at bedtime  loratadine 10 milliGRAM(s) Oral daily  melatonin 5 milliGRAM(s) Oral at bedtime  metoclopramide 5 milliGRAM(s) Oral three times a day  metoprolol tartrate 25 milliGRAM(s) Oral every 6 hours  multivitamin/minerals 1 Tablet(s) Oral daily  naloxegol 25 milliGRAM(s) Oral daily  oxybutynin 10 milliGRAM(s) Oral two times a day  oxyCODONE    IR 10 milliGRAM(s) Oral two times a day  polyethylene glycol 3350 17 Gram(s) Oral daily  potassium chloride    Tablet ER 10 milliEquivalent(s) Oral four times a day  senna 2 Tablet(s) Oral at bedtime  sucralfate 1 Gram(s) Oral two times a day  tiotropium 2.5 MICROgram(s) Inhaler 2 Puff(s) Inhalation daily  torsemide 20 milliGRAM(s) Oral two times a day  vancomycin  IVPB 1000 milliGRAM(s) IV Intermittent every 12 hours    MEDICATIONS  (PRN):  acetaminophen     Tablet .. 650 milliGRAM(s) Oral every 6 hours PRN Temp greater or equal to 38C (100.4F), Mild Pain (1 - 3)  aluminum hydroxide/magnesium hydroxide/simethicone Suspension 30 milliLiter(s) Oral every 4 hours PRN Dyspepsia  diphenhydrAMINE 25 milliGRAM(s) Oral every 6 hours PRN Rash and/or Itching  morphine  - Injectable 2 milliGRAM(s) IV Push every 6 hours PRN Severe Pain (7 - 10)  ondansetron Injectable 4 milliGRAM(s) IV Push every 8 hours PRN Nausea and/or Vomiting  sodium chloride 0.65% Nasal 1 Spray(s) Both Nostrils three times a day PRN Nasal Congestion    Allergies    No Known Drug Allergies  fish (Hives)    Intolerances      Vital Signs Last 24 Hrs  T(C): 36.9 (05 Jun 2024 04:55), Max: 37.2 (05 Jun 2024 01:18)  T(F): 98.5 (05 Jun 2024 04:55), Max: 98.9 (05 Jun 2024 01:18)  HR: 88 (05 Jun 2024 04:55) (60 - 88)  BP: 105/68 (05 Jun 2024 04:55) (98/56 - 136/74)  BP(mean): --  RR: 17 (05 Jun 2024 04:55) (12 - 17)  SpO2: 90% (05 Jun 2024 04:55) (57% - 97%)    Parameters below as of 05 Jun 2024 04:55  Patient On (Oxygen Delivery Method): room air      I&O's Summary    04 Jun 2024 07:01  -  05 Jun 2024 07:00  --------------------------------------------------------  IN: 240 mL / OUT: 2650 mL / NET: -2410 mL      Weight (kg): 106.6 (06-04 @ 09:21)    TELE: Not on telemetry   PHYSICAL EXAM:  Constitutional: NAD, awake and alert  HEENT: Moist Mucous Membranes, Anicteric  Pulmonary: Non-labored, breath sounds are clear bilaterally, No wheezing, rales or rhonchi  Cardiovascular: Regular, S1 and S2, + murmurs, No rubs, gallops or clicks  Gastrointestinal:  soft, nontender, nondistended   Lymph: No peripheral edema. No lymphadenopathy.   Skin: No visible rashes Left foot DSD intact. Open blister BLE  Psych:  Mood & affect appropriate      LABS: All Labs Reviewed:                        12.6   11.30 )-----------( 296      ( 04 Jun 2024 09:05 )             37.9                         12.2   10.78 )-----------( 290      ( 03 Jun 2024 08:00 )             38.4                         12.8   12.41 )-----------( 333      ( 02 Jun 2024 08:55 )             40.3     04 Jun 2024 09:05    134    |  98     |  17     ----------------------------<  334    3.4     |  29     |  0.95   03 Jun 2024 08:00    139    |  102    |  17     ----------------------------<  232    3.8     |  32     |  1.10   02 Jun 2024 08:55    139    |  105    |  14     ----------------------------<  242    4.0     |  32     |  1.10     Ca    8.8        04 Jun 2024 09:05  Ca    9.0        03 Jun 2024 08:00  Ca    9.2        02 Jun 2024 08:55         12 Lead ECG:   Ventricular Rate 127 BPM    QRS Duration 82 ms    Q-T Interval 276 ms    QTC Calculation(Bazett) 401 ms    R Axis 13 degrees    T Axis 5 degrees    Diagnosis Line Atrial fibrillation with rapid ventricular response with premature ventricular or aberrantly conducted complexes  Low voltage QRS  Cannot rule out Anterior infarct , age undetermined  Abnormal ECG    Confirmed by darien France ([id number]) on 5/28/2024 2:53:37 PM (05-28-24 @ 11:53)      TRANSTHORACIC ECHOCARDIOGRAM REPORT  ________________________________________________________________________________                                      _______       Pt. Name:       SARAH MORRISON Study Date:    3/18/2024  MRN:            [id number]         YOB: 1970  Accession #:    [id number]        Age:           53 years  Account#:       [id number]       Gender:        M  Heart Rate:                      Height:        74.02 in (188.00 cm)  Rhythm:                          Weight:        244.71 lb (111.00 kg)  Blood Pressure: 100/60 mmHg      BSA/BMI:       2.37 m² / 31.41 kg/m²  ________________________________________________________________________________________  Referring Physician:    [id number] Hill Brizuela  Interpreting Physician: Mary Maya MD  Primary Sonographer:    Mounika FELDMAN    CPT:               ECHO TTE WO CON COMP W DOPP - 52084.m  Indication(s):     Dyspnea, unspecified - R06.00  Procedure:         Transthoracic echocardiogram with 2-D, M-mode and complete                     spectral and color flow Doppler.  Ordering Location: [facility]  Admission Status:  Inpatient  Study Information: Image quality for this study is technically difficult.    _______________________________________________________________________________________     CONCLUSIONS:      1. Technically difficult image quality.   2. Left ventricular cavity is normal in size. Left ventricular wall thickness is normal. Left ventricular systolic function is normal with an ejection fraction visually estimated at 50 to 55 %.   3. Normal right ventricular cavity size, with normal wall thickness, and mildly reduced systolic function.   4. The left atrium is moderately dilated.   5. The right atrium is moderately dilated.   6. Moderate to severe mitral regurgitation.   7. Mild to moderate tricuspid regurgitation.   8. Estimated pulmonary artery systolic pressure is 36 mmHg, consistent with mild pulmonary hypertension.   9. The inferior vena cava is dilated measuring 2.40 cm in diameter, (dilated >2.1cm) with normal inspiratory collapse (normal >50%) consistent with mildly elevated right atrial pressure (~8, range 5-10mmHg).  10. Trace pericardial effusion.    ________________________________________________________________________________________  FINDINGS:     Left Ventricle:  The left ventricular cavity is normal in size. Left ventricular wall thickness is normal. Left ventricular systolic function is normal with an ejection fraction visually estimated at 50 to 55%.The left ventricular diastolic function is indeterminate.     Right Ventricle:  The right ventricular cavity is normal in size, with normal wall thickness and mildly reduced systolic function.     Left Atrium:  The left atrium is moderately dilated.     Right Atrium:  The right atrium is moderately dilated.     Aortic Valve:  The aortic valve anatomy cannot be determined with normal systolic excursion. There is no aortic valve stenosis.     Mitral Valve:  There is mild calcification of the mitralvalve annulus. There is moderate to severe mitral regurgitation.     Tricuspid Valve:  Structurally normal tricuspid valve with normal leaflet excursion. There is mild to moderate tricuspid regurgitation. Estimated pulmonary artery systolic pressure is 36 mmHg, consistent with mild pulmonary hypertension.     Pulmonic Valve:  The pulmonic valve was not well visualized. There is trace pulmonic regurgitation.     Pericardium:  There is a trace pericardial effusion.     Systemic Veins:  The inferiorvena cava is dilated measuring 2.40 cm in diameter, (dilated >2.1cm) with normal inspiratory collapse (normal >50%) consistent with mildly elevated right atrial pressure (~8, range 5-10mmHg).  ____________________________________________________________________  QUANTITATIVE DATA:  Left Ventricle Measurements: (Indexed to BSA)     IVSd (2D):   1.9 cm  LVPWd (2D):  1.5 cm  LVIDd (2D):  5.3 cm  LVIDs (2D):  3.9 cm  LV Mass:     413 g  174.4 g/m²  Visualized LV EF%: 50 to 55%     MV E Vmax:    1.15 m/s  e' lateral:   10.90 cm/s  e' medial:    10.50 cm/s  E/e' lateral: 10.56  E/e' medial:  12.00  E/e' Average: 10.76  MV DT:        137 msec    Aorta Measurements: (Normal range) (Indexed to BSA)     Sinuses of Valsalva: 3.50 cm (3.1 - 3.7 cm)       Left Atrium Measurements: (Indexed to BSA)  LA Diam 2D: 3.99 cm       LVOT / RVOT/ Qp/Qs Data: (Indexed to BSA)  LVOT Diameter: 2.22 cm    Mitral Valve Measurements:     MV E Vmax: 1.2 m/s  Tricuspid Valve Measurements:     TR Vmax:          2.6m/s  TR Peak Gradient: 27.7 mmHg  RA Pressure:      8 mmHg  PASP:             36 mmHg    ________________________________________________________________________________________  Electronically signed on 3/19/2024 at 11:27:59 AM by Mary Maya MD  *** Final *** [facility] -- Brittany Lutz Pannella, Patel, Savella, Goodger, Cohen: Office # [phone]    Follow Up:  Hx AFib, CAD, Cardiac Optimization    Subjective/Observations: Patient seen and examined. Patient awake, alert, resting in bed. No complaints of chest pain, dyspnea, palpitations or dizziness.. No signs of orthopnea or PND. Denies any form of respiratory or cardiac discomfort.  Denies foot/heel pain    REVIEW OF SYSTEMS: All other review of systems are negative unless indicated above    PAST MEDICAL & SURGICAL HISTORY:  Diabetes    Diabetes mellitus with no complication    Afib    Hypertension    Hypertension    BPH (benign prostatic hyperplasia)    Perforated gastric ulcer  s/p emergent ex-lap omentopexy and plication 6/2019    Pulmonary embolism    History of non-ST elevation myocardial infarction (NSTEMI)    Osteomyelitis  s/p debridement    CAD S/P percutaneous coronary angioplasty    Cerebrovascular accident    H/O abdominal surgery    Perforated gastric ulcer    Traumatic amputation of left foot, initial encounter    MEDICATIONS  (STANDING):  ascorbic acid 500 milliGRAM(s) Oral daily  atorvastatin 40 milliGRAM(s) Oral at bedtime  cholecalciferol 1000 Unit(s) Oral daily  dextrose 50% Injectable 25 Gram(s) IV Push once  dextrose 50% Injectable 12.5 Gram(s) IV Push once  dextrose Oral Gel 15 Gram(s) Oral once  diltiazem    Tablet 90 milliGRAM(s) Oral every 6 hours  famotidine    Tablet 20 milliGRAM(s) Oral daily  ferrous    sulfate 325 milliGRAM(s) Oral two times a day  gabapentin 300 milliGRAM(s) Oral every 12 hours  glucagon  Injectable 1 milliGRAM(s) IntraMuscular once  heparin   Injectable 5000 Unit(s) SubCutaneous every 12 hours  insulin lispro (ADMELOG) corrective regimen sliding scale   SubCutaneous three times a day before meals  insulin lispro (ADMELOG) corrective regimen sliding scale   SubCutaneous at bedtime  loratadine 10 milliGRAM(s) Oral daily  melatonin 5 milliGRAM(s) Oral at bedtime  metoclopramide 5 milliGRAM(s) Oral three times a day  metoprolol tartrate 25 milliGRAM(s) Oral every 6 hours  multivitamin/minerals 1 Tablet(s) Oral daily  naloxegol 25 milliGRAM(s) Oral daily  oxybutynin 10 milliGRAM(s) Oral two times a day  oxyCODONE    IR 10 milliGRAM(s) Oral two times a day  polyethylene glycol 3350 17 Gram(s) Oral daily  potassium chloride    Tablet ER 10 milliEquivalent(s) Oral four times a day  senna 2 Tablet(s) Oral at bedtime  sucralfate 1 Gram(s) Oral two times a day  tiotropium 2.5 MICROgram(s) Inhaler 2 Puff(s) Inhalation daily  torsemide 20 milliGRAM(s) Oral two times a day  vancomycin  IVPB 1000 milliGRAM(s) IV Intermittent every 12 hours    MEDICATIONS  (PRN):  acetaminophen     Tablet .. 650 milliGRAM(s) Oral every 6 hours PRN Temp greater or equal to 38C (100.4F), Mild Pain (1 - 3)  aluminum hydroxide/magnesium hydroxide/simethicone Suspension 30 milliLiter(s) Oral every 4 hours PRN Dyspepsia  diphenhydrAMINE 25 milliGRAM(s) Oral every 6 hours PRN Rash and/or Itching  morphine  - Injectable 2 milliGRAM(s) IV Push every 6 hours PRN Severe Pain (7 - 10)  ondansetron Injectable 4 milliGRAM(s) IV Push every 8 hours PRN Nausea and/or Vomiting  sodium chloride 0.65% Nasal 1 Spray(s) Both Nostrils three times a day PRN Nasal Congestion    Allergies    No Known Drug Allergies  fish (Hives)    Intolerances      Vital Signs Last 24 Hrs  T(C): 36.9 (05 Jun 2024 04:55), Max: 37.2 (05 Jun 2024 01:18)  T(F): 98.5 (05 Jun 2024 04:55), Max: 98.9 (05 Jun 2024 01:18)  HR: 88 (05 Jun 2024 04:55) (60 - 88)  BP: 105/68 (05 Jun 2024 04:55) (98/56 - 136/74)  BP(mean): --  RR: 17 (05 Jun 2024 04:55) (12 - 17)  SpO2: 90% (05 Jun 2024 04:55) (57% - 97%)    Parameters below as of 05 Jun 2024 04:55  Patient On (Oxygen Delivery Method): room air      I&O's Summary    04 Jun 2024 07:01  -  05 Jun 2024 07:00  --------------------------------------------------------  IN: 240 mL / OUT: 2650 mL / NET: -2410 mL      Weight (kg): 106.6 (06-04 @ 09:21)    TELE: Not on telemetry   PHYSICAL EXAM:  Constitutional: NAD, awake and alert  HEENT: Moist Mucous Membranes, Anicteric  Pulmonary: Non-labored, breath sounds are clear bilaterally, No wheezing, rales or rhonchi  Cardiovascular: Regular, S1 and S2, + murmurs, No rubs, gallops or clicks  Gastrointestinal:  soft, nontender, nondistended   Lymph: Trace peripheral edema. No lymphadenopathy.   Skin: No visible rashes B/L foot DSD intact. Open blister BLE  Psych:  Mood & affect appropriate      LABS: All Labs Reviewed:                        12.6   11.30 )-----------( 296      ( 04 Jun 2024 09:05 )             37.9                         12.2   10.78 )-----------( 290      ( 03 Jun 2024 08:00 )             38.4                         12.8   12.41 )-----------( 333      ( 02 Jun 2024 08:55 )             40.3     04 Jun 2024 09:05    134    |  98     |  17     ----------------------------<  334    3.4     |  29     |  0.95   03 Jun 2024 08:00    139    |  102    |  17     ----------------------------<  232    3.8     |  32     |  1.10   02 Jun 2024 08:55    139    |  105    |  14     ----------------------------<  242    4.0     |  32     |  1.10     Ca    8.8        04 Jun 2024 09:05  Ca    9.0        03 Jun 2024 08:00  Ca    9.2        02 Jun 2024 08:55         12 Lead ECG:   Ventricular Rate 127 BPM    QRS Duration 82 ms    Q-T Interval 276 ms    QTC Calculation(Bazett) 401 ms    R Axis 13 degrees    T Axis 5 degrees    Diagnosis Line Atrial fibrillation with rapid ventricular response with premature ventricular or aberrantly conducted complexes  Low voltage QRS  Cannot rule out Anterior infarct , age undetermined  Abnormal ECG    Confirmed by darien France ([id number]) on 5/28/2024 2:53:37 PM (05-28-24 @ 11:53)      TRANSTHORACIC ECHOCARDIOGRAM REPORT  ________________________________________________________________________________                                      _______       Pt. Name:       SARAH MORRISON Study Date:    3/18/2024  MRN:            [id number]         YOB: 1970  Accession #:    [id number]        Age:           53 years  Account#:       [id number]       Gender:        M  Heart Rate:                      Height:        74.02 in (188.00 cm)  Rhythm:                          Weight:        244.71 lb (111.00 kg)  Blood Pressure: 100/60 mmHg      BSA/BMI:       2.37 m² / 31.41 kg/m²  ________________________________________________________________________________________  Referring Physician:    [id number] Hill Brizuela  Interpreting Physician: Mary Maya MD  Primary Sonographer:    Mounika FELDMAN    CPT:               ECHO TTE WO CON COMP W DOPP - 21535.m  Indication(s):     Dyspnea, unspecified - R06.00  Procedure:         Transthoracic echocardiogram with 2-D, M-mode and complete                     spectral and color flow Doppler.  Ordering Location: [facility]  Admission Status:  Inpatient  Study Information: Image quality for this study is technically difficult.    _______________________________________________________________________________________     CONCLUSIONS:      1. Technically difficult image quality.   2. Left ventricular cavity is normal in size. Left ventricular wall thickness is normal. Left ventricular systolic function is normal with an ejection fraction visually estimated at 50 to 55 %.   3. Normal right ventricular cavity size, with normal wall thickness, and mildly reduced systolic function.   4. The left atrium is moderately dilated.   5. The right atrium is moderately dilated.   6. Moderate to severe mitral regurgitation.   7. Mild to moderate tricuspid regurgitation.   8. Estimated pulmonary artery systolic pressure is 36 mmHg, consistent with mild pulmonary hypertension.   9. The inferior vena cava is dilated measuring 2.40 cm in diameter, (dilated >2.1cm) with normal inspiratory collapse (normal >50%) consistent with mildly elevated right atrial pressure (~8, range 5-10mmHg).  10. Trace pericardial effusion.    ________________________________________________________________________________________  FINDINGS:     Left Ventricle:  The left ventricular cavity is normal in size. Left ventricular wall thickness is normal. Left ventricular systolic function is normal with an ejection fraction visually estimated at 50 to 55%.The left ventricular diastolic function is indeterminate.     Right Ventricle:  The right ventricular cavity is normal in size, with normal wall thickness and mildly reduced systolic function.     Left Atrium:  The left atrium is moderately dilated.     Right Atrium:  The right atrium is moderately dilated.     Aortic Valve:  The aortic valve anatomy cannot be determined with normal systolic excursion. There is no aortic valve stenosis.     Mitral Valve:  There is mild calcification of the mitralvalve annulus. There is moderate to severe mitral regurgitation.     Tricuspid Valve:  Structurally normal tricuspid valve with normal leaflet excursion. There is mild to moderate tricuspid regurgitation. Estimated pulmonary artery systolic pressure is 36 mmHg, consistent with mild pulmonary hypertension.     Pulmonic Valve:  The pulmonic valve was not well visualized. There is trace pulmonic regurgitation.     Pericardium:  There is a trace pericardial effusion.     Systemic Veins:  The inferiorvena cava is dilated measuring 2.40 cm in diameter, (dilated >2.1cm) with normal inspiratory collapse (normal >50%) consistent with mildly elevated right atrial pressure (~8, range 5-10mmHg).  ____________________________________________________________________  QUANTITATIVE DATA:  Left Ventricle Measurements: (Indexed to BSA)     IVSd (2D):   1.9 cm  LVPWd (2D):  1.5 cm  LVIDd (2D):  5.3 cm  LVIDs (2D):  3.9 cm  LV Mass:     413 g  174.4 g/m²  Visualized LV EF%: 50 to 55%     MV E Vmax:    1.15 m/s  e' lateral:   10.90 cm/s  e' medial:    10.50 cm/s  E/e' lateral: 10.56  E/e' medial:  12.00  E/e' Average: 10.76  MV DT:        137 msec    Aorta Measurements: (Normal range) (Indexed to BSA)     Sinuses of Valsalva: 3.50 cm (3.1 - 3.7 cm)       Left Atrium Measurements: (Indexed to BSA)  LA Diam 2D: 3.99 cm       LVOT / RVOT/ Qp/Qs Data: (Indexed to BSA)  LVOT Diameter: 2.22 cm    Mitral Valve Measurements:     MV E Vmax: 1.2 m/s  Tricuspid Valve Measurements:     TR Vmax:          2.6m/s  TR Peak Gradient: 27.7 mmHg  RA Pressure:      8 mmHg  PASP:             36 mmHg    ________________________________________________________________________________________  Electronically signed on 3/19/2024 at 11:27:59 AM by Mary Maya MD  *** Final ***

## 2024-06-06 LAB
-  AMPICILLIN/SULBACTAM: SIGNIFICANT CHANGE UP
-  AMPICILLIN/SULBACTAM: SIGNIFICANT CHANGE UP
-  AMPICILLIN: SIGNIFICANT CHANGE UP
-  AMPICILLIN: SIGNIFICANT CHANGE UP
-  AZTREONAM: SIGNIFICANT CHANGE UP
-  AZTREONAM: SIGNIFICANT CHANGE UP
-  CEFAZOLIN: SIGNIFICANT CHANGE UP
-  CEFAZOLIN: SIGNIFICANT CHANGE UP
-  CEFEPIME: SIGNIFICANT CHANGE UP
-  CEFEPIME: SIGNIFICANT CHANGE UP
-  CEFTRIAXONE: SIGNIFICANT CHANGE UP
-  CEFTRIAXONE: SIGNIFICANT CHANGE UP
-  CIPROFLOXACIN: SIGNIFICANT CHANGE UP
-  CIPROFLOXACIN: SIGNIFICANT CHANGE UP
-  ERTAPENEM: SIGNIFICANT CHANGE UP
-  ERTAPENEM: SIGNIFICANT CHANGE UP
-  GENTAMICIN: SIGNIFICANT CHANGE UP
-  GENTAMICIN: SIGNIFICANT CHANGE UP
-  IMIPENEM: SIGNIFICANT CHANGE UP
-  IMIPENEM: SIGNIFICANT CHANGE UP
-  LEVOFLOXACIN: SIGNIFICANT CHANGE UP
-  LEVOFLOXACIN: SIGNIFICANT CHANGE UP
-  MEROPENEM: SIGNIFICANT CHANGE UP
-  MEROPENEM: SIGNIFICANT CHANGE UP
-  PIPERACILLIN/TAZOBACTAM: SIGNIFICANT CHANGE UP
-  PIPERACILLIN/TAZOBACTAM: SIGNIFICANT CHANGE UP
-  TOBRAMYCIN: SIGNIFICANT CHANGE UP
-  TOBRAMYCIN: SIGNIFICANT CHANGE UP
-  TRIMETHOPRIM/SULFAMETHOXAZOLE: SIGNIFICANT CHANGE UP
-  TRIMETHOPRIM/SULFAMETHOXAZOLE: SIGNIFICANT CHANGE UP
ANION GAP SERPL CALC-SCNC: 8 MMOL/L — SIGNIFICANT CHANGE UP (ref 5–17)
BASOPHILS # BLD AUTO: 0.04 K/UL — SIGNIFICANT CHANGE UP (ref 0–0.2)
BASOPHILS NFR BLD AUTO: 0.3 % — SIGNIFICANT CHANGE UP (ref 0–2)
BUN SERPL-MCNC: 21 MG/DL — SIGNIFICANT CHANGE UP (ref 7–23)
CALCIUM SERPL-MCNC: 9.5 MG/DL — SIGNIFICANT CHANGE UP (ref 8.5–10.1)
CHLORIDE SERPL-SCNC: 98 MMOL/L — SIGNIFICANT CHANGE UP (ref 96–108)
CO2 SERPL-SCNC: 31 MMOL/L — SIGNIFICANT CHANGE UP (ref 22–31)
CREAT SERPL-MCNC: 0.98 MG/DL — SIGNIFICANT CHANGE UP (ref 0.5–1.3)
EGFR: 91 ML/MIN/1.73M2 — SIGNIFICANT CHANGE UP
EOSINOPHIL # BLD AUTO: 2.3 K/UL — HIGH (ref 0–0.5)
EOSINOPHIL NFR BLD AUTO: 19.6 % — HIGH (ref 0–6)
GLUCOSE BLDC GLUCOMTR-MCNC: 230 MG/DL — HIGH (ref 70–99)
GLUCOSE BLDC GLUCOMTR-MCNC: 257 MG/DL — HIGH (ref 70–99)
GLUCOSE BLDC GLUCOMTR-MCNC: 266 MG/DL — HIGH (ref 70–99)
GLUCOSE BLDC GLUCOMTR-MCNC: 329 MG/DL — HIGH (ref 70–99)
GLUCOSE SERPL-MCNC: 278 MG/DL — HIGH (ref 70–99)
HCT VFR BLD CALC: 38.9 % — LOW (ref 39–50)
HGB BLD-MCNC: 12.7 G/DL — LOW (ref 13–17)
IMM GRANULOCYTES NFR BLD AUTO: 0.5 % — SIGNIFICANT CHANGE UP (ref 0–0.9)
LYMPHOCYTES # BLD AUTO: 1.12 K/UL — SIGNIFICANT CHANGE UP (ref 1–3.3)
LYMPHOCYTES # BLD AUTO: 9.5 % — LOW (ref 13–44)
MCHC RBC-ENTMCNC: 30.6 PG — SIGNIFICANT CHANGE UP (ref 27–34)
MCHC RBC-ENTMCNC: 32.6 GM/DL — SIGNIFICANT CHANGE UP (ref 32–36)
MCV RBC AUTO: 93.7 FL — SIGNIFICANT CHANGE UP (ref 80–100)
METHOD TYPE: SIGNIFICANT CHANGE UP
METHOD TYPE: SIGNIFICANT CHANGE UP
MONOCYTES # BLD AUTO: 1.12 K/UL — HIGH (ref 0–0.9)
MONOCYTES NFR BLD AUTO: 9.5 % — SIGNIFICANT CHANGE UP (ref 2–14)
NEUTROPHILS # BLD AUTO: 7.12 K/UL — SIGNIFICANT CHANGE UP (ref 1.8–7.4)
NEUTROPHILS NFR BLD AUTO: 60.6 % — SIGNIFICANT CHANGE UP (ref 43–77)
NRBC # BLD: 0 /100 WBCS — SIGNIFICANT CHANGE UP (ref 0–0)
PLATELET # BLD AUTO: 273 K/UL — SIGNIFICANT CHANGE UP (ref 150–400)
POTASSIUM SERPL-MCNC: 3.6 MMOL/L — SIGNIFICANT CHANGE UP (ref 3.5–5.3)
POTASSIUM SERPL-SCNC: 3.6 MMOL/L — SIGNIFICANT CHANGE UP (ref 3.5–5.3)
RBC # BLD: 4.15 M/UL — LOW (ref 4.2–5.8)
RBC # FLD: 14.3 % — SIGNIFICANT CHANGE UP (ref 10.3–14.5)
SODIUM SERPL-SCNC: 137 MMOL/L — SIGNIFICANT CHANGE UP (ref 135–145)
SURGICAL PATHOLOGY STUDY: SIGNIFICANT CHANGE UP
VANCOMYCIN TROUGH SERPL-MCNC: 14 UG/ML — SIGNIFICANT CHANGE UP (ref 10–20)
WBC # BLD: 11.76 K/UL — HIGH (ref 3.8–10.5)
WBC # FLD AUTO: 11.76 K/UL — HIGH (ref 3.8–10.5)

## 2024-06-06 PROCEDURE — 99232 SBSQ HOSP IP/OBS MODERATE 35: CPT

## 2024-06-06 RX ORDER — INSULIN GLARGINE 100 [IU]/ML
10 INJECTION, SOLUTION SUBCUTANEOUS EVERY MORNING
Refills: 0 | Status: DISCONTINUED | OUTPATIENT
Start: 2024-06-07 | End: 2024-06-11

## 2024-06-06 RX ORDER — INSULIN GLARGINE 100 [IU]/ML
10 INJECTION, SOLUTION SUBCUTANEOUS ONCE
Refills: 0 | Status: COMPLETED | OUTPATIENT
Start: 2024-06-06 | End: 2024-06-06

## 2024-06-06 RX ADMIN — OXYCODONE HYDROCHLORIDE 10 MILLIGRAM(S): 5 TABLET ORAL at 07:05

## 2024-06-06 RX ADMIN — Medication 4: at 17:07

## 2024-06-06 RX ADMIN — Medication 20 MILLIGRAM(S): at 06:18

## 2024-06-06 RX ADMIN — Medication 10 MILLIEQUIVALENT(S): at 00:58

## 2024-06-06 RX ADMIN — Medication 5 MILLIGRAM(S): at 06:19

## 2024-06-06 RX ADMIN — GABAPENTIN 300 MILLIGRAM(S): 400 CAPSULE ORAL at 06:19

## 2024-06-06 RX ADMIN — APIXABAN 5 MILLIGRAM(S): 2.5 TABLET, FILM COATED ORAL at 17:10

## 2024-06-06 RX ADMIN — Medication 8: at 12:14

## 2024-06-06 RX ADMIN — Medication 500 MILLIGRAM(S): at 12:13

## 2024-06-06 RX ADMIN — CEFTRIAXONE 100 MILLIGRAM(S): 500 INJECTION, POWDER, FOR SOLUTION INTRAMUSCULAR; INTRAVENOUS at 10:38

## 2024-06-06 RX ADMIN — LORATADINE 10 MILLIGRAM(S): 10 TABLET ORAL at 12:13

## 2024-06-06 RX ADMIN — Medication 1 GRAM(S): at 17:09

## 2024-06-06 RX ADMIN — Medication 10 MILLIEQUIVALENT(S): at 12:13

## 2024-06-06 RX ADMIN — MORPHINE SULFATE 2 MILLIGRAM(S): 50 CAPSULE, EXTENDED RELEASE ORAL at 17:08

## 2024-06-06 RX ADMIN — Medication 81 MILLIGRAM(S): at 12:12

## 2024-06-06 RX ADMIN — Medication 250 MILLIGRAM(S): at 09:11

## 2024-06-06 RX ADMIN — Medication 250 MILLIGRAM(S): at 21:42

## 2024-06-06 RX ADMIN — POLYETHYLENE GLYCOL 3350 17 GRAM(S): 17 POWDER, FOR SOLUTION ORAL at 12:14

## 2024-06-06 RX ADMIN — PANTOPRAZOLE SODIUM 40 MILLIGRAM(S): 20 TABLET, DELAYED RELEASE ORAL at 06:18

## 2024-06-06 RX ADMIN — Medication 25 MILLIGRAM(S): at 09:51

## 2024-06-06 RX ADMIN — MORPHINE SULFATE 2 MILLIGRAM(S): 50 CAPSULE, EXTENDED RELEASE ORAL at 18:05

## 2024-06-06 RX ADMIN — GABAPENTIN 300 MILLIGRAM(S): 400 CAPSULE ORAL at 17:09

## 2024-06-06 RX ADMIN — MORPHINE SULFATE 2 MILLIGRAM(S): 50 CAPSULE, EXTENDED RELEASE ORAL at 09:51

## 2024-06-06 RX ADMIN — INSULIN GLARGINE 10 UNIT(S): 100 INJECTION, SOLUTION SUBCUTANEOUS at 10:37

## 2024-06-06 RX ADMIN — OXYCODONE HYDROCHLORIDE 10 MILLIGRAM(S): 5 TABLET ORAL at 19:45

## 2024-06-06 RX ADMIN — ATORVASTATIN CALCIUM 40 MILLIGRAM(S): 80 TABLET, FILM COATED ORAL at 21:42

## 2024-06-06 RX ADMIN — Medication 5 MILLIGRAM(S): at 21:42

## 2024-06-06 RX ADMIN — Medication 10 MILLIGRAM(S): at 17:10

## 2024-06-06 RX ADMIN — SENNA PLUS 2 TABLET(S): 8.6 TABLET ORAL at 21:42

## 2024-06-06 RX ADMIN — TIOTROPIUM BROMIDE 2 PUFF(S): 18 CAPSULE ORAL; RESPIRATORY (INHALATION) at 07:02

## 2024-06-06 RX ADMIN — Medication 10 MILLIEQUIVALENT(S): at 06:19

## 2024-06-06 RX ADMIN — Medication 1 TABLET(S): at 12:12

## 2024-06-06 RX ADMIN — Medication 325 MILLIGRAM(S): at 06:19

## 2024-06-06 RX ADMIN — MORPHINE SULFATE 2 MILLIGRAM(S): 50 CAPSULE, EXTENDED RELEASE ORAL at 10:50

## 2024-06-06 RX ADMIN — Medication 325 MILLIGRAM(S): at 17:09

## 2024-06-06 RX ADMIN — Medication 360 MILLIGRAM(S): at 17:09

## 2024-06-06 RX ADMIN — Medication 25 MILLIGRAM(S): at 03:23

## 2024-06-06 RX ADMIN — Medication 20 MILLIGRAM(S): at 14:02

## 2024-06-06 RX ADMIN — OXYCODONE HYDROCHLORIDE 10 MILLIGRAM(S): 5 TABLET ORAL at 18:53

## 2024-06-06 RX ADMIN — Medication 10 MILLIGRAM(S): at 06:18

## 2024-06-06 RX ADMIN — Medication 1 GRAM(S): at 06:19

## 2024-06-06 RX ADMIN — Medication 5 MILLIGRAM(S): at 21:43

## 2024-06-06 RX ADMIN — Medication 25 MILLIGRAM(S): at 17:08

## 2024-06-06 RX ADMIN — APIXABAN 5 MILLIGRAM(S): 2.5 TABLET, FILM COATED ORAL at 06:19

## 2024-06-06 RX ADMIN — Medication 2: at 21:52

## 2024-06-06 RX ADMIN — OXYCODONE HYDROCHLORIDE 10 MILLIGRAM(S): 5 TABLET ORAL at 06:19

## 2024-06-06 RX ADMIN — Medication 5 MILLIGRAM(S): at 14:02

## 2024-06-06 RX ADMIN — NALOXEGOL OXALATE 25 MILLIGRAM(S): 12.5 TABLET, FILM COATED ORAL at 12:14

## 2024-06-06 RX ADMIN — Medication 6: at 08:51

## 2024-06-06 RX ADMIN — Medication 100 MILLIGRAM(S): at 17:09

## 2024-06-06 RX ADMIN — Medication 10 MILLIEQUIVALENT(S): at 17:09

## 2024-06-06 RX ADMIN — Medication 1000 UNIT(S): at 12:13

## 2024-06-06 NOTE — PROGRESS NOTE ADULT - ASSESSMENT
53M w/ PMHx of Afib, CAD s/p stents, CVA, DM, HTN, pulmonary embolism on Eliquis,  osteomyelitis s/p debridement presenting  presents to emergency department from [facility] for R heel ulcer debridement.    Afib, CAD, stents, HTN, PE on ELiquis  - Known  Afib, initially RVR.    - S/p Dig load.  Rates have been controlled  - continue cardizem 360mg CD and  Toprol 100 mg daily   - Hold on further doses of Digoxin. Had pauses last admission, so would use caution.  No pauses noted while on tele.   - Afib rate controlled. dc tele  - BP stable and controlled   - continue Eliquis for AC    - 3/18/24: Technically difficult ECHO w/ normal LV size and function EF 50-55%, mildly reduced RV systolic function, mod dilated LA and RA, mod to sev MR, mild to mod TR, PASP 36  - Euvolemic on exam with no O2 requirement  - Continue torsemide po bid    - EKG showed A fib RVR, PVC @ 127.   - No evidence of any active ischemia   - Continue statin and ASA    - S/p surgical debridement of the right heel with bone biopsy, 6/4  - Tolerated procedure well, cardiac status optimal post operatively

## 2024-06-06 NOTE — SOCIAL WORK PROGRESS NOTE - NSSWPROGRESSNOTE_GEN_ALL_CORE
Pt discussed in pt care rounds today. Pt remains on iv abx and awaits wound culture results. Sw will continue to follow and pt will transition back to [facility] on discharge. Sw will remain available.

## 2024-06-06 NOTE — PROGRESS NOTE ADULT - SUBJECTIVE AND OBJECTIVE BOX
[facility] [place] [facility] -- Bolivar Carbajal,  Brittany, Reynaldo Sweeney Savella, Goodger, Cohen  Office # [phone]    Follow Up:   AFib, CAD, Cardiac Optimization    Subjective/Observations: Patient seen and examined. Patient awake, alert, resting in bed. +generalized rash/urticaria.   No complaints of chest pain, dyspnea, palpitations or dizziness.. No signs of orthopnea or PND. Denies any form of respiratory or cardiac discomfort.  Denies foot/heel pain    REVIEW OF SYSTEMS: All other review of systems is negative unless indicated above  PAST MEDICAL & SURGICAL HISTORY:  Diabetes      Diabetes mellitus with no complication      Afib      Hypertension      BPH (benign prostatic hyperplasia)      Perforated gastric ulcer  s/p emergent ex-lap omentopexy and plication 6/2019      Pulmonary embolism      History of non-ST elevation myocardial infarction (NSTEMI)      Osteomyelitis  s/p debridement      CAD S/P percutaneous coronary angioplasty      Cerebrovascular accident      H/O abdominal surgery      Perforated gastric ulcer      Traumatic amputation of left foot, initial encounter        MEDICATIONS  (STANDING):  apixaban 5 milliGRAM(s) Oral every 12 hours  ascorbic acid 500 milliGRAM(s) Oral daily  aspirin enteric coated 81 milliGRAM(s) Oral daily  atorvastatin 40 milliGRAM(s) Oral at bedtime  cefTRIAXone   IVPB 2000 milliGRAM(s) IV Intermittent every 24 hours  cefTRIAXone   IVPB      cholecalciferol 1000 Unit(s) Oral daily  dextrose 50% Injectable 12.5 Gram(s) IV Push once  dextrose 50% Injectable 25 Gram(s) IV Push once  dextrose Oral Gel 15 Gram(s) Oral once  diltiazem    milliGRAM(s) Oral daily  ferrous    sulfate 325 milliGRAM(s) Oral two times a day  gabapentin 300 milliGRAM(s) Oral every 12 hours  glucagon  Injectable 1 milliGRAM(s) IntraMuscular once  insulin lispro (ADMELOG) corrective regimen sliding scale   SubCutaneous three times a day before meals  insulin lispro (ADMELOG) corrective regimen sliding scale   SubCutaneous at bedtime  loratadine 10 milliGRAM(s) Oral daily  melatonin 5 milliGRAM(s) Oral at bedtime  metoclopramide 5 milliGRAM(s) Oral three times a day  metoprolol succinate  milliGRAM(s) Oral daily  multivitamin/minerals 1 Tablet(s) Oral daily  naloxegol 25 milliGRAM(s) Oral daily  oxybutynin 10 milliGRAM(s) Oral two times a day  oxyCODONE    IR 10 milliGRAM(s) Oral two times a day  pantoprazole    Tablet 40 milliGRAM(s) Oral before breakfast  polyethylene glycol 3350 17 Gram(s) Oral daily  potassium chloride    Tablet ER 10 milliEquivalent(s) Oral four times a day  senna 2 Tablet(s) Oral at bedtime  sucralfate 1 Gram(s) Oral two times a day  tiotropium 2.5 MICROgram(s) Inhaler 2 Puff(s) Inhalation daily  torsemide 20 milliGRAM(s) Oral two times a day  vancomycin  IVPB 1000 milliGRAM(s) IV Intermittent every 12 hours    MEDICATIONS  (PRN):  acetaminophen     Tablet .. 650 milliGRAM(s) Oral every 6 hours PRN Temp greater or equal to 38C (100.4F), Mild Pain (1 - 3)  aluminum hydroxide/magnesium hydroxide/simethicone Suspension 30 milliLiter(s) Oral every 4 hours PRN Dyspepsia  diphenhydrAMINE 25 milliGRAM(s) Oral every 6 hours PRN Rash and/or Itching  morphine  - Injectable 2 milliGRAM(s) IV Push every 6 hours PRN Severe Pain (7 - 10)  ondansetron Injectable 4 milliGRAM(s) IV Push every 8 hours PRN Nausea and/or Vomiting  sodium chloride 0.65% Nasal 1 Spray(s) Both Nostrils three times a day PRN Nasal Congestion    Allergies    No Known Drug Allergies  fish (Hives)    Intolerances      Vital Signs Last 24 Hrs  T(C): 36.8 (06 Jun 2024 06:41), Max: 36.8 (06 Jun 2024 04:56)  T(F): 98.2 (06 Jun 2024 06:41), Max: 98.2 (06 Jun 2024 04:56)  HR: 84 (06 Jun 2024 06:41) (78 - 102)  BP: 116/67 (06 Jun 2024 06:41) (110/71 - 127/74)  BP(mean): --  RR: 18 (06 Jun 2024 06:41) (18 - 19)  SpO2: 91% (06 Jun 2024 06:41) (90% - 94%)    Parameters below as of 06 Jun 2024 06:41  Patient On (Oxygen Delivery Method): room air      I&O's Summary    05 Jun 2024 07:01  -  06 Jun 2024 07:00  --------------------------------------------------------  IN: 100 mL / OUT: 3300 mL / NET: -3200 mL        TELE: AF  PHYSICAL EXAM:  Constitutional: NAD, awake and alert  HEENT: Moist Mucous Membranes, Anicteric  Pulmonary: Non-labored, breath sounds are clear bilaterally, No wheezing, rales or rhonchi  Cardiovascular: Regular, S1 and S2, + murmurs, No rubs, gallops or clicks  Gastrointestinal:  soft, nontender, nondistended   Lymph: Trace peripheral edema. No lymphadenopathy.   Skin: diffuse rash.  B/L foot DSD intact. Open blister BLE  Psych:  Mood & affect appropriate    LABS: All Labs Reviewed:                        12.6   11.30 )-----------( 296      ( 04 Jun 2024 09:05 )             37.9     04 Jun 2024 09:05    134    |  98     |  17     ----------------------------<  334    3.4     |  29     |  0.95     Ca    8.8        04 Jun 2024 09:05            12 Lead ECG:   Ventricular Rate 127 BPM    QRS Duration 82 ms    Q-T Interval 276 ms    QTC Calculation(Bazett) 401 ms    R Axis 13 degrees    T Axis 5 degrees    Diagnosis Line Atrial fibrillation with rapid ventricular response with premature ventricular or aberrantly conducted complexes  Low voltage QRS  Cannot rule out Anterior infarct , age undetermined  Abnormal ECG    Confirmed by darien France ([id number]) on 5/28/2024 2:53:37 PM (05-28-24 @ 11:53)       EXAM:  ECHO TTE WO CON COMP W DOPPLR         PROCEDURE DATE:  12/24/2019        INTERPRETATION:  INDICATION: Coronary artery disease    Blood Pressure 117/71    Height 187     Weight 93       BSA 2.2    Dimensions:    LA 4.2       Normal Values: 2.0 - 4.0 cm    Ao 4.0        Normal Values: 2.0 - 3.8 cm  SEPTUM 1.6       Normal Values: 0.6 - 1.2 cm  PWT 1.6       Normal Values: 0.6 - 1.1 cm  LVIDd 5.8         Normal Values: 3.0 - 5.6 cm  LVIDs 3.2         Normal Values: 1.8 - 4.0 cm    Derived Variables:  LVMI g/m2  RWT  Fractional Short  Ejection Fraction    Doppler Peak v. AoV= (m/sec)    OBSERVATIONS:    Mitral Valve: normal, mild MR.  Aortic Valve/Aorta: normal trileaflet aortic valve.  Tricuspid Valve: normal with trace TR.  Pulmonic Valve: normal  Left Atrium: Enlarged  Right Atrium: normal  Left Ventricle: Severe concentric LVH with normal systolic function, estimated LVEF of 65%.  Right Ventricle: normal size and systolic function.  Pericardium/Pleura: no significant pleural effusion, no significant pericardial effusion.  Pulmonary/RV Pressure: Inadequate TR jet to estimate PA systolic pressure  LV Diastolic Function: Grade 2diastolic dysfunction.    Severe concentric LVH, and mild LV enlargement, with normal systolic function, estimated LVEF of 65%. Normal right ventricular size and systolic function. Grade 2diastolic dysfunction. Left atrial enlargement The aortic root is normal in size. The mitral valve is structurally normal, mild MR. The aortic valve is trileaflet without stenosis or insufficiency. Trace physiologic TR. Inadequate TR jet to estimate PA systolic pressure No significant pericardial effusion.                  JOVANNI ALVAREZ M.D., ATTENDING CARDIOLOGIST  This document has been electronically signed. Dec 25 2019 11:45AM                  [facility] -- Bolivar Carbajal,  Brittany, Reynaldo Sweeney Savella, Goodger, Cohen  Office # [phone]    Follow Up:   AFib, CAD, Cardiac Optimization    Subjective/Observations: Patient seen and examined. Patient awake, alert, resting in bed. +generalized rash/urticaria.   No complaints of chest pain, dyspnea, palpitations or dizziness.. No signs of orthopnea or PND. Denies any form of respiratory or cardiac discomfort.  Denies foot/heel pain    REVIEW OF SYSTEMS: All other review of systems is negative unless indicated above  PAST MEDICAL & SURGICAL HISTORY:  Diabetes      Diabetes mellitus with no complication      Afib      Hypertension      BPH (benign prostatic hyperplasia)      Perforated gastric ulcer  s/p emergent ex-lap omentopexy and plication 6/2019      Pulmonary embolism      History of non-ST elevation myocardial infarction (NSTEMI)      Osteomyelitis  s/p debridement      CAD S/P percutaneous coronary angioplasty      Cerebrovascular accident      H/O abdominal surgery      Perforated gastric ulcer      Traumatic amputation of left foot, initial encounter        MEDICATIONS  (STANDING):  apixaban 5 milliGRAM(s) Oral every 12 hours  ascorbic acid 500 milliGRAM(s) Oral daily  aspirin enteric coated 81 milliGRAM(s) Oral daily  atorvastatin 40 milliGRAM(s) Oral at bedtime  cefTRIAXone   IVPB 2000 milliGRAM(s) IV Intermittent every 24 hours  cefTRIAXone   IVPB      cholecalciferol 1000 Unit(s) Oral daily  dextrose 50% Injectable 12.5 Gram(s) IV Push once  dextrose 50% Injectable 25 Gram(s) IV Push once  dextrose Oral Gel 15 Gram(s) Oral once  diltiazem    milliGRAM(s) Oral daily  ferrous    sulfate 325 milliGRAM(s) Oral two times a day  gabapentin 300 milliGRAM(s) Oral every 12 hours  glucagon  Injectable 1 milliGRAM(s) IntraMuscular once  insulin lispro (ADMELOG) corrective regimen sliding scale   SubCutaneous three times a day before meals  insulin lispro (ADMELOG) corrective regimen sliding scale   SubCutaneous at bedtime  loratadine 10 milliGRAM(s) Oral daily  melatonin 5 milliGRAM(s) Oral at bedtime  metoclopramide 5 milliGRAM(s) Oral three times a day  metoprolol succinate  milliGRAM(s) Oral daily  multivitamin/minerals 1 Tablet(s) Oral daily  naloxegol 25 milliGRAM(s) Oral daily  oxybutynin 10 milliGRAM(s) Oral two times a day  oxyCODONE    IR 10 milliGRAM(s) Oral two times a day  pantoprazole    Tablet 40 milliGRAM(s) Oral before breakfast  polyethylene glycol 3350 17 Gram(s) Oral daily  potassium chloride    Tablet ER 10 milliEquivalent(s) Oral four times a day  senna 2 Tablet(s) Oral at bedtime  sucralfate 1 Gram(s) Oral two times a day  tiotropium 2.5 MICROgram(s) Inhaler 2 Puff(s) Inhalation daily  torsemide 20 milliGRAM(s) Oral two times a day  vancomycin  IVPB 1000 milliGRAM(s) IV Intermittent every 12 hours    MEDICATIONS  (PRN):  acetaminophen     Tablet .. 650 milliGRAM(s) Oral every 6 hours PRN Temp greater or equal to 38C (100.4F), Mild Pain (1 - 3)  aluminum hydroxide/magnesium hydroxide/simethicone Suspension 30 milliLiter(s) Oral every 4 hours PRN Dyspepsia  diphenhydrAMINE 25 milliGRAM(s) Oral every 6 hours PRN Rash and/or Itching  morphine  - Injectable 2 milliGRAM(s) IV Push every 6 hours PRN Severe Pain (7 - 10)  ondansetron Injectable 4 milliGRAM(s) IV Push every 8 hours PRN Nausea and/or Vomiting  sodium chloride 0.65% Nasal 1 Spray(s) Both Nostrils three times a day PRN Nasal Congestion    Allergies    No Known Drug Allergies  fish (Hives)    Intolerances      Vital Signs Last 24 Hrs  T(C): 36.8 (06 Jun 2024 06:41), Max: 36.8 (06 Jun 2024 04:56)  T(F): 98.2 (06 Jun 2024 06:41), Max: 98.2 (06 Jun 2024 04:56)  HR: 84 (06 Jun 2024 06:41) (78 - 102)  BP: 116/67 (06 Jun 2024 06:41) (110/71 - 127/74)  BP(mean): --  RR: 18 (06 Jun 2024 06:41) (18 - 19)  SpO2: 91% (06 Jun 2024 06:41) (90% - 94%)    Parameters below as of 06 Jun 2024 06:41  Patient On (Oxygen Delivery Method): room air      I&O's Summary    05 Jun 2024 07:01  -  06 Jun 2024 07:00  --------------------------------------------------------  IN: 100 mL / OUT: 3300 mL / NET: -3200 mL        TELE: AF  PHYSICAL EXAM:  Constitutional: NAD, awake and alert  HEENT: Moist Mucous Membranes, Anicteric  Pulmonary: Non-labored, breath sounds are clear bilaterally, No wheezing, rales or rhonchi  Cardiovascular: Regular, S1 and S2, + murmurs, No rubs, gallops or clicks  Gastrointestinal:  soft, nontender, nondistended   Lymph: Trace peripheral edema. No lymphadenopathy.   Skin: diffuse rash.  B/L foot DSD intact. Open blister BLE  Psych:  Mood & affect appropriate    LABS: All Labs Reviewed:                        12.6   11.30 )-----------( 296      ( 04 Jun 2024 09:05 )             37.9     04 Jun 2024 09:05    134    |  98     |  17     ----------------------------<  334    3.4     |  29     |  0.95     Ca    8.8        04 Jun 2024 09:05            12 Lead ECG:   Ventricular Rate 127 BPM    QRS Duration 82 ms    Q-T Interval 276 ms    QTC Calculation(Bazett) 401 ms    R Axis 13 degrees    T Axis 5 degrees    Diagnosis Line Atrial fibrillation with rapid ventricular response with premature ventricular or aberrantly conducted complexes  Low voltage QRS  Cannot rule out Anterior infarct , age undetermined  Abnormal ECG    Confirmed by darien France ([id number]) on 5/28/2024 2:53:37 PM (05-28-24 @ 11:53)       EXAM:  ECHO TTE WO CON COMP W DOPPLR         PROCEDURE DATE:  12/24/2019        INTERPRETATION:  INDICATION: Coronary artery disease    Blood Pressure 117/71    Height 187     Weight 93       BSA 2.2    Dimensions:    LA 4.2       Normal Values: 2.0 - 4.0 cm    Ao 4.0        Normal Values: 2.0 - 3.8 cm  SEPTUM 1.6       Normal Values: 0.6 - 1.2 cm  PWT 1.6       Normal Values: 0.6 - 1.1 cm  LVIDd 5.8         Normal Values: 3.0 - 5.6 cm  LVIDs 3.2         Normal Values: 1.8 - 4.0 cm    Derived Variables:  LVMI g/m2  RWT  Fractional Short  Ejection Fraction    Doppler Peak v. AoV= (m/sec)    OBSERVATIONS:    Mitral Valve: normal, mild MR.  Aortic Valve/Aorta: normal trileaflet aortic valve.  Tricuspid Valve: normal with trace TR.  Pulmonic Valve: normal  Left Atrium: Enlarged  Right Atrium: normal  Left Ventricle: Severe concentric LVH with normal systolic function, estimated LVEF of 65%.  Right Ventricle: normal size and systolic function.  Pericardium/Pleura: no significant pleural effusion, no significant pericardial effusion.  Pulmonary/RV Pressure: Inadequate TR jet to estimate PA systolic pressure  LV Diastolic Function: Grade 2diastolic dysfunction.    Severe concentric LVH, and mild LV enlargement, with normal systolic function, estimated LVEF of 65%. Normal right ventricular size and systolic function. Grade 2diastolic dysfunction. Left atrial enlargement The aortic root is normal in size. The mitral valve is structurally normal, mild MR. The aortic valve is trileaflet without stenosis or insufficiency. Trace physiologic TR. Inadequate TR jet to estimate PA systolic pressure No significant pericardial effusion.                  JOVANNI ALVAREZ M.D., ATTENDING CARDIOLOGIST  This document has been electronically signed. Dec 25 2019 11:45AM

## 2024-06-06 NOTE — PROGRESS NOTE ADULT - SUBJECTIVE AND OBJECTIVE BOX
CAPILLARY BLOOD GLUCOSE      POCT Blood Glucose.: 266 mg/dL (06 Jun 2024 08:23)  POCT Blood Glucose.: 268 mg/dL (05 Jun 2024 21:20)  POCT Blood Glucose.: 250 mg/dL (05 Jun 2024 16:53)  POCT Blood Glucose.: 336 mg/dL (05 Jun 2024 11:59)      Vital Signs Last 24 Hrs  T(C): 36.8 (06 Jun 2024 06:41), Max: 36.8 (06 Jun 2024 04:56)  T(F): 98.2 (06 Jun 2024 06:41), Max: 98.2 (06 Jun 2024 04:56)  HR: 84 (06 Jun 2024 06:41) (78 - 102)  BP: 116/67 (06 Jun 2024 06:41) (110/71 - 127/74)  BP(mean): --  RR: 18 (06 Jun 2024 06:41) (18 - 19)  SpO2: 91% (06 Jun 2024 06:41) (90% - 94%)    Parameters below as of 06 Jun 2024 06:41  Patient On (Oxygen Delivery Method): room air        General: WN/WD NAD  Respiratory: CTA B/L  CV: RRR, S1S2, no murmurs, rubs or gallops  Abdominal: Soft, NT, ND +BS, Last BM  Extremities: No edema, + peripheral pulses             atorvastatin 40 milliGRAM(s) Oral at bedtime  dextrose 50% Injectable 12.5 Gram(s) IV Push once  dextrose 50% Injectable 25 Gram(s) IV Push once  dextrose Oral Gel 15 Gram(s) Oral once  glucagon  Injectable 1 milliGRAM(s) IntraMuscular once  insulin lispro (ADMELOG) corrective regimen sliding scale   SubCutaneous three times a day before meals  insulin lispro (ADMELOG) corrective regimen sliding scale   SubCutaneous at bedtime

## 2024-06-06 NOTE — PHARMACOTHERAPY INTERVENTION NOTE - COMMENTS
Vancomycin Dosing Per Pharmacy  Patient is a 53 year old male being treated for a right heel wound on IV vancomycin 1000mg Q12.     1. Indication for the vancomycin: right heel debridement   2. Requesting Provider: Dr. Anne-Marie Li   3. Current plan and dosing regimen: 1000mg Q12   4. Day of therapy: 3  5. Cultures: tissue culture 5/26/24: E faecalis (susceptible to vancomycin) and MRSA  6. Target trough or AUC/BAYLEE: 400-600  7. Day and time level is due: 06/06 1700   8. Previous levels: 06/04/24 (19:31): 22  9. Expected 24-hour AUC: 545

## 2024-06-06 NOTE — PROGRESS NOTE ADULT - SUBJECTIVE AND OBJECTIVE BOX
Date of Service 06-06-24 @ 14:20    Patient is a 53y old  Male who presents with a chief complaint of heel wound (06 Jun 2024 12:37)      INTERVAL /OVERNIGHT EVENTS:  c/o rash    MEDICATIONS  (STANDING):  apixaban 5 milliGRAM(s) Oral every 12 hours  ascorbic acid 500 milliGRAM(s) Oral daily  aspirin enteric coated 81 milliGRAM(s) Oral daily  atorvastatin 40 milliGRAM(s) Oral at bedtime  cefTRIAXone   IVPB 2000 milliGRAM(s) IV Intermittent every 24 hours  cefTRIAXone   IVPB      cholecalciferol 1000 Unit(s) Oral daily  dextrose 50% Injectable 12.5 Gram(s) IV Push once  dextrose 50% Injectable 25 Gram(s) IV Push once  dextrose Oral Gel 15 Gram(s) Oral once  diltiazem    milliGRAM(s) Oral daily  ferrous    sulfate 325 milliGRAM(s) Oral two times a day  gabapentin 300 milliGRAM(s) Oral every 12 hours  glucagon  Injectable 1 milliGRAM(s) IntraMuscular once  insulin lispro (ADMELOG) corrective regimen sliding scale   SubCutaneous three times a day before meals  insulin lispro (ADMELOG) corrective regimen sliding scale   SubCutaneous at bedtime  loratadine 10 milliGRAM(s) Oral daily  melatonin 5 milliGRAM(s) Oral at bedtime  metoclopramide 5 milliGRAM(s) Oral three times a day  metoprolol succinate  milliGRAM(s) Oral daily  multivitamin/minerals 1 Tablet(s) Oral daily  naloxegol 25 milliGRAM(s) Oral daily  oxybutynin 10 milliGRAM(s) Oral two times a day  oxyCODONE    IR 10 milliGRAM(s) Oral two times a day  pantoprazole    Tablet 40 milliGRAM(s) Oral before breakfast  polyethylene glycol 3350 17 Gram(s) Oral daily  potassium chloride    Tablet ER 10 milliEquivalent(s) Oral four times a day  senna 2 Tablet(s) Oral at bedtime  sucralfate 1 Gram(s) Oral two times a day  tiotropium 2.5 MICROgram(s) Inhaler 2 Puff(s) Inhalation daily  torsemide 20 milliGRAM(s) Oral two times a day  vancomycin  IVPB 1000 milliGRAM(s) IV Intermittent every 12 hours    MEDICATIONS  (PRN):  acetaminophen     Tablet .. 650 milliGRAM(s) Oral every 6 hours PRN Temp greater or equal to 38C (100.4F), Mild Pain (1 - 3)  aluminum hydroxide/magnesium hydroxide/simethicone Suspension 30 milliLiter(s) Oral every 4 hours PRN Dyspepsia  diphenhydrAMINE 25 milliGRAM(s) Oral every 6 hours PRN Rash and/or Itching  morphine  - Injectable 2 milliGRAM(s) IV Push every 6 hours PRN Severe Pain (7 - 10)  ondansetron Injectable 4 milliGRAM(s) IV Push every 8 hours PRN Nausea and/or Vomiting  sodium chloride 0.65% Nasal 1 Spray(s) Both Nostrils three times a day PRN Nasal Congestion      Allergies    No Known Drug Allergies  fish (Hives)    Intolerances        REVIEW OF SYSTEMS:  CONSTITUTIONAL: No fever, weight loss, or fatigue  EYES: No eye pain, visual disturbances, or discharge  ENMT:  No difficulty hearing, tinnitus, vertigo; No sinus or throat pain  NECK: No pain or stiffness  RESPIRATORY: No cough, wheezing, chills or hemoptysis; No shortness of breath  CARDIOVASCULAR: No chest pain, palpitations, dizziness, or leg swelling  GASTROINTESTINAL: No abdominal or epigastric pain. No nausea, vomiting, or hematemesis; No diarrhea or constipation. No melena or hematochezia.  GENITOURINARY: No dysuria, frequency, hematuria, or incontinence  NEUROLOGICAL: No headaches, memory loss, loss of strength, numbness, or tremors  SKIN: +, rashes  LYMPH NODES: No enlarged glands  ENDOCRINE: No heat or cold intolerance; No hair loss; No polydipsia or polyuria  MUSCULOSKELETAL: No joint pain or swelling; No muscle, back, or extremity pain  PSYCHIATRIC: No depression, anxiety, mood swings, or difficulty sleeping  HEME/LYMPH: No easy bruising, or bleeding gums  ALLERGY AND IMMUNOLOGIC: No hives or eczema    Vital Signs Last 24 Hrs  T(C): 36.9 (06 Jun 2024 12:04), Max: 36.9 (06 Jun 2024 12:04)  T(F): 98.5 (06 Jun 2024 12:04), Max: 98.5 (06 Jun 2024 12:04)  HR: 60 (06 Jun 2024 12:04) (60 - 102)  BP: 101/65 (06 Jun 2024 12:04) (101/65 - 127/74)  BP(mean): --  RR: 18 (06 Jun 2024 12:04) (18 - 19)  SpO2: 93% (06 Jun 2024 12:04) (90% - 94%)    Parameters below as of 06 Jun 2024 12:04  Patient On (Oxygen Delivery Method): room air        PHYSICAL EXAM:  GENERAL: NAD, well-groomed, well-developed  HEAD:  Atraumatic, Normocephalic  EYES: EOMI, PERRLA, conjunctiva and sclera clear  ENMT: No tonsillar erythema, exudates, or enlargement; Moist mucous membranes, Good dentition, No lesions  NECK: Supple, No JVD, Normal thyroid  NERVOUS SYSTEM:  Alert & Oriented X3, Good concentration; Motor Strength 5/5 B/L upper and lower extremities; DTRs 2+ intact and symmetric  CHEST/LUNG: Clear to auscultation bilaterally; No rales, rhonchi, wheezing, or rubs  HEART: Regular rate and rhythm; No murmurs, rubs, or gallops  ABDOMEN: Soft, Nontender, Nondistended; Bowel sounds present  EXTREMITIES:  2+ Peripheral Pulses, No clubbing, cyanosis, or edema  LYMPH: No lymphadenopathy noted  SKIN: + rashes     LABS:                        12.7   11.76 )-----------( 273      ( 06 Jun 2024 07:30 )             38.9     06 Jun 2024 07:30    137    |  98     |  21     ----------------------------<  278    3.6     |  31     |  0.98     Ca    9.5        06 Jun 2024 07:30        Urinalysis Basic - ( 06 Jun 2024 07:30 )    Color: x / Appearance: x / SG: x / pH: x  Gluc: 278 mg/dL / Ketone: x  / Bili: x / Urobili: x   Blood: x / Protein: x / Nitrite: x   Leuk Esterase: x / RBC: x / WBC x   Sq Epi: x / Non Sq Epi: x / Bacteria: x      CAPILLARY BLOOD GLUCOSE      POCT Blood Glucose.: 329 mg/dL (06 Jun 2024 11:50)  POCT Blood Glucose.: 266 mg/dL (06 Jun 2024 08:23)  POCT Blood Glucose.: 268 mg/dL (05 Jun 2024 21:20)  POCT Blood Glucose.: 250 mg/dL (05 Jun 2024 16:53)      RADIOLOGY & ADDITIONAL TESTS:    Notes Reviewed:  [x ] YES  [ ] NO    Care Discussed with Consultants/Other Providers [x ] YES  [ ] NO

## 2024-06-06 NOTE — PROGRESS NOTE ADULT - ASSESSMENT
Pt is a 53M w/ PMHx of Afib, CAD, DM presenting for R heel ulcer debridement.   S/p zosyn x7 day course at facility  admitted for debridement  Prior WCx MRSA/E. faecalis from 3/26/24  6/3 Now w/ uptrending WBC and drainage from wound   6/4 S/p Selective debridement of wound 04-Jun-2024 10:52:57  Parviz Todd    6/3 WCx   Numerous Staphylococcus aureus  6/4  TCx E coli, MRSA  Right calcaneus bone pathology:  -   Fragments of bone with chronic osteomyelitis.  -   Focal acute osteomyelitis cannot be ruled out.    Recommendations:   C/w vancomycin, goal trough 15-20, dosing per pharmacy protocol  ceftriaxone 2gm q24h   recs to follow with E coli sens  Appreciate podiatry/wound care    Thank you for consulting us and involving us in the management of this most interesting and challenging case.  We will follow along in the care of this patient. Please call us at [phone] or text me directly on my cell# at [phone] with any concerns.

## 2024-06-07 ENCOUNTER — TRANSCRIPTION ENCOUNTER (OUTPATIENT)
Age: 54
End: 2024-06-07

## 2024-06-07 DIAGNOSIS — M86.10 OTHER ACUTE OSTEOMYELITIS, UNSPECIFIED SITE: ICD-10-CM

## 2024-06-07 LAB
-  AMPICILLIN: SIGNIFICANT CHANGE UP
-  VANCOMYCIN: SIGNIFICANT CHANGE UP
CULTURE RESULTS: ABNORMAL
GLUCOSE BLDC GLUCOMTR-MCNC: 210 MG/DL — HIGH (ref 70–99)
GLUCOSE BLDC GLUCOMTR-MCNC: 212 MG/DL — HIGH (ref 70–99)
GLUCOSE BLDC GLUCOMTR-MCNC: 276 MG/DL — HIGH (ref 70–99)
GLUCOSE BLDC GLUCOMTR-MCNC: 310 MG/DL — HIGH (ref 70–99)
HCT VFR BLD CALC: 39.3 % — SIGNIFICANT CHANGE UP (ref 39–50)
HGB BLD-MCNC: 12.9 G/DL — LOW (ref 13–17)
MCHC RBC-ENTMCNC: 30.9 PG — SIGNIFICANT CHANGE UP (ref 27–34)
MCHC RBC-ENTMCNC: 32.8 GM/DL — SIGNIFICANT CHANGE UP (ref 32–36)
MCV RBC AUTO: 94 FL — SIGNIFICANT CHANGE UP (ref 80–100)
METHOD TYPE: SIGNIFICANT CHANGE UP
NRBC # BLD: 0 /100 WBCS — SIGNIFICANT CHANGE UP (ref 0–0)
ORGANISM # SPEC MICROSCOPIC CNT: ABNORMAL
ORGANISM # SPEC MICROSCOPIC CNT: ABNORMAL
ORGANISM # SPEC MICROSCOPIC CNT: SIGNIFICANT CHANGE UP
PLATELET # BLD AUTO: 289 K/UL — SIGNIFICANT CHANGE UP (ref 150–400)
RBC # BLD: 4.18 M/UL — LOW (ref 4.2–5.8)
RBC # FLD: 14.4 % — SIGNIFICANT CHANGE UP (ref 10.3–14.5)
SPECIMEN SOURCE: SIGNIFICANT CHANGE UP
WBC # BLD: 12.07 K/UL — HIGH (ref 3.8–10.5)
WBC # FLD AUTO: 12.07 K/UL — HIGH (ref 3.8–10.5)

## 2024-06-07 PROCEDURE — 36573 INSJ PICC RS&I 5 YR+: CPT

## 2024-06-07 PROCEDURE — 99232 SBSQ HOSP IP/OBS MODERATE 35: CPT

## 2024-06-07 RX ORDER — POTASSIUM CHLORIDE 20 MEQ
20 PACKET (EA) ORAL THREE TIMES A DAY
Refills: 0 | Status: DISCONTINUED | OUTPATIENT
Start: 2024-06-07 | End: 2024-06-13

## 2024-06-07 RX ORDER — SODIUM CHLORIDE 9 MG/ML
10 INJECTION INTRAMUSCULAR; INTRAVENOUS; SUBCUTANEOUS
Refills: 0 | Status: DISCONTINUED | OUTPATIENT
Start: 2024-06-07 | End: 2024-06-13

## 2024-06-07 RX ORDER — METOPROLOL TARTRATE 50 MG
1 TABLET ORAL
Qty: 0 | Refills: 0 | DISCHARGE
Start: 2024-06-07

## 2024-06-07 RX ORDER — VANCOMYCIN HCL 1 G
1 VIAL (EA) INTRAVENOUS
Qty: 0 | Refills: 0 | DISCHARGE
Start: 2024-06-07

## 2024-06-07 RX ORDER — DIPHENHYDRAMINE HCL 50 MG
25 CAPSULE ORAL EVERY 4 HOURS
Refills: 0 | Status: DISCONTINUED | OUTPATIENT
Start: 2024-06-07 | End: 2024-06-13

## 2024-06-07 RX ORDER — CHLORHEXIDINE GLUCONATE 213 G/1000ML
1 SOLUTION TOPICAL
Refills: 0 | Status: DISCONTINUED | OUTPATIENT
Start: 2024-06-07 | End: 2024-06-13

## 2024-06-07 RX ORDER — ERTAPENEM SODIUM 1 G/1
1 INJECTION, POWDER, LYOPHILIZED, FOR SOLUTION INTRAMUSCULAR; INTRAVENOUS
Qty: 0 | Refills: 0 | DISCHARGE
Start: 2024-06-07

## 2024-06-07 RX ORDER — DIPHENHYDRAMINE HCL 50 MG
1 CAPSULE ORAL
Qty: 0 | Refills: 0 | DISCHARGE
Start: 2024-06-07

## 2024-06-07 RX ORDER — ERTAPENEM SODIUM 1 G/1
1000 INJECTION, POWDER, LYOPHILIZED, FOR SOLUTION INTRAMUSCULAR; INTRAVENOUS EVERY 24 HOURS
Refills: 0 | Status: DISCONTINUED | OUTPATIENT
Start: 2024-06-07 | End: 2024-06-10

## 2024-06-07 RX ORDER — ACETAMINOPHEN 500 MG
2 TABLET ORAL
Qty: 0 | Refills: 0 | DISCHARGE
Start: 2024-06-07

## 2024-06-07 RX ADMIN — APIXABAN 5 MILLIGRAM(S): 2.5 TABLET, FILM COATED ORAL at 17:08

## 2024-06-07 RX ADMIN — Medication 325 MILLIGRAM(S): at 05:37

## 2024-06-07 RX ADMIN — Medication 10 MILLIEQUIVALENT(S): at 05:35

## 2024-06-07 RX ADMIN — Medication 8: at 12:39

## 2024-06-07 RX ADMIN — MORPHINE SULFATE 2 MILLIGRAM(S): 50 CAPSULE, EXTENDED RELEASE ORAL at 14:56

## 2024-06-07 RX ADMIN — GABAPENTIN 300 MILLIGRAM(S): 400 CAPSULE ORAL at 17:07

## 2024-06-07 RX ADMIN — Medication 20 MILLIGRAM(S): at 14:56

## 2024-06-07 RX ADMIN — MORPHINE SULFATE 2 MILLIGRAM(S): 50 CAPSULE, EXTENDED RELEASE ORAL at 00:44

## 2024-06-07 RX ADMIN — LORATADINE 10 MILLIGRAM(S): 10 TABLET ORAL at 12:40

## 2024-06-07 RX ADMIN — Medication 25 MILLIGRAM(S): at 21:27

## 2024-06-07 RX ADMIN — Medication 10 MILLIGRAM(S): at 05:36

## 2024-06-07 RX ADMIN — NALOXEGOL OXALATE 25 MILLIGRAM(S): 12.5 TABLET, FILM COATED ORAL at 12:42

## 2024-06-07 RX ADMIN — Medication 250 MILLIGRAM(S): at 21:27

## 2024-06-07 RX ADMIN — Medication 20 MILLIEQUIVALENT(S): at 21:27

## 2024-06-07 RX ADMIN — ATORVASTATIN CALCIUM 40 MILLIGRAM(S): 80 TABLET, FILM COATED ORAL at 21:27

## 2024-06-07 RX ADMIN — Medication 325 MILLIGRAM(S): at 17:08

## 2024-06-07 RX ADMIN — Medication 20 MILLIEQUIVALENT(S): at 14:57

## 2024-06-07 RX ADMIN — Medication 5 MILLIGRAM(S): at 14:56

## 2024-06-07 RX ADMIN — Medication 1 GRAM(S): at 05:36

## 2024-06-07 RX ADMIN — OXYCODONE HYDROCHLORIDE 10 MILLIGRAM(S): 5 TABLET ORAL at 05:36

## 2024-06-07 RX ADMIN — Medication 1 GRAM(S): at 17:08

## 2024-06-07 RX ADMIN — Medication 360 MILLIGRAM(S): at 05:35

## 2024-06-07 RX ADMIN — Medication 1000 UNIT(S): at 12:42

## 2024-06-07 RX ADMIN — Medication 25 MILLIGRAM(S): at 00:43

## 2024-06-07 RX ADMIN — OXYCODONE HYDROCHLORIDE 10 MILLIGRAM(S): 5 TABLET ORAL at 06:09

## 2024-06-07 RX ADMIN — MORPHINE SULFATE 2 MILLIGRAM(S): 50 CAPSULE, EXTENDED RELEASE ORAL at 09:37

## 2024-06-07 RX ADMIN — Medication 5 MILLIGRAM(S): at 21:27

## 2024-06-07 RX ADMIN — Medication 81 MILLIGRAM(S): at 12:43

## 2024-06-07 RX ADMIN — MORPHINE SULFATE 2 MILLIGRAM(S): 50 CAPSULE, EXTENDED RELEASE ORAL at 08:37

## 2024-06-07 RX ADMIN — CEFTRIAXONE 100 MILLIGRAM(S): 500 INJECTION, POWDER, FOR SOLUTION INTRAMUSCULAR; INTRAVENOUS at 09:55

## 2024-06-07 RX ADMIN — ONDANSETRON 4 MILLIGRAM(S): 8 TABLET, FILM COATED ORAL at 00:51

## 2024-06-07 RX ADMIN — GABAPENTIN 300 MILLIGRAM(S): 400 CAPSULE ORAL at 05:35

## 2024-06-07 RX ADMIN — Medication 25 MILLIGRAM(S): at 08:37

## 2024-06-07 RX ADMIN — Medication 10 MILLIGRAM(S): at 17:07

## 2024-06-07 RX ADMIN — TIOTROPIUM BROMIDE 2 PUFF(S): 18 CAPSULE ORAL; RESPIRATORY (INHALATION) at 05:41

## 2024-06-07 RX ADMIN — Medication 250 MILLIGRAM(S): at 08:34

## 2024-06-07 RX ADMIN — Medication 1 TABLET(S): at 12:41

## 2024-06-07 RX ADMIN — MORPHINE SULFATE 2 MILLIGRAM(S): 50 CAPSULE, EXTENDED RELEASE ORAL at 01:44

## 2024-06-07 RX ADMIN — MORPHINE SULFATE 2 MILLIGRAM(S): 50 CAPSULE, EXTENDED RELEASE ORAL at 21:26

## 2024-06-07 RX ADMIN — Medication 500 MILLIGRAM(S): at 12:42

## 2024-06-07 RX ADMIN — PANTOPRAZOLE SODIUM 40 MILLIGRAM(S): 20 TABLET, DELAYED RELEASE ORAL at 05:35

## 2024-06-07 RX ADMIN — INSULIN GLARGINE 10 UNIT(S): 100 INJECTION, SOLUTION SUBCUTANEOUS at 08:34

## 2024-06-07 RX ADMIN — Medication 4: at 17:06

## 2024-06-07 RX ADMIN — MORPHINE SULFATE 2 MILLIGRAM(S): 50 CAPSULE, EXTENDED RELEASE ORAL at 21:41

## 2024-06-07 RX ADMIN — Medication 10 MILLIEQUIVALENT(S): at 00:26

## 2024-06-07 RX ADMIN — Medication 100 MILLIGRAM(S): at 05:35

## 2024-06-07 RX ADMIN — APIXABAN 5 MILLIGRAM(S): 2.5 TABLET, FILM COATED ORAL at 05:36

## 2024-06-07 RX ADMIN — Medication 5 MILLIGRAM(S): at 05:36

## 2024-06-07 RX ADMIN — Medication 20 MILLIGRAM(S): at 05:35

## 2024-06-07 RX ADMIN — Medication 6: at 08:32

## 2024-06-07 RX ADMIN — SENNA PLUS 2 TABLET(S): 8.6 TABLET ORAL at 21:27

## 2024-06-07 RX ADMIN — OXYCODONE HYDROCHLORIDE 10 MILLIGRAM(S): 5 TABLET ORAL at 17:07

## 2024-06-07 RX ADMIN — ERTAPENEM SODIUM 120 MILLIGRAM(S): 1 INJECTION, POWDER, LYOPHILIZED, FOR SOLUTION INTRAMUSCULAR; INTRAVENOUS at 14:55

## 2024-06-07 RX ADMIN — Medication 25 MILLIGRAM(S): at 14:56

## 2024-06-07 NOTE — SOCIAL WORK PROGRESS NOTE - NSSWPROGRESSNOTE_GEN_ALL_CORE
As per MD no WEEKEND DC. Pt discussed in pt care rounds today. Pt is s/p another debridement. [employer] called they will forward clinicals to [facility]  [phone] Ext [phone]. Coretta has contacted [facility] today and a message was left. Coretta will continue to follow

## 2024-06-07 NOTE — DISCHARGE NOTE PROVIDER - NSDCMRMEDTOKEN_GEN_ALL_CORE_FT
acetaminophen 325 mg oral tablet: 2 tab(s) orally every 6 hours As needed Temp greater or equal to 38C (100.4F), Mild Pain (1 - 3)  Allegra 60 mg oral tablet: 1 tab(s) orally once a day  apixaban 5 mg oral tablet: 1 tab(s) orally 2 times a day  ascorbic acid 1000 mg oral tablet: 1 tab(s) orally once a day  aspirin 81 mg oral tablet, chewable: 1 tab(s) chewed once a day  atorvastatin 40 mg oral tablet: 1 tab(s) orally once a day  cholecalciferol 1250 mcg (50,000 intl units) oral capsule: 1 cap(s) orally once a month on 15th  cranberry oral capsule: 450 milligram(s) orally once a day  Deep Sea Nasal 0.65% nasal spray: 2 spray(s) in each nostril once a day  DilTIAZem (Eqv-Cardizem CD) 360 mg/24 hours oral capsule, extended release: 1 cap(s) orally once a day  diphenhydrAMINE 25 mg oral capsule: 1 cap(s) orally every 4 hours As needed Rash and/or Itching  docusate sodium 100 mg oral capsule: 3 cap(s) orally once a day  ertapenem 1 g injection: 1 gram(s) injectable once a day  famotidine 20 mg oral tablet: 1 tab(s) orally once a day  ferrous sulfate 325 mg (65 mg elemental iron) oral tablet: 1 tab(s) orally 2 times a day  gabapentin 300 mg oral capsule: 1 cap(s) orally every 12 hours  HumuLIN R 100 units/mL injectable solution: sliding scale  insulin glargine 100 units/mL subcutaneous solution: 15 unit(s) subcutaneous once a day 09:00am  insulin glargine 100 units/mL subcutaneous solution: 7 unit(s) subcutaneous once a day (at bedtime) 21:00pm  levocetirizine 5 mg oral tablet: 1 tab(s) orally once a day (at bedtime)  melatonin 5 mg oral tablet: 1 tab(s) orally once a day (at bedtime)  metFORMIN 1000 mg oral tablet: 1 tab(s) orally 2 times a day  metoclopramide 5 mg oral tablet: 1 tab(s) orally 3 times a day  metoprolol succinate 100 mg oral tablet, extended release: 1 tab(s) orally once a day  Multiple Vitamins with Minerals oral tablet: 1 tab(s) orally once a day  naloxegol 25 mg oral tablet: 1 tab(s) orally once a day  ondansetron 2 mg/mL injectable solution: 4 milligram(s) intravenously every 8 hours as needed for  nausea  oxyBUTYnin 10 mg/24 hr oral tablet, extended release: 1 tab(s) orally once a day  oxyCODONE 10 mg oral tablet: 1 tab(s) orally 2 times a day  pantoprazole 40 mg oral delayed release tablet: 1 tab(s) orally once a day  polyethylene glycol 3350 oral powder for reconstitution: 17 gram(s) orally 2 times a day  Potassium Chloride (Eqv-Klor-Con M20) 20 mEq oral tablet, extended release: 1 tab(s) orally 2 times a day  Pyridium 100 mg oral tablet: 1 tab(s) orally 3 times a day as needed  senna (sennosides) 8.6 mg oral tablet: 2 tab(s) orally once a day (at bedtime)  sucralfate 1 g oral tablet: 1 tab(s) orally once a day  tiotropium 2.5 mcg/inh inhalation aerosol: 2 puff(s) inhaled once a day  torsemide 20 mg oral tablet: 1 tab(s) orally 2 times a day  triamcinolone 0.025% topical cream: Apply topically to affected area 2 times a day  vancomycin 1 g intravenous injection: 1 gram(s) intravenous every 12 hours   acetaminophen 325 mg oral tablet: 2 tab(s) orally every 6 hours As needed Temp greater or equal to 38C (100.4F), Mild Pain (1 - 3)  Allegra 60 mg oral tablet: 1 tab(s) orally once a day  apixaban 5 mg oral tablet: 1 tab(s) orally 2 times a day  ascorbic acid 1000 mg oral tablet: 1 tab(s) orally once a day  aspirin 81 mg oral tablet, chewable: 1 tab(s) chewed once a day  atorvastatin 40 mg oral tablet: 1 tab(s) orally once a day  cholecalciferol 1250 mcg (50,000 intl units) oral capsule: 1 cap(s) orally once a month on 15th  cranberry oral capsule: 450 milligram(s) orally once a day  Deep Sea Nasal 0.65% nasal spray: 2 spray(s) in each nostril once a day  DilTIAZem (Eqv-Cardizem CD) 360 mg/24 hours oral capsule, extended release: 1 cap(s) orally once a day  diphenhydrAMINE 25 mg oral capsule: 1 cap(s) orally every 4 hours As needed Rash and/or Itching  docusate sodium 100 mg oral capsule: 3 cap(s) orally once a day  ertapenem 1 g injection: 1 gram(s) injectable once a day  famotidine 20 mg oral tablet: 1 tab(s) orally once a day  ferrous sulfate 325 mg (65 mg elemental iron) oral tablet: 1 tab(s) orally 2 times a day  gabapentin 300 mg oral capsule: 1 cap(s) orally every 12 hours  HumuLIN R 100 units/mL injectable solution: sliding scale  insulin glargine 100 units/mL subcutaneous solution: 15 unit(s) subcutaneous once a day 09:00am  insulin glargine 100 units/mL subcutaneous solution: 7 unit(s) subcutaneous once a day (at bedtime) 21:00pm  levocetirizine 5 mg oral tablet: 1 tab(s) orally once a day (at bedtime)  Medrol Dosepak 4 mg oral tablet: 1 packet(s) orally once take as directed on packet  melatonin 5 mg oral tablet: 1 tab(s) orally once a day (at bedtime)  metFORMIN 1000 mg oral tablet: 1 tab(s) orally 2 times a day  metoclopramide 5 mg oral tablet: 1 tab(s) orally 3 times a day  metoprolol succinate 100 mg oral tablet, extended release: 1 tab(s) orally once a day  Multiple Vitamins with Minerals oral tablet: 1 tab(s) orally once a day  naloxegol 25 mg oral tablet: 1 tab(s) orally once a day  ondansetron 2 mg/mL injectable solution: 4 milligram(s) intravenously every 8 hours as needed for  nausea  oxyBUTYnin 10 mg/24 hr oral tablet, extended release: 1 tab(s) orally once a day  oxyCODONE 10 mg oral tablet: 1 tab(s) orally 2 times a day  pantoprazole 40 mg oral delayed release tablet: 1 tab(s) orally once a day  polyethylene glycol 3350 oral powder for reconstitution: 17 gram(s) orally 2 times a day  Potassium Chloride (Eqv-Klor-Con M20) 20 mEq oral tablet, extended release: 1 tab(s) orally 2 times a day  Pyridium 100 mg oral tablet: 1 tab(s) orally 3 times a day as needed  senna (sennosides) 8.6 mg oral tablet: 2 tab(s) orally once a day (at bedtime)  sucralfate 1 g oral tablet: 1 tab(s) orally once a day  tiotropium 2.5 mcg/inh inhalation aerosol: 2 puff(s) inhaled once a day  torsemide 20 mg oral tablet: 1 tab(s) orally 2 times a day  triamcinolone 0.025% topical cream: Apply topically to affected area 2 times a day  vancomycin 750 mg intravenous injection: 750 milligram(s) intravenous every 12 hours

## 2024-06-07 NOTE — PROGRESS NOTE ADULT - SUBJECTIVE AND OBJECTIVE BOX
[facility] -- Bolivar Carbajal,  Brittany, Reynaldo Sweeney Savella, Goodger, Cohen  Office # [phone]    Follow Up:   AFib, CAD, Cardiac Optimization    Subjective/Observations: Patient seen and examined. Patient awake, alert, resting in bed. +generalized rash/urticaria.   No complaints of chest pain, dyspnea, palpitations or dizziness.. No signs of orthopnea or PND. Denies any form of respiratory or cardiac discomfort.  Denies foot/heel pain    REVIEW OF SYSTEMS: All other review of systems is negative unless indicated above  PAST MEDICAL & SURGICAL HISTORY:  Diabetes      Diabetes mellitus with no complication      Afib      Hypertension      BPH (benign prostatic hyperplasia)      Perforated gastric ulcer  s/p emergent ex-lap omentopexy and plication 6/2019      Pulmonary embolism      History of non-ST elevation myocardial infarction (NSTEMI)      Osteomyelitis  s/p debridement      CAD S/P percutaneous coronary angioplasty      Cerebrovascular accident      H/O abdominal surgery      Perforated gastric ulcer      Traumatic amputation of left foot, initial encounter        MEDICATIONS  (STANDING):  apixaban 5 milliGRAM(s) Oral every 12 hours  ascorbic acid 500 milliGRAM(s) Oral daily  aspirin enteric coated 81 milliGRAM(s) Oral daily  atorvastatin 40 milliGRAM(s) Oral at bedtime  cefTRIAXone   IVPB      cefTRIAXone   IVPB 2000 milliGRAM(s) IV Intermittent every 24 hours  cholecalciferol 1000 Unit(s) Oral daily  dextrose 50% Injectable 12.5 Gram(s) IV Push once  dextrose 50% Injectable 25 Gram(s) IV Push once  dextrose Oral Gel 15 Gram(s) Oral once  diltiazem    milliGRAM(s) Oral daily  ferrous    sulfate 325 milliGRAM(s) Oral two times a day  gabapentin 300 milliGRAM(s) Oral every 12 hours  glucagon  Injectable 1 milliGRAM(s) IntraMuscular once  insulin glargine Injectable (LANTUS) 10 Unit(s) SubCutaneous every morning  insulin lispro (ADMELOG) corrective regimen sliding scale   SubCutaneous three times a day before meals  insulin lispro (ADMELOG) corrective regimen sliding scale   SubCutaneous at bedtime  loratadine 10 milliGRAM(s) Oral daily  melatonin 5 milliGRAM(s) Oral at bedtime  metoclopramide 5 milliGRAM(s) Oral three times a day  metoprolol succinate  milliGRAM(s) Oral daily  multivitamin/minerals 1 Tablet(s) Oral daily  naloxegol 25 milliGRAM(s) Oral daily  oxybutynin 10 milliGRAM(s) Oral two times a day  oxyCODONE    IR 10 milliGRAM(s) Oral two times a day  pantoprazole    Tablet 40 milliGRAM(s) Oral before breakfast  polyethylene glycol 3350 17 Gram(s) Oral daily  potassium chloride    Tablet ER 10 milliEquivalent(s) Oral four times a day  senna 2 Tablet(s) Oral at bedtime  sucralfate 1 Gram(s) Oral two times a day  tiotropium 2.5 MICROgram(s) Inhaler 2 Puff(s) Inhalation daily  torsemide 20 milliGRAM(s) Oral two times a day  vancomycin  IVPB 1000 milliGRAM(s) IV Intermittent every 12 hours    MEDICATIONS  (PRN):  acetaminophen     Tablet .. 650 milliGRAM(s) Oral every 6 hours PRN Temp greater or equal to 38C (100.4F), Mild Pain (1 - 3)  aluminum hydroxide/magnesium hydroxide/simethicone Suspension 30 milliLiter(s) Oral every 4 hours PRN Dyspepsia  diphenhydrAMINE 25 milliGRAM(s) Oral every 6 hours PRN Rash and/or Itching  morphine  - Injectable 2 milliGRAM(s) IV Push every 6 hours PRN Severe Pain (7 - 10)  ondansetron Injectable 4 milliGRAM(s) IV Push every 8 hours PRN Nausea and/or Vomiting  sodium chloride 0.65% Nasal 1 Spray(s) Both Nostrils three times a day PRN Nasal Congestion    Allergies    No Known Drug Allergies  fish (Hives)    Intolerances      Vital Signs Last 24 Hrs  T(C): 36.8 (07 Jun 2024 05:49), Max: 36.9 (06 Jun 2024 12:04)  T(F): 98.3 (07 Jun 2024 05:49), Max: 98.5 (06 Jun 2024 12:04)  HR: 98 (07 Jun 2024 05:49) (58 - 98)  BP: 122/72 (07 Jun 2024 05:49) (101/65 - 147/75)  BP(mean): --  RR: 17 (07 Jun 2024 05:49) (17 - 18)  SpO2: 92% (07 Jun 2024 05:49) (92% - 93%)    Parameters below as of 07 Jun 2024 05:49  Patient On (Oxygen Delivery Method): room air      I&O's Summary    06 Jun 2024 07:01  -  07 Jun 2024 07:00  --------------------------------------------------------  IN: 0 mL / OUT: 2650 mL / NET: -2650 mL        TELE: off tele   PHYSICAL EXAM:  Constitutional: NAD, awake and alert  HEENT: Moist Mucous Membranes, Anicteric  Pulmonary: Non-labored, breath sounds are clear bilaterally, No wheezing, rales or rhonchi  Cardiovascular: Regular, S1 and S2, + murmurs, No rubs, gallops or clicks  Gastrointestinal:  soft, nontender, nondistended   Lymph: Trace peripheral edema. No lymphadenopathy.   Skin: diffuse rash.  B/L foot DSD intact. Open blister BLE  Psych:  Mood & affect appropriate    LABS: All Labs Reviewed:                        12.7   11.76 )-----------( 273      ( 06 Jun 2024 07:30 )             38.9     06 Jun 2024 07:30    137    |  98     |  21     ----------------------------<  278    3.6     |  31     |  0.98     Ca    9.5        06 Jun 2024 07:30            12 Lead ECG:   Ventricular Rate 127 BPM    QRS Duration 82 ms    Q-T Interval 276 ms    QTC Calculation(Bazett) 401 ms    R Axis 13 degrees    T Axis 5 degrees    Diagnosis Line Atrial fibrillation with rapid ventricular response with premature ventricular or aberrantly conducted complexes  Low voltage QRS  Cannot rule out Anterior infarct , age undetermined  Abnormal ECG    Confirmed by darien France ([id number]) on 5/28/2024 2:53:37 PM (05-28-24 @ 11:53)       EXAM:  ECHO TTE WO CON COMP W DOPPLR         PROCEDURE DATE:  12/24/2019        INTERPRETATION:  INDICATION: Coronary artery disease    Blood Pressure 117/71    Height 187     Weight 93       BSA 2.2    Dimensions:    LA 4.2       Normal Values: 2.0 - 4.0 cm    Ao 4.0        Normal Values: 2.0 - 3.8 cm  SEPTUM 1.6       Normal Values: 0.6 - 1.2 cm  PWT 1.6       Normal Values: 0.6 - 1.1 cm  LVIDd 5.8         Normal Values: 3.0 - 5.6 cm  LVIDs 3.2         Normal Values: 1.8 - 4.0 cm    Derived Variables:  LVMI g/m2  RWT  Fractional Short  Ejection Fraction    Doppler Peak v. AoV= (m/sec)    OBSERVATIONS:    Mitral Valve: normal, mild MR.  Aortic Valve/Aorta: normal trileaflet aortic valve.  Tricuspid Valve: normal with trace TR.  Pulmonic Valve: normal  Left Atrium: Enlarged  Right Atrium: normal  Left Ventricle: Severe concentric LVH with normal systolic function, estimated LVEF of 65%.  Right Ventricle: normal size and systolic function.  Pericardium/Pleura: no significant pleural effusion, no significant pericardial effusion.  Pulmonary/RV Pressure: Inadequate TR jet to estimate PA systolic pressure  LV Diastolic Function: Grade 2diastolic dysfunction.    Severe concentric LVH, and mild LV enlargement, with normal systolic function, estimated LVEF of 65%. Normal right ventricular size and systolic function. Grade 2diastolic dysfunction. Left atrial enlargement The aortic root is normal in size. The mitral valve is structurally normal, mild MR. The aortic valve is trileaflet without stenosis or insufficiency. Trace physiologic TR. Inadequate TR jet to estimate PA systolic pressure No significant pericardial effusion.                  JOVANNI ALVAREZ M.D., ATTENDING CARDIOLOGIST  This document has been electronically signed. Dec 25 2019 11:45AM

## 2024-06-07 NOTE — PROGRESS NOTE ADULT - ASSESSMENT
Pt is a 53M w/ PMHx of Afib, CAD, DM presenting for R heel ulcer debridement.   S/p zosyn x7 day course at facility  admitted for debridement  Prior WCx MRSA/E. faecalis from 3/26/24  6/3 Now w/ uptrending WBC and drainage from wound   6/4 S/p Selective debridement of wound 04-Jun-2024 10:52:57  Parviz Todd    6/3 WCx   Numerous Staphylococcus aureus  6/4  TCx MRSA, Escherichia coli ESBL  Right calcaneus bone pathology:  -   Fragments of bone with chronic osteomyelitis.  -   Focal acute osteomyelitis cannot be ruled out.    Recommendations:   C/w vancomycin, goal trough 15-20, dosing per pharmacy protocol and  w ESBL Ertapenem 1 gram IV daily -with osteo rec 6 weeks so last day 7/18  weekly cbc, CMP, ESR  will order PICC  Appreciate podiatry/wound care    Thank you for consulting us and involving us in the management of this most interesting and challenging case.  We will follow along in the care of this patient. Please call us at [phone] or text me directly on my cell# at [phone] with any concerns.

## 2024-06-07 NOTE — PHARMACOTHERAPY INTERVENTION NOTE - COMMENTS
Vancomycin Dosing Per Pharmacy  Patient is a 53 year old male being treated for a right heel wound on IV vancomycin 1000mg Q12.     Would culture growing ESBL E coli and E faecalis. Ceftriaxone escalated to ertapenem 1000mg QD.     1. Indication for the vancomycin: right heel debridement   2. Requesting Provider: Dr. Anne-Marie Li   3. Current plan and dosing regimen: 1000mg Q12   4. Day of therapy: 4  5. Cultures: tissue culture 5/26/24: E faecalis (susceptible to vancomycin) and MRSA. tissue culture 06/04/24: ESBL E coli and E faecalis.   6. Target trough or AUC/BAYLEE: 400-600  7. Day and time level is due: 06/09 0500  8. Previous levels: 06/04/24 (19:31): 22, 06/05 (05:57): 14, 06/06 (20:20): 14   9. Expected 24-hour AUC: 517  Vancomycin Dosing Per Pharmacy  Patient is a 53 year old male being treated for a right heel wound on IV vancomycin 1000mg Q12.     Would culture growing ESBL E coli and E faecalis. Ceftriaxone escalated to ertapenem 1000mg QD.     1. Indication for the vancomycin: right heel debridement   2. Requesting Provider: Dr. Anne-Marie Li   3. Current plan and dosing regimen: 1000mg Q12   4. Day of therapy: 4  5. Cultures: tissue culture 5/26/24: E faecalis (susceptible to vancomycin) and MRSA. Tissue culture 06/04/24: ESBL E coli and E faecalis susceptible to vancomycin  6. Target trough or AUC/BAYLEE: 400-600  7. Day and time level is due: 06/09 0500  8. Previous levels: 06/04/24 (19:31): 22, 06/05 (05:57): 14, 06/06 (20:20): 14   9. Expected 24-hour AUC: 517

## 2024-06-07 NOTE — DISCHARGE NOTE PROVIDER - CARE PROVIDER_API CALL
Parviz Todd-Jorge  Foot Surgery  [address] [place] [address]  Phone: [phone]  Fax: [phone]  Follow Up Time:     Juventino Whitten  Infectious Disease  [address] [place] [address]  Phone: [phone]  Fax: [phone]  Follow Up Time:     Grisel Murray  Internal Medicine  [address] [place] [address]  Phone: [phone]  Fax: [phone]  Follow Up Time:

## 2024-06-07 NOTE — DISCHARGE NOTE PROVIDER - PROVIDER TOKENS
PROVIDER:[TOKEN:[[id number]]],PROVIDER:[TOKEN:[[id number]]],PROVIDER:[TOKEN:[[id number]]]

## 2024-06-07 NOTE — DISCHARGE NOTE PROVIDER - HOSPITAL COURSE
admitted for heel ulcer   underwent OR debridement  found to have OM  ABX per ID  DC after ID and podiatry clearance admitted for heel ulcer   underwent OR debridement  found to have OM  ABX per ID  worsening rash  DC after ID and podiatry clearance

## 2024-06-07 NOTE — PROGRESS NOTE ADULT - ASSESSMENT
53M w/ PMHx of Afib, CAD s/p stents, CVA, DM, HTN, pulmonary embolism on Eliquis,  osteomyelitis s/p debridement presenting  presents to emergency department from [facility] for R heel ulcer debridement.    Afib, CAD, stents, HTN, PE on ELiquis  - Known  Afib, initially RVR.    - S/p Dig load.  Rates have been controlled  - continue cardizem 360mg CD and  Toprol 100 mg daily   - Hold on further doses of Digoxin. Had pauses last admission, so would use caution.  No pauses noted while on tele.   - BP stable and controlled   - continue Eliquis for AC    - 3/18/24: Technically difficult ECHO w/ normal LV size and function EF 50-55%, mildly reduced RV systolic function, mod dilated LA and RA, mod to sev MR, mild to mod TR, PASP 36  - Euvolemic on exam with no O2 requirement  - Continue torsemide po bid    - EKG showed A fib RVR, PVC @ 127.   - No evidence of any active ischemia   - Continue statin and ASA    - S/p surgical debridement of the right heel with bone biopsy, 6/4  - Tolerated procedure well, cardiac status optimal post operatively     - Monitor and replete lytes, keep K>4, Mg>2.  - Will continue to follow.    Nery Alex NP  Nurse Practitioner- Cardiology   Call TEAMS

## 2024-06-07 NOTE — PROGRESS NOTE ADULT - SUBJECTIVE AND OBJECTIVE BOX
[facility] DIVISION of INFECTIOUS DISEASE  Juventino Whitten MD PhD, Symone Hickey MD, Anne-Marie Li MD, Jeffery Jacobs MD, Ryan Romeo MD  and providing coverage with Melody Armstrong MD  Providing Infectious Disease Consultations at [facility], [facility], [facility]'s    Office# [phone] to schedule follow up appointments  Answering Service for urgent calls or New Consults [phone]  Cell# to text for urgent issues Juventino Whitten [phone]     infectious diseases progress note:    SARAH MORRISON is a 53y y. o. Male patient    Overnight and events of the last 24hrs reviewed    Allergies    No Known Drug Allergies  fish (Hives)    Intolerances        ANTIBIOTICS/RELEVANT:  antimicrobials  cefTRIAXone   IVPB      cefTRIAXone   IVPB 2000 milliGRAM(s) IV Intermittent every 24 hours  vancomycin  IVPB 1000 milliGRAM(s) IV Intermittent every 12 hours    immunologic:    OTHER:  acetaminophen     Tablet .. 650 milliGRAM(s) Oral every 6 hours PRN  aluminum hydroxide/magnesium hydroxide/simethicone Suspension 30 milliLiter(s) Oral every 4 hours PRN  apixaban 5 milliGRAM(s) Oral every 12 hours  ascorbic acid 500 milliGRAM(s) Oral daily  aspirin enteric coated 81 milliGRAM(s) Oral daily  atorvastatin 40 milliGRAM(s) Oral at bedtime  cholecalciferol 1000 Unit(s) Oral daily  dextrose 50% Injectable 12.5 Gram(s) IV Push once  dextrose 50% Injectable 25 Gram(s) IV Push once  dextrose Oral Gel 15 Gram(s) Oral once  diltiazem    milliGRAM(s) Oral daily  diphenhydrAMINE 25 milliGRAM(s) Oral every 4 hours PRN  ferrous    sulfate 325 milliGRAM(s) Oral two times a day  gabapentin 300 milliGRAM(s) Oral every 12 hours  glucagon  Injectable 1 milliGRAM(s) IntraMuscular once  insulin glargine Injectable (LANTUS) 10 Unit(s) SubCutaneous every morning  insulin lispro (ADMELOG) corrective regimen sliding scale   SubCutaneous three times a day before meals  insulin lispro (ADMELOG) corrective regimen sliding scale   SubCutaneous at bedtime  loratadine 10 milliGRAM(s) Oral daily  melatonin 5 milliGRAM(s) Oral at bedtime  metoclopramide 5 milliGRAM(s) Oral three times a day  metoprolol succinate  milliGRAM(s) Oral daily  morphine  - Injectable 2 milliGRAM(s) IV Push every 6 hours PRN  multivitamin/minerals 1 Tablet(s) Oral daily  naloxegol 25 milliGRAM(s) Oral daily  ondansetron Injectable 4 milliGRAM(s) IV Push every 8 hours PRN  oxybutynin 10 milliGRAM(s) Oral two times a day  oxyCODONE    IR 10 milliGRAM(s) Oral two times a day  pantoprazole    Tablet 40 milliGRAM(s) Oral before breakfast  polyethylene glycol 3350 17 Gram(s) Oral daily  potassium chloride    Tablet ER 20 milliEquivalent(s) Oral three times a day  senna 2 Tablet(s) Oral at bedtime  sodium chloride 0.65% Nasal 1 Spray(s) Both Nostrils three times a day PRN  sucralfate 1 Gram(s) Oral two times a day  tiotropium 2.5 MICROgram(s) Inhaler 2 Puff(s) Inhalation daily  torsemide 20 milliGRAM(s) Oral two times a day      Objective:  Vital Signs Last 24 Hrs  T(C): 36.8 (07 Jun 2024 05:49), Max: 36.9 (06 Jun 2024 20:25)  T(F): 98.3 (07 Jun 2024 05:49), Max: 98.4 (06 Jun 2024 20:25)  HR: 98 (07 Jun 2024 05:49) (58 - 98)  BP: 122/72 (07 Jun 2024 05:49) (122/72 - 147/75)  BP(mean): --  RR: 17 (07 Jun 2024 05:49) (17 - 17)  SpO2: 92% (07 Jun 2024 05:49) (92% - 92%)    Parameters below as of 07 Jun 2024 05:49  Patient On (Oxygen Delivery Method): room air        T(C): 36.8 (06-07-24 @ 05:49), Max: 36.9 (06-06-24 @ 12:04)  T(C): 36.8 (06-07-24 @ 05:49), Max: 37.2 (06-05-24 @ 01:18)  T(C): 36.8 (06-07-24 @ 05:49), Max: 37.2 (06-05-24 @ 01:18)    PHYSICAL EXAM:  HEENT: NC atraumatic  Neck: supple  Respiratory: no accessory muscle use, breathing comfortably  Cardiovascular: distant  Gastrointestinal: normal appearing, nondistended  Extremities: no clubbing, no cyanosis, feet wrapped      LABS:                          12.7   11.76 )-----------( 273      ( 06 Jun 2024 07:30 )             38.9       WBC  11.76 06-06 @ 07:30  11.30 06-04 @ 09:05  10.78 06-03 @ 08:00  12.41 06-02 @ 08:55      06-06    137  |  98  |  21  ----------------------------<  278<H>  3.6   |  31  |  0.98    Ca    9.5      06 Jun 2024 07:30        Creatinine: 0.98 mg/dL (06-06-24 @ 07:30)  Creatinine: 0.95 mg/dL (06-04-24 @ 09:05)  Creatinine: 1.10 mg/dL (06-03-24 @ 08:00)  Creatinine: 1.10 mg/dL (06-02-24 @ 08:55)  Creatinine: 1.00 mg/dL (06-01-24 @ 08:15)        Urinalysis Basic - ( 06 Jun 2024 07:30 )    Color: x / Appearance: x / SG: x / pH: x  Gluc: 278 mg/dL / Ketone: x  / Bili: x / Urobili: x   Blood: x / Protein: x / Nitrite: x   Leuk Esterase: x / RBC: x / WBC x   Sq Epi: x / Non Sq Epi: x / Bacteria: x            INFLAMMATORY MARKERS      MICROBIOLOGY:    Culture - Tissue with Gram Stain (06.04.24 @ 10:16)    Gram Stain:   Rare polymorphonuclear leukocytes per low power field  Few Gram positive cocci in pairs per oil power field  Few Gram Negative Rods per oil power field   -  Ampicillin: R >16 These ampicillin results predict results for amoxicillin   -  Ampicillin/Sulbactam: R 8/4   -  Aztreonam: R >16   -  Cefazolin: R >16   -  Cefepime: R >16   -  Ceftriaxone: R >32   -  Ciprofloxacin: R >2   -  Ertapenem: S <=0.5   -  Gentamicin: S <=2   -  Imipenem: S <=1   -  Levofloxacin: R >4   -  Meropenem: S <=1   -  Piperacillin/Tazobactam: R <=8   -  Trimethoprim/Sulfamethoxazole: S <=0.5/9.5   -  Tobramycin: S <=2   Specimen Source: .Tissue Other, RIGHT CALCANEOUS BONE CULTURE   Culture Results:   Few Escherichia coli ESBL   Organism Identification: Escherichia coli ESBL   Organism: Escherichia coli ESBL   Method Type: BAYLEE        RADIOLOGY & ADDITIONAL STUDIES:

## 2024-06-07 NOTE — PROCEDURE NOTE - PROCEDURE FINDINGS AND DETAILS
45cm bard single lumen power picc inserted into patent right basilic vein under sterile conditions and image guidance.  Tip is in the SVC.  PICC can be accessed.

## 2024-06-07 NOTE — PROGRESS NOTE ADULT - SUBJECTIVE AND OBJECTIVE BOX
Date of Service 06-07-24 @ 12:59    Patient is a 53y old  Male who presents with a chief complaint of heel wound (07 Jun 2024 12:39)      INTERVAL /OVERNIGHT EVENTS: c/o itch    MEDICATIONS  (STANDING):  apixaban 5 milliGRAM(s) Oral every 12 hours  ascorbic acid 500 milliGRAM(s) Oral daily  aspirin enteric coated 81 milliGRAM(s) Oral daily  atorvastatin 40 milliGRAM(s) Oral at bedtime  cholecalciferol 1000 Unit(s) Oral daily  dextrose 50% Injectable 12.5 Gram(s) IV Push once  dextrose 50% Injectable 25 Gram(s) IV Push once  dextrose Oral Gel 15 Gram(s) Oral once  diltiazem    milliGRAM(s) Oral daily  ertapenem  IVPB 1000 milliGRAM(s) IV Intermittent every 24 hours  ferrous    sulfate 325 milliGRAM(s) Oral two times a day  gabapentin 300 milliGRAM(s) Oral every 12 hours  glucagon  Injectable 1 milliGRAM(s) IntraMuscular once  insulin glargine Injectable (LANTUS) 10 Unit(s) SubCutaneous every morning  insulin lispro (ADMELOG) corrective regimen sliding scale   SubCutaneous three times a day before meals  insulin lispro (ADMELOG) corrective regimen sliding scale   SubCutaneous at bedtime  loratadine 10 milliGRAM(s) Oral daily  melatonin 5 milliGRAM(s) Oral at bedtime  metoclopramide 5 milliGRAM(s) Oral three times a day  metoprolol succinate  milliGRAM(s) Oral daily  multivitamin/minerals 1 Tablet(s) Oral daily  naloxegol 25 milliGRAM(s) Oral daily  oxybutynin 10 milliGRAM(s) Oral two times a day  oxyCODONE    IR 10 milliGRAM(s) Oral two times a day  pantoprazole    Tablet 40 milliGRAM(s) Oral before breakfast  polyethylene glycol 3350 17 Gram(s) Oral daily  potassium chloride    Tablet ER 20 milliEquivalent(s) Oral three times a day  senna 2 Tablet(s) Oral at bedtime  sucralfate 1 Gram(s) Oral two times a day  tiotropium 2.5 MICROgram(s) Inhaler 2 Puff(s) Inhalation daily  torsemide 20 milliGRAM(s) Oral two times a day  vancomycin  IVPB 1000 milliGRAM(s) IV Intermittent every 12 hours    MEDICATIONS  (PRN):  acetaminophen     Tablet .. 650 milliGRAM(s) Oral every 6 hours PRN Temp greater or equal to 38C (100.4F), Mild Pain (1 - 3)  aluminum hydroxide/magnesium hydroxide/simethicone Suspension 30 milliLiter(s) Oral every 4 hours PRN Dyspepsia  diphenhydrAMINE 25 milliGRAM(s) Oral every 4 hours PRN Rash and/or Itching  morphine  - Injectable 2 milliGRAM(s) IV Push every 6 hours PRN Severe Pain (7 - 10)  ondansetron Injectable 4 milliGRAM(s) IV Push every 8 hours PRN Nausea and/or Vomiting  sodium chloride 0.65% Nasal 1 Spray(s) Both Nostrils three times a day PRN Nasal Congestion      Allergies    No Known Drug Allergies  fish (Hives)    Intolerances        REVIEW OF SYSTEMS:  CONSTITUTIONAL: No fever, weight loss, or fatigue  EYES: No eye pain, visual disturbances, or discharge  ENMT:  No difficulty hearing, tinnitus, vertigo; No sinus or throat pain  NECK: No pain or stiffness  RESPIRATORY: No cough, wheezing, chills or hemoptysis; No shortness of breath  CARDIOVASCULAR: No chest pain, palpitations, dizziness, or leg swelling  GASTROINTESTINAL: No abdominal or epigastric pain. No nausea, vomiting, or hematemesis; No diarrhea or constipation. No melena or hematochezia.  GENITOURINARY: No dysuria, frequency, hematuria, or incontinence  NEUROLOGICAL: No headaches, memory loss, loss of strength, numbness, or tremors  SKIN: + rashes  LYMPH NODES: No enlarged glands  ENDOCRINE: No heat or cold intolerance; No hair loss; No polydipsia or polyuria  MUSCULOSKELETAL: No joint pain or swelling; No muscle, back, or extremity pain  PSYCHIATRIC: No depression, anxiety, mood swings, or difficulty sleeping  HEME/LYMPH: No easy bruising, or bleeding gums  ALLERGY AND IMMUNOLOGIC: No hives or eczema    Vital Signs Last 24 Hrs  T(C): 36.8 (07 Jun 2024 05:49), Max: 36.9 (06 Jun 2024 20:25)  T(F): 98.3 (07 Jun 2024 05:49), Max: 98.4 (06 Jun 2024 20:25)  HR: 98 (07 Jun 2024 05:49) (58 - 98)  BP: 122/72 (07 Jun 2024 05:49) (122/72 - 147/75)  BP(mean): --  RR: 17 (07 Jun 2024 05:49) (17 - 17)  SpO2: 92% (07 Jun 2024 05:49) (92% - 92%)    Parameters below as of 07 Jun 2024 05:49  Patient On (Oxygen Delivery Method): room air        PHYSICAL EXAM:  GENERAL: NAD, well-groomed, well-developed  HEAD:  Atraumatic, Normocephalic  EYES: EOMI, PERRLA, conjunctiva and sclera clear  ENMT: No tonsillar erythema, exudates, or enlargement; Moist mucous membranes, Good dentition, No lesions  NECK: Supple, No JVD, Normal thyroid  NERVOUS SYSTEM:  Alert & Oriented X3, Good concentration; Motor Strength 5/5 B/L upper and lower extremities; DTRs 2+ intact and symmetric  CHEST/LUNG: Clear to auscultation bilaterally; No rales, rhonchi, wheezing, or rubs  HEART: Regular rate and rhythm; No murmurs, rubs, or gallops  ABDOMEN: Soft, Nontender, Nondistended; Bowel sounds present  EXTREMITIES:  2+ Peripheral Pulses, No clubbing, cyanosis, or edema  LYMPH: No lymphadenopathy noted  SKIN: + rashes    LABS:      Ca    9.5        06 Jun 2024 07:30        Urinalysis Basic - ( 06 Jun 2024 07:30 )    Color: x / Appearance: x / SG: x / pH: x  Gluc: 278 mg/dL / Ketone: x  / Bili: x / Urobili: x   Blood: x / Protein: x / Nitrite: x   Leuk Esterase: x / RBC: x / WBC x   Sq Epi: x / Non Sq Epi: x / Bacteria: x      CAPILLARY BLOOD GLUCOSE      POCT Blood Glucose.: 310 mg/dL (07 Jun 2024 12:07)  POCT Blood Glucose.: 276 mg/dL (07 Jun 2024 08:13)  POCT Blood Glucose.: 257 mg/dL (06 Jun 2024 21:32)  POCT Blood Glucose.: 230 mg/dL (06 Jun 2024 16:48)      RADIOLOGY & ADDITIONAL TESTS:    Notes Reviewed:  [x ] YES  [ ] NO    Care Discussed with Consultants/Other Providers [x ] YES  [ ] NO

## 2024-06-07 NOTE — DISCHARGE NOTE PROVIDER - NSDCCPCAREPLAN_GEN_ALL_CORE_FT
PRINCIPAL DISCHARGE DIAGNOSIS  Diagnosis: Diabetic foot ulcer  Assessment and Plan of Treatment: follow up with podiatrist Dr. ROCHE

## 2024-06-07 NOTE — DISCHARGE NOTE PROVIDER - CARE PROVIDERS DIRECT ADDRESSES
,DirectAddress_Unknown,[email or web address],DirectAddress_Unknown

## 2024-06-08 LAB
GLUCOSE BLDC GLUCOMTR-MCNC: 201 MG/DL — HIGH (ref 70–99)
GLUCOSE BLDC GLUCOMTR-MCNC: 217 MG/DL — HIGH (ref 70–99)
GLUCOSE BLDC GLUCOMTR-MCNC: 226 MG/DL — HIGH (ref 70–99)
GLUCOSE BLDC GLUCOMTR-MCNC: 267 MG/DL — HIGH (ref 70–99)
HCT VFR BLD CALC: 42.1 % — SIGNIFICANT CHANGE UP (ref 39–50)
HGB BLD-MCNC: 13.3 G/DL — SIGNIFICANT CHANGE UP (ref 13–17)
MCHC RBC-ENTMCNC: 30.1 PG — SIGNIFICANT CHANGE UP (ref 27–34)
MCHC RBC-ENTMCNC: 31.6 GM/DL — LOW (ref 32–36)
MCV RBC AUTO: 95.2 FL — SIGNIFICANT CHANGE UP (ref 80–100)
NRBC # BLD: 0 /100 WBCS — SIGNIFICANT CHANGE UP (ref 0–0)
PLATELET # BLD AUTO: 280 K/UL — SIGNIFICANT CHANGE UP (ref 150–400)
RBC # BLD: 4.42 M/UL — SIGNIFICANT CHANGE UP (ref 4.2–5.8)
RBC # FLD: 14.6 % — HIGH (ref 10.3–14.5)
WBC # BLD: 11.16 K/UL — HIGH (ref 3.8–10.5)
WBC # FLD AUTO: 11.16 K/UL — HIGH (ref 3.8–10.5)

## 2024-06-08 PROCEDURE — 99232 SBSQ HOSP IP/OBS MODERATE 35: CPT

## 2024-06-08 RX ADMIN — Medication 360 MILLIGRAM(S): at 05:50

## 2024-06-08 RX ADMIN — MORPHINE SULFATE 2 MILLIGRAM(S): 50 CAPSULE, EXTENDED RELEASE ORAL at 04:08

## 2024-06-08 RX ADMIN — SENNA PLUS 2 TABLET(S): 8.6 TABLET ORAL at 21:26

## 2024-06-08 RX ADMIN — Medication 10 MILLIGRAM(S): at 17:34

## 2024-06-08 RX ADMIN — Medication 5 MILLIGRAM(S): at 21:27

## 2024-06-08 RX ADMIN — GABAPENTIN 300 MILLIGRAM(S): 400 CAPSULE ORAL at 05:50

## 2024-06-08 RX ADMIN — Medication 5 MILLIGRAM(S): at 21:26

## 2024-06-08 RX ADMIN — Medication 1 GRAM(S): at 17:34

## 2024-06-08 RX ADMIN — OXYCODONE HYDROCHLORIDE 10 MILLIGRAM(S): 5 TABLET ORAL at 05:51

## 2024-06-08 RX ADMIN — Medication 4: at 12:36

## 2024-06-08 RX ADMIN — Medication 4: at 17:33

## 2024-06-08 RX ADMIN — INSULIN GLARGINE 10 UNIT(S): 100 INJECTION, SOLUTION SUBCUTANEOUS at 07:59

## 2024-06-08 RX ADMIN — Medication 325 MILLIGRAM(S): at 05:52

## 2024-06-08 RX ADMIN — Medication 10 MILLIGRAM(S): at 05:51

## 2024-06-08 RX ADMIN — Medication 1000 UNIT(S): at 11:13

## 2024-06-08 RX ADMIN — Medication 25 MILLIGRAM(S): at 13:13

## 2024-06-08 RX ADMIN — Medication 20 MILLIGRAM(S): at 05:52

## 2024-06-08 RX ADMIN — MORPHINE SULFATE 2 MILLIGRAM(S): 50 CAPSULE, EXTENDED RELEASE ORAL at 03:53

## 2024-06-08 RX ADMIN — Medication 20 MILLIEQUIVALENT(S): at 11:13

## 2024-06-08 RX ADMIN — LORATADINE 10 MILLIGRAM(S): 10 TABLET ORAL at 21:32

## 2024-06-08 RX ADMIN — Medication 2: at 21:27

## 2024-06-08 RX ADMIN — NALOXEGOL OXALATE 25 MILLIGRAM(S): 12.5 TABLET, FILM COATED ORAL at 11:18

## 2024-06-08 RX ADMIN — ERTAPENEM SODIUM 120 MILLIGRAM(S): 1 INJECTION, POWDER, LYOPHILIZED, FOR SOLUTION INTRAMUSCULAR; INTRAVENOUS at 13:12

## 2024-06-08 RX ADMIN — Medication 325 MILLIGRAM(S): at 17:34

## 2024-06-08 RX ADMIN — APIXABAN 5 MILLIGRAM(S): 2.5 TABLET, FILM COATED ORAL at 05:51

## 2024-06-08 RX ADMIN — Medication 20 MILLIEQUIVALENT(S): at 05:52

## 2024-06-08 RX ADMIN — CHLORHEXIDINE GLUCONATE 1 APPLICATION(S): 213 SOLUTION TOPICAL at 06:51

## 2024-06-08 RX ADMIN — Medication 250 MILLIGRAM(S): at 17:33

## 2024-06-08 RX ADMIN — GABAPENTIN 300 MILLIGRAM(S): 400 CAPSULE ORAL at 17:34

## 2024-06-08 RX ADMIN — Medication 1 GRAM(S): at 05:52

## 2024-06-08 RX ADMIN — TIOTROPIUM BROMIDE 2 PUFF(S): 18 CAPSULE ORAL; RESPIRATORY (INHALATION) at 07:51

## 2024-06-08 RX ADMIN — Medication 5 MILLIGRAM(S): at 11:16

## 2024-06-08 RX ADMIN — Medication 20 MILLIEQUIVALENT(S): at 21:26

## 2024-06-08 RX ADMIN — PANTOPRAZOLE SODIUM 40 MILLIGRAM(S): 20 TABLET, DELAYED RELEASE ORAL at 05:50

## 2024-06-08 RX ADMIN — Medication 4: at 07:58

## 2024-06-08 RX ADMIN — OXYCODONE HYDROCHLORIDE 10 MILLIGRAM(S): 5 TABLET ORAL at 08:15

## 2024-06-08 RX ADMIN — Medication 81 MILLIGRAM(S): at 11:13

## 2024-06-08 RX ADMIN — Medication 20 MILLIGRAM(S): at 13:09

## 2024-06-08 RX ADMIN — MORPHINE SULFATE 2 MILLIGRAM(S): 50 CAPSULE, EXTENDED RELEASE ORAL at 13:13

## 2024-06-08 RX ADMIN — MORPHINE SULFATE 2 MILLIGRAM(S): 50 CAPSULE, EXTENDED RELEASE ORAL at 21:26

## 2024-06-08 RX ADMIN — Medication 500 MILLIGRAM(S): at 11:12

## 2024-06-08 RX ADMIN — OXYCODONE HYDROCHLORIDE 10 MILLIGRAM(S): 5 TABLET ORAL at 21:41

## 2024-06-08 RX ADMIN — MORPHINE SULFATE 2 MILLIGRAM(S): 50 CAPSULE, EXTENDED RELEASE ORAL at 22:26

## 2024-06-08 RX ADMIN — Medication 100 MILLIGRAM(S): at 05:51

## 2024-06-08 RX ADMIN — ATORVASTATIN CALCIUM 40 MILLIGRAM(S): 80 TABLET, FILM COATED ORAL at 21:26

## 2024-06-08 RX ADMIN — MORPHINE SULFATE 2 MILLIGRAM(S): 50 CAPSULE, EXTENDED RELEASE ORAL at 14:04

## 2024-06-08 RX ADMIN — Medication 5 MILLIGRAM(S): at 05:51

## 2024-06-08 RX ADMIN — Medication 1 TABLET(S): at 11:13

## 2024-06-08 RX ADMIN — POLYETHYLENE GLYCOL 3350 17 GRAM(S): 17 POWDER, FOR SOLUTION ORAL at 11:13

## 2024-06-08 RX ADMIN — OXYCODONE HYDROCHLORIDE 10 MILLIGRAM(S): 5 TABLET ORAL at 21:26

## 2024-06-08 RX ADMIN — APIXABAN 5 MILLIGRAM(S): 2.5 TABLET, FILM COATED ORAL at 17:34

## 2024-06-08 RX ADMIN — Medication 25 MILLIGRAM(S): at 21:27

## 2024-06-08 RX ADMIN — Medication 250 MILLIGRAM(S): at 07:52

## 2024-06-08 RX ADMIN — Medication 25 MILLIGRAM(S): at 03:53

## 2024-06-08 NOTE — PROGRESS NOTE ADULT - ASSESSMENT
53M w/ PMHx of Afib, CAD s/p stents, CVA, DM, HTN, pulmonary embolism on Eliquis,  osteomyelitis s/p debridement presenting  presents to emergency department from [facility] for R heel ulcer debridement.    Afib, CAD, stents, HTN, PE on ELiquis  - Known  Afib, initially RVR.    - S/p Dig load.  Rates have been controlled  - Continue cardizem 360mg CD and  Toprol 100 mg daily   - Hold on further doses of Digoxin. Had pauses last admission, so would use caution.  No pauses noted while on tele.   - BP stable and controlled   - Continue Eliquis for AC    - 3/18/24: Technically difficult ECHO w/ normal LV size and function EF 50-55%, mildly reduced RV systolic function, mod dilated LA and RA, mod to sev MR, mild to mod TR, PASP 36  - Euvolemic on exam with no O2 requirement  - Continue torsemide po bid    - EKG showed A fib RVR, PVC @ 127.   - No evidence of any active ischemia   - Continue statin and ASA    - S/p surgical debridement of the right heel with bone biopsy, 6/4  - Tolerated procedure well, cardiac status optimal post operatively     - Monitor and replete lytes, keep K>4, Mg>2.  - Will continue to follow.    Danny Arce, MS FNP, AGACNP  Nurse Practitioner- Cardiology   Please call on TEAMS

## 2024-06-08 NOTE — PROGRESS NOTE ADULT - ASSESSMENT
Claude Villareal ia a 53-year-old male past medical history hypertension, diabetes, Afib, pulmonary embolism on Eliquis, BPH presents to emergency department from [facility] for admission/surgical intervention of chronic right heel ulcer. per podiatry   Claude Villareal ia a 53-year-old male past medical history hypertension, diabetes, Afib, pulmonary embolism on Eliquis, BPH presents to emergency department from [facility] for admission  and surgical intervention of chronic right heel ulcer as per podiatrist.

## 2024-06-08 NOTE — PROGRESS NOTE ADULT - SUBJECTIVE AND OBJECTIVE BOX
Patient is a 53y old  Male who presents with a chief complaint of ankle wound (08 Jun 2024 09:19)      INTERVAL OVER NIGHT EVENTS:    MEDICATIONS  (STANDING):  apixaban 5 milliGRAM(s) Oral every 12 hours  ascorbic acid 500 milliGRAM(s) Oral daily  aspirin enteric coated 81 milliGRAM(s) Oral daily  atorvastatin 40 milliGRAM(s) Oral at bedtime  chlorhexidine 4% Liquid 1 Application(s) Topical <User Schedule>  cholecalciferol 1000 Unit(s) Oral daily  dextrose 50% Injectable 12.5 Gram(s) IV Push once  dextrose 50% Injectable 25 Gram(s) IV Push once  dextrose Oral Gel 15 Gram(s) Oral once  diltiazem    milliGRAM(s) Oral daily  ertapenem  IVPB 1000 milliGRAM(s) IV Intermittent every 24 hours  ferrous    sulfate 325 milliGRAM(s) Oral two times a day  gabapentin 300 milliGRAM(s) Oral every 12 hours  glucagon  Injectable 1 milliGRAM(s) IntraMuscular once  insulin glargine Injectable (LANTUS) 10 Unit(s) SubCutaneous every morning  insulin lispro (ADMELOG) corrective regimen sliding scale   SubCutaneous at bedtime  insulin lispro (ADMELOG) corrective regimen sliding scale   SubCutaneous three times a day before meals  loratadine 10 milliGRAM(s) Oral daily  melatonin 5 milliGRAM(s) Oral at bedtime  metoclopramide 5 milliGRAM(s) Oral three times a day  metoprolol succinate  milliGRAM(s) Oral daily  multivitamin/minerals 1 Tablet(s) Oral daily  naloxegol 25 milliGRAM(s) Oral daily  oxybutynin 10 milliGRAM(s) Oral two times a day  oxyCODONE    IR 10 milliGRAM(s) Oral two times a day  pantoprazole    Tablet 40 milliGRAM(s) Oral before breakfast  polyethylene glycol 3350 17 Gram(s) Oral daily  potassium chloride    Tablet ER 20 milliEquivalent(s) Oral three times a day  senna 2 Tablet(s) Oral at bedtime  sucralfate 1 Gram(s) Oral two times a day  tiotropium 2.5 MICROgram(s) Inhaler 2 Puff(s) Inhalation daily  torsemide 20 milliGRAM(s) Oral two times a day  vancomycin  IVPB 1000 milliGRAM(s) IV Intermittent every 12 hours    MEDICATIONS  (PRN):  acetaminophen     Tablet .. 650 milliGRAM(s) Oral every 6 hours PRN Temp greater or equal to 38C (100.4F), Mild Pain (1 - 3)  aluminum hydroxide/magnesium hydroxide/simethicone Suspension 30 milliLiter(s) Oral every 4 hours PRN Dyspepsia  diphenhydrAMINE 25 milliGRAM(s) Oral every 4 hours PRN Rash and/or Itching  morphine  - Injectable 2 milliGRAM(s) IV Push every 6 hours PRN Severe Pain (7 - 10)  ondansetron Injectable 4 milliGRAM(s) IV Push every 8 hours PRN Nausea and/or Vomiting  sodium chloride 0.65% Nasal 1 Spray(s) Both Nostrils three times a day PRN Nasal Congestion  sodium chloride 0.9% lock flush 10 milliLiter(s) IV Push every 1 hour PRN Pre/post blood products, medications, blood draw, and to maintain line patency      Allergies    No Known Drug Allergies  fish (Hives)    Intolerances        REVIEW OF SYSTEMS:  CONSTITUTIONAL: No fever, weight loss, or fatigue  EYES: No eye pain, visual disturbances, or discharge  ENMT:  No difficulty hearing, tinnitus, vertigo; No sinus or throat pain  NECK: No pain or stiffness  BREASTS: No pain, masses, or nipple discharge  RESPIRATORY: No cough, wheezing, chills or hemoptysis; No shortness of breath  CARDIOVASCULAR: No chest pain, palpitations, dizziness, or leg swelling  GASTROINTESTINAL: No abdominal or epigastric pain. No nausea, vomiting, or hematemesis; No diarrhea or constipation. No melena or hematochezia.  GENITOURINARY: No dysuria, frequency, hematuria, or incontinence  NEUROLOGICAL: No headaches, memory loss, loss of strength, numbness, or tremors  SKIN: No itching, burning, rashes, or lesions   LYMPH NODES: No enlarged glands  ENDOCRINE: No heat or cold intolerance; No hair loss  MUSCULOSKELETAL: No joint pain or swelling; No muscle, back, or extremity pain  PSYCHIATRIC: No depression, anxiety, mood swings, or difficulty sleeping  HEME/LYMPH: No easy bruising, or bleeding gums  ALLERGY AND IMMUNOLOGIC: No hives or eczema    Vital Signs Last 24 Hrs  T(C): 35.7 (08 Jun 2024 05:40), Max: 37.1 (07 Jun 2024 20:24)  T(F): 96.3 (08 Jun 2024 05:40), Max: 98.7 (07 Jun 2024 20:24)  HR: 74 (08 Jun 2024 05:40) (74 - 82)  BP: 112/73 (08 Jun 2024 05:40) (110/70 - 112/73)  BP(mean): --  RR: 17 (08 Jun 2024 05:40) (17 - 17)  SpO2: 90% (08 Jun 2024 05:40) (90% - 92%)    Parameters below as of 08 Jun 2024 05:40  Patient On (Oxygen Delivery Method): room air        PHYSICAL EXAM:  GENERAL: NAD, well-groomed, well-developed  HEAD:  Atraumatic, Normocephalic  EYES: EOMI, PERRLA, conjunctiva and sclera clear  ENMT: No tonsillar erythema, exudates, or enlargement; Moist mucous membranes, Good dentition, No lesions  NECK: Supple, No JVD, Normal thyroid  NERVOUS SYSTEM:  Alert & Oriented X3, Motor Strength 5/5 B/L upper and lower extremities; DTRs 2+ intact and symmetric  CHEST/LUNG: Clear to percussion bilaterally; No rales, rhonchi, wheezing, or rubs  HEART: Regular rate and rhythm; No murmurs, rubs, or gallops  ABDOMEN: Soft, Nontender, Nondistended; Bowel sounds present  EXTREMITIES:  2+ Peripheral Pulses, No clubbing, cyanosis, or edema  LYMPH: No lymphadenopathy noted  SKIN: No rashes or lesions    LABS:                        13.3   11.16 )-----------( 280      ( 08 Jun 2024 06:50 )             42.1       CAPILLARY BLOOD GLUCOSE      POCT Blood Glucose.: 217 mg/dL (08 Jun 2024 07:51)  POCT Blood Glucose.: 212 mg/dL (07 Jun 2024 21:26)  POCT Blood Glucose.: 210 mg/dL (07 Jun 2024 16:48)  POCT Blood Glucose.: 310 mg/dL (07 Jun 2024 12:07)      RADIOLOGY & ADDITIONAL TESTS:    Imaging Personally Reviewed:  [ ] YES  [ ] NO    Consultant(s) Notes Reviewed:  [ ] YES  [ ] NO    Care Discussed with Consultants/Other Providers [ ] YES  [ ] NO   Patient is a 53y old  Male who presents with a chief complaint of ankle wound (08 Jun 2024 09:19)      INTERVAL OVER NIGHT EVENTS:    MEDICATIONS  (STANDING):  apixaban 5 milliGRAM(s) Oral every 12 hours  ascorbic acid 500 milliGRAM(s) Oral daily  aspirin enteric coated 81 milliGRAM(s) Oral daily  atorvastatin 40 milliGRAM(s) Oral at bedtime  chlorhexidine 4% Liquid 1 Application(s) Topical <User Schedule>  cholecalciferol 1000 Unit(s) Oral daily  dextrose 50% Injectable 12.5 Gram(s) IV Push once  dextrose 50% Injectable 25 Gram(s) IV Push once  dextrose Oral Gel 15 Gram(s) Oral once  diltiazem    milliGRAM(s) Oral daily  ertapenem  IVPB 1000 milliGRAM(s) IV Intermittent every 24 hours  ferrous    sulfate 325 milliGRAM(s) Oral two times a day  gabapentin 300 milliGRAM(s) Oral every 12 hours  glucagon  Injectable 1 milliGRAM(s) IntraMuscular once  insulin glargine Injectable (LANTUS) 10 Unit(s) SubCutaneous every morning  insulin lispro (ADMELOG) corrective regimen sliding scale   SubCutaneous at bedtime  insulin lispro (ADMELOG) corrective regimen sliding scale   SubCutaneous three times a day before meals  loratadine 10 milliGRAM(s) Oral daily  melatonin 5 milliGRAM(s) Oral at bedtime  metoclopramide 5 milliGRAM(s) Oral three times a day  metoprolol succinate  milliGRAM(s) Oral daily  multivitamin/minerals 1 Tablet(s) Oral daily  naloxegol 25 milliGRAM(s) Oral daily  oxybutynin 10 milliGRAM(s) Oral two times a day  oxyCODONE    IR 10 milliGRAM(s) Oral two times a day  pantoprazole    Tablet 40 milliGRAM(s) Oral before breakfast  polyethylene glycol 3350 17 Gram(s) Oral daily  potassium chloride    Tablet ER 20 milliEquivalent(s) Oral three times a day  senna 2 Tablet(s) Oral at bedtime  sucralfate 1 Gram(s) Oral two times a day  tiotropium 2.5 MICROgram(s) Inhaler 2 Puff(s) Inhalation daily  torsemide 20 milliGRAM(s) Oral two times a day  vancomycin  IVPB 1000 milliGRAM(s) IV Intermittent every 12 hours    MEDICATIONS  (PRN):  acetaminophen     Tablet .. 650 milliGRAM(s) Oral every 6 hours PRN Temp greater or equal to 38C (100.4F), Mild Pain (1 - 3)  aluminum hydroxide/magnesium hydroxide/simethicone Suspension 30 milliLiter(s) Oral every 4 hours PRN Dyspepsia  diphenhydrAMINE 25 milliGRAM(s) Oral every 4 hours PRN Rash and/or Itching  morphine  - Injectable 2 milliGRAM(s) IV Push every 6 hours PRN Severe Pain (7 - 10)  ondansetron Injectable 4 milliGRAM(s) IV Push every 8 hours PRN Nausea and/or Vomiting  sodium chloride 0.65% Nasal 1 Spray(s) Both Nostrils three times a day PRN Nasal Congestion  sodium chloride 0.9% lock flush 10 milliLiter(s) IV Push every 1 hour PRN Pre/post blood products, medications, blood draw, and to maintain line patency      Allergies    No Known Drug Allergies  fish (Hives)    Intolerances    REVIEW OF SYSTEMS:  CONSTITUTIONAL: No fever, weight loss, or fatigue  EYES: No eye pain, visual disturbances, or discharge  ENMT:  No difficulty hearing, tinnitus, vertigo; No sinus or throat pain  NECK: No pain or stiffness  BREASTS: No pain, masses, or nipple discharge  RESPIRATORY: No cough, wheezing, chills or hemoptysis; No shortness of breath  CARDIOVASCULAR: No chest pain, palpitations, dizziness, or leg swelling  GASTROINTESTINAL: No abdominal or epigastric pain. No nausea, vomiting, or hematemesis; No diarrhea or constipation. No melena or hematochezia.  GENITOURINARY: No dysuria, frequency, hematuria, or incontinence  NEUROLOGICAL: No headaches, memory loss, loss of strength, numbness, or tremors  SKIN: No itching, burning, rashes, or lesions   LYMPH NODES: No enlarged glands  ENDOCRINE: No heat or cold intolerance; No hair loss  MUSCULOSKELETAL: No joint pain or swelling; No muscle, back, or extremity pain  PSYCHIATRIC: No depression, anxiety, mood swings, or difficulty sleeping  HEME/LYMPH: No easy bruising, or bleeding gums  ALLERGY AND IMMUNOLOGIC: No hives or eczema    Vital Signs Last 24 Hrs  T(C): 35.7 (08 Jun 2024 05:40), Max: 37.1 (07 Jun 2024 20:24)  T(F): 96.3 (08 Jun 2024 05:40), Max: 98.7 (07 Jun 2024 20:24)  HR: 74 (08 Jun 2024 05:40) (74 - 82)  BP: 112/73 (08 Jun 2024 05:40) (110/70 - 112/73)  BP(mean): --  RR: 17 (08 Jun 2024 05:40) (17 - 17)  SpO2: 90% (08 Jun 2024 05:40) (90% - 92%)    Parameters below as of 08 Jun 2024 05:40  Patient On (Oxygen Delivery Method): room air        PHYSICAL EXAM:  GENERAL: NAD, well-groomed, well-developed  HEAD:  Atraumatic, Normocephalic  EYES: EOMI, PERRLA, conjunctiva and sclera clear  ENMT: No tonsillar erythema, exudates, or enlargement; Moist mucous membranes, Good dentition, No lesions  NECK: Supple, No JVD, Normal thyroid  NERVOUS SYSTEM:  Alert & Oriented X3, Motor Strength 5/5 B/L upper and lower extremities; DTRs 2+ intact and symmetric  CHEST/LUNG: Clear to percussion bilaterally; No rales, rhonchi, wheezing, or rubs  HEART: Regular rate and rhythm; No murmurs, rubs, or gallops  ABDOMEN: Soft, Nontender, Nondistended; Bowel sounds present  EXTREMITIES:  Dressing present bilateral feet.  LYMPH: No lymphadenopathy noted  SKIN: No rashes or lesions    LABS:                        13.3   11.16 )-----------( 280      ( 08 Jun 2024 06:50 )             42.1       CAPILLARY BLOOD GLUCOSE      POCT Blood Glucose.: 217 mg/dL (08 Jun 2024 07:51)  POCT Blood Glucose.: 212 mg/dL (07 Jun 2024 21:26)  POCT Blood Glucose.: 210 mg/dL (07 Jun 2024 16:48)  POCT Blood Glucose.: 310 mg/dL (07 Jun 2024 12:07)      RADIOLOGY & ADDITIONAL TESTS:    Imaging Personally Reviewed:  [x ] YES  [ ] NO    Consultant(s) Notes Reviewed:  [x ] YES  [ ] NO    Care Discussed with Consultants/Other Providers [x ] YES  [ ] NO

## 2024-06-08 NOTE — PROGRESS NOTE ADULT - SUBJECTIVE AND OBJECTIVE BOX
[facility] -- Brittany Lutz, Reynaldo Sweeney Savella, Francesco Boss: Office # [phone]    Follow Up:   AFib, CAD, Cardiac Optimization    Subjective/Observations: Patient awake, alert, resting in bed. Denies chest pain, palpitations and dizziness. Denies any difficulty breathing. No orthopnea and PND. Tolerating room air. B/l foot DSD intact     REVIEW OF SYSTEMS: All other review of systems are negative unless indicated above    PAST MEDICAL & SURGICAL HISTORY:  Diabetes      Diabetes mellitus with no complication      Afib      Hypertension      Hypertension      BPH (benign prostatic hyperplasia)      Perforated gastric ulcer  s/p emergent ex-lap omentopexy and plication 6/2019      Pulmonary embolism      History of non-ST elevation myocardial infarction (NSTEMI)      Osteomyelitis  s/p debridement      CAD S/P percutaneous coronary angioplasty      Cerebrovascular accident      H/O abdominal surgery      Perforated gastric ulcer      Traumatic amputation of left foot, initial encounter    MEDICATIONS  (STANDING):  apixaban 5 milliGRAM(s) Oral every 12 hours  ascorbic acid 500 milliGRAM(s) Oral daily  aspirin enteric coated 81 milliGRAM(s) Oral daily  atorvastatin 40 milliGRAM(s) Oral at bedtime  chlorhexidine 4% Liquid 1 Application(s) Topical <User Schedule>  cholecalciferol 1000 Unit(s) Oral daily  dextrose 50% Injectable 12.5 Gram(s) IV Push once  dextrose 50% Injectable 25 Gram(s) IV Push once  dextrose Oral Gel 15 Gram(s) Oral once  diltiazem    milliGRAM(s) Oral daily  ertapenem  IVPB 1000 milliGRAM(s) IV Intermittent every 24 hours  ferrous    sulfate 325 milliGRAM(s) Oral two times a day  gabapentin 300 milliGRAM(s) Oral every 12 hours  glucagon  Injectable 1 milliGRAM(s) IntraMuscular once  insulin glargine Injectable (LANTUS) 10 Unit(s) SubCutaneous every morning  insulin lispro (ADMELOG) corrective regimen sliding scale   SubCutaneous at bedtime  insulin lispro (ADMELOG) corrective regimen sliding scale   SubCutaneous three times a day before meals  loratadine 10 milliGRAM(s) Oral daily  melatonin 5 milliGRAM(s) Oral at bedtime  metoclopramide 5 milliGRAM(s) Oral three times a day  metoprolol succinate  milliGRAM(s) Oral daily  multivitamin/minerals 1 Tablet(s) Oral daily  naloxegol 25 milliGRAM(s) Oral daily  oxybutynin 10 milliGRAM(s) Oral two times a day  oxyCODONE    IR 10 milliGRAM(s) Oral two times a day  pantoprazole    Tablet 40 milliGRAM(s) Oral before breakfast  polyethylene glycol 3350 17 Gram(s) Oral daily  potassium chloride    Tablet ER 20 milliEquivalent(s) Oral three times a day  senna 2 Tablet(s) Oral at bedtime  sucralfate 1 Gram(s) Oral two times a day  tiotropium 2.5 MICROgram(s) Inhaler 2 Puff(s) Inhalation daily  torsemide 20 milliGRAM(s) Oral two times a day  vancomycin  IVPB 1000 milliGRAM(s) IV Intermittent every 12 hours    MEDICATIONS  (PRN):  acetaminophen     Tablet .. 650 milliGRAM(s) Oral every 6 hours PRN Temp greater or equal to 38C (100.4F), Mild Pain (1 - 3)  aluminum hydroxide/magnesium hydroxide/simethicone Suspension 30 milliLiter(s) Oral every 4 hours PRN Dyspepsia  diphenhydrAMINE 25 milliGRAM(s) Oral every 4 hours PRN Rash and/or Itching  morphine  - Injectable 2 milliGRAM(s) IV Push every 6 hours PRN Severe Pain (7 - 10)  ondansetron Injectable 4 milliGRAM(s) IV Push every 8 hours PRN Nausea and/or Vomiting  sodium chloride 0.65% Nasal 1 Spray(s) Both Nostrils three times a day PRN Nasal Congestion  sodium chloride 0.9% lock flush 10 milliLiter(s) IV Push every 1 hour PRN Pre/post blood products, medications, blood draw, and to maintain line patency    Allergies    No Known Drug Allergies  fish (Hives)    Intolerances      Vital Signs Last 24 Hrs  T(C): 35.7 (08 Jun 2024 05:40), Max: 37.1 (07 Jun 2024 20:24)  T(F): 96.3 (08 Jun 2024 05:40), Max: 98.7 (07 Jun 2024 20:24)  HR: 74 (08 Jun 2024 05:40) (74 - 82)  BP: 112/73 (08 Jun 2024 05:40) (110/70 - 112/73)  BP(mean): --  RR: 17 (08 Jun 2024 05:40) (17 - 17)  SpO2: 90% (08 Jun 2024 05:40) (90% - 92%)    Parameters below as of 08 Jun 2024 05:40  Patient On (Oxygen Delivery Method): room air      I&O's Summary    07 Jun 2024 07:01  -  08 Jun 2024 07:00  --------------------------------------------------------  IN: 0 mL / OUT: 1550 mL / NET: -1550 mL          TELE: Not on telemetry   PHYSICAL EXAM:  Constitutional: NAD, awake and alert  HEENT: Moist Mucous Membranes, Anicteric  Pulmonary: Non-labored, breath sounds are clear bilaterally, No wheezing, rales or rhonchi  Cardiovascular: Regular, S1 and S2, + murmurs, No rubs, gallops or clicks  Gastrointestinal:  soft, nontender, nondistended   Lymph: No peripheral edema. No lymphadenopathy.   Skin: diffuse rash.  B/L foot DSD intact  Psych:  Mood & affect appropriate      LABS: All Labs Reviewed:                        13.3   11.16 )-----------( 280      ( 08 Jun 2024 06:50 )             42.1                         12.9   12.07 )-----------( 289      ( 07 Jun 2024 12:50 )             39.3                         12.7   11.76 )-----------( 273      ( 06 Jun 2024 07:30 )             38.9     06 Jun 2024 07:30    137    |  98     |  21     ----------------------------<  278    3.6     |  31     |  0.98     Ca    9.5        06 Jun 2024 07:30         12 Lead ECG:   Ventricular Rate 127 BPM    QRS Duration 82 ms    Q-T Interval 276 ms    QTC Calculation(Bazett) 401 ms    R Axis 13 degrees    T Axis 5 degrees    Diagnosis Line Atrial fibrillation with rapid ventricular response with premature ventricular or aberrantly conducted complexes  Low voltage QRS  Cannot rule out Anterior infarct , age undetermined  Abnormal ECG    Confirmed by darien France ([id number]) on 5/28/2024 2:53:37 PM (05-28-24 @ 11:53)      TRANSTHORACIC ECHOCARDIOGRAM REPORT  ________________________________________________________________________________                                      _______       Pt. Name:       SARAH MORRISON Study Date:    3/18/2024  MRN:            [id number]         YOB: 1970  Accession #:    [id number]        Age:           53 years  Account#:       [id number]       Gender:        M  Heart Rate:                      Height:        74.02 in (188.00 cm)  Rhythm:                          Weight:        244.71 lb (111.00 kg)  Blood Pressure: 100/60 mmHg      BSA/BMI:       2.37 m² / 31.41 kg/m²  ________________________________________________________________________________________  Referring Physician:    [id number] Hill Brizuela  Interpreting Physician: Mary Maya MD  Primary Sonographer:    Mounika FELDMAN    CPT:               ECHO TTE WO CON COMP W DOPP - 18838.m  Indication(s):     Dyspnea, unspecified - R06.00  Procedure:         Transthoracic echocardiogram with 2-D, M-mode and complete                     spectral and color flow Doppler.  Ordering Location: [facility]  Admission Status:  Inpatient  Study Information: Image quality for this study is technically difficult.    _______________________________________________________________________________________     CONCLUSIONS:      1. Technically difficult image quality.   2. Left ventricular cavity is normal in size. Left ventricular wall thickness is normal. Left ventricular systolic function is normal with an ejection fraction visually estimated at 50 to 55 %.   3. Normal right ventricular cavity size, with normal wall thickness, and mildly reduced systolic function.   4. The left atrium is moderately dilated.   5. The right atrium is moderately dilated.   6. Moderate to severe mitral regurgitation.   7. Mild to moderate tricuspid regurgitation.   8. Estimated pulmonary artery systolic pressure is 36 mmHg, consistent with mild pulmonary hypertension.   9. The inferior vena cava is dilated measuring 2.40 cm in diameter, (dilated >2.1cm) with normal inspiratory collapse (normal >50%) consistent with mildly elevated right atrial pressure (~8, range 5-10mmHg).  10. Trace pericardial effusion.    ________________________________________________________________________________________  FINDINGS:     Left Ventricle:  The left ventricular cavity is normal in size. Left ventricular wall thickness is normal. Left ventricular systolic function is normal with an ejection fraction visually estimated at 50 to 55%.The left ventricular diastolic function is indeterminate.     Right Ventricle:  The right ventricular cavity is normal in size, with normal wall thickness and mildly reduced systolic function.     Left Atrium:  The left atrium is moderately dilated.     Right Atrium:  The right atrium is moderately dilated.     Aortic Valve:  The aortic valve anatomy cannot be determined with normal systolic excursion. There is no aortic valve stenosis.     Mitral Valve:  There is mild calcification of the mitralvalve annulus. There is moderate to severe mitral regurgitation.     Tricuspid Valve:  Structurally normal tricuspid valve with normal leaflet excursion. There is mild to moderate tricuspid regurgitation. Estimated pulmonary artery systolic pressure is 36 mmHg, consistent with mild pulmonary hypertension.     Pulmonic Valve:  The pulmonic valve was not well visualized. There is trace pulmonic regurgitation.     Pericardium:  There is a trace pericardial effusion.     Systemic Veins:  The inferiorvena cava is dilated measuring 2.40 cm in diameter, (dilated >2.1cm) with normal inspiratory collapse (normal >50%) consistent with mildly elevated right atrial pressure (~8, range 5-10mmHg).  ____________________________________________________________________  QUANTITATIVE DATA:  Left Ventricle Measurements: (Indexed to BSA)     IVSd (2D):   1.9 cm  LVPWd (2D):  1.5 cm  LVIDd (2D):  5.3 cm  LVIDs (2D):  3.9 cm  LV Mass:     413 g  174.4 g/m²  Visualized LV EF%: 50 to 55%     MV E Vmax:    1.15 m/s  e' lateral:   10.90 cm/s  e' medial:    10.50 cm/s  E/e' lateral: 10.56  E/e' medial:  12.00  E/e' Average: 10.76  MV DT:        137 msec    Aorta Measurements: (Normal range) (Indexed to BSA)     Sinuses of Valsalva: 3.50 cm (3.1 - 3.7 cm)       Left Atrium Measurements: (Indexed to BSA)  LA Diam 2D: 3.99 cm       LVOT / RVOT/ Qp/Qs Data: (Indexed to BSA)  LVOT Diameter: 2.22 cm    Mitral Valve Measurements:     MV E Vmax: 1.2 m/s       Tricuspid Valve Measurements:     TR Vmax:          2.6m/s  TR Peak Gradient: 27.7 mmHg  RA Pressure:      8 mmHg  PASP:             36 mmHg    ________________________________________________________________________________________  Electronically signed on 3/19/2024 at 11:27:59 AM by Mary Maya MD         *** Final ***

## 2024-06-08 NOTE — PHARMACOTHERAPY INTERVENTION NOTE - COMMENTS
Vancomycin Dosing Per Pharmacy:  Patient is a 53 year old male being treated for a R heel wound, currently on IV vancomycin 1000mg Q12H + ertapenem 1000mg Q24H.     1. Indication for the vancomycin: R heel wound  2. Requesting Provider: Dr. Anne-Marie Li   3. Current plan and dosing regimen: 1000 mg Q12H  4. Day of therapy: 5  5. Cultures: tissue culture 5/26/24: E faecalis (susceptible to vancomycin) and MRSA. Tissue culture 06/04/24: ESBL E coli and E faecalis susceptible to vancomycin  6. Target trough or AUC/BAYLEE: 400-600  7. Day and time level is due: 6/9 at 05:00  8. Previous levels: 6/4/24 (19:31): 22, 6/5 (05:47): 14, 6/6 (20:20): 14   9. Expected 24-hour AUC: 555

## 2024-06-09 LAB
GLUCOSE BLDC GLUCOMTR-MCNC: 270 MG/DL — HIGH (ref 70–99)
GLUCOSE BLDC GLUCOMTR-MCNC: 283 MG/DL — HIGH (ref 70–99)
GLUCOSE BLDC GLUCOMTR-MCNC: 290 MG/DL — HIGH (ref 70–99)
GLUCOSE BLDC GLUCOMTR-MCNC: 300 MG/DL — HIGH (ref 70–99)
VANCOMYCIN TROUGH SERPL-MCNC: 14.4 UG/ML — SIGNIFICANT CHANGE UP (ref 10–20)

## 2024-06-09 RX ORDER — PANTOPRAZOLE SODIUM 20 MG/1
40 TABLET, DELAYED RELEASE ORAL
Refills: 0 | Status: DISCONTINUED | OUTPATIENT
Start: 2024-06-09 | End: 2024-06-13

## 2024-06-09 RX ORDER — INSULIN LISPRO 100/ML
3 VIAL (ML) SUBCUTANEOUS
Refills: 0 | Status: DISCONTINUED | OUTPATIENT
Start: 2024-06-09 | End: 2024-06-13

## 2024-06-09 RX ORDER — METOCLOPRAMIDE HCL 10 MG
5 TABLET ORAL THREE TIMES A DAY
Refills: 0 | Status: DISCONTINUED | OUTPATIENT
Start: 2024-06-09 | End: 2024-06-13

## 2024-06-09 RX ADMIN — Medication 10 MILLIGRAM(S): at 17:08

## 2024-06-09 RX ADMIN — GABAPENTIN 300 MILLIGRAM(S): 400 CAPSULE ORAL at 17:10

## 2024-06-09 RX ADMIN — Medication 1 GRAM(S): at 17:07

## 2024-06-09 RX ADMIN — NALOXEGOL OXALATE 25 MILLIGRAM(S): 12.5 TABLET, FILM COATED ORAL at 11:21

## 2024-06-09 RX ADMIN — Medication 325 MILLIGRAM(S): at 05:19

## 2024-06-09 RX ADMIN — CHLORHEXIDINE GLUCONATE 1 APPLICATION(S): 213 SOLUTION TOPICAL at 05:27

## 2024-06-09 RX ADMIN — Medication 500 MILLIGRAM(S): at 11:20

## 2024-06-09 RX ADMIN — ONDANSETRON 4 MILLIGRAM(S): 8 TABLET, FILM COATED ORAL at 06:59

## 2024-06-09 RX ADMIN — Medication 360 MILLIGRAM(S): at 05:19

## 2024-06-09 RX ADMIN — OXYCODONE HYDROCHLORIDE 10 MILLIGRAM(S): 5 TABLET ORAL at 05:49

## 2024-06-09 RX ADMIN — APIXABAN 5 MILLIGRAM(S): 2.5 TABLET, FILM COATED ORAL at 05:19

## 2024-06-09 RX ADMIN — Medication 25 MILLIGRAM(S): at 05:18

## 2024-06-09 RX ADMIN — Medication 6: at 07:51

## 2024-06-09 RX ADMIN — MORPHINE SULFATE 2 MILLIGRAM(S): 50 CAPSULE, EXTENDED RELEASE ORAL at 05:18

## 2024-06-09 RX ADMIN — Medication 1000 UNIT(S): at 11:21

## 2024-06-09 RX ADMIN — PANTOPRAZOLE SODIUM 40 MILLIGRAM(S): 20 TABLET, DELAYED RELEASE ORAL at 05:20

## 2024-06-09 RX ADMIN — Medication 6: at 11:58

## 2024-06-09 RX ADMIN — MORPHINE SULFATE 2 MILLIGRAM(S): 50 CAPSULE, EXTENDED RELEASE ORAL at 18:10

## 2024-06-09 RX ADMIN — Medication 1 GRAM(S): at 05:18

## 2024-06-09 RX ADMIN — Medication 5 MILLIGRAM(S): at 11:58

## 2024-06-09 RX ADMIN — OXYCODONE HYDROCHLORIDE 10 MILLIGRAM(S): 5 TABLET ORAL at 22:00

## 2024-06-09 RX ADMIN — LORATADINE 10 MILLIGRAM(S): 10 TABLET ORAL at 11:20

## 2024-06-09 RX ADMIN — Medication 2: at 21:11

## 2024-06-09 RX ADMIN — Medication 5 MILLIGRAM(S): at 21:07

## 2024-06-09 RX ADMIN — Medication 81 MILLIGRAM(S): at 11:21

## 2024-06-09 RX ADMIN — GABAPENTIN 300 MILLIGRAM(S): 400 CAPSULE ORAL at 05:19

## 2024-06-09 RX ADMIN — Medication 325 MILLIGRAM(S): at 17:07

## 2024-06-09 RX ADMIN — OXYCODONE HYDROCHLORIDE 10 MILLIGRAM(S): 5 TABLET ORAL at 05:19

## 2024-06-09 RX ADMIN — OXYCODONE HYDROCHLORIDE 10 MILLIGRAM(S): 5 TABLET ORAL at 21:07

## 2024-06-09 RX ADMIN — Medication 20 MILLIGRAM(S): at 17:08

## 2024-06-09 RX ADMIN — Medication 3 UNIT(S): at 17:06

## 2024-06-09 RX ADMIN — Medication 1 TABLET(S): at 11:20

## 2024-06-09 RX ADMIN — ATORVASTATIN CALCIUM 40 MILLIGRAM(S): 80 TABLET, FILM COATED ORAL at 21:07

## 2024-06-09 RX ADMIN — Medication 250 MILLIGRAM(S): at 17:11

## 2024-06-09 RX ADMIN — Medication 5 MILLIGRAM(S): at 11:21

## 2024-06-09 RX ADMIN — Medication 6: at 17:06

## 2024-06-09 RX ADMIN — Medication 250 MILLIGRAM(S): at 06:39

## 2024-06-09 RX ADMIN — Medication 25 MILLIGRAM(S): at 18:06

## 2024-06-09 RX ADMIN — Medication 10 MILLIGRAM(S): at 05:19

## 2024-06-09 RX ADMIN — MORPHINE SULFATE 2 MILLIGRAM(S): 50 CAPSULE, EXTENDED RELEASE ORAL at 06:18

## 2024-06-09 RX ADMIN — Medication 100 MILLIGRAM(S): at 05:18

## 2024-06-09 RX ADMIN — Medication 5 MILLIGRAM(S): at 05:19

## 2024-06-09 RX ADMIN — APIXABAN 5 MILLIGRAM(S): 2.5 TABLET, FILM COATED ORAL at 17:07

## 2024-06-09 RX ADMIN — Medication 20 MILLIEQUIVALENT(S): at 05:20

## 2024-06-09 RX ADMIN — Medication 20 MILLIGRAM(S): at 05:19

## 2024-06-09 RX ADMIN — SENNA PLUS 2 TABLET(S): 8.6 TABLET ORAL at 21:07

## 2024-06-09 NOTE — PROGRESS NOTE ADULT - ASSESSMENT
53y old  Male who presents with a chief complaint of ankle wound  Claude Mono is a  53y old  Male who presents with a chief complaint of ankle wound

## 2024-06-09 NOTE — PROGRESS NOTE ADULT - ASSESSMENT
Pt is a 53M w/ PMHx of Afib, CAD, DM presenting for R heel ulcer debridement.   S/p zosyn x7 day course at facility  admitted for debridement  Prior WCx MRSA/E. faecalis from 3/26/24  6/3 Now w/ uptrending WBC and drainage from wound   6/4 S/p Selective debridement of wound 04-Jun-2024 10:52:57  Parviz Todd    6/3 WCx   Numerous Staphylococcus aureus  6/4  TCx MRSA, Escherichia coli ESBL  Right calcaneus bone pathology:  -   Fragments of bone with chronic osteomyelitis.  -   Focal acute osteomyelitis cannot be ruled out.    Recommendations:   C/w vancomycin, goal trough 15-20, dosing per pharmacy protocol and  w ESBL Ertapenem 1 gram IV daily -with osteo rec 6 weeks so last day 7/18  weekly cbc, CMP, ESR  will order PICC  Appreciate podiatry/wound care    6/9- now with bullous lesions allergic due to antibx v autoimmune   d/w pt rash has been present for 9 months states had bx and was told contact dermatitis but is disseminated looks more like psoriasis   but the bullae new   ideally would want dermatology evaluation  pt nontoxic   would follow for now

## 2024-06-09 NOTE — PROGRESS NOTE ADULT - SUBJECTIVE AND OBJECTIVE BOX
Patient is a 53y old  Male who presents with a chief complaint of ankle wound (05 Jun 2024 12:07)      INTERVAL HPI/OVERNIGHT EVENTS:  T(C): 36.5 (06-05-24 @ 11:10), Max: 37.2 (06-05-24 @ 01:18)  HR: 97 (06-05-24 @ 11:10) (80 - 97)  BP: 116/81 (06-05-24 @ 11:10) (100/60 - 136/74)  RR: 18 (06-05-24 @ 11:10) (17 - 18)  SpO2: 92% (06-05-24 @ 11:10) (90% - 96%)  Wt(kg): --  I&O's Summary    04 Jun 2024 07:01  -  05 Jun 2024 07:00  --------------------------------------------------------  IN: 240 mL / OUT: 2650 mL / NET: -2410 mL        LABS:                        12.6   11.30 )-----------( 296      ( 04 Jun 2024 09:05 )             37.9     06-04    134<L>  |  98  |  17  ----------------------------<  334<H>  3.4<L>   |  29  |  0.95    Ca    8.8      04 Jun 2024 09:05        Urinalysis Basic - ( 04 Jun 2024 09:05 )    Color: x / Appearance: x / SG: x / pH: x  Gluc: 334 mg/dL / Ketone: x  / Bili: x / Urobili: x   Blood: x / Protein: x / Nitrite: x   Leuk Esterase: x / RBC: x / WBC x   Sq Epi: x / Non Sq Epi: x / Bacteria: x      CAPILLARY BLOOD GLUCOSE      POCT Blood Glucose.: 336 mg/dL (05 Jun 2024 11:59)  POCT Blood Glucose.: 216 mg/dL (05 Jun 2024 08:15)  POCT Blood Glucose.: 245 mg/dL (04 Jun 2024 21:43)  POCT Blood Glucose.: 118 mg/dL (04 Jun 2024 16:45)        Urinalysis Basic - ( 04 Jun 2024 09:05 )    Color: x / Appearance: x / SG: x / pH: x  Gluc: 334 mg/dL / Ketone: x  / Bili: x / Urobili: x   Blood: x / Protein: x / Nitrite: x   Leuk Esterase: x / RBC: x / WBC x   Sq Epi: x / Non Sq Epi: x / Bacteria: x        MEDICATIONS  (STANDING):  apixaban 5 milliGRAM(s) Oral every 12 hours  ascorbic acid 500 milliGRAM(s) Oral daily  aspirin enteric coated 81 milliGRAM(s) Oral daily  atorvastatin 40 milliGRAM(s) Oral at bedtime  cefTRIAXone   IVPB      cholecalciferol 1000 Unit(s) Oral daily  dextrose 50% Injectable 12.5 Gram(s) IV Push once  dextrose 50% Injectable 25 Gram(s) IV Push once  dextrose Oral Gel 15 Gram(s) Oral once  diltiazem    milliGRAM(s) Oral daily  ferrous    sulfate 325 milliGRAM(s) Oral two times a day  gabapentin 300 milliGRAM(s) Oral every 12 hours  glucagon  Injectable 1 milliGRAM(s) IntraMuscular once  insulin lispro (ADMELOG) corrective regimen sliding scale   SubCutaneous three times a day before meals  insulin lispro (ADMELOG) corrective regimen sliding scale   SubCutaneous at bedtime  loratadine 10 milliGRAM(s) Oral daily  melatonin 5 milliGRAM(s) Oral at bedtime  metoclopramide 5 milliGRAM(s) Oral three times a day  metoprolol succinate  milliGRAM(s) Oral daily  multivitamin/minerals 1 Tablet(s) Oral daily  naloxegol 25 milliGRAM(s) Oral daily  oxybutynin 10 milliGRAM(s) Oral two times a day  oxyCODONE    IR 10 milliGRAM(s) Oral two times a day  pantoprazole    Tablet 40 milliGRAM(s) Oral before breakfast  polyethylene glycol 3350 17 Gram(s) Oral daily  potassium chloride    Tablet ER 10 milliEquivalent(s) Oral four times a day  senna 2 Tablet(s) Oral at bedtime  sucralfate 1 Gram(s) Oral two times a day  tiotropium 2.5 MICROgram(s) Inhaler 2 Puff(s) Inhalation daily  torsemide 20 milliGRAM(s) Oral two times a day  vancomycin  IVPB 1000 milliGRAM(s) IV Intermittent every 12 hours    MEDICATIONS  (PRN):  acetaminophen     Tablet .. 650 milliGRAM(s) Oral every 6 hours PRN Temp greater or equal to 38C (100.4F), Mild Pain (1 - 3)  aluminum hydroxide/magnesium hydroxide/simethicone Suspension 30 milliLiter(s) Oral every 4 hours PRN Dyspepsia  diphenhydrAMINE 25 milliGRAM(s) Oral every 6 hours PRN Rash and/or Itching  morphine  - Injectable 2 milliGRAM(s) IV Push every 6 hours PRN Severe Pain (7 - 10)  ondansetron Injectable 4 milliGRAM(s) IV Push every 8 hours PRN Nausea and/or Vomiting  sodium chloride 0.65% Nasal 1 Spray(s) Both Nostrils three times a day PRN Nasal Congestion      REVIEW OF SYSTEMS:  CONSTITUTIONAL: No fever, weight loss, or fatigue  EYES: No eye pain, visual disturbances, or discharge  ENMT:  No difficulty hearing, tinnitus, vertigo; No sinus or throat pain  NECK: No pain or stiffness  RESPIRATORY: No cough, wheezing, chills or hemoptysis; No shortness of breath  CARDIOVASCULAR: No chest pain, palpitations, dizziness, or leg swelling  GASTROINTESTINAL: No abdominal or epigastric pain. No nausea, vomiting, or hematemesis; No diarrhea or constipation. No melena or hematochezia.  GENITOURINARY: No dysuria, frequency, hematuria, or incontinence  NEUROLOGICAL: No headaches, memory loss, loss of strength, numbness, or tremors  SKIN: No itching, burning, rashes, or lesions   LYMPH NODES: No enlarged glands  ENDOCRINE: No heat or cold intolerance; No hair loss  MUSCULOSKELETAL: No joint pain or swelling; No muscle, back, or extremity pain  PSYCHIATRIC: No depression, anxiety, mood swings, or difficulty sleeping  HEME/LYMPH: No easy bruising, or bleeding gums  ALLERY AND IMMUNOLOGIC: No hives or eczema    RADIOLOGY & ADDITIONAL TESTS:    Imaging Personally Reviewed:  [x ] YES  [ ] NO    Consultant(s) Notes Reviewed:  [ x] YES  [ ] NO    PHYSICAL EXAM:  GENERAL: NAD, well-groomed, well-developed  HEAD:  Atraumatic, Normocephalic  EYES: EOMI, PERRLA, conjunctiva and sclera clear  ENMT: No tonsillar erythema, exudates, or enlargement; Moist mucous membranes, Good dentition, No lesions  NECK: Supple, No JVD, Normal thyroid  NERVOUS SYSTEM:  Alert & Oriented X3, Good concentration; Motor Strength 5/5 B/L upper and lower extremities; DTRs 2+ intact and symmetric  CHEST/LUNG: Clear to percussion bilaterally; No rales, rhonchi, wheezing, or rubs  HEART: Regular rate and rhythm; No murmurs, rubs, or gallops  ABDOMEN: Soft, Nontender, Nondistended; Bowel sounds present  EXTREMITIES:  2+ Peripheral Pulses, No clubbing, cyanosis, or edema  LYMPH: No lymphadenopathy noted  SKIN: scattered maculopapular rash    Care Discussed with Consultants/Other Providers [x ] YES  [ ] NO Patient is a 53y old  Male who presents with a chief complaint of ankle wound (05 Jun 2024 12:07)     MEDICATIONS  (STANDING):  apixaban 5 milliGRAM(s) Oral every 12 hours  ascorbic acid 500 milliGRAM(s) Oral daily  aspirin enteric coated 81 milliGRAM(s) Oral daily  atorvastatin 40 milliGRAM(s) Oral at bedtime  cefTRIAXone   IVPB      cholecalciferol 1000 Unit(s) Oral daily  dextrose 50% Injectable 12.5 Gram(s) IV Push once  dextrose 50% Injectable 25 Gram(s) IV Push once  dextrose Oral Gel 15 Gram(s) Oral once  diltiazem    milliGRAM(s) Oral daily  ferrous    sulfate 325 milliGRAM(s) Oral two times a day  gabapentin 300 milliGRAM(s) Oral every 12 hours  glucagon  Injectable 1 milliGRAM(s) IntraMuscular once  insulin lispro (ADMELOG) corrective regimen sliding scale   SubCutaneous three times a day before meals  insulin lispro (ADMELOG) corrective regimen sliding scale   SubCutaneous at bedtime  loratadine 10 milliGRAM(s) Oral daily  melatonin 5 milliGRAM(s) Oral at bedtime  metoclopramide 5 milliGRAM(s) Oral three times a day  metoprolol succinate  milliGRAM(s) Oral daily  multivitamin/minerals 1 Tablet(s) Oral daily  naloxegol 25 milliGRAM(s) Oral daily  oxybutynin 10 milliGRAM(s) Oral two times a day  oxyCODONE    IR 10 milliGRAM(s) Oral two times a day  pantoprazole    Tablet 40 milliGRAM(s) Oral before breakfast  polyethylene glycol 3350 17 Gram(s) Oral daily  potassium chloride    Tablet ER 10 milliEquivalent(s) Oral four times a day  senna 2 Tablet(s) Oral at bedtime  sucralfate 1 Gram(s) Oral two times a day  tiotropium 2.5 MICROgram(s) Inhaler 2 Puff(s) Inhalation daily  torsemide 20 milliGRAM(s) Oral two times a day  vancomycin  IVPB 1000 milliGRAM(s) IV Intermittent every 12 hours    MEDICATIONS  (PRN):  acetaminophen     Tablet .. 650 milliGRAM(s) Oral every 6 hours PRN Temp greater or equal to 38C (100.4F), Mild Pain (1 - 3)  aluminum hydroxide/magnesium hydroxide/simethicone Suspension 30 milliLiter(s) Oral every 4 hours PRN Dyspepsia  diphenhydrAMINE 25 milliGRAM(s) Oral every 6 hours PRN Rash and/or Itching  morphine  - Injectable 2 milliGRAM(s) IV Push every 6 hours PRN Severe Pain (7 - 10)  ondansetron Injectable 4 milliGRAM(s) IV Push every 8 hours PRN Nausea and/or Vomiting  sodium chloride 0.65% Nasal 1 Spray(s) Both Nostrils three times a day PRN Nasal Congestion      REVIEW OF SYSTEMS:  CONSTITUTIONAL: No fever, weight loss, or fatigue  EYES: No eye pain, visual disturbances, or discharge  ENMT:  No difficulty hearing, tinnitus, vertigo; No sinus or throat pain  NECK: No pain or stiffness  RESPIRATORY: No cough, wheezing, chills or hemoptysis; No shortness of breath  CARDIOVASCULAR: No chest pain, palpitations, dizziness, or leg swelling  GASTROINTESTINAL: No abdominal or epigastric pain. No nausea, vomiting, or hematemesis; No diarrhea or constipation. No melena or hematochezia.  GENITOURINARY: No dysuria, frequency, hematuria, or incontinence  NEUROLOGICAL: No headaches, memory loss, loss of strength, numbness, or tremors  SKIN: No itching, burning, rashes, or lesions   LYMPH NODES: No enlarged glands  ENDOCRINE: No heat or cold intolerance; No hair loss  MUSCULOSKELETAL: No joint pain or swelling; No muscle, back, or extremity pain  PSYCHIATRIC: No depression, anxiety, mood swings, or difficulty sleeping  HEME/LYMPH: No easy bruising, or bleeding gums  ALLERY AND IMMUNOLOGIC: No hives or eczema    RADIOLOGY & ADDITIONAL TESTS:    Imaging Personally Reviewed:  [x ] YES  [ ] NO    Consultant(s) Notes Reviewed:  [ x] YES  [ ] NO    PHYSICAL EXAM:  GENERAL: NAD, well-groomed, well-developed  HEAD:  Atraumatic, Normocephalic  EYES: EOMI, PERRLA, conjunctiva and sclera clear  ENMT: No tonsillar erythema, exudates, or enlargement; Moist mucous membranes, Good dentition, No lesions  NECK: Supple, No JVD, Normal thyroid  NERVOUS SYSTEM:  Alert & Oriented X3, Good concentration; Motor Strength 5/5 B/L upper and lower extremities; DTRs 2+ intact and symmetric  CHEST/LUNG: Clear to percussion bilaterally; No rales, rhonchi, wheezing, or rubs  HEART: Regular rate and rhythm; No murmurs, rubs, or gallops  ABDOMEN: Soft, Nontender, Nondistended; Bowel sounds present  EXTREMITIES:  2+ Peripheral Pulses, No clubbing, cyanosis, or edema  LYMPH: No lymphadenopathy noted  SKIN: scattered maculopapular rash    Labs:                        13.3   11.16 )-----------( 280      ( 08 Jun 2024 06:50 )             42.1     CAPILLARY BLOOD GLUCOSE      POCT Blood Glucose.: 283 mg/dL (09 Jun 2024 16:49)  POCT Blood Glucose.: 300 mg/dL (09 Jun 2024 11:48)  POCT Blood Glucose.: 270 mg/dL (09 Jun 2024 07:42)  POCT Blood Glucose.: 267 mg/dL (08 Jun 2024 21:16)

## 2024-06-09 NOTE — PROGRESS NOTE ADULT - SUBJECTIVE AND OBJECTIVE BOX
[facility], Division of Infectious Diseases  DELGADO Rodriguez Y. Patel, S. Shah, G. Ousmane  [phone]  after hours and weekends [phone]    Name: SARAH MORRISON  Age: 53y  Gender: Male  MRN: [id number]    Interval History--  Notes reviewed  noted new bullous lesions on arms and hands     Allergies    No Known Drug Allergies  fish (Hives)    Intolerances        Medications--  Antibiotics:  ertapenem  IVPB 1000 milliGRAM(s) IV Intermittent every 24 hours  vancomycin  IVPB 1000 milliGRAM(s) IV Intermittent every 12 hours    Immunologic:    Other:  acetaminophen     Tablet .. PRN  aluminum hydroxide/magnesium hydroxide/simethicone Suspension PRN  apixaban  ascorbic acid  aspirin enteric coated  atorvastatin  chlorhexidine 4% Liquid  cholecalciferol  dextrose 50% Injectable  dextrose 50% Injectable  dextrose Oral Gel  diltiazem   CD  diphenhydrAMINE PRN  ferrous    sulfate  gabapentin  glucagon  Injectable  insulin glargine Injectable (LANTUS)  insulin lispro (ADMELOG) corrective regimen sliding scale  insulin lispro (ADMELOG) corrective regimen sliding scale  insulin lispro Injectable (ADMELOG)  loratadine  melatonin  metoclopramide Injectable  metoprolol succinate ER  morphine  - Injectable PRN  multivitamin/minerals  naloxegol  ondansetron Injectable PRN  oxybutynin  oxyCODONE    IR  pantoprazole    Tablet  polyethylene glycol 3350  potassium chloride    Tablet ER  senna  sodium chloride 0.65% Nasal PRN  sodium chloride 0.9% lock flush PRN  sucralfate  tiotropium 2.5 MICROgram(s) Inhaler  torsemide      Review of Systems--  A 10-point review of systems was obtained.     Pertinent positives and negatives--  Constitutional: No fevers. No Chills. No Rigors.   Cardiovascular: No chest pain. No palpitations.  Respiratory: No shortness of breath. No cough.  Gastrointestinal: No nausea or vomiting. No diarrhea or constipation.   Psychiatric: Pleasant. Appropriate affect.    Review of systems otherwise negative except as previously noted.    Physical Examination--  Vital Signs: T(F): 99 (06-09-24 @ 11:50), Max: 99 (06-09-24 @ 11:50)  HR: 90 (06-09-24 @ 11:50)  BP: 112/86 (06-09-24 @ 11:50)  RR: 18 (06-09-24 @ 11:50)  SpO2: 94% (06-09-24 @ 11:50)  Wt(kg): --  General: Nontoxic-appearing Male in no acute distress.  HEENT: AT/NC.   Neck: Not rigid. No sense of mass.  Nodes: None palpable.  Lungs: Clear bilaterally without rales, wheezing or rhonchi  Heart: Regular rate and rhythm.   Abdomen: Bowel sounds present and normoactive. Soft. Nondistended.   Extremities: No cyanosis or clubbing. + edema.   Skin:  eczematous rash hands and arms small bullous lesions  Psychiatric: Appropriate affect and mood for situation.         Laboratory Studies--  CBC                        13.3   11.16 )-----------( 280      ( 08 Jun 2024 06:50 )             42.1       Chemistries          Culture Data    Culture - Tissue with Gram Stain (collected 04 Jun 2024 10:16)  Source: .Tissue Other, RIGHT CALCANEOUS BONE CULTURE  Gram Stain (04 Jun 2024 18:18):    Rare polymorphonuclear leukocytes per low power field    Few Gram positive cocci in pairs per oil power field    Few Gram Negative Rods per oil power field  Preliminary Report (06 Jun 2024 16:16):    Few Escherichia coli ESBL  Organism: Escherichia coli ESBL (06 Jun 2024 16:16)  Organism: Escherichia coli ESBL (06 Jun 2024 16:16)    Culture - Tissue with Gram Stain (collected 04 Jun 2024 10:16)  Source: .Tissue Other, RIGHT FOOT TISSUE  Gram Stain (04 Jun 2024 18:18):    Rare polymorphonuclear leukocytes per low power field    Few Gram positive cocci in pairs per oil power field    Few Gram Negative Rods per oil power field  Final Report (07 Jun 2024 11:20):    Few Escherichia coli ESBL    Few Enterococcus faecalis    Rare Bacteroides vulgatus group "Susceptibilities not performed"  Organism: Escherichia coli ESBL  Enterococcus faecalis (07 Jun 2024 11:20)  Organism: Enterococcus faecalis (07 Jun 2024 11:20)  Organism: Escherichia coli ESBL (07 Jun 2024 11:20)    Culture - Other (collected 03 Jun 2024 13:15)  Source: Wound Wound  Final Report (05 Jun 2024 16:26):    Culture yields growth of greater than 3 colony types of    bacteria,  which may indicate contamination and normal jakub    Call client services within 7 days if further workup is clinically    indicated.    Culture includes    Numerous Methicillin Resistant Staphylococcus aureus  Organism: Methicillin resistant Staphylococcus aureus (05 Jun 2024 16:26)  Organism: Methicillin resistant Staphylococcus aureus (05 Jun 2024 16:26)

## 2024-06-09 NOTE — PHARMACOTHERAPY INTERVENTION NOTE - COMMENTS
Vancomycin Dosing Per Pharmacy:  Patient is a 53 year old male being treated for a R heel wound, currently on IV vancomycin 1000mg Q12H + ertapenem 1000mg Q24H.     1. Indication for the vancomycin: R heel wound  2. Requesting Provider: Dr. Anne-Marie Li   3. Current plan and dosing regimen: 1000 mg Q12H  4. Day of therapy: 6  5. Cultures: tissue culture 5/26/24: E faecalis (susceptible to vancomycin) and MRSA. Tissue culture 06/04/24: ESBL E coli and E faecalis susceptible to vancomycin  6. Target trough or AUC/BAYLEE: 400-600  7. Day and time level is due: 6/12 at 05:00  8. Previous levels: 6/4 (19:31): 22, 6/5 (05:47): 14, 6/6 (20:20): 14, 6/9 (04:58): 14.4  9. Expected 24-hour AUC: 522

## 2024-06-09 NOTE — CHART NOTE - NSCHARTNOTEFT_GEN_A_CORE
Called by RN about patient with worsening of rash overnight  - patient seen at bedside; around 4am noticed bullae/vesicles on both forearms  - nothing noted in oral mucosa or lower extremities  - has had maculopapular rash for past few days but these bullae are new  - will notify Dr. Whitten ID   - disscussed with Dr. Hardin ID will add benadryl cream and monitor for improvement

## 2024-06-10 LAB
ALBUMIN SERPL ELPH-MCNC: 2.5 G/DL — LOW (ref 3.3–5)
ALP SERPL-CCNC: 107 U/L — SIGNIFICANT CHANGE UP (ref 40–120)
ALT FLD-CCNC: 16 U/L — SIGNIFICANT CHANGE UP (ref 12–78)
ANION GAP SERPL CALC-SCNC: 7 MMOL/L — SIGNIFICANT CHANGE UP (ref 5–17)
AST SERPL-CCNC: 17 U/L — SIGNIFICANT CHANGE UP (ref 15–37)
BASOPHILS # BLD AUTO: 0.05 K/UL — SIGNIFICANT CHANGE UP (ref 0–0.2)
BASOPHILS NFR BLD AUTO: 0.4 % — SIGNIFICANT CHANGE UP (ref 0–2)
BILIRUB SERPL-MCNC: 0.6 MG/DL — SIGNIFICANT CHANGE UP (ref 0.2–1.2)
BUN SERPL-MCNC: 20 MG/DL — SIGNIFICANT CHANGE UP (ref 7–23)
CALCIUM SERPL-MCNC: 9.1 MG/DL — SIGNIFICANT CHANGE UP (ref 8.5–10.1)
CHLORIDE SERPL-SCNC: 100 MMOL/L — SIGNIFICANT CHANGE UP (ref 96–108)
CO2 SERPL-SCNC: 32 MMOL/L — HIGH (ref 22–31)
CREAT SERPL-MCNC: 1.3 MG/DL — SIGNIFICANT CHANGE UP (ref 0.5–1.3)
EGFR: 65 ML/MIN/1.73M2 — SIGNIFICANT CHANGE UP
EOSINOPHIL # BLD AUTO: 3.36 K/UL — HIGH (ref 0–0.5)
EOSINOPHIL NFR BLD AUTO: 26.9 % — HIGH (ref 0–6)
GLUCOSE BLDC GLUCOMTR-MCNC: 197 MG/DL — HIGH (ref 70–99)
GLUCOSE BLDC GLUCOMTR-MCNC: 210 MG/DL — HIGH (ref 70–99)
GLUCOSE BLDC GLUCOMTR-MCNC: 310 MG/DL — HIGH (ref 70–99)
GLUCOSE BLDC GLUCOMTR-MCNC: 324 MG/DL — HIGH (ref 70–99)
GLUCOSE SERPL-MCNC: 297 MG/DL — HIGH (ref 70–99)
HCT VFR BLD CALC: 40.8 % — SIGNIFICANT CHANGE UP (ref 39–50)
HGB BLD-MCNC: 12.6 G/DL — LOW (ref 13–17)
IMM GRANULOCYTES NFR BLD AUTO: 0.6 % — SIGNIFICANT CHANGE UP (ref 0–0.9)
LYMPHOCYTES # BLD AUTO: 1.4 K/UL — SIGNIFICANT CHANGE UP (ref 1–3.3)
LYMPHOCYTES # BLD AUTO: 11.2 % — LOW (ref 13–44)
MAGNESIUM SERPL-MCNC: 1.9 MG/DL — SIGNIFICANT CHANGE UP (ref 1.6–2.6)
MANUAL SMEAR VERIFICATION: SIGNIFICANT CHANGE UP
MCHC RBC-ENTMCNC: 30.4 PG — SIGNIFICANT CHANGE UP (ref 27–34)
MCHC RBC-ENTMCNC: 30.9 GM/DL — LOW (ref 32–36)
MCV RBC AUTO: 98.6 FL — SIGNIFICANT CHANGE UP (ref 80–100)
MONOCYTES # BLD AUTO: 1 K/UL — HIGH (ref 0–0.9)
MONOCYTES NFR BLD AUTO: 8 % — SIGNIFICANT CHANGE UP (ref 2–14)
NEUTROPHILS # BLD AUTO: 6.63 K/UL — SIGNIFICANT CHANGE UP (ref 1.8–7.4)
NEUTROPHILS NFR BLD AUTO: 52.9 % — SIGNIFICANT CHANGE UP (ref 43–77)
NRBC # BLD: 0 /100 WBCS — SIGNIFICANT CHANGE UP (ref 0–0)
PLAT MORPH BLD: NORMAL — SIGNIFICANT CHANGE UP
PLATELET # BLD AUTO: 286 K/UL — SIGNIFICANT CHANGE UP (ref 150–400)
POTASSIUM SERPL-MCNC: 3.5 MMOL/L — SIGNIFICANT CHANGE UP (ref 3.5–5.3)
POTASSIUM SERPL-SCNC: 3.5 MMOL/L — SIGNIFICANT CHANGE UP (ref 3.5–5.3)
PROT SERPL-MCNC: 6.7 G/DL — SIGNIFICANT CHANGE UP (ref 6–8.3)
RBC # BLD: 4.14 M/UL — LOW (ref 4.2–5.8)
RBC # FLD: 14.7 % — HIGH (ref 10.3–14.5)
RBC BLD AUTO: SIGNIFICANT CHANGE UP
SODIUM SERPL-SCNC: 139 MMOL/L — SIGNIFICANT CHANGE UP (ref 135–145)
WBC # BLD: 12.51 K/UL — HIGH (ref 3.8–10.5)
WBC # FLD AUTO: 12.51 K/UL — HIGH (ref 3.8–10.5)

## 2024-06-10 PROCEDURE — 99232 SBSQ HOSP IP/OBS MODERATE 35: CPT

## 2024-06-10 RX ADMIN — Medication 4: at 21:25

## 2024-06-10 RX ADMIN — Medication 1 TABLET(S): at 11:44

## 2024-06-10 RX ADMIN — MORPHINE SULFATE 2 MILLIGRAM(S): 50 CAPSULE, EXTENDED RELEASE ORAL at 05:53

## 2024-06-10 RX ADMIN — Medication 20 MILLIGRAM(S): at 14:11

## 2024-06-10 RX ADMIN — Medication 25 MILLIGRAM(S): at 21:28

## 2024-06-10 RX ADMIN — Medication 81 MILLIGRAM(S): at 11:44

## 2024-06-10 RX ADMIN — LORATADINE 10 MILLIGRAM(S): 10 TABLET ORAL at 21:27

## 2024-06-10 RX ADMIN — Medication 5 MILLIGRAM(S): at 11:43

## 2024-06-10 RX ADMIN — Medication 250 MILLIGRAM(S): at 05:49

## 2024-06-10 RX ADMIN — APIXABAN 5 MILLIGRAM(S): 2.5 TABLET, FILM COATED ORAL at 05:47

## 2024-06-10 RX ADMIN — APIXABAN 5 MILLIGRAM(S): 2.5 TABLET, FILM COATED ORAL at 16:56

## 2024-06-10 RX ADMIN — NALOXEGOL OXALATE 25 MILLIGRAM(S): 12.5 TABLET, FILM COATED ORAL at 11:41

## 2024-06-10 RX ADMIN — Medication 20 MILLIEQUIVALENT(S): at 11:43

## 2024-06-10 RX ADMIN — MORPHINE SULFATE 2 MILLIGRAM(S): 50 CAPSULE, EXTENDED RELEASE ORAL at 23:56

## 2024-06-10 RX ADMIN — Medication 25 MILLIGRAM(S): at 05:54

## 2024-06-10 RX ADMIN — GABAPENTIN 300 MILLIGRAM(S): 400 CAPSULE ORAL at 16:56

## 2024-06-10 RX ADMIN — Medication 100 MILLIGRAM(S): at 05:47

## 2024-06-10 RX ADMIN — Medication 1 GRAM(S): at 16:56

## 2024-06-10 RX ADMIN — ATORVASTATIN CALCIUM 40 MILLIGRAM(S): 80 TABLET, FILM COATED ORAL at 21:28

## 2024-06-10 RX ADMIN — Medication 360 MILLIGRAM(S): at 05:48

## 2024-06-10 RX ADMIN — TIOTROPIUM BROMIDE 2 PUFF(S): 18 CAPSULE ORAL; RESPIRATORY (INHALATION) at 06:01

## 2024-06-10 RX ADMIN — Medication 325 MILLIGRAM(S): at 16:56

## 2024-06-10 RX ADMIN — Medication 10 MILLIGRAM(S): at 05:48

## 2024-06-10 RX ADMIN — Medication 1 GRAM(S): at 05:47

## 2024-06-10 RX ADMIN — Medication 8: at 07:59

## 2024-06-10 RX ADMIN — OXYCODONE HYDROCHLORIDE 10 MILLIGRAM(S): 5 TABLET ORAL at 07:24

## 2024-06-10 RX ADMIN — Medication 325 MILLIGRAM(S): at 05:48

## 2024-06-10 RX ADMIN — MORPHINE SULFATE 2 MILLIGRAM(S): 50 CAPSULE, EXTENDED RELEASE ORAL at 23:41

## 2024-06-10 RX ADMIN — PANTOPRAZOLE SODIUM 40 MILLIGRAM(S): 20 TABLET, DELAYED RELEASE ORAL at 06:01

## 2024-06-10 RX ADMIN — Medication 2: at 16:56

## 2024-06-10 RX ADMIN — Medication 4: at 11:45

## 2024-06-10 RX ADMIN — Medication 1000 UNIT(S): at 11:43

## 2024-06-10 RX ADMIN — Medication 5 MILLIGRAM(S): at 21:27

## 2024-06-10 RX ADMIN — Medication 10 MILLIGRAM(S): at 16:56

## 2024-06-10 RX ADMIN — SENNA PLUS 2 TABLET(S): 8.6 TABLET ORAL at 21:28

## 2024-06-10 RX ADMIN — Medication 20 MILLIGRAM(S): at 05:47

## 2024-06-10 RX ADMIN — Medication 3 UNIT(S): at 08:00

## 2024-06-10 RX ADMIN — GABAPENTIN 300 MILLIGRAM(S): 400 CAPSULE ORAL at 05:47

## 2024-06-10 RX ADMIN — Medication 3 UNIT(S): at 16:57

## 2024-06-10 RX ADMIN — POLYETHYLENE GLYCOL 3350 17 GRAM(S): 17 POWDER, FOR SOLUTION ORAL at 11:42

## 2024-06-10 RX ADMIN — Medication 5 MILLIGRAM(S): at 05:47

## 2024-06-10 RX ADMIN — Medication 125 MILLIGRAM(S): at 11:54

## 2024-06-10 RX ADMIN — Medication 20 MILLIEQUIVALENT(S): at 05:48

## 2024-06-10 RX ADMIN — OXYCODONE HYDROCHLORIDE 10 MILLIGRAM(S): 5 TABLET ORAL at 05:47

## 2024-06-10 RX ADMIN — INSULIN GLARGINE 10 UNIT(S): 100 INJECTION, SOLUTION SUBCUTANEOUS at 08:01

## 2024-06-10 RX ADMIN — Medication 20 MILLIEQUIVALENT(S): at 21:28

## 2024-06-10 RX ADMIN — Medication 3 UNIT(S): at 11:45

## 2024-06-10 RX ADMIN — OXYCODONE HYDROCHLORIDE 10 MILLIGRAM(S): 5 TABLET ORAL at 21:27

## 2024-06-10 RX ADMIN — Medication 250 MILLIGRAM(S): at 16:59

## 2024-06-10 RX ADMIN — Medication 500 MILLIGRAM(S): at 11:43

## 2024-06-10 RX ADMIN — CHLORHEXIDINE GLUCONATE 1 APPLICATION(S): 213 SOLUTION TOPICAL at 05:50

## 2024-06-10 RX ADMIN — OXYCODONE HYDROCHLORIDE 10 MILLIGRAM(S): 5 TABLET ORAL at 22:00

## 2024-06-10 NOTE — SOCIAL WORK PROGRESS NOTE - NSSWPROGRESSNOTE_GEN_ALL_CORE
KAMLA met with pt at bedside; pt does not wish to be dc to [facility] at this time. Pt reports that he wishes to appeal dc at this time. KAMLA provided pt with [employer] peer review organization P#[phone]. KAMLA will continue to follow.

## 2024-06-10 NOTE — PROGRESS NOTE ADULT - SUBJECTIVE AND OBJECTIVE BOX
Return in about 3 months (around 10/12/2021)  53y year old Male seen at [facility] TELE 305 W1 s/p right foot debridement. Patient relates no overnight events and states that they are doing well at this time.  Denies any fever, chills, nausea, vomiting, chest pain, shortness of breath, or calf pain at this time.    Allergies    No Known Drug Allergies  fish (Hives)    Intolerances        MEDICATIONS  (STANDING):  apixaban 5 milliGRAM(s) Oral every 12 hours  ascorbic acid 500 milliGRAM(s) Oral daily  aspirin enteric coated 81 milliGRAM(s) Oral daily  atorvastatin 40 milliGRAM(s) Oral at bedtime  chlorhexidine 4% Liquid 1 Application(s) Topical <User Schedule>  cholecalciferol 1000 Unit(s) Oral daily  dextrose 50% Injectable 25 Gram(s) IV Push once  dextrose 50% Injectable 12.5 Gram(s) IV Push once  dextrose Oral Gel 15 Gram(s) Oral once  diltiazem    milliGRAM(s) Oral daily  ferrous    sulfate 325 milliGRAM(s) Oral two times a day  gabapentin 300 milliGRAM(s) Oral every 12 hours  glucagon  Injectable 1 milliGRAM(s) IntraMuscular once  insulin glargine Injectable (LANTUS) 10 Unit(s) SubCutaneous every morning  insulin lispro (ADMELOG) corrective regimen sliding scale   SubCutaneous three times a day before meals  insulin lispro (ADMELOG) corrective regimen sliding scale   SubCutaneous at bedtime  insulin lispro Injectable (ADMELOG) 3 Unit(s) SubCutaneous three times a day before meals  loratadine 10 milliGRAM(s) Oral daily  melatonin 5 milliGRAM(s) Oral at bedtime  metoclopramide Injectable 5 milliGRAM(s) IV Push three times a day  metoprolol succinate  milliGRAM(s) Oral daily  multivitamin/minerals 1 Tablet(s) Oral daily  naloxegol 25 milliGRAM(s) Oral daily  oxybutynin 10 milliGRAM(s) Oral two times a day  oxyCODONE    IR 10 milliGRAM(s) Oral two times a day  pantoprazole    Tablet 40 milliGRAM(s) Oral before breakfast  polyethylene glycol 3350 17 Gram(s) Oral daily  potassium chloride    Tablet ER 20 milliEquivalent(s) Oral three times a day  senna 2 Tablet(s) Oral at bedtime  sucralfate 1 Gram(s) Oral two times a day  tiotropium 2.5 MICROgram(s) Inhaler 2 Puff(s) Inhalation daily  torsemide 20 milliGRAM(s) Oral two times a day  vancomycin  IVPB 1000 milliGRAM(s) IV Intermittent every 12 hours    MEDICATIONS  (PRN):  acetaminophen     Tablet .. 650 milliGRAM(s) Oral every 6 hours PRN Temp greater or equal to 38C (100.4F), Mild Pain (1 - 3)  aluminum hydroxide/magnesium hydroxide/simethicone Suspension 30 milliLiter(s) Oral every 4 hours PRN Dyspepsia  diphenhydrAMINE 25 milliGRAM(s) Oral every 4 hours PRN Rash and/or Itching  morphine  - Injectable 2 milliGRAM(s) IV Push every 6 hours PRN Severe Pain (7 - 10)  ondansetron Injectable 4 milliGRAM(s) IV Push every 8 hours PRN Nausea and/or Vomiting  sodium chloride 0.65% Nasal 1 Spray(s) Both Nostrils three times a day PRN Nasal Congestion  sodium chloride 0.9% lock flush 10 milliLiter(s) IV Push every 1 hour PRN Pre/post blood products, medications, blood draw, and to maintain line patency      Vital Signs Last 24 Hrs  T(C): 36.7 (10 Paul 2024 19:50), Max: 37.1 (10 Paul 2024 05:23)  T(F): 98 (10 Paul 2024 19:50), Max: 98.7 (10 Paul 2024 05:23)  HR: 104 (10 Paul 2024 19:50) (75 - 104)  BP: 123/69 (10 Paul 2024 19:50) (110/68 - 123/69)  BP(mean): --  RR: 18 (10 Apul 2024 19:50) (18 - 20)  SpO2: 91% (10 Paul 2024 19:50) (91% - 95%)    Parameters below as of 10 Paul 2024 19:50  Patient On (Oxygen Delivery Method): room air        PHYSICAL EXAM:  Vascular: DP & PT faintly palpable to the right foot, Capillary refill 4 seconds  Neurological: Light touch sensation not intact bilaterally  Musculoskeletal: left foot s/p midfoot amputation, healed.  Dermatological: Right posterior heel ulceration down to skin, subcutaneous tissue, antibiotic beads in place, granular base.  Periwound erythema noted.  Noted new onset bullae b/l lower extremity                        12.6   12.51 )-----------( 286      ( 10 Paul 2024 06:42 )             40.8       06-10    139  |  100  |  20  ----------------------------<  297<H>  3.5   |  32<H>  |  1.30    Ca    9.1      10 Paul 2024 06:42  Mg     1.9     06-10    TPro  6.7  /  Alb  2.5<L>  /  TBili  0.6  /  DBili  x   /  AST  17  /  ALT  16  /  AlkPhos  107  06-10              Imaging: ----------   53y year old Male seen at [facility] TELE 305 W1 s/p right foot debridement. Patient relates no overnight events and states that they are doing well at this time.  Denies any fever, chills, nausea, vomiting, chest pain, shortness of breath, or calf pain at this time.    Allergies    No Known Drug Allergies  fish (Hives)    Intolerances        MEDICATIONS  (STANDING):  apixaban 5 milliGRAM(s) Oral every 12 hours  ascorbic acid 500 milliGRAM(s) Oral daily  aspirin enteric coated 81 milliGRAM(s) Oral daily  atorvastatin 40 milliGRAM(s) Oral at bedtime  chlorhexidine 4% Liquid 1 Application(s) Topical <User Schedule>  cholecalciferol 1000 Unit(s) Oral daily  dextrose 50% Injectable 25 Gram(s) IV Push once  dextrose 50% Injectable 12.5 Gram(s) IV Push once  dextrose Oral Gel 15 Gram(s) Oral once  diltiazem    milliGRAM(s) Oral daily  ferrous    sulfate 325 milliGRAM(s) Oral two times a day  gabapentin 300 milliGRAM(s) Oral every 12 hours  glucagon  Injectable 1 milliGRAM(s) IntraMuscular once  insulin glargine Injectable (LANTUS) 10 Unit(s) SubCutaneous every morning  insulin lispro (ADMELOG) corrective regimen sliding scale   SubCutaneous three times a day before meals  insulin lispro (ADMELOG) corrective regimen sliding scale   SubCutaneous at bedtime  insulin lispro Injectable (ADMELOG) 3 Unit(s) SubCutaneous three times a day before meals  loratadine 10 milliGRAM(s) Oral daily  melatonin 5 milliGRAM(s) Oral at bedtime  metoclopramide Injectable 5 milliGRAM(s) IV Push three times a day  metoprolol succinate  milliGRAM(s) Oral daily  multivitamin/minerals 1 Tablet(s) Oral daily  naloxegol 25 milliGRAM(s) Oral daily  oxybutynin 10 milliGRAM(s) Oral two times a day  oxyCODONE    IR 10 milliGRAM(s) Oral two times a day  pantoprazole    Tablet 40 milliGRAM(s) Oral before breakfast  polyethylene glycol 3350 17 Gram(s) Oral daily  potassium chloride    Tablet ER 20 milliEquivalent(s) Oral three times a day  senna 2 Tablet(s) Oral at bedtime  sucralfate 1 Gram(s) Oral two times a day  tiotropium 2.5 MICROgram(s) Inhaler 2 Puff(s) Inhalation daily  torsemide 20 milliGRAM(s) Oral two times a day  vancomycin  IVPB 1000 milliGRAM(s) IV Intermittent every 12 hours    MEDICATIONS  (PRN):  acetaminophen     Tablet .. 650 milliGRAM(s) Oral every 6 hours PRN Temp greater or equal to 38C (100.4F), Mild Pain (1 - 3)  aluminum hydroxide/magnesium hydroxide/simethicone Suspension 30 milliLiter(s) Oral every 4 hours PRN Dyspepsia  diphenhydrAMINE 25 milliGRAM(s) Oral every 4 hours PRN Rash and/or Itching  morphine  - Injectable 2 milliGRAM(s) IV Push every 6 hours PRN Severe Pain (7 - 10)  ondansetron Injectable 4 milliGRAM(s) IV Push every 8 hours PRN Nausea and/or Vomiting  sodium chloride 0.65% Nasal 1 Spray(s) Both Nostrils three times a day PRN Nasal Congestion  sodium chloride 0.9% lock flush 10 milliLiter(s) IV Push every 1 hour PRN Pre/post blood products, medications, blood draw, and to maintain line patency      Vital Signs Last 24 Hrs  T(C): 36.7 (10 Paul 2024 19:50), Max: 37.1 (10 Paul 2024 05:23)  T(F): 98 (10 Paul 2024 19:50), Max: 98.7 (10 Paul 2024 05:23)  HR: 104 (10 Paul 2024 19:50) (75 - 104)  BP: 123/69 (10 Paul 2024 19:50) (110/68 - 123/69)  BP(mean): --  RR: 18 (10 Paul 2024 19:50) (18 - 20)  SpO2: 91% (10 Paul 2024 19:50) (91% - 95%)    Parameters below as of 10 Paul 2024 19:50  Patient On (Oxygen Delivery Method): room air        PHYSICAL EXAM:  Vascular: DP & PT faintly palpable to the right foot, Capillary refill 4 seconds  Neurological: Light touch sensation not intact bilaterally  Musculoskeletal: left foot s/p midfoot amputation, healed.  Dermatological: Right posterior heel ulceration down to skin, subcutaneous tissue, fat, bone, antibiotic beads in place, granular base.  Mild periwound erythema noted.  Noted new onset bullae b/l lower extremity                        12.6   12.51 )-----------( 286      ( 10 Paul 2024 06:42 )             40.8       06-10    139  |  100  |  20  ----------------------------<  297<H>  3.5   |  32<H>  |  1.30    Ca    9.1      10 Paul 2024 06:42  Mg     1.9     06-10    TPro  6.7  /  Alb  2.5<L>  /  TBili  0.6  /  DBili  x   /  AST  17  /  ALT  16  /  AlkPhos  107  06-10

## 2024-06-10 NOTE — PROGRESS NOTE ADULT - SUBJECTIVE AND OBJECTIVE BOX
Date of Service 06-10-24 @ 15:46    Patient is a 53y old  Male who presents with a chief complaint of heel wound (10 Paul 2024 12:33)      INTERVAL /OVERNIGHT EVENTS: worsening rash ( now with bullae )    MEDICATIONS  (STANDING):  apixaban 5 milliGRAM(s) Oral every 12 hours  ascorbic acid 500 milliGRAM(s) Oral daily  aspirin enteric coated 81 milliGRAM(s) Oral daily  atorvastatin 40 milliGRAM(s) Oral at bedtime  chlorhexidine 4% Liquid 1 Application(s) Topical <User Schedule>  cholecalciferol 1000 Unit(s) Oral daily  dextrose 50% Injectable 12.5 Gram(s) IV Push once  dextrose 50% Injectable 25 Gram(s) IV Push once  dextrose Oral Gel 15 Gram(s) Oral once  diltiazem    milliGRAM(s) Oral daily  ferrous    sulfate 325 milliGRAM(s) Oral two times a day  gabapentin 300 milliGRAM(s) Oral every 12 hours  glucagon  Injectable 1 milliGRAM(s) IntraMuscular once  insulin glargine Injectable (LANTUS) 10 Unit(s) SubCutaneous every morning  insulin lispro (ADMELOG) corrective regimen sliding scale   SubCutaneous at bedtime  insulin lispro (ADMELOG) corrective regimen sliding scale   SubCutaneous three times a day before meals  insulin lispro Injectable (ADMELOG) 3 Unit(s) SubCutaneous three times a day before meals  loratadine 10 milliGRAM(s) Oral daily  melatonin 5 milliGRAM(s) Oral at bedtime  metoclopramide Injectable 5 milliGRAM(s) IV Push three times a day  metoprolol succinate  milliGRAM(s) Oral daily  multivitamin/minerals 1 Tablet(s) Oral daily  naloxegol 25 milliGRAM(s) Oral daily  oxybutynin 10 milliGRAM(s) Oral two times a day  oxyCODONE    IR 10 milliGRAM(s) Oral two times a day  pantoprazole    Tablet 40 milliGRAM(s) Oral before breakfast  polyethylene glycol 3350 17 Gram(s) Oral daily  potassium chloride    Tablet ER 20 milliEquivalent(s) Oral three times a day  senna 2 Tablet(s) Oral at bedtime  sucralfate 1 Gram(s) Oral two times a day  tiotropium 2.5 MICROgram(s) Inhaler 2 Puff(s) Inhalation daily  torsemide 20 milliGRAM(s) Oral two times a day  vancomycin  IVPB 1000 milliGRAM(s) IV Intermittent every 12 hours    MEDICATIONS  (PRN):  acetaminophen     Tablet .. 650 milliGRAM(s) Oral every 6 hours PRN Temp greater or equal to 38C (100.4F), Mild Pain (1 - 3)  aluminum hydroxide/magnesium hydroxide/simethicone Suspension 30 milliLiter(s) Oral every 4 hours PRN Dyspepsia  diphenhydrAMINE 25 milliGRAM(s) Oral every 4 hours PRN Rash and/or Itching  morphine  - Injectable 2 milliGRAM(s) IV Push every 6 hours PRN Severe Pain (7 - 10)  ondansetron Injectable 4 milliGRAM(s) IV Push every 8 hours PRN Nausea and/or Vomiting  sodium chloride 0.65% Nasal 1 Spray(s) Both Nostrils three times a day PRN Nasal Congestion  sodium chloride 0.9% lock flush 10 milliLiter(s) IV Push every 1 hour PRN Pre/post blood products, medications, blood draw, and to maintain line patency      Allergies    No Known Drug Allergies  fish (Hives)    Intolerances        REVIEW OF SYSTEMS:  CONSTITUTIONAL: No fever, weight loss, or fatigue  EYES: No eye pain, visual disturbances, or discharge  ENMT:  No difficulty hearing, tinnitus, vertigo; No sinus or throat pain  NECK: No pain or stiffness  RESPIRATORY: No cough, wheezing, chills or hemoptysis; No shortness of breath  CARDIOVASCULAR: No chest pain, palpitations, dizziness, or leg swelling  GASTROINTESTINAL: No abdominal or epigastric pain. No nausea, vomiting, or hematemesis; No diarrhea or constipation. No melena or hematochezia.  GENITOURINARY: No dysuria, frequency, hematuria, or incontinence  NEUROLOGICAL: No headaches, memory loss, loss of strength, numbness, or tremors  SKIN: + rashes  LYMPH NODES: No enlarged glands  ENDOCRINE: No heat or cold intolerance; No hair loss; No polydipsia or polyuria  MUSCULOSKELETAL: No joint pain or swelling; No muscle, back, or extremity pain  PSYCHIATRIC: No depression, anxiety, mood swings, or difficulty sleeping  HEME/LYMPH: No easy bruising, or bleeding gums  ALLERGY AND IMMUNOLOGIC: No hives or eczema    Vital Signs Last 24 Hrs  T(C): 36.7 (10 Paul 2024 12:00), Max: 37.1 (09 Jun 2024 20:27)  T(F): 98 (10 Paul 2024 12:00), Max: 98.8 (09 Jun 2024 20:27)  HR: 81 (10 Paul 2024 12:00) (75 - 88)  BP: 116/78 (10 Paul 2024 12:00) (110/68 - 132/78)  BP(mean): --  RR: 20 (10 Paul 2024 12:00) (18 - 20)  SpO2: 95% (10 Paul 2024 12:00) (94% - 95%)    Parameters below as of 10 Paul 2024 12:00  Patient On (Oxygen Delivery Method): room air        PHYSICAL EXAM:  GENERAL: NAD, well-groomed, well-developed  HEAD:  Atraumatic, Normocephalic  EYES: EOMI, PERRLA, conjunctiva and sclera clear  ENMT: No tonsillar erythema, exudates, or enlargement; Moist mucous membranes, Good dentition, No lesions  NECK: Supple, No JVD, Normal thyroid  NERVOUS SYSTEM:  Alert & Oriented X3, Good concentration; Motor Strength 5/5 B/L upper and lower extremities; DTRs 2+ intact and symmetric  CHEST/LUNG: Clear to auscultation bilaterally; No rales, rhonchi, wheezing, or rubs  HEART: Regular rate and rhythm; No murmurs, rubs, or gallops  ABDOMEN: Soft, Nontender, Nondistended; Bowel sounds present  EXTREMITIES:  2+ Peripheral Pulses, No clubbing, cyanosis, or edema  LYMPH: No lymphadenopathy noted  SKIN: + rashes    LABS:                        12.6   12.51 )-----------( 286      ( 10 Paul 2024 06:42 )             40.8     10 Paul 2024 06:42    139    |  100    |  20     ----------------------------<  297    3.5     |  32     |  1.30     Ca    9.1        10 Paul 2024 06:42  Mg     1.9       10 Paul 2024 06:42    TPro  6.7    /  Alb  2.5    /  TBili  0.6    /  DBili  x      /  AST  17     /  ALT  16     /  AlkPhos  107    10 Paul 2024 06:42      Urinalysis Basic - ( 10 Paul 2024 06:42 )    Color: x / Appearance: x / SG: x / pH: x  Gluc: 297 mg/dL / Ketone: x  / Bili: x / Urobili: x   Blood: x / Protein: x / Nitrite: x   Leuk Esterase: x / RBC: x / WBC x   Sq Epi: x / Non Sq Epi: x / Bacteria: x      CAPILLARY BLOOD GLUCOSE      POCT Blood Glucose.: 210 mg/dL (10 Paul 2024 11:40)  POCT Blood Glucose.: 310 mg/dL (10 Paul 2024 07:46)  POCT Blood Glucose.: 290 mg/dL (09 Jun 2024 21:06)  POCT Blood Glucose.: 283 mg/dL (09 Jun 2024 16:49)      RADIOLOGY & ADDITIONAL TESTS:    Notes Reviewed:  [x ] YES  [ ] NO    Care Discussed with Consultants/Other Providers [x ] YES  [ ] NO

## 2024-06-10 NOTE — PHARMACOTHERAPY INTERVENTION NOTE - COMMENTS
Vancomycin Dosing Per Pharmacy:  Patient is a 53 year old male being treated for a R heel wound, currently on IV vancomycin 1000mg Q12H. Entered trough order sooner on 6/11 AM labs as renal function has worsened.    1. Indication for the vancomycin: R heel wound  2. Requesting Provider: Dr. Anne-Marie Li   3. Current plan and dosing regimen: 1000 mg Q12H  4. Day of therapy: 7  5. Cultures: tissue culture 5/26/24: E faecalis (susceptible to vancomycin) and MRSA. Tissue culture 06/04/24: ESBL E coli and E faecalis susceptible to vancomycin  6. Target trough or AUC/BAYLEE: 400-600  7. Day and time level is due: 6/11 at 05:00  8. Previous levels: 6/4 (19:31): 22, 6/5 (05:47): 14, 6/6 (20:20): 14, 6/9 (04:58): 14.4  9. Expected 24-hour AUC: 634

## 2024-06-10 NOTE — CHART NOTE - NSCHARTNOTEFT_GEN_A_CORE
Assessment: Pt seen for nutrition follow-up. Chart reviewed, hospital course noted.    Brief hx: Pt is a "53 M w/ PMHx of Afib, CAD, DM presenting for R heel ulcer debridement."    Visited pt at bedside this afternoon. Pt reports fair appetite/intake. Observed ~50% of lunch meal today. Pt reports decrease po intake x 2 days 2/2 nausea. Appetite improved today. Denies N/V currently. +BM 6/9 per pt report; bowel regimen rx. Pt continues on consistent carbohydrate diet. Agreeable to receive ensure max protein shake for additional kcal/protein. Additional food preferences obtained to optimize po intake/tolerance. RD remains available and will continue to follow-up.     Factors impacting intake: [X] none [ ] nausea  [ ] vomiting [ ] diarrhea [ ] constipation  [ ]chewing problems [ ] swallowing issues  [ ] other:     Diet Prescription: Diet, Consistent Carbohydrate/No Snacks (06-04-24 @ 11:06)    Intake: variable; overall fair    Current Weight: Weight (kg): 106.6 (06-04 @ 09:21), 6/10 234.7# (no edema noted)  % Weight Change    Pertinent Medications: MEDICATIONS  (STANDING):  apixaban 5 milliGRAM(s) Oral every 12 hours  ascorbic acid 500 milliGRAM(s) Oral daily  aspirin enteric coated 81 milliGRAM(s) Oral daily  atorvastatin 40 milliGRAM(s) Oral at bedtime  chlorhexidine 4% Liquid 1 Application(s) Topical <User Schedule>  cholecalciferol 1000 Unit(s) Oral daily  dextrose 50% Injectable 25 Gram(s) IV Push once  dextrose 50% Injectable 12.5 Gram(s) IV Push once  dextrose Oral Gel 15 Gram(s) Oral once  diltiazem    milliGRAM(s) Oral daily  ferrous    sulfate 325 milliGRAM(s) Oral two times a day  gabapentin 300 milliGRAM(s) Oral every 12 hours  glucagon  Injectable 1 milliGRAM(s) IntraMuscular once  insulin glargine Injectable (LANTUS) 10 Unit(s) SubCutaneous every morning  insulin lispro (ADMELOG) corrective regimen sliding scale   SubCutaneous three times a day before meals  insulin lispro (ADMELOG) corrective regimen sliding scale   SubCutaneous at bedtime  insulin lispro Injectable (ADMELOG) 3 Unit(s) SubCutaneous three times a day before meals  loratadine 10 milliGRAM(s) Oral daily  melatonin 5 milliGRAM(s) Oral at bedtime  metoclopramide Injectable 5 milliGRAM(s) IV Push three times a day  metoprolol succinate  milliGRAM(s) Oral daily  multivitamin/minerals 1 Tablet(s) Oral daily  naloxegol 25 milliGRAM(s) Oral daily  oxybutynin 10 milliGRAM(s) Oral two times a day  oxyCODONE    IR 10 milliGRAM(s) Oral two times a day  pantoprazole    Tablet 40 milliGRAM(s) Oral before breakfast  polyethylene glycol 3350 17 Gram(s) Oral daily  potassium chloride    Tablet ER 20 milliEquivalent(s) Oral three times a day  senna 2 Tablet(s) Oral at bedtime  sucralfate 1 Gram(s) Oral two times a day  tiotropium 2.5 MICROgram(s) Inhaler 2 Puff(s) Inhalation daily  torsemide 20 milliGRAM(s) Oral two times a day  vancomycin  IVPB 1000 milliGRAM(s) IV Intermittent every 12 hours    MEDICATIONS  (PRN):  acetaminophen     Tablet .. 650 milliGRAM(s) Oral every 6 hours PRN Temp greater or equal to 38C (100.4F), Mild Pain (1 - 3)  aluminum hydroxide/magnesium hydroxide/simethicone Suspension 30 milliLiter(s) Oral every 4 hours PRN Dyspepsia  diphenhydrAMINE 25 milliGRAM(s) Oral every 4 hours PRN Rash and/or Itching  morphine  - Injectable 2 milliGRAM(s) IV Push every 6 hours PRN Severe Pain (7 - 10)  ondansetron Injectable 4 milliGRAM(s) IV Push every 8 hours PRN Nausea and/or Vomiting  sodium chloride 0.65% Nasal 1 Spray(s) Both Nostrils three times a day PRN Nasal Congestion  sodium chloride 0.9% lock flush 10 milliLiter(s) IV Push every 1 hour PRN Pre/post blood products, medications, blood draw, and to maintain line patency    Pertinent Labs: 06-10 Na139 mmol/L Glu 297 mg/dL<H> K+ 3.5 mmol/L Cr  1.30 mg/dL BUN 20 mg/dL 06-10 Alb 2.5 g/dL<L>    CAPILLARY BLOOD GLUCOSE  POCT Blood Glucose.: 210 mg/dL (10 Paul 2024 11:40)  POCT Blood Glucose.: 310 mg/dL (10 Paul 2024 07:46)  POCT Blood Glucose.: 290 mg/dL (09 Jun 2024 21:06)  POCT Blood Glucose.: 283 mg/dL (09 Jun 2024 16:49)    Skin: R heel wound, stage II R buttocks    Estimated Needs:   [X] no change since previous assessment: based on # 86.1kg 20 - 25kcals /kg 1722-2152kcals and 1.2-1.4 gms protein/kg 103-120gms protein  [ ] recalculated:     Previous Nutrition Diagnosis:   [ ] Inadequate Energy Intake [ ]Inadequate Oral Intake [ ] Excessive Energy Intake   [ ] Underweight [X] Increased Nutrient Needs [ ] Overweight/Obesity   [ ] Altered GI Function [ ] Unintended Weight Loss [ ] Food & Nutrition Related Knowledge Deficit [ ] Malnutrition     Nutrition Diagnosis is [X] ongoing  [ ] resolved [ ] not applicable     New Nutrition Diagnosis: [X] not applicable     Interventions:   Recommend  [ ] Change Diet To:  [X] Nutrition Supplement: ensure max protein shake BID (pending verification)  [ ] Nutrition Support  [X] Other: Continue consistent carbohydrate diet; assistance/encouragement at meal times as needed  Continue MVI and Vit C supplementation    Monitoring and Evaluation:   [X] PO intake [ x ] Tolerance to diet prescription [ x ] weights [ x ] labs[ x ] follow up per protocol  [X] other: s/s GI distress, bowel function, skin integrity/ edema

## 2024-06-10 NOTE — PROGRESS NOTE ADULT - SUBJECTIVE AND OBJECTIVE BOX
[facility] -- Brittany Lutz, Reynaldo Sweeney Savella, , Gera Maya  Office # [phone]    Follow Up:  AFib, CAD, Cardiac Optimization    Subjective/Observations: c/o postop pain with some relief from Pain med.  Remains comfortable on RA.  Denies any form of respiratory or cardiac discomfort.  However, still c/o pruritus    REVIEW OF SYSTEMS: All other review of systems is negative unless indicated above  PAST MEDICAL & SURGICAL HISTORY:  Diabetes  Diabetes mellitus with no complication  Afib  Hypertension  BPH (benign prostatic hyperplasia)  Perforated gastric ulcer  s/p emergent ex-lap omentopexy and plication 6/2019  Pulmonary embolism  History of non-ST elevation myocardial infarction (NSTEMI)  Osteomyelitis  s/p debridement  CAD S/P percutaneous coronary angioplasty  Cerebrovascular accident  H/O abdominal surgery  Perforated gastric ulcer  Traumatic amputation of left foot, initial encounter    MEDICATIONS  (STANDING):  apixaban 5 milliGRAM(s) Oral every 12 hours  ascorbic acid 500 milliGRAM(s) Oral daily  aspirin enteric coated 81 milliGRAM(s) Oral daily  atorvastatin 40 milliGRAM(s) Oral at bedtime  chlorhexidine 4% Liquid 1 Application(s) Topical <User Schedule>  cholecalciferol 1000 Unit(s) Oral daily  dextrose 50% Injectable 25 Gram(s) IV Push once  dextrose 50% Injectable 12.5 Gram(s) IV Push once  dextrose Oral Gel 15 Gram(s) Oral once  diltiazem    milliGRAM(s) Oral daily  ferrous    sulfate 325 milliGRAM(s) Oral two times a day  gabapentin 300 milliGRAM(s) Oral every 12 hours  glucagon  Injectable 1 milliGRAM(s) IntraMuscular once  insulin glargine Injectable (LANTUS) 10 Unit(s) SubCutaneous every morning  insulin lispro (ADMELOG) corrective regimen sliding scale   SubCutaneous three times a day before meals  insulin lispro (ADMELOG) corrective regimen sliding scale   SubCutaneous at bedtime  insulin lispro Injectable (ADMELOG) 3 Unit(s) SubCutaneous three times a day before meals  loratadine 10 milliGRAM(s) Oral daily  melatonin 5 milliGRAM(s) Oral at bedtime  metoclopramide Injectable 5 milliGRAM(s) IV Push three times a day  metoprolol succinate  milliGRAM(s) Oral daily  multivitamin/minerals 1 Tablet(s) Oral daily  naloxegol 25 milliGRAM(s) Oral daily  oxybutynin 10 milliGRAM(s) Oral two times a day  oxyCODONE    IR 10 milliGRAM(s) Oral two times a day  pantoprazole    Tablet 40 milliGRAM(s) Oral before breakfast  polyethylene glycol 3350 17 Gram(s) Oral daily  potassium chloride    Tablet ER 20 milliEquivalent(s) Oral three times a day  senna 2 Tablet(s) Oral at bedtime  sucralfate 1 Gram(s) Oral two times a day  tiotropium 2.5 MICROgram(s) Inhaler 2 Puff(s) Inhalation daily  torsemide 20 milliGRAM(s) Oral two times a day  vancomycin  IVPB 1000 milliGRAM(s) IV Intermittent every 12 hours    MEDICATIONS  (PRN):  acetaminophen     Tablet .. 650 milliGRAM(s) Oral every 6 hours PRN Temp greater or equal to 38C (100.4F), Mild Pain (1 - 3)  aluminum hydroxide/magnesium hydroxide/simethicone Suspension 30 milliLiter(s) Oral every 4 hours PRN Dyspepsia  diphenhydrAMINE 25 milliGRAM(s) Oral every 4 hours PRN Rash and/or Itching  morphine  - Injectable 2 milliGRAM(s) IV Push every 6 hours PRN Severe Pain (7 - 10)  ondansetron Injectable 4 milliGRAM(s) IV Push every 8 hours PRN Nausea and/or Vomiting  sodium chloride 0.65% Nasal 1 Spray(s) Both Nostrils three times a day PRN Nasal Congestion  sodium chloride 0.9% lock flush 10 milliLiter(s) IV Push every 1 hour PRN Pre/post blood products, medications, blood draw, and to maintain line patency    Allergies    No Known Drug Allergies  fish (Hives)    Intolerances    Vital Signs Last 24 Hrs  T(C): 36.7 (10 Paul 2024 12:00), Max: 37.1 (09 Jun 2024 20:27)  T(F): 98 (10 Paul 2024 12:00), Max: 98.8 (09 Jun 2024 20:27)  HR: 81 (10 Paul 2024 12:00) (75 - 88)  BP: 116/78 (10 Paul 2024 12:00) (110/68 - 132/78)  BP(mean): --  RR: 20 (10 Paul 2024 12:00) (18 - 20)  SpO2: 95% (10 Paul 2024 12:00) (94% - 95%)    Parameters below as of 10 Paul 2024 12:00  Patient On (Oxygen Delivery Method): room air  I&O's Summary    09 Jun 2024 07:01  -  10 Paul 2024 07:00  --------------------------------------------------------  IN: 0 mL / OUT: 2125 mL / NET: -2125 mL    10 Paul 2024 07:01  -  10 Paul 2024 12:33  --------------------------------------------------------  IN: 120 mL / OUT: 0 mL / NET: 120 mL    PHYSICAL EXAM:  TELE: Not on tele  Constitutional: NAD, awake and alert, obese  HEENT: Moist Mucous Membranes, Anicteric  Pulmonary: Non-labored, breath sounds are clear bilaterally, No wheezing, rales or rhonchi  Cardiovascular: IRRR, S1 and S2, No murmurs, rubs, gallops or clicks  Gastrointestinal: Bowel Sounds present, soft, nontender.   Lymph: No peripheral edema. No lymphadenopathy.  Skin: +generalized rashes.  B/L foot ulcers with dressing dry and intact  Psych:  Mood & affect appropriate  LABS: All Labs Reviewed:                        12.6   12.51 )-----------( 286      ( 10 Paul 2024 06:42 )             40.8                         13.3   11.16 )-----------( 280      ( 08 Jun 2024 06:50 )             42.1                         12.9   12.07 )-----------( 289      ( 07 Jun 2024 12:50 )             39.3     10 Paul 2024 06:42    139    |  100    |  20     ----------------------------<  297    3.5     |  32     |  1.30     Ca    9.1        10 Paul 2024 06:42  Mg     1.9       10 Paul 2024 06:42    TPro  6.7    /  Alb  2.5    /  TBili  0.6    /  DBili  x      /  AST  17     /  ALT  16     /  AlkPhos  107    10 Apul 2024 06:42          12 Lead ECG:   Ventricular Rate 127 BPM    QRS Duration 82 ms    Q-T Interval 276 ms    QTC Calculation(Bazett) 401 ms    R Axis 13 degrees    T Axis 5 degrees    Diagnosis Line Atrial fibrillation with rapid ventricular response with premature ventricular or aberrantly conducted complexes  Low voltage QRS  Cannot rule out Anterior infarct , age undetermined  Abnormal ECG    Confirmed by darien France ([id number]) on 5/28/2024 2:53:37 PM (05-28-24 @ 11:53)      TRANSTHORACIC ECHOCARDIOGRAM REPORT  ________________________________________________________________________________                                      _______       Pt. Name:       SARAH MORRISON Study Date:    3/18/2024  MRN:            [id number]         YOB: 1970  Accession #:    [id number]        Age:           53 years  Account#:       [id number]       Gender:        M  Heart Rate:                      Height:        74.02 in (188.00 cm)  Rhythm:                          Weight:        244.71 lb (111.00 kg)  Blood Pressure: 100/60 mmHg      BSA/BMI:       2.37 m² / 31.41 kg/m²  ________________________________________________________________________________________  Referring Physician:    [id number] Hill Brizuela  Interpreting Physician: Mary Maya MD  Primary Sonographer:    Mounika FELDMAN    CPT:               ECHO TTE WO CON COMP W DOPP - 22830.m  Indication(s):     Dyspnea, unspecified - R06.00  Procedure:         Transthoracic echocardiogram with 2-D, M-mode and complete                     spectral and color flow Doppler.  Ordering Location: [facility]  Admission Status:  Inpatient  Study Information: Image quality for this study is technically difficult.    _______________________________________________________________________________________     CONCLUSIONS:      1. Technically difficult image quality.   2. Left ventricular cavity is normal in size. Left ventricular wall thickness is normal. Left ventricular systolic function is normal with an ejection fraction visually estimated at 50 to 55 %.   3. Normal right ventricular cavity size, with normal wall thickness, and mildly reduced systolic function.   4. The left atrium is moderately dilated.   5. The right atrium is moderately dilated.   6. Moderate to severe mitral regurgitation.   7. Mild to moderate tricuspid regurgitation.   8. Estimated pulmonary artery systolic pressure is 36 mmHg, consistent with mild pulmonary hypertension.   9. The inferior vena cava is dilated measuring 2.40 cm in diameter, (dilated >2.1cm) with normal inspiratory collapse (normal >50%) consistent with mildly elevated right atrial pressure (~8, range 5-10mmHg).  10. Trace pericardial effusion.    ________________________________________________________________________________________  FINDINGS:     Left Ventricle:  The left ventricular cavity is normal in size. Left ventricular wall thickness is normal. Left ventricular systolic function is normal with an ejection fraction visually estimated at 50 to 55%.The left ventricular diastolic function is indeterminate.     Right Ventricle:  The right ventricular cavity is normal in size, with normal wall thickness and mildly reduced systolic function.     Left Atrium:  The left atrium is moderately dilated.     Right Atrium:  The right atrium is moderately dilated.     Aortic Valve:  The aortic valve anatomy cannot be determined with normal systolic excursion. There is no aortic valve stenosis.     Mitral Valve:  There is mild calcification of the mitralvalve annulus. There is moderate to severe mitral regurgitation.     Tricuspid Valve:  Structurally normal tricuspid valve with normal leaflet excursion. There is mild to moderate tricuspid regurgitation. Estimated pulmonary artery systolic pressure is 36 mmHg, consistent with mild pulmonary hypertension.     Pulmonic Valve:  The pulmonic valve was not well visualized. There is trace pulmonic regurgitation.     Pericardium:  There is a trace pericardial effusion.     Systemic Veins:  The inferiorvena cava is dilated measuring 2.40 cm in diameter, (dilated >2.1cm) with normal inspiratory collapse (normal >50%) consistent with mildly elevated right atrial pressure (~8, range 5-10mmHg).  ____________________________________________________________________  QUANTITATIVE DATA:  Left Ventricle Measurements: (Indexed to BSA)     IVSd (2D):   1.9 cm  LVPWd (2D):  1.5 cm  LVIDd (2D):  5.3 cm  LVIDs (2D):  3.9 cm  LV Mass:     413 g  174.4 g/m²  Visualized LV EF%: 50 to 55%     MV E Vmax:    1.15 m/s  e' lateral:   10.90 cm/s  e' medial:    10.50 cm/s  E/e' lateral: 10.56  E/e' medial:  12.00  E/e' Average: 10.76  MV DT:        137 msec    Aorta Measurements: (Normal range) (Indexed to BSA)     Sinuses of Valsalva: 3.50 cm (3.1 - 3.7 cm)       Left Atrium Measurements: (Indexed to BSA)  LA Diam 2D: 3.99 cm       LVOT / RVOT/ Qp/Qs Data: (Indexed to BSA)  LVOT Diameter: 2.22 cm    Mitral Valve Measurements:     MV E Vmax: 1.2 m/s       Tricuspid Valve Measurements:     TR Vmax:          2.6m/s  TR Peak Gradient: 27.7 mmHg  RA Pressure:      8 mmHg  PASP:             36 mmHg    ________________________________________________________________________________________  Electronically signed on 3/19/2024 at 11:27:59 AM by Mary Maya MD         *** Final ***      [facility] -- Brittany Lutz, Reynaldo Sweeney Savella, , Gera Maya  Office # [phone]    Follow Up:  AFib, CAD, Cardiac Optimization    Subjective/Observations: c/o postop pain with some relief from Pain med.  Remains comfortable on RA.  Denies any form of respiratory or cardiac discomfort.  However, still c/o pruritus    REVIEW OF SYSTEMS: All other review of systems is negative unless indicated above  PAST MEDICAL & SURGICAL HISTORY:  Diabetes  Diabetes mellitus with no complication  Afib  Hypertension  BPH (benign prostatic hyperplasia)  Perforated gastric ulcer  s/p emergent ex-lap omentopexy and plication 6/2019  Pulmonary embolism  History of non-ST elevation myocardial infarction (NSTEMI)  Osteomyelitis  s/p debridement  CAD S/P percutaneous coronary angioplasty  Cerebrovascular accident  H/O abdominal surgery  Perforated gastric ulcer  Traumatic amputation of left foot, initial encounter    MEDICATIONS  (STANDING):  apixaban 5 milliGRAM(s) Oral every 12 hours  ascorbic acid 500 milliGRAM(s) Oral daily  aspirin enteric coated 81 milliGRAM(s) Oral daily  atorvastatin 40 milliGRAM(s) Oral at bedtime  chlorhexidine 4% Liquid 1 Application(s) Topical <User Schedule>  cholecalciferol 1000 Unit(s) Oral daily  dextrose 50% Injectable 25 Gram(s) IV Push once  dextrose 50% Injectable 12.5 Gram(s) IV Push once  dextrose Oral Gel 15 Gram(s) Oral once  diltiazem    milliGRAM(s) Oral daily  ferrous    sulfate 325 milliGRAM(s) Oral two times a day  gabapentin 300 milliGRAM(s) Oral every 12 hours  glucagon  Injectable 1 milliGRAM(s) IntraMuscular once  insulin glargine Injectable (LANTUS) 10 Unit(s) SubCutaneous every morning  insulin lispro (ADMELOG) corrective regimen sliding scale   SubCutaneous three times a day before meals  insulin lispro (ADMELOG) corrective regimen sliding scale   SubCutaneous at bedtime  insulin lispro Injectable (ADMELOG) 3 Unit(s) SubCutaneous three times a day before meals  loratadine 10 milliGRAM(s) Oral daily  melatonin 5 milliGRAM(s) Oral at bedtime  metoclopramide Injectable 5 milliGRAM(s) IV Push three times a day  metoprolol succinate  milliGRAM(s) Oral daily  multivitamin/minerals 1 Tablet(s) Oral daily  naloxegol 25 milliGRAM(s) Oral daily  oxybutynin 10 milliGRAM(s) Oral two times a day  oxyCODONE    IR 10 milliGRAM(s) Oral two times a day  pantoprazole    Tablet 40 milliGRAM(s) Oral before breakfast  polyethylene glycol 3350 17 Gram(s) Oral daily  potassium chloride    Tablet ER 20 milliEquivalent(s) Oral three times a day  senna 2 Tablet(s) Oral at bedtime  sucralfate 1 Gram(s) Oral two times a day  tiotropium 2.5 MICROgram(s) Inhaler 2 Puff(s) Inhalation daily  torsemide 20 milliGRAM(s) Oral two times a day  vancomycin  IVPB 1000 milliGRAM(s) IV Intermittent every 12 hours    MEDICATIONS  (PRN):  acetaminophen     Tablet .. 650 milliGRAM(s) Oral every 6 hours PRN Temp greater or equal to 38C (100.4F), Mild Pain (1 - 3)  aluminum hydroxide/magnesium hydroxide/simethicone Suspension 30 milliLiter(s) Oral every 4 hours PRN Dyspepsia  diphenhydrAMINE 25 milliGRAM(s) Oral every 4 hours PRN Rash and/or Itching  morphine  - Injectable 2 milliGRAM(s) IV Push every 6 hours PRN Severe Pain (7 - 10)  ondansetron Injectable 4 milliGRAM(s) IV Push every 8 hours PRN Nausea and/or Vomiting  sodium chloride 0.65% Nasal 1 Spray(s) Both Nostrils three times a day PRN Nasal Congestion  sodium chloride 0.9% lock flush 10 milliLiter(s) IV Push every 1 hour PRN Pre/post blood products, medications, blood draw, and to maintain line patency    Allergies    No Known Drug Allergies  fish (Hives)    Intolerances    Vital Signs Last 24 Hrs  T(C): 36.7 (10 Paul 2024 12:00), Max: 37.1 (09 Jun 2024 20:27)  T(F): 98 (10 Paul 2024 12:00), Max: 98.8 (09 Jun 2024 20:27)  HR: 81 (10 Paul 2024 12:00) (75 - 88)  BP: 116/78 (10 Paul 2024 12:00) (110/68 - 132/78)  BP(mean): --  RR: 20 (10 Paul 2024 12:00) (18 - 20)  SpO2: 95% (10 Paul 2024 12:00) (94% - 95%)    Parameters below as of 10 Paul 2024 12:00  Patient On (Oxygen Delivery Method): room air  I&O's Summary    09 Jun 2024 07:01  -  10 Paul 2024 07:00  --------------------------------------------------------  IN: 0 mL / OUT: 2125 mL / NET: -2125 mL    10 Paul 2024 07:01  -  10 Paul 2024 12:33  --------------------------------------------------------  IN: 120 mL / OUT: 0 mL / NET: 120 mL    PHYSICAL EXAM:  TELE: Not on tele  Constitutional: NAD, awake and alert, obese  HEENT: Moist Mucous Membranes, Anicteric  Pulmonary: Non-labored, breath sounds are clear bilaterally, No wheezing, rales or rhonchi  Cardiovascular: IRRR, S1 and S2, No murmurs, rubs, gallops or clicks  Gastrointestinal: Bowel Sounds present, soft, nontender.   Lymph: No peripheral edema. No lymphadenopathy.  Skin: +generalized rashes.  B/L foot ulcers with dressing dry and intact  Psych:  Mood & affect appropriate  LABS: All Labs Reviewed:                        12.6   12.51 )-----------( 286      ( 10 Paul 2024 06:42 )             40.8                         13.3   11.16 )-----------( 280      ( 08 Jun 2024 06:50 )             42.1                         12.9   12.07 )-----------( 289      ( 07 Jun 2024 12:50 )             39.3     10 Paul 2024 06:42    139    |  100    |  20     ----------------------------<  297    3.5     |  32     |  1.30     Ca    9.1        10 Paul 2024 06:42  Mg     1.9       10 Paul 2024 06:42    TPro  6.7    /  Alb  2.5    /  TBili  0.6    /  DBili  x      /  AST  17     /  ALT  16     /  AlkPhos  107    10 Paul 2024 06:42          12 Lead ECG:   Ventricular Rate 127 BPM    QRS Duration 82 ms    Q-T Interval 276 ms    QTC Calculation(Bazett) 401 ms    R Axis 13 degrees    T Axis 5 degrees    Diagnosis Line Atrial fibrillation with rapid ventricular response with premature ventricular or aberrantly conducted complexes  Low voltage QRS  Cannot rule out Anterior infarct , age undetermined  Abnormal ECG    Confirmed by darien France ([id number]) on 5/28/2024 2:53:37 PM (05-28-24 @ 11:53)      TRANSTHORACIC ECHOCARDIOGRAM REPORT  ________________________________________________________________________________                                      _______       Pt. Name:       SARAH MORRISON Study Date:    3/18/2024  MRN:            [id number]         YOB: 1970  Accession #:    [id number]        Age:           53 years  Account#:       [id number]       Gender:        M  Heart Rate:                      Height:        74.02 in (188.00 cm)  Rhythm:                          Weight:        244.71 lb (111.00 kg)  Blood Pressure: 100/60 mmHg      BSA/BMI:       2.37 m² / 31.41 kg/m²  ________________________________________________________________________________________  Referring Physician:    [id number] Hill Brizuela  Interpreting Physician: Mary Maya MD  Primary Sonographer:    Mounika FELDMAN    CPT:               ECHO TTE WO CON COMP W DOPP - 22873.m  Indication(s):     Dyspnea, unspecified - R06.00  Procedure:         Transthoracic echocardiogram with 2-D, M-mode and complete                     spectral and color flow Doppler.  Ordering Location: [facility]  Admission Status:  Inpatient  Study Information: Image quality for this study is technically difficult.    _______________________________________________________________________________________     CONCLUSIONS:      1. Technically difficult image quality.   2. Left ventricular cavity is normal in size. Left ventricular wall thickness is normal. Left ventricular systolic function is normal with an ejection fraction visually estimated at 50 to 55 %.   3. Normal right ventricular cavity size, with normal wall thickness, and mildly reduced systolic function.   4. The left atrium is moderately dilated.   5. The right atrium is moderately dilated.   6. Moderate to severe mitral regurgitation.   7. Mild to moderate tricuspid regurgitation.   8. Estimated pulmonary artery systolic pressure is 36 mmHg, consistent with mild pulmonary hypertension.   9. The inferior vena cava is dilated measuring 2.40 cm in diameter, (dilated >2.1cm) with normal inspiratory collapse (normal >50%) consistent with mildly elevated right atrial pressure (~8, range 5-10mmHg).  10. Trace pericardial effusion.    ________________________________________________________________________________________  FINDINGS:     Left Ventricle:  The left ventricular cavity is normal in size. Left ventricular wall thickness is normal. Left ventricular systolic function is normal with an ejection fraction visually estimated at 50 to 55%.The left ventricular diastolic function is indeterminate.     Right Ventricle:  The right ventricular cavity is normal in size, with normal wall thickness and mildly reduced systolic function.     Left Atrium:  The left atrium is moderately dilated.     Right Atrium:  The right atrium is moderately dilated.     Aortic Valve:  The aortic valve anatomy cannot be determined with normal systolic excursion. There is no aortic valve stenosis.     Mitral Valve:  There is mild calcification of the mitralvalve annulus. There is moderate to severe mitral regurgitation.     Tricuspid Valve:  Structurally normal tricuspid valve with normal leaflet excursion. There is mild to moderate tricuspid regurgitation. Estimated pulmonary artery systolic pressure is 36 mmHg, consistent with mild pulmonary hypertension.     Pulmonic Valve:  The pulmonic valve was not well visualized. There is trace pulmonic regurgitation.     Pericardium:  There is a trace pericardial effusion.     Systemic Veins:  The inferiorvena cava is dilated measuring 2.40 cm in diameter, (dilated >2.1cm) with normal inspiratory collapse (normal >50%) consistent with mildly elevated right atrial pressure (~8, range 5-10mmHg).  ____________________________________________________________________  QUANTITATIVE DATA:  Left Ventricle Measurements: (Indexed to BSA)     IVSd (2D):   1.9 cm  LVPWd (2D):  1.5 cm  LVIDd (2D):  5.3 cm  LVIDs (2D):  3.9 cm  LV Mass:     413 g  174.4 g/m²  Visualized LV EF%: 50 to 55%     MV E Vmax:    1.15 m/s  e' lateral:   10.90 cm/s  e' medial:    10.50 cm/s  E/e' lateral: 10.56  E/e' medial:  12.00  E/e' Average: 10.76  MV DT:        137 msec    Aorta Measurements: (Normal range) (Indexed to BSA)     Sinuses of Valsalva: 3.50 cm (3.1 - 3.7 cm)       Left Atrium Measurements: (Indexed to BSA)  LA Diam 2D: 3.99 cm       LVOT / RVOT/ Qp/Qs Data: (Indexed to BSA)  LVOT Diameter: 2.22 cm    Mitral Valve Measurements:     MV E Vmax: 1.2 m/s       Tricuspid Valve Measurements:     TR Vmax:          2.6m/s  TR Peak Gradient: 27.7 mmHg  RA Pressure:      8 mmHg  PASP:             36 mmHg    ________________________________________________________________________________________  Electronically signed on 3/19/2024 at 11:27:59 AM by Mary Maya MD         *** Final ***

## 2024-06-10 NOTE — PROGRESS NOTE ADULT - SUBJECTIVE AND OBJECTIVE BOX
[facility] DIV[facility] of INFECTIOUS DISEASE  Juventino Whitten MD PhD, Symone Hickey MD, Anne-Marie Li MD, Jeffery Jacobs MD, Ryan Romeo MD  and providing coverage with Melody Armstrong MD  Providing Infectious Disease Consultations at [facility], [facility], [facility]'s    Office# [phone] to schedule follow up appointments  Answering Service for urgent calls or New Consults [phone]  Cell# to text for urgent issues Juventino Whitten [phone]     infectious diseases progress note:    SARAH MORRISON is a 53y y. o. Male patient    Overnight and events of the last 24hrs reviewed    Allergies    No Known Drug Allergies  fish (Hives)    Intolerances        ANTIBIOTICS/RELEVANT:  antimicrobials  vancomycin  IVPB 1000 milliGRAM(s) IV Intermittent every 12 hours    immunologic:    OTHER:  acetaminophen     Tablet .. 650 milliGRAM(s) Oral every 6 hours PRN  aluminum hydroxide/magnesium hydroxide/simethicone Suspension 30 milliLiter(s) Oral every 4 hours PRN  apixaban 5 milliGRAM(s) Oral every 12 hours  ascorbic acid 500 milliGRAM(s) Oral daily  aspirin enteric coated 81 milliGRAM(s) Oral daily  atorvastatin 40 milliGRAM(s) Oral at bedtime  chlorhexidine 4% Liquid 1 Application(s) Topical <User Schedule>  cholecalciferol 1000 Unit(s) Oral daily  dextrose 50% Injectable 12.5 Gram(s) IV Push once  dextrose 50% Injectable 25 Gram(s) IV Push once  dextrose Oral Gel 15 Gram(s) Oral once  diltiazem    milliGRAM(s) Oral daily  diphenhydrAMINE 25 milliGRAM(s) Oral every 4 hours PRN  ferrous    sulfate 325 milliGRAM(s) Oral two times a day  gabapentin 300 milliGRAM(s) Oral every 12 hours  glucagon  Injectable 1 milliGRAM(s) IntraMuscular once  insulin glargine Injectable (LANTUS) 10 Unit(s) SubCutaneous every morning  insulin lispro (ADMELOG) corrective regimen sliding scale   SubCutaneous three times a day before meals  insulin lispro (ADMELOG) corrective regimen sliding scale   SubCutaneous at bedtime  insulin lispro Injectable (ADMELOG) 3 Unit(s) SubCutaneous three times a day before meals  loratadine 10 milliGRAM(s) Oral daily  melatonin 5 milliGRAM(s) Oral at bedtime  metoclopramide Injectable 5 milliGRAM(s) IV Push three times a day  metoprolol succinate  milliGRAM(s) Oral daily  morphine  - Injectable 2 milliGRAM(s) IV Push every 6 hours PRN  multivitamin/minerals 1 Tablet(s) Oral daily  naloxegol 25 milliGRAM(s) Oral daily  ondansetron Injectable 4 milliGRAM(s) IV Push every 8 hours PRN  oxybutynin 10 milliGRAM(s) Oral two times a day  oxyCODONE    IR 10 milliGRAM(s) Oral two times a day  pantoprazole    Tablet 40 milliGRAM(s) Oral before breakfast  polyethylene glycol 3350 17 Gram(s) Oral daily  potassium chloride    Tablet ER 20 milliEquivalent(s) Oral three times a day  senna 2 Tablet(s) Oral at bedtime  sodium chloride 0.65% Nasal 1 Spray(s) Both Nostrils three times a day PRN  sodium chloride 0.9% lock flush 10 milliLiter(s) IV Push every 1 hour PRN  sucralfate 1 Gram(s) Oral two times a day  tiotropium 2.5 MICROgram(s) Inhaler 2 Puff(s) Inhalation daily  torsemide 20 milliGRAM(s) Oral two times a day      Objective:  Vital Signs Last 24 Hrs  T(C): 37.1 (10 Paul 2024 05:23), Max: 37.2 (09 Jun 2024 11:50)  T(F): 98.7 (10 Paul 2024 05:23), Max: 99 (09 Jun 2024 11:50)  HR: 75 (10 Paul 2024 05:23) (75 - 90)  BP: 110/68 (10 Paul 2024 05:23) (110/68 - 132/78)  BP(mean): --  RR: 18 (10 Paul 2024 05:23) (18 - 18)  SpO2: 95% (10 Paul 2024 05:23) (94% - 95%)    Parameters below as of 10 Paul 2024 05:23  Patient On (Oxygen Delivery Method): room air        T(C): 37.1 (06-10-24 @ 05:23), Max: 37.2 (06-08-24 @ 19:48)  T(C): 37.1 (06-10-24 @ 05:23), Max: 37.2 (06-08-24 @ 19:48)  T(C): 37.1 (06-10-24 @ 05:23), Max: 37.2 (06-08-24 @ 19:48)    PHYSICAL EXAM:  HEENT: NC atraumatic  Neck: supple  Respiratory: no accessory muscle use, breathing comfortably  Cardiovascular: distant  Gastrointestinal: normal appearing, nondistended  Extremities: no clubbing, no cyanosis,        LABS:                          12.6   12.51 )-----------( 286      ( 10 Paul 2024 06:42 )             40.8       WBC  12.51 06-10 @ 06:42  11.16 06-08 @ 06:50  12.07 06-07 @ 12:50  11.76 06-06 @ 07:30  11.30 06-04 @ 09:05      06-10    139  |  100  |  20  ----------------------------<  297<H>  3.5   |  32<H>  |  1.30    Ca    9.1      10 Paul 2024 06:42  Mg     1.9     06-10    TPro  6.7  /  Alb  2.5<L>  /  TBili  0.6  /  DBili  x   /  AST  17  /  ALT  16  /  AlkPhos  107  06-10      Creatinine: 1.30 mg/dL (06-10-24 @ 06:42)  Creatinine: 0.98 mg/dL (06-06-24 @ 07:30)  Creatinine: 0.95 mg/dL (06-04-24 @ 09:05)        Urinalysis Basic - ( 10 Paul 2024 06:42 )    Color: x / Appearance: x / SG: x / pH: x  Gluc: 297 mg/dL / Ketone: x  / Bili: x / Urobili: x   Blood: x / Protein: x / Nitrite: x   Leuk Esterase: x / RBC: x / WBC x   Sq Epi: x / Non Sq Epi: x / Bacteria: x            INFLAMMATORY MARKERS      MICROBIOLOGY:              RADIOLOGY & ADDITIONAL STUDIES:

## 2024-06-10 NOTE — PROGRESS NOTE ADULT - ASSESSMENT
Pt is a 53M w/ PMHx of Afib, CAD, DM presenting for R heel ulcer debridement.   S/p zosyn x7 day course at facility  admitted for debridement  Prior WCx MRSA/E. faecalis from 3/26/24  6/3 Now w/ uptrending WBC and drainage from wound   6/4 S/p Selective debridement of wound 04-Jun-2024 10:52:57  Parviz Todd    6/3 WCx   Numerous Staphylococcus aureus  6/4  TCx MRSA, Escherichia coli ESBL  Right calcaneus bone pathology:  -   Fragments of bone with chronic osteomyelitis.  -   Focal acute osteomyelitis cannot be ruled out.    Recommendations:   C/w vancomycin, goal trough 15-20, dosing per pharmacy protocol and   -with inability to rule out osteo rec 6 weeks so last day 7/18  weekly cbc, CMP, ESR  ordered PICC  Appreciate podiatry/wound care    - now with bullous lesions allergic due to antibx v autoimmune   d/w pt rash has been present for 9 months states had bx and was told contact dermatitis but is disseminated looks more like psoriasis   but the bullae new and there are flacid bullae. Timing is such that this started after start of ertapenem so stopped ertapenem today and rec obs off  no great options to cover ESBL but MRSA might be     Thank you for consulting us and involving us in the management of this most interesting and challenging case.  We will follow along in the care of this patient. Please call us at [phone] or text me directly on my cell# at [phone] with any concerns.

## 2024-06-10 NOTE — PROGRESS NOTE ADULT - ASSESSMENT
53M w/ PMHx of Afib, CAD s/p stents, CVA, DM, HTN, pulmonary embolism on Eliquis,  osteomyelitis s/p debridement presenting  presents to emergency department from [facility] for R heel ulcer debridement.    Afib, CAD, stents, HTN, PE on ELiquis  - Known  Afib, initially RVR but now rate-controlled   - Continue cardizem 360mg CD and  Toprol 100 mg daily   - No need for Digoxin as maintenance  - Continue Eliquis for AC    - 3/18/24: Technically difficult ECHO w/ normal LV size and function EF 50-55%, mildly reduced RV systolic function, mod dilated LA and RA, mod to sev MR, mild to mod TR, PASP 36  - Euvolemic on exam with no O2 requirement  - Continue torsemide PO q12H  - BP stable and controlled     - EKG showed A fib RVR, PVC @ 127.   - No evidence of any active ischemia   - Continue statin and ASA    - S/p surgical debridement of the right heel with bone biopsy, 6/4  - Tolerated procedure well, cardiac status optimal post operatively   - Pain control per Primary    - Monitor and replete lytes, keep K>4, Mg>2.  - Will continue to follow.    Henrietta Kim DNP, NP-C, AGACNP-C  Cardiology   Call TEAMS

## 2024-06-11 LAB
-  CLINDAMYCIN: SIGNIFICANT CHANGE UP
-  DAPTOMYCIN: SIGNIFICANT CHANGE UP
-  ERYTHROMYCIN: SIGNIFICANT CHANGE UP
-  GENTAMICIN: SIGNIFICANT CHANGE UP
-  LINEZOLID: SIGNIFICANT CHANGE UP
-  OXACILLIN: SIGNIFICANT CHANGE UP
-  PENICILLIN: SIGNIFICANT CHANGE UP
-  RIFAMPIN: SIGNIFICANT CHANGE UP
-  TETRACYCLINE: SIGNIFICANT CHANGE UP
-  TRIMETHOPRIM/SULFAMETHOXAZOLE: SIGNIFICANT CHANGE UP
-  VANCOMYCIN: SIGNIFICANT CHANGE UP
CULTURE RESULTS: ABNORMAL
GLUCOSE BLDC GLUCOMTR-MCNC: 350 MG/DL — HIGH (ref 70–99)
GLUCOSE BLDC GLUCOMTR-MCNC: 358 MG/DL — HIGH (ref 70–99)
GLUCOSE BLDC GLUCOMTR-MCNC: 363 MG/DL — HIGH (ref 70–99)
GLUCOSE BLDC GLUCOMTR-MCNC: 371 MG/DL — HIGH (ref 70–99)
GLUCOSE BLDC GLUCOMTR-MCNC: 395 MG/DL — HIGH (ref 70–99)
HCT VFR BLD CALC: 37.9 % — LOW (ref 39–50)
HGB BLD-MCNC: 12.6 G/DL — LOW (ref 13–17)
MCHC RBC-ENTMCNC: 30.7 PG — SIGNIFICANT CHANGE UP (ref 27–34)
MCHC RBC-ENTMCNC: 33.2 GM/DL — SIGNIFICANT CHANGE UP (ref 32–36)
MCV RBC AUTO: 92.4 FL — SIGNIFICANT CHANGE UP (ref 80–100)
METHOD TYPE: SIGNIFICANT CHANGE UP
NRBC # BLD: 0 /100 WBCS — SIGNIFICANT CHANGE UP (ref 0–0)
ORGANISM # SPEC MICROSCOPIC CNT: ABNORMAL
ORGANISM # SPEC MICROSCOPIC CNT: ABNORMAL
ORGANISM # SPEC MICROSCOPIC CNT: SIGNIFICANT CHANGE UP
PLATELET # BLD AUTO: 307 K/UL — SIGNIFICANT CHANGE UP (ref 150–400)
RBC # BLD: 4.1 M/UL — LOW (ref 4.2–5.8)
RBC # FLD: 14.3 % — SIGNIFICANT CHANGE UP (ref 10.3–14.5)
SPECIMEN SOURCE: SIGNIFICANT CHANGE UP
VANCOMYCIN TROUGH SERPL-MCNC: 15.7 UG/ML — SIGNIFICANT CHANGE UP (ref 10–20)
WBC # BLD: 12.49 K/UL — HIGH (ref 3.8–10.5)
WBC # FLD AUTO: 12.49 K/UL — HIGH (ref 3.8–10.5)

## 2024-06-11 PROCEDURE — 99232 SBSQ HOSP IP/OBS MODERATE 35: CPT

## 2024-06-11 RX ORDER — VANCOMYCIN HCL 1 G
750 VIAL (EA) INTRAVENOUS EVERY 12 HOURS
Refills: 0 | Status: DISCONTINUED | OUTPATIENT
Start: 2024-06-11 | End: 2024-06-13

## 2024-06-11 RX ORDER — VANCOMYCIN HCL 1 G
750 VIAL (EA) INTRAVENOUS
Qty: 0 | Refills: 0 | DISCHARGE
Start: 2024-06-11

## 2024-06-11 RX ORDER — INSULIN GLARGINE 100 [IU]/ML
15 INJECTION, SOLUTION SUBCUTANEOUS EVERY MORNING
Refills: 0 | Status: DISCONTINUED | OUTPATIENT
Start: 2024-06-11 | End: 2024-06-13

## 2024-06-11 RX ADMIN — Medication 6: at 22:20

## 2024-06-11 RX ADMIN — Medication 10 MILLIGRAM(S): at 17:33

## 2024-06-11 RX ADMIN — INSULIN GLARGINE 15 UNIT(S): 100 INJECTION, SOLUTION SUBCUTANEOUS at 09:16

## 2024-06-11 RX ADMIN — ATORVASTATIN CALCIUM 40 MILLIGRAM(S): 80 TABLET, FILM COATED ORAL at 22:20

## 2024-06-11 RX ADMIN — Medication 25 MILLIGRAM(S): at 22:20

## 2024-06-11 RX ADMIN — APIXABAN 5 MILLIGRAM(S): 2.5 TABLET, FILM COATED ORAL at 17:34

## 2024-06-11 RX ADMIN — Medication 250 MILLIGRAM(S): at 19:32

## 2024-06-11 RX ADMIN — MORPHINE SULFATE 2 MILLIGRAM(S): 50 CAPSULE, EXTENDED RELEASE ORAL at 23:00

## 2024-06-11 RX ADMIN — Medication 1 GRAM(S): at 05:53

## 2024-06-11 RX ADMIN — Medication 20 MILLIEQUIVALENT(S): at 13:49

## 2024-06-11 RX ADMIN — APIXABAN 5 MILLIGRAM(S): 2.5 TABLET, FILM COATED ORAL at 05:53

## 2024-06-11 RX ADMIN — POLYETHYLENE GLYCOL 3350 17 GRAM(S): 17 POWDER, FOR SOLUTION ORAL at 12:28

## 2024-06-11 RX ADMIN — Medication 3 UNIT(S): at 08:24

## 2024-06-11 RX ADMIN — Medication 3 UNIT(S): at 17:34

## 2024-06-11 RX ADMIN — MORPHINE SULFATE 2 MILLIGRAM(S): 50 CAPSULE, EXTENDED RELEASE ORAL at 10:15

## 2024-06-11 RX ADMIN — GABAPENTIN 300 MILLIGRAM(S): 400 CAPSULE ORAL at 05:54

## 2024-06-11 RX ADMIN — NALOXEGOL OXALATE 25 MILLIGRAM(S): 12.5 TABLET, FILM COATED ORAL at 12:28

## 2024-06-11 RX ADMIN — Medication 20 MILLIEQUIVALENT(S): at 05:52

## 2024-06-11 RX ADMIN — Medication 20 MILLIGRAM(S): at 13:49

## 2024-06-11 RX ADMIN — Medication 50 MILLIGRAM(S): at 13:48

## 2024-06-11 RX ADMIN — PANTOPRAZOLE SODIUM 40 MILLIGRAM(S): 20 TABLET, DELAYED RELEASE ORAL at 05:53

## 2024-06-11 RX ADMIN — LORATADINE 10 MILLIGRAM(S): 10 TABLET ORAL at 12:27

## 2024-06-11 RX ADMIN — Medication 5 MILLIGRAM(S): at 19:59

## 2024-06-11 RX ADMIN — TIOTROPIUM BROMIDE 2 PUFF(S): 18 CAPSULE ORAL; RESPIRATORY (INHALATION) at 06:11

## 2024-06-11 RX ADMIN — Medication 10 MILLIGRAM(S): at 05:53

## 2024-06-11 RX ADMIN — Medication 5 MILLIGRAM(S): at 05:52

## 2024-06-11 RX ADMIN — Medication 1 GRAM(S): at 17:33

## 2024-06-11 RX ADMIN — CHLORHEXIDINE GLUCONATE 1 APPLICATION(S): 213 SOLUTION TOPICAL at 06:11

## 2024-06-11 RX ADMIN — OXYCODONE HYDROCHLORIDE 10 MILLIGRAM(S): 5 TABLET ORAL at 17:33

## 2024-06-11 RX ADMIN — Medication 3 UNIT(S): at 12:27

## 2024-06-11 RX ADMIN — OXYCODONE HYDROCHLORIDE 10 MILLIGRAM(S): 5 TABLET ORAL at 06:30

## 2024-06-11 RX ADMIN — Medication 20 MILLIEQUIVALENT(S): at 22:20

## 2024-06-11 RX ADMIN — Medication 325 MILLIGRAM(S): at 17:33

## 2024-06-11 RX ADMIN — Medication 5 MILLIGRAM(S): at 13:49

## 2024-06-11 RX ADMIN — Medication 360 MILLIGRAM(S): at 05:52

## 2024-06-11 RX ADMIN — Medication 100 MILLIGRAM(S): at 05:53

## 2024-06-11 RX ADMIN — Medication 8: at 12:26

## 2024-06-11 RX ADMIN — Medication 1000 UNIT(S): at 12:28

## 2024-06-11 RX ADMIN — MORPHINE SULFATE 2 MILLIGRAM(S): 50 CAPSULE, EXTENDED RELEASE ORAL at 09:33

## 2024-06-11 RX ADMIN — MORPHINE SULFATE 2 MILLIGRAM(S): 50 CAPSULE, EXTENDED RELEASE ORAL at 22:21

## 2024-06-11 RX ADMIN — SENNA PLUS 2 TABLET(S): 8.6 TABLET ORAL at 22:20

## 2024-06-11 RX ADMIN — Medication 5 MILLIGRAM(S): at 22:20

## 2024-06-11 RX ADMIN — Medication 25 MILLIGRAM(S): at 09:32

## 2024-06-11 RX ADMIN — Medication 1 TABLET(S): at 12:27

## 2024-06-11 RX ADMIN — Medication 325 MILLIGRAM(S): at 05:53

## 2024-06-11 RX ADMIN — OXYCODONE HYDROCHLORIDE 10 MILLIGRAM(S): 5 TABLET ORAL at 05:54

## 2024-06-11 RX ADMIN — Medication 10: at 08:23

## 2024-06-11 RX ADMIN — Medication 81 MILLIGRAM(S): at 12:27

## 2024-06-11 RX ADMIN — Medication 10: at 17:34

## 2024-06-11 RX ADMIN — GABAPENTIN 300 MILLIGRAM(S): 400 CAPSULE ORAL at 17:33

## 2024-06-11 RX ADMIN — Medication 250 MILLIGRAM(S): at 05:52

## 2024-06-11 RX ADMIN — Medication 500 MILLIGRAM(S): at 12:27

## 2024-06-11 NOTE — PROGRESS NOTE ADULT - ASSESSMENT
53M w/ PMHx of Afib, CAD s/p stents, CVA, DM, HTN, pulmonary embolism on Eliquis,  osteomyelitis s/p debridement presenting  presents to emergency department from [facility] for R heel ulcer debridement.    Afib, CAD, stents, HTN, PE on ELiquis  - Known  Afib, initially RVR but has been rate-controlled   - Continue Cardizem 360mg CD and Toprol 100 mg daily   - Continue Eliquis     - 3/18/24: Technically difficult ECHO w/ normal LV size and function EF 50-55%, mildly reduced RV systolic function, mod dilated LA and RA, mod to sev MR, mild to mod TR, PASP 36  - Euvolemic on exam with no O2 requirement  - Continue torsemide PO q12H.  Tolerated by renal function  - BP stable and controlled     - EKG showed A fib RVR, PVC @ 127.   - No evidence of any active ischemia   - Continue statin and ASA    - S/p surgical debridement of the right heel with bone biopsy, 6/4  - Tolerated procedure well, cardiac status optimal post operatively   - Pain control per Primary    - Monitor and replete lytes, keep K>4, Mg>2.  - Will continue to follow.    Henrietta Kim DNP, NP-C, AGACNP-C  Cardiology   Call TEAMS

## 2024-06-11 NOTE — PROGRESS NOTE ADULT - ASSESSMENT
Pt is a 53M w/ PMHx of Afib, CAD, DM presenting for R heel ulcer debridement.   S/p zosyn x7 day course at facility  admitted for debridement  Prior WCx MRSA/E. faecalis from 3/26/24  6/3 Now w/ uptrending WBC and drainage from wound   6/4 S/p Selective debridement of wound 04-Jun-2024 10:52:57  Parviz Todd    6/3 WCx   Numerous Staphylococcus aureus  6/4  TCx MRSA, Escherichia coli ESBL  Right calcaneus bone pathology:  -   Fragments of bone with chronic osteomyelitis.  -   Focal acute osteomyelitis cannot be ruled out.    Recommendations:   C/w vancomycin 1 gram IV q12, goal trough 15-20, dosing per pharmacy protocol and   -with inability to rule out osteo rec 6 weeks so last day 7/18  weekly cbc, CMP, ESR, Vanco troughs  PICC placed 6/7 standard PICC care  Appreciate podiatry/wound care    - now with bullous lesions allergic due to antibx v autoimmune   d/w pt rash has been present for 9 months states had bx and was told contact dermatitis but is disseminated looks more like psoriasis   but the bullae new and there are flaccid bullae. Timing is such that this started after start of ertapenem so stopped ertapenem today and rec obs off  no great options to cover ESBL but MRSA might be  (discussed with patient)    understand pt appealed discharge so possible DC 6/12    Thank you for consulting us and involving us in the management of this most interesting and challenging case.  We will follow along in the care of this patient. Please call us at [phone] or text me directly on my cell# at [phone] with any concerns.

## 2024-06-11 NOTE — PROGRESS NOTE ADULT - SUBJECTIVE AND OBJECTIVE BOX
[facility] -- Brittany Lutz, Reynaldo Sweeney Savella, , Gera Maya  Office # [phone]    Follow Up:   AFib, CAD, Cardiac Optimization    Subjective/Observations: asleep but arousable.  Remains comfortable on RA.  Denies any form of respiratory or cardiac discomfort    REVIEW OF SYSTEMS: All other review of systems is negative unless indicated above  PAST MEDICAL & SURGICAL HISTORY:  Diabetes      Diabetes mellitus with no complication      Afib      Hypertension      BPH (benign prostatic hyperplasia)      Perforated gastric ulcer  s/p emergent ex-lap omentopexy and plication 6/2019      Pulmonary embolism      History of non-ST elevation myocardial infarction (NSTEMI)      Osteomyelitis  s/p debridement      CAD S/P percutaneous coronary angioplasty      Cerebrovascular accident      H/O abdominal surgery      Perforated gastric ulcer      Traumatic amputation of left foot, initial encounter        MEDICATIONS  (STANDING):  apixaban 5 milliGRAM(s) Oral every 12 hours  ascorbic acid 500 milliGRAM(s) Oral daily  aspirin enteric coated 81 milliGRAM(s) Oral daily  atorvastatin 40 milliGRAM(s) Oral at bedtime  chlorhexidine 4% Liquid 1 Application(s) Topical <User Schedule>  cholecalciferol 1000 Unit(s) Oral daily  dextrose 50% Injectable 25 Gram(s) IV Push once  dextrose 50% Injectable 12.5 Gram(s) IV Push once  dextrose Oral Gel 15 Gram(s) Oral once  diltiazem    milliGRAM(s) Oral daily  ferrous    sulfate 325 milliGRAM(s) Oral two times a day  gabapentin 300 milliGRAM(s) Oral every 12 hours  glucagon  Injectable 1 milliGRAM(s) IntraMuscular once  insulin glargine Injectable (LANTUS) 15 Unit(s) SubCutaneous every morning  insulin lispro (ADMELOG) corrective regimen sliding scale   SubCutaneous at bedtime  insulin lispro (ADMELOG) corrective regimen sliding scale   SubCutaneous three times a day before meals  insulin lispro Injectable (ADMELOG) 3 Unit(s) SubCutaneous three times a day before meals  loratadine 10 milliGRAM(s) Oral daily  melatonin 5 milliGRAM(s) Oral at bedtime  metoclopramide Injectable 5 milliGRAM(s) IV Push three times a day  metoprolol succinate  milliGRAM(s) Oral daily  multivitamin/minerals 1 Tablet(s) Oral daily  naloxegol 25 milliGRAM(s) Oral daily  oxybutynin 10 milliGRAM(s) Oral two times a day  oxyCODONE    IR 10 milliGRAM(s) Oral two times a day  pantoprazole    Tablet 40 milliGRAM(s) Oral before breakfast  polyethylene glycol 3350 17 Gram(s) Oral daily  potassium chloride    Tablet ER 20 milliEquivalent(s) Oral three times a day  senna 2 Tablet(s) Oral at bedtime  sucralfate 1 Gram(s) Oral two times a day  tiotropium 2.5 MICROgram(s) Inhaler 2 Puff(s) Inhalation daily  torsemide 20 milliGRAM(s) Oral two times a day  vancomycin  IVPB 1000 milliGRAM(s) IV Intermittent every 12 hours    MEDICATIONS  (PRN):  acetaminophen     Tablet .. 650 milliGRAM(s) Oral every 6 hours PRN Temp greater or equal to 38C (100.4F), Mild Pain (1 - 3)  aluminum hydroxide/magnesium hydroxide/simethicone Suspension 30 milliLiter(s) Oral every 4 hours PRN Dyspepsia  diphenhydrAMINE 25 milliGRAM(s) Oral every 4 hours PRN Rash and/or Itching  morphine  - Injectable 2 milliGRAM(s) IV Push every 6 hours PRN Severe Pain (7 - 10)  ondansetron Injectable 4 milliGRAM(s) IV Push every 8 hours PRN Nausea and/or Vomiting  sodium chloride 0.65% Nasal 1 Spray(s) Both Nostrils three times a day PRN Nasal Congestion  sodium chloride 0.9% lock flush 10 milliLiter(s) IV Push every 1 hour PRN Pre/post blood products, medications, blood draw, and to maintain line patency    Allergies    No Known Drug Allergies  fish (Hives)    Intolerances      Vital Signs Last 24 Hrs  T(C): 36.4 (11 Jun 2024 04:50), Max: 36.7 (10 Paul 2024 12:00)  T(F): 97.6 (11 Jun 2024 04:50), Max: 98 (10 Paul 2024 12:00)  HR: 113 (11 Jun 2024 04:50) (81 - 113)  BP: 104/73 (11 Jun 2024 04:50) (104/73 - 123/69)  BP(mean): --  RR: 18 (11 Jun 2024 04:50) (18 - 20)  SpO2: 93% (11 Jun 2024 04:50) (91% - 95%)    Parameters below as of 11 Jun 2024 04:50  Patient On (Oxygen Delivery Method): room air      I&O's Summary    10 Paul 2024 07:01  -  11 Jun 2024 07:00  --------------------------------------------------------  IN: 120 mL / OUT: 2200 mL / NET: -2080 mL    PHYSICAL EXAM:  TELE: Not on tele  Constitutional: NAD, awake and alert, obese  HEENT: Moist Mucous Membranes, Anicteric  Pulmonary: Non-labored, breath sounds are clear bilaterally, No wheezing, rales or rhonchi  Cardiovascular: IRRR, S1 and S2, No murmurs, rubs, gallops or clicks  Gastrointestinal: Bowel Sounds present, soft, nontender.   Lymph: No peripheral edema. No lymphadenopathy.  Skin: +generalized rashes.  B/L foot ulcers with dressing dry and intact  Psych:  Mood & affect appropriate    LABS: All Labs Reviewed:                        12.6   12.51 )-----------( 286      ( 10 Paul 2024 06:42 )             40.8     10 Paul 2024 06:42    139    |  100    |  20     ----------------------------<  297    3.5     |  32     |  1.30     Ca    9.1        10 Paul 2024 06:42  Mg     1.9       10 Paul 2024 06:42    TPro  6.7    /  Alb  2.5    /  TBili  0.6    /  DBili  x      /  AST  17     /  ALT  16     /  AlkPhos  107    10 Paul 2024 06:42    12 Lead ECG:   Ventricular Rate 127 BPM    QRS Duration 82 ms    Q-T Interval 276 ms    QTC Calculation(Bazett) 401 ms    R Axis 13 degrees    T Axis 5 degrees    Diagnosis Line Atrial fibrillation with rapid ventricular response with premature ventricular or aberrantly conducted complexes  Low voltage QRS  Cannot rule out Anterior infarct , age undetermined  Abnormal ECG    Confirmed by darien France ([id number]) on 5/28/2024 2:53:37 PM (05-28-24 @ 11:53)      TRANSTHORACIC ECHOCARDIOGRAM REPORT  ________________________________________________________________________________                                      _______       Pt. Name:       SARAH MORRISON Study Date:    3/18/2024  MRN:            [id number]         YOB: 1970  Accession #:    [id number]        Age:           53 years  Account#:       [id number]       Gender:        M  Heart Rate:                      Height:        74.02 in (188.00 cm)  Rhythm:                          Weight:        244.71 lb (111.00 kg)  Blood Pressure: 100/60 mmHg      BSA/BMI:       2.37 m² / 31.41 kg/m²  ________________________________________________________________________________________  Referring Physician:    [id number] Hill Brizuela  Interpreting Physician: Mary Maya MD  Primary Sonographer:    Mounika FELDMAN    CPT:               ECHO TTE WO CON COMP W DOPP - 64077.m  Indication(s):     Dyspnea, unspecified - R06.00  Procedure:         Transthoracic echocardiogram with 2-D, M-mode and complete                     spectral and color flow Doppler.  Ordering Location: [facility], [place]  Admission Status:  Inpatient  Study Information: Image quality for this study is technically difficult.    _______________________________________________________________________________________     CONCLUSIONS:      1. Technically difficult image quality.   2. Left ventricular cavity is normal in size. Left ventricular wall thickness is normal. Left ventricular systolic function is normal with an ejection fraction visually estimated at 50 to 55 %.   3. Normal right ventricular cavity size, with normal wall thickness, and mildly reduced systolic function.   4. The left atrium is moderately dilated.   5. The right atrium is moderately dilated.   6. Moderate to severe mitral regurgitation.   7. Mild to moderate tricuspid regurgitation.   8. Estimated pulmonary artery systolic pressure is 36 mmHg, consistent with mild pulmonary hypertension.   9. The inferior vena cava is dilated measuring 2.40 cm in diameter, (dilated >2.1cm) with normal inspiratory collapse (normal >50%) consistent with mildly elevated right atrial pressure (~8, range 5-10mmHg).  10. Trace pericardial effusion.    ________________________________________________________________________________________  FINDINGS:     Left Ventricle:  The left ventricular cavity is normal in size. Left ventricular wall thickness is normal. Left ventricular systolic function is normal with an ejection fraction visually estimated at 50 to 55%.The left ventricular diastolic function is indeterminate.     Right Ventricle:  The right ventricular cavity is normal in size, with normal wall thickness and mildly reduced systolic function.     Left Atrium:  The left atrium is moderately dilated.     Right Atrium:  The right atrium is moderately dilated.     Aortic Valve:  The aortic valve anatomy cannot be determined with normal systolic excursion. There is no aortic valve stenosis.     Mitral Valve:  There is mild calcification of the mitralvalve annulus. There is moderate to severe mitral regurgitation.     Tricuspid Valve:  Structurally normal tricuspid valve with normal leaflet excursion. There is mild to moderate tricuspid regurgitation. Estimated pulmonary artery systolic pressure is 36 mmHg, consistent with mild pulmonary hypertension.     Pulmonic Valve:  The pulmonic valve was not well visualized. There is trace pulmonic regurgitation.     Pericardium:  There is a trace pericardial effusion.     Systemic Veins:  The inferiorvena cava is dilated measuring 2.40 cm in diameter, (dilated >2.1cm) with normal inspiratory collapse (normal >50%) consistent with mildly elevated right atrial pressure (~8, range 5-10mmHg).  ____________________________________________________________________  QUANTITATIVE DATA:  Left Ventricle Measurements: (Indexed to BSA)     IVSd (2D):   1.9 cm  LVPWd (2D):  1.5 cm  LVIDd (2D):  5.3 cm  LVIDs (2D):  3.9 cm  LV Mass:     413 g  174.4 g/m²  Visualized LV EF%: 50 to 55%     MV E Vmax:    1.15 m/s  e' lateral:   10.90 cm/s  e' medial:    10.50 cm/s  E/e' lateral: 10.56  E/e' medial:  12.00  E/e' Average: 10.76  MV DT:        137 msec    Aorta Measurements: (Normal range) (Indexed to BSA)     Sinuses of Valsalva: 3.50 cm (3.1 - 3.7 cm)       Left Atrium Measurements: (Indexed to BSA)  LA Diam 2D: 3.99 cm       LVOT / RVOT/ Qp/Qs Data: (Indexed to BSA)  LVOT Diameter: 2.22 cm    Mitral Valve Measurements:     MV E Vmax: 1.2 m/s       Tricuspid Valve Measurements:     TR Vmax:          2.6m/s  TR Peak Gradient: 27.7 mmHg  RA Pressure:      8 mmHg  PASP:             36 mmHg    ________________________________________________________________________________________  Electronically signed on 3/19/2024 at 11:27:59 AM by Mary Maya MD     *** Final ***

## 2024-06-11 NOTE — PROGRESS NOTE ADULT - SUBJECTIVE AND OBJECTIVE BOX
Date of Service 06-11-24 @ 17:17    Patient is a 53y old  Male who presents with a chief complaint of heel wound (11 Jun 2024 10:46)      INTERVAL /OVERNIGHT EVENTS: refusing DC    MEDICATIONS  (STANDING):  apixaban 5 milliGRAM(s) Oral every 12 hours  ascorbic acid 500 milliGRAM(s) Oral daily  aspirin enteric coated 81 milliGRAM(s) Oral daily  atorvastatin 40 milliGRAM(s) Oral at bedtime  chlorhexidine 4% Liquid 1 Application(s) Topical <User Schedule>  cholecalciferol 1000 Unit(s) Oral daily  dextrose 50% Injectable 25 Gram(s) IV Push once  dextrose 50% Injectable 12.5 Gram(s) IV Push once  dextrose Oral Gel 15 Gram(s) Oral once  diltiazem    milliGRAM(s) Oral daily  ferrous    sulfate 325 milliGRAM(s) Oral two times a day  gabapentin 300 milliGRAM(s) Oral every 12 hours  glucagon  Injectable 1 milliGRAM(s) IntraMuscular once  insulin glargine Injectable (LANTUS) 15 Unit(s) SubCutaneous every morning  insulin lispro (ADMELOG) corrective regimen sliding scale   SubCutaneous at bedtime  insulin lispro (ADMELOG) corrective regimen sliding scale   SubCutaneous three times a day before meals  insulin lispro Injectable (ADMELOG) 3 Unit(s) SubCutaneous three times a day before meals  loratadine 10 milliGRAM(s) Oral daily  melatonin 5 milliGRAM(s) Oral at bedtime  metoclopramide Injectable 5 milliGRAM(s) IV Push three times a day  metoprolol succinate  milliGRAM(s) Oral daily  multivitamin/minerals 1 Tablet(s) Oral daily  naloxegol 25 milliGRAM(s) Oral daily  oxybutynin 10 milliGRAM(s) Oral two times a day  oxyCODONE    IR 10 milliGRAM(s) Oral two times a day  pantoprazole    Tablet 40 milliGRAM(s) Oral before breakfast  polyethylene glycol 3350 17 Gram(s) Oral daily  potassium chloride    Tablet ER 20 milliEquivalent(s) Oral three times a day  senna 2 Tablet(s) Oral at bedtime  sucralfate 1 Gram(s) Oral two times a day  tiotropium 2.5 MICROgram(s) Inhaler 2 Puff(s) Inhalation daily  torsemide 20 milliGRAM(s) Oral two times a day  vancomycin  IVPB 750 milliGRAM(s) IV Intermittent every 12 hours    MEDICATIONS  (PRN):  acetaminophen     Tablet .. 650 milliGRAM(s) Oral every 6 hours PRN Temp greater or equal to 38C (100.4F), Mild Pain (1 - 3)  aluminum hydroxide/magnesium hydroxide/simethicone Suspension 30 milliLiter(s) Oral every 4 hours PRN Dyspepsia  diphenhydrAMINE 25 milliGRAM(s) Oral every 4 hours PRN Rash and/or Itching  morphine  - Injectable 2 milliGRAM(s) IV Push every 6 hours PRN Severe Pain (7 - 10)  ondansetron Injectable 4 milliGRAM(s) IV Push every 8 hours PRN Nausea and/or Vomiting  sodium chloride 0.65% Nasal 1 Spray(s) Both Nostrils three times a day PRN Nasal Congestion  sodium chloride 0.9% lock flush 10 milliLiter(s) IV Push every 1 hour PRN Pre/post blood products, medications, blood draw, and to maintain line patency      Allergies    ertapenem (Blisters; Rash)  fish (Hives)    Intolerances        REVIEW OF SYSTEMS:  CONSTITUTIONAL: No fever, weight loss, or fatigue  EYES: No eye pain, visual disturbances, or discharge  ENMT:  No difficulty hearing, tinnitus, vertigo; No sinus or throat pain  NECK: No pain or stiffness  RESPIRATORY: No cough, wheezing, chills or hemoptysis; No shortness of breath  CARDIOVASCULAR: No chest pain, palpitations, dizziness, or leg swelling  GASTROINTESTINAL: No abdominal or epigastric pain. No nausea, vomiting, or hematemesis; No diarrhea or constipation. No melena or hematochezia.  GENITOURINARY: No dysuria, frequency, hematuria, or incontinence  NEUROLOGICAL: No headaches, memory loss, loss of strength, numbness, or tremors  SKIN: + itching,+, rashes  LYMPH NODES: No enlarged glands  ENDOCRINE: No heat or cold intolerance; No hair loss; No polydipsia or polyuria  MUSCULOSKELETAL: No joint pain or swelling; No muscle, back, or extremity pain  PSYCHIATRIC: No depression, anxiety, mood swings, or difficulty sleeping  HEME/LYMPH: No easy bruising, or bleeding gums  ALLERGY AND IMMUNOLOGIC: No hives or eczema    Vital Signs Last 24 Hrs  T(C): 37.2 (11 Jun 2024 11:43), Max: 37.2 (11 Jun 2024 11:43)  T(F): 99 (11 Jun 2024 11:43), Max: 99 (11 Jun 2024 11:43)  HR: 85 (11 Jun 2024 13:46) (72 - 113)  BP: 112/79 (11 Jun 2024 13:46) (104/73 - 123/69)  BP(mean): --  RR: 18 (11 Jun 2024 13:46) (17 - 18)  SpO2: 92% (11 Jun 2024 13:46) (91% - 93%)    Parameters below as of 11 Jun 2024 13:46  Patient On (Oxygen Delivery Method): room air        PHYSICAL EXAM:  GENERAL: NAD, well-groomed, well-developed  HEAD:  Atraumatic, Normocephalic  EYES: EOMI, PERRLA, conjunctiva and sclera clear  ENMT: No tonsillar erythema, exudates, or enlargement; Moist mucous membranes, Good dentition, No lesions  NECK: Supple, No JVD, Normal thyroid  NERVOUS SYSTEM:  Alert & Oriented X3, Good concentration; Motor Strength 5/5 B/L upper and lower extremities; DTRs 2+ intact and symmetric  CHEST/LUNG: Clear to auscultation bilaterally; No rales, rhonchi, wheezing, or rubs  HEART: Regular rate and rhythm; No murmurs, rubs, or gallops  ABDOMEN: Soft, Nontender, Nondistended; Bowel sounds present  EXTREMITIES: right heel in surgical bandage, left TMA  LYMPH: No JEYSON  SKIN: + rashes     LABS:                        12.6   12.49 )-----------( 307      ( 11 Jun 2024 14:25 )             37.9       Ca    9.1        10 Paul 2024 06:42        Urinalysis Basic - ( 10 Paul 2024 06:42 )    Color: x / Appearance: x / SG: x / pH: x  Gluc: 297 mg/dL / Ketone: x  / Bili: x / Urobili: x   Blood: x / Protein: x / Nitrite: x   Leuk Esterase: x / RBC: x / WBC x   Sq Epi: x / Non Sq Epi: x / Bacteria: x      CAPILLARY BLOOD GLUCOSE      POCT Blood Glucose.: 371 mg/dL (11 Jun 2024 16:57)  POCT Blood Glucose.: 350 mg/dL (11 Jun 2024 12:02)  POCT Blood Glucose.: 363 mg/dL (11 Jun 2024 09:15)  POCT Blood Glucose.: 395 mg/dL (11 Jun 2024 07:57)  POCT Blood Glucose.: 324 mg/dL (10 Paul 2024 21:15)      RADIOLOGY & ADDITIONAL TESTS:    Notes Reviewed:  [x ] YES  [ ] NO    Care Discussed with Consultants/Other Providers [x ] YES  [ ] NO

## 2024-06-11 NOTE — PROGRESS NOTE ADULT - SUBJECTIVE AND OBJECTIVE BOX
CAPILLARY BLOOD GLUCOSE      POCT Blood Glucose.: 324 mg/dL (10 Paul 2024 21:15)  POCT Blood Glucose.: 197 mg/dL (10 Paul 2024 16:42)  POCT Blood Glucose.: 210 mg/dL (10 Paul 2024 11:40)  POCT Blood Glucose.: 310 mg/dL (10 Paul 2024 07:46)      Vital Signs Last 24 Hrs  T(C): 36.4 (11 Jun 2024 04:50), Max: 36.7 (10 Paul 2024 12:00)  T(F): 97.6 (11 Jun 2024 04:50), Max: 98 (10 Paul 2024 12:00)  HR: 113 (11 Jun 2024 04:50) (81 - 113)  BP: 104/73 (11 Jun 2024 04:50) (104/73 - 123/69)  BP(mean): --  RR: 18 (11 Jun 2024 04:50) (18 - 20)  SpO2: 93% (11 Jun 2024 04:50) (91% - 95%)    Parameters below as of 11 Jun 2024 04:50  Patient On (Oxygen Delivery Method): room air        General: WN/WD NAD  Respiratory: CTA B/L  CV: RRR, S1S2, no murmurs, rubs or gallops  Abdominal: Soft, NT, ND +BS, Last BM  Extremities: No edema, + peripheral pulses     06-10    139  |  100  |  20  ----------------------------<  297<H>  3.5   |  32<H>  |  1.30    Ca    9.1      10 Paul 2024 06:42  Mg     1.9     06-10    TPro  6.7  /  Alb  2.5<L>  /  TBili  0.6  /  DBili  x   /  AST  17  /  ALT  16  /  AlkPhos  107  06-10      atorvastatin 40 milliGRAM(s) Oral at bedtime  dextrose 50% Injectable 12.5 Gram(s) IV Push once  dextrose 50% Injectable 25 Gram(s) IV Push once  dextrose Oral Gel 15 Gram(s) Oral once  glucagon  Injectable 1 milliGRAM(s) IntraMuscular once  insulin glargine Injectable (LANTUS) 10 Unit(s) SubCutaneous every morning  insulin lispro (ADMELOG) corrective regimen sliding scale   SubCutaneous at bedtime  insulin lispro (ADMELOG) corrective regimen sliding scale   SubCutaneous three times a day before meals  insulin lispro Injectable (ADMELOG) 3 Unit(s) SubCutaneous three times a day before meals

## 2024-06-11 NOTE — SOCIAL WORK PROGRESS NOTE - NSSWPROGRESSNOTE_GEN_ALL_CORE
SW met with pt at bedside today. SW discussed pt's appeal that was made yesterday; pt reports that he wishes to wait to hear the determination before being dc. SW will continue to follow.

## 2024-06-11 NOTE — PROGRESS NOTE ADULT - SUBJECTIVE AND OBJECTIVE BOX
[facility] DIVISION of INFECTIOUS DISEASE  Juventino Whitten MD PhD, Symone Hickey MD, Anne-Marie Li MD, Jeffery Jacobs MD, Ryan Romeo MD  and providing coverage with Melody Armstrong MD  Providing Infectious Disease Consultations at [facility], [facility], [facility]    Office# [phone] to schedule follow up appointments  Answering Service for urgent calls or New Consults [phone]  Cell# to text for urgent issues Juventino Whitten [phone]     infectious diseases progress note:    SARAH MORRISON is a 53y y. o. Male patient    Overnight and events of the last 24hrs reviewed    Allergies    No Known Drug Allergies  fish (Hives)    Intolerances        ANTIBIOTICS/RELEVANT:  antimicrobials  vancomycin  IVPB 1000 milliGRAM(s) IV Intermittent every 12 hours    immunologic:    OTHER:  acetaminophen     Tablet .. 650 milliGRAM(s) Oral every 6 hours PRN  aluminum hydroxide/magnesium hydroxide/simethicone Suspension 30 milliLiter(s) Oral every 4 hours PRN  apixaban 5 milliGRAM(s) Oral every 12 hours  ascorbic acid 500 milliGRAM(s) Oral daily  aspirin enteric coated 81 milliGRAM(s) Oral daily  atorvastatin 40 milliGRAM(s) Oral at bedtime  chlorhexidine 4% Liquid 1 Application(s) Topical <User Schedule>  cholecalciferol 1000 Unit(s) Oral daily  dextrose 50% Injectable 12.5 Gram(s) IV Push once  dextrose 50% Injectable 25 Gram(s) IV Push once  dextrose Oral Gel 15 Gram(s) Oral once  diltiazem    milliGRAM(s) Oral daily  diphenhydrAMINE 25 milliGRAM(s) Oral every 4 hours PRN  ferrous    sulfate 325 milliGRAM(s) Oral two times a day  gabapentin 300 milliGRAM(s) Oral every 12 hours  glucagon  Injectable 1 milliGRAM(s) IntraMuscular once  insulin glargine Injectable (LANTUS) 15 Unit(s) SubCutaneous every morning  insulin lispro (ADMELOG) corrective regimen sliding scale   SubCutaneous three times a day before meals  insulin lispro (ADMELOG) corrective regimen sliding scale   SubCutaneous at bedtime  insulin lispro Injectable (ADMELOG) 3 Unit(s) SubCutaneous three times a day before meals  loratadine 10 milliGRAM(s) Oral daily  melatonin 5 milliGRAM(s) Oral at bedtime  metoclopramide Injectable 5 milliGRAM(s) IV Push three times a day  metoprolol succinate  milliGRAM(s) Oral daily  morphine  - Injectable 2 milliGRAM(s) IV Push every 6 hours PRN  multivitamin/minerals 1 Tablet(s) Oral daily  naloxegol 25 milliGRAM(s) Oral daily  ondansetron Injectable 4 milliGRAM(s) IV Push every 8 hours PRN  oxybutynin 10 milliGRAM(s) Oral two times a day  oxyCODONE    IR 10 milliGRAM(s) Oral two times a day  pantoprazole    Tablet 40 milliGRAM(s) Oral before breakfast  polyethylene glycol 3350 17 Gram(s) Oral daily  potassium chloride    Tablet ER 20 milliEquivalent(s) Oral three times a day  senna 2 Tablet(s) Oral at bedtime  sodium chloride 0.65% Nasal 1 Spray(s) Both Nostrils three times a day PRN  sodium chloride 0.9% lock flush 10 milliLiter(s) IV Push every 1 hour PRN  sucralfate 1 Gram(s) Oral two times a day  tiotropium 2.5 MICROgram(s) Inhaler 2 Puff(s) Inhalation daily  torsemide 20 milliGRAM(s) Oral two times a day      Objective:  Vital Signs Last 24 Hrs  T(C): 36.4 (11 Jun 2024 04:50), Max: 36.7 (10 Paul 2024 12:00)  T(F): 97.6 (11 Jun 2024 04:50), Max: 98 (10 Paul 2024 12:00)  HR: 113 (11 Jun 2024 04:50) (81 - 113)  BP: 104/73 (11 Jun 2024 04:50) (104/73 - 123/69)  BP(mean): --  RR: 18 (11 Jun 2024 04:50) (18 - 20)  SpO2: 93% (11 Jun 2024 04:50) (91% - 95%)    Parameters below as of 11 Jun 2024 04:50  Patient On (Oxygen Delivery Method): room air        T(C): 36.4 (06-11-24 @ 04:50), Max: 37.2 (06-09-24 @ 11:50)  T(C): 36.4 (06-11-24 @ 04:50), Max: 37.2 (06-08-24 @ 19:48)  T(C): 36.4 (06-11-24 @ 04:50), Max: 37.2 (06-08-24 @ 19:48)    PHYSICAL EXAM:  HEENT: NC atraumatic  Neck: supple  Respiratory: no accessory muscle use, breathing comfortably  Cardiovascular: distant  Gastrointestinal: normal appearing, nondistended  Extremities: no clubbing, no cyanosis,  Skin: areas wrapped, still with blisters      LABS:                          12.6   12.51 )-----------( 286      ( 10 Paul 2024 06:42 )             40.8       WBC  12.51 06-10 @ 06:42  11.16 06-08 @ 06:50  12.07 06-07 @ 12:50  11.76 06-06 @ 07:30      06-10    139  |  100  |  20  ----------------------------<  297<H>  3.5   |  32<H>  |  1.30    Ca    9.1      10 Paul 2024 06:42  Mg     1.9     06-10    TPro  6.7  /  Alb  2.5<L>  /  TBili  0.6  /  DBili  x   /  AST  17  /  ALT  16  /  AlkPhos  107  06-10      Creatinine: 1.30 mg/dL (06-10-24 @ 06:42)  Creatinine: 0.98 mg/dL (06-06-24 @ 07:30)        Urinalysis Basic - ( 10 Paul 2024 06:42 )    Color: x / Appearance: x / SG: x / pH: x  Gluc: 297 mg/dL / Ketone: x  / Bili: x / Urobili: x   Blood: x / Protein: x / Nitrite: x   Leuk Esterase: x / RBC: x / WBC x   Sq Epi: x / Non Sq Epi: x / Bacteria: x            INFLAMMATORY MARKERS      MICROBIOLOGY:              RADIOLOGY & ADDITIONAL STUDIES:

## 2024-06-11 NOTE — PHARMACOTHERAPY INTERVENTION NOTE - COMMENTS
Vancomycin Dosing Per Pharmacy:  Patient is a 53 year old male being treated for a R heel wound, currently on IV vancomycin 1000mg Q12H. Trough on 6/11 resulted 15.7 with correlating supra-therapeutic AUCs > 600. Reduced dose to 750 mg Q12H + pre-4th trough.    1. Indication for the vancomycin: R heel wound  2. Requesting Provider: Dr. Anne-Marie Li   3. Current plan and dosing regimen: 750 mg Q12H  4. Day of therapy: 8  5. Cultures: tissue culture 5/26/24: E faecalis (susceptible to vancomycin) and MRSA. Tissue culture 06/04/24: ESBL E coli and E faecalis susceptible to vancomycin  6. Target trough or AUC/BAYLEE: 400-600  7. Day and time level is due: 6/13 at 05:00  8. Previous levels: 6/4 (19:31): 22, 6/5 (05:47): 14, 6/6 (20:20): 14, 6/9 (04:58): 14.4, 6/11 (04:30): 15.7  9. Expected 24-hour AUC: 529

## 2024-06-11 NOTE — PROVIDER CONTACT NOTE (CRITICAL VALUE NOTIFICATION) - TEST AND RESULT REPORTED:
+ BC from 6/3 yielded >3 colony types of bacteria. Might indicate contamination. Numerous MRSA's. Call back [facility] if further work-up is indicated.
Tissue cultures 6/4 - Tissue culture, rare polymorphic nucleur leukocytes  and few gram positive cocci in pairs.   Few polymorphic leukocytes
tissue culture, source bone right calcaneous from 06/04/2024 final result:  few E Coli ESBL and rare MRSA

## 2024-06-11 NOTE — PROVIDER CONTACT NOTE (CRITICAL VALUE NOTIFICATION) - BACKGROUND
Pt admitted for R diabetic foot ulcer. MRSA in wound. Surgical debridement done
No new orders received

## 2024-06-12 ENCOUNTER — TRANSCRIPTION ENCOUNTER (OUTPATIENT)
Age: 54
End: 2024-06-12

## 2024-06-12 LAB
GLUCOSE BLDC GLUCOMTR-MCNC: 244 MG/DL — HIGH (ref 70–99)
GLUCOSE BLDC GLUCOMTR-MCNC: 287 MG/DL — HIGH (ref 70–99)
GLUCOSE BLDC GLUCOMTR-MCNC: 291 MG/DL — HIGH (ref 70–99)
GLUCOSE BLDC GLUCOMTR-MCNC: 334 MG/DL — HIGH (ref 70–99)

## 2024-06-12 PROCEDURE — 99232 SBSQ HOSP IP/OBS MODERATE 35: CPT

## 2024-06-12 RX ORDER — OXYCODONE HYDROCHLORIDE 5 MG/1
10 TABLET ORAL
Refills: 0 | Status: DISCONTINUED | OUTPATIENT
Start: 2024-06-12 | End: 2024-06-13

## 2024-06-12 RX ORDER — MORPHINE SULFATE 50 MG/1
2 CAPSULE, EXTENDED RELEASE ORAL EVERY 6 HOURS
Refills: 0 | Status: DISCONTINUED | OUTPATIENT
Start: 2024-06-12 | End: 2024-06-13

## 2024-06-12 RX ADMIN — PANTOPRAZOLE SODIUM 40 MILLIGRAM(S): 20 TABLET, DELAYED RELEASE ORAL at 06:20

## 2024-06-12 RX ADMIN — MORPHINE SULFATE 2 MILLIGRAM(S): 50 CAPSULE, EXTENDED RELEASE ORAL at 22:11

## 2024-06-12 RX ADMIN — Medication 1 GRAM(S): at 06:19

## 2024-06-12 RX ADMIN — Medication 3 UNIT(S): at 17:44

## 2024-06-12 RX ADMIN — Medication 325 MILLIGRAM(S): at 06:20

## 2024-06-12 RX ADMIN — ATORVASTATIN CALCIUM 40 MILLIGRAM(S): 80 TABLET, FILM COATED ORAL at 22:11

## 2024-06-12 RX ADMIN — Medication 10 MILLIGRAM(S): at 17:44

## 2024-06-12 RX ADMIN — Medication 100 MILLIGRAM(S): at 06:20

## 2024-06-12 RX ADMIN — Medication 5 MILLIGRAM(S): at 13:38

## 2024-06-12 RX ADMIN — GABAPENTIN 300 MILLIGRAM(S): 400 CAPSULE ORAL at 06:20

## 2024-06-12 RX ADMIN — MORPHINE SULFATE 2 MILLIGRAM(S): 50 CAPSULE, EXTENDED RELEASE ORAL at 23:05

## 2024-06-12 RX ADMIN — ONDANSETRON 4 MILLIGRAM(S): 8 TABLET, FILM COATED ORAL at 07:24

## 2024-06-12 RX ADMIN — Medication 25 MILLIGRAM(S): at 06:25

## 2024-06-12 RX ADMIN — Medication 325 MILLIGRAM(S): at 17:45

## 2024-06-12 RX ADMIN — MORPHINE SULFATE 2 MILLIGRAM(S): 50 CAPSULE, EXTENDED RELEASE ORAL at 14:40

## 2024-06-12 RX ADMIN — Medication 5 MILLIGRAM(S): at 22:11

## 2024-06-12 RX ADMIN — TIOTROPIUM BROMIDE 2 PUFF(S): 18 CAPSULE ORAL; RESPIRATORY (INHALATION) at 06:34

## 2024-06-12 RX ADMIN — Medication 8: at 08:22

## 2024-06-12 RX ADMIN — Medication 5 MILLIGRAM(S): at 22:09

## 2024-06-12 RX ADMIN — APIXABAN 5 MILLIGRAM(S): 2.5 TABLET, FILM COATED ORAL at 17:45

## 2024-06-12 RX ADMIN — Medication 81 MILLIGRAM(S): at 13:45

## 2024-06-12 RX ADMIN — CHLORHEXIDINE GLUCONATE 1 APPLICATION(S): 213 SOLUTION TOPICAL at 13:54

## 2024-06-12 RX ADMIN — Medication 6: at 12:32

## 2024-06-12 RX ADMIN — LORATADINE 10 MILLIGRAM(S): 10 TABLET ORAL at 13:46

## 2024-06-12 RX ADMIN — Medication 2: at 22:08

## 2024-06-12 RX ADMIN — Medication 10 MILLIGRAM(S): at 06:19

## 2024-06-12 RX ADMIN — Medication 250 MILLIGRAM(S): at 06:22

## 2024-06-12 RX ADMIN — Medication 25 MILLIGRAM(S): at 13:38

## 2024-06-12 RX ADMIN — Medication 20 MILLIGRAM(S): at 06:20

## 2024-06-12 RX ADMIN — Medication 4: at 17:43

## 2024-06-12 RX ADMIN — APIXABAN 5 MILLIGRAM(S): 2.5 TABLET, FILM COATED ORAL at 06:19

## 2024-06-12 RX ADMIN — SENNA PLUS 2 TABLET(S): 8.6 TABLET ORAL at 22:11

## 2024-06-12 RX ADMIN — Medication 250 MILLIGRAM(S): at 18:28

## 2024-06-12 RX ADMIN — NALOXEGOL OXALATE 25 MILLIGRAM(S): 12.5 TABLET, FILM COATED ORAL at 14:01

## 2024-06-12 RX ADMIN — Medication 20 MILLIEQUIVALENT(S): at 22:12

## 2024-06-12 RX ADMIN — Medication 20 MILLIEQUIVALENT(S): at 13:46

## 2024-06-12 RX ADMIN — Medication 20 MILLIGRAM(S): at 17:45

## 2024-06-12 RX ADMIN — Medication 1 GRAM(S): at 17:45

## 2024-06-12 RX ADMIN — Medication 5 MILLIGRAM(S): at 06:19

## 2024-06-12 RX ADMIN — Medication 360 MILLIGRAM(S): at 06:21

## 2024-06-12 RX ADMIN — Medication 20 MILLIEQUIVALENT(S): at 06:20

## 2024-06-12 RX ADMIN — GABAPENTIN 300 MILLIGRAM(S): 400 CAPSULE ORAL at 17:45

## 2024-06-12 RX ADMIN — Medication 25 MILLIGRAM(S): at 22:11

## 2024-06-12 RX ADMIN — MORPHINE SULFATE 2 MILLIGRAM(S): 50 CAPSULE, EXTENDED RELEASE ORAL at 14:01

## 2024-06-12 RX ADMIN — INSULIN GLARGINE 15 UNIT(S): 100 INJECTION, SOLUTION SUBCUTANEOUS at 08:09

## 2024-06-12 NOTE — PROGRESS NOTE ADULT - PROBLEM SELECTOR PLAN 6
diffuse maculopapular eruption, most likely contact dermatitis  cont topical triamcinolone for now  outpt derm eval
diffuse maculopapular eruption, now bullous   most likely contact dermatitis  cont topical triamcinolone for now  outpt derm eval  trial steroid - S/P iv solumedrol yesterday, oral prednisone today
diffuse maculopapular eruption, most likely contact dermatitis  cont topical triamcinolone for now  outpt derm eval
diffuse maculopapular eruption, now bullous   most likely contact dermatitis  cont topical triamcinolone for now  outpt derm eval  trial steroid - seems improved - will cont low dose prednisone for now
diffuse maculopapular eruption, now bullous   most likely contact dermatitis  cont topical triamcinolone for now  outpt derm eval  trial steroid

## 2024-06-12 NOTE — PROGRESS NOTE ADULT - PROBLEM SELECTOR PLAN 5
continue diuretics - switched to PO    US doppler- neg for dvt
continue diuretics   US doppler- neg for dvt
continue diuretics - switched to PO    US doppler- neg for dvt

## 2024-06-12 NOTE — PROGRESS NOTE ADULT - PROBLEM SELECTOR PLAN 3
Heparin for DVT prevention

## 2024-06-12 NOTE — SOCIAL WORK PROGRESS NOTE - NSSWPROGRESSNOTE_GEN_ALL_CORE
Pt lost his discharge appeal. Pt will be transferred to [facility] tomorrow at 9am via ambulance. Pt is in agreement with plan. SW will remain available as needed.

## 2024-06-12 NOTE — PROGRESS NOTE ADULT - SUBJECTIVE AND OBJECTIVE BOX
[facility] DIVISION of INFECTIOUS DISEASE  Juventino Whitten MD PhD, Symone Hickey MD, Anne-Marie Li MD, Jeffery Jacobs MD, Ryan Romeo MD  and providing coverage with Melody Armstrong MD  Providing Infectious Disease Consultations at [facility], [facility], [facility]'s    Office# [phone] to schedule follow up appointments  Answering Service for urgent calls or New Consults [phone]  Cell# to text for urgent issues Juventino Whitten [phone]     infectious diseases progress note:    SARAH MORRISON is a 53y y. o. Male patient    Overnight and events of the last 24hrs reviewed    Allergies    ertapenem (Blisters; Rash)  fish (Hives)    Intolerances        ANTIBIOTICS/RELEVANT:  antimicrobials  vancomycin  IVPB 750 milliGRAM(s) IV Intermittent every 12 hours    immunologic:    OTHER:  acetaminophen     Tablet .. 650 milliGRAM(s) Oral every 6 hours PRN  aluminum hydroxide/magnesium hydroxide/simethicone Suspension 30 milliLiter(s) Oral every 4 hours PRN  apixaban 5 milliGRAM(s) Oral every 12 hours  ascorbic acid 500 milliGRAM(s) Oral daily  aspirin enteric coated 81 milliGRAM(s) Oral daily  atorvastatin 40 milliGRAM(s) Oral at bedtime  chlorhexidine 4% Liquid 1 Application(s) Topical <User Schedule>  cholecalciferol 1000 Unit(s) Oral daily  dextrose 50% Injectable 12.5 Gram(s) IV Push once  dextrose 50% Injectable 25 Gram(s) IV Push once  dextrose Oral Gel 15 Gram(s) Oral once  diltiazem    milliGRAM(s) Oral daily  diphenhydrAMINE 25 milliGRAM(s) Oral every 4 hours PRN  ferrous    sulfate 325 milliGRAM(s) Oral two times a day  gabapentin 300 milliGRAM(s) Oral every 12 hours  glucagon  Injectable 1 milliGRAM(s) IntraMuscular once  insulin glargine Injectable (LANTUS) 15 Unit(s) SubCutaneous every morning  insulin lispro (ADMELOG) corrective regimen sliding scale   SubCutaneous three times a day before meals  insulin lispro (ADMELOG) corrective regimen sliding scale   SubCutaneous at bedtime  insulin lispro Injectable (ADMELOG) 3 Unit(s) SubCutaneous three times a day before meals  loratadine 10 milliGRAM(s) Oral daily  melatonin 5 milliGRAM(s) Oral at bedtime  metoclopramide Injectable 5 milliGRAM(s) IV Push three times a day  metoprolol succinate  milliGRAM(s) Oral daily  multivitamin/minerals 1 Tablet(s) Oral daily  naloxegol 25 milliGRAM(s) Oral daily  ondansetron Injectable 4 milliGRAM(s) IV Push every 8 hours PRN  oxybutynin 10 milliGRAM(s) Oral two times a day  pantoprazole    Tablet 40 milliGRAM(s) Oral before breakfast  polyethylene glycol 3350 17 Gram(s) Oral daily  potassium chloride    Tablet ER 20 milliEquivalent(s) Oral three times a day  senna 2 Tablet(s) Oral at bedtime  sodium chloride 0.65% Nasal 1 Spray(s) Both Nostrils three times a day PRN  sodium chloride 0.9% lock flush 10 milliLiter(s) IV Push every 1 hour PRN  sucralfate 1 Gram(s) Oral two times a day  tiotropium 2.5 MICROgram(s) Inhaler 2 Puff(s) Inhalation daily  torsemide 20 milliGRAM(s) Oral two times a day      Objective:  Vital Signs Last 24 Hrs  T(C): 37 (12 Jun 2024 11:52), Max: 37 (12 Jun 2024 11:52)  T(F): 98.6 (12 Jun 2024 11:52), Max: 98.6 (12 Jun 2024 11:52)  HR: 84 (12 Jun 2024 11:52) (84 - 89)  BP: 101/62 (12 Jun 2024 11:52) (101/62 - 126/93)  BP(mean): --  RR: 18 (12 Jun 2024 11:52) (18 - 18)  SpO2: 92% (12 Jun 2024 11:52) (91% - 94%)    Parameters below as of 12 Jun 2024 11:52  Patient On (Oxygen Delivery Method): room air        T(C): 37 (06-12-24 @ 11:52), Max: 37.2 (06-11-24 @ 11:43)  T(C): 37 (06-12-24 @ 11:52), Max: 37.2 (06-11-24 @ 11:43)  T(C): 37 (06-12-24 @ 11:52), Max: 37.2 (06-08-24 @ 19:48)    PHYSICAL EXAM:  HEENT: NC atraumatic  Neck: supple  Respiratory: no accessory muscle use, breathing comfortably  Cardiovascular: distant  Gastrointestinal: normal appearing, nondistended  Extremities: no clubbing, no cyanosis,        LABS:                          12.6   12.49 )-----------( 307      ( 11 Jun 2024 14:25 )             37.9       WBC  12.49 06-11 @ 14:25  12.51 06-10 @ 06:42  11.16 06-08 @ 06:50  12.07 06-07 @ 12:50  11.76 06-06 @ 07:30              Creatinine: 1.30 mg/dL (06-10-24 @ 06:42)  Creatinine: 0.98 mg/dL (06-06-24 @ 07:30)                INFLAMMATORY MARKERS      MICROBIOLOGY:              RADIOLOGY & ADDITIONAL STUDIES:

## 2024-06-12 NOTE — DISCHARGE NOTE NURSING/CASE MANAGEMENT/SOCIAL WORK - NSDCVIVACCINE_GEN_ALL_CORE_FT
pneumococcal polysaccharide PPV23; 31-Oct-2019 13:45; Gabbie Andrew (RN); [employer] [id number] (Exp. Date: 07-Aug-2020); IntraMuscular; Deltoid Right.; 0.5 milliLiter(s); VIS (VIS Published: 06-Oct-2009, VIS Presented: 31-Oct-2019);

## 2024-06-12 NOTE — PROGRESS NOTE ADULT - PROBLEM SELECTOR PROBLEM 4
Atrial fibrillation
Leg swelling
Atrial fibrillation
Leg swelling
Atrial fibrillation

## 2024-06-12 NOTE — PROGRESS NOTE ADULT - SUBJECTIVE AND OBJECTIVE BOX
[facility] -- Brittany Lutz, Reynaldo Sweeney Savella, Francesco Boss: Office # [phone]    Follow Up:   AFib, CAD, Cardiac Optimization    Subjective/Observations: Patient awake, alert, resting in bed. Denies chest pain, palpitations and dizziness. Denies any difficulty breathing. No orthopnea and PND. Tolerating room air.     REVIEW OF SYSTEMS: All other review of systems are negative unless indicated above    PAST MEDICAL & SURGICAL HISTORY:  Diabetes      Diabetes mellitus with no complication      Afib      Hypertension      Hypertension      BPH (benign prostatic hyperplasia)      Perforated gastric ulcer  s/p emergent ex-lap omentopexy and plication 6/2019      Pulmonary embolism      History of non-ST elevation myocardial infarction (NSTEMI)      Osteomyelitis  s/p debridement      CAD S/P percutaneous coronary angioplasty      Cerebrovascular accident      H/O abdominal surgery      Perforated gastric ulcer      Traumatic amputation of left foot, initial encounter      MEDICATIONS  (STANDING):  apixaban 5 milliGRAM(s) Oral every 12 hours  ascorbic acid 500 milliGRAM(s) Oral daily  aspirin enteric coated 81 milliGRAM(s) Oral daily  atorvastatin 40 milliGRAM(s) Oral at bedtime  chlorhexidine 4% Liquid 1 Application(s) Topical <User Schedule>  cholecalciferol 1000 Unit(s) Oral daily  dextrose 50% Injectable 12.5 Gram(s) IV Push once  dextrose 50% Injectable 25 Gram(s) IV Push once  dextrose Oral Gel 15 Gram(s) Oral once  diltiazem    milliGRAM(s) Oral daily  ferrous    sulfate 325 milliGRAM(s) Oral two times a day  gabapentin 300 milliGRAM(s) Oral every 12 hours  glucagon  Injectable 1 milliGRAM(s) IntraMuscular once  insulin glargine Injectable (LANTUS) 15 Unit(s) SubCutaneous every morning  insulin lispro (ADMELOG) corrective regimen sliding scale   SubCutaneous three times a day before meals  insulin lispro (ADMELOG) corrective regimen sliding scale   SubCutaneous at bedtime  insulin lispro Injectable (ADMELOG) 3 Unit(s) SubCutaneous three times a day before meals  loratadine 10 milliGRAM(s) Oral daily  melatonin 5 milliGRAM(s) Oral at bedtime  metoclopramide Injectable 5 milliGRAM(s) IV Push three times a day  metoprolol succinate  milliGRAM(s) Oral daily  multivitamin/minerals 1 Tablet(s) Oral daily  naloxegol 25 milliGRAM(s) Oral daily  oxybutynin 10 milliGRAM(s) Oral two times a day  pantoprazole    Tablet 40 milliGRAM(s) Oral before breakfast  polyethylene glycol 3350 17 Gram(s) Oral daily  potassium chloride    Tablet ER 20 milliEquivalent(s) Oral three times a day  senna 2 Tablet(s) Oral at bedtime  sucralfate 1 Gram(s) Oral two times a day  tiotropium 2.5 MICROgram(s) Inhaler 2 Puff(s) Inhalation daily  torsemide 20 milliGRAM(s) Oral two times a day  vancomycin  IVPB 750 milliGRAM(s) IV Intermittent every 12 hours    MEDICATIONS  (PRN):  acetaminophen     Tablet .. 650 milliGRAM(s) Oral every 6 hours PRN Temp greater or equal to 38C (100.4F), Mild Pain (1 - 3)  aluminum hydroxide/magnesium hydroxide/simethicone Suspension 30 milliLiter(s) Oral every 4 hours PRN Dyspepsia  diphenhydrAMINE 25 milliGRAM(s) Oral every 4 hours PRN Rash and/or Itching  ondansetron Injectable 4 milliGRAM(s) IV Push every 8 hours PRN Nausea and/or Vomiting  sodium chloride 0.65% Nasal 1 Spray(s) Both Nostrils three times a day PRN Nasal Congestion  sodium chloride 0.9% lock flush 10 milliLiter(s) IV Push every 1 hour PRN Pre/post blood products, medications, blood draw, and to maintain line patency    Allergies  ertapenem (Blisters; Rash)  fish (Hives)    Vital Signs Last 24 Hrs  T(C): 36.7 (12 Jun 2024 05:31), Max: 37.2 (11 Jun 2024 11:43)  T(F): 98 (12 Jun 2024 05:31), Max: 99 (11 Jun 2024 11:43)  HR: 88 (12 Jun 2024 05:31) (72 - 89)  BP: 106/69 (12 Jun 2024 05:31) (106/69 - 126/93)  BP(mean): --  RR: 18 (12 Jun 2024 05:31) (17 - 18)  SpO2: 91% (12 Jun 2024 05:31) (91% - 94%)    Parameters below as of 12 Jun 2024 05:31  Patient On (Oxygen Delivery Method): room air      I&O's Summary    11 Jun 2024 07:01  -  12 Jun 2024 07:00  --------------------------------------------------------  IN: 0 mL / OUT: 3250 mL / NET: -3250 mL    TELE: Not on telemetry   Constitutional: NAD, awake and alert, obese  HEENT: Moist Mucous Membranes, Anicteric  Pulmonary: Non-labored, breath sounds are clear bilaterally, No wheezing, rales or rhonchi  Cardiovascular: IRRR, S1 and S2, + murmurs, rubs, gallops or clicks  Gastrointestinal: Bowel Sounds present, soft, nontender.   Lymph: No peripheral edema. No lymphadenopathy.  Skin: +generalized rashes.  B/L foot ulcers with dressing dry and intact  Psych:  Mood & affect appropriate    LABS: All Labs Reviewed:                        12.6   12.49 )-----------( 307      ( 11 Jun 2024 14:25 )             37.9                         12.6   12.51 )-----------( 286      ( 10 Paul 2024 06:42 )             40.8     10 Paul 2024 06:42    139    |  100    |  20     ----------------------------<  297    3.5     |  32     |  1.30     Ca    9.1        10 Paul 2024 06:42  Mg     1.9       10 Paul 2024 06:42    TPro  6.7    /  Alb  2.5    /  TBili  0.6    /  DBili  x      /  AST  17     /  ALT  16     /  AlkPhos  107    10 Paul 2024 06:42       12 Lead ECG:   Ventricular Rate 127 BPM    QRS Duration 82 ms    Q-T Interval 276 ms    QTC Calculation(Bazett) 401 ms    R Axis 13 degrees    T Axis 5 degrees    Diagnosis Line Atrial fibrillation with rapid ventricular response with premature ventricular or aberrantly conducted complexes  Low voltage QRS  Cannot rule out Anterior infarct , age undetermined  Abnormal ECG    Confirmed by darien France ([id number]) on 5/28/2024 2:53:37 PM (05-28-24 @ 11:53)      TRANSTHORACIC ECHOCARDIOGRAM REPORT  ________________________________________________________________________________                                      _______       Pt. Name:       SARAH MORRISON Study Date:    3/18/2024  MRN:            [id number]         YOB: 1970  Accession #:    [id number]        Age:           53 years  Account#:       [id number]       Gender:        M  Heart Rate:                      Height:        74.02 in (188.00 cm)  Rhythm:                          Weight:        244.71 lb (111.00 kg)  Blood Pressure: 100/60 mmHg      BSA/BMI:       2.37 m² / 31.41 kg/m²  ________________________________________________________________________________________  Referring Physician:    [id number] Hill Brizuela  Interpreting Physician: Mary Maya MD  Primary Sonographer:    Mounika FELDAMN    CPT:               ECHO TTE WO CON COMP W DOPP - 36547.m  Indication(s):     Dyspnea, unspecified - R06.00  Procedure:         Transthoracic echocardiogram with 2-D, M-mode and complete                     spectral and color flow Doppler.  Ordering Location: [facility]  Admission Status:  Inpatient  Study Information: Image quality for this study is technically difficult.    _______________________________________________________________________________________     CONCLUSIONS:      1. Technically difficult image quality.   2. Left ventricular cavity is normal in size. Left ventricular wall thickness is normal. Left ventricular systolic function is normal with an ejection fraction visually estimated at 50 to 55 %.   3. Normal right ventricular cavity size, with normal wall thickness, and mildly reduced systolic function.   4. The left atrium is moderately dilated.   5. The right atrium is moderately dilated.   6. Moderate to severe mitral regurgitation.   7. Mild to moderate tricuspid regurgitation.   8. Estimated pulmonary artery systolic pressure is 36 mmHg, consistent with mild pulmonary hypertension.   9. The inferior vena cava is dilated measuring 2.40 cm in diameter, (dilated >2.1cm) with normal inspiratory collapse (normal >50%) consistent with mildly elevated right atrial pressure (~8, range 5-10mmHg).  10. Trace pericardial effusion.    ________________________________________________________________________________________  FINDINGS:     Left Ventricle:  The left ventricular cavity is normal in size. Left ventricular wall thickness is normal. Left ventricular systolic function is normal with an ejection fraction visually estimated at 50 to 55%.The left ventricular diastolic function is indeterminate.     Right Ventricle:  The right ventricular cavity is normal in size, with normal wall thickness and mildly reduced systolic function.     Left Atrium:  The left atrium is moderately dilated.     Right Atrium:  The right atrium is moderately dilated.     Aortic Valve:  The aortic valve anatomy cannot be determined with normal systolic excursion. There is no aortic valve stenosis.     Mitral Valve:  There is mild calcification of the mitralvalve annulus. There is moderate to severe mitral regurgitation.     Tricuspid Valve:  Structurally normal tricuspid valve with normal leaflet excursion. There is mild to moderate tricuspid regurgitation. Estimated pulmonary artery systolic pressure is 36 mmHg, consistent with mild pulmonary hypertension.     Pulmonic Valve:  The pulmonic valve was not well visualized. There is trace pulmonic regurgitation.     Pericardium:  There is a trace pericardial effusion.     Systemic Veins:  The inferiorvena cava is dilated measuring 2.40 cm in diameter, (dilated >2.1cm) with normal inspiratory collapse (normal >50%) consistent with mildly elevated right atrial pressure (~8, range 5-10mmHg).  ____________________________________________________________________  QUANTITATIVE DATA:  Left Ventricle Measurements: (Indexed to BSA)     IVSd (2D):   1.9 cm  LVPWd (2D):  1.5 cm  LVIDd (2D):  5.3 cm  LVIDs (2D):  3.9 cm  LV Mass:     413 g  174.4 g/m²  Visualized LV EF%: 50 to 55%     MV E Vmax:    1.15 m/s  e' lateral:   10.90 cm/s  e' medial:    10.50 cm/s  E/e' lateral: 10.56  E/e' medial:  12.00  E/e' Average: 10.76  MV DT:        137 msec    Aorta Measurements: (Normal range) (Indexed to BSA)     Sinuses of Valsalva: 3.50 cm (3.1 - 3.7 cm)       Left Atrium Measurements: (Indexed to BSA)  LA Diam 2D: 3.99 cm       LVOT / RVOT/ Qp/Qs Data: (Indexed to BSA)  LVOT Diameter: 2.22 cm    Mitral Valve Measurements:     MV E Vmax: 1.2 m/s       Tricuspid Valve Measurements:     TR Vmax:          2.6m/s  TR Peak Gradient: 27.7 mmHg  RA Pressure:      8 mmHg  PASP:             36 mmHg    ________________________________________________________________________________________  Electronically signed on 3/19/2024 at 11:27:59 AM by Mary Maya MD         *** Final *** [facility] -- Brittany Lutz Pannella, Patel, Savella, Goodger, Cohen: Office # [phone]    Follow Up:   AFib, CAD, Cardiac Optimization    Subjective/Observations: Patient awake, alert, resting in bed. Denies chest pain, palpitations and dizziness. Denies any difficulty breathing. No orthopnea and PND. Tolerating room air. Scattered blisters in body     REVIEW OF SYSTEMS: All other review of systems are negative unless indicated above    PAST MEDICAL & SURGICAL HISTORY:  Diabetes      Diabetes mellitus with no complication      Afib      Hypertension      Hypertension      BPH (benign prostatic hyperplasia)      Perforated gastric ulcer  s/p emergent ex-lap omentopexy and plication 6/2019      Pulmonary embolism      History of non-ST elevation myocardial infarction (NSTEMI)      Osteomyelitis  s/p debridement      CAD S/P percutaneous coronary angioplasty      Cerebrovascular accident      H/O abdominal surgery      Perforated gastric ulcer      Traumatic amputation of left foot, initial encounter      MEDICATIONS  (STANDING):  apixaban 5 milliGRAM(s) Oral every 12 hours  ascorbic acid 500 milliGRAM(s) Oral daily  aspirin enteric coated 81 milliGRAM(s) Oral daily  atorvastatin 40 milliGRAM(s) Oral at bedtime  chlorhexidine 4% Liquid 1 Application(s) Topical <User Schedule>  cholecalciferol 1000 Unit(s) Oral daily  dextrose 50% Injectable 12.5 Gram(s) IV Push once  dextrose 50% Injectable 25 Gram(s) IV Push once  dextrose Oral Gel 15 Gram(s) Oral once  diltiazem    milliGRAM(s) Oral daily  ferrous    sulfate 325 milliGRAM(s) Oral two times a day  gabapentin 300 milliGRAM(s) Oral every 12 hours  glucagon  Injectable 1 milliGRAM(s) IntraMuscular once  insulin glargine Injectable (LANTUS) 15 Unit(s) SubCutaneous every morning  insulin lispro (ADMELOG) corrective regimen sliding scale   SubCutaneous three times a day before meals  insulin lispro (ADMELOG) corrective regimen sliding scale   SubCutaneous at bedtime  insulin lispro Injectable (ADMELOG) 3 Unit(s) SubCutaneous three times a day before meals  loratadine 10 milliGRAM(s) Oral daily  melatonin 5 milliGRAM(s) Oral at bedtime  metoclopramide Injectable 5 milliGRAM(s) IV Push three times a day  metoprolol succinate  milliGRAM(s) Oral daily  multivitamin/minerals 1 Tablet(s) Oral daily  naloxegol 25 milliGRAM(s) Oral daily  oxybutynin 10 milliGRAM(s) Oral two times a day  pantoprazole    Tablet 40 milliGRAM(s) Oral before breakfast  polyethylene glycol 3350 17 Gram(s) Oral daily  potassium chloride    Tablet ER 20 milliEquivalent(s) Oral three times a day  senna 2 Tablet(s) Oral at bedtime  sucralfate 1 Gram(s) Oral two times a day  tiotropium 2.5 MICROgram(s) Inhaler 2 Puff(s) Inhalation daily  torsemide 20 milliGRAM(s) Oral two times a day  vancomycin  IVPB 750 milliGRAM(s) IV Intermittent every 12 hours    MEDICATIONS  (PRN):  acetaminophen     Tablet .. 650 milliGRAM(s) Oral every 6 hours PRN Temp greater or equal to 38C (100.4F), Mild Pain (1 - 3)  aluminum hydroxide/magnesium hydroxide/simethicone Suspension 30 milliLiter(s) Oral every 4 hours PRN Dyspepsia  diphenhydrAMINE 25 milliGRAM(s) Oral every 4 hours PRN Rash and/or Itching  ondansetron Injectable 4 milliGRAM(s) IV Push every 8 hours PRN Nausea and/or Vomiting  sodium chloride 0.65% Nasal 1 Spray(s) Both Nostrils three times a day PRN Nasal Congestion  sodium chloride 0.9% lock flush 10 milliLiter(s) IV Push every 1 hour PRN Pre/post blood products, medications, blood draw, and to maintain line patency    Allergies  ertapenem (Blisters; Rash)  fish (Hives)    Vital Signs Last 24 Hrs  T(C): 36.7 (12 Jun 2024 05:31), Max: 37.2 (11 Jun 2024 11:43)  T(F): 98 (12 Jun 2024 05:31), Max: 99 (11 Jun 2024 11:43)  HR: 88 (12 Jun 2024 05:31) (72 - 89)  BP: 106/69 (12 Jun 2024 05:31) (106/69 - 126/93)  BP(mean): --  RR: 18 (12 Jun 2024 05:31) (17 - 18)  SpO2: 91% (12 Jun 2024 05:31) (91% - 94%)    Parameters below as of 12 Jun 2024 05:31  Patient On (Oxygen Delivery Method): room air      I&O's Summary    11 Jun 2024 07:01  -  12 Jun 2024 07:00  --------------------------------------------------------  IN: 0 mL / OUT: 3250 mL / NET: -3250 mL    TELE: Not on telemetry   Constitutional: NAD, awake and alert, obese  HEENT: Moist Mucous Membranes, Anicteric  Pulmonary: Non-labored, breath sounds are clear bilaterally, No wheezing, rales or rhonchi  Cardiovascular: IRRR, S1 and S2, + murmurs, rubs, gallops or clicks  Gastrointestinal: Bowel Sounds present, soft, nontender.   Lymph: No peripheral edema. No lymphadenopathy.  Skin: +generalized rashes.  B/L foot ulcers with dressing dry and intact  Psych:  Mood & affect appropriate    LABS: All Labs Reviewed:                        12.6   12.49 )-----------( 307      ( 11 Jun 2024 14:25 )             37.9                         12.6   12.51 )-----------( 286      ( 10 Paul 2024 06:42 )             40.8     10 Paul 2024 06:42    139    |  100    |  20     ----------------------------<  297    3.5     |  32     |  1.30     Ca    9.1        10 Paul 2024 06:42  Mg     1.9       10 Paul 2024 06:42    TPro  6.7    /  Alb  2.5    /  TBili  0.6    /  DBili  x      /  AST  17     /  ALT  16     /  AlkPhos  107    10 Paul 2024 06:42       12 Lead ECG:   Ventricular Rate 127 BPM    QRS Duration 82 ms    Q-T Interval 276 ms    QTC Calculation(Bazett) 401 ms    R Axis 13 degrees    T Axis 5 degrees    Diagnosis Line Atrial fibrillation with rapid ventricular response with premature ventricular or aberrantly conducted complexes  Low voltage QRS  Cannot rule out Anterior infarct , age undetermined  Abnormal ECG    Confirmed by darien France ([id number]) on 5/28/2024 2:53:37 PM (05-28-24 @ 11:53)      TRANSTHORACIC ECHOCARDIOGRAM REPORT  ________________________________________________________________________________                                      _______       Pt. Name:       SARAH MORRISON Study Date:    3/18/2024  MRN:            [id number]         YOB: 1970  Accession #:    [id number]        Age:           53 years  Account#:       [id number]       Gender:        M  Heart Rate:                      Height:        74.02 in (188.00 cm)  Rhythm:                          Weight:        244.71 lb (111.00 kg)  Blood Pressure: 100/60 mmHg      BSA/BMI:       2.37 m² / 31.41 kg/m²  ________________________________________________________________________________________  Referring Physician:    [id number] Hill Brizuela  Interpreting Physician: Mary Maya MD  Primary Sonographer:    Mounika FELDMAN    CPT:               ECHO TTE WO CON COMP W DOPP - 45060.m  Indication(s):     Dyspnea, unspecified - R06.00  Procedure:         Transthoracic echocardiogram with 2-D, M-mode and complete                     spectral and color flow Doppler.  Ordering Location: [facility]  Admission Status:  Inpatient  Study Information: Image quality for this study is technically difficult.    _______________________________________________________________________________________     CONCLUSIONS:      1. Technically difficult image quality.   2. Left ventricular cavity is normal in size. Left ventricular wall thickness is normal. Left ventricular systolic function is normal with an ejection fraction visually estimated at 50 to 55 %.   3. Normal right ventricular cavity size, with normal wall thickness, and mildly reduced systolic function.   4. The left atrium is moderately dilated.   5. The right atrium is moderately dilated.   6. Moderate to severe mitral regurgitation.   7. Mild to moderate tricuspid regurgitation.   8. Estimated pulmonary artery systolic pressure is 36 mmHg, consistent with mild pulmonary hypertension.   9. The inferior vena cava is dilated measuring 2.40 cm in diameter, (dilated >2.1cm) with normal inspiratory collapse (normal >50%) consistent with mildly elevated right atrial pressure (~8, range 5-10mmHg).  10. Trace pericardial effusion.    ________________________________________________________________________________________  FINDINGS:     Left Ventricle:  The left ventricular cavity is normal in size. Left ventricular wall thickness is normal. Left ventricular systolic function is normal with an ejection fraction visually estimated at 50 to 55%.The left ventricular diastolic function is indeterminate.     Right Ventricle:  The right ventricular cavity is normal in size, with normal wall thickness and mildly reduced systolic function.     Left Atrium:  The left atrium is moderately dilated.     Right Atrium:  The right atrium is moderately dilated.     Aortic Valve:  The aortic valve anatomy cannot be determined with normal systolic excursion. There is no aortic valve stenosis.     Mitral Valve:  There is mild calcification of the mitralvalve annulus. There is moderate to severe mitral regurgitation.     Tricuspid Valve:  Structurally normal tricuspid valve with normal leaflet excursion. There is mild to moderate tricuspid regurgitation. Estimated pulmonary artery systolic pressure is 36 mmHg, consistent with mild pulmonary hypertension.     Pulmonic Valve:  The pulmonic valve was not well visualized. There is trace pulmonic regurgitation.     Pericardium:  There is a trace pericardial effusion.     Systemic Veins:  The inferiorvena cava is dilated measuring 2.40 cm in diameter, (dilated >2.1cm) with normal inspiratory collapse (normal >50%) consistent with mildly elevated right atrial pressure (~8, range 5-10mmHg).  ____________________________________________________________________  QUANTITATIVE DATA:  Left Ventricle Measurements: (Indexed to BSA)     IVSd (2D):   1.9 cm  LVPWd (2D):  1.5 cm  LVIDd (2D):  5.3 cm  LVIDs (2D):  3.9 cm  LV Mass:     413 g  174.4 g/m²  Visualized LV EF%: 50 to 55%     MV E Vmax:    1.15 m/s  e' lateral:   10.90 cm/s  e' medial:    10.50 cm/s  E/e' lateral: 10.56  E/e' medial:  12.00  E/e' Average: 10.76  MV DT:        137 msec    Aorta Measurements: (Normal range) (Indexed to BSA)     Sinuses of Valsalva: 3.50 cm (3.1 - 3.7 cm)       Left Atrium Measurements: (Indexed to BSA)  LA Diam 2D: 3.99 cm       LVOT / RVOT/ Qp/Qs Data: (Indexed to BSA)  LVOT Diameter: 2.22 cm    Mitral Valve Measurements:     MV E Vmax: 1.2 m/s       Tricuspid Valve Measurements:     TR Vmax:          2.6m/s  TR Peak Gradient: 27.7 mmHg  RA Pressure:      8 mmHg  PASP:             36 mmHg    ________________________________________________________________________________________  Electronically signed on 3/19/2024 at 11:27:59 AM by Mary Maya MD         *** Final ***

## 2024-06-12 NOTE — PHARMACOTHERAPY INTERVENTION NOTE - COMMENTS
Vancomycin Dosing Per Pharmacy:  Patient is a 53 year old male being treated for a R heel wound, currently on IV vancomycin 750 mg Q12H.    1. Indication for the vancomycin: R heel wound  2. Requesting Provider: Dr. Anne-Marie Li   3. Current plan and dosing regimen: 750 mg Q12H  4. Day of therapy: 9  5. Cultures: tissue culture 5/26/24: E faecalis (susceptible to vancomycin) and MRSA. Tissue culture 06/04/24: ESBL E coli and E faecalis susceptible to vancomycin  6. Target trough or AUC/BAYLEE: 400-600  7. Day and time level is due: 6/13 at 05:00  8. Previous levels: 6/4 (19:31): 22, 6/5 (05:47): 14, 6/6 (20:20): 14, 6/9 (04:58): 14.4, 6/11 (04:30): 15.7  9. Expected 24-hour AUC: 495

## 2024-06-12 NOTE — PROGRESS NOTE ADULT - PROBLEM SELECTOR PROBLEM 5
Leg swelling

## 2024-06-12 NOTE — PROGRESS NOTE ADULT - ASSESSMENT
Pt is a 53M w/ PMHx of Afib, CAD, DM presenting for R heel ulcer debridement.   S/p zosyn x7 day course at facility  admitted for debridement  Prior WCx MRSA/E. faecalis from 3/26/24  6/3 Now w/ uptrending WBC and drainage from wound   6/4 S/p Selective debridement of wound 04-Jun-2024 10:52:57  Parviz Todd    6/3 WCx   Numerous Staphylococcus aureus  6/4  TCx MRSA, Escherichia coli ESBL  Right calcaneus bone pathology:  -   Fragments of bone with chronic osteomyelitis.  -   Focal acute osteomyelitis cannot be ruled out.    Recommendations:   C/w vancomycin 1 gram IV q12, goal trough 15-20, dosing per pharmacy protocol and   -with inability to rule out osteo rec 6 weeks so last day 7/18  weekly cbc, CMP, ESR, Vanco troughs  PICC placed 6/7 standard PICC care  Appreciate podiatry/wound care    - now with bullous lesions allergic due to antibx v autoimmune   d/w pt rash has been present for 9 months states had bx and was told contact dermatitis but is disseminated, the bullae new and there are flaccid bullae. Timing is such that this started after start of ertapenem so stopped ertapenem and rec obs off  no great options to cover ESBL but MRSA might be  (discussed with patient)    Thank you for consulting us and involving us in the management of this most interesting and challenging case.  We will follow along in the care of this patient. Please call us at [phone] or text me directly on my cell# at [phone] with any concerns.

## 2024-06-12 NOTE — PROGRESS NOTE ADULT - SUBJECTIVE AND OBJECTIVE BOX
Patient is a 53y old  Male who presents with a chief complaint of ankle wound (12 Jun 2024 12:02)      INTERVAL HPI/OVERNIGHT EVENTS:  T(C): 37 (06-12-24 @ 11:52), Max: 37 (06-12-24 @ 11:52)  HR: 84 (06-12-24 @ 11:52) (84 - 89)  BP: 101/62 (06-12-24 @ 11:52) (101/62 - 126/93)  RR: 18 (06-12-24 @ 11:52) (18 - 18)  SpO2: 92% (06-12-24 @ 11:52) (91% - 94%)  Wt(kg): --  I&O's Summary    11 Jun 2024 07:01  -  12 Jun 2024 07:00  --------------------------------------------------------  IN: 0 mL / OUT: 3250 mL / NET: -3250 mL    12 Jun 2024 07:01  -  12 Jun 2024 13:52  --------------------------------------------------------  IN: 0 mL / OUT: 800 mL / NET: -800 mL        LABS:                        12.6   12.49 )-----------( 307      ( 11 Jun 2024 14:25 )             37.9               CAPILLARY BLOOD GLUCOSE      POCT Blood Glucose.: 291 mg/dL (12 Jun 2024 11:35)  POCT Blood Glucose.: 334 mg/dL (12 Jun 2024 07:58)  POCT Blood Glucose.: 358 mg/dL (11 Jun 2024 21:54)  POCT Blood Glucose.: 371 mg/dL (11 Jun 2024 16:57)            MEDICATIONS  (STANDING):  apixaban 5 milliGRAM(s) Oral every 12 hours  ascorbic acid 500 milliGRAM(s) Oral daily  aspirin enteric coated 81 milliGRAM(s) Oral daily  atorvastatin 40 milliGRAM(s) Oral at bedtime  chlorhexidine 4% Liquid 1 Application(s) Topical <User Schedule>  cholecalciferol 1000 Unit(s) Oral daily  dextrose 50% Injectable 12.5 Gram(s) IV Push once  dextrose 50% Injectable 25 Gram(s) IV Push once  dextrose Oral Gel 15 Gram(s) Oral once  diltiazem    milliGRAM(s) Oral daily  ferrous    sulfate 325 milliGRAM(s) Oral two times a day  gabapentin 300 milliGRAM(s) Oral every 12 hours  glucagon  Injectable 1 milliGRAM(s) IntraMuscular once  insulin glargine Injectable (LANTUS) 15 Unit(s) SubCutaneous every morning  insulin lispro (ADMELOG) corrective regimen sliding scale   SubCutaneous three times a day before meals  insulin lispro (ADMELOG) corrective regimen sliding scale   SubCutaneous at bedtime  insulin lispro Injectable (ADMELOG) 3 Unit(s) SubCutaneous three times a day before meals  loratadine 10 milliGRAM(s) Oral daily  melatonin 5 milliGRAM(s) Oral at bedtime  metoclopramide Injectable 5 milliGRAM(s) IV Push three times a day  metoprolol succinate  milliGRAM(s) Oral daily  multivitamin/minerals 1 Tablet(s) Oral daily  naloxegol 25 milliGRAM(s) Oral daily  oxybutynin 10 milliGRAM(s) Oral two times a day  pantoprazole    Tablet 40 milliGRAM(s) Oral before breakfast  polyethylene glycol 3350 17 Gram(s) Oral daily  potassium chloride    Tablet ER 20 milliEquivalent(s) Oral three times a day  senna 2 Tablet(s) Oral at bedtime  sucralfate 1 Gram(s) Oral two times a day  tiotropium 2.5 MICROgram(s) Inhaler 2 Puff(s) Inhalation daily  torsemide 20 milliGRAM(s) Oral two times a day  vancomycin  IVPB 750 milliGRAM(s) IV Intermittent every 12 hours    MEDICATIONS  (PRN):  acetaminophen     Tablet .. 650 milliGRAM(s) Oral every 6 hours PRN Temp greater or equal to 38C (100.4F), Mild Pain (1 - 3)  aluminum hydroxide/magnesium hydroxide/simethicone Suspension 30 milliLiter(s) Oral every 4 hours PRN Dyspepsia  diphenhydrAMINE 25 milliGRAM(s) Oral every 4 hours PRN Rash and/or Itching  morphine  - Injectable 2 milliGRAM(s) IV Push every 6 hours PRN Severe Pain (7 - 10)  ondansetron Injectable 4 milliGRAM(s) IV Push every 8 hours PRN Nausea and/or Vomiting  sodium chloride 0.65% Nasal 1 Spray(s) Both Nostrils three times a day PRN Nasal Congestion  sodium chloride 0.9% lock flush 10 milliLiter(s) IV Push every 1 hour PRN Pre/post blood products, medications, blood draw, and to maintain line patency      REVIEW OF SYSTEMS:  CONSTITUTIONAL: No fever, weight loss, or fatigue  EYES: No eye pain, visual disturbances, or discharge  ENMT:  No difficulty hearing, tinnitus, vertigo; No sinus or throat pain  NECK: No pain or stiffness  RESPIRATORY: No cough, wheezing, chills or hemoptysis; No shortness of breath  CARDIOVASCULAR: No chest pain, palpitations, dizziness, or leg swelling  GASTROINTESTINAL: No abdominal or epigastric pain. No nausea, vomiting, or hematemesis; No diarrhea or constipation. No melena or hematochezia.  GENITOURINARY: No dysuria, frequency, hematuria, or incontinence  NEUROLOGICAL: No headaches, memory loss, loss of strength, numbness, or tremors  SKIN: No itching, burning, rashes, or lesions   LYMPH NODES: No enlarged glands  ENDOCRINE: No heat or cold intolerance; No hair loss  MUSCULOSKELETAL: No joint pain or swelling; No muscle, back, or extremity pain  PSYCHIATRIC: No depression, anxiety, mood swings, or difficulty sleeping  HEME/LYMPH: No easy bruising, or bleeding gums  ALLERY AND IMMUNOLOGIC: No hives or eczema    RADIOLOGY & ADDITIONAL TESTS:    Imaging Personally Reviewed:  [ x] YES  [ ] NO    Consultant(s) Notes Reviewed:  [x ] YES  [ ] NO    PHYSICAL EXAM:  GENERAL: NAD, well-groomed, well-developed  HEAD:  Atraumatic, Normocephalic  EYES: EOMI, PERRLA, conjunctiva and sclera clear  ENMT: No tonsillar erythema, exudates, or enlargement; Moist mucous membranes, Good dentition, No lesions  NECK: Supple, No JVD, Normal thyroid  NERVOUS SYSTEM:  Alert & Oriented X3, Good concentration; Motor Strength 5/5 B/L upper and lower extremities; DTRs 2+ intact and symmetric  CHEST/LUNG: Clear to percussion bilaterally; No rales, rhonchi, wheezing, or rubs  HEART: Regular rate and rhythm; No murmurs, rubs, or gallops  ABDOMEN: Soft, Nontender, Nondistended; Bowel sounds present  EXTREMITIES:  2+ Peripheral Pulses, No clubbing, cyanosis, or edema  LYMPH: No lymphadenopathy noted  SKIN: No rashes or lesions    Care Discussed with Consultants/Other Providers [x ] YES  [ ] NO

## 2024-06-12 NOTE — DISCHARGE NOTE NURSING/CASE MANAGEMENT/SOCIAL WORK - PATIENT PORTAL LINK FT
You can access the FollowMyHealth Patient Portal offered by [facility] by registering at the following website: [email or web address]. By joining [facility]’s FollowMyHealth portal, you will also be able to view your health information using other applications (apps) compatible with our system.

## 2024-06-12 NOTE — PROGRESS NOTE ADULT - PROBLEM/PLAN-3
Pt only took 10ml of the milk of magnesium.   
DISPLAY PLAN FREE TEXT

## 2024-06-12 NOTE — PROGRESS NOTE ADULT - PROBLEM SELECTOR PLAN 2
Insulin scale   hba1c  endo eval with dr. perlman appreciated
Insulin scale   hba1c  endo eval with dr. perlman appreciated
Insulin scale  check hba1c  endo eval
Insulin scale   hba1c  endo eval with dr. perlman appreciated
Insulin scale  check hba1c  endo eval with dr. perlman
Insulin scale   hba1c  endo eval with dr. perlman appreciated

## 2024-06-12 NOTE — PROGRESS NOTE ADULT - PROBLEM SELECTOR PLAN 4
continue diuretics   US doppler- neg for dvt
continue diuretics  check US doppler- neg for dvt
rate controlled  AC on hold  continue Cardizem and digoxin   check dig level
rate controlled  will resume eliquis  cont asa  will change to cardizem cd and toprol xl
rate controlled  AC on hold  continue Cardizem and digoxin   check dig level in AM
rate controlled  will resume eliquis  cont asa  will change to cardizem cd and toprol xl
rate controlled  AC on hold  continue Cardizem and digoxin   check dig level in AM
rate controlled  AC on hold  continue Cardizem and digoxin   check dig level in AM
rate controlled  will resume eliquis  cont asa  will change to cardizem cd and toprol xl
rate controlled  will resume eliquis  cont asa  will change to cardizem cd and toprol xl
rate controlled  AC on hold  now on dig

## 2024-06-12 NOTE — PROGRESS NOTE ADULT - ASSESSMENT
53M w/ PMHx of Afib, CAD s/p stents, CVA, DM, HTN, pulmonary embolism on Eliquis,  osteomyelitis s/p debridement presenting  presents to emergency department from [facility] for R heel ulcer debridement.    Afib, CAD, stents, HTN, PE on ELiquis  - Known  Afib, initially RVR but has been rate-controlled   - Continue Cardizem 360mg CD and Toprol 100 mg daily   - Continue Eliquis     - 3/18/24: Technically difficult ECHO w/ normal LV size and function EF 50-55%, mildly reduced RV systolic function, mod dilated LA and RA, mod to sev MR, mild to mod TR, PASP 36  - Euvolemic on exam with no O2 requirement  - Continue torsemide PO q12H.  Tolerated by renal function    - EKG showed A fib RVR, PVC @ 127.   - No evidence of any active ischemia   - Continue statin and ASA    - BP stable and controlled     - S/p surgical debridement of the right heel with bone biopsy, 6/4  - Tolerated procedure well, cardiac status optimal post operatively   - Pain control per Primary    - Monitor and replete lytes, keep K>4, Mg>2.  - Will continue to follow.    Danny Arce, MS FNP, AGACNP  Nurse Practitioner- Cardiology   Please call on TEAMS

## 2024-06-13 ENCOUNTER — INPATIENT (INPATIENT)
Facility: HOSPITAL | Age: 54
LOS: 24 days | Discharge: NURSING, REHAB OR LONG-TERM CARE | DRG: 987 | End: 2024-07-08
Attending: FAMILY MEDICINE | Admitting: FAMILY MEDICINE
Payer: MEDICAID

## 2024-06-13 VITALS
OXYGEN SATURATION: 92 % | DIASTOLIC BLOOD PRESSURE: 68 MMHG | HEART RATE: 71 BPM | TEMPERATURE: 98 F | SYSTOLIC BLOOD PRESSURE: 103 MMHG | RESPIRATION RATE: 20 BRPM

## 2024-06-13 VITALS — HEART RATE: 59 BPM | OXYGEN SATURATION: 99 %

## 2024-06-13 DIAGNOSIS — J96.01 ACUTE RESPIRATORY FAILURE WITH HYPOXIA: ICD-10-CM

## 2024-06-13 DIAGNOSIS — S98.912A COMPLETE TRAUMATIC AMPUTATION OF LEFT FOOT, LEVEL UNSPECIFIED, INITIAL ENCOUNTER: Chronic | ICD-10-CM

## 2024-06-13 DIAGNOSIS — Z98.890 OTHER SPECIFIED POSTPROCEDURAL STATES: Chronic | ICD-10-CM

## 2024-06-13 DIAGNOSIS — K25.5 CHRONIC OR UNSPECIFIED GASTRIC ULCER WITH PERFORATION: Chronic | ICD-10-CM

## 2024-06-13 LAB
APTT BLD: 44 SEC — HIGH (ref 24.5–35.6)
GLUCOSE BLDC GLUCOMTR-MCNC: 240 MG/DL — HIGH (ref 70–99)
HCT VFR BLD CALC: 46.4 % — SIGNIFICANT CHANGE UP (ref 39–50)
HGB BLD-MCNC: 13.6 G/DL — SIGNIFICANT CHANGE UP (ref 13–17)
INR BLD: 2.27 RATIO — HIGH (ref 0.85–1.18)
LACTATE SERPL-SCNC: 13.8 MMOL/L — CRITICAL HIGH (ref 0.7–2)
MCHC RBC-ENTMCNC: 29.3 GM/DL — LOW (ref 32–36)
MCHC RBC-ENTMCNC: 30.6 PG — SIGNIFICANT CHANGE UP (ref 27–34)
MCV RBC AUTO: 104.5 FL — HIGH (ref 80–100)
PLATELET # BLD AUTO: 320 K/UL — SIGNIFICANT CHANGE UP (ref 150–400)
PROTHROM AB SERPL-ACNC: 25.9 SEC — HIGH (ref 9.5–13)
RBC # BLD: 4.44 M/UL — SIGNIFICANT CHANGE UP (ref 4.2–5.8)
RBC # FLD: 14.6 % — HIGH (ref 10.3–14.5)
VANCOMYCIN TROUGH SERPL-MCNC: 17.3 UG/ML — SIGNIFICANT CHANGE UP (ref 10–20)
WBC # BLD: 23.52 K/UL — HIGH (ref 3.8–10.5)
WBC # FLD AUTO: 23.52 K/UL — HIGH (ref 3.8–10.5)

## 2024-06-13 PROCEDURE — 86900 BLOOD TYPING SEROLOGIC ABO: CPT

## 2024-06-13 PROCEDURE — 93005 ELECTROCARDIOGRAM TRACING: CPT

## 2024-06-13 PROCEDURE — 93010 ELECTROCARDIOGRAM REPORT: CPT

## 2024-06-13 PROCEDURE — 94640 AIRWAY INHALATION TREATMENT: CPT

## 2024-06-13 PROCEDURE — 93970 EXTREMITY STUDY: CPT

## 2024-06-13 PROCEDURE — 99285 EMERGENCY DEPT VISIT HI MDM: CPT

## 2024-06-13 PROCEDURE — 82962 GLUCOSE BLOOD TEST: CPT

## 2024-06-13 PROCEDURE — 83036 HEMOGLOBIN GLYCOSYLATED A1C: CPT

## 2024-06-13 PROCEDURE — 85025 COMPLETE CBC W/AUTO DIFF WBC: CPT

## 2024-06-13 PROCEDURE — 87077 CULTURE AEROBIC IDENTIFY: CPT

## 2024-06-13 PROCEDURE — 36415 COLL VENOUS BLD VENIPUNCTURE: CPT

## 2024-06-13 PROCEDURE — 86850 RBC ANTIBODY SCREEN: CPT

## 2024-06-13 PROCEDURE — C1713: CPT

## 2024-06-13 PROCEDURE — 85027 COMPLETE CBC AUTOMATED: CPT

## 2024-06-13 PROCEDURE — 83735 ASSAY OF MAGNESIUM: CPT

## 2024-06-13 PROCEDURE — 99291 CRITICAL CARE FIRST HOUR: CPT

## 2024-06-13 PROCEDURE — 85610 PROTHROMBIN TIME: CPT

## 2024-06-13 PROCEDURE — 80202 ASSAY OF VANCOMYCIN: CPT

## 2024-06-13 PROCEDURE — 71045 X-RAY EXAM CHEST 1 VIEW: CPT

## 2024-06-13 PROCEDURE — 93010 ELECTROCARDIOGRAM REPORT: CPT | Mod: 77

## 2024-06-13 PROCEDURE — 71045 X-RAY EXAM CHEST 1 VIEW: CPT | Mod: 26

## 2024-06-13 PROCEDURE — 87070 CULTURE OTHR SPECIMN AEROBIC: CPT

## 2024-06-13 PROCEDURE — C1751: CPT

## 2024-06-13 PROCEDURE — 80048 BASIC METABOLIC PNL TOTAL CA: CPT

## 2024-06-13 PROCEDURE — 36573 INSJ PICC RS&I 5 YR+: CPT

## 2024-06-13 PROCEDURE — 88304 TISSUE EXAM BY PATHOLOGIST: CPT

## 2024-06-13 PROCEDURE — 87075 CULTR BACTERIA EXCEPT BLOOD: CPT

## 2024-06-13 PROCEDURE — 76937 US GUIDE VASCULAR ACCESS: CPT

## 2024-06-13 PROCEDURE — 87186 SC STD MICRODIL/AGAR DIL: CPT

## 2024-06-13 PROCEDURE — 80053 COMPREHEN METABOLIC PANEL: CPT

## 2024-06-13 PROCEDURE — 85652 RBC SED RATE AUTOMATED: CPT

## 2024-06-13 PROCEDURE — 73630 X-RAY EXAM OF FOOT: CPT

## 2024-06-13 PROCEDURE — 86140 C-REACTIVE PROTEIN: CPT

## 2024-06-13 PROCEDURE — 88311 DECALCIFY TISSUE: CPT

## 2024-06-13 PROCEDURE — 85730 THROMBOPLASTIN TIME PARTIAL: CPT

## 2024-06-13 PROCEDURE — 99232 SBSQ HOSP IP/OBS MODERATE 35: CPT

## 2024-06-13 PROCEDURE — 86901 BLOOD TYPING SEROLOGIC RH(D): CPT

## 2024-06-13 RX ORDER — INSULIN LISPRO 100/ML
5 VIAL (ML) SUBCUTANEOUS
Refills: 0 | Status: DISCONTINUED | OUTPATIENT
Start: 2024-06-13 | End: 2024-06-13

## 2024-06-13 RX ORDER — LORAZEPAM 0.5 MG
1 TABLET ORAL ONCE
Refills: 0 | Status: DISCONTINUED | OUTPATIENT
Start: 2024-06-13 | End: 2024-06-13

## 2024-06-13 RX ORDER — PROPOFOL 10 MG/ML
5 INJECTION, EMULSION INTRAVENOUS
Qty: 1000 | Refills: 0 | Status: DISCONTINUED | OUTPATIENT
Start: 2024-06-13 | End: 2024-06-15

## 2024-06-13 RX ORDER — INSULIN GLARGINE 100 [IU]/ML
20 INJECTION, SOLUTION SUBCUTANEOUS EVERY MORNING
Refills: 0 | Status: DISCONTINUED | OUTPATIENT
Start: 2024-06-14 | End: 2024-06-13

## 2024-06-13 RX ORDER — SODIUM CHLORIDE 0.9 % (FLUSH) 0.9 %
1000 SYRINGE (ML) INJECTION ONCE
Refills: 0 | Status: DISCONTINUED | OUTPATIENT
Start: 2024-06-13 | End: 2024-06-14

## 2024-06-13 RX ADMIN — INSULIN GLARGINE 15 UNIT(S): 100 INJECTION, SOLUTION SUBCUTANEOUS at 08:01

## 2024-06-13 RX ADMIN — Medication 360 MILLIGRAM(S): at 06:25

## 2024-06-13 RX ADMIN — APIXABAN 5 MILLIGRAM(S): 2.5 TABLET, FILM COATED ORAL at 06:25

## 2024-06-13 RX ADMIN — TIOTROPIUM BROMIDE 2 PUFF(S): 18 CAPSULE ORAL; RESPIRATORY (INHALATION) at 06:24

## 2024-06-13 RX ADMIN — OXYCODONE HYDROCHLORIDE 10 MILLIGRAM(S): 5 TABLET ORAL at 06:24

## 2024-06-13 RX ADMIN — Medication 250 MILLIGRAM(S): at 06:59

## 2024-06-13 RX ADMIN — Medication 325 MILLIGRAM(S): at 06:25

## 2024-06-13 RX ADMIN — Medication 1 GRAM(S): at 06:25

## 2024-06-13 RX ADMIN — Medication 25 MILLIGRAM(S): at 06:25

## 2024-06-13 RX ADMIN — Medication 20 MILLIGRAM(S): at 06:26

## 2024-06-13 RX ADMIN — Medication 20 MILLIEQUIVALENT(S): at 06:25

## 2024-06-13 RX ADMIN — PANTOPRAZOLE SODIUM 40 MILLIGRAM(S): 20 TABLET, DELAYED RELEASE ORAL at 06:26

## 2024-06-13 RX ADMIN — ONDANSETRON 4 MILLIGRAM(S): 8 TABLET, FILM COATED ORAL at 08:00

## 2024-06-13 RX ADMIN — Medication 3 UNIT(S): at 08:01

## 2024-06-13 RX ADMIN — GABAPENTIN 300 MILLIGRAM(S): 400 CAPSULE ORAL at 06:26

## 2024-06-13 RX ADMIN — Medication 10 MILLIGRAM(S): at 06:30

## 2024-06-13 RX ADMIN — Medication 1 MILLIGRAM(S): at 23:18

## 2024-06-13 RX ADMIN — Medication 20 MILLIGRAM(S): at 06:27

## 2024-06-13 RX ADMIN — Medication 100 MILLIGRAM(S): at 06:25

## 2024-06-13 RX ADMIN — Medication 4: at 08:00

## 2024-06-13 RX ADMIN — Medication 5 MILLIGRAM(S): at 06:27

## 2024-06-13 NOTE — ED ADULT TRIAGE NOTE - ARRIVAL FROM
Discharge Summary    Patient ID:  Alicia Cai  [id number]  83 year old  6/14/1936    Admit date: 6/10/2019    Discharge date:  6/17/2019      Discharge disposition: Home with home health care    Admitting physician: Benigno Lang MD     Discharge physician: Sun Perez MD    Primary diagnoses:   Principal Problem:    Acute on chronic anemia  Active Problems:    ESRD (end stage renal disease) on dialysis (CMS/HCC)    Liver cirrhosis secondary to BEVERLY (CMS/HCC)    Pancytopenia (CMS/HCC)    Chronic respiratory failure (CMS/HCC)    Lower GI bleed    Severe malnutrition (CMS/HCC)      Secondary diagnoses:  Past Medical History:   Diagnosis Date   • Atrial fibrillation (CMS/HCC)    • Chronic kidney disease    • COPD (chronic obstructive pulmonary disease) (CMS/HCC)    • Diabetes mellitus (CMS/HCC)    • Essential (primary) hypertension    • Myocardial infarction (CMS/HCC)    • Pneumonia    • RAD (reactive airway disease)    • Thyroid condition    • Urinary incontinence            Hospital Course By Problem List (see H&P for details of admission):     1. Bright red blood per rectum  - Status post colonoscopy on 6/11 multiple polyps removed, rectal varices with no evidence of bleeding reported, but continued to have intermittent bright red blood per rectum.  - S/P EGD and repeat rectosygmoidoscopy on 6/13 to treat any potential AVMs/bleeders  - on recto sigmoidoscopy, 5-6 mm superficial area of ulceration with a pigmented red spot in its base was noted, which is suspicious for a potential lower GI bleeding site.the base of the ulceration was treated with APC ablation with good hemostasis and 3 endoscopic clips were placed across the ulcer base prophylactically  - On EGD,  small 1+/4 non-bleeding esophageal varices; diffuse portal hypertensive gastropathy involving the cardia, fundus and body and gastric antral vascular ectasia were noted and  successfully treated with APC ablation. Hb now stable. No longer having BRBPR.       2. History of esophageal varices: See above.     3. Non-alcoholic steatohepatitis/cirrhosis  - PO protonix BID.  - Resumed all her oral meds including rifaximin and lactulose  - Stable at this time  - EGD as above. Obtain ultrasound that showed limited ascites. No need for acute paracentesis at this point in time. Advised patient to follow-up with her primary care provider who can arrange for as needed paracentesis in the future     4. End-stage renal disease  - Dialysis MWF. Next HD on Monday. Appreciate nephrology input. Patient to get hemodialysis tomorrow prior to discharge     5. Acute on chronic anemia multifactorial  - Acute component likely due to GI bleed. This is mild  - Chronic components due to end-stage renal disease, liver cirrhosis and possible chronic small GI bleeds due to portal hypertension  - Her hemoglobin is stable  - Got Venofer on 6/11with dialysis. Also getting Epogen with HD. Hemoglobin stable.     6. Hypertension  - Her blood pressure is on the lower side here  - On low-dose metoprolol.      7 persistent cough  - According to patient, this has been going on for at least 6 weeks  - No evidence of pneumonia  - Likely postnasal drip versus gastroesophageal reflux disease  - Prescribed Flonase, already on Protonix.         8. Nausea  -New and persistent.   - Did not respond to zofran and reglan. Discharge cancelled because of that  - Continue to treat symptomatically with before meal scheduled zofran and started on PRN Levasin. Nausea improved. I suspect that this is secondary to compression effect on the stomach from hepatosplenomegaly. Recommended small frequent meals. patient tolerating diet well. Discharge home with p.r.n. prior to meal Zofran.      9: Diabetes mellitus type 2 with hyperglycemia: Continue with home regiment upon discharge.      Discharge Medications:     Summary of your Discharge Medications      Take these Medications      Details   * albuterol 108 (90 Base) MCG/ACT  inhaler   Inhale 2 puffs into the lungs every 6 hours as needed for Shortness of Breath or Wheezing.     * albuterol (2.5 MG/3ML) 0.083% nebulizer solution  Commonly known as:  VENTOLIN   Take 2.5 mg by nebulization every 6 hours as needed for Wheezing.     allopurinol 100 MG tablet  Commonly known as:  ZYLOPRIM   Take 2 tablets (= 200mg) by mouth every morning for gout     budesonide 0.5 MG/2ML nebulizer suspension  Commonly known as:  PULMICORT   Take 0.5 mg by nebulization 2 times daily.     cycloSPORINE 0.05 % ophthalmic emulsion  Commonly known as:  RESTASIS   Place 1 drop into both eyes 2 times daily.     digoxin 0.125 MG tablet  Commonly known as:  LANOXIN   Take 1/2 tablets (=62.5 mcg) by mouth daily     erythromycin ophthalmic ointment  Commonly known as:  ILOTYCIN   Place 0.25 inches into both eyes nightly.     folic acid 400 MCG tablet  Commonly known as:  FOLATE   Take 400 mcg by mouth daily.     GARLIC PO   Take one soft gel by mouth daily     GINSENG PO   Take one capsule by mouth daily.     HAIR/SKIN/NAILS PO   Take one capsule by mouth daily     HUMALOG KWIKPEN 100 UNIT/ML pen-injector   Generic drug:  insulin lispro  Inject per sliding scale after meals and at bedtime.   SS: 0-179 = 0 units  180-219 = 1 unit  220-259 = 3 units  260-299 = 4 units  300-339 = 5 units  340-379 = 7 units  380 or greater = 8 units, call MD  Max daily dose 28 units     hydrOXYzine 25 MG tablet  Commonly known as:  ATARAX   Take 25 mg by mouth daily.     Hyoscyamine Sulfate SL 0.125 MG SL Tab   Place 0.125 mg under the tongue every 4 hours as needed for Cramping.     ICAPS AREDS 2 Cap   Take 1 capsule by mouth daily.     insulin detemir 100 UNIT/ML pen-injector  Commonly known as:  LEVEMIR FLEXTOUCH   Inject 9 units into the skin daily in the morning and 6 units at bedtime     lactulose 10 GM/15ML solution  Commonly known as:  CHRONULAC   Take 15 ml (=10 g) by mouth 1-2 times daily on Tuesday, Thursday, Saturday, Sunday  (non-dialysis days)     latanoprost 0.005 % ophthalmic solution  Commonly known as:  XALATAN   Place 1 drop into both eyes nightly.     levothyroxine 300 MCG tablet  Commonly known as:  SYNTHROID, LEVOTHROID   Take 300 mcg by mouth daily.     loperamide 2 MG tablet  Commonly known as:  IMODIUM A-D   Take one half tablet (= 1 mg) by mouth prior to dialysis on Monday, Wednesday, Friday     melatonin 3 MG   Take 3 mg by mouth nightly.     metoPROLOL succinate 25 MG 24 hr tablet  Commonly known as:  TOPROL-XL   Take 1/2 tablet by mouth twice daily     mirtazapine 15 MG tablet  Commonly known as:  REMERON   Take 15 mg by mouth nightly.     nitroGLYcerin 0.4 MG sublingual tablet  Commonly known as:  NITROSTAT   Place 0.4 mg under the tongue every 5 minutes as needed for Chest pain.     ondansetron 4 MG disintegrating tablet  Commonly known as:  ZOFRAN ODT   Place 1 tablet onto the tongue 3 times daily (before meals).     oxyCODONE (IMM REL) 5 MG immediate release tablet  Commonly known as:  ROXICODONE   Take 1/2 tablet by mouth up to 4 times daily as needed for pain on non-dialysis days (Tu, Th, Sa, Su)     pantoprazole 40 MG tablet  Commonly known as:  PROTONIX   Take 40 mg by mouth daily.     pregabalin 25 MG capsule  Commonly known as:  LYRICA   Take 1 capsule twice daily     PROBIOTIC ACIDOPHILUS PO   Take 1 capsule by mouth daily.     rifAXIMin 550 MG Tab  Commonly known as:  XIFAXAN   Take 1 tablet by mouth 2 times daily.     sucralfate 1 g tablet  Commonly known as:  CARAFATE   Take 1 g by mouth 4 times daily.     SYSTANE OP   Place one drop in each eye twice a day     zinc sulfate 220 (50 Zn) MG capsule  Commonly known as:  ZINCATE   Take 1 capsule by mouth 2 times daily (with meals).         * This list has 2 medication(s) that are the same as other medications prescribed for you. Read the directions carefully, and ask your doctor or other care provider to review them with you.                Consults:      Procedures performed: Ct Abdomen Pelvis    Result Date: 6/10/2019  CT ABDOMEN PELVIS W CONTRAST CLINICAL INFORMATION: ABDOMINAL PAIN COMPARISON: None. TECHNIQUE: Helical axial CT images of the abdomen and pelvis were obtained with 100 mL Isovue 300 intravenous contrast.    FINDINGS:    Lung bases: No significant lung consolidation or pleural effusion. The heart is enlarged. Liver: Moderate perihepatic ascites is present. The liver is shrunken and mildly nodular contour. 1.7 cm hepatic cyst is seen within the right hepatic lobe. Tortuosity and ectasia of the portal venous and splenic vasculature is noted. Gallbladder and biliary system: The gallbladder is surgically absent. Pancreas: Negative. Spleen: The spleen is enlarged measuring at least 16 cm in craniocaudal dimension. Mild perisplenic ascites is noted. Adrenal Glands: Negative. Genitourinary system: Moderate bilateral renal atrophy is present. 1.9 cm cyst is present within the right kidney. There is no hydronephrosis or nephrolithiasis.         Urinary bladder is compressed but otherwise unremarkable in appearance. Bowel: Mild interloop ascites and mesenteric congestion is present. There are scattered areas of borderline colonic and small bowel thickening which is likely reactive. There is no evidence for bowel obstruction. Mild subjective colonic stool burden is present. There are a few scattered colonic diverticula without inflammatory component. The appendix is surgically absent. Hyperdense luminal material is seen within the lower rectum and mid small bowel within the left lateral abdomen. Miscellaneous: Small volume ascites is seen within the bilateral paracolic gutters and extending into the pelvis. No free intraperitoneal air or significant abdominal ascites. Lymph nodes: Negative. Retroperitoneum: No evidence of aortic aneurysm. Moderate to heavy aortoiliac atherosclerosis is present. Pelvis: Small volume fluid is present within the pelvis and  there is mild perirectal haziness. Uterus is not visualized and may be surgically absent or markedly atrophic. Osseous structures: There is diffuse osteopenia and age-related senescent degenerative change. Moderate levoscoliosis of the lumbar spine is present. No acute fracture is identified.        IMPRESSION: 1. Nonspecific hyperdense material interspersed with luminal contents within the mid small bowel in the left lateral abdomen and within the lower rectum. This may represent blood products in the setting of known GI bleed or ingested hyperdense material. Evaluation for source of GI bleed can be further evaluated with nuclear medicine GI bleed scan. 2. Imaging stigmata of cirrhosis and portal hypertension with small volume intra-abdominal ascites. 3. Small 1.7 cm hepatic cyst. 4. Moderate bilateral renal atrophy and 1.9 cm right renal cyst.     Xr Chest Pa And Lateral    Result Date: 6/11/2019  EXAM: XR CHEST PA AND LATERAL DATE OF EXAM: 6/11/2019, 3:49 PM. COMPARISON: 5/21/2019. CLINICAL INFORMATION: CKD 5, requiring dialysis.  FINDINGS: The heart is upper limits of normal in size. Pulmonary vessels are within the limits of normal. Calcified hilar and mediastinal lymph nodes are evident, consistent with previous granulomatous infection. No focal area of infiltration is evident. There is no effusion.     IMPRESSION: No evidence of acute failure or infiltrate.       Discharge Exam    Blood pressure 121/56, pulse 68, temperature 97.3 °F (36.3 °C), temperature source Oral, resp. rate 18, height 5' 1\" (1.549 m), weight 54.2 kg, SpO2 100 %.    General: Patient alert but resting comfortably in the bed during dialysis. Able to answer questions appropriately  Lungs: Clear to auscultation without wheezing rales or rhonchi's  Heart: RRR.   Abdomen: Bowel sounds positive abdomen is distended no fluid shift on palpation. Nontender to palpation  Extremities: Non-tender.     Activity: As tolerated.   Diet: Consistent carb  diet.     Code Status: Full Resuscitation    Pending issues to be followed up by primary care provider    None.     Follow-up with:    Js Escobar DO  [address] [facility] [id number]  [phone]    In 1 week  Your appointment is scheduled for THURSDAY, JUNE 20th at 1:30PM.      Time spent on discharge was greater than 30 minutes.    Signed:    Sun Perez MD  6/17/2019  5:04 PM        For the first 24 hours after discharge any questions pertaining to the current discharge can be directed to the Hospitalist service. Please contact by pager [phone].      Nursing home

## 2024-06-13 NOTE — ED PROVIDER NOTE - CONSTITUTIONAL, MLM
normal... intubated moving four extremities, reaching for the ET Tube,  no focal deficits, no motor or sensory deficits, in moderate distress.

## 2024-06-13 NOTE — PROGRESS NOTE ADULT - PROVIDER SPECIALTY LIST ADULT
Cardiology
Endocrinology
Endocrinology
Family Medicine
Infectious Disease
Cardiology
Endocrinology
Infectious Disease
Anesthesia
Cardiology
Endocrinology
Infectious Disease
Podiatry
Cardiology
Endocrinology
Infectious Disease
Infectious Disease
Endocrinology
Endocrinology
Family Medicine
Hospitalist
Hospitalist
Family Medicine
Hospitalist
Family Medicine
Hospitalist
Family Medicine
Family Medicine
Hospitalist
Family Medicine

## 2024-06-13 NOTE — PROGRESS NOTE ADULT - ASSESSMENT
53M w/ PMHx of Afib, CAD s/p stents, CVA, DM, HTN, pulmonary embolism on Eliquis,  osteomyelitis s/p debridement presenting  presents to emergency department from [facility] for R heel ulcer debridement.    Afib, CAD, stents, HTN, PE on ELiquis  - Known  Afib, initially RVR but has been rate-controlled   - Continue Cardizem 360mg CD and Toprol 100 mg daily   - Continue Eliquis     - 3/18/24: Technically difficult ECHO w/ normal LV size and function EF 50-55%, mildly reduced RV systolic function, mod dilated LA and RA, mod to sev MR, mild to mod TR, PASP 36  - Euvolemic on exam with no O2 requirement  - Continue torsemide PO q12H.  Tolerated by renal function    - EKG showed A fib RVR, PVC @ 127.   - No evidence of any active ischemia   - Continue statin and ASA    - BP stable and controlled     - S/p surgical debridement of the right heel with bone biopsy, 6/4  - Tolerated procedure well, cardiac status optimal post operatively   - Pain control per Primary    - Monitor and replete lytes, keep K>4, Mg>2.  - Will continue to follow.    Danny Arce, MS FNP, AGACNP  Nurse Practitioner- Cardiology   Please call on TEAMS

## 2024-06-13 NOTE — PROGRESS NOTE ADULT - SUBJECTIVE AND OBJECTIVE BOX
[facility] -- Brittany Lutz Pannella, Patel, Savella, Goodger, Cohen: Office # [phone]    Follow Up:   AFib, CAD, Cardiac Optimization    Subjective/Observations: Patient awake, alert, resting in bed. Denies chest pain, palpitations and dizziness. Denies any difficulty breathing. No orthopnea and PND. Tolerating room air. Scattered blisters in body     REVIEW OF SYSTEMS: All other review of systems are negative unless indicated above    PAST MEDICAL & SURGICAL HISTORY:  Diabetes      Diabetes mellitus with no complication      Afib      Hypertension      Hypertension      BPH (benign prostatic hyperplasia)      Perforated gastric ulcer  s/p emergent ex-lap omentopexy and plication 6/2019      Pulmonary embolism      History of non-ST elevation myocardial infarction (NSTEMI)      Osteomyelitis  s/p debridement      CAD S/P percutaneous coronary angioplasty      Cerebrovascular accident      H/O abdominal surgery      Perforated gastric ulcer      Traumatic amputation of left foot, initial encounter    MEDICATIONS  (STANDING):  apixaban 5 milliGRAM(s) Oral every 12 hours  ascorbic acid 500 milliGRAM(s) Oral daily  aspirin enteric coated 81 milliGRAM(s) Oral daily  atorvastatin 40 milliGRAM(s) Oral at bedtime  chlorhexidine 4% Liquid 1 Application(s) Topical <User Schedule>  cholecalciferol 1000 Unit(s) Oral daily  dextrose 50% Injectable 12.5 Gram(s) IV Push once  dextrose 50% Injectable 25 Gram(s) IV Push once  dextrose Oral Gel 15 Gram(s) Oral once  diltiazem    milliGRAM(s) Oral daily  ferrous    sulfate 325 milliGRAM(s) Oral two times a day  gabapentin 300 milliGRAM(s) Oral every 12 hours  glucagon  Injectable 1 milliGRAM(s) IntraMuscular once  insulin lispro (ADMELOG) corrective regimen sliding scale   SubCutaneous at bedtime  insulin lispro (ADMELOG) corrective regimen sliding scale   SubCutaneous three times a day before meals  insulin lispro Injectable (ADMELOG) 5 Unit(s) SubCutaneous three times a day before meals  loratadine 10 milliGRAM(s) Oral daily  melatonin 5 milliGRAM(s) Oral at bedtime  metoclopramide Injectable 5 milliGRAM(s) IV Push three times a day  metoprolol succinate  milliGRAM(s) Oral daily  multivitamin/minerals 1 Tablet(s) Oral daily  naloxegol 25 milliGRAM(s) Oral daily  oxybutynin 10 milliGRAM(s) Oral two times a day  oxyCODONE    IR 10 milliGRAM(s) Oral two times a day  pantoprazole    Tablet 40 milliGRAM(s) Oral before breakfast  polyethylene glycol 3350 17 Gram(s) Oral daily  potassium chloride    Tablet ER 20 milliEquivalent(s) Oral three times a day  predniSONE   Tablet 20 milliGRAM(s) Oral daily  senna 2 Tablet(s) Oral at bedtime  sucralfate 1 Gram(s) Oral two times a day  tiotropium 2.5 MICROgram(s) Inhaler 2 Puff(s) Inhalation daily  torsemide 20 milliGRAM(s) Oral two times a day  vancomycin  IVPB 750 milliGRAM(s) IV Intermittent every 12 hours    MEDICATIONS  (PRN):  acetaminophen     Tablet .. 650 milliGRAM(s) Oral every 6 hours PRN Temp greater or equal to 38C (100.4F), Mild Pain (1 - 3)  aluminum hydroxide/magnesium hydroxide/simethicone Suspension 30 milliLiter(s) Oral every 4 hours PRN Dyspepsia  diphenhydrAMINE 25 milliGRAM(s) Oral every 4 hours PRN Rash and/or Itching  morphine  - Injectable 2 milliGRAM(s) IV Push every 6 hours PRN Severe Pain (7 - 10)  ondansetron Injectable 4 milliGRAM(s) IV Push every 8 hours PRN Nausea and/or Vomiting  sodium chloride 0.65% Nasal 1 Spray(s) Both Nostrils three times a day PRN Nasal Congestion  sodium chloride 0.9% lock flush 10 milliLiter(s) IV Push every 1 hour PRN Pre/post blood products, medications, blood draw, and to maintain line patency    Allergies    ertapenem (Blisters; Rash)  fish (Hives)    Intolerances      Vital Signs Last 24 Hrs  T(C): 36.8 (13 Jun 2024 10:55), Max: 37.1 (13 Jun 2024 06:16)  T(F): 98.2 (13 Jun 2024 10:55), Max: 98.8 (13 Jun 2024 06:16)  HR: 71 (13 Jun 2024 10:55) (71 - 83)  BP: 103/68 (13 Jun 2024 10:55) (103/68 - 112/64)  BP(mean): --  RR: 20 (13 Jun 2024 10:55) (17 - 20)  SpO2: 92% (13 Jun 2024 10:55) (91% - 93%)    Parameters below as of 13 Jun 2024 10:55  Patient On (Oxygen Delivery Method): room air      I&O's Summary    12 Jun 2024 07:01  -  13 Jun 2024 07:00  --------------------------------------------------------  IN: 250 mL / OUT: 2440 mL / NET: -2190 mL    13 Jun 2024 07:01  -  13 Jun 2024 14:36  --------------------------------------------------------  IN: 0 mL / OUT: 400 mL / NET: -400 mL        TELE: Not on telemetry   Constitutional: NAD, awake and alert, obese  HEENT: Moist Mucous Membranes, Anicteric  Pulmonary: Non-labored, breath sounds are clear bilaterally, No wheezing, rales or rhonchi  Cardiovascular: IRRR, S1 and S2, + murmurs, rubs, gallops or clicks  Gastrointestinal: Bowel Sounds present, soft, nontender.   Lymph: No peripheral edema. No lymphadenopathy.  Skin: +generalized rashes.  B/L foot ulcers with dressing dry and intact  Psych:  Mood & affect appropriate      LABS: All Labs Reviewed:                        12.6   12.49 )-----------( 307      ( 11 Jun 2024 14:25 )             37.9              12 Lead ECG:   Ventricular Rate 127 BPM    QRS Duration 82 ms    Q-T Interval 276 ms    QTC Calculation(Bazett) 401 ms    R Axis 13 degrees    T Axis 5 degrees    Diagnosis Line Atrial fibrillation with rapid ventricular response with premature ventricular or aberrantly conducted complexes  Low voltage QRS  Cannot rule out Anterior infarct , age undetermined  Abnormal ECG    Confirmed by darien France ([id number]) on 5/28/2024 2:53:37 PM (05-28-24 @ 11:53)      TRANSTHORACIC ECHOCARDIOGRAM REPORT  ________________________________________________________________________________                                      _______       Pt. Name:       SARAH MORRISON Study Date:    3/18/2024  MRN:            [id number]         YOB: 1970  Accession #:    [id number]        Age:           53 years  Account#:       [id number]       Gender:        M  Heart Rate:                      Height:        74.02 in (188.00 cm)  Rhythm:                          Weight:        244.71 lb (111.00 kg)  Blood Pressure: 100/60 mmHg      BSA/BMI:       2.37 m² / 31.41 kg/m²  ________________________________________________________________________________________  Referring Physician:    [id number] Hill Brizuela  Interpreting Physician: Mary Maya MD  Primary Sonographer:    Mounika FELDMAN    CPT:               ECHO TTE WO CON COMP W DOPP - 51021.m  Indication(s):     Dyspnea, unspecified - R06.00  Procedure:         Transthoracic echocardiogram with 2-D, M-mode and complete                     spectral and color flow Doppler.  Ordering Location: [facility]  Admission Status:  Inpatient  Study Information: Image quality for this study is technically difficult.    _______________________________________________________________________________________     CONCLUSIONS:      1. Technically difficult image quality.   2. Left ventricular cavity is normal in size. Left ventricular wall thickness is normal. Left ventricular systolic function is normal with an ejection fraction visually estimated at 50 to 55 %.   3. Normal right ventricular cavity size, with normal wall thickness, and mildly reduced systolic function.   4. The left atrium is moderately dilated.   5. The right atrium is moderately dilated.   6. Moderate to severe mitral regurgitation.   7. Mild to moderate tricuspid regurgitation.   8. Estimated pulmonary artery systolic pressure is 36 mmHg, consistent with mild pulmonary hypertension.   9. The inferior vena cava is dilated measuring 2.40 cm in diameter, (dilated >2.1cm) with normal inspiratory collapse (normal >50%) consistent with mildly elevated right atrial pressure (~8, range 5-10mmHg).  10. Trace pericardial effusion.    ________________________________________________________________________________________  FINDINGS:     Left Ventricle:  The left ventricular cavity is normal in size. Left ventricular wall thickness is normal. Left ventricular systolic function is normal with an ejection fraction visually estimated at 50 to 55%.The left ventricular diastolic function is indeterminate.     Right Ventricle:  The right ventricular cavity is normal in size, with normal wall thickness and mildly reduced systolic function.     Left Atrium:  The left atrium is moderately dilated.     Right Atrium:  The right atrium is moderately dilated.     Aortic Valve:  The aortic valve anatomy cannot be determined with normal systolic excursion. There is no aortic valve stenosis.     Mitral Valve:  There is mild calcification of the mitralvalve annulus. There is moderate to severe mitral regurgitation.     Tricuspid Valve:  Structurally normal tricuspid valve with normal leaflet excursion. There is mild to moderate tricuspid regurgitation. Estimated pulmonary artery systolic pressure is 36 mmHg, consistent with mild pulmonary hypertension.     Pulmonic Valve:  The pulmonic valve was not well visualized. There is trace pulmonic regurgitation.     Pericardium:  There is a trace pericardial effusion.     Systemic Veins:  The inferiorvena cava is dilated measuring 2.40 cm in diameter, (dilated >2.1cm) with normal inspiratory collapse (normal >50%) consistent with mildly elevated right atrial pressure (~8, range 5-10mmHg).  ____________________________________________________________________  QUANTITATIVE DATA:  Left Ventricle Measurements: (Indexed to BSA)     IVSd (2D):   1.9 cm  LVPWd (2D):  1.5 cm  LVIDd (2D):  5.3 cm  LVIDs (2D):  3.9 cm  LV Mass:     413 g  174.4 g/m²  Visualized LV EF%: 50 to 55%     MV E Vmax:    1.15 m/s  e' lateral:   10.90 cm/s  e' medial:    10.50 cm/s  E/e' lateral: 10.56  E/e' medial:  12.00  E/e' Average: 10.76  MV DT:        137 msec    Aorta Measurements: (Normal range) (Indexed to BSA)     Sinuses of Valsalva: 3.50 cm (3.1 - 3.7 cm)       Left Atrium Measurements: (Indexed to BSA)  LA Diam 2D: 3.99 cm       LVOT / RVOT/ Qp/Qs Data: (Indexed to BSA)  LVOT Diameter: 2.22 cm    Mitral Valve Measurements:     MV E Vmax: 1.2 m/s       Tricuspid Valve Measurements:     TR Vmax:          2.6m/s  TR Peak Gradient: 27.7 mmHg  RA Pressure:      8 mmHg  PASP:             36 mmHg    ________________________________________________________________________________________  Electronically signed on 3/19/2024 at 11:27:59 AM by Mary Maya MD         *** Final ***

## 2024-06-13 NOTE — ED PROVIDER NOTE - MUSCULOSKELETAL, MLM
Spine appears normal, range of motion is not limited, no muscle or joint tenderness wounds bandaged of both lower extremities

## 2024-06-13 NOTE — PROGRESS NOTE ADULT - SUBJECTIVE AND OBJECTIVE BOX
[facility] DIV[facility] of INFECTIOUS DISEASE  Juventino Whitten MD PhD, Symone Hickey MD, Anne-Marie Li MD, Jeffery Jacobs MD, Ryan Romeo MD  and providing coverage with Melody Armstrong MD  Providing Infectious Disease Consultations at [facility], [facility], [facility]'s    Office# [phone] to schedule follow up appointments  Answering Service for urgent calls or New Consults [phone]  Cell# to text for urgent issues Juventino Whitten [phone]     infectious diseases progress note:    SARAH MORRISON is a 53y y. o. Male patient    Overnight and events of the last 24hrs reviewed    Allergies    ertapenem (Blisters; Rash)  fish (Hives)    Intolerances        ANTIBIOTICS/RELEVANT:  antimicrobials  vancomycin  IVPB 750 milliGRAM(s) IV Intermittent every 12 hours    immunologic:    OTHER:  acetaminophen     Tablet .. 650 milliGRAM(s) Oral every 6 hours PRN  aluminum hydroxide/magnesium hydroxide/simethicone Suspension 30 milliLiter(s) Oral every 4 hours PRN  apixaban 5 milliGRAM(s) Oral every 12 hours  ascorbic acid 500 milliGRAM(s) Oral daily  aspirin enteric coated 81 milliGRAM(s) Oral daily  atorvastatin 40 milliGRAM(s) Oral at bedtime  chlorhexidine 4% Liquid 1 Application(s) Topical <User Schedule>  cholecalciferol 1000 Unit(s) Oral daily  dextrose 50% Injectable 12.5 Gram(s) IV Push once  dextrose 50% Injectable 25 Gram(s) IV Push once  dextrose Oral Gel 15 Gram(s) Oral once  diltiazem    milliGRAM(s) Oral daily  diphenhydrAMINE 25 milliGRAM(s) Oral every 4 hours PRN  ferrous    sulfate 325 milliGRAM(s) Oral two times a day  gabapentin 300 milliGRAM(s) Oral every 12 hours  glucagon  Injectable 1 milliGRAM(s) IntraMuscular once  insulin lispro (ADMELOG) corrective regimen sliding scale   SubCutaneous at bedtime  insulin lispro (ADMELOG) corrective regimen sliding scale   SubCutaneous three times a day before meals  insulin lispro Injectable (ADMELOG) 5 Unit(s) SubCutaneous three times a day before meals  loratadine 10 milliGRAM(s) Oral daily  melatonin 5 milliGRAM(s) Oral at bedtime  metoclopramide Injectable 5 milliGRAM(s) IV Push three times a day  metoprolol succinate  milliGRAM(s) Oral daily  morphine  - Injectable 2 milliGRAM(s) IV Push every 6 hours PRN  multivitamin/minerals 1 Tablet(s) Oral daily  naloxegol 25 milliGRAM(s) Oral daily  ondansetron Injectable 4 milliGRAM(s) IV Push every 8 hours PRN  oxybutynin 10 milliGRAM(s) Oral two times a day  oxyCODONE    IR 10 milliGRAM(s) Oral two times a day  pantoprazole    Tablet 40 milliGRAM(s) Oral before breakfast  polyethylene glycol 3350 17 Gram(s) Oral daily  potassium chloride    Tablet ER 20 milliEquivalent(s) Oral three times a day  predniSONE   Tablet 20 milliGRAM(s) Oral daily  senna 2 Tablet(s) Oral at bedtime  sodium chloride 0.65% Nasal 1 Spray(s) Both Nostrils three times a day PRN  sodium chloride 0.9% lock flush 10 milliLiter(s) IV Push every 1 hour PRN  sucralfate 1 Gram(s) Oral two times a day  tiotropium 2.5 MICROgram(s) Inhaler 2 Puff(s) Inhalation daily  torsemide 20 milliGRAM(s) Oral two times a day      Objective:  Vital Signs Last 24 Hrs  T(C): 36.8 (13 Jun 2024 10:55), Max: 37.1 (13 Jun 2024 06:16)  T(F): 98.2 (13 Jun 2024 10:55), Max: 98.8 (13 Jun 2024 06:16)  HR: 71 (13 Jun 2024 10:55) (71 - 84)  BP: 103/68 (13 Jun 2024 10:55) (101/62 - 112/64)  BP(mean): --  RR: 20 (13 Jun 2024 10:55) (17 - 20)  SpO2: 92% (13 Jun 2024 10:55) (91% - 93%)    Parameters below as of 13 Jun 2024 10:55  Patient On (Oxygen Delivery Method): room air        T(C): 36.8 (06-13-24 @ 10:55), Max: 37.2 (06-11-24 @ 11:43)  T(C): 36.8 (06-13-24 @ 10:55), Max: 37.2 (06-11-24 @ 11:43)  T(C): 36.8 (06-13-24 @ 10:55), Max: 37.2 (06-09-24 @ 11:50)    PHYSICAL EXAM:  HEENT: NC atraumatic  Neck: supple  Respiratory: no accessory muscle use, breathing comfortably  Cardiovascular: distant  Gastrointestinal: normal appearing, nondistended  Extremities: no clubbing, no cyanosis,  Skin: multiple fluid filled bullae on arms and a new on abdomen      LABS:                          12.6   12.49 )-----------( 307      ( 11 Jun 2024 14:25 )             37.9       WBC  12.49 06-11 @ 14:25  12.51 06-10 @ 06:42  11.16 06-08 @ 06:50  12.07 06-07 @ 12:50              Creatinine: 1.30 mg/dL (06-10-24 @ 06:42)                INFLAMMATORY MARKERS      MICROBIOLOGY:              RADIOLOGY & ADDITIONAL STUDIES:

## 2024-06-13 NOTE — PROGRESS NOTE ADULT - REASON FOR ADMISSION
ankle wound
heel wound
ankle wound
heel wound
ankle wound
heel wound

## 2024-06-13 NOTE — H&P ADULT - HISTORY OF PRESENT ILLNESS
Chart, labs and reports reviewed.  
Known from rehab  Claude Villareal is a 52 YO M with past medical history of  AF on Eliquis, indwelling Aguliera, CAD s/p stents, CVA, DMT 2, Hypertension, osteomyelitis s/p debridement, PE, transferred from [facility] for difficulty in breathing.  His recent admission was for osteomyelitis and discharged on IV Vancomycin. On ED arrival he is unresponsive, apneic, no palpable pulse or blood pressure, EKG rhythm HR < 20/min W/O P waves. Patient intubated, Code ACLS activated, IV epinephrine, IV atropine administered, pulse became palpable, still Hypotensive, IV levophed administered. Admitted to ICU consulted,  Patient woke up and agitated try to pull out ETT,  IV propofol started,  Patient had no nausea or vomiting in ELIAS.

## 2024-06-13 NOTE — PROGRESS NOTE ADULT - PROBLEM SELECTOR PROBLEM 1
Diabetes
Heel ulcer due to DM
Diabetes
Heel ulcer due to DM
Diabetes
Heel ulcer due to DM

## 2024-06-13 NOTE — PROGRESS NOTE ADULT - PROBLEM SELECTOR PLAN 1
podiatry eval with dr. vasquez   S/P debridement per podiatry  today  cardio eval and clearance appreciated  stop IV Lasix switched to PO Torsemide  cultures noted- cont vancomycin, agree w addition of rocephin
add lantus 10 units qam  cont mod dose admelog corrective scale coverage   goal bg 100-180 in hosp setting  cont cons chodiet
change mod dose admelog corrective scale coverage qac/qhs  cont cons cho diet  goal bg 100-180 in hosp setting
cont mod dose admelog corrective scale coverage qac/qhs  to add standing insulin regimen in post-operative setting  goal bg 100-180 in hosp setting
increase lantus 15 units qam  cont admelog 3 units 3x/day before meals  cont mod dose admelog corrective scale coverage qac/qhs  cont cons cho diet  goal bg 100-180 in hosp setting
cont lantus 10 units qam  add admelog 3 units 3x/day before meals  cont mod dose admelog corrective scale coverage qac/qhs  cont cons cho diet  goal bg 100-180 in hosp setting
increase lantus 20 units qam  increase admelog 5 units 3x/day before meals  cont mod dose admelog corrective scale coverage qac/qhs  cont cons cho diet  goal bg 100-180 in hosp setting
podiatry eval with dr. vasquez   debridement per podiatry planned for Tuesday  cardio eval and clearance appreciated  stop IV Lasix switched to PO Torsemide
Patient seen and evaluated  Discussed condition with patient  Dressings changed at this time b/l  Noted new onset bullae b/l lower extremity  Rec iv abx per Id  Rec cont elevation b/l lower extremity  Wound Care Instructions:  -Keep dressing clean, dry, and intact to the b/l lower extremity  -Apply adaptic, gauze, abd, paul, change every other day   -Monitor for any signs of infection including redness, swelling in the leg above the bandage,   -WB as tolerated in surgical shoe left  -NWB right lower extremity  nausea/vomiting/fever/chills/chest pain/shortness of breath, if any are present patient must report to the emergency department immediately  -Patient is to follow up with Dr. Hale/Dr. Todd/ Dr. Jewell  within 5 days after discharge at [facility]. Please call to make an appointment [phone]
podiatry eval with dr. vasquez   S/P debridement per podiatry  today  cardio eval and clearance appreciated  stop IV Lasix switched to PO Torsemide  cultures noted- cont vanco, agree w addition of rocephin
cont admelog corrective scale coverage qac/qhs  standing mdii/metformin on hold  cont cons cho diet  goal bg 100-180 in hosp setting.
podiatry eval with dr. vasquez  appreciated  S/P debridement per podiatry    cardio eval and clearance appreciated  stop IV Lasix switched to PO Torsemide  cultures noted- cont vancomycin
podiatry eval with dr. vasquez   S/P debridement per podiatry  today  cardio eval and clearance appreciated  stop IV Lasix switched to PO Torsemide  cultures noted- cont vancomycin, agree w addition of rocephin
podiatry eval with dr. vasquez   S/P debridement per podiatry  today  cardio eval and clearance appreciated  stop IV Lasix switched to PO Torsemide
podiatry eval with dr. vasquez  debridement per podiatry  There are no absolute medical contraindications to proposed procedure  cardio eval and clearance appreciated
podiatry eval  debridement per podiatry  There are no absolute medical contraindications to proposed procedure  cardio eval and clearance appreciated
podiatry eval with dr. vasquez   debridement per podiatry planned for Tuesday  cardio eval and clearance appreciated  stop IV Lasix switched to PO Torsemide
podiatry eval  debridement per podiatry  There are no absolute medical contraindications to proposed procedure  cardio eval and clearance appreciated
podiatry eval with dr. vasquez  appreciated  S/P debridement per podiatry    cardio eval and clearance appreciated  stop IV Lasix switched to PO Torsemide  cultures noted- cont vancomycin
podiatry eval with dr. vasquez  appreciated  S/P debridement per podiatry    cardio eval and clearance appreciated  stop IV Lasix switched to PO Torsemide  cultures noted- cont vancomycin
podiatry eval with dr. vasquez   debridement per podiatry planned for Tuesday  cardio eval and clearance appreciated  stop IV Lasix switched to PO Torsemide
podiatry eval with dr. vasuqez   S/P debridement per podiatry  today  cardio eval and clearance appreciated  stop IV Lasix switched to PO Torsemide  cultures noted- cont vanco, agree w addition of rocephin
podiatry eval with dr. vasquez   S/P debridement per podiatry  today  cardio eval and clearance appreciated  stop IV Lasix switched to PO Torsemide  cultures noted- cont vancomycin, agree w addition of rocephin

## 2024-06-13 NOTE — H&P ADULT - ALLERGIC/IMMUNOLOGIC
Take ibuprofen and Tylenol together if necessary for the sprain of your left thumb.  The x-rays are negative wear the splint I am ordering to minimize movement of the thumb to aid in healing over the next couple weeks.  Follow-up with the family physician if further problems  
details…

## 2024-06-13 NOTE — PHARMACOTHERAPY INTERVENTION NOTE - COMMENTS
Vancomycin Dosing Per Pharmacy:  Patient is a 53 year old male being treated for a R heel wound, currently on IV vancomycin 750 mg Q12H.    1. Indication for the vancomycin: R heel wound  2. Requesting Provider: Dr. Anne-Marie Li   3. Current plan and dosing regimen: 750 mg Q12H  4. Day of therapy: 10  5. Cultures: tissue culture 5/26/24: E faecalis (susceptible to vancomycin) and MRSA. Tissue culture 06/04/24: ESBL E coli and E faecalis susceptible to vancomycin  6. Target trough or AUC/BAYLEE: 400-600  7. Day and time level is due:   8. Previous levels: 6/4 (19:31): 22, 6/5 (05:47): 14, 6/6 (20:20): 14, 6/9 (04:58): 14.4, 6/11 (04:30): 15.7, 6/13 (05:54): 17.3  9. Expected 24-hour AUC: 496

## 2024-06-13 NOTE — ED PROVIDER NOTE - NEUROLOGICAL, MLM
intubated moving four extremities, reaching for the ET Tube,  no focal deficits, no motor or sensory deficits.

## 2024-06-13 NOTE — H&P ADULT - NSHPLABSRESULTS_GEN_ALL_CORE
13.6   23.52 )-----------( 320      ( 13 Jun 2024 22:55 )             46.4     PT/INR - ( 13 Jun 2024 22:55 )   PT: 25.9 sec;   INR: 2.27 ratio         PTT - ( 13 Jun 2024 22:55 )  PTT:44.0 sec  CAPILLARY BLOOD GLUCOSE      POCT Blood Glucose.: 240 mg/dL (13 Jun 2024 07:45)
13.7   34.23 )-----------( 446      ( 14 Jun 2024 04:30 )             45.0     14 Jun 2024 13:37    139    |  106    |  50     ----------------------------<  371    3.8     |  19     |  3.00     Ca    8.0        14 Jun 2024 13:37  Phos  0.8       14 Jun 2024 10:05  Mg     1.9       14 Jun 2024 10:05    TPro  6.8    /  Alb  2.7    /  TBili  0.7    /  DBili  x      /  AST  385    /  ALT  435    /  AlkPhos  152    14 Jun 2024 04:30    LIVER FUNCTIONS - ( 14 Jun 2024 04:30 )  Alb: 2.7 g/dL / Pro: 6.8 g/dL / ALK PHOS: 152 U/L / ALT: 435 U/L / AST: 385 U/L / GGT: x           PT/INR - ( 14 Jun 2024 04:30 )   PT: 26.6 sec;   INR: 2.33 ratio         PTT - ( 14 Jun 2024 04:30 )  PTT:42.6 sec  CAPILLARY BLOOD GLUCOSE      POCT Blood Glucose.: 163 mg/dL (14 Jun 2024 16:26)  POCT Blood Glucose.: 303 mg/dL (14 Jun 2024 13:57)  POCT Blood Glucose.: 335 mg/dL (14 Jun 2024 12:52)  POCT Blood Glucose.: 363 mg/dL (14 Jun 2024 11:54)  POCT Blood Glucose.: 396 mg/dL (14 Jun 2024 10:21)  POCT Blood Glucose.: 415 mg/dL (14 Jun 2024 09:20)  POCT Blood Glucose.: 440 mg/dL (14 Jun 2024 08:18)  POCT Blood Glucose.: 415 mg/dL (14 Jun 2024 06:36)  POCT Blood Glucose.: 430 mg/dL (14 Jun 2024 03:07)    CARDIAC MARKERS ( 14 Jun 2024 10:05 )  x     / x     / 35 U/L / x     / x      CARDIAC MARKERS ( 13 Jun 2024 23:55 )  x     / x     / x     / x     / <1.0 ng/mL      Urinalysis Basic - ( 14 Jun 2024 13:37 )    Color: x / Appearance: x / SG: x / pH: x  Gluc: 371 mg/dL / Ketone: x  / Bili: x / Urobili: x   Blood: x / Protein: x / Nitrite: x   Leuk Esterase: x / RBC: x / WBC x   Sq Epi: x / Non Sq Epi: x / Bacteria: x    IMPRESSION:  No CT evidence of acute intracranial pathology.    Please note that CT scan is insensitive for early acute ischemia.  Repeat   CT in 12-24 hours or follow-up MRI is recommended for further evaluation.    Poor dentition with numerous large dentalcaries throughout the upper   teeth.    < end of copied text >    < from: CT Chest No Cont (06.14.24 @ 00:51) >    IMPRESSION:  Numerous mildly distended gas-filled and fluid-filled small bowel loops   with air-fluid levels and no transition point involving the entire small   bowel is most suggestive of ileus.    No ascites or pneumoperitoneum.    Emphysema.      Preliminary report: Motion degraded exam. Emphysema. Mildly dilated loops   of small bowel with gradualtransition, most likely reflecting ileus.   Early/developing obstruction not fully excluded.    < end of copied text >

## 2024-06-13 NOTE — PROGRESS NOTE ADULT - SUBJECTIVE AND OBJECTIVE BOX
CAPILLARY BLOOD GLUCOSE      POCT Blood Glucose.: 240 mg/dL (13 Jun 2024 07:45)  POCT Blood Glucose.: 287 mg/dL (12 Jun 2024 21:59)  POCT Blood Glucose.: 244 mg/dL (12 Jun 2024 17:31)  POCT Blood Glucose.: 291 mg/dL (12 Jun 2024 11:35)      Vital Signs Last 24 Hrs  T(C): 37.1 (13 Jun 2024 06:16), Max: 37.1 (13 Jun 2024 06:16)  T(F): 98.8 (13 Jun 2024 06:16), Max: 98.8 (13 Jun 2024 06:16)  HR: 83 (13 Jun 2024 06:16) (80 - 84)  BP: 107/69 (13 Jun 2024 06:16) (101/62 - 112/64)  BP(mean): --  RR: 17 (13 Jun 2024 06:16) (17 - 18)  SpO2: 91% (13 Jun 2024 06:16) (91% - 93%)    Parameters below as of 13 Jun 2024 06:16  Patient On (Oxygen Delivery Method): room air        General: WN/WD NAD  Respiratory: CTA B/L  CV: RRR, S1S2, no murmurs, rubs or gallops  Abdominal: Soft, NT, ND +BS, Last BM  Extremities: le foot dsg intact             atorvastatin 40 milliGRAM(s) Oral at bedtime  dextrose 50% Injectable 25 Gram(s) IV Push once  dextrose 50% Injectable 12.5 Gram(s) IV Push once  dextrose Oral Gel 15 Gram(s) Oral once  glucagon  Injectable 1 milliGRAM(s) IntraMuscular once  insulin glargine Injectable (LANTUS) 15 Unit(s) SubCutaneous every morning  insulin lispro (ADMELOG) corrective regimen sliding scale   SubCutaneous at bedtime  insulin lispro (ADMELOG) corrective regimen sliding scale   SubCutaneous three times a day before meals  insulin lispro Injectable (ADMELOG) 3 Unit(s) SubCutaneous three times a day before meals  predniSONE   Tablet 20 milliGRAM(s) Oral daily

## 2024-06-13 NOTE — PROGRESS NOTE ADULT - ASSESSMENT
Pt is a 53M w/ PMHx of Afib, CAD, DM presenting for R heel ulcer debridement.   S/p zosyn x7 day course at facility  admitted for debridement  Prior WCx MRSA/E. faecalis from 3/26/24  6/3 Now w/ uptrending WBC and drainage from wound   6/4 S/p Selective debridement of wound 04-Jun-2024 10:52:57  Parviz Todd    6/3 WCx   Numerous Staphylococcus aureus  6/4  TCx MRSA, Escherichia coli ESBL  Right calcaneus bone pathology:  -   Fragments of bone with chronic osteomyelitis.  -   Focal acute osteomyelitis cannot be ruled out.    Recommendations:   C/w vancomycin 1 gram IV q12, goal trough 15-20, dosing per pharmacy protocol and   -with inability to rule out osteo rec 6 weeks so last day 7/18  weekly cbc, CMP, ESR, Vanco troughs  PICC placed 6/7 standard PICC care  Appreciate podiatry/wound care    - now with bullous lesions allergic due to antibx v autoimmune   d/w pt rash has been present for 9 months states had bx and was told contact dermatitis but is disseminated, the bullae new and there are flaccid bullae. Timing is such that this started after start of ertapenem so stopped ertapenem and rec obs off  no great options to cover ESBL but MRSA might be  (discussed with patient that we are not treating the ESBL so if there are any issues with progression then despite being followed out [facility] he should contact me for options-pt aware)    From an ID standpoint no further requirement for inpatient status for the management of ID issues. Fine with discharge from ID standpoint when other medical issues no longer require inpatient care and social issues allow for a safe discharge plan. To schedule an outpatient ID follow up appointment please call our office at [phone]    Thank you for consulting us and involving us in the management of this most interesting and challenging case.  Please call us at [phone] or text me directly on my cell#[phone] with any concerns or further questions.

## 2024-06-13 NOTE — ED PROVIDER NOTE - CLINICAL SUMMARY MEDICAL DECISION MAKING FREE TEXT BOX
This is a 53M with PMH of AF on Eliquis, indwelling Aguilera, CAD s/p stents, CVA, DM, HTN, osteomyelitis s/p debridement, PE, sent from [facility] for difficulty breathing, recent admission was for osteomyelitis, on vancomycin. On arrival to the  ED he was  unresponsive, apneic breathing , no palpable pulse or   blood pressure, EKG rhythm HR < 20/min W/O P waves. patient was intubated, ACLS initiated, IV epinephrine, IV atropine administered, pulse now  palpable, still Hypotensive, IV levophed administered. Vital signs improved . ICU consulted  , patient agitated and pulling at the ET tube,  IV propofol given, Dr Brizuela notified of admission

## 2024-06-13 NOTE — ED PROVIDER NOTE - OBJECTIVE STATEMENT
This is a 53M with PMH of AF on Eliquis, indwelling jacques, CAD s/p stents, CVA, DM, HTN, osteomyelitis s/p debridement, PE, sent from [facility] for elevated BP, SOB, and dislodged jacques. Patient states he was having difficulty breathing yesterday and that he couldn't catch his breath. Denies feeling feverish, cp, abd pain, vomiting, diarrhea. He was found to be febrile to 103.8, lactate 3.1 --> 3.7. He was also found to be in DEIDRE. He received iv cardizem 10mg ivp x 1 and then was started on iv cardizem 10mg/hr gtt. It was dropped to 5mg/hr overnight due to hypotension. The patient was digoxin loaded due to hypotension and persistent AF with RVR in 140s-150s. This is a 53M with PMH of AF on Eliquis, indwelling Aguilera, CAD s/p stents, CVA, DM, HTN, osteomyelitis s/p debridement, PE, sent from [facility] for difficulty breathing, recent admission was for osteomyelitis, on vancomycin. On arrival to the  ED he was  unresponsive, apneic breathing , no palpable pulse or   blood pressure, EKG rhythm HR < 20/min W/O P waves. patient was intubated, ACLS initiated, IV epinephrine, IV atropine administered, pulse now  palpable, still Hypotensive, IV levophed administered. Vital signs improved . ICU consulted  , patient agitated and pulling at the ET tube,  IV propofol given, Dr Brizuela notified of admission

## 2024-06-13 NOTE — GOALS OF CARE CONVERSATION - ADVANCED CARE PLANNING - CONVERSATION DETAILS
Do you have Advance Directives (HCP / LV / Organ donation / Documentation of oral advance Directive):   (  x  )  yes    (      )    NO                                                                            Do you have LV - Living will :                                                                                                                           ( x   )  yes    (      )   No    Do you have HCP - Health Care Proxy:                                                                                                         (    )  yes   (       ) N0    Do you have DNR- Do Not Resuscitate :                                                                                                       (      )  yes  (       )  No    Do you have DNI- Do Not intubate  :                                                                                                              (      )  yes   (      ) No    Do you have MOLST - Medical orders for Life sustaining treatment  :                                                        (      ) yes    (      ) No    Decision Maker :  (    ) Patient     (      )  HCA   (     ) Public Health Law Surrogate     (      ) Surrogate  (       ) Guardian    Goals of Care :  (     )   Complete Care     (       ) No Limitations                              (       )   Comfort Care       (       )  Hospice                               (      )   Limited medical Intervention / s    Medical Interventions :   (    x   )   CPR       (        )  DNR                                               (    x    )  Intubation with MV - Mechanical Ventilation  (  x    ) BIPAP/CPAP    (         )   DNI                                               (    x     )  Artificial Nutrition -  IVF, TPN / PPN, Tube Feeds             (         )   No Feeding Tube                                                (     x   ) Use Antibiotics                         (          ) No Antibiotics                                                (      x   ) Blood and Blood Products     (         )   No Blood or Blood products                                                (     x     )  Dialysis                                    (         )  No Dialysis                                                (          )  Medical Management only  (         )  No Invasive Interventions or Surgery  Time spent :                        (       ) upto 30 minutes                       (    x       )   more than 30 minutes  ACP and GOC reviewed and discussed
Writer met with pt in room. Reviewed patient's medical and social history as well as events leading to patient's hospitalization. Writer discussed patient's current diagnosis (ankle wound,DM, HTN, A/F, PE,BPH), medical condition and management, prognosis, and life expectancy. Inquired about patient's wishes regarding extent of medical care to be provided including escalation of medical care into the ICU and use of vasopressor support. In addition, the writer inquired about thoughts regarding cardiopulmonary resuscitation, artificial nutrition and hydration including use of feeding tubes and IVF, antibiotics, and further investigative studies such as blood draws and radiology. Pt doesn't trust anyone to make these decisions for him, discussed ways he could document what he wants. Pt not interested at this time will speak to social work when he returns to [facility]. Psychosocial support provided.

## 2024-06-13 NOTE — H&P ADULT - NSICDXFAMILYHX_GEN_ALL_CORE_FT
FAMILY HISTORY:  FH: coronary artery disease, Father  FH: pulmonary embolism, Mother  FH: stroke, Father    
FAMILY HISTORY:  FH: coronary artery disease, Father  FH: pulmonary embolism, Mother  FH: stroke, Father

## 2024-06-13 NOTE — PROGRESS NOTE ADULT - NS ATTEND AMEND GEN_ALL_CORE FT
53M w/ PMHx of Afib, CAD s/p stents, CVA, DM, HTN, pulmonary embolism on Eliquis,  osteomyelitis s/p debridement presenting  presents to emergency department from [facility] for R heel ulcer debridement.    - Known  Afib, RVR now rate controlled  - Continue Cardizem, 90 mg PO Q6H  - Eliquis on hold, resume as soon as safe to do so from a surgical perspective  - Continue Metoprolol 25mg Q6H for now  - A fib RVR, s/p dig load with 0.5 mg IV x 1 followed by 0.25 mg IV Q6h x 2 doses. Rates now controlled  - Had pauses last admission, so would use caution with rate controlling agents  - volume status/edema notably better. cont torsemide  - Continue statin, ASA on hold, unclear why, resume when able.   - plan for or 6/4-optimized from cv persp.
53M w/ PMHx of Afib, CAD s/p stents, CVA, DM, HTN, pulmonary embolism on Eliquis,  osteomyelitis s/p debridement presenting  presents to emergency department from [facility] for R heel ulcer debridement.    - Known  Afib, RVR now rate controlled  - Continue Cardizem, 90 mg PO Q6H  - Eliquis on hold, resume as soon as safe to do so from a surgical perspective  - Continue Metoprolol 25mg Q6H for now  - A fib RVR, s/p dig load with 0.5 mg IV x 1 followed by 0.25 mg IV Q6h x 2 doses. Rates now controlled  - no pauses, can dc tele  - Check dig level   - Had pauses last admission, so would use caution with rate controlling agents  - cont torsemide  - Continue statin, ASA on hold, unclear why, resume when able.   - plan for or 6/4-optimized from cv persp
53M w/ PMHx of Afib, CAD s/p stents, CVA, DM, HTN, pulmonary embolism on Eliquis,  osteomyelitis s/p debridement presenting  presents to emergency department from [facility] for R heel ulcer debridement.    - Known Afib, initially RVR but has been rate-controlled   - Continue Cardizem 360mg CD and Toprol 100 mg daily   - Continue Eliquis   - Continue torsemide PO q12H.  Tolerated by renal function  - Continue statin and ASA  - S/p surgical debridement of the right heel with bone biopsy, 6/4  - Tolerated procedure well, cardiac status optimal post operatively.
53M w/ PMHx of Afib, CAD s/p stents, CVA, DM, HTN, pulmonary embolism on Eliquis,  osteomyelitis s/p debridement presenting  presents to emergency department from [facility] for R heel ulcer debridement.    - Known  Afib, now RVR  - Continue Cardizem, changed to 90 mg PO Q6H  - Continue Eliquis   - Continue Metoprolol, change to 25 mg PO Q6H and up titrate for rate control.   - Given A fib RVR, would dig load with 0.5 mg IV x 1 followed by 0.25 mg IV Q6h x 2 doses   - Transfer to telemetry  - Check dig level in 2 days   - Had pauses last admission, so would use caution.   - Continue Lasix 40 mg IV BID  - Continue aspirin and statin   - Plan for  surgical debridement of the right heel with bone biopsy, possible partial calcanectomy  - Defer surgery until heart rate better controlled.  Hoping for tomorrow.
53M w/ PMHx of Afib, CAD s/p stents, CVA, DM, HTN, pulmonary embolism on Eliquis,  osteomyelitis s/p debridement presenting  presents to emergency department from [facility] for R heel ulcer debridement.    - S/p Dig load.  AF Rates have been controlled  - On Diltiazem 360mg daily  - On Metoprolol XL 100mg daily   - Hold on further doses of Digoxin.  - Had pauses last admission, so would use caution.  No pauses noted while was on tele.   - Eliquis and ASA resumed  - Continue torsemide  - Continue statin   - Would obtain labs for today, no labs since 6/4  - Tolerated procedure well, cardiac status optimal post operatively.
53M w/ PMHx of Afib, CAD s/p stents, CVA, DM, HTN, pulmonary embolism on Eliquis,  osteomyelitis s/p debridement presenting  presents to emergency department from [facility] for R heel ulcer debridement.    - S/p Dig load.  AF Rates have been controlled  - On Diltiazem 360mg daily  - On Metoprolol XL 100mg daily   - Hold on further doses of Digoxin.  - Had pauses last admission, so would use caution.  No pauses noted while on tele.   - Eliquis and ASA resumed  - Continue torsemide  - Continue statin   - Would obtain labs for today, no labs since 6/4  - Tolerated procedure well, cardiac status optimal post operatively.
53M w/ PMHx of Afib, CAD s/p stents, CVA, DM, HTN, pulmonary embolism on Eliquis,  osteomyelitis s/p debridement presenting  presents to emergency department from [facility] for R heel ulcer debridement.    - Known Afib, initially RVR but has been rate-controlled   - Continue Cardizem 360mg CD and Toprol 100 mg daily   - Continue Eliquis   - Continue torsemide PO q12H.  Tolerated by renal function  - Continue statin and ASA  - S/p surgical debridement of the right heel with bone biopsy, 6/4  - Tolerated procedure well, cardiac status optimal post operatively.
53M w/ PMHx of Afib, CAD s/p stents, CVA, DM, HTN, pulmonary embolism on Eliquis,  osteomyelitis s/p debridement presenting  presents to emergency department from [facility] for R heel ulcer debridement.    - Known  Afib, RVR now rate controlled  - Continue Cardizem, 90 mg PO Q6H  - Eliquis on hold, resume as soon as safe to do so from a surgical perspective  - Continue Metoprolol 25mg Q6H for now  - A fib RVR, s/p dig load with 0.5 mg IV x 1 followed by 0.25 mg IV Q6h x 2 doses. Rates now controlled  - Continue to monitor on tele  - Check dig level   - Had pauses last admission, so would use caution with rate controlling agents  - Stop IV lasix and transition to home Torsemide 20mg BID  - Continue statin, ASA on hold, unclear why, resume when able.   - Plan for surgical debridement of the right heel with bone biopsy, possible partial calcanectomy, optimized from a cardiac standpoint for this to be done today
53M w/ PMHx of Afib, CAD s/p stents, CVA, DM, HTN, pulmonary embolism on Eliquis,  osteomyelitis s/p debridement presenting  presents to emergency department from [facility] for R heel ulcer debridement.    - Known  Afib, now -130s   - Continue Cardizem 360 mg   - Continue Eliquis   - Can add Metoprolol 25 mg PO BID and up titrate for rate control. Had pauses last admission, so would use caution.   - Rate controlled per flow sheets 90-100s     - 3/18/24 Technically difficult ECHO w/ normal LV size and function EF 50-55%, mildly reduced RV systolic function, mod dilated LA and RA, mod to sev MR, mild to mod TR, PASP 36  - Pt w/ BLE edema extending to thighs. multifactorial in nature. likely from hypoalbuminemia.   - Continue Lasix 40 mg IV BID  - Please continue to maintain strict I/Os, monitor daily weights, Cr, and K.     - No evidence of any active ischemia   - Continue aspirin and statin       - Plan for  surgical debridement of the right heel with bone biopsy, possible partial calcanectomy. Optimized as best as possible for low risk podiatric procedure.s      - Monitor and replete lytes, keep K>4, Mg>2.  - Will continue to follow.    Danny Arce, MS FNP, AGACNP  Nurse Practitioner- Cardiology   Please call on TEAMS
53M w/ PMHx of Afib, CAD s/p stents, CVA, DM, HTN, pulmonary embolism on Eliquis,  osteomyelitis s/p debridement presenting  presents to emergency department from [facility] for R heel ulcer debridement.    - S/p Dig load.  AF Rates have been controlled  - On Diltiazem 360mg daily  - On Metoprolol XL 100mg daily   - Hold on further doses of Digoxin.  - Had pauses last admission, so would use caution.  No pauses noted while was on tele.   - Eliquis and ASA resumed  - Continue torsemide  - Continue statin   - Tolerated procedure well, cardiac status optimal post operatively.
53M w/ PMHx of Afib, CAD s/p stents, CVA, DM, HTN, pulmonary embolism on Eliquis,  osteomyelitis s/p debridement presenting  presents to emergency department from [facility] for R heel ulcer debridement.      - Known  Afib, initially RVR but has been rate-controlled   - Continue Cardizem 360mg CD and Toprol 100 mg daily   - Continue Eliquis   - Continue torsemide PO q12H.  Tolerated by renal function  - Continue statin and ASA  - S/p surgical debridement of the right heel with bone biopsy, 6/4  - Tolerated procedure well, cardiac status optimal post operatively
53M w/ PMHx of Afib, CAD s/p stents, CVA, DM, HTN, pulmonary embolism on Eliquis,  osteomyelitis s/p debridement presenting  presents to emergency department from [facility] for R heel ulcer debridement.    - S/p Dig load.  AF Rates have been controlled  - Continue Cardizem 90 mg PO Q6H.  Switch to long acting.  - Continue Metoprolol 25mg changed to every 6 hours.  Switch to long-acting    - Hold on further doses of Digoxin.  - Had pauses last admission, so would use caution.  No pauses noted while on tele.   - Resume Eliquis per surgery   - Continue torsemide  - Resume aspirin postop per surgery   - Continue statin   - Tolerated procedure well, cardiac status optimal post operatively
53M w/ PMHx of Afib, CAD s/p stents, CVA, DM, HTN, pulmonary embolism on Eliquis,  osteomyelitis s/p debridement presenting  presents to emergency department from [facility] for R heel ulcer debridement.    - S/p Dig load.  AF Rates have been controlled  - On Diltiazem 360mg daily  - On Metoprolol XL 100mg daily   - Hold on further doses of Digoxin.  - Had pauses last admission, so would use caution.  No pauses noted while was on tele.   - Eliquis and ASA resumed  - Continue torsemide  - Continue statin   - Tolerated procedure well, cardiac status optimal post operatively.
3M w/ PMHx of Afib, CAD s/p stents, CVA, DM, HTN, pulmonary embolism on Eliquis,  osteomyelitis s/p debridement presenting  presents to emergency department from [facility] for R heel ulcer debridement.    - Known  Afib, now RVR  - Continue Cardizem, 90 mg PO Q6H  - Eliquis on hold, resume as soon as safe to do so from a surgical perspective  - Continue Metoprolol 25mg Q6H for now  - A fib RVR, s/p dig load with 0.5 mg IV x 1 followed by 0.25 mg IV Q6h x 2 doses. Rates now in the 60s.   - Continue to monitor on tele  - Check dig level   - Had pauses last admission, so would use caution with rate controlling agents  - Continue Lasix 40 mg IV BID for today, likely PO in AM  - Continue statin, ASA on hold, unclear why, resume when able.   - Plan for surgical debridement of the right heel with bone biopsy, possible partial calcanectomy, optimized from a cardiac standpoint for this to be done today once K is repleted.

## 2024-06-13 NOTE — ED PROVIDER NOTE - CARE PLAN
Principal Discharge DX:	Acute respiratory failure with hypoxia  Secondary Diagnosis:	Bradycardic cardiac arrest   1

## 2024-06-13 NOTE — ED PROVIDER NOTE - SIGNIFICANT NEGATIVE FINDINGS
no headache, no chest pain, no SOB, no palpitations, no abdominal pain, no fever, no chills,  no n/v/d, no urinary symptoms, no GI  bleeding. no neuro changes.

## 2024-06-13 NOTE — ED PROVIDER NOTE - GASTROINTESTINAL, MLM
Abdomen soft, distended non -tender, no guarding. no rebound no CVAT,  NGT inserted and abdominal distention reduced

## 2024-06-13 NOTE — H&P ADULT - NSICDXPASTSURGICALHX_GEN_ALL_CORE_FT
PAST SURGICAL HISTORY:  H/O abdominal surgery     Perforated gastric ulcer     Traumatic amputation of left foot, initial encounter     
PAST SURGICAL HISTORY:  H/O abdominal surgery     Perforated gastric ulcer     Traumatic amputation of left foot, initial encounter

## 2024-06-13 NOTE — H&P ADULT - NSICDXPASTMEDICALHX_GEN_ALL_CORE_FT
PAST MEDICAL HISTORY:  Afib     BPH (benign prostatic hyperplasia)     CAD S/P percutaneous coronary angioplasty     Cerebrovascular accident     Diabetes     Diabetes mellitus with no complication     History of non-ST elevation myocardial infarction (NSTEMI)     Hypertension     Osteomyelitis s/p debridement    Perforated gastric ulcer s/p emergent ex-lap omentopexy and plication 6/2019    Pulmonary embolism     
PAST MEDICAL HISTORY:  Afib     BPH (benign prostatic hyperplasia)     CAD S/P percutaneous coronary angioplasty     Cerebrovascular accident     Diabetes     Diabetes mellitus with no complication     History of non-ST elevation myocardial infarction (NSTEMI)     Hypertension     Osteomyelitis s/p debridement    Perforated gastric ulcer s/p emergent ex-lap omentopexy and plication 6/2019    Pulmonary embolism

## 2024-06-13 NOTE — PHARMACOTHERAPY INTERVENTION NOTE - NSPHARMCOMMASP
ASP - Dose optimization/Non-Renal dose adjustment

## 2024-06-14 ENCOUNTER — RESULT REVIEW (OUTPATIENT)
Age: 54
End: 2024-06-14

## 2024-06-14 DIAGNOSIS — I48.20 CHRONIC ATRIAL FIBRILLATION, UNSPECIFIED: ICD-10-CM

## 2024-06-14 DIAGNOSIS — I10 ESSENTIAL (PRIMARY) HYPERTENSION: ICD-10-CM

## 2024-06-14 DIAGNOSIS — J96.01 ACUTE RESPIRATORY FAILURE WITH HYPOXIA: ICD-10-CM

## 2024-06-14 DIAGNOSIS — E11.9 TYPE 2 DIABETES MELLITUS WITHOUT COMPLICATIONS: ICD-10-CM

## 2024-06-14 DIAGNOSIS — I46.9 CARDIAC ARREST, CAUSE UNSPECIFIED: ICD-10-CM

## 2024-06-14 LAB
ALBUMIN SERPL ELPH-MCNC: 1.8 G/DL — LOW (ref 3.3–5)
ALBUMIN SERPL ELPH-MCNC: 2.7 G/DL — LOW (ref 3.3–5)
ALP SERPL-CCNC: 152 U/L — HIGH (ref 40–120)
ALP SERPL-CCNC: 99 U/L — SIGNIFICANT CHANGE UP (ref 40–120)
ALT FLD-CCNC: 203 U/L — HIGH (ref 12–78)
ALT FLD-CCNC: 435 U/L — HIGH (ref 12–78)
ANION GAP SERPL CALC-SCNC: 14 MMOL/L — SIGNIFICANT CHANGE UP (ref 5–17)
ANION GAP SERPL CALC-SCNC: 16 MMOL/L — SIGNIFICANT CHANGE UP (ref 5–17)
ANION GAP SERPL CALC-SCNC: 22 MMOL/L — HIGH (ref 5–17)
ANION GAP SERPL CALC-SCNC: 23 MMOL/L — HIGH (ref 5–17)
APPEARANCE UR: ABNORMAL
APTT BLD: 42.6 SEC — HIGH (ref 24.5–35.6)
AST SERPL-CCNC: 207 U/L — HIGH (ref 15–37)
AST SERPL-CCNC: 385 U/L — HIGH (ref 15–37)
BACTERIA # UR AUTO: ABNORMAL /HPF
BASE EXCESS BLDA CALC-SCNC: -13.2 MMOL/L — LOW (ref -2–3)
BASE EXCESS BLDA CALC-SCNC: -14.9 MMOL/L — LOW (ref -2–3)
BASE EXCESS BLDA CALC-SCNC: -20.8 MMOL/L — LOW (ref -2–3)
BASOPHILS # BLD AUTO: 0 K/UL — SIGNIFICANT CHANGE UP (ref 0–0.2)
BASOPHILS NFR BLD AUTO: 0 % — SIGNIFICANT CHANGE UP (ref 0–2)
BILIRUB SERPL-MCNC: 0.6 MG/DL — SIGNIFICANT CHANGE UP (ref 0.2–1.2)
BILIRUB SERPL-MCNC: 0.7 MG/DL — SIGNIFICANT CHANGE UP (ref 0.2–1.2)
BILIRUB UR-MCNC: NEGATIVE — SIGNIFICANT CHANGE UP
BLOOD GAS COMMENTS ARTERIAL: SIGNIFICANT CHANGE UP
BUN SERPL-MCNC: 32 MG/DL — HIGH (ref 7–23)
BUN SERPL-MCNC: 47 MG/DL — HIGH (ref 7–23)
BUN SERPL-MCNC: 50 MG/DL — HIGH (ref 7–23)
BUN SERPL-MCNC: 51 MG/DL — HIGH (ref 7–23)
CALCIUM SERPL-MCNC: 6.1 MG/DL — CRITICAL LOW (ref 8.5–10.1)
CALCIUM SERPL-MCNC: 8 MG/DL — LOW (ref 8.5–10.1)
CALCIUM SERPL-MCNC: 8.1 MG/DL — LOW (ref 8.5–10.1)
CALCIUM SERPL-MCNC: 8.4 MG/DL — LOW (ref 8.5–10.1)
CHLORIDE SERPL-SCNC: 103 MMOL/L — SIGNIFICANT CHANGE UP (ref 96–108)
CHLORIDE SERPL-SCNC: 103 MMOL/L — SIGNIFICANT CHANGE UP (ref 96–108)
CHLORIDE SERPL-SCNC: 106 MMOL/L — SIGNIFICANT CHANGE UP (ref 96–108)
CHLORIDE SERPL-SCNC: 116 MMOL/L — HIGH (ref 96–108)
CK MB CFR SERPL CALC: <1 NG/ML — SIGNIFICANT CHANGE UP (ref 0–3.6)
CK SERPL-CCNC: 35 U/L — SIGNIFICANT CHANGE UP (ref 26–308)
CO2 SERPL-SCNC: 11 MMOL/L — LOW (ref 22–31)
CO2 SERPL-SCNC: 14 MMOL/L — LOW (ref 22–31)
CO2 SERPL-SCNC: 14 MMOL/L — LOW (ref 22–31)
CO2 SERPL-SCNC: 19 MMOL/L — LOW (ref 22–31)
COLOR SPEC: YELLOW — SIGNIFICANT CHANGE UP
COMMENT - URINE: SIGNIFICANT CHANGE UP
CREAT SERPL-MCNC: 1.7 MG/DL — HIGH (ref 0.5–1.3)
CREAT SERPL-MCNC: 3 MG/DL — HIGH (ref 0.5–1.3)
CREAT SERPL-MCNC: 3.1 MG/DL — HIGH (ref 0.5–1.3)
CREAT SERPL-MCNC: 3.5 MG/DL — HIGH (ref 0.5–1.3)
CRP SERPL-MCNC: 6 MG/L — HIGH
DIFF PNL FLD: ABNORMAL
EGFR: 20 ML/MIN/1.73M2 — LOW
EGFR: 23 ML/MIN/1.73M2 — LOW
EGFR: 24 ML/MIN/1.73M2 — LOW
EGFR: 47 ML/MIN/1.73M2 — LOW
EOSINOPHIL # BLD AUTO: 0.24 K/UL — SIGNIFICANT CHANGE UP (ref 0–0.5)
EOSINOPHIL NFR BLD AUTO: 1 % — SIGNIFICANT CHANGE UP (ref 0–6)
EPI CELLS # UR: PRESENT
ERYTHROCYTE [SEDIMENTATION RATE] IN BLOOD: 4 MM/HR — SIGNIFICANT CHANGE UP (ref 0–20)
GAS PNL BLDA: SIGNIFICANT CHANGE UP
GLUCOSE BLDC GLUCOMTR-MCNC: 153 MG/DL — HIGH (ref 70–99)
GLUCOSE BLDC GLUCOMTR-MCNC: 163 MG/DL — HIGH (ref 70–99)
GLUCOSE BLDC GLUCOMTR-MCNC: 303 MG/DL — HIGH (ref 70–99)
GLUCOSE BLDC GLUCOMTR-MCNC: 335 MG/DL — HIGH (ref 70–99)
GLUCOSE BLDC GLUCOMTR-MCNC: 363 MG/DL — HIGH (ref 70–99)
GLUCOSE BLDC GLUCOMTR-MCNC: 396 MG/DL — HIGH (ref 70–99)
GLUCOSE BLDC GLUCOMTR-MCNC: 415 MG/DL — HIGH (ref 70–99)
GLUCOSE BLDC GLUCOMTR-MCNC: 415 MG/DL — HIGH (ref 70–99)
GLUCOSE BLDC GLUCOMTR-MCNC: 430 MG/DL — HIGH (ref 70–99)
GLUCOSE BLDC GLUCOMTR-MCNC: 440 MG/DL — HIGH (ref 70–99)
GLUCOSE BLDC GLUCOMTR-MCNC: 79 MG/DL — SIGNIFICANT CHANGE UP (ref 70–99)
GLUCOSE BLDC GLUCOMTR-MCNC: 79 MG/DL — SIGNIFICANT CHANGE UP (ref 70–99)
GLUCOSE SERPL-MCNC: 355 MG/DL — HIGH (ref 70–99)
GLUCOSE SERPL-MCNC: 371 MG/DL — HIGH (ref 70–99)
GLUCOSE SERPL-MCNC: 457 MG/DL — CRITICAL HIGH (ref 70–99)
GLUCOSE SERPL-MCNC: 469 MG/DL — CRITICAL HIGH (ref 70–99)
GLUCOSE UR QL: NEGATIVE MG/DL — SIGNIFICANT CHANGE UP
HCO3 BLDA-SCNC: 10 MMOL/L — CRITICAL LOW (ref 21–28)
HCO3 BLDA-SCNC: 12 MMOL/L — LOW (ref 21–28)
HCO3 BLDA-SCNC: 14 MMOL/L — LOW (ref 21–28)
HCT VFR BLD CALC: 45 % — SIGNIFICANT CHANGE UP (ref 39–50)
HGB BLD-MCNC: 13.7 G/DL — SIGNIFICANT CHANGE UP (ref 13–17)
HOROWITZ INDEX BLDA+IHG-RTO: 100 — SIGNIFICANT CHANGE UP
HOROWITZ INDEX BLDA+IHG-RTO: 30 — SIGNIFICANT CHANGE UP
HOROWITZ INDEX BLDA+IHG-RTO: 50 — SIGNIFICANT CHANGE UP
INR BLD: 2.33 RATIO — HIGH (ref 0.85–1.18)
KETONES UR-MCNC: NEGATIVE MG/DL — SIGNIFICANT CHANGE UP
LACTATE SERPL-SCNC: 12.8 MMOL/L — CRITICAL HIGH (ref 0.7–2)
LACTATE SERPL-SCNC: 12.9 MMOL/L — CRITICAL HIGH (ref 0.7–2)
LACTATE SERPL-SCNC: 15 MMOL/L — CRITICAL HIGH (ref 0.7–2)
LACTATE SERPL-SCNC: 7.2 MMOL/L — CRITICAL HIGH (ref 0.7–2)
LEUKOCYTE ESTERASE UR-ACNC: ABNORMAL
LIDOCAIN IGE QN: 13 U/L — SIGNIFICANT CHANGE UP (ref 13–75)
LYMPHOCYTES # BLD AUTO: 19 % — SIGNIFICANT CHANGE UP (ref 13–44)
LYMPHOCYTES # BLD AUTO: 4.47 K/UL — HIGH (ref 1–3.3)
MAGNESIUM SERPL-MCNC: 1.4 MG/DL — LOW (ref 1.6–2.6)
MAGNESIUM SERPL-MCNC: 1.9 MG/DL — SIGNIFICANT CHANGE UP (ref 1.6–2.6)
MAGNESIUM SERPL-MCNC: 2.2 MG/DL — SIGNIFICANT CHANGE UP (ref 1.6–2.6)
MANUAL SMEAR VERIFICATION: YES — SIGNIFICANT CHANGE UP
MCHC RBC-ENTMCNC: 30.4 GM/DL — LOW (ref 32–36)
MCHC RBC-ENTMCNC: 30.7 PG — SIGNIFICANT CHANGE UP (ref 27–34)
MCV RBC AUTO: 100.9 FL — HIGH (ref 80–100)
MONOCYTES # BLD AUTO: 1.65 K/UL — HIGH (ref 0–0.9)
MONOCYTES NFR BLD AUTO: 7 % — SIGNIFICANT CHANGE UP (ref 2–14)
MRSA PCR RESULT.: DETECTED
NEUTROPHILS # BLD AUTO: 17.17 K/UL — HIGH (ref 1.8–7.4)
NEUTROPHILS NFR BLD AUTO: 68 % — SIGNIFICANT CHANGE UP (ref 43–77)
NEUTS BAND # BLD: 5 % — SIGNIFICANT CHANGE UP (ref 0–8)
NITRITE UR-MCNC: NEGATIVE — SIGNIFICANT CHANGE UP
NRBC # BLD: 0 /100 WBCS — SIGNIFICANT CHANGE UP (ref 0–0)
NRBC # BLD: 0 /100 WBCS — SIGNIFICANT CHANGE UP (ref 0–0)
NRBC # BLD: SIGNIFICANT CHANGE UP /100 WBCS (ref 0–0)
NT-PROBNP SERPL-SCNC: 6578 PG/ML — HIGH (ref 0–125)
PCO2 BLDA: 28 MMHG — LOW (ref 35–48)
PCO2 BLDA: 31 MMHG — LOW (ref 35–48)
PCO2 BLDA: 35 MMHG — SIGNIFICANT CHANGE UP (ref 35–48)
PH BLDA: 7.05 — CRITICAL LOW (ref 7.35–7.45)
PH BLDA: 7.25 — LOW (ref 7.35–7.45)
PH BLDA: 7.26 — LOW (ref 7.35–7.45)
PH UR: 5.5 — SIGNIFICANT CHANGE UP (ref 5–8)
PHOSPHATE SERPL-MCNC: 0.8 MG/DL — CRITICAL LOW (ref 2.5–4.5)
PHOSPHATE SERPL-MCNC: 2.8 MG/DL — SIGNIFICANT CHANGE UP (ref 2.5–4.5)
PLAT MORPH BLD: NORMAL — SIGNIFICANT CHANGE UP
PLATELET # BLD AUTO: 446 K/UL — HIGH (ref 150–400)
PO2 BLDA: 143 MMHG — HIGH (ref 83–108)
PO2 BLDA: 164 MMHG — HIGH (ref 83–108)
PO2 BLDA: 88 MMHG — SIGNIFICANT CHANGE UP (ref 83–108)
POTASSIUM SERPL-MCNC: 3 MMOL/L — LOW (ref 3.5–5.3)
POTASSIUM SERPL-MCNC: 3.8 MMOL/L — SIGNIFICANT CHANGE UP (ref 3.5–5.3)
POTASSIUM SERPL-MCNC: 3.8 MMOL/L — SIGNIFICANT CHANGE UP (ref 3.5–5.3)
POTASSIUM SERPL-MCNC: 4.3 MMOL/L — SIGNIFICANT CHANGE UP (ref 3.5–5.3)
POTASSIUM SERPL-SCNC: 3 MMOL/L — LOW (ref 3.5–5.3)
POTASSIUM SERPL-SCNC: 3.8 MMOL/L — SIGNIFICANT CHANGE UP (ref 3.5–5.3)
POTASSIUM SERPL-SCNC: 3.8 MMOL/L — SIGNIFICANT CHANGE UP (ref 3.5–5.3)
POTASSIUM SERPL-SCNC: 4.3 MMOL/L — SIGNIFICANT CHANGE UP (ref 3.5–5.3)
PROT SERPL-MCNC: 5 G/DL — LOW (ref 6–8.3)
PROT SERPL-MCNC: 6.8 G/DL — SIGNIFICANT CHANGE UP (ref 6–8.3)
PROT UR-MCNC: 100 MG/DL
PROTHROM AB SERPL-ACNC: 26.6 SEC — HIGH (ref 9.5–13)
RAPID RVP RESULT: SIGNIFICANT CHANGE UP
RBC # BLD: 4.46 M/UL — SIGNIFICANT CHANGE UP (ref 4.2–5.8)
RBC # FLD: 14.2 % — SIGNIFICANT CHANGE UP (ref 10.3–14.5)
RBC BLD AUTO: SIGNIFICANT CHANGE UP
RBC CASTS # UR COMP ASSIST: 28 /HPF — HIGH (ref 0–4)
S AUREUS DNA NOSE QL NAA+PROBE: DETECTED
SAO2 % BLDA: 100 % — HIGH (ref 94–98)
SAO2 % BLDA: 98 % — SIGNIFICANT CHANGE UP (ref 94–98)
SAO2 % BLDA: 99.6 % — HIGH (ref 94–98)
SARS-COV-2 RNA SPEC QL NAA+PROBE: SIGNIFICANT CHANGE UP
SODIUM SERPL-SCNC: 139 MMOL/L — SIGNIFICANT CHANGE UP (ref 135–145)
SODIUM SERPL-SCNC: 139 MMOL/L — SIGNIFICANT CHANGE UP (ref 135–145)
SODIUM SERPL-SCNC: 140 MMOL/L — SIGNIFICANT CHANGE UP (ref 135–145)
SODIUM SERPL-SCNC: 143 MMOL/L — SIGNIFICANT CHANGE UP (ref 135–145)
SP GR SPEC: 1.01 — SIGNIFICANT CHANGE UP (ref 1–1.03)
T3 SERPL-MCNC: 120 NG/DL — SIGNIFICANT CHANGE UP (ref 80–200)
T4 AB SER-ACNC: 9.5 UG/DL — SIGNIFICANT CHANGE UP (ref 4.6–12)
TROPONIN I, HIGH SENSITIVITY RESULT: 110 NG/L — HIGH
TROPONIN I, HIGH SENSITIVITY RESULT: 114.3 NG/L — HIGH
TROPONIN I, HIGH SENSITIVITY RESULT: 32.7 NG/L — SIGNIFICANT CHANGE UP
TSH SERPL-MCNC: 7.84 UIU/ML — HIGH (ref 0.36–3.74)
UROBILINOGEN FLD QL: 0.2 MG/DL — SIGNIFICANT CHANGE UP (ref 0.2–1)
VANCOMYCIN FLD-MCNC: 15.2 UG/ML — SIGNIFICANT CHANGE UP (ref 10–25)
VANCOMYCIN FLD-MCNC: 16.1 UG/ML — SIGNIFICANT CHANGE UP (ref 10–25)
WBC # BLD: 34.23 K/UL — HIGH (ref 3.8–10.5)
WBC # FLD AUTO: 34.23 K/UL — HIGH (ref 3.8–10.5)
WBC UR QL: ABNORMAL /HPF (ref 0–5)

## 2024-06-14 PROCEDURE — 99291 CRITICAL CARE FIRST HOUR: CPT

## 2024-06-14 PROCEDURE — 93010 ELECTROCARDIOGRAM REPORT: CPT

## 2024-06-14 PROCEDURE — 74176 CT ABD & PELVIS W/O CONTRAST: CPT | Mod: 26

## 2024-06-14 PROCEDURE — 70450 CT HEAD/BRAIN W/O DYE: CPT | Mod: 26

## 2024-06-14 PROCEDURE — 99291 CRITICAL CARE FIRST HOUR: CPT | Mod: GC

## 2024-06-14 PROCEDURE — 93306 TTE W/DOPPLER COMPLETE: CPT | Mod: 26

## 2024-06-14 PROCEDURE — 71250 CT THORAX DX C-: CPT | Mod: 26

## 2024-06-14 PROCEDURE — 71045 X-RAY EXAM CHEST 1 VIEW: CPT | Mod: 26,76

## 2024-06-14 RX ORDER — POLYETHYLENE GLYCOL 3350 1 G/G
17 POWDER ORAL
Refills: 0 | DISCHARGE

## 2024-06-14 RX ORDER — POTASSIUM CHLORIDE 600 MG/1
10 TABLET, FILM COATED, EXTENDED RELEASE ORAL
Refills: 0 | Status: COMPLETED | OUTPATIENT
Start: 2024-06-14 | End: 2024-06-14

## 2024-06-14 RX ORDER — ASPIRIN 325 MG/1
1 TABLET, FILM COATED ORAL
Refills: 0 | DISCHARGE

## 2024-06-14 RX ORDER — SENNOSIDES 8.6 MG
2 TABLET ORAL
Refills: 0 | DISCHARGE

## 2024-06-14 RX ORDER — TORSEMIDE 20 MG/1
1 TABLET ORAL
Refills: 0 | DISCHARGE

## 2024-06-14 RX ORDER — TIOTROPIUM BROMIDE 18 UG/1
2 CAPSULE ORAL; RESPIRATORY (INHALATION)
Refills: 0 | DISCHARGE

## 2024-06-14 RX ORDER — SODIUM CHLORIDE 0.9 % (FLUSH) 0.9 %
10 SYRINGE (ML) INJECTION
Refills: 0 | Status: DISCONTINUED | OUTPATIENT
Start: 2024-06-14 | End: 2024-07-05

## 2024-06-14 RX ORDER — DILTIAZEM HYDROCHLORIDE 240 MG/1
1 CAPSULE, EXTENDED RELEASE ORAL
Refills: 0 | DISCHARGE

## 2024-06-14 RX ORDER — SUCRALFATE 1 G
1 TABLET ORAL
Refills: 0 | DISCHARGE

## 2024-06-14 RX ORDER — INSULIN LISPRO 100 [IU]/ML
INJECTION, SOLUTION SUBCUTANEOUS EVERY 6 HOURS
Refills: 0 | Status: DISCONTINUED | OUTPATIENT
Start: 2024-06-14 | End: 2024-06-14

## 2024-06-14 RX ORDER — METOPROLOL TARTRATE 50 MG
1 TABLET ORAL
Refills: 0 | DISCHARGE

## 2024-06-14 RX ORDER — LEVOCETIRIZINE DIHYDROCHLORIDE 5 MG/1
1 TABLET ORAL
Refills: 0 | DISCHARGE

## 2024-06-14 RX ORDER — DOCUSATE SODIUM 100 MG/1
3 CAPSULE, LIQUID FILLED ORAL
Refills: 0 | DISCHARGE

## 2024-06-14 RX ORDER — DEXTROSE 30 % IN WATER 30 %
25 VIAL (ML) INTRAVENOUS ONCE
Refills: 0 | Status: DISCONTINUED | OUTPATIENT
Start: 2024-06-14 | End: 2024-07-05

## 2024-06-14 RX ORDER — NOREPINEPHRINE BITARTRATE 1 MG/ML
0.35 INJECTION INTRAVENOUS
Qty: 16 | Refills: 0 | Status: DISCONTINUED | OUTPATIENT
Start: 2024-06-14 | End: 2024-06-15

## 2024-06-14 RX ORDER — APIXABAN 2.5 MG/1
1 TABLET, FILM COATED ORAL
Refills: 0 | DISCHARGE

## 2024-06-14 RX ORDER — OXYCODONE HYDROCHLORIDE 5 MG/1
1 TABLET ORAL
Refills: 0 | DISCHARGE

## 2024-06-14 RX ORDER — AMIODARONE HYDROCHLORIDE 50 MG/ML
1 INJECTION, SOLUTION INTRAVENOUS
Qty: 450 | Refills: 0 | Status: DISCONTINUED | OUTPATIENT
Start: 2024-06-14 | End: 2024-06-16

## 2024-06-14 RX ORDER — GLUCAGON HYDROCHLORIDE 1 MG/ML
1 INJECTION, POWDER, FOR SOLUTION INTRAMUSCULAR; INTRAVENOUS; SUBCUTANEOUS ONCE
Refills: 0 | Status: DISCONTINUED | OUTPATIENT
Start: 2024-06-14 | End: 2024-06-15

## 2024-06-14 RX ORDER — OXYBUTYNIN CHLORIDE 5 MG
1 TABLET ORAL
Refills: 0 | DISCHARGE

## 2024-06-14 RX ORDER — NALOXEGOL OXALATE 25 MG/1
1 TABLET, FILM COATED ORAL
Refills: 0 | DISCHARGE

## 2024-06-14 RX ORDER — GABAPENTIN 400 MG/1
1 CAPSULE ORAL
Refills: 0 | DISCHARGE

## 2024-06-14 RX ORDER — AMIODARONE HYDROCHLORIDE 50 MG/ML
150 INJECTION, SOLUTION INTRAVENOUS ONCE
Refills: 0 | Status: COMPLETED | OUTPATIENT
Start: 2024-06-14 | End: 2024-06-14

## 2024-06-14 RX ORDER — SILVER SULFADIAZINE 10 MG/G
1 CREAM TOPICAL
Refills: 0 | DISCHARGE

## 2024-06-14 RX ORDER — INSULIN GLARGINE 100 [IU]/ML
7 INJECTION, SOLUTION SUBCUTANEOUS
Refills: 0 | DISCHARGE

## 2024-06-14 RX ORDER — INSULIN GLARGINE 100 [IU]/ML
15 INJECTION, SOLUTION SUBCUTANEOUS
Refills: 0 | DISCHARGE

## 2024-06-14 RX ORDER — METFORMIN HYDROCHLORIDE 850 MG/1
1 TABLET, FILM COATED ORAL
Refills: 0 | DISCHARGE

## 2024-06-14 RX ORDER — ACETAMINOPHEN 325 MG
1000 TABLET ORAL ONCE
Refills: 0 | Status: COMPLETED | OUTPATIENT
Start: 2024-06-14 | End: 2024-06-14

## 2024-06-14 RX ORDER — INSULIN LISPRO 100 [IU]/ML
10 INJECTION, SOLUTION SUBCUTANEOUS ONCE
Refills: 0 | Status: COMPLETED | OUTPATIENT
Start: 2024-06-14 | End: 2024-06-14

## 2024-06-14 RX ORDER — NALOXEGOL OXALATE 12.5 MG/1
1 TABLET, FILM COATED ORAL
Refills: 0 | DISCHARGE

## 2024-06-14 RX ORDER — DEXTROSE 30 % IN WATER 30 %
12.5 VIAL (ML) INTRAVENOUS ONCE
Refills: 0 | Status: DISCONTINUED | OUTPATIENT
Start: 2024-06-14 | End: 2024-07-05

## 2024-06-14 RX ORDER — VANCOMYCIN HYDROCHLORIDE 50 MG/ML
1000 KIT ORAL ONCE
Refills: 0 | Status: COMPLETED | OUTPATIENT
Start: 2024-06-14 | End: 2024-06-14

## 2024-06-14 RX ORDER — DEXTROSE 30 % IN WATER 30 %
15 VIAL (ML) INTRAVENOUS ONCE
Refills: 0 | Status: DISCONTINUED | OUTPATIENT
Start: 2024-06-14 | End: 2024-06-18

## 2024-06-14 RX ORDER — ASCORBIC ACID 60 MG
1 TABLET,CHEWABLE ORAL
Refills: 0 | DISCHARGE

## 2024-06-14 RX ORDER — CALCIUM GLUCONATE 98 MG/ML
6 INJECTION, SOLUTION INTRAVENOUS ONCE
Refills: 0 | Status: DISCONTINUED | OUTPATIENT
Start: 2024-06-14 | End: 2024-06-14

## 2024-06-14 RX ORDER — TIOTROPIUM BROMIDE AND OLODATEROL 3.124; 2.736 UG/1; UG/1
2 SPRAY, METERED RESPIRATORY (INHALATION)
Refills: 0 | DISCHARGE

## 2024-06-14 RX ORDER — FERROUS SULFATE 325(65) MG
1 TABLET ORAL
Refills: 0 | DISCHARGE

## 2024-06-14 RX ORDER — CHOLECALCIFEROL (VITAMIN D3) 125 MCG
1 CAPSULE ORAL
Refills: 0 | DISCHARGE

## 2024-06-14 RX ORDER — GABAPENTIN
1 POWDER (GRAM) MISCELLANEOUS
Refills: 0 | DISCHARGE

## 2024-06-14 RX ORDER — ATORVASTATIN CALCIUM 20 MG/1
1 TABLET, FILM COATED ORAL
Refills: 0 | DISCHARGE

## 2024-06-14 RX ORDER — MULTIVIT-MIN/FERROUS GLUCONATE 9 MG/15 ML
1 LIQUID (ML) ORAL
Refills: 0 | DISCHARGE

## 2024-06-14 RX ORDER — DEXTROSE MONOHYDRATE AND SODIUM CHLORIDE 5; .3 G/100ML; G/100ML
1000 INJECTION, SOLUTION INTRAVENOUS
Refills: 0 | Status: DISCONTINUED | OUTPATIENT
Start: 2024-06-14 | End: 2024-06-14

## 2024-06-14 RX ORDER — EPINEPHRINE 0.3 MG/.3ML
0.05 INJECTION SUBCUTANEOUS
Qty: 4 | Refills: 0 | Status: DISCONTINUED | OUTPATIENT
Start: 2024-06-14 | End: 2024-06-14

## 2024-06-14 RX ORDER — FAMOTIDINE 40 MG
1 TABLET ORAL
Refills: 0 | DISCHARGE

## 2024-06-14 RX ORDER — NALOXEGOL OXALATE 25 MG/1
25 TABLET, FILM COATED ORAL DAILY
Refills: 0 | Status: DISCONTINUED | OUTPATIENT
Start: 2024-06-14 | End: 2024-06-15

## 2024-06-14 RX ORDER — FEXOFENADINE HCL 30 MG
1 TABLET ORAL
Refills: 0 | DISCHARGE

## 2024-06-14 RX ORDER — ERTAPENEM SODIUM 1 G/1
1 INJECTION, POWDER, LYOPHILIZED, FOR SOLUTION INTRAMUSCULAR; INTRAVENOUS
Refills: 0 | DISCHARGE

## 2024-06-14 RX ORDER — CRANBERRY FRUIT 450 MG
450 TABLET ORAL
Refills: 0 | DISCHARGE

## 2024-06-14 RX ORDER — DIGOXIN 125 MCG
250 TABLET ORAL ONCE
Refills: 0 | Status: COMPLETED | OUTPATIENT
Start: 2024-06-14 | End: 2024-06-14

## 2024-06-14 RX ORDER — DEXTROSE MONOHYDRATE AND SODIUM CHLORIDE 5; .3 G/100ML; G/100ML
1000 INJECTION, SOLUTION INTRAVENOUS
Refills: 0 | Status: DISCONTINUED | OUTPATIENT
Start: 2024-06-14 | End: 2024-07-05

## 2024-06-14 RX ORDER — SENNA PLUS 8.6 MG/1
2 TABLET ORAL
Refills: 0 | DISCHARGE

## 2024-06-14 RX ORDER — INSULIN HUMAN 100 [IU]/ML
0 INJECTION, SOLUTION SUBCUTANEOUS
Refills: 0 | DISCHARGE

## 2024-06-14 RX ORDER — AMIODARONE HYDROCHLORIDE 50 MG/ML
0.5 INJECTION, SOLUTION INTRAVENOUS
Qty: 450 | Refills: 0 | Status: DISCONTINUED | OUTPATIENT
Start: 2024-06-14 | End: 2024-06-16

## 2024-06-14 RX ORDER — PANTOPRAZOLE SODIUM 40 MG/10ML
1 INJECTION, POWDER, FOR SOLUTION INTRAVENOUS
Refills: 0 | DISCHARGE

## 2024-06-14 RX ORDER — OXYCODONE HYDROCHLORIDE 100 MG/5ML
1 SOLUTION ORAL
Refills: 0 | DISCHARGE

## 2024-06-14 RX ORDER — METOCLOPRAMIDE HCL 10 MG
1 TABLET ORAL
Refills: 0 | DISCHARGE

## 2024-06-14 RX ORDER — PANTOPRAZOLE SODIUM 20 MG/1
1 TABLET, DELAYED RELEASE ORAL
Refills: 0 | DISCHARGE

## 2024-06-14 RX ORDER — FEXOFENADINE HCL 180 MG
1 TABLET ORAL
Refills: 0 | DISCHARGE

## 2024-06-14 RX ORDER — INSULIN REGULAR, HUMAN 100/ML
10 VIAL (ML) INJECTION
Qty: 100 | Refills: 0 | Status: DISCONTINUED | OUTPATIENT
Start: 2024-06-14 | End: 2024-06-15

## 2024-06-14 RX ORDER — VANCOMYCIN HYDROCHLORIDE 50 MG/ML
750 KIT ORAL
Refills: 0 | DISCHARGE

## 2024-06-14 RX ORDER — INSULIN LISPRO 100 [IU]/ML
0 INJECTION, SOLUTION SUBCUTANEOUS
Refills: 0 | DISCHARGE

## 2024-06-14 RX ORDER — POLYETHYLENE GLYCOL 3350 17 G/17G
17 POWDER, FOR SOLUTION ORAL
Refills: 0 | DISCHARGE

## 2024-06-14 RX ORDER — APIXABAN 5 MG/1
1 TABLET, FILM COATED ORAL
Refills: 0 | DISCHARGE

## 2024-06-14 RX ORDER — LEVOCETIRIZINE DIHYDROCHLORIDE 0.5 MG/ML
1 SOLUTION ORAL
Refills: 0 | DISCHARGE

## 2024-06-14 RX ORDER — SENNOSIDES 8.6 MG
2 TABLET ORAL AT BEDTIME
Refills: 0 | Status: DISCONTINUED | OUTPATIENT
Start: 2024-06-14 | End: 2024-06-15

## 2024-06-14 RX ORDER — MAGNESIUM SULFATE 100 %
4 POWDER (GRAM) MISCELLANEOUS ONCE
Refills: 0 | Status: DISCONTINUED | OUTPATIENT
Start: 2024-06-14 | End: 2024-06-14

## 2024-06-14 RX ORDER — POTASSIUM CHLORIDE 20 MEQ
1 PACKET (EA) ORAL
Refills: 0 | DISCHARGE

## 2024-06-14 RX ORDER — MAGNESIUM SULFATE 100 %
2 POWDER (GRAM) MISCELLANEOUS ONCE
Refills: 0 | Status: COMPLETED | OUTPATIENT
Start: 2024-06-14 | End: 2024-06-14

## 2024-06-14 RX ORDER — VANCOMYCIN HYDROCHLORIDE 50 MG/ML
1000 KIT ORAL EVERY 24 HOURS
Refills: 0 | Status: DISCONTINUED | OUTPATIENT
Start: 2024-06-14 | End: 2024-06-14

## 2024-06-14 RX ORDER — SODIUM CHLORIDE 0.65 %
1 AEROSOL, SPRAY (ML) NASAL
Refills: 0 | DISCHARGE

## 2024-06-14 RX ORDER — SODIUM BICARBONATE 650 MG/1
0.07 TABLET ORAL
Qty: 150 | Refills: 0 | Status: DISCONTINUED | OUTPATIENT
Start: 2024-06-14 | End: 2024-06-15

## 2024-06-14 RX ORDER — PANTOPRAZOLE SODIUM 40 MG/10ML
40 INJECTION, POWDER, FOR SOLUTION INTRAVENOUS DAILY
Refills: 0 | Status: DISCONTINUED | OUTPATIENT
Start: 2024-06-14 | End: 2024-06-17

## 2024-06-14 RX ORDER — CALCIUM GLUCONATE 98 MG/ML
6 INJECTION, SOLUTION INTRAVENOUS ONCE
Refills: 0 | Status: COMPLETED | OUTPATIENT
Start: 2024-06-14 | End: 2024-06-14

## 2024-06-14 RX ORDER — SODIUM CHLORIDE 0.65 %
2 AEROSOL, SPRAY (ML) NASAL
Refills: 0 | DISCHARGE

## 2024-06-14 RX ORDER — NOREPINEPHRINE BITARTRATE 1 MG/ML
0.05 INJECTION INTRAVENOUS
Qty: 8 | Refills: 0 | Status: DISCONTINUED | OUTPATIENT
Start: 2024-06-14 | End: 2024-06-14

## 2024-06-14 RX ORDER — POTASSIUM CHLORIDE 600 MG/1
1 TABLET, FILM COATED, EXTENDED RELEASE ORAL
Refills: 0 | DISCHARGE

## 2024-06-14 RX ORDER — ONDANSETRON 8 MG/1
4 TABLET, FILM COATED ORAL
Refills: 0 | DISCHARGE

## 2024-06-14 RX ORDER — ASPIRIN/CALCIUM CARB/MAGNESIUM 324 MG
1 TABLET ORAL
Refills: 0 | DISCHARGE

## 2024-06-14 RX ORDER — PHENYLEPHRINE HYDROCHLORIDE 10 MG/ML
0.1 INJECTION INTRAVENOUS
Qty: 40 | Refills: 0 | Status: DISCONTINUED | OUTPATIENT
Start: 2024-06-14 | End: 2024-06-15

## 2024-06-14 RX ORDER — METOCLOPRAMIDE 5 MG/5ML
1 SOLUTION ORAL
Refills: 0 | DISCHARGE

## 2024-06-14 RX ORDER — POTASSIUM PHOSPHATE, MONOBASIC POTASSIUM PHOSPHATE, DIBASIC INJECTION, 236; 224 MG/ML; MG/ML
30 SOLUTION, CONCENTRATE INTRAVENOUS ONCE
Refills: 0 | Status: COMPLETED | OUTPATIENT
Start: 2024-06-14 | End: 2024-06-14

## 2024-06-14 RX ORDER — DILTIAZEM HCL 120 MG
1 CAPSULE, EXT RELEASE 24 HR ORAL
Refills: 0 | DISCHARGE

## 2024-06-14 RX ORDER — DEXTROSE MONOHYDRATE AND SODIUM CHLORIDE 5; .3 G/100ML; G/100ML
1000 INJECTION, SOLUTION INTRAVENOUS ONCE
Refills: 0 | Status: COMPLETED | OUTPATIENT
Start: 2024-06-14 | End: 2024-06-14

## 2024-06-14 RX ORDER — LANOLIN ALCOHOL/MO/W.PET/CERES
1 CREAM (GRAM) TOPICAL
Refills: 0 | DISCHARGE

## 2024-06-14 RX ORDER — SODIUM BICARBONATE 650 MG/1
0.03 TABLET ORAL
Qty: 50 | Refills: 0 | Status: DISCONTINUED | OUTPATIENT
Start: 2024-06-14 | End: 2024-06-14

## 2024-06-14 RX ORDER — DOCUSATE SODIUM 100 MG
3 CAPSULE ORAL
Refills: 0 | DISCHARGE

## 2024-06-14 RX ORDER — POLYETHYLENE GLYCOL 3350 1 G/G
17 POWDER ORAL DAILY
Refills: 0 | Status: DISCONTINUED | OUTPATIENT
Start: 2024-06-14 | End: 2024-06-15

## 2024-06-14 RX ORDER — PHENAZOPYRIDINE HCL 100 MG
1 TABLET ORAL
Refills: 0 | DISCHARGE

## 2024-06-14 RX ORDER — PHENAZOPYRIDINE HCL 200 MG/1
1 TABLET ORAL
Refills: 0 | DISCHARGE

## 2024-06-14 RX ORDER — METFORMIN HYDROCHLORIDE 850 MG/1
1 TABLET ORAL
Refills: 0 | DISCHARGE

## 2024-06-14 RX ORDER — FAMOTIDINE 10 MG/ML
1 INJECTION INTRAVENOUS
Refills: 0 | DISCHARGE

## 2024-06-14 RX ORDER — ATORVASTATIN CALCIUM 80 MG/1
1 TABLET, FILM COATED ORAL
Refills: 0 | DISCHARGE

## 2024-06-14 RX ORDER — DEXTROSE MONOHYDRATE 100 MG/ML
125 INJECTION, SOLUTION INTRAVENOUS ONCE
Refills: 0 | Status: DISCONTINUED | OUTPATIENT
Start: 2024-06-14 | End: 2024-07-05

## 2024-06-14 RX ORDER — VASOPRESSIN 20 UNIT/ML
0.04 VIAL (ML) INJECTION
Qty: 40 | Refills: 0 | Status: DISCONTINUED | OUTPATIENT
Start: 2024-06-14 | End: 2024-06-15

## 2024-06-14 RX ADMIN — POTASSIUM CHLORIDE 100 MILLIEQUIVALENT(S): 600 TABLET, FILM COATED, EXTENDED RELEASE ORAL at 13:35

## 2024-06-14 RX ADMIN — INSULIN LISPRO 10 UNIT(S): 100 INJECTION, SOLUTION SUBCUTANEOUS at 03:28

## 2024-06-14 RX ADMIN — Medication 400 MILLIGRAM(S): at 18:17

## 2024-06-14 RX ADMIN — SODIUM BICARBONATE 50 MEQ/KG/HR: 650 TABLET ORAL at 06:47

## 2024-06-14 RX ADMIN — POTASSIUM CHLORIDE 100 MILLIEQUIVALENT(S): 600 TABLET, FILM COATED, EXTENDED RELEASE ORAL at 11:02

## 2024-06-14 RX ADMIN — VANCOMYCIN HYDROCHLORIDE 250 MILLIGRAM(S): KIT at 02:41

## 2024-06-14 RX ADMIN — EPINEPHRINE 15.3 MICROGRAM(S)/KG/MIN: 0.3 INJECTION SUBCUTANEOUS at 02:14

## 2024-06-14 RX ADMIN — AMIODARONE HYDROCHLORIDE 600 MILLIGRAM(S): 50 INJECTION, SOLUTION INTRAVENOUS at 16:44

## 2024-06-14 RX ADMIN — NOREPINEPHRINE BITARTRATE 9.5 MICROGRAM(S)/KG/MIN: 1 INJECTION INTRAVENOUS at 11:46

## 2024-06-14 RX ADMIN — Medication 2.53 MG/KG/HR: at 11:30

## 2024-06-14 RX ADMIN — EPINEPHRINE 15.3 MICROGRAM(S)/KG/MIN: 0.3 INJECTION SUBCUTANEOUS at 06:47

## 2024-06-14 RX ADMIN — NOREPINEPHRINE BITARTRATE 9.5 MICROGRAM(S)/KG/MIN: 1 INJECTION INTRAVENOUS at 06:11

## 2024-06-14 RX ADMIN — Medication 15 MILLILITER(S): at 06:12

## 2024-06-14 RX ADMIN — Medication 1 APPLICATION(S): at 11:16

## 2024-06-14 RX ADMIN — PROPOFOL 2.45 MICROGRAM(S)/KG/MIN: 10 INJECTION, EMULSION INTRAVENOUS at 01:43

## 2024-06-14 RX ADMIN — Medication 250 MICROGRAM(S): at 13:50

## 2024-06-14 RX ADMIN — NOREPINEPHRINE BITARTRATE 9.5 MICROGRAM(S)/KG/MIN: 1 INJECTION INTRAVENOUS at 02:42

## 2024-06-14 RX ADMIN — DEXTROSE MONOHYDRATE AND SODIUM CHLORIDE 1000 MILLILITER(S): 5; .3 INJECTION, SOLUTION INTRAVENOUS at 10:26

## 2024-06-14 RX ADMIN — PANTOPRAZOLE SODIUM 40 MILLIGRAM(S): 40 INJECTION, POWDER, FOR SOLUTION INTRAVENOUS at 11:46

## 2024-06-14 RX ADMIN — NOREPINEPHRINE BITARTRATE 33.2 MICROGRAM(S)/KG/MIN: 1 INJECTION INTRAVENOUS at 15:37

## 2024-06-14 RX ADMIN — DEXTROSE MONOHYDRATE AND SODIUM CHLORIDE 75 MILLILITER(S): 5; .3 INJECTION, SOLUTION INTRAVENOUS at 01:44

## 2024-06-14 RX ADMIN — POTASSIUM CHLORIDE 100 MILLIEQUIVALENT(S): 600 TABLET, FILM COATED, EXTENDED RELEASE ORAL at 11:46

## 2024-06-14 RX ADMIN — PROPOFOL 2.45 MICROGRAM(S)/KG/MIN: 10 INJECTION, EMULSION INTRAVENOUS at 08:34

## 2024-06-14 RX ADMIN — POTASSIUM PHOSPHATE, MONOBASIC POTASSIUM PHOSPHATE, DIBASIC INJECTION, 83.33 MILLIMOLE(S): 236; 224 SOLUTION, CONCENTRATE INTRAVENOUS at 13:32

## 2024-06-14 RX ADMIN — Medication 15 MILLILITER(S): at 18:17

## 2024-06-14 RX ADMIN — Medication 10 UNIT(S)/HR: at 06:47

## 2024-06-14 RX ADMIN — CALCIUM GLUCONATE 500 GRAM(S): 98 INJECTION, SOLUTION INTRAVENOUS at 14:00

## 2024-06-14 RX ADMIN — Medication 1 APPLICATION(S): at 06:13

## 2024-06-14 RX ADMIN — Medication 25 GRAM(S): at 01:47

## 2024-06-14 RX ADMIN — EPINEPHRINE 15.3 MICROGRAM(S)/KG/MIN: 0.3 INJECTION SUBCUTANEOUS at 11:24

## 2024-06-14 RX ADMIN — Medication 1000 MILLIGRAM(S): at 18:36

## 2024-06-14 RX ADMIN — Medication 2.53 MG/KG/HR: at 19:07

## 2024-06-14 RX ADMIN — AMIODARONE HYDROCHLORIDE 33.3 MG/MIN: 50 INJECTION, SOLUTION INTRAVENOUS at 16:50

## 2024-06-14 RX ADMIN — PHENYLEPHRINE HYDROCHLORIDE 3.8 MICROGRAM(S)/KG/MIN: 10 INJECTION INTRAVENOUS at 21:38

## 2024-06-14 NOTE — DIETITIAN INITIAL EVALUATION ADULT - PERTINENT MEDS FT
MEDICATIONS  (STANDING):  chlorhexidine 0.12% Liquid 15 milliLiter(s) Oral Mucosa every 12 hours  chlorhexidine 2% Cloths 1 Application(s) Topical daily  dextrose 10% Bolus 125 milliLiter(s) IV Bolus once  dextrose 5%. 1000 milliLiter(s) (100 mL/Hr) IV Continuous <Continuous>  dextrose 5%. 1000 milliLiter(s) (50 mL/Hr) IV Continuous <Continuous>  dextrose 50% Injectable 25 Gram(s) IV Push once  dextrose 50% Injectable 12.5 Gram(s) IV Push once  EPINEPHrine    Infusion 0.05 MICROgram(s)/kG/Min (15.3 mL/Hr) IV Continuous <Continuous>  glucagon  Injectable 1 milliGRAM(s) IntraMuscular once  insulin regular Infusion 10 Unit(s)/Hr (10 mL/Hr) IV Continuous <Continuous>  ketamine Infusion. 0.25 mG/kG/Hr (2.53 mL/Hr) IV Continuous <Continuous>  naloxegol 25 milliGRAM(s) Oral daily  norepinephrine Infusion 0.05 MICROgram(s)/kG/Min (9.5 mL/Hr) IV Continuous <Continuous>  pantoprazole  Injectable 40 milliGRAM(s) IV Push daily  polyethylene glycol 3350 17 Gram(s) Oral daily  potassium chloride  10 mEq/100 mL IVPB 10 milliEquivalent(s) IV Intermittent every 1 hour  potassium phosphate IVPB 30 milliMole(s) IV Intermittent once  propofol Infusion 5 MICROgram(s)/kG/Min (2.45 mL/Hr) IV Continuous <Continuous>  senna 2 Tablet(s) Oral at bedtime  sodium bicarbonate  Infusion 0.074 mEq/kG/Hr (50 mL/Hr) IV Continuous <Continuous>    MEDICATIONS  (PRN):  dextrose Oral Gel 15 Gram(s) Oral once PRN Blood Glucose LESS THAN 70 milliGRAM(s)/deciliter  sodium chloride 0.9% lock flush 10 milliLiter(s) IV Push every 1 hour PRN Pre/post blood products, medications, blood draw, and to maintain line patency

## 2024-06-14 NOTE — CARE COORDINATION ASSESSMENT. - OTHER PERTINENT REFERRAL INFORMATION
Pt is current Intubated and can not provide information. Sw spoke wMarcell, Unit Manager at  and per unit manager,PT does not have any family contacts but has a Friend Josseline Vargas [phone]. Unit manager to speak with SW and reach back out if there are any contacts for family as pt is not doing well. Pt was recently discharged on 6/13 after he lost his appeal but returned the same evening. Pt has assistance from staff from .

## 2024-06-14 NOTE — PROGRESS NOTE ADULT - ATTENDING COMMENTS
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    --due to severe shock state transition propofol to ketamine for sedation  --severe shock state, formal echo prelim shows acceptable LV function, no pericardial effusion, collapsable IVC  wean off epi due to tachycardia  continue levo, may need vaso  --initial bradycardic shock may have been due to excess CCB/BB? give Ca gluconate and glucagon  now   uncontrolled afib, after dig is better but still poorly controlled, start amio load  remains with coagulopathy from eliquis in setting of OBI  --acute hypoxemia respiratory failure  increase ventilation due to severe metabolic acidosis  --OBI likely ATN, metabolic acidosis, optimize volume status as above, continue bicarb gtt  severe lactic acidosis improving  --NPO due to severe shock state  shcok liver, continue supportive care  --MRSA osteomyelitis, continue vanc, f/u Cx data  --uncontrolled DM, insulin gtt  --sister updated by Dr. Walls

## 2024-06-14 NOTE — CONSULT NOTE ADULT - ASSESSMENT
53 year old male with a PMH of AF on Eliquis, indwelling jacques, CAD s/p stents, CVA, DM, HTN, hx PE, recent hospitalization discharged yesterday after an admission for R heel osteomyelitis w/ debridement who presented to the ED from [facility] for difficulty breathing.    Problem list:  Asystolic cardiac arrest  Acute hypoxic respiratory failure  Lactic/respiratory acidosis  OBI  Transaminitis  Hypomagnesemia  Afib  Elevated INR    Neuro: Initiated on propofol for sedation for patient comfort.  Will decrease dose as much as possible to assess neurologic status and minimize hypotension.  CV: Will transition levophed to epi gtt for increased chronotropy given profound bradycardia and asystolic arrests.  Holding home CCB and BB given this.  EKG on arrival with slow afib but no signs of ischemia.  Troponin negative.  Goal MAP >65.   Pulm: On full vent support.  Will lower fiO2 as tolerated to maintain spO2 >92%.  SBTs as able.   GI: NPO.  Initiate tube feeds if unable to extubate.  Bloody secretions noted from NGT.  CT abd/pel ordered to further assess this and abdominal distension.  Will initiate Protonix for GI prophylaxis.   Renal: OBI likely pre-renal given shock state.  Profound lactic acidosis likely from cardiac arrest vs ? metformin toxicity component.  Will initiate IVF hydration with LR.  Trend lactate.  Consider bicarb gtt if no improvement.  Repleting electrolytes for K>4, Mg>2.   Heme: On Eliquis as an outpatient.  Holding for now pending CT results and given elevated INR.  SCDs for DVT prophylaxis.   ID: Med rec from facility revealed patient was receiving ertapenem and vancomycin, however as per prior ID notes ertapenem was stopped due to development of rash.  Will send vanco level now.  Wound/tissue cultures from prior admission with MRSA, ESBL E.coli, E faecalis.  Likely resume vanco pending level.  Consult ID for further abx recs.  Jacques exchanged on arrival to ICU.  Will send UA/ucx.  F/u blood cx.   Endocrine: Hyperglycemic.  ISS q6 hrs.  Will hold lantus while NPO.  TSH elevated.  Sending T3/T4.  Did not see levothyroxine in med list.     [facility] conversation documented 6/13 confirmed patient full code.  Attempted to reach patient's emergency contact at the number listed in chart but the call did not go through x2.  Case discussed w/ E-ICU attending Dr. Cohen.     65 minutes of critical care time spent assessing presenting problems of acute illness, which pose high probability of life threatening deterioration or end organ damage/dysfunction, as well as medical decision making including initiating plan of care, reviewing data, reviewing radiologic exams, discussing with multidisciplinary team,  discussing goals of care with patient/family, and writing this note.  Non-inclusive of procedures performed.  Date of entry of this note is equal to the date of services rendered.  53 year old male with a PMH of AF on Eliquis, indwelling jacques, CAD s/p stents, CVA, DM, HTN, hx PE, recent hospitalization discharged yesterday after an admission for R heel osteomyelitis w/ debridement who presented to the ED from [facility] for difficulty breathing.    Problem list:  Asystolic cardiac arrest  Acute hypoxic respiratory failure  Shock (septic v cardiogenic)   Lactic/respiratory acidosis  OBI  Transaminitis  Hypomagnesemia  Afib  Elevated INR    Neuro: Initiated on propofol for sedation for patient comfort.  Will decrease dose as much as possible to assess neurologic status and minimize hypotension.  CV: Will transition levophed to epi gtt for increased chronotropy given bradycardia and asystolic arrests.  Holding home CCB and BB given this.  EKG on arrival with slow afib but no signs of ischemia.  Troponin negative.  Goal MAP >65.  Echo from March w/ normal LVEF.    Pulm: On full vent support.  Will lower fiO2 as tolerated to maintain spO2 >92%.  SBTs as able.   GI: NPO.  Initiate tube feeds if unable to extubate.  Bloody secretions noted from NGT.  CT abd/pel ordered to further assess this and abdominal distension.  Will initiate Protonix for GI prophylaxis.   Renal: OBI likely pre-renal given shock state.  Profound lactic acidosis likely from cardiac arrest vs ? metformin toxicity component.  Will initiate IVF hydration with LR.  Trend lactate.  Consider bicarb gtt if no improvement.  Repleting electrolytes for K>4, Mg>2.   Heme: On Eliquis as an outpatient.  Holding for now pending CT results and given elevated INR.  SCDs for DVT prophylaxis.   ID: Med rec from facility revealed patient was receiving ertapenem and vancomycin, however as per prior ID notes ertapenem was stopped due to development of rash.  Will send vanco level now.  Wound/tissue cultures from prior admission with MRSA, ESBL E.coli, E faecalis.  Likely resume vanco pending level.  Consult ID for further abx recs.  Jacques exchanged on arrival to ICU.  Will send UA/ucx.  F/u blood cx.   Endocrine: Hyperglycemic.  ISS q6 hrs.  Will hold lantus while NPO.  TSH elevated.  Sending T3/T4.  Did not see levothyroxine in med list.     [facility] conversation documented 6/13 confirmed patient full code.  Attempted to reach patient's emergency contact at the number listed in chart but the call did not go through x2.  Case discussed w/ E-ICU attending Dr. Cohen.     65 minutes of critical care time spent assessing presenting problems of acute illness, which pose high probability of life threatening deterioration or end organ damage/dysfunction, as well as medical decision making including initiating plan of care, reviewing data, reviewing radiologic exams, discussing with multidisciplinary team,  discussing goals of care with patient/family, and writing this note.  Non-inclusive of procedures performed.  Date of entry of this note is equal to the date of services rendered.

## 2024-06-14 NOTE — PHARMACOTHERAPY INTERVENTION NOTE - COMMENTS
Vancomycin Dosing Per Pharmacy:  Patient is a 53 year old male being treated for a R heel wound/osteomyelitis, currently being treated with vancomycin x 6 weeks until 7/18. Patient readmitted overnight, with OBI 2/2 ATN from cardiac arrest (SCr 3.5). Discussed with ICU team and Dr. Whitten and recommended vancomycin dosing by levels due to worsening renal function. Next level due 6/15 at 5am.    1. Indication for the vancomycin: R heel wound/osteomyelitis  2. Requesting Provider: Dr. Anne-Marie Li/Dr. Whitten  3. Current plan and dosing regimen: dosing by levels  4. Day of therapy: 11  5. Cultures: tissue culture 5/26/24: E faecalis (susceptible to vancomycin) and MRSA. Tissue culture 06/04/24: ESBL E coli and E faecalis susceptible to vancomycin  6. Target trough or AUC/BAYLEE: 400-600  7. Day and time level is due: 6/15 at 05:00  8. Previous levels: 6/4 (19:31): 22, 6/5 (05:47): 14, 6/6 (20:20): 14, 6/9 (04:58): 14.4, 6/11 (04:30): 15.7, 6/13 (05:54): 17.3, 6/14 (01:25): 15.2, 6/14 (04:30): 16.1  9. Expected 24-hour AUC: 476

## 2024-06-14 NOTE — PATIENT PROFILE ADULT - NSPROHMDIABETMGMTSTRAT_GEN_A_NUR
pt intubated tico pt intubated tico/activity/adequate rest/blood glucose testing/diet modification/weight management

## 2024-06-14 NOTE — DISCHARGE NOTE PROVIDER - NSDCCPCAREPLAN_GEN_ALL_CORE_FT
PRINCIPAL DISCHARGE DIAGNOSIS  Diagnosis: Acute respiratory failure with hypoxia  Assessment and Plan of Treatment: follow up with rehab MD Dr. Murray      SECONDARY DISCHARGE DIAGNOSES  Diagnosis: Bradycardic cardiac arrest  Assessment and Plan of Treatment:

## 2024-06-14 NOTE — DIETITIAN INITIAL EVALUATION ADULT - PERTINENT LABORATORY DATA
06-14    139  |  103  |  51<H>  ----------------------------<  457<HH>  3.0<L>   |  14<L>  |  3.50<H>    Ca    8.1<L>      14 Jun 2024 10:05  Phos  0.8     06-14  Mg     1.9     06-14    TPro  6.8  /  Alb  2.7<L>  /  TBili  0.7  /  DBili  x   /  AST  385<H>  /  ALT  435<H>  /  AlkPhos  152<H>  06-14  POCT Blood Glucose.: 396 mg/dL (06-14-24 @ 10:21)  A1C with Estimated Average Glucose Result: 7.4 % (05-28-24 @ 11:45)  A1C with Estimated Average Glucose Result: 7.0 % (03-17-24 @ 06:41)  A1C with Estimated Average Glucose Result: 7.0 % (12-20-23 @ 08:20)

## 2024-06-14 NOTE — ED ADULT NURSE NOTE - OBJECTIVE STATEMENT
Pt. sent in [facility]  from [facility] for C/O weakness/hypotension/SOB. pt presents tot ED with sawyer.

## 2024-06-14 NOTE — DIETITIAN INITIAL EVALUATION ADULT - ENTERAL
If remains intubated, recommend initiation of enteral feeds via NGT of Glucerna 1.5 30cc/hr x 18 hrs, 12 noon to 6pm.  Advance feeds by 10cc Q8h until goal rate of 85cc/hr x 18 hrs achieved. (2295 bert, 126 gm protein)

## 2024-06-14 NOTE — DISCHARGE NOTE PROVIDER - HOSPITAL COURSE
HPI:  Known from rehab  Claude Villareal is a 54 YO M with past medical history of  AF on Eliquis, indwelling Aguilera, CAD s/p stents, CVA, DMT 2, Hypertension, osteomyelitis s/p debridement, PE, transferred from [facility] for difficulty in breathing.  His recent admission was for osteomyelitis and discharged on IV Vancomycin. On ED arrival he is unresponsive, apneic, no palpable pulse or blood pressure, EKG rhythm HR < 20/min W/O P waves. Patient intubated, Code ACLS activated, IV epinephrine, IV atropine administered, pulse became palpable, still Hypotensive, IV levophed administered. Admitted to ICU consulted,  Patient woke up and agitated try to pull out ETT,  IV propofol started,  Patient had no nausea or vomiting in ELIAS.   (13 Jun 2024 23:25)      ---  HOSPITAL COURSE:     ICU Course: Patient with recent hospitalization for R heel OM s/p debridement and discharged yesterday presented to the ED from [facility] for difficulty breathing.    On arrival to the ED patient was found to be apneic and bradycardic.  He was intubated, briefly received CPR, and received IV epi and atropine with ROSC.  He was initiated on levophed.  Workup revealed a leukocytosis, OBI, hyperglycemia, metabolic/respiratory acidosis, transaminitis.  ICU consulted for the above. On evaluation, patient bradycardic and then proceeded to suffer another brief asystolic cardiac arrest.  Given one epi and one bicarb with ROSC.  Transferred to ICU for further management.   Patient was sedated with ketamine and ****  Treated for Cardiogenic shock 2/2 bradycardia vs distributive shock 2/2 anaphylactic reaction to ertapenem.   Given digoxin 250mcg x1 then required amio gtt to stablize HR.  Required multiple pressors (levo, epi then transitioned to vasopressin and levo) and full vent support.   Now tolerating ____.   NGT to suction, multiple dilated bowel loops seen on CTAP.   Also presented with ATN likely 2/2 cardiac arrest w/ profound lactic acidosis. Lactic acidosis now ***resolved***  Continued on vancomycin for ongoing treatment for OM s/p R heel debridement.   Blood cx ____  Initially requiring insulin gtt for hyperglycemia. Now _____.  Home Eliquis initially held 2/2 elevated INR. Now ____.       Patient's clinical condition improved throughout hospital course and patient is stable for discharge *** with close outpatient follow up.     ---  CONSULTANTS:     ---  Physical Exam on the day of discharge:    ---  TIME SPENT:  I, the attending physician, was physically present for the key portions of the evaluation and management (E/M) service provided. The total amount of time spent reviewing the hospital notes, laboratory values, imaging findings, assessing/counseling the patient, discussing with consultant physicians, social work, nursing staff was -- minutes    ---  Primary care provider was made aware of plan for discharge:      [  ] NO     [  ] YES HPI:  Known from rehab  Claude Villareal is a 54 YO M with past medical history of  AF on Eliquis, indwelling Aguilera, CAD s/p stents, CVA, DMT 2, Hypertension, osteomyelitis s/p debridement, PE, transferred from [facility] for difficulty in breathing.  His recent admission was for osteomyelitis and discharged on IV Vancomycin. On ED arrival he is unresponsive, apneic, no palpable pulse or blood pressure, EKG rhythm HR < 20/min W/O P waves. Patient intubated, Code ACLS activated, IV epinephrine, IV atropine administered, pulse became palpable, still Hypotensive, IV levophed administered. Admitted to ICU consulted,  Patient woke up and agitated try to pull out ETT,  IV propofol started,  Patient had no nausea or vomiting in ELIAS.   (13 Jun 2024 23:25)      ---  HOSPITAL COURSE:     ICU Course: Patient with recent hospitalization for R heel OM s/p debridement and discharged yesterday presented to the ED from [facility] for difficulty breathing.    On arrival to the ED patient was found to be apneic and bradycardic.  He was intubated, briefly received CPR, and received IV epi and atropine with ROSC.  He was initiated on levophed.  Workup revealed a leukocytosis, OBI, hyperglycemia, metabolic/respiratory acidosis, transaminitis.  ICU consulted for the above. On evaluation, patient bradycardic and then proceeded to suffer another brief asystolic cardiac arrest.  Given one epi and one bicarb with ROSC.  Transferred to ICU for further management.   Patient was sedated with ketamine. Ketamine discontinued and started on propofol & precedex. Weaning off of propofol ***   Treated for Cardiogenic shock 2/2 bradycardia vs distributive shock 2/2 anaphylactic reaction to ertapenem. Unclear etiology for cardiac arrest.   Given digoxin 250mcg x1 then required amio gtt to stablize HR. Amio gtt was discontinued, started on lopressor for rate control. ***  Required multiple pressors (levo, epi then transitioned to vasopressin and levo) and full vent support.   Now pressors discontinued and tolerating SBT. ***   NGT to suction, multiple dilated bowel loops seen on CTAP. NGT suction drainage now minimal; started on tube feeds, tolerated trickle feeds, now full tube feed diet.   Also presented with ATN likely 2/2 cardiac arrest w/ profound lactic acidosis. Lactate now normalized, acidosis resolved on ABG.   Continued on vancomycin for ongoing treatment for OM s/p R heel debridement.   UA showing large LE, neg nitrites, WBC TNTC, few bacteria; UCx showing ____. Consider abx coverage for UTI if clinically indicated. ***   Blood cx _____   Initially requiring insulin gtt for hyperglycemia. Now discontinued, on MDISS.   Home Eliquis initially held 2/2 elevated INR and increased renal indices. Now started on Lovenox FD for AC. ***      Patient's clinical condition improved throughout hospital course and patient is stable for discharge *** with close outpatient follow up.     ---  CONSULTANTS:     ---  Physical Exam on the day of discharge:    ---  TIME SPENT:  I, the attending physician, was physically present for the key portions of the evaluation and management (E/M) service provided. The total amount of time spent reviewing the hospital notes, laboratory values, imaging findings, assessing/counseling the patient, discussing with consultant physicians, social work, nursing staff was -- minutes    ---  Primary care provider was made aware of plan for discharge:      [  ] NO     [  ] YES HPI:  Known from rehab  Claude Villareal is a 54 YO M with past medical history of  AF on Eliquis, indwelling Aguilera, CAD s/p stents, CVA, DMT 2, Hypertension, osteomyelitis s/p debridement, PE, transferred from [facility] for difficulty in breathing.  His recent admission was for osteomyelitis and discharged on IV Vancomycin. On ED arrival he is unresponsive, apneic, no palpable pulse or blood pressure, EKG rhythm HR < 20/min W/O P waves. Patient intubated, Code ACLS activated, IV epinephrine, IV atropine administered, pulse became palpable, still Hypotensive, IV levophed administered. Admitted to ICU consulted,  Patient woke up and agitated try to pull out ETT,  IV propofol started,  Patient had no nausea or vomiting in ELIAS.   (13 Jun 2024 23:25)      ---  HOSPITAL COURSE:     ICU Course: Patient with recent hospitalization for R heel OM s/p debridement and discharged yesterday presented to the ED from [facility] for difficulty breathing.    On arrival to the ED patient was found to be apneic and bradycardic.  He was intubated, briefly received CPR, and received IV epi and atropine with ROSC.  He was initiated on levophed.  Workup revealed a leukocytosis, OBI, hyperglycemia, metabolic/respiratory acidosis, transaminitis.  ICU consulted for the above. On evaluation, patient bradycardic and then proceeded to suffer another brief asystolic cardiac arrest.  Given one epi and one bicarb with ROSC.  Transferred to ICU for further management.   Patient was sedated with ketamine. Ketamine discontinued and started on propofol & precedex. Weaning off of propofol ***   Treated for Cardiogenic shock 2/2 bradycardia vs distributive shock 2/2 anaphylactic reaction to ertapenem. Unclear etiology for cardiac arrest.   Given digoxin 250mcg x1 then required amio gtt to stablize HR. Amio gtt was discontinued, started on lopressor for rate control. ***  Required multiple pressors (levo, epi then transitioned to vasopressin and levo) and full vent support.   Now pressors discontinued and tolerating SBT. ***   NGT to suction, multiple dilated bowel loops seen on CTAP. NGT suction drainage now minimal; started on tube feeds, tolerated trickle feeds, now full tube feed diet.   Also presented with ATN likely 2/2 cardiac arrest w/ profound lactic acidosis. Lactate now normalized, acidosis resolved on ABG.   Continued on vancomycin for ongoing treatment for OM s/p R heel debridement.   Initially requiring insulin gtt for hyperglycemia. Now discontinued, on MDISS.   Home Eliquis initially held 2/2 elevated INR and increased renal indices. Now started on Lovenox FD for AC. ***  underwent 2nd debridement of right heel.  Doses of beta blocker and calcium channel blocker adjusted and readjusted by cardio multiple times.  DC after cardio and podiatry clearance.  Needs out patient ischemic work up      Patient's clinical condition improved throughout hospital course and patient is stable for discharge *** with close outpatient follow up.     ---  CONSULTANTS:     ---  Physical Exam on the day of discharge:    ---  TIME SPENT:  I, the attending physician, was physically present for the key portions of the evaluation and management (E/M) service provided. The total amount of time spent reviewing the hospital notes, laboratory values, imaging findings, assessing/counseling the patient, discussing with consultant physicians, social work, nursing staff was -- minutes    ---  Primary care provider was made aware of plan for discharge:      [  ] NO     [  ] YES HPI:  Known from rehab  Claude Villareal is a 54 YO M with past medical history of  AF on Eliquis, indwelling Aguilera, CAD s/p stents, CVA, DMT 2, Hypertension, osteomyelitis s/p debridement, PE, transferred from [facility] for difficulty in breathing.  His recent admission was for osteomyelitis and discharged on IV Vancomycin. On ED arrival he is unresponsive, apneic, no palpable pulse or blood pressure, EKG rhythm HR < 20/min W/O P waves. Patient intubated, Code ACLS activated, IV epinephrine, IV atropine administered, pulse became palpable, still Hypotensive, IV levophed administered. Admitted to ICU consulted,  Patient woke up and agitated try to pull out ETT,  IV propofol started,  Patient had no nausea or vomiting in ELIAS.   (13 Jun 2024 23:25)      ---  HOSPITAL COURSE:     ICU Course: Patient with recent hospitalization for R heel OM s/p debridement and discharged yesterday presented to the ED from [facility] for difficulty breathing.    On arrival to the ED patient was found to be apneic and bradycardic.  He was intubated, briefly received CPR, and received IV epi and atropine with ROSC.  He was initiated on levophed.  Workup revealed a leukocytosis, OBI, hyperglycemia, metabolic/respiratory acidosis, transaminitis.  ICU consulted for the above. On evaluation, patient bradycardic and then proceeded to suffer another brief asystolic cardiac arrest.  Given one epi and one bicarb with ROSC.  Transferred to ICU for further management.   Patient was sedated with ketamine. Ketamine discontinued and started on propofol & precedex. Weaning off of propofol ***   Treated for Cardiogenic shock 2/2 bradycardia vs distributive shock 2/2 anaphylactic reaction to ertapenem. Unclear etiology for cardiac arrest.   Given digoxin 250mcg x1 then required amio gtt to stablize HR. Amio gtt was discontinued, started on lopressor for rate control.  Required multiple pressors (levo, epi then transitioned to vasopressin and levo) and full vent support.   Now pressors discontinued and tolerating SBT.   NGT to suction, multiple dilated bowel loops seen on CTAP. NGT suction drainage now minimal; started on tube feeds, tolerated trickle feeds, now full tube feed diet.   Also presented with ATN likely 2/2 cardiac arrest w/ profound lactic acidosis. Lactate now normalized, acidosis resolved on ABG.   Continued on vancomycin for ongoing treatment for OM s/p R heel debridement.   Initially requiring insulin gtt for hyperglycemia. Now discontinued, on MDISS.   Home Eliquis initially held 2/2 elevated INR and increased renal indices. Now started on Lovenox FD for AC.   underwent 2nd debridement of right heel.  Doses of beta blocker and calcium channel blocker adjusted and readjusted by cardio multiple times.  DC after cardio and podiatry clearance.  Needs out patient ischemic work up  Patient going with wound vac    Patient's clinical condition improved throughout hospital course and patient is stable for discharge with close outpatient follow up.     >35 minutes spent on discharge

## 2024-06-14 NOTE — DIETITIAN INITIAL EVALUATION ADULT - NSFNSGIIOFT_GEN_A_CORE
06-13-24 @ 07:01  -  06-14-24 @ 07:00  --------------------------------------------------------  OUT:    Nasogastric/Oral tube (mL): 10 mL  Total OUT: 10 mL    Total NET: -10 mL

## 2024-06-14 NOTE — DISCHARGE NOTE PROVIDER - CARE PROVIDERS DIRECT ADDRESSES
,DirectAddress_Unknown,[email or web address]

## 2024-06-14 NOTE — CONSULT NOTE ADULT - ASSESSMENT
54 YO M with past medical history of  AFib (on Eliquis), indwelling Agiulera, cerebral aneurysm, CAD (s/p stents), CVA, T2DM, HTN, OM (s/p debridement), PE, perforatedd gastric ulcer s/p ometnopexy 2019 with Dr. Mejia, transferred from [facility] for difficulty in breathing.  His recent admission was for osteomyelitis and discharged on IV Vancomycin, admitted s/p cardiac arrest x2 w/ AHRF admitted to the ICU currently intubated, sedated, on levophed and epinephrine, NGT placed in the ED. Cardiology consulted for cardiac arrest and afib.    #Cardiac Arrest  - Patient now in ICU s/p cardiac arrest x2, CPR with ROSC currently intubated and sedated   - Cardiac arrest likely 2/2 hypomagnesemia  - Hold all BP medications until hemodynamically stable with de-escalation of vasopressors  - Pressor support w/ levophed and epinephrine goal MAP > 60, SBP >90  - Troponin 32.7, continue to trend cardiac enzymes  - TTE in 3/24: 3/18/24:  LVEF 50-55%, mildly reduced RV systolic function, mod dilated LA and RA, mod to sev MR, mild to mod TR, PASP 36  - Continuous cardiac monitoring for arrhythmias  - Serial EKGs    #Ischemia   - No clear evidence of acute ischemia  - Troponin negative, continue to trend  - EKG with no ischemic changes  - Hx of CAD: Holding ASA  - Serial EKGs, Continue to monitor for signs of ischemia     #Afib  - Pt initially in slow afib on admission, likely 2/2 sick sinus/ SA node dysfunction  - EKG on admission: Junctional bradycardia Rate 35bpm, with PSVCs and with occasional PVCs. RAD. Incomplete RBB  - Upon examination in Afib rate 120bpm, however remains hypotensive on pressors  - Hold home Cardizem and toprol  - Holding eliquis for now    #HTN  - Cardiogenic vs septic shock s/p cardiac arrest, now on pressors  - Hold all BP medications until hemodynamically stable with de-escalation of vasopressors  - Pressor support w/ levophed and epinephrine goal MAP > 60, SBP >90  - Continue to monitor hemodynamics     - Other cardiovascular workup will depend on clinical course.  - All other workup per ICU  - Will continue to follow.     52 YO M with past medical history of  AFib (on Eliquis), indwelling Aguilera, cerebral aneurysm, CAD (s/p stents), CVA, T2DM, HTN, OM (s/p debridement), PE, perforatedd gastric ulcer s/p ometnopexy 2019 with Dr. Mejia, transferred from [facility] for difficulty in breathing.  His recent admission was for osteomyelitis and discharged on IV Vancomycin, admitted s/p cardiac arrest x2 w/ AHRF admitted to the ICU currently intubated, sedated, on levophed and epinephrine, NGT placed in the ED. Cardiology consulted for cardiac arrest and afib.    #Cardiac Arrest  - Patient now in ICU s/p cardiac arrest x2, CPR with ROSC currently intubated and sedated   - Cardiac arrest likely iatrogenic in setting of receiving double dose of metoprolol and cardizem, as well as ertapenem w/ likely allergy  - Hold all BP medications until hemodynamically stable with de-escalation of vasopressors  - Pressor support w/ goal MAP > 60, SBP >90  - Troponin 32.7, continue to trend cardiac enzymes  - TTE in 3/24: 3/18/24:  LVEF 50-55%, mildly reduced RV systolic function, mod dilated LA and RA, mod to sev MR, mild to mod TR, PASP 36  - Does not appear clinically volume overloaded  - Continuous cardiac monitoring for arrhythmias  - Serial EKGs    #Ischemia   - No clear evidence of acute ischemia  - Troponin negative, continue to trend  - EKG with no ischemic changes  - Hx of CAD: Holding ASA, resume when able  - Serial EKGs, Continue to monitor for signs of ischemia     #Afib  - Pt initially in slow afib on admission, iatrogenic  - EKG on admission: Junctional bradycardia Rate 35bpm, with PSVCs and with occasional PVCs. RAD. Incomplete RBB  - Upon examination in Afib rate 140s  - Recommend altering vasopressor support or sedation to agent to not cause further tachy  - Resume BB/cardizem as BPs tolerate, however since pt w/ ileus recommend transitioning to IV metoprolol 2.5mg q6h and cardizem drip 5mg/Hr @ 5mL/hr  - Holding eliquis for now, resume when able    #HTN  - Cardiogenic vs distributive shock s/p BB/cardizem overdose. Now cardiac arrest, on pressors  - Hold all BP medications until hemodynamically stable with de-escalation of vasopressors  - Recommend altering vasopressor support to agent with unopposed alpha-1 mediated vasoconstriction as to not cause further tachycardia ie neosynepherine with unopposed alpha-1 mediated vasoconstriction  - Pressor support w/ levophed and epinephrine goal MAP > 60, SBP >90  - Continue to monitor hemodynamics     - Other cardiovascular workup will depend on clinical course.  - All other workup per ICU  - Will continue to follow.     54 YO M with past medical history of  AFib (on Eliquis), indwelling Aguilera, cerebral aneurysm, CAD (s/p stents), CVA, T2DM, HTN, OM (s/p debridement), PE, perforated gastric ulcer s/p ometnopexy 2019 with Dr. Mejia, transferred from [facility] for difficulty in breathing.  His recent admission was for osteomyelitis and discharged on IV Vancomycin, admitted s/p cardiac arrest x2 w/ AHRF admitted to the ICU currently intubated, sedated, on levophed and epinephrine, NGT placed in the ED. Cardiology consulted for cardiac arrest and afib.    #Ischemia   - No clear evidence of acute ischemia  - Troponin 32.7, continue to trend cardiac enzymes  - EKG with no ischemic changes  - Hx of CAD: Holding ASA, resume when able  - Serial EKGs, Continue to monitor for signs of ischemia     #Afib, Cardiac arrest  - Patient now in ICU s/p cardiac arrest x2, CPR with ROSC currently intubated and sedated   - EKG on admission: Junctional bradycardia Rate 35bpm, with PSVCs and with occasional PVCs. RAD. Incomplete RBB  - Likely iatrogenic in setting of receiving double dose of metoprolol and cardizem and potential anaphylaxis to ertapenem   - Pressor support w/ levophed and epinephrine goal MAP > 60, SBP >90  - Recommend altering vasopressor support or sedation agent to not cause further tachycardia  - Holding BB/cardizem can resume BB/cardizem as BPs tolerate  - Since pt w/ ileus recommend transitioning to IV metoprolol 2.5mg q6h and cardizem drip 5mg/Hr @ 5mL/hr  - Holding eliquis for now, resume when able  - Continuous cardiac monitoring for arrhythmias  - Serial EKGs    #HTN  - Cardiogenic vs distributive shock 2/2 BB/cardizem overdose vs anaphylactic reaction to ertapenem vs sepsis?, now on dual pressors  - Pressor support w/ levophed and epinephrine goal MAP > 60, SBP >90  - Hold all BP medications until hemodynamically stable with de-escalation of vasopressors  - Recommend altering vasopressor support to agent with unopposed alpha-1 mediated vasoconstriction as to not cause further tachycardia ie neosynepherine with unopposed alpha-1 mediated vasoconstriction  - Continue to monitor hemodynamics     #Volume Status  - TTE: 3/18/24 Technically difficult ECHO w/ normal LV size and function EF 50-55%, mildly reduced RV systolic function, mod dilated LA and RA, mod to sev MR, mild to mod TR, PASP 36  - Does not appear clinically volume overloaded on examination      - Other cardiovascular workup will depend on clinical course.  - All other workup per ICU  - Will continue to follow.     52 YO M with past medical history of  AFib (on Eliquis), indwelling Aguilera, cerebral aneurysm, CAD (s/p stents), CVA, T2DM, HTN, OM (s/p debridement), PE, perforated gastric ulcer s/p ometnopexy 2019 with Dr. Mejia, transferred from [facility] for difficulty in breathing.  His recent admission was for osteomyelitis and discharged on IV Vancomycin, admitted s/p cardiac arrest x2 w/ AHRF admitted to the ICU currently intubated, sedated, on levophed and epinephrine, NGT placed in the ED. Cardiology consulted for cardiac arrest and afib.    #Ischemia   - No clear evidence of acute ischemia  - Troponin 32.7, no need to further trend cardiac enzymes  - EKG with no ischemic changes  - Hx of CAD: Holding ASA, resume when able  -statin when able  - Serial EKGs, Continue to monitor for signs of ischemia     #Afib, Cardiac arrest  - Patient now in ICU s/p cardiac arrest x2, CPR with ROSC currently intubated and sedated   - EKG on admission: Junctional bradycardia Rate 35bpm, with PSVCs and with occasional PVCs. RAD. Incomplete RBB  - Likely iatrogenic in setting of receiving double dose of metoprolol and cardizem and potential anaphylaxis to ertapenem   - Pressor support w/ levophed and epinephrine goal MAP > 60, SBP >90  - Recommend that we consider altering vasopressor support or sedation agent to not aggravate tachycardia  - Holding BB/cardizem can resume BB/cardizem as BPs tolerate  - Since pt w/ ileus recommend transitioning to IV metoprolol 2.5mg q6h and cardizem drip 5mg/Hr @ 5mL/hr, when nodals can be resumed  -can consider dig or amio for rate control which avoids bp drop, though with jacob dig not ideal either  - Holding eliquis for now, resume when able  - Continuous cardiac monitoring for arrhythmias  - Serial EKGs    #HTN  - Cardiogenic vs distributive shock 2/2 BB/cardizem overdose vs anaphylactic reaction to ertapenem vs sepsis?, now on dual pressors  - Pressor support w/ levophed and epinephrine goal MAP > 60, SBP >90  - Hold all BP medications until hemodynamically stable with de-escalation of vasopressors  - Recommend altering vasopressor support to agent with unopposed alpha-1 mediated vasoconstriction as to not cause further tachycardia ie neosynepherine with unopposed alpha-1 mediated vasoconstriction  - Continue to monitor hemodynamics     #Volume Status  - TTE: 3/18/24 Technically difficult ECHO w/ normal LV size and function EF 50-55%, mildly reduced RV systolic function, mod dilated LA and RA, mod to sev MR, mild to mod TR, PASP 36  - Does not appear clinically volume overloaded on examination      - Other cardiovascular workup will depend on clinical course.  - All other workup per ICU  - Will continue to follow.

## 2024-06-14 NOTE — CONSULT NOTE ADULT - SUBJECTIVE AND OBJECTIVE BOX
[facility]         Bolivar Carbajal, El, Brittany, Zahra, Reynaldo, Román        [phone] (office)    Reason for Consult:    Interval HPI: Patient seen and examined at bedside. Unable to obtain interval HPI/ROS as pt sedated/intubated. Pt in afib rates 120s on monitor.    HPI:  Known from rehab  Claude Villareal is a 52 YO M with past medical history of  AF on Eliquis, indwelling Aguilera, CAD s/p stents, CVA, DMT 2, Hypertension, osteomyelitis s/p debridement, PE, transferred from [facility] for difficulty in breathing.  His recent admission was for osteomyelitis and discharged on IV Vancomycin. On ED arrival he is unresponsive, apneic, no palpable pulse or blood pressure, EKG rhythm HR < 20/min W/O P waves. Patient intubated, Code ACLS activated, IV epinephrine, IV atropine administered, pulse became palpable, still Hypotensive, IV levophed administered. Admitted to ICU consulted,  Patient woke up and agitated try to pull out ETT,  IV propofol started,  Patient had no nausea or vomiting in ELIAS.   (13 Jun 2024 23:25)      PAST MEDICAL & SURGICAL HISTORY:  Diabetes      Diabetes mellitus with no complication      Afib      Hypertension      BPH (benign prostatic hyperplasia)      Perforated gastric ulcer  s/p emergent ex-lap omentopexy and plication 6/2019      Pulmonary embolism      History of non-ST elevation myocardial infarction (NSTEMI)      Osteomyelitis  s/p debridement      CAD S/P percutaneous coronary angioplasty      Cerebrovascular accident      H/O abdominal surgery      Perforated gastric ulcer      Traumatic amputation of left foot, initial encounter          SOCIAL HISTORY: No active tobacco, alcohol or illicit drug use    FAMILY HISTORY:  FH: pulmonary embolism  Mother    FH: coronary artery disease  Father    FH: stroke  Father        Home Medications:  acetaminophen 325 mg oral tablet: 2 tab(s) orally every 6 hours As needed Temp greater or equal to 38C (100.4F), Mild Pain (1 - 3) (07 Jun 2024 20:10)  Allegra 60 mg oral tablet: 1 tab(s) orally once a day (28 May 2024 13:50)  apixaban 5 mg oral tablet: 1 tab(s) orally 2 times a day (28 May 2024 13:50)  ascorbic acid 1000 mg oral tablet: 1 tab(s) orally once a day (28 May 2024 13:50)  aspirin 81 mg oral tablet, chewable: 1 tab(s) chewed once a day (28 May 2024 13:50)  atorvastatin 40 mg oral tablet: 1 tab(s) orally once a day (28 May 2024 13:50)  cholecalciferol 1250 mcg (50,000 intl units) oral capsule: 1 cap(s) orally once a month on 15th (28 May 2024 13:50)  cranberry oral capsule: 450 milligram(s) orally once a day (28 May 2024 13:50)  Deep Sea Nasal 0.65% nasal spray: 2 spray(s) in each nostril once a day (28 May 2024 13:50)  DilTIAZem (Eqv-Cardizem CD) 360 mg/24 hours oral capsule, extended release: 1 cap(s) orally once a day (28 May 2024 13:50)  diphenhydrAMINE 25 mg oral capsule: 1 cap(s) orally every 4 hours As needed Rash and/or Itching (07 Jun 2024 20:10)  docusate sodium 100 mg oral capsule: 3 cap(s) orally once a day (28 May 2024 13:50)  ertapenem 1 g injection: 1 gram(s) injectable once a day (07 Jun 2024 20:10)  famotidine 20 mg oral tablet: 1 tab(s) orally once a day (28 May 2024 13:50)  ferrous sulfate 325 mg (65 mg elemental iron) oral tablet: 1 tab(s) orally 2 times a day (28 May 2024 13:50)  gabapentin 300 mg oral capsule: 1 cap(s) orally every 12 hours (28 May 2024 13:50)  HumuLIN R 100 units/mL injectable solution: sliding scale (28 May 2024 13:50)  insulin glargine 100 units/mL subcutaneous solution: 15 unit(s) subcutaneous once a day 09:00am (28 May 2024 13:50)  insulin glargine 100 units/mL subcutaneous solution: 7 unit(s) subcutaneous once a day (at bedtime) 21:00pm (28 May 2024 13:50)  levocetirizine 5 mg oral tablet: 1 tab(s) orally once a day (at bedtime) (28 May 2024 13:50)  Medrol Dosepak 4 mg oral tablet: 1 packet(s) orally once take as directed on packet (13 Jun 2024 09:36)  melatonin 5 mg oral tablet: 1 tab(s) orally once a day (at bedtime) (28 May 2024 13:50)  metFORMIN 1000 mg oral tablet: 1 tab(s) orally 2 times a day (28 May 2024 13:50)  metoclopramide 5 mg oral tablet: 1 tab(s) orally 3 times a day (28 May 2024 13:50)  metoprolol succinate 100 mg oral tablet, extended release: 1 tab(s) orally once a day (07 Jun 2024 20:10)  Multiple Vitamins with Minerals oral tablet: 1 tab(s) orally once a day (28 May 2024 13:50)  naloxegol 25 mg oral tablet: 1 tab(s) orally once a day (28 May 2024 13:50)  ondansetron 2 mg/mL injectable solution: 4 milligram(s) intravenously every 8 hours as needed for  nausea (28 May 2024 13:50)  oxyBUTYnin 10 mg/24 hr oral tablet, extended release: 1 tab(s) orally once a day (28 May 2024 13:50)  oxyCODONE 10 mg oral tablet: 1 tab(s) orally 2 times a day (28 May 2024 13:50)  pantoprazole 40 mg oral delayed release tablet: 1 tab(s) orally once a day (28 May 2024 13:50)  polyethylene glycol 3350 oral powder for reconstitution: 17 gram(s) orally 2 times a day (28 May 2024 13:50)  Potassium Chloride (Eqv-Klor-Con M20) 20 mEq oral tablet, extended release: 1 tab(s) orally 2 times a day (28 May 2024 13:50)  Pyridium 100 mg oral tablet: 1 tab(s) orally 3 times a day as needed (28 May 2024 13:50)  senna (sennosides) 8.6 mg oral tablet: 2 tab(s) orally once a day (at bedtime) (28 May 2024 13:50)  sucralfate 1 g oral tablet: 1 tab(s) orally once a day (28 May 2024 13:50)  tiotropium 2.5 mcg/inh inhalation aerosol: 2 puff(s) inhaled once a day (28 May 2024 13:50)  torsemide 20 mg oral tablet: 1 tab(s) orally 2 times a day (28 May 2024 13:50)  triamcinolone 0.025% topical cream: Apply topically to affected area 2 times a day (28 May 2024 13:50)  vancomycin 750 mg intravenous injection: 750 milligram(s) intravenous every 12 hours (11 Jun 2024 21:55)      MEDICATIONS  (STANDING):  chlorhexidine 0.12% Liquid 15 milliLiter(s) Oral Mucosa every 12 hours  chlorhexidine 4% Liquid 1 Application(s) Topical <User Schedule>  dextrose 10% Bolus 125 milliLiter(s) IV Bolus once  dextrose 5%. 1000 milliLiter(s) (100 mL/Hr) IV Continuous <Continuous>  dextrose 5%. 1000 milliLiter(s) (50 mL/Hr) IV Continuous <Continuous>  dextrose 50% Injectable 25 Gram(s) IV Push once  dextrose 50% Injectable 12.5 Gram(s) IV Push once  EPINEPHrine    Infusion 0.05 MICROgram(s)/kG/Min (15.3 mL/Hr) IV Continuous <Continuous>  glucagon  Injectable 1 milliGRAM(s) IntraMuscular once  insulin regular Infusion 10 Unit(s)/Hr (10 mL/Hr) IV Continuous <Continuous>  lactated ringers. 1000 milliLiter(s) (100 mL/Hr) IV Continuous <Continuous>  norepinephrine Infusion 0.05 MICROgram(s)/kG/Min (9.5 mL/Hr) IV Continuous <Continuous>  pantoprazole  Injectable 40 milliGRAM(s) IV Push daily  propofol Infusion 5 MICROgram(s)/kG/Min (2.45 mL/Hr) IV Continuous <Continuous>  sodium bicarbonate  Infusion 0.074 mEq/kG/Hr (50 mL/Hr) IV Continuous <Continuous>    MEDICATIONS  (PRN):  dextrose Oral Gel 15 Gram(s) Oral once PRN Blood Glucose LESS THAN 70 milliGRAM(s)/deciliter  sodium chloride 0.9% lock flush 10 milliLiter(s) IV Push every 1 hour PRN Pre/post blood products, medications, blood draw, and to maintain line patency      Allergies    ertapenem (Blisters; Rash)  fish (Hives)    Intolerances        REVIEW OF SYSTEMS: Unable to assess    VITAL SIGNS:   Vital Signs Last 24 Hrs  T(C): 37.9 (14 Jun 2024 07:59), Max: 37.9 (14 Jun 2024 04:39)  T(F): 100.2 (14 Jun 2024 07:59), Max: 100.2 (14 Jun 2024 04:39)  HR: 111 (14 Jun 2024 08:30) (51 - 111)  BP: 101/59 (14 Jun 2024 08:00) (79/52 - 120/85)  BP(mean): 72 (14 Jun 2024 08:00) (61 - 95)  RR: 15 (14 Jun 2024 08:00) (14 - 22)  SpO2: 100% (14 Jun 2024 08:30) (92% - 100%)    Parameters below as of 14 Jun 2024 01:00  Patient On (Oxygen Delivery Method): ventilator    O2 Concentration (%): 60    I&O's Summary      PHYSICAL EXAM:  Constitutional: Acutely ill appearing, sedated and intubated  HEENT NC/AT, +NGT w/ dark output  Pulmonary: _+Coarse breath sounds b/l.   Cardiovascular: +tachycardia, irregularly irregular. +S1, S2, no murmur  Gastrointestinal: Soft, mildly distended  Extremities: +s/p foot amputation  Neurological: +sedated, PERRLA  Skin: +scattered lesions and abrasions diffusely, +dry cracking skin b/l LE, +erythema in b/l LE  Psych: Mood & affect appropriate    LABS: All Labs Reviewed:                        13.7   34.23 )-----------( 446      ( 14 Jun 2024 04:30 )             45.0                         13.6   23.52 )-----------( 320      ( 13 Jun 2024 22:55 )             46.4                         12.6   12.49 )-----------( 307      ( 11 Jun 2024 14:25 )             37.9     14 Jun 2024 04:30    140    |  103    |  47     ----------------------------<  469    3.8     |  14     |  3.10   13 Jun 2024 23:55    143    |  116    |  32     ----------------------------<  355    4.3     |  11     |  1.70     Ca    8.4        14 Jun 2024 04:30  Ca    6.1        13 Jun 2024 23:55  Phos  2.8       14 Jun 2024 04:30  Mg     2.2       14 Jun 2024 04:30  Mg     1.4       13 Jun 2024 23:55    TPro  6.8    /  Alb  2.7    /  TBili  0.7    /  DBili  x      /  AST  385    /  ALT  435    /  AlkPhos  152    14 Jun 2024 04:30  TPro  5.0    /  Alb  1.8    /  TBili  0.6    /  DBili  x      /  AST  207    /  ALT  203    /  AlkPhos  99     13 Jun 2024 23:55    PT/INR - ( 14 Jun 2024 04:30 )   PT: 26.6 sec;   INR: 2.33 ratio         PTT - ( 14 Jun 2024 04:30 )  PTT:42.6 sec  CARDIAC MARKERS ( 13 Jun 2024 23:55 )  x     / x     / x     / x     / <1.0 ng/mL      Blood Culture:     06-13 @ 23:55  TSH: 7.84      EKG: Junctional bradycardia Rate 35bpm, with premature supraventricular complexes and with occasional premature ventricular complexes. Right axis deviation. Incomplete right bundle branch block    RADIOLOGY:  CT Chest No Cont (06.14.24 @ 00:51) >  Preliminary  impression: Motion degraded exam. Emphysema. Mildly dilated loops of   small bowel with gradual transition, most likely reflecting ileus.   Early/developing obstruction not fully excluded.    CT Head No Cont (06.14.24 @ 00:44) >  FINDINGS:  Endotracheal and enteric tubes are partially visualized.    There are embolic coil masses in the right parasellar region and in the   region of the right middle cerebral artery bifurcation, compatible with   prior endovascular treatment of intracranial aneurysms.  There is a   pipeline stent in the proximal right middle cerebral artery.    There is no CT evidence of acute intracranial hemorrhage, mass effect or   midline shift.  No acute loss of gray matter-white matter differentiation   is identified.    An empty versus partially empty sella is incidentally noted.    Prominence of the ventricles and sulci is compatible with mild   age-related involutional changes..    The paranasal sinuses and mastoids are grossly clear.    The calvarium, skull base and orbits appear unremarkable.    There is poor dentition with numerous large dental caries throughout the   upper teeth.    IMPRESSION:  No CT evidence of acute intracranial pathology.    Please note that CT scan is insensitive for early acute ischemia.  Repeat   CT in 12-24 hours or follow-up MRI is recommended for further evaluation.    Poor dentition with numerous large dentalcaries throughout the upper   teeth.         [facility]         Bolivar Carbajal, El, Brittany, Zahra, Reynaldo, Román        [phone] (office)    Reason for Consult: Afib, cardiac arrest    Interval HPI: Patient seen and examined at bedside. Unable to obtain interval HPI/ROS as pt sedated/intubated. Pt in afib rates 120-140s on monitor. Of note, pt received a double dose of his metoprolol and cardizem in the PM, as well as ertapenem though the pt likely has an allergy.    HPI:  Known from rehab  Claude Villareal is a 54 YO M with past medical history of  AF on Eliquis, indwelling Aguilera, CAD s/p stents, CVA, DMT 2, Hypertension, osteomyelitis s/p debridement, PE, transferred from [facility] for difficulty in breathing.  His recent admission was for osteomyelitis and discharged on IV Vancomycin. On ED arrival he is unresponsive, apneic, no palpable pulse or blood pressure, EKG rhythm HR < 20/min W/O P waves. Patient intubated, Code ACLS activated, IV epinephrine, IV atropine administered, pulse became palpable, still Hypotensive, IV levophed administered. Admitted to ICU consulted,  Patient woke up and agitated try to pull out ETT,  IV propofol started,  Patient had no nausea or vomiting in ELIAS.   (13 Jun 2024 23:25)      PAST MEDICAL & SURGICAL HISTORY:  Diabetes      Diabetes mellitus with no complication      Afib      Hypertension      BPH (benign prostatic hyperplasia)      Perforated gastric ulcer  s/p emergent ex-lap omentopexy and plication 6/2019      Pulmonary embolism      History of non-ST elevation myocardial infarction (NSTEMI)      Osteomyelitis  s/p debridement      CAD S/P percutaneous coronary angioplasty      Cerebrovascular accident      H/O abdominal surgery      Perforated gastric ulcer      Traumatic amputation of left foot, initial encounter          SOCIAL HISTORY: No active tobacco, alcohol or illicit drug use    FAMILY HISTORY:  FH: pulmonary embolism  Mother    FH: coronary artery disease  Father    FH: stroke  Father        Home Medications:  acetaminophen 325 mg oral tablet: 2 tab(s) orally every 6 hours As needed Temp greater or equal to 38C (100.4F), Mild Pain (1 - 3) (07 Jun 2024 20:10)  Allegra 60 mg oral tablet: 1 tab(s) orally once a day (28 May 2024 13:50)  apixaban 5 mg oral tablet: 1 tab(s) orally 2 times a day (28 May 2024 13:50)  ascorbic acid 1000 mg oral tablet: 1 tab(s) orally once a day (28 May 2024 13:50)  aspirin 81 mg oral tablet, chewable: 1 tab(s) chewed once a day (28 May 2024 13:50)  atorvastatin 40 mg oral tablet: 1 tab(s) orally once a day (28 May 2024 13:50)  cholecalciferol 1250 mcg (50,000 intl units) oral capsule: 1 cap(s) orally once a month on 15th (28 May 2024 13:50)  cranberry oral capsule: 450 milligram(s) orally once a day (28 May 2024 13:50)  Deep Sea Nasal 0.65% nasal spray: 2 spray(s) in each nostril once a day (28 May 2024 13:50)  DilTIAZem (Eqv-Cardizem CD) 360 mg/24 hours oral capsule, extended release: 1 cap(s) orally once a day (28 May 2024 13:50)  diphenhydrAMINE 25 mg oral capsule: 1 cap(s) orally every 4 hours As needed Rash and/or Itching (07 Jun 2024 20:10)  docusate sodium 100 mg oral capsule: 3 cap(s) orally once a day (28 May 2024 13:50)  ertapenem 1 g injection: 1 gram(s) injectable once a day (07 Jun 2024 20:10)  famotidine 20 mg oral tablet: 1 tab(s) orally once a day (28 May 2024 13:50)  ferrous sulfate 325 mg (65 mg elemental iron) oral tablet: 1 tab(s) orally 2 times a day (28 May 2024 13:50)  gabapentin 300 mg oral capsule: 1 cap(s) orally every 12 hours (28 May 2024 13:50)  HumuLIN R 100 units/mL injectable solution: sliding scale (28 May 2024 13:50)  insulin glargine 100 units/mL subcutaneous solution: 15 unit(s) subcutaneous once a day 09:00am (28 May 2024 13:50)  insulin glargine 100 units/mL subcutaneous solution: 7 unit(s) subcutaneous once a day (at bedtime) 21:00pm (28 May 2024 13:50)  levocetirizine 5 mg oral tablet: 1 tab(s) orally once a day (at bedtime) (28 May 2024 13:50)  Medrol Dosepak 4 mg oral tablet: 1 packet(s) orally once take as directed on packet (13 Jun 2024 09:36)  melatonin 5 mg oral tablet: 1 tab(s) orally once a day (at bedtime) (28 May 2024 13:50)  metFORMIN 1000 mg oral tablet: 1 tab(s) orally 2 times a day (28 May 2024 13:50)  metoclopramide 5 mg oral tablet: 1 tab(s) orally 3 times a day (28 May 2024 13:50)  metoprolol succinate 100 mg oral tablet, extended release: 1 tab(s) orally once a day (07 Jun 2024 20:10)  Multiple Vitamins with Minerals oral tablet: 1 tab(s) orally once a day (28 May 2024 13:50)  naloxegol 25 mg oral tablet: 1 tab(s) orally once a day (28 May 2024 13:50)  ondansetron 2 mg/mL injectable solution: 4 milligram(s) intravenously every 8 hours as needed for  nausea (28 May 2024 13:50)  oxyBUTYnin 10 mg/24 hr oral tablet, extended release: 1 tab(s) orally once a day (28 May 2024 13:50)  oxyCODONE 10 mg oral tablet: 1 tab(s) orally 2 times a day (28 May 2024 13:50)  pantoprazole 40 mg oral delayed release tablet: 1 tab(s) orally once a day (28 May 2024 13:50)  polyethylene glycol 3350 oral powder for reconstitution: 17 gram(s) orally 2 times a day (28 May 2024 13:50)  Potassium Chloride (Eqv-Klor-Con M20) 20 mEq oral tablet, extended release: 1 tab(s) orally 2 times a day (28 May 2024 13:50)  Pyridium 100 mg oral tablet: 1 tab(s) orally 3 times a day as needed (28 May 2024 13:50)  senna (sennosides) 8.6 mg oral tablet: 2 tab(s) orally once a day (at bedtime) (28 May 2024 13:50)  sucralfate 1 g oral tablet: 1 tab(s) orally once a day (28 May 2024 13:50)  tiotropium 2.5 mcg/inh inhalation aerosol: 2 puff(s) inhaled once a day (28 May 2024 13:50)  torsemide 20 mg oral tablet: 1 tab(s) orally 2 times a day (28 May 2024 13:50)  triamcinolone 0.025% topical cream: Apply topically to affected area 2 times a day (28 May 2024 13:50)  vancomycin 750 mg intravenous injection: 750 milligram(s) intravenous every 12 hours (11 Jun 2024 21:55)      MEDICATIONS  (STANDING):  chlorhexidine 0.12% Liquid 15 milliLiter(s) Oral Mucosa every 12 hours  chlorhexidine 4% Liquid 1 Application(s) Topical <User Schedule>  dextrose 10% Bolus 125 milliLiter(s) IV Bolus once  dextrose 5%. 1000 milliLiter(s) (100 mL/Hr) IV Continuous <Continuous>  dextrose 5%. 1000 milliLiter(s) (50 mL/Hr) IV Continuous <Continuous>  dextrose 50% Injectable 25 Gram(s) IV Push once  dextrose 50% Injectable 12.5 Gram(s) IV Push once  EPINEPHrine    Infusion 0.05 MICROgram(s)/kG/Min (15.3 mL/Hr) IV Continuous <Continuous>  glucagon  Injectable 1 milliGRAM(s) IntraMuscular once  insulin regular Infusion 10 Unit(s)/Hr (10 mL/Hr) IV Continuous <Continuous>  lactated ringers. 1000 milliLiter(s) (100 mL/Hr) IV Continuous <Continuous>  norepinephrine Infusion 0.05 MICROgram(s)/kG/Min (9.5 mL/Hr) IV Continuous <Continuous>  pantoprazole  Injectable 40 milliGRAM(s) IV Push daily  propofol Infusion 5 MICROgram(s)/kG/Min (2.45 mL/Hr) IV Continuous <Continuous>  sodium bicarbonate  Infusion 0.074 mEq/kG/Hr (50 mL/Hr) IV Continuous <Continuous>    MEDICATIONS  (PRN):  dextrose Oral Gel 15 Gram(s) Oral once PRN Blood Glucose LESS THAN 70 milliGRAM(s)/deciliter  sodium chloride 0.9% lock flush 10 milliLiter(s) IV Push every 1 hour PRN Pre/post blood products, medications, blood draw, and to maintain line patency      Allergies    ertapenem (Blisters; Rash)  fish (Hives)    Intolerances        REVIEW OF SYSTEMS: Unable to assess    VITAL SIGNS:   Vital Signs Last 24 Hrs  T(C): 37.9 (14 Jun 2024 07:59), Max: 37.9 (14 Jun 2024 04:39)  T(F): 100.2 (14 Jun 2024 07:59), Max: 100.2 (14 Jun 2024 04:39)  HR: 111 (14 Jun 2024 08:30) (51 - 111)  BP: 101/59 (14 Jun 2024 08:00) (79/52 - 120/85)  BP(mean): 72 (14 Jun 2024 08:00) (61 - 95)  RR: 15 (14 Jun 2024 08:00) (14 - 22)  SpO2: 100% (14 Jun 2024 08:30) (92% - 100%)    Parameters below as of 14 Jun 2024 01:00  Patient On (Oxygen Delivery Method): ventilator    O2 Concentration (%): 60    I&O's Summary      PHYSICAL EXAM:  Constitutional: Acutely ill appearing, sedated and intubated  HEENT NC/AT, +NGT w/ dark output  Pulmonary: _+Coarse breath sounds b/l.   Cardiovascular: +tachycardia, irregularly irregular. +S1, S2, no murmur  Gastrointestinal: Soft, mildly distended  Extremities: +s/p foot amputation  Neurological: +sedated, PERRLA  Skin: +scattered lesions and abrasions diffusely, +dry cracking skin b/l LE, +erythema in b/l LE  Psych: Mood & affect appropriate    LABS: All Labs Reviewed:                        13.7   34.23 )-----------( 446      ( 14 Jun 2024 04:30 )             45.0                         13.6   23.52 )-----------( 320      ( 13 Jun 2024 22:55 )             46.4                         12.6   12.49 )-----------( 307      ( 11 Jun 2024 14:25 )             37.9     14 Jun 2024 04:30    140    |  103    |  47     ----------------------------<  469    3.8     |  14     |  3.10   13 Jun 2024 23:55    143    |  116    |  32     ----------------------------<  355    4.3     |  11     |  1.70     Ca    8.4        14 Jun 2024 04:30  Ca    6.1        13 Jun 2024 23:55  Phos  2.8       14 Jun 2024 04:30  Mg     2.2       14 Jun 2024 04:30  Mg     1.4       13 Jun 2024 23:55    TPro  6.8    /  Alb  2.7    /  TBili  0.7    /  DBili  x      /  AST  385    /  ALT  435    /  AlkPhos  152    14 Jun 2024 04:30  TPro  5.0    /  Alb  1.8    /  TBili  0.6    /  DBili  x      /  AST  207    /  ALT  203    /  AlkPhos  99     13 Jun 2024 23:55    PT/INR - ( 14 Jun 2024 04:30 )   PT: 26.6 sec;   INR: 2.33 ratio         PTT - ( 14 Jun 2024 04:30 )  PTT:42.6 sec  CARDIAC MARKERS ( 13 Jun 2024 23:55 )  x     / x     / x     / x     / <1.0 ng/mL      Blood Culture:     06-13 @ 23:55  TSH: 7.84      EKG: Junctional bradycardia Rate 35bpm, with premature supraventricular complexes and with occasional premature ventricular complexes. Right axis deviation. Incomplete right bundle branch block    RADIOLOGY:  CT Chest No Cont (06.14.24 @ 00:51) >  Preliminary  impression: Motion degraded exam. Emphysema. Mildly dilated loops of   small bowel with gradual transition, most likely reflecting ileus.   Early/developing obstruction not fully excluded.    CT Head No Cont (06.14.24 @ 00:44) >  FINDINGS:  Endotracheal and enteric tubes are partially visualized.    There are embolic coil masses in the right parasellar region and in the   region of the right middle cerebral artery bifurcation, compatible with   prior endovascular treatment of intracranial aneurysms.  There is a   pipeline stent in the proximal right middle cerebral artery.    There is no CT evidence of acute intracranial hemorrhage, mass effect or   midline shift.  No acute loss of gray matter-white matter differentiation   is identified.    An empty versus partially empty sella is incidentally noted.    Prominence of the ventricles and sulci is compatible with mild   age-related involutional changes..    The paranasal sinuses and mastoids are grossly clear.    The calvarium, skull base and orbits appear unremarkable.    There is poor dentition with numerous large dental caries throughout the   upper teeth.    IMPRESSION:  No CT evidence of acute intracranial pathology.    Please note that CT scan is insensitive for early acute ischemia.  Repeat   CT in 12-24 hours or follow-up MRI is recommended for further evaluation.    Poor dentition with numerous large dentalcaries throughout the upper   teeth.

## 2024-06-14 NOTE — PATIENT PROFILE ADULT - [id number]_GEN_A_NUR
cardiovascular/diabetes/genitourinary cardiovascular/diabetes/genitourinary/musculoskeletal/respiratory

## 2024-06-14 NOTE — CONSULT NOTE ADULT - CRITICAL CARE ATTENDING COMMENT
54 YO M with past medical history of  AFib (on Eliquis), indwelling Aguilera, cerebral aneurysm, CAD (s/p stents), CVA, T2DM, HTN, OM (s/p debridement), PE, perforated gastric ulcer s/p ometnopexy 2019 with Dr. Mejia, transferred from [facility] for difficulty in breathing.  His recent admission was for osteomyelitis and discharged on IV Vancomycin, admitted s/p cardiac arrest x2 w/ AHRF admitted to the ICU currently intubated, sedated, on levophed and epinephrine, NGT placed in the ED. Cardiology consulted for cardiac arrest and afib.     - No clear evidence of acute ischemia  -asa and statin when able  -arrest and johnny issues likely iatrogenic from double dosing of avn blockers  -hr now increasing, prob aggravated by pressors, but meds wearing off  -if possible transition to piero  -when able would resume nodals step wise, with approx half doses of bb and ccb in divided doses iv, noting apparent ileus  -equiv would roughly be dilt ivp 5 and gtt at 5, and metop 2.5 iv q6  -consider dig or amio for rate if needed  -ac when able  -normal ef and mod-sev mr on echo, no need to repeat now     Upon my evaluation, this patient is at high risk for imminent or life threatening deterioration due to resp failure, hypotension,  and other active medical issues which require my direct attention, intervention, and personal management.  I have personally spent >65 minutes  of critical care time exclusive of time spent on separate billing procedures. This includes review of laboratory data, radiology results, discussion with primary team\patient, and monitoring for potential decompensation Interventions were performed as documented above.

## 2024-06-14 NOTE — DISCHARGE NOTE PROVIDER - CARE PROVIDER_API CALL
Grisel Murray  Internal Medicine  [address] [place] [address]  Phone: [phone]  Fax: [phone]  Follow Up Time:     Claudette Jacobs  Cardiology  [address] [place] [address]  Phone: [phone]  Fax: [phone]  Follow Up Time:

## 2024-06-14 NOTE — CONSULT NOTE ADULT - SUBJECTIVE AND OBJECTIVE BOX
Patient is a 53y old  Male who presents with a chief complaint of AHRF (14 Jun 2024 07:16)    HPI:  Known from rehab  Claude Villareal is a 52 YO M with past medical history of  AF on Eliquis, indwelling Aguilera, CAD s/p stents, CVA, DMT 2, Hypertension, osteomyelitis s/p debridement, PE, transferred from [facility] for difficulty in breathing.  His recent admission was for osteomyelitis and discharged on IV Vancomycin. On ED arrival he is unresponsive, apneic, no palpable pulse or blood pressure, EKG rhythm HR < 20/min W/O P waves. Patient intubated, Code ACLS activated, IV epinephrine, IV atropine administered, pulse became palpable, still Hypotensive, IV levophed administered. Admitted to ICU consulted,  Patient woke up and agitated try to pull out ETT,  IV propofol started,  Patient had no nausea or vomiting in ELIAS.   (13 Jun 2024 23:25)      PAST MEDICAL HISTORY:  No pertinent past medical history    Diabetes    No pertinent past medical history    Diabetes mellitus with no complication    Afib    Hypertension    BPH (benign prostatic hyperplasia)    Perforated gastric ulcer    Pulmonary embolism    History of non-ST elevation myocardial infarction (NSTEMI)    Osteomyelitis    CAD S/P percutaneous coronary angioplasty    Cerebrovascular accident        PAST SURGICAL HISTORY:  No significant past surgical history    No significant past surgical history    H/O abdominal surgery    Perforated gastric ulcer    Traumatic amputation of left foot, initial encounter        FAMILY HISTORY:  FH: pulmonary embolism  Mother    FH: coronary artery disease  Father    FH: stroke  Father        SOCIAL HISTORY:    Allergies    ertapenem (Blisters; Rash)  fish (Hives)    Intolerances      Home Medications:  acetaminophen 325 mg oral tablet: 2 tab(s) orally every 6 hours As needed Temp greater or equal to 38C (100.4F), Mild Pain (1 - 3) (07 Jun 2024 20:10)  Allegra 60 mg oral tablet: 1 tab(s) orally once a day (28 May 2024 13:50)  apixaban 5 mg oral tablet: 1 tab(s) orally 2 times a day (28 May 2024 13:50)  ascorbic acid 1000 mg oral tablet: 1 tab(s) orally once a day (28 May 2024 13:50)  aspirin 81 mg oral tablet, chewable: 1 tab(s) chewed once a day (28 May 2024 13:50)  atorvastatin 40 mg oral tablet: 1 tab(s) orally once a day (28 May 2024 13:50)  cholecalciferol 1250 mcg (50,000 intl units) oral capsule: 1 cap(s) orally once a month on 15th (28 May 2024 13:50)  cranberry oral capsule: 450 milligram(s) orally once a day (28 May 2024 13:50)  Deep Sea Nasal 0.65% nasal spray: 2 spray(s) in each nostril once a day (28 May 2024 13:50)  DilTIAZem (Eqv-Cardizem CD) 360 mg/24 hours oral capsule, extended release: 1 cap(s) orally once a day (28 May 2024 13:50)  diphenhydrAMINE 25 mg oral capsule: 1 cap(s) orally every 4 hours As needed Rash and/or Itching (07 Jun 2024 20:10)  docusate sodium 100 mg oral capsule: 3 cap(s) orally once a day (28 May 2024 13:50)  ertapenem 1 g injection: 1 gram(s) injectable once a day (07 Jun 2024 20:10)  famotidine 20 mg oral tablet: 1 tab(s) orally once a day (28 May 2024 13:50)  ferrous sulfate 325 mg (65 mg elemental iron) oral tablet: 1 tab(s) orally 2 times a day (28 May 2024 13:50)  gabapentin 300 mg oral capsule: 1 cap(s) orally every 12 hours (28 May 2024 13:50)  HumuLIN R 100 units/mL injectable solution: sliding scale (28 May 2024 13:50)  insulin glargine 100 units/mL subcutaneous solution: 15 unit(s) subcutaneous once a day 09:00am (28 May 2024 13:50)  insulin glargine 100 units/mL subcutaneous solution: 7 unit(s) subcutaneous once a day (at bedtime) 21:00pm (28 May 2024 13:50)  levocetirizine 5 mg oral tablet: 1 tab(s) orally once a day (at bedtime) (28 May 2024 13:50)  Medrol Dosepak 4 mg oral tablet: 1 packet(s) orally once take as directed on packet (13 Jun 2024 09:36)  melatonin 5 mg oral tablet: 1 tab(s) orally once a day (at bedtime) (28 May 2024 13:50)  metFORMIN 1000 mg oral tablet: 1 tab(s) orally 2 times a day (28 May 2024 13:50)  metoclopramide 5 mg oral tablet: 1 tab(s) orally 3 times a day (28 May 2024 13:50)  metoprolol succinate 100 mg oral tablet, extended release: 1 tab(s) orally once a day (07 Jun 2024 20:10)  Multiple Vitamins with Minerals oral tablet: 1 tab(s) orally once a day (28 May 2024 13:50)  naloxegol 25 mg oral tablet: 1 tab(s) orally once a day (28 May 2024 13:50)  ondansetron 2 mg/mL injectable solution: 4 milligram(s) intravenously every 8 hours as needed for  nausea (28 May 2024 13:50)  oxyBUTYnin 10 mg/24 hr oral tablet, extended release: 1 tab(s) orally once a day (28 May 2024 13:50)  oxyCODONE 10 mg oral tablet: 1 tab(s) orally 2 times a day (28 May 2024 13:50)  pantoprazole 40 mg oral delayed release tablet: 1 tab(s) orally once a day (28 May 2024 13:50)  polyethylene glycol 3350 oral powder for reconstitution: 17 gram(s) orally 2 times a day (28 May 2024 13:50)  Potassium Chloride (Eqv-Klor-Con M20) 20 mEq oral tablet, extended release: 1 tab(s) orally 2 times a day (28 May 2024 13:50)  Pyridium 100 mg oral tablet: 1 tab(s) orally 3 times a day as needed (28 May 2024 13:50)  senna (sennosides) 8.6 mg oral tablet: 2 tab(s) orally once a day (at bedtime) (28 May 2024 13:50)  sucralfate 1 g oral tablet: 1 tab(s) orally once a day (28 May 2024 13:50)  tiotropium 2.5 mcg/inh inhalation aerosol: 2 puff(s) inhaled once a day (28 May 2024 13:50)  torsemide 20 mg oral tablet: 1 tab(s) orally 2 times a day (28 May 2024 13:50)  triamcinolone 0.025% topical cream: Apply topically to affected area 2 times a day (28 May 2024 13:50)  vancomycin 750 mg intravenous injection: 750 milligram(s) intravenous every 12 hours (11 Jun 2024 21:55)    MEDICATIONS  (STANDING):  chlorhexidine 0.12% Liquid 15 milliLiter(s) Oral Mucosa every 12 hours  chlorhexidine 2% Cloths 1 Application(s) Topical daily  dextrose 10% Bolus 125 milliLiter(s) IV Bolus once  dextrose 5%. 1000 milliLiter(s) (100 mL/Hr) IV Continuous <Continuous>  dextrose 5%. 1000 milliLiter(s) (50 mL/Hr) IV Continuous <Continuous>  dextrose 50% Injectable 25 Gram(s) IV Push once  dextrose 50% Injectable 12.5 Gram(s) IV Push once  EPINEPHrine    Infusion 0.05 MICROgram(s)/kG/Min (15.3 mL/Hr) IV Continuous <Continuous>  glucagon  Injectable 1 milliGRAM(s) IntraMuscular once  insulin regular Infusion 10 Unit(s)/Hr (10 mL/Hr) IV Continuous <Continuous>  lactated ringers. 1000 milliLiter(s) (100 mL/Hr) IV Continuous <Continuous>  norepinephrine Infusion 0.05 MICROgram(s)/kG/Min (9.5 mL/Hr) IV Continuous <Continuous>  pantoprazole  Injectable 40 milliGRAM(s) IV Push daily  propofol Infusion 5 MICROgram(s)/kG/Min (2.45 mL/Hr) IV Continuous <Continuous>  sodium bicarbonate  Infusion 0.074 mEq/kG/Hr (50 mL/Hr) IV Continuous <Continuous>    MEDICATIONS  (PRN):  dextrose Oral Gel 15 Gram(s) Oral once PRN Blood Glucose LESS THAN 70 milliGRAM(s)/deciliter  sodium chloride 0.9% lock flush 10 milliLiter(s) IV Push every 1 hour PRN Pre/post blood products, medications, blood draw, and to maintain line patency      REVIEW OF SYSTEMS:  General:   Respiratory: No cough, SOB  Cardiovascular: No CP or Palpitations	  Gastrointestinal: No nausea, Vomiting. No diarrhea  Genitourinary: No urinary complaints	  Musculoskeletal: No leg swelling, No new rash or lesions	  Neurological: 	  all other systems negative    T(F): 100.2 (06-14-24 @ 07:59), Max: 100.2 (06-14-24 @ 04:39)  HR: 111 (06-14-24 @ 08:30) (51 - 111)  BP: 101/59 (06-14-24 @ 08:00) (79/52 - 120/85)  RR: 15 (06-14-24 @ 08:00) (14 - 22)  SpO2: 100% (06-14-24 @ 08:30) (92% - 100%)  Wt(kg): --    PHYSICAL EXAM:  General: NAD  Respiratory: b/l air entry  Cardiovascular: S1 S2  Gastrointestinal: soft  Extremities: edema    Mode: AC/ CMV (Assist Control/ Continuous Mandatory Ventilation)  RR (machine): 18  TV (machine): 450  FiO2: 30  PEEP: 5  ITime: 1  MAP: 13  PIP: 24      06-14    140  |  103  |  47<H>  ----------------------------<  469<HH>  3.8   |  14<L>  |  3.10<H>    Ca    8.4<L>      14 Jun 2024 04:30  Phos  2.8     06-14  Mg     2.2     06-14    TPro  6.8  /  Alb  2.7<L>  /  TBili  0.7  /  DBili  x   /  AST  385<H>  /  ALT  435<H>  /  AlkPhos  152<H>  06-14                          13.7   34.23 )-----------( 446      ( 14 Jun 2024 04:30 )             45.0       Hemoglobin: 13.7 g/dL (06-14 @ 04:30)  Hematocrit: 45.0 % (06-14 @ 04:30)  Potassium: 3.8 mmol/L (06-14 @ 04:30)  Blood Urea Nitrogen: 47 mg/dL (06-14 @ 04:30)      Creatinine, Serum: 3.10 (06-14 @ 04:30)  Creatinine, Serum: 1.70 (06-13 @ 23:55)      Urinalysis Basic - ( 14 Jun 2024 04:30 )    Color: x / Appearance: x / SG: x / pH: x  Gluc: 469 mg/dL / Ketone: x  / Bili: x / Urobili: x   Blood: x / Protein: x / Nitrite: x   Leuk Esterase: x / RBC: x / WBC x   Sq Epi: x / Non Sq Epi: x / Bacteria: x      LIVER FUNCTIONS - ( 14 Jun 2024 04:30 )  Alb: 2.7 g/dL / Pro: 6.8 g/dL / ALK PHOS: 152 U/L / ALT: 435 U/L / AST: 385 U/L / GGT: x           CARDIAC MARKERS ( 13 Jun 2024 23:55 )  x     / x     / x     / x     / <1.0 ng/mL            ABG - ( 14 Jun 2024 04:42 )  pH, Arterial: 7.26  pH, Blood: x     /  pCO2: 31    /  pO2: 143   / HCO3: 14    / Base Excess: -13.2 /  SaO2: 100.0             I&O's Detail    13 Jun 2024 07:01  -  14 Jun 2024 07:00  --------------------------------------------------------  IN:    EPINEPHrine: 342 mL    Insulin: 10 mL    IV PiggyBack: 50 mL    IV PiggyBack: 250 mL    Lactated Ringers: 700 mL    Norepinephrine: 332.5 mL    Propofol: 140 mL    Sodium Bicarbonate: 50 mL  Total IN: 1874.5 mL    OUT:    Indwelling Catheter - Urethral (mL): 0 mL    Nasogastric/Oral tube (mL): 10 mL  Total OUT: 10 mL    Total NET: 1864.5 mL               Patient is a 53y old  Male who presents with a chief complaint of AHRF (14 Jun 2024 07:16)    HPI:  Known from rehab  Claude Villareal is a 52 YO M with past medical history of  AF on Eliquis, indwelling Aguilera, CAD s/p stents, CVA, DMT 2, Hypertension, osteomyelitis s/p debridement, PE, transferred from [facility] for difficulty in breathing.  His recent admission was for osteomyelitis and discharged on IV Vancomycin. On ED arrival he is unresponsive, apneic, no palpable pulse or blood pressure, EKG rhythm HR < 20/min W/O P waves. Patient intubated, Code ACLS activated, IV epinephrine, IV atropine administered, pulse became palpable, still Hypotensive, IV levophed administered. Admitted to ICU consulted,  Patient woke up and agitated try to pull out ETT,  IV propofol started,  Patient had no nausea or vomiting in ELIAS.   (13 Jun 2024 23:25)    Renal consult called for OBI. History obtained from chart.       PAST MEDICAL HISTORY:  No pertinent past medical history    Diabetes    No pertinent past medical history    Diabetes mellitus with no complication    Afib    Hypertension    BPH (benign prostatic hyperplasia)    Perforated gastric ulcer    Pulmonary embolism    History of non-ST elevation myocardial infarction (NSTEMI)    Osteomyelitis    CAD S/P percutaneous coronary angioplasty    Cerebrovascular accident        PAST SURGICAL HISTORY:  No significant past surgical history    No significant past surgical history    H/O abdominal surgery    Perforated gastric ulcer    Traumatic amputation of left foot, initial encounter        FAMILY HISTORY:  FH: pulmonary embolism  Mother    FH: coronary artery disease  Father    FH: stroke  Father        SOCIAL HISTORY: No smoking or alcohol use     Allergies    ertapenem (Blisters; Rash)  fish (Hives)    Intolerances      Home Medications:  acetaminophen 325 mg oral tablet: 2 tab(s) orally every 6 hours As needed Temp greater or equal to 38C (100.4F), Mild Pain (1 - 3) (07 Jun 2024 20:10)  Allegra 60 mg oral tablet: 1 tab(s) orally once a day (28 May 2024 13:50)  apixaban 5 mg oral tablet: 1 tab(s) orally 2 times a day (28 May 2024 13:50)  ascorbic acid 1000 mg oral tablet: 1 tab(s) orally once a day (28 May 2024 13:50)  aspirin 81 mg oral tablet, chewable: 1 tab(s) chewed once a day (28 May 2024 13:50)  atorvastatin 40 mg oral tablet: 1 tab(s) orally once a day (28 May 2024 13:50)  cholecalciferol 1250 mcg (50,000 intl units) oral capsule: 1 cap(s) orally once a month on 15th (28 May 2024 13:50)  cranberry oral capsule: 450 milligram(s) orally once a day (28 May 2024 13:50)  Deep Sea Nasal 0.65% nasal spray: 2 spray(s) in each nostril once a day (28 May 2024 13:50)  DilTIAZem (Eqv-Cardizem CD) 360 mg/24 hours oral capsule, extended release: 1 cap(s) orally once a day (28 May 2024 13:50)  diphenhydrAMINE 25 mg oral capsule: 1 cap(s) orally every 4 hours As needed Rash and/or Itching (07 Jun 2024 20:10)  docusate sodium 100 mg oral capsule: 3 cap(s) orally once a day (28 May 2024 13:50)  ertapenem 1 g injection: 1 gram(s) injectable once a day (07 Jun 2024 20:10)  famotidine 20 mg oral tablet: 1 tab(s) orally once a day (28 May 2024 13:50)  ferrous sulfate 325 mg (65 mg elemental iron) oral tablet: 1 tab(s) orally 2 times a day (28 May 2024 13:50)  gabapentin 300 mg oral capsule: 1 cap(s) orally every 12 hours (28 May 2024 13:50)  HumuLIN R 100 units/mL injectable solution: sliding scale (28 May 2024 13:50)  insulin glargine 100 units/mL subcutaneous solution: 15 unit(s) subcutaneous once a day 09:00am (28 May 2024 13:50)  insulin glargine 100 units/mL subcutaneous solution: 7 unit(s) subcutaneous once a day (at bedtime) 21:00pm (28 May 2024 13:50)  levocetirizine 5 mg oral tablet: 1 tab(s) orally once a day (at bedtime) (28 May 2024 13:50)  Medrol Dosepak 4 mg oral tablet: 1 packet(s) orally once take as directed on packet (13 Jun 2024 09:36)  melatonin 5 mg oral tablet: 1 tab(s) orally once a day (at bedtime) (28 May 2024 13:50)  metFORMIN 1000 mg oral tablet: 1 tab(s) orally 2 times a day (28 May 2024 13:50)  metoclopramide 5 mg oral tablet: 1 tab(s) orally 3 times a day (28 May 2024 13:50)  metoprolol succinate 100 mg oral tablet, extended release: 1 tab(s) orally once a day (07 Jun 2024 20:10)  Multiple Vitamins with Minerals oral tablet: 1 tab(s) orally once a day (28 May 2024 13:50)  naloxegol 25 mg oral tablet: 1 tab(s) orally once a day (28 May 2024 13:50)  ondansetron 2 mg/mL injectable solution: 4 milligram(s) intravenously every 8 hours as needed for  nausea (28 May 2024 13:50)  oxyBUTYnin 10 mg/24 hr oral tablet, extended release: 1 tab(s) orally once a day (28 May 2024 13:50)  oxyCODONE 10 mg oral tablet: 1 tab(s) orally 2 times a day (28 May 2024 13:50)  pantoprazole 40 mg oral delayed release tablet: 1 tab(s) orally once a day (28 May 2024 13:50)  polyethylene glycol 3350 oral powder for reconstitution: 17 gram(s) orally 2 times a day (28 May 2024 13:50)  Potassium Chloride (Eqv-Klor-Con M20) 20 mEq oral tablet, extended release: 1 tab(s) orally 2 times a day (28 May 2024 13:50)  Pyridium 100 mg oral tablet: 1 tab(s) orally 3 times a day as needed (28 May 2024 13:50)  senna (sennosides) 8.6 mg oral tablet: 2 tab(s) orally once a day (at bedtime) (28 May 2024 13:50)  sucralfate 1 g oral tablet: 1 tab(s) orally once a day (28 May 2024 13:50)  tiotropium 2.5 mcg/inh inhalation aerosol: 2 puff(s) inhaled once a day (28 May 2024 13:50)  torsemide 20 mg oral tablet: 1 tab(s) orally 2 times a day (28 May 2024 13:50)  triamcinolone 0.025% topical cream: Apply topically to affected area 2 times a day (28 May 2024 13:50)  vancomycin 750 mg intravenous injection: 750 milligram(s) intravenous every 12 hours (11 Jun 2024 21:55)    MEDICATIONS  (STANDING):  chlorhexidine 0.12% Liquid 15 milliLiter(s) Oral Mucosa every 12 hours  chlorhexidine 2% Cloths 1 Application(s) Topical daily  dextrose 10% Bolus 125 milliLiter(s) IV Bolus once  dextrose 5%. 1000 milliLiter(s) (100 mL/Hr) IV Continuous <Continuous>  dextrose 5%. 1000 milliLiter(s) (50 mL/Hr) IV Continuous <Continuous>  dextrose 50% Injectable 25 Gram(s) IV Push once  dextrose 50% Injectable 12.5 Gram(s) IV Push once  EPINEPHrine    Infusion 0.05 MICROgram(s)/kG/Min (15.3 mL/Hr) IV Continuous <Continuous>  glucagon  Injectable 1 milliGRAM(s) IntraMuscular once  insulin regular Infusion 10 Unit(s)/Hr (10 mL/Hr) IV Continuous <Continuous>  lactated ringers. 1000 milliLiter(s) (100 mL/Hr) IV Continuous <Continuous>  norepinephrine Infusion 0.05 MICROgram(s)/kG/Min (9.5 mL/Hr) IV Continuous <Continuous>  pantoprazole  Injectable 40 milliGRAM(s) IV Push daily  propofol Infusion 5 MICROgram(s)/kG/Min (2.45 mL/Hr) IV Continuous <Continuous>  sodium bicarbonate  Infusion 0.074 mEq/kG/Hr (50 mL/Hr) IV Continuous <Continuous>    MEDICATIONS  (PRN):  dextrose Oral Gel 15 Gram(s) Oral once PRN Blood Glucose LESS THAN 70 milliGRAM(s)/deciliter  sodium chloride 0.9% lock flush 10 milliLiter(s) IV Push every 1 hour PRN Pre/post blood products, medications, blood draw, and to maintain line patency      REVIEW OF SYSTEMS:  General: on vent    T(F): 100.2 (06-14-24 @ 07:59), Max: 100.2 (06-14-24 @ 04:39)  HR: 111 (06-14-24 @ 08:30) (51 - 111)  BP: 101/59 (06-14-24 @ 08:00) (79/52 - 120/85)  RR: 15 (06-14-24 @ 08:00) (14 - 22)  SpO2: 100% (06-14-24 @ 08:30) (92% - 100%)  Wt(kg): --    PHYSICAL EXAM:  General: NAD  Respiratory: b/l air entry  Cardiovascular: S1 S2  Gastrointestinal: soft  Extremities: left TMA, Chronic skin changes    Mode: AC/ CMV (Assist Control/ Continuous Mandatory Ventilation)  RR (machine): 18  TV (machine): 450  FiO2: 30  PEEP: 5  ITime: 1  MAP: 13  PIP: 24      06-14    140  |  103  |  47<H>  ----------------------------<  469<HH>  3.8   |  14<L>  |  3.10<H>    Ca    8.4<L>      14 Jun 2024 04:30  Phos  2.8     06-14  Mg     2.2     06-14    TPro  6.8  /  Alb  2.7<L>  /  TBili  0.7  /  DBili  x   /  AST  385<H>  /  ALT  435<H>  /  AlkPhos  152<H>  06-14                          13.7   34.23 )-----------( 446      ( 14 Jun 2024 04:30 )             45.0       Hemoglobin: 13.7 g/dL (06-14 @ 04:30)  Hematocrit: 45.0 % (06-14 @ 04:30)  Potassium: 3.8 mmol/L (06-14 @ 04:30)  Blood Urea Nitrogen: 47 mg/dL (06-14 @ 04:30)      Creatinine, Serum: 3.10 (06-14 @ 04:30)  Creatinine, Serum: 1.70 (06-13 @ 23:55)      Urinalysis Basic - ( 14 Jun 2024 04:30 )    Color: x / Appearance: x / SG: x / pH: x  Gluc: 469 mg/dL / Ketone: x  / Bili: x / Urobili: x   Blood: x / Protein: x / Nitrite: x   Leuk Esterase: x / RBC: x / WBC x   Sq Epi: x / Non Sq Epi: x / Bacteria: x      LIVER FUNCTIONS - ( 14 Jun 2024 04:30 )  Alb: 2.7 g/dL / Pro: 6.8 g/dL / ALK PHOS: 152 U/L / ALT: 435 U/L / AST: 385 U/L / GGT: x           CARDIAC MARKERS ( 13 Jun 2024 23:55 )  x     / x     / x     / x     / <1.0 ng/mL            ABG - ( 14 Jun 2024 04:42 )  pH, Arterial: 7.26  pH, Blood: x     /  pCO2: 31    /  pO2: 143   / HCO3: 14    / Base Excess: -13.2 /  SaO2: 100.0             I&O's Detail    13 Jun 2024 07:01  -  14 Jun 2024 07:00  --------------------------------------------------------  IN:    EPINEPHrine: 342 mL    Insulin: 10 mL    IV PiggyBack: 50 mL    IV PiggyBack: 250 mL    Lactated Ringers: 700 mL    Norepinephrine: 332.5 mL    Propofol: 140 mL    Sodium Bicarbonate: 50 mL  Total IN: 1874.5 mL    OUT:    Indwelling Catheter - Urethral (mL): 0 mL    Nasogastric/Oral tube (mL): 10 mL  Total OUT: 10 mL    Total NET: 1864.5 mL        < from: CT Abdomen and Pelvis No Cont (06.14.24 @ 00:50) >    ACC: [id number] EXAM:  CT ABDOMEN AND PELVIS   ORDERED BY:  BRIEN MARTINEZ     ACC: [id number] EXAM:  CT CHEST   ORDERED BY:  BRIEN MARTINEZ     PROCEDURE DATE:  06/14/2024          INTERPRETATION:  CLINICAL INFORMATION: Bradycardia, cardiac arrest,   distended abdomen    COMPARISON: CT abdomen and pelvis 3/17/2024.    CONTRAST/COMPLICATIONS:  IV Contrast: NONE  Oral Contrast: NONE  Complications: None reported at time of study completion    PROCEDURE:  CT of the Chest, Abdomen and Pelvis was performed.  Sagittal and coronal reformats were performed.    FINDINGS:  CHEST:  LUNGS AND LARGE AIRWAYS: Patient is intubated. The endotracheal tube ends   approximately 5 cm above the sowmya. Patent central airways. No pulmonary   nodules. Centrilobular emphysema. There are linear left upper lobe and   bilateral lower lobe linear atelectatic changes.  PLEURA: No pleural effusion.  VESSELS: The ascending thoracic aorta measures 4 cm. The pulmonary trunk   measures 3.2 cm. Coronary artery calcifications. Peripheral central   venous catheter ends with the catheter tip in the right atrium.  HEART: Heart size is enlarged. No pericardial effusion.  MEDIASTINUM AND ANAYA: No lymphadenopathy.  CHEST WALL AND LOWER NECK: Thyroid gland within normallimits. No   axillary adenopathy.    ABDOMEN AND PELVIS:  LIVER: Within normal limits. Punctate calcifications near the gallbladder   bed.  BILE DUCTS: Normal caliber.  GALLBLADDER: Within normal limits.  SPLEEN: Within normal limits.  PANCREAS: Fatty involution and lobulation of the pancreas.  ADRENALS: Within normal limits.  KIDNEYS/URETERS: No hydronephrosis, hydroureter or   nephroureterolithiasis. Mild bilateral perinephric stranding, unchanged   since 3/17/2024.    BLADDER: Minimally distended. Aguilera catheter in place  REPRODUCTIVE ORGANS: Prostate within normal limits.    BOWEL: This is an enteric tube in place ending in the gastric fundus.   There are numerous small bowel loops which are distended, gas- and   fluid-filled with air-fluid levels measuring up to 3.7 cm which involves   almost the entire small bowel. There is no obvious transition point.   Diameter of small bowel loops is tapering towards the terminal ileum.   Transverse and distal colon are decompressed, liquid stools are seen in   the ascending colon. Appendix is normal.  PERITONEUM/RETROPERITONEUM: No ascites, pneumoperitoneum or loculated   fluid collections.  VESSELS: Infrarenal abdominal aorta measures 2.7 cm. Atherosclerotic   changes.  LYMPH NODES: No lymphadenopathy.  ABDOMINAL WALL: Fat-containing umbilical hernia. Left inguinal hernia   fat-containing.  BONES: Degenerative changes of the spine.    IMPRESSION:  Numerous mildly distended gas-filled and fluid-filled small bowel loops   with air-fluid levels and no transition point involving the entire small   bowel is most suggestive of ileus.    No ascites or pneumoperitoneum.    Emphysema.      Preliminary report: Motion degraded exam. Emphysema. Mildly dilated loops   of small bowel with gradualtransition, most likely reflecting ileus.   Early/developing obstruction not fully excluded.        --- End of Report ---            JORGE PEGUERO MD; Attending Radiologist  This document has been electronically signed. Jun 14 2024  9:40AM    < end of copied text >

## 2024-06-14 NOTE — PROGRESS NOTE ADULT - SUBJECTIVE AND OBJECTIVE BOX
INTERVAL HPI/OVERNIGHT EVENTS: Patient with recent hospitalization for R heel OM s/p debridement and discharged yesterday presented to the ED from [facility] for difficulty breathing.    On arrival to the ED patient was found to be apneic and bradycardic.  He was intubated, briefly received CPR, and received IV epi and atropine with ROSC.  He was initiated on levophed.  Workup revealed a leukocytosis, OBI, hyperglycemia, metabolic/respiratory acidosis, transaminitis.  ICU consulted for the above. On evaluation, patient bradycardic and then proceeded to suffer another brief asystolic cardiac arrest.  Given one epi and one bicarb with ROSC.  Transferred to ICU.       SUBJECTIVE: Seen and examined pt at bedside. Intubated and sedated.    Review of Systems: unable to obtain 2/2 intubated/ sedated    ICU Vital Signs Last 24 Hrs  T(C): 37.9 (14 Jun 2024 07:59), Max: 37.9 (14 Jun 2024 04:39)  T(F): 100.2 (14 Jun 2024 07:59), Max: 100.2 (14 Jun 2024 04:39)  HR: 123 (14 Jun 2024 09:30) (51 - 123)  BP: 88/53 (14 Jun 2024 09:30) (79/52 - 120/85)  BP(mean): 65 (14 Jun 2024 09:30) (61 - 95)  ABP: --  ABP(mean): --  RR: 16 (14 Jun 2024 09:30) (14 - 22)  SpO2: 100% (14 Jun 2024 09:30) (92% - 100%)    O2 Parameters below as of 14 Jun 2024 06:00  Patient On (Oxygen Delivery Method): ventilator    O2 Concentration (%): 40        Mode: AC/ CMV (Assist Control/ Continuous Mandatory Ventilation), RR (machine): 18, TV (machine): 450, FiO2: 30, PEEP: 5  06-13-24 @ 07:01  -  06-14-24 @ 07:00  --------------------------------------------------------  IN: 1874.5 mL / OUT: 10 mL / NET: 1864.5 mL        CAPILLARY BLOOD GLUCOSE      POCT Blood Glucose.: 396 mg/dL (14 Jun 2024 10:21)      I&O's Summary    13 Jun 2024 07:01  -  14 Jun 2024 07:00  --------------------------------------------------------  IN: 1874.5 mL / OUT: 10 mL / NET: 1864.5 mL        PHYSICAL EXAM:  General: Ill appearing male +intubated/sedated  HEENT: Pupils equal, reactive to light.  Symmetric.  PULM: Clear to auscultation bilaterally  CVS: +tachycardia, irregularly irregular   ABD: Distended abdomen   EXT: Wounds to the b/l LE (LLE transmetatarsal amputation)  SKIN: +blistering b/l RUE Erythematous rash appreciated to the trunk and b/l UE extremities   NEURO: Nonpurposeful movements     Meds:    EPINEPHrine    Infusion IV Continuous  norepinephrine Infusion IV Continuous    dextrose 50% Injectable IV Push  dextrose 50% Injectable IV Push  dextrose Oral Gel Oral PRN  glucagon  Injectable IntraMuscular  insulin regular Infusion IV Continuous      ketamine Infusion. IV Continuous  propofol Infusion IV Continuous        naloxegol Oral  pantoprazole  Injectable IV Push  polyethylene glycol 3350 Oral  senna Oral      dextrose 10% Bolus IV Bolus  dextrose 5%. IV Continuous  dextrose 5%. IV Continuous  sodium bicarbonate  Infusion IV Continuous  sodium chloride 0.9% lock flush IV Push PRN      chlorhexidine 0.12% Liquid Oral Mucosa  chlorhexidine 2% Cloths Topical                              13.7   34.23 )-----------( 446      ( 14 Jun 2024 04:30 )             45.0     Bands 5.0    06-14    140  |  103  |  47<H>  ----------------------------<  469<HH>  3.8   |  14<L>  |  3.10<H>    Ca    8.4<L>      14 Jun 2024 04:30  Phos  2.8     06-14  Mg     2.2     06-14    TPro  6.8  /  Alb  2.7<L>  /  TBili  0.7  /  DBili  x   /  AST  385<H>  /  ALT  435<H>  /  AlkPhos  152<H>  06-14    Lactate 12.9           06-14 @ 04:30    Lactate 12.8           06-14 @ 01:25    Lactate 13.8           06-13 @ 22:55      CARDIAC MARKERS ( 13 Jun 2024 23:55 )  x     / x     / x     / x     / <1.0 ng/mL      PT/INR - ( 14 Jun 2024 04:30 )   PT: 26.6 sec;   INR: 2.33 ratio         PTT - ( 14 Jun 2024 04:30 )  PTT:42.6 sec  Urinalysis Basic - ( 14 Jun 2024 04:30 )    Color: x / Appearance: x / SG: x / pH: x  Gluc: 469 mg/dL / Ketone: x  / Bili: x / Urobili: x   Blood: x / Protein: x / Nitrite: x   Leuk Esterase: x / RBC: x / WBC x   Sq Epi: x / Non Sq Epi: x / Bacteria: x                  Radiology: < from: CT Chest No Cont (06.14.24 @ 00:51) >  IMPRESSION:  Numerous mildly distended gas-filled and fluid-filled small bowel loops   with air-fluid levels and no transition point involving the entire small   bowel is most suggestive of ileus.    No ascites or pneumoperitoneum.    Emphysema.      Preliminary report: Motion degraded exam. Emphysema. Mildly dilated loops   of small bowel with gradualtransition, most likely reflecting ileus.   Early/developing obstruction not fully excluded.    < end of copied text >  < from: CT Head No Cont (06.14.24 @ 00:44) >  IMPRESSION:  No CT evidence of acute intracranial pathology.    Please note that CT scan is insensitive for early acute ischemia.  Repeat   CT in 12-24 hours or follow-up MRI is recommended for further evaluation.    Poor dentition with numerous large dentalcaries throughout the upper   teeth.    < end of copied text >      Bedside Ultrasound:    Tubes/Lines: LIJ cenral line , R PICC (placed prior to hospital arrival) , L PIV  +NGT, +jacques cath changed in ICU      GLOBAL ISSUE/BEST PRACTICE:  Analgesia: Y  Sedation: Y  HOB elevation: Y  Stress ulcer prophylaxis: Y  VTE prophylaxis: SCDs  Glycemic control: Y  Nutrition: NPO    CODE STATUS: FULL CODE        INTERVAL HPI/OVERNIGHT EVENTS: Patient with recent hospitalization for R heel OM s/p debridement and discharged yesterday presented to the ED from [facility] for difficulty breathing.    On arrival to the ED patient was found to be apneic and bradycardic.  He was intubated, briefly received CPR, and received IV epi and atropine with ROSC.  He was initiated on levophed.  Workup revealed a leukocytosis, OBI, hyperglycemia, metabolic/respiratory acidosis, transaminitis.  ICU consulted for the above. On evaluation, patient bradycardic and then proceeded to suffer another brief asystolic cardiac arrest.  Given one epi and one bicarb with ROSC.  Transferred to ICU.       SUBJECTIVE: Seen and examined pt at bedside. Intubated and sedated.    Review of Systems: unable to obtain 2/2 intubated/ sedated    ICU Vital Signs Last 24 Hrs  T(C): 37.9 (14 Jun 2024 07:59), Max: 37.9 (14 Jun 2024 04:39)  T(F): 100.2 (14 Jun 2024 07:59), Max: 100.2 (14 Jun 2024 04:39)  HR: 123 (14 Jun 2024 09:30) (51 - 123)  BP: 88/53 (14 Jun 2024 09:30) (79/52 - 120/85)  BP(mean): 65 (14 Jun 2024 09:30) (61 - 95)  ABP: --  ABP(mean): --  RR: 16 (14 Jun 2024 09:30) (14 - 22)  SpO2: 100% (14 Jun 2024 09:30) (92% - 100%)    O2 Parameters below as of 14 Jun 2024 06:00  Patient On (Oxygen Delivery Method): ventilator    O2 Concentration (%): 40        Mode: AC/ CMV (Assist Control/ Continuous Mandatory Ventilation), RR (machine): 18, TV (machine): 450, FiO2: 30, PEEP: 5  06-13-24 @ 07:01  -  06-14-24 @ 07:00  --------------------------------------------------------  IN: 1874.5 mL / OUT: 10 mL / NET: 1864.5 mL        CAPILLARY BLOOD GLUCOSE      POCT Blood Glucose.: 396 mg/dL (14 Jun 2024 10:21)      I&O's Summary    13 Jun 2024 07:01  -  14 Jun 2024 07:00  --------------------------------------------------------  IN: 1874.5 mL / OUT: 10 mL / NET: 1864.5 mL        PHYSICAL EXAM:  General: Ill appearing male +intubated/sedated  HEENT: Pupils equal, reactive to light.  Symmetric.  PULM: Clear to auscultation bilaterally  CVS: +tachycardia, irregularly irregular   ABD: mildly Distended abdomen   EXT: Wounds to the b/l LE (LLE transmetatarsal amputation)  SKIN: +blistering b/l RUE Erythematous rash appreciated to the trunk and b/l UE extremities   NEURO: Nonpurposeful movements     Meds:    EPINEPHrine    Infusion IV Continuous  norepinephrine Infusion IV Continuous    dextrose 50% Injectable IV Push  dextrose 50% Injectable IV Push  dextrose Oral Gel Oral PRN  glucagon  Injectable IntraMuscular  insulin regular Infusion IV Continuous      ketamine Infusion. IV Continuous  propofol Infusion IV Continuous        naloxegol Oral  pantoprazole  Injectable IV Push  polyethylene glycol 3350 Oral  senna Oral      dextrose 10% Bolus IV Bolus  dextrose 5%. IV Continuous  dextrose 5%. IV Continuous  sodium bicarbonate  Infusion IV Continuous  sodium chloride 0.9% lock flush IV Push PRN      chlorhexidine 0.12% Liquid Oral Mucosa  chlorhexidine 2% Cloths Topical                              13.7   34.23 )-----------( 446      ( 14 Jun 2024 04:30 )             45.0     Bands 5.0    06-14    140  |  103  |  47<H>  ----------------------------<  469<HH>  3.8   |  14<L>  |  3.10<H>    Ca    8.4<L>      14 Jun 2024 04:30  Phos  2.8     06-14  Mg     2.2     06-14    TPro  6.8  /  Alb  2.7<L>  /  TBili  0.7  /  DBili  x   /  AST  385<H>  /  ALT  435<H>  /  AlkPhos  152<H>  06-14    Lactate 12.9           06-14 @ 04:30    Lactate 12.8           06-14 @ 01:25    Lactate 13.8           06-13 @ 22:55      CARDIAC MARKERS ( 13 Jun 2024 23:55 )  x     / x     / x     / x     / <1.0 ng/mL      PT/INR - ( 14 Jun 2024 04:30 )   PT: 26.6 sec;   INR: 2.33 ratio         PTT - ( 14 Jun 2024 04:30 )  PTT:42.6 sec  Urinalysis Basic - ( 14 Jun 2024 04:30 )    Color: x / Appearance: x / SG: x / pH: x  Gluc: 469 mg/dL / Ketone: x  / Bili: x / Urobili: x   Blood: x / Protein: x / Nitrite: x   Leuk Esterase: x / RBC: x / WBC x   Sq Epi: x / Non Sq Epi: x / Bacteria: x                  Radiology: < from: CT Chest No Cont (06.14.24 @ 00:51) >  IMPRESSION:  Numerous mildly distended gas-filled and fluid-filled small bowel loops   with air-fluid levels and no transition point involving the entire small   bowel is most suggestive of ileus.    No ascites or pneumoperitoneum.    Emphysema.      Preliminary report: Motion degraded exam. Emphysema. Mildly dilated loops   of small bowel with gradualtransition, most likely reflecting ileus.   Early/developing obstruction not fully excluded.    < end of copied text >  < from: CT Head No Cont (06.14.24 @ 00:44) >  IMPRESSION:  No CT evidence of acute intracranial pathology.    Please note that CT scan is insensitive for early acute ischemia.  Repeat   CT in 12-24 hours or follow-up MRI is recommended for further evaluation.    Poor dentition with numerous large dentalcaries throughout the upper   teeth.    < end of copied text >      Bedside Ultrasound:    Tubes/Lines: LIJ cenral line , R PICC (placed prior to hospital arrival) , L PIV  +NGT, +jacques cath changed in ICU      GLOBAL ISSUE/BEST PRACTICE:  Analgesia: Y  Sedation: Y  HOB elevation: Y  Stress ulcer prophylaxis: Y  VTE prophylaxis: SCDs  Glycemic control: Y  Nutrition: NPO    CODE STATUS: FULL CODE

## 2024-06-14 NOTE — CONSULT NOTE ADULT - ASSESSMENT
52 YO M with  AF on Eliquis, indwelling Aguilera, CAD s/p stents, CVA, DMT 2, Hypertension, osteomyelitis s/p debridement, PE, transferred from [facility] for difficulty in breathing.  His recent admission was for osteomyelitis and discharged on IV Vancomycin. Reported at [facility] he was given a dose of ertapenem and developed difficulty breathing followed by apnea. On ED arrival he was unresponsive, apneic, no palpable pulse or blood pressure, EKG rhythm HR < 20/min W/O P waves. Patient intubated,     RECOMMENDATIONS  1-Asystolic cardiac arrest, Acute hypoxic respiratory failure, Shock  as discussed with ICU team - unclear if improper dosing of cardiac meds or if based on timing this represents an anaphylactic reaction to ertapenem, cardiac event, or sepsis so for now  -rec avoiding carbapenems and fine with just Vanco  -ICU support and monitoring    2-Osteomyeliits right calcaneous  6/4 S/p Selective debridement of wound 04-Jun-2024 10:52:57  Parviz Todd  6/4  TCx MRSA, Escherichia coli ESBL  Right calcaneus bone pathology:  -   Fragments of bone with chronic osteomyelitis.  -   Focal acute osteomyelitis cannot be ruled out.  C/w vancomycin 1 gram IV q12, goal trough 15-20, dosing per pharmacy protocol and plan had been with inability to rule out osteo rec 6 weeks so last day 7/18  PICC placed 6/7  -further recs to follow based on overall picture    Thank you for consulting us and involving us in the management of this most interesting and challenging case.  We will follow along in the care of this patient. Please call us at [phone] or text me directly on my cell# at [phone] with any concerns.    Starting tomorrow Dr Armstrong will be assuming care of this patient so please contact him with any questions, concerns or new micro data.     54 YO M with  AF on Eliquis, indwelling Aguliera, CAD s/p stents, CVA, DMT 2, Hypertension, osteomyelitis s/p debridement, PE, transferred from [facility] for difficulty in breathing.  His recent admission was for osteomyelitis and discharged on IV Vancomycin. Reported at [facility] he was given a dose of ertapenem and developed difficulty breathing followed by apnea. On ED arrival he was unresponsive, apneic, no palpable pulse or blood pressure, EKG rhythm HR < 20/min W/O P waves. Patient intubated,     RECOMMENDATIONS  1-Asystolic cardiac arrest, Acute hypoxic respiratory failure, Shock  as discussed with ICU team - unclear if improper dosing of cardiac meds (got extra dose of Metoprolol ER and Dilt CD per pharmacy review) or if based on timing this represents an anaphylactic reaction to ertapenem, cardiac event, or sepsis so for now  -rec avoiding carbapenems and fine with just Vanco  -ICU support and monitoring    2-Osteomyeliits right calcaneous  6/4 S/p Selective debridement of wound 04-Jun-2024 10:52:57  Parviz Todd  6/4  TCx MRSA, Escherichia coli ESBL  Right calcaneus bone pathology:  -   Fragments of bone with chronic osteomyelitis.  -   Focal acute osteomyelitis cannot be ruled out.  C/w vancomycin 1 gram IV q12, goal trough 15-20, dosing per pharmacy protocol and plan had been with inability to rule out osteo rec 6 weeks so last day 7/18  PICC placed 6/7  -further recs to follow based on overall picture    Thank you for consulting us and involving us in the management of this most interesting and challenging case.  We will follow along in the care of this patient. Please call us at [phone] or text me directly on my cell# at [phone] with any concerns.    Starting tomorrow Dr Armstrong will be assuming care of this patient so please contact him with any questions, concerns or new micro data.

## 2024-06-14 NOTE — PATIENT PROFILE ADULT - FALL HARM RISK - HARM RISK INTERVENTIONS
Assistance OOB with selected safe patient handling equipment/Communicate Risk of Fall with Harm to all staff/Monitor for mental status changes/Move patient closer to nurses' station/Reinforce activity limits and safety measures with patient and family/Reorient to person, place and time as needed/Tailored Fall Risk Interventions/Toileting schedule using arm’s reach rule for commode and bathroom/Use of alarms - bed, chair and/or voice tab/Visual Cue: Yellow wristband and red socks/Bed in lowest position, wheels locked, appropriate side rails in place/Call bell, personal items and telephone in reach/Instruct patient to call for assistance before getting out of bed or chair/Non-slip footwear when patient is out of bed/[place] to call system/Physically safe environment - no spills, clutter or unnecessary equipment/Purposeful Proactive Rounding/Room/bathroom lighting operational, light cord in reach Assistance with ambulation/Assistance OOB with selected safe patient handling equipment/Communicate Risk of Fall with Harm to all staff/Discuss with provider need for PT consult/Monitor for mental status changes/Monitor gait and stability/Move patient closer to nurses' station/Reinforce activity limits and safety measures with patient and family/Reorient to person, place and time as needed/Review medications for side effects contributing to fall risk/Sit up slowly, dangle for a short time, stand at bedside before walking/Tailored Fall Risk Interventions/Toileting schedule using arm’s reach rule for commode and bathroom/Use of alarms - bed, chair and/or voice tab/Visual Cue: Yellow wristband and red socks/Bed in lowest position, wheels locked, appropriate side rails in place/Call bell, personal items and telephone in reach/Instruct patient to call for assistance before getting out of bed or chair/Non-slip footwear when patient is out of bed/[place] to call system/Physically safe environment - no spills, clutter or unnecessary equipment/Purposeful Proactive Rounding/Room/bathroom lighting operational, light cord in reach

## 2024-06-14 NOTE — DIETITIAN INITIAL EVALUATION ADULT - ORAL INTAKE PTA/DIET HISTORY
Pt from [facility] where he was on a NCS, 2gm Na regular diet. Ht/Wt noted: 74", 245#. Pt from [facility] where he was on a NCS, 2gm Na regular diet. Ht/Wt noted: 74", 245#.    Current wts 223/224#.  ~20# wt difference appreciated however time frame of loss not known.

## 2024-06-14 NOTE — ED ADULT NURSE NOTE - NSFALLHARMRISKINTERV_ED_ALL_ED
Assistance OOB with selected safe patient handling equipment if applicable/Assistance with ambulation/Communicate risk of Fall with Harm to all staff, patient, and family/Monitor gait and stability/Provide visual cue: red socks, yellow wristband, yellow gown, etc/Reinforce activity limits and safety measures with patient and family/Bed in lowest position, wheels locked, appropriate side rails in place/Call bell, personal items and telephone in reach/Instruct patient to call for assistance before getting out of bed/chair/stretcher/Non-slip footwear applied when patient is off stretcher/[place] to call system/Physically safe environment - no spills, clutter or unnecessary equipment/Purposeful Proactive Rounding/Room/bathroom lighting operational, light cord in reach

## 2024-06-14 NOTE — PHARMACOTHERAPY INTERVENTION NOTE - COMMENTS
Patient is a 54 yo male readmitted overnight to the ICU. Discussed with ICU team and recommended adding back patient's bowel regimen: naloxegol 25mg QD, PEG 17g QD, senna QHS. Accepted and orders were entered per discussion.    Additionally, entered CHG cloths per ICU infection-control protocol.

## 2024-06-14 NOTE — PHARMACOTHERAPY INTERVENTION NOTE - COMMENTS
Patient is a 54 yo male admitted to the ICU, medication reconciliation completed with [facility] medication order list. Patient discharged from [facility] yesterday (6/13) and re-admitted yesterday evening. Findings listed below relayed to the ICU team:    -Received metoprolol ER while inpatient 6/13 ~6am, additional dose received 6/13 at [facility] per discussion with RN.  -Received diltiazem CD while inpatient 6/13 ~6am, additional dose received 6/13 at [facility] per discussion with RN.  -Received ertapenem 1g 6/13 at [facility] per discussion with RN.  -Received vancomycin 750mg 6/13 at [facility] per discussion with RN.

## 2024-06-14 NOTE — CONSULT NOTE ADULT - SUBJECTIVE AND OBJECTIVE BOX
Patient is a 53y old  Male who presents with a chief complaint of shortness of breath     BRIEF HOSPITAL COURSE: 53 year old male with a PMH of AF on Eliquis, indwelling jacques, CAD s/p stents, CVA, DM, HTN, hx PE, recent hospitalization discharged yesterday after an admission for R heel osteomyelitis w/ debridement who presented to the ED from [facility] for difficulty breathing.  On arrival to the ED patient was found to be apneic and bradycardic.  He was intubated, briefly received CPR, and received IV epi and atropine with ROSC.  He was initiated on levophed.  Workup revelaed a leukocytosis, OBI, hyperglycemia, metabolic/respiratory acidosis, transaminitis.  ICU consulted for the above.  On my evaluation, patient bradycardic and then proceeded to suffer another brief asystolic cardiac arrest.  Given one epi and one bicarb with ROSC.  Transferred to ICU.     ROS: Unable to obtain     PAST MEDICAL & SURGICAL HISTORY:  Diabetes  Diabetes mellitus with no complication  Afib  Hypertension  BPH (benign prostatic hyperplasia)  Perforated gastric ulcer  s/p emergent ex-lap omentopexy and plication 6/2019  Pulmonary embolism  History of non-ST elevation myocardial infarction (NSTEMI)  Osteomyelitis  s/p debridement  CAD S/P percutaneous coronary angioplasty  Cerebrovascular accident  H/O abdominal surgery  Perforated gastric ulcer  Traumatic amputation of left foot, initial encounter        Medications:  propofol Infusion 5 MICROgram(s)/kG/Min IV Continuous <Continuous>  sodium chloride 0.9% Bolus 1000 milliLiter(s) IV Bolus once  chlorhexidine 0.12% Liquid 15 milliLiter(s) Oral Mucosa every 12 hours      Mode: AC/ CMV (Assist Control/ Continuous Mandatory Ventilation)  RR (machine): 18  TV (machine): 500  FiO2: 100  PEEP: 5  ITime: 1  MAP: 18  PIP: 22      ICU Vital Signs Last 24 Hrs  T(C): 36.6 (13 Jun 2024 23:20), Max: 37.1 (13 Jun 2024 06:16)  T(F): 97.9 (13 Jun 2024 23:20), Max: 98.8 (13 Jun 2024 06:16)  HR: 52 (13 Jun 2024 23:20) (52 - 83)  BP: 113/56 (13 Jun 2024 23:20) (103/68 - 113/56)  BP(mean): --  ABP: --  ABP(mean): --  RR: 18 (13 Jun 2024 23:20) (17 - 20)  SpO2: 100% (13 Jun 2024 23:20) (91% - 100%)    O2 Parameters below as of 13 Jun 2024 23:20  Patient On (Oxygen Delivery Method): ventilator        ABG - ( 13 Jun 2024 23:41 )  pH, Arterial: 7.05  pH, Blood: x     /  pCO2: 35    /  pO2: 164   / HCO3: 10    / Base Excess: -20.8 /  SaO2: 99.6          I&O's Detail        LABS:                        13.6 23.52 )-----------( 320      ( 13 Jun 2024 22:55 )             46.4     06-13    143  |  116<H>  |  32<H>  ----------------------------<  355<H>  4.3   |  11<L>  |  1.70<H>    Ca    6.1<LL>      13 Jun 2024 23:55  Mg     1.4     06-13    TPro  5.0<L>  /  Alb  1.8<L>  /  TBili  0.6  /  DBili  x   /  AST  207<H>  /  ALT  203<H>  /  AlkPhos  99  06-13      CARDIAC MARKERS ( 13 Jun 2024 23:55 )  x     / x     / x     / x     / <1.0 ng/mL      CAPILLARY BLOOD GLUCOSE      POCT Blood Glucose.: 240 mg/dL (13 Jun 2024 07:45)    PT/INR - ( 13 Jun 2024 22:55 )   PT: 25.9 sec;   INR: 2.27 ratio         PTT - ( 13 Jun 2024 22:55 )  PTT:44.0 sec  Urinalysis Basic - ( 13 Jun 2024 23:55 )    Color: x / Appearance: x / SG: x / pH: x  Gluc: 355 mg/dL / Ketone: x  / Bili: x / Urobili: x   Blood: x / Protein: x / Nitrite: x   Leuk Esterase: x / RBC: x / WBC x   Sq Epi: x / Non Sq Epi: x / Bacteria: x      CULTURES:      Physical Examination:    General: Ill appearing male     HEENT: Pupils equal, reactive to light.  Symmetric.    PULM: Clear to auscultation bilaterally    CVS: Bradycardic irregular     ABD: Distended abdomen     EXT: Wounds to the b/l LE     SKIN: Erythematous rash appreciated to the trunk and b/l extremities     NEURO: Nonpurposeful movements

## 2024-06-14 NOTE — PROCEDURE NOTE - ADDITIONAL PROCEDURE DETAILS
Asystolic cardiac arrest  Acute hypoxic respiratory failure  Shock (septic v cardiogenic)   Lactic/respiratory acidosis  OBI  Transaminitis  Hypomagnesemia  Afib  Elevated INR    Date of entry of this note is equal to the date of services rendered.  Procedure performed independent of critical care time.

## 2024-06-14 NOTE — DISCHARGE NOTE PROVIDER - NSDCMRMEDTOKEN_GEN_ALL_CORE_FT
Admelog SoloStar 100 units/mL injectable solution: sliding scale four times daily:   = 0 units  151-200 = 2 units  201-250 = 4 units  251-300 = 6 units  301-350 = 8 units  351-400 = 10 units  &gt; 400 = 12 units &amp; call MS  apixaban 5 mg oral tablet: 1 tab(s) orally 2 times a day  ascorbic acid 1000 mg oral tablet: 1 tab(s) orally once a day  aspirin 81 mg oral tablet, chewable: 1 tab(s) chewed once a day  atorvastatin 40 mg oral tablet: 1 tab(s) orally once a day (at bedtime)  cholecalciferol 1250 mcg (50,000 intl units) oral capsule: 1 cap(s) orally once a month  cranberry oral capsule: 450 milligram(s) orally once a day  DilTIAZem (Eqv-Cardizem CD) 360 mg/24 hours oral capsule, extended release: 1 cap(s) orally once a day  docusate sodium 100 mg oral capsule: 3 cap(s) orally once a day (at bedtime)  ertapenem 1 g injection: 1 gram(s) intravenously once a day  famotidine 20 mg oral tablet: 1 tab(s) orally once a day (in the morning)  ferrous sulfate 325 mg (65 mg elemental iron) oral tablet: 1 tab(s) orally 2 times a day  fexofenadine 60 mg oral tablet: 1 tab(s) orally once a day  gabapentin 300 mg oral capsule: 1 cap(s) orally every 12 hours  insulin glargine 100 units/mL subcutaneous solution: 7 unit(s) subcutaneous once a day (at bedtime)  insulin glargine 100 units/mL subcutaneous solution: 15 unit(s) subcutaneous once a day (in the morning)  levocetirizine 5 mg oral tablet: 1 tab(s) orally once a day (at bedtime)  melatonin 5 mg oral tablet: 1 tab(s) orally once a day (at bedtime)  metFORMIN 1000 mg oral tablet: 1 tab(s) orally 2 times a day  metoclopramide 5 mg oral tablet: 1 tab(s) orally 3 times a day  metoprolol succinate 100 mg oral tablet, extended release: 1 tab(s) orally once a day  Multiple Vitamins oral tablet: 1 tab(s) orally once a day  naloxegol 25 mg oral tablet: 1 tab(s) orally once a day (in the morning)  oxyBUTYnin 10 mg/24 hr oral tablet, extended release: 1 tab(s) orally once a day (at bedtime)  oxyCODONE 10 mg oral tablet: 1 tab(s) orally 2 times a day for pain (MDD: 2 tabs)  pantoprazole 40 mg oral delayed release tablet: 1 tab(s) orally once a day  phenazopyridine 100 mg oral tablet: 1 tab(s) orally 3 times a day as needed  polyethylene glycol 3350 oral kit: 17 gram(s) orally once a day  Potassium Chloride (Eqv-Klor-Con M20) 20 mEq oral tablet, extended release: 1 tab(s) orally 2 times a day  senna (sennosides) 8.6 mg oral tablet: 2 tab(s) orally once a day (at bedtime)  silver sulfADIAZINE 1% topical cream: Apply topically to affected area once a day after soap and water cleanse to L buttock  sodium chloride 0.65% nasal spray: 1 spray(s) intranasally both nostrils once daily in the morning  sucralfate 1 g oral tablet: 1 tab(s) orally once a day before meals  tiotropium-olodaterol 2.5 mcg-2.5 mcg/inh inhalation aerosol: 2 puff(s) inhaled once a day  torsemide 20 mg oral tablet: 1 tab(s) orally 2 times a day  vancomycin 750 mg intravenous injection: 750 milligram(s) intravenously every 12 hours x 36 days   acetaminophen 325 mg oral tablet: 2 tab(s) orally every 6 hours As needed Mild Pain (1 - 3)  apixaban 5 mg oral tablet: 1 tab(s) orally 2 times a day  ascorbic acid 1000 mg oral tablet: 1 tab(s) orally once a day  aspirin 81 mg oral tablet, chewable: 1 tab(s) chewed once a day  atorvastatin 40 mg oral tablet: 1 tab(s) orally once a day (at bedtime)  calamine topical lotion: 1 Apply topically to affected area 2 times a day  cholecalciferol 1250 mcg (50,000 intl units) oral capsule: 1 cap(s) orally once a month  cranberry oral capsule: 450 milligram(s) orally once a day  DilTIAZem (Eqv-Cardizem CD) 360 mg/24 hours oral capsule, extended release: 1 cap(s) orally once a day  docusate sodium 100 mg oral capsule: 3 cap(s) orally once a day (at bedtime)  famotidine 20 mg oral tablet: 1 tab(s) orally once a day (in the morning)  ferrous sulfate 325 mg (65 mg elemental iron) oral tablet: 1 tab(s) orally 2 times a day  fexofenadine 60 mg oral tablet: 1 tab(s) orally once a day  gabapentin 300 mg oral capsule: 1 cap(s) orally every 12 hours  insulin glargine 100 units/mL subcutaneous solution: 7 unit(s) subcutaneous once a day (at bedtime)  insulin glargine 100 units/mL subcutaneous solution: 15 unit(s) subcutaneous once a day (in the morning)  levocetirizine 5 mg oral tablet: 1 tab(s) orally once a day (at bedtime)  melatonin 5 mg oral tablet: 1 tab(s) orally once a day (at bedtime)  metFORMIN 1000 mg oral tablet: 1 tab(s) orally 2 times a day  metoprolol succinate 100 mg oral tablet, extended release: 1 tab(s) orally once a day  Multiple Vitamins oral tablet: 1 tab(s) orally once a day  naloxegol 25 mg oral tablet: 1 tab(s) orally once a day (in the morning)  oxyBUTYnin 10 mg/24 hr oral tablet, extended release: 1 tab(s) orally once a day (at bedtime)  oxyCODONE 10 mg oral tablet: 1 tab(s) orally 2 times a day for pain (MDD: 2 tabs)  pantoprazole 40 mg oral delayed release tablet: 1 tab(s) orally once a day  polyethylene glycol 3350 oral kit: 17 gram(s) orally once a day  Potassium Chloride (Eqv-Klor-Con M20) 20 mEq oral tablet, extended release: 1 tab(s) orally 2 times a day  predniSONE 10 mg oral tablet: 1 tab(s) orally once a day  senna (sennosides) 8.6 mg oral tablet: 2 tab(s) orally once a day (at bedtime)  sodium chloride 0.65% nasal spray: 1 spray(s) intranasally once a day both nostrils once daily in the morning  sucralfate 1 g oral tablet: 1 tab(s) orally once a day before meals  tiotropium-olodaterol 2.5 mcg-2.5 mcg/inh inhalation aerosol: 2 puff(s) inhaled once a day  vancomycin 750 mg intravenous injection: 750 milligram(s) intravenously every 12 hours x 36 days   acetaminophen 325 mg oral tablet: 2 tab(s) orally every 6 hours As needed Mild Pain (1 - 3)  apixaban 5 mg oral tablet: 1 tab(s) orally 2 times a day  ascorbic acid 1000 mg oral tablet: 1 tab(s) orally once a day  aspirin 81 mg oral tablet, chewable: 1 tab(s) chewed once a day  atorvastatin 40 mg oral tablet: 1 tab(s) orally once a day (at bedtime)  calamine topical lotion: 1 Apply topically to affected area 2 times a day  cholecalciferol 1250 mcg (50,000 intl units) oral capsule: 1 cap(s) orally once a month  cranberry oral capsule: 450 milligram(s) orally once a day  DilTIAZem (Eqv-Cardizem CD) 360 mg/24 hours oral capsule, extended release: 1 cap(s) orally once a day  docusate sodium 100 mg oral capsule: 3 cap(s) orally once a day (at bedtime)  famotidine 20 mg oral tablet: 1 tab(s) orally once a day (in the morning)  ferrous sulfate 325 mg (65 mg elemental iron) oral tablet: 1 tab(s) orally 2 times a day  fexofenadine 60 mg oral tablet: 1 tab(s) orally once a day  gabapentin 100 mg oral capsule: 1 cap(s) orally every 12 hours  levocetirizine 5 mg oral tablet: 1 tab(s) orally once a day (at bedtime)  melatonin 5 mg oral tablet: 1 tab(s) orally once a day (at bedtime)  metFORMIN 1000 mg oral tablet: 1 tab(s) orally 2 times a day  metoprolol succinate 100 mg oral tablet, extended release: 1 tab(s) orally every 12 hours  Multiple Vitamins oral tablet: 1 tab(s) orally once a day  naloxegol 25 mg oral tablet: 1 tab(s) orally once a day (in the morning)  oxyBUTYnin 10 mg/24 hr oral tablet, extended release: 1 tab(s) orally once a day (at bedtime)  oxyCODONE 10 mg oral tablet: 1 tab(s) orally 2 times a day for pain (MDD: 2 tabs)  pantoprazole 40 mg oral delayed release tablet: 1 tab(s) orally once a day  polyethylene glycol 3350 oral kit: 17 gram(s) orally once a day  Potassium Chloride (Eqv-Klor-Con M20) 20 mEq oral tablet, extended release: 1 tab(s) orally 2 times a day  predniSONE 10 mg oral tablet: 1 tab(s) orally once a day  Regular Insulin Sliding Scale: Regular insulin sliding  scale  senna (sennosides) 8.6 mg oral tablet: 2 tab(s) orally once a day (at bedtime)  sodium chloride 0.65% nasal spray: 1 spray(s) intranasally once a day both nostrils once daily in the morning  sucralfate 1 g oral tablet: 1 tab(s) orally once a day before meals  tiotropium-olodaterol 2.5 mcg-2.5 mcg/inh inhalation aerosol: 2 puff(s) inhaled once a day  vancomycin 1.25 g intravenous injection: 1.25 gram(s) intravenous every 24 hours last day 8/17/24

## 2024-06-14 NOTE — PATIENT PROFILE ADULT - FUNCTIONAL ASSESSMENT - BASIC MOBILITY 6.
1-calculated by average/Not able to assess (calculate score using [facility] averaging method)

## 2024-06-14 NOTE — PROGRESS NOTE ADULT - SUBJECTIVE AND OBJECTIVE BOX
Date of Service 06-14-24 @ 15:46    Patient is a 53y old  Male who presents with a chief complaint of Acute respiratory failure with hypoxia     (14 Jun 2024 11:06)      INTERVAL /OVERNIGHT EVENTS: sedated and on pressor support    MEDICATIONS  (STANDING):  chlorhexidine 0.12% Liquid 15 milliLiter(s) Oral Mucosa every 12 hours  chlorhexidine 2% Cloths 1 Application(s) Topical daily  dextrose 10% Bolus 125 milliLiter(s) IV Bolus once  dextrose 5%. 1000 milliLiter(s) (100 mL/Hr) IV Continuous <Continuous>  dextrose 5%. 1000 milliLiter(s) (50 mL/Hr) IV Continuous <Continuous>  dextrose 50% Injectable 25 Gram(s) IV Push once  dextrose 50% Injectable 12.5 Gram(s) IV Push once  glucagon  Injectable 1 milliGRAM(s) IntraMuscular once  insulin regular Infusion 10 Unit(s)/Hr (10 mL/Hr) IV Continuous <Continuous>  ketamine Infusion. 0.25 mG/kG/Hr (2.53 mL/Hr) IV Continuous <Continuous>  naloxegol 25 milliGRAM(s) Oral daily  norepinephrine Infusion 0.35 MICROgram(s)/kG/Min (33.2 mL/Hr) IV Continuous <Continuous>  pantoprazole  Injectable 40 milliGRAM(s) IV Push daily  polyethylene glycol 3350 17 Gram(s) Oral daily  propofol Infusion 5 MICROgram(s)/kG/Min (2.45 mL/Hr) IV Continuous <Continuous>  senna 2 Tablet(s) Oral at bedtime  sodium bicarbonate  Infusion 0.074 mEq/kG/Hr (50 mL/Hr) IV Continuous <Continuous>  vasopressin Infusion 0.04 Unit(s)/Min (6 mL/Hr) IV Continuous <Continuous>    MEDICATIONS  (PRN):  dextrose Oral Gel 15 Gram(s) Oral once PRN Blood Glucose LESS THAN 70 milliGRAM(s)/deciliter  sodium chloride 0.9% lock flush 10 milliLiter(s) IV Push every 1 hour PRN Pre/post blood products, medications, blood draw, and to maintain line patency      Allergies    ertapenem (Blisters; Rash)  fish (Hives)    Intolerances        REVIEW OF SYSTEMS:  unable to obtain    Vital Signs Last 24 Hrs  T(C): 37.2 (14 Jun 2024 12:07), Max: 37.9 (14 Jun 2024 04:39)  T(F): 98.9 (14 Jun 2024 12:07), Max: 100.2 (14 Jun 2024 04:39)  HR: 149 (14 Jun 2024 15:20) (51 - 156)  BP: 114/69 (14 Jun 2024 15:20) (79/52 - 120/85)  BP(mean): 86 (14 Jun 2024 15:20) (61 - 95)  RR: 20 (14 Jun 2024 15:20) (14 - 22)  SpO2: 100% (14 Jun 2024 15:20) (96% - 100%)    Parameters below as of 14 Jun 2024 06:00  Patient On (Oxygen Delivery Method): ventilator    O2 Concentration (%): 40    PHYSICAL EXAM:  GENERAL: intubated  HEAD:  Atraumatic, Normocephalic  EYES: EOMI, PERRLA, conjunctiva and sclera clear  ENMT: No tonsillar erythema, exudates, or enlargement; Moist mucous membranes, Good dentition, No lesions  NECK: Supple, No JVD, Normal thyroid  NERVOUS SYSTEM: sedated  CHEST/LUNG: Clear to auscultation bilaterally; No rales, rhonchi, wheezing, or rubs  HEART: Regular rate and rhythm; No murmurs, rubs, or gallops  ABDOMEN: Soft, Nontender, Nondistended; Bowel sounds present  EXTREMITIES:  2+ Peripheral Pulses, No clubbing, cyanosis, or edema  LYMPH: No lymphadenopathy noted  SKIN: No rashes or lesions    LABS:                        13.7   34.23 )-----------( 446      ( 14 Jun 2024 04:30 )             45.0     14 Jun 2024 13:37    139    |  106    |  50     ----------------------------<  371    3.8     |  19     |  3.00     Ca    8.0        14 Jun 2024 13:37  Phos  0.8       14 Jun 2024 10:05  Mg     1.9       14 Jun 2024 10:05    TPro  6.8    /  Alb  2.7    /  TBili  0.7    /  DBili  x      /  AST  385    /  ALT  435    /  AlkPhos  152    14 Jun 2024 04:30    PT/INR - ( 14 Jun 2024 04:30 )   PT: 26.6 sec;   INR: 2.33 ratio         PTT - ( 14 Jun 2024 04:30 )  PTT:42.6 sec  Urinalysis Basic - ( 14 Jun 2024 13:37 )    Color: x / Appearance: x / SG: x / pH: x  Gluc: 371 mg/dL / Ketone: x  / Bili: x / Urobili: x   Blood: x / Protein: x / Nitrite: x   Leuk Esterase: x / RBC: x / WBC x   Sq Epi: x / Non Sq Epi: x / Bacteria: x      CAPILLARY BLOOD GLUCOSE      POCT Blood Glucose.: 303 mg/dL (14 Jun 2024 13:57)  POCT Blood Glucose.: 335 mg/dL (14 Jun 2024 12:52)  POCT Blood Glucose.: 363 mg/dL (14 Jun 2024 11:54)  POCT Blood Glucose.: 396 mg/dL (14 Jun 2024 10:21)  POCT Blood Glucose.: 415 mg/dL (14 Jun 2024 09:20)  POCT Blood Glucose.: 440 mg/dL (14 Jun 2024 08:18)  POCT Blood Glucose.: 415 mg/dL (14 Jun 2024 06:36)  POCT Blood Glucose.: 430 mg/dL (14 Jun 2024 03:07)      RADIOLOGY & ADDITIONAL TESTS:    Notes Reviewed:  [x ] YES  [ ] NO    Care Discussed with Consultants/Other Providers [x ] YES  [ ] NO

## 2024-06-14 NOTE — DIETITIAN INITIAL EVALUATION ADULT - NSPROEDAREADYLEARN_GEN_A_NUR
PT BIBA from [facility] for feeling malaise and "not herself" today. Pt also having decreased appetite.
not appropriate for education at this time

## 2024-06-14 NOTE — DIETITIAN INITIAL EVALUATION ADULT - OTHER INFO
"53 year old male with a PMH of AF on Eliquis, indwelling jacques, CAD s/p stents, CVA, DM, HTN, hx PE, recent hospitalization discharged yesterday after an admission for R heel osteomyelitis w/ debridement who presented to the ED from [facility] for difficulty breathing.    Problem list:  Asystolic cardiac arrest  Acute hypoxic respiratory failure  Shock (septic v cardiogenic)   Lactic/respiratory acidosis  OBI  Transaminitis  Hypomagnesemia  Afib  Elevated INR" "53 year old male with a PMH of AF on Eliquis, indwelling jacques, CAD s/p stents, CVA, DM, HTN, hx PE, recent hospitalization discharged yesterday after an admission for R heel osteomyelitis w/ debridement who presented to the ED from [facility] for difficulty breathing."  Problem list:  Asystolic cardiac arrest  Acute hypoxic respiratory failure  Shock (septic v cardiogenic)   Lactic/respiratory acidosis, OBI, transaminitis, Hypomagnesemia, Afib, elevated INR  NGT to LWS draining 300cc of brownish/red fluid  Propofol dosing at current rate 12.1 cc/hr providing 320 kcal

## 2024-06-14 NOTE — CONSULT NOTE ADULT - SUBJECTIVE AND OBJECTIVE BOX
SURGERY PA CONSULT NOTE:    CHIEF COMPLAINT:  Patient is a 53y old  Male who presents with a chief complaint of     HPI:  53yMale with PMHx afib,T2DM, neuropathy, HTN, CAD s/p 2 stents, CVA, cerebrl aneurysm, NSTEMI, perf'd gastric ulcer s/p ometnopexy 2019 with Dr. Mejia, PE, OM, recent admission 5/29-6/13 for R heel wound s/p debridement on 6/4 cx positive for Ecoli and E faecalis, a/w AHRF. Intubated in the ED, ACLS initiated, ROSC attained, patient now admitted to the ICU currently intubated, sedated, on levophed and epinephrine, NGT placed in the ED. Surgery consulted for possible ileus vs early SBO. Unable to attain ROS 2/2 sedation.    PAST MEDICAL & SURGICAL HISTORY:  Diabetes      Diabetes mellitus with no complication      Afib      Hypertension      BPH (benign prostatic hyperplasia)      Perforated gastric ulcer  s/p emergent ex-lap omentopexy and plication 6/2019      Pulmonary embolism      History of non-ST elevation myocardial infarction (NSTEMI)      Osteomyelitis  s/p debridement      CAD S/P percutaneous coronary angioplasty      Cerebrovascular accident      H/O abdominal surgery      Perforated gastric ulcer      Traumatic amputation of left foot, initial encounter      REVIEW OF SYSTEMS:  CONSTITUTIONAL: No weakness, fevers or chills, no weight loss  EYES/ENT: No visual changes;  No vertigo or throat pain   NECK: No pain or stiffness  ENDOCRINE: No thyroid problems  RESPIRATORY: No cough, wheezing, hemoptysis; No shortness of breath  CARDIOVASCULAR: No chest pain or palpitations  GASTROINTESTINAL: No abdominal or epigastric pain. No nausea, vomiting, or hematemesis; No diarrhea or constipation. No melena or hematochezia.  GENITOURINARY: No dysuria, frequency or hematuria  MSK: No joint swelling  NEUROLOGICAL: No numbness or weakness  SKIN: No itching, burning, rashes, or lesions   All other review of systems is negative unless indicated above.    MEDICATIONS:  Home Medications:  acetaminophen 325 mg oral tablet: 2 tab(s) orally every 6 hours As needed Temp greater or equal to 38C (100.4F), Mild Pain (1 - 3) (07 Jun 2024 20:10)  Allegra 60 mg oral tablet: 1 tab(s) orally once a day (28 May 2024 13:50)  apixaban 5 mg oral tablet: 1 tab(s) orally 2 times a day (28 May 2024 13:50)  ascorbic acid 1000 mg oral tablet: 1 tab(s) orally once a day (28 May 2024 13:50)  aspirin 81 mg oral tablet, chewable: 1 tab(s) chewed once a day (28 May 2024 13:50)  atorvastatin 40 mg oral tablet: 1 tab(s) orally once a day (28 May 2024 13:50)  cholecalciferol 1250 mcg (50,000 intl units) oral capsule: 1 cap(s) orally once a month on 15th (28 May 2024 13:50)  cranberry oral capsule: 450 milligram(s) orally once a day (28 May 2024 13:50)  Deep Sea Nasal 0.65% nasal spray: 2 spray(s) in each nostril once a day (28 May 2024 13:50)  DilTIAZem (Eqv-Cardizem CD) 360 mg/24 hours oral capsule, extended release: 1 cap(s) orally once a day (28 May 2024 13:50)  diphenhydrAMINE 25 mg oral capsule: 1 cap(s) orally every 4 hours As needed Rash and/or Itching (07 Jun 2024 20:10)  docusate sodium 100 mg oral capsule: 3 cap(s) orally once a day (28 May 2024 13:50)  ertapenem 1 g injection: 1 gram(s) injectable once a day (07 Jun 2024 20:10)  famotidine 20 mg oral tablet: 1 tab(s) orally once a day (28 May 2024 13:50)  ferrous sulfate 325 mg (65 mg elemental iron) oral tablet: 1 tab(s) orally 2 times a day (28 May 2024 13:50)  gabapentin 300 mg oral capsule: 1 cap(s) orally every 12 hours (28 May 2024 13:50)  HumuLIN R 100 units/mL injectable solution: sliding scale (28 May 2024 13:50)  insulin glargine 100 units/mL subcutaneous solution: 15 unit(s) subcutaneous once a day 09:00am (28 May 2024 13:50)  insulin glargine 100 units/mL subcutaneous solution: 7 unit(s) subcutaneous once a day (at bedtime) 21:00pm (28 May 2024 13:50)  levocetirizine 5 mg oral tablet: 1 tab(s) orally once a day (at bedtime) (28 May 2024 13:50)  Medrol Dosepak 4 mg oral tablet: 1 packet(s) orally once take as directed on packet (13 Jun 2024 09:36)  melatonin 5 mg oral tablet: 1 tab(s) orally once a day (at bedtime) (28 May 2024 13:50)  metFORMIN 1000 mg oral tablet: 1 tab(s) orally 2 times a day (28 May 2024 13:50)  metoclopramide 5 mg oral tablet: 1 tab(s) orally 3 times a day (28 May 2024 13:50)  metoprolol succinate 100 mg oral tablet, extended release: 1 tab(s) orally once a day (07 Jun 2024 20:10)  Multiple Vitamins with Minerals oral tablet: 1 tab(s) orally once a day (28 May 2024 13:50)  naloxegol 25 mg oral tablet: 1 tab(s) orally once a day (28 May 2024 13:50)  ondansetron 2 mg/mL injectable solution: 4 milligram(s) intravenously every 8 hours as needed for  nausea (28 May 2024 13:50)  oxyBUTYnin 10 mg/24 hr oral tablet, extended release: 1 tab(s) orally once a day (28 May 2024 13:50)  oxyCODONE 10 mg oral tablet: 1 tab(s) orally 2 times a day (28 May 2024 13:50)  pantoprazole 40 mg oral delayed release tablet: 1 tab(s) orally once a day (28 May 2024 13:50)  polyethylene glycol 3350 oral powder for reconstitution: 17 gram(s) orally 2 times a day (28 May 2024 13:50)  Potassium Chloride (Eqv-Klor-Con M20) 20 mEq oral tablet, extended release: 1 tab(s) orally 2 times a day (28 May 2024 13:50)  Pyridium 100 mg oral tablet: 1 tab(s) orally 3 times a day as needed (28 May 2024 13:50)  senna (sennosides) 8.6 mg oral tablet: 2 tab(s) orally once a day (at bedtime) (28 May 2024 13:50)  sucralfate 1 g oral tablet: 1 tab(s) orally once a day (28 May 2024 13:50)  tiotropium 2.5 mcg/inh inhalation aerosol: 2 puff(s) inhaled once a day (28 May 2024 13:50)  torsemide 20 mg oral tablet: 1 tab(s) orally 2 times a day (28 May 2024 13:50)  triamcinolone 0.025% topical cream: Apply topically to affected area 2 times a day (28 May 2024 13:50)  vancomycin 750 mg intravenous injection: 750 milligram(s) intravenous every 12 hours (11 Jun 2024 21:55)    MEDICATIONS  (STANDING):  chlorhexidine 0.12% Liquid 15 milliLiter(s) Oral Mucosa every 12 hours  chlorhexidine 4% Liquid 1 Application(s) Topical <User Schedule>  dextrose 10% Bolus 125 milliLiter(s) IV Bolus once  dextrose 5%. 1000 milliLiter(s) (100 mL/Hr) IV Continuous <Continuous>  dextrose 5%. 1000 milliLiter(s) (50 mL/Hr) IV Continuous <Continuous>  dextrose 50% Injectable 25 Gram(s) IV Push once  dextrose 50% Injectable 12.5 Gram(s) IV Push once  EPINEPHrine    Infusion 0.05 MICROgram(s)/kG/Min (15.3 mL/Hr) IV Continuous <Continuous>  glucagon  Injectable 1 milliGRAM(s) IntraMuscular once  insulin regular Infusion 10 Unit(s)/Hr (10 mL/Hr) IV Continuous <Continuous>  lactated ringers. 1000 milliLiter(s) (100 mL/Hr) IV Continuous <Continuous>  norepinephrine Infusion 0.05 MICROgram(s)/kG/Min (9.5 mL/Hr) IV Continuous <Continuous>  pantoprazole  Injectable 40 milliGRAM(s) IV Push daily  propofol Infusion 5 MICROgram(s)/kG/Min (2.45 mL/Hr) IV Continuous <Continuous>  sodium bicarbonate  Infusion 0.074 mEq/kG/Hr (50 mL/Hr) IV Continuous <Continuous>    MEDICATIONS  (PRN):  dextrose Oral Gel 15 Gram(s) Oral once PRN Blood Glucose LESS THAN 70 milliGRAM(s)/deciliter  sodium chloride 0.9% lock flush 10 milliLiter(s) IV Push every 1 hour PRN Pre/post blood products, medications, blood draw, and to maintain line patency      ALLERGIES:  Allergies    ertapenem (Blisters; Rash)  fish (Hives)    Intolerances    FAMILY HISTORY:  FH: pulmonary embolism  Mother    FH: coronary artery disease  Father    FH: stroke  Father        PHYSICAL EXAM:  Vital Signs Last 24 Hrs  T(C): 37.9 (14 Jun 2024 04:39), Max: 37.9 (14 Jun 2024 04:39)  T(F): 100.2 (14 Jun 2024 04:39), Max: 100.2 (14 Jun 2024 04:39)  HR: 97 (14 Jun 2024 07:00) (51 - 107)  BP: 98/55 (14 Jun 2024 07:00) (79/52 - 120/85)  BP(mean): 70 (14 Jun 2024 07:00) (61 - 95)  RR: 16 (14 Jun 2024 07:00) (14 - 22)  SpO2: 100% (14 Jun 2024 07:00) (92% - 100%)    Parameters below as of 14 Jun 2024 01:00  Patient On (Oxygen Delivery Method): ventilator    O2 Concentration (%): 60    CONSTITUTIONAL: intubated sedated  HEAD:  Atraumatic, normocephalic  EYES: eyes closed  ENMT: Oral mucosa with moist membranes  NECK: Supple, symmetric and without tracheal deviation   RESP: vented, bilateral chest rise equal  CV: a fib on monitor, on levophed and epinephrine   GI: Softly distended, tympanic no grimacing with palpation, midline scar from previous surgery  EXT: R PICC   PSYCH: sleeping  NEUROLOGY: sedated  SKIN: oozing blisters of the R arm    LABS:                        13.7   34.23 )-----------( 446      ( 14 Jun 2024 04:30 )             45.0     06-14    140  |  103  |  47<H>  ----------------------------<  469<HH>  3.8   |  14<L>  |  3.10<H>    Ca    8.4<L>      14 Jun 2024 04:30  Phos  2.8     06-14  Mg     2.2     06-14    TPro  6.8  /  Alb  2.7<L>  /  TBili  0.7  /  DBili  x   /  AST  385<H>  /  ALT  435<H>  /  AlkPhos  152<H>  06-14    PT/INR - ( 14 Jun 2024 04:30 )   PT: 26.6 sec;   INR: 2.33 ratio         PTT - ( 14 Jun 2024 04:30 )  PTT:42.6 sec  Lactate, Blood: 12.9 mmol/L (06-14 @ 04:30)  Lactate, Blood: 12.8 mmol/L (06-14 @ 01:25)  Lactate, Blood: 13.8 mmol/L (06-13 @ 22:55)    Urinalysis Basic - ( 14 Jun 2024 04:30 )    Color: x / Appearance: x / SG: x / pH: x  Gluc: 469 mg/dL / Ketone: x  / Bili: x / Urobili: x   Blood: x / Protein: x / Nitrite: x   Leuk Esterase: x / RBC: x / WBC x   Sq Epi: x / Non Sq Epi: x / Bacteria: x      LIVER FUNCTIONS - ( 14 Jun 2024 04:30 )  Alb: 2.7 g/dL / Pro: 6.8 g/dL / ALK PHOS: 152 U/L / ALT: 435 U/L / AST: 385 U/L / GGT: x               RADIOLOGY & ADDITIONAL STUDIES:  < from: CT Chest No Cont (06.14.24 @ 00:51) >  ******PRELIMINARY REPORT******      ******PRELIMINARY REPORT******         ACC: [id number] EXAM:  CT ABDOMEN AND PELVIS   ORDERED BY:  BRIEN MARTINEZ     ACC: [id number] EXAM:  CT CHEST   ORDERED BY:  BRIEN MARTINEZ     PROCEDURE DATE:  06/14/2024    ******PRELIMINARY REPORT******      ******PRELIMINARY REPORT******           INTERPRETATION:  CLINICAL INFORMATION:    COMPARISON: None.    CONTRAST/COMPLICATIONS:  IV Contrast: NONE  Oral Contrast: NONE  Complications: None reported at time ofstudy completion    Preliminary  impression: Motion degraded exam. Emphysema. Mildly dilated loops of   small bowel with gradual transition, most likely reflecting ileus.   Early/developing obstruction not fully excluded.          ******PRELIMINARY REPORT******      ******PRELIMINARY REPORT******         RHINA SORENSON MD; Attending Radiologist  This document is a PRELIMINARY interpretation and is pending final   attending approval. Jun 14 2024  4:48AM    < end of copied text >    ASSESSMENT:  53yMale with PMHx afib,T2DM, neuropathy, HTN, CAD s/p 2 stents, CVA, cerebrl aneurysm, NSTEMI, perf'd gastric ulcer s/p ometnopexy 2019 with Dr. Mejia, PE, OM, recent admission 5/29-6/13 for R heel wound s/p debridement on 6/4 cx positive for Ecoli and E faecalis, a/w AHRF admitted to the ICU currently intubated, sedated, on levophed and epinephrine, NGT placed in the ED. Surgery consulted for possible ileus vs early SBO.        PLAN:  - Continue NGT to LCWS  - Strict Is and Os  - Protonix for ppx  - Rest of care per ICU     Discussed with Dr. Wilson

## 2024-06-14 NOTE — CONSULT NOTE ADULT - SUBJECTIVE AND OBJECTIVE BOX
[facility] DIV[facility] of INFECTIOUS DISEASE  Juventino Whitten MD PhD, Symone Hickey MD, Anne-Marie Li MD, Jeffery Jacobs MD, Ryan Romeo MD  and providing coverage with Melody Armstrong MD  Providing Infectious Disease Consultations at [facility], [facility], [place], [place], [facility], [facility]'s    Office# [phone] to schedule follow up appointments  Answering Service for urgent calls or New Consults [phone]  Cell# to text for urgent issues Juventino Whitten [phone]     HPI:  Claude Villareal is a 52 YO M with  AF on Eliquis, indwelling Aguilera, CAD s/p stents, CVA, DMT 2, Hypertension, osteomyelitis s/p debridement, PE, transferred from [facility] for difficulty in breathing.  His recent admission was for osteomyelitis and discharged on IV Vancomycin. Reported at [facility] he was given a dose of ertapenem  and developed difficulty breathing followed by apnea. On ED arrival he was unresponsive, apneic, no palpable pulse or blood pressure, EKG rhythm HR < 20/min W/O P waves. Patient intubated, Code ACLS activated, IV epinephrine, IV atropine administered, pulse became palpable, still Hypotensive, IV levophed administered. Admitted to ICU consulted,  Patient woke up and agitated try to pull out ETT,  IV propofol started.      PAST MEDICAL & SURGICAL HISTORY:  Diabetes      Diabetes mellitus with no complication      Afib      Hypertension      BPH (benign prostatic hyperplasia)      Perforated gastric ulcer  s/p emergent ex-lap omentopexy and plication 6/2019      Pulmonary embolism      History of non-ST elevation myocardial infarction (NSTEMI)      Osteomyelitis  s/p debridement      CAD S/P percutaneous coronary angioplasty      Cerebrovascular accident      H/O abdominal surgery      Perforated gastric ulcer      Traumatic amputation of left foot, initial encounter          Antimicrobials      Immunological      Other  chlorhexidine 0.12% Liquid 15 milliLiter(s) Oral Mucosa every 12 hours  chlorhexidine 2% Cloths 1 Application(s) Topical daily  dextrose 10% Bolus 125 milliLiter(s) IV Bolus once  dextrose 5%. 1000 milliLiter(s) IV Continuous <Continuous>  dextrose 5%. 1000 milliLiter(s) IV Continuous <Continuous>  dextrose 50% Injectable 25 Gram(s) IV Push once  dextrose 50% Injectable 12.5 Gram(s) IV Push once  dextrose Oral Gel 15 Gram(s) Oral once PRN  EPINEPHrine    Infusion 0.05 MICROgram(s)/kG/Min IV Continuous <Continuous>  glucagon  Injectable 1 milliGRAM(s) IntraMuscular once  insulin regular Infusion 10 Unit(s)/Hr IV Continuous <Continuous>  ketamine Infusion. 0.25 mG/kG/Hr IV Continuous <Continuous>  naloxegol 25 milliGRAM(s) Oral daily  norepinephrine Infusion 0.05 MICROgram(s)/kG/Min IV Continuous <Continuous>  pantoprazole  Injectable 40 milliGRAM(s) IV Push daily  polyethylene glycol 3350 17 Gram(s) Oral daily  potassium chloride  10 mEq/100 mL IVPB 10 milliEquivalent(s) IV Intermittent every 1 hour  propofol Infusion 5 MICROgram(s)/kG/Min IV Continuous <Continuous>  senna 2 Tablet(s) Oral at bedtime  sodium bicarbonate  Infusion 0.074 mEq/kG/Hr IV Continuous <Continuous>  sodium chloride 0.9% lock flush 10 milliLiter(s) IV Push every 1 hour PRN      Allergies    ertapenem (Blisters; Rash)  fish (Hives)    Intolerances        SOCIAL HISTORY:  no toxic habits reported      FAMILY HISTORY:  FH: pulmonary embolism  Mother    FH: coronary artery disease  Father    FH: stroke  Father        ROS:  limited by cognitive status    Vital Signs Last 24 Hrs  T(C): 37.9 (14 Jun 2024 07:59), Max: 37.9 (14 Jun 2024 04:39)  T(F): 100.2 (14 Jun 2024 07:59), Max: 100.2 (14 Jun 2024 04:39)  HR: 123 (14 Jun 2024 09:30) (51 - 123)  BP: 88/53 (14 Jun 2024 09:30) (79/52 - 120/85)  BP(mean): 65 (14 Jun 2024 09:30) (61 - 95)  RR: 16 (14 Jun 2024 09:30) (14 - 22)  SpO2: 100% (14 Jun 2024 09:30) (92% - 100%)    Parameters below as of 14 Jun 2024 06:00  Patient On (Oxygen Delivery Method): ventilator    O2 Concentration (%): 40    PE:  Intubated, on vent  HEENT:  NC, PERRL, sclerae anicteric, conjunctivae clear, EOMI.  Sinuses nontender, no nasal exudate.  No buccal or pharyngeal lesions, erythema or exudate  Neck:  Supple, no adenopathy  Lungs:  No accessory muscle use, bilaterally clear to auscultation  Cor:  distant  Abd:  Symmetric, normoactive BS.  Soft, nontender, no masses, guarding or rebound.  Liver and spleen not enlarged  Extrem:  No cyanosis or edema, sp amputations, wrapped heals  Skin:  noted rash with few bullae mostly scabbed or healing  Neuro: limited, sedated    Mode: AC/ CMV (Assist Control/ Continuous Mandatory Ventilation), RR (machine): 18, TV (machine): 450, FiO2: 30, PEEP: 5, ITime: 1, MAP: 13, PIP: 24    LABS:                        13.7   34.23 )-----------( 446      ( 14 Jun 2024 04:30 )             45.0       WBC Count: 34.23 K/uL (06-14-24 @ 04:30)  WBC Count: 23.52 K/uL (06-13-24 @ 22:55)  WBC Count: 12.49 K/uL (06-11-24 @ 14:25)  WBC Count: 12.51 K/uL (06-10-24 @ 06:42)  WBC Count: 11.16 K/uL (06-08-24 @ 06:50)  WBC Count: 12.07 K/uL (06-07-24 @ 12:50)      06-14    139  |  103  |  51<H>  ----------------------------<  x   3.0<L>   |  14<L>  |  3.50<H>    Ca    8.1<L>      14 Jun 2024 10:05  Phos  2.8     06-14  Mg     1.9     06-14    TPro  6.8  /  Alb  2.7<L>  /  TBili  0.7  /  DBili  x   /  AST  385<H>  /  ALT  435<H>  /  AlkPhos  152<H>  06-14      Creatinine: 3.50 mg/dL (06-14-24 @ 10:05)  Creatinine: 3.10 mg/dL (06-14-24 @ 04:30)  Creatinine: 1.70 mg/dL (06-13-24 @ 23:55)  Creatinine: 1.30 mg/dL (06-10-24 @ 06:42)      Urinalysis Basic - ( 14 Jun 2024 04:30 )    Color: x / Appearance: x / SG: x / pH: x  Gluc: 469 mg/dL / Ketone: x  / Bili: x / Urobili: x   Blood: x / Protein: x / Nitrite: x   Leuk Esterase: x / RBC: x / WBC x   Sq Epi: x / Non Sq Epi: x / Bacteria: x        Vancomycin Level, Trough: 17.3 ug/mL (06-13-24 @ 05:54)  Vancomycin Level, Trough: 15.7 ug/mL (06-11-24 @ 04:30)  Vancomycin Level, Trough: 14.4 ug/mL (06-09-24 @ 04:58)  Vancomycin Level, Trough: 14.0 ug/mL (06-06-24 @ 20:20)  Vancomycin Level, Trough: 14.0 ug/mL (06-05-24 @ 05:47)  Vancomycin Level, Trough: 22.0 ug/mL (06-04-24 @ 19:31)        MICROBIOLOGY:      RADIOLOGY & ADDITIONAL STUDIES:    --

## 2024-06-14 NOTE — PROGRESS NOTE ADULT - ASSESSMENT
53 year old male with a PMH of AF on Eliquis, indwelling jacques, CAD s/p stents, CVA, DM, HTN, hx PE, recent hospitalization discharged yesterday after an admission for R heel osteomyelitis w/ debridement who presented to the ED from [facility] for difficulty breathing.    Problem list:  Asystolic cardiac arrest  Acute hypoxic respiratory failure  Shock (septic v cardiogenic)   Lactic/respiratory acidosis  OBI  Transaminitis  Hypomagnesemia  Afib  Elevated INR    Neuro: Acute metabolic encephalopathy 2/2 shock  -Transition to ketamine from propofol for sedation for patient comfort.    -Wean as tolerated     CV: Cardiogenic shock 2/2 bradycardia vs distributive shock 2/2 anaphylactic reaction to ertapenem.   -Per medication review w/ [facility] eMAR, patient received total cardizem ER 720mg and Toprol XL 200mg in last 24 hrs  --> ?cardiogenic shock 2/2 bradycardia  -Calcium gluconate and glucagon for BB overdose  -Also, per chart review patient had rash with blisters after receiving ertapenem during previous hospitalization. Received ertapenem at [facility] s/p discharge. ?distributive shock 2/2 anaphylaxis  - C/w transition levophed to epi gtt for increased chronotropy given bradycardia and asystolic arrests.  Holding home CCB and BB given this.    - EKG on arrival with slow afib but no signs of ischemia.  Initial trop wnl, repeat elevated 114.3 , trend to peak Goal MAP >65.  Echo from March w/ normal LVEF.    - F/u repeat EKG and TTE  - Cardiology following    Pulm: Acute hypoxic respiratory failure /resp acidosis  - C/w w/ full vent support.   - ABGs improving, most recent significant for metabolic acidosis  - c/w bicarb gtt  - SBTs as able.     GI:   - NPO. given multiple dilated bowel loops on CT  - CT AP: < from: CT Abdomen and Pelvis No Cont (06.14.24 @ 00:50) >  Numerous mildly distended gas-filled and fluid-filled small bowel loops   with air-fluid levels and no transition point involving the entire small   bowel is most suggestive of ileus. o ascites or pneumoperitoneum. Emphysema.    - NGT to suction  - previously on opiod medication; placed on bowel regimen  - c/w ppi for GI prophylaxis.   - Transaminitis likely 2/2 shock liver ; f/u CMP    Renal: OBI likely 2/2 ATN from cardiac arrest vs prerenal   - Cr trending up, most recent 3.5 ; on arrival to ED Cr. 1.70  - lactate trending up ; most recent 15.0  - Profound lactic acidosis likely from cardiac arrest   - Given LR bolus x1 this AM  -Will initiate IVF hydration with LR.    - hypokalemia and hypophosphatemia today; replete PRN  - Repleting electrolytes for K>4, Mg>2.       ID: ongoing treatment for OM s/p R heel debridement during prior hospitalization  - Was receiving ertapenem and vancomycin, however as per prior ID notes ertapenem was stopped due to development of rash.    - Wound/tissue cultures from prior admission with MRSA, ESBL E.coli, E faecalis.    - c/w vanc ; most recent vanc level 16.1 ; f/u AM vanc level  - Jacques exchanged on arrival to ICU.  Will send UA/ucx.  F/u blood cx.   - ID following  - Jacques exchanged on arrival to ICU.  Will send UA/ucx.  F/u blood cx.     Endocrine: Hyperglycemic.  ISS q6 hrs.  Will hold lantus while NPO.  TSH elevated.  Sending T3/T4.  Did not see levothyroxine in med list.     Heme: On Eliquis as an outpatient.  Holding for now pending CT results and given elevated INR.  SCDs for DVT prophylaxis.     [facility] conversation documented 6/13 confirmed patient full code.  Attempted to reach patient's emergency contact at the number listed in chart but the call did not go through x2.   53 year old male with a PMH of AF on Eliquis, indwelling jacques, CAD s/p stents, CVA, DM, HTN, hx PE, recent hospitalization discharged yesterday after an admission for R heel osteomyelitis w/ debridement who presented to the ED from [facility] for difficulty breathing.    Problem list:  Asystolic cardiac arrest  Acute hypoxic respiratory failure  Shock (septic v cardiogenic)   Lactic/respiratory acidosis  OBI  Transaminitis  Hypomagnesemia  Afib  Elevated INR    Neuro: Acute metabolic encephalopathy 2/2 shock  -Transition to ketamine from propofol for sedation for patient comfort.    -Wean as tolerated     CV: Cardiogenic shock 2/2 bradycardia vs distributive shock 2/2 anaphylactic reaction to ertapenem.   -Per medication review w/ [facility] eMAR, patient received total cardizem ER 720mg and Toprol XL 200mg in last 24 hrs  --> ?cardiogenic shock 2/2 bradycardia  -Calcium gluconate and glucagon for BB overdose  -Also, per chart review patient had rash with blisters after receiving ertapenem during previous hospitalization. Received ertapenem at [facility] s/p discharge. ?distributive shock 2/2 anaphylaxis  - C/w transition levophed to epi gtt for increased chronotropy given bradycardia and asystolic arrests.  Holding home CCB and BB given this.    - EKG on arrival with slow afib but no signs of ischemia.  Initial trop wnl, repeat elevated 114.3 , trend to peak Goal MAP >65.  Echo from March w/ normal LVEF.    - F/u repeat EKG and TTE  - Cardiology following    Pulm: Acute hypoxic respiratory failure /resp acidosis  - C/w w/ full vent support.   - ABGs improving, most recent significant for metabolic acidosis  - c/w bicarb gtt  - SBTs as able.     GI:   - NPO. given multiple dilated bowel loops on CT  - CT AP: < from: CT Abdomen and Pelvis No Cont (06.14.24 @ 00:50) >  Numerous mildly distended gas-filled and fluid-filled small bowel loops   with air-fluid levels and no transition point involving the entire small   bowel is most suggestive of ileus. o ascites or pneumoperitoneum. Emphysema.    - NGT to suction  - previously on opiod medication; placed on bowel regimen  - c/w ppi for GI prophylaxis.   - Transaminitis likely 2/2 shock liver ; f/u CMP    Renal: OBI likely 2/2 ATN from cardiac arrest vs prerenal   - Cr trending up, most recent 3.5 ; on arrival to ED Cr. 1.70  - lactate trending up ; most recent 15.0  - Profound lactic acidosis likely from cardiac arrest   - Given LR bolus x1 this AM  - hypokalemia and hypophosphatemia today; replete PRN  - Repleting electrolytes for K>4, Mg>2.       ID: ongoing treatment for OM s/p R heel debridement during prior hospitalization  - Was receiving ertapenem and vancomycin, however as per prior ID notes ertapenem was stopped due to development of rash.    - Wound/tissue cultures from prior admission with MRSA, ESBL E.coli, E faecalis.    - c/w vanc ; most recent vanc level 16.1 ; f/u AM vanc level  - Ale pizarro on arrival to ICU.  Will send UA/ucx.  F/u blood cx. MRSA swab  - ID following      Endocrine: Hx of DM  - on insulin gtt  - Monitor FS q6h  - TSH elevated.  T3/T4 wnl; likely 2/2 recovering from sick euthyroid syndrome    Heme:  - On Eliquis as an outpatient.   - INR elevated 2.33 Continue to holding for now   - SCDs for DVT prophylaxis.        53 year old male with a PMH of AF on Eliquis, indwelling jacques, CAD s/p stents, CVA, DM, HTN, hx PE, recent hospitalization discharged yesterday after an admission for R heel osteomyelitis w/ debridement who presented to the ED from [facility] for difficulty breathing.    Problem list:  Asystolic cardiac arrest  Acute hypoxic respiratory failure  Shock (septic v cardiogenic)   Lactic/respiratory acidosis  OBI  Transaminitis  Hypomagnesemia  Afib  Elevated INR    Neuro: Acute metabolic encephalopathy 2/2 shock  -Transition to ketamine from propofol for sedation for patient comfort.    -Wean as tolerated     CV: Cardiogenic shock 2/2 bradycardia vs distributive shock 2/2 anaphylactic reaction to ertapenem.   -Per medication review w/ [facility] eMAR, patient received total cardizem ER 720mg and Toprol XL 200mg in last 24 hrs  --> ?cardiogenic shock 2/2 bradycardia  -Calcium gluconate to stabilize cardiac membrane and BB toxicity  - Given digoxin 250 mcg  -Also, per chart review patient had rash with blisters after receiving ertapenem during previous hospitalization. Received ertapenem at [facility] s/p discharge. ?distributive shock 2/2 anaphylaxis  - C/w transition levophed to epi gtt for increased chronotropy given bradycardia and asystolic arrests.  Holding home CCB and BB given this.    - EKG on arrival with slow afib but no signs of ischemia.  Initial trop wnl, repeat elevated 114.3 , trend to peak Goal MAP >65.  Echo from March w/ normal LVEF.    - F/u repeat EKG and TTE  - Cardiology following    Pulm: Acute hypoxic respiratory failure /resp acidosis  - C/w w/ full vent support.   - ABGs improving, most recent significant for metabolic acidosis  - c/w bicarb gtt  - SBTs as able.     GI:   - NPO. given multiple dilated bowel loops on CT  - CT AP: < from: CT Abdomen and Pelvis No Cont (06.14.24 @ 00:50) >  Numerous mildly distended gas-filled and fluid-filled small bowel loops   with air-fluid levels and no transition point involving the entire small   bowel is most suggestive of ileus. o ascites or pneumoperitoneum. Emphysema.    - NGT to suction  - previously on opiod medication; placed on bowel regimen  - c/w ppi for GI prophylaxis.   - Transaminitis likely 2/2 shock liver ; f/u CMP    Renal: OBI likely 2/2 ATN from cardiac arrest vs prerenal   - Cr trending up, most recent 3.5 ; on arrival to ED Cr. 1.70  - lactate trending up ; most recent 15.0  - Profound lactic acidosis likely from cardiac arrest   - Given LR bolus x1 this AM  - hypokalemia and hypophosphatemia today; replete PRN  - Repleting electrolytes for K>4, Mg>2.       ID: ongoing treatment for OM s/p R heel debridement during prior hospitalization  - Was receiving ertapenem and vancomycin, however as per prior ID notes ertapenem was stopped due to development of rash.    - Wound/tissue cultures from prior admission with MRSA, ESBL E.coli, E faecalis.    - c/w vanc ; most recent vanc level 16.1 ; f/u AM vanc level  - Jacques exchanged on arrival to ICU.  Will send UA/ucx.  F/u blood cx. MRSA swab  - ID following      Endocrine: Hx of DM  - on insulin gtt  - Monitor FS q6h  - TSH elevated.  T3/T4 wnl; likely 2/2 recovering from sick euthyroid syndrome    Heme:  - On Eliquis as an outpatient.   - INR elevated 2.33 Continue to holding for now   - SCDs for DVT prophylaxis.        53 year old male with a PMH of AF on Eliquis, indwelling jacques, CAD s/p stents, CVA, DM, HTN, hx PE, recent hospitalization discharged yesterday after an admission for R heel osteomyelitis w/ debridement who presented to the ED from [facility] for difficulty breathing.    Problem list:  Asystolic cardiac arrest  Acute hypoxic respiratory failure  Shock (septic v cardiogenic)   Lactic/respiratory acidosis  OBI  Transaminitis  Hypomagnesemia  Afib  Elevated INR    Neuro: Acute metabolic encephalopathy 2/2 shock  -Transition to ketamine from propofol for sedation for patient comfort.    -Wean as tolerated     CV: Cardiogenic shock 2/2 bradycardia vs distributive shock 2/2 anaphylactic reaction to ertapenem.   -Per medication review w/ [facility] eMAR, patient received total cardizem ER 720mg and Toprol XL 200mg in last 24 hrs  --> ?cardiogenic shock 2/2 bradycardia  -Calcium gluconate to stabilize cardiac membrane and BB toxicity  - Given digoxin 250 mcg  -Also, per chart review patient had rash with blisters after receiving ertapenem during previous hospitalization. Received ertapenem at [facility] s/p discharge. ?distributive shock 2/2 anaphylaxis  - C/w transition levophed to epi gtt for increased chronotropy given bradycardia and asystolic arrests.  Holding home CCB and BB given this.    - EKG on arrival with slow afib but no signs of ischemia.  Initial trop wnl, repeat elevated 114.3 , trend to peak Goal MAP >65.  Echo from March w/ normal LVEF.    - F/u repeat EKG and TTE  - Cardiology following    Pulm: Acute hypoxic respiratory failure /resp acidosis  - C/w w/ full vent support.   - ABGs improving, most recent significant for metabolic acidosis  - c/w bicarb gtt  - SBTs as able.     GI:   - NPO. given multiple dilated bowel loops on CT  - CT AP: < from: CT Abdomen and Pelvis No Cont (06.14.24 @ 00:50) >  Numerous mildly distended gas-filled and fluid-filled small bowel loops   with air-fluid levels and no transition point involving the entire small   bowel is most suggestive of ileus. o ascites or pneumoperitoneum. Emphysema.    - NGT to suction  - previously on opiod medication; placed on bowel regimen  - c/w ppi for GI prophylaxis.   - Transaminitis likely 2/2 shock liver ; f/u CMP    Renal: OBI likely 2/2 ATN from cardiac arrest vs prerenal   - Cr trending up, most recent 3.5 ; on arrival to ED Cr. 1.70  - lactate trending up ; most recent 15.0  - Profound lactic acidosis likely from cardiac arrest   - Given LR bolus x1 this AM  - hypokalemia and hypophosphatemia today; replete PRN  - Repleting electrolytes for K>4, Mg>2.       ID: ongoing treatment for OM s/p R heel debridement during prior hospitalization  - Was receiving ertapenem and vancomycin, however as per prior ID notes ertapenem was stopped due to development of rash.    - Wound/tissue cultures from prior admission with MRSA, ESBL E.coli, E faecalis.    - c/w vanc ; most recent vanc level 16.1 ; f/u AM vanc level  - Jacques exchanged on arrival to ICU.  Will send UA/ucx.  F/u blood cx. MRSA swab  - ID following      Endocrine: Hx of DM  - on insulin gtt  - Monitor FS q2h  - TSH elevated.  T3/T4 wnl; likely 2/2 recovering from sick euthyroid syndrome    Heme:  - On Eliquis as an outpatient.   - INR elevated 2.33 Continue to holding for now   - SCDs for DVT prophylaxis.        53 year old male with a PMH of AF on Eliquis, indwelling jacques, CAD s/p stents, CVA, DM, HTN, hx PE, recent hospitalization discharged yesterday after an admission for R heel osteomyelitis w/ debridement who presented to the ED from [facility] for difficulty breathing.    Problem list:  Asystolic cardiac arrest  Acute hypoxic respiratory failure  Shock (septic v cardiogenic)   Lactic/respiratory acidosis  OBI  Transaminitis  Hypomagnesemia  Afib  Elevated INR    Neuro: Acute metabolic encephalopathy 2/2 shock  -Transition to ketamine from propofol for sedation for patient comfort.    -Wean as tolerated     CV: Cardiogenic shock 2/2 bradycardia vs distributive shock 2/2 anaphylactic reaction to ertapenem.   -Per medication review w/ [facility] eMAR, patient received total cardizem ER 720mg and Toprol XL 200mg in last 24 hrs  --> ?cardiogenic shock 2/2 bradycardia  -Calcium gluconate to stabilize cardiac membrane and BB toxicity  - Given digoxin 250 mcg  -Also, per chart review patient had rash with blisters after receiving ertapenem during previous hospitalization. Received ertapenem at [facility] s/p discharge. ?distributive shock 2/2 anaphylaxis  - C/w transition levophed to epi gtt for increased chronotropy given bradycardia and asystolic arrests.  Holding home CCB and BB given this.    - EKG on arrival with slow afib but no signs of ischemia.  Initial trop wnl, repeat elevated 114.3 , trend to peak Goal MAP >65.  Echo from March w/ normal LVEF.    - F/u repeat EKG and TTE  - Cardiology following    Pulm: Acute hypoxic respiratory failure /resp acidosis  - C/w w/ full vent support.   - ABGs improving, most recent significant for metabolic acidosis  - c/w bicarb gtt  - SBTs as able.     GI:   - NPO. given multiple dilated bowel loops on CT  - CT AP: < from: CT Abdomen and Pelvis No Cont (06.14.24 @ 00:50) >  Numerous mildly distended gas-filled and fluid-filled small bowel loops   with air-fluid levels and no transition point involving the entire small   bowel is most suggestive of ileus. o ascites or pneumoperitoneum. Emphysema.    - NGT to suction  - previously on opiod medication; placed on bowel regimen  - c/w ppi for GI prophylaxis.   - Transaminitis likely 2/2 shock liver ; f/u CMP    Renal: OBI likely 2/2 ATN from cardiac arrest vs prerenal   - Cr trending up, most recent 3.5 ; on arrival to ED Cr. 1.70  - lactate trending up ; most recent 15.0  - Profound lactic acidosis likely from cardiac arrest   - Given LR bolus x1 this AM  - hypokalemia and hypophosphatemia today; replete PRN  - Repleting electrolytes for K>4, Mg>2.       ID: ongoing treatment for OM s/p R heel debridement during prior hospitalization  - Was receiving ertapenem and vancomycin, however as per prior ID notes ertapenem was stopped due to development of rash.    - Wound/tissue cultures from prior admission with MRSA, ESBL E.coli, E faecalis.    - c/w vanc ; most recent vanc level 16.1 ; f/u AM vanc level  - Ale exchanged on arrival to ICU.  Will send UA/ucx.  F/u blood cx. MRSA swab  - ID following      Endocrine: Hx of DM  - on insulin gtt  - Monitor FS q2h  - TSH elevated.  T3/T4 wnl; likely 2/2 recovering from sick euthyroid syndrome    Heme:  - On Eliquis as an outpatient.   - INR elevated 2.33 Continue to holding for now   - SCDs for DVT prophylaxis.     Sister Yecenia updated via phone.

## 2024-06-14 NOTE — DIETITIAN INITIAL EVALUATION ADULT - NSFNSPHYEXAMSKINFT_GEN_A_CORE
Pressure Injury 1: Left:, buttocks, Stage II  Pressure Injury 2: sacrum, coccyx, Stage I, Stage II  Pressure Injury 3: coccyx, Stage I  Pressure Injury 4: none, none  Pressure Injury 5: none, none  Pressure Injury 6: none, none  Pressure Injury 7: none, none  Pressure Injury 8: none, none  Pressure Injury 9: none, none  Pressure Injury 10: none, none  Pressure Injury 11: none, none, Pressure Injury 1: Left:, buttocks, Stage I, Stage II, [id number] [id number] in center  Pressure Injury 2: sacrum, Stage II  Pressure Injury 3: coccyx, Stage I  Pressure Injury 4: none, none  Pressure Injury 5: none, none  Pressure Injury 6: none, none  Pressure Injury 7: none, none  Pressure Injury 8: none, none  Pressure Injury 9: none, none  Pressure Injury 10: none, none  Pressure Injury 11: none, none Pressure Injury 1: Left:, buttocks, Stage II  Pressure Injury 2: sacrum, coccyx, Stage I, Stage II  Pressure Injury 3: coccyx, Stage I

## 2024-06-14 NOTE — CONSULT NOTE ADULT - ASSESSMENT
agree with above unless contradicts below  likely ileus  cont ngt as needed  signing off, please call if needed

## 2024-06-14 NOTE — CONSULT NOTE ADULT - ASSESSMENT
OBI: ATN  Hypokalemia  Metabolic acidosis: OBI, Lactic acidosis  Diabetes  s/p Cardiac arrest OBI: ATN  Hypokalemia  Metabolic acidosis: OBI, Lactic acidosis  Diabetes  s/p Cardiac arrest    Monitor renal indices on current treatment. Started on bicarb drip. Potassium supplementation. Check cultures, IV abx.   Avoid nephrotoxic meds as possible. Avoid ACEI, ARB, NSAIDs and IV contrast. Will follow electrolytes and renal function trend.   Monitor blood sugar levels. Insulin coverage as needed. D/w ICU team.     Further recommendations pending clinical course. Thank you for the courtesy of this referral.

## 2024-06-14 NOTE — CARE COORDINATION ASSESSMENT. - NSCAREPROVIDERS_GEN_ALL_CORE_FT
CARE PROVIDERS:  Accepting Physician: Hill Brizuela  Access Services: Carmelina Bryant  Access Services: Jayshree Hameed  Access Services: Blanche Sage  Administration: Guilherme Engel  Administration: Venu Estrada  Administration: Gabbie Scherer  Admitting: Hill Brizuela  Attending: Hill Brizuela  Cardiology Technician: Mounika Ho  Consultant: Anca Greer  Consultant: Diamante Griffith  Consultant: Violeta Littlejohn  Consultant: Dilcia Sin  Consultant: Juventino Soto  Consultant: Randy Mcgee  Consultant: Rahel Boss  Consultant: Juventino Whitten  Consultant: James Cohen  Consultant: Jimbo Cullen  Consultant: Pascual Salazar  Consultant: Shaquille Summers  Consultant: Alban Wilson  Consultant: Mounika Bay  Covering Team: Alexandru Hardin  Covering Team: Tere Farrar  ED Attending: Vini Kapoor  ED Nurse: Danika Andrew  Nurse: Elma Harris  Nurse: Ebonie Lopez  Nurse: Fariha Aguilar  Nurse: Carole Blank  Nurse: Isidro Bob  Nurse: Nancy Ross  Nurse: Alirio Craven  Nurse: Melva Landrum  Nurse: Gloria Junior  Nurse: Diamante Mendez  Ordered: ADM, User  Ordered: ServiceAccount, SCMMLM  Outpatient Provider: Grisel Murray  Override: Fariha Aguilar  PCA/Nursing Assistant: Leslie Villanueva  PCA/Nursing Assistant: Guadalupe Nguyen  Primary Team: Hill Brizuela  Primary Team: Celso Ludwig  Primary Team: Vickie Muniz  Primary Team: Roger Yeager  Primary Team: Alisa Champion  Primary Team: Courtney Walls  Registered Dietitian: Bebe Anna  Registered Dietitian: Stephie Conteh  Respiratory Therapy: Dilcia Zuleta  : Gaye Lui// Supp. Assoc.: Dk Lim

## 2024-06-15 LAB
ALBUMIN SERPL ELPH-MCNC: 2.1 G/DL — LOW (ref 3.3–5)
ALP SERPL-CCNC: 107 U/L — SIGNIFICANT CHANGE UP (ref 40–120)
ALT FLD-CCNC: 380 U/L — HIGH (ref 12–78)
ANION GAP SERPL CALC-SCNC: 10 MMOL/L — SIGNIFICANT CHANGE UP (ref 5–17)
AST SERPL-CCNC: 219 U/L — HIGH (ref 15–37)
BASE EXCESS BLDA CALC-SCNC: 2.3 MMOL/L — SIGNIFICANT CHANGE UP (ref -2–3)
BASOPHILS # BLD AUTO: 0.03 K/UL — SIGNIFICANT CHANGE UP (ref 0–0.2)
BASOPHILS NFR BLD AUTO: 0.2 % — SIGNIFICANT CHANGE UP (ref 0–2)
BILIRUB SERPL-MCNC: 0.5 MG/DL — SIGNIFICANT CHANGE UP (ref 0.2–1.2)
BLOOD GAS COMMENTS ARTERIAL: SIGNIFICANT CHANGE UP
BUN SERPL-MCNC: 52 MG/DL — HIGH (ref 7–23)
CALCIUM SERPL-MCNC: 8.2 MG/DL — LOW (ref 8.5–10.1)
CHLORIDE SERPL-SCNC: 108 MMOL/L — SIGNIFICANT CHANGE UP (ref 96–108)
CO2 SERPL-SCNC: 25 MMOL/L — SIGNIFICANT CHANGE UP (ref 22–31)
CREAT ?TM UR-MCNC: 43 MG/DL — SIGNIFICANT CHANGE UP
CREAT SERPL-MCNC: 2.6 MG/DL — HIGH (ref 0.5–1.3)
EGFR: 28 ML/MIN/1.73M2 — LOW
EOSINOPHIL # BLD AUTO: 0.28 K/UL — SIGNIFICANT CHANGE UP (ref 0–0.5)
EOSINOPHIL NFR BLD AUTO: 1.8 % — SIGNIFICANT CHANGE UP (ref 0–6)
GAS PNL BLDA: SIGNIFICANT CHANGE UP
GLUCOSE BLDC GLUCOMTR-MCNC: 187 MG/DL — HIGH (ref 70–99)
GLUCOSE BLDC GLUCOMTR-MCNC: 207 MG/DL — HIGH (ref 70–99)
GLUCOSE BLDC GLUCOMTR-MCNC: 211 MG/DL — HIGH (ref 70–99)
GLUCOSE BLDC GLUCOMTR-MCNC: 212 MG/DL — HIGH (ref 70–99)
GLUCOSE BLDC GLUCOMTR-MCNC: 217 MG/DL — HIGH (ref 70–99)
GLUCOSE BLDC GLUCOMTR-MCNC: 232 MG/DL — HIGH (ref 70–99)
GLUCOSE BLDC GLUCOMTR-MCNC: 249 MG/DL — HIGH (ref 70–99)
GLUCOSE BLDC GLUCOMTR-MCNC: 297 MG/DL — HIGH (ref 70–99)
GLUCOSE SERPL-MCNC: 232 MG/DL — HIGH (ref 70–99)
HCO3 BLDA-SCNC: 26 MMOL/L — SIGNIFICANT CHANGE UP (ref 21–28)
HCT VFR BLD CALC: 35.6 % — LOW (ref 39–50)
HGB BLD-MCNC: 11.6 G/DL — LOW (ref 13–17)
HOROWITZ INDEX BLDA+IHG-RTO: 30 — SIGNIFICANT CHANGE UP
IMM GRANULOCYTES NFR BLD AUTO: 0.6 % — SIGNIFICANT CHANGE UP (ref 0–0.9)
INR BLD: 1.97 RATIO — HIGH (ref 0.85–1.18)
LACTATE SERPL-SCNC: 1.9 MMOL/L — SIGNIFICANT CHANGE UP (ref 0.7–2)
LYMPHOCYTES # BLD AUTO: 1 K/UL — SIGNIFICANT CHANGE UP (ref 1–3.3)
LYMPHOCYTES # BLD AUTO: 6.3 % — LOW (ref 13–44)
MAGNESIUM SERPL-MCNC: 1.5 MG/DL — LOW (ref 1.6–2.6)
MCHC RBC-ENTMCNC: 30.5 PG — SIGNIFICANT CHANGE UP (ref 27–34)
MCHC RBC-ENTMCNC: 32.6 GM/DL — SIGNIFICANT CHANGE UP (ref 32–36)
MCV RBC AUTO: 93.7 FL — SIGNIFICANT CHANGE UP (ref 80–100)
MONOCYTES # BLD AUTO: 1.39 K/UL — HIGH (ref 0–0.9)
MONOCYTES NFR BLD AUTO: 8.8 % — SIGNIFICANT CHANGE UP (ref 2–14)
NEUTROPHILS # BLD AUTO: 12.98 K/UL — HIGH (ref 1.8–7.4)
NEUTROPHILS NFR BLD AUTO: 82.3 % — HIGH (ref 43–77)
NRBC # BLD: 0 /100 WBCS — SIGNIFICANT CHANGE UP (ref 0–0)
OB PNL STL: POSITIVE
OSMOLALITY UR: 354 MOSM/KG — SIGNIFICANT CHANGE UP (ref 50–1200)
PCO2 BLDA: 39 MMHG — SIGNIFICANT CHANGE UP (ref 35–48)
PH BLDA: 7.44 — SIGNIFICANT CHANGE UP (ref 7.35–7.45)
PHOSPHATE SERPL-MCNC: 2.7 MG/DL — SIGNIFICANT CHANGE UP (ref 2.5–4.5)
PLATELET # BLD AUTO: 238 K/UL — SIGNIFICANT CHANGE UP (ref 150–400)
PO2 BLDA: 118 MMHG — HIGH (ref 83–108)
POTASSIUM SERPL-MCNC: 3.6 MMOL/L — SIGNIFICANT CHANGE UP (ref 3.5–5.3)
POTASSIUM SERPL-SCNC: 3.6 MMOL/L — SIGNIFICANT CHANGE UP (ref 3.5–5.3)
PROT SERPL-MCNC: 5.4 G/DL — LOW (ref 6–8.3)
PROTHROM AB SERPL-ACNC: 22.6 SEC — HIGH (ref 9.5–13)
RBC # BLD: 3.8 M/UL — LOW (ref 4.2–5.8)
RBC # FLD: 14.6 % — HIGH (ref 10.3–14.5)
SAO2 % BLDA: 100 % — HIGH (ref 94–98)
SODIUM SERPL-SCNC: 143 MMOL/L — SIGNIFICANT CHANGE UP (ref 135–145)
SODIUM UR-SCNC: 61 MMOL/L — SIGNIFICANT CHANGE UP
VANCOMYCIN FLD-MCNC: 17.5 UG/ML — SIGNIFICANT CHANGE UP (ref 10–25)
WBC # BLD: 15.77 K/UL — HIGH (ref 3.8–10.5)
WBC # FLD AUTO: 15.77 K/UL — HIGH (ref 3.8–10.5)

## 2024-06-15 PROCEDURE — 99291 CRITICAL CARE FIRST HOUR: CPT | Mod: GC

## 2024-06-15 PROCEDURE — 99292 CRITICAL CARE ADDL 30 MIN: CPT

## 2024-06-15 PROCEDURE — 99291 CRITICAL CARE FIRST HOUR: CPT

## 2024-06-15 PROCEDURE — 93308 TTE F-UP OR LMTD: CPT | Mod: 26,GC

## 2024-06-15 RX ORDER — MUPIROCIN 20 MG/G
1 OINTMENT TOPICAL
Refills: 0 | Status: COMPLETED | OUTPATIENT
Start: 2024-06-15 | End: 2024-06-20

## 2024-06-15 RX ORDER — VANCOMYCIN HYDROCHLORIDE 50 MG/ML
750 KIT ORAL ONCE
Refills: 0 | Status: COMPLETED | OUTPATIENT
Start: 2024-06-15 | End: 2024-06-15

## 2024-06-15 RX ORDER — ACETAMINOPHEN 325 MG
650 TABLET ORAL EVERY 6 HOURS
Refills: 0 | Status: DISCONTINUED | OUTPATIENT
Start: 2024-06-15 | End: 2024-06-18

## 2024-06-15 RX ORDER — METOPROLOL TARTRATE 50 MG
5 TABLET ORAL ONCE
Refills: 0 | Status: COMPLETED | OUTPATIENT
Start: 2024-06-15 | End: 2024-06-15

## 2024-06-15 RX ORDER — METOPROLOL TARTRATE 50 MG
12.5 TABLET ORAL EVERY 12 HOURS
Refills: 0 | Status: DISCONTINUED | OUTPATIENT
Start: 2024-06-15 | End: 2024-06-16

## 2024-06-15 RX ORDER — MAGNESIUM SULFATE 100 %
2 POWDER (GRAM) MISCELLANEOUS EVERY 4 HOURS
Refills: 0 | Status: COMPLETED | OUTPATIENT
Start: 2024-06-15 | End: 2024-06-15

## 2024-06-15 RX ORDER — DEXMEDETOMIDINE HYDROCHLORIDE 4 UG/ML
0.6 INJECTION, SOLUTION INTRAVENOUS
Qty: 200 | Refills: 0 | Status: DISCONTINUED | OUTPATIENT
Start: 2024-06-15 | End: 2024-06-16

## 2024-06-15 RX ORDER — DEXTROSE MONOHYDRATE AND SODIUM CHLORIDE 5; .3 G/100ML; G/100ML
1000 INJECTION, SOLUTION INTRAVENOUS
Refills: 0 | Status: DISCONTINUED | OUTPATIENT
Start: 2024-06-15 | End: 2024-06-17

## 2024-06-15 RX ORDER — HEPARIN SODIUM 50 [USP'U]/ML
5000 INJECTION, SOLUTION INTRAVENOUS EVERY 8 HOURS
Refills: 0 | Status: DISCONTINUED | OUTPATIENT
Start: 2024-06-15 | End: 2024-06-16

## 2024-06-15 RX ORDER — DEXTROSE MONOHYDRATE AND SODIUM CHLORIDE 5; .3 G/100ML; G/100ML
500 INJECTION, SOLUTION INTRAVENOUS ONCE
Refills: 0 | Status: COMPLETED | OUTPATIENT
Start: 2024-06-15 | End: 2024-06-15

## 2024-06-15 RX ORDER — PROPOFOL 10 MG/ML
30 INJECTION, EMULSION INTRAVENOUS
Qty: 1000 | Refills: 0 | Status: DISCONTINUED | OUTPATIENT
Start: 2024-06-15 | End: 2024-06-16

## 2024-06-15 RX ORDER — MAGNESIUM SULFATE 100 %
1 POWDER (GRAM) MISCELLANEOUS ONCE
Refills: 0 | Status: COMPLETED | OUTPATIENT
Start: 2024-06-15 | End: 2024-06-15

## 2024-06-15 RX ORDER — METOPROLOL TARTRATE 50 MG
5 TABLET ORAL EVERY 4 HOURS
Refills: 0 | Status: DISCONTINUED | OUTPATIENT
Start: 2024-06-15 | End: 2024-06-15

## 2024-06-15 RX ADMIN — Medication 5 MILLIGRAM(S): at 07:49

## 2024-06-15 RX ADMIN — Medication 100 GRAM(S): at 08:54

## 2024-06-15 RX ADMIN — DEXTROSE MONOHYDRATE AND SODIUM CHLORIDE 1000 MILLILITER(S): 5; .3 INJECTION, SOLUTION INTRAVENOUS at 11:13

## 2024-06-15 RX ADMIN — DEXMEDETOMIDINE HYDROCHLORIDE 15.2 MICROGRAM(S)/KG/HR: 4 INJECTION, SOLUTION INTRAVENOUS at 15:46

## 2024-06-15 RX ADMIN — Medication 5 MILLIGRAM(S): at 14:42

## 2024-06-15 RX ADMIN — DEXMEDETOMIDINE HYDROCHLORIDE 15.2 MICROGRAM(S)/KG/HR: 4 INJECTION, SOLUTION INTRAVENOUS at 12:40

## 2024-06-15 RX ADMIN — PROPOFOL 2.45 MICROGRAM(S)/KG/MIN: 10 INJECTION, EMULSION INTRAVENOUS at 12:50

## 2024-06-15 RX ADMIN — DEXMEDETOMIDINE HYDROCHLORIDE 15.2 MICROGRAM(S)/KG/HR: 4 INJECTION, SOLUTION INTRAVENOUS at 10:35

## 2024-06-15 RX ADMIN — Medication 25 GRAM(S): at 14:38

## 2024-06-15 RX ADMIN — DEXMEDETOMIDINE HYDROCHLORIDE 15.2 MICROGRAM(S)/KG/HR: 4 INJECTION, SOLUTION INTRAVENOUS at 18:07

## 2024-06-15 RX ADMIN — Medication 2.53 MG/KG/HR: at 02:51

## 2024-06-15 RX ADMIN — PROPOFOL 2.45 MICROGRAM(S)/KG/MIN: 10 INJECTION, EMULSION INTRAVENOUS at 02:51

## 2024-06-15 RX ADMIN — AMIODARONE HYDROCHLORIDE 16.7 MG/MIN: 50 INJECTION, SOLUTION INTRAVENOUS at 02:51

## 2024-06-15 RX ADMIN — Medication 25 GRAM(S): at 10:35

## 2024-06-15 RX ADMIN — DEXMEDETOMIDINE HYDROCHLORIDE 15.2 MICROGRAM(S)/KG/HR: 4 INJECTION, SOLUTION INTRAVENOUS at 20:53

## 2024-06-15 RX ADMIN — SODIUM BICARBONATE 50 MEQ/KG/HR: 650 TABLET ORAL at 05:00

## 2024-06-15 RX ADMIN — DEXTROSE MONOHYDRATE AND SODIUM CHLORIDE 60 MILLILITER(S): 5; .3 INJECTION, SOLUTION INTRAVENOUS at 23:11

## 2024-06-15 RX ADMIN — Medication 15 MILLILITER(S): at 17:54

## 2024-06-15 RX ADMIN — PANTOPRAZOLE SODIUM 40 MILLIGRAM(S): 40 INJECTION, POWDER, FOR SOLUTION INTRAVENOUS at 11:11

## 2024-06-15 RX ADMIN — Medication 15 MILLILITER(S): at 06:59

## 2024-06-15 RX ADMIN — Medication 5 MILLIGRAM(S): at 10:35

## 2024-06-15 RX ADMIN — PROPOFOL 18.2 MICROGRAM(S)/KG/MIN: 10 INJECTION, EMULSION INTRAVENOUS at 18:07

## 2024-06-15 RX ADMIN — DEXMEDETOMIDINE HYDROCHLORIDE 15.2 MICROGRAM(S)/KG/HR: 4 INJECTION, SOLUTION INTRAVENOUS at 23:31

## 2024-06-15 RX ADMIN — MUPIROCIN 1 APPLICATION(S): 20 OINTMENT TOPICAL at 17:54

## 2024-06-15 RX ADMIN — Medication 1 APPLICATION(S): at 11:12

## 2024-06-15 RX ADMIN — HEPARIN SODIUM 5000 UNIT(S): 50 INJECTION, SOLUTION INTRAVENOUS at 14:42

## 2024-06-15 RX ADMIN — VANCOMYCIN HYDROCHLORIDE 250 MILLIGRAM(S): KIT at 14:37

## 2024-06-15 RX ADMIN — Medication 250 MICROGRAM(S): at 00:00

## 2024-06-15 RX ADMIN — PROPOFOL 2.45 MICROGRAM(S)/KG/MIN: 10 INJECTION, EMULSION INTRAVENOUS at 07:50

## 2024-06-15 RX ADMIN — HEPARIN SODIUM 5000 UNIT(S): 50 INJECTION, SOLUTION INTRAVENOUS at 21:12

## 2024-06-15 NOTE — PROGRESS NOTE ADULT - ATTENDING COMMENTS
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    --propofol and precedex for sedation in hopes that this may help with uncontrolled afib  --shock state improved, on low dose phenylephrine  may be volume response, fluid challenge  --uncontrolled afib, s/p dig load, and no improvement with amio gtt  start low dose lopessor IV   likely still with eliquis on board given OBI and elevated INR  --acute hypoxemia respiratory failure  continue full support given hemodynamic instability  --OBI likely ATN, metabolic acidosis improving  d/c bicarb gtt  severe lactic acidosis resolved  --ileus seems improved, start trickle TF  shock liver improving, continue supportive care  --MRSA osteomyelitis, continue vanc, f/u Cx data  --uncontrolled DM, will likely improve now off bicarb gtt  continue ISS, may require lantus tonight  --sister updated by Dr. Walls

## 2024-06-15 NOTE — CHART NOTE - NSCHARTNOTEFT_GEN_A_CORE
Time of evaluation: 00:36    Called to evaluate patient for restraints    Other interventions attempted: de-escalation, orientation check, environment modification, medication assessment    REVIEW OF SYSTEMS:  CONSTITUTIONAL: [  ] fever   [  ] fatigue  EYES: [  ] visual disturbances  ENMT:  [  ]difficulty hearing  [  ] throat pain  RESPIRATORY:  [  ]cough  [   ] wheezing  [   ] hemoptysis [  ] shortness of breath  CARDIOVASCULAR: [  ]chest pain  [  ] palpitations   GASTROINTESTINAL: [  ]  abdominal pain [  ] nausea [  ] vomiting  [  ] diarrhea  [  ] constipation  GENITOURINARY: [  ] dysuria [  ] frequency  [  ] hematuria [  ] incontinence  NEUROLOGICAL: [  ] headaches [  ] new numbness  SKIN: [  ]itching [  ] new rash   MUSCULOSKELETAL: [  ] back pain  [  ] extremity pain  PSYCHIATRIC: [  ] depression  [  ] anxiety  [  ] mood swings [  ] difficulty sleeping  ALLERGY AND IMMUNOLOGIC: [  ] hives    [ X ]Unable to perform ROS due to: intubated/sedated  [  ] ROS reviewed and all negative    PAST MEDICAL & SURGICAL HISTORY:  Diabetes  Diabetes mellitus with no complication  Afib  Hypertension  BPH (benign prostatic hyperplasia)  Perforated gastric ulcer  s/p emergent ex-lap omentopexy and plication 6/2019  Pulmonary embolism  History of non-ST elevation myocardial infarction (NSTEMI)  Osteomyelitis  s/p debridement  CAD S/P percutaneous coronary angioplasty  Cerebrovascular accident  H/O abdominal surgery  Perforated gastric ulcer  Traumatic amputation of left foot, initial encounter    MEDICATIONS  (STANDING):  aMIOdarone Infusion 1 mG/Min (33.3 mL/Hr) IV Continuous <Continuous>  aMIOdarone Infusion 0.5 mG/Min (16.7 mL/Hr) IV Continuous <Continuous>  chlorhexidine 0.12% Liquid 15 milliLiter(s) Oral Mucosa every 12 hours  chlorhexidine 2% Cloths 1 Application(s) Topical daily  dextrose 10% Bolus 125 milliLiter(s) IV Bolus once  dextrose 5%. 1000 milliLiter(s) (100 mL/Hr) IV Continuous <Continuous>  dextrose 5%. 1000 milliLiter(s) (50 mL/Hr) IV Continuous <Continuous>  dextrose 50% Injectable 25 Gram(s) IV Push once  dextrose 50% Injectable 12.5 Gram(s) IV Push once  glucagon  Injectable 1 milliGRAM(s) IntraMuscular once  insulin regular Infusion 10 Unit(s)/Hr (10 mL/Hr) IV Continuous <Continuous>  ketamine Infusion. 0.25 mG/kG/Hr (2.53 mL/Hr) IV Continuous <Continuous>  naloxegol 25 milliGRAM(s) Oral daily  norepinephrine Infusion 0.35 MICROgram(s)/kG/Min (33.2 mL/Hr) IV Continuous <Continuous>  pantoprazole  Injectable 40 milliGRAM(s) IV Push daily  phenylephrine    Infusion 0.1 MICROgram(s)/kG/Min (3.8 mL/Hr) IV Continuous <Continuous>  polyethylene glycol 3350 17 Gram(s) Oral daily  propofol Infusion 5 MICROgram(s)/kG/Min (2.45 mL/Hr) IV Continuous <Continuous>  senna 2 Tablet(s) Oral at bedtime  sodium bicarbonate  Infusion 0.074 mEq/kG/Hr (50 mL/Hr) IV Continuous <Continuous>  vasopressin Infusion 0.04 Unit(s)/Min (6 mL/Hr) IV Continuous <Continuous>  MEDICATIONS  (PRN):  dextrose Oral Gel 15 Gram(s) Oral once PRN Blood Glucose LESS THAN 70 milliGRAM(s)/deciliter  sodium chloride 0.9% lock flush 10 milliLiter(s) IV Push every 1 hour PRN Pre/post blood products, medications, blood draw, and to maintain line patency                        13.7   34.23 )-----------( 446      ( 14 Jun 2024 04:30 )             45.0     06-14  139  |  106  |  50<H>  ----------------------------<  371<H>  3.8   |  19<L>  |  3.00<H>  Ca    8.0<L>      14 Jun 2024 13:37  Phos  0.8     06-14  Mg     1.9     06-14  TPro  6.8  /  Alb  2.7<L>  /  TBili  0.7  /  DBili  x   /  AST  385<H>  /  ALT  435<H>  /  AlkPhos  152<H>  06-14    Vital Signs Last 24 Hrs  T(C): 38.4 (14 Jun 2024 23:30), Max: 38.6 (14 Jun 2024 18:30)  T(F): 101.1 (14 Jun 2024 23:30), Max: 101.5 (14 Jun 2024 18:30)  HR: 153 (14 Jun 2024 22:30) (51 - 171)  BP: 133/67 (14 Jun 2024 22:30) (79/52 - 139/64)  BP(mean): 91 (14 Jun 2024 22:30) (61 - 97)  RR: 21 (14 Jun 2024 22:30) (14 - 28)  SpO2: 100% (14 Jun 2024 22:30) (96% - 100%)  Parameters below as of 14 Jun 2024 06:00  Patient On (Oxygen Delivery Method): ventilator  O2 Concentration (%): 40    Physical Examination:  General: +Intubated.   HEENT: PERRL.   PULM: CTA B/L.   CVS: s1/s2.   ABD: Soft, nondistended, nontender, normoactive bowel sounds.  EXT: No edema, nontender  SKIN: Warm.    [  ] Unable to perform physical exam due to    [ X ] I have evaluated this patient and have determined that restraints are warranted to optimize medical care. Patient was assessed for current physical and psychological risk factors as well as special needs. There are no medical conditions or limitations that would place this patient at risk while in restraints.    Type of restraint: Bilateral soft wrist restraints    Behavioral criteria for discontinuation of restraint: [ X ] See order    Attending MD Aware    Time spent on this patient encounter, which includes documenting this note in the electronic medical record, was +35 minutes including assessing the presenting problems with associated risks, reviewing the medical record to prepare for the encounter, and meeting face to face with the patient to obtain additional history. I have also performed an appropriate physical exam, made interventions listed and ordered and interpreted appropriate diagnostic studies as documented. To improve communication and patient safety, I have coordinated care with the multidisciplinary team including the bedside nurse, appropriate attending of record and consultants as needed. This time is independent of any procedures performed.    Date of entry of this note is equal to the date of services rendered.

## 2024-06-15 NOTE — PROGRESS NOTE ADULT - SUBJECTIVE AND OBJECTIVE BOX
Patient is a 53y old  Male who presents with a chief complaint of Acute respiratory failure with hypoxia (15 Paul 2024 13:30)    24 hour events: Patient had Tmasx 101.5 last night. Temperatures remained elevated but afebrile. HR has been maintaining ~150s, in afib. S/P additional dig 250 x 1, lopressor 5 x 1, continues amio gtt. Remains intubated, sedated; now off insulin gtt. Seen having spontaneous movements.     REVIEW OF SYSTEMS: unable to obtain 2/2 intubated/ sedated    T(F): 98.4 (06-15-24 @ 12:03), Max: 101.5 (06-14-24 @ 18:30)  HR: 120 (06-15-24 @ 14:00) (111 - 161)  BP: 102/69 (06-15-24 @ 14:00) (81/52 - 139/64)  RR: 20 (06-15-24 @ 14:00) (13 - 28)  SpO2: 100% (06-15-24 @ 14:00) (96% - 100%)  Wt(kg): --    Mode: AC/ CMV (Assist Control/ Continuous Mandatory Ventilation), RR (machine): 18, TV (machine): 500, FiO2: 30, PEEP: 5      I&O's Detail    14 Jun 2024 07:01  -  15 Paul 2024 07:00  --------------------------------------------------------  IN:    Amiodarone: 350.3 mL    EPINEPHrine: 340.8 mL    Insulin: 100 mL    IV PiggyBack: 300 mL    IV PiggyBack: 500 mL    IV PiggyBack: 100 mL    Ketamine: 238.2 mL    Lactated Ringers: 300 mL    Norepinephrine: 331.1 mL    Norepinephrine: 174.3 mL    Phenylephrine: 106.4 mL    Propofol: 130.7 mL    Sodium Bicarbonate: 1200 mL  Total IN: 4171.8 mL    OUT:    Indwelling Catheter - Urethral (mL): 2295 mL    Nasogastric/Oral tube (mL): 400 mL  Total OUT: 2695 mL    Total NET: 1476.8 mL      15 Paul 2024 07:01  -  15 Paul 2024 14:38  --------------------------------------------------------  IN:    Amiodarone: 66.8 mL    Dexmedetomidine: 15.2 mL    IV PiggyBack: 150 mL    Lactated Ringers Bolus: 500 mL    Phenylephrine: 34.2 mL    Propofol: 72.8 mL    Sodium Bicarbonate: 200 mL  Total IN: 1039 mL    OUT:    Indwelling Catheter - Urethral (mL): 425 mL    Ketamine: 0 mL  Total OUT: 425 mL    Total NET: 614 mL        PHYSICAL EXAM:  General: Ill appearing male +intubated/sedated  HEENT: Pupils equal, reactive to light.  Symmetric.  PULM: Clear to auscultation bilaterally  CVS: +tachycardia, irregularly irregular   ABD: mildly Distended abdomen   EXT: Wounds to the b/l LE (LLE transmetatarsal amputation)  SKIN: +blistering b/l RUE Erythematous rash appreciated to the trunk and b/l UE extremities   NEURO: Nonpurposeful movements     MEDICATIONS  vancomycin  IVPB IV Intermittent    aMIOdarone Infusion IV Continuous  aMIOdarone Infusion IV Continuous  metoprolol tartrate Injectable IV Push  norepinephrine Infusion IV Continuous  phenylephrine    Infusion IV Continuous    dextrose 50% Injectable IV Push  dextrose 50% Injectable IV Push  dextrose Oral Gel Oral PRN  glucagon  Injectable IntraMuscular      acetaminophen   Oral Liquid .. Oral PRN  dexMEDEtomidine Infusion IV Continuous  propofol Infusion IV Continuous      heparin   Injectable SubCutaneous    pantoprazole  Injectable IV Push      dextrose 10% Bolus IV Bolus  dextrose 5%. IV Continuous  dextrose 5%. IV Continuous  magnesium sulfate  IVPB IV Intermittent  sodium bicarbonate  Infusion IV Continuous  sodium chloride 0.9% lock flush IV Push PRN      chlorhexidine 0.12% Liquid Oral Mucosa  chlorhexidine 2% Cloths Topical  mupirocin 2% Nasal Both Nostrils                            11.6   15.77 )-----------( 238      ( 15 Paul 2024 05:40 )             35.6       06-15    143  |  108  |  52<H>  ----------------------------<  232<H>  3.6   |  25  |  2.60<H>    Ca    8.2<L>      15 Paul 2024 05:40  Phos  2.7     06-15  Mg     1.5     06-15    TPro  5.4<L>  /  Alb  2.1<L>  /  TBili  0.5  /  DBili  x   /  AST  219<H>  /  ALT  380<H>  /  AlkPhos  107  06-15    Lactate 1.9           06-15 @ 05:40      CARDIAC MARKERS ( 14 Jun 2024 10:05 )  x     / x     / 35 U/L / x     / x      CARDIAC MARKERS ( 13 Jun 2024 23:55 )  x     / x     / x     / x     / <1.0 ng/mL      PT/INR - ( 15 Paul 2024 05:40 )   PT: 22.6 sec;   INR: 1.97 ratio         PTT - ( 14 Jun 2024 04:30 )  PTT:42.6 sec  Urinalysis Basic - ( 15 Paul 2024 05:40 )    Color: x / Appearance: x / SG: x / pH: x  Gluc: 232 mg/dL / Ketone: x  / Bili: x / Urobili: x   Blood: x / Protein: x / Nitrite: x   Leuk Esterase: x / RBC: x / WBC x   Sq Epi: x / Non Sq Epi: x / Bacteria: x      .Surgical Swab RIGHT HEEL WOUND   Numerous Citrobacter freundii complex  Moderate Staphylococcus aureus -- 06-14 @ 02:47  .Blood Blood-Peripheral   No growth at 24 hours -- 06-13 @ 22:55  .Blood Blood-Peripheral   No growth at 24 hours -- 06-13 @ 22:50      Rapid RVP Result: NotDetec (06-14 @ 17:00)    Radiology: < from: Xray Chest 1 View- PORTABLE-Urgent (Xray Chest 1 View- PORTABLE-Urgent .) (06.14.24 @ 11:42) >  ACC: [id number] EXAM:  XR CHEST PORTABLE URGENT 1V   ORDERED BY:    TEVIN BAILEY     ACC: [id number] EXAM:  XR CHEST PORTABLE IMMED 1V   ORDERED BY: ISABEL MCKAY     PROCEDURE DATE:  06/14/2024          INTERPRETATION:  AP chest on June 14, 2024 at 2:20 AM. Patient requires   intubation.    Heart magnified by technique.    Endotracheal tube and nasogastric tube are again noted. Right PICC line   remains.    There is a persistent mild left base infiltrate with slight elevation of   the left hemidiaphragm.    Left jugular line is been inserted new since June 13. Otherwise no change.    Follow-up AP chest on June 14, 2024 at 11:24 AM. No change.    IMPRESSION: Left jugular line inserted. Persistent mild left base   findings.    --- End of Report ---    < end of copied text >  < from: CT Chest No Cont (06.14.24 @ 00:51) >  ACC: [id number] EXAM:  CT ABDOMEN AND PELVIS   ORDERED BY:  BRIEN MARTINEZ     ACC: [id number] EXAM:  CT CHEST   ORDERED BY:  BRIEN MARTINEZ     PROCEDURE DATE:  06/14/2024          INTERPRETATION:  CLINICAL INFORMATION: Bradycardia, cardiac arrest,   distended abdomen    COMPARISON: CT abdomen and pelvis 3/17/2024.    CONTRAST/COMPLICATIONS:  IV Contrast: NONE  Oral Contrast: NONE  Complications: None reported at time of study completion    PROCEDURE:  CT of the Chest, Abdomen and Pelvis was performed.  Sagittal and coronal reformats were performed.    FINDINGS:  CHEST:  LUNGS AND LARGE AIRWAYS: Patient is intubated. The endotracheal tube ends   approximately 5 cm above the sowmya. Patent central airways. No pulmonary   nodules. Centrilobular emphysema. There are linear left upper lobe and   bilateral lower lobe linear atelectatic changes.  PLEURA: No pleural effusion.  VESSELS: The ascending thoracic aorta measures 4 cm. The pulmonary trunk   measures 3.2 cm. Coronary artery calcifications. Peripheral central   venous catheter ends with the catheter tip in the right atrium.  HEART: Heart size is enlarged. No pericardial effusion.  MEDIASTINUM AND ANAYA: No lymphadenopathy.  CHEST WALL AND LOWER NECK: Thyroid gland within normallimits. No   axillary adenopathy.    ABDOMEN AND PELVIS:  LIVER: Within normal limits. Punctate calcifications near the gallbladder   bed.  BILE DUCTS: Normal caliber.  GALLBLADDER: Within normal limits.  SPLEEN: Within normal limits.  PANCREAS: Fatty involution and lobulation of the pancreas.  ADRENALS: Within normal limits.  KIDNEYS/URETERS: No hydronephrosis, hydroureter or   nephroureterolithiasis. Mild bilateral perinephric stranding, unchanged   since 3/17/2024.    BLADDER: Minimally distended. Jacques catheter in place  REPRODUCTIVE ORGANS: Prostate within normal limits.    BOWEL: This is an enteric tube in place ending in the gastric fundus.   There are numerous small bowel loops which are distended, gas- and   fluid-filled with air-fluid levels measuring up to 3.7 cm which involves   almost the entire small bowel. There is no obvious transition point.   Diameter of small bowel loops is tapering towards the terminal ileum.   Transverse and distal colon are decompressed, liquid stools are seen in   the ascending colon. Appendix is normal.  PERITONEUM/RETROPERITONEUM: No ascites, pneumoperitoneum or loculated   fluid collections.  VESSELS: Infrarenal abdominal aorta measures 2.7 cm. Atherosclerotic   changes.  LYMPH NODES: No lymphadenopathy.  ABDOMINAL WALL: Fat-containing umbilical hernia. Left inguinal hernia   fat-containing.  BONES: Degenerative changes of the spine.    IMPRESSION:  Numerous mildly distended gas-filled and fluid-filled small bowel loops   with air-fluid levels and no transition point involving the entire small   bowel is most suggestive of ileus.    No ascites or pneumoperitoneum.    Emphysema.      Preliminary report: Motion degraded exam. Emphysema. Mildly dilated loops   of small bowel with gradualtransition, most likely reflecting ileus.   Early/developing obstruction not fully excluded.        --- End of Report ---    < end of copied text >  < from: CT Head No Cont (06.14.24 @ 00:44) >  ACC: [id number] EXAM:  CT BRAIN   ORDERED BY:  BRIEN MARTINEZ     PROCEDURE DATE:  06/14/2024          INTERPRETATION:  CLINICAL INFORMATION: Bradycardia.  Cardiac arrest.    COMPARISON STUDY: MRI brain from 04/24/2019.    TECHNIQUE:  Noncontrast axial CT images were acquired through the head.  Sagittal and coronal reformats were performed.    FINDINGS:  Endotracheal and enteric tubes are partially visualized.    There are embolic coil masses in the right parasellar region and in the   region of the right middle cerebral artery bifurcation, compatible with   prior endovascular treatment of intracranial aneurysms.  There is a   pipeline stent in the proximal right middle cerebral artery.    There is no CT evidence of acute intracranial hemorrhage, mass effect or   midline shift.  No acute loss of gray matter-white matter differentiation   is identified.    An empty versus partially empty sella is incidentally noted.    Prominence of the ventricles and sulci is compatible with mild   age-related involutional changes..    The paranasal sinuses and mastoids are grossly clear.    The calvarium, skull base and orbits appear unremarkable.    There is poor dentition with numerous large dental caries throughout the   upper teeth.    IMPRESSION:  No CT evidence of acute intracranial pathology.    Please note that CT scan is insensitive for early acute ischemia.  Repeat   CT in 12-24 hours or follow-up MRI is recommended for further evaluation.    Poor dentition with numerous large dentalcaries throughout the upper   teeth.    --- End of Report ---    Bedside lung ultrasound: N/A  Bedside ECHO: difficult to visualize but normal LV and RV motion; IVC patent     Tubes/Lines: LIJ cenral line , R PICC (placed prior to hospital arrival) , L PIV  +NGT, +jacques cath changed in ICU    GLOBAL ISSUE/BEST PRACTICE:  Analgesia: Y  Sedation: Y  HOB elevation: Y  Stress ulcer prophylaxis: Y  VTE prophylaxis: SCDs  Glycemic control: Y  Nutrition: NPO    CODE STATUS: FULL CODE  Patient is a 53y old  Male who presents with a chief complaint of Acute respiratory failure with hypoxia (15 Paul 2024 13:30)    24 hour events: Patient had Tmasx 101.5 last night. Fever or low grade temp most of the night. HR has been maintaining ~150s, in afib. S/P additional dig 250 x 1, lopressor 5 x 1, continues amio gtt. Remains intubated, sedated    REVIEW OF SYSTEMS: unable to obtain 2/2 intubated/ sedated    T(F): 98.4 (06-15-24 @ 12:03), Max: 101.5 (06-14-24 @ 18:30)  HR: 120 (06-15-24 @ 14:00) (111 - 161)  BP: 102/69 (06-15-24 @ 14:00) (81/52 - 139/64)  RR: 20 (06-15-24 @ 14:00) (13 - 28)  SpO2: 100% (06-15-24 @ 14:00) (96% - 100%)  Wt(kg): --    Mode: AC/ CMV (Assist Control/ Continuous Mandatory Ventilation), RR (machine): 18, TV (machine): 500, FiO2: 30, PEEP: 5      I&O's Detail    14 Jun 2024 07:01  -  15 Paul 2024 07:00  --------------------------------------------------------  IN:    Amiodarone: 350.3 mL    EPINEPHrine: 340.8 mL    Insulin: 100 mL    IV PiggyBack: 300 mL    IV PiggyBack: 500 mL    IV PiggyBack: 100 mL    Ketamine: 238.2 mL    Lactated Ringers: 300 mL    Norepinephrine: 331.1 mL    Norepinephrine: 174.3 mL    Phenylephrine: 106.4 mL    Propofol: 130.7 mL    Sodium Bicarbonate: 1200 mL  Total IN: 4171.8 mL    OUT:    Indwelling Catheter - Urethral (mL): 2295 mL    Nasogastric/Oral tube (mL): 400 mL  Total OUT: 2695 mL    Total NET: 1476.8 mL      15 Paul 2024 07:01  -  15 Paul 2024 14:38  --------------------------------------------------------  IN:    Amiodarone: 66.8 mL    Dexmedetomidine: 15.2 mL    IV PiggyBack: 150 mL    Lactated Ringers Bolus: 500 mL    Phenylephrine: 34.2 mL    Propofol: 72.8 mL    Sodium Bicarbonate: 200 mL  Total IN: 1039 mL    OUT:    Indwelling Catheter - Urethral (mL): 425 mL    Ketamine: 0 mL  Total OUT: 425 mL    Total NET: 614 mL        PHYSICAL EXAM:  General: critically Ill appearing male +intubated/sedated  HEENT: Pupils equal, reactive to light.  Symmetric.  PULM: Clear to auscultation bilaterally  CVS: +tachycardia, irregularly irregular   ABD: mildly Distended abdomen   EXT: Wounds to the b/l LE (LLE transmetatarsal amputation)  SKIN: b/l RUE Erythematous rash appreciated to the trunk and b/l UE extremities   NEURO: PERRL, + spontaneous movements     MEDICATIONS  vancomycin  IVPB IV Intermittent    aMIOdarone Infusion IV Continuous  aMIOdarone Infusion IV Continuous  metoprolol tartrate Injectable IV Push  norepinephrine Infusion IV Continuous  phenylephrine    Infusion IV Continuous    dextrose 50% Injectable IV Push  dextrose 50% Injectable IV Push  dextrose Oral Gel Oral PRN  glucagon  Injectable IntraMuscular      acetaminophen   Oral Liquid .. Oral PRN  dexMEDEtomidine Infusion IV Continuous  propofol Infusion IV Continuous      heparin   Injectable SubCutaneous    pantoprazole  Injectable IV Push      dextrose 10% Bolus IV Bolus  dextrose 5%. IV Continuous  dextrose 5%. IV Continuous  magnesium sulfate  IVPB IV Intermittent  sodium bicarbonate  Infusion IV Continuous  sodium chloride 0.9% lock flush IV Push PRN      chlorhexidine 0.12% Liquid Oral Mucosa  chlorhexidine 2% Cloths Topical  mupirocin 2% Nasal Both Nostrils                            11.6   15.77 )-----------( 238      ( 15 Paul 2024 05:40 )             35.6       06-15    143  |  108  |  52<H>  ----------------------------<  232<H>  3.6   |  25  |  2.60<H>    Ca    8.2<L>      15 Paul 2024 05:40  Phos  2.7     06-15  Mg     1.5     06-15    TPro  5.4<L>  /  Alb  2.1<L>  /  TBili  0.5  /  DBili  x   /  AST  219<H>  /  ALT  380<H>  /  AlkPhos  107  06-15    Lactate 1.9           06-15 @ 05:40      CARDIAC MARKERS ( 14 Jun 2024 10:05 )  x     / x     / 35 U/L / x     / x      CARDIAC MARKERS ( 13 Jun 2024 23:55 )  x     / x     / x     / x     / <1.0 ng/mL      PT/INR - ( 15 Paul 2024 05:40 )   PT: 22.6 sec;   INR: 1.97 ratio         PTT - ( 14 Jun 2024 04:30 )  PTT:42.6 sec  Urinalysis Basic - ( 15 Paul 2024 05:40 )    Color: x / Appearance: x / SG: x / pH: x  Gluc: 232 mg/dL / Ketone: x  / Bili: x / Urobili: x   Blood: x / Protein: x / Nitrite: x   Leuk Esterase: x / RBC: x / WBC x   Sq Epi: x / Non Sq Epi: x / Bacteria: x      .Surgical Swab RIGHT HEEL WOUND   Numerous Citrobacter freundii complex  Moderate Staphylococcus aureus -- 06-14 @ 02:47  .Blood Blood-Peripheral   No growth at 24 hours -- 06-13 @ 22:55  .Blood Blood-Peripheral   No growth at 24 hours -- 06-13 @ 22:50      Rapid RVP Result: NotDetec (06-14 @ 17:00)    Radiology: < from: Xray Chest 1 View- PORTABLE-Urgent (Xray Chest 1 View- PORTABLE-Urgent .) (06.14.24 @ 11:42) >  ACC: [id number] EXAM:  XR CHEST PORTABLE URGENT 1V   ORDERED BY:    TEVIN BAILEY     ACC: [id number] EXAM:  XR CHEST PORTABLE IMMED 1V   ORDERED BY: ISABEL MCKAY     PROCEDURE DATE:  06/14/2024          INTERPRETATION:  AP chest on June 14, 2024 at 2:20 AM. Patient requires   intubation.    Heart magnified by technique.    Endotracheal tube and nasogastric tube are again noted. Right PICC line   remains.    There is a persistent mild left base infiltrate with slight elevation of   the left hemidiaphragm.    Left jugular line is been inserted new since June 13. Otherwise no change.    Follow-up AP chest on June 14, 2024 at 11:24 AM. No change.    IMPRESSION: Left jugular line inserted. Persistent mild left base   findings.    --- End of Report ---    < end of copied text >  < from: CT Chest No Cont (06.14.24 @ 00:51) >  ACC: [id number] EXAM:  CT ABDOMEN AND PELVIS   ORDERED BY:  BRIEN MARTINEZ     ACC: [id number] EXAM:  CT CHEST   ORDERED BY:  BRIEN MARTINEZ     PROCEDURE DATE:  06/14/2024          INTERPRETATION:  CLINICAL INFORMATION: Bradycardia, cardiac arrest,   distended abdomen    COMPARISON: CT abdomen and pelvis 3/17/2024.    CONTRAST/COMPLICATIONS:  IV Contrast: NONE  Oral Contrast: NONE  Complications: None reported at time of study completion    PROCEDURE:  CT of the Chest, Abdomen and Pelvis was performed.  Sagittal and coronal reformats were performed.    FINDINGS:  CHEST:  LUNGS AND LARGE AIRWAYS: Patient is intubated. The endotracheal tube ends   approximately 5 cm above the sowmya. Patent central airways. No pulmonary   nodules. Centrilobular emphysema. There are linear left upper lobe and   bilateral lower lobe linear atelectatic changes.  PLEURA: No pleural effusion.  VESSELS: The ascending thoracic aorta measures 4 cm. The pulmonary trunk   measures 3.2 cm. Coronary artery calcifications. Peripheral central   venous catheter ends with the catheter tip in the right atrium.  HEART: Heart size is enlarged. No pericardial effusion.  MEDIASTINUM AND ANAYA: No lymphadenopathy.  CHEST WALL AND LOWER NECK: Thyroid gland within normallimits. No   axillary adenopathy.    ABDOMEN AND PELVIS:  LIVER: Within normal limits. Punctate calcifications near the gallbladder   bed.  BILE DUCTS: Normal caliber.  GALLBLADDER: Within normal limits.  SPLEEN: Within normal limits.  PANCREAS: Fatty involution and lobulation of the pancreas.  ADRENALS: Within normal limits.  KIDNEYS/URETERS: No hydronephrosis, hydroureter or   nephroureterolithiasis. Mild bilateral perinephric stranding, unchanged   since 3/17/2024.    BLADDER: Minimally distended. Jacques catheter in place  REPRODUCTIVE ORGANS: Prostate within normal limits.    BOWEL: This is an enteric tube in place ending in the gastric fundus.   There are numerous small bowel loops which are distended, gas- and   fluid-filled with air-fluid levels measuring up to 3.7 cm which involves   almost the entire small bowel. There is no obvious transition point.   Diameter of small bowel loops is tapering towards the terminal ileum.   Transverse and distal colon are decompressed, liquid stools are seen in   the ascending colon. Appendix is normal.  PERITONEUM/RETROPERITONEUM: No ascites, pneumoperitoneum or loculated   fluid collections.  VESSELS: Infrarenal abdominal aorta measures 2.7 cm. Atherosclerotic   changes.  LYMPH NODES: No lymphadenopathy.  ABDOMINAL WALL: Fat-containing umbilical hernia. Left inguinal hernia   fat-containing.  BONES: Degenerative changes of the spine.    IMPRESSION:  Numerous mildly distended gas-filled and fluid-filled small bowel loops   with air-fluid levels and no transition point involving the entire small   bowel is most suggestive of ileus.    No ascites or pneumoperitoneum.    Emphysema.      Preliminary report: Motion degraded exam. Emphysema. Mildly dilated loops   of small bowel with gradualtransition, most likely reflecting ileus.   Early/developing obstruction not fully excluded.        --- End of Report ---    < end of copied text >  < from: CT Head No Cont (06.14.24 @ 00:44) >  ACC: [id number] EXAM:  CT BRAIN   ORDERED BY:  BRIEN MARTINEZ     PROCEDURE DATE:  06/14/2024          INTERPRETATION:  CLINICAL INFORMATION: Bradycardia.  Cardiac arrest.    COMPARISON STUDY: MRI brain from 04/24/2019.    TECHNIQUE:  Noncontrast axial CT images were acquired through the head.  Sagittal and coronal reformats were performed.    FINDINGS:  Endotracheal and enteric tubes are partially visualized.    There are embolic coil masses in the right parasellar region and in the   region of the right middle cerebral artery bifurcation, compatible with   prior endovascular treatment of intracranial aneurysms.  There is a   pipeline stent in the proximal right middle cerebral artery.    There is no CT evidence of acute intracranial hemorrhage, mass effect or   midline shift.  No acute loss of gray matter-white matter differentiation   is identified.    An empty versus partially empty sella is incidentally noted.    Prominence of the ventricles and sulci is compatible with mild   age-related involutional changes..    The paranasal sinuses and mastoids are grossly clear.    The calvarium, skull base and orbits appear unremarkable.    There is poor dentition with numerous large dental caries throughout the   upper teeth.    IMPRESSION:  No CT evidence of acute intracranial pathology.    Please note that CT scan is insensitive for early acute ischemia.  Repeat   CT in 12-24 hours or follow-up MRI is recommended for further evaluation.    Poor dentition with numerous large dentalcaries throughout the upper   teeth.    --- End of Report ---      Tubes/Lines: LIJ cenral line , R PICC (placed prior to hospital arrival) , L PIV  +NGT, +jacques cath changed in ICU    GLOBAL ISSUE/BEST PRACTICE:  Analgesia: Y  Sedation: Y  HOB elevation: Y  Stress ulcer prophylaxis: Y  VTE prophylaxis: SCDs  Glycemic control: Y  Nutrition: NPO    CODE STATUS: FULL CODE

## 2024-06-15 NOTE — PROGRESS NOTE ADULT - ASSESSMENT
53 year old male with a PMH of AF on Eliquis, indwelling jacques, CAD s/p stents, CVA, DM, HTN, hx PE, recent hospitalization discharged yesterday after an admission for R heel osteomyelitis w/ debridement who presented to the ED from [facility] for difficulty breathing.    Problem list:  Asystolic cardiac arrest  Acute hypoxic respiratory failure  Shock (septic v cardiogenic)   Lactic/respiratory acidosis  OBI  Transaminitis  Hypomagnesemia  Afib  Elevated INR    Neuro: Acute metabolic encephalopathy 2/2 ?shock vs. anoxic injury   - D/C ketamine; start precedex, continue propofol for sedation for patient comfort.    - Wean as tolerated     CV: Cardiogenic shock 2/2 bradycardia vs distributive shock 2/2 anaphylactic reaction to ertapenem.   - Per medication review w/ [facility] eMAR, patient received total cardizem ER 720mg and Toprol XL 200mg in last 24 hrs  --> ?cardiogenic shock 2/2 bradycardia  - Calcium gluconate to stabilize cardiac membrane and BB toxicity  -?distributive shock 2/2 anaphylaxis to ertapenem  - Continue phenylephrine, maintain MAP>65   - EKG on arrival with slow afib but no signs of ischemia.   - Now uncontrolled afib overnight; given digoxin 250 mcg x 2 total, amiodarone gtt --> s/p lopressor 5mg x 1, with improvements in rates; start lopressor 5mg q4h   - Coag panel with still elevated INR from home eliquis dose, will hold off full dose anticoag until recheck coag panel in AM; start prophylactic heparin dose now  - Initial trop wnl, repeat elevated 114.3 --> 110  - Echo from March w/ normal LVEF.    - TTE 6/14: Left ventricular systolic function is normal with an ejection fraction visually estimated at 55 to 60 %. There are no regional wall motion abnormalities seen. Moderate to severe left ventricular hypertrophy. Normal right ventricular cavity size, with normal wall thickness, and normal systolic function. The left atrium is severely dilated. The right atrium is severely dilated.  - Cardiology following    Pulm: Acute hypoxic respiratory failure /resp acidosis  - C/w full vent support.   - Initially ABG metabolic acidosis, repeat ABG 7.44/39/118/26, improving   - C/w bicarb gtt  - SBTs as able.     GI:   - NPO. given multiple dilated bowel loops on CT  - CT AP: Numerous mildly distended gas-filled and fluid-filled small bowel loops with air-fluid levels and no transition point involving the entire small bowel is most suggestive of ileus, ascites or pneumoperitoneum. Emphysema.  - NGT to suction, now minimal drainage   - Start trickle tube feeds, nepro   - Patient now with diarrhea; d/c naloxegrol, senna, miralax   - c/w ppi for GI prophylaxis.   - Transaminitis likely shock liver, improving LFTs     Renal: OBI likely 2/2 ATN from cardiac arrest vs prerenal   - Cr remains elevated, now downtrending   - Lactate downtrending, now wnl   - Profound lactic acidosis likely from cardiac arrest   - S/p 1L LR bolus   - Hypomagnesemia, hypokalemia repleted today   - Replete lytes PRN  K>4, Mg>2.     ID: ongoing treatment for OM s/p R heel debridement during prior hospitalization  - Was receiving ertapenem and vancomycin, however as per prior ID notes ertapenem was stopped due to development of rash.    - Wound/tissue cultures from prior admission with MRSA, ESBL E.coli, E faecalis.    - Re-dose Vanc; levels 16.1 -> 17.5 this AM   - BCx NGTD 24h  - UA large LE, neg nitrites, WBC TNTC, few bacteria; UCx pending   - Will consider initiation of abx based on patient clinical course   - Start mupirocin   - ID following    Endocrine: Hx of DM  - Insulin gtt discontinued   - Monitor FS q6h   - TSH elevated. T3/T4 wnl; likely 2/2 recovering from sick euthyroid syndrome    Heme:  - On Eliquis as an outpatient.   - INR elevated 2.33 -> 1.97 Continue to holding for now  - Start heparin prophylactic dose  53 year old male with a PMH of AF on Eliquis, indwelling jacques, CAD s/p stents, CVA, DM, HTN, hx PE, recent hospitalization discharged yesterday after an admission for R heel osteomyelitis w/ debridement who presented to the ED from [facility] for difficulty breathing.    Problem list:  Asystolic cardiac arrest  Acute hypoxic respiratory failure  Shock (septic v cardiogenic)   Lactic/respiratory acidosis  OBI  Transaminitis  Hypomagnesemia  Afib  Elevated INR    Neuro: Acute metabolic encephalopathy 2/2 ?shock vs. anoxic injury   - D/C ketamine; start precedex, continue propofol for sedation for patient comfort.    - Wean as tolerated     CV: Cardiogenic shock 2/2 bradycardia vs distributive shock 2/2 anaphylactic reaction to ertapenem.   - Per medication review w/ [facility] eMAR, patient received total cardizem ER 720mg and Toprol XL 200mg in last 24 hrs  --> ?cardiogenic shock 2/2 bradycardia  - Calcium gluconate to stabilize cardiac membrane and BB toxicity  -?distributive shock 2/2 anaphylaxis to ertapenem  - Continue phenylephrine, maintain MAP>65   - EKG on arrival with slow afib but no signs of ischemia.   - Now uncontrolled afib overnight; given digoxin 250 mcg x 2 total, amiodarone gtt --> s/p lopressor 5mg x 1, with improvements in rates; start lopressor 5mg q4h   - Coag panel with still elevated INR from home eliquis dose, will hold off full dose anticoag until recheck coag panel in AM; start prophylactic heparin dose now  - Initial trop wnl, repeat elevated 114.3 --> 110  - Echo from March w/ normal LVEF.    - TTE 6/14: Left ventricular systolic function is normal with an ejection fraction visually estimated at 55 to 60 %. There are no regional wall motion abnormalities seen. Moderate to severe left ventricular hypertrophy. Normal right ventricular cavity size, with normal wall thickness, and normal systolic function. The left atrium is severely dilated. The right atrium is severely dilated.  - Cardiology following    Pulm: Acute hypoxic respiratory failure /resp acidosis  - C/w full vent support.   - Initially ABG metabolic acidosis, repeat ABG 7.44/39/118/26, improving   - D/C bicarb gtt   - SBTs as able.     GI:   - NPO. given multiple dilated bowel loops on CT  - CT AP: Numerous mildly distended gas-filled and fluid-filled small bowel loops with air-fluid levels and no transition point involving the entire small bowel is most suggestive of ileus, ascites or pneumoperitoneum. Emphysema.  - NGT to suction, now minimal drainage   - Start trickle tube feeds, nepro   - Patient now with diarrhea; d/c naloxegrol, senna, miralax   - c/w ppi for GI prophylaxis.   - Transaminitis likely shock liver, improving LFTs     Renal: OBI likely 2/2 ATN from cardiac arrest vs prerenal   - Cr remains elevated, now downtrending   - Lactate downtrending, now wnl   - Profound lactic acidosis likely from cardiac arrest   - S/p 1L LR bolus   - Hypomagnesemia, hypokalemia repleted today   - Replete lytes PRN  K>4, Mg>2.     ID: ongoing treatment for OM s/p R heel debridement during prior hospitalization  - Was receiving ertapenem and vancomycin, however as per prior ID notes ertapenem was stopped due to development of rash.    - Wound/tissue cultures from prior admission with MRSA, ESBL E.coli, E faecalis.    - Re-dose Vanc; levels 16.1 -> 17.5 this AM   - BCx NGTD 24h  - UA large LE, neg nitrites, WBC TNTC, few bacteria; UCx pending   - Will consider initiation of abx based on patient clinical course   - Start mupirocin   - ID following    Endocrine: Hx of DM  - Insulin gtt discontinued   - Monitor FS q6h   - TSH elevated. T3/T4 wnl; likely 2/2 recovering from sick euthyroid syndrome    Heme:  - On Eliquis as an outpatient.   - INR elevated 2.33 -> 1.97 Continue to holding for now  - Start heparin prophylactic dose

## 2024-06-15 NOTE — PROGRESS NOTE ADULT - ASSESSMENT
54 YO M with past medical history of  AFib (on Eliquis), indwelling Aguilera, cerebral aneurysm, CAD (s/p stents), CVA, T2DM, HTN, OM (s/p debridement), PE, perforated gastric ulcer s/p ometnopexy 2019 with Dr. Mejia, transferred from [facility] for difficulty in breathing.  His recent admission was for osteomyelitis and discharged on IV Vancomycin, admitted s/p cardiac arrest x2 w/ AHRF admitted to the ICU currently intubated, sedated, on levophed and epinephrine, NGT placed in the ED. Cardiology consulted for cardiac arrest and afib.     - No clear evidence of acute ischemia  -asa and statin when able    -arrest and johnny issues likely iatrogenic from double dosing of avn blockers  -hr increased, prob aggravated by pressors, and meds wearing off  -given was still pressor dependent, rate control was attempted with dig and amio  -now that pressors minimized and on primarily piero, would try metop iv for rate   -when able would plan to resume nodals step wise, with approx half doses of bb and ccb in divided doses iv, noting apparent ileus  -1/2 dose equiv would roughly be dilt ivp 5 and gtt at 5, and metop 2.5 iv q6. given present dose of metop would consider the addition of low dose dilt for further hr control over the course of today, if bp will allow   -ac when able  -normal ef and mod-sev mr on echo, no need to repeat now     Upon my evaluation, this patient is at high risk for imminent or life threatening deterioration due to resp failure, hypotension,  and other active medical issues which require my direct attention, intervention, and personal management.  I have personally spent >70 minutes  of critical care time exclusive of time spent on separate billing procedures. This includes review of laboratory data, radiology results, discussion with primary team, and monitoring for potential decompensation Interventions were performed as documented above.

## 2024-06-15 NOTE — PROVIDER CONTACT NOTE (EICU) - ACTION/TREATMENT ORDERED:
vent orders updated to reflect current vent settings noted during eICU vent rounds
vent orders updated to reflect current ventilator settings noted during eICU ventilator rounds.

## 2024-06-15 NOTE — PROGRESS NOTE ADULT - SUBJECTIVE AND OBJECTIVE BOX
[facility]    Brittany Lutz, Reynaldo Sweeney, Román, Francesco      [phone]    CHIEF COMPLAINT: Patient is a 53y old  Male who presents with a chief complaint of Acute respiratory failure with hypoxia (15 Paul 2024 09:29)      Follow Up: johnny, rapid af    Interim history: Unable to provide a history on the basis of a poor mental status.  Events noted. has been having rapid hr in af, s/p dig and amio without benefit    MEDICATIONS  (STANDING):  aMIOdarone Infusion 1 mG/Min (33.3 mL/Hr) IV Continuous <Continuous>  aMIOdarone Infusion 0.5 mG/Min (16.7 mL/Hr) IV Continuous <Continuous>  chlorhexidine 0.12% Liquid 15 milliLiter(s) Oral Mucosa every 12 hours  chlorhexidine 2% Cloths 1 Application(s) Topical daily  dexMEDEtomidine Infusion 0.6 MICROgram(s)/kG/Hr (15.2 mL/Hr) IV Continuous <Continuous>  dextrose 10% Bolus 125 milliLiter(s) IV Bolus once  dextrose 5%. 1000 milliLiter(s) (100 mL/Hr) IV Continuous <Continuous>  dextrose 5%. 1000 milliLiter(s) (50 mL/Hr) IV Continuous <Continuous>  dextrose 50% Injectable 25 Gram(s) IV Push once  dextrose 50% Injectable 12.5 Gram(s) IV Push once  glucagon  Injectable 1 milliGRAM(s) IntraMuscular once  heparin   Injectable 5000 Unit(s) SubCutaneous every 8 hours  magnesium sulfate  IVPB 2 Gram(s) IV Intermittent every 4 hours  metoprolol tartrate Injectable 5 milliGRAM(s) IV Push every 4 hours  mupirocin 2% Nasal 1 Application(s) Both Nostrils two times a day  norepinephrine Infusion 0.35 MICROgram(s)/kG/Min (33.2 mL/Hr) IV Continuous <Continuous>  pantoprazole  Injectable 40 milliGRAM(s) IV Push daily  phenylephrine    Infusion 0.1 MICROgram(s)/kG/Min (3.8 mL/Hr) IV Continuous <Continuous>  propofol Infusion 5 MICROgram(s)/kG/Min (2.45 mL/Hr) IV Continuous <Continuous>  sodium bicarbonate  Infusion 0.074 mEq/kG/Hr (50 mL/Hr) IV Continuous <Continuous>  vancomycin  IVPB 750 milliGRAM(s) IV Intermittent once    MEDICATIONS  (PRN):  dextrose Oral Gel 15 Gram(s) Oral once PRN Blood Glucose LESS THAN 70 milliGRAM(s)/deciliter  sodium chloride 0.9% lock flush 10 milliLiter(s) IV Push every 1 hour PRN Pre/post blood products, medications, blood draw, and to maintain line patency      REVIEW OF SYSTEMS: unable to provide  Vital Signs Last 24 Hrs  T(C): 36.9 (15 Paul 2024 12:03), Max: 38.6 (14 Jun 2024 18:30)  T(F): 98.4 (15 Paul 2024 12:03), Max: 101.5 (14 Jun 2024 18:30)  HR: 111 (15 Paul 2024 12:01) (111 - 171)  BP: 86/57 (15 Paul 2024 11:33) (81/52 - 139/64)  BP(mean): 66 (15 Paul 2024 11:33) (60 - 97)  RR: 22 (15 Paul 2024 11:33) (13 - 28)  SpO2: 100% (15 Paul 2024 12:01) (96% - 100%)    Parameters below as of 15 Paul 2024 08:00  Patient On (Oxygen Delivery Method): ventilator    O2 Concentration (%): 30    I&O's Summary    14 Jun 2024 07:01  -  15 Paul 2024 07:00  --------------------------------------------------------  IN: 4171.8 mL / OUT: 2695 mL / NET: 1476.8 mL    15 Paul 2024 07:01  -  15 Paul 2024 12:06  --------------------------------------------------------  IN: 1039 mL / OUT: 425 mL / NET: 614 mL        Telemetry past 24h: af rvr    PHYSICAL EXAM:    Constitutional: well-nourished, well-developed, NAD   HEENT:  ngt, ogt, sclerae anicteric, conjunctivae clear, no oral cyanosis.  Pulmonary: Non-labored, breath sounds are clear bilaterally, No wheezing, rales or rhonchi  Cardiovascular: tachy irregular, S1 and S2.  No murmur.  No rubs, gallops or clicks  Gastrointestinal: Bowel Sounds present, soft, nontender.   Lymph: mild peripheral edema. suzie distal amp  Neurological: unable to evaluate, poor mental status  Skin: No rashes.  Psych:  Mood & affect not evaluable    LABS: All Labs Reviewed:                        11.6   15.77 )-----------( 238      ( 15 Paul 2024 05:40 )             35.6                         13.7   34.23 )-----------( 446      ( 14 Jun 2024 04:30 )             45.0                         13.6   23.52 )-----------( 320      ( 13 Jun 2024 22:55 )             46.4     15 Paul 2024 05:40    143    |  108    |  52     ----------------------------<  232    3.6     |  25     |  2.60   14 Jun 2024 13:37    139    |  106    |  50     ----------------------------<  371    3.8     |  19     |  3.00   14 Jun 2024 10:05    139    |  103    |  51     ----------------------------<  457    3.0     |  14     |  3.50     Ca    8.2        15 Paul 2024 05:40  Ca    8.0        14 Jun 2024 13:37  Ca    8.1        14 Jun 2024 10:05  Phos  2.7       15 Paul 2024 05:40  Phos  0.8       14 Jun 2024 10:05  Phos  2.8       14 Jun 2024 04:30  Mg     1.5       15 Paul 2024 05:40  Mg     1.9       14 Jun 2024 10:05  Mg     2.2       14 Jun 2024 04:30    TPro  5.4    /  Alb  2.1    /  TBili  0.5    /  DBili  x      /  AST  219    /  ALT  380    /  AlkPhos  107    15 Paul 2024 05:40  TPro  6.8    /  Alb  2.7    /  TBili  0.7    /  DBili  x      /  AST  385    /  ALT  435    /  AlkPhos  152    14 Jun 2024 04:30  TPro  5.0    /  Alb  1.8    /  TBili  0.6    /  DBili  x      /  AST  207    /  ALT  203    /  AlkPhos  99     13 Jun 2024 23:55    PT/INR - ( 15 Paul 2024 05:40 )   PT: 22.6 sec;   INR: 1.97 ratio         PTT - ( 14 Jun 2024 04:30 )  PTT:42.6 sec  CARDIAC MARKERS ( 14 Jun 2024 10:05 )  x     / x     / 35 U/L / x     / x      CARDIAC MARKERS ( 13 Jun 2024 23:55 )  x     / x     / x     / x     / <1.0 ng/mL      Blood Culture: Organism --  Gram Stain Blood -- Gram Stain --  Specimen Source .Blood Blood-Peripheral  Culture-Blood --    Organism --  Gram Stain Blood -- Gram Stain --  Specimen Source .Blood Blood-Peripheral  Culture-Blood --        06-13 @ 23:55  TSH: 7.84      RADIOLOGY:    EKG:    Echo:    < from: TTE Limited W or WO Ultrasound Enhancing Agent (06.14.24 @ 10:05) >    TRANSTHORACIC ECHOCARDIOGRAM REPORT  ________________________________________________________________________________                                      _______       Pt. Name:       SARAH MORRISON Study Date:    6/14/2024  MRN:            [id number]         YOB: 1970  Accession #:    [id number]        Age:           53 years  Account#:       [id number]       Gender:        M  Visit ID#  Heart Rate:                      Height:        72.05 in (183.00 cm)  Rhythm:          Weight:        222.66 lb (101.00 kg)  Blood Pressure: 88/53 mmHg       BSA/BMI:       2.23 m² / 30.16 kg/m²  ________________________________________________________________________________________  Referring Physician:    [id number] Hill Brizuela  Interpreting Physician: Rahel Boss MD  Primary Sonographer:    Mounika FELDMAN    CPT:               ECHO TTE WO CON COMP W DOPP - 53012.m  Indication(s):     Heart failure, unspecified - I50.9  Procedure:         Transthoracic echocardiogram with 2-D, M-mode and complete                     spectral and color flow Doppler.  Ordering Location: ICU1  Admission Status:  Inpatient    _______________________________________________________________________________________     CONCLUSIONS:      1. Left ventricular cavity is normal in size. Left ventricular systolic function is normal with an ejection fraction visually estimated at 55 to 60 %. There are no regional wall motion abnormalities seen.   2. Moderate to severe left ventricular hypertrophy.   3. Normal right ventricular cavity size, with normal wall thickness, and normal systolic function.   4. The left atrium is severely dilated.   5. The right atrium is severely dilated.   6. Trileaflet aortic valve with normal systolic excursion. There is focal calcification of the aortic valve leaflets.   7. Mild to moderate mitral regurgitation.   8. There is moderate calcification of the mitral valve annulus.   9. Structurally normal tricuspid valve with normal leaflet excursion.Mild tricuspid regurgitation.  10. The inferior vena cava is normal in size measuring 1.70 cm in diameter, (normal <2.1cm) with normal inspiratory collapse (normal >50%) consistent with normal right atrial pressure (~3, range 0-5mmHg).  11. Estimatedpulmonary artery systolic pressure is 43 mmHg.    ________________________________________________________________________________________  FINDINGS:     Left Ventricle:  The left ventricular cavity is normal in size. Left ventricular systolic function is normal with an ejection fraction visually estimated at 55 to 60%. There are no regional wall motion abnormalities seen. There is normal left ventricular diastolic function, with normal filling pressure. Moderate to severe left ventricular hypertrophy.     Right Ventricle:  The right ventricular cavity is normal in size, with normal wall thickness and normal systolic function.     Left Atrium:  The left atrium is severely dilated.     Right Atrium:  The right atrium is severely dilated.     Aortic Valve:  The aortic valve appears trileaflet with normal systolic excursion. There is focal calcification of the aortic valve leaflets. There is no aortic valve stenosis. There is no evidence of aortic regurgitation.     Mitral Valve:  There is moderate calcification of the mitral valve annulus. There is mild to moderate mitral regurgitation.     Tricuspid Valve:  Structurally normal tricuspid valve with normal leaflet excursion. There is mild tricuspid regurgitation. Estimated pulmonary artery systolic pressure is 43 mmHg.     Pulmonic Valve:  The pulmonic valve was not well visualized.     Systemic Veins:  The inferior vena cava is normal in size measuring 1.70 cm in diameter, (normal <2.1cm) with normal inspiratory collapse (normal >50%) consistent with normal right atrial pressure (~3, range 0-5mmHg).  ____________________________________________________________________  QUANTITATIVE DATA:  Left Ventricle Measurements: (Indexed to BSA)     IVSd (2D):   1.7 cm  LVPWd (2D):  1.4 cm  LVIDd (2D):  4.3 cm  LVIDs (2D):  3.0 cm  LV Mass:     281 g  125.9 g/m²  Visualized LV EF%: 55 to 60%     MV E Vmax:    1.07 m/s  MV A Vmax:    0.00 m/s  e' lateral:   9.68 cm/s  e' medial:    10.90 cm/s  E/e' lateral: 11.05  E/e' medial:  9.82  E/e' Average: 10.40  MV DT:        180 msec    Aorta Measurements: (Normal range) (Indexed to BSA)     Sinuses of Valsalva: 3.40 cm (3.1 - 3.7 cm)       Left Atrium Measurements: (Indexed to BSA)  LA Diam 2D: 4.00 cm       LVOT / RVOT/ Qp/Qs Data: (Indexed to BSA)  LVOT Diameter: 2.10 cm  LVOT Area:     3.46 cm²    Mitral Valve Measurements:     MV E Vmax: 1.1 m/s  MV A Vmax: 0.0 m/s       Tricuspid Valve Measurements:     TR Vmax:          3.2 m/s  TR Peak Gradient: 40.4 mmHg  RA Pressure:      3 mmHg  PASP:             43 mmHg    ________________________________________________________________________________________  Electronically signed on 6/14/2024 at 12:08:59 PM by Rahel Boss MD         *** Final ***    < end of copied text >

## 2024-06-15 NOTE — PHARMACOTHERAPY INTERVENTION NOTE - COMMENTS
Vancomycin Dosing Per Pharmacy:    1. Indication for the vancomycin: R heel wound/osteomyelitis  2. Requesting Provider: Dr. Whitten  3. Current plan and dosing regimen: 750 mg x 1 dose   4. Day of therapy: 12  5. Cultures: tissue culture 5/26/24: E faecalis (susceptible to vancomycin) and MRSA. Tissue culture 06/04/24: ESBL E coli and E faecalis susceptible to vancomycin  6. Target trough or AUC/BAYLEE: 400-600  7. Day and time level is due: 6/16 at 05:00  8. Previous levels: 6/4 (19:31): 22, 6/5 (05:47): 14, 6/6 (20:20): 14, 6/9 (04:58): 14.4, 6/11 (04:30): 15.7, 6/13 (05:54): 17.3, 6/14 (01:25): 15.2, 6/14 (04:30): 16.1, 6/15 (5:40): 17.5  9. Expected 24-hour AUC: 544   Vancomycin Dosing Per Pharmacy:    1. Indication for the vancomycin: R heel wound/osteomyelitis  2. Requesting Provider: Dr. Whitten (discussed with Dr. Muniz)  3. Current plan and dosing regimen: 750 mg x 1 dose   4. Day of therapy: 12  5. Cultures: tissue culture 5/26/24: E faecalis (susceptible to vancomycin) and MRSA. Tissue culture 06/04/24: ESBL E coli and E faecalis susceptible to vancomycin  6. Target trough or AUC/BAYLEE: 400-600  7. Day and time level is due: 6/16 at 05:00  8. Previous levels: 6/4 (19:31): 22, 6/5 (05:47): 14, 6/6 (20:20): 14, 6/9 (04:58): 14.4, 6/11 (04:30): 15.7, 6/13 (05:54): 17.3, 6/14 (01:25): 15.2, 6/14 (04:30): 16.1, 6/15 (5:40): 17.5  9. Expected 24-hour AUC: 544

## 2024-06-15 NOTE — CHART NOTE - NSCHARTNOTEFT_GEN_A_CORE
: Cristy      INDICATION: shock, uncontrolled afib      PROCEDURE:    [x ] LIMITED ECHO    [ ] LIMITED CHEST      FINDINGS:  limited by uncontrolled afib  LV appears acceptable  no pericardial effusion  IVC 1.8cm with some respiratory variation, <50% collapse      INTERPRETATION:  volume status equivocal      Images stored on [employer]PATH

## 2024-06-15 NOTE — PHARMACOTHERAPY INTERVENTION NOTE - COMMENTS
67 yo F admitted to the ICU. Discussed with ICU team and recommended nasal mupirocin for S. aureus decolonization as 6/14 MRSA/MSSA PCR resulted positive. Recommendation accepted and order entered.

## 2024-06-15 NOTE — PROGRESS NOTE ADULT - SUBJECTIVE AND OBJECTIVE BOX
Subjective: intubated, FiO2 30%. Still on Levo      MEDICATIONS  (STANDING):  aMIOdarone Infusion 1 mG/Min (33.3 mL/Hr) IV Continuous <Continuous>  aMIOdarone Infusion 0.5 mG/Min (16.7 mL/Hr) IV Continuous <Continuous>  chlorhexidine 0.12% Liquid 15 milliLiter(s) Oral Mucosa every 12 hours  chlorhexidine 2% Cloths 1 Application(s) Topical daily  dextrose 10% Bolus 125 milliLiter(s) IV Bolus once  dextrose 5%. 1000 milliLiter(s) (100 mL/Hr) IV Continuous <Continuous>  dextrose 5%. 1000 milliLiter(s) (50 mL/Hr) IV Continuous <Continuous>  dextrose 50% Injectable 25 Gram(s) IV Push once  dextrose 50% Injectable 12.5 Gram(s) IV Push once  glucagon  Injectable 1 milliGRAM(s) IntraMuscular once  magnesium sulfate  IVPB 2 Gram(s) IV Intermittent every 4 hours  naloxegol 25 milliGRAM(s) Oral daily  norepinephrine Infusion 0.35 MICROgram(s)/kG/Min (33.2 mL/Hr) IV Continuous <Continuous>  pantoprazole  Injectable 40 milliGRAM(s) IV Push daily  phenylephrine    Infusion 0.1 MICROgram(s)/kG/Min (3.8 mL/Hr) IV Continuous <Continuous>  polyethylene glycol 3350 17 Gram(s) Oral daily  propofol Infusion 5 MICROgram(s)/kG/Min (2.45 mL/Hr) IV Continuous <Continuous>  senna 2 Tablet(s) Oral at bedtime  sodium bicarbonate  Infusion 0.074 mEq/kG/Hr (50 mL/Hr) IV Continuous <Continuous>    MEDICATIONS  (PRN):  dextrose Oral Gel 15 Gram(s) Oral once PRN Blood Glucose LESS THAN 70 milliGRAM(s)/deciliter  sodium chloride 0.9% lock flush 10 milliLiter(s) IV Push every 1 hour PRN Pre/post blood products, medications, blood draw, and to maintain line patency          T(C): 37.8 (06-15-24 @ 07:58), Max: 38.6 (06-14-24 @ 18:30)  HR: 136 (06-15-24 @ 09:00) (123 - 171)  BP: 101/60 (06-15-24 @ 09:00) (86/63 - 139/64)  RR: 16 (06-15-24 @ 09:00) (13 - 28)  SpO2: 100% (06-15-24 @ 09:00) (96% - 100%)  Wt(kg): --    ABG - ( 14 Jun 2024 09:33 )  pH, Arterial: 7.25  pH, Blood: x     /  pCO2: 28    /  pO2: 88    / HCO3: 12    / Base Excess: -14.9 /  SaO2: 98.0                I&O's Detail    14 Jun 2024 07:01  -  15 Paul 2024 07:00  --------------------------------------------------------  IN:    Amiodarone: 350.3 mL    EPINEPHrine: 340.8 mL    Insulin: 100 mL    IV PiggyBack: 300 mL    IV PiggyBack: 500 mL    IV PiggyBack: 100 mL    Ketamine: 238.2 mL    Lactated Ringers: 300 mL    Norepinephrine: 331.1 mL    Norepinephrine: 174.3 mL    Phenylephrine: 106.4 mL    Propofol: 130.7 mL    Sodium Bicarbonate: 1200 mL  Total IN: 4171.8 mL    OUT:    Indwelling Catheter - Urethral (mL): 2295 mL    Nasogastric/Oral tube (mL): 400 mL  Total OUT: 2695 mL    Total NET: 1476.8 mL      15 Paul 2024 07:01  -  15 Paul 2024 09:29  --------------------------------------------------------  IN:    Amiodarone: 33.4 mL    IV PiggyBack: 50 mL    Phenylephrine: 19 mL    Propofol: 36.4 mL    Sodium Bicarbonate: 100 mL  Total IN: 238.8 mL    OUT:    Indwelling Catheter - Urethral (mL): 200 mL    Ketamine: 0 mL  Total OUT: 200 mL    Total NET: 38.8 mL          Mode: AC/ CMV (Assist Control/ Continuous Mandatory Ventilation)  RR (machine): 18  TV (machine): 500  FiO2: 30  PEEP: 5  ITime: 1  MAP: 11  PIP: 24       PHYSICAL EXAM:    GENERAL: Intubated.   NECK: Supple, no inc in JVP  CHEST/LUNG: dec BS  HEART: S1S2  ABDOMEN: Soft  EXTREMITIES:  pre-tibial edema. BL leg wounds, L foot partial amputation.       LABS:  CBC Full  -  ( 15 Paul 2024 05:40 )  WBC Count : 15.77 K/uL  RBC Count : 3.80 M/uL  Hemoglobin : 11.6 g/dL  Hematocrit : 35.6 %  Platelet Count - Automated : 238 K/uL  Mean Cell Volume : 93.7 fl  Mean Cell Hemoglobin : 30.5 pg  Mean Cell Hemoglobin Concentration : 32.6 gm/dL  Auto Neutrophil # : 12.98 K/uL  Auto Lymphocyte # : 1.00 K/uL  Auto Monocyte # : 1.39 K/uL  Auto Eosinophil # : 0.28 K/uL  Auto Basophil # : 0.03 K/uL  Auto Neutrophil % : 82.3 %  Auto Lymphocyte % : 6.3 %  Auto Monocyte % : 8.8 %  Auto Eosinophil % : 1.8 %  Auto Basophil % : 0.2 %    06-15    143  |  108  |  52<H>  ----------------------------<  232<H>  3.6   |  25  |  2.60<H>    Ca    8.2<L>      15 Paul 2024 05:40  Phos  2.7     06-15  Mg     1.5     06-15    TPro  5.4<L>  /  Alb  2.1<L>  /  TBili  0.5  /  DBili  x   /  AST  219<H>  /  ALT  380<H>  /  AlkPhos  107  06-15    PT/INR - ( 15 Paul 2024 05:40 )   PT: 22.6 sec;   INR: 1.97 ratio           Impression:  1.OBI: ATN. Improving from peak Cr of 3.5, good UO  2.Metabolic acidosis: OBI, Lactic acidosis  3.s/p Cardiac arrest  4.Diabetes, PAD    Recommendations:   Volume, vasopressor management per PCCM  Avoid hypotension  Trend Cr, UO

## 2024-06-15 NOTE — PROGRESS NOTE ADULT - SUBJECTIVE AND OBJECTIVE BOX
Date of Service 06-15-24 @ 13:33    Patient is a 53y old  Male who presents with a chief complaint of Acute respiratory failure with hypoxia (15 Paul 2024 09:29)      INTERVAL /OVERNIGHT EVENTS: still vented and on pressor support    MEDICATIONS  (STANDING):  aMIOdarone Infusion 1 mG/Min (33.3 mL/Hr) IV Continuous <Continuous>  aMIOdarone Infusion 0.5 mG/Min (16.7 mL/Hr) IV Continuous <Continuous>  chlorhexidine 0.12% Liquid 15 milliLiter(s) Oral Mucosa every 12 hours  chlorhexidine 2% Cloths 1 Application(s) Topical daily  dexMEDEtomidine Infusion 0.6 MICROgram(s)/kG/Hr (15.2 mL/Hr) IV Continuous <Continuous>  dextrose 10% Bolus 125 milliLiter(s) IV Bolus once  dextrose 5%. 1000 milliLiter(s) (100 mL/Hr) IV Continuous <Continuous>  dextrose 5%. 1000 milliLiter(s) (50 mL/Hr) IV Continuous <Continuous>  dextrose 50% Injectable 25 Gram(s) IV Push once  dextrose 50% Injectable 12.5 Gram(s) IV Push once  glucagon  Injectable 1 milliGRAM(s) IntraMuscular once  heparin   Injectable 5000 Unit(s) SubCutaneous every 8 hours  magnesium sulfate  IVPB 2 Gram(s) IV Intermittent every 4 hours  metoprolol tartrate Injectable 5 milliGRAM(s) IV Push every 4 hours  mupirocin 2% Nasal 1 Application(s) Both Nostrils two times a day  norepinephrine Infusion 0.35 MICROgram(s)/kG/Min (33.2 mL/Hr) IV Continuous <Continuous>  pantoprazole  Injectable 40 milliGRAM(s) IV Push daily  phenylephrine    Infusion 0.1 MICROgram(s)/kG/Min (3.8 mL/Hr) IV Continuous <Continuous>  propofol Infusion 5 MICROgram(s)/kG/Min (2.45 mL/Hr) IV Continuous <Continuous>  sodium bicarbonate  Infusion 0.074 mEq/kG/Hr (50 mL/Hr) IV Continuous <Continuous>  vancomycin  IVPB 750 milliGRAM(s) IV Intermittent once    MEDICATIONS  (PRN):  dextrose Oral Gel 15 Gram(s) Oral once PRN Blood Glucose LESS THAN 70 milliGRAM(s)/deciliter  sodium chloride 0.9% lock flush 10 milliLiter(s) IV Push every 1 hour PRN Pre/post blood products, medications, blood draw, and to maintain line patency      Allergies    ertapenem (Blisters; Rash)  fish (Hives)    Intolerances        REVIEW OF SYSTEMS:  unable to obtain    Vital Signs Last 24 Hrs  T(C): 36.9 (15 Paul 2024 12:03), Max: 38.6 (14 Jun 2024 18:30)  T(F): 98.4 (15 Paul 2024 12:03), Max: 101.5 (14 Jun 2024 18:30)  HR: 119 (15 Paul 2024 12:30) (111 - 171)  BP: 97/55 (15 Paul 2024 12:30) (81/52 - 139/64)  BP(mean): 70 (15 Paul 2024 12:30) (60 - 97)  RR: 17 (15 Paul 2024 12:30) (13 - 28)  SpO2: 100% (15 Paul 2024 12:30) (96% - 100%)    Parameters below as of 15 Paul 2024 08:00  Patient On (Oxygen Delivery Method): ventilator    O2 Concentration (%): 30    PHYSICAL EXAM:  GENERAL: NAD, well-groomed, well-developed  HEAD:  Atraumatic, Normocephalic  EYES: EOMI, PERRLA, conjunctiva and sclera clear  ENMT: No tonsillar erythema, exudates, or enlargement; Moist mucous membranes, Good dentition, No lesions  NECK: Supple, No JVD, Normal thyroid  NERVOUS SYSTEM:  sedated  CHEST/LUNG: Clear to auscultation bilaterally; No rales, rhonchi, wheezing, or rubs  HEART: Regular rate and rhythm; No murmurs, rubs, or gallops  ABDOMEN: Soft, Nontender, Nondistended; Bowel sounds present  EXTREMITIES:  2+ Peripheral Pulses, No clubbing, cyanosis, or edema  LYMPH: No lymphadenopathy noted  SKIN: rash    LABS:                        11.6   15.77 )-----------( 238      ( 15 Paul 2024 05:40 )             35.6     15 Paul 2024 05:40    143    |  108    |  52     ----------------------------<  232    3.6     |  25     |  2.60     Ca    8.2        15 Paul 2024 05:40  Phos  2.7       15 Paul 2024 05:40  Mg     1.5       15 Paul 2024 05:40    TPro  5.4    /  Alb  2.1    /  TBili  0.5    /  DBili  x      /  AST  219    /  ALT  380    /  AlkPhos  107    15 Paul 2024 05:40    PT/INR - ( 15 Paul 2024 05:40 )   PT: 22.6 sec;   INR: 1.97 ratio         PTT - ( 14 Jun 2024 04:30 )  PTT:42.6 sec  Urinalysis Basic - ( 15 Paul 2024 05:40 )    Color: x / Appearance: x / SG: x / pH: x  Gluc: 232 mg/dL / Ketone: x  / Bili: x / Urobili: x   Blood: x / Protein: x / Nitrite: x   Leuk Esterase: x / RBC: x / WBC x   Sq Epi: x / Non Sq Epi: x / Bacteria: x      CAPILLARY BLOOD GLUCOSE      POCT Blood Glucose.: 249 mg/dL (15 Paul 2024 11:43)  POCT Blood Glucose.: 207 mg/dL (15 Paul 2024 09:06)  POCT Blood Glucose.: 211 mg/dL (15 Paul 2024 07:19)  POCT Blood Glucose.: 217 mg/dL (15 Paul 2024 05:06)  POCT Blood Glucose.: 187 mg/dL (15 Paul 2024 02:03)  POCT Blood Glucose.: 212 mg/dL (15 Paul 2024 00:07)  POCT Blood Glucose.: 153 mg/dL (14 Jun 2024 21:24)  POCT Blood Glucose.: 79 mg/dL (14 Jun 2024 17:57)  POCT Blood Glucose.: 79 mg/dL (14 Jun 2024 17:56)  POCT Blood Glucose.: 163 mg/dL (14 Jun 2024 16:26)  POCT Blood Glucose.: 303 mg/dL (14 Jun 2024 13:57)      RADIOLOGY & ADDITIONAL TESTS:    Notes Reviewed:  [x ] YES  [ ] NO    Care Discussed with Consultants/Other Providers [x ] YES  [ ] NO

## 2024-06-16 LAB
-  AMOXICILLIN/CLAVULANIC ACID: SIGNIFICANT CHANGE UP
-  AMPICILLIN/SULBACTAM: SIGNIFICANT CHANGE UP
-  AMPICILLIN: SIGNIFICANT CHANGE UP
-  AZTREONAM: SIGNIFICANT CHANGE UP
-  CEFAZOLIN: SIGNIFICANT CHANGE UP
-  CEFEPIME: SIGNIFICANT CHANGE UP
-  CEFOXITIN: SIGNIFICANT CHANGE UP
-  CEFTRIAXONE: SIGNIFICANT CHANGE UP
-  CIPROFLOXACIN: SIGNIFICANT CHANGE UP
-  CLINDAMYCIN: SIGNIFICANT CHANGE UP
-  DAPTOMYCIN: SIGNIFICANT CHANGE UP
-  ERTAPENEM: SIGNIFICANT CHANGE UP
-  ERYTHROMYCIN: SIGNIFICANT CHANGE UP
-  GENTAMICIN: SIGNIFICANT CHANGE UP
-  GENTAMICIN: SIGNIFICANT CHANGE UP
-  IMIPENEM: SIGNIFICANT CHANGE UP
-  LEVOFLOXACIN: SIGNIFICANT CHANGE UP
-  LINEZOLID: SIGNIFICANT CHANGE UP
-  MEROPENEM: SIGNIFICANT CHANGE UP
-  OXACILLIN: SIGNIFICANT CHANGE UP
-  PENICILLIN: SIGNIFICANT CHANGE UP
-  PIPERACILLIN/TAZOBACTAM: SIGNIFICANT CHANGE UP
-  RIFAMPIN: SIGNIFICANT CHANGE UP
-  TETRACYCLINE: SIGNIFICANT CHANGE UP
-  TOBRAMYCIN: SIGNIFICANT CHANGE UP
-  TRIMETHOPRIM/SULFAMETHOXAZOLE: SIGNIFICANT CHANGE UP
-  TRIMETHOPRIM/SULFAMETHOXAZOLE: SIGNIFICANT CHANGE UP
-  VANCOMYCIN: SIGNIFICANT CHANGE UP
ALBUMIN SERPL ELPH-MCNC: 1.9 G/DL — LOW (ref 3.3–5)
ALP SERPL-CCNC: 109 U/L — SIGNIFICANT CHANGE UP (ref 40–120)
ALT FLD-CCNC: 497 U/L — HIGH (ref 12–78)
ANION GAP SERPL CALC-SCNC: 4 MMOL/L — LOW (ref 5–17)
APTT BLD: 34.3 SEC — SIGNIFICANT CHANGE UP (ref 24.5–35.6)
AST SERPL-CCNC: 191 U/L — HIGH (ref 15–37)
BASE EXCESS BLDA CALC-SCNC: 4.5 MMOL/L — HIGH (ref -2–3)
BASOPHILS # BLD AUTO: 0.03 K/UL — SIGNIFICANT CHANGE UP (ref 0–0.2)
BASOPHILS NFR BLD AUTO: 0.3 % — SIGNIFICANT CHANGE UP (ref 0–2)
BILIRUB SERPL-MCNC: 0.6 MG/DL — SIGNIFICANT CHANGE UP (ref 0.2–1.2)
BLOOD GAS COMMENTS ARTERIAL: SIGNIFICANT CHANGE UP
BUN SERPL-MCNC: 35 MG/DL — HIGH (ref 7–23)
CALCIUM SERPL-MCNC: 8.1 MG/DL — LOW (ref 8.5–10.1)
CHLORIDE SERPL-SCNC: 110 MMOL/L — HIGH (ref 96–108)
CO2 SERPL-SCNC: 30 MMOL/L — SIGNIFICANT CHANGE UP (ref 22–31)
CREAT SERPL-MCNC: 1.9 MG/DL — HIGH (ref 0.5–1.3)
EGFR: 41 ML/MIN/1.73M2 — LOW
EOSINOPHIL # BLD AUTO: 1.33 K/UL — HIGH (ref 0–0.5)
EOSINOPHIL NFR BLD AUTO: 14 % — HIGH (ref 0–6)
GAS PNL BLDA: SIGNIFICANT CHANGE UP
GLUCOSE BLDC GLUCOMTR-MCNC: 196 MG/DL — HIGH (ref 70–99)
GLUCOSE BLDC GLUCOMTR-MCNC: 230 MG/DL — HIGH (ref 70–99)
GLUCOSE BLDC GLUCOMTR-MCNC: 233 MG/DL — HIGH (ref 70–99)
GLUCOSE BLDC GLUCOMTR-MCNC: 238 MG/DL — HIGH (ref 70–99)
GLUCOSE SERPL-MCNC: 268 MG/DL — HIGH (ref 70–99)
HCO3 BLDA-SCNC: 28 MMOL/L — SIGNIFICANT CHANGE UP (ref 21–28)
HCT VFR BLD CALC: 33.3 % — LOW (ref 39–50)
HGB BLD-MCNC: 10.9 G/DL — LOW (ref 13–17)
HOROWITZ INDEX BLDA+IHG-RTO: 30 — SIGNIFICANT CHANGE UP
IMM GRANULOCYTES NFR BLD AUTO: 0.6 % — SIGNIFICANT CHANGE UP (ref 0–0.9)
INR BLD: 1.15 RATIO — SIGNIFICANT CHANGE UP (ref 0.85–1.18)
LYMPHOCYTES # BLD AUTO: 0.7 K/UL — LOW (ref 1–3.3)
LYMPHOCYTES # BLD AUTO: 7.4 % — LOW (ref 13–44)
MAGNESIUM SERPL-MCNC: 2.6 MG/DL — SIGNIFICANT CHANGE UP (ref 1.6–2.6)
MCHC RBC-ENTMCNC: 31.3 PG — SIGNIFICANT CHANGE UP (ref 27–34)
MCHC RBC-ENTMCNC: 32.7 GM/DL — SIGNIFICANT CHANGE UP (ref 32–36)
MCV RBC AUTO: 95.7 FL — SIGNIFICANT CHANGE UP (ref 80–100)
METHOD TYPE: SIGNIFICANT CHANGE UP
METHOD TYPE: SIGNIFICANT CHANGE UP
MONOCYTES # BLD AUTO: 0.76 K/UL — SIGNIFICANT CHANGE UP (ref 0–0.9)
MONOCYTES NFR BLD AUTO: 8 % — SIGNIFICANT CHANGE UP (ref 2–14)
NEUTROPHILS # BLD AUTO: 6.61 K/UL — SIGNIFICANT CHANGE UP (ref 1.8–7.4)
NEUTROPHILS NFR BLD AUTO: 69.7 % — SIGNIFICANT CHANGE UP (ref 43–77)
NRBC # BLD: 0 /100 WBCS — SIGNIFICANT CHANGE UP (ref 0–0)
PCO2 BLDA: 41 MMHG — SIGNIFICANT CHANGE UP (ref 35–48)
PH BLDA: 7.45 — SIGNIFICANT CHANGE UP (ref 7.35–7.45)
PHOSPHATE SERPL-MCNC: 2.5 MG/DL — SIGNIFICANT CHANGE UP (ref 2.5–4.5)
PLATELET # BLD AUTO: 153 K/UL — SIGNIFICANT CHANGE UP (ref 150–400)
PO2 BLDA: 138 MMHG — HIGH (ref 83–108)
POTASSIUM SERPL-MCNC: 3.2 MMOL/L — LOW (ref 3.5–5.3)
POTASSIUM SERPL-SCNC: 3.2 MMOL/L — LOW (ref 3.5–5.3)
PROT SERPL-MCNC: 5.4 G/DL — LOW (ref 6–8.3)
PROTHROM AB SERPL-ACNC: 13.4 SEC — HIGH (ref 9.5–13)
RBC # BLD: 3.48 M/UL — LOW (ref 4.2–5.8)
RBC # FLD: 14.6 % — HIGH (ref 10.3–14.5)
SAO2 % BLDA: 99.7 % — HIGH (ref 94–98)
SODIUM SERPL-SCNC: 144 MMOL/L — SIGNIFICANT CHANGE UP (ref 135–145)
VANCOMYCIN TROUGH SERPL-MCNC: 15.3 UG/ML — SIGNIFICANT CHANGE UP (ref 10–20)
WBC # BLD: 9.49 K/UL — SIGNIFICANT CHANGE UP (ref 3.8–10.5)
WBC # FLD AUTO: 9.49 K/UL — SIGNIFICANT CHANGE UP (ref 3.8–10.5)

## 2024-06-16 PROCEDURE — 99291 CRITICAL CARE FIRST HOUR: CPT

## 2024-06-16 PROCEDURE — 99291 CRITICAL CARE FIRST HOUR: CPT | Mod: GC

## 2024-06-16 RX ORDER — METOPROLOL TARTRATE 50 MG
5 TABLET ORAL ONCE
Refills: 0 | Status: COMPLETED | OUTPATIENT
Start: 2024-06-16 | End: 2024-06-16

## 2024-06-16 RX ORDER — VANCOMYCIN HYDROCHLORIDE 50 MG/ML
750 KIT ORAL ONCE
Refills: 0 | Status: COMPLETED | OUTPATIENT
Start: 2024-06-16 | End: 2024-06-16

## 2024-06-16 RX ORDER — METOPROLOL TARTRATE 50 MG
25 TABLET ORAL
Refills: 0 | Status: DISCONTINUED | OUTPATIENT
Start: 2024-06-16 | End: 2024-06-17

## 2024-06-16 RX ORDER — INSULIN LISPRO 100 [IU]/ML
INJECTION, SOLUTION SUBCUTANEOUS EVERY 6 HOURS
Refills: 0 | Status: DISCONTINUED | OUTPATIENT
Start: 2024-06-16 | End: 2024-06-18

## 2024-06-16 RX ORDER — DEXMEDETOMIDINE HYDROCHLORIDE 4 UG/ML
0.5 INJECTION, SOLUTION INTRAVENOUS
Qty: 400 | Refills: 0 | Status: DISCONTINUED | OUTPATIENT
Start: 2024-06-16 | End: 2024-06-17

## 2024-06-16 RX ORDER — GLUCAGON HYDROCHLORIDE 1 MG/ML
1 INJECTION, POWDER, FOR SOLUTION INTRAMUSCULAR; INTRAVENOUS; SUBCUTANEOUS ONCE
Refills: 0 | Status: DISCONTINUED | OUTPATIENT
Start: 2024-06-16 | End: 2024-07-05

## 2024-06-16 RX ORDER — ENOXAPARIN SODIUM 100 MG/ML
100 INJECTION SUBCUTANEOUS EVERY 12 HOURS
Refills: 0 | Status: DISCONTINUED | OUTPATIENT
Start: 2024-06-16 | End: 2024-06-25

## 2024-06-16 RX ORDER — BENZOCAINE 15 %
1 LIQUID (ML) TOPICAL EVERY 6 HOURS
Refills: 0 | Status: DISCONTINUED | OUTPATIENT
Start: 2024-06-16 | End: 2024-07-05

## 2024-06-16 RX ORDER — POTASSIUM CHLORIDE 600 MG/1
40 TABLET, FILM COATED, EXTENDED RELEASE ORAL EVERY 4 HOURS
Refills: 0 | Status: COMPLETED | OUTPATIENT
Start: 2024-06-16 | End: 2024-06-16

## 2024-06-16 RX ADMIN — MUPIROCIN 1 APPLICATION(S): 20 OINTMENT TOPICAL at 17:36

## 2024-06-16 RX ADMIN — DEXMEDETOMIDINE HYDROCHLORIDE 12.7 MICROGRAM(S)/KG/HR: 4 INJECTION, SOLUTION INTRAVENOUS at 08:48

## 2024-06-16 RX ADMIN — Medication 15 MILLILITER(S): at 05:19

## 2024-06-16 RX ADMIN — HEPARIN SODIUM 5000 UNIT(S): 50 INJECTION, SOLUTION INTRAVENOUS at 05:19

## 2024-06-16 RX ADMIN — Medication 5 MILLIGRAM(S): at 18:33

## 2024-06-16 RX ADMIN — VANCOMYCIN HYDROCHLORIDE 250 MILLIGRAM(S): KIT at 15:08

## 2024-06-16 RX ADMIN — DEXMEDETOMIDINE HYDROCHLORIDE 15.2 MICROGRAM(S)/KG/HR: 4 INJECTION, SOLUTION INTRAVENOUS at 05:41

## 2024-06-16 RX ADMIN — Medication 15 MILLILITER(S): at 17:33

## 2024-06-16 RX ADMIN — DEXMEDETOMIDINE HYDROCHLORIDE 12.7 MICROGRAM(S)/KG/HR: 4 INJECTION, SOLUTION INTRAVENOUS at 13:32

## 2024-06-16 RX ADMIN — Medication 25 MILLIGRAM(S): at 17:33

## 2024-06-16 RX ADMIN — POTASSIUM CHLORIDE 40 MILLIEQUIVALENT(S): 600 TABLET, FILM COATED, EXTENDED RELEASE ORAL at 11:50

## 2024-06-16 RX ADMIN — DEXMEDETOMIDINE HYDROCHLORIDE 15.2 MICROGRAM(S)/KG/HR: 4 INJECTION, SOLUTION INTRAVENOUS at 02:25

## 2024-06-16 RX ADMIN — PROPOFOL 18.2 MICROGRAM(S)/KG/MIN: 10 INJECTION, EMULSION INTRAVENOUS at 01:05

## 2024-06-16 RX ADMIN — INSULIN LISPRO 4: 100 INJECTION, SOLUTION SUBCUTANEOUS at 17:34

## 2024-06-16 RX ADMIN — Medication 650 MILLIGRAM(S): at 17:33

## 2024-06-16 RX ADMIN — PANTOPRAZOLE SODIUM 40 MILLIGRAM(S): 40 INJECTION, POWDER, FOR SOLUTION INTRAVENOUS at 11:50

## 2024-06-16 RX ADMIN — POTASSIUM CHLORIDE 40 MILLIEQUIVALENT(S): 600 TABLET, FILM COATED, EXTENDED RELEASE ORAL at 08:48

## 2024-06-16 RX ADMIN — MUPIROCIN 1 APPLICATION(S): 20 OINTMENT TOPICAL at 05:19

## 2024-06-16 RX ADMIN — PROPOFOL 18.2 MICROGRAM(S)/KG/MIN: 10 INJECTION, EMULSION INTRAVENOUS at 09:16

## 2024-06-16 RX ADMIN — INSULIN LISPRO 4: 100 INJECTION, SOLUTION SUBCUTANEOUS at 11:59

## 2024-06-16 RX ADMIN — Medication 12.5 MILLIGRAM(S): at 05:19

## 2024-06-16 RX ADMIN — ENOXAPARIN SODIUM 100 MILLIGRAM(S): 100 INJECTION SUBCUTANEOUS at 17:33

## 2024-06-16 RX ADMIN — Medication 1 APPLICATION(S): at 12:07

## 2024-06-16 RX ADMIN — Medication 650 MILLIGRAM(S): at 17:50

## 2024-06-16 RX ADMIN — DEXTROSE MONOHYDRATE AND SODIUM CHLORIDE 60 MILLILITER(S): 5; .3 INJECTION, SOLUTION INTRAVENOUS at 13:29

## 2024-06-16 NOTE — PROGRESS NOTE ADULT - SUBJECTIVE AND OBJECTIVE BOX
Date of Service: 06-16-24 @ 13:13    Patient is a 53y old  Male who presents with a chief complaint of Acute respiratory failure with hypoxia (16 Jun 2024 11:26)      INTERVAL HPI/OVERNIGHT EVENTS: Patient seen and examined. NAD.    Vital Signs Last 24 Hrs  T(C): 37 (16 Jun 2024 11:44), Max: 37.7 (15 Paul 2024 20:06)  T(F): 98.6 (16 Jun 2024 11:44), Max: 99.9 (15 Paul 2024 20:06)  HR: 102 (16 Jun 2024 12:20) (80 - 120)  BP: 131/74 (16 Jun 2024 12:00) (82/55 - 135/73)  BP(mean): 95 (16 Jun 2024 12:00) (63 - 101)  RR: 22 (16 Jun 2024 12:00) (11 - 30)  SpO2: 100% (16 Jun 2024 12:20) (99% - 100%)    Parameters below as of 16 Jun 2024 06:00  Patient On (Oxygen Delivery Method): ventilator        06-16    144  |  110<H>  |  35<H>  ----------------------------<  268<H>  3.2<L>   |  30  |  1.90<H>    Ca    8.1<L>      16 Jun 2024 05:38  Phos  2.5     06-16  Mg     2.6     06-16    TPro  5.4<L>  /  Alb  1.9<L>  /  TBili  0.6  /  DBili  x   /  AST  191<H>  /  ALT  497<H>  /  AlkPhos  109  06-16                          10.9   9.49  )-----------( 153      ( 16 Jun 2024 05:38 )             33.3     PT/INR - ( 16 Jun 2024 05:38 )   PT: 13.4 sec;   INR: 1.15 ratio         PTT - ( 16 Jun 2024 05:38 )  PTT:34.3 sec  CAPILLARY BLOOD GLUCOSE      POCT Blood Glucose.: 233 mg/dL (16 Jun 2024 11:55)  POCT Blood Glucose.: 230 mg/dL (16 Jun 2024 05:13)  POCT Blood Glucose.: 232 mg/dL (15 Paul 2024 23:27)  POCT Blood Glucose.: 297 mg/dL (15 Paul 2024 17:33)    Urinalysis Basic - ( 16 Jun 2024 05:38 )    Color: x / Appearance: x / SG: x / pH: x  Gluc: 268 mg/dL / Ketone: x  / Bili: x / Urobili: x   Blood: x / Protein: x / Nitrite: x   Leuk Esterase: x / RBC: x / WBC x   Sq Epi: x / Non Sq Epi: x / Bacteria: x              acetaminophen   Oral Liquid .. 650 milliGRAM(s) Oral every 6 hours PRN  chlorhexidine 0.12% Liquid 15 milliLiter(s) Oral Mucosa every 12 hours  chlorhexidine 2% Cloths 1 Application(s) Topical daily  dexMEDEtomidine Infusion 0.5 MICROgram(s)/kG/Hr IV Continuous <Continuous>  dextrose 10% Bolus 125 milliLiter(s) IV Bolus once  dextrose 5%. 1000 milliLiter(s) IV Continuous <Continuous>  dextrose 5%. 1000 milliLiter(s) IV Continuous <Continuous>  dextrose 50% Injectable 25 Gram(s) IV Push once  dextrose 50% Injectable 12.5 Gram(s) IV Push once  dextrose Oral Gel 15 Gram(s) Oral once PRN  enoxaparin Injectable 100 milliGRAM(s) SubCutaneous every 12 hours  glucagon  Injectable 1 milliGRAM(s) IntraMuscular once  insulin lispro (ADMELOG) corrective regimen sliding scale   SubCutaneous every 6 hours  lactated ringers. 1000 milliLiter(s) IV Continuous <Continuous>  metoprolol tartrate 12.5 milliGRAM(s) Oral every 12 hours  mupirocin 2% Nasal 1 Application(s) Both Nostrils two times a day  pantoprazole  Injectable 40 milliGRAM(s) IV Push daily  propofol Infusion 30 MICROgram(s)/kG/Min IV Continuous <Continuous>  sodium chloride 0.9% lock flush 10 milliLiter(s) IV Push every 1 hour PRN  vancomycin  IVPB 750 milliGRAM(s) IV Intermittent once              REVIEW OF SYSTEMS: unobtainable    Consultant(s) Notes Reviewed:  [X] YES  [ ] NO    PHYSICAL EXAM:  GENERAL: NAD  HEAD:  Atraumatic, Normocephalic  ENMT: intubated  NECK: Supple, No JVD  CHEST/LUNG: decreased BS b/l  HEART: S1S2  ABDOMEN: Soft    Advanced care planning discussed with patient/family [X] YES   [ ] NO    Advanced care planning discussed with patient/family. Patient's health status was discussed. All appropriate changes have been made regarding patient's end-of-life care. Advanced care planning forms reviewed/discussed/completed.  20 minutes spent.

## 2024-06-16 NOTE — CHART NOTE - NSCHARTNOTEFT_GEN_A_CORE
Time of evaluation: 00:01    Called to evaluate patient for restraints    Other interventions attempted: de-escalation, orientation check, environment modification, medication assessment    REVIEW OF SYSTEMS:  CONSTITUTIONAL: [  ] fever   [  ] fatigue  EYES: [  ] visual disturbances  ENMT:  [  ]difficulty hearing  [  ] throat pain  RESPIRATORY:  [  ]cough  [   ] wheezing  [   ] hemoptysis [  ] shortness of breath  CARDIOVASCULAR: [  ]chest pain  [  ] palpitations   GASTROINTESTINAL: [  ]  abdominal pain [  ] nausea [  ] vomiting  [  ] diarrhea  [  ] constipation  GENITOURINARY: [  ] dysuria [  ] frequency  [  ] hematuria [  ] incontinence  NEUROLOGICAL: [  ] headaches [  ] new numbness  SKIN: [  ]itching [  ] new rash   MUSCULOSKELETAL: [  ] back pain  [  ] extremity pain  PSYCHIATRIC: [  ] depression  [  ] anxiety  [  ] mood swings [  ] difficulty sleeping  ALLERGY AND IMMUNOLOGIC: [  ] hives    [ X ]Unable to perform ROS due to: intubated/sedated  [  ] ROS reviewed and all negative    PAST MEDICAL & SURGICAL HISTORY:  Diabetes  Diabetes mellitus with no complication  Afib  Hypertension  BPH (benign prostatic hyperplasia)  Perforated gastric ulcer  s/p emergent ex-lap omentopexy and plication 6/2019  Pulmonary embolism  History of non-ST elevation myocardial infarction (NSTEMI)  Osteomyelitis  s/p debridement  CAD S/P percutaneous coronary angioplasty  Cerebrovascular accident  H/O abdominal surgery  Perforated gastric ulcer  Traumatic amputation of left foot, initial encounter    MEDICATIONS  (STANDING):  aMIOdarone Infusion 1 mG/Min (33.3 mL/Hr) IV Continuous <Continuous>  aMIOdarone Infusion 0.5 mG/Min (16.7 mL/Hr) IV Continuous <Continuous>  chlorhexidine 0.12% Liquid 15 milliLiter(s) Oral Mucosa every 12 hours  chlorhexidine 2% Cloths 1 Application(s) Topical daily  dexMEDEtomidine Infusion 0.6 MICROgram(s)/kG/Hr (15.2 mL/Hr) IV Continuous <Continuous>  dextrose 10% Bolus 125 milliLiter(s) IV Bolus once  dextrose 5%. 1000 milliLiter(s) (100 mL/Hr) IV Continuous <Continuous>  dextrose 5%. 1000 milliLiter(s) (50 mL/Hr) IV Continuous <Continuous>  dextrose 50% Injectable 25 Gram(s) IV Push once  dextrose 50% Injectable 12.5 Gram(s) IV Push once  heparin   Injectable 5000 Unit(s) SubCutaneous every 8 hours  lactated ringers. 1000 milliLiter(s) (60 mL/Hr) IV Continuous <Continuous>  metoprolol tartrate 12.5 milliGRAM(s) Oral every 12 hours  mupirocin 2% Nasal 1 Application(s) Both Nostrils two times a day  pantoprazole  Injectable 40 milliGRAM(s) IV Push daily  propofol Infusion 30 MICROgram(s)/kG/Min (18.2 mL/Hr) IV Continuous <Continuous>  MEDICATIONS  (PRN):  acetaminophen   Oral Liquid .. 650 milliGRAM(s) Oral every 6 hours PRN Temp greater or equal to 38C (100.4F)  dextrose Oral Gel 15 Gram(s) Oral once PRN Blood Glucose LESS THAN 70 milliGRAM(s)/deciliter  sodium chloride 0.9% lock flush 10 milliLiter(s) IV Push every 1 hour PRN Pre/post blood products, medications, blood draw, and to maintain line patency                      11.6   15.77 )-----------( 238      ( 15 Paul 2024 05:40 )             35.6     06-15  143  |  108  |  52<H>  ----------------------------<  232<H>  3.6   |  25  |  2.60<H>  Ca    8.2<L>      15 Paul 2024 05:40  Phos  2.7     06-15  Mg     1.5     06-15  TPro  5.4<L>  /  Alb  2.1<L>  /  TBili  0.5  /  DBili  x   /  AST  219<H>  /  ALT  380<H>  /  AlkPhos  107  06-15    Vital Signs Last 24 Hrs  T(C): 37.7 (15 Paul 2024 23:24), Max: 38 (15 Paul 2024 02:30)  T(F): 99.9 (15 Paul 2024 23:24), Max: 100.4 (15 Paul 2024 02:30)  HR: 94 (15 Paul 2024 22:30) (86 - 156)  BP: 105/63 (15 Paul 2024 22:30) (81/52 - 127/84)  BP(mean): 78 (15 Paul 2024 22:30) (60 - 100)  RR: 24 (15 Paul 2024 22:30) (13 - 24)  SpO2: 100% (15 Paul 2024 22:30) (96% - 100%)  Parameters below as of 15 Palu 2024 22:00  Patient On (Oxygen Delivery Method): ventilator    Physical Examination:  General: +Intubated.   HEENT: PERRL.   PULM: CTA B/L.   CVS: s1/s2.   ABD: Soft, nondistended, nontender, normoactive bowel sounds.  EXT: No edema, nontender  SKIN: Warm.    [  ] Unable to perform physical exam due to    [ X ] I have evaluated this patient and have determined that restraints are warranted to optimize medical care. Patient was assessed for current physical and psychological risk factors as well as special needs. There are no medical conditions or limitations that would place this patient at risk while in restraints.    Type of restraint: Bilateral soft wrist restraints    Behavioral criteria for discontinuation of restraint: [ X ] See order    Attending MD Aware    Time spent on this patient encounter, which includes documenting this note in the electronic medical record, was +35 minutes including assessing the presenting problems with associated risks, reviewing the medical record to prepare for the encounter, and meeting face to face with the patient to obtain additional history. I have also performed an appropriate physical exam, made interventions listed and ordered and interpreted appropriate diagnostic studies as documented. To improve communication and patient safety, I have coordinated care with the multidisciplinary team including the bedside nurse, appropriate attending of record and consultants as needed. This time is independent of any procedures performed.    Date of entry of this note is equal to the date of services rendered.

## 2024-06-16 NOTE — PROGRESS NOTE ADULT - ASSESSMENT
53 year old male with a PMH of AF on Eliquis, indwelling jacques, CAD s/p stents, CVA, DM, HTN, hx PE, recent hospitalization discharged yesterday after an admission for R heel osteomyelitis w/ debridement who presented to the ED from [facility] for difficulty breathing.    Problem list:  Asystolic cardiac arrest  Acute hypoxic respiratory failure  Shock (septic v cardiogenic)   Lactic/respiratory acidosis  OBI  Transaminitis  Hypomagnesemia  Afib  Elevated INR    Neuro: Acute metabolic encephalopathy 2/2 ?shock vs. anoxic injury   - D/C ketamine; start precedex, continue propofol for sedation for patient comfort  - Wean propofol as tolerated     CV: Cardiogenic shock 2/2 bradycardia vs distributive shock 2/2 anaphylactic reaction to ertapenem.   - Per medication review w/ [facility] eMAR, patient received total cardizem ER 720mg and Toprol XL 200mg in last 24 hrs  --> ?cardiogenic shock 2/2 bradycardia  - Calcium gluconate to stabilize cardiac membrane and BB toxicity  -?distributive shock 2/2 anaphylaxis to ertapenem  - EKG on arrival with slow afib but no signs of ischemia.   - No longer in shock; phenylephrine discontinued   - Improvement in HR, remains in afib; off amio gtt, IV lopressor --> continue lopressor 12.5mg PO BID   - Coag panel now within normal limits; initiate FD Lovenox for AC   - Trops peaked, downtrending   - Echo from March w/ normal LVEF.    - TTE 6/14: Left ventricular systolic function is normal with an ejection fraction visually estimated at 55 to 60 %. There are no regional wall motion abnormalities seen. Moderate to severe left ventricular hypertrophy. Normal right ventricular cavity size, with normal wall thickness, and normal systolic function. The left atrium is severely dilated. The right atrium is severely dilated.  - Cardiology following    Pulm: Acute hypoxic respiratory failure /resp acidosis  - C/w full vent support.   - Metabolic acidosis improved; repeat ABG this AM minimally changed from prior   - SBTs as able.     GI:   - NPO. given multiple dilated bowel loops on CT  - CT AP: Numerous mildly distended gas-filled and fluid-filled small bowel loops with air-fluid levels and no transition point involving the entire small bowel is most suggestive of ileus, ascites or pneumoperitoneum. Emphysema.  - NGT to suction, now minimal drainage   - Tolerating trickle feeds; initiate regular tube feeds   - Patient now with diarrhea; d/c naloxegrol, senna, miralax   - c/w PPI for GI prophylaxis.   - Transaminitis improving/stable     Renal: OBI likely 2/2 ATN from cardiac arrest vs prerenal   - Cr remains elevated, now downtrending   - Lactate downtrending, now wnl   - Profound lactic acidosis likely from cardiac arrest   - S/p 1L LR bolus   - Replete lytes PRN  K>4, Mg>2.     ID: ongoing treatment for OM s/p R heel debridement during prior hospitalization  - Was receiving ertapenem and vancomycin, however as per prior ID notes ertapenem was stopped due to development of rash.    - Wound/tissue cultures from prior admission with MRSA, ESBL E.coli, E faecalis.    - Re-dose Vanc; 15.3 this AM   - BCx NGTD 48hr  - UA large LE, neg nitrites, WBC TNTC, few bacteria; UCx pending   - Monitor patients clinical presentation, plan to re-check UA if any new febrile episodes   - Continue mupirocin   - ID following    Endocrine: Hx of DM  - Monitor FS q6h   - Now hyperglycemic; start MDISS   - TSH elevated. T3/T4 wnl; likely 2/2 recovering from sick euthyroid syndrome    Heme:  - On Eliquis as an outpatient.   - INR now wnl; start FD lovenox     DISPO: FULL CODE

## 2024-06-16 NOTE — PROGRESS NOTE ADULT - SUBJECTIVE AND OBJECTIVE BOX
Patient is a 53y old  Male who presents with a chief complaint of Acute respiratory failure with hypoxia (16 Jun 2024 11:26)    24 hour events: Overnight, patient was weaned off of phenylephrine. Amiodarone and lopressor were both discontinued. Patient was started on oral lopressor. HR maintaining 90s-100s, still in a.fib. Patient had a BP 82/55 at 1700, otherwise has maintained SBP 110s.      REVIEW OF SYSTEMS: unable to obtain 2/2 intubated/ sedated    T(F): 98.6 (06-16-24 @ 11:44), Max: 99.9 (06-15-24 @ 20:06)  HR: 94 (06-16-24 @ 13:00) (80 - 120)  BP: 104/66 (06-16-24 @ 13:00) (82/55 - 135/73)  RR: 22 (06-16-24 @ 13:00) (11 - 30)  SpO2: 100% (06-16-24 @ 13:00) (99% - 100%)  Wt(kg): --    Mode: AC/ CMV (Assist Control/ Continuous Mandatory Ventilation), RR (machine): 18, TV (machine): 420, FiO2: 30, PEEP: 5      I&O's Detail    15 Paul 2024 07:01  -  16 Jun 2024 07:00  --------------------------------------------------------  IN:    Amiodarone: 150.3 mL    Dexmedetomidine: 349 mL    IV PiggyBack: 200 mL    IV PiggyBack: 250 mL    Lactated Ringers: 650 mL    Lactated Ringers Bolus: 500 mL    Nepro with Carb Steady: 190 mL    Phenylephrine: 79.8 mL    Propofol: 127.4 mL    Propofol: 215 mL    Sodium Bicarbonate: 400 mL  Total IN: 3111.5 mL    OUT:    Amiodarone: 0 mL    Indwelling Catheter - Urethral (mL): 2210 mL    Ketamine: 0 mL    Nasogastric/Oral tube (mL): 0 mL    Norepinephrine: 0 mL  Total OUT: 2210 mL    Total NET: 901.5 mL      16 Jun 2024 07:01  -  16 Jun 2024 14:30  --------------------------------------------------------  IN:    Dexmedetomidine: 17.7 mL    Dexmedetomidine: 85 mL    Enteral Tube Flush: 100 mL    Glucerna 1.5: 60 mL    Lactated Ringers: 360 mL    Nepro with Carb Steady: 40 mL    Propofol: 51 mL  Total IN: 713.7 mL    OUT:    Indwelling Catheter - Urethral (mL): 800 mL  Total OUT: 800 mL    Total NET: -86.3 mL        PHYSICAL EXAM:  General: Ill appearing male +intubated/sedated  HEENT: Pupils equal, reactive to light.  Symmetric.  PULM: Clear to auscultation bilaterally  CVS: +tachycardia, irregularly irregular   ABD: mildly Distended abdomen   EXT: Wounds to the b/l LE (LLE transmetatarsal amputation)  SKIN: b/l RUE Erythematous rash appreciated to the trunk and b/l UE extremities   NEURO: PERRL, + spontaneous movements     MEDICATIONS  vancomycin  IVPB IV Intermittent    metoprolol tartrate Oral    dextrose 50% Injectable IV Push  dextrose 50% Injectable IV Push  dextrose Oral Gel Oral PRN  glucagon  Injectable IntraMuscular  insulin lispro (ADMELOG) corrective regimen sliding scale SubCutaneous      acetaminophen   Oral Liquid .. Oral PRN  dexMEDEtomidine Infusion IV Continuous  propofol Infusion IV Continuous      enoxaparin Injectable SubCutaneous    pantoprazole  Injectable IV Push      dextrose 10% Bolus IV Bolus  dextrose 5%. IV Continuous  dextrose 5%. IV Continuous  lactated ringers. IV Continuous  sodium chloride 0.9% lock flush IV Push PRN      chlorhexidine 0.12% Liquid Oral Mucosa  chlorhexidine 2% Cloths Topical  mupirocin 2% Nasal Both Nostrils                            10.9   9.49  )-----------( 153      ( 16 Jun 2024 05:38 )             33.3       06-16    144  |  110<H>  |  35<H>  ----------------------------<  268<H>  3.2<L>   |  30  |  1.90<H>    Ca    8.1<L>      16 Jun 2024 05:38  Phos  2.5     06-16  Mg     2.6     06-16    TPro  5.4<L>  /  Alb  1.9<L>  /  TBili  0.6  /  DBili  x   /  AST  191<H>  /  ALT  497<H>  /  AlkPhos  109  06-16          PT/INR - ( 16 Jun 2024 05:38 )   PT: 13.4 sec;   INR: 1.15 ratio         PTT - ( 16 Jun 2024 05:38 )  PTT:34.3 sec  Urinalysis Basic - ( 16 Jun 2024 05:38 )    Color: x / Appearance: x / SG: x / pH: x  Gluc: 268 mg/dL / Ketone: x  / Bili: x / Urobili: x   Blood: x / Protein: x / Nitrite: x   Leuk Esterase: x / RBC: x / WBC x   Sq Epi: x / Non Sq Epi: x / Bacteria: x      .Surgical Swab RIGHT HEEL WOUND   Numerous Citrobacter freundii complex  Moderate Methicillin Resistant Staphylococcus aureus  Few Pseudomonas stutzeri Susceptibility to follow.  Few Bacteroides caccae "Susceptibilities not performed"  Rare Bacteroides vulgatus group "Susceptibilities not performed" -- 06-14 @ 02:47  .Blood Blood-Peripheral   No growth at 48 Hours -- 06-13 @ 22:55  .Blood Blood-Peripheral   No growth at 48 Hours -- 06-13 @ 22:50      Rapid RVP Result: NotDetec (06-14 @ 17:00)      Tubes/Lines: LIJ cenral line , R PICC (placed prior to hospital arrival) , L PIV  +NGT, +jacques cath changed in ICU    GLOBAL ISSUE/BEST PRACTICE:  Analgesia: Y  Sedation: Y  HOB elevation: Y  Stress ulcer prophylaxis: Y  VTE prophylaxis: SCDs  Glycemic control: Y  Nutrition: NPO    CODE STATUS: FULL CODE  Patient is a 53y old  Male who presents with a chief complaint of Acute respiratory failure with hypoxia (16 Jun 2024 11:26)    24 hour events: Overnight, patient was weaned off of phenylephrine. Amiodarone load completed. Patient was started on oral lopressor. HR maintaining 90s-100s, still in a.fib. Patient had a BP 82/55 at 1700, otherwise has maintained SBP 110s.      REVIEW OF SYSTEMS: unable to obtain 2/2 intubated/ sedated    T(F): 98.6 (06-16-24 @ 11:44), Max: 99.9 (06-15-24 @ 20:06)  HR: 94 (06-16-24 @ 13:00) (80 - 120)  BP: 104/66 (06-16-24 @ 13:00) (82/55 - 135/73)  RR: 22 (06-16-24 @ 13:00) (11 - 30)  SpO2: 100% (06-16-24 @ 13:00) (99% - 100%)  Wt(kg): --    Mode: AC/ CMV (Assist Control/ Continuous Mandatory Ventilation), RR (machine): 18, TV (machine): 420, FiO2: 30, PEEP: 5      I&O's Detail    15 Paul 2024 07:01  -  16 Jun 2024 07:00  --------------------------------------------------------  IN:    Amiodarone: 150.3 mL    Dexmedetomidine: 349 mL    IV PiggyBack: 200 mL    IV PiggyBack: 250 mL    Lactated Ringers: 650 mL    Lactated Ringers Bolus: 500 mL    Nepro with Carb Steady: 190 mL    Phenylephrine: 79.8 mL    Propofol: 127.4 mL    Propofol: 215 mL    Sodium Bicarbonate: 400 mL  Total IN: 3111.5 mL    OUT:    Amiodarone: 0 mL    Indwelling Catheter - Urethral (mL): 2210 mL    Ketamine: 0 mL    Nasogastric/Oral tube (mL): 0 mL    Norepinephrine: 0 mL  Total OUT: 2210 mL    Total NET: 901.5 mL      16 Jun 2024 07:01  -  16 Jun 2024 14:30  --------------------------------------------------------  IN:    Dexmedetomidine: 17.7 mL    Dexmedetomidine: 85 mL    Enteral Tube Flush: 100 mL    Glucerna 1.5: 60 mL    Lactated Ringers: 360 mL    Nepro with Carb Steady: 40 mL    Propofol: 51 mL  Total IN: 713.7 mL    OUT:    Indwelling Catheter - Urethral (mL): 800 mL  Total OUT: 800 mL    Total NET: -86.3 mL        PHYSICAL EXAM:  General: Ill appearing male +intubated/sedated  HEENT: Pupils equal, reactive to light.  Symmetric.  PULM: Clear to auscultation bilaterally  CVS: +tachycardia, irregularly irregular   ABD: mildly Distended abdomen   EXT: Wounds to the b/l LE (LLE transmetatarsal amputation)  SKIN: b/l RUE Erythematous rash appreciated to the trunk and b/l UE extremities   NEURO: PERRL, + spontaneous movements     MEDICATIONS  vancomycin  IVPB IV Intermittent    metoprolol tartrate Oral    dextrose 50% Injectable IV Push  dextrose 50% Injectable IV Push  dextrose Oral Gel Oral PRN  glucagon  Injectable IntraMuscular  insulin lispro (ADMELOG) corrective regimen sliding scale SubCutaneous      acetaminophen   Oral Liquid .. Oral PRN  dexMEDEtomidine Infusion IV Continuous  propofol Infusion IV Continuous      enoxaparin Injectable SubCutaneous    pantoprazole  Injectable IV Push      dextrose 10% Bolus IV Bolus  dextrose 5%. IV Continuous  dextrose 5%. IV Continuous  lactated ringers. IV Continuous  sodium chloride 0.9% lock flush IV Push PRN      chlorhexidine 0.12% Liquid Oral Mucosa  chlorhexidine 2% Cloths Topical  mupirocin 2% Nasal Both Nostrils                            10.9   9.49  )-----------( 153      ( 16 Jun 2024 05:38 )             33.3       06-16    144  |  110<H>  |  35<H>  ----------------------------<  268<H>  3.2<L>   |  30  |  1.90<H>    Ca    8.1<L>      16 Jun 2024 05:38  Phos  2.5     06-16  Mg     2.6     06-16    TPro  5.4<L>  /  Alb  1.9<L>  /  TBili  0.6  /  DBili  x   /  AST  191<H>  /  ALT  497<H>  /  AlkPhos  109  06-16          PT/INR - ( 16 Jun 2024 05:38 )   PT: 13.4 sec;   INR: 1.15 ratio         PTT - ( 16 Jun 2024 05:38 )  PTT:34.3 sec  Urinalysis Basic - ( 16 Jun 2024 05:38 )    Color: x / Appearance: x / SG: x / pH: x  Gluc: 268 mg/dL / Ketone: x  / Bili: x / Urobili: x   Blood: x / Protein: x / Nitrite: x   Leuk Esterase: x / RBC: x / WBC x   Sq Epi: x / Non Sq Epi: x / Bacteria: x      .Surgical Swab RIGHT HEEL WOUND   Numerous Citrobacter freundii complex  Moderate Methicillin Resistant Staphylococcus aureus  Few Pseudomonas stutzeri Susceptibility to follow.  Few Bacteroides caccae "Susceptibilities not performed"  Rare Bacteroides vulgatus group "Susceptibilities not performed" -- 06-14 @ 02:47  .Blood Blood-Peripheral   No growth at 48 Hours -- 06-13 @ 22:55  .Blood Blood-Peripheral   No growth at 48 Hours -- 06-13 @ 22:50      Rapid RVP Result: NotDetec (06-14 @ 17:00)      Tubes/Lines: LIJ cenral line , R PICC (placed prior to hospital arrival) , L PIV  +NGT, +jacques cath changed in ICU    GLOBAL ISSUE/BEST PRACTICE:  Analgesia: Y  Sedation: Y  HOB elevation: Y  Stress ulcer prophylaxis: Y  VTE prophylaxis: SCDs  Glycemic control: Y  Nutrition: NPO    CODE STATUS: FULL CODE

## 2024-06-16 NOTE — PROGRESS NOTE ADULT - SUBJECTIVE AND OBJECTIVE BOX
[facility]    Brittany Lutz, Reynaldo Sweeney, Román, Francesco      [phone]    CHIEF COMPLAINT: Patient is a 53y old  Male who presents with a chief complaint of Acute respiratory failure with hypoxia (15 Paul 2024 14:38)      Follow Up: bradycardia, af, resp failure    Interim history: Unable to provide a history on the basis of a poor mental status.  Events noted. heart rates slower on ivbb    MEDICATIONS  (STANDING):  chlorhexidine 0.12% Liquid 15 milliLiter(s) Oral Mucosa every 12 hours  chlorhexidine 2% Cloths 1 Application(s) Topical daily  dexMEDEtomidine Infusion 0.5 MICROgram(s)/kG/Hr (12.7 mL/Hr) IV Continuous <Continuous>  dextrose 10% Bolus 125 milliLiter(s) IV Bolus once  dextrose 5%. 1000 milliLiter(s) (100 mL/Hr) IV Continuous <Continuous>  dextrose 5%. 1000 milliLiter(s) (50 mL/Hr) IV Continuous <Continuous>  dextrose 50% Injectable 25 Gram(s) IV Push once  dextrose 50% Injectable 12.5 Gram(s) IV Push once  glucagon  Injectable 1 milliGRAM(s) IntraMuscular once  heparin   Injectable 5000 Unit(s) SubCutaneous every 8 hours  insulin lispro (ADMELOG) corrective regimen sliding scale   SubCutaneous every 6 hours  lactated ringers. 1000 milliLiter(s) (60 mL/Hr) IV Continuous <Continuous>  metoprolol tartrate 12.5 milliGRAM(s) Oral every 12 hours  mupirocin 2% Nasal 1 Application(s) Both Nostrils two times a day  pantoprazole  Injectable 40 milliGRAM(s) IV Push daily  potassium chloride   Powder 40 milliEquivalent(s) Oral every 4 hours  propofol Infusion 30 MICROgram(s)/kG/Min (18.2 mL/Hr) IV Continuous <Continuous>  vancomycin  IVPB 750 milliGRAM(s) IV Intermittent once    MEDICATIONS  (PRN):  acetaminophen   Oral Liquid .. 650 milliGRAM(s) Oral every 6 hours PRN Temp greater or equal to 38C (100.4F)  dextrose Oral Gel 15 Gram(s) Oral once PRN Blood Glucose LESS THAN 70 milliGRAM(s)/deciliter  sodium chloride 0.9% lock flush 10 milliLiter(s) IV Push every 1 hour PRN Pre/post blood products, medications, blood draw, and to maintain line patency      REVIEW OF SYSTEMS: unable to provide  Vital Signs Last 24 Hrs  T(C): 36.7 (16 Jun 2024 07:48), Max: 37.7 (15 Paul 2024 20:06)  T(F): 98.1 (16 Jun 2024 07:48), Max: 99.9 (15 Paul 2024 20:06)  HR: 100 (16 Jun 2024 10:00) (80 - 136)  BP: 121/89 (16 Jun 2024 10:00) (81/52 - 135/73)  BP(mean): 101 (16 Jun 2024 10:00) (60 - 101)  RR: 20 (16 Jun 2024 10:00) (11 - 30)  SpO2: 100% (16 Jun 2024 10:00) (99% - 100%)    Parameters below as of 16 Jun 2024 06:00  Patient On (Oxygen Delivery Method): ventilator        I&O's Summary    15 Paul 2024 07:01  -  16 Jun 2024 07:00  --------------------------------------------------------  IN: 3111.5 mL / OUT: 2210 mL / NET: 901.5 mL    16 Jun 2024 07:01  -  16 Jun 2024 10:47  --------------------------------------------------------  IN: 248.7 mL / OUT: 300 mL / NET: -51.3 mL        Telemetry past 24h: af rates 150-> 100s, 12 beats nsvt    PHYSICAL EXAM:    Constitutional: well-nourished, well-developed, NAD   HEENT:  ngt ett, sclerae anicteric, conjunctivae clear, no oral cyanosis.  Pulmonary: Non-labored, breath sounds are clear bilaterally, No wheezing, rales or rhonchi  Cardiovascular: irregular, S1 and S2.  No murmur.  No rubs, gallops or clicks  Gastrointestinal: Bowel Sounds present, soft, nontender.   Lymph: mild peripheral edema. distal amp  Neurological: unable to evaluate, poor mental status  Skin: No rashes.  Psych:  Mood & affect not evaluable    LABS: All Labs Reviewed:                        10.9   9.49  )-----------( 153      ( 16 Jun 2024 05:38 )             33.3                         11.6   15.77 )-----------( 238      ( 15 Paul 2024 05:40 )             35.6                         13.7   34.23 )-----------( 446      ( 14 Jun 2024 04:30 )             45.0     16 Jun 2024 05:38    144    |  110    |  35     ----------------------------<  268    3.2     |  30     |  1.90   15 Paul 2024 05:40    143    |  108    |  52     ----------------------------<  232    3.6     |  25     |  2.60   14 Jun 2024 13:37    139    |  106    |  50     ----------------------------<  371    3.8     |  19     |  3.00     Ca    8.1        16 Jun 2024 05:38  Ca    8.2        15 Paul 2024 05:40  Ca    8.0        14 Jun 2024 13:37  Phos  2.5       16 Jun 2024 05:38  Phos  2.7       15 Paul 2024 05:40  Phos  0.8       14 Jun 2024 10:05  Mg     2.6       16 Jun 2024 05:38  Mg     1.5       15 Paul 2024 05:40  Mg     1.9       14 Jun 2024 10:05    TPro  5.4    /  Alb  1.9    /  TBili  0.6    /  DBili  x      /  AST  191    /  ALT  497    /  AlkPhos  109    16 Jun 2024 05:38  TPro  5.4    /  Alb  2.1    /  TBili  0.5    /  DBili  x      /  AST  219    /  ALT  380    /  AlkPhos  107    15 Paul 2024 05:40  TPro  6.8    /  Alb  2.7    /  TBili  0.7    /  DBili  x      /  AST  385    /  ALT  435    /  AlkPhos  152    14 Jun 2024 04:30    PT/INR - ( 16 Jun 2024 05:38 )   PT: 13.4 sec;   INR: 1.15 ratio         PTT - ( 16 Jun 2024 05:38 )  PTT:34.3 sec      Blood Culture: Organism Citrobacter freundii complex  Gram Stain Blood -- Gram Stain --  Specimen Source .Surgical Swab RIGHT HEEL WOUND  Culture-Blood --    Organism --  Gram Stain Blood -- Gram Stain --  Specimen Source .Blood Blood-Peripheral  Culture-Blood --    Organism --  Gram Stain Blood -- Gram Stain --  Specimen Source .Blood Blood-Peripheral  Culture-Blood --        06-13 @ 23:55  TSH: 7.84      RADIOLOGY:    EKG:    Echo:

## 2024-06-16 NOTE — PHARMACOTHERAPY INTERVENTION NOTE - COMMENTS
Vancomycin Dosing Per Pharmacy:    1. Indication for the vancomycin: R heel wound/osteomyelitis  2. Requesting Provider: Dr. Whitten (discussed with Dr. Muniz)  3. Current plan and dosing regimen: 750 mg x 1 dose   4. Day of therapy: 13  5. Cultures: tissue culture 5/26/24: E faecalis (susceptible to vancomycin) and MRSA. Tissue culture 06/04/24: ESBL E coli and E faecalis susceptible to vancomycin  6. Target trough or AUC/BAYLEE: 400-600  7. Day and time level is due: 6/17 at 05:00  8. Previous levels: 6/4 (19:31): 22, 6/5 (05:47): 14, 6/6 (20:20): 14, 6/9 (04:58): 14.4, 6/11 (04:30): 15.7, 6/13 (05:54): 17.3, 6/14 (01:25): 15.2, 6/14 (04:30): 16.1, 6/15 (5:40): 17.5, 6/16 (5:38): 15.3  9. Expected 24-hour AUC: 544

## 2024-06-16 NOTE — PROVIDER CONTACT NOTE (CRITICAL VALUE NOTIFICATION) - SITUATION
numerous  citrobacter sreundii complex morderate mrsa few psuedomonas stutzer, few bacterodis caccae, rare bacterodi vulvulgatus

## 2024-06-16 NOTE — PROGRESS NOTE ADULT - ASSESSMENT
52 YO M with past medical history of  AFib (on Eliquis), indwelling Aguilera, cerebral aneurysm, CAD (s/p stents), CVA, T2DM, HTN, OM (s/p debridement), PE, perforated gastric ulcer s/p ometnopexy 2019 with Dr. Mejia, transferred from [facility] for difficulty in breathing.  His recent admission was for osteomyelitis and discharged on IV Vancomycin, admitted s/p cardiac arrest x2 w/ AHRF admitted to the ICU currently intubated, sedated, on levophed and epinephrine, NGT placed in the ED. Cardiology consulted for cardiac arrest and afib.     - No clear evidence of acute ischemia  -asa and statin when able    -arrest and johnny issues likely iatrogenic from double dosing of avn blockers  -hr increased, prob aggravated by pressors, and meds wearing off  -given was still pressor dependent, rate control was attempted with dig and amio  -6/15 pressors minimized and on primarily piero, and started on metop iv for rate, now po low dose  -may require sig higher dose of metoprolol enterally, and would increase as needed/tolerated  -hr now 100s, approp given overall clinical condition   -will eventually resume ccb, can hold off for now  -ac when able  -normal ef and mod-sev mr on echo, no need to repeat now     -atn with improving creat  -keep i=o    Upon my evaluation, this patient is at high risk for imminent or life threatening deterioration due to resp failure, hypotension,  and other active medical issues which require my direct attention, intervention, and personal management.  I have personally spent >35 minutes  of critical care time exclusive of time spent on separate billing procedures. This includes review of laboratory data, radiology results, discussion with primary team, and monitoring for potential decompensation Interventions were performed as documented above.

## 2024-06-16 NOTE — PROGRESS NOTE ADULT - SUBJECTIVE AND OBJECTIVE BOX
Subjective: no change in status. FiO2 30%. Not on vasopressors.   UO 2210 last 24h      MEDICATIONS  (STANDING):  chlorhexidine 0.12% Liquid 15 milliLiter(s) Oral Mucosa every 12 hours  chlorhexidine 2% Cloths 1 Application(s) Topical daily  dexMEDEtomidine Infusion 0.5 MICROgram(s)/kG/Hr (12.7 mL/Hr) IV Continuous <Continuous>  dextrose 10% Bolus 125 milliLiter(s) IV Bolus once  dextrose 5%. 1000 milliLiter(s) (100 mL/Hr) IV Continuous <Continuous>  dextrose 5%. 1000 milliLiter(s) (50 mL/Hr) IV Continuous <Continuous>  dextrose 50% Injectable 25 Gram(s) IV Push once  dextrose 50% Injectable 12.5 Gram(s) IV Push once  glucagon  Injectable 1 milliGRAM(s) IntraMuscular once  heparin   Injectable 5000 Unit(s) SubCutaneous every 8 hours  insulin lispro (ADMELOG) corrective regimen sliding scale   SubCutaneous every 6 hours  lactated ringers. 1000 milliLiter(s) (60 mL/Hr) IV Continuous <Continuous>  metoprolol tartrate 12.5 milliGRAM(s) Oral every 12 hours  mupirocin 2% Nasal 1 Application(s) Both Nostrils two times a day  pantoprazole  Injectable 40 milliGRAM(s) IV Push daily  potassium chloride   Powder 40 milliEquivalent(s) Oral every 4 hours  propofol Infusion 30 MICROgram(s)/kG/Min (18.2 mL/Hr) IV Continuous <Continuous>  vancomycin  IVPB 750 milliGRAM(s) IV Intermittent once    MEDICATIONS  (PRN):  acetaminophen   Oral Liquid .. 650 milliGRAM(s) Oral every 6 hours PRN Temp greater or equal to 38C (100.4F)  dextrose Oral Gel 15 Gram(s) Oral once PRN Blood Glucose LESS THAN 70 milliGRAM(s)/deciliter  sodium chloride 0.9% lock flush 10 milliLiter(s) IV Push every 1 hour PRN Pre/post blood products, medications, blood draw, and to maintain line patency          T(C): 36.7 (06-16-24 @ 07:48), Max: 37.7 (06-15-24 @ 20:06)  HR: 101 (06-16-24 @ 11:00) (80 - 136)  BP: 125/83 (06-16-24 @ 11:00) (81/52 - 135/73)  RR: 20 (06-16-24 @ 11:00) (11 - 30)  SpO2: 100% (06-16-24 @ 11:00) (99% - 100%)  Wt(kg): --    ABG - ( 16 Jun 2024 10:42 )  pH, Arterial: 7.45  pH, Blood: x     /  pCO2: 41    /  pO2: 138   / HCO3: 28    / Base Excess: 4.5   /  SaO2: 99.7                I&O's Detail    15 Paul 2024 07:01  -  16 Jun 2024 07:00  --------------------------------------------------------  IN:    Amiodarone: 150.3 mL    Dexmedetomidine: 349 mL    IV PiggyBack: 200 mL    IV PiggyBack: 250 mL    Lactated Ringers: 650 mL    Lactated Ringers Bolus: 500 mL    Nepro with Carb Steady: 190 mL    Phenylephrine: 79.8 mL    Propofol: 127.4 mL    Propofol: 215 mL    Sodium Bicarbonate: 400 mL  Total IN: 3111.5 mL    OUT:    Amiodarone: 0 mL    Indwelling Catheter - Urethral (mL): 2210 mL    Ketamine: 0 mL    Nasogastric/Oral tube (mL): 0 mL    Norepinephrine: 0 mL  Total OUT: 2210 mL    Total NET: 901.5 mL      16 Jun 2024 07:01  -  16 Jun 2024 11:26  --------------------------------------------------------  IN:    Dexmedetomidine: 17.7 mL    Dexmedetomidine: 51 mL    Enteral Tube Flush: 50 mL    Lactated Ringers: 240 mL    Nepro with Carb Steady: 40 mL    Propofol: 45 mL  Total IN: 443.7 mL    OUT:    Indwelling Catheter - Urethral (mL): 425 mL  Total OUT: 425 mL    Total NET: 18.7 mL          Mode: AC/ CMV (Assist Control/ Continuous Mandatory Ventilation)  RR (machine): 18  TV (machine): 500  FiO2: 30  PEEP: 5  ITime: 1  MAP: 10  PIP: 21       PHYSICAL EXAM:    GENERAL: Intubated.   NECK: Supple, no inc in JVP  CHEST/LUNG: dec BS  HEART: S1S2  ABDOMEN: Soft  EXTREMITIES:  pre-tibial edema. BL leg wounds, L foot partial amputation. Dressed.         LABS:  CBC Full  -  ( 16 Jun 2024 05:38 )  WBC Count : 9.49 K/uL  RBC Count : 3.48 M/uL  Hemoglobin : 10.9 g/dL  Hematocrit : 33.3 %  Platelet Count - Automated : 153 K/uL  Mean Cell Volume : 95.7 fl  Mean Cell Hemoglobin : 31.3 pg  Mean Cell Hemoglobin Concentration : 32.7 gm/dL  Auto Neutrophil # : 6.61 K/uL  Auto Lymphocyte # : 0.70 K/uL  Auto Monocyte # : 0.76 K/uL  Auto Eosinophil # : 1.33 K/uL  Auto Basophil # : 0.03 K/uL  Auto Neutrophil % : 69.7 %  Auto Lymphocyte % : 7.4 %  Auto Monocyte % : 8.0 %  Auto Eosinophil % : 14.0 %  Auto Basophil % : 0.3 %    06-16    144  |  110<H>  |  35<H>  ----------------------------<  268<H>  3.2<L>   |  30  |  1.90<H>    Ca    8.1<L>      16 Jun 2024 05:38  Phos  2.5     06-16  Mg     2.6     06-16    TPro  5.4<L>  /  Alb  1.9<L>  /  TBili  0.6  /  DBili  x   /  AST  191<H>  /  ALT  497<H>  /  AlkPhos  109  06-16    PT/INR - ( 16 Jun 2024 05:38 )   PT: 13.4 sec;   INR: 1.15 ratio         PTT - ( 16 Jun 2024 05:38 )  PTT:34.3 sec  Urinalysis Basic - ( 16 Jun 2024 05:38 )    Color: x / Appearance: x / SG: x / pH: x  Gluc: 268 mg/dL / Ketone: x  / Bili: x / Urobili: x   Blood: x / Protein: x / Nitrite: x   Leuk Esterase: x / RBC: x / WBC x   Sq Epi: x / Non Sq Epi: x / Bacteria: x      Culture Results:   Numerous Citrobacter freundii complex  Moderate Methicillin Resistant Staphylococcus aureus  Few Pseudomonas stutzeri Susceptibility to follow.  Few Bacteroides caccae "Susceptibilities not performed"  Rare Bacteroides vulgatus group "Susceptibilities not performed" (06-14 @ 02:47)  Culture Results:   No growth at 48 Hours (06-13 @ 22:55)  Culture Results:   No growth at 48 Hours (06-13 @ 22:50)          Impression:  1.OBI: ATN. Improving from peak Cr of 3.5, good UO  2.Metabolic acidosis: OBI, Lactic acidosis  3.s/p Cardiac arrest  4.Diabetes, PAD    Recommendations:   Volume management per PCCM  Avoid hypotension  Trend Cr, UO

## 2024-06-16 NOTE — PROGRESS NOTE ADULT - ATTENDING COMMENTS
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    --wean off propofol to assess mental status, continue precedex  --shock state resolved  afib better controlled, continue PO lopressor, restart AC with lovenox  --acute hypoxemia respiratory failure  PS trial as tolerates  --OBI improving, continue IVF  --ileus seems improved, tolerating trickle TF, increase as tolerates  shock liver improving, continue supportive care  --MRSA osteomyelitis, continue vanc  swab of R heel wound on admission growing numerous citrobacter, MRSA as well as other organisms, however suspect that this was a superficial swab on admission to ICU and not a deep tissue culture  --DM2, continue ISS 54 yo man with Hx CAD, afib on eliquis, CVA, htn, 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    --wean off propofol to assess mental status, continue precedex  --shock state resolved  afib better controlled, continue PO lopressor, restart AC with lovenox  --acute hypoxemia respiratory failure  PS trial as tolerates  --OBI improving, continue IVF  --ileus seems improved, tolerating trickle TF, increase as tolerates  shock liver improving, continue supportive care  --MRSA osteomyelitis, continue vanc  swab of R heel wound on admission growing numerous citrobacter, MRSA as well as other organisms, however suspect that this was a superficial swab on admission to ICU and not a deep tissue culture  --DM2, continue ISS  --updated sister Yecenia

## 2024-06-17 LAB
-  AMIKACIN: SIGNIFICANT CHANGE UP
-  AZTREONAM: SIGNIFICANT CHANGE UP
-  CEFEPIME: SIGNIFICANT CHANGE UP
-  CEFTRIAXONE: SIGNIFICANT CHANGE UP
-  CIPROFLOXACIN: SIGNIFICANT CHANGE UP
-  GENTAMICIN: SIGNIFICANT CHANGE UP
-  LEVOFLOXACIN: SIGNIFICANT CHANGE UP
-  MEROPENEM: SIGNIFICANT CHANGE UP
-  PIPERACILLIN/TAZOBACTAM: SIGNIFICANT CHANGE UP
-  TOBRAMYCIN: SIGNIFICANT CHANGE UP
-  TRIMETHOPRIM/SULFAMETHOXAZOLE: SIGNIFICANT CHANGE UP
ALBUMIN SERPL ELPH-MCNC: 2.1 G/DL — LOW (ref 3.3–5)
ALP SERPL-CCNC: 128 U/L — HIGH (ref 40–120)
ALT FLD-CCNC: 351 U/L — HIGH (ref 12–78)
ANION GAP SERPL CALC-SCNC: 5 MMOL/L — SIGNIFICANT CHANGE UP (ref 5–17)
ANION GAP SERPL CALC-SCNC: 9 MMOL/L — SIGNIFICANT CHANGE UP (ref 5–17)
APTT BLD: 34.5 SEC — SIGNIFICANT CHANGE UP (ref 24.5–35.6)
AST SERPL-CCNC: 79 U/L — HIGH (ref 15–37)
BASOPHILS # BLD AUTO: 0.03 K/UL — SIGNIFICANT CHANGE UP (ref 0–0.2)
BASOPHILS NFR BLD AUTO: 0.4 % — SIGNIFICANT CHANGE UP (ref 0–2)
BILIRUB SERPL-MCNC: 0.7 MG/DL — SIGNIFICANT CHANGE UP (ref 0.2–1.2)
BUN SERPL-MCNC: 18 MG/DL — SIGNIFICANT CHANGE UP (ref 7–23)
BUN SERPL-MCNC: 23 MG/DL — SIGNIFICANT CHANGE UP (ref 7–23)
CALCIUM SERPL-MCNC: 8.1 MG/DL — LOW (ref 8.5–10.1)
CALCIUM SERPL-MCNC: 8.8 MG/DL — SIGNIFICANT CHANGE UP (ref 8.5–10.1)
CHLORIDE SERPL-SCNC: 115 MMOL/L — HIGH (ref 96–108)
CHLORIDE SERPL-SCNC: 116 MMOL/L — HIGH (ref 96–108)
CO2 SERPL-SCNC: 23 MMOL/L — SIGNIFICANT CHANGE UP (ref 22–31)
CO2 SERPL-SCNC: 29 MMOL/L — SIGNIFICANT CHANGE UP (ref 22–31)
CREAT SERPL-MCNC: 1.2 MG/DL — SIGNIFICANT CHANGE UP (ref 0.5–1.3)
CREAT SERPL-MCNC: 1.4 MG/DL — HIGH (ref 0.5–1.3)
CULTURE RESULTS: ABNORMAL
CULTURE RESULTS: ABNORMAL
EGFR: 59 ML/MIN/1.73M2 — LOW
EGFR: 71 ML/MIN/1.73M2 — SIGNIFICANT CHANGE UP
EOSINOPHIL # BLD AUTO: 0.91 K/UL — HIGH (ref 0–0.5)
EOSINOPHIL NFR BLD AUTO: 10.9 % — HIGH (ref 0–6)
GLUCOSE BLDC GLUCOMTR-MCNC: 107 MG/DL — HIGH (ref 70–99)
GLUCOSE BLDC GLUCOMTR-MCNC: 113 MG/DL — HIGH (ref 70–99)
GLUCOSE BLDC GLUCOMTR-MCNC: 116 MG/DL — HIGH (ref 70–99)
GLUCOSE BLDC GLUCOMTR-MCNC: 135 MG/DL — HIGH (ref 70–99)
GLUCOSE BLDC GLUCOMTR-MCNC: 165 MG/DL — HIGH (ref 70–99)
GLUCOSE SERPL-MCNC: 109 MG/DL — HIGH (ref 70–99)
GLUCOSE SERPL-MCNC: 131 MG/DL — HIGH (ref 70–99)
HCT VFR BLD CALC: 35.2 % — LOW (ref 39–50)
HGB BLD-MCNC: 10.9 G/DL — LOW (ref 13–17)
IMM GRANULOCYTES NFR BLD AUTO: 0.6 % — SIGNIFICANT CHANGE UP (ref 0–0.9)
INR BLD: 1.09 RATIO — SIGNIFICANT CHANGE UP (ref 0.85–1.18)
LYMPHOCYTES # BLD AUTO: 1.07 K/UL — SIGNIFICANT CHANGE UP (ref 1–3.3)
LYMPHOCYTES # BLD AUTO: 12.8 % — LOW (ref 13–44)
MAGNESIUM SERPL-MCNC: 2 MG/DL — SIGNIFICANT CHANGE UP (ref 1.6–2.6)
MAGNESIUM SERPL-MCNC: 2.1 MG/DL — SIGNIFICANT CHANGE UP (ref 1.6–2.6)
MCHC RBC-ENTMCNC: 30.4 PG — SIGNIFICANT CHANGE UP (ref 27–34)
MCHC RBC-ENTMCNC: 31 GM/DL — LOW (ref 32–36)
MCV RBC AUTO: 98.1 FL — SIGNIFICANT CHANGE UP (ref 80–100)
METHOD TYPE: SIGNIFICANT CHANGE UP
MONOCYTES # BLD AUTO: 0.8 K/UL — SIGNIFICANT CHANGE UP (ref 0–0.9)
MONOCYTES NFR BLD AUTO: 9.6 % — SIGNIFICANT CHANGE UP (ref 2–14)
NEUTROPHILS # BLD AUTO: 5.51 K/UL — SIGNIFICANT CHANGE UP (ref 1.8–7.4)
NEUTROPHILS NFR BLD AUTO: 65.7 % — SIGNIFICANT CHANGE UP (ref 43–77)
NRBC # BLD: 0 /100 WBCS — SIGNIFICANT CHANGE UP (ref 0–0)
ORGANISM # SPEC MICROSCOPIC CNT: ABNORMAL
ORGANISM # SPEC MICROSCOPIC CNT: SIGNIFICANT CHANGE UP
PHOSPHATE SERPL-MCNC: 0.9 MG/DL — CRITICAL LOW (ref 2.5–4.5)
PHOSPHATE SERPL-MCNC: 2.8 MG/DL — SIGNIFICANT CHANGE UP (ref 2.5–4.5)
PLATELET # BLD AUTO: 162 K/UL — SIGNIFICANT CHANGE UP (ref 150–400)
POTASSIUM SERPL-MCNC: 3.2 MMOL/L — LOW (ref 3.5–5.3)
POTASSIUM SERPL-MCNC: 3.7 MMOL/L — SIGNIFICANT CHANGE UP (ref 3.5–5.3)
POTASSIUM SERPL-SCNC: 3.2 MMOL/L — LOW (ref 3.5–5.3)
POTASSIUM SERPL-SCNC: 3.7 MMOL/L — SIGNIFICANT CHANGE UP (ref 3.5–5.3)
PROT SERPL-MCNC: 5.6 G/DL — LOW (ref 6–8.3)
PROTHROM AB SERPL-ACNC: 12.7 SEC — SIGNIFICANT CHANGE UP (ref 9.5–13)
RBC # BLD: 3.59 M/UL — LOW (ref 4.2–5.8)
RBC # FLD: 14.5 % — SIGNIFICANT CHANGE UP (ref 10.3–14.5)
SODIUM SERPL-SCNC: 148 MMOL/L — HIGH (ref 135–145)
SODIUM SERPL-SCNC: 149 MMOL/L — HIGH (ref 135–145)
SPECIMEN SOURCE: SIGNIFICANT CHANGE UP
SPECIMEN SOURCE: SIGNIFICANT CHANGE UP
VANCOMYCIN TROUGH SERPL-MCNC: 13.2 UG/ML — SIGNIFICANT CHANGE UP (ref 10–20)
WBC # BLD: 8.37 K/UL — SIGNIFICANT CHANGE UP (ref 3.8–10.5)
WBC # FLD AUTO: 8.37 K/UL — SIGNIFICANT CHANGE UP (ref 3.8–10.5)

## 2024-06-17 PROCEDURE — 99291 CRITICAL CARE FIRST HOUR: CPT

## 2024-06-17 PROCEDURE — 99233 SBSQ HOSP IP/OBS HIGH 50: CPT | Mod: GC

## 2024-06-17 RX ORDER — DEXTROSE MONOHYDRATE AND SODIUM CHLORIDE 5; .3 G/100ML; G/100ML
1000 INJECTION, SOLUTION INTRAVENOUS
Refills: 0 | Status: DISCONTINUED | OUTPATIENT
Start: 2024-06-17 | End: 2024-06-21

## 2024-06-17 RX ORDER — METOPROLOL TARTRATE 50 MG
5 TABLET ORAL EVERY 6 HOURS
Refills: 0 | Status: DISCONTINUED | OUTPATIENT
Start: 2024-06-17 | End: 2024-07-02

## 2024-06-17 RX ORDER — FAMOTIDINE 40 MG
20 TABLET ORAL DAILY
Refills: 0 | Status: DISCONTINUED | OUTPATIENT
Start: 2024-06-17 | End: 2024-06-20

## 2024-06-17 RX ORDER — DIPHENHYDRAMINE HCL 12.5MG/5ML
25 ELIXIR ORAL ONCE
Refills: 0 | Status: COMPLETED | OUTPATIENT
Start: 2024-06-17 | End: 2024-06-17

## 2024-06-17 RX ORDER — POTASSIUM CHLORIDE 600 MG/1
10 TABLET, FILM COATED, EXTENDED RELEASE ORAL
Refills: 0 | Status: COMPLETED | OUTPATIENT
Start: 2024-06-17 | End: 2024-06-17

## 2024-06-17 RX ORDER — ACETAMINOPHEN 325 MG
1000 TABLET ORAL ONCE
Refills: 0 | Status: COMPLETED | OUTPATIENT
Start: 2024-06-17 | End: 2024-06-17

## 2024-06-17 RX ORDER — POTASSIUM PHOSPHATE, MONOBASIC POTASSIUM PHOSPHATE, DIBASIC INJECTION, 236; 224 MG/ML; MG/ML
30 SOLUTION, CONCENTRATE INTRAVENOUS ONCE
Refills: 0 | Status: COMPLETED | OUTPATIENT
Start: 2024-06-17 | End: 2024-06-17

## 2024-06-17 RX ORDER — DILTIAZEM HYDROCHLORIDE 240 MG/1
120 CAPSULE, EXTENDED RELEASE ORAL EVERY 8 HOURS
Refills: 0 | Status: DISCONTINUED | OUTPATIENT
Start: 2024-06-17 | End: 2024-06-23

## 2024-06-17 RX ORDER — FAMOTIDINE 40 MG
20 TABLET ORAL DAILY
Refills: 0 | Status: DISCONTINUED | OUTPATIENT
Start: 2024-06-17 | End: 2024-06-17

## 2024-06-17 RX ORDER — MORPHINE SULFATE 100 MG/1
2 TABLET, EXTENDED RELEASE ORAL ONCE
Refills: 0 | Status: DISCONTINUED | OUTPATIENT
Start: 2024-06-17 | End: 2024-06-17

## 2024-06-17 RX ORDER — POTASSIUM PHOSPHATE, MONOBASIC POTASSIUM PHOSPHATE, DIBASIC INJECTION, 236; 224 MG/ML; MG/ML
15 SOLUTION, CONCENTRATE INTRAVENOUS ONCE
Refills: 0 | Status: COMPLETED | OUTPATIENT
Start: 2024-06-17 | End: 2024-06-17

## 2024-06-17 RX ORDER — METOPROLOL TARTRATE 50 MG
5 TABLET ORAL EVERY 6 HOURS
Refills: 0 | Status: DISCONTINUED | OUTPATIENT
Start: 2024-06-17 | End: 2024-06-17

## 2024-06-17 RX ORDER — METOPROLOL TARTRATE 50 MG
50 TABLET ORAL EVERY 6 HOURS
Refills: 0 | Status: DISCONTINUED | OUTPATIENT
Start: 2024-06-17 | End: 2024-06-23

## 2024-06-17 RX ORDER — CALAMINE
1 LOTION (ML) TOPICAL
Refills: 0 | Status: DISCONTINUED | OUTPATIENT
Start: 2024-06-17 | End: 2024-07-05

## 2024-06-17 RX ORDER — VANCOMYCIN HYDROCHLORIDE 50 MG/ML
1000 KIT ORAL ONCE
Refills: 0 | Status: COMPLETED | OUTPATIENT
Start: 2024-06-17 | End: 2024-06-17

## 2024-06-17 RX ORDER — POTASSIUM CHLORIDE 600 MG/1
40 TABLET, FILM COATED, EXTENDED RELEASE ORAL EVERY 4 HOURS
Refills: 0 | Status: DISCONTINUED | OUTPATIENT
Start: 2024-06-17 | End: 2024-06-17

## 2024-06-17 RX ADMIN — VANCOMYCIN HYDROCHLORIDE 250 MILLIGRAM(S): KIT at 14:39

## 2024-06-17 RX ADMIN — DEXTROSE MONOHYDRATE AND SODIUM CHLORIDE 75 MILLILITER(S): 5; .3 INJECTION, SOLUTION INTRAVENOUS at 12:52

## 2024-06-17 RX ADMIN — INSULIN LISPRO 2: 100 INJECTION, SOLUTION SUBCUTANEOUS at 01:01

## 2024-06-17 RX ADMIN — Medication 400 MILLIGRAM(S): at 23:11

## 2024-06-17 RX ADMIN — Medication 20 MILLIGRAM(S): at 09:04

## 2024-06-17 RX ADMIN — Medication 400 MILLIGRAM(S): at 02:21

## 2024-06-17 RX ADMIN — Medication 1 SPRAY(S): at 12:00

## 2024-06-17 RX ADMIN — POTASSIUM PHOSPHATE, MONOBASIC POTASSIUM PHOSPHATE, DIBASIC INJECTION, 83.33 MILLIMOLE(S): 236; 224 SOLUTION, CONCENTRATE INTRAVENOUS at 10:11

## 2024-06-17 RX ADMIN — Medication 50 MILLIGRAM(S): at 18:10

## 2024-06-17 RX ADMIN — MORPHINE SULFATE 2 MILLIGRAM(S): 100 TABLET, EXTENDED RELEASE ORAL at 15:00

## 2024-06-17 RX ADMIN — DEXTROSE MONOHYDRATE AND SODIUM CHLORIDE 60 MILLILITER(S): 5; .3 INJECTION, SOLUTION INTRAVENOUS at 05:43

## 2024-06-17 RX ADMIN — ENOXAPARIN SODIUM 100 MILLIGRAM(S): 100 INJECTION SUBCUTANEOUS at 05:21

## 2024-06-17 RX ADMIN — Medication 25 MILLIGRAM(S): at 06:11

## 2024-06-17 RX ADMIN — DILTIAZEM HYDROCHLORIDE 120 MILLIGRAM(S): 240 CAPSULE, EXTENDED RELEASE ORAL at 21:36

## 2024-06-17 RX ADMIN — Medication 1000 MILLIGRAM(S): at 04:00

## 2024-06-17 RX ADMIN — Medication 1 SPRAY(S): at 23:12

## 2024-06-17 RX ADMIN — Medication 1 APPLICATION(S): at 11:59

## 2024-06-17 RX ADMIN — Medication 50 MILLIGRAM(S): at 12:01

## 2024-06-17 RX ADMIN — MUPIROCIN 1 APPLICATION(S): 20 OINTMENT TOPICAL at 18:10

## 2024-06-17 RX ADMIN — Medication 1 SPRAY(S): at 05:20

## 2024-06-17 RX ADMIN — POTASSIUM PHOSPHATE, MONOBASIC POTASSIUM PHOSPHATE, DIBASIC INJECTION, 62.5 MILLIMOLE(S): 236; 224 SOLUTION, CONCENTRATE INTRAVENOUS at 08:30

## 2024-06-17 RX ADMIN — DILTIAZEM HYDROCHLORIDE 120 MILLIGRAM(S): 240 CAPSULE, EXTENDED RELEASE ORAL at 11:59

## 2024-06-17 RX ADMIN — POTASSIUM CHLORIDE 100 MILLIEQUIVALENT(S): 600 TABLET, FILM COATED, EXTENDED RELEASE ORAL at 17:05

## 2024-06-17 RX ADMIN — Medication 5 MILLIGRAM(S): at 05:39

## 2024-06-17 RX ADMIN — MORPHINE SULFATE 2 MILLIGRAM(S): 100 TABLET, EXTENDED RELEASE ORAL at 14:36

## 2024-06-17 RX ADMIN — Medication 1 SPRAY(S): at 18:10

## 2024-06-17 RX ADMIN — DEXTROSE MONOHYDRATE AND SODIUM CHLORIDE 75 MILLILITER(S): 5; .3 INJECTION, SOLUTION INTRAVENOUS at 23:12

## 2024-06-17 RX ADMIN — Medication 50 MILLIGRAM(S): at 23:11

## 2024-06-17 RX ADMIN — Medication 25 MILLIGRAM(S): at 23:12

## 2024-06-17 RX ADMIN — POTASSIUM CHLORIDE 100 MILLIEQUIVALENT(S): 600 TABLET, FILM COATED, EXTENDED RELEASE ORAL at 18:10

## 2024-06-17 RX ADMIN — MUPIROCIN 1 APPLICATION(S): 20 OINTMENT TOPICAL at 05:21

## 2024-06-17 RX ADMIN — Medication 1 SPRAY(S): at 01:00

## 2024-06-17 RX ADMIN — POTASSIUM CHLORIDE 40 MILLIEQUIVALENT(S): 600 TABLET, FILM COATED, EXTENDED RELEASE ORAL at 10:11

## 2024-06-17 RX ADMIN — ENOXAPARIN SODIUM 100 MILLIGRAM(S): 100 INJECTION SUBCUTANEOUS at 18:10

## 2024-06-17 RX ADMIN — POTASSIUM CHLORIDE 100 MILLIEQUIVALENT(S): 600 TABLET, FILM COATED, EXTENDED RELEASE ORAL at 16:17

## 2024-06-17 NOTE — PROGRESS NOTE ADULT - ASSESSMENT
OBI: ATN  Hypokalemia  Metabolic acidosis: OBI, Lactic acidosis  Diabetes  Atrial fibrillation  s/p Cardiac arrest    Improving renal indies. To continue current meds. Change IVF to half NS. Potassium and phosphorous supplementation. Rate control. Monitor BP trend. Titrate BP meds as needed.    Monitor blood sugar levels. Insulin coverage as needed. Avoid nephrotoxic meds as possible. Avoid ACEI, ARB, NSAIDs and IV contrast.   Will follow electrolytes and renal function trend. D/w ICU team.

## 2024-06-17 NOTE — PROGRESS NOTE ADULT - ASSESSMENT
52 YO M with  AF on Eliquis, indwelling Aguilera, CAD s/p stents, CVA, DMT 2, Hypertension, osteomyelitis s/p debridement, PE, transferred from [facility] for difficulty in breathing.  His recent admission was for osteomyelitis and discharged on IV Vancomycin. Reported at [facility] he was given a dose of ertapenem and developed difficulty breathing followed by apnea. On ED arrival he was unresponsive, apneic, no palpable pulse or blood pressure, EKG rhythm HR < 20/min W/O P waves. Patient intubated,     RECOMMENDATIONS  1-Asystolic cardiac arrest, Acute hypoxic respiratory failure, Shock  - unclear etiology  -rec avoiding carbapenems and fine with just Vanco  -ICU support and monitoring    2-Osteomyeliits right calcaneous  6/4 S/p Selective debridement of wound 04-Jun-2024 10:52:57  Parviz Todd  6/4  TCx MRSA, Escherichia coli ESBL  Right calcaneus bone pathology:  -   Fragments of bone with chronic osteomyelitis.  -   Focal acute osteomyelitis cannot be ruled out.  C/w vancomycin 1 gram IV q12, goal trough 15-20, dosing per pharmacy protocol and plan had been with inability to rule out osteo rec 6 weeks so last day 7/18  PICC placed 6/7  -further recs to follow based on overall picture    Thank you for consulting us and involving us in the management of this most interesting and challenging case.  We will follow along in the care of this patient. Please call us at [phone] or text me directly on my cell# at [phone] with any concerns.

## 2024-06-17 NOTE — PROGRESS NOTE ADULT - ASSESSMENT
52 YO M with past medical history of  AFib (on Eliquis), indwelling Aguilera, cerebral aneurysm, CAD (s/p stents), CVA, T2DM, HTN, OM (s/p debridement), PE, perforated gastric ulcer s/p ometnopexy 2019 with Dr. Mejia, transferred from [facility] for difficulty in breathing.  His recent admission was for osteomyelitis and discharged on IV Vancomycin, admitted s/p cardiac arrest x2 w/ AHRF admitted to the ICU currently intubated, sedated, on levophed and epinephrine, NGT placed in the ED. Cardiology consulted for cardiac arrest and afib.     - No clear evidence of acute ischemia  - asa when able  - no statin given lft abn    - arrest and johnny issues likely iatrogenic from double dosing of avn blockers  - hr increased, prob aggravated by pressors, and meds wearing off  - given was still pressor dependent, rate control was attempted with dig and amio  - 6/15 pressors minimized and on primarily piero, and started on metop iv for rate  - may require sig higher dose of metoprolol enterally, and would increase as needed/tolerated  - hr now in 140-150's, and if remains so despite increased dosing of metoprolol, may need to re-institute cardizem gtt  - ac when able  - normal ef and mod-sev mr on echo, no need to repeat now     - atn with improving creat  - replete phosphorus  - keep i=o    Upon my evaluation, this patient is at high risk for imminent or life threatening deterioration due to resp failure, hypotension,  and other active medical issues which require my direct attention, intervention, and personal management.  I have personally spent >35 minutes  of critical care time exclusive of time spent on separate billing procedures. This includes review of laboratory data, radiology results, discussion with primary team, and monitoring for potential decompensation Interventions were performed as documented above.

## 2024-06-17 NOTE — PROGRESS NOTE ADULT - SUBJECTIVE AND OBJECTIVE BOX
Patient is a 53y old  Male who presents with a chief complaint of Acute respiratory failure with hypoxia (17 Jun 2024 10:29)    Patient seen in follow up for       PAST MEDICAL HISTORY:  No pertinent past medical history    Diabetes    No pertinent past medical history    Diabetes mellitus with no complication    Afib    Hypertension    BPH (benign prostatic hyperplasia)    Perforated gastric ulcer    Pulmonary embolism    History of non-ST elevation myocardial infarction (NSTEMI)    Osteomyelitis    CAD S/P percutaneous coronary angioplasty    Cerebrovascular accident      MEDICATIONS  (STANDING):  benzocaine 20% Spray 1 Spray(s) Topical every 6 hours  chlorhexidine 2% Cloths 1 Application(s) Topical daily  dextrose 10% Bolus 125 milliLiter(s) IV Bolus once  dextrose 5%. 1000 milliLiter(s) (50 mL/Hr) IV Continuous <Continuous>  dextrose 5%. 1000 milliLiter(s) (100 mL/Hr) IV Continuous <Continuous>  dextrose 50% Injectable 25 Gram(s) IV Push once  dextrose 50% Injectable 12.5 Gram(s) IV Push once  diltiazem    Tablet 120 milliGRAM(s) Oral every 8 hours  enoxaparin Injectable 100 milliGRAM(s) SubCutaneous every 12 hours  famotidine Injectable 20 milliGRAM(s) IV Push daily  glucagon  Injectable 1 milliGRAM(s) IntraMuscular once  insulin lispro (ADMELOG) corrective regimen sliding scale   SubCutaneous every 6 hours  lactated ringers. 1000 milliLiter(s) (60 mL/Hr) IV Continuous <Continuous>  metoprolol tartrate 50 milliGRAM(s) Oral every 6 hours  mupirocin 2% Nasal 1 Application(s) Both Nostrils two times a day  potassium chloride   Powder 40 milliEquivalent(s) Oral every 4 hours  vancomycin  IVPB 1000 milliGRAM(s) IV Intermittent once    MEDICATIONS  (PRN):  acetaminophen   Oral Liquid .. 650 milliGRAM(s) Oral every 6 hours PRN Temp greater or equal to 38C (100.4F)  dextrose Oral Gel 15 Gram(s) Oral once PRN Blood Glucose LESS THAN 70 milliGRAM(s)/deciliter  metoprolol tartrate Injectable 5 milliGRAM(s) IV Push every 6 hours PRN HR >140  sodium chloride 0.9% lock flush 10 milliLiter(s) IV Push every 1 hour PRN Pre/post blood products, medications, blood draw, and to maintain line patency    T(C): 36.7 (06-17-24 @ 11:50), Max: 38 (06-16-24 @ 18:00)  HR: 163 (06-17-24 @ 10:00) (80 - 163)  BP: 134/83 (06-17-24 @ 10:00) (104/61 - 143/77)  RR: 23 (06-17-24 @ 10:00) (11 - 30)  SpO2: 99% (06-17-24 @ 10:00) (93% - 100%)  Wt(kg): --  I&O's Detail    16 Jun 2024 07:01  -  17 Jun 2024 07:00  --------------------------------------------------------  IN:    Dexmedetomidine: 17.7 mL    Dexmedetomidine: 153 mL    Enteral Tube Flush: 150 mL    Glucerna 1.5: 210 mL    IV PiggyBack: 250 mL    Lactated Ringers: 1320 mL    Nepro with Carb Steady: 40 mL    Propofol: 69 mL  Total IN: 2209.7 mL    OUT:    Indwelling Catheter - Urethral (mL): 2280 mL  Total OUT: 2280 mL    Total NET: -70.3 mL      17 Jun 2024 07:01  -  17 Jun 2024 12:06  --------------------------------------------------------  IN:    IV PiggyBack: 190.8 mL    Lactated Ringers: 180 mL  Total IN: 370.8 mL    OUT:    Indwelling Catheter - Urethral (mL): 350 mL  Total OUT: 350 mL    Total NET: 20.8 mL          PHYSICAL EXAM:  General: No distress  Respiratory: b/l air entry  Cardiovascular: S1 S2  Gastrointestinal: soft  Extremities:  edema                              10.9   8.37  )-----------( 162      ( 17 Jun 2024 05:52 )             35.2     06-17    149<H>  |  115<H>  |  23  ----------------------------<  109<H>  3.2<L>   |  29  |  1.40<H>    Ca    8.8      17 Jun 2024 05:52  Phos  0.9     06-17  Mg     2.1     06-17    TPro  5.6<L>  /  Alb  2.1<L>  /  TBili  0.7  /  DBili  x   /  AST  79<H>  /  ALT  351<H>  /  AlkPhos  128<H>  06-17        LIVER FUNCTIONS - ( 17 Jun 2024 05:52 )  Alb: 2.1 g/dL / Pro: 5.6 g/dL / ALK PHOS: 128 U/L / ALT: 351 U/L / AST: 79 U/L / GGT: x           Urinalysis Basic - ( 17 Jun 2024 05:52 )    Color: x / Appearance: x / SG: x / pH: x  Gluc: 109 mg/dL / Ketone: x  / Bili: x / Urobili: x   Blood: x / Protein: x / Nitrite: x   Leuk Esterase: x / RBC: x / WBC x   Sq Epi: x / Non Sq Epi: x / Bacteria: x      ABG - ( 16 Jun 2024 10:42 )  pH, Arterial: 7.45  pH, Blood: x     /  pCO2: 41    /  pO2: 138   / HCO3: 28    / Base Excess: 4.5   /  SaO2: 99.7              Sodium, Serum: 149 (06-17 @ 05:52)  Sodium, Serum: 144 (06-16 @ 05:38)  Sodium, Serum: 143 (06-15 @ 05:40)  Sodium, Serum: 139 (06-14 @ 13:37)    Creatinine, Serum: 1.40 (06-17 @ 05:52)  Creatinine, Serum: 1.90 (06-16 @ 05:38)  Creatinine, Serum: 2.60 (06-15 @ 05:40)  Creatinine, Serum: 3.00 (06-14 @ 13:37)  Creatinine, Serum: 3.50 (06-14 @ 10:05)  Creatinine, Serum: 3.10 (06-14 @ 04:30)  Creatinine, Serum: 1.70 (06-13 @ 23:55)    Potassium, Serum: 3.2 (06-17 @ 05:52)  Potassium, Serum: 3.2 (06-16 @ 05:38)  Potassium, Serum: 3.6 (06-15 @ 05:40)  Potassium, Serum: 3.8 (06-14 @ 13:37)    Hemoglobin: 10.9 (06-17 @ 05:52)  Hemoglobin: 10.9 (06-16 @ 05:38)  Hemoglobin: 11.6 (06-15 @ 05:40)  Hemoglobin: 13.7 (06-14 @ 04:30)     Patient is a 53y old  Male who presents with a chief complaint of Acute respiratory failure with hypoxia (17 Jun 2024 10:29)    Patient seen in follow up for       PAST MEDICAL HISTORY:  No pertinent past medical history    Diabetes    No pertinent past medical history    Diabetes mellitus with no complication    Afib    Hypertension    BPH (benign prostatic hyperplasia)    Perforated gastric ulcer    Pulmonary embolism    History of non-ST elevation myocardial infarction (NSTEMI)    Osteomyelitis    CAD S/P percutaneous coronary angioplasty    Cerebrovascular accident      MEDICATIONS  (STANDING):  benzocaine 20% Spray 1 Spray(s) Topical every 6 hours  chlorhexidine 2% Cloths 1 Application(s) Topical daily  dextrose 10% Bolus 125 milliLiter(s) IV Bolus once  dextrose 5%. 1000 milliLiter(s) (50 mL/Hr) IV Continuous <Continuous>  dextrose 5%. 1000 milliLiter(s) (100 mL/Hr) IV Continuous <Continuous>  dextrose 50% Injectable 25 Gram(s) IV Push once  dextrose 50% Injectable 12.5 Gram(s) IV Push once  diltiazem    Tablet 120 milliGRAM(s) Oral every 8 hours  enoxaparin Injectable 100 milliGRAM(s) SubCutaneous every 12 hours  famotidine Injectable 20 milliGRAM(s) IV Push daily  glucagon  Injectable 1 milliGRAM(s) IntraMuscular once  insulin lispro (ADMELOG) corrective regimen sliding scale   SubCutaneous every 6 hours  lactated ringers. 1000 milliLiter(s) (60 mL/Hr) IV Continuous <Continuous>  metoprolol tartrate 50 milliGRAM(s) Oral every 6 hours  mupirocin 2% Nasal 1 Application(s) Both Nostrils two times a day  potassium chloride   Powder 40 milliEquivalent(s) Oral every 4 hours  vancomycin  IVPB 1000 milliGRAM(s) IV Intermittent once    MEDICATIONS  (PRN):  acetaminophen   Oral Liquid .. 650 milliGRAM(s) Oral every 6 hours PRN Temp greater or equal to 38C (100.4F)  dextrose Oral Gel 15 Gram(s) Oral once PRN Blood Glucose LESS THAN 70 milliGRAM(s)/deciliter  metoprolol tartrate Injectable 5 milliGRAM(s) IV Push every 6 hours PRN HR >140  sodium chloride 0.9% lock flush 10 milliLiter(s) IV Push every 1 hour PRN Pre/post blood products, medications, blood draw, and to maintain line patency    T(C): 36.7 (06-17-24 @ 11:50), Max: 38 (06-16-24 @ 18:00)  HR: 163 (06-17-24 @ 10:00) (80 - 163)  BP: 134/83 (06-17-24 @ 10:00) (104/61 - 143/77)  RR: 23 (06-17-24 @ 10:00) (11 - 30)  SpO2: 99% (06-17-24 @ 10:00) (93% - 100%)  Wt(kg): --  I&O's Detail    16 Jun 2024 07:01  -  17 Jun 2024 07:00  --------------------------------------------------------  IN:    Dexmedetomidine: 17.7 mL    Dexmedetomidine: 153 mL    Enteral Tube Flush: 150 mL    Glucerna 1.5: 210 mL    IV PiggyBack: 250 mL    Lactated Ringers: 1320 mL    Nepro with Carb Steady: 40 mL    Propofol: 69 mL  Total IN: 2209.7 mL    OUT:    Indwelling Catheter - Urethral (mL): 2280 mL  Total OUT: 2280 mL    Total NET: -70.3 mL      17 Jun 2024 07:01  -  17 Jun 2024 12:06  --------------------------------------------------------  IN:    IV PiggyBack: 190.8 mL    Lactated Ringers: 180 mL  Total IN: 370.8 mL    OUT:    Indwelling Catheter - Urethral (mL): 350 mL  Total OUT: 350 mL    Total NET: 20.8 mL          PHYSICAL EXAM:  General: No distress  Respiratory: b/l air entry  Cardiovascular: S1 S2  Gastrointestinal: soft  Extremities:  Left TMA, Chronic skin changes                              10.9   8.37  )-----------( 162      ( 17 Jun 2024 05:52 )             35.2     06-17    149<H>  |  115<H>  |  23  ----------------------------<  109<H>  3.2<L>   |  29  |  1.40<H>    Ca    8.8      17 Jun 2024 05:52  Phos  0.9     06-17  Mg     2.1     06-17    TPro  5.6<L>  /  Alb  2.1<L>  /  TBili  0.7  /  DBili  x   /  AST  79<H>  /  ALT  351<H>  /  AlkPhos  128<H>  06-17        LIVER FUNCTIONS - ( 17 Jun 2024 05:52 )  Alb: 2.1 g/dL / Pro: 5.6 g/dL / ALK PHOS: 128 U/L / ALT: 351 U/L / AST: 79 U/L / GGT: x           Urinalysis Basic - ( 17 Jun 2024 05:52 )    Color: x / Appearance: x / SG: x / pH: x  Gluc: 109 mg/dL / Ketone: x  / Bili: x / Urobili: x   Blood: x / Protein: x / Nitrite: x   Leuk Esterase: x / RBC: x / WBC x   Sq Epi: x / Non Sq Epi: x / Bacteria: x      ABG - ( 16 Jun 2024 10:42 )  pH, Arterial: 7.45  pH, Blood: x     /  pCO2: 41    /  pO2: 138   / HCO3: 28    / Base Excess: 4.5   /  SaO2: 99.7              Sodium, Serum: 149 (06-17 @ 05:52)  Sodium, Serum: 144 (06-16 @ 05:38)  Sodium, Serum: 143 (06-15 @ 05:40)  Sodium, Serum: 139 (06-14 @ 13:37)    Creatinine, Serum: 1.40 (06-17 @ 05:52)  Creatinine, Serum: 1.90 (06-16 @ 05:38)  Creatinine, Serum: 2.60 (06-15 @ 05:40)  Creatinine, Serum: 3.00 (06-14 @ 13:37)  Creatinine, Serum: 3.50 (06-14 @ 10:05)  Creatinine, Serum: 3.10 (06-14 @ 04:30)  Creatinine, Serum: 1.70 (06-13 @ 23:55)    Potassium, Serum: 3.2 (06-17 @ 05:52)  Potassium, Serum: 3.2 (06-16 @ 05:38)  Potassium, Serum: 3.6 (06-15 @ 05:40)  Potassium, Serum: 3.8 (06-14 @ 13:37)    Hemoglobin: 10.9 (06-17 @ 05:52)  Hemoglobin: 10.9 (06-16 @ 05:38)  Hemoglobin: 11.6 (06-15 @ 05:40)  Hemoglobin: 13.7 (06-14 @ 04:30)

## 2024-06-17 NOTE — PROGRESS NOTE ADULT - SUBJECTIVE AND OBJECTIVE BOX
Patient is a 53y old  Male who presents with a chief complaint of Acute respiratory failure with hypoxia (16 Jun 2024 11:26)    24 hour events: Pt s/p phenylephrine and amio load. pt on IV lopressor 5mg IV q6, still in a.fib to 140-170s sustained from early this morning. Patient seen and evaluated at bedside on nasal cannula. Per nurse, patient had large loose nonbloody BM that was very watery in nature. Pt afib 160s on monitor. Denies n/v, CP, SOB, abd pain, urinary sx, LE pain.     REVIEW OF SYSTEMS:   per above [facility]         ICU Vital Signs Last 24 Hrs  T(C): 36.7 (17 Jun 2024 11:50), Max: 38 (16 Jun 2024 18:00)  T(F): 98.1 (17 Jun 2024 11:50), Max: 100.4 (16 Jun 2024 18:00)  HR: 163 (17 Jun 2024 10:00) (92 - 163)  BP: 134/83 (17 Jun 2024 10:00) (104/66 - 143/77)  BP(mean): 104 (17 Jun 2024 10:00) (75 - 104)  ABP: --  ABP(mean): --  RR: 23 (17 Jun 2024 10:00) (12 - 25)  SpO2: 99% (17 Jun 2024 10:00) (93% - 100%)    O2 Parameters below as of 17 Jun 2024 09:00  Patient On (Oxygen Delivery Method): nasal cannula  O2 Flow (L/min): 6          Mode: CPAP with PS, FiO2: 30, PEEP: 5, PS: 7    CAPILLARY BLOOD GLUCOSE      POCT Blood Glucose.: 113 mg/dL (17 Jun 2024 12:03)      I&O's Summary    16 Jun 2024 07:01  -  17 Jun 2024 07:00  --------------------------------------------------------  IN: 2209.7 mL / OUT: 2280 mL / NET: -70.3 mL    17 Jun 2024 07:01  -  17 Jun 2024 12:55  --------------------------------------------------------  IN: 370.8 mL / OUT: 350 mL / NET: 20.8 mL        Physical Exam:   General: Ill appearing male, on IVF, on NC  HEENT: Pupils equal, reactive to light.  Symmetric.  PULM: Clear to auscultation bilaterally  CVS: +tachycardia, irregularly irregular   ABD: mildly Distended abdomen   EXT: Wounds to the b/l LE (LLE transmetatarsal amputation)  SKIN: b/l RUE Erythematous rash appreciated to the trunk and b/l UE extremities   NEURO: PERRL, + spontaneous movements     Meds:  vancomycin  IVPB IV Intermittent    diltiazem    Tablet Oral  metoprolol tartrate Oral  metoprolol tartrate Injectable IV Push PRN    dextrose 50% Injectable IV Push  dextrose 50% Injectable IV Push  dextrose Oral Gel Oral PRN  glucagon  Injectable IntraMuscular  insulin lispro (ADMELOG) corrective regimen sliding scale SubCutaneous      acetaminophen   Oral Liquid .. Oral PRN      enoxaparin Injectable SubCutaneous    famotidine Injectable IV Push      dextrose 10% Bolus IV Bolus  dextrose 5%. IV Continuous  dextrose 5%. IV Continuous  potassium chloride   Powder Oral  sodium chloride 0.45%. IV Continuous  sodium chloride 0.9% lock flush IV Push PRN      benzocaine 20% Spray Topical  chlorhexidine 2% Cloths Topical  mupirocin 2% Nasal Both Nostrils                              10.9   8.37  )-----------( 162      ( 17 Jun 2024 05:52 )             35.2       06-17    149<H>  |  115<H>  |  23  ----------------------------<  109<H>  3.2<L>   |  29  |  1.40<H>    Ca    8.8      17 Jun 2024 05:52  Phos  0.9     06-17  Mg     2.1     06-17    TPro  5.6<L>  /  Alb  2.1<L>  /  TBili  0.7  /  DBili  x   /  AST  79<H>  /  ALT  351<H>  /  AlkPhos  128<H>  06-17          PT/INR - ( 17 Jun 2024 05:52 )   PT: 12.7 sec;   INR: 1.09 ratio         PTT - ( 17 Jun 2024 05:52 )  PTT:34.5 sec  Urinalysis Basic - ( 17 Jun 2024 05:52 )    Color: x / Appearance: x / SG: x / pH: x  Gluc: 109 mg/dL / Ketone: x  / Bili: x / Urobili: x   Blood: x / Protein: x / Nitrite: x   Leuk Esterase: x / RBC: x / WBC x   Sq Epi: x / Non Sq Epi: x / Bacteria: x      .Surgical Swab RIGHT HEEL WOUND   Numerous Citrobacter freundii complex  Moderate Methicillin Resistant Staphylococcus aureus  Few Pseudomonas stutzeri  Few Bacteroides caccae "Susceptibilities not performed"  Rare Bacteroides vulgatus group "Susceptibilities not performed" -- 06-14 @ 02:47  .Blood Blood-Peripheral   No growth at 72 Hours -- 06-13 @ 22:55  .Blood Blood-Peripheral   No growth at 72 Hours -- 06-13 @ 22:50      Rapid RVP Result: NotDetec (06-14 @ 17:00)      Tubes/Lines: LIJ cenral line , R PICC (placed prior to hospital arrival) , L PIV  +NGT, +jacques cath changed in ICU    GLOBAL ISSUE/BEST PRACTICE:  Analgesia: Y  Sedation: Y  HOB elevation: Y  Stress ulcer prophylaxis: Y  VTE prophylaxis: SCDs  Glycemic control: Y  Nutrition: NPO    CODE STATUS: FULL CODE

## 2024-06-17 NOTE — PROGRESS NOTE ADULT - ATTENDING COMMENTS
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 Self-extubated yesterday and remains stable on minimal NC supplemental O2.     NEURO: pain control as needed   CV: shock state resolved; restart home PO metoprolol and diltiazem with prn lopressor, if HR remains elevated may require Diltiazem gtt until PO takes effect  - continue full AC with lovenox in place of Eliquis for now  PULM: acute hypoxemic respiratory failure in setting of cardiac arrest, now extubated and stable on minimal NC - continue to wean as able  GI: NPO pending full speech and swallow  - shock liver improving, continue to monitor  RENAL: OBI improving with IVF; change IVF to 1/2 NS for hypernatremia  - aggressive electrolyte repletion with repeat BMP after repletion completed  ID: continue vanc for MRSA osteomyelitis, continue vancomycin  ENDO: continue FS q6hr with ISS coverage  HEME: on full AC with lovenox, no additional DVT ppx required     Will continue to monitor in ICU until HR better controlled

## 2024-06-17 NOTE — PROGRESS NOTE ADULT - SUBJECTIVE AND OBJECTIVE BOX
Date of Service 06-17-24 @ 15:32    Patient is a 53y old  Male who presents with a chief complaint of Acute respiratory failure with hypoxia (17 Jun 2024 13:11)      INTERVAL /OVERNIGHT EVENTS: alert awake oriented    MEDICATIONS  (STANDING):  benzocaine 20% Spray 1 Spray(s) Topical every 6 hours  chlorhexidine 2% Cloths 1 Application(s) Topical daily  dextrose 10% Bolus 125 milliLiter(s) IV Bolus once  dextrose 5%. 1000 milliLiter(s) (50 mL/Hr) IV Continuous <Continuous>  dextrose 5%. 1000 milliLiter(s) (100 mL/Hr) IV Continuous <Continuous>  dextrose 50% Injectable 25 Gram(s) IV Push once  dextrose 50% Injectable 12.5 Gram(s) IV Push once  diltiazem    Tablet 120 milliGRAM(s) Oral every 8 hours  enoxaparin Injectable 100 milliGRAM(s) SubCutaneous every 12 hours  famotidine Injectable 20 milliGRAM(s) IV Push daily  glucagon  Injectable 1 milliGRAM(s) IntraMuscular once  insulin lispro (ADMELOG) corrective regimen sliding scale   SubCutaneous every 6 hours  metoprolol tartrate 50 milliGRAM(s) Oral every 6 hours  mupirocin 2% Nasal 1 Application(s) Both Nostrils two times a day  potassium chloride  10 mEq/100 mL IVPB 10 milliEquivalent(s) IV Intermittent every 1 hour  sodium chloride 0.45%. 1000 milliLiter(s) (75 mL/Hr) IV Continuous <Continuous>    MEDICATIONS  (PRN):  acetaminophen   Oral Liquid .. 650 milliGRAM(s) Oral every 6 hours PRN Temp greater or equal to 38C (100.4F)  dextrose Oral Gel 15 Gram(s) Oral once PRN Blood Glucose LESS THAN 70 milliGRAM(s)/deciliter  metoprolol tartrate Injectable 5 milliGRAM(s) IV Push every 6 hours PRN HR >140  sodium chloride 0.9% lock flush 10 milliLiter(s) IV Push every 1 hour PRN Pre/post blood products, medications, blood draw, and to maintain line patency      Allergies    ertapenem (Blisters; Rash)  fish (Hives)    Intolerances        REVIEW OF SYSTEMS:  CONSTITUTIONAL: No fever, weight loss, or fatigue  EYES: No eye pain, visual disturbances, or discharge  ENMT:  No difficulty hearing, tinnitus, vertigo; No sinus or throat pain  NECK: No pain or stiffness  RESPIRATORY: No cough, wheezing, chills or hemoptysis; No shortness of breath  CARDIOVASCULAR: No chest pain, palpitations, dizziness, or leg swelling  GASTROINTESTINAL: No abdominal or epigastric pain. No nausea, vomiting, or hematemesis; No diarrhea or constipation. No melena or hematochezia.  GENITOURINARY: No dysuria, frequency, hematuria, or incontinence  NEUROLOGICAL: No headaches, memory loss, loss of strength, numbness, or tremors  SKIN: + rashes  LYMPH NODES: No enlarged glands  ENDOCRINE: No heat or cold intolerance; No hair loss; No polydipsia or polyuria  MUSCULOSKELETAL: No joint pain or swelling; No muscle, back, or extremity pain  PSYCHIATRIC: No depression, anxiety, mood swings, or difficulty sleeping  HEME/LYMPH: No easy bruising, or bleeding gums  ALLERGY AND IMMUNOLOGIC: No hives or eczema    Vital Signs Last 24 Hrs  T(C): 36.7 (17 Jun 2024 11:50), Max: 38 (16 Jun 2024 18:00)  T(F): 98.1 (17 Jun 2024 11:50), Max: 100.4 (16 Jun 2024 18:00)  HR: 100 (17 Jun 2024 15:00) (97 - 163)  BP: 107/66 (17 Jun 2024 15:00) (97/69 - 143/77)  BP(mean): 81 (17 Jun 2024 15:00) (75 - 104)  RR: 15 (17 Jun 2024 15:00) (12 - 27)  SpO2: 100% (17 Jun 2024 15:00) (93% - 100%)    Parameters below as of 17 Jun 2024 15:00  Patient On (Oxygen Delivery Method): nasal cannula  O2 Flow (L/min): 3      PHYSICAL EXAM:  GENERAL: NAD, well-groomed, well-developed  HEAD:  Atraumatic, Normocephalic  EYES: EOMI, PERRLA, conjunctiva and sclera clear  ENMT: No tonsillar erythema, exudates, or enlargement; Moist mucous membranes, Good dentition, No lesions  NECK: Supple, No JVD, Normal thyroid  NERVOUS SYSTEM:  Alert & Oriented X3, Good concentration; Motor Strength 5/5 B/L upper and lower extremities; DTRs 2+ intact and symmetric  CHEST/LUNG: Clear to auscultation bilaterally; No rales, rhonchi, wheezing, or rubs  HEART: Regular rate and rhythm; No murmurs, rubs, or gallops  ABDOMEN: Soft, Nontender, Nondistended; Bowel sounds present  EXTREMITIES:  2+ Peripheral Pulses, No clubbing, cyanosis, + edema  LYMPH: No lymphadenopathy noted  SKIN: + rashes     LABS:                        10.9   8.37  )-----------( 162      ( 17 Jun 2024 05:52 )             35.2     17 Jun 2024 05:52    149    |  115    |  23     ----------------------------<  109    3.2     |  29     |  1.40     Ca    8.8        17 Jun 2024 05:52  Phos  0.9       17 Jun 2024 05:52  Mg     2.1       17 Jun 2024 05:52    TPro  5.6    /  Alb  2.1    /  TBili  0.7    /  DBili  x      /  AST  79     /  ALT  351    /  AlkPhos  128    17 Jun 2024 05:52    PT/INR - ( 17 Jun 2024 05:52 )   PT: 12.7 sec;   INR: 1.09 ratio         PTT - ( 17 Jun 2024 05:52 )  PTT:34.5 sec  Urinalysis Basic - ( 17 Jun 2024 05:52 )    Color: x / Appearance: x / SG: x / pH: x  Gluc: 109 mg/dL / Ketone: x  / Bili: x / Urobili: x   Blood: x / Protein: x / Nitrite: x   Leuk Esterase: x / RBC: x / WBC x   Sq Epi: x / Non Sq Epi: x / Bacteria: x      CAPILLARY BLOOD GLUCOSE      POCT Blood Glucose.: 113 mg/dL (17 Jun 2024 12:03)  POCT Blood Glucose.: 107 mg/dL (17 Jun 2024 05:31)  POCT Blood Glucose.: 165 mg/dL (17 Jun 2024 00:57)  POCT Blood Glucose.: 196 mg/dL (16 Jun 2024 23:13)  POCT Blood Glucose.: 238 mg/dL (16 Jun 2024 17:32)      RADIOLOGY & ADDITIONAL TESTS:    Notes Reviewed:  [x ] YES  [ ] NO    Care Discussed with Consultants/Other Providers [x ] YES  [ ] NO

## 2024-06-17 NOTE — PROGRESS NOTE ADULT - SUBJECTIVE AND OBJECTIVE BOX
[facility] - Brittany Lutz, Reynaldo Sweeney Savella, Cohen  Office Number:  [phone]    Patient resting comfortably in bed in NAD.   self extubated  feeling ok though not sure of what happened  af with rvr    ROS: negative unless otherwise mentioned.    Telemetry:  af with rvr    MEDICATIONS  (STANDING):  benzocaine 20% Spray 1 Spray(s) Topical every 6 hours  chlorhexidine 2% Cloths 1 Application(s) Topical daily  dextrose 10% Bolus 125 milliLiter(s) IV Bolus once  dextrose 5%. 1000 milliLiter(s) (50 mL/Hr) IV Continuous <Continuous>  dextrose 5%. 1000 milliLiter(s) (100 mL/Hr) IV Continuous <Continuous>  dextrose 50% Injectable 25 Gram(s) IV Push once  dextrose 50% Injectable 12.5 Gram(s) IV Push once  enoxaparin Injectable 100 milliGRAM(s) SubCutaneous every 12 hours  famotidine Injectable 20 milliGRAM(s) IV Push daily  glucagon  Injectable 1 milliGRAM(s) IntraMuscular once  insulin lispro (ADMELOG) corrective regimen sliding scale   SubCutaneous every 6 hours  lactated ringers. 1000 milliLiter(s) (60 mL/Hr) IV Continuous <Continuous>  metoprolol tartrate Injectable 5 milliGRAM(s) IV Push every 6 hours  mupirocin 2% Nasal 1 Application(s) Both Nostrils two times a day  potassium chloride   Powder 40 milliEquivalent(s) Oral every 4 hours    MEDICATIONS  (PRN):  acetaminophen   Oral Liquid .. 650 milliGRAM(s) Oral every 6 hours PRN Temp greater or equal to 38C (100.4F)  dextrose Oral Gel 15 Gram(s) Oral once PRN Blood Glucose LESS THAN 70 milliGRAM(s)/deciliter  sodium chloride 0.9% lock flush 10 milliLiter(s) IV Push every 1 hour PRN Pre/post blood products, medications, blood draw, and to maintain line patency      Allergies    ertapenem (Blisters; Rash)  fish (Hives)    Intolerances        Vital Signs Last 24 Hrs  T(C): 36.8 (17 Jun 2024 07:44), Max: 38 (16 Jun 2024 18:00)  T(F): 98.2 (17 Jun 2024 07:44), Max: 100.4 (16 Jun 2024 18:00)  HR: 157 (17 Jun 2024 09:00) (92 - 160)  BP: 125/68 (17 Jun 2024 09:00) (104/66 - 143/77)  BP(mean): 91 (17 Jun 2024 09:00) (75 - 99)  RR: 18 (17 Jun 2024 09:00) (12 - 25)  SpO2: 94% (17 Jun 2024 09:00) (93% - 100%)    Parameters below as of 17 Jun 2024 09:00  Patient On (Oxygen Delivery Method): nasal cannula  O2 Flow (L/min): 6      I&O's Summary    16 Jun 2024 07:01  -  17 Jun 2024 07:00  --------------------------------------------------------  IN: 2209.7 mL / OUT: 2280 mL / NET: -70.3 mL        ON EXAM:    Constitutional: well-nourished, well-developed, NAD   HEENT:  ngt ett, sclerae anicteric, conjunctivae clear, no oral cyanosis.  Pulmonary: Non-labored, breath sounds are clear bilaterally, No wheezing, rales or rhonchi  Cardiovascular: irregular and tachy, S1 and S2.  No murmur.  No rubs, gallops or clicks  Gastrointestinal: Bowel Sounds present, soft, nontender.   Lymph: mild peripheral edema. distal amp  Neurological: awake/alert  Skin: No rashes.  Psych:  awale/alert    LABS: All Labs Reviewed:                        10.9   8.37  )-----------( 162      ( 17 Jun 2024 05:52 )             35.2                         10.9   9.49  )-----------( 153      ( 16 Jun 2024 05:38 )             33.3                         11.6   15.77 )-----------( 238      ( 15 Paul 2024 05:40 )             35.6     17 Jun 2024 05:52    149    |  115    |  23     ----------------------------<  109    3.2     |  29     |  1.40   16 Jun 2024 05:38    144    |  110    |  35     ----------------------------<  268    3.2     |  30     |  1.90   15 Paul 2024 05:40    143    |  108    |  52     ----------------------------<  232    3.6     |  25     |  2.60     Ca    8.8        17 Jun 2024 05:52  Ca    8.1        16 Jun 2024 05:38  Ca    8.2        15 Paul 2024 05:40  Phos  0.9       17 Jun 2024 05:52  Phos  2.5       16 Jun 2024 05:38  Phos  2.7       15 Paul 2024 05:40  Mg     2.1       17 Jun 2024 05:52  Mg     2.6       16 Jun 2024 05:38  Mg     1.5       15 Paul 2024 05:40    TPro  5.6    /  Alb  2.1    /  TBili  0.7    /  DBili  x      /  AST  79     /  ALT  351    /  AlkPhos  128    17 Jun 2024 05:52  TPro  5.4    /  Alb  1.9    /  TBili  0.6    /  DBili  x      /  AST  191    /  ALT  497    /  AlkPhos  109    16 Jun 2024 05:38  TPro  5.4    /  Alb  2.1    /  TBili  0.5    /  DBili  x      /  AST  219    /  ALT  380    /  AlkPhos  107    15 Paul 2024 05:40    PT/INR - ( 17 Jun 2024 05:52 )   PT: 12.7 sec;   INR: 1.09 ratio         PTT - ( 17 Jun 2024 05:52 )  PTT:34.5 sec      Blood Culture: Organism Citrobacter freundii complex  Gram Stain Blood -- Gram Stain --  Specimen Source .Surgical Swab RIGHT HEEL WOUND  Culture-Blood --    Organism --  Gram Stain Blood -- Gram Stain --  Specimen Source .Blood Blood-Peripheral  Culture-Blood --    Organism --  Gram Stain Blood -- Gram Stain --  Specimen Source .Blood Blood-Peripheral  Culture-Blood --

## 2024-06-17 NOTE — PROGRESS NOTE ADULT - ASSESSMENT
53 year old male with a PMH of AF on Eliquis, indwelling jacques, CAD s/p stents, CVA, DM, HTN, hx PE, recent hospitalization discharged yesterday after an admission for R heel osteomyelitis w/ debridement who presented to the ED from [facility] for difficulty breathing.    Problem list:  Asystolic cardiac arrest  Acute hypoxic respiratory failure  Shock (septic v cardiogenic)   Lactic/respiratory acidosis  OBI  Transaminitis  Hypomagnesemia  Afib  Elevated INR    Neuro: Acute metabolic encephalopathy 2/2 ?shock vs. anoxic injury   - s/p ketamine, precedex, propofol   - Serial neurologic assessments    CV: Cardiogenic shock 2/2 bradycardia vs distributive shock 2/2 anaphylactic reaction to ertapenem.   - Per medication review w/ [facility] eMAR, patient received total cardizem ER 720mg and Toprol XL 200mg in last 24 hrs  --> ?cardiogenic shock 2/2 bradycardia  - Calcium gluconate to stabilize cardiac membrane and BB toxicity  -?distributive shock 2/2 anaphylaxis to ertapenem  - EKG on arrival with slow afib but no signs of ischemia.   - No longer in shock; phenylephrine discontinued   - Improvement in HR, remains in afib; off amio gtt.  - Patient continues to have HR in 140-170s. --- Pt to start metoprolol tartrate 50mg q6h PO (Pt on toprol 100mg qd at home) and cardizem 120mg q8h PO (Pt on cardizem 360 qd at home) w/ hold parameters  - Continue lopressor 5 IV q6 changed to PRN orders w/ hold parameters.  - Coag panel now within normal limits; on FD Lovenox for AC  - Trops peaked, downtrending   - Echo from March w/ normal LVEF.    - TTE 6/14: Left ventricular systolic function is normal with an ejection fraction visually estimated at 55 to 60 %. There are no regional wall motion abnormalities seen. Moderate to severe left ventricular hypertrophy. Normal right ventricular cavity size, with normal wall thickness, and normal systolic function. The left atrium is severely dilated. The right atrium is severely dilated.  - Cardiology following    Pulm: Acute hypoxic respiratory failure /resp acidosis  - C/w full vent support.   - Metabolic acidosis improved; repeat ABG this AM minimally changed from prior   - SBTs as able.     GI:   - multiple dilated bowel loops on CT  - CT AP: Numerous mildly distended gas-filled and fluid-filled small bowel loops with air-fluid levels and no transition point involving the entire small bowel is most suggestive of ileus, ascites or pneumoperitoneum. Emphysema.  - NGT to suction, now minimal drainage   - Tolerating trickle feeds; initiate regular tube feeds   - patient passed dysphagia screening  - Patient continuing to have diarrhea. d/c naloxegrol, senna, miralax. concerns for cdif - c-diff/GI PCR ordered,  f/u results  - c/w PPI for GI prophylaxis.   - Transaminitis improving/stable     Renal: OBI likely 2/2 ATN from cardiac arrest vs prerenal   - Cr remains elevated, now downtrending   - Lactate downtrending, now wnl   - Profound lactic acidosis likely from cardiac arrest   - S/p 1L LR bolus , s/p LR IVF  - start 1/2 NS IVF for mild hypernatremia  - Replete lytes PRN  K>4, Mg>2.     ID: ongoing treatment for OM s/p R heel debridement during prior hospitalization  - Was receiving ertapenem and vancomycin, however as per prior ID notes ertapenem was stopped due to development of rash.    - Wound/tissue cultures from prior admission with MRSA, ESBL E.coli, E faecalis.    - Re-dose Vanc; 15.3 this AM   - BCx NGTD 48hr  - UA large LE, neg nitrites, WBC TNTC, few bacteria; UCx pending   - Monitor patients clinical presentation, plan to re-check UA if any new febrile episodes   - Continue mupirocin   - pt with multiple bouts of liquid, nonbloody diarrhea - c-diff/GI PCR ordered,  f/u results  - ID following    Endocrine: Hx of DM  - Monitor FS q6h   - FS stable on MDISS  - TSH elevated. T3/T4 wnl; likely 2/2 recovering from sick euthyroid syndrome    Heme:  - On Eliquis as an outpatient.   - INR now wnl; continue FD lovenox     DISPO: FULL CODE

## 2024-06-17 NOTE — PHARMACOTHERAPY INTERVENTION NOTE - COMMENTS
Vancomycin Dosing Per Pharmacy:  Patient is a 54 yo male being treated for R heel wound/osteomyelitis, currently ordered for IV vancomycin dosing by levels due to worsening renal function. Renal function improving, patient to receive 1g x 1 dose today with repeat level due 6/18 at 5am.    1. Indication for the vancomycin: R heel wound/osteomyelitis  2. Requesting Provider: Dr. Whitten/ICU team  3. Current plan and dosing regimen: 1000 mg x 1 dose   4. Day of therapy: 14  5. Cultures: Tissue Cx 5/26/24: E faecalis + MRSA. Tissue Cx 6/4/24: ESBL E coli + E faecalis. R heel surgical swab 6/14/24: MRSA + citrobacter freundii + pseudomonas stutzeri.  6. Target trough or AUC/BAYLEE: 400-600  7. Day and time level is due: 6/18 at 05:00  8. Previous levels: 6/4 (19:31): 22, 6/5 (05:47): 14, 6/6 (20:20): 14, 6/9 (04:58): 14.4, 6/11 (04:30): 15.7, 6/13 (05:54): 17.3, 6/14 (01:25): 15.2, 6/14 (04:30): 16.1, 6/15 (5:40): 17.5, 6/16 (5:38): 15.3, 6/17 (05:52): 13.2  9. Expected 24-hour AUC: 465

## 2024-06-17 NOTE — PROGRESS NOTE ADULT - PROBLEM SELECTOR PLAN 1
S/P Intubated and admitted to ICU  etiology unclear  S/P vent support per intensivist  now extubated

## 2024-06-17 NOTE — CHART NOTE - NSCHARTNOTEFT_GEN_A_CORE
Assessment: Pt seen for nutrition follow-up. Chart reviewed, hospital course noted.    Brief hx: Pt is a "53 year old male with a PMH of AF on Eliquis, indwelling jacques, CAD s/p stents, CVA, DM, HTN, hx PE, recent hospitalization discharged yesterday after an admission for R heel osteomyelitis w/ debridement who presented to the ED from [facility] for difficulty breathing."    Visited pt at bedside this am. Pt previously intubated, was receiving NGT feedings of Glucerna 1.5 at that time. Per MD note, pt self extubated yesterday evening while on weaning trial. Transitioned to NRB. Pt currently NPO. On IVFs- LR@60ml/hr. SLP consulted. Will follow for recommendations. No N/V per chart review. +BM 6/17. Diarrhea noted. Reviewed labs; K 3.2 (L), Phos 0.9 (L), supplementation ordered. Recommend consistent carbohydrate, DASH/TLC diet at tolerated consistency per SLP recs when medically feasible. Assistance/encouragement at meal times as needed. RD remains available and will continue to follow-up.     Factors impacting intake: [ ] none [ ] nausea  [ ] vomiting [ ] diarrhea [ ] constipation  [ ]chewing problems [ ] swallowing issues  [X] other: NPO, awaiting SLP evaluation    Diet Prescription: Diet, NPO (06-16-24 @ 20:34)    Intake: NPO    Current Weight: Weight (kg): 101.3 (06-14 @ 01:00), 6/16 232.3# (1+ generalized, 3+ BL arm/hand edema noted)  % Weight Change    Pertinent Medications: MEDICATIONS  (STANDING):  benzocaine 20% Spray 1 Spray(s) Topical every 6 hours  chlorhexidine 2% Cloths 1 Application(s) Topical daily  dextrose 10% Bolus 125 milliLiter(s) IV Bolus once  dextrose 5%. 1000 milliLiter(s) (50 mL/Hr) IV Continuous <Continuous>  dextrose 5%. 1000 milliLiter(s) (100 mL/Hr) IV Continuous <Continuous>  dextrose 50% Injectable 25 Gram(s) IV Push once  dextrose 50% Injectable 12.5 Gram(s) IV Push once  enoxaparin Injectable 100 milliGRAM(s) SubCutaneous every 12 hours  famotidine Injectable 20 milliGRAM(s) IV Push daily  glucagon  Injectable 1 milliGRAM(s) IntraMuscular once  insulin lispro (ADMELOG) corrective regimen sliding scale   SubCutaneous every 6 hours  lactated ringers. 1000 milliLiter(s) (60 mL/Hr) IV Continuous <Continuous>  metoprolol tartrate Injectable 5 milliGRAM(s) IV Push every 6 hours  mupirocin 2% Nasal 1 Application(s) Both Nostrils two times a day  potassium chloride   Powder 40 milliEquivalent(s) Oral every 4 hours  vancomycin  IVPB 1000 milliGRAM(s) IV Intermittent once    MEDICATIONS  (PRN):  acetaminophen   Oral Liquid .. 650 milliGRAM(s) Oral every 6 hours PRN Temp greater or equal to 38C (100.4F)  dextrose Oral Gel 15 Gram(s) Oral once PRN Blood Glucose LESS THAN 70 milliGRAM(s)/deciliter  sodium chloride 0.9% lock flush 10 milliLiter(s) IV Push every 1 hour PRN Pre/post blood products, medications, blood draw, and to maintain line patency    Pertinent Labs: 06-17 Na149 mmol/L<H> Glu 109 mg/dL<H> K+ 3.2 mmol/L<L> Cr  1.40 mg/dL<H> BUN 23 mg/dL 06-17 Phos 0.9 mg/dL<LL> 06-17 Alb 2.1 g/dL<L>    CAPILLARY BLOOD GLUCOSE  POCT Blood Glucose.: 107 mg/dL (17 Jun 2024 05:31)  POCT Blood Glucose.: 165 mg/dL (17 Jun 2024 00:57)  POCT Blood Glucose.: 196 mg/dL (16 Jun 2024 23:13)  POCT Blood Glucose.: 238 mg/dL (16 Jun 2024 17:32)  POCT Blood Glucose.: 233 mg/dL (16 Jun 2024 11:55)    Skin: R heel wound, L foot wound, stage II L buttocks, stage II sacrum    Estimated Needs:   [X] no change since previous assessment: based on #/88.4kg  25-30kcal/kg (2210-2652kcal)  1.2-1.4g pro/kg (106-124gm protein)  [ ] recalculated:     Previous Nutrition Diagnosis:   [ ] Inadequate Energy Intake [ ]Inadequate Oral Intake [ ] Excessive Energy Intake   [ ] Underweight [X] Increased Nutrient Needs (kcal, protein, micronutrients) [ ] Overweight/Obesity   [ ] Altered GI Function [ ] Unintended Weight Loss [ ] Food & Nutrition Related Knowledge Deficit [ ] Malnutrition     Nutrition Diagnosis is [X] ongoing  [ ] resolved [ ] not applicable     New Nutrition Diagnosis: [X] not applicable     Interventions:   Recommend  [ ] Change Diet To:  [ ] Nutrition Supplement  [ ] Nutrition Support  [X] Other: Pt to remain NPO with IVFs at this time, awaiting swallow evaluation; will follow for SLP recommendations  Electrolyte replacement prn  Recommend Banatrol plus BID once diet advances    Monitoring and Evaluation:   [X] PO intake [ x ] Tolerance to diet prescription [ x ] weights [ x ] labs[ x ] follow up per protocol  [X] other: s/s GI distress, bowel function, skin integrity/ edema

## 2024-06-18 LAB
ALBUMIN SERPL ELPH-MCNC: 2.2 G/DL — LOW (ref 3.3–5)
ALP SERPL-CCNC: 123 U/L — HIGH (ref 40–120)
ALT FLD-CCNC: 236 U/L — HIGH (ref 12–78)
ANION GAP SERPL CALC-SCNC: 5 MMOL/L — SIGNIFICANT CHANGE UP (ref 5–17)
APTT BLD: 39.3 SEC — HIGH (ref 24.5–35.6)
AST SERPL-CCNC: 51 U/L — HIGH (ref 15–37)
BASOPHILS # BLD AUTO: 0.03 K/UL — SIGNIFICANT CHANGE UP (ref 0–0.2)
BASOPHILS NFR BLD AUTO: 0.4 % — SIGNIFICANT CHANGE UP (ref 0–2)
BILIRUB SERPL-MCNC: 0.7 MG/DL — SIGNIFICANT CHANGE UP (ref 0.2–1.2)
BUN SERPL-MCNC: 15 MG/DL — SIGNIFICANT CHANGE UP (ref 7–23)
CALCIUM SERPL-MCNC: 8.1 MG/DL — LOW (ref 8.5–10.1)
CHLORIDE SERPL-SCNC: 115 MMOL/L — HIGH (ref 96–108)
CO2 SERPL-SCNC: 26 MMOL/L — SIGNIFICANT CHANGE UP (ref 22–31)
CREAT SERPL-MCNC: 1.2 MG/DL — SIGNIFICANT CHANGE UP (ref 0.5–1.3)
EGFR: 71 ML/MIN/1.73M2 — SIGNIFICANT CHANGE UP
EOSINOPHIL # BLD AUTO: 0.96 K/UL — HIGH (ref 0–0.5)
EOSINOPHIL NFR BLD AUTO: 13.5 % — HIGH (ref 0–6)
GLUCOSE BLDC GLUCOMTR-MCNC: 131 MG/DL — HIGH (ref 70–99)
GLUCOSE BLDC GLUCOMTR-MCNC: 132 MG/DL — HIGH (ref 70–99)
GLUCOSE BLDC GLUCOMTR-MCNC: 140 MG/DL — HIGH (ref 70–99)
GLUCOSE BLDC GLUCOMTR-MCNC: 99 MG/DL — SIGNIFICANT CHANGE UP (ref 70–99)
GLUCOSE SERPL-MCNC: 119 MG/DL — HIGH (ref 70–99)
HCT VFR BLD CALC: 35.1 % — LOW (ref 39–50)
HGB BLD-MCNC: 10.8 G/DL — LOW (ref 13–17)
IMM GRANULOCYTES NFR BLD AUTO: 0.8 % — SIGNIFICANT CHANGE UP (ref 0–0.9)
INR BLD: 1.05 RATIO — SIGNIFICANT CHANGE UP (ref 0.85–1.18)
LYMPHOCYTES # BLD AUTO: 0.95 K/UL — LOW (ref 1–3.3)
LYMPHOCYTES # BLD AUTO: 13.3 % — SIGNIFICANT CHANGE UP (ref 13–44)
MAGNESIUM SERPL-MCNC: 1.9 MG/DL — SIGNIFICANT CHANGE UP (ref 1.6–2.6)
MCHC RBC-ENTMCNC: 30.3 PG — SIGNIFICANT CHANGE UP (ref 27–34)
MCHC RBC-ENTMCNC: 30.8 GM/DL — LOW (ref 32–36)
MCV RBC AUTO: 98.3 FL — SIGNIFICANT CHANGE UP (ref 80–100)
MONOCYTES # BLD AUTO: 0.64 K/UL — SIGNIFICANT CHANGE UP (ref 0–0.9)
MONOCYTES NFR BLD AUTO: 9 % — SIGNIFICANT CHANGE UP (ref 2–14)
NEUTROPHILS # BLD AUTO: 4.48 K/UL — SIGNIFICANT CHANGE UP (ref 1.8–7.4)
NEUTROPHILS NFR BLD AUTO: 63 % — SIGNIFICANT CHANGE UP (ref 43–77)
NRBC # BLD: 0 /100 WBCS — SIGNIFICANT CHANGE UP (ref 0–0)
PHOSPHATE SERPL-MCNC: 2.2 MG/DL — LOW (ref 2.5–4.5)
PLATELET # BLD AUTO: 165 K/UL — SIGNIFICANT CHANGE UP (ref 150–400)
POTASSIUM SERPL-MCNC: 3.6 MMOL/L — SIGNIFICANT CHANGE UP (ref 3.5–5.3)
POTASSIUM SERPL-SCNC: 3.6 MMOL/L — SIGNIFICANT CHANGE UP (ref 3.5–5.3)
PROT SERPL-MCNC: 5.7 G/DL — LOW (ref 6–8.3)
PROTHROM AB SERPL-ACNC: 12.3 SEC — SIGNIFICANT CHANGE UP (ref 9.5–13)
RBC # BLD: 3.57 M/UL — LOW (ref 4.2–5.8)
RBC # FLD: 14.5 % — SIGNIFICANT CHANGE UP (ref 10.3–14.5)
SODIUM SERPL-SCNC: 146 MMOL/L — HIGH (ref 135–145)
VANCOMYCIN FLD-MCNC: 11.8 UG/ML — SIGNIFICANT CHANGE UP (ref 10–25)
WBC # BLD: 7.12 K/UL — SIGNIFICANT CHANGE UP (ref 3.8–10.5)
WBC # FLD AUTO: 7.12 K/UL — SIGNIFICANT CHANGE UP (ref 3.8–10.5)

## 2024-06-18 PROCEDURE — 99291 CRITICAL CARE FIRST HOUR: CPT

## 2024-06-18 PROCEDURE — 71045 X-RAY EXAM CHEST 1 VIEW: CPT | Mod: 26

## 2024-06-18 PROCEDURE — 99233 SBSQ HOSP IP/OBS HIGH 50: CPT | Mod: GC

## 2024-06-18 RX ORDER — HYDROXYZINE PAMOATE 50 MG/1
25 CAPSULE ORAL ONCE
Refills: 0 | Status: COMPLETED | OUTPATIENT
Start: 2024-06-18 | End: 2024-06-18

## 2024-06-18 RX ORDER — ACETAMINOPHEN 325 MG
650 TABLET ORAL EVERY 6 HOURS
Refills: 0 | Status: DISCONTINUED | OUTPATIENT
Start: 2024-06-18 | End: 2024-07-05

## 2024-06-18 RX ORDER — POTASSIUM PHOSPHATE, MONOBASIC POTASSIUM PHOSPHATE, DIBASIC INJECTION, 236; 224 MG/ML; MG/ML
15 SOLUTION, CONCENTRATE INTRAVENOUS ONCE
Refills: 0 | Status: COMPLETED | OUTPATIENT
Start: 2024-06-18 | End: 2024-06-18

## 2024-06-18 RX ORDER — INSULIN LISPRO 100 [IU]/ML
INJECTION, SOLUTION SUBCUTANEOUS
Refills: 0 | Status: DISCONTINUED | OUTPATIENT
Start: 2024-06-18 | End: 2024-07-05

## 2024-06-18 RX ORDER — VANCOMYCIN HYDROCHLORIDE 50 MG/ML
1250 KIT ORAL EVERY 12 HOURS
Refills: 0 | Status: DISCONTINUED | OUTPATIENT
Start: 2024-06-18 | End: 2024-06-20

## 2024-06-18 RX ORDER — TIOTROPIUM BROMIDE AND OLODATEROL 3.124; 2.736 UG/1; UG/1
2 SPRAY, METERED RESPIRATORY (INHALATION) DAILY
Refills: 0 | Status: DISCONTINUED | OUTPATIENT
Start: 2024-06-18 | End: 2024-07-05

## 2024-06-18 RX ORDER — VANCOMYCIN HYDROCHLORIDE 50 MG/ML
KIT ORAL
Refills: 0 | Status: DISCONTINUED | OUTPATIENT
Start: 2024-06-18 | End: 2024-06-20

## 2024-06-18 RX ORDER — ATORVASTATIN CALCIUM 20 MG/1
40 TABLET, FILM COATED ORAL AT BEDTIME
Refills: 0 | Status: DISCONTINUED | OUTPATIENT
Start: 2024-06-18 | End: 2024-07-05

## 2024-06-18 RX ORDER — GABAPENTIN
100 POWDER (GRAM) MISCELLANEOUS EVERY 12 HOURS
Refills: 0 | Status: DISCONTINUED | OUTPATIENT
Start: 2024-06-18 | End: 2024-07-05

## 2024-06-18 RX ORDER — OXYCODONE HYDROCHLORIDE 100 MG/5ML
5 SOLUTION ORAL EVERY 6 HOURS
Refills: 0 | Status: DISCONTINUED | OUTPATIENT
Start: 2024-06-18 | End: 2024-06-19

## 2024-06-18 RX ORDER — OXYCODONE HYDROCHLORIDE 100 MG/5ML
10 SOLUTION ORAL EVERY 6 HOURS
Refills: 0 | Status: DISCONTINUED | OUTPATIENT
Start: 2024-06-18 | End: 2024-06-25

## 2024-06-18 RX ORDER — MORPHINE SULFATE 100 MG/1
2 TABLET, EXTENDED RELEASE ORAL ONCE
Refills: 0 | Status: DISCONTINUED | OUTPATIENT
Start: 2024-06-18 | End: 2024-06-18

## 2024-06-18 RX ORDER — VANCOMYCIN HYDROCHLORIDE 50 MG/ML
1250 KIT ORAL ONCE
Refills: 0 | Status: COMPLETED | OUTPATIENT
Start: 2024-06-18 | End: 2024-06-18

## 2024-06-18 RX ADMIN — Medication 50 MILLIGRAM(S): at 05:26

## 2024-06-18 RX ADMIN — VANCOMYCIN HYDROCHLORIDE 166.67 MILLIGRAM(S): KIT at 21:19

## 2024-06-18 RX ADMIN — DEXTROSE MONOHYDRATE AND SODIUM CHLORIDE 75 MILLILITER(S): 5; .3 INJECTION, SOLUTION INTRAVENOUS at 12:36

## 2024-06-18 RX ADMIN — MUPIROCIN 1 APPLICATION(S): 20 OINTMENT TOPICAL at 05:26

## 2024-06-18 RX ADMIN — Medication 1 APPLICATION(S): at 05:26

## 2024-06-18 RX ADMIN — TIOTROPIUM BROMIDE AND OLODATEROL 2 PUFF(S): 3.124; 2.736 SPRAY, METERED RESPIRATORY (INHALATION) at 14:34

## 2024-06-18 RX ADMIN — Medication 50 MILLIGRAM(S): at 12:35

## 2024-06-18 RX ADMIN — OXYCODONE HYDROCHLORIDE 10 MILLIGRAM(S): 100 SOLUTION ORAL at 16:10

## 2024-06-18 RX ADMIN — DEXTROSE MONOHYDRATE AND SODIUM CHLORIDE 75 MILLILITER(S): 5; .3 INJECTION, SOLUTION INTRAVENOUS at 21:19

## 2024-06-18 RX ADMIN — ENOXAPARIN SODIUM 100 MILLIGRAM(S): 100 INJECTION SUBCUTANEOUS at 17:59

## 2024-06-18 RX ADMIN — DILTIAZEM HYDROCHLORIDE 120 MILLIGRAM(S): 240 CAPSULE, EXTENDED RELEASE ORAL at 14:33

## 2024-06-18 RX ADMIN — MUPIROCIN 1 APPLICATION(S): 20 OINTMENT TOPICAL at 18:00

## 2024-06-18 RX ADMIN — Medication 100 MILLIGRAM(S): at 18:00

## 2024-06-18 RX ADMIN — Medication 1 SPRAY(S): at 05:25

## 2024-06-18 RX ADMIN — Medication 20 MILLIGRAM(S): at 12:35

## 2024-06-18 RX ADMIN — Medication 1000 MILLIGRAM(S): at 00:12

## 2024-06-18 RX ADMIN — ENOXAPARIN SODIUM 100 MILLIGRAM(S): 100 INJECTION SUBCUTANEOUS at 05:25

## 2024-06-18 RX ADMIN — ATORVASTATIN CALCIUM 40 MILLIGRAM(S): 20 TABLET, FILM COATED ORAL at 21:18

## 2024-06-18 RX ADMIN — OXYCODONE HYDROCHLORIDE 10 MILLIGRAM(S): 100 SOLUTION ORAL at 21:18

## 2024-06-18 RX ADMIN — POTASSIUM PHOSPHATE, MONOBASIC POTASSIUM PHOSPHATE, DIBASIC INJECTION, 62.5 MILLIMOLE(S): 236; 224 SOLUTION, CONCENTRATE INTRAVENOUS at 07:52

## 2024-06-18 RX ADMIN — Medication 1 APPLICATION(S): at 14:33

## 2024-06-18 RX ADMIN — MORPHINE SULFATE 2 MILLIGRAM(S): 100 TABLET, EXTENDED RELEASE ORAL at 09:40

## 2024-06-18 RX ADMIN — VANCOMYCIN HYDROCHLORIDE 166.67 MILLIGRAM(S): KIT at 10:28

## 2024-06-18 RX ADMIN — OXYCODONE HYDROCHLORIDE 10 MILLIGRAM(S): 100 SOLUTION ORAL at 15:10

## 2024-06-18 RX ADMIN — OXYCODONE HYDROCHLORIDE 10 MILLIGRAM(S): 100 SOLUTION ORAL at 22:05

## 2024-06-18 RX ADMIN — DILTIAZEM HYDROCHLORIDE 120 MILLIGRAM(S): 240 CAPSULE, EXTENDED RELEASE ORAL at 21:19

## 2024-06-18 RX ADMIN — DILTIAZEM HYDROCHLORIDE 120 MILLIGRAM(S): 240 CAPSULE, EXTENDED RELEASE ORAL at 05:25

## 2024-06-18 RX ADMIN — MORPHINE SULFATE 2 MILLIGRAM(S): 100 TABLET, EXTENDED RELEASE ORAL at 10:00

## 2024-06-18 RX ADMIN — Medication 50 MILLIGRAM(S): at 18:00

## 2024-06-18 RX ADMIN — HYDROXYZINE PAMOATE 25 MILLIGRAM(S): 50 CAPSULE ORAL at 04:07

## 2024-06-18 RX ADMIN — Medication 1 APPLICATION(S): at 18:22

## 2024-06-18 NOTE — SWALLOW BEDSIDE ASSESSMENT ADULT - CONSISTENCIES ADMINISTERED
x3 trials; x1 trial/soft & bite-sized/regular solid x2 trials/minced & moist 4oz; 3oz/thin liquid/mildly thick 2oz/pureed

## 2024-06-18 NOTE — PROGRESS NOTE ADULT - CRITICAL CARE ATTENDING COMMENT
Upon my evaluation, this patient is at high risk for imminent or life threatening deterioration due to cardiac arrest, resp failure and other active medical issues which require my direct attention, intervention, and personal management.  I have personally spent >30 minutes  of critical care time exclusive of time spent on separate billing procedures. This includes review of laboratory data, radiology results, discussion with primary team\patient, and monitoring for potential decompensation. Interventions were performed as documented above.

## 2024-06-18 NOTE — PROGRESS NOTE ADULT - ASSESSMENT
OBI: ATN  Hypokalemia  Metabolic acidosis: OBI, Lactic acidosis  Diabetes  Atrial fibrillation  s/p Cardiac arrest    Improved renal indies. To continue current meds. Monitor BP trend. Titrate BP meds as needed.    Monitor blood sugar levels. Insulin coverage as needed. Avoid nephrotoxic meds as possible. Will follow electrolytes and renal function trend.

## 2024-06-18 NOTE — PHYSICAL THERAPY INITIAL EVALUATION ADULT - PERTINENT HX OF CURRENT PROBLEM, REHAB EVAL
Claude Villareal is a 54 YO M with past medical history of  AF on Eliquis, indwelling Aguilera, CAD s/p stents, CVA, DMT 2, Hypertension, osteomyelitis s/p debridement, PE, transferred from [facility] for difficulty in breathing.  His recent admission was for osteomyelitis and discharged on IV Vancomycin. On ED arrival he is unresponsive, apneic, no palpable pulse or blood pressure, EKG rhythm HR < 20/min W/O P waves. Patient intubated, Code ACLS activated, IV epinephrine, IV atropine administered, pulse became palpable, still Hypotensive, IV levophed administered. Admitted to ICU consulted,  Patient woke up and agitated try to pull out ETT,  IV propofol started

## 2024-06-18 NOTE — PROGRESS NOTE ADULT - PROBLEM SELECTOR PLAN 1
S/P Intubated and admitted to ICU  etiology unclear  S/P vent support per intensivist  now extubated  transfer to floor

## 2024-06-18 NOTE — PROGRESS NOTE ADULT - SUBJECTIVE AND OBJECTIVE BOX
Patient is a 53y old  Male who presents with a chief complaint of Acute respiratory failure with hypoxia (17 Jun 2024 10:29)  Patient seen in follow up for OBI.        PAST MEDICAL HISTORY:  No pertinent past medical history    Diabetes    No pertinent past medical history    Diabetes mellitus with no complication    Afib    Hypertension    BPH (benign prostatic hyperplasia)    Perforated gastric ulcer    Pulmonary embolism    History of non-ST elevation myocardial infarction (NSTEMI)    Osteomyelitis    CAD S/P percutaneous coronary angioplasty    Cerebrovascular accident      MEDICATIONS  (STANDING):  benzocaine 20% Spray 1 Spray(s) Topical every 6 hours  calamine/zinc oxide Lotion 1 Application(s) Topical two times a day  chlorhexidine 2% Cloths 1 Application(s) Topical daily  dextrose 10% Bolus 125 milliLiter(s) IV Bolus once  dextrose 5%. 1000 milliLiter(s) (50 mL/Hr) IV Continuous <Continuous>  dextrose 5%. 1000 milliLiter(s) (100 mL/Hr) IV Continuous <Continuous>  dextrose 50% Injectable 25 Gram(s) IV Push once  dextrose 50% Injectable 12.5 Gram(s) IV Push once  diltiazem    Tablet 120 milliGRAM(s) Oral every 8 hours  enoxaparin Injectable 100 milliGRAM(s) SubCutaneous every 12 hours  famotidine Injectable 20 milliGRAM(s) IV Push daily  glucagon  Injectable 1 milliGRAM(s) IntraMuscular once  insulin lispro (ADMELOG) corrective regimen sliding scale   SubCutaneous every 6 hours  metoprolol tartrate 50 milliGRAM(s) Oral every 6 hours  mupirocin 2% Nasal 1 Application(s) Both Nostrils two times a day  sodium chloride 0.45%. 1000 milliLiter(s) (75 mL/Hr) IV Continuous <Continuous>  vancomycin  IVPB      vancomycin  IVPB 1250 milliGRAM(s) IV Intermittent every 12 hours    MEDICATIONS  (PRN):  acetaminophen   Oral Liquid .. 650 milliGRAM(s) Oral every 6 hours PRN Temp greater or equal to 38C (100.4F)  dextrose Oral Gel 15 Gram(s) Oral once PRN Blood Glucose LESS THAN 70 milliGRAM(s)/deciliter  metoprolol tartrate Injectable 5 milliGRAM(s) IV Push every 6 hours PRN HR >140  sodium chloride 0.9% lock flush 10 milliLiter(s) IV Push every 1 hour PRN Pre/post blood products, medications, blood draw, and to maintain line patency    T(C): 36.9 (06-18-24 @ 08:06), Max: 38 (06-16-24 @ 18:00)  HR: 121 (06-18-24 @ 10:00) (87 - 163)  BP: 114/77 (06-18-24 @ 10:00) (97/69 - 143/77)  RR: 23 (06-18-24 @ 10:00)  SpO2: 97% (06-18-24 @ 10:00)  Wt(kg): --  I&O's Detail    17 Jun 2024 07:01  -  18 Jun 2024 07:00  --------------------------------------------------------  IN:    IV PiggyBack: 250 mL    IV PiggyBack: 607.5 mL    IV PiggyBack: 300 mL    IV PiggyBack: 100 mL    Lactated Ringers: 300 mL    sodium chloride 0.45%: 1275 mL  Total IN: 2832.5 mL    OUT:    Indwelling Catheter - Urethral (mL): 1900 mL    Rectal Tube (mL): 80 mL  Total OUT: 1980 mL    Total NET: 852.5 mL              PHYSICAL EXAM:  General: No distress  Respiratory: b/l air entry  Cardiovascular: S1 S2  Gastrointestinal: soft  Extremities:  Left TMA, Chronic skin changes                   LABORATORY:                        10.8   7.12  )-----------( 165      ( 18 Jun 2024 05:50 )             35.1     06-18    146<H>  |  115<H>  |  15  ----------------------------<  119<H>  3.6   |  26  |  1.20    Ca    8.1<L>      18 Jun 2024 05:50  Phos  2.2     06-18  Mg     1.9     06-18    TPro  5.7<L>  /  Alb  2.2<L>  /  TBili  0.7  /  DBili  x   /  AST  51<H>  /  ALT  236<H>  /  AlkPhos  123<H>  06-18    Sodium: 146 mmol/L (06-18 @ 05:50)  Sodium: 148 mmol/L (06-17 @ 19:10)  Sodium: 149 mmol/L (06-17 @ 05:52)    Potassium: 3.6 mmol/L (06-18 @ 05:50)  Potassium: 3.7 mmol/L (06-17 @ 19:10)  Potassium: 3.2 mmol/L (06-17 @ 05:52)    Hemoglobin: 10.8 g/dL (06-18 @ 05:50)  Hemoglobin: 10.9 g/dL (06-17 @ 05:52)  Hemoglobin: 10.9 g/dL (06-16 @ 05:38)    Creatinine, Serum 1.20 (06-18 @ 05:50)  Creatinine, Serum 1.20 (06-17 @ 19:10)  Creatinine, Serum 1.40 (06-17 @ 05:52)  Creatinine, Serum 1.90 (06-16 @ 05:38)        LIVER FUNCTIONS - ( 18 Jun 2024 05:50 )  Alb: 2.2 g/dL / Pro: 5.7 g/dL / ALK PHOS: 123 U/L / ALT: 236 U/L / AST: 51 U/L / GGT: x           Urinalysis Basic - ( 18 Jun 2024 05:50 )    Color: x / Appearance: x / SG: x / pH: x  Gluc: 119 mg/dL / Ketone: x  / Bili: x / Urobili: x   Blood: x / Protein: x / Nitrite: x   Leuk Esterase: x / RBC: x / WBC x   Sq Epi: x / Non Sq Epi: x / Bacteria: x

## 2024-06-18 NOTE — PROGRESS NOTE ADULT - SUBJECTIVE AND OBJECTIVE BOX
[facility] - Brittany Lutz, Reynaldo Sweeney, Francesco France  Office Number:  [phone]    Patient resting comfortably in bed in NAD.  Laying flat with no respiratory distress.  No complaints of chest pain, dyspnea, palpitations, PND, or orthopnea.    ROS: negative unless otherwise mentioned.    Telemetry: AF, with RVR up to 6am this morning    MEDICATIONS  (STANDING):  benzocaine 20% Spray 1 Spray(s) Topical every 6 hours  calamine/zinc oxide Lotion 1 Application(s) Topical two times a day  chlorhexidine 2% Cloths 1 Application(s) Topical daily  dextrose 10% Bolus 125 milliLiter(s) IV Bolus once  dextrose 5%. 1000 milliLiter(s) (50 mL/Hr) IV Continuous <Continuous>  dextrose 5%. 1000 milliLiter(s) (100 mL/Hr) IV Continuous <Continuous>  dextrose 50% Injectable 25 Gram(s) IV Push once  dextrose 50% Injectable 12.5 Gram(s) IV Push once  diltiazem    Tablet 120 milliGRAM(s) Oral every 8 hours  enoxaparin Injectable 100 milliGRAM(s) SubCutaneous every 12 hours  famotidine Injectable 20 milliGRAM(s) IV Push daily  glucagon  Injectable 1 milliGRAM(s) IntraMuscular once  insulin lispro (ADMELOG) corrective regimen sliding scale   SubCutaneous every 6 hours  metoprolol tartrate 50 milliGRAM(s) Oral every 6 hours  mupirocin 2% Nasal 1 Application(s) Both Nostrils two times a day  sodium chloride 0.45%. 1000 milliLiter(s) (75 mL/Hr) IV Continuous <Continuous>  vancomycin  IVPB 1250 milliGRAM(s) IV Intermittent every 12 hours  vancomycin  IVPB        MEDICATIONS  (PRN):  acetaminophen   Oral Liquid .. 650 milliGRAM(s) Oral every 6 hours PRN Temp greater or equal to 38C (100.4F)  dextrose Oral Gel 15 Gram(s) Oral once PRN Blood Glucose LESS THAN 70 milliGRAM(s)/deciliter  metoprolol tartrate Injectable 5 milliGRAM(s) IV Push every 6 hours PRN HR >140  sodium chloride 0.9% lock flush 10 milliLiter(s) IV Push every 1 hour PRN Pre/post blood products, medications, blood draw, and to maintain line patency      Allergies    ertapenem (Blisters; Rash)  fish (Hives)    Intolerances        Vital Signs Last 24 Hrs  T(C): 36.9 (18 Jun 2024 08:06), Max: 37.4 (17 Jun 2024 16:35)  T(F): 98.4 (18 Jun 2024 08:06), Max: 99.3 (17 Jun 2024 16:35)  HR: 121 (18 Jun 2024 10:00) (87 - 160)  BP: 114/77 (18 Jun 2024 10:00) (97/69 - 142/82)  BP(mean): 90 (18 Jun 2024 10:00) (79 - 107)  RR: 23 (18 Jun 2024 10:00) (15 - 30)  SpO2: 97% (18 Jun 2024 10:00) (95% - 100%)    Parameters below as of 18 Jun 2024 10:00  Patient On (Oxygen Delivery Method): room air        I&O's Summary    17 Jun 2024 07:01  -  18 Jun 2024 07:00  --------------------------------------------------------  IN: 2832.5 mL / OUT: 1980 mL / NET: 852.5 mL        ON EXAM:    General: NAD, awake and alert, oriented x 3  HEENT: Mucous membranes are moist, anicteric  Lungs: Non-labored, breath sounds are clear bilaterally, No wheezing, rales or rhonchi  Cardiovascular: Regular, S1 and S2, no murmurs, rubs, or gallops  Gastrointestinal: Bowel Sounds present, soft, nontender.   Lymph: No peripheral edema. No lymphadenopathy.  Skin: No rashes or ulcers  Psych:  Mood & affect appropriate    LABS: All Labs Reviewed:                        10.8   7.12  )-----------( 165      ( 18 Jun 2024 05:50 )             35.1                         10.9   8.37  )-----------( 162      ( 17 Jun 2024 05:52 )             35.2                         10.9   9.49  )-----------( 153      ( 16 Jun 2024 05:38 )             33.3     18 Jun 2024 05:50    146    |  115    |  15     ----------------------------<  119    3.6     |  26     |  1.20   17 Jun 2024 19:10    148    |  116    |  18     ----------------------------<  131    3.7     |  23     |  1.20   17 Jun 2024 05:52    149    |  115    |  23     ----------------------------<  109    3.2     |  29     |  1.40     Ca    8.1        18 Jun 2024 05:50  Ca    8.1        17 Jun 2024 19:10  Ca    8.8        17 Jun 2024 05:52  Phos  2.2       18 Jun 2024 05:50  Phos  2.8       17 Jun 2024 19:10  Phos  0.9       17 Jun 2024 05:52  Mg     1.9       18 Jun 2024 05:50  Mg     2.0       17 Jun 2024 19:10  Mg     2.1       17 Jun 2024 05:52    TPro  5.7    /  Alb  2.2    /  TBili  0.7    /  DBili  x      /  AST  51     /  ALT  236    /  AlkPhos  123    18 Jun 2024 05:50  TPro  5.6    /  Alb  2.1    /  TBili  0.7    /  DBili  x      /  AST  79     /  ALT  351    /  AlkPhos  128    17 Jun 2024 05:52  TPro  5.4    /  Alb  1.9    /  TBili  0.6    /  DBili  x      /  AST  191    /  ALT  497    /  AlkPhos  109    16 Jun 2024 05:38    PT/INR - ( 18 Jun 2024 05:50 )   PT: 12.3 sec;   INR: 1.05 ratio         PTT - ( 18 Jun 2024 05:50 )  PTT:39.3 sec      Blood Culture: Organism --  Gram Stain Blood -- Gram Stain --  Specimen Source Catheterized  Culture-Blood --    Organism Methicillin resistant Staphylococcus aureus  Gram Stain Blood -- Gram Stain --  Specimen Source .Surgical Swab RIGHT HEEL WOUND  Culture-Blood --    Organism --  Gram Stain Blood -- Gram Stain --  Specimen Source .Blood Blood-Peripheral  Culture-Blood --    Organism --  Gram Stain Blood -- Gram Stain --  Specimen Source .Blood Blood-Peripheral  Culture-Blood --

## 2024-06-18 NOTE — PROGRESS NOTE ADULT - ASSESSMENT
53 year old male with a PMH of AF on Eliquis, indwelling jacques, CAD s/p stents, CVA, DM, HTN, hx PE, recent hospitalization discharged yesterday after an admission for R heel osteomyelitis w/ debridement who presented to the ED from [facility] for difficulty breathing.    Problem list:  Asystolic cardiac arrest  Acute hypoxic respiratory failure  Shock (septic v cardiogenic)   Lactic/respiratory acidosis  OBI  Transaminitis  Hypomagnesemia  Afib  Elevated INR    Neuro: Acute metabolic encephalopathy 2/2 ?shock vs. anoxic injury   - s/p ketamine, precedex, propofol   - alert and oriented. conversing properly.  - Serial neurologic assessments    CV: Cardiogenic shock 2/2 bradycardia vs distributive shock 2/2 anaphylactic reaction to ertapenem.   - Per medication review w/ [facility] eMAR, patient received total cardizem ER 720mg and Toprol XL 200mg in last 24 hrs  --> ?cardiogenic shock 2/2 bradycardia  - Calcium gluconate to stabilize cardiac membrane and BB toxicity  -?distributive shock 2/2 anaphylaxis to ertapenem  - EKG on arrival with slow afib but no signs of ischemia.   - No longer in shock; phenylephrine discontinued   - Improvement in HR, remains in afib; off amio gtt.  - Patient HR improved from 170s to 90s-120s. c/w metoprolol tartrate 50mg q6h PO (Pt on toprol 100mg qd at home) and cardizem 120mg q8h PO (Pt on cardizem 360 qd at home) w/ hold parameters  - Continue lopressor 5 IV q6 changed to PRN orders w/ hold parameters. SBP >140   - r/p CXR on 6/18 no acute changes from CXR from 6/14 on wet read.  - Coag panel now within normal limits; on FD Lovenox for AC  - Trops peaked, downtrending   - Echo from March w/ normal LVEF.    - TTE 6/14: Left ventricular systolic function is normal with an ejection fraction visually estimated at 55 to 60 %. There are no regional wall motion abnormalities seen. Moderate to severe left ventricular hypertrophy. Normal right ventricular cavity size, with normal wall thickness, and normal systolic function. The left atrium is severely dilated. The right atrium is severely dilated.  - Cardiology following    Pulm: Acute hypoxic respiratory failure /resp acidosis.   - C/w full vent support.   - Metabolic acidosis improved on 6/16.   - SBTs as able.     GI:   - multiple dilated bowel loops on CT  - CT AP: Numerous mildly distended gas-filled and fluid-filled small bowel loops with air-fluid levels and no transition point involving the entire small bowel is most suggestive of ileus, ascites or pneumoperitoneum. Emphysema.  - NGT to suction, now minimal drainage   - Tolerating trickle feeds; initiate regular tube feeds   - patient passed dysphagia screening  - S/S evaluation - recommend minced and moist solids with thin liquids.  - Patient continuing to have diarrhea. d/c naloxegrol, senna, miralax. concerns for cdif low at this time. now off iso precautions.  - c/w PPI for GI prophylaxis.   - Transaminitis improving/stable     Renal: OBI likely 2/2 ATN from cardiac arrest vs prerenal   - Cr remains elevated, now downtrending   - Lactate downtrending, now wnl   - Profound lactic acidosis likely from cardiac arrest   - S/p 1L LR bolus , s/p LR IVF  - c/w 1/2 NS IVF for mild hypernatremia  - Replete lytes PRN  K>4, Mg>2.     ID: ongoing treatment for OM s/p R heel debridement during prior hospitalization  - Was receiving ertapenem and vancomycin, however as per prior ID notes ertapenem was stopped due to development of rash.    - Wound/tissue cultures from prior admission with MRSA, ESBL E.coli, E faecalis.  \  - now on vancomycin, to continue 1250mg IVPB BID  - BCx NGTD 48hr  - UA large LE, neg nitrites, WBC TNTC, few bacteria; UCx pending   - Monitor patients clinical presentation, plan to re-check UA if any new febrile episodes   - Continue mupirocin   - pt with multiple bouts of liquid, nonbloody diarrhea - c-diff/GI PCR ordered,  f/u results  - ID following    Endocrine: Hx of DM  - Monitor FS q6h   - FS stable on MDISS  - TSH elevated. T3/T4 wnl; likely 2/2 recovering from sick euthyroid syndrome    Heme:  - On Eliquis as an outpatient.   - INR now wnl; continue FD lovenox     DISPO: FULL CODE  53 year old male with a PMH of AF on Eliquis, indwelling jacques, CAD s/p stents, CVA, DM, HTN, hx PE, recent hospitalization discharged yesterday after an admission for R heel osteomyelitis w/ debridement who presented to the ED from [facility] for difficulty breathing.    Problem list:  Asystolic cardiac arrest  Acute hypoxic respiratory failure  Shock (septic v cardiogenic)   Lactic/respiratory acidosis  OBI  Transaminitis  Hypomagnesemia  Afib  Elevated INR    Neuro: Acute metabolic encephalopathy 2/2 ?shock vs. anoxic injury   - s/p ketamine, precedex, propofol   - alert and oriented. conversing properly.  - Serial neurologic assessments    CV: Cardiogenic shock 2/2 bradycardia vs distributive shock 2/2 anaphylactic reaction to ertapenem.   - Per medication review w/ [facility] eMAR, patient received total cardizem ER 720mg and Toprol XL 200mg in last 24 hrs  --> ?cardiogenic shock 2/2 bradycardia  - Calcium gluconate to stabilize cardiac membrane and BB toxicity  -?distributive shock 2/2 anaphylaxis to ertapenem  - EKG on arrival with slow afib but no signs of ischemia.   - No longer in shock; phenylephrine discontinued   - Improvement in HR, remains in afib; off amio gtt.  - Patient HR improved from 170s to 90s-120s. c/w metoprolol tartrate 50mg q6h PO (Pt on toprol 100mg qd at home) and cardizem 120mg q8h PO (Pt on cardizem 360 qd at home) w/ hold parameters  - Continue lopressor 5 IV q6 changed to PRN orders w/ hold parameters. SBP >140   - Coag panel now within normal limits; on FD Lovenox for AC  - Trops peaked, downtrending   - Echo from March w/ normal LVEF.    - TTE 6/14: Left ventricular systolic function is normal with an ejection fraction visually estimated at 55 to 60 %. There are no regional wall motion abnormalities seen. Moderate to severe left ventricular hypertrophy. Normal right ventricular cavity size, with normal wall thickness, and normal systolic function. The left atrium is severely dilated. The right atrium is severely dilated.  - Cardiology following    Pulm: Acute hypoxic respiratory failure /resp acidosis.   - C/w full vent support.   - Metabolic acidosis improved on 6/16.   - SBTs as able.   - r/p CXR on 6/18 no acute changes from CXR from 6/14 on wet read, pending official read.    GI:   - multiple dilated bowel loops on CT  - CT AP: Numerous mildly distended gas-filled and fluid-filled small bowel loops with air-fluid levels and no transition point involving the entire small bowel is most suggestive of ileus, ascites or pneumoperitoneum. Emphysema.  - NGT to suction, now minimal drainage   - Tolerating trickle feeds; initiate regular tube feeds   - patient passed dysphagia screening  - S/S evaluation - recommend minced and moist solids with thin liquids.  - Patient continuing to have diarrhea. d/c naloxegrol, senna, miralax. concerns for cdif low at this time. now off iso precautions.  - c/w PPI for GI prophylaxis.   - Transaminitis improving/stable     Renal: OBI likely 2/2 ATN from cardiac arrest vs prerenal   - Cr remains elevated, now downtrending   - Lactate downtrending, now wnl   - Profound lactic acidosis likely from cardiac arrest   - S/p 1L LR bolus , s/p LR IVF  - c/w 1/2 NS IVF for mild hypernatremia  - Replete lytes PRN  K>4, Mg>2.     ID: ongoing treatment for OM s/p R heel debridement during prior hospitalization  - Was receiving ertapenem and vancomycin, however as per prior ID notes ertapenem was stopped due to development of rash.    - Wound/tissue cultures from prior admission with MRSA, ESBL E.coli, E faecalis.  \  - now on vancomycin, to continue 1250mg IVPB BID  - BCx NGTD 48hr  - UA large LE, neg nitrites, WBC TNTC, few bacteria; UCx pending   - Monitor patients clinical presentation, plan to re-check UA if any new febrile episodes   - Continue mupirocin   - pt with multiple bouts of liquid, nonbloody diarrhea - c-diff/GI PCR ordered,  f/u results  - ID following    Endocrine: Hx of DM  - Monitor FS q6h   - FS stable on MDISS  - TSH elevated. T3/T4 wnl; likely 2/2 recovering from sick euthyroid syndrome    Heme:  - On Eliquis as an outpatient.   - INR now wnl; continue FD lovenox     DISPO: FULL CODE

## 2024-06-18 NOTE — PROGRESS NOTE ADULT - ASSESSMENT
54 YO M with past medical history of AFib (on Eliquis), indwelling Aguilera, cerebral aneurysm, CAD (s/p stents), CVA, T2DM, HTN, OM (s/p debridement), PE, perforated gastric ulcer s/p ometnopexy 2019 with Dr. Mejia, transferred from [facility] for difficulty in breathing.  His recent admission was for osteomyelitis and discharged on IV Vancomycin, admitted s/p cardiac arrest x2 w/ AHRF admitted to the ICU currently intubated, sedated, on levophed and epinephrine, NGT placed in the ED. Cardiology consulted for cardiac arrest and afib.     - No clear evidence of acute ischemia  - resume asa when able for known CAD s/p MICHELLE  - no statin given lft abn, will resume when able    - arrest and johnny issues likely iatrogenic from double dosing of avn blockers  - hr increased, prob aggravated by pressors, and meds wearing off  - given was still pressor dependent, rate control was attempted with dig and amio  - 6/15 pressors minimized and on primarily piero, and started on metop iv for rate  - Now on Lopressor 50mg q6H and diltiazem 120mg Q8H  - Rates now controlled this AM  - On Lovenox BID for AC  - normal ef and mod-sev mr on echo, no need to repeat now     - atn with improving creat  - replete phosphorus  - keep i=o     52 YO M with past medical history of AFib (on Eliquis), indwelling Aguilera, cerebral aneurysm, CAD (s/p stents), CVA, T2DM, HTN, OM (s/p debridement), PE, perforated gastric ulcer s/p ometnopexy 2019 with Dr. Mejia, transferred from [facility] for difficulty in breathing.  His recent admission was for osteomyelitis and discharged on IV Vancomycin, admitted s/p cardiac arrest x2 w/ AHRF admitted to the ICU currently intubated, sedated, on levophed and epinephrine, NGT placed in the ED. Cardiology consulted for cardiac arrest and afib.     - No clear evidence of acute ischemia  - resume asa when able for known CAD s/p MICHELLE  - no statin given lft abn, will resume when able    - arrest and johnny issues likely iatrogenic from double dosing of avn blockers  - hr increased, prob aggravated by pressors, and meds wearing off  - given was still pressor dependent, rate control was attempted with dig and amio  - 6/15 pressors minimized and on primarily piero, and started on metop iv for rate  - Now on Lopressor 50mg q6H and diltiazem 120mg Q8H  - Rates now controlled this AM  - On Lovenox BID for AC  - normal ef and mod-sev mr on echo, no need to repeat now     - With increasing secretions this AM  - CXR on 6/14 with persistent mild left base infiltrate  - Repeat CXR is pending this AM  - Currently on Vanc only. Would consider some gram negative coverage given his secretions?    - atn with improving creat  - replete phosphorus  - keep i=o

## 2024-06-18 NOTE — PROVIDER CONTACT NOTE (CRITICAL VALUE NOTIFICATION) - SITUATION
Final result surgical swab - positive for numerous citobacter freundii complex, moderate MRSA, few pseudomonas, stutzeri, few bacteroides caccae rare bacteroides vulgatus group Final result surgical swab - positive for numerous citrobacter freundii complex, moderate MRSA, few pseudomonas, stutzeri, few bacteroides caccae rare bacteroides vulgarus group

## 2024-06-18 NOTE — PROGRESS NOTE ADULT - SUBJECTIVE AND OBJECTIVE BOX
Interval Events:     Review of Systems:  Constitutional: No fever, chills, fatigue  Neuro: No headache, numbness, weakness  Resp: No cough, wheezing, shortness of breath  CVS: No chest pain, palpitations, leg swelling  GI: No abdominal pain, nausea, vomiting, diarrhea   : No dysuria, frequency, incontinence  Skin: No itching, burning, rashes, or lesions   Msk: No joint pain or swelling  Psych: No depression, anxiety, mood swings    ICU Vital Signs Last 24 Hrs  T(C): 36.9 (18 Jun 2024 08:06), Max: 37.4 (17 Jun 2024 16:35)  T(F): 98.4 (18 Jun 2024 08:06), Max: 99.3 (17 Jun 2024 16:35)  HR: 87 (18 Jun 2024 07:00) (87 - 163)  BP: 110/63 (18 Jun 2024 07:00) (97/69 - 142/82)  BP(mean): 81 (18 Jun 2024 07:00) (79 - 107)  ABP: --  ABP(mean): --  RR: 22 (18 Jun 2024 07:00) (15 - 30)  SpO2: 95% (18 Jun 2024 07:00) (94% - 100%)    O2 Parameters below as of 18 Jun 2024 02:00  Patient On (Oxygen Delivery Method): room air                CAPILLARY BLOOD GLUCOSE      POCT Blood Glucose.: 99 mg/dL (18 Jun 2024 05:25)      I&O's Summary    17 Jun 2024 07:01  -  18 Jun 2024 07:00  --------------------------------------------------------  IN: 2832.5 mL / OUT: 1980 mL / NET: 852.5 mL        Physical Exam:   Gen:   Neuro:   HEENT:  Resp:  CVS:  Abd:  Ext:  Skin:     Meds:    diltiazem    Tablet Oral  metoprolol tartrate Oral  metoprolol tartrate Injectable IV Push PRN    dextrose 50% Injectable IV Push  dextrose 50% Injectable IV Push  dextrose Oral Gel Oral PRN  glucagon  Injectable IntraMuscular  insulin lispro (ADMELOG) corrective regimen sliding scale SubCutaneous      acetaminophen   Oral Liquid .. Oral PRN      enoxaparin Injectable SubCutaneous    famotidine Injectable IV Push      dextrose 10% Bolus IV Bolus  dextrose 5%. IV Continuous  dextrose 5%. IV Continuous  sodium chloride 0.45%. IV Continuous  sodium chloride 0.9% lock flush IV Push PRN      benzocaine 20% Spray Topical  calamine/zinc oxide Lotion Topical  chlorhexidine 2% Cloths Topical  mupirocin 2% Nasal Both Nostrils                              10.8   7.12  )-----------( 165      ( 18 Jun 2024 05:50 )             35.1       06-18    146<H>  |  115<H>  |  15  ----------------------------<  119<H>  3.6   |  26  |  1.20    Ca    8.1<L>      18 Jun 2024 05:50  Phos  2.2     06-18  Mg     1.9     06-18    TPro  5.7<L>  /  Alb  2.2<L>  /  TBili  0.7  /  DBili  x   /  AST  51<H>  /  ALT  236<H>  /  AlkPhos  123<H>  06-18          PT/INR - ( 18 Jun 2024 05:50 )   PT: 12.3 sec;   INR: 1.05 ratio         PTT - ( 18 Jun 2024 05:50 )  PTT:39.3 sec  Urinalysis Basic - ( 18 Jun 2024 05:50 )    Color: x / Appearance: x / SG: x / pH: x  Gluc: 119 mg/dL / Ketone: x  / Bili: x / Urobili: x   Blood: x / Protein: x / Nitrite: x   Leuk Esterase: x / RBC: x / WBC x   Sq Epi: x / Non Sq Epi: x / Bacteria: x      Catheterized   50,000 - 99,000 CFU/mL Candida albicans "Susceptibilities not performed" -- 06-14 @ 16:50  .Surgical Swab RIGHT HEEL WOUND   Numerous Citrobacter freundii complex  Moderate Methicillin Resistant Staphylococcus aureus  Few Pseudomonas stutzeri  Few Bacteroides caccae "Susceptibilities not performed"  Rare Bacteroides vulgatus group "Susceptibilities not performed" -- 06-14 @ 02:47  .Blood Blood-Peripheral   No growth at 4 days -- 06-13 @ 22:55  .Blood Blood-Peripheral   No growth at 4 days -- 06-13 @ 22:50      Rapid RVP Result: NotDetec (06-14 @ 17:00)          Radiology:    Bedside Ultrasound:    Tubes/Lines:      GLOBAL ISSUE/BEST PRACTICE:  Analgesia:  Sedation:  HOB elevation: Y  Stress ulcer prophylaxis:  VTE prophylaxis:  Glycemic control:  Nutrition:    CODE STATUS:        Interval Events: Patient seen at bedside this morning. Per RN, patient's had 1 light BM this morning, improving in consistency. Patient's HR 90-130s overnight. Pt seen at bedside this morning, states that he has had R heel pain 8/10 in intensity. On IV vanc, amenable to trying morphine to ease discomfort. Otherwise, denies fever, n/v, CP, SOB, abd pain at this time.    Review of Systems:  Constitutional: No fever, chills, fatigue  Neuro: No headache, numbness, weakness  Resp: No cough, wheezing, shortness of breath  CVS: No chest pain, palpitations,    GI: No abdominal pain, nausea, vomiting, diarrhea   : No dysuria, frequency, incontinence  Skin: No itching, burning  Msk: +RLE pain      ICU Vital Signs Last 24 Hrs  T(C): 36.9 (18 Jun 2024 08:06), Max: 37.4 (17 Jun 2024 16:35)  T(F): 98.4 (18 Jun 2024 08:06), Max: 99.3 (17 Jun 2024 16:35)  HR: 87 (18 Jun 2024 07:00) (87 - 163)  BP: 110/63 (18 Jun 2024 07:00) (97/69 - 142/82)  BP(mean): 81 (18 Jun 2024 07:00) (79 - 107)  ABP: --  ABP(mean): --  RR: 22 (18 Jun 2024 07:00) (15 - 30)  SpO2: 95% (18 Jun 2024 07:00) (94% - 100%)    O2 Parameters below as of 18 Jun 2024 02:00  Patient On (Oxygen Delivery Method): room air                CAPILLARY BLOOD GLUCOSE      POCT Blood Glucose.: 99 mg/dL (18 Jun 2024 05:25)      I&O's Summary    17 Jun 2024 07:01  -  18 Jun 2024 07:00  --------------------------------------------------------  IN: 2832.5 mL / OUT: 1980 mL / NET: 852.5 mL    Physical Exam:   General: Ill appearing male, on IVF, on NC  HEENT: Pupils equal, reactive to light.  Symmetric.  PULM: Clear to auscultation bilaterally  CVS: +tachycardia, improved. irregularly irregular   ABD: mildly Distended abdomen   EXT: Wounds to the b/l LE (LLE transmetatarsal amputation)   SKIN: b/l RUE Erythematous rash appreciated to the trunk and b/l UE extremities   NEURO: PERRL, + spontaneous movements     Meds:  vancomycin  IVPB IV Intermittent    diltiazem    Tablet Oral  metoprolol tartrate Oral  metoprolol tartrate Injectable IV Push PRN    dextrose 50% Injectable IV Push  dextrose 50% Injectable IV Push  dextrose Oral Gel Oral PRN  glucagon  Injectable IntraMuscular  insulin lispro (ADMELOG) corrective regimen sliding scale SubCutaneous      acetaminophen   Oral Liquid .. Oral PRN      enoxaparin Injectable SubCutaneous    famotidine Injectable IV Push      dextrose 10% Bolus IV Bolus  dextrose 5%. IV Continuous  dextrose 5%. IV Continuous  potassium chloride   Powder Oral  sodium chloride 0.45%. IV Continuous  sodium chloride 0.9% lock flush IV Push PRN      benzocaine 20% Spray Topical  chlorhexidine 2% Cloths Topical  mupirocin 2% Nasal Both Nostrils                              10.9   8.37  )-----------( 162      ( 17 Jun 2024 05:52 )             35.2       06-17    149<H>  |  115<H>  |  23  ----------------------------<  109<H>  3.2<L>   |  29  |  1.40<H>    Ca    8.8      17 Jun 2024 05:52  Phos  0.9     06-17  Mg     2.1     06-17    TPro  5.6<L>  /  Alb  2.1<L>  /  TBili  0.7  /  DBili  x   /  AST  79<H>  /  ALT  351<H>  /  AlkPhos  128<H>  06-17          PT/INR - ( 17 Jun 2024 05:52 )   PT: 12.7 sec;   INR: 1.09 ratio         PTT - ( 17 Jun 2024 05:52 )  PTT:34.5 sec  Urinalysis Basic - ( 17 Jun 2024 05:52 )    Color: x / Appearance: x / SG: x / pH: x  Gluc: 109 mg/dL / Ketone: x  / Bili: x / Urobili: x   Blood: x / Protein: x / Nitrite: x   Leuk Esterase: x / RBC: x / WBC x   Sq Epi: x / Non Sq Epi: x / Bacteria: x      .Surgical Swab RIGHT HEEL WOUND   Numerous Citrobacter freundii complex  Moderate Methicillin Resistant Staphylococcus aureus  Few Pseudomonas stutzeri  Few Bacteroides caccae "Susceptibilities not performed"  Rare Bacteroides vulgatus group "Susceptibilities not performed" -- 06-14 @ 02:47  .Blood Blood-Peripheral   No growth at 72 Hours -- 06-13 @ 22:55  .Blood Blood-Peripheral   No growth at 72 Hours -- 06-13 @ 22:50      Rapid RVP Result: NotDetec (06-14 @ 17:00)      Tubes/Lines: LIJ cenral line , R PICC (placed prior to hospital arrival) , L PIV  +NGT, +jacques cath changed in ICU    GLOBAL ISSUE/BEST PRACTICE:  Analgesia: Y  Sedation: Y  HOB elevation: Y  Stress ulcer prophylaxis: Y  VTE prophylaxis: SCDs  Glycemic control: Y  Nutrition: NPO    CODE STATUS: FULL CODE

## 2024-06-18 NOTE — PHYSICAL THERAPY INITIAL EVALUATION ADULT - ADDITIONAL COMMENTS
Patient states he is a resident in a LTC facility.  Secondary to NWB R LE he is unable to ambulate at this time.  As per patient he is independent in transfers and ADLs, but uses wheelchair as primary form of locomotion.

## 2024-06-18 NOTE — PROGRESS NOTE ADULT - SUBJECTIVE AND OBJECTIVE BOX
Date of Service 06-18-24 @ 16:44    Patient is a 53y old  Male who presents with a chief complaint of Acute respiratory failure with hypoxia (18 Jun 2024 12:47)      INTERVAL /OVERNIGHT EVENTS: feeling better    MEDICATIONS  (STANDING):  atorvastatin 40 milliGRAM(s) Oral at bedtime  benzocaine 20% Spray 1 Spray(s) Topical every 6 hours  calamine/zinc oxide Lotion 1 Application(s) Topical two times a day  chlorhexidine 2% Cloths 1 Application(s) Topical daily  dextrose 10% Bolus 125 milliLiter(s) IV Bolus once  dextrose 5%. 1000 milliLiter(s) (50 mL/Hr) IV Continuous <Continuous>  dextrose 5%. 1000 milliLiter(s) (100 mL/Hr) IV Continuous <Continuous>  dextrose 50% Injectable 25 Gram(s) IV Push once  dextrose 50% Injectable 12.5 Gram(s) IV Push once  diltiazem    Tablet 120 milliGRAM(s) Oral every 8 hours  enoxaparin Injectable 100 milliGRAM(s) SubCutaneous every 12 hours  famotidine Injectable 20 milliGRAM(s) IV Push daily  gabapentin 100 milliGRAM(s) Oral every 12 hours  glucagon  Injectable 1 milliGRAM(s) IntraMuscular once  insulin lispro (ADMELOG) corrective regimen sliding scale   SubCutaneous three times a day before meals  metoprolol tartrate 50 milliGRAM(s) Oral every 6 hours  mupirocin 2% Nasal 1 Application(s) Both Nostrils two times a day  sodium chloride 0.45%. 1000 milliLiter(s) (75 mL/Hr) IV Continuous <Continuous>  tiotropium 2.5 MICROgram(s)/olodaterol 2.5 MICROgram(s) (STIOLTO) Inhaler 2 Puff(s) Inhalation daily  vancomycin  IVPB 1250 milliGRAM(s) IV Intermittent every 12 hours  vancomycin  IVPB        MEDICATIONS  (PRN):  acetaminophen     Tablet .. 650 milliGRAM(s) Oral every 6 hours PRN Mild Pain (1 - 3)  metoprolol tartrate Injectable 5 milliGRAM(s) IV Push every 6 hours PRN HR >140  oxyCODONE    IR 10 milliGRAM(s) Oral every 6 hours PRN Severe Pain (7 - 10)  oxyCODONE    IR 5 milliGRAM(s) Oral every 6 hours PRN Moderate Pain (4 - 6)  sodium chloride 0.9% lock flush 10 milliLiter(s) IV Push every 1 hour PRN Pre/post blood products, medications, blood draw, and to maintain line patency      Allergies    ertapenem (Blisters; Rash)  fish (Hives)    Intolerances        REVIEW OF SYSTEMS:  CONSTITUTIONAL: No fever, weight loss, or fatigue  EYES: No eye pain, visual disturbances, or discharge  ENMT:  No difficulty hearing, tinnitus, vertigo; + throat pain  NECK: No pain or stiffness  RESPIRATORY: No cough, wheezing, chills or hemoptysis; No shortness of breath  CARDIOVASCULAR: No chest pain, palpitations, dizziness, or leg swelling  GASTROINTESTINAL: No abdominal or epigastric pain. No nausea, vomiting, or hematemesis; No diarrhea or constipation. No melena or hematochezia.  GENITOURINARY: No dysuria, frequency, hematuria, or incontinence  NEUROLOGICAL: No headaches, memory loss, loss of strength, numbness, or tremors  SKIN: No itching, burning, rashes, or lesions   LYMPH NODES: No enlarged glands  ENDOCRINE: No heat or cold intolerance; No hair loss; No polydipsia or polyuria  MUSCULOSKELETAL: No joint pain or swelling; No muscle, back, or extremity pain  PSYCHIATRIC: No depression, anxiety, mood swings, or difficulty sleeping  HEME/LYMPH: No easy bruising, or bleeding gums  ALLERGY AND IMMUNOLOGIC: No hives or eczema    Vital Signs Last 24 Hrs  T(C): 36.6 (18 Jun 2024 16:21), Max: 37.2 (17 Jun 2024 20:42)  T(F): 97.9 (18 Jun 2024 16:21), Max: 98.9 (17 Jun 2024 20:42)  HR: 97 (18 Jun 2024 16:00) (87 - 146)  BP: 109/71 (18 Jun 2024 16:00) (109/71 - 142/82)  BP(mean): 85 (18 Jun 2024 16:00) (80 - 107)  RR: 26 (18 Jun 2024 16:00) (14 - 30)  SpO2: 92% (18 Jun 2024 16:00) (92% - 100%)    Parameters below as of 18 Jun 2024 15:00  Patient On (Oxygen Delivery Method): room air        PHYSICAL EXAM:  GENERAL: NAD, well-groomed, well-developed  HEAD:  Atraumatic, Normocephalic  EYES: EOMI, PERRLA, conjunctiva and sclera clear  ENMT: No tonsillar erythema, exudates, or enlargement; Moist mucous membranes, Good dentition, No lesions  NECK: Supple, No JVD, Normal thyroid  NERVOUS SYSTEM:  Alert & Oriented X3, Good concentration; Motor Strength 5/5 B/L upper and lower extremities; DTRs 2+ intact and symmetric  CHEST/LUNG: Clear to auscultation bilaterally; No rales, rhonchi, wheezing, or rubs  HEART: Regular rate and rhythm; No murmurs, rubs, or gallops  ABDOMEN: Soft, Nontender, Nondistended; Bowel sounds present  EXTREMITIES:  2+ Peripheral Pulses, No clubbing, cyanosis, or edema  LYMPH: No lymphadenopathy noted  SKIN: + rashes     LABS:                        10.8   7.12  )-----------( 165      ( 18 Jun 2024 05:50 )             35.1     18 Jun 2024 05:50    146    |  115    |  15     ----------------------------<  119    3.6     |  26     |  1.20     Ca    8.1        18 Jun 2024 05:50  Phos  2.2       18 Jun 2024 05:50  Mg     1.9       18 Jun 2024 05:50    TPro  5.7    /  Alb  2.2    /  TBili  0.7    /  DBili  x      /  AST  51     /  ALT  236    /  AlkPhos  123    18 Jun 2024 05:50    PT/INR - ( 18 Jun 2024 05:50 )   PT: 12.3 sec;   INR: 1.05 ratio         PTT - ( 18 Jun 2024 05:50 )  PTT:39.3 sec  Urinalysis Basic - ( 18 Jun 2024 05:50 )    Color: x / Appearance: x / SG: x / pH: x  Gluc: 119 mg/dL / Ketone: x  / Bili: x / Urobili: x   Blood: x / Protein: x / Nitrite: x   Leuk Esterase: x / RBC: x / WBC x   Sq Epi: x / Non Sq Epi: x / Bacteria: x      CAPILLARY BLOOD GLUCOSE      POCT Blood Glucose.: 140 mg/dL (18 Jun 2024 12:39)  POCT Blood Glucose.: 99 mg/dL (18 Jun 2024 05:25)  POCT Blood Glucose.: 116 mg/dL (17 Jun 2024 23:23)  POCT Blood Glucose.: 135 mg/dL (17 Jun 2024 18:13)      RADIOLOGY & ADDITIONAL TESTS:    Notes Reviewed:  [x ] YES  [ ] NO    Care Discussed with Consultants/Other Providers [x ] YES  [ ] NO

## 2024-06-18 NOTE — PROGRESS NOTE ADULT - SUBJECTIVE AND OBJECTIVE BOX
[facility]FECTIOUS DISEASE  Juventino Whitten MD PhD, Symone Hickey MD, Anne-Marie Li MD, Jeffery Jacobs MD, Ryan Romeo MD  and providing coverage with Melody Armstrong MD  Providing Infectious Disease Consultations at [facility], [facility], [facility]'s    Office# [phone] to schedule follow up appointments  Answering Service for urgent calls or New Consults [phone]  Cell# to text for urgent issues Juventino Whitten [phone]     infectious diseases progress note:    SARAH MORRISON is a 53y y. o. Male patient    Overnight and events of the last 24hrs reviewed    Allergies    ertapenem (Blisters; Rash)  fish (Hives)    Intolerances        ANTIBIOTICS/RELEVANT:  antimicrobials  vancomycin  IVPB 1250 milliGRAM(s) IV Intermittent every 12 hours  vancomycin  IVPB        immunologic:    OTHER:  acetaminophen   Oral Liquid .. 650 milliGRAM(s) Oral every 6 hours PRN  benzocaine 20% Spray 1 Spray(s) Topical every 6 hours  calamine/zinc oxide Lotion 1 Application(s) Topical two times a day  chlorhexidine 2% Cloths 1 Application(s) Topical daily  dextrose 10% Bolus 125 milliLiter(s) IV Bolus once  dextrose 5%. 1000 milliLiter(s) IV Continuous <Continuous>  dextrose 5%. 1000 milliLiter(s) IV Continuous <Continuous>  dextrose 50% Injectable 25 Gram(s) IV Push once  dextrose 50% Injectable 12.5 Gram(s) IV Push once  dextrose Oral Gel 15 Gram(s) Oral once PRN  diltiazem    Tablet 120 milliGRAM(s) Oral every 8 hours  enoxaparin Injectable 100 milliGRAM(s) SubCutaneous every 12 hours  famotidine Injectable 20 milliGRAM(s) IV Push daily  glucagon  Injectable 1 milliGRAM(s) IntraMuscular once  insulin lispro (ADMELOG) corrective regimen sliding scale   SubCutaneous every 6 hours  metoprolol tartrate 50 milliGRAM(s) Oral every 6 hours  metoprolol tartrate Injectable 5 milliGRAM(s) IV Push every 6 hours PRN  mupirocin 2% Nasal 1 Application(s) Both Nostrils two times a day  sodium chloride 0.45%. 1000 milliLiter(s) IV Continuous <Continuous>  sodium chloride 0.9% lock flush 10 milliLiter(s) IV Push every 1 hour PRN      Objective:  Vital Signs Last 24 Hrs  T(C): 37.1 (18 Jun 2024 12:16), Max: 37.4 (17 Jun 2024 16:35)  T(F): 98.7 (18 Jun 2024 12:16), Max: 99.3 (17 Jun 2024 16:35)  HR: 129 (18 Jun 2024 12:00) (87 - 146)  BP: 129/75 (18 Jun 2024 12:00) (98/72 - 142/82)  BP(mean): 98 (18 Jun 2024 12:00) (80 - 107)  RR: 14 (18 Jun 2024 12:00) (14 - 30)  SpO2: 99% (18 Jun 2024 12:00) (95% - 100%)    Parameters below as of 18 Jun 2024 12:00  Patient On (Oxygen Delivery Method): room air        T(C): 37.1 (06-18-24 @ 12:16), Max: 38 (06-16-24 @ 18:00)  T(C): 37.1 (06-18-24 @ 12:16), Max: 38 (06-16-24 @ 18:00)  T(C): 37.1 (06-18-24 @ 12:16), Max: 38.6 (06-14-24 @ 18:30)    PHYSICAL EXAM:  HEENT: NC atraumatic  Neck: supple  Respiratory: no accessory muscle use, breathing comfortably  Cardiovascular: distant  Gastrointestinal: normal appearing, nondistended  Extremities: no clubbing, no cyanosis,        LABS:                          10.8   7.12  )-----------( 165      ( 18 Jun 2024 05:50 )             35.1       WBC  7.12 06-18 @ 05:50  8.37 06-17 @ 05:52  9.49 06-16 @ 05:38  15.77 06-15 @ 05:40  34.23 06-14 @ 04:30  23.52 06-13 @ 22:55  12.49 06-11 @ 14:25      06-18    146<H>  |  115<H>  |  15  ----------------------------<  119<H>  3.6   |  26  |  1.20    Ca    8.1<L>      18 Jun 2024 05:50  Phos  2.2     06-18  Mg     1.9     06-18    TPro  5.7<L>  /  Alb  2.2<L>  /  TBili  0.7  /  DBili  x   /  AST  51<H>  /  ALT  236<H>  /  AlkPhos  123<H>  06-18      Creatinine: 1.20 mg/dL (06-18-24 @ 05:50)  Creatinine: 1.20 mg/dL (06-17-24 @ 19:10)  Creatinine: 1.40 mg/dL (06-17-24 @ 05:52)  Creatinine: 1.90 mg/dL (06-16-24 @ 05:38)  Creatinine: 2.60 mg/dL (06-15-24 @ 05:40)  Creatinine: 3.00 mg/dL (06-14-24 @ 13:37)  Creatinine: 3.50 mg/dL (06-14-24 @ 10:05)  Creatinine: 3.10 mg/dL (06-14-24 @ 04:30)  Creatinine: 1.70 mg/dL (06-13-24 @ 23:55)      PT/INR - ( 18 Jun 2024 05:50 )   PT: 12.3 sec;   INR: 1.05 ratio         PTT - ( 18 Jun 2024 05:50 )  PTT:39.3 sec  Urinalysis Basic - ( 18 Jun 2024 05:50 )    Color: x / Appearance: x / SG: x / pH: x  Gluc: 119 mg/dL / Ketone: x  / Bili: x / Urobili: x   Blood: x / Protein: x / Nitrite: x   Leuk Esterase: x / RBC: x / WBC x   Sq Epi: x / Non Sq Epi: x / Bacteria: x            INFLAMMATORY MARKERS      MICROBIOLOGY:              RADIOLOGY & ADDITIONAL STUDIES:

## 2024-06-18 NOTE — PROGRESS NOTE ADULT - ATTENDING COMMENTS
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, discharged to rehab 6/13 and then presented several hours later with dyspnea, suffering 2 brief johnny/asystolic cardiac arrests in ED resulting in shock state, acute respiratory failure, OBI, uncontrolled AFib, now extubated with improving renal function and improved HR control after restarting home meds      NEURO: pain control with tylenol / oxycodone scale as needed   - restart home gabapentin  CV: HR better controlled after restarting home metoprolol and diltiazem with prn lopressor, full AC with lovenox  -  restart home statin  PULM: acute hypoxemic respiratory failure in setting of cardiac arrest, now extubated and stable on minimal NC - continue to wean as able  GI: start diet per speech/swallow recs  - shock liver improving, continue to monitor  RENAL: OBI improving with IVF; change IVF to 1/2 NS for hypernatremia  - aggressive electrolyte repletion with repeat BMP after repletion completed  ID: continue vancomycin for MRSA osteomyelitis  ENDO: FS premeal and qHS with ISS coverage   HEME: on full AC with lovenox, no additional DVT ppx required     Stable for transfer to tele

## 2024-06-18 NOTE — PHARMACOTHERAPY INTERVENTION NOTE - COMMENTS
Vancomycin Dosing Per Pharmacy:  Patient is a 54 yo male being treated for R heel wound/osteomyelitis, currently ordered for IV vancomycin. Renal function improving, patient to receive 1250mg Q12H with repeat level due 6/19 at 5am.    1. Indication for the vancomycin: R heel wound/osteomyelitis  2. Requesting Provider: Dr. Whitten/ICU team  3. Current plan and dosing regimen: 1250 mg Q12H  4. Day of therapy: 15  5. Cultures: Tissue Cx 5/26/24: E faecalis + MRSA. Tissue Cx 6/4/24: ESBL E coli + E faecalis. R heel surgical swab 6/14/24: MRSA + citrobacter freundii + pseudomonas stutzeri.  6. Target trough or AUC/BAYLEE: 400-600  7. Day and time level is due: 6/19 at 05:00  8. Previous levels: 6/4 (19:31): 22, 6/5 (05:47): 14, 6/6 (20:20): 14, 6/9 (04:58): 14.4, 6/11 (04:30): 15.7, 6/13 (05:54): 17.3, 6/14 (01:25): 15.2, 6/14 (04:30): 16.1, 6/15 (5:40): 17.5, 6/16 (5:38): 15.3, 6/17 (05:52): 13.2, 6/18 (05:50): 11.8  9. Expected 24-hour AUC: 443

## 2024-06-18 NOTE — CASE MANAGEMENT PROGRESS NOTE - NSCMPROGRESSNOTE_GEN_ALL_CORE
Chart reviewed from a case management perspective.  Patient currently in ICU pending downgrade.  Remains on IV Vanco, po diet to be started.  Patient is [facility].  WIll monitor for transition needs

## 2024-06-18 NOTE — PROGRESS NOTE ADULT - ASSESSMENT
54 YO M with  AF on Eliquis, indwelling Aguilera, CAD s/p stents, CVA, DMT 2, Hypertension, osteomyelitis s/p debridement, PE, transferred from [facility] for difficulty in breathing.  His recent admission was for osteomyelitis and discharged on IV Vancomycin. Reported at [facility] he was given a dose of ertapenem and developed difficulty breathing followed by apnea. On ED arrival he was unresponsive, apneic, no palpable pulse or blood pressure, EKG rhythm HR < 20/min W/O P waves. Patient intubated,     RECOMMENDATIONS  1-Asystolic cardiac arrest, Acute hypoxic respiratory failure, Shock  - unclear etiology  -rec avoiding carbapenems and fine with just Vanco  -ICU support and monitoring    2-Osteomyeliits right calcaneous  6/4 S/p Selective debridement of wound 04-Jun-2024 10:52:57  Parviz Todd  6/4  TCx MRSA, Escherichia coli ESBL  Right calcaneus bone pathology:  -   Fragments of bone with chronic osteomyelitis.  -   Focal acute osteomyelitis cannot be ruled out.  C/w vancomycin 1 gram IV q12, goal trough 15-20, dosing per pharmacy protocol and plan had been with inability to rule out osteo rec 6 weeks so last day 7/18  PICC placed 6/7    remains in ICU    Thank you for consulting us and involving us in the management of this most interesting and challenging case.  We will follow along in the care of this patient. Please call us at [phone] or text me directly on my cell# at [phone] with any concerns.

## 2024-06-19 DIAGNOSIS — E11.621 TYPE 2 DIABETES MELLITUS WITH FOOT ULCER: ICD-10-CM

## 2024-06-19 LAB
ALBUMIN SERPL ELPH-MCNC: 2.2 G/DL — LOW (ref 3.3–5)
ALP SERPL-CCNC: 124 U/L — HIGH (ref 40–120)
ALT FLD-CCNC: 153 U/L — HIGH (ref 12–78)
ANION GAP SERPL CALC-SCNC: 9 MMOL/L — SIGNIFICANT CHANGE UP (ref 5–17)
AST SERPL-CCNC: 29 U/L — SIGNIFICANT CHANGE UP (ref 15–37)
BASOPHILS # BLD AUTO: 0.03 K/UL — SIGNIFICANT CHANGE UP (ref 0–0.2)
BASOPHILS NFR BLD AUTO: 0.2 % — SIGNIFICANT CHANGE UP (ref 0–2)
BILIRUB SERPL-MCNC: 0.5 MG/DL — SIGNIFICANT CHANGE UP (ref 0.2–1.2)
BUN SERPL-MCNC: 12 MG/DL — SIGNIFICANT CHANGE UP (ref 7–23)
CALCIUM SERPL-MCNC: 8.2 MG/DL — LOW (ref 8.5–10.1)
CHLORIDE SERPL-SCNC: 111 MMOL/L — HIGH (ref 96–108)
CO2 SERPL-SCNC: 24 MMOL/L — SIGNIFICANT CHANGE UP (ref 22–31)
CREAT SERPL-MCNC: 1 MG/DL — SIGNIFICANT CHANGE UP (ref 0.5–1.3)
CULTURE RESULTS: SIGNIFICANT CHANGE UP
CULTURE RESULTS: SIGNIFICANT CHANGE UP
EGFR: 89 ML/MIN/1.73M2 — SIGNIFICANT CHANGE UP
EOSINOPHIL # BLD AUTO: 0.93 K/UL — HIGH (ref 0–0.5)
EOSINOPHIL NFR BLD AUTO: 7.7 % — HIGH (ref 0–6)
GLUCOSE BLDC GLUCOMTR-MCNC: 121 MG/DL — HIGH (ref 70–99)
GLUCOSE BLDC GLUCOMTR-MCNC: 149 MG/DL — HIGH (ref 70–99)
GLUCOSE BLDC GLUCOMTR-MCNC: 153 MG/DL — HIGH (ref 70–99)
GLUCOSE BLDC GLUCOMTR-MCNC: 159 MG/DL — HIGH (ref 70–99)
GLUCOSE SERPL-MCNC: 146 MG/DL — HIGH (ref 70–99)
HCT VFR BLD CALC: 35.2 % — LOW (ref 39–50)
HGB BLD-MCNC: 11.2 G/DL — LOW (ref 13–17)
IMM GRANULOCYTES NFR BLD AUTO: 1 % — HIGH (ref 0–0.9)
LYMPHOCYTES # BLD AUTO: 0.85 K/UL — LOW (ref 1–3.3)
LYMPHOCYTES # BLD AUTO: 7 % — LOW (ref 13–44)
MAGNESIUM SERPL-MCNC: 1.8 MG/DL — SIGNIFICANT CHANGE UP (ref 1.6–2.6)
MCHC RBC-ENTMCNC: 30.5 PG — SIGNIFICANT CHANGE UP (ref 27–34)
MCHC RBC-ENTMCNC: 31.8 GM/DL — LOW (ref 32–36)
MCV RBC AUTO: 95.9 FL — SIGNIFICANT CHANGE UP (ref 80–100)
MONOCYTES # BLD AUTO: 0.76 K/UL — SIGNIFICANT CHANGE UP (ref 0–0.9)
MONOCYTES NFR BLD AUTO: 6.3 % — SIGNIFICANT CHANGE UP (ref 2–14)
NEUTROPHILS # BLD AUTO: 9.4 K/UL — HIGH (ref 1.8–7.4)
NEUTROPHILS NFR BLD AUTO: 77.8 % — HIGH (ref 43–77)
NRBC # BLD: 0 /100 WBCS — SIGNIFICANT CHANGE UP (ref 0–0)
PHOSPHATE SERPL-MCNC: 2.4 MG/DL — LOW (ref 2.5–4.5)
PLATELET # BLD AUTO: 191 K/UL — SIGNIFICANT CHANGE UP (ref 150–400)
POTASSIUM SERPL-MCNC: 3.3 MMOL/L — LOW (ref 3.5–5.3)
POTASSIUM SERPL-SCNC: 3.3 MMOL/L — LOW (ref 3.5–5.3)
PROT SERPL-MCNC: 5.9 G/DL — LOW (ref 6–8.3)
RBC # BLD: 3.67 M/UL — LOW (ref 4.2–5.8)
RBC # FLD: 14.2 % — SIGNIFICANT CHANGE UP (ref 10.3–14.5)
SODIUM SERPL-SCNC: 144 MMOL/L — SIGNIFICANT CHANGE UP (ref 135–145)
SPECIMEN SOURCE: SIGNIFICANT CHANGE UP
SPECIMEN SOURCE: SIGNIFICANT CHANGE UP
VANCOMYCIN FLD-MCNC: 21.3 UG/ML — SIGNIFICANT CHANGE UP (ref 10–25)
WBC # BLD: 12.09 K/UL — HIGH (ref 3.8–10.5)
WBC # FLD AUTO: 12.09 K/UL — HIGH (ref 3.8–10.5)

## 2024-06-19 PROCEDURE — 99221 1ST HOSP IP/OBS SF/LOW 40: CPT

## 2024-06-19 PROCEDURE — 99233 SBSQ HOSP IP/OBS HIGH 50: CPT

## 2024-06-19 RX ORDER — ONDANSETRON HYDROCHLORIDE 2 MG/ML
4 INJECTION INTRAMUSCULAR; INTRAVENOUS ONCE
Refills: 0 | Status: COMPLETED | OUTPATIENT
Start: 2024-06-19 | End: 2024-06-19

## 2024-06-19 RX ORDER — DIPHENHYDRAMINE HCL 12.5MG/5ML
25 ELIXIR ORAL ONCE
Refills: 0 | Status: COMPLETED | OUTPATIENT
Start: 2024-06-19 | End: 2024-06-19

## 2024-06-19 RX ORDER — ASPIRIN 325 MG/1
81 TABLET, FILM COATED ORAL DAILY
Refills: 0 | Status: DISCONTINUED | OUTPATIENT
Start: 2024-06-19 | End: 2024-07-05

## 2024-06-19 RX ORDER — POTASSIUM PHOSPHATE, MONOBASIC POTASSIUM PHOSPHATE, DIBASIC INJECTION, 236; 224 MG/ML; MG/ML
15 SOLUTION, CONCENTRATE INTRAVENOUS ONCE
Refills: 0 | Status: COMPLETED | OUTPATIENT
Start: 2024-06-19 | End: 2024-06-19

## 2024-06-19 RX ORDER — POTASSIUM CHLORIDE 600 MG/1
20 TABLET, FILM COATED, EXTENDED RELEASE ORAL ONCE
Refills: 0 | Status: COMPLETED | OUTPATIENT
Start: 2024-06-19 | End: 2024-06-19

## 2024-06-19 RX ADMIN — Medication 20 MILLIGRAM(S): at 13:58

## 2024-06-19 RX ADMIN — VANCOMYCIN HYDROCHLORIDE 166.67 MILLIGRAM(S): KIT at 10:01

## 2024-06-19 RX ADMIN — DILTIAZEM HYDROCHLORIDE 120 MILLIGRAM(S): 240 CAPSULE, EXTENDED RELEASE ORAL at 05:32

## 2024-06-19 RX ADMIN — Medication 25 MILLIGRAM(S): at 00:10

## 2024-06-19 RX ADMIN — Medication 25 MILLIGRAM(S): at 16:09

## 2024-06-19 RX ADMIN — VANCOMYCIN HYDROCHLORIDE 166.67 MILLIGRAM(S): KIT at 21:34

## 2024-06-19 RX ADMIN — POTASSIUM CHLORIDE 20 MILLIEQUIVALENT(S): 600 TABLET, FILM COATED, EXTENDED RELEASE ORAL at 13:54

## 2024-06-19 RX ADMIN — Medication 100 MILLIGRAM(S): at 05:32

## 2024-06-19 RX ADMIN — ONDANSETRON HYDROCHLORIDE 4 MILLIGRAM(S): 2 INJECTION INTRAMUSCULAR; INTRAVENOUS at 06:47

## 2024-06-19 RX ADMIN — ENOXAPARIN SODIUM 100 MILLIGRAM(S): 100 INJECTION SUBCUTANEOUS at 17:12

## 2024-06-19 RX ADMIN — DEXTROSE MONOHYDRATE AND SODIUM CHLORIDE 75 MILLILITER(S): 5; .3 INJECTION, SOLUTION INTRAVENOUS at 23:57

## 2024-06-19 RX ADMIN — OXYCODONE HYDROCHLORIDE 10 MILLIGRAM(S): 100 SOLUTION ORAL at 05:31

## 2024-06-19 RX ADMIN — Medication 50 MILLIGRAM(S): at 13:54

## 2024-06-19 RX ADMIN — OXYCODONE HYDROCHLORIDE 10 MILLIGRAM(S): 100 SOLUTION ORAL at 17:33

## 2024-06-19 RX ADMIN — INSULIN LISPRO 2: 100 INJECTION, SOLUTION SUBCUTANEOUS at 12:49

## 2024-06-19 RX ADMIN — OXYCODONE HYDROCHLORIDE 5 MILLIGRAM(S): 100 SOLUTION ORAL at 00:10

## 2024-06-19 RX ADMIN — Medication 1 APPLICATION(S): at 05:32

## 2024-06-19 RX ADMIN — Medication 100 MILLIGRAM(S): at 17:11

## 2024-06-19 RX ADMIN — Medication 50 MILLIGRAM(S): at 00:10

## 2024-06-19 RX ADMIN — DILTIAZEM HYDROCHLORIDE 120 MILLIGRAM(S): 240 CAPSULE, EXTENDED RELEASE ORAL at 13:54

## 2024-06-19 RX ADMIN — OXYCODONE HYDROCHLORIDE 5 MILLIGRAM(S): 100 SOLUTION ORAL at 01:00

## 2024-06-19 RX ADMIN — Medication 50 MILLIGRAM(S): at 05:32

## 2024-06-19 RX ADMIN — ATORVASTATIN CALCIUM 40 MILLIGRAM(S): 20 TABLET, FILM COATED ORAL at 21:34

## 2024-06-19 RX ADMIN — Medication 25 MILLIGRAM(S): at 23:57

## 2024-06-19 RX ADMIN — Medication 1 APPLICATION(S): at 17:12

## 2024-06-19 RX ADMIN — POTASSIUM PHOSPHATE, MONOBASIC POTASSIUM PHOSPHATE, DIBASIC INJECTION, 83.33 MILLIMOLE(S): 236; 224 SOLUTION, CONCENTRATE INTRAVENOUS at 13:55

## 2024-06-19 RX ADMIN — OXYCODONE HYDROCHLORIDE 10 MILLIGRAM(S): 100 SOLUTION ORAL at 18:33

## 2024-06-19 RX ADMIN — INSULIN LISPRO 2: 100 INJECTION, SOLUTION SUBCUTANEOUS at 17:12

## 2024-06-19 RX ADMIN — MUPIROCIN 1 APPLICATION(S): 20 OINTMENT TOPICAL at 17:11

## 2024-06-19 RX ADMIN — Medication 5 MILLIGRAM(S): at 05:47

## 2024-06-19 RX ADMIN — OXYCODONE HYDROCHLORIDE 10 MILLIGRAM(S): 100 SOLUTION ORAL at 06:20

## 2024-06-19 RX ADMIN — ENOXAPARIN SODIUM 100 MILLIGRAM(S): 100 INJECTION SUBCUTANEOUS at 05:32

## 2024-06-19 RX ADMIN — DILTIAZEM HYDROCHLORIDE 120 MILLIGRAM(S): 240 CAPSULE, EXTENDED RELEASE ORAL at 21:34

## 2024-06-19 RX ADMIN — MUPIROCIN 1 APPLICATION(S): 20 OINTMENT TOPICAL at 05:32

## 2024-06-19 NOTE — PROGRESS NOTE ADULT - SUBJECTIVE AND OBJECTIVE BOX
[facility] -- Brittany Lutz Pannella, Patel, Savella, Goodger, Cohen  Office # [phone]    Follow Up:  Acute respiratory failure with hypoxia    Subjective/Observations: seen and examined, awake, alert, resting comfortably in bed, denies chest pain, dyspnea, palpitations or dizziness, orthopnea and PND. Tolerating room air. ABX infusing .no events overnight     REVIEW OF SYSTEMS: All other review of systems is negative unless indicated above  PAST MEDICAL & SURGICAL HISTORY:  Diabetes      Diabetes mellitus with no complication      Afib      Hypertension      BPH (benign prostatic hyperplasia)      Perforated gastric ulcer  s/p emergent ex-lap omentopexy and plication 6/2019      Pulmonary embolism      History of non-ST elevation myocardial infarction (NSTEMI)      Osteomyelitis  s/p debridement      CAD S/P percutaneous coronary angioplasty      Cerebrovascular accident      H/O abdominal surgery      Perforated gastric ulcer      Traumatic amputation of left foot, initial encounter        MEDICATIONS  (STANDING):  atorvastatin 40 milliGRAM(s) Oral at bedtime  benzocaine 20% Spray 1 Spray(s) Topical every 6 hours  calamine/zinc oxide Lotion 1 Application(s) Topical two times a day  chlorhexidine 2% Cloths 1 Application(s) Topical daily  dextrose 10% Bolus 125 milliLiter(s) IV Bolus once  dextrose 5%. 1000 milliLiter(s) (50 mL/Hr) IV Continuous <Continuous>  dextrose 5%. 1000 milliLiter(s) (100 mL/Hr) IV Continuous <Continuous>  dextrose 50% Injectable 25 Gram(s) IV Push once  dextrose 50% Injectable 12.5 Gram(s) IV Push once  diltiazem    Tablet 120 milliGRAM(s) Oral every 8 hours  enoxaparin Injectable 100 milliGRAM(s) SubCutaneous every 12 hours  famotidine Injectable 20 milliGRAM(s) IV Push daily  gabapentin 100 milliGRAM(s) Oral every 12 hours  glucagon  Injectable 1 milliGRAM(s) IntraMuscular once  insulin lispro (ADMELOG) corrective regimen sliding scale   SubCutaneous three times a day before meals  metoprolol tartrate 50 milliGRAM(s) Oral every 6 hours  mupirocin 2% Nasal 1 Application(s) Both Nostrils two times a day  sodium chloride 0.45%. 1000 milliLiter(s) (75 mL/Hr) IV Continuous <Continuous>  tiotropium 2.5 MICROgram(s)/olodaterol 2.5 MICROgram(s) (STIOLTO) Inhaler 2 Puff(s) Inhalation daily  vancomycin  IVPB 1250 milliGRAM(s) IV Intermittent every 12 hours  vancomycin  IVPB        MEDICATIONS  (PRN):  acetaminophen     Tablet .. 650 milliGRAM(s) Oral every 6 hours PRN Mild Pain (1 - 3)  metoprolol tartrate Injectable 5 milliGRAM(s) IV Push every 6 hours PRN HR >140  oxyCODONE    IR 5 milliGRAM(s) Oral every 6 hours PRN Moderate Pain (4 - 6)  oxyCODONE    IR 10 milliGRAM(s) Oral every 6 hours PRN Severe Pain (7 - 10)  sodium chloride 0.9% lock flush 10 milliLiter(s) IV Push every 1 hour PRN Pre/post blood products, medications, blood draw, and to maintain line patency    Allergies    ertapenem (Blisters; Rash)  fish (Hives)    Intolerances      Vital Signs Last 24 Hrs  T(C): 37.1 (19 Jun 2024 04:52), Max: 37.3 (18 Jun 2024 20:14)  T(F): 98.7 (19 Jun 2024 04:52), Max: 99.2 (18 Jun 2024 20:14)  HR: 87 (19 Jun 2024 04:52) (86 - 144)  BP: 119/69 (19 Jun 2024 04:52) (109/71 - 129/75)  BP(mean): 87 (18 Jun 2024 20:00) (80 - 98)  RR: 17 (19 Jun 2024 04:52) (14 - 26)  SpO2: 93% (19 Jun 2024 04:52) (92% - 100%)    Parameters below as of 19 Jun 2024 04:52  Patient On (Oxygen Delivery Method): room air      I&O's Summary    18 Jun 2024 07:01  -  19 Jun 2024 07:00  --------------------------------------------------------  IN: 2135 mL / OUT: 1580 mL / NET: 555 mL          TELE: Afib   PHYSICAL EXAM:  Constitutional: NAD, awake and alert  HEENT: Moist Mucous Membranes, Anicteric  Pulmonary: Non-labored, breath sounds are clear bilaterally, No wheezing, rales or rhonchi  Cardiovascular: IRRR, S1 and S2, No murmurs, rubs, gallops or clicks  Gastrointestinal: Bowel Sounds present, soft, nontender.   Lymph: No peripheral edema. No lymphadenopathy.  Skin: No visible rashes or ulcers. RUE PICC  Psych:  Mood & affect appropriate  LABS: All Labs Reviewed:                        11.2   12.09 )-----------( 191      ( 19 Jun 2024 06:15 )             35.2                         10.8   7.12  )-----------( 165      ( 18 Jun 2024 05:50 )             35.1                         10.9   8.37  )-----------( 162      ( 17 Jun 2024 05:52 )             35.2     19 Jun 2024 06:15    144    |  111    |  12     ----------------------------<  146    3.3     |  24     |  1.00   18 Jun 2024 05:50    146    |  115    |  15     ----------------------------<  119    3.6     |  26     |  1.20   17 Jun 2024 19:10    148    |  116    |  18     ----------------------------<  131    3.7     |  23     |  1.20     Ca    8.2        19 Jun 2024 06:15  Ca    8.1        18 Jun 2024 05:50  Ca    8.1        17 Jun 2024 19:10  Phos  2.4       19 Jun 2024 06:15  Phos  2.2       18 Jun 2024 05:50  Phos  2.8       17 Jun 2024 19:10  Mg     1.8       19 Jun 2024 06:15  Mg     1.9       18 Jun 2024 05:50  Mg     2.0       17 Jun 2024 19:10    TPro  5.9    /  Alb  2.2    /  TBili  0.5    /  DBili  x      /  AST  29     /  ALT  153    /  AlkPhos  124    19 Jun 2024 06:15  TPro  5.7    /  Alb  2.2    /  TBili  0.7    /  DBili  x      /  AST  51     /  ALT  236    /  AlkPhos  123    18 Jun 2024 05:50  TPro  5.6    /  Alb  2.1    /  TBili  0.7    /  DBili  x      /  AST  79     /  ALT  351    /  AlkPhos  128    17 Jun 2024 05:52    PT/INR - ( 18 Jun 2024 05:50 )   PT: 12.3 sec;   INR: 1.05 ratio         PTT - ( 18 Jun 2024 05:50 )  PTT:39.3 sec      12 Lead ECG:   Ventricular Rate 138 BPM    QRS Duration 70 ms    Q-T Interval 328 ms    QTC Calculation(Bazett) 496 ms    R Axis 83 degrees    T Axis 57 degrees    Diagnosis Line Atrial fibrillation with rapid ventricular response with premature ventricular or aberrantly conducted complexes  Low voltage QRS  Septal infarct (cited on or before 28-MAY-2024)  Abnormal ECG  When compared with ECG of 13-JUN-2024 22:58, (Unconfirmed)    Vent. rate has increased  BPM  Incomplete right bundle branch block is nolonger present  Confirmed by Juventino Soto MD ([id number]) on 6/14/2024 4:29:51 PM (06-14-24 @ 11:03)      TRANSTHORACIC ECHOCARDIOGRAM REPORT  ________________________________________________________________________________                                      _______       Pt. Name:       SARAH MORRISON Study Date:    6/14/2024  MRN:            [id number]         YOB: 1970  Accession #:    [id number]        Age:           53 years  Account#:       [id number]       Gender:        M  Visit ID#  Heart Rate:                      Height:        72.05 in (183.00 cm)  Rhythm:          Weight:        222.66 lb (101.00 kg)  Blood Pressure: 88/53 mmHg       BSA/BMI:       2.23 m² / 30.16 kg/m²  ________________________________________________________________________________________  Referring Physician:    [id number] Hill Brizuela  Interpreting Physician: Rahel Boss MD  Primary Sonographer:    Mounika FELDMAN    CPT:               ECHO TTE WO CON COMP W DOPP - 76489.m  Indication(s):     Heart failure, unspecified - I50.9  Procedure:         Transthoracic echocardiogram with 2-D, M-mode and complete                     spectral and color flow Doppler.  Ordering Location: ICU1  Admission Status:  Inpatient    _______________________________________________________________________________________     CONCLUSIONS:      1. Left ventricular cavity is normal in size. Left ventricular systolic function is normal with an ejection fraction visually estimated at 55 to 60 %. There are no regional wall motion abnormalities seen.   2. Moderate to severe left ventricular hypertrophy.   3. Normal right ventricular cavity size, with normal wall thickness, and normal systolic function.   4. The left atrium is severely dilated.   5. The right atrium is severely dilated.   6. Trileaflet aortic valve with normal systolic excursion. There is focal calcification of the aortic valve leaflets.   7. Mild to moderate mitral regurgitation.   8. There is moderate calcification of the mitral valve annulus.   9. Structurally normal tricuspid valve with normal leaflet excursion.Mild tricuspid regurgitation.  10. The inferior vena cava is normal in size measuring 1.70 cm in diameter, (normal <2.1cm) with normal inspiratory collapse (normal >50%) consistent with normal right atrial pressure (~3, range 0-5mmHg).  11. Estimatedpulmonary artery systolic pressure is 43 mmHg.    ________________________________________________________________________________________  FINDINGS:     Left Ventricle:  The left ventricular cavity is normal in size. Left ventricular systolic function is normal with an ejection fraction visually estimated at 55 to 60%. There are no regional wall motion abnormalities seen. There is normal left ventricular diastolic function, with normal filling pressure. Moderate to severe left ventricular hypertrophy.     Right Ventricle:  The right ventricular cavity is normal in size, with normal wall thickness and normal systolic function.     Left Atrium:  The left atrium is severely dilated.     Right Atrium:  The right atrium is severely dilated.     Aortic Valve:  The aortic valve appears trileaflet with normal systolic excursion. There is focal calcification of the aortic valve leaflets. There is no aortic valve stenosis. There is no evidence of aortic regurgitation.     Mitral Valve:  There is moderate calcification of the mitral valve annulus. There is mild to moderate mitral regurgitation.     Tricuspid Valve:  Structurally normal tricuspid valve with normal leaflet excursion. There is mild tricuspid regurgitation. Estimated pulmonary artery systolic pressure is 43 mmHg.     Pulmonic Valve:  The pulmonic valve was not well visualized.     Systemic Veins:  The inferior vena cava is normal in size measuring 1.70 cm in diameter, (normal <2.1cm) with normal inspiratory collapse (normal >50%) consistent with normal right atrial pressure (~3, range 0-5mmHg).  ____________________________________________________________________  QUANTITATIVE DATA:  Left Ventricle Measurements: (Indexed to BSA)     IVSd (2D):   1.7 cm  LVPWd (2D):  1.4 cm  LVIDd (2D):  4.3 cm  LVIDs (2D):  3.0 cm  LV Mass:     281 g  125.9 g/m²  Visualized LV EF%: 55 to 60%     MV E Vmax:    1.07 m/s  MV A Vmax:    0.00 m/s  e' lateral:   9.68 cm/s  e' medial:    10.90 cm/s  E/e' lateral: 11.05  E/e' medial:  9.82  E/e' Average: 10.40  MV DT:        180 msec    Aorta Measurements: (Normal range) (Indexed to BSA)     Sinuses of Valsalva: 3.40 cm (3.1 - 3.7 cm)       Left Atrium Measurements: (Indexed to BSA)  LA Diam 2D: 4.00 cm       LVOT / RVOT/ Qp/Qs Data: (Indexed to BSA)  LVOT Diameter: 2.10 cm  LVOT Area:     3.46 cm²    Mitral Valve Measurements:     MV E Vmax: 1.1 m/s  MV A Vmax: 0.0 m/s       Tricuspid Valve Measurements:     TR Vmax:          3.2 m/s  TR Peak Gradient: 40.4 mmHg  RA Pressure:      3 mmHg  PASP:             43 mmHg    ________________________________________________________________________________________  Electronically signed on 6/14/2024 at 12:08:59 PM by Rahel Boss MD         *** Final ***

## 2024-06-19 NOTE — PROGRESS NOTE ADULT - SUBJECTIVE AND OBJECTIVE BOX
Patient is a 53y old  Male who presents with a chief complaint of Acute respiratory failure with hypoxia (19 Jun 2024 15:28)      INTERVAL HPI/OVERNIGHT EVENTS:  T(C): 36.7 (06-19-24 @ 11:19), Max: 37.3 (06-18-24 @ 20:14)  HR: 80 (06-19-24 @ 11:19) (80 - 121)  BP: 122/77 (06-19-24 @ 11:19) (112/72 - 129/70)  RR: 18 (06-19-24 @ 11:19) (17 - 24)  SpO2: 92% (06-19-24 @ 11:19) (92% - 96%)  Wt(kg): --  I&O's Summary    18 Jun 2024 07:01  -  19 Jun 2024 07:00  --------------------------------------------------------  IN: 2135 mL / OUT: 1580 mL / NET: 555 mL        LABS:                        11.2   12.09 )-----------( 191      ( 19 Jun 2024 06:15 )             35.2     06-19    144  |  111<H>  |  12  ----------------------------<  146<H>  3.3<L>   |  24  |  1.00    Ca    8.2<L>      19 Jun 2024 06:15  Phos  2.4     06-19  Mg     1.8     06-19    TPro  5.9<L>  /  Alb  2.2<L>  /  TBili  0.5  /  DBili  x   /  AST  29  /  ALT  153<H>  /  AlkPhos  124<H>  06-19    PT/INR - ( 18 Jun 2024 05:50 )   PT: 12.3 sec;   INR: 1.05 ratio         PTT - ( 18 Jun 2024 05:50 )  PTT:39.3 sec  Urinalysis Basic - ( 19 Jun 2024 06:15 )    Color: x / Appearance: x / SG: x / pH: x  Gluc: 146 mg/dL / Ketone: x  / Bili: x / Urobili: x   Blood: x / Protein: x / Nitrite: x   Leuk Esterase: x / RBC: x / WBC x   Sq Epi: x / Non Sq Epi: x / Bacteria: x      CAPILLARY BLOOD GLUCOSE      POCT Blood Glucose.: 159 mg/dL (19 Jun 2024 11:50)  POCT Blood Glucose.: 149 mg/dL (19 Jun 2024 08:03)  POCT Blood Glucose.: 131 mg/dL (18 Jun 2024 21:21)  POCT Blood Glucose.: 132 mg/dL (18 Jun 2024 18:01)        Urinalysis Basic - ( 19 Jun 2024 06:15 )    Color: x / Appearance: x / SG: x / pH: x  Gluc: 146 mg/dL / Ketone: x  / Bili: x / Urobili: x   Blood: x / Protein: x / Nitrite: x   Leuk Esterase: x / RBC: x / WBC x   Sq Epi: x / Non Sq Epi: x / Bacteria: x        MEDICATIONS  (STANDING):  aspirin enteric coated 81 milliGRAM(s) Oral daily  atorvastatin 40 milliGRAM(s) Oral at bedtime  benzocaine 20% Spray 1 Spray(s) Topical every 6 hours  calamine/zinc oxide Lotion 1 Application(s) Topical two times a day  chlorhexidine 2% Cloths 1 Application(s) Topical daily  dextrose 10% Bolus 125 milliLiter(s) IV Bolus once  dextrose 5%. 1000 milliLiter(s) (100 mL/Hr) IV Continuous <Continuous>  dextrose 5%. 1000 milliLiter(s) (50 mL/Hr) IV Continuous <Continuous>  dextrose 50% Injectable 25 Gram(s) IV Push once  dextrose 50% Injectable 12.5 Gram(s) IV Push once  diltiazem    Tablet 120 milliGRAM(s) Oral every 8 hours  diphenhydrAMINE Injectable 25 milliGRAM(s) IV Push once  enoxaparin Injectable 100 milliGRAM(s) SubCutaneous every 12 hours  famotidine Injectable 20 milliGRAM(s) IV Push daily  gabapentin 100 milliGRAM(s) Oral every 12 hours  glucagon  Injectable 1 milliGRAM(s) IntraMuscular once  insulin lispro (ADMELOG) corrective regimen sliding scale   SubCutaneous three times a day before meals  metoprolol tartrate 50 milliGRAM(s) Oral every 6 hours  mupirocin 2% Nasal 1 Application(s) Both Nostrils two times a day  sodium chloride 0.45%. 1000 milliLiter(s) (75 mL/Hr) IV Continuous <Continuous>  tiotropium 2.5 MICROgram(s)/olodaterol 2.5 MICROgram(s) (STIOLTO) Inhaler 2 Puff(s) Inhalation daily  vancomycin  IVPB 1250 milliGRAM(s) IV Intermittent every 12 hours  vancomycin  IVPB        MEDICATIONS  (PRN):  acetaminophen     Tablet .. 650 milliGRAM(s) Oral every 6 hours PRN Mild Pain (1 - 3)  metoprolol tartrate Injectable 5 milliGRAM(s) IV Push every 6 hours PRN HR >140  oxyCODONE    IR 10 milliGRAM(s) Oral every 6 hours PRN Severe Pain (7 - 10)  oxyCODONE    IR 5 milliGRAM(s) Oral every 6 hours PRN Moderate Pain (4 - 6)  sodium chloride 0.9% lock flush 10 milliLiter(s) IV Push every 1 hour PRN Pre/post blood products, medications, blood draw, and to maintain line patency      REVIEW OF SYSTEMS:  CONSTITUTIONAL: No fever, weight loss, or fatigue  EYES: No eye pain, visual disturbances, or discharge  ENMT:  No difficulty hearing, tinnitus, vertigo; No sinus or throat pain  NECK: No pain or stiffness  RESPIRATORY: No cough, wheezing, chills or hemoptysis; No shortness of breath  CARDIOVASCULAR: No chest pain, palpitations, dizziness, or leg swelling  GASTROINTESTINAL: No abdominal or epigastric pain. No nausea, vomiting, or hematemesis; No diarrhea or constipation. No melena or hematochezia.  GENITOURINARY: No dysuria, frequency, hematuria, or incontinence  NEUROLOGICAL: No headaches, memory loss, loss of strength, numbness, or tremors  SKIN: No itching, burning, rashes, or lesions   LYMPH NODES: No enlarged glands  ENDOCRINE: No heat or cold intolerance; No hair loss  MUSCULOSKELETAL: No joint pain or swelling; No muscle, back, or extremity pain  PSYCHIATRIC: No depression, anxiety, mood swings, or difficulty sleeping  HEME/LYMPH: No easy bruising, or bleeding gums  ALLERY AND IMMUNOLOGIC: No hives or eczema    RADIOLOGY & ADDITIONAL TESTS:    Imaging Personally Reviewed:  [ x] YES  [ ] NO    Consultant(s) Notes Reviewed:  x] YES  [ ] NO    PHYSICAL EXAM:  GENERAL: NAD, well-groomed, well-developed  HEAD:  Atraumatic, Normocephalic  EYES: EOMI, PERRLA, conjunctiva and sclera clear  ENMT: No tonsillar erythema, exudates, or enlargement; Moist mucous membranes, Good dentition, No lesions  NECK: Supple, No JVD, Normal thyroid  NERVOUS SYSTEM:  Alert & Oriented X3, Good concentration; Motor Strength 5/5 B/L upper and lower extremities; DTRs 2+ intact and symmetric  CHEST/LUNG: Clear to percussion bilaterally; No rales, rhonchi, wheezing, or rubs  HEART: Regular rate and rhythm; No murmurs, rubs, or gallops  ABDOMEN: Soft, Nontender, Nondistended; Bowel sounds present  EXTREMITIES:  2+ Peripheral Pulses, No clubbing, cyanosis, or edema  LYMPH: No lymphadenopathy noted  SKIN: No rashes or lesions    Care Discussed with Consultants/Other Providers [x ] YES  [ ] NO

## 2024-06-19 NOTE — CONSULT NOTE ADULT - SUBJECTIVE AND OBJECTIVE BOX
HPI:  Known from rehab  Claude Villareal is a 52 YO M with past medical history of  AF on Eliquis, indwelling Aguilera, CAD s/p stents, CVA, DMT 2, Hypertension, osteomyelitis s/p debridement, PE, transferred from [facility] for difficulty in breathing.  His recent admission was for osteomyelitis and discharged on IV Vancomycin. On ED arrival he is unresponsive, apneic, no palpable pulse or blood pressure, EKG rhythm HR < 20/min W/O P waves. Patient intubated, Code ACLS activated, IV epinephrine, IV atropine administered, pulse became palpable, still Hypotensive, IV levophed administered. Admitted to ICU consulted,  Patient woke up and agitated try to pull out ETT,  IV propofol started,  Patient had no nausea or vomiting in [facility].   (13 Jun 2024 23:25)    REVIEW OF SYSTEMS    PAST MEDICAL & SURGICAL HISTORY:  Diabetes      Diabetes mellitus with no complication      Afib      Hypertension      BPH (benign prostatic hyperplasia)      Perforated gastric ulcer  s/p emergent ex-lap omentopexy and plication 6/2019      Pulmonary embolism      History of non-ST elevation myocardial infarction (NSTEMI)      Osteomyelitis  s/p debridement      CAD S/P percutaneous coronary angioplasty      Cerebrovascular accident      H/O abdominal surgery      Perforated gastric ulcer      Traumatic amputation of left foot, initial encounter          Allergies    ertapenem (Blisters; Rash)  fish (Hives)    Intolerances        MEDICATIONS  (STANDING):  aspirin enteric coated 81 milliGRAM(s) Oral daily  atorvastatin 40 milliGRAM(s) Oral at bedtime  benzocaine 20% Spray 1 Spray(s) Topical every 6 hours  calamine/zinc oxide Lotion 1 Application(s) Topical two times a day  chlorhexidine 2% Cloths 1 Application(s) Topical daily  dextrose 10% Bolus 125 milliLiter(s) IV Bolus once  dextrose 5%. 1000 milliLiter(s) (50 mL/Hr) IV Continuous <Continuous>  dextrose 5%. 1000 milliLiter(s) (100 mL/Hr) IV Continuous <Continuous>  dextrose 50% Injectable 25 Gram(s) IV Push once  dextrose 50% Injectable 12.5 Gram(s) IV Push once  diltiazem    Tablet 120 milliGRAM(s) Oral every 8 hours  enoxaparin Injectable 100 milliGRAM(s) SubCutaneous every 12 hours  famotidine Injectable 20 milliGRAM(s) IV Push daily  gabapentin 100 milliGRAM(s) Oral every 12 hours  glucagon  Injectable 1 milliGRAM(s) IntraMuscular once  insulin lispro (ADMELOG) corrective regimen sliding scale   SubCutaneous three times a day before meals  metoprolol tartrate 50 milliGRAM(s) Oral every 6 hours  mupirocin 2% Nasal 1 Application(s) Both Nostrils two times a day  sodium chloride 0.45%. 1000 milliLiter(s) (75 mL/Hr) IV Continuous <Continuous>  tiotropium 2.5 MICROgram(s)/olodaterol 2.5 MICROgram(s) (STIOLTO) Inhaler 2 Puff(s) Inhalation daily  vancomycin  IVPB 1250 milliGRAM(s) IV Intermittent every 12 hours  vancomycin  IVPB        MEDICATIONS  (PRN):  acetaminophen     Tablet .. 650 milliGRAM(s) Oral every 6 hours PRN Mild Pain (1 - 3)  metoprolol tartrate Injectable 5 milliGRAM(s) IV Push every 6 hours PRN HR >140  oxyCODONE    IR 10 milliGRAM(s) Oral every 6 hours PRN Severe Pain (7 - 10)  oxyCODONE    IR 5 milliGRAM(s) Oral every 6 hours PRN Moderate Pain (4 - 6)  sodium chloride 0.9% lock flush 10 milliLiter(s) IV Push every 1 hour PRN Pre/post blood products, medications, blood draw, and to maintain line patency      Social History:      FAMILY HISTORY:  FH: pulmonary embolism  Mother    FH: coronary artery disease  Father    FH: stroke  Father        Vital Signs Last 24 Hrs  T(C): 36.7 (19 Jun 2024 11:19), Max: 37.3 (18 Jun 2024 20:14)  T(F): 98.1 (19 Jun 2024 11:19), Max: 99.2 (18 Jun 2024 20:14)  HR: 80 (19 Jun 2024 11:19) (80 - 121)  BP: 109/73 (19 Jun 2024 17:10) (109/73 - 123/80)  BP(mean): 87 (18 Jun 2024 20:00) (87 - 87)  RR: 18 (19 Jun 2024 11:19) (17 - 23)  SpO2: 92% (19 Jun 2024 11:19) (92% - 96%)    Parameters below as of 19 Jun 2024 11:19  Patient On (Oxygen Delivery Method): room air      PHYSICAL EXAM:  Vascular: DP & PT faintly palpable to the right foot, Capillary refill 4 seconds  Neurological: Light touch sensation not intact bilaterally  Musculoskeletal: left foot s/p midfoot amputation, healed.  Dermatological: Right posterior heel ulceration down to skin, subcutaneous tissue, fat, bone, granular base.  Mild periwound erythema noted with HPK       CBC Full  -  ( 19 Jun 2024 06:15 )  WBC Count : 12.09 K/uL  RBC Count : 3.67 M/uL  Hemoglobin : 11.2 g/dL  Hematocrit : 35.2 %  Platelet Count - Automated : 191 K/uL  Mean Cell Volume : 95.9 fl  Mean Cell Hemoglobin : 30.5 pg  Mean Cell Hemoglobin Concentration : 31.8 gm/dL  Auto Neutrophil # : 9.40 K/uL  Auto Lymphocyte # : 0.85 K/uL  Auto Monocyte # : 0.76 K/uL  Auto Eosinophil # : 0.93 K/uL  Auto Basophil # : 0.03 K/uL  Auto Neutrophil % : 77.8 %  Auto Lymphocyte % : 7.0 %  Auto Monocyte % : 6.3 %  Auto Eosinophil % : 7.7 %  Auto Basophil % : 0.2 %    06-19    144  |  111<H>  |  12  ----------------------------<  146<H>  3.3<L>   |  24  |  1.00    Ca    8.2<L>      19 Jun 2024 06:15  Phos  2.4     06-19  Mg     1.8     06-19    TPro  5.9<L>  /  Alb  2.2<L>  /  TBili  0.5  /  DBili  x   /  AST  29  /  ALT  153<H>  /  AlkPhos  124<H>  06-19                          11.2   12.09 )-----------( 191      ( 19 Jun 2024 06:15 )             35.2     PT/INR - ( 18 Jun 2024 05:50 )   PT: 12.3 sec;   INR: 1.05 ratio         PTT - ( 18 Jun 2024 05:50 )  PTT:39.3 sec

## 2024-06-19 NOTE — CONSULT NOTE ADULT - CONSULT REASON
Cardiac arrest, Afib
acute respiratory failure, shock
Acute respiratory failure with hypoxia
Ileus vs early SBO
OBI
Right heel wound

## 2024-06-19 NOTE — PROGRESS NOTE ADULT - ASSESSMENT
54 YO M with past medical history of AFib (on Eliquis), indwelling Aguilera, cerebral aneurysm, CAD (s/p stents), CVA, T2DM, HTN, OM (s/p debridement), PE, perforated gastric ulcer s/p ometnopexy 2019 with Dr. Mejia, transferred from [facility] for difficulty in breathing.      CAD, Afib, HTN  - s/p cardiac arrest x2 w/ AHRF admitted to the ICU, sedated int/ext, on levophed and epinephrine, now downgraded to GMF (6/18)  - No clear evidence of acute ischemia  - resume asa when able for known CAD s/p MICHELLE  - no statin given LFT abn, will resume when able    - arrest and johnny issues likely iatrogenic from double dosing of AVN  blockers  - Telemetry : afib 's overnight continue tele for now   - BP stable and controlled   - continue Lopressor 50mg q6H   - continue diltiazem 120mg Q8H  - continue Lovenox BID for AC    - TTE normal EF and mod-sev mr   - CXR on 6/14 with persistent mild left base infiltrate  - Repeat CXR unchanged   - no sign of volume overload on exam    - ATN with improving creat  - + MRSA, iso , rest management per primary    - Monitor and replete Lytes. Keep K > 4 and Mg > 2  - Will continue to follow.    Jennifer Gudino [facility]  Nurse Practitioner - Cardiology   call TEAMS

## 2024-06-19 NOTE — CONSULT NOTE ADULT - CONSULT REQUESTED DATE/TIME
14-Jun-2024 09:07
14-Jun-2024 00:44
14-Jun-2024 07:18
14-Jun-2024 10:48
14-Jun-2024 08:44
19-Jun-2024 19:33

## 2024-06-19 NOTE — PROGRESS NOTE ADULT - SUBJECTIVE AND OBJECTIVE BOX
[facility] DIV[facility] of INFECTIOUS DISEASE  Juventino Whitten MD PhD, Symone Hickey MD, Anne-Marie Li MD, Jeffery Jacobs MD, Ryan Romeo MD  and providing coverage with Melody Armstrong MD  Providing Infectious Disease Consultations at [facility], [facility], [facility]'s    Office# [phone] to schedule follow up appointments  Answering Service for urgent calls or New Consults [phone]  Cell# to text for urgent issues Juventino Whitten [phone]     infectious diseases progress note:    SARAH MORRISON is a 53y y. o. Male patient    Overnight and events of the last 24hrs reviewed    Allergies    ertapenem (Blisters; Rash)  fish (Hives)    Intolerances        ANTIBIOTICS/RELEVANT:  antimicrobials  vancomycin  IVPB 1250 milliGRAM(s) IV Intermittent every 12 hours  vancomycin  IVPB        immunologic:    OTHER:  acetaminophen     Tablet .. 650 milliGRAM(s) Oral every 6 hours PRN  atorvastatin 40 milliGRAM(s) Oral at bedtime  benzocaine 20% Spray 1 Spray(s) Topical every 6 hours  calamine/zinc oxide Lotion 1 Application(s) Topical two times a day  chlorhexidine 2% Cloths 1 Application(s) Topical daily  dextrose 10% Bolus 125 milliLiter(s) IV Bolus once  dextrose 5%. 1000 milliLiter(s) IV Continuous <Continuous>  dextrose 5%. 1000 milliLiter(s) IV Continuous <Continuous>  dextrose 50% Injectable 25 Gram(s) IV Push once  dextrose 50% Injectable 12.5 Gram(s) IV Push once  diltiazem    Tablet 120 milliGRAM(s) Oral every 8 hours  enoxaparin Injectable 100 milliGRAM(s) SubCutaneous every 12 hours  famotidine Injectable 20 milliGRAM(s) IV Push daily  gabapentin 100 milliGRAM(s) Oral every 12 hours  glucagon  Injectable 1 milliGRAM(s) IntraMuscular once  insulin lispro (ADMELOG) corrective regimen sliding scale   SubCutaneous three times a day before meals  metoprolol tartrate 50 milliGRAM(s) Oral every 6 hours  metoprolol tartrate Injectable 5 milliGRAM(s) IV Push every 6 hours PRN  mupirocin 2% Nasal 1 Application(s) Both Nostrils two times a day  oxyCODONE    IR 10 milliGRAM(s) Oral every 6 hours PRN  oxyCODONE    IR 5 milliGRAM(s) Oral every 6 hours PRN  sodium chloride 0.45%. 1000 milliLiter(s) IV Continuous <Continuous>  sodium chloride 0.9% lock flush 10 milliLiter(s) IV Push every 1 hour PRN  tiotropium 2.5 MICROgram(s)/olodaterol 2.5 MICROgram(s) (STIOLTO) Inhaler 2 Puff(s) Inhalation daily      Objective:  Vital Signs Last 24 Hrs  T(C): 36.7 (19 Jun 2024 11:19), Max: 37.3 (18 Jun 2024 20:14)  T(F): 98.1 (19 Jun 2024 11:19), Max: 99.2 (18 Jun 2024 20:14)  HR: 80 (19 Jun 2024 11:19) (80 - 121)  BP: 122/77 (19 Jun 2024 11:19) (109/71 - 129/70)  BP(mean): 87 (18 Jun 2024 20:00) (83 - 92)  RR: 18 (19 Jun 2024 11:19) (17 - 26)  SpO2: 92% (19 Jun 2024 11:19) (92% - 96%)    Parameters below as of 19 Jun 2024 11:19  Patient On (Oxygen Delivery Method): room air        T(C): 36.7 (06-19-24 @ 11:19), Max: 37.4 (06-17-24 @ 16:35)  T(C): 36.7 (06-19-24 @ 11:19), Max: 38 (06-16-24 @ 18:00)  T(C): 36.7 (06-19-24 @ 11:19), Max: 38 (06-16-24 @ 18:00)    PHYSICAL EXAM:  HEENT: NC atraumatic  Neck: supple  Respiratory: no accessory muscle use, breathing comfortably  Cardiovascular: distant  Gastrointestinal: normal appearing, nondistended  Extremities: no clubbing, no cyanosis,        LABS:                          11.2   12.09 )-----------( 191      ( 19 Jun 2024 06:15 )             35.2       WBC  12.09 06-19 @ 06:15  7.12 06-18 @ 05:50  8.37 06-17 @ 05:52  9.49 06-16 @ 05:38  15.77 06-15 @ 05:40  34.23 06-14 @ 04:30  23.52 06-13 @ 22:55      06-19    144  |  111<H>  |  12  ----------------------------<  146<H>  3.3<L>   |  24  |  1.00    Ca    8.2<L>      19 Jun 2024 06:15  Phos  2.4     06-19  Mg     1.8     06-19    TPro  5.9<L>  /  Alb  2.2<L>  /  TBili  0.5  /  DBili  x   /  AST  29  /  ALT  153<H>  /  AlkPhos  124<H>  06-19      Creatinine: 1.00 mg/dL (06-19-24 @ 06:15)  Creatinine: 1.20 mg/dL (06-18-24 @ 05:50)  Creatinine: 1.20 mg/dL (06-17-24 @ 19:10)  Creatinine: 1.40 mg/dL (06-17-24 @ 05:52)  Creatinine: 1.90 mg/dL (06-16-24 @ 05:38)  Creatinine: 2.60 mg/dL (06-15-24 @ 05:40)  Creatinine: 3.00 mg/dL (06-14-24 @ 13:37)  Creatinine: 3.50 mg/dL (06-14-24 @ 10:05)  Creatinine: 3.10 mg/dL (06-14-24 @ 04:30)  Creatinine: 1.70 mg/dL (06-13-24 @ 23:55)      PT/INR - ( 18 Jun 2024 05:50 )   PT: 12.3 sec;   INR: 1.05 ratio         PTT - ( 18 Jun 2024 05:50 )  PTT:39.3 sec  Urinalysis Basic - ( 19 Jun 2024 06:15 )    Color: x / Appearance: x / SG: x / pH: x  Gluc: 146 mg/dL / Ketone: x  / Bili: x / Urobili: x   Blood: x / Protein: x / Nitrite: x   Leuk Esterase: x / RBC: x / WBC x   Sq Epi: x / Non Sq Epi: x / Bacteria: x            INFLAMMATORY MARKERS      MICROBIOLOGY:              RADIOLOGY & ADDITIONAL STUDIES:

## 2024-06-19 NOTE — CASE MANAGEMENT PROGRESS NOTE - NSCMPROGRESSNOTE_GEN_ALL_CORE
Patient discussed in rounds; remains medically acute on IV Vanco; a-fib overnight on tele. Anticipated DC plan for him to return to [facility] when medically stable; SW following. CM remains available, will continue to follow.

## 2024-06-19 NOTE — CONSULT NOTE ADULT - REASON FOR ADMISSION
AHRF
Acute respiratory failure with hypoxia

## 2024-06-19 NOTE — PROGRESS NOTE ADULT - ASSESSMENT
54 YO M with  AF on Eliquis, indwelling Aguilera, CAD s/p stents, CVA, DMT 2, Hypertension, osteomyelitis s/p debridement, PE, transferred from [facility] for difficulty in breathing.  His recent admission was for osteomyelitis and discharged on IV Vancomycin. Reported at [facility] he was given a dose of ertapenem and developed difficulty breathing followed by apnea. On ED arrival he was unresponsive, apneic, no palpable pulse or blood pressure, EKG rhythm HR < 20/min W/O P waves. Patient intubated,     RECOMMENDATIONS  1-Asystolic cardiac arrest, Acute hypoxic respiratory failure, Shock  - unclear etiology  -rec avoiding carbapenems and fine with just Vanco  -ICU support and monitoring    2-Osteomyeliits right calcaneous  6/4 S/p Selective debridement of wound 04-Jun-2024 10:52:57  Parviz Todd  6/4  TCx MRSA, Escherichia coli ESBL  Right calcaneus bone pathology:  -   Fragments of bone with chronic osteomyelitis.  -   Focal acute osteomyelitis cannot be ruled out.  C/w vancomycin 1 gram IV q12, goal trough 15-20, dosing per pharmacy protocol and plan had been with inability to rule out osteo rec 6 weeks so last day 7/18  PICC placed 6/7      Thank you for consulting us and involving us in the management of this most interesting and challenging case.  We will follow along in the care of this patient. Please call us at [phone] or text me directly on my cell# at [phone] with any concerns.

## 2024-06-19 NOTE — CONSULT NOTE ADULT - PROBLEM SELECTOR RECOMMENDATION 9
Applied aquacel and sterile dressing to the right heel  Noted with granulation tissue to the right heel  C/w local wound care and offload the heel with pillows under the calf area   Rec cont elevation b/l lower extremity    Wound Care Instructions:  -Keep dressing clean, dry, and intact to the b/l lower extremity  -Apply adaptic, silver algiante , gauze, abd, paul, change every other day   -Monitor for any signs of infection including redness, swelling in the leg above the bandage,   -WB as tolerated in surgical shoe left  -NWB right lower extremity, also offload the heel with pillow under the calf to alleviate heel pressure   - If notice nausea/vomiting/fever/chills/chest pain/shortness of breath, if any are present patient must report to the emergency department immediately  -Patient is to follow up with Dr. Hale/Dr. Todd/ Dr. Jewell  within 5 days after discharge at [facility]. Please call to make an appointment [phone].

## 2024-06-19 NOTE — PHARMACOTHERAPY INTERVENTION NOTE - COMMENTS
Vancomycin Dosing Per Pharmacy:  Patient is a 52 yo male being treated for R heel wound/osteomyelitis, currently ordered for IV vancomycin. Renal function improving, patient to receive 1250mg Q12H with repeat level due 6/20 at 9am.    1. Indication for the vancomycin: R heel wound/osteomyelitis  2. Requesting Provider: Dr. Whitten  3. Current plan and dosing regimen: 1250 mg Q12H  4. Day of therapy: 16  5. Cultures: Tissue Cx 5/26/24: E faecalis + MRSA. Tissue Cx 6/4/24: ESBL E coli + E faecalis. R heel surgical swab 6/14/24: MRSA + citrobacter freundii + pseudomonas stutzeri.  6. Target trough or AUC/BAYLEE: 400-600  7. Day and time level is due: 6/20 at 09:00  8. Previous levels: 6/4 (19:31): 22, 6/5 (05:47): 14, 6/6 (20:20): 14, 6/9 (04:58): 14.4, 6/11 (04:30): 15.7, 6/13 (05:54): 17.3, 6/14 (01:25): 15.2, 6/14 (04:30): 16.1, 6/15 (5:40): 17.5, 6/16 (5:38): 15.3, 6/17 (05:52): 13.2, 6/18 (05:50): 11.8, 6/19 (06:15): 21.3  9. Expected 24-hour AUC: 531

## 2024-06-20 DIAGNOSIS — R21 RASH AND OTHER NONSPECIFIC SKIN ERUPTION: ICD-10-CM

## 2024-06-20 LAB
ALBUMIN SERPL ELPH-MCNC: 2 G/DL — LOW (ref 3.3–5)
ALP SERPL-CCNC: 111 U/L — SIGNIFICANT CHANGE UP (ref 40–120)
ALT FLD-CCNC: 92 U/L — HIGH (ref 12–78)
ANION GAP SERPL CALC-SCNC: 6 MMOL/L — SIGNIFICANT CHANGE UP (ref 5–17)
AST SERPL-CCNC: 28 U/L — SIGNIFICANT CHANGE UP (ref 15–37)
BILIRUB SERPL-MCNC: 0.4 MG/DL — SIGNIFICANT CHANGE UP (ref 0.2–1.2)
BUN SERPL-MCNC: 10 MG/DL — SIGNIFICANT CHANGE UP (ref 7–23)
CALCIUM SERPL-MCNC: 8.1 MG/DL — LOW (ref 8.5–10.1)
CHLORIDE SERPL-SCNC: 110 MMOL/L — HIGH (ref 96–108)
CO2 SERPL-SCNC: 24 MMOL/L — SIGNIFICANT CHANGE UP (ref 22–31)
CREAT SERPL-MCNC: 1.1 MG/DL — SIGNIFICANT CHANGE UP (ref 0.5–1.3)
EGFR: 79 ML/MIN/1.73M2 — SIGNIFICANT CHANGE UP
GLUCOSE BLDC GLUCOMTR-MCNC: 139 MG/DL — HIGH (ref 70–99)
GLUCOSE BLDC GLUCOMTR-MCNC: 149 MG/DL — HIGH (ref 70–99)
GLUCOSE BLDC GLUCOMTR-MCNC: 159 MG/DL — HIGH (ref 70–99)
GLUCOSE BLDC GLUCOMTR-MCNC: 181 MG/DL — HIGH (ref 70–99)
GLUCOSE SERPL-MCNC: 172 MG/DL — HIGH (ref 70–99)
HCT VFR BLD CALC: 32.7 % — LOW (ref 39–50)
HGB BLD-MCNC: 10.3 G/DL — LOW (ref 13–17)
MAGNESIUM SERPL-MCNC: 1.7 MG/DL — SIGNIFICANT CHANGE UP (ref 1.6–2.6)
MCHC RBC-ENTMCNC: 30.2 PG — SIGNIFICANT CHANGE UP (ref 27–34)
MCHC RBC-ENTMCNC: 31.5 GM/DL — LOW (ref 32–36)
MCV RBC AUTO: 95.9 FL — SIGNIFICANT CHANGE UP (ref 80–100)
NRBC # BLD: 0 /100 WBCS — SIGNIFICANT CHANGE UP (ref 0–0)
PHOSPHATE SERPL-MCNC: 2.5 MG/DL — SIGNIFICANT CHANGE UP (ref 2.5–4.5)
PLATELET # BLD AUTO: 73 K/UL — LOW (ref 150–400)
POTASSIUM SERPL-MCNC: 3.4 MMOL/L — LOW (ref 3.5–5.3)
POTASSIUM SERPL-SCNC: 3.4 MMOL/L — LOW (ref 3.5–5.3)
PROT SERPL-MCNC: 5.3 G/DL — LOW (ref 6–8.3)
RBC # BLD: 3.41 M/UL — LOW (ref 4.2–5.8)
RBC # FLD: 14.2 % — SIGNIFICANT CHANGE UP (ref 10.3–14.5)
SODIUM SERPL-SCNC: 140 MMOL/L — SIGNIFICANT CHANGE UP (ref 135–145)
VANCOMYCIN TROUGH SERPL-MCNC: 21.6 UG/ML — HIGH (ref 10–20)
WBC # BLD: 7.83 K/UL — SIGNIFICANT CHANGE UP (ref 3.8–10.5)
WBC # FLD AUTO: 7.83 K/UL — SIGNIFICANT CHANGE UP (ref 3.8–10.5)

## 2024-06-20 PROCEDURE — 93010 ELECTROCARDIOGRAM REPORT: CPT

## 2024-06-20 PROCEDURE — 99232 SBSQ HOSP IP/OBS MODERATE 35: CPT

## 2024-06-20 RX ORDER — POTASSIUM PHOSPHATE, MONOBASIC POTASSIUM PHOSPHATE, DIBASIC INJECTION, 236; 224 MG/ML; MG/ML
30 SOLUTION, CONCENTRATE INTRAVENOUS ONCE
Refills: 0 | Status: COMPLETED | OUTPATIENT
Start: 2024-06-20 | End: 2024-06-20

## 2024-06-20 RX ORDER — VANCOMYCIN HYDROCHLORIDE 50 MG/ML
1000 KIT ORAL EVERY 12 HOURS
Refills: 0 | Status: DISCONTINUED | OUTPATIENT
Start: 2024-06-20 | End: 2024-06-21

## 2024-06-20 RX ORDER — FAMOTIDINE 40 MG
20 TABLET ORAL
Refills: 0 | Status: DISCONTINUED | OUTPATIENT
Start: 2024-06-20 | End: 2024-06-26

## 2024-06-20 RX ORDER — CLOBETASOL PROPIONATE 0.5 MG/G
1 CREAM TOPICAL
Refills: 0 | Status: DISCONTINUED | OUTPATIENT
Start: 2024-06-20 | End: 2024-07-05

## 2024-06-20 RX ORDER — HYDROXYZINE PAMOATE 50 MG/1
25 CAPSULE ORAL DAILY
Refills: 0 | Status: DISCONTINUED | OUTPATIENT
Start: 2024-06-20 | End: 2024-06-22

## 2024-06-20 RX ORDER — DIPHENHYDRAMINE HCL 12.5MG/5ML
25 ELIXIR ORAL EVERY 4 HOURS
Refills: 0 | Status: DISCONTINUED | OUTPATIENT
Start: 2024-06-20 | End: 2024-07-05

## 2024-06-20 RX ADMIN — OXYCODONE HYDROCHLORIDE 10 MILLIGRAM(S): 100 SOLUTION ORAL at 06:35

## 2024-06-20 RX ADMIN — DILTIAZEM HYDROCHLORIDE 120 MILLIGRAM(S): 240 CAPSULE, EXTENDED RELEASE ORAL at 21:25

## 2024-06-20 RX ADMIN — Medication 50 MILLIGRAM(S): at 05:13

## 2024-06-20 RX ADMIN — Medication 100 MILLIGRAM(S): at 18:00

## 2024-06-20 RX ADMIN — MUPIROCIN 1 APPLICATION(S): 20 OINTMENT TOPICAL at 05:11

## 2024-06-20 RX ADMIN — DEXTROSE MONOHYDRATE AND SODIUM CHLORIDE 75 MILLILITER(S): 5; .3 INJECTION, SOLUTION INTRAVENOUS at 06:25

## 2024-06-20 RX ADMIN — INSULIN LISPRO 2: 100 INJECTION, SOLUTION SUBCUTANEOUS at 17:53

## 2024-06-20 RX ADMIN — Medication 20 MILLIGRAM(S): at 12:12

## 2024-06-20 RX ADMIN — TIOTROPIUM BROMIDE AND OLODATEROL 2 PUFF(S): 3.124; 2.736 SPRAY, METERED RESPIRATORY (INHALATION) at 06:36

## 2024-06-20 RX ADMIN — HYDROXYZINE PAMOATE 25 MILLIGRAM(S): 50 CAPSULE ORAL at 12:11

## 2024-06-20 RX ADMIN — Medication 50 MILLIGRAM(S): at 18:00

## 2024-06-20 RX ADMIN — Medication 25 MILLIGRAM(S): at 12:11

## 2024-06-20 RX ADMIN — POTASSIUM PHOSPHATE, MONOBASIC POTASSIUM PHOSPHATE, DIBASIC INJECTION, 83.33 MILLIMOLE(S): 236; 224 SOLUTION, CONCENTRATE INTRAVENOUS at 13:52

## 2024-06-20 RX ADMIN — DILTIAZEM HYDROCHLORIDE 120 MILLIGRAM(S): 240 CAPSULE, EXTENDED RELEASE ORAL at 14:01

## 2024-06-20 RX ADMIN — ENOXAPARIN SODIUM 100 MILLIGRAM(S): 100 INJECTION SUBCUTANEOUS at 17:59

## 2024-06-20 RX ADMIN — CLOBETASOL PROPIONATE 1 APPLICATION(S): 0.5 CREAM TOPICAL at 17:53

## 2024-06-20 RX ADMIN — DILTIAZEM HYDROCHLORIDE 120 MILLIGRAM(S): 240 CAPSULE, EXTENDED RELEASE ORAL at 05:13

## 2024-06-20 RX ADMIN — ENOXAPARIN SODIUM 100 MILLIGRAM(S): 100 INJECTION SUBCUTANEOUS at 05:12

## 2024-06-20 RX ADMIN — VANCOMYCIN HYDROCHLORIDE 250 MILLIGRAM(S): KIT at 21:25

## 2024-06-20 RX ADMIN — Medication 1 APPLICATION(S): at 05:11

## 2024-06-20 RX ADMIN — ATORVASTATIN CALCIUM 40 MILLIGRAM(S): 20 TABLET, FILM COATED ORAL at 21:25

## 2024-06-20 RX ADMIN — Medication 50 MILLIGRAM(S): at 12:12

## 2024-06-20 RX ADMIN — OXYCODONE HYDROCHLORIDE 10 MILLIGRAM(S): 100 SOLUTION ORAL at 22:25

## 2024-06-20 RX ADMIN — Medication 25 MILLIGRAM(S): at 21:47

## 2024-06-20 RX ADMIN — OXYCODONE HYDROCHLORIDE 10 MILLIGRAM(S): 100 SOLUTION ORAL at 21:47

## 2024-06-20 RX ADMIN — ASPIRIN 81 MILLIGRAM(S): 325 TABLET, FILM COATED ORAL at 12:12

## 2024-06-20 RX ADMIN — Medication 1 APPLICATION(S): at 17:53

## 2024-06-20 RX ADMIN — INSULIN LISPRO 2: 100 INJECTION, SOLUTION SUBCUTANEOUS at 12:16

## 2024-06-20 RX ADMIN — Medication 100 MILLIGRAM(S): at 05:12

## 2024-06-20 NOTE — CASE MANAGEMENT PROGRESS NOTE - NSCMPROGRESSNOTE_GEN_ALL_CORE
Patient discussed in rounds, remains medically acute. VTACH overnight; remains on IV Vanco. Anticipated DC plan is return to LTC at [facility] when medically stable. SW is following. CM remains available.

## 2024-06-20 NOTE — PHARMACOTHERAPY INTERVENTION NOTE - COMMENTS
Vancomycin Dosing Per Pharmacy:  Patient is a 52 yo male being treated for R heel wound/osteomyelitis, currently ordered for IV vancomycin. Renal function improving, patient to receive 1000 mg Q12H. Next level due 6/21 at 8am.    1. Indication for the vancomycin: R heel wound/osteomyelitis  2. Requesting Provider: Dr. Whitten  3. Current plan and dosing regimen: 1000 mg Q12H  4. Day of therapy: 17  5. Cultures: Tissue Cx 5/26/24: E faecalis + MRSA. Tissue Cx 6/4/24: ESBL E coli + E faecalis. R heel surgical swab 6/14/24: MRSA + citrobacter freundii + pseudomonas stutzeri.  6. Target trough or AUC/BAYLEE: 400-600  7. Day and time level is due: 6/21 at 09:00  8. Previous levels: 6/4 (19:31): 22, 6/5 (05:47): 14, 6/6 (20:20): 14, 6/9 (04:58): 14.4, 6/11 (04:30): 15.7, 6/13 (05:54): 17.3, 6/14 (01:25): 15.2, 6/14 (04:30): 16.1, 6/15 (5:40): 17.5, 6/16 (5:38): 15.3, 6/17 (05:52): 13.2, 6/18 (05:50): 11.8, 6/19 (06:15): 21.3, 6/20 (08:52): 21.6  9. Expected 24-hour AUC: 503

## 2024-06-20 NOTE — PROGRESS NOTE ADULT - ASSESSMENT
54 YO M with past medical history of AFib (on Eliquis), indwelling Aguilera, cerebral aneurysm, CAD (s/p stents), CVA, T2DM, HTN, OM (s/p debridement), PE, perforated gastric ulcer s/p ometnopexy 2019 with Dr. Mejia, transferred from [facility] for difficulty in breathing.      CAD, Afib, HTN  - s/p cardiac arrest x2 w/ AHRF admitted to the ICU, sedated int/ext, on levophed and epinephrine, now downgraded to GMF (6/18)  - No clear evidence of acute ischemia  - continue asa and statin     - arrest and johnny issues likely iatrogenic from double dosing of AVN  blockers  - Telemetry : afib tachy at times, bb held overnight , can change parameters to only hold if sbp < 100   - continue diltiazem 120mg Q8H  - continue Lovenox BID for AC    - TTE normal EF and mod-sev mr   - CXR on 6/14 with persistent mild left base infiltrate  - Repeat CXR unchanged   - no sign of volume overload on exam    - ATN with improving creat  - + MRSA, iso , rest management per primary    - Monitor and replete Lytes. Keep K > 4 and Mg > 2  - Will continue to follow.

## 2024-06-20 NOTE — PROGRESS NOTE ADULT - SUBJECTIVE AND OBJECTIVE BOX
[facility] -- Brittany Lutz Pannella, Patel, Savella Goodger, Cohen  Office # [phone]    Follow Up:  Acute respiratory failure with hypoxia    Subjective/Observations: seen and examined, awake, alert, resting comfortably in bed, denies chest pain, dyspnea, palpitations or dizziness, orthopnea and PND. Tolerating room air. ABX infusing .no events overnight   remains on room air   + hives he states are chronic    REVIEW OF SYSTEMS: All other review of systems is negative unless indicated above    PAST MEDICAL & SURGICAL HISTORY:  Diabetes      Diabetes mellitus with no complication      Afib      Hypertension      BPH (benign prostatic hyperplasia)      Perforated gastric ulcer  s/p emergent ex-lap omentopexy and plication 2019      Pulmonary embolism      History of non-ST elevation myocardial infarction (NSTEMI)      Osteomyelitis  s/p debridement      CAD S/P percutaneous coronary angioplasty      Cerebrovascular accident      H/O abdominal surgery      Perforated gastric ulcer      Traumatic amputation of left foot, initial encounter          MEDICATIONS  (STANDING):  aspirin enteric coated 81 milliGRAM(s) Oral daily  atorvastatin 40 milliGRAM(s) Oral at bedtime  benzocaine 20% Spray 1 Spray(s) Topical every 6 hours  calamine/zinc oxide Lotion 1 Application(s) Topical two times a day  chlorhexidine 2% Cloths 1 Application(s) Topical daily  dextrose 10% Bolus 125 milliLiter(s) IV Bolus once  dextrose 5%. 1000 milliLiter(s) (50 mL/Hr) IV Continuous <Continuous>  dextrose 5%. 1000 milliLiter(s) (100 mL/Hr) IV Continuous <Continuous>  dextrose 50% Injectable 25 Gram(s) IV Push once  dextrose 50% Injectable 12.5 Gram(s) IV Push once  diltiazem    Tablet 120 milliGRAM(s) Oral every 8 hours  enoxaparin Injectable 100 milliGRAM(s) SubCutaneous every 12 hours  famotidine Injectable 20 milliGRAM(s) IV Push daily  gabapentin 100 milliGRAM(s) Oral every 12 hours  glucagon  Injectable 1 milliGRAM(s) IntraMuscular once  insulin lispro (ADMELOG) corrective regimen sliding scale   SubCutaneous three times a day before meals  metoprolol tartrate 50 milliGRAM(s) Oral every 6 hours  sodium chloride 0.45%. 1000 milliLiter(s) (75 mL/Hr) IV Continuous <Continuous>  tiotropium 2.5 MICROgram(s)/olodaterol 2.5 MICROgram(s) (STIOLTO) Inhaler 2 Puff(s) Inhalation daily  vancomycin  IVPB 1250 milliGRAM(s) IV Intermittent every 12 hours  vancomycin  IVPB        MEDICATIONS  (PRN):  acetaminophen     Tablet .. 650 milliGRAM(s) Oral every 6 hours PRN Mild Pain (1 - 3)  metoprolol tartrate Injectable 5 milliGRAM(s) IV Push every 6 hours PRN HR >140  oxyCODONE    IR 5 milliGRAM(s) Oral every 6 hours PRN Moderate Pain (4 - 6)  oxyCODONE    IR 10 milliGRAM(s) Oral every 6 hours PRN Severe Pain (7 - 10)  sodium chloride 0.9% lock flush 10 milliLiter(s) IV Push every 1 hour PRN Pre/post blood products, medications, blood draw, and to maintain line patency      Allergies    ertapenem (Blisters; Rash)  fish (Hives)    Intolerances        Vital Signs Last 24 Hrs  T(C): 37 (2024 05:35), Max: 37 (2024 05:35)  T(F): 98.6 (2024 05:35), Max: 98.6 (2024 05:35)  HR: 83 (2024 07:00) (80 - 138)  BP: 123/75 (2024 05:35) (104/69 - 123/75)  BP(mean): --  RR: 18 (2024 05:35) (18 - 18)  SpO2: 92% (2024 05:35) (92% - 93%)    Parameters below as of 2024 05:35  Patient On (Oxygen Delivery Method): room air        I&O's Summary    2024 07:01  -  2024 07:00  --------------------------------------------------------  IN: 900 mL / OUT: 500 mL / NET: 400 mL          PHYSICAL EXAM:  TELE: af up to 140  Constitutional: NAD, awake and alert, well-developed  HEENT: Moist Mucous Membranes, Anicteric  Pulmonary: Non-labored, breath sounds are clear bilaterally, No wheezing, crackles or rhonchi  Cardiovascular: IRRegular, S1 and S2 nl,murmur   Gastrointestinal: Bowel Sounds present, soft, nontender.   Lymph: + edema le, dressings in place   Psych:  Mood & affect appropriate    LABS: All Labs Reviewed:                           1209 )-----------( 191      ( 2024 06:15 )             35.2                         10.8   7.12  )-----------( 165      ( 2024 05:50 )             35.1     2024 06:15    144    |  111    |  12     ----------------------------<  146    3.3     |  24     |  1.00   2024 05:50    146    |  115    |  15     ----------------------------<  119    3.6     |  26     |  1.20   2024 19:10    148    |  116    |  18     ----------------------------<  131    3.7     |  23     |  1.20     Ca    8.2        2024 06:15  Ca    8.1        2024 05:50  Ca    8.1        2024 19:10  Phos  2.4       2024 06:15  Phos  2.2       2024 05:50  Phos  2.8       2024 19:10  Mg     1.8       2024 06:15  Mg     1.9       2024 05:50  Mg     2.0       2024 19:10    TPro  5.9    /  Alb  2.2    /  TBili  0.5    /  DBili  x      /  AST  29     /  ALT  153    /  AlkPhos  124    2024 06:15  TPro  5.7    /  Alb  2.2    /  TBili  0.7    /  DBili  x      /  AST  51     /  ALT  236    /  AlkPhos  123    2024 05:50             EC Lead ECG:   Ventricular Rate 138 BPM    QRS Duration 70 ms    Q-T Interval 328 ms    QTC Calculation(Bazett) 496 ms    R Axis 83 degrees    T Axis 57 degrees    Diagnosis Line Atrial fibrillation with rapid ventricular response with premature ventricular or aberrantly conducted complexes  Low voltage QRS  Septal infarct (cited on or before 28-MAY-2024)  Abnormal ECG  When compared with ECG of 2024 22:58, (Unconfirmed)    Vent. rate has increased  BPM  Incomplete right bundle branch block is nolonger present  Confirmed by Juventino Soto MD ([id number]) on 2024 4:29:51 PM (24 @ 11:03)      TRANSTHORACIC ECHOCARDIOGRAM REPORT  ________________________________________________________________________________                                      _______       Pt. Name:       SARAH MORRISON Study Date:    2024  MRN:            [id number]         YOB: 1970  Accession #:    [id number]        Age:           53 years  Account#:       [id number]       Gender:        M  Visit ID#  Heart Rate:                      Height:        72.05 in (183.00 cm)  Rhythm:          Weight:        222.66 lb (101.00 kg)  Blood Pressure: 88/53 mmHg       BSA/BMI:       2.23 m² / 30.16 kg/m²  ________________________________________________________________________________________  Referring Physician:    [id number] Hill Brizuela  Interpreting Physician: Rahel Boss MD  Primary Sonographer:    Mounika FELDMAN    CPT:               ECHO TTE WO CON COMP W DOPP - 35152.m  Indication(s):     Heart failure, unspecified - I50.9  Procedure:         Transthoracic echocardiogram with 2-D, M-mode and complete                     spectral and color flow Doppler.  Ordering Location: ICU1  Admission Status:  Inpatient    _______________________________________________________________________________________     CONCLUSIONS:      1. Left ventricular cavity is normal in size. Left ventricular systolic function is normal with an ejection fraction visually estimated at 55 to 60 %. There are no regional wall motion abnormalities seen.   2. Moderate to severe left ventricular hypertrophy.   3. Normal right ventricular cavity size, with normal wall thickness, and normal systolic function.   4. The left atrium is severely dilated.   5. The right atrium is severely dilated.   6. Trileaflet aortic valve with normal systolic excursion. There is focal calcification of the aortic valve leaflets.   7. Mild to moderate mitral regurgitation.   8. There is moderate calcification of the mitral valve annulus.   9. Structurally normal tricuspid valve with normal leaflet excursion.Mild tricuspid regurgitation.  10. The inferior vena cava is normal in size measuring 1.70 cm in diameter, (normal <2.1cm) with normal inspiratory collapse (normal >50%) consistent with normal right atrial pressure (~3, range 0-5mmHg).  11. Estimatedpulmonary artery systolic pressure is 43 mmHg.    ________________________________________________________________________________________  FINDINGS:     Left Ventricle:  The left ventricular cavity is normal in size. Left ventricular systolic function is normal with an ejection fraction visually estimated at 55 to 60%. There are no regional wall motion abnormalities seen. There is normal left ventricular diastolic function, with normal filling pressure. Moderate to severe left ventricular hypertrophy.     Right Ventricle:  The right ventricular cavity is normal in size, with normal wall thickness and normal systolic function.     Left Atrium:  The left atrium is severely dilated.     Right Atrium:  The right atrium is severely dilated.     Aortic Valve:  The aortic valve appears trileaflet with normal systolic excursion. There is focal calcification of the aortic valve leaflets. There is no aortic valve stenosis. There is no evidence of aortic regurgitation.     Mitral Valve:  There is moderate calcification of the mitral valve annulus. There is mild to moderate mitral regurgitation.     Tricuspid Valve:  Structurally normal tricuspid valve with normal leaflet excursion. There is mild tricuspid regurgitation. Estimated pulmonary artery systolic pressure is 43 mmHg.     Pulmonic Valve:  The pulmonic valve was not well visualized.     Systemic Veins:  The inferior vena cava is normal in size measuring 1.70 cm in diameter, (normal <2.1cm) with normal inspiratory collapse (normal >50%) consistent with normal right atrial pressure (~3, range 0-5mmHg).  ____________________________________________________________________  QUANTITATIVE DATA:  Left Ventricle Measurements: (Indexed to BSA)     IVSd (2D):   1.7 cm  LVPWd (2D):  1.4 cm  LVIDd (2D):  4.3 cm  LVIDs (2D):  3.0 cm  LV Mass:     281 g  125.9 g/m²  Visualized LV EF%: 55 to 60%     MV E Vmax:    1.07 m/s  MV A Vmax:    0.00 m/s  e' lateral:   9.68 cm/s  e' medial:    10.90 cm/s  E/e' lateral: 11.05  E/e' medial:  9.82  E/e' Average: 10.40  MV DT:        180 msec    Aorta Measurements: (Normal range) (Indexed to BSA)     Sinuses of Valsalva: 3.40 cm (3.1 - 3.7 cm)       Left Atrium Measurements: (Indexed to BSA)  LA Diam 2D: 4.00 cm       LVOT / RVOT/ Qp/Qs Data: (Indexed to BSA)  LVOT Diameter: 2.10 cm  LVOT Area:     3.46 cm²    Mitral Valve Measurements:     MV E Vmax: 1.1 m/s  MV A Vmax: 0.0 m/s       Tricuspid Valve Measurements:     TR Vmax:          3.2 m/s  TR Peak Gradient: 40.4 mmHg  RA Pressure:      3 mmHg  PASP:             43 mmHg    ________________________________________________________________________________________  Electronically signed on 2024 at 12:08:59 PM by Rahel Boss MD         *** Final ***      Radiology:

## 2024-06-20 NOTE — PROGRESS NOTE ADULT - ASSESSMENT
54 YO M with  AF on Eliquis, indwelling Aguilera, CAD s/p stents, CVA, DMT 2, Hypertension, osteomyelitis s/p debridement, PE, transferred from [facility] for difficulty in breathing.  His recent admission was for osteomyelitis and discharged on IV Vancomycin. Reported at [facility] he was given a dose of ertapenem and developed difficulty breathing followed by apnea. On ED arrival he was unresponsive, apneic, no palpable pulse or blood pressure, EKG rhythm HR < 20/min W/O P waves. Patient intubated,     RECOMMENDATIONS  1-Asystolic cardiac arrest, Acute hypoxic respiratory failure, Shock  - unclear etiology  -rec avoiding carbapenems and fine with just Vanco  -ICU support and monitoring    2-Osteomyeliits right calcaneous  6/4 S/p Selective debridement of wound 04-Jun-2024 10:52:57  Parviz Todd  6/4  TCx MRSA, Escherichia coli ESBL  Right calcaneus bone pathology:  -   Fragments of bone with chronic osteomyelitis.  -   Focal acute osteomyelitis cannot be ruled out.  C/w vancomycin 1 gram IV q12, goal trough 15-20, dosing per pharmacy protocol and plan had been with inability to rule out osteo rec 6 weeks so last day 7/18  PICC placed 6/7    From an ID standpoint no further requirement for inpatient status for the management of ID issues. Fine with discharge from ID standpoint when other medical issues no longer require inpatient care and social issues allow for a safe discharge plan. To schedule an outpatient ID follow up appointment please call our office at [phone]    Thank you for consulting us and involving us in the management of this most interesting and challenging case.  We will follow along in the care of this patient. Please call us at [phone] or text me directly on my cell# at [phone] with any concerns.

## 2024-06-20 NOTE — PROGRESS NOTE ADULT - SUBJECTIVE AND OBJECTIVE BOX
Date of Service 06-20-24 @ 18:28    Patient is a 53y old  Male who presents with a chief complaint of Acute respiratory failure with hypoxia (20 Jun 2024 13:50)      INTERVAL /OVERNIGHT EVENTS: now with resurgence of rash    MEDICATIONS  (STANDING):  aspirin enteric coated 81 milliGRAM(s) Oral daily  atorvastatin 40 milliGRAM(s) Oral at bedtime  benzocaine 20% Spray 1 Spray(s) Topical every 6 hours  calamine/zinc oxide Lotion 1 Application(s) Topical two times a day  chlorhexidine 2% Cloths 1 Application(s) Topical daily  clobetasol 0.05% Cream 1 Application(s) Topical two times a day  dextrose 10% Bolus 125 milliLiter(s) IV Bolus once  dextrose 5%. 1000 milliLiter(s) (50 mL/Hr) IV Continuous <Continuous>  dextrose 5%. 1000 milliLiter(s) (100 mL/Hr) IV Continuous <Continuous>  dextrose 50% Injectable 25 Gram(s) IV Push once  dextrose 50% Injectable 12.5 Gram(s) IV Push once  diltiazem    Tablet 120 milliGRAM(s) Oral every 8 hours  enoxaparin Injectable 100 milliGRAM(s) SubCutaneous every 12 hours  famotidine Injectable 20 milliGRAM(s) IV Push daily  gabapentin 100 milliGRAM(s) Oral every 12 hours  glucagon  Injectable 1 milliGRAM(s) IntraMuscular once  hydrOXYzine hydrochloride 25 milliGRAM(s) Oral daily  insulin lispro (ADMELOG) corrective regimen sliding scale   SubCutaneous three times a day before meals  metoprolol tartrate 50 milliGRAM(s) Oral every 6 hours  sodium chloride 0.45%. 1000 milliLiter(s) (50 mL/Hr) IV Continuous <Continuous>  tiotropium 2.5 MICROgram(s)/olodaterol 2.5 MICROgram(s) (STIOLTO) Inhaler 2 Puff(s) Inhalation daily  vancomycin  IVPB 1000 milliGRAM(s) IV Intermittent every 12 hours    MEDICATIONS  (PRN):  acetaminophen     Tablet .. 650 milliGRAM(s) Oral every 6 hours PRN Mild Pain (1 - 3)  diphenhydrAMINE 25 milliGRAM(s) Oral every 4 hours PRN Rash and/or Itching  metoprolol tartrate Injectable 5 milliGRAM(s) IV Push every 6 hours PRN HR >140  oxyCODONE    IR 5 milliGRAM(s) Oral every 6 hours PRN Moderate Pain (4 - 6)  oxyCODONE    IR 10 milliGRAM(s) Oral every 6 hours PRN Severe Pain (7 - 10)  sodium chloride 0.9% lock flush 10 milliLiter(s) IV Push every 1 hour PRN Pre/post blood products, medications, blood draw, and to maintain line patency      Allergies    ertapenem (Blisters; Rash)  fish (Hives)    Intolerances        REVIEW OF SYSTEMS:  CONSTITUTIONAL: + fatigue  EYES: No eye pain, visual disturbances, or discharge  ENMT:  No difficulty hearing, tinnitus, vertigo; No sinus or throat pain  NECK: No pain or stiffness  RESPIRATORY: No cough, wheezing, chills or hemoptysis; No shortness of breath  CARDIOVASCULAR: No chest pain, palpitations, dizziness, or leg swelling  GASTROINTESTINAL: No abdominal or epigastric pain. No nausea, vomiting, or hematemesis; No diarrhea or constipation. No melena or hematochezia.  GENITOURINARY: No dysuria, frequency, hematuria, or incontinence  NEUROLOGICAL: No headaches, memory loss, loss of strength, numbness, or tremors  SKIN: + rashes,   LYMPH NODES: No enlarged glands  ENDOCRINE: No heat or cold intolerance; No hair loss; No polydipsia or polyuria  MUSCULOSKELETAL: No joint pain or swelling; No muscle, back, or extremity pain  PSYCHIATRIC: No depression, anxiety, mood swings, or difficulty sleeping  HEME/LYMPH: No easy bruising, or bleeding gums  ALLERGY AND IMMUNOLOGIC: No hives or eczema    Vital Signs Last 24 Hrs  T(C): 37.2 (20 Jun 2024 12:20), Max: 37.2 (20 Jun 2024 12:20)  T(F): 99 (20 Jun 2024 12:20), Max: 99 (20 Jun 2024 12:20)  HR: 105 (20 Jun 2024 12:20) (83 - 138)  BP: 105/84 (20 Jun 2024 12:20) (104/69 - 123/75)  BP(mean): --  RR: 18 (20 Jun 2024 12:20) (18 - 18)  SpO2: 93% (20 Jun 2024 12:20) (92% - 93%)    Parameters below as of 20 Jun 2024 12:20  Patient On (Oxygen Delivery Method): room air        PHYSICAL EXAM:  GENERAL: NAD, well-groomed, well-developed  HEAD:  Atraumatic, Normocephalic  EYES: EOMI, PERRLA, conjunctiva and sclera clear  ENMT: No tonsillar erythema, exudates, or enlargement; Moist mucous membranes, Good dentition, No lesions  NECK: Supple, No JVD, Normal thyroid  NERVOUS SYSTEM:  Alert & Oriented X3, Good concentration; Motor Strength 5/5 B/L upper and lower extremities; DTRs 2+ intact and symmetric  CHEST/LUNG: Clear to auscultation bilaterally; No rales, rhonchi, wheezing, or rubs  HEART: Regular rate and rhythm; No murmurs, rubs, or gallops  ABDOMEN: Soft, Nontender, Nondistended; Bowel sounds present  EXTREMITIES:  2+ Peripheral Pulses, No clubbing, cyanosis, or edema  LYMPH: No lymphadenopathy noted  SKIN: + rashes     LABS:                        10.3   7.83  )-----------( 73       ( 20 Jun 2024 12:20 )             32.7     20 Jun 2024 12:20    140    |  110    |  10     ----------------------------<  172    3.4     |  24     |  1.10     Ca    8.1        20 Jun 2024 12:20  Phos  2.5       20 Jun 2024 12:20  Mg     1.7       20 Jun 2024 12:20    TPro  5.3    /  Alb  2.0    /  TBili  0.4    /  DBili  x      /  AST  28     /  ALT  92     /  AlkPhos  111    20 Jun 2024 12:20      Urinalysis Basic - ( 20 Jun 2024 12:20 )    Color: x / Appearance: x / SG: x / pH: x  Gluc: 172 mg/dL / Ketone: x  / Bili: x / Urobili: x   Blood: x / Protein: x / Nitrite: x   Leuk Esterase: x / RBC: x / WBC x   Sq Epi: x / Non Sq Epi: x / Bacteria: x      CAPILLARY BLOOD GLUCOSE      POCT Blood Glucose.: 159 mg/dL (20 Jun 2024 16:54)  POCT Blood Glucose.: 181 mg/dL (20 Jun 2024 12:12)  POCT Blood Glucose.: 139 mg/dL (20 Jun 2024 08:14)  POCT Blood Glucose.: 121 mg/dL (19 Jun 2024 21:33)      RADIOLOGY & ADDITIONAL TESTS:    Notes Reviewed:  [x ] YES  [ ] NO    Care Discussed with Consultants/Other Providers [x ] YES  [ ] NO

## 2024-06-20 NOTE — PROGRESS NOTE ADULT - SUBJECTIVE AND OBJECTIVE BOX
[facility] of INFECTIOUS DISEASE  Juventino Whitten MD PhD, Symone Hickey MD, Anne-Marie Li MD, Jeffery Jacobs MD, Ryan Romeo MD  and providing coverage with Melody Armstrong MD  Providing Infectious Disease Consultations at [facility], [facility], [facility]'s    Office# [phone] to schedule follow up appointments  Answering Service for urgent calls or New Consults [phone]  Cell# to text for urgent issues Juventino Whitten [phone]     infectious diseases progress note:    SARAH MORRISON is a 53y y. o. Male patient    Overnight and events of the last 24hrs reviewed    Allergies    ertapenem (Blisters; Rash)  fish (Hives)    Intolerances        ANTIBIOTICS/RELEVANT:  antimicrobials  vancomycin  IVPB 1000 milliGRAM(s) IV Intermittent every 12 hours    immunologic:    OTHER:  acetaminophen     Tablet .. 650 milliGRAM(s) Oral every 6 hours PRN  aspirin enteric coated 81 milliGRAM(s) Oral daily  atorvastatin 40 milliGRAM(s) Oral at bedtime  benzocaine 20% Spray 1 Spray(s) Topical every 6 hours  calamine/zinc oxide Lotion 1 Application(s) Topical two times a day  chlorhexidine 2% Cloths 1 Application(s) Topical daily  clobetasol 0.05% Cream 1 Application(s) Topical two times a day  dextrose 10% Bolus 125 milliLiter(s) IV Bolus once  dextrose 5%. 1000 milliLiter(s) IV Continuous <Continuous>  dextrose 5%. 1000 milliLiter(s) IV Continuous <Continuous>  dextrose 50% Injectable 25 Gram(s) IV Push once  dextrose 50% Injectable 12.5 Gram(s) IV Push once  diltiazem    Tablet 120 milliGRAM(s) Oral every 8 hours  diphenhydrAMINE 25 milliGRAM(s) Oral every 4 hours PRN  enoxaparin Injectable 100 milliGRAM(s) SubCutaneous every 12 hours  famotidine Injectable 20 milliGRAM(s) IV Push daily  gabapentin 100 milliGRAM(s) Oral every 12 hours  glucagon  Injectable 1 milliGRAM(s) IntraMuscular once  hydrOXYzine hydrochloride 25 milliGRAM(s) Oral daily  insulin lispro (ADMELOG) corrective regimen sliding scale   SubCutaneous three times a day before meals  metoprolol tartrate 50 milliGRAM(s) Oral every 6 hours  metoprolol tartrate Injectable 5 milliGRAM(s) IV Push every 6 hours PRN  oxyCODONE    IR 10 milliGRAM(s) Oral every 6 hours PRN  oxyCODONE    IR 5 milliGRAM(s) Oral every 6 hours PRN  potassium phosphate IVPB 30 milliMole(s) IV Intermittent once  sodium chloride 0.45%. 1000 milliLiter(s) IV Continuous <Continuous>  sodium chloride 0.9% lock flush 10 milliLiter(s) IV Push every 1 hour PRN  tiotropium 2.5 MICROgram(s)/olodaterol 2.5 MICROgram(s) (STIOLTO) Inhaler 2 Puff(s) Inhalation daily      Objective:  Vital Signs Last 24 Hrs  T(C): 37.2 (20 Jun 2024 12:20), Max: 37.2 (20 Jun 2024 12:20)  T(F): 99 (20 Jun 2024 12:20), Max: 99 (20 Jun 2024 12:20)  HR: 105 (20 Jun 2024 12:20) (83 - 138)  BP: 105/84 (20 Jun 2024 12:20) (104/69 - 123/75)  BP(mean): --  RR: 18 (20 Jun 2024 12:20) (18 - 18)  SpO2: 93% (20 Jun 2024 12:20) (92% - 93%)    Parameters below as of 20 Jun 2024 12:20  Patient On (Oxygen Delivery Method): room air        T(C): 37.2 (06-20-24 @ 12:20), Max: 37.3 (06-18-24 @ 20:14)  T(C): 37.2 (06-20-24 @ 12:20), Max: 37.4 (06-17-24 @ 16:35)  T(C): 37.2 (06-20-24 @ 12:20), Max: 38 (06-16-24 @ 18:00)    PHYSICAL EXAM:  HEENT: NC atraumatic  Neck: supple  Respiratory: no accessory muscle use, breathing comfortably  Cardiovascular: distant  Gastrointestinal: normal appearing, nondistended  Extremities: no clubbing, no cyanosis,        LABS:                          10.3   7.83  )-----------( x        ( 20 Jun 2024 12:20 )             32.7       WBC  7.83 06-20 @ 12:20  12.09 06-19 @ 06:15  7.12 06-18 @ 05:50  8.37 06-17 @ 05:52  9.49 06-16 @ 05:38  15.77 06-15 @ 05:40  34.23 06-14 @ 04:30  23.52 06-13 @ 22:55      06-20    140  |  110<H>  |  10  ----------------------------<  172<H>  3.4<L>   |  24  |  1.10    Ca    8.1<L>      20 Jun 2024 12:20  Phos  2.5     06-20  Mg     1.7     06-20    TPro  5.3<L>  /  Alb  2.0<L>  /  TBili  0.4  /  DBili  x   /  AST  28  /  ALT  92<H>  /  AlkPhos  111  06-20      Creatinine: 1.10 mg/dL (06-20-24 @ 12:20)  Creatinine: 1.00 mg/dL (06-19-24 @ 06:15)  Creatinine: 1.20 mg/dL (06-18-24 @ 05:50)  Creatinine: 1.20 mg/dL (06-17-24 @ 19:10)  Creatinine: 1.40 mg/dL (06-17-24 @ 05:52)  Creatinine: 1.90 mg/dL (06-16-24 @ 05:38)  Creatinine: 2.60 mg/dL (06-15-24 @ 05:40)  Creatinine: 3.00 mg/dL (06-14-24 @ 13:37)  Creatinine: 3.50 mg/dL (06-14-24 @ 10:05)  Creatinine: 3.10 mg/dL (06-14-24 @ 04:30)  Creatinine: 1.70 mg/dL (06-13-24 @ 23:55)        Urinalysis Basic - ( 20 Jun 2024 12:20 )    Color: x / Appearance: x / SG: x / pH: x  Gluc: 172 mg/dL / Ketone: x  / Bili: x / Urobili: x   Blood: x / Protein: x / Nitrite: x   Leuk Esterase: x / RBC: x / WBC x   Sq Epi: x / Non Sq Epi: x / Bacteria: x            INFLAMMATORY MARKERS      MICROBIOLOGY:              RADIOLOGY & ADDITIONAL STUDIES:

## 2024-06-21 DIAGNOSIS — I47.29 OTHER VENTRICULAR TACHYCARDIA: ICD-10-CM

## 2024-06-21 LAB
GLUCOSE BLDC GLUCOMTR-MCNC: 121 MG/DL — HIGH (ref 70–99)
GLUCOSE BLDC GLUCOMTR-MCNC: 127 MG/DL — HIGH (ref 70–99)
GLUCOSE BLDC GLUCOMTR-MCNC: 149 MG/DL — HIGH (ref 70–99)
GLUCOSE BLDC GLUCOMTR-MCNC: 219 MG/DL — HIGH (ref 70–99)
VANCOMYCIN TROUGH SERPL-MCNC: 37.5 UG/ML — CRITICAL HIGH (ref 10–20)

## 2024-06-21 PROCEDURE — 99232 SBSQ HOSP IP/OBS MODERATE 35: CPT

## 2024-06-21 RX ORDER — HYDROXYZINE PAMOATE 50 MG/1
25 CAPSULE ORAL ONCE
Refills: 0 | Status: COMPLETED | OUTPATIENT
Start: 2024-06-21 | End: 2024-06-21

## 2024-06-21 RX ORDER — VANCOMYCIN HYDROCHLORIDE 50 MG/ML
1000 KIT ORAL EVERY 12 HOURS
Refills: 0 | Status: DISCONTINUED | OUTPATIENT
Start: 2024-06-21 | End: 2024-06-24

## 2024-06-21 RX ADMIN — DILTIAZEM HYDROCHLORIDE 120 MILLIGRAM(S): 240 CAPSULE, EXTENDED RELEASE ORAL at 22:29

## 2024-06-21 RX ADMIN — Medication 50 MILLIGRAM(S): at 12:06

## 2024-06-21 RX ADMIN — TIOTROPIUM BROMIDE AND OLODATEROL 2 PUFF(S): 3.124; 2.736 SPRAY, METERED RESPIRATORY (INHALATION) at 06:39

## 2024-06-21 RX ADMIN — OXYCODONE HYDROCHLORIDE 10 MILLIGRAM(S): 100 SOLUTION ORAL at 23:30

## 2024-06-21 RX ADMIN — Medication 100 MILLIGRAM(S): at 17:46

## 2024-06-21 RX ADMIN — HYDROXYZINE PAMOATE 25 MILLIGRAM(S): 50 CAPSULE ORAL at 12:06

## 2024-06-21 RX ADMIN — ENOXAPARIN SODIUM 100 MILLIGRAM(S): 100 INJECTION SUBCUTANEOUS at 17:45

## 2024-06-21 RX ADMIN — INSULIN LISPRO 4: 100 INJECTION, SOLUTION SUBCUTANEOUS at 12:07

## 2024-06-21 RX ADMIN — Medication 1 APPLICATION(S): at 17:48

## 2024-06-21 RX ADMIN — DILTIAZEM HYDROCHLORIDE 120 MILLIGRAM(S): 240 CAPSULE, EXTENDED RELEASE ORAL at 06:01

## 2024-06-21 RX ADMIN — Medication 1 APPLICATION(S): at 12:06

## 2024-06-21 RX ADMIN — Medication 1 SPRAY(S): at 13:01

## 2024-06-21 RX ADMIN — Medication 100 MILLIGRAM(S): at 06:01

## 2024-06-21 RX ADMIN — OXYCODONE HYDROCHLORIDE 10 MILLIGRAM(S): 100 SOLUTION ORAL at 06:01

## 2024-06-21 RX ADMIN — ASPIRIN 81 MILLIGRAM(S): 325 TABLET, FILM COATED ORAL at 12:06

## 2024-06-21 RX ADMIN — Medication 50 MILLIGRAM(S): at 22:30

## 2024-06-21 RX ADMIN — Medication 25 MILLIGRAM(S): at 09:16

## 2024-06-21 RX ADMIN — Medication 20 MILLIGRAM(S): at 06:01

## 2024-06-21 RX ADMIN — OXYCODONE HYDROCHLORIDE 10 MILLIGRAM(S): 100 SOLUTION ORAL at 22:30

## 2024-06-21 RX ADMIN — HYDROXYZINE PAMOATE 25 MILLIGRAM(S): 50 CAPSULE ORAL at 22:31

## 2024-06-21 RX ADMIN — OXYCODONE HYDROCHLORIDE 10 MILLIGRAM(S): 100 SOLUTION ORAL at 06:34

## 2024-06-21 RX ADMIN — Medication 20 MILLIGRAM(S): at 17:46

## 2024-06-21 RX ADMIN — VANCOMYCIN HYDROCHLORIDE 250 MILLIGRAM(S): KIT at 08:41

## 2024-06-21 RX ADMIN — ATORVASTATIN CALCIUM 40 MILLIGRAM(S): 20 TABLET, FILM COATED ORAL at 22:30

## 2024-06-21 RX ADMIN — ENOXAPARIN SODIUM 100 MILLIGRAM(S): 100 INJECTION SUBCUTANEOUS at 06:00

## 2024-06-21 RX ADMIN — Medication 50 MILLIGRAM(S): at 00:32

## 2024-06-21 RX ADMIN — Medication 25 MILLIGRAM(S): at 22:29

## 2024-06-21 RX ADMIN — Medication 50 MILLIGRAM(S): at 06:01

## 2024-06-21 RX ADMIN — CLOBETASOL PROPIONATE 1 APPLICATION(S): 0.5 CREAM TOPICAL at 18:25

## 2024-06-21 RX ADMIN — Medication 1 APPLICATION(S): at 06:02

## 2024-06-21 NOTE — PROGRESS NOTE ADULT - SUBJECTIVE AND OBJECTIVE BOX
Patient is a 53y old  Male who presents with a chief complaint of Acute respiratory failure with hypoxia (17 Jun 2024 10:29)  Patient seen in follow up for OBI.        PAST MEDICAL HISTORY:  No pertinent past medical history    Diabetes    No pertinent past medical history    Diabetes mellitus with no complication    Afib    Hypertension    BPH (benign prostatic hyperplasia)    Perforated gastric ulcer    Pulmonary embolism    History of non-ST elevation myocardial infarction (NSTEMI)    Osteomyelitis    CAD S/P percutaneous coronary angioplasty    Cerebrovascular accident      MEDICATIONS  (STANDING):  aspirin enteric coated 81 milliGRAM(s) Oral daily  atorvastatin 40 milliGRAM(s) Oral at bedtime  benzocaine 20% Spray 1 Spray(s) Topical every 6 hours  calamine/zinc oxide Lotion 1 Application(s) Topical two times a day  chlorhexidine 2% Cloths 1 Application(s) Topical daily  clobetasol 0.05% Cream 1 Application(s) Topical two times a day  dextrose 10% Bolus 125 milliLiter(s) IV Bolus once  dextrose 5%. 1000 milliLiter(s) (50 mL/Hr) IV Continuous <Continuous>  dextrose 5%. 1000 milliLiter(s) (100 mL/Hr) IV Continuous <Continuous>  dextrose 50% Injectable 25 Gram(s) IV Push once  dextrose 50% Injectable 12.5 Gram(s) IV Push once  diltiazem    Tablet 120 milliGRAM(s) Oral every 8 hours  enoxaparin Injectable 100 milliGRAM(s) SubCutaneous every 12 hours  famotidine    Tablet 20 milliGRAM(s) Oral two times a day  gabapentin 100 milliGRAM(s) Oral every 12 hours  glucagon  Injectable 1 milliGRAM(s) IntraMuscular once  hydrOXYzine hydrochloride 25 milliGRAM(s) Oral daily  insulin lispro (ADMELOG) corrective regimen sliding scale   SubCutaneous three times a day before meals  metoprolol tartrate 50 milliGRAM(s) Oral every 6 hours  sodium chloride 0.45%. 1000 milliLiter(s) (50 mL/Hr) IV Continuous <Continuous>  tiotropium 2.5 MICROgram(s)/olodaterol 2.5 MICROgram(s) (STIOLTO) Inhaler 2 Puff(s) Inhalation daily  vancomycin  IVPB 1000 milliGRAM(s) IV Intermittent every 12 hours    MEDICATIONS  (PRN):  acetaminophen     Tablet .. 650 milliGRAM(s) Oral every 6 hours PRN Mild Pain (1 - 3)  diphenhydrAMINE 25 milliGRAM(s) Oral every 4 hours PRN Rash and/or Itching  metoprolol tartrate Injectable 5 milliGRAM(s) IV Push every 6 hours PRN HR >140  oxyCODONE    IR 5 milliGRAM(s) Oral every 6 hours PRN Moderate Pain (4 - 6)  oxyCODONE    IR 10 milliGRAM(s) Oral every 6 hours PRN Severe Pain (7 - 10)  sodium chloride 0.9% lock flush 10 milliLiter(s) IV Push every 1 hour PRN Pre/post blood products, medications, blood draw, and to maintain line patency    T(C): 36.9 (06-21-24 @ 05:02), Max: 37.2 (06-20-24 @ 12:20)  HR: 85 (06-21-24 @ 05:02) (83 - 138)  BP: 145/78 (06-21-24 @ 05:02) (104/69 - 145/78)  RR: 18 (06-21-24 @ 05:02)  SpO2: 90% (06-21-24 @ 05:02)  Wt(kg): --  I&O's Detail    20 Jun 2024 07:01  -  21 Jun 2024 07:00  --------------------------------------------------------  IN:  Total IN: 0 mL    OUT:    Indwelling Catheter - Urethral (mL): 3600 mL  Total OUT: 3600 mL    Total NET: -3600 mL                  PHYSICAL EXAM:  General: No distress  Respiratory: b/l air entry  Cardiovascular: S1 S2  Gastrointestinal: soft  Extremities:  Left TMA, Chronic skin changes           LABORATORY:                        10.3   7.83  )-----------( 73       ( 20 Jun 2024 12:20 )             32.7     06-20    140  |  110<H>  |  10  ----------------------------<  172<H>  3.4<L>   |  24  |  1.10    Ca    8.1<L>      20 Jun 2024 12:20  Phos  2.5     06-20  Mg     1.7     06-20    TPro  5.3<L>  /  Alb  2.0<L>  /  TBili  0.4  /  DBili  x   /  AST  28  /  ALT  92<H>  /  AlkPhos  111  06-20    Sodium: 140 mmol/L (06-20 @ 12:20)    Potassium: 3.4 mmol/L (06-20 @ 12:20)    Hemoglobin: 10.3 g/dL (06-20 @ 12:20)  Hemoglobin: 11.2 g/dL (06-19 @ 06:15)    Creatinine, Serum 1.10 (06-20 @ 12:20)  Creatinine, Serum 1.00 (06-19 @ 06:15)        LIVER FUNCTIONS - ( 20 Jun 2024 12:20 )  Alb: 2.0 g/dL / Pro: 5.3 g/dL / ALK PHOS: 111 U/L / ALT: 92 U/L / AST: 28 U/L / GGT: x           Urinalysis Basic - ( 20 Jun 2024 12:20 )    Color: x / Appearance: x / SG: x / pH: x  Gluc: 172 mg/dL / Ketone: x  / Bili: x / Urobili: x   Blood: x / Protein: x / Nitrite: x   Leuk Esterase: x / RBC: x / WBC x   Sq Epi: x / Non Sq Epi: x / Bacteria: x

## 2024-06-21 NOTE — PROVIDER CONTACT NOTE (CRITICAL VALUE NOTIFICATION) - DATE AND TIME:
21-Jun-2024 10:42 21-Jun-2024 11:11 55 y/o obese female, current smoker, with PMHx of HTN, DM-2 and GERD presents to [facility] ER 1/25/18, complaining of intermittent, non exertional, mid sternal pressure radiating down left arm, with hand numbness/tingling associated with SOB X1 week.  According to patient, she has been experiencing intermittent, exertional chest pressure with lifting bags or walking a 4-5 blocks w/SOB for one month but has ignored her symptoms.  Last week symptoms are now occurring intermittently at rest with chest pressure now radiating down left arm with hand numbness and tingling sensation.  Pt. states today she was driving to work when on the car radio a physician was talking about symptoms to watch out for MI.  Pt. was nervous since she has been experiencing same symptoms and called her PMD who then advised her to go ER for evaluation.  Pt. reports last stress test was done one year ago because she needed cardiac clearance for gastric sleeve and results were normal.  However, surgery was cancelled because patient continue to smoke making her high risk for surgery.  In ER ECG reveals NSR@67bpm with non specific ST-T wave changes, CE neg X1, CXR reveals no acute pathology f/u official report. Pt. was loaded with Aspirin Ecand Brilinta in case pt. need to go for Cath (depending on Troponin) Pt. admitted to [facility] w/Unstable Angina . During hospital course CE negative x 2 (patient refused AM blood draw). Echocardiogram 1/26 revealed mild concentric LVH, normal wall motion, EF 55-60%, no hemodynamically significant AS, no MR, no aortic root dilatation, Heterogenous   Space anterior to the heart; likely prominent epicardial fat, cannot rule out   Small pericardial effusion.  CT Coronaries 1/26 : 1.  The calcium score is moderately elevated at 176 Agatston units, which   is at the 98 percentile, adjusted for age, gender and race.2.  Focal dense calcific plaque obscures lumen of distal LAD at cardiac apex (stenosis severity indeterminate)3.  Nonobstructive plaque is noted in the proximal and mid LAD, the proximal LCx and the entire RCA.4.  Probable nonobstructive plaque in D1 and OM2 5.  Probably normal mid LCx and small D2. Lipid Panel unavailable as patient refused AM labs. Smoking cessation counseling provided and patient instructed to take baby ASA daily. Patient C/P free and HD stable and cleared for discharge by Dr. Santiago.     V/S:	BP: 100-113/50-60s		HR: 70s 	RR: 18 	Temp:  98.5 F  			O2 sat-95% RA  	  GENERAL: Sitting in bed in no acute distress  CVS: + S1 S2. RRR. No murmurs, rubs or gallops.   Pulm: Rhonchi. No rales or wheezes.   Abd: Obese. BS x 4. Soft NT/ND.  Ext: No clubbing, cyanosis, or edema BLE. No calf tenderness BLE.

## 2024-06-21 NOTE — OCCUPATIONAL THERAPY INITIAL EVALUATION ADULT - PERTINENT HX OF CURRENT PROBLEM, REHAB EVAL
Patient is a 53y old  Male who presents with a chief complaint of Acute respiratory failure with hypoxia (20 Jun 2024 13:50)

## 2024-06-21 NOTE — PROGRESS NOTE ADULT - SUBJECTIVE AND OBJECTIVE BOX
[facility] -- Brittany Lutz Pannella, Patel, Savella, Goodger: Office # [phone]    Follow Up:  Acute respiratory failure with hypoxia    Subjective/Observations: Patient seen and examined. Patient alert awake, Denies chest pain palpitation dizziness, denies difficulty breathing , no orthopnea or PND noted. Tolerating room air      REVIEW OF SYSTEMS: All other review of systems are negative unless indicated above    PAST MEDICAL & SURGICAL HISTORY:  Diabetes      Diabetes mellitus with no complication      Afib      Hypertension      BPH (benign prostatic hyperplasia)      Perforated gastric ulcer  s/p emergent ex-lap omentopexy and plication 6/2019      Pulmonary embolism      History of non-ST elevation myocardial infarction (NSTEMI)      Osteomyelitis  s/p debridement      CAD S/P percutaneous coronary angioplasty      Cerebrovascular accident      H/O abdominal surgery      Perforated gastric ulcer      Traumatic amputation of left foot, initial encounter          MEDICATIONS  (STANDING):  aspirin enteric coated 81 milliGRAM(s) Oral daily  atorvastatin 40 milliGRAM(s) Oral at bedtime  benzocaine 20% Spray 1 Spray(s) Topical every 6 hours  calamine/zinc oxide Lotion 1 Application(s) Topical two times a day  chlorhexidine 2% Cloths 1 Application(s) Topical daily  clobetasol 0.05% Cream 1 Application(s) Topical two times a day  dextrose 10% Bolus 125 milliLiter(s) IV Bolus once  dextrose 5%. 1000 milliLiter(s) (50 mL/Hr) IV Continuous <Continuous>  dextrose 5%. 1000 milliLiter(s) (100 mL/Hr) IV Continuous <Continuous>  dextrose 50% Injectable 25 Gram(s) IV Push once  dextrose 50% Injectable 12.5 Gram(s) IV Push once  diltiazem    Tablet 120 milliGRAM(s) Oral every 8 hours  enoxaparin Injectable 100 milliGRAM(s) SubCutaneous every 12 hours  famotidine    Tablet 20 milliGRAM(s) Oral two times a day  gabapentin 100 milliGRAM(s) Oral every 12 hours  glucagon  Injectable 1 milliGRAM(s) IntraMuscular once  hydrOXYzine hydrochloride 25 milliGRAM(s) Oral daily  insulin lispro (ADMELOG) corrective regimen sliding scale   SubCutaneous three times a day before meals  metoprolol tartrate 50 milliGRAM(s) Oral every 6 hours  sodium chloride 0.45%. 1000 milliLiter(s) (50 mL/Hr) IV Continuous <Continuous>  tiotropium 2.5 MICROgram(s)/olodaterol 2.5 MICROgram(s) (STIOLTO) Inhaler 2 Puff(s) Inhalation daily  vancomycin  IVPB 1000 milliGRAM(s) IV Intermittent every 12 hours    MEDICATIONS  (PRN):  acetaminophen     Tablet .. 650 milliGRAM(s) Oral every 6 hours PRN Mild Pain (1 - 3)  diphenhydrAMINE 25 milliGRAM(s) Oral every 4 hours PRN Rash and/or Itching  metoprolol tartrate Injectable 5 milliGRAM(s) IV Push every 6 hours PRN HR >140  oxyCODONE    IR 10 milliGRAM(s) Oral every 6 hours PRN Severe Pain (7 - 10)  oxyCODONE    IR 5 milliGRAM(s) Oral every 6 hours PRN Moderate Pain (4 - 6)  sodium chloride 0.9% lock flush 10 milliLiter(s) IV Push every 1 hour PRN Pre/post blood products, medications, blood draw, and to maintain line patency    Allergies    ertapenem (Blisters; Rash)  fish (Hives)    Intolerances      Vital Signs Last 24 Hrs  T(C): 36.9 (21 Jun 2024 05:02), Max: 37.2 (20 Jun 2024 12:20)  T(F): 98.4 (21 Jun 2024 05:02), Max: 99 (20 Jun 2024 12:20)  HR: 85 (21 Jun 2024 05:02) (85 - 107)  BP: 145/78 (21 Jun 2024 05:02) (105/84 - 145/78)  BP(mean): --  RR: 18 (21 Jun 2024 05:02) (18 - 18)  SpO2: 90% (21 Jun 2024 05:02) (90% - 94%)    Parameters below as of 21 Jun 2024 05:02  Patient On (Oxygen Delivery Method): room air      I&O's Summary    20 Jun 2024 07:01  -  21 Jun 2024 07:00  --------------------------------------------------------  IN: 0 mL / OUT: 3600 mL / NET: -3600 mL          TELE: atrial fibrillation 80-90  PHYSICAL EXAM:  Constitutional: NAD, awake and alert,   HEENT: Moist Mucous Membranes, Anicteric  Pulmonary: Non-labored, breath sounds are clear bilaterally, No wheezing, rales or rhonchi  Cardiovascular: Regular, S1 and S2, No murmurs, No rubs, gallops or clicks  Gastrointestinal:  soft, nontender, nondistended   Lymph: + peripheral edema.  R leg dsg intact  Skin: + rashes   Psych:  Mood & affect appropriate      LABS: All Labs Reviewed:                        10.3   7.83  )-----------( 73       ( 20 Jun 2024 12:20 )             32.7                         11.2   12.09 )-----------( 191      ( 19 Jun 2024 06:15 )             35.2     20 Jun 2024 12:20    140    |  110    |  10     ----------------------------<  172    3.4     |  24     |  1.10   19 Jun 2024 06:15    144    |  111    |  12     ----------------------------<  146    3.3     |  24     |  1.00     Ca    8.1        20 Jun 2024 12:20  Ca    8.2        19 Jun 2024 06:15  Phos  2.5       20 Jun 2024 12:20  Phos  2.4       19 Jun 2024 06:15  Mg     1.7       20 Jun 2024 12:20  Mg     1.8       19 Jun 2024 06:15    TPro  5.3    /  Alb  2.0    /  TBili  0.4    /  DBili  x      /  AST  28     /  ALT  92     /  AlkPhos  111    20 Jun 2024 12:20  TPro  5.9    /  Alb  2.2    /  TBili  0.5    /  DBili  x      /  AST  29     /  ALT  153    /  AlkPhos  124    19 Jun 2024 06:15   LIVER FUNCTIONS - ( 20 Jun 2024 12:20 )  Alb: 2.0 g/dL / Pro: 5.3 g/dL / ALK PHOS: 111 U/L / ALT: 92 U/L / AST: 28 U/L / GGT: x           Troponin I, High Sensitivity Result: 110.0 ng/L (06-14-24 @ 16:30)  Creatine Kinase, Serum: 35 U/L (06-14-24 @ 10:05)  Troponin I, High Sensitivity Result: 114.3 ng/L (06-14-24 @ 10:05)  Troponin I, High Sensitivity Result: 32.7 ng/L (06-13-24 @ 23:55)  Triiodothyronine, Total (T3 Total): 120 ng/dL (06-14-24 @ 01:25)    12 Lead ECG:   Ventricular Rate 126 BPM    QRS Duration 76 ms    Q-T Interval 302 ms    QTC Calculation(Bazett) 437 ms    R Axis -12 degrees    T Axis 103 degrees    Diagnosis Line Atrial fibrillation with rapid ventricular response  Low voltage QRS  Nonspecific ST and T wave abnormality  Abnormal ECG  When compared with ECG of 14-JUN-2024 11:03,  Questionable change in QRS axis  Nonspecific T wave abnormality now evident in Inferior leads  Nonspecific T wave abnormality, worse in Anterolateral leads  Confirmed by Juventino Soto MD ([id number]) on 6/20/2024 12:52:09 PM (06-20-24 @ 11:37)      TRANSTHORACIC ECHOCARDIOGRAM REPORT  ________________________________________________________________________________                                      _______       Pt. Name:       SARAH MORRISON Study Date:    6/14/2024  MRN:            [id number]         YOB: 1970  Accession #:    [id number]        Age:           53 years  Account#:       [id number]       Gender:        M  Visit ID#  Heart Rate:                      Height:        72.05 in (183.00 cm)  Rhythm:          Weight:        222.66 lb (101.00 kg)  Blood Pressure: 88/53 mmHg       BSA/BMI:       2.23 m² / 30.16 kg/m²  ________________________________________________________________________________________  Referring Physician:    [id number] Hill Brizuela  Interpreting Physician: Rahel Boss MD  Primary Sonographer:    Mounika FELDMAN    CPT:               ECHO TTE WO CON COMP W DOPP - 89494.m  Indication(s):     Heart failure, unspecified - I50.9  Procedure:         Transthoracic echocardiogram with 2-D, M-mode and complete                     spectral and color flow Doppler.  Ordering Location: ICU1  Admission Status:  Inpatient    _______________________________________________________________________________________     CONCLUSIONS:      1. Left ventricular cavity is normal in size. Left ventricular systolic function is normal with an ejection fraction visually estimated at 55 to 60 %. There are no regional wall motion abnormalities seen.   2. Moderate to severe left ventricular hypertrophy.   3. Normal right ventricular cavity size, with normal wall thickness, and normal systolic function.   4. The left atrium is severely dilated.   5. The right atrium is severely dilated.   6. Trileaflet aortic valve with normal systolic excursion. There is focal calcification of the aortic valve leaflets.   7. Mild to moderate mitral regurgitation.   8. There is moderate calcification of the mitral valve annulus.   9. Structurally normal tricuspid valve with normal leaflet excursion.Mild tricuspid regurgitation.  10. The inferior vena cava is normal in size measuring 1.70 cm in diameter, (normal <2.1cm) with normal inspiratory collapse (normal >50%) consistent with normal right atrial pressure (~3, range 0-5mmHg).  11. Estimatedpulmonary artery systolic pressure is 43 mmHg.    ________________________________________________________________________________________  FINDINGS:     Left Ventricle:  The left ventricular cavity is normal in size. Left ventricular systolic function is normal with an ejection fraction visually estimated at 55 to 60%. There are no regional wall motion abnormalities seen. There is normal left ventricular diastolic function, with normal filling pressure. Moderate to severe left ventricular hypertrophy.     Right Ventricle:  The right ventricular cavity is normal in size, with normal wall thickness and normal systolic function.     Left Atrium:  The left atrium is severely dilated.     Right Atrium:  The right atrium is severely dilated.     Aortic Valve:  The aortic valve appears trileaflet with normal systolic excursion. There is focal calcification of the aortic valve leaflets. There is no aortic valve stenosis. There is no evidence of aortic regurgitation.     Mitral Valve:  There is moderate calcification of the mitral valve annulus. There is mild to moderate mitral regurgitation.     Tricuspid Valve:  Structurally normal tricuspid valve with normal leaflet excursion. There is mild tricuspid regurgitation. Estimated pulmonary artery systolic pressure is 43 mmHg.     Pulmonic Valve:  The pulmonic valve was not well visualized.     Systemic Veins:  The inferior vena cava is normal in size measuring 1.70 cm in diameter, (normal <2.1cm) with normal inspiratory collapse (normal >50%) consistent with normal right atrial pressure (~3, range 0-5mmHg).  ____________________________________________________________________  QUANTITATIVE DATA:  Left Ventricle Measurements: (Indexed to BSA)     IVSd (2D):   1.7 cm  LVPWd (2D):  1.4 cm  LVIDd (2D):  4.3 cm  LVIDs (2D):  3.0 cm  LV Mass:     281 g  125.9 g/m²  Visualized LV EF%: 55 to 60%     MV E Vmax:    1.07 m/s  MV A Vmax:    0.00 m/s  e' lateral:   9.68 cm/s  e' medial:    10.90 cm/s  E/e' lateral: 11.05  E/e' medial:  9.82  E/e' Average: 10.40  MV DT:        180 msec    Aorta Measurements: (Normal range) (Indexed to BSA)     Sinuses of Valsalva: 3.40 cm (3.1 - 3.7 cm)       Left Atrium Measurements: (Indexed to BSA)  LA Diam 2D: 4.00 cm       LVOT / RVOT/ Qp/Qs Data: (Indexed to BSA)  LVOT Diameter: 2.10 cm  LVOT Area:     3.46 cm²    Mitral Valve Measurements:     MV E Vmax: 1.1 m/s  MV A Vmax: 0.0 m/s       Tricuspid Valve Measurements:     TR Vmax:          3.2 m/s  TR Peak Gradient: 40.4 mmHg  RA Pressure:      3 mmHg  PASP:             43 mmHg    ________________________________________________________________________________________  Electronically signed on 6/14/2024 at 12:08:59 PM by Rahel Boss MD         *** Final ***   [facility] -- Brittany Lutz Pannella, Patel, Savella, Goodger: Office # [phone]    Follow Up:  Acute respiratory failure with hypoxia    Subjective/Observations: Patient seen and examined. Patient alert awake, Denies chest pain palpitation dizziness, denies difficulty breathing , no orthopnea or PND noted. Tolerating room air      REVIEW OF SYSTEMS: All other review of systems are negative unless indicated above    PAST MEDICAL & SURGICAL HISTORY:  Diabetes      Diabetes mellitus with no complication      Afib      Hypertension      BPH (benign prostatic hyperplasia)      Perforated gastric ulcer  s/p emergent ex-lap omentopexy and plication 6/2019      Pulmonary embolism      History of non-ST elevation myocardial infarction (NSTEMI)      Osteomyelitis  s/p debridement      CAD S/P percutaneous coronary angioplasty      Cerebrovascular accident      H/O abdominal surgery      Perforated gastric ulcer      Traumatic amputation of left foot, initial encounter          MEDICATIONS  (STANDING):  aspirin enteric coated 81 milliGRAM(s) Oral daily  atorvastatin 40 milliGRAM(s) Oral at bedtime  benzocaine 20% Spray 1 Spray(s) Topical every 6 hours  calamine/zinc oxide Lotion 1 Application(s) Topical two times a day  chlorhexidine 2% Cloths 1 Application(s) Topical daily  clobetasol 0.05% Cream 1 Application(s) Topical two times a day  dextrose 10% Bolus 125 milliLiter(s) IV Bolus once  dextrose 5%. 1000 milliLiter(s) (50 mL/Hr) IV Continuous <Continuous>  dextrose 5%. 1000 milliLiter(s) (100 mL/Hr) IV Continuous <Continuous>  dextrose 50% Injectable 25 Gram(s) IV Push once  dextrose 50% Injectable 12.5 Gram(s) IV Push once  diltiazem    Tablet 120 milliGRAM(s) Oral every 8 hours  enoxaparin Injectable 100 milliGRAM(s) SubCutaneous every 12 hours  famotidine    Tablet 20 milliGRAM(s) Oral two times a day  gabapentin 100 milliGRAM(s) Oral every 12 hours  glucagon  Injectable 1 milliGRAM(s) IntraMuscular once  hydrOXYzine hydrochloride 25 milliGRAM(s) Oral daily  insulin lispro (ADMELOG) corrective regimen sliding scale   SubCutaneous three times a day before meals  metoprolol tartrate 50 milliGRAM(s) Oral every 6 hours  sodium chloride 0.45%. 1000 milliLiter(s) (50 mL/Hr) IV Continuous <Continuous>  tiotropium 2.5 MICROgram(s)/olodaterol 2.5 MICROgram(s) (STIOLTO) Inhaler 2 Puff(s) Inhalation daily  vancomycin  IVPB 1000 milliGRAM(s) IV Intermittent every 12 hours    MEDICATIONS  (PRN):  acetaminophen     Tablet .. 650 milliGRAM(s) Oral every 6 hours PRN Mild Pain (1 - 3)  diphenhydrAMINE 25 milliGRAM(s) Oral every 4 hours PRN Rash and/or Itching  metoprolol tartrate Injectable 5 milliGRAM(s) IV Push every 6 hours PRN HR >140  oxyCODONE    IR 10 milliGRAM(s) Oral every 6 hours PRN Severe Pain (7 - 10)  oxyCODONE    IR 5 milliGRAM(s) Oral every 6 hours PRN Moderate Pain (4 - 6)  sodium chloride 0.9% lock flush 10 milliLiter(s) IV Push every 1 hour PRN Pre/post blood products, medications, blood draw, and to maintain line patency    Allergies    ertapenem (Blisters; Rash)  fish (Hives)    Intolerances      Vital Signs Last 24 Hrs  T(C): 36.9 (21 Jun 2024 05:02), Max: 37.2 (20 Jun 2024 12:20)  T(F): 98.4 (21 Jun 2024 05:02), Max: 99 (20 Jun 2024 12:20)  HR: 85 (21 Jun 2024 05:02) (85 - 107)  BP: 145/78 (21 Jun 2024 05:02) (105/84 - 145/78)  BP(mean): --  RR: 18 (21 Jun 2024 05:02) (18 - 18)  SpO2: 90% (21 Jun 2024 05:02) (90% - 94%)    Parameters below as of 21 Jun 2024 05:02  Patient On (Oxygen Delivery Method): room air      I&O's Summary    20 Jun 2024 07:01  -  21 Jun 2024 07:00  --------------------------------------------------------  IN: 0 mL / OUT: 3600 mL / NET: -3600 mL          TELE: atrial fibrillation   PHYSICAL EXAM:  Constitutional: NAD, awake and alert,   HEENT: Moist Mucous Membranes, Anicteric  Pulmonary: Non-labored, breath sounds are clear bilaterally, No wheezing, rales or rhonchi  Cardiovascular: Regular, S1 and S2, No murmurs, No rubs, gallops or clicks  Gastrointestinal:  soft, nontender, nondistended   Lymph: + peripheral edema.  R leg dsg intact  Skin: + rashes   Psych:  Mood & affect appropriate      LABS: All Labs Reviewed:                        10.3   7.83  )-----------( 73       ( 20 Jun 2024 12:20 )             32.7                         11.2   12.09 )-----------( 191      ( 19 Jun 2024 06:15 )             35.2     20 Jun 2024 12:20    140    |  110    |  10     ----------------------------<  172    3.4     |  24     |  1.10   19 Jun 2024 06:15    144    |  111    |  12     ----------------------------<  146    3.3     |  24     |  1.00     Ca    8.1        20 Jun 2024 12:20  Ca    8.2        19 Jun 2024 06:15  Phos  2.5       20 Jun 2024 12:20  Phos  2.4       19 Jun 2024 06:15  Mg     1.7       20 Jun 2024 12:20  Mg     1.8       19 Jun 2024 06:15    TPro  5.3    /  Alb  2.0    /  TBili  0.4    /  DBili  x      /  AST  28     /  ALT  92     /  AlkPhos  111    20 Jun 2024 12:20  TPro  5.9    /  Alb  2.2    /  TBili  0.5    /  DBili  x      /  AST  29     /  ALT  153    /  AlkPhos  124    19 Jun 2024 06:15   LIVER FUNCTIONS - ( 20 Jun 2024 12:20 )  Alb: 2.0 g/dL / Pro: 5.3 g/dL / ALK PHOS: 111 U/L / ALT: 92 U/L / AST: 28 U/L / GGT: x           Troponin I, High Sensitivity Result: 110.0 ng/L (06-14-24 @ 16:30)  Creatine Kinase, Serum: 35 U/L (06-14-24 @ 10:05)  Troponin I, High Sensitivity Result: 114.3 ng/L (06-14-24 @ 10:05)  Troponin I, High Sensitivity Result: 32.7 ng/L (06-13-24 @ 23:55)  Triiodothyronine, Total (T3 Total): 120 ng/dL (06-14-24 @ 01:25)    12 Lead ECG:   Ventricular Rate 126 BPM    QRS Duration 76 ms    Q-T Interval 302 ms    QTC Calculation(Bazett) 437 ms    R Axis -12 degrees    T Axis 103 degrees    Diagnosis Line Atrial fibrillation with rapid ventricular response  Low voltage QRS  Nonspecific ST and T wave abnormality  Abnormal ECG  When compared with ECG of 14-JUN-2024 11:03,  Questionable change in QRS axis  Nonspecific T wave abnormality now evident in Inferior leads  Nonspecific T wave abnormality, worse in Anterolateral leads  Confirmed by Juventino Soto MD ([id number]) on 6/20/2024 12:52:09 PM (06-20-24 @ 11:37)      TRANSTHORACIC ECHOCARDIOGRAM REPORT  ________________________________________________________________________________                                      _______       Pt. Name:       SARAH MORRISON Study Date:    6/14/2024  MRN:            [id number]         YOB: 1970  Accession #:    [id number]        Age:           53 years  Account#:       [id number]       Gender:        M  Visit ID#  Heart Rate:                      Height:        72.05 in (183.00 cm)  Rhythm:          Weight:        222.66 lb (101.00 kg)  Blood Pressure: 88/53 mmHg       BSA/BMI:       2.23 m² / 30.16 kg/m²  ________________________________________________________________________________________  Referring Physician:    [id number] Hill Brizuela  Interpreting Physician: Rahel Boss MD  Primary Sonographer:    Mounika FELDMAN    CPT:               ECHO TTE WO CON COMP W DOPP - 66408.m  Indication(s):     Heart failure, unspecified - I50.9  Procedure:         Transthoracic echocardiogram with 2-D, M-mode and complete                     spectral and color flow Doppler.  Ordering Location: ICU1  Admission Status:  Inpatient    _______________________________________________________________________________________     CONCLUSIONS:      1. Left ventricular cavity is normal in size. Left ventricular systolic function is normal with an ejection fraction visually estimated at 55 to 60 %. There are no regional wall motion abnormalities seen.   2. Moderate to severe left ventricular hypertrophy.   3. Normal right ventricular cavity size, with normal wall thickness, and normal systolic function.   4. The left atrium is severely dilated.   5. The right atrium is severely dilated.   6. Trileaflet aortic valve with normal systolic excursion. There is focal calcification of the aortic valve leaflets.   7. Mild to moderate mitral regurgitation.   8. There is moderate calcification of the mitral valve annulus.   9. Structurally normal tricuspid valve with normal leaflet excursion.Mild tricuspid regurgitation.  10. The inferior vena cava is normal in size measuring 1.70 cm in diameter, (normal <2.1cm) with normal inspiratory collapse (normal >50%) consistent with normal right atrial pressure (~3, range 0-5mmHg).  11. Estimatedpulmonary artery systolic pressure is 43 mmHg.    ________________________________________________________________________________________  FINDINGS:     Left Ventricle:  The left ventricular cavity is normal in size. Left ventricular systolic function is normal with an ejection fraction visually estimated at 55 to 60%. There are no regional wall motion abnormalities seen. There is normal left ventricular diastolic function, with normal filling pressure. Moderate to severe left ventricular hypertrophy.     Right Ventricle:  The right ventricular cavity is normal in size, with normal wall thickness and normal systolic function.     Left Atrium:  The left atrium is severely dilated.     Right Atrium:  The right atrium is severely dilated.     Aortic Valve:  The aortic valve appears trileaflet with normal systolic excursion. There is focal calcification of the aortic valve leaflets. There is no aortic valve stenosis. There is no evidence of aortic regurgitation.     Mitral Valve:  There is moderate calcification of the mitral valve annulus. There is mild to moderate mitral regurgitation.     Tricuspid Valve:  Structurally normal tricuspid valve with normal leaflet excursion. There is mild tricuspid regurgitation. Estimated pulmonary artery systolic pressure is 43 mmHg.     Pulmonic Valve:  The pulmonic valve was not well visualized.     Systemic Veins:  The inferior vena cava is normal in size measuring 1.70 cm in diameter, (normal <2.1cm) with normal inspiratory collapse (normal >50%) consistent with normal right atrial pressure (~3, range 0-5mmHg).  ____________________________________________________________________  QUANTITATIVE DATA:  Left Ventricle Measurements: (Indexed to BSA)     IVSd (2D):   1.7 cm  LVPWd (2D):  1.4 cm  LVIDd (2D):  4.3 cm  LVIDs (2D):  3.0 cm  LV Mass:     281 g  125.9 g/m²  Visualized LV EF%: 55 to 60%     MV E Vmax:    1.07 m/s  MV A Vmax:    0.00 m/s  e' lateral:   9.68 cm/s  e' medial:    10.90 cm/s  E/e' lateral: 11.05  E/e' medial:  9.82  E/e' Average: 10.40  MV DT:        180 msec    Aorta Measurements: (Normal range) (Indexed to BSA)     Sinuses of Valsalva: 3.40 cm (3.1 - 3.7 cm)       Left Atrium Measurements: (Indexed to BSA)  LA Diam 2D: 4.00 cm       LVOT / RVOT/ Qp/Qs Data: (Indexed to BSA)  LVOT Diameter: 2.10 cm  LVOT Area:     3.46 cm²    Mitral Valve Measurements:     MV E Vmax: 1.1 m/s  MV A Vmax: 0.0 m/s       Tricuspid Valve Measurements:     TR Vmax:          3.2 m/s  TR Peak Gradient: 40.4 mmHg  RA Pressure:      3 mmHg  PASP:             43 mmHg    ________________________________________________________________________________________  Electronically signed on 6/14/2024 at 12:08:59 PM by Rahel Boss MD         *** Final ***

## 2024-06-21 NOTE — PROGRESS NOTE ADULT - SUBJECTIVE AND OBJECTIVE BOX
[facility] DIVISION of INFECTIOUS DISEASE  Juventino Whitten MD PhD, Symone Hickey MD, Anne-Marie Li MD, Jeffery Jacobs MD, Ryan Romeo MD  and providing coverage with Melody Armstrong MD  Providing Infectious Disease Consultations at [facility], [facility], [facility]'s    Office# [phone] to schedule follow up appointments  Answering Service for urgent calls or New Consults [phone]  Cell# to text for urgent issues Juventino Whitten [phone]     infectious diseases progress note:    SARAH MORRISON is a 53y y. o. Male patient    Overnight and events of the last 24hrs reviewed    Allergies    ertapenem (Blisters; Rash)  fish (Hives)    Intolerances        ANTIBIOTICS/RELEVANT:  antimicrobials  vancomycin  IVPB 1000 milliGRAM(s) IV Intermittent every 12 hours    immunologic:    OTHER:  acetaminophen     Tablet .. 650 milliGRAM(s) Oral every 6 hours PRN  aspirin enteric coated 81 milliGRAM(s) Oral daily  atorvastatin 40 milliGRAM(s) Oral at bedtime  benzocaine 20% Spray 1 Spray(s) Topical every 6 hours  calamine/zinc oxide Lotion 1 Application(s) Topical two times a day  chlorhexidine 2% Cloths 1 Application(s) Topical daily  clobetasol 0.05% Cream 1 Application(s) Topical two times a day  dextrose 10% Bolus 125 milliLiter(s) IV Bolus once  dextrose 5%. 1000 milliLiter(s) IV Continuous <Continuous>  dextrose 5%. 1000 milliLiter(s) IV Continuous <Continuous>  dextrose 50% Injectable 25 Gram(s) IV Push once  dextrose 50% Injectable 12.5 Gram(s) IV Push once  diltiazem    Tablet 120 milliGRAM(s) Oral every 8 hours  diphenhydrAMINE 25 milliGRAM(s) Oral every 4 hours PRN  enoxaparin Injectable 100 milliGRAM(s) SubCutaneous every 12 hours  famotidine    Tablet 20 milliGRAM(s) Oral two times a day  gabapentin 100 milliGRAM(s) Oral every 12 hours  glucagon  Injectable 1 milliGRAM(s) IntraMuscular once  hydrOXYzine hydrochloride 25 milliGRAM(s) Oral daily  insulin lispro (ADMELOG) corrective regimen sliding scale   SubCutaneous three times a day before meals  metoprolol tartrate 50 milliGRAM(s) Oral every 6 hours  metoprolol tartrate Injectable 5 milliGRAM(s) IV Push every 6 hours PRN  oxyCODONE    IR 10 milliGRAM(s) Oral every 6 hours PRN  oxyCODONE    IR 5 milliGRAM(s) Oral every 6 hours PRN  sodium chloride 0.9% lock flush 10 milliLiter(s) IV Push every 1 hour PRN  tiotropium 2.5 MICROgram(s)/olodaterol 2.5 MICROgram(s) (STIOLTO) Inhaler 2 Puff(s) Inhalation daily      Objective:  Vital Signs Last 24 Hrs  T(C): 36.6 (21 Jun 2024 12:05), Max: 37.1 (20 Jun 2024 20:20)  T(F): 97.9 (21 Jun 2024 12:05), Max: 98.8 (20 Jun 2024 20:20)  HR: 108 (21 Jun 2024 13:06) (85 - 108)  BP: 109/67 (21 Jun 2024 13:05) (109/67 - 145/78)  BP(mean): --  RR: 18 (21 Jun 2024 12:05) (18 - 18)  SpO2: 91% (21 Jun 2024 12:05) (90% - 94%)    Parameters below as of 21 Jun 2024 12:05  Patient On (Oxygen Delivery Method): room air        T(C): 36.6 (06-21-24 @ 12:05), Max: 37.2 (06-20-24 @ 12:20)  T(C): 36.6 (06-21-24 @ 12:05), Max: 37.3 (06-18-24 @ 20:14)  T(C): 36.6 (06-21-24 @ 12:05), Max: 37.4 (06-17-24 @ 16:35)    PHYSICAL EXAM:  HEENT: NC atraumatic  Neck: supple  Respiratory: no accessory muscle use, breathing comfortably  Cardiovascular: distant  Gastrointestinal: normal appearing, nondistended  Extremities: no clubbing, no cyanosis,        LABS:                          10.3   7.83  )-----------( 73       ( 20 Jun 2024 12:20 )             32.7       WBC  7.83 06-20 @ 12:20  12.09 06-19 @ 06:15  7.12 06-18 @ 05:50  8.37 06-17 @ 05:52  9.49 06-16 @ 05:38  15.77 06-15 @ 05:40      06-20    140  |  110<H>  |  10  ----------------------------<  172<H>  3.4<L>   |  24  |  1.10    Ca    8.1<L>      20 Jun 2024 12:20  Phos  2.5     06-20  Mg     1.7     06-20    TPro  5.3<L>  /  Alb  2.0<L>  /  TBili  0.4  /  DBili  x   /  AST  28  /  ALT  92<H>  /  AlkPhos  111  06-20      Creatinine: 1.10 mg/dL (06-20-24 @ 12:20)  Creatinine: 1.00 mg/dL (06-19-24 @ 06:15)  Creatinine: 1.20 mg/dL (06-18-24 @ 05:50)  Creatinine: 1.20 mg/dL (06-17-24 @ 19:10)  Creatinine: 1.40 mg/dL (06-17-24 @ 05:52)  Creatinine: 1.90 mg/dL (06-16-24 @ 05:38)  Creatinine: 2.60 mg/dL (06-15-24 @ 05:40)        Urinalysis Basic - ( 20 Jun 2024 12:20 )    Color: x / Appearance: x / SG: x / pH: x  Gluc: 172 mg/dL / Ketone: x  / Bili: x / Urobili: x   Blood: x / Protein: x / Nitrite: x   Leuk Esterase: x / RBC: x / WBC x   Sq Epi: x / Non Sq Epi: x / Bacteria: x            INFLAMMATORY MARKERS      MICROBIOLOGY:              RADIOLOGY & ADDITIONAL STUDIES:

## 2024-06-21 NOTE — OCCUPATIONAL THERAPY INITIAL EVALUATION ADULT - ADDITIONAL COMMENTS
Pt [facility]. Pt reports he is independent in ADLs, and that since he has been NWB R LE since March, he pops over to wheelchair, commode, and shower chair independently. Pt reports he has been unable to ambulate with RW due to NWB R LE. Pt reports he sits on a shower bench during bathing.

## 2024-06-21 NOTE — OCCUPATIONAL THERAPY INITIAL EVALUATION ADULT - NS ASR WT BEARING DETAIL LLE
in surgical shoe, as per podiatry note. Surgical shoe not present in room, pt was provided with one after eval./weight-bearing as tolerated

## 2024-06-21 NOTE — OCCUPATIONAL THERAPY INITIAL EVALUATION ADULT - TRANSFER TRAINING, PT EVAL
Pt will perform commode transfer using appropriate assistive device with minimal assist in 3-5 OT sessions.

## 2024-06-21 NOTE — PHARMACOTHERAPY INTERVENTION NOTE - COMMENTS
Vancomycin Dosing Per Pharmacy:  Patient is a 54 yo male being treated for R heel wound/osteomyelitis, currently ordered for IV vancomycin. Renal function improving, patient to receive 1000 mg Q12H. Trough level 6/21 (9:30) falsely elevated at 37.5 as it was taken after dose was running. Continue with current treatment with next level due 6/21 at 8pm for close monitoring of therapeutic levels + AUC.    1. Indication for the vancomycin: R heel wound/osteomyelitis  2. Requesting Provider: Dr. Whitten  3. Current plan and dosing regimen: 1000 mg Q12H  4. Day of therapy: 18  5. Cultures: Tissue Cx 5/26/24: E faecalis + MRSA. Tissue Cx 6/4/24: ESBL E coli + E faecalis. R heel surgical swab 6/14/24: MRSA + citrobacter freundii + pseudomonas stutzeri.  6. Target trough or AUC/BAYLEE: 400-600  7. Day and time level is due: 6/21 at 21:00  8. Previous levels: 6/4 (19:31): 22, 6/5 (05:47): 14, 6/6 (20:20): 14, 6/9 (04:58): 14.4, 6/11 (04:30): 15.7, 6/13 (05:54): 17.3, 6/14 (01:25): 15.2, 6/14 (04:30): 16.1, 6/15 (5:40): 17.5, 6/16 (5:38): 15.3, 6/17 (05:52): 13.2, 6/18 (05:50): 11.8, 6/19 (06:15): 21.3, 6/20 (08:52): 21.6, 6/21 (09:30): 37.5  9. Expected 24-hour AUC: 557 Vancomycin Dosing Per Pharmacy:  Patient is a 52 yo male being treated for R heel wound/osteomyelitis, currently ordered for IV vancomycin. Renal function improving, patient to receive 1000 mg Q12H. Trough level 6/21 (9:30) falsely elevated at 37.5 as it was taken after dose was running. Continue with current treatment with next level due 6/22 at 8am for close monitoring of therapeutic levels + AUC.    1. Indication for the vancomycin: R heel wound/osteomyelitis  2. Requesting Provider: Dr. Whitten  3. Current plan and dosing regimen: 1000 mg Q12H  4. Day of therapy: 18  5. Cultures: Tissue Cx 5/26/24: E faecalis + MRSA. Tissue Cx 6/4/24: ESBL E coli + E faecalis. R heel surgical swab 6/14/24: MRSA + citrobacter freundii + pseudomonas stutzeri.  6. Target trough or AUC/BAYLEE: 400-600  7. Day and time level is due: 6/22 at 08:00  8. Previous levels: 6/4 (19:31): 22, 6/5 (05:47): 14, 6/6 (20:20): 14, 6/9 (04:58): 14.4, 6/11 (04:30): 15.7, 6/13 (05:54): 17.3, 6/14 (01:25): 15.2, 6/14 (04:30): 16.1, 6/15 (5:40): 17.5, 6/16 (5:38): 15.3, 6/17 (05:52): 13.2, 6/18 (05:50): 11.8, 6/19 (06:15): 21.3, 6/20 (08:52): 21.6, 6/21 (09:30): 37.5  9. Expected 24-hour AUC: 557

## 2024-06-21 NOTE — OCCUPATIONAL THERAPY INITIAL EVALUATION ADULT - NSACTIVITYREC_GEN_A_OT
Patient is calling because she would like to speak to nurse about her eye exam she had done and also her neuropathy.    ADL retraining, UE/LE dressing, functional transfers, UE strengthening

## 2024-06-21 NOTE — PROGRESS NOTE ADULT - ASSESSMENT
52 YO M with  AF on Eliquis, indwelling Aguilera, CAD s/p stents, CVA, DMT 2, Hypertension, osteomyelitis s/p debridement, PE, transferred from [facility] for difficulty in breathing.  His recent admission was for osteomyelitis and discharged on IV Vancomycin. Reported at [facility] he was given a dose of ertapenem and developed difficulty breathing followed by apnea. On ED arrival he was unresponsive, apneic, no palpable pulse or blood pressure, EKG rhythm HR < 20/min W/O P waves. Patient intubated,     RECOMMENDATIONS  1-Asystolic cardiac arrest, Acute hypoxic respiratory failure, Shock  - unclear etiology  -rec avoiding carbapenems and fine with just Vanco  -ICU support and monitoring    2-Osteomyeliits right calcaneous  6/4 S/p Selective debridement of wound 04-Jun-2024 10:52:57  Parviz Todd  6/4  TCx MRSA, Escherichia coli ESBL  Right calcaneus bone pathology:  -   Fragments of bone with chronic osteomyelitis.  -   Focal acute osteomyelitis cannot be ruled out.  C/w vancomycin 1 gram IV q12, goal trough 15-20, dosing per pharmacy protocol and plan had been with inability to rule out osteo rec 6 weeks so last day 7/18  PICC placed 6/7 6/21-called with elevated Vanco but appears to have been drawn while Vanco being given-being managed by pharmacy    From an ID standpoint no further requirement for inpatient status for the management of ID issues. Fine with discharge from ID standpoint when other medical issues no longer require inpatient care and social issues allow for a safe discharge plan. To schedule an outpatient ID follow up appointment please call our office at [phone]    Thank you for consulting us and involving us in the management of this most interesting and challenging case.  We will follow along in the care of this patient. Please call us at [phone] or text me directly on my cell# at [phone] with any concerns.

## 2024-06-21 NOTE — PROGRESS NOTE ADULT - SUBJECTIVE AND OBJECTIVE BOX
Date of Service 06-21-24 @ 20:10    Patient is a 53y old  Male who presents with a chief complaint of Acute respiratory failure with hypoxia (21 Jun 2024 14:49)      INTERVAL /OVERNIGHT EVENTS: feels ok    MEDICATIONS  (STANDING):  aspirin enteric coated 81 milliGRAM(s) Oral daily  atorvastatin 40 milliGRAM(s) Oral at bedtime  benzocaine 20% Spray 1 Spray(s) Topical every 6 hours  calamine/zinc oxide Lotion 1 Application(s) Topical two times a day  chlorhexidine 2% Cloths 1 Application(s) Topical daily  clobetasol 0.05% Cream 1 Application(s) Topical two times a day  dextrose 10% Bolus 125 milliLiter(s) IV Bolus once  dextrose 5%. 1000 milliLiter(s) (50 mL/Hr) IV Continuous <Continuous>  dextrose 5%. 1000 milliLiter(s) (100 mL/Hr) IV Continuous <Continuous>  dextrose 50% Injectable 25 Gram(s) IV Push once  dextrose 50% Injectable 12.5 Gram(s) IV Push once  diltiazem    Tablet 120 milliGRAM(s) Oral every 8 hours  enoxaparin Injectable 100 milliGRAM(s) SubCutaneous every 12 hours  famotidine    Tablet 20 milliGRAM(s) Oral two times a day  gabapentin 100 milliGRAM(s) Oral every 12 hours  glucagon  Injectable 1 milliGRAM(s) IntraMuscular once  hydrOXYzine hydrochloride 25 milliGRAM(s) Oral daily  insulin lispro (ADMELOG) corrective regimen sliding scale   SubCutaneous three times a day before meals  metoprolol tartrate 50 milliGRAM(s) Oral every 6 hours  tiotropium 2.5 MICROgram(s)/olodaterol 2.5 MICROgram(s) (STIOLTO) Inhaler 2 Puff(s) Inhalation daily  vancomycin  IVPB 1000 milliGRAM(s) IV Intermittent every 12 hours    MEDICATIONS  (PRN):  acetaminophen     Tablet .. 650 milliGRAM(s) Oral every 6 hours PRN Mild Pain (1 - 3)  diphenhydrAMINE 25 milliGRAM(s) Oral every 4 hours PRN Rash and/or Itching  metoprolol tartrate Injectable 5 milliGRAM(s) IV Push every 6 hours PRN HR >140  oxyCODONE    IR 5 milliGRAM(s) Oral every 6 hours PRN Moderate Pain (4 - 6)  oxyCODONE    IR 10 milliGRAM(s) Oral every 6 hours PRN Severe Pain (7 - 10)  sodium chloride 0.9% lock flush 10 milliLiter(s) IV Push every 1 hour PRN Pre/post blood products, medications, blood draw, and to maintain line patency      Allergies    ertapenem (Blisters; Rash)  fish (Hives)    Intolerances        REVIEW OF SYSTEMS:  CONSTITUTIONAL: No fever, weight loss, or fatigue  EYES: No eye pain, visual disturbances, or discharge  ENMT:  No difficulty hearing, tinnitus, vertigo; No sinus or throat pain  NECK: No pain or stiffness  RESPIRATORY: No cough, wheezing, chills or hemoptysis; No shortness of breath  CARDIOVASCULAR: No chest pain, palpitations, dizziness, or leg swelling  GASTROINTESTINAL: No abdominal or epigastric pain. No nausea, vomiting, or hematemesis; No diarrhea or constipation. No melena or hematochezia.  GENITOURINARY: No dysuria, frequency, hematuria, or incontinence  NEUROLOGICAL: No headaches, memory loss, loss of strength, numbness, or tremors  SKIN: + rashes,   LYMPH NODES: No enlarged glands  ENDOCRINE: No heat or cold intolerance; No hair loss; No polydipsia or polyuria  MUSCULOSKELETAL: No joint pain or swelling; No muscle, back, or extremity pain  PSYCHIATRIC: No depression, anxiety, mood swings, or difficulty sleeping  HEME/LYMPH: No easy bruising, or bleeding gums  ALLERGY AND IMMUNOLOGIC: No hives or eczema    Vital Signs Last 24 Hrs  T(C): 36.6 (21 Jun 2024 12:05), Max: 37.1 (20 Jun 2024 20:20)  T(F): 97.9 (21 Jun 2024 12:05), Max: 98.8 (20 Jun 2024 20:20)  HR: 77 (21 Jun 2024 18:05) (77 - 108)  BP: 109/76 (21 Jun 2024 18:05) (109/67 - 145/78)  BP(mean): --  RR: 18 (21 Jun 2024 12:05) (18 - 18)  SpO2: 91% (21 Jun 2024 12:05) (90% - 94%)    Parameters below as of 21 Jun 2024 12:05  Patient On (Oxygen Delivery Method): room air        PHYSICAL EXAM:  GENERAL: NAD, well-groomed, well-developed  HEAD:  Atraumatic, Normocephalic  EYES: EOMI, PERRLA, conjunctiva and sclera clear  ENMT: No tonsillar erythema, exudates, or enlargement; Moist mucous membranes, Good dentition, No lesions  NECK: Supple, No JVD, Normal thyroid  NERVOUS SYSTEM:  Alert & Oriented X3, Good concentration; Motor Strength 5/5 B/L upper and lower extremities; DTRs 2+ intact and symmetric  CHEST/LUNG: Clear to auscultation bilaterally; No rales, rhonchi, wheezing, or rubs  HEART: Regular rate and rhythm; No murmurs, rubs, or gallops  ABDOMEN: Soft, Nontender, Nondistended; Bowel sounds present  EXTREMITIES:  2+ Peripheral Pulses, No clubbing, cyanosis, or edema  LYMPH: No lymphadenopathy noted  SKIN: + rashes     LABS:      Ca    8.1        20 Jun 2024 12:20        Urinalysis Basic - ( 20 Jun 2024 12:20 )    Color: x / Appearance: x / SG: x / pH: x  Gluc: 172 mg/dL / Ketone: x  / Bili: x / Urobili: x   Blood: x / Protein: x / Nitrite: x   Leuk Esterase: x / RBC: x / WBC x   Sq Epi: x / Non Sq Epi: x / Bacteria: x      CAPILLARY BLOOD GLUCOSE      POCT Blood Glucose.: 121 mg/dL (21 Jun 2024 17:11)  POCT Blood Glucose.: 219 mg/dL (21 Jun 2024 12:05)  POCT Blood Glucose.: 149 mg/dL (21 Jun 2024 08:40)  POCT Blood Glucose.: 149 mg/dL (20 Jun 2024 21:13)      RADIOLOGY & ADDITIONAL TESTS:    Notes Reviewed:  [x ] YES  [ ] NO    Care Discussed with Consultants/Other Providers [x ] YES  [ ] NO

## 2024-06-21 NOTE — PROGRESS NOTE ADULT - ASSESSMENT
52 YO M with past medical history of AFib (on Eliquis), indwelling Aguilera, cerebral aneurysm, CAD (s/p stents), CVA, T2DM, HTN, OM (s/p debridement), PE, perforated gastric ulcer s/p ometnopexy 2019 with Dr. Mejia, transferred from [facility] for difficulty in breathing.      CAD, Afib, HTN  - s/p cardiac arrest x2 w/ AHRF admitted to the ICU, sedated int/ext, weaned off   levophed and epinephrine,  downgraded to F (6/18)  - No clear evidence of acute ischemia  - continue asa and statin     - arrest and johnny issues likely iatrogenic from double dosing of AVN  blockers  - Telemetry atrial fibrillation 80-90  - continue diltiazem 120mg Q8H and metoprolol   - continue Lovenox BID for AC    - TTE normal EF and mod-sev mr   - CXR on 6/14 with persistent mild left base infiltrate  - Repeat CXR unchanged   - no sign of volume overload on exam    - ATN with improving creat  - + MRSA, iso , rest management per primary    - Monitor and replete Lytes. Keep K > 4 and Mg > 2  - Will continue to follow. 52 YO M with past medical history of AFib (on Eliquis), indwelling Aguilera, cerebral aneurysm, CAD (s/p stents), CVA, T2DM, HTN, OM (s/p debridement), PE, perforated gastric ulcer s/p ometnopexy 2019 with Dr. Mejia, transferred from [facility] for difficulty in breathing.      CAD, Afib, HTN  - s/p cardiac arrest x2 w/ AHRF admitted to the ICU, sedated int/ext, weaned off   levophed and epinephrine,  downgraded to GMF (6/18)  - trops + but no sig trend. No clear evidence of acute ischemia  - continue asa and statin     - arrest and johnny issues. Per primary after review of records there was no double dosing of CCCB as pt was not given Cardizem at facility.   - johnny possibly reflexive from resp failure.   - Telemetry atrial fibrillation 80-90  - continue diltiazem 120mg Q8H and metoprolol   - continue Lovenox BID for AC    - TTE normal EF and mod-sev mr   - CXR on 6/14 with persistent mild left base infiltrate  - Repeat CXR unchanged   - no sign of volume overload on exam    - ATN with improving creat  - + MRSA, iso , rest management per primary    - Monitor and replete Lytes. Keep K > 4 and Mg > 2  - Will continue to follow. 54 YO M with past medical history of AFib (on Eliquis), indwelling Aguilera, cerebral aneurysm, CAD (s/p stents), CVA, T2DM, HTN, OM (s/p debridement), PE, perforated gastric ulcer s/p ometnopexy 2019 with Dr. Mejia, transferred from [facility] for difficulty in breathing.      CAD, Afib, HTN  - s/p cardiac arrest x2 w/ AHRF admitted to the ICU, sedated int/ext, weaned off   levophed and epinephrine,  downgraded to GMF (6/18)  - trops + but no sig trend. No clear evidence of acute ischemia  - continue asa and statin     - arrest and johnny issues. Per primary after review of records there was no double dosing of CCB as pt was not given Cardizem at facility.   - johnny possibly reflexive from resp failure.   - Telemetry atrial fibrillation 80-90  - continue diltiazem 120mg Q8H and metoprolol   - continue Lovenox BID for AC    - TTE normal EF and mod-sev mr   - CXR on 6/14 with persistent mild left base infiltrate  - Repeat CXR unchanged   - no sign of volume overload on exam    - ATN with improving creat  - + MRSA, iso , rest management per primary    - Monitor and replete Lytes. Keep K > 4 and Mg > 2  - Will continue to follow.

## 2024-06-21 NOTE — PROGRESS NOTE ADULT - ASSESSMENT
OBI: ATN  Hypokalemia  Metabolic acidosis: OBI, Lactic acidosis  Diabetes  Atrial fibrillation  s/p Cardiac arrest    Improved and stable renal indies. To continue current meds. D/c IVF. Monitor BP trend. Titrate BP meds as needed.    Monitor blood sugar levels. Insulin coverage as needed. Avoid nephrotoxic meds as possible. Will follow electrolytes and renal function trend.

## 2024-06-21 NOTE — OCCUPATIONAL THERAPY INITIAL EVALUATION ADULT - LEVEL OF INDEPENDENCE: BED TO CHAIR, REHAB EVAL
pt fatigued, in addition pt did not have surgical shoe for L LE. OT obtained for pt/unable to perform

## 2024-06-22 LAB
ALBUMIN SERPL ELPH-MCNC: 1.9 G/DL — LOW (ref 3.3–5)
ALP SERPL-CCNC: 90 U/L — SIGNIFICANT CHANGE UP (ref 40–120)
ALT FLD-CCNC: 51 U/L — SIGNIFICANT CHANGE UP (ref 12–78)
ANION GAP SERPL CALC-SCNC: 5 MMOL/L — SIGNIFICANT CHANGE UP (ref 5–17)
AST SERPL-CCNC: 18 U/L — SIGNIFICANT CHANGE UP (ref 15–37)
BILIRUB SERPL-MCNC: 0.4 MG/DL — SIGNIFICANT CHANGE UP (ref 0.2–1.2)
BUN SERPL-MCNC: 7 MG/DL — SIGNIFICANT CHANGE UP (ref 7–23)
CALCIUM SERPL-MCNC: 8.2 MG/DL — LOW (ref 8.5–10.1)
CHLORIDE SERPL-SCNC: 108 MMOL/L — SIGNIFICANT CHANGE UP (ref 96–108)
CO2 SERPL-SCNC: 23 MMOL/L — SIGNIFICANT CHANGE UP (ref 22–31)
CREAT SERPL-MCNC: 1.1 MG/DL — SIGNIFICANT CHANGE UP (ref 0.5–1.3)
EGFR: 79 ML/MIN/1.73M2 — SIGNIFICANT CHANGE UP
GLUCOSE BLDC GLUCOMTR-MCNC: 138 MG/DL — HIGH (ref 70–99)
GLUCOSE BLDC GLUCOMTR-MCNC: 170 MG/DL — HIGH (ref 70–99)
GLUCOSE BLDC GLUCOMTR-MCNC: 180 MG/DL — HIGH (ref 70–99)
GLUCOSE BLDC GLUCOMTR-MCNC: 201 MG/DL — HIGH (ref 70–99)
GLUCOSE SERPL-MCNC: 211 MG/DL — HIGH (ref 70–99)
HCT VFR BLD CALC: 32.2 % — LOW (ref 39–50)
HGB BLD-MCNC: 10.4 G/DL — LOW (ref 13–17)
MCHC RBC-ENTMCNC: 30.2 PG — SIGNIFICANT CHANGE UP (ref 27–34)
MCHC RBC-ENTMCNC: 32.3 GM/DL — SIGNIFICANT CHANGE UP (ref 32–36)
MCV RBC AUTO: 93.6 FL — SIGNIFICANT CHANGE UP (ref 80–100)
NRBC # BLD: 0 /100 WBCS — SIGNIFICANT CHANGE UP (ref 0–0)
PLATELET # BLD AUTO: 192 K/UL — SIGNIFICANT CHANGE UP (ref 150–400)
POTASSIUM SERPL-MCNC: 3.5 MMOL/L — SIGNIFICANT CHANGE UP (ref 3.5–5.3)
POTASSIUM SERPL-SCNC: 3.5 MMOL/L — SIGNIFICANT CHANGE UP (ref 3.5–5.3)
PROT SERPL-MCNC: 5.5 G/DL — LOW (ref 6–8.3)
RBC # BLD: 3.44 M/UL — LOW (ref 4.2–5.8)
RBC # FLD: 14.5 % — SIGNIFICANT CHANGE UP (ref 10.3–14.5)
SODIUM SERPL-SCNC: 136 MMOL/L — SIGNIFICANT CHANGE UP (ref 135–145)
VANCOMYCIN TROUGH SERPL-MCNC: 11.1 UG/ML — SIGNIFICANT CHANGE UP (ref 10–20)
WBC # BLD: 6.63 K/UL — SIGNIFICANT CHANGE UP (ref 3.8–10.5)
WBC # FLD AUTO: 6.63 K/UL — SIGNIFICANT CHANGE UP (ref 3.8–10.5)

## 2024-06-22 PROCEDURE — 99232 SBSQ HOSP IP/OBS MODERATE 35: CPT

## 2024-06-22 RX ORDER — HYDROXYZINE PAMOATE 50 MG/1
25 CAPSULE ORAL
Refills: 0 | Status: DISCONTINUED | OUTPATIENT
Start: 2024-06-22 | End: 2024-07-05

## 2024-06-22 RX ADMIN — Medication 50 MILLIGRAM(S): at 06:42

## 2024-06-22 RX ADMIN — OXYCODONE HYDROCHLORIDE 10 MILLIGRAM(S): 100 SOLUTION ORAL at 06:41

## 2024-06-22 RX ADMIN — Medication 25 MILLIGRAM(S): at 18:34

## 2024-06-22 RX ADMIN — ATORVASTATIN CALCIUM 40 MILLIGRAM(S): 20 TABLET, FILM COATED ORAL at 22:32

## 2024-06-22 RX ADMIN — Medication 20 MILLIGRAM(S): at 18:34

## 2024-06-22 RX ADMIN — Medication 50 MILLIGRAM(S): at 22:31

## 2024-06-22 RX ADMIN — TIOTROPIUM BROMIDE AND OLODATEROL 2 PUFF(S): 3.124; 2.736 SPRAY, METERED RESPIRATORY (INHALATION) at 06:42

## 2024-06-22 RX ADMIN — ENOXAPARIN SODIUM 100 MILLIGRAM(S): 100 INJECTION SUBCUTANEOUS at 18:34

## 2024-06-22 RX ADMIN — OXYCODONE HYDROCHLORIDE 10 MILLIGRAM(S): 100 SOLUTION ORAL at 16:54

## 2024-06-22 RX ADMIN — Medication 100 MILLIGRAM(S): at 06:42

## 2024-06-22 RX ADMIN — ENOXAPARIN SODIUM 100 MILLIGRAM(S): 100 INJECTION SUBCUTANEOUS at 06:41

## 2024-06-22 RX ADMIN — DILTIAZEM HYDROCHLORIDE 120 MILLIGRAM(S): 240 CAPSULE, EXTENDED RELEASE ORAL at 15:54

## 2024-06-22 RX ADMIN — OXYCODONE HYDROCHLORIDE 10 MILLIGRAM(S): 100 SOLUTION ORAL at 23:31

## 2024-06-22 RX ADMIN — OXYCODONE HYDROCHLORIDE 10 MILLIGRAM(S): 100 SOLUTION ORAL at 22:31

## 2024-06-22 RX ADMIN — DILTIAZEM HYDROCHLORIDE 120 MILLIGRAM(S): 240 CAPSULE, EXTENDED RELEASE ORAL at 06:41

## 2024-06-22 RX ADMIN — ASPIRIN 81 MILLIGRAM(S): 325 TABLET, FILM COATED ORAL at 12:25

## 2024-06-22 RX ADMIN — INSULIN LISPRO 2: 100 INJECTION, SOLUTION SUBCUTANEOUS at 17:59

## 2024-06-22 RX ADMIN — Medication 20 MILLIGRAM(S): at 06:42

## 2024-06-22 RX ADMIN — Medication 100 MILLIGRAM(S): at 18:34

## 2024-06-22 RX ADMIN — VANCOMYCIN HYDROCHLORIDE 250 MILLIGRAM(S): KIT at 12:25

## 2024-06-22 RX ADMIN — Medication 1 APPLICATION(S): at 12:26

## 2024-06-22 RX ADMIN — DILTIAZEM HYDROCHLORIDE 120 MILLIGRAM(S): 240 CAPSULE, EXTENDED RELEASE ORAL at 22:31

## 2024-06-22 RX ADMIN — VANCOMYCIN HYDROCHLORIDE 250 MILLIGRAM(S): KIT at 20:22

## 2024-06-22 RX ADMIN — Medication 1 APPLICATION(S): at 18:34

## 2024-06-22 RX ADMIN — Medication 50 MILLIGRAM(S): at 18:34

## 2024-06-22 RX ADMIN — INSULIN LISPRO 4: 100 INJECTION, SOLUTION SUBCUTANEOUS at 12:26

## 2024-06-22 RX ADMIN — OXYCODONE HYDROCHLORIDE 10 MILLIGRAM(S): 100 SOLUTION ORAL at 07:41

## 2024-06-22 RX ADMIN — HYDROXYZINE PAMOATE 25 MILLIGRAM(S): 50 CAPSULE ORAL at 22:31

## 2024-06-22 RX ADMIN — CLOBETASOL PROPIONATE 1 APPLICATION(S): 0.5 CREAM TOPICAL at 18:42

## 2024-06-22 RX ADMIN — OXYCODONE HYDROCHLORIDE 10 MILLIGRAM(S): 100 SOLUTION ORAL at 15:54

## 2024-06-22 RX ADMIN — Medication 1 APPLICATION(S): at 06:43

## 2024-06-22 RX ADMIN — Medication 25 MILLIGRAM(S): at 06:41

## 2024-06-22 RX ADMIN — CLOBETASOL PROPIONATE 1 APPLICATION(S): 0.5 CREAM TOPICAL at 06:35

## 2024-06-22 NOTE — PHARMACOTHERAPY INTERVENTION NOTE - COMMENTS
Vancomycin Dosing Per Pharmacy:  Patient is a 52 yo male being treated for R heel wound/osteomyelitis, currently ordered for IV vancomycin. Renal function improving, patient to receive 1000 mg Q12H. Trough level 6/21 (9:30) falsely elevated at 37.5 as it was taken after dose was running. PM dose was held 06/21. Continue with current treatment. Trough resulted on 6/22 at 11.1. Nurse aware to give dose.     1. Indication for the vancomycin: R heel wound/osteomyelitis  2. Requesting Provider: Dr. Whitten  3. Current plan and dosing regimen: 1000 mg Q12H  4. Day of therapy: 19  5. Cultures: Tissue Cx 5/26/24: E faecalis + MRSA. Tissue Cx 6/4/24: ESBL E coli + E faecalis. R heel surgical swab 6/14/24: MRSA + citrobacter freundii + pseudomonas stutzeri.  6. Target trough or AUC/BAYLEE: 400-600  7. Day and time level is due: 6/23 at 08:00  8. Previous levels: 6/4 (19:31): 22, 6/5 (05:47): 14, 6/6 (20:20): 14, 6/9 (04:58): 14.4, 6/11 (04:30): 15.7, 6/13 (05:54): 17.3, 6/14 (01:25): 15.2, 6/14 (04:30): 16.1, 6/15 (5:40): 17.5, 6/16 (5:38): 15.3, 6/17 (05:52): 13.2, 6/18 (05:50): 11.8, 6/19 (06:15): 21.3, 6/20 (08:52): 21.6, 6/21 (09:30): 37.5, 06/22 (0945): 11.1   9. Expected 24-hour AUC: 435

## 2024-06-22 NOTE — PROGRESS NOTE ADULT - SUBJECTIVE AND OBJECTIVE BOX
[facility] -- Bolivar Carbajal,  Brittany, Reynaldo Sweeney Savella, Goodger, Cohen  Office # [phone]    Follow Up:  Acute respiratory failure with hypoxia    Subjective/Observations: Patient seen and examined. Patient alert awake, Denies chest pain palpitation dizziness, denies difficulty breathing , no orthopnea or PND noted. Tolerating room air  Tele Afib  bpm.     REVIEW OF SYSTEMS: All other review of systems is negative unless indicated above  PAST MEDICAL & SURGICAL HISTORY:  Diabetes      Diabetes mellitus with no complication      Afib      Hypertension      BPH (benign prostatic hyperplasia)      Perforated gastric ulcer  s/p emergent ex-lap omentopexy and plication 6/2019      Pulmonary embolism      History of non-ST elevation myocardial infarction (NSTEMI)      Osteomyelitis  s/p debridement      CAD S/P percutaneous coronary angioplasty      Cerebrovascular accident      H/O abdominal surgery      Perforated gastric ulcer      Traumatic amputation of left foot, initial encounter        MEDICATIONS  (STANDING):  aspirin enteric coated 81 milliGRAM(s) Oral daily  atorvastatin 40 milliGRAM(s) Oral at bedtime  benzocaine 20% Spray 1 Spray(s) Topical every 6 hours  calamine/zinc oxide Lotion 1 Application(s) Topical two times a day  chlorhexidine 2% Cloths 1 Application(s) Topical daily  clobetasol 0.05% Cream 1 Application(s) Topical two times a day  dextrose 10% Bolus 125 milliLiter(s) IV Bolus once  dextrose 5%. 1000 milliLiter(s) (50 mL/Hr) IV Continuous <Continuous>  dextrose 5%. 1000 milliLiter(s) (100 mL/Hr) IV Continuous <Continuous>  dextrose 50% Injectable 25 Gram(s) IV Push once  dextrose 50% Injectable 12.5 Gram(s) IV Push once  diltiazem    Tablet 120 milliGRAM(s) Oral every 8 hours  enoxaparin Injectable 100 milliGRAM(s) SubCutaneous every 12 hours  famotidine    Tablet 20 milliGRAM(s) Oral two times a day  gabapentin 100 milliGRAM(s) Oral every 12 hours  glucagon  Injectable 1 milliGRAM(s) IntraMuscular once  hydrOXYzine hydrochloride 25 milliGRAM(s) Oral daily  insulin lispro (ADMELOG) corrective regimen sliding scale   SubCutaneous three times a day before meals  metoprolol tartrate 50 milliGRAM(s) Oral every 6 hours  tiotropium 2.5 MICROgram(s)/olodaterol 2.5 MICROgram(s) (STIOLTO) Inhaler 2 Puff(s) Inhalation daily  vancomycin  IVPB 1000 milliGRAM(s) IV Intermittent every 12 hours    MEDICATIONS  (PRN):  acetaminophen     Tablet .. 650 milliGRAM(s) Oral every 6 hours PRN Mild Pain (1 - 3)  diphenhydrAMINE 25 milliGRAM(s) Oral every 4 hours PRN Rash and/or Itching  metoprolol tartrate Injectable 5 milliGRAM(s) IV Push every 6 hours PRN HR >140  oxyCODONE    IR 10 milliGRAM(s) Oral every 6 hours PRN Severe Pain (7 - 10)  oxyCODONE    IR 5 milliGRAM(s) Oral every 6 hours PRN Moderate Pain (4 - 6)  sodium chloride 0.9% lock flush 10 milliLiter(s) IV Push every 1 hour PRN Pre/post blood products, medications, blood draw, and to maintain line patency    Allergies    ertapenem (Blisters; Rash)  fish (Hives)    Intolerances      Vital Signs Last 24 Hrs  T(C): 36.8 (22 Jun 2024 04:45), Max: 36.8 (22 Jun 2024 04:45)  T(F): 98.2 (22 Jun 2024 04:45), Max: 98.2 (22 Jun 2024 04:45)  HR: 88 (22 Jun 2024 04:45) (77 - 108)  BP: 118/74 (22 Jun 2024 04:45) (105/69 - 118/74)  BP(mean): --  RR: 18 (22 Jun 2024 04:45) (18 - 18)  SpO2: 94% (22 Jun 2024 04:45) (91% - 95%)    Parameters below as of 21 Jun 2024 21:13  Patient On (Oxygen Delivery Method): room air      I&O's Summary    21 Jun 2024 07:01  -  22 Jun 2024 07:00  --------------------------------------------------------  IN: 0 mL / OUT: 2900 mL / NET: -2900 mL        TELE: atrial fibrillation 70- 140, 4 beats of nsvt   PHYSICAL EXAM:  Constitutional: NAD, awake and alert,   HEENT: Moist Mucous Membranes, Anicteric  Pulmonary: Non-labored, breath sounds are clear bilaterally, No wheezing, rales or rhonchi  Cardiovascular: IRRegular, S1 and S2, No murmurs, No rubs, gallops or clicks  Gastrointestinal:  soft, nontender, nondistended   Lymph: + peripheral edema.  R leg dsg intact  Skin: + rashes   Psych:  Mood & affect appropriate    LABS: All Labs Reviewed:                        10.3   7.83  )-----------( 73       ( 20 Jun 2024 12:20 )             32.7     20 Jun 2024 12:20    140    |  110    |  10     ----------------------------<  172    3.4     |  24     |  1.10     Ca    8.1        20 Jun 2024 12:20  Phos  2.5       20 Jun 2024 12:20  Mg     1.7       20 Jun 2024 12:20    TPro  5.3    /  Alb  2.0    /  TBili  0.4    /  DBili  x      /  AST  28     /  ALT  92     /  AlkPhos  111    20 Jun 2024 12:20          12 Lead ECG:   Ventricular Rate 126 BPM    QRS Duration 76 ms    Q-T Interval 302 ms    QTC Calculation(Bazett) 437 ms    R Axis -12 degrees    T Axis 103 degrees    Diagnosis Line Atrial fibrillation with rapid ventricular response  Low voltage QRS  Nonspecific ST and T wave abnormality  Abnormal ECG  When compared with ECG of 14-JUN-2024 11:03,  Questionable change in QRS axis  Nonspecific T wave abnormality now evident in Inferior leads  Nonspecific T wave abnormality, worse in Anterolateral leads  Confirmed by Juventino Soto MD ([id number]) on 6/20/2024 12:52:09 PM (06-20-24 @ 11:37)       EXAM:  ECHO TTE WO CON COMP W DOPPLR         PROCEDURE DATE:  12/24/2019        INTERPRETATION:  INDICATION: Coronary artery disease    Blood Pressure 117/71    Height 187     Weight 93       BSA 2.2    Dimensions:    LA 4.2       Normal Values: 2.0 - 4.0 cm    Ao 4.0        Normal Values: 2.0 - 3.8 cm  SEPTUM 1.6       Normal Values: 0.6 - 1.2 cm  PWT 1.6       Normal Values: 0.6 - 1.1 cm  LVIDd 5.8         Normal Values: 3.0 - 5.6 cm  LVIDs 3.2         Normal Values: 1.8 - 4.0 cm    Derived Variables:  LVMI g/m2  RWT  Fractional Short  Ejection Fraction    Doppler Peak v. AoV= (m/sec)    OBSERVATIONS:    Mitral Valve: normal, mild MR.  Aortic Valve/Aorta: normal trileaflet aortic valve.  Tricuspid Valve: normal with trace TR.  Pulmonic Valve: normal  Left Atrium: Enlarged  Right Atrium: normal  Left Ventricle: Severe concentric LVH with normal systolic function, estimated LVEF of 65%.  Right Ventricle: normal size and systolic function.  Pericardium/Pleura: no significant pleural effusion, no significant pericardial effusion.  Pulmonary/RV Pressure: Inadequate TR jet to estimate PA systolic pressure  LV Diastolic Function: Grade 2diastolic dysfunction.    Severe concentric LVH, and mild LV enlargement, with normal systolic function, estimated LVEF of 65%. Normal right ventricular size and systolic function. Grade 2diastolic dysfunction. Left atrial enlargement The aortic root is normal in size. The mitral valve is structurally normal, mild MR. The aortic valve is trileaflet without stenosis or insufficiency. Trace physiologic TR. Inadequate TR jet to estimate PA systolic pressure No significant pericardial effusion.                  JUVENTINO SOTO M.D., ATTENDING CARDIOLOGIST  This document has been electronically signed. Dec 25 2019 11:45AM

## 2024-06-22 NOTE — PROGRESS NOTE ADULT - PROBLEM SELECTOR PLAN 1
S/P Intubated and admitted to ICU  etiology unclear  S/P vent support per intensivist  now extubated  transfer to floor care

## 2024-06-22 NOTE — PROGRESS NOTE ADULT - SUBJECTIVE AND OBJECTIVE BOX
Date of Service 06-22-24 @ 18:14    Patient is a 53y old  Male who presents with a chief complaint of Acute respiratory failure with hypoxia (22 Jun 2024 11:38)      INTERVAL /OVERNIGHT EVENTS: c/o rash    MEDICATIONS  (STANDING):  aspirin enteric coated 81 milliGRAM(s) Oral daily  atorvastatin 40 milliGRAM(s) Oral at bedtime  benzocaine 20% Spray 1 Spray(s) Topical every 6 hours  calamine/zinc oxide Lotion 1 Application(s) Topical two times a day  chlorhexidine 2% Cloths 1 Application(s) Topical daily  clobetasol 0.05% Cream 1 Application(s) Topical two times a day  dextrose 10% Bolus 125 milliLiter(s) IV Bolus once  dextrose 5%. 1000 milliLiter(s) (50 mL/Hr) IV Continuous <Continuous>  dextrose 5%. 1000 milliLiter(s) (100 mL/Hr) IV Continuous <Continuous>  dextrose 50% Injectable 12.5 Gram(s) IV Push once  dextrose 50% Injectable 25 Gram(s) IV Push once  diltiazem    Tablet 120 milliGRAM(s) Oral every 8 hours  enoxaparin Injectable 100 milliGRAM(s) SubCutaneous every 12 hours  famotidine    Tablet 20 milliGRAM(s) Oral two times a day  gabapentin 100 milliGRAM(s) Oral every 12 hours  glucagon  Injectable 1 milliGRAM(s) IntraMuscular once  hydrOXYzine hydrochloride 25 milliGRAM(s) Oral two times a day  insulin lispro (ADMELOG) corrective regimen sliding scale   SubCutaneous three times a day before meals  metoprolol tartrate 50 milliGRAM(s) Oral every 6 hours  tiotropium 2.5 MICROgram(s)/olodaterol 2.5 MICROgram(s) (STIOLTO) Inhaler 2 Puff(s) Inhalation daily  vancomycin  IVPB 1000 milliGRAM(s) IV Intermittent every 12 hours    MEDICATIONS  (PRN):  acetaminophen     Tablet .. 650 milliGRAM(s) Oral every 6 hours PRN Mild Pain (1 - 3)  diphenhydrAMINE 25 milliGRAM(s) Oral every 4 hours PRN Rash and/or Itching  metoprolol tartrate Injectable 5 milliGRAM(s) IV Push every 6 hours PRN HR >140  oxyCODONE    IR 5 milliGRAM(s) Oral every 6 hours PRN Moderate Pain (4 - 6)  oxyCODONE    IR 10 milliGRAM(s) Oral every 6 hours PRN Severe Pain (7 - 10)  sodium chloride 0.9% lock flush 10 milliLiter(s) IV Push every 1 hour PRN Pre/post blood products, medications, blood draw, and to maintain line patency      Allergies    ertapenem (Blisters; Rash)  fish (Hives)    Intolerances        REVIEW OF SYSTEMS:  CONSTITUTIONAL: No fever, weight loss, or fatigue  EYES: No eye pain, visual disturbances, or discharge  ENMT:  No difficulty hearing, tinnitus, vertigo; No sinus or throat pain  NECK: No pain or stiffness  RESPIRATORY: No cough, wheezing, chills or hemoptysis; No shortness of breath  CARDIOVASCULAR: No chest pain, palpitations, dizziness, or leg swelling  GASTROINTESTINAL: No abdominal or epigastric pain. No nausea, vomiting, or hematemesis; No diarrhea or constipation. No melena or hematochezia.  GENITOURINARY: No dysuria, frequency, hematuria, or incontinence  NEUROLOGICAL: No headaches, memory loss, loss of strength, numbness, or tremors  SKIN: +, rashes  LYMPH NODES: No enlarged glands  ENDOCRINE: No heat or cold intolerance; No hair loss; No polydipsia or polyuria  MUSCULOSKELETAL: No joint pain or swelling; No muscle, back, or extremity pain  PSYCHIATRIC: No depression, anxiety, mood swings, or difficulty sleeping  HEME/LYMPH: No easy bruising, or bleeding gums  ALLERGY AND IMMUNOLOGIC: No hives or eczema    Vital Signs Last 24 Hrs  T(C): 36.8 (22 Jun 2024 12:24), Max: 36.8 (22 Jun 2024 04:45)  T(F): 98.3 (22 Jun 2024 12:24), Max: 98.3 (22 Jun 2024 12:24)  HR: 90 (22 Jun 2024 12:24) (88 - 94)  BP: 98/63 (22 Jun 2024 12:24) (98/63 - 118/74)  BP(mean): --  RR: 18 (22 Jun 2024 12:24) (18 - 18)  SpO2: 93% (22 Jun 2024 12:24) (93% - 95%)    Parameters below as of 22 Jun 2024 12:24  Patient On (Oxygen Delivery Method): room air        PHYSICAL EXAM:  GENERAL: NAD, well-groomed, well-developed  HEAD:  Atraumatic, Normocephalic  EYES: EOMI, PERRLA, conjunctiva and sclera clear  ENMT: No tonsillar erythema, exudates, or enlargement; Moist mucous membranes, Good dentition, No lesions  NECK: Supple, No JVD, Normal thyroid  NERVOUS SYSTEM:  Alert & Oriented X3, Good concentration; Motor Strength 5/5 B/L upper and lower extremities; DTRs 2+ intact and symmetric  CHEST/LUNG: Clear to auscultation bilaterally; No rales, rhonchi, wheezing, or rubs  HEART: Regular rate and rhythm; No murmurs, rubs, or gallops  ABDOMEN: Soft, Nontender, Nondistended; Bowel sounds present  EXTREMITIES:  2+ Peripheral Pulses, No clubbing, cyanosis, or edema  LYMPH: No lymphadenopathy noted  SKIN: + rashes     LABS:                        10.4   6.63  )-----------( 192      ( 22 Jun 2024 14:08 )             32.2     22 Jun 2024 14:08    136    |  108    |  7      ----------------------------<  211    3.5     |  23     |  1.10     Ca    8.2        22 Jun 2024 14:08    TPro  5.5    /  Alb  1.9    /  TBili  0.4    /  DBili  x      /  AST  18     /  ALT  51     /  AlkPhos  90     22 Jun 2024 14:08      Urinalysis Basic - ( 22 Jun 2024 14:08 )    Color: x / Appearance: x / SG: x / pH: x  Gluc: 211 mg/dL / Ketone: x  / Bili: x / Urobili: x   Blood: x / Protein: x / Nitrite: x   Leuk Esterase: x / RBC: x / WBC x   Sq Epi: x / Non Sq Epi: x / Bacteria: x      CAPILLARY BLOOD GLUCOSE      POCT Blood Glucose.: 170 mg/dL (22 Jun 2024 17:06)  POCT Blood Glucose.: 201 mg/dL (22 Jun 2024 12:05)  POCT Blood Glucose.: 138 mg/dL (22 Jun 2024 08:09)  POCT Blood Glucose.: 127 mg/dL (21 Jun 2024 21:26)      RADIOLOGY & ADDITIONAL TESTS:    Notes Reviewed:  [x ] YES  [ ] NO    Care Discussed with Consultants/Other Providers [x ] YES  [ ] NO

## 2024-06-22 NOTE — PROGRESS NOTE ADULT - ASSESSMENT
52 YO M with past medical history of AFib (on Eliquis), indwelling Aguilera, cerebral aneurysm, CAD (s/p stents), CVA, T2DM, HTN, OM (s/p debridement), PE, perforated gastric ulcer s/p ometnopexy 2019 with Dr. Mejia, transferred from [facility] for difficulty in breathing.      CAD, Afib, HTN  - s/p cardiac arrest x2 w/ AHRF admitted to the ICU, sedated int/ext, weaned off   levophed and epinephrine,  downgraded to GMF (6/18)  - trops + but no sig trend. No clear evidence of acute ischemia  - continue asa and statin     - arrest and johnny issues. Per primary after review of records there was no double dosing of CCB as pt was not given Cardizem at facility.   - johnny possibly reflexive from resp failure.   - Telemetry atrial fibrillation 70-140s possibly in the setting of bb held 6/21 6pm   - continue diltiazem 120mg Q8H and metoprolol 50mg q6hrs.  - prn metoprolol ivp for hr > 140s   - continue Lovenox BID for AC    - TTE normal EF and mod-sev mr   - CXR on 6/14 with persistent mild left base infiltrate  - Repeat CXR unchanged   - no sign of volume overload on exam    - ATN with improving creat  - + MRSA, iso , rest management per primary    - Monitor and replete lytes, keep K>4, Mg>2.  - Will continue to follow.    Nery Alex NP  Nurse Practitioner- Cardiology   Call TEAMS

## 2024-06-23 LAB
GLUCOSE BLDC GLUCOMTR-MCNC: 157 MG/DL — HIGH (ref 70–99)
GLUCOSE BLDC GLUCOMTR-MCNC: 192 MG/DL — HIGH (ref 70–99)
GLUCOSE BLDC GLUCOMTR-MCNC: 213 MG/DL — HIGH (ref 70–99)
VANCOMYCIN TROUGH SERPL-MCNC: 15.9 UG/ML — SIGNIFICANT CHANGE UP (ref 10–20)

## 2024-06-23 RX ORDER — METOPROLOL TARTRATE 50 MG
50 TABLET ORAL EVERY 6 HOURS
Refills: 0 | Status: DISCONTINUED | OUTPATIENT
Start: 2024-06-23 | End: 2024-06-27

## 2024-06-23 RX ORDER — DILTIAZEM HYDROCHLORIDE 240 MG/1
120 CAPSULE, EXTENDED RELEASE ORAL EVERY 8 HOURS
Refills: 0 | Status: DISCONTINUED | OUTPATIENT
Start: 2024-06-23 | End: 2024-06-27

## 2024-06-23 RX ADMIN — VANCOMYCIN HYDROCHLORIDE 250 MILLIGRAM(S): KIT at 12:50

## 2024-06-23 RX ADMIN — Medication 25 MILLIGRAM(S): at 12:51

## 2024-06-23 RX ADMIN — Medication 20 MILLIGRAM(S): at 18:06

## 2024-06-23 RX ADMIN — INSULIN LISPRO 4: 100 INJECTION, SOLUTION SUBCUTANEOUS at 18:06

## 2024-06-23 RX ADMIN — ENOXAPARIN SODIUM 100 MILLIGRAM(S): 100 INJECTION SUBCUTANEOUS at 18:06

## 2024-06-23 RX ADMIN — HYDROXYZINE PAMOATE 25 MILLIGRAM(S): 50 CAPSULE ORAL at 21:32

## 2024-06-23 RX ADMIN — ENOXAPARIN SODIUM 100 MILLIGRAM(S): 100 INJECTION SUBCUTANEOUS at 06:24

## 2024-06-23 RX ADMIN — OXYCODONE HYDROCHLORIDE 10 MILLIGRAM(S): 100 SOLUTION ORAL at 22:33

## 2024-06-23 RX ADMIN — Medication 25 MILLIGRAM(S): at 18:06

## 2024-06-23 RX ADMIN — Medication 1 APPLICATION(S): at 12:51

## 2024-06-23 RX ADMIN — TIOTROPIUM BROMIDE AND OLODATEROL 2 PUFF(S): 3.124; 2.736 SPRAY, METERED RESPIRATORY (INHALATION) at 06:23

## 2024-06-23 RX ADMIN — ASPIRIN 81 MILLIGRAM(S): 325 TABLET, FILM COATED ORAL at 12:51

## 2024-06-23 RX ADMIN — DILTIAZEM HYDROCHLORIDE 120 MILLIGRAM(S): 240 CAPSULE, EXTENDED RELEASE ORAL at 21:31

## 2024-06-23 RX ADMIN — Medication 100 MILLIGRAM(S): at 06:23

## 2024-06-23 RX ADMIN — Medication 1 APPLICATION(S): at 18:07

## 2024-06-23 RX ADMIN — INSULIN LISPRO 2: 100 INJECTION, SOLUTION SUBCUTANEOUS at 08:34

## 2024-06-23 RX ADMIN — CLOBETASOL PROPIONATE 1 APPLICATION(S): 0.5 CREAM TOPICAL at 06:27

## 2024-06-23 RX ADMIN — OXYCODONE HYDROCHLORIDE 10 MILLIGRAM(S): 100 SOLUTION ORAL at 13:51

## 2024-06-23 RX ADMIN — Medication 50 MILLIGRAM(S): at 12:51

## 2024-06-23 RX ADMIN — Medication 650 MILLIGRAM(S): at 15:19

## 2024-06-23 RX ADMIN — OXYCODONE HYDROCHLORIDE 10 MILLIGRAM(S): 100 SOLUTION ORAL at 21:33

## 2024-06-23 RX ADMIN — DILTIAZEM HYDROCHLORIDE 120 MILLIGRAM(S): 240 CAPSULE, EXTENDED RELEASE ORAL at 13:19

## 2024-06-23 RX ADMIN — VANCOMYCIN HYDROCHLORIDE 250 MILLIGRAM(S): KIT at 21:30

## 2024-06-23 RX ADMIN — Medication 100 MILLIGRAM(S): at 18:06

## 2024-06-23 RX ADMIN — OXYCODONE HYDROCHLORIDE 10 MILLIGRAM(S): 100 SOLUTION ORAL at 12:51

## 2024-06-23 RX ADMIN — CLOBETASOL PROPIONATE 1 APPLICATION(S): 0.5 CREAM TOPICAL at 18:24

## 2024-06-23 RX ADMIN — HYDROXYZINE PAMOATE 25 MILLIGRAM(S): 50 CAPSULE ORAL at 06:23

## 2024-06-23 RX ADMIN — Medication 20 MILLIGRAM(S): at 06:23

## 2024-06-23 RX ADMIN — Medication 1 APPLICATION(S): at 06:23

## 2024-06-23 RX ADMIN — ATORVASTATIN CALCIUM 40 MILLIGRAM(S): 20 TABLET, FILM COATED ORAL at 21:32

## 2024-06-23 RX ADMIN — INSULIN LISPRO 2: 100 INJECTION, SOLUTION SUBCUTANEOUS at 12:50

## 2024-06-23 RX ADMIN — Medication 650 MILLIGRAM(S): at 14:19

## 2024-06-23 RX ADMIN — Medication 25 MILLIGRAM(S): at 06:22

## 2024-06-23 NOTE — PHARMACOTHERAPY INTERVENTION NOTE - COMMENTS
Vancomycin Dosing Per Pharmacy:  Patient is a 54 yo male being treated for R heel wound/osteomyelitis, currently ordered for IV vancomycin. Renal function improving, patient to receive 1000 mg Q12H. Trough level 6/21 (9:30) falsely elevated at 37.5 as it was taken while dose was being given. 2 subsequent doses were held. Continue with current treatment.     1. Indication for the vancomycin: R heel wound/osteomyelitis  2. Requesting Provider: Dr. Whitten  3. Current plan and dosing regimen: 1000 mg Q12H  4. Day of therapy: 20  5. Cultures: Tissue Cx 5/26/24: E faecalis + MRSA. Tissue Cx 6/4/24: ESBL E coli + E faecalis. R heel surgical swab 6/14/24: MRSA + citrobacter freundii + pseudomonas stutzeri.  6. Target trough or AUC/BAYLEE: 400-600  7. Day and time level is due: 6/24 08:00  8. Previous levels: 6/4 (19:31): 22, 6/5 (05:47): 14, 6/6 (20:20): 14, 6/9 (04:58): 14.4, 6/11 (04:30): 15.7, 6/13 (05:54): 17.3, 6/14 (01:25): 15.2, 6/14 (04:30): 16.1, 6/15 (5:40): 17.5, 6/16 (5:38): 15.3, 6/17 (05:52): 13.2, 6/18 (05:50): 11.8, 6/19 (06:15): 21.3, 6/20 (08:52): 21.6, 6/21 (09:30): 37.5, 06/22 (0945): 11.1, 06/23 (09:30): 15.9  9. Expected 24-hour AUC: 441

## 2024-06-23 NOTE — PROGRESS NOTE ADULT - SUBJECTIVE AND OBJECTIVE BOX
[facility] -- Bolivar Carbajal,  Brittany, Reynaldo Sweeney Savella, Goodger, Cohen  Office # [phone]    Follow Up:   Acute respiratory failure with hypoxia    Subjective/Observations: Patient seen and examined. Patient alert awake, Denies chest pain palpitation dizziness, denies difficulty breathing , no orthopnea or PND noted. Tolerating room air  Tele Afib  bpm.     REVIEW OF SYSTEMS: All other review of systems is negative unless indicated above  PAST MEDICAL & SURGICAL HISTORY:  Diabetes      Diabetes mellitus with no complication      Afib      Hypertension      BPH (benign prostatic hyperplasia)      Perforated gastric ulcer  s/p emergent ex-lap omentopexy and plication 6/2019      Pulmonary embolism      History of non-ST elevation myocardial infarction (NSTEMI)      Osteomyelitis  s/p debridement      CAD S/P percutaneous coronary angioplasty      Cerebrovascular accident      H/O abdominal surgery      Perforated gastric ulcer      Traumatic amputation of left foot, initial encounter        MEDICATIONS  (STANDING):  aspirin enteric coated 81 milliGRAM(s) Oral daily  atorvastatin 40 milliGRAM(s) Oral at bedtime  benzocaine 20% Spray 1 Spray(s) Topical every 6 hours  calamine/zinc oxide Lotion 1 Application(s) Topical two times a day  chlorhexidine 2% Cloths 1 Application(s) Topical daily  clobetasol 0.05% Cream 1 Application(s) Topical two times a day  dextrose 10% Bolus 125 milliLiter(s) IV Bolus once  dextrose 5%. 1000 milliLiter(s) (50 mL/Hr) IV Continuous <Continuous>  dextrose 5%. 1000 milliLiter(s) (100 mL/Hr) IV Continuous <Continuous>  dextrose 50% Injectable 25 Gram(s) IV Push once  dextrose 50% Injectable 12.5 Gram(s) IV Push once  diltiazem    Tablet 120 milliGRAM(s) Oral every 8 hours  enoxaparin Injectable 100 milliGRAM(s) SubCutaneous every 12 hours  famotidine    Tablet 20 milliGRAM(s) Oral two times a day  gabapentin 100 milliGRAM(s) Oral every 12 hours  glucagon  Injectable 1 milliGRAM(s) IntraMuscular once  hydrOXYzine hydrochloride 25 milliGRAM(s) Oral two times a day  insulin lispro (ADMELOG) corrective regimen sliding scale   SubCutaneous three times a day before meals  metoprolol tartrate 50 milliGRAM(s) Oral every 6 hours  tiotropium 2.5 MICROgram(s)/olodaterol 2.5 MICROgram(s) (STIOLTO) Inhaler 2 Puff(s) Inhalation daily  vancomycin  IVPB 1000 milliGRAM(s) IV Intermittent every 12 hours    MEDICATIONS  (PRN):  acetaminophen     Tablet .. 650 milliGRAM(s) Oral every 6 hours PRN Mild Pain (1 - 3)  diphenhydrAMINE 25 milliGRAM(s) Oral every 4 hours PRN Rash and/or Itching  metoprolol tartrate Injectable 5 milliGRAM(s) IV Push every 6 hours PRN HR >140  oxyCODONE    IR 10 milliGRAM(s) Oral every 6 hours PRN Severe Pain (7 - 10)  oxyCODONE    IR 5 milliGRAM(s) Oral every 6 hours PRN Moderate Pain (4 - 6)  sodium chloride 0.9% lock flush 10 milliLiter(s) IV Push every 1 hour PRN Pre/post blood products, medications, blood draw, and to maintain line patency    Allergies    ertapenem (Blisters; Rash)  fish (Hives)    Intolerances      Vital Signs Last 24 Hrs  T(C): 37.3 (23 Jun 2024 05:00), Max: 37.3 (23 Jun 2024 05:00)  T(F): 99.2 (23 Jun 2024 05:00), Max: 99.2 (23 Jun 2024 05:00)  HR: 74 (23 Jun 2024 05:00) (74 - 90)  BP: 92/56 (23 Jun 2024 05:00) (92/56 - 113/74)  BP(mean): --  RR: 18 (23 Jun 2024 05:00) (17 - 18)  SpO2: 94% (23 Jun 2024 05:00) (93% - 95%)    Parameters below as of 23 Jun 2024 05:00  Patient On (Oxygen Delivery Method): room air      I&O's Summary    22 Jun 2024 07:01  -  23 Jun 2024 07:00  --------------------------------------------------------  IN: 250 mL / OUT: 2500 mL / NET: -2250 mL        TELE: atrial fibrillation 70- 130, 7 beats of nsvt   PHYSICAL EXAM:  Constitutional: NAD, awake and alert,   HEENT: Moist Mucous Membranes, Anicteric  Pulmonary: Non-labored, breath sounds are clear bilaterally, No wheezing, rales or rhonchi  Cardiovascular: IRRegular, S1 and S2, No murmurs, No rubs, gallops or clicks  Gastrointestinal:  soft, nontender, nondistended   Lymph: + peripheral edema.  R leg dsg intact  Skin: + rashes   Psych:  Mood & affect appropriate    LABS: All Labs Reviewed:                        10.4   6.63  )-----------( 192      ( 22 Jun 2024 14:08 )             32.2                         10.3   7.83  )-----------( 73       ( 20 Jun 2024 12:20 )             32.7     22 Jun 2024 14:08    136    |  108    |  7      ----------------------------<  211    3.5     |  23     |  1.10   20 Jun 2024 12:20    140    |  110    |  10     ----------------------------<  172    3.4     |  24     |  1.10     Ca    8.2        22 Jun 2024 14:08  Ca    8.1        20 Jun 2024 12:20  Phos  2.5       20 Jun 2024 12:20  Mg     1.7       20 Jun 2024 12:20    TPro  5.5    /  Alb  1.9    /  TBili  0.4    /  DBili  x      /  AST  18     /  ALT  51     /  AlkPhos  90     22 Jun 2024 14:08  TPro  5.3    /  Alb  2.0    /  TBili  0.4    /  DBili  x      /  AST  28     /  ALT  92     /  AlkPhos  111    20 Jun 2024 12:20          12 Lead ECG:   Ventricular Rate 126 BPM    QRS Duration 76 ms    Q-T Interval 302 ms    QTC Calculation(Bazett) 437 ms    R Axis -12 degrees    T Axis 103 degrees    Diagnosis Line Atrial fibrillation with rapid ventricular response  Low voltage QRS  Nonspecific ST and T wave abnormality  Abnormal ECG  When compared with ECG of 14-JUN-2024 11:03,  Questionable change in QRS axis  Nonspecific T wave abnormality now evident in Inferior leads  Nonspecific T wave abnormality, worse in Anterolateral leads  Confirmed by Juventino Soto MD ([id number]) on 6/20/2024 12:52:09 PM (06-20-24 @ 11:37)       EXAM:  ECHO TTE WO CON COMP W DOPPLR         PROCEDURE DATE:  12/24/2019        INTERPRETATION:  INDICATION: Coronary artery disease    Blood Pressure 117/71    Height 187     Weight 93       BSA 2.2    Dimensions:    LA 4.2       Normal Values: 2.0 - 4.0 cm    Ao 4.0        Normal Values: 2.0 - 3.8 cm  SEPTUM 1.6       Normal Values: 0.6 - 1.2 cm  PWT 1.6       Normal Values: 0.6 - 1.1 cm  LVIDd 5.8         Normal Values: 3.0 - 5.6 cm  LVIDs 3.2         Normal Values: 1.8 - 4.0 cm    Derived Variables:  LVMI g/m2  RWT  Fractional Short  Ejection Fraction    Doppler Peak v. AoV= (m/sec)    OBSERVATIONS:    Mitral Valve: normal, mild MR.  Aortic Valve/Aorta: normal trileaflet aortic valve.  Tricuspid Valve: normal with trace TR.  Pulmonic Valve: normal  Left Atrium: Enlarged  Right Atrium: normal  Left Ventricle: Severe concentric LVH with normal systolic function, estimated LVEF of 65%.  Right Ventricle: normal size and systolic function.  Pericardium/Pleura: no significant pleural effusion, no significant pericardial effusion.  Pulmonary/RV Pressure: Inadequate TR jet to estimate PA systolic pressure  LV Diastolic Function: Grade 2diastolic dysfunction.    Severe concentric LVH, and mild LV enlargement, with normal systolic function, estimated LVEF of 65%. Normal right ventricular size and systolic function. Grade 2diastolic dysfunction. Left atrial enlargement The aortic root is normal in size. The mitral valve is structurally normal, mild MR. The aortic valve is trileaflet without stenosis or insufficiency. Trace physiologic TR. Inadequate TR jet to estimate PA systolic pressure No significant pericardial effusion.                  JUVENTINO SOTO M.D., ATTENDING CARDIOLOGIST  This document has been electronically signed. Dec 25 2019 11:45AM

## 2024-06-23 NOTE — PROGRESS NOTE ADULT - ASSESSMENT
Claude Villareal is a 54 YO M with past medical history of  AF on Eliquis, indwelling Aguilera, CAD s/p stents, CVA, DMT 2, Hypertension, osteomyelitis s/p debridement, PE, transferred from [facility] for difficulty in breathing.  His recent admission was for osteomyelitis and discharged on IV Vancomycin. On ED arrival he is unresponsive, apneic, no palpable pulse or blood pressure, EKG rhythm HR < 20/min W/O P waves. Patient intubated, Code ACLS activated, IV epinephrine, IV atropine administered, pulse became palpable, still Hypotensive, IV levophed administered. Admitted to ICU consulted,  Patient woke up and agitated try to pull out ETT,  IV propofol started, Cladue Villareal is a 54 YO M with past medical history of  AF on Eliquis, indwelling Aguilera, CAD s/p stents, CVA, DMT 2, Hypertension, osteomyelitis s/p debridement, PE, transferred from [facility] for difficulty in breathing.  His recent admission was for osteomyelitis and discharged on IV Vancomycin. On ED arrival he is unresponsive, apneic, no palpable pulse or blood pressure, EKG rhythm HR < 20/min W/O P waves. Patient intubated, Code ACLS activated, IV epinephrine, IV atropine administered, pulse became palpable, still Hypotensive, IV levophed administered. Admitted to ICU consulted,  Patient woke up and agitated try to pull out ETT,  IV propofol started,

## 2024-06-23 NOTE — PROGRESS NOTE ADULT - ASSESSMENT
54 YO M with past medical history of AFib (on Eliquis), indwelling Aguilera, cerebral aneurysm, CAD (s/p stents), CVA, T2DM, HTN, OM (s/p debridement), PE, perforated gastric ulcer s/p ometnopexy 2019 with Dr. Mejia, transferred from [facility] for difficulty in breathing.      CAD, Afib, HTN  - s/p cardiac arrest x2 w/ AHRF admitted to the ICU, sedated int/ext, weaned off   levophed and epinephrine,  downgraded to GMF (6/18)  - trops + but no sig trend. No clear evidence of acute ischemia  - continue asa and statin     - arrest and johnny issues. Per primary after review of records there was no double dosing of CCB as pt was not given Cardizem at facility.   - johnny possibly reflexive from resp failure.   - Telemetry atrial fibrillation 70-130s, rvr in the setting of missed av nodals  - did not get ccb 6/23 am due to soft bp  - repeating vitals now, if bp is improved would give ccb   - continue diltiazem 120mg Q8H and metoprolol 50mg q6hrs with hold parameters  - consider dig for rate control  - prn metoprolol ivp for hr > 140s   - continue Lovenox BID for AC    - TTE normal EF and mod-sev mr   - CXR on 6/14 with persistent mild left base infiltrate  - Repeat CXR unchanged   - no sign of volume overload on exam    - ATN with improving creat  - + MRSA, iso , rest management per primary    - Monitor and replete lytes, keep K>4, Mg>2.  - Will continue to follow.    Nery Alex NP  Nurse Practitioner- Cardiology   Call TEAMS 52 YO M with past medical history of AFib (on Eliquis), indwelling Aguilera, cerebral aneurysm, CAD (s/p stents), CVA, T2DM, HTN, OM (s/p debridement), PE, perforated gastric ulcer s/p ometnopexy 2019 with Dr. Mejia, transferred from [facility] for difficulty in breathing.      CAD, Afib, HTN  - s/p cardiac arrest x2 w/ AHRF admitted to the ICU, sedated int/ext, weaned off   levophed and epinephrine,  downgraded to GMF (6/18)  - trops + but no sig trend. No clear evidence of acute ischemia  - continue asa and statin     - arrest and johnny issues. Per primary after review of records there was no double dosing of CCB as pt was not given Cardizem at facility.   - johnny possibly reflexive from resp failure.   - Telemetry atrial fibrillation 70-130s, rvr in the setting of missed av nodals  - did not get ccb and bb 6/23 am due to soft bp  - continue diltiazem 120mg Q8H and metoprolol 50mg q6hrs with hold parameters  - consider dig for rate control  - prn metoprolol ivp for hr > 140s   - continue Lovenox BID for AC    - TTE normal EF and mod-sev mr   - CXR on 6/14 with persistent mild left base infiltrate  - Repeat CXR unchanged   - no sign of volume overload on exam    - ATN with improving creat  - + MRSA, iso , rest management per primary    - Monitor and replete lytes, keep K>4, Mg>2.  - Will continue to follow.    Nery Alex NP  Nurse Practitioner- Cardiology   Call TEAMS

## 2024-06-23 NOTE — PROGRESS NOTE ADULT - SUBJECTIVE AND OBJECTIVE BOX
Patient is a 53y old  Male who presents with a chief complaint of Acute respiratory failure with hypoxia (22 Jun 2024 18:13)      INTERVAL OVER NIGHT EVENTS:    MEDICATIONS  (STANDING):  aspirin enteric coated 81 milliGRAM(s) Oral daily  atorvastatin 40 milliGRAM(s) Oral at bedtime  benzocaine 20% Spray 1 Spray(s) Topical every 6 hours  calamine/zinc oxide Lotion 1 Application(s) Topical two times a day  chlorhexidine 2% Cloths 1 Application(s) Topical daily  clobetasol 0.05% Cream 1 Application(s) Topical two times a day  dextrose 10% Bolus 125 milliLiter(s) IV Bolus once  dextrose 5%. 1000 milliLiter(s) (50 mL/Hr) IV Continuous <Continuous>  dextrose 5%. 1000 milliLiter(s) (100 mL/Hr) IV Continuous <Continuous>  dextrose 50% Injectable 25 Gram(s) IV Push once  dextrose 50% Injectable 12.5 Gram(s) IV Push once  diltiazem    Tablet 120 milliGRAM(s) Oral every 8 hours  enoxaparin Injectable 100 milliGRAM(s) SubCutaneous every 12 hours  famotidine    Tablet 20 milliGRAM(s) Oral two times a day  gabapentin 100 milliGRAM(s) Oral every 12 hours  glucagon  Injectable 1 milliGRAM(s) IntraMuscular once  hydrOXYzine hydrochloride 25 milliGRAM(s) Oral two times a day  insulin lispro (ADMELOG) corrective regimen sliding scale   SubCutaneous three times a day before meals  metoprolol tartrate 50 milliGRAM(s) Oral every 6 hours  tiotropium 2.5 MICROgram(s)/olodaterol 2.5 MICROgram(s) (STIOLTO) Inhaler 2 Puff(s) Inhalation daily  vancomycin  IVPB 1000 milliGRAM(s) IV Intermittent every 12 hours    MEDICATIONS  (PRN):  acetaminophen     Tablet .. 650 milliGRAM(s) Oral every 6 hours PRN Mild Pain (1 - 3)  diphenhydrAMINE 25 milliGRAM(s) Oral every 4 hours PRN Rash and/or Itching  metoprolol tartrate Injectable 5 milliGRAM(s) IV Push every 6 hours PRN HR >140  oxyCODONE    IR 10 milliGRAM(s) Oral every 6 hours PRN Severe Pain (7 - 10)  oxyCODONE    IR 5 milliGRAM(s) Oral every 6 hours PRN Moderate Pain (4 - 6)  sodium chloride 0.9% lock flush 10 milliLiter(s) IV Push every 1 hour PRN Pre/post blood products, medications, blood draw, and to maintain line patency      Allergies    ertapenem (Blisters; Rash)  fish (Hives)    Intolerances        REVIEW OF SYSTEMS:  CONSTITUTIONAL: No fever, weight loss, or fatigue  EYES: No eye pain, visual disturbances, or discharge  ENMT:  No difficulty hearing, tinnitus, vertigo; No sinus or throat pain  NECK: No pain or stiffness  BREASTS: No pain, masses, or nipple discharge  RESPIRATORY: No cough, wheezing, chills or hemoptysis; No shortness of breath  CARDIOVASCULAR: No chest pain, palpitations, dizziness, or leg swelling  GASTROINTESTINAL: No abdominal or epigastric pain. No nausea, vomiting, or hematemesis; No diarrhea or constipation. No melena or hematochezia.  GENITOURINARY: No dysuria, frequency, hematuria, or incontinence  NEUROLOGICAL: No headaches, memory loss, loss of strength, numbness, or tremors  SKIN: No itching, burning, rashes, or lesions   LYMPH NODES: No enlarged glands  ENDOCRINE: No heat or cold intolerance; No hair loss  MUSCULOSKELETAL: No joint pain or swelling; No muscle, back, or extremity pain  PSYCHIATRIC: No depression, anxiety, mood swings, or difficulty sleeping  HEME/LYMPH: No easy bruising, or bleeding gums  ALLERGY AND IMMUNOLOGIC: No hives or eczema    Vital Signs Last 24 Hrs  T(C): 37.3 (23 Jun 2024 05:00), Max: 37.3 (23 Jun 2024 05:00)  T(F): 99.2 (23 Jun 2024 05:00), Max: 99.2 (23 Jun 2024 05:00)  HR: 74 (23 Jun 2024 05:00) (74 - 90)  BP: 92/56 (23 Jun 2024 05:00) (92/56 - 113/74)  BP(mean): --  RR: 18 (23 Jun 2024 05:00) (17 - 18)  SpO2: 94% (23 Jun 2024 05:00) (93% - 95%)    Parameters below as of 23 Jun 2024 05:00  Patient On (Oxygen Delivery Method): room air        PHYSICAL EXAM:  GENERAL: NAD, well-groomed, well-developed  HEAD:  Atraumatic, Normocephalic  EYES: EOMI, PERRLA, conjunctiva and sclera clear  ENMT: No tonsillar erythema, exudates, or enlargement; Moist mucous membranes, Good dentition, No lesions  NECK: Supple, No JVD, Normal thyroid  NERVOUS SYSTEM:  Alert & Oriented X3, Motor Strength 5/5 B/L upper and lower extremities; DTRs 2+ intact and symmetric  CHEST/LUNG: Clear to percussion bilaterally; No rales, rhonchi, wheezing, or rubs  HEART: Regular rate and rhythm; No murmurs, rubs, or gallops  ABDOMEN: Soft, Nontender, Nondistended; Bowel sounds present  EXTREMITIES:  2+ Peripheral Pulses, No clubbing, cyanosis, or edema  LYMPH: No lymphadenopathy noted  SKIN: No rashes or lesions    LABS:                        10.4   6.63  )-----------( 192      ( 22 Jun 2024 14:08 )             32.2     06-22    136  |  108  |  7   ----------------------------<  211<H>  3.5   |  23  |  1.10    Ca    8.2<L>      22 Jun 2024 14:08    TPro  5.5<L>  /  Alb  1.9<L>  /  TBili  0.4  /  DBili  x   /  AST  18  /  ALT  51  /  AlkPhos  90  06-22      Urinalysis Basic - ( 22 Jun 2024 14:08 )    Color: x / Appearance: x / SG: x / pH: x  Gluc: 211 mg/dL / Ketone: x  / Bili: x / Urobili: x   Blood: x / Protein: x / Nitrite: x   Leuk Esterase: x / RBC: x / WBC x   Sq Epi: x / Non Sq Epi: x / Bacteria: x      CAPILLARY BLOOD GLUCOSE      POCT Blood Glucose.: 157 mg/dL (23 Jun 2024 08:13)  POCT Blood Glucose.: 180 mg/dL (22 Jun 2024 21:23)  POCT Blood Glucose.: 170 mg/dL (22 Jun 2024 17:06)  POCT Blood Glucose.: 201 mg/dL (22 Jun 2024 12:05)      RADIOLOGY & ADDITIONAL TESTS:    Imaging Personally Reviewed:  [ ] YES  [ ] NO    Consultant(s) Notes Reviewed:  [ ] YES  [ ] NO    Care Discussed with Consultants/Other Providers [ ] YES  [ ] NO

## 2024-06-24 LAB
ALBUMIN SERPL ELPH-MCNC: 2 G/DL — LOW (ref 3.3–5)
ALP SERPL-CCNC: 92 U/L — SIGNIFICANT CHANGE UP (ref 40–120)
ALT FLD-CCNC: 34 U/L — SIGNIFICANT CHANGE UP (ref 12–78)
ANION GAP SERPL CALC-SCNC: 9 MMOL/L — SIGNIFICANT CHANGE UP (ref 5–17)
AST SERPL-CCNC: 13 U/L — LOW (ref 15–37)
BILIRUB SERPL-MCNC: 0.3 MG/DL — SIGNIFICANT CHANGE UP (ref 0.2–1.2)
BUN SERPL-MCNC: 9 MG/DL — SIGNIFICANT CHANGE UP (ref 7–23)
CALCIUM SERPL-MCNC: 8.3 MG/DL — LOW (ref 8.5–10.1)
CHLORIDE SERPL-SCNC: 102 MMOL/L — SIGNIFICANT CHANGE UP (ref 96–108)
CO2 SERPL-SCNC: 25 MMOL/L — SIGNIFICANT CHANGE UP (ref 22–31)
CREAT SERPL-MCNC: 1.2 MG/DL — SIGNIFICANT CHANGE UP (ref 0.5–1.3)
EGFR: 71 ML/MIN/1.73M2 — SIGNIFICANT CHANGE UP
GLUCOSE BLDC GLUCOMTR-MCNC: 174 MG/DL — HIGH (ref 70–99)
GLUCOSE BLDC GLUCOMTR-MCNC: 190 MG/DL — HIGH (ref 70–99)
GLUCOSE BLDC GLUCOMTR-MCNC: 194 MG/DL — HIGH (ref 70–99)
GLUCOSE BLDC GLUCOMTR-MCNC: 194 MG/DL — HIGH (ref 70–99)
GLUCOSE SERPL-MCNC: 195 MG/DL — HIGH (ref 70–99)
HCT VFR BLD CALC: 33.8 % — LOW (ref 39–50)
HGB BLD-MCNC: 10.5 G/DL — LOW (ref 13–17)
MCHC RBC-ENTMCNC: 29.7 PG — SIGNIFICANT CHANGE UP (ref 27–34)
MCHC RBC-ENTMCNC: 31.1 GM/DL — LOW (ref 32–36)
MCV RBC AUTO: 95.5 FL — SIGNIFICANT CHANGE UP (ref 80–100)
NRBC # BLD: 0 /100 WBCS — SIGNIFICANT CHANGE UP (ref 0–0)
PLATELET # BLD AUTO: 334 K/UL — SIGNIFICANT CHANGE UP (ref 150–400)
POTASSIUM SERPL-MCNC: 3.8 MMOL/L — SIGNIFICANT CHANGE UP (ref 3.5–5.3)
POTASSIUM SERPL-SCNC: 3.8 MMOL/L — SIGNIFICANT CHANGE UP (ref 3.5–5.3)
PROT SERPL-MCNC: 6.1 G/DL — SIGNIFICANT CHANGE UP (ref 6–8.3)
RBC # BLD: 3.54 M/UL — LOW (ref 4.2–5.8)
RBC # FLD: 14.4 % — SIGNIFICANT CHANGE UP (ref 10.3–14.5)
SODIUM SERPL-SCNC: 136 MMOL/L — SIGNIFICANT CHANGE UP (ref 135–145)
VANCOMYCIN TROUGH SERPL-MCNC: 19.6 UG/ML — SIGNIFICANT CHANGE UP (ref 10–20)
WBC # BLD: 9.99 K/UL — SIGNIFICANT CHANGE UP (ref 3.8–10.5)
WBC # FLD AUTO: 9.99 K/UL — SIGNIFICANT CHANGE UP (ref 3.8–10.5)

## 2024-06-24 PROCEDURE — 99232 SBSQ HOSP IP/OBS MODERATE 35: CPT

## 2024-06-24 RX ORDER — FUROSEMIDE 10 MG/ML
20 INJECTION, SOLUTION INTRAMUSCULAR; INTRAVENOUS DAILY
Refills: 0 | Status: DISCONTINUED | OUTPATIENT
Start: 2024-06-24 | End: 2024-06-27

## 2024-06-24 RX ORDER — VANCOMYCIN HYDROCHLORIDE 50 MG/ML
750 KIT ORAL EVERY 12 HOURS
Refills: 0 | Status: DISCONTINUED | OUTPATIENT
Start: 2024-06-24 | End: 2024-06-28

## 2024-06-24 RX ORDER — POTASSIUM CHLORIDE 600 MG/1
10 TABLET, FILM COATED, EXTENDED RELEASE ORAL DAILY
Refills: 0 | Status: DISCONTINUED | OUTPATIENT
Start: 2024-06-24 | End: 2024-07-02

## 2024-06-24 RX ADMIN — ENOXAPARIN SODIUM 100 MILLIGRAM(S): 100 INJECTION SUBCUTANEOUS at 06:19

## 2024-06-24 RX ADMIN — OXYCODONE HYDROCHLORIDE 10 MILLIGRAM(S): 100 SOLUTION ORAL at 06:22

## 2024-06-24 RX ADMIN — Medication 20 MILLIGRAM(S): at 17:17

## 2024-06-24 RX ADMIN — Medication 25 MILLIGRAM(S): at 13:09

## 2024-06-24 RX ADMIN — OXYCODONE HYDROCHLORIDE 10 MILLIGRAM(S): 100 SOLUTION ORAL at 23:16

## 2024-06-24 RX ADMIN — Medication 50 MILLIGRAM(S): at 00:51

## 2024-06-24 RX ADMIN — CLOBETASOL PROPIONATE 1 APPLICATION(S): 0.5 CREAM TOPICAL at 17:37

## 2024-06-24 RX ADMIN — OXYCODONE HYDROCHLORIDE 10 MILLIGRAM(S): 100 SOLUTION ORAL at 22:48

## 2024-06-24 RX ADMIN — INSULIN LISPRO 2: 100 INJECTION, SOLUTION SUBCUTANEOUS at 17:18

## 2024-06-24 RX ADMIN — Medication 20 MILLIGRAM(S): at 06:18

## 2024-06-24 RX ADMIN — OXYCODONE HYDROCHLORIDE 10 MILLIGRAM(S): 100 SOLUTION ORAL at 07:22

## 2024-06-24 RX ADMIN — Medication 50 MILLIGRAM(S): at 11:12

## 2024-06-24 RX ADMIN — INSULIN LISPRO 2: 100 INJECTION, SOLUTION SUBCUTANEOUS at 08:28

## 2024-06-24 RX ADMIN — DILTIAZEM HYDROCHLORIDE 120 MILLIGRAM(S): 240 CAPSULE, EXTENDED RELEASE ORAL at 13:09

## 2024-06-24 RX ADMIN — HYDROXYZINE PAMOATE 25 MILLIGRAM(S): 50 CAPSULE ORAL at 17:17

## 2024-06-24 RX ADMIN — Medication 50 MILLIGRAM(S): at 17:18

## 2024-06-24 RX ADMIN — Medication 25 MILLIGRAM(S): at 06:18

## 2024-06-24 RX ADMIN — VANCOMYCIN HYDROCHLORIDE 250 MILLIGRAM(S): KIT at 23:56

## 2024-06-24 RX ADMIN — Medication 50 MILLIGRAM(S): at 06:17

## 2024-06-24 RX ADMIN — DILTIAZEM HYDROCHLORIDE 120 MILLIGRAM(S): 240 CAPSULE, EXTENDED RELEASE ORAL at 22:43

## 2024-06-24 RX ADMIN — Medication 100 MILLIGRAM(S): at 17:18

## 2024-06-24 RX ADMIN — Medication 100 MILLIGRAM(S): at 06:28

## 2024-06-24 RX ADMIN — HYDROXYZINE PAMOATE 25 MILLIGRAM(S): 50 CAPSULE ORAL at 06:19

## 2024-06-24 RX ADMIN — TIOTROPIUM BROMIDE AND OLODATEROL 2 PUFF(S): 3.124; 2.736 SPRAY, METERED RESPIRATORY (INHALATION) at 06:48

## 2024-06-24 RX ADMIN — Medication 1 APPLICATION(S): at 12:05

## 2024-06-24 RX ADMIN — POTASSIUM CHLORIDE 10 MILLIEQUIVALENT(S): 600 TABLET, FILM COATED, EXTENDED RELEASE ORAL at 12:05

## 2024-06-24 RX ADMIN — Medication 1 APPLICATION(S): at 17:36

## 2024-06-24 RX ADMIN — ATORVASTATIN CALCIUM 40 MILLIGRAM(S): 20 TABLET, FILM COATED ORAL at 22:43

## 2024-06-24 RX ADMIN — Medication 50 MILLIGRAM(S): at 22:43

## 2024-06-24 RX ADMIN — Medication 1 APPLICATION(S): at 06:19

## 2024-06-24 RX ADMIN — ASPIRIN 81 MILLIGRAM(S): 325 TABLET, FILM COATED ORAL at 11:12

## 2024-06-24 RX ADMIN — ENOXAPARIN SODIUM 100 MILLIGRAM(S): 100 INJECTION SUBCUTANEOUS at 17:17

## 2024-06-24 RX ADMIN — Medication 25 MILLIGRAM(S): at 22:43

## 2024-06-24 RX ADMIN — INSULIN LISPRO 2: 100 INJECTION, SOLUTION SUBCUTANEOUS at 13:09

## 2024-06-24 RX ADMIN — CLOBETASOL PROPIONATE 1 APPLICATION(S): 0.5 CREAM TOPICAL at 06:29

## 2024-06-24 RX ADMIN — DILTIAZEM HYDROCHLORIDE 120 MILLIGRAM(S): 240 CAPSULE, EXTENDED RELEASE ORAL at 06:17

## 2024-06-24 RX ADMIN — OXYCODONE HYDROCHLORIDE 10 MILLIGRAM(S): 100 SOLUTION ORAL at 13:09

## 2024-06-24 RX ADMIN — VANCOMYCIN HYDROCHLORIDE 250 MILLIGRAM(S): KIT at 08:21

## 2024-06-24 NOTE — PROGRESS NOTE ADULT - SUBJECTIVE AND OBJECTIVE BOX
[facility] -- Brittany Lutz Pannella, Patel, Savella Goodger, Cohen  Office # [phone]      Follow Up:  hypoxia     Subjective/Observations:     No events overnight resting comfortably in bed.  No complaints of chest pain, dyspnea, or palpitations reported. No signs of orthopnea or PND.    REVIEW OF SYSTEMS: All other review of systems is negative unless indicated above    PAST MEDICAL & SURGICAL HISTORY:  Diabetes      Diabetes mellitus with no complication      Afib      Hypertension      BPH (benign prostatic hyperplasia)      Perforated gastric ulcer  s/p emergent ex-lap omentopexy and plication 2019      Pulmonary embolism      History of non-ST elevation myocardial infarction (NSTEMI)      Osteomyelitis  s/p debridement      CAD S/P percutaneous coronary angioplasty      Cerebrovascular accident      H/O abdominal surgery      Perforated gastric ulcer      Traumatic amputation of left foot, initial encounter          MEDICATIONS  (STANDING):  aspirin enteric coated 81 milliGRAM(s) Oral daily  atorvastatin 40 milliGRAM(s) Oral at bedtime  benzocaine 20% Spray 1 Spray(s) Topical every 6 hours  calamine/zinc oxide Lotion 1 Application(s) Topical two times a day  chlorhexidine 2% Cloths 1 Application(s) Topical daily  clobetasol 0.05% Cream 1 Application(s) Topical two times a day  dextrose 10% Bolus 125 milliLiter(s) IV Bolus once  dextrose 5%. 1000 milliLiter(s) (50 mL/Hr) IV Continuous <Continuous>  dextrose 5%. 1000 milliLiter(s) (100 mL/Hr) IV Continuous <Continuous>  dextrose 50% Injectable 25 Gram(s) IV Push once  dextrose 50% Injectable 12.5 Gram(s) IV Push once  diltiazem    Tablet 120 milliGRAM(s) Oral every 8 hours  enoxaparin Injectable 100 milliGRAM(s) SubCutaneous every 12 hours  famotidine    Tablet 20 milliGRAM(s) Oral two times a day  gabapentin 100 milliGRAM(s) Oral every 12 hours  glucagon  Injectable 1 milliGRAM(s) IntraMuscular once  hydrOXYzine hydrochloride 25 milliGRAM(s) Oral two times a day  insulin lispro (ADMELOG) corrective regimen sliding scale   SubCutaneous three times a day before meals  metoprolol tartrate 50 milliGRAM(s) Oral every 6 hours  tiotropium 2.5 MICROgram(s)/olodaterol 2.5 MICROgram(s) (STIOLTO) Inhaler 2 Puff(s) Inhalation daily  vancomycin  IVPB 1000 milliGRAM(s) IV Intermittent every 12 hours    MEDICATIONS  (PRN):  acetaminophen     Tablet .. 650 milliGRAM(s) Oral every 6 hours PRN Mild Pain (1 - 3)  diphenhydrAMINE 25 milliGRAM(s) Oral every 4 hours PRN Rash and/or Itching  metoprolol tartrate Injectable 5 milliGRAM(s) IV Push every 6 hours PRN HR >140  oxyCODONE    IR 10 milliGRAM(s) Oral every 6 hours PRN Severe Pain (7 - 10)  oxyCODONE    IR 5 milliGRAM(s) Oral every 6 hours PRN Moderate Pain (4 - 6)  sodium chloride 0.9% lock flush 10 milliLiter(s) IV Push every 1 hour PRN Pre/post blood products, medications, blood draw, and to maintain line patency      Allergies    ertapenem (Blisters; Rash)  fish (Hives)    Intolerances        Vital Signs Last 24 Hrs  T(C): 37.1 (2024 04:50), Max: 37.3 (2024 12:04)  T(F): 98.7 (2024 04:50), Max: 99.2 (2024 14:19)  HR: 73 (2024 04:50) (73 - 113)  BP: 98/53 (2024 04:50) (92/60 - 127/82)  BP(mean): --  RR: 18 (2024 12:04) (18 - 18)  SpO2: 91% (2024 21:25) (91% - 95%)    Parameters below as of 2024 21:25  Patient On (Oxygen Delivery Method): room air        I&O's Summary    2024 07:01  -  2024 07:00  --------------------------------------------------------  IN: 250 mL / OUT: 0 mL / NET: 250 mL          PHYSICAL EXAM:  TELE: Af  Constitutional: NAD, awake and alert,   HEENT: Moist Mucous Membranes, Anicteric  Pulmonary: Non-labored, breath sounds are clear bilaterally, No wheezing, rales or rhonchi  Cardiovascular: IRRegular, S1 and S2, No murmurs, No rubs, gallops or clicks  Gastrointestinal:  soft, nontender, nondistended   Lymph: + peripheral edema.  R leg dsg intact  Skin: + rashes     LABS: All Labs Reviewed:                        10.4   6.63  )-----------( 192      ( 2024 14:08 )             32.2     2024 14:08    136    |  108    |  7      ----------------------------<  211    3.5     |  23     |  1.10     Ca    8.2        2024 14:08    TPro  5.5    /  Alb  1.9    /  TBili  0.4    /  DBili  x      /  AST  18     /  ALT  51     /  AlkPhos  90     2024 14:08             EC Lead ECG:   Ventricular Rate 126 BPM    QRS Duration 76 ms    Q-T Interval 302 ms    QTC Calculation(Bazett) 437 ms    R Axis -12 degrees    T Axis 103 degrees    Diagnosis Line Atrial fibrillation with rapid ventricular response  Low voltage QRS  Nonspecific ST and T wave abnormality  Abnormal ECG  When compared with ECG of 2024 11:03,  Questionable change in QRS axis  Nonspecific T wave abnormality now evident in Inferior leads  Nonspecific T wave abnormality, worse in Anterolateral leads  Confirmed by Juventino Soto MD ([id number]) on 2024 12:52:09 PM (24 @ 11:37)      TRANSTHORACIC ECHOCARDIOGRAM REPORT  ________________________________________________________________________________                                      _______       Pt. Name:       SARAH MORRISON Study Date:    2024  MRN:            [id number]         YOB: 1970  Accession #:    [id number]        Age:           53 years  Account#:       [id number]       Gender:        M  Visit ID#  Heart Rate:                      Height:        72.05 in (183.00 cm)  Rhythm:          Weight:        222.66 lb (101.00 kg)  Blood Pressure: 88/53 mmHg       BSA/BMI:       2.23 m² / 30.16 kg/m²  ________________________________________________________________________________________  Referring Physician:    [id number] Hill Brizuela  Interpreting Physician: Rahel Boss MD  Primary Sonographer:    Mounika FELDMAN    CPT:               ECHO TTE WO CON COMP W DOPP - 95568.m  Indication(s):     Heart failure, unspecified - I50.9  Procedure:         Transthoracic echocardiogram with 2-D, M-mode and complete                     spectral and color flow Doppler.  Ordering Location: ICU1  Admission Status:  Inpatient    _______________________________________________________________________________________     CONCLUSIONS:      1. Left ventricular cavity is normal in size. Left ventricular systolic function is normal with an ejection fraction visually estimated at 55 to 60 %. There are no regional wall motion abnormalities seen.   2. Moderate to severe left ventricular hypertrophy.   3. Normal right ventricular cavity size, with normal wall thickness, and normal systolic function.   4. The left atrium is severely dilated.   5. The right atrium is severely dilated.   6. Trileaflet aortic valve with normal systolic excursion. There is focal calcification of the aortic valve leaflets.   7. Mild to moderate mitral regurgitation.   8. There is moderate calcification of the mitral valve annulus.   9. Structurally normal tricuspid valve with normal leaflet excursion.Mild tricuspid regurgitation.  10. The inferior vena cava is normal in size measuring 1.70 cm in diameter, (normal <2.1cm) with normal inspiratory collapse (normal >50%) consistent with normal right atrial pressure (~3, range 0-5mmHg).  11. Estimatedpulmonary artery systolic pressure is 43 mmHg.    ________________________________________________________________________________________  FINDINGS:     Left Ventricle:  The left ventricular cavity is normal in size. Left ventricular systolic function is normal with an ejection fraction visually estimated at 55 to 60%. There are no regional wall motion abnormalities seen. There is normal left ventricular diastolic function, with normal filling pressure. Moderate to severe left ventricular hypertrophy.     Right Ventricle:  The right ventricular cavity is normal in size, with normal wall thickness and normal systolic function.     Left Atrium:  The left atrium is severely dilated.     Right Atrium:  The right atrium is severely dilated.     Aortic Valve:  The aortic valve appears trileaflet with normal systolic excursion. There is focal calcification of the aortic valve leaflets. There is no aortic valve stenosis. There is no evidence of aortic regurgitation.     Mitral Valve:  There is moderate calcification of the mitral valve annulus. There is mild to moderate mitral regurgitation.     Tricuspid Valve:  Structurally normal tricuspid valve with normal leaflet excursion. There is mild tricuspid regurgitation. Estimated pulmonary artery systolic pressure is 43 mmHg.     Pulmonic Valve:  The pulmonic valve was not well visualized.     Systemic Veins:  The inferior vena cava is normal in size measuring 1.70 cm in diameter, (normal <2.1cm) with normal inspiratory collapse (normal >50%) consistent with normal right atrial pressure (~3, range 0-5mmHg).  ____________________________________________________________________  QUANTITATIVE DATA:  Left Ventricle Measurements: (Indexed to BSA)     IVSd (2D):   1.7 cm  LVPWd (2D):  1.4 cm  LVIDd (2D):  4.3 cm  LVIDs (2D):  3.0 cm  LV Mass:     281 g  125.9 g/m²  Visualized LV EF%: 55 to 60%     MV E Vmax:    1.07 m/s  MV A Vmax:    0.00 m/s  e' lateral:   9.68 cm/s  e' medial:    10.90 cm/s  E/e' lateral: 11.05  E/e' medial:  9.82  E/e' Average: 10.40  MV DT:        180 msec    Aorta Measurements: (Normal range) (Indexed to BSA)     Sinuses of Valsalva: 3.40 cm (3.1 - 3.7 cm)       Left Atrium Measurements: (Indexed to BSA)  LA Diam 2D: 4.00 cm       LVOT / RVOT/ Qp/Qs Data: (Indexed to BSA)  LVOT Diameter: 2.10 cm  LVOT Area:     3.46 cm²    Mitral Valve Measurements:     MV E Vmax: 1.1 m/s  MV A Vmax: 0.0 m/s       Tricuspid Valve Measurements:     TR Vmax:          3.2 m/s  TR Peak Gradient: 40.4 mmHg  RA Pressure:      3 mmHg  PASP:             43 mmHg    ________________________________________________________________________________________  Electronically signed on 2024 at 12:08:59 PM by Rahel Boss MD         *** Final ***      Radiology:

## 2024-06-24 NOTE — SOCIAL WORK PROGRESS NOTE - NSSWPROGRESSNOTE_GEN_ALL_CORE
Discussed today at rounds and with MD who confirmed patient is not ready for d/c , still having tests done. SHAWNA was requested.   Plan remains to return to [facility] where he is along term resident upon d/c. Social work to follow.

## 2024-06-24 NOTE — PROGRESS NOTE ADULT - ASSESSMENT
52 YO M with past medical history of AFib (on Eliquis), indwelling Aguilera, cerebral aneurysm, CAD (s/p stents), CVA, T2DM, HTN, OM (s/p debridement), PE, perforated gastric ulcer s/p ometnopexy 2019 with Dr. Mejia, transferred from [facility] for difficulty in breathing.      CAD, Afib, HTN  - s/p cardiac arrest x2 w/ AHRF admitted to the ICU, sedated int/ext, weaned off   levophed and epinephrine,  downgraded to GMF (6/18)  - trops + but no sig trend. No clear evidence of acute ischemia  - continue asa and statin      - Telemetry atrial fibrillation 70-80 no events overnight can dc tele   - continue cardizem and vv  -on fd lovenox     - TTE normal EF and mod-sev mr   - CXR on 6/14 with persistent mild left base infiltrate  - Repeat CXR unchanged   - no sign of volume overload on exam    - ATN with improving creat  - + MRSA, iso , rest management per primary    - Monitor and replete lytes, keep K>4, Mg>2.  - Will continue to follow.

## 2024-06-24 NOTE — PROGRESS NOTE ADULT - ASSESSMENT
52 YO M with  AF on Eliquis, indwelling Aguilera, CAD s/p stents, CVA, DMT 2, Hypertension, osteomyelitis s/p debridement, PE, transferred from [facility] for difficulty in breathing.  His recent admission was for osteomyelitis and discharged on IV Vancomycin. Reported at [facility] he was given a dose of ertapenem and developed difficulty breathing followed by apnea. On ED arrival he was unresponsive, apneic, no palpable pulse or blood pressure, EKG rhythm HR < 20/min W/O P waves. Patient intubated,     RECOMMENDATIONS  1-Asystolic cardiac arrest, Acute hypoxic respiratory failure, Shock  - unclear etiology  -rec avoiding carbapenems and fine with just Vanco  -ICU support and monitoring    2-Osteomyeliits right calcaneous  6/4 S/p Selective debridement of wound 04-Jun-2024 10:52:57  Parviz Todd  6/4  TCx MRSA, Escherichia coli ESBL  Right calcaneus bone pathology:  -   Fragments of bone with chronic osteomyelitis.  -   Focal acute osteomyelitis cannot be ruled out.  C/w vancomycin 750 mg IV q12, goal trough 15-20, dosing per pharmacy protocol and plan had been with inability to rule out osteo rec 6 weeks so last day 7/18  PICC placed 6/7    From an ID standpoint no further requirement for inpatient status for the management of ID issues. Fine with discharge from ID standpoint when other medical issues no longer require inpatient care and social issues allow for a safe discharge plan. To schedule an outpatient ID follow up appointment please call our office at [phone]    Thank you for consulting us and involving us in the management of this most interesting and challenging case.  We will follow along in the care of this patient. Please call us at [phone] or text me directly on my cell# at [phone] with any concerns.

## 2024-06-24 NOTE — PROGRESS NOTE ADULT - ASSESSMENT
BOI: ATN  Hypokalemia  Metabolic acidosis: OBI, Lactic acidosis  Diabetes  Atrial fibrillation  s/p Cardiac arrest    Stable renal indies. To continue current meds. Monitor BP trend. Titrate BP meds as needed.    Monitor blood sugar levels. Insulin coverage as needed. Avoid nephrotoxic meds as possible.   Will follow electrolytes and renal function trend.

## 2024-06-24 NOTE — PROGRESS NOTE ADULT - SUBJECTIVE AND OBJECTIVE BOX
Patient is a 53y old  Male who presents with a chief complaint of Acute respiratory failure with hypoxia (17 Jun 2024 10:29)  Patient seen in follow up for OBI.        PAST MEDICAL HISTORY:  No pertinent past medical history    Diabetes    No pertinent past medical history    Diabetes mellitus with no complication    Afib    Hypertension    BPH (benign prostatic hyperplasia)    Perforated gastric ulcer    Pulmonary embolism    History of non-ST elevation myocardial infarction (NSTEMI)    Osteomyelitis    CAD S/P percutaneous coronary angioplasty    Cerebrovascular accident      MEDICATIONS  (STANDING):  aspirin enteric coated 81 milliGRAM(s) Oral daily  atorvastatin 40 milliGRAM(s) Oral at bedtime  benzocaine 20% Spray 1 Spray(s) Topical every 6 hours  calamine/zinc oxide Lotion 1 Application(s) Topical two times a day  chlorhexidine 2% Cloths 1 Application(s) Topical daily  clobetasol 0.05% Cream 1 Application(s) Topical two times a day  dextrose 10% Bolus 125 milliLiter(s) IV Bolus once  dextrose 5%. 1000 milliLiter(s) (50 mL/Hr) IV Continuous <Continuous>  dextrose 5%. 1000 milliLiter(s) (100 mL/Hr) IV Continuous <Continuous>  dextrose 50% Injectable 25 Gram(s) IV Push once  dextrose 50% Injectable 12.5 Gram(s) IV Push once  diltiazem    Tablet 120 milliGRAM(s) Oral every 8 hours  enoxaparin Injectable 100 milliGRAM(s) SubCutaneous every 12 hours  famotidine    Tablet 20 milliGRAM(s) Oral two times a day  gabapentin 100 milliGRAM(s) Oral every 12 hours  glucagon  Injectable 1 milliGRAM(s) IntraMuscular once  hydrOXYzine hydrochloride 25 milliGRAM(s) Oral two times a day  insulin lispro (ADMELOG) corrective regimen sliding scale   SubCutaneous three times a day before meals  metoprolol tartrate 50 milliGRAM(s) Oral every 6 hours  potassium chloride    Tablet ER 10 milliEquivalent(s) Oral daily  tiotropium 2.5 MICROgram(s)/olodaterol 2.5 MICROgram(s) (STIOLTO) Inhaler 2 Puff(s) Inhalation daily  vancomycin  IVPB 750 milliGRAM(s) IV Intermittent every 12 hours    MEDICATIONS  (PRN):  acetaminophen     Tablet .. 650 milliGRAM(s) Oral every 6 hours PRN Mild Pain (1 - 3)  diphenhydrAMINE 25 milliGRAM(s) Oral every 4 hours PRN Rash and/or Itching  metoprolol tartrate Injectable 5 milliGRAM(s) IV Push every 6 hours PRN HR >140  oxyCODONE    IR 10 milliGRAM(s) Oral every 6 hours PRN Severe Pain (7 - 10)  oxyCODONE    IR 5 milliGRAM(s) Oral every 6 hours PRN Moderate Pain (4 - 6)  sodium chloride 0.9% lock flush 10 milliLiter(s) IV Push every 1 hour PRN Pre/post blood products, medications, blood draw, and to maintain line patency    T(C): 36.9 (06-24-24 @ 11:55), Max: 37.3 (06-23-24 @ 05:00)  HR: 73 (06-24-24 @ 11:55) (73 - 113)  BP: 98/76 (06-24-24 @ 11:55) (92/56 - 127/82)  RR: 17 (06-24-24 @ 11:55)  SpO2: 97% (06-24-24 @ 11:55)  Wt(kg): --  I&O's Detail    23 Jun 2024 07:01  -  24 Jun 2024 07:00  --------------------------------------------------------  IN:    IV PiggyBack: 250 mL  Total IN: 250 mL    OUT:  Total OUT: 0 mL    Total NET: 250 mL      24 Jun 2024 07:01  -  24 Jun 2024 14:38  --------------------------------------------------------  IN:  Total IN: 0 mL    OUT:    Indwelling Catheter - Urethral (mL): 1500 mL  Total OUT: 1500 mL    Total NET: -1500 mL                    PHYSICAL EXAM:  General: No distress  Respiratory: b/l air entry  Cardiovascular: S1 S2  Gastrointestinal: soft  Extremities:  Left TMA, Chronic skin changes           LABORATORY:                        10.5   9.99  )-----------( 334      ( 24 Jun 2024 12:40 )             33.8     06-24    136  |  102  |  9   ----------------------------<  195<H>  3.8   |  25  |  1.20    Ca    8.3<L>      24 Jun 2024 12:40    TPro  6.1  /  Alb  2.0<L>  /  TBili  0.3  /  DBili  x   /  AST  13<L>  /  ALT  34  /  AlkPhos  92  06-24    Sodium: 136 mmol/L (06-24 @ 12:40)    Potassium: 3.8 mmol/L (06-24 @ 12:40)    Hemoglobin: 10.5 g/dL (06-24 @ 12:40)  Hemoglobin: 10.4 g/dL (06-22 @ 14:08)    Creatinine, Serum 1.20 (06-24 @ 12:40)  Creatinine, Serum 1.10 (06-22 @ 14:08)        LIVER FUNCTIONS - ( 24 Jun 2024 12:40 )  Alb: 2.0 g/dL / Pro: 6.1 g/dL / ALK PHOS: 92 U/L / ALT: 34 U/L / AST: 13 U/L / GGT: x           Urinalysis Basic - ( 24 Jun 2024 12:40 )    Color: x / Appearance: x / SG: x / pH: x  Gluc: 195 mg/dL / Ketone: x  / Bili: x / Urobili: x   Blood: x / Protein: x / Nitrite: x   Leuk Esterase: x / RBC: x / WBC x   Sq Epi: x / Non Sq Epi: x / Bacteria: x

## 2024-06-24 NOTE — PROGRESS NOTE ADULT - SUBJECTIVE AND OBJECTIVE BOX
[facility] of INFECTIOUS DISEASE  Juventino Whitten MD PhD, Symone Hickey MD, Anne-Marie Li MD, Jeffery Jacobs MD, Ryan Romeo MD  and providing coverage with Melody Armstrong MD  Providing Infectious Disease Consultations at [facility], [facility], [facility]'s    Office# [phone] to schedule follow up appointments  Answering Service for urgent calls or New Consults [phone]  Cell# to text for urgent issues Juventino Whitten [phone]     infectious diseases progress note:    SARAH MORRISON is a 54y y. o. Male patient    Overnight and events of the last 24hrs reviewed    Allergies    ertapenem (Blisters; Rash)  fish (Hives)    Intolerances        ANTIBIOTICS/RELEVANT:  antimicrobials  vancomycin  IVPB 750 milliGRAM(s) IV Intermittent every 12 hours    immunologic:    OTHER:  acetaminophen     Tablet .. 650 milliGRAM(s) Oral every 6 hours PRN  aspirin enteric coated 81 milliGRAM(s) Oral daily  atorvastatin 40 milliGRAM(s) Oral at bedtime  benzocaine 20% Spray 1 Spray(s) Topical every 6 hours  calamine/zinc oxide Lotion 1 Application(s) Topical two times a day  chlorhexidine 2% Cloths 1 Application(s) Topical daily  clobetasol 0.05% Cream 1 Application(s) Topical two times a day  dextrose 10% Bolus 125 milliLiter(s) IV Bolus once  dextrose 5%. 1000 milliLiter(s) IV Continuous <Continuous>  dextrose 5%. 1000 milliLiter(s) IV Continuous <Continuous>  dextrose 50% Injectable 25 Gram(s) IV Push once  dextrose 50% Injectable 12.5 Gram(s) IV Push once  diltiazem    Tablet 120 milliGRAM(s) Oral every 8 hours  diphenhydrAMINE 25 milliGRAM(s) Oral every 4 hours PRN  enoxaparin Injectable 100 milliGRAM(s) SubCutaneous every 12 hours  famotidine    Tablet 20 milliGRAM(s) Oral two times a day  gabapentin 100 milliGRAM(s) Oral every 12 hours  glucagon  Injectable 1 milliGRAM(s) IntraMuscular once  hydrOXYzine hydrochloride 25 milliGRAM(s) Oral two times a day  insulin lispro (ADMELOG) corrective regimen sliding scale   SubCutaneous three times a day before meals  metoprolol tartrate 50 milliGRAM(s) Oral every 6 hours  metoprolol tartrate Injectable 5 milliGRAM(s) IV Push every 6 hours PRN  oxyCODONE    IR 5 milliGRAM(s) Oral every 6 hours PRN  oxyCODONE    IR 10 milliGRAM(s) Oral every 6 hours PRN  potassium chloride    Tablet ER 10 milliEquivalent(s) Oral daily  sodium chloride 0.9% lock flush 10 milliLiter(s) IV Push every 1 hour PRN  tiotropium 2.5 MICROgram(s)/olodaterol 2.5 MICROgram(s) (STIOLTO) Inhaler 2 Puff(s) Inhalation daily      Objective:  Vital Signs Last 24 Hrs  T(C): 36.9 (24 Jun 2024 11:55), Max: 37.1 (24 Jun 2024 04:50)  T(F): 98.5 (24 Jun 2024 11:55), Max: 98.7 (24 Jun 2024 04:50)  HR: 73 (24 Jun 2024 11:55) (73 - 73)  BP: 98/76 (24 Jun 2024 11:55) (92/60 - 112/73)  BP(mean): --  RR: 17 (24 Jun 2024 11:55) (17 - 17)  SpO2: 97% (24 Jun 2024 11:55) (91% - 97%)    Parameters below as of 24 Jun 2024 11:55  Patient On (Oxygen Delivery Method): room air        T(C): 36.9 (06-24-24 @ 11:55), Max: 37.3 (06-23-24 @ 05:00)  T(C): 36.9 (06-24-24 @ 11:55), Max: 37.3 (06-23-24 @ 05:00)  T(C): 36.9 (06-24-24 @ 11:55), Max: 37.3 (06-23-24 @ 05:00)    PHYSICAL EXAM:  HEENT: NC atraumatic  Neck: supple  Respiratory: no accessory muscle use, breathing comfortably  Cardiovascular: distant  Gastrointestinal: normal appearing, nondistended  Extremities: no clubbing, no cyanosis,        LABS:                          10.5   9.99  )-----------( 334      ( 24 Jun 2024 12:40 )             33.8       WBC  9.99 06-24 @ 12:40  6.63 06-22 @ 14:08  7.83 06-20 @ 12:20  12.09 06-19 @ 06:15  7.12 06-18 @ 05:50      06-24    136  |  102  |  9   ----------------------------<  195<H>  3.8   |  25  |  1.20    Ca    8.3<L>      24 Jun 2024 12:40    TPro  6.1  /  Alb  2.0<L>  /  TBili  0.3  /  DBili  x   /  AST  13<L>  /  ALT  34  /  AlkPhos  92  06-24      Creatinine: 1.20 mg/dL (06-24-24 @ 12:40)  Creatinine: 1.10 mg/dL (06-22-24 @ 14:08)  Creatinine: 1.10 mg/dL (06-20-24 @ 12:20)  Creatinine: 1.00 mg/dL (06-19-24 @ 06:15)  Creatinine: 1.20 mg/dL (06-18-24 @ 05:50)  Creatinine: 1.20 mg/dL (06-17-24 @ 19:10)        Urinalysis Basic - ( 24 Jun 2024 12:40 )    Color: x / Appearance: x / SG: x / pH: x  Gluc: 195 mg/dL / Ketone: x  / Bili: x / Urobili: x   Blood: x / Protein: x / Nitrite: x   Leuk Esterase: x / RBC: x / WBC x   Sq Epi: x / Non Sq Epi: x / Bacteria: x            INFLAMMATORY MARKERS      MICROBIOLOGY:              RADIOLOGY & ADDITIONAL STUDIES:

## 2024-06-24 NOTE — PROGRESS NOTE ADULT - SUBJECTIVE AND OBJECTIVE BOX
Date of Service 06-24-24 @ 15:21    Patient is a 54y old  Male who presents with a chief complaint of Acute respiratory failure with hypoxia (24 Jun 2024 14:54)      INTERVAL /OVERNIGHT EVENTS: itching improved    MEDICATIONS  (STANDING):  aspirin enteric coated 81 milliGRAM(s) Oral daily  atorvastatin 40 milliGRAM(s) Oral at bedtime  benzocaine 20% Spray 1 Spray(s) Topical every 6 hours  calamine/zinc oxide Lotion 1 Application(s) Topical two times a day  chlorhexidine 2% Cloths 1 Application(s) Topical daily  clobetasol 0.05% Cream 1 Application(s) Topical two times a day  dextrose 10% Bolus 125 milliLiter(s) IV Bolus once  dextrose 5%. 1000 milliLiter(s) (50 mL/Hr) IV Continuous <Continuous>  dextrose 5%. 1000 milliLiter(s) (100 mL/Hr) IV Continuous <Continuous>  dextrose 50% Injectable 25 Gram(s) IV Push once  dextrose 50% Injectable 12.5 Gram(s) IV Push once  diltiazem    Tablet 120 milliGRAM(s) Oral every 8 hours  enoxaparin Injectable 100 milliGRAM(s) SubCutaneous every 12 hours  famotidine    Tablet 20 milliGRAM(s) Oral two times a day  furosemide    Tablet 20 milliGRAM(s) Oral daily  gabapentin 100 milliGRAM(s) Oral every 12 hours  glucagon  Injectable 1 milliGRAM(s) IntraMuscular once  hydrOXYzine hydrochloride 25 milliGRAM(s) Oral two times a day  insulin lispro (ADMELOG) corrective regimen sliding scale   SubCutaneous three times a day before meals  metoprolol tartrate 50 milliGRAM(s) Oral every 6 hours  potassium chloride    Tablet ER 10 milliEquivalent(s) Oral daily  tiotropium 2.5 MICROgram(s)/olodaterol 2.5 MICROgram(s) (STIOLTO) Inhaler 2 Puff(s) Inhalation daily  vancomycin  IVPB 750 milliGRAM(s) IV Intermittent every 12 hours    MEDICATIONS  (PRN):  acetaminophen     Tablet .. 650 milliGRAM(s) Oral every 6 hours PRN Mild Pain (1 - 3)  diphenhydrAMINE 25 milliGRAM(s) Oral every 4 hours PRN Rash and/or Itching  metoprolol tartrate Injectable 5 milliGRAM(s) IV Push every 6 hours PRN HR >140  oxyCODONE    IR 10 milliGRAM(s) Oral every 6 hours PRN Severe Pain (7 - 10)  oxyCODONE    IR 5 milliGRAM(s) Oral every 6 hours PRN Moderate Pain (4 - 6)  sodium chloride 0.9% lock flush 10 milliLiter(s) IV Push every 1 hour PRN Pre/post blood products, medications, blood draw, and to maintain line patency      Allergies    ertapenem (Blisters; Rash)  fish (Hives)    Intolerances        REVIEW OF SYSTEMS:  CONSTITUTIONAL: No fever, weight loss, or fatigue  EYES: No eye pain, visual disturbances, or discharge  ENMT:  No difficulty hearing, tinnitus, vertigo; No sinus or throat pain  NECK: No pain or stiffness  RESPIRATORY: No cough, wheezing, chills or hemoptysis; No shortness of breath  CARDIOVASCULAR: No chest pain, palpitations, dizziness, or leg swelling  GASTROINTESTINAL: No abdominal or epigastric pain. No nausea, vomiting, or hematemesis; No diarrhea or constipation. No melena or hematochezia.  GENITOURINARY: No dysuria, frequency, hematuria, or incontinence  NEUROLOGICAL: No headaches, memory loss, loss of strength, numbness, or tremors  SKIN: +, rashes  LYMPH NODES: No enlarged glands  ENDOCRINE: No heat or cold intolerance; No hair loss; No polydipsia or polyuria  MUSCULOSKELETAL: No joint pain or swelling; No muscle, back, or extremity pain  PSYCHIATRIC: No depression, anxiety, mood swings, or difficulty sleeping  HEME/LYMPH: No easy bruising, or bleeding gums  ALLERGY AND IMMUNOLOGIC: No hives or eczema    Vital Signs Last 24 Hrs  T(C): 36.9 (24 Jun 2024 11:55), Max: 37.1 (24 Jun 2024 04:50)  T(F): 98.5 (24 Jun 2024 11:55), Max: 98.7 (24 Jun 2024 04:50)  HR: 73 (24 Jun 2024 11:55) (73 - 73)  BP: 98/76 (24 Jun 2024 11:55) (92/60 - 112/73)  BP(mean): --  RR: 17 (24 Jun 2024 11:55) (17 - 17)  SpO2: 97% (24 Jun 2024 11:55) (91% - 97%)    Parameters below as of 24 Jun 2024 11:55  Patient On (Oxygen Delivery Method): room air        PHYSICAL EXAM:  GENERAL: NAD, well-groomed, well-developed  HEAD:  Atraumatic, Normocephalic  EYES: EOMI, PERRLA, conjunctiva and sclera clear  ENMT: No tonsillar erythema, exudates, or enlargement; Moist mucous membranes, Good dentition, No lesions  NECK: Supple, No JVD, Normal thyroid  NERVOUS SYSTEM:  Alert & Oriented X3, Good concentration; Motor Strength 5/5 B/L upper and lower extremities; DTRs 2+ intact and symmetric  CHEST/LUNG: Clear to auscultation bilaterally; No rales, rhonchi, wheezing, or rubs  HEART: Regular rate and rhythm; No murmurs, rubs, or gallops  ABDOMEN: Soft, Nontender, Nondistended; Bowel sounds present  EXTREMITIES:  2+ Peripheral Pulses, No clubbing, cyanosis, or edema  LYMPH: No lymphadenopathy noted  SKIN: + rashes    LABS:                        10.5   9.99  )-----------( 334      ( 24 Jun 2024 12:40 )             33.8     24 Jun 2024 12:40    136    |  102    |  9      ----------------------------<  195    3.8     |  25     |  1.20     Ca    8.3        24 Jun 2024 12:40    TPro  6.1    /  Alb  2.0    /  TBili  0.3    /  DBili  x      /  AST  13     /  ALT  34     /  AlkPhos  92     24 Jun 2024 12:40      Urinalysis Basic - ( 24 Jun 2024 12:40 )    Color: x / Appearance: x / SG: x / pH: x  Gluc: 195 mg/dL / Ketone: x  / Bili: x / Urobili: x   Blood: x / Protein: x / Nitrite: x   Leuk Esterase: x / RBC: x / WBC x   Sq Epi: x / Non Sq Epi: x / Bacteria: x      CAPILLARY BLOOD GLUCOSE      POCT Blood Glucose.: 194 mg/dL (24 Jun 2024 12:15)  POCT Blood Glucose.: 174 mg/dL (24 Jun 2024 08:23)  POCT Blood Glucose.: 213 mg/dL (23 Jun 2024 17:11)      RADIOLOGY & ADDITIONAL TESTS:    Notes Reviewed:  [x ] YES  [ ] NO    Care Discussed with Consultants/Other Providers [ x] YES  [ ] NO

## 2024-06-24 NOTE — PHARMACOTHERAPY INTERVENTION NOTE - COMMENTS
Vancomycin Dosing Per Pharmacy:  Patient is a 52 yo male being treated for R heel wound/osteomyelitis, currently ordered for IV vancomycin. Current 24H AUC/BAYLEE expected to be supra-therapeutic at 620. Dose reduced to 750mg Q12 for an anticipated 24H AUC of 536.     1. Indication for the vancomycin: R heel wound/osteomyelitis  2. Requesting Provider: Dr. Whitten  3. Current plan and dosing regimen: 750mg Q12   4. Day of therapy: 21  5. Cultures: Tissue Cx 5/26/24: E faecalis + MRSA. Tissue Cx 6/4/24: ESBL E coli + E faecalis. R heel surgical swab 6/14/24: MRSA + citrobacter freundii + pseudomonas stutzeri.  6. Target trough or AUC/BAYLEE: 400-600  7. Day and time level is due: 6/26 08:00  8. Previous levels: 6/4 (19:31): 22, 6/5 (05:47): 14, 6/6 (20:20): 14, 6/9 (04:58): 14.4, 6/11 (04:30): 15.7, 6/13 (05:54): 17.3, 6/14 (01:25): 15.2, 6/14 (04:30): 16.1, 6/15 (5:40): 17.5, 6/16 (5:38): 15.3, 6/17 (05:52): 13.2, 6/18 (05:50): 11.8, 6/19 (06:15): 21.3, 6/20 (08:52): 21.6, 6/21 (09:30): 37.5, 06/22 (0945): 11.1, 06/23 (09:30): 15.9, 06/24 (08:00): 19.6  9. Expected 24-hour AUC: 536

## 2024-06-24 NOTE — CHART NOTE - NSCHARTNOTEFT_GEN_A_CORE
Assessment: Pt seen for nutrition follow-up. Chart reviewed, hospital course noted.    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 Extubated 6/16. Transferred out of ICU to floors 6/19."    Visited pt at bedside this am. Pt reports fair appetite/intake. Dislikes food consistency/texture. Swallow evaluation completed 6/18- SLP recommended minced/moist diet with thin liquids at that time. Pt requesting regular solid foods. Recommend SLP reassessment. PO intakes % per nursing documentation. Denies N/V. +BM 6/18. Recommend bowel regimen. Pt continues on minced/moist, consistent carbohydrate, DASH/TLC diet. Agreeable to receive ensure max protein shake for additional kcal/protein. RD remains available and will continue to follow-up.     Factors impacting intake: [ ] none [ ] nausea  [ ] vomiting [ ] diarrhea [ ] constipation  [ ]chewing problems [ ] swallowing issues  [ ] other:     Diet Prescription: Diet, Minced and Moist:   Consistent Carbohydrate {No Snacks}  DASH/TLC {Sodium & Cholesterol Restricted} (06-18-24 @ 11:55) [Active]    Intake: fair-good    Current Weight: 6/14 223.3#, 6/24 222.6# (no edema noted)  % Weight Change    Pertinent Medications: MEDICATIONS  (STANDING):  aspirin enteric coated 81 milliGRAM(s) Oral daily  atorvastatin 40 milliGRAM(s) Oral at bedtime  benzocaine 20% Spray 1 Spray(s) Topical every 6 hours  calamine/zinc oxide Lotion 1 Application(s) Topical two times a day  chlorhexidine 2% Cloths 1 Application(s) Topical daily  clobetasol 0.05% Cream 1 Application(s) Topical two times a day  dextrose 10% Bolus 125 milliLiter(s) IV Bolus once  dextrose 5%. 1000 milliLiter(s) (50 mL/Hr) IV Continuous <Continuous>  dextrose 5%. 1000 milliLiter(s) (100 mL/Hr) IV Continuous <Continuous>  dextrose 50% Injectable 25 Gram(s) IV Push once  dextrose 50% Injectable 12.5 Gram(s) IV Push once  diltiazem    Tablet 120 milliGRAM(s) Oral every 8 hours  enoxaparin Injectable 100 milliGRAM(s) SubCutaneous every 12 hours  famotidine    Tablet 20 milliGRAM(s) Oral two times a day  gabapentin 100 milliGRAM(s) Oral every 12 hours  glucagon  Injectable 1 milliGRAM(s) IntraMuscular once  hydrOXYzine hydrochloride 25 milliGRAM(s) Oral two times a day  insulin lispro (ADMELOG) corrective regimen sliding scale   SubCutaneous three times a day before meals  metoprolol tartrate 50 milliGRAM(s) Oral every 6 hours  tiotropium 2.5 MICROgram(s)/olodaterol 2.5 MICROgram(s) (STIOLTO) Inhaler 2 Puff(s) Inhalation daily  vancomycin  IVPB 1000 milliGRAM(s) IV Intermittent every 12 hours    MEDICATIONS  (PRN):  acetaminophen     Tablet .. 650 milliGRAM(s) Oral every 6 hours PRN Mild Pain (1 - 3)  diphenhydrAMINE 25 milliGRAM(s) Oral every 4 hours PRN Rash and/or Itching  metoprolol tartrate Injectable 5 milliGRAM(s) IV Push every 6 hours PRN HR >140  oxyCODONE    IR 5 milliGRAM(s) Oral every 6 hours PRN Moderate Pain (4 - 6)  oxyCODONE    IR 10 milliGRAM(s) Oral every 6 hours PRN Severe Pain (7 - 10)  sodium chloride 0.9% lock flush 10 milliLiter(s) IV Push every 1 hour PRN Pre/post blood products, medications, blood draw, and to maintain line patency    Pertinent Labs: 06-22 Na136 mmol/L Glu 211 mg/dL<H> K+ 3.5 mmol/L Cr  1.10 mg/dL BUN 7 mg/dL 06-20 Phos 2.5 mg/dL 06-22 Alb 1.9 g/dL<L>    CAPILLARY BLOOD GLUCOSE  POCT Blood Glucose.: 174 mg/dL (24 Jun 2024 08:23)  POCT Blood Glucose.: 213 mg/dL (23 Jun 2024 17:11)  POCT Blood Glucose.: 192 mg/dL (23 Jun 2024 12:03)    Skin: R heel, L foot wound, stage II L buttocks, stage II sacrum    Estimated Needs:   [X] no change since previous assessment: based on #/88.4kg  25-30kcal/kg (2210-2652kcal)  1.2-1.4g pro/kg (106-124gm protein)  [ ] recalculated:     Previous Nutrition Diagnosis:   [ ] Inadequate Energy Intake [ ]Inadequate Oral Intake [ ] Excessive Energy Intake   [ ] Underweight [X] Increased Nutrient Needs [ ] Overweight/Obesity   [ ] Altered GI Function [ ] Unintended Weight Loss [ ] Food & Nutrition Related Knowledge Deficit [ ] Malnutrition     Nutrition Diagnosis is [X] ongoing  [ ] resolved [ ] not applicable     New Nutrition Diagnosis: [X] not applicable     Interventions:   Recommend  [ ] Change Diet To:  [X] Nutrition Supplement: recommend ensure max protein shake BID (150kcal, 30gm protein per 11 fl oz serving)  [ ] Nutrition Support  [X] Other: Continue diet as ordered; recommend SLP reassessment for possible diet texture upgrade   Recommend MVI/minerals daily, Vit C 500mg BID    Monitoring and Evaluation:   [X] PO intake [ x ] Tolerance to diet prescription [ x ] weights [ x ] labs[ x ] follow up per protocol  [X] other: s/s GI distress, bowel function, skin integrity/ edema

## 2024-06-24 NOTE — PROGRESS NOTE ADULT - ASSESSMENT
Claude Villareal is a 52 YO M with past medical history of  AF on Eliquis, indwelling Aguilera, CAD s/p stents, CVA, DMT 2, Hypertension, osteomyelitis s/p debridement, PE, transferred from [facility] for difficulty in breathing.  His recent admission was for osteomyelitis and discharged on IV Vancomycin. On ED arrival he is unresponsive, apneic, no palpable pulse or blood pressure, EKG rhythm HR < 20/min W/O P waves. Patient intubated, Code ACLS activated, IV epinephrine, IV atropine administered, pulse became palpable, still Hypotensive, IV levophed administered. Admitted to ICU consulted,  Patient woke up and agitated try to pull out ETT,  IV propofol started,

## 2024-06-25 LAB
GLUCOSE BLDC GLUCOMTR-MCNC: 169 MG/DL — HIGH (ref 70–99)
GLUCOSE BLDC GLUCOMTR-MCNC: 188 MG/DL — HIGH (ref 70–99)
GLUCOSE BLDC GLUCOMTR-MCNC: 248 MG/DL — HIGH (ref 70–99)
GLUCOSE BLDC GLUCOMTR-MCNC: 264 MG/DL — HIGH (ref 70–99)

## 2024-06-25 PROCEDURE — 99232 SBSQ HOSP IP/OBS MODERATE 35: CPT

## 2024-06-25 RX ORDER — NALOXEGOL OXALATE 25 MG/1
25 TABLET, FILM COATED ORAL DAILY
Refills: 0 | Status: DISCONTINUED | OUTPATIENT
Start: 2024-06-25 | End: 2024-07-05

## 2024-06-25 RX ORDER — FERROUS SULFATE 325(65) MG
325 TABLET ORAL
Refills: 0 | Status: DISCONTINUED | OUTPATIENT
Start: 2024-06-25 | End: 2024-06-26

## 2024-06-25 RX ORDER — POLYETHYLENE GLYCOL 3350 1 G/G
17 POWDER ORAL DAILY
Refills: 0 | Status: DISCONTINUED | OUTPATIENT
Start: 2024-06-25 | End: 2024-07-05

## 2024-06-25 RX ORDER — OXYBUTYNIN CHLORIDE 5 MG
10 TABLET ORAL DAILY
Refills: 0 | Status: DISCONTINUED | OUTPATIENT
Start: 2024-06-25 | End: 2024-06-25

## 2024-06-25 RX ORDER — SODIUM CHLORIDE 0.65 %
1 AEROSOL, SPRAY (ML) NASAL THREE TIMES A DAY
Refills: 0 | Status: DISCONTINUED | OUTPATIENT
Start: 2024-06-25 | End: 2024-07-05

## 2024-06-25 RX ORDER — SENNOSIDES 8.6 MG
2 TABLET ORAL AT BEDTIME
Refills: 0 | Status: DISCONTINUED | OUTPATIENT
Start: 2024-06-25 | End: 2024-07-05

## 2024-06-25 RX ORDER — SUCRALFATE 1 G
1 TABLET ORAL
Refills: 0 | Status: DISCONTINUED | OUTPATIENT
Start: 2024-06-25 | End: 2024-07-05

## 2024-06-25 RX ORDER — PREDNISONE 10 MG/1
10 TABLET ORAL DAILY
Refills: 0 | Status: DISCONTINUED | OUTPATIENT
Start: 2024-06-25 | End: 2024-06-27

## 2024-06-25 RX ORDER — METFORMIN HYDROCHLORIDE 850 MG/1
1000 TABLET, FILM COATED ORAL
Refills: 0 | Status: DISCONTINUED | OUTPATIENT
Start: 2024-06-25 | End: 2024-07-05

## 2024-06-25 RX ORDER — PANTOPRAZOLE SODIUM 40 MG/10ML
40 INJECTION, POWDER, FOR SOLUTION INTRAVENOUS
Refills: 0 | Status: DISCONTINUED | OUTPATIENT
Start: 2024-06-25 | End: 2024-06-26

## 2024-06-25 RX ORDER — OXYBUTYNIN CHLORIDE 5 MG
10 TABLET ORAL DAILY
Refills: 0 | Status: DISCONTINUED | OUTPATIENT
Start: 2024-06-25 | End: 2024-07-05

## 2024-06-25 RX ORDER — APIXABAN 5 MG/1
5 TABLET, FILM COATED ORAL
Refills: 0 | Status: DISCONTINUED | OUTPATIENT
Start: 2024-06-25 | End: 2024-07-05

## 2024-06-25 RX ORDER — INSULIN LISPRO 100 [IU]/ML
INJECTION, SOLUTION SUBCUTANEOUS AT BEDTIME
Refills: 0 | Status: DISCONTINUED | OUTPATIENT
Start: 2024-06-25 | End: 2024-07-05

## 2024-06-25 RX ORDER — METHYLPREDNISOLONE ACETATE 20 MG/ML
40 VIAL (ML) INJECTION ONCE
Refills: 0 | Status: COMPLETED | OUTPATIENT
Start: 2024-06-25 | End: 2024-06-25

## 2024-06-25 RX ADMIN — Medication 325 MILLIGRAM(S): at 17:36

## 2024-06-25 RX ADMIN — Medication 1000 UNIT(S): at 12:04

## 2024-06-25 RX ADMIN — ATORVASTATIN CALCIUM 40 MILLIGRAM(S): 20 TABLET, FILM COATED ORAL at 22:14

## 2024-06-25 RX ADMIN — VANCOMYCIN HYDROCHLORIDE 250 MILLIGRAM(S): KIT at 20:26

## 2024-06-25 RX ADMIN — INSULIN LISPRO 1: 100 INJECTION, SOLUTION SUBCUTANEOUS at 22:44

## 2024-06-25 RX ADMIN — Medication 50 MILLIGRAM(S): at 05:25

## 2024-06-25 RX ADMIN — FUROSEMIDE 20 MILLIGRAM(S): 10 INJECTION, SOLUTION INTRAMUSCULAR; INTRAVENOUS at 05:25

## 2024-06-25 RX ADMIN — DILTIAZEM HYDROCHLORIDE 120 MILLIGRAM(S): 240 CAPSULE, EXTENDED RELEASE ORAL at 05:26

## 2024-06-25 RX ADMIN — TIOTROPIUM BROMIDE AND OLODATEROL 2 PUFF(S): 3.124; 2.736 SPRAY, METERED RESPIRATORY (INHALATION) at 06:28

## 2024-06-25 RX ADMIN — Medication 1 SPRAY(S): at 05:25

## 2024-06-25 RX ADMIN — Medication 40 MILLIGRAM(S): at 17:32

## 2024-06-25 RX ADMIN — HYDROXYZINE PAMOATE 25 MILLIGRAM(S): 50 CAPSULE ORAL at 17:35

## 2024-06-25 RX ADMIN — Medication 1 APPLICATION(S): at 17:38

## 2024-06-25 RX ADMIN — Medication 50 MILLIGRAM(S): at 17:35

## 2024-06-25 RX ADMIN — Medication 1 TABLET(S): at 12:04

## 2024-06-25 RX ADMIN — Medication 1 APPLICATION(S): at 12:04

## 2024-06-25 RX ADMIN — INSULIN LISPRO 2: 100 INJECTION, SOLUTION SUBCUTANEOUS at 17:33

## 2024-06-25 RX ADMIN — OXYCODONE HYDROCHLORIDE 10 MILLIGRAM(S): 100 SOLUTION ORAL at 22:14

## 2024-06-25 RX ADMIN — NALOXEGOL OXALATE 25 MILLIGRAM(S): 25 TABLET, FILM COATED ORAL at 13:02

## 2024-06-25 RX ADMIN — Medication 100 MILLIGRAM(S): at 17:35

## 2024-06-25 RX ADMIN — Medication 2 TABLET(S): at 22:13

## 2024-06-25 RX ADMIN — Medication 20 MILLIGRAM(S): at 05:25

## 2024-06-25 RX ADMIN — DILTIAZEM HYDROCHLORIDE 120 MILLIGRAM(S): 240 CAPSULE, EXTENDED RELEASE ORAL at 13:02

## 2024-06-25 RX ADMIN — APIXABAN 5 MILLIGRAM(S): 5 TABLET, FILM COATED ORAL at 17:36

## 2024-06-25 RX ADMIN — OXYCODONE HYDROCHLORIDE 10 MILLIGRAM(S): 100 SOLUTION ORAL at 12:08

## 2024-06-25 RX ADMIN — OXYCODONE HYDROCHLORIDE 10 MILLIGRAM(S): 100 SOLUTION ORAL at 23:02

## 2024-06-25 RX ADMIN — Medication 10 MILLIGRAM(S): at 17:31

## 2024-06-25 RX ADMIN — Medication 1 GRAM(S): at 17:35

## 2024-06-25 RX ADMIN — VANCOMYCIN HYDROCHLORIDE 250 MILLIGRAM(S): KIT at 08:45

## 2024-06-25 RX ADMIN — ASPIRIN 81 MILLIGRAM(S): 325 TABLET, FILM COATED ORAL at 12:04

## 2024-06-25 RX ADMIN — Medication 25 MILLIGRAM(S): at 22:14

## 2024-06-25 RX ADMIN — HYDROXYZINE PAMOATE 25 MILLIGRAM(S): 50 CAPSULE ORAL at 05:26

## 2024-06-25 RX ADMIN — Medication 500 MILLIGRAM(S): at 12:04

## 2024-06-25 RX ADMIN — ENOXAPARIN SODIUM 100 MILLIGRAM(S): 100 INJECTION SUBCUTANEOUS at 05:25

## 2024-06-25 RX ADMIN — INSULIN LISPRO 2: 100 INJECTION, SOLUTION SUBCUTANEOUS at 08:45

## 2024-06-25 RX ADMIN — Medication 5 MILLIGRAM(S): at 22:14

## 2024-06-25 RX ADMIN — INSULIN LISPRO 4: 100 INJECTION, SOLUTION SUBCUTANEOUS at 12:34

## 2024-06-25 RX ADMIN — DILTIAZEM HYDROCHLORIDE 120 MILLIGRAM(S): 240 CAPSULE, EXTENDED RELEASE ORAL at 22:13

## 2024-06-25 RX ADMIN — Medication 25 MILLIGRAM(S): at 12:07

## 2024-06-25 RX ADMIN — CLOBETASOL PROPIONATE 1 APPLICATION(S): 0.5 CREAM TOPICAL at 18:27

## 2024-06-25 RX ADMIN — Medication 1 APPLICATION(S): at 05:34

## 2024-06-25 RX ADMIN — Medication 20 MILLIGRAM(S): at 17:36

## 2024-06-25 RX ADMIN — POTASSIUM CHLORIDE 10 MILLIEQUIVALENT(S): 600 TABLET, FILM COATED, EXTENDED RELEASE ORAL at 12:03

## 2024-06-25 RX ADMIN — METFORMIN HYDROCHLORIDE 1000 MILLIGRAM(S): 850 TABLET, FILM COATED ORAL at 17:36

## 2024-06-25 RX ADMIN — Medication 100 MILLIGRAM(S): at 05:29

## 2024-06-25 RX ADMIN — Medication 50 MILLIGRAM(S): at 12:03

## 2024-06-25 NOTE — SWALLOW BEDSIDE ASSESSMENT ADULT - SWALLOW EVAL: DIAGNOSIS
1. Functional oral phase for easy to chew solids and thin liquids marked by adequate acceptance and containment, adequate mastication of solids, adequate oral transit and adequate oral clearance. 2. Mild-moderate oral dysphagia for regular solids marked by inadequate mastication in the setting of missing dentition, delayed oral transit and adequate oral clearance. 3. Functional pharyngeal phase for aforementioned consistencies marked by hyolaryngeal excursion present upon palpation and no overt s/s of penetration/aspiration evidenced.
1. Functional oral phase for puree, minced and moist solids, mildly-thick and thin liquids marked by adequate acceptance and containmnet, adequate bolus manipulation, adequate oral transit and adequate oral clearance. 2. Mild-moderate oral dysphagia for soft bite sized and regular solids marked by inadequate mastication, adequate oral transit and adequate oral clearance. 3. Functional pharyngeal phase for aforementioned consistencies marked by hyolaryngeal excursion present upon palpation and no overt s/s of penetration/aspiration evidenced.

## 2024-06-25 NOTE — SWALLOW BEDSIDE ASSESSMENT ADULT - ASR SWALLOW RECOMMEND DIAG
Objective testing is NOT indicated given functional swallow for recommended diet and no recent chest imaging
Objective testing is NOT indicated given functional swallow for recommended diet and no indication of pneumonia per chest imaging

## 2024-06-25 NOTE — PROGRESS NOTE ADULT - SUBJECTIVE AND OBJECTIVE BOX
[facility] -- Bolivar Carbajal Wachsman, Reynaldo Sweeney Savella, Goodger, Cohen  Office # [phone]    Follow Up:   hypoxia     Subjective/Observations: No events overnight resting comfortably in bed.  No complaints of chest pain, dyspnea, or palpitations reported. No signs of orthopnea or PND.      REVIEW OF SYSTEMS: All other review of systems is negative unless indicated above  PAST MEDICAL & SURGICAL HISTORY:  Diabetes      Diabetes mellitus with no complication      Afib      Hypertension      BPH (benign prostatic hyperplasia)      Perforated gastric ulcer  s/p emergent ex-lap omentopexy and plication 6/2019      Pulmonary embolism      History of non-ST elevation myocardial infarction (NSTEMI)      Osteomyelitis  s/p debridement      CAD S/P percutaneous coronary angioplasty      Cerebrovascular accident      H/O abdominal surgery      Perforated gastric ulcer      Traumatic amputation of left foot, initial encounter        MEDICATIONS  (STANDING):  aspirin enteric coated 81 milliGRAM(s) Oral daily  atorvastatin 40 milliGRAM(s) Oral at bedtime  benzocaine 20% Spray 1 Spray(s) Topical every 6 hours  calamine/zinc oxide Lotion 1 Application(s) Topical two times a day  chlorhexidine 2% Cloths 1 Application(s) Topical daily  clobetasol 0.05% Cream 1 Application(s) Topical two times a day  dextrose 10% Bolus 125 milliLiter(s) IV Bolus once  dextrose 5%. 1000 milliLiter(s) (50 mL/Hr) IV Continuous <Continuous>  dextrose 5%. 1000 milliLiter(s) (100 mL/Hr) IV Continuous <Continuous>  dextrose 50% Injectable 25 Gram(s) IV Push once  dextrose 50% Injectable 12.5 Gram(s) IV Push once  diltiazem    Tablet 120 milliGRAM(s) Oral every 8 hours  enoxaparin Injectable 100 milliGRAM(s) SubCutaneous every 12 hours  famotidine    Tablet 20 milliGRAM(s) Oral two times a day  furosemide    Tablet 20 milliGRAM(s) Oral daily  gabapentin 100 milliGRAM(s) Oral every 12 hours  glucagon  Injectable 1 milliGRAM(s) IntraMuscular once  hydrOXYzine hydrochloride 25 milliGRAM(s) Oral two times a day  insulin lispro (ADMELOG) corrective regimen sliding scale   SubCutaneous three times a day before meals  metoprolol tartrate 50 milliGRAM(s) Oral every 6 hours  potassium chloride    Tablet ER 10 milliEquivalent(s) Oral daily  tiotropium 2.5 MICROgram(s)/olodaterol 2.5 MICROgram(s) (STIOLTO) Inhaler 2 Puff(s) Inhalation daily  vancomycin  IVPB 750 milliGRAM(s) IV Intermittent every 12 hours    MEDICATIONS  (PRN):  acetaminophen     Tablet .. 650 milliGRAM(s) Oral every 6 hours PRN Mild Pain (1 - 3)  diphenhydrAMINE 25 milliGRAM(s) Oral every 4 hours PRN Rash and/or Itching  metoprolol tartrate Injectable 5 milliGRAM(s) IV Push every 6 hours PRN HR >140  oxyCODONE    IR 5 milliGRAM(s) Oral every 6 hours PRN Moderate Pain (4 - 6)  oxyCODONE    IR 10 milliGRAM(s) Oral every 6 hours PRN Severe Pain (7 - 10)  sodium chloride 0.9% lock flush 10 milliLiter(s) IV Push every 1 hour PRN Pre/post blood products, medications, blood draw, and to maintain line patency    Allergies    ertapenem (Blisters; Rash)  fish (Hives)    Intolerances      Vital Signs Last 24 Hrs  T(C): 37.2 (25 Jun 2024 05:00), Max: 37.2 (25 Jun 2024 05:00)  T(F): 98.9 (25 Jun 2024 05:00), Max: 98.9 (25 Jun 2024 05:00)  HR: 83 (25 Jun 2024 05:00) (73 - 112)  BP: 118/74 (25 Jun 2024 05:00) (98/76 - 118/74)  BP(mean): --  RR: 17 (25 Jun 2024 05:00) (17 - 18)  SpO2: 90% (25 Jun 2024 05:00) (90% - 97%)    Parameters below as of 25 Jun 2024 05:00  Patient On (Oxygen Delivery Method): room air      I&O's Summary    24 Jun 2024 07:01  -  25 Jun 2024 07:00  --------------------------------------------------------  IN: 550 mL / OUT: 4300 mL / NET: -3750 mL        TELE: off  PHYSICAL EXAM:  Constitutional: NAD, awake and alert,   HEENT: Moist Mucous Membranes, Anicteric  Pulmonary: Non-labored, breath sounds are clear bilaterally, No wheezing, rales or rhonchi  Cardiovascular: IRRegular, S1 and S2, No murmurs, No rubs, gallops or clicks  Gastrointestinal:  soft, nontender, nondistended   Lymph: + peripheral edema.  R leg dsg intact  Skin: + rashes       LABS: All Labs Reviewed:                        10.5   9.99  )-----------( 334      ( 24 Jun 2024 12:40 )             33.8                         10.4   6.63  )-----------( 192      ( 22 Jun 2024 14:08 )             32.2     24 Jun 2024 12:40    136    |  102    |  9      ----------------------------<  195    3.8     |  25     |  1.20   22 Jun 2024 14:08    136    |  108    |  7      ----------------------------<  211    3.5     |  23     |  1.10     Ca    8.3        24 Jun 2024 12:40  Ca    8.2        22 Jun 2024 14:08    TPro  6.1    /  Alb  2.0    /  TBili  0.3    /  DBili  x      /  AST  13     /  ALT  34     /  AlkPhos  92     24 Jun 2024 12:40  TPro  5.5    /  Alb  1.9    /  TBili  0.4    /  DBili  x      /  AST  18     /  ALT  51     /  AlkPhos  90     22 Jun 2024 14:08          12 Lead ECG:   Ventricular Rate 126 BPM    QRS Duration 76 ms    Q-T Interval 302 ms    QTC Calculation(Bazett) 437 ms    R Axis -12 degrees    T Axis 103 degrees    Diagnosis Line Atrial fibrillation with rapid ventricular response  Low voltage QRS  Nonspecific ST and T wave abnormality  Abnormal ECG  When compared with ECG of 14-JUN-2024 11:03,  Questionable change in QRS axis  Nonspecific T wave abnormality now evident in Inferior leads  Nonspecific T wave abnormality, worse in Anterolateral leads  Confirmed by Jovanni Soto MD ([id number]) on 6/20/2024 12:52:09 PM (06-20-24 @ 11:37)       EXAM:  ECHO TTE WO CON COMP W DOPPLR         PROCEDURE DATE:  12/24/2019        INTERPRETATION:  INDICATION: Coronary artery disease    Blood Pressure 117/71    Height 187     Weight 93       BSA 2.2    Dimensions:    LA 4.2       Normal Values: 2.0 - 4.0 cm    Ao 4.0        Normal Values: 2.0 - 3.8 cm  SEPTUM 1.6       Normal Values: 0.6 - 1.2 cm  PWT 1.6       Normal Values: 0.6 - 1.1 cm  LVIDd 5.8         Normal Values: 3.0 - 5.6 cm  LVIDs 3.2         Normal Values: 1.8 - 4.0 cm    Derived Variables:  LVMI g/m2  RWT  Fractional Short  Ejection Fraction    Doppler Peak v. AoV= (m/sec)    OBSERVATIONS:    Mitral Valve: normal, mild MR.  Aortic Valve/Aorta: normal trileaflet aortic valve.  Tricuspid Valve: normal with trace TR.  Pulmonic Valve: normal  Left Atrium: Enlarged  Right Atrium: normal  Left Ventricle: Severe concentric LVH with normal systolic function, estimated LVEF of 65%.  Right Ventricle: normal size and systolic function.  Pericardium/Pleura: no significant pleural effusion, no significant pericardial effusion.  Pulmonary/RV Pressure: Inadequate TR jet to estimate PA systolic pressure  LV Diastolic Function: Grade 2diastolic dysfunction.    Severe concentric LVH, and mild LV enlargement, with normal systolic function, estimated LVEF of 65%. Normal right ventricular size and systolic function. Grade 2diastolic dysfunction. Left atrial enlargement The aortic root is normal in size. The mitral valve is structurally normal, mild MR. The aortic valve is trileaflet without stenosis or insufficiency. Trace physiologic TR. Inadequate TR jet to estimate PA systolic pressure No significant pericardial effusion.                  JOVANNI SOTO M.D., ATTENDING CARDIOLOGIST  This document has been electronically signed. Dec 25 2019 11:45AM

## 2024-06-25 NOTE — PROGRESS NOTE ADULT - ASSESSMENT
Claude Villareal is a 52 YO M with past medical history of  AF on Eliquis, indwelling Aguilera, CAD s/p stents, CVA, DMT 2, Hypertension, osteomyelitis s/p debridement, PE, transferred from [facility] for difficulty in breathing.  His recent admission was for osteomyelitis and discharged on IV Vancomycin. On ED arrival he is unresponsive, apneic, no palpable pulse or blood pressure, EKG rhythm HR < 20/min W/O P waves. Patient intubated, Code ACLS activated, IV epinephrine, IV atropine administered, pulse became palpable, still Hypotensive, IV levophed administered. Admitted to ICU consulted,  Patient woke up and agitated try to pull out ETT,  IV propofol started,

## 2024-06-25 NOTE — PROGRESS NOTE ADULT - PROBLEM SELECTOR PLAN 1
Patient examined and evluated at this time. Continue with local wound care and offloading.  Nursing dressing change order placed to have dressings changed every other day. Right heel needs to be offloaded with at least two pillows to be completely offloaded.    Wound Care Instructions:  -Keep dressing clean, dry, and intact to the b/l lower extremity  -Apply medihoney, calcium alginate, gauze, abd, paul, change every other day   -keep right heel completely offloaded  -Monitor for any signs of infection including redness, swelling in the leg above the bandage,   -WB as tolerated in surgical shoe left  -NWB right lower extremity, also offload the heel with pillow under the calf to alleviate heel pressure   - If notice nausea/vomiting/fever/chills/chest pain/shortness of breath, if any are present patient must report to the emergency department immediately  -Patient is to follow up with Dr. Hale/Dr. Todd/ Dr. Jewell  within 5 days after discharge at [facility] [place]. Please call to make an appointment [phone].

## 2024-06-25 NOTE — PROGRESS NOTE ADULT - ASSESSMENT
52 YO M with past medical history of AFib (on Eliquis), indwelling Aguilera, cerebral aneurysm, CAD (s/p stents), CVA, T2DM, HTN, OM (s/p debridement), PE, perforated gastric ulcer s/p ometnopexy 2019 with Dr. Mejia, transferred from [facility] for difficulty in breathing.      CAD, Afib, HTN  - s/p cardiac arrest x2 w/ AHRF admitted to the ICU, sedated int/ext, weaned off   levophed and epinephrine,  downgraded to GMF (6/18)  - trops + but no sig trend. No clear evidence of acute ischemia  - continue asa and statin      - known AF   - continue cardizem and bb for rate control  -on fd lovenox for AC    - TTE normal EF and mod-sev mr   - CXR on 6/14 with persistent mild left base infiltrate  - Repeat CXR unchanged   - no sign of volume overload on exam  - c/w lasix 20mg po qd    - ATN with improving creat  - + MRSA, iso , rest management per primary    - Monitor and replete lytes, keep K>4, Mg>2.  - Will continue to follow.    Nery Alex NP  Nurse Practitioner- Cardiology   Call TEAMS

## 2024-06-25 NOTE — PROGRESS NOTE ADULT - ASSESSMENT
54 YO M with  AF on Eliquis, indwelling Aguilera, CAD s/p stents, CVA, DMT 2, Hypertension, osteomyelitis s/p debridement, PE, transferred from [facility] for difficulty in breathing.  His recent admission was for osteomyelitis and discharged on IV Vancomycin. Reported at [facility] he was given a dose of ertapenem and developed difficulty breathing followed by apnea. On ED arrival he was unresponsive, apneic, no palpable pulse or blood pressure, EKG rhythm HR < 20/min W/O P waves. Patient intubated,     RECOMMENDATIONS  1-Asystolic cardiac arrest, Acute hypoxic respiratory failure, Shock  - unclear etiology  -rec avoiding carbapenems and fine with just Vanco  -ICU support and monitoring    2-Osteomyeliits right calcaneous  6/4 S/p Selective debridement of wound 04-Jun-2024 10:52:57  Parviz Todd  6/4  TCx MRSA, Escherichia coli ESBL  Right calcaneus bone pathology:  -   Fragments of bone with chronic osteomyelitis.  -   Focal acute osteomyelitis cannot be ruled out.  C/w vancomycin 750 mg IV q12, goal trough 15-20, dosing per pharmacy protocol and plan had been with inability to rule out osteo rec 6 weeks so last day 7/18  PICC placed 6/7    From an ID standpoint no further requirement for inpatient status for the management of ID issues. Fine with discharge from ID standpoint when other medical issues no longer require inpatient care and social issues allow for a safe discharge plan. To schedule an outpatient ID follow up appointment please call our office at [phone]    Thank you for consulting us and involving us in the management of this most interesting and challenging case.  We will follow along in the care of this patient. Please call us at [phone] or text me directly on my cell# at [phone] with any concerns.

## 2024-06-25 NOTE — SWALLOW BEDSIDE ASSESSMENT ADULT - ADDITIONAL RECOMMENDATIONS
2. This service to follow-up as schedule permits for diet tolerance. 3. Medical team further advised to reconsult this department with any change in medical status and/or observed change in tolerance of recommended PO diet.
2. This service to follow-up as schedule permits for diet advancement 3. Medical team further advised to reconsult this department with any change in medical status and/or observed change in tolerance of recommended PO diet.

## 2024-06-25 NOTE — PROGRESS NOTE ADULT - SUBJECTIVE AND OBJECTIVE BOX
Date of Service 06-25-24 @ 20:22    Patient is a 54y old  Male who presents with a chief complaint of Acute respiratory failure with hypoxia (25 Jun 2024 16:18)      INTERVAL /OVERNIGHT EVENTS: c/o worsening rash and itching    MEDICATIONS  (STANDING):  apixaban 5 milliGRAM(s) Oral two times a day  ascorbic acid 500 milliGRAM(s) Oral daily  aspirin enteric coated 81 milliGRAM(s) Oral daily  atorvastatin 40 milliGRAM(s) Oral at bedtime  benzocaine 20% Spray 1 Spray(s) Topical every 6 hours  calamine/zinc oxide Lotion 1 Application(s) Topical two times a day  chlorhexidine 2% Cloths 1 Application(s) Topical daily  cholecalciferol 1000 Unit(s) Oral daily  clobetasol 0.05% Cream 1 Application(s) Topical two times a day  dextrose 10% Bolus 125 milliLiter(s) IV Bolus once  dextrose 5%. 1000 milliLiter(s) (50 mL/Hr) IV Continuous <Continuous>  dextrose 5%. 1000 milliLiter(s) (100 mL/Hr) IV Continuous <Continuous>  dextrose 50% Injectable 25 Gram(s) IV Push once  dextrose 50% Injectable 12.5 Gram(s) IV Push once  diltiazem    Tablet 120 milliGRAM(s) Oral every 8 hours  famotidine    Tablet 20 milliGRAM(s) Oral two times a day  ferrous    sulfate 325 milliGRAM(s) Oral two times a day  furosemide    Tablet 20 milliGRAM(s) Oral daily  gabapentin 100 milliGRAM(s) Oral every 12 hours  glucagon  Injectable 1 milliGRAM(s) IntraMuscular once  hydrOXYzine hydrochloride 25 milliGRAM(s) Oral two times a day  insulin lispro (ADMELOG) corrective regimen sliding scale   SubCutaneous three times a day before meals  melatonin 5 milliGRAM(s) Oral at bedtime  metFORMIN 1000 milliGRAM(s) Oral two times a day  metoprolol tartrate 50 milliGRAM(s) Oral every 6 hours  multivitamin 1 Tablet(s) Oral daily  naloxegol 25 milliGRAM(s) Oral daily  oxybutynin XL 10 milliGRAM(s) Oral daily  pantoprazole    Tablet 40 milliGRAM(s) Oral before breakfast  polyethylene glycol 3350 17 Gram(s) Oral daily  potassium chloride    Tablet ER 10 milliEquivalent(s) Oral daily  predniSONE   Tablet 10 milliGRAM(s) Oral daily  senna 2 Tablet(s) Oral at bedtime  sucralfate 1 Gram(s) Oral two times a day  tiotropium 2.5 MICROgram(s)/olodaterol 2.5 MICROgram(s) (STIOLTO) Inhaler 2 Puff(s) Inhalation daily  vancomycin  IVPB 750 milliGRAM(s) IV Intermittent every 12 hours    MEDICATIONS  (PRN):  acetaminophen     Tablet .. 650 milliGRAM(s) Oral every 6 hours PRN Mild Pain (1 - 3)  diphenhydrAMINE 25 milliGRAM(s) Oral every 4 hours PRN Rash and/or Itching  metoprolol tartrate Injectable 5 milliGRAM(s) IV Push every 6 hours PRN HR >140  oxyCODONE    IR 5 milliGRAM(s) Oral every 6 hours PRN Moderate Pain (4 - 6)  oxyCODONE    IR 10 milliGRAM(s) Oral every 6 hours PRN Severe Pain (7 - 10)  sodium chloride 0.65% Nasal 1 Spray(s) Both Nostrils three times a day PRN Nasal Congestion  sodium chloride 0.9% lock flush 10 milliLiter(s) IV Push every 1 hour PRN Pre/post blood products, medications, blood draw, and to maintain line patency      Allergies    ertapenem (Blisters; Rash)  fish (Hives)    Intolerances        REVIEW OF SYSTEMS:  CONSTITUTIONAL: No fever, weight loss, or fatigue  EYES: No eye pain, visual disturbances, or discharge  ENMT:  No difficulty hearing, tinnitus, vertigo; No sinus or throat pain  NECK: No pain or stiffness  RESPIRATORY: No cough, wheezing, chills or hemoptysis; No shortness of breath  CARDIOVASCULAR: No chest pain, palpitations, dizziness, or leg swelling  GASTROINTESTINAL: No abdominal or epigastric pain. No nausea, vomiting, or hematemesis; No diarrhea or constipation. No melena or hematochezia.  GENITOURINARY: No dysuria, frequency, hematuria, or incontinence  NEUROLOGICAL: No headaches, memory loss, loss of strength, numbness, or tremors  SKIN: +rashes, or   LYMPH NODES: No enlarged glands  ENDOCRINE: No heat or cold intolerance; No hair loss; No polydipsia or polyuria  MUSCULOSKELETAL: No joint pain or swelling; No muscle, back, or extremity pain  PSYCHIATRIC: No depression, anxiety, mood swings, or difficulty sleeping  HEME/LYMPH: No easy bruising, or bleeding gums  ALLERGY AND IMMUNOLOGIC: No hives or eczema    Vital Signs Last 24 Hrs  T(C): 37.2 (25 Jun 2024 05:00), Max: 37.2 (25 Jun 2024 05:00)  T(F): 98.9 (25 Jun 2024 05:00), Max: 98.9 (25 Jun 2024 05:00)  HR: 101 (25 Jun 2024 12:29) (83 - 101)  BP: 113/55 (25 Jun 2024 12:29) (113/55 - 118/74)  BP(mean): --  RR: 18 (25 Jun 2024 12:29) (17 - 18)  SpO2: 95% (25 Jun 2024 12:29) (90% - 95%)    Parameters below as of 25 Jun 2024 12:29  Patient On (Oxygen Delivery Method): room air        PHYSICAL EXAM:  GENERAL: NAD, well-groomed, well-developed  HEAD:  Atraumatic, Normocephalic  EYES: EOMI, PERRLA, conjunctiva and sclera clear  ENMT: No tonsillar erythema, exudates, or enlargement; Moist mucous membranes, Good dentition, No lesions  NECK: Supple, No JVD, Normal thyroid  NERVOUS SYSTEM:  Alert & Oriented X3, Good concentration; Motor Strength 5/5 B/L upper and lower extremities; DTRs 2+ intact and symmetric  CHEST/LUNG: Clear to auscultation bilaterally; No rales, rhonchi, wheezing, or rubs  HEART: Regular rate and rhythm; No murmurs, rubs, or gallops  ABDOMEN: Soft, Nontender, Nondistended; Bowel sounds present  EXTREMITIES:  2+ Peripheral Pulses, No clubbing, cyanosis, or edema  LYMPH: No lymphadenopathy noted  SKIN: + rashes    LABS:      Ca    8.3        24 Jun 2024 12:40        Urinalysis Basic - ( 24 Jun 2024 12:40 )    Color: x / Appearance: x / SG: x / pH: x  Gluc: 195 mg/dL / Ketone: x  / Bili: x / Urobili: x   Blood: x / Protein: x / Nitrite: x   Leuk Esterase: x / RBC: x / WBC x   Sq Epi: x / Non Sq Epi: x / Bacteria: x      CAPILLARY BLOOD GLUCOSE      POCT Blood Glucose.: 188 mg/dL (25 Jun 2024 17:10)  POCT Blood Glucose.: 248 mg/dL (25 Jun 2024 12:23)  POCT Blood Glucose.: 169 mg/dL (25 Jun 2024 08:32)  POCT Blood Glucose.: 190 mg/dL (24 Jun 2024 21:17)      RADIOLOGY & ADDITIONAL TESTS:    Notes Reviewed:  [x ] YES  [ ] NO    Care Discussed with Consultants/Other Providers [x ] YES  [ ] NO

## 2024-06-25 NOTE — PROGRESS NOTE ADULT - SUBJECTIVE AND OBJECTIVE BOX
[facility]  Juventino Whitten MD PhD, Symone Hickey MD, Anne-Marie Li MD, Jeffery Jacobs MD, Ryan Romeo MD  and providing coverage with Melody Armstrong MD  Providing Infectious Disease Consultations at [facility], [facility], [facility]'s    Office# [phone] to schedule follow up appointments  Answering Service for urgent calls or New Consults [phone]  Cell# to text for urgent issues Juventino Whitten [phone]     infectious diseases progress note:    SARAH MORRISON is a 54y y. o. Male patient    Overnight and events of the last 24hrs reviewed    Allergies    ertapenem (Blisters; Rash)  fish (Hives)    Intolerances        ANTIBIOTICS/RELEVANT:  antimicrobials  vancomycin  IVPB 750 milliGRAM(s) IV Intermittent every 12 hours    immunologic:    OTHER:  acetaminophen     Tablet .. 650 milliGRAM(s) Oral every 6 hours PRN  apixaban 5 milliGRAM(s) Oral two times a day  ascorbic acid 500 milliGRAM(s) Oral daily  aspirin enteric coated 81 milliGRAM(s) Oral daily  atorvastatin 40 milliGRAM(s) Oral at bedtime  benzocaine 20% Spray 1 Spray(s) Topical every 6 hours  calamine/zinc oxide Lotion 1 Application(s) Topical two times a day  chlorhexidine 2% Cloths 1 Application(s) Topical daily  cholecalciferol 1000 Unit(s) Oral daily  clobetasol 0.05% Cream 1 Application(s) Topical two times a day  dextrose 10% Bolus 125 milliLiter(s) IV Bolus once  dextrose 5%. 1000 milliLiter(s) IV Continuous <Continuous>  dextrose 5%. 1000 milliLiter(s) IV Continuous <Continuous>  dextrose 50% Injectable 25 Gram(s) IV Push once  dextrose 50% Injectable 12.5 Gram(s) IV Push once  diltiazem    Tablet 120 milliGRAM(s) Oral every 8 hours  diphenhydrAMINE 25 milliGRAM(s) Oral every 4 hours PRN  famotidine    Tablet 20 milliGRAM(s) Oral two times a day  ferrous    sulfate 325 milliGRAM(s) Oral two times a day  furosemide    Tablet 20 milliGRAM(s) Oral daily  gabapentin 100 milliGRAM(s) Oral every 12 hours  glucagon  Injectable 1 milliGRAM(s) IntraMuscular once  hydrOXYzine hydrochloride 25 milliGRAM(s) Oral two times a day  insulin lispro (ADMELOG) corrective regimen sliding scale   SubCutaneous three times a day before meals  melatonin 5 milliGRAM(s) Oral at bedtime  metFORMIN 1000 milliGRAM(s) Oral two times a day  methylPREDNISolone sodium succinate Injectable 40 milliGRAM(s) IV Push once  metoprolol tartrate 50 milliGRAM(s) Oral every 6 hours  metoprolol tartrate Injectable 5 milliGRAM(s) IV Push every 6 hours PRN  multivitamin 1 Tablet(s) Oral daily  naloxegol 25 milliGRAM(s) Oral daily  oxybutynin XL 10 milliGRAM(s) Oral daily  oxyCODONE    IR 5 milliGRAM(s) Oral every 6 hours PRN  oxyCODONE    IR 10 milliGRAM(s) Oral every 6 hours PRN  pantoprazole    Tablet 40 milliGRAM(s) Oral before breakfast  polyethylene glycol 3350 17 Gram(s) Oral daily  potassium chloride    Tablet ER 10 milliEquivalent(s) Oral daily  predniSONE   Tablet 10 milliGRAM(s) Oral daily  senna 2 Tablet(s) Oral at bedtime  sodium chloride 0.65% Nasal 1 Spray(s) Both Nostrils three times a day PRN  sodium chloride 0.9% lock flush 10 milliLiter(s) IV Push every 1 hour PRN  sucralfate 1 Gram(s) Oral two times a day  tiotropium 2.5 MICROgram(s)/olodaterol 2.5 MICROgram(s) (STIOLTO) Inhaler 2 Puff(s) Inhalation daily      Objective:  Vital Signs Last 24 Hrs  T(C): 37.2 (25 Jun 2024 05:00), Max: 37.2 (25 Jun 2024 05:00)  T(F): 98.9 (25 Jun 2024 05:00), Max: 98.9 (25 Jun 2024 05:00)  HR: 101 (25 Jun 2024 12:29) (83 - 108)  BP: 113/55 (25 Jun 2024 12:29) (101/64 - 118/74)  BP(mean): --  RR: 18 (25 Jun 2024 12:29) (17 - 18)  SpO2: 95% (25 Jun 2024 12:29) (90% - 95%)    Parameters below as of 25 Jun 2024 12:29  Patient On (Oxygen Delivery Method): room air        T(C): 37.2 (06-25-24 @ 05:00), Max: 37.2 (06-25-24 @ 05:00)  T(C): 37.2 (06-25-24 @ 05:00), Max: 37.3 (06-23-24 @ 05:00)  T(C): 37.2 (06-25-24 @ 05:00), Max: 37.3 (06-23-24 @ 05:00)    PHYSICAL EXAM:  HEENT: NC atraumatic  Neck: supple  Respiratory: no accessory muscle use, breathing comfortably  Cardiovascular: distant  Gastrointestinal: normal appearing, nondistended  Extremities: no clubbing, no cyanosis,        LABS:                          10.5   9.99  )-----------( 334      ( 24 Jun 2024 12:40 )             33.8       WBC  9.99 06-24 @ 12:40  6.63 06-22 @ 14:08  7.83 06-20 @ 12:20  12.09 06-19 @ 06:15      06-24    136  |  102  |  9   ----------------------------<  195<H>  3.8   |  25  |  1.20    Ca    8.3<L>      24 Jun 2024 12:40    TPro  6.1  /  Alb  2.0<L>  /  TBili  0.3  /  DBili  x   /  AST  13<L>  /  ALT  34  /  AlkPhos  92  06-24      Creatinine: 1.20 mg/dL (06-24-24 @ 12:40)  Creatinine: 1.10 mg/dL (06-22-24 @ 14:08)  Creatinine: 1.10 mg/dL (06-20-24 @ 12:20)  Creatinine: 1.00 mg/dL (06-19-24 @ 06:15)        Urinalysis Basic - ( 24 Jun 2024 12:40 )    Color: x / Appearance: x / SG: x / pH: x  Gluc: 195 mg/dL / Ketone: x  / Bili: x / Urobili: x   Blood: x / Protein: x / Nitrite: x   Leuk Esterase: x / RBC: x / WBC x   Sq Epi: x / Non Sq Epi: x / Bacteria: x            INFLAMMATORY MARKERS      MICROBIOLOGY:              RADIOLOGY & ADDITIONAL STUDIES:

## 2024-06-25 NOTE — PHARMACOTHERAPY INTERVENTION NOTE - COMMENTS
Vancomycin Dosing Per Pharmacy:  Patient is a 54 yo male being treated for R heel wound/osteomyelitis, currently ordered for IV vancomycin. Dose reduced on 6/24 to 750mg Q12H.     1. Indication for the vancomycin: R heel wound/osteomyelitis  2. Requesting Provider: Dr. Whitten  3. Current plan and dosing regimen: 750mg Q12   4. Day of therapy: 22  5. Cultures: Tissue Cx 5/26/24: E faecalis + MRSA. Tissue Cx 6/4/24: ESBL E coli + E faecalis. R heel surgical swab 6/14/24: MRSA + citrobacter freundii + pseudomonas stutzeri.  6. Target trough or AUC/BAYLEE: 400-600  7. Day and time level is due: 6/26 08:00  8. Previous levels: 6/4 (19:31): 22, 6/5 (05:47): 14, 6/6 (20:20): 14, 6/9 (04:58): 14.4, 6/11 (04:30): 15.7, 6/13 (05:54): 17.3, 6/14 (01:25): 15.2, 6/14 (04:30): 16.1, 6/15 (5:40): 17.5, 6/16 (5:38): 15.3, 6/17 (05:52): 13.2, 6/18 (05:50): 11.8, 6/19 (06:15): 21.3, 6/20 (08:52): 21.6, 6/21 (09:30): 37.5, 06/22 (0945): 11.1, 06/23 (09:30): 15.9, 06/24 (08:00): 19.6  9. Expected 24-hour AUC: 497

## 2024-06-25 NOTE — SWALLOW BEDSIDE ASSESSMENT ADULT - COMMENTS
As per Family Medicine Note dated 6/24/24 "Claude Villareal is a 54 YO M with past medical history of  AF on Eliquis, indwelling Aguilera, CAD s/p stents, CVA, DMT 2, Hypertension, osteomyelitis s/p debridement, PE, transferred from [facility] for difficulty in breathing.  His recent admission was for osteomyelitis and discharged on IV Vancomycin. On ED arrival he is unresponsive, apneic, no palpable pulse or blood pressure, EKG rhythm HR < 20/min W/O P waves. Patient intubated, Code ACLS activated, IV epinephrine, IV atropine administered, pulse became palpable, still Hypotensive, IV levophed administered. Admitted to ICU consulted,  Patient woke up and agitated try to pull out ETT,  IV propofol started,"    CXR 6/18/24 "IMPRESSION: Endotracheal tube and nasogastric tube removed. Improving left base infiltrate."    Patient is known to this service, seen for a clinical swallow evaluation on 6/18/24 with recommendation of Minced and Moist Solids with Thin Liquids (see note for details).     Per RD Note 6/24 "Pt requesting regular solid foods. Recommend SLP reassessment."    Patient visited at bedside for swallow follow-up. Patient presents as awake and alert, consuming breakfast meal. Patient denies difficulty swallowing and reports dislike for current diet consistency.
As per Critical Care Note dated 6/17/24 "53 year old male with a PMH of AF on Eliquis, indwelling jacques, CAD s/p stents, CVA, DM, HTN, hx PE, recent hospitalization discharged yesterday after an admission for R heel osteomyelitis w/ debridement who presented to the ED from [facility] for difficulty breathing."    CT Chest 6/14/24 "IMPRESSION: Numerous mildly distended gas-filled and fluid-filled small bowel loops with air-fluid levels and no transition point involving the entire small bowel is most suggestive of ileus. No ascites or pneumoperitoneum. Emphysema. Preliminary report: Motion degraded exam. Emphysema. Mildly dilated loops of small bowel with gradual transition, most likely reflecting ileus. Early/developing obstruction not fully excluded."    Patient visited at bedside for clinical swallow evaluation. Patient presents as awake and alert, able to follow 1-step directives and make basic wants/needs known. Patient denies difficulty swallowing. Per discussion with RN, patient passed dysphagia screening.

## 2024-06-25 NOTE — PROGRESS NOTE ADULT - SUBJECTIVE AND OBJECTIVE BOX
SUBJECTIVE:  54y year old Male seen at [facility] 326 D1 for right heel ulcer s/p debridement with application of antibiotic beads. Currently granulating well with portions of fibrotic tissue. Denies any fever, chills, nausea, vomiting, chest pain, shortness of breath, or calf pain at this time.    Allergies    ertapenem (Blisters; Rash)  fish (Hives)    Intolerances        MEDICATIONS  (STANDING):  apixaban 5 milliGRAM(s) Oral two times a day  ascorbic acid 500 milliGRAM(s) Oral daily  aspirin enteric coated 81 milliGRAM(s) Oral daily  atorvastatin 40 milliGRAM(s) Oral at bedtime  benzocaine 20% Spray 1 Spray(s) Topical every 6 hours  calamine/zinc oxide Lotion 1 Application(s) Topical two times a day  chlorhexidine 2% Cloths 1 Application(s) Topical daily  cholecalciferol 1000 Unit(s) Oral daily  clobetasol 0.05% Cream 1 Application(s) Topical two times a day  dextrose 10% Bolus 125 milliLiter(s) IV Bolus once  dextrose 5%. 1000 milliLiter(s) (50 mL/Hr) IV Continuous <Continuous>  dextrose 5%. 1000 milliLiter(s) (100 mL/Hr) IV Continuous <Continuous>  dextrose 50% Injectable 25 Gram(s) IV Push once  dextrose 50% Injectable 12.5 Gram(s) IV Push once  diltiazem    Tablet 120 milliGRAM(s) Oral every 8 hours  famotidine    Tablet 20 milliGRAM(s) Oral two times a day  ferrous    sulfate 325 milliGRAM(s) Oral two times a day  furosemide    Tablet 20 milliGRAM(s) Oral daily  gabapentin 100 milliGRAM(s) Oral every 12 hours  glucagon  Injectable 1 milliGRAM(s) IntraMuscular once  hydrOXYzine hydrochloride 25 milliGRAM(s) Oral two times a day  insulin lispro (ADMELOG) corrective regimen sliding scale   SubCutaneous three times a day before meals  melatonin 5 milliGRAM(s) Oral at bedtime  metFORMIN 1000 milliGRAM(s) Oral two times a day  methylPREDNISolone sodium succinate Injectable 40 milliGRAM(s) IV Push once  metoprolol tartrate 50 milliGRAM(s) Oral every 6 hours  multivitamin 1 Tablet(s) Oral daily  naloxegol 25 milliGRAM(s) Oral daily  oxybutynin XL 10 milliGRAM(s) Oral daily  pantoprazole    Tablet 40 milliGRAM(s) Oral before breakfast  polyethylene glycol 3350 17 Gram(s) Oral daily  potassium chloride    Tablet ER 10 milliEquivalent(s) Oral daily  predniSONE   Tablet 10 milliGRAM(s) Oral daily  senna 2 Tablet(s) Oral at bedtime  sucralfate 1 Gram(s) Oral two times a day  tiotropium 2.5 MICROgram(s)/olodaterol 2.5 MICROgram(s) (STIOLTO) Inhaler 2 Puff(s) Inhalation daily  vancomycin  IVPB 750 milliGRAM(s) IV Intermittent every 12 hours    MEDICATIONS  (PRN):  acetaminophen     Tablet .. 650 milliGRAM(s) Oral every 6 hours PRN Mild Pain (1 - 3)  diphenhydrAMINE 25 milliGRAM(s) Oral every 4 hours PRN Rash and/or Itching  metoprolol tartrate Injectable 5 milliGRAM(s) IV Push every 6 hours PRN HR >140  oxyCODONE    IR 5 milliGRAM(s) Oral every 6 hours PRN Moderate Pain (4 - 6)  oxyCODONE    IR 10 milliGRAM(s) Oral every 6 hours PRN Severe Pain (7 - 10)  sodium chloride 0.65% Nasal 1 Spray(s) Both Nostrils three times a day PRN Nasal Congestion  sodium chloride 0.9% lock flush 10 milliLiter(s) IV Push every 1 hour PRN Pre/post blood products, medications, blood draw, and to maintain line patency      Vital Signs Last 24 Hrs  T(C): 37.2 (25 Jun 2024 05:00), Max: 37.2 (25 Jun 2024 05:00)  T(F): 98.9 (25 Jun 2024 05:00), Max: 98.9 (25 Jun 2024 05:00)  HR: 101 (25 Jun 2024 12:29) (83 - 108)  BP: 113/55 (25 Jun 2024 12:29) (101/64 - 118/74)  BP(mean): --  RR: 18 (25 Jun 2024 12:29) (17 - 18)  SpO2: 95% (25 Jun 2024 12:29) (90% - 95%)    Parameters below as of 25 Jun 2024 12:29  Patient On (Oxygen Delivery Method): room air        PHYSICAL EXAM:  Vascular: DP & PT faintly palpable to the right foot, Capillary refill 4 seconds  Neurological: Light touch sensation not intact bilaterally  Musculoskeletal: left foot s/p midfoot amputation, healed.  Dermatological: Right posterior heel ulceration down to skin, subcutaneous tissue, fat, bone, fibrogranular base.                          10.5   9.99  )-----------( 334      ( 24 Jun 2024 12:40 )             33.8       06-24    136  |  102  |  9   ----------------------------<  195<H>  3.8   |  25  |  1.20    Ca    8.3<L>      24 Jun 2024 12:40    TPro  6.1  /  Alb  2.0<L>  /  TBili  0.3  /  DBili  x   /  AST  13<L>  /  ALT  34  /  AlkPhos  92  06-24

## 2024-06-25 NOTE — SOCIAL WORK PROGRESS NOTE - NSSWPROGRESSNOTE_GEN_ALL_CORE
Discussed at daily rounds. Informed that MD anticipates d/c in next 24 to 48 hours. Referral sent to [facility] and they will take back when ready for d/c. Social work to follow.

## 2024-06-26 DIAGNOSIS — R11.2 NAUSEA WITH VOMITING, UNSPECIFIED: ICD-10-CM

## 2024-06-26 LAB
ANION GAP SERPL CALC-SCNC: 8 MMOL/L — SIGNIFICANT CHANGE UP (ref 5–17)
BUN SERPL-MCNC: 19 MG/DL — SIGNIFICANT CHANGE UP (ref 7–23)
CALCIUM SERPL-MCNC: 8.9 MG/DL — SIGNIFICANT CHANGE UP (ref 8.5–10.1)
CHLORIDE SERPL-SCNC: 104 MMOL/L — SIGNIFICANT CHANGE UP (ref 96–108)
CO2 SERPL-SCNC: 25 MMOL/L — SIGNIFICANT CHANGE UP (ref 22–31)
CREAT SERPL-MCNC: 1.4 MG/DL — HIGH (ref 0.5–1.3)
EGFR: 59 ML/MIN/1.73M2 — LOW
GLUCOSE BLDC GLUCOMTR-MCNC: 283 MG/DL — HIGH (ref 70–99)
GLUCOSE BLDC GLUCOMTR-MCNC: 292 MG/DL — HIGH (ref 70–99)
GLUCOSE BLDC GLUCOMTR-MCNC: 314 MG/DL — HIGH (ref 70–99)
GLUCOSE BLDC GLUCOMTR-MCNC: 321 MG/DL — HIGH (ref 70–99)
GLUCOSE BLDC GLUCOMTR-MCNC: 381 MG/DL — HIGH (ref 70–99)
GLUCOSE BLDC GLUCOMTR-MCNC: 387 MG/DL — HIGH (ref 70–99)
GLUCOSE SERPL-MCNC: 357 MG/DL — HIGH (ref 70–99)
HCT VFR BLD CALC: 32.5 % — LOW (ref 39–50)
HGB BLD-MCNC: 10.7 G/DL — LOW (ref 13–17)
MCHC RBC-ENTMCNC: 30.5 PG — SIGNIFICANT CHANGE UP (ref 27–34)
MCHC RBC-ENTMCNC: 32.9 GM/DL — SIGNIFICANT CHANGE UP (ref 32–36)
MCV RBC AUTO: 92.6 FL — SIGNIFICANT CHANGE UP (ref 80–100)
NRBC # BLD: 0 /100 WBCS — SIGNIFICANT CHANGE UP (ref 0–0)
PLATELET # BLD AUTO: 327 K/UL — SIGNIFICANT CHANGE UP (ref 150–400)
POTASSIUM SERPL-MCNC: 4.2 MMOL/L — SIGNIFICANT CHANGE UP (ref 3.5–5.3)
POTASSIUM SERPL-SCNC: 4.2 MMOL/L — SIGNIFICANT CHANGE UP (ref 3.5–5.3)
RBC # BLD: 3.51 M/UL — LOW (ref 4.2–5.8)
RBC # FLD: 14.2 % — SIGNIFICANT CHANGE UP (ref 10.3–14.5)
SODIUM SERPL-SCNC: 137 MMOL/L — SIGNIFICANT CHANGE UP (ref 135–145)
VANCOMYCIN TROUGH SERPL-MCNC: 14.9 UG/ML — SIGNIFICANT CHANGE UP (ref 10–20)
WBC # BLD: 11.48 K/UL — HIGH (ref 3.8–10.5)
WBC # FLD AUTO: 11.48 K/UL — HIGH (ref 3.8–10.5)

## 2024-06-26 PROCEDURE — 99232 SBSQ HOSP IP/OBS MODERATE 35: CPT

## 2024-06-26 RX ORDER — OXYCODONE HYDROCHLORIDE 100 MG/5ML
10 SOLUTION ORAL EVERY 6 HOURS
Refills: 0 | Status: DISCONTINUED | OUTPATIENT
Start: 2024-06-26 | End: 2024-06-27

## 2024-06-26 RX ORDER — ONDANSETRON HYDROCHLORIDE 2 MG/ML
4 INJECTION INTRAMUSCULAR; INTRAVENOUS EVERY 6 HOURS
Refills: 0 | Status: DISCONTINUED | OUTPATIENT
Start: 2024-06-26 | End: 2024-07-05

## 2024-06-26 RX ORDER — SODIUM CHLORIDE 0.9 % (FLUSH) 0.9 %
500 SYRINGE (ML) INJECTION ONCE
Refills: 0 | Status: COMPLETED | OUTPATIENT
Start: 2024-06-26 | End: 2024-06-26

## 2024-06-26 RX ORDER — OXYCODONE HYDROCHLORIDE 100 MG/5ML
5 SOLUTION ORAL EVERY 6 HOURS
Refills: 0 | Status: DISCONTINUED | OUTPATIENT
Start: 2024-06-26 | End: 2024-06-28

## 2024-06-26 RX ORDER — FAMOTIDINE 40 MG
20 TABLET ORAL ONCE
Refills: 0 | Status: COMPLETED | OUTPATIENT
Start: 2024-06-26 | End: 2024-06-26

## 2024-06-26 RX ORDER — PANTOPRAZOLE SODIUM 40 MG/10ML
40 INJECTION, POWDER, FOR SOLUTION INTRAVENOUS
Refills: 0 | Status: DISCONTINUED | OUTPATIENT
Start: 2024-06-26 | End: 2024-07-05

## 2024-06-26 RX ADMIN — Medication 500 MILLIGRAM(S): at 12:51

## 2024-06-26 RX ADMIN — Medication 1000 MILLILITER(S): at 14:00

## 2024-06-26 RX ADMIN — Medication 100 MILLIGRAM(S): at 06:49

## 2024-06-26 RX ADMIN — Medication 20 MILLIGRAM(S): at 06:47

## 2024-06-26 RX ADMIN — HYDROXYZINE PAMOATE 25 MILLIGRAM(S): 50 CAPSULE ORAL at 18:37

## 2024-06-26 RX ADMIN — Medication 25 MILLIGRAM(S): at 18:49

## 2024-06-26 RX ADMIN — INSULIN LISPRO 2: 100 INJECTION, SOLUTION SUBCUTANEOUS at 21:22

## 2024-06-26 RX ADMIN — CLOBETASOL PROPIONATE 1 APPLICATION(S): 0.5 CREAM TOPICAL at 06:58

## 2024-06-26 RX ADMIN — OXYCODONE HYDROCHLORIDE 10 MILLIGRAM(S): 100 SOLUTION ORAL at 18:49

## 2024-06-26 RX ADMIN — Medication 50 MILLIGRAM(S): at 00:41

## 2024-06-26 RX ADMIN — ONDANSETRON HYDROCHLORIDE 4 MILLIGRAM(S): 2 INJECTION INTRAMUSCULAR; INTRAVENOUS at 10:02

## 2024-06-26 RX ADMIN — FUROSEMIDE 20 MILLIGRAM(S): 10 INJECTION, SOLUTION INTRAMUSCULAR; INTRAVENOUS at 06:48

## 2024-06-26 RX ADMIN — APIXABAN 5 MILLIGRAM(S): 5 TABLET, FILM COATED ORAL at 06:48

## 2024-06-26 RX ADMIN — PANTOPRAZOLE SODIUM 40 MILLIGRAM(S): 40 INJECTION, POWDER, FOR SOLUTION INTRAVENOUS at 06:47

## 2024-06-26 RX ADMIN — Medication 20 MILLIGRAM(S): at 01:50

## 2024-06-26 RX ADMIN — Medication 1 GRAM(S): at 06:48

## 2024-06-26 RX ADMIN — Medication 50 MILLIGRAM(S): at 06:47

## 2024-06-26 RX ADMIN — ONDANSETRON HYDROCHLORIDE 4 MILLIGRAM(S): 2 INJECTION INTRAMUSCULAR; INTRAVENOUS at 01:50

## 2024-06-26 RX ADMIN — TIOTROPIUM BROMIDE AND OLODATEROL 2 PUFF(S): 3.124; 2.736 SPRAY, METERED RESPIRATORY (INHALATION) at 06:50

## 2024-06-26 RX ADMIN — Medication 1 TABLET(S): at 12:51

## 2024-06-26 RX ADMIN — POTASSIUM CHLORIDE 10 MILLIEQUIVALENT(S): 600 TABLET, FILM COATED, EXTENDED RELEASE ORAL at 13:13

## 2024-06-26 RX ADMIN — Medication 25 MILLIGRAM(S): at 06:48

## 2024-06-26 RX ADMIN — Medication 1 APPLICATION(S): at 06:51

## 2024-06-26 RX ADMIN — VANCOMYCIN HYDROCHLORIDE 250 MILLIGRAM(S): KIT at 21:02

## 2024-06-26 RX ADMIN — VANCOMYCIN HYDROCHLORIDE 250 MILLIGRAM(S): KIT at 10:56

## 2024-06-26 RX ADMIN — NALOXEGOL OXALATE 25 MILLIGRAM(S): 25 TABLET, FILM COATED ORAL at 12:52

## 2024-06-26 RX ADMIN — INSULIN LISPRO 10: 100 INJECTION, SOLUTION SUBCUTANEOUS at 12:52

## 2024-06-26 RX ADMIN — METFORMIN HYDROCHLORIDE 1000 MILLIGRAM(S): 850 TABLET, FILM COATED ORAL at 06:47

## 2024-06-26 RX ADMIN — Medication 2 TABLET(S): at 21:02

## 2024-06-26 RX ADMIN — DILTIAZEM HYDROCHLORIDE 120 MILLIGRAM(S): 240 CAPSULE, EXTENDED RELEASE ORAL at 06:47

## 2024-06-26 RX ADMIN — Medication 1 APPLICATION(S): at 13:31

## 2024-06-26 RX ADMIN — PREDNISONE 10 MILLIGRAM(S): 10 TABLET ORAL at 06:50

## 2024-06-26 RX ADMIN — Medication 10 MILLIGRAM(S): at 12:51

## 2024-06-26 RX ADMIN — HYDROXYZINE PAMOATE 25 MILLIGRAM(S): 50 CAPSULE ORAL at 06:50

## 2024-06-26 RX ADMIN — METFORMIN HYDROCHLORIDE 1000 MILLIGRAM(S): 850 TABLET, FILM COATED ORAL at 18:37

## 2024-06-26 RX ADMIN — ATORVASTATIN CALCIUM 40 MILLIGRAM(S): 20 TABLET, FILM COATED ORAL at 21:02

## 2024-06-26 RX ADMIN — Medication 5 MILLIGRAM(S): at 21:02

## 2024-06-26 RX ADMIN — INSULIN LISPRO 6: 100 INJECTION, SOLUTION SUBCUTANEOUS at 08:50

## 2024-06-26 RX ADMIN — Medication 325 MILLIGRAM(S): at 06:49

## 2024-06-26 RX ADMIN — Medication 100 MILLIGRAM(S): at 18:36

## 2024-06-26 RX ADMIN — ASPIRIN 81 MILLIGRAM(S): 325 TABLET, FILM COATED ORAL at 12:51

## 2024-06-26 RX ADMIN — Medication 1000 UNIT(S): at 12:51

## 2024-06-26 RX ADMIN — PANTOPRAZOLE SODIUM 40 MILLIGRAM(S): 40 INJECTION, POWDER, FOR SOLUTION INTRAVENOUS at 18:37

## 2024-06-26 RX ADMIN — Medication 25 MILLIGRAM(S): at 13:14

## 2024-06-26 RX ADMIN — APIXABAN 5 MILLIGRAM(S): 5 TABLET, FILM COATED ORAL at 18:37

## 2024-06-26 RX ADMIN — INSULIN LISPRO 8: 100 INJECTION, SOLUTION SUBCUTANEOUS at 18:38

## 2024-06-26 RX ADMIN — POLYETHYLENE GLYCOL 3350 17 GRAM(S): 1 POWDER ORAL at 13:14

## 2024-06-26 RX ADMIN — Medication 1 GRAM(S): at 18:44

## 2024-06-26 NOTE — PHARMACOTHERAPY INTERVENTION NOTE - COMMENTS
Vancomycin Dosing Per Pharmacy:  Patient is a 54 yo male being treated for R heel wound/osteomyelitis, currently ordered for IV vancomycin. Dose reduced on 6/24 to 750mg Q12H.     1. Indication for the vancomycin: R heel wound/osteomyelitis  2. Requesting Provider: Dr. Whitten  3. Current plan and dosing regimen: 750mg Q12   4. Day of therapy: 23  5. Cultures: Tissue Cx 5/26/24: E faecalis + MRSA. Tissue Cx 6/4/24: ESBL E coli + E faecalis. R heel surgical swab 6/14/24: MRSA + citrobacter freundii + pseudomonas stutzeri.  6. Target trough or AUC/BAYLEE: 400-600  7. Day and time level is due: 6/28 08:00  8. Previous levels: 6/4 (19:31): 22, 6/5 (05:47): 14, 6/6 (20:20): 14, 6/9 (04:58): 14.4, 6/11 (04:30): 15.7, 6/13 (05:54): 17.3, 6/14 (01:25): 15.2, 6/14 (04:30): 16.1, 6/15 (5:40): 17.5, 6/16 (5:38): 15.3, 6/17 (05:52): 13.2, 6/18 (05:50): 11.8, 6/19 (06:15): 21.3, 6/20 (08:52): 21.6, 6/21 (09:30): 37.5, 06/22 (0945): 11.1, 06/23 (09:30): 15.9, 06/24 (08:00): 19.6, 06/26 (09:47): 14.9   9. Expected 24-hour AUC: 497

## 2024-06-26 NOTE — PROGRESS NOTE ADULT - SUBJECTIVE AND OBJECTIVE BOX
[facility] -- Brittany Lutz, Reynaldo Sweeney Savella, , Gera Maya  Office # [phone]    Follow Up:  s/p Cardiac Arrest, Afib with SVR, Diastolic HF    Subjective/Observations: Awake and alert, comfortable on RA.  Denies any form of respiratory or cardiac discomfort.  No overnight events    REVIEW OF SYSTEMS: All other review of systems is negative unless indicated above  PAST MEDICAL & SURGICAL HISTORY:  Diabetes      Diabetes mellitus with no complication      Afib      Hypertension      BPH (benign prostatic hyperplasia)      Perforated gastric ulcer  s/p emergent ex-lap omentopexy and plication 6/2019      Pulmonary embolism      History of non-ST elevation myocardial infarction (NSTEMI)      Osteomyelitis  s/p debridement      CAD S/P percutaneous coronary angioplasty      Cerebrovascular accident      H/O abdominal surgery      Perforated gastric ulcer      Traumatic amputation of left foot, initial encounter        MEDICATIONS  (STANDING):  apixaban 5 milliGRAM(s) Oral two times a day  ascorbic acid 500 milliGRAM(s) Oral daily  aspirin enteric coated 81 milliGRAM(s) Oral daily  atorvastatin 40 milliGRAM(s) Oral at bedtime  benzocaine 20% Spray 1 Spray(s) Topical every 6 hours  calamine/zinc oxide Lotion 1 Application(s) Topical two times a day  chlorhexidine 2% Cloths 1 Application(s) Topical daily  cholecalciferol 1000 Unit(s) Oral daily  clobetasol 0.05% Cream 1 Application(s) Topical two times a day  dextrose 10% Bolus 125 milliLiter(s) IV Bolus once  dextrose 5%. 1000 milliLiter(s) (50 mL/Hr) IV Continuous <Continuous>  dextrose 5%. 1000 milliLiter(s) (100 mL/Hr) IV Continuous <Continuous>  dextrose 50% Injectable 25 Gram(s) IV Push once  dextrose 50% Injectable 12.5 Gram(s) IV Push once  diltiazem    Tablet 120 milliGRAM(s) Oral every 8 hours  furosemide    Tablet 20 milliGRAM(s) Oral daily  gabapentin 100 milliGRAM(s) Oral every 12 hours  glucagon  Injectable 1 milliGRAM(s) IntraMuscular once  hydrOXYzine hydrochloride 25 milliGRAM(s) Oral two times a day  insulin lispro (ADMELOG) corrective regimen sliding scale   SubCutaneous three times a day before meals  insulin lispro (ADMELOG) corrective regimen sliding scale   SubCutaneous at bedtime  melatonin 5 milliGRAM(s) Oral at bedtime  metFORMIN 1000 milliGRAM(s) Oral two times a day  metoprolol tartrate 50 milliGRAM(s) Oral every 6 hours  multivitamin 1 Tablet(s) Oral daily  naloxegol 25 milliGRAM(s) Oral daily  oxybutynin XL 10 milliGRAM(s) Oral daily  pantoprazole    Tablet 40 milliGRAM(s) Oral two times a day  polyethylene glycol 3350 17 Gram(s) Oral daily  potassium chloride    Tablet ER 10 milliEquivalent(s) Oral daily  predniSONE   Tablet 10 milliGRAM(s) Oral daily  senna 2 Tablet(s) Oral at bedtime  sucralfate 1 Gram(s) Oral two times a day  tiotropium 2.5 MICROgram(s)/olodaterol 2.5 MICROgram(s) (STIOLTO) Inhaler 2 Puff(s) Inhalation daily  vancomycin  IVPB 750 milliGRAM(s) IV Intermittent every 12 hours    MEDICATIONS  (PRN):  acetaminophen     Tablet .. 650 milliGRAM(s) Oral every 6 hours PRN Mild Pain (1 - 3)  diphenhydrAMINE 25 milliGRAM(s) Oral every 4 hours PRN Rash and/or Itching  metoprolol tartrate Injectable 5 milliGRAM(s) IV Push every 6 hours PRN HR >140  ondansetron Injectable 4 milliGRAM(s) IV Push every 6 hours PRN Nausea and/or Vomiting  sodium chloride 0.65% Nasal 1 Spray(s) Both Nostrils three times a day PRN Nasal Congestion  sodium chloride 0.9% lock flush 10 milliLiter(s) IV Push every 1 hour PRN Pre/post blood products, medications, blood draw, and to maintain line patency    Allergies    ertapenem (Blisters; Rash)  fish (Hives)    Intolerances      Vital Signs Last 24 Hrs  T(C): 37.1 (25 Jun 2024 20:44), Max: 37.1 (25 Jun 2024 20:44)  T(F): 98.8 (25 Jun 2024 20:44), Max: 98.8 (25 Jun 2024 20:44)  HR: 110 (26 Jun 2024 05:16) (87 - 110)  BP: 126/90 (26 Jun 2024 05:16) (103/64 - 126/90)  BP(mean): --  RR: 18 (26 Jun 2024 05:16) (18 - 18)  SpO2: 95% (26 Jun 2024 05:16) (93% - 95%)    Parameters below as of 25 Jun 2024 20:44  Patient On (Oxygen Delivery Method): room air      I&O's Summary    25 Jun 2024 07:01  -  26 Jun 2024 07:00  --------------------------------------------------------  IN: 0 mL / OUT: 2600 mL / NET: -2600 mL    26 Jun 2024 07:01  -  26 Jun 2024 12:04  --------------------------------------------------------  IN: 0 mL / OUT: 650 mL / NET: -650 mL    PHYSICAL EXAM:  TELE: Not on tele  Constitutional: NAD, awake and alert  HEENT: Moist Mucous Membranes, Anicteric  Pulmonary: Non-labored, breath sounds are clear but diminished bilaterally, No wheezing, rales or rhonchi  Cardiovascular: IRRR, S1 and S2, +murmurs, no rubs, gallops or clicks  Gastrointestinal: Bowel Sounds present, soft, nontender.   Lymph: No peripheral edema. No lymphadenopathy.  Skin: No visible rashes or ulcers.  skin breakdown on sacrum  Psych:  Mood & affect appropriate  LABS: All Labs Reviewed:                        10.5   9.99  )-----------( 334      ( 24 Jun 2024 12:40 )             33.8     24 Jun 2024 12:40    136    |  102    |  9      ----------------------------<  195    3.8     |  25     |  1.20     Ca    8.3        24 Jun 2024 12:40    TPro  6.1    /  Alb  2.0    /  TBili  0.3    /  DBili  x      /  AST  13     /  ALT  34     /  AlkPhos  92     24 Jun 2024 12:40    12 Lead ECG:   Ventricular Rate 126 BPM    QRS Duration 76 ms    Q-T Interval 302 ms    QTC Calculation(Bazett) 437 ms    R Axis -12 degrees    T Axis 103 degrees    Diagnosis Line Atrial fibrillation with rapid ventricular response  Low voltage QRS  Nonspecific ST and T wave abnormality  Abnormal ECG  When compared with ECG of 14-JUN-2024 11:03,  Questionable change in QRS axis  Nonspecific T wave abnormality now evident in Inferior leads  Nonspecific T wave abnormality, worse in Anterolateral leads  Confirmed by Juventino Soto MD ([id number]) on 6/20/2024 12:52:09 PM (06-20-24 @ 11:37)      TRANSTHORACIC ECHOCARDIOGRAM REPORT  ________________________________________________________________________________                                      _______       Pt. Name:       SARAH MORRISON Study Date:    6/14/2024  MRN:            [id number]         YOB: 1970  Accession #:    [id number]        Age:           53 years  Account#:       [id number]       Gender:        M  Visit ID#  Heart Rate:                      Height:        72.05 in (183.00 cm)  Rhythm:          Weight:        222.66 lb (101.00 kg)  Blood Pressure: 88/53 mmHg       BSA/BMI:       2.23 m² / 30.16 kg/m²  ________________________________________________________________________________________  Referring Physician:    [id number] Hill Brizuela  Interpreting Physician: Rahel Boss MD  Primary Sonographer:    Mounika FELDMAN    CPT:               ECHO TTE WO CON COMP W DOPP - 11231.m  Indication(s):     Heart failure, unspecified - I50.9  Procedure:         Transthoracic echocardiogram with 2-D, M-mode and complete                     spectral and color flow Doppler.  Ordering Location: ICU1  Admission Status:  Inpatient  _______________________________________________________________________________________     CONCLUSIONS:      1. Left ventricular cavity is normal in size. Left ventricular systolic function is normal with an ejection fraction visually estimated at 55 to 60 %. There are no regional wall motion abnormalities seen.   2. Moderate to severe left ventricular hypertrophy.   3. Normal right ventricular cavity size, with normal wall thickness, and normal systolic function.   4. The left atrium is severely dilated.   5. The right atrium is severely dilated.   6. Trileaflet aortic valve with normal systolic excursion. There is focal calcification of the aortic valve leaflets.   7. Mild to moderate mitral regurgitation.   8. There is moderate calcification of the mitral valve annulus.   9. Structurally normal tricuspid valve with normal leaflet excursion.Mild tricuspid regurgitation.  10. The inferior vena cava is normal in size measuring 1.70 cm in diameter, (normal <2.1cm) with normal inspiratory collapse (normal >50%) consistent with normal right atrial pressure (~3, range 0-5mmHg).  11. Estimatedpulmonary artery systolic pressure is 43 mmHg.    ________________________________________________________________________________________  FINDINGS:     Left Ventricle:  The left ventricular cavity is normal in size. Left ventricular systolic function is normal with an ejection fraction visually estimated at 55 to 60%. There are no regional wall motion abnormalities seen. There is normal left ventricular diastolic function, with normal filling pressure. Moderate to severe left ventricular hypertrophy.     Right Ventricle:  The right ventricular cavity is normal in size, with normal wall thickness and normal systolic function.     Left Atrium:  The left atrium is severely dilated.     Right Atrium:  The right atrium is severely dilated.     Aortic Valve:  The aortic valve appears trileaflet with normal systolic excursion. There is focal calcification of the aortic valve leaflets. There is no aortic valve stenosis. There is no evidence of aortic regurgitation.     Mitral Valve:  There is moderate calcification of the mitral valve annulus. There is mild to moderate mitral regurgitation.     Tricuspid Valve:  Structurally normal tricuspid valve with normal leaflet excursion. There is mild tricuspid regurgitation. Estimated pulmonary artery systolic pressure is 43 mmHg.     Pulmonic Valve:  The pulmonic valve was not well visualized.     Systemic Veins:  The inferior vena cava is normal in size measuring 1.70 cm in diameter, (normal <2.1cm) with normal inspiratory collapse (normal >50%) consistent with normal right atrial pressure (~3, range 0-5mmHg).  ____________________________________________________________________  QUANTITATIVE DATA:  Left Ventricle Measurements: (Indexed to BSA)     IVSd (2D):   1.7 cm  LVPWd (2D):  1.4 cm  LVIDd (2D):  4.3 cm  LVIDs (2D):  3.0 cm  LV Mass:     281 g  125.9 g/m²  Visualized LV EF%: 55 to 60%     MV E Vmax:    1.07 m/s  MV A Vmax:    0.00 m/s  e' lateral:   9.68 cm/s  e' medial:    10.90 cm/s  E/e' lateral: 11.05  E/e' medial:  9.82  E/e' Average: 10.40  MV DT:        180 msec    Aorta Measurements: (Normal range) (Indexed to BSA)     Sinuses of Valsalva: 3.40 cm (3.1 - 3.7 cm)   Left Atrium Measurements: (Indexed to BSA)  LA Diam 2D: 4.00 cm  LVOT / RVOT/ Qp/Qs Data: (Indexed to BSA)  LVOT Diameter: 2.10 cm  LVOT Area:     3.46 cm²    Mitral Valve Measurements:     MV E Vmax: 1.1 m/s  MV A Vmax: 0.0 m/s    Tricuspid Valve Measurements:     TR Vmax:          3.2 m/s  TR Peak Gradient: 40.4 mmHg  RA Pressure:      3 mmHg  PASP:             43 mmHg    ________________________________________________________________________________________  Electronically signed on 6/14/2024 at 12:08:59 PM by Rahel Boss MD         *** Final ***

## 2024-06-26 NOTE — PROGRESS NOTE ADULT - ASSESSMENT
Claude Villareal is a 52 YO M with past medical history of  AF on Eliquis, indwelling Aguilera, CAD s/p stents, CVA, DMT 2, Hypertension, osteomyelitis s/p debridement, PE, transferred from [facility] for difficulty in breathing.  His recent admission was for osteomyelitis and discharged on IV Vancomycin. On ED arrival he is unresponsive, apneic, no palpable pulse or blood pressure, EKG rhythm HR < 20/min W/O P waves. Patient intubated, Code ACLS activated, IV epinephrine, IV atropine administered, pulse became palpable, still Hypotensive, IV levophed administered. Admitted to ICU consulted,  Patient woke up and agitated try to pull out ETT,  IV propofol started,

## 2024-06-26 NOTE — PROGRESS NOTE ADULT - SUBJECTIVE AND OBJECTIVE BOX
[facility] DIV[facility] of INFECTIOUS DISEASE  Juventino Whitten MD PhD, Symone Hickey MD, Anne-Marie Li MD, Jeffery Jacobs MD, Ryan Romeo MD  and providing coverage with Melody Armstrong MD  Providing Infectious Disease Consultations at [facility], [facility], [facility]'s    Office# [phone] to schedule follow up appointments  Answering Service for urgent calls or New Consults [phone]  Cell# to text for urgent issues Juventino Whitten [phone]     infectious diseases progress note:    SARAH MORRISON is a 54y y. o. Male patient    Overnight and events of the last 24hrs reviewed    Allergies    ertapenem (Blisters; Rash)  fish (Hives)    Intolerances        ANTIBIOTICS/RELEVANT:  antimicrobials  vancomycin  IVPB 750 milliGRAM(s) IV Intermittent every 12 hours    immunologic:    OTHER:  acetaminophen     Tablet .. 650 milliGRAM(s) Oral every 6 hours PRN  apixaban 5 milliGRAM(s) Oral two times a day  ascorbic acid 500 milliGRAM(s) Oral daily  aspirin enteric coated 81 milliGRAM(s) Oral daily  atorvastatin 40 milliGRAM(s) Oral at bedtime  benzocaine 20% Spray 1 Spray(s) Topical every 6 hours  calamine/zinc oxide Lotion 1 Application(s) Topical two times a day  chlorhexidine 2% Cloths 1 Application(s) Topical daily  cholecalciferol 1000 Unit(s) Oral daily  clobetasol 0.05% Cream 1 Application(s) Topical two times a day  dextrose 10% Bolus 125 milliLiter(s) IV Bolus once  dextrose 5%. 1000 milliLiter(s) IV Continuous <Continuous>  dextrose 5%. 1000 milliLiter(s) IV Continuous <Continuous>  dextrose 50% Injectable 25 Gram(s) IV Push once  dextrose 50% Injectable 12.5 Gram(s) IV Push once  diltiazem    Tablet 120 milliGRAM(s) Oral every 8 hours  diphenhydrAMINE 25 milliGRAM(s) Oral every 4 hours PRN  furosemide    Tablet 20 milliGRAM(s) Oral daily  gabapentin 100 milliGRAM(s) Oral every 12 hours  glucagon  Injectable 1 milliGRAM(s) IntraMuscular once  hydrOXYzine hydrochloride 25 milliGRAM(s) Oral two times a day  insulin lispro (ADMELOG) corrective regimen sliding scale   SubCutaneous at bedtime  insulin lispro (ADMELOG) corrective regimen sliding scale   SubCutaneous three times a day before meals  melatonin 5 milliGRAM(s) Oral at bedtime  metFORMIN 1000 milliGRAM(s) Oral two times a day  metoprolol tartrate 50 milliGRAM(s) Oral every 6 hours  metoprolol tartrate Injectable 5 milliGRAM(s) IV Push every 6 hours PRN  multivitamin 1 Tablet(s) Oral daily  naloxegol 25 milliGRAM(s) Oral daily  ondansetron Injectable 4 milliGRAM(s) IV Push every 6 hours PRN  oxybutynin XL 10 milliGRAM(s) Oral daily  pantoprazole    Tablet 40 milliGRAM(s) Oral two times a day  polyethylene glycol 3350 17 Gram(s) Oral daily  potassium chloride    Tablet ER 10 milliEquivalent(s) Oral daily  predniSONE   Tablet 10 milliGRAM(s) Oral daily  senna 2 Tablet(s) Oral at bedtime  sodium chloride 0.65% Nasal 1 Spray(s) Both Nostrils three times a day PRN  sodium chloride 0.9% lock flush 10 milliLiter(s) IV Push every 1 hour PRN  sucralfate 1 Gram(s) Oral two times a day  tiotropium 2.5 MICROgram(s)/olodaterol 2.5 MICROgram(s) (STIOLTO) Inhaler 2 Puff(s) Inhalation daily      Objective:  Vital Signs Last 24 Hrs  T(C): 37.1 (25 Jun 2024 20:44), Max: 37.1 (25 Jun 2024 20:44)  T(F): 98.8 (25 Jun 2024 20:44), Max: 98.8 (25 Jun 2024 20:44)  HR: 110 (26 Jun 2024 05:16) (87 - 110)  BP: 74/48 (26 Jun 2024 13:40) (74/48 - 126/90)  BP(mean): --  RR: 18 (26 Jun 2024 05:16) (18 - 18)  SpO2: 95% (26 Jun 2024 05:16) (93% - 95%)    Parameters below as of 25 Jun 2024 20:44  Patient On (Oxygen Delivery Method): room air        T(C): 37.1 (06-25-24 @ 20:44), Max: 37.2 (06-25-24 @ 05:00)  T(C): 37.1 (06-25-24 @ 20:44), Max: 37.2 (06-25-24 @ 05:00)  T(C): 37.1 (06-25-24 @ 20:44), Max: 37.3 (06-23-24 @ 05:00)    PHYSICAL EXAM:  HEENT: NC atraumatic  Neck: supple  Respiratory: no accessory muscle use, breathing comfortably  Cardiovascular: distant  Gastrointestinal: normal appearing, nondistended  Extremities: no clubbing, no cyanosis,        LABS:        WBC  9.99 06-24 @ 12:40  6.63 06-22 @ 14:08  7.83 06-20 @ 12:20              Creatinine: 1.20 mg/dL (06-24-24 @ 12:40)  Creatinine: 1.10 mg/dL (06-22-24 @ 14:08)  Creatinine: 1.10 mg/dL (06-20-24 @ 12:20)                INFLAMMATORY MARKERS      MICROBIOLOGY:              RADIOLOGY & ADDITIONAL STUDIES:

## 2024-06-26 NOTE — PROGRESS NOTE ADULT - ASSESSMENT
54 YO M with  AF on Eliquis, indwelling Aguilera, CAD s/p stents, CVA, DMT 2, Hypertension, osteomyelitis s/p debridement, PE, transferred from [facility] for difficulty in breathing.  His recent admission was for osteomyelitis and discharged on IV Vancomycin. Reported at [facility] he was given a dose of ertapenem and developed difficulty breathing followed by apnea. On ED arrival he was unresponsive, apneic, no palpable pulse or blood pressure, EKG rhythm HR < 20/min W/O P waves. Patient intubated,     RECOMMENDATIONS  1-Asystolic cardiac arrest, Acute hypoxic respiratory failure, Shock  - unclear etiology  -rec avoiding carbapenems and fine with just Vanco  -ICU support and monitoring    2-Osteomyeliits right calcaneous  6/4 S/p Selective debridement of wound 04-Jun-2024 10:52:57  Parviz Todd  6/4  TCx MRSA, Escherichia coli ESBL  Right calcaneus bone pathology:  -   Fragments of bone with chronic osteomyelitis.  -   Focal acute osteomyelitis cannot be ruled out.  C/w vancomycin 750 mg IV q12, goal trough 15-20, dosing per pharmacy protocol and plan had been with inability to rule out osteo rec 6 weeks so last day 7/18  PICC placed 6/7    From an ID standpoint no further requirement for inpatient status for the management of ID issues. Fine with discharge from ID standpoint when other medical issues no longer require inpatient care and social issues allow for a safe discharge plan. To schedule an outpatient ID follow up appointment please call our office at [phone]    Thank you for consulting us and involving us in the management of this most interesting and challenging case.  We will follow along in the care of this patient. Please call us at [phone] or text me directly on my cell# at [phone] with any concerns.

## 2024-06-26 NOTE — PROGRESS NOTE ADULT - SUBJECTIVE AND OBJECTIVE BOX
Patient is a 54y old  Male who presents with a chief complaint of Acute respiratory failure with hypoxia (25 Jun 2024 20:21)  nausea w emesis x 3 this am  feeling better post zofran, denies abd pain      INTERVAL HPI/OVERNIGHT EVENTS:  T(C): 37.1 (06-25-24 @ 20:44), Max: 37.1 (06-25-24 @ 20:44)  HR: 110 (06-26-24 @ 05:16) (87 - 110)  BP: 126/90 (06-26-24 @ 05:16) (103/64 - 126/90)  RR: 18 (06-26-24 @ 05:16) (18 - 18)  SpO2: 95% (06-26-24 @ 05:16) (93% - 95%)  Wt(kg): --  I&O's Summary    25 Jun 2024 07:01  -  26 Jun 2024 07:00  --------------------------------------------------------  IN: 0 mL / OUT: 2600 mL / NET: -2600 mL    26 Jun 2024 07:01  -  26 Jun 2024 11:17  --------------------------------------------------------  IN: 0 mL / OUT: 650 mL / NET: -650 mL        LABS:                        10.5   9.99  )-----------( 334      ( 24 Jun 2024 12:40 )             33.8     06-24    136  |  102  |  9   ----------------------------<  195<H>  3.8   |  25  |  1.20    Ca    8.3<L>      24 Jun 2024 12:40    TPro  6.1  /  Alb  2.0<L>  /  TBili  0.3  /  DBili  x   /  AST  13<L>  /  ALT  34  /  AlkPhos  92  06-24      Urinalysis Basic - ( 24 Jun 2024 12:40 )    Color: x / Appearance: x / SG: x / pH: x  Gluc: 195 mg/dL / Ketone: x  / Bili: x / Urobili: x   Blood: x / Protein: x / Nitrite: x   Leuk Esterase: x / RBC: x / WBC x   Sq Epi: x / Non Sq Epi: x / Bacteria: x      CAPILLARY BLOOD GLUCOSE      POCT Blood Glucose.: 292 mg/dL (26 Jun 2024 08:13)  POCT Blood Glucose.: 264 mg/dL (25 Jun 2024 21:53)  POCT Blood Glucose.: 188 mg/dL (25 Jun 2024 17:10)  POCT Blood Glucose.: 248 mg/dL (25 Jun 2024 12:23)        Urinalysis Basic - ( 24 Jun 2024 12:40 )    Color: x / Appearance: x / SG: x / pH: x  Gluc: 195 mg/dL / Ketone: x  / Bili: x / Urobili: x   Blood: x / Protein: x / Nitrite: x   Leuk Esterase: x / RBC: x / WBC x   Sq Epi: x / Non Sq Epi: x / Bacteria: x        MEDICATIONS  (STANDING):  apixaban 5 milliGRAM(s) Oral two times a day  ascorbic acid 500 milliGRAM(s) Oral daily  aspirin enteric coated 81 milliGRAM(s) Oral daily  atorvastatin 40 milliGRAM(s) Oral at bedtime  benzocaine 20% Spray 1 Spray(s) Topical every 6 hours  calamine/zinc oxide Lotion 1 Application(s) Topical two times a day  chlorhexidine 2% Cloths 1 Application(s) Topical daily  cholecalciferol 1000 Unit(s) Oral daily  clobetasol 0.05% Cream 1 Application(s) Topical two times a day  dextrose 10% Bolus 125 milliLiter(s) IV Bolus once  dextrose 5%. 1000 milliLiter(s) (50 mL/Hr) IV Continuous <Continuous>  dextrose 5%. 1000 milliLiter(s) (100 mL/Hr) IV Continuous <Continuous>  dextrose 50% Injectable 25 Gram(s) IV Push once  dextrose 50% Injectable 12.5 Gram(s) IV Push once  diltiazem    Tablet 120 milliGRAM(s) Oral every 8 hours  famotidine    Tablet 20 milliGRAM(s) Oral two times a day  furosemide    Tablet 20 milliGRAM(s) Oral daily  gabapentin 100 milliGRAM(s) Oral every 12 hours  glucagon  Injectable 1 milliGRAM(s) IntraMuscular once  hydrOXYzine hydrochloride 25 milliGRAM(s) Oral two times a day  insulin lispro (ADMELOG) corrective regimen sliding scale   SubCutaneous at bedtime  insulin lispro (ADMELOG) corrective regimen sliding scale   SubCutaneous three times a day before meals  melatonin 5 milliGRAM(s) Oral at bedtime  metFORMIN 1000 milliGRAM(s) Oral two times a day  metoprolol tartrate 50 milliGRAM(s) Oral every 6 hours  multivitamin 1 Tablet(s) Oral daily  naloxegol 25 milliGRAM(s) Oral daily  oxybutynin XL 10 milliGRAM(s) Oral daily  pantoprazole    Tablet 40 milliGRAM(s) Oral before breakfast  polyethylene glycol 3350 17 Gram(s) Oral daily  potassium chloride    Tablet ER 10 milliEquivalent(s) Oral daily  predniSONE   Tablet 10 milliGRAM(s) Oral daily  senna 2 Tablet(s) Oral at bedtime  sucralfate 1 Gram(s) Oral two times a day  tiotropium 2.5 MICROgram(s)/olodaterol 2.5 MICROgram(s) (STIOLTO) Inhaler 2 Puff(s) Inhalation daily  vancomycin  IVPB 750 milliGRAM(s) IV Intermittent every 12 hours    MEDICATIONS  (PRN):  acetaminophen     Tablet .. 650 milliGRAM(s) Oral every 6 hours PRN Mild Pain (1 - 3)  diphenhydrAMINE 25 milliGRAM(s) Oral every 4 hours PRN Rash and/or Itching  metoprolol tartrate Injectable 5 milliGRAM(s) IV Push every 6 hours PRN HR >140  ondansetron Injectable 4 milliGRAM(s) IV Push every 6 hours PRN Nausea and/or Vomiting  sodium chloride 0.65% Nasal 1 Spray(s) Both Nostrils three times a day PRN Nasal Congestion  sodium chloride 0.9% lock flush 10 milliLiter(s) IV Push every 1 hour PRN Pre/post blood products, medications, blood draw, and to maintain line patency      REVIEW OF SYSTEMS:  CONSTITUTIONAL: No fever, weight loss, or fatigue  EYES: No eye pain, visual disturbances, or discharge  ENMT:  No difficulty hearing, tinnitus, vertigo; No sinus or throat pain  NECK: No pain or stiffness  RESPIRATORY: No cough, wheezing, chills or hemoptysis; No shortness of breath  CARDIOVASCULAR: No chest pain, palpitations, dizziness, or leg swelling  GASTROINTESTINAL: No abdominal or epigastric pain. No nausea, vomiting, or hematemesis; No diarrhea or constipation. No melena or hematochezia.  GENITOURINARY: No dysuria, frequency, hematuria, or incontinence  NEUROLOGICAL: No headaches, memory loss, loss of strength, numbness, or tremors  SKIN: No itching, burning, rashes, or lesions   LYMPH NODES: No enlarged glands  ENDOCRINE: No heat or cold intolerance; No hair loss  MUSCULOSKELETAL: No joint pain or swelling; No muscle, back, or extremity pain  PSYCHIATRIC: No depression, anxiety, mood swings, or difficulty sleeping  HEME/LYMPH: No easy bruising, or bleeding gums  ALLERY AND IMMUNOLOGIC: No hives or eczema    RADIOLOGY & ADDITIONAL TESTS:    Imaging Personally Reviewed:  [ x] YES  [ ] NO    Consultant(s) Notes Reviewed:  [x ] YES  [ ] NO    PHYSICAL EXAM:  GENERAL: NAD, well-groomed, well-developed  HEAD:  Atraumatic, Normocephalic  EYES: EOMI, PERRLA, conjunctiva and sclera clear  ENMT: No tonsillar erythema, exudates, or enlargement; Moist mucous membranes, Good dentition, No lesions  NECK: Supple, No JVD, Normal thyroid  NERVOUS SYSTEM:  Alert & Oriented X3, Good concentration; Motor Strength 5/5 B/L upper and lower extremities; DTRs 2+ intact and symmetric  CHEST/LUNG: Clear to percussion bilaterally; No rales, rhonchi, wheezing, or rubs  HEART: Regular rate and rhythm; No murmurs, rubs, or gallops  ABDOMEN: Soft, Nontender, Nondistended; Bowel sounds present  EXTREMITIES:  2+ Peripheral Pulses, No clubbing, cyanosis, or edema  LYMPH: No lymphadenopathy noted  SKIN: No rashes or lesions    Care Discussed with Consultants/Other Providers [ x] YES  [ ] NO

## 2024-06-27 LAB
ANION GAP SERPL CALC-SCNC: 6 MMOL/L — SIGNIFICANT CHANGE UP (ref 5–17)
BUN SERPL-MCNC: 16 MG/DL — SIGNIFICANT CHANGE UP (ref 7–23)
CALCIUM SERPL-MCNC: 8.8 MG/DL — SIGNIFICANT CHANGE UP (ref 8.5–10.1)
CHLORIDE SERPL-SCNC: 106 MMOL/L — SIGNIFICANT CHANGE UP (ref 96–108)
CO2 SERPL-SCNC: 27 MMOL/L — SIGNIFICANT CHANGE UP (ref 22–31)
CREAT SERPL-MCNC: 1.1 MG/DL — SIGNIFICANT CHANGE UP (ref 0.5–1.3)
EGFR: 79 ML/MIN/1.73M2 — SIGNIFICANT CHANGE UP
GLUCOSE BLDC GLUCOMTR-MCNC: 146 MG/DL — HIGH (ref 70–99)
GLUCOSE BLDC GLUCOMTR-MCNC: 153 MG/DL — HIGH (ref 70–99)
GLUCOSE BLDC GLUCOMTR-MCNC: 224 MG/DL — HIGH (ref 70–99)
GLUCOSE BLDC GLUCOMTR-MCNC: 225 MG/DL — HIGH (ref 70–99)
GLUCOSE SERPL-MCNC: 256 MG/DL — HIGH (ref 70–99)
HCT VFR BLD CALC: 30.3 % — LOW (ref 39–50)
HGB BLD-MCNC: 9.7 G/DL — LOW (ref 13–17)
MCHC RBC-ENTMCNC: 30.1 PG — SIGNIFICANT CHANGE UP (ref 27–34)
MCHC RBC-ENTMCNC: 32 GM/DL — SIGNIFICANT CHANGE UP (ref 32–36)
MCV RBC AUTO: 94.1 FL — SIGNIFICANT CHANGE UP (ref 80–100)
NRBC # BLD: 0 /100 WBCS — SIGNIFICANT CHANGE UP (ref 0–0)
PLATELET # BLD AUTO: 311 K/UL — SIGNIFICANT CHANGE UP (ref 150–400)
POTASSIUM SERPL-MCNC: 3.7 MMOL/L — SIGNIFICANT CHANGE UP (ref 3.5–5.3)
POTASSIUM SERPL-SCNC: 3.7 MMOL/L — SIGNIFICANT CHANGE UP (ref 3.5–5.3)
RBC # BLD: 3.22 M/UL — LOW (ref 4.2–5.8)
RBC # FLD: 14 % — SIGNIFICANT CHANGE UP (ref 10.3–14.5)
SODIUM SERPL-SCNC: 139 MMOL/L — SIGNIFICANT CHANGE UP (ref 135–145)
VANCOMYCIN TROUGH SERPL-MCNC: 18.3 UG/ML — SIGNIFICANT CHANGE UP (ref 10–20)
WBC # BLD: 10.4 K/UL — SIGNIFICANT CHANGE UP (ref 3.8–10.5)
WBC # FLD AUTO: 10.4 K/UL — SIGNIFICANT CHANGE UP (ref 3.8–10.5)

## 2024-06-27 PROCEDURE — 99233 SBSQ HOSP IP/OBS HIGH 50: CPT

## 2024-06-27 PROCEDURE — 74018 RADEX ABDOMEN 1 VIEW: CPT | Mod: 26

## 2024-06-27 RX ORDER — OXYCODONE HYDROCHLORIDE 100 MG/5ML
10 SOLUTION ORAL ONCE
Refills: 0 | Status: DISCONTINUED | OUTPATIENT
Start: 2024-06-27 | End: 2024-06-27

## 2024-06-27 RX ORDER — METOPROLOL TARTRATE 50 MG
50 TABLET ORAL EVERY 6 HOURS
Refills: 0 | Status: DISCONTINUED | OUTPATIENT
Start: 2024-06-27 | End: 2024-06-29

## 2024-06-27 RX ORDER — PREDNISONE 10 MG/1
5 TABLET ORAL DAILY
Refills: 0 | Status: DISCONTINUED | OUTPATIENT
Start: 2024-06-27 | End: 2024-07-02

## 2024-06-27 RX ORDER — METOCLOPRAMIDE 5 MG/5ML
5 SOLUTION ORAL EVERY 12 HOURS
Refills: 0 | Status: DISCONTINUED | OUTPATIENT
Start: 2024-06-27 | End: 2024-06-27

## 2024-06-27 RX ORDER — DILTIAZEM HYDROCHLORIDE 240 MG/1
120 CAPSULE, EXTENDED RELEASE ORAL EVERY 8 HOURS
Refills: 0 | Status: DISCONTINUED | OUTPATIENT
Start: 2024-06-27 | End: 2024-07-01

## 2024-06-27 RX ADMIN — INSULIN LISPRO 4: 100 INJECTION, SOLUTION SUBCUTANEOUS at 08:47

## 2024-06-27 RX ADMIN — APIXABAN 5 MILLIGRAM(S): 5 TABLET, FILM COATED ORAL at 17:48

## 2024-06-27 RX ADMIN — PREDNISONE 10 MILLIGRAM(S): 10 TABLET ORAL at 05:45

## 2024-06-27 RX ADMIN — Medication 5 MILLIGRAM(S): at 21:32

## 2024-06-27 RX ADMIN — INSULIN LISPRO 4: 100 INJECTION, SOLUTION SUBCUTANEOUS at 12:23

## 2024-06-27 RX ADMIN — OXYCODONE HYDROCHLORIDE 10 MILLIGRAM(S): 100 SOLUTION ORAL at 12:23

## 2024-06-27 RX ADMIN — Medication 100 MILLIGRAM(S): at 17:48

## 2024-06-27 RX ADMIN — Medication 25 MILLIGRAM(S): at 22:42

## 2024-06-27 RX ADMIN — Medication 10 MILLIGRAM(S): at 12:26

## 2024-06-27 RX ADMIN — METFORMIN HYDROCHLORIDE 1000 MILLIGRAM(S): 850 TABLET, FILM COATED ORAL at 17:47

## 2024-06-27 RX ADMIN — OXYCODONE HYDROCHLORIDE 10 MILLIGRAM(S): 100 SOLUTION ORAL at 22:42

## 2024-06-27 RX ADMIN — Medication 2 TABLET(S): at 21:32

## 2024-06-27 RX ADMIN — APIXABAN 5 MILLIGRAM(S): 5 TABLET, FILM COATED ORAL at 05:44

## 2024-06-27 RX ADMIN — Medication 1 APPLICATION(S): at 12:26

## 2024-06-27 RX ADMIN — METFORMIN HYDROCHLORIDE 1000 MILLIGRAM(S): 850 TABLET, FILM COATED ORAL at 05:45

## 2024-06-27 RX ADMIN — Medication 100 MILLIGRAM(S): at 05:44

## 2024-06-27 RX ADMIN — POTASSIUM CHLORIDE 10 MILLIEQUIVALENT(S): 600 TABLET, FILM COATED, EXTENDED RELEASE ORAL at 12:22

## 2024-06-27 RX ADMIN — Medication 1 GRAM(S): at 05:45

## 2024-06-27 RX ADMIN — HYDROXYZINE PAMOATE 25 MILLIGRAM(S): 50 CAPSULE ORAL at 05:45

## 2024-06-27 RX ADMIN — Medication 1 TABLET(S): at 12:23

## 2024-06-27 RX ADMIN — FUROSEMIDE 20 MILLIGRAM(S): 10 INJECTION, SOLUTION INTRAMUSCULAR; INTRAVENOUS at 08:46

## 2024-06-27 RX ADMIN — PANTOPRAZOLE SODIUM 40 MILLIGRAM(S): 40 INJECTION, POWDER, FOR SOLUTION INTRAVENOUS at 17:47

## 2024-06-27 RX ADMIN — Medication 500 MILLIGRAM(S): at 12:22

## 2024-06-27 RX ADMIN — ASPIRIN 81 MILLIGRAM(S): 325 TABLET, FILM COATED ORAL at 12:22

## 2024-06-27 RX ADMIN — DILTIAZEM HYDROCHLORIDE 120 MILLIGRAM(S): 240 CAPSULE, EXTENDED RELEASE ORAL at 21:33

## 2024-06-27 RX ADMIN — VANCOMYCIN HYDROCHLORIDE 250 MILLIGRAM(S): KIT at 22:07

## 2024-06-27 RX ADMIN — Medication 50 MILLIGRAM(S): at 12:23

## 2024-06-27 RX ADMIN — INSULIN LISPRO 2: 100 INJECTION, SOLUTION SUBCUTANEOUS at 17:49

## 2024-06-27 RX ADMIN — PANTOPRAZOLE SODIUM 40 MILLIGRAM(S): 40 INJECTION, POWDER, FOR SOLUTION INTRAVENOUS at 05:45

## 2024-06-27 RX ADMIN — ATORVASTATIN CALCIUM 40 MILLIGRAM(S): 20 TABLET, FILM COATED ORAL at 21:32

## 2024-06-27 RX ADMIN — METOCLOPRAMIDE 5 MILLIGRAM(S): 5 SOLUTION ORAL at 17:49

## 2024-06-27 RX ADMIN — Medication 25 MILLIGRAM(S): at 12:22

## 2024-06-27 RX ADMIN — Medication 1000 UNIT(S): at 12:23

## 2024-06-27 RX ADMIN — NALOXEGOL OXALATE 25 MILLIGRAM(S): 25 TABLET, FILM COATED ORAL at 12:23

## 2024-06-27 RX ADMIN — POLYETHYLENE GLYCOL 3350 17 GRAM(S): 1 POWDER ORAL at 13:49

## 2024-06-27 RX ADMIN — HYDROXYZINE PAMOATE 25 MILLIGRAM(S): 50 CAPSULE ORAL at 17:49

## 2024-06-27 RX ADMIN — TIOTROPIUM BROMIDE AND OLODATEROL 2 PUFF(S): 3.124; 2.736 SPRAY, METERED RESPIRATORY (INHALATION) at 06:02

## 2024-06-27 RX ADMIN — VANCOMYCIN HYDROCHLORIDE 250 MILLIGRAM(S): KIT at 08:46

## 2024-06-27 RX ADMIN — Medication 50 MILLIGRAM(S): at 17:48

## 2024-06-27 RX ADMIN — OXYCODONE HYDROCHLORIDE 10 MILLIGRAM(S): 100 SOLUTION ORAL at 13:23

## 2024-06-27 RX ADMIN — DILTIAZEM HYDROCHLORIDE 120 MILLIGRAM(S): 240 CAPSULE, EXTENDED RELEASE ORAL at 13:49

## 2024-06-27 RX ADMIN — Medication 1 GRAM(S): at 17:48

## 2024-06-27 RX ADMIN — OXYCODONE HYDROCHLORIDE 10 MILLIGRAM(S): 100 SOLUTION ORAL at 23:22

## 2024-06-27 NOTE — PROGRESS NOTE ADULT - ASSESSMENT
Claude Villareal is a 54 YO M with past medical history of  AF on Eliquis, indwelling Aguilera, CAD s/p stents, CVA, DMT 2, Hypertension, osteomyelitis s/p debridement, PE, transferred from [facility] for difficulty in breathing.  His recent admission was for osteomyelitis and discharged on IV Vancomycin. On ED arrival he is unresponsive, apneic, no palpable pulse or blood pressure, EKG rhythm HR < 20/min W/O P waves. Patient intubated, Code ACLS activated, IV epinephrine, IV atropine administered, pulse became palpable, still Hypotensive, IV levophed administered. Admitted to ICU consulted,  Patient woke up and agitated try to pull out ETT,  IV propofol started,

## 2024-06-27 NOTE — PROGRESS NOTE ADULT - SUBJECTIVE AND OBJECTIVE BOX
[facility] -- Brittany Lutz, Reynaldo Sweeney Savella, , Gera Maya  Office # [phone]    Follow Up:      Subjective/Observations:     REVIEW OF SYSTEMS: All other review of systems is negative unless indicated above  PAST MEDICAL & SURGICAL HISTORY:  Diabetes      Diabetes mellitus with no complication      Afib      Hypertension      BPH (benign prostatic hyperplasia)      Perforated gastric ulcer  s/p emergent ex-lap omentopexy and plication 6/2019      Pulmonary embolism      History of non-ST elevation myocardial infarction (NSTEMI)      Osteomyelitis  s/p debridement      CAD S/P percutaneous coronary angioplasty      Cerebrovascular accident      H/O abdominal surgery      Perforated gastric ulcer      Traumatic amputation of left foot, initial encounter        MEDICATIONS  (STANDING):  apixaban 5 milliGRAM(s) Oral two times a day  ascorbic acid 500 milliGRAM(s) Oral daily  aspirin enteric coated 81 milliGRAM(s) Oral daily  atorvastatin 40 milliGRAM(s) Oral at bedtime  benzocaine 20% Spray 1 Spray(s) Topical every 6 hours  calamine/zinc oxide Lotion 1 Application(s) Topical two times a day  chlorhexidine 2% Cloths 1 Application(s) Topical daily  cholecalciferol 1000 Unit(s) Oral daily  clobetasol 0.05% Cream 1 Application(s) Topical two times a day  dextrose 10% Bolus 125 milliLiter(s) IV Bolus once  dextrose 5%. 1000 milliLiter(s) (50 mL/Hr) IV Continuous <Continuous>  dextrose 5%. 1000 milliLiter(s) (100 mL/Hr) IV Continuous <Continuous>  dextrose 50% Injectable 25 Gram(s) IV Push once  dextrose 50% Injectable 12.5 Gram(s) IV Push once  diltiazem    Tablet 120 milliGRAM(s) Oral every 8 hours  furosemide    Tablet 20 milliGRAM(s) Oral daily  gabapentin 100 milliGRAM(s) Oral every 12 hours  glucagon  Injectable 1 milliGRAM(s) IntraMuscular once  hydrOXYzine hydrochloride 25 milliGRAM(s) Oral two times a day  insulin lispro (ADMELOG) corrective regimen sliding scale   SubCutaneous at bedtime  insulin lispro (ADMELOG) corrective regimen sliding scale   SubCutaneous three times a day before meals  melatonin 5 milliGRAM(s) Oral at bedtime  metFORMIN 1000 milliGRAM(s) Oral two times a day  metoclopramide Injectable 5 milliGRAM(s) IV Push every 12 hours  metoprolol tartrate 50 milliGRAM(s) Oral every 6 hours  multivitamin 1 Tablet(s) Oral daily  naloxegol 25 milliGRAM(s) Oral daily  oxybutynin XL 10 milliGRAM(s) Oral daily  pantoprazole    Tablet 40 milliGRAM(s) Oral two times a day  polyethylene glycol 3350 17 Gram(s) Oral daily  potassium chloride    Tablet ER 10 milliEquivalent(s) Oral daily  predniSONE   Tablet 10 milliGRAM(s) Oral daily  senna 2 Tablet(s) Oral at bedtime  sucralfate 1 Gram(s) Oral two times a day  tiotropium 2.5 MICROgram(s)/olodaterol 2.5 MICROgram(s) (STIOLTO) Inhaler 2 Puff(s) Inhalation daily  vancomycin  IVPB 750 milliGRAM(s) IV Intermittent every 12 hours    MEDICATIONS  (PRN):  acetaminophen     Tablet .. 650 milliGRAM(s) Oral every 6 hours PRN Mild Pain (1 - 3)  diphenhydrAMINE 25 milliGRAM(s) Oral every 4 hours PRN Rash and/or Itching  metoprolol tartrate Injectable 5 milliGRAM(s) IV Push every 6 hours PRN HR >140  ondansetron Injectable 4 milliGRAM(s) IV Push every 6 hours PRN Nausea and/or Vomiting  oxyCODONE    IR 5 milliGRAM(s) Oral every 6 hours PRN Moderate Pain (4 - 6)  sodium chloride 0.65% Nasal 1 Spray(s) Both Nostrils three times a day PRN Nasal Congestion  sodium chloride 0.9% lock flush 10 milliLiter(s) IV Push every 1 hour PRN Pre/post blood products, medications, blood draw, and to maintain line patency    Allergies    ertapenem (Blisters; Rash)  fish (Hives)    Intolerances      Vital Signs Last 24 Hrs  T(C): 35.9 (27 Jun 2024 09:29), Max: 36.8 (26 Jun 2024 20:11)  T(F): 96.7 (27 Jun 2024 09:29), Max: 98.2 (26 Jun 2024 20:11)  HR: 113 (27 Jun 2024 13:48) (68 - 121)  BP: 105/78 (27 Jun 2024 13:48) (90/60 - 128/87)  BP(mean): --  RR: 18 (27 Jun 2024 13:48) (15 - 18)  SpO2: 97% (27 Jun 2024 13:48) (93% - 97%)    Parameters below as of 27 Jun 2024 13:48  Patient On (Oxygen Delivery Method): room air      I&O's Summary    26 Jun 2024 07:01  -  27 Jun 2024 07:00  --------------------------------------------------------  IN: 0 mL / OUT: 1050 mL / NET: -1050 mL    27 Jun 2024 07:01  -  27 Jun 2024 15:48  --------------------------------------------------------  IN: 0 mL / OUT: 2150 mL / NET: -2150 mL        PHYSICAL EXAM:  TELE:   Constitutional: NAD, awake and alert, well-developed  HEENT: Moist Mucous Membranes, Anicteric  Pulmonary: Non-labored, breath sounds are clear bilaterally, No wheezing, rales or rhonchi  Cardiovascular: Regular, S1 and S2, No murmurs, rubs, gallops or clicks  Gastrointestinal: Bowel Sounds present, soft, nontender.   Lymph: No peripheral edema. No lymphadenopathy.  Skin: No visible rashes or ulcers.  Psych:  Mood & affect appropriate  LABS: All Labs Reviewed:                        9.7    10.40 )-----------( 311      ( 27 Jun 2024 06:22 )             30.3                         10.7   11.48 )-----------( 327      ( 26 Jun 2024 18:10 )             32.5     27 Jun 2024 06:22    139    |  106    |  16     ----------------------------<  256    3.7     |  27     |  1.10   26 Jun 2024 18:10    137    |  104    |  19     ----------------------------<  357    4.2     |  25     |  1.40     Ca    8.8        27 Jun 2024 06:22  Ca    8.9        26 Jun 2024 18:10            12 Lead ECG:   Ventricular Rate 126 BPM    QRS Duration 76 ms    Q-T Interval 302 ms    QTC Calculation(Bazett) 437 ms    R Axis -12 degrees    T Axis 103 degrees    Diagnosis Line Atrial fibrillation with rapid ventricular response  Low voltage QRS  Nonspecific ST and T wave abnormality  Abnormal ECG  When compared with ECG of 14-JUN-2024 11:03,  Questionable change in QRS axis  Nonspecific T wave abnormality now evident in Inferior leads  Nonspecific T wave abnormality, worse in Anterolateral leads  Confirmed by Juventino Soto MD ([id number]) on 6/20/2024 12:52:09 PM (06-20-24 @ 11:37)      TRANSTHORACIC ECHOCARDIOGRAM REPORT  ________________________________________________________________________________                                      _______       Pt. Name:       SARAH MORRISON Study Date:    6/14/2024  MRN:            [id number]         YOB: 1970  Accession #:    [id number]        Age:           53 years  Account#:       [id number]       Gender:        M  Visit ID#  Heart Rate:                      Height:        72.05 in (183.00 cm)  Rhythm:          Weight:        222.66 lb (101.00 kg)  Blood Pressure: 88/53 mmHg       BSA/BMI:       2.23 m² / 30.16 kg/m²  ________________________________________________________________________________________  Referring Physician:    [id number] Hill Brizuela  Interpreting Physician: Rahel Boss MD  Primary Sonographer:    Mounika FELDMAN    CPT:               ECHO TTE WO CON COMP W DOPP - 46737.m  Indication(s):     Heart failure, unspecified - I50.9  Procedure:         Transthoracic echocardiogram with 2-D, M-mode and complete                     spectral and color flow Doppler.  Ordering Location: ICU1  Admission Status:  Inpatient    _______________________________________________________________________________________     CONCLUSIONS:      1. Left ventricular cavity is normal in size. Left ventricular systolic function is normal with an ejection fraction visually estimated at 55 to 60 %. There are no regional wall motion abnormalities seen.   2. Moderate to severe left ventricular hypertrophy.   3. Normal right ventricular cavity size, with normal wall thickness, and normal systolic function.   4. The left atrium is severely dilated.   5. The right atrium is severely dilated.   6. Trileaflet aortic valve with normal systolic excursion. There is focal calcification of the aortic valve leaflets.   7. Mild to moderate mitral regurgitation.   8. There is moderate calcification of the mitral valve annulus.   9. Structurally normal tricuspid valve with normal leaflet excursion.Mild tricuspid regurgitation.  10. The inferior vena cava is normal in size measuring 1.70 cm in diameter, (normal <2.1cm) with normal inspiratory collapse (normal >50%) consistent with normal right atrial pressure (~3, range 0-5mmHg).  11. Estimatedpulmonary artery systolic pressure is 43 mmHg.    ________________________________________________________________________________________  FINDINGS:     Left Ventricle:  The left ventricular cavity is normal in size. Left ventricular systolic function is normal with an ejection fraction visually estimated at 55 to 60%. There are no regional wall motion abnormalities seen. There is normal left ventricular diastolic function, with normal filling pressure. Moderate to severe left ventricular hypertrophy.     Right Ventricle:  The right ventricular cavity is normal in size, with normal wall thickness and normal systolic function.     Left Atrium:  The left atrium is severely dilated.     Right Atrium:  The right atrium is severely dilated.     Aortic Valve:  The aortic valve appears trileaflet with normal systolic excursion. There is focal calcification of the aortic valve leaflets. There is no aortic valve stenosis. There is no evidence of aortic regurgitation.     Mitral Valve:  There is moderate calcification of the mitral valve annulus. There is mild to moderate mitral regurgitation.     Tricuspid Valve:  Structurally normal tricuspid valve with normal leaflet excursion. There is mild tricuspid regurgitation. Estimated pulmonary artery systolic pressure is 43 mmHg.     Pulmonic Valve:  The pulmonic valve was not well visualized.     Systemic Veins:  The inferior vena cava is normal in size measuring 1.70 cm in diameter, (normal <2.1cm) with normal inspiratory collapse (normal >50%) consistent with normal right atrial pressure (~3, range 0-5mmHg).  ____________________________________________________________________  QUANTITATIVE DATA:  Left Ventricle Measurements: (Indexed to BSA)     IVSd (2D):   1.7 cm  LVPWd (2D):  1.4 cm  LVIDd (2D):  4.3 cm  LVIDs (2D):  3.0 cm  LV Mass:     281 g  125.9 g/m²  Visualized LV EF%: 55 to 60%     MV E Vmax:    1.07 m/s  MV A Vmax:    0.00 m/s  e' lateral:   9.68 cm/s  e' medial:    10.90 cm/s  E/e' lateral: 11.05  E/e' medial:  9.82  E/e' Average: 10.40  MV DT:        180 msec    Aorta Measurements: (Normal range) (Indexed to BSA)     Sinuses of Valsalva: 3.40 cm (3.1 - 3.7 cm)       Left Atrium Measurements: (Indexed to BSA)  LA Diam 2D: 4.00 cm       LVOT / RVOT/ Qp/Qs Data: (Indexed to BSA)  LVOT Diameter: 2.10 cm  LVOT Area:     3.46 cm²    Mitral Valve Measurements:     MV E Vmax: 1.1 m/s  MV A Vmax: 0.0 m/s       Tricuspid Valve Measurements:     TR Vmax:          3.2 m/s  TR Peak Gradient: 40.4 mmHg  RA Pressure:      3 mmHg  PASP:             43 mmHg_____________________________________________________________________________________  Electronically signed on 6/14/2024 at 12:08:59 PM by Rahel Boss MD    *** Final ***      [facility] -- Brittany Lutz, Reynaldo Sweeney Savella, , Gera Maya  Office # [phone]    Follow Up:  s/p Cardiac Arrest, Afib with SVR, Diastolic HF    Subjective/Observations: Denies dizziness or lightheadedness.  Denies  or palpitations.  Denies SOB, DUBOIS or orthopnea.  Remains comfortable on RA.  c/o pruritus    REVIEW OF SYSTEMS: All other review of systems is negative unless indicated above  PAST MEDICAL & SURGICAL HISTORY:  Diabetes      Diabetes mellitus with no complication      Afib      Hypertension      BPH (benign prostatic hyperplasia)      Perforated gastric ulcer  s/p emergent ex-lap omentopexy and plication 6/2019      Pulmonary embolism      History of non-ST elevation myocardial infarction (NSTEMI)      Osteomyelitis  s/p debridement      CAD S/P percutaneous coronary angioplasty      Cerebrovascular accident      H/O abdominal surgery      Perforated gastric ulcer      Traumatic amputation of left foot, initial encounter    MEDICATIONS  (STANDING):  apixaban 5 milliGRAM(s) Oral two times a day  ascorbic acid 500 milliGRAM(s) Oral daily  aspirin enteric coated 81 milliGRAM(s) Oral daily  atorvastatin 40 milliGRAM(s) Oral at bedtime  benzocaine 20% Spray 1 Spray(s) Topical every 6 hours  calamine/zinc oxide Lotion 1 Application(s) Topical two times a day  chlorhexidine 2% Cloths 1 Application(s) Topical daily  cholecalciferol 1000 Unit(s) Oral daily  clobetasol 0.05% Cream 1 Application(s) Topical two times a day  dextrose 10% Bolus 125 milliLiter(s) IV Bolus once  dextrose 5%. 1000 milliLiter(s) (50 mL/Hr) IV Continuous <Continuous>  dextrose 5%. 1000 milliLiter(s) (100 mL/Hr) IV Continuous <Continuous>  dextrose 50% Injectable 25 Gram(s) IV Push once  dextrose 50% Injectable 12.5 Gram(s) IV Push once  diltiazem    Tablet 120 milliGRAM(s) Oral every 8 hours  furosemide    Tablet 20 milliGRAM(s) Oral daily  gabapentin 100 milliGRAM(s) Oral every 12 hours  glucagon  Injectable 1 milliGRAM(s) IntraMuscular once  hydrOXYzine hydrochloride 25 milliGRAM(s) Oral two times a day  insulin lispro (ADMELOG) corrective regimen sliding scale   SubCutaneous at bedtime  insulin lispro (ADMELOG) corrective regimen sliding scale   SubCutaneous three times a day before meals  melatonin 5 milliGRAM(s) Oral at bedtime  metFORMIN 1000 milliGRAM(s) Oral two times a day  metoclopramide Injectable 5 milliGRAM(s) IV Push every 12 hours  metoprolol tartrate 50 milliGRAM(s) Oral every 6 hours  multivitamin 1 Tablet(s) Oral daily  naloxegol 25 milliGRAM(s) Oral daily  oxybutynin XL 10 milliGRAM(s) Oral daily  pantoprazole    Tablet 40 milliGRAM(s) Oral two times a day  polyethylene glycol 3350 17 Gram(s) Oral daily  potassium chloride    Tablet ER 10 milliEquivalent(s) Oral daily  predniSONE   Tablet 10 milliGRAM(s) Oral daily  senna 2 Tablet(s) Oral at bedtime  sucralfate 1 Gram(s) Oral two times a day  tiotropium 2.5 MICROgram(s)/olodaterol 2.5 MICROgram(s) (STIOLTO) Inhaler 2 Puff(s) Inhalation daily  vancomycin  IVPB 750 milliGRAM(s) IV Intermittent every 12 hours    MEDICATIONS  (PRN):  acetaminophen     Tablet .. 650 milliGRAM(s) Oral every 6 hours PRN Mild Pain (1 - 3)  diphenhydrAMINE 25 milliGRAM(s) Oral every 4 hours PRN Rash and/or Itching  metoprolol tartrate Injectable 5 milliGRAM(s) IV Push every 6 hours PRN HR >140  ondansetron Injectable 4 milliGRAM(s) IV Push every 6 hours PRN Nausea and/or Vomiting  oxyCODONE    IR 5 milliGRAM(s) Oral every 6 hours PRN Moderate Pain (4 - 6)  sodium chloride 0.65% Nasal 1 Spray(s) Both Nostrils three times a day PRN Nasal Congestion  sodium chloride 0.9% lock flush 10 milliLiter(s) IV Push every 1 hour PRN Pre/post blood products, medications, blood draw, and to maintain line patency    Allergies    ertapenem (Blisters; Rash)  fish (Hives)    Intolerances    Vital Signs Last 24 Hrs  T(C): 35.9 (27 Jun 2024 09:29), Max: 36.8 (26 Jun 2024 20:11)  T(F): 96.7 (27 Jun 2024 09:29), Max: 98.2 (26 Jun 2024 20:11)  HR: 113 (27 Jun 2024 13:48) (68 - 121)  BP: 105/78 (27 Jun 2024 13:48) (90/60 - 128/87)  BP(mean): --  RR: 18 (27 Jun 2024 13:48) (15 - 18)  SpO2: 97% (27 Jun 2024 13:48) (93% - 97%)    Parameters below as of 27 Jun 2024 13:48  Patient On (Oxygen Delivery Method): room air      I&O's Summary    26 Jun 2024 07:01  -  27 Jun 2024 07:00  --------------------------------------------------------  IN: 0 mL / OUT: 1050 mL / NET: -1050 mL    27 Jun 2024 07:01  -  27 Jun 2024 15:48  --------------------------------------------------------  IN: 0 mL / OUT: 2150 mL / NET: -2150 mL    PHYSICAL EXAM:  TELE: Not on tele  Constitutional: NAD, awake and alert  HEENT: Moist Mucous Membranes, Anicteric  Pulmonary: Non-labored, breath sounds are clear but diminished bilaterally, No wheezing, rales or rhonchi  Cardiovascular: IRRR, S1 and S2, +murmurs, no rubs, gallops or clicks  Gastrointestinal: Bowel Sounds present, soft, nontender.   Lymph: No peripheral edema. No lymphadenopathy.  Skin: Generalized rashes.  +skin breakdown on sacrum  Psych:  Mood & affect appropriate  LABS: All Labs Reviewed:                        9.7    10.40 )-----------( 311      ( 27 Jun 2024 06:22 )             30.3                         10.7   11.48 )-----------( 327      ( 26 Jun 2024 18:10 )             32.5     27 Jun 2024 06:22    139    |  106    |  16     ----------------------------<  256    3.7     |  27     |  1.10   26 Jun 2024 18:10    137    |  104    |  19     ----------------------------<  357    4.2     |  25     |  1.40     Ca    8.8        27 Jun 2024 06:22  Ca    8.9        26 Jun 2024 18:10            12 Lead ECG:   Ventricular Rate 126 BPM    QRS Duration 76 ms    Q-T Interval 302 ms    QTC Calculation(Bazett) 437 ms    R Axis -12 degrees    T Axis 103 degrees    Diagnosis Line Atrial fibrillation with rapid ventricular response  Low voltage QRS  Nonspecific ST and T wave abnormality  Abnormal ECG  When compared with ECG of 14-JUN-2024 11:03,  Questionable change in QRS axis  Nonspecific T wave abnormality now evident in Inferior leads  Nonspecific T wave abnormality, worse in Anterolateral leads  Confirmed by Juventino Soto MD ([id number]) on 6/20/2024 12:52:09 PM (06-20-24 @ 11:37)      TRANSTHORACIC ECHOCARDIOGRAM REPORT  ________________________________________________________________________________                                      _______       Pt. Name:       SARAH MORRISON Study Date:    6/14/2024  MRN:            [id number]         YOB: 1970  Accession #:    [id number]        Age:           53 years  Account#:       [id number]       Gender:        M  Visit ID#  Heart Rate:                      Height:        72.05 in (183.00 cm)  Rhythm:          Weight:        222.66 lb (101.00 kg)  Blood Pressure: 88/53 mmHg       BSA/BMI:       2.23 m² / 30.16 kg/m²  ________________________________________________________________________________________  Referring Physician:    [id number] Hill Brizuela  Interpreting Physician: Rahel Boss MD  Primary Sonographer:    Mounika FELDMAN    CPT:               ECHO TTE WO CON COMP W DOPP - 70955.m  Indication(s):     Heart failure, unspecified - I50.9  Procedure:         Transthoracic echocardiogram with 2-D, M-mode and complete                     spectral and color flow Doppler.  Ordering Location: ICU1  Admission Status:  Inpatient    _______________________________________________________________________________________     CONCLUSIONS:      1. Left ventricular cavity is normal in size. Left ventricular systolic function is normal with an ejection fraction visually estimated at 55 to 60 %. There are no regional wall motion abnormalities seen.   2. Moderate to severe left ventricular hypertrophy.   3. Normal right ventricular cavity size, with normal wall thickness, and normal systolic function.   4. The left atrium is severely dilated.   5. The right atrium is severely dilated.   6. Trileaflet aortic valve with normal systolic excursion. There is focal calcification of the aortic valve leaflets.   7. Mild to moderate mitral regurgitation.   8. There is moderate calcification of the mitral valve annulus.   9. Structurally normal tricuspid valve with normal leaflet excursion.Mild tricuspid regurgitation.  10. The inferior vena cava is normal in size measuring 1.70 cm in diameter, (normal <2.1cm) with normal inspiratory collapse (normal >50%) consistent with normal right atrial pressure (~3, range 0-5mmHg).  11. Estimatedpulmonary artery systolic pressure is 43 mmHg.    ________________________________________________________________________________________  FINDINGS:     Left Ventricle:  The left ventricular cavity is normal in size. Left ventricular systolic function is normal with an ejection fraction visually estimated at 55 to 60%. There are no regional wall motion abnormalities seen. There is normal left ventricular diastolic function, with normal filling pressure. Moderate to severe left ventricular hypertrophy.     Right Ventricle:  The right ventricular cavity is normal in size, with normal wall thickness and normal systolic function.     Left Atrium:  The left atrium is severely dilated.     Right Atrium:  The right atrium is severely dilated.     Aortic Valve:  The aortic valve appears trileaflet with normal systolic excursion. There is focal calcification of the aortic valve leaflets. There is no aortic valve stenosis. There is no evidence of aortic regurgitation.     Mitral Valve:  There is moderate calcification of the mitral valve annulus. There is mild to moderate mitral regurgitation.     Tricuspid Valve:  Structurally normal tricuspid valve with normal leaflet excursion. There is mild tricuspid regurgitation. Estimated pulmonary artery systolic pressure is 43 mmHg.     Pulmonic Valve:  The pulmonic valve was not well visualized.     Systemic Veins:  The inferior vena cava is normal in size measuring 1.70 cm in diameter, (normal <2.1cm) with normal inspiratory collapse (normal >50%) consistent with normal right atrial pressure (~3, range 0-5mmHg).  ____________________________________________________________________  QUANTITATIVE DATA:  Left Ventricle Measurements: (Indexed to BSA)     IVSd (2D):   1.7 cm  LVPWd (2D):  1.4 cm  LVIDd (2D):  4.3 cm  LVIDs (2D):  3.0 cm  LV Mass:     281 g  125.9 g/m²  Visualized LV EF%: 55 to 60%     MV E Vmax:    1.07 m/s  MV A Vmax:    0.00 m/s  e' lateral:   9.68 cm/s  e' medial:    10.90 cm/s  E/e' lateral: 11.05  E/e' medial:  9.82  E/e' Average: 10.40  MV DT:        180 msec    Aorta Measurements: (Normal range) (Indexed to BSA)     Sinuses of Valsalva: 3.40 cm (3.1 - 3.7 cm)       Left Atrium Measurements: (Indexed to BSA)  LA Diam 2D: 4.00 cm       LVOT / RVOT/ Qp/Qs Data: (Indexed to BSA)  LVOT Diameter: 2.10 cm  LVOT Area:     3.46 cm²    Mitral Valve Measurements:     MV E Vmax: 1.1 m/s  MV A Vmax: 0.0 m/s       Tricuspid Valve Measurements:     TR Vmax:          3.2 m/s  TR Peak Gradient: 40.4 mmHg  RA Pressure:      3 mmHg  PASP:             43 mmHg_____________________________________________________________________________________  Electronically signed on 6/14/2024 at 12:08:59 PM by Rahel Boss MD    *** Final ***

## 2024-06-27 NOTE — PROGRESS NOTE ADULT - SUBJECTIVE AND OBJECTIVE BOX
Date of Service 06-27-24 @ 20:23    Patient is a 54y old  Male who presents with a chief complaint of Acute respiratory failure with hypoxia (27 Jun 2024 15:48)      INTERVAL /OVERNIGHT EVENTS: now with nausea and vomiting    MEDICATIONS  (STANDING):  apixaban 5 milliGRAM(s) Oral two times a day  ascorbic acid 500 milliGRAM(s) Oral daily  aspirin enteric coated 81 milliGRAM(s) Oral daily  atorvastatin 40 milliGRAM(s) Oral at bedtime  benzocaine 20% Spray 1 Spray(s) Topical every 6 hours  calamine/zinc oxide Lotion 1 Application(s) Topical two times a day  chlorhexidine 2% Cloths 1 Application(s) Topical daily  cholecalciferol 1000 Unit(s) Oral daily  clobetasol 0.05% Cream 1 Application(s) Topical two times a day  dextrose 10% Bolus 125 milliLiter(s) IV Bolus once  dextrose 5%. 1000 milliLiter(s) (50 mL/Hr) IV Continuous <Continuous>  dextrose 5%. 1000 milliLiter(s) (100 mL/Hr) IV Continuous <Continuous>  dextrose 50% Injectable 25 Gram(s) IV Push once  dextrose 50% Injectable 12.5 Gram(s) IV Push once  diltiazem    Tablet 120 milliGRAM(s) Oral every 8 hours  gabapentin 100 milliGRAM(s) Oral every 12 hours  glucagon  Injectable 1 milliGRAM(s) IntraMuscular once  hydrOXYzine hydrochloride 25 milliGRAM(s) Oral two times a day  insulin lispro (ADMELOG) corrective regimen sliding scale   SubCutaneous three times a day before meals  insulin lispro (ADMELOG) corrective regimen sliding scale   SubCutaneous at bedtime  melatonin 5 milliGRAM(s) Oral at bedtime  metFORMIN 1000 milliGRAM(s) Oral two times a day  metoclopramide Injectable 5 milliGRAM(s) IV Push every 12 hours  metoprolol tartrate 50 milliGRAM(s) Oral every 6 hours  multivitamin 1 Tablet(s) Oral daily  naloxegol 25 milliGRAM(s) Oral daily  oxybutynin XL 10 milliGRAM(s) Oral daily  pantoprazole    Tablet 40 milliGRAM(s) Oral two times a day  polyethylene glycol 3350 17 Gram(s) Oral daily  potassium chloride    Tablet ER 10 milliEquivalent(s) Oral daily  predniSONE   Tablet 10 milliGRAM(s) Oral daily  senna 2 Tablet(s) Oral at bedtime  sucralfate 1 Gram(s) Oral two times a day  tiotropium 2.5 MICROgram(s)/olodaterol 2.5 MICROgram(s) (STIOLTO) Inhaler 2 Puff(s) Inhalation daily  vancomycin  IVPB 750 milliGRAM(s) IV Intermittent every 12 hours    MEDICATIONS  (PRN):  acetaminophen     Tablet .. 650 milliGRAM(s) Oral every 6 hours PRN Mild Pain (1 - 3)  diphenhydrAMINE 25 milliGRAM(s) Oral every 4 hours PRN Rash and/or Itching  metoprolol tartrate Injectable 5 milliGRAM(s) IV Push every 6 hours PRN HR >140  ondansetron Injectable 4 milliGRAM(s) IV Push every 6 hours PRN Nausea and/or Vomiting  oxyCODONE    IR 5 milliGRAM(s) Oral every 6 hours PRN Moderate Pain (4 - 6)  sodium chloride 0.65% Nasal 1 Spray(s) Both Nostrils three times a day PRN Nasal Congestion  sodium chloride 0.9% lock flush 10 milliLiter(s) IV Push every 1 hour PRN Pre/post blood products, medications, blood draw, and to maintain line patency      Allergies    ertapenem (Blisters; Rash)  fish (Hives)    Intolerances        REVIEW OF SYSTEMS:  CONSTITUTIONAL: No fever, weight loss, or fatigue  EYES: No eye pain, visual disturbances, or discharge  ENMT:  No difficulty hearing, tinnitus, vertigo; No sinus or throat pain  NECK: No pain or stiffness  RESPIRATORY: No cough, wheezing, chills or hemoptysis; No shortness of breath  CARDIOVASCULAR: No chest pain, palpitations, dizziness, or leg swelling  GASTROINTESTINAL: No abdominal or epigastric pain. No nausea, vomiting, or hematemesis; No diarrhea or constipation. No melena or hematochezia.  GENITOURINARY: No dysuria, frequency, hematuria, or incontinence  NEUROLOGICAL: No headaches, memory loss, loss of strength, numbness, or tremors  SKIN: + itching, burning, rashes,    LYMPH NODES: No enlarged glands  ENDOCRINE: No heat or cold intolerance; No hair loss; No polydipsia or polyuria  MUSCULOSKELETAL: No joint pain or swelling; No muscle, back, or extremity pain  PSYCHIATRIC: No depression, anxiety, mood swings, or difficulty sleeping  HEME/LYMPH: No easy bruising, or bleeding gums  ALLERGY AND IMMUNOLOGIC: No hives or eczema    Vital Signs Last 24 Hrs  T(C): 35.9 (27 Jun 2024 09:29), Max: 36.6 (27 Jun 2024 06:32)  T(F): 96.7 (27 Jun 2024 09:29), Max: 97.8 (27 Jun 2024 06:32)  HR: 95 (27 Jun 2024 17:46) (92 - 121)  BP: 101/82 (27 Jun 2024 17:46) (90/60 - 128/87)  BP(mean): --  RR: 18 (27 Jun 2024 13:48) (15 - 18)  SpO2: 97% (27 Jun 2024 13:48) (94% - 97%)    Parameters below as of 27 Jun 2024 13:48  Patient On (Oxygen Delivery Method): room air        PHYSICAL EXAM:  GENERAL: NAD, well-groomed, well-developed  HEAD:  Atraumatic, Normocephalic  EYES: EOMI, PERRLA, conjunctiva and sclera clear  ENMT: No tonsillar erythema, exudates, or enlargement; Moist mucous membranes, Good dentition, No lesions  NECK: Supple, No JVD, Normal thyroid  NERVOUS SYSTEM:  Alert & Oriented X3, Good concentration; Motor Strength 5/5 B/L upper and lower extremities; DTRs 2+ intact and symmetric  CHEST/LUNG: Clear to auscultation bilaterally; No rales, rhonchi, wheezing, or rubs  HEART: Regular rate and rhythm; No murmurs, rubs, or gallops  ABDOMEN: Soft, Nontender, Nondistended; Bowel sounds present  EXTREMITIES:  2+ Peripheral Pulses, No clubbing, cyanosis, or edema  LYMPH: No lymphadenopathy noted  SKIN: No rashes or lesions    LABS:                        9.7    10.40 )-----------( 311      ( 27 Jun 2024 06:22 )             30.3     27 Jun 2024 06:22    139    |  106    |  16     ----------------------------<  256    3.7     |  27     |  1.10     Ca    8.8        27 Jun 2024 06:22        Urinalysis Basic - ( 27 Jun 2024 06:22 )    Color: x / Appearance: x / SG: x / pH: x  Gluc: 256 mg/dL / Ketone: x  / Bili: x / Urobili: x   Blood: x / Protein: x / Nitrite: x   Leuk Esterase: x / RBC: x / WBC x   Sq Epi: x / Non Sq Epi: x / Bacteria: x      CAPILLARY BLOOD GLUCOSE      POCT Blood Glucose.: 153 mg/dL (27 Jun 2024 17:19)  POCT Blood Glucose.: 224 mg/dL (27 Jun 2024 11:56)  POCT Blood Glucose.: 225 mg/dL (27 Jun 2024 08:17)  POCT Blood Glucose.: 314 mg/dL (26 Jun 2024 21:11)      RADIOLOGY & ADDITIONAL TESTS:    Notes Reviewed:  [x ] YES  [ ] NO    Care Discussed with Consultants/Other Providers [x ] YES  [ ] NO

## 2024-06-27 NOTE — PROGRESS NOTE ADULT - ASSESSMENT
54 YO M with  AF on Eliquis, indwelling Aguilera, CAD s/p stents, CVA, DMT 2, Hypertension, osteomyelitis s/p debridement, PE, transferred from [facility] for difficulty in breathing.  His recent admission was for osteomyelitis and discharged on IV Vancomycin. Reported at [facility] he was given a dose of ertapenem and developed difficulty breathing followed by apnea. On ED arrival he was unresponsive, apneic, no palpable pulse or blood pressure, EKG rhythm HR < 20/min W/O P waves. Patient intubated,     RECOMMENDATIONS  1-Asystolic cardiac arrest, Acute hypoxic respiratory failure, Shock  - unclear etiology  -rec avoiding carbapenems and fine with just Vanco  -ICU support and monitoring    2-Osteomyeliits right calcaneous  6/4 S/p Selective debridement of wound 04-Jun-2024 10:52:57  Parviz Todd  6/4  TCx MRSA, Escherichia coli ESBL  Right calcaneus bone pathology:  -   Fragments of bone with chronic osteomyelitis.  -   Focal acute osteomyelitis cannot be ruled out.  C/w vancomycin 750 mg IV q12, dosing per pharmacy protocol and plan had been with inability to rule out osteo rec 6 weeks so last day 7/18  PICC placed 6/7    From an ID standpoint no further requirement for inpatient status for the management of ID issues. Fine with discharge from ID standpoint when other medical issues no longer require inpatient care and social issues allow for a safe discharge plan. To schedule an outpatient ID follow up appointment please call our office at [phone]    Thank you for consulting us and involving us in the management of this most interesting and challenging case.  We will follow along in the care of this patient. Please call us at [phone] or text me directly on my cell# at [phone] with any concerns.

## 2024-06-27 NOTE — PROGRESS NOTE ADULT - ASSESSMENT
52 YO M with past medical history of AFib (on Eliquis), indwelling Aguilera, cerebral aneurysm, CAD (s/p stents), CVA, T2DM, HTN, OM (s/p debridement), PE, perforated gastric ulcer s/p ometnopexy 2019 with Dr. Mejia, transferred from [facility] for difficulty in breathing.      CAD, Afib, s/p Cardiac Arrest, Mod-Severe MR, HTN  - s/p cardiac arrest x2 w/ AHRF.  s/p intubation with successful extubation.  On pressors while in ICU  - Now tolerating RA    - Trops + but no sig trend. No clear evidence of acute ischemia  - Continue ASA and statin      - Known AFib with rapid rates at times due to missed doses today in the setting of soft BP  - Continue Cardizem and BB with more lenient parameter  - Continue Eliquis    - TTE normal EF and mod-severe MR  - No sign of volume overload on exam  - Hold Lasix 20mg po qd for now to allow room for AVN blockers (ordered)    - ATN with creat normalized  - With diffuse rashes +pruritus.  Recommend Derma.  ID following  - Monitor and replete lytes, keep K>4, Mg>2.  - Will continue to follow.    Henrietta Kim DNP, NP-C, AGACNP-C  Cardiology   Call TEAMS

## 2024-06-27 NOTE — PHARMACOTHERAPY INTERVENTION NOTE - COMMENTS
Vancomycin Dosing Per Pharmacy:  Patient is a 54 yo male being treated for R heel wound/osteomyelitis, currently ordered for IV vancomycin. Dose reduced on 6/24 to 750mg Q12H.     1. Indication for the vancomycin: R heel wound/osteomyelitis  2. Requesting Provider: Dr. Whitten  3. Current plan and dosing regimen: 750mg Q12   4. Day of therapy: 23  5. Cultures: Tissue Cx 5/26/24: E faecalis + MRSA. Tissue Cx 6/4/24: ESBL E coli + E faecalis. R heel surgical swab 6/14/24: MRSA + citrobacter freundii + pseudomonas stutzeri.  6. Target trough or AUC/BAYLEE: 400-600  7. Day and time level is due: 6/26 08:00  8. Previous levels: 6/4 (19:31): 22, 6/5 (05:47): 14, 6/6 (20:20): 14, 6/9 (04:58): 14.4, 6/11 (04:30): 15.7, 6/13 (05:54): 17.3, 6/14 (01:25): 15.2, 6/14 (04:30): 16.1, 6/15 (5:40): 17.5, 6/16 (5:38): 15.3, 6/17 (05:52): 13.2, 6/18 (05:50): 11.8, 6/19 (06:15): 21.3, 6/20 (08:52): 21.6, 6/21 (09:30): 37.5, 06/22 (0945): 11.1, 06/23 (09:30): 15.9, 06/24 (08:00): 19.6  9. Expected 24-hour AUC: 486 Vancomycin Dosing Per Pharmacy:  Patient is a 54 yo male being treated for R heel wound/osteomyelitis, currently ordered for IV vancomycin. Dose reduced on 6/24 to 750mg Q12H.     1. Indication for the vancomycin: R heel wound/osteomyelitis  2. Requesting Provider: Dr. Whitten  3. Current plan and dosing regimen: 750mg Q12   4. Day of therapy: 23  5. Cultures: Tissue Cx 5/26/24: E faecalis + MRSA. Tissue Cx 6/4/24: ESBL E coli + E faecalis. R heel surgical swab 6/14/24: MRSA + citrobacter freundii + pseudomonas stutzeri.  6. Target trough or AUC/BAYLEE: 400-600  7. Day and time level is due: 6/28 08:00  8. Previous levels: 6/4 (19:31): 22, 6/5 (05:47): 14, 6/6 (20:20): 14, 6/9 (04:58): 14.4, 6/11 (04:30): 15.7, 6/13 (05:54): 17.3, 6/14 (01:25): 15.2, 6/14 (04:30): 16.1, 6/15 (5:40): 17.5, 6/16 (5:38): 15.3, 6/17 (05:52): 13.2, 6/18 (05:50): 11.8, 6/19 (06:15): 21.3, 6/20 (08:52): 21.6, 6/21 (09:30): 37.5, 06/22 (0945): 11.1, 06/23 (09:30): 15.9, 06/24 (08:00): 19.6  9. Expected 24-hour AUC: 486

## 2024-06-27 NOTE — PROGRESS NOTE ADULT - SUBJECTIVE AND OBJECTIVE BOX
[facility] DIVISION of INFECTIOUS DISEASE  Juventino Whitten MD PhD, Symone Hickey MD, Anne-Marie Li MD, Jeffery Jacobs MD, Ryan Romeo MD  and providing coverage with Melody Armstrong MD  Providing Infectious Disease Consultations at [facility], [facility], [facility]'s    Office# [phone] to schedule follow up appointments  Answering Service for urgent calls or New Consults [phone]  Cell# to text for urgent issues Juventino Whitten [phone]     infectious diseases progress note:    SARAH MORRISON is a 54y y. o. Male patient    Overnight and events of the last 24hrs reviewed    Allergies    ertapenem (Blisters; Rash)  fish (Hives)    Intolerances        ANTIBIOTICS/RELEVANT:  antimicrobials  vancomycin  IVPB 750 milliGRAM(s) IV Intermittent every 12 hours    immunologic:    OTHER:  acetaminophen     Tablet .. 650 milliGRAM(s) Oral every 6 hours PRN  apixaban 5 milliGRAM(s) Oral two times a day  ascorbic acid 500 milliGRAM(s) Oral daily  aspirin enteric coated 81 milliGRAM(s) Oral daily  atorvastatin 40 milliGRAM(s) Oral at bedtime  benzocaine 20% Spray 1 Spray(s) Topical every 6 hours  calamine/zinc oxide Lotion 1 Application(s) Topical two times a day  chlorhexidine 2% Cloths 1 Application(s) Topical daily  cholecalciferol 1000 Unit(s) Oral daily  clobetasol 0.05% Cream 1 Application(s) Topical two times a day  dextrose 10% Bolus 125 milliLiter(s) IV Bolus once  dextrose 5%. 1000 milliLiter(s) IV Continuous <Continuous>  dextrose 5%. 1000 milliLiter(s) IV Continuous <Continuous>  dextrose 50% Injectable 25 Gram(s) IV Push once  dextrose 50% Injectable 12.5 Gram(s) IV Push once  diltiazem    Tablet 120 milliGRAM(s) Oral every 8 hours  diphenhydrAMINE 25 milliGRAM(s) Oral every 4 hours PRN  furosemide    Tablet 20 milliGRAM(s) Oral daily  gabapentin 100 milliGRAM(s) Oral every 12 hours  glucagon  Injectable 1 milliGRAM(s) IntraMuscular once  hydrOXYzine hydrochloride 25 milliGRAM(s) Oral two times a day  insulin lispro (ADMELOG) corrective regimen sliding scale   SubCutaneous at bedtime  insulin lispro (ADMELOG) corrective regimen sliding scale   SubCutaneous three times a day before meals  melatonin 5 milliGRAM(s) Oral at bedtime  metFORMIN 1000 milliGRAM(s) Oral two times a day  metoprolol tartrate 50 milliGRAM(s) Oral every 6 hours  metoprolol tartrate Injectable 5 milliGRAM(s) IV Push every 6 hours PRN  multivitamin 1 Tablet(s) Oral daily  naloxegol 25 milliGRAM(s) Oral daily  ondansetron Injectable 4 milliGRAM(s) IV Push every 6 hours PRN  oxybutynin XL 10 milliGRAM(s) Oral daily  oxyCODONE    IR 10 milliGRAM(s) Oral every 6 hours PRN  oxyCODONE    IR 5 milliGRAM(s) Oral every 6 hours PRN  pantoprazole    Tablet 40 milliGRAM(s) Oral two times a day  polyethylene glycol 3350 17 Gram(s) Oral daily  potassium chloride    Tablet ER 10 milliEquivalent(s) Oral daily  predniSONE   Tablet 10 milliGRAM(s) Oral daily  senna 2 Tablet(s) Oral at bedtime  sodium chloride 0.65% Nasal 1 Spray(s) Both Nostrils three times a day PRN  sodium chloride 0.9% lock flush 10 milliLiter(s) IV Push every 1 hour PRN  sucralfate 1 Gram(s) Oral two times a day  tiotropium 2.5 MICROgram(s)/olodaterol 2.5 MICROgram(s) (STIOLTO) Inhaler 2 Puff(s) Inhalation daily      Objective:  Vital Signs Last 24 Hrs  T(C): 35.9 (27 Jun 2024 09:29), Max: 36.8 (26 Jun 2024 20:11)  T(F): 96.7 (27 Jun 2024 09:29), Max: 98.2 (26 Jun 2024 20:11)  HR: 121 (27 Jun 2024 08:34) (68 - 121)  BP: 110/72 (27 Jun 2024 08:34) (74/48 - 110/72)  BP(mean): --  RR: 15 (27 Jun 2024 08:34) (15 - 18)  SpO2: 94% (27 Jun 2024 08:34) (93% - 94%)    Parameters below as of 27 Jun 2024 08:34  Patient On (Oxygen Delivery Method): room air        T(C): 35.9 (06-27-24 @ 09:29), Max: 37.1 (06-25-24 @ 20:44)  T(C): 35.9 (06-27-24 @ 09:29), Max: 37.2 (06-25-24 @ 05:00)  T(C): 35.9 (06-27-24 @ 09:29), Max: 37.3 (06-23-24 @ 12:04)    PHYSICAL EXAM:  HEENT: NC atraumatic  Neck: supple  Respiratory: no accessory muscle use, breathing comfortably  Cardiovascular: distant  Gastrointestinal: normal appearing, nondistended  Extremities: no clubbing, no cyanosis,        LABS:                          9.7    10.40 )-----------( 311      ( 27 Jun 2024 06:22 )             30.3       WBC  10.40 06-27 @ 06:22  11.48 06-26 @ 18:10  9.99 06-24 @ 12:40  6.63 06-22 @ 14:08  7.83 06-20 @ 12:20      06-27    139  |  106  |  16  ----------------------------<  256<H>  3.7   |  27  |  1.10    Ca    8.8      27 Jun 2024 06:22        Creatinine: 1.10 mg/dL (06-27-24 @ 06:22)  Creatinine: 1.40 mg/dL (06-26-24 @ 18:10)  Creatinine: 1.20 mg/dL (06-24-24 @ 12:40)  Creatinine: 1.10 mg/dL (06-22-24 @ 14:08)  Creatinine: 1.10 mg/dL (06-20-24 @ 12:20)        Urinalysis Basic - ( 27 Jun 2024 06:22 )    Color: x / Appearance: x / SG: x / pH: x  Gluc: 256 mg/dL / Ketone: x  / Bili: x / Urobili: x   Blood: x / Protein: x / Nitrite: x   Leuk Esterase: x / RBC: x / WBC x   Sq Epi: x / Non Sq Epi: x / Bacteria: x            INFLAMMATORY MARKERS      MICROBIOLOGY:              RADIOLOGY & ADDITIONAL STUDIES:

## 2024-06-28 LAB
ALBUMIN SERPL ELPH-MCNC: 2.2 G/DL — LOW (ref 3.3–5)
ALP SERPL-CCNC: 87 U/L — SIGNIFICANT CHANGE UP (ref 40–120)
ALT FLD-CCNC: 30 U/L — SIGNIFICANT CHANGE UP (ref 12–78)
ANION GAP SERPL CALC-SCNC: 5 MMOL/L — SIGNIFICANT CHANGE UP (ref 5–17)
AST SERPL-CCNC: 17 U/L — SIGNIFICANT CHANGE UP (ref 15–37)
BILIRUB SERPL-MCNC: 0.3 MG/DL — SIGNIFICANT CHANGE UP (ref 0.2–1.2)
BUN SERPL-MCNC: 15 MG/DL — SIGNIFICANT CHANGE UP (ref 7–23)
CALCIUM SERPL-MCNC: 8.7 MG/DL — SIGNIFICANT CHANGE UP (ref 8.5–10.1)
CHLORIDE SERPL-SCNC: 104 MMOL/L — SIGNIFICANT CHANGE UP (ref 96–108)
CO2 SERPL-SCNC: 29 MMOL/L — SIGNIFICANT CHANGE UP (ref 22–31)
CREAT SERPL-MCNC: 1.3 MG/DL — SIGNIFICANT CHANGE UP (ref 0.5–1.3)
EGFR: 64 ML/MIN/1.73M2 — SIGNIFICANT CHANGE UP
GLUCOSE BLDC GLUCOMTR-MCNC: 146 MG/DL — HIGH (ref 70–99)
GLUCOSE BLDC GLUCOMTR-MCNC: 149 MG/DL — HIGH (ref 70–99)
GLUCOSE BLDC GLUCOMTR-MCNC: 153 MG/DL — HIGH (ref 70–99)
GLUCOSE BLDC GLUCOMTR-MCNC: 220 MG/DL — HIGH (ref 70–99)
GLUCOSE SERPL-MCNC: 235 MG/DL — HIGH (ref 70–99)
HCT VFR BLD CALC: 35.1 % — LOW (ref 39–50)
HGB BLD-MCNC: 11 G/DL — LOW (ref 13–17)
MCHC RBC-ENTMCNC: 29.8 PG — SIGNIFICANT CHANGE UP (ref 27–34)
MCHC RBC-ENTMCNC: 31.3 GM/DL — LOW (ref 32–36)
MCV RBC AUTO: 95.1 FL — SIGNIFICANT CHANGE UP (ref 80–100)
NRBC # BLD: 0 /100 WBCS — SIGNIFICANT CHANGE UP (ref 0–0)
PLATELET # BLD AUTO: 328 K/UL — SIGNIFICANT CHANGE UP (ref 150–400)
POTASSIUM SERPL-MCNC: 4.1 MMOL/L — SIGNIFICANT CHANGE UP (ref 3.5–5.3)
POTASSIUM SERPL-SCNC: 4.1 MMOL/L — SIGNIFICANT CHANGE UP (ref 3.5–5.3)
PROT SERPL-MCNC: 6 G/DL — SIGNIFICANT CHANGE UP (ref 6–8.3)
RBC # BLD: 3.69 M/UL — LOW (ref 4.2–5.8)
RBC # FLD: 14.3 % — SIGNIFICANT CHANGE UP (ref 10.3–14.5)
SODIUM SERPL-SCNC: 138 MMOL/L — SIGNIFICANT CHANGE UP (ref 135–145)
WBC # BLD: 10.71 K/UL — HIGH (ref 3.8–10.5)
WBC # FLD AUTO: 10.71 K/UL — HIGH (ref 3.8–10.5)

## 2024-06-28 PROCEDURE — 99233 SBSQ HOSP IP/OBS HIGH 50: CPT

## 2024-06-28 RX ORDER — OXYCODONE HYDROCHLORIDE 100 MG/5ML
10 SOLUTION ORAL EVERY 6 HOURS
Refills: 0 | Status: DISCONTINUED | OUTPATIENT
Start: 2024-06-28 | End: 2024-07-05

## 2024-06-28 RX ORDER — VANCOMYCIN HYDROCHLORIDE 50 MG/ML
750 KIT ORAL EVERY 12 HOURS
Refills: 0 | Status: DISCONTINUED | OUTPATIENT
Start: 2024-06-28 | End: 2024-06-28

## 2024-06-28 RX ORDER — VANCOMYCIN HYDROCHLORIDE 50 MG/ML
750 KIT ORAL EVERY 12 HOURS
Refills: 0 | Status: DISCONTINUED | OUTPATIENT
Start: 2024-06-28 | End: 2024-06-30

## 2024-06-28 RX ORDER — VANCOMYCIN HYDROCHLORIDE 50 MG/ML
1000 KIT ORAL EVERY 12 HOURS
Refills: 0 | Status: DISCONTINUED | OUTPATIENT
Start: 2024-06-28 | End: 2024-06-28

## 2024-06-28 RX ADMIN — APIXABAN 5 MILLIGRAM(S): 5 TABLET, FILM COATED ORAL at 17:52

## 2024-06-28 RX ADMIN — ATORVASTATIN CALCIUM 40 MILLIGRAM(S): 20 TABLET, FILM COATED ORAL at 22:04

## 2024-06-28 RX ADMIN — PANTOPRAZOLE SODIUM 40 MILLIGRAM(S): 40 INJECTION, POWDER, FOR SOLUTION INTRAVENOUS at 05:01

## 2024-06-28 RX ADMIN — Medication 1 GRAM(S): at 17:52

## 2024-06-28 RX ADMIN — Medication 100 MILLIGRAM(S): at 05:01

## 2024-06-28 RX ADMIN — Medication 10 MILLIGRAM(S): at 12:33

## 2024-06-28 RX ADMIN — INSULIN LISPRO 2: 100 INJECTION, SOLUTION SUBCUTANEOUS at 17:52

## 2024-06-28 RX ADMIN — METFORMIN HYDROCHLORIDE 1000 MILLIGRAM(S): 850 TABLET, FILM COATED ORAL at 17:51

## 2024-06-28 RX ADMIN — Medication 2 TABLET(S): at 22:04

## 2024-06-28 RX ADMIN — Medication 1 TABLET(S): at 12:33

## 2024-06-28 RX ADMIN — POTASSIUM CHLORIDE 10 MILLIEQUIVALENT(S): 600 TABLET, FILM COATED, EXTENDED RELEASE ORAL at 12:33

## 2024-06-28 RX ADMIN — Medication 50 MILLIGRAM(S): at 17:51

## 2024-06-28 RX ADMIN — Medication 25 MILLIGRAM(S): at 22:04

## 2024-06-28 RX ADMIN — OXYCODONE HYDROCHLORIDE 5 MILLIGRAM(S): 100 SOLUTION ORAL at 17:51

## 2024-06-28 RX ADMIN — INSULIN LISPRO 4: 100 INJECTION, SOLUTION SUBCUTANEOUS at 12:34

## 2024-06-28 RX ADMIN — Medication 50 MILLIGRAM(S): at 05:01

## 2024-06-28 RX ADMIN — METFORMIN HYDROCHLORIDE 1000 MILLIGRAM(S): 850 TABLET, FILM COATED ORAL at 05:58

## 2024-06-28 RX ADMIN — HYDROXYZINE PAMOATE 25 MILLIGRAM(S): 50 CAPSULE ORAL at 17:52

## 2024-06-28 RX ADMIN — VANCOMYCIN HYDROCHLORIDE 250 MILLIGRAM(S): KIT at 09:29

## 2024-06-28 RX ADMIN — OXYCODONE HYDROCHLORIDE 5 MILLIGRAM(S): 100 SOLUTION ORAL at 18:51

## 2024-06-28 RX ADMIN — TIOTROPIUM BROMIDE AND OLODATEROL 2 PUFF(S): 3.124; 2.736 SPRAY, METERED RESPIRATORY (INHALATION) at 05:02

## 2024-06-28 RX ADMIN — Medication 1000 UNIT(S): at 12:33

## 2024-06-28 RX ADMIN — Medication 25 MILLIGRAM(S): at 14:39

## 2024-06-28 RX ADMIN — HYDROXYZINE PAMOATE 25 MILLIGRAM(S): 50 CAPSULE ORAL at 05:01

## 2024-06-28 RX ADMIN — Medication 1 APPLICATION(S): at 12:30

## 2024-06-28 RX ADMIN — NALOXEGOL OXALATE 25 MILLIGRAM(S): 25 TABLET, FILM COATED ORAL at 12:36

## 2024-06-28 RX ADMIN — Medication 5 MILLIGRAM(S): at 22:04

## 2024-06-28 RX ADMIN — Medication 1 GRAM(S): at 05:01

## 2024-06-28 RX ADMIN — DILTIAZEM HYDROCHLORIDE 120 MILLIGRAM(S): 240 CAPSULE, EXTENDED RELEASE ORAL at 14:38

## 2024-06-28 RX ADMIN — Medication 50 MILLIGRAM(S): at 12:34

## 2024-06-28 RX ADMIN — PANTOPRAZOLE SODIUM 40 MILLIGRAM(S): 40 INJECTION, POWDER, FOR SOLUTION INTRAVENOUS at 17:51

## 2024-06-28 RX ADMIN — DILTIAZEM HYDROCHLORIDE 120 MILLIGRAM(S): 240 CAPSULE, EXTENDED RELEASE ORAL at 22:04

## 2024-06-28 RX ADMIN — APIXABAN 5 MILLIGRAM(S): 5 TABLET, FILM COATED ORAL at 05:01

## 2024-06-28 RX ADMIN — Medication 500 MILLIGRAM(S): at 12:34

## 2024-06-28 RX ADMIN — POLYETHYLENE GLYCOL 3350 17 GRAM(S): 1 POWDER ORAL at 12:34

## 2024-06-28 RX ADMIN — Medication 100 MILLIGRAM(S): at 17:51

## 2024-06-28 RX ADMIN — Medication 25 MILLIGRAM(S): at 09:45

## 2024-06-28 RX ADMIN — PREDNISONE 5 MILLIGRAM(S): 10 TABLET ORAL at 05:01

## 2024-06-28 RX ADMIN — ASPIRIN 81 MILLIGRAM(S): 325 TABLET, FILM COATED ORAL at 12:33

## 2024-06-28 RX ADMIN — VANCOMYCIN HYDROCHLORIDE 250 MILLIGRAM(S): KIT at 22:03

## 2024-06-28 NOTE — PROGRESS NOTE ADULT - ASSESSMENT
Claude Villareal is a 52 YO M with past medical history of  AF on Eliquis, indwelling Aguilera, CAD s/p stents, CVA, DMT 2, Hypertension, osteomyelitis s/p debridement, PE, transferred from [facility] for difficulty in breathing.  His recent admission was for osteomyelitis and discharged on IV Vancomycin. On ED arrival he is unresponsive, apneic, no palpable pulse or blood pressure, EKG rhythm HR < 20/min W/O P waves. Patient intubated, Code ACLS activated, IV epinephrine, IV atropine administered, pulse became palpable, still Hypotensive, IV levophed administered. Admitted to ICU consulted,  Patient woke up and agitated try to pull out ETT,  IV propofol started,

## 2024-06-28 NOTE — PROGRESS NOTE ADULT - SUBJECTIVE AND OBJECTIVE BOX
[facility] DIV[facility] of INFECTIOUS DISEASE  Juventino Whitten MD PhD, Symone Hickey MD, Anne-Marie Li MD, Jeffery Jacobs MD, Ryan Romeo MD  and providing coverage with Melody Armstrong MD  Providing Infectious Disease Consultations at [facility], Health system, [facility]'s    Office# [phone] to schedule follow up appointments  Answering Service for urgent calls or New Consults [phone]  Cell# to text for urgent issues Juventino Whitten [phone]     infectious diseases progress note:    SARAH MORRISON is a 54y y. o. Male patient    Overnight and events of the last 24hrs reviewed    Allergies    ertapenem (Blisters; Rash)  fish (Hives)    Intolerances        ANTIBIOTICS/RELEVANT:  antimicrobials  vancomycin  IVPB 1000 milliGRAM(s) IV Intermittent every 12 hours    immunologic:    OTHER:  acetaminophen     Tablet .. 650 milliGRAM(s) Oral every 6 hours PRN  apixaban 5 milliGRAM(s) Oral two times a day  ascorbic acid 500 milliGRAM(s) Oral daily  aspirin enteric coated 81 milliGRAM(s) Oral daily  atorvastatin 40 milliGRAM(s) Oral at bedtime  benzocaine 20% Spray 1 Spray(s) Topical every 6 hours  calamine/zinc oxide Lotion 1 Application(s) Topical two times a day  chlorhexidine 2% Cloths 1 Application(s) Topical daily  cholecalciferol 1000 Unit(s) Oral daily  clobetasol 0.05% Cream 1 Application(s) Topical two times a day  dextrose 10% Bolus 125 milliLiter(s) IV Bolus once  dextrose 5%. 1000 milliLiter(s) IV Continuous <Continuous>  dextrose 5%. 1000 milliLiter(s) IV Continuous <Continuous>  dextrose 50% Injectable 25 Gram(s) IV Push once  dextrose 50% Injectable 12.5 Gram(s) IV Push once  diltiazem    Tablet 120 milliGRAM(s) Oral every 8 hours  diphenhydrAMINE 25 milliGRAM(s) Oral every 4 hours PRN  gabapentin 100 milliGRAM(s) Oral every 12 hours  glucagon  Injectable 1 milliGRAM(s) IntraMuscular once  hydrOXYzine hydrochloride 25 milliGRAM(s) Oral two times a day  insulin lispro (ADMELOG) corrective regimen sliding scale   SubCutaneous at bedtime  insulin lispro (ADMELOG) corrective regimen sliding scale   SubCutaneous three times a day before meals  melatonin 5 milliGRAM(s) Oral at bedtime  metFORMIN 1000 milliGRAM(s) Oral two times a day  metoprolol tartrate 50 milliGRAM(s) Oral every 6 hours  metoprolol tartrate Injectable 5 milliGRAM(s) IV Push every 6 hours PRN  multivitamin 1 Tablet(s) Oral daily  naloxegol 25 milliGRAM(s) Oral daily  ondansetron Injectable 4 milliGRAM(s) IV Push every 6 hours PRN  oxybutynin XL 10 milliGRAM(s) Oral daily  oxyCODONE    IR 5 milliGRAM(s) Oral every 6 hours PRN  pantoprazole    Tablet 40 milliGRAM(s) Oral two times a day  polyethylene glycol 3350 17 Gram(s) Oral daily  potassium chloride    Tablet ER 10 milliEquivalent(s) Oral daily  predniSONE   Tablet 5 milliGRAM(s) Oral daily  senna 2 Tablet(s) Oral at bedtime  sodium chloride 0.65% Nasal 1 Spray(s) Both Nostrils three times a day PRN  sodium chloride 0.9% lock flush 10 milliLiter(s) IV Push every 1 hour PRN  sucralfate 1 Gram(s) Oral two times a day  tiotropium 2.5 MICROgram(s)/olodaterol 2.5 MICROgram(s) (STIOLTO) Inhaler 2 Puff(s) Inhalation daily      Objective:  Vital Signs Last 24 Hrs  T(C): 36.7 (28 Jun 2024 12:05), Max: 36.7 (28 Jun 2024 04:47)  T(F): 98.1 (28 Jun 2024 12:05), Max: 98.1 (28 Jun 2024 04:47)  HR: 101 (28 Jun 2024 12:31) (86 - 113)  BP: 104/73 (28 Jun 2024 12:31) (99/64 - 119/86)  BP(mean): --  RR: 18 (28 Jun 2024 12:31) (16 - 18)  SpO2: 97% (28 Jun 2024 12:31) (94% - 97%)    Parameters below as of 28 Jun 2024 12:31  Patient On (Oxygen Delivery Method): room air        T(C): 36.7 (06-28-24 @ 12:05), Max: 36.8 (06-26-24 @ 20:11)  T(C): 36.7 (06-28-24 @ 12:05), Max: 37.1 (06-25-24 @ 20:44)  T(C): 36.7 (06-28-24 @ 12:05), Max: 37.2 (06-25-24 @ 05:00)    PHYSICAL EXAM:  HEENT: NC atraumatic  Neck: supple  Respiratory: no accessory muscle use, breathing comfortably  Cardiovascular: distant  Gastrointestinal: normal appearing, nondistended  Extremities: no clubbing, no cyanosis,        LABS:                          11.0   10.71 )-----------( 328      ( 28 Jun 2024 09:45 )             35.1       WBC  10.71 06-28 @ 09:45  10.40 06-27 @ 06:22  11.48 06-26 @ 18:10  9.99 06-24 @ 12:40  6.63 06-22 @ 14:08      06-28    138  |  104  |  15  ----------------------------<  235<H>  4.1   |  29  |  1.30    Ca    8.7      28 Jun 2024 09:45    TPro  6.0  /  Alb  2.2<L>  /  TBili  0.3  /  DBili  x   /  AST  17  /  ALT  30  /  AlkPhos  87  06-28      Creatinine: 1.30 mg/dL (06-28-24 @ 09:45)  Creatinine: 1.10 mg/dL (06-27-24 @ 06:22)  Creatinine: 1.40 mg/dL (06-26-24 @ 18:10)  Creatinine: 1.20 mg/dL (06-24-24 @ 12:40)  Creatinine: 1.10 mg/dL (06-22-24 @ 14:08)        Urinalysis Basic - ( 28 Jun 2024 09:45 )    Color: x / Appearance: x / SG: x / pH: x  Gluc: 235 mg/dL / Ketone: x  / Bili: x / Urobili: x   Blood: x / Protein: x / Nitrite: x   Leuk Esterase: x / RBC: x / WBC x   Sq Epi: x / Non Sq Epi: x / Bacteria: x            INFLAMMATORY MARKERS      MICROBIOLOGY:              RADIOLOGY & ADDITIONAL STUDIES:

## 2024-06-28 NOTE — PHARMACOTHERAPY INTERVENTION NOTE - COMMENTS
Vancomycin Dosing Per Pharmacy:  Patient is a 52 yo male being treated for R heel wound/osteomyelitis, currently ordered for IV vancomycin. Dose increased to 1000mg Q12H.     1. Indication for the vancomycin: R heel wound/osteomyelitis  2. Requesting Provider: Dr. Whitten  3. Current plan and dosing regimen: 1000mg Q12   4. Day of therapy: 24  5. Cultures: Tissue Cx 5/26/24: E faecalis + MRSA. Tissue Cx 6/4/24: ESBL E coli + E faecalis. R heel surgical swab 6/14/24: MRSA + citrobacter freundii + pseudomonas stutzeri.  6. Target trough or AUC/BAYLEE: 400-600  7. Day and time level is due: 06/30 08:30  8. Previous levels: 6/4 (19:31): 22, 6/5 (05:47): 14, 6/6 (20:20): 14, 6/9 (04:58): 14.4, 6/11 (04:30): 15.7, 6/13 (05:54): 17.3, 6/14 (01:25): 15.2, 6/14 (04:30): 16.1, 6/15 (5:40): 17.5, 6/16 (5:38): 15.3, 6/17 (05:52): 13.2, 6/18 (05:50): 11.8, 6/19 (06:15): 21.3, 6/20 (08:52): 21.6, 6/21 (09:30): 37.5, 06/22 (0945): 11.1, 06/23 (09:30): 15.9, 06/24 (08:00): 19.6, 06/27 (21:47): 18.2   9. Expected 24-hour AUC: 415 Vancomycin Dosing Per Pharmacy:  Patient is a 54 yo male being treated for R heel wound/osteomyelitis on vancomycin 750mg q12h. Trough last night resulted as 18.2, current . Will follow up repeat trough 6/30 0830.     1. Indication for the vancomycin: R heel wound/osteomyelitis  2. Requesting Provider: Dr. Whitten  3. Current plan and dosing regimen: [id number]   4. Day of therapy: 24  5. Cultures: Tissue Cx 5/26/24: E faecalis + MRSA. Tissue Cx 6/4/24: ESBL E coli + E faecalis. R heel surgical swab 6/14/24: MRSA + citrobacter freundii + pseudomonas stutzeri.  6. Target trough or AUC/BAYLEE: 400-600  7. Day and time level is due: 06/30 08:30  8. Previous levels: 6/4 (19:31): 22, 6/5 (05:47): 14, 6/6 (20:20): 14, 6/9 (04:58): 14.4, 6/11 (04:30): 15.7, 6/13 (05:54): 17.3, 6/14 (01:25): 15.2, 6/14 (04:30): 16.1, 6/15 (5:40): 17.5, 6/16 (5:38): 15.3, 6/17 (05:52): 13.2, 6/18 (05:50): 11.8, 6/19 (06:15): 21.3, 6/20 (08:52): 21.6, 6/21 (09:30): 37.5, 06/22 (0945): 11.1, 06/23 (09:30): 15.9, 06/24 (08:00): 19.6, 06/27 (21:47): 18.2   9. Expected 24-hour AUC: 515 Vancomycin Dosing Per Pharmacy:  Patient is a 54 y/o male being treated for R heel wound/osteomyelitis on vancomycin 750mg q12h. Trough last night resulted as 18.2, current . Will follow up repeat trough 6/29 0800.     1. Indication for the vancomycin: R heel wound/osteomyelitis  2. Requesting Provider: Dr. Whitten  3. Current plan and dosing regimen: [id number]   4. Day of therapy: 24  5. Cultures: Tissue Cx 5/26/24: E faecalis + MRSA. Tissue Cx 6/4/24: ESBL E coli + E faecalis. R heel surgical swab 6/14/24: MRSA + citrobacter freundii + pseudomonas stutzeri.  6. Target trough or AUC/BAYLEE: 400-600  7. Day and time level is due: 06/29 0800  8. Previous levels: 6/4 (19:31): 22, 6/5 (05:47): 14, 6/6 (20:20): 14, 6/9 (04:58): 14.4, 6/11 (04:30): 15.7, 6/13 (05:54): 17.3, 6/14 (01:25): 15.2, 6/14 (04:30): 16.1, 6/15 (5:40): 17.5, 6/16 (5:38): 15.3, 6/17 (05:52): 13.2, 6/18 (05:50): 11.8, 6/19 (06:15): 21.3, 6/20 (08:52): 21.6, 6/21 (09:30): 37.5, 06/22 (0945): 11.1, 06/23 (09:30): 15.9, 06/24 (08:00): 19.6, 06/27 (21:47): 18.2   9. Expected 24-hour AUC: 515 Vancomycin Dosing Per Pharmacy:  Patient is a 52 y/o male being treated for R heel wound/osteomyelitis on vancomycin 750mg q12h, current . Will follow up repeat trough 6/29 0800.     1. Indication for the vancomycin: R heel wound/osteomyelitis  2. Requesting Provider: Dr. Whitten  3. Current plan and dosing regimen: [id number]   4. Day of therapy: 24  5. Cultures: Tissue Cx 5/26/24: E faecalis + MRSA. Tissue Cx 6/4/24: ESBL E coli + E faecalis. R heel surgical swab 6/14/24: MRSA + citrobacter freundii + pseudomonas stutzeri.  6. Target trough or AUC/BAYLEE: 400-600  7. Day and time level is due: 06/29 0800  8. Previous levels: 6/4 (19:31): 22, 6/5 (05:47): 14, 6/6 (20:20): 14, 6/9 (04:58): 14.4, 6/11 (04:30): 15.7, 6/13 (05:54): 17.3, 6/14 (01:25): 15.2, 6/14 (04:30): 16.1, 6/15 (5:40): 17.5, 6/16 (5:38): 15.3, 6/17 (05:52): 13.2, 6/18 (05:50): 11.8, 6/19 (06:15): 21.3, 6/20 (08:52): 21.6, 6/21 (09:30): 37.5, 06/22 (0945): 11.1, 06/23 (09:30): 15.9, 06/24 (08:00): 19.6, 06/27 (21:47): 18.2   9. Expected 24-hour AUC: 515

## 2024-06-28 NOTE — PROGRESS NOTE ADULT - ASSESSMENT
54 YO M with past medical history of AFib (on Eliquis), indwelling Aguilera, cerebral aneurysm, CAD (s/p stents), CVA, T2DM, HTN, OM (s/p debridement), PE, perforated gastric ulcer s/p ometnopexy 2019 with Dr. Mejia, transferred from [facility] for difficulty in breathing.      CAD, Afib, s/p Cardiac Arrest, Mod-Severe MR, HTN  - s/p cardiac arrest x2 w/ AHRF.  s/p intubation with successful extubation.   - Now tolerating RA    - Trops + but no sig trend. No clear evidence of acute ischemia  - Continue ASA and statin      - Known AFib with controlled rates overnight.  Had intermittent mild tachy due to missed doses today in the setting of soft BP  - Continue Cardizem and BB with more lenient parameter.  Would allow HR of 110's  - Continue Eliquis    - TTE normal EF and mod-severe MR  - No sign of volume overload on exam  - Hold Lasix 20mg po qd for now to allow room for AVN blockers (ordered)    - ATN with creat normalized  - With diffuse rashes +pruritus.  Recommend Derma.  ID following  - Monitor and replete lytes, keep K>4, Mg>2.  - Will continue to follow.    Henrietta Kim DNP, NP-C, AGACNP-C  Cardiology   Call TEAMS          52 YO M with past medical history of AFib (on Eliquis), indwelling Aguilera, cerebral aneurysm, CAD (s/p stents), CVA, T2DM, HTN, OM (s/p debridement), PE, perforated gastric ulcer s/p ometnopexy 2019 with Dr. Mejia, transferred from [facility] for difficulty in breathing.      CAD, Afib, s/p Cardiac Arrest, Mod-Severe MR, HTN  - s/p cardiac arrest x2 w/ AHRF.  s/p intubation with successful extubation.   - Now tolerating RA    - Trops + but no sig trend. No clear evidence of acute ischemia  - Continue ASA and statin      - Known AFib with controlled rates overnight.  Had intermittent mild tachy due to missed doses today in the setting of soft BP  - Continue Cardizem and BB with more lenient parameter. Would switch to Metoprolol Succinate 100mg BID. Would allow HR of 100s-110  - Continue Eliquis    - TTE normal EF and mod-severe MR  - No sign of volume overload on exam  - Hold Lasix 20mg po qd for now to allow room for AVN blockers (ordered)    - ATN with creat normalized  - With diffuse rashes +pruritus.  Recommend Derma.  ID following  - Monitor and replete lytes, keep K>4, Mg>2.  - Will continue to follow.    Henrietta Kim DNP, NP-C, AGACNP-C  Cardiology   Call TEAMS

## 2024-06-28 NOTE — PROGRESS NOTE ADULT - SUBJECTIVE AND OBJECTIVE BOX
[facility] -- Brittany Lutz, Zahra, Román Jacobs, , Gera Maya  Office # [phone]    Follow Up:  s/p Cardiac Arrest, Afib with SVR, Diastolic HF    Subjective/Observations: Non-orthopneic on RA.  No complaints at this time.  No further tachycardia per flow sheet.  Denies pruritus    REVIEW OF SYSTEMS: All other review of systems is negative unless indicated above  PAST MEDICAL & SURGICAL HISTORY:  Diabetes      Diabetes mellitus with no complication      Afib      Hypertension      BPH (benign prostatic hyperplasia)      Perforated gastric ulcer  s/p emergent ex-lap omentopexy and plication 6/2019      Pulmonary embolism      History of non-ST elevation myocardial infarction (NSTEMI)      Osteomyelitis  s/p debridement      CAD S/P percutaneous coronary angioplasty      Cerebrovascular accident      H/O abdominal surgery      Perforated gastric ulcer      Traumatic amputation of left foot, initial encounter        MEDICATIONS  (STANDING):  apixaban 5 milliGRAM(s) Oral two times a day  ascorbic acid 500 milliGRAM(s) Oral daily  aspirin enteric coated 81 milliGRAM(s) Oral daily  atorvastatin 40 milliGRAM(s) Oral at bedtime  benzocaine 20% Spray 1 Spray(s) Topical every 6 hours  calamine/zinc oxide Lotion 1 Application(s) Topical two times a day  chlorhexidine 2% Cloths 1 Application(s) Topical daily  cholecalciferol 1000 Unit(s) Oral daily  clobetasol 0.05% Cream 1 Application(s) Topical two times a day  dextrose 10% Bolus 125 milliLiter(s) IV Bolus once  dextrose 5%. 1000 milliLiter(s) (50 mL/Hr) IV Continuous <Continuous>  dextrose 5%. 1000 milliLiter(s) (100 mL/Hr) IV Continuous <Continuous>  dextrose 50% Injectable 25 Gram(s) IV Push once  dextrose 50% Injectable 12.5 Gram(s) IV Push once  diltiazem    Tablet 120 milliGRAM(s) Oral every 8 hours  gabapentin 100 milliGRAM(s) Oral every 12 hours  glucagon  Injectable 1 milliGRAM(s) IntraMuscular once  hydrOXYzine hydrochloride 25 milliGRAM(s) Oral two times a day  insulin lispro (ADMELOG) corrective regimen sliding scale   SubCutaneous at bedtime  insulin lispro (ADMELOG) corrective regimen sliding scale   SubCutaneous three times a day before meals  melatonin 5 milliGRAM(s) Oral at bedtime  metFORMIN 1000 milliGRAM(s) Oral two times a day  metoprolol tartrate 50 milliGRAM(s) Oral every 6 hours  multivitamin 1 Tablet(s) Oral daily  naloxegol 25 milliGRAM(s) Oral daily  oxybutynin XL 10 milliGRAM(s) Oral daily  pantoprazole    Tablet 40 milliGRAM(s) Oral two times a day  polyethylene glycol 3350 17 Gram(s) Oral daily  potassium chloride    Tablet ER 10 milliEquivalent(s) Oral daily  predniSONE   Tablet 5 milliGRAM(s) Oral daily  senna 2 Tablet(s) Oral at bedtime  sucralfate 1 Gram(s) Oral two times a day  tiotropium 2.5 MICROgram(s)/olodaterol 2.5 MICROgram(s) (STIOLTO) Inhaler 2 Puff(s) Inhalation daily  vancomycin  IVPB 1000 milliGRAM(s) IV Intermittent every 12 hours    MEDICATIONS  (PRN):  acetaminophen     Tablet .. 650 milliGRAM(s) Oral every 6 hours PRN Mild Pain (1 - 3)  diphenhydrAMINE 25 milliGRAM(s) Oral every 4 hours PRN Rash and/or Itching  metoprolol tartrate Injectable 5 milliGRAM(s) IV Push every 6 hours PRN HR >140  ondansetron Injectable 4 milliGRAM(s) IV Push every 6 hours PRN Nausea and/or Vomiting  oxyCODONE    IR 5 milliGRAM(s) Oral every 6 hours PRN Moderate Pain (4 - 6)  sodium chloride 0.65% Nasal 1 Spray(s) Both Nostrils three times a day PRN Nasal Congestion  sodium chloride 0.9% lock flush 10 milliLiter(s) IV Push every 1 hour PRN Pre/post blood products, medications, blood draw, and to maintain line patency    Allergies    ertapenem (Blisters; Rash)  fish (Hives)    Intolerances      Vital Signs Last 24 Hrs  T(C): 36.7 (28 Jun 2024 04:47), Max: 36.7 (28 Jun 2024 04:47)  T(F): 98.1 (28 Jun 2024 04:47), Max: 98.1 (28 Jun 2024 04:47)  HR: 92 (28 Jun 2024 06:00) (86 - 114)  BP: 99/67 (28 Jun 2024 06:00) (99/64 - 128/87)  BP(mean): --  RR: 18 (28 Jun 2024 04:47) (16 - 18)  SpO2: 94% (28 Jun 2024 04:47) (94% - 97%)    Parameters below as of 27 Jun 2024 23:21  Patient On (Oxygen Delivery Method): room air      I&O's Summary    27 Jun 2024 07:01  -  28 Jun 2024 07:00  --------------------------------------------------------  IN: 0 mL / OUT: 2950 mL / NET: -2950 mL    PHYSICAL EXAM:  TELE: Not on tele  Constitutional: NAD, awake and alert  HEENT: Moist Mucous Membranes, Anicteric  Pulmonary: Non-labored, breath sounds are clear but diminished bilaterally, No wheezing, rales or rhonchi  Cardiovascular: IRRR, S1 and S2, +murmurs, no rubs, gallops or clicks  Gastrointestinal: Bowel Sounds present, soft, nontender.   Lymph: No peripheral edema. No lymphadenopathy.  Skin: Generalized rashes.  +skin breakdown on sacrum  Psych:  Mood & affect appropriate    LABS: All Labs Reviewed:                        11.0   10.71 )-----------( 328      ( 28 Jun 2024 09:45 )             35.1                         9.7    10.40 )-----------( 311      ( 27 Jun 2024 06:22 )             30.3                         10.7   11.48 )-----------( 327      ( 26 Jun 2024 18:10 )             32.5     28 Jun 2024 09:45    138    |  104    |  15     ----------------------------<  235    4.1     |  29     |  1.30   27 Jun 2024 06:22    139    |  106    |  16     ----------------------------<  256    3.7     |  27     |  1.10   26 Jun 2024 18:10    137    |  104    |  19     ----------------------------<  357    4.2     |  25     |  1.40     Ca    8.7        28 Jun 2024 09:45  Ca    8.8        27 Jun 2024 06:22  Ca    8.9        26 Jun 2024 18:10    TPro  6.0    /  Alb  2.2    /  TBili  0.3    /  DBili  x      /  AST  17     /  ALT  30     /  AlkPhos  87     28 Jun 2024 09:45          12 Lead ECG:   Ventricular Rate 126 BPM    QRS Duration 76 ms    Q-T Interval 302 ms    QTC Calculation(Bazett) 437 ms    R Axis -12 degrees    T Axis 103 degrees    Diagnosis Line Atrial fibrillation with rapid ventricular response  Low voltage QRS  Nonspecific ST and T wave abnormality  Abnormal ECG  When compared with ECG of 14-JUN-2024 11:03,  Questionable change in QRS axis  Nonspecific T wave abnormality now evident in Inferior leads  Nonspecific T wave abnormality, worse in Anterolateral leads  Confirmed by Juventino Soto MD ([id number]) on 6/20/2024 12:52:09 PM (06-20-24 @ 11:37)      TRANSTHORACIC ECHOCARDIOGRAM REPORT  ________________________________________________________________________________                                      _______       Pt. Name:       SARAH MORRISON Study Date:    6/14/2024  MRN:            [id number]         YOB: 1970  Accession #:    [id number]        Age:           53 years  Account#:       [id number]       Gender:        M  Visit ID#  Heart Rate:                      Height:        72.05 in (183.00 cm)  Rhythm:          Weight:        222.66 lb (101.00 kg)  Blood Pressure: 88/53 mmHg       BSA/BMI:       2.23 m² / 30.16 kg/m²  ________________________________________________________________________________________  Referring Physician:    [id number] Hill Brizuela  Interpreting Physician: Rahel Boss MD  Primary Sonographer:    Mounika FELDMAN    CPT:               ECHO TTE WO CON COMP W DOPP - 92000.m  Indication(s):     Heart failure, unspecified - I50.9  Procedure:         Transthoracic echocardiogram with 2-D, M-mode and complete                     spectral and color flow Doppler.  Ordering Location: ICU1  Admission Status:  Inpatient    _______________________________________________________________________________________     CONCLUSIONS:      1. Left ventricular cavity is normal in size. Left ventricular systolic function is normal with an ejection fraction visually estimated at 55 to 60 %. There are no regional wall motion abnormalities seen.   2. Moderate to severe left ventricular hypertrophy.   3. Normal right ventricular cavity size, with normal wall thickness, and normal systolic function.   4. The left atrium is severely dilated.   5. The right atrium is severely dilated.   6. Trileaflet aortic valve with normal systolic excursion. There is focal calcification of the aortic valve leaflets.   7. Mild to moderate mitral regurgitation.   8. There is moderate calcification of the mitral valve annulus.   9. Structurally normal tricuspid valve with normal leaflet excursion.Mild tricuspid regurgitation.  10. The inferior vena cava is normal in size measuring 1.70 cm in diameter, (normal <2.1cm) with normal inspiratory collapse (normal >50%) consistent with normal right atrial pressure (~3, range 0-5mmHg).  11. Estimatedpulmonary artery systolic pressure is 43 mmHg.    ________________________________________________________________________________________  FINDINGS:     Left Ventricle:  The left ventricular cavity is normal in size. Left ventricular systolic function is normal with an ejection fraction visually estimated at 55 to 60%. There are no regional wall motion abnormalities seen. There is normal left ventricular diastolic function, with normal filling pressure. Moderate to severe left ventricular hypertrophy.     Right Ventricle:  The right ventricular cavity is normal in size, with normal wall thickness and normal systolic function.     Left Atrium:  The left atrium is severely dilated.     Right Atrium:  The right atrium is severely dilated.     Aortic Valve:  The aortic valve appears trileaflet with normal systolic excursion. There is focal calcification of the aortic valve leaflets. There is no aortic valve stenosis. There is no evidence of aortic regurgitation.     Mitral Valve:  There is moderate calcification of the mitral valve annulus. There is mild to moderate mitral regurgitation.     Tricuspid Valve:  Structurally normal tricuspid valve with normal leaflet excursion. There is mild tricuspid regurgitation. Estimated pulmonary artery systolic pressure is 43 mmHg.     Pulmonic Valve:  The pulmonic valve was not well visualized.     Systemic Veins:  The inferior vena cava is normal in size measuring 1.70 cm in diameter, (normal <2.1cm) with normal inspiratory collapse (normal >50%) consistent with normal right atrial pressure (~3, range 0-5mmHg).  ____________________________________________________________________  QUANTITATIVE DATA:  Left Ventricle Measurements: (Indexed to BSA)     IVSd (2D):   1.7 cm  LVPWd (2D):  1.4 cm  LVIDd (2D):  4.3 cm  LVIDs (2D):  3.0 cm  LV Mass:     281 g  125.9 g/m²  Visualized LV EF%: 55 to 60%     MV E Vmax:    1.07 m/s  MV A Vmax:    0.00 m/s  e' lateral:   9.68 cm/s  e' medial:    10.90 cm/s  E/e' lateral: 11.05  E/e' medial:  9.82  E/e' Average: 10.40  MV DT:        180 msec    Aorta Measurements: (Normal range) (Indexed to BSA)     Sinuses of Valsalva: 3.40 cm (3.1 - 3.7 cm)       Left Atrium Measurements: (Indexed to BSA)  LA Diam 2D: 4.00 cm       LVOT / RVOT/ Qp/Qs Data: (Indexed to BSA)  LVOT Diameter: 2.10 cm  LVOT Area:     3.46 cm²    Mitral Valve Measurements:     MV E Vmax: 1.1 m/s  MV A Vmax: 0.0 m/s       Tricuspid Valve Measurements:     TR Vmax:          3.2 m/s  TR Peak Gradient: 40.4 mmHg  RA Pressure:      3 mmHg  PASP:             43 mmHg    ________________________________________________________________________________________  Electronically signed on 6/14/2024 at 12:08:59 PM by Rahel Boss MD         *** Final ***      [facility] -- Brittany Lutz, Zahra, Román Jacobs, , Gera Maya  Office # [phone]    Follow Up:  s/p Cardiac Arrest, Afib with SVR, Diastolic HF    Subjective/Observations: Non-orthopneic on RA.  No complaints at this time.  No further tachycardia per flow sheet.  Denies pruritus    REVIEW OF SYSTEMS: All other review of systems is negative unless indicated above  PAST MEDICAL & SURGICAL HISTORY:  Diabetes      Diabetes mellitus with no complication      Afib      Hypertension      BPH (benign prostatic hyperplasia)      Perforated gastric ulcer  s/p emergent ex-lap omentopexy and plication 6/2019      Pulmonary embolism      History of non-ST elevation myocardial infarction (NSTEMI)      Osteomyelitis  s/p debridement      CAD S/P percutaneous coronary angioplasty      Cerebrovascular accident      H/O abdominal surgery      Perforated gastric ulcer      Traumatic amputation of left foot, initial encounter        MEDICATIONS  (STANDING):  apixaban 5 milliGRAM(s) Oral two times a day  ascorbic acid 500 milliGRAM(s) Oral daily  aspirin enteric coated 81 milliGRAM(s) Oral daily  atorvastatin 40 milliGRAM(s) Oral at bedtime  benzocaine 20% Spray 1 Spray(s) Topical every 6 hours  calamine/zinc oxide Lotion 1 Application(s) Topical two times a day  chlorhexidine 2% Cloths 1 Application(s) Topical daily  cholecalciferol 1000 Unit(s) Oral daily  clobetasol 0.05% Cream 1 Application(s) Topical two times a day  dextrose 10% Bolus 125 milliLiter(s) IV Bolus once  dextrose 5%. 1000 milliLiter(s) (50 mL/Hr) IV Continuous <Continuous>  dextrose 5%. 1000 milliLiter(s) (100 mL/Hr) IV Continuous <Continuous>  dextrose 50% Injectable 25 Gram(s) IV Push once  dextrose 50% Injectable 12.5 Gram(s) IV Push once  diltiazem    Tablet 120 milliGRAM(s) Oral every 8 hours  gabapentin 100 milliGRAM(s) Oral every 12 hours  glucagon  Injectable 1 milliGRAM(s) IntraMuscular once  hydrOXYzine hydrochloride 25 milliGRAM(s) Oral two times a day  insulin lispro (ADMELOG) corrective regimen sliding scale   SubCutaneous at bedtime  insulin lispro (ADMELOG) corrective regimen sliding scale   SubCutaneous three times a day before meals  melatonin 5 milliGRAM(s) Oral at bedtime  metFORMIN 1000 milliGRAM(s) Oral two times a day  metoprolol tartrate 50 milliGRAM(s) Oral every 6 hours  multivitamin 1 Tablet(s) Oral daily  naloxegol 25 milliGRAM(s) Oral daily  oxybutynin XL 10 milliGRAM(s) Oral daily  pantoprazole    Tablet 40 milliGRAM(s) Oral two times a day  polyethylene glycol 3350 17 Gram(s) Oral daily  potassium chloride    Tablet ER 10 milliEquivalent(s) Oral daily  predniSONE   Tablet 5 milliGRAM(s) Oral daily  senna 2 Tablet(s) Oral at bedtime  sucralfate 1 Gram(s) Oral two times a day  tiotropium 2.5 MICROgram(s)/olodaterol 2.5 MICROgram(s) (STIOLTO) Inhaler 2 Puff(s) Inhalation daily  vancomycin  IVPB 1000 milliGRAM(s) IV Intermittent every 12 hours    MEDICATIONS  (PRN):  acetaminophen     Tablet .. 650 milliGRAM(s) Oral every 6 hours PRN Mild Pain (1 - 3)  diphenhydrAMINE 25 milliGRAM(s) Oral every 4 hours PRN Rash and/or Itching  metoprolol tartrate Injectable 5 milliGRAM(s) IV Push every 6 hours PRN HR >140  ondansetron Injectable 4 milliGRAM(s) IV Push every 6 hours PRN Nausea and/or Vomiting  oxyCODONE    IR 5 milliGRAM(s) Oral every 6 hours PRN Moderate Pain (4 - 6)  sodium chloride 0.65% Nasal 1 Spray(s) Both Nostrils three times a day PRN Nasal Congestion  sodium chloride 0.9% lock flush 10 milliLiter(s) IV Push every 1 hour PRN Pre/post blood products, medications, blood draw, and to maintain line patency    Allergies    ertapenem (Blisters; Rash)  fish (Hives)    Intolerances      Vital Signs Last 24 Hrs  T(C): 36.7 (28 Jun 2024 04:47), Max: 36.7 (28 Jun 2024 04:47)  T(F): 98.1 (28 Jun 2024 04:47), Max: 98.1 (28 Jun 2024 04:47)  HR: 92 (28 Jun 2024 06:00) (86 - 114)  BP: 99/67 (28 Jun 2024 06:00) (99/64 - 128/87)  BP(mean): --  RR: 18 (28 Jun 2024 04:47) (16 - 18)  SpO2: 94% (28 Jun 2024 04:47) (94% - 97%)    Parameters below as of 27 Jun 2024 23:21  Patient On (Oxygen Delivery Method): room air      I&O's Summary    27 Jun 2024 07:01  -  28 Jun 2024 07:00  --------------------------------------------------------  IN: 0 mL / OUT: 2950 mL / NET: -2950 mL    PHYSICAL EXAM:  TELE: Not on tele  Constitutional: NAD, awake and alert  HEENT: Moist Mucous Membranes, Anicteric  Pulmonary: Non-labored, breath sounds are clear but diminished bilaterally, No wheezing, rales or rhonchi  Cardiovascular: IRRR, S1 and S2, +murmurs, no rubs, gallops or clicks  Gastrointestinal: Bowel Sounds present, soft, nontender.   Lymph: No peripheral edema. No lymphadenopathy.  Skin: Generalized rashes.  +skin breakdown on sacrum  Psych:  Mood & affect appropriate    LABS: All Labs Reviewed:                        11.0   10.71 )-----------( 328      ( 28 Jun 2024 09:45 )             35.1                         9.7    10.40 )-----------( 311      ( 27 Jun 2024 06:22 )             30.3                         10.7   11.48 )-----------( 327      ( 26 Jun 2024 18:10 )             32.5     28 Jun 2024 09:45    138    |  104    |  15     ----------------------------<  235    4.1     |  29     |  1.30   27 Jun 2024 06:22    139    |  106    |  16     ----------------------------<  256    3.7     |  27     |  1.10   26 Jun 2024 18:10    137    |  104    |  19     ----------------------------<  357    4.2     |  25     |  1.40     Ca    8.7        28 Jun 2024 09:45  Ca    8.8        27 Jun 2024 06:22  Ca    8.9        26 Jun 2024 18:10    TPro  6.0    /  Alb  2.2    /  TBili  0.3    /  DBili  x      /  AST  17     /  ALT  30     /  AlkPhos  87     28 Jun 2024 09:45          12 Lead ECG:   Ventricular Rate 126 BPM    QRS Duration 76 ms    Q-T Interval 302 ms    QTC Calculation(Bazett) 437 ms    R Axis -12 degrees    T Axis 103 degrees    Diagnosis Line Atrial fibrillation with rapid ventricular response  Low voltage QRS  Nonspecific ST and T wave abnormality  Abnormal ECG  When compared with ECG of 14-JUN-2024 11:03,  Questionable change in QRS axis  Nonspecific T wave abnormality now evident in Inferior leads  Nonspecific T wave abnormality, worse in Anterolateral leads  Confirmed by Juvetnino Soto MD ([id number]) on 6/20/2024 12:52:09 PM (06-20-24 @ 11:37)      TRANSTHORACIC ECHOCARDIOGRAM REPORT  ________________________________________________________________________________                                      _______       Pt. Name:       SARAH MORRISON Study Date:    6/14/2024  MRN:            [id number]         YOB: 1970  Accession #:    [id number]        Age:           53 years  Account#:       [id number]       Gender:        M  Visit ID#  Heart Rate:                      Height:        72.05 in (183.00 cm)  Rhythm:          Weight:        222.66 lb (101.00 kg)  Blood Pressure: 88/53 mmHg       BSA/BMI:       2.23 m² / 30.16 kg/m²  ________________________________________________________________________________________  Referring Physician:    [id number] Hill Brizuela  Interpreting Physician: Rahel Boss MD  Primary Sonographer:    Mounika FELDMAN    CPT:               ECHO TTE WO CON COMP W DOPP - 00670.m  Indication(s):     Heart failure, unspecified - I50.9  Procedure:         Transthoracic echocardiogram with 2-D, M-mode and complete                     spectral and color flow Doppler.  Ordering Location: ICU1  Admission Status:  Inpatient    _______________________________________________________________________________________     CONCLUSIONS:      1. Left ventricular cavity is normal in size. Left ventricular systolic function is normal with an ejection fraction visually estimated at 55 to 60 %. There are no regional wall motion abnormalities seen.   2. Moderate to severe left ventricular hypertrophy.   3. Normal right ventricular cavity size, with normal wall thickness, and normal systolic function.   4. The left atrium is severely dilated.   5. The right atrium is severely dilated.   6. Trileaflet aortic valve with normal systolic excursion. There is focal calcification of the aortic valve leaflets.   7. Mild to moderate mitral regurgitation.   8. There is moderate calcification of the mitral valve annulus.   9. Structurally normal tricuspid valve with normal leaflet excursion.Mild tricuspid regurgitation.  10. The inferior vena cava is normal in size measuring 1.70 cm in diameter, (normal <2.1cm) with normal inspiratory collapse (normal >50%) consistent with normal right atrial pressure (~3, range 0-5mmHg).  11. Estimatedpulmonary artery systolic pressure is 43 mmHg.    ________________________________________________________________________________________  FINDINGS:     Left Ventricle:  The left ventricular cavity is normal in size. Left ventricular systolic function is normal with an ejection fraction visually estimated at 55 to 60%. There are no regional wall motion abnormalities seen. There is normal left ventricular diastolic function, with normal filling pressure. Moderate to severe left ventricular hypertrophy.     Right Ventricle:  The right ventricular cavity is normal in size, with normal wall thickness and normal systolic function.     Left Atrium:  The left atrium is severely dilated.     Right Atrium:  The right atrium is severely dilated.     Aortic Valve:  The aortic valve appears trileaflet with normal systolic excursion. There is focal calcification of the aortic valve leaflets. There is no aortic valve stenosis. There is no evidence of aortic regurgitation.     Mitral Valve:  There is moderate calcification of the mitral valve annulus. There is mild to moderate mitral regurgitation.     Tricuspid Valve:  Structurally normal tricuspid valve with normal leaflet excursion. There is mild tricuspid regurgitation. Estimated pulmonary artery systolic pressure is 43 mmHg.     Pulmonic Valve:  The pulmonic valve was not well visualized.     Systemic Veins:  The inferior vena cava is normal in size measuring 1.70 cm in diameter, (normal <2.1cm) with normal inspiratory collapse (normal >50%) consistent with normal right atrial pressure (~3, range 0-5mmHg).  ____________________________________________________________________  QUANTITATIVE DATA:  Left Ventricle Measurements: (Indexed to BSA)     IVSd (2D):   1.7 cm  LVPWd (2D):  1.4 cm  LVIDd (2D):  4.3 cm  LVIDs (2D):  3.0 cm  LV Mass:     281 g  125.9 g/m²  Visualized LV EF%: 55 to 60%     MV E Vmax:    1.07 m/s  MV A Vmax:    0.00 m/s  e' lateral:   9.68 cm/s  e' medial:    10.90 cm/s  E/e' lateral: 11.05  E/e' medial:  9.82  E/e' Average: 10.40  MV DT:        180 msec    Aorta Measurements: (Normal range) (Indexed to BSA)     Sinuses of Valsalva: 3.40 cm (3.1 - 3.7 cm)       Left Atrium Measurements: (Indexed to BSA)  LA Diam 2D: 4.00 cm       LVOT / RVOT/ Qp/Qs Data: (Indexed to BSA)  LVOT Diameter: 2.10 cm  LVOT Area:     3.46 cm²    Mitral Valve Measurements:     MV E Vmax: 1.1 m/s  MV A Vmax: 0.0 m/s       Tricuspid Valve Measurements:     TR Vmax:          3.2 m/s  TR Peak Gradient: 40.4 mmHg  RA Pressure:      3 mmHg  PASP:             43 mmHg    ________________________________________________________________________________________  Electronically signed on 6/14/2024 at 12:08:59 PM by Rahel Boss MD         *** Final ***

## 2024-06-28 NOTE — PROGRESS NOTE ADULT - SUBJECTIVE AND OBJECTIVE BOX
Date of Service 06-28-24 @ 16:36    Patient is a 54y old  Male who presents with a chief complaint of Acute respiratory failure with hypoxia (28 Jun 2024 13:03)      INTERVAL /OVERNIGHT EVENTS: nausea better    MEDICATIONS  (STANDING):  apixaban 5 milliGRAM(s) Oral two times a day  ascorbic acid 500 milliGRAM(s) Oral daily  aspirin enteric coated 81 milliGRAM(s) Oral daily  atorvastatin 40 milliGRAM(s) Oral at bedtime  benzocaine 20% Spray 1 Spray(s) Topical every 6 hours  calamine/zinc oxide Lotion 1 Application(s) Topical two times a day  chlorhexidine 2% Cloths 1 Application(s) Topical daily  cholecalciferol 1000 Unit(s) Oral daily  clobetasol 0.05% Cream 1 Application(s) Topical two times a day  dextrose 10% Bolus 125 milliLiter(s) IV Bolus once  dextrose 5%. 1000 milliLiter(s) (50 mL/Hr) IV Continuous <Continuous>  dextrose 5%. 1000 milliLiter(s) (100 mL/Hr) IV Continuous <Continuous>  dextrose 50% Injectable 25 Gram(s) IV Push once  dextrose 50% Injectable 12.5 Gram(s) IV Push once  diltiazem    Tablet 120 milliGRAM(s) Oral every 8 hours  gabapentin 100 milliGRAM(s) Oral every 12 hours  glucagon  Injectable 1 milliGRAM(s) IntraMuscular once  hydrOXYzine hydrochloride 25 milliGRAM(s) Oral two times a day  insulin lispro (ADMELOG) corrective regimen sliding scale   SubCutaneous at bedtime  insulin lispro (ADMELOG) corrective regimen sliding scale   SubCutaneous three times a day before meals  melatonin 5 milliGRAM(s) Oral at bedtime  metFORMIN 1000 milliGRAM(s) Oral two times a day  metoprolol tartrate 50 milliGRAM(s) Oral every 6 hours  multivitamin 1 Tablet(s) Oral daily  naloxegol 25 milliGRAM(s) Oral daily  oxybutynin XL 10 milliGRAM(s) Oral daily  pantoprazole    Tablet 40 milliGRAM(s) Oral two times a day  polyethylene glycol 3350 17 Gram(s) Oral daily  potassium chloride    Tablet ER 10 milliEquivalent(s) Oral daily  predniSONE   Tablet 5 milliGRAM(s) Oral daily  senna 2 Tablet(s) Oral at bedtime  sucralfate 1 Gram(s) Oral two times a day  tiotropium 2.5 MICROgram(s)/olodaterol 2.5 MICROgram(s) (STIOLTO) Inhaler 2 Puff(s) Inhalation daily  vancomycin  IVPB 750 milliGRAM(s) IV Intermittent every 12 hours    MEDICATIONS  (PRN):  acetaminophen     Tablet .. 650 milliGRAM(s) Oral every 6 hours PRN Mild Pain (1 - 3)  diphenhydrAMINE 25 milliGRAM(s) Oral every 4 hours PRN Rash and/or Itching  metoprolol tartrate Injectable 5 milliGRAM(s) IV Push every 6 hours PRN HR >140  ondansetron Injectable 4 milliGRAM(s) IV Push every 6 hours PRN Nausea and/or Vomiting  oxyCODONE    IR 5 milliGRAM(s) Oral every 6 hours PRN Moderate Pain (4 - 6)  sodium chloride 0.65% Nasal 1 Spray(s) Both Nostrils three times a day PRN Nasal Congestion  sodium chloride 0.9% lock flush 10 milliLiter(s) IV Push every 1 hour PRN Pre/post blood products, medications, blood draw, and to maintain line patency      Allergies    ertapenem (Blisters; Rash)  fish (Hives)    Intolerances        REVIEW OF SYSTEMS:  CONSTITUTIONAL: + fatigue  EYES: No eye pain, visual disturbances, or discharge  ENMT:  No difficulty hearing, tinnitus, vertigo; No sinus or throat pain  NECK: No pain or stiffness  RESPIRATORY: No cough, wheezing, chills or hemoptysis; No shortness of breath  CARDIOVASCULAR: No chest pain, palpitations, dizziness, or leg swelling  GASTROINTESTINAL: No abdominal or epigastric pain. No nausea, vomiting, or hematemesis; No diarrhea or constipation. No melena or hematochezia.  GENITOURINARY: No dysuria, frequency, hematuria, or incontinence  NEUROLOGICAL: No headaches, memory loss, loss of strength, numbness, or tremors  SKIN: No itching, burning, rashes, or lesions   LYMPH NODES: No enlarged glands  ENDOCRINE: No heat or cold intolerance; No hair loss; No polydipsia or polyuria  MUSCULOSKELETAL: No joint pain or swelling; No muscle, back, or extremity pain  PSYCHIATRIC: No depression, anxiety, mood swings, or difficulty sleeping  HEME/LYMPH: No easy bruising, or bleeding gums  ALLERGY AND IMMUNOLOGIC: No hives or eczema    Vital Signs Last 24 Hrs  T(C): 36.7 (28 Jun 2024 12:05), Max: 36.7 (28 Jun 2024 04:47)  T(F): 98.1 (28 Jun 2024 12:05), Max: 98.1 (28 Jun 2024 04:47)  HR: 107 (28 Jun 2024 14:33) (86 - 107)  BP: 115/79 (28 Jun 2024 14:33) (99/64 - 119/86)  BP(mean): --  RR: 18 (28 Jun 2024 14:33) (16 - 18)  SpO2: 98% (28 Jun 2024 14:33) (94% - 98%)    Parameters below as of 28 Jun 2024 14:33  Patient On (Oxygen Delivery Method): room air        PHYSICAL EXAM:  GENERAL: NAD, well-groomed, well-developed  HEAD:  Atraumatic, Normocephalic  EYES: EOMI, PERRLA, conjunctiva and sclera clear  ENMT: No tonsillar erythema, exudates, or enlargement; Moist mucous membranes, Good dentition, No lesions  NECK: Supple, No JVD, Normal thyroid  NERVOUS SYSTEM:  Alert & Oriented X3, Good concentration; Motor Strength 5/5 B/L upper and lower extremities; DTRs 2+ intact and symmetric  CHEST/LUNG: Clear to auscultation bilaterally; No rales, rhonchi, wheezing, or rubs  HEART: Regular rate and rhythm; No murmurs, rubs, or gallops  ABDOMEN: Soft, Nontender, Nondistended; Bowel sounds present  EXTREMITIES:  2+ Peripheral Pulses, No clubbing, cyanosis, or edema  LYMPH: No lymphadenopathy noted  SKIN: No rashes or lesions    LABS:                        11.0   10.71 )-----------( 328      ( 28 Jun 2024 09:45 )             35.1     28 Jun 2024 09:45    138    |  104    |  15     ----------------------------<  235    4.1     |  29     |  1.30     Ca    8.7        28 Jun 2024 09:45    TPro  6.0    /  Alb  2.2    /  TBili  0.3    /  DBili  x      /  AST  17     /  ALT  30     /  AlkPhos  87     28 Jun 2024 09:45      Urinalysis Basic - ( 28 Jun 2024 09:45 )    Color: x / Appearance: x / SG: x / pH: x  Gluc: 235 mg/dL / Ketone: x  / Bili: x / Urobili: x   Blood: x / Protein: x / Nitrite: x   Leuk Esterase: x / RBC: x / WBC x   Sq Epi: x / Non Sq Epi: x / Bacteria: x      CAPILLARY BLOOD GLUCOSE      POCT Blood Glucose.: 220 mg/dL (28 Jun 2024 11:54)  POCT Blood Glucose.: 149 mg/dL (28 Jun 2024 07:45)  POCT Blood Glucose.: 146 mg/dL (27 Jun 2024 21:56)  POCT Blood Glucose.: 153 mg/dL (27 Jun 2024 17:19)      RADIOLOGY & ADDITIONAL TESTS:    Notes Reviewed:  [x ] YES  [ ] NO    Care Discussed with Consultants/Other Providers [x ] YES  [ ] NO

## 2024-06-28 NOTE — PROGRESS NOTE ADULT - ASSESSMENT
52 YO M with  AF on Eliquis, indwelling Aguilera, CAD s/p stents, CVA, DMT 2, Hypertension, osteomyelitis s/p debridement, PE, transferred from [facility] for difficulty in breathing.  His recent admission was for osteomyelitis and discharged on IV Vancomycin. Reported at [facility] he was given a dose of ertapenem and developed difficulty breathing followed by apnea. On ED arrival he was unresponsive, apneic, no palpable pulse or blood pressure, EKG rhythm HR < 20/min W/O P waves. Patient intubated,     RECOMMENDATIONS  1-Asystolic cardiac arrest, Acute hypoxic respiratory failure, Shock  - unclear etiology  -rec avoiding carbapenems and fine with just Vanco  -ICU support and monitoring    2-Osteomyeliits right calcaneous  6/4 S/p Selective debridement of wound 04-Jun-2024 10:52:57  Parviz Todd  6/4  TCx MRSA, Escherichia coli ESBL  Right calcaneus bone pathology:  -   Fragments of bone with chronic osteomyelitis.  -   Focal acute osteomyelitis cannot be ruled out.  C/w vancomycin 750 mg IV q12, dosing per pharmacy protocol and plan had been with inability to rule out osteo rec 6 weeks so last day 7/18  PICC placed 6/7    3-Rash still has not seen dermatology so we have no diagnosis of chronic issue but has improved from recent acute blistering    From an ID standpoint no further requirement for inpatient status for the management of ID issues. Fine with discharge from ID standpoint when other medical issues no longer require inpatient care and social issues allow for a safe discharge plan. To schedule an outpatient ID follow up appointment please call our office at [phone]    Thank you for consulting us and involving us in the management of this most interesting and challenging case.  We will follow along in the care of this patient. Please call us at [phone] or text me directly on my cell# at [phone] with any concerns.

## 2024-06-29 LAB
GLUCOSE BLDC GLUCOMTR-MCNC: 137 MG/DL — HIGH (ref 70–99)
GLUCOSE BLDC GLUCOMTR-MCNC: 149 MG/DL — HIGH (ref 70–99)
GLUCOSE BLDC GLUCOMTR-MCNC: 189 MG/DL — HIGH (ref 70–99)
GLUCOSE BLDC GLUCOMTR-MCNC: 219 MG/DL — HIGH (ref 70–99)
VANCOMYCIN TROUGH SERPL-MCNC: 36.4 UG/ML — CRITICAL HIGH (ref 10–20)

## 2024-06-29 PROCEDURE — 99232 SBSQ HOSP IP/OBS MODERATE 35: CPT

## 2024-06-29 RX ORDER — FUROSEMIDE 10 MG/ML
20 INJECTION, SOLUTION INTRAMUSCULAR; INTRAVENOUS DAILY
Refills: 0 | Status: DISCONTINUED | OUTPATIENT
Start: 2024-06-29 | End: 2024-07-01

## 2024-06-29 RX ORDER — METOPROLOL TARTRATE 50 MG
100 TABLET ORAL
Refills: 0 | Status: DISCONTINUED | OUTPATIENT
Start: 2024-06-29 | End: 2024-07-05

## 2024-06-29 RX ADMIN — DILTIAZEM HYDROCHLORIDE 120 MILLIGRAM(S): 240 CAPSULE, EXTENDED RELEASE ORAL at 22:33

## 2024-06-29 RX ADMIN — Medication 10 MILLIGRAM(S): at 12:39

## 2024-06-29 RX ADMIN — Medication 500 MILLIGRAM(S): at 12:41

## 2024-06-29 RX ADMIN — Medication 100 MILLIGRAM(S): at 18:16

## 2024-06-29 RX ADMIN — Medication 1 GRAM(S): at 18:16

## 2024-06-29 RX ADMIN — DILTIAZEM HYDROCHLORIDE 120 MILLIGRAM(S): 240 CAPSULE, EXTENDED RELEASE ORAL at 15:48

## 2024-06-29 RX ADMIN — OXYCODONE HYDROCHLORIDE 10 MILLIGRAM(S): 100 SOLUTION ORAL at 22:32

## 2024-06-29 RX ADMIN — Medication 1 SPRAY(S): at 12:40

## 2024-06-29 RX ADMIN — APIXABAN 5 MILLIGRAM(S): 5 TABLET, FILM COATED ORAL at 18:16

## 2024-06-29 RX ADMIN — Medication 50 MILLIGRAM(S): at 01:00

## 2024-06-29 RX ADMIN — Medication 1 APPLICATION(S): at 13:15

## 2024-06-29 RX ADMIN — PREDNISONE 5 MILLIGRAM(S): 10 TABLET ORAL at 06:23

## 2024-06-29 RX ADMIN — PANTOPRAZOLE SODIUM 40 MILLIGRAM(S): 40 INJECTION, POWDER, FOR SOLUTION INTRAVENOUS at 06:23

## 2024-06-29 RX ADMIN — Medication 1 GRAM(S): at 06:23

## 2024-06-29 RX ADMIN — NALOXEGOL OXALATE 25 MILLIGRAM(S): 25 TABLET, FILM COATED ORAL at 12:39

## 2024-06-29 RX ADMIN — Medication 100 MILLIGRAM(S): at 06:24

## 2024-06-29 RX ADMIN — PANTOPRAZOLE SODIUM 40 MILLIGRAM(S): 40 INJECTION, POWDER, FOR SOLUTION INTRAVENOUS at 18:16

## 2024-06-29 RX ADMIN — OXYCODONE HYDROCHLORIDE 10 MILLIGRAM(S): 100 SOLUTION ORAL at 23:00

## 2024-06-29 RX ADMIN — ASPIRIN 81 MILLIGRAM(S): 325 TABLET, FILM COATED ORAL at 12:40

## 2024-06-29 RX ADMIN — VANCOMYCIN HYDROCHLORIDE 250 MILLIGRAM(S): KIT at 20:31

## 2024-06-29 RX ADMIN — Medication 1000 UNIT(S): at 12:40

## 2024-06-29 RX ADMIN — Medication 25 MILLIGRAM(S): at 22:32

## 2024-06-29 RX ADMIN — Medication 50 MILLIGRAM(S): at 06:23

## 2024-06-29 RX ADMIN — Medication 100 MILLIGRAM(S): at 18:15

## 2024-06-29 RX ADMIN — HYDROXYZINE PAMOATE 25 MILLIGRAM(S): 50 CAPSULE ORAL at 06:23

## 2024-06-29 RX ADMIN — Medication 1 APPLICATION(S): at 18:46

## 2024-06-29 RX ADMIN — INSULIN LISPRO 2: 100 INJECTION, SOLUTION SUBCUTANEOUS at 12:48

## 2024-06-29 RX ADMIN — HYDROXYZINE PAMOATE 25 MILLIGRAM(S): 50 CAPSULE ORAL at 18:15

## 2024-06-29 RX ADMIN — DILTIAZEM HYDROCHLORIDE 120 MILLIGRAM(S): 240 CAPSULE, EXTENDED RELEASE ORAL at 06:24

## 2024-06-29 RX ADMIN — FUROSEMIDE 20 MILLIGRAM(S): 10 INJECTION, SOLUTION INTRAMUSCULAR; INTRAVENOUS at 18:19

## 2024-06-29 RX ADMIN — Medication 1 TABLET(S): at 12:41

## 2024-06-29 RX ADMIN — VANCOMYCIN HYDROCHLORIDE 250 MILLIGRAM(S): KIT at 09:27

## 2024-06-29 RX ADMIN — METFORMIN HYDROCHLORIDE 1000 MILLIGRAM(S): 850 TABLET, FILM COATED ORAL at 18:17

## 2024-06-29 RX ADMIN — ATORVASTATIN CALCIUM 40 MILLIGRAM(S): 20 TABLET, FILM COATED ORAL at 22:32

## 2024-06-29 RX ADMIN — Medication 1 SPRAY(S): at 18:15

## 2024-06-29 RX ADMIN — Medication 5 MILLIGRAM(S): at 22:32

## 2024-06-29 RX ADMIN — METFORMIN HYDROCHLORIDE 1000 MILLIGRAM(S): 850 TABLET, FILM COATED ORAL at 06:24

## 2024-06-29 RX ADMIN — POTASSIUM CHLORIDE 10 MILLIEQUIVALENT(S): 600 TABLET, FILM COATED, EXTENDED RELEASE ORAL at 12:41

## 2024-06-29 RX ADMIN — APIXABAN 5 MILLIGRAM(S): 5 TABLET, FILM COATED ORAL at 06:23

## 2024-06-29 RX ADMIN — CLOBETASOL PROPIONATE 1 APPLICATION(S): 0.5 CREAM TOPICAL at 18:47

## 2024-06-29 RX ADMIN — Medication 2 TABLET(S): at 22:32

## 2024-06-29 NOTE — PROGRESS NOTE ADULT - ASSESSMENT
Claude Villareal is a 54 YO M with past medical history of  AF on Eliquis, indwelling Aguilera, CAD s/p stents, CVA, DMT 2, Hypertension, osteomyelitis s/p debridement, PE, transferred from [facility] for difficulty in breathing.  His recent admission was for osteomyelitis and discharged on IV Vancomycin. On ED arrival he is unresponsive, apneic, no palpable pulse or blood pressure, EKG rhythm HR < 20/min W/O P waves. Patient intubated, Code ACLS activated, IV epinephrine, IV atropine administered, pulse became palpable, still Hypotensive, IV levophed administered. Admitted to ICU consulted,  Patient woke up and agitated try to pull out ETT,  IV propofol started,

## 2024-06-29 NOTE — PROGRESS NOTE ADULT - SUBJECTIVE AND OBJECTIVE BOX
[facility] -- Brittany Lutz, Reynaldo Sweeney Savella, , Gera Maya  Office # [phone]    Follow Up:   s/p Cardiac Arrest, Afib with SVR, Diastolic HF    Subjective/Observations: Remains comfortable on RA.  Still c/o itching.  Otherwise, denies any form of respiratory or cardiac discomfort    REVIEW OF SYSTEMS: All other review of systems is negative unless indicated above  PAST MEDICAL & SURGICAL HISTORY:  Diabetes  Diabetes mellitus with no complication  Afib  Hypertension  BPH (benign prostatic hyperplasia)  Perforated gastric ulcer  s/p emergent ex-lap omentopexy and plication 6/2019  Pulmonary embolism  History of non-ST elevation myocardial infarction (NSTEMI)  Osteomyelitis  s/p debridement  CAD S/P percutaneous coronary angioplasty  Cerebrovascular accident  H/O abdominal surgery  Perforated gastric ulcer  Traumatic amputation of left foot, initial encounter    MEDICATIONS  (STANDING):  apixaban 5 milliGRAM(s) Oral two times a day  ascorbic acid 500 milliGRAM(s) Oral daily  aspirin enteric coated 81 milliGRAM(s) Oral daily  atorvastatin 40 milliGRAM(s) Oral at bedtime  benzocaine 20% Spray 1 Spray(s) Topical every 6 hours  calamine/zinc oxide Lotion 1 Application(s) Topical two times a day  chlorhexidine 2% Cloths 1 Application(s) Topical daily  cholecalciferol 1000 Unit(s) Oral daily  clobetasol 0.05% Cream 1 Application(s) Topical two times a day  dextrose 10% Bolus 125 milliLiter(s) IV Bolus once  dextrose 5%. 1000 milliLiter(s) (50 mL/Hr) IV Continuous <Continuous>  dextrose 5%. 1000 milliLiter(s) (100 mL/Hr) IV Continuous <Continuous>  dextrose 50% Injectable 25 Gram(s) IV Push once  dextrose 50% Injectable 12.5 Gram(s) IV Push once  diltiazem    Tablet 120 milliGRAM(s) Oral every 8 hours  gabapentin 100 milliGRAM(s) Oral every 12 hours  glucagon  Injectable 1 milliGRAM(s) IntraMuscular once  hydrOXYzine hydrochloride 25 milliGRAM(s) Oral two times a day  insulin lispro (ADMELOG) corrective regimen sliding scale   SubCutaneous at bedtime  insulin lispro (ADMELOG) corrective regimen sliding scale   SubCutaneous three times a day before meals  melatonin 5 milliGRAM(s) Oral at bedtime  metFORMIN 1000 milliGRAM(s) Oral two times a day  metoprolol tartrate 50 milliGRAM(s) Oral every 6 hours  multivitamin 1 Tablet(s) Oral daily  naloxegol 25 milliGRAM(s) Oral daily  oxybutynin XL 10 milliGRAM(s) Oral daily  pantoprazole    Tablet 40 milliGRAM(s) Oral two times a day  polyethylene glycol 3350 17 Gram(s) Oral daily  potassium chloride    Tablet ER 10 milliEquivalent(s) Oral daily  predniSONE   Tablet 5 milliGRAM(s) Oral daily  senna 2 Tablet(s) Oral at bedtime  sucralfate 1 Gram(s) Oral two times a day  tiotropium 2.5 MICROgram(s)/olodaterol 2.5 MICROgram(s) (STIOLTO) Inhaler 2 Puff(s) Inhalation daily  vancomycin  IVPB 750 milliGRAM(s) IV Intermittent every 12 hours    MEDICATIONS  (PRN):  acetaminophen     Tablet .. 650 milliGRAM(s) Oral every 6 hours PRN Mild Pain (1 - 3)  diphenhydrAMINE 25 milliGRAM(s) Oral every 4 hours PRN Rash and/or Itching  metoprolol tartrate Injectable 5 milliGRAM(s) IV Push every 6 hours PRN HR >140  ondansetron Injectable 4 milliGRAM(s) IV Push every 6 hours PRN Nausea and/or Vomiting  oxyCODONE    IR 10 milliGRAM(s) Oral every 6 hours PRN Severe Pain (7 - 10)  oxyCODONE    IR 5 milliGRAM(s) Oral every 6 hours PRN Moderate Pain (4 - 6)  sodium chloride 0.65% Nasal 1 Spray(s) Both Nostrils three times a day PRN Nasal Congestion  sodium chloride 0.9% lock flush 10 milliLiter(s) IV Push every 1 hour PRN Pre/post blood products, medications, blood draw, and to maintain line patency    Allergies    ertapenem (Blisters; Rash)  fish (Hives)    Intolerances    Vital Signs Last 24 Hrs  T(C): 36.6 (29 Jun 2024 04:38), Max: 36.9 (28 Jun 2024 21:15)  T(F): 97.9 (29 Jun 2024 04:38), Max: 98.5 (28 Jun 2024 21:15)  HR: 100 (29 Jun 2024 06:20) (68 - 107)  BP: 105/65 (29 Jun 2024 06:20) (102/68 - 126/78)  BP(mean): --  RR: 18 (29 Jun 2024 06:20) (17 - 19)  SpO2: 95% (29 Jun 2024 06:20) (95% - 98%)    Parameters below as of 29 Jun 2024 06:20  Patient On (Oxygen Delivery Method): room air    PHYSICAL EXAM:  TELE: Not on tele  Constitutional: NAD, awake and alert  HEENT: Moist Mucous Membranes, Anicteric  Pulmonary: Non-labored, breath sounds are clear but diminished bilaterally, No wheezing, rales or rhonchi  Cardiovascular: IRRR, S1 and S2, +murmurs, no rubs, gallops or clicks  Gastrointestinal: Bowel Sounds present, soft, nontender.   Lymph: No peripheral edema. No lymphadenopathy.  Skin: Generalized rashes.  +skin breakdown on sacrum.  Left heel ulcer with dressing  Psych:  Mood & affect appropriate    I&O's Summary    28 Jun 2024 07:01  -  29 Jun 2024 07:00  --------------------------------------------------------  IN: 0 mL / OUT: 2000 mL / NET: -2000 mL      LABS: All Labs Reviewed:                        11.0   10.71 )-----------( 328      ( 28 Jun 2024 09:45 )             35.1                         9.7    10.40 )-----------( 311      ( 27 Jun 2024 06:22 )             30.3                         10.7   11.48 )-----------( 327      ( 26 Jun 2024 18:10 )             32.5     28 Jun 2024 09:45    138    |  104    |  15     ----------------------------<  235    4.1     |  29     |  1.30   27 Jun 2024 06:22    139    |  106    |  16     ----------------------------<  256    3.7     |  27     |  1.10   26 Jun 2024 18:10    137    |  104    |  19     ----------------------------<  357    4.2     |  25     |  1.40     Ca    8.7        28 Jun 2024 09:45  Ca    8.8        27 Jun 2024 06:22  Ca    8.9        26 Jun 2024 18:10    TPro  6.0    /  Alb  2.2    /  TBili  0.3    /  DBili  x      /  AST  17     /  ALT  30     /  AlkPhos  87     28 Jun 2024 09:45          12 Lead ECG:   Ventricular Rate 126 BPM    QRS Duration 76 ms    Q-T Interval 302 ms    QTC Calculation(Bazett) 437 ms    R Axis -12 degrees    T Axis 103 degrees    Diagnosis Line Atrial fibrillation with rapid ventricular response  Low voltage QRS  Nonspecific ST and T wave abnormality  Abnormal ECG  When compared with ECG of 14-JUN-2024 11:03,  Questionable change in QRS axis  Nonspecific T wave abnormality now evident in Inferior leads  Nonspecific T wave abnormality, worse in Anterolateral leads  Confirmed by Juventino Soto MD ([id number]) on 6/20/2024 12:52:09 PM (06-20-24 @ 11:37)      TRANSTHORACIC ECHOCARDIOGRAM REPORT  ________________________________________________________________________________                                      _______       Pt. Name:       SARAH MORRISON Study Date:    6/14/2024  MRN:            [id number]         YOB: 1970  Accession #:    [id number]        Age:           53 years  Account#:       [id number]       Gender:        M  Visit ID#  Heart Rate:                      Height:        72.05 in (183.00 cm)  Rhythm:          Weight:        222.66 lb (101.00 kg)  Blood Pressure: 88/53 mmHg       BSA/BMI:       2.23 m² / 30.16 kg/m²  ________________________________________________________________________________________  Referring Physician:    [id number] Hill Brizuela  Interpreting Physician: Rahel Boss MD  Primary Sonographer:    Mounika FELDMAN    CPT:               ECHO TTE WO CON COMP W DOPP - 58195.m  Indication(s):     Heart failure, unspecified - I50.9  Procedure:         Transthoracic echocardiogram with 2-D, M-mode and complete                     spectral and color flow Doppler.  Ordering Location: ICU1  Admission Status:  Inpatient    _______________________________________________________________________________________     CONCLUSIONS:      1. Left ventricular cavity is normal in size. Left ventricular systolic function is normal with an ejection fraction visually estimated at 55 to 60 %. There are no regional wall motion abnormalities seen.   2. Moderate to severe left ventricular hypertrophy.   3. Normal right ventricular cavity size, with normal wall thickness, and normal systolic function.   4. The left atrium is severely dilated.   5. The right atrium is severely dilated.   6. Trileaflet aortic valve with normal systolic excursion. There is focal calcification of the aortic valve leaflets.   7. Mild to moderate mitral regurgitation.   8. There is moderate calcification of the mitral valve annulus.   9. Structurally normal tricuspid valve with normal leaflet excursion.Mild tricuspid regurgitation.  10. The inferior vena cava is normal in size measuring 1.70 cm in diameter, (normal <2.1cm) with normal inspiratory collapse (normal >50%) consistent with normal right atrial pressure (~3, range 0-5mmHg).  11. Estimatedpulmonary artery systolic pressure is 43 mmHg.    ________________________________________________________________________________________  FINDINGS:     Left Ventricle:  The left ventricular cavity is normal in size. Left ventricular systolic function is normal with an ejection fraction visually estimated at 55 to 60%. There are no regional wall motion abnormalities seen. There is normal left ventricular diastolic function, with normal filling pressure. Moderate to severe left ventricular hypertrophy.     Right Ventricle:  The right ventricular cavity is normal in size, with normal wall thickness and normal systolic function.     Left Atrium:  The left atrium is severely dilated.     Right Atrium:  The right atrium is severely dilated.     Aortic Valve:  The aortic valve appears trileaflet with normal systolic excursion. There is focal calcification of the aortic valve leaflets. There is no aortic valve stenosis. There is no evidence of aortic regurgitation.     Mitral Valve:  There is moderate calcification of the mitral valve annulus. There is mild to moderate mitral regurgitation.     Tricuspid Valve:  Structurally normal tricuspid valve with normal leaflet excursion. There is mild tricuspid regurgitation. Estimated pulmonary artery systolic pressure is 43 mmHg.     Pulmonic Valve:  The pulmonic valve was not well visualized.     Systemic Veins:  The inferior vena cava is normal in size measuring 1.70 cm in diameter, (normal <2.1cm) with normal inspiratory collapse (normal >50%) consistent with normal right atrial pressure (~3, range 0-5mmHg).  ____________________________________________________________________  QUANTITATIVE DATA:  Left Ventricle Measurements: (Indexed to BSA)     IVSd (2D):   1.7 cm  LVPWd (2D):  1.4 cm  LVIDd (2D):  4.3 cm  LVIDs (2D):  3.0 cm  LV Mass:     281 g  125.9 g/m²  Visualized LV EF%: 55 to 60%     MV E Vmax:    1.07 m/s  MV A Vmax:    0.00 m/s  e' lateral:   9.68 cm/s  e' medial:    10.90 cm/s  E/e' lateral: 11.05  E/e' medial:  9.82  E/e' Average: 10.40  MV DT:        180 msec    Aorta Measurements: (Normal range) (Indexed to BSA)     Sinuses of Valsalva: 3.40 cm (3.1 - 3.7 cm)     Left Atrium Measurements: (Indexed to BSA)  LA Diam 2D: 4.00 c     LVOT / RVOT/ Qp/Qs Data: (Indexed to BSA)  LVOT Diameter: 2.10 cm  LVOT Area:     3.46 cm²    Mitral Valve Measurements:     MV E Vmax: 1.1 m/s  MV A Vmax: 0.0 m/s  Tricuspid Valve Measurements:     TR Vmax:          3.2 m/s  TR Peak Gradient: 40.4 mmHg  RA Pressure:      3 mmHg  PASP:             43 mmHg______________________________________________________________________________________  Electronically signed on 6/14/2024 at 12:08:59 PM by Rahel Boss MD  *** Final ***      [facility] -- Brittany Lutz, Reynaldo Sweeney Savella, , Gera Maya  Office # [phone]    Follow Up:   s/p Cardiac Arrest, Afib with SVR, Diastolic HF    Subjective/Observations: Remains comfortable on RA.  Still c/o itching.  Otherwise, denies any form of respiratory or cardiac discomfort    REVIEW OF SYSTEMS: All other review of systems is negative unless indicated above  PAST MEDICAL & SURGICAL HISTORY:  Diabetes  Diabetes mellitus with no complication  Afib  Hypertension  BPH (benign prostatic hyperplasia)  Perforated gastric ulcer  s/p emergent ex-lap omentopexy and plication 6/2019  Pulmonary embolism  History of non-ST elevation myocardial infarction (NSTEMI)  Osteomyelitis  s/p debridement  CAD S/P percutaneous coronary angioplasty  Cerebrovascular accident  H/O abdominal surgery  Perforated gastric ulcer  Traumatic amputation of left foot, initial encounter    MEDICATIONS  (STANDING):  apixaban 5 milliGRAM(s) Oral two times a day  ascorbic acid 500 milliGRAM(s) Oral daily  aspirin enteric coated 81 milliGRAM(s) Oral daily  atorvastatin 40 milliGRAM(s) Oral at bedtime  benzocaine 20% Spray 1 Spray(s) Topical every 6 hours  calamine/zinc oxide Lotion 1 Application(s) Topical two times a day  chlorhexidine 2% Cloths 1 Application(s) Topical daily  cholecalciferol 1000 Unit(s) Oral daily  clobetasol 0.05% Cream 1 Application(s) Topical two times a day  dextrose 10% Bolus 125 milliLiter(s) IV Bolus once  dextrose 5%. 1000 milliLiter(s) (50 mL/Hr) IV Continuous <Continuous>  dextrose 5%. 1000 milliLiter(s) (100 mL/Hr) IV Continuous <Continuous>  dextrose 50% Injectable 25 Gram(s) IV Push once  dextrose 50% Injectable 12.5 Gram(s) IV Push once  diltiazem    Tablet 120 milliGRAM(s) Oral every 8 hours  gabapentin 100 milliGRAM(s) Oral every 12 hours  glucagon  Injectable 1 milliGRAM(s) IntraMuscular once  hydrOXYzine hydrochloride 25 milliGRAM(s) Oral two times a day  insulin lispro (ADMELOG) corrective regimen sliding scale   SubCutaneous at bedtime  insulin lispro (ADMELOG) corrective regimen sliding scale   SubCutaneous three times a day before meals  melatonin 5 milliGRAM(s) Oral at bedtime  metFORMIN 1000 milliGRAM(s) Oral two times a day  metoprolol tartrate 50 milliGRAM(s) Oral every 6 hours  multivitamin 1 Tablet(s) Oral daily  naloxegol 25 milliGRAM(s) Oral daily  oxybutynin XL 10 milliGRAM(s) Oral daily  pantoprazole    Tablet 40 milliGRAM(s) Oral two times a day  polyethylene glycol 3350 17 Gram(s) Oral daily  potassium chloride    Tablet ER 10 milliEquivalent(s) Oral daily  predniSONE   Tablet 5 milliGRAM(s) Oral daily  senna 2 Tablet(s) Oral at bedtime  sucralfate 1 Gram(s) Oral two times a day  tiotropium 2.5 MICROgram(s)/olodaterol 2.5 MICROgram(s) (STIOLTO) Inhaler 2 Puff(s) Inhalation daily  vancomycin  IVPB 750 milliGRAM(s) IV Intermittent every 12 hours    MEDICATIONS  (PRN):  acetaminophen     Tablet .. 650 milliGRAM(s) Oral every 6 hours PRN Mild Pain (1 - 3)  diphenhydrAMINE 25 milliGRAM(s) Oral every 4 hours PRN Rash and/or Itching  metoprolol tartrate Injectable 5 milliGRAM(s) IV Push every 6 hours PRN HR >140  ondansetron Injectable 4 milliGRAM(s) IV Push every 6 hours PRN Nausea and/or Vomiting  oxyCODONE    IR 10 milliGRAM(s) Oral every 6 hours PRN Severe Pain (7 - 10)  oxyCODONE    IR 5 milliGRAM(s) Oral every 6 hours PRN Moderate Pain (4 - 6)  sodium chloride 0.65% Nasal 1 Spray(s) Both Nostrils three times a day PRN Nasal Congestion  sodium chloride 0.9% lock flush 10 milliLiter(s) IV Push every 1 hour PRN Pre/post blood products, medications, blood draw, and to maintain line patency    Allergies    ertapenem (Blisters; Rash)  fish (Hives)    Intolerances    Vital Signs Last 24 Hrs  T(C): 36.6 (29 Jun 2024 04:38), Max: 36.9 (28 Jun 2024 21:15)  T(F): 97.9 (29 Jun 2024 04:38), Max: 98.5 (28 Jun 2024 21:15)  HR: 100 (29 Jun 2024 06:20) (68 - 107)  BP: 105/65 (29 Jun 2024 06:20) (102/68 - 126/78)  BP(mean): --  RR: 18 (29 Jun 2024 06:20) (17 - 19)  SpO2: 95% (29 Jun 2024 06:20) (95% - 98%)    Parameters below as of 29 Jun 2024 06:20  Patient On (Oxygen Delivery Method): room air    PHYSICAL EXAM:  TELE: Not on tele  Constitutional: NAD, awake and alert  HEENT: Moist Mucous Membranes, Anicteric  Pulmonary: Non-labored, breath sounds are clear but diminished bilaterally, No wheezing, rales or rhonchi  Cardiovascular: IRRR, S1 and S2, +murmurs, no rubs, gallops or clicks  Gastrointestinal: Bowel Sounds present, soft, nontender.   Lymph: No peripheral edema. No lymphadenopathy.  Skin: Generalized rashes.  +skin breakdown on sacrum.  Left heel ulcer with dressing  Psych:  Mood & affect appropriate    I&O's Summary    28 Jun 2024 07:01  -  29 Jun 2024 07:00  --------------------------------------------------------  IN: 0 mL / OUT: 2000 mL / NET: -2000 mL      LABS: All Labs Reviewed:                        11.0   10.71 )-----------( 328      ( 28 Jun 2024 09:45 )             35.1                         9.7    10.40 )-----------( 311      ( 27 Jun 2024 06:22 )             30.3                         10.7   11.48 )-----------( 327      ( 26 Jun 2024 18:10 )             32.5     28 Jun 2024 09:45    138    |  104    |  15     ----------------------------<  235    4.1     |  29     |  1.30   27 Jun 2024 06:22    139    |  106    |  16     ----------------------------<  256    3.7     |  27     |  1.10   26 Jun 2024 18:10    137    |  104    |  19     ----------------------------<  357    4.2     |  25     |  1.40     Ca    8.7        28 Jun 2024 09:45  Ca    8.8        27 Jun 2024 06:22  Ca    8.9        26 Jun 2024 18:10    TPro  6.0    /  Alb  2.2    /  TBili  0.3    /  DBili  x      /  AST  17     /  ALT  30     /  AlkPhos  87     28 Jun 2024 09:45          12 Lead ECG:   Ventricular Rate 126 BPM    QRS Duration 76 ms    Q-T Interval 302 ms    QTC Calculation(Bazett) 437 ms    R Axis -12 degrees    T Axis 103 degrees    Diagnosis Line Atrial fibrillation with rapid ventricular response  Low voltage QRS  Nonspecific ST and T wave abnormality  Abnormal ECG  When compared with ECG of 14-JUN-2024 11:03,  Questionable change in QRS axis  Nonspecific T wave abnormality now evident in Inferior leads  Nonspecific T wave abnormality, worse in Anterolateral leads  Confirmed by Juventino Soto MD ([id number]) on 6/20/2024 12:52:09 PM (06-20-24 @ 11:37)      TRANSTHORACIC ECHOCARDIOGRAM REPORT  ________________________________________________________________________________                                      _______       Pt. Name:       SARAH MORRISON Study Date:    6/14/2024  MRN:            [id number]         YOB: 1970  Accession #:    [id number]        Age:           53 years  Account#:       [id number]       Gender:        M  Visit ID#  Heart Rate:                      Height:        72.05 in (183.00 cm)  Rhythm:          Weight:        222.66 lb (101.00 kg)  Blood Pressure: 88/53 mmHg       BSA/BMI:       2.23 m² / 30.16 kg/m²  ________________________________________________________________________________________  Referring Physician:    [id number] Hill Brizuela  Interpreting Physician: Rahel Boss MD  Primary Sonographer:    Mounika FELDMAN    CPT:               ECHO TTE WO CON COMP W DOPP - 17381.m  Indication(s):     Heart failure, unspecified - I50.9  Procedure:         Transthoracic echocardiogram with 2-D, M-mode and complete                     spectral and color flow Doppler.  Ordering Location: ICU1  Admission Status:  Inpatient    _______________________________________________________________________________________     CONCLUSIONS:      1. Left ventricular cavity is normal in size. Left ventricular systolic function is normal with an ejection fraction visually estimated at 55 to 60 %. There are no regional wall motion abnormalities seen.   2. Moderate to severe left ventricular hypertrophy.   3. Normal right ventricular cavity size, with normal wall thickness, and normal systolic function.   4. The left atrium is severely dilated.   5. The right atrium is severely dilated.   6. Trileaflet aortic valve with normal systolic excursion. There is focal calcification of the aortic valve leaflets.   7. Mild to moderate mitral regurgitation.   8. There is moderate calcification of the mitral valve annulus.   9. Structurally normal tricuspid valve with normal leaflet excursion.Mild tricuspid regurgitation.  10. The inferior vena cava is normal in size measuring 1.70 cm in diameter, (normal <2.1cm) with normal inspiratory collapse (normal >50%) consistent with normal right atrial pressure (~3, range 0-5mmHg).  11. Estimatedpulmonary artery systolic pressure is 43 mmHg.    ________________________________________________________________________________________  FINDINGS:     Left Ventricle:  The left ventricular cavity is normal in size. Left ventricular systolic function is normal with an ejection fraction visually estimated at 55 to 60%. There are no regional wall motion abnormalities seen. There is normal left ventricular diastolic function, with normal filling pressure. Moderate to severe left ventricular hypertrophy.     Right Ventricle:  The right ventricular cavity is normal in size, with normal wall thickness and normal systolic function.     Left Atrium:  The left atrium is severely dilated.     Right Atrium:  The right atrium is severely dilated.     Aortic Valve:  The aortic valve appears trileaflet with normal systolic excursion. There is focal calcification of the aortic valve leaflets. There is no aortic valve stenosis. There is no evidence of aortic regurgitation.     Mitral Valve:  There is moderate calcification of the mitral valve annulus. There is mild to moderate mitral regurgitation.     Tricuspid Valve:  Structurally normal tricuspid valve with normal leaflet excursion. There is mild tricuspid regurgitation. Estimated pulmonary artery systolic pressure is 43 mmHg.     Pulmonic Valve:  The pulmonic valve was not well visualized.     Systemic Veins:  The inferior vena cava is normal in size measuring 1.70 cm in diameter, (normal <2.1cm) with normal inspiratory collapse (normal >50%) consistent with normal right atrial pressure (~3, range 0-5mmHg).  ____________________________________________________________________  QUANTITATIVE DATA:  Left Ventricle Measurements: (Indexed to BSA)     IVSd (2D):   1.7 cm  LVPWd (2D):  1.4 cm  LVIDd (2D):  4.3 cm  LVIDs (2D):  3.0 cm  LV Mass:     281 g  125.9 g/m²  Visualized LV EF%: 55 to 60%     MV E Vmax:    1.07 m/s  MV A Vmax:    0.00 m/s  e' lateral:   9.68 cm/s  e' medial:    10.90 cm/s  E/e' lateral: 11.05  E/e' medial:  9.82  E/e' Average: 10.40  MV DT:        180 msec    Aorta Measurements: (Normal range) (Indexed to BSA)     Sinuses of Valsalva: 3.40 cm (3.1 - 3.7 cm)     Left Atrium Measurements: (Indexed to BSA)  LA Diam 2D: 4.00 c     LVOT / RVOT/ Qp/Qs Data: (Indexed to BSA)  LVOT Diameter: 2.10 cm  LVOT Area:     3.46 cm²    Mitral Valve Measurements:     MV E Vmax: 1.1 m/s  MV A Vmax: 0.0 m/s  Tricuspid Valve Measurements:     TR Vmax:          3.2 m/s  TR Peak Gradient: 40.4 mmHg  RA Pressure:      3 mmHg  PASP:             43 mmHg______________________________________________________________________________________  Electronically signed on 6/14/2024 at 12:08:59 PM by Rahel Boss MD  *** Final ***

## 2024-06-29 NOTE — PROGRESS NOTE ADULT - ASSESSMENT
54 YO M with  AF on Eliquis, indwelling Aguilera, CAD s/p stents, CVA, DMT 2, Hypertension, osteomyelitis s/p debridement, PE, transferred from [facility] for difficulty in breathing.  His recent admission was for osteomyelitis and discharged on IV Vancomycin. Reported at [facility] he was given a dose of ertapenem and developed difficulty breathing followed by apnea. On ED arrival he was unresponsive, apneic, no palpable pulse or blood pressure, EKG rhythm HR < 20/min W/O P waves. Patient intubated,     RECOMMENDATIONS  1-Asystolic cardiac arrest, Acute hypoxic respiratory failure, Shock  - unclear etiology  -rec avoiding carbapenems and fine with just Vanco  -ICU support and monitoring    2-Osteomyeliits right calcaneous  6/4 S/p Selective debridement of wound 04-Jun-2024 10:52:57  Parviz Todd  6/4  TCx MRSA, Escherichia coli ESBL  Right calcaneus bone pathology:  -   Fragments of bone with chronic osteomyelitis.  -   Focal acute osteomyelitis cannot be ruled out.  C/w vancomycin 750 mg IV q12, dosing per pharmacy protocol and plan had been with inability to rule out osteo rec 6 weeks so last day 7/18  PICC placed 6/7    3-Rash still has not seen dermatology so we have no diagnosis of chronic issue but has improved from recent acute blistering    From an ID standpoint no further requirement for inpatient status for the management of ID issues. Fine with discharge from ID standpoint when other medical issues no longer require inpatient care and social issues allow for a safe discharge plan. To schedule an outpatient ID follow up appointment please call our office at [phone]    Thank you for consulting us and involving us in the management of this most interesting and challenging case.  We will follow along in the care of this patient. Please call us at [phone] or text me directly on my cell# at [phone] with any concerns.

## 2024-06-29 NOTE — PROGRESS NOTE ADULT - SUBJECTIVE AND OBJECTIVE BOX
[facility] DIVISION of INFECTIOUS DISEASE  Juventino Whitten MD PhD, Symone Hickey MD, Anne-Marie Li MD, Jeffery Jacobs MD, Ryan Romeo MD  and providing coverage with Melody Armstrong MD  Providing Infectious Disease Consultations at [facility], [facility], [facility]    Office# [phone] to schedule follow up appointments  Answering Service for urgent calls or New Consults [phone]  Cell# to text for urgent issues Juventino Whitten [phone]     infectious diseases progress note:    SARAH MORRISON is a 54y y. o. Male patient    Overnight and events of the last 24hrs reviewed    Allergies    ertapenem (Blisters; Rash)  fish (Hives)    Intolerances        ANTIBIOTICS/RELEVANT:  antimicrobials  vancomycin  IVPB 750 milliGRAM(s) IV Intermittent every 12 hours    immunologic:    OTHER:  acetaminophen     Tablet .. 650 milliGRAM(s) Oral every 6 hours PRN  apixaban 5 milliGRAM(s) Oral two times a day  ascorbic acid 500 milliGRAM(s) Oral daily  aspirin enteric coated 81 milliGRAM(s) Oral daily  atorvastatin 40 milliGRAM(s) Oral at bedtime  benzocaine 20% Spray 1 Spray(s) Topical every 6 hours  calamine/zinc oxide Lotion 1 Application(s) Topical two times a day  chlorhexidine 2% Cloths 1 Application(s) Topical daily  cholecalciferol 1000 Unit(s) Oral daily  clobetasol 0.05% Cream 1 Application(s) Topical two times a day  dextrose 10% Bolus 125 milliLiter(s) IV Bolus once  dextrose 5%. 1000 milliLiter(s) IV Continuous <Continuous>  dextrose 5%. 1000 milliLiter(s) IV Continuous <Continuous>  dextrose 50% Injectable 25 Gram(s) IV Push once  dextrose 50% Injectable 12.5 Gram(s) IV Push once  diltiazem    Tablet 120 milliGRAM(s) Oral every 8 hours  diphenhydrAMINE 25 milliGRAM(s) Oral every 4 hours PRN  gabapentin 100 milliGRAM(s) Oral every 12 hours  glucagon  Injectable 1 milliGRAM(s) IntraMuscular once  hydrOXYzine hydrochloride 25 milliGRAM(s) Oral two times a day  insulin lispro (ADMELOG) corrective regimen sliding scale   SubCutaneous at bedtime  insulin lispro (ADMELOG) corrective regimen sliding scale   SubCutaneous three times a day before meals  melatonin 5 milliGRAM(s) Oral at bedtime  metFORMIN 1000 milliGRAM(s) Oral two times a day  metoprolol succinate  milliGRAM(s) Oral two times a day  metoprolol tartrate Injectable 5 milliGRAM(s) IV Push every 6 hours PRN  multivitamin 1 Tablet(s) Oral daily  naloxegol 25 milliGRAM(s) Oral daily  ondansetron Injectable 4 milliGRAM(s) IV Push every 6 hours PRN  oxybutynin XL 10 milliGRAM(s) Oral daily  oxyCODONE    IR 10 milliGRAM(s) Oral every 6 hours PRN  oxyCODONE    IR 5 milliGRAM(s) Oral every 6 hours PRN  pantoprazole    Tablet 40 milliGRAM(s) Oral two times a day  polyethylene glycol 3350 17 Gram(s) Oral daily  potassium chloride    Tablet ER 10 milliEquivalent(s) Oral daily  predniSONE   Tablet 5 milliGRAM(s) Oral daily  senna 2 Tablet(s) Oral at bedtime  sodium chloride 0.65% Nasal 1 Spray(s) Both Nostrils three times a day PRN  sodium chloride 0.9% lock flush 10 milliLiter(s) IV Push every 1 hour PRN  sucralfate 1 Gram(s) Oral two times a day  tiotropium 2.5 MICROgram(s)/olodaterol 2.5 MICROgram(s) (STIOLTO) Inhaler 2 Puff(s) Inhalation daily      Objective:  Vital Signs Last 24 Hrs  T(C): 36.6 (29 Jun 2024 11:49), Max: 36.9 (28 Jun 2024 21:15)  T(F): 97.9 (29 Jun 2024 11:49), Max: 98.5 (28 Jun 2024 21:15)  HR: 76 (29 Jun 2024 11:49) (68 - 107)  BP: 116/68 (29 Jun 2024 11:49) (102/68 - 126/78)  BP(mean): --  RR: 18 (29 Jun 2024 11:49) (17 - 19)  SpO2: 94% (29 Jun 2024 11:49) (94% - 98%)    Parameters below as of 29 Jun 2024 11:49  Patient On (Oxygen Delivery Method): room air        T(C): 36.6 (06-29-24 @ 11:49), Max: 36.9 (06-28-24 @ 21:15)  T(C): 36.6 (06-29-24 @ 11:49), Max: 36.9 (06-28-24 @ 21:15)  T(C): 36.6 (06-29-24 @ 11:49), Max: 37.1 (06-25-24 @ 20:44)    PHYSICAL EXAM:  HEENT: NC atraumatic  Neck: supple  Respiratory: no accessory muscle use, breathing comfortably  Cardiovascular: distant  Gastrointestinal: normal appearing, nondistended  Extremities: no clubbing, no cyanosis,        LABS:                          11.0   10.71 )-----------( 328      ( 28 Jun 2024 09:45 )             35.1       WBC  10.71 06-28 @ 09:45  10.40 06-27 @ 06:22  11.48 06-26 @ 18:10  9.99 06-24 @ 12:40  6.63 06-22 @ 14:08      06-28    138  |  104  |  15  ----------------------------<  235<H>  4.1   |  29  |  1.30    Ca    8.7      28 Jun 2024 09:45    TPro  6.0  /  Alb  2.2<L>  /  TBili  0.3  /  DBili  x   /  AST  17  /  ALT  30  /  AlkPhos  87  06-28      Creatinine: 1.30 mg/dL (06-28-24 @ 09:45)  Creatinine: 1.10 mg/dL (06-27-24 @ 06:22)  Creatinine: 1.40 mg/dL (06-26-24 @ 18:10)  Creatinine: 1.20 mg/dL (06-24-24 @ 12:40)  Creatinine: 1.10 mg/dL (06-22-24 @ 14:08)        Urinalysis Basic - ( 28 Jun 2024 09:45 )    Color: x / Appearance: x / SG: x / pH: x  Gluc: 235 mg/dL / Ketone: x  / Bili: x / Urobili: x   Blood: x / Protein: x / Nitrite: x   Leuk Esterase: x / RBC: x / WBC x   Sq Epi: x / Non Sq Epi: x / Bacteria: x            INFLAMMATORY MARKERS      MICROBIOLOGY:              RADIOLOGY & ADDITIONAL STUDIES:

## 2024-06-29 NOTE — PROGRESS NOTE ADULT - SUBJECTIVE AND OBJECTIVE BOX
Date of Service 06-29-24 @ 17:07    Patient is a 54y old  Male who presents with a chief complaint of Acute respiratory failure with hypoxia (29 Jun 2024 12:42)      INTERVAL /OVERNIGHT EVENTS: feeling ok,     MEDICATIONS  (STANDING):  apixaban 5 milliGRAM(s) Oral two times a day  ascorbic acid 500 milliGRAM(s) Oral daily  aspirin enteric coated 81 milliGRAM(s) Oral daily  atorvastatin 40 milliGRAM(s) Oral at bedtime  benzocaine 20% Spray 1 Spray(s) Topical every 6 hours  calamine/zinc oxide Lotion 1 Application(s) Topical two times a day  chlorhexidine 2% Cloths 1 Application(s) Topical daily  cholecalciferol 1000 Unit(s) Oral daily  clobetasol 0.05% Cream 1 Application(s) Topical two times a day  dextrose 10% Bolus 125 milliLiter(s) IV Bolus once  dextrose 5%. 1000 milliLiter(s) (50 mL/Hr) IV Continuous <Continuous>  dextrose 5%. 1000 milliLiter(s) (100 mL/Hr) IV Continuous <Continuous>  dextrose 50% Injectable 12.5 Gram(s) IV Push once  dextrose 50% Injectable 25 Gram(s) IV Push once  diltiazem    Tablet 120 milliGRAM(s) Oral every 8 hours  furosemide    Tablet 20 milliGRAM(s) Oral daily  gabapentin 100 milliGRAM(s) Oral every 12 hours  glucagon  Injectable 1 milliGRAM(s) IntraMuscular once  hydrOXYzine hydrochloride 25 milliGRAM(s) Oral two times a day  insulin lispro (ADMELOG) corrective regimen sliding scale   SubCutaneous three times a day before meals  insulin lispro (ADMELOG) corrective regimen sliding scale   SubCutaneous at bedtime  melatonin 5 milliGRAM(s) Oral at bedtime  metFORMIN 1000 milliGRAM(s) Oral two times a day  metoprolol succinate  milliGRAM(s) Oral two times a day  multivitamin 1 Tablet(s) Oral daily  naloxegol 25 milliGRAM(s) Oral daily  oxybutynin XL 10 milliGRAM(s) Oral daily  pantoprazole    Tablet 40 milliGRAM(s) Oral two times a day  polyethylene glycol 3350 17 Gram(s) Oral daily  potassium chloride    Tablet ER 10 milliEquivalent(s) Oral daily  predniSONE   Tablet 5 milliGRAM(s) Oral daily  senna 2 Tablet(s) Oral at bedtime  sucralfate 1 Gram(s) Oral two times a day  tiotropium 2.5 MICROgram(s)/olodaterol 2.5 MICROgram(s) (STIOLTO) Inhaler 2 Puff(s) Inhalation daily  vancomycin  IVPB 750 milliGRAM(s) IV Intermittent every 12 hours    MEDICATIONS  (PRN):  acetaminophen     Tablet .. 650 milliGRAM(s) Oral every 6 hours PRN Mild Pain (1 - 3)  diphenhydrAMINE 25 milliGRAM(s) Oral every 4 hours PRN Rash and/or Itching  metoprolol tartrate Injectable 5 milliGRAM(s) IV Push every 6 hours PRN HR >140  ondansetron Injectable 4 milliGRAM(s) IV Push every 6 hours PRN Nausea and/or Vomiting  oxyCODONE    IR 10 milliGRAM(s) Oral every 6 hours PRN Severe Pain (7 - 10)  oxyCODONE    IR 5 milliGRAM(s) Oral every 6 hours PRN Moderate Pain (4 - 6)  sodium chloride 0.65% Nasal 1 Spray(s) Both Nostrils three times a day PRN Nasal Congestion  sodium chloride 0.9% lock flush 10 milliLiter(s) IV Push every 1 hour PRN Pre/post blood products, medications, blood draw, and to maintain line patency      Allergies    ertapenem (Blisters; Rash)  fish (Hives)    Intolerances        REVIEW OF SYSTEMS:  CONSTITUTIONAL: + fatigue  EYES: No eye pain, visual disturbances, or discharge  ENMT:  No difficulty hearing, tinnitus, vertigo; No sinus or throat pain  NECK: No pain or stiffness  RESPIRATORY: No cough, wheezing, chills or hemoptysis; No shortness of breath  CARDIOVASCULAR: No chest pain, palpitations, dizziness, or leg swelling  GASTROINTESTINAL: No abdominal or epigastric pain. No nausea, vomiting, or hematemesis; No diarrhea or constipation. No melena or hematochezia.  GENITOURINARY: No dysuria, frequency, hematuria, or incontinence  NEUROLOGICAL: No headaches, memory loss, loss of strength, numbness, or tremors  SKIN: + rashes   LYMPH NODES: No enlarged glands  ENDOCRINE: No heat or cold intolerance; No hair loss; No polydipsia or polyuria  MUSCULOSKELETAL: No joint pain or swelling; No muscle, back, or extremity pain  PSYCHIATRIC: No depression, anxiety, mood swings, or difficulty sleeping  HEME/LYMPH: No easy bruising, or bleeding gums  ALLERGY AND IMMUNOLOGIC: No hives or eczema    Vital Signs Last 24 Hrs  T(C): 36.6 (29 Jun 2024 11:49), Max: 36.9 (28 Jun 2024 21:15)  T(F): 97.9 (29 Jun 2024 11:49), Max: 98.5 (28 Jun 2024 21:15)  HR: 76 (29 Jun 2024 11:49) (68 - 100)  BP: 116/68 (29 Jun 2024 11:49) (102/68 - 126/78)  BP(mean): --  RR: 18 (29 Jun 2024 11:49) (17 - 19)  SpO2: 94% (29 Jun 2024 11:49) (94% - 96%)    Parameters below as of 29 Jun 2024 11:49  Patient On (Oxygen Delivery Method): room air        PHYSICAL EXAM:  GENERAL: NAD, well-groomed, well-developed  HEAD:  Atraumatic, Normocephalic  EYES: EOMI, PERRLA, conjunctiva and sclera clear  ENMT: No tonsillar erythema, exudates, or enlargement; Moist mucous membranes, Good dentition, No lesions  NECK: Supple, No JVD, Normal thyroid  NERVOUS SYSTEM:  Alert & Oriented X3, Good concentration; Motor Strength 5/5 B/L upper and lower extremities; DTRs 2+ intact and symmetric  CHEST/LUNG: Clear to auscultation bilaterally; No rales, rhonchi, wheezing, or rubs  HEART: Regular rate and rhythm; No murmurs, rubs, or gallops  ABDOMEN: Soft, Nontender, Nondistended; Bowel sounds present  EXTREMITIES:  2+ Peripheral Pulses, No clubbing, cyanosis, or edema  LYMPH: No lymphadenopathy noted  SKIN: No rashes or lesions    LABS:      Ca    8.7        28 Jun 2024 09:45        Urinalysis Basic - ( 28 Jun 2024 09:45 )    Color: x / Appearance: x / SG: x / pH: x  Gluc: 235 mg/dL / Ketone: x  / Bili: x / Urobili: x   Blood: x / Protein: x / Nitrite: x   Leuk Esterase: x / RBC: x / WBC x   Sq Epi: x / Non Sq Epi: x / Bacteria: x      CAPILLARY BLOOD GLUCOSE      POCT Blood Glucose.: 149 mg/dL (29 Jun 2024 17:01)  POCT Blood Glucose.: 189 mg/dL (29 Jun 2024 11:55)  POCT Blood Glucose.: 137 mg/dL (29 Jun 2024 07:54)  POCT Blood Glucose.: 146 mg/dL (28 Jun 2024 21:08)      RADIOLOGY & ADDITIONAL TESTS:    Notes Reviewed:  [x ] YES  [ ] NO    Care Discussed with Consultants/Other Providers [x ] YES  [ ] NO

## 2024-06-29 NOTE — PROGRESS NOTE ADULT - ASSESSMENT
54 YO M with past medical history of AFib (on Eliquis), indwelling Aguilera, cerebral aneurysm, CAD (s/p stents), CVA, T2DM, HTN, OM (s/p debridement), PE, perforated gastric ulcer s/p ometnopexy 2019 with Dr. Mejia, transferred from [facility] for difficulty in breathing.      CAD, Afib, s/p Cardiac Arrest, Mod-Severe MR, HTN  - s/p cardiac arrest x2 w/ AHRF.  s/p intubation with successful extubation.   - Still tolerating RA    - Trops + but no sig trend. No clear evidence of acute ischemia  - Continue ASA and statin      - Known AFib with controlled rates overnight.  Had intermittent mild tachy due to missed doses today in the setting of soft BP  - Continue Cardizem and BB with more lenient parameter. Would switch to Metoprolol Succinate 100mg BID. Would allow HR of 100s-110  - Would eventually switch CCB to long-acting  - Continue Eliquis    - TTE normal EF and mod-severe MR  - No sign of volume overload on exam  - Hold Lasix 20mg po qd for now to allow room for AVN blockers (ordered)    - ATN with creat normalized  - With diffuse rashes +pruritus.  Recommend Derma.  ID following  - Monitor and replete lytes, keep K>4, Mg>2.  - Will continue to follow.    Henrietta Kim DNP, NP-C, AGACNP-C  Cardiology   Call TEAMS   54 YO M with past medical history of AFib (on Eliquis), indwelling Aguilera, cerebral aneurysm, CAD (s/p stents), CVA, T2DM, HTN, OM (s/p debridement), PE, perforated gastric ulcer s/p ometnopexy 2019 with Dr. Mejia, transferred from [facility] for difficulty in breathing.      CAD, Afib, s/p Cardiac Arrest, Mod-Severe MR, HTN  - s/p cardiac arrest x2 w/ AHRF.  s/p intubation with successful extubation.   - Still tolerating RA    - Trops + but no sig trend. No clear evidence of acute ischemia  - Continue ASA and statin      - Known AFib with controlled rates overnight.  Had intermittent mild tachy due to missed doses today in the setting of soft BP  - Continue Cardizem and BB with more lenient parameter. Would switch to Metoprolol Succinate 100mg BID. Would allow HR of 100s-110  - Would eventually switch CCB to long-acting  - Continue Eliquis    - TTE normal EF and mod-severe MR  - No sign of volume overload on exam  - Resume home Lasix 20mg PO daily    - ATN with creat normalized  - With diffuse rashes +pruritus.  Recommend Derma.  ID following  - Monitor and replete lytes, keep K>4, Mg>2.  - Will continue to follow.    Henrietta Kim DNP, NP-C, AGACNP-C  Cardiology   Call TEAMS

## 2024-06-29 NOTE — PHARMACOTHERAPY INTERVENTION NOTE - COMMENTS
Vancomycin Dosing Per Pharmacy:  Patient is a 52 y/o male being treated for R heel wound/osteomyelitis on vancomycin 750mg q12h, current . Level 6/29 in AM = 36.4 is not a true trough, likely taken during the vancomycin infusion. Will repeat and follow trough 6/30 at 07:00.     1. Indication for the vancomycin: R heel wound/osteomyelitis  2. Requesting Provider: Dr. Whitten  3. Current plan and dosing regimen: [id number]   4. Day of therapy: 25  5. Cultures: Tissue Cx 5/26/24: E faecalis + MRSA. Tissue Cx 6/4/24: ESBL E coli + E faecalis. R heel surgical swab 6/14/24: MRSA + citrobacter freundii + pseudomonas stutzeri.  6. Target trough or AUC/BAYLEE: 400-600  7. Day and time level is due: 06/30 0700  8. Previous levels: 6/4 (19:31): 22, 6/5 (05:47): 14, 6/6 (20:20): 14, 6/9 (04:58): 14.4, 6/11 (04:30): 15.7, 6/13 (05:54): 17.3, 6/14 (01:25): 15.2, 6/14 (04:30): 16.1, 6/15 (5:40): 17.5, 6/16 (5:38): 15.3, 6/17 (05:52): 13.2, 6/18 (05:50): 11.8, 6/19 (06:15): 21.3, 6/20 (08:52): 21.6, 6/21 (09:30): 37.5, 06/22 (0945): 11.1, 06/23 (09:30): 15.9, 06/24 (08:00): 19.6, 06/27 (21:47): 18.2, 6/29 (10:05): 36.4 (taken during infusion)  9. Expected 24-hour AUC: 540

## 2024-06-30 LAB
ALBUMIN SERPL ELPH-MCNC: 2.4 G/DL — LOW (ref 3.3–5)
ALP SERPL-CCNC: 96 U/L — SIGNIFICANT CHANGE UP (ref 40–120)
ALT FLD-CCNC: 23 U/L — SIGNIFICANT CHANGE UP (ref 12–78)
ANION GAP SERPL CALC-SCNC: 8 MMOL/L — SIGNIFICANT CHANGE UP (ref 5–17)
AST SERPL-CCNC: 14 U/L — LOW (ref 15–37)
BILIRUB SERPL-MCNC: 0.5 MG/DL — SIGNIFICANT CHANGE UP (ref 0.2–1.2)
BUN SERPL-MCNC: 11 MG/DL — SIGNIFICANT CHANGE UP (ref 7–23)
CALCIUM SERPL-MCNC: 8.8 MG/DL — SIGNIFICANT CHANGE UP (ref 8.5–10.1)
CHLORIDE SERPL-SCNC: 106 MMOL/L — SIGNIFICANT CHANGE UP (ref 96–108)
CO2 SERPL-SCNC: 27 MMOL/L — SIGNIFICANT CHANGE UP (ref 22–31)
CREAT SERPL-MCNC: 1.2 MG/DL — SIGNIFICANT CHANGE UP (ref 0.5–1.3)
EGFR: 71 ML/MIN/1.73M2 — SIGNIFICANT CHANGE UP
GLUCOSE BLDC GLUCOMTR-MCNC: 144 MG/DL — HIGH (ref 70–99)
GLUCOSE BLDC GLUCOMTR-MCNC: 144 MG/DL — HIGH (ref 70–99)
GLUCOSE BLDC GLUCOMTR-MCNC: 153 MG/DL — HIGH (ref 70–99)
GLUCOSE BLDC GLUCOMTR-MCNC: 183 MG/DL — HIGH (ref 70–99)
GLUCOSE SERPL-MCNC: 155 MG/DL — HIGH (ref 70–99)
HCT VFR BLD CALC: 37.4 % — LOW (ref 39–50)
HGB BLD-MCNC: 11.7 G/DL — LOW (ref 13–17)
MCHC RBC-ENTMCNC: 29.5 PG — SIGNIFICANT CHANGE UP (ref 27–34)
MCHC RBC-ENTMCNC: 31.3 GM/DL — LOW (ref 32–36)
MCV RBC AUTO: 94.2 FL — SIGNIFICANT CHANGE UP (ref 80–100)
NRBC # BLD: 0 /100 WBCS — SIGNIFICANT CHANGE UP (ref 0–0)
PLATELET # BLD AUTO: 335 K/UL — SIGNIFICANT CHANGE UP (ref 150–400)
POTASSIUM SERPL-MCNC: 3.3 MMOL/L — LOW (ref 3.5–5.3)
POTASSIUM SERPL-SCNC: 3.3 MMOL/L — LOW (ref 3.5–5.3)
PROT SERPL-MCNC: 5.8 G/DL — LOW (ref 6–8.3)
RBC # BLD: 3.97 M/UL — LOW (ref 4.2–5.8)
RBC # FLD: 14.4 % — SIGNIFICANT CHANGE UP (ref 10.3–14.5)
SODIUM SERPL-SCNC: 141 MMOL/L — SIGNIFICANT CHANGE UP (ref 135–145)
VANCOMYCIN TROUGH SERPL-MCNC: 20.3 UG/ML — HIGH (ref 10–20)
WBC # BLD: 11.9 K/UL — HIGH (ref 3.8–10.5)
WBC # FLD AUTO: 11.9 K/UL — HIGH (ref 3.8–10.5)

## 2024-06-30 RX ORDER — VANCOMYCIN HYDROCHLORIDE 50 MG/ML
1250 KIT ORAL EVERY 24 HOURS
Refills: 0 | Status: DISCONTINUED | OUTPATIENT
Start: 2024-07-01 | End: 2024-07-05

## 2024-06-30 RX ORDER — POTASSIUM CHLORIDE 600 MG/1
40 TABLET, FILM COATED, EXTENDED RELEASE ORAL ONCE
Refills: 0 | Status: COMPLETED | OUTPATIENT
Start: 2024-06-30 | End: 2024-06-30

## 2024-06-30 RX ADMIN — NALOXEGOL OXALATE 25 MILLIGRAM(S): 25 TABLET, FILM COATED ORAL at 11:26

## 2024-06-30 RX ADMIN — INSULIN LISPRO 2: 100 INJECTION, SOLUTION SUBCUTANEOUS at 08:29

## 2024-06-30 RX ADMIN — Medication 500 MILLIGRAM(S): at 11:04

## 2024-06-30 RX ADMIN — Medication 1 APPLICATION(S): at 11:04

## 2024-06-30 RX ADMIN — Medication 25 MILLIGRAM(S): at 11:25

## 2024-06-30 RX ADMIN — APIXABAN 5 MILLIGRAM(S): 5 TABLET, FILM COATED ORAL at 17:58

## 2024-06-30 RX ADMIN — Medication 10 MILLIGRAM(S): at 11:03

## 2024-06-30 RX ADMIN — Medication 100 MILLIGRAM(S): at 17:59

## 2024-06-30 RX ADMIN — OXYCODONE HYDROCHLORIDE 10 MILLIGRAM(S): 100 SOLUTION ORAL at 21:46

## 2024-06-30 RX ADMIN — Medication 1000 UNIT(S): at 11:03

## 2024-06-30 RX ADMIN — VANCOMYCIN HYDROCHLORIDE 250 MILLIGRAM(S): KIT at 11:02

## 2024-06-30 RX ADMIN — POLYETHYLENE GLYCOL 3350 17 GRAM(S): 1 POWDER ORAL at 11:03

## 2024-06-30 RX ADMIN — Medication 25 MILLIGRAM(S): at 21:46

## 2024-06-30 RX ADMIN — Medication 1 TABLET(S): at 11:03

## 2024-06-30 RX ADMIN — Medication 5 MILLIGRAM(S): at 21:43

## 2024-06-30 RX ADMIN — Medication 2 TABLET(S): at 21:43

## 2024-06-30 RX ADMIN — Medication 100 MILLIGRAM(S): at 05:40

## 2024-06-30 RX ADMIN — TIOTROPIUM BROMIDE AND OLODATEROL 2 PUFF(S): 3.124; 2.736 SPRAY, METERED RESPIRATORY (INHALATION) at 05:46

## 2024-06-30 RX ADMIN — OXYCODONE HYDROCHLORIDE 10 MILLIGRAM(S): 100 SOLUTION ORAL at 12:26

## 2024-06-30 RX ADMIN — OXYCODONE HYDROCHLORIDE 10 MILLIGRAM(S): 100 SOLUTION ORAL at 22:40

## 2024-06-30 RX ADMIN — OXYCODONE HYDROCHLORIDE 10 MILLIGRAM(S): 100 SOLUTION ORAL at 11:26

## 2024-06-30 RX ADMIN — Medication 1 APPLICATION(S): at 05:41

## 2024-06-30 RX ADMIN — INSULIN LISPRO 2: 100 INJECTION, SOLUTION SUBCUTANEOUS at 18:01

## 2024-06-30 RX ADMIN — HYDROXYZINE PAMOATE 25 MILLIGRAM(S): 50 CAPSULE ORAL at 18:41

## 2024-06-30 RX ADMIN — DILTIAZEM HYDROCHLORIDE 120 MILLIGRAM(S): 240 CAPSULE, EXTENDED RELEASE ORAL at 21:44

## 2024-06-30 RX ADMIN — APIXABAN 5 MILLIGRAM(S): 5 TABLET, FILM COATED ORAL at 05:40

## 2024-06-30 RX ADMIN — DILTIAZEM HYDROCHLORIDE 120 MILLIGRAM(S): 240 CAPSULE, EXTENDED RELEASE ORAL at 05:40

## 2024-06-30 RX ADMIN — Medication 1 APPLICATION(S): at 18:59

## 2024-06-30 RX ADMIN — ATORVASTATIN CALCIUM 40 MILLIGRAM(S): 20 TABLET, FILM COATED ORAL at 21:43

## 2024-06-30 RX ADMIN — METFORMIN HYDROCHLORIDE 1000 MILLIGRAM(S): 850 TABLET, FILM COATED ORAL at 05:40

## 2024-06-30 RX ADMIN — POTASSIUM CHLORIDE 10 MILLIEQUIVALENT(S): 600 TABLET, FILM COATED, EXTENDED RELEASE ORAL at 11:03

## 2024-06-30 RX ADMIN — HYDROXYZINE PAMOATE 25 MILLIGRAM(S): 50 CAPSULE ORAL at 05:40

## 2024-06-30 RX ADMIN — PANTOPRAZOLE SODIUM 40 MILLIGRAM(S): 40 INJECTION, POWDER, FOR SOLUTION INTRAVENOUS at 05:40

## 2024-06-30 RX ADMIN — Medication 1 GRAM(S): at 17:58

## 2024-06-30 RX ADMIN — METFORMIN HYDROCHLORIDE 1000 MILLIGRAM(S): 850 TABLET, FILM COATED ORAL at 18:00

## 2024-06-30 RX ADMIN — CLOBETASOL PROPIONATE 1 APPLICATION(S): 0.5 CREAM TOPICAL at 18:59

## 2024-06-30 RX ADMIN — PANTOPRAZOLE SODIUM 40 MILLIGRAM(S): 40 INJECTION, POWDER, FOR SOLUTION INTRAVENOUS at 17:59

## 2024-06-30 RX ADMIN — POTASSIUM CHLORIDE 40 MILLIEQUIVALENT(S): 600 TABLET, FILM COATED, EXTENDED RELEASE ORAL at 15:29

## 2024-06-30 RX ADMIN — Medication 1 GRAM(S): at 05:41

## 2024-06-30 RX ADMIN — ASPIRIN 81 MILLIGRAM(S): 325 TABLET, FILM COATED ORAL at 11:03

## 2024-06-30 RX ADMIN — FUROSEMIDE 20 MILLIGRAM(S): 10 INJECTION, SOLUTION INTRAMUSCULAR; INTRAVENOUS at 05:41

## 2024-06-30 RX ADMIN — PREDNISONE 5 MILLIGRAM(S): 10 TABLET ORAL at 05:40

## 2024-06-30 NOTE — PROGRESS NOTE ADULT - ASSESSMENT
Claude Villareal is a 52 YO M with past medical history of  AF on Eliquis, indwelling Aguilera, CAD s/p stents, CVA, DMT 2, Hypertension, osteomyelitis s/p debridement, PE, transferred from [facility] for difficulty in breathing.  His recent admission was for osteomyelitis and discharged on IV Vancomycin. On ED arrival he is unresponsive, apneic, no palpable pulse or blood pressure, EKG rhythm HR < 20/min W/O P waves. Patient intubated, Code ACLS activated, IV epinephrine, IV atropine administered, pulse became palpable, still Hypotensive, IV levophed administered. Admitted to ICU consulted,  Patient woke up and agitated try to pull out ETT,  IV propofol started,

## 2024-06-30 NOTE — PHARMACOTHERAPY INTERVENTION NOTE - COMMENTS
Vancomycin Dosing Per Pharmacy:    Patient is a 54 y/o male being treated for R heel wound/osteomyelitis on vancomycin 750mg q12h, current  hr*ug/mL. Level 6/30 in AM = 20.3 ug/mL. Per [facility], vancomycin t1/2 in this patient = 19.86 hours, may benefit from Q24H dosing. Recommend vancomycin 1250 mg IV Q24H, estimated AUC = 482 hr*ug/mL    1. Indication for the vancomycin: R heel wound/osteomyelitis  2. Requesting Provider: Dr. Whitten  3. Current plan and dosing regimen: 1250 mg Q24H  4. Day of therapy: 26  5. Cultures: Tissue Cx 5/26/24: E faecalis + MRSA. Tissue Cx 6/4/24: ESBL E coli + E faecalis. R heel surgical swab 6/14/24: MRSA + citrobacter freundii + pseudomonas stutzeri.  6. Target trough or AUC/BAYLEE: 400-600 hr*ug/mL  7. Day and time level is due: 7/3 05:00  8. Previous levels: 6/4 (19:31): 22, 6/5 (05:47): 14, 6/6 (20:20): 14, 6/9 (04:58): 14.4, 6/11 (04:30): 15.7, 6/13 (05:54): 17.3, 6/14 (01:25): 15.2, 6/14 (04:30): 16.1, 6/15 (5:40): 17.5, 6/16 (5:38): 15.3, 6/17 (05:52): 13.2, 6/18 (05:50): 11.8, 6/19 (06:15): 21.3, 6/20 (08:52): 21.6, 6/21 (09:30): 37.5, 06/22 (0945): 11.1, 06/23 (09:30): 15.9, 06/24 (08:00): 19.6, 06/27 (21:47): 18.2, 6/29 (10:05): 36.4 (taken during infusion), 6/30 (07:10): 20.3  9. Expected 24-hour AUC: 482 hr*ug/mL    Sabra Ricci, PharmD  Clinical Pharmacy Specialist   [phone] or Teams

## 2024-06-30 NOTE — SOCIAL WORK PROGRESS NOTE - NSSWPROGRESSNOTE_GEN_ALL_CORE
Social Work Consult received to prepare for discharge -- per review of long-term care referral, updated clinical documentation & SHAWNA have been sent to [facility] via electronic referral system, and patient was accepted back w/ a resumption of care date/time for whenever he is medically cleared to leave the hospital. Consult marked complete, social work team to continue to follow & will arrange after-care accordingly.

## 2024-06-30 NOTE — PROGRESS NOTE ADULT - SUBJECTIVE AND OBJECTIVE BOX
[facility]FECTIOUS DISEASE  Juventino Whitten MD PhD, Symone Hickey MD, Anne-Marie Li MD, Jeffery Jacobs MD, Ryan Romeo MD  and providing coverage with Melody Armstrong MD  Providing Infectious Disease Consultations at [facility], [facility], [facility]'s    Office# [phone] to schedule follow up appointments  Answering Service for urgent calls or New Consults [phone]  Cell# to text for urgent issues Juventino Whitten [phone]     infectious diseases progress note:    SARAH MORRISON is a 54y y. o. Male patient    Overnight and events of the last 24hrs reviewed    Allergies    ertapenem (Blisters; Rash)  fish (Hives)    Intolerances        ANTIBIOTICS/RELEVANT:  antimicrobials  vancomycin  IVPB 750 milliGRAM(s) IV Intermittent every 12 hours    immunologic:    OTHER:  acetaminophen     Tablet .. 650 milliGRAM(s) Oral every 6 hours PRN  apixaban 5 milliGRAM(s) Oral two times a day  ascorbic acid 500 milliGRAM(s) Oral daily  aspirin enteric coated 81 milliGRAM(s) Oral daily  atorvastatin 40 milliGRAM(s) Oral at bedtime  benzocaine 20% Spray 1 Spray(s) Topical every 6 hours  calamine/zinc oxide Lotion 1 Application(s) Topical two times a day  chlorhexidine 2% Cloths 1 Application(s) Topical daily  cholecalciferol 1000 Unit(s) Oral daily  clobetasol 0.05% Cream 1 Application(s) Topical two times a day  dextrose 10% Bolus 125 milliLiter(s) IV Bolus once  dextrose 5%. 1000 milliLiter(s) IV Continuous <Continuous>  dextrose 5%. 1000 milliLiter(s) IV Continuous <Continuous>  dextrose 50% Injectable 25 Gram(s) IV Push once  dextrose 50% Injectable 12.5 Gram(s) IV Push once  diltiazem    Tablet 120 milliGRAM(s) Oral every 8 hours  diphenhydrAMINE 25 milliGRAM(s) Oral every 4 hours PRN  furosemide    Tablet 20 milliGRAM(s) Oral daily  gabapentin 100 milliGRAM(s) Oral every 12 hours  glucagon  Injectable 1 milliGRAM(s) IntraMuscular once  hydrOXYzine hydrochloride 25 milliGRAM(s) Oral two times a day  insulin lispro (ADMELOG) corrective regimen sliding scale   SubCutaneous at bedtime  insulin lispro (ADMELOG) corrective regimen sliding scale   SubCutaneous three times a day before meals  melatonin 5 milliGRAM(s) Oral at bedtime  metFORMIN 1000 milliGRAM(s) Oral two times a day  metoprolol succinate  milliGRAM(s) Oral two times a day  metoprolol tartrate Injectable 5 milliGRAM(s) IV Push every 6 hours PRN  multivitamin 1 Tablet(s) Oral daily  naloxegol 25 milliGRAM(s) Oral daily  ondansetron Injectable 4 milliGRAM(s) IV Push every 6 hours PRN  oxybutynin XL 10 milliGRAM(s) Oral daily  oxyCODONE    IR 5 milliGRAM(s) Oral every 6 hours PRN  oxyCODONE    IR 10 milliGRAM(s) Oral every 6 hours PRN  pantoprazole    Tablet 40 milliGRAM(s) Oral two times a day  polyethylene glycol 3350 17 Gram(s) Oral daily  potassium chloride    Tablet ER 10 milliEquivalent(s) Oral daily  predniSONE   Tablet 5 milliGRAM(s) Oral daily  senna 2 Tablet(s) Oral at bedtime  sodium chloride 0.65% Nasal 1 Spray(s) Both Nostrils three times a day PRN  sodium chloride 0.9% lock flush 10 milliLiter(s) IV Push every 1 hour PRN  sucralfate 1 Gram(s) Oral two times a day  tiotropium 2.5 MICROgram(s)/olodaterol 2.5 MICROgram(s) (STIOLTO) Inhaler 2 Puff(s) Inhalation daily      Objective:  Vital Signs Last 24 Hrs  T(C): 36.9 (30 Jun 2024 05:29), Max: 36.9 (30 Jun 2024 05:29)  T(F): 98.5 (30 Jun 2024 05:29), Max: 98.5 (30 Jun 2024 05:29)  HR: 70 (30 Jun 2024 11:33) (70 - 100)  BP: 118/84 (30 Jun 2024 11:33) (114/75 - 119/76)  BP(mean): --  RR: 19 (30 Jun 2024 11:33) (17 - 19)  SpO2: 97% (30 Jun 2024 11:33) (95% - 97%)    Parameters below as of 30 Jun 2024 11:33  Patient On (Oxygen Delivery Method): room air        T(C): 36.9 (06-30-24 @ 05:29), Max: 36.9 (06-28-24 @ 21:15)  T(C): 36.9 (06-30-24 @ 05:29), Max: 36.9 (06-28-24 @ 21:15)  T(C): 36.9 (06-30-24 @ 05:29), Max: 36.9 (06-28-24 @ 21:15)    PHYSICAL EXAM:  HEENT: NC atraumatic  Neck: supple  Respiratory: no accessory muscle use, breathing comfortably  Cardiovascular: distant  Gastrointestinal: normal appearing, nondistended  Extremities: no clubbing, no cyanosis,        LABS:                          11.7   11.90 )-----------( 335      ( 30 Jun 2024 07:10 )             37.4       WBC  11.90 06-30 @ 07:10  10.71 06-28 @ 09:45  10.40 06-27 @ 06:22  11.48 06-26 @ 18:10  9.99 06-24 @ 12:40      06-30    141  |  106  |  11  ----------------------------<  155<H>  3.3<L>   |  27  |  1.20    Ca    8.8      30 Jun 2024 07:10    TPro  5.8<L>  /  Alb  2.4<L>  /  TBili  0.5  /  DBili  x   /  AST  14<L>  /  ALT  23  /  AlkPhos  96  06-30      Creatinine: 1.20 mg/dL (06-30-24 @ 07:10)  Creatinine: 1.30 mg/dL (06-28-24 @ 09:45)  Creatinine: 1.10 mg/dL (06-27-24 @ 06:22)  Creatinine: 1.40 mg/dL (06-26-24 @ 18:10)  Creatinine: 1.20 mg/dL (06-24-24 @ 12:40)        Urinalysis Basic - ( 30 Jun 2024 07:10 )    Color: x / Appearance: x / SG: x / pH: x  Gluc: 155 mg/dL / Ketone: x  / Bili: x / Urobili: x   Blood: x / Protein: x / Nitrite: x   Leuk Esterase: x / RBC: x / WBC x   Sq Epi: x / Non Sq Epi: x / Bacteria: x            INFLAMMATORY MARKERS      MICROBIOLOGY:              RADIOLOGY & ADDITIONAL STUDIES:

## 2024-06-30 NOTE — PROGRESS NOTE ADULT - ASSESSMENT
54 YO M with  AF on Eliquis, indwelling Aguilera, CAD s/p stents, CVA, DMT 2, Hypertension, osteomyelitis s/p debridement, PE, transferred from [facility] for difficulty in breathing.  His recent admission was for osteomyelitis and discharged on IV Vancomycin. Reported at [facility] he was given a dose of ertapenem and developed difficulty breathing followed by apnea. On ED arrival he was unresponsive, apneic, no palpable pulse or blood pressure, EKG rhythm HR < 20/min W/O P waves. Patient intubated,     RECOMMENDATIONS  1-Asystolic cardiac arrest, Acute hypoxic respiratory failure, Shock  - unclear etiology  -rec avoiding carbapenems and fine with just Vanco  -ICU support and monitoring    2-Osteomyeliits right calcaneous  6/4 S/p Selective debridement of wound 04-Jun-2024 10:52:57  Parviz Todd  6/4  TCx MRSA, Escherichia coli ESBL  Right calcaneus bone pathology:  -   Fragments of bone with chronic osteomyelitis.  -   Focal acute osteomyelitis cannot be ruled out.  C/w vancomycin 750 mg IV q12, dosing per pharmacy protocol and plan had been with inability to rule out osteo rec 6 weeks so last day 7/18  PICC placed 6/7    3-Rash still has not seen dermatology so we have no diagnosis of chronic issue but has improved from recent acute blistering    From an ID standpoint no further requirement for inpatient status for the management of ID issues. Fine with discharge from ID standpoint when other medical issues no longer require inpatient care and social issues allow for a safe discharge plan. To schedule an outpatient ID follow up appointment please call our office at [phone]    Thank you for consulting us and involving us in the management of this most interesting and challenging case.  We will follow along in the care of this patient. Please call us at [phone] or text me directly on my cell# at [phone] with any concerns.

## 2024-06-30 NOTE — PROGRESS NOTE ADULT - SUBJECTIVE AND OBJECTIVE BOX
Patient is a 54y old  Male who presents with a chief complaint of Acute respiratory failure with hypoxia (29 Jun 2024 12:42)      INTERVAL /OVERNIGHT EVENTS: feeling ok,     T(C): 36.9 (06-30-24 @ 05:29), Max: 36.9 (06-30-24 @ 05:29)  HR: 70 (06-30-24 @ 11:33) (70 - 83)  BP: 118/84 (06-30-24 @ 11:33) (118/84 - 119/76)  RR: 19 (06-30-24 @ 11:33) (18 - 19)  SpO2: 97% (06-30-24 @ 11:33) (95% - 97%)    MEDICATIONS  (STANDING):  apixaban 5 milliGRAM(s) Oral two times a day  ascorbic acid 500 milliGRAM(s) Oral daily  aspirin enteric coated 81 milliGRAM(s) Oral daily  atorvastatin 40 milliGRAM(s) Oral at bedtime  benzocaine 20% Spray 1 Spray(s) Topical every 6 hours  calamine/zinc oxide Lotion 1 Application(s) Topical two times a day  chlorhexidine 2% Cloths 1 Application(s) Topical daily  cholecalciferol 1000 Unit(s) Oral daily  clobetasol 0.05% Cream 1 Application(s) Topical two times a day  dextrose 10% Bolus 125 milliLiter(s) IV Bolus once  dextrose 5%. 1000 milliLiter(s) (50 mL/Hr) IV Continuous <Continuous>  dextrose 5%. 1000 milliLiter(s) (100 mL/Hr) IV Continuous <Continuous>  dextrose 50% Injectable 25 Gram(s) IV Push once  dextrose 50% Injectable 12.5 Gram(s) IV Push once  diltiazem    Tablet 120 milliGRAM(s) Oral every 8 hours  furosemide    Tablet 20 milliGRAM(s) Oral daily  gabapentin 100 milliGRAM(s) Oral every 12 hours  glucagon  Injectable 1 milliGRAM(s) IntraMuscular once  hydrOXYzine hydrochloride 25 milliGRAM(s) Oral two times a day  insulin lispro (ADMELOG) corrective regimen sliding scale   SubCutaneous three times a day before meals  insulin lispro (ADMELOG) corrective regimen sliding scale   SubCutaneous at bedtime  melatonin 5 milliGRAM(s) Oral at bedtime  metFORMIN 1000 milliGRAM(s) Oral two times a day  metoprolol succinate  milliGRAM(s) Oral two times a day  multivitamin 1 Tablet(s) Oral daily  naloxegol 25 milliGRAM(s) Oral daily  oxybutynin XL 10 milliGRAM(s) Oral daily  pantoprazole    Tablet 40 milliGRAM(s) Oral two times a day  polyethylene glycol 3350 17 Gram(s) Oral daily  potassium chloride    Tablet ER 10 milliEquivalent(s) Oral daily  predniSONE   Tablet 5 milliGRAM(s) Oral daily  senna 2 Tablet(s) Oral at bedtime  sucralfate 1 Gram(s) Oral two times a day  tiotropium 2.5 MICROgram(s)/olodaterol 2.5 MICROgram(s) (STIOLTO) Inhaler 2 Puff(s) Inhalation daily    MEDICATIONS  (PRN):  acetaminophen     Tablet .. 650 milliGRAM(s) Oral every 6 hours PRN Mild Pain (1 - 3)  diphenhydrAMINE 25 milliGRAM(s) Oral every 4 hours PRN Rash and/or Itching  metoprolol tartrate Injectable 5 milliGRAM(s) IV Push every 6 hours PRN HR >140  ondansetron Injectable 4 milliGRAM(s) IV Push every 6 hours PRN Nausea and/or Vomiting  oxyCODONE    IR 5 milliGRAM(s) Oral every 6 hours PRN Moderate Pain (4 - 6)  oxyCODONE    IR 10 milliGRAM(s) Oral every 6 hours PRN Severe Pain (7 - 10)  sodium chloride 0.65% Nasal 1 Spray(s) Both Nostrils three times a day PRN Nasal Congestion  sodium chloride 0.9% lock flush 10 milliLiter(s) IV Push every 1 hour PRN Pre/post blood products, medications, blood draw, and to maintain line patency        REVIEW OF SYSTEMS:  CONSTITUTIONAL: + fatigue  EYES: No eye pain, visual disturbances, or discharge  ENMT:  No difficulty hearing, tinnitus, vertigo; No sinus or throat pain  NECK: No pain or stiffness  RESPIRATORY: No cough, wheezing, chills or hemoptysis; No shortness of breath  CARDIOVASCULAR: No chest pain, palpitations, dizziness, or leg swelling  GASTROINTESTINAL: No abdominal or epigastric pain. No nausea, vomiting, or hematemesis; No diarrhea or constipation. No melena or hematochezia.  GENITOURINARY: No dysuria, frequency, hematuria, or incontinence  NEUROLOGICAL: No headaches, memory loss, loss of strength, numbness, or tremors  SKIN: + rashes   LYMPH NODES: No enlarged glands  ENDOCRINE: No heat or cold intolerance; No hair loss; No polydipsia or polyuria  MUSCULOSKELETAL: No joint pain or swelling; No muscle, back, or extremity pain  PSYCHIATRIC: No depression, anxiety, mood swings, or difficulty sleeping  HEME/LYMPH: No easy bruising, or bleeding gums  ALLERGY AND IMMUNOLOGIC: No hives or eczema    PHYSICAL EXAM:  GENERAL: NAD, well-groomed, well-developed  HEAD:  Atraumatic, Normocephalic  EYES: EOMI, PERRLA, conjunctiva and sclera clear  NECK: Supple, No JVD, Normal thyroid  NERVOUS SYSTEM:  Alert & Oriented X3, Good concentration; Motor Strength 5/5 B/L upper and lower extremities; DTRs 2+ intact and symmetric  CHEST/LUNG: Clear to auscultation bilaterally; No rales, rhonchi, wheezing, or rubs  HEART: Regular rate and rhythm; No murmurs, rubs, or gallops  ABDOMEN: Soft, Nontender, Nondistended; Bowel sounds present  EXTREMITIES:  2+ Peripheral Pulses, No clubbing, cyanosis, or edema  LYMPH: No lymphadenopathy noted  SKIN: No rashes or lesions                          11.7   11.90 )-----------( 335      ( 30 Jun 2024 07:10 )             37.4           LIVER FUNCTIONS - ( 30 Jun 2024 07:10 )  Alb: 2.4 g/dL / Pro: 5.8 g/dL / ALK PHOS: 96 U/L / ALT: 23 U/L / AST: 14 U/L / GGT: x             141|106|11<155  3.3|27|1.20  8.8,--,--  06-30 @ 07:10

## 2024-07-01 LAB
GLUCOSE BLDC GLUCOMTR-MCNC: 145 MG/DL — HIGH (ref 70–99)
GLUCOSE BLDC GLUCOMTR-MCNC: 156 MG/DL — HIGH (ref 70–99)
GLUCOSE BLDC GLUCOMTR-MCNC: 181 MG/DL — HIGH (ref 70–99)
GLUCOSE BLDC GLUCOMTR-MCNC: 194 MG/DL — HIGH (ref 70–99)

## 2024-07-01 PROCEDURE — 99232 SBSQ HOSP IP/OBS MODERATE 35: CPT

## 2024-07-01 RX ORDER — DILTIAZEM HYDROCHLORIDE 240 MG/1
120 CAPSULE, EXTENDED RELEASE ORAL EVERY 8 HOURS
Refills: 0 | Status: COMPLETED | OUTPATIENT
Start: 2024-07-01 | End: 2024-07-01

## 2024-07-01 RX ORDER — DILTIAZEM HYDROCHLORIDE 240 MG/1
360 CAPSULE, EXTENDED RELEASE ORAL DAILY
Refills: 0 | Status: DISCONTINUED | OUTPATIENT
Start: 2024-07-02 | End: 2024-07-05

## 2024-07-01 RX ORDER — FUROSEMIDE 10 MG/ML
20 INJECTION, SOLUTION INTRAMUSCULAR; INTRAVENOUS DAILY
Refills: 0 | Status: DISCONTINUED | OUTPATIENT
Start: 2024-07-01 | End: 2024-07-04

## 2024-07-01 RX ADMIN — CLOBETASOL PROPIONATE 1 APPLICATION(S): 0.5 CREAM TOPICAL at 06:17

## 2024-07-01 RX ADMIN — Medication 1000 UNIT(S): at 13:06

## 2024-07-01 RX ADMIN — Medication 1 APPLICATION(S): at 06:00

## 2024-07-01 RX ADMIN — Medication 1 TABLET(S): at 13:06

## 2024-07-01 RX ADMIN — Medication 10 MILLIGRAM(S): at 13:06

## 2024-07-01 RX ADMIN — Medication 100 MILLIGRAM(S): at 06:02

## 2024-07-01 RX ADMIN — Medication 100 MILLIGRAM(S): at 06:01

## 2024-07-01 RX ADMIN — PANTOPRAZOLE SODIUM 40 MILLIGRAM(S): 40 INJECTION, POWDER, FOR SOLUTION INTRAVENOUS at 06:01

## 2024-07-01 RX ADMIN — POTASSIUM CHLORIDE 10 MILLIEQUIVALENT(S): 600 TABLET, FILM COATED, EXTENDED RELEASE ORAL at 13:06

## 2024-07-01 RX ADMIN — Medication 100 MILLIGRAM(S): at 17:56

## 2024-07-01 RX ADMIN — Medication 5 MILLIGRAM(S): at 21:16

## 2024-07-01 RX ADMIN — FUROSEMIDE 20 MILLIGRAM(S): 10 INJECTION, SOLUTION INTRAMUSCULAR; INTRAVENOUS at 06:01

## 2024-07-01 RX ADMIN — OXYCODONE HYDROCHLORIDE 10 MILLIGRAM(S): 100 SOLUTION ORAL at 14:00

## 2024-07-01 RX ADMIN — OXYCODONE HYDROCHLORIDE 10 MILLIGRAM(S): 100 SOLUTION ORAL at 21:15

## 2024-07-01 RX ADMIN — INSULIN LISPRO 2: 100 INJECTION, SOLUTION SUBCUTANEOUS at 08:33

## 2024-07-01 RX ADMIN — HYDROXYZINE PAMOATE 25 MILLIGRAM(S): 50 CAPSULE ORAL at 06:02

## 2024-07-01 RX ADMIN — ATORVASTATIN CALCIUM 40 MILLIGRAM(S): 20 TABLET, FILM COATED ORAL at 21:16

## 2024-07-01 RX ADMIN — NALOXEGOL OXALATE 25 MILLIGRAM(S): 25 TABLET, FILM COATED ORAL at 13:06

## 2024-07-01 RX ADMIN — METFORMIN HYDROCHLORIDE 1000 MILLIGRAM(S): 850 TABLET, FILM COATED ORAL at 17:56

## 2024-07-01 RX ADMIN — APIXABAN 5 MILLIGRAM(S): 5 TABLET, FILM COATED ORAL at 17:56

## 2024-07-01 RX ADMIN — DILTIAZEM HYDROCHLORIDE 120 MILLIGRAM(S): 240 CAPSULE, EXTENDED RELEASE ORAL at 06:01

## 2024-07-01 RX ADMIN — POLYETHYLENE GLYCOL 3350 17 GRAM(S): 1 POWDER ORAL at 13:05

## 2024-07-01 RX ADMIN — PREDNISONE 5 MILLIGRAM(S): 10 TABLET ORAL at 06:02

## 2024-07-01 RX ADMIN — APIXABAN 5 MILLIGRAM(S): 5 TABLET, FILM COATED ORAL at 06:01

## 2024-07-01 RX ADMIN — Medication 100 MILLIGRAM(S): at 17:58

## 2024-07-01 RX ADMIN — HYDROXYZINE PAMOATE 25 MILLIGRAM(S): 50 CAPSULE ORAL at 17:56

## 2024-07-01 RX ADMIN — PANTOPRAZOLE SODIUM 40 MILLIGRAM(S): 40 INJECTION, POWDER, FOR SOLUTION INTRAVENOUS at 17:55

## 2024-07-01 RX ADMIN — VANCOMYCIN HYDROCHLORIDE 166.67 MILLIGRAM(S): KIT at 06:00

## 2024-07-01 RX ADMIN — INSULIN LISPRO 2: 100 INJECTION, SOLUTION SUBCUTANEOUS at 13:03

## 2024-07-01 RX ADMIN — OXYCODONE HYDROCHLORIDE 10 MILLIGRAM(S): 100 SOLUTION ORAL at 21:50

## 2024-07-01 RX ADMIN — DILTIAZEM HYDROCHLORIDE 120 MILLIGRAM(S): 240 CAPSULE, EXTENDED RELEASE ORAL at 21:16

## 2024-07-01 RX ADMIN — METFORMIN HYDROCHLORIDE 1000 MILLIGRAM(S): 850 TABLET, FILM COATED ORAL at 06:09

## 2024-07-01 RX ADMIN — Medication 25 MILLIGRAM(S): at 13:19

## 2024-07-01 RX ADMIN — CLOBETASOL PROPIONATE 1 APPLICATION(S): 0.5 CREAM TOPICAL at 18:07

## 2024-07-01 RX ADMIN — Medication 1 GRAM(S): at 17:55

## 2024-07-01 RX ADMIN — Medication 1 APPLICATION(S): at 17:57

## 2024-07-01 RX ADMIN — Medication 25 MILLIGRAM(S): at 21:15

## 2024-07-01 RX ADMIN — Medication 2 TABLET(S): at 21:17

## 2024-07-01 RX ADMIN — DILTIAZEM HYDROCHLORIDE 120 MILLIGRAM(S): 240 CAPSULE, EXTENDED RELEASE ORAL at 13:19

## 2024-07-01 RX ADMIN — Medication 1 GRAM(S): at 06:02

## 2024-07-01 RX ADMIN — ASPIRIN 81 MILLIGRAM(S): 325 TABLET, FILM COATED ORAL at 13:05

## 2024-07-01 RX ADMIN — OXYCODONE HYDROCHLORIDE 10 MILLIGRAM(S): 100 SOLUTION ORAL at 13:19

## 2024-07-01 RX ADMIN — Medication 500 MILLIGRAM(S): at 13:06

## 2024-07-01 NOTE — PHARMACOTHERAPY INTERVENTION NOTE - COMMENTS
1. Indication for the vancomycin: R heel wound/osteomyelitis  2. Requesting Provider: Dr. Whitten  3. Current plan and dosing regimen: 1250 mg Q24H  4. Day of therapy: 27  5. Cultures: Tissue Cx 5/26/24: E faecalis + MRSA. Tissue Cx 6/4/24: ESBL E coli + E faecalis. R heel surgical swab 6/14/24: MRSA + citrobacter freundii + pseudomonas stutzeri.  6. Target trough or AUC/BAYLEE: 400-600 hr*ug/mL  7. Day and time level is due: 7/3 05:00  8. Previous levels: 6/4 (19:31): 22, 6/5 (05:47): 14, 6/6 (20:20): 14, 6/9 (04:58): 14.4, 6/11 (04:30): 15.7, 6/13 (05:54): 17.3, 6/14 (01:25): 15.2, 6/14 (04:30): 16.1, 6/15 (5:40): 17.5, 6/16 (5:38): 15.3, 6/17 (05:52): 13.2, 6/18 (05:50): 11.8, 6/19 (06:15): 21.3, 6/20 (08:52): 21.6, 6/21 (09:30): 37.5, 06/22 (0945): 11.1, 06/23 (09:30): 15.9, 06/24 (08:00): 19.6, 06/27 (21:47): 18.2, 6/29 (10:05): 36.4 (taken during infusion), 6/30 (07:10): 20.3  9. Expected 24-hour AUC: 482 hr*ug/mL

## 2024-07-01 NOTE — PROGRESS NOTE ADULT - SUBJECTIVE AND OBJECTIVE BOX
[facility] DIV[facility] of INFECTIOUS DISEASE  Juventino Whitten MD PhD, Symone Hickey MD, Anne-Marie Li MD, Jeffery Jacobs MD, Ryan Romeo MD  and providing coverage with Melody Armstrong MD  Providing Infectious Disease Consultations at [facility], [facility], [facility]'s    Office# [phone] to schedule follow up appointments  Answering Service for urgent calls or New Consults [phone]  Cell# to text for urgent issues Juventino Whitten [phone]     infectious diseases progress note:    SARAH MORRISON is a 54y y. o. Male patient    Overnight and events of the last 24hrs reviewed    Allergies    ertapenem (Blisters; Rash)  fish (Hives)    Intolerances        ANTIBIOTICS/RELEVANT:  antimicrobials  vancomycin  IVPB 1250 milliGRAM(s) IV Intermittent every 24 hours    immunologic:    OTHER:  acetaminophen     Tablet .. 650 milliGRAM(s) Oral every 6 hours PRN  apixaban 5 milliGRAM(s) Oral two times a day  ascorbic acid 500 milliGRAM(s) Oral daily  aspirin enteric coated 81 milliGRAM(s) Oral daily  atorvastatin 40 milliGRAM(s) Oral at bedtime  benzocaine 20% Spray 1 Spray(s) Topical every 6 hours  calamine/zinc oxide Lotion 1 Application(s) Topical two times a day  chlorhexidine 2% Cloths 1 Application(s) Topical daily  cholecalciferol 1000 Unit(s) Oral daily  clobetasol 0.05% Cream 1 Application(s) Topical two times a day  dextrose 10% Bolus 125 milliLiter(s) IV Bolus once  dextrose 5%. 1000 milliLiter(s) IV Continuous <Continuous>  dextrose 5%. 1000 milliLiter(s) IV Continuous <Continuous>  dextrose 50% Injectable 25 Gram(s) IV Push once  dextrose 50% Injectable 12.5 Gram(s) IV Push once  diltiazem    Tablet 120 milliGRAM(s) Oral every 8 hours  diphenhydrAMINE 25 milliGRAM(s) Oral every 4 hours PRN  furosemide    Tablet 20 milliGRAM(s) Oral daily  gabapentin 100 milliGRAM(s) Oral every 12 hours  glucagon  Injectable 1 milliGRAM(s) IntraMuscular once  hydrOXYzine hydrochloride 25 milliGRAM(s) Oral two times a day  insulin lispro (ADMELOG) corrective regimen sliding scale   SubCutaneous at bedtime  insulin lispro (ADMELOG) corrective regimen sliding scale   SubCutaneous three times a day before meals  melatonin 5 milliGRAM(s) Oral at bedtime  metFORMIN 1000 milliGRAM(s) Oral two times a day  metoprolol succinate  milliGRAM(s) Oral two times a day  metoprolol tartrate Injectable 5 milliGRAM(s) IV Push every 6 hours PRN  multivitamin 1 Tablet(s) Oral daily  naloxegol 25 milliGRAM(s) Oral daily  ondansetron Injectable 4 milliGRAM(s) IV Push every 6 hours PRN  oxybutynin XL 10 milliGRAM(s) Oral daily  oxyCODONE    IR 5 milliGRAM(s) Oral every 6 hours PRN  oxyCODONE    IR 10 milliGRAM(s) Oral every 6 hours PRN  pantoprazole    Tablet 40 milliGRAM(s) Oral two times a day  polyethylene glycol 3350 17 Gram(s) Oral daily  potassium chloride    Tablet ER 10 milliEquivalent(s) Oral daily  predniSONE   Tablet 5 milliGRAM(s) Oral daily  senna 2 Tablet(s) Oral at bedtime  sodium chloride 0.65% Nasal 1 Spray(s) Both Nostrils three times a day PRN  sodium chloride 0.9% lock flush 10 milliLiter(s) IV Push every 1 hour PRN  sucralfate 1 Gram(s) Oral two times a day  tiotropium 2.5 MICROgram(s)/olodaterol 2.5 MICROgram(s) (STIOLTO) Inhaler 2 Puff(s) Inhalation daily      Objective:  Vital Signs Last 24 Hrs  T(C): 36.6 (01 Jul 2024 05:00), Max: 37.1 (30 Jun 2024 21:00)  T(F): 97.8 (01 Jul 2024 05:00), Max: 98.8 (30 Jun 2024 21:00)  HR: 82 (01 Jul 2024 05:00) (68 - 95)  BP: 121/82 (01 Jul 2024 05:00) (94/60 - 121/82)  BP(mean): --  RR: 18 (01 Jul 2024 05:00) (18 - 19)  SpO2: 99% (01 Jul 2024 05:00) (97% - 99%)    Parameters below as of 01 Jul 2024 05:00  Patient On (Oxygen Delivery Method): room air        T(C): 36.6 (07-01-24 @ 05:00), Max: 37.1 (06-30-24 @ 21:00)  T(C): 36.6 (07-01-24 @ 05:00), Max: 37.1 (06-30-24 @ 21:00)  T(C): 36.6 (07-01-24 @ 05:00), Max: 37.1 (06-30-24 @ 21:00)    PHYSICAL EXAM:  HEENT: NC atraumatic  Neck: supple  Respiratory: no accessory muscle use, breathing comfortably  Cardiovascular: distant  Gastrointestinal: normal appearing, nondistended  Extremities: no clubbing, no cyanosis,        LABS:                          11.7   11.90 )-----------( 335      ( 30 Jun 2024 07:10 )             37.4       WBC  11.90 06-30 @ 07:10  10.71 06-28 @ 09:45  10.40 06-27 @ 06:22  11.48 06-26 @ 18:10  9.99 06-24 @ 12:40      06-30    141  |  106  |  11  ----------------------------<  155<H>  3.3<L>   |  27  |  1.20    Ca    8.8      30 Jun 2024 07:10    TPro  5.8<L>  /  Alb  2.4<L>  /  TBili  0.5  /  DBili  x   /  AST  14<L>  /  ALT  23  /  AlkPhos  96  06-30      Creatinine: 1.20 mg/dL (06-30-24 @ 07:10)  Creatinine: 1.30 mg/dL (06-28-24 @ 09:45)  Creatinine: 1.10 mg/dL (06-27-24 @ 06:22)  Creatinine: 1.40 mg/dL (06-26-24 @ 18:10)  Creatinine: 1.20 mg/dL (06-24-24 @ 12:40)        Urinalysis Basic - ( 30 Jun 2024 07:10 )    Color: x / Appearance: x / SG: x / pH: x  Gluc: 155 mg/dL / Ketone: x  / Bili: x / Urobili: x   Blood: x / Protein: x / Nitrite: x   Leuk Esterase: x / RBC: x / WBC x   Sq Epi: x / Non Sq Epi: x / Bacteria: x            INFLAMMATORY MARKERS      MICROBIOLOGY:              RADIOLOGY & ADDITIONAL STUDIES:

## 2024-07-01 NOTE — CHART NOTE - NSCHARTNOTEFT_GEN_A_CORE
Assessment: patient seen for follow up. chart reviewed hospital course noted   54y old  Male who presents with a chief complaint of Acute respiratory failure with hypoxia, cardiac arrest, shock  patient seen in room eating fairly well. + BM per patient   patient reports dislikes minced and moist seen by speech easy to chew thin recommended. RD called MD to notify to change diet per speech recommendation  patient also wants ensure max protein supplement as offered by RD in past. 150kcals and 30 gms protein per serving      Factors impacting intake: [ ] none [ ] nausea  [ ] vomiting [ ] diarrhea [ ] constipation  [ ]chewing problems [x ] swallowing issues  [ ] other: easy to chew thin liquids per speech    Diet Prescription minced and moist dash tlc  Intake: up to 100% per flow sheet    Current Weight: 6/30 224.8# 7/1 222.6# 6/14 223.3# + 1 left and right foot edema       Pertinent Medications: MEDICATIONS  (STANDING):  apixaban 5 milliGRAM(s) Oral two times a day  ascorbic acid 500 milliGRAM(s) Oral daily  aspirin enteric coated 81 milliGRAM(s) Oral daily  atorvastatin 40 milliGRAM(s) Oral at bedtime  benzocaine 20% Spray 1 Spray(s) Topical every 6 hours  calamine/zinc oxide Lotion 1 Application(s) Topical two times a day  chlorhexidine 2% Cloths 1 Application(s) Topical daily  cholecalciferol 1000 Unit(s) Oral daily  clobetasol 0.05% Cream 1 Application(s) Topical two times a day  dextrose 10% Bolus 125 milliLiter(s) IV Bolus once  dextrose 5%. 1000 milliLiter(s) (50 mL/Hr) IV Continuous <Continuous>  dextrose 5%. 1000 milliLiter(s) (100 mL/Hr) IV Continuous <Continuous>  dextrose 50% Injectable 25 Gram(s) IV Push once  dextrose 50% Injectable 12.5 Gram(s) IV Push once  diltiazem    Tablet 120 milliGRAM(s) Oral every 8 hours  furosemide    Tablet 20 milliGRAM(s) Oral daily  gabapentin 100 milliGRAM(s) Oral every 12 hours  glucagon  Injectable 1 milliGRAM(s) IntraMuscular once  hydrOXYzine hydrochloride 25 milliGRAM(s) Oral two times a day  insulin lispro (ADMELOG) corrective regimen sliding scale   SubCutaneous at bedtime  insulin lispro (ADMELOG) corrective regimen sliding scale   SubCutaneous three times a day before meals  melatonin 5 milliGRAM(s) Oral at bedtime  metFORMIN 1000 milliGRAM(s) Oral two times a day  metoprolol succinate  milliGRAM(s) Oral two times a day  multivitamin 1 Tablet(s) Oral daily  naloxegol 25 milliGRAM(s) Oral daily  oxybutynin XL 10 milliGRAM(s) Oral daily  pantoprazole    Tablet 40 milliGRAM(s) Oral two times a day  polyethylene glycol 3350 17 Gram(s) Oral daily  potassium chloride    Tablet ER 10 milliEquivalent(s) Oral daily  predniSONE   Tablet 5 milliGRAM(s) Oral daily  senna 2 Tablet(s) Oral at bedtime  sucralfate 1 Gram(s) Oral two times a day  tiotropium 2.5 MICROgram(s)/olodaterol 2.5 MICROgram(s) (STIOLTO) Inhaler 2 Puff(s) Inhalation daily  vancomycin  IVPB 1250 milliGRAM(s) IV Intermittent every 24 hours    MEDICATIONS  (PRN):  acetaminophen     Tablet .. 650 milliGRAM(s) Oral every 6 hours PRN Mild Pain (1 - 3)  diphenhydrAMINE 25 milliGRAM(s) Oral every 4 hours PRN Rash and/or Itching  metoprolol tartrate Injectable 5 milliGRAM(s) IV Push every 6 hours PRN HR >140  ondansetron Injectable 4 milliGRAM(s) IV Push every 6 hours PRN Nausea and/or Vomiting  oxyCODONE    IR 5 milliGRAM(s) Oral every 6 hours PRN Moderate Pain (4 - 6)  oxyCODONE    IR 10 milliGRAM(s) Oral every 6 hours PRN Severe Pain (7 - 10)  sodium chloride 0.65% Nasal 1 Spray(s) Both Nostrils three times a day PRN Nasal Congestion  sodium chloride 0.9% lock flush 10 milliLiter(s) IV Push every 1 hour PRN Pre/post blood products, medications, blood draw, and to maintain line patency    Pertinent Labs: POCT 156, 144   Skin: sacrum right buttock stage 2    Estimated Needs:   [x ] no change since previous assessment[X] no change since previous assessment: based on #/88.4kg  25-30kcal/kg (2210-2652kcal)  1.2-1.4g pro/kg (106-124gm protein)      Previous Nutrition Diagnosis:   [ ] Inadequate Energy Intake [ ]Inadequate Oral Intake [ ] Excessive Energy Intake   [ ] Underweight [ x] Increased Nutrient Needs [ ] Overweight/Obesity   [ ] Altered GI Function [ ] Unintended Weight Loss [ ] Food & Nutrition Related Knowledge Deficit [ ] Malnutrition     Nutrition Diagnosis is [x ] ongoing  [ ] resolved [ ] not applicable     New Nutrition Diagnosis: [x ] not applicable       Interventions:   Recommend  [x ] Change Diet To: easy to chew low Na consistent cho diet  [ x] Nutrition Supplement ensure max protein BID  [ ] Nutrition Support  [x ] Other: increase Vit C to 500mg BID, consider speech re-evaluation as appropriate     Monitoring and Evaluation:   [x ] PO intake [ x ] Tolerance to diet prescription [ x ] weights [ x ] labs[ x ] follow up per protocol  [ ] other:

## 2024-07-01 NOTE — PROGRESS NOTE ADULT - ASSESSMENT
52 YO M with past medical history of AFib (on Eliquis), indwelling Aguilera, cerebral aneurysm, CAD (s/p stents), CVA, T2DM, HTN, OM (s/p debridement), PE, perforated gastric ulcer s/p omentopexy 2019 with Dr. Mejia, transferred from [facility] for difficulty in breathing s/p cardiac arrest    CAD, Afib, s/p Cardiac Arrest, Mod-Severe MR, HTN  - s/p cardiac arrest x2 w/ AHRF.  s/p intubation with successful extubation.   - Still tolerating RA with no evidence of volume overload    - Trops + but no sig trend. No clear evidence of acute ischemia  - Continue ASA and statin      - Known with chronic AFib with controlled rates overnight with 3 beats of aberrantly conducted WCT.  - Continue Cardizem and BB with more lenient parameter.  Now on Metoprolol Succinate 100mg BID. Would allow HR of 100s-110  - Switch CCB to long-acting in am, 7/2 Ordered)  - Continue Eliquis    - TTE normal EF and mod-severe MR  - No sign of volume overload on exam  - Continue  home Lasix 20mg PO daily    - ATN with creat normalized  - With diffuse rashes +pruritus.  ID following  - Monitor and replete lytes, keep K>4, Mg>2.  - Will continue to follow.    Henrietta Kim DNP, NP-C, AGACNP-C  Cardiology   Call TEAMS

## 2024-07-01 NOTE — PROGRESS NOTE ADULT - ASSESSMENT
Claude Villareal is a 54 YO M with past medical history of  AF on Eliquis, indwelling Aguilera, CAD s/p stents, CVA, DMT 2, Hypertension, osteomyelitis s/p debridement, PE, transferred from [facility] for difficulty in breathing.  His recent admission was for osteomyelitis and discharged on IV Vancomycin. On ED arrival he is unresponsive, apneic, no palpable pulse or blood pressure, EKG rhythm HR < 20/min W/O P waves. Patient intubated, Code ACLS activated, IV epinephrine, IV atropine administered, pulse became palpable, still Hypotensive, IV levophed administered. Admitted to ICU consulted,  Patient woke up and agitated try to pull out ETT,  IV propofol started,

## 2024-07-01 NOTE — PROGRESS NOTE ADULT - SUBJECTIVE AND OBJECTIVE BOX
Date of Service 07-01-24 @ 19:54    Patient is a 54y old  Male who presents with a chief complaint of Acute respiratory failure with hypoxia (01 Jul 2024 11:49)      INTERVAL /OVERNIGHT EVENTS: feeling ok, dose adjustment of rate control meds    MEDICATIONS  (STANDING):  apixaban 5 milliGRAM(s) Oral two times a day  ascorbic acid 500 milliGRAM(s) Oral daily  aspirin enteric coated 81 milliGRAM(s) Oral daily  atorvastatin 40 milliGRAM(s) Oral at bedtime  benzocaine 20% Spray 1 Spray(s) Topical every 6 hours  calamine/zinc oxide Lotion 1 Application(s) Topical two times a day  chlorhexidine 2% Cloths 1 Application(s) Topical daily  cholecalciferol 1000 Unit(s) Oral daily  clobetasol 0.05% Cream 1 Application(s) Topical two times a day  dextrose 10% Bolus 125 milliLiter(s) IV Bolus once  dextrose 5%. 1000 milliLiter(s) (50 mL/Hr) IV Continuous <Continuous>  dextrose 5%. 1000 milliLiter(s) (100 mL/Hr) IV Continuous <Continuous>  dextrose 50% Injectable 25 Gram(s) IV Push once  dextrose 50% Injectable 12.5 Gram(s) IV Push once  diltiazem    Tablet 120 milliGRAM(s) Oral every 8 hours  diltiazem    milliGRAM(s) Oral daily  furosemide    Tablet 20 milliGRAM(s) Oral daily  gabapentin 100 milliGRAM(s) Oral every 12 hours  glucagon  Injectable 1 milliGRAM(s) IntraMuscular once  hydrOXYzine hydrochloride 25 milliGRAM(s) Oral two times a day  insulin lispro (ADMELOG) corrective regimen sliding scale   SubCutaneous three times a day before meals  insulin lispro (ADMELOG) corrective regimen sliding scale   SubCutaneous at bedtime  melatonin 5 milliGRAM(s) Oral at bedtime  metFORMIN 1000 milliGRAM(s) Oral two times a day  metoprolol succinate  milliGRAM(s) Oral two times a day  multivitamin 1 Tablet(s) Oral daily  naloxegol 25 milliGRAM(s) Oral daily  oxybutynin XL 10 milliGRAM(s) Oral daily  pantoprazole    Tablet 40 milliGRAM(s) Oral two times a day  polyethylene glycol 3350 17 Gram(s) Oral daily  potassium chloride    Tablet ER 10 milliEquivalent(s) Oral daily  predniSONE   Tablet 5 milliGRAM(s) Oral daily  senna 2 Tablet(s) Oral at bedtime  sucralfate 1 Gram(s) Oral two times a day  tiotropium 2.5 MICROgram(s)/olodaterol 2.5 MICROgram(s) (STIOLTO) Inhaler 2 Puff(s) Inhalation daily  vancomycin  IVPB 1250 milliGRAM(s) IV Intermittent every 24 hours    MEDICATIONS  (PRN):  acetaminophen     Tablet .. 650 milliGRAM(s) Oral every 6 hours PRN Mild Pain (1 - 3)  diphenhydrAMINE 25 milliGRAM(s) Oral every 4 hours PRN Rash and/or Itching  metoprolol tartrate Injectable 5 milliGRAM(s) IV Push every 6 hours PRN HR >140  ondansetron Injectable 4 milliGRAM(s) IV Push every 6 hours PRN Nausea and/or Vomiting  oxyCODONE    IR 10 milliGRAM(s) Oral every 6 hours PRN Severe Pain (7 - 10)  oxyCODONE    IR 5 milliGRAM(s) Oral every 6 hours PRN Moderate Pain (4 - 6)  sodium chloride 0.65% Nasal 1 Spray(s) Both Nostrils three times a day PRN Nasal Congestion  sodium chloride 0.9% lock flush 10 milliLiter(s) IV Push every 1 hour PRN Pre/post blood products, medications, blood draw, and to maintain line patency      Allergies    ertapenem (Blisters; Rash)  fish (Hives)    Intolerances        REVIEW OF SYSTEMS:  CONSTITUTIONAL: No fever, weight loss, or fatigue  EYES: No eye pain, visual disturbances, or discharge  ENMT:  No difficulty hearing, tinnitus, vertigo; No sinus or throat pain  NECK: No pain or stiffness  RESPIRATORY: No cough, wheezing, chills or hemoptysis; No shortness of breath  CARDIOVASCULAR: No chest pain, palpitations, dizziness, or leg swelling  GASTROINTESTINAL: No abdominal or epigastric pain. No nausea, vomiting, or hematemesis; No diarrhea or constipation. No melena or hematochezia.  GENITOURINARY: No dysuria, frequency, hematuria, or incontinence  NEUROLOGICAL: No headaches, memory loss, loss of strength, numbness, or tremors  SKIN: + rashes   LYMPH NODES: No enlarged glands  ENDOCRINE: No heat or cold intolerance; No hair loss; No polydipsia or polyuria  MUSCULOSKELETAL: No joint pain or swelling; No muscle, back, or extremity pain  PSYCHIATRIC: No depression, anxiety, mood swings, or difficulty sleeping  HEME/LYMPH: No easy bruising, or bleeding gums  ALLERGY AND IMMUNOLOGIC: No hives or eczema    Vital Signs Last 24 Hrs  T(C): 36.6 (01 Jul 2024 05:00), Max: 37.1 (30 Jun 2024 21:00)  T(F): 97.8 (01 Jul 2024 05:00), Max: 98.8 (30 Jun 2024 21:00)  HR: 127 (01 Jul 2024 17:56) (68 - 127)  BP: 109/68 (01 Jul 2024 17:56) (104/78 - 121/82)  BP(mean): --  RR: 18 (01 Jul 2024 05:00) (18 - 18)  SpO2: 99% (01 Jul 2024 05:00) (99% - 99%)    Parameters below as of 01 Jul 2024 05:00  Patient On (Oxygen Delivery Method): room air        PHYSICAL EXAM:  GENERAL: NAD, well-groomed, well-developed  HEAD:  Atraumatic, Normocephalic  EYES: EOMI, PERRLA, conjunctiva and sclera clear  ENMT: No tonsillar erythema, exudates, or enlargement; Moist mucous membranes, Good dentition, No lesions  NECK: Supple, No JVD, Normal thyroid  NERVOUS SYSTEM:  Alert & Oriented X3, Good concentration; Motor Strength 5/5 B/L upper and lower extremities; DTRs 2+ intact and symmetric  CHEST/LUNG: Clear to auscultation bilaterally; No rales, rhonchi, wheezing, or rubs  HEART: Regular rate and rhythm; No murmurs, rubs, or gallops  ABDOMEN: Soft, Nontender, Nondistended; Bowel sounds present  EXTREMITIES:  2+ Peripheral Pulses, No clubbing, cyanosis, or edema  LYMPH: No lymphadenopathy noted  SKIN: + rashes    LABS:      Ca    8.8        30 Jun 2024 07:10        Urinalysis Basic - ( 30 Jun 2024 07:10 )    Color: x / Appearance: x / SG: x / pH: x  Gluc: 155 mg/dL / Ketone: x  / Bili: x / Urobili: x   Blood: x / Protein: x / Nitrite: x   Leuk Esterase: x / RBC: x / WBC x   Sq Epi: x / Non Sq Epi: x / Bacteria: x      CAPILLARY BLOOD GLUCOSE      POCT Blood Glucose.: 145 mg/dL (01 Jul 2024 16:49)  POCT Blood Glucose.: 181 mg/dL (01 Jul 2024 12:50)  POCT Blood Glucose.: 156 mg/dL (01 Jul 2024 08:14)  POCT Blood Glucose.: 144 mg/dL (30 Jun 2024 21:50)      RADIOLOGY & ADDITIONAL TESTS:    Notes Reviewed:  [x ] YES  [ ] NO    Care Discussed with Consultants/Other Providers [x ] YES  [ ] NO

## 2024-07-01 NOTE — PROGRESS NOTE ADULT - ASSESSMENT
54 YO M with  AF on Eliquis, indwelling Aguilera, CAD s/p stents, CVA, DMT 2, Hypertension, osteomyelitis s/p debridement, PE, transferred from [facility] for difficulty in breathing.  His recent admission was for osteomyelitis and discharged on IV Vancomycin. Reported at [facility] he was given a dose of ertapenem and developed difficulty breathing followed by apnea. On ED arrival he was unresponsive, apneic, no palpable pulse or blood pressure, EKG rhythm HR < 20/min W/O P waves. Patient intubated,     RECOMMENDATIONS  1-Asystolic cardiac arrest, Acute hypoxic respiratory failure, Shock  - unclear etiology  -rec avoiding carbapenems and fine with just Vanco  -ICU support and monitoring    2-Osteomyeliits right calcaneous  6/4 S/p Selective debridement of wound 04-Jun-2024 10:52:57  Parviz Todd  6/4  TCx MRSA, Escherichia coli ESBL  Right calcaneus bone pathology:  -   Fragments of bone with chronic osteomyelitis.  -   Focal acute osteomyelitis cannot be ruled out.  6/30 adjusted with pharmacy involvement dosing to vancomycin 1250 mg IV q24, dosing per pharmacy protocol and plan had been with inability to rule out osteo rec 6 weeks so last day 7/18  PICC placed 6/7    3-Rash still has not seen dermatology so we have no diagnosis of chronic issue but has improved from recent acute blistering    From an ID standpoint no further requirement for inpatient status for the management of ID issues. Fine with discharge from ID standpoint when other medical issues no longer require inpatient care and social issues allow for a safe discharge plan. To schedule an outpatient ID follow up appointment please call our office at [phone]    Thank you for consulting us and involving us in the management of this most interesting and challenging case.  We will follow along in the care of this patient. Please call us at [phone] or text me directly on my cell# at [phone] with any concerns.

## 2024-07-01 NOTE — PROGRESS NOTE ADULT - SUBJECTIVE AND OBJECTIVE BOX
[facility] -- Brittany Lutz, Reynaldo Sweeney Savella, , Gera Maya  Office # [phone]    Follow Up:  s/p Cardiac Arrest, Afib with SVR, Diastolic HF    Subjective/Observations: Denies any kind of respiratory or cardiac discomfort.  Remains on RA.  Still c/o itching    REVIEW OF SYSTEMS: All other review of systems is negative unless indicated above  PAST MEDICAL & SURGICAL HISTORY:  Diabetes  Diabetes mellitus with no complication  Afib  Hypertension  BPH (benign prostatic hyperplasia)  Perforated gastric ulcer  s/p emergent ex-lap omentopexy and plication 6/2019  Pulmonary embolism  History of non-ST elevation myocardial infarction (NSTEMI)  Osteomyelitis  s/p debridement  CAD S/P percutaneous coronary angioplasty  Cerebrovascular accident  H/O abdominal surgery  Perforated gastric ulcer  Traumatic amputation of left foot, initial encounter    MEDICATIONS  (STANDING):  apixaban 5 milliGRAM(s) Oral two times a day  ascorbic acid 500 milliGRAM(s) Oral daily  aspirin enteric coated 81 milliGRAM(s) Oral daily  atorvastatin 40 milliGRAM(s) Oral at bedtime  benzocaine 20% Spray 1 Spray(s) Topical every 6 hours  calamine/zinc oxide Lotion 1 Application(s) Topical two times a day  chlorhexidine 2% Cloths 1 Application(s) Topical daily  cholecalciferol 1000 Unit(s) Oral daily  clobetasol 0.05% Cream 1 Application(s) Topical two times a day  dextrose 10% Bolus 125 milliLiter(s) IV Bolus once  dextrose 5%. 1000 milliLiter(s) (50 mL/Hr) IV Continuous <Continuous>  dextrose 5%. 1000 milliLiter(s) (100 mL/Hr) IV Continuous <Continuous>  dextrose 50% Injectable 25 Gram(s) IV Push once  dextrose 50% Injectable 12.5 Gram(s) IV Push once  diltiazem    Tablet 120 milliGRAM(s) Oral every 8 hours  furosemide    Tablet 20 milliGRAM(s) Oral daily  gabapentin 100 milliGRAM(s) Oral every 12 hours  glucagon  Injectable 1 milliGRAM(s) IntraMuscular once  hydrOXYzine hydrochloride 25 milliGRAM(s) Oral two times a day  insulin lispro (ADMELOG) corrective regimen sliding scale   SubCutaneous three times a day before meals  insulin lispro (ADMELOG) corrective regimen sliding scale   SubCutaneous at bedtime  melatonin 5 milliGRAM(s) Oral at bedtime  metFORMIN 1000 milliGRAM(s) Oral two times a day  metoprolol succinate  milliGRAM(s) Oral two times a day  multivitamin 1 Tablet(s) Oral daily  naloxegol 25 milliGRAM(s) Oral daily  oxybutynin XL 10 milliGRAM(s) Oral daily  pantoprazole    Tablet 40 milliGRAM(s) Oral two times a day  polyethylene glycol 3350 17 Gram(s) Oral daily  potassium chloride    Tablet ER 10 milliEquivalent(s) Oral daily  predniSONE   Tablet 5 milliGRAM(s) Oral daily  senna 2 Tablet(s) Oral at bedtime  sucralfate 1 Gram(s) Oral two times a day  tiotropium 2.5 MICROgram(s)/olodaterol 2.5 MICROgram(s) (STIOLTO) Inhaler 2 Puff(s) Inhalation daily  vancomycin  IVPB 1250 milliGRAM(s) IV Intermittent every 24 hours    MEDICATIONS  (PRN):  acetaminophen     Tablet .. 650 milliGRAM(s) Oral every 6 hours PRN Mild Pain (1 - 3)  diphenhydrAMINE 25 milliGRAM(s) Oral every 4 hours PRN Rash and/or Itching  metoprolol tartrate Injectable 5 milliGRAM(s) IV Push every 6 hours PRN HR >140  ondansetron Injectable 4 milliGRAM(s) IV Push every 6 hours PRN Nausea and/or Vomiting  oxyCODONE    IR 5 milliGRAM(s) Oral every 6 hours PRN Moderate Pain (4 - 6)  oxyCODONE    IR 10 milliGRAM(s) Oral every 6 hours PRN Severe Pain (7 - 10)  sodium chloride 0.65% Nasal 1 Spray(s) Both Nostrils three times a day PRN Nasal Congestion  sodium chloride 0.9% lock flush 10 milliLiter(s) IV Push every 1 hour PRN Pre/post blood products, medications, blood draw, and to maintain line patency    Allergies    ertapenem (Blisters; Rash)  fish (Hives)    Intolerances    Vital Signs Last 24 Hrs  T(C): 36.6 (01 Jul 2024 05:00), Max: 37.1 (30 Jun 2024 21:00)  T(F): 97.8 (01 Jul 2024 05:00), Max: 98.8 (30 Jun 2024 21:00)  HR: 82 (01 Jul 2024 05:00) (68 - 95)  BP: 121/82 (01 Jul 2024 05:00) (94/60 - 121/82)  BP(mean): --  RR: 18 (01 Jul 2024 05:00) (18 - 18)  SpO2: 99% (01 Jul 2024 05:00) (99% - 99%)    Parameters below as of 01 Jul 2024 05:00  Patient On (Oxygen Delivery Method): room air      I&O's Summary    30 Jun 2024 07:01  -  01 Jul 2024 07:00  --------------------------------------------------------  IN: 480 mL / OUT: 1950 mL / NET: -1470 mL      LABS: All Labs Reviewed:                        11.7   11.90 )-----------( 335      ( 30 Jun 2024 07:10 )             37.4     30 Jun 2024 07:10    141    |  106    |  11     ----------------------------<  155    3.3     |  27     |  1.20     Ca    8.8        30 Jun 2024 07:10    TPro  5.8    /  Alb  2.4    /  TBili  0.5    /  DBili  x      /  AST  14     /  ALT  23     /  AlkPhos  96     30 Jun 2024 07:10    PHYSICAL EXAM:  TELE: Afib  Constitutional: NAD, awake and alert  HEENT: Moist Mucous Membranes, Anicteric  Pulmonary: Non-labored, breath sounds are clear but diminished bilaterally, No wheezing, rales or rhonchi  Cardiovascular: IRRR, S1 and S2, +murmurs, no rubs, gallops or clicks  Gastrointestinal: Bowel Sounds present, soft, nontender.   Lymph: No peripheral edema. No lymphadenopathy.  Skin: Generalized rashes.  +skin breakdown on sacrum.  Left heel ulcer with dressing  Psych:  Mood & affect appropriate    12 Lead ECG:   Ventricular Rate 126 BPM    QRS Duration 76 ms    Q-T Interval 302 ms    QTC Calculation(Bazett) 437 ms    R Axis -12 degrees    T Axis 103 degrees    Diagnosis Line Atrial fibrillation with rapid ventricular response  Low voltage QRS  Nonspecific ST and T wave abnormality  Abnormal ECG  When compared with ECG of 14-JUN-2024 11:03,  Questionable change in QRS axis  Nonspecific T wave abnormality now evident in Inferior leads  Nonspecific T wave abnormality, worse in Anterolateral leads  Confirmed by Juventino Soto MD ([id number]) on 6/20/2024 12:52:09 PM (06-20-24 @ 11:37)      TRANSTHORACIC ECHOCARDIOGRAM REPORT  ________________________________________________________________________________                                      _______       Pt. Name:       SARAH MORRISON Study Date:    6/14/2024  MRN:            [id number]         YOB: 1970  Accession #:    [id number]        Age:           53 years  Account#:       [id number]       Gender:        M  Visit ID#  Heart Rate:                      Height:        72.05 in (183.00 cm)  Rhythm:          Weight:        222.66 lb (101.00 kg)  Blood Pressure: 88/53 mmHg       BSA/BMI:       2.23 m² / 30.16 kg/m²  ________________________________________________________________________________________  Referring Physician:    [id number] Hill Brizuela  Interpreting Physician: Rahel Boss MD  Primary Sonographer:    Mounika FELDMAN    CPT:               ECHO TTE WO CON COMP W DOPP - 03766.m  Indication(s):     Heart failure, unspecified - I50.9  Procedure:         Transthoracic echocardiogram with 2-D, M-mode and complete                     spectral and color flow Doppler.  Ordering Location: ICU1  Admission Status:  Inpatient    _______________________________________________________________________________________     CONCLUSIONS:      1. Left ventricular cavity is normal in size. Left ventricular systolic function is normal with an ejection fraction visually estimated at 55 to 60 %. There are no regional wall motion abnormalities seen.   2. Moderate to severe left ventricular hypertrophy.   3. Normal right ventricular cavity size, with normal wall thickness, and normal systolic function.   4. The left atrium is severely dilated.   5. The right atrium is severely dilated.   6. Trileaflet aortic valve with normal systolic excursion. There is focal calcification of the aortic valve leaflets.   7. Mild to moderate mitral regurgitation.   8. There is moderate calcification of the mitral valve annulus.   9. Structurally normal tricuspid valve with normal leaflet excursion.Mild tricuspid regurgitation.  10. The inferior vena cava is normal in size measuring 1.70 cm in diameter, (normal <2.1cm) with normal inspiratory collapse (normal >50%) consistent with normal right atrial pressure (~3, range 0-5mmHg).  11. Estimatedpulmonary artery systolic pressure is 43 mmHg.    ________________________________________________________________________________________  FINDINGS:     Left Ventricle:  The left ventricular cavity is normal in size. Left ventricular systolic function is normal with an ejection fraction visually estimated at 55 to 60%. There are no regional wall motion abnormalities seen. There is normal left ventricular diastolic function, with normal filling pressure. Moderate to severe left ventricular hypertrophy.     Right Ventricle:  The right ventricular cavity is normal in size, with normal wall thickness and normal systolic function.     Left Atrium:  The left atrium is severely dilated.     Right Atrium:  The right atrium is severely dilated.     Aortic Valve:  The aortic valve appears trileaflet with normal systolic excursion. There is focal calcification of the aortic valve leaflets. There is no aortic valve stenosis. There is no evidence of aortic regurgitation.     Mitral Valve:  There is moderate calcification of the mitral valve annulus. There is mild to moderate mitral regurgitation.     Tricuspid Valve:  Structurally normal tricuspid valve with normal leaflet excursion. There is mild tricuspid regurgitation. Estimated pulmonary artery systolic pressure is 43 mmHg.     Pulmonic Valve:  The pulmonic valve was not well visualized.     Systemic Veins:  The inferior vena cava is normal in size measuring 1.70 cm in diameter, (normal <2.1cm) with normal inspiratory collapse (normal >50%) consistent with normal right atrial pressure (~3, range 0-5mmHg).  ____________________________________________________________________  QUANTITATIVE DATA:  Left Ventricle Measurements: (Indexed to BSA)     IVSd (2D):   1.7 cm  LVPWd (2D):  1.4 cm  LVIDd (2D):  4.3 cm  LVIDs (2D):  3.0 cm  LV Mass:     281 g  125.9 g/m²  Visualized LV EF%: 55 to 60%     MV E Vmax:    1.07 m/s  MV A Vmax:    0.00 m/s  e' lateral:   9.68 cm/s  e' medial:    10.90 cm/s  E/e' lateral: 11.05  E/e' medial:  9.82  E/e' Average: 10.40  MV DT:        180 msec    Aorta Measurements: (Normal range) (Indexed to BSA)     Sinuses of Valsalva: 3.40 cm (3.1 - 3.7 cm)       Left Atrium Measurements: (Indexed to BSA)  LA Diam 2D: 4.00 cm       LVOT / RVOT/ Qp/Qs Data: (Indexed to BSA)  LVOT Diameter: 2.10 cm  LVOT Area:     3.46 cm²    Mitral Valve Measurements:     MV E Vmax: 1.1 m/s  MV A Vmax: 0.0 m/s       Tricuspid Valve Measurements:     TR Vmax:          3.2 m/s  TR Peak Gradient: 40.4 mmHg  RA Pressure:      3 mmHg  PASP:             43 mmHg    ________________________________________________________________________________________  Electronically signed on 6/14/2024 at 12:08:59 PM by Rahel Boss MD         *** Final ***

## 2024-07-02 LAB
ALBUMIN SERPL ELPH-MCNC: 2.4 G/DL — LOW (ref 3.3–5)
ALP SERPL-CCNC: 109 U/L — SIGNIFICANT CHANGE UP (ref 40–120)
ALT FLD-CCNC: 19 U/L — SIGNIFICANT CHANGE UP (ref 12–78)
ANION GAP SERPL CALC-SCNC: 10 MMOL/L — SIGNIFICANT CHANGE UP (ref 5–17)
AST SERPL-CCNC: 13 U/L — LOW (ref 15–37)
BILIRUB SERPL-MCNC: 0.4 MG/DL — SIGNIFICANT CHANGE UP (ref 0.2–1.2)
BUN SERPL-MCNC: 15 MG/DL — SIGNIFICANT CHANGE UP (ref 7–23)
CALCIUM SERPL-MCNC: 8.7 MG/DL — SIGNIFICANT CHANGE UP (ref 8.5–10.1)
CHLORIDE SERPL-SCNC: 102 MMOL/L — SIGNIFICANT CHANGE UP (ref 96–108)
CO2 SERPL-SCNC: 24 MMOL/L — SIGNIFICANT CHANGE UP (ref 22–31)
CREAT SERPL-MCNC: 1.5 MG/DL — HIGH (ref 0.5–1.3)
EGFR: 54 ML/MIN/1.73M2 — LOW
GLUCOSE BLDC GLUCOMTR-MCNC: 150 MG/DL — HIGH (ref 70–99)
GLUCOSE BLDC GLUCOMTR-MCNC: 172 MG/DL — HIGH (ref 70–99)
GLUCOSE BLDC GLUCOMTR-MCNC: 192 MG/DL — HIGH (ref 70–99)
GLUCOSE BLDC GLUCOMTR-MCNC: 195 MG/DL — HIGH (ref 70–99)
GLUCOSE SERPL-MCNC: 208 MG/DL — HIGH (ref 70–99)
HCT VFR BLD CALC: 38.2 % — LOW (ref 39–50)
HGB BLD-MCNC: 12.4 G/DL — LOW (ref 13–17)
MAGNESIUM SERPL-MCNC: 1.3 MG/DL — LOW (ref 1.6–2.6)
MCHC RBC-ENTMCNC: 30.2 PG — SIGNIFICANT CHANGE UP (ref 27–34)
MCHC RBC-ENTMCNC: 32.5 GM/DL — SIGNIFICANT CHANGE UP (ref 32–36)
MCV RBC AUTO: 93.2 FL — SIGNIFICANT CHANGE UP (ref 80–100)
NRBC # BLD: 0 /100 WBCS — SIGNIFICANT CHANGE UP (ref 0–0)
PLATELET # BLD AUTO: 385 K/UL — SIGNIFICANT CHANGE UP (ref 150–400)
POTASSIUM SERPL-MCNC: 4.2 MMOL/L — SIGNIFICANT CHANGE UP (ref 3.5–5.3)
POTASSIUM SERPL-SCNC: 4.2 MMOL/L — SIGNIFICANT CHANGE UP (ref 3.5–5.3)
PROT SERPL-MCNC: 6.1 G/DL — SIGNIFICANT CHANGE UP (ref 6–8.3)
RBC # BLD: 4.1 M/UL — LOW (ref 4.2–5.8)
RBC # FLD: 14.3 % — SIGNIFICANT CHANGE UP (ref 10.3–14.5)
SODIUM SERPL-SCNC: 136 MMOL/L — SIGNIFICANT CHANGE UP (ref 135–145)
WBC # BLD: 15.31 K/UL — HIGH (ref 3.8–10.5)
WBC # FLD AUTO: 15.31 K/UL — HIGH (ref 3.8–10.5)

## 2024-07-02 PROCEDURE — 99232 SBSQ HOSP IP/OBS MODERATE 35: CPT

## 2024-07-02 RX ORDER — PREDNISONE 10 MG/1
10 TABLET ORAL DAILY
Refills: 0 | Status: DISCONTINUED | OUTPATIENT
Start: 2024-07-02 | End: 2024-07-05

## 2024-07-02 RX ORDER — POTASSIUM CHLORIDE 600 MG/1
10 TABLET, FILM COATED, EXTENDED RELEASE ORAL
Refills: 0 | Status: DISCONTINUED | OUTPATIENT
Start: 2024-07-02 | End: 2024-07-02

## 2024-07-02 RX ORDER — POTASSIUM CHLORIDE 600 MG/1
10 TABLET, FILM COATED, EXTENDED RELEASE ORAL
Refills: 0 | Status: DISCONTINUED | OUTPATIENT
Start: 2024-07-02 | End: 2024-07-05

## 2024-07-02 RX ADMIN — TIOTROPIUM BROMIDE AND OLODATEROL 2 PUFF(S): 3.124; 2.736 SPRAY, METERED RESPIRATORY (INHALATION) at 05:54

## 2024-07-02 RX ADMIN — Medication 25 MILLIGRAM(S): at 21:15

## 2024-07-02 RX ADMIN — POTASSIUM CHLORIDE 10 MILLIEQUIVALENT(S): 600 TABLET, FILM COATED, EXTENDED RELEASE ORAL at 18:17

## 2024-07-02 RX ADMIN — ATORVASTATIN CALCIUM 40 MILLIGRAM(S): 20 TABLET, FILM COATED ORAL at 21:15

## 2024-07-02 RX ADMIN — PANTOPRAZOLE SODIUM 40 MILLIGRAM(S): 40 INJECTION, POWDER, FOR SOLUTION INTRAVENOUS at 18:17

## 2024-07-02 RX ADMIN — Medication 100 MILLIGRAM(S): at 18:16

## 2024-07-02 RX ADMIN — POTASSIUM CHLORIDE 100 MILLIEQUIVALENT(S): 600 TABLET, FILM COATED, EXTENDED RELEASE ORAL at 12:40

## 2024-07-02 RX ADMIN — PANTOPRAZOLE SODIUM 40 MILLIGRAM(S): 40 INJECTION, POWDER, FOR SOLUTION INTRAVENOUS at 05:46

## 2024-07-02 RX ADMIN — Medication 100 MILLIGRAM(S): at 05:47

## 2024-07-02 RX ADMIN — Medication 1000 UNIT(S): at 12:39

## 2024-07-02 RX ADMIN — NALOXEGOL OXALATE 25 MILLIGRAM(S): 25 TABLET, FILM COATED ORAL at 12:40

## 2024-07-02 RX ADMIN — INSULIN LISPRO 2: 100 INJECTION, SOLUTION SUBCUTANEOUS at 08:50

## 2024-07-02 RX ADMIN — VANCOMYCIN HYDROCHLORIDE 166.67 MILLIGRAM(S): KIT at 05:46

## 2024-07-02 RX ADMIN — METFORMIN HYDROCHLORIDE 1000 MILLIGRAM(S): 850 TABLET, FILM COATED ORAL at 05:47

## 2024-07-02 RX ADMIN — OXYCODONE HYDROCHLORIDE 10 MILLIGRAM(S): 100 SOLUTION ORAL at 21:15

## 2024-07-02 RX ADMIN — Medication 1 GRAM(S): at 18:16

## 2024-07-02 RX ADMIN — APIXABAN 5 MILLIGRAM(S): 5 TABLET, FILM COATED ORAL at 05:47

## 2024-07-02 RX ADMIN — DILTIAZEM HYDROCHLORIDE 360 MILLIGRAM(S): 240 CAPSULE, EXTENDED RELEASE ORAL at 05:47

## 2024-07-02 RX ADMIN — Medication 10 MILLIGRAM(S): at 12:40

## 2024-07-02 RX ADMIN — APIXABAN 5 MILLIGRAM(S): 5 TABLET, FILM COATED ORAL at 18:17

## 2024-07-02 RX ADMIN — INSULIN LISPRO 2: 100 INJECTION, SOLUTION SUBCUTANEOUS at 12:38

## 2024-07-02 RX ADMIN — OXYCODONE HYDROCHLORIDE 10 MILLIGRAM(S): 100 SOLUTION ORAL at 22:00

## 2024-07-02 RX ADMIN — Medication 2 TABLET(S): at 21:14

## 2024-07-02 RX ADMIN — HYDROXYZINE PAMOATE 25 MILLIGRAM(S): 50 CAPSULE ORAL at 05:47

## 2024-07-02 RX ADMIN — ASPIRIN 81 MILLIGRAM(S): 325 TABLET, FILM COATED ORAL at 12:39

## 2024-07-02 RX ADMIN — Medication 1 GRAM(S): at 05:47

## 2024-07-02 RX ADMIN — Medication 500 MILLIGRAM(S): at 12:39

## 2024-07-02 RX ADMIN — POLYETHYLENE GLYCOL 3350 17 GRAM(S): 1 POWDER ORAL at 12:39

## 2024-07-02 RX ADMIN — PREDNISONE 5 MILLIGRAM(S): 10 TABLET ORAL at 05:48

## 2024-07-02 RX ADMIN — FUROSEMIDE 20 MILLIGRAM(S): 10 INJECTION, SOLUTION INTRAMUSCULAR; INTRAVENOUS at 05:56

## 2024-07-02 RX ADMIN — Medication 5 MILLIGRAM(S): at 21:15

## 2024-07-02 RX ADMIN — HYDROXYZINE PAMOATE 25 MILLIGRAM(S): 50 CAPSULE ORAL at 18:16

## 2024-07-02 RX ADMIN — METFORMIN HYDROCHLORIDE 1000 MILLIGRAM(S): 850 TABLET, FILM COATED ORAL at 18:17

## 2024-07-02 RX ADMIN — Medication 1 TABLET(S): at 12:39

## 2024-07-02 NOTE — PROGRESS NOTE ADULT - SUBJECTIVE AND OBJECTIVE BOX
Date of Service 07-02-24 @ 16:02    Patient is a 54y old  Male who presents with a chief complaint of Acute respiratory failure with hypoxia (02 Jul 2024 09:11)      INTERVAL /OVERNIGHT EVENTS: worsening itching    MEDICATIONS  (STANDING):  apixaban 5 milliGRAM(s) Oral two times a day  ascorbic acid 500 milliGRAM(s) Oral daily  aspirin enteric coated 81 milliGRAM(s) Oral daily  atorvastatin 40 milliGRAM(s) Oral at bedtime  benzocaine 20% Spray 1 Spray(s) Topical every 6 hours  calamine/zinc oxide Lotion 1 Application(s) Topical two times a day  chlorhexidine 2% Cloths 1 Application(s) Topical daily  cholecalciferol 1000 Unit(s) Oral daily  clobetasol 0.05% Cream 1 Application(s) Topical two times a day  dextrose 10% Bolus 125 milliLiter(s) IV Bolus once  dextrose 5%. 1000 milliLiter(s) (50 mL/Hr) IV Continuous <Continuous>  dextrose 5%. 1000 milliLiter(s) (100 mL/Hr) IV Continuous <Continuous>  dextrose 50% Injectable 25 Gram(s) IV Push once  dextrose 50% Injectable 12.5 Gram(s) IV Push once  diltiazem    milliGRAM(s) Oral daily  furosemide    Tablet 20 milliGRAM(s) Oral daily  gabapentin 100 milliGRAM(s) Oral every 12 hours  glucagon  Injectable 1 milliGRAM(s) IntraMuscular once  hydrOXYzine hydrochloride 25 milliGRAM(s) Oral two times a day  insulin lispro (ADMELOG) corrective regimen sliding scale   SubCutaneous at bedtime  insulin lispro (ADMELOG) corrective regimen sliding scale   SubCutaneous three times a day before meals  melatonin 5 milliGRAM(s) Oral at bedtime  metFORMIN 1000 milliGRAM(s) Oral two times a day  metoprolol succinate  milliGRAM(s) Oral two times a day  multivitamin 1 Tablet(s) Oral daily  naloxegol 25 milliGRAM(s) Oral daily  oxybutynin XL 10 milliGRAM(s) Oral daily  pantoprazole    Tablet 40 milliGRAM(s) Oral two times a day  polyethylene glycol 3350 17 Gram(s) Oral daily  potassium chloride    Tablet ER 10 milliEquivalent(s) Oral two times a day  predniSONE   Tablet 10 milliGRAM(s) Oral daily  senna 2 Tablet(s) Oral at bedtime  sucralfate 1 Gram(s) Oral two times a day  tiotropium 2.5 MICROgram(s)/olodaterol 2.5 MICROgram(s) (STIOLTO) Inhaler 2 Puff(s) Inhalation daily  vancomycin  IVPB 1250 milliGRAM(s) IV Intermittent every 24 hours    MEDICATIONS  (PRN):  acetaminophen     Tablet .. 650 milliGRAM(s) Oral every 6 hours PRN Mild Pain (1 - 3)  diphenhydrAMINE 25 milliGRAM(s) Oral every 4 hours PRN Rash and/or Itching  ondansetron Injectable 4 milliGRAM(s) IV Push every 6 hours PRN Nausea and/or Vomiting  oxyCODONE    IR 10 milliGRAM(s) Oral every 6 hours PRN Severe Pain (7 - 10)  oxyCODONE    IR 5 milliGRAM(s) Oral every 6 hours PRN Moderate Pain (4 - 6)  sodium chloride 0.65% Nasal 1 Spray(s) Both Nostrils three times a day PRN Nasal Congestion  sodium chloride 0.9% lock flush 10 milliLiter(s) IV Push every 1 hour PRN Pre/post blood products, medications, blood draw, and to maintain line patency      Allergies    ertapenem (Blisters; Rash)  fish (Hives)    Intolerances        REVIEW OF SYSTEMS:  CONSTITUTIONAL: No fever, weight loss, or fatigue  EYES: No eye pain, visual disturbances, or discharge  ENMT:  No difficulty hearing, tinnitus, vertigo; No sinus or throat pain  NECK: No pain or stiffness  RESPIRATORY: No cough, wheezing, chills or hemoptysis; No shortness of breath  CARDIOVASCULAR: No chest pain, palpitations, dizziness, or leg swelling  GASTROINTESTINAL: No abdominal or epigastric pain. No nausea, vomiting, or hematemesis; No diarrhea or constipation. No melena or hematochezia.  GENITOURINARY: No dysuria, frequency, hematuria, or incontinence  NEUROLOGICAL: No headaches, memory loss, loss of strength, numbness, or tremors  SKIN: + rashes  LYMPH NODES: No enlarged glands  ENDOCRINE: No heat or cold intolerance; No hair loss; No polydipsia or polyuria  MUSCULOSKELETAL: No joint pain or swelling; No muscle, back, or extremity pain  PSYCHIATRIC: No depression, anxiety, mood swings, or difficulty sleeping  HEME/LYMPH: No easy bruising, or bleeding gums  ALLERGY AND IMMUNOLOGIC: No hives or eczema    Vital Signs Last 24 Hrs  T(C): 35.9 (02 Jul 2024 12:30), Max: 36.8 (01 Jul 2024 19:55)  T(F): 96.7 (02 Jul 2024 12:30), Max: 98.2 (01 Jul 2024 19:55)  HR: 86 (02 Jul 2024 12:30) (83 - 127)  BP: 97/61 (02 Jul 2024 12:30) (97/61 - 109/68)  BP(mean): --  RR: 16 (02 Jul 2024 12:30) (16 - 19)  SpO2: 98% (02 Jul 2024 12:30) (96% - 98%)    Parameters below as of 02 Jul 2024 12:30  Patient On (Oxygen Delivery Method): room air        PHYSICAL EXAM:  GENERAL: NAD, well-groomed, well-developed  HEAD:  Atraumatic, Normocephalic  EYES: EOMI, PERRLA, conjunctiva and sclera clear  ENMT: No tonsillar erythema, exudates, or enlargement; Moist mucous membranes, Good dentition, No lesions  NECK: Supple, No JVD, Normal thyroid  NERVOUS SYSTEM:  Alert & Oriented X3, Good concentration; Motor Strength 5/5 B/L upper and lower extremities; DTRs 2+ intact and symmetric  CHEST/LUNG: Clear to auscultation bilaterally; No rales, rhonchi, wheezing, or rubs  HEART: Regular rate and rhythm; No murmurs, rubs, or gallops  ABDOMEN: Soft, Nontender, Nondistended; Bowel sounds present  EXTREMITIES:  2+ Peripheral Pulses, No clubbing, cyanosis, or edema  LYMPH: No lymphadenopathy noted  SKIN: + rashes or lesions    LABS:                        12.4   15.31 )-----------( 385      ( 02 Jul 2024 10:58 )             38.2     02 Jul 2024 10:58    136    |  102    |  15     ----------------------------<  208    4.2     |  24     |  1.50     Ca    8.7        02 Jul 2024 10:58  Mg     1.3       02 Jul 2024 10:58    TPro  6.1    /  Alb  2.4    /  TBili  0.4    /  DBili  x      /  AST  13     /  ALT  19     /  AlkPhos  109    02 Jul 2024 10:58      Urinalysis Basic - ( 02 Jul 2024 10:58 )    Color: x / Appearance: x / SG: x / pH: x  Gluc: 208 mg/dL / Ketone: x  / Bili: x / Urobili: x   Blood: x / Protein: x / Nitrite: x   Leuk Esterase: x / RBC: x / WBC x   Sq Epi: x / Non Sq Epi: x / Bacteria: x      CAPILLARY BLOOD GLUCOSE      POCT Blood Glucose.: 172 mg/dL (02 Jul 2024 12:17)  POCT Blood Glucose.: 192 mg/dL (02 Jul 2024 08:33)  POCT Blood Glucose.: 194 mg/dL (01 Jul 2024 21:11)  POCT Blood Glucose.: 145 mg/dL (01 Jul 2024 16:49)      RADIOLOGY & ADDITIONAL TESTS:    Notes Reviewed:  [x ] YES  [ ] NO    Care Discussed with Consultants/Other Providers [x ] YES  [ ] NO

## 2024-07-02 NOTE — PHARMACOTHERAPY INTERVENTION NOTE - COMMENTS
1. Indication for the vancomycin: R heel wound/osteomyelitis  2. Requesting Provider: Dr. Whitten  3. Current plan and dosing regimen: 1250 mg Q24H  4. Day of therapy: 28  5. Cultures: Tissue Cx 5/26/24: E faecalis + MRSA. Tissue Cx 6/4/24: ESBL E coli + E faecalis. R heel surgical swab 6/14/24: MRSA + citrobacter freundii + pseudomonas stutzeri.  6. Target trough or AUC/BAYLEE: 400-600 hr*ug/mL  7. Day and time level is due: 7/3 05:00  8. Previous levels: 6/4 (19:31): 22, 6/5 (05:47): 14, 6/6 (20:20): 14, 6/9 (04:58): 14.4, 6/11 (04:30): 15.7, 6/13 (05:54): 17.3, 6/14 (01:25): 15.2, 6/14 (04:30): 16.1, 6/15 (5:40): 17.5, 6/16 (5:38): 15.3, 6/17 (05:52): 13.2, 6/18 (05:50): 11.8, 6/19 (06:15): 21.3, 6/20 (08:52): 21.6, 6/21 (09:30): 37.5, 06/22 (0945): 11.1, 06/23 (09:30): 15.9, 06/24 (08:00): 19.6, 06/27 (21:47): 18.2, 6/29 (10:05): 36.4 (taken during infusion), 6/30 (07:10): 20.3  9. Expected 24-hour AUC: 427 hr*ug/mL

## 2024-07-02 NOTE — PROGRESS NOTE ADULT - ASSESSMENT
54 YO M with  AF on Eliquis, indwelling Aguilera, CAD s/p stents, CVA, DMT 2, Hypertension, osteomyelitis s/p debridement, PE, transferred from [facility] for difficulty in breathing.  His recent admission was for osteomyelitis and discharged on IV Vancomycin. Reported at [facility] he was given a dose of ertapenem and developed difficulty breathing followed by apnea. On ED arrival he was unresponsive, apneic, no palpable pulse or blood pressure, EKG rhythm HR < 20/min W/O P waves. Patient intubated,     RECOMMENDATIONS  1-Asystolic cardiac arrest, Acute hypoxic respiratory failure, Shock  -rec avoiding carbapenems and fine with just Vanco    2-Osteomyeliits right calcaneous  6/4 S/p Selective debridement of wound 04-Jun-2024 10:52:57  Parviz Todd  6/4  TCx MRSA, Escherichia coli ESBL  Right calcaneus bone pathology:  -   Fragments of bone with chronic osteomyelitis.  -   Focal acute osteomyelitis cannot be ruled out.  6/30 adjusted with pharmacy involvement dosing to vancomycin 1250 mg IV q24, dosing per pharmacy protocol and plan had been with inability to rule out osteo   rec 6 weeks so last day 7/18  PICC placed 6/7    3-Rash still has not seen dermatology so we have no diagnosis of chronic issue but has improved from recent acute blistering    From an ID standpoint no further requirement for inpatient status for the management of ID issues. Fine with discharge from ID standpoint when other medical issues no longer require inpatient care and social issues allow for a safe discharge plan. To schedule an outpatient ID follow up appointment please call our office at [phone]    Thank you for consulting us and involving us in the management of this most interesting and challenging case.  We will follow along in the care of this patient. Please call us at [phone] or text me directly on my cell# at [phone] with any concerns.

## 2024-07-02 NOTE — PROGRESS NOTE ADULT - SUBJECTIVE AND OBJECTIVE BOX
[facility] DIVISION of INFECTIOUS DISEASE  Juventino Whitten MD PhD, Symone Hickey MD, Anne-Marie Li MD, Jeffery Jacobs MD, Ryan Romeo MD  and providing coverage with Melody Armstrong MD  Providing Infectious Disease Consultations at [facility], [facility], [facility]'s    Office# [phone] to schedule follow up appointments  Answering Service for urgent calls or New Consults [phone]  Cell# to text for urgent issues Juventino Whitten [phone]     infectious diseases progress note:    SARAH MORRISON is a 54y y. o. Male patient    Overnight and events of the last 24hrs reviewed    Allergies    ertapenem (Blisters; Rash)  fish (Hives)    Intolerances        ANTIBIOTICS/RELEVANT:  antimicrobials  vancomycin  IVPB 1250 milliGRAM(s) IV Intermittent every 24 hours    immunologic:    OTHER:  acetaminophen     Tablet .. 650 milliGRAM(s) Oral every 6 hours PRN  apixaban 5 milliGRAM(s) Oral two times a day  ascorbic acid 500 milliGRAM(s) Oral daily  aspirin enteric coated 81 milliGRAM(s) Oral daily  atorvastatin 40 milliGRAM(s) Oral at bedtime  benzocaine 20% Spray 1 Spray(s) Topical every 6 hours  calamine/zinc oxide Lotion 1 Application(s) Topical two times a day  chlorhexidine 2% Cloths 1 Application(s) Topical daily  cholecalciferol 1000 Unit(s) Oral daily  clobetasol 0.05% Cream 1 Application(s) Topical two times a day  dextrose 10% Bolus 125 milliLiter(s) IV Bolus once  dextrose 5%. 1000 milliLiter(s) IV Continuous <Continuous>  dextrose 5%. 1000 milliLiter(s) IV Continuous <Continuous>  dextrose 50% Injectable 25 Gram(s) IV Push once  dextrose 50% Injectable 12.5 Gram(s) IV Push once  diltiazem    milliGRAM(s) Oral daily  diphenhydrAMINE 25 milliGRAM(s) Oral every 4 hours PRN  furosemide    Tablet 20 milliGRAM(s) Oral daily  gabapentin 100 milliGRAM(s) Oral every 12 hours  glucagon  Injectable 1 milliGRAM(s) IntraMuscular once  hydrOXYzine hydrochloride 25 milliGRAM(s) Oral two times a day  insulin lispro (ADMELOG) corrective regimen sliding scale   SubCutaneous three times a day before meals  insulin lispro (ADMELOG) corrective regimen sliding scale   SubCutaneous at bedtime  melatonin 5 milliGRAM(s) Oral at bedtime  metFORMIN 1000 milliGRAM(s) Oral two times a day  metoprolol succinate  milliGRAM(s) Oral two times a day  metoprolol tartrate Injectable 5 milliGRAM(s) IV Push every 6 hours PRN  multivitamin 1 Tablet(s) Oral daily  naloxegol 25 milliGRAM(s) Oral daily  ondansetron Injectable 4 milliGRAM(s) IV Push every 6 hours PRN  oxybutynin XL 10 milliGRAM(s) Oral daily  oxyCODONE    IR 10 milliGRAM(s) Oral every 6 hours PRN  oxyCODONE    IR 5 milliGRAM(s) Oral every 6 hours PRN  pantoprazole    Tablet 40 milliGRAM(s) Oral two times a day  polyethylene glycol 3350 17 Gram(s) Oral daily  potassium chloride    Tablet ER 10 milliEquivalent(s) Oral daily  predniSONE   Tablet 5 milliGRAM(s) Oral daily  senna 2 Tablet(s) Oral at bedtime  sodium chloride 0.65% Nasal 1 Spray(s) Both Nostrils three times a day PRN  sodium chloride 0.9% lock flush 10 milliLiter(s) IV Push every 1 hour PRN  sucralfate 1 Gram(s) Oral two times a day  tiotropium 2.5 MICROgram(s)/olodaterol 2.5 MICROgram(s) (STIOLTO) Inhaler 2 Puff(s) Inhalation daily      Objective:  Vital Signs Last 24 Hrs  T(C): 36.5 (02 Jul 2024 04:53), Max: 36.8 (01 Jul 2024 19:55)  T(F): 97.7 (02 Jul 2024 04:53), Max: 98.2 (01 Jul 2024 19:55)  HR: 83 (02 Jul 2024 04:53) (83 - 127)  BP: 101/73 (02 Jul 2024 04:53) (101/73 - 109/68)  BP(mean): --  RR: 19 (01 Jul 2024 19:55) (19 - 19)  SpO2: 96% (01 Jul 2024 19:55) (96% - 96%)    Parameters below as of 01 Jul 2024 19:55  Patient On (Oxygen Delivery Method): room air        T(C): 36.5 (07-02-24 @ 04:53), Max: 37.1 (06-30-24 @ 21:00)  T(C): 36.5 (07-02-24 @ 04:53), Max: 37.1 (06-30-24 @ 21:00)  T(C): 36.5 (07-02-24 @ 04:53), Max: 37.1 (06-30-24 @ 21:00)    PHYSICAL EXAM:  HEENT: NC atraumatic  Neck: supple  Respiratory: no accessory muscle use, breathing comfortably  Cardiovascular: distant  Gastrointestinal: normal appearing, nondistended  Extremities: no clubbing, no cyanosis,        LABS:        WBC  11.90 06-30 @ 07:10  10.71 06-28 @ 09:45  10.40 06-27 @ 06:22  11.48 06-26 @ 18:10              Creatinine: 1.20 mg/dL (06-30-24 @ 07:10)  Creatinine: 1.30 mg/dL (06-28-24 @ 09:45)  Creatinine: 1.10 mg/dL (06-27-24 @ 06:22)  Creatinine: 1.40 mg/dL (06-26-24 @ 18:10)                INFLAMMATORY MARKERS      MICROBIOLOGY:              RADIOLOGY & ADDITIONAL STUDIES:

## 2024-07-02 NOTE — CASE MANAGEMENT PROGRESS NOTE - NSCMPROGRESSNOTE_GEN_ALL_CORE
Patient remains admitted, secondary to medication adjustments. Per MD, possible plan for DC back to [facility] in next 24-48 hours if medically stable. SW following. CM will continue to follow.

## 2024-07-02 NOTE — PROGRESS NOTE ADULT - ASSESSMENT
Claude Villareal is a 54 YO M with past medical history of  AF on Eliquis, indwelling Aguilera, CAD s/p stents, CVA, DMT 2, Hypertension, osteomyelitis s/p debridement, PE, transferred from [facility] for difficulty in breathing.  His recent admission was for osteomyelitis and discharged on IV Vancomycin. On ED arrival he is unresponsive, apneic, no palpable pulse or blood pressure, EKG rhythm HR < 20/min W/O P waves. Patient intubated, Code ACLS activated, IV epinephrine, IV atropine administered, pulse became palpable, still Hypotensive, IV levophed administered. Admitted to ICU consulted,  Patient woke up and agitated try to pull out ETT,  IV propofol started,

## 2024-07-02 NOTE — PROGRESS NOTE ADULT - SUBJECTIVE AND OBJECTIVE BOX
[facility] -- Brittany Lutz, Reynaldo Sweeney Savella, , Gera Maya  Office # [phone]    Follow Up:      Subjective/Observations:     REVIEW OF SYSTEMS: All other review of systems is negative unless indicated above  PAST MEDICAL & SURGICAL HISTORY:  Diabetes      Diabetes mellitus with no complication      Afib      Hypertension      BPH (benign prostatic hyperplasia)      Perforated gastric ulcer  s/p emergent ex-lap omentopexy and plication 6/2019      Pulmonary embolism      History of non-ST elevation myocardial infarction (NSTEMI)      Osteomyelitis  s/p debridement      CAD S/P percutaneous coronary angioplasty      Cerebrovascular accident      H/O abdominal surgery      Perforated gastric ulcer      Traumatic amputation of left foot, initial encounter        MEDICATIONS  (STANDING):  apixaban 5 milliGRAM(s) Oral two times a day  ascorbic acid 500 milliGRAM(s) Oral daily  aspirin enteric coated 81 milliGRAM(s) Oral daily  atorvastatin 40 milliGRAM(s) Oral at bedtime  benzocaine 20% Spray 1 Spray(s) Topical every 6 hours  calamine/zinc oxide Lotion 1 Application(s) Topical two times a day  chlorhexidine 2% Cloths 1 Application(s) Topical daily  cholecalciferol 1000 Unit(s) Oral daily  clobetasol 0.05% Cream 1 Application(s) Topical two times a day  dextrose 10% Bolus 125 milliLiter(s) IV Bolus once  dextrose 5%. 1000 milliLiter(s) (50 mL/Hr) IV Continuous <Continuous>  dextrose 5%. 1000 milliLiter(s) (100 mL/Hr) IV Continuous <Continuous>  dextrose 50% Injectable 25 Gram(s) IV Push once  dextrose 50% Injectable 12.5 Gram(s) IV Push once  diltiazem    milliGRAM(s) Oral daily  furosemide    Tablet 20 milliGRAM(s) Oral daily  gabapentin 100 milliGRAM(s) Oral every 12 hours  glucagon  Injectable 1 milliGRAM(s) IntraMuscular once  hydrOXYzine hydrochloride 25 milliGRAM(s) Oral two times a day  insulin lispro (ADMELOG) corrective regimen sliding scale   SubCutaneous at bedtime  insulin lispro (ADMELOG) corrective regimen sliding scale   SubCutaneous three times a day before meals  melatonin 5 milliGRAM(s) Oral at bedtime  metFORMIN 1000 milliGRAM(s) Oral two times a day  metoprolol succinate  milliGRAM(s) Oral two times a day  multivitamin 1 Tablet(s) Oral daily  naloxegol 25 milliGRAM(s) Oral daily  oxybutynin XL 10 milliGRAM(s) Oral daily  pantoprazole    Tablet 40 milliGRAM(s) Oral two times a day  polyethylene glycol 3350 17 Gram(s) Oral daily  potassium chloride    Tablet ER 10 milliEquivalent(s) Oral daily  predniSONE   Tablet 5 milliGRAM(s) Oral daily  senna 2 Tablet(s) Oral at bedtime  sucralfate 1 Gram(s) Oral two times a day  tiotropium 2.5 MICROgram(s)/olodaterol 2.5 MICROgram(s) (STIOLTO) Inhaler 2 Puff(s) Inhalation daily  vancomycin  IVPB 1250 milliGRAM(s) IV Intermittent every 24 hours    MEDICATIONS  (PRN):  acetaminophen     Tablet .. 650 milliGRAM(s) Oral every 6 hours PRN Mild Pain (1 - 3)  diphenhydrAMINE 25 milliGRAM(s) Oral every 4 hours PRN Rash and/or Itching  metoprolol tartrate Injectable 5 milliGRAM(s) IV Push every 6 hours PRN HR >140  ondansetron Injectable 4 milliGRAM(s) IV Push every 6 hours PRN Nausea and/or Vomiting  oxyCODONE    IR 5 milliGRAM(s) Oral every 6 hours PRN Moderate Pain (4 - 6)  oxyCODONE    IR 10 milliGRAM(s) Oral every 6 hours PRN Severe Pain (7 - 10)  sodium chloride 0.65% Nasal 1 Spray(s) Both Nostrils three times a day PRN Nasal Congestion  sodium chloride 0.9% lock flush 10 milliLiter(s) IV Push every 1 hour PRN Pre/post blood products, medications, blood draw, and to maintain line patency    Allergies    ertapenem (Blisters; Rash)  fish (Hives)    Intolerances      Vital Signs Last 24 Hrs  T(C): 36.5 (02 Jul 2024 04:53), Max: 36.8 (01 Jul 2024 19:55)  T(F): 97.7 (02 Jul 2024 04:53), Max: 98.2 (01 Jul 2024 19:55)  HR: 83 (02 Jul 2024 04:53) (83 - 127)  BP: 101/73 (02 Jul 2024 04:53) (101/73 - 109/68)  BP(mean): --  RR: 19 (01 Jul 2024 19:55) (19 - 19)  SpO2: 96% (01 Jul 2024 19:55) (96% - 96%)    Parameters below as of 01 Jul 2024 19:55  Patient On (Oxygen Delivery Method): room air      I&O's Summary    01 Jul 2024 07:01  -  02 Jul 2024 07:00  --------------------------------------------------------  IN: 0 mL / OUT: 2000 mL / NET: -2000 mL        PHYSICAL EXAM:  TELE:   Constitutional: NAD, awake and alert, well-developed  HEENT: Moist Mucous Membranes, Anicteric  Pulmonary: Non-labored, breath sounds are clear bilaterally, No wheezing, rales or rhonchi  Cardiovascular: Regular, S1 and S2, No murmurs, rubs, gallops or clicks  Gastrointestinal: Bowel Sounds present, soft, nontender.   Lymph: No peripheral edema. No lymphadenopathy.  Skin: No visible rashes or ulcers.  Psych:  Mood & affect appropriate  LABS: All Labs Reviewed:                        11.7   11.90 )-----------( 335      ( 30 Jun 2024 07:10 )             37.4     30 Jun 2024 07:10    141    |  106    |  11     ----------------------------<  155    3.3     |  27     |  1.20     Ca    8.8        30 Jun 2024 07:10    TPro  5.8    /  Alb  2.4    /  TBili  0.5    /  DBili  x      /  AST  14     /  ALT  23     /  AlkPhos  96     30 Jun 2024 07:10          12 Lead ECG:   Ventricular Rate 126 BPM    QRS Duration 76 ms    Q-T Interval 302 ms    QTC Calculation(Bazett) 437 ms    R Axis -12 degrees    T Axis 103 degrees    Diagnosis Line Atrial fibrillation with rapid ventricular response  Low voltage QRS  Nonspecific ST and T wave abnormality  Abnormal ECG  When compared with ECG of 14-JUN-2024 11:03,  Questionable change in QRS axis  Nonspecific T wave abnormality now evident in Inferior leads  Nonspecific T wave abnormality, worse in Anterolateral leads  Confirmed by Juventino Soto MD ([id number]) on 6/20/2024 12:52:09 PM (06-20-24 @ 11:37)      TRANSTHORACIC ECHOCARDIOGRAM REPORT  ________________________________________________________________________________                                      _______       Pt. Name:       SARAH MORRISON Study Date:    6/14/2024  MRN:            [id number]         YOB: 1970  Accession #:    [id number]        Age:           53 years  Account#:       [id number]       Gender:        M  Visit ID#  Heart Rate:                      Height:        72.05 in (183.00 cm)  Rhythm:          Weight:        222.66 lb (101.00 kg)  Blood Pressure: 88/53 mmHg       BSA/BMI:       2.23 m² / 30.16 kg/m²  ________________________________________________________________________________________  Referring Physician:    [id number] Hill Brizuela  Interpreting Physician: Rahel Boss MD  Primary Sonographer:    Mounika FELDMAN    CPT:               ECHO TTE WO CON COMP W DOPP - 21934.m  Indication(s):     Heart failure, unspecified - I50.9  Procedure:         Transthoracic echocardiogram with 2-D, M-mode and complete                     spectral and color flow Doppler.  Ordering Location: ICU1  Admission Status:  Inpatient    _______________________________________________________________________________________     CONCLUSIONS:      1. Left ventricular cavity is normal in size. Left ventricular systolic function is normal with an ejection fraction visually estimated at 55 to 60 %. There are no regional wall motion abnormalities seen.   2. Moderate to severe left ventricular hypertrophy.   3. Normal right ventricular cavity size, with normal wall thickness, and normal systolic function.   4. The left atrium is severely dilated.   5. The right atrium is severely dilated.   6. Trileaflet aortic valve with normal systolic excursion. There is focal calcification of the aortic valve leaflets.   7. Mild to moderate mitral regurgitation.   8. There is moderate calcification of the mitral valve annulus.   9. Structurally normal tricuspid valve with normal leaflet excursion.Mild tricuspid regurgitation.  10. The inferior vena cava is normal in size measuring 1.70 cm in diameter, (normal <2.1cm) with normal inspiratory collapse (normal >50%) consistent with normal right atrial pressure (~3, range 0-5mmHg).  11. Estimatedpulmonary artery systolic pressure is 43 mmHg.    ________________________________________________________________________________________  FINDINGS:     Left Ventricle:  The left ventricular cavity is normal in size. Left ventricular systolic function is normal with an ejection fraction visually estimated at 55 to 60%. There are no regional wall motion abnormalities seen. There is normal left ventricular diastolic function, with normal filling pressure. Moderate to severe left ventricular hypertrophy.     Right Ventricle:  The right ventricular cavity is normal in size, with normal wall thickness and normal systolic function.     Left Atrium:  The left atrium is severely dilated.     Right Atrium:  The right atrium is severely dilated.     Aortic Valve:  The aortic valve appears trileaflet with normal systolic excursion. There is focal calcification of the aortic valve leaflets. There is no aortic valve stenosis. There is no evidence of aortic regurgitation.     Mitral Valve:  There is moderate calcification of the mitral valve annulus. There is mild to moderate mitral regurgitation.     Tricuspid Valve:  Structurally normal tricuspid valve with normal leaflet excursion. There is mild tricuspid regurgitation. Estimated pulmonary artery systolic pressure is 43 mmHg.     Pulmonic Valve:  The pulmonic valve was not well visualized.     Systemic Veins:  The inferior vena cava is normal in size measuring 1.70 cm in diameter, (normal <2.1cm) with normal inspiratory collapse (normal >50%) consistent with normal right atrial pressure (~3, range 0-5mmHg).  ____________________________________________________________________  QUANTITATIVE DATA:  Left Ventricle Measurements: (Indexed to BSA)     IVSd (2D):   1.7 cm  LVPWd (2D):  1.4 cm  LVIDd (2D):  4.3 cm  LVIDs (2D):  3.0 cm  LV Mass:     281 g  125.9 g/m²  Visualized LV EF%: 55 to 60%     MV E Vmax:    1.07 m/s  MV A Vmax:    0.00 m/s  e' lateral:   9.68 cm/s  e' medial:    10.90 cm/s  E/e' lateral: 11.05  E/e' medial:  9.82  E/e' Average: 10.40  MV DT:        180 msec    Aorta Measurements: (Normal range) (Indexed to BSA)     Sinuses of Valsalva: 3.40 cm (3.1 - 3.7 cm)       Left Atrium Measurements: (Indexed to BSA)  LA Diam 2D: 4.00 cm       LVOT / RVOT/ Qp/Qs Data: (Indexed to BSA)  LVOT Diameter: 2.10 cm  LVOT Area:     3.46 cm²    Mitral Valve Measurements:     MV E Vmax: 1.1 m/s  MV A Vmax: 0.0 m/s       Tricuspid Valve Measurements:     TR Vmax:          3.2 m/s  TR Peak Gradient: 40.4 mmHg  RA Pressure:      3 mmHg  PASP:             43 mmHg    ________________________________________________________________________________________  Electronically signed on 6/14/2024 at 12:08:59 PM by Rahel Boss MD         *** Final ***      [facility] -- Brittany Lutz, Zahra, Román Jacobs, , Gera Maya  Office # [phone]    Follow Up:    s/p Cardiac Arrest, Afib with SVR, Diastolic HF    Subjective/Observations: Asleep but easily arousable to name calling.  Remains comfortable on RA.  Denies any form of respiratory or cardiac discomfort     REVIEW OF SYSTEMS: All other review of systems is negative unless indicated above  PAST MEDICAL & SURGICAL HISTORY:  Diabetes      Diabetes mellitus with no complication      Afib      Hypertension      BPH (benign prostatic hyperplasia)      Perforated gastric ulcer  s/p emergent ex-lap omentopexy and plication 6/2019      Pulmonary embolism      History of non-ST elevation myocardial infarction (NSTEMI)      Osteomyelitis  s/p debridement      CAD S/P percutaneous coronary angioplasty      Cerebrovascular accident      H/O abdominal surgery      Perforated gastric ulcer      Traumatic amputation of left foot, initial encounter        MEDICATIONS  (STANDING):  apixaban 5 milliGRAM(s) Oral two times a day  ascorbic acid 500 milliGRAM(s) Oral daily  aspirin enteric coated 81 milliGRAM(s) Oral daily  atorvastatin 40 milliGRAM(s) Oral at bedtime  benzocaine 20% Spray 1 Spray(s) Topical every 6 hours  calamine/zinc oxide Lotion 1 Application(s) Topical two times a day  chlorhexidine 2% Cloths 1 Application(s) Topical daily  cholecalciferol 1000 Unit(s) Oral daily  clobetasol 0.05% Cream 1 Application(s) Topical two times a day  dextrose 10% Bolus 125 milliLiter(s) IV Bolus once  dextrose 5%. 1000 milliLiter(s) (50 mL/Hr) IV Continuous <Continuous>  dextrose 5%. 1000 milliLiter(s) (100 mL/Hr) IV Continuous <Continuous>  dextrose 50% Injectable 25 Gram(s) IV Push once  dextrose 50% Injectable 12.5 Gram(s) IV Push once  diltiazem    milliGRAM(s) Oral daily  furosemide    Tablet 20 milliGRAM(s) Oral daily  gabapentin 100 milliGRAM(s) Oral every 12 hours  glucagon  Injectable 1 milliGRAM(s) IntraMuscular once  hydrOXYzine hydrochloride 25 milliGRAM(s) Oral two times a day  insulin lispro (ADMELOG) corrective regimen sliding scale   SubCutaneous at bedtime  insulin lispro (ADMELOG) corrective regimen sliding scale   SubCutaneous three times a day before meals  melatonin 5 milliGRAM(s) Oral at bedtime  metFORMIN 1000 milliGRAM(s) Oral two times a day  metoprolol succinate  milliGRAM(s) Oral two times a day  multivitamin 1 Tablet(s) Oral daily  naloxegol 25 milliGRAM(s) Oral daily  oxybutynin XL 10 milliGRAM(s) Oral daily  pantoprazole    Tablet 40 milliGRAM(s) Oral two times a day  polyethylene glycol 3350 17 Gram(s) Oral daily  potassium chloride    Tablet ER 10 milliEquivalent(s) Oral daily  predniSONE   Tablet 5 milliGRAM(s) Oral daily  senna 2 Tablet(s) Oral at bedtime  sucralfate 1 Gram(s) Oral two times a day  tiotropium 2.5 MICROgram(s)/olodaterol 2.5 MICROgram(s) (STIOLTO) Inhaler 2 Puff(s) Inhalation daily  vancomycin  IVPB 1250 milliGRAM(s) IV Intermittent every 24 hours    MEDICATIONS  (PRN):  acetaminophen     Tablet .. 650 milliGRAM(s) Oral every 6 hours PRN Mild Pain (1 - 3)  diphenhydrAMINE 25 milliGRAM(s) Oral every 4 hours PRN Rash and/or Itching  metoprolol tartrate Injectable 5 milliGRAM(s) IV Push every 6 hours PRN HR >140  ondansetron Injectable 4 milliGRAM(s) IV Push every 6 hours PRN Nausea and/or Vomiting  oxyCODONE    IR 5 milliGRAM(s) Oral every 6 hours PRN Moderate Pain (4 - 6)  oxyCODONE    IR 10 milliGRAM(s) Oral every 6 hours PRN Severe Pain (7 - 10)  sodium chloride 0.65% Nasal 1 Spray(s) Both Nostrils three times a day PRN Nasal Congestion  sodium chloride 0.9% lock flush 10 milliLiter(s) IV Push every 1 hour PRN Pre/post blood products, medications, blood draw, and to maintain line patency    Allergies    ertapenem (Blisters; Rash)  fish (Hives)    Intolerances      Vital Signs Last 24 Hrs  T(C): 36.5 (02 Jul 2024 04:53), Max: 36.8 (01 Jul 2024 19:55)  T(F): 97.7 (02 Jul 2024 04:53), Max: 98.2 (01 Jul 2024 19:55)  HR: 83 (02 Jul 2024 04:53) (83 - 127)  BP: 101/73 (02 Jul 2024 04:53) (101/73 - 109/68)  BP(mean): --  RR: 19 (01 Jul 2024 19:55) (19 - 19)  SpO2: 96% (01 Jul 2024 19:55) (96% - 96%)    Parameters below as of 01 Jul 2024 19:55  Patient On (Oxygen Delivery Method): room air      I&O's Summary    01 Jul 2024 07:01  -  02 Jul 2024 07:00  --------------------------------------------------------  IN: 0 mL / OUT: 2000 mL / NET: -2000 mL    PHYSICAL EXAM:  TELE: Not on tele  Constitutional: NAD, awake and alert  HEENT: Moist Mucous Membranes, Anicteric  Pulmonary: Non-labored, breath sounds are clear but diminished bilaterally, No wheezing, rales or rhonchi  Cardiovascular: IRRR, S1 and S2, +murmurs, no rubs, gallops or clicks  Gastrointestinal: Bowel Sounds present, soft, nontender.   Lymph: No peripheral edema. No lymphadenopathy.  Skin: Generalized rashes.  +skin breakdown on sacrum.  Left heel ulcer with dressing  Psych:  Mood & affect appropriate    LABS: All Labs Reviewed:                        11.7   11.90 )-----------( 335      ( 30 Jun 2024 07:10 )             37.4     30 Jun 2024 07:10    141    |  106    |  11     ----------------------------<  155    3.3     |  27     |  1.20     Ca    8.8        30 Jun 2024 07:10    TPro  5.8    /  Alb  2.4    /  TBili  0.5    /  DBili  x      /  AST  14     /  ALT  23     /  AlkPhos  96     30 Jun 2024 07:10          12 Lead ECG:   Ventricular Rate 126 BPM    QRS Duration 76 ms    Q-T Interval 302 ms    QTC Calculation(Bazett) 437 ms    R Axis -12 degrees    T Axis 103 degrees    Diagnosis Line Atrial fibrillation with rapid ventricular response  Low voltage QRS  Nonspecific ST and T wave abnormality  Abnormal ECG  When compared with ECG of 14-JUN-2024 11:03,  Questionable change in QRS axis  Nonspecific T wave abnormality now evident in Inferior leads  Nonspecific T wave abnormality, worse in Anterolateral leads  Confirmed by Juventino Soto MD ([id number]) on 6/20/2024 12:52:09 PM (06-20-24 @ 11:37)      TRANSTHORACIC ECHOCARDIOGRAM REPORT  ________________________________________________________________________________                                      _______       Pt. Name:       SARAH MORRISON Study Date:    6/14/2024  MRN:            [id number]         YOB: 1970  Accession #:    [id number]        Age:           53 years  Account#:       [id number]       Gender:        M  Visit ID#  Heart Rate:                      Height:        72.05 in (183.00 cm)  Rhythm:          Weight:        222.66 lb (101.00 kg)  Blood Pressure: 88/53 mmHg       BSA/BMI:       2.23 m² / 30.16 kg/m²  ________________________________________________________________________________________  Referring Physician:    [id number] Hill Brizuela  Interpreting Physician: Rahel Boss MD  Primary Sonographer:    Mounika FELDMAN    CPT:               ECHO TTE WO CON COMP W DOPP - 73984.m  Indication(s):     Heart failure, unspecified - I50.9  Procedure:         Transthoracic echocardiogram with 2-D, M-mode and complete                     spectral and color flow Doppler.  Ordering Location[facility]  Admission Status:  Inpatient    _______________________________________________________________________________________     CONCLUSIONS:      1. Left ventricular cavity is normal in size. Left ventricular systolic function is normal with an ejection fraction visually estimated at 55 to 60 %. There are no regional wall motion abnormalities seen.   2. Moderate to severe left ventricular hypertrophy.   3. Normal right ventricular cavity size, with normal wall thickness, and normal systolic function.   4. The left atrium is severely dilated.   5. The right atrium is severely dilated.   6. Trileaflet aortic valve with normal systolic excursion. There is focal calcification of the aortic valve leaflets.   7. Mild to moderate mitral regurgitation.   8. There is moderate calcification of the mitral valve annulus.   9. Structurally normal tricuspid valve with normal leaflet excursion.Mild tricuspid regurgitation.  10. The inferior vena cava is normal in size measuring 1.70 cm in diameter, (normal <2.1cm) with normal inspiratory collapse (normal >50%) consistent with normal right atrial pressure (~3, range 0-5mmHg).  11. Estimatedpulmonary artery systolic pressure is 43 mmHg.    ________________________________________________________________________________________  FINDINGS:     Left Ventricle:  The left ventricular cavity is normal in size. Left ventricular systolic function is normal with an ejection fraction visually estimated at 55 to 60%. There are no regional wall motion abnormalities seen. There is normal left ventricular diastolic function, with normal filling pressure. Moderate to severe left ventricular hypertrophy.     Right Ventricle:  The right ventricular cavity is normal in size, with normal wall thickness and normal systolic function.     Left Atrium:  The left atrium is severely dilated.     Right Atrium:  The right atrium is severely dilated.     Aortic Valve:  The aortic valve appears trileaflet with normal systolic excursion. There is focal calcification of the aortic valve leaflets. There is no aortic valve stenosis. There is no evidence of aortic regurgitation.     Mitral Valve:  There is moderate calcification of the mitral valve annulus. There is mild to moderate mitral regurgitation.     Tricuspid Valve:  Structurally normal tricuspid valve with normal leaflet excursion. There is mild tricuspid regurgitation. Estimated pulmonary artery systolic pressure is 43 mmHg.     Pulmonic Valve:  The pulmonic valve was not well visualized.     Systemic Veins:  The inferior vena cava is normal in size measuring 1.70 cm in diameter, (normal <2.1cm) with normal inspiratory collapse (normal >50%) consistent with normal right atrial pressure (~3, range 0-5mmHg).  ____________________________________________________________________  QUANTITATIVE DATA:  Left Ventricle Measurements: (Indexed to BSA)     IVSd (2D):   1.7 cm  LVPWd (2D):  1.4 cm  LVIDd (2D):  4.3 cm  LVIDs (2D):  3.0 cm  LV Mass:     281 g  125.9 g/m²  Visualized LV EF%: 55 to 60%     MV E Vmax:    1.07 m/s  MV A Vmax:    0.00 m/s  e' lateral:   9.68 cm/s  e' medial:    10.90 cm/s  E/e' lateral: 11.05  E/e' medial:  9.82  E/e' Average: 10.40  MV DT:        180 msec    Aorta Measurements: (Normal range) (Indexed to BSA)     Sinuses of Valsalva: 3.40 cm (3.1 - 3.7 cm)       Left Atrium Measurements: (Indexed to BSA)  LA Diam 2D: 4.00 cm       LVOT / RVOT/ Qp/Qs Data: (Indexed to BSA)  LVOT Diameter: 2.10 cm  LVOT Area:     3.46 cm²    Mitral Valve Measurements:     MV E Vmax: 1.1 m/s  MV A Vmax: 0.0 m/s       Tricuspid Valve Measurements:     TR Vmax:          3.2 m/s  TR Peak Gradient: 40.4 mmHg  RA Pressure:      3 mmHg  PASP:             43 mmHg    ________________________________________________________________________________________  Electronically signed on 6/14/2024 at 12:08:59 PM by Rahel Boss MD         *** Final ***      [facility] -- Brittany Lutz, Zahra, Román Jacobs, , Gera Maya  Office # [phone]    Follow Up:    s/p Cardiac Arrest, Afib with SVR, Diastolic HF    Subjective/Observations: Asleep but easily arousable to name calling.  Remains comfortable on RA.  Denies any form of respiratory or cardiac discomfort     REVIEW OF SYSTEMS: All other review of systems is negative unless indicated above  PAST MEDICAL & SURGICAL HISTORY:  Diabetes      Diabetes mellitus with no complication      Afib      Hypertension      BPH (benign prostatic hyperplasia)      Perforated gastric ulcer  s/p emergent ex-lap omentopexy and plication 6/2019      Pulmonary embolism      History of non-ST elevation myocardial infarction (NSTEMI)      Osteomyelitis  s/p debridement      CAD S/P percutaneous coronary angioplasty      Cerebrovascular accident      H/O abdominal surgery      Perforated gastric ulcer      Traumatic amputation of left foot, initial encounter        MEDICATIONS  (STANDING):  apixaban 5 milliGRAM(s) Oral two times a day  ascorbic acid 500 milliGRAM(s) Oral daily  aspirin enteric coated 81 milliGRAM(s) Oral daily  atorvastatin 40 milliGRAM(s) Oral at bedtime  benzocaine 20% Spray 1 Spray(s) Topical every 6 hours  calamine/zinc oxide Lotion 1 Application(s) Topical two times a day  chlorhexidine 2% Cloths 1 Application(s) Topical daily  cholecalciferol 1000 Unit(s) Oral daily  clobetasol 0.05% Cream 1 Application(s) Topical two times a day  dextrose 10% Bolus 125 milliLiter(s) IV Bolus once  dextrose 5%. 1000 milliLiter(s) (50 mL/Hr) IV Continuous <Continuous>  dextrose 5%. 1000 milliLiter(s) (100 mL/Hr) IV Continuous <Continuous>  dextrose 50% Injectable 25 Gram(s) IV Push once  dextrose 50% Injectable 12.5 Gram(s) IV Push once  diltiazem    milliGRAM(s) Oral daily  furosemide    Tablet 20 milliGRAM(s) Oral daily  gabapentin 100 milliGRAM(s) Oral every 12 hours  glucagon  Injectable 1 milliGRAM(s) IntraMuscular once  hydrOXYzine hydrochloride 25 milliGRAM(s) Oral two times a day  insulin lispro (ADMELOG) corrective regimen sliding scale   SubCutaneous at bedtime  insulin lispro (ADMELOG) corrective regimen sliding scale   SubCutaneous three times a day before meals  melatonin 5 milliGRAM(s) Oral at bedtime  metFORMIN 1000 milliGRAM(s) Oral two times a day  metoprolol succinate  milliGRAM(s) Oral two times a day  multivitamin 1 Tablet(s) Oral daily  naloxegol 25 milliGRAM(s) Oral daily  oxybutynin XL 10 milliGRAM(s) Oral daily  pantoprazole    Tablet 40 milliGRAM(s) Oral two times a day  polyethylene glycol 3350 17 Gram(s) Oral daily  potassium chloride    Tablet ER 10 milliEquivalent(s) Oral daily  predniSONE   Tablet 5 milliGRAM(s) Oral daily  senna 2 Tablet(s) Oral at bedtime  sucralfate 1 Gram(s) Oral two times a day  tiotropium 2.5 MICROgram(s)/olodaterol 2.5 MICROgram(s) (STIOLTO) Inhaler 2 Puff(s) Inhalation daily  vancomycin  IVPB 1250 milliGRAM(s) IV Intermittent every 24 hours    MEDICATIONS  (PRN):  acetaminophen     Tablet .. 650 milliGRAM(s) Oral every 6 hours PRN Mild Pain (1 - 3)  diphenhydrAMINE 25 milliGRAM(s) Oral every 4 hours PRN Rash and/or Itching  metoprolol tartrate Injectable 5 milliGRAM(s) IV Push every 6 hours PRN HR >140  ondansetron Injectable 4 milliGRAM(s) IV Push every 6 hours PRN Nausea and/or Vomiting  oxyCODONE    IR 5 milliGRAM(s) Oral every 6 hours PRN Moderate Pain (4 - 6)  oxyCODONE    IR 10 milliGRAM(s) Oral every 6 hours PRN Severe Pain (7 - 10)  sodium chloride 0.65% Nasal 1 Spray(s) Both Nostrils three times a day PRN Nasal Congestion  sodium chloride 0.9% lock flush 10 milliLiter(s) IV Push every 1 hour PRN Pre/post blood products, medications, blood draw, and to maintain line patency    Allergies    ertapenem (Blisters; Rash)  fish (Hives)    Intolerances      Vital Signs Last 24 Hrs  T(C): 36.5 (02 Jul 2024 04:53), Max: 36.8 (01 Jul 2024 19:55)  T(F): 97.7 (02 Jul 2024 04:53), Max: 98.2 (01 Jul 2024 19:55)  HR: 83 (02 Jul 2024 04:53) (83 - 127)  BP: 101/73 (02 Jul 2024 04:53) (101/73 - 109/68)  BP(mean): --  RR: 19 (01 Jul 2024 19:55) (19 - 19)  SpO2: 96% (01 Jul 2024 19:55) (96% - 96%)    Parameters below as of 01 Jul 2024 19:55  Patient On (Oxygen Delivery Method): room air      I&O's Summary    01 Jul 2024 07:01  -  02 Jul 2024 07:00  --------------------------------------------------------  IN: 0 mL / OUT: 2000 mL / NET: -2000 mL    PHYSICAL EXAM:  TELE: Not on tele  Constitutional: NAD, awake and alert  HEENT: Moist Mucous Membranes, Anicteric  Pulmonary: Non-labored, breath sounds are clear but diminished bilaterally, No wheezing, rales or rhonchi  Cardiovascular: IRRR, S1 and S2, +murmurs, no rubs, gallops or clicks  Gastrointestinal: Bowel Sounds present, soft, nontender.   Lymph: No peripheral edema. No lymphadenopathy.  Skin: Generalized rashes.  +skin breakdown on sacrum.  Left heel ulcer with dressing  Psych:  Mood & affect appropriate    LABS: All Labs Reviewed:                        11.7   11.90 )-----------( 335      ( 30 Jun 2024 07:10 )             37.4     30 Jun 2024 07:10    141    |  106    |  11     ----------------------------<  155    3.3     |  27     |  1.20     Ca    8.8        30 Jun 2024 07:10    TPro  5.8    /  Alb  2.4    /  TBili  0.5    /  DBili  x      /  AST  14     /  ALT  23     /  AlkPhos  96     30 Jun 2024 07:10          12 Lead ECG:   Ventricular Rate 126 BPM    QRS Duration 76 ms    Q-T Interval 302 ms    QTC Calculation(Bazett) 437 ms    R Axis -12 degrees    T Axis 103 degrees    Diagnosis Line Atrial fibrillation with rapid ventricular response  Low voltage QRS  Nonspecific ST and T wave abnormality  Abnormal ECG  When compared with ECG of 14-JUN-2024 11:03,  Questionable change in QRS axis  Nonspecific T wave abnormality now evident in Inferior leads  Nonspecific T wave abnormality, worse in Anterolateral leads  Confirmed by Juventino Soto MD ([id number]) on 6/20/2024 12:52:09 PM (06-20-24 @ 11:37)      TRANSTHORACIC ECHOCARDIOGRAM REPORT  ________________________________________________________________________________                                      _______       Pt. Name:       SARAH MORRISON Study Date:    6/14/2024  MRN:            [id number]         YOB: 1970  Accession #:    [id number]        Age:           53 years  Account#:       [id number]       Gender:        M  Visit ID#  Heart Rate:                      Height:        72.05 in (183.00 cm)  Rhythm:          Weight:        222.66 lb (101.00 kg)  Blood Pressure: 88/53 mmHg       BSA/BMI:       2.23 m² / 30.16 kg/m²  ________________________________________________________________________________________  Referring Physician:    [id number] Hill Brizuela  Interpreting Physician: Rahel Boss MD  Primary Sonographer:    Mounika FELDMAN    CPT:               ECHO TTE WO CON COMP W DOPP - 98903.m  Indication(s):     Heart failure, unspecified - I50.9  Procedure:         Transthoracic echocardiogram with 2-D, M-mode and complete                     spectral and color flow Doppler.  Ordering Location: [facility]  Admission Status:  Inpatient    _______________________________________________________________________________________     CONCLUSIONS:      1. Left ventricular cavity is normal in size. Left ventricular systolic function is normal with an ejection fraction visually estimated at 55 to 60 %. There are no regional wall motion abnormalities seen.   2. Moderate to severe left ventricular hypertrophy.   3. Normal right ventricular cavity size, with normal wall thickness, and normal systolic function.   4. The left atrium is severely dilated.   5. The right atrium is severely dilated.   6. Trileaflet aortic valve with normal systolic excursion. There is focal calcification of the aortic valve leaflets.   7. Mild to moderate mitral regurgitation.   8. There is moderate calcification of the mitral valve annulus.   9. Structurally normal tricuspid valve with normal leaflet excursion.Mild tricuspid regurgitation.  10. The inferior vena cava is normal in size measuring 1.70 cm in diameter, (normal <2.1cm) with normal inspiratory collapse (normal >50%) consistent with normal right atrial pressure (~3, range 0-5mmHg).  11. Estimatedpulmonary artery systolic pressure is 43 mmHg.    ________________________________________________________________________________________  FINDINGS:     Left Ventricle:  The left ventricular cavity is normal in size. Left ventricular systolic function is normal with an ejection fraction visually estimated at 55 to 60%. There are no regional wall motion abnormalities seen. There is normal left ventricular diastolic function, with normal filling pressure. Moderate to severe left ventricular hypertrophy.     Right Ventricle:  The right ventricular cavity is normal in size, with normal wall thickness and normal systolic function.     Left Atrium:  The left atrium is severely dilated.     Right Atrium:  The right atrium is severely dilated.     Aortic Valve:  The aortic valve appears trileaflet with normal systolic excursion. There is focal calcification of the aortic valve leaflets. There is no aortic valve stenosis. There is no evidence of aortic regurgitation.     Mitral Valve:  There is moderate calcification of the mitral valve annulus. There is mild to moderate mitral regurgitation.     Tricuspid Valve:  Structurally normal tricuspid valve with normal leaflet excursion. There is mild tricuspid regurgitation. Estimated pulmonary artery systolic pressure is 43 mmHg.     Pulmonic Valve:  The pulmonic valve was not well visualized.     Systemic Veins:  The inferior vena cava is normal in size measuring 1.70 cm in diameter, (normal <2.1cm) with normal inspiratory collapse (normal >50%) consistent with normal right atrial pressure (~3, range 0-5mmHg).  ____________________________________________________________________  QUANTITATIVE DATA:  Left Ventricle Measurements: (Indexed to BSA)     IVSd (2D):   1.7 cm  LVPWd (2D):  1.4 cm  LVIDd (2D):  4.3 cm  LVIDs (2D):  3.0 cm  LV Mass:     281 g  125.9 g/m²  Visualized LV EF%: 55 to 60%     MV E Vmax:    1.07 m/s  MV A Vmax:    0.00 m/s  e' lateral:   9.68 cm/s  e' medial:    10.90 cm/s  E/e' lateral: 11.05  E/e' medial:  9.82  E/e' Average: 10.40  MV DT:        180 msec    Aorta Measurements: (Normal range) (Indexed to BSA)     Sinuses of Valsalva: 3.40 cm (3.1 - 3.7 cm)       Left Atrium Measurements: (Indexed to BSA)  LA Diam 2D: 4.00 cm       LVOT / RVOT/ Qp/Qs Data: (Indexed to BSA)  LVOT Diameter: 2.10 cm  LVOT Area:     3.46 cm²    Mitral Valve Measurements:     MV E Vmax: 1.1 m/s  MV A Vmax: 0.0 m/s       Tricuspid Valve Measurements:     TR Vmax:          3.2 m/s  TR Peak Gradient: 40.4 mmHg  RA Pressure:      3 mmHg  PASP:             43 mmHg    ________________________________________________________________________________________  Electronically signed on 6/14/2024 at 12:08:59 PM by Rahel Boss MD         *** Final ***

## 2024-07-02 NOTE — PROGRESS NOTE ADULT - ASSESSMENT
52 YO M with past medical history of AFib (on Eliquis), indwelling Aguilera, cerebral aneurysm, CAD (s/p stents), CVA, T2DM, HTN, OM (s/p debridement), PE, perforated gastric ulcer s/p omentopexy 2019 with Dr. Mejia, transferred from [facility] for difficulty in breathing s/p cardiac arrest    CAD, Afib, s/p Cardiac Arrest, Mod-Severe MR, HTN  - s/p cardiac arrest x2 w/ AHRF.  s/p intubation with successful extubation.   - Still tolerating RA with no evidence of volume overload    - Trops + but no sig trend. No clear evidence of acute ischemia  - Continue ASA and statin      - Known with chronic AFib with controlled rates overnight with 3 beats of aberrantly conducted WCT.  No further.  D/C tele  - Continue long-acting Cardizem and Metpprolol with more lenient parameter.  Would allow HR of 100s-110  - Continue Eliquis    - TTE normal EF and mod-severe MR  - No sign of volume overload on exam  - Continue  home Lasix 20mg PO daily    - ATN with creat normalized  - With diffuse rashes +pruritus.  ID following    - Monitor and replete lytes, keep K>4, Mg>2.  - Will continue to follow.    Henrietta Kim DNP, NP-C, AGACNP-C  Cardiology   Call TEAMS     54 YO M with past medical history of AFib (on Eliquis), indwelling Aguilera, cerebral aneurysm, CAD (s/p stents), CVA, T2DM, HTN, OM (s/p debridement), PE, perforated gastric ulcer s/p omentopexy 2019 with Dr. Mejia, transferred from [facility] for difficulty in breathing s/p cardiac arrest    CAD, Afib, s/p Cardiac Arrest, Mod-Severe MR, HTN  - s/p cardiac arrest x2 w/ AHRF.  s/p intubation with successful extubation.   - Still tolerating RA with no evidence of volume overload    - Trops + but no sig trend. No clear evidence of acute ischemia  - Continue ASA and statin      - Known with chronic AFib with controlled rates overnight with 3 beats of aberrantly conducted WCT.  No further.  D/C tele  - Continue long-acting Cardizem and Metoprolol with more lenient parameter.  Would allow HR of 100s-110  - Continue Eliquis    - TTE normal EF and mod-severe MR  - No sign of volume overload on exam  - Continue  home Lasix 20mg PO daily    - ATN with creat normalized  - With diffuse rashes +pruritus.  ID following    - Monitor and replete lytes, keep K>4, Mg>2.  - Will continue to follow.    Henrietta Kim DNP, NP-C, AGACNP-C  Cardiology   Call TEAMS

## 2024-07-03 LAB
GLUCOSE BLDC GLUCOMTR-MCNC: 126 MG/DL — HIGH (ref 70–99)
GLUCOSE BLDC GLUCOMTR-MCNC: 142 MG/DL — HIGH (ref 70–99)
GLUCOSE BLDC GLUCOMTR-MCNC: 198 MG/DL — HIGH (ref 70–99)
GLUCOSE BLDC GLUCOMTR-MCNC: 228 MG/DL — HIGH (ref 70–99)
VANCOMYCIN TROUGH SERPL-MCNC: 14.2 UG/ML — SIGNIFICANT CHANGE UP (ref 10–20)

## 2024-07-03 PROCEDURE — 73630 X-RAY EXAM OF FOOT: CPT | Mod: 26,RT

## 2024-07-03 PROCEDURE — 99232 SBSQ HOSP IP/OBS MODERATE 35: CPT

## 2024-07-03 RX ADMIN — Medication 1 GRAM(S): at 17:58

## 2024-07-03 RX ADMIN — Medication 2 TABLET(S): at 22:33

## 2024-07-03 RX ADMIN — Medication 1 GRAM(S): at 06:15

## 2024-07-03 RX ADMIN — Medication 500 MILLIGRAM(S): at 12:40

## 2024-07-03 RX ADMIN — INSULIN LISPRO 4: 100 INJECTION, SOLUTION SUBCUTANEOUS at 12:55

## 2024-07-03 RX ADMIN — ATORVASTATIN CALCIUM 40 MILLIGRAM(S): 20 TABLET, FILM COATED ORAL at 22:33

## 2024-07-03 RX ADMIN — PANTOPRAZOLE SODIUM 40 MILLIGRAM(S): 40 INJECTION, POWDER, FOR SOLUTION INTRAVENOUS at 18:07

## 2024-07-03 RX ADMIN — Medication 100 MILLIGRAM(S): at 17:57

## 2024-07-03 RX ADMIN — APIXABAN 5 MILLIGRAM(S): 5 TABLET, FILM COATED ORAL at 06:15

## 2024-07-03 RX ADMIN — Medication 10 MILLIGRAM(S): at 12:59

## 2024-07-03 RX ADMIN — VANCOMYCIN HYDROCHLORIDE 166.67 MILLIGRAM(S): KIT at 06:14

## 2024-07-03 RX ADMIN — Medication 5 MILLIGRAM(S): at 22:33

## 2024-07-03 RX ADMIN — OXYCODONE HYDROCHLORIDE 10 MILLIGRAM(S): 100 SOLUTION ORAL at 23:20

## 2024-07-03 RX ADMIN — METFORMIN HYDROCHLORIDE 1000 MILLIGRAM(S): 850 TABLET, FILM COATED ORAL at 06:17

## 2024-07-03 RX ADMIN — Medication 100 MILLIGRAM(S): at 17:58

## 2024-07-03 RX ADMIN — POLYETHYLENE GLYCOL 3350 17 GRAM(S): 1 POWDER ORAL at 12:40

## 2024-07-03 RX ADMIN — POTASSIUM CHLORIDE 10 MILLIEQUIVALENT(S): 600 TABLET, FILM COATED, EXTENDED RELEASE ORAL at 17:58

## 2024-07-03 RX ADMIN — DILTIAZEM HYDROCHLORIDE 360 MILLIGRAM(S): 240 CAPSULE, EXTENDED RELEASE ORAL at 06:15

## 2024-07-03 RX ADMIN — HYDROXYZINE PAMOATE 25 MILLIGRAM(S): 50 CAPSULE ORAL at 17:58

## 2024-07-03 RX ADMIN — PANTOPRAZOLE SODIUM 40 MILLIGRAM(S): 40 INJECTION, POWDER, FOR SOLUTION INTRAVENOUS at 06:15

## 2024-07-03 RX ADMIN — PREDNISONE 10 MILLIGRAM(S): 10 TABLET ORAL at 06:14

## 2024-07-03 RX ADMIN — Medication 100 MILLIGRAM(S): at 06:15

## 2024-07-03 RX ADMIN — Medication 1 TABLET(S): at 12:40

## 2024-07-03 RX ADMIN — Medication 25 MILLIGRAM(S): at 22:34

## 2024-07-03 RX ADMIN — Medication 100 MILLIGRAM(S): at 06:14

## 2024-07-03 RX ADMIN — NALOXEGOL OXALATE 25 MILLIGRAM(S): 25 TABLET, FILM COATED ORAL at 12:59

## 2024-07-03 RX ADMIN — FUROSEMIDE 20 MILLIGRAM(S): 10 INJECTION, SOLUTION INTRAMUSCULAR; INTRAVENOUS at 06:15

## 2024-07-03 RX ADMIN — METFORMIN HYDROCHLORIDE 1000 MILLIGRAM(S): 850 TABLET, FILM COATED ORAL at 18:07

## 2024-07-03 RX ADMIN — POTASSIUM CHLORIDE 10 MILLIEQUIVALENT(S): 600 TABLET, FILM COATED, EXTENDED RELEASE ORAL at 06:15

## 2024-07-03 RX ADMIN — Medication 1000 UNIT(S): at 12:40

## 2024-07-03 RX ADMIN — HYDROXYZINE PAMOATE 25 MILLIGRAM(S): 50 CAPSULE ORAL at 06:14

## 2024-07-03 RX ADMIN — APIXABAN 5 MILLIGRAM(S): 5 TABLET, FILM COATED ORAL at 17:58

## 2024-07-03 RX ADMIN — ASPIRIN 81 MILLIGRAM(S): 325 TABLET, FILM COATED ORAL at 12:40

## 2024-07-03 RX ADMIN — OXYCODONE HYDROCHLORIDE 10 MILLIGRAM(S): 100 SOLUTION ORAL at 22:33

## 2024-07-03 NOTE — PROGRESS NOTE ADULT - SUBJECTIVE AND OBJECTIVE BOX
Patient is a 53y old  Male who presents with a chief complaint of Acute respiratory failure with hypoxia (17 Jun 2024 10:29)  Patient seen in follow up for OBI.        PAST MEDICAL HISTORY:  No pertinent past medical history    Diabetes    No pertinent past medical history    Diabetes mellitus with no complication    Afib    Hypertension    BPH (benign prostatic hyperplasia)    Perforated gastric ulcer    Pulmonary embolism    History of non-ST elevation myocardial infarction (NSTEMI)    Osteomyelitis    CAD S/P percutaneous coronary angioplasty    Cerebrovascular accident      MEDICATIONS  (STANDING):  apixaban 5 milliGRAM(s) Oral two times a day  ascorbic acid 500 milliGRAM(s) Oral daily  aspirin enteric coated 81 milliGRAM(s) Oral daily  atorvastatin 40 milliGRAM(s) Oral at bedtime  benzocaine 20% Spray 1 Spray(s) Topical every 6 hours  calamine/zinc oxide Lotion 1 Application(s) Topical two times a day  chlorhexidine 2% Cloths 1 Application(s) Topical daily  cholecalciferol 1000 Unit(s) Oral daily  clobetasol 0.05% Cream 1 Application(s) Topical two times a day  dextrose 10% Bolus 125 milliLiter(s) IV Bolus once  dextrose 5%. 1000 milliLiter(s) (50 mL/Hr) IV Continuous <Continuous>  dextrose 5%. 1000 milliLiter(s) (100 mL/Hr) IV Continuous <Continuous>  dextrose 50% Injectable 25 Gram(s) IV Push once  dextrose 50% Injectable 12.5 Gram(s) IV Push once  diltiazem    milliGRAM(s) Oral daily  furosemide    Tablet 20 milliGRAM(s) Oral daily  gabapentin 100 milliGRAM(s) Oral every 12 hours  glucagon  Injectable 1 milliGRAM(s) IntraMuscular once  hydrOXYzine hydrochloride 25 milliGRAM(s) Oral two times a day  insulin lispro (ADMELOG) corrective regimen sliding scale   SubCutaneous three times a day before meals  insulin lispro (ADMELOG) corrective regimen sliding scale   SubCutaneous at bedtime  melatonin 5 milliGRAM(s) Oral at bedtime  metFORMIN 1000 milliGRAM(s) Oral two times a day  metoprolol succinate  milliGRAM(s) Oral two times a day  multivitamin 1 Tablet(s) Oral daily  naloxegol 25 milliGRAM(s) Oral daily  oxybutynin XL 10 milliGRAM(s) Oral daily  pantoprazole    Tablet 40 milliGRAM(s) Oral two times a day  polyethylene glycol 3350 17 Gram(s) Oral daily  potassium chloride    Tablet ER 10 milliEquivalent(s) Oral two times a day  predniSONE   Tablet 10 milliGRAM(s) Oral daily  senna 2 Tablet(s) Oral at bedtime  sucralfate 1 Gram(s) Oral two times a day  tiotropium 2.5 MICROgram(s)/olodaterol 2.5 MICROgram(s) (STIOLTO) Inhaler 2 Puff(s) Inhalation daily  vancomycin  IVPB 1250 milliGRAM(s) IV Intermittent every 24 hours    MEDICATIONS  (PRN):  acetaminophen     Tablet .. 650 milliGRAM(s) Oral every 6 hours PRN Mild Pain (1 - 3)  diphenhydrAMINE 25 milliGRAM(s) Oral every 4 hours PRN Rash and/or Itching  ondansetron Injectable 4 milliGRAM(s) IV Push every 6 hours PRN Nausea and/or Vomiting  oxyCODONE    IR 5 milliGRAM(s) Oral every 6 hours PRN Moderate Pain (4 - 6)  oxyCODONE    IR 10 milliGRAM(s) Oral every 6 hours PRN Severe Pain (7 - 10)  sodium chloride 0.65% Nasal 1 Spray(s) Both Nostrils three times a day PRN Nasal Congestion  sodium chloride 0.9% lock flush 10 milliLiter(s) IV Push every 1 hour PRN Pre/post blood products, medications, blood draw, and to maintain line patency    T(C): 36.9 (07-03-24 @ 04:57), Max: 36.9 (07-02-24 @ 19:45)  HR: 85 (07-03-24 @ 04:57) (83 - 127)  BP: 114/56 (07-03-24 @ 04:57) (97/61 - 114/56)  RR: 19 (07-03-24 @ 04:57)  SpO2: 91% (07-03-24 @ 04:57)  Wt(kg): --  I&O's Detail    02 Jul 2024 07:01  -  03 Jul 2024 07:00  --------------------------------------------------------  IN:  Total IN: 0 mL    OUT:    Indwelling Catheter - Urethral (mL): 1725 mL  Total OUT: 1725 mL    Total NET: -1725 mL                    PHYSICAL EXAM:  General: No distress  Respiratory: b/l air entry  Cardiovascular: S1 S2  Gastrointestinal: soft  Extremities:  Left TMA, Chronic skin changes        LABORATORY:                        12.4   15.31 )-----------( 385      ( 02 Jul 2024 10:58 )             38.2     07-02    136  |  102  |  15  ----------------------------<  208<H>  4.2   |  24  |  1.50<H>    Ca    8.7      02 Jul 2024 10:58  Mg     1.3     07-02    TPro  6.1  /  Alb  2.4<L>  /  TBili  0.4  /  DBili  x   /  AST  13<L>  /  ALT  19  /  AlkPhos  109  07-02    Sodium: 136 mmol/L (07-02 @ 10:58)    Potassium: 4.2 mmol/L (07-02 @ 10:58)    Hemoglobin: 12.4 g/dL (07-02 @ 10:58)    Creatinine, Serum 1.50 (07-02 @ 10:58)        LIVER FUNCTIONS - ( 02 Jul 2024 10:58 )  Alb: 2.4 g/dL / Pro: 6.1 g/dL / ALK PHOS: 109 U/L / ALT: 19 U/L / AST: 13 U/L / GGT: x           Urinalysis Basic - ( 02 Jul 2024 10:58 )    Color: x / Appearance: x / SG: x / pH: x  Gluc: 208 mg/dL / Ketone: x  / Bili: x / Urobili: x   Blood: x / Protein: x / Nitrite: x   Leuk Esterase: x / RBC: x / WBC x   Sq Epi: x / Non Sq Epi: x / Bacteria: x

## 2024-07-03 NOTE — PROGRESS NOTE ADULT - SUBJECTIVE AND OBJECTIVE BOX
PROGRESS NOTE   Patient is a 54y old  Male who presents with a chief complaint of Acute respiratory failure with hypoxia (03 Jul 2024 11:11)      HPI:  Known from rehab  Claude Villareal is a 52 YO M with past medical history of  AF on Eliquis, indwelling Aguilera, CAD s/p stents, CVA, DMT 2, Hypertension, osteomyelitis s/p debridement, PE, transferred from [facility] for difficulty in breathing.  His recent admission was for osteomyelitis and discharged on IV Vancomycin. On ED arrival he is unresponsive, apneic, no palpable pulse or blood pressure, EKG rhythm HR < 20/min W/O P waves. Patient intubated, Code ACLS activated, IV epinephrine, IV atropine administered, pulse became palpable, still Hypotensive, IV levophed administered. Admitted to ICU consulted,  Patient woke up and agitated try to pull out ETT,  IV propofol started,  Patient had no nausea or vomiting in [facility].   (13 Jun 2024 23:25)      Vital Signs Last 24 Hrs  T(C): 36.9 (03 Jul 2024 04:57), Max: 36.9 (02 Jul 2024 19:45)  T(F): 98.5 (03 Jul 2024 04:57), Max: 98.5 (02 Jul 2024 19:45)  HR: 85 (03 Jul 2024 04:57) (85 - 89)  BP: 114/56 (03 Jul 2024 04:57) (97/61 - 114/56)  BP(mean): --  RR: 19 (03 Jul 2024 04:57) (16 - 19)  SpO2: 91% (03 Jul 2024 04:57) (91% - 98%)    Parameters below as of 03 Jul 2024 04:57  Patient On (Oxygen Delivery Method): room air                              12.4   15.31 )-----------( 385      ( 02 Jul 2024 10:58 )             38.2               07-02    136  |  102  |  15  ----------------------------<  208<H>  4.2   |  24  |  1.50<H>    Ca    8.7      02 Jul 2024 10:58  Mg     1.3     07-02    TPro  6.1  /  Alb  2.4<L>  /  TBili  0.4  /  DBili  x   /  AST  13<L>  /  ALT  19  /  AlkPhos  109  07-02      PHYSICAL EXAM  Vascular: DP & PT faintly palpable to the right foot, Capillary refill 4 seconds  Neurological: Light touch sensation not intact bilaterally  Musculoskeletal: left foot s/p midfoot amputation, healed.  Dermatological: Right posterior heel ulceration down to skin, subcutaneous tissue, fat, bone, Fibro-necrotic base, + malodor, + drainage, - purulence

## 2024-07-03 NOTE — PROGRESS NOTE ADULT - ASSESSMENT
52 YO M with  AF on Eliquis, indwelling Aguilera, CAD s/p stents, CVA, DMT 2, Hypertension, osteomyelitis s/p debridement, PE, transferred from [facility] for difficulty in breathing.  His recent admission was for osteomyelitis and discharged on IV Vancomycin. Reported at [facility] he was given a dose of ertapenem and developed difficulty breathing followed by apnea. On ED arrival he was unresponsive, apneic, no palpable pulse or blood pressure, EKG rhythm HR < 20/min W/O P waves. Patient intubated,     RECOMMENDATIONS  1-Asystolic cardiac arrest, Acute hypoxic respiratory failure, Shock  -rec avoiding carbapenems and fine with just Vanco    2-Osteomyeliits right calcaneous  6/4 S/p Selective debridement of wound 04-Jun-2024 10:52:57  Parviz Todd  6/4  TCx MRSA, Escherichia coli ESBL  Right calcaneus bone pathology:  -   Fragments of bone with chronic osteomyelitis.  -   Focal acute osteomyelitis cannot be ruled out.  6/30 adjusted with pharmacy involvement dosing to vancomycin 1250 mg IV q24, dosing per pharmacy protocol and plan had been with inability to rule out osteo   rec 6 weeks so last day 7/18  PICC placed 6/7    3-Rash still has not seen dermatology so we have no diagnosis of chronic issue but has improved from recent acute blistering    From an ID standpoint no further requirement for inpatient status for the management of ID issues. Fine with discharge from ID standpoint when other medical issues no longer require inpatient care and social issues allow for a safe discharge plan. To schedule an outpatient ID follow up appointment please call our office at [phone]    Thank you for consulting us and involving us in the management of this most interesting and challenging case.  We will follow along in the care of this patient. Please call us at [phone] or text me directly on my cell# at [phone] with any concerns.

## 2024-07-03 NOTE — SOCIAL WORK PROGRESS NOTE - NSSWPROGRESSNOTE_GEN_ALL_CORE
KAMLA spoke with MD- pt is going to the OR today for debridement, not ready for DC today. SW to follow.

## 2024-07-03 NOTE — PROGRESS NOTE ADULT - ASSESSMENT
Claude Villareal is a 54 YO M with past medical history of  AF on Eliquis, indwelling Aguilera, CAD s/p stents, CVA, DMT 2, Hypertension, osteomyelitis s/p debridement, PE, transferred from [facility] for difficulty in breathing.  His recent admission was for osteomyelitis and discharged on IV Vancomycin. On ED arrival he is unresponsive, apneic, no palpable pulse or blood pressure, EKG rhythm HR < 20/min W/O P waves. Patient intubated, Code ACLS activated, IV epinephrine, IV atropine administered, pulse became palpable, still Hypotensive, IV levophed administered. Admitted to ICU consulted,  Patient woke up and agitated try to pull out ETT,  IV propofol started,

## 2024-07-03 NOTE — PROGRESS NOTE ADULT - ASSESSMENT
OBI: ATN  Hypokalemia  Metabolic acidosis: OBI, Lactic acidosis  Diabetes  Atrial fibrillation  s/p Cardiac arrest    Mild increase in creatinine trend. Will follow trend. Monitor vanco levels closely. To continue current meds. Monitor BP trend. Titrate BP meds as needed.    Monitor blood sugar levels. Insulin coverage as needed. Avoid nephrotoxic meds as possible. Will follow electrolytes and renal function trend.

## 2024-07-03 NOTE — PROGRESS NOTE ADULT - SUBJECTIVE AND OBJECTIVE BOX
[facility] -- Brittany Lutz, Reynaldo Sweeney, Román, Francesco Boss: Office # [phone]    Follow Up:  s/p Cardiac Arrest, Afib with SVR, Diastolic HF    Subjective/Observations: Patient awake, alert, resting in bed. Denies chest pain, palpitations and dizziness. Denies any difficulty breathing. No orthopnea and PND. Tolerating room air. Right foot DSD intact.     REVIEW OF SYSTEMS: All other review of systems are negative unless indicated above    PAST MEDICAL & SURGICAL HISTORY:  Diabetes      Diabetes mellitus with no complication      Afib      Hypertension      Hypertension      BPH (benign prostatic hyperplasia)      Perforated gastric ulcer  s/p emergent ex-lap omentopexy and plication 6/2019      Pulmonary embolism      History of non-ST elevation myocardial infarction (NSTEMI)      Osteomyelitis  s/p debridement      CAD S/P percutaneous coronary angioplasty      Cerebrovascular accident      H/O abdominal surgery      Perforated gastric ulcer      Traumatic amputation of left foot, initial encounter    MEDICATIONS  (STANDING):  apixaban 5 milliGRAM(s) Oral two times a day  ascorbic acid 500 milliGRAM(s) Oral daily  aspirin enteric coated 81 milliGRAM(s) Oral daily  atorvastatin 40 milliGRAM(s) Oral at bedtime  benzocaine 20% Spray 1 Spray(s) Topical every 6 hours  calamine/zinc oxide Lotion 1 Application(s) Topical two times a day  chlorhexidine 2% Cloths 1 Application(s) Topical daily  cholecalciferol 1000 Unit(s) Oral daily  clobetasol 0.05% Cream 1 Application(s) Topical two times a day  dextrose 10% Bolus 125 milliLiter(s) IV Bolus once  dextrose 5%. 1000 milliLiter(s) (50 mL/Hr) IV Continuous <Continuous>  dextrose 5%. 1000 milliLiter(s) (100 mL/Hr) IV Continuous <Continuous>  dextrose 50% Injectable 25 Gram(s) IV Push once  dextrose 50% Injectable 12.5 Gram(s) IV Push once  diltiazem    milliGRAM(s) Oral daily  furosemide    Tablet 20 milliGRAM(s) Oral daily  gabapentin 100 milliGRAM(s) Oral every 12 hours  glucagon  Injectable 1 milliGRAM(s) IntraMuscular once  hydrOXYzine hydrochloride 25 milliGRAM(s) Oral two times a day  insulin lispro (ADMELOG) corrective regimen sliding scale   SubCutaneous at bedtime  insulin lispro (ADMELOG) corrective regimen sliding scale   SubCutaneous three times a day before meals  melatonin 5 milliGRAM(s) Oral at bedtime  metFORMIN 1000 milliGRAM(s) Oral two times a day  metoprolol succinate  milliGRAM(s) Oral two times a day  multivitamin 1 Tablet(s) Oral daily  naloxegol 25 milliGRAM(s) Oral daily  oxybutynin XL 10 milliGRAM(s) Oral daily  pantoprazole    Tablet 40 milliGRAM(s) Oral two times a day  polyethylene glycol 3350 17 Gram(s) Oral daily  potassium chloride    Tablet ER 10 milliEquivalent(s) Oral two times a day  predniSONE   Tablet 10 milliGRAM(s) Oral daily  senna 2 Tablet(s) Oral at bedtime  sucralfate 1 Gram(s) Oral two times a day  tiotropium 2.5 MICROgram(s)/olodaterol 2.5 MICROgram(s) (STIOLTO) Inhaler 2 Puff(s) Inhalation daily  vancomycin  IVPB 1250 milliGRAM(s) IV Intermittent every 24 hours    MEDICATIONS  (PRN):  acetaminophen     Tablet .. 650 milliGRAM(s) Oral every 6 hours PRN Mild Pain (1 - 3)  diphenhydrAMINE 25 milliGRAM(s) Oral every 4 hours PRN Rash and/or Itching  ondansetron Injectable 4 milliGRAM(s) IV Push every 6 hours PRN Nausea and/or Vomiting  oxyCODONE    IR 5 milliGRAM(s) Oral every 6 hours PRN Moderate Pain (4 - 6)  oxyCODONE    IR 10 milliGRAM(s) Oral every 6 hours PRN Severe Pain (7 - 10)  sodium chloride 0.65% Nasal 1 Spray(s) Both Nostrils three times a day PRN Nasal Congestion  sodium chloride 0.9% lock flush 10 milliLiter(s) IV Push every 1 hour PRN Pre/post blood products, medications, blood draw, and to maintain line patency    Allergies  ertapenem (Blisters; Rash)  fish (Hives)    Vital Signs Last 24 Hrs  T(C): 36.9 (03 Jul 2024 04:57), Max: 36.9 (02 Jul 2024 19:45)  T(F): 98.5 (03 Jul 2024 04:57), Max: 98.5 (02 Jul 2024 19:45)  HR: 85 (03 Jul 2024 04:57) (85 - 89)  BP: 114/56 (03 Jul 2024 04:57) (97/61 - 114/56)  BP(mean): --  RR: 19 (03 Jul 2024 04:57) (16 - 19)  SpO2: 91% (03 Jul 2024 04:57) (91% - 98%)    Parameters below as of 03 Jul 2024 04:57  Patient On (Oxygen Delivery Method): room air      I&O's Summary    02 Jul 2024 07:01  -  03 Jul 2024 07:00  --------------------------------------------------------  IN: 0 mL / OUT: 1725 mL / NET: -1725 mL          TELE: Not on telemetry   PHYSICAL EXAM:  Constitutional: NAD, awake and alert  HEENT: Moist Mucous Membranes, Anicteric  Pulmonary: Non-labored, breath sounds are clear bilaterally, Diminished at bases No wheezing, rales or rhonchi  Cardiovascular: Regular, S1 and S2, No murmurs, No rubs, gallops or clicks  Gastrointestinal:  soft, nontender, nondistended   Lymph: No peripheral edema. No lymphadenopathy.   Skin: No visible rashes  Right foot DSD intact.   Psych:  Mood & affect appropriate      LABS: All Labs Reviewed:                        12.4   15.31 )-----------( 385      ( 02 Jul 2024 10:58 )             38.2     02 Jul 2024 10:58    136    |  102    |  15     ----------------------------<  208    4.2     |  24     |  1.50     Ca    8.7        02 Jul 2024 10:58  Mg     1.3       02 Jul 2024 10:58    TPro  6.1    /  Alb  2.4    /  TBili  0.4    /  DBili  x      /  AST  13     /  ALT  19     /  AlkPhos  109    02 Jul 2024 10:58   LIVER FUNCTIONS - ( 02 Jul 2024 10:58 )  Alb: 2.4 g/dL / Pro: 6.1 g/dL / ALK PHOS: 109 U/L / ALT: 19 U/L / AST: 13 U/L / GGT: x           Triiodothyronine, Total (T3 Total): 120 ng/dL (06-14-24 @ 01:25)    12 Lead ECG:   Ventricular Rate 126 BPM    QRS Duration 76 ms    Q-T Interval 302 ms    QTC Calculation(Bazett) 437 ms    R Axis -12 degrees    T Axis 103 degrees    Diagnosis Line Atrial fibrillation with rapid ventricular response  Low voltage QRS  Nonspecific ST and T wave abnormality  Abnormal ECG  When compared with ECG of 14-JUN-2024 11:03,  Questionable change in QRS axis  Nonspecific T wave abnormality now evident in Inferior leads  Nonspecific T wave abnormality, worse in Anterolateral leads  Confirmed by Juventino Soto MD ([id number]) on 6/20/2024 12:52:09 PM (06-20-24 @ 11:37)      TRANSTHORACIC ECHOCARDIOGRAM REPORT  ________________________________________________________________________________                                      _______       Pt. Name:       SARAH MORRISON Study Date:    6/14/2024  MRN:            [id number]         YOB: 1970  Accession #:    [id number]        Age:           53 years  Account#:       [id number]       Gender:        M  Visit ID#  Heart Rate:                      Height:        72.05 in (183.00 cm)  Rhythm:          Weight:        222.66 lb (101.00 kg)  Blood Pressure: 88/53 mmHg       BSA/BMI:       2.23 m² / 30.16 kg/m²  ________________________________________________________________________________________  Referring Physician:    [id number] Hill Brizuela  Interpreting Physician: Rahel Boss MD  Primary Sonographer:    Mounika FELDMAN    CPT:               ECHO TTE WO CON COMP W DOPP - 39319.m  Indication(s):     Heart failure, unspecified - I50.9  Procedure:         Transthoracic echocardiogram with 2-D, M-mode and complete                     spectral and color flow Doppler.  Ordering Location: ICU1  Admission Status:  Inpatient    _______________________________________________________________________________________     CONCLUSIONS:      1. Left ventricular cavity is normal in size. Left ventricular systolic function is normal with an ejection fraction visually estimated at 55 to 60 %. There are no regional wall motion abnormalities seen.   2. Moderate to severe left ventricular hypertrophy.   3. Normal right ventricular cavity size, with normal wall thickness, and normal systolic function.   4. The left atrium is severely dilated.   5. The right atrium is severely dilated.   6. Trileaflet aortic valve with normal systolic excursion. There is focal calcification of the aortic valve leaflets.   7. Mild to moderate mitral regurgitation.   8. There is moderate calcification of the mitral valve annulus.   9. Structurally normal tricuspid valve with normal leaflet excursion.Mild tricuspid regurgitation.  10. The inferior vena cava is normal in size measuring 1.70 cm in diameter, (normal <2.1cm) with normal inspiratory collapse (normal >50%) consistent with normal right atrial pressure (~3, range 0-5mmHg).  11. Estimatedpulmonary artery systolic pressure is 43 mmHg.    ________________________________________________________________________________________  FINDINGS:     Left Ventricle:  The left ventricular cavity is normal in size. Left ventricular systolic function is normal with an ejection fraction visually estimated at 55 to 60%. There are no regional wall motion abnormalities seen. There is normal left ventricular diastolic function, with normal filling pressure. Moderate to severe left ventricular hypertrophy.     Right Ventricle:  The right ventricular cavity is normal in size, with normal wall thickness and normal systolic function.     Left Atrium:  The left atrium is severely dilated.     Right Atrium:  The right atrium is severely dilated.     Aortic Valve:  The aortic valve appears trileaflet with normal systolic excursion. There is focal calcification of the aortic valve leaflets. There is no aortic valve stenosis. There is no evidence of aortic regurgitation.     Mitral Valve:  There is moderate calcification of the mitral valve annulus. There is mild to moderate mitral regurgitation.     Tricuspid Valve:  Structurally normal tricuspid valve with normal leaflet excursion. There is mild tricuspid regurgitation. Estimated pulmonary artery systolic pressure is 43 mmHg.     Pulmonic Valve:  The pulmonic valve was not well visualized.     Systemic Veins:  The inferior vena cava is normal in size measuring 1.70 cm in diameter, (normal <2.1cm) with normal inspiratory collapse (normal >50%) consistent with normal right atrial pressure (~3, range 0-5mmHg).  ____________________________________________________________________  QUANTITATIVE DATA:  Left Ventricle Measurements: (Indexed to BSA)     IVSd (2D):   1.7 cm  LVPWd (2D):  1.4 cm  LVIDd (2D):  4.3 cm  LVIDs (2D):  3.0 cm  LV Mass:     281 g  125.9 g/m²  Visualized LV EF%: 55 to 60%     MV E Vmax:    1.07 m/s  MV A Vmax:    0.00 m/s  e' lateral:   9.68 cm/s  e' medial:    10.90 cm/s  E/e' lateral: 11.05  E/e' medial:  9.82  E/e' Average: 10.40  MV DT:        180 msec    Aorta Measurements: (Normal range) (Indexed to BSA)     Sinuses of Valsalva: 3.40 cm (3.1 - 3.7 cm)       Left Atrium Measurements: (Indexed to BSA)  LA Diam 2D: 4.00 cm       LVOT / RVOT/ Qp/Qs Data: (Indexed to BSA)  LVOT Diameter: 2.10 cm  LVOT Area:     3.46 cm²    Mitral Valve Measurements:     MV E Vmax: 1.1 m/s  MV A Vmax: 0.0 m/s       Tricuspid Valve Measurements:     TR Vmax:          3.2 m/s  TR Peak Gradient: 40.4 mmHg  RA Pressure:      3 mmHg  PASP:             43 mmHg    ________________________________________________________________________________________  Electronically signed on 6/14/2024 at 12:08:59 PM by Rahel Boss MD         *** Final ***

## 2024-07-03 NOTE — PROGRESS NOTE ADULT - SUBJECTIVE AND OBJECTIVE BOX
Patient is a 54y old  Male who presents with a chief complaint of Acute respiratory failure with hypoxia (03 Jul 2024 11:27)  plan for OR debridement today    INTERVAL HPI/OVERNIGHT EVENTS:  T(C): 36.9 (07-03-24 @ 04:57), Max: 36.9 (07-02-24 @ 19:45)  HR: 85 (07-03-24 @ 04:57) (85 - 89)  BP: 114/56 (07-03-24 @ 04:57) (104/76 - 114/56)  RR: 19 (07-03-24 @ 04:57) (18 - 19)  SpO2: 91% (07-03-24 @ 04:57) (91% - 97%)  Wt(kg): --  I&O's Summary    02 Jul 2024 07:01  -  03 Jul 2024 07:00  --------------------------------------------------------  IN: 0 mL / OUT: 1725 mL / NET: -1725 mL        LABS:                        12.4   15.31 )-----------( 385      ( 02 Jul 2024 10:58 )             38.2     07-02    136  |  102  |  15  ----------------------------<  208<H>  4.2   |  24  |  1.50<H>    Ca    8.7      02 Jul 2024 10:58  Mg     1.3     07-02    TPro  6.1  /  Alb  2.4<L>  /  TBili  0.4  /  DBili  x   /  AST  13<L>  /  ALT  19  /  AlkPhos  109  07-02      Urinalysis Basic - ( 02 Jul 2024 10:58 )    Color: x / Appearance: x / SG: x / pH: x  Gluc: 208 mg/dL / Ketone: x  / Bili: x / Urobili: x   Blood: x / Protein: x / Nitrite: x   Leuk Esterase: x / RBC: x / WBC x   Sq Epi: x / Non Sq Epi: x / Bacteria: x      CAPILLARY BLOOD GLUCOSE      POCT Blood Glucose.: 228 mg/dL (03 Jul 2024 12:31)  POCT Blood Glucose.: 142 mg/dL (03 Jul 2024 08:16)  POCT Blood Glucose.: 195 mg/dL (02 Jul 2024 21:33)  POCT Blood Glucose.: 150 mg/dL (02 Jul 2024 17:26)        Urinalysis Basic - ( 02 Jul 2024 10:58 )    Color: x / Appearance: x / SG: x / pH: x  Gluc: 208 mg/dL / Ketone: x  / Bili: x / Urobili: x   Blood: x / Protein: x / Nitrite: x   Leuk Esterase: x / RBC: x / WBC x   Sq Epi: x / Non Sq Epi: x / Bacteria: x        MEDICATIONS  (STANDING):  apixaban 5 milliGRAM(s) Oral two times a day  ascorbic acid 500 milliGRAM(s) Oral daily  aspirin enteric coated 81 milliGRAM(s) Oral daily  atorvastatin 40 milliGRAM(s) Oral at bedtime  benzocaine 20% Spray 1 Spray(s) Topical every 6 hours  calamine/zinc oxide Lotion 1 Application(s) Topical two times a day  chlorhexidine 2% Cloths 1 Application(s) Topical daily  cholecalciferol 1000 Unit(s) Oral daily  clobetasol 0.05% Cream 1 Application(s) Topical two times a day  dextrose 10% Bolus 125 milliLiter(s) IV Bolus once  dextrose 5%. 1000 milliLiter(s) (50 mL/Hr) IV Continuous <Continuous>  dextrose 5%. 1000 milliLiter(s) (100 mL/Hr) IV Continuous <Continuous>  dextrose 50% Injectable 25 Gram(s) IV Push once  dextrose 50% Injectable 12.5 Gram(s) IV Push once  diltiazem    milliGRAM(s) Oral daily  furosemide    Tablet 20 milliGRAM(s) Oral daily  gabapentin 100 milliGRAM(s) Oral every 12 hours  glucagon  Injectable 1 milliGRAM(s) IntraMuscular once  hydrOXYzine hydrochloride 25 milliGRAM(s) Oral two times a day  insulin lispro (ADMELOG) corrective regimen sliding scale   SubCutaneous three times a day before meals  insulin lispro (ADMELOG) corrective regimen sliding scale   SubCutaneous at bedtime  melatonin 5 milliGRAM(s) Oral at bedtime  metFORMIN 1000 milliGRAM(s) Oral two times a day  metoprolol succinate  milliGRAM(s) Oral two times a day  multivitamin 1 Tablet(s) Oral daily  naloxegol 25 milliGRAM(s) Oral daily  oxybutynin XL 10 milliGRAM(s) Oral daily  pantoprazole    Tablet 40 milliGRAM(s) Oral two times a day  polyethylene glycol 3350 17 Gram(s) Oral daily  potassium chloride    Tablet ER 10 milliEquivalent(s) Oral two times a day  predniSONE   Tablet 10 milliGRAM(s) Oral daily  senna 2 Tablet(s) Oral at bedtime  sucralfate 1 Gram(s) Oral two times a day  tiotropium 2.5 MICROgram(s)/olodaterol 2.5 MICROgram(s) (STIOLTO) Inhaler 2 Puff(s) Inhalation daily  vancomycin  IVPB 1250 milliGRAM(s) IV Intermittent every 24 hours    MEDICATIONS  (PRN):  acetaminophen     Tablet .. 650 milliGRAM(s) Oral every 6 hours PRN Mild Pain (1 - 3)  diphenhydrAMINE 25 milliGRAM(s) Oral every 4 hours PRN Rash and/or Itching  ondansetron Injectable 4 milliGRAM(s) IV Push every 6 hours PRN Nausea and/or Vomiting  oxyCODONE    IR 10 milliGRAM(s) Oral every 6 hours PRN Severe Pain (7 - 10)  oxyCODONE    IR 5 milliGRAM(s) Oral every 6 hours PRN Moderate Pain (4 - 6)  sodium chloride 0.65% Nasal 1 Spray(s) Both Nostrils three times a day PRN Nasal Congestion  sodium chloride 0.9% lock flush 10 milliLiter(s) IV Push every 1 hour PRN Pre/post blood products, medications, blood draw, and to maintain line patency      REVIEW OF SYSTEMS:  CONSTITUTIONAL: No fever, weight loss, or fatigue  EYES: No eye pain, visual disturbances, or discharge  ENMT:  No difficulty hearing, tinnitus, vertigo; No sinus or throat pain  NECK: No pain or stiffness  RESPIRATORY: No cough, wheezing, chills or hemoptysis; No shortness of breath  CARDIOVASCULAR: No chest pain, palpitations, dizziness, or leg swelling  GASTROINTESTINAL: No abdominal or epigastric pain. No nausea, vomiting, or hematemesis; No diarrhea or constipation. No melena or hematochezia.  GENITOURINARY: No dysuria, frequency, hematuria, or incontinence  NEUROLOGICAL: No headaches, memory loss, loss of strength, numbness, or tremors  SKIN: No itching, burning, rashes, or lesions   LYMPH NODES: No enlarged glands  ENDOCRINE: No heat or cold intolerance; No hair loss  MUSCULOSKELETAL: No joint pain or swelling; No muscle, back, or extremity pain  PSYCHIATRIC: No depression, anxiety, mood swings, or difficulty sleeping  HEME/LYMPH: No easy bruising, or bleeding gums  ALLERY AND IMMUNOLOGIC: No hives or eczema    RADIOLOGY & ADDITIONAL TESTS:    Imaging Personally Reviewed:  [x ] YES  [ ] NO    Consultant(s) Notes Reviewed:  [ x] YES  [ ] NO    PHYSICAL EXAM:  GENERAL: NAD, well-groomed, well-developed  HEAD:  Atraumatic, Normocephalic  EYES: EOMI, PERRLA, conjunctiva and sclera clear  ENMT: No tonsillar erythema, exudates, or enlargement; Moist mucous membranes, Good dentition, No lesions  NECK: Supple, No JVD, Normal thyroid  NERVOUS SYSTEM:  Alert & Oriented X3, Good concentration; Motor Strength 5/5 B/L upper and lower extremities; DTRs 2+ intact and symmetric  CHEST/LUNG: Clear to percussion bilaterally; No rales, rhonchi, wheezing, or rubs  HEART: Regular rate and rhythm; No murmurs, rubs, or gallops  ABDOMEN: Soft, Nontender, Nondistended; Bowel sounds present  EXTREMITIES:  2+ Peripheral Pulses, No clubbing, cyanosis, or edema  LYMPH: No lymphadenopathy noted  SKIN: No rashes or lesions    Care Discussed with Consultants/Other Providers [x ] YES  [ ] NO

## 2024-07-03 NOTE — CHART NOTE - NSCHARTNOTEFT_GEN_A_CORE
Patient to be scheduled as an add-on for surgery on Friday 7/5 6pm for right heel debridement with application of antibiotics beads with possible partial calcanectomy

## 2024-07-03 NOTE — PROGRESS NOTE ADULT - ASSESSMENT
52 YO M with past medical history of AFib (on Eliquis), indwelling Aguilera, cerebral aneurysm, CAD (s/p stents), CVA, T2DM, HTN, OM (s/p debridement), PE, perforated gastric ulcer s/p omentopexy 2019 with Dr. Mejia, transferred from [facility] for difficulty in breathing s/p cardiac arrest    CAD, Afib, s/p Cardiac Arrest, Mod-Severe MR, HTN  - s/p cardiac arrest x2 w/ AHRF.  s/p intubation with successful extubation.   - Still tolerating RA with no evidence of volume overload    - Trops + but no sig trend. No clear evidence of acute ischemia  - Continue ASA and statin      - Known with chronic AFib   - Rate controlled per flow sheets  - Continue long-acting Cardizem and Metoprolol with more lenient parameter  - BP stable and controlled   - Continue Eliquis    - TTE normal EF and mod-severe MR  - No sign of volume overload on exam  - Continue  home Lasix 20mg PO daily  - Creatinine:  <--1.50,  <--1.20,  <--1.30    - ATN with creat normalized  - With diffuse rashes +pruritus.  ID following    - Monitor and replete lytes, keep K>4, Mg>2.  - Will continue to follow.    Danny Arce, MS FNP, AGACNP  Nurse Practitioner- Cardiology   Please call on TEAMS   52 YO M with past medical history of AFib (on Eliquis), indwelling Aguilera, cerebral aneurysm, CAD (s/p stents), CVA, T2DM, HTN, OM (s/p debridement), PE, perforated gastric ulcer s/p omentopexy 2019 with Dr. Mejia, transferred from [facility] for difficulty in breathing s/p cardiac arrest    CAD, Afib, s/p Cardiac Arrest, Mod-Severe MR, HTN  - s/p cardiac arrest x2 w/ AHRF.  s/p intubation with successful extubation.   - Still tolerating RA with no evidence of volume overload    - Trops + but no sig trend. No clear evidence of acute ischemia  - Continue ASA and statin      - Known with chronic AFib   - Rate controlled per flow sheets  - Continue long-acting Cardizem and Metoprolol with more lenient parameter  - BP stable and controlled   - Continue Eliquis    - TTE normal EF and mod-severe MR  - No sign of volume overload on exam  - Continue  home Lasix 20mg PO daily  - Creatinine:  <--1.50,  <--1.20,  <--1.30    - ATN with creat normalized  - With diffuse rashes +pruritus.  ID following    - Plan for debridement of Rt heel wound 7/3  - Pt has no active ischemia, decompensated heart failure, unstable arrythmia, or severe stenotic valvular disease. Patient is optimized from cardiovascular standpoint to proceed with planned procedure with routine hemodynamic monitoring.     - Monitor and replete lytes, keep K>4, Mg>2.  - Will continue to follow.    Danny Arce, MS FNP, AGACNP  Nurse Practitioner- Cardiology   Please call on TEAMS

## 2024-07-03 NOTE — PHARMACOTHERAPY INTERVENTION NOTE - COMMENTS
Vancomycin dosing by pharmacy:  1. Indication for the vancomycin: R heel wound/osteomyelitis  2. Requesting Provider: Dr. Whitten  3. Current plan and dosing regimen: 1250 mg Q24H  4. Day of therapy: 29  5. Cultures: Tissue Cx 5/26/24: E faecalis + MRSA. Tissue Cx 6/4/24: ESBL E coli + E faecalis. R heel surgical swab 6/14/24: MRSA + citrobacter freundii + pseudomonas stutzeri.  6. Target trough or AUC/BAYLEE: 400-600 hr*ug/mL  7. Day and time level is due: 7/5 05:00  8. Previous levels: 6/4 (19:31): 22, 6/5 (05:47): 14, 6/6 (20:20): 14, 6/9 (04:58): 14.4, 6/11 (04:30): 15.7, 6/13 (05:54): 17.3, 6/14 (01:25): 15.2, 6/14 (04:30): 16.1, 6/15 (5:40): 17.5, 6/16 (5:38): 15.3, 6/17 (05:52): 13.2, 6/18 (05:50): 11.8, 6/19 (06:15): 21.3, 6/20 (08:52): 21.6, 6/21 (09:30): 37.5, 06/22 (0945): 11.1, 06/23 (09:30): 15.9, 06/24 (08:00): 19.6, 06/27 (21:47): 18.2, 6/29 (10:05): 36.4 (taken during infusion), 6/30 (07:10): 20.3 7/3 0620: 14.2  9. Expected 24-hour AUC: 507 hr*ug/mL

## 2024-07-03 NOTE — PROGRESS NOTE ADULT - SUBJECTIVE AND OBJECTIVE BOX
[facility] DIVISION of INFECTIOUS DISEASE  Juventino Whitten MD PhD, Symone Hickey MD, Anne-Marie Li MD, Jeffery Jacobs MD, Ryan Romeo MD  and providing coverage with Melody Armstrong MD  Providing Infectious Disease Consultations at [facility], [facility], [facility]'s    Office# [phone] to schedule follow up appointments  Answering Service for urgent calls or New Consults [phone]  Cell# to text for urgent issues Juventino Whitten [phone]     infectious diseases progress note:    SARAH MORRISON is a 54y y. o. Male patient    Overnight and events of the last 24hrs reviewed    Allergies    ertapenem (Blisters; Rash)  fish (Hives)    Intolerances        ANTIBIOTICS/RELEVANT:  antimicrobials  vancomycin  IVPB 1250 milliGRAM(s) IV Intermittent every 24 hours    immunologic:    OTHER:  acetaminophen     Tablet .. 650 milliGRAM(s) Oral every 6 hours PRN  apixaban 5 milliGRAM(s) Oral two times a day  ascorbic acid 500 milliGRAM(s) Oral daily  aspirin enteric coated 81 milliGRAM(s) Oral daily  atorvastatin 40 milliGRAM(s) Oral at bedtime  benzocaine 20% Spray 1 Spray(s) Topical every 6 hours  calamine/zinc oxide Lotion 1 Application(s) Topical two times a day  chlorhexidine 2% Cloths 1 Application(s) Topical daily  cholecalciferol 1000 Unit(s) Oral daily  clobetasol 0.05% Cream 1 Application(s) Topical two times a day  dextrose 10% Bolus 125 milliLiter(s) IV Bolus once  dextrose 5%. 1000 milliLiter(s) IV Continuous <Continuous>  dextrose 5%. 1000 milliLiter(s) IV Continuous <Continuous>  dextrose 50% Injectable 25 Gram(s) IV Push once  dextrose 50% Injectable 12.5 Gram(s) IV Push once  diltiazem    milliGRAM(s) Oral daily  diphenhydrAMINE 25 milliGRAM(s) Oral every 4 hours PRN  furosemide    Tablet 20 milliGRAM(s) Oral daily  gabapentin 100 milliGRAM(s) Oral every 12 hours  glucagon  Injectable 1 milliGRAM(s) IntraMuscular once  hydrOXYzine hydrochloride 25 milliGRAM(s) Oral two times a day  insulin lispro (ADMELOG) corrective regimen sliding scale   SubCutaneous three times a day before meals  insulin lispro (ADMELOG) corrective regimen sliding scale   SubCutaneous at bedtime  melatonin 5 milliGRAM(s) Oral at bedtime  metFORMIN 1000 milliGRAM(s) Oral two times a day  metoprolol succinate  milliGRAM(s) Oral two times a day  multivitamin 1 Tablet(s) Oral daily  naloxegol 25 milliGRAM(s) Oral daily  ondansetron Injectable 4 milliGRAM(s) IV Push every 6 hours PRN  oxybutynin XL 10 milliGRAM(s) Oral daily  oxyCODONE    IR 5 milliGRAM(s) Oral every 6 hours PRN  oxyCODONE    IR 10 milliGRAM(s) Oral every 6 hours PRN  pantoprazole    Tablet 40 milliGRAM(s) Oral two times a day  polyethylene glycol 3350 17 Gram(s) Oral daily  potassium chloride    Tablet ER 10 milliEquivalent(s) Oral two times a day  predniSONE   Tablet 10 milliGRAM(s) Oral daily  senna 2 Tablet(s) Oral at bedtime  sodium chloride 0.65% Nasal 1 Spray(s) Both Nostrils three times a day PRN  sodium chloride 0.9% lock flush 10 milliLiter(s) IV Push every 1 hour PRN  sucralfate 1 Gram(s) Oral two times a day  tiotropium 2.5 MICROgram(s)/olodaterol 2.5 MICROgram(s) (STIOLTO) Inhaler 2 Puff(s) Inhalation daily      Objective:  Vital Signs Last 24 Hrs  T(C): 36.9 (03 Jul 2024 04:57), Max: 36.9 (02 Jul 2024 19:45)  T(F): 98.5 (03 Jul 2024 04:57), Max: 98.5 (02 Jul 2024 19:45)  HR: 85 (03 Jul 2024 04:57) (85 - 89)  BP: 114/56 (03 Jul 2024 04:57) (97/61 - 114/56)  BP(mean): --  RR: 19 (03 Jul 2024 04:57) (16 - 19)  SpO2: 91% (03 Jul 2024 04:57) (91% - 98%)    Parameters below as of 03 Jul 2024 04:57  Patient On (Oxygen Delivery Method): room air        T(C): 36.9 (07-03-24 @ 04:57), Max: 36.9 (07-02-24 @ 19:45)  T(C): 36.9 (07-03-24 @ 04:57), Max: 37.1 (06-30-24 @ 21:00)  T(C): 36.9 (07-03-24 @ 04:57), Max: 37.1 (06-30-24 @ 21:00)    PHYSICAL EXAM:  HEENT: NC atraumatic  Neck: supple  Respiratory: no accessory muscle use, breathing comfortably  Cardiovascular: distant  Gastrointestinal: normal appearing, nondistended  Extremities: no clubbing, no cyanosis,        LABS:                          12.4   15.31 )-----------( 385      ( 02 Jul 2024 10:58 )             38.2       WBC  15.31 07-02 @ 10:58  11.90 06-30 @ 07:10  10.71 06-28 @ 09:45  10.40 06-27 @ 06:22  11.48 06-26 @ 18:10      07-02    136  |  102  |  15  ----------------------------<  208<H>  4.2   |  24  |  1.50<H>    Ca    8.7      02 Jul 2024 10:58  Mg     1.3     07-02    TPro  6.1  /  Alb  2.4<L>  /  TBili  0.4  /  DBili  x   /  AST  13<L>  /  ALT  19  /  AlkPhos  109  07-02      Creatinine: 1.50 mg/dL (07-02-24 @ 10:58)  Creatinine: 1.20 mg/dL (06-30-24 @ 07:10)  Creatinine: 1.30 mg/dL (06-28-24 @ 09:45)  Creatinine: 1.10 mg/dL (06-27-24 @ 06:22)  Creatinine: 1.40 mg/dL (06-26-24 @ 18:10)        Urinalysis Basic - ( 02 Jul 2024 10:58 )    Color: x / Appearance: x / SG: x / pH: x  Gluc: 208 mg/dL / Ketone: x  / Bili: x / Urobili: x   Blood: x / Protein: x / Nitrite: x   Leuk Esterase: x / RBC: x / WBC x   Sq Epi: x / Non Sq Epi: x / Bacteria: x            INFLAMMATORY MARKERS      MICROBIOLOGY:              RADIOLOGY & ADDITIONAL STUDIES:

## 2024-07-03 NOTE — PROGRESS NOTE ADULT - PROBLEM SELECTOR PLAN 1
Patient examined and evluated at this time.   Dressings changed to right lower extremity  WBC 15.31  Discussed condition with patient, recommend at this time wound debridement and cleaning  NPO now for procedure later today Patient examined and evluated at this time.   Dressings changed to right lower extremity  WBC 15.31  Discussed condition with patient, recommend at this time wound debridement and cleaning.  Pt is in agreement with treatment plan, will plan to take to OR today 7/3  NPO now for procedure later today  Xray right foot ordered  NWB to right lower extremity  Will discuss with primary team  Request medical optimization and cardiac clearance prior to surgery Patient examined and evaluated at this time.   Dressings changed to right lower extremity  WBC 15.31  Discussed condition with patient, recommend at this time wound debridement and cleaning.  Pt is in agreement with treatment plan, will plan to take to OR today 7/3  NPO now for procedure later today  Xray right foot ordered  NWB to right lower extremity  Will discuss with primary team  Request medical optimization and cardiac clearance prior to surgery

## 2024-07-04 ENCOUNTER — TRANSCRIPTION ENCOUNTER (OUTPATIENT)
Age: 54
End: 2024-07-04

## 2024-07-04 DIAGNOSIS — E11.621 TYPE 2 DIABETES MELLITUS WITH FOOT ULCER: ICD-10-CM

## 2024-07-04 LAB
ALBUMIN SERPL ELPH-MCNC: 2.5 G/DL — LOW (ref 3.3–5)
ALP SERPL-CCNC: 111 U/L — SIGNIFICANT CHANGE UP (ref 40–120)
ALT FLD-CCNC: 20 U/L — SIGNIFICANT CHANGE UP (ref 12–78)
ANION GAP SERPL CALC-SCNC: 10 MMOL/L — SIGNIFICANT CHANGE UP (ref 5–17)
ANION GAP SERPL CALC-SCNC: 8 MMOL/L — SIGNIFICANT CHANGE UP (ref 5–17)
AST SERPL-CCNC: 12 U/L — LOW (ref 15–37)
BILIRUB SERPL-MCNC: 0.4 MG/DL — SIGNIFICANT CHANGE UP (ref 0.2–1.2)
BUN SERPL-MCNC: 16 MG/DL — SIGNIFICANT CHANGE UP (ref 7–23)
BUN SERPL-MCNC: 17 MG/DL — SIGNIFICANT CHANGE UP (ref 7–23)
CALCIUM SERPL-MCNC: 8.8 MG/DL — SIGNIFICANT CHANGE UP (ref 8.5–10.1)
CALCIUM SERPL-MCNC: 9 MG/DL — SIGNIFICANT CHANGE UP (ref 8.5–10.1)
CHLORIDE SERPL-SCNC: 104 MMOL/L — SIGNIFICANT CHANGE UP (ref 96–108)
CHLORIDE SERPL-SCNC: 105 MMOL/L — SIGNIFICANT CHANGE UP (ref 96–108)
CO2 SERPL-SCNC: 25 MMOL/L — SIGNIFICANT CHANGE UP (ref 22–31)
CO2 SERPL-SCNC: 25 MMOL/L — SIGNIFICANT CHANGE UP (ref 22–31)
CREAT SERPL-MCNC: 1.4 MG/DL — HIGH (ref 0.5–1.3)
CREAT SERPL-MCNC: 1.6 MG/DL — HIGH (ref 0.5–1.3)
EGFR: 50 ML/MIN/1.73M2 — LOW
EGFR: 59 ML/MIN/1.73M2 — LOW
GLUCOSE BLDC GLUCOMTR-MCNC: 162 MG/DL — HIGH (ref 70–99)
GLUCOSE BLDC GLUCOMTR-MCNC: 182 MG/DL — HIGH (ref 70–99)
GLUCOSE BLDC GLUCOMTR-MCNC: 200 MG/DL — HIGH (ref 70–99)
GLUCOSE BLDC GLUCOMTR-MCNC: 260 MG/DL — HIGH (ref 70–99)
GLUCOSE SERPL-MCNC: 205 MG/DL — HIGH (ref 70–99)
GLUCOSE SERPL-MCNC: 238 MG/DL — HIGH (ref 70–99)
HCT VFR BLD CALC: 36.6 % — LOW (ref 39–50)
HCT VFR BLD CALC: 36.7 % — LOW (ref 39–50)
HGB BLD-MCNC: 11.7 G/DL — LOW (ref 13–17)
HGB BLD-MCNC: 11.9 G/DL — LOW (ref 13–17)
MCHC RBC-ENTMCNC: 29.9 PG — SIGNIFICANT CHANGE UP (ref 27–34)
MCHC RBC-ENTMCNC: 30 PG — SIGNIFICANT CHANGE UP (ref 27–34)
MCHC RBC-ENTMCNC: 32 GM/DL — SIGNIFICANT CHANGE UP (ref 32–36)
MCHC RBC-ENTMCNC: 32.4 GM/DL — SIGNIFICANT CHANGE UP (ref 32–36)
MCV RBC AUTO: 92.2 FL — SIGNIFICANT CHANGE UP (ref 80–100)
MCV RBC AUTO: 93.8 FL — SIGNIFICANT CHANGE UP (ref 80–100)
NRBC # BLD: 0 /100 WBCS — SIGNIFICANT CHANGE UP (ref 0–0)
NRBC # BLD: 0 /100 WBCS — SIGNIFICANT CHANGE UP (ref 0–0)
PLATELET # BLD AUTO: 380 K/UL — SIGNIFICANT CHANGE UP (ref 150–400)
PLATELET # BLD AUTO: 382 K/UL — SIGNIFICANT CHANGE UP (ref 150–400)
POTASSIUM SERPL-MCNC: 4.3 MMOL/L — SIGNIFICANT CHANGE UP (ref 3.5–5.3)
POTASSIUM SERPL-MCNC: 4.3 MMOL/L — SIGNIFICANT CHANGE UP (ref 3.5–5.3)
POTASSIUM SERPL-SCNC: 4.3 MMOL/L — SIGNIFICANT CHANGE UP (ref 3.5–5.3)
POTASSIUM SERPL-SCNC: 4.3 MMOL/L — SIGNIFICANT CHANGE UP (ref 3.5–5.3)
PROT SERPL-MCNC: 6 G/DL — SIGNIFICANT CHANGE UP (ref 6–8.3)
RBC # BLD: 3.9 M/UL — LOW (ref 4.2–5.8)
RBC # BLD: 3.98 M/UL — LOW (ref 4.2–5.8)
RBC # FLD: 14.4 % — SIGNIFICANT CHANGE UP (ref 10.3–14.5)
RBC # FLD: 14.5 % — SIGNIFICANT CHANGE UP (ref 10.3–14.5)
SODIUM SERPL-SCNC: 138 MMOL/L — SIGNIFICANT CHANGE UP (ref 135–145)
SODIUM SERPL-SCNC: 139 MMOL/L — SIGNIFICANT CHANGE UP (ref 135–145)
WBC # BLD: 13.28 K/UL — HIGH (ref 3.8–10.5)
WBC # BLD: 13.33 K/UL — HIGH (ref 3.8–10.5)
WBC # FLD AUTO: 13.28 K/UL — HIGH (ref 3.8–10.5)
WBC # FLD AUTO: 13.33 K/UL — HIGH (ref 3.8–10.5)

## 2024-07-04 PROCEDURE — 99232 SBSQ HOSP IP/OBS MODERATE 35: CPT

## 2024-07-04 RX ADMIN — APIXABAN 5 MILLIGRAM(S): 5 TABLET, FILM COATED ORAL at 06:22

## 2024-07-04 RX ADMIN — HYDROXYZINE PAMOATE 25 MILLIGRAM(S): 50 CAPSULE ORAL at 18:22

## 2024-07-04 RX ADMIN — POTASSIUM CHLORIDE 10 MILLIEQUIVALENT(S): 600 TABLET, FILM COATED, EXTENDED RELEASE ORAL at 18:22

## 2024-07-04 RX ADMIN — Medication 1 APPLICATION(S): at 12:34

## 2024-07-04 RX ADMIN — FUROSEMIDE 20 MILLIGRAM(S): 10 INJECTION, SOLUTION INTRAMUSCULAR; INTRAVENOUS at 06:22

## 2024-07-04 RX ADMIN — Medication 1000 UNIT(S): at 12:33

## 2024-07-04 RX ADMIN — Medication 100 MILLIGRAM(S): at 06:22

## 2024-07-04 RX ADMIN — METFORMIN HYDROCHLORIDE 1000 MILLIGRAM(S): 850 TABLET, FILM COATED ORAL at 06:25

## 2024-07-04 RX ADMIN — Medication 25 MILLIGRAM(S): at 22:14

## 2024-07-04 RX ADMIN — PANTOPRAZOLE SODIUM 40 MILLIGRAM(S): 40 INJECTION, POWDER, FOR SOLUTION INTRAVENOUS at 18:22

## 2024-07-04 RX ADMIN — PREDNISONE 10 MILLIGRAM(S): 10 TABLET ORAL at 06:21

## 2024-07-04 RX ADMIN — NALOXEGOL OXALATE 25 MILLIGRAM(S): 25 TABLET, FILM COATED ORAL at 12:33

## 2024-07-04 RX ADMIN — Medication 2 TABLET(S): at 22:14

## 2024-07-04 RX ADMIN — OXYCODONE HYDROCHLORIDE 10 MILLIGRAM(S): 100 SOLUTION ORAL at 06:22

## 2024-07-04 RX ADMIN — APIXABAN 5 MILLIGRAM(S): 5 TABLET, FILM COATED ORAL at 18:22

## 2024-07-04 RX ADMIN — OXYCODONE HYDROCHLORIDE 10 MILLIGRAM(S): 100 SOLUTION ORAL at 07:00

## 2024-07-04 RX ADMIN — INSULIN LISPRO 2: 100 INJECTION, SOLUTION SUBCUTANEOUS at 12:32

## 2024-07-04 RX ADMIN — Medication 1 GRAM(S): at 18:22

## 2024-07-04 RX ADMIN — POTASSIUM CHLORIDE 10 MILLIEQUIVALENT(S): 600 TABLET, FILM COATED, EXTENDED RELEASE ORAL at 06:21

## 2024-07-04 RX ADMIN — Medication 25 MILLIGRAM(S): at 06:21

## 2024-07-04 RX ADMIN — POLYETHYLENE GLYCOL 3350 17 GRAM(S): 1 POWDER ORAL at 12:34

## 2024-07-04 RX ADMIN — ASPIRIN 81 MILLIGRAM(S): 325 TABLET, FILM COATED ORAL at 12:33

## 2024-07-04 RX ADMIN — OXYCODONE HYDROCHLORIDE 10 MILLIGRAM(S): 100 SOLUTION ORAL at 22:13

## 2024-07-04 RX ADMIN — HYDROXYZINE PAMOATE 25 MILLIGRAM(S): 50 CAPSULE ORAL at 06:22

## 2024-07-04 RX ADMIN — ATORVASTATIN CALCIUM 40 MILLIGRAM(S): 20 TABLET, FILM COATED ORAL at 22:16

## 2024-07-04 RX ADMIN — TIOTROPIUM BROMIDE AND OLODATEROL 2 PUFF(S): 3.124; 2.736 SPRAY, METERED RESPIRATORY (INHALATION) at 06:23

## 2024-07-04 RX ADMIN — INSULIN LISPRO 2: 100 INJECTION, SOLUTION SUBCUTANEOUS at 18:22

## 2024-07-04 RX ADMIN — INSULIN LISPRO 6: 100 INJECTION, SOLUTION SUBCUTANEOUS at 08:38

## 2024-07-04 RX ADMIN — Medication 1 TABLET(S): at 12:33

## 2024-07-04 RX ADMIN — METFORMIN HYDROCHLORIDE 1000 MILLIGRAM(S): 850 TABLET, FILM COATED ORAL at 18:24

## 2024-07-04 RX ADMIN — Medication 10 MILLIGRAM(S): at 12:33

## 2024-07-04 RX ADMIN — VANCOMYCIN HYDROCHLORIDE 166.67 MILLIGRAM(S): KIT at 06:42

## 2024-07-04 RX ADMIN — PANTOPRAZOLE SODIUM 40 MILLIGRAM(S): 40 INJECTION, POWDER, FOR SOLUTION INTRAVENOUS at 06:22

## 2024-07-04 RX ADMIN — Medication 1 GRAM(S): at 06:21

## 2024-07-04 RX ADMIN — OXYCODONE HYDROCHLORIDE 10 MILLIGRAM(S): 100 SOLUTION ORAL at 23:21

## 2024-07-04 RX ADMIN — DILTIAZEM HYDROCHLORIDE 360 MILLIGRAM(S): 240 CAPSULE, EXTENDED RELEASE ORAL at 06:21

## 2024-07-04 RX ADMIN — Medication 100 MILLIGRAM(S): at 18:22

## 2024-07-04 RX ADMIN — Medication 500 MILLIGRAM(S): at 12:33

## 2024-07-04 RX ADMIN — Medication 5 MILLIGRAM(S): at 22:14

## 2024-07-04 NOTE — PROGRESS NOTE ADULT - SUBJECTIVE AND OBJECTIVE BOX
[facility] DIVISION of INFECTIOUS DISEASE  Juventino Whitten MD PhD, Symone Hickey MD, Anne-Marie Li MD, Jeffery Jacobs MD, Ryan Romeo MD  and providing coverage with Melody Armstrong MD  Providing Infectious Disease Consultations at [facility], [facility], [facility]'s    Office# [phone] to schedule follow up appointments  Answering Service for urgent calls or New Consults [phone]  Cell# to text for urgent issues Juventino Whitten [phone]     infectious diseases progress note:    SARAH MORRISON is a 54y y. o. Male patient    Overnight and events of the last 24hrs reviewed    Allergies    ertapenem (Blisters; Rash)  fish (Hives)    Intolerances        ANTIBIOTICS/RELEVANT:  antimicrobials  vancomycin  IVPB 1250 milliGRAM(s) IV Intermittent every 24 hours    immunologic:    OTHER:  acetaminophen     Tablet .. 650 milliGRAM(s) Oral every 6 hours PRN  apixaban 5 milliGRAM(s) Oral two times a day  ascorbic acid 500 milliGRAM(s) Oral daily  aspirin enteric coated 81 milliGRAM(s) Oral daily  atorvastatin 40 milliGRAM(s) Oral at bedtime  benzocaine 20% Spray 1 Spray(s) Topical every 6 hours  calamine/zinc oxide Lotion 1 Application(s) Topical two times a day  chlorhexidine 2% Cloths 1 Application(s) Topical daily  cholecalciferol 1000 Unit(s) Oral daily  clobetasol 0.05% Cream 1 Application(s) Topical two times a day  dextrose 10% Bolus 125 milliLiter(s) IV Bolus once  dextrose 5%. 1000 milliLiter(s) IV Continuous <Continuous>  dextrose 5%. 1000 milliLiter(s) IV Continuous <Continuous>  dextrose 50% Injectable 25 Gram(s) IV Push once  dextrose 50% Injectable 12.5 Gram(s) IV Push once  diltiazem    milliGRAM(s) Oral daily  diphenhydrAMINE 25 milliGRAM(s) Oral every 4 hours PRN  furosemide    Tablet 20 milliGRAM(s) Oral daily  gabapentin 100 milliGRAM(s) Oral every 12 hours  glucagon  Injectable 1 milliGRAM(s) IntraMuscular once  hydrOXYzine hydrochloride 25 milliGRAM(s) Oral two times a day  insulin lispro (ADMELOG) corrective regimen sliding scale   SubCutaneous at bedtime  insulin lispro (ADMELOG) corrective regimen sliding scale   SubCutaneous three times a day before meals  melatonin 5 milliGRAM(s) Oral at bedtime  metFORMIN 1000 milliGRAM(s) Oral two times a day  metoprolol succinate  milliGRAM(s) Oral two times a day  multivitamin 1 Tablet(s) Oral daily  naloxegol 25 milliGRAM(s) Oral daily  ondansetron Injectable 4 milliGRAM(s) IV Push every 6 hours PRN  oxybutynin XL 10 milliGRAM(s) Oral daily  oxyCODONE    IR 10 milliGRAM(s) Oral every 6 hours PRN  pantoprazole    Tablet 40 milliGRAM(s) Oral two times a day  polyethylene glycol 3350 17 Gram(s) Oral daily  potassium chloride    Tablet ER 10 milliEquivalent(s) Oral two times a day  predniSONE   Tablet 10 milliGRAM(s) Oral daily  senna 2 Tablet(s) Oral at bedtime  sodium chloride 0.65% Nasal 1 Spray(s) Both Nostrils three times a day PRN  sodium chloride 0.9% lock flush 10 milliLiter(s) IV Push every 1 hour PRN  sucralfate 1 Gram(s) Oral two times a day  tiotropium 2.5 MICROgram(s)/olodaterol 2.5 MICROgram(s) (STIOLTO) Inhaler 2 Puff(s) Inhalation daily      Objective:  Vital Signs Last 24 Hrs  T(C): 36.6 (04 Jul 2024 05:48), Max: 37.8 (03 Jul 2024 20:42)  T(F): 97.8 (04 Jul 2024 05:48), Max: 100 (03 Jul 2024 20:42)  HR: 76 (04 Jul 2024 06:05) (76 - 88)  BP: 103/77 (04 Jul 2024 06:05) (87/56 - 120/72)  BP(mean): --  RR: 18 (04 Jul 2024 05:48) (18 - 19)  SpO2: 95% (04 Jul 2024 05:48) (93% - 97%)    Parameters below as of 04 Jul 2024 05:48  Patient On (Oxygen Delivery Method): room air        T(C): 36.6 (07-04-24 @ 05:48), Max: 37.8 (07-03-24 @ 20:42)  T(C): 36.6 (07-04-24 @ 05:48), Max: 37.8 (07-03-24 @ 20:42)  T(C): 36.6 (07-04-24 @ 05:48), Max: 37.8 (07-03-24 @ 20:42)    PHYSICAL EXAM:  HEENT: NC atraumatic  Neck: supple  Respiratory: no accessory muscle use, breathing comfortably  Cardiovascular: distant  Gastrointestinal: normal appearing, nondistended  Extremities: no clubbing, no cyanosis, foot wrapped        LABS:                          12.4   15.31 )-----------( 385      ( 02 Jul 2024 10:58 )             38.2       WBC  15.31 07-02 @ 10:58  11.90 06-30 @ 07:10  10.71 06-28 @ 09:45      07-02    136  |  102  |  15  ----------------------------<  208<H>  4.2   |  24  |  1.50<H>    Ca    8.7      02 Jul 2024 10:58  Mg     1.3     07-02    TPro  6.1  /  Alb  2.4<L>  /  TBili  0.4  /  DBili  x   /  AST  13<L>  /  ALT  19  /  AlkPhos  109  07-02      Creatinine: 1.50 mg/dL (07-02-24 @ 10:58)  Creatinine: 1.20 mg/dL (06-30-24 @ 07:10)  Creatinine: 1.30 mg/dL (06-28-24 @ 09:45)        Urinalysis Basic - ( 02 Jul 2024 10:58 )    Color: x / Appearance: x / SG: x / pH: x  Gluc: 208 mg/dL / Ketone: x  / Bili: x / Urobili: x   Blood: x / Protein: x / Nitrite: x   Leuk Esterase: x / RBC: x / WBC x   Sq Epi: x / Non Sq Epi: x / Bacteria: x            INFLAMMATORY MARKERS      MICROBIOLOGY:              RADIOLOGY & ADDITIONAL STUDIES:

## 2024-07-04 NOTE — PROGRESS NOTE ADULT - SUBJECTIVE AND OBJECTIVE BOX
Patient is a 53y old  Male who presents with a chief complaint of Acute respiratory failure with hypoxia (17 Jun 2024 10:29)  Patient seen in follow up for OBI.        PAST MEDICAL HISTORY:  No pertinent past medical history    Diabetes    No pertinent past medical history    Diabetes mellitus with no complication    Afib    Hypertension    BPH (benign prostatic hyperplasia)    Perforated gastric ulcer    Pulmonary embolism    History of non-ST elevation myocardial infarction (NSTEMI)    Osteomyelitis    CAD S/P percutaneous coronary angioplasty    Cerebrovascular accident      MEDICATIONS  (STANDING):  apixaban 5 milliGRAM(s) Oral two times a day  ascorbic acid 500 milliGRAM(s) Oral daily  aspirin enteric coated 81 milliGRAM(s) Oral daily  atorvastatin 40 milliGRAM(s) Oral at bedtime  benzocaine 20% Spray 1 Spray(s) Topical every 6 hours  calamine/zinc oxide Lotion 1 Application(s) Topical two times a day  chlorhexidine 2% Cloths 1 Application(s) Topical daily  cholecalciferol 1000 Unit(s) Oral daily  clobetasol 0.05% Cream 1 Application(s) Topical two times a day  dextrose 10% Bolus 125 milliLiter(s) IV Bolus once  dextrose 5%. 1000 milliLiter(s) (100 mL/Hr) IV Continuous <Continuous>  dextrose 5%. 1000 milliLiter(s) (50 mL/Hr) IV Continuous <Continuous>  dextrose 50% Injectable 25 Gram(s) IV Push once  dextrose 50% Injectable 12.5 Gram(s) IV Push once  diltiazem    milliGRAM(s) Oral daily  furosemide    Tablet 20 milliGRAM(s) Oral daily  gabapentin 100 milliGRAM(s) Oral every 12 hours  glucagon  Injectable 1 milliGRAM(s) IntraMuscular once  hydrOXYzine hydrochloride 25 milliGRAM(s) Oral two times a day  insulin lispro (ADMELOG) corrective regimen sliding scale   SubCutaneous at bedtime  insulin lispro (ADMELOG) corrective regimen sliding scale   SubCutaneous three times a day before meals  melatonin 5 milliGRAM(s) Oral at bedtime  metFORMIN 1000 milliGRAM(s) Oral two times a day  metoprolol succinate  milliGRAM(s) Oral two times a day  multivitamin 1 Tablet(s) Oral daily  naloxegol 25 milliGRAM(s) Oral daily  oxybutynin XL 10 milliGRAM(s) Oral daily  pantoprazole    Tablet 40 milliGRAM(s) Oral two times a day  polyethylene glycol 3350 17 Gram(s) Oral daily  potassium chloride    Tablet ER 10 milliEquivalent(s) Oral two times a day  predniSONE   Tablet 10 milliGRAM(s) Oral daily  senna 2 Tablet(s) Oral at bedtime  sucralfate 1 Gram(s) Oral two times a day  tiotropium 2.5 MICROgram(s)/olodaterol 2.5 MICROgram(s) (STIOLTO) Inhaler 2 Puff(s) Inhalation daily  vancomycin  IVPB 1250 milliGRAM(s) IV Intermittent every 24 hours    MEDICATIONS  (PRN):  acetaminophen     Tablet .. 650 milliGRAM(s) Oral every 6 hours PRN Mild Pain (1 - 3)  diphenhydrAMINE 25 milliGRAM(s) Oral every 4 hours PRN Rash and/or Itching  ondansetron Injectable 4 milliGRAM(s) IV Push every 6 hours PRN Nausea and/or Vomiting  oxyCODONE    IR 10 milliGRAM(s) Oral every 6 hours PRN Severe Pain (7 - 10)  sodium chloride 0.65% Nasal 1 Spray(s) Both Nostrils three times a day PRN Nasal Congestion  sodium chloride 0.9% lock flush 10 milliLiter(s) IV Push every 1 hour PRN Pre/post blood products, medications, blood draw, and to maintain line patency    T(C): 36.6 (07-04-24 @ 05:48), Max: 37.8 (07-03-24 @ 20:42)  HR: 76 (07-04-24 @ 06:05) (76 - 89)  BP: 103/77 (07-04-24 @ 06:05) (87/56 - 120/72)  RR: 18 (07-04-24 @ 05:48)  SpO2: 95% (07-04-24 @ 05:48)  Wt(kg): --  I&O's Detail    03 Jul 2024 07:01  -  04 Jul 2024 07:00  --------------------------------------------------------  IN:  Total IN: 0 mL    OUT:    Indwelling Catheter - Urethral (mL): 900 mL  Total OUT: 900 mL    Total NET: -900 mL                    PHYSICAL EXAM:  General: No distress  Respiratory: b/l air entry  Cardiovascular: S1 S2  Gastrointestinal: soft  Extremities:  Left TMA, Chronic skin changes          LABORATORY:                        11.9   13.28 )-----------( 380      ( 04 Jul 2024 12:02 )             36.7     07-04    138  |  105  |  16  ----------------------------<  205<H>  4.3   |  25  |  1.60<H>    Ca    9.0      04 Jul 2024 12:02    TPro  6.0  /  Alb  2.5<L>  /  TBili  0.4  /  DBili  x   /  AST  12<L>  /  ALT  20  /  AlkPhos  111  07-04    Sodium: 138 mmol/L (07-04 @ 12:02)  Sodium: 139 mmol/L (07-04 @ 11:10)    Potassium: 4.3 mmol/L (07-04 @ 12:02)  Potassium: 4.3 mmol/L (07-04 @ 11:10)    Hemoglobin: 11.9 g/dL (07-04 @ 12:02)  Hemoglobin: 11.7 g/dL (07-04 @ 11:10)  Hemoglobin: 12.4 g/dL (07-02 @ 10:58)    Creatinine, Serum 1.60 (07-04 @ 12:02)  Creatinine, Serum 1.40 (07-04 @ 11:10)  Creatinine, Serum 1.50 (07-02 @ 10:58)        LIVER FUNCTIONS - ( 04 Jul 2024 12:02 )  Alb: 2.5 g/dL / Pro: 6.0 g/dL / ALK PHOS: 111 U/L / ALT: 20 U/L / AST: 12 U/L / GGT: x           Urinalysis Basic - ( 04 Jul 2024 12:02 )    Color: x / Appearance: x / SG: x / pH: x  Gluc: 205 mg/dL / Ketone: x  / Bili: x / Urobili: x   Blood: x / Protein: x / Nitrite: x   Leuk Esterase: x / RBC: x / WBC x   Sq Epi: x / Non Sq Epi: x / Bacteria: x

## 2024-07-04 NOTE — PROGRESS NOTE ADULT - ASSESSMENT
OBI: ATN  Hypokalemia  Metabolic acidosis: OBI, Lactic acidosis  Diabetes  Atrial fibrillation  s/p Cardiac arrest    IV hydration. ? Nephrotoxic ATN. To continue current meds. Monitor BP trend. Titrate BP meds as needed.    Monitor blood sugar levels. Insulin coverage as needed. Avoid nephrotoxic meds as possible. Podiatry follow up.   Will follow electrolytes and renal function trend.  poor plus

## 2024-07-04 NOTE — PROGRESS NOTE ADULT - ASSESSMENT
Claude Villareal is a 52 YO M with past medical history of  AF on Eliquis, indwelling Aguilera, CAD s/p stents, CVA, DMT 2, Hypertension, osteomyelitis s/p debridement, PE, transferred from [facility] for difficulty in breathing.  His recent admission was for osteomyelitis and discharged on IV Vancomycin. On ED arrival he is unresponsive, apneic, no palpable pulse or blood pressure, EKG rhythm HR < 20/min W/O P waves. Patient intubated, Code ACLS activated, IV epinephrine, IV atropine administered, pulse became palpable, still Hypotensive, IV levophed administered. Admitted to ICU consulted,  Patient woke up and agitated try to pull out ETT,  IV propofol started,

## 2024-07-04 NOTE — PROGRESS NOTE ADULT - ASSESSMENT
54 YO M with past medical history of AFib (on Eliquis), indwelling Aguilera, cerebral aneurysm, CAD (s/p stents), CVA, T2DM, HTN, OM (s/p debridement), PE, perforated gastric ulcer s/p omentopexy 2019 with Dr. Mejia, transferred from [facility] for difficulty in breathing s/p cardiac arrest    CAD, Afib, s/p Cardiac Arrest, Mod-Severe MR, HTN  - s/p cardiac arrest x2 w/ AHRF.  s/p intubation with successful extubation.   - Still tolerating RA with no evidence of volume overload    - Trops + but no sig trend. No clear evidence of acute ischemia  - Continue ASA and statin      - Known with chronic AFib   - Rate controlled per flow sheets  - Continue long-acting Cardizem and Metoprolol with more lenient parameter  - BP soft 80-120s   - Continue Eliquis    - TTE normal EF and mod-severe MR  - No sign of volume overload on exam  - Continue  home Lasix 20mg PO daily  - Creatinine:  <--1.50,  <--1.20,  <--1.30  - May need to hold diuretic if creatinine up trending     - ATN with creat normalized  - With diffuse rashes +pruritus.  ID following    - Plan for debridement of Rt heel wound 7/5  - Pt has no active ischemia, decompensated heart failure, unstable arrythmia, or severe stenotic valvular disease. Patient is optimized from cardiovascular standpoint to proceed with planned procedure with routine hemodynamic monitoring.     - Monitor and replete lytes, keep K>4, Mg>2.  - Will continue to follow.    Danny Arce, MS FNP, AGACNP  Nurse Practitioner- Cardiology   Please call on TEAMS 52 YO M with past medical history of AFib (on Eliquis), indwelling Aguilera, cerebral aneurysm, CAD (s/p stents), CVA, T2DM, HTN, OM (s/p debridement), PE, perforated gastric ulcer s/p omentopexy 2019 with Dr. Mejia, transferred from [facility] for difficulty in breathing s/p cardiac arrest    CAD, Afib, s/p Cardiac Arrest, Mod-Severe MR, HTN  - s/p cardiac arrest x2 w/ AHRF.  s/p intubation with successful extubation.   - Still tolerating RA with no evidence of volume overload    - Trops + but no sig trend. No clear evidence of acute ischemia  - Continue ASA and statin      - Known with chronic AFib   - Rate controlled per flow sheets  - Continue long-acting Cardizem and Metoprolol with more lenient parameter  - BP soft 80-120s   - Continue Eliquis    - TTE normal EF and mod-severe MR  - No sign of volume overload on exam   - Creatinine uptrending hold lasix.        - With diffuse rashes +pruritus.  ID following    - Plan for debridement of Rt heel wound 7/5  - Pt has no active ischemia, decompensated heart failure, unstable arrythmia, or severe stenotic valvular disease. Patient is optimized from cardiovascular standpoint to proceed with planned procedure with routine hemodynamic monitoring.     - Monitor and replete lytes, keep K>4, Mg>2.  - Will continue to follow.    Danny Arce, MS FNP, AGACNP  Nurse Practitioner- Cardiology   Please call on TEAMS

## 2024-07-04 NOTE — PROGRESS NOTE ADULT - SUBJECTIVE AND OBJECTIVE BOX
Date of Service 07-04-24 @ 14:29    Patient is a 54y old  Male who presents with a chief complaint of Acute respiratory failure with hypoxia (04 Jul 2024 10:38)      INTERVAL /OVERNIGHT EVENTS: re debridement of heel wound per podiatry    MEDICATIONS  (STANDING):  apixaban 5 milliGRAM(s) Oral two times a day  ascorbic acid 500 milliGRAM(s) Oral daily  aspirin enteric coated 81 milliGRAM(s) Oral daily  atorvastatin 40 milliGRAM(s) Oral at bedtime  benzocaine 20% Spray 1 Spray(s) Topical every 6 hours  calamine/zinc oxide Lotion 1 Application(s) Topical two times a day  chlorhexidine 2% Cloths 1 Application(s) Topical daily  cholecalciferol 1000 Unit(s) Oral daily  clobetasol 0.05% Cream 1 Application(s) Topical two times a day  dextrose 10% Bolus 125 milliLiter(s) IV Bolus once  dextrose 5%. 1000 milliLiter(s) (50 mL/Hr) IV Continuous <Continuous>  dextrose 5%. 1000 milliLiter(s) (100 mL/Hr) IV Continuous <Continuous>  dextrose 50% Injectable 25 Gram(s) IV Push once  dextrose 50% Injectable 12.5 Gram(s) IV Push once  diltiazem    milliGRAM(s) Oral daily  furosemide    Tablet 20 milliGRAM(s) Oral daily  gabapentin 100 milliGRAM(s) Oral every 12 hours  glucagon  Injectable 1 milliGRAM(s) IntraMuscular once  hydrOXYzine hydrochloride 25 milliGRAM(s) Oral two times a day  insulin lispro (ADMELOG) corrective regimen sliding scale   SubCutaneous three times a day before meals  insulin lispro (ADMELOG) corrective regimen sliding scale   SubCutaneous at bedtime  melatonin 5 milliGRAM(s) Oral at bedtime  metFORMIN 1000 milliGRAM(s) Oral two times a day  metoprolol succinate  milliGRAM(s) Oral two times a day  multivitamin 1 Tablet(s) Oral daily  naloxegol 25 milliGRAM(s) Oral daily  oxybutynin XL 10 milliGRAM(s) Oral daily  pantoprazole    Tablet 40 milliGRAM(s) Oral two times a day  polyethylene glycol 3350 17 Gram(s) Oral daily  potassium chloride    Tablet ER 10 milliEquivalent(s) Oral two times a day  predniSONE   Tablet 10 milliGRAM(s) Oral daily  senna 2 Tablet(s) Oral at bedtime  sucralfate 1 Gram(s) Oral two times a day  tiotropium 2.5 MICROgram(s)/olodaterol 2.5 MICROgram(s) (STIOLTO) Inhaler 2 Puff(s) Inhalation daily  vancomycin  IVPB 1250 milliGRAM(s) IV Intermittent every 24 hours    MEDICATIONS  (PRN):  acetaminophen     Tablet .. 650 milliGRAM(s) Oral every 6 hours PRN Mild Pain (1 - 3)  diphenhydrAMINE 25 milliGRAM(s) Oral every 4 hours PRN Rash and/or Itching  ondansetron Injectable 4 milliGRAM(s) IV Push every 6 hours PRN Nausea and/or Vomiting  oxyCODONE    IR 10 milliGRAM(s) Oral every 6 hours PRN Severe Pain (7 - 10)  sodium chloride 0.65% Nasal 1 Spray(s) Both Nostrils three times a day PRN Nasal Congestion  sodium chloride 0.9% lock flush 10 milliLiter(s) IV Push every 1 hour PRN Pre/post blood products, medications, blood draw, and to maintain line patency      Allergies    ertapenem (Blisters; Rash)  fish (Hives)    Intolerances        REVIEW OF SYSTEMS:  CONSTITUTIONAL: No fever, weight loss, or fatigue  EYES: No eye pain, visual disturbances, or discharge  ENMT:  No difficulty hearing, tinnitus, vertigo; No sinus or throat pain  NECK: No pain or stiffness  RESPIRATORY: No cough, wheezing, chills or hemoptysis; No shortness of breath  CARDIOVASCULAR: No chest pain, palpitations, dizziness, or leg swelling  GASTROINTESTINAL: No abdominal or epigastric pain. No nausea, vomiting, or hematemesis; No diarrhea or constipation. No melena or hematochezia.  GENITOURINARY: No dysuria, frequency, hematuria, or incontinence  NEUROLOGICAL: No headaches, memory loss, loss of strength, numbness, or tremors  SKIN: + rashes  LYMPH NODES: No enlarged glands  ENDOCRINE: No heat or cold intolerance; No hair loss; No polydipsia or polyuria  MUSCULOSKELETAL: No joint pain or swelling; No muscle, back, or extremity pain  PSYCHIATRIC: No depression, anxiety, mood swings, or difficulty sleeping  HEME/LYMPH: No easy bruising, or bleeding gums  ALLERGY AND IMMUNOLOGIC: No hives or eczema    Vital Signs Last 24 Hrs  T(C): 36.6 (04 Jul 2024 05:48), Max: 37.8 (03 Jul 2024 20:42)  T(F): 97.8 (04 Jul 2024 05:48), Max: 100 (03 Jul 2024 20:42)  HR: 76 (04 Jul 2024 06:05) (76 - 88)  BP: 103/77 (04 Jul 2024 06:05) (87/56 - 120/72)  BP(mean): --  RR: 18 (04 Jul 2024 05:48) (18 - 18)  SpO2: 95% (04 Jul 2024 05:48) (95% - 97%)    Parameters below as of 04 Jul 2024 05:48  Patient On (Oxygen Delivery Method): room air        PHYSICAL EXAM:  GENERAL: NAD, well-groomed, well-developed  HEAD:  Atraumatic, Normocephalic  EYES: EOMI, PERRLA, conjunctiva and sclera clear  ENMT: No tonsillar erythema, exudates, or enlargement; Moist mucous membranes, Good dentition, No lesions  NECK: Supple, No JVD, Normal thyroid  NERVOUS SYSTEM:  Alert & Oriented X3, Good concentration; Motor Strength 5/5 B/L upper and lower extremities; DTRs 2+ intact and symmetric  CHEST/LUNG: Clear to auscultation bilaterally; No rales, rhonchi, wheezing, or rubs  HEART: Regular rate and rhythm; No murmurs, rubs, or gallops  ABDOMEN: Soft, Nontender, Nondistended; Bowel sounds present  EXTREMITIES:  2+ Peripheral Pulses, No clubbing, cyanosis, or edema  LYMPH: No lymphadenopathy noted  SKIN: + rashes    LABS:                        11.9   13.28 )-----------( 380      ( 04 Jul 2024 12:02 )             36.7     04 Jul 2024 12:02    138    |  105    |  16     ----------------------------<  205    4.3     |  25     |  1.60     Ca    9.0        04 Jul 2024 12:02    TPro  6.0    /  Alb  2.5    /  TBili  0.4    /  DBili  x      /  AST  12     /  ALT  20     /  AlkPhos  111    04 Jul 2024 12:02      Urinalysis Basic - ( 04 Jul 2024 12:02 )    Color: x / Appearance: x / SG: x / pH: x  Gluc: 205 mg/dL / Ketone: x  / Bili: x / Urobili: x   Blood: x / Protein: x / Nitrite: x   Leuk Esterase: x / RBC: x / WBC x   Sq Epi: x / Non Sq Epi: x / Bacteria: x      CAPILLARY BLOOD GLUCOSE      POCT Blood Glucose.: 200 mg/dL (04 Jul 2024 12:29)  POCT Blood Glucose.: 260 mg/dL (04 Jul 2024 08:11)  POCT Blood Glucose.: 198 mg/dL (03 Jul 2024 22:14)  POCT Blood Glucose.: 126 mg/dL (03 Jul 2024 17:18)      RADIOLOGY & ADDITIONAL TESTS:    Notes Reviewed:  [x ] YES  [ ] NO    Care Discussed with Consultants/Other Providers [x ] YES  [ ] NO

## 2024-07-04 NOTE — PROGRESS NOTE ADULT - SUBJECTIVE AND OBJECTIVE BOX
[facility] -- Brittany Lutz, Reynaldo Sweeney, Román, Francesco Boss: Office # [phone]    Follow Up:  s/p Cardiac Arrest, Afib with SVR, Diastolic HF    Subjective/Observations: Patient awake, alert, resting in bed. Denies chest pain, palpitations and dizziness. Denies any difficulty breathing. No orthopnea and PND. Tolerating room air. Right foot DSD intact.     REVIEW OF SYSTEMS: All other review of systems are negative unless indicated above    PAST MEDICAL & SURGICAL HISTORY:  Diabetes    Diabetes mellitus with no complication    Afib    Hypertension    Hypertension    BPH (benign prostatic hyperplasia)    Perforated gastric ulcer s/p emergent ex-lap omentopexy and plication 6/2019    Pulmonary embolism    History of non-ST elevation myocardial infarction (NSTEMI)    Osteomyelitis s/p debridement    CAD S/P percutaneous coronary angioplasty    Cerebrovascular accident      H/O abdominal surgery      Perforated gastric ulcer      Traumatic amputation of left foot, initial encounter    MEDICATIONS  (STANDING):  apixaban 5 milliGRAM(s) Oral two times a day  ascorbic acid 500 milliGRAM(s) Oral daily  aspirin enteric coated 81 milliGRAM(s) Oral daily  atorvastatin 40 milliGRAM(s) Oral at bedtime  benzocaine 20% Spray 1 Spray(s) Topical every 6 hours  calamine/zinc oxide Lotion 1 Application(s) Topical two times a day  chlorhexidine 2% Cloths 1 Application(s) Topical daily  cholecalciferol 1000 Unit(s) Oral daily  clobetasol 0.05% Cream 1 Application(s) Topical two times a day  dextrose 10% Bolus 125 milliLiter(s) IV Bolus once  dextrose 5%. 1000 milliLiter(s) (50 mL/Hr) IV Continuous <Continuous>  dextrose 5%. 1000 milliLiter(s) (100 mL/Hr) IV Continuous <Continuous>  dextrose 50% Injectable 25 Gram(s) IV Push once  dextrose 50% Injectable 12.5 Gram(s) IV Push once  diltiazem    milliGRAM(s) Oral daily  furosemide    Tablet 20 milliGRAM(s) Oral daily  gabapentin 100 milliGRAM(s) Oral every 12 hours  glucagon  Injectable 1 milliGRAM(s) IntraMuscular once  hydrOXYzine hydrochloride 25 milliGRAM(s) Oral two times a day  insulin lispro (ADMELOG) corrective regimen sliding scale   SubCutaneous three times a day before meals  insulin lispro (ADMELOG) corrective regimen sliding scale   SubCutaneous at bedtime  melatonin 5 milliGRAM(s) Oral at bedtime  metFORMIN 1000 milliGRAM(s) Oral two times a day  metoprolol succinate  milliGRAM(s) Oral two times a day  multivitamin 1 Tablet(s) Oral daily  naloxegol 25 milliGRAM(s) Oral daily  oxybutynin XL 10 milliGRAM(s) Oral daily  pantoprazole    Tablet 40 milliGRAM(s) Oral two times a day  polyethylene glycol 3350 17 Gram(s) Oral daily  potassium chloride    Tablet ER 10 milliEquivalent(s) Oral two times a day  predniSONE   Tablet 10 milliGRAM(s) Oral daily  senna 2 Tablet(s) Oral at bedtime  sucralfate 1 Gram(s) Oral two times a day  tiotropium 2.5 MICROgram(s)/olodaterol 2.5 MICROgram(s) (STIOLTO) Inhaler 2 Puff(s) Inhalation daily  vancomycin  IVPB 1250 milliGRAM(s) IV Intermittent every 24 hours    MEDICATIONS  (PRN):  acetaminophen     Tablet .. 650 milliGRAM(s) Oral every 6 hours PRN Mild Pain (1 - 3)  diphenhydrAMINE 25 milliGRAM(s) Oral every 4 hours PRN Rash and/or Itching  ondansetron Injectable 4 milliGRAM(s) IV Push every 6 hours PRN Nausea and/or Vomiting  oxyCODONE    IR 10 milliGRAM(s) Oral every 6 hours PRN Severe Pain (7 - 10)  sodium chloride 0.65% Nasal 1 Spray(s) Both Nostrils three times a day PRN Nasal Congestion  sodium chloride 0.9% lock flush 10 milliLiter(s) IV Push every 1 hour PRN Pre/post blood products, medications, blood draw, and to maintain line patency    Allergies    ertapenem (Blisters; Rash)  fish (Hives)    Intolerances      Vital Signs Last 24 Hrs  T(C): 36.6 (04 Jul 2024 05:48), Max: 37.8 (03 Jul 2024 20:42)  T(F): 97.8 (04 Jul 2024 05:48), Max: 100 (03 Jul 2024 20:42)  HR: 76 (04 Jul 2024 06:05) (76 - 88)  BP: 103/77 (04 Jul 2024 06:05) (87/56 - 120/72)  BP(mean): --  RR: 18 (04 Jul 2024 05:48) (18 - 19)  SpO2: 95% (04 Jul 2024 05:48) (93% - 97%)    Parameters below as of 04 Jul 2024 05:48  Patient On (Oxygen Delivery Method): room air      I&O's Summary    03 Jul 2024 07:01  -  04 Jul 2024 07:00  --------------------------------------------------------  IN: 0 mL / OUT: 900 mL / NET: -900 mL    TELE: Not on telemetry   PHYSICAL EXAM:  Constitutional: NAD, awake and alert  HEENT: Moist Mucous Membranes, Anicteric  Pulmonary: Non-labored, breath sounds are clear bilaterally, Diminished at bases No wheezing, rales or rhonchi  Cardiovascular: Regular, S1 and S2, No murmurs, No rubs, gallops or clicks  Gastrointestinal:  soft, nontender, nondistended   Lymph: No peripheral edema. No lymphadenopathy.   Skin: No visible rashes  Right foot DSD intact.   Psych:  Mood & affect appropriate      LABS: All Labs Reviewed:                        12.4   15.31 )-----------( 385      ( 02 Jul 2024 10:58 )             38.2     02 Jul 2024 10:58    136    |  102    |  15     ----------------------------<  208    4.2     |  24     |  1.50     Ca    8.7        02 Jul 2024 10:58  Mg     1.3       02 Jul 2024 10:58    TPro  6.1    /  Alb  2.4    /  TBili  0.4    /  DBili  x      /  AST  13     /  ALT  19     /  AlkPhos  109    02 Jul 2024 10:58   LIVER FUNCTIONS - ( 02 Jul 2024 10:58 )  Alb: 2.4 g/dL / Pro: 6.1 g/dL / ALK PHOS: 109 U/L / ALT: 19 U/L / AST: 13 U/L / GGT: x           Triiodothyronine, Total (T3 Total): 120 ng/dL (06-14-24 @ 01:25)    12 Lead ECG:   Ventricular Rate 126 BPM    QRS Duration 76 ms    Q-T Interval 302 ms    QTC Calculation(Bazett) 437 ms    R Axis -12 degrees    T Axis 103 degrees    Diagnosis Line Atrial fibrillation with rapid ventricular response  Low voltage QRS  Nonspecific ST and T wave abnormality  Abnormal ECG  When compared with ECG of 14-JUN-2024 11:03,  Questionable change in QRS axis  Nonspecific T wave abnormality now evident in Inferior leads  Nonspecific T wave abnormality, worse in Anterolateral leads  Confirmed by Juventino Soto MD ([id number]) on 6/20/2024 12:52:09 PM (06-20-24 @ 11:37)      TRANSTHORACIC ECHOCARDIOGRAM REPORT  ________________________________________________________________________________                                      _______       Pt. Name:       SARAH MORRISON Study Date:    6/14/2024  MRN:            [id number]         YOB: 1970  Accession #:    [id number]        Age:           53 years  Account#:       [id number]       Gender:        M  Visit ID#  Heart Rate:                      Height:        72.05 in (183.00 cm)  Rhythm:          Weight:        222.66 lb (101.00 kg)  Blood Pressure: 88/53 mmHg       BSA/BMI:       2.23 m² / 30.16 kg/m²  ________________________________________________________________________________________  Referring Physician:    [id number] Hill Brizuela  Interpreting Physician: Rahel Boss MD  Primary Sonographer:    Mounika FELDMAN    CPT:               ECHO TTE WO CON COMP W DOPP - 76924.m  Indication(s):     Heart failure, unspecified - I50.9  Procedure:         Transthoracic echocardiogram with 2-D, M-mode and complete                     spectral and color flow Doppler.  Ordering Location: ICU1  Admission Status:  Inpatient    _______________________________________________________________________________________     CONCLUSIONS:      1. Left ventricular cavity is normal in size. Left ventricular systolic function is normal with an ejection fraction visually estimated at 55 to 60 %. There are no regional wall motion abnormalities seen.   2. Moderate to severe left ventricular hypertrophy.   3. Normal right ventricular cavity size, with normal wall thickness, and normal systolic function.   4. The left atrium is severely dilated.   5. The right atrium is severely dilated.   6. Trileaflet aortic valve with normal systolic excursion. There is focal calcification of the aortic valve leaflets.   7. Mild to moderate mitral regurgitation.   8. There is moderate calcification of the mitral valve annulus.   9. Structurally normal tricuspid valve with normal leaflet excursion.Mild tricuspid regurgitation.  10. The inferior vena cava is normal in size measuring 1.70 cm in diameter, (normal <2.1cm) with normal inspiratory collapse (normal >50%) consistent with normal right atrial pressure (~3, range 0-5mmHg).  11. Estimatedpulmonary artery systolic pressure is 43 mmHg.    ________________________________________________________________________________________  FINDINGS:     Left Ventricle:  The left ventricular cavity is normal in size. Left ventricular systolic function is normal with an ejection fraction visually estimated at 55 to 60%. There are no regional wall motion abnormalities seen. There is normal left ventricular diastolic function, with normal filling pressure. Moderate to severe left ventricular hypertrophy.     Right Ventricle:  The right ventricular cavity is normal in size, with normal wall thickness and normal systolic function.     Left Atrium:  The left atrium is severely dilated.     Right Atrium:  The right atrium is severely dilated.     Aortic Valve:  The aortic valve appears trileaflet with normal systolic excursion. There is focal calcification of the aortic valve leaflets. There is no aortic valve stenosis. There is no evidence of aortic regurgitation.     Mitral Valve:  There is moderate calcification of the mitral valve annulus. There is mild to moderate mitral regurgitation.     Tricuspid Valve:  Structurally normal tricuspid valve with normal leaflet excursion. There is mild tricuspid regurgitation. Estimated pulmonary artery systolic pressure is 43 mmHg.     Pulmonic Valve:  The pulmonic valve was not well visualized.     Systemic Veins:  The inferior vena cava is normal in size measuring 1.70 cm in diameter, (normal <2.1cm) with normal inspiratory collapse (normal >50%) consistent with normal right atrial pressure (~3, range 0-5mmHg).  ____________________________________________________________________  QUANTITATIVE DATA:  Left Ventricle Measurements: (Indexed to BSA)     IVSd (2D):   1.7 cm  LVPWd (2D):  1.4 cm  LVIDd (2D):  4.3 cm  LVIDs (2D):  3.0 cm  LV Mass:     281 g  125.9 g/m²  Visualized LV EF%: 55 to 60%     MV E Vmax:    1.07 m/s  MV A Vmax:    0.00 m/s  e' lateral:   9.68 cm/s  e' medial:    10.90 cm/s  E/e' lateral: 11.05  E/e' medial:  9.82  E/e' Average: 10.40  MV DT:        180 msec    Aorta Measurements: (Normal range) (Indexed to BSA)     Sinuses of Valsalva: 3.40 cm (3.1 - 3.7 cm)       Left Atrium Measurements: (Indexed to BSA)  LA Diam 2D: 4.00 cm       LVOT / RVOT/ Qp/Qs Data: (Indexed to BSA)  LVOT Diameter: 2.10 cm  LVOT Area:     3.46 cm²    Mitral Valve Measurements:     MV E Vmax: 1.1 m/s  MV A Vmax: 0.0 m/s       Tricuspid Valve Measurements:     TR Vmax:          3.2 m/s  TR Peak Gradient: 40.4 mmHg  RA Pressure:      3 mmHg  PASP:             43 mmHg    ________________________________________________________________________________________  Electronically signed on 6/14/2024 at 12:08:59 PM by Rahel Boss MD         *** Final ***

## 2024-07-04 NOTE — PROGRESS NOTE ADULT - ASSESSMENT
54 YO M with  AF on Eliquis, indwelling Aguilera, CAD s/p stents, CVA, DMT 2, Hypertension, osteomyelitis s/p debridement, PE, transferred from [facility] for difficulty in breathing.  His recent admission was for osteomyelitis and discharged on IV Vancomycin. Reported at [facility] he was given a dose of ertapenem and developed difficulty breathing followed by apnea. On ED arrival he was unresponsive, apneic, no palpable pulse or blood pressure, EKG rhythm HR < 20/min W/O P waves. Patient intubated,     RECOMMENDATIONS  1-Asystolic cardiac arrest, Acute hypoxic respiratory failure, Shock  -rec avoiding carbapenems and fine with just Vanco    2-Osteomyeliits right calcaneous  6/4 S/p Selective debridement of wound 04-Jun-2024 10:52:57  Parviz Todd  6/4  TCx MRSA, Escherichia coli ESBL  Right calcaneus bone pathology:  -   Fragments of bone with chronic osteomyelitis.  -   Focal acute osteomyelitis cannot be ruled out.  6/30 adjusted with pharmacy involvement dosing to vancomycin 1250 mg IV q24, dosing per pharmacy protocol and plan had been with inability to rule out osteo   rec 6 weeks so last day 7/18  PICC placed 6/7 7/4-plan now per podiatry to clean up and washout so will return to OR    3-Rash still has not seen dermatology so we have no diagnosis of chronic issue but has improved from recent acute blistering    Thank you for consulting us and involving us in the management of this most interesting and challenging case.  We will follow along in the care of this patient. Please call us at [phone] or text me directly on my cell# at [phone] with any concerns.

## 2024-07-05 LAB
ANION GAP SERPL CALC-SCNC: 9 MMOL/L — SIGNIFICANT CHANGE UP (ref 5–17)
BUN SERPL-MCNC: 18 MG/DL — SIGNIFICANT CHANGE UP (ref 7–23)
CALCIUM SERPL-MCNC: 9.1 MG/DL — SIGNIFICANT CHANGE UP (ref 8.5–10.1)
CHLORIDE SERPL-SCNC: 104 MMOL/L — SIGNIFICANT CHANGE UP (ref 96–108)
CO2 SERPL-SCNC: 27 MMOL/L — SIGNIFICANT CHANGE UP (ref 22–31)
CREAT SERPL-MCNC: 1.2 MG/DL — SIGNIFICANT CHANGE UP (ref 0.5–1.3)
EGFR: 71 ML/MIN/1.73M2 — SIGNIFICANT CHANGE UP
GLUCOSE BLDC GLUCOMTR-MCNC: 145 MG/DL — HIGH (ref 70–99)
GLUCOSE BLDC GLUCOMTR-MCNC: 182 MG/DL — HIGH (ref 70–99)
GLUCOSE BLDC GLUCOMTR-MCNC: 210 MG/DL — HIGH (ref 70–99)
GLUCOSE BLDC GLUCOMTR-MCNC: 239 MG/DL — HIGH (ref 70–99)
GLUCOSE BLDC GLUCOMTR-MCNC: 268 MG/DL — HIGH (ref 70–99)
GLUCOSE SERPL-MCNC: 202 MG/DL — HIGH (ref 70–99)
POTASSIUM SERPL-MCNC: 3.8 MMOL/L — SIGNIFICANT CHANGE UP (ref 3.5–5.3)
POTASSIUM SERPL-SCNC: 3.8 MMOL/L — SIGNIFICANT CHANGE UP (ref 3.5–5.3)
SODIUM SERPL-SCNC: 140 MMOL/L — SIGNIFICANT CHANGE UP (ref 135–145)
VANCOMYCIN TROUGH SERPL-MCNC: 14.5 UG/ML — SIGNIFICANT CHANGE UP (ref 10–20)

## 2024-07-05 PROCEDURE — 11047 DBRDMT BONE EACH ADDL: CPT

## 2024-07-05 PROCEDURE — 88304 TISSUE EXAM BY PATHOLOGIST: CPT | Mod: 26

## 2024-07-05 PROCEDURE — 73630 X-RAY EXAM OF FOOT: CPT | Mod: 26,RT

## 2024-07-05 PROCEDURE — 99232 SBSQ HOSP IP/OBS MODERATE 35: CPT

## 2024-07-05 PROCEDURE — 88311 DECALCIFY TISSUE: CPT | Mod: 26

## 2024-07-05 PROCEDURE — 11044 DBRDMT BONE 1ST 20 SQ CM/<: CPT

## 2024-07-05 DEVICE — BONE FILLER BIOCOMPOSITE STIMULAN RAPID CURE 5CC: Type: IMPLANTABLE DEVICE | Site: RIGHT | Status: FUNCTIONAL

## 2024-07-05 DEVICE — SURGICEL NU-KNIT 6 X 9": Type: IMPLANTABLE DEVICE | Site: RIGHT | Status: FUNCTIONAL

## 2024-07-05 RX ORDER — DIPHENHYDRAMINE HCL 12.5MG/5ML
25 ELIXIR ORAL EVERY 4 HOURS
Refills: 0 | Status: DISCONTINUED | OUTPATIENT
Start: 2024-07-05 | End: 2024-07-08

## 2024-07-05 RX ORDER — DEXTROSE MONOHYDRATE 100 MG/ML
125 INJECTION, SOLUTION INTRAVENOUS ONCE
Refills: 0 | Status: DISCONTINUED | OUTPATIENT
Start: 2024-07-05 | End: 2024-07-08

## 2024-07-05 RX ORDER — HYDROXYZINE PAMOATE 50 MG/1
25 CAPSULE ORAL
Refills: 0 | Status: DISCONTINUED | OUTPATIENT
Start: 2024-07-05 | End: 2024-07-08

## 2024-07-05 RX ORDER — HYDROMORPHONE HCL 0.2 MG/ML
0.5 INJECTION, SOLUTION INTRAVENOUS
Refills: 0 | Status: DISCONTINUED | OUTPATIENT
Start: 2024-07-05 | End: 2024-07-05

## 2024-07-05 RX ORDER — DEXTROSE MONOHYDRATE AND SODIUM CHLORIDE 5; .3 G/100ML; G/100ML
1000 INJECTION, SOLUTION INTRAVENOUS
Refills: 0 | Status: DISCONTINUED | OUTPATIENT
Start: 2024-07-05 | End: 2024-07-08

## 2024-07-05 RX ORDER — PANTOPRAZOLE SODIUM 40 MG/10ML
40 INJECTION, POWDER, FOR SOLUTION INTRAVENOUS
Refills: 0 | Status: DISCONTINUED | OUTPATIENT
Start: 2024-07-05 | End: 2024-07-08

## 2024-07-05 RX ORDER — SODIUM CHLORIDE 0.65 %
1 AEROSOL, SPRAY (ML) NASAL THREE TIMES A DAY
Refills: 0 | Status: DISCONTINUED | OUTPATIENT
Start: 2024-07-05 | End: 2024-07-08

## 2024-07-05 RX ORDER — SODIUM CHLORIDE 0.9 % (FLUSH) 0.9 %
10 SYRINGE (ML) INJECTION
Refills: 0 | Status: DISCONTINUED | OUTPATIENT
Start: 2024-07-05 | End: 2024-07-08

## 2024-07-05 RX ORDER — APIXABAN 5 MG/1
5 TABLET, FILM COATED ORAL
Refills: 0 | Status: DISCONTINUED | OUTPATIENT
Start: 2024-07-06 | End: 2024-07-08

## 2024-07-05 RX ORDER — CALAMINE
1 LOTION (ML) TOPICAL
Refills: 0 | Status: DISCONTINUED | OUTPATIENT
Start: 2024-07-05 | End: 2024-07-08

## 2024-07-05 RX ORDER — METOPROLOL TARTRATE 50 MG
100 TABLET ORAL
Refills: 0 | Status: DISCONTINUED | OUTPATIENT
Start: 2024-07-05 | End: 2024-07-08

## 2024-07-05 RX ORDER — SENNOSIDES 8.6 MG
2 TABLET ORAL AT BEDTIME
Refills: 0 | Status: DISCONTINUED | OUTPATIENT
Start: 2024-07-05 | End: 2024-07-08

## 2024-07-05 RX ORDER — GLUCAGON HYDROCHLORIDE 1 MG/ML
1 INJECTION, POWDER, FOR SOLUTION INTRAMUSCULAR; INTRAVENOUS; SUBCUTANEOUS ONCE
Refills: 0 | Status: DISCONTINUED | OUTPATIENT
Start: 2024-07-05 | End: 2024-07-08

## 2024-07-05 RX ORDER — GABAPENTIN
100 POWDER (GRAM) MISCELLANEOUS EVERY 12 HOURS
Refills: 0 | Status: DISCONTINUED | OUTPATIENT
Start: 2024-07-05 | End: 2024-07-08

## 2024-07-05 RX ORDER — METFORMIN HYDROCHLORIDE 850 MG/1
1000 TABLET, FILM COATED ORAL
Refills: 0 | Status: DISCONTINUED | OUTPATIENT
Start: 2024-07-05 | End: 2024-07-08

## 2024-07-05 RX ORDER — INSULIN REGULAR, HUMAN 100/ML
VIAL (ML) INJECTION EVERY 6 HOURS
Refills: 0 | Status: DISCONTINUED | OUTPATIENT
Start: 2024-07-05 | End: 2024-07-05

## 2024-07-05 RX ORDER — ONDANSETRON HYDROCHLORIDE 2 MG/ML
4 INJECTION INTRAMUSCULAR; INTRAVENOUS ONCE
Refills: 0 | Status: DISCONTINUED | OUTPATIENT
Start: 2024-07-05 | End: 2024-07-05

## 2024-07-05 RX ORDER — ATORVASTATIN CALCIUM 20 MG/1
40 TABLET, FILM COATED ORAL AT BEDTIME
Refills: 0 | Status: DISCONTINUED | OUTPATIENT
Start: 2024-07-05 | End: 2024-07-08

## 2024-07-05 RX ORDER — DEXTROSE 30 % IN WATER 30 %
12.5 VIAL (ML) INTRAVENOUS ONCE
Refills: 0 | Status: DISCONTINUED | OUTPATIENT
Start: 2024-07-05 | End: 2024-07-08

## 2024-07-05 RX ORDER — VANCOMYCIN HYDROCHLORIDE 50 MG/ML
1250 KIT ORAL EVERY 24 HOURS
Refills: 0 | Status: DISCONTINUED | OUTPATIENT
Start: 2024-07-06 | End: 2024-07-08

## 2024-07-05 RX ORDER — DEXTROSE MONOHYDRATE AND SODIUM CHLORIDE 5; .3 G/100ML; G/100ML
1000 INJECTION, SOLUTION INTRAVENOUS
Refills: 0 | Status: DISCONTINUED | OUTPATIENT
Start: 2024-07-05 | End: 2024-07-05

## 2024-07-05 RX ORDER — ACETAMINOPHEN 325 MG
650 TABLET ORAL EVERY 6 HOURS
Refills: 0 | Status: DISCONTINUED | OUTPATIENT
Start: 2024-07-05 | End: 2024-07-08

## 2024-07-05 RX ORDER — SUCRALFATE 1 G
1 TABLET ORAL
Refills: 0 | Status: DISCONTINUED | OUTPATIENT
Start: 2024-07-05 | End: 2024-07-08

## 2024-07-05 RX ORDER — ASPIRIN 325 MG/1
81 TABLET, FILM COATED ORAL DAILY
Refills: 0 | Status: DISCONTINUED | OUTPATIENT
Start: 2024-07-05 | End: 2024-07-08

## 2024-07-05 RX ORDER — DEXTROSE 30 % IN WATER 30 %
15 VIAL (ML) INTRAVENOUS ONCE
Refills: 0 | Status: DISCONTINUED | OUTPATIENT
Start: 2024-07-05 | End: 2024-07-05

## 2024-07-05 RX ORDER — NALOXEGOL OXALATE 25 MG/1
25 TABLET, FILM COATED ORAL DAILY
Refills: 0 | Status: DISCONTINUED | OUTPATIENT
Start: 2024-07-05 | End: 2024-07-08

## 2024-07-05 RX ORDER — INSULIN LISPRO 100 [IU]/ML
INJECTION, SOLUTION SUBCUTANEOUS AT BEDTIME
Refills: 0 | Status: DISCONTINUED | OUTPATIENT
Start: 2024-07-05 | End: 2024-07-08

## 2024-07-05 RX ORDER — OXYCODONE HYDROCHLORIDE 100 MG/5ML
10 SOLUTION ORAL EVERY 6 HOURS
Refills: 0 | Status: DISCONTINUED | OUTPATIENT
Start: 2024-07-05 | End: 2024-07-06

## 2024-07-05 RX ORDER — POLYETHYLENE GLYCOL 3350 1 G/G
17 POWDER ORAL DAILY
Refills: 0 | Status: DISCONTINUED | OUTPATIENT
Start: 2024-07-05 | End: 2024-07-08

## 2024-07-05 RX ORDER — PREDNISONE 10 MG/1
10 TABLET ORAL DAILY
Refills: 0 | Status: DISCONTINUED | OUTPATIENT
Start: 2024-07-05 | End: 2024-07-08

## 2024-07-05 RX ORDER — BENZOCAINE 15 %
1 LIQUID (ML) TOPICAL EVERY 6 HOURS
Refills: 0 | Status: DISCONTINUED | OUTPATIENT
Start: 2024-07-05 | End: 2024-07-08

## 2024-07-05 RX ORDER — POTASSIUM CHLORIDE 600 MG/1
10 TABLET, FILM COATED, EXTENDED RELEASE ORAL
Refills: 0 | Status: DISCONTINUED | OUTPATIENT
Start: 2024-07-05 | End: 2024-07-08

## 2024-07-05 RX ORDER — DEXTROSE 30 % IN WATER 30 %
15 VIAL (ML) INTRAVENOUS ONCE
Refills: 0 | Status: DISCONTINUED | OUTPATIENT
Start: 2024-07-05 | End: 2024-07-08

## 2024-07-05 RX ORDER — DEXTROSE 30 % IN WATER 30 %
25 VIAL (ML) INTRAVENOUS ONCE
Refills: 0 | Status: DISCONTINUED | OUTPATIENT
Start: 2024-07-05 | End: 2024-07-08

## 2024-07-05 RX ORDER — TIOTROPIUM BROMIDE AND OLODATEROL 3.124; 2.736 UG/1; UG/1
2 SPRAY, METERED RESPIRATORY (INHALATION) DAILY
Refills: 0 | Status: DISCONTINUED | OUTPATIENT
Start: 2024-07-05 | End: 2024-07-08

## 2024-07-05 RX ORDER — DILTIAZEM HYDROCHLORIDE 240 MG/1
360 CAPSULE, EXTENDED RELEASE ORAL DAILY
Refills: 0 | Status: DISCONTINUED | OUTPATIENT
Start: 2024-07-05 | End: 2024-07-08

## 2024-07-05 RX ADMIN — PANTOPRAZOLE SODIUM 40 MILLIGRAM(S): 40 INJECTION, POWDER, FOR SOLUTION INTRAVENOUS at 22:57

## 2024-07-05 RX ADMIN — Medication 500 MILLIGRAM(S): at 12:05

## 2024-07-05 RX ADMIN — TIOTROPIUM BROMIDE AND OLODATEROL 2 PUFF(S): 3.124; 2.736 SPRAY, METERED RESPIRATORY (INHALATION) at 06:29

## 2024-07-05 RX ADMIN — ATORVASTATIN CALCIUM 40 MILLIGRAM(S): 20 TABLET, FILM COATED ORAL at 22:58

## 2024-07-05 RX ADMIN — CLOBETASOL PROPIONATE 1 APPLICATION(S): 0.5 CREAM TOPICAL at 06:30

## 2024-07-05 RX ADMIN — ASPIRIN 81 MILLIGRAM(S): 325 TABLET, FILM COATED ORAL at 12:04

## 2024-07-05 RX ADMIN — NALOXEGOL OXALATE 25 MILLIGRAM(S): 25 TABLET, FILM COATED ORAL at 12:04

## 2024-07-05 RX ADMIN — Medication 100 MILLIGRAM(S): at 06:27

## 2024-07-05 RX ADMIN — HYDROXYZINE PAMOATE 25 MILLIGRAM(S): 50 CAPSULE ORAL at 22:57

## 2024-07-05 RX ADMIN — POTASSIUM CHLORIDE 10 MILLIEQUIVALENT(S): 600 TABLET, FILM COATED, EXTENDED RELEASE ORAL at 06:28

## 2024-07-05 RX ADMIN — VANCOMYCIN HYDROCHLORIDE 166.67 MILLIGRAM(S): KIT at 06:29

## 2024-07-05 RX ADMIN — OXYCODONE HYDROCHLORIDE 10 MILLIGRAM(S): 100 SOLUTION ORAL at 12:50

## 2024-07-05 RX ADMIN — PANTOPRAZOLE SODIUM 40 MILLIGRAM(S): 40 INJECTION, POWDER, FOR SOLUTION INTRAVENOUS at 06:27

## 2024-07-05 RX ADMIN — Medication 3: at 12:52

## 2024-07-05 RX ADMIN — METFORMIN HYDROCHLORIDE 1000 MILLIGRAM(S): 850 TABLET, FILM COATED ORAL at 22:56

## 2024-07-05 RX ADMIN — Medication 25 MILLIGRAM(S): at 22:58

## 2024-07-05 RX ADMIN — Medication 100 MILLIGRAM(S): at 06:28

## 2024-07-05 RX ADMIN — OXYCODONE HYDROCHLORIDE 10 MILLIGRAM(S): 100 SOLUTION ORAL at 13:45

## 2024-07-05 RX ADMIN — PREDNISONE 10 MILLIGRAM(S): 10 TABLET ORAL at 06:28

## 2024-07-05 RX ADMIN — Medication 100 MILLIGRAM(S): at 22:58

## 2024-07-05 RX ADMIN — HYDROMORPHONE HCL 0.5 MILLIGRAM(S): 0.2 INJECTION, SOLUTION INTRAVENOUS at 22:10

## 2024-07-05 RX ADMIN — Medication 2: at 17:51

## 2024-07-05 RX ADMIN — Medication 5 MILLIGRAM(S): at 22:59

## 2024-07-05 RX ADMIN — APIXABAN 5 MILLIGRAM(S): 5 TABLET, FILM COATED ORAL at 06:27

## 2024-07-05 RX ADMIN — Medication 1 GRAM(S): at 22:57

## 2024-07-05 RX ADMIN — DILTIAZEM HYDROCHLORIDE 360 MILLIGRAM(S): 240 CAPSULE, EXTENDED RELEASE ORAL at 06:26

## 2024-07-05 RX ADMIN — Medication 10 MILLIGRAM(S): at 12:04

## 2024-07-05 RX ADMIN — HYDROXYZINE PAMOATE 25 MILLIGRAM(S): 50 CAPSULE ORAL at 06:26

## 2024-07-05 RX ADMIN — DEXTROSE MONOHYDRATE AND SODIUM CHLORIDE 75 MILLILITER(S): 5; .3 INJECTION, SOLUTION INTRAVENOUS at 21:05

## 2024-07-05 RX ADMIN — Medication 25 MILLIGRAM(S): at 12:50

## 2024-07-05 RX ADMIN — Medication 1000 UNIT(S): at 12:05

## 2024-07-05 RX ADMIN — Medication 1 APPLICATION(S): at 06:30

## 2024-07-05 RX ADMIN — HYDROMORPHONE HCL 0.5 MILLIGRAM(S): 0.2 INJECTION, SOLUTION INTRAVENOUS at 21:05

## 2024-07-05 RX ADMIN — Medication 1 APPLICATION(S): at 12:11

## 2024-07-05 RX ADMIN — Medication 2 TABLET(S): at 22:59

## 2024-07-05 RX ADMIN — Medication 1 TABLET(S): at 12:04

## 2024-07-05 RX ADMIN — Medication 1 GRAM(S): at 06:26

## 2024-07-05 RX ADMIN — Medication 25 MILLIGRAM(S): at 06:26

## 2024-07-05 RX ADMIN — Medication 100 MILLIGRAM(S): at 22:57

## 2024-07-05 RX ADMIN — POTASSIUM CHLORIDE 10 MILLIEQUIVALENT(S): 600 TABLET, FILM COATED, EXTENDED RELEASE ORAL at 22:59

## 2024-07-05 NOTE — PROGRESS NOTE ADULT - ASSESSMENT
Claude Villareal is a 54 YO M with past medical history of  AF on Eliquis, indwelling Aguilera, CAD s/p stents, CVA, DMT 2, Hypertension, osteomyelitis s/p debridement, PE, transferred from [facility] for difficulty in breathing.  His recent admission was for osteomyelitis and discharged on IV Vancomycin. On ED arrival he is unresponsive, apneic, no palpable pulse or blood pressure, EKG rhythm HR < 20/min W/O P waves. Patient intubated, Code ACLS activated, IV epinephrine, IV atropine administered, pulse became palpable, still Hypotensive, IV levophed administered. Admitted to ICU consulted,  Patient woke up and agitated try to pull out ETT,  IV propofol started,

## 2024-07-05 NOTE — PHARMACOTHERAPY INTERVENTION NOTE - COMMENTS
Vancomycin dosing by pharmacy:  1. Indication for the vancomycin: R heel wound/osteomyelitis  2. Requesting Provider: Dr. Whitten  3. Current plan and dosing regimen: 1250 mg Q24H  4. Day of therapy: 31  5. Cultures: Tissue Cx 5/26/24: E faecalis + MRSA. Tissue Cx 6/4/24: ESBL E coli + E faecalis. R heel surgical swab 6/14/24: MRSA + citrobacter freundii + pseudomonas stutzeri.  6. Target trough or AUC/BAYLEE: 400-600 hr*ug/mL  7. Day and time level is due: 7/8 05:00  8. Previous levels: 6/4 (19:31): 22, 6/5 (05:47): 14, 6/6 (20:20): 14, 6/9 (04:58): 14.4, 6/11 (04:30): 15.7, 6/13 (05:54): 17.3, 6/14 (01:25): 15.2, 6/14 (04:30): 16.1, 6/15 (5:40): 17.5, 6/16 (5:38): 15.3, 6/17 (05:52): 13.2, 6/18 (05:50): 11.8, 6/19 (06:15): 21.3, 6/20 (08:52): 21.6, 6/21 (09:30): 37.5, 06/22 (0945): 11.1, 06/23 (09:30): 15.9, 06/24 (08:00): 19.6, 06/27 (21:47): 18.2, 6/29 (10:05): 36.4 (taken during infusion), 6/30 (07:10): 20.3 7/3 0620: 14.2, 7/5 0615: 14.5  9. Expected 24-hour AUC: 532 hr*ug/m

## 2024-07-05 NOTE — SOCIAL WORK PROGRESS NOTE - NSSWPROGRESSNOTE_GEN_ALL_CORE
As per rounds, pt for debridement today at 6pm, plan remains for DC to [facility] when medically cleared, SW will continue to follow to ensure safe transition upon Dc from hospital.

## 2024-07-05 NOTE — PROGRESS NOTE ADULT - SUBJECTIVE AND OBJECTIVE BOX
Patient is a 53y old  Male who presents with a chief complaint of Acute respiratory failure with hypoxia (17 Jun 2024 10:29)  Patient seen in follow up for OBI.        PAST MEDICAL HISTORY:  No pertinent past medical history    Diabetes    No pertinent past medical history    Diabetes mellitus with no complication    Afib    Hypertension    BPH (benign prostatic hyperplasia)    Perforated gastric ulcer    Pulmonary embolism    History of non-ST elevation myocardial infarction (NSTEMI)    Osteomyelitis    CAD S/P percutaneous coronary angioplasty    Cerebrovascular accident      MEDICATIONS  (STANDING):  apixaban 5 milliGRAM(s) Oral two times a day  ascorbic acid 500 milliGRAM(s) Oral daily  aspirin enteric coated 81 milliGRAM(s) Oral daily  atorvastatin 40 milliGRAM(s) Oral at bedtime  benzocaine 20% Spray 1 Spray(s) Topical every 6 hours  calamine/zinc oxide Lotion 1 Application(s) Topical two times a day  chlorhexidine 2% Cloths 1 Application(s) Topical daily  cholecalciferol 1000 Unit(s) Oral daily  clobetasol 0.05% Cream 1 Application(s) Topical two times a day  dextrose 10% Bolus 125 milliLiter(s) IV Bolus once  dextrose 5%. 1000 milliLiter(s) (50 mL/Hr) IV Continuous <Continuous>  dextrose 5%. 1000 milliLiter(s) (100 mL/Hr) IV Continuous <Continuous>  dextrose 50% Injectable 25 Gram(s) IV Push once  dextrose 50% Injectable 12.5 Gram(s) IV Push once  dextrose Oral Gel 15 Gram(s) Oral once  diltiazem    milliGRAM(s) Oral daily  gabapentin 100 milliGRAM(s) Oral every 12 hours  glucagon  Injectable 1 milliGRAM(s) IntraMuscular once  hydrOXYzine hydrochloride 25 milliGRAM(s) Oral two times a day  insulin lispro (ADMELOG) corrective regimen sliding scale   SubCutaneous at bedtime  insulin regular  human corrective regimen sliding scale   SubCutaneous every 6 hours  melatonin 5 milliGRAM(s) Oral at bedtime  metFORMIN 1000 milliGRAM(s) Oral two times a day  metoprolol succinate  milliGRAM(s) Oral two times a day  multivitamin 1 Tablet(s) Oral daily  naloxegol 25 milliGRAM(s) Oral daily  oxybutynin XL 10 milliGRAM(s) Oral daily  pantoprazole    Tablet 40 milliGRAM(s) Oral two times a day  polyethylene glycol 3350 17 Gram(s) Oral daily  potassium chloride    Tablet ER 10 milliEquivalent(s) Oral two times a day  predniSONE   Tablet 10 milliGRAM(s) Oral daily  senna 2 Tablet(s) Oral at bedtime  sucralfate 1 Gram(s) Oral two times a day  tiotropium 2.5 MICROgram(s)/olodaterol 2.5 MICROgram(s) (STIOLTO) Inhaler 2 Puff(s) Inhalation daily  vancomycin  IVPB 1250 milliGRAM(s) IV Intermittent every 24 hours    MEDICATIONS  (PRN):  acetaminophen     Tablet .. 650 milliGRAM(s) Oral every 6 hours PRN Mild Pain (1 - 3)  diphenhydrAMINE 25 milliGRAM(s) Oral every 4 hours PRN Rash and/or Itching  ondansetron Injectable 4 milliGRAM(s) IV Push every 6 hours PRN Nausea and/or Vomiting  oxyCODONE    IR 10 milliGRAM(s) Oral every 6 hours PRN Severe Pain (7 - 10)  sodium chloride 0.65% Nasal 1 Spray(s) Both Nostrils three times a day PRN Nasal Congestion  sodium chloride 0.9% lock flush 10 milliLiter(s) IV Push every 1 hour PRN Pre/post blood products, medications, blood draw, and to maintain line patency    T(C): 37 (07-05-24 @ 11:48), Max: 37.8 (07-03-24 @ 20:42)  HR: 51 (07-05-24 @ 11:48) (51 - 90)  BP: 99/63 (07-05-24 @ 11:48) (87/56 - 120/72)  RR: 18 (07-05-24 @ 11:48)  SpO2: 92% (07-05-24 @ 11:48)  Wt(kg): --  I&O's Detail    04 Jul 2024 07:01  -  05 Jul 2024 07:00  --------------------------------------------------------  IN:  Total IN: 0 mL    OUT:    Incontinent per Condom Catheter (mL): 1000 mL    Indwelling Catheter - Urethral (mL): 500 mL  Total OUT: 1500 mL    Total NET: -1500 mL                      PHYSICAL EXAM:  General: No distress  Respiratory: b/l air entry  Cardiovascular: S1 S2  Gastrointestinal: soft  Extremities:  Left TMA, Chronic skin changes          LABORATORY:                        11.9   13.28 )-----------( 380      ( 04 Jul 2024 12:02 )             36.7     07-05    140  |  104  |  18  ----------------------------<  202<H>  3.8   |  27  |  1.20    Ca    9.1      05 Jul 2024 06:15    TPro  6.0  /  Alb  2.5<L>  /  TBili  0.4  /  DBili  x   /  AST  12<L>  /  ALT  20  /  AlkPhos  111  07-04    Sodium: 140 mmol/L (07-05 @ 06:15)  Sodium: 138 mmol/L (07-04 @ 12:02)  Sodium: 139 mmol/L (07-04 @ 11:10)    Potassium: 3.8 mmol/L (07-05 @ 06:15)  Potassium: 4.3 mmol/L (07-04 @ 12:02)  Potassium: 4.3 mmol/L (07-04 @ 11:10)    Hemoglobin: 11.9 g/dL (07-04 @ 12:02)  Hemoglobin: 11.7 g/dL (07-04 @ 11:10)    Creatinine, Serum 1.20 (07-05 @ 06:15)  Creatinine, Serum 1.60 (07-04 @ 12:02)  Creatinine, Serum 1.40 (07-04 @ 11:10)        LIVER FUNCTIONS - ( 04 Jul 2024 12:02 )  Alb: 2.5 g/dL / Pro: 6.0 g/dL / ALK PHOS: 111 U/L / ALT: 20 U/L / AST: 12 U/L / GGT: x           Urinalysis Basic - ( 05 Jul 2024 06:15 )    Color: x / Appearance: x / SG: x / pH: x  Gluc: 202 mg/dL / Ketone: x  / Bili: x / Urobili: x   Blood: x / Protein: x / Nitrite: x   Leuk Esterase: x / RBC: x / WBC x   Sq Epi: x / Non Sq Epi: x / Bacteria: x

## 2024-07-05 NOTE — PROGRESS NOTE ADULT - SUBJECTIVE AND OBJECTIVE BOX
[facility] -- Brittany Lutz Pannella, Patel, Savella Goodger, Cohen  Office # [phone]      Follow Up:  s/p Cardiac Arrest, Afib with SVR, Diastolic HF    Subjective/Observations: Patient awake, alert, resting in bed. Denies chest pain, palpitations and dizziness. Denies any difficulty breathing. No orthopnea and PND. Tolerating room air. Right foot DSD intact.       REVIEW OF SYSTEMS: All other review of systems is negative unless indicated above    PAST MEDICAL & SURGICAL HISTORY:  Diabetes      Diabetes mellitus with no complication      Afib      Hypertension      BPH (benign prostatic hyperplasia)      Perforated gastric ulcer  s/p emergent ex-lap omentopexy and plication 2019      Pulmonary embolism      History of non-ST elevation myocardial infarction (NSTEMI)      Osteomyelitis  s/p debridement      CAD S/P percutaneous coronary angioplasty      Cerebrovascular accident      H/O abdominal surgery      Perforated gastric ulcer      Traumatic amputation of left foot, initial encounter          MEDICATIONS  (STANDING):  apixaban 5 milliGRAM(s) Oral two times a day  ascorbic acid 500 milliGRAM(s) Oral daily  aspirin enteric coated 81 milliGRAM(s) Oral daily  atorvastatin 40 milliGRAM(s) Oral at bedtime  benzocaine 20% Spray 1 Spray(s) Topical every 6 hours  calamine/zinc oxide Lotion 1 Application(s) Topical two times a day  chlorhexidine 2% Cloths 1 Application(s) Topical daily  cholecalciferol 1000 Unit(s) Oral daily  clobetasol 0.05% Cream 1 Application(s) Topical two times a day  dextrose 10% Bolus 125 milliLiter(s) IV Bolus once  dextrose 5%. 1000 milliLiter(s) (50 mL/Hr) IV Continuous <Continuous>  dextrose 5%. 1000 milliLiter(s) (100 mL/Hr) IV Continuous <Continuous>  dextrose 50% Injectable 25 Gram(s) IV Push once  dextrose 50% Injectable 12.5 Gram(s) IV Push once  diltiazem    milliGRAM(s) Oral daily  gabapentin 100 milliGRAM(s) Oral every 12 hours  glucagon  Injectable 1 milliGRAM(s) IntraMuscular once  hydrOXYzine hydrochloride 25 milliGRAM(s) Oral two times a day  insulin lispro (ADMELOG) corrective regimen sliding scale   SubCutaneous at bedtime  insulin lispro (ADMELOG) corrective regimen sliding scale   SubCutaneous three times a day before meals  melatonin 5 milliGRAM(s) Oral at bedtime  metFORMIN 1000 milliGRAM(s) Oral two times a day  metoprolol succinate  milliGRAM(s) Oral two times a day  multivitamin 1 Tablet(s) Oral daily  naloxegol 25 milliGRAM(s) Oral daily  oxybutynin XL 10 milliGRAM(s) Oral daily  pantoprazole    Tablet 40 milliGRAM(s) Oral two times a day  polyethylene glycol 3350 17 Gram(s) Oral daily  potassium chloride    Tablet ER 10 milliEquivalent(s) Oral two times a day  predniSONE   Tablet 10 milliGRAM(s) Oral daily  senna 2 Tablet(s) Oral at bedtime  sucralfate 1 Gram(s) Oral two times a day  tiotropium 2.5 MICROgram(s)/olodaterol 2.5 MICROgram(s) (STIOLTO) Inhaler 2 Puff(s) Inhalation daily  vancomycin  IVPB 1250 milliGRAM(s) IV Intermittent every 24 hours    MEDICATIONS  (PRN):  acetaminophen     Tablet .. 650 milliGRAM(s) Oral every 6 hours PRN Mild Pain (1 - 3)  diphenhydrAMINE 25 milliGRAM(s) Oral every 4 hours PRN Rash and/or Itching  ondansetron Injectable 4 milliGRAM(s) IV Push every 6 hours PRN Nausea and/or Vomiting  oxyCODONE    IR 10 milliGRAM(s) Oral every 6 hours PRN Severe Pain (7 - 10)  sodium chloride 0.65% Nasal 1 Spray(s) Both Nostrils three times a day PRN Nasal Congestion  sodium chloride 0.9% lock flush 10 milliLiter(s) IV Push every 1 hour PRN Pre/post blood products, medications, blood draw, and to maintain line patency      Allergies    ertapenem (Blisters; Rash)  fish (Hives)    Intolerances        Vital Signs Last 24 Hrs  T(C): 36.6 (2024 04:40), Max: 36.9 (2024 20:16)  T(F): 97.9 (2024 04:40), Max: 98.4 (2024 20:16)  HR: 90 (2024 04:40) (84 - 90)  BP: 107/66 (2024 04:40) (93/64 - 114/80)  BP(mean): --  RR: 18 (2024 04:40) (18 - 18)  SpO2: 91% (2024 04:40) (91% - 94%)    Parameters below as of 2024 04:40  Patient On (Oxygen Delivery Method): room air        I&O's Summary    2024 07:01  -  2024 07:00  --------------------------------------------------------  IN: 0 mL / OUT: 1500 mL / NET: -1500 mL        TELE: Not on telemetry   PHYSICAL EXAM:  Constitutional: NAD, awake and alert  HEENT: Moist Mucous Membranes, Anicteric  Pulmonary: Non-labored, breath sounds are clear bilaterally, Diminished at bases No wheezing, rales or rhonchi  Cardiovascular: Regular, S1 and S2, murmur   Gastrointestinal:  soft, nontender, nondistended   Lymph: No peripheral edema. No lymphadenopathy.   Skin: No visible rashes  Right foot DSD intact.   Psych:  Mood & affect appropriate    LABS: All Labs Reviewed:                        11.9   13.28 )-----------( 380      ( 2024 12:02 )             36.7                         11.7   13.33 )-----------( 382      ( 2024 11:10 )             36.6                         12.4   15.31 )-----------( 385      ( 2024 10:58 )             38.2     2024 12:02    138    |  105    |  16     ----------------------------<  205    4.3     |  25     |  1.60   2024 11:10    139    |  104    |  17     ----------------------------<  238    4.3     |  25     |  1.40   2024 10:58    136    |  102    |  15     ----------------------------<  208    4.2     |  24     |  1.50     Ca    9.0        2024 12:02  Ca    8.8        2024 11:10  Ca    8.7        2024 10:58  Mg     1.3       2024 10:58    TPro  6.0    /  Alb  2.5    /  TBili  0.4    /  DBili  x      /  AST  12     /  ALT  20     /  AlkPhos  111    2024 12:02  TPro  6.1    /  Alb  2.4    /  TBili  0.4    /  DBili  x      /  AST  13     /  ALT  19     /  AlkPhos  109    2024 10:58             EC Lead ECG:   Ventricular Rate 126 BPM    QRS Duration 76 ms    Q-T Interval 302 ms    QTC Calculation(Bazett) 437 ms    R Axis -12 degrees    T Axis 103 degrees    Diagnosis Line Atrial fibrillation with rapid ventricular response  Low voltage QRS  Nonspecific ST and T wave abnormality  Abnormal ECG  When compared with ECG of 2024 11:03,  Questionable change in QRS axis  Nonspecific T wave abnormality now evident in Inferior leads  Nonspecific T wave abnormality, worse in Anterolateral leads  Confirmed by Juventino Soto MD ([id number]) on 2024 12:52:09 PM (24 @ 11:37)      TRANSTHORACIC ECHOCARDIOGRAM REPORT  ________________________________________________________________________________                                      _______       Pt. Name:       SARAH MORRISON Study Date:    2024  MRN:            [id number]         YOB: 1970  Accession #:    [id number]        Age:           53 years  Account#:       [id number]       Gender:        M  Visit ID#  Heart Rate:                      Height:        72.05 in (183.00 cm)  Rhythm:          Weight:        222.66 lb (101.00 kg)  Blood Pressure: 88/53 mmHg       BSA/BMI:       2.23 m² / 30.16 kg/m²  ________________________________________________________________________________________  Referring Physician:    [id number] Hill Brizuela  Interpreting Physician: Rahel Boss MD  Primary Sonographer:    Mounika FELDMAN    CPT:               ECHO TTE WO CON COMP W DOPP - 82810.m  Indication(s):     Heart failure, unspecified - I50.9  Procedure:         Transthoracic echocardiogram with 2-D, M-mode and complete                     spectral and color flow Doppler.  Ordering Location: ICU1  Admission Status:  Inpatient    _______________________________________________________________________________________     CONCLUSIONS:      1. Left ventricular cavity is normal in size. Left ventricular systolic function is normal with an ejection fraction visually estimated at 55 to 60 %. There are no regional wall motion abnormalities seen.   2. Moderate to severe left ventricular hypertrophy.   3. Normal right ventricular cavity size, with normal wall thickness, and normal systolic function.   4. The left atrium is severely dilated.   5. The right atrium is severely dilated.   6. Trileaflet aortic valve with normal systolic excursion. There is focal calcification of the aortic valve leaflets.   7. Mild to moderate mitral regurgitation.   8. There is moderate calcification of the mitral valve annulus.   9. Structurally normal tricuspid valve with normal leaflet excursion.Mild tricuspid regurgitation.  10. The inferior vena cava is normal in size measuring 1.70 cm in diameter, (normal <2.1cm) with normal inspiratory collapse (normal >50%) consistent with normal right atrial pressure (~3, range 0-5mmHg).  11. Estimatedpulmonary artery systolic pressure is 43 mmHg.    ________________________________________________________________________________________  FINDINGS:     Left Ventricle:  The left ventricular cavity is normal in size. Left ventricular systolic function is normal with an ejection fraction visually estimated at 55 to 60%. There are no regional wall motion abnormalities seen. There is normal left ventricular diastolic function, with normal filling pressure. Moderate to severe left ventricular hypertrophy.     Right Ventricle:  The right ventricular cavity is normal in size, with normal wall thickness and normal systolic function.     Left Atrium:  The left atrium is severely dilated.     Right Atrium:  The right atrium is severely dilated.     Aortic Valve:  The aortic valve appears trileaflet with normal systolic excursion. There is focal calcification of the aortic valve leaflets. There is no aortic valve stenosis. There is no evidence of aortic regurgitation.     Mitral Valve:  There is moderate calcification of the mitral valve annulus. There is mild to moderate mitral regurgitation.     Tricuspid Valve:  Structurally normal tricuspid valve with normal leaflet excursion. There is mild tricuspid regurgitation. Estimated pulmonary artery systolic pressure is 43 mmHg.     Pulmonic Valve:  The pulmonic valve was not well visualized.     Systemic Veins:  The inferior vena cava is normal in size measuring 1.70 cm in diameter, (normal <2.1cm) with normal inspiratory collapse (normal >50%) consistent with normal right atrial pressure (~3, range 0-5mmHg).  ____________________________________________________________________  QUANTITATIVE DATA:  Left Ventricle Measurements: (Indexed to BSA)     IVSd (2D):   1.7 cm  LVPWd (2D):  1.4 cm  LVIDd (2D):  4.3 cm  LVIDs (2D):  3.0 cm  LV Mass:     281 g  125.9 g/m²  Visualized LV EF%: 55 to 60%     MV E Vmax:    1.07 m/s  MV A Vmax:    0.00 m/s  e' lateral:   9.68 cm/s  e' medial:    10.90 cm/s  E/e' lateral: 11.05  E/e' medial:  9.82  E/e' Average: 10.40  MV DT:        180 msec    Aorta Measurements: (Normal range) (Indexed to BSA)     Sinuses of Valsalva: 3.40 cm (3.1 - 3.7 cm)       Left Atrium Measurements: (Indexed to BSA)  LA Diam 2D: 4.00 cm       LVOT / RVOT/ Qp/Qs Data: (Indexed to BSA)  LVOT Diameter: 2.10 cm  LVOT Area:     3.46 cm²    Mitral Valve Measurements:     MV E Vmax: 1.1 m/s  MV A Vmax: 0.0 m/s       Tricuspid Valve Measurements:     TR Vmax:          3.2 m/s  TR Peak Gradient: 40.4 mmHg  RA Pressure:      3 mmHg  PASP:             43 mmHg    ________________________________________________________________________________________  Electronically signed on 2024 at 12:08:59 PM by Rahel Boss MD         *** Final ***      Radiology:

## 2024-07-05 NOTE — CASE MANAGEMENT PROGRESS NOTE - NSCMPROGRESSNOTE_GEN_ALL_CORE
Discussed pt in rounds, pt remains acute, receiving IV antibiotics, MRSA Right foot. Plan for OR this afternoon for debridement.  SW following for return to nursing home.

## 2024-07-05 NOTE — BRIEF OPERATIVE NOTE - NSICDXBRIEFPREOP_GEN_ALL_CORE_FT
PRE-OP DIAGNOSIS:  Skin ulcer, stage 3 05-Jul-2024 20:29:38  Cristi Mcbride  Osteomyelitis of foot 05-Jul-2024 20:30:03  Cristi Mcbride

## 2024-07-05 NOTE — PROGRESS NOTE ADULT - ASSESSMENT
OBI: ATN  Hypokalemia  Metabolic acidosis: OBI, Lactic acidosis  Diabetes  Atrial fibrillation  s/p Cardiac arrest    Improved renal indices. To continue current meds. Monitor BP trend. Titrate BP meds as needed.    Monitor blood sugar levels. Insulin coverage as needed. Avoid nephrotoxic meds as possible. Podiatry follow up.   Will follow electrolytes and renal function trend.

## 2024-07-05 NOTE — PROGRESS NOTE ADULT - ASSESSMENT
54 YO M with past medical history of AFib (on Eliquis), indwelling Aguilera, cerebral aneurysm, CAD (s/p stents), CVA, T2DM, HTN, OM (s/p debridement), PE, perforated gastric ulcer s/p omentopexy 2019 with Dr. Mejia, transferred from [facility] for difficulty in breathing s/p cardiac arrest    CAD, Afib, s/p Cardiac Arrest, Mod-Severe MR, HTN  - s/p cardiac arrest x2 w/ AHRF.  s/p intubation with successful extubation.   - Still tolerating RA with no evidence of volume overload    - Trops + but no sig trend. No clear evidence of acute ischemia  - Continue ASA and statin      - Known with chronic AFib   - Rate controlled per flow sheets  - Continue long-acting Cardizem and Metoprolol with more lenient parameter  - Continue Eliquis    - TTE normal EF and mod-severe MR  - No sign of volume overload on exam   - lasix held for OBI, fu am labs      - Plan for debridement of Rt heel wound today  7/5  - Pt has no active ischemia, decompensated heart failure, unstable arrythmia, or severe stenotic valvular disease. Patient is optimized from cardiovascular standpoint to proceed with planned procedure with routine hemodynamic monitoring.     - Monitor and replete lytes, keep K>4, Mg>2.  - Will continue to follow.

## 2024-07-05 NOTE — BRIEF OPERATIVE NOTE - NSICDXBRIEFPOSTOP_GEN_ALL_CORE_FT
POST-OP DIAGNOSIS:  Skin ulcer, stage 3 05-Jul-2024 20:30:14  Cristi Mcbride  Osteomyelitis of foot 05-Jul-2024 20:30:29  Cristi Mcbride

## 2024-07-05 NOTE — PROGRESS NOTE ADULT - SUBJECTIVE AND OBJECTIVE BOX
Date of Service 07-05-24 @ 20:01    Patient is a 54y old  Male who presents with a chief complaint of Acute respiratory failure with hypoxia (05 Jul 2024 14:36)      INTERVAL /OVERNIGHT EVENTS: waiting for debridement    MEDICATIONS  (STANDING):    MEDICATIONS  (PRN):      Allergies    ertapenem (Blisters; Rash)  fish (Hives)    Intolerances        REVIEW OF SYSTEMS:  CONSTITUTIONAL: No fever, weight loss, or fatigue  EYES: No eye pain, visual disturbances, or discharge  ENMT:  No difficulty hearing, tinnitus, vertigo; No sinus or throat pain  NECK: No pain or stiffness  RESPIRATORY: No cough, wheezing, chills or hemoptysis; No shortness of breath  CARDIOVASCULAR: No chest pain, palpitations, dizziness, or leg swelling  GASTROINTESTINAL: No abdominal or epigastric pain. No nausea, vomiting, or hematemesis; No diarrhea or constipation. No melena or hematochezia.  GENITOURINARY: No dysuria, frequency, hematuria, or incontinence  NEUROLOGICAL: No headaches, memory loss, loss of strength, numbness, or tremors  SKIN: +, rashes  LYMPH NODES: No enlarged glands  ENDOCRINE: No heat or cold intolerance; No hair loss; No polydipsia or polyuria  MUSCULOSKELETAL: No joint pain or swelling; No muscle, back, or extremity pain  PSYCHIATRIC: No depression, anxiety, mood swings, or difficulty sleeping  HEME/LYMPH: No easy bruising, or bleeding gums  ALLERGY AND IMMUNOLOGIC: No hives or eczema    Vital Signs Last 24 Hrs  T(C): 36.9 (05 Jul 2024 18:07), Max: 37 (05 Jul 2024 11:48)  T(F): 98.4 (05 Jul 2024 18:07), Max: 98.6 (05 Jul 2024 11:48)  HR: 90 (05 Jul 2024 18:07) (51 - 90)  BP: 104/70 (05 Jul 2024 18:07) (99/63 - 114/80)  BP(mean): --  RR: 15 (05 Jul 2024 18:07) (15 - 19)  SpO2: 95% (05 Jul 2024 18:07) (91% - 95%)    Parameters below as of 05 Jul 2024 04:40  Patient On (Oxygen Delivery Method): room air        PHYSICAL EXAM:  GENERAL: NAD, well-groomed, well-developed  HEAD:  Atraumatic, Normocephalic  EYES: EOMI, PERRLA, conjunctiva and sclera clear  ENMT: No tonsillar erythema, exudates, or enlargement; Moist mucous membranes, Good dentition, No lesions  NECK: Supple, No JVD, Normal thyroid  NERVOUS SYSTEM:  Alert & Oriented X3, Good concentration; Motor Strength 5/5 B/L upper and lower extremities; DTRs 2+ intact and symmetric  CHEST/LUNG: Clear to auscultation bilaterally; No rales, rhonchi, wheezing, or rubs  HEART: Regular rate and rhythm; No murmurs, rubs, or gallops  ABDOMEN: Soft, Nontender, Nondistended; Bowel sounds present  EXTREMITIES:  2+ Peripheral Pulses, No clubbing, cyanosis, or edema  LYMPH: No lymphadenopathy noted  SKIN: + rashes or lesions    LABS:    05 Jul 2024 06:15    140    |  104    |  18     ----------------------------<  202    3.8     |  27     |  1.20     Ca    9.1        05 Jul 2024 06:15        Urinalysis Basic - ( 05 Jul 2024 06:15 )    Color: x / Appearance: x / SG: x / pH: x  Gluc: 202 mg/dL / Ketone: x  / Bili: x / Urobili: x   Blood: x / Protein: x / Nitrite: x   Leuk Esterase: x / RBC: x / WBC x   Sq Epi: x / Non Sq Epi: x / Bacteria: x      CAPILLARY BLOOD GLUCOSE      POCT Blood Glucose.: 210 mg/dL (05 Jul 2024 17:22)  POCT Blood Glucose.: 268 mg/dL (05 Jul 2024 11:43)  POCT Blood Glucose.: 239 mg/dL (05 Jul 2024 08:21)  POCT Blood Glucose.: 162 mg/dL (04 Jul 2024 22:12)      RADIOLOGY & ADDITIONAL TESTS:    Notes Reviewed:  [x ] YES  [ ] NO    Care Discussed with Consultants/Other Providers [x ] YES  [ ] NO

## 2024-07-06 LAB
ALBUMIN SERPL ELPH-MCNC: 2.4 G/DL — LOW (ref 3.3–5)
ALP SERPL-CCNC: 118 U/L — SIGNIFICANT CHANGE UP (ref 40–120)
ALT FLD-CCNC: 20 U/L — SIGNIFICANT CHANGE UP (ref 12–78)
ANION GAP SERPL CALC-SCNC: 9 MMOL/L — SIGNIFICANT CHANGE UP (ref 5–17)
AST SERPL-CCNC: 10 U/L — LOW (ref 15–37)
BILIRUB SERPL-MCNC: 0.4 MG/DL — SIGNIFICANT CHANGE UP (ref 0.2–1.2)
BUN SERPL-MCNC: 24 MG/DL — HIGH (ref 7–23)
CALCIUM SERPL-MCNC: 8.8 MG/DL — SIGNIFICANT CHANGE UP (ref 8.5–10.1)
CHLORIDE SERPL-SCNC: 101 MMOL/L — SIGNIFICANT CHANGE UP (ref 96–108)
CO2 SERPL-SCNC: 25 MMOL/L — SIGNIFICANT CHANGE UP (ref 22–31)
CREAT SERPL-MCNC: 1.5 MG/DL — HIGH (ref 0.5–1.3)
EGFR: 54 ML/MIN/1.73M2 — LOW
GLUCOSE BLDC GLUCOMTR-MCNC: 307 MG/DL — HIGH (ref 70–99)
GLUCOSE BLDC GLUCOMTR-MCNC: 345 MG/DL — HIGH (ref 70–99)
GLUCOSE SERPL-MCNC: 374 MG/DL — HIGH (ref 70–99)
HCT VFR BLD CALC: 31.2 % — LOW (ref 39–50)
HGB BLD-MCNC: 10.1 G/DL — LOW (ref 13–17)
MCHC RBC-ENTMCNC: 30.1 PG — SIGNIFICANT CHANGE UP (ref 27–34)
MCHC RBC-ENTMCNC: 32.4 GM/DL — SIGNIFICANT CHANGE UP (ref 32–36)
MCV RBC AUTO: 92.9 FL — SIGNIFICANT CHANGE UP (ref 80–100)
NRBC # BLD: 0 /100 WBCS — SIGNIFICANT CHANGE UP (ref 0–0)
PLATELET # BLD AUTO: 306 K/UL — SIGNIFICANT CHANGE UP (ref 150–400)
POTASSIUM SERPL-MCNC: 5.1 MMOL/L — SIGNIFICANT CHANGE UP (ref 3.5–5.3)
POTASSIUM SERPL-SCNC: 5.1 MMOL/L — SIGNIFICANT CHANGE UP (ref 3.5–5.3)
PROT SERPL-MCNC: 5.8 G/DL — LOW (ref 6–8.3)
RBC # BLD: 3.36 M/UL — LOW (ref 4.2–5.8)
RBC # FLD: 14.5 % — SIGNIFICANT CHANGE UP (ref 10.3–14.5)
SODIUM SERPL-SCNC: 135 MMOL/L — SIGNIFICANT CHANGE UP (ref 135–145)
WBC # BLD: 10.42 K/UL — SIGNIFICANT CHANGE UP (ref 3.8–10.5)
WBC # FLD AUTO: 10.42 K/UL — SIGNIFICANT CHANGE UP (ref 3.8–10.5)

## 2024-07-06 PROCEDURE — 99232 SBSQ HOSP IP/OBS MODERATE 35: CPT

## 2024-07-06 RX ORDER — MORPHINE SULFATE 100 MG/1
2 TABLET, EXTENDED RELEASE ORAL EVERY 6 HOURS
Refills: 0 | Status: DISCONTINUED | OUTPATIENT
Start: 2024-07-06 | End: 2024-07-08

## 2024-07-06 RX ORDER — OXYCODONE HYDROCHLORIDE 100 MG/5ML
10 SOLUTION ORAL EVERY 6 HOURS
Refills: 0 | Status: DISCONTINUED | OUTPATIENT
Start: 2024-07-06 | End: 2024-07-08

## 2024-07-06 RX ORDER — SODIUM CHLORIDE 0.65 %
1 AEROSOL, SPRAY (ML) NASAL
Qty: 0 | Refills: 0 | DISCHARGE

## 2024-07-06 RX ORDER — INSULIN LISPRO 100 [IU]/ML
0 INJECTION, SOLUTION SUBCUTANEOUS
Refills: 0 | DISCHARGE

## 2024-07-06 RX ORDER — CALAMINE
1 LOTION (ML) TOPICAL
Qty: 0 | Refills: 0 | DISCHARGE
Start: 2024-07-06

## 2024-07-06 RX ORDER — ACETAMINOPHEN 325 MG
2 TABLET ORAL
Qty: 0 | Refills: 0 | DISCHARGE
Start: 2024-07-06

## 2024-07-06 RX ORDER — ERTAPENEM SODIUM 1 G/1
1 INJECTION, POWDER, LYOPHILIZED, FOR SOLUTION INTRAMUSCULAR; INTRAVENOUS
Refills: 0 | DISCHARGE

## 2024-07-06 RX ORDER — SILVER SULFADIAZINE 10 MG/G
1 CREAM TOPICAL
Refills: 0 | DISCHARGE

## 2024-07-06 RX ORDER — PREDNISONE 10 MG/1
1 TABLET ORAL
Qty: 0 | Refills: 0 | DISCHARGE
Start: 2024-07-06

## 2024-07-06 RX ORDER — METOCLOPRAMIDE 5 MG/5ML
1 SOLUTION ORAL
Refills: 0 | DISCHARGE

## 2024-07-06 RX ORDER — TORSEMIDE 20 MG/1
1 TABLET ORAL
Refills: 0 | DISCHARGE

## 2024-07-06 RX ORDER — PHENAZOPYRIDINE HCL 200 MG/1
1 TABLET ORAL
Refills: 0 | DISCHARGE

## 2024-07-06 RX ADMIN — Medication 5 MILLIGRAM(S): at 22:45

## 2024-07-06 RX ADMIN — METFORMIN HYDROCHLORIDE 1000 MILLIGRAM(S): 850 TABLET, FILM COATED ORAL at 08:52

## 2024-07-06 RX ADMIN — OXYCODONE HYDROCHLORIDE 10 MILLIGRAM(S): 100 SOLUTION ORAL at 00:52

## 2024-07-06 RX ADMIN — Medication 100 MILLIGRAM(S): at 18:28

## 2024-07-06 RX ADMIN — PREDNISONE 10 MILLIGRAM(S): 10 TABLET ORAL at 06:28

## 2024-07-06 RX ADMIN — Medication 25 MILLIGRAM(S): at 22:45

## 2024-07-06 RX ADMIN — DILTIAZEM HYDROCHLORIDE 360 MILLIGRAM(S): 240 CAPSULE, EXTENDED RELEASE ORAL at 06:27

## 2024-07-06 RX ADMIN — APIXABAN 5 MILLIGRAM(S): 5 TABLET, FILM COATED ORAL at 18:29

## 2024-07-06 RX ADMIN — INSULIN LISPRO 2: 100 INJECTION, SOLUTION SUBCUTANEOUS at 22:43

## 2024-07-06 RX ADMIN — ATORVASTATIN CALCIUM 40 MILLIGRAM(S): 20 TABLET, FILM COATED ORAL at 22:45

## 2024-07-06 RX ADMIN — OXYCODONE HYDROCHLORIDE 10 MILLIGRAM(S): 100 SOLUTION ORAL at 07:40

## 2024-07-06 RX ADMIN — POTASSIUM CHLORIDE 10 MILLIEQUIVALENT(S): 600 TABLET, FILM COATED, EXTENDED RELEASE ORAL at 18:28

## 2024-07-06 RX ADMIN — VANCOMYCIN HYDROCHLORIDE 166.67 MILLIGRAM(S): KIT at 06:27

## 2024-07-06 RX ADMIN — METFORMIN HYDROCHLORIDE 1000 MILLIGRAM(S): 850 TABLET, FILM COATED ORAL at 18:31

## 2024-07-06 RX ADMIN — PANTOPRAZOLE SODIUM 40 MILLIGRAM(S): 40 INJECTION, POWDER, FOR SOLUTION INTRAVENOUS at 18:28

## 2024-07-06 RX ADMIN — Medication 1 TABLET(S): at 12:06

## 2024-07-06 RX ADMIN — NALOXEGOL OXALATE 25 MILLIGRAM(S): 25 TABLET, FILM COATED ORAL at 12:06

## 2024-07-06 RX ADMIN — Medication 100 MILLIGRAM(S): at 06:29

## 2024-07-06 RX ADMIN — OXYCODONE HYDROCHLORIDE 10 MILLIGRAM(S): 100 SOLUTION ORAL at 06:54

## 2024-07-06 RX ADMIN — Medication 1000 UNIT(S): at 12:06

## 2024-07-06 RX ADMIN — Medication 25 MILLIGRAM(S): at 06:29

## 2024-07-06 RX ADMIN — OXYCODONE HYDROCHLORIDE 10 MILLIGRAM(S): 100 SOLUTION ORAL at 22:45

## 2024-07-06 RX ADMIN — APIXABAN 5 MILLIGRAM(S): 5 TABLET, FILM COATED ORAL at 06:28

## 2024-07-06 RX ADMIN — Medication 100 MILLIGRAM(S): at 06:28

## 2024-07-06 RX ADMIN — POLYETHYLENE GLYCOL 3350 17 GRAM(S): 1 POWDER ORAL at 12:05

## 2024-07-06 RX ADMIN — HYDROXYZINE PAMOATE 25 MILLIGRAM(S): 50 CAPSULE ORAL at 18:29

## 2024-07-06 RX ADMIN — POTASSIUM CHLORIDE 10 MILLIEQUIVALENT(S): 600 TABLET, FILM COATED, EXTENDED RELEASE ORAL at 06:28

## 2024-07-06 RX ADMIN — TIOTROPIUM BROMIDE AND OLODATEROL 2 PUFF(S): 3.124; 2.736 SPRAY, METERED RESPIRATORY (INHALATION) at 06:55

## 2024-07-06 RX ADMIN — OXYCODONE HYDROCHLORIDE 10 MILLIGRAM(S): 100 SOLUTION ORAL at 22:44

## 2024-07-06 RX ADMIN — Medication 1 GRAM(S): at 06:27

## 2024-07-06 RX ADMIN — ASPIRIN 81 MILLIGRAM(S): 325 TABLET, FILM COATED ORAL at 12:07

## 2024-07-06 RX ADMIN — OXYCODONE HYDROCHLORIDE 10 MILLIGRAM(S): 100 SOLUTION ORAL at 01:52

## 2024-07-06 RX ADMIN — MORPHINE SULFATE 2 MILLIGRAM(S): 100 TABLET, EXTENDED RELEASE ORAL at 13:30

## 2024-07-06 RX ADMIN — Medication 1 GRAM(S): at 18:29

## 2024-07-06 RX ADMIN — MORPHINE SULFATE 2 MILLIGRAM(S): 100 TABLET, EXTENDED RELEASE ORAL at 12:30

## 2024-07-06 RX ADMIN — Medication 500 MILLIGRAM(S): at 12:06

## 2024-07-06 RX ADMIN — Medication 2 TABLET(S): at 22:45

## 2024-07-06 RX ADMIN — Medication 25 MILLIGRAM(S): at 12:30

## 2024-07-06 RX ADMIN — PANTOPRAZOLE SODIUM 40 MILLIGRAM(S): 40 INJECTION, POWDER, FOR SOLUTION INTRAVENOUS at 06:27

## 2024-07-06 RX ADMIN — HYDROXYZINE PAMOATE 25 MILLIGRAM(S): 50 CAPSULE ORAL at 06:27

## 2024-07-06 NOTE — PROGRESS NOTE ADULT - SUBJECTIVE AND OBJECTIVE BOX
[facility] -- Brittany Lutz Pannella, Patel, Savella Goodger, Cohen  Office # [phone]          Follow Up:  s/p Cardiac Arrest, Afib with SVR, Diastolic HF    Subjective/Observations:     Seen bedside, sleeping in NAD remains on room air     PAST MEDICAL & SURGICAL HISTORY:  Diabetes      Diabetes mellitus with no complication      Afib      Hypertension      BPH (benign prostatic hyperplasia)      Perforated gastric ulcer  s/p emergent ex-lap omentopexy and plication 2019      Pulmonary embolism      History of non-ST elevation myocardial infarction (NSTEMI)      Osteomyelitis  s/p debridement      CAD S/P percutaneous coronary angioplasty      Cerebrovascular accident      H/O abdominal surgery      Perforated gastric ulcer      Traumatic amputation of left foot, initial encounter          MEDICATIONS  (STANDING):  apixaban 5 milliGRAM(s) Oral two times a day  ascorbic acid 500 milliGRAM(s) Oral daily  aspirin enteric coated 81 milliGRAM(s) Oral daily  atorvastatin 40 milliGRAM(s) Oral at bedtime  benzocaine 20% Spray 1 Spray(s) Topical every 6 hours  calamine/zinc oxide Lotion 1 Application(s) Topical two times a day  cholecalciferol 1000 Unit(s) Oral daily  dextrose 10% Bolus 125 milliLiter(s) IV Bolus once  dextrose 5%. 1000 milliLiter(s) (50 mL/Hr) IV Continuous <Continuous>  dextrose 5%. 1000 milliLiter(s) (100 mL/Hr) IV Continuous <Continuous>  dextrose 50% Injectable 25 Gram(s) IV Push once  dextrose 50% Injectable 12.5 Gram(s) IV Push once  dextrose Oral Gel 15 Gram(s) Oral once  diltiazem    milliGRAM(s) Oral daily  gabapentin 100 milliGRAM(s) Oral every 12 hours  glucagon  Injectable 1 milliGRAM(s) IntraMuscular once  hydrOXYzine hydrochloride 25 milliGRAM(s) Oral two times a day  insulin lispro (ADMELOG) corrective regimen sliding scale   SubCutaneous at bedtime  melatonin 5 milliGRAM(s) Oral at bedtime  metFORMIN 1000 milliGRAM(s) Oral two times a day  metoprolol succinate  milliGRAM(s) Oral two times a day  multivitamin 1 Tablet(s) Oral daily  naloxegol 25 milliGRAM(s) Oral daily  pantoprazole    Tablet 40 milliGRAM(s) Oral two times a day  polyethylene glycol 3350 17 Gram(s) Oral daily  potassium chloride    Tablet ER 10 milliEquivalent(s) Oral two times a day  predniSONE   Tablet 10 milliGRAM(s) Oral daily  senna 2 Tablet(s) Oral at bedtime  sucralfate 1 Gram(s) Oral two times a day  tiotropium 2.5 MICROgram(s)/olodaterol 2.5 MICROgram(s) (STIOLTO) Inhaler 2 Puff(s) Inhalation daily  vancomycin  IVPB 1250 milliGRAM(s) IV Intermittent every 24 hours    MEDICATIONS  (PRN):  acetaminophen     Tablet .. 650 milliGRAM(s) Oral every 6 hours PRN Mild Pain (1 - 3)  diphenhydrAMINE 25 milliGRAM(s) Oral every 4 hours PRN Rash and/or Itching  oxyCODONE    IR 10 milliGRAM(s) Oral every 6 hours PRN Severe Pain (7 - 10)  sodium chloride 0.65% Nasal 1 Spray(s) Both Nostrils three times a day PRN Nasal Congestion  sodium chloride 0.9% lock flush 10 milliLiter(s) IV Push every 1 hour PRN Pre/post blood products, medications, blood draw, and to maintain line patency      Allergies    ertapenem (Blisters; Rash)  fish (Hives)    Intolerances        Vital Signs Last 24 Hrs  T(C): 36.4 (2024 04:00), Max: 37.2 (2024 20:25)  T(F): 97.5 (2024 04:00), Max: 99 (2024 20:25)  HR: 95 (2024 04:00) (51 - 118)  BP: 106/63 (2024 04:00) (99/63 - 120/62)  BP(mean): --  RR: 17 (2024 04:00) (12 - 19)  SpO2: 98% (2024 04:00) (92% - 100%)    Parameters below as of 2024 04:00  Patient On (Oxygen Delivery Method): room air        I&O's Summary    2024 07:01  -  2024 07:00  --------------------------------------------------------  IN: 390 mL / OUT: 1500 mL / NET: -1110 mL          PHYSICAL EXAM:  TELE: Not on telemetry   PHYSICAL EXAM:  Constitutional: NAD, awake and alert  HEENT: Moist Mucous Membranes, Anicteric  Pulmonary: Non-labored, breath sounds are clear bilaterally, Diminished at bases No wheezing, rales or rhonchi  Cardiovascular: Regular, S1 and S2, murmur   Gastrointestinal:  soft, nontender, nondistended   Lymph: No peripheral edema. No lymphadenopathy.   Skin: No visible rashes  Right foot DSD intact.   Psych:  Mood & affect appropriate  LABS: All Labs Reviewed:                        11.   13.28 )-----------( 380      ( 2024 12:02 )             36.7                         11.7   13.33 )-----------( 382      ( 2024 11:10 )             36.6     2024 06:15    140    |  104    |  18     ----------------------------<  202    3.8     |  27     |  1.20   2024 12:02    138    |  105    |  16     ----------------------------<  205    4.3     |  25     |  1.60   2024 11:10    139    |  104    |  17     ----------------------------<  238    4.3     |  25     |  1.40     Ca    9.1        2024 06:15  Ca    9.0        2024 12:02  Ca    8.8        2024 11:10    TPro  6.0    /  Alb  2.5    /  TBili  0.4    /  DBili  x      /  AST  12     /  ALT  20     /  AlkPhos  111    2024 12:02             EC Lead ECG:   Ventricular Rate 126 BPM    QRS Duration 76 ms    Q-T Interval 302 ms    QTC Calculation(Bazett) 437 ms    R Axis -12 degrees    T Axis 103 degrees    Diagnosis Line Atrial fibrillation with rapid ventricular response  Low voltage QRS  Nonspecific ST and T wave abnormality  Abnormal ECG  When compared with ECG of 2024 11:03,  Questionable change in QRS axis  Nonspecific T wave abnormality now evident in Inferior leads  Nonspecific T wave abnormality, worse in Anterolateral leads  Confirmed by Juventino Soto MD ([id number]) on 2024 12:52:09 PM (24 @ 11:37)      TRANSTHORACIC ECHOCARDIOGRAM REPORT  ________________________________________________________________________________                                      _______       Pt. Name:       SARAH MORRISON Study Date:    2024  MRN:            [id number]         YOB: 1970  Accession #:    [id number]        Age:           53 years  Account#:       [id number]       Gender:        M  Visit ID#  Heart Rate:                      Height:        72.05 in (183.00 cm)  Rhythm:          Weight:        222.66 lb (101.00 kg)  Blood Pressure: 88/53 mmHg       BSA/BMI:       2.23 m² / 30.16 kg/m²  ________________________________________________________________________________________  Referring Physician:    [id number] Hill Brizuela  Interpreting Physician: Rahel Boss MD  Primary Sonographer:    Mounika FELDMAN    CPT:               ECHO TTE WO CON COMP W DOPP - 91899.m  Indication(s):     Heart failure, unspecified - I50.9  Procedure:         Transthoracic echocardiogram with 2-D, M-mode and complete                     spectral and color flow Doppler.  Ordering Location: [facility]  Admission Status:  Inpatient    _______________________________________________________________________________________     CONCLUSIONS:      1. Left ventricular cavity is normal in size. Left ventricular systolic function is normal with an ejection fraction visually estimated at 55 to 60 %. There are no regional wall motion abnormalities seen.   2. Moderate to severe left ventricular hypertrophy.   3. Normal right ventricular cavity size, with normal wall thickness, and normal systolic function.   4. The left atrium is severely dilated.   5. The right atrium is severely dilated.   6. Trileaflet aortic valve with normal systolic excursion. There is focal calcification of the aortic valve leaflets.   7. Mild to moderate mitral regurgitation.   8. There is moderate calcification of the mitral valve annulus.   9. Structurally normal tricuspid valve with normal leaflet excursion.Mild tricuspid regurgitation.  10. The inferior vena cava is normal in size measuring 1.70 cm in diameter, (normal <2.1cm) with normal inspiratory collapse (normal >50%) consistent with normal right atrial pressure (~3, range 0-5mmHg).  11. Estimatedpulmonary artery systolic pressure is 43 mmHg.    ________________________________________________________________________________________  FINDINGS:     Left Ventricle:  The left ventricular cavity is normal in size. Left ventricular systolic function is normal with an ejection fraction visually estimated at 55 to 60%. There are no regional wall motion abnormalities seen. There is normal left ventricular diastolic function, with normal filling pressure. Moderate to severe left ventricular hypertrophy.     Right Ventricle:  The right ventricular cavity is normal in size, with normal wall thickness and normal systolic function.     Left Atrium:  The left atrium is severely dilated.     Right Atrium:  The right atrium is severely dilated.     Aortic Valve:  The aortic valve appears trileaflet with normal systolic excursion. There is focal calcification of the aortic valve leaflets. There is no aortic valve stenosis. There is no evidence of aortic regurgitation.     Mitral Valve:  There is moderate calcification of the mitral valve annulus. There is mild to moderate mitral regurgitation.     Tricuspid Valve:  Structurally normal tricuspid valve with normal leaflet excursion. There is mild tricuspid regurgitation. Estimated pulmonary artery systolic pressure is 43 mmHg.     Pulmonic Valve:  The pulmonic valve was not well visualized.     Systemic Veins:  The inferior vena cava is normal in size measuring 1.70 cm in diameter, (normal <2.1cm) with normal inspiratory collapse (normal >50%) consistent with normal right atrial pressure (~3, range 0-5mmHg).  ____________________________________________________________________  QUANTITATIVE DATA:  Left Ventricle Measurements: (Indexed to BSA)     IVSd (2D):   1.7 cm  LVPWd (2D):  1.4 cm  LVIDd (2D):  4.3 cm  LVIDs (2D):  3.0 cm  LV Mass:     281 g  125.9 g/m²  Visualized LV EF%: 55 to 60%     MV E Vmax:    1.07 m/s  MV A Vmax:    0.00 m/s  e' lateral:   9.68 cm/s  e' medial:    10.90 cm/s  E/e' lateral: 11.05  E/e' medial:  9.82  E/e' Average: 10.40  MV DT:        180 msec    Aorta Measurements: (Normal range) (Indexed to BSA)     Sinuses of Valsalva: 3.40 cm (3.1 - 3.7 cm)       Left Atrium Measurements: (Indexed to BSA)  LA Diam 2D: 4.00 cm       LVOT / RVOT/ Qp/Qs Data: (Indexed to BSA)  LVOT Diameter: 2.10 cm  LVOT Area:     3.46 cm²    Mitral Valve Measurements:     MV E Vmax: 1.1 m/s  MV A Vmax: 0.0 m/s       Tricuspid Valve Measurements:     TR Vmax:          3.2 m/s  TR Peak Gradient: 40.4 mmHg  RA Pressure:      3 mmHg  PASP:             43 mmHg    ________________________________________________________________________________________  Electronically signed on 2024 at 12:08:59 PM by Rahel Boss MD         *** Final ***      Radiology:

## 2024-07-06 NOTE — PHARMACOTHERAPY INTERVENTION NOTE - COMMENTS
Patient is a 54 y/o male being treated for R heel wound/osteomyelitis on vancomycin 1250mg q24h, current  hr*ug/mL. Will f/u trough 7/8 0500.     Vancomycin dosing by pharmacy:  1. Indication for the vancomycin: R heel wound/osteomyelitis  2. Requesting Provider: Dr. Whitten  3. Current plan and dosing regimen: 1250 mg Q24H  4. Day of therapy: 32  5. Cultures: Tissue Cx 5/26/24: E faecalis + MRSA. Tissue Cx 6/4/24: ESBL E coli + E faecalis. R heel surgical swab 6/14/24: MRSA + citrobacter freundii + pseudomonas stutzeri.  6. Target trough or AUC/BAYLEE: 400-600 hr*ug/mL  7. Day and time level is due: 7/8 05:00  8. Previous levels: 6/4 (19:31): 22, 6/5 (05:47): 14, 6/6 (20:20): 14, 6/9 (04:58): 14.4, 6/11 (04:30): 15.7, 6/13 (05:54): 17.3, 6/14 (01:25): 15.2, 6/14 (04:30): 16.1, 6/15 (5:40): 17.5, 6/16 (5:38): 15.3, 6/17 (05:52): 13.2, 6/18 (05:50): 11.8, 6/19 (06:15): 21.3, 6/20 (08:52): 21.6, 6/21 (09:30): 37.5, 06/22 (0945): 11.1, 06/23 (09:30): 15.9, 06/24 (08:00): 19.6, 06/27 (21:47): 18.2, 6/29 (10:05): 36.4 (taken during infusion), 6/30 (07:10): 20.3 7/3 0620: 14.2, 7/5 0615: 14.5  9. Expected 24-hour AUC: 490 hr*ug/m

## 2024-07-06 NOTE — SOCIAL WORK PROGRESS NOTE - NSSWPROGRESSNOTE_GEN_ALL_CORE
I spoke with MD who said patient not ready today, needs at least 24 hours post sx. I sent message via [employer] to [facility] to see if they can take on Sunday if cleared and they said they can accept on Sunday to call cell#[phone]. Social work to follow.

## 2024-07-06 NOTE — PROGRESS NOTE ADULT - ASSESSMENT
52 YO M with past medical history of AFib (on Eliquis), indwelling Aguilera, cerebral aneurysm, CAD (s/p stents), CVA, T2DM, HTN, OM (s/p debridement), PE, perforated gastric ulcer s/p omentopexy 2019 with Dr. Mejia, transferred from [facility] for difficulty in breathing s/p cardiac arrest    CAD, Afib, s/p Cardiac Arrest, Mod-Severe MR, HTN  - s/p cardiac arrest x2 w/ AHRF.  s/p intubation with successful extubation.   - Still tolerating RA with no evidence of volume overload    - Trops + but no sig trend. No clear evidence of acute ischemia  - Continue ASA and statin      - Known with chronic AFib   - Rate controlled per flow sheets  - Continue long-acting Cardizem and Metoprolol with more lenient parameter  - Continue Eliquis    - TTE normal EF and mod-severe MR  - No sign of volume overload on exam   - lasix held for OBI initially . resolved 7/5 fu AM labs would resume po diuretics    fu podiatry recs   Monitor and replete electrolytes. Keep K>4.0 and Mg>2.0.

## 2024-07-06 NOTE — PROGRESS NOTE ADULT - SUBJECTIVE AND OBJECTIVE BOX
Date of Service 07-06-24 @ 14:32    Patient is a 54y old  Male who presents with a chief complaint of Acute respiratory failure with hypoxia (06 Jul 2024 09:10)      INTERVAL /OVERNIGHT EVENTS: c/o severe pain at surgical site    MEDICATIONS  (STANDING):  apixaban 5 milliGRAM(s) Oral two times a day  ascorbic acid 500 milliGRAM(s) Oral daily  aspirin enteric coated 81 milliGRAM(s) Oral daily  atorvastatin 40 milliGRAM(s) Oral at bedtime  benzocaine 20% Spray 1 Spray(s) Topical every 6 hours  calamine/zinc oxide Lotion 1 Application(s) Topical two times a day  cholecalciferol 1000 Unit(s) Oral daily  dextrose 10% Bolus 125 milliLiter(s) IV Bolus once  dextrose 5%. 1000 milliLiter(s) (50 mL/Hr) IV Continuous <Continuous>  dextrose 5%. 1000 milliLiter(s) (100 mL/Hr) IV Continuous <Continuous>  dextrose 50% Injectable 25 Gram(s) IV Push once  dextrose 50% Injectable 12.5 Gram(s) IV Push once  dextrose Oral Gel 15 Gram(s) Oral once  diltiazem    milliGRAM(s) Oral daily  gabapentin 100 milliGRAM(s) Oral every 12 hours  glucagon  Injectable 1 milliGRAM(s) IntraMuscular once  hydrOXYzine hydrochloride 25 milliGRAM(s) Oral two times a day  insulin lispro (ADMELOG) corrective regimen sliding scale   SubCutaneous at bedtime  melatonin 5 milliGRAM(s) Oral at bedtime  metFORMIN 1000 milliGRAM(s) Oral two times a day  metoprolol succinate  milliGRAM(s) Oral two times a day  multivitamin 1 Tablet(s) Oral daily  naloxegol 25 milliGRAM(s) Oral daily  pantoprazole    Tablet 40 milliGRAM(s) Oral two times a day  polyethylene glycol 3350 17 Gram(s) Oral daily  potassium chloride    Tablet ER 10 milliEquivalent(s) Oral two times a day  predniSONE   Tablet 10 milliGRAM(s) Oral daily  senna 2 Tablet(s) Oral at bedtime  sucralfate 1 Gram(s) Oral two times a day  tiotropium 2.5 MICROgram(s)/olodaterol 2.5 MICROgram(s) (STIOLTO) Inhaler 2 Puff(s) Inhalation daily  vancomycin  IVPB 1250 milliGRAM(s) IV Intermittent every 24 hours    MEDICATIONS  (PRN):  acetaminophen     Tablet .. 650 milliGRAM(s) Oral every 6 hours PRN Mild Pain (1 - 3)  diphenhydrAMINE 25 milliGRAM(s) Oral every 4 hours PRN Rash and/or Itching  morphine  - Injectable 2 milliGRAM(s) IV Push every 6 hours PRN Severe Pain (7 - 10)  oxyCODONE    IR 10 milliGRAM(s) Oral every 6 hours PRN Moderate Pain (4 - 6)  sodium chloride 0.65% Nasal 1 Spray(s) Both Nostrils three times a day PRN Nasal Congestion  sodium chloride 0.9% lock flush 10 milliLiter(s) IV Push every 1 hour PRN Pre/post blood products, medications, blood draw, and to maintain line patency      Allergies    ertapenem (Blisters; Rash)  fish (Hives)    Intolerances        REVIEW OF SYSTEMS:  CONSTITUTIONAL: No fever, weight loss, or fatigue  EYES: No eye pain, visual disturbances, or discharge  ENMT:  No difficulty hearing, tinnitus, vertigo; No sinus or throat pain  NECK: No pain or stiffness  RESPIRATORY: No cough, wheezing, chills or hemoptysis; No shortness of breath  CARDIOVASCULAR: No chest pain, palpitations, dizziness, or leg swelling  GASTROINTESTINAL: No abdominal or epigastric pain. No nausea, vomiting, or hematemesis; No diarrhea or constipation. No melena or hematochezia.  GENITOURINARY: No dysuria, frequency, hematuria, or incontinence  NEUROLOGICAL: No headaches, memory loss, loss of strength, numbness, or tremors  SKIN: +, rashes  LYMPH NODES: No enlarged glands  ENDOCRINE: No heat or cold intolerance; No hair loss; No polydipsia or polyuria  MUSCULOSKELETAL: No joint pain or swelling; No muscle, back, or extremity pain  PSYCHIATRIC: No depression, anxiety, mood swings, or difficulty sleeping  HEME/LYMPH: No easy bruising, or bleeding gums  ALLERGY AND IMMUNOLOGIC: No hives or eczema    Vital Signs Last 24 Hrs  T(C): 36.4 (06 Jul 2024 12:38), Max: 37.2 (05 Jul 2024 20:25)  T(F): 97.5 (06 Jul 2024 12:38), Max: 99 (05 Jul 2024 20:25)  HR: 93 (06 Jul 2024 12:38) (80 - 118)  BP: 108/71 (06 Jul 2024 12:38) (99/69 - 120/62)  BP(mean): --  RR: 18 (06 Jul 2024 12:38) (12 - 19)  SpO2: 94% (06 Jul 2024 12:38) (93% - 100%)    Parameters below as of 06 Jul 2024 12:38  Patient On (Oxygen Delivery Method): room air        PHYSICAL EXAM:  GENERAL: NAD, well-groomed, well-developed  HEAD:  Atraumatic, Normocephalic  EYES: EOMI, PERRLA, conjunctiva and sclera clear  ENMT: No tonsillar erythema, exudates, or enlargement; Moist mucous membranes, Good dentition, No lesions  NECK: Supple, No JVD, Normal thyroid  NERVOUS SYSTEM:  Alert & Oriented X3, Good concentration; Motor Strength 5/5 B/L upper and lower extremities; DTRs 2+ intact and symmetric  CHEST/LUNG: Clear to auscultation bilaterally; No rales, rhonchi, wheezing, or rubs  HEART: Regular rate and rhythm; No murmurs, rubs, or gallops  ABDOMEN: Soft, Nontender, Nondistended; Bowel sounds present  EXTREMITIES:  right foot in surgical bandage  LYMPH: No lymphadenopathy noted  SKIN: + rashes or lesions    LABS:                        10.1   10.42 )-----------( 306      ( 06 Jul 2024 11:02 )             31.2     06 Jul 2024 11:02    135    |  101    |  24     ----------------------------<  374    5.1     |  25     |  1.50     Ca    8.8        06 Jul 2024 11:02    TPro  5.8    /  Alb  2.4    /  TBili  0.4    /  DBili  x      /  AST  10     /  ALT  20     /  AlkPhos  118    06 Jul 2024 11:02      Urinalysis Basic - ( 06 Jul 2024 11:02 )    Color: x / Appearance: x / SG: x / pH: x  Gluc: 374 mg/dL / Ketone: x  / Bili: x / Urobili: x   Blood: x / Protein: x / Nitrite: x   Leuk Esterase: x / RBC: x / WBC x   Sq Epi: x / Non Sq Epi: x / Bacteria: x      CAPILLARY BLOOD GLUCOSE      POCT Blood Glucose.: 182 mg/dL (05 Jul 2024 22:23)  POCT Blood Glucose.: 145 mg/dL (05 Jul 2024 20:28)  POCT Blood Glucose.: 210 mg/dL (05 Jul 2024 17:22)      RADIOLOGY & ADDITIONAL TESTS:    Notes Reviewed:  [x ] YES  [ ] NO    Care Discussed with Consultants/Other Providers [x ] YES  [ ] NO

## 2024-07-06 NOTE — PROGRESS NOTE ADULT - ASSESSMENT
Claude Villareal is a 54 YO M with past medical history of  AF on Eliquis, indwelling Aguilera, CAD s/p stents, CVA, DMT 2, Hypertension, osteomyelitis s/p debridement, PE, transferred from [facility] for difficulty in breathing.  His recent admission was for osteomyelitis and discharged on IV Vancomycin. On ED arrival he is unresponsive, apneic, no palpable pulse or blood pressure, EKG rhythm HR < 20/min W/O P waves. Patient intubated, Code ACLS activated, IV epinephrine, IV atropine administered, pulse became palpable, still Hypotensive, IV levophed administered. Admitted to ICU consulted,  Patient woke up and agitated try to pull out ETT,  IV propofol started,

## 2024-07-07 LAB
GLUCOSE BLDC GLUCOMTR-MCNC: 252 MG/DL — HIGH (ref 70–99)
GLUCOSE BLDC GLUCOMTR-MCNC: 306 MG/DL — HIGH (ref 70–99)
GLUCOSE BLDC GLUCOMTR-MCNC: 327 MG/DL — HIGH (ref 70–99)
GLUCOSE BLDC GLUCOMTR-MCNC: 350 MG/DL — HIGH (ref 70–99)
GLUCOSE BLDC GLUCOMTR-MCNC: 361 MG/DL — HIGH (ref 70–99)

## 2024-07-07 PROCEDURE — 99232 SBSQ HOSP IP/OBS MODERATE 35: CPT

## 2024-07-07 RX ORDER — INSULIN LISPRO 100 [IU]/ML
INJECTION, SOLUTION SUBCUTANEOUS
Refills: 0 | Status: DISCONTINUED | OUTPATIENT
Start: 2024-07-07 | End: 2024-07-08

## 2024-07-07 RX ORDER — INSULIN REGULAR, HUMAN 100/ML
6 VIAL (ML) INJECTION ONCE
Refills: 0 | Status: COMPLETED | OUTPATIENT
Start: 2024-07-07 | End: 2024-07-07

## 2024-07-07 RX ADMIN — METFORMIN HYDROCHLORIDE 1000 MILLIGRAM(S): 850 TABLET, FILM COATED ORAL at 17:47

## 2024-07-07 RX ADMIN — PREDNISONE 10 MILLIGRAM(S): 10 TABLET ORAL at 06:37

## 2024-07-07 RX ADMIN — PANTOPRAZOLE SODIUM 40 MILLIGRAM(S): 40 INJECTION, POWDER, FOR SOLUTION INTRAVENOUS at 17:48

## 2024-07-07 RX ADMIN — MORPHINE SULFATE 2 MILLIGRAM(S): 100 TABLET, EXTENDED RELEASE ORAL at 00:33

## 2024-07-07 RX ADMIN — Medication 100 MILLIGRAM(S): at 06:37

## 2024-07-07 RX ADMIN — OXYCODONE HYDROCHLORIDE 10 MILLIGRAM(S): 100 SOLUTION ORAL at 07:36

## 2024-07-07 RX ADMIN — Medication 1000 UNIT(S): at 12:49

## 2024-07-07 RX ADMIN — VANCOMYCIN HYDROCHLORIDE 166.67 MILLIGRAM(S): KIT at 06:35

## 2024-07-07 RX ADMIN — MORPHINE SULFATE 2 MILLIGRAM(S): 100 TABLET, EXTENDED RELEASE ORAL at 22:46

## 2024-07-07 RX ADMIN — Medication 1 GRAM(S): at 06:37

## 2024-07-07 RX ADMIN — ASPIRIN 81 MILLIGRAM(S): 325 TABLET, FILM COATED ORAL at 12:49

## 2024-07-07 RX ADMIN — INSULIN LISPRO 4: 100 INJECTION, SOLUTION SUBCUTANEOUS at 12:49

## 2024-07-07 RX ADMIN — INSULIN LISPRO 4: 100 INJECTION, SOLUTION SUBCUTANEOUS at 17:47

## 2024-07-07 RX ADMIN — Medication 100 MILLIGRAM(S): at 17:49

## 2024-07-07 RX ADMIN — TIOTROPIUM BROMIDE AND OLODATEROL 2 PUFF(S): 3.124; 2.736 SPRAY, METERED RESPIRATORY (INHALATION) at 06:38

## 2024-07-07 RX ADMIN — Medication 5 MILLIGRAM(S): at 21:51

## 2024-07-07 RX ADMIN — OXYCODONE HYDROCHLORIDE 10 MILLIGRAM(S): 100 SOLUTION ORAL at 06:36

## 2024-07-07 RX ADMIN — MORPHINE SULFATE 2 MILLIGRAM(S): 100 TABLET, EXTENDED RELEASE ORAL at 21:51

## 2024-07-07 RX ADMIN — Medication 25 MILLIGRAM(S): at 13:04

## 2024-07-07 RX ADMIN — DILTIAZEM HYDROCHLORIDE 360 MILLIGRAM(S): 240 CAPSULE, EXTENDED RELEASE ORAL at 06:36

## 2024-07-07 RX ADMIN — APIXABAN 5 MILLIGRAM(S): 5 TABLET, FILM COATED ORAL at 06:37

## 2024-07-07 RX ADMIN — HYDROXYZINE PAMOATE 25 MILLIGRAM(S): 50 CAPSULE ORAL at 06:37

## 2024-07-07 RX ADMIN — POLYETHYLENE GLYCOL 3350 17 GRAM(S): 1 POWDER ORAL at 12:49

## 2024-07-07 RX ADMIN — INSULIN LISPRO 1: 100 INJECTION, SOLUTION SUBCUTANEOUS at 21:51

## 2024-07-07 RX ADMIN — Medication 25 MILLIGRAM(S): at 06:36

## 2024-07-07 RX ADMIN — Medication 6 UNIT(S): at 10:05

## 2024-07-07 RX ADMIN — PANTOPRAZOLE SODIUM 40 MILLIGRAM(S): 40 INJECTION, POWDER, FOR SOLUTION INTRAVENOUS at 06:38

## 2024-07-07 RX ADMIN — ATORVASTATIN CALCIUM 40 MILLIGRAM(S): 20 TABLET, FILM COATED ORAL at 21:51

## 2024-07-07 RX ADMIN — Medication 1 TABLET(S): at 12:48

## 2024-07-07 RX ADMIN — MORPHINE SULFATE 2 MILLIGRAM(S): 100 TABLET, EXTENDED RELEASE ORAL at 13:05

## 2024-07-07 RX ADMIN — Medication 500 MILLIGRAM(S): at 12:49

## 2024-07-07 RX ADMIN — APIXABAN 5 MILLIGRAM(S): 5 TABLET, FILM COATED ORAL at 17:47

## 2024-07-07 RX ADMIN — Medication 2 TABLET(S): at 21:51

## 2024-07-07 RX ADMIN — POTASSIUM CHLORIDE 10 MILLIEQUIVALENT(S): 600 TABLET, FILM COATED, EXTENDED RELEASE ORAL at 17:48

## 2024-07-07 RX ADMIN — MORPHINE SULFATE 2 MILLIGRAM(S): 100 TABLET, EXTENDED RELEASE ORAL at 14:00

## 2024-07-07 RX ADMIN — Medication 1 GRAM(S): at 17:48

## 2024-07-07 RX ADMIN — POTASSIUM CHLORIDE 10 MILLIEQUIVALENT(S): 600 TABLET, FILM COATED, EXTENDED RELEASE ORAL at 06:38

## 2024-07-07 RX ADMIN — Medication 25 MILLIGRAM(S): at 21:50

## 2024-07-07 RX ADMIN — HYDROXYZINE PAMOATE 25 MILLIGRAM(S): 50 CAPSULE ORAL at 17:48

## 2024-07-07 RX ADMIN — MORPHINE SULFATE 2 MILLIGRAM(S): 100 TABLET, EXTENDED RELEASE ORAL at 00:48

## 2024-07-07 RX ADMIN — METFORMIN HYDROCHLORIDE 1000 MILLIGRAM(S): 850 TABLET, FILM COATED ORAL at 08:21

## 2024-07-07 NOTE — PROGRESS NOTE ADULT - SUBJECTIVE AND OBJECTIVE BOX
54y year old Male seen at [facility] D1 s/p right heel debridement. Patient relates no overnight events and states that they are doing well at this time.  Denies any fever, chills, nausea, vomiting, chest pain, shortness of breath, or calf pain at this time.    Allergies    ertapenem (Blisters; Rash)  fish (Hives)    Intolerances        MEDICATIONS  (STANDING):  apixaban 5 milliGRAM(s) Oral two times a day  ascorbic acid 500 milliGRAM(s) Oral daily  aspirin enteric coated 81 milliGRAM(s) Oral daily  atorvastatin 40 milliGRAM(s) Oral at bedtime  benzocaine 20% Spray 1 Spray(s) Topical every 6 hours  calamine/zinc oxide Lotion 1 Application(s) Topical two times a day  cholecalciferol 1000 Unit(s) Oral daily  dextrose 10% Bolus 125 milliLiter(s) IV Bolus once  dextrose 5%. 1000 milliLiter(s) (50 mL/Hr) IV Continuous <Continuous>  dextrose 5%. 1000 milliLiter(s) (100 mL/Hr) IV Continuous <Continuous>  dextrose 50% Injectable 25 Gram(s) IV Push once  dextrose 50% Injectable 12.5 Gram(s) IV Push once  dextrose Oral Gel 15 Gram(s) Oral once  diltiazem    milliGRAM(s) Oral daily  gabapentin 100 milliGRAM(s) Oral every 12 hours  glucagon  Injectable 1 milliGRAM(s) IntraMuscular once  hydrOXYzine hydrochloride 25 milliGRAM(s) Oral two times a day  insulin lispro (ADMELOG) corrective regimen sliding scale   SubCutaneous at bedtime  insulin lispro (ADMELOG) corrective regimen sliding scale   SubCutaneous three times a day before meals  melatonin 5 milliGRAM(s) Oral at bedtime  metFORMIN 1000 milliGRAM(s) Oral two times a day  metoprolol succinate  milliGRAM(s) Oral two times a day  multivitamin 1 Tablet(s) Oral daily  naloxegol 25 milliGRAM(s) Oral daily  pantoprazole    Tablet 40 milliGRAM(s) Oral two times a day  polyethylene glycol 3350 17 Gram(s) Oral daily  potassium chloride    Tablet ER 10 milliEquivalent(s) Oral two times a day  predniSONE   Tablet 10 milliGRAM(s) Oral daily  senna 2 Tablet(s) Oral at bedtime  sucralfate 1 Gram(s) Oral two times a day  tiotropium 2.5 MICROgram(s)/olodaterol 2.5 MICROgram(s) (STIOLTO) Inhaler 2 Puff(s) Inhalation daily  vancomycin  IVPB 1250 milliGRAM(s) IV Intermittent every 24 hours    MEDICATIONS  (PRN):  acetaminophen     Tablet .. 650 milliGRAM(s) Oral every 6 hours PRN Mild Pain (1 - 3)  diphenhydrAMINE 25 milliGRAM(s) Oral every 4 hours PRN Rash and/or Itching  morphine  - Injectable 2 milliGRAM(s) IV Push every 6 hours PRN Severe Pain (7 - 10)  oxyCODONE    IR 10 milliGRAM(s) Oral every 6 hours PRN Moderate Pain (4 - 6)  sodium chloride 0.65% Nasal 1 Spray(s) Both Nostrils three times a day PRN Nasal Congestion  sodium chloride 0.9% lock flush 10 milliLiter(s) IV Push every 1 hour PRN Pre/post blood products, medications, blood draw, and to maintain line patency      Vital Signs Last 24 Hrs  T(C): 36.5 (07 Jul 2024 11:21), Max: 36.9 (07 Jul 2024 04:51)  T(F): 97.7 (07 Jul 2024 11:21), Max: 98.4 (07 Jul 2024 04:51)  HR: 85 (07 Jul 2024 11:21) (79 - 103)  BP: 101/73 (07 Jul 2024 11:21) (101/73 - 118/75)  BP(mean): --  RR: 18 (07 Jul 2024 11:21) (18 - 18)  SpO2: 93% (07 Jul 2024 11:21) (93% - 94%)    Parameters below as of 07 Jul 2024 11:21  Patient On (Oxygen Delivery Method): room air        PHYSICAL EXAM:  Right Side Focused:  Dressings clean, dry, and intact without any evidence of strike through bleeding. No malodor. Capillary return <3sec to digits. No paresthesias. Mild pain.                        10.1   10.42 )-----------( 306      ( 06 Jul 2024 11:02 )             31.2       07-06    135  |  101  |  24<H>  ----------------------------<  374<H>  5.1   |  25  |  1.50<H>    Ca    8.8      06 Jul 2024 11:02    TPro  5.8<L>  /  Alb  2.4<L>  /  TBili  0.4  /  DBili  x   /  AST  10<L>  /  ALT  20  /  AlkPhos  118  07-06            Culture - Tissue with Gram Stain (collected 05 Jul 2024 19:30)  Source: .Tissue Other, right heel bone culutre  Gram Stain (06 Jul 2024 20:16):    Few polymorphonuclear leukocytes per low power field    No organisms seen per oil power field  Preliminary Report (06 Jul 2024 20:16):    Growth in fluid media only Gram Negative Rods

## 2024-07-07 NOTE — PROGRESS NOTE ADULT - PROBLEM SELECTOR PLAN 1
Patient seen and evaluated.  Dressings are clean, dry, and intact without evidence of excessive bleeding.  Discussed post-op course with patient and answered all questions to his satisfaction.   Plan for wound VAC application tomorrow 7/8.  Will discuss plan with Dr. Todd.

## 2024-07-07 NOTE — PROGRESS NOTE ADULT - ASSESSMENT
52 YO M with past medical history of AFib (on Eliquis), indwelling Aguilera, cerebral aneurysm, CAD (s/p stents), CVA, T2DM, HTN, OM (s/p debridement), PE, perforated gastric ulcer s/p omentopexy 2019 with Dr. Mejia, transferred from [facility] for difficulty in breathing s/p cardiac arrest    CAD, Afib, s/p Cardiac Arrest, Mod-Severe MR, HTN  - s/p cardiac arrest x2 w/ AHRF.  s/p intubation with successful extubation.   - Still tolerating RA with no evidence of volume overload    - Trops + but no sig trend. No clear evidence of acute ischemia  - Continue ASA and statin      - Known with chronic AFib   - Rate controlled per flow sheets  - Continue long-acting Cardizem and Metoprolol with more lenient parameter  - Continue Eliquis    - TTE normal EF and mod-severe MR  - No sign of volume overload on exam   - lasix held for OBI initially . resolved 7/5 fu AM labs would resume po diuretics    fu podiatry recs   Monitor and replete electrolytes. Keep K>4.0 and Mg>2.0.

## 2024-07-07 NOTE — PHARMACOTHERAPY INTERVENTION NOTE - COMMENTS
Patient is a 54 y/o male being treated for R heel wound/osteomyelitis on vancomycin 1250mg q24h, current  hr*ug/mL. Will f/u trough 7/8 0500.     Vancomycin dosing by pharmacy:  1. Indication for the vancomycin: R heel wound/osteomyelitis  2. Requesting Provider: Dr. Whitten  3. Current plan and dosing regimen: 1250 mg Q24H  4. Day of therapy: 33  5. Cultures: Tissue Cx 5/26/24: E faecalis + MRSA. Tissue Cx 6/4/24: ESBL E coli + E faecalis. R heel surgical swab 6/14/24: MRSA + citrobacter freundii + pseudomonas stutzeri.  6. Target trough or AUC/BAYLEE: 400-600 hr*ug/mL  7. Day and time level is due: 7/8 05:00  8. Previous levels: 6/4 (19:31): 22, 6/5 (05:47): 14, 6/6 (20:20): 14, 6/9 (04:58): 14.4, 6/11 (04:30): 15.7, 6/13 (05:54): 17.3, 6/14 (01:25): 15.2, 6/14 (04:30): 16.1, 6/15 (5:40): 17.5, 6/16 (5:38): 15.3, 6/17 (05:52): 13.2, 6/18 (05:50): 11.8, 6/19 (06:15): 21.3, 6/20 (08:52): 21.6, 6/21 (09:30): 37.5, 06/22 (0945): 11.1, 06/23 (09:30): 15.9, 06/24 (08:00): 19.6, 06/27 (21:47): 18.2, 6/29 (10:05): 36.4 (taken during infusion), 6/30 (07:10): 20.3 7/3 0620: 14.2, 7/5 0615: 14.5  9. Expected 24-hour AUC: 490 hr*ug/m

## 2024-07-07 NOTE — PROGRESS NOTE ADULT - ASSESSMENT
lCaude Villareal is a 52 YO M with past medical history of  AF on Eliquis, indwelling Aguilera, CAD s/p stents, CVA, DMT 2, Hypertension, osteomyelitis s/p debridement, PE, transferred from [facility] for difficulty in breathing.  His recent admission was for osteomyelitis and discharged on IV Vancomycin. On ED arrival he is unresponsive, apneic, no palpable pulse or blood pressure, EKG rhythm HR < 20/min W/O P waves. Patient intubated, Code ACLS activated, IV epinephrine, IV atropine administered, pulse became palpable, still Hypotensive, IV levophed administered. Admitted to ICU consulted,  Patient woke up and agitated try to pull out ETT,  IV propofol started,

## 2024-07-07 NOTE — PROGRESS NOTE ADULT - SUBJECTIVE AND OBJECTIVE BOX
[facility] -- Brittany Lutz Pannella, Patel, Savella Goodger, Cohen  Office # [phone]      Follow Up:  s/p Cardiac Arrest, Afib with SVR, Diastolic HF    Subjective/Observations:     Seen bedside, sleeping in NAD remains on room air       REVIEW OF SYSTEMS: All other review of systems is negative unless indicated above    PAST MEDICAL & SURGICAL HISTORY:  Diabetes      Diabetes mellitus with no complication      Afib      Hypertension      BPH (benign prostatic hyperplasia)      Perforated gastric ulcer  s/p emergent ex-lap omentopexy and plication 2019      Pulmonary embolism      History of non-ST elevation myocardial infarction (NSTEMI)      Osteomyelitis  s/p debridement      CAD S/P percutaneous coronary angioplasty      Cerebrovascular accident      H/O abdominal surgery      Perforated gastric ulcer      Traumatic amputation of left foot, initial encounter          MEDICATIONS  (STANDING):  apixaban 5 milliGRAM(s) Oral two times a day  ascorbic acid 500 milliGRAM(s) Oral daily  aspirin enteric coated 81 milliGRAM(s) Oral daily  atorvastatin 40 milliGRAM(s) Oral at bedtime  benzocaine 20% Spray 1 Spray(s) Topical every 6 hours  calamine/zinc oxide Lotion 1 Application(s) Topical two times a day  cholecalciferol 1000 Unit(s) Oral daily  dextrose 10% Bolus 125 milliLiter(s) IV Bolus once  dextrose 5%. 1000 milliLiter(s) (50 mL/Hr) IV Continuous <Continuous>  dextrose 5%. 1000 milliLiter(s) (100 mL/Hr) IV Continuous <Continuous>  dextrose 50% Injectable 25 Gram(s) IV Push once  dextrose 50% Injectable 12.5 Gram(s) IV Push once  dextrose Oral Gel 15 Gram(s) Oral once  diltiazem    milliGRAM(s) Oral daily  gabapentin 100 milliGRAM(s) Oral every 12 hours  glucagon  Injectable 1 milliGRAM(s) IntraMuscular once  hydrOXYzine hydrochloride 25 milliGRAM(s) Oral two times a day  insulin lispro (ADMELOG) corrective regimen sliding scale   SubCutaneous at bedtime  insulin lispro (ADMELOG) corrective regimen sliding scale   SubCutaneous three times a day before meals  melatonin 5 milliGRAM(s) Oral at bedtime  metFORMIN 1000 milliGRAM(s) Oral two times a day  metoprolol succinate  milliGRAM(s) Oral two times a day  multivitamin 1 Tablet(s) Oral daily  naloxegol 25 milliGRAM(s) Oral daily  pantoprazole    Tablet 40 milliGRAM(s) Oral two times a day  polyethylene glycol 3350 17 Gram(s) Oral daily  potassium chloride    Tablet ER 10 milliEquivalent(s) Oral two times a day  predniSONE   Tablet 10 milliGRAM(s) Oral daily  senna 2 Tablet(s) Oral at bedtime  sucralfate 1 Gram(s) Oral two times a day  tiotropium 2.5 MICROgram(s)/olodaterol 2.5 MICROgram(s) (STIOLTO) Inhaler 2 Puff(s) Inhalation daily  vancomycin  IVPB 1250 milliGRAM(s) IV Intermittent every 24 hours    MEDICATIONS  (PRN):  acetaminophen     Tablet .. 650 milliGRAM(s) Oral every 6 hours PRN Mild Pain (1 - 3)  diphenhydrAMINE 25 milliGRAM(s) Oral every 4 hours PRN Rash and/or Itching  morphine  - Injectable 2 milliGRAM(s) IV Push every 6 hours PRN Severe Pain (7 - 10)  oxyCODONE    IR 10 milliGRAM(s) Oral every 6 hours PRN Moderate Pain (4 - 6)  sodium chloride 0.65% Nasal 1 Spray(s) Both Nostrils three times a day PRN Nasal Congestion  sodium chloride 0.9% lock flush 10 milliLiter(s) IV Push every 1 hour PRN Pre/post blood products, medications, blood draw, and to maintain line patency      Allergies    ertapenem (Blisters; Rash)  fish (Hives)    Intolerances        Vital Signs Last 24 Hrs  T(C): 36.9 (2024 04:51), Max: 36.9 (2024 04:51)  T(F): 98.4 (2024 04:51), Max: 98.4 (2024 04:51)  HR: 79 (2024 04:51) (79 - 103)  BP: 103/68 (2024 04:51) (102/68 - 118/75)  BP(mean): --  RR: 18 (2024 04:51) (18 - 18)  SpO2: 93% (2024 04:51) (93% - 94%)    Parameters below as of 2024 20:26  Patient On (Oxygen Delivery Method): room air        I&O's Summary    2024 07:01  -  2024 07:00  --------------------------------------------------------  IN: 0 mL / OUT: 2000 mL / NET: -2000 mL          PHYSICAL EXAM:  TELE: not on tele   Constitutional: NAD, awake and alert, well-developed  HEENT: Moist Mucous Membranes, Anicteric  Pulmonary: Non-labored, breath sounds are clear bilaterally, No wheezing, crackles or rhonchi  Cardiovascular: Regular, S1 and S2 nl, Murmur   Gastrointestinal: Bowel Sounds present, soft, nontender.   Lymph: No lymphadenopathy. No peripheral edema.  Skin: wounds noted on le, right foot dressing   Psych:  Mood & affect appropriate    LABS: All Labs Reviewed:                        10.1   1042 )-----------( 306      ( 2024 11:02 )             31.2                         11.9   13.28 )-----------( 380      ( 2024 12:02 )             36.7                         11.7   13.33 )-----------( 382      ( 2024 11:10 )             36.6     2024 11:02    135    |  101    |  24     ----------------------------<  374    5.1     |  25     |  1.50   2024 06:15    140    |  104    |  18     ----------------------------<  202    3.8     |  27     |  1.20   2024 12:02    138    |  105    |  16     ----------------------------<  205    4.3     |  25     |  1.60     Ca    8.8        2024 11:02  Ca    9.1        2024 06:15  Ca    9.0        2024 12:02    TPro  5.8    /  Alb  2.4    /  TBili  0.4    /  DBili  x      /  AST  10     /  ALT  20     /  AlkPhos  118    2024 11:02  TPro  6.0    /  Alb  2.5    /  TBili  0.4    /  DBili  x      /  AST  12     /  ALT  20     /  AlkPhos  111    2024 12:02             EC Lead ECG:   Ventricular Rate 126 BPM    QRS Duration 76 ms    Q-T Interval 302 ms    QTC Calculation(Bazett) 437 ms    R Axis -12 degrees    T Axis 103 degrees    Diagnosis Line Atrial fibrillation with rapid ventricular response  Low voltage QRS  Nonspecific ST and T wave abnormality  Abnormal ECG  When compared with ECG of 2024 11:03,  Questionable change in QRS axis  Nonspecific T wave abnormality now evident in Inferior leads  Nonspecific T wave abnormality, worse in Anterolateral leads  Confirmed by Juventino Soto MD ([id number]) on 2024 12:52:09 PM (24 @ 11:37)      TRANSTHORACIC ECHOCARDIOGRAM REPORT  ________________________________________________________________________________                                      _______       Pt. Name:       SARAH MORRISON Study Date:    2024  MRN:            [id number]         YOB: 1970  Accession #:    [id number]        Age:           53 years  Account#:       [id number]       Gender:        M  Visit ID#  Heart Rate:                      Height:        72.05 in (183.00 cm)  Rhythm:          Weight:        222.66 lb (101.00 kg)  Blood Pressure: 88/53 mmHg       BSA/BMI:       2.23 m² / 30.16 kg/m²  ________________________________________________________________________________________  Referring Physician:    [id number] Hill Brizuela  Interpreting Physician: Rahel Boss MD  Primary Sonographer:    Mounika FELDMAN    CPT:               ECHO TTE WO CON COMP W DOPP - 88841.m  Indication(s):     Heart failure, unspecified - I50.9  Procedure:         Transthoracic echocardiogram with 2-D, M-mode and complete                     spectral and color flow Doppler.  Ordering Location: ICU1  Admission Status:  Inpatient    _______________________________________________________________________________________     CONCLUSIONS:      1. Left ventricular cavity is normal in size. Left ventricular systolic function is normal with an ejection fraction visually estimated at 55 to 60 %. There are no regional wall motion abnormalities seen.   2. Moderate to severe left ventricular hypertrophy.   3. Normal right ventricular cavity size, with normal wall thickness, and normal systolic function.   4. The left atrium is severely dilated.   5. The right atrium is severely dilated.   6. Trileaflet aortic valve with normal systolic excursion. There is focal calcification of the aortic valve leaflets.   7. Mild to moderate mitral regurgitation.   8. There is moderate calcification of the mitral valve annulus.   9. Structurally normal tricuspid valve with normal leaflet excursion.Mild tricuspid regurgitation.  10. The inferior vena cava is normal in size measuring 1.70 cm in diameter, (normal <2.1cm) with normal inspiratory collapse (normal >50%) consistent with normal right atrial pressure (~3, range 0-5mmHg).  11. Estimatedpulmonary artery systolic pressure is 43 mmHg.    ________________________________________________________________________________________  FINDINGS:     Left Ventricle:  The left ventricular cavity is normal in size. Left ventricular systolic function is normal with an ejection fraction visually estimated at 55 to 60%. There are no regional wall motion abnormalities seen. There is normal left ventricular diastolic function, with normal filling pressure. Moderate to severe left ventricular hypertrophy.     Right Ventricle:  The right ventricular cavity is normal in size, with normal wall thickness and normal systolic function.     Left Atrium:  The left atrium is severely dilated.     Right Atrium:  The right atrium is severely dilated.     Aortic Valve:  The aortic valve appears trileaflet with normal systolic excursion. There is focal calcification of the aortic valve leaflets. There is no aortic valve stenosis. There is no evidence of aortic regurgitation.     Mitral Valve:  There is moderate calcification of the mitral valve annulus. There is mild to moderate mitral regurgitation.     Tricuspid Valve:  Structurally normal tricuspid valve with normal leaflet excursion. There is mild tricuspid regurgitation. Estimated pulmonary artery systolic pressure is 43 mmHg.     Pulmonic Valve:  The pulmonic valve was not well visualized.     Systemic Veins:  The inferior vena cava is normal in size measuring 1.70 cm in diameter, (normal <2.1cm) with normal inspiratory collapse (normal >50%) consistent with normal right atrial pressure (~3, range 0-5mmHg).  ____________________________________________________________________  QUANTITATIVE DATA:  Left Ventricle Measurements: (Indexed to BSA)     IVSd (2D):   1.7 cm  LVPWd (2D):  1.4 cm  LVIDd (2D):  4.3 cm  LVIDs (2D):  3.0 cm  LV Mass:     281 g  125.9 g/m²  Visualized LV EF%: 55 to 60%     MV E Vmax:    1.07 m/s  MV A Vmax:    0.00 m/s  e' lateral:   9.68 cm/s  e' medial:    10.90 cm/s  E/e' lateral: 11.05  E/e' medial:  9.82  E/e' Average: 10.40  MV DT:        180 msec    Aorta Measurements: (Normal range) (Indexed to BSA)     Sinuses of Valsalva: 3.40 cm (3.1 - 3.7 cm)       Left Atrium Measurements: (Indexed to BSA)  LA Diam 2D: 4.00 cm       LVOT / RVOT/ Qp/Qs Data: (Indexed to BSA)  LVOT Diameter: 2.10 cm  LVOT Area:     3.46 cm²    Mitral Valve Measurements:     MV E Vmax: 1.1 m/s  MV A Vmax: 0.0 m/s       Tricuspid Valve Measurements:     TR Vmax:          3.2 m/s  TR Peak Gradient: 40.4 mmHg  RA Pressure:      3 mmHg  PASP:             43 mmHg    ________________________________________________________________________________________  Electronically signed on 2024 at 12:08:59 PM by Rahel Boss MD         *** Final ***      Radiology:

## 2024-07-07 NOTE — SOCIAL WORK PROGRESS NOTE - NSSWPROGRESSNOTE_GEN_ALL_CORE
Per MD, anticipate dc back to snf tomorrow. 7/8. Per EMR, pt does not need auth to ret as pt is ltc. Sw to follow to facilitate dc once medically cleared.

## 2024-07-07 NOTE — PROGRESS NOTE ADULT - SUBJECTIVE AND OBJECTIVE BOX
Date of Service: 07-07-24 @ 11:20    Patient is a 54y old  Male who presents with a chief complaint of Acute respiratory failure with hypoxia (07 Jul 2024 09:22)      INTERVAL HPI/OVERNIGHT EVENTS: Patient seen and examined. NAD. No complaints.    Vital Signs Last 24 Hrs  T(C): 36.9 (07 Jul 2024 04:51), Max: 36.9 (07 Jul 2024 04:51)  T(F): 98.4 (07 Jul 2024 04:51), Max: 98.4 (07 Jul 2024 04:51)  HR: 79 (07 Jul 2024 04:51) (79 - 103)  BP: 103/68 (07 Jul 2024 04:51) (102/68 - 118/75)  BP(mean): --  RR: 18 (07 Jul 2024 04:51) (18 - 18)  SpO2: 93% (07 Jul 2024 04:51) (93% - 94%)    Parameters below as of 06 Jul 2024 20:26  Patient On (Oxygen Delivery Method): room air        07-06    135  |  101  |  24<H>  ----------------------------<  374<H>  5.1   |  25  |  1.50<H>    Ca    8.8      06 Jul 2024 11:02    TPro  5.8<L>  /  Alb  2.4<L>  /  TBili  0.4  /  DBili  x   /  AST  10<L>  /  ALT  20  /  AlkPhos  118  07-06                          10.1   10.42 )-----------( 306      ( 06 Jul 2024 11:02 )             31.2       CAPILLARY BLOOD GLUCOSE      POCT Blood Glucose.: 361 mg/dL (07 Jul 2024 10:03)  POCT Blood Glucose.: 350 mg/dL (07 Jul 2024 08:00)  POCT Blood Glucose.: 307 mg/dL (06 Jul 2024 22:41)  POCT Blood Glucose.: 345 mg/dL (06 Jul 2024 21:13)    Urinalysis Basic - ( 06 Jul 2024 11:02 )    Color: x / Appearance: x / SG: x / pH: x  Gluc: 374 mg/dL / Ketone: x  / Bili: x / Urobili: x   Blood: x / Protein: x / Nitrite: x   Leuk Esterase: x / RBC: x / WBC x   Sq Epi: x / Non Sq Epi: x / Bacteria: x              acetaminophen     Tablet .. 650 milliGRAM(s) Oral every 6 hours PRN  apixaban 5 milliGRAM(s) Oral two times a day  ascorbic acid 500 milliGRAM(s) Oral daily  aspirin enteric coated 81 milliGRAM(s) Oral daily  atorvastatin 40 milliGRAM(s) Oral at bedtime  benzocaine 20% Spray 1 Spray(s) Topical every 6 hours  calamine/zinc oxide Lotion 1 Application(s) Topical two times a day  cholecalciferol 1000 Unit(s) Oral daily  dextrose 10% Bolus 125 milliLiter(s) IV Bolus once  dextrose 5%. 1000 milliLiter(s) IV Continuous <Continuous>  dextrose 5%. 1000 milliLiter(s) IV Continuous <Continuous>  dextrose 50% Injectable 12.5 Gram(s) IV Push once  dextrose 50% Injectable 25 Gram(s) IV Push once  dextrose Oral Gel 15 Gram(s) Oral once  diltiazem    milliGRAM(s) Oral daily  diphenhydrAMINE 25 milliGRAM(s) Oral every 4 hours PRN  gabapentin 100 milliGRAM(s) Oral every 12 hours  glucagon  Injectable 1 milliGRAM(s) IntraMuscular once  hydrOXYzine hydrochloride 25 milliGRAM(s) Oral two times a day  insulin lispro (ADMELOG) corrective regimen sliding scale   SubCutaneous at bedtime  insulin lispro (ADMELOG) corrective regimen sliding scale   SubCutaneous three times a day before meals  melatonin 5 milliGRAM(s) Oral at bedtime  metFORMIN 1000 milliGRAM(s) Oral two times a day  metoprolol succinate  milliGRAM(s) Oral two times a day  morphine  - Injectable 2 milliGRAM(s) IV Push every 6 hours PRN  multivitamin 1 Tablet(s) Oral daily  naloxegol 25 milliGRAM(s) Oral daily  oxyCODONE    IR 10 milliGRAM(s) Oral every 6 hours PRN  pantoprazole    Tablet 40 milliGRAM(s) Oral two times a day  polyethylene glycol 3350 17 Gram(s) Oral daily  potassium chloride    Tablet ER 10 milliEquivalent(s) Oral two times a day  predniSONE   Tablet 10 milliGRAM(s) Oral daily  senna 2 Tablet(s) Oral at bedtime  sodium chloride 0.65% Nasal 1 Spray(s) Both Nostrils three times a day PRN  sodium chloride 0.9% lock flush 10 milliLiter(s) IV Push every 1 hour PRN  sucralfate 1 Gram(s) Oral two times a day  tiotropium 2.5 MICROgram(s)/olodaterol 2.5 MICROgram(s) (STIOLTO) Inhaler 2 Puff(s) Inhalation daily  vancomycin  IVPB 1250 milliGRAM(s) IV Intermittent every 24 hours              REVIEW OF SYSTEMS:  CONSTITUTIONAL: No fever, no weight loss, or no fatigue  NECK: No pain, no stiffness  RESPIRATORY: No cough, no wheezing, no chills, no hemoptysis, No shortness of breath  CARDIOVASCULAR: No chest pain, no palpitations, no dizziness, no leg swelling  GASTROINTESTINAL: No abdominal pain. No nausea, no vomiting, no hematemesis; No diarrhea, no constipation. No melena, no hematochezia.  GENITOURINARY: No dysuria, no frequency, no hematuria, no incontinence  NEUROLOGICAL: No headaches, no loss of strength, no numbness, no tremors  SKIN: No itching, no burning  MUSCULOSKELETAL: No joint pain, no swelling; No muscle, no back, no extremity pain  PSYCHIATRIC: No depression, no mood swings,   HEME/LYMPH: No easy bruising, no bleeding gums  ALLERY AND IMMUNOLOGIC: No hives       Consultant(s) Notes Reviewed:  [X] YES  [ ] NO    PHYSICAL EXAM:  GENERAL: NAD  HEAD:  Atraumatic, Normocephalic  EYES: EOMI, PERRLA, conjunctiva and sclera clear  ENMT: No tonsillar erythema, exudates, or enlargement; Moist mucous membranes  NECK: Supple, No JVD  NERVOUS SYSTEM:  Awake & alert  CHEST/LUNG: Clear to auscultation bilaterally; No rales, rhonchi, wheezing,  HEART: Regular rate and rhythm  ABDOMEN: Soft, Nontender, Nondistended; Bowel sounds present  EXTREMITIES:  No clubbing, cyanosis, or edema  LYMPH: No lymphadenopathy noted  SKIN: No rashes      Advanced care planning discussed with patient/family [X] YES   [ ] NO    Advanced care planning discussed with patient/family. Patient's health status was discussed. All appropriate changes have been made regarding patient's end-of-life care. Advanced care planning forms reviewed/discussed/completed.  20 minutes spent.

## 2024-07-08 ENCOUNTER — TRANSCRIPTION ENCOUNTER (OUTPATIENT)
Age: 54
End: 2024-07-08

## 2024-07-08 VITALS — DIASTOLIC BLOOD PRESSURE: 76 MMHG | HEART RATE: 93 BPM | SYSTOLIC BLOOD PRESSURE: 115 MMHG

## 2024-07-08 LAB
GLUCOSE BLDC GLUCOMTR-MCNC: 293 MG/DL — HIGH (ref 70–99)
GLUCOSE BLDC GLUCOMTR-MCNC: 325 MG/DL — HIGH (ref 70–99)
VANCOMYCIN TROUGH SERPL-MCNC: 15.6 UG/ML — SIGNIFICANT CHANGE UP (ref 10–20)

## 2024-07-08 PROCEDURE — 82803 BLOOD GASES ANY COMBINATION: CPT

## 2024-07-08 PROCEDURE — 85025 COMPLETE CBC W/AUTO DIFF WBC: CPT

## 2024-07-08 PROCEDURE — 99285 EMERGENCY DEPT VISIT HI MDM: CPT | Mod: 25

## 2024-07-08 PROCEDURE — 36600 WITHDRAWAL OF ARTERIAL BLOOD: CPT

## 2024-07-08 PROCEDURE — 94799 UNLISTED PULMONARY SVC/PX: CPT

## 2024-07-08 PROCEDURE — 82550 ASSAY OF CK (CPK): CPT

## 2024-07-08 PROCEDURE — 87040 BLOOD CULTURE FOR BACTERIA: CPT

## 2024-07-08 PROCEDURE — 97110 THERAPEUTIC EXERCISES: CPT

## 2024-07-08 PROCEDURE — 82570 ASSAY OF URINE CREATININE: CPT

## 2024-07-08 PROCEDURE — 73630 X-RAY EXAM OF FOOT: CPT

## 2024-07-08 PROCEDURE — 80202 ASSAY OF VANCOMYCIN: CPT

## 2024-07-08 PROCEDURE — 82272 OCCULT BLD FECES 1-3 TESTS: CPT

## 2024-07-08 PROCEDURE — 93306 TTE W/DOPPLER COMPLETE: CPT

## 2024-07-08 PROCEDURE — C1889: CPT

## 2024-07-08 PROCEDURE — 84484 ASSAY OF TROPONIN QUANT: CPT

## 2024-07-08 PROCEDURE — 84100 ASSAY OF PHOSPHORUS: CPT

## 2024-07-08 PROCEDURE — 74018 RADEX ABDOMEN 1 VIEW: CPT

## 2024-07-08 PROCEDURE — 87641 MR-STAPH DNA AMP PROBE: CPT

## 2024-07-08 PROCEDURE — 97165 OT EVAL LOW COMPLEX 30 MIN: CPT

## 2024-07-08 PROCEDURE — 92526 ORAL FUNCTION THERAPY: CPT

## 2024-07-08 PROCEDURE — 97162 PT EVAL MOD COMPLEX 30 MIN: CPT

## 2024-07-08 PROCEDURE — 85027 COMPLETE CBC AUTOMATED: CPT

## 2024-07-08 PROCEDURE — 36415 COLL VENOUS BLD VENIPUNCTURE: CPT

## 2024-07-08 PROCEDURE — 70450 CT HEAD/BRAIN W/O DYE: CPT | Mod: MC

## 2024-07-08 PROCEDURE — 80048 BASIC METABOLIC PNL TOTAL CA: CPT

## 2024-07-08 PROCEDURE — 87070 CULTURE OTHR SPECIMN AEROBIC: CPT

## 2024-07-08 PROCEDURE — 85610 PROTHROMBIN TIME: CPT

## 2024-07-08 PROCEDURE — 86140 C-REACTIVE PROTEIN: CPT

## 2024-07-08 PROCEDURE — 82553 CREATINE MB FRACTION: CPT

## 2024-07-08 PROCEDURE — 94002 VENT MGMT INPAT INIT DAY: CPT

## 2024-07-08 PROCEDURE — 84300 ASSAY OF URINE SODIUM: CPT

## 2024-07-08 PROCEDURE — 94640 AIRWAY INHALATION TREATMENT: CPT

## 2024-07-08 PROCEDURE — 87186 SC STD MICRODIL/AGAR DIL: CPT

## 2024-07-08 PROCEDURE — 71045 X-RAY EXAM CHEST 1 VIEW: CPT

## 2024-07-08 PROCEDURE — 96374 THER/PROPH/DIAG INJ IV PUSH: CPT

## 2024-07-08 PROCEDURE — 97530 THERAPEUTIC ACTIVITIES: CPT

## 2024-07-08 PROCEDURE — 71250 CT THORAX DX C-: CPT | Mod: MC

## 2024-07-08 PROCEDURE — 74176 CT ABD & PELVIS W/O CONTRAST: CPT | Mod: MC

## 2024-07-08 PROCEDURE — 84480 ASSAY TRIIODOTHYRONINE (T3): CPT

## 2024-07-08 PROCEDURE — 88304 TISSUE EXAM BY PATHOLOGIST: CPT

## 2024-07-08 PROCEDURE — 87086 URINE CULTURE/COLONY COUNT: CPT

## 2024-07-08 PROCEDURE — 81001 URINALYSIS AUTO W/SCOPE: CPT

## 2024-07-08 PROCEDURE — 83690 ASSAY OF LIPASE: CPT

## 2024-07-08 PROCEDURE — 87077 CULTURE AEROBIC IDENTIFY: CPT

## 2024-07-08 PROCEDURE — 93005 ELECTROCARDIOGRAM TRACING: CPT

## 2024-07-08 PROCEDURE — 87640 STAPH A DNA AMP PROBE: CPT

## 2024-07-08 PROCEDURE — 99232 SBSQ HOSP IP/OBS MODERATE 35: CPT

## 2024-07-08 PROCEDURE — 83880 ASSAY OF NATRIURETIC PEPTIDE: CPT

## 2024-07-08 PROCEDURE — 94760 N-INVAS EAR/PLS OXIMETRY 1: CPT

## 2024-07-08 PROCEDURE — 83935 ASSAY OF URINE OSMOLALITY: CPT

## 2024-07-08 PROCEDURE — 82962 GLUCOSE BLOOD TEST: CPT

## 2024-07-08 PROCEDURE — 94003 VENT MGMT INPAT SUBQ DAY: CPT

## 2024-07-08 PROCEDURE — 85652 RBC SED RATE AUTOMATED: CPT

## 2024-07-08 PROCEDURE — 92610 EVALUATE SWALLOWING FUNCTION: CPT

## 2024-07-08 PROCEDURE — 80053 COMPREHEN METABOLIC PANEL: CPT

## 2024-07-08 PROCEDURE — 83735 ASSAY OF MAGNESIUM: CPT

## 2024-07-08 PROCEDURE — 87075 CULTR BACTERIA EXCEPT BLOOD: CPT

## 2024-07-08 PROCEDURE — 83605 ASSAY OF LACTIC ACID: CPT

## 2024-07-08 PROCEDURE — 0225U NFCT DS DNA&RNA 21 SARSCOV2: CPT

## 2024-07-08 PROCEDURE — 85730 THROMBOPLASTIN TIME PARTIAL: CPT

## 2024-07-08 PROCEDURE — 88311 DECALCIFY TISSUE: CPT

## 2024-07-08 PROCEDURE — 84436 ASSAY OF TOTAL THYROXINE: CPT

## 2024-07-08 PROCEDURE — 84443 ASSAY THYROID STIM HORMONE: CPT

## 2024-07-08 RX ORDER — GABAPENTIN
1 POWDER (GRAM) MISCELLANEOUS
Qty: 0 | Refills: 0 | DISCHARGE
Start: 2024-07-08

## 2024-07-08 RX ORDER — VANCOMYCIN HYDROCHLORIDE 50 MG/ML
750 KIT ORAL
Refills: 0 | DISCHARGE

## 2024-07-08 RX ORDER — GABAPENTIN
1 POWDER (GRAM) MISCELLANEOUS
Refills: 0 | DISCHARGE

## 2024-07-08 RX ORDER — METOPROLOL TARTRATE 50 MG
1 TABLET ORAL
Qty: 0 | Refills: 0 | DISCHARGE

## 2024-07-08 RX ORDER — INSULIN GLARGINE 100 [IU]/ML
15 INJECTION, SOLUTION SUBCUTANEOUS
Refills: 0 | DISCHARGE

## 2024-07-08 RX ORDER — VANCOMYCIN HYDROCHLORIDE 50 MG/ML
1.25 KIT ORAL
Qty: 0 | Refills: 0 | DISCHARGE
Start: 2024-07-08

## 2024-07-08 RX ORDER — INSULIN GLARGINE 100 [IU]/ML
7 INJECTION, SOLUTION SUBCUTANEOUS
Refills: 0 | DISCHARGE

## 2024-07-08 RX ADMIN — APIXABAN 5 MILLIGRAM(S): 5 TABLET, FILM COATED ORAL at 05:19

## 2024-07-08 RX ADMIN — HYDROXYZINE PAMOATE 25 MILLIGRAM(S): 50 CAPSULE ORAL at 05:20

## 2024-07-08 RX ADMIN — DILTIAZEM HYDROCHLORIDE 360 MILLIGRAM(S): 240 CAPSULE, EXTENDED RELEASE ORAL at 05:19

## 2024-07-08 RX ADMIN — Medication 100 MILLIGRAM(S): at 18:38

## 2024-07-08 RX ADMIN — PREDNISONE 10 MILLIGRAM(S): 10 TABLET ORAL at 05:20

## 2024-07-08 RX ADMIN — HYDROXYZINE PAMOATE 25 MILLIGRAM(S): 50 CAPSULE ORAL at 18:39

## 2024-07-08 RX ADMIN — PANTOPRAZOLE SODIUM 40 MILLIGRAM(S): 40 INJECTION, POWDER, FOR SOLUTION INTRAVENOUS at 05:19

## 2024-07-08 RX ADMIN — INSULIN LISPRO 4: 100 INJECTION, SOLUTION SUBCUTANEOUS at 12:09

## 2024-07-08 RX ADMIN — Medication 1 TABLET(S): at 12:08

## 2024-07-08 RX ADMIN — OXYCODONE HYDROCHLORIDE 10 MILLIGRAM(S): 100 SOLUTION ORAL at 13:08

## 2024-07-08 RX ADMIN — METFORMIN HYDROCHLORIDE 1000 MILLIGRAM(S): 850 TABLET, FILM COATED ORAL at 05:20

## 2024-07-08 RX ADMIN — OXYCODONE HYDROCHLORIDE 10 MILLIGRAM(S): 100 SOLUTION ORAL at 12:08

## 2024-07-08 RX ADMIN — Medication 100 MILLIGRAM(S): at 05:20

## 2024-07-08 RX ADMIN — INSULIN LISPRO 3: 100 INJECTION, SOLUTION SUBCUTANEOUS at 08:27

## 2024-07-08 RX ADMIN — Medication 1 GRAM(S): at 05:19

## 2024-07-08 RX ADMIN — OXYCODONE HYDROCHLORIDE 10 MILLIGRAM(S): 100 SOLUTION ORAL at 17:31

## 2024-07-08 RX ADMIN — APIXABAN 5 MILLIGRAM(S): 5 TABLET, FILM COATED ORAL at 18:38

## 2024-07-08 RX ADMIN — POTASSIUM CHLORIDE 10 MILLIEQUIVALENT(S): 600 TABLET, FILM COATED, EXTENDED RELEASE ORAL at 18:38

## 2024-07-08 RX ADMIN — Medication 500 MILLIGRAM(S): at 12:08

## 2024-07-08 RX ADMIN — Medication 100 MILLIGRAM(S): at 05:19

## 2024-07-08 RX ADMIN — VANCOMYCIN HYDROCHLORIDE 166.67 MILLIGRAM(S): KIT at 05:21

## 2024-07-08 RX ADMIN — Medication 25 MILLIGRAM(S): at 12:07

## 2024-07-08 RX ADMIN — PANTOPRAZOLE SODIUM 40 MILLIGRAM(S): 40 INJECTION, POWDER, FOR SOLUTION INTRAVENOUS at 18:39

## 2024-07-08 RX ADMIN — Medication 1 GRAM(S): at 18:38

## 2024-07-08 RX ADMIN — ASPIRIN 81 MILLIGRAM(S): 325 TABLET, FILM COATED ORAL at 12:07

## 2024-07-08 RX ADMIN — Medication 1000 UNIT(S): at 12:08

## 2024-07-08 RX ADMIN — NALOXEGOL OXALATE 25 MILLIGRAM(S): 25 TABLET, FILM COATED ORAL at 12:08

## 2024-07-08 RX ADMIN — METFORMIN HYDROCHLORIDE 1000 MILLIGRAM(S): 850 TABLET, FILM COATED ORAL at 18:39

## 2024-07-08 RX ADMIN — POTASSIUM CHLORIDE 10 MILLIEQUIVALENT(S): 600 TABLET, FILM COATED, EXTENDED RELEASE ORAL at 05:20

## 2024-07-08 NOTE — PROVIDER CONTACT NOTE (CRITICAL VALUE NOTIFICATION) - SITUATION
abnormal tissue culture from 7/5.   rare staph aureus  rare citrobacter  few klebsiella  rare providencia  rare bacteriods

## 2024-07-08 NOTE — PROGRESS NOTE ADULT - PROBLEM SELECTOR PROBLEM 1
Acute respiratory failure with hypoxia
Heel ulcer due to DM
Acute respiratory failure with hypoxia
Heel ulcer due to DM
Acute respiratory failure with hypoxia

## 2024-07-08 NOTE — PROGRESS NOTE ADULT - PROBLEM SELECTOR PLAN 9
? secondary to po FeSO4- will dc  described as dark vomitus by staff  check gastroccult, follow h/h re prior hx PUD  q12 PPI for now  h/o gastroparesis
? secondary to po FeSO4- will dc  described as dark vomitus by staff  check gastroccult, follow h/h re prior hx PUD  q12 PPI for now  h/o gastroparesis  ? add reglan
? secondary to po FeSO4- will dc  described as dark vomitus by staff  check gastroccult, follow h/h re prior hx PUD  q12 PPI for now  h/o gastroparesis  ? add reglan  resolved
? secondary to po FeSO4- will dc  described as dark vomitus by staff  check gastroccult, follow h/h re prior hx PUD  q12 PPI for now  h/o gastroparesis  ? add reglan
? secondary to po FeSO4- will dc  described as dark vomitus by staff  check gastroccult, follow h/h re prior hx PUD  q12 PPI for now  h/o gastroparesis  ? add reglan
? secondary to po FeSO4- will dc  described as dark vomitus by staff  check gastroccult, follow h/h re prior hx PUD  q12 PPI for now  h/o gastroparesis  ? add reglan  resolved
? secondary to po FeSO4- will dc  described as dark vomitus by staff  check gastroccult, follow h/h re prior hx PUD  q12 PPI for now  h/o gastroparesis  ? add reglan
? secondary to po FeSO4- will dc  described as dark vomitus by staff  check gastroccult, follow h/h re prior hx PUD  q12 PPI for now
? secondary to po FeSO4- will dc  described as dark vomitus by staff  check gastroccult, follow h/h re prior hx PUD  q12 PPI for now  h/o gastroparesis  ? add reglan  resolved
? secondary to po FeSO4- will dc  described as dark vomitus by staff  check gastroccult, follow h/h re prior hx PUD  q12 PPI for now  h/o gastroparesis  ? add reglan

## 2024-07-08 NOTE — PROGRESS NOTE ADULT - PROBLEM SELECTOR PLAN 3
Rate control with Dig and amiodarome  cardio eval with Dr. rashad bello
Rate control with Dig and amiodarome  cardio eval with Dr. rashad bello
Rate control with Dig and amiodarone  cardio eval with Dr. rashad bello appreciated  AC with lovenasreenx
Rate control  cardio eval
Rate control with Dig and amiodarone  cardio eval with Dr. rashad bello appreciated  AC with lovenasreenx
Rate control with Dig and amiodarone initially  cardio eval with Dr. rashad bello appreciated  AC with tana
Rate control with Dig and amiodarone  cardio eval with Dr. rashad bello appreciated  AC with lovenasreenx

## 2024-07-08 NOTE — DISCHARGE NOTE NURSING/CASE MANAGEMENT/SOCIAL WORK - PATIENT PORTAL LINK FT
You can access the FollowMyHealth Patient Portal offered by [facility] by registering at the following website: [email or web address]. By joining [employer]’s FollowMyHealth portal, you will also be able to view your health information using other applications (apps) compatible with our system.

## 2024-07-08 NOTE — PROGRESS NOTE ADULT - PROBLEM SELECTOR PROBLEM 10
Diabetic ulcer of heel

## 2024-07-08 NOTE — PROGRESS NOTE ADULT - PROBLEM SELECTOR PROBLEM 3
Chronic atrial fibrillation

## 2024-07-08 NOTE — PROGRESS NOTE ADULT - ASSESSMENT
Claude Villareal is a 54 YO M with past medical history of  AF on Eliquis, indwelling Aguilera, CAD s/p stents, CVA, DMT 2, Hypertension, osteomyelitis s/p debridement, PE, transferred from [facility] for difficulty in breathing.  His recent admission was for osteomyelitis and discharged on IV Vancomycin. On ED arrival he is unresponsive, apneic, no palpable pulse or blood pressure, EKG rhythm HR < 20/min W/O P waves. Patient intubated, Code ACLS activated, IV epinephrine, IV atropine administered, pulse became palpable, still Hypotensive, IV levophed administered. Admitted to ICU consulted,  Patient woke up and agitated try to pull out ETT,  IV propofol started,

## 2024-07-08 NOTE — PROGRESS NOTE ADULT - PROBLEM SELECTOR PROBLEM 8
NSVT (nonsustained ventricular tachycardia)

## 2024-07-08 NOTE — PROGRESS NOTE ADULT - PROBLEM SELECTOR PLAN 5
restart home doses at lower doses - BB and CCB
restart home doses at lower doses
restart home doses at lower doses - BB and CCB
restart home doses at lower doses
restart home doses at lower doses
restart home doses at lower doses - BB and CCB
restart home doses at lower doses - BB and CCB
restart home doses at lower doses
restart home doses at lower doses - BB and CCB
restart home doses at lower doses
restart home doses of CCB and BB at lower doses
restart home doses at lower doses - BB and CCB

## 2024-07-08 NOTE — PROGRESS NOTE ADULT - SUBJECTIVE AND OBJECTIVE BOX
PROGRESS NOTE   Patient is a 54y old  Male who presents with a chief complaint of Acute respiratory failure with hypoxia (08 Jul 2024 11:28)      HPI:  Known from rehab  Sarah Villareal is a 52 YO M with past medical history of  AF on Eliquis, indwelling Aguilera, CAD s/p stents, CVA, DMT 2, Hypertension, osteomyelitis s/p debridement, PE, transferred from [facility] for difficulty in breathing.  His recent admission was for osteomyelitis and discharged on IV Vancomycin. On ED arrival he is unresponsive, apneic, no palpable pulse or blood pressure, EKG rhythm HR < 20/min W/O P waves. Patient intubated, Code ACLS activated, IV epinephrine, IV atropine administered, pulse became palpable, still Hypotensive, IV levophed administered. Admitted to ICU consulted,  Patient woke up and agitated try to pull out ETT,  IV propofol started,  Patient had no nausea or vomiting in [facility].   (13 Jun 2024 23:25)      Vital Signs Last 24 Hrs  T(C): 36.6 (08 Jul 2024 11:31), Max: 36.7 (07 Jul 2024 21:36)  T(F): 97.8 (08 Jul 2024 11:31), Max: 98.1 (07 Jul 2024 21:36)  HR: 78 (08 Jul 2024 11:31) (78 - 87)  BP: 95/61 (08 Jul 2024 11:31) (95/61 - 110/75)  BP(mean): --  RR: 19 (08 Jul 2024 11:31) (18 - 19)  SpO2: 91% (08 Jul 2024 11:31) (91% - 98%)    Parameters below as of 08 Jul 2024 04:57  Patient On (Oxygen Delivery Method): room air                            PHYSICAL EXAM  GEN: SARAH VILLAREAL is a pleasant well-nourished, well developed 54y Male in no acute distress, alert awake, and oriented to person, place and time.   LE Focused:    Right Side Focused:  Dressings clean, dry, and intact without any evidence of strike through bleeding. No malodor. Capillary return <3sec to digits. No paresthesias. Moderate pain.

## 2024-07-08 NOTE — PROGRESS NOTE ADULT - PROBLEM SELECTOR PLAN 2
FS with RI coverage as per SS  check hbA1C
FS with RI coverage as per SS  check hba1c  endo eval if necessary
FS with RI coverage as per SS   hba1c  endo eval if necessary
FS with RI coverage as per SS  check hba1c  endo eval if necessary
FS with RI coverage as per SS  check hba1c  endo eval if necessary
FS with RI coverage as per SS
FS with RI coverage as per SS  check hba1c  endo eval if necessary
FS with RI coverage as per SS  check hba1c  endo eval if necessary
FS with RI coverage as per SS
FS with RI coverage as per SS  check hba1c  endo eval if necessary
FS with RI coverage as per SS   hba1c  endo eval if necessary
FS with RI coverage as per SS  endo eval if necessary
FS with RI coverage as per SS   hba1c  endo eval
FS with RI coverage as per SS  check hba1c  endo eval if necessary
FS with RI coverage as per SS  endo eval if necessary

## 2024-07-08 NOTE — PROGRESS NOTE ADULT - NS ATTEND AMEND GEN_ALL_CORE FT
52 YO M with past medical history of AFib (on Eliquis), indwelling Aguilera, cerebral aneurysm, CAD (s/p stents), CVA, T2DM, HTN, OM (s/p debridement), PE, perforated gastric ulcer s/p ometnopexy 2019 with Dr. Mejia, transferred from [facility] for difficulty in breathing.      CAD, Afib, s/p Cardiac Arrest, Mod-Severe MR, HTN  - s/p cardiac arrest x2 w/ AHRF.  s/p intubation with successful extubation.  On pressors while in ICU  - Now tolerating RA    - Trops + but no sig trend. No clear evidence of acute ischemia  - Continue ASA and statin      - Known AFib with rapid rates at times due to missed doses   - Continue Cardizem and BB with more lenient parameter. Now on Metoprolol Succinate 100mg BID.   - Continue Eliquis    - TTE normal EF and mod-severe MR  - No sign of volume overload on exam  - PO diuretic resumed. monitor cr. uptrending.     - Plan for debridement of Rt heel wound 7/5  - Pt has no active ischemia, decompensated heart failure, unstable arrythmia, or severe stenotic valvular disease. Patient is optimized from cardiovascular standpoint to proceed with planned procedure with routine hemodynamic monitoring.
52 YO M with past medical history of AFib (on Eliquis), indwelling Aguilera, cerebral aneurysm, CAD (s/p stents), CVA, T2DM, HTN, OM (s/p debridement), PE, perforated gastric ulcer s/p ometnopexy 2019 with Dr. Mejia, transferred from [facility] for difficulty in breathing.      - s/p cardiac arrest x2 w/ AHRF admitted to the ICU, sedated int/ext, on levophed and epinephrine, now downgraded to F (6/18)  - resume asa when able for known CAD s/p MICHELLE  - no statin given LFT abn, will resume when able  - arrest and johnny issues likely iatrogenic from double dosing of AVN  blockers  - Telemetry : afib 's overnight continue tele for now   - continue Lopressor 50mg q6H   - continue diltiazem 120mg Q8H  - continue Lovenox BID for AC
52 YO M with past medical history of AFib (on Eliquis), indwelling Aguilera, cerebral aneurysm, CAD (s/p stents), CVA, T2DM, HTN, OM (s/p debridement), PE, perforated gastric ulcer s/p ometnopexy 2019 with Dr. Mejia, transferred from [facility] for difficulty in breathing.      - s/p cardiac arrest x2 w/ AHRF admitted to the ICU, sedated int/ext, on levophed and epinephrine   - Primary states that he never got extra dose of CBB. johnny possibly result of resp distress.   - No clear evidence of acute ischemia  - continue asa and statin   - spoke to pt will fu as outpt for nuclear stress with Dr. France.      - continue diltiazem 120mg Q8H cont bb  - continue Lovenox BID for AC     - no sign of volume overload on exam   - ATN with improving creat    - on vanc for OM. MRSA in foot.   - Monitor and replete electrolytes. Keep K>4.0 and Mg>2.0.
54 YO M with past medical history of AFib (on Eliquis), indwelling Aguilera, cerebral aneurysm, CAD (s/p stents), CVA, T2DM, HTN, OM (s/p debridement), PE, perforated gastric ulcer s/p ometnopexy 2019 with Dr. Mejia, transferred from [facility] for difficulty in breathing.      - s/p cardiac arrest x2 w/ AHRF admitted to the ICU, sedated int/ext, on levophed and epinephrine   - Primary states that he never got extra dose of CBB. johnny possibly result of resp distress.   - No clear evidence of acute ischemia  - continue asa and statin   - spoke to pt will fu as outpt for nuclear stress with Dr. France.      - continue diltiazem 120mg Q8H cont bb  - continue Lovenox BID for AC     - no sign of volume overload on exam   - ATN with improving creat    - on vanc for OM. MRSA in foot.   - Monitor and replete electrolytes. Keep K>4.0 and Mg>2.0.
54 YO M with past medical history of AFib (on Eliquis), indwelling Aguilera, cerebral aneurysm, CAD (s/p stents), CVA, T2DM, HTN, OM (s/p debridement), PE, perforated gastric ulcer s/p ometnopexy 2019 with Dr. Mejia, transferred from [facility] for difficulty in breathing.      CAD, Afib, s/p Cardiac Arrest, Mod-Severe MR, HTN  - s/p cardiac arrest x2 w/ AHRF.  s/p intubation with successful extubation.  On pressors while in ICU  - Now tolerating RA    - Trops + but no sig trend. No clear evidence of acute ischemia  - Continue ASA and statin      - Known AFib with rapid rates at times due to missed doses   - Continue Cardizem and BB with more lenient parameter. Now on Metoprolol Succinate 100mg BID.   - Continue Eliquis    - TTE normal EF and mod-severe MR  - No sign of volume overload on exam  - cr improved and would resume po diuretics    - Plan for debridement of Rt heel wound 7/5  - Pt has no active ischemia, decompensated heart failure, unstable arrythmia, or severe stenotic valvular disease. Patient is optimized from cardiovascular standpoint to proceed with planned procedure with routine hemodynamic monitoring.
52 YO M with past medical history of AFib (on Eliquis), indwelling Aguilera, cerebral aneurysm, CAD (s/p stents), CVA, T2DM, HTN, OM (s/p debridement), PE, perforated gastric ulcer s/p ometnopexy 2019 with Dr. Mejia, transferred from [facility] for difficulty in breathing.      - s/p cardiac arrest x2 w/ AHRF admitted to the ICU, sedated int/ext, on levophed and epinephrine   - arrest and johnny issues likely iatrogenic from double dosing of AVN  blockers  - No clear evidence of acute ischemia  - continue asa and statin   - Telemetry : afib tachy at times, bb held overnight , can change parameters to only hold if sbp < 100   - continue diltiazem 120mg Q8H  - continue Lovenox BID for AC   - no sign of volume overload on exam   - ATN with improving creat
52 YO M with past medical history of AFib (on Eliquis), indwelling Aguilera, cerebral aneurysm, CAD (s/p stents), CVA, T2DM, HTN, OM (s/p debridement), PE, perforated gastric ulcer s/p ometnopexy 2019 with Dr. Mejia, transferred from [facility] for difficulty in breathing.      CAD, Afib, s/p Cardiac Arrest, Mod-Severe MR, HTN  - s/p cardiac arrest x2 w/ AHRF.  s/p intubation with successful extubation.  On pressors while in ICU  - Now tolerating RA    - Trops + but no sig trend. No clear evidence of acute ischemia  - Continue ASA and statin      - Known AFib with rapid rates at times due to missed doses   - Continue Cardizem and BB with more lenient parameter. Now on Metoprolol Succinate 100mg BID.   - Continue Eliquis    - TTE normal EF and mod-severe MR  - No sign of volume overload on exam  - Resume home PO lasix 20mg daily.    - dc planning per primary team
52 YO M with past medical history of AFib (on Eliquis), indwelling Aguilera, cerebral aneurysm, CAD (s/p stents), CVA, T2DM, HTN, OM (s/p debridement), PE, perforated gastric ulcer s/p ometnopexy 2019 with Dr. Mejia, transferred from [facility] for difficulty in breathing.      CAD, Afib, HTN  - s/p cardiac arrest x2 w/ AHRF admitted to the ICU, sedated int/ext, weaned off   levophed and epinephrine,  downgraded to GMF (6/18)  - trops + but no sig trend. No clear evidence of acute ischemia  - continue asa and statin      - known AF   - continue cardizem and bb for rate control  -on fd lovenox for AC    - TTE normal EF and mod-sev mr   - CXR on 6/14 with persistent mild left base infiltrate  - Repeat CXR unchanged   - no sign of volume overload on exam  - c/w lasix 20mg po qd    - ATN with improving creat  - + MRSA, iso , rest management per primary
54 YO M with past medical history of AFib (on Eliquis), indwelling Aguilera, cerebral aneurysm, CAD (s/p stents), CVA, T2DM, HTN, OM (s/p debridement), PE, perforated gastric ulcer s/p ometnopexy 2019 with Dr. Mejia, transferred from [facility] for difficulty in breathing.      - s/p cardiac arrest x2 w/ AHRF admitted to the ICU, sedated int/ext, weaned off   levophed and epinephrine,  downgraded to GMF (6/18)  - trops + but no sig trend. No clear evidence of acute ischemia  - continue asa and statin   - Telemetry atrial fibrillation 70-80 no events overnight can dc tele   - continue cardizem and bb  -on fd lovenox   - no sign of volume overload on exam
54 YO M with past medical history of AFib (on Eliquis), indwelling Aguilera, cerebral aneurysm, CAD (s/p stents), CVA, T2DM, HTN, OM (s/p debridement), PE, perforated gastric ulcer s/p ometnopexy 2019 with Dr. Mejia, transferred from [facility] for difficulty in breathing.      CAD, Afib, s/p Cardiac Arrest, Mod-Severe MR, HTN  - s/p cardiac arrest x2 w/ AHRF.  s/p intubation with successful extubation.  On pressors while in ICU  - Now tolerating RA    - Trops + but no sig trend. No clear evidence of acute ischemia  - Continue ASA and statin      - Known AFib with rapid rates at times due to missed doses   - Continue Cardizem and BB with more lenient parameter. Now on Metoprolol Succinate 100mg BID and Cardizem long acting.   - Continue Eliquis    - TTE normal EF and mod-severe MR  - No sign of volume overload on exam  - Needs repeat BMP. If Cr normalized, can resume home Lasix    - s/p debridement of Rt heel wound 7/5  - pt tolerated procedure well.
I have personally seen and examined the patient in detail.  I have spoken to the provider regarding the assessment and plan of care.  I have made changes to the note accordingly.     52 YO M with past medical history of AFib (on Eliquis), indwelling Aguilera, cerebral aneurysm, CAD (s/p stents), CVA, T2DM, HTN, OM (s/p debridement), PE, perforated gastric ulcer s/p ometnopexy 2019 with Dr. Mejia, transferred from [facility] for difficulty in breathing.      CAD, Afib, s/p Cardiac Arrest, Mod-Severe MR, HTN  - s/p cardiac arrest x2 w/ AHRF.  s/p intubation with successful extubation.  On pressors while in ICU  - Now tolerating RA    - Trops + but no sig trend. No clear evidence of acute ischemia  - Continue ASA and statin      - Known AFib with rapid rates at times due to missed doses   - Continue Cardizem and BB with more lenient parameter. Now on Metoprolol Succinate 100mg BID.   - Continue Eliquis    - TTE normal EF and mod-severe MR  - No sign of volume overload on exam  - cr improved and would resume po diuretics    - s/p debridement of Rt heel wound 7/5  - pt tolerated procedure well.
I have personally seen and examined the patient in detail.  I have spoken to the provider regarding the assessment and plan of care.  I have made changes to the note accordingly.     54 YO M with past medical history of AFib (on Eliquis), indwelling Aguilera, cerebral aneurysm, CAD (s/p stents), CVA, T2DM, HTN, OM (s/p debridement), PE, perforated gastric ulcer s/p ometnopexy 2019 with Dr. Mejia, transferred from [facility] for difficulty in breathing.      CAD, Afib, s/p Cardiac Arrest, Mod-Severe MR, HTN  - s/p cardiac arrest x2 w/ AHRF.  s/p intubation with successful extubation.  On pressors while in ICU  - Now tolerating RA    - Trops + but no sig trend. No clear evidence of acute ischemia  - Continue ASA and statin      - Known AFib with rapid rates at times due to missed doses   - Continue Cardizem and BB with more lenient parameter. Now on Metoprolol Succinate 100mg BID.   - Continue Eliquis    - TTE normal EF and mod-severe MR  - No sign of volume overload on exam  - cr improved and would resume po diuretics    - s/p debridement of Rt heel wound 7/5  - pt tolerated procedure well
I have personally seen and examined the patient in detail.  I have spoken to the provider regarding the assessment and plan of care.  I have made changes to the note accordingly.    52 YO M with past medical history of AFib (on Eliquis), indwelling Aguilera, cerebral aneurysm, CAD (s/p stents), CVA, T2DM, HTN, OM (s/p debridement), PE, perforated gastric ulcer s/p ometnopexy 2019 with Dr. Mejia, transferred from [facility] for difficulty in breathing.      CAD, Afib, s/p Cardiac Arrest, Mod-Severe MR, HTN  - s/p cardiac arrest x2 w/ AHRF.  s/p intubation with successful extubation.  On pressors while in ICU  - Now tolerating RA    - Trops + but no sig trend. No clear evidence of acute ischemia  - Continue ASA and statin      - Known AFib, now rate-control.  At times, moderately rapid at 110's   - Continue Cardizem and BB  - s/p FD Lovenox, now on Eliquis    - TTE normal EF and mod-severe MR  - no sign of volume overload on exam  - c/w Lasix 20mg po qd
52 YO M with past medical history of AFib (on Eliquis), indwelling Aguilera, cerebral aneurysm, CAD (s/p stents), CVA, T2DM, HTN, OM (s/p debridement), PE, perforated gastric ulcer s/p ometnopexy 2019 with Dr. Mejia, transferred from [facility] for difficulty in breathing.      CAD, Afib, s/p Cardiac Arrest, Mod-Severe MR, HTN  - s/p cardiac arrest x2 w/ AHRF.  s/p intubation with successful extubation.  On pressors while in ICU  - Now tolerating RA    - Trops + but no sig trend. No clear evidence of acute ischemia  - Continue ASA and statin      - Known AFib with rapid rates at times due to missed doses today in the setting of soft BP  - Continue Cardizem and BB with more lenient parameter  - if cant get may have to start on dig.   - Continue Eliquis    - TTE normal EF and mod-severe MR  - No sign of volume overload on exam  - Hold Lasix 20mg po qd for now to allow room for AVN blockers (ordered)
54 YO M with past medical history of AFib (on Eliquis), indwelling Aguilera, cerebral aneurysm, CAD (s/p stents), CVA, T2DM, HTN, OM (s/p debridement), PE, perforated gastric ulcer s/p ometnopexy 2019 with Dr. Mejia, transferred from [facility] for difficulty in breathing.      CAD, Afib, s/p Cardiac Arrest, Mod-Severe MR, HTN  - s/p cardiac arrest x2 w/ AHRF.  s/p intubation with successful extubation.  On pressors while in ICU  - Now tolerating RA    - Trops + but no sig trend. No clear evidence of acute ischemia  - Continue ASA and statin      - Known AFib with rapid rates at times due to missed doses   - Continue Cardizem and BB with more lenient parameter. Would switch Lopressor to Metoprolol Succinate 100mg BID  - Continue Eliquis    - TTE normal EF and mod-severe MR  - No sign of volume overload on exam  - Hold Lasix 20mg po qd for now to allow room for AVN blockers, but will need to be resumed in next 24-48 hrs
54 YO M with past medical history of AFib (on Eliquis), indwelling Aguilera, cerebral aneurysm, CAD (s/p stents), CVA, T2DM, HTN, OM (s/p debridement), PE, perforated gastric ulcer s/p ometnopexy 2019 with Dr. Mejia, transferred from [facility] for difficulty in breathing.      CAD, Afib, s/p Cardiac Arrest, Mod-Severe MR, HTN  - s/p cardiac arrest x2 w/ AHRF.  s/p intubation with successful extubation.  On pressors while in ICU  - Now tolerating RA    - Trops + but no sig trend. No clear evidence of acute ischemia  - Continue ASA and statin      - Known AFib with rapid rates at times due to missed doses   - Continue Cardizem and BB with more lenient parameter. Now on Metoprolol Succinate 100mg BID.   - Continue Eliquis    - TTE normal EF and mod-severe MR  - No sign of volume overload on exam  - PO diuretic resumed. monitor cr. uptrending.     - Plan for debridement of Rt heel wound 7/3  - Pt has no active ischemia, decompensated heart failure, unstable arrythmia, or severe stenotic valvular disease. Patient is optimized from cardiovascular standpoint to proceed with planned procedure with routine hemodynamic monitoring.
52 YO M with past medical history of AFib (on Eliquis), indwelling Aguilera, cerebral aneurysm, CAD (s/p stents), CVA, T2DM, HTN, OM (s/p debridement), PE, perforated gastric ulcer s/p ometnopexy 2019 with Dr. Mejia, transferred from [facility] for difficulty in breathing.      CAD, Afib, s/p Cardiac Arrest, Mod-Severe MR, HTN  - s/p cardiac arrest x2 w/ AHRF.  s/p intubation with successful extubation.  On pressors while in ICU  - Now tolerating RA    - Trops + but no sig trend. No clear evidence of acute ischemia  - Continue ASA and statin      - Known AFib with rapid rates at times due to missed doses   - Continue Cardizem and BB with more lenient parameter. Would switch Lopressor to Metoprolol Succinate 100mg BID  - Continue Eliquis    - TTE normal EF and mod-severe MR  - No sign of volume overload on exam  - Resume home PO lasix 20mg daily
52 YO M with past medical history of AFib (on Eliquis), indwelling Aguilera, cerebral aneurysm, CAD (s/p stents), CVA, T2DM, HTN, OM (s/p debridement), PE, perforated gastric ulcer s/p ometnopexy 2019 with Dr. Mejia, transferred from [facility] for difficulty in breathing.      - s/p cardiac arrest x2 w/ AHRF admitted to the ICU, sedated int/ext, on levophed and epinephrine   - Primary states that he never got extra dose of CBB  - No clear evidence of acute ischemia  - continue asa and statin        - continue diltiazem 120mg Q8H cont bb  - continue Lovenox BID for AC     - no sign of volume overload on exam   - ATN with improving creat    - on vanc for OM. MRSA in foot.   - Monitor and replete electrolytes. Keep K>4.0 and Mg>2.0.   - likely will need an ischemic evaluation but best to be done as outpt.
52 YO M with past medical history of AFib (on Eliquis), indwelling Aguilera, cerebral aneurysm, CAD (s/p stents), CVA, T2DM, HTN, OM (s/p debridement), PE, perforated gastric ulcer s/p ometnopexy 2019 with Dr. Mejia, transferred from [facility] for difficulty in breathing.      CAD, Afib, s/p Cardiac Arrest, Mod-Severe MR, HTN  - s/p cardiac arrest x2 w/ AHRF.  s/p intubation with successful extubation.  On pressors while in ICU  - Now tolerating RA    - Trops + but no sig trend. No clear evidence of acute ischemia  - Continue ASA and statin      - Known AFib with rapid rates at times due to missed doses   - Continue Cardizem and BB with more lenient parameter. Now on Metoprolol Succinate 100mg BID.   - Continue Eliquis    - TTE normal EF and mod-severe MR  - No sign of volume overload on exam  - PO diuretic resumed.     - dc planning per primary team.

## 2024-07-08 NOTE — PROGRESS NOTE ADULT - PROBLEM SELECTOR PLAN 10
local wound care  debridement per podiatry   ? partial calcanectomy
local wound care  debridement per podiatry    partial calcanectomy  awaiting wound vac
local wound care  debridement per podiatry yesterday  ? partial calcanectomy

## 2024-07-08 NOTE — PROGRESS NOTE ADULT - PROBLEM SELECTOR PROBLEM 4
Cardiac arrest

## 2024-07-08 NOTE — CHART NOTE - NSCHARTNOTEFT_GEN_A_CORE
Nutrition follow up ( chart reviewed, events noted) Brief hx:      Factors impacting intake: [ ] none [ ] nausea  [ ] vomiting [ ] diarrhea [ ] constipation  [ ]chewing problems [ ] swallowing issues  [ ] other:     Diet Prescription:   Intake:     Current Weight:     Pertinent Medications: MEDICATIONS  (STANDING):  apixaban 5 milliGRAM(s) Oral two times a day  ascorbic acid 500 milliGRAM(s) Oral daily  aspirin enteric coated 81 milliGRAM(s) Oral daily  atorvastatin 40 milliGRAM(s) Oral at bedtime  benzocaine 20% Spray 1 Spray(s) Topical every 6 hours  calamine/zinc oxide Lotion 1 Application(s) Topical two times a day  chlorhexidine 2% Cloths 1 Application(s) Topical daily  cholecalciferol 1000 Unit(s) Oral daily  dextrose 10% Bolus 125 milliLiter(s) IV Bolus once  dextrose 5%. 1000 milliLiter(s) (50 mL/Hr) IV Continuous <Continuous>  dextrose 5%. 1000 milliLiter(s) (100 mL/Hr) IV Continuous <Continuous>  dextrose 50% Injectable 25 Gram(s) IV Push once  dextrose 50% Injectable 12.5 Gram(s) IV Push once  dextrose Oral Gel 15 Gram(s) Oral once  diltiazem    milliGRAM(s) Oral daily  gabapentin 100 milliGRAM(s) Oral every 12 hours  glucagon  Injectable 1 milliGRAM(s) IntraMuscular once  hydrOXYzine hydrochloride 25 milliGRAM(s) Oral two times a day  insulin lispro (ADMELOG) corrective regimen sliding scale   SubCutaneous at bedtime  insulin lispro (ADMELOG) corrective regimen sliding scale   SubCutaneous three times a day before meals  melatonin 5 milliGRAM(s) Oral at bedtime  metFORMIN 1000 milliGRAM(s) Oral two times a day  metoprolol succinate  milliGRAM(s) Oral two times a day  multivitamin 1 Tablet(s) Oral daily  naloxegol 25 milliGRAM(s) Oral daily  pantoprazole    Tablet 40 milliGRAM(s) Oral two times a day  polyethylene glycol 3350 17 Gram(s) Oral daily  potassium chloride    Tablet ER 10 milliEquivalent(s) Oral two times a day  predniSONE   Tablet 10 milliGRAM(s) Oral daily  senna 2 Tablet(s) Oral at bedtime  sucralfate 1 Gram(s) Oral two times a day  tiotropium 2.5 MICROgram(s)/olodaterol 2.5 MICROgram(s) (STIOLTO) Inhaler 2 Puff(s) Inhalation daily  vancomycin  IVPB 1250 milliGRAM(s) IV Intermittent every 24 hours    MEDICATIONS  (PRN):  acetaminophen     Tablet .. 650 milliGRAM(s) Oral every 6 hours PRN Mild Pain (1 - 3)  diphenhydrAMINE 25 milliGRAM(s) Oral every 4 hours PRN Rash and/or Itching  morphine  - Injectable 2 milliGRAM(s) IV Push every 6 hours PRN Severe Pain (7 - 10)  oxyCODONE    IR 10 milliGRAM(s) Oral every 6 hours PRN Moderate Pain (4 - 6)  sodium chloride 0.65% Nasal 1 Spray(s) Both Nostrils three times a day PRN Nasal Congestion  sodium chloride 0.9% lock flush 10 milliLiter(s) IV Push every 1 hour PRN Pre/post blood products, medications, blood draw, and to maintain line patency    Pertinent Labs: 07-06 Na135 mmol/L Glu 374 mg/dL<H> K+ 5.1 mmol/L Cr  1.50 mg/dL<H> BUN 24 mg/dL<H> 07-06 Alb 2.4 g/dL<L>     CAPILLARY BLOOD GLUCOSE      POCT Blood Glucose.: 252 mg/dL (07 Jul 2024 21:31)  POCT Blood Glucose.: 306 mg/dL (07 Jul 2024 17:02)  POCT Blood Glucose.: 327 mg/dL (07 Jul 2024 12:31)  POCT Blood Glucose.: 361 mg/dL (07 Jul 2024 10:03)  POCT Blood Glucose.: 350 mg/dL (07 Jul 2024 08:00)    Skin:     Estimated Needs:   [ ] no change since previous assessment  [ ] recalculated:     Previous Nutrition Diagnosis:   [ ] Inadequate Energy Intake [ ]Inadequate Oral Intake [ ] Excessive Energy Intake   [ ] Underweight [ ] Increased Nutrient Needs [ ] Overweight/Obesity   [ ] Altered GI Function [ ] Unintended Weight Loss [ ] Food & Nutrition Related Knowledge Deficit [ ] Malnutrition     Nutrition Diagnosis is [ ] ongoing  [ ] resolved [ ] not applicable     New Nutrition Diagnosis: [ ] not applicable       Interventions:   Recommend  [ ] Change Diet To:  [ ] Nutrition Supplement  [ ] Nutrition Support  [ ] Other:     Monitoring and Evaluation:   [ ] PO intake [ x ] Tolerance to diet prescription [ x ] weights [ x ] labs[ x ] follow up per protocol  [ ] other: Nutrition follow up ( chart reviewed, events noted) Brief hx:  52 YO M with past medical history of AFib (on Eliquis), indwelling Aguilera, cerebral aneurysm, CAD (s/p stents), CVA, T2DM, HTN, OM (s/p debridement), PE, perforated gastric ulcer s/p omentopexy 2019 with Dr. Mejia, transferred from [facility] for difficulty in breathing s/p cardiac arrestt x2 w/ AHRF.  s/p intubation with successful extubation. Pt subsequently transferred out of the ICU . He is now POD#3 R heel debridement  for OM. Wound Vac placement planned for today and transfer bqack to SNF    At time of RD visit to pts bedside     Factors impacting intake: [ ] none [ ] nausea  [ ] vomiting [ ] diarrhea [ ] constipation  [ ]chewing problems [ ] swallowing issues  [ ] other:     Diet Prescription: easy to chew low na consistent CHO with evening snack , ensure max 8 oz po BID       Current Weight:     Pertinent Medications: MEDICATIONS  (STANDING):  apixaban 5 milliGRAM(s) Oral two times a day  ascorbic acid 500 milliGRAM(s) Oral daily  aspirin enteric coated 81 milliGRAM(s) Oral daily  atorvastatin 40 milliGRAM(s) Oral at bedtime  benzocaine 20% Spray 1 Spray(s) Topical every 6 hours  calamine/zinc oxide Lotion 1 Application(s) Topical two times a day  chlorhexidine 2% Cloths 1 Application(s) Topical daily  cholecalciferol 1000 Unit(s) Oral daily  dextrose 10% Bolus 125 milliLiter(s) IV Bolus once  dextrose 5%. 1000 milliLiter(s) (50 mL/Hr) IV Continuous <Continuous>  dextrose 5%. 1000 milliLiter(s) (100 mL/Hr) IV Continuous <Continuous>  dextrose 50% Injectable 25 Gram(s) IV Push once  dextrose 50% Injectable 12.5 Gram(s) IV Push once  dextrose Oral Gel 15 Gram(s) Oral once  diltiazem    milliGRAM(s) Oral daily  gabapentin 100 milliGRAM(s) Oral every 12 hours  glucagon  Injectable 1 milliGRAM(s) IntraMuscular once  hydrOXYzine hydrochloride 25 milliGRAM(s) Oral two times a day  insulin lispro (ADMELOG) corrective regimen sliding scale   SubCutaneous at bedtime  insulin lispro (ADMELOG) corrective regimen sliding scale   SubCutaneous three times a day before meals  melatonin 5 milliGRAM(s) Oral at bedtime  metFORMIN 1000 milliGRAM(s) Oral two times a day  metoprolol succinate  milliGRAM(s) Oral two times a day  multivitamin 1 Tablet(s) Oral daily  naloxegol 25 milliGRAM(s) Oral daily  pantoprazole    Tablet 40 milliGRAM(s) Oral two times a day  polyethylene glycol 3350 17 Gram(s) Oral daily  potassium chloride    Tablet ER 10 milliEquivalent(s) Oral two times a day  predniSONE   Tablet 10 milliGRAM(s) Oral daily  senna 2 Tablet(s) Oral at bedtime  sucralfate 1 Gram(s) Oral two times a day  tiotropium 2.5 MICROgram(s)/olodaterol 2.5 MICROgram(s) (STIOLTO) Inhaler 2 Puff(s) Inhalation daily  vancomycin  IVPB 1250 milliGRAM(s) IV Intermittent every 24 hours    MEDICATIONS  (PRN):  acetaminophen     Tablet .. 650 milliGRAM(s) Oral every 6 hours PRN Mild Pain (1 - 3)  diphenhydrAMINE 25 milliGRAM(s) Oral every 4 hours PRN Rash and/or Itching  morphine  - Injectable 2 milliGRAM(s) IV Push every 6 hours PRN Severe Pain (7 - 10)  oxyCODONE    IR 10 milliGRAM(s) Oral every 6 hours PRN Moderate Pain (4 - 6)  sodium chloride 0.65% Nasal 1 Spray(s) Both Nostrils three times a day PRN Nasal Congestion  sodium chloride 0.9% lock flush 10 milliLiter(s) IV Push every 1 hour PRN Pre/post blood products, medications, blood draw, and to maintain line patency    Pertinent Labs: 07-06 Na135 mmol/L Glu 374 mg/dL<H> K+ 5.1 mmol/L Cr  1.50 mg/dL<H> BUN 24 mg/dL<H> 07-06 Alb 2.4 g/dL<L>     CAPILLARY BLOOD GLUCOSE      POCT Blood Glucose.: 252 mg/dL (07 Jul 2024 21:31)  POCT Blood Glucose.: 306 mg/dL (07 Jul 2024 17:02)  POCT Blood Glucose.: 327 mg/dL (07 Jul 2024 12:31)  POCT Blood Glucose.: 361 mg/dL (07 Jul 2024 10:03)  POCT Blood Glucose.: 350 mg/dL (07 Jul 2024 08:00)    Skin:     Estimated Needs:   [ ] no change since previous assessment  [ ] recalculated:     Previous Nutrition Diagnosis:   [ ] Inadequate Energy Intake [ ]Inadequate Oral Intake [ ] Excessive Energy Intake   [ ] Underweight [ ] Increased Nutrient Needs [ ] Overweight/Obesity   [ ] Altered GI Function [ ] Unintended Weight Loss [ ] Food & Nutrition Related Knowledge Deficit [ ] Malnutrition     Nutrition Diagnosis is [ ] ongoing  [ ] resolved [ ] not applicable     New Nutrition Diagnosis: [ ] not applicable       Interventions:   Recommend  [ ] Change Diet To:  [ ] Nutrition Supplement  [ ] Nutrition Support  [ ] Other:     Monitoring and Evaluation:   [ ] PO intake [ x ] Tolerance to diet prescription [ x ] weights [ x ] labs[ x ] follow up per protocol  [ ] other: Nutrition follow up ( chart reviewed, events noted) Brief hx:  54 YO M with past medical history of AFib (on Eliquis), indwelling Aguilera, cerebral aneurysm, CAD (s/p stents), CVA, T2DM, HTN, OM (s/p debridement), PE, perforated gastric ulcer s/p omentopexy 2019 with Dr. Mejia, transferred from [facility] for difficulty in breathing s/p cardiac arrestt x2 w/ AHRF.  s/p intubation with successful extubation. Pt subsequently transferred out of the ICU . He is now POD#3 R heel debridement  for OM. Wound Vac placement planned for today and planned transfer back to SNF    At time of RD visit to pts bedside , states he is eating well , drinking all of Ensure Max, doesn't recall how many days ago last BM was but does not feel constipated; offers no nutrition related complaints    Factors impacting intake: [ ] none [ ] nausea  [ ] vomiting [ ] diarrhea [ ] constipation  [ ]chewing problems [X ] swallowing issues per SLP necessitating easy to chew texture diet  [ ] other:     Diet Prescription: easy to chew low na consistent CHO with evening snack , ensure max 8 oz po BID     Current Weight: 108.4 kg ( 7/7/24) 98.8 kg ( 7/4/24) 100.2 kg ( 7/3/24) 101.3 kg ( 6/14/24)   accuracy most recent wt as above is questionable    Pertinent Medications: MEDICATIONS  (STANDING):  apixaban 5 milliGRAM(s) Oral two times a day  ascorbic acid 500 milliGRAM(s) Oral daily  aspirin enteric coated 81 milliGRAM(s) Oral daily  atorvastatin 40 milliGRAM(s) Oral at bedtime  benzocaine 20% Spray 1 Spray(s) Topical every 6 hours  calamine/zinc oxide Lotion 1 Application(s) Topical two times a day  chlorhexidine 2% Cloths 1 Application(s) Topical daily  cholecalciferol 1000 Unit(s) Oral daily  dextrose 10% Bolus 125 milliLiter(s) IV Bolus once  dextrose 5%. 1000 milliLiter(s) (50 mL/Hr) IV Continuous <Continuous>  dextrose 5%. 1000 milliLiter(s) (100 mL/Hr) IV Continuous <Continuous>  dextrose 50% Injectable 25 Gram(s) IV Push once  dextrose 50% Injectable 12.5 Gram(s) IV Push once  dextrose Oral Gel 15 Gram(s) Oral once  diltiazem    milliGRAM(s) Oral daily  gabapentin 100 milliGRAM(s) Oral every 12 hours  glucagon  Injectable 1 milliGRAM(s) IntraMuscular once  hydrOXYzine hydrochloride 25 milliGRAM(s) Oral two times a day  insulin lispro (ADMELOG) corrective regimen sliding scale   SubCutaneous at bedtime  insulin lispro (ADMELOG) corrective regimen sliding scale   SubCutaneous three times a day before meals  melatonin 5 milliGRAM(s) Oral at bedtime  metFORMIN 1000 milliGRAM(s) Oral two times a day  metoprolol succinate  milliGRAM(s) Oral two times a day  multivitamin 1 Tablet(s) Oral daily  naloxegol 25 milliGRAM(s) Oral daily  pantoprazole    Tablet 40 milliGRAM(s) Oral two times a day  polyethylene glycol 3350 17 Gram(s) Oral daily  potassium chloride    Tablet ER 10 milliEquivalent(s) Oral two times a day  predniSONE   Tablet 10 milliGRAM(s) Oral daily  senna 2 Tablet(s) Oral at bedtime  sucralfate 1 Gram(s) Oral two times a day  tiotropium 2.5 MICROgram(s)/olodaterol 2.5 MICROgram(s) (STIOLTO) Inhaler 2 Puff(s) Inhalation daily  vancomycin  IVPB 1250 milliGRAM(s) IV Intermittent every 24 hours    MEDICATIONS  (PRN):  acetaminophen     Tablet .. 650 milliGRAM(s) Oral every 6 hours PRN Mild Pain (1 - 3)  diphenhydrAMINE 25 milliGRAM(s) Oral every 4 hours PRN Rash and/or Itching  morphine  - Injectable 2 milliGRAM(s) IV Push every 6 hours PRN Severe Pain (7 - 10)  oxyCODONE    IR 10 milliGRAM(s) Oral every 6 hours PRN Moderate Pain (4 - 6)  sodium chloride 0.65% Nasal 1 Spray(s) Both Nostrils three times a day PRN Nasal Congestion  sodium chloride 0.9% lock flush 10 milliLiter(s) IV Push every 1 hour PRN Pre/post blood products, medications, blood draw, and to maintain line patency    Pertinent Labs: 07-06 Na135 mmol/L Glu 374 mg/dL<H> K+ 5.1 mmol/L Cr  1.50 mg/dL<H> BUN 24 mg/dL<H> 07-06 Alb 2.4 g/dL<L>     CAPILLARY BLOOD GLUCOSE      POCT Blood Glucose.: 252 mg/dL (07 Jul 2024 21:31)  POCT Blood Glucose.: 306 mg/dL (07 Jul 2024 17:02)  POCT Blood Glucose.: 327 mg/dL (07 Jul 2024 12:31)  POCT Blood Glucose.: 361 mg/dL (07 Jul 2024 10:03)  POCT Blood Glucose.: 350 mg/dL (07 Jul 2024 08:00)    Skin: stage II PI to sacrum and L buttock   edema: 1+ R foot and knee    Estimated Needs:   [X ] no change since previous assessment  [ ] recalculated:     Previous Nutrition Diagnosis:   [ ] Inadequate Energy Intake [ ]Inadequate Oral Intake [ ] Excessive Energy Intake   [ ] Underweight [ X] Increased Nutrient Needs [ ] Overweight/Obesity [x] swallow difficulty   [ ] Altered GI Function [ ] Unintended Weight Loss [ ] Food & Nutrition Related Knowledge Deficit [ ] Malnutrition     Nutrition Diagnosis is [x ] ongoing  [ ] resolved [ ] not applicable     New Nutrition Diagnosis: [X ] not applicable       Interventions:   Recommend  [ ] Change Diet To:  [ ] Nutrition Supplement  [ ] Nutrition Support  [X ] Other:     Monitoring and Evaluation:   [X ] PO intake [ x ] Tolerance to diet prescription [ x ] weights [ x ] labs[ x ] follow up per protocol  [X ] other: skin integrity

## 2024-07-08 NOTE — SOCIAL WORK PROGRESS NOTE - NSSWPROGRESSNOTE_GEN_ALL_CORE
Discussed today at rounds and with MD. Wound vac was placed today and MD confirmed he can be discharged. Updates sent to [facility] and they are ordering wound vac. They agreed to take him back today and said wound vac will be placed on him tomorrow. Message left for podiatry re. plan. Assistant nurse manager aware that wound vac will need to be removed prior to d/c. Requested ambulance thru [facility] [phone] for 5:30PM . I met with patient at bedside and he is agreeable to d/c back to [facility]. He declined to have me notify any family.

## 2024-07-08 NOTE — PROVIDER CONTACT NOTE (CRITICAL VALUE NOTIFICATION) - ACTION/TREATMENT ORDERED:
ongoing
pt is on contact for MRSA IN THE WOUND
MD will order
No change in outpatient abx treatment
Pt on Ventilator, Pt to be given HCO3 as per ICU PA
Pt transferring to ICU at this time
continues same TX
MD aware, stated he is leaving on IV vanco until 8/17

## 2024-07-08 NOTE — PROGRESS NOTE ADULT - ASSESSMENT
52 YO M with past medical history of AFib (on Eliquis), indwelling Aguilera, cerebral aneurysm, CAD (s/p stents), CVA, T2DM, HTN, OM (s/p debridement), PE, perforated gastric ulcer s/p omentopexy 2019 with Dr. Mejia, transferred from [facility] for difficulty in breathing s/p cardiac arrest    CAD, Afib, s/p Cardiac Arrest, Mod-Severe MR, HTN  - s/p asystolic cardiac arrest x2 w/ AHRF.  s/p intubation with successful extubation.   - Still tolerating RA with no evidence of volume overload    - Trops + but no sig trend. No clear evidence of acute ischemia  - Continue ASA and statin      - Known with chronic AFib   - Remains rate controlled per flow sheets  - Continue long-acting Cardizem and Metoprolol with more lenient parameter  - Continue Eliquis    - TTE showed normal EF and mod-severe MR  - No sign of volume overload on exam  - Lasix on hold for OBI as of 7/6.  No labs available x 2 days.  Would obtain BMP.  If normalized, can resume    - BP stable and controlled  - Monitor and replete electrolytes. Keep K>4.0 and Mg>2.0.  - Will continue to follow    Henrietta Kim DNP, NP-C, AGACNP-C  Cardiology   Call TEAMS

## 2024-07-08 NOTE — CHART NOTE - NSCHARTNOTESELECT_GEN_ALL_CORE
MICU - restraints/Event Note
Nutrition Services
Nutrition Services
POCUS/Event Note
Event Note
Event Note
MICU/Event Note
Nutrition Services
Nutrition Services

## 2024-07-08 NOTE — PROGRESS NOTE ADULT - PROBLEM SELECTOR PLAN 8
beta blocker  out patient stress test per cardio
increase beta blocker  cardio eval and follow up
increase beta blocker  cardio eval and follow up  ? card cath
beta blocker  out patient stress test per cardio

## 2024-07-08 NOTE — PROGRESS NOTE ADULT - ASSESSMENT
54 YO M with  AF on Eliquis, indwelling Aguilera, CAD s/p stents, CVA, DMT 2, Hypertension, osteomyelitis s/p debridement, PE, transferred from [facility] for difficulty in breathing.  His recent admission was for osteomyelitis and discharged on IV Vancomycin. Reported at [facility] he was given a dose of ertapenem and developed difficulty breathing followed by apnea. On ED arrival he was unresponsive, apneic, no palpable pulse or blood pressure, EKG rhythm HR < 20/min W/O P waves. Patient intubated,     RECOMMENDATIONS  1-Asystolic cardiac arrest, Acute hypoxic respiratory failure, Shock  -rec avoiding carbapenems and fine with just Vanco    2-Osteomyeliits right calcaneous  6/4 S/p Selective debridement of wound 04-Jun-2024 10:52:57  Parviz Todd  6/4  TCx MRSA, Escherichia coli ESBL  Right calcaneus bone pathology:  -   Fragments of bone with chronic osteomyelitis.  -   Focal acute osteomyelitis cannot be ruled out.  6/30 adjusted with pharmacy involvement dosing to vancomycin 1250 mg IV q24, dosing per pharmacy protocol and plan had been with inability to rule out osteo   rec 6 weeks so last day 7/18  PICC placed 6/7  Selective debridement of wound, 20 sq cm 05-Jul-2024 20:29:13  Cristi Mcbride    3-Rash still has not seen dermatology so we have no diagnosis of chronic issue but has improved from recent acute blistering    Thank you for consulting us and involving us in the management of this most interesting and challenging case.  We will follow along in the care of this patient. Please call us at [phone] or text me directly on my cell# at [phone] with any concerns.

## 2024-07-08 NOTE — PROGRESS NOTE ADULT - PROBLEM SELECTOR PLAN 4
etiology unclear  ? overdose of cardiac meds  patient received multiple courses of INVANZ in past in hospital and rehab for recurrent ESBL in past  doubt anaphalaxis
etiology unclear  ? overdose of cardiac meds  patient received multiple courses of INVANZ in past in hospital and rehab for recurrent ESBL in past  doubt anaphalaxis
etiology unclear  ? overdose of cardiac meds  patient received multiple courses of INVANZ in past in hospital and rehab  doubt anaphalaxis
etiology unclear  ? overdose of cardiac meds  patient received multiple courses of INVANZ in past in hospital and rehab for recurrent ESBL in past  doubt anaphalaxis
etiology unclear  ? overdose of cardiac meds  patient received multiple courses of INVANZ in past in hospital and rehab for recurrent ESBL in past  doubt anaphalaxis
etiology unclear  ? overdose of cardiac meds  patient received multiple courses of INVANZ in past  doubt anaphalaxis
etiology unclear  ? overdose of cardiac meds  patient received multiple courses of INVANZ in past in hospital and rehab for recurrent ESBL in past  doubt anaphalaxis
etiology unclear  ? overdose of cardiac meds  patient received multiple courses of INVANZ in past in hospital and rehab for recurrent ESBL in past  doubt anaphalaxis
etiology unclear  ? overdose of cardiac meds  patient received multiple courses of INVANZ in past in hospital and rehab  doubt anaphalaxis
etiology unclear  ? overdose of cardiac meds  patient received multiple courses of INVANZ in past in hospital and rehab for recurrent ESBL in past  doubt anaphalaxis

## 2024-07-08 NOTE — DISCHARGE NOTE NURSING/CASE MANAGEMENT/SOCIAL WORK - NSDCVIVACCINE_GEN_ALL_CORE_FT
pneumococcal polysaccharide PPV23; 31-Oct-2019 13:45; Gabbie Andrew (RN); [employer] [id number] (Exp. Date: 07-Aug-2020); IntraMuscular; Deltoid Right.; 0.5 milliLiter(s); VIS (VIS Published: 06-Oct-2009, VIS Presented: 31-Oct-2019);

## 2024-07-08 NOTE — PHARMACOTHERAPY INTERVENTION NOTE - NSPHARMCOMMASP
ASP - Dose optimization/Non-Renal dose adjustment
ASP - Therapy recommended/ Alternative therapy
ASP - Dose optimization/Non-Renal dose adjustment
ASP - Lab/ test recommended
ASP - Dose optimization/Non-Renal dose adjustment

## 2024-07-08 NOTE — PROGRESS NOTE ADULT - PROVIDER SPECIALTY LIST ADULT
Cardiology
Critical Care
Critical Care
Family Medicine
Infectious Disease
Infectious Disease
Nephrology
Nephrology
Cardiology
Critical Care
Infectious Disease
Infectious Disease
Nephrology
Podiatry
Cardiology
Critical Care
Family Medicine
Infectious Disease
Nephrology
Podiatry
Cardiology
Critical Care
Family Medicine
Infectious Disease
Nephrology
Cardiology
Cardiology
Family Medicine
Hospitalist
Podiatry
Podiatry
Family Medicine
Family Medicine
Hospitalist
Family Medicine
Internal Medicine
Hospitalist
Family Medicine
Hospitalist
Family Medicine
Internal Medicine
Family Medicine
Internal Medicine

## 2024-07-08 NOTE — PROGRESS NOTE ADULT - REASON FOR ADMISSION
Acute respiratory failure with hypoxia

## 2024-07-08 NOTE — PROGRESS NOTE ADULT - PROBLEM SELECTOR PLAN 6
local wound care  podiatry eval and follow up
local wound care
plan for OR debridement today  podiatry eval and follow up appreciated  There are no absolute medical contraindications to proposed surgical procedure.  cardiac clearance noted and appreciated as well.
s/p debridement   podiatry eval and follow up appreciated
plan for OR debridement today  podiatry eval and follow up appreciated  There are no absolute medical contraindications to proposed surgical procedure.  cardiac clearance noted and appreciated as well.
local wound care
local wound care  podiatry eval and follow up
local wound care  podiatry eval and follow up
s/p debridement   podiatry eval and follow up appreciated
plan for OR debridement today  podiatry eval and follow up appreciated  There are no absolute medical contraindications to proposed surgical procedure.  cardiac clearance noted and appreciated as well.
plan for OR debridement today  podiatry eval and follow up appreciated  There are no absolute medical contraindications to proposed surgical procedure.  cardiac clearance noted and appreciated as well.
local wound care  podiatry eval and follow up
local wound care  podiatry eval and follow up
local wound care  podiatry eval

## 2024-07-08 NOTE — PROGRESS NOTE ADULT - PROBLEM SELECTOR PLAN 1
Patient seen and evaluated.  Dressings are clean, dry, and intact.  Discussed post-op course with patient and answered all questions to his satisfaction.   Wound VAC was applied and set to continuous therapy at 125mmHg  Patient cleared for discharge from podiatry standpoint.   Patient will follow-up outpatient at wound care center for regular dressing/VAC changes and wound care.  Will discuss plan with Dr. Todd.

## 2024-07-08 NOTE — PROGRESS NOTE ADULT - NSPROGADDITIONALINFOA_GEN_ALL_CORE
restart lasix
DC soon after cardio clearance
DC plan after podiatry clearance
DC plan after podiatry clearance
cardiac clearance in place  patient is as medically optimal as possible for procedure
prognosis guarded  >75min
DC soon
prognosis guarded
DC plan after podiatry clearance
DC after cardio clearance with rate control med dosage adjustment
DC plan soon to LTC

## 2024-07-08 NOTE — PROGRESS NOTE ADULT - PROBLEM SELECTOR PROBLEM 6
Heel ulcer due to DM

## 2024-07-08 NOTE — PHARMACOTHERAPY INTERVENTION NOTE - COMMENTS
Patient is a 52 y/o male being treated for R heel wound/osteomyelitis on vancomycin 1250mg q24h, current  hr*ug/mL. Will f/u trough 7/8 0500.     Vancomycin dosing by pharmacy:  1. Indication for the vancomycin: R heel wound/osteomyelitis  2. Requesting Provider: Dr. Whitten  3. Current plan and dosing regimen: 1250 mg Q24H  4. Day of therapy: 34  5. Cultures: Tissue Cx 5/26/24: E faecalis + MRSA. Tissue Cx 6/4/24: ESBL E coli + E faecalis. R heel surgical swab 6/14/24: MRSA + citrobacter freundii + pseudomonas stutzeri.  6. Target trough or AUC/BAYLEE: 400-600 hr*ug/mL  7. Day and time level is due: 7/8 05:00  8. Previous levels: 6/4 (19:31): 22, 6/5 (05:47): 14, 6/6 (20:20): 14, 6/9 (04:58): 14.4, 6/11 (04:30): 15.7, 6/13 (05:54): 17.3, 6/14 (01:25): 15.2, 6/14 (04:30): 16.1, 6/15 (5:40): 17.5, 6/16 (5:38): 15.3, 6/17 (05:52): 13.2, 6/18 (05:50): 11.8, 6/19 (06:15): 21.3, 6/20 (08:52): 21.6, 6/21 (09:30): 37.5, 06/22 (0945): 11.1, 06/23 (09:30): 15.9, 06/24 (08:00): 19.6, 06/27 (21:47): 18.2, 6/29 (10:05): 36.4 (taken during infusion), 6/30 (07:10): 20.3 7/3 0620: 14.2, 7/5 0615: 14.5, 7/8 0524: 15.6   9. Expected 24-hour AUC: 485 hr*ug/m   Patient is a 52 y/o male being treated for R heel wound/osteomyelitis on vancomycin 1250mg q24h, current  hr*ug/mL. F/U trough 7/11/2024    Vancomycin dosing by pharmacy:  1. Indication for the vancomycin: R heel wound/osteomyelitis  2. Requesting Provider: Dr. Whitten  3. Current plan and dosing regimen: 1250 mg Q24H  4. Day of therapy: 34  5. Cultures: Tissue Cx 5/26/24: E faecalis + MRSA. Tissue Cx 6/4/24: ESBL E coli + E faecalis. R heel surgical swab 6/14/24: MRSA + citrobacter freundii + pseudomonas stutzeri.  6. Target trough or AUC/BAYLEE: 400-600 hr*ug/mL  7. Day and time level is due: 7/11 05:00  8. Previous levels: 6/4 (19:31): 22, 6/5 (05:47): 14, 6/6 (20:20): 14, 6/9 (04:58): 14.4, 6/11 (04:30): 15.7, 6/13 (05:54): 17.3, 6/14 (01:25): 15.2, 6/14 (04:30): 16.1, 6/15 (5:40): 17.5, 6/16 (5:38): 15.3, 6/17 (05:52): 13.2, 6/18 (05:50): 11.8, 6/19 (06:15): 21.3, 6/20 (08:52): 21.6, 6/21 (09:30): 37.5, 06/22 (0945): 11.1, 06/23 (09:30): 15.9, 06/24 (08:00): 19.6, 06/27 (21:47): 18.2, 6/29 (10:05): 36.4 (taken during infusion), 6/30 (07:10): 20.3 7/3 0620: 14.2, 7/5 0615: 14.5, 7/8 0524: 15.6   9. Expected 24-hour AUC: 485 hr*ug/m

## 2024-07-08 NOTE — PROVIDER CONTACT NOTE (CRITICAL VALUE NOTIFICATION) - NAME OF MD/NP/PA/DO NOTIFIED:
Dr. Alford
Dr. Mounika Quiles
Dr Juventino Whitten
MD Kapoor
MELISSA Sin
Dr. Mounika Quiles
Jaden Sin ICU provider
ICU MELISSA Ha
Md Muniz
Renee,[place]
Resident [id number] - Dr Brizuela paged regarding lab value
dr tamiko matta

## 2024-07-08 NOTE — PROVIDER CONTACT NOTE (CRITICAL VALUE NOTIFICATION) - SITUATION
Finalized R tissue culture   -rare MRSA   -rare citrobacter freundii complex  -few clabsiella pnumonaei  -rare bacterioid coccae  -rare ruminococcus

## 2024-07-08 NOTE — PROGRESS NOTE ADULT - PROBLEM SELECTOR PROBLEM 9
Nausea and vomiting

## 2024-07-08 NOTE — PROVIDER CONTACT NOTE (CRITICAL VALUE NOTIFICATION) - PERSON GIVING RESULT:
Emily Haddad-antonio
Lab
lab/ bill
YULIANA Chavez- core lab
LD
Pam Biggs
Bill-lab
Lizy [facility]
Karo JORDAN, 
Souleymane from Lab
bishop
Gordon Galarza

## 2024-07-08 NOTE — PROGRESS NOTE ADULT - SUBJECTIVE AND OBJECTIVE BOX
[facility] DIVISION of INFECTIOUS DISEASE  Juventino Whitten MD PhD, Symone Hickey MD, Anne-Marie Li MD, Jeffery Jacobs MD, Ryan Romeo MD  and providing coverage with Melody Armstrong MD  Providing Infectious Disease Consultations at [facility], [facility], [facility]'s    Office# [phone] to schedule follow up appointments  Answering Service for urgent calls or New Consults [phone]  Cell# to text for urgent issues Juventino Whitten [phone]     infectious diseases progress note:    SARAH MORRISON is a 54y y. o. Male patient    Overnight and events of the last 24hrs reviewed    Allergies    ertapenem (Blisters; Rash)  fish (Hives)    Intolerances        ANTIBIOTICS/RELEVANT:  antimicrobials  vancomycin  IVPB 1250 milliGRAM(s) IV Intermittent every 24 hours    immunologic:    OTHER:  acetaminophen     Tablet .. 650 milliGRAM(s) Oral every 6 hours PRN  apixaban 5 milliGRAM(s) Oral two times a day  ascorbic acid 500 milliGRAM(s) Oral daily  aspirin enteric coated 81 milliGRAM(s) Oral daily  atorvastatin 40 milliGRAM(s) Oral at bedtime  benzocaine 20% Spray 1 Spray(s) Topical every 6 hours  calamine/zinc oxide Lotion 1 Application(s) Topical two times a day  chlorhexidine 2% Cloths 1 Application(s) Topical daily  cholecalciferol 1000 Unit(s) Oral daily  dextrose 10% Bolus 125 milliLiter(s) IV Bolus once  dextrose 5%. 1000 milliLiter(s) IV Continuous <Continuous>  dextrose 5%. 1000 milliLiter(s) IV Continuous <Continuous>  dextrose 50% Injectable 25 Gram(s) IV Push once  dextrose 50% Injectable 12.5 Gram(s) IV Push once  dextrose Oral Gel 15 Gram(s) Oral once  diltiazem    milliGRAM(s) Oral daily  diphenhydrAMINE 25 milliGRAM(s) Oral every 4 hours PRN  gabapentin 100 milliGRAM(s) Oral every 12 hours  glucagon  Injectable 1 milliGRAM(s) IntraMuscular once  hydrOXYzine hydrochloride 25 milliGRAM(s) Oral two times a day  insulin lispro (ADMELOG) corrective regimen sliding scale   SubCutaneous at bedtime  insulin lispro (ADMELOG) corrective regimen sliding scale   SubCutaneous three times a day before meals  melatonin 5 milliGRAM(s) Oral at bedtime  metFORMIN 1000 milliGRAM(s) Oral two times a day  metoprolol succinate  milliGRAM(s) Oral two times a day  morphine  - Injectable 2 milliGRAM(s) IV Push every 6 hours PRN  multivitamin 1 Tablet(s) Oral daily  naloxegol 25 milliGRAM(s) Oral daily  oxyCODONE    IR 10 milliGRAM(s) Oral every 6 hours PRN  pantoprazole    Tablet 40 milliGRAM(s) Oral two times a day  polyethylene glycol 3350 17 Gram(s) Oral daily  potassium chloride    Tablet ER 10 milliEquivalent(s) Oral two times a day  predniSONE   Tablet 10 milliGRAM(s) Oral daily  senna 2 Tablet(s) Oral at bedtime  sodium chloride 0.65% Nasal 1 Spray(s) Both Nostrils three times a day PRN  sodium chloride 0.9% lock flush 10 milliLiter(s) IV Push every 1 hour PRN  sucralfate 1 Gram(s) Oral two times a day  tiotropium 2.5 MICROgram(s)/olodaterol 2.5 MICROgram(s) (STIOLTO) Inhaler 2 Puff(s) Inhalation daily      Objective:  Vital Signs Last 24 Hrs  T(C): 36.7 (07 Jul 2024 21:36), Max: 36.7 (07 Jul 2024 21:36)  T(F): 98.1 (07 Jul 2024 21:36), Max: 98.1 (07 Jul 2024 21:36)  HR: 87 (07 Jul 2024 21:36) (82 - 87)  BP: 110/75 (08 Jul 2024 04:57) (106/73 - 110/75)  BP(mean): --  RR: 18 (08 Jul 2024 04:57) (18 - 18)  SpO2: 98% (08 Jul 2024 04:57) (92% - 98%)    Parameters below as of 08 Jul 2024 04:57  Patient On (Oxygen Delivery Method): room air        T(C): 36.7 (07-07-24 @ 21:36), Max: 36.9 (07-07-24 @ 04:51)  T(C): 36.7 (07-07-24 @ 21:36), Max: 37.2 (07-05-24 @ 20:25)  T(C): 36.7 (07-07-24 @ 21:36), Max: 37.2 (07-05-24 @ 20:25)    PHYSICAL EXAM:  HEENT: NC atraumatic  Neck: supple  Respiratory: no accessory muscle use, breathing comfortably  Cardiovascular: distant  Gastrointestinal: normal appearing, nondistended  Extremities: no clubbing, no cyanosis,        LABS:        WBC  10.42 07-06 @ 11:02  13.28 07-04 @ 12:02  13.33 07-04 @ 11:10  15.31 07-02 @ 10:58              Creatinine: 1.50 mg/dL (07-06-24 @ 11:02)  Creatinine: 1.20 mg/dL (07-05-24 @ 06:15)  Creatinine: 1.60 mg/dL (07-04-24 @ 12:02)  Creatinine: 1.40 mg/dL (07-04-24 @ 11:10)  Creatinine: 1.50 mg/dL (07-02-24 @ 10:58)                INFLAMMATORY MARKERS      MICROBIOLOGY:              RADIOLOGY & ADDITIONAL STUDIES:

## 2024-07-08 NOTE — PROGRESS NOTE ADULT - SUBJECTIVE AND OBJECTIVE BOX
Date of Service: 07-08-24 @ 13:38    Patient is a 54y old  Male who presents with a chief complaint of Acute respiratory failure with hypoxia (08 Jul 2024 13:19)      INTERVAL HPI/OVERNIGHT EVENTS: Patient seen and examined. NAD. No complaints.    Vital Signs Last 24 Hrs  T(C): 36.6 (08 Jul 2024 11:31), Max: 36.7 (07 Jul 2024 21:36)  T(F): 97.8 (08 Jul 2024 11:31), Max: 98.1 (07 Jul 2024 21:36)  HR: 78 (08 Jul 2024 11:31) (78 - 87)  BP: 95/61 (08 Jul 2024 11:31) (95/61 - 110/75)  BP(mean): --  RR: 19 (08 Jul 2024 11:31) (18 - 19)  SpO2: 91% (08 Jul 2024 11:31) (91% - 98%)    Parameters below as of 08 Jul 2024 04:57  Patient On (Oxygen Delivery Method): room air                  CAPILLARY BLOOD GLUCOSE      POCT Blood Glucose.: 325 mg/dL (08 Jul 2024 11:58)  POCT Blood Glucose.: 293 mg/dL (08 Jul 2024 08:21)  POCT Blood Glucose.: 252 mg/dL (07 Jul 2024 21:31)  POCT Blood Glucose.: 306 mg/dL (07 Jul 2024 17:02)              acetaminophen     Tablet .. 650 milliGRAM(s) Oral every 6 hours PRN  apixaban 5 milliGRAM(s) Oral two times a day  ascorbic acid 500 milliGRAM(s) Oral daily  aspirin enteric coated 81 milliGRAM(s) Oral daily  atorvastatin 40 milliGRAM(s) Oral at bedtime  benzocaine 20% Spray 1 Spray(s) Topical every 6 hours  calamine/zinc oxide Lotion 1 Application(s) Topical two times a day  chlorhexidine 2% Cloths 1 Application(s) Topical daily  cholecalciferol 1000 Unit(s) Oral daily  dextrose 10% Bolus 125 milliLiter(s) IV Bolus once  dextrose 5%. 1000 milliLiter(s) IV Continuous <Continuous>  dextrose 5%. 1000 milliLiter(s) IV Continuous <Continuous>  dextrose 50% Injectable 12.5 Gram(s) IV Push once  dextrose 50% Injectable 25 Gram(s) IV Push once  dextrose Oral Gel 15 Gram(s) Oral once  diltiazem    milliGRAM(s) Oral daily  diphenhydrAMINE 25 milliGRAM(s) Oral every 4 hours PRN  gabapentin 100 milliGRAM(s) Oral every 12 hours  glucagon  Injectable 1 milliGRAM(s) IntraMuscular once  hydrOXYzine hydrochloride 25 milliGRAM(s) Oral two times a day  insulin lispro (ADMELOG) corrective regimen sliding scale   SubCutaneous three times a day before meals  insulin lispro (ADMELOG) corrective regimen sliding scale   SubCutaneous at bedtime  melatonin 5 milliGRAM(s) Oral at bedtime  metFORMIN 1000 milliGRAM(s) Oral two times a day  metoprolol succinate  milliGRAM(s) Oral two times a day  morphine  - Injectable 2 milliGRAM(s) IV Push every 6 hours PRN  multivitamin 1 Tablet(s) Oral daily  naloxegol 25 milliGRAM(s) Oral daily  oxyCODONE    IR 10 milliGRAM(s) Oral every 6 hours PRN  pantoprazole    Tablet 40 milliGRAM(s) Oral two times a day  polyethylene glycol 3350 17 Gram(s) Oral daily  potassium chloride    Tablet ER 10 milliEquivalent(s) Oral two times a day  predniSONE   Tablet 10 milliGRAM(s) Oral daily  senna 2 Tablet(s) Oral at bedtime  sodium chloride 0.65% Nasal 1 Spray(s) Both Nostrils three times a day PRN  sodium chloride 0.9% lock flush 10 milliLiter(s) IV Push every 1 hour PRN  sucralfate 1 Gram(s) Oral two times a day  tiotropium 2.5 MICROgram(s)/olodaterol 2.5 MICROgram(s) (STIOLTO) Inhaler 2 Puff(s) Inhalation daily  vancomycin  IVPB 1250 milliGRAM(s) IV Intermittent every 24 hours              REVIEW OF SYSTEMS:  CONSTITUTIONAL: No fever, no weight loss, or no fatigue  NECK: No pain, no stiffness  RESPIRATORY: No cough, no wheezing, no chills, no hemoptysis, No shortness of breath  CARDIOVASCULAR: No chest pain, no palpitations, no dizziness, no leg swelling  GASTROINTESTINAL: No abdominal pain. No nausea, no vomiting, no hematemesis; No diarrhea, no constipation. No melena, no hematochezia.  GENITOURINARY: No dysuria, no frequency, no hematuria, no incontinence  NEUROLOGICAL: No headaches, no loss of strength, no numbness, no tremors  SKIN: No itching, no burning  MUSCULOSKELETAL: No joint pain, no swelling; No muscle, no back, no extremity pain  PSYCHIATRIC: No depression, no mood swings,   HEME/LYMPH: No easy bruising, no bleeding gums  ALLERY AND IMMUNOLOGIC: No hives       Consultant(s) Notes Reviewed:  [X] YES  [ ] NO    PHYSICAL EXAM:  GENERAL: NAD  HEAD:  Atraumatic, Normocephalic  EYES: EOMI, PERRLA, conjunctiva and sclera clear  ENMT: No tonsillar erythema, exudates, or enlargement; Moist mucous membranes  NECK: Supple, No JVD  NERVOUS SYSTEM:  Awake & alert  CHEST/LUNG: Clear to auscultation bilaterally; No rales, rhonchi, wheezing,  HEART: Regular rate and rhythm  ABDOMEN: Soft, Nontender, Nondistended; Bowel sounds present  EXTREMITIES:  No clubbing, cyanosis, or edema  LYMPH: No lymphadenopathy noted  SKIN: No rashes      Advanced care planning discussed with patient/family [X] YES   [ ] NO    Advanced care planning discussed with patient/family. Patient's health status was discussed. All appropriate changes have been made regarding patient's end-of-life care. Advanced care planning forms reviewed/discussed/completed.  20 minutes spent.

## 2024-07-08 NOTE — PROGRESS NOTE ADULT - PROBLEM SELECTOR PLAN 7
? eczema  add steroids  improving
chronic  ? eczema  start topical steroids and h1 blockers  may need oral steroids
? eczema  add steroids
chronic  ? eczema  start topical steroids and h1 blockers
? eczema  add steroids
? eczema  add steroids
chronic  ? eczema  start topical steroids and h1 blockers
? eczema  add steroids  improving
? eczema  add steroids
? eczema  add steroids  improving
? eczema  add steroids
? eczema  add steroids  improving
? eczema  add steroids  improving

## 2024-07-08 NOTE — PROGRESS NOTE ADULT - PROBLEM SELECTOR PROBLEM 2
Diabetes mellitus

## 2024-07-08 NOTE — DISCHARGE NOTE NURSING/CASE MANAGEMENT/SOCIAL WORK - NSDCPEFALRISK_GEN_ALL_CORE
For information on Fall & Injury Prevention, visit: [email or web address] OR  [email or web address] OR  https://www.cdc.gov/steadi/patient.html

## 2024-08-07 ENCOUNTER — OUTPATIENT (OUTPATIENT)
Dept: OUTPATIENT SERVICES | Facility: HOSPITAL | Age: 54
LOS: 1 days | Discharge: HOME | End: 2024-08-07
Payer: MEDICAID

## 2024-08-07 ENCOUNTER — APPOINTMENT (OUTPATIENT)
Dept: WOUND CARE | Facility: HOSPITAL | Age: 54
End: 2024-08-07

## 2024-08-07 DIAGNOSIS — S98.912A COMPLETE TRAUMATIC AMPUTATION OF LEFT FOOT, LEVEL UNSPECIFIED, INITIAL ENCOUNTER: Chronic | ICD-10-CM

## 2024-08-07 DIAGNOSIS — K25.5 CHRONIC OR UNSPECIFIED GASTRIC ULCER WITH PERFORATION: Chronic | ICD-10-CM

## 2024-08-07 DIAGNOSIS — Z98.890 OTHER SPECIFIED POSTPROCEDURAL STATES: Chronic | ICD-10-CM

## 2024-08-07 DIAGNOSIS — L97.414 NON-PRESSURE CHRONIC ULCER OF RIGHT HEEL AND MIDFOOT WITH NECROSIS OF BONE: ICD-10-CM

## 2024-08-07 PROCEDURE — G0463: CPT

## 2024-08-07 PROCEDURE — 99213 OFFICE O/P EST LOW 20 MIN: CPT

## 2024-08-07 NOTE — VITALS
[Pain related to present condition?] : The patient's  pain is related to present condition. [] : No [de-identified] : 8/10 [FreeTextEntry1] : Oxycodone twice daily

## 2024-08-07 NOTE — PLAN
[FreeTextEntry1] : Patient examined and evaluated at this time. Continue local wound care and offloading. Patient is a high risk for infection, sepsis, limb loss, death. Patient verbalized understanding and all questions answered satisfaction at this time.   Patient understands that this is a limb salvage attempt. Spent 20 minutes for patient care and medical decision making.

## 2024-08-07 NOTE — PHYSICAL EXAM
[1+] : left 1+ [Ankle Swelling (On Exam)] : present [Ankle Swelling On The Left] : moderate [Skin Ulcer] : ulcer [Alert] : alert [Oriented to Person] : oriented to person [Oriented to Place] : oriented to place [Calm] : calm [4 x 4] : 4 x 4  [Abdominal Pad] : Abdominal Pad [] : not present [Varicose Veins Of Lower Extremities] : not present [de-identified] : calm [de-identified] : htn, hld, diabetic cardiovascular disease [de-identified] : s/p left TMA and Achilles tenotomy [de-identified] : left foot TMA , , closed.  Right posterior heel wound of skin, subcutaneous tissue, fat, healing [de-identified] : IDDM with neuropathy  [FreeTextEntry1] : heel [FreeTextEntry2] : 7.0 [FreeTextEntry3] : 9.0 [FreeTextEntry4] : 0.2 [de-identified] : serosanguinous drainage  [de-identified] : 30% [de-identified] : Silver Alginate [de-identified] : Mechanically cleansed with normal saline. Tissue removed by Dr. Todd. Silver Alginate, DSD, ABD pad, Kerlix, and stockinette applied. No sign or symptom of infection noted.  [TWNoteComboBox1] : Right [TWNoteComboBox4] : Large [TWNoteComboBox5] : No [TWNoteComboBox6] : Pressure [de-identified] : No [de-identified] : Macerated [de-identified] : <20% [de-identified] : None [de-identified] : 50% [de-identified] : Yes [de-identified] : 3x Weekly [de-identified] : Primary Dressing

## 2024-08-07 NOTE — REVIEW OF SYSTEMS
[Skin Wound] : skin wound [Limb Weakness] : limb weakness [Easy Bleeding] : a tendency for easy bleeding [Chills] : no chills [Fever] : no fever [Eye Pain] : no eye pain [Earache] : no earache [Loss Of Hearing] : no hearing loss [Chest Pain] : no chest pain [Shortness Of Breath] : no shortness of breath [Abdominal Pain] : no abdominal pain [Anxiety] : no anxiety [FreeTextEntry5] : htn, hld diabetic cardio vascular disease  [FreeTextEntry9] : s/p left foot TMA w/Achilles tenotomy [de-identified] : left foot dfu3 , s/p TMA   , healed, left heel DFU 3 ,skin , subcutaneous , right heel pre trophic  [de-identified] : diabetic neuropathy [de-identified] : IDDM with neuropathy

## 2024-08-07 NOTE — HISTORY OF PRESENT ILLNESS
[FreeTextEntry1] : Pt has a pressure ulcer on his right heel that was biopsied and debrided on 3/26/24 while patient was hospitalized in [facility]. He is here to follow up and stated that wound care at the facility he lives at is sub-par - all they've been doing is putting Dakins on it and wrapping it in dry dressing and ACE bandage. Pt was placed on IV Zosyn for 7 days due to odor by MD at facility (Dr. Murray).  8/7/2024 patient seen for follow-up of right heel ulcer, healing well with granular tissue at this time.

## 2024-08-07 NOTE — ASSESSMENT
[Verbal] : Verbal [Written] : Written [Good - alert, interested, motivated] : Good - alert, interested, motivated [Verbalizes knowledge/Understanding] : Verbalizes knowledge/understanding [Dressing changes] : dressing changes [Pressure relief] : pressure relief [Signs and symptoms of infection] : sign and symptoms of infection [How and When to Call] : how and when to call [Off-loading] : off-loading [Patient responsibility to plan of care] : patient responsibility to plan of care [] : Yes [FreeTextEntry2] : 1. Promote skin integrity. 2. Maintain proper nutrtion. 3. Prevent infection.

## 2024-08-11 DIAGNOSIS — E11.69 TYPE 2 DIABETES MELLITUS WITH OTHER SPECIFIED COMPLICATION: ICD-10-CM

## 2024-08-11 DIAGNOSIS — Z87.440 PERSONAL HISTORY OF URINARY (TRACT) INFECTIONS: ICD-10-CM

## 2024-08-11 DIAGNOSIS — D64.9 ANEMIA, UNSPECIFIED: ICD-10-CM

## 2024-08-11 DIAGNOSIS — Z91.013 ALLERGY TO SEAFOOD: ICD-10-CM

## 2024-08-11 DIAGNOSIS — Z86.73 PERSONAL HISTORY OF TRANSIENT ISCHEMIC ATTACK (TIA), AND CEREBRAL INFARCTION WITHOUT RESIDUAL DEFICITS: ICD-10-CM

## 2024-08-11 DIAGNOSIS — Z89.432 ACQUIRED ABSENCE OF LEFT FOOT: ICD-10-CM

## 2024-08-11 DIAGNOSIS — Z87.891 PERSONAL HISTORY OF NICOTINE DEPENDENCE: ICD-10-CM

## 2024-08-11 DIAGNOSIS — E87.6 HYPOKALEMIA: ICD-10-CM

## 2024-08-11 DIAGNOSIS — M86.671 OTHER CHRONIC OSTEOMYELITIS, RIGHT ANKLE AND FOOT: ICD-10-CM

## 2024-08-11 DIAGNOSIS — Z79.01 LONG TERM (CURRENT) USE OF ANTICOAGULANTS: ICD-10-CM

## 2024-08-11 DIAGNOSIS — Z79.899 OTHER LONG TERM (CURRENT) DRUG THERAPY: ICD-10-CM

## 2024-08-11 DIAGNOSIS — N40.1 BENIGN PROSTATIC HYPERPLASIA WITH LOWER URINARY TRACT SYMPTOMS: ICD-10-CM

## 2024-08-11 DIAGNOSIS — Z79.4 LONG TERM (CURRENT) USE OF INSULIN: ICD-10-CM

## 2024-08-11 DIAGNOSIS — E11.59 TYPE 2 DIABETES MELLITUS WITH OTHER CIRCULATORY COMPLICATIONS: ICD-10-CM

## 2024-08-11 DIAGNOSIS — L97.414 NON-PRESSURE CHRONIC ULCER OF RIGHT HEEL AND MIDFOOT WITH NECROSIS OF BONE: ICD-10-CM

## 2024-08-11 DIAGNOSIS — E11.40 TYPE 2 DIABETES MELLITUS WITH DIABETIC NEUROPATHY, UNSPECIFIED: ICD-10-CM

## 2024-08-21 ENCOUNTER — APPOINTMENT (OUTPATIENT)
Dept: WOUND CARE | Facility: HOSPITAL | Age: 54
End: 2024-08-21

## 2024-08-29 NOTE — PATIENT PROFILE ADULT - NSPRESCRUSEDDRG_GEN_A_NUR
"[facility]  Procedure: Rapid sequence orotracheal intubation with video laryngoscopy        Indication: Acute hypoxemic respiratory failure   Consent: Informed consent obtained   Performed by: VIRGINIA Park MD  Induction agent: 10 mg Etomidate   Neuromuscular blocking agent: 100 mg Rocuronium     Preoxygenation: BiPAP     Device used: CMAC 3 blade & tracheal tube introducer (\"bougie\")   Number of attempts: 1  Ventilation between attempts: Not applicable  Time to confirmed intubation: 0-30 seconds  Tube size: 7.5 mm   Tube Secured at:  23 cm at the lip     Pulse oximetry before intervention: 94  Pulse oximetry during or after intervention (lowest): 92  Post-intubation assessment: CO2 detector, Breath sounds, Chest rise, Tube fog  Cormack-Lehane grade (I-IV): Grade I - full view of the glottic aperture      Post procedure CXR: Pending      VIRGINIA Park MD  Critical Care Medicine     " No

## 2024-09-05 ENCOUNTER — OUTPATIENT (OUTPATIENT)
Dept: OUTPATIENT SERVICES | Facility: HOSPITAL | Age: 54
LOS: 1 days | Discharge: HOME | End: 2024-09-05
Payer: MEDICAID

## 2024-09-05 ENCOUNTER — RESULT REVIEW (OUTPATIENT)
Age: 54
End: 2024-09-05

## 2024-09-05 ENCOUNTER — APPOINTMENT (OUTPATIENT)
Dept: WOUND CARE | Facility: HOSPITAL | Age: 54
End: 2024-09-05

## 2024-09-05 ENCOUNTER — INPATIENT (INPATIENT)
Facility: HOSPITAL | Age: 54
LOS: 12 days | Discharge: HOME | DRG: 639 | End: 2024-09-18
Attending: FAMILY MEDICINE | Admitting: FAMILY MEDICINE
Payer: MEDICAID

## 2024-09-05 VITALS
HEART RATE: 69 BPM | BODY MASS INDEX: 28.23 KG/M2 | HEIGHT: 74 IN | WEIGHT: 220 LBS | DIASTOLIC BLOOD PRESSURE: 59 MMHG | TEMPERATURE: 98.3 F | SYSTOLIC BLOOD PRESSURE: 85 MMHG | RESPIRATION RATE: 18 BRPM | OXYGEN SATURATION: 97 %

## 2024-09-05 VITALS
RESPIRATION RATE: 26 BRPM | OXYGEN SATURATION: 96 % | DIASTOLIC BLOOD PRESSURE: 49 MMHG | HEIGHT: 74 IN | HEART RATE: 147 BPM | SYSTOLIC BLOOD PRESSURE: 66 MMHG | WEIGHT: 207.9 LBS | TEMPERATURE: 98 F

## 2024-09-05 DIAGNOSIS — S98.912A COMPLETE TRAUMATIC AMPUTATION OF LEFT FOOT, LEVEL UNSPECIFIED, INITIAL ENCOUNTER: Chronic | ICD-10-CM

## 2024-09-05 DIAGNOSIS — M86.671 OTHER CHRONIC OSTEOMYELITIS, RIGHT ANKLE AND FOOT: ICD-10-CM

## 2024-09-05 DIAGNOSIS — A41.9 SEPSIS, UNSPECIFIED ORGANISM: ICD-10-CM

## 2024-09-05 DIAGNOSIS — Z98.890 OTHER SPECIFIED POSTPROCEDURAL STATES: Chronic | ICD-10-CM

## 2024-09-05 DIAGNOSIS — I95.9 HYPOTENSION, UNSPECIFIED: ICD-10-CM

## 2024-09-05 DIAGNOSIS — E11.40 TYPE 2 DIABETES MELLITUS WITH DIABETIC NEUROPATHY, UNSPECIFIED: ICD-10-CM

## 2024-09-05 DIAGNOSIS — E11.69 TYPE 2 DIABETES MELLITUS WITH OTHER SPECIFIED COMPLICATION: ICD-10-CM

## 2024-09-05 DIAGNOSIS — L97.414 NON-PRESSURE CHRONIC ULCER OF RIGHT HEEL AND MIDFOOT WITH NECROSIS OF BONE: ICD-10-CM

## 2024-09-05 DIAGNOSIS — E11.621 TYPE 2 DIABETES MELLITUS WITH FOOT ULCER: ICD-10-CM

## 2024-09-05 DIAGNOSIS — I48.91 UNSPECIFIED ATRIAL FIBRILLATION: ICD-10-CM

## 2024-09-05 DIAGNOSIS — K25.5 CHRONIC OR UNSPECIFIED GASTRIC ULCER WITH PERFORATION: Chronic | ICD-10-CM

## 2024-09-05 DIAGNOSIS — E11.9 TYPE 2 DIABETES MELLITUS WITHOUT COMPLICATIONS: ICD-10-CM

## 2024-09-05 LAB
ALBUMIN SERPL ELPH-MCNC: 1.6 G/DL — LOW (ref 3.3–5)
ALP SERPL-CCNC: 182 U/L — HIGH (ref 40–120)
ALT FLD-CCNC: 26 U/L — SIGNIFICANT CHANGE UP (ref 12–78)
ANION GAP SERPL CALC-SCNC: 9 MMOL/L — SIGNIFICANT CHANGE UP (ref 5–17)
APTT BLD: 37.5 SEC — HIGH (ref 24.5–35.6)
AST SERPL-CCNC: 23 U/L — SIGNIFICANT CHANGE UP (ref 15–37)
BASOPHILS # BLD AUTO: 0.04 K/UL — SIGNIFICANT CHANGE UP (ref 0–0.2)
BASOPHILS NFR BLD AUTO: 0.2 % — SIGNIFICANT CHANGE UP (ref 0–2)
BILIRUB SERPL-MCNC: 0.6 MG/DL — SIGNIFICANT CHANGE UP (ref 0.2–1.2)
BUN SERPL-MCNC: 22 MG/DL — SIGNIFICANT CHANGE UP (ref 7–23)
CALCIUM SERPL-MCNC: 8 MG/DL — LOW (ref 8.5–10.1)
CHLORIDE SERPL-SCNC: 107 MMOL/L — SIGNIFICANT CHANGE UP (ref 96–108)
CO2 SERPL-SCNC: 23 MMOL/L — SIGNIFICANT CHANGE UP (ref 22–31)
CREAT SERPL-MCNC: 1.2 MG/DL — SIGNIFICANT CHANGE UP (ref 0.5–1.3)
EGFR: 71 ML/MIN/1.73M2 — SIGNIFICANT CHANGE UP
EOSINOPHIL # BLD AUTO: 0.08 K/UL — SIGNIFICANT CHANGE UP (ref 0–0.5)
EOSINOPHIL NFR BLD AUTO: 0.4 % — SIGNIFICANT CHANGE UP (ref 0–6)
GLUCOSE BLDC GLUCOMTR-MCNC: 104 MG/DL — HIGH (ref 70–99)
GLUCOSE BLDC GLUCOMTR-MCNC: 70 MG/DL — SIGNIFICANT CHANGE UP (ref 70–99)
GLUCOSE SERPL-MCNC: 73 MG/DL — SIGNIFICANT CHANGE UP (ref 70–99)
GRAM STN FLD: ABNORMAL
HCT VFR BLD CALC: 27.3 % — LOW (ref 39–50)
HGB BLD-MCNC: 8.3 G/DL — LOW (ref 13–17)
IMM GRANULOCYTES NFR BLD AUTO: 0.9 % — SIGNIFICANT CHANGE UP (ref 0–0.9)
INR BLD: 1.99 RATIO — HIGH (ref 0.85–1.18)
LACTATE SERPL-SCNC: 1.1 MMOL/L — SIGNIFICANT CHANGE UP (ref 0.7–2)
LACTATE SERPL-SCNC: 2.7 MMOL/L — HIGH (ref 0.7–2)
LYMPHOCYTES # BLD AUTO: 0.84 K/UL — LOW (ref 1–3.3)
LYMPHOCYTES # BLD AUTO: 4.3 % — LOW (ref 13–44)
MCHC RBC-ENTMCNC: 28.8 PG — SIGNIFICANT CHANGE UP (ref 27–34)
MCHC RBC-ENTMCNC: 30.4 GM/DL — LOW (ref 32–36)
MCV RBC AUTO: 94.8 FL — SIGNIFICANT CHANGE UP (ref 80–100)
MONOCYTES # BLD AUTO: 1.09 K/UL — HIGH (ref 0–0.9)
MONOCYTES NFR BLD AUTO: 5.6 % — SIGNIFICANT CHANGE UP (ref 2–14)
NEUTROPHILS # BLD AUTO: 17.19 K/UL — HIGH (ref 1.8–7.4)
NEUTROPHILS NFR BLD AUTO: 88.6 % — HIGH (ref 43–77)
NRBC # BLD: 0 /100 WBCS — SIGNIFICANT CHANGE UP (ref 0–0)
PLATELET # BLD AUTO: 513 K/UL — HIGH (ref 150–400)
POTASSIUM SERPL-MCNC: 4.3 MMOL/L — SIGNIFICANT CHANGE UP (ref 3.5–5.3)
POTASSIUM SERPL-SCNC: 4.3 MMOL/L — SIGNIFICANT CHANGE UP (ref 3.5–5.3)
PROCALCITONIN SERPL-MCNC: 1.39 NG/ML — HIGH
PROT SERPL-MCNC: 5.6 G/DL — LOW (ref 6–8.3)
PROTHROM AB SERPL-ACNC: 22.2 SEC — HIGH (ref 9.5–13)
RBC # BLD: 2.88 M/UL — LOW (ref 4.2–5.8)
RBC # FLD: 16.5 % — HIGH (ref 10.3–14.5)
SARS-COV-2 RNA SPEC QL NAA+PROBE: SIGNIFICANT CHANGE UP
SODIUM SERPL-SCNC: 139 MMOL/L — SIGNIFICANT CHANGE UP (ref 135–145)
SPECIMEN SOURCE: SIGNIFICANT CHANGE UP
TSH SERPL-MCNC: 6.01 UIU/ML — HIGH (ref 0.36–3.74)
WBC # BLD: 19.41 K/UL — HIGH (ref 3.8–10.5)
WBC # FLD AUTO: 19.41 K/UL — HIGH (ref 3.8–10.5)

## 2024-09-05 PROCEDURE — G0463: CPT

## 2024-09-05 PROCEDURE — 99223 1ST HOSP IP/OBS HIGH 75: CPT

## 2024-09-05 PROCEDURE — 71045 X-RAY EXAM CHEST 1 VIEW: CPT | Mod: 26

## 2024-09-05 PROCEDURE — 87077 CULTURE AEROBIC IDENTIFY: CPT

## 2024-09-05 PROCEDURE — 99292 CRITICAL CARE ADDL 30 MIN: CPT

## 2024-09-05 PROCEDURE — 87075 CULTR BACTERIA EXCEPT BLOOD: CPT

## 2024-09-05 PROCEDURE — 87070 CULTURE OTHR SPECIMN AEROBIC: CPT

## 2024-09-05 PROCEDURE — 87186 SC STD MICRODIL/AGAR DIL: CPT

## 2024-09-05 PROCEDURE — 73630 X-RAY EXAM OF FOOT: CPT | Mod: 26,RT

## 2024-09-05 PROCEDURE — 99291 CRITICAL CARE FIRST HOUR: CPT

## 2024-09-05 PROCEDURE — 99221 1ST HOSP IP/OBS SF/LOW 40: CPT | Mod: 25

## 2024-09-05 PROCEDURE — 99285 EMERGENCY DEPT VISIT HI MDM: CPT

## 2024-09-05 PROCEDURE — 93306 TTE W/DOPPLER COMPLETE: CPT | Mod: 26

## 2024-09-05 PROCEDURE — 11043 DBRDMT MUSC&/FSCA 1ST 20/<: CPT

## 2024-09-05 RX ORDER — OXYCODONE HYDROCHLORIDE 5 MG/1
10 TABLET ORAL
Refills: 0 | Status: DISCONTINUED | OUTPATIENT
Start: 2024-09-05 | End: 2024-09-10

## 2024-09-05 RX ORDER — TIOTROPIUM BROMIDE AND OLODATEROL 3.124; 2.736 UG/1; UG/1
2 SPRAY, METERED RESPIRATORY (INHALATION) DAILY
Refills: 0 | Status: DISCONTINUED | OUTPATIENT
Start: 2024-09-05 | End: 2024-09-10

## 2024-09-05 RX ORDER — OXYCODONE HYDROCHLORIDE 15 MG/1
150 TABLET ORAL ONCE
Refills: 0 | Status: COMPLETED | OUTPATIENT
Start: 2024-09-05 | End: 2024-09-05

## 2024-09-05 RX ORDER — OXYCODONE HYDROCHLORIDE 15 MG/1
0.5 TABLET ORAL
Qty: 450 | Refills: 0 | Status: DISCONTINUED | OUTPATIENT
Start: 2024-09-05 | End: 2024-09-06

## 2024-09-05 RX ORDER — MIDODRINE HYDROCHLORIDE 5 MG/1
5 TABLET ORAL THREE TIMES A DAY
Refills: 0 | Status: DISCONTINUED | OUTPATIENT
Start: 2024-09-05 | End: 2024-09-06

## 2024-09-05 RX ORDER — HEPARIN SODIUM,BOVINE 1000/ML
3500 VIAL (ML) INJECTION EVERY 6 HOURS
Refills: 0 | Status: DISCONTINUED | OUTPATIENT
Start: 2024-09-05 | End: 2024-09-05

## 2024-09-05 RX ORDER — ACETAMINOPHEN 325 MG/1
650 TABLET ORAL EVERY 6 HOURS
Refills: 0 | Status: DISCONTINUED | OUTPATIENT
Start: 2024-09-05 | End: 2024-09-10

## 2024-09-05 RX ORDER — DEXTROSE 15 G/33 G
12.5 GEL IN PACKET (GRAM) ORAL ONCE
Refills: 0 | Status: DISCONTINUED | OUTPATIENT
Start: 2024-09-05 | End: 2024-09-10

## 2024-09-05 RX ORDER — OXYCODONE HYDROCHLORIDE 15 MG/1
1 TABLET ORAL
Qty: 450 | Refills: 0 | Status: DISCONTINUED | OUTPATIENT
Start: 2024-09-05 | End: 2024-09-06

## 2024-09-05 RX ORDER — SUCRALFATE 1 G/10ML
1 SUSPENSION ORAL
Refills: 0 | Status: DISCONTINUED | OUTPATIENT
Start: 2024-09-05 | End: 2024-09-10

## 2024-09-05 RX ORDER — FERROUS SULFATE 325(65) MG
325 TABLET ORAL
Refills: 0 | Status: DISCONTINUED | OUTPATIENT
Start: 2024-09-05 | End: 2024-09-10

## 2024-09-05 RX ORDER — FOLIC ACID/MULTIVIT,IRON,MINER 0.4MG-18MG
1000 TABLET,CHEWABLE ORAL DAILY
Refills: 0 | Status: DISCONTINUED | OUTPATIENT
Start: 2024-09-05 | End: 2024-09-10

## 2024-09-05 RX ORDER — DILTIAZEM HYDROCHLORIDE 5 MG/ML
300 INJECTION INTRAVENOUS DAILY
Refills: 0 | Status: DISCONTINUED | OUTPATIENT
Start: 2024-09-05 | End: 2024-09-05

## 2024-09-05 RX ORDER — GLUCAGON INJECTION, SOLUTION 1 MG/.2ML
1 INJECTION, SOLUTION SUBCUTANEOUS ONCE
Refills: 0 | Status: DISCONTINUED | OUTPATIENT
Start: 2024-09-05 | End: 2024-09-10

## 2024-09-05 RX ORDER — HYDROMORPHONE HYDROCHLORIDE 2 MG/1
0.5 TABLET ORAL ONCE
Refills: 0 | Status: DISCONTINUED | OUTPATIENT
Start: 2024-09-05 | End: 2024-09-05

## 2024-09-05 RX ORDER — PANTOPRAZOLE SODIUM 40 MG
40 TABLET, DELAYED RELEASE (ENTERIC COATED) ORAL
Refills: 0 | Status: DISCONTINUED | OUTPATIENT
Start: 2024-09-05 | End: 2024-09-10

## 2024-09-05 RX ORDER — SODIUM CHLORIDE 9 MG/ML
1000 INJECTION INTRAMUSCULAR; INTRAVENOUS; SUBCUTANEOUS
Refills: 0 | Status: DISCONTINUED | OUTPATIENT
Start: 2024-09-05 | End: 2024-09-05

## 2024-09-05 RX ORDER — DILTIAZEM HYDROCHLORIDE 5 MG/ML
15 INJECTION INTRAVENOUS ONCE
Refills: 0 | Status: COMPLETED | OUTPATIENT
Start: 2024-09-05 | End: 2024-09-05

## 2024-09-05 RX ORDER — SODIUM CHLORIDE 9 MG/ML
1000 INJECTION INTRAMUSCULAR; INTRAVENOUS; SUBCUTANEOUS ONCE
Refills: 0 | Status: COMPLETED | OUTPATIENT
Start: 2024-09-05 | End: 2024-09-05

## 2024-09-05 RX ORDER — VANCOMYCIN/0.9 % SOD CHLORIDE 1.75G/25
1000 PLASTIC BAG, INJECTION (ML) INTRAVENOUS ONCE
Refills: 0 | Status: DISCONTINUED | OUTPATIENT
Start: 2024-09-05 | End: 2024-09-05

## 2024-09-05 RX ORDER — GABAPENTIN 100 MG
100 CAPSULE ORAL EVERY 12 HOURS
Refills: 0 | Status: DISCONTINUED | OUTPATIENT
Start: 2024-09-05 | End: 2024-09-10

## 2024-09-05 RX ORDER — DIGOXIN 0.12 MG/1
500 TABLET ORAL ONCE
Refills: 0 | Status: COMPLETED | OUTPATIENT
Start: 2024-09-05 | End: 2024-09-05

## 2024-09-05 RX ORDER — DEXTROSE 15 G/33 G
25 GEL IN PACKET (GRAM) ORAL ONCE
Refills: 0 | Status: DISCONTINUED | OUTPATIENT
Start: 2024-09-05 | End: 2024-09-10

## 2024-09-05 RX ORDER — OXYBUTYNIN CHLORIDE 5 MG/1
10 TABLET ORAL AT BEDTIME
Refills: 0 | Status: DISCONTINUED | OUTPATIENT
Start: 2024-09-05 | End: 2024-09-10

## 2024-09-05 RX ORDER — DEXTROSE 15 G/33 G
15 GEL IN PACKET (GRAM) ORAL ONCE
Refills: 0 | Status: DISCONTINUED | OUTPATIENT
Start: 2024-09-05 | End: 2024-09-10

## 2024-09-05 RX ORDER — NALOXEGOL OXALATE 25 MG/1
25 TABLET, FILM COATED ORAL DAILY
Refills: 0 | Status: DISCONTINUED | OUTPATIENT
Start: 2024-09-05 | End: 2024-09-10

## 2024-09-05 RX ORDER — ASCORBIC ACID/ASCORBATE SODIUM 500 MG
500 TABLET,CHEWABLE ORAL DAILY
Refills: 0 | Status: DISCONTINUED | OUTPATIENT
Start: 2024-09-05 | End: 2024-09-10

## 2024-09-05 RX ORDER — SODIUM CHLORIDE 0.65 %
1 AEROSOL, SPRAY (ML) NASAL DAILY
Refills: 0 | Status: DISCONTINUED | OUTPATIENT
Start: 2024-09-05 | End: 2024-09-10

## 2024-09-05 RX ORDER — AZTREONAM 500 MG
1000 VIAL (EA) INJECTION EVERY 6 HOURS
Refills: 0 | Status: DISCONTINUED | OUTPATIENT
Start: 2024-09-05 | End: 2024-09-09

## 2024-09-05 RX ORDER — POLYETHYLENE GLYCOL 3350 17 G/17G
17 POWDER, FOR SOLUTION ORAL DAILY
Refills: 0 | Status: DISCONTINUED | OUTPATIENT
Start: 2024-09-05 | End: 2024-09-10

## 2024-09-05 RX ORDER — HEPARIN SODIUM,BOVINE 1000/ML
7500 VIAL (ML) INJECTION EVERY 6 HOURS
Refills: 0 | Status: DISCONTINUED | OUTPATIENT
Start: 2024-09-05 | End: 2024-09-05

## 2024-09-05 RX ORDER — AZTREONAM 500 MG
2000 VIAL (EA) INJECTION ONCE
Refills: 0 | Status: COMPLETED | OUTPATIENT
Start: 2024-09-05 | End: 2024-09-05

## 2024-09-05 RX ORDER — VANCOMYCIN/0.9 % SOD CHLORIDE 1.75G/25
1250 PLASTIC BAG, INJECTION (ML) INTRAVENOUS ONCE
Refills: 0 | Status: COMPLETED | OUTPATIENT
Start: 2024-09-05 | End: 2024-09-05

## 2024-09-05 RX ORDER — METOPROLOL TARTRATE 100 MG/1
100 TABLET ORAL DAILY
Refills: 0 | Status: DISCONTINUED | OUTPATIENT
Start: 2024-09-05 | End: 2024-09-05

## 2024-09-05 RX ORDER — DILTIAZEM HYDROCHLORIDE 5 MG/ML
10 INJECTION INTRAVENOUS
Qty: 125 | Refills: 0 | Status: DISCONTINUED | OUTPATIENT
Start: 2024-09-05 | End: 2024-09-05

## 2024-09-05 RX ORDER — OXYBUTYNIN CHLORIDE 5 MG/1
10 TABLET ORAL DAILY
Refills: 0 | Status: DISCONTINUED | OUTPATIENT
Start: 2024-09-05 | End: 2024-09-05

## 2024-09-05 RX ORDER — SENNA 187 MG
2 TABLET ORAL AT BEDTIME
Refills: 0 | Status: DISCONTINUED | OUTPATIENT
Start: 2024-09-05 | End: 2024-09-10

## 2024-09-05 RX ORDER — HEPARIN SODIUM,BOVINE 1000/ML
VIAL (ML) INJECTION
Qty: 25000 | Refills: 0 | Status: DISCONTINUED | OUTPATIENT
Start: 2024-09-05 | End: 2024-09-05

## 2024-09-05 RX ORDER — CHLORHEXIDINE GLUCONATE 40 MG/ML
1 SOLUTION TOPICAL
Refills: 0 | Status: DISCONTINUED | OUTPATIENT
Start: 2024-09-05 | End: 2024-09-09

## 2024-09-05 RX ORDER — FAMOTIDINE 10 MG/ML
20 INJECTION INTRAVENOUS DAILY
Refills: 0 | Status: DISCONTINUED | OUTPATIENT
Start: 2024-09-05 | End: 2024-09-10

## 2024-09-05 RX ORDER — OXYCODONE HYDROCHLORIDE 15 MG/1
1 TABLET ORAL
Qty: 450 | Refills: 0 | Status: DISCONTINUED | OUTPATIENT
Start: 2024-09-05 | End: 2024-09-05

## 2024-09-05 RX ADMIN — DIGOXIN 500 MICROGRAM(S): 0.12 TABLET ORAL at 14:55

## 2024-09-05 RX ADMIN — Medication 325 MILLIGRAM(S): at 17:24

## 2024-09-05 RX ADMIN — SODIUM CHLORIDE 1000 MILLILITER(S): 9 INJECTION INTRAMUSCULAR; INTRAVENOUS; SUBCUTANEOUS at 10:34

## 2024-09-05 RX ADMIN — Medication 100 MILLILITER(S): at 19:49

## 2024-09-05 RX ADMIN — HYDROMORPHONE HYDROCHLORIDE 0.5 MILLIGRAM(S): 2 TABLET ORAL at 17:12

## 2024-09-05 RX ADMIN — OXYCODONE HYDROCHLORIDE 16.7 MG/MIN: 15 TABLET ORAL at 22:33

## 2024-09-05 RX ADMIN — OXYCODONE HYDROCHLORIDE 10 MILLIGRAM(S): 5 TABLET ORAL at 18:39

## 2024-09-05 RX ADMIN — OXYBUTYNIN CHLORIDE 10 MILLIGRAM(S): 5 TABLET ORAL at 22:18

## 2024-09-05 RX ADMIN — SODIUM CHLORIDE 75 MILLILITER(S): 9 INJECTION INTRAMUSCULAR; INTRAVENOUS; SUBCUTANEOUS at 16:32

## 2024-09-05 RX ADMIN — DILTIAZEM HYDROCHLORIDE 10 MG/HR: 5 INJECTION INTRAVENOUS at 11:49

## 2024-09-05 RX ADMIN — Medication 100 MILLIGRAM(S): at 17:24

## 2024-09-05 RX ADMIN — SODIUM CHLORIDE 1000 MILLILITER(S): 9 INJECTION INTRAMUSCULAR; INTRAVENOUS; SUBCUTANEOUS at 16:32

## 2024-09-05 RX ADMIN — Medication 50 MILLIGRAM(S): at 18:06

## 2024-09-05 RX ADMIN — Medication 2 TABLET(S): at 22:18

## 2024-09-05 RX ADMIN — Medication 2 MILLIGRAM(S): at 11:52

## 2024-09-05 RX ADMIN — Medication 100 MILLIGRAM(S): at 10:35

## 2024-09-05 RX ADMIN — OXYCODONE HYDROCHLORIDE 33.3 MG/MIN: 15 TABLET ORAL at 16:43

## 2024-09-05 RX ADMIN — OXYCODONE HYDROCHLORIDE 600 MILLIGRAM(S): 15 TABLET ORAL at 16:25

## 2024-09-05 RX ADMIN — Medication 40 MILLIGRAM(S): at 22:18

## 2024-09-05 RX ADMIN — OXYCODONE HYDROCHLORIDE 10 MILLIGRAM(S): 5 TABLET ORAL at 17:25

## 2024-09-05 RX ADMIN — Medication 2 MILLIGRAM(S): at 12:30

## 2024-09-05 RX ADMIN — SODIUM CHLORIDE 1000 MILLILITER(S): 9 INJECTION INTRAMUSCULAR; INTRAVENOUS; SUBCUTANEOUS at 14:58

## 2024-09-05 RX ADMIN — Medication 75 MILLILITER(S): at 15:40

## 2024-09-05 RX ADMIN — Medication 2000 MILLIGRAM(S): at 11:30

## 2024-09-05 RX ADMIN — HYDROMORPHONE HYDROCHLORIDE 0.5 MILLIGRAM(S): 2 TABLET ORAL at 16:36

## 2024-09-05 RX ADMIN — SODIUM CHLORIDE 1000 MILLILITER(S): 9 INJECTION INTRAMUSCULAR; INTRAVENOUS; SUBCUTANEOUS at 14:55

## 2024-09-05 RX ADMIN — SODIUM CHLORIDE 1000 MILLILITER(S): 9 INJECTION INTRAMUSCULAR; INTRAVENOUS; SUBCUTANEOUS at 12:10

## 2024-09-05 RX ADMIN — MIDODRINE HYDROCHLORIDE 5 MILLIGRAM(S): 5 TABLET ORAL at 17:24

## 2024-09-05 RX ADMIN — OXYCODONE HYDROCHLORIDE 33.3 MG/MIN: 15 TABLET ORAL at 16:41

## 2024-09-05 RX ADMIN — Medication 166.67 MILLIGRAM(S): at 11:30

## 2024-09-05 RX ADMIN — DILTIAZEM HYDROCHLORIDE 15 MILLIGRAM(S): 5 INJECTION INTRAVENOUS at 10:40

## 2024-09-05 RX ADMIN — Medication 1250 MILLIGRAM(S): at 12:30

## 2024-09-05 RX ADMIN — SUCRALFATE 1 GRAM(S): 1 SUSPENSION ORAL at 17:24

## 2024-09-05 NOTE — CONSULT NOTE ADULT - SUBJECTIVE AND OBJECTIVE BOX
Chief Complaint : Patient is a 54y old  Male who presents with a chief complaint of right heel wound.  HPI :  Patient is a 54y old  Male who presents with a chief complaint of right heel wound. He is well known the the wound care center and has been treating by Dr. Todd and Best in the past. He presents today with a malodorous right heel wound and sepsis. Patient understands that his condition will require immediate medical stabilization and surgical debridement today.        Patient admits to  (+) Fevers, (+) Chills, (+) Nausea, (-) Vomiting, (-) Shortness of Breath      PMH: No pertinent past medical history    Diabetes    No pertinent past medical history    Diabetes mellitus with no complication    Afib    Hypertension    BPH (benign prostatic hyperplasia)    Perforated gastric ulcer    Pulmonary embolism    History of non-ST elevation myocardial infarction (NSTEMI)    Osteomyelitis    CAD S/P percutaneous coronary angioplasty    Cerebrovascular accident      PSH:No significant past surgical history    No significant past surgical history    H/O abdominal surgery    Perforated gastric ulcer    Traumatic amputation of left foot, initial encounter        Allergies:fish (Hives)  ertapenem (Blisters; Rash)      Labs:                          8.3    19.41 )-----------( 513      ( 05 Sep 2024 10:35 )             27.3     WBC Trend  19.41<H> Date (09-05 @ 10:35)      Chem  09-05    139  |  107  |  22  ----------------------------<  73  4.3   |  23  |  1.20    Ca    8.0<L>      05 Sep 2024 10:25    TPro  5.6<L>  /  Alb  1.6<L>  /  TBili  0.6  /  DBili  x   /  AST  23  /  ALT  26  /  AlkPhos  182<H>  09-05          T(F): 99.9 (09-05-24 @ 09:50), Max: 99.9 (09-05-24 @ 09:50)  HR: 149 (09-05-24 @ 12:19) (126 - 153)  BP: 91/69 (09-05-24 @ 12:19) (66/49 - 136/68)  RR: 20 (09-05-24 @ 11:31) (20 - 26)  SpO2: 100% (09-05-24 @ 11:31) (96% - 100%)  Wt(kg): --    O:   General: Pleasant  male NAD & AOX3.    Integument:   Extansive ulceration of right heel with necrotic wound bed and periwound and severe malodor.   Vascular: Dorsalis Pedis and Posterior Tibial pulses palpable.  Capillary re-fill time wnl.   Neuro: Protective sensation intact.  MSK: Unable to assess.  Deformity:  A: Right foot ulceration     Chief Complaint : Patient is a 54y old  Male who presents with a chief complaint of right heel wound.  HPI :  Patient is a 54y old  Male who presents with a chief complaint of right heel wound. He is well known the the wound care center and has been treating by Dr. Todd and Best in the past. He presents today with a malodorous right heel wound and sepsis. Patient understands that his condition will require immediate medical stabilization and surgical debridement today.        Patient admits to  (+) Fevers, (+) Chills, (+) Nausea, (-) Vomiting, (-) Shortness of Breath      PMH: No pertinent past medical history    Diabetes    No pertinent past medical history    Diabetes mellitus with no complication    Afib    Hypertension    BPH (benign prostatic hyperplasia)    Perforated gastric ulcer    Pulmonary embolism    History of non-ST elevation myocardial infarction (NSTEMI)    Osteomyelitis    CAD S/P percutaneous coronary angioplasty    Cerebrovascular accident      PSH:No significant past surgical history    No significant past surgical history    H/O abdominal surgery    Perforated gastric ulcer    Traumatic amputation of left foot, initial encounter        Allergies:fish (Hives)  ertapenem (Blisters; Rash)      Labs:                          8.3    19.41 )-----------( 513      ( 05 Sep 2024 10:35 )             27.3     WBC Trend  19.41<H> Date (09-05 @ 10:35)      Chem  09-05    139  |  107  |  22  ----------------------------<  73  4.3   |  23  |  1.20    Ca    8.0<L>      05 Sep 2024 10:25    TPro  5.6<L>  /  Alb  1.6<L>  /  TBili  0.6  /  DBili  x   /  AST  23  /  ALT  26  /  AlkPhos  182<H>  09-05          T(F): 99.9 (09-05-24 @ 09:50), Max: 99.9 (09-05-24 @ 09:50)  HR: 149 (09-05-24 @ 12:19) (126 - 153)  BP: 91/69 (09-05-24 @ 12:19) (66/49 - 136/68)  RR: 20 (09-05-24 @ 11:31) (20 - 26)  SpO2: 100% (09-05-24 @ 11:31) (96% - 100%)  Wt(kg): --    O:   General: Pleasant  male NAD & AOX3.    Integument:   Extensive ulceration of right heel with necrotic wound bed and periwound and severe malodor.   Vascular: Dorsalis Pedis and Posterior Tibial pulses non  palpable.  Capillary re-fill time wnl.   Neuro: Protective sensation diminished to the heel but intact distally   MSK: Unable to assess.  Deformity:  A: Right foot ulceration

## 2024-09-05 NOTE — H&P ADULT - NSHPPHYSICALEXAM_GEN_ALL_CORE
· CONSTITUTIONAL: Well appearing, awake, alert, oriented to person, place, time/situation and in no apparent distress.  · EYES: - - -  · CARDIAC: - - -  · CARDIAC RHYTHM: IRREGULAR  · CARDIAC RATE: TACHYCARDIC  · CARDIAC SOUNDS: S1-S2  · RESPIRATORY: Breath sounds clear and equal bilaterally.  · MUSCULOSKELETAL: - - -  · MUSC PELVIS: L metatarsal amputation  · NEUROLOGICAL: Alert and oriented, no focal deficits, no motor or sensory deficits.

## 2024-09-05 NOTE — CONSULT NOTE ADULT - SUBJECTIVE AND OBJECTIVE BOX
[facility], Division of Infectious Diseases   DELGADO Rodriguez S. Shah, Y. Patel, G. Casimir  [phone]   weekends and after hours [phone]    SARAH MORRISON  54y, Male  [id number]    HPI--  HPI:      PMH/PSH--  No pertinent past medical history    Diabetes    No pertinent past medical history    Diabetes mellitus with no complication    Afib    Hypertension    BPH (benign prostatic hyperplasia)    Perforated gastric ulcer    Pulmonary embolism    History of non-ST elevation myocardial infarction (NSTEMI)    Osteomyelitis    CAD S/P percutaneous coronary angioplasty    Cerebrovascular accident    No significant past surgical history    No significant past surgical history    H/O abdominal surgery    Perforated gastric ulcer    Traumatic amputation of left foot, initial encounter        Allergies--  fish (Hives)      Medications--  Antibiotics: aztreonam  IVPB 1000 milliGRAM(s) IV Intermittent every 6 hours    Immunologic:   Other: acetaminophen     Tablet .. PRN  aMIOdarone Infusion  aMIOdarone Infusion  ascorbic acid  atorvastatin  cetirizine  cholecalciferol  dextrose 5%.  dextrose 5%.  dextrose 50% Injectable  dextrose 50% Injectable  dextrose 50% Injectable  dextrose Oral Gel  famotidine    Tablet  ferrous    sulfate  gabapentin  glucagon  Injectable  insulin lispro (ADMELOG) corrective regimen sliding scale  insulin lispro (ADMELOG) corrective regimen sliding scale  melatonin  midodrine.  multivitamin  naloxegol  oxybutynin XL  oxyCODONE    IR  pantoprazole    Tablet  polyethylene glycol 3350  senna  sodium chloride 0.65% Nasal PRN  sodium chloride 0.9%.  sucralfate  tiotropium 2.5 MICROgram(s)/olodaterol 2.5 MICROgram(s) (STIOLTO) Inhaler      Social History--  EtOH: denies ***  Tobacco: denies ***  Drug Use: denies ***    Family/Marital History--  No pertinent family history in first degree relatives    No pertinent family history in first degree relatives    FH: pulmonary embolism    FH: coronary artery disease    FH: stroke          Travel/Environmental/Occupational History:  *** inserth T/E/O Hx ***    Review of Systems:  REVIEW OF SYSTEMS  General: no fever, no chills, no wt loss	  Ophthalmologic: no blurry vision  Respiratory and Thorax: no cough, no dyspnea  Cardiovascular: no chest pain, no palpitations  Gastrointestinal:  no nausea, no vomiting, diarrhea  Genitourinary: no dysuria, no urgency, no frequency	  Musculoskeletal: no myalgias	  Neurological:  no headache	    Physical Exam--  Vital Signs: T(F): 98.9 (09-05-24 @ 16:19), Max: 99.9 (09-05-24 @ 09:50)  HR: 109 (09-05-24 @ 17:15)  BP: 87/65 (09-05-24 @ 17:15)  RR: 20 (09-05-24 @ 17:12)  SpO2: 100% (09-05-24 @ 17:12)  Wt(kg): --  General: Nontoxic-appearing Male in no acute distress.  HEENT: AT/NC. PERRL. EOMI. Anicteric. Conjunctiva pink and moist. Oropharynx clear. Dentition fair.  Neck: Not rigid. No sense of mass.  Nodes: None palpable.  Lungs: Clear bilaterally without rales, wheezing or rhonchi  Heart: Regular rate and rhythm. No Murmur. No rub. No gallop. No palpable thrill.  Abdomen: Bowel sounds present and normoactive. Soft. Nondistended. Nontender. No sense of mass. No organomegaly.  Back: No spinal tenderness. No costovertebral angle tenderness.   Extremities: No cyanosis or clubbing. No edema.   Skin: Warm. Dry. Good turgor. No rash. No vasculitic stigmata.  Psychiatric: Appropriate affect and mood for situation.         Laboratory & Imaging Data--  CBC                        8.3    19.41 )-----------( 513      ( 05 Sep 2024 10:35 )             27.3       Chemistries  09-05    139  |  107  |  22  ----------------------------<  73  4.3   |  23  |  1.20    Ca    8.0<L>      05 Sep 2024 10:25    TPro  5.6<L>  /  Alb  1.6<L>  /  TBili  0.6  /  DBili  x   /  AST  23  /  ALT  26  /  AlkPhos  182<H>  09-05      Culture Data           [facility], Division of Infectious Diseases   DELGADO Rodriguez S. Shah, Y. Patel, G. Casimir  [phone]   weekends and after hours [phone]    SARAH MORRISON  54y, Male  [id number]  HPI:  54m with atrial fib diabetes, cad hypertension pe , pad stroke, indewelling jacques  right heel ulcer presented to ED from wound care for right heel infection osteomyelitis  in ED complicated by hypotension rapid afib.  pt without cough, diarrhea, no vomiting, no fever, no abd pain,     PMH/PSH--  Diabetes  Diabetes mellitus with no complication  Afib  Hypertension  BPH (benign prostatic hyperplasia)  Perforated gastric ulcer  Pulmonary embolism  History of non-ST elevation myocardial infarction (NSTEMI)  Osteomyelitis  CAD S/P percutaneous coronary angioplasty  Cerebrovascular accident  H/O abdominal surgery  Perforated gastric ulcer  Traumatic amputation of left foot, initial encounter        Allergies--  fish (Hives)      Medications--  Antibiotics: aztreonam  IVPB 1000 milliGRAM(s) IV Intermittent every 6 hours    Immunologic:   Other: acetaminophen     Tablet .. PRN  aMIOdarone Infusion  aMIOdarone Infusion  ascorbic acid  atorvastatin  cetirizine  cholecalciferol  dextrose 5%.  dextrose 5%.  dextrose 50% Injectable  dextrose 50% Injectable  dextrose 50% Injectable  dextrose Oral Gel  famotidine    Tablet  ferrous    sulfate  gabapentin  glucagon  Injectable  insulin lispro (ADMELOG) corrective regimen sliding scale  insulin lispro (ADMELOG) corrective regimen sliding scale  melatonin  midodrine.  multivitamin  naloxegol  oxybutynin XL  oxyCODONE    IR  pantoprazole    Tablet  polyethylene glycol 3350  senna  sodium chloride 0.65% Nasal PRN  sodium chloride 0.9%.  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     Review of Systems:  REVIEW OF SYSTEMS  General: no fever, no chills, no wt loss	  Ophthalmologic: no blurry vision  Respiratory and Thorax: no cough, no dyspnea  Cardiovascular: no chest pain, no palpitations  Gastrointestinal:  no nausea, no vomiting, diarrhea  Genitourinary: no dysuria, no urgency, no frequency	  Musculoskeletal: no myalgias	  Neurological:  no headache	    Physical Exam--  Vital Signs: T(F): 98.9 (09-05-24 @ 16:19), Max: 99.9 (09-05-24 @ 09:50)  HR: 109 (09-05-24 @ 17:15)  BP: 87/65 (09-05-24 @ 17:15)  RR: 20 (09-05-24 @ 17:12)  SpO2: 100% (09-05-24 @ 17:12)  Wt(kg): --  General: Nontoxic-appearing Male in no acute distress.  HEENT: AT/NC. PERRL. EOMI. Anicteric. Conjunctiva pink and moist. Oropharynx clear. Dentition fair.  Neck: Not rigid. No sense of mass.  Nodes: None palpable.  Lungs: Clear bilaterally without rales, wheezing or rhonchi  Heart: S1s2  Abdomen: Bowel sounds present and normoactive. \.ss.   Extremities: No cyanosis or clubbing. left foot amputation  right heel ulcer pt refused exam states dont unwrap.  but foul smelling would yellowish green discharge on bandage  Skin: Warm. Dry. Good turgor. No rash. No vasculitic stigmata.  Psychiatric: Appropriate affect and mood for situation.   anita jacques        Laboratory & Imaging Data--  CBC                        8.3    19.41 )-----------( 513      ( 05 Sep 2024 10:35 )             27.3       Chemistries  09-05    139  |  107  |  22  ----------------------------<  73  4.3   |  23  |  1.20    Ca    8.0<L>      05 Sep 2024 10:25    TPro  5.6<L>  /  Alb  1.6<L>  /  TBili  0.6  /  DBili  x   /  AST  23  /  ALT  26  /  AlkPhos  182<H>  09-05      Culture Data

## 2024-09-05 NOTE — CONSULT NOTE ADULT - ATTENDING COMMENTS
Patient was seen at the [facility] and was noted to be hypotensive with malodorous wound to the right heel. Patient stated that he has not been eating and drinking well since about a week and a half. Patient's right heel wound was noted with necrotic tissue with exposed heel bone and purulent drainage. Performed sharp excisional wound debridement of the right heel and obtained wound culture.   Discussed with patient will need surgical intervention with debridement and bone biopsy of the right heel. Patient is not hemodynamically stable for surgical intervention at this time.   Will request medical and cardiac clearance prior to surgical intervention  Will continue local wound care and offloading at this time.
55 YO M with past medical history of AFib (on Eliquis), s/p cardiac arrest x 2 w/ AHRF (on recent admission), indwelling Aguilera, cerebral aneurysm, CAD (s/p stents), CVA, T2DM, HTN, OM (s/p debridement), PE, perforated gastric ulcer s/p ometnopexy 2019 with Dr. Mejia who presents with possible gangrenous right foot. In ED pt found to be in afib with RVR and hypotension.    - pt with septic shock from likely gangrenous foot.   - appears sig volume depleted  - agree with IVF  - now in rapid AF   - s/p Cardizem 15 mg IV x 1, briefly on cardizem gtt (stopped by ICU), s/p Digoxin 500 mcg IVP x 1  - RRT called in ED @ 1620 for afib with RVR and hypotension  - amiodarone gtt started for rate control. given pts hypotension can't use AV yung blockers, will use amio  - was given digoxin x 1, should complete load with 0.25 q6hrs x 2 doses to complete 1 mg dose  - if bp recovers would resume ccb or bb  - at this time may need to consider pressor support or midodrine for bp support.        - pt on eliquis at home, unsure if pt took home dose today.  - would start a/c with either heparin gtt in anticipation for procedure     - EKG nonischemic  - Will need for optimization of BP prior to OR. Sepsis likely driving AF w rvr. May need to consider pressor support and debridement if does not improve by am.     - spoke with ICU at length

## 2024-09-05 NOTE — ED PROVIDER NOTE - NSICDXFAMILYHX_GEN_ALL_CORE_FT
follow up with a primary care provider for further evaluation.     The radiology report is pending.  Call [phone] or [phone] for the radiology report result from the December 23, 2018, chest X-ray.      Mucinex to thin the phlegm.      Drink plenty of liquids      
FAMILY HISTORY:  FH: coronary artery disease, Father  FH: pulmonary embolism, Mother  FH: stroke, Father

## 2024-09-05 NOTE — ED ADULT NURSE NOTE - NSFALLHARMRISKINTERV_ED_ALL_ED
Assistance OOB with selected safe patient handling equipment if applicable/Assistance with ambulation/Communicate risk of Fall with Harm to all staff, patient, and family/Monitor gait and stability/Provide visual cue: red socks, yellow wristband, yellow gown, etc/Reinforce activity limits and safety measures with patient and family/Bed in lowest position, wheels locked, appropriate side rails in place/Call bell, personal items and telephone in reach/Instruct patient to call for assistance before getting out of bed/chair/stretcher/Non-slip footwear applied when patient is off stretcher/[place] to call system/Physically safe environment - no spills, clutter or unnecessary equipment/Purposeful Proactive Rounding/Room/bathroom lighting operational, light cord in reach

## 2024-09-05 NOTE — ED ADULT NURSE NOTE - OBJECTIVE STATEMENT
Pt brought in from wound care for code sepsis. Pt A&Ox4 noted to be hypotensive, tachycardiac and febrile. Pt c/o pain on his buttocks and right heel. Right foot noted to de dressed - would noted on right calf - foul smell noted. Wound noted to bt saccum - original dressing intact. Pt came in with sawyer. Pt states he is originally from [facility]. Pt states he has a hx of AFib. Pt denies any chest pain, SOB, n/v at this time. EKG done at bedside. Pt hard stick - multiple attempts to try and get IV access. Pt medicated per MD orders. Pt placed on bedside cardiac monitoring along with continuous O2 monitoring. Pt resting in stretcher.

## 2024-09-05 NOTE — CONSULT NOTE ADULT - SUBJECTIVE AND OBJECTIVE BOX
Patient is a 54y old  Male who presents with a chief complaint of hypotension    BRIEF HOSPITAL COURSE: 54 year old male with a PMH of afib on Eliquis, indwelling jacques, CAD s/p stents, CVA, DM2, HTN, R heel OM s/p debridement, PE who presented to the ED from wound care with hypotension.  On arrival to the ED patient was found to be in afib RVR and hypotensive.  Workup revealed a leukocytosis and a lactate of 2.7.  ICU consulted for further management.     ROS: Reports discomfort to R heel.  ROS otherwise negative.     PAST MEDICAL & SURGICAL HISTORY:  Diabetes      Diabetes mellitus with no complication      Afib      Hypertension      BPH (benign prostatic hyperplasia)      Perforated gastric ulcer  s/p emergent ex-lap omentopexy and plication 6/2019      Pulmonary embolism      History of non-ST elevation myocardial infarction (NSTEMI)      Osteomyelitis  s/p debridement      CAD S/P percutaneous coronary angioplasty      Cerebrovascular accident      H/O abdominal surgery      Perforated gastric ulcer      Traumatic amputation of left foot, initial encounter            Medications:    diltiazem Infusion 10 mG/Hr IV Continuous <Continuous>                  sodium chloride 0.9% Bolus 1000 milliLiter(s) IV Bolus once                ICU Vital Signs Last 24 Hrs  T(C): 37.7 (05 Sep 2024 09:50), Max: 37.7 (05 Sep 2024 09:50)  T(F): 99.9 (05 Sep 2024 09:50), Max: 99.9 (05 Sep 2024 09:50)  HR: 156 (05 Sep 2024 14:00) (126 - 156)  BP: 99/69 (05 Sep 2024 14:00) (66/49 - 136/68)  BP(mean): --  ABP: --  ABP(mean): --  RR: 20 (05 Sep 2024 11:31) (20 - 26)  SpO2: 100% (05 Sep 2024 11:31) (96% - 100%)    O2 Parameters below as of 05 Sep 2024 11:31  Patient On (Oxygen Delivery Method): room air                I&O's Detail        LABS:                        8.3    19.41 )-----------( 513      ( 05 Sep 2024 10:35 )             27.3     09-05    139  |  107  |  22  ----------------------------<  73  4.3   |  23  |  1.20    Ca    8.0<L>      05 Sep 2024 10:25    TPro  5.6<L>  /  Alb  1.6<L>  /  TBili  0.6  /  DBili  x   /  AST  23  /  ALT  26  /  AlkPhos  182<H>  09-05          CAPILLARY BLOOD GLUCOSE      POCT Blood Glucose.: 102 mg/dL (05 Sep 2024 09:55)    PT/INR - ( 05 Sep 2024 10:25 )   PT: 22.2 sec;   INR: 1.99 ratio         PTT - ( 05 Sep 2024 10:25 )  PTT:37.5 sec  Urinalysis Basic - ( 05 Sep 2024 10:25 )    Color: x / Appearance: x / SG: x / pH: x  Gluc: 73 mg/dL / Ketone: x  / Bili: x / Urobili: x   Blood: x / Protein: x / Nitrite: x   Leuk Esterase: x / RBC: x / WBC x   Sq Epi: x / Non Sq Epi: x / Bacteria: x      CULTURES:      Physical Examination:    General: No acute distress.      HEENT: Pupils equal, reactive to light.  Symmetric.    PULM: Clear to auscultation bilaterally, no significant sputum production    NECK: Supple, no lymphadenopathy, trachea midline    CVS: Regular rate and rhythm, no murmurs, rubs, or gallops    ABD: Soft, nondistended, nontender, normoactive bowel sounds, no masses    EXT: No edema, nontender    SKIN: Warm and well perfused, no rashes noted.    NEURO: Alert, oriented, interactive, nonfocal    DEVICES:     RADIOLOGY: ***    CRITICAL CARE TIME SPENT: ***   Patient is a 54y old  Male who presents with a chief complaint of hypotension    BRIEF HOSPITAL COURSE: 54 year old male with a PMH of afib on Eliquis, indwelling jacques, CAD s/p stents, CVA, DM2, HTN, R heel OM s/p debridement, PE who presented to the ED from wound care with hypotension.  On arrival to the ED patient was found to be in afib RVR and hypotensive.  Workup revealed a leukocytosis and a lactate of 2.7.  ICU consulted for further management.     ROS: Reports discomfort to R heel.  ROS otherwise negative.     PAST MEDICAL & SURGICAL HISTORY:  Diabetes  Diabetes mellitus with no complication  Afib  Hypertension  BPH (benign prostatic hyperplasia)  Perforated gastric ulcer  s/p emergent ex-lap omentopexy and plication 6/2019  Pulmonary embolism  History of non-ST elevation myocardial infarction (NSTEMI)  Osteomyelitis  s/p debridement  CAD S/P percutaneous coronary angioplasty  Cerebrovascular accident  H/O abdominal surgery  Perforated gastric ulcer  Traumatic amputation of left foot, initial encounter        Medications:  diltiazem Infusion 10 mG/Hr IV Continuous <Continuous>  sodium chloride 0.9% Bolus 1000 milliLiter(s) IV Bolus once        ICU Vital Signs Last 24 Hrs  T(C): 37.7 (05 Sep 2024 09:50), Max: 37.7 (05 Sep 2024 09:50)  T(F): 99.9 (05 Sep 2024 09:50), Max: 99.9 (05 Sep 2024 09:50)  HR: 156 (05 Sep 2024 14:00) (126 - 156)  BP: 99/69 (05 Sep 2024 14:00) (66/49 - 136/68)  BP(mean): --  ABP: --  ABP(mean): --  RR: 20 (05 Sep 2024 11:31) (20 - 26)  SpO2: 100% (05 Sep 2024 11:31) (96% - 100%)    O2 Parameters below as of 05 Sep 2024 11:31  Patient On (Oxygen Delivery Method): room air          I&O's Detail        LABS:                        8.3    19.41 )-----------( 513      ( 05 Sep 2024 10:35 )             27.3     09-05    139  |  107  |  22  ----------------------------<  73  4.3   |  23  |  1.20    Ca    8.0<L>      05 Sep 2024 10:25    TPro  5.6<L>  /  Alb  1.6<L>  /  TBili  0.6  /  DBili  x   /  AST  23  /  ALT  26  /  AlkPhos  182<H>  09-05          CAPILLARY BLOOD GLUCOSE      POCT Blood Glucose.: 102 mg/dL (05 Sep 2024 09:55)    PT/INR - ( 05 Sep 2024 10:25 )   PT: 22.2 sec;   INR: 1.99 ratio         PTT - ( 05 Sep 2024 10:25 )  PTT:37.5 sec  Urinalysis Basic - ( 05 Sep 2024 10:25 )    Color: x / Appearance: x / SG: x / pH: x  Gluc: 73 mg/dL / Ketone: x  / Bili: x / Urobili: x   Blood: x / Protein: x / Nitrite: x   Leuk Esterase: x / RBC: x / WBC x   Sq Epi: x / Non Sq Epi: x / Bacteria: x      CULTURES:      Physical Examination:    General: No acute distress.      PULM: Clear to auscultation bilaterally    CVS: Tachycardic, irregular     ABD: Soft, nondistended, nontender    EXT: L foot toes amputation, R heel wound with dressing in place     SKIN: Warm and well perfused, no rashes noted.    NEURO: Alert, oriented, interactive, nonfocal

## 2024-09-05 NOTE — ED PROVIDER NOTE - CARE PLAN
1 Principal Discharge DX:	Diabetic ulcer of left foot  Secondary Diagnosis:	Rapid atrial fibrillation

## 2024-09-05 NOTE — CHART NOTE - NSCHARTNOTEFT_GEN_A_CORE
Resident Rapid Response Note    Patient is a 54y old  Male who presents with a chief complaint of A fib with RVR and hypotension.    Patient was seen and examined at the bedside by the rapid response team and primary team with ICU PA at bedside.    Rapid Response Vital Signs:  BP: 80/64 --> 93/67  HR: 145  RR: 22  SpO2: 96% on 2LNC  FS: 104    Vital Signs Last 24 Hrs  T(C): 37.7 (05 Sep 2024 09:50), Max: 37.7 (05 Sep 2024 09:50)  T(F): 99.9 (05 Sep 2024 09:50), Max: 99.9 (05 Sep 2024 09:50)  HR: 145 (05 Sep 2024 16:08) (126 - 156)  BP: 80/64 (05 Sep 2024 16:08) (66/49 - 136/68)  BP(mean): --  RR: 19 (05 Sep 2024 14:56) (19 - 26)  SpO2: 100% (05 Sep 2024 14:56) (96% - 100%)    Parameters below as of 05 Sep 2024 14:56  Patient On (Oxygen Delivery Method): nasal cannula  O2 Flow (L/min): 3      Physical Exam:  General: Mild distress, 3LNC  HEENT: NCATl, moist mucous membranes   Neck: Supple, nontender, no mass  Neurology: A&Ox3, responding to questions appropriately    Respiratory: CTA B/L, No wheezing, rales, or rhonchi  CV: +tachycardic, irregularly irregular rhythm   Abdominal: Soft, nontender, non-distended, normoactive bowel sounds  Extremities: B/l calf tenderness. +L foot amputation all 5 digits. R foot with dressing saturated with serosanguinous fluid. Dry   MSK: No joint swelling       LABS:                        8.3    19.41 )-----------( 513      ( 05 Sep 2024 10:35 )             27.3     05 Sep 2024 10:25    139    |  107    |  22     ----------------------------<  73     4.3     |  23     |  1.20     Ca    8.0        05 Sep 2024 10:25    TPro  5.6    /  Alb  1.6    /  TBili  0.6    /  DBili  x      /  AST  23     /  ALT  26     /  AlkPhos  182    05 Sep 2024 10:25    PT/INR - ( 05 Sep 2024 10:25 )   PT: 22.2 sec;   INR: 1.99 ratio         PTT - ( 05 Sep 2024 10:25 )  PTT:37.5 sec  Urinalysis Basic - ( 05 Sep 2024 10:25 )    Color: x / Appearance: x / SG: x / pH: x  Gluc: 73 mg/dL / Ketone: x  / Bili: x / Urobili: x   Blood: x / Protein: x / Nitrite: x   Leuk Esterase: x / RBC: x / WBC x   Sq Epi: x / Non Sq Epi: x / Bacteria: x      CAPILLARY BLOOD GLUCOSE      POCT Blood Glucose.: 102 mg/dL (05 Sep 2024 09:55)        RADIOLOGY & ADDITIONAL TESTS:      Assessment/Plan:  53 YO M with past medical history of AFib (on Eliquis), s/p cardiac arrest x 2 w/ AHRF (on recent admission), indwelling Aguilera, cerebral aneurysm, CAD (s/p stents), CVA, T2DM, HTN, OM (s/p debridement), PE, perforated gastric ulcer s/p ometnopexy 2019 with Dr. Mejia who presents with possible gangrenous right foot.    Rapid response called for Afib with RVR and hypotension  -Pt BP was 80/64 initially then improved with IV fluids   -Given 0.5mg Digoxin   -Afterwards given amio drip per cardio rec  -Given 0.5mg Dilaudid for pain  -Discussed with ICU will monitor on telemetry. If HR does not improve will consider transfer to ICU  -Will continue to follow, RN to call if any changes. Resident Rapid Response Note    Patient is a 54y old  Male who presents with a chief complaint of A fib with RVR HR 150s and hypotension.    Patient was seen and examined at the bedside by the rapid response team and primary team with ICU PA at bedside. Patient currently only complaining of right sided LE pain. denies fever, chills, CP, palpitations, SOB, abd pain, n/v.    Rapid Response Vital Signs:  BP: 80/64 --> 93/67 s/p IVF and amiodarone  HR: 145  RR: 22  SpO2: 96% on 2LNC  FS: 104    Vital Signs Last 24 Hrs  T(C): 37.7 (05 Sep 2024 09:50), Max: 37.7 (05 Sep 2024 09:50)  T(F): 99.9 (05 Sep 2024 09:50), Max: 99.9 (05 Sep 2024 09:50)  HR: 145 (05 Sep 2024 16:08) (126 - 156)  BP: 80/64 (05 Sep 2024 16:08) (66/49 - 136/68)  BP(mean): --  RR: 19 (05 Sep 2024 14:56) (19 - 26)  SpO2: 100% (05 Sep 2024 14:56) (96% - 100%)    Parameters below as of 05 Sep 2024 14:56  Patient On (Oxygen Delivery Method): nasal cannula  O2 Flow (L/min): 3      Physical Exam:  General: Mild distress, 3LNC  HEENT: NCATl, moist mucous membranes   Neck: Supple, nontender, no mass  Neurology: A&Ox3, responding to questions appropriately    Respiratory: CTA B/L, No wheezing, rales, or rhonchi  CV: +tachycardic, irregularly irregular rhythm   Abdominal: Soft, nontender, non-distended, normoactive bowel sounds  Extremities: B/l calf tenderness. +L foot amputation all 5 digits. R foot with dressing saturated with serosanguinous fluid. Dry   MSK: No joint swelling       LABS:                        8.3    19.41 )-----------( 513      ( 05 Sep 2024 10:35 )             27.3     05 Sep 2024 10:25    139    |  107    |  22     ----------------------------<  73     4.3     |  23     |  1.20     Ca    8.0        05 Sep 2024 10:25    TPro  5.6    /  Alb  1.6    /  TBili  0.6    /  DBili  x      /  AST  23     /  ALT  26     /  AlkPhos  182    05 Sep 2024 10:25    PT/INR - ( 05 Sep 2024 10:25 )   PT: 22.2 sec;   INR: 1.99 ratio         PTT - ( 05 Sep 2024 10:25 )  PTT:37.5 sec  Urinalysis Basic - ( 05 Sep 2024 10:25 )    Color: x / Appearance: x / SG: x / pH: x  Gluc: 73 mg/dL / Ketone: x  / Bili: x / Urobili: x   Blood: x / Protein: x / Nitrite: x   Leuk Esterase: x / RBC: x / WBC x   Sq Epi: x / Non Sq Epi: x / Bacteria: x      CAPILLARY BLOOD GLUCOSE      POCT Blood Glucose.: 102 mg/dL (05 Sep 2024 09:55)        RADIOLOGY & ADDITIONAL TESTS:      Assessment/Plan:  53 YO M with past medical history of AFib (on Eliquis), s/p cardiac arrest x 2 w/ AHRF (on recent admission), indwelling Aguilera, cerebral aneurysm, CAD (s/p stents), CVA, T2DM, HTN, OM (s/p debridement), PE, perforated gastric ulcer s/p ometnopexy 2019 with Dr. Mejia who presents with possible gangrenous right foot.    Rapid response called for Afib with RVR and hypotension  -Pt BP was 80/64 initially then improved with IV fluids   -Given 0.5mg IVP Digoxin   -Afterwards started amio drip per cardio rec  -Given 0.5mg IVP Dilaudid  x1 for pain  -Discussed with ICU will monitor on telemetry. If HR does not improve or BP drops will consider transfer to ICU  -Will continue to follow, RN to call if any changes. Resident Rapid Response Note    Patient is a 54y old  Male who presents with a chief complaint of A fib with RVR HR 150s and hypotension.    Patient was seen and examined at the bedside by the rapid response team and primary team with ICU PA at bedside. Patient currently only complaining of right sided LE pain. denies fever, chills, CP, palpitations, SOB, abd pain, n/v.    Rapid Response Vital Signs:  BP: 80/64 --> 93/67 s/p IVF and amiodarone  HR: 145  RR: 22  SpO2: 96% on 2LNC  FS: 104    Vital Signs Last 24 Hrs  T(C): 37.7 (05 Sep 2024 09:50), Max: 37.7 (05 Sep 2024 09:50)  T(F): 99.9 (05 Sep 2024 09:50), Max: 99.9 (05 Sep 2024 09:50)  HR: 145 (05 Sep 2024 16:08) (126 - 156)  BP: 80/64 (05 Sep 2024 16:08) (66/49 - 136/68)  BP(mean): --  RR: 19 (05 Sep 2024 14:56) (19 - 26)  SpO2: 100% (05 Sep 2024 14:56) (96% - 100%)    Parameters below as of 05 Sep 2024 14:56  Patient On (Oxygen Delivery Method): nasal cannula  O2 Flow (L/min): 3      Physical Exam:  General: Mild distress, 3LNC  HEENT: NCATl, moist mucous membranes   Neck: Supple, nontender, no mass  Neurology: A&Ox3, responding to questions appropriately    Respiratory: CTA B/L, No wheezing, rales, or rhonchi  CV: +tachycardic, irregularly irregular rhythm   Abdominal: Soft, nontender, non-distended, normoactive bowel sounds  Extremities: B/l calf tenderness. +L foot amputation all 5 digits. R foot with dressing saturated with serosanguinous fluid. Dry   MSK: No joint swelling       LABS:                        8.3    19.41 )-----------( 513      ( 05 Sep 2024 10:35 )             27.3     05 Sep 2024 10:25    139    |  107    |  22     ----------------------------<  73     4.3     |  23     |  1.20     Ca    8.0        05 Sep 2024 10:25    TPro  5.6    /  Alb  1.6    /  TBili  0.6    /  DBili  x      /  AST  23     /  ALT  26     /  AlkPhos  182    05 Sep 2024 10:25    PT/INR - ( 05 Sep 2024 10:25 )   PT: 22.2 sec;   INR: 1.99 ratio         PTT - ( 05 Sep 2024 10:25 )  PTT:37.5 sec  Urinalysis Basic - ( 05 Sep 2024 10:25 )    Color: x / Appearance: x / SG: x / pH: x  Gluc: 73 mg/dL / Ketone: x  / Bili: x / Urobili: x   Blood: x / Protein: x / Nitrite: x   Leuk Esterase: x / RBC: x / WBC x   Sq Epi: x / Non Sq Epi: x / Bacteria: x      CAPILLARY BLOOD GLUCOSE      POCT Blood Glucose.: 102 mg/dL (05 Sep 2024 09:55)        RADIOLOGY & ADDITIONAL TESTS:      Assessment/Plan:  53 YO M with past medical history of AFib (on Eliquis), s/p cardiac arrest x 2 w/ AHRF (on recent admission), indwelling Aguilera, cerebral aneurysm, CAD (s/p stents), CVA, T2DM, HTN, OM (s/p debridement), PE, perforated gastric ulcer s/p ometnopexy 2019 with Dr. Mejia who presents with possible gangrenous right foot.    Rapid response called for Afib with RVR and hypotension likely due sepsis/right foot infection    -Pt BP was 80/64 initially then improved with IV fluids   -Given 0.5mg IVP Digoxin   -Afterwards started amio drip per cardio rec  -Given 0.5mg IVP Dilaudid  x1 for pain  - continue maintenance IVF  -Discussed with ICU will monitor on telemetry. If HR does not improve or BP drops will consider transfer to ICU  -Will continue to follow, RN to call if any changes. Resident Rapid Response Note    Patient is a 54y old  Male who presents with a chief complaint of A fib with RVR HR 150s and hypotension.    Patient was seen and examined at the bedside by the rapid response team and primary team with ICU PA at bedside. Patient currently only complaining of right sided LE pain. denies fever, chills, CP, palpitations, SOB, abd pain, n/v.    Rapid Response Vital Signs:  BP: 80/64 --> 93/67 s/p IVF and amiodarone  HR: 145  RR: 22  SpO2: 96% on 2LNC  FS: 104    Vital Signs Last 24 Hrs  T(C): 37.7 (05 Sep 2024 09:50), Max: 37.7 (05 Sep 2024 09:50)  T(F): 99.9 (05 Sep 2024 09:50), Max: 99.9 (05 Sep 2024 09:50)  HR: 145 (05 Sep 2024 16:08) (126 - 156)  BP: 80/64 (05 Sep 2024 16:08) (66/49 - 136/68)  BP(mean): --  RR: 19 (05 Sep 2024 14:56) (19 - 26)  SpO2: 100% (05 Sep 2024 14:56) (96% - 100%)    Parameters below as of 05 Sep 2024 14:56  Patient On (Oxygen Delivery Method): nasal cannula  O2 Flow (L/min): 3      Physical Exam:  General: Mild distress, 3LNC  HEENT: NCATl, moist mucous membranes   Neck: Supple, nontender, no mass  Neurology: A&Ox3, responding to questions appropriately    Respiratory: CTA B/L, No wheezing, rales, or rhonchi  CV: +tachycardic, irregularly irregular rhythm   Abdominal: Soft, nontender, non-distended, normoactive bowel sounds  Extremities: B/l calf tenderness. +L foot amputation all 5 digits. R foot with dressing saturated with serosanguinous fluid. Dry   MSK: No joint swelling       LABS:                        8.3    19.41 )-----------( 513      ( 05 Sep 2024 10:35 )             27.3     05 Sep 2024 10:25    139    |  107    |  22     ----------------------------<  73     4.3     |  23     |  1.20     Ca    8.0        05 Sep 2024 10:25    TPro  5.6    /  Alb  1.6    /  TBili  0.6    /  DBili  x      /  AST  23     /  ALT  26     /  AlkPhos  182    05 Sep 2024 10:25    PT/INR - ( 05 Sep 2024 10:25 )   PT: 22.2 sec;   INR: 1.99 ratio         PTT - ( 05 Sep 2024 10:25 )  PTT:37.5 sec  Urinalysis Basic - ( 05 Sep 2024 10:25 )    Color: x / Appearance: x / SG: x / pH: x  Gluc: 73 mg/dL / Ketone: x  / Bili: x / Urobili: x   Blood: x / Protein: x / Nitrite: x   Leuk Esterase: x / RBC: x / WBC x   Sq Epi: x / Non Sq Epi: x / Bacteria: x      CAPILLARY BLOOD GLUCOSE      POCT Blood Glucose.: 102 mg/dL (05 Sep 2024 09:55)        RADIOLOGY & ADDITIONAL TESTS:      Assessment/Plan:  55 YO M with past medical history of AFib (on Eliquis), s/p cardiac arrest x 2 w/ AHRF (on recent admission), indwelling Aguilera, cerebral aneurysm, CAD (s/p stents), CVA, T2DM, HTN, OM (s/p debridement), PE, perforated gastric ulcer s/p ometnopexy 2019 with Dr. Mejia who presents with possible gangrenous right foot.    Rapid response called for Afib with RVR and hypotension likely due sepsis/right foot infection    -Pt BP was 80/64 initially then improved with IV fluids   -Given 0.5mg IVP Digoxin   -Afterwards started amio drip per cardio rec  -Given 0.5mg IVP Dilaudid  x1 for pain  - continue maintenance IVF  -Discussed with ICU will monitor on telemetry. If HR does not improve or BP drops will consider transfer to ICU  -Will continue to follow, RN to call if any changes.              *****addendum - follow up in 2 hours. ****   Pt seen and examined at bedside this PM. Patient seen resting in bed, actively eating dinner and alert and oriented. Aware of Cardiology recommendations -on amio gtt and IVF. HR in 120-140s on monitor . Patient alert and oriented, eating dinner. Reports he feels well, no chest pain or shortness of breath. ICU is following, patient on aztreonam.       ROS:  CONSTITUTIONAL: No weakness, fevers or chills  RESPIRATORY: No cough, wheezing, hemoptysis; No shortness of breath  CARDIOVASCULAR: No chest pain or palpitations    PHYSICAL EXAM:  General: NAD  HEENT: moist mucous membranes   Neck: nontender  Neurology: AOx3, responsive    Respiratory: CTA B/L, No wheezes, rales, rhonchi  CV: irregularly irregular, +S1/S2, no murmurs, rubs or gallops  Abdominal: Soft, NT, ND       Vital Signs Last 24 Hrs  T(C): 37.2 (05 Sep 2024 16:19), Max: 37.7 (05 Sep 2024 09:50)  T(F): 98.9 (05 Sep 2024 16:19), Max: 99.9 (05 Sep 2024 09:50)  HR: 112 (05 Sep 2024 17:51) (92 - 156)  BP: 99/62 (05 Sep 2024 17:51) (66/49 - 136/68)  BP(mean): 70 (05 Sep 2024 17:15) (70 - 70)  RR: 20 (05 Sep 2024 17:12) (19 - 26)  SpO2: 100% (05 Sep 2024 17:12) (96% - 100%)    Parameters below as of 05 Sep 2024 17:12  Patient On (Oxygen Delivery Method): nasal cannula  O2 Flow (L/min): 2                              8.3    19.41 )-----------( 513      ( 05 Sep 2024 10:35 )             27.3     09-05    139  |  107  |  22  ----------------------------<  73  4.3   |  23  |  1.20    Ca    8.0<L>      05 Sep 2024 10:25    TPro  5.6<L>  /  Alb  1.6<L>  /  TBili  0.6  /  DBili  x   /  AST  23  /  ALT  26  /  AlkPhos  182<H>  09-05    PT/INR - ( 05 Sep 2024 10:25 )   PT: 22.2 sec;   INR: 1.99 ratio         PTT - ( 05 Sep 2024 10:25 )  PTT:37.5 sec  Urinalysis Basic - ( 05 Sep 2024 10:25 )    Color: x / Appearance: x / SG: x / pH: x  Gluc: 73 mg/dL / Ketone: x  / Bili: x / Urobili: x   Blood: x / Protein: x / Nitrite: x   Leuk Esterase: x / RBC: x / WBC x   Sq Epi: x / Non Sq Epi: x / Bacteria: x          A/P - pending  - patient seen with improved clinical status eating dinner and endorsing appetite per RN at bedside. HR now improved in the 120-140 range, moreso in the 120s.  - Continue amiodarone gtt per cardiology  - f/u repeat lactate results  - Repeat vitals: BP 90/68  RR 18 2L % T 98.9F   - RN notified to call if HR are sustained in the 140s and above or if trends continue to rise.  - ICU following, recs appreciate - called ICU to reassess  - Will continue to follow. Resident Rapid Response Note    Patient is a 54y old  Male who presents with a chief complaint of A fib with RVR HR 150s and hypotension.    Patient was seen and examined at the bedside by the rapid response team and primary team with ICU PA at bedside. Patient currently only complaining of right sided LE pain. denies fever, chills, CP, palpitations, SOB, abd pain, n/v.    Rapid Response Vital Signs:  BP: 80/64 --> 93/67 s/p IVF and amiodarone  HR: 145  RR: 22  SpO2: 96% on 2LNC  FS: 104    Vital Signs Last 24 Hrs  T(C): 37.7 (05 Sep 2024 09:50), Max: 37.7 (05 Sep 2024 09:50)  T(F): 99.9 (05 Sep 2024 09:50), Max: 99.9 (05 Sep 2024 09:50)  HR: 145 (05 Sep 2024 16:08) (126 - 156)  BP: 80/64 (05 Sep 2024 16:08) (66/49 - 136/68)  BP(mean): --  RR: 19 (05 Sep 2024 14:56) (19 - 26)  SpO2: 100% (05 Sep 2024 14:56) (96% - 100%)    Parameters below as of 05 Sep 2024 14:56  Patient On (Oxygen Delivery Method): nasal cannula  O2 Flow (L/min): 3      Physical Exam:  General: Mild distress, 3LNC  HEENT: NCATl, moist mucous membranes   Neck: Supple, nontender, no mass  Neurology: A&Ox3, responding to questions appropriately    Respiratory: CTA B/L, No wheezing, rales, or rhonchi  CV: +tachycardic, irregularly irregular rhythm   Abdominal: Soft, nontender, non-distended, normoactive bowel sounds  Extremities: B/l calf tenderness. +L foot amputation all 5 digits. R foot with dressing saturated with serosanguinous fluid. Dry   MSK: No joint swelling       LABS:                        8.3    19.41 )-----------( 513      ( 05 Sep 2024 10:35 )             27.3     05 Sep 2024 10:25    139    |  107    |  22     ----------------------------<  73     4.3     |  23     |  1.20     Ca    8.0        05 Sep 2024 10:25    TPro  5.6    /  Alb  1.6    /  TBili  0.6    /  DBili  x      /  AST  23     /  ALT  26     /  AlkPhos  182    05 Sep 2024 10:25    PT/INR - ( 05 Sep 2024 10:25 )   PT: 22.2 sec;   INR: 1.99 ratio         PTT - ( 05 Sep 2024 10:25 )  PTT:37.5 sec  Urinalysis Basic - ( 05 Sep 2024 10:25 )    Color: x / Appearance: x / SG: x / pH: x  Gluc: 73 mg/dL / Ketone: x  / Bili: x / Urobili: x   Blood: x / Protein: x / Nitrite: x   Leuk Esterase: x / RBC: x / WBC x   Sq Epi: x / Non Sq Epi: x / Bacteria: x      CAPILLARY BLOOD GLUCOSE      POCT Blood Glucose.: 102 mg/dL (05 Sep 2024 09:55)        RADIOLOGY & ADDITIONAL TESTS:      Assessment/Plan:  55 YO M with past medical history of AFib (on Eliquis), s/p cardiac arrest x 2 w/ AHRF (on recent admission), indwelling Aguilera, cerebral aneurysm, CAD (s/p stents), CVA, T2DM, HTN, OM (s/p debridement), PE, perforated gastric ulcer s/p ometnopexy 2019 with Dr. Mejia who presents with possible gangrenous right foot.    Rapid response called for Afib with RVR and hypotension likely due sepsis/right foot infection    -Pt BP was 80/64 initially then improved with IV fluids   -Given 0.5mg IVP Digoxin   -Afterwards started amio drip per cardio rec  -Given 0.5mg IVP Dilaudid  x1 for pain  - continue maintenance IVF  -Discussed with ICU will monitor on telemetry. If HR does not improve or BP drops will consider transfer to ICU  -Will continue to follow, RN to call if any changes.              *****addendum - follow up in 2 hours. ****   Pt seen and examined at bedside this PM. Patient seen resting in bed, actively eating dinner and alert and oriented. Aware of Cardiology recommendations -on amio gtt and IVF. HR in 120-140s on monitor . Patient alert and oriented, eating dinner. Reports he feels well, no chest pain or shortness of breath. ICU is following, patient on aztreonam.       ROS:  CONSTITUTIONAL: No weakness, fevers or chills  RESPIRATORY: No cough, wheezing, hemoptysis; No shortness of breath  CARDIOVASCULAR: No chest pain or palpitations    PHYSICAL EXAM:  General: NAD  HEENT: moist mucous membranes   Neck: nontender  Neurology: AOx3, responsive    Respiratory: CTA B/L, No wheezes, rales, rhonchi  CV: irregularly irregular, +S1/S2, no murmurs, rubs or gallops  Abdominal: Soft, NT, ND       Vital Signs Last 24 Hrs  T(C): 37.2 (05 Sep 2024 16:19), Max: 37.7 (05 Sep 2024 09:50)  T(F): 98.9 (05 Sep 2024 16:19), Max: 99.9 (05 Sep 2024 09:50)  HR: 112 (05 Sep 2024 17:51) (92 - 156)  BP: 99/62 (05 Sep 2024 17:51) (66/49 - 136/68)  BP(mean): 70 (05 Sep 2024 17:15) (70 - 70)  RR: 20 (05 Sep 2024 17:12) (19 - 26)  SpO2: 100% (05 Sep 2024 17:12) (96% - 100%)    Parameters below as of 05 Sep 2024 17:12  Patient On (Oxygen Delivery Method): nasal cannula  O2 Flow (L/min): 2                              8.3    19.41 )-----------( 513      ( 05 Sep 2024 10:35 )             27.3     09-05    139  |  107  |  22  ----------------------------<  73  4.3   |  23  |  1.20    Ca    8.0<L>      05 Sep 2024 10:25    TPro  5.6<L>  /  Alb  1.6<L>  /  TBili  0.6  /  DBili  x   /  AST  23  /  ALT  26  /  AlkPhos  182<H>  09-05    PT/INR - ( 05 Sep 2024 10:25 )   PT: 22.2 sec;   INR: 1.99 ratio         PTT - ( 05 Sep 2024 10:25 )  PTT:37.5 sec  Urinalysis Basic - ( 05 Sep 2024 10:25 )    Color: x / Appearance: x / SG: x / pH: x  Gluc: 73 mg/dL / Ketone: x  / Bili: x / Urobili: x   Blood: x / Protein: x / Nitrite: x   Leuk Esterase: x / RBC: x / WBC x   Sq Epi: x / Non Sq Epi: x / Bacteria: x          A/P  - patient seen with improved clinical status eating dinner and endorsing appetite per RN at bedside. HR now improved in the 120-140 range, moreso in the 120s.  - Continue amiodarone gtt per cardiology  - f/u repeat lactate results  - Repeat vitals: BP 90/68  RR 18 2L % T 98.9F   - RN notified to call if HR are sustained in the 140s and above or if trends continue to rise.  - ICU following, recs appreciate - called ICU to reassess - ICU PA aware.  - Will continue to follow. Resident Rapid Response Note    Patient is a 54y old  Male who presents with a chief complaint of A fib with RVR HR 150s and hypotension.    Patient was seen and examined at the bedside by the rapid response team and primary team with ICU PA at bedside. Patient currently only complaining of right sided LE pain. denies fever, chills, CP, palpitations, SOB, abd pain, n/v.    Rapid Response Vital Signs:  BP: 80/64 --> 93/67 s/p IVF and amiodarone  HR: 145  RR: 22  SpO2: 96% on 2LNC  FS: 104    Vital Signs Last 24 Hrs  T(C): 37.7 (05 Sep 2024 09:50), Max: 37.7 (05 Sep 2024 09:50)  T(F): 99.9 (05 Sep 2024 09:50), Max: 99.9 (05 Sep 2024 09:50)  HR: 145 (05 Sep 2024 16:08) (126 - 156)  BP: 80/64 (05 Sep 2024 16:08) (66/49 - 136/68)  BP(mean): --  RR: 19 (05 Sep 2024 14:56) (19 - 26)  SpO2: 100% (05 Sep 2024 14:56) (96% - 100%)    Parameters below as of 05 Sep 2024 14:56  Patient On (Oxygen Delivery Method): nasal cannula  O2 Flow (L/min): 3      Physical Exam:  General: Mild distress, 3LNC  HEENT: NCATl, moist mucous membranes   Neck: Supple, nontender, no mass  Neurology: A&Ox3, responding to questions appropriately    Respiratory: CTA B/L, No wheezing, rales, or rhonchi  CV: +tachycardic, irregularly irregular rhythm   Abdominal: Soft, nontender, non-distended, normoactive bowel sounds  Extremities: B/l calf tenderness. +L foot amputation all 5 digits. R foot with dressing saturated with serosanguinous fluid. Dry   MSK: No joint swelling       LABS:                        8.3    19.41 )-----------( 513      ( 05 Sep 2024 10:35 )             27.3     05 Sep 2024 10:25    139    |  107    |  22     ----------------------------<  73     4.3     |  23     |  1.20     Ca    8.0        05 Sep 2024 10:25    TPro  5.6    /  Alb  1.6    /  TBili  0.6    /  DBili  x      /  AST  23     /  ALT  26     /  AlkPhos  182    05 Sep 2024 10:25    PT/INR - ( 05 Sep 2024 10:25 )   PT: 22.2 sec;   INR: 1.99 ratio         PTT - ( 05 Sep 2024 10:25 )  PTT:37.5 sec  Urinalysis Basic - ( 05 Sep 2024 10:25 )    Color: x / Appearance: x / SG: x / pH: x  Gluc: 73 mg/dL / Ketone: x  / Bili: x / Urobili: x   Blood: x / Protein: x / Nitrite: x   Leuk Esterase: x / RBC: x / WBC x   Sq Epi: x / Non Sq Epi: x / Bacteria: x      CAPILLARY BLOOD GLUCOSE      POCT Blood Glucose.: 102 mg/dL (05 Sep 2024 09:55)        RADIOLOGY & ADDITIONAL TESTS:      Assessment/Plan:  53 YO M with past medical history of AFib (on Eliquis), s/p cardiac arrest x 2 w/ AHRF (on recent admission), indwelling Aguilera, cerebral aneurysm, CAD (s/p stents), CVA, T2DM, HTN, OM (s/p debridement), PE, perforated gastric ulcer s/p ometnopexy 2019 with Dr. Mejia who presents with possible gangrenous right foot.    Rapid response called for Afib with RVR and hypotension likely due sepsis/right foot infection    -Pt BP was 80/64 initially then improved with IV fluids   -Given 0.5mg IVP Digoxin   -Afterwards started amio drip per cardio rec  -Given 0.5mg IVP Dilaudid  x1 for pain  - continue maintenance IVF  -Discussed with ICU will monitor on telemetry. If HR does not improve or BP drops will consider transfer to ICU  -Will continue to follow, RN to call if any changes.              *****addendum - follow up in 2 hours. ****   Pt seen and examined at bedside this PM. Patient seen resting in bed, actively eating dinner and alert and oriented. Aware of Cardiology recommendations -on amio gtt and IVF. HR in 120-140s on monitor . Patient alert and oriented, eating dinner. Reports he feels well, no chest pain or shortness of breath. ICU is following, patient on aztreonam.       ROS:  CONSTITUTIONAL: No weakness, fevers or chills  RESPIRATORY: No cough, wheezing, hemoptysis; No shortness of breath  CARDIOVASCULAR: No chest pain or palpitations    PHYSICAL EXAM:  General: NAD  HEENT: moist mucous membranes   Neck: nontender  Neurology: AOx3, responsive    Respiratory: CTA B/L, No wheezes, rales, rhonchi  CV: irregularly irregular, +S1/S2, no murmurs, rubs or gallops  Abdominal: Soft, NT, ND       Vital Signs Last 24 Hrs  T(C): 37.2 (05 Sep 2024 16:19), Max: 37.7 (05 Sep 2024 09:50)  T(F): 98.9 (05 Sep 2024 16:19), Max: 99.9 (05 Sep 2024 09:50)  HR: 112 (05 Sep 2024 17:51) (92 - 156)  BP: 99/62 (05 Sep 2024 17:51) (66/49 - 136/68)  BP(mean): 70 (05 Sep 2024 17:15) (70 - 70)  RR: 20 (05 Sep 2024 17:12) (19 - 26)  SpO2: 100% (05 Sep 2024 17:12) (96% - 100%)    Parameters below as of 05 Sep 2024 17:12  Patient On (Oxygen Delivery Method): nasal cannula  O2 Flow (L/min): 2                              8.3    19.41 )-----------( 513      ( 05 Sep 2024 10:35 )             27.3     09-05    139  |  107  |  22  ----------------------------<  73  4.3   |  23  |  1.20    Ca    8.0<L>      05 Sep 2024 10:25    TPro  5.6<L>  /  Alb  1.6<L>  /  TBili  0.6  /  DBili  x   /  AST  23  /  ALT  26  /  AlkPhos  182<H>  09-05    PT/INR - ( 05 Sep 2024 10:25 )   PT: 22.2 sec;   INR: 1.99 ratio         PTT - ( 05 Sep 2024 10:25 )  PTT:37.5 sec  Urinalysis Basic - ( 05 Sep 2024 10:25 )    Color: x / Appearance: x / SG: x / pH: x  Gluc: 73 mg/dL / Ketone: x  / Bili: x / Urobili: x   Blood: x / Protein: x / Nitrite: x   Leuk Esterase: x / RBC: x / WBC x   Sq Epi: x / Non Sq Epi: x / Bacteria: x          A/P  - patient seen with improved clinical status eating dinner and endorsing appetite per RN at bedside. HR now improved in the 120-140 range, moreso in the 120s.  - Continue amiodarone gtt per cardiology  - f/u repeat lactate results  - Repeat vitals: BP 90/68  RR 18 2L % T 98.9F   - RN notified to call if HR are sustained in the 140s and above or if trends continue to rise.  - ICU following, recs appreciate - called ICU to reassess - ICU PA aware.  - Will continue to follow.        ****addendum****  - Called by RN, patient continues to be on amiodarone gtt. HR in 110-120s with occasional runs to the 130s. SBP ~89-90s/60s.  - To follow amiodarone gtt protocol and continue infusion. States patient is stable at this time no sx.  - ICU PA is aware, following  - RN notified to call for changes.

## 2024-09-05 NOTE — ED PROVIDER NOTE - RELIEVING FACTORS
[FreeTextEntry1] : Will evaluate with Chest Xray given duration of cough.  No wheezing on exam. \par Advised trial of flonase for nasal symptoms. \par Discussed HSV testing-no active lesions or known history. \par \par Healthy diet discussed.  Will recheck cholesterol in 3 months.  none

## 2024-09-05 NOTE — CONSULT NOTE ADULT - ASSESSMENT
54 year old male with a PMH of afib on Eliquis, indwelling jacques, CAD s/p stents, CVA, DM2, HTN, R heel OM s/p debridement, PE who presented to the ED from wound care with hypotension    Problem list:  Hypotension  Afib RVR  Sepsis   R heel ulcer     -On evaluation patient in afib RVR with rates in the 140s-150s.  BP 92/65 with a MAP of 76  -He received 3L NS as well as 15 mg IVP Cardizem and was placed on a Cardizem drip  -Cardizem infusing at 20 mL/hr and persistently tachycardic  -Suspect that sepsis is driving the afib RVR.  Would continue with IVF resuscitation  -Patient mentating appropriately and maintaining a MAP >65.  He denies systemic complaints  -Would d/c Cardizem drip as does not appear to be achieving rate control.  Will give a one time dose of digoxin to see if this helps  -Patient is anticoagulated w/ Eliquis   -Received Azactam and vanco in the ED.  Prior cultures have grown MRSA, citrobacter, Klebsiella, and Providencia.  Would continue with broad spectrum abx coverage.  Would obtain ID consult for further recs  -Podiatry is following with plans for eventual debridement in OR when medically optimized    At this time patient does require ICU level of care as he is mentating well and not in shock.  If his clinical condition changes or hemodynamics do not improve (he remains not rate controlled) please re-consult.    Case discussed w/ ICU attending Dr. Li and cardiology Dr. Jacobs.    Time spent on this patient encounter, which includes documenting this note in the electronic medical record, was 82 minutes including assessing the presenting problems with associated risks, reviewing the medical record to prepare for the encounter, and meeting face to face with patient to obtain additional history. I have also performed an appropriate physical exam, made interventions listed and ordered and interpreted appropriate diagnostic studies as documented. To improve communication and patient safety, I have coordinated care with the multidisciplinary team including the bedside nurse, appropriate attending of record and consultants as needed.  Date of entry of this note is equal to the date of services rendered.    54 year old male with a PMH of afib on Eliquis, indwelling jacques, CAD s/p stents, CVA, DM2, HTN, R heel OM s/p debridement, PE who presented to the ED from wound care with hypotension    Problem list:  Hypotension  Afib RVR  Sepsis   R heel ulcer     -On evaluation patient in afib RVR with rates in the 140s-150s.  BP 92/65 with a MAP of 76  -He received 3L NS as well as 15 mg IVP Cardizem and was placed on a Cardizem drip  -Cardizem infusing at 20 mL/hr and persistently tachycardic  -Suspect that sepsis is driving the afib RVR.  Would continue with IVF resuscitation  -Would repeat lactate to ensure clearance   -Patient mentating appropriately and maintaining a MAP >65.  He denies systemic complaints  -Would d/c Cardizem drip as does not appear to be achieving rate control.  Will give a one time dose of digoxin to see if this helps  -Patient is anticoagulated w/ Eliquis   -Received Azactam and vanco in the ED.  Prior cultures have grown MRSA, citrobacter, Klebsiella, and Providencia.  Would continue with broad spectrum abx coverage.  Would obtain ID consult for further recs  -Podiatry is following with plans for eventual debridement in OR when medically optimized    At this time patient does require ICU level of care as he is mentating well and not in shock.  If his clinical condition changes or hemodynamics do not improve (he remains not rate controlled) please re-consult.    Case discussed w/ ICU attending Dr. Li and cardiology Dr. Jacobs.    Time spent on this patient encounter, which includes documenting this note in the electronic medical record, was 82 minutes including assessing the presenting problems with associated risks, reviewing the medical record to prepare for the encounter, and meeting face to face with patient to obtain additional history. I have also performed an appropriate physical exam, made interventions listed and ordered and interpreted appropriate diagnostic studies as documented. To improve communication and patient safety, I have coordinated care with the multidisciplinary team including the bedside nurse, appropriate attending of record and consultants as needed.  Date of entry of this note is equal to the date of services rendered.

## 2024-09-05 NOTE — CONSULT NOTE ADULT - ASSESSMENT
Assessment:     Plan:   - Patient without symptoms of angina or heart failure at this time.  - No clear evidence of acute ischemia, trops negative x 2. Will follow up third set.  - His CKs are flat, suggesting against acute atherosclerotic plaque rupture.  - Biomarker trend is not consistent with plaque rupture but rather demand ischemia. Monitor closely for the development of anginal symptoms or clinical signs of ischemia.   - No acute changes on EKG compared to previous.    - No meaningful evidence of volume overload.  - Previous TTE shows ___.  - Strict I/Os, daily weights.    - BP well controlled, monitor routine hemodynamics.  - Continue ___.    - Monitor and replete lytes, keep K>4, Mg>2.  - Pt has no active ischemia, decompensated heart failure, unstable arrythmia, or severe stenotic valvular disease, and has __ cardiac risk factors. In the setting of low risk ___, he/she is optimized from cardiovascular standpoint to proceed with planned procedure with routine hemodynamic monitoring.   - Pt has no modifiable active cardiac risk factors and in the setting of low risk _____, patient is optimized as best as possible from cardiovascular standpoint to proceed with planned procedure with routine hemodynamic monitoring.    - Other cardiovascular workup will depend on clinical course.  - All other workup per primary team.  - Will continue to follow.             Assessment:   53 YO M with past medical history of AFib (on Eliquis), s/p cardiac arrest x 2 w/ AHRF (on recent admission), indwelling Aguilera, cerebral aneurysm, CAD (s/p stents), CVA, T2DM, HTN, OM (s/p debridement), PE, perforated gastric ulcer s/p ometnopexy 2019 with Dr. Mejia who presents with possible gangrenous right foot. In ED pt found to be in afib with RVR.     Plan:   - Patient without symptoms of angina or heart failure at this time.  - No clear evidence of acute ischemia, trops negative x 2. Will follow up third set.  - His CKs are flat, suggesting against acute atherosclerotic plaque rupture.  - Biomarker trend is not consistent with plaque rupture but rather demand ischemia. Monitor closely for the development of anginal symptoms or clinical signs of ischemia.   - No acute changes on EKG compared to previous.    - No meaningful evidence of volume overload.  - Previous TTE shows ___.  - Strict I/Os, daily weights.    - BP well controlled, monitor routine hemodynamics.  - Continue ___.    - Monitor and replete lytes, keep K>4, Mg>2.  - Pt has no active ischemia, decompensated heart failure, unstable arrythmia, or severe stenotic valvular disease, and has __ cardiac risk factors. In the setting of low risk ___, he/she is optimized from cardiovascular standpoint to proceed with planned procedure with routine hemodynamic monitoring.   - Pt has no modifiable active cardiac risk factors and in the setting of low risk _____, patient is optimized as best as possible from cardiovascular standpoint to proceed with planned procedure with routine hemodynamic monitoring.    - Other cardiovascular workup will depend on clinical course.  - All other workup per primary team.  - Will continue to follow.             Assessment:   53 YO M with past medical history of AFib (on Eliquis), s/p cardiac arrest x 2 w/ AHRF (on recent admission), indwelling Aguilera, cerebral aneurysm, CAD (s/p stents), CVA, T2DM, HTN, OM (s/p debridement), PE, perforated gastric ulcer s/p ometnopexy 2019 with Dr. Mejia who presents with possible gangrenous right foot. In ED pt found to be in afib with RVR and hypotension.    Plan:   #afib with RVR  - s/p Cardizem 15 mg IVP x 1 @ 09:43  - s/p Digoxin 500 mcg IVP x 1 @ 14:43  - s/p 1L IVF bolus x 4  - RRT called in ED @ 1620 secondary to afib with RVR and hypotension, amio gtt started   - c/w home ASA 81 mg qd  - on home eliquis 5 mg bid  - on home cardizem  mg qd  - on home metoprolol 100 mg ER q12hrs    - Patient without symptoms of angina or heart failure at this time.  - No acute changes on EKG compared to previous.    - No meaningful evidence of volume overload.  - Echo from 6/14 showed TTE showed normal EF and mod-severe MR.   - Strict I/Os, daily weights.    - hypotensive on presentation to ED, monitor routine hemodynamics.  - s/p IVF bolus x 4.    - Monitor and replete lytes, keep K>4, Mg>2.    - Other cardiovascular workup will depend on clinical course.  - All other workup per primary team.  - Will continue to follow.             Assessment:   55 YO M with past medical history of AFib (on Eliquis), s/p cardiac arrest x 2 w/ AHRF (on recent admission), indwelling Aguilera, cerebral aneurysm, CAD (s/p stents), CVA, T2DM, HTN, OM (s/p debridement), PE, perforated gastric ulcer s/p ometnopexy 2019 with Dr. Mejia who presents with possible gangrenous right foot. In ED pt found to be in afib with RVR and hypotension.    Plan:   #afib with RVR  - s/p Cardizem 15 mg IVP x 1 @ 09:43  - s/p Digoxin 500 mcg IVP x 1 @ 14:43  - s/p 1L IVF bolus x 4,   - RRT called in ED @ 1620 secondary to afib with RVR and hypotension, amio gtt started   - c/w home ASA 81 mg qd  - on home eliquis 5 mg bid  - on home cardizem  mg qd  - on home metoprolol 100 mg ER q12hrs    - Patient without symptoms of angina or heart failure at this time.  - No acute changes on EKG compared to previous, shows afib with RVR    - No meaningful evidence of volume overload.  - Echo from 6/14 showed TTE showed normal EF and mod-severe MR.   - Strict I/Os, daily weights.    - hypotensive on presentation to ED, monitor routine hemodynamics.  - s/p IVF bolus x 4.    - Monitor and replete lytes, keep K>4, Mg>2.    - Other cardiovascular workup will depend on clinical course.  - All other workup per primary team.  - Will continue to follow.             Assessment:   55 YO M with past medical history of AFib (on Eliquis), s/p cardiac arrest x 2 w/ AHRF (on recent admission), indwelling Aguilera, cerebral aneurysm, CAD (s/p stents), CVA, T2DM, HTN, OM (s/p debridement), PE, perforated gastric ulcer s/p ometnopexy 2019 with Dr. Mejia who presents with possible gangrenous right foot. In ED pt found to be in afib with RVR and hypotension.    Plan:   #afib with RVR  - s/p Cardizem 15 mg IV x 1, briefly on cardizem gtt (stopped by ICU), s/p Digoxin 500 mcg IVP x 1  - RRT called in ED @ 1620 for afib with RVR and hypotension  - amiodarone gtt started for rate control. given pts hypotension can't use AV yung blockers, will use amio  - was given digoxin x 1, should complete load with 0.25 q6hrs x 2 doses to complete 1 mg dose  - if BP stable can start Cardizem 30 q6hrs, however pt remains hypotensive in setting of sepsis  - pt on eliquis at home, unsure if pt took home dose today.  - would start a/c with either heparin gtt or lovenox while pt is awaiting podiatry surgery. once HR under better control, will reassess medical optimization for debridement procedure    - c/w home ASA 81 mg qd  - on home cardizem  mg qd  - on home metoprolol 100 mg ER q12hrs    #Hypotension  - hypotensive on presentation to ED, monitor routine hemodynamics.  - s/p IVF bolus x 5  - continue IVF given volume depletion  - would have low threshold for pressor support  - hold home metoprolol succinate 100 mg qd given low BP    - Patient without symptoms of angina or heart failure at this time.  - No acute changes on EKG compared to previous, shows afib with RVR    - No meaningful evidence of volume overload.  - Echo from 6/14 showed TTE showed normal EF and mod-severe MR.   - Strict I/Os, daily weights.    - Monitor and replete lytes, keep K>4, Mg>2.    - Other cardiovascular workup will depend on clinical course.  - All other workup per primary team.  - Will continue to follow.             Assessment:   53 YO M with past medical history of AFib (on Eliquis), s/p cardiac arrest x 2 w/ AHRF (on recent admission), indwelling Aguilera, cerebral aneurysm, CAD (s/p stents), CVA, T2DM, HTN, OM (s/p debridement), PE, perforated gastric ulcer s/p ometnopexy 2019 with Dr. Mejia who presents with possible gangrenous right foot. In ED pt found to be in afib with RVR and hypotension.    Plan:   #afib with RVR  - s/p Cardizem 15 mg IV x 1, briefly on cardizem gtt (stopped by ICU), s/p Digoxin 500 mcg IVP x 1  - RRT called in ED @ 1620 for afib with RVR and hypotension  - amiodarone gtt started for rate control. given pts hypotension can't use AV yung blockers, will use amio  - was given digoxin x 1, should complete load with 0.25 q6hrs x 2 doses to complete 1 mg dose  - if BP stable can start Cardizem 30 q6hrs, however pt remains hypotensive in setting of sepsis  - pt on eliquis at home, unsure if pt took home dose today.  - would start a/c with either heparin gtt or lovenox while pt is awaiting podiatry surgery. once HR under better control, will reassess medical optimization for debridement procedure    #Hypotension  - hypotensive on presentation to ED, monitor routine hemodynamics.  - s/p IVF bolus x 5  - continue IVF given volume depletion  - would have low threshold for pressor support  - hold home metoprolol succinate  mg qd given low BP    - Patient without symptoms of angina or heart failure at this time.  - No acute changes on EKG compared to previous, shows afib with RVR    - No meaningful evidence of volume overload.  - Echo from 6/14 showed TTE showed normal EF and mod-severe MR.   - Strict I/Os, daily weights.    - Monitor and replete lytes, keep K>4, Mg>2.    - Other cardiovascular workup will depend on clinical course.  - All other workup per primary team.  - Will continue to follow.

## 2024-09-05 NOTE — CHART NOTE - NSCHARTNOTEFT_GEN_A_CORE
54 year old male with a PMH of afib on Eliquis, indwelling jacques, CAD s/p stents, CVA, DM2, HTN, R heel OM s/p debridement, PE who presented to the ED from wound care with hypotension.  On arrival to the ED patient was found to be in afib RVR and hypotensive.  Workup revealed a leukocytosis and a lactate of 2.7.      Initiated on amiodarone gtt during RRT earlier for persistent afib RVR despite a cardizem infusion and 500 mcg of digoxin.  Now better rate controlled with rates in the 120s-140s on evaluation.  Patient has been maintaining MAPs >70.  ICU re-consulted for concerns of hypotension.  Will upgrade to ICU level of care.  Continue IVF hydration @ 100 cc/hr.  Continue amio infusion.  If needed, will utilize vasopressors for hypotension.  ID following for abx recs.  Podiatry following as well. 54 year old male with a PMH of afib on Eliquis, indwelling jacques, CAD s/p stents, CVA, DM2, HTN, R heel OM s/p debridement, PE who presented to the ED from wound care with hypotension.  On arrival to the ED patient was found to be in afib RVR and hypotensive.  Workup revealed a leukocytosis and a lactate of 2.7.      Initiated on amiodarone gtt during RRT earlier for persistent afib RVR despite a cardizem infusion and 500 mcg of digoxin.  Now better rate controlled with rates in the 120s-140s on evaluation.  Patient has been maintaining MAPs >70.  ICU re-consulted for concerns of hypotension.  Will upgrade to ICU level of care.  Echo from June with a LVEF of 55-60%.  Will increase IVF to 100 cc/hr.  Continue amio infusion.  Will initiate heparin gtt for AC while in the hospital given likely surgical procedure in the foreseeable future.  If needed, will utilize vasopressors for hypotension.  ID following for abx recs.  Podiatry following as well. 54 year old male with a PMH of afib on Eliquis, indwelling jacques, CAD s/p stents, CVA, DM2, HTN, R heel OM s/p debridement, PE who presented to the ED from wound care with hypotension.  On arrival to the ED patient was found to be in afib RVR and hypotensive.  Workup revealed a leukocytosis and a lactate of 2.7.      Initiated on amiodarone gtt during RRT earlier for persistent afib RVR despite a cardizem infusion and 500 mcg of digoxin.  Now better rate controlled with rates in the 120s-140s on evaluation.  Patient has been maintaining MAPs >70.  ICU re-consulted for concerns of hypotension.  Will upgrade to ICU level of care.  Echo from June with a LVEF of 55-60%.  Will increase IVF to 100 cc/hr.  Continue amio infusion.  Will initiate heparin gtt for AC tomorrow while in the hospital given likely surgical procedure in the foreseeable future.  If needed, will utilize vasopressors for hypotension.  ID following for abx recs.  Podiatry following as well.

## 2024-09-05 NOTE — ED PROVIDER NOTE - CARDIAC RATE
----- Message from Radha Church sent at 2/22/2019 11:23 AM CST -----  Contact: Self- [phone]  [facility]- pt called to determine if biopsy results were in from 2/14- please contact pt at [phone]    T[facility]CARDIC

## 2024-09-05 NOTE — H&P ADULT - NSHPLABSRESULTS_GEN_ALL_CORE
09-05    139  |  107  |  22  ----------------------------<  73  4.3   |  23  |  1.20    Ca    8.0<L>      05 Sep 2024 10:25    TPro  5.6<L>  /  Alb  1.6<L>  /  TBili  0.6  /  DBili  x   /  AST  23  /  ALT  26  /  AlkPhos  182<H>  09-05                            8.3    19.41 )-----------( 513      ( 05 Sep 2024 10:35 )             27.3             LIVER FUNCTIONS - ( 05 Sep 2024 10:25 )  Alb: 1.6 g/dL / Pro: 5.6 g/dL / ALK PHOS: 182 U/L / ALT: 26 U/L / AST: 23 U/L / GGT: x             PT/INR - ( 05 Sep 2024 10:25 )   PT: 22.2 sec;   INR: 1.99 ratio         PTT - ( 05 Sep 2024 10:25 )  PTT:37.5 sec    Urinalysis Basic - ( 05 Sep 2024 10:25 )    Color: x / Appearance: x / SG: x / pH: x  Gluc: 73 mg/dL / Ketone: x  / Bili: x / Urobili: x   Blood: x / Protein: x / Nitrite: x   Leuk Esterase: x / RBC: x / WBC x   Sq Epi: x / Non Sq Epi: x / Bacteria: x

## 2024-09-05 NOTE — H&P ADULT - HISTORY OF PRESENT ILLNESS
55yo male bib ems from wound care with hypotension and poss gangrenous foot, being treated after having amputations, pt denies fever, chills, has no pain  In ER patient was found to be in hypotension and rapid AF.  patient is being admitted for further work up and treatment.   spouse

## 2024-09-05 NOTE — CONSULT NOTE ADULT - SUBJECTIVE AND OBJECTIVE BOX
[facility]         Zahra Soto Patel, Savella, Cohen      [phone] (office)    Reason for Consult: Preop clearance      HPI:  53 YO M with past medical history of AFib (on Eliquis), indwelling Aguilera, cerebral aneurysm, CAD (s/p stents), CVA, T2DM, HTN, OM (s/p debridement), PE, perforated gastric ulcer s/p ometnopexy 2019 with Dr. Mejia who presents with possible gangrenous right foot. Pt has wound to right heel, follows with podiatry. Presents to ED with malodorous right heel wound and sepsis. Seen by podiatry in ED plan for  surgical debridement today.     Prior admission, presented with difficulty breathing. During admission had s/p cardiac arrest x2 w/ AHRF admitted to the ICU, sedated intubated required pressors with successful extubation. Echo from 6/14 showed TTE showed normal EF and mod-severe MR. Was downgraded to GMF (6/18).  johnny possibly reflexive from resp failure. Hx of known afib with rapid rates at times due to missed BB doses. discharged 7/8/24.         PAST MEDICAL & SURGICAL HISTORY:  Diabetes      Diabetes mellitus with no complication      Afib      Hypertension      BPH (benign prostatic hyperplasia)      Perforated gastric ulcer  s/p emergent ex-lap omentopexy and plication 6/2019      Pulmonary embolism      History of non-ST elevation myocardial infarction (NSTEMI)      Osteomyelitis  s/p debridement      CAD S/P percutaneous coronary angioplasty      Cerebrovascular accident      H/O abdominal surgery      Perforated gastric ulcer      Traumatic amputation of left foot, initial encounter          SOCIAL HISTORY: No active tobacco, alcohol or illicit drug use    FAMILY HISTORY:  FH: pulmonary embolism  Mother    FH: coronary artery disease  Father    FH: stroke  Father        Home Medications:  acetaminophen 325 mg oral tablet: 2 tab(s) orally every 6 hours As needed Mild Pain (1 - 3) (06 Jul 2024 17:24)  apixaban 5 mg oral tablet: 1 tab(s) orally 2 times a day (14 Jun 2024 10:53)  ascorbic acid 1000 mg oral tablet: 1 tab(s) orally once a day (14 Jun 2024 10:53)  aspirin 81 mg oral tablet, chewable: 1 tab(s) chewed once a day (14 Jun 2024 10:53)  atorvastatin 40 mg oral tablet: 1 tab(s) orally once a day (at bedtime) (14 Jun 2024 10:53)  calamine topical lotion: 1 Apply topically to affected area 2 times a day (06 Jul 2024 17:24)  cholecalciferol 1250 mcg (50,000 intl units) oral capsule: 1 cap(s) orally once a month (14 Jun 2024 10:53)  cranberry oral capsule: 450 milligram(s) orally once a day (14 Jun 2024 10:54)  DilTIAZem (Eqv-Cardizem CD) 360 mg/24 hours oral capsule, extended release: 1 cap(s) orally once a day (14 Jun 2024 10:54)  docusate sodium 100 mg oral capsule: 3 cap(s) orally once a day (at bedtime) (14 Jun 2024 10:54)  famotidine 20 mg oral tablet: 1 tab(s) orally once a day (in the morning) (14 Jun 2024 10:54)  ferrous sulfate 325 mg (65 mg elemental iron) oral tablet: 1 tab(s) orally 2 times a day (14 Jun 2024 10:54)  fexofenadine 60 mg oral tablet: 1 tab(s) orally once a day (14 Jun 2024 10:53)  gabapentin 100 mg oral capsule: 1 cap(s) orally every 12 hours (08 Jul 2024 14:34)  levocetirizine 5 mg oral tablet: 1 tab(s) orally once a day (at bedtime) (14 Jun 2024 10:54)  melatonin 5 mg oral tablet: 1 tab(s) orally once a day (at bedtime) (14 Jun 2024 10:54)  metFORMIN 1000 mg oral tablet: 1 tab(s) orally 2 times a day (14 Jun 2024 10:54)  metoprolol succinate 100 mg oral tablet, extended release: 1 tab(s) orally every 12 hours (08 Jul 2024 14:34)  Multiple Vitamins oral tablet: 1 tab(s) orally once a day (14 Jun 2024 10:54)  naloxegol 25 mg oral tablet: 1 tab(s) orally once a day (in the morning) (14 Jun 2024 10:54)  oxyBUTYnin 10 mg/24 hr oral tablet, extended release: 1 tab(s) orally once a day (at bedtime) (14 Jun 2024 10:54)  oxyCODONE 10 mg oral tablet: 1 tab(s) orally 2 times a day for pain (MDD: 2 tabs) (14 Jun 2024 10:55)  pantoprazole 40 mg oral delayed release tablet: 1 tab(s) orally once a day (14 Jun 2024 10:55)  polyethylene glycol 3350 oral kit: 17 gram(s) orally once a day (14 Jun 2024 10:55)  Potassium Chloride (Eqv-Klor-Con M20) 20 mEq oral tablet, extended release: 1 tab(s) orally 2 times a day (14 Jun 2024 10:55)  predniSONE 10 mg oral tablet: 1 tab(s) orally once a day (06 Jul 2024 17:24)  Regular Insulin Sliding Scale: Regular insulin sliding  scale (08 Jul 2024 14:48)  senna (sennosides) 8.6 mg oral tablet: 2 tab(s) orally once a day (at bedtime) (14 Jun 2024 10:55)  sodium chloride 0.65% nasal spray: 1 spray(s) intranasally once a day both nostrils once daily in the morning (06 Jul 2024 17:24)  sucralfate 1 g oral tablet: 1 tab(s) orally once a day before meals (14 Jun 2024 10:55)  tiotropium-olodaterol 2.5 mcg-2.5 mcg/inh inhalation aerosol: 2 puff(s) inhaled once a day (14 Jun 2024 10:55)  vancomycin 1.25 g intravenous injection: 1.25 gram(s) intravenous every 24 hours last day 8/17/24 (08 Jul 2024 14:48)      MEDICATIONS  (STANDING):  digoxin  Injectable 500 MICROGram(s) IV Push once  lactated ringers. 1000 milliLiter(s) (75 mL/Hr) IV Continuous <Continuous>  sodium chloride 0.9% Bolus 1000 milliLiter(s) IV Bolus once    MEDICATIONS  (PRN):      Allergies    fish (Hives)  ertapenem (Blisters; Rash)    Intolerances        REVIEW OF SYSTEMS: Negative except as per HPI.    VITAL SIGNS:   Vital Signs Last 24 Hrs  T(C): 37.7 (05 Sep 2024 09:50), Max: 37.7 (05 Sep 2024 09:50)  T(F): 99.9 (05 Sep 2024 09:50), Max: 99.9 (05 Sep 2024 09:50)  HR: 156 (05 Sep 2024 14:00) (126 - 156)  BP: 99/69 (05 Sep 2024 14:00) (66/49 - 136/68)  BP(mean): --  RR: 20 (05 Sep 2024 11:31) (20 - 26)  SpO2: 100% (05 Sep 2024 11:31) (96% - 100%)    Parameters below as of 05 Sep 2024 11:31  Patient On (Oxygen Delivery Method): room air        I&O's Summary      PHYSICAL EXAM:  Constitutional: NAD, well-developed  HEENT NC/AT, moist mucous membranes  Pulmonary: Non-labored, breath sounds are clear bilaterally, no wheezing, rales or rhonchi  Cardiovascular: +S1, S2, RRR, no murmur  Gastrointestinal: Soft, nontender, nondistended, normoactive bowel sounds  Extremities: No peripheral edema   Neurological: Alert, strength and sensitivity are grossly intact  Skin: No obvious lesions/rashes  Psych: Mood & affect appropriate    LABS: All Labs Reviewed:                        8.3    19.41 )-----------( 513      ( 05 Sep 2024 10:35 )             27.3     05 Sep 2024 10:25    139    |  107    |  22     ----------------------------<  73     4.3     |  23     |  1.20     Ca    8.0        05 Sep 2024 10:25    TPro  5.6    /  Alb  1.6    /  TBili  0.6    /  DBili  x      /  AST  23     /  ALT  26     /  AlkPhos  182    05 Sep 2024 10:25    PT/INR - ( 05 Sep 2024 10:25 )   PT: 22.2 sec;   INR: 1.99 ratio         PTT - ( 05 Sep 2024 10:25 )  PTT:37.5 sec              EKG:     RADIOLOGY:    CXR:    TTE from 6/14/24:   CONCLUSIONS:   1. Left ventricular cavity is normal in size. Left ventricular systolic function is normal with an ejection fraction visually estimated at 55 to 60 %. There are no regional wall motion abnormalities seen.   2. Moderate to severe left ventricular hypertrophy.   3. Normal right ventricular cavity size, with normal wall thickness, and normal systolic function.   4. The left atrium is severely dilated.   5. The right atrium is severely dilated.   6. Trileaflet aortic valve with normal systolic excursion. There is focal calcification of the aortic valve leaflets.   7. Mild to moderate mitral regurgitation.   8. There is moderate calcification of the mitral valve annulus.   9. Structurally normal tricuspid valve with normal leaflet excursion. Mild tricuspid regurgitation.  10. The inferior vena cava is normal in size measuring 1.70 cm in diameter, (normal <2.1cm) with normal inspiratory collapse (normal >50%) consistent with normal right atrial pressure (~3, range 0-5mmHg).  11. Estimated pulmonary artery systolic pressure is 43 mmHg.     [facility]         Zahra Soto Patel, Savella, Francesco      [phone] (office)    Reason for Consult: afib with RVR      HPI:  55 YO M with past medical history of AFib (on Eliquis), indwelling Aguilera, cerebral aneurysm, CAD (s/p stents), CVA, T2DM, HTN, OM (s/p debridement), PE, perforated gastric ulcer s/p ometnopexy 2019 with Dr. Mejia who presents with possible gangrenous right foot. Pt has wound to right heel, follows with podiatry. Presents to ED with malodorous right heel wound and sepsis. Seen by podiatry in ED plan for  surgical debridement today.     Prior admission, presented with difficulty breathing. During admission had s/p cardiac arrest x2 w/ AHRF admitted to the ICU, sedated intubated required pressors with successful extubation. Echo from 6/14 showed TTE showed normal EF and mod-severe MR. Was downgraded to GMF (6/18).  johnny possibly reflexive from resp failure. Hx of known afib with rapid rates at times due to missed BB doses. discharged 7/8/24.         PAST MEDICAL & SURGICAL HISTORY:  Diabetes      Diabetes mellitus with no complication      Afib      Hypertension      BPH (benign prostatic hyperplasia)      Perforated gastric ulcer  s/p emergent ex-lap omentopexy and plication 6/2019      Pulmonary embolism      History of non-ST elevation myocardial infarction (NSTEMI)      Osteomyelitis  s/p debridement      CAD S/P percutaneous coronary angioplasty      Cerebrovascular accident      H/O abdominal surgery      Perforated gastric ulcer      Traumatic amputation of left foot, initial encounter          SOCIAL HISTORY: No active tobacco, alcohol or illicit drug use    FAMILY HISTORY:  FH: pulmonary embolism  Mother    FH: coronary artery disease  Father    FH: stroke  Father        Home Medications:  acetaminophen 325 mg oral tablet: 2 tab(s) orally every 6 hours As needed Mild Pain (1 - 3) (06 Jul 2024 17:24)  apixaban 5 mg oral tablet: 1 tab(s) orally 2 times a day (14 Jun 2024 10:53)  ascorbic acid 1000 mg oral tablet: 1 tab(s) orally once a day (14 Jun 2024 10:53)  aspirin 81 mg oral tablet, chewable: 1 tab(s) chewed once a day (14 Jun 2024 10:53)  atorvastatin 40 mg oral tablet: 1 tab(s) orally once a day (at bedtime) (14 Jun 2024 10:53)  calamine topical lotion: 1 Apply topically to affected area 2 times a day (06 Jul 2024 17:24)  cholecalciferol 1250 mcg (50,000 intl units) oral capsule: 1 cap(s) orally once a month (14 Jun 2024 10:53)  cranberry oral capsule: 450 milligram(s) orally once a day (14 Jun 2024 10:54)  DilTIAZem (Eqv-Cardizem CD) 360 mg/24 hours oral capsule, extended release: 1 cap(s) orally once a day (14 Jun 2024 10:54)  docusate sodium 100 mg oral capsule: 3 cap(s) orally once a day (at bedtime) (14 Jun 2024 10:54)  famotidine 20 mg oral tablet: 1 tab(s) orally once a day (in the morning) (14 Jun 2024 10:54)  ferrous sulfate 325 mg (65 mg elemental iron) oral tablet: 1 tab(s) orally 2 times a day (14 Jun 2024 10:54)  fexofenadine 60 mg oral tablet: 1 tab(s) orally once a day (14 Jun 2024 10:53)  gabapentin 100 mg oral capsule: 1 cap(s) orally every 12 hours (08 Jul 2024 14:34)  levocetirizine 5 mg oral tablet: 1 tab(s) orally once a day (at bedtime) (14 Jun 2024 10:54)  melatonin 5 mg oral tablet: 1 tab(s) orally once a day (at bedtime) (14 Jun 2024 10:54)  metFORMIN 1000 mg oral tablet: 1 tab(s) orally 2 times a day (14 Jun 2024 10:54)  metoprolol succinate 100 mg oral tablet, extended release: 1 tab(s) orally every 12 hours (08 Jul 2024 14:34)  Multiple Vitamins oral tablet: 1 tab(s) orally once a day (14 Jun 2024 10:54)  naloxegol 25 mg oral tablet: 1 tab(s) orally once a day (in the morning) (14 Jun 2024 10:54)  oxyBUTYnin 10 mg/24 hr oral tablet, extended release: 1 tab(s) orally once a day (at bedtime) (14 Jun 2024 10:54)  oxyCODONE 10 mg oral tablet: 1 tab(s) orally 2 times a day for pain (MDD: 2 tabs) (14 Jun 2024 10:55)  pantoprazole 40 mg oral delayed release tablet: 1 tab(s) orally once a day (14 Jun 2024 10:55)  polyethylene glycol 3350 oral kit: 17 gram(s) orally once a day (14 Jun 2024 10:55)  Potassium Chloride (Eqv-Klor-Con M20) 20 mEq oral tablet, extended release: 1 tab(s) orally 2 times a day (14 Jun 2024 10:55)  predniSONE 10 mg oral tablet: 1 tab(s) orally once a day (06 Jul 2024 17:24)  Regular Insulin Sliding Scale: Regular insulin sliding  scale (08 Jul 2024 14:48)  senna (sennosides) 8.6 mg oral tablet: 2 tab(s) orally once a day (at bedtime) (14 Jun 2024 10:55)  sodium chloride 0.65% nasal spray: 1 spray(s) intranasally once a day both nostrils once daily in the morning (06 Jul 2024 17:24)  sucralfate 1 g oral tablet: 1 tab(s) orally once a day before meals (14 Jun 2024 10:55)  tiotropium-olodaterol 2.5 mcg-2.5 mcg/inh inhalation aerosol: 2 puff(s) inhaled once a day (14 Jun 2024 10:55)  vancomycin 1.25 g intravenous injection: 1.25 gram(s) intravenous every 24 hours last day 8/17/24 (08 Jul 2024 14:48)      MEDICATIONS  (STANDING):  digoxin  Injectable 500 MICROGram(s) IV Push once  lactated ringers. 1000 milliLiter(s) (75 mL/Hr) IV Continuous <Continuous>  sodium chloride 0.9% Bolus 1000 milliLiter(s) IV Bolus once    MEDICATIONS  (PRN):      Allergies    fish (Hives)  ertapenem (Blisters; Rash)    Intolerances        REVIEW OF SYSTEMS: Negative except as per HPI.    VITAL SIGNS:   Vital Signs Last 24 Hrs  T(C): 37.7 (05 Sep 2024 09:50), Max: 37.7 (05 Sep 2024 09:50)  T(F): 99.9 (05 Sep 2024 09:50), Max: 99.9 (05 Sep 2024 09:50)  HR: 156 (05 Sep 2024 14:00) (126 - 156)  BP: 99/69 (05 Sep 2024 14:00) (66/49 - 136/68)  BP(mean): --  RR: 20 (05 Sep 2024 11:31) (20 - 26)  SpO2: 100% (05 Sep 2024 11:31) (96% - 100%)    Parameters below as of 05 Sep 2024 11:31  Patient On (Oxygen Delivery Method): room air        I&O's Summary      PHYSICAL EXAM:  Constitutional: NAD, well-developed  HEENT NC/AT, moist mucous membranes  Pulmonary: Non-labored, breath sounds are clear bilaterally, no wheezing, rales or rhonchi  Cardiovascular: +S1, S2, RRR, no murmur  Gastrointestinal: Soft, nontender, nondistended, normoactive bowel sounds  Extremities: No peripheral edema   Neurological: Alert, strength and sensitivity are grossly intact  Skin: No obvious lesions/rashes  Psych: Mood & affect appropriate    LABS: All Labs Reviewed:                        8.3    19.41 )-----------( 513      ( 05 Sep 2024 10:35 )             27.3     05 Sep 2024 10:25    139    |  107    |  22     ----------------------------<  73     4.3     |  23     |  1.20     Ca    8.0        05 Sep 2024 10:25    TPro  5.6    /  Alb  1.6    /  TBili  0.6    /  DBili  x      /  AST  23     /  ALT  26     /  AlkPhos  182    05 Sep 2024 10:25    PT/INR - ( 05 Sep 2024 10:25 )   PT: 22.2 sec;   INR: 1.99 ratio         PTT - ( 05 Sep 2024 10:25 )  PTT:37.5 sec              EKG:     RADIOLOGY:    CXR:    TTE from 6/14/24:   CONCLUSIONS:   1. Left ventricular cavity is normal in size. Left ventricular systolic function is normal with an ejection fraction visually estimated at 55 to 60 %. There are no regional wall motion abnormalities seen.   2. Moderate to severe left ventricular hypertrophy.   3. Normal right ventricular cavity size, with normal wall thickness, and normal systolic function.   4. The left atrium is severely dilated.   5. The right atrium is severely dilated.   6. Trileaflet aortic valve with normal systolic excursion. There is focal calcification of the aortic valve leaflets.   7. Mild to moderate mitral regurgitation.   8. There is moderate calcification of the mitral valve annulus.   9. Structurally normal tricuspid valve with normal leaflet excursion. Mild tricuspid regurgitation.  10. The inferior vena cava is normal in size measuring 1.70 cm in diameter, (normal <2.1cm) with normal inspiratory collapse (normal >50%) consistent with normal right atrial pressure (~3, range 0-5mmHg).  11. Estimated pulmonary artery systolic pressure is 43 mmHg.     [facility]         Zahra Soto Patel, Román, Francesco      [phone] (office)    Reason for Consult: afib with RVR      HPI:  55 YO M with past medical history of AFib (on Eliquis), s/p cardiac arrest x 2 w/ AHRF (on recent admission), indwelling Aguilera, cerebral aneurysm, CAD (s/p stents), CVA, T2DM, HTN, OM (s/p debridement), PE, perforated gastric ulcer s/p ometnopexy 2019 with Dr. Mejia who presents with possible gangrenous right foot. Pt has wound to right heel, earlier today was at wound care and pt was sent to ED. Presents to ED with malodorous right heel wound and sepsis. Seen by podiatry in ED plan for surgical debridement today. While in ED pt found to be in afib with RVR. Pt complaining of right sided chest pain, that comes and goes. Describes the chest pain as sharp. Pt also reports pain in his abdomen and right foot. Denies palpitations, dizziness, SOB, lightheadedness. Pt [facility] at [facility].      Prior admission from 6/13 - 7/8/24, last admission pt presented to ED and was found to be apneic and bradycardic. He was intubated, received CPR and received IV epi and atropine with ROSC. He was started on levophed. Workup showed leukocytosis, OBI, hyperglycemia, metabolic/respiratory acidosis, transaminitis ICU was consulted and during evaluation pt became bradycardic and had another brief asystolic cardiac arrest. Given one epi and one bicarb with ROSC. admitted to the ICU, was sedated, intubated and required pressors with successful extubation. Echo from 6/14 showed TTE showed normal EF and mod-severe MR. There was unclear etiology for his cardiac arrest. He was treated for cardiogenic shock 2/2 bradycardia (possibly reflexive from respiratory failure) and distributive shock 2/2 anaphylactic reaction to ertapenem. Was given digoxin 250 mcgx1, then required amio gtt to stabilize HR. Amio gtt was stopped and he was started on lopressor for rate control. Was downgraded to GMF on 6/18.  Hx of known afib with rapid rates at times due to missed BB doses. Was discharged 7/8/24.         PAST MEDICAL & SURGICAL HISTORY:  Diabetes      Diabetes mellitus with no complication      Afib      Hypertension      BPH (benign prostatic hyperplasia)      Perforated gastric ulcer  s/p emergent ex-lap omentopexy and plication 6/2019      Pulmonary embolism      History of non-ST elevation myocardial infarction (NSTEMI)      Osteomyelitis  s/p debridement      CAD S/P percutaneous coronary angioplasty      Cerebrovascular accident      H/O abdominal surgery      Perforated gastric ulcer      Traumatic amputation of left foot, initial encounter          SOCIAL HISTORY: No active tobacco, alcohol or illicit drug use    FAMILY HISTORY:  FH: pulmonary embolism  Mother    FH: coronary artery disease  Father    FH: stroke  Father        Home Medications:  acetaminophen 325 mg oral tablet: 2 tab(s) orally every 6 hours As needed Mild Pain (1 - 3) (06 Jul 2024 17:24)  apixaban 5 mg oral tablet: 1 tab(s) orally 2 times a day (14 Jun 2024 10:53)  ascorbic acid 1000 mg oral tablet: 1 tab(s) orally once a day (14 Jun 2024 10:53)  aspirin 81 mg oral tablet, chewable: 1 tab(s) chewed once a day (14 Jun 2024 10:53)  atorvastatin 40 mg oral tablet: 1 tab(s) orally once a day (at bedtime) (14 Jun 2024 10:53)  calamine topical lotion: 1 Apply topically to affected area 2 times a day (06 Jul 2024 17:24)  cholecalciferol 1250 mcg (50,000 intl units) oral capsule: 1 cap(s) orally once a month (14 Jun 2024 10:53)  cranberry oral capsule: 450 milligram(s) orally once a day (14 Jun 2024 10:54)  DilTIAZem (Eqv-Cardizem CD) 360 mg/24 hours oral capsule, extended release: 1 cap(s) orally once a day (14 Jun 2024 10:54)  docusate sodium 100 mg oral capsule: 3 cap(s) orally once a day (at bedtime) (14 Jun 2024 10:54)  famotidine 20 mg oral tablet: 1 tab(s) orally once a day (in the morning) (14 Jun 2024 10:54)  ferrous sulfate 325 mg (65 mg elemental iron) oral tablet: 1 tab(s) orally 2 times a day (14 Jun 2024 10:54)  fexofenadine 60 mg oral tablet: 1 tab(s) orally once a day (14 Jun 2024 10:53)  gabapentin 100 mg oral capsule: 1 cap(s) orally every 12 hours (08 Jul 2024 14:34)  levocetirizine 5 mg oral tablet: 1 tab(s) orally once a day (at bedtime) (14 Jun 2024 10:54)  melatonin 5 mg oral tablet: 1 tab(s) orally once a day (at bedtime) (14 Jun 2024 10:54)  metFORMIN 1000 mg oral tablet: 1 tab(s) orally 2 times a day (14 Jun 2024 10:54)  metoprolol succinate 100 mg oral tablet, extended release: 1 tab(s) orally every 12 hours (08 Jul 2024 14:34)  Multiple Vitamins oral tablet: 1 tab(s) orally once a day (14 Jun 2024 10:54)  naloxegol 25 mg oral tablet: 1 tab(s) orally once a day (in the morning) (14 Jun 2024 10:54)  oxyBUTYnin 10 mg/24 hr oral tablet, extended release: 1 tab(s) orally once a day (at bedtime) (14 Jun 2024 10:54)  oxyCODONE 10 mg oral tablet: 1 tab(s) orally 2 times a day for pain (MDD: 2 tabs) (14 Jun 2024 10:55)  pantoprazole 40 mg oral delayed release tablet: 1 tab(s) orally once a day (14 Jun 2024 10:55)  polyethylene glycol 3350 oral kit: 17 gram(s) orally once a day (14 Jun 2024 10:55)  Potassium Chloride (Eqv-Klor-Con M20) 20 mEq oral tablet, extended release: 1 tab(s) orally 2 times a day (14 Jun 2024 10:55)  predniSONE 10 mg oral tablet: 1 tab(s) orally once a day (06 Jul 2024 17:24)  Regular Insulin Sliding Scale: Regular insulin sliding  scale (08 Jul 2024 14:48)  senna (sennosides) 8.6 mg oral tablet: 2 tab(s) orally once a day (at bedtime) (14 Jun 2024 10:55)  sodium chloride 0.65% nasal spray: 1 spray(s) intranasally once a day both nostrils once daily in the morning (06 Jul 2024 17:24)  sucralfate 1 g oral tablet: 1 tab(s) orally once a day before meals (14 Jun 2024 10:55)  tiotropium-olodaterol 2.5 mcg-2.5 mcg/inh inhalation aerosol: 2 puff(s) inhaled once a day (14 Jun 2024 10:55)  vancomycin 1.25 g intravenous injection: 1.25 gram(s) intravenous every 24 hours last day 8/17/24 (08 Jul 2024 14:48)      MEDICATIONS  (STANDING):  digoxin  Injectable 500 MICROGram(s) IV Push once  lactated ringers. 1000 milliLiter(s) (75 mL/Hr) IV Continuous <Continuous>  sodium chloride 0.9% Bolus 1000 milliLiter(s) IV Bolus once    MEDICATIONS  (PRN):      Allergies    fish (Hives)  ertapenem (Blisters; Rash)    Intolerances        REVIEW OF SYSTEMS: Negative except as per HPI.    VITAL SIGNS:   Vital Signs Last 24 Hrs  T(C): 37.7 (05 Sep 2024 09:50), Max: 37.7 (05 Sep 2024 09:50)  T(F): 99.9 (05 Sep 2024 09:50), Max: 99.9 (05 Sep 2024 09:50)  HR: 156 (05 Sep 2024 14:00) (126 - 156)  BP: 99/69 (05 Sep 2024 14:00) (66/49 - 136/68)  BP(mean): --  RR: 20 (05 Sep 2024 11:31) (20 - 26)  SpO2: 100% (05 Sep 2024 11:31) (96% - 100%)    Parameters below as of 05 Sep 2024 11:31  Patient On (Oxygen Delivery Method): room air        I&O's Summary      PHYSICAL EXAM:  Constitutional: mild distress secondary to foot pain,   HEENT NC/AT, moist mucous membranes  Pulmonary: Non-labored, breath sounds are clear bilaterally, no wheezing, rales or rhonchi  Cardiovascular: irregular rhythm, tachycardic, reproducible tenderness to palpation of anterior chest wall   Gastrointestinal: Soft, nontender, nondistended, normoactive bowel sounds  Extremities: left foot amputated. right foot wrapped in bandage.   Neurological: Alert, awake, answers questions appropriately, follows commands  Psych: Mood & affect appropriate    LABS: All Labs Reviewed:                        8.3    19.41 )-----------( 513      ( 05 Sep 2024 10:35 )             27.3     05 Sep 2024 10:25    139    |  107    |  22     ----------------------------<  73     4.3     |  23     |  1.20     Ca    8.0        05 Sep 2024 10:25    TPro  5.6    /  Alb  1.6    /  TBili  0.6    /  DBili  x      /  AST  23     /  ALT  26     /  AlkPhos  182    05 Sep 2024 10:25    PT/INR - ( 05 Sep 2024 10:25 )   PT: 22.2 sec;   INR: 1.99 ratio         PTT - ( 05 Sep 2024 10:25 )  PTT:37.5 sec              EKG: atrial fibrillation with RVR @ 136 bpm. Low voltage QRS. Abnormal QRS-T angle, consider primary T wave abnormality.     RADIOLOGY:    CXR: official read pending    TTE from 6/14/24:   CONCLUSIONS:   1. Left ventricular cavity is normal in size. Left ventricular systolic function is normal with an ejection fraction visually estimated at 55 to 60 %. There are no regional wall motion abnormalities seen.   2. Moderate to severe left ventricular hypertrophy.   3. Normal right ventricular cavity size, with normal wall thickness, and normal systolic function.   4. The left atrium is severely dilated.   5. The right atrium is severely dilated.   6. Trileaflet aortic valve with normal systolic excursion. There is focal calcification of the aortic valve leaflets.   7. Mild to moderate mitral regurgitation.   8. There is moderate calcification of the mitral valve annulus.   9. Structurally normal tricuspid valve with normal leaflet excursion. Mild tricuspid regurgitation.  10. The inferior vena cava is normal in size measuring 1.70 cm in diameter, (normal <2.1cm) with normal inspiratory collapse (normal >50%) consistent with normal right atrial pressure (~3, range 0-5mmHg).  11. Estimated pulmonary artery systolic pressure is 43 mmHg.     [facility]         Zahra Soto Patel, Román, Francesco      [phone] (office)    Reason for Consult: afib with RVR      HPI:  55 YO M with past medical history of AFib (on Eliquis), s/p cardiac arrest x 2 w/ AHRF (on recent admission), indwelling Aguilera, cerebral aneurysm, CAD (s/p stents), CVA, T2DM, HTN, OM (s/p debridement), PE, perforated gastric ulcer s/p ometnopexy 2019 with Dr. Mejia who presents with possible gangrenous right foot. Pt has wound to right heel, earlier today was at wound care and pt was sent to ED. Presents to ED with malodorous right heel wound and sepsis. Seen by podiatry in ED plan for surgical debridement today. While in ED pt found to be in afib with RVR. Pt complaining of right sided chest pain, that comes and goes. Describes the chest pain as sharp. Pt also reports pain in his abdomen and right foot. Denies palpitations, dizziness, SOB, lightheadedness. Pt resides at [facility].      Prior admission from 6/13 - 7/8/24, last admission pt presented to ED and was found to be apneic and bradycardic. He was intubated, received CPR and received IV epi and atropine with ROSC. He was started on levophed. Workup showed leukocytosis, OBI, hyperglycemia, metabolic/respiratory acidosis, transaminitis ICU was consulted and during evaluation pt became bradycardic and had another brief asystolic cardiac arrest. Given one epi and one bicarb with ROSC. admitted to the ICU, was sedated, intubated and required pressors with successful extubation. Echo from 6/14 showed TTE showed normal EF and mod-severe MR. There was unclear etiology for his cardiac arrest. He was treated for cardiogenic shock 2/2 bradycardia (possibly reflexive from respiratory failure) and distributive shock 2/2 anaphylactic reaction to ertapenem. Was given digoxin 250 mcgx1, then required amio gtt to stabilize HR. Amio gtt was stopped and he was started on lopressor for rate control. Was downgraded to GMF on 6/18.  Hx of known afib with rapid rates at times due to missed BB doses. Was discharged 7/8/24.         PAST MEDICAL & SURGICAL HISTORY:  Diabetes      Diabetes mellitus with no complication      Afib      Hypertension      BPH (benign prostatic hyperplasia)      Perforated gastric ulcer  s/p emergent ex-lap omentopexy and plication 6/2019      Pulmonary embolism      History of non-ST elevation myocardial infarction (NSTEMI)      Osteomyelitis  s/p debridement      CAD S/P percutaneous coronary angioplasty      Cerebrovascular accident      H/O abdominal surgery      Perforated gastric ulcer      Traumatic amputation of left foot, initial encounter          SOCIAL HISTORY: No active tobacco, alcohol or illicit drug use    FAMILY HISTORY:  FH: pulmonary embolism  Mother    FH: coronary artery disease  Father    FH: stroke  Father        Home Medications:  acetaminophen 325 mg oral tablet: 2 tab(s) orally every 6 hours As needed Mild Pain (1 - 3) (06 Jul 2024 17:24)  apixaban 5 mg oral tablet: 1 tab(s) orally 2 times a day (14 Jun 2024 10:53)  ascorbic acid 1000 mg oral tablet: 1 tab(s) orally once a day (14 Jun 2024 10:53)  aspirin 81 mg oral tablet, chewable: 1 tab(s) chewed once a day (14 Jun 2024 10:53)  atorvastatin 40 mg oral tablet: 1 tab(s) orally once a day (at bedtime) (14 Jun 2024 10:53)  calamine topical lotion: 1 Apply topically to affected area 2 times a day (06 Jul 2024 17:24)  cholecalciferol 1250 mcg (50,000 intl units) oral capsule: 1 cap(s) orally once a month (14 Jun 2024 10:53)  cranberry oral capsule: 450 milligram(s) orally once a day (14 Jun 2024 10:54)  DilTIAZem (Eqv-Cardizem CD) 360 mg/24 hours oral capsule, extended release: 1 cap(s) orally once a day (14 Jun 2024 10:54)  docusate sodium 100 mg oral capsule: 3 cap(s) orally once a day (at bedtime) (14 Jun 2024 10:54)  famotidine 20 mg oral tablet: 1 tab(s) orally once a day (in the morning) (14 Jun 2024 10:54)  ferrous sulfate 325 mg (65 mg elemental iron) oral tablet: 1 tab(s) orally 2 times a day (14 Jun 2024 10:54)  fexofenadine 60 mg oral tablet: 1 tab(s) orally once a day (14 Jun 2024 10:53)  gabapentin 100 mg oral capsule: 1 cap(s) orally every 12 hours (08 Jul 2024 14:34)  levocetirizine 5 mg oral tablet: 1 tab(s) orally once a day (at bedtime) (14 Jun 2024 10:54)  melatonin 5 mg oral tablet: 1 tab(s) orally once a day (at bedtime) (14 Jun 2024 10:54)  metFORMIN 1000 mg oral tablet: 1 tab(s) orally 2 times a day (14 Jun 2024 10:54)  metoprolol succinate 100 mg oral tablet, extended release: 1 tab(s) orally every 12 hours (08 Jul 2024 14:34)  Multiple Vitamins oral tablet: 1 tab(s) orally once a day (14 Jun 2024 10:54)  naloxegol 25 mg oral tablet: 1 tab(s) orally once a day (in the morning) (14 Jun 2024 10:54)  oxyBUTYnin 10 mg/24 hr oral tablet, extended release: 1 tab(s) orally once a day (at bedtime) (14 Jun 2024 10:54)  oxyCODONE 10 mg oral tablet: 1 tab(s) orally 2 times a day for pain (MDD: 2 tabs) (14 Jun 2024 10:55)  pantoprazole 40 mg oral delayed release tablet: 1 tab(s) orally once a day (14 Jun 2024 10:55)  polyethylene glycol 3350 oral kit: 17 gram(s) orally once a day (14 Jun 2024 10:55)  Potassium Chloride (Eqv-Klor-Con M20) 20 mEq oral tablet, extended release: 1 tab(s) orally 2 times a day (14 Jun 2024 10:55)  predniSONE 10 mg oral tablet: 1 tab(s) orally once a day (06 Jul 2024 17:24)  Regular Insulin Sliding Scale: Regular insulin sliding  scale (08 Jul 2024 14:48)  senna (sennosides) 8.6 mg oral tablet: 2 tab(s) orally once a day (at bedtime) (14 Jun 2024 10:55)  sodium chloride 0.65% nasal spray: 1 spray(s) intranasally once a day both nostrils once daily in the morning (06 Jul 2024 17:24)  sucralfate 1 g oral tablet: 1 tab(s) orally once a day before meals (14 Jun 2024 10:55)  tiotropium-olodaterol 2.5 mcg-2.5 mcg/inh inhalation aerosol: 2 puff(s) inhaled once a day (14 Jun 2024 10:55)  vancomycin 1.25 g intravenous injection: 1.25 gram(s) intravenous every 24 hours last day 8/17/24 (08 Jul 2024 14:48)      MEDICATIONS  (STANDING):  digoxin  Injectable 500 MICROGram(s) IV Push once  lactated ringers. 1000 milliLiter(s) (75 mL/Hr) IV Continuous <Continuous>  sodium chloride 0.9% Bolus 1000 milliLiter(s) IV Bolus once    MEDICATIONS  (PRN):      Allergies    fish (Hives)  ertapenem (Blisters; Rash)    Intolerances        REVIEW OF SYSTEMS: Negative except as per HPI.    VITAL SIGNS:   Vital Signs Last 24 Hrs  T(C): 37.7 (05 Sep 2024 09:50), Max: 37.7 (05 Sep 2024 09:50)  T(F): 99.9 (05 Sep 2024 09:50), Max: 99.9 (05 Sep 2024 09:50)  HR: 156 (05 Sep 2024 14:00) (126 - 156)  BP: 99/69 (05 Sep 2024 14:00) (66/49 - 136/68)  BP(mean): --  RR: 20 (05 Sep 2024 11:31) (20 - 26)  SpO2: 100% (05 Sep 2024 11:31) (96% - 100%)    Parameters below as of 05 Sep 2024 11:31  Patient On (Oxygen Delivery Method): room air        I&O's Summary      PHYSICAL EXAM:  Constitutional: mild distress secondary to foot pain,   HEENT NC/AT, moist mucous membranes  Pulmonary: Non-labored, breath sounds are clear bilaterally, no wheezing, rales or rhonchi  Cardiovascular: irregular rhythm, tachycardic, reproducible tenderness to palpation of anterior chest wall   Gastrointestinal: Soft, nontender, nondistended, normoactive bowel sounds  Extremities: left foot amputated. right foot wrapped in bandage.   Neurological: Alert, awake, answers questions appropriately, follows commands  Psych: Mood & affect appropriate    LABS: All Labs Reviewed:                        8.3    19.41 )-----------( 513      ( 05 Sep 2024 10:35 )             27.3     05 Sep 2024 10:25    139    |  107    |  22     ----------------------------<  73     4.3     |  23     |  1.20     Ca    8.0        05 Sep 2024 10:25    TPro  5.6    /  Alb  1.6    /  TBili  0.6    /  DBili  x      /  AST  23     /  ALT  26     /  AlkPhos  182    05 Sep 2024 10:25    PT/INR - ( 05 Sep 2024 10:25 )   PT: 22.2 sec;   INR: 1.99 ratio         PTT - ( 05 Sep 2024 10:25 )  PTT:37.5 sec              EKG: atrial fibrillation with RVR @ 136 bpm. Low voltage QRS. Abnormal QRS-T angle, consider primary T wave abnormality.     RADIOLOGY:    CXR:  Limited survey with no acute abnormality seen..    TTE from 6/14/24:   CONCLUSIONS:   1. Left ventricular cavity is normal in size. Left ventricular systolic function is normal with an ejection fraction visually estimated at 55 to 60 %. There are no regional wall motion abnormalities seen.   2. Moderate to severe left ventricular hypertrophy.   3. Normal right ventricular cavity size, with normal wall thickness, and normal systolic function.   4. The left atrium is severely dilated.   5. The right atrium is severely dilated.   6. Trileaflet aortic valve with normal systolic excursion. There is focal calcification of the aortic valve leaflets.   7. Mild to moderate mitral regurgitation.   8. There is moderate calcification of the mitral valve annulus.   9. Structurally normal tricuspid valve with normal leaflet excursion. Mild tricuspid regurgitation.  10. The inferior vena cava is normal in size measuring 1.70 cm in diameter, (normal <2.1cm) with normal inspiratory collapse (normal >50%) consistent with normal right atrial pressure (~3, range 0-5mmHg).  11. Estimated pulmonary artery systolic pressure is 43 mmHg.     [facility]         Zahra Soto Patel, Román, Francesco      [phone] (office)    Reason for Consult: afib with RVR      HPI:  55 YO M with past medical history of AFib (on Eliquis), s/p cardiac arrest x 2 w/ AHRF (on recent admission from 6/13), indwelling Aguilera, cerebral aneurysm, CAD (s/p stents), CVA, T2DM, HTN, OM (s/p debridement), PE, perforated gastric ulcer s/p ometnopexy 2019 with Dr. Mejia who presents with possible gangrenous right foot. Pt has wound to right heel, earlier today was at wound care, found to be hypotensive and pt was sent to ED. Presents to ED with malodorous right heel wound and sepsis. Seen by podiatry in ED plan for surgical debridement this admission. While in ED pt also found to be in afib with RVR. Pt complaining of right sided chest pain, that comes and goes. Describes the chest pain as sharp. Pt also reports pain in his abdomen and right foot. Denies palpitations, dizziness, SOB, lightheadedness. Pt resides at [facility].      Prior admission from 6/13 - 7/8/24, last admission pt presented to ED and was found to be apneic and bradycardic. He was intubated, received CPR and received IV epi and atropine with ROSC. He was started on levophed. Workup showed leukocytosis, OBI, hyperglycemia, metabolic/respiratory acidosis, transaminitis ICU was consulted and during evaluation pt became bradycardic and had another brief asystolic cardiac arrest. Given one epi and one bicarb with ROSC. admitted to the ICU, was sedated, intubated and required pressors with successful extubation. Echo from 6/14 showed TTE showed normal EF and mod-severe MR. There was unclear etiology for his cardiac arrest. He was treated for cardiogenic shock 2/2 bradycardia (possibly reflexive from respiratory failure) and distributive shock 2/2 anaphylactic reaction to ertapenem. Was given digoxin 250 mcgx1, then required amio gtt to stabilize HR. Amio gtt was stopped and he was started on lopressor for rate control. Was downgraded to GMF on 6/18.  Hx of known afib with rapid rates at times due to missed BB doses. Was discharged 7/8/24.         PAST MEDICAL & SURGICAL HISTORY:  Diabetes      Diabetes mellitus with no complication      Afib      Hypertension      BPH (benign prostatic hyperplasia)      Perforated gastric ulcer  s/p emergent ex-lap omentopexy and plication 6/2019      Pulmonary embolism      History of non-ST elevation myocardial infarction (NSTEMI)      Osteomyelitis  s/p debridement      CAD S/P percutaneous coronary angioplasty      Cerebrovascular accident      H/O abdominal surgery      Perforated gastric ulcer      Traumatic amputation of left foot, initial encounter          SOCIAL HISTORY: No active tobacco, alcohol or illicit drug use    FAMILY HISTORY:  FH: pulmonary embolism  Mother    FH: coronary artery disease  Father    FH: stroke  Father        Home Medications:  acetaminophen 325 mg oral tablet: 2 tab(s) orally every 6 hours As needed Mild Pain (1 - 3) (06 Jul 2024 17:24)  apixaban 5 mg oral tablet: 1 tab(s) orally 2 times a day (14 Jun 2024 10:53)  ascorbic acid 1000 mg oral tablet: 1 tab(s) orally once a day (14 Jun 2024 10:53)  aspirin 81 mg oral tablet, chewable: 1 tab(s) chewed once a day (14 Jun 2024 10:53)  atorvastatin 40 mg oral tablet: 1 tab(s) orally once a day (at bedtime) (14 Jun 2024 10:53)  calamine topical lotion: 1 Apply topically to affected area 2 times a day (06 Jul 2024 17:24)  cholecalciferol 1250 mcg (50,000 intl units) oral capsule: 1 cap(s) orally once a month (14 Jun 2024 10:53)  cranberry oral capsule: 450 milligram(s) orally once a day (14 Jun 2024 10:54)  DilTIAZem (Eqv-Cardizem CD) 360 mg/24 hours oral capsule, extended release: 1 cap(s) orally once a day (14 Jun 2024 10:54)  docusate sodium 100 mg oral capsule: 3 cap(s) orally once a day (at bedtime) (14 Jun 2024 10:54)  famotidine 20 mg oral tablet: 1 tab(s) orally once a day (in the morning) (14 Jun 2024 10:54)  ferrous sulfate 325 mg (65 mg elemental iron) oral tablet: 1 tab(s) orally 2 times a day (14 Jun 2024 10:54)  fexofenadine 60 mg oral tablet: 1 tab(s) orally once a day (14 Jun 2024 10:53)  gabapentin 100 mg oral capsule: 1 cap(s) orally every 12 hours (08 Jul 2024 14:34)  levocetirizine 5 mg oral tablet: 1 tab(s) orally once a day (at bedtime) (14 Jun 2024 10:54)  melatonin 5 mg oral tablet: 1 tab(s) orally once a day (at bedtime) (14 Jun 2024 10:54)  metFORMIN 1000 mg oral tablet: 1 tab(s) orally 2 times a day (14 Jun 2024 10:54)  metoprolol succinate 100 mg oral tablet, extended release: 1 tab(s) orally every 12 hours (08 Jul 2024 14:34)  Multiple Vitamins oral tablet: 1 tab(s) orally once a day (14 Jun 2024 10:54)  naloxegol 25 mg oral tablet: 1 tab(s) orally once a day (in the morning) (14 Jun 2024 10:54)  oxyBUTYnin 10 mg/24 hr oral tablet, extended release: 1 tab(s) orally once a day (at bedtime) (14 Jun 2024 10:54)  oxyCODONE 10 mg oral tablet: 1 tab(s) orally 2 times a day for pain (MDD: 2 tabs) (14 Jun 2024 10:55)  pantoprazole 40 mg oral delayed release tablet: 1 tab(s) orally once a day (14 Jun 2024 10:55)  polyethylene glycol 3350 oral kit: 17 gram(s) orally once a day (14 Jun 2024 10:55)  Potassium Chloride (Eqv-Klor-Con M20) 20 mEq oral tablet, extended release: 1 tab(s) orally 2 times a day (14 Jun 2024 10:55)  predniSONE 10 mg oral tablet: 1 tab(s) orally once a day (06 Jul 2024 17:24)  Regular Insulin Sliding Scale: Regular insulin sliding  scale (08 Jul 2024 14:48)  senna (sennosides) 8.6 mg oral tablet: 2 tab(s) orally once a day (at bedtime) (14 Jun 2024 10:55)  sodium chloride 0.65% nasal spray: 1 spray(s) intranasally once a day both nostrils once daily in the morning (06 Jul 2024 17:24)  sucralfate 1 g oral tablet: 1 tab(s) orally once a day before meals (14 Jun 2024 10:55)  tiotropium-olodaterol 2.5 mcg-2.5 mcg/inh inhalation aerosol: 2 puff(s) inhaled once a day (14 Jun 2024 10:55)  vancomycin 1.25 g intravenous injection: 1.25 gram(s) intravenous every 24 hours last day 8/17/24 (08 Jul 2024 14:48)      MEDICATIONS  (STANDING):  digoxin  Injectable 500 MICROGram(s) IV Push once  lactated ringers. 1000 milliLiter(s) (75 mL/Hr) IV Continuous <Continuous>  sodium chloride 0.9% Bolus 1000 milliLiter(s) IV Bolus once    MEDICATIONS  (PRN):      Allergies    fish (Hives)  ertapenem (Blisters; Rash)    Intolerances        REVIEW OF SYSTEMS: Negative except as per HPI.    VITAL SIGNS:   Vital Signs Last 24 Hrs  T(C): 37.7 (05 Sep 2024 09:50), Max: 37.7 (05 Sep 2024 09:50)  T(F): 99.9 (05 Sep 2024 09:50), Max: 99.9 (05 Sep 2024 09:50)  HR: 156 (05 Sep 2024 14:00) (126 - 156)  BP: 99/69 (05 Sep 2024 14:00) (66/49 - 136/68)  BP(mean): --  RR: 20 (05 Sep 2024 11:31) (20 - 26)  SpO2: 100% (05 Sep 2024 11:31) (96% - 100%)    Parameters below as of 05 Sep 2024 11:31  Patient On (Oxygen Delivery Method): room air        I&O's Summary      PHYSICAL EXAM:  Constitutional: mild distress secondary to foot pain,   HEENT NC/AT, moist mucous membranes  Pulmonary: Non-labored, breath sounds are clear bilaterally, no wheezing, rales or rhonchi  Cardiovascular: irregular rhythm, tachycardic, reproducible tenderness to palpation of anterior chest wall   Gastrointestinal: Soft, nontender, nondistended, normoactive bowel sounds  Extremities: left foot amputated. right foot wrapped in bandage.   Neurological: Alert, awake, answers questions appropriately, follows commands  Psych: Mood & affect appropriate    LABS: All Labs Reviewed:                        8.3    19.41 )-----------( 513      ( 05 Sep 2024 10:35 )             27.3     05 Sep 2024 10:25    139    |  107    |  22     ----------------------------<  73     4.3     |  23     |  1.20     Ca    8.0        05 Sep 2024 10:25    TPro  5.6    /  Alb  1.6    /  TBili  0.6    /  DBili  x      /  AST  23     /  ALT  26     /  AlkPhos  182    05 Sep 2024 10:25    PT/INR - ( 05 Sep 2024 10:25 )   PT: 22.2 sec;   INR: 1.99 ratio         PTT - ( 05 Sep 2024 10:25 )  PTT:37.5 sec              EKG: atrial fibrillation with RVR @ 136 bpm. Low voltage QRS. Abnormal QRS-T angle, consider primary T wave abnormality.     RADIOLOGY:    CXR:  Limited survey with no acute abnormality seen..    TTE from 6/14/24:   CONCLUSIONS:   1. Left ventricular cavity is normal in size. Left ventricular systolic function is normal with an ejection fraction visually estimated at 55 to 60 %. There are no regional wall motion abnormalities seen.   2. Moderate to severe left ventricular hypertrophy.   3. Normal right ventricular cavity size, with normal wall thickness, and normal systolic function.   4. The left atrium is severely dilated.   5. The right atrium is severely dilated.   6. Trileaflet aortic valve with normal systolic excursion. There is focal calcification of the aortic valve leaflets.   7. Mild to moderate mitral regurgitation.   8. There is moderate calcification of the mitral valve annulus.   9. Structurally normal tricuspid valve with normal leaflet excursion. Mild tricuspid regurgitation.  10. The inferior vena cava is normal in size measuring 1.70 cm in diameter, (normal <2.1cm) with normal inspiratory collapse (normal >50%) consistent with normal right atrial pressure (~3, range 0-5mmHg).  11. Estimated pulmonary artery systolic pressure is 43 mmHg.

## 2024-09-05 NOTE — CONSULT NOTE ADULT - PROBLEM SELECTOR RECOMMENDATION 9
Chart reviewed and Patient evaluated.  Discussed diagnosis and treatment with patient.  Patient has severely malodorous and necrotic wound with systemic sepsis.  Patient to be admitted.  Will require surgical debridement today or tomorrow once stabilized from medical standpoint.  DSD in place.  X-rays pending to r/o gas gangrene.  Continue with IV antibiotics As Per ID recs.   WBAT.  Offloading to bilateral Heels.   Podiatry will follow while in house.  Will discuss care plan with attending. Chart reviewed and Patient evaluated.  Discussed diagnosis and treatment with patient.  Patient has severely malodorous and necrotic wound with systemic sepsis.  Patient to be admitted.  Will require surgical debridement today or tomorrow once stabilized from medical standpoint.  DSD in place.  Culture taken of right heel.   X-rays pending to r/o gas gangrene.  Continue with IV antibiotics As Per ID recs.   WBAT.  Offloading to bilateral Heels.   Podiatry will follow while in house.  Will discuss care plan with attending. Chart reviewed and Patient evaluated.  Discussed diagnosis and treatment with patient.  Patient has severely malodorous and necrotic wound with systemic sepsis.  Patient to be admitted.  Will require surgical debridement today or tomorrow once stabilized from medical standpoint.  DSD in place.  Culture taken of right heel.   X-rays pending to r/o gas gangrene.  Continue with IV antibiotics As Per ID recs.   Offloading to bilateral Heels.   Podiatry will follow while in house.  Will discuss care plan with attending.

## 2024-09-05 NOTE — CONSULT NOTE ADULT - ASSESSMENT
54m with dm, cad, pad, dvt atrial fib, hypertension, stroke, indwelling jacques, PE, indwelling jacques presents from wound care with  infected right heel ulcer  r/o osteomyelitis  leukocytosis   hypotension, atrial fib    plan  aztreonam  keep elevated  local care   i and d    rate control   trend wbc

## 2024-09-05 NOTE — ED ADULT NURSE NOTE - ED STAT RN HANDOFF DETAILS
Report given to RN Lewis, VS stable. Pt taken by ED transport and RN escort to ICU in stable condition.

## 2024-09-05 NOTE — ED PROVIDER NOTE - OBJECTIVE STATEMENT
55yo male bib ems from wound care with hypotension and poss gangrenous foot, being treated after having amputations, pt denies fever, chills, has no pain

## 2024-09-05 NOTE — CHART NOTE - NSCHARTNOTEFT_GEN_A_CORE
54M with PMhx of A-fib on Eliquis, indwelling jacques, CAD s/p stents, CVA, DM2, HTN, R heel OM s/p debridement, hx of PE who presented to the ED from wound care with hypotension, admitted for suspected right heel osteomyelitis, with ICU consulted for hypotension.    Patient initially with Afib with RVR and hypotension, s/p 2L  IVF. One exam, patient appears volume depleted. Labs significant for leukocytosis and mildly elevated lactate of 2.7. Foot xray reveals large soft tissue defect at the calcaneus and adjacent cortical irregularity, osteomyelitis cannot be excluded.     Patient is s/p 2 additional L of fluid with improvement in BP to systolic 100s-110s, and MAPs> 70.A Afib attempted to be controlled with diltiazem and diltiazem drip without success. Cariology recommendations appreciated for further fluid resuscitation and amiodarone. HRs improved to the 130s with maintenance of blood pressure. Patient alert and oriented, eating dinner. Reports he feels well. Continue with abx management and appreciated podiatry evaluation. Will require source control.    If unable to control HR or patient developed hypotension, please do not hesitate to call ICU. Thank you. 54M with PMhx of A-fib on Eliquis, indwelling jacques, CAD s/p stents, CVA, DM2, HTN, R heel OM s/p debridement, hx of PE who presented to the ED from wound care with hypotension, admitted for suspected right heel osteomyelitis, with ICU consulted for hypotension.    Patient initially with Afib with RVR and hypotension, s/p 2L  IVF. One exam, patient appears volume depleted. Labs significant for leukocytosis and mildly elevated lactate of 2.7. Foot xray reveals large soft tissue defect at the calcaneus and adjacent cortical irregularity, osteomyelitis cannot be excluded.     Patient is s/p 2 additional L of fluid with improvement in BP to systolic 100s-110s, and MAPs> 70.A Afib attempted to be controlled with diltiazem and diltiazem drip without success. Cariology recommendations appreciated for further fluid resuscitation and amiodarone. HRs improved to the 130s with maintenance of blood pressure. Patient alert and oriented, eating dinner. Reports he feels well. Continue with abx management and appreciated podiatry evaluation. Will require source control.    If unable to control HR or patient developed hypotension, please do not hesitate to call ICU. Thank you.    Update: Patient with BPs in 90s, MAPS sustaining > 65 however with persistent RVR. Will accept to ICU

## 2024-09-05 NOTE — ED ADULT NURSE REASSESSMENT NOTE - NS ED NURSE REASSESS COMMENT FT1
Cardizem titrated to 20mg/hr. MD mendosa notified and instructed to contact ICU for consult. MELISSA Stanley from ICU notified and instructed to stop cardizem and wait for new orders then reevaluate at a later time.
Pt came in with jacques - no output noted. Jacques changed. When jacques was removed blood clots noted and pt started to bleed moderately from penis. MD Garcia came to bedside and inserted new jacques. Bleeding from penis stopped and urine noted to be cloudy and foul smelling. Jacques remains in place. 900mL or urine noted. Pt resting in stretcher.
Report rec'd for pt in ED awaiting Tele bed for admission. VS hypotensive but stable for now, HR tachy at 120's A-Fib w/ occasional runs in the 130's. ICU PA aware, monitoring ongoing. NAD at this time. Bed in lowest position, call bell within reach. Pt across from RN station for closer monitoring. Safety maintained, monitoring ongoing.
Pt NAD at this time, repositioned for comfort and relief of pressure areas. Pt took meds PO uneventful. Amiodarone bag and rate changed per order. RN s/w resident and aware of current VS. Pt stable for now and will continue to monitor closely. RN informed pt that his sister called while he was sleeping to check on him. HR still stable but still has several episodes of increases to 130's to 140. Safety maintained, monitoring ongoing.

## 2024-09-06 ENCOUNTER — NON-APPOINTMENT (OUTPATIENT)
Age: 54
End: 2024-09-06

## 2024-09-06 LAB
A1C WITH ESTIMATED AVERAGE GLUCOSE RESULT: 6.8 % — HIGH (ref 4–5.6)
ALBUMIN SERPL ELPH-MCNC: 1.2 G/DL — LOW (ref 3.3–5)
ALP SERPL-CCNC: 144 U/L — HIGH (ref 40–120)
ALT FLD-CCNC: 19 U/L — SIGNIFICANT CHANGE UP (ref 12–78)
ANION GAP SERPL CALC-SCNC: 8 MMOL/L — SIGNIFICANT CHANGE UP (ref 5–17)
APTT BLD: 36.5 SEC — HIGH (ref 24.5–35.6)
AST SERPL-CCNC: 21 U/L — SIGNIFICANT CHANGE UP (ref 15–37)
BASOPHILS # BLD AUTO: 0.03 K/UL — SIGNIFICANT CHANGE UP (ref 0–0.2)
BASOPHILS NFR BLD AUTO: 0.3 % — SIGNIFICANT CHANGE UP (ref 0–2)
BILIRUB SERPL-MCNC: 0.5 MG/DL — SIGNIFICANT CHANGE UP (ref 0.2–1.2)
BUN SERPL-MCNC: 14 MG/DL — SIGNIFICANT CHANGE UP (ref 7–23)
CALCIUM SERPL-MCNC: 7.6 MG/DL — LOW (ref 8.5–10.1)
CHLORIDE SERPL-SCNC: 112 MMOL/L — HIGH (ref 96–108)
CO2 SERPL-SCNC: 23 MMOL/L — SIGNIFICANT CHANGE UP (ref 22–31)
CREAT SERPL-MCNC: 0.78 MG/DL — SIGNIFICANT CHANGE UP (ref 0.5–1.3)
EGFR: 105 ML/MIN/1.73M2 — SIGNIFICANT CHANGE UP
EOSINOPHIL # BLD AUTO: 0.24 K/UL — SIGNIFICANT CHANGE UP (ref 0–0.5)
EOSINOPHIL NFR BLD AUTO: 2.2 % — SIGNIFICANT CHANGE UP (ref 0–6)
ESTIMATED AVERAGE GLUCOSE: 148 MG/DL — HIGH (ref 68–114)
FERRITIN SERPL-MCNC: 472 NG/ML — HIGH (ref 30–400)
GLUCOSE BLDC GLUCOMTR-MCNC: 106 MG/DL — HIGH (ref 70–99)
GLUCOSE BLDC GLUCOMTR-MCNC: 109 MG/DL — HIGH (ref 70–99)
GLUCOSE BLDC GLUCOMTR-MCNC: 80 MG/DL — SIGNIFICANT CHANGE UP (ref 70–99)
GLUCOSE BLDC GLUCOMTR-MCNC: 83 MG/DL — SIGNIFICANT CHANGE UP (ref 70–99)
GLUCOSE BLDC GLUCOMTR-MCNC: 86 MG/DL — SIGNIFICANT CHANGE UP (ref 70–99)
GLUCOSE SERPL-MCNC: 74 MG/DL — SIGNIFICANT CHANGE UP (ref 70–99)
HCT VFR BLD CALC: 23.9 % — LOW (ref 39–50)
HCT VFR BLD CALC: 24.1 % — LOW (ref 39–50)
HGB BLD-MCNC: 7 G/DL — CRITICAL LOW (ref 13–17)
HGB BLD-MCNC: 7.3 G/DL — LOW (ref 13–17)
IMM GRANULOCYTES NFR BLD AUTO: 1.1 % — HIGH (ref 0–0.9)
INR BLD: 1.56 RATIO — HIGH (ref 0.85–1.18)
IRON SATN MFR SERPL: 10 UG/DL — LOW (ref 45–165)
IRON SATN MFR SERPL: 12 % — LOW (ref 16–55)
LYMPHOCYTES # BLD AUTO: 0.99 K/UL — LOW (ref 1–3.3)
LYMPHOCYTES # BLD AUTO: 9 % — LOW (ref 13–44)
MAGNESIUM SERPL-MCNC: 1.4 MG/DL — LOW (ref 1.6–2.6)
MCHC RBC-ENTMCNC: 27.6 PG — SIGNIFICANT CHANGE UP (ref 27–34)
MCHC RBC-ENTMCNC: 28.4 PG — SIGNIFICANT CHANGE UP (ref 27–34)
MCHC RBC-ENTMCNC: 29 GM/DL — LOW (ref 32–36)
MCHC RBC-ENTMCNC: 30.5 GM/DL — LOW (ref 32–36)
MCV RBC AUTO: 93 FL — SIGNIFICANT CHANGE UP (ref 80–100)
MCV RBC AUTO: 94.9 FL — SIGNIFICANT CHANGE UP (ref 80–100)
MONOCYTES # BLD AUTO: 0.94 K/UL — HIGH (ref 0–0.9)
MONOCYTES NFR BLD AUTO: 8.6 % — SIGNIFICANT CHANGE UP (ref 2–14)
MRSA PCR RESULT.: DETECTED
NEUTROPHILS # BLD AUTO: 8.64 K/UL — HIGH (ref 1.8–7.4)
NEUTROPHILS NFR BLD AUTO: 78.8 % — HIGH (ref 43–77)
NRBC # BLD: 0 /100 WBCS — SIGNIFICANT CHANGE UP (ref 0–0)
NRBC # BLD: 0 /100 WBCS — SIGNIFICANT CHANGE UP (ref 0–0)
PHOSPHATE SERPL-MCNC: 2.9 MG/DL — SIGNIFICANT CHANGE UP (ref 2.5–4.5)
PLATELET # BLD AUTO: 461 K/UL — HIGH (ref 150–400)
PLATELET # BLD AUTO: 470 K/UL — HIGH (ref 150–400)
POTASSIUM SERPL-MCNC: 3.9 MMOL/L — SIGNIFICANT CHANGE UP (ref 3.5–5.3)
POTASSIUM SERPL-SCNC: 3.9 MMOL/L — SIGNIFICANT CHANGE UP (ref 3.5–5.3)
PROT SERPL-MCNC: 4.3 G/DL — LOW (ref 6–8.3)
PROTHROM AB SERPL-ACNC: 17.6 SEC — HIGH (ref 9.5–13)
RBC # BLD: 2.54 M/UL — LOW (ref 4.2–5.8)
RBC # BLD: 2.57 M/UL — LOW (ref 4.2–5.8)
RBC # FLD: 16.6 % — HIGH (ref 10.3–14.5)
RBC # FLD: 16.6 % — HIGH (ref 10.3–14.5)
S AUREUS DNA NOSE QL NAA+PROBE: DETECTED
SODIUM SERPL-SCNC: 143 MMOL/L — SIGNIFICANT CHANGE UP (ref 135–145)
TIBC SERPL-MCNC: 85 UG/DL — LOW (ref 220–430)
UIBC SERPL-MCNC: 75 UG/DL — LOW (ref 110–370)
WBC # BLD: 10.46 K/UL — SIGNIFICANT CHANGE UP (ref 3.8–10.5)
WBC # BLD: 10.96 K/UL — HIGH (ref 3.8–10.5)
WBC # FLD AUTO: 10.46 K/UL — SIGNIFICANT CHANGE UP (ref 3.8–10.5)
WBC # FLD AUTO: 10.96 K/UL — HIGH (ref 3.8–10.5)

## 2024-09-06 PROCEDURE — 93926 LOWER EXTREMITY STUDY: CPT | Mod: 26,RT

## 2024-09-06 PROCEDURE — 99221 1ST HOSP IP/OBS SF/LOW 40: CPT

## 2024-09-06 PROCEDURE — 99233 SBSQ HOSP IP/OBS HIGH 50: CPT | Mod: GC

## 2024-09-06 PROCEDURE — 93306 TTE W/DOPPLER COMPLETE: CPT | Mod: 26

## 2024-09-06 PROCEDURE — 99291 CRITICAL CARE FIRST HOUR: CPT

## 2024-09-06 RX ORDER — DIGOXIN 0.12 MG/1
250 TABLET ORAL ONCE
Refills: 0 | Status: COMPLETED | OUTPATIENT
Start: 2024-09-06 | End: 2024-09-06

## 2024-09-06 RX ORDER — METOPROLOL TARTRATE 100 MG/1
5 TABLET ORAL ONCE
Refills: 0 | Status: COMPLETED | OUTPATIENT
Start: 2024-09-06 | End: 2024-09-06

## 2024-09-06 RX ORDER — ENOXAPARIN SODIUM 100 MG/ML
100 INJECTION SUBCUTANEOUS EVERY 12 HOURS
Refills: 0 | Status: DISCONTINUED | OUTPATIENT
Start: 2024-09-06 | End: 2024-09-10

## 2024-09-06 RX ORDER — OXYCODONE HYDROCHLORIDE 15 MG/1
TABLET ORAL
Refills: 0 | Status: DISCONTINUED | OUTPATIENT
Start: 2024-09-06 | End: 2024-09-10

## 2024-09-06 RX ORDER — MIDODRINE HYDROCHLORIDE 5 MG/1
15 TABLET ORAL THREE TIMES A DAY
Refills: 0 | Status: DISCONTINUED | OUTPATIENT
Start: 2024-09-06 | End: 2024-09-10

## 2024-09-06 RX ORDER — OXYCODONE HYDROCHLORIDE 15 MG/1
400 TABLET ORAL EVERY 8 HOURS
Refills: 0 | Status: DISCONTINUED | OUTPATIENT
Start: 2024-09-06 | End: 2024-09-10

## 2024-09-06 RX ORDER — MIDODRINE HYDROCHLORIDE 5 MG/1
10 TABLET ORAL THREE TIMES A DAY
Refills: 0 | Status: DISCONTINUED | OUTPATIENT
Start: 2024-09-06 | End: 2024-09-06

## 2024-09-06 RX ORDER — POTASSIUM CHLORIDE 10 MEQ
20 TABLET, EXT RELEASE, PARTICLES/CRYSTALS ORAL ONCE
Refills: 0 | Status: COMPLETED | OUTPATIENT
Start: 2024-09-06 | End: 2024-09-06

## 2024-09-06 RX ADMIN — Medication 325 MILLIGRAM(S): at 17:22

## 2024-09-06 RX ADMIN — SUCRALFATE 1 GRAM(S): 1 SUSPENSION ORAL at 05:31

## 2024-09-06 RX ADMIN — NALOXEGOL OXALATE 25 MILLIGRAM(S): 25 TABLET, FILM COATED ORAL at 11:53

## 2024-09-06 RX ADMIN — ENOXAPARIN SODIUM 100 MILLIGRAM(S): 100 INJECTION SUBCUTANEOUS at 17:23

## 2024-09-06 RX ADMIN — Medication 325 MILLIGRAM(S): at 05:31

## 2024-09-06 RX ADMIN — CHLORHEXIDINE GLUCONATE 1 APPLICATION(S): 40 SOLUTION TOPICAL at 06:47

## 2024-09-06 RX ADMIN — Medication 2 TABLET(S): at 21:15

## 2024-09-06 RX ADMIN — Medication 500 MILLIGRAM(S): at 11:52

## 2024-09-06 RX ADMIN — MIDODRINE HYDROCHLORIDE 5 MILLIGRAM(S): 5 TABLET ORAL at 16:44

## 2024-09-06 RX ADMIN — Medication 1000 UNIT(S): at 11:53

## 2024-09-06 RX ADMIN — Medication 25 GRAM(S): at 21:15

## 2024-09-06 RX ADMIN — DIGOXIN 250 MICROGRAM(S): 0.12 TABLET ORAL at 16:43

## 2024-09-06 RX ADMIN — TIOTROPIUM BROMIDE AND OLODATEROL 2 PUFF(S): 3.124; 2.736 SPRAY, METERED RESPIRATORY (INHALATION) at 07:55

## 2024-09-06 RX ADMIN — Medication 40 MILLIGRAM(S): at 21:15

## 2024-09-06 RX ADMIN — Medication 5 MILLIGRAM(S): at 21:14

## 2024-09-06 RX ADMIN — Medication 40 MILLIGRAM(S): at 06:41

## 2024-09-06 RX ADMIN — METOPROLOL TARTRATE 5 MILLIGRAM(S): 100 TABLET ORAL at 21:14

## 2024-09-06 RX ADMIN — FAMOTIDINE 20 MILLIGRAM(S): 10 INJECTION INTRAVENOUS at 11:53

## 2024-09-06 RX ADMIN — Medication 25 GRAM(S): at 17:23

## 2024-09-06 RX ADMIN — Medication 1 TABLET(S): at 11:53

## 2024-09-06 RX ADMIN — OXYCODONE HYDROCHLORIDE 10 MILLIGRAM(S): 5 TABLET ORAL at 18:13

## 2024-09-06 RX ADMIN — MIDODRINE HYDROCHLORIDE 5 MILLIGRAM(S): 5 TABLET ORAL at 05:32

## 2024-09-06 RX ADMIN — DIGOXIN 250 MICROGRAM(S): 0.12 TABLET ORAL at 21:14

## 2024-09-06 RX ADMIN — Medication 20 MILLIEQUIVALENT(S): at 16:43

## 2024-09-06 RX ADMIN — Medication 100 MILLIGRAM(S): at 17:23

## 2024-09-06 RX ADMIN — OXYCODONE HYDROCHLORIDE 16.7 MG/MIN: 15 TABLET ORAL at 13:49

## 2024-09-06 RX ADMIN — Medication 50 MILLIGRAM(S): at 13:10

## 2024-09-06 RX ADMIN — OXYCODONE HYDROCHLORIDE 10 MILLIGRAM(S): 5 TABLET ORAL at 17:22

## 2024-09-06 RX ADMIN — MIDODRINE HYDROCHLORIDE 5 MILLIGRAM(S): 5 TABLET ORAL at 11:53

## 2024-09-06 RX ADMIN — Medication 10 MILLIGRAM(S): at 16:43

## 2024-09-06 RX ADMIN — Medication 50 MILLIGRAM(S): at 17:23

## 2024-09-06 RX ADMIN — Medication 50 MILLIGRAM(S): at 05:32

## 2024-09-06 RX ADMIN — OXYCODONE HYDROCHLORIDE 10 MILLIGRAM(S): 5 TABLET ORAL at 06:47

## 2024-09-06 RX ADMIN — Medication 100 MILLIGRAM(S): at 05:32

## 2024-09-06 RX ADMIN — SUCRALFATE 1 GRAM(S): 1 SUSPENSION ORAL at 17:23

## 2024-09-06 RX ADMIN — POLYETHYLENE GLYCOL 3350 17 GRAM(S): 17 POWDER, FOR SOLUTION ORAL at 13:11

## 2024-09-06 RX ADMIN — OXYCODONE HYDROCHLORIDE 10 MILLIGRAM(S): 5 TABLET ORAL at 05:32

## 2024-09-06 NOTE — CONSULT NOTE ADULT - PROBLEM SELECTOR RECOMMENDATION 9
cont admelog corrective scale coverage qac/qhs  cont cons cho diet  metformin on hold  goal bg 100-180 in hosp setting

## 2024-09-06 NOTE — CONSULT NOTE ADULT - SUBJECTIVE AND OBJECTIVE BOX
Patient is a 54y old  Male who presents with a chief complaint of AF (05 Sep 2024 17:40)      Reason For Consult: dm2 uncontrolled    HPI:  53yo male bib ems from wound care with hypotension and poss gangrenous foot, being treated after having amputations, pt denies fever, chills, has no pain  In ER patient was found to be in hypotension and rapid AF.  patient is being admitted for further work up and treatment.   (05 Sep 2024 17:06)      PAST MEDICAL & SURGICAL HISTORY:  Diabetes      Diabetes mellitus with no complication      Afib      Hypertension      BPH (benign prostatic hyperplasia)      Perforated gastric ulcer  s/p emergent ex-lap omentopexy and plication 6/2019      Pulmonary embolism      History of non-ST elevation myocardial infarction (NSTEMI)      Osteomyelitis  s/p debridement      CAD S/P percutaneous coronary angioplasty      Cerebrovascular accident      H/O abdominal surgery      Perforated gastric ulcer      Traumatic amputation of left foot, initial encounter          FAMILY HISTORY:  FH: pulmonary embolism  Mother    FH: coronary artery disease  Father    FH: stroke  Father          Social History:    MEDICATIONS  (STANDING):  aMIOdarone Infusion 0.5 mG/Min (16.7 mL/Hr) IV Continuous <Continuous>  aMIOdarone Infusion 1 mG/Min (33.3 mL/Hr) IV Continuous <Continuous>  ascorbic acid 500 milliGRAM(s) Oral daily  atorvastatin 40 milliGRAM(s) Oral at bedtime  aztreonam  IVPB 1000 milliGRAM(s) IV Intermittent every 6 hours  cetirizine 10 milliGRAM(s) Oral daily  chlorhexidine 2% Cloths 1 Application(s) Topical <User Schedule>  cholecalciferol 1000 Unit(s) Oral daily  dextrose 5%. 1000 milliLiter(s) (100 mL/Hr) IV Continuous <Continuous>  dextrose 5%. 1000 milliLiter(s) (50 mL/Hr) IV Continuous <Continuous>  dextrose 50% Injectable 12.5 Gram(s) IV Push once  dextrose 50% Injectable 25 Gram(s) IV Push once  dextrose 50% Injectable 25 Gram(s) IV Push once  dextrose Oral Gel 15 Gram(s) Oral once  famotidine    Tablet 20 milliGRAM(s) Oral daily  ferrous    sulfate 325 milliGRAM(s) Oral two times a day  gabapentin 100 milliGRAM(s) Oral every 12 hours  glucagon  Injectable 1 milliGRAM(s) IntraMuscular once  insulin lispro (ADMELOG) corrective regimen sliding scale   SubCutaneous at bedtime  insulin lispro (ADMELOG) corrective regimen sliding scale   SubCutaneous three times a day before meals  lactated ringers. 1000 milliLiter(s) (100 mL/Hr) IV Continuous <Continuous>  melatonin 5 milliGRAM(s) Oral at bedtime  midodrine. 5 milliGRAM(s) Oral three times a day  multivitamin 1 Tablet(s) Oral daily  naloxegol 25 milliGRAM(s) Oral daily  oxybutynin XL 10 milliGRAM(s) Oral at bedtime  oxyCODONE    IR 10 milliGRAM(s) Oral two times a day  pantoprazole    Tablet 40 milliGRAM(s) Oral before breakfast  polyethylene glycol 3350 17 Gram(s) Oral daily  senna 2 Tablet(s) Oral at bedtime  sucralfate 1 Gram(s) Oral two times a day  tiotropium 2.5 MICROgram(s)/olodaterol 2.5 MICROgram(s) (STIOLTO) Inhaler 2 Puff(s) Inhalation daily    MEDICATIONS  (PRN):  acetaminophen     Tablet .. 650 milliGRAM(s) Oral every 6 hours PRN Temp greater or equal to 38C (100.4F)  sodium chloride 0.65% Nasal 1 Spray(s) Both Nostrils daily PRN Nasal Congestion        T(C): 36.4 (09-06-24 @ 07:54), Max: 37.7 (09-05-24 @ 09:50)  HR: 135 (09-06-24 @ 09:00) (92 - 156)  BP: 97/57 (09-06-24 @ 09:00) (66/49 - 136/68)  RR: 12 (09-06-24 @ 09:00) (12 - 26)  SpO2: 100% (09-06-24 @ 09:00) (95% - 100%)  Wt(kg): --    PHYSICAL EXAM:  GENERAL: NAD, well-groomed, well-developed  HEAD:  Atraumatic, Normocephalic  NECK: Supple, No JVD, Normal thyroid  CHEST/LUNG: Clear to percussion bilaterally; No rales, rhonchi, wheezing, or rubs  HEART: Regular rate and rhythm; No murmurs, rubs, or gallops  ABDOMEN: Soft, Nontender, Nondistended; Bowel sounds present  EXTREMITIES:  le foot dsg intact    CAPILLARY BLOOD GLUCOSE      POCT Blood Glucose.: 86 mg/dL (06 Sep 2024 08:14)  POCT Blood Glucose.: 83 mg/dL (06 Sep 2024 01:16)  POCT Blood Glucose.: 70 mg/dL (05 Sep 2024 22:26)  POCT Blood Glucose.: 104 mg/dL (05 Sep 2024 16:27)  POCT Blood Glucose.: 102 mg/dL (05 Sep 2024 09:55)                            7.0    10.46 )-----------( 461      ( 06 Sep 2024 05:46 )             24.1       CMP:  09-06 @ 05:46  SGPT 19  Albumin 1.2   Alk Phos 144   Anion Gap 8   SGOT 21   Total Bili 0.5   BUN 14   Calcium Total 7.6   CO2 23   Chloride 112   Creatinine 0.78   eGFR if AA --   eGFR if non AA --   Glucose 74   Potassium 3.9   Protein 4.3   Sodium 143      Thyroid Function Tests:  09-05 @ 10:25 TSH 6.01 FreeT4 -- T3 -- Anti TPO -- Anti Thyroglobulin Ab -- TSI --      Diabetes Tests:       Radiology:

## 2024-09-06 NOTE — CONSULT NOTE ADULT - NS ATTEND BILL GEN_ALL_CORE
Attending to bill Hatchet Flap Text: The defect edges were debeveled with a #15 scalpel blade.  Given the location of the defect, shape of the defect and the proximity to free margins a hatchet flap was deemed most appropriate.  Using a sterile surgical marker, an appropriate hatchet flap was drawn incorporating the defect and placing the expected incisions within the relaxed skin tension lines where possible.    The area thus outlined was incised deep to adipose tissue with a #15 scalpel blade.  The skin margins were undermined to an appropriate distance in all directions utilizing iris scissors.

## 2024-09-06 NOTE — CONSULT NOTE ADULT - NS ATTEND AMEND GEN_ALL_CORE FT
heel wound with surrounding gas  podiatry to perform incision and drainage for source control  can proceed with procedure, will get noninvasive studies after

## 2024-09-06 NOTE — PROGRESS NOTE ADULT - SUBJECTIVE AND OBJECTIVE BOX
INTERVAL HPI/OVERNIGHT EVENTS: No acute overnight events occurred.    SUBJECTIVE: Seen and examined pt at bedside. Has no acute complaints at this time.    Review of Systems:  Constitutional: No fever, chills, fatigue  Neuro: No headache, numbness, weakness  Resp: No cough, wheezing, shortness of breath  CVS: No chest pain, palpitations, leg swelling  GI: No abdominal pain, nausea, vomiting, diarrhea   : No dysuria, frequency, incontinence  Skin: No itching, burning, rashes, or lesions   Msk: No joint pain or swelling  Psych: No depression, anxiety, mood swings    ICU Vital Signs Last 24 Hrs  T(C): 36.5 (06 Sep 2024 11:50), Max: 37.2 (05 Sep 2024 16:19)  T(F): 97.7 (06 Sep 2024 11:50), Max: 98.9 (05 Sep 2024 16:19)  HR: 143 (06 Sep 2024 12:15) (92 - 156)  BP: 109/81 (06 Sep 2024 12:00) (80/64 - 109/81)  BP(mean): 89 (06 Sep 2024 12:00) (62 - 89)  ABP: --  ABP(mean): --  RR: 12 (06 Sep 2024 12:15) (12 - 22)  SpO2: 98% (06 Sep 2024 12:15) (95% - 100%)    O2 Parameters below as of 05 Sep 2024 23:52  Patient On (Oxygen Delivery Method): room air              09-05-24 @ 07:01  -  09-06-24 @ 07:00  --------------------------------------------------------  IN: 833.6 mL / OUT: 665 mL / NET: 168.6 mL    09-06-24 @ 07:01  -  09-06-24 @ 14:55  --------------------------------------------------------  IN: 583.5 mL / OUT: 230 mL / NET: 353.5 mL        CAPILLARY BLOOD GLUCOSE      POCT Blood Glucose.: 106 mg/dL (06 Sep 2024 12:00)      I&O's Summary    05 Sep 2024 07:01  -  06 Sep 2024 07:00  --------------------------------------------------------  IN: 833.6 mL / OUT: 665 mL / NET: 168.6 mL    06 Sep 2024 07:01  -  06 Sep 2024 14:55  --------------------------------------------------------  IN: 583.5 mL / OUT: 230 mL / NET: 353.5 mL        PHYSICAL EXAM:  General: NAD  Neurology: awake and alert  HEENT: NC/AT  Respiratory: CTA b/l, no rales or rhonchi noted  Cardiovascular: RRR, normal S1S2, no M/R/G  Abdomen: soft, NT/ND, +BS, no palpable masses  Extremities: WWP, no clubbing, cyanosis, or edema  Skin: warm/dry      Meds:  aztreonam  IVPB IV Intermittent    aMIOdarone Infusion IV Continuous  aMIOdarone Infusion IV Continuous  midodrine. Oral    atorvastatin Oral  dextrose 50% Injectable IV Push  dextrose 50% Injectable IV Push  dextrose 50% Injectable IV Push  dextrose Oral Gel Oral  glucagon  Injectable IntraMuscular  insulin lispro (ADMELOG) corrective regimen sliding scale SubCutaneous  insulin lispro (ADMELOG) corrective regimen sliding scale SubCutaneous    cetirizine Oral  tiotropium 2.5 MICROgram(s)/olodaterol 2.5 MICROgram(s) (STIOLTO) Inhaler Inhalation    acetaminophen     Tablet .. Oral PRN  gabapentin Oral  melatonin Oral  oxyCODONE    IR Oral        famotidine    Tablet Oral  naloxegol Oral  pantoprazole    Tablet Oral  polyethylene glycol 3350 Oral  senna Oral  sucralfate Oral    oxybutynin XL Oral    ascorbic acid Oral  cholecalciferol Oral  dextrose 5%. IV Continuous  dextrose 5%. IV Continuous  ferrous    sulfate Oral  lactated ringers. IV Continuous  multivitamin Oral      chlorhexidine 2% Cloths Topical  sodium chloride 0.65% Nasal Both Nostrils PRN                              7.3    10.96 )-----------( 470      ( 06 Sep 2024 12:14 )             23.9       09-06    143  |  112<H>  |  14  ----------------------------<  74  3.9   |  23  |  0.78    Ca    7.6<L>      06 Sep 2024 05:46  Phos  2.9     09-06  Mg     1.4     09-06    TPro  4.3<L>  /  Alb  1.2<L>  /  TBili  0.5  /  DBili  x   /  AST  21  /  ALT  19  /  AlkPhos  144<H>  09-06    Lactate 1.1           09-05 @ 19:35          PT/INR - ( 06 Sep 2024 05:46 )   PT: 17.6 sec;   INR: 1.56 ratio         PTT - ( 06 Sep 2024 05:46 )  PTT:36.5 sec  Urinalysis Basic - ( 06 Sep 2024 05:46 )    Color: x / Appearance: x / SG: x / pH: x  Gluc: 74 mg/dL / Ketone: x  / Bili: x / Urobili: x   Blood: x / Protein: x / Nitrite: x   Leuk Esterase: x / RBC: x / WBC x   Sq Epi: x / Non Sq Epi: x / Bacteria: x      Tissue Other, Wound --   Rare polymorphonuclear leukocytes per low power field  Numerous Gram Negative Rods per oil power field  Moderate Gram Positive Cocci per oil power field 09-05 @ 09:08              Radiology:    Bedside Ultrasound:    Tubes/Lines:      GLOBAL ISSUE/BEST PRACTICE:  Analgesia:  Sedation:  HOB elevation: Y  Stress ulcer prophylaxis:  VTE prophylaxis:  Glycemic control:  Nutrition:    CODE STATUS:        INTERVAL HPI/OVERNIGHT EVENTS: Rapid response called for patient for Afib RVR, Hypotension likely due to sepsis.      SUBJECTIVE: Seen and examined pt at bedside. States he feels about the same as yesterday.    ICU Vital Signs Last 24 Hrs  T(C): 36.5 (06 Sep 2024 11:50), Max: 37.2 (05 Sep 2024 16:19)  T(F): 97.7 (06 Sep 2024 11:50), Max: 98.9 (05 Sep 2024 16:19)  HR: 143 (06 Sep 2024 12:15) (92 - 156)  BP: 109/81 (06 Sep 2024 12:00) (80/64 - 109/81)  BP(mean): 89 (06 Sep 2024 12:00) (62 - 89)  ABP: --  ABP(mean): --  RR: 12 (06 Sep 2024 12:15) (12 - 22)  SpO2: 98% (06 Sep 2024 12:15) (95% - 100%)    O2 Parameters below as of 05 Sep 2024 23:52  Patient On (Oxygen Delivery Method): room air              09-05-24 @ 07:01  -  09-06-24 @ 07:00  --------------------------------------------------------  IN: 833.6 mL / OUT: 665 mL / NET: 168.6 mL    09-06-24 @ 07:01  -  09-06-24 @ 14:55  --------------------------------------------------------  IN: 583.5 mL / OUT: 230 mL / NET: 353.5 mL        CAPILLARY BLOOD GLUCOSE      POCT Blood Glucose.: 106 mg/dL (06 Sep 2024 12:00)      I&O's Summary    05 Sep 2024 07:01  -  06 Sep 2024 07:00  --------------------------------------------------------  IN: 833.6 mL / OUT: 665 mL / NET: 168.6 mL    06 Sep 2024 07:01  -  06 Sep 2024 14:55  --------------------------------------------------------  IN: 583.5 mL / OUT: 230 mL / NET: 353.5 mL        Physical Examination:  General: No acute distress.    PULM: Clear to auscultation bilaterally  CVS: Tachycardic, irregular   ABD: Soft, nondistended, nontender  EXT: L foot toes amputation, R heel wound with dressing in place   SKIN: Warm and well perfused, no rashes noted.  NEURO: Alert, oriented, interactive, nonfocal      Meds:  aztreonam  IVPB IV Intermittent  aMIOdarone Infusion IV Continuous  aMIOdarone Infusion IV Continuous  midodrine. Oral  atorvastatin Oral  dextrose 50% Injectable IV Push  dextrose 50% Injectable IV Push  dextrose 50% Injectable IV Push  dextrose Oral Gel Oral  glucagon  Injectable IntraMuscular  insulin lispro (ADMELOG) corrective regimen sliding scale SubCutaneous  insulin lispro (ADMELOG) corrective regimen sliding scale SubCutaneous  cetirizine Oral  tiotropium 2.5 MICROgram(s)/olodaterol 2.5 MICROgram(s) (STIOLTO) Inhaler Inhalation  acetaminophen     Tablet .. Oral PRN  gabapentin Oral  melatonin Oral  oxyCODONE    IR Oral  famotidine    Tablet Oral  naloxegol Oral  pantoprazole    Tablet Oral  polyethylene glycol 3350 Oral  senna Oral  sucralfate Oral  oxybutynin XL Oral  ascorbic acid Oral  cholecalciferol Oral  dextrose 5%. IV Continuous  dextrose 5%. IV Continuous  ferrous    sulfate Oral  lactated ringers. IV Continuous  multivitamin Oral  chlorhexidine 2% Cloths Topical  sodium chloride 0.65% Nasal Both Nostrils PRN                              7.3    10.96 )-----------( 470      ( 06 Sep 2024 12:14 )             23.9       09-06    143  |  112<H>  |  14  ----------------------------<  74  3.9   |  23  |  0.78    Ca    7.6<L>      06 Sep 2024 05:46  Phos  2.9     09-06  Mg     1.4     09-06    TPro  4.3<L>  /  Alb  1.2<L>  /  TBili  0.5  /  DBili  x   /  AST  21  /  ALT  19  /  AlkPhos  144<H>  09-06    Lactate 1.1           09-05 @ 19:35          PT/INR - ( 06 Sep 2024 05:46 )   PT: 17.6 sec;   INR: 1.56 ratio         PTT - ( 06 Sep 2024 05:46 )  PTT:36.5 sec  Urinalysis Basic - ( 06 Sep 2024 05:46 )    Color: x / Appearance: x / SG: x / pH: x  Gluc: 74 mg/dL / Ketone: x  / Bili: x / Urobili: x   Blood: x / Protein: x / Nitrite: x   Leuk Esterase: x / RBC: x / WBC x   Sq Epi: x / Non Sq Epi: x / Bacteria: x      Tissue Other, Wound --   Rare polymorphonuclear leukocytes per low power field  Numerous Gram Negative Rods per oil power field  Moderate Gram Positive Cocci per oil power field 09-05 @ 09:08              Radiology:    Bedside Ultrasound:    Tubes/Lines:      GLOBAL ISSUE/BEST PRACTICE:  Analgesia:  Sedation:  HOB elevation: Y  Stress ulcer prophylaxis:  VTE prophylaxis:  Glycemic control:  Nutrition:    CODE STATUS:        INTERVAL HPI/OVERNIGHT EVENTS: Rapid response called for patient for Afib RVR, and transferred to ICU, Hypotension likely due to sepsis.      SUBJECTIVE: Seen and examined pt at bedside. States he feels about the same as yesterday.    ICU Vital Signs Last 24 Hrs  T(C): 36.5 (06 Sep 2024 11:50), Max: 37.2 (05 Sep 2024 16:19)  T(F): 97.7 (06 Sep 2024 11:50), Max: 98.9 (05 Sep 2024 16:19)  HR: 143 (06 Sep 2024 12:15) (92 - 156)  BP: 109/81 (06 Sep 2024 12:00) (80/64 - 109/81)  BP(mean): 89 (06 Sep 2024 12:00) (62 - 89)  ABP: --  ABP(mean): --  RR: 12 (06 Sep 2024 12:15) (12 - 22)  SpO2: 98% (06 Sep 2024 12:15) (95% - 100%)    O2 Parameters below as of 05 Sep 2024 23:52  Patient On (Oxygen Delivery Method): room air              09-05-24 @ 07:01  -  09-06-24 @ 07:00  --------------------------------------------------------  IN: 833.6 mL / OUT: 665 mL / NET: 168.6 mL    09-06-24 @ 07:01  -  09-06-24 @ 14:55  --------------------------------------------------------  IN: 583.5 mL / OUT: 230 mL / NET: 353.5 mL        CAPILLARY BLOOD GLUCOSE      POCT Blood Glucose.: 106 mg/dL (06 Sep 2024 12:00)      I&O's Summary    05 Sep 2024 07:01  -  06 Sep 2024 07:00  --------------------------------------------------------  IN: 833.6 mL / OUT: 665 mL / NET: 168.6 mL    06 Sep 2024 07:01  -  06 Sep 2024 14:55  --------------------------------------------------------  IN: 583.5 mL / OUT: 230 mL / NET: 353.5 mL        Physical Examination:  General: No acute distress.    PULM: Clear to auscultation bilaterally  CVS: Tachycardic, irregular   ABD: Soft, nondistended, nontender  EXT: L foot toes amputation, R heel wound with dressing in place   SKIN: Warm and well perfused, no rashes noted.  NEURO: Alert, oriented, interactive, nonfocal      Meds:  aztreonam  IVPB IV Intermittent  aMIOdarone Infusion IV Continuous  aMIOdarone Infusion IV Continuous  midodrine. Oral  atorvastatin Oral  dextrose 50% Injectable IV Push  dextrose 50% Injectable IV Push  dextrose 50% Injectable IV Push  dextrose Oral Gel Oral  glucagon  Injectable IntraMuscular  insulin lispro (ADMELOG) corrective regimen sliding scale SubCutaneous  insulin lispro (ADMELOG) corrective regimen sliding scale SubCutaneous  cetirizine Oral  tiotropium 2.5 MICROgram(s)/olodaterol 2.5 MICROgram(s) (STIOLTO) Inhaler Inhalation  acetaminophen     Tablet .. Oral PRN  gabapentin Oral  melatonin Oral  oxyCODONE    IR Oral  famotidine    Tablet Oral  naloxegol Oral  pantoprazole    Tablet Oral  polyethylene glycol 3350 Oral  senna Oral  sucralfate Oral  oxybutynin XL Oral  ascorbic acid Oral  cholecalciferol Oral  dextrose 5%. IV Continuous  dextrose 5%. IV Continuous  ferrous    sulfate Oral  lactated ringers. IV Continuous  multivitamin Oral  chlorhexidine 2% Cloths Topical  sodium chloride 0.65% Nasal Both Nostrils PRN                              7.3    10.96 )-----------( 470      ( 06 Sep 2024 12:14 )             23.9       09-06    143  |  112<H>  |  14  ----------------------------<  74  3.9   |  23  |  0.78    Ca    7.6<L>      06 Sep 2024 05:46  Phos  2.9     09-06  Mg     1.4     09-06    TPro  4.3<L>  /  Alb  1.2<L>  /  TBili  0.5  /  DBili  x   /  AST  21  /  ALT  19  /  AlkPhos  144<H>  09-06    Lactate 1.1           09-05 @ 19:35          PT/INR - ( 06 Sep 2024 05:46 )   PT: 17.6 sec;   INR: 1.56 ratio         PTT - ( 06 Sep 2024 05:46 )  PTT:36.5 sec  Urinalysis Basic - ( 06 Sep 2024 05:46 )    Color: x / Appearance: x / SG: x / pH: x  Gluc: 74 mg/dL / Ketone: x  / Bili: x / Urobili: x   Blood: x / Protein: x / Nitrite: x   Leuk Esterase: x / RBC: x / WBC x   Sq Epi: x / Non Sq Epi: x / Bacteria: x      Tissue Other, Wound --   Rare polymorphonuclear leukocytes per low power field  Numerous Gram Negative Rods per oil power field  Moderate Gram Positive Cocci per oil power field 09-05 @ 09:08        Radiology:  < from: [facility] Duplex Low Ext Arterial, Ltd, Right (09.06.24 @ 12:40) >  RIGHT:  CFA: Monophasic; 84  Proximal SFA: Monophasic;77  Mid SFA: Monophasic; 81  Distal SFA: Monophasic;  62  Popliteal: Monophasic;  80, 93  Anterior tibial: Monophasic; 87, 87, 85  Posterior tibial: Not seen;  Peroneal: Not seen;  Dorsalis pedis: Monophasic;  10    IMPRESSION:  *  Diffuse abnormal monophasic flow throughout the right leg.  *  Posterior tibial and peroneal arteries are not seen. Occlusion is not   excluded.    --- End of Report ---    < end of copied text >  < from: Xray Foot AP + Lateral + Oblique, Right (09.05.24 @ 12:52) >  FINDINGS: Large soft tissue defect at the calcaneus with surrounding soft   tissue emphysema.    Cortical irregularity at the posterior calcaneus, osteomyelitis cannot be   excluded.    IMPRESSION:    Large soft tissue defect at the calcaneus and adjacent cortical   irregularity, osteomyelitis cannot be excluded. Follow-up MRI can be   ordered as clinically indicated    --- End of Report ---    < end of copied text >  < from: Xray Chest 1 View-PORTABLE IMMEDIATE (09.05.24 @ 11:20) >  INTERPRETATION:  An AP portable semiupright chest radiograph was   performed for shortness of breath, cough and fever.    Comparison is made to 6/18/2024.    This study excludes the peripheral left lung base and left costophrenic   angle. No infiltrates are seen. There is no pneumothorax. No pleural   fluid is identified. The hilar and mediastinal structures cannot be   accurately assessed with the patient rotated to the left. The cardiac   silhouette is not enlarged for the projection and there is no pulmonary   edema. There are degenerative changes of the thoracic spine.    IMPRESSION: Limited survey with no acute abnormality seen..    --- End of Report ---    < end of copied text >  < from: TTE W or WO Ultrasound Enhancing Agent (09.05.24 @ 20:04) >  CONCLUSIONS:      1. Technically difficult image quality.   2. Left ventricular cavity is normal in size. Left ventricular wall thickness is normal. Left ventricular systolic function is normal withan ejection fraction of 69 % by Castañeda's method of disks. There are no regional wall motion abnormalities seen.   3. There is increased LV mass and concentric hypertrophy.   4. Normal right ventricular cavity size and normal right ventricular systolic function.   5. Left atrium is severely dilated.   6. The right atrium is severely dilated.   7. There is calcification of the mitral valve annulus.   8. Mitral valve leaflets are diffusely calcified.   9. Moderate to severe mitral regurgitation.  10. Trileaflet aortic valve with normal systolic excursion. There is calcification of the aortic valve leaflets.  11. Mild tricuspid regurgitation.  12. Estimated pulmonary artery systolic pressure is 39 mmHg, consistent with borderline pulmonary hypertension.  13. The inferior vena cava is normal in size measuring 1.75 cm in diameter, (normal <2.1cm) with normal inspiratory collapse (normal >50%) consistent with normal right atrial pressure (~3, range 0-5mmHg).    < end of copied text >      Tubes/Lines: jacques      GLOBAL ISSUE/BEST PRACTICE:  Analgesia: Y  Sedation: NA  HOB elevation: Y  Stress ulcer prophylaxis: Y  VTE prophylaxis: Y  Glycemic control: Y  Nutrition: Y    CODE STATUS:  FULL

## 2024-09-06 NOTE — PROGRESS NOTE ADULT - SUBJECTIVE AND OBJECTIVE BOX
Date of Service 09-06-24 @ 20:54    Patient is a 54y old  Male who presents with a chief complaint of AF (06 Sep 2024 20:20)      INTERVAL /OVERNIGHT EVENTS: feels weak     MEDICATIONS  (STANDING):  aMIOdarone Infusion 0.5 mG/Min (16.7 mL/Hr) IV Continuous <Continuous>  aMIOdarone Infusion 1 mG/Min (33.3 mL/Hr) IV Continuous <Continuous>  ascorbic acid 500 milliGRAM(s) Oral daily  atorvastatin 40 milliGRAM(s) Oral at bedtime  aztreonam  IVPB 1000 milliGRAM(s) IV Intermittent every 6 hours  cetirizine 10 milliGRAM(s) Oral daily  chlorhexidine 2% Cloths 1 Application(s) Topical <User Schedule>  cholecalciferol 1000 Unit(s) Oral daily  dextrose 5%. 1000 milliLiter(s) (100 mL/Hr) IV Continuous <Continuous>  dextrose 5%. 1000 milliLiter(s) (50 mL/Hr) IV Continuous <Continuous>  dextrose 50% Injectable 12.5 Gram(s) IV Push once  dextrose 50% Injectable 25 Gram(s) IV Push once  dextrose 50% Injectable 25 Gram(s) IV Push once  dextrose Oral Gel 15 Gram(s) Oral once  digoxin  Injectable 250 MICROGram(s) IV Push once  enoxaparin Injectable 100 milliGRAM(s) SubCutaneous every 12 hours  famotidine    Tablet 20 milliGRAM(s) Oral daily  ferrous    sulfate 325 milliGRAM(s) Oral two times a day  gabapentin 100 milliGRAM(s) Oral every 12 hours  glucagon  Injectable 1 milliGRAM(s) IntraMuscular once  insulin lispro (ADMELOG) corrective regimen sliding scale   SubCutaneous three times a day before meals  insulin lispro (ADMELOG) corrective regimen sliding scale   SubCutaneous at bedtime  lactated ringers. 1000 milliLiter(s) (100 mL/Hr) IV Continuous <Continuous>  magnesium sulfate  IVPB 2 Gram(s) IV Intermittent once  melatonin 5 milliGRAM(s) Oral at bedtime  metoprolol tartrate Injectable 5 milliGRAM(s) IV Push once  midodrine. 15 milliGRAM(s) Oral three times a day  multivitamin 1 Tablet(s) Oral daily  naloxegol 25 milliGRAM(s) Oral daily  oxybutynin XL 10 milliGRAM(s) Oral at bedtime  oxyCODONE    IR 10 milliGRAM(s) Oral two times a day  pantoprazole    Tablet 40 milliGRAM(s) Oral before breakfast  polyethylene glycol 3350 17 Gram(s) Oral daily  senna 2 Tablet(s) Oral at bedtime  sucralfate 1 Gram(s) Oral two times a day  tiotropium 2.5 MICROgram(s)/olodaterol 2.5 MICROgram(s) (STIOLTO) Inhaler 2 Puff(s) Inhalation daily    MEDICATIONS  (PRN):  acetaminophen     Tablet .. 650 milliGRAM(s) Oral every 6 hours PRN Temp greater or equal to 38C (100.4F)  sodium chloride 0.65% Nasal 1 Spray(s) Both Nostrils daily PRN Nasal Congestion      Allergies    fish (Hives)  ertapenem (Blisters; Rash)    Intolerances        REVIEW OF SYSTEMS:  CONSTITUTIONAL: No fever, weight loss, or fatigue  EYES: No eye pain, visual disturbances, or discharge  ENMT:  No difficulty hearing, tinnitus, vertigo; No sinus or throat pain  NECK: No pain or stiffness  RESPIRATORY: No cough, wheezing, chills or hemoptysis; No shortness of breath  CARDIOVASCULAR: No chest pain, palpitations, dizziness, or leg swelling  GASTROINTESTINAL: No abdominal or epigastric pain. No nausea, vomiting, or hematemesis; No diarrhea or constipation. No melena or hematochezia.  GENITOURINARY: No dysuria, frequency, hematuria, or incontinence  NEUROLOGICAL: No headaches, memory loss, loss of strength, numbness, or tremors  SKIN: No itching, burning, rashes, or lesions   LYMPH NODES: No enlarged glands  ENDOCRINE: No heat or cold intolerance; No hair loss; No polydipsia or polyuria  MUSCULOSKELETAL: No joint pain or swelling; No muscle, back, or extremity pain  PSYCHIATRIC: No depression, anxiety, mood swings, or difficulty sleeping  HEME/LYMPH: No easy bruising, or bleeding gums  ALLERGY AND IMMUNOLOGIC: No hives or eczema    Vital Signs Last 24 Hrs  T(C): 36.8 (06 Sep 2024 19:48), Max: 36.8 (06 Sep 2024 19:48)  T(F): 98.2 (06 Sep 2024 19:48), Max: 98.2 (06 Sep 2024 19:48)  HR: 133 (06 Sep 2024 19:00) (109 - 144)  BP: 124/82 (06 Sep 2024 19:00) (83/56 - 129/75)  BP(mean): 98 (06 Sep 2024 19:00) (62 - 98)  RR: 16 (06 Sep 2024 19:00) (11 - 23)  SpO2: 90% (06 Sep 2024 19:00) (84% - 100%)    Parameters below as of 05 Sep 2024 23:52  Patient On (Oxygen Delivery Method): room air        PHYSICAL EXAM:  GENERAL: NAD, well-groomed, well-developed  HEAD:  Atraumatic, Normocephalic  EYES: EOMI, PERRLA, conjunctiva and sclera clear  ENMT: No tonsillar erythema, exudates, or enlargement; Moist mucous membranes, Good dentition, No lesions  NECK: Supple, No JVD, Normal thyroid  NERVOUS SYSTEM:  Alert & Oriented X3, Good concentration; Motor Strength 5/5 B/L upper and lower extremities; DTRs 2+ intact and symmetric  CHEST/LUNG: Clear to auscultation bilaterally; No rales, rhonchi, wheezing, or rubs  HEART: Regular rate and rhythm; No murmurs, rubs, or gallops  ABDOMEN: Soft, Nontender, Nondistended; Bowel sounds present  EXTREMITIES:  right heel wound  LYMPH: No lymphadenopathy noted  SKIN: No rashes or lesions    LABS:                        7.3    10.96 )-----------( 470      ( 06 Sep 2024 12:14 )             23.9     06 Sep 2024 05:46    143    |  112    |  14     ----------------------------<  74     3.9     |  23     |  0.78     Ca    7.6        06 Sep 2024 05:46  Phos  2.9       06 Sep 2024 05:46  Mg     1.4       06 Sep 2024 05:46    TPro  4.3    /  Alb  1.2    /  TBili  0.5    /  DBili  x      /  AST  21     /  ALT  19     /  AlkPhos  144    06 Sep 2024 05:46    PT/INR - ( 06 Sep 2024 05:46 )   PT: 17.6 sec;   INR: 1.56 ratio         PTT - ( 06 Sep 2024 05:46 )  PTT:36.5 sec  Urinalysis Basic - ( 06 Sep 2024 05:46 )    Color: x / Appearance: x / SG: x / pH: x  Gluc: 74 mg/dL / Ketone: x  / Bili: x / Urobili: x   Blood: x / Protein: x / Nitrite: x   Leuk Esterase: x / RBC: x / WBC x   Sq Epi: x / Non Sq Epi: x / Bacteria: x      CAPILLARY BLOOD GLUCOSE      POCT Blood Glucose.: 80 mg/dL (06 Sep 2024 16:48)  POCT Blood Glucose.: 106 mg/dL (06 Sep 2024 12:00)  POCT Blood Glucose.: 86 mg/dL (06 Sep 2024 08:14)  POCT Blood Glucose.: 83 mg/dL (06 Sep 2024 01:16)  POCT Blood Glucose.: 70 mg/dL (05 Sep 2024 22:26)      RADIOLOGY & ADDITIONAL TESTS:    Notes Reviewed:  [x ] YES  [ ] NO    Care Discussed with Consultants/Other Providers [x ] YES  [ ] NO

## 2024-09-06 NOTE — CONSULT NOTE ADULT - ASSESSMENT
55yo male with h/o AF, indwelling Aguilera, CAD s/p stents, CVA, DMT 2, Hypertension, osteomyelitis s/p debridement, bib ems from wound care with hypotension and poss gangrenous foot  Vascular surgery consulted  Podiatry notes- will need surgical intervention with debridement and bone biopsy of the right heel. Patient is not hemodynamically stable for surgical intervention at this time.   From a vascular perspective pt may proceed with podiatry for surgical intervention for source control  Discussed high likelihood that pt may require a more proximal amputation during this hospitalization; he does not wish to move forward with this until all other options are attempted  Will obtain arterial duplex at this time for further assessment  Discussed with Dr Hardin

## 2024-09-06 NOTE — PATIENT PROFILE ADULT - FUNCTIONAL ASSESSMENT - BASIC MOBILITY 6.
1-calculated by average/Not able to assess (calculate score using [facility] averaging method)

## 2024-09-06 NOTE — CARE COORDINATION ASSESSMENT. - NSCAREPROVIDERS_GEN_ALL_CORE_FT
CARE PROVIDERS:  Accepting Physician: Hill Brizuela  Access Services: Saeed Chandra  Administration: Venu Estrada  Administration: Luis Carlos Main  Admitting: Hill Brizuela  Attending: Hill Brizuela  Consultant: Claudette Jacobs  Consultant: Symone Hickey  Consultant: Perlman, Craig  Consultant: Tessa Jewell  Consultant: Mounika Bay  Consultant: Pascual Salazar  Consultant: Nery Alex  Consultant: Jay Carmichael  Consultant: Lui Fox  Consultant: Dilcia Sin  Consultant: Jeovany Pineda  Consultant: Cristi Mcbride  Covering Team: Christiano Barraza  ED Attending: Ashley Chaudhary  ED Nurse: Ed Faith  Nurse: Jaymie Acosta  Nurse: BALBIR Baron  Nurse: Luz Ann  Nurse: Riri Alba  Nurse: Claude Barrios  Nurse: Adeline Vance  Nurse: Piper Adam  Ordered: Doctor, Unknown  Ordered: ServiceAccount, [facility]  Outpatient Provider: Grisel Murray  Override: Chaz Saucedo  Override: Tamara Mancera  PCA/Nursing Assistant: Aranza Arnett  Primary Team: Ananya Pineda  Primary Team: Lazaro Quiñonez  Primary Team: Edouard Shea  Primary Team: Ed Cárdenas  Primary Team: Summer Salazar  Primary Team: Luc Patel  Primary Team: River Alford  Primary Team: Hill Brizuela  Primary Team: Jeovany Lu  Primary Team: Vickie Muniz  Primary Team: Ashley Hardin  Primary Team: Darline Li  Primary Team: David Hughes  Registered Dietitian: Bebe Anna  Registered Dietitian: Janneth Prado  : Radha Riggins// Supp. Assoc.: Dk Lim

## 2024-09-06 NOTE — PATIENT PROFILE ADULT - FALL HARM RISK - HARM RISK INTERVENTIONS
Assistance with ambulation/Assistance OOB with selected safe patient handling equipment/Communicate Risk of Fall with Harm to all staff/Discuss with provider need for PT consult/Monitor gait and stability/Reinforce activity limits and safety measures with patient and family/Tailored Fall Risk Interventions/Visual Cue: Yellow wristband and red socks/Bed in lowest position, wheels locked, appropriate side rails in place/Call bell, personal items and telephone in reach/Instruct patient to call for assistance before getting out of bed or chair/Non-slip footwear when patient is out of bed/Henry to call system/Physically safe environment - no spills, clutter or unnecessary equipment/Purposeful Proactive Rounding/Room/bathroom lighting operational, light cord in reach

## 2024-09-06 NOTE — PROGRESS NOTE ADULT - SUBJECTIVE AND OBJECTIVE BOX
[facility], Division of Infectious   Dr Whitten, Dr Hickey, Dr Li, MATT Ramsey Ousmane  [phone]  after hours and weekends [phone]    Name: SARAH MORRISON  Age: 54y  Gender: Male  MRN: [id number]    Interval History--  Notes reviewed  feels the same    Allergies    fish (Hives)  ertapenem (Blisters; Rash)    Intolerances        Medications--  Antibiotics:  aztreonam  IVPB 1000 milliGRAM(s) IV Intermittent every 6 hours    Immunologic:    Other:  acetaminophen     Tablet .. PRN  aMIOdarone Infusion  aMIOdarone Infusion  ascorbic acid  atorvastatin  cetirizine  chlorhexidine 2% Cloths  cholecalciferol  dextrose 5%.  dextrose 5%.  dextrose 50% Injectable  dextrose 50% Injectable  dextrose 50% Injectable  dextrose Oral Gel  digoxin  Injectable  enoxaparin Injectable  famotidine    Tablet  ferrous    sulfate  gabapentin  glucagon  Injectable  insulin lispro (ADMELOG) corrective regimen sliding scale  insulin lispro (ADMELOG) corrective regimen sliding scale  lactated ringers.  magnesium sulfate  IVPB  melatonin  midodrine.  multivitamin  naloxegol  oxybutynin XL  oxyCODONE    IR  pantoprazole    Tablet  polyethylene glycol 3350  senna  sodium chloride 0.65% Nasal PRN  sucralfate  tiotropium 2.5 MICROgram(s)/olodaterol 2.5 MICROgram(s) (STIOLTO) Inhaler      Review of Systems--  A 10-point review of systems was obtained.     Pertinent positives and negatives--  Constitutional: No fevers. No Chills. No Rigors.   Cardiovascular: No chest pain. No palpitations.  Respiratory: No shortness of breath. No cough.  Gastrointestinal: No nausea or vomiting. No diarrhea or constipation.   Psychiatric: Pleasant. Appropriate affect.    Review of systems otherwise negative except as previously noted.    Physical Examination--  Vital Signs: T(F): 98.2 (09-06-24 @ 19:48), Max: 98.2 (09-06-24 @ 19:48)  HR: 133 (09-06-24 @ 19:00)  BP: 124/82 (09-06-24 @ 19:00)  RR: 16 (09-06-24 @ 19:00)  SpO2: 90% (09-06-24 @ 19:00)  Wt(kg): --  General: Nontoxic-appearing Male in no acute distress.  HEENT: AT/NC.   Neck: Not rigid. No sense of mass.  Nodes: None palpable.  Lungs: Clear bilaterally without rales, wheezing or rhonchi  Heart: Regular rate and rhythm. No Murmur. l.  Abdomen: Bowel sounds present and normoactive. Soft. Nondistended.  Back: No spinal tenderness. No costovertebral angle tenderness.   Extremities: No cyanosis or clubbing. No edema. rle wrapped  Skin: Warm. Dry. Good turgor. No rash. No vasculitic stigmata.  Psychiatric: Appropriate affect and mood for situation.         Laboratory Studies--  CBC                        7.3    10.96 )-----------( 470      ( 06 Sep 2024 12:14 )             23.9       Chemistries  09-06    143  |  112<H>  |  14  ----------------------------<  74  3.9   |  23  |  0.78    Ca    7.6<L>      06 Sep 2024 05:46  Phos  2.9     09-06  Mg     1.4     09-06    TPro  4.3<L>  /  Alb  1.2<L>  /  TBili  0.5  /  DBili  x   /  AST  21  /  ALT  19  /  AlkPhos  144<H>  09-06      Culture Data    Culture - Wound Aerobic/Anaerobic (collected 05 Sep 2024 12:55)  Source: Swab Swab  Preliminary Report (06 Sep 2024 18:29):    Moderate Escherichia coli    Few Citrobacter freundii    Rare Providencia stuartii    Few Corynebacterium striatum group "Susceptibilities not performed"    Culture - Blood (collected 05 Sep 2024 10:25)  Source: .Blood Blood-Peripheral  Preliminary Report (06 Sep 2024 15:01):    No growth at 24 hours    Culture - Blood (collected 05 Sep 2024 10:20)  Source: .Blood Blood-Peripheral  Preliminary Report (06 Sep 2024 15:01):    No growth at 24 hours    Culture - Tissue with Gram Stain (collected 05 Sep 2024 09:08)  Source: Tissue Other, Wound  Gram Stain (05 Sep 2024 22:38):    Rare polymorphonuclear leukocytes per low power field    Numerous Gram Negative Rods per oil power field    Moderate Gram Positive Cocci per oil power field  Preliminary Report (06 Sep 2024 18:41):    Numerous Proteus mirabilis    Rare Citrobacter freundii    Moderate Enterococcus avium

## 2024-09-06 NOTE — CARE COORDINATION ASSESSMENT. - PRO ARRIVE FROM
Pt known to [facility] for long term care, the patient has been at  since 2019./inpatient rehabilitation facility

## 2024-09-06 NOTE — CARE COORDINATION ASSESSMENT. - REASON FOR CONSULT
Pt awake, alert, he authorized SW to speak with his sister/Tierra. SW reached out to pt's sister/Tierra Miles at ([phone]) in order to conduct assessment and to discuss SW roles with good understanding./coordination of care/discharge planning

## 2024-09-06 NOTE — PROGRESS NOTE ADULT - ASSESSMENT
54 year old male with a PMH of afib on Eliquis, indwelling jacques, CAD s/p stents, CVA, DM2, HTN, R heel OM s/p debridement, PE who presented to the ED from wound care with hypotension    Problem list:  Hypotension  Afib RVR  Sepsis   R heel ulcer     Neuro:  -Oxy 10 mg BId  -Acetaminophen 650mg q6h prn Fever  -Cont neurontin    CV:  -Afib due to sepsis  -Hypertension  -Amio drip finishing  -Dig load now, consider starting lopressor if hr not responding  -Midodrine 15mg TID started    Pulm:  -Cont Cetirizine  -Cont Tiotropium    GI:  -Senna at bedtime  Diet consistent carb  -Fecal occult results pending  -Cont Mobantic    Renal:  -Replete electrolytes as needed  -Mg 4g now  -Potassium 20milli now  -Cont LR for now  -Iron studies, f/u results    ID:  -Osteomyelitis right foot  -Cont aztreonam    Heme:  -Lovenox 100 BID DVT ppx  - HGB 7.3    Endo:  -Sliding scale  -Monitor blood glucose levels  -D5 to start if glucose <100

## 2024-09-06 NOTE — CONSULT NOTE ADULT - SUBJECTIVE AND OBJECTIVE BOX
Vascular Attending:  Dr Hardin      HPI:  53yo male bib ems from wound care with hypotension and poss gangrenous foot, being treated after having amputations, pt denies fever, chills, has no pain  In ER patient was found to be in hypotension and rapid AF.  patient is being admitted for further work up and treatment.   (05 Sep 2024 17:06)      PAST MEDICAL & SURGICAL HISTORY:  Diabetes mellitus with no complication  Afib  Hypertension  BPH (benign prostatic hyperplasia)  Perforated gastric ulcer  s/p emergent ex-lap omentopexy and plication 6/2019  Pulmonary embolism  History of non-ST elevation myocardial infarction (NSTEMI)  Osteomyelitis  s/p debridement  CAD S/P percutaneous coronary angioplasty  Cerebrovascular accident  H/O abdominal surgery  Perforated gastric ulcer  Traumatic amputation of left foot, initial encounter          REVIEW OF SYSTEMS  General: weakness and fatigue  Skin/Breast:	denies  Ophthalmologic: denies  ENMT:	denies  Respiratory and Thorax: denies	  Cardiovascular:	denies CP or palps  Gastrointestinal:	denies  Genitourinary:	denies  Musculoskeletal:	 non ambulatory recently sec to non weight bearing RLE  Neurological:	denies  Psychiatric: denies  Hematology/Lymphatics:	 intermittent BLE edema  Endocrine: DM  Allergic/Immunologic:	    MEDICATIONS  (STANDING):  aMIOdarone Infusion 1 mG/Min (33.3 mL/Hr) IV Continuous <Continuous>  aMIOdarone Infusion 0.5 mG/Min (16.7 mL/Hr) IV Continuous <Continuous>  ascorbic acid 500 milliGRAM(s) Oral daily  atorvastatin 40 milliGRAM(s) Oral at bedtime  aztreonam  IVPB 1000 milliGRAM(s) IV Intermittent every 6 hours  cetirizine 10 milliGRAM(s) Oral daily  chlorhexidine 2% Cloths 1 Application(s) Topical <User Schedule>  cholecalciferol 1000 Unit(s) Oral daily  dextrose 5%. 1000 milliLiter(s) (100 mL/Hr) IV Continuous <Continuous>  dextrose 5%. 1000 milliLiter(s) (50 mL/Hr) IV Continuous <Continuous>  dextrose 50% Injectable 12.5 Gram(s) IV Push once  dextrose 50% Injectable 25 Gram(s) IV Push once  dextrose 50% Injectable 25 Gram(s) IV Push once  dextrose Oral Gel 15 Gram(s) Oral once  famotidine    Tablet 20 milliGRAM(s) Oral daily  ferrous    sulfate 325 milliGRAM(s) Oral two times a day  gabapentin 100 milliGRAM(s) Oral every 12 hours  glucagon  Injectable 1 milliGRAM(s) IntraMuscular once  insulin lispro (ADMELOG) corrective regimen sliding scale   SubCutaneous at bedtime  insulin lispro (ADMELOG) corrective regimen sliding scale   SubCutaneous three times a day before meals  lactated ringers. 1000 milliLiter(s) (100 mL/Hr) IV Continuous <Continuous>  melatonin 5 milliGRAM(s) Oral at bedtime  midodrine. 5 milliGRAM(s) Oral three times a day  multivitamin 1 Tablet(s) Oral daily  naloxegol 25 milliGRAM(s) Oral daily  oxybutynin XL 10 milliGRAM(s) Oral at bedtime  oxyCODONE    IR 10 milliGRAM(s) Oral two times a day  pantoprazole    Tablet 40 milliGRAM(s) Oral before breakfast  polyethylene glycol 3350 17 Gram(s) Oral daily  senna 2 Tablet(s) Oral at bedtime  sucralfate 1 Gram(s) Oral two times a day  tiotropium 2.5 MICROgram(s)/olodaterol 2.5 MICROgram(s) (STIOLTO) Inhaler 2 Puff(s) Inhalation daily    MEDICATIONS  (PRN):  acetaminophen     Tablet .. 650 milliGRAM(s) Oral every 6 hours PRN Temp greater or equal to 38C (100.4F)  sodium chloride 0.65% Nasal 1 Spray(s) Both Nostrils daily PRN Nasal Congestion      Allergies  fish (Hives)  ertapenem (Blisters; Rash)    SOCIAL HISTORY; walked with RW until recently when was advised to be non weight bearing      Vital Signs Last 24 Hrs  T(C): 36.4 (06 Sep 2024 07:54), Max: 37.2 (05 Sep 2024 16:19)  T(F): 97.6 (06 Sep 2024 07:54), Max: 98.9 (05 Sep 2024 16:19)  HR: 135 (06 Sep 2024 09:00) (92 - 156)  BP: 97/57 (06 Sep 2024 09:00) (80/64 - 102/60)  BP(mean): 72 (06 Sep 2024 09:00) (62 - 76)  RR: 12 (06 Sep 2024 09:00) (12 - 22)  SpO2: 100% (06 Sep 2024 09:00) (95% - 100%)    Parameters below as of 05 Sep 2024 23:52  Patient On (Oxygen Delivery Method): room air        PHYSICAL EXAM:      Constitutional:    Eyes:    ENMT:    Neck:    Breasts:    Back:    Respiratory:    Cardiovascular:    Gastrointestinal:    Genitourinary:    Rectal:    Extremities:    Vascular:    Neurological:    Skin:    Lymph Nodes:    Musculoskeletal:    Psychiatric:      Pulses:   Right:                                                                          Left:  FEM [ ]2+ [ ]1+ [ ]doppler                                             FEM [ ]2+ [ ]1+ [ ]doppler    POP [ ]2+ [ ]1+ [ ]doppler                                             POP [ ]2+ [ ]1+ [ ]doppler    DP [ ]2+ [ ]1+ [ ]doppler                                                DP [ ]2+ [ ]1+ [ ]doppler  PT[ ]2+ [ ]1+ [ ]doppler                                                  PT [ ]2+ [ ]1+ [ ]doppler      LABS:                        7.0    10.46 )-----------( 461      ( 06 Sep 2024 05:46 )             24.1     09-06    143  |  112<H>  |  14  ----------------------------<  74  3.9   |  23  |  0.78    Ca    7.6<L>      06 Sep 2024 05:46  Phos  2.9     09-06  Mg     1.4     09-06    TPro  4.3<L>  /  Alb  1.2<L>  /  TBili  0.5  /  DBili  x   /  AST  21  /  ALT  19  /  AlkPhos  144<H>  09-06    PT/INR - ( 06 Sep 2024 05:46 )   PT: 17.6 sec;   INR: 1.56 ratio         PTT - ( 06 Sep 2024 05:46 )  PTT:36.5 sec  Urinalysis Basic - ( 06 Sep 2024 05:46 )    Color: x / Appearance: x / SG: x / pH: x  Gluc: 74 mg/dL / Ketone: x  / Bili: x / Urobili: x   Blood: x / Protein: x / Nitrite: x   Leuk Esterase: x / RBC: x / WBC x   Sq Epi: x / Non Sq Epi: x / Bacteria: x        RADIOLOGY & ADDITIONAL STUDIES    Impression and Plan: Vascular Attending:  Dr Hardin      HPI:  53yo male with h/o AF, indwelling Aguilera, CAD s/p stents, CVA, DMT 2, Hypertension, osteomyelitis s/p debridement, bib ems from wound care with hypotension and poss gangrenous foot. Pt denies fever, chills, has no pain  In ER patient was found to be in hypotension and rapid AF. Last admission in June 2024 pt with pulmonary failure and prolonged hospital course. He is S/P right heel debridement 7/5/24 by podiatry  patient is being admitted for further work up and treatment.   (05 Sep 2024 17:06)    Pt was rapid response for hypotension and Afib with RVR and transferred to ICU for further management  PAST MEDICAL & SURGICAL HISTORY:  Diabetes mellitus with no complication  Afib  Hypertension  BPH (benign prostatic hyperplasia)  Perforated gastric ulcer  s/p emergent ex-lap omentopexy and plication 6/2019  Pulmonary embolism  History of non-ST elevation myocardial infarction (NSTEMI)  Osteomyelitis  s/p debridement  CAD S/P percutaneous coronary angioplasty  Cerebrovascular accident  H/O abdominal surgery  Perforated gastric ulcer  Traumatic amputation of left foot, initial encounter          REVIEW OF SYSTEMS  General: weakness and fatigue  Skin/Breast:	denies  Ophthalmologic: denies  ENMT:	denies  Respiratory and Thorax: denies	  Cardiovascular:	denies CP or palps  Gastrointestinal:	denies  Genitourinary:	denies  Musculoskeletal:	 non ambulatory recently sec to non weight bearing RLE  Neurological:	denies  Psychiatric: denies  Hematology/Lymphatics:	 intermittent BLE edema  Endocrine: DM  Allergic/Immunologic:	    MEDICATIONS  (STANDING):  aMIOdarone Infusion 1 mG/Min (33.3 mL/Hr) IV Continuous <Continuous>  aMIOdarone Infusion 0.5 mG/Min (16.7 mL/Hr) IV Continuous <Continuous>  ascorbic acid 500 milliGRAM(s) Oral daily  atorvastatin 40 milliGRAM(s) Oral at bedtime  aztreonam  IVPB 1000 milliGRAM(s) IV Intermittent every 6 hours  cetirizine 10 milliGRAM(s) Oral daily  chlorhexidine 2% Cloths 1 Application(s) Topical <User Schedule>  cholecalciferol 1000 Unit(s) Oral daily  dextrose 5%. 1000 milliLiter(s) (100 mL/Hr) IV Continuous <Continuous>  dextrose 5%. 1000 milliLiter(s) (50 mL/Hr) IV Continuous <Continuous>  dextrose 50% Injectable 12.5 Gram(s) IV Push once  dextrose 50% Injectable 25 Gram(s) IV Push once  dextrose 50% Injectable 25 Gram(s) IV Push once  dextrose Oral Gel 15 Gram(s) Oral once  famotidine    Tablet 20 milliGRAM(s) Oral daily  ferrous    sulfate 325 milliGRAM(s) Oral two times a day  gabapentin 100 milliGRAM(s) Oral every 12 hours  glucagon  Injectable 1 milliGRAM(s) IntraMuscular once  insulin lispro (ADMELOG) corrective regimen sliding scale   SubCutaneous at bedtime  insulin lispro (ADMELOG) corrective regimen sliding scale   SubCutaneous three times a day before meals  lactated ringers. 1000 milliLiter(s) (100 mL/Hr) IV Continuous <Continuous>  melatonin 5 milliGRAM(s) Oral at bedtime  midodrine. 5 milliGRAM(s) Oral three times a day  multivitamin 1 Tablet(s) Oral daily  naloxegol 25 milliGRAM(s) Oral daily  oxybutynin XL 10 milliGRAM(s) Oral at bedtime  oxyCODONE    IR 10 milliGRAM(s) Oral two times a day  pantoprazole    Tablet 40 milliGRAM(s) Oral before breakfast  polyethylene glycol 3350 17 Gram(s) Oral daily  senna 2 Tablet(s) Oral at bedtime  sucralfate 1 Gram(s) Oral two times a day  tiotropium 2.5 MICROgram(s)/olodaterol 2.5 MICROgram(s) (STIOLTO) Inhaler 2 Puff(s) Inhalation daily    MEDICATIONS  (PRN):  acetaminophen     Tablet .. 650 milliGRAM(s) Oral every 6 hours PRN Temp greater or equal to 38C (100.4F)  sodium chloride 0.65% Nasal 1 Spray(s) Both Nostrils daily PRN Nasal Congestion      Allergies  fish (Hives)  ertapenem (Blisters; Rash)    SOCIAL HISTORY; walked with RW until recently when was advised to be non weight bearing      Vital Signs Last 24 Hrs  T(C): 36.4 (06 Sep 2024 07:54), Max: 37.2 (05 Sep 2024 16:19)  T(F): 97.6 (06 Sep 2024 07:54), Max: 98.9 (05 Sep 2024 16:19)  HR: 135 (06 Sep 2024 09:00) (92 - 156)  BP: 97/57 (06 Sep 2024 09:00) (80/64 - 102/60)  BP(mean): 72 (06 Sep 2024 09:00) (62 - 76)  RR: 12 (06 Sep 2024 09:00) (12 - 22)  SpO2: 100% (06 Sep 2024 09:00) (95% - 100%)    Parameters below as of 05 Sep 2024 23:52  Patient On (Oxygen Delivery Method): room air        PHYSICAL EXAM:  Constitutional: Ill appearing M in NAD  Eyes: EOMI  ENMT: WNL  Neck: No JVD  Respiratory: diminished at bases  Cardiovascular: tachy irreg S1, S2  Gastrointestinal: soft, ND, NT  Extremities: R foot wrapped + malodor 1+ edema with chronic stasis changes  Neurological: A&O x 3  Skin: Pale  Pulses:   Right:                                                                          Left:  FEM [x ]2+ [ ]1+ [ ]doppler                                             FEM [ x]2+ [ ]1+ [ ]doppler    POP [ ]2+ [ ]1+ [ ]doppler                                             POP [ ]2+ [ ]1+ [ ]doppler    DP [ ]2+ [ ]1+ [x ]doppler                                                DP [ ]2+ [ ]1+ [ x]doppler  PT[ ]2+ [ ]1+ [ ]doppler                                                  PT [ ]2+ [ ]1+ [ ]doppler      LABS:                        7.0    10.46 )-----------( 461      ( 06 Sep 2024 05:46 )             24.1     09-06    143  |  112<H>  |  14  ----------------------------<  74  3.9   |  23  |  0.78    Ca    7.6<L>      06 Sep 2024 05:46  Phos  2.9     09-06  Mg     1.4     09-06    TPro  4.3<L>  /  Alb  1.2<L>  /  TBili  0.5  /  DBili  x   /  AST  21  /  ALT  19  /  AlkPhos  144<H>  09-06    PT/INR - ( 06 Sep 2024 05:46 )   PT: 17.6 sec;   INR: 1.56 ratio    PTT - ( 06 Sep 2024 05:46 )  PTT:36.5 sec    Urinalysis Basic - ( 06 Sep 2024 05:46 )    Color: x / Appearance: x / SG: x / pH: x  Gluc: 74 mg/dL / Ketone: x  / Bili: x / Urobili: x   Blood: x / Protein: x / Nitrite: x   Leuk Esterase: x / RBC: x / WBC x   Sq Epi: x / Non Sq Epi: x / Bacteria: x        RADIOLOGY & ADDITIONAL STUDIES

## 2024-09-06 NOTE — PROGRESS NOTE ADULT - SUBJECTIVE AND OBJECTIVE BOX
[facility] -- Bolivar Carbajal,  Brittany, Reynaldo Sweeney, Gera France Cohen  Office # [phone]    Follow Up:  AF    Subjective/Observations:  No complaints of chest pain, dyspnea, or palpitations reported. No signs of orthopnea or PND.       REVIEW OF SYSTEMS: All other review of systems is negative unless indicated above  PAST MEDICAL & SURGICAL HISTORY:  Diabetes      Diabetes mellitus with no complication      Afib      Hypertension      BPH (benign prostatic hyperplasia)      Perforated gastric ulcer  s/p emergent ex-lap omentopexy and plication 6/2019      Pulmonary embolism      History of non-ST elevation myocardial infarction (NSTEMI)      Osteomyelitis  s/p debridement      CAD S/P percutaneous coronary angioplasty      Cerebrovascular accident      H/O abdominal surgery      Perforated gastric ulcer      Traumatic amputation of left foot, initial encounter        MEDICATIONS  (STANDING):  aMIOdarone Infusion 0.5 mG/Min (16.7 mL/Hr) IV Continuous <Continuous>  aMIOdarone Infusion 1 mG/Min (33.3 mL/Hr) IV Continuous <Continuous>  ascorbic acid 500 milliGRAM(s) Oral daily  atorvastatin 40 milliGRAM(s) Oral at bedtime  aztreonam  IVPB 1000 milliGRAM(s) IV Intermittent every 6 hours  cetirizine 10 milliGRAM(s) Oral daily  chlorhexidine 2% Cloths 1 Application(s) Topical <User Schedule>  cholecalciferol 1000 Unit(s) Oral daily  dextrose 5%. 1000 milliLiter(s) (100 mL/Hr) IV Continuous <Continuous>  dextrose 5%. 1000 milliLiter(s) (50 mL/Hr) IV Continuous <Continuous>  dextrose 50% Injectable 12.5 Gram(s) IV Push once  dextrose 50% Injectable 25 Gram(s) IV Push once  dextrose 50% Injectable 25 Gram(s) IV Push once  dextrose Oral Gel 15 Gram(s) Oral once  famotidine    Tablet 20 milliGRAM(s) Oral daily  ferrous    sulfate 325 milliGRAM(s) Oral two times a day  gabapentin 100 milliGRAM(s) Oral every 12 hours  glucagon  Injectable 1 milliGRAM(s) IntraMuscular once  insulin lispro (ADMELOG) corrective regimen sliding scale   SubCutaneous at bedtime  insulin lispro (ADMELOG) corrective regimen sliding scale   SubCutaneous three times a day before meals  lactated ringers. 1000 milliLiter(s) (100 mL/Hr) IV Continuous <Continuous>  melatonin 5 milliGRAM(s) Oral at bedtime  midodrine. 5 milliGRAM(s) Oral three times a day  multivitamin 1 Tablet(s) Oral daily  naloxegol 25 milliGRAM(s) Oral daily  oxybutynin XL 10 milliGRAM(s) Oral at bedtime  oxyCODONE    IR 10 milliGRAM(s) Oral two times a day  pantoprazole    Tablet 40 milliGRAM(s) Oral before breakfast  polyethylene glycol 3350 17 Gram(s) Oral daily  senna 2 Tablet(s) Oral at bedtime  sucralfate 1 Gram(s) Oral two times a day  tiotropium 2.5 MICROgram(s)/olodaterol 2.5 MICROgram(s) (STIOLTO) Inhaler 2 Puff(s) Inhalation daily    MEDICATIONS  (PRN):  acetaminophen     Tablet .. 650 milliGRAM(s) Oral every 6 hours PRN Temp greater or equal to 38C (100.4F)  sodium chloride 0.65% Nasal 1 Spray(s) Both Nostrils daily PRN Nasal Congestion    Allergies    fish (Hives)  ertapenem (Blisters; Rash)    Intolerances      Vital Signs Last 24 Hrs  T(C): 36.5 (06 Sep 2024 11:50), Max: 37.2 (05 Sep 2024 16:19)  T(F): 97.7 (06 Sep 2024 11:50), Max: 98.9 (05 Sep 2024 16:19)  HR: 143 (06 Sep 2024 12:15) (92 - 156)  BP: 109/81 (06 Sep 2024 12:00) (80/64 - 109/81)  BP(mean): 89 (06 Sep 2024 12:00) (62 - 89)  RR: 12 (06 Sep 2024 12:15) (12 - 22)  SpO2: 98% (06 Sep 2024 12:15) (95% - 100%)    Parameters below as of 05 Sep 2024 23:52  Patient On (Oxygen Delivery Method): room air      I&O's Summary    05 Sep 2024 07:01  -  06 Sep 2024 07:00  --------------------------------------------------------  IN: 833.6 mL / OUT: 665 mL / NET: 168.6 mL    06 Sep 2024 07:01  -  06 Sep 2024 15:07  --------------------------------------------------------  IN: 583.5 mL / OUT: 230 mL / NET: 353.5 mL      Weight (kg): 96 (09-06 @ 05:45)    TELE: AF RVR  PHYSICAL EXAM:  Constitutional: mild distress secondary to foot pain,   HEENT NC/AT, moist mucous membranes  Pulmonary: Non-labored, breath sounds are clear bilaterally, no wheezing, rales or rhonchi  Cardiovascular: irregular rhythm, tachycardic, reproducible tenderness to palpation of anterior chest wall   Gastrointestinal: Soft, nontender, nondistended, normoactive bowel sounds  Extremities: left foot amputated. right foot wrapped in bandage.   Neurological: Alert, awake, answers questions appropriately, follows commands  Psych: Mood & affect appropriate  LABS: All Labs Reviewed:                        7.3    10.96 )-----------( 470      ( 06 Sep 2024 12:14 )             23.9                         7.0    10.46 )-----------( 461      ( 06 Sep 2024 05:46 )             24.1                         8.3    19.41 )-----------( 513      ( 05 Sep 2024 10:35 )             27.3     06 Sep 2024 05:46    143    |  112    |  14     ----------------------------<  74     3.9     |  23     |  0.78   05 Sep 2024 10:25    139    |  107    |  22     ----------------------------<  73     4.3     |  23     |  1.20     Ca    7.6        06 Sep 2024 05:46  Ca    8.0        05 Sep 2024 10:25  Phos  2.9       06 Sep 2024 05:46  Mg     1.4       06 Sep 2024 05:46    TPro  4.3    /  Alb  1.2    /  TBili  0.5    /  DBili  x      /  AST  21     /  ALT  19     /  AlkPhos  144    06 Sep 2024 05:46  TPro  5.6    /  Alb  1.6    /  TBili  0.6    /  DBili  x      /  AST  23     /  ALT  26     /  AlkPhos  182    05 Sep 2024 10:25    PT/INR - ( 06 Sep 2024 05:46 )   PT: 17.6 sec;   INR: 1.56 ratio         PTT - ( 06 Sep 2024 05:46 )  PTT:36.5 sec           EXAM:  ECHO TTE WO CON COMP W DOPPLR         PROCEDURE DATE:  12/24/2019        INTERPRETATION:  INDICATION: Coronary artery disease    Blood Pressure 117/71    Height 187     Weight 93       BSA 2.2    Dimensions:    LA 4.2       Normal Values: 2.0 - 4.0 cm    Ao 4.0        Normal Values: 2.0 - 3.8 cm  SEPTUM 1.6       Normal Values: 0.6 - 1.2 cm  PWT 1.6       Normal Values: 0.6 - 1.1 cm  LVIDd 5.8         Normal Values: 3.0 - 5.6 cm  LVIDs 3.2         Normal Values: 1.8 - 4.0 cm    Derived Variables:  LVMI g/m2  RWT  Fractional Short  Ejection Fraction    Doppler Peak v. AoV= (m/sec)    OBSERVATIONS:    Mitral Valve: normal, mild MR.  Aortic Valve/Aorta: normal trileaflet aortic valve.  Tricuspid Valve: normal with trace TR.  Pulmonic Valve: normal  Left Atrium: Enlarged  Right Atrium: normal  Left Ventricle: Severe concentric LVH with normal systolic function, estimated LVEF of 65%.  Right Ventricle: normal size and systolic function.  Pericardium/Pleura: no significant pleural effusion, no significant pericardial effusion.  Pulmonary/RV Pressure: Inadequate TR jet to estimate PA systolic pressure  LV Diastolic Function: Grade 2diastolic dysfunction.    Severe concentric LVH, and mild LV enlargement, with normal systolic function, estimated LVEF of 65%. Normal right ventricular size and systolic function. Grade 2diastolic dysfunction. Left atrial enlargement The aortic root is normal in size. The mitral valve is structurally normal, mild MR. The aortic valve is trileaflet without stenosis or insufficiency. Trace physiologic TR. Inadequate TR jet to estimate PA systolic pressure No significant pericardial effusion.                  JOVANNI ALVAREZ M.D., ATTENDING CARDIOLOGIST  This document has been electronically signed. Dec 25 2019 11:45AM

## 2024-09-06 NOTE — PROGRESS NOTE ADULT - ASSESSMENT
55 YO M with past medical history of AFib (on Eliquis), s/p cardiac arrest x 2 w/ AHRF (on recent admission), indwelling Aguilera, cerebral aneurysm, CAD (s/p stents), CVA, T2DM, HTN, OM (s/p debridement), PE, perforated gastric ulcer s/p ometnopexy 2019 with Dr. Mejia who presents with possible gangrenous right foot. In ED pt found to be in afib with RVR and hypotension.    - pt with septic shock from likely gangrenous foot.   - appears sig volume depleted  - agree with IVF  - RRT 9/5 @1620 for rapid AF and hypotension  - s/p Cardizem 15 mg IV x 1, briefly on cardizem gtt (stopped by ICU), s/p Digoxin 500 mcg IVP x 1  - HR remains uncontrolled, should complete dig load with 0.25 q6hrs x 2 doses to complete 1 mg dose  - amiodarone gtt started for rate control. given pts hypotension can't use AV yung blockers, will use amio  - if bp recovers would resume ccb or bb  -c/w midodrine 5mg TID for bp support.      - pt on eliquis at home, unsure if pt took home dose today.  - c/w heparin gtt in anticipation for procedure     - EKG nonischemic  - Will need for optimization of HR prior to OR. Sepsis likely driving AF w rvr.   - Monitor and replete lytes, keep K>4, Mg>2.  - Will continue to follow.    Nery Alex NP  Nurse Practitioner- Cardiology   Call TEAMS

## 2024-09-07 LAB
-  AMOXICILLIN/CLAVULANIC ACID: SIGNIFICANT CHANGE UP
-  AMPICILLIN/SULBACTAM: SIGNIFICANT CHANGE UP
-  AMPICILLIN: SIGNIFICANT CHANGE UP
-  AZTREONAM: SIGNIFICANT CHANGE UP
-  CEFAZOLIN: SIGNIFICANT CHANGE UP
-  CEFEPIME: SIGNIFICANT CHANGE UP
-  CEFOXITIN: SIGNIFICANT CHANGE UP
-  CEFTRIAXONE: SIGNIFICANT CHANGE UP
-  CIPROFLOXACIN: SIGNIFICANT CHANGE UP
-  ERTAPENEM: SIGNIFICANT CHANGE UP
-  GENTAMICIN: SIGNIFICANT CHANGE UP
-  IMIPENEM: SIGNIFICANT CHANGE UP
-  IMIPENEM: SIGNIFICANT CHANGE UP
-  LEVOFLOXACIN: SIGNIFICANT CHANGE UP
-  MEROPENEM: SIGNIFICANT CHANGE UP
-  PIPERACILLIN/TAZOBACTAM: SIGNIFICANT CHANGE UP
-  TOBRAMYCIN: SIGNIFICANT CHANGE UP
-  TRIMETHOPRIM/SULFAMETHOXAZOLE: SIGNIFICANT CHANGE UP
-  VANCOMYCIN: SIGNIFICANT CHANGE UP
ALBUMIN SERPL ELPH-MCNC: 1.2 G/DL — LOW (ref 3.3–5)
ALP SERPL-CCNC: 181 U/L — HIGH (ref 40–120)
ALT FLD-CCNC: 22 U/L — SIGNIFICANT CHANGE UP (ref 12–78)
ANION GAP SERPL CALC-SCNC: 8 MMOL/L — SIGNIFICANT CHANGE UP (ref 5–17)
AST SERPL-CCNC: 25 U/L — SIGNIFICANT CHANGE UP (ref 15–37)
BASOPHILS # BLD AUTO: 0.06 K/UL — SIGNIFICANT CHANGE UP (ref 0–0.2)
BASOPHILS NFR BLD AUTO: 0.5 % — SIGNIFICANT CHANGE UP (ref 0–2)
BILIRUB SERPL-MCNC: 0.5 MG/DL — SIGNIFICANT CHANGE UP (ref 0.2–1.2)
BUN SERPL-MCNC: 10 MG/DL — SIGNIFICANT CHANGE UP (ref 7–23)
CALCIUM SERPL-MCNC: 7.7 MG/DL — LOW (ref 8.5–10.1)
CHLORIDE SERPL-SCNC: 109 MMOL/L — HIGH (ref 96–108)
CO2 SERPL-SCNC: 22 MMOL/L — SIGNIFICANT CHANGE UP (ref 22–31)
CREAT SERPL-MCNC: 0.63 MG/DL — SIGNIFICANT CHANGE UP (ref 0.5–1.3)
EGFR: 112 ML/MIN/1.73M2 — SIGNIFICANT CHANGE UP
EOSINOPHIL # BLD AUTO: 0.22 K/UL — SIGNIFICANT CHANGE UP (ref 0–0.5)
EOSINOPHIL NFR BLD AUTO: 1.7 % — SIGNIFICANT CHANGE UP (ref 0–6)
FERRITIN SERPL-MCNC: 493 NG/ML — HIGH (ref 30–400)
FOLATE SERPL-MCNC: 12.5 NG/ML — SIGNIFICANT CHANGE UP
GLUCOSE BLDC GLUCOMTR-MCNC: 79 MG/DL — SIGNIFICANT CHANGE UP (ref 70–99)
GLUCOSE BLDC GLUCOMTR-MCNC: 81 MG/DL — SIGNIFICANT CHANGE UP (ref 70–99)
GLUCOSE BLDC GLUCOMTR-MCNC: 90 MG/DL — SIGNIFICANT CHANGE UP (ref 70–99)
GLUCOSE BLDC GLUCOMTR-MCNC: 96 MG/DL — SIGNIFICANT CHANGE UP (ref 70–99)
GLUCOSE SERPL-MCNC: 93 MG/DL — SIGNIFICANT CHANGE UP (ref 70–99)
HCT VFR BLD CALC: 27.1 % — LOW (ref 39–50)
HGB BLD-MCNC: 8.5 G/DL — LOW (ref 13–17)
IMM GRANULOCYTES NFR BLD AUTO: 1.2 % — HIGH (ref 0–0.9)
IRON SATN MFR SERPL: 34 UG/DL — LOW (ref 45–165)
IRON SATN MFR SERPL: 36 % — SIGNIFICANT CHANGE UP (ref 16–55)
LYMPHOCYTES # BLD AUTO: 1.21 K/UL — SIGNIFICANT CHANGE UP (ref 1–3.3)
LYMPHOCYTES # BLD AUTO: 9.3 % — LOW (ref 13–44)
MAGNESIUM SERPL-MCNC: 1.9 MG/DL — SIGNIFICANT CHANGE UP (ref 1.6–2.6)
MCHC RBC-ENTMCNC: 29.3 PG — SIGNIFICANT CHANGE UP (ref 27–34)
MCHC RBC-ENTMCNC: 31.4 GM/DL — LOW (ref 32–36)
MCV RBC AUTO: 93.4 FL — SIGNIFICANT CHANGE UP (ref 80–100)
METHOD TYPE: SIGNIFICANT CHANGE UP
MONOCYTES # BLD AUTO: 1.01 K/UL — HIGH (ref 0–0.9)
MONOCYTES NFR BLD AUTO: 7.8 % — SIGNIFICANT CHANGE UP (ref 2–14)
NEUTROPHILS # BLD AUTO: 10.35 K/UL — HIGH (ref 1.8–7.4)
NEUTROPHILS NFR BLD AUTO: 79.5 % — HIGH (ref 43–77)
NRBC # BLD: 0 /100 WBCS — SIGNIFICANT CHANGE UP (ref 0–0)
OB PNL STL: NEGATIVE — SIGNIFICANT CHANGE UP
PHOSPHATE SERPL-MCNC: 2.5 MG/DL — SIGNIFICANT CHANGE UP (ref 2.5–4.5)
PLATELET # BLD AUTO: 556 K/UL — HIGH (ref 150–400)
POTASSIUM SERPL-MCNC: 3.9 MMOL/L — SIGNIFICANT CHANGE UP (ref 3.5–5.3)
POTASSIUM SERPL-SCNC: 3.9 MMOL/L — SIGNIFICANT CHANGE UP (ref 3.5–5.3)
PROT SERPL-MCNC: 4.9 G/DL — LOW (ref 6–8.3)
RBC # BLD: 2.9 M/UL — LOW (ref 4.2–5.8)
RBC # FLD: 16.1 % — HIGH (ref 10.3–14.5)
SODIUM SERPL-SCNC: 139 MMOL/L — SIGNIFICANT CHANGE UP (ref 135–145)
TIBC SERPL-MCNC: 95 UG/DL — LOW (ref 220–430)
TRANSFERRIN SERPL-MCNC: 50 MG/DL — LOW (ref 200–360)
UIBC SERPL-MCNC: 61 UG/DL — LOW (ref 110–370)
VIT B12 SERPL-MCNC: 720 PG/ML — SIGNIFICANT CHANGE UP (ref 232–1245)
WBC # BLD: 13 K/UL — HIGH (ref 3.8–10.5)
WBC # FLD AUTO: 13 K/UL — HIGH (ref 3.8–10.5)

## 2024-09-07 PROCEDURE — 99291 CRITICAL CARE FIRST HOUR: CPT

## 2024-09-07 PROCEDURE — 99233 SBSQ HOSP IP/OBS HIGH 50: CPT | Mod: GC

## 2024-09-07 PROCEDURE — 99232 SBSQ HOSP IP/OBS MODERATE 35: CPT | Mod: 57

## 2024-09-07 RX ORDER — OXYCODONE HYDROCHLORIDE 15 MG/1
200 TABLET ORAL DAILY
Refills: 0 | Status: CANCELLED | OUTPATIENT
Start: 2024-09-11 | End: 2024-09-10

## 2024-09-07 RX ORDER — METOPROLOL TARTRATE 100 MG/1
12.5 TABLET ORAL EVERY 6 HOURS
Refills: 0 | Status: DISCONTINUED | OUTPATIENT
Start: 2024-09-07 | End: 2024-09-07

## 2024-09-07 RX ORDER — METOPROLOL TARTRATE 100 MG/1
12.5 TABLET ORAL EVERY 6 HOURS
Refills: 0 | Status: DISCONTINUED | OUTPATIENT
Start: 2024-09-07 | End: 2024-09-08

## 2024-09-07 RX ORDER — HYDROMORPHONE HYDROCHLORIDE 2 MG/1
1 TABLET ORAL DAILY
Refills: 0 | Status: DISCONTINUED | OUTPATIENT
Start: 2024-09-07 | End: 2024-09-10

## 2024-09-07 RX ORDER — HYDROMORPHONE HYDROCHLORIDE 2 MG/1
1 TABLET ORAL ONCE
Refills: 0 | Status: DISCONTINUED | OUTPATIENT
Start: 2024-09-07 | End: 2024-09-07

## 2024-09-07 RX ORDER — METOPROLOL TARTRATE 100 MG/1
2.5 TABLET ORAL ONCE
Refills: 0 | Status: COMPLETED | OUTPATIENT
Start: 2024-09-07 | End: 2024-09-07

## 2024-09-07 RX ORDER — MUPIROCIN 2 %
1 OINTMENT (GRAM) TOPICAL
Refills: 0 | Status: DISCONTINUED | OUTPATIENT
Start: 2024-09-07 | End: 2024-09-10

## 2024-09-07 RX ADMIN — Medication 50 MILLIGRAM(S): at 11:29

## 2024-09-07 RX ADMIN — FAMOTIDINE 20 MILLIGRAM(S): 10 INJECTION INTRAVENOUS at 11:29

## 2024-09-07 RX ADMIN — Medication 50 MILLIGRAM(S): at 00:10

## 2024-09-07 RX ADMIN — OXYCODONE HYDROCHLORIDE 10 MILLIGRAM(S): 5 TABLET ORAL at 18:38

## 2024-09-07 RX ADMIN — Medication 325 MILLIGRAM(S): at 05:10

## 2024-09-07 RX ADMIN — Medication 500 MILLIGRAM(S): at 11:28

## 2024-09-07 RX ADMIN — METOPROLOL TARTRATE 12.5 MILLIGRAM(S): 100 TABLET ORAL at 13:57

## 2024-09-07 RX ADMIN — MIDODRINE HYDROCHLORIDE 15 MILLIGRAM(S): 5 TABLET ORAL at 05:10

## 2024-09-07 RX ADMIN — Medication 50 MILLIGRAM(S): at 05:08

## 2024-09-07 RX ADMIN — METOPROLOL TARTRATE 2.5 MILLIGRAM(S): 100 TABLET ORAL at 04:56

## 2024-09-07 RX ADMIN — SUCRALFATE 1 GRAM(S): 1 SUSPENSION ORAL at 05:10

## 2024-09-07 RX ADMIN — CHLORHEXIDINE GLUCONATE 1 APPLICATION(S): 40 SOLUTION TOPICAL at 05:11

## 2024-09-07 RX ADMIN — Medication 325 MILLIGRAM(S): at 18:38

## 2024-09-07 RX ADMIN — HYDROMORPHONE HYDROCHLORIDE 1 MILLIGRAM(S): 2 TABLET ORAL at 14:14

## 2024-09-07 RX ADMIN — OXYCODONE HYDROCHLORIDE 400 MILLIGRAM(S): 15 TABLET ORAL at 05:10

## 2024-09-07 RX ADMIN — Medication 100 MILLILITER(S): at 11:29

## 2024-09-07 RX ADMIN — Medication 5 MILLIGRAM(S): at 21:59

## 2024-09-07 RX ADMIN — Medication 1 APPLICATION(S): at 18:39

## 2024-09-07 RX ADMIN — TIOTROPIUM BROMIDE AND OLODATEROL 2 PUFF(S): 3.124; 2.736 SPRAY, METERED RESPIRATORY (INHALATION) at 11:27

## 2024-09-07 RX ADMIN — Medication 1 TABLET(S): at 11:28

## 2024-09-07 RX ADMIN — NALOXEGOL OXALATE 25 MILLIGRAM(S): 25 TABLET, FILM COATED ORAL at 11:28

## 2024-09-07 RX ADMIN — MIDODRINE HYDROCHLORIDE 15 MILLIGRAM(S): 5 TABLET ORAL at 11:27

## 2024-09-07 RX ADMIN — METOPROLOL TARTRATE 12.5 MILLIGRAM(S): 100 TABLET ORAL at 18:39

## 2024-09-07 RX ADMIN — OXYCODONE HYDROCHLORIDE 10 MILLIGRAM(S): 5 TABLET ORAL at 05:09

## 2024-09-07 RX ADMIN — Medication 10 MILLIGRAM(S): at 11:29

## 2024-09-07 RX ADMIN — OXYCODONE HYDROCHLORIDE 10 MILLIGRAM(S): 5 TABLET ORAL at 05:30

## 2024-09-07 RX ADMIN — Medication 1000 UNIT(S): at 11:28

## 2024-09-07 RX ADMIN — Medication 50 MILLIGRAM(S): at 18:39

## 2024-09-07 RX ADMIN — Medication 40 MILLIGRAM(S): at 21:58

## 2024-09-07 RX ADMIN — OXYBUTYNIN CHLORIDE 10 MILLIGRAM(S): 5 TABLET ORAL at 01:14

## 2024-09-07 RX ADMIN — OXYBUTYNIN CHLORIDE 10 MILLIGRAM(S): 5 TABLET ORAL at 21:59

## 2024-09-07 RX ADMIN — ENOXAPARIN SODIUM 100 MILLIGRAM(S): 100 INJECTION SUBCUTANEOUS at 18:39

## 2024-09-07 RX ADMIN — Medication 40 MILLIGRAM(S): at 07:20

## 2024-09-07 RX ADMIN — Medication 100 MILLIGRAM(S): at 18:38

## 2024-09-07 RX ADMIN — POLYETHYLENE GLYCOL 3350 17 GRAM(S): 17 POWDER, FOR SOLUTION ORAL at 11:28

## 2024-09-07 RX ADMIN — Medication 100 MILLIGRAM(S): at 05:09

## 2024-09-07 RX ADMIN — OXYCODONE HYDROCHLORIDE 400 MILLIGRAM(S): 15 TABLET ORAL at 12:27

## 2024-09-07 RX ADMIN — SUCRALFATE 1 GRAM(S): 1 SUSPENSION ORAL at 18:38

## 2024-09-07 RX ADMIN — ENOXAPARIN SODIUM 100 MILLIGRAM(S): 100 INJECTION SUBCUTANEOUS at 04:47

## 2024-09-07 RX ADMIN — OXYCODONE HYDROCHLORIDE 400 MILLIGRAM(S): 15 TABLET ORAL at 21:58

## 2024-09-07 RX ADMIN — HYDROMORPHONE HYDROCHLORIDE 1 MILLIGRAM(S): 2 TABLET ORAL at 13:53

## 2024-09-07 RX ADMIN — MIDODRINE HYDROCHLORIDE 15 MILLIGRAM(S): 5 TABLET ORAL at 18:38

## 2024-09-07 NOTE — ASSESSMENT
[Written] : Written [Verbal] : Verbal [Patient] : Patient [Caregiver] : Caregiver [Good - alert, interested, motivated] : Good - alert, interested, motivated [Verbalizes knowledge/Understanding] : Verbalizes knowledge/understanding [Dressing changes] : dressing changes [Skin Care] : skin care [Pressure relief] : pressure relief [Signs and symptoms of infection] : sign and symptoms of infection [Surgery] : surgery [Nutrition] : nutrition [How and When to Call] : how and when to call [Labs and Tests] : labs and tests [Pain Management] : pain management [Off-loading] : off-loading [Hospitalization] : hospitalization [Patient responsibility to plan of care] : patient responsibility to plan of care [Stable] : stable [Emergency Room] : Emergency Room [Stretcher] : Stretcher [Not Applicable - Long Term Care/Home Health Agency] : Long Term Care/Home Health Agency: Not Applicable [] : No [FreeTextEntry2] : Infection prevention Wound care (dressing changes) Maintain optimal skin integrity to high pressure areas Nutrition and wound healing Pressure relief/ Pressure redistribution Offloading the stress on skin structures and decreasing potential pathologic biomechanical influences. Hospitalization Sharp debridement [FreeTextEntry3] : Right heel with gas gangrene, emergent bedside debridement and transfer to ER [FreeTextEntry4] : F/U after hospitalization  Emergent authorization submitted for sharp debridement of right heel wound Patient tolerated procedure well, patient transferred to ER in stretcher report provided to RADU Rowe at [facility] ER. Tissue culture otained from right heel

## 2024-09-07 NOTE — REVIEW OF SYSTEMS
[FreeTextEntry1] : 1296 [Fever] : no fever [Chills] : no chills [Eye Pain] : no eye pain [Earache] : no earache [Loss Of Hearing] : no hearing loss [Chest Pain] : no chest pain [Shortness Of Breath] : no shortness of breath [Abdominal Pain] : no abdominal pain [Skin Wound] : skin wound [Limb Weakness] : limb weakness [Anxiety] : no anxiety [Easy Bleeding] : a tendency for easy bleeding [FreeTextEntry5] : htn, hld diabetic cardio vascular disease  [FreeTextEntry9] : s/p left foot TMA w/Achilles tenotomy [de-identified] : left foot DFU3, s/p TMA-  healed, right heel wound down to bone  [de-identified] : diabetic neuropathy [de-identified] : IDDM with neuropathy

## 2024-09-07 NOTE — PHYSICAL EXAM
[de-identified] : blanchable erythema [de-identified] : 50% [Abdominal Pad] : Abdominal Pad [4 x 4] : 4 x 4  [1+] : left 1+ [Ankle Swelling (On Exam)] : present [Ankle Swelling On The Left] : moderate [] : not present [Varicose Veins Of Lower Extremities] : not present [Skin Ulcer] : ulcer [Alert] : alert [Oriented to Person] : oriented to person [Oriented to Place] : oriented to place [Calm] : calm [de-identified] : calm [de-identified] : s/p left TMA and Achilles tenotomy, pt has been non ambulatory since february  [de-identified] : left foot TMA - closed.  Right posterior heel wound of skin, subcutaneous tissue, fat, and bone with malodor, purulent draiange, periwound erythema and fibronecrotic tissue  [de-identified] : IDDM with neuropathy  [FreeTextEntry1] : heel [FreeTextEntry2] : 12.0 [FreeTextEntry3] : 12.0 [FreeTextEntry4] : 0.5 [de-identified] : purulent drainage  [de-identified] : Tissue culture obtained [de-identified] : Dakins soaked gauze [de-identified] : Mechanically cleansed with sterile gauze and normal saline 0.9%  Post debridement measurement: 12.1cmx 12.2cmx 0.6cm [FreeTextEntry7] : heel [FreeTextEntry8] : 1.0 [FreeTextEntry9] : 1.0 [de-identified] : 0.1 [de-identified] : Dry dressing [de-identified] : Mechanically cleansed with sterile gauze and normal saline 0.9% [de-identified] : scattered areas of denuded epidermis within this measurement: MD Wooten reported that he will see the patient in the hospital, the right heel wound is emergent. Photo taken. [de-identified] : Sacrum [de-identified] : 10.0 [de-identified] : 10.0 [de-identified] : 0.1 [de-identified] : serous/green [de-identified] : complicated by incontinence associated dermatitis [de-identified] : denuded epidermis [de-identified] : TRIAD [de-identified] :  Mechanically cleansed with sterile gauze and normal saline 0.9% [TWNoteComboBox1] : Right [TWNoteComboBox4] : Large [TWNoteComboBox5] : No [TWNoteComboBox6] : Pressure [de-identified] : No [de-identified] : Erythema [de-identified] : Strong [de-identified] : 100% [de-identified] : None [de-identified] : No [de-identified] : Bone [TWNoteComboBox7] : Adam [de-identified] : Debridement performed of all devitalized tissue to bleeding viable tissue [de-identified] : Daily [de-identified] : Primary Dressing [TWNoteComboBox9] : Left [de-identified] : None [de-identified] : No [de-identified] : Pressure [de-identified] : No [de-identified] : Erythema [de-identified] : None [de-identified] : None [de-identified] : None [de-identified] : Yes [de-identified] : 3x Weekly [de-identified] : Primary Dressing [de-identified] : Moderate [de-identified] : No [de-identified] : Pressure [de-identified] : No [de-identified] : Normal [de-identified] : None [de-identified] : None [de-identified] : 50% [de-identified] : Yes [de-identified] : Daily [de-identified] : Primary Dressing

## 2024-09-07 NOTE — PROGRESS NOTE ADULT - SUBJECTIVE AND OBJECTIVE BOX
Date of Service 09-07-24 @ 16:36    Patient is a 54y old  Male who presents with a chief complaint of AF (07 Sep 2024 14:01)      INTERVAL /OVERNIGHT EVENTS: c/o right foot pain    MEDICATIONS  (STANDING):  aMIOdarone    Tablet   Oral   aMIOdarone    Tablet 400 milliGRAM(s) Oral every 8 hours  ascorbic acid 500 milliGRAM(s) Oral daily  atorvastatin 40 milliGRAM(s) Oral at bedtime  aztreonam  IVPB 1000 milliGRAM(s) IV Intermittent every 6 hours  cetirizine 10 milliGRAM(s) Oral daily  chlorhexidine 2% Cloths 1 Application(s) Topical <User Schedule>  cholecalciferol 1000 Unit(s) Oral daily  dextrose 5%. 1000 milliLiter(s) (100 mL/Hr) IV Continuous <Continuous>  dextrose 5%. 1000 milliLiter(s) (50 mL/Hr) IV Continuous <Continuous>  dextrose 50% Injectable 25 Gram(s) IV Push once  dextrose 50% Injectable 25 Gram(s) IV Push once  dextrose 50% Injectable 12.5 Gram(s) IV Push once  dextrose Oral Gel 15 Gram(s) Oral once  enoxaparin Injectable 100 milliGRAM(s) SubCutaneous every 12 hours  famotidine    Tablet 20 milliGRAM(s) Oral daily  ferrous    sulfate 325 milliGRAM(s) Oral two times a day  gabapentin 100 milliGRAM(s) Oral every 12 hours  glucagon  Injectable 1 milliGRAM(s) IntraMuscular once  insulin lispro (ADMELOG) corrective regimen sliding scale   SubCutaneous three times a day before meals  insulin lispro (ADMELOG) corrective regimen sliding scale   SubCutaneous at bedtime  melatonin 5 milliGRAM(s) Oral at bedtime  metoprolol tartrate 12.5 milliGRAM(s) Oral every 6 hours  midodrine. 15 milliGRAM(s) Oral three times a day  multivitamin 1 Tablet(s) Oral daily  mupirocin 2% Nasal 1 Application(s) Both Nostrils two times a day  naloxegol 25 milliGRAM(s) Oral daily  oxybutynin XL 10 milliGRAM(s) Oral at bedtime  oxyCODONE    IR 10 milliGRAM(s) Oral two times a day  pantoprazole    Tablet 40 milliGRAM(s) Oral before breakfast  polyethylene glycol 3350 17 Gram(s) Oral daily  senna 2 Tablet(s) Oral at bedtime  sucralfate 1 Gram(s) Oral two times a day  tiotropium 2.5 MICROgram(s)/olodaterol 2.5 MICROgram(s) (STIOLTO) Inhaler 2 Puff(s) Inhalation daily    MEDICATIONS  (PRN):  acetaminophen     Tablet .. 650 milliGRAM(s) Oral every 6 hours PRN Temp greater or equal to 38C (100.4F)  sodium chloride 0.65% Nasal 1 Spray(s) Both Nostrils daily PRN Nasal Congestion      Allergies    fish (Hives)  ertapenem (Blisters; Rash)    Intolerances        REVIEW OF SYSTEMS:  CONSTITUTIONAL: No fever, weight loss, or fatigue  EYES: No eye pain, visual disturbances, or discharge  ENMT:  No difficulty hearing, tinnitus, vertigo; No sinus or throat pain  NECK: No pain or stiffness  RESPIRATORY: No cough, wheezing, chills or hemoptysis; No shortness of breath  CARDIOVASCULAR: No chest pain, palpitations, dizziness, or leg swelling  GASTROINTESTINAL: No abdominal or epigastric pain. No nausea, vomiting, or hematemesis; No diarrhea or constipation. No melena or hematochezia.  GENITOURINARY: No dysuria, frequency, hematuria, or incontinence  NEUROLOGICAL: No headaches, memory loss, loss of strength, numbness, or tremors  SKIN: No itching, burning, rashes, or lesions   LYMPH NODES: No enlarged glands  ENDOCRINE: No heat or cold intolerance; No hair loss; No polydipsia or polyuria  MUSCULOSKELETAL: No joint pain or swelling; No muscle, back, or extremity pain  PSYCHIATRIC: No depression, anxiety, mood swings, or difficulty sleeping  HEME/LYMPH: No easy bruising, or bleeding gums  ALLERGY AND IMMUNOLOGIC: No hives or eczema    Vital Signs Last 24 Hrs  T(C): 37.1 (07 Sep 2024 15:28), Max: 37.1 (07 Sep 2024 15:28)  T(F): 98.8 (07 Sep 2024 15:28), Max: 98.8 (07 Sep 2024 15:28)  HR: 141 (07 Sep 2024 15:00) (99 - 146)  BP: 108/74 (07 Sep 2024 15:00) (85/62 - 130/77)  BP(mean): 83 (07 Sep 2024 15:00) (69 - 101)  RR: 14 (07 Sep 2024 15:00) (8 - 24)  SpO2: 98% (07 Sep 2024 15:00) (84% - 100%)        PHYSICAL EXAM:  GENERAL: NAD, well-groomed, well-developed  HEAD:  Atraumatic, Normocephalic  EYES: EOMI, PERRLA, conjunctiva and sclera clear  ENMT: No tonsillar erythema, exudates, or enlargement; Moist mucous membranes, Good dentition, No lesions  NECK: Supple, No JVD, Normal thyroid  NERVOUS SYSTEM:  Alert & Oriented X3, Good concentration; Motor Strength 5/5 B/L upper and lower extremities; DTRs 2+ intact and symmetric  CHEST/LUNG: Clear to auscultation bilaterally; No rales, rhonchi, wheezing, or rubs  HEART: Regular rate and rhythm; No murmurs, rubs, or gallops  ABDOMEN: Soft, Nontender, Nondistended; Bowel sounds present  EXTREMITIES:  2+ Peripheral Pulses, No clubbing, cyanosis, or edema  LYMPH: No lymphadenopathy noted  SKIN: right foot heel ulcer    LABS:                        8.5    13.00 )-----------( 556      ( 07 Sep 2024 06:20 )             27.1     07 Sep 2024 06:20    139    |  109    |  10     ----------------------------<  93     3.9     |  22     |  0.63     Ca    7.7        07 Sep 2024 06:20  Phos  2.5       07 Sep 2024 06:20  Mg     1.9       07 Sep 2024 06:20    TPro  4.9    /  Alb  1.2    /  TBili  0.5    /  DBili  x      /  AST  25     /  ALT  22     /  AlkPhos  181    07 Sep 2024 06:20    PT/INR - ( 06 Sep 2024 05:46 )   PT: 17.6 sec;   INR: 1.56 ratio         PTT - ( 06 Sep 2024 05:46 )  PTT:36.5 sec  Urinalysis Basic - ( 07 Sep 2024 06:20 )    Color: x / Appearance: x / SG: x / pH: x  Gluc: 93 mg/dL / Ketone: x  / Bili: x / Urobili: x   Blood: x / Protein: x / Nitrite: x   Leuk Esterase: x / RBC: x / WBC x   Sq Epi: x / Non Sq Epi: x / Bacteria: x      CAPILLARY BLOOD GLUCOSE      POCT Blood Glucose.: 79 mg/dL (07 Sep 2024 11:33)  POCT Blood Glucose.: 96 mg/dL (07 Sep 2024 07:47)  POCT Blood Glucose.: 109 mg/dL (06 Sep 2024 21:36)  POCT Blood Glucose.: 80 mg/dL (06 Sep 2024 16:48)      RADIOLOGY & ADDITIONAL TESTS:    Notes Reviewed:  [x ] YES  [ ] NO    Care Discussed with Consultants/Other Providers [x ] YES  [ ] NO

## 2024-09-07 NOTE — REVIEW OF SYSTEMS
[FreeTextEntry1] : 4106 [Fever] : no fever [Chills] : no chills [Eye Pain] : no eye pain [Earache] : no earache [Loss Of Hearing] : no hearing loss [Chest Pain] : no chest pain [Shortness Of Breath] : no shortness of breath [Abdominal Pain] : no abdominal pain [Skin Wound] : skin wound [Limb Weakness] : limb weakness [Anxiety] : no anxiety [Easy Bleeding] : a tendency for easy bleeding [FreeTextEntry5] : htn, hld diabetic cardio vascular disease  [FreeTextEntry9] : s/p left foot TMA w/Achilles tenotomy [de-identified] : left foot DFU3, s/p TMA-  healed, right heel wound down to bone  [de-identified] : diabetic neuropathy [de-identified] : IDDM with neuropathy

## 2024-09-07 NOTE — DIETITIAN INITIAL EVALUATION ADULT - PERTINENT MEDS FT
MEDICATIONS  (STANDING):  aMIOdarone    Tablet 400 milliGRAM(s) Oral every 8 hours  aMIOdarone    Tablet   Oral   ascorbic acid 500 milliGRAM(s) Oral daily  atorvastatin 40 milliGRAM(s) Oral at bedtime  aztreonam  IVPB 1000 milliGRAM(s) IV Intermittent every 6 hours  cetirizine 10 milliGRAM(s) Oral daily  chlorhexidine 2% Cloths 1 Application(s) Topical <User Schedule>  cholecalciferol 1000 Unit(s) Oral daily  dextrose 5%. 1000 milliLiter(s) (100 mL/Hr) IV Continuous <Continuous>  dextrose 5%. 1000 milliLiter(s) (50 mL/Hr) IV Continuous <Continuous>  dextrose 50% Injectable 12.5 Gram(s) IV Push once  dextrose 50% Injectable 25 Gram(s) IV Push once  dextrose 50% Injectable 25 Gram(s) IV Push once  dextrose Oral Gel 15 Gram(s) Oral once  enoxaparin Injectable 100 milliGRAM(s) SubCutaneous every 12 hours  famotidine    Tablet 20 milliGRAM(s) Oral daily  ferrous    sulfate 325 milliGRAM(s) Oral two times a day  gabapentin 100 milliGRAM(s) Oral every 12 hours  glucagon  Injectable 1 milliGRAM(s) IntraMuscular once  insulin lispro (ADMELOG) corrective regimen sliding scale   SubCutaneous at bedtime  insulin lispro (ADMELOG) corrective regimen sliding scale   SubCutaneous three times a day before meals  lactated ringers. 1000 milliLiter(s) (100 mL/Hr) IV Continuous <Continuous>  melatonin 5 milliGRAM(s) Oral at bedtime  midodrine. 15 milliGRAM(s) Oral three times a day  multivitamin 1 Tablet(s) Oral daily  naloxegol 25 milliGRAM(s) Oral daily  oxybutynin XL 10 milliGRAM(s) Oral at bedtime  oxyCODONE    IR 10 milliGRAM(s) Oral two times a day  pantoprazole    Tablet 40 milliGRAM(s) Oral before breakfast  polyethylene glycol 3350 17 Gram(s) Oral daily  senna 2 Tablet(s) Oral at bedtime  sucralfate 1 Gram(s) Oral two times a day  tiotropium 2.5 MICROgram(s)/olodaterol 2.5 MICROgram(s) (STIOLTO) Inhaler 2 Puff(s) Inhalation daily    MEDICATIONS  (PRN):  acetaminophen     Tablet .. 650 milliGRAM(s) Oral every 6 hours PRN Temp greater or equal to 38C (100.4F)  sodium chloride 0.65% Nasal 1 Spray(s) Both Nostrils daily PRN Nasal Congestion

## 2024-09-07 NOTE — PROGRESS NOTE ADULT - SUBJECTIVE AND OBJECTIVE BOX
INTERVAL HPI/OVERNIGHT EVENTS: Remained in rapid afib overnight, requiring Lopressor IVP.     SUBJECTIVE: Seen and examined pt at bedside. Patient with notable pain in right heel with manipulation. Pt discouraged by discussion to amputate the lower extremity; would like to exhaust all     ICU Vital Signs Last 24 Hrs  T(C): 37.1 (07 Sep 2024 15:28), Max: 37.1 (07 Sep 2024 15:28)  T(F): 98.8 (07 Sep 2024 15:28), Max: 98.8 (07 Sep 2024 15:28)  HR: 141 (07 Sep 2024 15:00) (99 - 146)  BP: 108/74 (07 Sep 2024 15:00) (85/62 - 130/77)  BP(mean): 83 (07 Sep 2024 15:00) (69 - 101)  ABP: --  ABP(mean): --  RR: 14 (07 Sep 2024 15:00) (8 - 24)  SpO2: 98% (07 Sep 2024 15:00) (84% - 100%)          09-06-24 @ 07:01  -  09-07-24 @ 07:00  --------------------------------------------------------  IN: 3150.3 mL / OUT: 2130 mL / NET: 1020.3 mL    09-07-24 @ 07:01  -  09-07-24 @ 16:31  --------------------------------------------------------  IN: 1190 mL / OUT: 990 mL / NET: 200 mL        CAPILLARY BLOOD GLUCOSE      POCT Blood Glucose.: 79 mg/dL (07 Sep 2024 11:33)      I&O's Summary    06 Sep 2024 07:01  -  07 Sep 2024 07:00  --------------------------------------------------------  IN: 3150.3 mL / OUT: 2130 mL / NET: 1020.3 mL    07 Sep 2024 07:01  -  07 Sep 2024 16:31  --------------------------------------------------------  IN: 1190 mL / OUT: 990 mL / NET: 200 mL        PHYSICAL EXAM:  General: NAD  Neurology: awake and alert  HEENT: NC/AT  Respiratory: CTA b/l, no rales or rhonchi noted  Cardiovascular: RRR, normal S1S2, no M/R/G  Abdomen: soft, NT/ND, +BS, no palpable masses  Extremities: WWP, no clubbing, cyanosis, or edema  Skin: warm/dry      Meds:  aztreonam  IVPB IV Intermittent    aMIOdarone    Tablet Oral  aMIOdarone    Tablet Oral  metoprolol tartrate Oral  midodrine. Oral    atorvastatin Oral  dextrose 50% Injectable IV Push  dextrose 50% Injectable IV Push  dextrose 50% Injectable IV Push  dextrose Oral Gel Oral  glucagon  Injectable IntraMuscular  insulin lispro (ADMELOG) corrective regimen sliding scale SubCutaneous  insulin lispro (ADMELOG) corrective regimen sliding scale SubCutaneous    cetirizine Oral  tiotropium 2.5 MICROgram(s)/olodaterol 2.5 MICROgram(s) (STIOLTO) Inhaler Inhalation    acetaminophen     Tablet .. Oral PRN  gabapentin Oral  melatonin Oral  oxyCODONE    IR Oral      enoxaparin Injectable SubCutaneous    famotidine    Tablet Oral  naloxegol Oral  pantoprazole    Tablet Oral  polyethylene glycol 3350 Oral  senna Oral  sucralfate Oral    oxybutynin XL Oral    ascorbic acid Oral  cholecalciferol Oral  dextrose 5%. IV Continuous  dextrose 5%. IV Continuous  ferrous    sulfate Oral  multivitamin Oral      chlorhexidine 2% Cloths Topical  mupirocin 2% Nasal Both Nostrils  sodium chloride 0.65% Nasal Both Nostrils PRN                              8.5    13.00 )-----------( 556      ( 07 Sep 2024 06:20 )             27.1       09-07    139  |  109<H>  |  10  ----------------------------<  93  3.9   |  22  |  0.63    Ca    7.7<L>      07 Sep 2024 06:20  Phos  2.5     09-07  Mg     1.9     09-07    TPro  4.9<L>  /  Alb  1.2<L>  /  TBili  0.5  /  DBili  x   /  AST  25  /  ALT  22  /  AlkPhos  181<H>  09-07          PT/INR - ( 06 Sep 2024 05:46 )   PT: 17.6 sec;   INR: 1.56 ratio         PTT - ( 06 Sep 2024 05:46 )  PTT:36.5 sec  Urinalysis Basic - ( 07 Sep 2024 06:20 )    Color: x / Appearance: x / SG: x / pH: x  Gluc: 93 mg/dL / Ketone: x  / Bili: x / Urobili: x   Blood: x / Protein: x / Nitrite: x   Leuk Esterase: x / RBC: x / WBC x   Sq Epi: x / Non Sq Epi: x / Bacteria: x      Swab Swab   Moderate Escherichia coli ESBL  Few Citrobacter freundii  Rare Providencia stuartii  Few Corynebacterium striatum group "Susceptibilities not performed" -- 09-05 @ 12:55  .Blood Blood-Peripheral   No growth at 48 Hours -- 09-05 @ 10:25  .Blood Blood-Peripheral   No growth at 48 Hours -- 09-05 @ 10:20  Tissue Other, Wound   Numerous Proteus mirabilis  Rare Citrobacter freundii  Moderate Enterococcus avium   Rare polymorphonuclear leukocytes per low power field  Numerous Gram Negative Rods per oil power field  Moderate Gram Positive Cocci per oil power field 09-05 @ 09:08            Tubes/Lines: jacques      GLOBAL ISSUE/BEST PRACTICE:  Analgesia: Y  Sedation: NA  HOB elevation: Y  Stress ulcer prophylaxis: Y  VTE prophylaxis: Y  Glycemic control: Y  Nutrition: Y    CODE STATUS:  FULL       INTERVAL HPI/OVERNIGHT EVENTS: Remained in rapid afib overnight, requiring Lopressor IVP.     SUBJECTIVE: Seen and examined pt at bedside. Patient with notable pain in right heel with manipulation. Pt discouraged by discussion to amputate the lower extremity; would like to exhaust all other options prior to amputation.     ICU Vital Signs Last 24 Hrs  T(C): 37.1 (07 Sep 2024 15:28), Max: 37.1 (07 Sep 2024 15:28)  T(F): 98.8 (07 Sep 2024 15:28), Max: 98.8 (07 Sep 2024 15:28)  HR: 141 (07 Sep 2024 15:00) (99 - 146)  BP: 108/74 (07 Sep 2024 15:00) (85/62 - 130/77)  BP(mean): 83 (07 Sep 2024 15:00) (69 - 101)  ABP: --  ABP(mean): --  RR: 14 (07 Sep 2024 15:00) (8 - 24)  SpO2: 98% (07 Sep 2024 15:00) (84% - 100%)          09-06-24 @ 07:01  -  09-07-24 @ 07:00  --------------------------------------------------------  IN: 3150.3 mL / OUT: 2130 mL / NET: 1020.3 mL    09-07-24 @ 07:01  -  09-07-24 @ 16:31  --------------------------------------------------------  IN: 1190 mL / OUT: 990 mL / NET: 200 mL        CAPILLARY BLOOD GLUCOSE      POCT Blood Glucose.: 79 mg/dL (07 Sep 2024 11:33)      I&O's Summary    06 Sep 2024 07:01  -  07 Sep 2024 07:00  --------------------------------------------------------  IN: 3150.3 mL / OUT: 2130 mL / NET: 1020.3 mL    07 Sep 2024 07:01  -  07 Sep 2024 16:31  --------------------------------------------------------  IN: 1190 mL / OUT: 990 mL / NET: 200 mL        PHYSICAL EXAM:  GENERAL: patient appears well, no acute distress, appropriately interactive  LUNGS: good air entry bilaterally, clear to auscultation, symmetric breath sounds, no wheezing or rhonchi appreciated  HEART: rapid afib, no murmurs noted, bilateral L/E   GASTROINTESTINAL: abdomen is soft, nontender, nondistended, normoactive bowel sounds  INTEGUMENT: good skin turgor, warm skin, appears well perfused  MUSCULOSKELETAL: left LE digit amputation, right heel wrapped   NEUROLOGIC: awake, alert, oriented x3   HEME/LYMPH: no obvious ecchymosis or petechiae     Meds:  aztreonam  IVPB IV Intermittent    aMIOdarone    Tablet Oral  aMIOdarone    Tablet Oral  metoprolol tartrate Oral  midodrine. Oral    atorvastatin Oral  dextrose 50% Injectable IV Push  dextrose 50% Injectable IV Push  dextrose 50% Injectable IV Push  dextrose Oral Gel Oral  glucagon  Injectable IntraMuscular  insulin lispro (ADMELOG) corrective regimen sliding scale SubCutaneous  insulin lispro (ADMELOG) corrective regimen sliding scale SubCutaneous    cetirizine Oral  tiotropium 2.5 MICROgram(s)/olodaterol 2.5 MICROgram(s) (STIOLTO) Inhaler Inhalation    acetaminophen     Tablet .. Oral PRN  gabapentin Oral  melatonin Oral  oxyCODONE    IR Oral      enoxaparin Injectable SubCutaneous    famotidine    Tablet Oral  naloxegol Oral  pantoprazole    Tablet Oral  polyethylene glycol 3350 Oral  senna Oral  sucralfate Oral    oxybutynin XL Oral    ascorbic acid Oral  cholecalciferol Oral  dextrose 5%. IV Continuous  dextrose 5%. IV Continuous  ferrous    sulfate Oral  multivitamin Oral      chlorhexidine 2% Cloths Topical  mupirocin 2% Nasal Both Nostrils  sodium chloride 0.65% Nasal Both Nostrils PRN                              8.5    13.00 )-----------( 556      ( 07 Sep 2024 06:20 )             27.1       09-07    139  |  109<H>  |  10  ----------------------------<  93  3.9   |  22  |  0.63    Ca    7.7<L>      07 Sep 2024 06:20  Phos  2.5     09-07  Mg     1.9     09-07    TPro  4.9<L>  /  Alb  1.2<L>  /  TBili  0.5  /  DBili  x   /  AST  25  /  ALT  22  /  AlkPhos  181<H>  09-07          PT/INR - ( 06 Sep 2024 05:46 )   PT: 17.6 sec;   INR: 1.56 ratio         PTT - ( 06 Sep 2024 05:46 )  PTT:36.5 sec  Urinalysis Basic - ( 07 Sep 2024 06:20 )    Color: x / Appearance: x / SG: x / pH: x  Gluc: 93 mg/dL / Ketone: x  / Bili: x / Urobili: x   Blood: x / Protein: x / Nitrite: x   Leuk Esterase: x / RBC: x / WBC x   Sq Epi: x / Non Sq Epi: x / Bacteria: x      Swab Swab   Moderate Escherichia coli ESBL  Few Citrobacter freundii  Rare Providencia stuartii  Few Corynebacterium striatum group "Susceptibilities not performed" -- 09-05 @ 12:55  .Blood Blood-Peripheral   No growth at 48 Hours -- 09-05 @ 10:25  .Blood Blood-Peripheral   No growth at 48 Hours -- 09-05 @ 10:20  Tissue Other, Wound   Numerous Proteus mirabilis  Rare Citrobacter freundii  Moderate Enterococcus avium   Rare polymorphonuclear leukocytes per low power field  Numerous Gram Negative Rods per oil power field  Moderate Gram Positive Cocci per oil power field 09-05 @ 09:08            Tubes/Lines: sawyer      GLOBAL ISSUE/BEST PRACTICE:  Analgesia: Y  Sedation: NA  HOB elevation: Y  Stress ulcer prophylaxis: Y  VTE prophylaxis: Y  Glycemic control: Y  Nutrition: Y    CODE STATUS:  FULL

## 2024-09-07 NOTE — VITALS
[Pain related to present condition?] : The patient's  pain is related to present condition. [Sharp] : sharp [Throbbing] : throbbing [Continuous] : continuous [] : No [de-identified] : 10/10 [FreeTextEntry3] : right heel [FreeTextEntry1] : offloading pressure [FreeTextEntry2] : pressure on heel and wound care [FreeTextEntry4] : elevation

## 2024-09-07 NOTE — PROGRESS NOTE ADULT - ASSESSMENT
54 year old male with a PMH of afib on Eliquis, indwelling jacques, CAD s/p stents, CVA, DM2, HTN, R heel OM s/p debridement, PE who presented to the ED from wound care with hypotension    Problem list:  Hypotension  Afib RVR  Sepsis   R heel ulcer     Neuro:  -Oxy 10 mg BId  -Acetaminophen 650mg q6h prn Fever  -Cont neurontin    CV:  -Afib due to sepsis  -Hypertension  -Amio drip finishing  -Dig load now, consider starting lopressor if hr not responding  -Midodrine 15mg TID started    Pulm:  -Cont Cetirizine  -Cont Tiotropium    GI:  -Senna at bedtime  Diet consistent carb  -Fecal occult results pending  -Cont Mobantic    Renal:  -Replete electrolytes as needed  -Mg 4g now  -Potassium 20milli now  -Cont LR for now  -Iron studies, f/u results    ID:  -Osteomyelitis right foot  -Cont aztreonam    Heme:  -Lovenox 100 BID DVT ppx  - HGB 7.3    Endo:  -Sliding scale  -Monitor blood glucose levels  -D5 to start if glucose <100     --mentating well  oxycodone and neurontin for pain control  --afib poorly controlled  continue amio  dig load in progress   if no improvement with 2nd dose digoxin then start low dose lopressor if BP allows  AC with lovenox  hypotension, increase midodrine to 15 q8  --stable respiratory status  --PO diet, continue bowel regimen  --normal renal function  aggressive electrolyte replacement  --R heel osteo, continue aztreonam  discuss plan with podiatry and vascular, pt wishes to hold off on amputation in favor of wound debridement at this point  --anemia, check FOBT, iron panel  no signs of bleeding  --DM2, mild hypolgycemia, may require D5  --plan discussed with pt .     54 year old male with a PMH of afib on Eliquis, indwelling jacques, CAD s/p stents, CVA, DM2, HTN, R heel OM s/p debridement, PE who presented to the ED from wound care with hypotension    Neuro:  - Oxy 10 mg BId   - Acetaminophen 650mg q6h prn Fever  - Continue neurontin  - Dilaudid PRN for dressing changes     CV:  - Afib due to sepsis  - S/p Amio gtt, Digoxin load   - Dig level in AM   - START Lopressor 12.5 mg q6   - Midodrine 15mg TID started    Pulm:  - Cont Cetirizine  - Cont Tiotropium    GI:  - Senna at bedtime  - Diet consistent carb  - Fecal occult results pending  - Cont Mobantic    Renal:  - Replete electrolytes as needed    ID:  -Osteomyelitis right foot  -Cont aztreonam    Heme:  - Lovenox 100 BID DVT ppx  - Anemia, rise in Hgb today   - Iron studies, consistent with anemia of chronic disease     Endo:  -Sliding scale  -Monitor blood glucose levels  -D5 to start if glucose <100

## 2024-09-07 NOTE — VITALS
[Pain related to present condition?] : The patient's  pain is related to present condition. [Sharp] : sharp [Throbbing] : throbbing [Continuous] : continuous [] : No [de-identified] : 10/10 [FreeTextEntry3] : right heel [FreeTextEntry2] : pressure on heel and wound care [FreeTextEntry1] : offloading pressure [FreeTextEntry4] : elevation

## 2024-09-07 NOTE — DIETITIAN INITIAL EVALUATION ADULT - ETIOLOGY
related to increased demand for nutrient (kcal/protein, micronutrients) related to decreased ability to consume sufficient energy

## 2024-09-07 NOTE — DIETITIAN INITIAL EVALUATION ADULT - REASON
Deferred; pt sleeping soundly during visit  Observable signs of moderate temporal depletion, will follow for NFPE when medically feasible

## 2024-09-07 NOTE — PROGRESS NOTE ADULT - SUBJECTIVE AND OBJECTIVE BOX
55yo male with past medical history of AF, indwelling Aguilera, CAD s/p stents, CVA, DM2, Hypertension, osteomyelitis s/p debridement, bib ems from wound care with hypotension and poss gangrenous foot which Vascular surgery consulted. As per podiatry, patient will need surgical intervention with debridement and bone biopsy of the right heel. Patient is not hemodynamically stable for surgical intervention at this time. From a vascular perspective pt may proceed with podiatry for surgical intervention for source control.  Patient seen and examined at bedside this morning in NAD. Patient reports he has DM neuropathy therefore has very little sensation of his feet. Denies any pain in the legs or feet. Denies cramping of the legs. Denies rest pain.     Discussed with patient podiatry's plan for debridement.  55yo male with past medical history of AF, indwelling Aguilera, CAD s/p stents, CVA, DM2, Hypertension, osteomyelitis s/p debridement, bib ems from wound care with hypotension and poss gangrenous foot which Vascular surgery consulted. As per podiatry, patient will need surgical intervention with debridement and bone biopsy of the right heel. Patient is not hemodynamically stable for surgical intervention at this time. From a vascular perspective pt may proceed with podiatry for surgical intervention for source control.  Patient seen and examined at bedside this morning in NAD. Patient reports he has DM neuropathy therefore has very little sensation of his feet. Denies any pain in the legs or feet. Denies cramping of the legs. Denies rest pain. Arterial duplex completed which demonstrated "Diffuse abnormal monophasic flow throughout R leg. PT and peroneal arteries not seen. Occlusion is not excluded."    Discussed with patient podiatry's plan for debridement and bone biopsy. Explained to patient he has a high risk of right lower extremity limb loss, below the knee vs above the knee. Patient states he understands, he would like to find other alternatives if possible. Patient understands life vs limb.     Vital Signs Last 24 Hrs  T(C): 36.6 (07 Sep 2024 12:00), Max: 37 (07 Sep 2024 04:50)  T(F): 97.9 (07 Sep 2024 12:00), Max: 98.6 (07 Sep 2024 04:50)  HR: 117 (07 Sep 2024 11:00) (99 - 146)  BP: 112/69 (07 Sep 2024 11:00) (85/62 - 129/75)  BP(mean): 86 (07 Sep 2024 11:00) (69 - 98)  RR: 14 (07 Sep 2024 11:00) (9 - 24)  SpO2: 100% (07 Sep 2024 11:00) (84% - 100%)    PHYSICAL EXAM:  GENERAL: NAD, lying in bed comfortably, NAD  CHEST/LUNG: Breathing comfortably on RA  HEART: Remains in ICU monitor, HR tachy 117 BPM  EXTREMITIES, Bilaterally:  diminished sensation of the bilateral feet due to DM neuropathy. Mild edema with chronic venous stasis, left foot with healed TMA. Right foot wrapped in dressing, malodor, dressing is dry/clean. Compartments soft and compressible, legs are warm to touch, sensation is intact   NERVOUS SYSTEM:  Alert & Oriented X3, speech clear. No deficits   Nonpalpable pedal pulses BL.       LABS:                          8.5    13.00 )-----------( 556      ( 07 Sep 2024 06:20 )             27.1     09-07    139  |  109<H>  |  10  ----------------------------<  93  3.9   |  22  |  0.63    Ca    7.7<L>      07 Sep 2024 06:20  Phos  2.5     09-07  Mg     1.9     09-07    TPro  4.9<L>  /  Alb  1.2<L>  /  TBili  0.5  /  DBili  x   /  AST  25  /  ALT  22  /  AlkPhos  181<H>  09-07    LIVER FUNCTIONS - ( 07 Sep 2024 06:20 )  < from: VA Duplex Low Ext Arterial, Ltd, Right (09.06.24 @ 12:40) >      < end of copied text >  Alb: 1.2 g/dL / Pro: 4.9 g/dL / ALK PHOS: 181 U/L / ALT: 22 U/L / AST: 25 U/L / GGT: x           PT/INR - ( 06 Sep 2024 05:46 )   PT: 17.6 sec;   INR: 1.56 ratio         PTT - ( 06 Sep 2024 05:46 )  PTT:36.5 sec  Urinalysis Basic - ( 07 Sep 2024 06:20 )    Color: x / Appearance: x / SG: x / pH: x  Gluc: 93 mg/dL / Ketone: x  / Bili: x / Urobili: x   Blood: x / Protein: x / Nitrite: x   Leuk Esterase: x / RBC: x / WBC x   Sq Epi: x / Non Sq Epi: x / Bacteria: x    < from: VA Duplex Low Ext Arterial, Ltd, Right (09.06.24 @ 12:40) >    ACC: [id number] EXAM:  DUPLEX LOW ARTERIES UNI LTD RT   ORDERED BY: JEFF HAYES     PROCEDURE DATE:  09/06/2024          INTERPRETATION:  US LOWER EXTREMITY ARTERIAL DUPLEX LIMITED RIGHT    CLINICAL INFORMATION:  Peripheral artery disease with non-palpable pulses   in right heel wound    COMPARISON: None    Real-time sonography of the right lower extremity arterial system was   performed using a high-resolution linear array transducer and including   color and spectral Doppler.    Mild scattered plaque.. No soft tissue abnormalities are demonstrated in   the right lower extremity.  Flow phase patterns and peak systolic   velocity measurements (in cm/s) were observed as follows:    RIGHT:  CFA: Monophasic; 84  Proximal SFA: Monophasic;77  Mid SFA: Monophasic; 81  Distal SFA: Monophasic;  62  Popliteal: Monophasic;  80, 93  Anterior tibial: Monophasic; 87, 87, 85  Posterior tibial: Not seen;  Peroneal: Not seen;  Dorsalis pedis: Monophasic;  10    IMPRESSION:  *  Diffuse abnormal monophasic flow throughout the right leg.  *  Posterior tibial and peroneal arteries are not seen. Occlusion is not   excluded.    --- End of Report ---    LEILA STONE MD; Attending Radiologist  This document has been electronically signed. Sep 6 2024  5:48PM    < end of copied text >    ASSESSMENT AND PLAN  55yo male with h/o AF, indwelling Aguilera, CAD s/p stents, CVA, DMT 2, Hypertension, osteomyelitis s/p debridement, BIBEMS from wound care with hypotension and poss gangrenous foot  Vascular surgery consulted. Podiatry for debridement for source control and bone biopsy of right heel, if patient is not hemodynamically stable for surgical intervention at this time, recommending bedside I&D. Positive for MRSA and staph. Arterial duplex completed which demonstrated "Diffuse abnormal monophasic flow throughout R leg. PT and peroneal arteries not seen. Occlusion is not excluded." High risk of right lower extremity limb loss, BKA VS AKA; may require guillotine.     -Podiatry following, dressing changes per podiatry and recommend bedside I&D if patient is not hemodynamically stable for surgical intervention at this time  -ID for antibiotic recs  -Pain control PRN  -Recommend obtaining CPK value  -Leg elevation at rest for leg edema  -Physical therapy evaluation  -DM management per primary team  -Rest of primary team  -Will continue to follow  -Discussed with attending

## 2024-09-07 NOTE — PROGRESS NOTE ADULT - PROBLEM SELECTOR PLAN 1
Patient evaluated and chart reviewed.  Discussed severity of patient's condition.  Expressed approximately 5cc's or purulent drainage from heel wound and reapplied DSD.  Patient is at high risk for loss of limb.  Patient is not hemodynamically stable for OR.  Continue IV abx per ID recs.  Swab results pending.   Elevate lower extremity.   Podiatry will continue to follow closely.  Plan discussed with attending.

## 2024-09-07 NOTE — PROGRESS NOTE ADULT - ASSESSMENT
53 YO M with past medical history of AFib (on Eliquis), s/p cardiac arrest x 2 w/ AHRF (on recent admission), indwelling Aguilera, cerebral aneurysm, CAD (s/p stents), CVA, T2DM, HTN, OM (s/p debridement), PE, perforated gastric ulcer s/p ometnopexy 2019 with Dr. Mejia who presents with possible gangrenous right foot. In ED pt found to be in afib with RVR and hypotension.    - pt with septic shock from likely gangrenous foot.   - appears sig volume depleted  - agree with IVF  - RRT 9/5 @1620 for rapid AF and hypotension  - s/p Cardizem 15 mg IV x 1, briefly on cardizem gtt (stopped by ICU), s/p Digoxin 500 mcg IVP x 1, then 250 x 2 on 9/6  - Start digoxin 125 pO QD as he remains in RVR  - Resume calcium channel blocker with diltiazem 30 tid.  Hold for SBP less than 90  - amiodarone gtt started for rate control. now on oral load with 400 tid to 5 g followed by 200 qd  -c/w midodrine 5mg TID for bp support.      - pt on eliquis at home.   Now on full dose Lovenox     - EKG nonischemic  - Will need for optimization of HR prior to OR. Sepsis likely driving AF w rvr.   - Monitor and replete lytes, keep K>4, Mg>2.  - Will continue to follow.

## 2024-09-07 NOTE — ADDENDUM
[FreeTextEntry1] : Patient being seen as a 4week follow up. Patient was hypotensive, with possible being septic. Patient was transported to the ER for admission and IV antibiotics and further treatment and evaluation. Bed side debridement performed for source control at this time. Patient will need surgical intervention once hemodynamically stable

## 2024-09-07 NOTE — PHYSICAL EXAM
[de-identified] : blanchable erythema [de-identified] : 50% [Abdominal Pad] : Abdominal Pad [4 x 4] : 4 x 4  [1+] : left 1+ [Ankle Swelling (On Exam)] : present [Ankle Swelling On The Left] : moderate [] : not present [Varicose Veins Of Lower Extremities] : not present [Skin Ulcer] : ulcer [Alert] : alert [Oriented to Person] : oriented to person [Oriented to Place] : oriented to place [Calm] : calm [de-identified] : calm [de-identified] : s/p left TMA and Achilles tenotomy, pt has been non ambulatory since february  [de-identified] : left foot TMA - closed.  Right posterior heel wound of skin, subcutaneous tissue, fat, and bone with malodor, purulent draiange, periwound erythema and fibronecrotic tissue  [de-identified] : IDDM with neuropathy  [FreeTextEntry1] : heel [FreeTextEntry2] : 12.0 [FreeTextEntry3] : 12.0 [FreeTextEntry4] : 0.5 [de-identified] : purulent drainage  [de-identified] : Tissue culture obtained [de-identified] : Dakins soaked gauze [de-identified] : Mechanically cleansed with sterile gauze and normal saline 0.9%  Post debridement measurement: 12.1cmx 12.2cmx 0.6cm [FreeTextEntry7] : heel [FreeTextEntry8] : 1.0 [FreeTextEntry9] : 1.0 [de-identified] : 0.1 [de-identified] : Dry dressing [de-identified] : Mechanically cleansed with sterile gauze and normal saline 0.9% [de-identified] : scattered areas of denuded epidermis within this measurement: MD Wooten reported that he will see the patient in the hospital, the right heel wound is emergent. Photo taken. [de-identified] : Sacrum [de-identified] : 10.0 [de-identified] : 10.0 [de-identified] : 0.1 [de-identified] : serous/green [de-identified] : complicated by incontinence associated dermatitis [de-identified] : denuded epidermis [de-identified] : TRIAD [de-identified] :  Mechanically cleansed with sterile gauze and normal saline 0.9% [TWNoteComboBox1] : Right [TWNoteComboBox4] : Large [TWNoteComboBox5] : No [TWNoteComboBox6] : Pressure [de-identified] : No [de-identified] : Erythema [de-identified] : Strong [de-identified] : 100% [de-identified] : None [de-identified] : No [de-identified] : Bone [TWNoteComboBox7] : Adam [de-identified] : Debridement performed of all devitalized tissue to bleeding viable tissue [de-identified] : Daily [de-identified] : Primary Dressing [TWNoteComboBox9] : Left [de-identified] : None [de-identified] : No [de-identified] : Pressure [de-identified] : No [de-identified] : Erythema [de-identified] : None [de-identified] : None [de-identified] : None [de-identified] : Yes [de-identified] : 3x Weekly [de-identified] : Primary Dressing [de-identified] : Moderate [de-identified] : No [de-identified] : Pressure [de-identified] : No [de-identified] : Normal [de-identified] : None [de-identified] : None [de-identified] : 50% [de-identified] : Yes [de-identified] : Daily [de-identified] : Primary Dressing

## 2024-09-07 NOTE — ASSESSMENT
[Written] : Written [Verbal] : Verbal [Patient] : Patient [Caregiver] : Caregiver [Good - alert, interested, motivated] : Good - alert, interested, motivated [Verbalizes knowledge/Understanding] : Verbalizes knowledge/understanding [Dressing changes] : dressing changes [Skin Care] : skin care [Pressure relief] : pressure relief [Signs and symptoms of infection] : sign and symptoms of infection [Surgery] : surgery [Nutrition] : nutrition [How and When to Call] : how and when to call [Labs and Tests] : labs and tests [Pain Management] : pain management [Off-loading] : off-loading [Hospitalization] : hospitalization [Patient responsibility to plan of care] : patient responsibility to plan of care [Stable] : stable [Emergency Room] : Emergency Room [Stretcher] : Stretcher [Not Applicable - Long Term Care/Home Health Agency] : Long Term Care/Home Health Agency: Not Applicable [] : No [FreeTextEntry2] : Infection prevention Wound care (dressing changes) Maintain optimal skin integrity to high pressure areas Nutrition and wound healing Pressure relief/ Pressure redistribution Offloading the stress on skin structures and decreasing potential pathologic biomechanical influences. Hospitalization Sharp debridement [FreeTextEntry3] : Right heel with gas gangrene, emergent bedside debridement and transfer to ER [FreeTextEntry4] : F/U after hospitalization  Emergent authorization submitted for sharp debridement of right heel wound Patient tolerated procedure well, patient transferred to ER in stretcher report provided to [facility] at [facility] ER. Tissue culture otained from right heel

## 2024-09-07 NOTE — DIETITIAN INITIAL EVALUATION ADULT - OTHER INFO
Pt is a "54 year old male with a PMH of afib on Eliquis, indwelling jacques, CAD s/p stents, CVA, DM2, HTN, R heel OM s/p debridement, PE who presented to the ED from wound care with hypotension."    Visited pt at bedside this am. Pt sleeping soundly during visit. Did not disturb at this time.  Pt is a "54 year old male with a PMH of afib on Eliquis, indwelling jacques, CAD s/p stents, CVA, DM2, HTN, R heel OM s/p debridement, PE who presented to the ED from wound care with hypotension."    Visited pt at bedside this am. Pt sleeping soundly during visit. Did not disturb at this time. Spoke with RN, pt has been sleeping through breakfast meal this am Pt is a "54 year old male with a PMH of afib on Eliquis, indwelling jacques, CAD s/p stents, CVA, DM2, HTN, R heel OM s/p debridement, PE who presented to the ED from wound care with hypotension."    Visited pt at bedside this am. Pt sleeping soundly during visit. Did not disturb at this time. Spoke with RN, pt has been sleeping through breakfast meal this am. No po intakes documented thus far. Fish allergy noted. No chewing/swallowing difficulties noted. No N/V/D/C per chart review. No BMs thus far; bowel regimen rx. CBW on admission 211#. Previous admission wt of 224# 6/2024. 3+ BL leg, 3+ R foot edema noted. Receiving IVFs; LR@100ml/hr. Pt currently on consistent carbohydrate diet. Diet education not appropriate at this time. Recommend assistance/encouragement at meal times as needed. RD remains available and will continue to follow-up.

## 2024-09-07 NOTE — DIETITIAN INITIAL EVALUATION ADULT - PERTINENT LABORATORY DATA
09-07    139  |  109<H>  |  10  ----------------------------<  93  3.9   |  22  |  0.63    Ca    7.7<L>      07 Sep 2024 06:20  Phos  2.5     09-07  Mg     1.9     09-07    TPro  4.9<L>  /  Alb  1.2<L>  /  TBili  0.5  /  DBili  x   /  AST  25  /  ALT  22  /  AlkPhos  181<H>  09-07  POCT Blood Glucose.: 96 mg/dL (09-07-24 @ 07:47)  A1C with Estimated Average Glucose Result: 6.8 % (09-05-24 @ 10:35)  A1C with Estimated Average Glucose Result: 7.4 % (05-28-24 @ 11:45)  A1C with Estimated Average Glucose Result: 7.0 % (03-17-24 @ 06:41)

## 2024-09-07 NOTE — PROGRESS NOTE ADULT - SUBJECTIVE AND OBJECTIVE BOX
[facility] - Brittany Lutz, Zahra, Reynaldo, Román, Francesco Boss  Office Number:  [phone]    Patient resting comfortably in bed in NAD.  Laying flat with no respiratory distress.  No complaints of chest pain, dyspnea, palpitations, PND, or orthopnea.  Ventricular rates remain uncontrolled despite completing IV digoxin load.    F/U for:  AF with RVR    Telemetry:  AF with RVR    MEDICATIONS  (STANDING):  aMIOdarone    Tablet   Oral   aMIOdarone    Tablet 400 milliGRAM(s) Oral every 8 hours  ascorbic acid 500 milliGRAM(s) Oral daily  atorvastatin 40 milliGRAM(s) Oral at bedtime  aztreonam  IVPB 1000 milliGRAM(s) IV Intermittent every 6 hours  cetirizine 10 milliGRAM(s) Oral daily  chlorhexidine 2% Cloths 1 Application(s) Topical <User Schedule>  cholecalciferol 1000 Unit(s) Oral daily  dextrose 5%. 1000 milliLiter(s) (100 mL/Hr) IV Continuous <Continuous>  dextrose 5%. 1000 milliLiter(s) (50 mL/Hr) IV Continuous <Continuous>  dextrose 50% Injectable 12.5 Gram(s) IV Push once  dextrose 50% Injectable 25 Gram(s) IV Push once  dextrose 50% Injectable 25 Gram(s) IV Push once  dextrose Oral Gel 15 Gram(s) Oral once  enoxaparin Injectable 100 milliGRAM(s) SubCutaneous every 12 hours  famotidine    Tablet 20 milliGRAM(s) Oral daily  ferrous    sulfate 325 milliGRAM(s) Oral two times a day  gabapentin 100 milliGRAM(s) Oral every 12 hours  glucagon  Injectable 1 milliGRAM(s) IntraMuscular once  insulin lispro (ADMELOG) corrective regimen sliding scale   SubCutaneous three times a day before meals  insulin lispro (ADMELOG) corrective regimen sliding scale   SubCutaneous at bedtime  lactated ringers. 1000 milliLiter(s) (100 mL/Hr) IV Continuous <Continuous>  melatonin 5 milliGRAM(s) Oral at bedtime  midodrine. 15 milliGRAM(s) Oral three times a day  multivitamin 1 Tablet(s) Oral daily  naloxegol 25 milliGRAM(s) Oral daily  oxybutynin XL 10 milliGRAM(s) Oral at bedtime  oxyCODONE    IR 10 milliGRAM(s) Oral two times a day  pantoprazole    Tablet 40 milliGRAM(s) Oral before breakfast  polyethylene glycol 3350 17 Gram(s) Oral daily  senna 2 Tablet(s) Oral at bedtime  sucralfate 1 Gram(s) Oral two times a day  tiotropium 2.5 MICROgram(s)/olodaterol 2.5 MICROgram(s) (STIOLTO) Inhaler 2 Puff(s) Inhalation daily    MEDICATIONS  (PRN):  acetaminophen     Tablet .. 650 milliGRAM(s) Oral every 6 hours PRN Temp greater or equal to 38C (100.4F)  sodium chloride 0.65% Nasal 1 Spray(s) Both Nostrils daily PRN Nasal Congestion      Allergies    fish (Hives)  ertapenem (Blisters; Rash)    Intolerances        Vital Signs Last 24 Hrs  T(C): 37 (07 Sep 2024 04:50), Max: 37 (07 Sep 2024 04:50)  T(F): 98.6 (07 Sep 2024 04:50), Max: 98.6 (07 Sep 2024 04:50)  HR: 138 (07 Sep 2024 06:30) (100 - 146)  BP: 115/72 (07 Sep 2024 06:30) (83/56 - 129/75)  BP(mean): 84 (07 Sep 2024 06:30) (65 - 98)  RR: 15 (07 Sep 2024 06:30) (9 - 24)  SpO2: 98% (07 Sep 2024 06:30) (84% - 100%)        I&O's Summary    06 Sep 2024 07:01  -  07 Sep 2024 07:00  --------------------------------------------------------  IN: 3150.3 mL / OUT: 2130 mL / NET: 1020.3 mL        ON EXAM:    Constitutional: mild distress secondary to foot pain,   HEENT NC/AT, moist mucous membranes  Pulmonary: Non-labored, breath sounds are clear bilaterally, no wheezing, rales or rhonchi  Cardiovascular: irregular rhythm, tachycardic, reproducible tenderness to palpation of anterior chest wall   Gastrointestinal: Soft, nontender, nondistended, normoactive bowel sounds  Extremities: left foot amputated. right foot wrapped in bandage.   Neurological: Alert, awake, answers questions appropriately, follows commands  Psych: Mood & affect appropriate  LABS: All Labs Reviewed:                        7.3    10.96 )-----------( 470      ( 06 Sep 2024 12:14 )             23.9                         7.0    10.46 )-----------( 461      ( 06 Sep 2024 05:46 )             24.1                         8.3    19.41 )-----------( 513      ( 05 Sep 2024 10:35 )             27.3     06 Sep 2024 05:46    143    |  112    |  14     ----------------------------<  74     3.9     |  23     |  0.78   05 Sep 2024 10:25    139    |  107    |  22     ----------------------------<  73     4.3     |  23     |  1.20     Ca    7.6        06 Sep 2024 05:46  Ca    8.0        05 Sep 2024 10:25  Phos  2.9       06 Sep 2024 05:46  Mg     1.4       06 Sep 2024 05:46    TPro  4.3    /  Alb  1.2    /  TBili  0.5    /  DBili  x      /  AST  21     /  ALT  19     /  AlkPhos  144    06 Sep 2024 05:46  TPro  5.6    /  Alb  1.6    /  TBili  0.6    /  DBili  x      /  AST  23     /  ALT  26     /  AlkPhos  182    05 Sep 2024 10:25    PT/INR - ( 06 Sep 2024 05:46 )   PT: 17.6 sec;   INR: 1.56 ratio         PTT - ( 06 Sep 2024 05:46 )  PTT:36.5 sec      Blood Culture: Organism --  Gram Stain Blood -- Gram Stain --  Specimen Source Swab Swab  Culture-Blood --    Organism --  Gram Stain Blood -- Gram Stain --  Specimen Source .Blood Blood-Peripheral  Culture-Blood --    Organism --  Gram Stain Blood -- Gram Stain --  Specimen Source .Blood Blood-Peripheral  Culture-Blood --    Organism --  Gram Stain Blood -- Gram Stain   Rare polymorphonuclear leukocytes per low power field  Numerous Gram Negative Rods per oil power field  Moderate Gram Positive Cocci per oil power field  Specimen Source Tissue Other, Wound  Culture-Blood --        09-05 @ 10:25  TSH: 6.01     93

## 2024-09-07 NOTE — PHYSICAL EXAM
[de-identified] : blanchable erythema [de-identified] : 50% [Abdominal Pad] : Abdominal Pad [4 x 4] : 4 x 4  [1+] : left 1+ [Ankle Swelling (On Exam)] : present [Ankle Swelling On The Left] : moderate [] : not present [Varicose Veins Of Lower Extremities] : not present [Skin Ulcer] : ulcer [Alert] : alert [Oriented to Person] : oriented to person [Oriented to Place] : oriented to place [Calm] : calm [de-identified] : calm [de-identified] : s/p left TMA and Achilles tenotomy, pt has been non ambulatory since february  [de-identified] : left foot TMA - closed.  Right posterior heel wound of skin, subcutaneous tissue, fat, and bone with malodor, purulent draiange, periwound erythema and fibronecrotic tissue  [de-identified] : IDDM with neuropathy  [FreeTextEntry1] : heel [FreeTextEntry2] : 12.0 [FreeTextEntry3] : 12.0 [FreeTextEntry4] : 0.5 [de-identified] : purulent drainage  [de-identified] : Tissue culture obtained [de-identified] : Dakins soaked gauze [de-identified] : Mechanically cleansed with sterile gauze and normal saline 0.9%  Post debridement measurement: 12.1cmx 12.2cmx 0.6cm [FreeTextEntry7] : heel [FreeTextEntry8] : 1.0 [FreeTextEntry9] : 1.0 [de-identified] : 0.1 [de-identified] : Dry dressing [de-identified] : Mechanically cleansed with sterile gauze and normal saline 0.9% [de-identified] : scattered areas of denuded epidermis within this measurement: MD Wooten reported that he will see the patient in the hospital, the right heel wound is emergent. Photo taken. [de-identified] : Sacrum [de-identified] : 10.0 [de-identified] : 10.0 [de-identified] : 0.1 [de-identified] : serous/green [de-identified] : complicated by incontinence associated dermatitis [de-identified] : denuded epidermis [de-identified] : TRIAD [de-identified] :  Mechanically cleansed with sterile gauze and normal saline 0.9% [TWNoteComboBox1] : Right [TWNoteComboBox4] : Large [TWNoteComboBox5] : No [TWNoteComboBox6] : Pressure [de-identified] : No [de-identified] : Erythema [de-identified] : Strong [de-identified] : 100% [de-identified] : None [de-identified] : No [de-identified] : Bone [TWNoteComboBox7] : Adam [de-identified] : Debridement performed of all devitalized tissue to bleeding viable tissue [de-identified] : Daily [de-identified] : Primary Dressing [TWNoteComboBox9] : Left [de-identified] : None [de-identified] : No [de-identified] : Pressure [de-identified] : No [de-identified] : Erythema [de-identified] : None [de-identified] : None [de-identified] : None [de-identified] : Yes [de-identified] : 3x Weekly [de-identified] : Primary Dressing [de-identified] : Moderate [de-identified] : No [de-identified] : Pressure [de-identified] : No [de-identified] : Normal [de-identified] : None [de-identified] : None [de-identified] : 50% [de-identified] : Yes [de-identified] : Daily [de-identified] : Primary Dressing

## 2024-09-07 NOTE — PROGRESS NOTE ADULT - SUBJECTIVE AND OBJECTIVE BOX
PROGRESS NOTE   Patient is a 54y old  Male who presents with a chief complaint of AF (07 Sep 2024 13:41)      HPI:  53yo male bib ems from wound care with hypotension and poss gangrenous foot, being treated after having amputations, pt denies fever, chills, has no pain  In ER patient was found to be in hypotension and rapid AF.  patient is being admitted for further work up and treatment.   (05 Sep 2024 17:06)      Vital Signs Last 24 Hrs  T(C): 36.6 (07 Sep 2024 12:00), Max: 37 (07 Sep 2024 04:50)  T(F): 97.9 (07 Sep 2024 12:00), Max: 98.6 (07 Sep 2024 04:50)  HR: 112 (07 Sep 2024 13:00) (99 - 146)  BP: 130/77 (07 Sep 2024 13:00) (85/62 - 130/77)  BP(mean): 101 (07 Sep 2024 13:00) (69 - 101)  RR: 8 (07 Sep 2024 13:00) (8 - 24)  SpO2: 100% (07 Sep 2024 13:00) (84% - 100%)                              8.5    13.00 )-----------( 556      ( 07 Sep 2024 06:20 )             27.1               09-07    139  |  109<H>  |  10  ----------------------------<  93  3.9   |  22  |  0.63    Ca    7.7<L>      07 Sep 2024 06:20  Phos  2.5     09-07  Mg     1.9     09-07    TPro  4.9<L>  /  Alb  1.2<L>  /  TBili  0.5  /  DBili  x   /  AST  25  /  ALT  22  /  AlkPhos  181<H>  09-07      PHYSICAL EXAM  LE Focused:  Vasc:  DP PT pulses on-palpable b/l.                         Derm: Extensive ulceration of right posterior heel with exposed bone, severely necrotic wound bed and periwound, severe malodor, tracking circumferentially. Purulent necrotic drainage noted.                         Neuro: Diminished protective sensation b/l.                         MSK: Healed TMA noted to left foot.

## 2024-09-07 NOTE — ASSESSMENT
[Written] : Written [Verbal] : Verbal [Patient] : Patient [Caregiver] : Caregiver [Good - alert, interested, motivated] : Good - alert, interested, motivated [Verbalizes knowledge/Understanding] : Verbalizes knowledge/understanding [Dressing changes] : dressing changes [Skin Care] : skin care [Pressure relief] : pressure relief [Signs and symptoms of infection] : sign and symptoms of infection [Surgery] : surgery [Nutrition] : nutrition [How and When to Call] : how and when to call [Labs and Tests] : labs and tests [Pain Management] : pain management [Off-loading] : off-loading [Hospitalization] : hospitalization [Patient responsibility to plan of care] : patient responsibility to plan of care [Stable] : stable [Emergency Room] : Emergency Room [Stretcher] : Stretcher [Not Applicable - Long Term Care/Home Health Agency] : Long Term Care/Home Health Agency: Not Applicable [] : No [FreeTextEntry2] : Infection prevention Wound care (dressing changes) Maintain optimal skin integrity to high pressure areas Nutrition and wound healing Pressure relief/ Pressure redistribution Offloading the stress on skin structures and decreasing potential pathologic biomechanical influences. Hospitalization Sharp debridement [FreeTextEntry3] : Right heel with gas gangrene, emergent bedside debridement and transfer to ER [FreeTextEntry4] : F/U after hospitalization  Emergent authorization submitted for sharp debridement of right heel wound Patient tolerated procedure well, patient transferred to ER in stretcher report provided to RADU Rowe at [facility] ER. Tissue culture otained from right heel

## 2024-09-07 NOTE — DIETITIAN INITIAL EVALUATION ADULT - NSFNSPHYEXAMSKINFT_GEN_A_CORE
Pressure Injury 1: sacrum, Non-stageable mucosal injury  Pressure Injury 2: Left:, hip, Stage II  Pressure Injury 3: none, none  Pressure Injury 4: none, none  Pressure Injury 5: none, none  Pressure Injury 6: none, none  Pressure Injury 7: none, none  Pressure Injury 8: none, none  Pressure Injury 9: none, none  Pressure Injury 10: none, none  Pressure Injury 11: none, none, Pressure Injury 1: sacrum, Non-stageable mucosal injury  Pressure Injury 2: Left:, hip, Stage II  Pressure Injury 3: none, none  Pressure Injury 4: none, none  Pressure Injury 5: none, none  Pressure Injury 6: none, none  Pressure Injury 7: none, none  Pressure Injury 8: none, none  Pressure Injury 9: none, none  Pressure Injury 10: none, none  Pressure Injury 11: none, none Pressure Injury 1: sacrum, Non-stageable mucosal injury  Pressure Injury 2: Left:, hip, Stage II

## 2024-09-07 NOTE — VITALS
[Pain related to present condition?] : The patient's  pain is related to present condition. [Sharp] : sharp [Throbbing] : throbbing [Continuous] : continuous [] : No [de-identified] : 10/10 [FreeTextEntry3] : right heel [FreeTextEntry2] : pressure on heel and wound care [FreeTextEntry1] : offloading pressure [FreeTextEntry4] : elevation

## 2024-09-07 NOTE — DIETITIAN INITIAL EVALUATION ADULT - ADD RECOMMEND
1) Continue current diet as tolerated; honor food preferences as able to optimize po intake/tolerance  2) Continue MVI and Vit C supplementation  3) Recommend ensure max protein shake daily (150kcal, 30gm protein per 11 fl oz serving)  4) Monitor po intake, diet tolerance, weight trends, labs, GI function, skin integrity

## 2024-09-07 NOTE — REVIEW OF SYSTEMS
[FreeTextEntry1] : 6113 [Fever] : no fever [Chills] : no chills [Eye Pain] : no eye pain [Earache] : no earache [Loss Of Hearing] : no hearing loss [Chest Pain] : no chest pain [Shortness Of Breath] : no shortness of breath [Abdominal Pain] : no abdominal pain [Skin Wound] : skin wound [Limb Weakness] : limb weakness [Anxiety] : no anxiety [Easy Bleeding] : a tendency for easy bleeding [FreeTextEntry5] : htn, hld diabetic cardio vascular disease  [FreeTextEntry9] : s/p left foot TMA w/Achilles tenotomy [de-identified] : left foot DFU3, s/p TMA-  healed, right heel wound down to bone  [de-identified] : diabetic neuropathy [de-identified] : IDDM with neuropathy

## 2024-09-07 NOTE — PHYSICAL EXAM
[de-identified] : blanchable erythema [de-identified] : 50% [Abdominal Pad] : Abdominal Pad [4 x 4] : 4 x 4  [1+] : left 1+ [Ankle Swelling (On Exam)] : present [Ankle Swelling On The Left] : moderate [Varicose Veins Of Lower Extremities] : not present [] : not present [Skin Ulcer] : ulcer [Alert] : alert [Oriented to Person] : oriented to person [Oriented to Place] : oriented to place [Calm] : calm [de-identified] : calm [de-identified] : s/p left TMA and Achilles tenotomy, pt has been non ambulatory since february  [de-identified] : left foot TMA - closed.  Right posterior heel wound of skin, subcutaneous tissue, fat, and bone with malodor, purulent draiange, periwound erythema and fibronecrotic tissue  [de-identified] : IDDM with neuropathy  [FreeTextEntry2] : 12.0 [FreeTextEntry1] : heel [FreeTextEntry3] : 12.0 [FreeTextEntry4] : 0.5 [de-identified] : purulent drainage  [de-identified] : Tissue culture obtained [de-identified] : Dakins soaked gauze [de-identified] : Mechanically cleansed with sterile gauze and normal saline 0.9%  Post debridement measurement: 12.1cmx 12.2cmx 0.6cm [FreeTextEntry7] : heel [FreeTextEntry8] : 1.0 [FreeTextEntry9] : 1.0 [de-identified] : 0.1 [de-identified] : Dry dressing [de-identified] : Mechanically cleansed with sterile gauze and normal saline 0.9% [de-identified] : scattered areas of denuded epidermis within this measurement: MD Wooten reported that he will see the patient in the hospital, the right heel wound is emergent. Photo taken. [de-identified] : Sacrum [de-identified] : 10.0 [de-identified] : 10.0 [de-identified] : 0.1 [de-identified] : serous/green [de-identified] : complicated by incontinence associated dermatitis [de-identified] : denuded epidermis [de-identified] : TRIAD [de-identified] :  Mechanically cleansed with sterile gauze and normal saline 0.9% [TWNoteComboBox1] : Right [TWNoteComboBox4] : Large [TWNoteComboBox5] : No [TWNoteComboBox6] : Pressure [de-identified] : No [de-identified] : Erythema [de-identified] : Strong [de-identified] : 100% [de-identified] : None [de-identified] : No [de-identified] : Bone [TWNoteComboBox7] : Adam [de-identified] : Debridement performed of all devitalized tissue to bleeding viable tissue [de-identified] : Daily [de-identified] : Primary Dressing [TWNoteComboBox9] : Left [de-identified] : None [de-identified] : No [de-identified] : Pressure [de-identified] : No [de-identified] : Erythema [de-identified] : None [de-identified] : None [de-identified] : None [de-identified] : Yes [de-identified] : 3x Weekly [de-identified] : Primary Dressing [de-identified] : Moderate [de-identified] : No [de-identified] : Pressure [de-identified] : No [de-identified] : Normal [de-identified] : None [de-identified] : None [de-identified] : 50% [de-identified] : Yes [de-identified] : Daily [de-identified] : Primary Dressing

## 2024-09-07 NOTE — VITALS
[Pain related to present condition?] : The patient's  pain is related to present condition. [Sharp] : sharp [Throbbing] : throbbing [Continuous] : continuous [] : No [de-identified] : 10/10 [FreeTextEntry3] : right heel [FreeTextEntry1] : offloading pressure [FreeTextEntry2] : pressure on heel and wound care [FreeTextEntry4] : elevation

## 2024-09-07 NOTE — REVIEW OF SYSTEMS
[FreeTextEntry1] : 0711 [Fever] : no fever [Chills] : no chills [Eye Pain] : no eye pain [Earache] : no earache [Loss Of Hearing] : no hearing loss [Chest Pain] : no chest pain [Shortness Of Breath] : no shortness of breath [Abdominal Pain] : no abdominal pain [Skin Wound] : skin wound [Limb Weakness] : limb weakness [Anxiety] : no anxiety [Easy Bleeding] : a tendency for easy bleeding [FreeTextEntry5] : htn, hld diabetic cardio vascular disease  [FreeTextEntry9] : s/p left foot TMA w/Achilles tenotomy [de-identified] : left foot DFU3, s/p TMA-  healed, right heel wound down to bone  [de-identified] : diabetic neuropathy [de-identified] : IDDM with neuropathy

## 2024-09-07 NOTE — ASSESSMENT
[Written] : Written [Verbal] : Verbal [Patient] : Patient [Caregiver] : Caregiver [Good - alert, interested, motivated] : Good - alert, interested, motivated [Verbalizes knowledge/Understanding] : Verbalizes knowledge/understanding [Dressing changes] : dressing changes [Skin Care] : skin care [Pressure relief] : pressure relief [Signs and symptoms of infection] : sign and symptoms of infection [Surgery] : surgery [Nutrition] : nutrition [How and When to Call] : how and when to call [Labs and Tests] : labs and tests [Pain Management] : pain management [Off-loading] : off-loading [Hospitalization] : hospitalization [Patient responsibility to plan of care] : patient responsibility to plan of care [Stable] : stable [Emergency Room] : Emergency Room [Stretcher] : Stretcher [Not Applicable - Long Term Care/Home Health Agency] : Long Term Care/Home Health Agency: Not Applicable [] : No [FreeTextEntry3] : Right heel with gas gangrene, emergent bedside debridement and transfer to ER [FreeTextEntry2] : Infection prevention Wound care (dressing changes) Maintain optimal skin integrity to high pressure areas Nutrition and wound healing Pressure relief/ Pressure redistribution Offloading the stress on skin structures and decreasing potential pathologic biomechanical influences. Hospitalization Sharp debridement [FreeTextEntry4] : F/U after hospitalization  Emergent authorization submitted for sharp debridement of right heel wound Patient tolerated procedure well, patient transferred to ER in stretcher report provided to [facility]U Rowe at [facility] ER. Tissue culture otained from right heel

## 2024-09-08 DIAGNOSIS — M86.671 OTHER CHRONIC OSTEOMYELITIS, RIGHT ANKLE AND FOOT: ICD-10-CM

## 2024-09-08 DIAGNOSIS — Z87.440 PERSONAL HISTORY OF URINARY (TRACT) INFECTIONS: ICD-10-CM

## 2024-09-08 DIAGNOSIS — Z89.432 ACQUIRED ABSENCE OF LEFT FOOT: ICD-10-CM

## 2024-09-08 DIAGNOSIS — Z79.84 LONG TERM (CURRENT) USE OF ORAL HYPOGLYCEMIC DRUGS: ICD-10-CM

## 2024-09-08 DIAGNOSIS — E11.69 TYPE 2 DIABETES MELLITUS WITH OTHER SPECIFIED COMPLICATION: ICD-10-CM

## 2024-09-08 DIAGNOSIS — Z86.73 PERSONAL HISTORY OF TRANSIENT ISCHEMIC ATTACK (TIA), AND CEREBRAL INFARCTION WITHOUT RESIDUAL DEFICITS: ICD-10-CM

## 2024-09-08 DIAGNOSIS — L97.414 NON-PRESSURE CHRONIC ULCER OF RIGHT HEEL AND MIDFOOT WITH NECROSIS OF BONE: ICD-10-CM

## 2024-09-08 DIAGNOSIS — E56.9 VITAMIN DEFICIENCY, UNSPECIFIED: ICD-10-CM

## 2024-09-08 DIAGNOSIS — Z79.01 LONG TERM (CURRENT) USE OF ANTICOAGULANTS: ICD-10-CM

## 2024-09-08 DIAGNOSIS — E11.40 TYPE 2 DIABETES MELLITUS WITH DIABETIC NEUROPATHY, UNSPECIFIED: ICD-10-CM

## 2024-09-08 DIAGNOSIS — K59.00 CONSTIPATION, UNSPECIFIED: ICD-10-CM

## 2024-09-08 DIAGNOSIS — E11.59 TYPE 2 DIABETES MELLITUS WITH OTHER CIRCULATORY COMPLICATIONS: ICD-10-CM

## 2024-09-08 DIAGNOSIS — Z87.891 PERSONAL HISTORY OF NICOTINE DEPENDENCE: ICD-10-CM

## 2024-09-08 DIAGNOSIS — D64.9 ANEMIA, UNSPECIFIED: ICD-10-CM

## 2024-09-08 DIAGNOSIS — I25.2 OLD MYOCARDIAL INFARCTION: ICD-10-CM

## 2024-09-08 DIAGNOSIS — E87.6 HYPOKALEMIA: ICD-10-CM

## 2024-09-08 DIAGNOSIS — Z79.899 OTHER LONG TERM (CURRENT) DRUG THERAPY: ICD-10-CM

## 2024-09-08 LAB
-  AMOXICILLIN/CLAVULANIC ACID: SIGNIFICANT CHANGE UP
-  AMOXICILLIN/CLAVULANIC ACID: SIGNIFICANT CHANGE UP
-  AMPICILLIN/SULBACTAM: SIGNIFICANT CHANGE UP
-  AMPICILLIN/SULBACTAM: SIGNIFICANT CHANGE UP
-  AMPICILLIN: SIGNIFICANT CHANGE UP
-  AMPICILLIN: SIGNIFICANT CHANGE UP
-  AZTREONAM: SIGNIFICANT CHANGE UP
-  AZTREONAM: SIGNIFICANT CHANGE UP
-  CEFAZOLIN: SIGNIFICANT CHANGE UP
-  CEFAZOLIN: SIGNIFICANT CHANGE UP
-  CEFEPIME: SIGNIFICANT CHANGE UP
-  CEFEPIME: SIGNIFICANT CHANGE UP
-  CEFOXITIN: SIGNIFICANT CHANGE UP
-  CEFOXITIN: SIGNIFICANT CHANGE UP
-  CEFTRIAXONE: SIGNIFICANT CHANGE UP
-  CEFTRIAXONE: SIGNIFICANT CHANGE UP
-  CIPROFLOXACIN: SIGNIFICANT CHANGE UP
-  CIPROFLOXACIN: SIGNIFICANT CHANGE UP
-  ERTAPENEM: SIGNIFICANT CHANGE UP
-  ERTAPENEM: SIGNIFICANT CHANGE UP
-  GENTAMICIN: SIGNIFICANT CHANGE UP
-  GENTAMICIN: SIGNIFICANT CHANGE UP
-  IMIPENEM: SIGNIFICANT CHANGE UP
-  IMIPENEM: SIGNIFICANT CHANGE UP
-  LEVOFLOXACIN: SIGNIFICANT CHANGE UP
-  LEVOFLOXACIN: SIGNIFICANT CHANGE UP
-  MEROPENEM: SIGNIFICANT CHANGE UP
-  MEROPENEM: SIGNIFICANT CHANGE UP
-  PIPERACILLIN/TAZOBACTAM: SIGNIFICANT CHANGE UP
-  PIPERACILLIN/TAZOBACTAM: SIGNIFICANT CHANGE UP
-  TOBRAMYCIN: SIGNIFICANT CHANGE UP
-  TOBRAMYCIN: SIGNIFICANT CHANGE UP
-  TRIMETHOPRIM/SULFAMETHOXAZOLE: SIGNIFICANT CHANGE UP
-  TRIMETHOPRIM/SULFAMETHOXAZOLE: SIGNIFICANT CHANGE UP
ALBUMIN SERPL ELPH-MCNC: 1.3 G/DL — LOW (ref 3.3–5)
ALP SERPL-CCNC: 179 U/L — HIGH (ref 40–120)
ALT FLD-CCNC: 21 U/L — SIGNIFICANT CHANGE UP (ref 12–78)
ANION GAP SERPL CALC-SCNC: 7 MMOL/L — SIGNIFICANT CHANGE UP (ref 5–17)
AST SERPL-CCNC: 24 U/L — SIGNIFICANT CHANGE UP (ref 15–37)
BASOPHILS # BLD AUTO: 0.05 K/UL — SIGNIFICANT CHANGE UP (ref 0–0.2)
BASOPHILS NFR BLD AUTO: 0.4 % — SIGNIFICANT CHANGE UP (ref 0–2)
BILIRUB SERPL-MCNC: 0.5 MG/DL — SIGNIFICANT CHANGE UP (ref 0.2–1.2)
BUN SERPL-MCNC: 9 MG/DL — SIGNIFICANT CHANGE UP (ref 7–23)
CALCIUM SERPL-MCNC: 7.7 MG/DL — LOW (ref 8.5–10.1)
CHLORIDE SERPL-SCNC: 110 MMOL/L — HIGH (ref 96–108)
CO2 SERPL-SCNC: 22 MMOL/L — SIGNIFICANT CHANGE UP (ref 22–31)
CREAT SERPL-MCNC: 0.67 MG/DL — SIGNIFICANT CHANGE UP (ref 0.5–1.3)
CULTURE RESULTS: ABNORMAL
DIGOXIN SERPL-MCNC: 1.1 NG/ML — SIGNIFICANT CHANGE UP (ref 0.8–2)
EGFR: 110 ML/MIN/1.73M2 — SIGNIFICANT CHANGE UP
EOSINOPHIL # BLD AUTO: 0.29 K/UL — SIGNIFICANT CHANGE UP (ref 0–0.5)
EOSINOPHIL NFR BLD AUTO: 2.5 % — SIGNIFICANT CHANGE UP (ref 0–6)
GLUCOSE BLDC GLUCOMTR-MCNC: 107 MG/DL — HIGH (ref 70–99)
GLUCOSE BLDC GLUCOMTR-MCNC: 115 MG/DL — HIGH (ref 70–99)
GLUCOSE BLDC GLUCOMTR-MCNC: 119 MG/DL — HIGH (ref 70–99)
GLUCOSE BLDC GLUCOMTR-MCNC: 84 MG/DL — SIGNIFICANT CHANGE UP (ref 70–99)
GLUCOSE SERPL-MCNC: 85 MG/DL — SIGNIFICANT CHANGE UP (ref 70–99)
HCT VFR BLD CALC: 30.2 % — LOW (ref 39–50)
HGB BLD-MCNC: 9 G/DL — LOW (ref 13–17)
IMM GRANULOCYTES NFR BLD AUTO: 1.6 % — HIGH (ref 0–0.9)
LYMPHOCYTES # BLD AUTO: 1.69 K/UL — SIGNIFICANT CHANGE UP (ref 1–3.3)
LYMPHOCYTES # BLD AUTO: 14.4 % — SIGNIFICANT CHANGE UP (ref 13–44)
MAGNESIUM SERPL-MCNC: 1 MG/DL — CRITICAL LOW (ref 1.6–2.6)
MAGNESIUM SERPL-MCNC: 2.5 MG/DL — SIGNIFICANT CHANGE UP (ref 1.6–2.6)
MCHC RBC-ENTMCNC: 28 PG — SIGNIFICANT CHANGE UP (ref 27–34)
MCHC RBC-ENTMCNC: 29.8 GM/DL — LOW (ref 32–36)
MCV RBC AUTO: 94.1 FL — SIGNIFICANT CHANGE UP (ref 80–100)
METHOD TYPE: SIGNIFICANT CHANGE UP
METHOD TYPE: SIGNIFICANT CHANGE UP
MONOCYTES # BLD AUTO: 1.04 K/UL — HIGH (ref 0–0.9)
MONOCYTES NFR BLD AUTO: 8.9 % — SIGNIFICANT CHANGE UP (ref 2–14)
NEUTROPHILS # BLD AUTO: 8.44 K/UL — HIGH (ref 1.8–7.4)
NEUTROPHILS NFR BLD AUTO: 72.2 % — SIGNIFICANT CHANGE UP (ref 43–77)
NRBC # BLD: 0 /100 WBCS — SIGNIFICANT CHANGE UP (ref 0–0)
ORGANISM # SPEC MICROSCOPIC CNT: ABNORMAL
ORGANISM # SPEC MICROSCOPIC CNT: SIGNIFICANT CHANGE UP
PHOSPHATE SERPL-MCNC: 2.6 MG/DL — SIGNIFICANT CHANGE UP (ref 2.5–4.5)
PLATELET # BLD AUTO: 615 K/UL — HIGH (ref 150–400)
POTASSIUM SERPL-MCNC: 3.9 MMOL/L — SIGNIFICANT CHANGE UP (ref 3.5–5.3)
POTASSIUM SERPL-SCNC: 3.9 MMOL/L — SIGNIFICANT CHANGE UP (ref 3.5–5.3)
PROT SERPL-MCNC: 5.3 G/DL — LOW (ref 6–8.3)
RBC # BLD: 3.21 M/UL — LOW (ref 4.2–5.8)
RBC # FLD: 16.6 % — HIGH (ref 10.3–14.5)
SODIUM SERPL-SCNC: 139 MMOL/L — SIGNIFICANT CHANGE UP (ref 135–145)
SPECIMEN SOURCE: SIGNIFICANT CHANGE UP
WBC # BLD: 11.7 K/UL — HIGH (ref 3.8–10.5)
WBC # FLD AUTO: 11.7 K/UL — HIGH (ref 3.8–10.5)

## 2024-09-08 PROCEDURE — 99233 SBSQ HOSP IP/OBS HIGH 50: CPT

## 2024-09-08 PROCEDURE — 99233 SBSQ HOSP IP/OBS HIGH 50: CPT | Mod: GC

## 2024-09-08 PROCEDURE — 99232 SBSQ HOSP IP/OBS MODERATE 35: CPT | Mod: 57

## 2024-09-08 RX ORDER — POTASSIUM PHOSPHATE 236; 224 MG/ML; MG/ML
30 INJECTION, SOLUTION INTRAVENOUS ONCE
Refills: 0 | Status: COMPLETED | OUTPATIENT
Start: 2024-09-08 | End: 2024-09-08

## 2024-09-08 RX ORDER — DIGOXIN 0.12 MG/1
125 TABLET ORAL DAILY
Refills: 0 | Status: DISCONTINUED | OUTPATIENT
Start: 2024-09-08 | End: 2024-09-10

## 2024-09-08 RX ORDER — METOPROLOL TARTRATE 100 MG/1
12.5 TABLET ORAL EVERY 6 HOURS
Refills: 0 | Status: DISCONTINUED | OUTPATIENT
Start: 2024-09-08 | End: 2024-09-10

## 2024-09-08 RX ADMIN — Medication 40 MILLIGRAM(S): at 08:31

## 2024-09-08 RX ADMIN — Medication 325 MILLIGRAM(S): at 05:19

## 2024-09-08 RX ADMIN — NALOXEGOL OXALATE 25 MILLIGRAM(S): 25 TABLET, FILM COATED ORAL at 11:56

## 2024-09-08 RX ADMIN — OXYCODONE HYDROCHLORIDE 400 MILLIGRAM(S): 15 TABLET ORAL at 14:51

## 2024-09-08 RX ADMIN — Medication 100 MILLIGRAM(S): at 05:18

## 2024-09-08 RX ADMIN — METOPROLOL TARTRATE 12.5 MILLIGRAM(S): 100 TABLET ORAL at 17:08

## 2024-09-08 RX ADMIN — Medication 50 MILLIGRAM(S): at 17:08

## 2024-09-08 RX ADMIN — ENOXAPARIN SODIUM 100 MILLIGRAM(S): 100 INJECTION SUBCUTANEOUS at 05:20

## 2024-09-08 RX ADMIN — Medication 5 MILLIGRAM(S): at 21:50

## 2024-09-08 RX ADMIN — OXYCODONE HYDROCHLORIDE 10 MILLIGRAM(S): 5 TABLET ORAL at 05:19

## 2024-09-08 RX ADMIN — CHLORHEXIDINE GLUCONATE 1 APPLICATION(S): 40 SOLUTION TOPICAL at 05:17

## 2024-09-08 RX ADMIN — FAMOTIDINE 20 MILLIGRAM(S): 10 INJECTION INTRAVENOUS at 11:57

## 2024-09-08 RX ADMIN — METOPROLOL TARTRATE 12.5 MILLIGRAM(S): 100 TABLET ORAL at 11:59

## 2024-09-08 RX ADMIN — Medication 1 TABLET(S): at 11:57

## 2024-09-08 RX ADMIN — OXYCODONE HYDROCHLORIDE 10 MILLIGRAM(S): 5 TABLET ORAL at 18:20

## 2024-09-08 RX ADMIN — Medication 100 MILLIGRAM(S): at 17:07

## 2024-09-08 RX ADMIN — ENOXAPARIN SODIUM 100 MILLIGRAM(S): 100 INJECTION SUBCUTANEOUS at 17:08

## 2024-09-08 RX ADMIN — OXYCODONE HYDROCHLORIDE 400 MILLIGRAM(S): 15 TABLET ORAL at 21:50

## 2024-09-08 RX ADMIN — Medication 1 APPLICATION(S): at 05:18

## 2024-09-08 RX ADMIN — OXYCODONE HYDROCHLORIDE 10 MILLIGRAM(S): 5 TABLET ORAL at 17:08

## 2024-09-08 RX ADMIN — Medication 1 APPLICATION(S): at 17:09

## 2024-09-08 RX ADMIN — Medication 325 MILLIGRAM(S): at 17:07

## 2024-09-08 RX ADMIN — METOPROLOL TARTRATE 12.5 MILLIGRAM(S): 100 TABLET ORAL at 05:52

## 2024-09-08 RX ADMIN — OXYBUTYNIN CHLORIDE 10 MILLIGRAM(S): 5 TABLET ORAL at 21:50

## 2024-09-08 RX ADMIN — Medication 10 MILLIGRAM(S): at 11:56

## 2024-09-08 RX ADMIN — OXYCODONE HYDROCHLORIDE 400 MILLIGRAM(S): 15 TABLET ORAL at 05:19

## 2024-09-08 RX ADMIN — POLYETHYLENE GLYCOL 3350 17 GRAM(S): 17 POWDER, FOR SOLUTION ORAL at 11:56

## 2024-09-08 RX ADMIN — Medication 1000 UNIT(S): at 11:57

## 2024-09-08 RX ADMIN — Medication 50 MILLIGRAM(S): at 05:52

## 2024-09-08 RX ADMIN — Medication 500 MILLIGRAM(S): at 11:57

## 2024-09-08 RX ADMIN — SUCRALFATE 1 GRAM(S): 1 SUSPENSION ORAL at 17:08

## 2024-09-08 RX ADMIN — Medication 25 GRAM(S): at 10:45

## 2024-09-08 RX ADMIN — POTASSIUM PHOSPHATE 83.33 MILLIMOLE(S): 236; 224 INJECTION, SOLUTION INTRAVENOUS at 14:51

## 2024-09-08 RX ADMIN — Medication 40 MILLIGRAM(S): at 21:50

## 2024-09-08 RX ADMIN — MIDODRINE HYDROCHLORIDE 15 MILLIGRAM(S): 5 TABLET ORAL at 11:56

## 2024-09-08 RX ADMIN — Medication 50 MILLIGRAM(S): at 00:06

## 2024-09-08 RX ADMIN — MIDODRINE HYDROCHLORIDE 15 MILLIGRAM(S): 5 TABLET ORAL at 05:19

## 2024-09-08 RX ADMIN — MIDODRINE HYDROCHLORIDE 15 MILLIGRAM(S): 5 TABLET ORAL at 20:11

## 2024-09-08 RX ADMIN — DIGOXIN 125 MICROGRAM(S): 0.12 TABLET ORAL at 14:51

## 2024-09-08 RX ADMIN — Medication 50 MILLIGRAM(S): at 11:58

## 2024-09-08 RX ADMIN — Medication 25 GRAM(S): at 08:31

## 2024-09-08 RX ADMIN — SUCRALFATE 1 GRAM(S): 1 SUSPENSION ORAL at 05:19

## 2024-09-08 RX ADMIN — METOPROLOL TARTRATE 12.5 MILLIGRAM(S): 100 TABLET ORAL at 00:06

## 2024-09-08 NOTE — PROGRESS NOTE ADULT - SUBJECTIVE AND OBJECTIVE BOX
[facility]       HPI:  53yo male bib ems from wound care with hypotension and poss gangrenous foot, being treated after having amputations, pt denies fever, chills, has no pain  In ER patient was found to be in hypotension and rapid AF.  patient is being admitted for further work up and treatment.   (05 Sep 2024 17:06)        SUBJECTIVE:      ALLERGIES:  Allergies    fish (Hives)  ertapenem (Blisters; Rash)    Intolerances          MEDICATIONS  (STANDING):  aMIOdarone    Tablet 400 milliGRAM(s) Oral every 8 hours  aMIOdarone    Tablet   Oral   ascorbic acid 500 milliGRAM(s) Oral daily  atorvastatin 40 milliGRAM(s) Oral at bedtime  aztreonam  IVPB 1000 milliGRAM(s) IV Intermittent every 6 hours  cetirizine 10 milliGRAM(s) Oral daily  chlorhexidine 2% Cloths 1 Application(s) Topical <User Schedule>  cholecalciferol 1000 Unit(s) Oral daily  dextrose 5%. 1000 milliLiter(s) (100 mL/Hr) IV Continuous <Continuous>  dextrose 5%. 1000 milliLiter(s) (50 mL/Hr) IV Continuous <Continuous>  dextrose 50% Injectable 12.5 Gram(s) IV Push once  dextrose 50% Injectable 25 Gram(s) IV Push once  dextrose 50% Injectable 25 Gram(s) IV Push once  dextrose Oral Gel 15 Gram(s) Oral once  digoxin     Tablet 125 MICROGram(s) Oral daily  enoxaparin Injectable 100 milliGRAM(s) SubCutaneous every 12 hours  famotidine    Tablet 20 milliGRAM(s) Oral daily  ferrous    sulfate 325 milliGRAM(s) Oral two times a day  gabapentin 100 milliGRAM(s) Oral every 12 hours  glucagon  Injectable 1 milliGRAM(s) IntraMuscular once  insulin lispro (ADMELOG) corrective regimen sliding scale   SubCutaneous at bedtime  insulin lispro (ADMELOG) corrective regimen sliding scale   SubCutaneous three times a day before meals  melatonin 5 milliGRAM(s) Oral at bedtime  metoprolol tartrate 12.5 milliGRAM(s) Oral every 6 hours  midodrine. 15 milliGRAM(s) Oral three times a day  multivitamin 1 Tablet(s) Oral daily  mupirocin 2% Nasal 1 Application(s) Both Nostrils two times a day  naloxegol 25 milliGRAM(s) Oral daily  oxybutynin XL 10 milliGRAM(s) Oral at bedtime  oxyCODONE    IR 10 milliGRAM(s) Oral two times a day  pantoprazole    Tablet 40 milliGRAM(s) Oral before breakfast  polyethylene glycol 3350 17 Gram(s) Oral daily  senna 2 Tablet(s) Oral at bedtime  sucralfate 1 Gram(s) Oral two times a day  tiotropium 2.5 MICROgram(s)/olodaterol 2.5 MICROgram(s) (STIOLTO) Inhaler 2 Puff(s) Inhalation daily    MEDICATIONS  (PRN):  acetaminophen     Tablet .. 650 milliGRAM(s) Oral every 6 hours PRN Temp greater or equal to 38C (100.4F)  HYDROmorphone  Injectable 1 milliGRAM(s) IV Push daily PRN Severe Pain (7 - 10)  sodium chloride 0.65% Nasal 1 Spray(s) Both Nostrils daily PRN Nasal Congestion      REVIEW OF SYSTEMS:  CONSTITUTIONAL: No fever,  RESPIRATORY: No cough, wheezing, shortness of breath  CARDIOVASCULAR: No chest pain, dyspnea, palpitations, dizziness, syncope, paroxysmal nocturnal dyspnea, orthopnea, or arm or leg swelling  GASTROINTESTINAL: No abdominal  or epigastric pain, nausea, vomiting,  diarrhea  NEUROLOGICAL: No headaches,  loss of strength, numbness, or tremors    Vital Signs Last 24 Hrs  T(C): 36.5 (08 Sep 2024 20:30), Max: 37 (08 Sep 2024 07:00)  T(F): 97.7 (08 Sep 2024 20:30), Max: 98.6 (08 Sep 2024 07:00)  HR: 115 (08 Sep 2024 22:00) (90 - 152)  BP: 123/79 (08 Sep 2024 22:00) (86/55 - 134/68)  BP(mean): 97 (08 Sep 2024 22:00) (65 - 98)  RR: 14 (08 Sep 2024 22:00) (11 - 19)  SpO2: 100% (08 Sep 2024 22:00) (93% - 100%)    Parameters below as of 08 Sep 2024 22:00  Patient On (Oxygen Delivery Method): room air        PHYSICAL EXAM:  HEAD:  Atraumatic, Normocephalic  NECK: Supple and normal thyroid.  No JVD or carotid bruit.   HEART: S1, S2 regular , 1/6 soft ejection systolic murmur in mitral area , no thrill and no gallops .  PULMONARY: Bilateral vesicular breathing , few scattered ronchi and few scattered rales are present .  ABDOMEN: Soft nontender and positive bowl sounds   EXTREMITIES:  No clubbing, cyanosis, or pedal  edema  NEUROLOGICAL: AAOX3 , no focal deficit .    LABS:                        9.0    11.70 )-----------( 615      ( 08 Sep 2024 06:15 )             30.2     09-08    139  |  110<H>  |  9   ----------------------------<  85  3.9   |  22  |  0.67    Ca    7.7<L>      08 Sep 2024 06:15  Phos  2.6     09-08  Mg     2.5     09-08    TPro  5.3<L>  /  Alb  1.3<L>  /  TBili  0.5  /  DBili  x   /  AST  24  /  ALT  21  /  AlkPhos  179<H>  09-08          Urinalysis Basic - ( 08 Sep 2024 06:15 )    Color: x / Appearance: x / SG: x / pH: x  Gluc: 85 mg/dL / Ketone: x  / Bili: x / Urobili: x   Blood: x / Protein: x / Nitrite: x   Leuk Esterase: x / RBC: x / WBC x   Sq Epi: x / Non Sq Epi: x / Bacteria: x      BNP      EKG:  ECHO:  IMAGING:    Assessment/Plan    Will continue to follow during hospital course and give further recommendations as needed . Thanks for your referral . if any questions please contact me at office [phone] [phone])  YAJAIRA EMERSON MD [place] [id number]  [address]  OFFICE : [phone]  CELL : [phone]  MEDICAL F/U :       HPI:  55yo male bib ems from wound care with hypotension and poss gangrenous foot, being treated after having amputations, pt denies fever, chills, has no pain  In ER patient was found to be in hypotension and rapid AF.  patient is being admitted for further work up and treatment.   (05 Sep 2024 17:06)        SUBJECTIVE: Patient feeling better .      ALLERGIES:  Allergies    fish (Hives)  ertapenem (Blisters; Rash)    Intolerances          MEDICATIONS  (STANDING):  aMIOdarone    Tablet 400 milliGRAM(s) Oral every 8 hours  aMIOdarone    Tablet   Oral   ascorbic acid 500 milliGRAM(s) Oral daily  atorvastatin 40 milliGRAM(s) Oral at bedtime  aztreonam  IVPB 1000 milliGRAM(s) IV Intermittent every 6 hours  cetirizine 10 milliGRAM(s) Oral daily  chlorhexidine 2% Cloths 1 Application(s) Topical <User Schedule>  cholecalciferol 1000 Unit(s) Oral daily  dextrose 5%. 1000 milliLiter(s) (100 mL/Hr) IV Continuous <Continuous>  dextrose 5%. 1000 milliLiter(s) (50 mL/Hr) IV Continuous <Continuous>  dextrose 50% Injectable 12.5 Gram(s) IV Push once  dextrose 50% Injectable 25 Gram(s) IV Push once  dextrose 50% Injectable 25 Gram(s) IV Push once  dextrose Oral Gel 15 Gram(s) Oral once  digoxin     Tablet 125 MICROGram(s) Oral daily  enoxaparin Injectable 100 milliGRAM(s) SubCutaneous every 12 hours  famotidine    Tablet 20 milliGRAM(s) Oral daily  ferrous    sulfate 325 milliGRAM(s) Oral two times a day  gabapentin 100 milliGRAM(s) Oral every 12 hours  glucagon  Injectable 1 milliGRAM(s) IntraMuscular once  insulin lispro (ADMELOG) corrective regimen sliding scale   SubCutaneous at bedtime  insulin lispro (ADMELOG) corrective regimen sliding scale   SubCutaneous three times a day before meals  melatonin 5 milliGRAM(s) Oral at bedtime  metoprolol tartrate 12.5 milliGRAM(s) Oral every 6 hours  midodrine. 15 milliGRAM(s) Oral three times a day  multivitamin 1 Tablet(s) Oral daily  mupirocin 2% Nasal 1 Application(s) Both Nostrils two times a day  naloxegol 25 milliGRAM(s) Oral daily  oxybutynin XL 10 milliGRAM(s) Oral at bedtime  oxyCODONE    IR 10 milliGRAM(s) Oral two times a day  pantoprazole    Tablet 40 milliGRAM(s) Oral before breakfast  polyethylene glycol 3350 17 Gram(s) Oral daily  senna 2 Tablet(s) Oral at bedtime  sucralfate 1 Gram(s) Oral two times a day  tiotropium 2.5 MICROgram(s)/olodaterol 2.5 MICROgram(s) (STIOLTO) Inhaler 2 Puff(s) Inhalation daily    MEDICATIONS  (PRN):  acetaminophen     Tablet .. 650 milliGRAM(s) Oral every 6 hours PRN Temp greater or equal to 38C (100.4F)  HYDROmorphone  Injectable 1 milliGRAM(s) IV Push daily PRN Severe Pain (7 - 10)  sodium chloride 0.65% Nasal 1 Spray(s) Both Nostrils daily PRN Nasal Congestion      REVIEW OF SYSTEMS:  CONSTITUTIONAL: No fever,  RESPIRATORY: No cough, wheezing, shortness of breath  CARDIOVASCULAR: No chest pain, dyspnea, palpitations, dizziness, syncope, paroxysmal nocturnal dyspnea, orthopnea, or arm or leg swelling  GASTROINTESTINAL: No abdominal  or epigastric pain, nausea, vomiting,  diarrhea  NEUROLOGICAL: No headaches,  numbness, or tremors  Skin : No itching .  Hematology : No active bleeding .  Endocrinology : No heat and cold intolerance .  Psychiatry : patient is calm .  Genitourinary : No dysuria .  Musculoskeletal : Patient has mild arthritis .     Vital Signs Last 24 Hrs  T(C): 36.5 (08 Sep 2024 20:30), Max: 37 (08 Sep 2024 07:00)  T(F): 97.7 (08 Sep 2024 20:30), Max: 98.6 (08 Sep 2024 07:00)  HR: 115 (08 Sep 2024 22:00) (90 - 152)  BP: 123/79 (08 Sep 2024 22:00) (86/55 - 134/68)  BP(mean): 97 (08 Sep 2024 22:00) (65 - 98)  RR: 14 (08 Sep 2024 22:00) (11 - 19)  SpO2: 100% (08 Sep 2024 22:00) (93% - 100%)    Parameters below as of 08 Sep 2024 22:00  Patient On (Oxygen Delivery Method): room air        PHYSICAL EXAM:  HEAD:  Atraumatic, Normocephalic  NECK: Supple and normal thyroid.  No JVD or carotid bruit.   HEART: S1, S2 irregular , 1/6 soft ejection systolic murmur in mitral area , no thrill and no gallops .  PULMONARY: Bilateral vesicular breathing , few scattered rhonchi and few scattered rales are present .  ABDOMEN: Soft nontender and positive bowl sounds   EXTREMITIES:  No clubbing, cyanosis, or pedal  edema . Right heal wound present .  NEUROLOGICAL: AAOX3 , no focal deficit .  Skin : No rashes .  Musculoskeletal : No joint swellings .    LABS:                        9.0    11.70 )-----------( 615      ( 08 Sep 2024 06:15 )             30.2     09-08    139  |  110<H>  |  9   ----------------------------<  85  3.9   |  22  |  0.67    Ca    7.7<L>      08 Sep 2024 06:15  Phos  2.6     09-08  Mg     2.5     09-08    TPro  5.3<L>  /  Alb  1.3<L>  /  TBili  0.5  /  DBili  x   /  AST  24  /  ALT  21  /  AlkPhos  179<H>  09-08          Urinalysis Basic - ( 08 Sep 2024 06:15 )    Color: x / Appearance: x / SG: x / pH: x  Gluc: 85 mg/dL / Ketone: x  / Bili: x / Urobili: x   Blood: x / Protein: x / Nitrite: x   Leuk Esterase: x / RBC: x / WBC x   Sq Epi: x / Non Sq Epi: x / Bacteria: x      Assessment/Plan  Patient has :  1) Diabetic foot and right heal wound present and in dressing .  2) Atrial fibrillation and intermittently mild fast ventricular rate ,   3) Chronic anemia   Plan : 1) Patient medically stable and cleared for right lower extremity surgery . Benefits of surgery outweigh the risks 2) Will monitor hemoglobin nd electrolytes 3) Antiarrythmic medications as per cardiology 4) I/V antibiotics as per ID 5) All consultants notes reviewed and in put appreciated .   Covering for Dr Brizuela and he will follow from tomorrow and if any questions please contact me at office ([phone] cell [phone])

## 2024-09-08 NOTE — DISCHARGE NOTE PROVIDER - NSDCMRMEDTOKEN_GEN_ALL_CORE_FT
acetaminophen 325 mg oral tablet: 2 tab(s) orally every 6 hours As needed Mild Pain (1 - 3)  apixaban 5 mg oral tablet: 1 tab(s) orally 2 times a day  ascorbic acid 1000 mg oral tablet: 1 tab(s) orally once a day  aspirin 81 mg oral tablet, chewable: 1 tab(s) chewed once a day  atorvastatin 40 mg oral tablet: 1 tab(s) orally once a day (at bedtime)  calamine topical lotion: 1 Apply topically to affected area 2 times a day  cholecalciferol 1250 mcg (50,000 intl units) oral capsule: 1 cap(s) orally once a month  cranberry oral capsule: 450 milligram(s) orally once a day  DilTIAZem (Eqv-Cardizem CD) 360 mg/24 hours oral capsule, extended release: 1 cap(s) orally once a day  docusate sodium 100 mg oral capsule: 3 cap(s) orally once a day (at bedtime)  famotidine 20 mg oral tablet: 1 tab(s) orally once a day (in the morning)  ferrous sulfate 325 mg (65 mg elemental iron) oral tablet: 1 tab(s) orally 2 times a day  fexofenadine 60 mg oral tablet: 1 tab(s) orally once a day  gabapentin 100 mg oral capsule: 1 cap(s) orally every 12 hours  levocetirizine 5 mg oral tablet: 1 tab(s) orally once a day (at bedtime)  melatonin 5 mg oral tablet: 1 tab(s) orally once a day (at bedtime)  metFORMIN 1000 mg oral tablet: 1 tab(s) orally 2 times a day  metoprolol succinate 100 mg oral tablet, extended release: 1 tab(s) orally every 12 hours  Multiple Vitamins oral tablet: 1 tab(s) orally once a day  naloxegol 25 mg oral tablet: 1 tab(s) orally once a day (in the morning)  oxyBUTYnin 10 mg/24 hr oral tablet, extended release: 1 tab(s) orally once a day (at bedtime)  oxyCODONE 10 mg oral tablet: 1 tab(s) orally 2 times a day for pain (MDD: 2 tabs)  pantoprazole 40 mg oral delayed release tablet: 1 tab(s) orally once a day  polyethylene glycol 3350 oral kit: 17 gram(s) orally once a day  Potassium Chloride (Eqv-Klor-Con M20) 20 mEq oral tablet, extended release: 1 tab(s) orally 2 times a day  predniSONE 10 mg oral tablet: 1 tab(s) orally once a day  Regular Insulin Sliding Scale: Regular insulin sliding  scale  senna (sennosides) 8.6 mg oral tablet: 2 tab(s) orally once a day (at bedtime)  sodium chloride 0.65% nasal spray: 1 spray(s) intranasally once a day both nostrils once daily in the morning  sucralfate 1 g oral tablet: 1 tab(s) orally once a day before meals  tiotropium-olodaterol 2.5 mcg-2.5 mcg/inh inhalation aerosol: 2 puff(s) inhaled once a day  vancomycin 1.25 g intravenous injection: 1.25 gram(s) intravenous every 24 hours last day 8/17/24   acetaminophen 325 mg oral tablet: 2 tab(s) orally every 6 hours As needed Mild Pain (1 - 3)  amiodarone 200 mg oral tablet: 1 tab(s) orally once a day  apixaban 5 mg oral tablet: 1 tab(s) orally 2 times a day  ascorbic acid 1000 mg oral tablet: 1 tab(s) orally once a day  aspirin 81 mg oral tablet, chewable: 1 tab(s) chewed once a day  atorvastatin 40 mg oral tablet: 1 tab(s) orally once a day (at bedtime)  calamine topical lotion: 1 Apply topically to affected area 2 times a day  cholecalciferol 1250 mcg (50,000 intl units) oral capsule: 1 cap(s) orally once a month  cranberry oral capsule: 450 milligram(s) orally once a day  digoxin 125 mcg (0.125 mg) oral tablet: 1 tab(s) orally once a day  docusate sodium 100 mg oral capsule: 3 cap(s) orally once a day (at bedtime)  famotidine 20 mg oral tablet: 1 tab(s) orally once a day (in the morning)  ferrous sulfate 325 mg (65 mg elemental iron) oral tablet: 1 tab(s) orally 2 times a day  fexofenadine 60 mg oral tablet: 1 tab(s) orally once a day  gabapentin 100 mg oral capsule: 1 cap(s) orally every 12 hours  levocetirizine 5 mg oral tablet: 1 tab(s) orally once a day (at bedtime)  melatonin 5 mg oral tablet: 1 tab(s) orally once a day (at bedtime)  metFORMIN 1000 mg oral tablet: 1 tab(s) orally 2 times a day  metoprolol succinate 100 mg oral tablet, extended release: 1 tab(s) orally every 12 hours  midodrine 5 mg oral tablet: 3 tab(s) orally 3 times a day  Multiple Vitamins oral tablet: 1 tab(s) orally once a day  naloxegol 25 mg oral tablet: 1 tab(s) orally once a day (in the morning)  oxyBUTYnin 10 mg/24 hr oral tablet, extended release: 1 tab(s) orally once a day (at bedtime)  oxyCODONE 10 mg oral tablet: 1 tab(s) orally 2 times a day for pain (MDD: 2 tabs)  pantoprazole 40 mg oral delayed release tablet: 1 tab(s) orally once a day  polyethylene glycol 3350 oral kit: 17 gram(s) orally once a day  Regular Insulin Sliding Scale: Regular insulin sliding  scale  senna (sennosides) 8.6 mg oral tablet: 2 tab(s) orally once a day (at bedtime)  sodium chloride 0.65% nasal spray: 1 spray(s) intranasally once a day both nostrils once daily in the morning  sucralfate 1 g oral tablet: 1 tab(s) orally once a day before meals  sulfamethoxazole-trimethoprim 800 mg-160 mg oral tablet: 1 tab(s) orally 2 times a day  tiotropium-olodaterol 2.5 mcg-2.5 mcg/inh inhalation aerosol: 2 puff(s) inhaled once a day  traMADol 50 mg oral tablet: 1 tab(s) orally every 4 hours As needed Mild Pain (1 - 3)

## 2024-09-08 NOTE — PROGRESS NOTE ADULT - TIME BILLING
in direct care of patient , reviewing labs and other results and adjusting medications and in discussion with RN , PA and consultants .

## 2024-09-08 NOTE — PROGRESS NOTE ADULT - SUBJECTIVE AND OBJECTIVE BOX
[facility] - Brittany Lutz, Zahra, Reynaldo, Román, Francesco Boss  Office Number:  [phone]    Patient resting comfortably in bed in NAD.  Laying flat with no respiratory distress.  No complaints of chest pain, dyspnea, palpitations, PND, or orthopnea.  No overnight events.  AF rates still slightly uncontrolled.  AM metoprolol held for bradycardia    F/U for:  AF    Telemetry: AF, mostly RVR     MEDICATIONS  (STANDING):  aMIOdarone    Tablet   Oral   aMIOdarone    Tablet 400 milliGRAM(s) Oral every 8 hours  ascorbic acid 500 milliGRAM(s) Oral daily  atorvastatin 40 milliGRAM(s) Oral at bedtime  aztreonam  IVPB 1000 milliGRAM(s) IV Intermittent every 6 hours  cetirizine 10 milliGRAM(s) Oral daily  chlorhexidine 2% Cloths 1 Application(s) Topical <User Schedule>  cholecalciferol 1000 Unit(s) Oral daily  dextrose 5%. 1000 milliLiter(s) (100 mL/Hr) IV Continuous <Continuous>  dextrose 5%. 1000 milliLiter(s) (50 mL/Hr) IV Continuous <Continuous>  dextrose 50% Injectable 25 Gram(s) IV Push once  dextrose 50% Injectable 25 Gram(s) IV Push once  dextrose 50% Injectable 12.5 Gram(s) IV Push once  dextrose Oral Gel 15 Gram(s) Oral once  enoxaparin Injectable 100 milliGRAM(s) SubCutaneous every 12 hours  famotidine    Tablet 20 milliGRAM(s) Oral daily  ferrous    sulfate 325 milliGRAM(s) Oral two times a day  gabapentin 100 milliGRAM(s) Oral every 12 hours  glucagon  Injectable 1 milliGRAM(s) IntraMuscular once  insulin lispro (ADMELOG) corrective regimen sliding scale   SubCutaneous three times a day before meals  insulin lispro (ADMELOG) corrective regimen sliding scale   SubCutaneous at bedtime  melatonin 5 milliGRAM(s) Oral at bedtime  metoprolol tartrate 12.5 milliGRAM(s) Oral every 6 hours  midodrine. 15 milliGRAM(s) Oral three times a day  multivitamin 1 Tablet(s) Oral daily  mupirocin 2% Nasal 1 Application(s) Both Nostrils two times a day  naloxegol 25 milliGRAM(s) Oral daily  oxybutynin XL 10 milliGRAM(s) Oral at bedtime  oxyCODONE    IR 10 milliGRAM(s) Oral two times a day  pantoprazole    Tablet 40 milliGRAM(s) Oral before breakfast  polyethylene glycol 3350 17 Gram(s) Oral daily  senna 2 Tablet(s) Oral at bedtime  sucralfate 1 Gram(s) Oral two times a day  tiotropium 2.5 MICROgram(s)/olodaterol 2.5 MICROgram(s) (STIOLTO) Inhaler 2 Puff(s) Inhalation daily    MEDICATIONS  (PRN):  acetaminophen     Tablet .. 650 milliGRAM(s) Oral every 6 hours PRN Temp greater or equal to 38C (100.4F)  HYDROmorphone  Injectable 1 milliGRAM(s) IV Push daily PRN Severe Pain (7 - 10)  sodium chloride 0.65% Nasal 1 Spray(s) Both Nostrils daily PRN Nasal Congestion      Allergies    fish (Hives)  ertapenem (Blisters; Rash)        Vital Signs Last 24 Hrs  T(C): 36.7 (08 Sep 2024 04:15), Max: 37.1 (07 Sep 2024 15:28)  T(F): 98 (08 Sep 2024 04:15), Max: 98.8 (07 Sep 2024 15:28)  HR: 128 (08 Sep 2024 06:00) (91 - 152)  BP: 125/79 (08 Sep 2024 06:00) (101/60 - 150/82)  BP(mean): 98 (08 Sep 2024 06:00) (76 - 107)  RR: 11 (08 Sep 2024 06:00) (8 - 24)  SpO2: 100% (08 Sep 2024 06:00) (87% - 100%)    Parameters below as of 08 Sep 2024 06:00  Patient On (Oxygen Delivery Method): room air        I&O's Summary    07 Sep 2024 07:01  -  08 Sep 2024 07:00  --------------------------------------------------------  IN: 1340 mL / OUT: 1860 mL / NET: -520 mL        ON EXAM:    Constitutional: mild distress secondary to foot pain,   HEENT NC/AT, moist mucous membranes  Pulmonary: Non-labored, breath sounds are clear bilaterally, no wheezing, rales or rhonchi  Cardiovascular: irregular rhythm, tachycardic, reproducible tenderness to palpation of anterior chest wall   Gastrointestinal: Soft, nontender, nondistended, normoactive bowel sounds  Extremities: left foot amputated. right foot wrapped in bandage.   Neurological: Alert, awake, answers questions appropriately, follows commands  Psych: Mood & affect appropriate    LABS: All Labs Reviewed:                        9.0    11.70 )-----------( 615      ( 08 Sep 2024 06:15 )             30.2                         8.5    13.00 )-----------( 556      ( 07 Sep 2024 06:20 )             27.1                         7.3    10.96 )-----------( 470      ( 06 Sep 2024 12:14 )             23.9     08 Sep 2024 06:15    139    |  110    |  9      ----------------------------<  85     3.9     |  22     |  0.67   07 Sep 2024 06:20    139    |  109    |  10     ----------------------------<  93     3.9     |  22     |  0.63   06 Sep 2024 05:46    143    |  112    |  14     ----------------------------<  74     3.9     |  23     |  0.78     Ca    7.7        08 Sep 2024 06:15  Ca    7.7        07 Sep 2024 06:20  Ca    7.6        06 Sep 2024 05:46  Phos  2.6       08 Sep 2024 06:15  Phos  2.5       07 Sep 2024 06:20  Phos  2.9       06 Sep 2024 05:46  Mg     1.9       07 Sep 2024 06:20  Mg     1.4       06 Sep 2024 05:46    TPro  5.3    /  Alb  1.3    /  TBili  0.5    /  DBili  x      /  AST  24     /  ALT  21     /  AlkPhos  179    08 Sep 2024 06:15  TPro  4.9    /  Alb  1.2    /  TBili  0.5    /  DBili  x      /  AST  25     /  ALT  22     /  AlkPhos  181    07 Sep 2024 06:20  TPro  4.3    /  Alb  1.2    /  TBili  0.5    /  DBili  x      /  AST  21     /  ALT  19     /  AlkPhos  144    06 Sep 2024 05:46          Blood Culture: Organism Escherichia coli ESBL  Gram Stain Blood -- Gram Stain --  Specimen Source Swab Swab  Culture-Blood --    Organism --  Gram Stain Blood -- Gram Stain --  Specimen Source .Blood Blood-Peripheral  Culture-Blood --    Organism --  Gram Stain Blood -- Gram Stain --  Specimen Source .Blood Blood-Peripheral  Culture-Blood --    Organism Proteus mirabilis  Gram Stain Blood -- Gram Stain   Rare polymorphonuclear leukocytes per low power field  Numerous Gram Negative Rods per oil power field  Moderate Gram Positive Cocci per oil power field  Specimen Source Tissue Other, Wound  Culture-Blood --        09-05 @ 10:25  TSH: 6.01

## 2024-09-08 NOTE — DISCHARGE NOTE PROVIDER - PROVIDER TOKENS
PROVIDER:[TOKEN:[[id number]],FOLLOWUP:[2 weeks]] PROVIDER:[TOKEN:[[id number]],FOLLOWUP:[2 weeks]],PROVIDER:[TOKEN:[[id number]],FOLLOWUP:[1 week]],PROVIDER:[TOKEN:[[id number]],FOLLOWUP:[2 weeks]]

## 2024-09-08 NOTE — DISCHARGE NOTE PROVIDER - HOSPITAL COURSE
HPI:  55yo male bib ems from wound care with hypotension and poss gangrenous foot, being treated after having amputations, pt denies fever, chills, has no pain  In ER patient was found to be in hypotension and rapid AF.  patient is being admitted for further work up and treatment.   (05 Sep 2024 17:06)      ---  HOSPITAL COURSE: Patient admitted to ICU for management of R heel osteomyelitis complicated by uncontrolled afib.     For afib, s/p amio drip, digoxin load. Started digoxin ggt. Started lopressor 12.5mg q6h. AC with lovenox 100mg.   Started midodrine 15mgTID for hypotension.   On tylenol and oxy for pain. Continuing gabapentin.   Continuing cetrizine and tiotropium.   For R. heel osteo, continuing aztreonam. Patient wishes to hold on amputation in favor of wound debridement at this point.   Appears to have anemia of chronic disease.     [updated on 09/08]       Pt seen and examined on day of discharge. Patient is medically optimized for discharge to home with close outpatient followup.    PHYSICAL EXAM ON DAY OF DISCHARGE:  The patient was seen and examined on the day of discharge. Please see progress note from day of discharge for further information.         ---  CONSULTANTS:     ---  TIME SPENT:  I, the attending physician, was physically present for the key portions of the evaluation and management (E/M) service provided. The total amount of time spent reviewing the hospital notes, laboratory values, imaging findings, assessing/counseling the patient, discussing with consultant physicians, social work, nursing staff was -- minutes    ---  Primary care provider was made aware of plan for discharge:      [  ] NO     [  ] YES   HPI:  53yo male bib ems from wound care with hypotension and poss gangrenous foot, being treated after having amputations, pt denies fever, chills, has no pain  In ER patient was found to be in hypotension and rapid AF.  patient is being admitted for further work up and treatment.   (05 Sep 2024 17:06)      ---  HOSPITAL COURSE: Patient admitted to ICU for management of R heel osteomyelitis complicated by uncontrolled afib.     For afib, s/p amio drip, digoxin load. Started digoxin ggt. Started lopressor 12.5mg q6h. AC with lovenox 100mg.   Started midodrine 15mgTID for hypotension.   On tylenol and oxy for pain. Continuing gabapentin.   Continuing cetrizine and tiotropium.   For R. heel osteo, continuing aztreonam. Patient wishes to hold on amputation in favor of wound debridement at this point.   Appears to have anemia of chronic disease.     [updated on 09/08]       Pt seen and examined on day of discharge. Patient is medically optimized for discharge to home with close outpatient followup.    PHYSICAL EXAM ON DAY OF DISCHARGE:  The patient was seen and examined on the day of discharge. Please see progress note from day of discharge for further information.   pt cleared for dc to rehab with wound vac  to cont bactim course, followed by possible chronic suppressive therapy with bactrim        ---  CONSULTANTS:     ---  TIME SPENT:  I, the attending physician, was physically present for the key portions of the evaluation and management (E/M) service provided. The total amount of time spent reviewing the hospital notes, laboratory values, imaging findings, assessing/counseling the patient, discussing with consultant physicians, social work, nursing staff was -- minutes    ---  Primary care provider was made aware of plan for discharge:      [  ] NO     [  ] YES

## 2024-09-08 NOTE — PROGRESS NOTE ADULT - SUBJECTIVE AND OBJECTIVE BOX
SURGERY PA NOTE ON BEHALF OF DR. Casillas / Vascular SURGERY:    S: Patient seen and examined at bedside.     No new complaints at this time.   Denies fever, chills, n/v/d, chest pain, SOB.     MEDICATIONS:  acetaminophen     Tablet .. 650 milliGRAM(s) Oral every 6 hours PRN  aMIOdarone    Tablet   Oral   aMIOdarone    Tablet 400 milliGRAM(s) Oral every 8 hours  ascorbic acid 500 milliGRAM(s) Oral daily  atorvastatin 40 milliGRAM(s) Oral at bedtime  aztreonam  IVPB 1000 milliGRAM(s) IV Intermittent every 6 hours  cetirizine 10 milliGRAM(s) Oral daily  chlorhexidine 2% Cloths 1 Application(s) Topical <User Schedule>  cholecalciferol 1000 Unit(s) Oral daily  dextrose 5%. 1000 milliLiter(s) IV Continuous <Continuous>  dextrose 5%. 1000 milliLiter(s) IV Continuous <Continuous>  dextrose 50% Injectable 12.5 Gram(s) IV Push once  dextrose 50% Injectable 25 Gram(s) IV Push once  dextrose 50% Injectable 25 Gram(s) IV Push once  dextrose Oral Gel 15 Gram(s) Oral once  enoxaparin Injectable 100 milliGRAM(s) SubCutaneous every 12 hours  famotidine    Tablet 20 milliGRAM(s) Oral daily  ferrous    sulfate 325 milliGRAM(s) Oral two times a day  gabapentin 100 milliGRAM(s) Oral every 12 hours  glucagon  Injectable 1 milliGRAM(s) IntraMuscular once  HYDROmorphone  Injectable 1 milliGRAM(s) IV Push daily PRN  insulin lispro (ADMELOG) corrective regimen sliding scale   SubCutaneous at bedtime  insulin lispro (ADMELOG) corrective regimen sliding scale   SubCutaneous three times a day before meals  magnesium sulfate  IVPB 2 Gram(s) IV Intermittent every 2 hours  melatonin 5 milliGRAM(s) Oral at bedtime  metoprolol tartrate 12.5 milliGRAM(s) Oral every 6 hours  midodrine. 15 milliGRAM(s) Oral three times a day  multivitamin 1 Tablet(s) Oral daily  mupirocin 2% Nasal 1 Application(s) Both Nostrils two times a day  naloxegol 25 milliGRAM(s) Oral daily  oxybutynin XL 10 milliGRAM(s) Oral at bedtime  oxyCODONE    IR 10 milliGRAM(s) Oral two times a day  pantoprazole    Tablet 40 milliGRAM(s) Oral before breakfast  polyethylene glycol 3350 17 Gram(s) Oral daily  senna 2 Tablet(s) Oral at bedtime  sodium chloride 0.65% Nasal 1 Spray(s) Both Nostrils daily PRN  sucralfate 1 Gram(s) Oral two times a day  tiotropium 2.5 MICROgram(s)/olodaterol 2.5 MICROgram(s) (STIOLTO) Inhaler 2 Puff(s) Inhalation daily      O:  Vital Signs Last 24 Hrs  T(C): 36.7 (08 Sep 2024 04:15), Max: 37.1 (07 Sep 2024 15:28)  T(F): 98 (08 Sep 2024 04:15), Max: 98.8 (07 Sep 2024 15:28)  HR: 128 (08 Sep 2024 06:00) (91 - 152)  BP: 125/79 (08 Sep 2024 06:00) (101/60 - 150/82)  BP(mean): 98 (08 Sep 2024 06:00) (76 - 107)  RR: 11 (08 Sep 2024 06:00) (8 - 24)  SpO2: 100% (08 Sep 2024 06:00) (87% - 100%)    Parameters below as of 08 Sep 2024 06:00  Patient On (Oxygen Delivery Method): room air        I&O SUMMARY:    09-07-24 @ 07:01  -  09-08-24 @ 07:00  --------------------------------------------------------  IN: 1340 mL / OUT: 1860 mL / NET: -520 mL        PHYSICAL EXAM:  GENERAL: NAD, lying in bed comfortably, NAD  CHEST/LUNG: Breathing comfortably on RA  HEART: Remains in ICU monitor, HR upto 150 overnight with hx of Afib.   EXTREMITIES, Bilaterally:  diminished sensation of the bilateral feet due to DM neuropathy. Mild edema with chronic venous stasis, left foot with healed TMA. Right foot wrapped in dressing, malodor, dressing is dry/clean. Compartments soft and compressible, legs are warm to touch, sensation is intact   NERVOUS SYSTEM:  Alert & Oriented X3, speech clear. No deficits   Nonpalpable pedal pulses BL.     LABS:                        9.0    11.70 )-----------( 615      ( 08 Sep 2024 06:15 )             30.2     09-08    139  |  110<H>  |  9   ----------------------------<  85  3.9   |  22  |  0.67    Ca    7.7<L>      08 Sep 2024 06:15  Phos  2.6     09-08  Mg     1.0     09-08    TPro  5.3<L>  /  Alb  1.3<L>  /  TBili  0.5  /  DBili  x   /  AST  24  /  ALT  21  /  AlkPhos  179<H>  09-08            ASSESSMENT AND PLAN  53yo male with h/o AF, indwelling Aguilera, CAD s/p stents, CVA, DMT 2, Hypertension, osteomyelitis s/p debridement, BIBEMS from wound care with hypotension and poss gangrenous foot  Vascular surgery consulted. Podiatry for debridement for source control and bone biopsy of right heel, if patient is not hemodynamically stable for surgical intervention at this time, recommending bedside I&D. Positive for MRSA and staph. Arterial duplex completed which demonstrated "Diffuse abnormal monophasic flow throughout R leg. PT and peroneal arteries not seen. Occlusion is not excluded." High risk of right lower extremity limb loss, BKA VS AKA; may require guillotine.     -Podiatry following, dressing changes per podiatry and recommend bedside I&D if patient is not hemodynamically stable for surgical intervention at this time  -Likely will need R BKA, tentative plan for OR next week  -ID for antibiotic recs : Azotreonam  -Pain control PRN  -Recommend obtaining CPK value  -Leg elevation at rest for leg edema  -Physical therapy evaluation  -DM management per primary team  -Rest of primary team  -Will continue to follow  -Discussed with attending

## 2024-09-08 NOTE — DISCHARGE NOTE PROVIDER - NSDCCPCAREPLAN_GEN_ALL_CORE_FT
PRINCIPAL DISCHARGE DIAGNOSIS  Diagnosis: Diabetic ulcer of left foot  Assessment and Plan of Treatment: s/p debridement   no plans for vascular intervention at this time  continue bactrim      SECONDARY DISCHARGE DIAGNOSES  Diagnosis: Rapid atrial fibrillation  Assessment and Plan of Treatment:      PRINCIPAL DISCHARGE DIAGNOSIS  Diagnosis: Diabetic ulcer of left foot  Assessment and Plan of Treatment: s/p debridement   no plans for vascular intervention at this time.  continue bactrim til 10/21/24, then to consider possible daily bactrim suppressive regimen per ID.      SECONDARY DISCHARGE DIAGNOSES  Diagnosis: Rapid atrial fibrillation  Assessment and Plan of Treatment:

## 2024-09-08 NOTE — PROGRESS NOTE ADULT - PROBLEM SELECTOR PLAN 1
cont admelog corrective scale coverage qac/qhs  cont cons cho diet  metformin on hold  goal bg 100-180 in hosp setting.

## 2024-09-08 NOTE — DISCHARGE NOTE PROVIDER - CARE PROVIDER_API CALL
Juventino Whitten  Infectious Disease  [address] [place] [id number]  Phone: [phone]  Fax: [phone]  Follow Up Time: 2 weeks   Juventino Whitten  Infectious Disease  [address] [place] [address]  Phone: [phone]  Fax: [phone]  Follow Up Time: 2 weeks    Tessa Jewell  Podiatric Medicine  [address] [place] [address]  Phone: [phone]  Fax: [phone]  Follow Up Time: 1 week    Grisel Murray  Internal Medicine  [address] [place] [address]  Phone: [phone]  Fax: [phone]  Follow Up Time: 2 weeks

## 2024-09-08 NOTE — PROGRESS NOTE ADULT - ASSESSMENT
54 year old male with a PMH of afib on Eliquis, indwelling jacques, CAD s/p stents, CVA, DM2, HTN, R heel OM s/p debridement, PE who presented to the ED from wound care with hypotension    Neuro:  - Oxy 10 mg BId   - Acetaminophen 650mg q6h prn Fever  - Continue neurontin  - Dilaudid PRN for dressing changes     CV:  - Afib due to sepsis  - S/p Amio gtt, S/p digoxin load, started dig ggt 0.125mg  - Dig level in AM   - Continue Lopressor 12.5 mg q6   - Midodrine 15mg TID     Pulm:  - Cont Cetirizine  - Cont Tiotropium    GI:  - Senna at bedtime  - Diet consistent carb  - Fecal occult results pending collection   - Cont Mobantic    Renal:  - Replete electrolytes as needed    ID:  -Osteomyelitis right foot  -Cont aztreonam    Heme:  - Lovenox 100 BID DVT ppx  - Anemia, rise in Hgb today   - Iron studies, consistent with anemia of chronic disease     Endo:  -Sliding scale d/c due to hypoglycemia   -Monitor blood glucose levels  -Consider starting D5 if glucose <100

## 2024-09-08 NOTE — PROGRESS NOTE ADULT - SUBJECTIVE AND OBJECTIVE BOX
CAPILLARY BLOOD GLUCOSE      POCT Blood Glucose.: 84 mg/dL (08 Sep 2024 08:29)  POCT Blood Glucose.: 90 mg/dL (07 Sep 2024 21:57)  POCT Blood Glucose.: 81 mg/dL (07 Sep 2024 18:37)  POCT Blood Glucose.: 79 mg/dL (07 Sep 2024 11:33)      Vital Signs Last 24 Hrs  T(C): 37 (08 Sep 2024 07:00), Max: 37.1 (07 Sep 2024 15:28)  T(F): 98.6 (08 Sep 2024 07:00), Max: 98.8 (07 Sep 2024 15:28)  HR: 105 (08 Sep 2024 08:00) (91 - 152)  BP: 124/69 (08 Sep 2024 08:00) (101/60 - 150/82)  BP(mean): 91 (08 Sep 2024 08:00) (76 - 107)  RR: 14 (08 Sep 2024 08:00) (8 - 24)  SpO2: 100% (08 Sep 2024 08:00) (87% - 100%)    Parameters below as of 08 Sep 2024 07:00  Patient On (Oxygen Delivery Method): room air        General: WN/WD NAD  Respiratory: CTA B/L  CV: RRR, S1S2, no murmurs, rubs or gallops  Abdominal: Soft, NT, ND +BS, Last BM  Extremities: No edema, + peripheral pulses     09-08    139  |  110<H>  |  9   ----------------------------<  85  3.9   |  22  |  0.67    Ca    7.7<L>      08 Sep 2024 06:15  Phos  2.6     09-08  Mg     1.0     09-08    TPro  5.3<L>  /  Alb  1.3<L>  /  TBili  0.5  /  DBili  x   /  AST  24  /  ALT  21  /  AlkPhos  179<H>  09-08      atorvastatin 40 milliGRAM(s) Oral at bedtime  dextrose 50% Injectable 12.5 Gram(s) IV Push once  dextrose 50% Injectable 25 Gram(s) IV Push once  dextrose 50% Injectable 25 Gram(s) IV Push once  dextrose Oral Gel 15 Gram(s) Oral once  glucagon  Injectable 1 milliGRAM(s) IntraMuscular once  insulin lispro (ADMELOG) corrective regimen sliding scale   SubCutaneous at bedtime  insulin lispro (ADMELOG) corrective regimen sliding scale   SubCutaneous three times a day before meals

## 2024-09-08 NOTE — PROGRESS NOTE ADULT - SUBJECTIVE AND OBJECTIVE BOX
PROGRESS NOTE   Patient is a 54y old  Male who presents with a chief complaint of AF (08 Sep 2024 08:54)      HPI:  53yo male bib ems from wound care with hypotension and poss gangrenous foot, being treated after having amputations, pt denies fever, chills, has no pain  In ER patient was found to be in hypotension and rapid AF.  patient is being admitted for further work up and treatment.   (05 Sep 2024 17:06)      Vital Signs Last 24 Hrs  T(C): 36.7 (08 Sep 2024 11:53), Max: 37.1 (07 Sep 2024 15:28)  T(F): 98.1 (08 Sep 2024 11:53), Max: 98.8 (07 Sep 2024 15:28)  HR: 130 (08 Sep 2024 12:06) (90 - 152)  BP: 100/57 (08 Sep 2024 12:06) (86/55 - 150/82)  BP(mean): 73 (08 Sep 2024 12:06) (65 - 107)  RR: 18 (08 Sep 2024 12:06) (11 - 21)  SpO2: 99% (08 Sep 2024 12:06) (91% - 100%)    Parameters below as of 08 Sep 2024 12:00  Patient On (Oxygen Delivery Method): room air                              9.0    11.70 )-----------( 615      ( 08 Sep 2024 06:15 )             30.2               09-08    139  |  110<H>  |  9   ----------------------------<  85  3.9   |  22  |  0.67    Ca    7.7<L>      08 Sep 2024 06:15  Phos  2.6     09-08  Mg     1.0     09-08    TPro  5.3<L>  /  Alb  1.3<L>  /  TBili  0.5  /  DBili  x   /  AST  24  /  ALT  21  /  AlkPhos  179<H>  09-08        PHYSICAL EXAM  LE Focused:    Vasc:  DP PT pulses on-palpable b/l.  Derm: Extensive ulceration of right posterior heel with exposed bone, severely necrotic wound bed and periwound, severe malodor, tracking circumferentially. Purulent necrotic drainage noted.  Neuro: Diminished protective sensation b/l  MSK: Healed TMA noted to left foot.        PROGRESS NOTE   Patient is a 54y old  Male who presents with a chief complaint of AF (08 Sep 2024 08:54)      HPI:  55yo male bib ems from wound care with hypotension and poss gangrenous foot, being treated after having amputations, pt denies fever, chills, has no pain  In ER patient was found to be in hypotension and rapid AF.  patient is being admitted for further work up and treatment.   (05 Sep 2024 17:06)      Vital Signs Last 24 Hrs  T(C): 36.7 (08 Sep 2024 11:53), Max: 37.1 (07 Sep 2024 15:28)  T(F): 98.1 (08 Sep 2024 11:53), Max: 98.8 (07 Sep 2024 15:28)  HR: 130 (08 Sep 2024 12:06) (90 - 152)  BP: 100/57 (08 Sep 2024 12:06) (86/55 - 150/82)  BP(mean): 73 (08 Sep 2024 12:06) (65 - 107)  RR: 18 (08 Sep 2024 12:06) (11 - 21)  SpO2: 99% (08 Sep 2024 12:06) (91% - 100%)    Parameters below as of 08 Sep 2024 12:00  Patient On (Oxygen Delivery Method): room air                              9.0    11.70 )-----------( 615      ( 08 Sep 2024 06:15 )             30.2               09-08    139  |  110<H>  |  9   ----------------------------<  85  3.9   |  22  |  0.67    Ca    7.7<L>      08 Sep 2024 06:15  Phos  2.6     09-08  Mg     1.0     09-08    TPro  5.3<L>  /  Alb  1.3<L>  /  TBili  0.5  /  DBili  x   /  AST  24  /  ALT  21  /  AlkPhos  179<H>  09-08        PHYSICAL EXAM  LE Focused:    Vasc:  DP PT pulses on-palpable b/l.  Derm: Extensive ulceration of right posterior heel with exposed bone, severely necrotic wound bed and periwound, decreased  malodor, tracking circumferentially. Purulent necrotic drainage noted.  Neuro: Diminished protective sensation b/l  MSK: Healed TMA noted to left foot.

## 2024-09-08 NOTE — PROGRESS NOTE ADULT - SUBJECTIVE AND OBJECTIVE BOX
INTERVAL HPI/OVERNIGHT EVENTS: No acute overnight events occurred.     SUBJECTIVE: Seen and examined pt at bedside. Has no acute complaints at this time.    ICU Vital Signs Last 24 Hrs  T(C): 36.5 (08 Sep 2024 16:00), Max: 37 (07 Sep 2024 20:46)  T(F): 97.7 (08 Sep 2024 16:00), Max: 98.6 (07 Sep 2024 20:46)  HR: 115 (08 Sep 2024 16:01) (90 - 152)  BP: 117/72 (08 Sep 2024 16:01) (86/55 - 150/82)  BP(mean): 90 (08 Sep 2024 16:01) (65 - 107)  ABP: --  ABP(mean): --  RR: 15 (08 Sep 2024 16:01) (11 - 21)  SpO2: 98% (08 Sep 2024 16:01) (91% - 100%)    O2 Parameters below as of 08 Sep 2024 12:00  Patient On (Oxygen Delivery Method): room air              09-07-24 @ 07:01  -  09-08-24 @ 07:00  --------------------------------------------------------  IN: 1340 mL / OUT: 1860 mL / NET: -520 mL    09-08-24 @ 07:01  -  09-08-24 @ 16:40  --------------------------------------------------------  IN: 300 mL / OUT: 620 mL / NET: -320 mL        CAPILLARY BLOOD GLUCOSE      POCT Blood Glucose.: 107 mg/dL (08 Sep 2024 11:55)      I&O's Summary    07 Sep 2024 07:01  -  08 Sep 2024 07:00  --------------------------------------------------------  IN: 1340 mL / OUT: 1860 mL / NET: -520 mL    08 Sep 2024 07:01  -  08 Sep 2024 16:40  --------------------------------------------------------  IN: 300 mL / OUT: 620 mL / NET: -320 mL        PHYSICAL EXAM:  GENERAL: patient appears well, no acute distress, appropriately interactive  LUNGS: good air entry bilaterally, clear to auscultation, symmetric breath sounds, no wheezing or rhonchi appreciated  HEART: rapid afib, no murmurs noted   GASTROINTESTINAL: abdomen is soft, nontender, nondistended, normoactive bowel sounds  INTEGUMENT: good skin turgor, warm skin, appears well perfused  MUSCULOSKELETAL: left LE digit amputation, right heel wrapped   NEUROLOGIC: awake, alert, oriented x3   HEME/LYMPH: no obvious ecchymosis or petechiae       Meds:  aztreonam  IVPB IV Intermittent    aMIOdarone    Tablet Oral  aMIOdarone    Tablet Oral  digoxin     Tablet Oral  metoprolol tartrate Oral  midodrine. Oral    atorvastatin Oral  dextrose 50% Injectable IV Push  dextrose 50% Injectable IV Push  dextrose 50% Injectable IV Push  dextrose Oral Gel Oral  glucagon  Injectable IntraMuscular  insulin lispro (ADMELOG) corrective regimen sliding scale SubCutaneous  insulin lispro (ADMELOG) corrective regimen sliding scale SubCutaneous    cetirizine Oral  tiotropium 2.5 MICROgram(s)/olodaterol 2.5 MICROgram(s) (STIOLTO) Inhaler Inhalation    acetaminophen     Tablet .. Oral PRN  gabapentin Oral  HYDROmorphone  Injectable IV Push PRN  melatonin Oral  oxyCODONE    IR Oral      enoxaparin Injectable SubCutaneous    famotidine    Tablet Oral  naloxegol Oral  pantoprazole    Tablet Oral  polyethylene glycol 3350 Oral  senna Oral  sucralfate Oral    oxybutynin XL Oral    ascorbic acid Oral  cholecalciferol Oral  dextrose 5%. IV Continuous  dextrose 5%. IV Continuous  ferrous    sulfate Oral  multivitamin Oral      chlorhexidine 2% Cloths Topical  mupirocin 2% Nasal Both Nostrils  sodium chloride 0.65% Nasal Both Nostrils PRN                              9.0    11.70 )-----------( 615      ( 08 Sep 2024 06:15 )             30.2       09-08    139  |  110<H>  |  9   ----------------------------<  85  3.9   |  22  |  0.67    Ca    7.7<L>      08 Sep 2024 06:15  Phos  2.6     09-08  Mg     2.5     09-08    TPro  5.3<L>  /  Alb  1.3<L>  /  TBili  0.5  /  DBili  x   /  AST  24  /  ALT  21  /  AlkPhos  179<H>  09-08            Urinalysis Basic - ( 08 Sep 2024 06:15 )    Color: x / Appearance: x / SG: x / pH: x  Gluc: 85 mg/dL / Ketone: x  / Bili: x / Urobili: x   Blood: x / Protein: x / Nitrite: x   Leuk Esterase: x / RBC: x / WBC x   Sq Epi: x / Non Sq Epi: x / Bacteria: x      Swab Swab   Moderate Escherichia coli ESBL  Few Citrobacter freundii  Rare Providencia stuartii  Few Corynebacterium striatum group "Susceptibilities not performed" -- 09-05 @ 12:55  .Blood Blood-Peripheral   No growth at 72 Hours -- 09-05 @ 10:25  .Blood Blood-Peripheral   No growth at 72 Hours -- 09-05 @ 10:20  Tissue Other, Wound   Numerous Proteus mirabilis  Rare Citrobacter freundii  Moderate Enterococcus avium  Rare Klebsiella pneumoniae  Numerous Prevotella buccae "Susceptibilities not performed"   Rare polymorphonuclear leukocytes per low power field  Numerous Gram Negative Rods per oil power field  Moderate Gram Positive Cocci per oil power field 09-05 @ 09:08          Tubes/Lines: sawyer      GLOBAL ISSUE/BEST PRACTICE:  Analgesia: Y  Sedation: N  HOB elevation: Y  Stress ulcer prophylaxis: Y  VTE prophylaxis: Y  Glycemic control: Y  Nutrition: Y    CODE STATUS:  FULL INTERVAL HPI/OVERNIGHT EVENTS: No acute overnight events occurred.     SUBJECTIVE: Seen and examined pt at bedside. Has no acute complaints at this time.    ICU Vital Signs Last 24 Hrs  T(C): 36.5 (08 Sep 2024 16:00), Max: 37 (07 Sep 2024 20:46)  T(F): 97.7 (08 Sep 2024 16:00), Max: 98.6 (07 Sep 2024 20:46)  HR: 115 (08 Sep 2024 16:01) (90 - 152)  BP: 117/72 (08 Sep 2024 16:01) (86/55 - 150/82)  BP(mean): 90 (08 Sep 2024 16:01) (65 - 107)  ABP: --  ABP(mean): --  RR: 15 (08 Sep 2024 16:01) (11 - 21)  SpO2: 98% (08 Sep 2024 16:01) (91% - 100%)    O2 Parameters below as of 08 Sep 2024 12:00  Patient On (Oxygen Delivery Method): room air              09-07-24 @ 07:01  -  09-08-24 @ 07:00  --------------------------------------------------------  IN: 1340 mL / OUT: 1860 mL / NET: -520 mL    09-08-24 @ 07:01  -  09-08-24 @ 16:40  --------------------------------------------------------  IN: 300 mL / OUT: 620 mL / NET: -320 mL        CAPILLARY BLOOD GLUCOSE      POCT Blood Glucose.: 107 mg/dL (08 Sep 2024 11:55)      I&O's Summary    07 Sep 2024 07:01  -  08 Sep 2024 07:00  --------------------------------------------------------  IN: 1340 mL / OUT: 1860 mL / NET: -520 mL    08 Sep 2024 07:01  -  08 Sep 2024 16:40  --------------------------------------------------------  IN: 300 mL / OUT: 620 mL / NET: -320 mL        PHYSICAL EXAM:  GENERAL: patient appears well, no acute distress, appropriately interactive  LUNGS: good air entry bilaterally, clear to auscultation, symmetric breath sounds, no wheezing or rhonchi appreciated  HEART: rapid afib, no murmurs noted   GASTROINTESTINAL: abdomen is soft, nontender, nondistended, normoactive bowel sounds  INTEGUMENT: good skin turgor, warm skin, appears well perfused  MUSCULOSKELETAL: left LE digit amputation, right heel wrapped   NEUROLOGIC: awake, alert, oriented x3       Meds:  aztreonam  IVPB IV Intermittent    aMIOdarone    Tablet Oral  aMIOdarone    Tablet Oral  digoxin     Tablet Oral  metoprolol tartrate Oral  midodrine. Oral    atorvastatin Oral  dextrose 50% Injectable IV Push  dextrose 50% Injectable IV Push  dextrose 50% Injectable IV Push  dextrose Oral Gel Oral  glucagon  Injectable IntraMuscular  insulin lispro (ADMELOG) corrective regimen sliding scale SubCutaneous  insulin lispro (ADMELOG) corrective regimen sliding scale SubCutaneous    cetirizine Oral  tiotropium 2.5 MICROgram(s)/olodaterol 2.5 MICROgram(s) (STIOLTO) Inhaler Inhalation    acetaminophen     Tablet .. Oral PRN  gabapentin Oral  HYDROmorphone  Injectable IV Push PRN  melatonin Oral  oxyCODONE    IR Oral      enoxaparin Injectable SubCutaneous    famotidine    Tablet Oral  naloxegol Oral  pantoprazole    Tablet Oral  polyethylene glycol 3350 Oral  senna Oral  sucralfate Oral    oxybutynin XL Oral    ascorbic acid Oral  cholecalciferol Oral  dextrose 5%. IV Continuous  dextrose 5%. IV Continuous  ferrous    sulfate Oral  multivitamin Oral      chlorhexidine 2% Cloths Topical  mupirocin 2% Nasal Both Nostrils  sodium chloride 0.65% Nasal Both Nostrils PRN                              9.0    11.70 )-----------( 615      ( 08 Sep 2024 06:15 )             30.2       09-08    139  |  110<H>  |  9   ----------------------------<  85  3.9   |  22  |  0.67    Ca    7.7<L>      08 Sep 2024 06:15  Phos  2.6     09-08  Mg     2.5     09-08    TPro  5.3<L>  /  Alb  1.3<L>  /  TBili  0.5  /  DBili  x   /  AST  24  /  ALT  21  /  AlkPhos  179<H>  09-08            Urinalysis Basic - ( 08 Sep 2024 06:15 )    Color: x / Appearance: x / SG: x / pH: x  Gluc: 85 mg/dL / Ketone: x  / Bili: x / Urobili: x   Blood: x / Protein: x / Nitrite: x   Leuk Esterase: x / RBC: x / WBC x   Sq Epi: x / Non Sq Epi: x / Bacteria: x      Swab Swab   Moderate Escherichia coli ESBL  Few Citrobacter freundii  Rare Providencia stuartii  Few Corynebacterium striatum group "Susceptibilities not performed" -- 09-05 @ 12:55  .Blood Blood-Peripheral   No growth at 72 Hours -- 09-05 @ 10:25  .Blood Blood-Peripheral   No growth at 72 Hours -- 09-05 @ 10:20  Tissue Other, Wound   Numerous Proteus mirabilis  Rare Citrobacter freundii  Moderate Enterococcus avium  Rare Klebsiella pneumoniae  Numerous Prevotella buccae "Susceptibilities not performed"   Rare polymorphonuclear leukocytes per low power field  Numerous Gram Negative Rods per oil power field  Moderate Gram Positive Cocci per oil power field 09-05 @ 09:08          Tubes/Lines: sawyer      GLOBAL ISSUE/BEST PRACTICE:  Analgesia: Y  Sedation: N  HOB elevation: Y  Stress ulcer prophylaxis: Y  VTE prophylaxis: Y  Glycemic control: Y  Nutrition: Y    CODE STATUS:  FULL

## 2024-09-08 NOTE — DISCHARGE NOTE PROVIDER - NSDCCPGOAL_GEN_ALL_CORE_FT
To get better and follow your care plan as instructed. Rituxan Counseling:  I discussed with the patient the risks of Rituxan infusions. Side effects can include infusion reactions, severe drug rashes including mucocutaneous reactions, reactivation of latent hepatitis and other infections and rarely progressive multifocal leukoencephalopathy.  All of the patient's questions and concerns were addressed.

## 2024-09-08 NOTE — DISCHARGE NOTE PROVIDER - CARE PROVIDERS DIRECT ADDRESSES
[email or web address]

## 2024-09-08 NOTE — PROGRESS NOTE ADULT - ASSESSMENT
55 YO M with past medical history of AFib (on Eliquis), s/p cardiac arrest x 2 w/ AHRF (on recent admission), indwelling Aguilera, cerebral aneurysm, CAD (s/p stents), CVA, T2DM, HTN, OM (s/p debridement), PE, perforated gastric ulcer s/p ometnopexy 2019 with Dr. Mejia who presents with possible gangrenous right foot. In ED pt found to be in afib with RVR and hypotension.    - pt with septic shock from likely gangrenous foot.   - NO evidence of vol OL  - RRT 9/5 @1620 for rapid AF and hypotension  - s/p Cardizem 15 mg IV x 1, briefly on cardizem gtt (stopped by ICU), s/p Digoxin 500 mcg IVP x 1, then 250 x 2 on 9/6  - Start digoxin 125 pO QD as he remains in RVR  - Metoprolol AM held for hypotension.  To be re dosed this AM.  Continue metoprolol 12.5 QID.    - May eventually need to resume PO ccb  - amiodarone gtt started for rate control. now on oral load with 400 tid to 5 g followed by 200 qd  -c/w midodrine 15mg TID for bp support.      - pt on eliquis at home.   Now on full dose Lovenox     - EKG nonischemic  - Will need for optimization of HR prior to OR. Sepsis likely driving AF w rvr.   - Monitor and replete lytes, keep K>4, Mg>2.  - Will continue to follow.

## 2024-09-09 LAB
ALBUMIN SERPL ELPH-MCNC: 1.3 G/DL — LOW (ref 3.3–5)
ALP SERPL-CCNC: 164 U/L — HIGH (ref 40–120)
ALT FLD-CCNC: 21 U/L — SIGNIFICANT CHANGE UP (ref 12–78)
ANION GAP SERPL CALC-SCNC: 7 MMOL/L — SIGNIFICANT CHANGE UP (ref 5–17)
AST SERPL-CCNC: 22 U/L — SIGNIFICANT CHANGE UP (ref 15–37)
BASOPHILS # BLD AUTO: 0.05 K/UL — SIGNIFICANT CHANGE UP (ref 0–0.2)
BASOPHILS NFR BLD AUTO: 0.5 % — SIGNIFICANT CHANGE UP (ref 0–2)
BILIRUB SERPL-MCNC: 0.4 MG/DL — SIGNIFICANT CHANGE UP (ref 0.2–1.2)
BUN SERPL-MCNC: 7 MG/DL — SIGNIFICANT CHANGE UP (ref 7–23)
CALCIUM SERPL-MCNC: 7.8 MG/DL — LOW (ref 8.5–10.1)
CHLORIDE SERPL-SCNC: 108 MMOL/L — SIGNIFICANT CHANGE UP (ref 96–108)
CO2 SERPL-SCNC: 25 MMOL/L — SIGNIFICANT CHANGE UP (ref 22–31)
CREAT SERPL-MCNC: 0.68 MG/DL — SIGNIFICANT CHANGE UP (ref 0.5–1.3)
DIGOXIN SERPL-MCNC: 1.1 NG/ML — SIGNIFICANT CHANGE UP (ref 0.8–2)
EGFR: 109 ML/MIN/1.73M2 — SIGNIFICANT CHANGE UP
EOSINOPHIL # BLD AUTO: 0.31 K/UL — SIGNIFICANT CHANGE UP (ref 0–0.5)
EOSINOPHIL NFR BLD AUTO: 3.1 % — SIGNIFICANT CHANGE UP (ref 0–6)
GLUCOSE BLDC GLUCOMTR-MCNC: 104 MG/DL — HIGH (ref 70–99)
GLUCOSE BLDC GLUCOMTR-MCNC: 125 MG/DL — HIGH (ref 70–99)
GLUCOSE BLDC GLUCOMTR-MCNC: 126 MG/DL — HIGH (ref 70–99)
GLUCOSE BLDC GLUCOMTR-MCNC: 96 MG/DL — SIGNIFICANT CHANGE UP (ref 70–99)
GLUCOSE SERPL-MCNC: 82 MG/DL — SIGNIFICANT CHANGE UP (ref 70–99)
HCT VFR BLD CALC: 26.5 % — LOW (ref 39–50)
HGB BLD-MCNC: 8.2 G/DL — LOW (ref 13–17)
IMM GRANULOCYTES NFR BLD AUTO: 1.5 % — HIGH (ref 0–0.9)
LYMPHOCYTES # BLD AUTO: 1.48 K/UL — SIGNIFICANT CHANGE UP (ref 1–3.3)
LYMPHOCYTES # BLD AUTO: 14.6 % — SIGNIFICANT CHANGE UP (ref 13–44)
MAGNESIUM SERPL-MCNC: 2.1 MG/DL — SIGNIFICANT CHANGE UP (ref 1.6–2.6)
MCHC RBC-ENTMCNC: 29 PG — SIGNIFICANT CHANGE UP (ref 27–34)
MCHC RBC-ENTMCNC: 30.9 GM/DL — LOW (ref 32–36)
MCV RBC AUTO: 93.6 FL — SIGNIFICANT CHANGE UP (ref 80–100)
MONOCYTES # BLD AUTO: 1.02 K/UL — HIGH (ref 0–0.9)
MONOCYTES NFR BLD AUTO: 10 % — SIGNIFICANT CHANGE UP (ref 2–14)
NEUTROPHILS # BLD AUTO: 7.14 K/UL — SIGNIFICANT CHANGE UP (ref 1.8–7.4)
NEUTROPHILS NFR BLD AUTO: 70.3 % — SIGNIFICANT CHANGE UP (ref 43–77)
NRBC # BLD: 0 /100 WBCS — SIGNIFICANT CHANGE UP (ref 0–0)
PHOSPHATE SERPL-MCNC: 2.8 MG/DL — SIGNIFICANT CHANGE UP (ref 2.5–4.5)
PLATELET # BLD AUTO: 604 K/UL — HIGH (ref 150–400)
POTASSIUM SERPL-MCNC: 3.6 MMOL/L — SIGNIFICANT CHANGE UP (ref 3.5–5.3)
POTASSIUM SERPL-SCNC: 3.6 MMOL/L — SIGNIFICANT CHANGE UP (ref 3.5–5.3)
PROT SERPL-MCNC: 5.2 G/DL — LOW (ref 6–8.3)
RBC # BLD: 2.83 M/UL — LOW (ref 4.2–5.8)
RBC # FLD: 16.8 % — HIGH (ref 10.3–14.5)
SODIUM SERPL-SCNC: 140 MMOL/L — SIGNIFICANT CHANGE UP (ref 135–145)
WBC # BLD: 10.15 K/UL — SIGNIFICANT CHANGE UP (ref 3.8–10.5)
WBC # FLD AUTO: 10.15 K/UL — SIGNIFICANT CHANGE UP (ref 3.8–10.5)

## 2024-09-09 PROCEDURE — 99232 SBSQ HOSP IP/OBS MODERATE 35: CPT | Mod: 57

## 2024-09-09 PROCEDURE — 99233 SBSQ HOSP IP/OBS HIGH 50: CPT

## 2024-09-09 PROCEDURE — 99231 SBSQ HOSP IP/OBS SF/LOW 25: CPT | Mod: 1L

## 2024-09-09 RX ORDER — SULFAMETHOXAZOLE AND TRIMETHOPRIM 800; 160 MG/1; MG/1
1 TABLET ORAL
Refills: 0 | Status: DISCONTINUED | OUTPATIENT
Start: 2024-09-09 | End: 2024-09-10

## 2024-09-09 RX ADMIN — OXYCODONE HYDROCHLORIDE 400 MILLIGRAM(S): 15 TABLET ORAL at 21:57

## 2024-09-09 RX ADMIN — Medication 500 MILLIGRAM(S): at 13:18

## 2024-09-09 RX ADMIN — MIDODRINE HYDROCHLORIDE 15 MILLIGRAM(S): 5 TABLET ORAL at 13:19

## 2024-09-09 RX ADMIN — SULFAMETHOXAZOLE AND TRIMETHOPRIM 1 TABLET(S): 800; 160 TABLET ORAL at 18:11

## 2024-09-09 RX ADMIN — NALOXEGOL OXALATE 25 MILLIGRAM(S): 25 TABLET, FILM COATED ORAL at 13:17

## 2024-09-09 RX ADMIN — Medication 10 MILLIGRAM(S): at 13:18

## 2024-09-09 RX ADMIN — METOPROLOL TARTRATE 12.5 MILLIGRAM(S): 100 TABLET ORAL at 13:20

## 2024-09-09 RX ADMIN — Medication 1 APPLICATION(S): at 05:29

## 2024-09-09 RX ADMIN — METOPROLOL TARTRATE 12.5 MILLIGRAM(S): 100 TABLET ORAL at 05:28

## 2024-09-09 RX ADMIN — METOPROLOL TARTRATE 12.5 MILLIGRAM(S): 100 TABLET ORAL at 18:12

## 2024-09-09 RX ADMIN — OXYCODONE HYDROCHLORIDE 400 MILLIGRAM(S): 15 TABLET ORAL at 05:29

## 2024-09-09 RX ADMIN — Medication 2 TABLET(S): at 21:58

## 2024-09-09 RX ADMIN — Medication 325 MILLIGRAM(S): at 18:12

## 2024-09-09 RX ADMIN — OXYCODONE HYDROCHLORIDE 10 MILLIGRAM(S): 5 TABLET ORAL at 06:30

## 2024-09-09 RX ADMIN — OXYCODONE HYDROCHLORIDE 10 MILLIGRAM(S): 5 TABLET ORAL at 05:30

## 2024-09-09 RX ADMIN — Medication 325 MILLIGRAM(S): at 05:30

## 2024-09-09 RX ADMIN — CHLORHEXIDINE GLUCONATE 1 APPLICATION(S): 40 SOLUTION TOPICAL at 05:31

## 2024-09-09 RX ADMIN — Medication 100 MILLIGRAM(S): at 05:29

## 2024-09-09 RX ADMIN — Medication 40 MILLIGRAM(S): at 21:57

## 2024-09-09 RX ADMIN — Medication 1 TABLET(S): at 13:18

## 2024-09-09 RX ADMIN — ENOXAPARIN SODIUM 100 MILLIGRAM(S): 100 INJECTION SUBCUTANEOUS at 05:30

## 2024-09-09 RX ADMIN — Medication 40 MILLIGRAM(S): at 05:29

## 2024-09-09 RX ADMIN — MIDODRINE HYDROCHLORIDE 15 MILLIGRAM(S): 5 TABLET ORAL at 05:28

## 2024-09-09 RX ADMIN — OXYCODONE HYDROCHLORIDE 10 MILLIGRAM(S): 5 TABLET ORAL at 18:12

## 2024-09-09 RX ADMIN — TIOTROPIUM BROMIDE AND OLODATEROL 2 PUFF(S): 3.124; 2.736 SPRAY, METERED RESPIRATORY (INHALATION) at 06:37

## 2024-09-09 RX ADMIN — MIDODRINE HYDROCHLORIDE 15 MILLIGRAM(S): 5 TABLET ORAL at 22:01

## 2024-09-09 RX ADMIN — DIGOXIN 125 MICROGRAM(S): 0.12 TABLET ORAL at 05:30

## 2024-09-09 RX ADMIN — OXYBUTYNIN CHLORIDE 10 MILLIGRAM(S): 5 TABLET ORAL at 21:58

## 2024-09-09 RX ADMIN — Medication 1 APPLICATION(S): at 18:12

## 2024-09-09 RX ADMIN — Medication 1000 UNIT(S): at 13:19

## 2024-09-09 RX ADMIN — SUCRALFATE 1 GRAM(S): 1 SUSPENSION ORAL at 05:29

## 2024-09-09 RX ADMIN — FAMOTIDINE 20 MILLIGRAM(S): 10 INJECTION INTRAVENOUS at 13:18

## 2024-09-09 RX ADMIN — METOPROLOL TARTRATE 12.5 MILLIGRAM(S): 100 TABLET ORAL at 01:28

## 2024-09-09 RX ADMIN — Medication 5 MILLIGRAM(S): at 21:57

## 2024-09-09 RX ADMIN — Medication 100 MILLIGRAM(S): at 18:11

## 2024-09-09 RX ADMIN — SUCRALFATE 1 GRAM(S): 1 SUSPENSION ORAL at 18:12

## 2024-09-09 RX ADMIN — OXYCODONE HYDROCHLORIDE 400 MILLIGRAM(S): 15 TABLET ORAL at 13:20

## 2024-09-09 RX ADMIN — Medication 50 MILLIGRAM(S): at 01:29

## 2024-09-09 RX ADMIN — ENOXAPARIN SODIUM 100 MILLIGRAM(S): 100 INJECTION SUBCUTANEOUS at 18:12

## 2024-09-09 RX ADMIN — Medication 50 MILLIGRAM(S): at 05:28

## 2024-09-09 NOTE — PROGRESS NOTE ADULT - SUBJECTIVE AND OBJECTIVE BOX
PROGRESS NOTE   Patient is a 54y old  Male who presents with a chief complaint of AF (09 Sep 2024 10:26)      HPI:  55yo male bib ems from wound care with hypotension and poss gangrenous foot, being treated after having amputations, pt denies fever, chills, has no pain  In ER patient was found to be in hypotension and rapid AF.  patient is being admitted for further work up and treatment.   (05 Sep 2024 17:06)      Vital Signs Last 24 Hrs  T(C): 36.7 (09 Sep 2024 06:44), Max: 36.7 (08 Sep 2024 11:53)  T(F): 98 (09 Sep 2024 06:44), Max: 98.1 (08 Sep 2024 11:53)  HR: 104 (09 Sep 2024 06:44) (92 - 138)  BP: 109/71 (09 Sep 2024 06:44) (86/55 - 134/68)  BP(mean): 97 (08 Sep 2024 22:00) (65 - 97)  RR: 18 (09 Sep 2024 06:44) (14 - 19)  SpO2: 97% (09 Sep 2024 06:44) (93% - 100%)    Parameters below as of 09 Sep 2024 06:44  Patient On (Oxygen Delivery Method): room air                              8.2    10.15 )-----------( 604      ( 09 Sep 2024 05:25 )             26.5               09-09    140  |  108  |  7   ----------------------------<  82  3.6   |  25  |  0.68    Ca    7.8<L>      09 Sep 2024 05:25  Phos  2.8     09-09  Mg     2.1     09-09    TPro  5.2<L>  /  Alb  1.3<L>  /  TBili  0.4  /  DBili  x   /  AST  22  /  ALT  21  /  AlkPhos  164<H>  09-09      PHYSICAL EXAM  LE Focused:    Vasc:  DP PT pulses on-palpable b/l.  Derm: Extensive ulceration of right posterior heel with exposed bone, severely necrotic wound bed and periwound, severe malodor, tracking circumferentially. Purulent necrotic drainage noted.  Neuro: Diminished protective sensation b/l  MSK: Healed TMA noted to left foot.        PROGRESS NOTE   Patient is a 54y old  Male who presents with a chief complaint of AF (09 Sep 2024 10:26)      HPI:  55yo male bib ems from wound care with hypotension and poss gangrenous foot, being treated after having amputations, pt denies fever, chills, has no pain  In ER patient was found to be in hypotension and rapid AF.  patient is being admitted for further work up and treatment.   (05 Sep 2024 17:06)      Vital Signs Last 24 Hrs  T(C): 36.7 (09 Sep 2024 06:44), Max: 36.7 (08 Sep 2024 11:53)  T(F): 98 (09 Sep 2024 06:44), Max: 98.1 (08 Sep 2024 11:53)  HR: 104 (09 Sep 2024 06:44) (92 - 138)  BP: 109/71 (09 Sep 2024 06:44) (86/55 - 134/68)  BP(mean): 97 (08 Sep 2024 22:00) (65 - 97)  RR: 18 (09 Sep 2024 06:44) (14 - 19)  SpO2: 97% (09 Sep 2024 06:44) (93% - 100%)    Parameters below as of 09 Sep 2024 06:44  Patient On (Oxygen Delivery Method): room air                              8.2    10.15 )-----------( 604      ( 09 Sep 2024 05:25 )             26.5               09-09    140  |  108  |  7   ----------------------------<  82  3.6   |  25  |  0.68    Ca    7.8<L>      09 Sep 2024 05:25  Phos  2.8     09-09  Mg     2.1     09-09    TPro  5.2<L>  /  Alb  1.3<L>  /  TBili  0.4  /  DBili  x   /  AST  22  /  ALT  21  /  AlkPhos  164<H>  09-09      PHYSICAL EXAM  LE Focused:    Vasc:  DP PT pulses on-palpable b/l.  Derm: Extensive ulceration of right posterior heel with exposed bone, severely necrotic wound bed and periwound, decreased  malodor, tracking circumferentially. Purulent necrotic drainage noted.  Neuro: Diminished protective sensation b/l  MSK: Healed TMA noted to left foot.

## 2024-09-09 NOTE — PROGRESS NOTE ADULT - SUBJECTIVE AND OBJECTIVE BOX
[facility] -- Brittany Lutz, Reynaldo Sweeney Savella, Francesco Boss: Office # [phone]    Follow Up:  Cardiac clearance, A fib RVR    Subjective/Observations: Patient awake, alert, resting in bed. Denies chest pain, palpitations and dizziness. Denies any difficulty breathing. No orthopnea and PND. Tolerating room air.     REVIEW OF SYSTEMS: All other review of systems are negative unless indicated above    PAST MEDICAL & SURGICAL HISTORY:  Diabetes    Diabetes mellitus with no complication      Afib      Hypertension      Hypertension      BPH (benign prostatic hyperplasia)      Perforated gastric ulcer  s/p emergent ex-lap omentopexy and plication 6/2019      Pulmonary embolism      History of non-ST elevation myocardial infarction (NSTEMI)      Osteomyelitis  s/p debridement      CAD S/P percutaneous coronary angioplasty      Cerebrovascular accident      H/O abdominal surgery      Perforated gastric ulcer      Traumatic amputation of left foot, initial encounter    MEDICATIONS  (STANDING):  aMIOdarone    Tablet 400 milliGRAM(s) Oral every 8 hours  aMIOdarone    Tablet   Oral   ascorbic acid 500 milliGRAM(s) Oral daily  atorvastatin 40 milliGRAM(s) Oral at bedtime  aztreonam  IVPB 1000 milliGRAM(s) IV Intermittent every 6 hours  cetirizine 10 milliGRAM(s) Oral daily  chlorhexidine 2% Cloths 1 Application(s) Topical <User Schedule>  cholecalciferol 1000 Unit(s) Oral daily  dextrose 5%. 1000 milliLiter(s) (100 mL/Hr) IV Continuous <Continuous>  dextrose 5%. 1000 milliLiter(s) (50 mL/Hr) IV Continuous <Continuous>  dextrose 50% Injectable 25 Gram(s) IV Push once  dextrose 50% Injectable 25 Gram(s) IV Push once  dextrose 50% Injectable 12.5 Gram(s) IV Push once  dextrose Oral Gel 15 Gram(s) Oral once  digoxin     Tablet 125 MICROGram(s) Oral daily  enoxaparin Injectable 100 milliGRAM(s) SubCutaneous every 12 hours  famotidine    Tablet 20 milliGRAM(s) Oral daily  ferrous    sulfate 325 milliGRAM(s) Oral two times a day  gabapentin 100 milliGRAM(s) Oral every 12 hours  glucagon  Injectable 1 milliGRAM(s) IntraMuscular once  insulin lispro (ADMELOG) corrective regimen sliding scale   SubCutaneous three times a day before meals  insulin lispro (ADMELOG) corrective regimen sliding scale   SubCutaneous at bedtime  melatonin 5 milliGRAM(s) Oral at bedtime  metoprolol tartrate 12.5 milliGRAM(s) Oral every 6 hours  midodrine. 15 milliGRAM(s) Oral three times a day  multivitamin 1 Tablet(s) Oral daily  mupirocin 2% Nasal 1 Application(s) Both Nostrils two times a day  naloxegol 25 milliGRAM(s) Oral daily  oxybutynin XL 10 milliGRAM(s) Oral at bedtime  oxyCODONE    IR 10 milliGRAM(s) Oral two times a day  pantoprazole    Tablet 40 milliGRAM(s) Oral before breakfast  polyethylene glycol 3350 17 Gram(s) Oral daily  senna 2 Tablet(s) Oral at bedtime  sucralfate 1 Gram(s) Oral two times a day  tiotropium 2.5 MICROgram(s)/olodaterol 2.5 MICROgram(s) (STIOLTO) Inhaler 2 Puff(s) Inhalation daily    MEDICATIONS  (PRN):  acetaminophen     Tablet .. 650 milliGRAM(s) Oral every 6 hours PRN Temp greater or equal to 38C (100.4F)  HYDROmorphone  Injectable 1 milliGRAM(s) IV Push daily PRN Severe Pain (7 - 10)  sodium chloride 0.65% Nasal 1 Spray(s) Both Nostrils daily PRN Nasal Congestion    Allergies    fish (Hives)  ertapenem (Blisters; Rash)    Intolerances      Vital Signs Last 24 Hrs  T(C): 36.7 (09 Sep 2024 06:44), Max: 36.7 (08 Sep 2024 11:53)  T(F): 98 (09 Sep 2024 06:44), Max: 98.1 (08 Sep 2024 11:53)  HR: 104 (09 Sep 2024 06:44) (92 - 138)  BP: 109/71 (09 Sep 2024 06:44) (86/55 - 134/68)  BP(mean): 97 (08 Sep 2024 22:00) (65 - 97)  RR: 18 (09 Sep 2024 06:44) (14 - 19)  SpO2: 97% (09 Sep 2024 06:44) (93% - 100%)    Parameters below as of 09 Sep 2024 06:44  Patient On (Oxygen Delivery Method): room air      I&O's Summary    08 Sep 2024 07:01  -  09 Sep 2024 07:00  --------------------------------------------------------  IN: 799.8 mL / OUT: 1320 mL / NET: -520.2 mL          TELE: A fib 100-110s   PHYSICAL EXAM:  Constitutional: NAD, awake and alert  HEENT: Moist Mucous Membranes, Anicteric  Pulmonary: Non-labored, breath sounds are clear bilaterally, No wheezing, rales or rhonchi  Cardiovascular: Regular, S1 and S2, + murmurs, No rubs, gallops or clicks  Gastrointestinal:  soft, nontender, nondistended   Lymph: No peripheral edema. No lymphadenopathy.   Skin: No visible rashes Right foot DSD intact.   Psych:  Mood & affect appropriate      LABS: All Labs Reviewed:                        8.2    10.15 )-----------( 604      ( 09 Sep 2024 05:25 )             26.5                         9.0    11.70 )-----------( 615      ( 08 Sep 2024 06:15 )             30.2                         8.5    13.00 )-----------( 556      ( 07 Sep 2024 06:20 )             27.1     09 Sep 2024 05:25    140    |  108    |  7      ----------------------------<  82     3.6     |  25     |  0.68   08 Sep 2024 06:15    139    |  110    |  9      ----------------------------<  85     3.9     |  22     |  0.67   07 Sep 2024 06:20    139    |  109    |  10     ----------------------------<  93     3.9     |  22     |  0.63     Ca    7.8        09 Sep 2024 05:25  Ca    7.7        08 Sep 2024 06:15  Ca    7.7        07 Sep 2024 06:20  Phos  2.8       09 Sep 2024 05:25  Phos  2.6       08 Sep 2024 06:15  Phos  2.5       07 Sep 2024 06:20  Mg     2.1       09 Sep 2024 05:25  Mg     2.5       08 Sep 2024 14:45  Mg     1.0       08 Sep 2024 06:15    TPro  5.2    /  Alb  1.3    /  TBili  0.4    /  DBili  x      /  AST  22     /  ALT  21     /  AlkPhos  164    09 Sep 2024 05:25  TPro  5.3    /  Alb  1.3    /  TBili  0.5    /  DBili  x      /  AST  24     /  ALT  21     /  AlkPhos  179    08 Sep 2024 06:15  TPro  4.9    /  Alb  1.2    /  TBili  0.5    /  DBili  x      /  AST  25     /  ALT  22     /  AlkPhos  181    07 Sep 2024 06:20   LIVER FUNCTIONS - ( 09 Sep 2024 05:25 )  Alb: 1.3 g/dL / Pro: 5.2 g/dL / ALK PHOS: 164 U/L / ALT: 21 U/L / AST: 22 U/L / GGT: x                 TRANSTHORACIC ECHOCARDIOGRAM REPORT  ________________________________________________________________________________                                      _______       Pt. Name:       SARAH MORRISON Study Date:    9/5/2024  MRN:            [id number]         YOB: 1970  Accession #:    [id number]        Age:           54 years  Account#:       [id number]       Gender:        M  Heart Rate:                      Height:        74.02 in (188.00 cm)  Rhythm:                          Weight:        207.23 lb (94.00 kg)  Blood Pressure: 97/60 mmHg       BSA/BMI:       2.21 m² / 26.60 kg/m²  ________________________________________________________________________________________  Referring Physician:    [id number] Hill Brizuela  Interpreting Physician: Claudette Jacobs  Primary Sonographer:    Mounika FELDMAN    CPT:               ECHO TTE WO CON COMP W DOPP - 58812.m  Indication(s):     Abnormal electrocardiogram ECG/EKG - R94.31  Procedure:         Transthoracic echocardiogram with2-D, M-mode and complete                     spectral and color flow Doppler.  Ordering Location: [facility]  Admission Status:  Inpatient  Study Information: Image quality for this study is technically difficult.    _______________________________________________________________________________________     CONCLUSIONS:      1. Technically difficult image quality.   2. Left ventricular cavity is normal in size. Left ventricular wall thickness is normal. Left ventricular systolic function is normal withan ejection fraction of 69 % by Castañeda's method of disks. There are no regional wall motion abnormalities seen.   3. There is increased LV mass and concentric hypertrophy.   4. Normal right ventricular cavity size and normal right ventricular systolic function.   5. Left atrium is severely dilated.   6. The right atrium is severely dilated.   7. There is calcification of the mitral valve annulus.   8. Mitral valve leaflets are diffusely calcified.   9. Moderate to severe mitral regurgitation.  10. Trileaflet aortic valve with normal systolic excursion. There is calcification of the aortic valve leaflets.  11. Mild tricuspid regurgitation.  12. Estimated pulmonary artery systolic pressure is 39 mmHg, consistent with borderline pulmonary hypertension.  13. The inferior vena cava is normal in size measuring 1.75 cm in diameter, (normal <2.1cm) with normal inspiratory collapse (normal >50%) consistent with normal right atrial pressure (~3, range 0-5mmHg).    ________________________________________________________________________________________  FINDINGS:     Left Ventricle:  The left ventricular cavity is normal in size. Left ventricular wall thickness is normal. Left ventricular systolic function is normal with a calculated ejection fraction of 69 % by the Castañeda's biplane method of disks. There are no regional wall motion abnormalities seen. There is increased LV mass and concentric hypertrophy. The left ventricular diastolic function is indeterminate.     Right Ventricle:  The right ventricular cavity is normal in size and right ventricular systolic function is normal.     Left Atrium:  The left atrium is severely dilated with an indexed volume of 31.95 ml/m².     Right Atrium:  The right atrium is severely dilated with an indexed volume of 26.74 ml/m².     Aortic Valve:  The aortic valve appears trileaflet with normal systolic excursion. There is calcification of the aortic valve leaflets.     Mitral Valve:  There is calcification of the mitral valve annulus. Mitral valve leaflets are diffusely calcified. There is moderate to severe mitral regurgitation.     Tricuspid Valve:  Structurally normal tricuspid valve with normal leaflet excursion. There is mild tricuspid regurgitation. Estimated pulmonary artery systolicpressure is 39 mmHg, consistent with borderline pulmonary hypertension.     Pulmonic Valve:  The pulmonic valve was not well visualized. There is trace pulmonic regurgitation.     Systemic Veins:  The inferior vena cava is normal in size measuring 1.75 cm in diameter, (normal <2.1cm) with normal inspiratory collapse (normal >50%) consistent with normal right atrial pressure (~3, range 0-5mmHg).  ____________________________________________________________________  QUANTITATIVE DATA:  Left Ventricle Measurements: (Indexed to BSA)     IVSd (2D):   1.4 cm  LVPWd (2D):  1.3 cm  LVIDd (2D):  5.4 cm  LVIDs (2D):  4.0 cm  LV Mass:     306 g  138.7 g/m²  LV Vol d, MOD A2C: 87.4 ml 39.62 ml/m²  LV Vol d, MOD A4C: 81.2 ml 36.78 ml/m²  LV Vol d, MOD BP: 92.3 ml 41.82 ml/m²  LV Vol s, MOD A2C: 29.0 ml 13.13 ml/m²  LV Vol s, MOD A4C: 28.3 ml 12.82 ml/m²  LV Vol s, MOD BP:  28.9 ml 13.08 ml/m²  LVOT SV MOD BP:    63.4 ml  LV EF% MOD BP:     69 %     MV E Vmax:    1.19 m/s  e' lateral:   13.10 cm/s  e'medial:    10.70 cm/s  E/e' lateral: 9.05  E/e' medial:  12.00  E/e' Average: 9.96  MV DT:        148 msec    Aorta Measurements: (Normal range) (Indexed to BSA)     Ao Root d 3.23 cm (3.1 - 3.7 cm) 1.46 cm/m²            Left Atrium Measurements: (Indexed to BSA)  LA Diam 2D: 4.72 cm         Right Atrial Measurements:     RA Vol:       59.00 ml  RA Vol Index: 26.74 ml/m²       LVOT / RVOT/ Qp/Qs Data: (Indexed to BSA)  LVOT Diameter: 2.29 cm  LVOT Area:     4.12 cm²    Mitral Valve Measurements:     MV E Vmax: 1.2 m/s       Tricuspid Valve Measurements:     TR Vmax:          3.0 m/s  TR Peak Gradient: 35.9 mmHg  RA Pressure:      3 mmHg  PASP:             39 mmHg    ________________________________________________________________________________________  Electronically signed on 9/6/2024 at 10:38:59 AM by Claudette Jacobs         *** Final ***

## 2024-09-09 NOTE — PROGRESS NOTE ADULT - SUBJECTIVE AND OBJECTIVE BOX
Patient is a 54y old  Male who presents with a chief complaint of AF (08 Sep 2024 20:58)    Pt denies any pain, fever or chills. No acute events overnight.  Does not want to consider amputation at this time    MEDICATIONS  (STANDING):  aMIOdarone    Tablet   Oral   aMIOdarone    Tablet 400 milliGRAM(s) Oral every 8 hours  ascorbic acid 500 milliGRAM(s) Oral daily  atorvastatin 40 milliGRAM(s) Oral at bedtime  aztreonam  IVPB 1000 milliGRAM(s) IV Intermittent every 6 hours  cetirizine 10 milliGRAM(s) Oral daily  chlorhexidine 2% Cloths 1 Application(s) Topical <User Schedule>  cholecalciferol 1000 Unit(s) Oral daily  dextrose 5%. 1000 milliLiter(s) (50 mL/Hr) IV Continuous <Continuous>  dextrose 5%. 1000 milliLiter(s) (100 mL/Hr) IV Continuous <Continuous>  dextrose 50% Injectable 12.5 Gram(s) IV Push once  dextrose 50% Injectable 25 Gram(s) IV Push once  dextrose 50% Injectable 25 Gram(s) IV Push once  dextrose Oral Gel 15 Gram(s) Oral once  digoxin     Tablet 125 MICROGram(s) Oral daily  enoxaparin Injectable 100 milliGRAM(s) SubCutaneous every 12 hours  famotidine    Tablet 20 milliGRAM(s) Oral daily  ferrous    sulfate 325 milliGRAM(s) Oral two times a day  gabapentin 100 milliGRAM(s) Oral every 12 hours  glucagon  Injectable 1 milliGRAM(s) IntraMuscular once  insulin lispro (ADMELOG) corrective regimen sliding scale   SubCutaneous at bedtime  insulin lispro (ADMELOG) corrective regimen sliding scale   SubCutaneous three times a day before meals  melatonin 5 milliGRAM(s) Oral at bedtime  metoprolol tartrate 12.5 milliGRAM(s) Oral every 6 hours  midodrine. 15 milliGRAM(s) Oral three times a day  multivitamin 1 Tablet(s) Oral daily  mupirocin 2% Nasal 1 Application(s) Both Nostrils two times a day  naloxegol 25 milliGRAM(s) Oral daily  oxybutynin XL 10 milliGRAM(s) Oral at bedtime  oxyCODONE    IR 10 milliGRAM(s) Oral two times a day  pantoprazole    Tablet 40 milliGRAM(s) Oral before breakfast  polyethylene glycol 3350 17 Gram(s) Oral daily  senna 2 Tablet(s) Oral at bedtime  sucralfate 1 Gram(s) Oral two times a day  tiotropium 2.5 MICROgram(s)/olodaterol 2.5 MICROgram(s) (STIOLTO) Inhaler 2 Puff(s) Inhalation daily    MEDICATIONS  (PRN):  acetaminophen     Tablet .. 650 milliGRAM(s) Oral every 6 hours PRN Temp greater or equal to 38C (100.4F)  HYDROmorphone  Injectable 1 milliGRAM(s) IV Push daily PRN Severe Pain (7 - 10)  sodium chloride 0.65% Nasal 1 Spray(s) Both Nostrils daily PRN Nasal Congestion    Allergies  fish (Hives)  ertapenem (Blisters; Rash)    Vital Signs Last 24 Hrs  T(C): 36.7 (09 Sep 2024 06:44), Max: 36.7 (08 Sep 2024 11:53)  T(F): 98 (09 Sep 2024 06:44), Max: 98.1 (08 Sep 2024 11:53)  HR: 104 (09 Sep 2024 06:44) (92 - 138)  BP: 109/71 (09 Sep 2024 06:44) (86/55 - 134/68)  BP(mean): 97 (08 Sep 2024 22:00) (65 - 97)  RR: 18 (09 Sep 2024 06:44) (14 - 19)  SpO2: 97% (09 Sep 2024 06:44) (93% - 100%)    Parameters below as of 09 Sep 2024 06:44  Patient On (Oxygen Delivery Method): room air      I&O's Detail    08 Sep 2024 07:01  -  09 Sep 2024 07:00  --------------------------------------------------------  IN:    IV PiggyBack: 100 mL    IV PiggyBack: 499.8 mL    Oral Fluid: 200 mL  Total IN: 799.8 mL    OUT:    Indwelling Catheter - Urethral (mL): 1320 mL  Total OUT: 1320 mL    Total NET: -520.2 mL          Physical Exam:  General: NAD, resting comfortably in bed  Pulmonary: Nonlabored breathing, no respiratory distress, diminished at bases  Cardiovascular: Normal S1, S2  Abdominal: soft, NT/ND  Extremities: R heel wrapped. Dressing CDI, + malodor  Pulses:   Right:                                                                            FEM [x ]2+ [ ]1+ [ ]doppler    POP [ ]2+ [ ]1+ [ ]doppler                                              DP [ ]2+ [ ]1+ [ x]doppler  PT[ ]2+ [ ]1+ [ ]doppler                                                        LABS:                        8.2    10.15 )-----------( 604      ( 09 Sep 2024 05:25 )             26.5     09-09    140  |  108  |  7   ----------------------------<  82  3.6   |  25  |  0.68    Ca    7.8<L>      09 Sep 2024 05:25  Phos  2.8     09-09  Mg     2.1     09-09    TPro  5.2<L>  /  Alb  1.3<L>  /  TBili  0.4  /  DBili  x   /  AST  22  /  ALT  21  /  AlkPhos  164<H>  09-09      CAPILLARY BLOOD GLUCOSE      POCT Blood Glucose.: 104 mg/dL (09 Sep 2024 08:04)  POCT Blood Glucose.: 115 mg/dL (08 Sep 2024 21:49)  POCT Blood Glucose.: 119 mg/dL (08 Sep 2024 17:06)  POCT Blood Glucose.: 107 mg/dL (08 Sep 2024 11:55)    RECENT CULTURES:  09-05 Swab Swab Escherichia coli ESBL  Citrobacter freundii  Providencia stuartii XXXX   Moderate Escherichia coli ESBL  Few Citrobacter freundii  Rare Providencia stuartii  Few Corynebacterium striatum group "Susceptibilities not performed"    09-05 .Blood Blood-Peripheral XXXX XXXX   No growth at 72 Hours    09-05 .Blood Blood-Peripheral XXXX XXXX   No growth at 72 Hours    09-05 Tissue Other, Wound Proteus mirabilis  Citrobacter freundii  Enterococcus avium  Klebsiella pneumoniae   Rare polymorphonuclear leukocytes per low power field  Numerous Gram Negative Rods per oil power field  Moderate Gram Positive Cocci per oil power field   Numerous Proteus mirabilis  Rare Citrobacter freundii  Moderate Enterococcus avium  Rare Klebsiella pneumoniae  Numerous Prevotella buccae "Susceptibilities not performed"      Radiology and Additional Studies:  < from: [facility] Duplex Low Ext Arterial, Ltd, Right (09.06.24 @ 12:40) >  ACC: [id number] EXAM:  DUPLEX LOW ARTERIES UNI LTD RT   ORDERED BY: JEFF HAYES     PROCEDURE DATE:  09/06/2024          INTERPRETATION:  US LOWER EXTREMITY ARTERIAL DUPLEX LIMITED RIGHT    CLINICAL INFORMATION:  Peripheral artery disease with non-palpable pulses   in right heel wound    COMPARISON: None    Real-time sonography of the right lower extremity arterial system was   performed using a high-resolution linear array transducer and including   color and spectral Doppler.    Mild scattered plaque.. No soft tissue abnormalities are demonstrated in   the right lower extremity.  Flow phase patterns and peak systolic   velocity measurements (in cm/s) were observed as follows:    RIGHT:  CFA: Monophasic; 84  Proximal SFA: Monophasic;77  Mid SFA: Monophasic; 81  Distal SFA: Monophasic;  62  Popliteal: Monophasic;  80, 93  Anterior tibial: Monophasic; 87, 87, 85  Posterior tibial: Not seen;  Peroneal: Not seen;  Dorsalis pedis: Monophasic;  10    IMPRESSION:  *  Diffuse abnormal monophasic flow throughout the right leg.  *  Posterior tibial and peroneal arteries are not seen. Occlusion is not   excluded.    < end of copied text >     Female

## 2024-09-09 NOTE — PROGRESS NOTE ADULT - ASSESSMENT
The patient is in today with concerns of a sinus infection but she also comes in to discuss her abnormal Pap smear results.    Patient was seen in the walk-in clinic about 6 weeks ago with a sinus infection and given 10 days of Augmentin. For the last one week, she has been having purulent rhinorrhea, postnasal drip, frontal headache. She denies any fevers, chills, chest pain or shortness of breath. She has not had any medications for relief of symptoms.    Patient had a Pap smear done which revealed ASCUS.    Review of systems is as above.    Past Medical History:   Diagnosis Date   • Anxiety    • Back pain    • Chronic low back pain    • Depression    • Fibromyalgia    • Gastroesophageal reflux disease    • Seasonal allergic rhinitis    • Tobacco dependence    • Tobacco dependence        Past Surgical History:   Procedure Laterality Date   • TUBAL LIGATION         ALLERGIES:   Allergen Reactions   • Claritin    • Ibuprofen PRURITUS   • Zyrtec [Cetirizine Hcl]        Current Outpatient Prescriptions   Medication Sig Dispense Refill   • clindamycin (CLEOCIN T) 1 % topical solution Apply topically 2 times daily. 30 mL 3   • famotidine (PEPCID) 40 MG tablet Take 1 tablet by mouth nightly. 30 tablet 3   • meclizine HCl (ANTIVERT) 25 MG tablet Take 1 tablet by mouth 3 times daily as needed for Dizziness. 20 tablet 0   • hydroCORTisone (ANUSOL-HC) 2.5 % rectal cream Apply TID for 1 week 30 g 0   • clonazePAM (KLONOPIN) 0.5 MG tablet Take 1 tablet by mouth 2 times daily as needed for Anxiety. 45 tablet 5   • fluticasone (FLONASE) 50 MCG/ACT nasal spray Spray 2 sprays in each nostril daily. 16 g 12   • naproxen (NAPROSYN) 500 MG tablet Take 1 tablet by mouth 2 times daily (with meals). Take for 1 week. 60 tablet 4   • acetaminophen (TYLENOL) 325 MG tablet Take 2 tablets by mouth every 6 hours as needed for Pain. 30 tablet 0   • lidocaine (LIDODERM) 5 % Place 1 patch onto the skin every 24 hours. Remove patch 12 hours after  53 YO M with past medical history of AFib (on Eliquis), s/p cardiac arrest x 2 w/ AHRF (on recent admission), indwelling Aguilera, cerebral aneurysm, CAD (s/p stents), CVA, T2DM, HTN, OM (s/p debridement), PE, perforated gastric ulcer s/p ometnopexy 2019 with Dr. Mejia who presents with possible gangrenous right foot. In ED pt found to be in afib with RVR and hypotension.    - pt with septic shock from likely gangrenous foot.   - RRT 9/5 @1620 for rapid AF and hypotension  - S/p amiodarone gtt started for rate control, now on oral load with 400 tid to 5 g followed by 200 qd  - S/p Cardizem 15 mg IV x 1, briefly on Cardizem gtt (stopped by ICU)  - S/p Digoxin 500 mcg IVP x 1, then 250 x 2 on 9/6  - Continue digoxin 125 pO QD as he remains in RVR  - Continue metoprolol 12.5 QID.    - May eventually need to resume PO CCB  - BP stable and controlled   - Continue midodrine 15mg TID for bp support.   - Pt on Eliquis at home.   Now on full dose Lovenox     - EKG nonischemic  - No evidence of any active ischemia   - Continue statin     - S/P bedside debridements by podiatry  - Will need for optimization of HR prior to OR. Sepsis likely driving AF w rvr.     - Monitor and replete lytes, keep K>4, Mg>2.  - Will continue to follow.    Danny Arce, MS FNP, AGACNP  Nurse Practitioner- Cardiology   Please call on TEAMS   applying 30 patch 0   • calamine lotion Apply lotion to affected area's as needed for itching. 1 Bottle 1   • buPROPion (WELLBUTRIN XL) 150 MG 24 hr tablet Take 1 tablet by mouth daily with 300 mg tablet = 450 mg daily. 30 tablet 11   • buPROPion (WELLBUTRIN XL) 300 MG 24 hr tablet Take 1 tablet by mouth daily with 150 mg tablet = 450 mg daily. 30 tablet 11   • citalopram (CELEXA) 20 MG tablet Take 1 tablet by mouth daily. 30 tablet 11   • montelukast (SINGULAIR) 10 MG tablet TAKE ONE TABLET BY MOUTH EVERY NIGHT AT BEDTIME 30 tablet 5   • cyclobenzaprine (FLEXERIL) 10 MG tablet Take 1 tablet by mouth 3 times daily as needed for Muscle spasms. 30 tablet 0   • Menthol, Topical Analgesic, (BIOFREEZE) 4 % Gel Apply bid to back 1 Tube 3   • Multiple Vitamin (MULTIVITAMIN) capsule Take 1 capsule by mouth daily. 90 capsule 1     No current facility-administered medications for this visit.        Social History     Social History   • Marital status: Single     Spouse name: N/A   • Number of children: N/A   • Years of education: N/A     Occupational History   •       Social History Main Topics   • Smoking status: Former Smoker     Packs/day: 0.25     Years: 20.00     Types: Cigarettes   • Smokeless tobacco: Never Used   • Alcohol use No   • Drug use: No   • Sexual activity: Not on file     Other Topics Concern   • Not on file     Social History Narrative       Family History   Problem Relation Age of Onset   • Diabetes Mother    • Cancer Mother        Physical Exam    Vital Signs:    Vitals:    03/16/17 1037   BP: 126/74   Pulse: 72   Resp: 16   Temp: 99.3 °F (37.4 °C)   TempSrc: Tympanic   Weight: 88 kg   Height: 5' 6\" (1.676 m)     Constitutional:  No acute distress   Integument:  Warm. Dry. No erythema. No rash.    HEENT:  Pupils equal and reacting to light, EOMI (extraocular movements intact), sclerae anicteric, conjunctivae without pallor, bilateral maxillary sinus tenderness.  Neck: Normal range of  motion. No tenderness. Supple. No thyromegaly.  No carotid bruits.   Cardiovascular:  Normal heart rate. Normal rhythm. No murmurs. No rubs. No gallops.    Respiratory:  Clear to auscultation bilaterally with normal respiratory effort. No adventitious sounds.  Abdomen:  Soft, nontender, nondistended  Extremities: No pedal edema    Assessment and Plan      1. Sinusitis. After discussion, start cefdinir 300 mg one capsule twice daily for 2 weeks. Start Flonase, Mucinex. Follow-up if not improved. Use a probiotic to prevent antiemetic associated diarrhea.    2. Abnormal Pap smear with ASCUS. We'll refer to GYN, at patient request

## 2024-09-09 NOTE — SOCIAL WORK PROGRESS NOTE - NSSWPROGRESSNOTE_GEN_ALL_CORE
Discussed at daily rounds. I contacted [facility] and confirmed patient is a long term resident and can return upon d/c. Social work to follow.

## 2024-09-09 NOTE — PROGRESS NOTE ADULT - SUBJECTIVE AND OBJECTIVE BOX
[facility] DIVISION of INFECTIOUS DISEASE  Juventino Whitten MD PhD, Symone Hickey MD, Anne-Marie Li MD, Jeffery Jacobs MD, Ryan Romeo MD  and providing coverage with Melody Armstrong MD  Providing Infectious Disease Consultations at [facility], [facility], [facility]'s    Office# [phone] to schedule follow up appointments  Answering Service for urgent calls or New Consults [phone]  Cell# to text for urgent issues Juventino Whitten [phone]     infectious diseases progress note:    SARAH MORRISON is a 54y y. o. Male patient    Overnight and events of the last 24hrs reviewed    Allergies    fish (Hives)  ertapenem (Blisters; Rash)    Intolerances        ANTIBIOTICS/RELEVANT:  antimicrobials  aztreonam  IVPB 1000 milliGRAM(s) IV Intermittent every 6 hours    immunologic:    OTHER:  acetaminophen     Tablet .. 650 milliGRAM(s) Oral every 6 hours PRN  aMIOdarone    Tablet   Oral   aMIOdarone    Tablet 400 milliGRAM(s) Oral every 8 hours  ascorbic acid 500 milliGRAM(s) Oral daily  atorvastatin 40 milliGRAM(s) Oral at bedtime  cetirizine 10 milliGRAM(s) Oral daily  cholecalciferol 1000 Unit(s) Oral daily  dextrose 5%. 1000 milliLiter(s) IV Continuous <Continuous>  dextrose 5%. 1000 milliLiter(s) IV Continuous <Continuous>  dextrose 50% Injectable 12.5 Gram(s) IV Push once  dextrose 50% Injectable 25 Gram(s) IV Push once  dextrose 50% Injectable 25 Gram(s) IV Push once  dextrose Oral Gel 15 Gram(s) Oral once  digoxin     Tablet 125 MICROGram(s) Oral daily  enoxaparin Injectable 100 milliGRAM(s) SubCutaneous every 12 hours  famotidine    Tablet 20 milliGRAM(s) Oral daily  ferrous    sulfate 325 milliGRAM(s) Oral two times a day  gabapentin 100 milliGRAM(s) Oral every 12 hours  glucagon  Injectable 1 milliGRAM(s) IntraMuscular once  HYDROmorphone  Injectable 1 milliGRAM(s) IV Push daily PRN  insulin lispro (ADMELOG) corrective regimen sliding scale   SubCutaneous at bedtime  insulin lispro (ADMELOG) corrective regimen sliding scale   SubCutaneous three times a day before meals  melatonin 5 milliGRAM(s) Oral at bedtime  metoprolol tartrate 12.5 milliGRAM(s) Oral every 6 hours  midodrine. 15 milliGRAM(s) Oral three times a day  multivitamin 1 Tablet(s) Oral daily  mupirocin 2% Nasal 1 Application(s) Both Nostrils two times a day  naloxegol 25 milliGRAM(s) Oral daily  oxybutynin XL 10 milliGRAM(s) Oral at bedtime  oxyCODONE    IR 10 milliGRAM(s) Oral two times a day  pantoprazole    Tablet 40 milliGRAM(s) Oral before breakfast  polyethylene glycol 3350 17 Gram(s) Oral daily  senna 2 Tablet(s) Oral at bedtime  sodium chloride 0.65% Nasal 1 Spray(s) Both Nostrils daily PRN  sucralfate 1 Gram(s) Oral two times a day  tiotropium 2.5 MICROgram(s)/olodaterol 2.5 MICROgram(s) (STIOLTO) Inhaler 2 Puff(s) Inhalation daily      Objective:  Vital Signs Last 24 Hrs  T(C): 36.7 (09 Sep 2024 06:44), Max: 36.7 (09 Sep 2024 06:44)  T(F): 98 (09 Sep 2024 06:44), Max: 98 (09 Sep 2024 06:44)  HR: 104 (09 Sep 2024 06:44) (92 - 138)  BP: 109/71 (09 Sep 2024 06:44) (86/55 - 134/68)  BP(mean): 97 (08 Sep 2024 22:00) (65 - 97)  RR: 18 (09 Sep 2024 06:44) (14 - 19)  SpO2: 97% (09 Sep 2024 06:44) (93% - 100%)    Parameters below as of 09 Sep 2024 06:44  Patient On (Oxygen Delivery Method): room air        T(C): 36.7 (09-09-24 @ 06:44), Max: 37.1 (09-07-24 @ 15:28)  T(C): 36.7 (09-09-24 @ 06:44), Max: 37.1 (09-07-24 @ 15:28)  T(C): 36.7 (09-09-24 @ 06:44), Max: 37.2 (09-05-24 @ 16:19)    PHYSICAL EXAM:  HEENT: NC atraumatic  Neck: supple  Respiratory: no accessory muscle use, breathing comfortably  Cardiovascular: distant  Gastrointestinal: normal appearing, nondistended  Extremities: no clubbing, no cyanosis,        LABS:                          8.2    10.15 )-----------( 604      ( 09 Sep 2024 05:25 )             26.5       WBC  10.15 09-09 @ 05:25  11.70 09-08 @ 06:15  13.00 09-07 @ 06:20  10.96 09-06 @ 12:14  10.46 09-06 @ 05:46  19.41 09-05 @ 10:35      09-09    140  |  108  |  7   ----------------------------<  82  3.6   |  25  |  0.68    Ca    7.8<L>      09 Sep 2024 05:25  Phos  2.8     09-09  Mg     2.1     09-09    TPro  5.2<L>  /  Alb  1.3<L>  /  TBili  0.4  /  DBili  x   /  AST  22  /  ALT  21  /  AlkPhos  164<H>  09-09      Creatinine: 0.68 mg/dL (09-09-24 @ 05:25)  Creatinine: 0.67 mg/dL (09-08-24 @ 06:15)  Creatinine: 0.63 mg/dL (09-07-24 @ 06:20)  Creatinine: 0.78 mg/dL (09-06-24 @ 05:46)  Creatinine: 1.20 mg/dL (09-05-24 @ 10:25)        Urinalysis Basic - ( 09 Sep 2024 05:25 )    Color: x / Appearance: x / SG: x / pH: x  Gluc: 82 mg/dL / Ketone: x  / Bili: x / Urobili: x   Blood: x / Protein: x / Nitrite: x   Leuk Esterase: x / RBC: x / WBC x   Sq Epi: x / Non Sq Epi: x / Bacteria: x            INFLAMMATORY MARKERS      MICROBIOLOGY:    MRSA/MSSA PCR (09.06.24 @ 05:20)    MRSA PCR Result.: Detected: The results of this test should be interpreted with consideration of  clinical context.  Not Detected result indicates the absence of organisms or that the number  of organisms is below the assay limit of detection.  Detected result indicates the presence of organism nucleic acid.  Indeterminate result may indicate the presence of amplification  inhibitors in specimen; presence or absence of organisms cannot be  determined. Consider collecting new specimen if further testing is still  needed.  This qualitative PCR assay is FDA-approved, and its performance was  established by [facility], [place], [place].   Staph aureus PCR Result: Detected    Culture - Wound Aerobic/Anaerobic (09.05.24 @ 12:55)    -  Tobramycin: S <=2   -  Tobramycin: S <=2   -  Trimethoprim/Sulfamethoxazole: S <=0.5/9.5   -  Trimethoprim/Sulfamethoxazole: S <=0.5/9.5   -  Trimethoprim/Sulfamethoxazole: S <=0.5/9.5   -  Amoxicillin/Clavulanic Acid: R >16/8   -  Amoxicillin/Clavulanic Acid: R >16/8   -  Ampicillin: R >16 These ampicillin results predict results for amoxicillin   -  Ampicillin: R >16 These ampicillin results predict results for amoxicillin   -  Ampicillin: R >16 These ampicillin results predict results for amoxicillin   -  Ampicillin/Sulbactam: R >16/8   -  Ampicillin/Sulbactam: R 8/4   -  Ampicillin/Sulbactam: R >16/8   -  Aztreonam: S 8   -  Aztreonam: R >16   -  Aztreonam: S 8   -  Cefazolin: R >16   -  Cefazolin: R >16   -  Cefazolin: R >16   -  Cefepime: S <=2   -  Cefepime: R >16   -  Cefepime: S <=2   -  Cefoxitin: R >16   -  Cefoxitin: R >16   -  Ceftriaxone: R 32 Enterobacter cloacae, Klebsiella aerogenes, and Citrobacter freundii may develop resistance during prolonged therapy.   -  Ceftriaxone: R >32   -  Ceftriaxone: R 32   -  Ciprofloxacin: R >2   -  Ciprofloxacin: R >2   -  Ciprofloxacin: S <=0.25   -  Ertapenem: S <=0.5   -  Ertapenem: S <=0.5   -  Ertapenem: S <=0.5   -  Gentamicin: S <=2   -  Gentamicin: S <=2   -  Imipenem: S <=1   -  Imipenem: S <=1   -  Levofloxacin: R >4   -  Levofloxacin: S <=0.5   -  Levofloxacin: R >4   -  Meropenem: S <=1   -  Meropenem: S <=1   -  Meropenem: S <=1   -  Piperacillin/Tazobactam: R <=8   -  Piperacillin/Tazobactam: S <=8 Treatment with Pipercillin/Tazobactam is not recommended in severe infections casued by Klebsiella aerogenes, Enterobacter cloacae complex, and Citrobacter freundii complex.   -  Piperacillin/Tazobactam: S <=8   Specimen Source: Swab Swab   Culture Results:   Moderate Escherichia coli ESBL  Few Citrobacter freundii  Rare Providencia stuartii  Few Corynebacterium striatum group "Susceptibilities not performed"   Organism Identification: Escherichia coli ESBL  Citrobacter freundii  Providencia stuartii   Organism: Escherichia coli ESBL   Organism: Citrobacter freundii   Organism: Providencia stuartii   Method Type: BAYLEE   Method Type: BAYLEE   Method Type: BAYLEE        RADIOLOGY & ADDITIONAL STUDIES:

## 2024-09-09 NOTE — PROGRESS NOTE ADULT - PROBLEM SELECTOR PLAN 1
Patient evaluated and chart reviewed.  Discussed severity of patient's condition.  Expressed approximately 5cc's or purulent drainage from heel wound and reapplied DSD.  Patient is at high risk for loss of limb.  Planning for surgery tomorrow, 09/10/2024 for right partial calcanectomy, pending cardiac clearance.  Patient NPO after midnight.  Continue IV abx per ID recs.  Swab results pending.   Elevate lower extremity.   Podiatry will continue to follow closely.  Plan discussed with attending.

## 2024-09-09 NOTE — CASE MANAGEMENT PROGRESS NOTE - NSCMPROGRESSNOTE_GEN_ALL_CORE
Discussed pt in rounds, pt admitted from [facility] with diabetic right heel ulcer.  IV antibiotics changed to PO Bactrim. CM consult noted for managing medical conditions at home, Pt from [facility]. SW following for return to [facility].

## 2024-09-09 NOTE — PROGRESS NOTE ADULT - ASSESSMENT
55yo male with h/o AF, indwelling Aguilera, CAD s/p stents, CVA, DMT 2, Hypertension, osteomyelitis s/p debridement, bib ems from wound care with hypotension and poss gangrenous foot  S/P bedside debridements by podiatry  Arterial duplex reviewed with Dr Hardin  Discussed high likelihood that pt may require a more proximal amputation; again, he does not wish to move forward with this until all other options are attempted  Vascular surgery will be available if pt amenable to amputation for non salvageable R foot  Discussed with Dr Hardin

## 2024-09-09 NOTE — PROGRESS NOTE ADULT - ASSESSMENT
54m with dm, cad, pad, dvt atrial fib, hypertension, stroke, indwelling jacques, PE, indwelling jacques presents from wound care with  infected right heel ulcer  r/o osteomyelitis  leukocytosis   hypotension, atrial fib    Right Heel Osteomyelitis  micro:  Organism: Escherichia coli ESBL   Organism: Citrobacter freundii   Organism: Providencia stuartii  noted severe ertapenem reaction  Podiatry planning for surgery, 09/10/2024 for right partial calcanectomy,  pt reports having metal stents that prevent MRI  aztreonam-will change to Bactrim 9/9    Thank you for consulting us and involving us in the management of this most interesting and challenging case.  We will follow along in the care of this patient. Please call us at [phone] or text me directly on my cell# at [phone] with any concerns.

## 2024-09-09 NOTE — PROGRESS NOTE ADULT - SUBJECTIVE AND OBJECTIVE BOX
Date of Service 09-09-24 @ 17:06    Patient is a 54y old  Male who presents with a chief complaint of AF (09 Sep 2024 11:56)      INTERVAL /OVERNIGHT EVENTS: feels little better, now out of ICU    MEDICATIONS  (STANDING):  aMIOdarone    Tablet   Oral   aMIOdarone    Tablet 400 milliGRAM(s) Oral every 8 hours  ascorbic acid 500 milliGRAM(s) Oral daily  atorvastatin 40 milliGRAM(s) Oral at bedtime  cetirizine 10 milliGRAM(s) Oral daily  cholecalciferol 1000 Unit(s) Oral daily  dextrose 5%. 1000 milliLiter(s) (50 mL/Hr) IV Continuous <Continuous>  dextrose 5%. 1000 milliLiter(s) (100 mL/Hr) IV Continuous <Continuous>  dextrose 50% Injectable 12.5 Gram(s) IV Push once  dextrose 50% Injectable 25 Gram(s) IV Push once  dextrose 50% Injectable 25 Gram(s) IV Push once  dextrose Oral Gel 15 Gram(s) Oral once  digoxin     Tablet 125 MICROGram(s) Oral daily  enoxaparin Injectable 100 milliGRAM(s) SubCutaneous every 12 hours  famotidine    Tablet 20 milliGRAM(s) Oral daily  ferrous    sulfate 325 milliGRAM(s) Oral two times a day  gabapentin 100 milliGRAM(s) Oral every 12 hours  glucagon  Injectable 1 milliGRAM(s) IntraMuscular once  insulin lispro (ADMELOG) corrective regimen sliding scale   SubCutaneous at bedtime  insulin lispro (ADMELOG) corrective regimen sliding scale   SubCutaneous three times a day before meals  melatonin 5 milliGRAM(s) Oral at bedtime  metoprolol tartrate 12.5 milliGRAM(s) Oral every 6 hours  midodrine. 15 milliGRAM(s) Oral three times a day  multivitamin 1 Tablet(s) Oral daily  mupirocin 2% Nasal 1 Application(s) Both Nostrils two times a day  naloxegol 25 milliGRAM(s) Oral daily  oxybutynin XL 10 milliGRAM(s) Oral at bedtime  oxyCODONE    IR 10 milliGRAM(s) Oral two times a day  pantoprazole    Tablet 40 milliGRAM(s) Oral before breakfast  polyethylene glycol 3350 17 Gram(s) Oral daily  senna 2 Tablet(s) Oral at bedtime  sucralfate 1 Gram(s) Oral two times a day  tiotropium 2.5 MICROgram(s)/olodaterol 2.5 MICROgram(s) (STIOLTO) Inhaler 2 Puff(s) Inhalation daily  trimethoprim  160 mG/sulfamethoxazole 800 mG 1 Tablet(s) Oral two times a day    MEDICATIONS  (PRN):  acetaminophen     Tablet .. 650 milliGRAM(s) Oral every 6 hours PRN Temp greater or equal to 38C (100.4F)  HYDROmorphone  Injectable 1 milliGRAM(s) IV Push daily PRN Severe Pain (7 - 10)  sodium chloride 0.65% Nasal 1 Spray(s) Both Nostrils daily PRN Nasal Congestion      Allergies    fish (Hives)  ertapenem (Blisters; Rash)    Intolerances        REVIEW OF SYSTEMS:  CONSTITUTIONAL: + fatigue  EYES: No eye pain, visual disturbances, or discharge  ENMT:  No difficulty hearing, tinnitus, vertigo; No sinus or throat pain  NECK: No pain or stiffness  RESPIRATORY: No cough, wheezing, chills or hemoptysis; No shortness of breath  CARDIOVASCULAR: No chest pain, palpitations, dizziness, or leg swelling  GASTROINTESTINAL: No abdominal or epigastric pain. No nausea, vomiting, or hematemesis; No diarrhea or constipation. No melena or hematochezia.  GENITOURINARY: No dysuria, frequency, hematuria, or incontinence  NEUROLOGICAL: No headaches, memory loss, loss of strength, numbness, or tremors  SKIN: No itching, burning, rashes, or lesions   LYMPH NODES: No enlarged glands  ENDOCRINE: No heat or cold intolerance; No hair loss; No polydipsia or polyuria  MUSCULOSKELETAL: No joint pain or swelling; No muscle, back, or extremity pain  PSYCHIATRIC: No depression, anxiety, mood swings, or difficulty sleeping  HEME/LYMPH: No easy bruising, or bleeding gums  ALLERGY AND IMMUNOLOGIC: No hives or eczema    Vital Signs Last 24 Hrs  T(C): 37.3 (09 Sep 2024 12:28), Max: 37.3 (09 Sep 2024 12:28)  T(F): 99.2 (09 Sep 2024 12:28), Max: 99.2 (09 Sep 2024 12:28)  HR: 64 (09 Sep 2024 12:28) (64 - 123)  BP: 103/70 (09 Sep 2024 12:28) (102/64 - 124/78)  BP(mean): 97 (08 Sep 2024 22:00) (79 - 97)  RR: 19 (09 Sep 2024 12:28) (14 - 19)  SpO2: 95% (09 Sep 2024 12:28) (95% - 100%)    Parameters below as of 09 Sep 2024 12:28  Patient On (Oxygen Delivery Method): room air        PHYSICAL EXAM:  GENERAL: NAD, well-groomed, well-developed  HEAD:  Atraumatic, Normocephalic  EYES: EOMI, PERRLA, conjunctiva and sclera clear  ENMT: No tonsillar erythema, exudates, or enlargement; Moist mucous membranes, Good dentition, No lesions  NECK: Supple, No JVD, Normal thyroid  NERVOUS SYSTEM:  Alert & Oriented X3, Good concentration; Motor Strength 5/5 B/L upper and lower extremities; DTRs 2+ intact and symmetric  CHEST/LUNG: Clear to auscultation bilaterally; No rales, rhonchi, wheezing, or rubs  HEART: Regular rate and rhythm; No murmurs, rubs, or gallops  ABDOMEN: Soft, Nontender, Nondistended; Bowel sounds present  EXTREMITIES:  left TMA,   LYMPH: No lymphadenopathy noted  SKIN: right heel ulcer    LABS:                        8.2    10.15 )-----------( 604      ( 09 Sep 2024 05:25 )             26.5     09 Sep 2024 05:25    140    |  108    |  7      ----------------------------<  82     3.6     |  25     |  0.68     Ca    7.8        09 Sep 2024 05:25  Phos  2.8       09 Sep 2024 05:25  Mg     2.1       09 Sep 2024 05:25    TPro  5.2    /  Alb  1.3    /  TBili  0.4    /  DBili  x      /  AST  22     /  ALT  21     /  AlkPhos  164    09 Sep 2024 05:25      Urinalysis Basic - ( 09 Sep 2024 05:25 )    Color: x / Appearance: x / SG: x / pH: x  Gluc: 82 mg/dL / Ketone: x  / Bili: x / Urobili: x   Blood: x / Protein: x / Nitrite: x   Leuk Esterase: x / RBC: x / WBC x   Sq Epi: x / Non Sq Epi: x / Bacteria: x      CAPILLARY BLOOD GLUCOSE      POCT Blood Glucose.: 96 mg/dL (09 Sep 2024 16:41)  POCT Blood Glucose.: 126 mg/dL (09 Sep 2024 12:25)  POCT Blood Glucose.: 104 mg/dL (09 Sep 2024 08:04)  POCT Blood Glucose.: 115 mg/dL (08 Sep 2024 21:49)      RADIOLOGY & ADDITIONAL TESTS:    Notes Reviewed:  [x ] YES  [ ] NO    Care Discussed with Consultants/Other Providers [x ] YES  [ ] NO

## 2024-09-10 LAB
BLD GP AB SCN SERPL QL: SIGNIFICANT CHANGE UP
CULTURE RESULTS: ABNORMAL
CULTURE RESULTS: SIGNIFICANT CHANGE UP
CULTURE RESULTS: SIGNIFICANT CHANGE UP
GLUCOSE BLDC GLUCOMTR-MCNC: 134 MG/DL — HIGH (ref 70–99)
GLUCOSE BLDC GLUCOMTR-MCNC: 168 MG/DL — HIGH (ref 70–99)
GLUCOSE BLDC GLUCOMTR-MCNC: 85 MG/DL — SIGNIFICANT CHANGE UP (ref 70–99)
GLUCOSE BLDC GLUCOMTR-MCNC: 87 MG/DL — SIGNIFICANT CHANGE UP (ref 70–99)
GLUCOSE BLDC GLUCOMTR-MCNC: 90 MG/DL — SIGNIFICANT CHANGE UP (ref 70–99)
HCT VFR BLD CALC: 23.5 % — LOW (ref 39–50)
HGB BLD-MCNC: 7.2 G/DL — LOW (ref 13–17)
ORGANISM # SPEC MICROSCOPIC CNT: ABNORMAL
ORGANISM # SPEC MICROSCOPIC CNT: SIGNIFICANT CHANGE UP
SPECIMEN SOURCE: SIGNIFICANT CHANGE UP

## 2024-09-10 PROCEDURE — 28120 PART REMOVAL OF ANKLE/HEEL: CPT | Mod: 1L,RT

## 2024-09-10 PROCEDURE — 74176 CT ABD & PELVIS W/O CONTRAST: CPT | Mod: 26

## 2024-09-10 PROCEDURE — 99232 SBSQ HOSP IP/OBS MODERATE 35: CPT

## 2024-09-10 PROCEDURE — 88311 DECALCIFY TISSUE: CPT | Mod: 26

## 2024-09-10 PROCEDURE — 73630 X-RAY EXAM OF FOOT: CPT | Mod: 26,RT

## 2024-09-10 PROCEDURE — 88304 TISSUE EXAM BY PATHOLOGIST: CPT | Mod: 26

## 2024-09-10 DEVICE — ARISTA 3GR: Type: IMPLANTABLE DEVICE | Site: RIGHT | Status: FUNCTIONAL

## 2024-09-10 DEVICE — SURGICEL NU-KNIT 6 X 9": Type: IMPLANTABLE DEVICE | Site: RIGHT | Status: FUNCTIONAL

## 2024-09-10 RX ORDER — POLYETHYLENE GLYCOL 3350 17 G/17G
17 POWDER, FOR SOLUTION ORAL DAILY
Refills: 0 | Status: DISCONTINUED | OUTPATIENT
Start: 2024-09-10 | End: 2024-09-18

## 2024-09-10 RX ORDER — SUCRALFATE 1 G/10ML
1 SUSPENSION ORAL
Refills: 0 | Status: DISCONTINUED | OUTPATIENT
Start: 2024-09-10 | End: 2024-09-18

## 2024-09-10 RX ORDER — ACETAMINOPHEN 325 MG/1
650 TABLET ORAL EVERY 6 HOURS
Refills: 0 | Status: DISCONTINUED | OUTPATIENT
Start: 2024-09-10 | End: 2024-09-18

## 2024-09-10 RX ORDER — OXYCODONE HYDROCHLORIDE 5 MG/1
10 TABLET ORAL
Refills: 0 | Status: DISCONTINUED | OUTPATIENT
Start: 2024-09-10 | End: 2024-09-12

## 2024-09-10 RX ORDER — ONDANSETRON 2 MG/ML
4 INJECTION, SOLUTION INTRAMUSCULAR; INTRAVENOUS ONCE
Refills: 0 | Status: DISCONTINUED | OUTPATIENT
Start: 2024-09-10 | End: 2024-09-10

## 2024-09-10 RX ORDER — GABAPENTIN 100 MG
100 CAPSULE ORAL EVERY 12 HOURS
Refills: 0 | Status: DISCONTINUED | OUTPATIENT
Start: 2024-09-10 | End: 2024-09-12

## 2024-09-10 RX ORDER — PANTOPRAZOLE SODIUM 40 MG
40 TABLET, DELAYED RELEASE (ENTERIC COATED) ORAL
Refills: 0 | Status: DISCONTINUED | OUTPATIENT
Start: 2024-09-10 | End: 2024-09-18

## 2024-09-10 RX ORDER — MIDODRINE HYDROCHLORIDE 5 MG/1
15 TABLET ORAL THREE TIMES A DAY
Refills: 0 | Status: DISCONTINUED | OUTPATIENT
Start: 2024-09-10 | End: 2024-09-18

## 2024-09-10 RX ORDER — SULFAMETHOXAZOLE AND TRIMETHOPRIM 800; 160 MG/1; MG/1
1 TABLET ORAL
Refills: 0 | Status: DISCONTINUED | OUTPATIENT
Start: 2024-09-10 | End: 2024-09-18

## 2024-09-10 RX ORDER — CLINDAMYCIN PHOSPHATE 150 MG/ML
900 VIAL (ML) INJECTION ONCE
Refills: 0 | Status: DISCONTINUED | OUTPATIENT
Start: 2024-09-10 | End: 2024-09-10

## 2024-09-10 RX ORDER — OXYCODONE HYDROCHLORIDE 15 MG/1
400 TABLET ORAL EVERY 8 HOURS
Refills: 0 | Status: COMPLETED | OUTPATIENT
Start: 2024-09-10 | End: 2024-09-11

## 2024-09-10 RX ORDER — SENNA 187 MG
2 TABLET ORAL AT BEDTIME
Refills: 0 | Status: DISCONTINUED | OUTPATIENT
Start: 2024-09-10 | End: 2024-09-18

## 2024-09-10 RX ORDER — FAMOTIDINE 10 MG/ML
20 INJECTION INTRAVENOUS DAILY
Refills: 0 | Status: DISCONTINUED | OUTPATIENT
Start: 2024-09-10 | End: 2024-09-18

## 2024-09-10 RX ORDER — GLUCAGON INJECTION, SOLUTION 1 MG/.2ML
1 INJECTION, SOLUTION SUBCUTANEOUS ONCE
Refills: 0 | Status: DISCONTINUED | OUTPATIENT
Start: 2024-09-10 | End: 2024-09-18

## 2024-09-10 RX ORDER — HYDROMORPHONE HYDROCHLORIDE 2 MG/1
1 TABLET ORAL DAILY
Refills: 0 | Status: DISCONTINUED | OUTPATIENT
Start: 2024-09-10 | End: 2024-09-17

## 2024-09-10 RX ORDER — OXYCODONE HYDROCHLORIDE 15 MG/1
TABLET ORAL
Refills: 0 | Status: DISCONTINUED | OUTPATIENT
Start: 2024-09-10 | End: 2024-09-18

## 2024-09-10 RX ORDER — HYDROMORPHONE HYDROCHLORIDE 2 MG/1
0.5 TABLET ORAL
Refills: 0 | Status: DISCONTINUED | OUTPATIENT
Start: 2024-09-10 | End: 2024-09-10

## 2024-09-10 RX ORDER — NALOXEGOL OXALATE 25 MG/1
25 TABLET, FILM COATED ORAL DAILY
Refills: 0 | Status: DISCONTINUED | OUTPATIENT
Start: 2024-09-10 | End: 2024-09-18

## 2024-09-10 RX ORDER — ASCORBIC ACID/ASCORBATE SODIUM 500 MG
500 TABLET,CHEWABLE ORAL DAILY
Refills: 0 | Status: DISCONTINUED | OUTPATIENT
Start: 2024-09-10 | End: 2024-09-18

## 2024-09-10 RX ORDER — FERROUS SULFATE 325(65) MG
325 TABLET ORAL
Refills: 0 | Status: DISCONTINUED | OUTPATIENT
Start: 2024-09-10 | End: 2024-09-18

## 2024-09-10 RX ORDER — ACETAMINOPHEN 325 MG/1
1000 TABLET ORAL ONCE
Refills: 0 | Status: COMPLETED | OUTPATIENT
Start: 2024-09-10 | End: 2024-09-10

## 2024-09-10 RX ORDER — DEXTROSE 15 G/33 G
25 GEL IN PACKET (GRAM) ORAL ONCE
Refills: 0 | Status: DISCONTINUED | OUTPATIENT
Start: 2024-09-10 | End: 2024-09-18

## 2024-09-10 RX ORDER — SODIUM CHLORIDE 0.65 %
1 AEROSOL, SPRAY (ML) NASAL DAILY
Refills: 0 | Status: DISCONTINUED | OUTPATIENT
Start: 2024-09-10 | End: 2024-09-18

## 2024-09-10 RX ORDER — METOPROLOL TARTRATE 100 MG/1
12.5 TABLET ORAL EVERY 6 HOURS
Refills: 0 | Status: DISCONTINUED | OUTPATIENT
Start: 2024-09-10 | End: 2024-09-18

## 2024-09-10 RX ORDER — MUPIROCIN 2 %
1 OINTMENT (GRAM) TOPICAL
Refills: 0 | Status: DISCONTINUED | OUTPATIENT
Start: 2024-09-10 | End: 2024-09-18

## 2024-09-10 RX ORDER — OXYCODONE HYDROCHLORIDE 15 MG/1
400 TABLET ORAL EVERY 8 HOURS
Refills: 0 | Status: DISCONTINUED | OUTPATIENT
Start: 2024-09-10 | End: 2024-09-10

## 2024-09-10 RX ORDER — OXYCODONE HYDROCHLORIDE 15 MG/1
200 TABLET ORAL DAILY
Refills: 0 | Status: DISCONTINUED | OUTPATIENT
Start: 2024-09-11 | End: 2024-09-18

## 2024-09-10 RX ORDER — TIOTROPIUM BROMIDE AND OLODATEROL 3.124; 2.736 UG/1; UG/1
2 SPRAY, METERED RESPIRATORY (INHALATION) DAILY
Refills: 0 | Status: DISCONTINUED | OUTPATIENT
Start: 2024-09-10 | End: 2024-09-18

## 2024-09-10 RX ORDER — ASPIRIN 81 MG
81 TABLET, DELAYED RELEASE (ENTERIC COATED) ORAL DAILY
Refills: 0 | Status: DISCONTINUED | OUTPATIENT
Start: 2024-09-10 | End: 2024-09-18

## 2024-09-10 RX ORDER — DEXTROSE 15 G/33 G
15 GEL IN PACKET (GRAM) ORAL ONCE
Refills: 0 | Status: DISCONTINUED | OUTPATIENT
Start: 2024-09-10 | End: 2024-09-18

## 2024-09-10 RX ORDER — DEXTROSE 15 G/33 G
12.5 GEL IN PACKET (GRAM) ORAL ONCE
Refills: 0 | Status: DISCONTINUED | OUTPATIENT
Start: 2024-09-10 | End: 2024-09-18

## 2024-09-10 RX ORDER — DIGOXIN 0.12 MG/1
125 TABLET ORAL DAILY
Refills: 0 | Status: DISCONTINUED | OUTPATIENT
Start: 2024-09-10 | End: 2024-09-18

## 2024-09-10 RX ORDER — FOLIC ACID/MULTIVIT,IRON,MINER 0.4MG-18MG
1000 TABLET,CHEWABLE ORAL DAILY
Refills: 0 | Status: DISCONTINUED | OUTPATIENT
Start: 2024-09-10 | End: 2024-09-18

## 2024-09-10 RX ADMIN — Medication 100 MILLIGRAM(S): at 06:13

## 2024-09-10 RX ADMIN — ENOXAPARIN SODIUM 100 MILLIGRAM(S): 100 INJECTION SUBCUTANEOUS at 04:37

## 2024-09-10 RX ADMIN — Medication 40 MILLIGRAM(S): at 06:14

## 2024-09-10 RX ADMIN — METOPROLOL TARTRATE 12.5 MILLIGRAM(S): 100 TABLET ORAL at 06:12

## 2024-09-10 RX ADMIN — Medication 40 MILLIGRAM(S): at 23:38

## 2024-09-10 RX ADMIN — HYDROMORPHONE HYDROCHLORIDE 1 MILLIGRAM(S): 2 TABLET ORAL at 23:35

## 2024-09-10 RX ADMIN — Medication 100 MILLIGRAM(S): at 23:37

## 2024-09-10 RX ADMIN — METOPROLOL TARTRATE 12.5 MILLIGRAM(S): 100 TABLET ORAL at 00:29

## 2024-09-10 RX ADMIN — MIDODRINE HYDROCHLORIDE 15 MILLIGRAM(S): 5 TABLET ORAL at 06:14

## 2024-09-10 RX ADMIN — SUCRALFATE 1 GRAM(S): 1 SUSPENSION ORAL at 06:13

## 2024-09-10 RX ADMIN — Medication 1 APPLICATION(S): at 06:14

## 2024-09-10 RX ADMIN — OXYCODONE HYDROCHLORIDE 400 MILLIGRAM(S): 15 TABLET ORAL at 06:12

## 2024-09-10 RX ADMIN — Medication 5 MILLIGRAM(S): at 23:40

## 2024-09-10 RX ADMIN — OXYCODONE HYDROCHLORIDE 10 MILLIGRAM(S): 5 TABLET ORAL at 06:13

## 2024-09-10 RX ADMIN — OXYCODONE HYDROCHLORIDE 10 MILLIGRAM(S): 5 TABLET ORAL at 18:53

## 2024-09-10 RX ADMIN — Medication 325 MILLIGRAM(S): at 23:36

## 2024-09-10 RX ADMIN — DIGOXIN 125 MICROGRAM(S): 0.12 TABLET ORAL at 06:13

## 2024-09-10 RX ADMIN — Medication 325 MILLIGRAM(S): at 06:13

## 2024-09-10 RX ADMIN — SULFAMETHOXAZOLE AND TRIMETHOPRIM 1 TABLET(S): 800; 160 TABLET ORAL at 06:14

## 2024-09-10 RX ADMIN — OXYCODONE HYDROCHLORIDE 10 MILLIGRAM(S): 5 TABLET ORAL at 07:13

## 2024-09-10 RX ADMIN — OXYCODONE HYDROCHLORIDE 400 MILLIGRAM(S): 15 TABLET ORAL at 23:40

## 2024-09-10 RX ADMIN — Medication 75 MILLILITER(S): at 14:09

## 2024-09-10 NOTE — PROGRESS NOTE ADULT - PROBLEM SELECTOR PLAN 1
Patient evaluated and chart reviewed.  Significant strikethrough bleeding noted to post-op bandages and bedding.  Dressings were replaced/reinforced with Surgicel, additional ADB pads and paul, with moderate ACE bandage compression.  Hemoglobin 7.2, hematocrit 23.5; being monitored by Dr. Brizuela for possible transfusion.  Podiatry will continue to monitor closely.   Plan to be discussed with attending.

## 2024-09-10 NOTE — PROGRESS NOTE ADULT - SUBJECTIVE AND OBJECTIVE BOX
Date of Service 09-10-24 @ 17:16    Patient is a 54y old  Male who presents with a chief complaint of AF (10 Sep 2024 12:21)      INTERVAL /OVERNIGHT EVENTS: cardiac clearance in place    MEDICATIONS  (STANDING):  aMIOdarone    Tablet 400 milliGRAM(s) Oral every 8 hours  aMIOdarone    Tablet   Oral   ascorbic acid 500 milliGRAM(s) Oral daily  aspirin enteric coated 81 milliGRAM(s) Oral daily  atorvastatin 40 milliGRAM(s) Oral at bedtime  cetirizine 10 milliGRAM(s) Oral daily  cholecalciferol 1000 Unit(s) Oral daily  dextrose 5%. 1000 milliLiter(s) (50 mL/Hr) IV Continuous <Continuous>  dextrose 5%. 1000 milliLiter(s) (100 mL/Hr) IV Continuous <Continuous>  dextrose 50% Injectable 25 Gram(s) IV Push once  dextrose 50% Injectable 12.5 Gram(s) IV Push once  dextrose 50% Injectable 25 Gram(s) IV Push once  dextrose Oral Gel 15 Gram(s) Oral once  digoxin     Tablet 125 MICROGram(s) Oral daily  famotidine    Tablet 20 milliGRAM(s) Oral daily  ferrous    sulfate 325 milliGRAM(s) Oral two times a day  gabapentin 100 milliGRAM(s) Oral every 12 hours  glucagon  Injectable 1 milliGRAM(s) IntraMuscular once  insulin lispro (ADMELOG) corrective regimen sliding scale   SubCutaneous three times a day before meals  insulin lispro (ADMELOG) corrective regimen sliding scale   SubCutaneous at bedtime  melatonin 5 milliGRAM(s) Oral at bedtime  metoprolol tartrate 12.5 milliGRAM(s) Oral every 6 hours  midodrine. 15 milliGRAM(s) Oral three times a day  multivitamin 1 Tablet(s) Oral daily  mupirocin 2% Nasal 1 Application(s) Both Nostrils two times a day  naloxegol 25 milliGRAM(s) Oral daily  oxyCODONE    IR 10 milliGRAM(s) Oral two times a day  pantoprazole    Tablet 40 milliGRAM(s) Oral before breakfast  polyethylene glycol 3350 17 Gram(s) Oral daily  senna 2 Tablet(s) Oral at bedtime  sucralfate 1 Gram(s) Oral two times a day  tiotropium 2.5 MICROgram(s)/olodaterol 2.5 MICROgram(s) (STIOLTO) Inhaler 2 Puff(s) Inhalation daily  trimethoprim  160 mG/sulfamethoxazole 800 mG 1 Tablet(s) Oral two times a day    MEDICATIONS  (PRN):  acetaminophen     Tablet .. 650 milliGRAM(s) Oral every 6 hours PRN Temp greater or equal to 38C (100.4F)  HYDROmorphone  Injectable 1 milliGRAM(s) IV Push daily PRN Severe Pain (7 - 10)  sodium chloride 0.65% Nasal 1 Spray(s) Both Nostrils daily PRN Nasal Congestion      Allergies    fish (Hives)  ertapenem (Blisters; Rash)    Intolerances        REVIEW OF SYSTEMS:  CONSTITUTIONAL: No fever, weight loss, or fatigue  EYES: No eye pain, visual disturbances, or discharge  ENMT:  No difficulty hearing, tinnitus, vertigo; No sinus or throat pain  NECK: No pain or stiffness  RESPIRATORY: No cough, wheezing, chills or hemoptysis; No shortness of breath  CARDIOVASCULAR: No chest pain, palpitations, dizziness, or leg swelling  GASTROINTESTINAL: No abdominal or epigastric pain. No nausea, vomiting, or hematemesis; No diarrhea or constipation. No melena or hematochezia.  GENITOURINARY: No dysuria, frequency, hematuria, or incontinence  NEUROLOGICAL: No headaches, memory loss, loss of strength, numbness, or tremors  SKIN: No itching, burning, rashes, or lesions   LYMPH NODES: No enlarged glands  ENDOCRINE: No heat or cold intolerance; No hair loss; No polydipsia or polyuria  MUSCULOSKELETAL: No joint pain or swelling; No muscle, back, or extremity pain  PSYCHIATRIC: No depression, anxiety, mood swings, or difficulty sleeping  HEME/LYMPH: No easy bruising, or bleeding gums  ALLERGY AND IMMUNOLOGIC: No hives or eczema    Vital Signs Last 24 Hrs  T(C): 36.8 (10 Sep 2024 14:10), Max: 37.1 (09 Sep 2024 20:20)  T(F): 98.2 (10 Sep 2024 14:10), Max: 98.7 (09 Sep 2024 20:20)  HR: 91 (10 Sep 2024 14:10) (90 - 112)  BP: 105/73 (10 Sep 2024 14:10) (81/54 - 119/75)  BP(mean): 84 (10 Sep 2024 14:10) (59 - 84)  RR: 12 (10 Sep 2024 14:10) (12 - 19)  SpO2: 100% (10 Sep 2024 14:10) (94% - 100%)    Parameters below as of 10 Sep 2024 14:10  Patient On (Oxygen Delivery Method): room air        PHYSICAL EXAM:  GENERAL: NAD, well-groomed, well-developed  HEAD:  Atraumatic, Normocephalic  EYES: EOMI, PERRLA, conjunctiva and sclera clear  ENMT: No tonsillar erythema, exudates, or enlargement; Moist mucous membranes, Good dentition, No lesions  NECK: Supple, No JVD, Normal thyroid  NERVOUS SYSTEM:  Alert & Oriented X3, Good concentration; Motor Strength 5/5 B/L upper and lower extremities; DTRs 2+ intact and symmetric  CHEST/LUNG: Clear to auscultation bilaterally; No rales, rhonchi, wheezing, or rubs  HEART: Regular rate and rhythm; No murmurs, rubs, or gallops  ABDOMEN: Soft, Nontender, Nondistended; Bowel sounds present  EXTREMITIES:  2+ Peripheral Pulses, No clubbing, cyanosis, or edema  LYMPH: No lymphadenopathy noted  SKIN: right heel ulcer    LABS:                        7.2    x     )-----------( x        ( 10 Sep 2024 13:40 )             23.5       Ca    7.8        09 Sep 2024 05:25        Urinalysis Basic - ( 09 Sep 2024 05:25 )    Color: x / Appearance: x / SG: x / pH: x  Gluc: 82 mg/dL / Ketone: x  / Bili: x / Urobili: x   Blood: x / Protein: x / Nitrite: x   Leuk Esterase: x / RBC: x / WBC x   Sq Epi: x / Non Sq Epi: x / Bacteria: x      CAPILLARY BLOOD GLUCOSE      POCT Blood Glucose.: 134 mg/dL (10 Sep 2024 16:58)  POCT Blood Glucose.: 85 mg/dL (10 Sep 2024 13:12)  POCT Blood Glucose.: 87 mg/dL (10 Sep 2024 10:09)  POCT Blood Glucose.: 90 mg/dL (10 Sep 2024 08:09)  POCT Blood Glucose.: 125 mg/dL (09 Sep 2024 21:43)      RADIOLOGY & ADDITIONAL TESTS:    Notes Reviewed:  [x ] YES  [ ] NO    Care Discussed with Consultants/Other Providers [x ] YES  [ ] NO

## 2024-09-10 NOTE — PROGRESS NOTE ADULT - ASSESSMENT
53 YO M with past medical history of AFib (on Eliquis), s/p cardiac arrest x 2 w/ AHRF (on recent admission), indwelling Aguilera, cerebral aneurysm, CAD (s/p stents), CVA, T2DM, HTN, OM (s/p debridement), PE, perforated gastric ulcer s/p ometnopexy 2019 with Dr. Mejia who presents with possible gangrenous right foot. In ED pt found to be in afib with RVR and hypotension.    Afib with RVR  - Likely, in the setting of septic shock from gangrenous foot.   - S/p amiodarone gtt.  Continue PO load with 400 tid to a total of 5 g followed by 200 qd  - S/p Cardizem gtt and Dig load as well  - Continue digoxin 125 pO QD.  Dig loevel = 1.1 (9/9)  - Continue metoprolol 12.5 QID.  Plan to switch to long-acting  - Now rate-controlled at 80's.  Continue telemonitoring for now  - May eventually need to resume PO CCB  - Pt on Eliquis at home.  Now on full dose Lovenox, though, can hold as necessary in anticipation for Podia procedure     - TTE showed normal LVF with mod-severe MR  - No evidence of volume overload    - BP stable and controlled   - Continue Midodrine 15mg TID for bp support    - EKG nonischemic  - No evidence of any active ischemia   - Continue statin     - S/P bedside debridements by podiatry  - At this point, he is considered optimized to undergo a low risk non-cardiac procedure (right foot partial calcanectomy with wound debridement).  Routine hemodynamic monitoring recommended    - Monitor and replete lytes, keep K>4, Mg>2.  - Will continue to follow.    Henrietta Kim DNP, NP-C, AGACNP-C  Cardiology   Call TEAMS          53 YO M with past medical history of AFib (on Eliquis), s/p cardiac arrest x 2 w/ AHRF (on recent admission), indwelling Aguilera, cerebral aneurysm, CAD (s/p stents), CVA, T2DM, HTN, OM (s/p debridement), PE, perforated gastric ulcer s/p ometnopexy 2019 with Dr. Mejia who presents with possible gangrenous right foot. In ED pt found to be in afib with RVR and hypotension.    Afib with RVR  - Likely, in the setting of septic shock from gangrenous foot.   - S/p amiodarone gtt.  Continue PO load with 400 tid to a total of 5 g followed by 200 qd  - S/p Cardizem gtt and Dig load as well  - Continue digoxin 125 pO QD.  Dig loevel = 1.1 (9/9)  - Continue metoprolol 12.5 QID.  Plan to switch to long-acting  - Now rate-controlled at 80's.  Continue telemonitoring for now  - May eventually need to resume PO CCB  - Pt on Eliquis at home.  Now on full dose Lovenox, though, can hold as necessary in anticipation for Podia procedure     - TTE showed normal LVF with mod-severe MR  - No evidence of volume overload    - BP stable and controlled   - Continue Midodrine 15mg TID for bp support    - With known Hx of CAD.  EKG nonischemic  - No evidence of any active ischemia   - Continue ASA and statin.  Can hold ASA if needed preop.  Pls, resume postop  - We recommended past admission (July, 2024) to continue both     - S/P bedside debridements by podiatry  - At this point, he is considered optimized to undergo a low risk non-cardiac procedure (right foot partial calcanectomy with wound debridement).  Routine hemodynamic monitoring recommended    - Monitor and replete lytes, keep K>4, Mg>2.  - Will continue to follow.    Henrietta Kim DNP, NP-C, AGACNP-C  Cardiology   Call TEAMS

## 2024-09-10 NOTE — PROGRESS NOTE ADULT - SUBJECTIVE AND OBJECTIVE BOX
CAPILLARY BLOOD GLUCOSE      POCT Blood Glucose.: 125 mg/dL (09 Sep 2024 21:43)  POCT Blood Glucose.: 96 mg/dL (09 Sep 2024 16:41)  POCT Blood Glucose.: 126 mg/dL (09 Sep 2024 12:25)  POCT Blood Glucose.: 104 mg/dL (09 Sep 2024 08:04)      Vital Signs Last 24 Hrs  T(C): 36.5 (10 Sep 2024 06:07), Max: 37.3 (09 Sep 2024 12:28)  T(F): 97.7 (10 Sep 2024 06:07), Max: 99.2 (09 Sep 2024 12:28)  HR: 92 (10 Sep 2024 06:07) (64 - 112)  BP: 106/61 (10 Sep 2024 06:07) (101/70 - 116/73)  BP(mean): --  RR: 19 (10 Sep 2024 06:07) (18 - 19)  SpO2: 96% (10 Sep 2024 06:07) (94% - 96%)    Parameters below as of 10 Sep 2024 06:07  Patient On (Oxygen Delivery Method): room air        General: WN/WD NAD  Respiratory: CTA B/L  CV: RRR, S1S2, no murmurs, rubs or gallops  Abdominal: Soft, NT, ND +BS, Last BM  Extremities: No edema, + peripheral pulses     09-09    140  |  108  |  7   ----------------------------<  82  3.6   |  25  |  0.68    Ca    7.8<L>      09 Sep 2024 05:25  Phos  2.8     09-09  Mg     2.1     09-09    TPro  5.2<L>  /  Alb  1.3<L>  /  TBili  0.4  /  DBili  x   /  AST  22  /  ALT  21  /  AlkPhos  164<H>  09-09      atorvastatin 40 milliGRAM(s) Oral at bedtime  dextrose 50% Injectable 12.5 Gram(s) IV Push once  dextrose 50% Injectable 25 Gram(s) IV Push once  dextrose 50% Injectable 25 Gram(s) IV Push once  dextrose Oral Gel 15 Gram(s) Oral once  glucagon  Injectable 1 milliGRAM(s) IntraMuscular once  insulin lispro (ADMELOG) corrective regimen sliding scale   SubCutaneous at bedtime  insulin lispro (ADMELOG) corrective regimen sliding scale   SubCutaneous three times a day before meals

## 2024-09-10 NOTE — PROGRESS NOTE ADULT - SUBJECTIVE AND OBJECTIVE BOX
PROGRESS NOTE   Patient is a 54y old  Male who presents with a chief complaint of AF (10 Sep 2024 17:16)      HPI:  53yo male bib ems from wound care with hypotension and poss gangrenous foot, being treated after having amputations, pt denies fever, chills, has no pain  In ER patient was found to be in hypotension and rapid AF.  patient is being admitted for further work up and treatment.   (05 Sep 2024 17:06)      Vital Signs Last 24 Hrs  T(C): 36.8 (10 Sep 2024 18:01), Max: 37.1 (09 Sep 2024 20:20)  T(F): 98.2 (10 Sep 2024 18:01), Max: 98.7 (09 Sep 2024 20:20)  HR: 112 (10 Sep 2024 18:01) (90 - 112)  BP: 108/72 (10 Sep 2024 18:01) (81/54 - 119/75)  BP(mean): 84 (10 Sep 2024 14:10) (59 - 84)  RR: 19 (10 Sep 2024 18:01) (12 - 19)  SpO2: 98% (10 Sep 2024 18:01) (94% - 100%)    Parameters below as of 10 Sep 2024 18:01  Patient On (Oxygen Delivery Method): room air                              7.2    x     )-----------( x        ( 10 Sep 2024 13:40 )             23.5               09-09    140  |  108  |  7   ----------------------------<  82  3.6   |  25  |  0.68    Ca    7.8<L>      09 Sep 2024 05:25  Phos  2.8     09-09  Mg     2.1     09-09    TPro  5.2<L>  /  Alb  1.3<L>  /  TBili  0.4  /  DBili  x   /  AST  22  /  ALT  21  /  AlkPhos  164<H>  09-09      EXAM:  Strikethrough bleeding noted to post-operative bandages and surrounding bed sheets. No active bleeding noted.

## 2024-09-10 NOTE — PROGRESS NOTE ADULT - ASSESSMENT
54m with dm, cad, pad, dvt atrial fib, hypertension, stroke, indwelling jacques, PE, indwelling jacques presents from wound care with  infected right heel ulcer  r/o osteomyelitis  leukocytosis   hypotension, atrial fib    Right Heel Osteomyelitis  micro:  Organism: Escherichia coli ESBL   Organism: Citrobacter freundii   Organism: Providencia stuartii  noted severe ertapenem reaction  surgery, 09/10/2024  right partial calcanectomy,  pt reports having metal stents that prevent MRI  aztreonam--> changed to Bactrim 9/9-continue for now    Thank you for consulting us and involving us in the management of this most interesting and challenging case.  We will follow along in the care of this patient. Please call us at [phone] or text me directly on my cell# at [phone] with any concerns.

## 2024-09-10 NOTE — PROVIDER CONTACT NOTE (OTHER) - ASSESSMENT
Patient awake, alert and stable. Skin warm and dry to touch. Patient denies any kind of discomfort. No distress.

## 2024-09-10 NOTE — PROGRESS NOTE ADULT - SUBJECTIVE AND OBJECTIVE BOX
[facility] -- Brittany Lutz, Reynaldo Sweeney Savella, , Gera Maya  Office # [phone]    Follow Up:  Cardiac Optimization    Subjective/Observations: Awake and alert, comfortable on RA on supine position.  Denies any form of respiratory or cardiac discomfort.  However, c/o right foot pain.  Rate-controlled Afib overnight    REVIEW OF SYSTEMS: All other review of systems is negative unless indicated above  PAST MEDICAL & SURGICAL HISTORY:  Diabetes      Diabetes mellitus with no complication      Afib      Hypertension      BPH (benign prostatic hyperplasia)      Perforated gastric ulcer  s/p emergent ex-lap omentopexy and plication 6/2019      Pulmonary embolism      History of non-ST elevation myocardial infarction (NSTEMI)      Osteomyelitis  s/p debridement      CAD S/P percutaneous coronary angioplasty      Cerebrovascular accident      H/O abdominal surgery      Perforated gastric ulcer      Traumatic amputation of left foot, initial encounter        MEDICATIONS  (STANDING):  aMIOdarone    Tablet   Oral   aMIOdarone    Tablet 400 milliGRAM(s) Oral every 8 hours  ascorbic acid 500 milliGRAM(s) Oral daily  atorvastatin 40 milliGRAM(s) Oral at bedtime  cetirizine 10 milliGRAM(s) Oral daily  cholecalciferol 1000 Unit(s) Oral daily  dextrose 5%. 1000 milliLiter(s) (100 mL/Hr) IV Continuous <Continuous>  dextrose 5%. 1000 milliLiter(s) (50 mL/Hr) IV Continuous <Continuous>  dextrose 50% Injectable 12.5 Gram(s) IV Push once  dextrose 50% Injectable 25 Gram(s) IV Push once  dextrose 50% Injectable 25 Gram(s) IV Push once  dextrose Oral Gel 15 Gram(s) Oral once  digoxin     Tablet 125 MICROGram(s) Oral daily  enoxaparin Injectable 100 milliGRAM(s) SubCutaneous every 12 hours  famotidine    Tablet 20 milliGRAM(s) Oral daily  ferrous    sulfate 325 milliGRAM(s) Oral two times a day  gabapentin 100 milliGRAM(s) Oral every 12 hours  glucagon  Injectable 1 milliGRAM(s) IntraMuscular once  insulin lispro (ADMELOG) corrective regimen sliding scale   SubCutaneous three times a day before meals  insulin lispro (ADMELOG) corrective regimen sliding scale   SubCutaneous at bedtime  melatonin 5 milliGRAM(s) Oral at bedtime  metoprolol tartrate 12.5 milliGRAM(s) Oral every 6 hours  midodrine. 15 milliGRAM(s) Oral three times a day  multivitamin 1 Tablet(s) Oral daily  mupirocin 2% Nasal 1 Application(s) Both Nostrils two times a day  naloxegol 25 milliGRAM(s) Oral daily  oxybutynin XL 10 milliGRAM(s) Oral at bedtime  oxyCODONE    IR 10 milliGRAM(s) Oral two times a day  pantoprazole    Tablet 40 milliGRAM(s) Oral before breakfast  polyethylene glycol 3350 17 Gram(s) Oral daily  senna 2 Tablet(s) Oral at bedtime  sucralfate 1 Gram(s) Oral two times a day  tiotropium 2.5 MICROgram(s)/olodaterol 2.5 MICROgram(s) (STIOLTO) Inhaler 2 Puff(s) Inhalation daily  trimethoprim  160 mG/sulfamethoxazole 800 mG 1 Tablet(s) Oral two times a day    MEDICATIONS  (PRN):  acetaminophen     Tablet .. 650 milliGRAM(s) Oral every 6 hours PRN Temp greater or equal to 38C (100.4F)  HYDROmorphone  Injectable 1 milliGRAM(s) IV Push daily PRN Severe Pain (7 - 10)  sodium chloride 0.65% Nasal 1 Spray(s) Both Nostrils daily PRN Nasal Congestion    Allergies    fish (Hives)  ertapenem (Blisters; Rash)    Intolerances      Vital Signs Last 24 Hrs  T(C): 36.5 (10 Sep 2024 06:07), Max: 37.3 (09 Sep 2024 12:28)  T(F): 97.7 (10 Sep 2024 06:07), Max: 99.2 (09 Sep 2024 12:28)  HR: 92 (10 Sep 2024 06:07) (64 - 112)  BP: 106/61 (10 Sep 2024 06:07) (101/70 - 116/73)  BP(mean): --  RR: 19 (10 Sep 2024 06:07) (18 - 19)  SpO2: 96% (10 Sep 2024 06:07) (94% - 96%)    Parameters below as of 10 Sep 2024 06:07  Patient On (Oxygen Delivery Method): room air    I&O's Summary    09 Sep 2024 07:01  -  10 Sep 2024 07:00  --------------------------------------------------------  IN: 0 mL / OUT: 1850 mL / NET: -1850 mL    PHYSICAL EXAM:  TELE: [place]  Constitutional: NAD, awake and alert, well-developed  HEENT: Moist Mucous Membranes, Anicteric  Pulmonary: Non-labored, breath sounds are clear bilaterally, No wheezing, rales or rhonchi  Cardiovascular: IRRR, S1 and S2, +murmurs, no rubs, gallops or clicks  Gastrointestinal: Bowel Sounds present, soft, nontender.   Lymph: No peripheral edema. No lymphadenopathy.  Skin: No visible rashes.  right foot ulcer with dressing dry and intact.  Psych:  Mood & affect appropriate  LABS: All Labs Reviewed:                        8.2    10.15 )-----------( 604      ( 09 Sep 2024 05:25 )             26.5                         9.0    11.70 )-----------( 615      ( 08 Sep 2024 06:15 )             30.2     09 Sep 2024 05:25    140    |  108    |  7      ----------------------------<  82     3.6     |  25     |  0.68   08 Sep 2024 06:15    139    |  110    |  9      ----------------------------<  85     3.9     |  22     |  0.67     Ca    7.8        09 Sep 2024 05:25  Ca    7.7        08 Sep 2024 06:15  Phos  2.8       09 Sep 2024 05:25  Phos  2.6       08 Sep 2024 06:15  Mg     2.1       09 Sep 2024 05:25  Mg     2.5       08 Sep 2024 14:45  Mg     1.0       08 Sep 2024 06:15    TPro  5.2    /  Alb  1.3    /  TBili  0.4    /  DBili  x      /  AST  22     /  ALT  21     /  AlkPhos  164    09 Sep 2024 05:25  TPro  5.3    /  Alb  1.3    /  TBili  0.5    /  DBili  x      /  AST  24     /  ALT  21     /  AlkPhos  179    08 Sep 2024 06:15    TRANSTHORACIC ECHOCARDIOGRAM REPORT  ________________________________________________________________________________                                      _______  Pt. Name:       SARAH MORRISON Study Date:    9/5/2024  MRN:            [id number]         YOB: 1970  Accession #:    [id number]        Age:           54 years  Account#:       [id number]       Gender:        M  Heart Rate:                      Height:        74.02 in (188.00 cm)  Rhythm:                          Weight:        207.23 lb (94.00 kg)  Blood Pressure: 97/60 mmHg       BSA/BMI:       2.21 m² / 26.60 kg/m²  ________________________________________________________________________________________  Referring Physician:    [id number] Hill Brizuela  Interpreting Physician: Claudette Jacobs  Primary Sonographer:    Mounika FELDMAN    CPT:               ECHO TTE WO CON COMP W DOPP - 50727.m  Indication(s):     Abnormal electrocardiogram ECG/EKG - R94.31  Procedure:         Transthoracic echocardiogram with2-D, M-mode and complete                     spectral and color flow Doppler.  Ordering Location: [facility]  Admission Status:  Inpatient  Study Information: Image quality for this study is technically difficult.    _______________________________________________________________________________________     CONCLUSIONS:      1. Technically difficult image quality.   2. Left ventricular cavity is normal in size. Left ventricular wall thickness is normal. Left ventricular systolic function is normal with an ejection fraction of 69 % by Castañeda's method of disks. There are no regional wall motion abnormalities seen.   3. There is increased LV mass and concentric hypertrophy.   4. Normal right ventricular cavity size and normal right ventricular systolic function.   5. Left atrium is severely dilated.   6. The right atrium is severely dilated.   7. There is calcification of the mitral valve annulus.   8. Mitral valve leaflets are diffusely calcified.   9. Moderate to severe mitral regurgitation.  10. Trileaflet aortic valve with normal systolic excursion. There is calcification of the aortic valve leaflets.  11. Mild tricuspid regurgitation.  12. Estimated pulmonary artery systolic pressure is 39 mmHg, consistent with borderline pulmonary hypertension.  13. The inferior vena cava is normal in size measuring 1.75 cm in diameter, (normal <2.1cm) with normal inspiratory collapse (normal >50%) consistent with normal right atrial pressure (~3, range 0-5mmHg).    ________________________________________________________________________________________  FINDINGS:     Left Ventricle:  The left ventricular cavity is normal in size. Left ventricular wall thickness is normal. Left ventricular systolic function is normal with a calculated ejection fraction of 69 % by the Castañeda's biplane method of disks. There are no regional wall motion abnormalities seen. There is increased LV mass and concentric hypertrophy. The left ventricular diastolic function is indeterminate.     Right Ventricle:  The right ventricular cavity is normal in size and right ventricular systolic function is normal.     Left Atrium:  The left atrium is severely dilated with an indexed volume of 31.95 ml/m².     Right Atrium:  The right atrium is severely dilated with an indexed volume of 26.74 ml/m².     Aortic Valve:  The aortic valve appears trileaflet with normal systolic excursion. There is calcification of the aortic valve leaflets.     Mitral Valve:  There is calcification of the mitral valve annulus. Mitral valve leaflets are diffusely calcified. There is moderate to severe mitral regurgitation.     Tricuspid Valve:  Structurally normal tricuspid valve with normal leaflet excursion. There is mild tricuspid regurgitation. Estimated pulmonary artery systolicpressure is 39 mmHg, consistent with borderline pulmonary hypertension.     Pulmonic Valve:  The pulmonic valve was not well visualized. There is trace pulmonic regurgitation.     Systemic Veins:  The inferior vena cava is normal in size measuring 1.75 cm in diameter, (normal <2.1cm) with normal inspiratory collapse (normal >50%) consistent with normal right atrial pressure (~3, range 0-5mmHg).  ____________________________________________________________________  QUANTITATIVE DATA:  Left Ventricle Measurements: (Indexed to BSA)     IVSd (2D):   1.4 cm  LVPWd (2D):  1.3 cm  LVIDd (2D):  5.4 cm  LVIDs (2D):  4.0 cm  LV Mass:     306 g  138.7 g/m²  LV Vol d, MOD A2C: 87.4 ml 39.62 ml/m²  LV Vol d, MOD A4C: 81.2 ml 36.78 ml/m²  LV Vol d, MOD BP: 92.3 ml 41.82 ml/m²  LV Vol s, MOD A2C: 29.0 ml 13.13 ml/m²  LV Vol s, MOD A4C: 28.3 ml 12.82 ml/m²  LV Vol s, MOD BP:  28.9 ml 13.08 ml/m²  LVOT SV MOD BP:    63.4 ml  LV EF% MOD BP:     69 %     MV E Vmax:    1.19 m/s  e' lateral:   13.10 cm/s  e'medial:    10.70 cm/s  E/e' lateral: 9.05  E/e' medial:  12.00  E/e' Average: 9.96  MV DT:        148 msec    Aorta Measurements: (Normal range) (Indexed to BSA)     Ao Root d 3.23 cm (3.1 - 3.7 cm) 1.46 cm/m²            Left Atrium Measurements: (Indexed to BSA)  LA Diam 2D: 4.72 cm         Right Atrial Measurements:     RA Vol:       59.00 ml  RA Vol Index: 26.74 ml/m²       LVOT / RVOT/ Qp/Qs Data: (Indexed to BSA)  LVOT Diameter: 2.29 cm  LVOT Area:     4.12 cm²    Mitral Valve Measurements:     MV E Vmax: 1.2 m/s       Tricuspid Valve Measurements:     TR Vmax:          3.0 m/s  TR Peak Gradient: 35.9 mmHg  RA Pressure:      3 mmHg  PASP:             39 mmHg    ________________________________________________________________________________________  Electronically signed on 9/6/2024 at 10:38:59 AM by Claudette Jacobs         *** Final ***

## 2024-09-10 NOTE — PROGRESS NOTE ADULT - SUBJECTIVE AND OBJECTIVE BOX
[facility]  Juventino Whitten MD PhD, Symone Hickey MD, Anne-Marie Li MD, Jeffery Jacobs MD, Ryan Romeo MD  and providing coverage with Melody Armstrong MD  Providing Infectious Disease Consultations at [facility], [facility], [place], [place], [facility], [facility]'s    Office# [phone] to schedule follow up appointments  Answering Service for urgent calls or New Consults [phone]  Cell# to text for urgent issues Juventino Whitten [phone]     infectious diseases progress note:    SARAH MORRISON is a 54y y. o. Male patient    Overnight and events of the last 24hrs reviewed    Allergies    fish (Hives)  ertapenem (Blisters; Rash)    Intolerances        ANTIBIOTICS/RELEVANT:  antimicrobials    immunologic:    OTHER:      Objective:  Vital Signs Last 24 Hrs  T(C): 36.8 (10 Sep 2024 09:50), Max: 37.3 (09 Sep 2024 12:28)  T(F): 98.2 (10 Sep 2024 09:50), Max: 99.2 (09 Sep 2024 12:28)  HR: 100 (10 Sep 2024 09:50) (64 - 112)  BP: 119/75 (10 Sep 2024 09:50) (101/70 - 119/75)  BP(mean): --  RR: 18 (10 Sep 2024 09:50) (18 - 19)  SpO2: 97% (10 Sep 2024 09:50) (94% - 97%)    Parameters below as of 10 Sep 2024 06:07  Patient On (Oxygen Delivery Method): room air        T(C): 36.8 (09-10-24 @ 09:50), Max: 37.3 (09-09-24 @ 12:28)  T(C): 36.8 (09-10-24 @ 09:50), Max: 37.3 (09-09-24 @ 12:28)  T(C): 36.8 (09-10-24 @ 09:50), Max: 37.3 (09-09-24 @ 12:28)    PHYSICAL EXAM:  HEENT: NC atraumatic  Neck: supple  Respiratory: no accessory muscle use, breathing comfortably  Cardiovascular: distant  Gastrointestinal: normal appearing, nondistended  Extremities: no clubbing, no cyanosis,        LABS:                          8.2    10.15 )-----------( 604      ( 09 Sep 2024 05:25 )             26.5       WBC  10.15 09-09 @ 05:25  11.70 09-08 @ 06:15  13.00 09-07 @ 06:20  10.96 09-06 @ 12:14  10.46 09-06 @ 05:46  19.41 09-05 @ 10:35      09-09    140  |  108  |  7   ----------------------------<  82  3.6   |  25  |  0.68    Ca    7.8<L>      09 Sep 2024 05:25  Phos  2.8     09-09  Mg     2.1     09-09    TPro  5.2<L>  /  Alb  1.3<L>  /  TBili  0.4  /  DBili  x   /  AST  22  /  ALT  21  /  AlkPhos  164<H>  09-09      Creatinine: 0.68 mg/dL (09-09-24 @ 05:25)  Creatinine: 0.67 mg/dL (09-08-24 @ 06:15)  Creatinine: 0.63 mg/dL (09-07-24 @ 06:20)  Creatinine: 0.78 mg/dL (09-06-24 @ 05:46)  Creatinine: 1.20 mg/dL (09-05-24 @ 10:25)        Urinalysis Basic - ( 09 Sep 2024 05:25 )    Color: x / Appearance: x / SG: x / pH: x  Gluc: 82 mg/dL / Ketone: x  / Bili: x / Urobili: x   Blood: x / Protein: x / Nitrite: x   Leuk Esterase: x / RBC: x / WBC x   Sq Epi: x / Non Sq Epi: x / Bacteria: x            INFLAMMATORY MARKERS      MICROBIOLOGY:              RADIOLOGY & ADDITIONAL STUDIES:

## 2024-09-10 NOTE — PROVIDER CONTACT NOTE (OTHER) - ACTION/TREATMENT ORDERED:
Dr Brizuela notified. said to follow up type and screen at 8pm, call house doctor for stomach pain.
Dr Fox podiatry resident notified. said will assess pt.
Dr. Reynoso informed regarding blood glucose result of 87mg/dl.
Lui states he will make resident on case aware. Will endorse to floor RN
Dr Blair notified. Dr Mae assess patient. said to give pain medicine.

## 2024-09-10 NOTE — PROGRESS NOTE ADULT - PROBLEM SELECTOR PLAN 1
IV ABX - zosyn per ID  ID eval with dr. lan appreciated  ? OM  ? MR  debridement per podiatry today  may need BKA

## 2024-09-10 NOTE — PRE-OP CHECKLIST - AS TEMP SITE
EEG preliminary read (not final) on the initial recording hour(s) = x 1     No seizures recorded.  Moderate slowing noted, nonspecific.    Final report to follow tomorrow morning after completion of study.    [facility] EEG Reading Room Ph#: [phone]  Epilepsy Answering Service after 5PM and before 8:30AM: Ph#: [phone] Palliative care social work note.    Bereavement call made to patients mother Heide. Support and resources offered. Updated mother Heide by phone.  Informed of poor prognosis.  Trying to get permission from police to allow visits. Palliative care social work note.     and palliative care physician Dr Lopez met with patients mother and son at bedside earlier today. Support offered and medical updates as well explained medical complexities with pressor support and needing dialysis. Mother wants everything to be done at present including full code. Son Darryl defers to patients mother for decision making and stated "we talk things over together:. Mother attempting to go to court tomorrow to ask for bail reduction form  and then make payment if possible to then remove barrier of custody. Family acknowledges that they have many family that wants to see patient but due to [facility]  there is daily review of visitation and restrictions that are reviewed. Patient now with worsening hypoxemia, unclear if due to cardiogenic pulmonary edema or ARDS.    Restarting diuretic, decreasing tidal volume - now at 5 cc/kg PBW, plateau around 30, driving pressure 15.  Started on paralytic.  Placed in prone position.    Total additional critical care time 55 min, time spent manually ventilating patient with ambu bag, adjusting mechanical ventilator settings, measuring plateau pressures, assessing flow-volume loops and pressure time curves, interpreting CXR and blood gases, placing patient in prone position, and adjusting other life sustaining measures.   Total CC time today 105 min oral

## 2024-09-11 DIAGNOSIS — D62 ACUTE POSTHEMORRHAGIC ANEMIA: ICD-10-CM

## 2024-09-11 LAB
GLUCOSE BLDC GLUCOMTR-MCNC: 157 MG/DL — HIGH (ref 70–99)
GLUCOSE BLDC GLUCOMTR-MCNC: 169 MG/DL — HIGH (ref 70–99)
GLUCOSE BLDC GLUCOMTR-MCNC: 181 MG/DL — HIGH (ref 70–99)
GLUCOSE BLDC GLUCOMTR-MCNC: 190 MG/DL — HIGH (ref 70–99)
GRAM STN FLD: ABNORMAL
GRAM STN FLD: ABNORMAL
HCT VFR BLD CALC: 23.2 % — LOW (ref 39–50)
HGB BLD-MCNC: 6.9 G/DL — CRITICAL LOW (ref 13–17)
SPECIMEN SOURCE: SIGNIFICANT CHANGE UP
SPECIMEN SOURCE: SIGNIFICANT CHANGE UP

## 2024-09-11 PROCEDURE — 99024 POSTOP FOLLOW-UP VISIT: CPT

## 2024-09-11 PROCEDURE — 99232 SBSQ HOSP IP/OBS MODERATE 35: CPT

## 2024-09-11 RX ORDER — SODIUM CHLORIDE 9 MG/ML
1000 INJECTION INTRAMUSCULAR; INTRAVENOUS; SUBCUTANEOUS ONCE
Refills: 0 | Status: COMPLETED | OUTPATIENT
Start: 2024-09-11 | End: 2024-09-11

## 2024-09-11 RX ADMIN — Medication 1: at 12:33

## 2024-09-11 RX ADMIN — Medication 1 APPLICATION(S): at 17:41

## 2024-09-11 RX ADMIN — Medication 100 MILLIGRAM(S): at 17:41

## 2024-09-11 RX ADMIN — DIGOXIN 125 MICROGRAM(S): 0.12 TABLET ORAL at 06:37

## 2024-09-11 RX ADMIN — METOPROLOL TARTRATE 12.5 MILLIGRAM(S): 100 TABLET ORAL at 12:12

## 2024-09-11 RX ADMIN — Medication 75 MILLILITER(S): at 04:06

## 2024-09-11 RX ADMIN — Medication 40 MILLIGRAM(S): at 06:43

## 2024-09-11 RX ADMIN — Medication 81 MILLIGRAM(S): at 12:12

## 2024-09-11 RX ADMIN — NALOXEGOL OXALATE 25 MILLIGRAM(S): 25 TABLET, FILM COATED ORAL at 12:13

## 2024-09-11 RX ADMIN — SULFAMETHOXAZOLE AND TRIMETHOPRIM 1 TABLET(S): 800; 160 TABLET ORAL at 02:15

## 2024-09-11 RX ADMIN — SUCRALFATE 1 GRAM(S): 1 SUSPENSION ORAL at 06:38

## 2024-09-11 RX ADMIN — OXYCODONE HYDROCHLORIDE 10 MILLIGRAM(S): 5 TABLET ORAL at 17:41

## 2024-09-11 RX ADMIN — METOPROLOL TARTRATE 12.5 MILLIGRAM(S): 100 TABLET ORAL at 17:40

## 2024-09-11 RX ADMIN — OXYCODONE HYDROCHLORIDE 10 MILLIGRAM(S): 5 TABLET ORAL at 06:38

## 2024-09-11 RX ADMIN — SUCRALFATE 1 GRAM(S): 1 SUSPENSION ORAL at 17:41

## 2024-09-11 RX ADMIN — Medication 325 MILLIGRAM(S): at 06:41

## 2024-09-11 RX ADMIN — Medication 500 MILLIGRAM(S): at 12:12

## 2024-09-11 RX ADMIN — SULFAMETHOXAZOLE AND TRIMETHOPRIM 1 TABLET(S): 800; 160 TABLET ORAL at 17:41

## 2024-09-11 RX ADMIN — Medication 2 TABLET(S): at 22:07

## 2024-09-11 RX ADMIN — HYDROMORPHONE HYDROCHLORIDE 1 MILLIGRAM(S): 2 TABLET ORAL at 04:05

## 2024-09-11 RX ADMIN — Medication 5 MILLIGRAM(S): at 22:07

## 2024-09-11 RX ADMIN — MIDODRINE HYDROCHLORIDE 15 MILLIGRAM(S): 5 TABLET ORAL at 17:41

## 2024-09-11 RX ADMIN — MIDODRINE HYDROCHLORIDE 15 MILLIGRAM(S): 5 TABLET ORAL at 06:38

## 2024-09-11 RX ADMIN — TIOTROPIUM BROMIDE AND OLODATEROL 2 PUFF(S): 3.124; 2.736 SPRAY, METERED RESPIRATORY (INHALATION) at 07:08

## 2024-09-11 RX ADMIN — MIDODRINE HYDROCHLORIDE 15 MILLIGRAM(S): 5 TABLET ORAL at 12:12

## 2024-09-11 RX ADMIN — OXYCODONE HYDROCHLORIDE 400 MILLIGRAM(S): 15 TABLET ORAL at 06:40

## 2024-09-11 RX ADMIN — Medication 100 MILLIGRAM(S): at 06:39

## 2024-09-11 RX ADMIN — Medication 10 MILLIGRAM(S): at 12:13

## 2024-09-11 RX ADMIN — Medication 1: at 08:43

## 2024-09-11 RX ADMIN — Medication 1 TABLET(S): at 12:12

## 2024-09-11 RX ADMIN — SULFAMETHOXAZOLE AND TRIMETHOPRIM 1 TABLET(S): 800; 160 TABLET ORAL at 06:37

## 2024-09-11 RX ADMIN — Medication 1: at 17:45

## 2024-09-11 RX ADMIN — FAMOTIDINE 20 MILLIGRAM(S): 10 INJECTION INTRAVENOUS at 12:12

## 2024-09-11 RX ADMIN — Medication 325 MILLIGRAM(S): at 17:41

## 2024-09-11 RX ADMIN — Medication 40 MILLIGRAM(S): at 22:07

## 2024-09-11 RX ADMIN — SODIUM CHLORIDE 1000 MILLILITER(S): 9 INJECTION INTRAMUSCULAR; INTRAVENOUS; SUBCUTANEOUS at 04:05

## 2024-09-11 RX ADMIN — Medication 1000 UNIT(S): at 12:11

## 2024-09-11 NOTE — CHART NOTE - NSCHARTNOTEFT_GEN_A_CORE
Called by RN as patient has BP of 73/41 manually. Patient is currently asymptomatic. Will give 1 PRBC unit that patient has ordered already and 1 L Bolus NS IV x1. Will recheck BP after. Rn to call with further changes.

## 2024-09-11 NOTE — PROGRESS NOTE ADULT - SUBJECTIVE AND OBJECTIVE BOX
54y year old Male seen at [facility] TELN 341 W1 s/p right foot partial calcanectomy and soft tissue debridement. Patient relates no overnight events and states that they are doing well at this time.  Denies any fever, chills, nausea, vomiting, chest pain, shortness of breath, or calf pain at this time.    Allergies    fish (Hives)  ertapenem (Blisters; Rash)    Intolerances        MEDICATIONS  (STANDING):  aMIOdarone    Tablet   Oral   aMIOdarone    Tablet 200 milliGRAM(s) Oral daily  ascorbic acid 500 milliGRAM(s) Oral daily  aspirin enteric coated 81 milliGRAM(s) Oral daily  atorvastatin 40 milliGRAM(s) Oral at bedtime  cetirizine 10 milliGRAM(s) Oral daily  cholecalciferol 1000 Unit(s) Oral daily  dextrose 5%. 1000 milliLiter(s) (50 mL/Hr) IV Continuous <Continuous>  dextrose 5%. 1000 milliLiter(s) (100 mL/Hr) IV Continuous <Continuous>  dextrose 50% Injectable 25 Gram(s) IV Push once  dextrose 50% Injectable 12.5 Gram(s) IV Push once  dextrose 50% Injectable 25 Gram(s) IV Push once  dextrose Oral Gel 15 Gram(s) Oral once  digoxin     Tablet 125 MICROGram(s) Oral daily  famotidine    Tablet 20 milliGRAM(s) Oral daily  ferrous    sulfate 325 milliGRAM(s) Oral two times a day  gabapentin 100 milliGRAM(s) Oral every 12 hours  glucagon  Injectable 1 milliGRAM(s) IntraMuscular once  insulin lispro (ADMELOG) corrective regimen sliding scale   SubCutaneous at bedtime  insulin lispro (ADMELOG) corrective regimen sliding scale   SubCutaneous three times a day before meals  lactated ringers. 1000 milliLiter(s) (75 mL/Hr) IV Continuous <Continuous>  melatonin 5 milliGRAM(s) Oral at bedtime  metoprolol tartrate 12.5 milliGRAM(s) Oral every 6 hours  midodrine. 15 milliGRAM(s) Oral three times a day  multivitamin 1 Tablet(s) Oral daily  mupirocin 2% Nasal 1 Application(s) Both Nostrils two times a day  naloxegol 25 milliGRAM(s) Oral daily  oxyCODONE    IR 10 milliGRAM(s) Oral two times a day  pantoprazole    Tablet 40 milliGRAM(s) Oral before breakfast  polyethylene glycol 3350 17 Gram(s) Oral daily  senna 2 Tablet(s) Oral at bedtime  sucralfate 1 Gram(s) Oral two times a day  tiotropium 2.5 MICROgram(s)/olodaterol 2.5 MICROgram(s) (STIOLTO) Inhaler 2 Puff(s) Inhalation daily  trimethoprim  160 mG/sulfamethoxazole 800 mG 1 Tablet(s) Oral two times a day    MEDICATIONS  (PRN):  acetaminophen     Tablet .. 650 milliGRAM(s) Oral every 6 hours PRN Temp greater or equal to 38C (100.4F)  HYDROmorphone  Injectable 1 milliGRAM(s) IV Push daily PRN Severe Pain (7 - 10)  sodium chloride 0.65% Nasal 1 Spray(s) Both Nostrils daily PRN Nasal Congestion      Vital Signs Last 24 Hrs  T(C): 36.3 (11 Sep 2024 03:00), Max: 36.8 (10 Sep 2024 13:10)  T(F): 97.3 (11 Sep 2024 03:00), Max: 98.2 (10 Sep 2024 13:10)  HR: 100 (11 Sep 2024 03:00) (77 - 112)  BP: 89/52 (11 Sep 2024 03:00) (81/54 - 130/79)  BP(mean): 84 (10 Sep 2024 14:10) (59 - 84)  RR: 18 (11 Sep 2024 03:00) (12 - 19)  SpO2: 96% (11 Sep 2024 03:00) (96% - 100%)    Parameters below as of 11 Sep 2024 03:00  Patient On (Oxygen Delivery Method): room air        PHYSICAL EXAM:  Vasc: DP PT pulses non-palpable b/l.  Neuro: Diminished protective sensation b/l  MSK: Healed TMA noted to left foot.   Dermatological: Surgical site of right foot noted to have mild winston-incision erythema, no proximal streaking, no fluctuance, no malodor, no new signs of infection at this time.                          7.2    x     )-----------( x        ( 10 Sep 2024 13:40 )             23.5                     Culture - Tissue with Gram Stain (collected 10 Sep 2024 12:30)  Source: Tissue Other, RIGHT FOOT SOFT TISSUE CULTURE  Gram Stain (11 Sep 2024 04:12):    No polymorphonuclear leukocytes seen per low power field    Numerous Gram Variable Rods seen per oil power field    Moderate Gram positive cocci in pairs seen per oil power field    Culture - Tissue with Gram Stain (collected 10 Sep 2024 12:30)  Source: Tissue Other, RIGHT FOOT CALCANEOUS BONE  Gram Stain (11 Sep 2024 04:13):    No polymorphonuclear leukocytes seen per low power field    Few Gram Negative Rods seen per oil power field    Rare Gram positive cocci in pairs seen per oil power field       54y year old Male seen at [facility] TELN 341 W1 s/p right foot partial calcanectomy and soft tissue debridement. Patient relates no overnight events and states that they are doing well at this time.  Denies any fever, chills, nausea, vomiting, chest pain, shortness of breath, or calf pain at this time.    Allergies    fish (Hives)  ertapenem (Blisters; Rash)    Intolerances        MEDICATIONS  (STANDING):  aMIOdarone    Tablet   Oral   aMIOdarone    Tablet 200 milliGRAM(s) Oral daily  ascorbic acid 500 milliGRAM(s) Oral daily  aspirin enteric coated 81 milliGRAM(s) Oral daily  atorvastatin 40 milliGRAM(s) Oral at bedtime  cetirizine 10 milliGRAM(s) Oral daily  cholecalciferol 1000 Unit(s) Oral daily  dextrose 5%. 1000 milliLiter(s) (50 mL/Hr) IV Continuous <Continuous>  dextrose 5%. 1000 milliLiter(s) (100 mL/Hr) IV Continuous <Continuous>  dextrose 50% Injectable 25 Gram(s) IV Push once  dextrose 50% Injectable 12.5 Gram(s) IV Push once  dextrose 50% Injectable 25 Gram(s) IV Push once  dextrose Oral Gel 15 Gram(s) Oral once  digoxin     Tablet 125 MICROGram(s) Oral daily  famotidine    Tablet 20 milliGRAM(s) Oral daily  ferrous    sulfate 325 milliGRAM(s) Oral two times a day  gabapentin 100 milliGRAM(s) Oral every 12 hours  glucagon  Injectable 1 milliGRAM(s) IntraMuscular once  insulin lispro (ADMELOG) corrective regimen sliding scale   SubCutaneous at bedtime  insulin lispro (ADMELOG) corrective regimen sliding scale   SubCutaneous three times a day before meals  lactated ringers. 1000 milliLiter(s) (75 mL/Hr) IV Continuous <Continuous>  melatonin 5 milliGRAM(s) Oral at bedtime  metoprolol tartrate 12.5 milliGRAM(s) Oral every 6 hours  midodrine. 15 milliGRAM(s) Oral three times a day  multivitamin 1 Tablet(s) Oral daily  mupirocin 2% Nasal 1 Application(s) Both Nostrils two times a day  naloxegol 25 milliGRAM(s) Oral daily  oxyCODONE    IR 10 milliGRAM(s) Oral two times a day  pantoprazole    Tablet 40 milliGRAM(s) Oral before breakfast  polyethylene glycol 3350 17 Gram(s) Oral daily  senna 2 Tablet(s) Oral at bedtime  sucralfate 1 Gram(s) Oral two times a day  tiotropium 2.5 MICROgram(s)/olodaterol 2.5 MICROgram(s) (STIOLTO) Inhaler 2 Puff(s) Inhalation daily  trimethoprim  160 mG/sulfamethoxazole 800 mG 1 Tablet(s) Oral two times a day    MEDICATIONS  (PRN):  acetaminophen     Tablet .. 650 milliGRAM(s) Oral every 6 hours PRN Temp greater or equal to 38C (100.4F)  HYDROmorphone  Injectable 1 milliGRAM(s) IV Push daily PRN Severe Pain (7 - 10)  sodium chloride 0.65% Nasal 1 Spray(s) Both Nostrils daily PRN Nasal Congestion      Vital Signs Last 24 Hrs  T(C): 36.3 (11 Sep 2024 03:00), Max: 36.8 (10 Sep 2024 13:10)  T(F): 97.3 (11 Sep 2024 03:00), Max: 98.2 (10 Sep 2024 13:10)  HR: 100 (11 Sep 2024 03:00) (77 - 112)  BP: 89/52 (11 Sep 2024 03:00) (81/54 - 130/79)  BP(mean): 84 (10 Sep 2024 14:10) (59 - 84)  RR: 18 (11 Sep 2024 03:00) (12 - 19)  SpO2: 96% (11 Sep 2024 03:00) (96% - 100%)    Parameters below as of 11 Sep 2024 03:00  Patient On (Oxygen Delivery Method): room air        PHYSICAL EXAM:  Vasc: DP PT pulses non-palpable b/l.  Neuro: Diminished protective sensation b/l  MSK: Healed TMA noted to left foot. s/p right partial calcanectomy with wound debridement   Dermatological: Surgical site of right foot noted to have mild winston-incision erythema, no proximal streaking, no fluctuance, no malodor,                           7.2    x     )-----------( x        ( 10 Sep 2024 13:40 )             23.5                     Culture - Tissue with Gram Stain (collected 10 Sep 2024 12:30)  Source: Tissue Other, RIGHT FOOT SOFT TISSUE CULTURE  Gram Stain (11 Sep 2024 04:12):    No polymorphonuclear leukocytes seen per low power field    Numerous Gram Variable Rods seen per oil power field    Moderate Gram positive cocci in pairs seen per oil power field    Culture - Tissue with Gram Stain (collected 10 Sep 2024 12:30)  Source: Tissue Other, RIGHT FOOT CALCANEOUS BONE  Gram Stain (11 Sep 2024 04:13):    No polymorphonuclear leukocytes seen per low power field    Few Gram Negative Rods seen per oil power field    Rare Gram positive cocci in pairs seen per oil power field

## 2024-09-11 NOTE — PROGRESS NOTE ADULT - ASSESSMENT
55 YO M with past medical history of AFib (on Eliquis), s/p cardiac arrest x 2 w/ AHRF (on recent admission), indwelling Aguilera, cerebral aneurysm, CAD (s/p stents), CVA, T2DM, HTN, OM (s/p debridement), PE, perforated gastric ulcer s/p ometnopexy 2019 with Dr. Mejia who presents with possible gangrenous right foot. In ED pt found to be in afib with RVR and hypotension.    Afib with RVR  - Likely, in the setting of septic shock from gangrenous foot.   - S/p Cardizem gtt and Dig load.  Remains in Afib with rates 100's-110's  - S/p Amiodarone PO load (5Gm).  Continue 200 mg PO qd  - Had a rate of 140's briefly post op, likely, reactive to pain  - Continue digoxin 125 pO QD.  Dig level = 1.1 (9/9)  - Continue metoprolol 12.5 QID but would increase to 25 mg q6H as BP tolerates for better rate-control.  Plan to switch to long-acting  - Continue telemonitoring for now  - Pt on Eliquis at home; held for Podia procedure  - Would continue to hold as Hgb trending down.  Today's = 7.2.  Would repeat and transfuse as needed to keep Hgb ~8    - TTE showed normal LVF with mod-severe MR  - No evidence of volume overload    - BP mostly stable and controlled with hypotension at 80's today.  Would repeat manually  - Continue Midodrine 15mg TID for bp support    - With known Hx of CAD.  EKG nonischemic  - No evidence of any active ischemia   - Continue ASA and statin.    - We recommended past admission (July, 2024) to continue both     - S/P partial calcanectomy of right foot, 9/10  - Follow Podia recs  - Abx per ID    - Monitor and replete lytes, keep K>4, Mg>2.  - Will continue to follow.    Henrietta Kim DNP, NP-C, AGACNP-C  Cardiology   Call TEAMS

## 2024-09-11 NOTE — PROVIDER CONTACT NOTE (CRITICAL VALUE NOTIFICATION) - ACTION/TREATMENT ORDERED:
Give 2 gram of magnesium IVPB x2 doses
awaiting orders
md aware and 1  unit of blood will be ordered

## 2024-09-11 NOTE — PROGRESS NOTE ADULT - SUBJECTIVE AND OBJECTIVE BOX
[facility] -- Brittany Lutz, Reynaldo Sweeney Savella, , Gera Maya  Office # [phone]    Follow Up: Cardiac Optimization    Subjective/Observations: Asleep but arousable.  Remains comfortable on RA.  Denies any form of respiratory or cardiac discomfort.  Denies postop pain at the moment    REVIEW OF SYSTEMS: All other review of systems is negative unless indicated above  PAST MEDICAL & SURGICAL HISTORY:  Diabetes      Diabetes mellitus with no complication      Afib      Hypertension      BPH (benign prostatic hyperplasia)      Perforated gastric ulcer  s/p emergent ex-lap omentopexy and plication 6/2019      Pulmonary embolism      History of non-ST elevation myocardial infarction (NSTEMI)      Osteomyelitis  s/p debridement      CAD S/P percutaneous coronary angioplasty      Cerebrovascular accident      H/O abdominal surgery      Perforated gastric ulcer      Traumatic amputation of left foot, initial encounter        MEDICATIONS  (STANDING):  aMIOdarone    Tablet   Oral   aMIOdarone    Tablet 200 milliGRAM(s) Oral daily  ascorbic acid 500 milliGRAM(s) Oral daily  aspirin enteric coated 81 milliGRAM(s) Oral daily  atorvastatin 40 milliGRAM(s) Oral at bedtime  cetirizine 10 milliGRAM(s) Oral daily  cholecalciferol 1000 Unit(s) Oral daily  dextrose 5%. 1000 milliLiter(s) (50 mL/Hr) IV Continuous <Continuous>  dextrose 5%. 1000 milliLiter(s) (100 mL/Hr) IV Continuous <Continuous>  dextrose 50% Injectable 25 Gram(s) IV Push once  dextrose 50% Injectable 12.5 Gram(s) IV Push once  dextrose 50% Injectable 25 Gram(s) IV Push once  dextrose Oral Gel 15 Gram(s) Oral once  digoxin     Tablet 125 MICROGram(s) Oral daily  famotidine    Tablet 20 milliGRAM(s) Oral daily  ferrous    sulfate 325 milliGRAM(s) Oral two times a day  gabapentin 100 milliGRAM(s) Oral every 12 hours  glucagon  Injectable 1 milliGRAM(s) IntraMuscular once  insulin lispro (ADMELOG) corrective regimen sliding scale   SubCutaneous three times a day before meals  insulin lispro (ADMELOG) corrective regimen sliding scale   SubCutaneous at bedtime  lactated ringers. 1000 milliLiter(s) (75 mL/Hr) IV Continuous <Continuous>  melatonin 5 milliGRAM(s) Oral at bedtime  metoprolol tartrate 12.5 milliGRAM(s) Oral every 6 hours  midodrine. 15 milliGRAM(s) Oral three times a day  multivitamin 1 Tablet(s) Oral daily  mupirocin 2% Nasal 1 Application(s) Both Nostrils two times a day  naloxegol 25 milliGRAM(s) Oral daily  oxyCODONE    IR 10 milliGRAM(s) Oral two times a day  pantoprazole    Tablet 40 milliGRAM(s) Oral before breakfast  polyethylene glycol 3350 17 Gram(s) Oral daily  senna 2 Tablet(s) Oral at bedtime  sucralfate 1 Gram(s) Oral two times a day  tiotropium 2.5 MICROgram(s)/olodaterol 2.5 MICROgram(s) (STIOLTO) Inhaler 2 Puff(s) Inhalation daily  trimethoprim  160 mG/sulfamethoxazole 800 mG 1 Tablet(s) Oral two times a day    MEDICATIONS  (PRN):  acetaminophen     Tablet .. 650 milliGRAM(s) Oral every 6 hours PRN Temp greater or equal to 38C (100.4F)  HYDROmorphone  Injectable 1 milliGRAM(s) IV Push daily PRN Severe Pain (7 - 10)  sodium chloride 0.65% Nasal 1 Spray(s) Both Nostrils daily PRN Nasal Congestion    Allergies    fish (Hives)  ertapenem (Blisters; Rash)    Intolerances      Vital Signs Last 24 Hrs  T(C): 36.3 (11 Sep 2024 03:00), Max: 36.8 (10 Sep 2024 13:10)  T(F): 97.3 (11 Sep 2024 03:00), Max: 98.2 (10 Sep 2024 13:10)  HR: 100 (11 Sep 2024 03:00) (77 - 112)  BP: 89/52 (11 Sep 2024 03:00) (81/54 - 130/79)  BP(mean): 84 (10 Sep 2024 14:10) (59 - 84)  RR: 18 (11 Sep 2024 03:00) (12 - 19)  SpO2: 96% (11 Sep 2024 03:00) (96% - 100%)    Parameters below as of 11 Sep 2024 03:00  Patient On (Oxygen Delivery Method): room air      I&O's Summary    10 Sep 2024 07:01  -  11 Sep 2024 07:00  --------------------------------------------------------  IN: 300 mL / OUT: 515 mL / NET: -215 mL    PHYSICAL EXAM:  TELE: Afib  Constitutional: NAD, asleep but arousable  HEENT: Moist Mucous Membranes, Anicteric  Pulmonary: Non-labored, breath sounds are clear bilaterally, No wheezing, rales or rhonchi  Cardiovascular: IRRR, S1 and S2, +murmurs, no rubs, gallops or clicks  Gastrointestinal: Bowel Sounds present, soft, nontender.   Lymph: Rt foot edema. No lymphadenopathy.  Skin: No visible rashes.  right foot ulcer with dressing with serosanguinous drainage +pallor  Psych:  Mood & affect: Flat     LABS: All Labs Reviewed:                        7.2    x     )-----------( x        ( 10 Sep 2024 13:40 )             23.5                         8.2    10.15 )-----------( 604      ( 09 Sep 2024 05:25 )             26.5     09 Sep 2024 05:25    140    |  108    |  7      ----------------------------<  82     3.6     |  25     |  0.68     Ca    7.8        09 Sep 2024 05:25  Phos  2.8       09 Sep 2024 05:25  Mg     2.1       09 Sep 2024 05:25  Mg     2.5       08 Sep 2024 14:45    TPro  5.2    /  Alb  1.3    /  TBili  0.4    /  DBili  x      /  AST  22     /  ALT  21     /  AlkPhos  164    09 Sep 2024 05:25              TRANSTHORACIC ECHOCARDIOGRAM REPORT  ________________________________________________________________________________                                      _______       Pt. Name:       SARAH MORRISON Study Date:    9/5/2024  MRN:            [id number]         YOB: 1970  Accession #:    [id number]        Age:           54 years  Account#:       [id number]       Gender:        M  Heart Rate:                      Height:        74.02 in (188.00 cm)  Rhythm:                          Weight:        207.23 lb (94.00 kg)  Blood Pressure: 97/60 mmHg       BSA/BMI:       2.21 m² / 26.60 kg/m²  ________________________________________________________________________________________  Referring Physician:    [id number] Hill Brizuela  Interpreting Physician: Claudette Jacobs  Primary Sonographer:    Mounika FELDMAN    CPT:               ECHO TTE WO CON COMP W DOPP - 09809.m  Indication(s):     Abnormal electrocardiogram ECG/EKG - R94.31  Procedure:         Transthoracic echocardiogram with2-D, M-mode and complete                     spectral and color flow Doppler.  Ordering Location: [facility]  Admission Status:  Inpatient  Study Information: Image quality for this study is technically difficult.    _______________________________________________________________________________________     CONCLUSIONS:      1. Technically difficult image quality.   2. Left ventricular cavity is normal in size. Left ventricular wall thickness is normal. Left ventricular systolic function is normal withan ejection fraction of 69 % by Castañeda's method of disks. There are no regional wall motion abnormalities seen.   3. There is increased LV mass and concentric hypertrophy.   4. Normal right ventricular cavity size and normal right ventricular systolic function.   5. Left atrium is severely dilated.   6. The right atrium is severely dilated.   7. There is calcification of the mitral valve annulus.   8. Mitral valve leaflets are diffusely calcified.   9. Moderate to severe mitral regurgitation.  10. Trileaflet aortic valve with normal systolic excursion. There is calcification of the aortic valve leaflets.  11. Mild tricuspid regurgitation.  12. Estimated pulmonary artery systolic pressure is 39 mmHg, consistent with borderline pulmonary hypertension.  13. The inferior vena cava is normal in size measuring 1.75 cm in diameter, (normal <2.1cm) with normal inspiratory collapse (normal >50%) consistent with normal right atrial pressure (~3, range 0-5mmHg).    ________________________________________________________________________________________  FINDINGS:     Left Ventricle:  The left ventricular cavity is normal in size. Left ventricular wall thickness is normal. Left ventricular systolic function is normal with a calculated ejection fraction of 69 % by the Castañeda's biplane method of disks. There are no regional wall motion abnormalities seen. There is increased LV mass and concentric hypertrophy. The left ventricular diastolic function is indeterminate.     Right Ventricle:  The right ventricular cavity is normal in size and right ventricular systolic function is normal.     Left Atrium:  The left atrium is severely dilated with an indexed volume of 31.95 ml/m².     Right Atrium:  The right atrium is severely dilated with an indexed volume of 26.74 ml/m².     Aortic Valve:  The aortic valve appears trileaflet with normal systolic excursion. There is calcification of the aortic valve leaflets.     Mitral Valve:  There is calcification of the mitral valve annulus. Mitral valve leaflets are diffusely calcified. There is moderate to severe mitral regurgitation.     Tricuspid Valve:  Structurally normal tricuspid valve with normal leaflet excursion. There is mild tricuspid regurgitation. Estimated pulmonary artery systolicpressure is 39 mmHg, consistent with borderline pulmonary hypertension.     Pulmonic Valve:  The pulmonic valve was not well visualized. There is trace pulmonic regurgitation.     Systemic Veins:  The inferior vena cava is normal in size measuring 1.75 cm in diameter, (normal <2.1cm) with normal inspiratory collapse (normal >50%) consistent with normal right atrial pressure (~3, range 0-5mmHg).  ____________________________________________________________________  QUANTITATIVE DATA:  Left Ventricle Measurements: (Indexed to BSA)     IVSd (2D):   1.4 cm  LVPWd (2D):  1.3 cm  LVIDd (2D):  5.4 cm  LVIDs (2D):  4.0 cm  LV Mass:     306 g  138.7 g/m²  LV Vol d, MOD A2C: 87.4 ml 39.62 ml/m²  LV Vol d, MOD A4C: 81.2 ml 36.78 ml/m²  LV Vol d, MOD BP: 92.3 ml 41.82 ml/m²  LV Vol s, MOD A2C: 29.0 ml 13.13 ml/m²  LV Vol s, MOD A4C: 28.3 ml 12.82 ml/m²  LV Vol s, MOD BP:  28.9 ml 13.08 ml/m²  LVOT SV MOD BP:    63.4 ml  LV EF% MOD BP:     69 %     MV E Vmax:    1.19 m/s  e' lateral:   13.10 cm/s  e'medial:    10.70 cm/s  E/e' lateral: 9.05  E/e' medial:  12.00  E/e' Average: 9.96  MV DT:        148 msec    Aorta Measurements: (Normal range) (Indexed to BSA)     Ao Root d 3.23 cm (3.1 - 3.7 cm) 1.46 cm/m²            Left Atrium Measurements: (Indexed to BSA)  LA Diam 2D: 4.72 cm    Right Atrial Measurements:     RA Vol:       59.00 ml  RA Vol Index: 26.74 ml/m²     LVOT / RVOT/ Qp/Qs Data: (Indexed to BSA)  LVOT Diameter: 2.29 cm  LVOT Area:     4.12 cm²    Mitral Valve Measurements:     MV E Vmax: 1.2 m/s  \  Tricuspid Valve Measurements:     TR Vmax:          3.0 m/s  TR Peak Gradient: 35.9 mmHg  RA Pressure:      3 mmHg  PASP:             39 mmHg    ________________________________________________________________________________________  Electronically signed on 9/6/2024 at 10:38:59 AM by Claudette Jacobs         *** Final ***      [facility] -- Brittany Lutz, Reynaldo Sweeney Savella, , Gera Maya  Office # [phone]    Follow Up: Cardiac Optimization    Subjective/Observations: Asleep but arousable.  Remains comfortable on RA.  Denies any form of respiratory or cardiac discomfort.  Denies postop pain at the moment    REVIEW OF SYSTEMS: All other review of systems is negative unless indicated above  PAST MEDICAL & SURGICAL HISTORY:  Diabetes      Diabetes mellitus with no complication      Afib      Hypertension      BPH (benign prostatic hyperplasia)      Perforated gastric ulcer  s/p emergent ex-lap omentopexy and plication 6/2019      Pulmonary embolism      History of non-ST elevation myocardial infarction (NSTEMI)      Osteomyelitis  s/p debridement      CAD S/P percutaneous coronary angioplasty      Cerebrovascular accident      H/O abdominal surgery      Perforated gastric ulcer      Traumatic amputation of left foot, initial encounter        MEDICATIONS  (STANDING):  aMIOdarone    Tablet   Oral   aMIOdarone    Tablet 200 milliGRAM(s) Oral daily  ascorbic acid 500 milliGRAM(s) Oral daily  aspirin enteric coated 81 milliGRAM(s) Oral daily  atorvastatin 40 milliGRAM(s) Oral at bedtime  cetirizine 10 milliGRAM(s) Oral daily  cholecalciferol 1000 Unit(s) Oral daily  dextrose 5%. 1000 milliLiter(s) (50 mL/Hr) IV Continuous <Continuous>  dextrose 5%. 1000 milliLiter(s) (100 mL/Hr) IV Continuous <Continuous>  dextrose 50% Injectable 25 Gram(s) IV Push once  dextrose 50% Injectable 12.5 Gram(s) IV Push once  dextrose 50% Injectable 25 Gram(s) IV Push once  dextrose Oral Gel 15 Gram(s) Oral once  digoxin     Tablet 125 MICROGram(s) Oral daily  famotidine    Tablet 20 milliGRAM(s) Oral daily  ferrous    sulfate 325 milliGRAM(s) Oral two times a day  gabapentin 100 milliGRAM(s) Oral every 12 hours  glucagon  Injectable 1 milliGRAM(s) IntraMuscular once  insulin lispro (ADMELOG) corrective regimen sliding scale   SubCutaneous three times a day before meals  insulin lispro (ADMELOG) corrective regimen sliding scale   SubCutaneous at bedtime  lactated ringers. 1000 milliLiter(s) (75 mL/Hr) IV Continuous <Continuous>  melatonin 5 milliGRAM(s) Oral at bedtime  metoprolol tartrate 12.5 milliGRAM(s) Oral every 6 hours  midodrine. 15 milliGRAM(s) Oral three times a day  multivitamin 1 Tablet(s) Oral daily  mupirocin 2% Nasal 1 Application(s) Both Nostrils two times a day  naloxegol 25 milliGRAM(s) Oral daily  oxyCODONE    IR 10 milliGRAM(s) Oral two times a day  pantoprazole    Tablet 40 milliGRAM(s) Oral before breakfast  polyethylene glycol 3350 17 Gram(s) Oral daily  senna 2 Tablet(s) Oral at bedtime  sucralfate 1 Gram(s) Oral two times a day  tiotropium 2.5 MICROgram(s)/olodaterol 2.5 MICROgram(s) (STIOLTO) Inhaler 2 Puff(s) Inhalation daily  trimethoprim  160 mG/sulfamethoxazole 800 mG 1 Tablet(s) Oral two times a day    MEDICATIONS  (PRN):  acetaminophen     Tablet .. 650 milliGRAM(s) Oral every 6 hours PRN Temp greater or equal to 38C (100.4F)  HYDROmorphone  Injectable 1 milliGRAM(s) IV Push daily PRN Severe Pain (7 - 10)  sodium chloride 0.65% Nasal 1 Spray(s) Both Nostrils daily PRN Nasal Congestion    Allergies    fish (Hives)  ertapenem (Blisters; Rash)    Intolerances      Vital Signs Last 24 Hrs  T(C): 36.3 (11 Sep 2024 03:00), Max: 36.8 (10 Sep 2024 13:10)  T(F): 97.3 (11 Sep 2024 03:00), Max: 98.2 (10 Sep 2024 13:10)  HR: 100 (11 Sep 2024 03:00) (77 - 112)  BP: 89/52 (11 Sep 2024 03:00) (81/54 - 130/79)  BP(mean): 84 (10 Sep 2024 14:10) (59 - 84)  RR: 18 (11 Sep 2024 03:00) (12 - 19)  SpO2: 96% (11 Sep 2024 03:00) (96% - 100%)    Parameters below as of 11 Sep 2024 03:00  Patient On (Oxygen Delivery Method): room air      I&O's Summary    10 Sep 2024 07:01  -  11 Sep 2024 07:00  --------------------------------------------------------  IN: 300 mL / OUT: 515 mL / NET: -215 mL    PHYSICAL EXAM:  TELE: Afib  Constitutional: NAD, asleep but arousable  HEENT: Moist Mucous Membranes, Anicteric  Pulmonary: Non-labored, breath sounds are clear bilaterally, No wheezing, rales or rhonchi  Cardiovascular: IRRR, S1 and S2, +murmurs, no rubs, gallops or clicks  Gastrointestinal: Bowel Sounds present, soft, nontender.   Lymph: Rt foot edema. No lymphadenopathy.  Skin: No visible rashes.  right foot ulcer with dressing with serosanguinous drainage +pallor  Psych:  Mood & affect: Flat     LABS: All Labs Reviewed:                        7.2    x     )-----------( x        ( 10 Sep 2024 13:40 )             23.5                         8.2    10.15 )-----------( 604      ( 09 Sep 2024 05:25 )             26.5     09 Sep 2024 05:25    140    |  108    |  7      ----------------------------<  82     3.6     |  25     |  0.68     Ca    7.8        09 Sep 2024 05:25  Phos  2.8       09 Sep 2024 05:25  Mg     2.1       09 Sep 2024 05:25  Mg     2.5       08 Sep 2024 14:45    TPro  5.2    /  Alb  1.3    /  TBili  0.4    /  DBili  x      /  AST  22     /  ALT  21     /  AlkPhos  164    09 Sep 2024 05:25              TRANSTHORACIC ECHOCARDIOGRAM REPORT  ________________________________________________________________________________                                      _______       Pt. Name:       SARAH MORRISON Study Date:    9/5/2024  MRN:            [id number]         YOB: 1970  Accession #:    [id number]        Age:           54 years  Account#:       [id number]       Gender:        M  Heart Rate:                      Height:        74.02 in (188.00 cm)  Rhythm:                          Weight:        207.23 lb (94.00 kg)  Blood Pressure: 97/60 mmHg       BSA/BMI:       2.21 m² / 26.60 kg/m²  ________________________________________________________________________________________  Referring Physician:    [id number] Hill Brizuela  Interpreting Physician: Claudette Jacobs  Primary Sonographer:    Mounika FELDMAN    CPT:               ECHO TTE WO CON COMP W DOPP - 38279.m  Indication(s):     Abnormal electrocardiogram ECG/EKG - R94.31  Procedure:         Transthoracic echocardiogram with2-D, M-mode and complete                     spectral and color flow Doppler.  Ordering Location: [facility]  Admission Status:  Inpatient  Study Information: Image quality for this study is technically difficult.    _______________________________________________________________________________________     CONCLUSIONS:      1. Technically difficult image quality.   2. Left ventricular cavity is normal in size. Left ventricular wall thickness is normal. Left ventricular systolic function is normal withan ejection fraction of 69 % by Castañeda's method of disks. There are no regional wall motion abnormalities seen.   3. There is increased LV mass and concentric hypertrophy.   4. Normal right ventricular cavity size and normal right ventricular systolic function.   5. Left atrium is severely dilated.   6. The right atrium is severely dilated.   7. There is calcification of the mitral valve annulus.   8. Mitral valve leaflets are diffusely calcified.   9. Moderate to severe mitral regurgitation.  10. Trileaflet aortic valve with normal systolic excursion. There is calcification of the aortic valve leaflets.  11. Mild tricuspid regurgitation.  12. Estimated pulmonary artery systolic pressure is 39 mmHg, consistent with borderline pulmonary hypertension.  13. The inferior vena cava is normal in size measuring 1.75 cm in diameter, (normal <2.1cm) with normal inspiratory collapse (normal >50%) consistent with normal right atrial pressure (~3, range 0-5mmHg).    ________________________________________________________________________________________  FINDINGS:     Left Ventricle:  The left ventricular cavity is normal in size. Left ventricular wall thickness is normal. Left ventricular systolic function is normal with a calculated ejection fraction of 69 % by the Castañeda's biplane method of disks. There are no regional wall motion abnormalities seen. There is increased LV mass and concentric hypertrophy. The left ventricular diastolic function is indeterminate.     Right Ventricle:  The right ventricular cavity is normal in size and right ventricular systolic function is normal.     Left Atrium:  The left atrium is severely dilated with an indexed volume of 31.95 ml/m².     Right Atrium:  The right atrium is severely dilated with an indexed volume of 26.74 ml/m².     Aortic Valve:  The aortic valve appears trileaflet with normal systolic excursion. There is calcification of the aortic valve leaflets.     Mitral Valve:  There is calcification of the mitral valve annulus. Mitral valve leaflets are diffusely calcified. There is moderate to severe mitral regurgitation.     Tricuspid Valve:  Structurally normal tricuspid valve with normal leaflet excursion. There is mild tricuspid regurgitation. Estimated pulmonary artery systolicpressure is 39 mmHg, consistent with borderline pulmonary hypertension.     Pulmonic Valve:  The pulmonic valve was not well visualized. There is trace pulmonic regurgitation.     Systemic Veins:  The inferior vena cava is normal in size measuring 1.75 cm in diameter, (normal <2.1cm) with normal inspiratory collapse (normal >50%) consistent with normal right atrial pressure (~3, range 0-5mmHg).  ____________________________________________________________________  QUANTITATIVE DATA:  Left Ventricle Measurements: (Indexed to BSA)     IVSd (2D):   1.4 cm  LVPWd (2D):  1.3 cm  LVIDd (2D):  5.4 cm  LVIDs (2D):  4.0 cm  LV Mass:     306 g  138.7 g/m²  LV Vol d, MOD A2C: 87.4 ml 39.62 ml/m²  LV Vol d, MOD A4C: 81.2 ml 36.78 ml/m²  LV Vol d, MOD BP: 92.3 ml 41.82 ml/m²  LV Vol s, MOD A2C: 29.0 ml 13.13 ml/m²  LV Vol s, MOD A4C: 28.3 ml 12.82 ml/m²  LV Vol s, MOD BP:  28.9 ml 13.08 ml/m²  LVOT SV MOD BP:    63.4 ml  LV EF% MOD BP:     69 %     MV E Vmax:    1.19 m/s  e' lateral:   13.10 cm/s  e'medial:    10.70 cm/s  E/e' lateral: 9.05  E/e' medial:  12.00  E/e' Average: 9.96  MV DT:        148 msec    Aorta Measurements: (Normal range) (Indexed to BSA)     Ao Root d 3.23 cm (3.1 - 3.7 cm) 1.46 cm/m²            Left Atrium Measurements: (Indexed to BSA)  LA Diam 2D: 4.72 cm    Right Atrial Measurements:     RA Vol:       59.00 ml  RA Vol Index: 26.74 ml/m²     LVOT / RVOT/ Qp/Qs Data: (Indexed to BSA)  LVOT Diameter: 2.29 cm  LVOT Area:     4.12 cm²    Mitral Valve Measurements:     MV E Vmax: 1.2 m/s  \  Tricuspid Valve Measurements:     TR Vmax:          3.0 m/s  TR Peak Gradient: 35.9 mmHg  RA Pressure:      3 mmHg  PASP:             39 mmHg    ________________________________________________________________________________________  Electronically signed on 9/6/2024 at 10:38:59 AM by Claudette Jacobs         *** Final ***

## 2024-09-11 NOTE — CASE MANAGEMENT PROGRESS NOTE - NSCMPROGRESSNOTE_GEN_ALL_CORE
Discussed pt in rounds, pt remains acute, HR elevated, BP low this am, receiving iVF, amiodarone. S/P partial calcanectomy of right foot, per RN site had some bleeding overnight.

## 2024-09-11 NOTE — PROGRESS NOTE ADULT - SUBJECTIVE AND OBJECTIVE BOX
[facility] INFECTIOUS DISEASE  Juventino Whitten MD PhD, Symone Hickey MD, Anne-Marie Li MD, Jeffery Jacobs MD, Ryan Romeo MD  and providing coverage with Melody Armstrong MD  Providing Infectious Disease Consultations at [facility], [facility], [facility]'s    Office# [phone] to schedule follow up appointments  Answering Service for urgent calls or New Consults [phone]  Cell# to text for urgent issues Juventino Whitten [phone]     infectious diseases progress note:    SARAH MORRISON is a 54y y. o. Male patient    Overnight and events of the last 24hrs reviewed    Allergies    fish (Hives)  ertapenem (Blisters; Rash)    Intolerances        ANTIBIOTICS/RELEVANT:  antimicrobials  trimethoprim  160 mG/sulfamethoxazole 800 mG 1 Tablet(s) Oral two times a day    immunologic:    OTHER:  acetaminophen     Tablet .. 650 milliGRAM(s) Oral every 6 hours PRN  aMIOdarone    Tablet   Oral   aMIOdarone    Tablet 200 milliGRAM(s) Oral daily  ascorbic acid 500 milliGRAM(s) Oral daily  aspirin enteric coated 81 milliGRAM(s) Oral daily  atorvastatin 40 milliGRAM(s) Oral at bedtime  cetirizine 10 milliGRAM(s) Oral daily  cholecalciferol 1000 Unit(s) Oral daily  dextrose 5%. 1000 milliLiter(s) IV Continuous <Continuous>  dextrose 5%. 1000 milliLiter(s) IV Continuous <Continuous>  dextrose 50% Injectable 25 Gram(s) IV Push once  dextrose 50% Injectable 25 Gram(s) IV Push once  dextrose 50% Injectable 12.5 Gram(s) IV Push once  dextrose Oral Gel 15 Gram(s) Oral once  digoxin     Tablet 125 MICROGram(s) Oral daily  famotidine    Tablet 20 milliGRAM(s) Oral daily  ferrous    sulfate 325 milliGRAM(s) Oral two times a day  gabapentin 100 milliGRAM(s) Oral every 12 hours  glucagon  Injectable 1 milliGRAM(s) IntraMuscular once  HYDROmorphone  Injectable 1 milliGRAM(s) IV Push daily PRN  insulin lispro (ADMELOG) corrective regimen sliding scale   SubCutaneous at bedtime  insulin lispro (ADMELOG) corrective regimen sliding scale   SubCutaneous three times a day before meals  lactated ringers. 1000 milliLiter(s) IV Continuous <Continuous>  melatonin 5 milliGRAM(s) Oral at bedtime  metoprolol tartrate 12.5 milliGRAM(s) Oral every 6 hours  midodrine. 15 milliGRAM(s) Oral three times a day  multivitamin 1 Tablet(s) Oral daily  mupirocin 2% Nasal 1 Application(s) Both Nostrils two times a day  naloxegol 25 milliGRAM(s) Oral daily  oxyCODONE    IR 10 milliGRAM(s) Oral two times a day  pantoprazole    Tablet 40 milliGRAM(s) Oral before breakfast  polyethylene glycol 3350 17 Gram(s) Oral daily  senna 2 Tablet(s) Oral at bedtime  sodium chloride 0.65% Nasal 1 Spray(s) Both Nostrils daily PRN  sucralfate 1 Gram(s) Oral two times a day  tiotropium 2.5 MICROgram(s)/olodaterol 2.5 MICROgram(s) (STIOLTO) Inhaler 2 Puff(s) Inhalation daily      Objective:  Vital Signs Last 24 Hrs  T(C): 36.3 (11 Sep 2024 03:00), Max: 36.8 (10 Sep 2024 13:10)  T(F): 97.3 (11 Sep 2024 03:00), Max: 98.2 (10 Sep 2024 13:10)  HR: 100 (11 Sep 2024 03:00) (77 - 112)  BP: 89/52 (11 Sep 2024 03:00) (81/54 - 130/79)  BP(mean): 84 (10 Sep 2024 14:10) (59 - 84)  RR: 18 (11 Sep 2024 03:00) (12 - 19)  SpO2: 96% (11 Sep 2024 03:00) (96% - 100%)    Parameters below as of 11 Sep 2024 03:00  Patient On (Oxygen Delivery Method): room air        T(C): 36.3 (09-11-24 @ 03:00), Max: 37.3 (09-09-24 @ 12:28)  T(C): 36.3 (09-11-24 @ 03:00), Max: 37.3 (09-09-24 @ 12:28)  T(C): 36.3 (09-11-24 @ 03:00), Max: 37.3 (09-09-24 @ 12:28)    PHYSICAL EXAM:  HEENT: NC atraumatic  Neck: supple  Respiratory: no accessory muscle use, breathing comfortably  Cardiovascular: distant  Gastrointestinal: normal appearing, nondistended  Extremities: no clubbing, no cyanosis,        LABS:                          7.2    x     )-----------( x        ( 10 Sep 2024 13:40 )             23.5       WBC  10.15 09-09 @ 05:25  11.70 09-08 @ 06:15  13.00 09-07 @ 06:20  10.96 09-06 @ 12:14  10.46 09-06 @ 05:46  19.41 09-05 @ 10:35              Creatinine: 0.68 mg/dL (09-09-24 @ 05:25)  Creatinine: 0.67 mg/dL (09-08-24 @ 06:15)  Creatinine: 0.63 mg/dL (09-07-24 @ 06:20)  Creatinine: 0.78 mg/dL (09-06-24 @ 05:46)  Creatinine: 1.20 mg/dL (09-05-24 @ 10:25)                INFLAMMATORY MARKERS      MICROBIOLOGY:    Culture - Tissue with Gram Stain (09.10.24 @ 12:30)    Gram Stain:   No polymorphonuclear leukocytes seen per low power field  Numerous Gram Variable Rods seen per oil power field  Moderate Gram positive cocci in pairs seen per oil power field   Specimen Source: Tissue Other, RIGHT FOOT SOFT TISSUE CULTURE    Culture - Wound Aerobic/Anaerobic (09.05.24 @ 12:55)    -  Piperacillin/Tazobactam: R <=8   -  Piperacillin/Tazobactam: S <=8 Treatment with Pipercillin/Tazobactam is not recommended in severe infections casued by Klebsiella aerogenes, Enterobacter cloacae complex, and Citrobacter freundii complex.   -  Piperacillin/Tazobactam: S <=8   -  Tobramycin: S <=2   -  Tobramycin: S <=2   -  Trimethoprim/Sulfamethoxazole: S <=0.5/9.5   -  Trimethoprim/Sulfamethoxazole: S <=0.5/9.5   -  Trimethoprim/Sulfamethoxazole: S <=0.5/9.5   -  Amoxicillin/Clavulanic Acid: R >16/8   -  Amoxicillin/Clavulanic Acid: R >16/8   -  Ampicillin: R >16 These ampicillin results predict results for amoxicillin   -  Ampicillin: R >16 These ampicillin results predict results for amoxicillin   -  Ampicillin: R >16 These ampicillin results predict results for amoxicillin   -  Ampicillin/Sulbactam: R >16/8   -  Ampicillin/Sulbactam: R 8/4   -  Ampicillin/Sulbactam: R >16/8   -  Aztreonam: S 8   -  Aztreonam: R >16   -  Aztreonam: S 8   -  Cefazolin: R >16   -  Cefazolin: R >16   -  Cefazolin: R >16   -  Cefepime: S <=2   -  Cefepime: R >16   -  Cefepime: S <=2   -  Cefoxitin: R >16   -  Cefoxitin: R >16   -  Ceftriaxone: R 32 Enterobacter cloacae, Klebsiella aerogenes, and Citrobacter freundii may develop resistance during prolonged therapy.   -  Ceftriaxone: R >32   -  Ceftriaxone: R 32   -  Ciprofloxacin: R >2   -  Ciprofloxacin: R >2   -  Ciprofloxacin: S <=0.25   -  Ertapenem: S <=0.5   -  Ertapenem: S <=0.5   -  Ertapenem: S <=0.5   -  Gentamicin: S <=2   -  Gentamicin: S <=2   -  Imipenem: S <=1   -  Imipenem: S <=1   -  Levofloxacin: R >4   -  Levofloxacin: S <=0.5   -  Levofloxacin: R >4   -  Meropenem: S <=1   -  Meropenem: S <=1   -  Meropenem: S <=1   Specimen Source: Swab Swab   Culture Results:   Moderate Escherichia coli ESBL  Few Citrobacter freundii  Rare Providencia stuartii  Few Corynebacterium striatum group "Susceptibilities not performed"   Organism Identification: Escherichia coli ESBL  Citrobacter freundii  Providencia stuartii   Organism: Escherichia coli ESBL   Organism: Citrobacter freundii   Organism: Providencia stuartii   Method Type: BAYLEE   Method Type: BAYLEE   Method Type: BAYLEE        RADIOLOGY & ADDITIONAL STUDIES:

## 2024-09-11 NOTE — PROGRESS NOTE ADULT - PROBLEM SELECTOR PLAN 1
Patient evaluated and chart reviewed.  POD#1 s/p right foot partial calcanectomy and soft tissue debridement.  Patient received 1 unit PRBC last night.  Mild strikethrough bleeding noted to bandages today, with mild bleeding noted upon dressing removal.  Reapplied dressings with Surgicel, iodoform packing, multiple ADB pads and paul, with moderate ACE bandage compression.  Repeat H+H ordered.  Surgical pathology pending.  Continue abx per ID recs.   Patient will continue to be monitored closely by podiatry.  Plan to be discussed with attending. Patient evaluated and chart reviewed.  POD#1 s/p right foot partial calcanectomy and soft tissue debridement.  Patient received 1 unit PRBC last night.  Mild strikethrough bleeding noted to bandages today, with mild bleeding noted upon dressing removal.  Reapplied dressings with Surgicel, iodoform packing, multiple ADB pads and paul, with moderate ACE bandage compression.  Repeat H+H ordered,   Surgical pathology pending.  Continue abx per ID recs.   Patient will continue to be monitored closely by podiatry.  Plan to be discussed with attending.

## 2024-09-11 NOTE — PROGRESS NOTE ADULT - ASSESSMENT
54m with dm, cad, pad, dvt atrial fib, hypertension, stroke, indwelling jacques, PE, indwelling jacques presents from wound care with  infected right heel ulcer  r/o osteomyelitis  leukocytosis   hypotension, atrial fib    Right Heel Osteomyelitis  Culture - Wound Aerobic/Anaerobic (09.05.24 @ 12:55):    Organism: Escherichia coli ESBL   Organism: Citrobacter freundii   Organism: Providencia stuartii  noted severe ertapenem reaction  surgery, 09/10/2024  right partial calcanectomy,  pt reports having metal stents that prevent MRI  Culture - Tissue with Gram Stain (09.10.24 @ 12:30)  Numerous Gram Variable Rods seen per oil power field  Moderate Gram positive cocci in pairs seen per oil power field  aztreonam--> changed to Bactrim 9/9-continue for now but noted new micro  PATH PENDING    Thank you for consulting us and involving us in the management of this most interesting and challenging case.  We will follow along in the care of this patient. Please call us at [phone] or text me directly on my cell# at [phone] with any concerns.

## 2024-09-11 NOTE — PROGRESS NOTE ADULT - SUBJECTIVE AND OBJECTIVE BOX
Patient is a 54y old  Male who presents with a chief complaint of AF (11 Sep 2024 11:31)      INTERVAL HPI/OVERNIGHT EVENTS:  T(C): 36.9 (09-11-24 @ 11:33), Max: 36.9 (09-11-24 @ 11:33)  HR: 110 (09-11-24 @ 11:33) (77 - 112)  BP: 101/62 (09-11-24 @ 11:33) (89/52 - 130/79)  RR: 19 (09-11-24 @ 11:33) (12 - 19)  SpO2: 98% (09-11-24 @ 11:33) (96% - 100%)  Wt(kg): --  I&O's Summary    10 Sep 2024 07:01  -  11 Sep 2024 07:00  --------------------------------------------------------  IN: 300 mL / OUT: 515 mL / NET: -215 mL        LABS:                        6.9    x     )-----------( x        ( 11 Sep 2024 11:38 )             23.2               CAPILLARY BLOOD GLUCOSE      POCT Blood Glucose.: 157 mg/dL (11 Sep 2024 12:15)  POCT Blood Glucose.: 181 mg/dL (11 Sep 2024 08:16)  POCT Blood Glucose.: 168 mg/dL (10 Sep 2024 21:28)  POCT Blood Glucose.: 134 mg/dL (10 Sep 2024 16:58)            MEDICATIONS  (STANDING):  aMIOdarone    Tablet 200 milliGRAM(s) Oral daily  aMIOdarone    Tablet   Oral   ascorbic acid 500 milliGRAM(s) Oral daily  aspirin enteric coated 81 milliGRAM(s) Oral daily  atorvastatin 40 milliGRAM(s) Oral at bedtime  cetirizine 10 milliGRAM(s) Oral daily  cholecalciferol 1000 Unit(s) Oral daily  dextrose 5%. 1000 milliLiter(s) (100 mL/Hr) IV Continuous <Continuous>  dextrose 5%. 1000 milliLiter(s) (50 mL/Hr) IV Continuous <Continuous>  dextrose 50% Injectable 12.5 Gram(s) IV Push once  dextrose 50% Injectable 25 Gram(s) IV Push once  dextrose 50% Injectable 25 Gram(s) IV Push once  dextrose Oral Gel 15 Gram(s) Oral once  digoxin     Tablet 125 MICROGram(s) Oral daily  famotidine    Tablet 20 milliGRAM(s) Oral daily  ferrous    sulfate 325 milliGRAM(s) Oral two times a day  gabapentin 100 milliGRAM(s) Oral every 12 hours  glucagon  Injectable 1 milliGRAM(s) IntraMuscular once  insulin lispro (ADMELOG) corrective regimen sliding scale   SubCutaneous three times a day before meals  insulin lispro (ADMELOG) corrective regimen sliding scale   SubCutaneous at bedtime  lactated ringers. 1000 milliLiter(s) (75 mL/Hr) IV Continuous <Continuous>  melatonin 5 milliGRAM(s) Oral at bedtime  metoprolol tartrate 12.5 milliGRAM(s) Oral every 6 hours  midodrine. 15 milliGRAM(s) Oral three times a day  multivitamin 1 Tablet(s) Oral daily  mupirocin 2% Nasal 1 Application(s) Both Nostrils two times a day  naloxegol 25 milliGRAM(s) Oral daily  oxyCODONE    IR 10 milliGRAM(s) Oral two times a day  pantoprazole    Tablet 40 milliGRAM(s) Oral before breakfast  polyethylene glycol 3350 17 Gram(s) Oral daily  senna 2 Tablet(s) Oral at bedtime  sucralfate 1 Gram(s) Oral two times a day  tiotropium 2.5 MICROgram(s)/olodaterol 2.5 MICROgram(s) (STIOLTO) Inhaler 2 Puff(s) Inhalation daily  trimethoprim  160 mG/sulfamethoxazole 800 mG 1 Tablet(s) Oral two times a day    MEDICATIONS  (PRN):  acetaminophen     Tablet .. 650 milliGRAM(s) Oral every 6 hours PRN Temp greater or equal to 38C (100.4F)  HYDROmorphone  Injectable 1 milliGRAM(s) IV Push daily PRN Severe Pain (7 - 10)  sodium chloride 0.65% Nasal 1 Spray(s) Both Nostrils daily PRN Nasal Congestion      REVIEW OF SYSTEMS:  CONSTITUTIONAL: No fever, weight loss, or fatigue  EYES: No eye pain, visual disturbances, or discharge  ENMT:  No difficulty hearing, tinnitus, vertigo; No sinus or throat pain  NECK: No pain or stiffness  RESPIRATORY: No cough, wheezing, chills or hemoptysis; No shortness of breath  CARDIOVASCULAR: No chest pain, palpitations, dizziness, or leg swelling  GASTROINTESTINAL: No abdominal or epigastric pain. No nausea, vomiting, or hematemesis; No diarrhea or constipation. No melena or hematochezia.  GENITOURINARY: No dysuria, frequency, hematuria, or incontinence  NEUROLOGICAL: No headaches, memory loss, loss of strength, numbness, or tremors  SKIN: No itching, burning, rashes, or lesions   LYMPH NODES: No enlarged glands  ENDOCRINE: No heat or cold intolerance; No hair loss  MUSCULOSKELETAL: No joint pain or swelling; No muscle, back, or extremity pain  PSYCHIATRIC: No depression, anxiety, mood swings, or difficulty sleeping  HEME/LYMPH: No easy bruising, or bleeding gums  ALLERY AND IMMUNOLOGIC: No hives or eczema    RADIOLOGY & ADDITIONAL TESTS:    Imaging Personally Reviewed:  [x ] YES  [ ] NO    Consultant(s) Notes Reviewed:  [ x] YES  [ ] NO    PHYSICAL EXAM:  GENERAL: NAD, well-groomed, well-developed  HEAD:  Atraumatic, Normocephalic  EYES: EOMI, PERRLA, conjunctiva and sclera clear  ENMT: No tonsillar erythema, exudates, or enlargement; Moist mucous membranes, Good dentition, No lesions  NECK: Supple, No JVD, Normal thyroid  NERVOUS SYSTEM:  Alert & Oriented X3, Good concentration; Motor Strength 5/5 B/L upper and lower extremities; DTRs 2+ intact and symmetric  CHEST/LUNG: Clear to percussion bilaterally; No rales, rhonchi, wheezing, or rubs  HEART: Regular rate and rhythm; No murmurs, rubs, or gallops  ABDOMEN: Soft, Nontender, Nondistended; Bowel sounds present  EXTREMITIES:  2+ Peripheral Pulses, No clubbing, cyanosis, or edema  LYMPH: No lymphadenopathy noted  SKIN: No rashes or lesions    Care Discussed with Consultants/Other Providers [x ] YES  [ ] NO    advance care planning and advance directives discussed, including but not limited to long term care planning, and all documents reviewed [x]YES  [ ]NO  family caregiver training provided [x]YES  [ ]NO  time spent greater than 45min

## 2024-09-12 DIAGNOSIS — I25.10 ATHEROSCLEROTIC HEART DISEASE OF NATIVE CORONARY ARTERY WITHOUT ANGINA PECTORIS: ICD-10-CM

## 2024-09-12 LAB
-  AMOXICILLIN/CLAVULANIC ACID: SIGNIFICANT CHANGE UP
-  AMPICILLIN/SULBACTAM: SIGNIFICANT CHANGE UP
-  AMPICILLIN: SIGNIFICANT CHANGE UP
-  AZTREONAM: SIGNIFICANT CHANGE UP
-  CEFAZOLIN: SIGNIFICANT CHANGE UP
-  CEFEPIME: SIGNIFICANT CHANGE UP
-  CEFOXITIN: SIGNIFICANT CHANGE UP
-  CEFTRIAXONE: SIGNIFICANT CHANGE UP
-  CIPROFLOXACIN: SIGNIFICANT CHANGE UP
-  CLINDAMYCIN: SIGNIFICANT CHANGE UP
-  DAPTOMYCIN: SIGNIFICANT CHANGE UP
-  ERTAPENEM: SIGNIFICANT CHANGE UP
-  ERYTHROMYCIN: SIGNIFICANT CHANGE UP
-  GENTAMICIN: SIGNIFICANT CHANGE UP
-  IMIPENEM: SIGNIFICANT CHANGE UP
-  IMIPENEM: SIGNIFICANT CHANGE UP
-  LEVOFLOXACIN: SIGNIFICANT CHANGE UP
-  LINEZOLID: SIGNIFICANT CHANGE UP
-  MEROPENEM: SIGNIFICANT CHANGE UP
-  OXACILLIN: SIGNIFICANT CHANGE UP
-  PENICILLIN: SIGNIFICANT CHANGE UP
-  PIPERACILLIN/TAZOBACTAM: SIGNIFICANT CHANGE UP
-  RIFAMPIN: SIGNIFICANT CHANGE UP
-  TETRACYCLINE: SIGNIFICANT CHANGE UP
-  TOBRAMYCIN: SIGNIFICANT CHANGE UP
-  TOBRAMYCIN: SIGNIFICANT CHANGE UP
-  TRIMETHOPRIM/SULFAMETHOXAZOLE: SIGNIFICANT CHANGE UP
-  VANCOMYCIN: SIGNIFICANT CHANGE UP
ANION GAP SERPL CALC-SCNC: 6 MMOL/L — SIGNIFICANT CHANGE UP (ref 5–17)
BUN SERPL-MCNC: 14 MG/DL — SIGNIFICANT CHANGE UP (ref 7–23)
CALCIUM SERPL-MCNC: 7.7 MG/DL — LOW (ref 8.5–10.1)
CHLORIDE SERPL-SCNC: 111 MMOL/L — HIGH (ref 96–108)
CO2 SERPL-SCNC: 25 MMOL/L — SIGNIFICANT CHANGE UP (ref 22–31)
CREAT SERPL-MCNC: 1.1 MG/DL — SIGNIFICANT CHANGE UP (ref 0.5–1.3)
EGFR: 79 ML/MIN/1.73M2 — SIGNIFICANT CHANGE UP
GLUCOSE BLDC GLUCOMTR-MCNC: 134 MG/DL — HIGH (ref 70–99)
GLUCOSE BLDC GLUCOMTR-MCNC: 141 MG/DL — HIGH (ref 70–99)
GLUCOSE BLDC GLUCOMTR-MCNC: 144 MG/DL — HIGH (ref 70–99)
GLUCOSE BLDC GLUCOMTR-MCNC: 173 MG/DL — HIGH (ref 70–99)
GLUCOSE SERPL-MCNC: 114 MG/DL — HIGH (ref 70–99)
HCT VFR BLD CALC: 22.4 % — LOW (ref 39–50)
HGB BLD-MCNC: 7.3 G/DL — LOW (ref 13–17)
MAGNESIUM SERPL-MCNC: 1.8 MG/DL — SIGNIFICANT CHANGE UP (ref 1.6–2.6)
MCHC RBC-ENTMCNC: 30.3 PG — SIGNIFICANT CHANGE UP (ref 27–34)
MCHC RBC-ENTMCNC: 32.6 GM/DL — SIGNIFICANT CHANGE UP (ref 32–36)
MCV RBC AUTO: 92.9 FL — SIGNIFICANT CHANGE UP (ref 80–100)
METHOD TYPE: SIGNIFICANT CHANGE UP
NRBC # BLD: 0 /100 WBCS — SIGNIFICANT CHANGE UP (ref 0–0)
PLATELET # BLD AUTO: 418 K/UL — HIGH (ref 150–400)
POTASSIUM SERPL-MCNC: 4.1 MMOL/L — SIGNIFICANT CHANGE UP (ref 3.5–5.3)
POTASSIUM SERPL-SCNC: 4.1 MMOL/L — SIGNIFICANT CHANGE UP (ref 3.5–5.3)
RBC # BLD: 2.41 M/UL — LOW (ref 4.2–5.8)
RBC # FLD: 18.5 % — HIGH (ref 10.3–14.5)
SODIUM SERPL-SCNC: 142 MMOL/L — SIGNIFICANT CHANGE UP (ref 135–145)
WBC # BLD: 10.01 K/UL — SIGNIFICANT CHANGE UP (ref 3.8–10.5)
WBC # FLD AUTO: 10.01 K/UL — SIGNIFICANT CHANGE UP (ref 3.8–10.5)

## 2024-09-12 PROCEDURE — 99232 SBSQ HOSP IP/OBS MODERATE 35: CPT

## 2024-09-12 RX ORDER — OXYCODONE HYDROCHLORIDE 5 MG/1
10 TABLET ORAL EVERY 12 HOURS
Refills: 0 | Status: DISCONTINUED | OUTPATIENT
Start: 2024-09-12 | End: 2024-09-18

## 2024-09-12 RX ORDER — TRAMADOL HYDROCHLORIDE 200 MG/1
50 TABLET, EXTENDED RELEASE ORAL EVERY 4 HOURS
Refills: 0 | Status: DISCONTINUED | OUTPATIENT
Start: 2024-09-12 | End: 2024-09-18

## 2024-09-12 RX ORDER — GABAPENTIN 100 MG
100 CAPSULE ORAL THREE TIMES A DAY
Refills: 0 | Status: DISCONTINUED | OUTPATIENT
Start: 2024-09-12 | End: 2024-09-18

## 2024-09-12 RX ORDER — OXYCODONE HYDROCHLORIDE 5 MG/1
2.5 TABLET ORAL EVERY 4 HOURS
Refills: 0 | Status: DISCONTINUED | OUTPATIENT
Start: 2024-09-12 | End: 2024-09-18

## 2024-09-12 RX ADMIN — FAMOTIDINE 20 MILLIGRAM(S): 10 INJECTION INTRAVENOUS at 12:20

## 2024-09-12 RX ADMIN — Medication 1 APPLICATION(S): at 06:11

## 2024-09-12 RX ADMIN — MIDODRINE HYDROCHLORIDE 15 MILLIGRAM(S): 5 TABLET ORAL at 06:10

## 2024-09-12 RX ADMIN — OXYCODONE HYDROCHLORIDE 10 MILLIGRAM(S): 5 TABLET ORAL at 07:00

## 2024-09-12 RX ADMIN — Medication 325 MILLIGRAM(S): at 17:21

## 2024-09-12 RX ADMIN — HYDROMORPHONE HYDROCHLORIDE 1 MILLIGRAM(S): 2 TABLET ORAL at 12:19

## 2024-09-12 RX ADMIN — MIDODRINE HYDROCHLORIDE 15 MILLIGRAM(S): 5 TABLET ORAL at 17:21

## 2024-09-12 RX ADMIN — Medication 1: at 17:22

## 2024-09-12 RX ADMIN — METOPROLOL TARTRATE 12.5 MILLIGRAM(S): 100 TABLET ORAL at 06:11

## 2024-09-12 RX ADMIN — METOPROLOL TARTRATE 12.5 MILLIGRAM(S): 100 TABLET ORAL at 17:21

## 2024-09-12 RX ADMIN — Medication 100 MILLIGRAM(S): at 06:10

## 2024-09-12 RX ADMIN — OXYCODONE HYDROCHLORIDE 200 MILLIGRAM(S): 15 TABLET ORAL at 06:10

## 2024-09-12 RX ADMIN — Medication 325 MILLIGRAM(S): at 06:09

## 2024-09-12 RX ADMIN — SUCRALFATE 1 GRAM(S): 1 SUSPENSION ORAL at 17:21

## 2024-09-12 RX ADMIN — SULFAMETHOXAZOLE AND TRIMETHOPRIM 1 TABLET(S): 800; 160 TABLET ORAL at 06:09

## 2024-09-12 RX ADMIN — Medication 1 TABLET(S): at 12:20

## 2024-09-12 RX ADMIN — Medication 40 MILLIGRAM(S): at 06:10

## 2024-09-12 RX ADMIN — Medication 40 MILLIGRAM(S): at 21:49

## 2024-09-12 RX ADMIN — OXYCODONE HYDROCHLORIDE 10 MILLIGRAM(S): 5 TABLET ORAL at 17:21

## 2024-09-12 RX ADMIN — Medication 100 MILLIGRAM(S): at 17:21

## 2024-09-12 RX ADMIN — Medication 2 TABLET(S): at 21:49

## 2024-09-12 RX ADMIN — Medication 10 MILLIGRAM(S): at 12:20

## 2024-09-12 RX ADMIN — Medication 5 MILLIGRAM(S): at 21:50

## 2024-09-12 RX ADMIN — Medication 1000 UNIT(S): at 12:20

## 2024-09-12 RX ADMIN — OXYCODONE HYDROCHLORIDE 10 MILLIGRAM(S): 5 TABLET ORAL at 06:10

## 2024-09-12 RX ADMIN — Medication 1 APPLICATION(S): at 17:22

## 2024-09-12 RX ADMIN — OXYCODONE HYDROCHLORIDE 2.5 MILLIGRAM(S): 5 TABLET ORAL at 23:16

## 2024-09-12 RX ADMIN — TIOTROPIUM BROMIDE AND OLODATEROL 2 PUFF(S): 3.124; 2.736 SPRAY, METERED RESPIRATORY (INHALATION) at 07:01

## 2024-09-12 RX ADMIN — OXYCODONE HYDROCHLORIDE 2.5 MILLIGRAM(S): 5 TABLET ORAL at 22:16

## 2024-09-12 RX ADMIN — MIDODRINE HYDROCHLORIDE 15 MILLIGRAM(S): 5 TABLET ORAL at 12:20

## 2024-09-12 RX ADMIN — DIGOXIN 125 MICROGRAM(S): 0.12 TABLET ORAL at 06:11

## 2024-09-12 RX ADMIN — Medication 500 MILLIGRAM(S): at 12:20

## 2024-09-12 RX ADMIN — Medication 100 MILLIGRAM(S): at 21:50

## 2024-09-12 RX ADMIN — SULFAMETHOXAZOLE AND TRIMETHOPRIM 1 TABLET(S): 800; 160 TABLET ORAL at 17:21

## 2024-09-12 RX ADMIN — Medication 81 MILLIGRAM(S): at 12:20

## 2024-09-12 RX ADMIN — NALOXEGOL OXALATE 25 MILLIGRAM(S): 25 TABLET, FILM COATED ORAL at 12:21

## 2024-09-12 RX ADMIN — SUCRALFATE 1 GRAM(S): 1 SUSPENSION ORAL at 06:10

## 2024-09-12 RX ADMIN — METOPROLOL TARTRATE 12.5 MILLIGRAM(S): 100 TABLET ORAL at 12:19

## 2024-09-12 NOTE — PROGRESS NOTE ADULT - ASSESSMENT
54m with dm, cad, pad, dvt atrial fib, hypertension, stroke, indwelling jacques, PE, indwelling jacques presents from wound care with  infected right heel ulcer  r/o osteomyelitis  leukocytosis   hypotension, atrial fib    Right Heel Osteomyelitis  Culture - Wound Aerobic/Anaerobic (09.05.24 @ 12:55):    Organism: Escherichia coli ESBL   Organism: Citrobacter freundii   Organism: Providencia stuartii  noted severe ertapenem reaction  surgery, 09/10/2024  right partial calcanectomy,  pt reports having metal stents that prevent MRI  Culture - Tissue with Gram Stain (09.10.24 @ 12:30)  Moderate Staphylococcus aureus  aztreonam--> changed to Bactrim 9/9-continue for now (discussed w pt that I am cautious regarding abx with prior issues)  PATH PENDING    Thank you for consulting us and involving us in the management of this most interesting and challenging case.  We will follow along in the care of this patient. Please call us at [phone] or text me directly on my cell# at [phone] with any concerns.

## 2024-09-12 NOTE — PROGRESS NOTE ADULT - SUBJECTIVE AND OBJECTIVE BOX
Date of Service 09-12-24 @ 15:28    Patient is a 54y old  Male who presents with a chief complaint of AF (12 Sep 2024 12:58)      INTERVAL /OVERNIGHT EVENTS: c/o right heel pain    MEDICATIONS  (STANDING):  aMIOdarone    Tablet 200 milliGRAM(s) Oral daily  aMIOdarone    Tablet   Oral   ascorbic acid 500 milliGRAM(s) Oral daily  aspirin enteric coated 81 milliGRAM(s) Oral daily  atorvastatin 40 milliGRAM(s) Oral at bedtime  cetirizine 10 milliGRAM(s) Oral daily  cholecalciferol 1000 Unit(s) Oral daily  dextrose 5%. 1000 milliLiter(s) (100 mL/Hr) IV Continuous <Continuous>  dextrose 5%. 1000 milliLiter(s) (50 mL/Hr) IV Continuous <Continuous>  dextrose 50% Injectable 25 Gram(s) IV Push once  dextrose 50% Injectable 25 Gram(s) IV Push once  dextrose 50% Injectable 12.5 Gram(s) IV Push once  dextrose Oral Gel 15 Gram(s) Oral once  digoxin     Tablet 125 MICROGram(s) Oral daily  famotidine    Tablet 20 milliGRAM(s) Oral daily  ferrous    sulfate 325 milliGRAM(s) Oral two times a day  gabapentin 100 milliGRAM(s) Oral three times a day  glucagon  Injectable 1 milliGRAM(s) IntraMuscular once  insulin lispro (ADMELOG) corrective regimen sliding scale   SubCutaneous at bedtime  insulin lispro (ADMELOG) corrective regimen sliding scale   SubCutaneous three times a day before meals  melatonin 5 milliGRAM(s) Oral at bedtime  metoprolol tartrate 12.5 milliGRAM(s) Oral every 6 hours  midodrine. 15 milliGRAM(s) Oral three times a day  multivitamin 1 Tablet(s) Oral daily  mupirocin 2% Nasal 1 Application(s) Both Nostrils two times a day  naloxegol 25 milliGRAM(s) Oral daily  oxyCODONE  ER Tablet 10 milliGRAM(s) Oral every 12 hours  pantoprazole    Tablet 40 milliGRAM(s) Oral before breakfast  polyethylene glycol 3350 17 Gram(s) Oral daily  senna 2 Tablet(s) Oral at bedtime  sucralfate 1 Gram(s) Oral two times a day  tiotropium 2.5 MICROgram(s)/olodaterol 2.5 MICROgram(s) (STIOLTO) Inhaler 2 Puff(s) Inhalation daily  trimethoprim  160 mG/sulfamethoxazole 800 mG 1 Tablet(s) Oral two times a day    MEDICATIONS  (PRN):  acetaminophen     Tablet .. 650 milliGRAM(s) Oral every 6 hours PRN Temp greater or equal to 38C (100.4F)  HYDROmorphone  Injectable 1 milliGRAM(s) IV Push daily PRN Severe Pain (7 - 10)  oxyCODONE    IR 2.5 milliGRAM(s) Oral every 4 hours PRN Moderate Pain (4 - 6)  sodium chloride 0.65% Nasal 1 Spray(s) Both Nostrils daily PRN Nasal Congestion  traMADol 50 milliGRAM(s) Oral every 4 hours PRN Mild Pain (1 - 3)      Allergies    fish (Hives)  ertapenem (Blisters; Rash)    Intolerances        REVIEW OF SYSTEMS:  CONSTITUTIONAL: No fever, weight loss, or fatigue  EYES: No eye pain, visual disturbances, or discharge  ENMT:  No difficulty hearing, tinnitus, vertigo; No sinus or throat pain  NECK: No pain or stiffness  RESPIRATORY: No cough, wheezing, chills or hemoptysis; No shortness of breath  CARDIOVASCULAR: No chest pain, palpitations, dizziness, or leg swelling  GASTROINTESTINAL: No abdominal or epigastric pain. No nausea, vomiting, or hematemesis; No diarrhea or constipation. No melena or hematochezia.  GENITOURINARY: No dysuria, frequency, hematuria, or incontinence  NEUROLOGICAL: No headaches, memory loss, loss of strength, numbness, or tremors  SKIN: No itching, burning, rashes, or lesions   LYMPH NODES: No enlarged glands  ENDOCRINE: No heat or cold intolerance; No hair loss; No polydipsia or polyuria  MUSCULOSKELETAL: No joint pain or swelling; No muscle, back, or extremity pain  PSYCHIATRIC: No depression, anxiety, mood swings, or difficulty sleeping  HEME/LYMPH: No easy bruising, or bleeding gums  ALLERGY AND IMMUNOLOGIC: No hives or eczema    Vital Signs Last 24 Hrs  T(C): 36.4 (12 Sep 2024 06:10), Max: 36.9 (11 Sep 2024 20:55)  T(F): 97.6 (12 Sep 2024 06:10), Max: 98.4 (11 Sep 2024 20:55)  HR: 98 (12 Sep 2024 06:10) (58 - 98)  BP: 102/69 (12 Sep 2024 06:10) (102/61 - 105/67)  BP(mean): --  RR: 18 (12 Sep 2024 06:10) (18 - 18)  SpO2: 95% (12 Sep 2024 06:10) (95% - 95%)    Parameters below as of 11 Sep 2024 20:55  Patient On (Oxygen Delivery Method): room air        PHYSICAL EXAM:  GENERAL: NAD, well-groomed, well-developed  HEAD:  Atraumatic, Normocephalic  EYES: EOMI, PERRLA, conjunctiva and sclera clear  ENMT: No tonsillar erythema, exudates, or enlargement; Moist mucous membranes, Good dentition, No lesions  NECK: Supple, No JVD, Normal thyroid  NERVOUS SYSTEM:  Alert & Oriented X3, Good concentration; Motor Strength 5/5 B/L upper and lower extremities; DTRs 2+ intact and symmetric  CHEST/LUNG: Clear to auscultation bilaterally; No rales, rhonchi, wheezing, or rubs  HEART: Regular rate and rhythm; No murmurs, rubs, or gallops  ABDOMEN: Soft, Nontender, Nondistended; Bowel sounds present  EXTREMITIES:  right foot in surgical bandage  LYMPH: No lymphadenopathy noted  SKIN: No rashes or lesions    LABS:                        7.3    10.01 )-----------( 418      ( 12 Sep 2024 06:10 )             22.4     12 Sep 2024 06:10    142    |  111    |  14     ----------------------------<  114    4.1     |  25     |  1.10     Ca    7.7        12 Sep 2024 06:10  Mg     1.8       12 Sep 2024 06:10        Urinalysis Basic - ( 12 Sep 2024 06:10 )    Color: x / Appearance: x / SG: x / pH: x  Gluc: 114 mg/dL / Ketone: x  / Bili: x / Urobili: x   Blood: x / Protein: x / Nitrite: x   Leuk Esterase: x / RBC: x / WBC x   Sq Epi: x / Non Sq Epi: x / Bacteria: x      CAPILLARY BLOOD GLUCOSE      POCT Blood Glucose.: 134 mg/dL (12 Sep 2024 12:45)  POCT Blood Glucose.: 141 mg/dL (12 Sep 2024 08:03)  POCT Blood Glucose.: 190 mg/dL (11 Sep 2024 22:16)  POCT Blood Glucose.: 169 mg/dL (11 Sep 2024 17:02)      RADIOLOGY & ADDITIONAL TESTS:    Notes Reviewed:  [x ] YES  [ ] NO    Care Discussed with Consultants/Other Providers [x ] YES  [ ] NO

## 2024-09-12 NOTE — PROGRESS NOTE ADULT - SUBJECTIVE AND OBJECTIVE BOX
[facility] -- Brittany Lutz, Zahra, Reynaldo, Román, Francesco Boss: Office # [phone]    Follow Up:  Cardiac clearance     Subjective/Observations: Patient awake, alert, resting in bed. Denies chest pain, palpitations and dizziness. Denies any difficulty breathing. No orthopnea and PND. Tolerating room air. + LE edema     REVIEW OF SYSTEMS: All other review of systems are negative unless indicated above    PAST MEDICAL & SURGICAL HISTORY:  Diabetes      Diabetes mellitus with no complication      Afib      Hypertension      Hypertension      BPH (benign prostatic hyperplasia)      Perforated gastric ulcer  s/p emergent ex-lap omentopexy and plication 6/2019      Pulmonary embolism      History of non-ST elevation myocardial infarction (NSTEMI)      Osteomyelitis  s/p debridement      CAD S/P percutaneous coronary angioplasty      Cerebrovascular accident      H/O abdominal surgery      Perforated gastric ulcer      Traumatic amputation of left foot, initial encounter          MEDICATIONS  (STANDING):  aMIOdarone    Tablet 200 milliGRAM(s) Oral daily  aMIOdarone    Tablet   Oral   ascorbic acid 500 milliGRAM(s) Oral daily  aspirin enteric coated 81 milliGRAM(s) Oral daily  atorvastatin 40 milliGRAM(s) Oral at bedtime  cetirizine 10 milliGRAM(s) Oral daily  cholecalciferol 1000 Unit(s) Oral daily  dextrose 5%. 1000 milliLiter(s) (100 mL/Hr) IV Continuous <Continuous>  dextrose 5%. 1000 milliLiter(s) (50 mL/Hr) IV Continuous <Continuous>  dextrose 50% Injectable 12.5 Gram(s) IV Push once  dextrose 50% Injectable 25 Gram(s) IV Push once  dextrose 50% Injectable 25 Gram(s) IV Push once  dextrose Oral Gel 15 Gram(s) Oral once  digoxin     Tablet 125 MICROGram(s) Oral daily  famotidine    Tablet 20 milliGRAM(s) Oral daily  ferrous    sulfate 325 milliGRAM(s) Oral two times a day  gabapentin 100 milliGRAM(s) Oral three times a day  glucagon  Injectable 1 milliGRAM(s) IntraMuscular once  insulin lispro (ADMELOG) corrective regimen sliding scale   SubCutaneous at bedtime  insulin lispro (ADMELOG) corrective regimen sliding scale   SubCutaneous three times a day before meals  lactated ringers. 1000 milliLiter(s) (75 mL/Hr) IV Continuous <Continuous>  melatonin 5 milliGRAM(s) Oral at bedtime  metoprolol tartrate 12.5 milliGRAM(s) Oral every 6 hours  midodrine. 15 milliGRAM(s) Oral three times a day  multivitamin 1 Tablet(s) Oral daily  mupirocin 2% Nasal 1 Application(s) Both Nostrils two times a day  naloxegol 25 milliGRAM(s) Oral daily  oxyCODONE  ER Tablet 10 milliGRAM(s) Oral every 12 hours  pantoprazole    Tablet 40 milliGRAM(s) Oral before breakfast  polyethylene glycol 3350 17 Gram(s) Oral daily  senna 2 Tablet(s) Oral at bedtime  sucralfate 1 Gram(s) Oral two times a day  tiotropium 2.5 MICROgram(s)/olodaterol 2.5 MICROgram(s) (STIOLTO) Inhaler 2 Puff(s) Inhalation daily  trimethoprim  160 mG/sulfamethoxazole 800 mG 1 Tablet(s) Oral two times a day    MEDICATIONS  (PRN):  acetaminophen     Tablet .. 650 milliGRAM(s) Oral every 6 hours PRN Temp greater or equal to 38C (100.4F)  HYDROmorphone  Injectable 1 milliGRAM(s) IV Push daily PRN Severe Pain (7 - 10)  sodium chloride 0.65% Nasal 1 Spray(s) Both Nostrils daily PRN Nasal Congestion  traMADol 50 milliGRAM(s) Oral every 4 hours PRN Mild Pain (1 - 3)    Allergies    fish (Hives)  ertapenem (Blisters; Rash)    Intolerances      Vital Signs Last 24 Hrs  T(C): 36.4 (12 Sep 2024 06:10), Max: 36.9 (11 Sep 2024 14:17)  T(F): 97.6 (12 Sep 2024 06:10), Max: 98.5 (11 Sep 2024 14:17)  HR: 98 (12 Sep 2024 06:10) (58 - 98)  BP: 102/69 (12 Sep 2024 06:10) (102/61 - 113/75)  BP(mean): --  RR: 18 (12 Sep 2024 06:10) (15 - 20)  SpO2: 95% (12 Sep 2024 06:10) (95% - 95%)    Parameters below as of 11 Sep 2024 20:55  Patient On (Oxygen Delivery Method): room air      I&O's Summary    11 Sep 2024 07:01  -  12 Sep 2024 07:00  --------------------------------------------------------  IN: 0 mL / OUT: 460 mL / NET: -460 mL          TELE: A fib 60-90s nonsustained 120s   PHYSICAL EXAM:  Constitutional: NAD, awake and alert  HEENT: Moist Mucous Membranes, Anicteric  Pulmonary: Non-labored, breath sounds are clear bilaterally, No wheezing, rales or rhonchi  Cardiovascular: Regular, S1 and S2, + murmurs, No rubs, gallops or clicks  Gastrointestinal:  soft, nontender, nondistended   Lymph: + 1-2 peripheral edema w/ rt foot edema. No lymphadenopathy.   Skin: No visible rashes Right foot DSD intact.   Psych:  Mood & affect appropriate      LABS: All Labs Reviewed:                        7.3    10.01 )-----------( 418      ( 12 Sep 2024 06:10 )             22.4                         6.9    x     )-----------( x        ( 11 Sep 2024 11:38 )             23.2                         7.2    x     )-----------( x        ( 10 Sep 2024 13:40 )             23.5     12 Sep 2024 06:10    142    |  111    |  14     ----------------------------<  114    4.1     |  25     |  1.10     Ca    7.7        12 Sep 2024 06:10  Mg     1.8       12 Sep 2024 06:10             TRANSTHORACIC ECHOCARDIOGRAM REPORT  ________________________________________________________________________________                                      _______       Pt. Name:       SARAH MORRISON Study Date:    9/5/2024  MRN:            [id number]         YOB: 1970  Accession #:    [id number]        Age:           54 years  Account#:       [id number]       Gender:        M  Heart Rate:                      Height:        74.02 in (188.00 cm)  Rhythm:                          Weight:        207.23 lb (94.00 kg)  Blood Pressure: 97/60 mmHg       BSA/BMI:       2.21 m² / 26.60 kg/m²  ________________________________________________________________________________________  Referring Physician:    [id number] Hill Brizuela  Interpreting Physician: Claudette Jacobs  Primary Sonographer:    Mounika FELDMAN    CPT:               ECHO TTE WO CON COMP W DOPP - 50401.m  Indication(s):     Abnormal electrocardiogram ECG/EKG - R94.31  Procedure:         Transthoracic echocardiogram with2-D, M-mode and complete                     spectral and color flow Doppler.  Ordering Location: [facility]  Admission Status:  Inpatient  Study Information: Image quality for this study is technically difficult.    _______________________________________________________________________________________     CONCLUSIONS:      1. Technically difficult image quality.   2. Left ventricular cavity is normal in size. Left ventricular wall thickness is normal. Left ventricular systolic function is normal withan ejection fraction of 69 % by Castañeda's method of disks. There are no regional wall motion abnormalities seen.   3. There is increased LV mass and concentric hypertrophy.   4. Normal right ventricular cavity size and normal right ventricular systolic function.   5. Left atrium is severely dilated.   6. The right atrium is severely dilated.   7. There is calcification of the mitral valve annulus.   8. Mitral valve leaflets are diffusely calcified.   9. Moderate to severe mitral regurgitation.  10. Trileaflet aortic valve with normal systolic excursion. There is calcification of the aortic valve leaflets.  11. Mild tricuspid regurgitation.  12. Estimated pulmonary artery systolic pressure is 39 mmHg, consistent with borderline pulmonary hypertension.  13. The inferior vena cava is normal in size measuring 1.75 cm in diameter, (normal <2.1cm) with normal inspiratory collapse (normal >50%) consistent with normal right atrial pressure (~3, range 0-5mmHg).    ________________________________________________________________________________________  FINDINGS:     Left Ventricle:  The left ventricular cavity is normal in size. Left ventricular wall thickness is normal. Left ventricular systolic function is normal with a calculated ejection fraction of 69 % by the Castañeda's biplane method of disks. There are no regional wall motion abnormalities seen. There is increased LV mass and concentric hypertrophy. The left ventricular diastolic function is indeterminate.     Right Ventricle:  The right ventricular cavity is normal in size and right ventricular systolic function is normal.     Left Atrium:  The left atrium is severely dilated with an indexed volume of 31.95 ml/m².     Right Atrium:  The right atrium is severely dilated with an indexed volume of 26.74 ml/m².     Aortic Valve:  The aortic valve appears trileaflet with normal systolic excursion. There is calcification of the aortic valve leaflets.     Mitral Valve:  There is calcification of the mitral valve annulus. Mitral valve leaflets are diffusely calcified. There is moderate to severe mitral regurgitation.     Tricuspid Valve:  Structurally normal tricuspid valve with normal leaflet excursion. There is mild tricuspid regurgitation. Estimated pulmonary artery systolicpressure is 39 mmHg, consistent with borderline pulmonary hypertension.     Pulmonic Valve:  The pulmonic valve was not well visualized. There is trace pulmonic regurgitation.     Systemic Veins:  The inferior vena cava is normal in size measuring 1.75 cm in diameter, (normal <2.1cm) with normal inspiratory collapse (normal >50%) consistent with normal right atrial pressure (~3, range 0-5mmHg).  ____________________________________________________________________  QUANTITATIVE DATA:  Left Ventricle Measurements: (Indexed to BSA)     IVSd (2D):   1.4 cm  LVPWd (2D):  1.3 cm  LVIDd (2D):  5.4 cm  LVIDs (2D):  4.0 cm  LV Mass:     306 g  138.7 g/m²  LV Vol d, MOD A2C: 87.4 ml 39.62 ml/m²  LV Vol d, MOD A4C: 81.2 ml 36.78 ml/m²  LV Vol d, MOD BP: 92.3 ml 41.82 ml/m²  LV Vol s, MOD A2C: 29.0 ml 13.13 ml/m²  LV Vol s, MOD A4C: 28.3 ml 12.82 ml/m²  LV Vol s, MOD BP:  28.9 ml 13.08 ml/m²  LVOT SV MOD BP:    63.4 ml  LV EF% MOD BP:     69 %     MV E Vmax:    1.19 m/s  e' lateral:   13.10 cm/s  e'medial:    10.70 cm/s  E/e' lateral: 9.05  E/e' medial:  12.00  E/e' Average: 9.96  MV DT:        148 msec    Aorta Measurements: (Normal range) (Indexed to BSA)     Ao Root d 3.23 cm (3.1 - 3.7 cm) 1.46 cm/m²            Left Atrium Measurements: (Indexed to BSA)  LA Diam 2D: 4.72 cm         Right Atrial Measurements:     RA Vol:       59.00 ml  RA Vol Index: 26.74 ml/m²       LVOT / RVOT/ Qp/Qs Data: (Indexed to BSA)  LVOT Diameter: 2.29 cm  LVOT Area:     4.12 cm²    Mitral Valve Measurements:     MV E Vmax: 1.2 m/s       Tricuspid Valve Measurements:     TR Vmax:          3.0 m/s  TR Peak Gradient: 35.9 mmHg  RA Pressure:      3 mmHg  PASP:             39 mmHg    ________________________________________________________________________________________  Electronically signed on 9/6/2024 at 10:38:59 AM by Claudette Jacobs         *** Final ***

## 2024-09-12 NOTE — SOCIAL WORK PROGRESS NOTE - NSSWPROGRESSNOTE_GEN_ALL_CORE
As per medical team, pt remains acute, plan is for return to [facility] for [facility] when medically cleared. SW will continue to follow to ensure safe transition.

## 2024-09-12 NOTE — PROGRESS NOTE ADULT - ASSESSMENT
55 YO M with past medical history of AFib (on Eliquis), s/p cardiac arrest x 2 w/ AHRF (on recent admission), indwelling Aguilera, cerebral aneurysm, CAD (s/p stents), CVA, T2DM, HTN, OM (s/p debridement), PE, perforated gastric ulcer s/p ometnopexy 2019 with Dr. Mejia who presents with possible gangrenous right foot. In ED pt found to be in afib with RVR and hypotension.    Afib with RVR  - Likely, in the setting of septic shock from gangrenous foot.   - S/p Cardizem gtt and Dig load.    - S/p Amiodarone PO load (5Gm).  Continue 200 mg PO qd  - tele w/ A fib 60-90s, nonsustained 120s   - Continue digoxin 125 pO QD.  Dig level = 1.1 (9/9)  - Continue metoprolol 12.5 QID. Plan to switch to long-acting  - Pt on Eliquis at home; held for Podia procedure  - Would continue to hold as Hgb trending down.   -  Would repeat and transfuse as needed to keep Hgb ~8    - TTE showed normal LVF with mod-severe MR  - No evidence of volume overload    - BP mostly stable and controlled   - Continue Midodrine 15mg TID for bp support    - With known Hx of CAD.  EKG nonischemic  - No evidence of any active ischemia   - Continue ASA and statin.      - S/P partial calcanectomy of right foot, 9/10  - Follow Podia recs  - Abx per ID    - Monitor and replete lytes, keep K>4, Mg>2.  - Will continue to follow.    Danny Arce, MS FNP, AGACNP  Nurse Practitioner- Cardiology   Please call on TEAMS

## 2024-09-12 NOTE — PROGRESS NOTE ADULT - PROBLEM SELECTOR PLAN 1
S/P IV ABX - zosyn per ID  ID eval with dr. lan appreciated  ? OM  ? MR  S/p debridement and partial calcanectomyper podiatry   may need BKA

## 2024-09-12 NOTE — PROGRESS NOTE ADULT - SUBJECTIVE AND OBJECTIVE BOX
CAPILLARY BLOOD GLUCOSE      POCT Blood Glucose.: 141 mg/dL (12 Sep 2024 08:03)  POCT Blood Glucose.: 190 mg/dL (11 Sep 2024 22:16)  POCT Blood Glucose.: 169 mg/dL (11 Sep 2024 17:02)  POCT Blood Glucose.: 157 mg/dL (11 Sep 2024 12:15)      Vital Signs Last 24 Hrs  T(C): 36.4 (12 Sep 2024 06:10), Max: 36.9 (11 Sep 2024 11:33)  T(F): 97.6 (12 Sep 2024 06:10), Max: 98.5 (11 Sep 2024 11:33)  HR: 98 (12 Sep 2024 06:10) (58 - 110)  BP: 102/69 (12 Sep 2024 06:10) (101/62 - 113/75)  BP(mean): --  RR: 18 (12 Sep 2024 06:10) (15 - 20)  SpO2: 95% (12 Sep 2024 06:10) (95% - 98%)    Parameters below as of 11 Sep 2024 20:55  Patient On (Oxygen Delivery Method): room air        General: WN/WD NAD  Respiratory: CTA B/L  CV: RRR, S1S2, no murmurs, rubs or gallops  Abdominal: Soft, NT, ND +BS, Last BM  Extremities: No edema, + peripheral pulses     09-12    142  |  111<H>  |  14  ----------------------------<  114<H>  4.1   |  25  |  1.10    Ca    7.7<L>      12 Sep 2024 06:10  Mg     1.8     09-12        atorvastatin 40 milliGRAM(s) Oral at bedtime  dextrose 50% Injectable 12.5 Gram(s) IV Push once  dextrose 50% Injectable 25 Gram(s) IV Push once  dextrose 50% Injectable 25 Gram(s) IV Push once  dextrose Oral Gel 15 Gram(s) Oral once  glucagon  Injectable 1 milliGRAM(s) IntraMuscular once  insulin lispro (ADMELOG) corrective regimen sliding scale   SubCutaneous three times a day before meals  insulin lispro (ADMELOG) corrective regimen sliding scale   SubCutaneous at bedtime

## 2024-09-12 NOTE — PROGRESS NOTE ADULT - SUBJECTIVE AND OBJECTIVE BOX
[facility] DIVISION of INFECTIOUS DISEASE  Juventino Whitten MD PhD, Symone Hickey MD, Anne-Marie Li MD, Jeffery Jacobs MD, Ryan Romeo MD  and providing coverage with Melody Armstrong MD  Providing Infectious Disease Consultations at [facility], [facility], [facility]'s    Office# [phone] to schedule follow up appointments  Answering Service for urgent calls or New Consults [phone]  Cell# to text for urgent issues Juventino Whitten [phone]     infectious diseases progress note:    SARAH MORRISON is a 54y y. o. Male patient    Overnight and events of the last 24hrs reviewed    Allergies    fish (Hives)  ertapenem (Blisters; Rash)    Intolerances        ANTIBIOTICS/RELEVANT:  antimicrobials  trimethoprim  160 mG/sulfamethoxazole 800 mG 1 Tablet(s) Oral two times a day    immunologic:    OTHER:  acetaminophen     Tablet .. 650 milliGRAM(s) Oral every 6 hours PRN  aMIOdarone    Tablet 200 milliGRAM(s) Oral daily  aMIOdarone    Tablet   Oral   ascorbic acid 500 milliGRAM(s) Oral daily  aspirin enteric coated 81 milliGRAM(s) Oral daily  atorvastatin 40 milliGRAM(s) Oral at bedtime  cetirizine 10 milliGRAM(s) Oral daily  cholecalciferol 1000 Unit(s) Oral daily  dextrose 5%. 1000 milliLiter(s) IV Continuous <Continuous>  dextrose 5%. 1000 milliLiter(s) IV Continuous <Continuous>  dextrose 50% Injectable 25 Gram(s) IV Push once  dextrose 50% Injectable 25 Gram(s) IV Push once  dextrose 50% Injectable 12.5 Gram(s) IV Push once  dextrose Oral Gel 15 Gram(s) Oral once  digoxin     Tablet 125 MICROGram(s) Oral daily  famotidine    Tablet 20 milliGRAM(s) Oral daily  ferrous    sulfate 325 milliGRAM(s) Oral two times a day  gabapentin 100 milliGRAM(s) Oral every 12 hours  glucagon  Injectable 1 milliGRAM(s) IntraMuscular once  HYDROmorphone  Injectable 1 milliGRAM(s) IV Push daily PRN  insulin lispro (ADMELOG) corrective regimen sliding scale   SubCutaneous at bedtime  insulin lispro (ADMELOG) corrective regimen sliding scale   SubCutaneous three times a day before meals  lactated ringers. 1000 milliLiter(s) IV Continuous <Continuous>  melatonin 5 milliGRAM(s) Oral at bedtime  metoprolol tartrate 12.5 milliGRAM(s) Oral every 6 hours  midodrine. 15 milliGRAM(s) Oral three times a day  multivitamin 1 Tablet(s) Oral daily  mupirocin 2% Nasal 1 Application(s) Both Nostrils two times a day  naloxegol 25 milliGRAM(s) Oral daily  oxyCODONE    IR 10 milliGRAM(s) Oral two times a day  pantoprazole    Tablet 40 milliGRAM(s) Oral before breakfast  polyethylene glycol 3350 17 Gram(s) Oral daily  senna 2 Tablet(s) Oral at bedtime  sodium chloride 0.65% Nasal 1 Spray(s) Both Nostrils daily PRN  sucralfate 1 Gram(s) Oral two times a day  tiotropium 2.5 MICROgram(s)/olodaterol 2.5 MICROgram(s) (STIOLTO) Inhaler 2 Puff(s) Inhalation daily      Objective:  Vital Signs Last 24 Hrs  T(C): 36.4 (12 Sep 2024 06:10), Max: 36.9 (11 Sep 2024 14:17)  T(F): 97.6 (12 Sep 2024 06:10), Max: 98.5 (11 Sep 2024 14:17)  HR: 98 (12 Sep 2024 06:10) (58 - 98)  BP: 102/69 (12 Sep 2024 06:10) (102/61 - 113/75)  BP(mean): --  RR: 18 (12 Sep 2024 06:10) (15 - 20)  SpO2: 95% (12 Sep 2024 06:10) (95% - 95%)    Parameters below as of 11 Sep 2024 20:55  Patient On (Oxygen Delivery Method): room air        T(C): 36.4 (09-12-24 @ 06:10), Max: 36.9 (09-11-24 @ 11:33)  T(C): 36.4 (09-12-24 @ 06:10), Max: 37.3 (09-09-24 @ 12:28)  T(C): 36.4 (09-12-24 @ 06:10), Max: 37.3 (09-09-24 @ 12:28)    PHYSICAL EXAM:  HEENT: NC atraumatic  Neck: supple  Respiratory: no accessory muscle use, breathing comfortably  Cardiovascular: distant  Gastrointestinal: normal appearing, nondistended  Extremities: no clubbing, no cyanosis,        LABS:                          7.3    10.01 )-----------( 418      ( 12 Sep 2024 06:10 )             22.4       WBC  10.01 09-12 @ 06:10  10.15 09-09 @ 05:25  11.70 09-08 @ 06:15  13.00 09-07 @ 06:20  10.96 09-06 @ 12:14  10.46 09-06 @ 05:46      09-12    142  |  111<H>  |  14  ----------------------------<  114<H>  4.1   |  25  |  1.10    Ca    7.7<L>      12 Sep 2024 06:10  Mg     1.8     09-12        Creatinine: 1.10 mg/dL (09-12-24 @ 06:10)  Creatinine: 0.68 mg/dL (09-09-24 @ 05:25)  Creatinine: 0.67 mg/dL (09-08-24 @ 06:15)  Creatinine: 0.63 mg/dL (09-07-24 @ 06:20)  Creatinine: 0.78 mg/dL (09-06-24 @ 05:46)        Urinalysis Basic - ( 12 Sep 2024 06:10 )    Color: x / Appearance: x / SG: x / pH: x  Gluc: 114 mg/dL / Ketone: x  / Bili: x / Urobili: x   Blood: x / Protein: x / Nitrite: x   Leuk Esterase: x / RBC: x / WBC x   Sq Epi: x / Non Sq Epi: x / Bacteria: x            INFLAMMATORY MARKERS      MICROBIOLOGY:    Culture - Tissue with Gram Stain (09.10.24 @ 12:30)    Gram Stain:   No polymorphonuclear leukocytes seen per low power field  Few Gram Negative Rods seen per oil power field  Rare Gram positive cocci in pairs seen per oil power field   Specimen Source: Tissue Other, RIGHT FOOT CALCANEOUS BONE   Culture Results:   Culture yields >4 types of aerobic and/or anaerobic bacteria  Call client services within 7 days if further workup is clinically  indicated.    Culture - Tissue with Gram Stain (09.10.24 @ 12:30)    Gram Stain:   No polymorphonuclear leukocytes seen per low power field  Numerous Gram Variable Rods seen per oil power field  Moderate Gram positive cocci in pairs seen per oil power field   Specimen Source: Tissue Other, RIGHT FOOT SOFT TISSUE CULTURE   Culture Results:   Culture yields >4 types of aerobic and/or anaerobic bacteria  Call client services within 7 days if further workup is clinically  indicated.  Culture includes  Moderate Staphylococcus aureus          RADIOLOGY & ADDITIONAL STUDIES:

## 2024-09-12 NOTE — PROGRESS NOTE ADULT - PROBLEM SELECTOR PLAN 1
Patient evaluated and chart reviewed.  POD#2 s/p right foot partial calcanectomy and soft tissue debridement.  Patient received 2nd unit PRBC last night.  Hgb 7.3 today.  No strikethrough bleeding noted to bandages today.  Reapplied compressive bolster dressing today.  Repeat H+H ordered,   Surgical pathology pending.  Continue abx per ID recs.   Patient will continue to be monitored closely by podiatry.  Plan to be discussed with attending.

## 2024-09-12 NOTE — PROGRESS NOTE ADULT - SUBJECTIVE AND OBJECTIVE BOX
PROGRESS NOTE   Patient is a 54y old  Male who presents with a chief complaint of AF (12 Sep 2024 10:04)      HPI:  53yo male bib ems from wound care with hypotension and poss gangrenous foot, being treated after having amputations, pt denies fever, chills, has no pain  In ER patient was found to be in hypotension and rapid AF.  patient is being admitted for further work up and treatment.   (05 Sep 2024 17:06)      Vital Signs Last 24 Hrs  T(C): 36.4 (12 Sep 2024 06:10), Max: 36.9 (11 Sep 2024 14:17)  T(F): 97.6 (12 Sep 2024 06:10), Max: 98.5 (11 Sep 2024 14:17)  HR: 98 (12 Sep 2024 06:10) (58 - 98)  BP: 102/69 (12 Sep 2024 06:10) (102/61 - 113/75)  BP(mean): --  RR: 18 (12 Sep 2024 06:10) (15 - 20)  SpO2: 95% (12 Sep 2024 06:10) (95% - 95%)    Parameters below as of 11 Sep 2024 20:55  Patient On (Oxygen Delivery Method): room air                              7.3    10.01 )-----------( 418      ( 12 Sep 2024 06:10 )             22.4               09-12    142  |  111<H>  |  14  ----------------------------<  114<H>  4.1   |  25  |  1.10    Ca    7.7<L>      12 Sep 2024 06:10  Mg     1.8     09-12        PHYSICAL EXAM  Vasc: DP PT pulses non-palpable b/l.  Neuro: Diminished protective sensation b/l  MSK: Healed TMA noted to left foot. s/p right partial calcanectomy with wound debridement   Dermatological: Surgical site of right foot noted to have mild winston-incision erythema, no proximal streaking, no fluctuance, no malodor,      PROGRESS NOTE   Patient is a 54y old  Male who presents with a chief complaint of AF (12 Sep 2024 10:04)      HPI:  53yo male bib ems from wound care with hypotension and poss gangrenous foot, being treated after having amputations, pt denies fever, chills, has no pain  In ER patient was found to be in hypotension and rapid AF.  patient is being admitted for further work up and treatment.   (05 Sep 2024 17:06)      Vital Signs Last 24 Hrs  T(C): 36.4 (12 Sep 2024 06:10), Max: 36.9 (11 Sep 2024 14:17)  T(F): 97.6 (12 Sep 2024 06:10), Max: 98.5 (11 Sep 2024 14:17)  HR: 98 (12 Sep 2024 06:10) (58 - 98)  BP: 102/69 (12 Sep 2024 06:10) (102/61 - 113/75)  BP(mean): --  RR: 18 (12 Sep 2024 06:10) (15 - 20)  SpO2: 95% (12 Sep 2024 06:10) (95% - 95%)    Parameters below as of 11 Sep 2024 20:55  Patient On (Oxygen Delivery Method): room air                              7.3    10.01 )-----------( 418      ( 12 Sep 2024 06:10 )             22.4               09-12    142  |  111<H>  |  14  ----------------------------<  114<H>  4.1   |  25  |  1.10    Ca    7.7<L>      12 Sep 2024 06:10  Mg     1.8     09-12        PHYSICAL EXAM  Vasc: DP PT pulses non-palpable b/l.  Neuro: Diminished protective sensation b/l  MSK: Healed TMA noted to left foot. s/p right partial calcanectomy with wound debridement   Dermatological: Surgical site of right foot noted to have mild winston-incision erythema, no proximal streaking, no fluctuance, no malodor, no sanguinous oozing at this time

## 2024-09-13 DIAGNOSIS — D63.8 ANEMIA IN OTHER CHRONIC DISEASES CLASSIFIED ELSEWHERE: ICD-10-CM

## 2024-09-13 LAB
-  AMOXICILLIN/CLAVULANIC ACID: SIGNIFICANT CHANGE UP
-  AMPICILLIN: SIGNIFICANT CHANGE UP
-  CEFTRIAXONE: SIGNIFICANT CHANGE UP
-  CIPROFLOXACIN: SIGNIFICANT CHANGE UP
-  CLINDAMYCIN: SIGNIFICANT CHANGE UP
-  DAPTOMYCIN: SIGNIFICANT CHANGE UP
-  ERYTHROMYCIN: SIGNIFICANT CHANGE UP
-  GENTAMICIN: SIGNIFICANT CHANGE UP
-  LEVOFLOXACIN: SIGNIFICANT CHANGE UP
-  LINEZOLID: SIGNIFICANT CHANGE UP
-  MEROPENEM: SIGNIFICANT CHANGE UP
-  MOXIFLOXACIN: SIGNIFICANT CHANGE UP
-  OXACILLIN: SIGNIFICANT CHANGE UP
-  PENICILLIN: SIGNIFICANT CHANGE UP
-  RIFAMPIN: SIGNIFICANT CHANGE UP
-  TETRACYCLINE: SIGNIFICANT CHANGE UP
-  TRIMETHOPRIM/SULFAMETHOXAZOLE: SIGNIFICANT CHANGE UP
-  VANCOMYCIN: SIGNIFICANT CHANGE UP
ALBUMIN SERPL ELPH-MCNC: 1.4 G/DL — LOW (ref 3.3–5)
ALP SERPL-CCNC: 95 U/L — SIGNIFICANT CHANGE UP (ref 40–120)
ALT FLD-CCNC: 24 U/L — SIGNIFICANT CHANGE UP (ref 12–78)
ANION GAP SERPL CALC-SCNC: 7 MMOL/L — SIGNIFICANT CHANGE UP (ref 5–17)
AST SERPL-CCNC: 26 U/L — SIGNIFICANT CHANGE UP (ref 15–37)
BILIRUB SERPL-MCNC: 0.2 MG/DL — SIGNIFICANT CHANGE UP (ref 0.2–1.2)
BUN SERPL-MCNC: 9 MG/DL — SIGNIFICANT CHANGE UP (ref 7–23)
CALCIUM SERPL-MCNC: 7.7 MG/DL — LOW (ref 8.5–10.1)
CHLORIDE SERPL-SCNC: 109 MMOL/L — HIGH (ref 96–108)
CO2 SERPL-SCNC: 23 MMOL/L — SIGNIFICANT CHANGE UP (ref 22–31)
CREAT SERPL-MCNC: 0.86 MG/DL — SIGNIFICANT CHANGE UP (ref 0.5–1.3)
CULTURE RESULTS: ABNORMAL
EGFR: 102 ML/MIN/1.73M2 — SIGNIFICANT CHANGE UP
GLUCOSE BLDC GLUCOMTR-MCNC: 140 MG/DL — HIGH (ref 70–99)
GLUCOSE BLDC GLUCOMTR-MCNC: 163 MG/DL — HIGH (ref 70–99)
GLUCOSE BLDC GLUCOMTR-MCNC: 165 MG/DL — HIGH (ref 70–99)
GLUCOSE BLDC GLUCOMTR-MCNC: 184 MG/DL — HIGH (ref 70–99)
GLUCOSE SERPL-MCNC: 168 MG/DL — HIGH (ref 70–99)
HCT VFR BLD CALC: 26.7 % — LOW (ref 39–50)
HCT VFR BLD CALC: 27.6 % — LOW (ref 39–50)
HGB BLD-MCNC: 8.7 G/DL — LOW (ref 13–17)
HGB BLD-MCNC: 8.7 G/DL — LOW (ref 13–17)
MCHC RBC-ENTMCNC: 30.2 PG — SIGNIFICANT CHANGE UP (ref 27–34)
MCHC RBC-ENTMCNC: 32.6 GM/DL — SIGNIFICANT CHANGE UP (ref 32–36)
MCV RBC AUTO: 92.7 FL — SIGNIFICANT CHANGE UP (ref 80–100)
METHOD TYPE: SIGNIFICANT CHANGE UP
NRBC # BLD: 0 /100 WBCS — SIGNIFICANT CHANGE UP (ref 0–0)
ORGANISM # SPEC MICROSCOPIC CNT: ABNORMAL
ORGANISM # SPEC MICROSCOPIC CNT: SIGNIFICANT CHANGE UP
PLATELET # BLD AUTO: 404 K/UL — HIGH (ref 150–400)
POTASSIUM SERPL-MCNC: 4 MMOL/L — SIGNIFICANT CHANGE UP (ref 3.5–5.3)
POTASSIUM SERPL-SCNC: 4 MMOL/L — SIGNIFICANT CHANGE UP (ref 3.5–5.3)
PROT SERPL-MCNC: 4.7 G/DL — LOW (ref 6–8.3)
RBC # BLD: 2.88 M/UL — LOW (ref 4.2–5.8)
RBC # FLD: 18.6 % — HIGH (ref 10.3–14.5)
SODIUM SERPL-SCNC: 139 MMOL/L — SIGNIFICANT CHANGE UP (ref 135–145)
SPECIMEN SOURCE: SIGNIFICANT CHANGE UP
WBC # BLD: 11.32 K/UL — HIGH (ref 3.8–10.5)
WBC # FLD AUTO: 11.32 K/UL — HIGH (ref 3.8–10.5)

## 2024-09-13 PROCEDURE — 99024 POSTOP FOLLOW-UP VISIT: CPT

## 2024-09-13 PROCEDURE — 99232 SBSQ HOSP IP/OBS MODERATE 35: CPT

## 2024-09-13 PROCEDURE — 75635 CT ANGIO ABDOMINAL ARTERIES: CPT | Mod: 26

## 2024-09-13 RX ORDER — SULFAMETHOXAZOLE AND TRIMETHOPRIM 800; 160 MG/1; MG/1
1 TABLET ORAL
Qty: 0 | Refills: 0 | DISCHARGE
Start: 2024-09-13

## 2024-09-13 RX ORDER — ENOXAPARIN SODIUM 100 MG/ML
100 INJECTION SUBCUTANEOUS EVERY 12 HOURS
Refills: 0 | Status: DISCONTINUED | OUTPATIENT
Start: 2024-09-13 | End: 2024-09-16

## 2024-09-13 RX ORDER — TRAMADOL HYDROCHLORIDE 200 MG/1
1 TABLET, EXTENDED RELEASE ORAL
Qty: 0 | Refills: 0 | DISCHARGE
Start: 2024-09-13

## 2024-09-13 RX ORDER — DIGOXIN 0.12 MG/1
1 TABLET ORAL
Qty: 0 | Refills: 0 | DISCHARGE
Start: 2024-09-13

## 2024-09-13 RX ORDER — MIDODRINE HYDROCHLORIDE 5 MG/1
3 TABLET ORAL
Qty: 0 | Refills: 0 | DISCHARGE
Start: 2024-09-13

## 2024-09-13 RX ORDER — OXYCODONE HYDROCHLORIDE 15 MG/1
1 TABLET ORAL
Qty: 0 | Refills: 0 | DISCHARGE
Start: 2024-09-13

## 2024-09-13 RX ADMIN — METOPROLOL TARTRATE 12.5 MILLIGRAM(S): 100 TABLET ORAL at 05:45

## 2024-09-13 RX ADMIN — TIOTROPIUM BROMIDE AND OLODATEROL 2 PUFF(S): 3.124; 2.736 SPRAY, METERED RESPIRATORY (INHALATION) at 06:38

## 2024-09-13 RX ADMIN — SUCRALFATE 1 GRAM(S): 1 SUSPENSION ORAL at 17:36

## 2024-09-13 RX ADMIN — METOPROLOL TARTRATE 12.5 MILLIGRAM(S): 100 TABLET ORAL at 12:34

## 2024-09-13 RX ADMIN — OXYCODONE HYDROCHLORIDE 10 MILLIGRAM(S): 5 TABLET ORAL at 17:36

## 2024-09-13 RX ADMIN — NALOXEGOL OXALATE 25 MILLIGRAM(S): 25 TABLET, FILM COATED ORAL at 12:34

## 2024-09-13 RX ADMIN — OXYCODONE HYDROCHLORIDE 10 MILLIGRAM(S): 5 TABLET ORAL at 05:44

## 2024-09-13 RX ADMIN — SULFAMETHOXAZOLE AND TRIMETHOPRIM 1 TABLET(S): 800; 160 TABLET ORAL at 17:37

## 2024-09-13 RX ADMIN — Medication 100 MILLIGRAM(S): at 21:57

## 2024-09-13 RX ADMIN — OXYCODONE HYDROCHLORIDE 200 MILLIGRAM(S): 15 TABLET ORAL at 05:44

## 2024-09-13 RX ADMIN — METOPROLOL TARTRATE 12.5 MILLIGRAM(S): 100 TABLET ORAL at 17:47

## 2024-09-13 RX ADMIN — DIGOXIN 125 MICROGRAM(S): 0.12 TABLET ORAL at 05:45

## 2024-09-13 RX ADMIN — Medication 100 MILLIGRAM(S): at 05:44

## 2024-09-13 RX ADMIN — MIDODRINE HYDROCHLORIDE 15 MILLIGRAM(S): 5 TABLET ORAL at 17:35

## 2024-09-13 RX ADMIN — SUCRALFATE 1 GRAM(S): 1 SUSPENSION ORAL at 05:45

## 2024-09-13 RX ADMIN — Medication 100 MILLIGRAM(S): at 14:17

## 2024-09-13 RX ADMIN — Medication 1 APPLICATION(S): at 05:45

## 2024-09-13 RX ADMIN — METOPROLOL TARTRATE 12.5 MILLIGRAM(S): 100 TABLET ORAL at 00:50

## 2024-09-13 RX ADMIN — Medication 1: at 17:33

## 2024-09-13 RX ADMIN — Medication 1 APPLICATION(S): at 17:47

## 2024-09-13 RX ADMIN — OXYCODONE HYDROCHLORIDE 10 MILLIGRAM(S): 5 TABLET ORAL at 06:35

## 2024-09-13 RX ADMIN — Medication 1 TABLET(S): at 12:35

## 2024-09-13 RX ADMIN — POLYETHYLENE GLYCOL 3350 17 GRAM(S): 17 POWDER, FOR SOLUTION ORAL at 12:34

## 2024-09-13 RX ADMIN — MIDODRINE HYDROCHLORIDE 15 MILLIGRAM(S): 5 TABLET ORAL at 05:44

## 2024-09-13 RX ADMIN — ENOXAPARIN SODIUM 100 MILLIGRAM(S): 100 INJECTION SUBCUTANEOUS at 17:33

## 2024-09-13 RX ADMIN — Medication 81 MILLIGRAM(S): at 12:35

## 2024-09-13 RX ADMIN — Medication 1: at 12:36

## 2024-09-13 RX ADMIN — MIDODRINE HYDROCHLORIDE 15 MILLIGRAM(S): 5 TABLET ORAL at 12:35

## 2024-09-13 RX ADMIN — Medication 10 MILLIGRAM(S): at 12:35

## 2024-09-13 RX ADMIN — FAMOTIDINE 20 MILLIGRAM(S): 10 INJECTION INTRAVENOUS at 12:35

## 2024-09-13 RX ADMIN — Medication 1000 UNIT(S): at 12:35

## 2024-09-13 RX ADMIN — SULFAMETHOXAZOLE AND TRIMETHOPRIM 1 TABLET(S): 800; 160 TABLET ORAL at 05:44

## 2024-09-13 RX ADMIN — Medication 40 MILLIGRAM(S): at 21:57

## 2024-09-13 RX ADMIN — Medication 5 MILLIGRAM(S): at 21:57

## 2024-09-13 RX ADMIN — Medication 500 MILLIGRAM(S): at 12:34

## 2024-09-13 RX ADMIN — Medication 325 MILLIGRAM(S): at 05:43

## 2024-09-13 RX ADMIN — Medication 40 MILLIGRAM(S): at 05:44

## 2024-09-13 RX ADMIN — Medication 325 MILLIGRAM(S): at 17:38

## 2024-09-13 NOTE — PROGRESS NOTE ADULT - ASSESSMENT
54m with dm, cad, pad, dvt atrial fib, hypertension, stroke, indwelling jacques, PE, indwelling jacques presents from wound care with  infected right heel ulcer  r/o osteomyelitis  leukocytosis   hypotension, atrial fib    Right Heel Osteomyelitis  Culture - Wound Aerobic/Anaerobic (09.05.24 @ 12:55):    Organism: Escherichia coli ESBL   Organism: Citrobacter freundii   Organism: Providencia stuartii  noted severe ertapenem reaction  surgery, 09/10/2024  right partial calcanectomy,  pt reports having metal stents that prevent MRI  Culture - Tissue with Gram Stain (09.10.24 @ 12:30)  Moderate Staphylococcus aureus-MRSA and mixed microbes all sensitive to Bactrim  aztreonam--> changed to Bactrim 9/9-continue for now (discussed w pt that I am cautious regarding abx with prior issues)  PATH PENDING -recs to follow    Thank you for consulting us and involving us in the management of this most interesting and challenging case.  We will follow along in the care of this patient. Please call us at [phone] or text me directly on my cell# at [phone] with any concerns.

## 2024-09-13 NOTE — CONSULT NOTE ADULT - CONSULT REQUESTED DATE/TIME
05-Sep-2024 14:38
06-Sep-2024 08:17
05-Sep-2024 17:40
13-Sep-2024 08:32
05-Sep-2024 14:52
06-Sep-2024 09:26
05-Sep-2024 12:24

## 2024-09-13 NOTE — PROGRESS NOTE ADULT - SUBJECTIVE AND OBJECTIVE BOX
[facility] -- Brittany Lutz Pannella, Patel, Savella, Goodger, Cohen  Office # [phone]    Follow Up:  Cardiac optimization     Subjective/Observations: seen and examined, awake, alert, resting comfortably in bed, denies chest pain, dyspnea, palpitations or dizziness, orthopnea and PND. Tolerating room air. no events overnight     REVIEW OF SYSTEMS: All other review of systems is negative unless indicated above  PAST MEDICAL & SURGICAL HISTORY:  Diabetes      Diabetes mellitus with no complication      Afib      Hypertension      BPH (benign prostatic hyperplasia)      Perforated gastric ulcer  s/p emergent ex-lap omentopexy and plication 6/2019      Pulmonary embolism      History of non-ST elevation myocardial infarction (NSTEMI)      Osteomyelitis  s/p debridement      CAD S/P percutaneous coronary angioplasty      Cerebrovascular accident      H/O abdominal surgery      Perforated gastric ulcer      Traumatic amputation of left foot, initial encounter        MEDICATIONS  (STANDING):  aMIOdarone    Tablet   Oral   aMIOdarone    Tablet 200 milliGRAM(s) Oral daily  ascorbic acid 500 milliGRAM(s) Oral daily  aspirin enteric coated 81 milliGRAM(s) Oral daily  atorvastatin 40 milliGRAM(s) Oral at bedtime  cetirizine 10 milliGRAM(s) Oral daily  cholecalciferol 1000 Unit(s) Oral daily  dextrose 5%. 1000 milliLiter(s) (100 mL/Hr) IV Continuous <Continuous>  dextrose 5%. 1000 milliLiter(s) (50 mL/Hr) IV Continuous <Continuous>  dextrose 50% Injectable 25 Gram(s) IV Push once  dextrose 50% Injectable 12.5 Gram(s) IV Push once  dextrose 50% Injectable 25 Gram(s) IV Push once  dextrose Oral Gel 15 Gram(s) Oral once  digoxin     Tablet 125 MICROGram(s) Oral daily  enoxaparin Injectable 100 milliGRAM(s) SubCutaneous every 12 hours  famotidine    Tablet 20 milliGRAM(s) Oral daily  ferrous    sulfate 325 milliGRAM(s) Oral two times a day  gabapentin 100 milliGRAM(s) Oral three times a day  glucagon  Injectable 1 milliGRAM(s) IntraMuscular once  insulin lispro (ADMELOG) corrective regimen sliding scale   SubCutaneous at bedtime  insulin lispro (ADMELOG) corrective regimen sliding scale   SubCutaneous three times a day before meals  melatonin 5 milliGRAM(s) Oral at bedtime  metoprolol tartrate 12.5 milliGRAM(s) Oral every 6 hours  midodrine. 15 milliGRAM(s) Oral three times a day  multivitamin 1 Tablet(s) Oral daily  mupirocin 2% Nasal 1 Application(s) Both Nostrils two times a day  naloxegol 25 milliGRAM(s) Oral daily  oxyCODONE  ER Tablet 10 milliGRAM(s) Oral every 12 hours  pantoprazole    Tablet 40 milliGRAM(s) Oral before breakfast  polyethylene glycol 3350 17 Gram(s) Oral daily  senna 2 Tablet(s) Oral at bedtime  sucralfate 1 Gram(s) Oral two times a day  tiotropium 2.5 MICROgram(s)/olodaterol 2.5 MICROgram(s) (STIOLTO) Inhaler 2 Puff(s) Inhalation daily  trimethoprim  160 mG/sulfamethoxazole 800 mG 1 Tablet(s) Oral two times a day    MEDICATIONS  (PRN):  acetaminophen     Tablet .. 650 milliGRAM(s) Oral every 6 hours PRN Temp greater or equal to 38C (100.4F)  HYDROmorphone  Injectable 1 milliGRAM(s) IV Push daily PRN Severe Pain (7 - 10)  oxyCODONE    IR 2.5 milliGRAM(s) Oral every 4 hours PRN Moderate Pain (4 - 6)  sodium chloride 0.65% Nasal 1 Spray(s) Both Nostrils daily PRN Nasal Congestion  traMADol 50 milliGRAM(s) Oral every 4 hours PRN Mild Pain (1 - 3)    Allergies    fish (Hives)  ertapenem (Blisters; Rash)    Intolerances      Vital Signs Last 24 Hrs  T(C): 36.4 (13 Sep 2024 05:49), Max: 36.8 (12 Sep 2024 20:10)  T(F): 97.6 (13 Sep 2024 05:49), Max: 98.3 (12 Sep 2024 20:10)  HR: 79 (13 Sep 2024 12:28) (70 - 80)  BP: 99/65 (13 Sep 2024 12:28) (99/65 - 126/87)  BP(mean): --  RR: 18 (13 Sep 2024 05:49) (18 - 18)  SpO2: 94% (13 Sep 2024 05:49) (94% - 97%)    Parameters below as of 13 Sep 2024 05:49  Patient On (Oxygen Delivery Method): room air      I&O's Summary    12 Sep 2024 07:01  -  13 Sep 2024 07:00  --------------------------------------------------------  IN: 0 mL / OUT: 1950 mL / NET: -1950 mL          TELE:   PHYSICAL EXAM:  Constitutional: NAD, awake and alert  HEENT: Moist Mucous Membranes, Anicteric  Pulmonary: Non-labored, breath sounds are clear bilaterally, No wheezing, rales or rhonchi  Cardiovascular: IRRRegular, S1 and S2, No murmurs, rubs, gallops or clicks  Gastrointestinal: Bowel Sounds present, soft, nontender.   Lymph: No peripheral edema. No lymphadenopathy.  Skin: No visible rashes or ulcers.  Psych:  Mood & affect appropriate  LABS: All Labs Reviewed:                        8.7    11.32 )-----------( 404      ( 13 Sep 2024 10:18 )             26.7                         7.3    10.01 )-----------( 418      ( 12 Sep 2024 06:10 )             22.4                         6.9    x     )-----------( x        ( 11 Sep 2024 11:38 )             23.2     13 Sep 2024 10:18    139    |  109    |  9      ----------------------------<  168    4.0     |  23     |  0.86   12 Sep 2024 06:10    142    |  111    |  14     ----------------------------<  114    4.1     |  25     |  1.10     Ca    7.7        13 Sep 2024 10:18  Ca    7.7        12 Sep 2024 06:10  Mg     1.8       12 Sep 2024 06:10    TPro  4.7    /  Alb  1.4    /  TBili  0.2    /  DBili  x      /  AST  26     /  ALT  24     /  AlkPhos  95     13 Sep 2024 10:18              TRANSTHORACIC ECHOCARDIOGRAM REPORT  ________________________________________________________________________________                                      _______       Pt. Name:       SARAH MORRISON Study Date:    9/5/2024  MRN:            [id number]         YOB: 1970  Accession #:    [id number]        Age:           54 years  Account#:       [id number]       Gender:        M  Heart Rate:                      Height:        74.02 in (188.00 cm)  Rhythm:                          Weight:        207.23 lb (94.00 kg)  Blood Pressure: 97/60 mmHg       BSA/BMI:       2.21 m² / 26.60 kg/m²  ________________________________________________________________________________________  Referring Physician:    [id number] Hill Brizuela  Interpreting Physician: Claudette Jacobs  Primary Sonographer:    Mounika FELDMAN    CPT:               ECHO TTE WO CON COMP W DOPP - 79637.m  Indication(s):     Abnormal electrocardiogram ECG/EKG - R94.31  Procedure:         Transthoracic echocardiogram with2-D, M-mode and complete                     spectral and color flow Doppler.  Ordering Location: [facility]  Admission Status:  Inpatient  Study Information: Image quality for this study is technically difficult.    _______________________________________________________________________________________     CONCLUSIONS:      1. Technically difficult image quality.   2. Left ventricular cavity is normal in size. Left ventricular wall thickness is normal. Left ventricular systolic function is normal withan ejection fraction of 69 % by Castañeda's method of disks. There are no regional wall motion abnormalities seen.   3. There is increased LV mass and concentric hypertrophy.   4. Normal right ventricular cavity size and normal right ventricular systolic function.   5. Left atrium is severely dilated.   6. The right atrium is severely dilated.   7. There is calcification of the mitral valve annulus.   8. Mitral valve leaflets are diffusely calcified.   9. Moderate to severe mitral regurgitation.  10. Trileaflet aortic valve with normal systolic excursion. There is calcification of the aortic valve leaflets.  11. Mild tricuspid regurgitation.  12. Estimated pulmonary artery systolic pressure is 39 mmHg, consistent with borderline pulmonary hypertension.  13. The inferior vena cava is normal in size measuring 1.75 cm in diameter, (normal <2.1cm) with normal inspiratory collapse (normal >50%) consistent with normal right atrial pressure (~3, range 0-5mmHg).    ________________________________________________________________________________________  FINDINGS:     Left Ventricle:  The left ventricular cavity is normal in size. Left ventricular wall thickness is normal. Left ventricular systolic function is normal with a calculated ejection fraction of 69 % by the Castañeda's biplane method of disks. There are no regional wall motion abnormalities seen. There is increased LV mass and concentric hypertrophy. The left ventricular diastolic function is indeterminate.     Right Ventricle:  The right ventricular cavity is normal in size and right ventricular systolic function is normal.     Left Atrium:  The left atrium is severely dilated with an indexed volume of 31.95 ml/m².     Right Atrium:  The right atrium is severely dilated with an indexed volume of 26.74 ml/m².     Aortic Valve:  The aortic valve appears trileaflet with normal systolic excursion. There is calcification of the aortic valve leaflets.     Mitral Valve:  There is calcification of the mitral valve annulus. Mitral valve leaflets are diffusely calcified. There is moderate to severe mitral regurgitation.     Tricuspid Valve:  Structurally normal tricuspid valve with normal leaflet excursion. There is mild tricuspid regurgitation. Estimated pulmonary artery systolicpressure is 39 mmHg, consistent with borderline pulmonary hypertension.     Pulmonic Valve:  The pulmonic valve was not well visualized. There is trace pulmonic regurgitation.     Systemic Veins:  The inferior vena cava is normal in size measuring 1.75 cm in diameter, (normal <2.1cm) with normal inspiratory collapse (normal >50%) consistent with normal right atrial pressure (~3, range 0-5mmHg).  ____________________________________________________________________  QUANTITATIVE DATA:  Left Ventricle Measurements: (Indexed to BSA)     IVSd (2D):   1.4 cm  LVPWd (2D):  1.3 cm  LVIDd (2D):  5.4 cm  LVIDs (2D):  4.0 cm  LV Mass:     306 g  138.7 g/m²  LV Vol d, MOD A2C: 87.4 ml 39.62 ml/m²  LV Vol d, MOD A4C: 81.2 ml 36.78 ml/m²  LV Vol d, MOD BP: 92.3 ml 41.82 ml/m²  LV Vol s, MOD A2C: 29.0 ml 13.13 ml/m²  LV Vol s, MOD A4C: 28.3 ml 12.82 ml/m²  LV Vol s, MOD BP:  28.9 ml 13.08 ml/m²  LVOT SV MOD BP:    63.4 ml  LV EF% MOD BP:     69 %     MV E Vmax:    1.19 m/s  e' lateral:   13.10 cm/s  e'medial:    10.70 cm/s  E/e' lateral: 9.05  E/e' medial:  12.00  E/e' Average: 9.96  MV DT:        148 msec    Aorta Measurements: (Normal range) (Indexed to BSA)     Ao Root d 3.23 cm (3.1 - 3.7 cm) 1.46 cm/m²            Left Atrium Measurements: (Indexed to BSA)  LA Diam 2D: 4.72 cm         Right Atrial Measurements:     RA Vol:       59.00 ml  RA Vol Index: 26.74 ml/m²       LVOT / RVOT/ Qp/Qs Data: (Indexed to BSA)  LVOT Diameter: 2.29 cm  LVOT Area:     4.12 cm²    Mitral Valve Measurements:     MV E Vmax: 1.2 m/s       Tricuspid Valve Measurements:     TR Vmax:          3.0 m/s  TR Peak Gradient: 35.9 mmHg  RA Pressure:      3 mmHg  PASP:             39 mmHg    ________________________________________________________________________________________  Electronically signed on 9/6/2024 at 10:38:59 AM by Claudette Jacobs         *** Final ***

## 2024-09-13 NOTE — PROGRESS NOTE ADULT - SUBJECTIVE AND OBJECTIVE BOX
Date of Service 09-13-24 @ 14:46    Patient is a 54y old  Male who presents with a chief complaint of AF (13 Sep 2024 11:42)      INTERVAL /OVERNIGHT EVENTS: feels ok, poor compliance with medical follow ups    MEDICATIONS  (STANDING):  aMIOdarone    Tablet 200 milliGRAM(s) Oral daily  aMIOdarone    Tablet   Oral   ascorbic acid 500 milliGRAM(s) Oral daily  aspirin enteric coated 81 milliGRAM(s) Oral daily  atorvastatin 40 milliGRAM(s) Oral at bedtime  cetirizine 10 milliGRAM(s) Oral daily  cholecalciferol 1000 Unit(s) Oral daily  dextrose 5%. 1000 milliLiter(s) (100 mL/Hr) IV Continuous <Continuous>  dextrose 5%. 1000 milliLiter(s) (50 mL/Hr) IV Continuous <Continuous>  dextrose 50% Injectable 25 Gram(s) IV Push once  dextrose 50% Injectable 12.5 Gram(s) IV Push once  dextrose 50% Injectable 25 Gram(s) IV Push once  dextrose Oral Gel 15 Gram(s) Oral once  digoxin     Tablet 125 MICROGram(s) Oral daily  enoxaparin Injectable 100 milliGRAM(s) SubCutaneous every 12 hours  famotidine    Tablet 20 milliGRAM(s) Oral daily  ferrous    sulfate 325 milliGRAM(s) Oral two times a day  gabapentin 100 milliGRAM(s) Oral three times a day  glucagon  Injectable 1 milliGRAM(s) IntraMuscular once  insulin lispro (ADMELOG) corrective regimen sliding scale   SubCutaneous at bedtime  insulin lispro (ADMELOG) corrective regimen sliding scale   SubCutaneous three times a day before meals  melatonin 5 milliGRAM(s) Oral at bedtime  metoprolol tartrate 12.5 milliGRAM(s) Oral every 6 hours  midodrine. 15 milliGRAM(s) Oral three times a day  multivitamin 1 Tablet(s) Oral daily  mupirocin 2% Nasal 1 Application(s) Both Nostrils two times a day  naloxegol 25 milliGRAM(s) Oral daily  oxyCODONE  ER Tablet 10 milliGRAM(s) Oral every 12 hours  pantoprazole    Tablet 40 milliGRAM(s) Oral before breakfast  polyethylene glycol 3350 17 Gram(s) Oral daily  senna 2 Tablet(s) Oral at bedtime  sucralfate 1 Gram(s) Oral two times a day  tiotropium 2.5 MICROgram(s)/olodaterol 2.5 MICROgram(s) (STIOLTO) Inhaler 2 Puff(s) Inhalation daily  trimethoprim  160 mG/sulfamethoxazole 800 mG 1 Tablet(s) Oral two times a day    MEDICATIONS  (PRN):  acetaminophen     Tablet .. 650 milliGRAM(s) Oral every 6 hours PRN Temp greater or equal to 38C (100.4F)  HYDROmorphone  Injectable 1 milliGRAM(s) IV Push daily PRN Severe Pain (7 - 10)  oxyCODONE    IR 2.5 milliGRAM(s) Oral every 4 hours PRN Moderate Pain (4 - 6)  sodium chloride 0.65% Nasal 1 Spray(s) Both Nostrils daily PRN Nasal Congestion  traMADol 50 milliGRAM(s) Oral every 4 hours PRN Mild Pain (1 - 3)      Allergies    fish (Hives)  ertapenem (Blisters; Rash)    Intolerances        REVIEW OF SYSTEMS:  CONSTITUTIONAL: No fever, weight loss, or fatigue  EYES: No eye pain, visual disturbances, or discharge  ENMT:  No difficulty hearing, tinnitus, vertigo; No sinus or throat pain  NECK: No pain or stiffness  RESPIRATORY: No cough, wheezing, chills or hemoptysis; No shortness of breath  CARDIOVASCULAR: No chest pain, palpitations, dizziness, or leg swelling  GASTROINTESTINAL: No abdominal or epigastric pain. No nausea, vomiting, or hematemesis; No diarrhea or constipation. No melena or hematochezia.  GENITOURINARY: No dysuria, frequency, hematuria, or incontinence  NEUROLOGICAL: No headaches, memory loss, loss of strength, numbness, or tremors  SKIN: No itching, burning, rashes, or lesions   LYMPH NODES: No enlarged glands  ENDOCRINE: No heat or cold intolerance; No hair loss; No polydipsia or polyuria  MUSCULOSKELETAL: No joint pain or swelling; No muscle, back, or extremity pain  PSYCHIATRIC: No depression, anxiety, mood swings, or difficulty sleeping  HEME/LYMPH: No easy bruising, or bleeding gums  ALLERGY AND IMMUNOLOGIC: No hives or eczema    Vital Signs Last 24 Hrs  T(C): 36.4 (13 Sep 2024 05:49), Max: 36.8 (12 Sep 2024 20:10)  T(F): 97.6 (13 Sep 2024 05:49), Max: 98.3 (12 Sep 2024 20:10)  HR: 79 (13 Sep 2024 12:28) (70 - 80)  BP: 99/65 (13 Sep 2024 12:28) (99/65 - 126/87)  BP(mean): --  RR: 18 (13 Sep 2024 05:49) (18 - 18)  SpO2: 94% (13 Sep 2024 05:49) (94% - 97%)    Parameters below as of 13 Sep 2024 05:49  Patient On (Oxygen Delivery Method): room air        PHYSICAL EXAM:  GENERAL: NAD, well-groomed, well-developed  HEAD:  Atraumatic, Normocephalic  EYES: EOMI, PERRLA, conjunctiva and sclera clear  ENMT: No tonsillar erythema, exudates, or enlargement; Moist mucous membranes, Good dentition, No lesions  NECK: Supple, No JVD, Normal thyroid  NERVOUS SYSTEM:  Alert & Oriented X3, Good concentration; Motor Strength 5/5 B/L upper and lower extremities; DTRs 2+ intact and symmetric  CHEST/LUNG: Clear to auscultation bilaterally; No rales, rhonchi, wheezing, or rubs  HEART: Regular rate and rhythm; No murmurs, rubs, or gallops  ABDOMEN: Soft, Nontender, Nondistended; Bowel sounds present  EXTREMITIES:  right foot in surgical bandage  LYMPH: No lymphadenopathy noted  SKIN: No rashes or lesions    LABS:                        8.7    11.32 )-----------( 404      ( 13 Sep 2024 10:18 )             26.7     13 Sep 2024 10:18    139    |  109    |  9      ----------------------------<  168    4.0     |  23     |  0.86     Ca    7.7        13 Sep 2024 10:18    TPro  4.7    /  Alb  1.4    /  TBili  0.2    /  DBili  x      /  AST  26     /  ALT  24     /  AlkPhos  95     13 Sep 2024 10:18      Urinalysis Basic - ( 13 Sep 2024 10:18 )    Color: x / Appearance: x / SG: x / pH: x  Gluc: 168 mg/dL / Ketone: x  / Bili: x / Urobili: x   Blood: x / Protein: x / Nitrite: x   Leuk Esterase: x / RBC: x / WBC x   Sq Epi: x / Non Sq Epi: x / Bacteria: x      CAPILLARY BLOOD GLUCOSE      POCT Blood Glucose.: 163 mg/dL (13 Sep 2024 12:08)  POCT Blood Glucose.: 140 mg/dL (13 Sep 2024 08:16)  POCT Blood Glucose.: 144 mg/dL (12 Sep 2024 21:48)  POCT Blood Glucose.: 173 mg/dL (12 Sep 2024 17:10)      RADIOLOGY & ADDITIONAL TESTS:    Notes Reviewed:  [x ] YES  [ ] NO    Care Discussed with Consultants/Other Providers [x ] YES  [ ] NO

## 2024-09-13 NOTE — GOALS OF CARE CONVERSATION - ADVANCED CARE PLANNING - CONVERSATION DETAILS
Writer met with pt at bedside. Reviewed patient's medical and social history as well as events leading to patient's hospitalization. Writer discussed patient's current diagnosis (Rapid at. fib, DM, right foot ulcer, acute sepsis, anemia, hypotension,CAD), medical condition and management, prognosis. Pt did not want to discuss HCP or any advance directives at this time.Copy of HCP info left at bedside. Psychosocial support provided.

## 2024-09-13 NOTE — CONSULT NOTE ADULT - SUBJECTIVE AND OBJECTIVE BOX
Patient is a 54y old  Male who presents with a chief complaint of AF (12 Sep 2024 15:27)      HPI:  53yo male bib ems from wound care with hypotension and poss gangrenous foot, being treated after having amputations, pt denies fever, chills, has no pain  In ER patient was found to be in hypotension and rapid AF.  patient is being admitted for further work up and treatment.   (05 Sep 2024 17:06)       ROS:  Negative except for:    PAST MEDICAL & SURGICAL HISTORY:  Diabetes      Diabetes mellitus with no complication      Afib      Hypertension      BPH (benign prostatic hyperplasia)      Perforated gastric ulcer  s/p emergent ex-lap omentopexy and plication 6/2019      Pulmonary embolism      History of non-ST elevation myocardial infarction (NSTEMI)      Osteomyelitis  s/p debridement      CAD S/P percutaneous coronary angioplasty      Cerebrovascular accident      H/O abdominal surgery      Perforated gastric ulcer      Traumatic amputation of left foot, initial encounter          SOCIAL HISTORY:    FAMILY HISTORY:  FH: pulmonary embolism  Mother    FH: coronary artery disease  Father    FH: stroke  Father        MEDICATIONS  (STANDING):  aMIOdarone    Tablet   Oral   aMIOdarone    Tablet 200 milliGRAM(s) Oral daily  ascorbic acid 500 milliGRAM(s) Oral daily  aspirin enteric coated 81 milliGRAM(s) Oral daily  atorvastatin 40 milliGRAM(s) Oral at bedtime  cetirizine 10 milliGRAM(s) Oral daily  cholecalciferol 1000 Unit(s) Oral daily  dextrose 5%. 1000 milliLiter(s) (100 mL/Hr) IV Continuous <Continuous>  dextrose 5%. 1000 milliLiter(s) (50 mL/Hr) IV Continuous <Continuous>  dextrose 50% Injectable 12.5 Gram(s) IV Push once  dextrose 50% Injectable 25 Gram(s) IV Push once  dextrose 50% Injectable 25 Gram(s) IV Push once  dextrose Oral Gel 15 Gram(s) Oral once  digoxin     Tablet 125 MICROGram(s) Oral daily  famotidine    Tablet 20 milliGRAM(s) Oral daily  ferrous    sulfate 325 milliGRAM(s) Oral two times a day  gabapentin 100 milliGRAM(s) Oral three times a day  glucagon  Injectable 1 milliGRAM(s) IntraMuscular once  insulin lispro (ADMELOG) corrective regimen sliding scale   SubCutaneous at bedtime  insulin lispro (ADMELOG) corrective regimen sliding scale   SubCutaneous three times a day before meals  melatonin 5 milliGRAM(s) Oral at bedtime  metoprolol tartrate 12.5 milliGRAM(s) Oral every 6 hours  midodrine. 15 milliGRAM(s) Oral three times a day  multivitamin 1 Tablet(s) Oral daily  mupirocin 2% Nasal 1 Application(s) Both Nostrils two times a day  naloxegol 25 milliGRAM(s) Oral daily  oxyCODONE  ER Tablet 10 milliGRAM(s) Oral every 12 hours  pantoprazole    Tablet 40 milliGRAM(s) Oral before breakfast  polyethylene glycol 3350 17 Gram(s) Oral daily  senna 2 Tablet(s) Oral at bedtime  sucralfate 1 Gram(s) Oral two times a day  tiotropium 2.5 MICROgram(s)/olodaterol 2.5 MICROgram(s) (STIOLTO) Inhaler 2 Puff(s) Inhalation daily  trimethoprim  160 mG/sulfamethoxazole 800 mG 1 Tablet(s) Oral two times a day    MEDICATIONS  (PRN):  acetaminophen     Tablet .. 650 milliGRAM(s) Oral every 6 hours PRN Temp greater or equal to 38C (100.4F)  HYDROmorphone  Injectable 1 milliGRAM(s) IV Push daily PRN Severe Pain (7 - 10)  oxyCODONE    IR 2.5 milliGRAM(s) Oral every 4 hours PRN Moderate Pain (4 - 6)  sodium chloride 0.65% Nasal 1 Spray(s) Both Nostrils daily PRN Nasal Congestion  traMADol 50 milliGRAM(s) Oral every 4 hours PRN Mild Pain (1 - 3)      Allergies    fish (Hives)  ertapenem (Blisters; Rash)    Intolerances        Vital Signs Last 24 Hrs  T(C): 36.4 (13 Sep 2024 05:49), Max: 36.8 (12 Sep 2024 20:10)  T(F): 97.6 (13 Sep 2024 05:49), Max: 98.3 (12 Sep 2024 20:10)  HR: 73 (13 Sep 2024 05:49) (70 - 80)  BP: 107/69 (13 Sep 2024 05:49) (107/69 - 126/87)  BP(mean): --  RR: 18 (13 Sep 2024 05:49) (18 - 18)  SpO2: 94% (13 Sep 2024 05:49) (94% - 97%)    Parameters below as of 13 Sep 2024 05:49  Patient On (Oxygen Delivery Method): room air        PHYSICAL EXAM  General: adult in NAD  HEENT: clear oropharynx, anicteric sclera, pink conjunctivae  Neck: supple  CV: normal S1S2 with no murmur rubs or gallops  Lungs: clear to auscultation, no wheezes, no rhales  Abdomen: soft non-tender non-distended, no hepato/splenomegaly  Ext: no clubbing cyanosis or edema  Skin: no rashes and no petichiae  Neuro: alert and oriented X3 no focal deficits      LABS:    CBC Full  -  ( 12 Sep 2024 06:10 )  WBC Count : 10.01 K/uL  RBC Count : 2.41 M/uL  Hemoglobin : 7.3 g/dL  Hematocrit : 22.4 %  Platelet Count - Automated : 418 K/uL  Mean Cell Volume : 92.9 fl  Mean Cell Hemoglobin : 30.3 pg  Mean Cell Hemoglobin Concentration : 32.6 gm/dL  Auto Neutrophil # : x  Auto Lymphocyte # : x  Auto Monocyte # : x  Auto Eosinophil # : x  Auto Basophil # : x  Auto Neutrophil % : x  Auto Lymphocyte % : x  Auto Monocyte % : x  Auto Eosinophil % : x  Auto Basophil % : x    09-12    142  |  111<H>  |  14  ----------------------------<  114<H>  4.1   |  25  |  1.10    Ca    7.7<L>      12 Sep 2024 06:10  Mg     1.8     09-12        Ferritin: 493 ng/mL (09-07 @ 06:20)  Iron - Total Binding Capacity.: 95 ug/dL (09-07 @ 06:20)          BLOOD SMEAR INTERPRETATION:    RADIOLOGY & ADDITIONAL STUDIES:    < from: CT Abdomen and Pelvis No Cont (09.10.24 @ 20:59) >  ACC: [id number] EXAM:  CT ABDOMEN AND PELVIS   ORDERED BY: MIRLANDE MAYS     PROCEDURE DATE:  09/10/2024          INTERPRETATION:  CLINICAL INFORMATION: Abdominal pain    COMPARISON: June 14, 2024    CONTRAST/COMPLICATIONS:  IV Contrast: NONE  Oral Contrast: NONE  Complications: None reported at time of study completion    PROCEDURE:  CT of the Abdomen and Pelvis was performed.  Sagittal and coronal reformats were performed.    FINDINGS:  LOWER CHEST: Small bilateral pleural effusions, left side greater than   right. Coronary artery calcifications. Small pericardial effusion.    LIVER: Diffusely heterogeneous liver. Full evaluation of the liver is   limited without intravenous contrast.  BILE DUCTS: Normal caliber.  GALLBLADDER: Distended but otherwise unremarkable.  SPLEEN: 16 mm hypoattenuating splenic lesion, indeterminate.  PANCREAS: Within normal limits.  ADRENALS: Within normal limits.  KIDNEYS/URETERS: Within normal limits.    BLADDER: Collapsed around a Aguilera catheter balloon.  REPRODUCTIVE ORGANS: Prostate within normal limits.    BOWEL: No bowel obstruction. Appendix is normal.  PERITONEUM/RETROPERITONEUM: Within normal limits.  VESSELS: Atherosclerotic changes.  LYMPH NODES: No lymphadenopathy.  ABDOMINAL WALL: Small fat-containing umbilical hernia.  BONES: Within normal limits.    IMPRESSION:  No acute findings or cause for pain identified.    Heterogeneous liver. Differential diagnosis includes steatosis or   multiple liver lesions. Recommend contrast-enhanced MRI to better   evaluate.    16 mm indeterminate splenic lesion. This could also be evaluated with MRI.    --- End of Report ---            AMANDA MARTIENZ MD; Attending Radiologist  This document has been electronically signed. Sep 11 2024  3:12PM    < end of copied text >

## 2024-09-13 NOTE — PROGRESS NOTE ADULT - SUBJECTIVE AND OBJECTIVE BOX
VASCULAR SURGERY PROGRESS NOTE      s/p Partial Right calcanectomy and soft tissue debridement by Podiatry      SUBJECTIVE:  Patient examined at bedside, reports pain of the right heel area  Denies fevers   No nausea, no vomiting  Tolerating diet  White count 11.3, slightly up from yesterday (10)      OBJECTIVE:  VITALS:  T(F): 97.6 (09-13-24 @ 05:49), Max: 98.3 (09-12-24 @ 20:10)  HR: 99 (09-13-24 @ 17:35) (70 - 99)  BP: 109/68 (09-13-24 @ 17:35) (99/65 - 126/87)  RR: 18 (09-13-24 @ 05:49) (18 - 18)  SpO2: 94% (09-13-24 @ 05:49) (94% - 97%)        09-12 @ 07:01  -  09-13 @ 07:00  --------------------------------------------------------  IN:  Total IN: 0 mL    OUT:    Indwelling Catheter - Urethral (mL): 1950 mL  Total OUT: 1950 mL    Total NET: -1950 mL      LABS:                        8.7    x     )-----------( x        ( 13 Sep 2024 16:05 )             27.6     09-13    139  |  109<H>  |  9   ----------------------------<  168<H>  4.0   |  23  |  0.86    Ca    7.7<L>      13 Sep 2024 10:18  Mg     1.8     09-12    TPro  4.7<L>  /  Alb  1.4<L>  /  TBili  0.2  /  DBili  x   /  AST  26  /  ALT  24  /  AlkPhos  95  09-13        PHYSICAL EXAM:   General: Alert, awake, responsive, No acute distress  Skin: No jaundice, no icterus  Respiratory: clear to auscultation bilaterally, no wheezes / rhonchi / rales, trachea midline  Cardiac: S1 & S2 present, rate and rhythm are regular  Abdomen: soft, nontender, nondistended, no rebound tenderness, no guarding, no palpable masses  Extremities: non edematous, no calf pain bilaterally. Right heel dressing was taken down and changed, foul smelling, some areas of necrotic soft tissue, other areas with fibrinous exudate and some areas of healthy tissue. Skin edges are pink, no erythema, no crepitus. Palpable but right femoral pulse feels somewhat diminished. Unable to palpate pop pulse on the right.

## 2024-09-13 NOTE — PROGRESS NOTE ADULT - SUBJECTIVE AND OBJECTIVE BOX
[facility] DIV[facility] of INFECTIOUS DISEASE  Juventino Whitten MD PhD, Symone Hickey MD, Anne-Marie Li MD, Jeffery Jacobs MD, Ryan Romeo MD  and providing coverage with Melody Armstrong MD  Providing Infectious Disease Consultations at [facility], [facility], [facility]'s    Office# [phone] to schedule follow up appointments  Answering Service for urgent calls or New Consults [phone]  Cell# to text for urgent issues Juventino Whitten [phone]     infectious diseases progress note:    SARAH MORRISON is a 54y y. o. Male patient    Overnight and events of the last 24hrs reviewed    Allergies    fish (Hives)  ertapenem (Blisters; Rash)    Intolerances        ANTIBIOTICS/RELEVANT:  antimicrobials  trimethoprim  160 mG/sulfamethoxazole 800 mG 1 Tablet(s) Oral two times a day    immunologic:    OTHER:  acetaminophen     Tablet .. 650 milliGRAM(s) Oral every 6 hours PRN  aMIOdarone    Tablet   Oral   aMIOdarone    Tablet 200 milliGRAM(s) Oral daily  ascorbic acid 500 milliGRAM(s) Oral daily  aspirin enteric coated 81 milliGRAM(s) Oral daily  atorvastatin 40 milliGRAM(s) Oral at bedtime  cetirizine 10 milliGRAM(s) Oral daily  cholecalciferol 1000 Unit(s) Oral daily  dextrose 5%. 1000 milliLiter(s) IV Continuous <Continuous>  dextrose 5%. 1000 milliLiter(s) IV Continuous <Continuous>  dextrose 50% Injectable 12.5 Gram(s) IV Push once  dextrose 50% Injectable 25 Gram(s) IV Push once  dextrose 50% Injectable 25 Gram(s) IV Push once  dextrose Oral Gel 15 Gram(s) Oral once  digoxin     Tablet 125 MICROGram(s) Oral daily  enoxaparin Injectable 100 milliGRAM(s) SubCutaneous every 12 hours  famotidine    Tablet 20 milliGRAM(s) Oral daily  ferrous    sulfate 325 milliGRAM(s) Oral two times a day  gabapentin 100 milliGRAM(s) Oral three times a day  glucagon  Injectable 1 milliGRAM(s) IntraMuscular once  HYDROmorphone  Injectable 1 milliGRAM(s) IV Push daily PRN  insulin lispro (ADMELOG) corrective regimen sliding scale   SubCutaneous at bedtime  insulin lispro (ADMELOG) corrective regimen sliding scale   SubCutaneous three times a day before meals  melatonin 5 milliGRAM(s) Oral at bedtime  metoprolol tartrate 12.5 milliGRAM(s) Oral every 6 hours  midodrine. 15 milliGRAM(s) Oral three times a day  multivitamin 1 Tablet(s) Oral daily  mupirocin 2% Nasal 1 Application(s) Both Nostrils two times a day  naloxegol 25 milliGRAM(s) Oral daily  oxyCODONE    IR 2.5 milliGRAM(s) Oral every 4 hours PRN  oxyCODONE  ER Tablet 10 milliGRAM(s) Oral every 12 hours  pantoprazole    Tablet 40 milliGRAM(s) Oral before breakfast  polyethylene glycol 3350 17 Gram(s) Oral daily  senna 2 Tablet(s) Oral at bedtime  sodium chloride 0.65% Nasal 1 Spray(s) Both Nostrils daily PRN  sucralfate 1 Gram(s) Oral two times a day  tiotropium 2.5 MICROgram(s)/olodaterol 2.5 MICROgram(s) (STIOLTO) Inhaler 2 Puff(s) Inhalation daily  traMADol 50 milliGRAM(s) Oral every 4 hours PRN      Objective:  Vital Signs Last 24 Hrs  T(C): 36.4 (13 Sep 2024 05:49), Max: 36.8 (12 Sep 2024 20:10)  T(F): 97.6 (13 Sep 2024 05:49), Max: 98.3 (12 Sep 2024 20:10)  HR: 73 (13 Sep 2024 05:49) (70 - 80)  BP: 107/69 (13 Sep 2024 05:49) (107/69 - 126/87)  BP(mean): --  RR: 18 (13 Sep 2024 05:49) (18 - 18)  SpO2: 94% (13 Sep 2024 05:49) (94% - 97%)    Parameters below as of 13 Sep 2024 05:49  Patient On (Oxygen Delivery Method): room air        T(C): 36.4 (09-13-24 @ 05:49), Max: 36.9 (09-11-24 @ 14:17)  T(C): 36.4 (09-13-24 @ 05:49), Max: 36.9 (09-11-24 @ 11:33)  T(C): 36.4 (09-13-24 @ 05:49), Max: 37.3 (09-09-24 @ 12:28)    PHYSICAL EXAM:  HEENT: NC atraumatic  Neck: supple  Respiratory: no accessory muscle use, breathing comfortably  Cardiovascular: distant  Gastrointestinal: normal appearing, nondistended  Extremities: no clubbing, no cyanosis,        LABS:                          8.7    11.32 )-----------( 404      ( 13 Sep 2024 10:18 )             26.7       WBC  11.32 09-13 @ 10:18  10.01 09-12 @ 06:10  10.15 09-09 @ 05:25  11.70 09-08 @ 06:15  13.00 09-07 @ 06:20  10.96 09-06 @ 12:14      09-12    142  |  111<H>  |  14  ----------------------------<  114<H>  4.1   |  25  |  1.10    Ca    7.7<L>      12 Sep 2024 06:10  Mg     1.8     09-12        Creatinine: 1.10 mg/dL (09-12-24 @ 06:10)  Creatinine: 0.68 mg/dL (09-09-24 @ 05:25)  Creatinine: 0.67 mg/dL (09-08-24 @ 06:15)  Creatinine: 0.63 mg/dL (09-07-24 @ 06:20)        Urinalysis Basic - ( 12 Sep 2024 06:10 )    Color: x / Appearance: x / SG: x / pH: x  Gluc: 114 mg/dL / Ketone: x  / Bili: x / Urobili: x   Blood: x / Protein: x / Nitrite: x   Leuk Esterase: x / RBC: x / WBC x   Sq Epi: x / Non Sq Epi: x / Bacteria: x            INFLAMMATORY MARKERS      MICROBIOLOGY:    Culture - Tissue with Gram Stain (09.10.24 @ 12:30)    -  Oxacillin: R >2   -  Penicillin: R >8   -  Piperacillin/Tazobactam: R <=8   -  Rifampin: S <=1 Should not be used as monotherapy   -  Tetracycline: R >8   -  Tobramycin: S <=2   -  Trimethoprim/Sulfamethoxazole: S <=0.5/9.5   -  Trimethoprim/Sulfamethoxazole: S <=0.5/9.5   -  Vancomycin: S 1   Gram Stain:   No polymorphonuclear leukocytes seen per low power field  Numerous Gram Variable Rods seen per oil power field  Moderate Gram positive cocci in pairs seen per oil power field   -  Ampicillin: R >16 These ampicillin results predict results for amoxicillin   -  Ampicillin/Sulbactam: R 8/4   -  Aztreonam: R >16   -  Cefazolin: R >16   -  Cefepime: R >16   -  Ceftriaxone: R >32   -  Ciprofloxacin: R >2   -  Clindamycin: S <=0.25   -  Daptomycin: S <=0.25   -  Ertapenem: S <=0.5   -  Erythromycin: S <=0.25   -  Gentamicin: R >8 Should not be used as monotherapy   -  Gentamicin: S <=2   -  Imipenem: S <=1   -  Levofloxacin: R >4   -  Linezolid: S 2   -  Meropenem: S <=1   Specimen Source: Tissue Other, RIGHT FOOT SOFT TISSUE CULTURE   Culture Results:   Culture yields >4 types of aerobic and/or anaerobic bacteria  Call client services within 7 days if further workup is clinically  indicated.  Culture includes  Moderate Methicillin Resistant Staphylococcus aureus  Numerous Escherichia coli ESBL   Organism Identification: Escherichia coli ESBL  Methicillin resistant Staphylococcus aureus   Organism: Escherichia coli ESBL   Organism: Methicillin resistant Staphylococcus aureus   Method Type: BAYLEE   Method Type: BAYLEE    Culture - Tissue with Gram Stain (09.10.24 @ 12:30)    Gram Stain:   No polymorphonuclear leukocytes seen per low power field  Few Gram Negative Rods seen per oil power field  Rare Gram positive cocci in pairs seen per oil power field   -  Ampicillin: R >16 These ampicillin results predict results for amoxicillin   -  Amoxicillin/Clavulanic Acid: R >16/8   -  Ampicillin: R >16 These ampicillin results predict results for amoxicillin   -  Meropenem: S <=1   -  Meropenem: S <=1   -  Levofloxacin: R >4   -  Levofloxacin: R >4   -  Imipenem: S <=1   -  Gentamicin: S <=2   -  Ertapenem: S <=0.5   -  Ertapenem: S <=0.5   -  Ciprofloxacin: R >2   -  Ciprofloxacin: R >2   -  Ceftriaxone: R >32   -  Ceftriaxone: R 8   -  Cefepime: S <=2   -  Cefoxitin: S <=8   -  Cefepime: R >16   -  Cefazolin: R >16   -  Cefazolin: R >16   -  Aztreonam: R >16   -  Ampicillin/Sulbactam: I 16/8   -  Ampicillin/Sulbactam: R 8/4   -  Aztreonam: S <=4   -  Trimethoprim/Sulfamethoxazole: S <=0.5/9.5   -  Trimethoprim/Sulfamethoxazole: S <=0.5/9.5   -  Tobramycin: S <=2   -  Piperacillin/Tazobactam: S <=8   -  Piperacillin/Tazobactam: R <=8   Specimen Source: Tissue Other, RIGHT FOOT CALCANEOUS BONE   Culture Results:   Culture yields >4 types of aerobic and/or anaerobic bacteria  Call client services within 7 days if further workup is clinically  indicated. Culture includes  Numerous Escherichia coli ESBL  Rare Providencia stuartii  Rare Staphylococcus aureus   Organism Identification: Escherichia coli ESBL  Providencia stuartii   Organism: Providencia stuartii   Organism: Escherichia coli ESBL   Method Type: BAYLEE   Method Type: BAYLEE        RADIOLOGY & ADDITIONAL STUDIES:

## 2024-09-13 NOTE — CONSULT NOTE ADULT - ASSESSMENT
Anemia most likely related to diabetic foot ulcer. r/o osteomyelitis.    has received 3 units PRBC since admission  expect patient to be transfusion dependent until infection resolves  fe studies c/w diagnosis    Recommendations:  1.  follow CBC and transfuse as indicated  2.  continue ID/podiatry management  3.  is not a candidate for JHOANA treatment  4.  further  heme recommendations pending above

## 2024-09-13 NOTE — CASE MANAGEMENT PROGRESS NOTE - NSCMPROGRESSNOTE_GEN_ALL_CORE
Discussed pt in rounds, pt on PO antibiotics. S/P debridement and Partial calcanectomy, Awaiting pathology. SW following for return to [facility].

## 2024-09-13 NOTE — SOCIAL WORK PROGRESS NOTE - NSSWPROGRESSNOTE_GEN_ALL_CORE
As per MD, anticipated DC to [facility] for 9/14/24, facility will accept over weekend if medically cleared, facility will obtain auth as per their admissions, chart copied in office. SW to continue to follow to ensure safe transition upon Dc.

## 2024-09-13 NOTE — PROGRESS NOTE ADULT - ASSESSMENT
55 YO M with past medical history of AFib (on Eliquis), s/p cardiac arrest x 2 w/ AHRF (on recent admission), indwelling Aguilera, cerebral aneurysm, CAD (s/p stents), CVA, T2DM, HTN, OM (s/p debridement), PE, perforated gastric ulcer s/p ometnopexy 2019 with Dr. Mejia who presents with possible gangrenous right foot. In ED pt found to be in afib with RVR and hypotension.    Afib with RVR  - Likely, in the setting of septic shock from gangrenous foot.   - S/p Cardizem gtt and Dig load.  Amiodarone PO load (5Gm)  - tele w/ A fib 80-100s, no events x 48 hours can dc tele   - Continue  amio 200 mg PO qd  - Continue digoxin 125 pO QD.  Dig level = 1.1 (9/9)  - Continue metoprolol 12.5 QID. Plan to switch to long-acting  - Pt on Eliquis at home; held for Podia procedure  - Would continue to hold as Hgb trending down, resume when able   - Would repeat and transfuse as needed to keep Hgb ~8  - Monitor and replete Lytes. Keep K > 4 and Mg > 2    - TTE showed normal LVF with mod-severe MR  - No evidence of volume overload    - BP mostly stable and controlled   - Continue Midodrine 15mg TID for bp support    - With known Hx of CAD.  EKG nonischemic  - No evidence of any active ischemia   - Continue ASA and statin.      - S/P partial calcanectomy of right foot, 9/10, pending path, Podia following   - Abx per ID    - Will continue to follow.    Jennifer Gudino [facility]  Nurse Practitioner - Cardiology   call TEAMS

## 2024-09-13 NOTE — PROGRESS NOTE ADULT - PROBLEM SELECTOR PLAN 1
S/P IV ABX - zosyn per ID  ID eval with dr. lan appreciated  ? OM  ? MR  S/p debridement and partial calcanectomy per podiatry   may need BKA  vascular eval

## 2024-09-13 NOTE — PROVIDER CONTACT NOTE (CRITICAL VALUE NOTIFICATION) - TEST AND RESULT REPORTED:
Hgb 7 & Hematocrit 24.1
hemoglobin 6.9
Lactate 2.7
Magnesium 1.0
Tissue cx on 9/10 from R foot calcaneous bone. Final report >4 types of aerobic and/or anaerobic bacteria include e coli, ESB L, rare MRSA.

## 2024-09-14 LAB
GLUCOSE BLDC GLUCOMTR-MCNC: 120 MG/DL — HIGH (ref 70–99)
GLUCOSE BLDC GLUCOMTR-MCNC: 148 MG/DL — HIGH (ref 70–99)
GLUCOSE BLDC GLUCOMTR-MCNC: 165 MG/DL — HIGH (ref 70–99)
GLUCOSE BLDC GLUCOMTR-MCNC: 219 MG/DL — HIGH (ref 70–99)
HCT VFR BLD CALC: 28.2 % — LOW (ref 39–50)
HGB BLD-MCNC: 8.6 G/DL — LOW (ref 13–17)
MCHC RBC-ENTMCNC: 28.9 PG — SIGNIFICANT CHANGE UP (ref 27–34)
MCHC RBC-ENTMCNC: 30.5 GM/DL — LOW (ref 32–36)
MCV RBC AUTO: 94.6 FL — SIGNIFICANT CHANGE UP (ref 80–100)
NRBC # BLD: 0 /100 WBCS — SIGNIFICANT CHANGE UP (ref 0–0)
PLATELET # BLD AUTO: 398 K/UL — SIGNIFICANT CHANGE UP (ref 150–400)
RBC # BLD: 2.98 M/UL — LOW (ref 4.2–5.8)
RBC # FLD: 19.5 % — HIGH (ref 10.3–14.5)
WBC # BLD: 9.13 K/UL — SIGNIFICANT CHANGE UP (ref 3.8–10.5)
WBC # FLD AUTO: 9.13 K/UL — SIGNIFICANT CHANGE UP (ref 3.8–10.5)

## 2024-09-14 PROCEDURE — 99232 SBSQ HOSP IP/OBS MODERATE 35: CPT

## 2024-09-14 RX ADMIN — Medication 1: at 08:23

## 2024-09-14 RX ADMIN — SULFAMETHOXAZOLE AND TRIMETHOPRIM 1 TABLET(S): 800; 160 TABLET ORAL at 05:18

## 2024-09-14 RX ADMIN — MIDODRINE HYDROCHLORIDE 15 MILLIGRAM(S): 5 TABLET ORAL at 18:48

## 2024-09-14 RX ADMIN — TIOTROPIUM BROMIDE AND OLODATEROL 2 PUFF(S): 3.124; 2.736 SPRAY, METERED RESPIRATORY (INHALATION) at 12:41

## 2024-09-14 RX ADMIN — Medication 2 TABLET(S): at 21:48

## 2024-09-14 RX ADMIN — Medication 325 MILLIGRAM(S): at 05:16

## 2024-09-14 RX ADMIN — Medication 81 MILLIGRAM(S): at 12:42

## 2024-09-14 RX ADMIN — OXYCODONE HYDROCHLORIDE 10 MILLIGRAM(S): 5 TABLET ORAL at 17:19

## 2024-09-14 RX ADMIN — DIGOXIN 125 MICROGRAM(S): 0.12 TABLET ORAL at 05:18

## 2024-09-14 RX ADMIN — OXYCODONE HYDROCHLORIDE 200 MILLIGRAM(S): 15 TABLET ORAL at 05:16

## 2024-09-14 RX ADMIN — Medication 40 MILLIGRAM(S): at 05:18

## 2024-09-14 RX ADMIN — MIDODRINE HYDROCHLORIDE 15 MILLIGRAM(S): 5 TABLET ORAL at 05:17

## 2024-09-14 RX ADMIN — ENOXAPARIN SODIUM 100 MILLIGRAM(S): 100 INJECTION SUBCUTANEOUS at 17:29

## 2024-09-14 RX ADMIN — Medication 1000 UNIT(S): at 12:42

## 2024-09-14 RX ADMIN — POLYETHYLENE GLYCOL 3350 17 GRAM(S): 17 POWDER, FOR SOLUTION ORAL at 17:23

## 2024-09-14 RX ADMIN — SULFAMETHOXAZOLE AND TRIMETHOPRIM 1 TABLET(S): 800; 160 TABLET ORAL at 17:19

## 2024-09-14 RX ADMIN — OXYCODONE HYDROCHLORIDE 2.5 MILLIGRAM(S): 5 TABLET ORAL at 23:27

## 2024-09-14 RX ADMIN — Medication 100 MILLIGRAM(S): at 14:56

## 2024-09-14 RX ADMIN — METOPROLOL TARTRATE 12.5 MILLIGRAM(S): 100 TABLET ORAL at 23:27

## 2024-09-14 RX ADMIN — SUCRALFATE 1 GRAM(S): 1 SUSPENSION ORAL at 17:19

## 2024-09-14 RX ADMIN — METOPROLOL TARTRATE 12.5 MILLIGRAM(S): 100 TABLET ORAL at 17:20

## 2024-09-14 RX ADMIN — Medication 40 MILLIGRAM(S): at 21:49

## 2024-09-14 RX ADMIN — Medication 1 APPLICATION(S): at 19:37

## 2024-09-14 RX ADMIN — METOPROLOL TARTRATE 12.5 MILLIGRAM(S): 100 TABLET ORAL at 05:17

## 2024-09-14 RX ADMIN — FAMOTIDINE 20 MILLIGRAM(S): 10 INJECTION INTRAVENOUS at 12:41

## 2024-09-14 RX ADMIN — Medication 5 MILLIGRAM(S): at 21:49

## 2024-09-14 RX ADMIN — SUCRALFATE 1 GRAM(S): 1 SUSPENSION ORAL at 05:16

## 2024-09-14 RX ADMIN — METOPROLOL TARTRATE 12.5 MILLIGRAM(S): 100 TABLET ORAL at 12:42

## 2024-09-14 RX ADMIN — NALOXEGOL OXALATE 25 MILLIGRAM(S): 25 TABLET, FILM COATED ORAL at 12:43

## 2024-09-14 RX ADMIN — ENOXAPARIN SODIUM 100 MILLIGRAM(S): 100 INJECTION SUBCUTANEOUS at 05:18

## 2024-09-14 RX ADMIN — Medication 325 MILLIGRAM(S): at 18:48

## 2024-09-14 RX ADMIN — Medication 10 MILLIGRAM(S): at 12:42

## 2024-09-14 RX ADMIN — Medication 2: at 12:40

## 2024-09-14 RX ADMIN — Medication 100 MILLIGRAM(S): at 21:49

## 2024-09-14 RX ADMIN — OXYCODONE HYDROCHLORIDE 10 MILLIGRAM(S): 5 TABLET ORAL at 05:15

## 2024-09-14 RX ADMIN — OXYCODONE HYDROCHLORIDE 10 MILLIGRAM(S): 5 TABLET ORAL at 06:34

## 2024-09-14 RX ADMIN — Medication 500 MILLIGRAM(S): at 12:41

## 2024-09-14 RX ADMIN — Medication 100 MILLIGRAM(S): at 05:16

## 2024-09-14 RX ADMIN — MIDODRINE HYDROCHLORIDE 15 MILLIGRAM(S): 5 TABLET ORAL at 14:06

## 2024-09-14 RX ADMIN — Medication 1 TABLET(S): at 12:42

## 2024-09-14 NOTE — PROGRESS NOTE ADULT - SUBJECTIVE AND OBJECTIVE BOX
Interval History:  no new complaints  continues with post op care    Chart reviewed and events noted;   Overnight events:    MEDICATIONS  (STANDING):  aMIOdarone    Tablet 200 milliGRAM(s) Oral daily  aMIOdarone    Tablet   Oral   ascorbic acid 500 milliGRAM(s) Oral daily  aspirin enteric coated 81 milliGRAM(s) Oral daily  atorvastatin 40 milliGRAM(s) Oral at bedtime  cetirizine 10 milliGRAM(s) Oral daily  cholecalciferol 1000 Unit(s) Oral daily  dextrose 5%. 1000 milliLiter(s) (100 mL/Hr) IV Continuous <Continuous>  dextrose 5%. 1000 milliLiter(s) (50 mL/Hr) IV Continuous <Continuous>  dextrose 50% Injectable 12.5 Gram(s) IV Push once  dextrose 50% Injectable 25 Gram(s) IV Push once  dextrose 50% Injectable 25 Gram(s) IV Push once  dextrose Oral Gel 15 Gram(s) Oral once  digoxin     Tablet 125 MICROGram(s) Oral daily  enoxaparin Injectable 100 milliGRAM(s) SubCutaneous every 12 hours  famotidine    Tablet 20 milliGRAM(s) Oral daily  ferrous    sulfate 325 milliGRAM(s) Oral two times a day  gabapentin 100 milliGRAM(s) Oral three times a day  glucagon  Injectable 1 milliGRAM(s) IntraMuscular once  insulin lispro (ADMELOG) corrective regimen sliding scale   SubCutaneous three times a day before meals  insulin lispro (ADMELOG) corrective regimen sliding scale   SubCutaneous at bedtime  melatonin 5 milliGRAM(s) Oral at bedtime  metoprolol tartrate 12.5 milliGRAM(s) Oral every 6 hours  midodrine. 15 milliGRAM(s) Oral three times a day  multivitamin 1 Tablet(s) Oral daily  mupirocin 2% Nasal 1 Application(s) Both Nostrils two times a day  naloxegol 25 milliGRAM(s) Oral daily  oxyCODONE  ER Tablet 10 milliGRAM(s) Oral every 12 hours  pantoprazole    Tablet 40 milliGRAM(s) Oral before breakfast  polyethylene glycol 3350 17 Gram(s) Oral daily  senna 2 Tablet(s) Oral at bedtime  sucralfate 1 Gram(s) Oral two times a day  tiotropium 2.5 MICROgram(s)/olodaterol 2.5 MICROgram(s) (STIOLTO) Inhaler 2 Puff(s) Inhalation daily  trimethoprim  160 mG/sulfamethoxazole 800 mG 1 Tablet(s) Oral two times a day    MEDICATIONS  (PRN):  acetaminophen     Tablet .. 650 milliGRAM(s) Oral every 6 hours PRN Temp greater or equal to 38C (100.4F)  HYDROmorphone  Injectable 1 milliGRAM(s) IV Push daily PRN Severe Pain (7 - 10)  oxyCODONE    IR 2.5 milliGRAM(s) Oral every 4 hours PRN Moderate Pain (4 - 6)  sodium chloride 0.65% Nasal 1 Spray(s) Both Nostrils daily PRN Nasal Congestion  traMADol 50 milliGRAM(s) Oral every 4 hours PRN Mild Pain (1 - 3)      Vital Signs Last 24 Hrs  T(C): 36.9 (14 Sep 2024 19:45), Max: 37.1 (13 Sep 2024 21:15)  T(F): 98.5 (14 Sep 2024 19:45), Max: 98.8 (14 Sep 2024 11:15)  HR: 69 (14 Sep 2024 19:45) (69 - 81)  BP: 102/71 (14 Sep 2024 19:45) (102/71 - 119/76)  BP(mean): --  RR: 18 (14 Sep 2024 19:45) (18 - 18)  SpO2: 94% (14 Sep 2024 19:45) (93% - 99%)    Parameters below as of 14 Sep 2024 19:45  Patient On (Oxygen Delivery Method): room air        PHYSICAL EXAM  General: adult in NAD  HEENT: clear oropharynx, anicteric sclera, pink conjunctivae  Neck: supple  CV: normal S1S2 with no murmur rubs or gallops  Lungs: clear to auscultation, no wheezes, no rhales  Abdomen: soft non-tender non-distended, no hepato/splenomegaly  Ext: no clubbing cyanosis or edema.  bandage not removed  Skin: no rashes and no petichiae  Neuro: alert and oriented X3 no focal deficits      LABS:  CBC Full  -  ( 14 Sep 2024 09:50 )  WBC Count : 9.13 K/uL  RBC Count : 2.98 M/uL  Hemoglobin : 8.6 g/dL  Hematocrit : 28.2 %  Platelet Count - Automated : 398 K/uL  Mean Cell Volume : 94.6 fl  Mean Cell Hemoglobin : 28.9 pg  Mean Cell Hemoglobin Concentration : 30.5 gm/dL  Auto Neutrophil # : x  Auto Lymphocyte # : x  Auto Monocyte # : x  Auto Eosinophil # : x  Auto Basophil # : x  Auto Neutrophil % : x  Auto Lymphocyte % : x  Auto Monocyte % : x  Auto Eosinophil % : x  Auto Basophil % : x    09-13    139  |  109<H>  |  9   ----------------------------<  168<H>  4.0   |  23  |  0.86    Ca    7.7<L>      13 Sep 2024 10:18    TPro  4.7<L>  /  Alb  1.4<L>  /  TBili  0.2  /  DBili  x   /  AST  26  /  ALT  24  /  AlkPhos  95  09-13        fe studies      WBC trend  9.13 K/uL (09-14-24 @ 09:50)  11.32 K/uL (09-13-24 @ 10:18)  10.01 K/uL (09-12-24 @ 06:10)      Hgb trend  8.6 g/dL (09-14-24 @ 09:50)  8.7 g/dL (09-13-24 @ 16:05)  8.7 g/dL (09-13-24 @ 10:18)  7.3 g/dL (09-12-24 @ 06:10)      plt trend  398 K/uL (09-14-24 @ 09:50)  404 K/uL (09-13-24 @ 10:18)  418 K/uL (09-12-24 @ 06:10)        RADIOLOGY & ADDITIONAL STUDIES:

## 2024-09-14 NOTE — PHYSICAL THERAPY INITIAL EVALUATION ADULT - LEVEL OF INDEPENDENCE: SIT/STAND, REHAB EVAL
pt deferred standing/attempts to hop on L foot (+TMA); stating he does not have shoe for L foot/unable to perform

## 2024-09-14 NOTE — PHYSICAL THERAPY INITIAL EVALUATION ADULT - PERTINENT HX OF CURRENT PROBLEM, REHAB EVAL
Per H&P: "53yo male bib ems from wound care with hypotension and poss gangrenous foot, being treated after having amputations, pt denies fever, chills, has no pain. In ER patient was found to be in hypotension and rapid AF."

## 2024-09-14 NOTE — PHYSICAL THERAPY INITIAL EVALUATION ADULT - PATIENT PROFILE REVIEW, REHAB EVAL
PT orders received: out of bed with assistance. Consult with RADU Rodrigues, pt may participate in PT evaluation./yes

## 2024-09-14 NOTE — CHART NOTE - NSCHARTNOTEFT_GEN_A_CORE
Assessment: patient seen for follow up. chart reviewed . hospital course noted   54y old  Male who presents with a chief complaint of AF s/p right foot partial calcanectomy and soft tissue debridement  per MD note may need BKA  9/13 BM  patient reports with good PO intake. reviewed menu with patient       Factors impacting intake: [x ] none [ ] nausea  [ ] vomiting [ ] diarrhea [ ] constipation  [ ]chewing problems [ ] swallowing issues  [ ] other:     Diet Prescription: Diet, Consistent Carbohydrate w/Evening Snack (09-10-24 @ 13:25)    Intake: up to 100% per flow sheet    Current Weight: Weight 9/14 213.8# 9/13 203.9# [facility] admit 211# more consistent with 9/14 weight. + 2 right and left ankle edema    Pertinent Medications: MEDICATIONS  (STANDING):  aMIOdarone    Tablet 200 milliGRAM(s) Oral daily  aMIOdarone    Tablet   Oral   ascorbic acid 500 milliGRAM(s) Oral daily  aspirin enteric coated 81 milliGRAM(s) Oral daily  atorvastatin 40 milliGRAM(s) Oral at bedtime  cetirizine 10 milliGRAM(s) Oral daily  cholecalciferol 1000 Unit(s) Oral daily  dextrose 5%. 1000 milliLiter(s) (100 mL/Hr) IV Continuous <Continuous>  dextrose 5%. 1000 milliLiter(s) (50 mL/Hr) IV Continuous <Continuous>  dextrose 50% Injectable 25 Gram(s) IV Push once  dextrose 50% Injectable 12.5 Gram(s) IV Push once  dextrose 50% Injectable 25 Gram(s) IV Push once  dextrose Oral Gel 15 Gram(s) Oral once  digoxin     Tablet 125 MICROGram(s) Oral daily  enoxaparin Injectable 100 milliGRAM(s) SubCutaneous every 12 hours  famotidine    Tablet 20 milliGRAM(s) Oral daily  ferrous    sulfate 325 milliGRAM(s) Oral two times a day  gabapentin 100 milliGRAM(s) Oral three times a day  glucagon  Injectable 1 milliGRAM(s) IntraMuscular once  insulin lispro (ADMELOG) corrective regimen sliding scale   SubCutaneous at bedtime  insulin lispro (ADMELOG) corrective regimen sliding scale   SubCutaneous three times a day before meals  melatonin 5 milliGRAM(s) Oral at bedtime  metoprolol tartrate 12.5 milliGRAM(s) Oral every 6 hours  midodrine. 15 milliGRAM(s) Oral three times a day  multivitamin 1 Tablet(s) Oral daily  mupirocin 2% Nasal 1 Application(s) Both Nostrils two times a day  naloxegol 25 milliGRAM(s) Oral daily  oxyCODONE  ER Tablet 10 milliGRAM(s) Oral every 12 hours  pantoprazole    Tablet 40 milliGRAM(s) Oral before breakfast  polyethylene glycol 3350 17 Gram(s) Oral daily  senna 2 Tablet(s) Oral at bedtime  sucralfate 1 Gram(s) Oral two times a day  tiotropium 2.5 MICROgram(s)/olodaterol 2.5 MICROgram(s) (STIOLTO) Inhaler 2 Puff(s) Inhalation daily  trimethoprim  160 mG/sulfamethoxazole 800 mG 1 Tablet(s) Oral two times a day    MEDICATIONS  (PRN):  acetaminophen     Tablet .. 650 milliGRAM(s) Oral every 6 hours PRN Temp greater or equal to 38C (100.4F)  HYDROmorphone  Injectable 1 milliGRAM(s) IV Push daily PRN Severe Pain (7 - 10)  oxyCODONE    IR 2.5 milliGRAM(s) Oral every 4 hours PRN Moderate Pain (4 - 6)  sodium chloride 0.65% Nasal 1 Spray(s) Both Nostrils daily PRN Nasal Congestion  traMADol 50 milliGRAM(s) Oral every 4 hours PRN Mild Pain (1 - 3)    Pertinent Labs: Adj Ca WNL POCT 165, 184  Skin: IAD sacrum , left hip stage 2 right foot ulcer    Estimated Needs:   [x ] no change since previous assessment   based on # 86.1kg 1722-2152kcals and 1.2-1.4 gms protein/kg 103-120gms protein  [ ] recalculated:     Previous Nutrition Diagnosis:   [x ] Inadequate Energy Intake [ ]Inadequate Oral Intake [ ] Excessive Energy Intake   [ ] Underweight [x ] Increased Nutrient Needs [ ] Overweight/Obesity   [ ] Altered GI Function [ ] Unintended Weight Loss [ ] Food & Nutrition Related Knowledge Deficit [ ] Malnutrition     Nutrition Diagnosis is [x ] ongoing increased nutrient needs   [x ] resolved  inadequate energy intake [ ] not applicable     New Nutrition Diagnosis: [x ] not applicable       Interventions:   Recommend  [ ] Change Diet To:  [ ] Nutrition Supplement recommend ensure max protein shake daily 150kcals and 30 gms protein per serving left message with MD to activate diet  [ ] Nutrition Support  [x ] Other: recommend Vit C to 500mg BID , trend blood sugars    Monitoring and Evaluation:   [x ] PO intake [ x ] Tolerance to diet prescription [ x ] weights [ x ] labs[ x ] follow up per protocol  [ ] other:

## 2024-09-14 NOTE — PROGRESS NOTE ADULT - SUBJECTIVE AND OBJECTIVE BOX
Date of Service 09-14-24 @ 16:15    Patient is a 54y old  Male who presents with a chief complaint of AF (14 Sep 2024 13:08)      INTERVAL /OVERNIGHT EVENTS: pain better    MEDICATIONS  (STANDING):  aMIOdarone    Tablet 200 milliGRAM(s) Oral daily  aMIOdarone    Tablet   Oral   ascorbic acid 500 milliGRAM(s) Oral daily  aspirin enteric coated 81 milliGRAM(s) Oral daily  atorvastatin 40 milliGRAM(s) Oral at bedtime  cetirizine 10 milliGRAM(s) Oral daily  cholecalciferol 1000 Unit(s) Oral daily  dextrose 5%. 1000 milliLiter(s) (100 mL/Hr) IV Continuous <Continuous>  dextrose 5%. 1000 milliLiter(s) (50 mL/Hr) IV Continuous <Continuous>  dextrose 50% Injectable 25 Gram(s) IV Push once  dextrose 50% Injectable 25 Gram(s) IV Push once  dextrose 50% Injectable 12.5 Gram(s) IV Push once  dextrose Oral Gel 15 Gram(s) Oral once  digoxin     Tablet 125 MICROGram(s) Oral daily  enoxaparin Injectable 100 milliGRAM(s) SubCutaneous every 12 hours  famotidine    Tablet 20 milliGRAM(s) Oral daily  ferrous    sulfate 325 milliGRAM(s) Oral two times a day  gabapentin 100 milliGRAM(s) Oral three times a day  glucagon  Injectable 1 milliGRAM(s) IntraMuscular once  insulin lispro (ADMELOG) corrective regimen sliding scale   SubCutaneous three times a day before meals  insulin lispro (ADMELOG) corrective regimen sliding scale   SubCutaneous at bedtime  melatonin 5 milliGRAM(s) Oral at bedtime  metoprolol tartrate 12.5 milliGRAM(s) Oral every 6 hours  midodrine. 15 milliGRAM(s) Oral three times a day  multivitamin 1 Tablet(s) Oral daily  mupirocin 2% Nasal 1 Application(s) Both Nostrils two times a day  naloxegol 25 milliGRAM(s) Oral daily  oxyCODONE  ER Tablet 10 milliGRAM(s) Oral every 12 hours  pantoprazole    Tablet 40 milliGRAM(s) Oral before breakfast  polyethylene glycol 3350 17 Gram(s) Oral daily  senna 2 Tablet(s) Oral at bedtime  sucralfate 1 Gram(s) Oral two times a day  tiotropium 2.5 MICROgram(s)/olodaterol 2.5 MICROgram(s) (STIOLTO) Inhaler 2 Puff(s) Inhalation daily  trimethoprim  160 mG/sulfamethoxazole 800 mG 1 Tablet(s) Oral two times a day    MEDICATIONS  (PRN):  acetaminophen     Tablet .. 650 milliGRAM(s) Oral every 6 hours PRN Temp greater or equal to 38C (100.4F)  HYDROmorphone  Injectable 1 milliGRAM(s) IV Push daily PRN Severe Pain (7 - 10)  oxyCODONE    IR 2.5 milliGRAM(s) Oral every 4 hours PRN Moderate Pain (4 - 6)  sodium chloride 0.65% Nasal 1 Spray(s) Both Nostrils daily PRN Nasal Congestion  traMADol 50 milliGRAM(s) Oral every 4 hours PRN Mild Pain (1 - 3)      Allergies    fish (Hives)  ertapenem (Blisters; Rash)    Intolerances        REVIEW OF SYSTEMS:  CONSTITUTIONAL: No fever, weight loss, or fatigue  EYES: No eye pain, visual disturbances, or discharge  ENMT:  No difficulty hearing, tinnitus, vertigo; No sinus or throat pain  NECK: No pain or stiffness  RESPIRATORY: No cough, wheezing, chills or hemoptysis; No shortness of breath  CARDIOVASCULAR: No chest pain, palpitations, dizziness, or leg swelling  GASTROINTESTINAL: No abdominal or epigastric pain. No nausea, vomiting, or hematemesis; No diarrhea or constipation. No melena or hematochezia.  GENITOURINARY: No dysuria, frequency, hematuria, or incontinence  NEUROLOGICAL: No headaches, memory loss, loss of strength, numbness, or tremors  SKIN: No itching, burning, rashes, or lesions   LYMPH NODES: No enlarged glands  ENDOCRINE: No heat or cold intolerance; No hair loss; No polydipsia or polyuria  MUSCULOSKELETAL: No joint pain or swelling; No muscle, back, or extremity pain  PSYCHIATRIC: No depression, anxiety, mood swings, or difficulty sleeping  HEME/LYMPH: No easy bruising, or bleeding gums  ALLERGY AND IMMUNOLOGIC: No hives or eczema    Vital Signs Last 24 Hrs  T(C): 37.1 (14 Sep 2024 11:15), Max: 37.1 (13 Sep 2024 21:15)  T(F): 98.8 (14 Sep 2024 11:15), Max: 98.8 (14 Sep 2024 11:15)  HR: 74 (14 Sep 2024 11:15) (74 - 99)  BP: 119/76 (14 Sep 2024 11:15) (109/68 - 119/76)  BP(mean): --  RR: 18 (14 Sep 2024 11:15) (18 - 18)  SpO2: 93% (14 Sep 2024 11:15) (93% - 99%)    Parameters below as of 14 Sep 2024 11:15  Patient On (Oxygen Delivery Method): room air        PHYSICAL EXAM:  GENERAL: NAD, well-groomed, well-developed  HEAD:  Atraumatic, Normocephalic  EYES: EOMI, PERRLA, conjunctiva and sclera clear  ENMT: No tonsillar erythema, exudates, or enlargement; Moist mucous membranes, Good dentition, No lesions  NECK: Supple, No JVD, Normal thyroid  NERVOUS SYSTEM:  Alert & Oriented X3, Good concentration; Motor Strength 5/5 B/L upper and lower extremities; DTRs 2+ intact and symmetric  CHEST/LUNG: Clear to auscultation bilaterally; No rales, rhonchi, wheezing, or rubs  HEART: Regular rate and rhythm; No murmurs, rubs, or gallops  ABDOMEN: Soft, Nontender, Nondistended; Bowel sounds present  EXTREMITIES: left TMA stump, right foot in surgical dressing  LYMPH: No lymphadenopathy noted  SKIN: right heel ulcer    LABS:                        8.6    9.13  )-----------( 398      ( 14 Sep 2024 09:50 )             28.2       Ca    7.7        13 Sep 2024 10:18        Urinalysis Basic - ( 13 Sep 2024 10:18 )    Color: x / Appearance: x / SG: x / pH: x  Gluc: 168 mg/dL / Ketone: x  / Bili: x / Urobili: x   Blood: x / Protein: x / Nitrite: x   Leuk Esterase: x / RBC: x / WBC x   Sq Epi: x / Non Sq Epi: x / Bacteria: x      CAPILLARY BLOOD GLUCOSE      POCT Blood Glucose.: 219 mg/dL (14 Sep 2024 11:56)  POCT Blood Glucose.: 165 mg/dL (14 Sep 2024 08:09)  POCT Blood Glucose.: 184 mg/dL (13 Sep 2024 21:22)  POCT Blood Glucose.: 165 mg/dL (13 Sep 2024 17:28)      RADIOLOGY & ADDITIONAL TESTS:    Notes Reviewed:  [x ] YES  [ ] NO    Care Discussed with Consultants/Other Providers [x ] YES  [ ] NO

## 2024-09-14 NOTE — PROGRESS NOTE ADULT - ASSESSMENT
Anemia most likely related to diabetic foot ulcer. r/o osteomyelitis.    has received 3 units PRBC since admission  expect patient to be transfusion dependent until infection resolves  fe studies c/w diagnosis  hgb 8.6    Recommendations:  1.  follow CBC and transfuse as indicated  2.  continue ID/podiatry management  3.  is not a candidate for JHOANA treatment  4.  hold transfusion and observe  5.  further  heme recommendations pending above

## 2024-09-14 NOTE — PHYSICAL THERAPY INITIAL EVALUATION ADULT - ADDITIONAL COMMENTS
Pt is a long term resident at [facility]. Pt has wheelchair. Pt is a long term resident at [facility]. Pt has wheelchair but reports he is ambulatory with RW. Pt reports he was independent in ADLs.

## 2024-09-14 NOTE — PROGRESS NOTE ADULT - SUBJECTIVE AND OBJECTIVE BOX
[facility] -- Brittany Lutz Pannella, Patel, Savella, Goodger, Cohen  Office # [phone]    Follow Up:  Cardiac optimization     Subjective/Observations: seen and examined, awake, alert, resting comfortably in bed, denies chest pain, dyspnea, palpitations or dizziness, orthopnea and PND. Tolerating room air. no events overnight       REVIEW OF SYSTEMS: All other review of systems is negative unless indicated above  PAST MEDICAL & SURGICAL HISTORY:  Diabetes      Diabetes mellitus with no complication      Afib      Hypertension      BPH (benign prostatic hyperplasia)      Perforated gastric ulcer  s/p emergent ex-lap omentopexy and plication 6/2019      Pulmonary embolism      History of non-ST elevation myocardial infarction (NSTEMI)      Osteomyelitis  s/p debridement      CAD S/P percutaneous coronary angioplasty      Cerebrovascular accident      H/O abdominal surgery      Perforated gastric ulcer      Traumatic amputation of left foot, initial encounter        MEDICATIONS  (STANDING):  aMIOdarone    Tablet   Oral   aMIOdarone    Tablet 200 milliGRAM(s) Oral daily  ascorbic acid 500 milliGRAM(s) Oral daily  aspirin enteric coated 81 milliGRAM(s) Oral daily  atorvastatin 40 milliGRAM(s) Oral at bedtime  cetirizine 10 milliGRAM(s) Oral daily  cholecalciferol 1000 Unit(s) Oral daily  dextrose 5%. 1000 milliLiter(s) (100 mL/Hr) IV Continuous <Continuous>  dextrose 5%. 1000 milliLiter(s) (50 mL/Hr) IV Continuous <Continuous>  dextrose 50% Injectable 25 Gram(s) IV Push once  dextrose 50% Injectable 25 Gram(s) IV Push once  dextrose 50% Injectable 12.5 Gram(s) IV Push once  dextrose Oral Gel 15 Gram(s) Oral once  digoxin     Tablet 125 MICROGram(s) Oral daily  enoxaparin Injectable 100 milliGRAM(s) SubCutaneous every 12 hours  famotidine    Tablet 20 milliGRAM(s) Oral daily  ferrous    sulfate 325 milliGRAM(s) Oral two times a day  gabapentin 100 milliGRAM(s) Oral three times a day  glucagon  Injectable 1 milliGRAM(s) IntraMuscular once  insulin lispro (ADMELOG) corrective regimen sliding scale   SubCutaneous at bedtime  insulin lispro (ADMELOG) corrective regimen sliding scale   SubCutaneous three times a day before meals  melatonin 5 milliGRAM(s) Oral at bedtime  metoprolol tartrate 12.5 milliGRAM(s) Oral every 6 hours  midodrine. 15 milliGRAM(s) Oral three times a day  multivitamin 1 Tablet(s) Oral daily  mupirocin 2% Nasal 1 Application(s) Both Nostrils two times a day  naloxegol 25 milliGRAM(s) Oral daily  oxyCODONE  ER Tablet 10 milliGRAM(s) Oral every 12 hours  pantoprazole    Tablet 40 milliGRAM(s) Oral before breakfast  polyethylene glycol 3350 17 Gram(s) Oral daily  senna 2 Tablet(s) Oral at bedtime  sucralfate 1 Gram(s) Oral two times a day  tiotropium 2.5 MICROgram(s)/olodaterol 2.5 MICROgram(s) (STIOLTO) Inhaler 2 Puff(s) Inhalation daily  trimethoprim  160 mG/sulfamethoxazole 800 mG 1 Tablet(s) Oral two times a day    MEDICATIONS  (PRN):  acetaminophen     Tablet .. 650 milliGRAM(s) Oral every 6 hours PRN Temp greater or equal to 38C (100.4F)  HYDROmorphone  Injectable 1 milliGRAM(s) IV Push daily PRN Severe Pain (7 - 10)  oxyCODONE    IR 2.5 milliGRAM(s) Oral every 4 hours PRN Moderate Pain (4 - 6)  sodium chloride 0.65% Nasal 1 Spray(s) Both Nostrils daily PRN Nasal Congestion  traMADol 50 milliGRAM(s) Oral every 4 hours PRN Mild Pain (1 - 3)    Allergies    fish (Hives)  ertapenem (Blisters; Rash)    Intolerances      Vital Signs Last 24 Hrs  T(C): 37.1 (14 Sep 2024 11:15), Max: 37.1 (13 Sep 2024 21:15)  T(F): 98.8 (14 Sep 2024 11:15), Max: 98.8 (14 Sep 2024 11:15)  HR: 74 (14 Sep 2024 11:15) (74 - 99)  BP: 119/76 (14 Sep 2024 11:15) (109/68 - 119/76)  BP(mean): --  RR: 18 (14 Sep 2024 11:15) (18 - 18)  SpO2: 93% (14 Sep 2024 11:15) (93% - 99%)    Parameters below as of 14 Sep 2024 11:15  Patient On (Oxygen Delivery Method): room air      I&O's Summary    13 Sep 2024 07:01  -  14 Sep 2024 07:00  --------------------------------------------------------  IN: 0 mL / OUT: 600 mL / NET: -600 mL          TELE: Not on telemetry   PHYSICAL EXAM:  Constitutional: NAD, awake and alert  HEENT: Moist Mucous Membranes, Anicteric  Pulmonary: Non-labored, breath sounds are clear bilaterally, No wheezing, rales or rhonchi  Cardiovascular: IRRR, S1 and S2, No murmurs, rubs, gallops or clicks  Gastrointestinal: Bowel Sounds present, soft, nontender.   Lymph: No peripheral edema. No lymphadenopathy.  Skin: No visible rashes or ulcers.  Psych:  Mood & affect appropriate  LABS: All Labs Reviewed:                 8.6    9.13  )-----------( 398      ( 14 Sep 2024 09:50 )             28.2                         8.7    x     )-----------( x        ( 13 Sep 2024 16:05 )             27.6                         8.7    11.32 )-----------( 404      ( 13 Sep 2024 10:18 )             26.7     13 Sep 2024 10:18    139    |  109    |  9      ----------------------------<  168    4.0     |  23     |  0.86   12 Sep 2024 06:10    142    |  111    |  14     ----------------------------<  114    4.1     |  25     |  1.10     Ca    7.7        13 Sep 2024 10:18  Ca    7.7        12 Sep 2024 06:10  Mg     1.8       12 Sep 2024 06:10    TPro  4.7    /  Alb  1.4    /  TBili  0.2    /  DBili  x      /  AST  26     /  ALT  24     /  AlkPhos  95     13 Sep 2024 10:18              TRANSTHORACIC ECHOCARDIOGRAM REPORT  ________________________________________________________________________________                                      _______       Pt. Name:       SARAH MORRISON Study Date:    9/5/2024  MRN:            [id number]         YOB: 1970  Accession #:    [id number]        Age:           54 years  Account#:       [id number]       Gender:        M  Heart Rate:                      Height:        74.02 in (188.00 cm)  Rhythm:                          Weight:        207.23 lb (94.00 kg)  Blood Pressure: 97/60 mmHg       BSA/BMI:       2.21 m² / 26.60 kg/m²  ________________________________________________________________________________________  Referring Physician:    [id number] Hill Brizuela  Interpreting Physician: Claudette Jacobs  Primary Sonographer:    Mounika FELDMAN    CPT:               ECHO TTE WO CON COMP W DOPP - 47562.m  Indication(s):     Abnormal electrocardiogram ECG/EKG - R94.31  Procedure:         Transthoracic echocardiogram with2-D, M-mode and complete                     spectral and color flow Doppler.  Ordering Location: [facility]  Admission Status:  Inpatient  Study Information: Image quality for this study is technically difficult.    _______________________________________________________________________________________     CONCLUSIONS:      1. Technically difficult image quality.   2. Left ventricular cavity is normal in size. Left ventricular wall thickness is normal. Left ventricular systolic function is normal withan ejection fraction of 69 % by Castañeda's method of disks. There are no regional wall motion abnormalities seen.   3. There is increased LV mass and concentric hypertrophy.   4. Normal right ventricular cavity size and normal right ventricular systolic function.   5. Left atrium is severely dilated.   6. The right atrium is severely dilated.   7. There is calcification of the mitral valve annulus.   8. Mitral valve leaflets are diffusely calcified.   9. Moderate to severe mitral regurgitation.  10. Trileaflet aortic valve with normal systolic excursion. There is calcification of the aortic valve leaflets.  11. Mild tricuspid regurgitation.  12. Estimated pulmonary artery systolic pressure is 39 mmHg, consistent with borderline pulmonary hypertension.  13. The inferior vena cava is normal in size measuring 1.75 cm in diameter, (normal <2.1cm) with normal inspiratory collapse (normal >50%) consistent with normal right atrial pressure (~3, range 0-5mmHg).    ________________________________________________________________________________________  FINDINGS:     Left Ventricle:  The left ventricular cavity is normal in size. Left ventricular wall thickness is normal. Left ventricular systolic function is normal with a calculated ejection fraction of 69 % by the Castañeda's biplane method of disks. There are no regional wall motion abnormalities seen. There is increased LV mass and concentric hypertrophy. The left ventricular diastolic function is indeterminate.     Right Ventricle:  The right ventricular cavity is normal in size and right ventricular systolic function is normal.     Left Atrium:  The left atrium is severely dilated with an indexed volume of 31.95 ml/m².     Right Atrium:  The right atrium is severely dilated with an indexed volume of 26.74 ml/m².     Aortic Valve:  The aortic valve appears trileaflet with normal systolic excursion. There is calcification of the aortic valve leaflets.     Mitral Valve:  There is calcification of the mitral valve annulus. Mitral valve leaflets are diffusely calcified. There is moderate to severe mitral regurgitation.     Tricuspid Valve:  Structurally normal tricuspid valve with normal leaflet excursion. There is mild tricuspid regurgitation. Estimated pulmonary artery systolicpressure is 39 mmHg, consistent with borderline pulmonary hypertension.     Pulmonic Valve:  The pulmonic valve was not well visualized. There is trace pulmonic regurgitation.     Systemic Veins:  The inferior vena cava is normal in size measuring 1.75 cm in diameter, (normal <2.1cm) with normal inspiratory collapse (normal >50%) consistent with normal right atrial pressure (~3, range 0-5mmHg).  ____________________________________________________________________  QUANTITATIVE DATA:  Left Ventricle Measurements: (Indexed to BSA)     IVSd (2D):   1.4 cm  LVPWd (2D):  1.3 cm  LVIDd (2D):  5.4 cm  LVIDs (2D):  4.0 cm  LV Mass:     306 g  138.7 g/m²  LV Vol d, MOD A2C: 87.4 ml 39.62 ml/m²  LV Vol d, MOD A4C: 81.2 ml 36.78 ml/m²  LV Vol d, MOD BP: 92.3 ml 41.82 ml/m²  LV Vol s, MOD A2C: 29.0 ml 13.13 ml/m²  LV Vol s, MOD A4C: 28.3 ml 12.82 ml/m²  LV Vol s, MOD BP:  28.9 ml 13.08 ml/m²  LVOT SV MOD BP:    63.4 ml  LV EF% MOD BP:     69 %     MV E Vmax:    1.19 m/s  e' lateral:   13.10 cm/s  e'medial:    10.70 cm/s  E/e' lateral: 9.05  E/e' medial:  12.00  E/e' Average: 9.96  MV DT:        148 msec    Aorta Measurements: (Normal range) (Indexed to BSA)     Ao Root d 3.23 cm (3.1 - 3.7 cm) 1.46 cm/m²            Left Atrium Measurements: (Indexed to BSA)  LA Diam 2D: 4.72 cm         Right Atrial Measurements:     RA Vol:       59.00 ml  RA Vol Index: 26.74 ml/m²       LVOT / RVOT/ Qp/Qs Data: (Indexed to BSA)  LVOT Diameter: 2.29 cm  LVOT Area:     4.12 cm²    Mitral Valve Measurements:     MV E Vmax: 1.2 m/s       Tricuspid Valve Measurements:     TR Vmax:          3.0 m/s  TR Peak Gradient: 35.9 mmHg  RA Pressure:      3 mmHg  PASP:             39 mmHg    ________________________________________________________________________________________  Electronically signed on 9/6/2024 at 10:38:59 AM by Claudette Jacobs         *** Final ***

## 2024-09-14 NOTE — PROGRESS NOTE ADULT - SUBJECTIVE AND OBJECTIVE BOX
CAPILLARY BLOOD GLUCOSE      POCT Blood Glucose.: 184 mg/dL (13 Sep 2024 21:22)  POCT Blood Glucose.: 165 mg/dL (13 Sep 2024 17:28)  POCT Blood Glucose.: 163 mg/dL (13 Sep 2024 12:08)  POCT Blood Glucose.: 140 mg/dL (13 Sep 2024 08:16)      Vital Signs Last 24 Hrs  T(C): 36.5 (14 Sep 2024 05:09), Max: 37.1 (13 Sep 2024 21:15)  T(F): 97.7 (14 Sep 2024 05:09), Max: 98.7 (13 Sep 2024 21:15)  HR: 81 (14 Sep 2024 05:09) (79 - 99)  BP: 111/79 (14 Sep 2024 05:09) (99/65 - 111/79)  BP(mean): --  RR: 18 (14 Sep 2024 05:09) (18 - 18)  SpO2: 94% (14 Sep 2024 05:09) (94% - 99%)    Parameters below as of 14 Sep 2024 05:09  Patient On (Oxygen Delivery Method): room air        General: WN/WD NAD  Respiratory: CTA B/L  CV: RRR, S1S2, no murmurs, rubs or gallops  Abdominal: Soft, NT, ND +BS, Last BM  Extremities: No edema, + peripheral pulses     09-13    139  |  109<H>  |  9   ----------------------------<  168<H>  4.0   |  23  |  0.86    Ca    7.7<L>      13 Sep 2024 10:18    TPro  4.7<L>  /  Alb  1.4<L>  /  TBili  0.2  /  DBili  x   /  AST  26  /  ALT  24  /  AlkPhos  95  09-13      atorvastatin 40 milliGRAM(s) Oral at bedtime  dextrose 50% Injectable 25 Gram(s) IV Push once  dextrose 50% Injectable 12.5 Gram(s) IV Push once  dextrose 50% Injectable 25 Gram(s) IV Push once  dextrose Oral Gel 15 Gram(s) Oral once  glucagon  Injectable 1 milliGRAM(s) IntraMuscular once  insulin lispro (ADMELOG) corrective regimen sliding scale   SubCutaneous at bedtime  insulin lispro (ADMELOG) corrective regimen sliding scale   SubCutaneous three times a day before meals

## 2024-09-14 NOTE — PROGRESS NOTE ADULT - PROBLEM SELECTOR PLAN 1
S/P IV ABX - zosyn per ID  ID eval with dr. lan appreciated  ? OM  ? MR  S/p debridement and partial calcanectomy per podiatry   may need BKA  vascular eval  now for angio per vascular

## 2024-09-14 NOTE — PHYSICAL THERAPY INITIAL EVALUATION ADULT - GENERAL OBSERVATIONS, REHAB EVAL
Patient was received semi-supine in bed in NAD. Patient was received semi-supine in bed in NAD, +ace wrap to R foot, +Aguilera.

## 2024-09-14 NOTE — PHYSICAL THERAPY INITIAL EVALUATION ADULT - TRANSFER TRAINING, PT EVAL
Patient will perform sit<->stand/slide board transfer with minAx1 by 2 weeks to allow patient to get on/off toilet safely.

## 2024-09-14 NOTE — PROGRESS NOTE ADULT - SUBJECTIVE AND OBJECTIVE BOX
VASCULAR SURGERY PA NOTE ON BEHALF OF DR. NEVILLE:    S: Patient seen and examined at bedside.   no acute events overnight.  Patient has no new complaints this AM.  Denies fevers, chills, chest pain, SOB, palpitations, calf pain.      MEDICATIONS:  acetaminophen     Tablet .. 650 milliGRAM(s) Oral every 6 hours PRN  aMIOdarone    Tablet 200 milliGRAM(s) Oral daily  aMIOdarone    Tablet   Oral   ascorbic acid 500 milliGRAM(s) Oral daily  aspirin enteric coated 81 milliGRAM(s) Oral daily  atorvastatin 40 milliGRAM(s) Oral at bedtime  cetirizine 10 milliGRAM(s) Oral daily  cholecalciferol 1000 Unit(s) Oral daily  dextrose 5%. 1000 milliLiter(s) IV Continuous <Continuous>  dextrose 5%. 1000 milliLiter(s) IV Continuous <Continuous>  dextrose 50% Injectable 25 Gram(s) IV Push once  dextrose 50% Injectable 12.5 Gram(s) IV Push once  dextrose 50% Injectable 25 Gram(s) IV Push once  dextrose Oral Gel 15 Gram(s) Oral once  digoxin     Tablet 125 MICROGram(s) Oral daily  enoxaparin Injectable 100 milliGRAM(s) SubCutaneous every 12 hours  famotidine    Tablet 20 milliGRAM(s) Oral daily  ferrous    sulfate 325 milliGRAM(s) Oral two times a day  gabapentin 100 milliGRAM(s) Oral three times a day  glucagon  Injectable 1 milliGRAM(s) IntraMuscular once  HYDROmorphone  Injectable 1 milliGRAM(s) IV Push daily PRN  insulin lispro (ADMELOG) corrective regimen sliding scale   SubCutaneous at bedtime  insulin lispro (ADMELOG) corrective regimen sliding scale   SubCutaneous three times a day before meals  melatonin 5 milliGRAM(s) Oral at bedtime  metoprolol tartrate 12.5 milliGRAM(s) Oral every 6 hours  midodrine. 15 milliGRAM(s) Oral three times a day  multivitamin 1 Tablet(s) Oral daily  mupirocin 2% Nasal 1 Application(s) Both Nostrils two times a day  naloxegol 25 milliGRAM(s) Oral daily  oxyCODONE    IR 2.5 milliGRAM(s) Oral every 4 hours PRN  oxyCODONE  ER Tablet 10 milliGRAM(s) Oral every 12 hours  pantoprazole    Tablet 40 milliGRAM(s) Oral before breakfast  polyethylene glycol 3350 17 Gram(s) Oral daily  senna 2 Tablet(s) Oral at bedtime  sodium chloride 0.65% Nasal 1 Spray(s) Both Nostrils daily PRN  sucralfate 1 Gram(s) Oral two times a day  tiotropium 2.5 MICROgram(s)/olodaterol 2.5 MICROgram(s) (STIOLTO) Inhaler 2 Puff(s) Inhalation daily  traMADol 50 milliGRAM(s) Oral every 4 hours PRN  trimethoprim  160 mG/sulfamethoxazole 800 mG 1 Tablet(s) Oral two times a day      O:  Vital Signs Last 24 Hrs  T(C): 37.1 (14 Sep 2024 11:15), Max: 37.1 (13 Sep 2024 21:15)  T(F): 98.8 (14 Sep 2024 11:15), Max: 98.8 (14 Sep 2024 11:15)  HR: 74 (14 Sep 2024 11:15) (74 - 99)  BP: 119/76 (14 Sep 2024 11:15) (109/68 - 119/76)  BP(mean): --  RR: 18 (14 Sep 2024 11:15) (18 - 18)  SpO2: 93% (14 Sep 2024 11:15) (93% - 99%)    Parameters below as of 14 Sep 2024 11:15  Patient On (Oxygen Delivery Method): room air        I&O SUMMARY:    09-13-24 @ 07:01  -  09-14-24 @ 07:00  --------------------------------------------------------  IN: 0 mL / OUT: 600 mL / NET: -600 mL    09-14-24 @ 07:01  -  09-14-24 @ 17:10  --------------------------------------------------------  IN: 0 mL / OUT: 2200 mL / NET: -2200 mL        PHYSICAL EXAM:  Lungs: CTA bilat without W/R/R  Card: S1S2  Abd: Soft, NT, ND.  +BS x 4.  No rebound/guarding.    Ext: Calves soft, NT, without edema bilat    LABS:                        8.6    9.13  )-----------( 398      ( 14 Sep 2024 09:50 )             28.2     09-13    139  |  109<H>  |  9   ----------------------------<  168<H>  4.0   |  23  |  0.86    Ca    7.7<L>      13 Sep 2024 10:18    TPro  4.7<L>  /  Alb  1.4<L>  /  TBili  0.2  /  DBili  x   /  AST  26  /  ALT  24  /  AlkPhos  95  09-13              RADIOLOGY:    Assessment:  53 y/o male with h/o AFib, indwelling Aguilera, CAD s/p stents, CVA, DMT 2, Hypertension, osteomyelitis s/p partial Right calcanectomy & soft tissue debridement by Podiatry with Arterial duplex showing Diffuse abnormal monophasic flow throughout the right leg and the posterior tibial and peroneal arteries are not seen.       Plan:  - F/u CTA w/ runoff  - Further vascular surgical recommendations pending imaging  - discussed w/ Dr. Neville

## 2024-09-14 NOTE — PROGRESS NOTE ADULT - ASSESSMENT
53 YO M with past medical history of AFib (on Eliquis), s/p cardiac arrest x 2 w/ AHRF (on recent admission), indwelling Aguilera, cerebral aneurysm, CAD (s/p stents), CVA, T2DM, HTN, OM (s/p debridement), PE, perforated gastric ulcer s/p ometnopexy 2019 with Dr. Mejia who presents with possible gangrenous right foot. In ED pt found to be in afib with RVR and hypotension.    Afib with RVR  - Likely, in the setting of septic shock from gangrenous foot.   - S/p Cardizem gtt and Dig load.  Amiodarone PO load (5Gm)  - BP, HR stable and controlled per flow sheet   - Continue  amio 200 mg PO qd  - Continue digoxin 125 pO QD.  Dig level = 1.1 (9/9)  - Continue metoprolol 12.5 QID. Plan to switch to long-acting  - Pt on Eliquis at home; held for Podia procedure  - Would continue to hold as Hgb trending down, resume when able   - Would repeat and transfuse as needed to keep Hgb ~8  - Monitor and replete Lytes. Keep K > 4 and Mg > 2    - TTE showed normal LVF with mod-severe MR  - No evidence of volume overload    - BP mostly stable and controlled   - Continue Midodrine 15mg TID for bp support    - With known Hx of CAD.  EKG nonischemic  - No evidence of any active ischemia   - Continue ASA and statin.      - S/P partial calcanectomy of right foot, 9/10, pending path, Podia following   - Abx per ID    - Will continue to follow.    HANNAH GrahamBESSIE  Nurse Practitioner - Cardiology   call TEAMS

## 2024-09-15 LAB
ALBUMIN SERPL ELPH-MCNC: 1.5 G/DL — LOW (ref 3.3–5)
ALP SERPL-CCNC: 92 U/L — SIGNIFICANT CHANGE UP (ref 40–120)
ALT FLD-CCNC: 22 U/L — SIGNIFICANT CHANGE UP (ref 12–78)
ANION GAP SERPL CALC-SCNC: 8 MMOL/L — SIGNIFICANT CHANGE UP (ref 5–17)
AST SERPL-CCNC: 21 U/L — SIGNIFICANT CHANGE UP (ref 15–37)
BILIRUB SERPL-MCNC: 0.3 MG/DL — SIGNIFICANT CHANGE UP (ref 0.2–1.2)
BUN SERPL-MCNC: 10 MG/DL — SIGNIFICANT CHANGE UP (ref 7–23)
CALCIUM SERPL-MCNC: 8 MG/DL — LOW (ref 8.5–10.1)
CHLORIDE SERPL-SCNC: 108 MMOL/L — SIGNIFICANT CHANGE UP (ref 96–108)
CO2 SERPL-SCNC: 21 MMOL/L — LOW (ref 22–31)
CREAT SERPL-MCNC: 0.77 MG/DL — SIGNIFICANT CHANGE UP (ref 0.5–1.3)
EGFR: 105 ML/MIN/1.73M2 — SIGNIFICANT CHANGE UP
GLUCOSE BLDC GLUCOMTR-MCNC: 105 MG/DL — HIGH (ref 70–99)
GLUCOSE BLDC GLUCOMTR-MCNC: 157 MG/DL — HIGH (ref 70–99)
GLUCOSE BLDC GLUCOMTR-MCNC: 176 MG/DL — HIGH (ref 70–99)
GLUCOSE BLDC GLUCOMTR-MCNC: 85 MG/DL — SIGNIFICANT CHANGE UP (ref 70–99)
GLUCOSE SERPL-MCNC: 146 MG/DL — HIGH (ref 70–99)
HCT VFR BLD CALC: 28.7 % — LOW (ref 39–50)
HGB BLD-MCNC: 8.7 G/DL — LOW (ref 13–17)
MCHC RBC-ENTMCNC: 29.6 PG — SIGNIFICANT CHANGE UP (ref 27–34)
MCHC RBC-ENTMCNC: 30.3 GM/DL — LOW (ref 32–36)
MCV RBC AUTO: 97.6 FL — SIGNIFICANT CHANGE UP (ref 80–100)
NRBC # BLD: 0 /100 WBCS — SIGNIFICANT CHANGE UP (ref 0–0)
PLATELET # BLD AUTO: 304 K/UL — SIGNIFICANT CHANGE UP (ref 150–400)
POTASSIUM SERPL-MCNC: 4.2 MMOL/L — SIGNIFICANT CHANGE UP (ref 3.5–5.3)
POTASSIUM SERPL-SCNC: 4.2 MMOL/L — SIGNIFICANT CHANGE UP (ref 3.5–5.3)
PROT SERPL-MCNC: 4.9 G/DL — LOW (ref 6–8.3)
RBC # BLD: 2.94 M/UL — LOW (ref 4.2–5.8)
RBC # FLD: 19.6 % — HIGH (ref 10.3–14.5)
SODIUM SERPL-SCNC: 137 MMOL/L — SIGNIFICANT CHANGE UP (ref 135–145)
WBC # BLD: 9.33 K/UL — SIGNIFICANT CHANGE UP (ref 3.8–10.5)
WBC # FLD AUTO: 9.33 K/UL — SIGNIFICANT CHANGE UP (ref 3.8–10.5)

## 2024-09-15 PROCEDURE — 99232 SBSQ HOSP IP/OBS MODERATE 35: CPT

## 2024-09-15 PROCEDURE — 99024 POSTOP FOLLOW-UP VISIT: CPT

## 2024-09-15 RX ADMIN — Medication 2 TABLET(S): at 22:22

## 2024-09-15 RX ADMIN — ENOXAPARIN SODIUM 100 MILLIGRAM(S): 100 INJECTION SUBCUTANEOUS at 05:20

## 2024-09-15 RX ADMIN — Medication 40 MILLIGRAM(S): at 05:21

## 2024-09-15 RX ADMIN — Medication 5 MILLIGRAM(S): at 22:22

## 2024-09-15 RX ADMIN — OXYCODONE HYDROCHLORIDE 2.5 MILLIGRAM(S): 5 TABLET ORAL at 00:00

## 2024-09-15 RX ADMIN — Medication 1000 UNIT(S): at 12:52

## 2024-09-15 RX ADMIN — Medication 81 MILLIGRAM(S): at 12:52

## 2024-09-15 RX ADMIN — MIDODRINE HYDROCHLORIDE 15 MILLIGRAM(S): 5 TABLET ORAL at 05:20

## 2024-09-15 RX ADMIN — Medication 100 MILLIGRAM(S): at 22:21

## 2024-09-15 RX ADMIN — Medication 10 MILLIGRAM(S): at 12:52

## 2024-09-15 RX ADMIN — MIDODRINE HYDROCHLORIDE 15 MILLIGRAM(S): 5 TABLET ORAL at 12:52

## 2024-09-15 RX ADMIN — Medication 325 MILLIGRAM(S): at 05:21

## 2024-09-15 RX ADMIN — Medication 325 MILLIGRAM(S): at 18:47

## 2024-09-15 RX ADMIN — Medication 100 MILLIGRAM(S): at 05:19

## 2024-09-15 RX ADMIN — SULFAMETHOXAZOLE AND TRIMETHOPRIM 1 TABLET(S): 800; 160 TABLET ORAL at 18:46

## 2024-09-15 RX ADMIN — METOPROLOL TARTRATE 12.5 MILLIGRAM(S): 100 TABLET ORAL at 18:46

## 2024-09-15 RX ADMIN — Medication 1 TABLET(S): at 12:52

## 2024-09-15 RX ADMIN — Medication 1 APPLICATION(S): at 05:21

## 2024-09-15 RX ADMIN — ENOXAPARIN SODIUM 100 MILLIGRAM(S): 100 INJECTION SUBCUTANEOUS at 18:46

## 2024-09-15 RX ADMIN — OXYCODONE HYDROCHLORIDE 10 MILLIGRAM(S): 5 TABLET ORAL at 06:00

## 2024-09-15 RX ADMIN — DIGOXIN 125 MICROGRAM(S): 0.12 TABLET ORAL at 05:21

## 2024-09-15 RX ADMIN — METOPROLOL TARTRATE 12.5 MILLIGRAM(S): 100 TABLET ORAL at 12:51

## 2024-09-15 RX ADMIN — OXYCODONE HYDROCHLORIDE 200 MILLIGRAM(S): 15 TABLET ORAL at 05:21

## 2024-09-15 RX ADMIN — SUCRALFATE 1 GRAM(S): 1 SUSPENSION ORAL at 18:47

## 2024-09-15 RX ADMIN — OXYCODONE HYDROCHLORIDE 10 MILLIGRAM(S): 5 TABLET ORAL at 05:20

## 2024-09-15 RX ADMIN — OXYCODONE HYDROCHLORIDE 10 MILLIGRAM(S): 5 TABLET ORAL at 18:47

## 2024-09-15 RX ADMIN — Medication 100 MILLIGRAM(S): at 12:52

## 2024-09-15 RX ADMIN — Medication 500 MILLIGRAM(S): at 12:53

## 2024-09-15 RX ADMIN — FAMOTIDINE 20 MILLIGRAM(S): 10 INJECTION INTRAVENOUS at 12:53

## 2024-09-15 RX ADMIN — NALOXEGOL OXALATE 25 MILLIGRAM(S): 25 TABLET, FILM COATED ORAL at 12:55

## 2024-09-15 RX ADMIN — SULFAMETHOXAZOLE AND TRIMETHOPRIM 1 TABLET(S): 800; 160 TABLET ORAL at 05:20

## 2024-09-15 RX ADMIN — METOPROLOL TARTRATE 12.5 MILLIGRAM(S): 100 TABLET ORAL at 05:20

## 2024-09-15 RX ADMIN — Medication 1: at 08:18

## 2024-09-15 RX ADMIN — Medication 40 MILLIGRAM(S): at 22:22

## 2024-09-15 RX ADMIN — SUCRALFATE 1 GRAM(S): 1 SUSPENSION ORAL at 05:21

## 2024-09-15 RX ADMIN — MIDODRINE HYDROCHLORIDE 15 MILLIGRAM(S): 5 TABLET ORAL at 18:47

## 2024-09-15 NOTE — PROGRESS NOTE ADULT - SUBJECTIVE AND OBJECTIVE BOX
PROGRESS NOTE   Patient is a 54y old  Male who presents with a chief complaint of AF (15 Sep 2024 15:09)      HPI:  55yo male bib ems from wound care with hypotension and poss gangrenous foot, being treated after having amputations, pt denies fever, chills, has no pain  In ER patient was found to be in hypotension and rapid AF.  patient is being admitted for further work up and treatment.   (05 Sep 2024 17:06)      Vital Signs Last 24 Hrs  T(C): 36.1 (15 Sep 2024 11:27), Max: 36.9 (14 Sep 2024 19:45)  T(F): 97 (15 Sep 2024 11:27), Max: 98.5 (14 Sep 2024 19:45)  HR: 94 (15 Sep 2024 11:27) (69 - 99)  BP: 115/71 (15 Sep 2024 11:27) (102/71 - 117/77)  BP(mean): --  RR: 18 (15 Sep 2024 11:27) (18 - 18)  SpO2: 93% (15 Sep 2024 11:27) (93% - 97%)    Parameters below as of 15 Sep 2024 11:27  Patient On (Oxygen Delivery Method): room air                              8.7    9.33  )-----------( 304      ( 15 Sep 2024 11:59 )             28.7               09-15    137  |  108  |  10  ----------------------------<  146<H>  4.2   |  21<L>  |  0.77    Ca    8.0<L>      15 Sep 2024 11:59    TPro  4.9<L>  /  Alb  1.5<L>  /  TBili  0.3  /  DBili  x   /  AST  21  /  ALT  22  /  AlkPhos  92  09-15      PHYSICAL EXAM  Vasc: DP PT pulses non-palpable b/l.  Neuro: Diminished protective sensation b/l  MSK: Healed TMA noted to left foot. s/p right partial calcanectomy with wound debridement   Dermatological: Surgical site of right foot noted to have mild winston-incision erythema, no proximal streaking, no fluctuance, no malodor, no sanguinous oozing at this time     Surgical pathology report:  Final Diagnosis  Right foot calcaneus bone:  -   Portion of bone with acute and chronic osteomyelitis.

## 2024-09-15 NOTE — SOCIAL WORK PROGRESS NOTE - NSSWPROGRESSNOTE_GEN_ALL_CORE
SW received consult indicating that pt will not be dc this weekend. Pt remains acute. SW to continue to follow and assist in dc to [facility] when medically cleared.

## 2024-09-15 NOTE — PROGRESS NOTE ADULT - ASSESSMENT
53 YO M with past medical history of AFib (on Eliquis), s/p cardiac arrest x 2 w/ AHRF (on recent admission), indwelling Aguilera, cerebral aneurysm, CAD (s/p stents), CVA, T2DM, HTN, OM (s/p debridement), PE, perforated gastric ulcer s/p ometnopexy 2019 with Dr. Mejia who presents with possible gangrenous right foot. In ED pt found to be in afib with RVR and hypotension.    Afib with RVR  - Likely, in the setting of septic shock from gangrenous foot.   - S/p Cardizem gtt and Dig load.  Amiodarone PO load (5Gm)  - BP, HR stable and controlled per flow sheet   - Continue  amio 200 mg PO qd  - Continue digoxin 125 pO QD.  Dig level = 1.1 (9/9)  - Continue metoprolol 12.5 QID. Plan to switch to long-acting  - Eliquis on hold, now on Lovenox, plan to switch to hep for possible angio either Monday or Tuesday per Vascular notes  - H/H stable, continue to trend and transfuse as needed to keep Hgb ~8  - Monitor and replete Lytes. Keep K > 4 and Mg > 2    - TTE: normal LVF with mod-severe MR  - No evidence of volume overload    - BP mostly stable and controlled   - Continue Midodrine 15mg TID for bp support    - With known Hx of CAD.  EKG nonischemic  - No evidence of any active ischemia   - Continue ASA and statin.      - S/P partial calcanectomy of right foot, 9/10, pending path, Podia following   - Abx per ID  - ? plan for angiogram with vascular,  timing TBD    - Will continue to follow.    Jennifer Gudino [facility]  Nurse Practitioner - Cardiology   call TEAMS

## 2024-09-15 NOTE — PROGRESS NOTE ADULT - SUBJECTIVE AND OBJECTIVE BOX
Interval History:  continues on abx  Chart reviewed and events noted;   Overnight events:    MEDICATIONS  (STANDING):  aMIOdarone    Tablet   Oral   aMIOdarone    Tablet 200 milliGRAM(s) Oral daily  ascorbic acid 500 milliGRAM(s) Oral daily  aspirin enteric coated 81 milliGRAM(s) Oral daily  atorvastatin 40 milliGRAM(s) Oral at bedtime  cetirizine 10 milliGRAM(s) Oral daily  cholecalciferol 1000 Unit(s) Oral daily  dextrose 5%. 1000 milliLiter(s) (100 mL/Hr) IV Continuous <Continuous>  dextrose 5%. 1000 milliLiter(s) (50 mL/Hr) IV Continuous <Continuous>  dextrose 50% Injectable 12.5 Gram(s) IV Push once  dextrose 50% Injectable 25 Gram(s) IV Push once  dextrose 50% Injectable 25 Gram(s) IV Push once  dextrose Oral Gel 15 Gram(s) Oral once  digoxin     Tablet 125 MICROGram(s) Oral daily  enoxaparin Injectable 100 milliGRAM(s) SubCutaneous every 12 hours  famotidine    Tablet 20 milliGRAM(s) Oral daily  ferrous    sulfate 325 milliGRAM(s) Oral two times a day  gabapentin 100 milliGRAM(s) Oral three times a day  glucagon  Injectable 1 milliGRAM(s) IntraMuscular once  insulin lispro (ADMELOG) corrective regimen sliding scale   SubCutaneous at bedtime  insulin lispro (ADMELOG) corrective regimen sliding scale   SubCutaneous three times a day before meals  melatonin 5 milliGRAM(s) Oral at bedtime  metoprolol tartrate 12.5 milliGRAM(s) Oral every 6 hours  midodrine. 15 milliGRAM(s) Oral three times a day  multivitamin 1 Tablet(s) Oral daily  mupirocin 2% Nasal 1 Application(s) Both Nostrils two times a day  naloxegol 25 milliGRAM(s) Oral daily  oxyCODONE  ER Tablet 10 milliGRAM(s) Oral every 12 hours  pantoprazole    Tablet 40 milliGRAM(s) Oral before breakfast  polyethylene glycol 3350 17 Gram(s) Oral daily  senna 2 Tablet(s) Oral at bedtime  sucralfate 1 Gram(s) Oral two times a day  tiotropium 2.5 MICROgram(s)/olodaterol 2.5 MICROgram(s) (STIOLTO) Inhaler 2 Puff(s) Inhalation daily  trimethoprim  160 mG/sulfamethoxazole 800 mG 1 Tablet(s) Oral two times a day    MEDICATIONS  (PRN):  acetaminophen     Tablet .. 650 milliGRAM(s) Oral every 6 hours PRN Temp greater or equal to 38C (100.4F)  HYDROmorphone  Injectable 1 milliGRAM(s) IV Push daily PRN Severe Pain (7 - 10)  oxyCODONE    IR 2.5 milliGRAM(s) Oral every 4 hours PRN Moderate Pain (4 - 6)  sodium chloride 0.65% Nasal 1 Spray(s) Both Nostrils daily PRN Nasal Congestion  traMADol 50 milliGRAM(s) Oral every 4 hours PRN Mild Pain (1 - 3)      Vital Signs Last 24 Hrs  T(C): 36.5 (15 Sep 2024 19:16), Max: 36.9 (15 Sep 2024 04:53)  T(F): 97.7 (15 Sep 2024 19:16), Max: 98.4 (15 Sep 2024 04:53)  HR: 66 (15 Sep 2024 19:16) (66 - 99)  BP: 124/72 (15 Sep 2024 19:16) (103/67 - 124/72)  BP(mean): --  RR: 18 (15 Sep 2024 19:16) (18 - 18)  SpO2: 96% (15 Sep 2024 19:16) (93% - 96%)    Parameters below as of 15 Sep 2024 19:16  Patient On (Oxygen Delivery Method): room air        PHYSICAL EXAM  General: adult in NAD  HEENT: clear oropharynx, anicteric sclera, pink conjunctivae  Neck: supple  CV: normal S1S2 with no murmur rubs or gallops  Lungs: clear to auscultation, no wheezes, no rhales  Abdomen: soft non-tender non-distended, no hepato/splenomegaly  Ext: no clubbing cyanosis or edema  Skin: no rashes and no petichiae  Neuro: alert and oriented X3 no focal deficits      LABS:  CBC Full  -  ( 15 Sep 2024 11:59 )  WBC Count : 9.33 K/uL  RBC Count : 2.94 M/uL  Hemoglobin : 8.7 g/dL  Hematocrit : 28.7 %  Platelet Count - Automated : 304 K/uL  Mean Cell Volume : 97.6 fl  Mean Cell Hemoglobin : 29.6 pg  Mean Cell Hemoglobin Concentration : 30.3 gm/dL  Auto Neutrophil # : x  Auto Lymphocyte # : x  Auto Monocyte # : x  Auto Eosinophil # : x  Auto Basophil # : x  Auto Neutrophil % : x  Auto Lymphocyte % : x  Auto Monocyte % : x  Auto Eosinophil % : x  Auto Basophil % : x    09-15    137  |  108  |  10  ----------------------------<  146<H>  4.2   |  21<L>  |  0.77    Ca    8.0<L>      15 Sep 2024 11:59    TPro  4.9<L>  /  Alb  1.5<L>  /  TBili  0.3  /  DBili  x   /  AST  21  /  ALT  22  /  AlkPhos  92  09-15        fe studies      WBC trend  9.33 K/uL (09-15-24 @ 11:59)  9.13 K/uL (09-14-24 @ 09:50)  11.32 K/uL (09-13-24 @ 10:18)      Hgb trend  8.7 g/dL (09-15-24 @ 11:59)  8.6 g/dL (09-14-24 @ 09:50)  8.7 g/dL (09-13-24 @ 16:05)  8.7 g/dL (09-13-24 @ 10:18)      plt trend  304 K/uL (09-15-24 @ 11:59)  398 K/uL (09-14-24 @ 09:50)  404 K/uL (09-13-24 @ 10:18)        RADIOLOGY & ADDITIONAL STUDIES:

## 2024-09-15 NOTE — PROGRESS NOTE ADULT - ASSESSMENT
Anemia most likely related to diabetic foot ulcer. r/o osteomyelitis.    has received 3 units PRBC since admission  expect patient to be transfusion dependent until infection resolves  fe studies c/w diagnosis  hgb 8.6    Recommendations:  1.  follow CBC and transfuse as indicated  2.  continue ID/podiatry management  3.  is not a candidate for JHOANA treatment  4.  hold transfusion and observe  5.  no further heme evaluation required at present  please call if additional evaluation required

## 2024-09-15 NOTE — PROGRESS NOTE ADULT - SUBJECTIVE AND OBJECTIVE BOX
SUBJECTIVE:  Patient seen and examined at bedside.  No overnight events.  Patient reports no new complaints at this time.      VITALS  Vital Signs Last 24 Hrs  T(C): 36.9 (15 Sep 2024 04:53), Max: 37.1 (14 Sep 2024 11:15)  T(F): 98.4 (15 Sep 2024 04:53), Max: 98.8 (14 Sep 2024 11:15)  HR: 83 (15 Sep 2024 04:53) (69 - 99)  BP: 116/75 (15 Sep 2024 04:53) (102/71 - 119/76)  BP(mean): --  RR: 18 (15 Sep 2024 04:53) (18 - 18)  SpO2: 93% (15 Sep 2024 04:53) (93% - 97%)    Parameters below as of 15 Sep 2024 04:53  Patient On (Oxygen Delivery Method): room air      PHYSICAL EXAM  GENERAL:  Male lying comfortably in bed in [facility].  HEENT: NC/AT. Sclera white. Mucous membranes moist.  CARDIO: Regular rate and rhythm.    RESPIRATORY:  Nonlabored breathing, no accessory muscle use.   EXTREMITIES: No calf tenderness bilaterally. s/p right partial calcanectomy with wound debridement; with dressings on; foul smelling  NEURO:  A&O x 3    INTAKE & OUTPUT  I&O's Summary    14 Sep 2024 07:01  -  15 Sep 2024 07:00  --------------------------------------------------------  IN: 0 mL / OUT: 2200 mL / NET: -2200 mL      I&O's Detail    14 Sep 2024 07:01  -  15 Sep 2024 07:00  --------------------------------------------------------  IN:  Total IN: 0 mL    OUT:    Indwelling Catheter - Urethral (mL): 2200 mL  Total OUT: 2200 mL    Total NET: -2200 mL    MEDICATIONS  MEDICATIONS  (STANDING):  aMIOdarone    Tablet 200 milliGRAM(s) Oral daily  aMIOdarone    Tablet   Oral   ascorbic acid 500 milliGRAM(s) Oral daily  aspirin enteric coated 81 milliGRAM(s) Oral daily  atorvastatin 40 milliGRAM(s) Oral at bedtime  cetirizine 10 milliGRAM(s) Oral daily  cholecalciferol 1000 Unit(s) Oral daily  dextrose 5%. 1000 milliLiter(s) (100 mL/Hr) IV Continuous <Continuous>  dextrose 5%. 1000 milliLiter(s) (50 mL/Hr) IV Continuous <Continuous>  dextrose 50% Injectable 25 Gram(s) IV Push once  dextrose 50% Injectable 12.5 Gram(s) IV Push once  dextrose 50% Injectable 25 Gram(s) IV Push once  dextrose Oral Gel 15 Gram(s) Oral once  digoxin     Tablet 125 MICROGram(s) Oral daily  enoxaparin Injectable 100 milliGRAM(s) SubCutaneous every 12 hours  famotidine    Tablet 20 milliGRAM(s) Oral daily  ferrous    sulfate 325 milliGRAM(s) Oral two times a day  gabapentin 100 milliGRAM(s) Oral three times a day  glucagon  Injectable 1 milliGRAM(s) IntraMuscular once  insulin lispro (ADMELOG) corrective regimen sliding scale   SubCutaneous three times a day before meals  insulin lispro (ADMELOG) corrective regimen sliding scale   SubCutaneous at bedtime  melatonin 5 milliGRAM(s) Oral at bedtime  metoprolol tartrate 12.5 milliGRAM(s) Oral every 6 hours  midodrine. 15 milliGRAM(s) Oral three times a day  multivitamin 1 Tablet(s) Oral daily  mupirocin 2% Nasal 1 Application(s) Both Nostrils two times a day  naloxegol 25 milliGRAM(s) Oral daily  oxyCODONE  ER Tablet 10 milliGRAM(s) Oral every 12 hours  pantoprazole    Tablet 40 milliGRAM(s) Oral before breakfast  polyethylene glycol 3350 17 Gram(s) Oral daily  senna 2 Tablet(s) Oral at bedtime  sucralfate 1 Gram(s) Oral two times a day  tiotropium 2.5 MICROgram(s)/olodaterol 2.5 MICROgram(s) (STIOLTO) Inhaler 2 Puff(s) Inhalation daily  trimethoprim  160 mG/sulfamethoxazole 800 mG 1 Tablet(s) Oral two times a day    MEDICATIONS  (PRN):  acetaminophen     Tablet .. 650 milliGRAM(s) Oral every 6 hours PRN Temp greater or equal to 38C (100.4F)  HYDROmorphone  Injectable 1 milliGRAM(s) IV Push daily PRN Severe Pain (7 - 10)  oxyCODONE    IR 2.5 milliGRAM(s) Oral every 4 hours PRN Moderate Pain (4 - 6)  sodium chloride 0.65% Nasal 1 Spray(s) Both Nostrils daily PRN Nasal Congestion  traMADol 50 milliGRAM(s) Oral every 4 hours PRN Mild Pain (1 - 3)    LABS:                        8.6    9.13  )-----------( 398      ( 14 Sep 2024 09:50 )             28.2     RADIOLOGY:   < from: CT Angio Abd Aorta w/run-off w/ IV Cont (09.13.24 @ 20:29) >    ACC: [id number] EXAM:  CT ANGIO ABD AOR W RUN(W)AW IC   ORDERED BY:   NUNU MCDONALD     PROCEDURE DATE:  09/13/2024          INTERPRETATION:  CLINICAL INFORMATION: Nonhealing right heel ulcer.   Gangrenous right foot. Cultures from right foot debridement positive for   bacteria.    COMPARISON: Right lower extremity arterial duplex ultrasound 9/6/2024.   Noncontrast CT abdomen pelvis 9/10/2024    CONTRAST/COMPLICATIONS:  IV Contrast: Omnipaque 350  90 cc administered   10 cc discarded  Oral Contrast: NONE  Complications: None reported at time of study completion    CT ANGIOGRAM ABDOMEN, PELVIS, AND LOWER EXTREMITIES:    PROCEDURE:  Initially, nonenhanced CT was obtained through the calves. Then,   following the rapid administration ofintravenous contrast, CT   angiography was performed through the abdomen, pelvis, and lower   extremities down to the toes.  Delayed images were also obtained.   Sagittal and coronal reformats as well as 3D reconstructions were   performed.    FINDINGS:    CENTRAL ARTERIAL SYSTEM:    Ectasia of the infrarenal abdominal aorta up to 2.8 cm.   Diffuse/multifocal aortoiliac calcified plaque with up to mild category   disease. Visceral arteries are patent. Central vein patency is not   assessed on this study due to timing of contrast.    RIGHT LOWER EXTREMITY:    Right common femoral, deep femoral, superficial femoral, and popliteal   arteries demonstrate scattered minimal category plaque. Right   trifurcation arteries are opacified with contrast to the distal right   leg. Calcified plaque at the tibioperoneal trunk results in mild   stenosis. Distal right peroneal artery at the ankle is difficult to   assess due to tiny vessel caliber.    Right calf and foot soft tissue edema is noted. Postoperative changes of   partial right calcaneus resection noted. Fluid and locules of air are   noted along the plantar aspect of the right foot along flexor digitorum   muscles, images [id number] series 303, possibly the result of recent   reported soft tissue debridement. Soft tissue infection/gas forming   infection cannot be excluded. Recommend dedicated right foot cross   sectional imaging for further evaluation. Right midfoot arthrosis is   noted.    LEFT LOWER EXTREMITY:    Left common femoral, deep femoral, superficial femoral, and popliteal   arteries demonstrates scattered minimal category plaque. Left posterior   tibial artery is opacified into the distal leg. Anterior tibial artery   calcified plaque in proximal segment is difficult to quantify due to   small vessel caliber. Anterior tibial and peroneal arteries taper at the   distal leg.    Left calf and left foot soft tissue edema is noted. Left foot   transmetatarsal amputation postoperative changes are noted. Left midfoot   arthrosis is noted.    ADDITIONAL FINDINGS:    Small bilateral pleural effusions and bibasilar opacities, likely   reflecting atelectasis. Tiny 2 mm nodule along the horizontal fissure is   likely an intrafissural lymph node. Coronary artery calcificationsand   mitral annular calcification. Trace to small dependent pericardial fluid.    Diffusely heterogeneous liver parenchyma, similar to prior CT. Correlate   with MRI to exclude lesion. Few calcified granulomas of the liver. No   biliary distention. Unremarkable CT appearance of the gallbladder. No   acute peripancreatic inflammation. Spleen size is within normal limits.   Previously described splenic hypodensity is incompletely characterized on   this study due to phase of contrast. Adrenals areunremarkable. No   hydronephrosis or obstructing ureteral calculus. Bilateral renal   parenchymal cortical scarring and volume loss. Focal wedge-shaped   hypodensity of the left interpolar kidney on image 46 series 3, is   incompletely characterized on this study. Renal infection and renal   infarct are in the differential. Additional subcentimeter renal   hypodensities are too small to characterize. Minimally distended urinary   bladder with in situ Aguilera catheter. Air within the bladder is likely due   to presence of the Aguilera catheter. Prostate size is within normal limits.    Stomach is partially distended. No small bowel distention. Appendix is   not obstructed. Mild stool burden of the colon limits evaluation of the   colonic mucosa. Noascites. Moderate-sized fat-containing umbilical   hernia. Small fat-containing left inguinal hernia. Degenerative changes   of the bones and osseous demineralization. Old left rib fractures.   Bilateral lower extremity musculoskeletal/soft tissues findings as above.    IMPRESSION:    VASCULAR:  -No significant aortoiliac stenosis. No hemodynamically significant right   or left leg femoropopliteal arterial stenosis.  -Three-vessel contrast opacification to the distal right leg. Calcified   plaqueat the right tibioperoneal trunk results in mild stenosis. Distal   right peroneal artery at the ankle is difficult to assess due to tiny   vessel caliber.  -Left leg with diminished opacification of the distal anterior tibial and   peroneal arteries. Left posterior tibial artery is opacified to the foot.    NON-VASCULAR:  -Right calf and foot soft tissue edema is noted. Postoperative changes of   partial right calcaneus resection noted. Fluid and locules of air are   noted along the plantar aspect of the right foot along flexor digitorum   muscles, images [id number] series 303, possibly the result of recent   reported soft tissue debridement. Soft tissue infection/gas forming   infection cannot be excluded. Recommend dedicated right foot cross   sectional imaging for further evaluation.  -Left calf and left foot soft tissue edema is noted. Left foot   transmetatarsal amputation postoperative changes are noted. Bilateral   midfoot arthrosis.  -Diffusely heterogeneous liver parenchyma, similar to prior CT. Correlate   with MRI to exclude lesion. Previously described splenic hypodensity is   incompletely characterized on this study due to phase of contrast.  -Focal wedge-shaped hypodensity of the left interpolar kidney on image 46   series3, is incompletely characterized on this study. Renal infection   and renal infarct are in the differential. Correlate clinically.      ASSESSMENT & PLAN  55 y/o male with h/o AFib, indwelling Aguilera, CAD s/p stents, CVA, DMT 2, Hypertension, osteomyelitis s/p partial Right calcanectomy & soft tissue debridement by Podiatry. CTA run off demonstrates left leg with diminished opacification of the distal anterior tibial and peroneal arteries. Left posterior tibial artery is opacified to the foot.    - Further vascular surgical recommendations pending discussion with Dr. Neville and Dr. Hardin  - Likely need to switch therapeutic LVX to hep for possible angio Monday or Tuesday

## 2024-09-15 NOTE — PROGRESS NOTE ADULT - PROBLEM SELECTOR PLAN 1
S/P IV ABX - zosyn per ID  ID eval with dr. lan appreciated  ? OM  ? MR  S/p debridement and partial calcanectomy per podiatry   may need BKA  vascular eval  now for angio per vascular on ? coming tuesday

## 2024-09-15 NOTE — PROGRESS NOTE ADULT - SUBJECTIVE AND OBJECTIVE BOX
Date of Service 09-15-24 @ 15:10    Patient is a 54y old  Male who presents with a chief complaint of AF (15 Sep 2024 12:33)      INTERVAL /OVERNIGHT EVENTS: waiting for angio    MEDICATIONS  (STANDING):  aMIOdarone    Tablet 200 milliGRAM(s) Oral daily  aMIOdarone    Tablet   Oral   ascorbic acid 500 milliGRAM(s) Oral daily  aspirin enteric coated 81 milliGRAM(s) Oral daily  atorvastatin 40 milliGRAM(s) Oral at bedtime  cetirizine 10 milliGRAM(s) Oral daily  cholecalciferol 1000 Unit(s) Oral daily  dextrose 5%. 1000 milliLiter(s) (100 mL/Hr) IV Continuous <Continuous>  dextrose 5%. 1000 milliLiter(s) (50 mL/Hr) IV Continuous <Continuous>  dextrose 50% Injectable 12.5 Gram(s) IV Push once  dextrose 50% Injectable 25 Gram(s) IV Push once  dextrose 50% Injectable 25 Gram(s) IV Push once  dextrose Oral Gel 15 Gram(s) Oral once  digoxin     Tablet 125 MICROGram(s) Oral daily  enoxaparin Injectable 100 milliGRAM(s) SubCutaneous every 12 hours  famotidine    Tablet 20 milliGRAM(s) Oral daily  ferrous    sulfate 325 milliGRAM(s) Oral two times a day  gabapentin 100 milliGRAM(s) Oral three times a day  glucagon  Injectable 1 milliGRAM(s) IntraMuscular once  insulin lispro (ADMELOG) corrective regimen sliding scale   SubCutaneous three times a day before meals  insulin lispro (ADMELOG) corrective regimen sliding scale   SubCutaneous at bedtime  melatonin 5 milliGRAM(s) Oral at bedtime  metoprolol tartrate 12.5 milliGRAM(s) Oral every 6 hours  midodrine. 15 milliGRAM(s) Oral three times a day  multivitamin 1 Tablet(s) Oral daily  mupirocin 2% Nasal 1 Application(s) Both Nostrils two times a day  naloxegol 25 milliGRAM(s) Oral daily  oxyCODONE  ER Tablet 10 milliGRAM(s) Oral every 12 hours  pantoprazole    Tablet 40 milliGRAM(s) Oral before breakfast  polyethylene glycol 3350 17 Gram(s) Oral daily  senna 2 Tablet(s) Oral at bedtime  sucralfate 1 Gram(s) Oral two times a day  tiotropium 2.5 MICROgram(s)/olodaterol 2.5 MICROgram(s) (STIOLTO) Inhaler 2 Puff(s) Inhalation daily  trimethoprim  160 mG/sulfamethoxazole 800 mG 1 Tablet(s) Oral two times a day    MEDICATIONS  (PRN):  acetaminophen     Tablet .. 650 milliGRAM(s) Oral every 6 hours PRN Temp greater or equal to 38C (100.4F)  HYDROmorphone  Injectable 1 milliGRAM(s) IV Push daily PRN Severe Pain (7 - 10)  oxyCODONE    IR 2.5 milliGRAM(s) Oral every 4 hours PRN Moderate Pain (4 - 6)  sodium chloride 0.65% Nasal 1 Spray(s) Both Nostrils daily PRN Nasal Congestion  traMADol 50 milliGRAM(s) Oral every 4 hours PRN Mild Pain (1 - 3)      Allergies    fish (Hives)  ertapenem (Blisters; Rash)    Intolerances        REVIEW OF SYSTEMS:  CONSTITUTIONAL: No fever, weight loss, or fatigue  EYES: No eye pain, visual disturbances, or discharge  ENMT:  No difficulty hearing, tinnitus, vertigo; No sinus or throat pain  NECK: No pain or stiffness  RESPIRATORY: No cough, wheezing, chills or hemoptysis; No shortness of breath  CARDIOVASCULAR: No chest pain, palpitations, dizziness, or leg swelling  GASTROINTESTINAL: No abdominal or epigastric pain. No nausea, vomiting, or hematemesis; No diarrhea or constipation. No melena or hematochezia.  GENITOURINARY: No dysuria, frequency, hematuria, or incontinence  NEUROLOGICAL: No headaches, memory loss, loss of strength, numbness, or tremors  SKIN: No itching, burning, rashes, or lesions   LYMPH NODES: No enlarged glands  ENDOCRINE: No heat or cold intolerance; No hair loss; No polydipsia or polyuria  MUSCULOSKELETAL: No joint pain or swelling; No muscle, back, or extremity pain  PSYCHIATRIC: No depression, anxiety, mood swings, or difficulty sleeping  HEME/LYMPH: No easy bruising, or bleeding gums  ALLERGY AND IMMUNOLOGIC: No hives or eczema    Vital Signs Last 24 Hrs  T(C): 36.1 (15 Sep 2024 11:27), Max: 36.9 (14 Sep 2024 19:45)  T(F): 97 (15 Sep 2024 11:27), Max: 98.5 (14 Sep 2024 19:45)  HR: 94 (15 Sep 2024 11:27) (69 - 99)  BP: 115/71 (15 Sep 2024 11:27) (102/71 - 117/77)  BP(mean): --  RR: 18 (15 Sep 2024 11:27) (18 - 18)  SpO2: 93% (15 Sep 2024 11:27) (93% - 97%)    Parameters below as of 15 Sep 2024 11:27  Patient On (Oxygen Delivery Method): room air        PHYSICAL EXAM:  GENERAL: NAD, well-groomed, well-developed  HEAD:  Atraumatic, Normocephalic  EYES: EOMI, PERRLA, conjunctiva and sclera clear  ENMT: No tonsillar erythema, exudates, or enlargement; Moist mucous membranes, Good dentition, No lesions  NECK: Supple, No JVD, Normal thyroid  NERVOUS SYSTEM:  Alert & Oriented X3, Good concentration; Motor Strength 5/5 B/L upper and lower extremities; DTRs 2+ intact and symmetric  CHEST/LUNG: Clear to auscultation bilaterally; No rales, rhonchi, wheezing, or rubs  HEART: Regular rate and rhythm; No murmurs, rubs, or gallops  ABDOMEN: Soft, Nontender, Nondistended; Bowel sounds present  EXTREMITIES:  right foot in surgical bandage  LYMPH: No lymphadenopathy noted  SKIN: No rashes or lesions    LABS:                        8.7    9.33  )-----------( 304      ( 15 Sep 2024 11:59 )             28.7     15 Sep 2024 11:59    137    |  108    |  10     ----------------------------<  146    4.2     |  21     |  0.77     Ca    8.0        15 Sep 2024 11:59    TPro  4.9    /  Alb  1.5    /  TBili  0.3    /  DBili  x      /  AST  21     /  ALT  22     /  AlkPhos  92     15 Sep 2024 11:59      Urinalysis Basic - ( 15 Sep 2024 11:59 )    Color: x / Appearance: x / SG: x / pH: x  Gluc: 146 mg/dL / Ketone: x  / Bili: x / Urobili: x   Blood: x / Protein: x / Nitrite: x   Leuk Esterase: x / RBC: x / WBC x   Sq Epi: x / Non Sq Epi: x / Bacteria: x      CAPILLARY BLOOD GLUCOSE      POCT Blood Glucose.: 157 mg/dL (15 Sep 2024 11:12)  POCT Blood Glucose.: 176 mg/dL (15 Sep 2024 07:59)  POCT Blood Glucose.: 148 mg/dL (14 Sep 2024 22:28)  POCT Blood Glucose.: 120 mg/dL (14 Sep 2024 17:06)      RADIOLOGY & ADDITIONAL TESTS:    Notes Reviewed:  [x ] YES  [ ] NO    Care Discussed with Consultants/Other Providers [ x] YES  [ ] NO

## 2024-09-15 NOTE — PROGRESS NOTE ADULT - SUBJECTIVE AND OBJECTIVE BOX
[facility] -- Brittany Lutz Pannella, Patel, Savella, Goodger, Cohen  Office # [phone]    Follow Up:  Cardiac optimization     Subjective/Observations: seen and examined, asleep, easily awaken, laying almost flat in bed, denies chest pain, dyspnea, palpitations or dizziness, orthopnea and PND. Tolerating room air. no events overnight       REVIEW OF SYSTEMS: All other review of systems is negative unless indicated above  PAST MEDICAL & SURGICAL HISTORY:  Diabetes      Diabetes mellitus with no complication      Afib      Hypertension      BPH (benign prostatic hyperplasia)      Perforated gastric ulcer  s/p emergent ex-lap omentopexy and plication 6/2019      Pulmonary embolism      History of non-ST elevation myocardial infarction (NSTEMI)      Osteomyelitis  s/p debridement      CAD S/P percutaneous coronary angioplasty      Cerebrovascular accident      H/O abdominal surgery      Perforated gastric ulcer      Traumatic amputation of left foot, initial encounter        MEDICATIONS  (STANDING):  aMIOdarone    Tablet 200 milliGRAM(s) Oral daily  aMIOdarone    Tablet   Oral   ascorbic acid 500 milliGRAM(s) Oral daily  aspirin enteric coated 81 milliGRAM(s) Oral daily  atorvastatin 40 milliGRAM(s) Oral at bedtime  cetirizine 10 milliGRAM(s) Oral daily  cholecalciferol 1000 Unit(s) Oral daily  dextrose 5%. 1000 milliLiter(s) (100 mL/Hr) IV Continuous <Continuous>  dextrose 5%. 1000 milliLiter(s) (50 mL/Hr) IV Continuous <Continuous>  dextrose 50% Injectable 12.5 Gram(s) IV Push once  dextrose 50% Injectable 25 Gram(s) IV Push once  dextrose 50% Injectable 25 Gram(s) IV Push once  dextrose Oral Gel 15 Gram(s) Oral once  digoxin     Tablet 125 MICROGram(s) Oral daily  enoxaparin Injectable 100 milliGRAM(s) SubCutaneous every 12 hours  famotidine    Tablet 20 milliGRAM(s) Oral daily  ferrous    sulfate 325 milliGRAM(s) Oral two times a day  gabapentin 100 milliGRAM(s) Oral three times a day  glucagon  Injectable 1 milliGRAM(s) IntraMuscular once  insulin lispro (ADMELOG) corrective regimen sliding scale   SubCutaneous three times a day before meals  insulin lispro (ADMELOG) corrective regimen sliding scale   SubCutaneous at bedtime  melatonin 5 milliGRAM(s) Oral at bedtime  metoprolol tartrate 12.5 milliGRAM(s) Oral every 6 hours  midodrine. 15 milliGRAM(s) Oral three times a day  multivitamin 1 Tablet(s) Oral daily  mupirocin 2% Nasal 1 Application(s) Both Nostrils two times a day  naloxegol 25 milliGRAM(s) Oral daily  oxyCODONE  ER Tablet 10 milliGRAM(s) Oral every 12 hours  pantoprazole    Tablet 40 milliGRAM(s) Oral before breakfast  polyethylene glycol 3350 17 Gram(s) Oral daily  senna 2 Tablet(s) Oral at bedtime  sucralfate 1 Gram(s) Oral two times a day  tiotropium 2.5 MICROgram(s)/olodaterol 2.5 MICROgram(s) (STIOLTO) Inhaler 2 Puff(s) Inhalation daily  trimethoprim  160 mG/sulfamethoxazole 800 mG 1 Tablet(s) Oral two times a day    MEDICATIONS  (PRN):  acetaminophen     Tablet .. 650 milliGRAM(s) Oral every 6 hours PRN Temp greater or equal to 38C (100.4F)  HYDROmorphone  Injectable 1 milliGRAM(s) IV Push daily PRN Severe Pain (7 - 10)  oxyCODONE    IR 2.5 milliGRAM(s) Oral every 4 hours PRN Moderate Pain (4 - 6)  sodium chloride 0.65% Nasal 1 Spray(s) Both Nostrils daily PRN Nasal Congestion  traMADol 50 milliGRAM(s) Oral every 4 hours PRN Mild Pain (1 - 3)    Allergies    fish (Hives)  ertapenem (Blisters; Rash)    Intolerances      Vital Signs Last 24 Hrs  T(C): 36.1 (15 Sep 2024 11:27), Max: 36.9 (14 Sep 2024 19:45)  T(F): 97 (15 Sep 2024 11:27), Max: 98.5 (14 Sep 2024 19:45)  HR: 94 (15 Sep 2024 11:27) (69 - 99)  BP: 115/71 (15 Sep 2024 11:27) (102/71 - 117/77)  BP(mean): --  RR: 18 (15 Sep 2024 11:27) (18 - 18)  SpO2: 93% (15 Sep 2024 11:27) (93% - 97%)    Parameters below as of 15 Sep 2024 11:27  Patient On (Oxygen Delivery Method): room air      I&O's Summary    14 Sep 2024 07:01  -  15 Sep 2024 07:00  --------------------------------------------------------  IN: 0 mL / OUT: 2200 mL / NET: -2200 mL    15 Sep 2024 07:01  -  15 Sep 2024 12:33  --------------------------------------------------------  IN: 0 mL / OUT: 1800 mL / NET: -1800 mL          TELE:   PHYSICAL EXAM:  Constitutional: NAD, awake and alert  HEENT: Moist Mucous Membranes, Anicteric  Pulmonary: Non-labored, breath sounds are clear bilaterally, No wheezing, rales or rhonchi  Cardiovascular: IRRRegular, S1 and S2, No murmurs, rubs, gallops or clicks  Gastrointestinal: Bowel Sounds present, soft, nontender.   Lymph: No peripheral edema. No lymphadenopathy.  Skin: No visible rashes or ulcers.  Psych:  Mood & affect appropriate  LABS: All Labs Reviewed:                        8.7    9.33  )-----------( 304      ( 15 Sep 2024 11:59 )             28.7                         8.6    9.13  )-----------( 398      ( 14 Sep 2024 09:50 )             28.2                         8.7    x     )-----------( x        ( 13 Sep 2024 16:05 )             27.6     13 Sep 2024 10:18    139    |  109    |  9      ----------------------------<  168    4.0     |  23     |  0.86     Ca    7.7        13 Sep 2024 10:18    TPro  4.7    /  Alb  1.4    /  TBili  0.2    /  DBili  x      /  AST  26     /  ALT  24     /  AlkPhos  95     13 Sep 2024 10:18              TRANSTHORACIC ECHOCARDIOGRAM REPORT  ________________________________________________________________________________                                      _______       Pt. Name:       SARAH MORRISON Study Date:    9/5/2024  MRN:            [id number]         YOB: 1970  Accession #:    [id number]        Age:           54 years  Account#:       [id number]       Gender:        M  Heart Rate:                      Height:        74.02 in (188.00 cm)  Rhythm:                          Weight:        207.23 lb (94.00 kg)  Blood Pressure: 97/60 mmHg       BSA/BMI:       2.21 m² / 26.60 kg/m²  ________________________________________________________________________________________  Referring Physician:    [id number] Hill Brizuela  Interpreting Physician: Claudette Jacobs  Primary Sonographer:    Mounika FELDMAN    CPT:               ECHO TTE WO CON COMP W DOPP - 18452.m  Indication(s):     Abnormal electrocardiogram ECG/EKG - R94.31  Procedure:         Transthoracic echocardiogram with2-D, M-mode and complete                     spectral and color flow Doppler.  Ordering Location: [facility]  Admission Status:  Inpatient  Study Information: Image quality for this study is technically difficult.    _______________________________________________________________________________________     CONCLUSIONS:      1. Technically difficult image quality.   2. Left ventricular cavity is normal in size. Left ventricular wall thickness is normal. Left ventricular systolic function is normal withan ejection fraction of 69 % by Castañeda's method of disks. There are no regional wall motion abnormalities seen.   3. There is increased LV mass and concentric hypertrophy.   4. Normal right ventricular cavity size and normal right ventricular systolic function.   5. Left atrium is severely dilated.   6. The right atrium is severely dilated.   7. There is calcification of the mitral valve annulus.   8. Mitral valve leaflets are diffusely calcified.   9. Moderate to severe mitral regurgitation.  10. Trileaflet aortic valve with normal systolic excursion. There is calcification of the aortic valve leaflets.  11. Mild tricuspid regurgitation.  12. Estimated pulmonary artery systolic pressure is 39 mmHg, consistent with borderline pulmonary hypertension.  13. The inferior vena cava is normal in size measuring 1.75 cm in diameter, (normal <2.1cm) with normal inspiratory collapse (normal >50%) consistent with normal right atrial pressure (~3, range 0-5mmHg).    ________________________________________________________________________________________  FINDINGS:     Left Ventricle:  The left ventricular cavity is normal in size. Left ventricular wall thickness is normal. Left ventricular systolic function is normal with a calculated ejection fraction of 69 % by the Castañeda's biplane method of disks. There are no regional wall motion abnormalities seen. There is increased LV mass and concentric hypertrophy. The left ventricular diastolic function is indeterminate.     Right Ventricle:  The right ventricular cavity is normal in size and right ventricular systolic function is normal.     Left Atrium:  The left atrium is severely dilated with an indexed volume of 31.95 ml/m².     Right Atrium:  The right atrium is severely dilated with an indexed volume of 26.74 ml/m².     Aortic Valve:  The aortic valve appears trileaflet with normal systolic excursion. There is calcification of the aortic valve leaflets.     Mitral Valve:  There is calcification of the mitral valve annulus. Mitral valve leaflets are diffusely calcified. There is moderate to severe mitral regurgitation.     Tricuspid Valve:  Structurally normal tricuspid valve with normal leaflet excursion. There is mild tricuspid regurgitation. Estimated pulmonary artery systolicpressure is 39 mmHg, consistent with borderline pulmonary hypertension.     Pulmonic Valve:  The pulmonic valve was not well visualized. There is trace pulmonic regurgitation.     Systemic Veins:  The inferior vena cava is normal in size measuring 1.75 cm in diameter, (normal <2.1cm) with normal inspiratory collapse (normal >50%) consistent with normal right atrial pressure (~3, range 0-5mmHg).  ____________________________________________________________________  QUANTITATIVE DATA:  Left Ventricle Measurements: (Indexed to BSA)     IVSd (2D):   1.4 cm  LVPWd (2D):  1.3 cm  LVIDd (2D):  5.4 cm  LVIDs (2D):  4.0 cm  LV Mass:     306 g  138.7 g/m²  LV Vol d, MOD A2C: 87.4 ml 39.62 ml/m²  LV Vol d, MOD A4C: 81.2 ml 36.78 ml/m²  LV Vol d, MOD BP: 92.3 ml 41.82 ml/m²  LV Vol s, MOD A2C: 29.0 ml 13.13 ml/m²  LV Vol s, MOD A4C: 28.3 ml 12.82 ml/m²  LV Vol s, MOD BP:  28.9 ml 13.08 ml/m²  LVOT SV MOD BP:    63.4 ml  LV EF% MOD BP:     69 %     MV E Vmax:    1.19 m/s  e' lateral:   13.10 cm/s  e'medial:    10.70 cm/s  E/e' lateral: 9.05  E/e' medial:  12.00  E/e' Average: 9.96  MV DT:        148 msec    Aorta Measurements: (Normal range) (Indexed to BSA)     Ao Root d 3.23 cm (3.1 - 3.7 cm) 1.46 cm/m²            Left Atrium Measurements: (Indexed to BSA)  LA Diam 2D: 4.72 cm         Right Atrial Measurements:     RA Vol:       59.00 ml  RA Vol Index: 26.74 ml/m²       LVOT / RVOT/ Qp/Qs Data: (Indexed to BSA)  LVOT Diameter: 2.29 cm  LVOT Area:     4.12 cm²    Mitral Valve Measurements:     MV E Vmax: 1.2 m/s       Tricuspid Valve Measurements:     TR Vmax:          3.0 m/s  TR Peak Gradient: 35.9 mmHg  RA Pressure:      3 mmHg  PASP:             39 mmHg    ________________________________________________________________________________________  Electronically signed on 9/6/2024 at 10:38:59 AM by Claudette Jacobs         *** Final ***

## 2024-09-15 NOTE — PROGRESS NOTE ADULT - PROBLEM SELECTOR PLAN 1
Patient evaluated and chart reviewed.   s/p right foot partial calcanectomy and soft tissue debridement.  No strikethrough bleeding noted to bandages today.  Reapplied dry sterile dressing  Surgical pathology report reviewed  Continue abx per ID recs.   Patient will continue to be monitored closely by wound care  Plan to be discussed with attending.  Wound Care Instructions:  -Keep dressing clean, dry, and intact to the affected area  -Apply adaptic, gauze, abd, paul, change every other day   -Monitor for any signs of infection including redness, swelling in the leg above the bandage, nausea/vomiting/fever/chills/chest pain/shortness of breath, if any are present patient must report to the emergency department immediately  -Patient is to follow up with Dr. Hale/Dr. Todd/ Dr. Jewell  within 5 days after discharge at [facility] [place]. Please call to make an appointment [phone] Patient evaluated and chart reviewed.   s/p right foot partial calcanectomy and soft tissue debridement.  No strikethrough bleeding noted to bandages today.  Reapplied dry sterile dressing  Surgical pathology report reviewed  Continue abx per ID recs.   Patient will continue to be monitored closely by wound care  Plan to be discussed with attending.

## 2024-09-16 LAB
GLUCOSE BLDC GLUCOMTR-MCNC: 149 MG/DL — HIGH (ref 70–99)
GLUCOSE BLDC GLUCOMTR-MCNC: 161 MG/DL — HIGH (ref 70–99)
GLUCOSE BLDC GLUCOMTR-MCNC: 167 MG/DL — HIGH (ref 70–99)
GLUCOSE BLDC GLUCOMTR-MCNC: 93 MG/DL — SIGNIFICANT CHANGE UP (ref 70–99)

## 2024-09-16 PROCEDURE — 99232 SBSQ HOSP IP/OBS MODERATE 35: CPT

## 2024-09-16 PROCEDURE — 99024 POSTOP FOLLOW-UP VISIT: CPT

## 2024-09-16 RX ORDER — HEPARIN SODIUM,BOVINE 1000/ML
8000 VIAL (ML) INJECTION EVERY 6 HOURS
Refills: 0 | Status: DISCONTINUED | OUTPATIENT
Start: 2024-09-16 | End: 2024-09-17

## 2024-09-16 RX ORDER — HEPARIN SODIUM,BOVINE 1000/ML
4000 VIAL (ML) INJECTION EVERY 6 HOURS
Refills: 0 | Status: DISCONTINUED | OUTPATIENT
Start: 2024-09-16 | End: 2024-09-17

## 2024-09-16 RX ORDER — HEPARIN SODIUM,BOVINE 1000/ML
VIAL (ML) INJECTION
Qty: 25000 | Refills: 0 | Status: DISCONTINUED | OUTPATIENT
Start: 2024-09-16 | End: 2024-09-17

## 2024-09-16 RX ADMIN — Medication 40 MILLIGRAM(S): at 21:26

## 2024-09-16 RX ADMIN — NALOXEGOL OXALATE 25 MILLIGRAM(S): 25 TABLET, FILM COATED ORAL at 12:33

## 2024-09-16 RX ADMIN — Medication 1800 UNIT(S)/HR: at 19:05

## 2024-09-16 RX ADMIN — Medication 100 MILLIGRAM(S): at 05:04

## 2024-09-16 RX ADMIN — OXYCODONE HYDROCHLORIDE 10 MILLIGRAM(S): 5 TABLET ORAL at 17:52

## 2024-09-16 RX ADMIN — Medication 325 MILLIGRAM(S): at 17:51

## 2024-09-16 RX ADMIN — TIOTROPIUM BROMIDE AND OLODATEROL 2 PUFF(S): 3.124; 2.736 SPRAY, METERED RESPIRATORY (INHALATION) at 12:40

## 2024-09-16 RX ADMIN — FAMOTIDINE 20 MILLIGRAM(S): 10 INJECTION INTRAVENOUS at 12:32

## 2024-09-16 RX ADMIN — POLYETHYLENE GLYCOL 3350 17 GRAM(S): 17 POWDER, FOR SOLUTION ORAL at 12:35

## 2024-09-16 RX ADMIN — METOPROLOL TARTRATE 12.5 MILLIGRAM(S): 100 TABLET ORAL at 00:30

## 2024-09-16 RX ADMIN — MIDODRINE HYDROCHLORIDE 15 MILLIGRAM(S): 5 TABLET ORAL at 17:51

## 2024-09-16 RX ADMIN — OXYCODONE HYDROCHLORIDE 2.5 MILLIGRAM(S): 5 TABLET ORAL at 23:53

## 2024-09-16 RX ADMIN — SULFAMETHOXAZOLE AND TRIMETHOPRIM 1 TABLET(S): 800; 160 TABLET ORAL at 05:05

## 2024-09-16 RX ADMIN — Medication 81 MILLIGRAM(S): at 12:33

## 2024-09-16 RX ADMIN — Medication 1800 UNIT(S)/HR: at 15:19

## 2024-09-16 RX ADMIN — DIGOXIN 125 MICROGRAM(S): 0.12 TABLET ORAL at 05:05

## 2024-09-16 RX ADMIN — MIDODRINE HYDROCHLORIDE 15 MILLIGRAM(S): 5 TABLET ORAL at 12:32

## 2024-09-16 RX ADMIN — SUCRALFATE 1 GRAM(S): 1 SUSPENSION ORAL at 17:52

## 2024-09-16 RX ADMIN — Medication 100 MILLIGRAM(S): at 12:39

## 2024-09-16 RX ADMIN — Medication 10 MILLIGRAM(S): at 12:31

## 2024-09-16 RX ADMIN — ENOXAPARIN SODIUM 100 MILLIGRAM(S): 100 INJECTION SUBCUTANEOUS at 05:03

## 2024-09-16 RX ADMIN — SULFAMETHOXAZOLE AND TRIMETHOPRIM 1 TABLET(S): 800; 160 TABLET ORAL at 17:54

## 2024-09-16 RX ADMIN — Medication 325 MILLIGRAM(S): at 05:06

## 2024-09-16 RX ADMIN — MIDODRINE HYDROCHLORIDE 15 MILLIGRAM(S): 5 TABLET ORAL at 05:06

## 2024-09-16 RX ADMIN — OXYCODONE HYDROCHLORIDE 10 MILLIGRAM(S): 5 TABLET ORAL at 05:03

## 2024-09-16 RX ADMIN — Medication 40 MILLIGRAM(S): at 05:04

## 2024-09-16 RX ADMIN — SUCRALFATE 1 GRAM(S): 1 SUSPENSION ORAL at 05:04

## 2024-09-16 RX ADMIN — OXYCODONE HYDROCHLORIDE 10 MILLIGRAM(S): 5 TABLET ORAL at 06:00

## 2024-09-16 RX ADMIN — Medication 500 MILLIGRAM(S): at 12:32

## 2024-09-16 RX ADMIN — Medication 100 MILLIGRAM(S): at 21:26

## 2024-09-16 RX ADMIN — METOPROLOL TARTRATE 12.5 MILLIGRAM(S): 100 TABLET ORAL at 12:30

## 2024-09-16 RX ADMIN — OXYCODONE HYDROCHLORIDE 200 MILLIGRAM(S): 15 TABLET ORAL at 05:06

## 2024-09-16 RX ADMIN — OXYCODONE HYDROCHLORIDE 10 MILLIGRAM(S): 5 TABLET ORAL at 18:52

## 2024-09-16 RX ADMIN — METOPROLOL TARTRATE 12.5 MILLIGRAM(S): 100 TABLET ORAL at 17:51

## 2024-09-16 RX ADMIN — METOPROLOL TARTRATE 12.5 MILLIGRAM(S): 100 TABLET ORAL at 23:53

## 2024-09-16 RX ADMIN — METOPROLOL TARTRATE 12.5 MILLIGRAM(S): 100 TABLET ORAL at 05:05

## 2024-09-16 RX ADMIN — Medication 1000 UNIT(S): at 12:32

## 2024-09-16 RX ADMIN — Medication 5 MILLIGRAM(S): at 21:26

## 2024-09-16 RX ADMIN — Medication 1: at 12:43

## 2024-09-16 RX ADMIN — Medication 1 TABLET(S): at 12:32

## 2024-09-16 NOTE — PROGRESS NOTE ADULT - SUBJECTIVE AND OBJECTIVE BOX
Date of Service 09-16-24 @ 14:10    Patient is a 54y old  Male who presents with a chief complaint of AF (16 Sep 2024 13:09)      INTERVAL /OVERNIGHT EVENTS: waiting for angiogram    MEDICATIONS  (STANDING):  aMIOdarone    Tablet   Oral   aMIOdarone    Tablet 200 milliGRAM(s) Oral daily  ascorbic acid 500 milliGRAM(s) Oral daily  aspirin enteric coated 81 milliGRAM(s) Oral daily  atorvastatin 40 milliGRAM(s) Oral at bedtime  cetirizine 10 milliGRAM(s) Oral daily  cholecalciferol 1000 Unit(s) Oral daily  dextrose 5%. 1000 milliLiter(s) (100 mL/Hr) IV Continuous <Continuous>  dextrose 5%. 1000 milliLiter(s) (50 mL/Hr) IV Continuous <Continuous>  dextrose 50% Injectable 25 Gram(s) IV Push once  dextrose 50% Injectable 12.5 Gram(s) IV Push once  dextrose 50% Injectable 25 Gram(s) IV Push once  dextrose Oral Gel 15 Gram(s) Oral once  digoxin     Tablet 125 MICROGram(s) Oral daily  famotidine    Tablet 20 milliGRAM(s) Oral daily  ferrous    sulfate 325 milliGRAM(s) Oral two times a day  gabapentin 100 milliGRAM(s) Oral three times a day  glucagon  Injectable 1 milliGRAM(s) IntraMuscular once  insulin lispro (ADMELOG) corrective regimen sliding scale   SubCutaneous at bedtime  insulin lispro (ADMELOG) corrective regimen sliding scale   SubCutaneous three times a day before meals  melatonin 5 milliGRAM(s) Oral at bedtime  metoprolol tartrate 12.5 milliGRAM(s) Oral every 6 hours  midodrine. 15 milliGRAM(s) Oral three times a day  multivitamin 1 Tablet(s) Oral daily  mupirocin 2% Nasal 1 Application(s) Both Nostrils two times a day  naloxegol 25 milliGRAM(s) Oral daily  oxyCODONE  ER Tablet 10 milliGRAM(s) Oral every 12 hours  pantoprazole    Tablet 40 milliGRAM(s) Oral before breakfast  polyethylene glycol 3350 17 Gram(s) Oral daily  senna 2 Tablet(s) Oral at bedtime  sucralfate 1 Gram(s) Oral two times a day  tiotropium 2.5 MICROgram(s)/olodaterol 2.5 MICROgram(s) (STIOLTO) Inhaler 2 Puff(s) Inhalation daily  trimethoprim  160 mG/sulfamethoxazole 800 mG 1 Tablet(s) Oral two times a day    MEDICATIONS  (PRN):  acetaminophen     Tablet .. 650 milliGRAM(s) Oral every 6 hours PRN Temp greater or equal to 38C (100.4F)  HYDROmorphone  Injectable 1 milliGRAM(s) IV Push daily PRN Severe Pain (7 - 10)  oxyCODONE    IR 2.5 milliGRAM(s) Oral every 4 hours PRN Moderate Pain (4 - 6)  sodium chloride 0.65% Nasal 1 Spray(s) Both Nostrils daily PRN Nasal Congestion  traMADol 50 milliGRAM(s) Oral every 4 hours PRN Mild Pain (1 - 3)      Allergies    fish (Hives)  ertapenem (Blisters; Rash)    Intolerances        REVIEW OF SYSTEMS:  CONSTITUTIONAL: No fever, weight loss, or fatigue  EYES: No eye pain, visual disturbances, or discharge  ENMT:  No difficulty hearing, tinnitus, vertigo; No sinus or throat pain  NECK: No pain or stiffness  RESPIRATORY: No cough, wheezing, chills or hemoptysis; No shortness of breath  CARDIOVASCULAR: No chest pain, palpitations, dizziness, or leg swelling  GASTROINTESTINAL: No abdominal or epigastric pain. No nausea, vomiting, or hematemesis; No diarrhea or constipation. No melena or hematochezia.  GENITOURINARY: No dysuria, frequency, hematuria, or incontinence  NEUROLOGICAL: No headaches, memory loss, loss of strength, numbness, or tremors  SKIN: No itching, burning, rashes, or lesions   LYMPH NODES: No enlarged glands  ENDOCRINE: No heat or cold intolerance; No hair loss; No polydipsia or polyuria  MUSCULOSKELETAL: No joint pain or swelling; No muscle, back, or extremity pain  PSYCHIATRIC: No depression, anxiety, mood swings, or difficulty sleeping  HEME/LYMPH: No easy bruising, or bleeding gums  ALLERGY AND IMMUNOLOGIC: No hives or eczema    Vital Signs Last 24 Hrs  T(C): 36.8 (16 Sep 2024 12:26), Max: 36.8 (16 Sep 2024 12:26)  T(F): 98.2 (16 Sep 2024 12:26), Max: 98.2 (16 Sep 2024 12:26)  HR: 92 (16 Sep 2024 12:26) (66 - 92)  BP: 124/76 (16 Sep 2024 12:26) (100/65 - 124/76)  BP(mean): --  RR: 18 (16 Sep 2024 04:29) (18 - 18)  SpO2: 92% (16 Sep 2024 12:26) (92% - 96%)    Parameters below as of 16 Sep 2024 12:26  Patient On (Oxygen Delivery Method): room air        PHYSICAL EXAM:  GENERAL: NAD, well-groomed, well-developed  HEAD:  Atraumatic, Normocephalic  EYES: EOMI, PERRLA, conjunctiva and sclera clear  ENMT: No tonsillar erythema, exudates, or enlargement; Moist mucous membranes, Good dentition, No lesions  NECK: Supple, No JVD, Normal thyroid  NERVOUS SYSTEM:  Alert & Oriented X3, Good concentration; Motor Strength 5/5 B/L upper and lower extremities; DTRs 2+ intact and symmetric  CHEST/LUNG: Clear to auscultation bilaterally; No rales, rhonchi, wheezing, or rubs  HEART: Regular rate and rhythm; No murmurs, rubs, or gallops  ABDOMEN: Soft, Nontender, Nondistended; Bowel sounds present  EXTREMITIES:  right foot in surgical   LYMPH: No lymphadenopathy noted  SKIN: No rashes or lesions    LABS:      Ca    8.0        15 Sep 2024 11:59        Urinalysis Basic - ( 15 Sep 2024 11:59 )    Color: x / Appearance: x / SG: x / pH: x  Gluc: 146 mg/dL / Ketone: x  / Bili: x / Urobili: x   Blood: x / Protein: x / Nitrite: x   Leuk Esterase: x / RBC: x / WBC x   Sq Epi: x / Non Sq Epi: x / Bacteria: x      CAPILLARY BLOOD GLUCOSE      POCT Blood Glucose.: 161 mg/dL (16 Sep 2024 12:26)  POCT Blood Glucose.: 93 mg/dL (16 Sep 2024 08:41)  POCT Blood Glucose.: 85 mg/dL (15 Sep 2024 22:13)  POCT Blood Glucose.: 105 mg/dL (15 Sep 2024 17:00)      RADIOLOGY & ADDITIONAL TESTS:    Notes Reviewed:  [x ] YES  [ ] NO    Care Discussed with Consultants/Other Providers [x ] YES  [ ] NO

## 2024-09-16 NOTE — PROGRESS NOTE ADULT - ASSESSMENT
55 YO M with past medical history of AFib (on Eliquis), s/p cardiac arrest x 2 w/ AHRF (on recent admission), indwelling Aguilera, cerebral aneurysm, CAD (s/p stents), CVA, T2DM, HTN, OM (s/p debridement), PE, perforated gastric ulcer s/p ometnopexy 2019 with Dr. Mejia who presents with possible gangrenous right foot. In ED pt found to be in afib with RVR and hypotension.    Afib with RVR, CAD, HTN   - Likely, in the setting of septic shock from gangrenous foot.   - S/p Cardizem gtt and Dig load.  Amiodarone PO load (5Gm)  - Continue  amio 200 mg PO qd  - Continue digoxin 125 pO QD.  Dig level = 1.1 (9/9)  - Continue metoprolol 12.5 QID. Plan to switch to long-acting  - Eliquis on hold, now on Lovenox, plan to switch to hep for possible angio either Monday or Tuesday per Vascular notes  - Continue to trend and transfuse as needed to keep Hgb ~8    - TTE: normal LVF with mod-severe MR,  increased LV mass and concentric hypertrophy, severely dilated LA and RA,  borderline pulmonary hypertension.  - No evidence of volume overload    - BP stable and controlled  - Continue Midodrine 15mg TID for bp support    - With known Hx of CAD.    - EKG nonischemic  - No evidence of any active ischemia   - Continue ASA and statin.      - S/P partial calcanectomy of right foot, 9/10, pending path, Podia following   - Abx per ID  - ? plan for angiogram with vascular,  timing TBD  - Pt has no active ischemia, decompensated heart failure, unstable arrythmia, or severe stenotic valvular disease. Patient is optimized from cardiovascular standpoint to proceed with planned procedure with routine hemodynamic monitoring.     - Monitor and replete lytes, keep K>4, Mg>2.  - Will continue to follow.    Danny Arce, MS FNP, AGACNP  Nurse Practitioner- Cardiology   Please call on TEAMS

## 2024-09-16 NOTE — PROGRESS NOTE ADULT - SUBJECTIVE AND OBJECTIVE BOX
PROGRESS NOTE   Patient is a 54y old  Male who presents with a chief complaint of AF (16 Sep 2024 11:35)      HPI:  55yo male bib ems from wound care with hypotension and poss gangrenous foot, being treated after having amputations, pt denies fever, chills, has no pain  In ER patient was found to be in hypotension and rapid AF.  patient is being admitted for further work up and treatment.   (05 Sep 2024 17:06)      Vital Signs Last 24 Hrs  T(C): 36.8 (16 Sep 2024 12:26), Max: 36.8 (16 Sep 2024 12:26)  T(F): 98.2 (16 Sep 2024 12:26), Max: 98.2 (16 Sep 2024 12:26)  HR: 92 (16 Sep 2024 12:26) (66 - 92)  BP: 124/76 (16 Sep 2024 12:26) (100/65 - 124/76)  BP(mean): --  RR: 18 (16 Sep 2024 04:29) (18 - 18)  SpO2: 92% (16 Sep 2024 12:26) (92% - 96%)    Parameters below as of 16 Sep 2024 12:26  Patient On (Oxygen Delivery Method): room air                              8.7    9.33  )-----------( 304      ( 15 Sep 2024 11:59 )             28.7               09-15    137  |  108  |  10  ----------------------------<  146<H>  4.2   |  21<L>  |  0.77    Ca    8.0<L>      15 Sep 2024 11:59    TPro  4.9<L>  /  Alb  1.5<L>  /  TBili  0.3  /  DBili  x   /  AST  21  /  ALT  22  /  AlkPhos  92  09-15      PHYSICAL EXAM  Vasc: DP PT pulses non-palpable b/l.  Neuro: Diminished protective sensation b/l  MSK: Healed TMA noted to left foot. s/p right partial calcanectomy with wound debridement   Dermatological: Surgical site of right foot noted to have mild winston-incision erythema, no proximal streaking, no fluctuance, no malodor, no sanguinous oozing at this time     Surgical pathology report:  Final Diagnosis  Right foot calcaneus bone:  -   Portion of bone with acute and chronic osteomyelitis.

## 2024-09-16 NOTE — PROGRESS NOTE ADULT - ASSESSMENT
54m with dm, cad, pad, dvt atrial fib, hypertension, stroke, indwelling jacques, PE, indwelling jacques presents from wound care with  infected right heel ulcer  r/o osteomyelitis  leukocytosis   hypotension, atrial fib    Right Heel Osteomyelitis  Culture - Wound Aerobic/Anaerobic (09.05.24 @ 12:55):    Organism: Escherichia coli ESBL   Organism: Citrobacter freundii   Organism: Providencia stuartii  noted severe ertapenem reaction  surgery, 09/10/2024  right partial calcanectomy,  pt reports having metal stents that prevent MRI  Culture - Tissue with Gram Stain (09.10.24 @ 12:30)  Moderate Staphylococcus aureus-MRSA, Escherichia coli ESBL and mixed microbes all sensitive to Bactrim  PATH Portion of bone with acute and chronic osteomyelitis.  aztreonam--> changed to Bactrim 9/9-continue for now with recommended full dose x 6 weeks so until 10/21 then potential for chronic suppression with daily Bactrim SS    Thank you for consulting us and involving us in the management of this most interesting and challenging case.  We will follow along in the care of this patient. Please call us at [phone] or text me directly on my cell# at [phone] with any concerns.

## 2024-09-16 NOTE — CASE MANAGEMENT PROGRESS NOTE - NSCMPROGRESSNOTE_GEN_ALL_CORE
Discussed pt in rounds, Remains on Po antibiotic. S/P debridement, per vascular note Needs Angio today or tomorrow.

## 2024-09-16 NOTE — PROGRESS NOTE ADULT - SUBJECTIVE AND OBJECTIVE BOX
[facility] DIV[facility] of INFECTIOUS DISEASE  Juventino Whitten MD PhD, Symone Hickey MD, Anne-Marie Li MD, Jeffery Jacobs MD, Ryan Romeo MD  and providing coverage with Melody Armstrong MD  Providing Infectious Disease Consultations at [facility], [facility], [facility]'s    Office# [phone] to schedule follow up appointments  Answering Service for urgent calls or New Consults [phone]  Cell# to text for urgent issues Juventino Whitten [phone]     infectious diseases progress note:    SARAH MORRISON is a 54y y. o. Male patient    Overnight and events of the last 24hrs reviewed    Allergies    fish (Hives)  ertapenem (Blisters; Rash)    Intolerances        ANTIBIOTICS/RELEVANT:  antimicrobials  trimethoprim  160 mG/sulfamethoxazole 800 mG 1 Tablet(s) Oral two times a day    immunologic:    OTHER:  acetaminophen     Tablet .. 650 milliGRAM(s) Oral every 6 hours PRN  aMIOdarone    Tablet 200 milliGRAM(s) Oral daily  aMIOdarone    Tablet   Oral   ascorbic acid 500 milliGRAM(s) Oral daily  aspirin enteric coated 81 milliGRAM(s) Oral daily  atorvastatin 40 milliGRAM(s) Oral at bedtime  cetirizine 10 milliGRAM(s) Oral daily  cholecalciferol 1000 Unit(s) Oral daily  dextrose 5%. 1000 milliLiter(s) IV Continuous <Continuous>  dextrose 5%. 1000 milliLiter(s) IV Continuous <Continuous>  dextrose 50% Injectable 25 Gram(s) IV Push once  dextrose 50% Injectable 25 Gram(s) IV Push once  dextrose 50% Injectable 12.5 Gram(s) IV Push once  dextrose Oral Gel 15 Gram(s) Oral once  digoxin     Tablet 125 MICROGram(s) Oral daily  enoxaparin Injectable 100 milliGRAM(s) SubCutaneous every 12 hours  famotidine    Tablet 20 milliGRAM(s) Oral daily  ferrous    sulfate 325 milliGRAM(s) Oral two times a day  gabapentin 100 milliGRAM(s) Oral three times a day  glucagon  Injectable 1 milliGRAM(s) IntraMuscular once  HYDROmorphone  Injectable 1 milliGRAM(s) IV Push daily PRN  insulin lispro (ADMELOG) corrective regimen sliding scale   SubCutaneous at bedtime  insulin lispro (ADMELOG) corrective regimen sliding scale   SubCutaneous three times a day before meals  melatonin 5 milliGRAM(s) Oral at bedtime  metoprolol tartrate 12.5 milliGRAM(s) Oral every 6 hours  midodrine. 15 milliGRAM(s) Oral three times a day  multivitamin 1 Tablet(s) Oral daily  mupirocin 2% Nasal 1 Application(s) Both Nostrils two times a day  naloxegol 25 milliGRAM(s) Oral daily  oxyCODONE    IR 2.5 milliGRAM(s) Oral every 4 hours PRN  oxyCODONE  ER Tablet 10 milliGRAM(s) Oral every 12 hours  pantoprazole    Tablet 40 milliGRAM(s) Oral before breakfast  polyethylene glycol 3350 17 Gram(s) Oral daily  senna 2 Tablet(s) Oral at bedtime  sodium chloride 0.65% Nasal 1 Spray(s) Both Nostrils daily PRN  sucralfate 1 Gram(s) Oral two times a day  tiotropium 2.5 MICROgram(s)/olodaterol 2.5 MICROgram(s) (STIOLTO) Inhaler 2 Puff(s) Inhalation daily  traMADol 50 milliGRAM(s) Oral every 4 hours PRN      Objective:  Vital Signs Last 24 Hrs  T(C): 36.5 (16 Sep 2024 04:29), Max: 36.5 (15 Sep 2024 19:16)  T(F): 97.7 (16 Sep 2024 04:29), Max: 97.7 (15 Sep 2024 19:16)  HR: 78 (16 Sep 2024 04:29) (66 - 94)  BP: 100/65 (16 Sep 2024 04:29) (100/65 - 124/72)  BP(mean): --  RR: 18 (16 Sep 2024 04:29) (18 - 18)  SpO2: 94% (16 Sep 2024 04:29) (93% - 96%)    Parameters below as of 16 Sep 2024 04:29  Patient On (Oxygen Delivery Method): room air        T(C): 36.5 (09-16-24 @ 04:29), Max: 37.1 (09-14-24 @ 11:15)  T(C): 36.5 (09-16-24 @ 04:29), Max: 37.1 (09-13-24 @ 21:15)  T(C): 36.5 (09-16-24 @ 04:29), Max: 37.1 (09-13-24 @ 21:15)    PHYSICAL EXAM:  HEENT: NC atraumatic  Neck: supple  Respiratory: no accessory muscle use, breathing comfortably  Cardiovascular: distant  Gastrointestinal: normal appearing, nondistended  Extremities: no clubbing, no cyanosis,        LABS:                          8.7    9.33  )-----------( 304      ( 15 Sep 2024 11:59 )             28.7       WBC  9.33 09-15 @ 11:59  9.13 09-14 @ 09:50  11.32 09-13 @ 10:18  10.01 09-12 @ 06:10      09-15    137  |  108  |  10  ----------------------------<  146<H>  4.2   |  21<L>  |  0.77    Ca    8.0<L>      15 Sep 2024 11:59    TPro  4.9<L>  /  Alb  1.5<L>  /  TBili  0.3  /  DBili  x   /  AST  21  /  ALT  22  /  AlkPhos  92  09-15      Creatinine: 0.77 mg/dL (09-15-24 @ 11:59)  Creatinine: 0.86 mg/dL (09-13-24 @ 10:18)  Creatinine: 1.10 mg/dL (09-12-24 @ 06:10)        Urinalysis Basic - ( 15 Sep 2024 11:59 )    Color: x / Appearance: x / SG: x / pH: x  Gluc: 146 mg/dL / Ketone: x  / Bili: x / Urobili: x   Blood: x / Protein: x / Nitrite: x   Leuk Esterase: x / RBC: x / WBC x   Sq Epi: x / Non Sq Epi: x / Bacteria: x            INFLAMMATORY MARKERS      MICROBIOLOGY:    Culture - Tissue with Gram Stain (09.10.24 @ 12:30)    -  Linezolid: S 2   -  Meropenem: S <=1   -  Oxacillin: R >2   -  Penicillin: R >8   -  Piperacillin/Tazobactam: R <=8   -  Rifampin: S <=1 Should not be used as monotherapy   -  Tetracycline: R >8   -  Tobramycin: S <=2   -  Trimethoprim/Sulfamethoxazole: S <=0.5/9.5   -  Trimethoprim/Sulfamethoxazole: S <=0.5/9.5   -  Vancomycin: S 1   Gram Stain:   No polymorphonuclear leukocytes seen per low power field  Numerous Gram Variable Rods seen per oil power field  Moderate Gram positive cocci in pairs seen per oil power field   -  Ampicillin: R >16 These ampicillin results predict results for amoxicillin   -  Ampicillin/Sulbactam: R 8/4   -  Aztreonam: R >16   -  Cefazolin: R >16   -  Cefepime: R >16   -  Ceftriaxone: R >32   -  Ciprofloxacin: R >2   -  Clindamycin: S <=0.25   -  Daptomycin: S <=0.25   -  Ertapenem: S <=0.5   -  Erythromycin: S <=0.25   -  Gentamicin: R >8 Should not be used as monotherapy   -  Gentamicin: S <=2   -  Imipenem: S <=1   -  Levofloxacin: R >4   Specimen Source: Tissue Other, RIGHT FOOT SOFT TISSUE CULTURE   Culture Results:   Culture yields >4 types of aerobic and/or anaerobic bacteria  Call client services within 7 days if further workup is clinically  indicated.  Culture includes  Moderate Methicillin Resistant Staphylococcus aureus  Numerous Escherichia coli ESBL   Organism Identification: Escherichia coli ESBL  Methicillin resistant Staphylococcus aureus   Organism: Escherichia coli ESBL   Organism: Methicillin resistant Staphylococcus aureus   Method Type: BAYLEE   Method Type: BAYLEE        RADIOLOGY & ADDITIONAL STUDIES:  Surgical Pathology Report (09.10.24 @ 12:30)    Surgical Pathology Report:   ACCESSION No:  [id number]  Patient:     SARAH MORRISON      Accession:                             [id number]    Collected Date/Time:                   9/10/2024 12:30 EDT  Received Date/Time:                    9/11/2024 06:28 EDT    Surgical Pathology Report - Auth (Verified)    Specimen(s) Submitted  1  Right foot calcaneus bone pathology    Final Diagnosis  Right foot calcaneus bone:  -   Portion of bone with acute and chronic osteomyelitis.    Verified by: Amira Villeda MD  (Electronic Signature)  Reported on: 09/13/24 16:41 EDT, [facility], [address] [place] [address]  _________________________________________________________________    Clinical Information  Right heel infected diabetic ulcer down to bone rule out osteomyelitis  Procedure: Sharp excisional wound debridement with bone biopsy, right heel      Perioperative Diagnosis  Right heel infected diabetic ulcer down to bone    Postoperative Diagnosis  Same    Gross Description  Received:  In formalin labeled  "right foot calcaneus bone"  Size:  One fragment measuring 4.2 x 3.5 x 0.7 cm  Description:  An oval in shape fragment of tan bony tissue with smooth  resection margin which is inked orange  Additional Comments:   Single section is submitted in each cassette    Submitted:  Representative sections following fixation and acid  decalcification in four cassettes: 1A-1D    MELISSA Fernandez 09/11/2024 09:36 AM    Disclaimer  In addition to other data that may appear on the specimen containers, all  labels have been inspected to confirm the presence of the patient's name  and date of birth.      Histological processing performed at [facility], Department of  Pathology, [address] [place] [address].    For slide(s) received with immunohistochemical study control(s), the  control result(s) has/have been verified by the sign out pathologist.  For slide(s) without received controls positive control slides has/have  been reviewed and approved by performing immunohistochemistry lab*.*    For those cases signed out at [facility], [address] [place] [address]: The immunohistochemical/in-situ hybridization tests  have been developed and their performance characteristics determined by  [facility] [address]. They have  not been cleared or approved by the U.S. Food and Drug Administration.  The FDA has  determined that such clearance or approval is not necessary.  This test is used for clinical purposes.The laboratory is certified  under the CLIA-88 as qualified to perform high complexity clinical  testing.    For those cases signed out at [address] [place] [address]:  These immunohistochemical/ in-situ hybridization tests have been  developed and their performance characteristics determined by [facility] /  [facility], Department of Pathology, Division of  Immunopathology, [address] [place] [address]. They have  not been cleared or approved by the U.S. Food and Drug Administration.  The FDA has determined that such clearance or approval is not necessary.  This test is used for clinical purposes. The laboratory is certified  under the CLIA-88 as qualified to perform high complexity clinical  testing.    For tests performed at [facility]: [address] [place] [address].      For tests performed at [facility] [address], [address] [place] [address]. For tests performed at [facility]: [address], [place] [address]. For tests  performed at [facility] [address] [place]  [address]. For tests performed at [facility] [address], [address]  [place] [address]. For tests performed at [facility]: [address] [place] [address]. For tests performed at [facility]: [address], [place] [address]. For tests performed at  [facility]: [facility] [place]  [address].

## 2024-09-16 NOTE — PROGRESS NOTE ADULT - PROBLEM SELECTOR PLAN 1
Patient evaluated and chart reviewed.   s/p right foot partial calcanectomy and soft tissue debridement.  No strikethrough bleeding noted to bandages today.  Reapplied dry sterile dressing  Surgical pathology report reviewed  Continue abx per ID recs.   Patient will continue to be monitored closely by wound care  Plan to be discussed with attending.  Wound Care Instructions:  -Keep dressing clean, dry, and intact to the affected area  -Apply adaptic, gauze, abd, paul, change every other day   -Monitor for any signs of infection including redness, swelling in the leg above the bandage, nausea/vomiting/fever/chills/chest pain/shortness of breath, if any are present patient must report to the emergency department immediately  -Patient is to follow up with Dr. Hale/Dr. Todd/ Dr. Jewell  within 5 days after discharge at [facility]. Please call to make an appointment [phone]. Patient evaluated and chart reviewed.   s/p right foot partial calcanectomy and soft tissue debridement.  No strikethrough bleeding noted to bandages today.  Reapplied dry sterile dressing  Surgical pathology report reviewed  Continue abx per ID recs.   Patient will continue to be monitored closely by wound care  Plan to be discussed with attending.

## 2024-09-16 NOTE — PROGRESS NOTE ADULT - SUBJECTIVE AND OBJECTIVE BOX
[facility] -- Brittany Lutz, Reynaldo Sweeney Savella, Goodger, Cohen: Office # [phone]    Follow Up:  Cardiac clearance     Subjective/Observations: Patient awake, alert, resting in bed. Denies chest pain, palpitations and dizziness. Denies any difficulty breathing. No orthopnea and PND. Tolerating room air.     REVIEW OF SYSTEMS: All other review of systems are negative unless indicated above    PAST MEDICAL & SURGICAL HISTORY:  Diabetes      Diabetes mellitus with no complication      Afib      Hypertension      Hypertension      BPH (benign prostatic hyperplasia)      Perforated gastric ulcer  s/p emergent ex-lap omentopexy and plication 6/2019      Pulmonary embolism      History of non-ST elevation myocardial infarction (NSTEMI)      Osteomyelitis  s/p debridement      CAD S/P percutaneous coronary angioplasty      Cerebrovascular accident      H/O abdominal surgery      Perforated gastric ulcer      Traumatic amputation of left foot, initial encounter          MEDICATIONS  (STANDING):  aMIOdarone    Tablet 200 milliGRAM(s) Oral daily  aMIOdarone    Tablet   Oral   ascorbic acid 500 milliGRAM(s) Oral daily  aspirin enteric coated 81 milliGRAM(s) Oral daily  atorvastatin 40 milliGRAM(s) Oral at bedtime  cetirizine 10 milliGRAM(s) Oral daily  cholecalciferol 1000 Unit(s) Oral daily  dextrose 5%. 1000 milliLiter(s) (100 mL/Hr) IV Continuous <Continuous>  dextrose 5%. 1000 milliLiter(s) (50 mL/Hr) IV Continuous <Continuous>  dextrose 50% Injectable 12.5 Gram(s) IV Push once  dextrose 50% Injectable 25 Gram(s) IV Push once  dextrose 50% Injectable 25 Gram(s) IV Push once  dextrose Oral Gel 15 Gram(s) Oral once  digoxin     Tablet 125 MICROGram(s) Oral daily  enoxaparin Injectable 100 milliGRAM(s) SubCutaneous every 12 hours  famotidine    Tablet 20 milliGRAM(s) Oral daily  ferrous    sulfate 325 milliGRAM(s) Oral two times a day  gabapentin 100 milliGRAM(s) Oral three times a day  glucagon  Injectable 1 milliGRAM(s) IntraMuscular once  insulin lispro (ADMELOG) corrective regimen sliding scale   SubCutaneous three times a day before meals  insulin lispro (ADMELOG) corrective regimen sliding scale   SubCutaneous at bedtime  melatonin 5 milliGRAM(s) Oral at bedtime  metoprolol tartrate 12.5 milliGRAM(s) Oral every 6 hours  midodrine. 15 milliGRAM(s) Oral three times a day  multivitamin 1 Tablet(s) Oral daily  mupirocin 2% Nasal 1 Application(s) Both Nostrils two times a day  naloxegol 25 milliGRAM(s) Oral daily  oxyCODONE  ER Tablet 10 milliGRAM(s) Oral every 12 hours  pantoprazole    Tablet 40 milliGRAM(s) Oral before breakfast  polyethylene glycol 3350 17 Gram(s) Oral daily  senna 2 Tablet(s) Oral at bedtime  sucralfate 1 Gram(s) Oral two times a day  tiotropium 2.5 MICROgram(s)/olodaterol 2.5 MICROgram(s) (STIOLTO) Inhaler 2 Puff(s) Inhalation daily  trimethoprim  160 mG/sulfamethoxazole 800 mG 1 Tablet(s) Oral two times a day    MEDICATIONS  (PRN):  acetaminophen     Tablet .. 650 milliGRAM(s) Oral every 6 hours PRN Temp greater or equal to 38C (100.4F)  HYDROmorphone  Injectable 1 milliGRAM(s) IV Push daily PRN Severe Pain (7 - 10)  oxyCODONE    IR 2.5 milliGRAM(s) Oral every 4 hours PRN Moderate Pain (4 - 6)  sodium chloride 0.65% Nasal 1 Spray(s) Both Nostrils daily PRN Nasal Congestion  traMADol 50 milliGRAM(s) Oral every 4 hours PRN Mild Pain (1 - 3)    Allergies    fish (Hives)  ertapenem (Blisters; Rash)    Intolerances      Vital Signs Last 24 Hrs  T(C): 36.5 (16 Sep 2024 04:29), Max: 36.5 (15 Sep 2024 19:16)  T(F): 97.7 (16 Sep 2024 04:29), Max: 97.7 (15 Sep 2024 19:16)  HR: 78 (16 Sep 2024 04:29) (66 - 78)  BP: 100/65 (16 Sep 2024 04:29) (100/65 - 124/72)  BP(mean): --  RR: 18 (16 Sep 2024 04:29) (18 - 18)  SpO2: 94% (16 Sep 2024 04:29) (94% - 96%)    Parameters below as of 16 Sep 2024 04:29  Patient On (Oxygen Delivery Method): room air      I&O's Summary    15 Sep 2024 07:01  -  16 Sep 2024 07:00  --------------------------------------------------------  IN: 0 mL / OUT: 2800 mL / NET: -2800 mL          TELE: Not on telemetry   PHYSICAL EXAM:  Constitutional: NAD, awake and alert  HEENT: Moist Mucous Membranes, Anicteric  Pulmonary: Non-labored, breath sounds are clear bilaterally, No wheezing, rales or rhonchi  Cardiovascular: Regular, S1 and S2, + murmurs, No rubs, gallops or clicks  Gastrointestinal:  soft, nontender, nondistended   Lymph: +1-2 peripheral edema. No lymphadenopathy.   Skin: No visible rashes Right foot DSD intact.   Psych:  Mood & affect appropriate      LABS: All Labs Reviewed:                        8.7    9.33  )-----------( 304      ( 15 Sep 2024 11:59 )             28.7                         8.6    9.13  )-----------( 398      ( 14 Sep 2024 09:50 )             28.2                         8.7    x     )-----------( x        ( 13 Sep 2024 16:05 )             27.6     15 Sep 2024 11:59    137    |  108    |  10     ----------------------------<  146    4.2     |  21     |  0.77     Ca    8.0        15 Sep 2024 11:59    TPro  4.9    /  Alb  1.5    /  TBili  0.3    /  DBili  x      /  AST  21     /  ALT  22     /  AlkPhos  92     15 Sep 2024 11:59   LIVER FUNCTIONS - ( 15 Sep 2024 11:59 )  Alb: 1.5 g/dL / Pro: 4.9 g/dL / ALK PHOS: 92 U/L / ALT: 22 U/L / AST: 21 U/L / GGT: x                 TRANSTHORACIC ECHOCARDIOGRAM REPORT  ________________________________________________________________________________                                      _______       Pt. Name:       SARAH MORRISON Study Date:    9/5/2024  MRN:            [id number]         YOB: 1970  Accession #:    [id number]        Age:           54 years  Account#:       [id number]       Gender:        M  Heart Rate:                      Height:        74.02 in (188.00 cm)  Rhythm:                          Weight:        207.23 lb (94.00 kg)  Blood Pressure: 97/60 mmHg       BSA/BMI:       2.21 m² / 26.60 kg/m²  ________________________________________________________________________________________  Referring Physician:    [id number] Hill Brizuela  Interpreting Physician: Claudette Jacobs  Primary Sonographer:    Mounika FELDMAN    CPT:               ECHO TTE WO CON COMP W DOPP - 13079.m  Indication(s):     Abnormal electrocardiogram ECG/EKG - R94.31  Procedure:         Transthoracic echocardiogram with2-D, M-mode and complete                     spectral and color flow Doppler.  Ordering Location: [facility]  Admission Status:  Inpatient  Study Information: Image quality for this study is technically difficult.    _______________________________________________________________________________________     CONCLUSIONS:      1. Technically difficult image quality.   2. Left ventricular cavity is normal in size. Left ventricular wall thickness is normal. Left ventricular systolic function is normal withan ejection fraction of 69 % by Csatañeda's method of disks. There are no regional wall motion abnormalities seen.   3. There is increased LV mass and concentric hypertrophy.   4. Normal right ventricular cavity size and normal right ventricular systolic function.   5. Left atrium is severely dilated.   6. The right atrium is severely dilated.   7. There is calcification of the mitral valve annulus.   8. Mitral valve leaflets are diffusely calcified.   9. Moderate to severe mitral regurgitation.  10. Trileaflet aortic valve with normal systolic excursion. There is calcification of the aortic valve leaflets.  11. Mild tricuspid regurgitation.  12. Estimated pulmonary artery systolic pressure is 39 mmHg, consistent with borderline pulmonary hypertension.  13. The inferior vena cava is normal in size measuring 1.75 cm in diameter, (normal <2.1cm) with normal inspiratory collapse (normal >50%) consistent with normal right atrial pressure (~3, range 0-5mmHg).    ________________________________________________________________________________________  FINDINGS:     Left Ventricle:  The left ventricular cavity is normal in size. Left ventricular wall thickness is normal. Left ventricular systolic function is normal with a calculated ejection fraction of 69 % by the Castañeda's biplane method of disks. There are no regional wall motion abnormalities seen. There is increased LV mass and concentric hypertrophy. The left ventricular diastolic function is indeterminate.     Right Ventricle:  The right ventricular cavity is normal in size and right ventricular systolic function is normal.     Left Atrium:  The left atrium is severely dilated with an indexed volume of 31.95 ml/m².     Right Atrium:  The right atrium is severely dilated with an indexed volume of 26.74 ml/m².     Aortic Valve:  The aortic valve appears trileaflet with normal systolic excursion. There is calcification of the aortic valve leaflets.     Mitral Valve:  There is calcification of the mitral valve annulus. Mitral valve leaflets are diffusely calcified. There is moderate to severe mitral regurgitation.     Tricuspid Valve:  Structurally normal tricuspid valve with normal leaflet excursion. There is mild tricuspid regurgitation. Estimated pulmonary artery systolicpressure is 39 mmHg, consistent with borderline pulmonary hypertension.     Pulmonic Valve:  The pulmonic valve was not well visualized. There is trace pulmonic regurgitation.     Systemic Veins:  The inferior vena cava is normal in size measuring 1.75 cm in diameter, (normal <2.1cm) with normal inspiratory collapse (normal >50%) consistent with normal right atrial pressure (~3, range 0-5mmHg).  ____________________________________________________________________  QUANTITATIVE DATA:  Left Ventricle Measurements: (Indexed to BSA)     IVSd (2D):   1.4 cm  LVPWd (2D):  1.3 cm  LVIDd (2D):  5.4 cm  LVIDs (2D):  4.0 cm  LV Mass:     306 g  138.7 g/m²  LV Vol d, MOD A2C: 87.4 ml 39.62 ml/m²  LV Vol d, MOD A4C: 81.2 ml 36.78 ml/m²  LV Vol d, MOD BP: 92.3 ml 41.82 ml/m²  LV Vol s, MOD A2C: 29.0 ml 13.13 ml/m²  LV Vol s, MOD A4C: 28.3 ml 12.82 ml/m²  LV Vol s, MOD BP:  28.9 ml 13.08 ml/m²  LVOT SV MOD BP:    63.4 ml  LV EF% MOD BP:     69 %     MV E Vmax:    1.19 m/s  e' lateral:   13.10 cm/s  e'medial:    10.70 cm/s  E/e' lateral: 9.05  E/e' medial:  12.00  E/e' Average: 9.96  MV DT:        148 msec    Aorta Measurements: (Normal range) (Indexed to BSA)     Ao Root d 3.23 cm (3.1 - 3.7 cm) 1.46 cm/m²            Left Atrium Measurements: (Indexed to BSA)  LA Diam 2D: 4.72 cm         Right Atrial Measurements:     RA Vol:       59.00 ml  RA Vol Index: 26.74 ml/m²       LVOT / RVOT/ Qp/Qs Data: (Indexed to BSA)  LVOT Diameter: 2.29 cm  LVOT Area:     4.12 cm²    Mitral Valve Measurements:     MV E Vmax: 1.2 m/s       Tricuspid Valve Measurements:     TR Vmax:          3.0 m/s  TR Peak Gradient: 35.9 mmHg  RA Pressure:      3 mmHg  PASP:             39 mmHg    ________________________________________________________________________________________  Electronically signed on 9/6/2024 at 10:38:59 AM by Claudette Jacobs         *** Final ***

## 2024-09-17 LAB
APTT BLD: 167.3 SEC — CRITICAL HIGH (ref 24.5–35.6)
APTT BLD: 45.2 SEC — HIGH (ref 24.5–35.6)
APTT BLD: 53.6 SEC — HIGH (ref 24.5–35.6)
CULTURE RESULTS: ABNORMAL
GLUCOSE BLDC GLUCOMTR-MCNC: 132 MG/DL — HIGH (ref 70–99)
GLUCOSE BLDC GLUCOMTR-MCNC: 148 MG/DL — HIGH (ref 70–99)
GLUCOSE BLDC GLUCOMTR-MCNC: 179 MG/DL — HIGH (ref 70–99)
GLUCOSE BLDC GLUCOMTR-MCNC: 187 MG/DL — HIGH (ref 70–99)
HCT VFR BLD CALC: 27.4 % — LOW (ref 39–50)
HCT VFR BLD CALC: 28.7 % — LOW (ref 39–50)
HGB BLD-MCNC: 8.8 G/DL — LOW (ref 13–17)
HGB BLD-MCNC: 9.3 G/DL — LOW (ref 13–17)
MCHC RBC-ENTMCNC: 30.3 PG — SIGNIFICANT CHANGE UP (ref 27–34)
MCHC RBC-ENTMCNC: 31.1 PG — SIGNIFICANT CHANGE UP (ref 27–34)
MCHC RBC-ENTMCNC: 32.1 GM/DL — SIGNIFICANT CHANGE UP (ref 32–36)
MCHC RBC-ENTMCNC: 32.4 GM/DL — SIGNIFICANT CHANGE UP (ref 32–36)
MCV RBC AUTO: 94.5 FL — SIGNIFICANT CHANGE UP (ref 80–100)
MCV RBC AUTO: 96 FL — SIGNIFICANT CHANGE UP (ref 80–100)
NRBC # BLD: 0 /100 WBCS — SIGNIFICANT CHANGE UP (ref 0–0)
NRBC # BLD: 0 /100 WBCS — SIGNIFICANT CHANGE UP (ref 0–0)
ORGANISM # SPEC MICROSCOPIC CNT: ABNORMAL
ORGANISM # SPEC MICROSCOPIC CNT: ABNORMAL
ORGANISM # SPEC MICROSCOPIC CNT: SIGNIFICANT CHANGE UP
PLATELET # BLD AUTO: 341 K/UL — SIGNIFICANT CHANGE UP (ref 150–400)
PLATELET # BLD AUTO: 362 K/UL — SIGNIFICANT CHANGE UP (ref 150–400)
RBC # BLD: 2.9 M/UL — LOW (ref 4.2–5.8)
RBC # BLD: 2.99 M/UL — LOW (ref 4.2–5.8)
RBC # FLD: 19.9 % — HIGH (ref 10.3–14.5)
RBC # FLD: 20.1 % — HIGH (ref 10.3–14.5)
SPECIMEN SOURCE: SIGNIFICANT CHANGE UP
WBC # BLD: 10.38 K/UL — SIGNIFICANT CHANGE UP (ref 3.8–10.5)
WBC # BLD: 8.98 K/UL — SIGNIFICANT CHANGE UP (ref 3.8–10.5)
WBC # FLD AUTO: 10.38 K/UL — SIGNIFICANT CHANGE UP (ref 3.8–10.5)
WBC # FLD AUTO: 8.98 K/UL — SIGNIFICANT CHANGE UP (ref 3.8–10.5)

## 2024-09-17 PROCEDURE — 99232 SBSQ HOSP IP/OBS MODERATE 35: CPT

## 2024-09-17 RX ORDER — HEPARIN SODIUM,BOVINE 1000/ML
VIAL (ML) INJECTION
Qty: 25000 | Refills: 0 | Status: DISCONTINUED | OUTPATIENT
Start: 2024-09-17 | End: 2024-09-17

## 2024-09-17 RX ORDER — APIXABAN 5 MG/1
5 TABLET, FILM COATED ORAL
Refills: 0 | Status: DISCONTINUED | OUTPATIENT
Start: 2024-09-17 | End: 2024-09-18

## 2024-09-17 RX ADMIN — Medication 81 MILLIGRAM(S): at 12:15

## 2024-09-17 RX ADMIN — DIGOXIN 125 MICROGRAM(S): 0.12 TABLET ORAL at 05:37

## 2024-09-17 RX ADMIN — Medication 100 MILLIGRAM(S): at 23:22

## 2024-09-17 RX ADMIN — Medication 1700 UNIT(S)/HR: at 19:20

## 2024-09-17 RX ADMIN — Medication 0 UNIT(S)/HR: at 15:34

## 2024-09-17 RX ADMIN — Medication 1000 UNIT(S): at 12:15

## 2024-09-17 RX ADMIN — OXYCODONE HYDROCHLORIDE 2.5 MILLIGRAM(S): 5 TABLET ORAL at 00:30

## 2024-09-17 RX ADMIN — METOPROLOL TARTRATE 12.5 MILLIGRAM(S): 100 TABLET ORAL at 05:36

## 2024-09-17 RX ADMIN — Medication 2000 UNIT(S)/HR: at 09:14

## 2024-09-17 RX ADMIN — METOPROLOL TARTRATE 12.5 MILLIGRAM(S): 100 TABLET ORAL at 17:20

## 2024-09-17 RX ADMIN — Medication 10 MILLIGRAM(S): at 12:45

## 2024-09-17 RX ADMIN — Medication 5 MILLIGRAM(S): at 23:23

## 2024-09-17 RX ADMIN — METOPROLOL TARTRATE 12.5 MILLIGRAM(S): 100 TABLET ORAL at 23:23

## 2024-09-17 RX ADMIN — TIOTROPIUM BROMIDE AND OLODATEROL 2 PUFF(S): 3.124; 2.736 SPRAY, METERED RESPIRATORY (INHALATION) at 08:48

## 2024-09-17 RX ADMIN — Medication 325 MILLIGRAM(S): at 05:37

## 2024-09-17 RX ADMIN — Medication 1 TABLET(S): at 12:15

## 2024-09-17 RX ADMIN — Medication 40 MILLIGRAM(S): at 23:22

## 2024-09-17 RX ADMIN — Medication 4000 UNIT(S): at 09:20

## 2024-09-17 RX ADMIN — SULFAMETHOXAZOLE AND TRIMETHOPRIM 1 TABLET(S): 800; 160 TABLET ORAL at 05:36

## 2024-09-17 RX ADMIN — SUCRALFATE 1 GRAM(S): 1 SUSPENSION ORAL at 17:20

## 2024-09-17 RX ADMIN — Medication 100 MILLIGRAM(S): at 12:46

## 2024-09-17 RX ADMIN — MIDODRINE HYDROCHLORIDE 15 MILLIGRAM(S): 5 TABLET ORAL at 12:15

## 2024-09-17 RX ADMIN — Medication 1: at 12:10

## 2024-09-17 RX ADMIN — OXYCODONE HYDROCHLORIDE 10 MILLIGRAM(S): 5 TABLET ORAL at 05:34

## 2024-09-17 RX ADMIN — OXYCODONE HYDROCHLORIDE 200 MILLIGRAM(S): 15 TABLET ORAL at 05:36

## 2024-09-17 RX ADMIN — SULFAMETHOXAZOLE AND TRIMETHOPRIM 1 TABLET(S): 800; 160 TABLET ORAL at 17:20

## 2024-09-17 RX ADMIN — MIDODRINE HYDROCHLORIDE 15 MILLIGRAM(S): 5 TABLET ORAL at 17:21

## 2024-09-17 RX ADMIN — OXYCODONE HYDROCHLORIDE 10 MILLIGRAM(S): 5 TABLET ORAL at 17:21

## 2024-09-17 RX ADMIN — Medication 40 MILLIGRAM(S): at 08:48

## 2024-09-17 RX ADMIN — Medication 1700 UNIT(S)/HR: at 16:41

## 2024-09-17 RX ADMIN — OXYCODONE HYDROCHLORIDE 10 MILLIGRAM(S): 5 TABLET ORAL at 18:21

## 2024-09-17 RX ADMIN — MIDODRINE HYDROCHLORIDE 15 MILLIGRAM(S): 5 TABLET ORAL at 05:36

## 2024-09-17 RX ADMIN — Medication 1: at 08:22

## 2024-09-17 RX ADMIN — METOPROLOL TARTRATE 12.5 MILLIGRAM(S): 100 TABLET ORAL at 12:45

## 2024-09-17 RX ADMIN — Medication 1800 UNIT(S)/HR: at 07:54

## 2024-09-17 RX ADMIN — NALOXEGOL OXALATE 25 MILLIGRAM(S): 25 TABLET, FILM COATED ORAL at 12:45

## 2024-09-17 RX ADMIN — Medication 325 MILLIGRAM(S): at 17:21

## 2024-09-17 RX ADMIN — OXYCODONE HYDROCHLORIDE 10 MILLIGRAM(S): 5 TABLET ORAL at 06:30

## 2024-09-17 RX ADMIN — SUCRALFATE 1 GRAM(S): 1 SUSPENSION ORAL at 05:37

## 2024-09-17 RX ADMIN — HYDROMORPHONE HYDROCHLORIDE 1 MILLIGRAM(S): 2 TABLET ORAL at 23:28

## 2024-09-17 RX ADMIN — Medication 500 MILLIGRAM(S): at 12:15

## 2024-09-17 RX ADMIN — FAMOTIDINE 20 MILLIGRAM(S): 10 INJECTION INTRAVENOUS at 12:14

## 2024-09-17 RX ADMIN — Medication 100 MILLIGRAM(S): at 05:34

## 2024-09-17 NOTE — PROGRESS NOTE ADULT - SUBJECTIVE AND OBJECTIVE BOX
Date of Service 09-17-24 @ 16:15    Patient is a 54y old  Male who presents with a chief complaint of AF (17 Sep 2024 10:09)      INTERVAL /OVERNIGHT EVENTS: waiting for angio    MEDICATIONS  (STANDING):  aMIOdarone    Tablet   Oral   aMIOdarone    Tablet 200 milliGRAM(s) Oral daily  ascorbic acid 500 milliGRAM(s) Oral daily  aspirin enteric coated 81 milliGRAM(s) Oral daily  atorvastatin 40 milliGRAM(s) Oral at bedtime  cetirizine 10 milliGRAM(s) Oral daily  cholecalciferol 1000 Unit(s) Oral daily  dextrose 5%. 1000 milliLiter(s) (100 mL/Hr) IV Continuous <Continuous>  dextrose 5%. 1000 milliLiter(s) (50 mL/Hr) IV Continuous <Continuous>  dextrose 50% Injectable 25 Gram(s) IV Push once  dextrose 50% Injectable 12.5 Gram(s) IV Push once  dextrose 50% Injectable 25 Gram(s) IV Push once  dextrose Oral Gel 15 Gram(s) Oral once  digoxin     Tablet 125 MICROGram(s) Oral daily  famotidine    Tablet 20 milliGRAM(s) Oral daily  ferrous    sulfate 325 milliGRAM(s) Oral two times a day  gabapentin 100 milliGRAM(s) Oral three times a day  glucagon  Injectable 1 milliGRAM(s) IntraMuscular once  heparin  Infusion.  Unit(s)/Hr (18 mL/Hr) IV Continuous <Continuous>  insulin lispro (ADMELOG) corrective regimen sliding scale   SubCutaneous three times a day before meals  insulin lispro (ADMELOG) corrective regimen sliding scale   SubCutaneous at bedtime  melatonin 5 milliGRAM(s) Oral at bedtime  metoprolol tartrate 12.5 milliGRAM(s) Oral every 6 hours  midodrine. 15 milliGRAM(s) Oral three times a day  multivitamin 1 Tablet(s) Oral daily  mupirocin 2% Nasal 1 Application(s) Both Nostrils two times a day  naloxegol 25 milliGRAM(s) Oral daily  oxyCODONE  ER Tablet 10 milliGRAM(s) Oral every 12 hours  pantoprazole    Tablet 40 milliGRAM(s) Oral before breakfast  polyethylene glycol 3350 17 Gram(s) Oral daily  senna 2 Tablet(s) Oral at bedtime  sucralfate 1 Gram(s) Oral two times a day  tiotropium 2.5 MICROgram(s)/olodaterol 2.5 MICROgram(s) (STIOLTO) Inhaler 2 Puff(s) Inhalation daily  trimethoprim  160 mG/sulfamethoxazole 800 mG 1 Tablet(s) Oral two times a day    MEDICATIONS  (PRN):  acetaminophen     Tablet .. 650 milliGRAM(s) Oral every 6 hours PRN Temp greater or equal to 38C (100.4F)  heparin   Injectable 8000 Unit(s) IV Push every 6 hours PRN For aPTT less than 40  heparin   Injectable 4000 Unit(s) IV Push every 6 hours PRN For aPTT between 40 - 57  HYDROmorphone  Injectable 1 milliGRAM(s) IV Push daily PRN Severe Pain (7 - 10)  oxyCODONE    IR 2.5 milliGRAM(s) Oral every 4 hours PRN Moderate Pain (4 - 6)  sodium chloride 0.65% Nasal 1 Spray(s) Both Nostrils daily PRN Nasal Congestion  traMADol 50 milliGRAM(s) Oral every 4 hours PRN Mild Pain (1 - 3)      Allergies    fish (Hives)  ertapenem (Blisters; Rash)    Intolerances        REVIEW OF SYSTEMS:  CONSTITUTIONAL: No fever, weight loss, or fatigue  EYES: No eye pain, visual disturbances, or discharge  ENMT:  No difficulty hearing, tinnitus, vertigo; No sinus or throat pain  NECK: No pain or stiffness  RESPIRATORY: No cough, wheezing, chills or hemoptysis; No shortness of breath  CARDIOVASCULAR: No chest pain, palpitations, dizziness, or leg swelling  GASTROINTESTINAL: No abdominal or epigastric pain. No nausea, vomiting, or hematemesis; No diarrhea or constipation. No melena or hematochezia.  GENITOURINARY: No dysuria, frequency, hematuria, or incontinence  NEUROLOGICAL: No headaches, memory loss, loss of strength, numbness, or tremors  SKIN: No itching, burning, rashes, or lesions   LYMPH NODES: No enlarged glands  ENDOCRINE: No heat or cold intolerance; No hair loss; No polydipsia or polyuria  MUSCULOSKELETAL: No joint pain or swelling; No muscle, back, or extremity pain  PSYCHIATRIC: No depression, anxiety, mood swings, or difficulty sleeping  HEME/LYMPH: No easy bruising, or bleeding gums  ALLERGY AND IMMUNOLOGIC: No hives or eczema    Vital Signs Last 24 Hrs  T(C): 36.9 (17 Sep 2024 11:34), Max: 37.1 (16 Sep 2024 17:46)  T(F): 98.4 (17 Sep 2024 11:34), Max: 98.7 (16 Sep 2024 17:46)  HR: 95 (17 Sep 2024 11:34) (75 - 111)  BP: 106/70 (17 Sep 2024 11:34) (95/67 - 108/68)  BP(mean): --  RR: 17 (17 Sep 2024 11:34) (17 - 18)  SpO2: 95% (17 Sep 2024 11:34) (94% - 95%)    Parameters below as of 17 Sep 2024 11:34  Patient On (Oxygen Delivery Method): room air        PHYSICAL EXAM:  GENERAL: NAD, well-groomed, well-developed  HEAD:  Atraumatic, Normocephalic  EYES: EOMI, PERRLA, conjunctiva and sclera clear  ENMT: No tonsillar erythema, exudates, or enlargement; Moist mucous membranes, Good dentition, No lesions  NECK: Supple, No JVD, Normal thyroid  NERVOUS SYSTEM:  Alert & Oriented X3, Good concentration; Motor Strength 5/5 B/L upper and lower extremities; DTRs 2+ intact and symmetric  CHEST/LUNG: Clear to auscultation bilaterally; No rales, rhonchi, wheezing, or rubs  HEART: Regular rate and rhythm; No murmurs, rubs, or gallops  ABDOMEN: Soft, Nontender, Nondistended; Bowel sounds present  EXTREMITIES:  right foot in surgical bandage  LYMPH: No lymphadenopathy noted  SKIN: No rashes or lesions    LABS:                        8.8    8.98  )-----------( 341      ( 17 Sep 2024 14:13 )             27.4           PTT - ( 17 Sep 2024 14:13 )  PTT:167.3 sec    CAPILLARY BLOOD GLUCOSE      POCT Blood Glucose.: 187 mg/dL (17 Sep 2024 12:04)  POCT Blood Glucose.: 179 mg/dL (17 Sep 2024 08:16)  POCT Blood Glucose.: 167 mg/dL (16 Sep 2024 21:28)  POCT Blood Glucose.: 149 mg/dL (16 Sep 2024 16:54)      RADIOLOGY & ADDITIONAL TESTS:    Notes Reviewed:  [x ] YES  [ ] NO    Care Discussed with Consultants/Other Providers [x ] YES  [ ] NO

## 2024-09-17 NOTE — PROVIDER CONTACT NOTE (OTHER) - DATE AND TIME:
10-Sep-2024 13:46
10-Sep-2024 18:15
10-Sep-2024 18:29
10-Sep-2024 18:30
10-Sep-2024 11:35
17-Sep-2024 05:30

## 2024-09-17 NOTE — CHART NOTE - NSCHARTNOTEFT_GEN_A_CORE
Called by Dr. Kendall to clarify vascular surgery plan for patient.  Discussion had with Dr. Hardin and Dr. Neville.  As per both attendings, was discussed with patient that since he doesn't ambulate, the risk to benefit ration of an angio isnt in his favor.  His best option would be an amputation.  Podiatry was notified that they could go ahead with a debridement, and if patient was amenable, we could amputate.  IF patient is interested in procedure, please reconsult as needed on this admission.  Thank you for your time.

## 2024-09-17 NOTE — PROVIDER CONTACT NOTE (OTHER) - SITUATION
stomach pain
bleeding from right foot debridement
Patient s/p right heel sharp excision, arrived with jacques in place. Hematuria present. H&H drawn as ordered for blood loss during surgery.
pt stomach pain, refused type and screen, bleeding from right foot debribement site, hematuria
Need stat order for Aptt draw

## 2024-09-17 NOTE — PROGRESS NOTE ADULT - SUBJECTIVE AND OBJECTIVE BOX
[facility]FECTIOUS DISE[facility]  Juventino Whitten MD PhD, Symone Hickey MD, Anne-Marie Li MD, Jeffery Jacobs MD, Ryan Romeo MD  and providing coverage with Melody Armstrong MD  Providing Infectious Disease Consultations at [facility], [facility], [facility]    Office# [phone] to schedule follow up appointments  Answering Service for urgent calls or New Consults [phone]  Cell# to text for urgent issues Juventino Whitten [phone]     infectious diseases progress note:    SARAH MORRISON is a 54y y. o. Male patient    Overnight and events of the last 24hrs reviewed    Allergies    fish (Hives)  ertapenem (Blisters; Rash)    Intolerances        ANTIBIOTICS/RELEVANT:  antimicrobials  trimethoprim  160 mG/sulfamethoxazole 800 mG 1 Tablet(s) Oral two times a day    immunologic:    OTHER:  acetaminophen     Tablet .. 650 milliGRAM(s) Oral every 6 hours PRN  aMIOdarone    Tablet   Oral   aMIOdarone    Tablet 200 milliGRAM(s) Oral daily  ascorbic acid 500 milliGRAM(s) Oral daily  aspirin enteric coated 81 milliGRAM(s) Oral daily  atorvastatin 40 milliGRAM(s) Oral at bedtime  cetirizine 10 milliGRAM(s) Oral daily  cholecalciferol 1000 Unit(s) Oral daily  dextrose 5%. 1000 milliLiter(s) IV Continuous <Continuous>  dextrose 5%. 1000 milliLiter(s) IV Continuous <Continuous>  dextrose 50% Injectable 25 Gram(s) IV Push once  dextrose 50% Injectable 25 Gram(s) IV Push once  dextrose 50% Injectable 12.5 Gram(s) IV Push once  dextrose Oral Gel 15 Gram(s) Oral once  digoxin     Tablet 125 MICROGram(s) Oral daily  famotidine    Tablet 20 milliGRAM(s) Oral daily  ferrous    sulfate 325 milliGRAM(s) Oral two times a day  gabapentin 100 milliGRAM(s) Oral three times a day  glucagon  Injectable 1 milliGRAM(s) IntraMuscular once  heparin   Injectable 4000 Unit(s) IV Push every 6 hours PRN  heparin   Injectable 8000 Unit(s) IV Push every 6 hours PRN  heparin  Infusion.  Unit(s)/Hr IV Continuous <Continuous>  HYDROmorphone  Injectable 1 milliGRAM(s) IV Push daily PRN  insulin lispro (ADMELOG) corrective regimen sliding scale   SubCutaneous three times a day before meals  insulin lispro (ADMELOG) corrective regimen sliding scale   SubCutaneous at bedtime  melatonin 5 milliGRAM(s) Oral at bedtime  metoprolol tartrate 12.5 milliGRAM(s) Oral every 6 hours  midodrine. 15 milliGRAM(s) Oral three times a day  multivitamin 1 Tablet(s) Oral daily  mupirocin 2% Nasal 1 Application(s) Both Nostrils two times a day  naloxegol 25 milliGRAM(s) Oral daily  oxyCODONE    IR 2.5 milliGRAM(s) Oral every 4 hours PRN  oxyCODONE  ER Tablet 10 milliGRAM(s) Oral every 12 hours  pantoprazole    Tablet 40 milliGRAM(s) Oral before breakfast  polyethylene glycol 3350 17 Gram(s) Oral daily  senna 2 Tablet(s) Oral at bedtime  sodium chloride 0.65% Nasal 1 Spray(s) Both Nostrils daily PRN  sucralfate 1 Gram(s) Oral two times a day  tiotropium 2.5 MICROgram(s)/olodaterol 2.5 MICROgram(s) (STIOLTO) Inhaler 2 Puff(s) Inhalation daily  traMADol 50 milliGRAM(s) Oral every 4 hours PRN      Objective:  Vital Signs Last 24 Hrs  T(C): 36.8 (17 Sep 2024 04:52), Max: 37.1 (16 Sep 2024 17:46)  T(F): 98.2 (17 Sep 2024 04:52), Max: 98.7 (16 Sep 2024 17:46)  HR: 95 (17 Sep 2024 04:52) (75 - 111)  BP: 105/65 (17 Sep 2024 04:52) (95/67 - 124/76)  BP(mean): --  RR: 18 (17 Sep 2024 04:52) (18 - 18)  SpO2: 94% (17 Sep 2024 04:52) (92% - 95%)    Parameters below as of 17 Sep 2024 04:52  Patient On (Oxygen Delivery Method): room air        T(C): 36.8 (09-17-24 @ 04:52), Max: 37.1 (09-16-24 @ 17:46)  T(C): 36.8 (09-17-24 @ 04:52), Max: 37.1 (09-14-24 @ 11:15)  T(C): 36.8 (09-17-24 @ 04:52), Max: 37.1 (09-13-24 @ 21:15)    PHYSICAL EXAM:  HEENT: NC atraumatic  Neck: supple  Respiratory: no accessory muscle use, breathing comfortably  Cardiovascular: distant  Gastrointestinal: normal appearing, nondistended  Extremities: no clubbing, no cyanosis,        LABS:                          9.3    10.38 )-----------( 362      ( 17 Sep 2024 06:46 )             28.7       WBC  10.38 09-17 @ 06:46  9.33 09-15 @ 11:59  9.13 09-14 @ 09:50  11.32 09-13 @ 10:18  10.01 09-12 @ 06:10      09-15    137  |  108  |  10  ----------------------------<  146[H]  4.2   |  21[L]  |  0.77    Ca    8.0[L]      15 Sep 2024 11:59    TPro  4.9[L]  /  Alb  1.5[L]  /  TBili  0.3  /  DBili  x   /  AST  21  /  ALT  22  /  AlkPhos  92  09-15      Creatinine: 0.77 mg/dL (09-15-24 @ 11:59)  Creatinine: 0.86 mg/dL (09-13-24 @ 10:18)  Creatinine: 1.10 mg/dL (09-12-24 @ 06:10)      PTT - ( 17 Sep 2024 06:46 )  PTT:45.2 sec  Urinalysis Basic - ( 15 Sep 2024 11:59 )    Color: x / Appearance: x / SG: x / pH: x  Gluc: 146 mg/dL / Ketone: x  / Bili: x / Urobili: x   Blood: x / Protein: x / Nitrite: x   Leuk Esterase: x / RBC: x / WBC x   Sq Epi: x / Non Sq Epi: x / Bacteria: x            INFLAMMATORY MARKERS      MICROBIOLOGY:              RADIOLOGY & ADDITIONAL STUDIES:

## 2024-09-17 NOTE — PROGRESS NOTE ADULT - SUBJECTIVE AND OBJECTIVE BOX
CAPILLARY BLOOD GLUCOSE      POCT Blood Glucose.: 167 mg/dL (16 Sep 2024 21:28)  POCT Blood Glucose.: 149 mg/dL (16 Sep 2024 16:54)  POCT Blood Glucose.: 161 mg/dL (16 Sep 2024 12:26)  POCT Blood Glucose.: 93 mg/dL (16 Sep 2024 08:41)      Vital Signs Last 24 Hrs  T(C): 36.8 (17 Sep 2024 04:52), Max: 37.1 (16 Sep 2024 17:46)  T(F): 98.2 (17 Sep 2024 04:52), Max: 98.7 (16 Sep 2024 17:46)  HR: 95 (17 Sep 2024 04:52) (75 - 111)  BP: 105/65 (17 Sep 2024 04:52) (95/67 - 124/76)  BP(mean): --  RR: 18 (17 Sep 2024 04:52) (18 - 18)  SpO2: 94% (17 Sep 2024 04:52) (92% - 95%)    Parameters below as of 17 Sep 2024 04:52  Patient On (Oxygen Delivery Method): room air      Respiratory: CTA B/L  CV: RRR, S1S2, no murmurs, rubs or gallops  Abdominal: Soft, NT, ND +BS, Last BM  Extremities: No edema, + peripheral pulses     09-15    137  |  108  |  10  ----------------------------<  146<H>  4.2   |  21<L>  |  0.77    Ca    8.0<L>      15 Sep 2024 11:59    TPro  4.9<L>  /  Alb  1.5<L>  /  TBili  0.3  /  DBili  x   /  AST  21  /  ALT  22  /  AlkPhos  92  09-15      atorvastatin 40 milliGRAM(s) Oral at bedtime  dextrose 50% Injectable 25 Gram(s) IV Push once  dextrose 50% Injectable 25 Gram(s) IV Push once  dextrose 50% Injectable 12.5 Gram(s) IV Push once  dextrose Oral Gel 15 Gram(s) Oral once  glucagon  Injectable 1 milliGRAM(s) IntraMuscular once  insulin lispro (ADMELOG) corrective regimen sliding scale   SubCutaneous three times a day before meals  insulin lispro (ADMELOG) corrective regimen sliding scale   SubCutaneous at bedtime

## 2024-09-17 NOTE — PROVIDER CONTACT NOTE (OTHER) - BACKGROUND
Pt is on heprin drip. APTT draw was not done at 22:50 (09/16/24) Need an order to draw APTT
history of type 2 diabetes.

## 2024-09-17 NOTE — PROGRESS NOTE ADULT - SUBJECTIVE AND OBJECTIVE BOX
[facility] -- Brittany Lutz, Reynaldo Sweeney Savella, Goodger, Cohen: Office # [phone]    Follow Up:  Cardiac clearance     Subjective/Observations: Patient awake, alert, resting in bed. Denies chest pain, palpitations and dizziness. Denies any difficulty breathing. No orthopnea and PND. Tolerating room air. Plan for angiogram today. On Heparin gtt     REVIEW OF SYSTEMS: All other review of systems are negative unless indicated above    PAST MEDICAL & SURGICAL HISTORY:  Diabetes      Diabetes mellitus with no complication      Afib      Hypertension      Hypertension      BPH (benign prostatic hyperplasia)      Perforated gastric ulcer  s/p emergent ex-lap omentopexy and plication 6/2019      Pulmonary embolism      History of non-ST elevation myocardial infarction (NSTEMI)      Osteomyelitis  s/p debridement      CAD S/P percutaneous coronary angioplasty      Cerebrovascular accident      H/O abdominal surgery      Perforated gastric ulcer      Traumatic amputation of left foot, initial encounter    MEDICATIONS  (STANDING):  aMIOdarone    Tablet 200 milliGRAM(s) Oral daily  aMIOdarone    Tablet   Oral   ascorbic acid 500 milliGRAM(s) Oral daily  aspirin enteric coated 81 milliGRAM(s) Oral daily  atorvastatin 40 milliGRAM(s) Oral at bedtime  cetirizine 10 milliGRAM(s) Oral daily  cholecalciferol 1000 Unit(s) Oral daily  dextrose 5%. 1000 milliLiter(s) (100 mL/Hr) IV Continuous <Continuous>  dextrose 5%. 1000 milliLiter(s) (50 mL/Hr) IV Continuous <Continuous>  dextrose 50% Injectable 25 Gram(s) IV Push once  dextrose 50% Injectable 25 Gram(s) IV Push once  dextrose 50% Injectable 12.5 Gram(s) IV Push once  dextrose Oral Gel 15 Gram(s) Oral once  digoxin     Tablet 125 MICROGram(s) Oral daily  famotidine    Tablet 20 milliGRAM(s) Oral daily  ferrous    sulfate 325 milliGRAM(s) Oral two times a day  gabapentin 100 milliGRAM(s) Oral three times a day  glucagon  Injectable 1 milliGRAM(s) IntraMuscular once  heparin  Infusion.  Unit(s)/Hr (18 mL/Hr) IV Continuous <Continuous>  insulin lispro (ADMELOG) corrective regimen sliding scale   SubCutaneous three times a day before meals  insulin lispro (ADMELOG) corrective regimen sliding scale   SubCutaneous at bedtime  melatonin 5 milliGRAM(s) Oral at bedtime  metoprolol tartrate 12.5 milliGRAM(s) Oral every 6 hours  midodrine. 15 milliGRAM(s) Oral three times a day  multivitamin 1 Tablet(s) Oral daily  mupirocin 2% Nasal 1 Application(s) Both Nostrils two times a day  naloxegol 25 milliGRAM(s) Oral daily  oxyCODONE  ER Tablet 10 milliGRAM(s) Oral every 12 hours  pantoprazole    Tablet 40 milliGRAM(s) Oral before breakfast  polyethylene glycol 3350 17 Gram(s) Oral daily  senna 2 Tablet(s) Oral at bedtime  sucralfate 1 Gram(s) Oral two times a day  tiotropium 2.5 MICROgram(s)/olodaterol 2.5 MICROgram(s) (STIOLTO) Inhaler 2 Puff(s) Inhalation daily  trimethoprim  160 mG/sulfamethoxazole 800 mG 1 Tablet(s) Oral two times a day    MEDICATIONS  (PRN):  acetaminophen     Tablet .. 650 milliGRAM(s) Oral every 6 hours PRN Temp greater or equal to 38C (100.4F)  heparin   Injectable 4000 Unit(s) IV Push every 6 hours PRN For aPTT between 40 - 57  heparin   Injectable 8000 Unit(s) IV Push every 6 hours PRN For aPTT less than 40  HYDROmorphone  Injectable 1 milliGRAM(s) IV Push daily PRN Severe Pain (7 - 10)  oxyCODONE    IR 2.5 milliGRAM(s) Oral every 4 hours PRN Moderate Pain (4 - 6)  sodium chloride 0.65% Nasal 1 Spray(s) Both Nostrils daily PRN Nasal Congestion  traMADol 50 milliGRAM(s) Oral every 4 hours PRN Mild Pain (1 - 3)    Allergies  fish (Hives)  ertapenem (Blisters; Rash)    Vital Signs Last 24 Hrs  T(C): 36.8 (17 Sep 2024 04:52), Max: 37.1 (16 Sep 2024 17:46)  T(F): 98.2 (17 Sep 2024 04:52), Max: 98.7 (16 Sep 2024 17:46)  HR: 95 (17 Sep 2024 04:52) (75 - 111)  BP: 105/65 (17 Sep 2024 04:52) (95/67 - 124/76)  BP(mean): --  RR: 18 (17 Sep 2024 04:52) (18 - 18)  SpO2: 94% (17 Sep 2024 04:52) (92% - 95%)    Parameters below as of 17 Sep 2024 04:52  Patient On (Oxygen Delivery Method): room air      I&O's Summary    16 Sep 2024 07:01  -  17 Sep 2024 07:00  --------------------------------------------------------  IN: 0 mL / OUT: 2100 mL / NET: -2100 mL       TELE: Not on telemetry   PHYSICAL EXAM:  Constitutional: NAD, awake and alert  HEENT: Moist Mucous Membranes, Anicteric  Pulmonary: Non-labored, breath sounds are clear bilaterally, No wheezing, rales or rhonchi  Cardiovascular: Regular, S1 and S2, No murmurs, No rubs, gallops or clicks  Gastrointestinal:  soft, nontender, nondistended   Lymph: +2 peripheral edema. No lymphadenopathy.   Skin: No visible rashes Right foot DSD intact.   Psych:  Mood & affect appropriate      LABS: All Labs Reviewed:                        9.3    10.38 )-----------( 362      ( 17 Sep 2024 06:46 )             28.7                         8.7    9.33  )-----------( 304      ( 15 Sep 2024 11:59 )             28.7     15 Sep 2024 11:59    137    |  108    |  10     ----------------------------<  146    4.2     |  21     |  0.77     Ca    8.0        15 Sep 2024 11:59    TPro  4.9    /  Alb  1.5    /  TBili  0.3    /  DBili  x      /  AST  21     /  ALT  22     /  AlkPhos  92     15 Sep 2024 11:59   LIVER FUNCTIONS - ( 15 Sep 2024 11:59 )  Alb: 1.5 g/dL / Pro: 4.9 g/dL / ALK PHOS: 92 U/L / ALT: 22 U/L / AST: 21 U/L / GGT: x           PTT - ( 17 Sep 2024 06:46 )  PTT:45.2 sec      TRANSTHORACIC ECHOCARDIOGRAM REPORT  ________________________________________________________________________________                                      _______       Pt. Name:       SARAH MORRISON Study Date:    9/5/2024  MRN:            [id number]         YOB: 1970  Accession #:    [id number]        Age:           54 years  Account#:       [id number]       Gender:        M  Heart Rate:                      Height:        74.02 in (188.00 cm)  Rhythm:                          Weight:        207.23 lb (94.00 kg)  Blood Pressure: 97/60 mmHg       BSA/BMI:       2.21 m² / 26.60 kg/m²  ________________________________________________________________________________________  Referring Physician:    [id number] Hill Brizuela  Interpreting Physician: Claudette Jacobs  Primary Sonographer:    Mounika FELDMAN    CPT:               ECHO TTE WO CON COMP W DOPP - 64509.m  Indication(s):     Abnormal electrocardiogram ECG/EKG - R94.31  Procedure:         Transthoracic echocardiogram with2-D, M-mode and complete                     spectral and color flow Doppler.  Ordering Location: [facility]  Admission Status:  Inpatient  Study Information: Image quality for this study is technically difficult.    _______________________________________________________________________________________     CONCLUSIONS:      1. Technically difficult image quality.   2. Left ventricular cavity is normal in size. Left ventricular wall thickness is normal. Left ventricular systolic function is normal withan ejection fraction of 69 % by Castañeda's method of disks. There are no regional wall motion abnormalities seen.   3. There is increased LV mass and concentric hypertrophy.   4. Normal right ventricular cavity size and normal right ventricular systolic function.   5. Left atrium is severely dilated.   6. The right atrium is severely dilated.   7. There is calcification of the mitral valve annulus.   8. Mitral valve leaflets are diffusely calcified.   9. Moderate to severe mitral regurgitation.  10. Trileaflet aortic valve with normal systolic excursion. There is calcification of the aortic valve leaflets.  11. Mild tricuspid regurgitation.  12. Estimated pulmonary artery systolic pressure is 39 mmHg, consistent with borderline pulmonary hypertension.  13. The inferior vena cava is normal in size measuring 1.75 cm in diameter, (normal <2.1cm) with normal inspiratory collapse (normal >50%) consistent with normal right atrial pressure (~3, range 0-5mmHg).    ________________________________________________________________________________________  FINDINGS:     Left Ventricle:  The left ventricular cavity is normal in size. Left ventricular wall thickness is normal. Left ventricular systolic function is normal with a calculated ejection fraction of 69 % by the Castañeda's biplane method of disks. There are no regional wall motion abnormalities seen. There is increased LV mass and concentric hypertrophy. The left ventricular diastolic function is indeterminate.     Right Ventricle:  The right ventricular cavity is normal in size and right ventricular systolic function is normal.     Left Atrium:  The left atrium is severely dilated with an indexed volume of 31.95 ml/m².     Right Atrium:  The right atrium is severely dilated with an indexed volume of 26.74 ml/m².     Aortic Valve:  The aortic valve appears trileaflet with normal systolic excursion. There is calcification of the aortic valve leaflets.     Mitral Valve:  There is calcification of the mitral valve annulus. Mitral valve leaflets are diffusely calcified. There is moderate to severe mitral regurgitation.     Tricuspid Valve:  Structurally normal tricuspid valve with normal leaflet excursion. There is mild tricuspid regurgitation. Estimated pulmonary artery systolicpressure is 39 mmHg, consistent with borderline pulmonary hypertension.     Pulmonic Valve:  The pulmonic valve was not well visualized. There is trace pulmonic regurgitation.     Systemic Veins:  The inferior vena cava is normal in size measuring 1.75 cm in diameter, (normal <2.1cm) with normal inspiratory collapse (normal >50%) consistent with normal right atrial pressure (~3, range 0-5mmHg).  ____________________________________________________________________  QUANTITATIVE DATA:  Left Ventricle Measurements: (Indexed to BSA)     IVSd (2D):   1.4 cm  LVPWd (2D):  1.3 cm  LVIDd (2D):  5.4 cm  LVIDs (2D):  4.0 cm  LV Mass:     306 g  138.7 g/m²  LV Vol d, MOD A2C: 87.4 ml 39.62 ml/m²  LV Vol d, MOD A4C: 81.2 ml 36.78 ml/m²  LV Vol d, MOD BP: 92.3 ml 41.82 ml/m²  LV Vol s, MOD A2C: 29.0 ml 13.13 ml/m²  LV Vol s, MOD A4C: 28.3 ml 12.82 ml/m²  LV Vol s, MOD BP:  28.9 ml 13.08 ml/m²  LVOT SV MOD BP:    63.4 ml  LV EF% MOD BP:     69 %     MV E Vmax:    1.19 m/s  e' lateral:   13.10 cm/s  e'medial:    10.70 cm/s  E/e' lateral: 9.05  E/e' medial:  12.00  E/e' Average: 9.96  MV DT:        148 msec    Aorta Measurements: (Normal range) (Indexed to BSA)     Ao Root d 3.23 cm (3.1 - 3.7 cm) 1.46 cm/m²            Left Atrium Measurements: (Indexed to BSA)  LA Diam 2D: 4.72 cm         Right Atrial Measurements:     RA Vol:       59.00 ml  RA Vol Index: 26.74 ml/m²       LVOT / RVOT/ Qp/Qs Data: (Indexed to BSA)  LVOT Diameter: 2.29 cm  LVOT Area:     4.12 cm²    Mitral Valve Measurements:     MV E Vmax: 1.2 m/s       Tricuspid Valve Measurements:     TR Vmax:          3.0 m/s  TR Peak Gradient: 35.9 mmHg  RA Pressure:      3 mmHg  PASP:             39 mmHg    ________________________________________________________________________________________  Electronically signed on 9/6/2024 at 10:38:59 AM by Claudette Jacobs         *** Final ***   [facility] -- Brittany Lutz, Reynaldo Sweeney Savella, Goodger, Cohen: Office # [phone]    Follow Up:  Cardiac clearance     Subjective/Observations: Patient awake, alert, resting in bed. Denies chest pain, palpitations and dizziness. Denies any difficulty breathing. No orthopnea and PND. Tolerating room air. Plan for angiogram today. On Heparin gtt     REVIEW OF SYSTEMS: All other review of systems are negative unless indicated above    PAST MEDICAL & SURGICAL HISTORY:  Diabetes      Diabetes mellitus with no complication      Afib      Hypertension      Hypertension      BPH (benign prostatic hyperplasia)      Perforated gastric ulcer  s/p emergent ex-lap omentopexy and plication 6/2019      Pulmonary embolism      History of non-ST elevation myocardial infarction (NSTEMI)  Osteomyelitis  s/p debridement  CAD S/P percutaneous coronary angioplasty  Cerebrovascular accident  H/O abdominal surgery  Perforated gastric ulcer  Traumatic amputation of left foot, initial encounter    MEDICATIONS  (STANDING):  aMIOdarone    Tablet 200 milliGRAM(s) Oral daily  aMIOdarone    Tablet   Oral   ascorbic acid 500 milliGRAM(s) Oral daily  aspirin enteric coated 81 milliGRAM(s) Oral daily  atorvastatin 40 milliGRAM(s) Oral at bedtime  cetirizine 10 milliGRAM(s) Oral daily  cholecalciferol 1000 Unit(s) Oral daily  dextrose 5%. 1000 milliLiter(s) (100 mL/Hr) IV Continuous <Continuous>  dextrose 5%. 1000 milliLiter(s) (50 mL/Hr) IV Continuous <Continuous>  dextrose 50% Injectable 25 Gram(s) IV Push once  dextrose 50% Injectable 25 Gram(s) IV Push once  dextrose 50% Injectable 12.5 Gram(s) IV Push once  dextrose Oral Gel 15 Gram(s) Oral once  digoxin     Tablet 125 MICROGram(s) Oral daily  famotidine    Tablet 20 milliGRAM(s) Oral daily  ferrous    sulfate 325 milliGRAM(s) Oral two times a day  gabapentin 100 milliGRAM(s) Oral three times a day  glucagon  Injectable 1 milliGRAM(s) IntraMuscular once  heparin  Infusion.  Unit(s)/Hr (18 mL/Hr) IV Continuous <Continuous>  insulin lispro (ADMELOG) corrective regimen sliding scale   SubCutaneous three times a day before meals  insulin lispro (ADMELOG) corrective regimen sliding scale   SubCutaneous at bedtime  melatonin 5 milliGRAM(s) Oral at bedtime  metoprolol tartrate 12.5 milliGRAM(s) Oral every 6 hours  midodrine. 15 milliGRAM(s) Oral three times a day  multivitamin 1 Tablet(s) Oral daily  mupirocin 2% Nasal 1 Application(s) Both Nostrils two times a day  naloxegol 25 milliGRAM(s) Oral daily  oxyCODONE  ER Tablet 10 milliGRAM(s) Oral every 12 hours  pantoprazole    Tablet 40 milliGRAM(s) Oral before breakfast  polyethylene glycol 3350 17 Gram(s) Oral daily  senna 2 Tablet(s) Oral at bedtime  sucralfate 1 Gram(s) Oral two times a day  tiotropium 2.5 MICROgram(s)/olodaterol 2.5 MICROgram(s) (STIOLTO) Inhaler 2 Puff(s) Inhalation daily  trimethoprim  160 mG/sulfamethoxazole 800 mG 1 Tablet(s) Oral two times a day    MEDICATIONS  (PRN):  acetaminophen     Tablet .. 650 milliGRAM(s) Oral every 6 hours PRN Temp greater or equal to 38C (100.4F)  heparin   Injectable 4000 Unit(s) IV Push every 6 hours PRN For aPTT between 40 - 57  heparin   Injectable 8000 Unit(s) IV Push every 6 hours PRN For aPTT less than 40  HYDROmorphone  Injectable 1 milliGRAM(s) IV Push daily PRN Severe Pain (7 - 10)  oxyCODONE    IR 2.5 milliGRAM(s) Oral every 4 hours PRN Moderate Pain (4 - 6)  sodium chloride 0.65% Nasal 1 Spray(s) Both Nostrils daily PRN Nasal Congestion  traMADol 50 milliGRAM(s) Oral every 4 hours PRN Mild Pain (1 - 3)    Allergies  fish (Hives)  ertapenem (Blisters; Rash)    Vital Signs Last 24 Hrs  T(C): 36.8 (17 Sep 2024 04:52), Max: 37.1 (16 Sep 2024 17:46)  T(F): 98.2 (17 Sep 2024 04:52), Max: 98.7 (16 Sep 2024 17:46)  HR: 95 (17 Sep 2024 04:52) (75 - 111)  BP: 105/65 (17 Sep 2024 04:52) (95/67 - 124/76)  BP(mean): --  RR: 18 (17 Sep 2024 04:52) (18 - 18)  SpO2: 94% (17 Sep 2024 04:52) (92% - 95%)    Parameters below as of 17 Sep 2024 04:52  Patient On (Oxygen Delivery Method): room air      I&O's Summary    16 Sep 2024 07:01  -  17 Sep 2024 07:00  --------------------------------------------------------  IN: 0 mL / OUT: 2100 mL / NET: -2100 mL       TELE: Not on telemetry   PHYSICAL EXAM:  Constitutional: NAD, awake and alert  HEENT: Moist Mucous Membranes, Anicteric  Pulmonary: Non-labored, breath sounds are clear bilaterally, No wheezing, rales or rhonchi  Cardiovascular: Regular, S1 and S2, No murmurs, No rubs, gallops or clicks  Gastrointestinal:  soft, nontender, nondistended   Lymph: +2 peripheral edema. No lymphadenopathy.   Skin: No visible rashes Right foot DSD intact.   Psych:  Mood & affect appropriate      LABS: All Labs Reviewed:                        9.3    10.38 )-----------( 362      ( 17 Sep 2024 06:46 )             28.7                         8.7    9.33  )-----------( 304      ( 15 Sep 2024 11:59 )             28.7     15 Sep 2024 11:59    137    |  108    |  10     ----------------------------<  146    4.2     |  21     |  0.77     Ca    8.0        15 Sep 2024 11:59    TPro  4.9    /  Alb  1.5    /  TBili  0.3    /  DBili  x      /  AST  21     /  ALT  22     /  AlkPhos  92     15 Sep 2024 11:59   LIVER FUNCTIONS - ( 15 Sep 2024 11:59 )  Alb: 1.5 g/dL / Pro: 4.9 g/dL / ALK PHOS: 92 U/L / ALT: 22 U/L / AST: 21 U/L / GGT: x           PTT - ( 17 Sep 2024 06:46 )  PTT:45.2 sec      TRANSTHORACIC ECHOCARDIOGRAM REPORT  ________________________________________________________________________________                                      _______       Pt. Name:       SAARH MORRISON Study Date:    9/5/2024  MRN:            [id number]         YOB: 1970  Accession #:    [id number]        Age:           54 years  Account#:       [id number]       Gender:        M  Heart Rate:                      Height:        74.02 in (188.00 cm)  Rhythm:                          Weight:        207.23 lb (94.00 kg)  Blood Pressure: 97/60 mmHg       BSA/BMI:       2.21 m² / 26.60 kg/m²  ________________________________________________________________________________________  Referring Physician:    [id number] Hill Brizuela  Interpreting Physician: Claudette Jacobs  Primary Sonographer:    Mounika FELDMAN    CPT:               ECHO TTE WO CON COMP W DOPP - 19292.m  Indication(s):     Abnormal electrocardiogram ECG/EKG - R94.31  Procedure:         Transthoracic echocardiogram with2-D, M-mode and complete                     spectral and color flow Doppler.  Ordering Location: [facility]  Admission Status:  Inpatient  Study Information: Image quality for this study is technically difficult.    _______________________________________________________________________________________     CONCLUSIONS:      1. Technically difficult image quality.   2. Left ventricular cavity is normal in size. Left ventricular wall thickness is normal. Left ventricular systolic function is normal withan ejection fraction of 69 % by Castañeda's method of disks. There are no regional wall motion abnormalities seen.   3. There is increased LV mass and concentric hypertrophy.   4. Normal right ventricular cavity size and normal right ventricular systolic function.   5. Left atrium is severely dilated.   6. The right atrium is severely dilated.   7. There is calcification of the mitral valve annulus.   8. Mitral valve leaflets are diffusely calcified.   9. Moderate to severe mitral regurgitation.  10. Trileaflet aortic valve with normal systolic excursion. There is calcification of the aortic valve leaflets.  11. Mild tricuspid regurgitation.  12. Estimated pulmonary artery systolic pressure is 39 mmHg, consistent with borderline pulmonary hypertension.  13. The inferior vena cava is normal in size measuring 1.75 cm in diameter, (normal <2.1cm) with normal inspiratory collapse (normal >50%) consistent with normal right atrial pressure (~3, range 0-5mmHg).    ________________________________________________________________________________________  FINDINGS:     Left Ventricle:  The left ventricular cavity is normal in size. Left ventricular wall thickness is normal. Left ventricular systolic function is normal with a calculated ejection fraction of 69 % by the Castañeda's biplane method of disks. There are no regional wall motion abnormalities seen. There is increased LV mass and concentric hypertrophy. The left ventricular diastolic function is indeterminate.     Right Ventricle:  The right ventricular cavity is normal in size and right ventricular systolic function is normal.     Left Atrium:  The left atrium is severely dilated with an indexed volume of 31.95 ml/m².     Right Atrium:  The right atrium is severely dilated with an indexed volume of 26.74 ml/m².     Aortic Valve:  The aortic valve appears trileaflet with normal systolic excursion. There is calcification of the aortic valve leaflets.     Mitral Valve:  There is calcification of the mitral valve annulus. Mitral valve leaflets are diffusely calcified. There is moderate to severe mitral regurgitation.     Tricuspid Valve:  Structurally normal tricuspid valve with normal leaflet excursion. There is mild tricuspid regurgitation. Estimated pulmonary artery systolicpressure is 39 mmHg, consistent with borderline pulmonary hypertension.     Pulmonic Valve:  The pulmonic valve was not well visualized. There is trace pulmonic regurgitation.     Systemic Veins:  The inferior vena cava is normal in size measuring 1.75 cm in diameter, (normal <2.1cm) with normal inspiratory collapse (normal >50%) consistent with normal right atrial pressure (~3, range 0-5mmHg).  ____________________________________________________________________  QUANTITATIVE DATA:  Left Ventricle Measurements: (Indexed to BSA)     IVSd (2D):   1.4 cm  LVPWd (2D):  1.3 cm  LVIDd (2D):  5.4 cm  LVIDs (2D):  4.0 cm  LV Mass:     306 g  138.7 g/m²  LV Vol d, MOD A2C: 87.4 ml 39.62 ml/m²  LV Vol d, MOD A4C: 81.2 ml 36.78 ml/m²  LV Vol d, MOD BP: 92.3 ml 41.82 ml/m²  LV Vol s, MOD A2C: 29.0 ml 13.13 ml/m²  LV Vol s, MOD A4C: 28.3 ml 12.82 ml/m²  LV Vol s, MOD BP:  28.9 ml 13.08 ml/m²  LVOT SV MOD BP:    63.4 ml  LV EF% MOD BP:     69 %     MV E Vmax:    1.19 m/s  e' lateral:   13.10 cm/s  e'medial:    10.70 cm/s  E/e' lateral: 9.05  E/e' medial:  12.00  E/e' Average: 9.96  MV DT:        148 msec    Aorta Measurements: (Normal range) (Indexed to BSA)     Ao Root d 3.23 cm (3.1 - 3.7 cm) 1.46 cm/m²            Left Atrium Measurements: (Indexed to BSA)  LA Diam 2D: 4.72 cm         Right Atrial Measurements:     RA Vol:       59.00 ml  RA Vol Index: 26.74 ml/m²       LVOT / RVOT/ Qp/Qs Data: (Indexed to BSA)  LVOT Diameter: 2.29 cm  LVOT Area:     4.12 cm²    Mitral Valve Measurements:     MV E Vmax: 1.2 m/s       Tricuspid Valve Measurements:     TR Vmax:          3.0 m/s  TR Peak Gradient: 35.9 mmHg  RA Pressure:      3 mmHg  PASP:             39 mmHg    ________________________________________________________________________________________  Electronically signed on 9/6/2024 at 10:38:59 AM by Claudette Jacobs         *** Final ***

## 2024-09-17 NOTE — PROGRESS NOTE ADULT - PROBLEM SELECTOR PLAN 1
S/P IV ABX - zosyn per ID  ID eval with dr. lan appreciated   OM  ? MR  S/p debridement and partial calcanectomy per podiatry   may need BKA  vascular eval  now for angio per vascular on ? coming tuesday

## 2024-09-17 NOTE — PROVIDER CONTACT NOTE (OTHER) - REASON
Blood glucose 87mg/dl pre-op.
Need stat order for APTT draw
bleeding from right foot debridement
stomach pain
Hematuria from jacques
pt stomach pain, refused type and screen, bleeding from right foot debribement site, hematuria

## 2024-09-17 NOTE — PROGRESS NOTE ADULT - ASSESSMENT
55 YO M with past medical history of AFib (on Eliquis), s/p cardiac arrest x 2 w/ AHRF (on recent admission), indwelling Aguilera, cerebral aneurysm, CAD (s/p stents), CVA, T2DM, HTN, OM (s/p debridement), PE, perforated gastric ulcer s/p ometnopexy 2019 with Dr. Mejia who presents with possible gangrenous right foot. In ED pt found to be in afib with RVR and hypotension.    Afib with RVR, CAD, HTN   - Likely, in the setting of septic shock from gangrenous foot.   - S/p Cardizem gtt and Dig load.  Amiodarone PO load (5Gm)  - Continue  amio 200 mg PO qd  - Continue digoxin 125 pO QD.  Dig level = 1.1 (9/9)  - Continue metoprolol 12.5 QID. Plan to switch to long-acting  - Eliquis on hold, now on Heparin gtt, for angio 9/17 per Vascular notes    - TTE: normal LVF with mod-severe MR,  increased LV mass and concentric hypertrophy, severely dilated LA and RA,  borderline pulmonary hypertension.  - No evidence of volume overload other than LE edema.   - Would trial with Lasix 40 mg IV x 1 after angiogram     - BP stable and controlled  - Continue Midodrine 15mg TID for bp support    - With known Hx of CAD.    - EKG nonischemic  - No evidence of any active ischemia   - Continue ASA and statin.      - S/P partial calcanectomy of right foot, 9/10, pending path, Podia following   - Abx per ID  - Eliquis on hold, now on Heparin gtt, for angio 9/17 per Vascular notes  - Pt has no active ischemia, decompensated heart failure, unstable arrythmia, or severe stenotic valvular disease. Patient is optimized from cardiovascular standpoint to proceed with planned procedure with routine hemodynamic monitoring.   - Continue to trend and transfuse as needed to keep Hgb ~8    - Monitor and replete lytes, keep K>4, Mg>2.  - Will continue to follow.    Danny Arce, MS FNP, AGACNP  Nurse Practitioner- Cardiology   Please call on TEAMS     55 YO M with past medical history of AFib (on Eliquis), s/p cardiac arrest x 2 w/ AHRF (on recent admission), indwelling Aguilera, cerebral aneurysm, CAD (s/p stents), CVA, T2DM, HTN, OM (s/p debridement), PE, perforated gastric ulcer s/p ometnopexy 2019 with Dr. Mejia who presents with possible gangrenous right foot. In ED pt found to be in afib with RVR and hypotension.    Afib with RVR, CAD, HTN   - Likely, in the setting of septic shock from gangrenous foot.   - S/p Cardizem gtt and Dig load.  Amiodarone PO load (5Gm)  - Continue  amio 200 mg PO qd  - Continue digoxin 125 pO QD.  Dig level = 1.1 (9/9)  - Continue metoprolol 12.5 QID. Plan to switch to long-acting  - Eliquis on hold, now on Heparin gtt, for angio 9/17 per Vascular notes    - TTE: normal LVF with mod-severe MR,  increased LV mass and concentric hypertrophy, severely dilated LA and RA,  borderline pulmonary hypertension.  - No evidence of volume overload other than LE edema.   - Would trial with Lasix 40 mg IV x 1 after angiogram but needs repeat BMP first.     - BP stable and controlled  - Continue Midodrine 15mg TID for bp support, wean as able    - With known Hx of CAD.    - EKG nonischemic  - No evidence of any active ischemia   - Continue ASA and statin.      - S/P partial calcanectomy of right foot, 9/10, pending path, Podia following   - Abx per ID  - Eliquis on hold, now on Heparin gtt, for angio 9/17 per Vascular notes  - Pt has no active ischemia, decompensated heart failure, unstable arrythmia, or severe stenotic valvular disease. Patient is optimized from cardiovascular standpoint to proceed with planned procedure with routine hemodynamic monitoring.   - Continue to trend and transfuse as needed to keep Hgb ~8    - Monitor and replete lytes, keep K>4, Mg>2.  - Will continue to follow.    Danny Arce, MS FNP, AGACNP  Nurse Practitioner- Cardiology   Please call on TEAMS

## 2024-09-17 NOTE — PROGRESS NOTE ADULT - ASSESSMENT
54m with dm, cad, pad, dvt atrial fib, hypertension, stroke, indwelling jacques, PE, indwelling jacques presents from wound care with  infected right heel ulcer  r/o osteomyelitis  leukocytosis   hypotension, atrial fib    Right Heel Osteomyelitis  Culture - Wound Aerobic/Anaerobic (09.05.24 @ 12:55):    Organism: Escherichia coli ESBL   Organism: Citrobacter freundii   Organism: Providencia stuartii  noted severe ertapenem reaction  surgery, 09/10/2024  right partial calcanectomy,  pt reports having metal stents that prevent MRI  Culture - Tissue with Gram Stain (09.10.24 @ 12:30)  Moderate Staphylococcus aureus-MRSA, Escherichia coli ESBL and mixed microbes all sensitive to Bactrim  PATH Portion of bone with acute and chronic osteomyelitis.  aztreonam--> changed to Bactrim 9/9-continue for now with recommended full dose x 6 weeks so until 10/21 then potential for chronic suppression with daily Bactrim SS    Thank you for consulting us and involving us in the management of this most interesting and challenging case.  We will follow along in the care of this patient. Please call us at [phone] or text me directly on my cell# at [phone] with any concerns.

## 2024-09-18 ENCOUNTER — TRANSCRIPTION ENCOUNTER (OUTPATIENT)
Age: 54
End: 2024-09-18

## 2024-09-18 VITALS
TEMPERATURE: 99 F | HEART RATE: 74 BPM | DIASTOLIC BLOOD PRESSURE: 76 MMHG | OXYGEN SATURATION: 94 % | SYSTOLIC BLOOD PRESSURE: 113 MMHG | RESPIRATION RATE: 19 BRPM

## 2024-09-18 LAB
GLUCOSE BLDC GLUCOMTR-MCNC: 142 MG/DL — HIGH (ref 70–99)
GLUCOSE BLDC GLUCOMTR-MCNC: 159 MG/DL — HIGH (ref 70–99)
GLUCOSE BLDC GLUCOMTR-MCNC: 209 MG/DL — HIGH (ref 70–99)
HCT VFR BLD CALC: 28.6 % — LOW (ref 39–50)
HGB BLD-MCNC: 9 G/DL — LOW (ref 13–17)
MCHC RBC-ENTMCNC: 30.2 PG — SIGNIFICANT CHANGE UP (ref 27–34)
MCHC RBC-ENTMCNC: 31.5 GM/DL — LOW (ref 32–36)
MCV RBC AUTO: 96 FL — SIGNIFICANT CHANGE UP (ref 80–100)
NRBC # BLD: 0 /100 WBCS — SIGNIFICANT CHANGE UP (ref 0–0)
PLATELET # BLD AUTO: 379 K/UL — SIGNIFICANT CHANGE UP (ref 150–400)
RBC # BLD: 2.98 M/UL — LOW (ref 4.2–5.8)
RBC # FLD: 20.3 % — HIGH (ref 10.3–14.5)
WBC # BLD: 6.99 K/UL — SIGNIFICANT CHANGE UP (ref 3.8–10.5)
WBC # FLD AUTO: 6.99 K/UL — SIGNIFICANT CHANGE UP (ref 3.8–10.5)

## 2024-09-18 PROCEDURE — 87641 MR-STAPH DNA AMP PROBE: CPT

## 2024-09-18 PROCEDURE — 80048 BASIC METABOLIC PNL TOTAL CA: CPT

## 2024-09-18 PROCEDURE — 74176 CT ABD & PELVIS W/O CONTRAST: CPT | Mod: MC

## 2024-09-18 PROCEDURE — 83605 ASSAY OF LACTIC ACID: CPT

## 2024-09-18 PROCEDURE — 86900 BLOOD TYPING SEROLOGIC ABO: CPT

## 2024-09-18 PROCEDURE — C1889: CPT

## 2024-09-18 PROCEDURE — 71045 X-RAY EXAM CHEST 1 VIEW: CPT

## 2024-09-18 PROCEDURE — 85027 COMPLETE CBC AUTOMATED: CPT

## 2024-09-18 PROCEDURE — 83550 IRON BINDING TEST: CPT

## 2024-09-18 PROCEDURE — 86923 COMPATIBILITY TEST ELECTRIC: CPT

## 2024-09-18 PROCEDURE — 96375 TX/PRO/DX INJ NEW DRUG ADDON: CPT

## 2024-09-18 PROCEDURE — 87640 STAPH A DNA AMP PROBE: CPT

## 2024-09-18 PROCEDURE — 97116 GAIT TRAINING THERAPY: CPT

## 2024-09-18 PROCEDURE — 96365 THER/PROPH/DIAG IV INF INIT: CPT

## 2024-09-18 PROCEDURE — 99232 SBSQ HOSP IP/OBS MODERATE 35: CPT

## 2024-09-18 PROCEDURE — 36430 TRANSFUSION BLD/BLD COMPNT: CPT

## 2024-09-18 PROCEDURE — 88311 DECALCIFY TISSUE: CPT

## 2024-09-18 PROCEDURE — 73630 X-RAY EXAM OF FOOT: CPT

## 2024-09-18 PROCEDURE — 82272 OCCULT BLD FECES 1-3 TESTS: CPT

## 2024-09-18 PROCEDURE — 87040 BLOOD CULTURE FOR BACTERIA: CPT

## 2024-09-18 PROCEDURE — P9016: CPT

## 2024-09-18 PROCEDURE — 87070 CULTURE OTHR SPECIMN AEROBIC: CPT

## 2024-09-18 PROCEDURE — 80053 COMPREHEN METABOLIC PANEL: CPT

## 2024-09-18 PROCEDURE — 82607 VITAMIN B-12: CPT

## 2024-09-18 PROCEDURE — 84466 ASSAY OF TRANSFERRIN: CPT

## 2024-09-18 PROCEDURE — 85730 THROMBOPLASTIN TIME PARTIAL: CPT

## 2024-09-18 PROCEDURE — 87077 CULTURE AEROBIC IDENTIFY: CPT

## 2024-09-18 PROCEDURE — 87635 SARS-COV-2 COVID-19 AMP PRB: CPT

## 2024-09-18 PROCEDURE — 93926 LOWER EXTREMITY STUDY: CPT

## 2024-09-18 PROCEDURE — 99285 EMERGENCY DEPT VISIT HI MDM: CPT | Mod: 25

## 2024-09-18 PROCEDURE — 99024 POSTOP FOLLOW-UP VISIT: CPT

## 2024-09-18 PROCEDURE — 82962 GLUCOSE BLOOD TEST: CPT

## 2024-09-18 PROCEDURE — 82728 ASSAY OF FERRITIN: CPT

## 2024-09-18 PROCEDURE — 85610 PROTHROMBIN TIME: CPT

## 2024-09-18 PROCEDURE — 75635 CT ANGIO ABDOMINAL ARTERIES: CPT | Mod: MC

## 2024-09-18 PROCEDURE — 83735 ASSAY OF MAGNESIUM: CPT

## 2024-09-18 PROCEDURE — 84443 ASSAY THYROID STIM HORMONE: CPT

## 2024-09-18 PROCEDURE — 84145 PROCALCITONIN (PCT): CPT

## 2024-09-18 PROCEDURE — 86850 RBC ANTIBODY SCREEN: CPT

## 2024-09-18 PROCEDURE — 85025 COMPLETE CBC W/AUTO DIFF WBC: CPT

## 2024-09-18 PROCEDURE — 93005 ELECTROCARDIOGRAM TRACING: CPT

## 2024-09-18 PROCEDURE — 80162 ASSAY OF DIGOXIN TOTAL: CPT

## 2024-09-18 PROCEDURE — 86901 BLOOD TYPING SEROLOGIC RH(D): CPT

## 2024-09-18 PROCEDURE — 82746 ASSAY OF FOLIC ACID SERUM: CPT

## 2024-09-18 PROCEDURE — 93306 TTE W/DOPPLER COMPLETE: CPT

## 2024-09-18 PROCEDURE — 97162 PT EVAL MOD COMPLEX 30 MIN: CPT

## 2024-09-18 PROCEDURE — 87186 SC STD MICRODIL/AGAR DIL: CPT

## 2024-09-18 PROCEDURE — 84100 ASSAY OF PHOSPHORUS: CPT

## 2024-09-18 PROCEDURE — 83036 HEMOGLOBIN GLYCOSYLATED A1C: CPT

## 2024-09-18 PROCEDURE — 94640 AIRWAY INHALATION TREATMENT: CPT

## 2024-09-18 PROCEDURE — 85018 HEMOGLOBIN: CPT

## 2024-09-18 PROCEDURE — 87075 CULTR BACTERIA EXCEPT BLOOD: CPT

## 2024-09-18 PROCEDURE — 85014 HEMATOCRIT: CPT

## 2024-09-18 PROCEDURE — 88304 TISSUE EXAM BY PATHOLOGIST: CPT

## 2024-09-18 PROCEDURE — 36415 COLL VENOUS BLD VENIPUNCTURE: CPT

## 2024-09-18 PROCEDURE — 83540 ASSAY OF IRON: CPT

## 2024-09-18 RX ADMIN — Medication 100 MILLIGRAM(S): at 06:30

## 2024-09-18 RX ADMIN — SULFAMETHOXAZOLE AND TRIMETHOPRIM 1 TABLET(S): 800; 160 TABLET ORAL at 17:30

## 2024-09-18 RX ADMIN — TIOTROPIUM BROMIDE AND OLODATEROL 2 PUFF(S): 3.124; 2.736 SPRAY, METERED RESPIRATORY (INHALATION) at 06:30

## 2024-09-18 RX ADMIN — SUCRALFATE 1 GRAM(S): 1 SUSPENSION ORAL at 06:32

## 2024-09-18 RX ADMIN — SUCRALFATE 1 GRAM(S): 1 SUSPENSION ORAL at 17:31

## 2024-09-18 RX ADMIN — Medication 81 MILLIGRAM(S): at 13:37

## 2024-09-18 RX ADMIN — DIGOXIN 125 MICROGRAM(S): 0.12 TABLET ORAL at 06:32

## 2024-09-18 RX ADMIN — OXYCODONE HYDROCHLORIDE 10 MILLIGRAM(S): 5 TABLET ORAL at 06:30

## 2024-09-18 RX ADMIN — Medication 10 MILLIGRAM(S): at 13:43

## 2024-09-18 RX ADMIN — HYDROMORPHONE HYDROCHLORIDE 1 MILLIGRAM(S): 2 TABLET ORAL at 00:05

## 2024-09-18 RX ADMIN — OXYCODONE HYDROCHLORIDE 200 MILLIGRAM(S): 15 TABLET ORAL at 06:31

## 2024-09-18 RX ADMIN — NALOXEGOL OXALATE 25 MILLIGRAM(S): 25 TABLET, FILM COATED ORAL at 13:39

## 2024-09-18 RX ADMIN — FAMOTIDINE 20 MILLIGRAM(S): 10 INJECTION INTRAVENOUS at 13:36

## 2024-09-18 RX ADMIN — MIDODRINE HYDROCHLORIDE 15 MILLIGRAM(S): 5 TABLET ORAL at 17:31

## 2024-09-18 RX ADMIN — Medication 100 MILLIGRAM(S): at 13:36

## 2024-09-18 RX ADMIN — SULFAMETHOXAZOLE AND TRIMETHOPRIM 1 TABLET(S): 800; 160 TABLET ORAL at 06:33

## 2024-09-18 RX ADMIN — METOPROLOL TARTRATE 12.5 MILLIGRAM(S): 100 TABLET ORAL at 17:31

## 2024-09-18 RX ADMIN — METOPROLOL TARTRATE 12.5 MILLIGRAM(S): 100 TABLET ORAL at 06:31

## 2024-09-18 RX ADMIN — Medication 1000 UNIT(S): at 13:36

## 2024-09-18 RX ADMIN — Medication 2: at 17:31

## 2024-09-18 RX ADMIN — Medication 500 MILLIGRAM(S): at 13:37

## 2024-09-18 RX ADMIN — Medication 325 MILLIGRAM(S): at 06:32

## 2024-09-18 RX ADMIN — OXYCODONE HYDROCHLORIDE 10 MILLIGRAM(S): 5 TABLET ORAL at 17:31

## 2024-09-18 RX ADMIN — MIDODRINE HYDROCHLORIDE 15 MILLIGRAM(S): 5 TABLET ORAL at 06:32

## 2024-09-18 RX ADMIN — Medication 325 MILLIGRAM(S): at 17:31

## 2024-09-18 RX ADMIN — OXYCODONE HYDROCHLORIDE 10 MILLIGRAM(S): 5 TABLET ORAL at 07:00

## 2024-09-18 RX ADMIN — APIXABAN 5 MILLIGRAM(S): 5 TABLET, FILM COATED ORAL at 17:31

## 2024-09-18 RX ADMIN — Medication 1 TABLET(S): at 13:36

## 2024-09-18 RX ADMIN — APIXABAN 5 MILLIGRAM(S): 5 TABLET, FILM COATED ORAL at 06:31

## 2024-09-18 RX ADMIN — MIDODRINE HYDROCHLORIDE 15 MILLIGRAM(S): 5 TABLET ORAL at 13:36

## 2024-09-18 RX ADMIN — Medication 40 MILLIGRAM(S): at 06:30

## 2024-09-18 RX ADMIN — Medication 1: at 13:30

## 2024-09-18 NOTE — PROGRESS NOTE ADULT - PROBLEM SELECTOR PLAN 1
Patient evaluated and chart reviewed.  Discussed pot-op course and answered questions to patient's satisfaction.  Moderate drainage noted today on dressings.   Post-op hemoglobin has been closely monitored, and is now stable for wound VAC application.  VAC to be applied today set to 125mmHg continuous therapy.  Continue abx per ID recs.  Podiatry will continue to monitor closely.  Plan discussed with attending.

## 2024-09-18 NOTE — PROGRESS NOTE ADULT - PROBLEM SELECTOR PLAN 6
3units PRBC transfusion so far  serial CBC  heme eval with dr. fried
3units PRBC transfusion so far  serial CBC  heme eval with dr. fried
for 2nd unit PRBC today
for 3rd unit PRBC today
3units PRBC transfusion so far  serial CBC  heme eval with dr. fried appreciated
3units PRBC transfusion so far  serial CBC  heme eval
3units PRBC transfusion so far  serial CBC  heme eval with dr. fried appreciated
3units PRBC transfusion so far  serial CBC  heme eval with dr. fried appreciated

## 2024-09-18 NOTE — PROGRESS NOTE ADULT - PROBLEM SELECTOR PLAN 7
Dr Garcia=medication pended for approval, was prescribed for 2 months only . MyChart message sent. CSS=please call patient and assists with FU OV. LOV 7/13/19     Instructions         Return in about 1 month (around 8/12/2019).        Refill passed p
LMTCB to schedule f/u
Pended med changed to 90 tabs. Patient stated, \"It makes me feel better. \"    Please advise
Pt already has an appt scheduled on 9/30
Pt mde appt for f/u on 9/30.  Please advise
remote PCI  ASA for anti platelet therapy  ? repeat card cath in view of recent cardiac arrest  outpatient stress test per cardio
remote PCI  ASA for anti platelet therapy
remote PCI  ASA for anti platelet therapy  ? repeat card cath in view of recent cardiac arrest  outpatient stress test per cardio

## 2024-09-18 NOTE — SOCIAL WORK PROGRESS NOTE - NSSWPROGRESSNOTE_GEN_ALL_CORE
Discussed today at rounds and with MD who is clearing patient for discharge back to nursing home. Referral sent to [facility]. They can accept today and confirmed they can order wound vac. Ambulance requested thru [facility] [phone] for 6PM today. I met with patient at bedside and informed him. He declined to have me contact anyone. Nurse on unit aware of d/c time.

## 2024-09-18 NOTE — PROGRESS NOTE ADULT - ATTENDING COMMENTS
right infected heel wound with sepsis and AF discussed patient with cardiology he is currently not stable for OR he also is not decompensating requiring guillotine BKA   will await podiatry intervention and debridement should this not be adequate will then plan for amputation  will follow  continue care per ICU
54 year old male with a PMH of afib on Eliquis, CAD s/p stents, CVA, DM2, R heel OM s/p debridement, PE, chronic jacques who presented to the ED from wound care with severe sepsis from R heel osteomyelitis, complicated by uncontrolled afib.  Now improved.    --oxycodone and neurontin for pain control  --afib suboptimally controlled  continue amio, start maintenance digoxin today, continue lopressor  AC with lovenox  hypotension, cont midodrine to 15 q8  --stable respiratory status  COPD, continue tiotropium, olodaterol  --PO diet, continue bowel regimen  --normal renal function  hypokalemia, hypomag, replace  --R heel osteo, continue aztreonam  discuss plan with podiatry and vascular, pt wishes to hold off on amputation in favor of wound debridement at this point  --anemia of chronic disease, stable  --DM2, mild hypoglycemia, d/c ISS  --plan discussed with pt  --stable for tele
right foor infected gangrene pending podiatry intervention   per ICU and cardiology not cleared for OR  our role at this point is conservative if failure of debridement or further decompensation will need ramon CLEMENS
54 year old male with a PMH of afib on Eliquis, CAD s/p stents, CVA, DM2, R heel OM s/p debridement, PE, chronic jacques who presented to the ED from wound care with severe sepsis from R heel osteomyelitis, complicated by uncontrolled afib.      --mentating well  oxycodone and neurontin for pain control  --afib poorly controlled  continue amio  dig load in progress   if no improvement with 2nd dose digoxin then start low dose lopressor if BP allows  AC with lovenox  hypotension, increase midodrine to 15 q8  --stable respiratory status  --PO diet, continue bowel regimen  --normal renal function  aggressive electrolyte replacement  --R heel osteo, continue aztreonam  discuss plan with podiatry and vascular, pt wishes to hold off on amputation in favor of wound debridement at this point  --anemia, check FOBT, iron panel  no signs of bleeding  --DM2, mild hypolgycemia, may require D5  --plan discussed with pt
54 year old male with a PMH of afib on Eliquis, CAD s/p stents, CVA, DM2, R heel OM s/p debridement, PE, chronic jacques who presented to the ED from wound care with severe sepsis from R heel osteomyelitis, complicated by uncontrolled afib.      --mentating well  oxycodone and neurontin for pain control  --afib suboptimally controlled  continue amio, s/p dig load, check level in am  add low dose lopressor, much improved  AC with lovenox  hypotension, cont midodrine to 15 q8  --stable respiratory status  COPD, continue tiotropium, olodaterol  --PO diet, continue bowel regimen  --normal renal function  d/c IVF  --R heel osteo, continue aztreonam  discuss plan with podiatry and vascular, pt wishes to hold off on amputation in favor of wound debridement at this point  --anemia of chronic disease, now improved  --plan discussed with pt  --stable for tele
Patient was seen and evaluated   Patient's wound with warmth,  erythema, edema ,  drainage,   Applied silver alginate and sterile dry dressing to the right foot  Recommend IV antibiotics per infectious disease   Appreciate vascular consult   Procedure discussed at length with patient regarding risks, complications, benefits, as well as pre/intra/post-operative expectations. Patient verbally consents to procedure and understood discussion regarding procedure and all questions were answered to patient's satisfaction at this time.  Patient scheduled for right foot partial calcanectomy with wound debridement pending cardiac clearance  Discussed with patient that this is a limb salvage attempt and  that patient is still high risk for more proximal amputation, loss of limb, sepsis and loss of life.   Medical clearance appreciated  and request cardiac optimization   Please keep patient NPO after midnight for surgery scheduled tomorrow
Patient will benefit from wound vac will apply tomorrow
Advised for patient to follow up at the wound care center once a week   C/w wound vac and monitor hemoglobin and drainage from the wound vac.
Agreed as above
Patient seen at bed side. Patient alert and oriented  Drained about 5-6 cc of purulence from the distal wound bed side   Patient unstable for surgical intervention  Will continue with bed side wound debridement for source control. Tender to touch in the area of the wound   Vascular recommending BKA vs AKA  Patient wants to salvage the limb  Will need OR debridement and wash out once hemodynamically stable   Discussed with patient this will be a limb salvage attempt   Patient agreeable to the plan
Patient will benefit from wound vac once hgb is stabilized
Agreed as above
Right foot wound with hemostasis achieved, no acute bleeding or oozing noted   Will continue local wound care at this time   H&H still at 7.3   Patient will benefit from wound vac for the wound but only after hemoglobin is stable
No pulsatile bleeding noted   Mild strikethrough noted to the right heel   Patient may need transfusion, ordered H&H   Will continue to monitor wound   Keep heels offloaded with pillows under the calf at all times to prevent pressure injury

## 2024-09-18 NOTE — PROGRESS NOTE ADULT - NS ATTEND AMEND GEN_ALL_CORE FT
53 YO M with past medical history of AFib (on Eliquis), s/p cardiac arrest x 2 w/ AHRF (on recent admission), indwelling Aguilera, cerebral aneurysm, CAD (s/p stents), CVA, T2DM, HTN, OM (s/p debridement), PE, perforated gastric ulcer s/p ometnopexy 2019 with Dr. Meija who presents with possible gangrenous right foot. In ED pt found to be in afib with RVR and hypotension.    - Likely, in the setting of septic shock from gangrenous foot.   - S/p Cardizem gtt and Dig load.    - S/p Amiodarone PO load (5Gm).  Continue 200 mg PO qd  - tele w/ A fib  rate controlled   - Continue digoxin 125 pO QD.   - Continue metoprolol 12.5 QID. Plan to switch to long-acting    - Pt on Eliquis at home; was held for Podia procedure  - Would continue to hold as Hgb trending down and he has required 3 PRBCs this admission  -  Would repeat and transfuse as needed to keep Hgb ~8  - Hb has been stable.     - S/P partial calcanectomy of right foot, 9/10  - Follow Podia recs  - Abx per ID  - vascular now wants possible angiography  - would like to resume FD AC. though needs to be mindfull of Hb given recent sig anemia.     - Continue Midodrine 15mg TID for bp support  - Continue ASA and statin.
53 YO M with past medical history of AFib (on Eliquis), s/p cardiac arrest x 2 w/ AHRF (on recent admission), indwelling Aguilera, cerebral aneurysm, CAD (s/p stents), CVA, T2DM, HTN, OM (s/p debridement), PE, perforated gastric ulcer s/p ometnopexy 2019 with Dr. Mejia who presents with possible gangrenous right foot. In ED pt found to be in afib with RVR and hypotension.    - Likely, in the setting of septic shock from gangrenous foot.   - S/p Cardizem gtt and Dig load.    - S/p Amiodarone PO load (5Gm).  Continue 200 mg PO qd  - tele w/ A fib  rate controlled   - Continue digoxin 125 pO QD.   - Continue metoprolol 12.5 QID. Plan to switch to long-acting  - Pt on Eliquis at home; was held for Podia procedure  - Would continue to hold as Hgb trending down and he has required 3 PRBCs this admission  -  Would repeat and transfuse as needed to keep Hgb ~8  - Continue Midodrine 15mg TID for bp support  - Continue ASA and statin.      - S/P partial calcanectomy of right foot, 9/10  - Follow Podia recs  - Abx per ID
55 YO M with past medical history of AFib (on Eliquis), s/p cardiac arrest x 2 w/ AHRF (on recent admission), indwelling Aguilera, cerebral aneurysm, CAD (s/p stents), CVA, T2DM, HTN, OM (s/p debridement), PE, perforated gastric ulcer s/p ometnopexy 2019 with Dr. Mejia who presents with possible gangrenous right foot. In ED pt found to be in afib with RVR and hypotension.    - pt with septic shock from likely gangrenous foot.   - RRT 9/5 @1620 for rapid AF and hypotension  - s/p Cardizem 15 mg IV x 1, briefly on cardizem gtt (stopped by ICU), s/p Digoxin 500 mcg IVP x 1, then 250 x 2 on 9/6  - cont digoxin 125 pO QD as he remains in RVR  - cont low dose bb  - May eventually need to resume PO ccb  - amiodarone gtt started for rate control. now on oral load with 400 tid to 5 g followed by 200 qd  - c/w midodrine 15mg TID for bp support.      - pt on eliquis at home.   Now on full dose Lovenox     - EKG nonischemic  - Will need for optimization of HR prior to OR. Sepsis likely driving AF w rvr.   - Monitor and replete lytes, keep K>4, Mg>2.  - Will continue to follow.
53 YO M with past medical history of AFib (on Eliquis), s/p cardiac arrest x 2 w/ AHRF (on recent admission), indwelling Aguilera, cerebral aneurysm, CAD (s/p stents), CVA, T2DM, HTN, OM (s/p debridement), PE, perforated gastric ulcer s/p ometnopexy 2019 with Dr. Mejia who presents with possible gangrenous right foot. In ED pt found to be in afib with RVR and hypotension.    - S/p Cardizem gtt and Dig load.  Amiodarone PO load (5Gm)  - Continue  amio 200 mg PO qd  - Continue digoxin 125 pO QD.  Dig level = 1.1 (9/9)  - Continue metoprolol 12.5 QID. Plan to switch to long-acting  - Eliquis on hold, now on Lovenox, plan to switch to hep for possible angio either Monday or Tuesday per Vascular notes  - Continue Midodrine 15mg TID for bp support  - Continue ASA and statin.    - ? plan for angiogram with vascular,  timing TBD  - Pt has no active ischemia, decompensated heart failure, unstable arrythmia, or severe stenotic valvular disease. Patient is optimized from cardiovascular standpoint to proceed with planned procedure with routine hemodynamic monitoring.
53 YO M with past medical history of AFib (on Eliquis), s/p cardiac arrest x 2 w/ AHRF (on recent admission), indwelling Aguilera, cerebral aneurysm, CAD (s/p stents), CVA, T2DM, HTN, OM (s/p debridement), PE, perforated gastric ulcer s/p ometnopexy 2019 with Dr. Mejia who presents with possible gangrenous right foot. In ED pt found to be in afib with RVR and hypotension.    - S/p Cardizem gtt and Dig load.  Amiodarone PO load (5Gm)  - Continue amio 200 mg PO qd  - Continue digoxin 125 pO QD.  Dig level = 1.1 (9/9)  - Continue metoprolol 12.5 QID. Would switch to succinate 25mg BID.   - Eliquis on hold, was on Lovenox, now on hold for angio to be done today  - Continue Midodrine 15mg TID for bp support, wean as able  - Continue ASA and statin.    - LE edema is noted and he should get a dose of IV lasix but needs repeat BMP prior to this.   - Plan for LE angiogram today, no further cardiac testing needed.
55 YO M with past medical history of AFib (on Eliquis), s/p cardiac arrest x 2 w/ AHRF (on recent admission), indwelling Aguilera, cerebral aneurysm, CAD (s/p stents), CVA, T2DM, HTN, OM (s/p debridement), PE, perforated gastric ulcer s/p ometnopexy 2019 with Dr. Mejia who presents with possible gangrenous right foot. In ED pt found to be in afib with RVR and hypotension.    - Likely, in the setting of septic shock from gangrenous foot.   - S/p Cardizem gtt and Dig load.    - S/p Amiodarone PO load (5Gm).  Continue 200 mg PO qd  - tele w/ A fib  rate controlled   - Continue digoxin 125 pO QD.   - Continue metoprolol 12.5 QID. Plan to switch to long-acting  - Pt on Eliquis at home; was held for Podia procedure  - Would continue to hold as Hgb trending down and he has required 3 PRBCs this admission  -  Would repeat and transfuse as needed to keep Hgb ~8  - Continue Midodrine 15mg TID for bp support  - Continue ASA and statin.      - S/P partial calcanectomy of right foot, 9/10  - Follow Podia recs  - Abx per ID
55 YO M with past medical history of AFib (on Eliquis), s/p cardiac arrest x 2 w/ AHRF (on recent admission), indwelling Aguilera, cerebral aneurysm, CAD (s/p stents), CVA, T2DM, HTN, OM (s/p debridement), PE, perforated gastric ulcer s/p ometnopexy 2019 with Dr. Mejia who presents with possible gangrenous right foot. In ED pt found to be in afib with RVR and hypotension.    - S/p Cardizem gtt and Dig load.  Amiodarone PO load (5 Gm)  - Continue amio 200 mg PO qd  - Continue digoxin 125 pO QD.  Dig level = 1.1 (9/9)  - Continue metoprolol 12.5 QID. Would eventually switch to succinate 25mg BID.   - back on eliquis for ac  - Continue Midodrine 15mg TID for bp support, wean as able  - Continue ASA and statin.    - LE edema is noted, and unchanged
55 YO M with past medical history of AFib (on Eliquis), s/p cardiac arrest x 2 w/ AHRF (on recent admission), indwelling Aguilera, cerebral aneurysm, CAD (s/p stents), CVA, T2DM, HTN, OM (s/p debridement), PE, perforated gastric ulcer s/p ometnopexy 2019 with Dr. Mejia who presents with possible gangrenous right foot. In ED pt found to be in afib with RVR and hypotension.    - Likely, in the setting of septic shock from gangrenous foot.   - S/p Cardizem gtt and Dig load.    - S/p Amiodarone PO load (5Gm).  Continue 200 mg PO qd  - tele w/ A fib 60-90s, nonsustained 120s   - Continue digoxin 125 pO QD.  Dig level = 1.1 (9/9)  - Continue metoprolol 12.5 QID. Plan to switch to long-acting  - Pt on Eliquis at home; held for Podia procedure  - Would continue to hold as Hgb trending down.   -  Would repeat and transfuse as needed to keep Hgb ~8  - Continue Midodrine 15mg TID for bp support  - Continue ASA and statin.    - S/P partial calcanectomy of right foot, 9/10  - Follow Podia recs  - Abx per ID
55 YO M with past medical history of AFib (on Eliquis), s/p cardiac arrest x 2 w/ AHRF (on recent admission), indwelling Aguilera, cerebral aneurysm, CAD (s/p stents), CVA, T2DM, HTN, OM (s/p debridement), PE, perforated gastric ulcer s/p ometnopexy 2019 with Dr. Mejia who presents with possible gangrenous right foot. In ED pt found to be in afib with RVR and hypotension.    Afib with RVR  in the setting of septic shock from gangrenous foot.   S/p amiodarone gtt.  Continue PO load with 400 tid to a total of 5 g followed by 200 qd  S/p Cardizem gtt and Dig load as well  - cont dig, bb, now rate controlled  -holding ac for procedure, resume when able  -no acute ischemia or vol ol   -soft bp cont mido   - S/P bedside debridements by podiatry  - At this point, he is considered optimized to undergo a low risk non-cardiac procedure (right foot partial calcanectomy with wound debridement).  Routine hemodynamic monitoring recommended
55 YO M with past medical history of AFib (on Eliquis), s/p cardiac arrest x 2 w/ AHRF (on recent admission), indwelling Aguilera, cerebral aneurysm, CAD (s/p stents), CVA, T2DM, HTN, OM (s/p debridement), PE, perforated gastric ulcer s/p ometnopexy 2019 with Dr. Mejia who presents with possible gangrenous right foot. In ED pt found to be in afib with RVR and hypotension.    Afib with RVR  in the setting of septic shock from gangrenous foot.   S/p amiodarone gtt.  Continue PO load with 400 tid to a total of 5 g followed by 200 qd  S/p Cardizem gtt and Dig load as well  - cont dig, bb, now rate controlled  -holding ac for procedure, resume when able, hgb drop noted  - Needs full set of labs done  -no acute ischemia or vol ol   -soft bp cont mido   - S/P bedside debridements by podiatry
55 YO M with past medical history of AFib (on Eliquis), s/p cardiac arrest x 2 w/ AHRF (on recent admission), indwelling Aguilera, cerebral aneurysm, CAD (s/p stents), CVA, T2DM, HTN, OM (s/p debridement), PE, perforated gastric ulcer s/p ometnopexy 2019 with Dr. Mejia who presents with possible gangrenous right foot. In ED pt found to be in afib with RVR and hypotension.    - pt with septic shock from likely gangrenous foot.   - appears sig volume depleted  - agree with IVF  - RRT 9/5 @1620 for rapid AF and hypotension  - s/p Cardizem 15 mg IV x 1, briefly on cardizem gtt (stopped by ICU), s/p Digoxin 500 mcg IVP x 1  - HR remains uncontrolled, should complete dig load with 0.25 q6hrs x 2 doses to complete 1 mg dose  - amiodarone gtt started for rate control. given pts hypotension can't use AV yung blockers, will use amio  - if bp recovers would resume ccb or bb  -c/w midodrine 5mg TID for bp support.   - c/w heparin gtt in anticipation for procedure   - Will need for optimization of HR prior to OR. Sepsis likely driving AF w rvr.

## 2024-09-18 NOTE — PROGRESS NOTE ADULT - ASSESSMENT
53 YO M with past medical history of AFib (on Eliquis), s/p cardiac arrest x 2 w/ AHRF (on recent admission), indwelling Aguilera, cerebral aneurysm, CAD (s/p stents), CVA, T2DM, HTN, OM (s/p debridement), PE, perforated gastric ulcer s/p ometnopexy 2019 with Dr. Mejia who presents with possible gangrenous right foot. In ED pt found to be in afib with RVR and hypotension.    Afib with RVR, CAD, HTN   - Likely, in the setting of septic shock from gangrenous foot.   - S/p Cardizem gtt and Dig load.  Amiodarone PO load (5Gm)  - Continue  amio 200 mg PO qd  - Continue digoxin 125 pO QD.  Dig level = 1.1 (9/9)  - Continue metoprolol 12.5 QID. Plan to switch to long-acting  - Eliquis on hold, now on Heparin gtt, for possible angio vs toe amp, pending plan     - TTE: normal LVF with mod-severe MR,  increased LV mass and concentric hypertrophy, severely dilated LA and RA,  borderline pulmonary hypertension.  - No evidence of volume overload other than LE edema.   - Would trial with Lasix 40 mg IV x 1 after angiogram but needs repeat BMP first.     - BP stable and controlled, HR controlled  - Continue Midodrine 15mg TID for bp support, wean as able (Hx of orthostatic hypotension)  - continue BB with hold parameters  - Monitor and replete Lytes. Keep K > 4 and Mg > 2    - With known Hx of CAD.    - EKG nonischemic  - No evidence of any active ischemia   - Continue ASA and statin.      - S/P partial calcanectomy of right foot, 9/10, pending path, Podia following   - Abx per ID  - Eliquis on hold, now on Heparin gtt, for angio 9/17 per Vascular notes  - Pt has no active ischemia, decompensated heart failure, unstable arrythmia, or severe stenotic valvular disease. Patient is optimized from cardiovascular standpoint to proceed with planned procedure with routine hemodynamic monitoring.   - Continue to trend and transfuse as needed to keep Hgb ~8    - Will continue to follow.    SANTI Graham  Nurse Practitioner - Cardiology   call TEAMS   55 YO M with past medical history of AFib (on Eliquis), s/p cardiac arrest x 2 w/ AHRF (on recent admission), indwelling Aguilera, cerebral aneurysm, CAD (s/p stents), CVA, T2DM, HTN, OM (s/p debridement), PE, perforated gastric ulcer s/p ometnopexy 2019 with Dr. Mejia who presents with possible gangrenous right foot. In ED pt found to be in afib with RVR and hypotension.    Afib with RVR, CAD, HTN   - has known hx of afib, his RVR episode likely, in the setting of septic shock from gangrenous foot.  - S/p Cardizem gtt.  Dig and Amiodarone PO load (5Gm)  - his rates has been controlled per flow sheet  - BP stable and controlled   - Continue Midodrine 15mg TID for bp support, wean as able (Hx of orthostatic hypotension)  - Continue amio 200 mg PO qd  - Continue digoxin 125 pO QD.  Dig level = 1.1 (9/9)  - Continue metoprolol 12.5 QID. Plan to switch to long-acting  - continue Eliquis, pending clinical course and decision re: vascular intervention can hold and start on Heparin gtt  - Monitor and replete Lytes. Keep K > 4 and Mg > 2    - TTE: normal LVF with mod-severe MR,  increased LV mass and concentric hypertrophy, severely dilated LA and RA,  borderline pulmonary hypertension.  - No evidence of volume overload other than LE edema.     - With known Hx of CAD.    - EKG nonischemic  - No evidence of any active ischemia   - Continue ASA and statin.      - S/P partial calcanectomy of right foot, 9/10, pending path, Podia following   - Abx per ID    - Pt has no active ischemia, decompensated heart failure, unstable arrythmia, or severe stenotic valvular disease. Patient is optimized from cardiovascular standpoint to proceed with planned procedure with routine hemodynamic monitoring.   - Continue to trend and transfuse as needed to keep Hgb ~8    - Will continue to follow.    Jennifer Gudino [facility]  Nurse Practitioner - Cardiology   call TEAMS

## 2024-09-18 NOTE — PROGRESS NOTE ADULT - PROBLEM SELECTOR PLAN 4
insulin scale  endo eval with dr. perlman appreciated  labile FS
insulin scale  endo eval with dr. perlman appreciated
insulin scale  endo eval
insulin scale  endo eval with dr. perlman
insulin scale  endo eval with dr. perlman appreciated  labile FS
insulin scale  endo eval with dr. perlman appreciated  labile FS
insulin scale
insulin scale  endo eval with dr. perlman appreciated  labile FS
insulin scale  endo eval with dr. perlman
insulin scale  endo eval with dr. perlman appreciated  labile FS
insulin scale  endo eval with dr. perlman appreciated  labile FS
insulin scale

## 2024-09-18 NOTE — DISCHARGE NOTE NURSING/CASE MANAGEMENT/SOCIAL WORK - NSDCVIVACCINE_GEN_ALL_CORE_FT
pneumococcal polysaccharide PPV23; 31-Oct-2019 13:45; Gabbie Andrew (RN); [employer] [id number] (Exp. Date: 07-Aug-2020); IntraMuscular; Deltoid Right.; 0.5 milliLiter(s); VIS (VIS Published: 06-Oct-2009, VIS Presented: 31-Oct-2019);

## 2024-09-18 NOTE — PROGRESS NOTE ADULT - PROBLEM SELECTOR PLAN 2
ABX - bactrim per ID  ID eval with dr. edyta bello appreciated  improving
IV ABX per ID  ID eval with dr. edyta bello appreciated  improving
ABX - bactrim per ID  ID eval with dr. edyta bello appreciated  improving
IV ABX per ID  ID eval with dr. edyta moreira
ABX per ID  ID eval with dr. edyta bello appreciated  improving
IV ABX per ID  ID eval with dr. edyta bello appreciated  improving
IV ABX per ID  ID eval with dr. edyta bello appreciated  improving
ABX - bactrim per ID  ID eval with dr. edyta bello appreciated  improving
ABX per ID  ID eval with dr. edyta bello appreciated  improving
IV ABX per ID  ID eval with dr. lan
ABX - bactrim per ID  ID eval with dr. edyta bello appreciated  improving
ABX - bactrim per ID  ID eval with dr. edyta bello appreciated  improving

## 2024-09-18 NOTE — PROGRESS NOTE ADULT - PROBLEM SELECTOR PROBLEM 1
Type 2 diabetes mellitus
Type 2 diabetes mellitus
Diabetic ulcer of right heel
Type 2 diabetes mellitus
Diabetic ulcer of right heel

## 2024-09-18 NOTE — PROGRESS NOTE ADULT - PROBLEM SELECTOR PROBLEM 5
Hypotension

## 2024-09-18 NOTE — PROGRESS NOTE ADULT - PROBLEM SELECTOR PLAN 5
IVF   S/P pressor support  labile  improving  midodrine
IVF   S/P pressor support  labile  improving  midodrine
IVF   S/P pressor support  labile  improving
IVF   S/P pressor support  labile  improving
IVF   pressor support  labile
IVF   pressor support  labile
IVF   S/P pressor support  labile  improving  midodrine
IVF   S/P pressor support  labile  improving
IVF   S/P pressor support  labile  improving  midodrine
IVF   S/P pressor support  labile  improving

## 2024-09-18 NOTE — PROGRESS NOTE ADULT - PROBLEM SELECTOR PROBLEM 2
Acute sepsis

## 2024-09-18 NOTE — PROGRESS NOTE ADULT - PROBLEM SELECTOR PLAN 3
rate control with dig and amiodarone  AC on hold for possible surgical debridement  cardio eval
rate control with dig and amiodarone  AC on hold for possible surgical debridement
rate control with dig and amiodarone and BB  cardio eval with dr. rashad bello appreciated  restart full anticoagulation for now, in setting of acute blood loss anemia  CCB on hold  will switch AC to heparin from lovenox
rate control with dig and amiodarone  AC on hold for possible surgical debridement  cardio eval with dr. solorzano  holding off full anticoagulation for now, in setting of acute blood loss anemia
rate control with dig and amiodarone  AC on hold for possible surgical debridement  cardio eval with dr. solorzano
rate control with dig and amiodarone and BB  cardio eval with dr. rashad bello appreciated  restart full anticoagulation for now, in setting of acute blood loss anemia  CCB on hold
rate control with dig and amiodarone  AC on hold for possible surgical debridement
rate control with dig and amiodarone  AC on hold for possible surgical debridement  cardio eval with dr. rashad bello   holding off full anticoagulation for now, in setting of acute blood loss anemia
rate control with dig and amiodarone and BB  cardio eval with dr. rashad bello appreciated  restart full anticoagulation for now, in setting of acute blood loss anemia  CCB on hold  will switch AC to heparin from lovenox ( vascular request )
rate control with dig and amiodarone  cardio eval with dr. rashad bello appreciated  restart full anticoagulation for now, in setting of acute blood loss anemia  [facility]B on hold
rate control with dig and amiodarone and BB  cardio eval with dr. rashad bello appreciated  restart full anticoagulation for now, in setting of acute blood loss anemia  CCB on hold  will switch AC to heparin from lovenox ( vascular request )
rate control with dig and amiodarone and BB  cardio eval with dr. rashad bello appreciated  restart full anticoagulation for now, in setting of acute blood loss anemia  [facility]B on hold

## 2024-09-18 NOTE — PROGRESS NOTE ADULT - SUBJECTIVE AND OBJECTIVE BOX
Patient is a 54y old  Male who presents with a chief complaint of AF (18 Sep 2024 10:33)  feels well, without complaints      INTERVAL HPI/OVERNIGHT EVENTS:  T(C): 36.8 (09-18-24 @ 12:00), Max: 36.9 (09-17-24 @ 21:17)  HR: 48 (09-18-24 @ 12:00) (48 - 98)  BP: 116/78 (09-18-24 @ 12:00) (102/60 - 117/72)  RR: 16 (09-18-24 @ 12:00) (16 - 18)  SpO2: 95% (09-18-24 @ 12:00) (95% - 95%)  Wt(kg): --  I&O's Summary    17 Sep 2024 07:01  -  18 Sep 2024 07:00  --------------------------------------------------------  IN: 0 mL / OUT: 1600 mL / NET: -1600 mL    18 Sep 2024 07:01  -  18 Sep 2024 13:19  --------------------------------------------------------  IN: 0 mL / OUT: 700 mL / NET: -700 mL        LABS:                        9.0    6.99  )-----------( 379      ( 18 Sep 2024 06:20 )             28.6           PTT - ( 17 Sep 2024 22:37 )  PTT:53.6 sec    CAPILLARY BLOOD GLUCOSE      POCT Blood Glucose.: 159 mg/dL (18 Sep 2024 12:29)  POCT Blood Glucose.: 142 mg/dL (18 Sep 2024 08:02)  POCT Blood Glucose.: 148 mg/dL (17 Sep 2024 21:28)  POCT Blood Glucose.: 132 mg/dL (17 Sep 2024 17:04)            MEDICATIONS  (STANDING):  aMIOdarone    Tablet 200 milliGRAM(s) Oral daily  aMIOdarone    Tablet   Oral   apixaban 5 milliGRAM(s) Oral two times a day  ascorbic acid 500 milliGRAM(s) Oral daily  aspirin enteric coated 81 milliGRAM(s) Oral daily  atorvastatin 40 milliGRAM(s) Oral at bedtime  cetirizine 10 milliGRAM(s) Oral daily  cholecalciferol 1000 Unit(s) Oral daily  dextrose 5%. 1000 milliLiter(s) (100 mL/Hr) IV Continuous <Continuous>  dextrose 5%. 1000 milliLiter(s) (50 mL/Hr) IV Continuous <Continuous>  dextrose 50% Injectable 25 Gram(s) IV Push once  dextrose 50% Injectable 12.5 Gram(s) IV Push once  dextrose 50% Injectable 25 Gram(s) IV Push once  dextrose Oral Gel 15 Gram(s) Oral once  digoxin     Tablet 125 MICROGram(s) Oral daily  famotidine    Tablet 20 milliGRAM(s) Oral daily  ferrous    sulfate 325 milliGRAM(s) Oral two times a day  gabapentin 100 milliGRAM(s) Oral three times a day  glucagon  Injectable 1 milliGRAM(s) IntraMuscular once  insulin lispro (ADMELOG) corrective regimen sliding scale   SubCutaneous three times a day before meals  insulin lispro (ADMELOG) corrective regimen sliding scale   SubCutaneous at bedtime  melatonin 5 milliGRAM(s) Oral at bedtime  metoprolol tartrate 12.5 milliGRAM(s) Oral every 6 hours  midodrine. 15 milliGRAM(s) Oral three times a day  multivitamin 1 Tablet(s) Oral daily  mupirocin 2% Nasal 1 Application(s) Both Nostrils two times a day  naloxegol 25 milliGRAM(s) Oral daily  oxyCODONE  ER Tablet 10 milliGRAM(s) Oral every 12 hours  pantoprazole    Tablet 40 milliGRAM(s) Oral before breakfast  polyethylene glycol 3350 17 Gram(s) Oral daily  senna 2 Tablet(s) Oral at bedtime  sucralfate 1 Gram(s) Oral two times a day  tiotropium 2.5 MICROgram(s)/olodaterol 2.5 MICROgram(s) (STIOLTO) Inhaler 2 Puff(s) Inhalation daily  trimethoprim  160 mG/sulfamethoxazole 800 mG 1 Tablet(s) Oral two times a day    MEDICATIONS  (PRN):  acetaminophen     Tablet .. 650 milliGRAM(s) Oral every 6 hours PRN Temp greater or equal to 38C (100.4F)  oxyCODONE    IR 2.5 milliGRAM(s) Oral every 4 hours PRN Moderate Pain (4 - 6)  sodium chloride 0.65% Nasal 1 Spray(s) Both Nostrils daily PRN Nasal Congestion  traMADol 50 milliGRAM(s) Oral every 4 hours PRN Mild Pain (1 - 3)      REVIEW OF SYSTEMS:  CONSTITUTIONAL: No fever, weight loss, or fatigue  EYES: No eye pain, visual disturbances, or discharge  ENMT:  No difficulty hearing, tinnitus, vertigo; No sinus or throat pain  NECK: No pain or stiffness  RESPIRATORY: No cough, wheezing, chills or hemoptysis; No shortness of breath  CARDIOVASCULAR: No chest pain, palpitations, dizziness, or leg swelling  GASTROINTESTINAL: No abdominal or epigastric pain. No nausea, vomiting, or hematemesis; No diarrhea or constipation. No melena or hematochezia.  GENITOURINARY: No dysuria, frequency, hematuria, or incontinence  NEUROLOGICAL: No headaches, memory loss, loss of strength, numbness, or tremors  SKIN: No itching, burning, rashes, or lesions   LYMPH NODES: No enlarged glands  ENDOCRINE: No heat or cold intolerance; No hair loss  MUSCULOSKELETAL: No joint pain or swelling; No muscle, back, or extremity pain  PSYCHIATRIC: No depression, anxiety, mood swings, or difficulty sleeping  HEME/LYMPH: No easy bruising, or bleeding gums  ALLERY AND IMMUNOLOGIC: No hives or eczema    RADIOLOGY & ADDITIONAL TESTS:    Imaging Personally Reviewed:  [x ] YES  [ ] NO    Consultant(s) Notes Reviewed:  [x ] YES  [ ] NO    PHYSICAL EXAM:  GENERAL: NAD, well-groomed, well-developed  HEAD:  Atraumatic, Normocephalic  EYES: EOMI, PERRLA, conjunctiva and sclera clear  ENMT: No tonsillar erythema, exudates, or enlargement; Moist mucous membranes, Good dentition, No lesions  NECK: Supple, No JVD, Normal thyroid  NERVOUS SYSTEM:  Alert & Oriented X3, Good concentration; Motor Strength 5/5 B/L upper and lower extremities; DTRs 2+ intact and symmetric  CHEST/LUNG: Clear to percussion bilaterally; No rales, rhonchi, wheezing, or rubs  HEART: Regular rate and rhythm; No murmurs, rubs, or gallops  ABDOMEN: Soft, Nontender, Nondistended; Bowel sounds present  EXTREMITIES:  2+ Peripheral Pulses, No clubbing, cyanosis, or edema  LYMPH: No lymphadenopathy noted  SKIN: No rashes or lesions    Care Discussed with Consultants/Other Providers [x ] YES  [ ] NO    advance care planning and advance directives discussed, inlcuding but not limited to long term care plannning [x]YES

## 2024-09-18 NOTE — PHYSICAL THERAPY INITIAL EVALUATION ADULT - ASSISTIVE DEVICE FOR TRANSFER: STAND/SIT, REHAB EVAL
Assessment/Plan   Diagnoses and all orders for this visit:  Epiretinal membrane (ERM) of left eye  -     OCT, Retina - OU - Both Eyes  Cystoid macular edema of left eye         Impression          1 H35.372 Epiretinal membrane (erm) of left eye-Improving     2 H35.352 Cystoid macular edema, left eye-Worsening                         Discussion      Epiretinal Membrane, OS mnth 4 PPV MP OS good results   CME OS resolved   off PF      ses 20 /25 sc                             Hi quality OCT  scans obtained   signal good      OCT OD - Normal Foveal Contour, No Edema, IS/OS Junction Normal   OCT OS - Normal Foveal Contour, No Edema, IS/OS Junction Normal      additional commnents: created by:Porter Conklin   rolling walker

## 2024-09-18 NOTE — PROGRESS NOTE ADULT - PROVIDER SPECIALTY LIST ADULT
Cardiology
Family Medicine
Infectious Disease
Podiatry
Podiatry
Vascular Surgery
Vascular Surgery
Cardiology
Critical Care
Critical Care
Hospitalist
Infectious Disease
Infectious Disease
Podiatry
Podiatry
Vascular Surgery
Cardiology
Critical Care
Heme/Onc
Infectious Disease
Infectious Disease
Internal Medicine
Podiatry
Vascular Surgery
Cardiology
Cardiology
Endocrinology
Heme/Onc
Infectious Disease
Endocrinology
Family Medicine
Podiatry
Endocrinology
Family Medicine
Family Medicine
Hospitalist
Podiatry
Family Medicine

## 2024-09-18 NOTE — PROGRESS NOTE ADULT - REASON FOR ADMISSION
AF

## 2024-09-18 NOTE — PROGRESS NOTE ADULT - SUBJECTIVE AND OBJECTIVE BOX
[facility] -- Brittany Lutz Pannella, Patel, Savella, Goodger, Cohen  Office # [phone]    Follow Up:  Cardiac optimization     Subjective/Observations:     REVIEW OF SYSTEMS: All other review of systems is negative unless indicated above  PAST MEDICAL & SURGICAL HISTORY:  Diabetes      Diabetes mellitus with no complication      Afib      Hypertension      BPH (benign prostatic hyperplasia)      Perforated gastric ulcer  s/p emergent ex-lap omentopexy and plication 6/2019      Pulmonary embolism      History of non-ST elevation myocardial infarction (NSTEMI)      Osteomyelitis  s/p debridement      CAD S/P percutaneous coronary angioplasty      Cerebrovascular accident      H/O abdominal surgery      Perforated gastric ulcer      Traumatic amputation of left foot, initial encounter        MEDICATIONS  (STANDING):  aMIOdarone    Tablet 200 milliGRAM(s) Oral daily  aMIOdarone    Tablet   Oral   apixaban 5 milliGRAM(s) Oral two times a day  ascorbic acid 500 milliGRAM(s) Oral daily  aspirin enteric coated 81 milliGRAM(s) Oral daily  atorvastatin 40 milliGRAM(s) Oral at bedtime  cetirizine 10 milliGRAM(s) Oral daily  cholecalciferol 1000 Unit(s) Oral daily  dextrose 5%. 1000 milliLiter(s) (100 mL/Hr) IV Continuous <Continuous>  dextrose 5%. 1000 milliLiter(s) (50 mL/Hr) IV Continuous <Continuous>  dextrose 50% Injectable 25 Gram(s) IV Push once  dextrose 50% Injectable 12.5 Gram(s) IV Push once  dextrose 50% Injectable 25 Gram(s) IV Push once  dextrose Oral Gel 15 Gram(s) Oral once  digoxin     Tablet 125 MICROGram(s) Oral daily  famotidine    Tablet 20 milliGRAM(s) Oral daily  ferrous    sulfate 325 milliGRAM(s) Oral two times a day  gabapentin 100 milliGRAM(s) Oral three times a day  glucagon  Injectable 1 milliGRAM(s) IntraMuscular once  insulin lispro (ADMELOG) corrective regimen sliding scale   SubCutaneous three times a day before meals  insulin lispro (ADMELOG) corrective regimen sliding scale   SubCutaneous at bedtime  melatonin 5 milliGRAM(s) Oral at bedtime  metoprolol tartrate 12.5 milliGRAM(s) Oral every 6 hours  midodrine. 15 milliGRAM(s) Oral three times a day  multivitamin 1 Tablet(s) Oral daily  mupirocin 2% Nasal 1 Application(s) Both Nostrils two times a day  naloxegol 25 milliGRAM(s) Oral daily  oxyCODONE  ER Tablet 10 milliGRAM(s) Oral every 12 hours  pantoprazole    Tablet 40 milliGRAM(s) Oral before breakfast  polyethylene glycol 3350 17 Gram(s) Oral daily  senna 2 Tablet(s) Oral at bedtime  sucralfate 1 Gram(s) Oral two times a day  tiotropium 2.5 MICROgram(s)/olodaterol 2.5 MICROgram(s) (STIOLTO) Inhaler 2 Puff(s) Inhalation daily  trimethoprim  160 mG/sulfamethoxazole 800 mG 1 Tablet(s) Oral two times a day    MEDICATIONS  (PRN):  acetaminophen     Tablet .. 650 milliGRAM(s) Oral every 6 hours PRN Temp greater or equal to 38C (100.4F)  oxyCODONE    IR 2.5 milliGRAM(s) Oral every 4 hours PRN Moderate Pain (4 - 6)  sodium chloride 0.65% Nasal 1 Spray(s) Both Nostrils daily PRN Nasal Congestion  traMADol 50 milliGRAM(s) Oral every 4 hours PRN Mild Pain (1 - 3)    Allergies    fish (Hives)  ertapenem (Blisters; Rash)    Intolerances      Vital Signs Last 24 Hrs  T(C): 36.4 (18 Sep 2024 05:14), Max: 36.9 (17 Sep 2024 11:34)  T(F): 97.5 (18 Sep 2024 05:14), Max: 98.5 (17 Sep 2024 21:17)  HR: 76 (18 Sep 2024 05:14) (70 - 98)  BP: 102/60 (18 Sep 2024 05:14) (102/60 - 117/72)  BP(mean): --  RR: 18 (17 Sep 2024 23:15) (17 - 18)  SpO2: 95% (17 Sep 2024 23:15) (95% - 95%)    Parameters below as of 17 Sep 2024 23:15  Patient On (Oxygen Delivery Method): room air      I&O's Summary    17 Sep 2024 07:01  -  18 Sep 2024 07:00  --------------------------------------------------------  IN: 0 mL / OUT: 1600 mL / NET: -1600 mL          TELE:   PHYSICAL EXAM:  Constitutional: NAD, awake and alert  HEENT: Moist Mucous Membranes, Anicteric  Pulmonary: Non-labored, breath sounds are clear bilaterally, No wheezing, rales or rhonchi  Cardiovascular: IRRRegular, S1 and S2, No murmurs, rubs, gallops or clicks  Gastrointestinal: Bowel Sounds present, soft, nontender.   Lymph: No peripheral edema. No lymphadenopathy.  Skin: No visible rashes or ulcers.  Psych:  Mood & affect appropriate  LABS: All Labs Reviewed:                        8.8    8.98  )-----------( 341      ( 17 Sep 2024 14:13 )             27.4                         9.3    10.38 )-----------( 362      ( 17 Sep 2024 06:46 )             28.7                         8.7    9.33  )-----------( 304      ( 15 Sep 2024 11:59 )             28.7     15 Sep 2024 11:59    137    |  108    |  10     ----------------------------<  146    4.2     |  21     |  0.77     Ca    8.0        15 Sep 2024 11:59    TPro  4.9    /  Alb  1.5    /  TBili  0.3    /  DBili  x      /  AST  21     /  ALT  22     /  AlkPhos  92     15 Sep 2024 11:59    PTT - ( 17 Sep 2024 22:37 )  PTT:53.6 sec          TRANSTHORACIC ECHOCARDIOGRAM REPORT  ________________________________________________________________________________                                      _______       Pt. Name:       SARAH MORRISON Study Date:    9/5/2024  MRN:            [id number]         YOB: 1970  Accession #:    [id number]        Age:           54 years  Account#:       [id number]       Gender:        M  Heart Rate:                      Height:        74.02 in (188.00 cm)  Rhythm:                          Weight:        207.23 lb (94.00 kg)  Blood Pressure: 97/60 mmHg       BSA/BMI:       2.21 m² / 26.60 kg/m²  ________________________________________________________________________________________  Referring Physician:    [id number] Hill Brizuela  Interpreting Physician: Claudette Jacobs  Primary Sonographer:    Mounika FELDMAN    CPT:               ECHO TTE WO CON COMP W DOPP - 89669.m  Indication(s):     Abnormal electrocardiogram ECG/EKG - R94.31  Procedure:         Transthoracic echocardiogram with2-D, M-mode and complete                     spectral and color flow Doppler.  Ordering Location: [facility]  Admission Status:  Inpatient  Study Information: Image quality for this study is technically difficult.    _______________________________________________________________________________________     CONCLUSIONS:      1. Technically difficult image quality.   2. Left ventricular cavity is normal in size. Left ventricular wall thickness is normal. Left ventricular systolic function is normal withan ejection fraction of 69 % by Castañeda's method of disks. There are no regional wall motion abnormalities seen.   3. There is increased LV mass and concentric hypertrophy.   4. Normal right ventricular cavity size and normal right ventricular systolic function.   5. Left atrium is severely dilated.   6. The right atrium is severely dilated.   7. There is calcification of the mitral valve annulus.   8. Mitral valve leaflets are diffusely calcified.   9. Moderate to severe mitral regurgitation.  10. Trileaflet aortic valve with normal systolic excursion. There is calcification of the aortic valve leaflets.  11. Mild tricuspid regurgitation.  12. Estimated pulmonary artery systolic pressure is 39 mmHg, consistent with borderline pulmonary hypertension.  13. The inferior vena cava is normal in size measuring 1.75 cm in diameter, (normal <2.1cm) with normal inspiratory collapse (normal >50%) consistent with normal right atrial pressure (~3, range 0-5mmHg).    ________________________________________________________________________________________  FINDINGS:     Left Ventricle:  The left ventricular cavity is normal in size. Left ventricular wall thickness is normal. Left ventricular systolic function is normal with a calculated ejection fraction of 69 % by the Castañeda's biplane method of disks. There are no regional wall motion abnormalities seen. There is increased LV mass and concentric hypertrophy. The left ventricular diastolic function is indeterminate.     Right Ventricle:  The right ventricular cavity is normal in size and right ventricular systolic function is normal.     Left Atrium:  The left atrium is severely dilated with an indexed volume of 31.95 ml/m².     Right Atrium:  The right atrium is severely dilated with an indexed volume of 26.74 ml/m².     Aortic Valve:  The aortic valve appears trileaflet with normal systolic excursion. There is calcification of the aortic valve leaflets.     Mitral Valve:  There is calcification of the mitral valve annulus. Mitral valve leaflets are diffusely calcified. There is moderate to severe mitral regurgitation.     Tricuspid Valve:  Structurally normal tricuspid valve with normal leaflet excursion. There is mild tricuspid regurgitation. Estimated pulmonary artery systolicpressure is 39 mmHg, consistent with borderline pulmonary hypertension.     Pulmonic Valve:  The pulmonic valve was not well visualized. There is trace pulmonic regurgitation.     Systemic Veins:  The inferior vena cava is normal in size measuring 1.75 cm in diameter, (normal <2.1cm) with normal inspiratory collapse (normal >50%) consistent with normal right atrial pressure (~3, range 0-5mmHg).  ____________________________________________________________________  QUANTITATIVE DATA:  Left Ventricle Measurements: (Indexed to BSA)     IVSd (2D):   1.4 cm  LVPWd (2D):  1.3 cm  LVIDd (2D):  5.4 cm  LVIDs (2D):  4.0 cm  LV Mass:     306 g  138.7 g/m²  LV Vol d, MOD A2C: 87.4 ml 39.62 ml/m²  LV Vol d, MOD A4C: 81.2 ml 36.78 ml/m²  LV Vol d, MOD BP: 92.3 ml 41.82 ml/m²  LV Vol s, MOD A2C: 29.0 ml 13.13 ml/m²  LV Vol s, MOD A4C: 28.3 ml 12.82 ml/m²  LV Vol s, MOD BP:  28.9 ml 13.08 ml/m²  LVOT SV MOD BP:    63.4 ml  LV EF% MOD BP:     69 %     MV E Vmax:    1.19 m/s  e' lateral:   13.10 cm/s  e'medial:    10.70 cm/s  E/e' lateral: 9.05  E/e' medial:  12.00  E/e' Average: 9.96  MV DT:        148 msec    Aorta Measurements: (Normal range) (Indexed to BSA)     Ao Root d 3.23 cm (3.1 - 3.7 cm) 1.46 cm/m²            Left Atrium Measurements: (Indexed to BSA)  LA Diam 2D: 4.72 cm         Right Atrial Measurements:     RA Vol:       59.00 ml  RA Vol Index: 26.74 ml/m²       LVOT / RVOT/ Qp/Qs Data: (Indexed to BSA)  LVOT Diameter: 2.29 cm  LVOT Area:     4.12 cm²    Mitral Valve Measurements:     MV E Vmax: 1.2 m/s       Tricuspid Valve Measurements:     TR Vmax:          3.0 m/s  TR Peak Gradient: 35.9 mmHg  RA Pressure:      3 mmHg  PASP:             39 mmHg    ________________________________________________________________________________________  Electronically signed on 9/6/2024 at 10:38:59 AM by Claudette Jacobs         *** Final ***      [facility] -- Brittany Lutz Pannella, Patel, Savella, Goodger, Cohen  Office # [phone]    Follow Up:  Cardiac optimization     Subjective/Observations: seen and examined, awake, alert, resting comfortably in bed, denies chest pain, dyspnea, palpitations or dizziness, orthopnea and PND. Tolerating room air. no events overnight     REVIEW OF SYSTEMS: All other review of systems is negative unless indicated above  PAST MEDICAL & SURGICAL HISTORY:  Diabetes      Diabetes mellitus with no complication      Afib      Hypertension      BPH (benign prostatic hyperplasia)      Perforated gastric ulcer  s/p emergent ex-lap omentopexy and plication 6/2019      Pulmonary embolism      History of non-ST elevation myocardial infarction (NSTEMI)      Osteomyelitis  s/p debridement      CAD S/P percutaneous coronary angioplasty      Cerebrovascular accident      H/O abdominal surgery      Perforated gastric ulcer      Traumatic amputation of left foot, initial encounter        MEDICATIONS  (STANDING):  aMIOdarone    Tablet 200 milliGRAM(s) Oral daily  aMIOdarone    Tablet   Oral   apixaban 5 milliGRAM(s) Oral two times a day  ascorbic acid 500 milliGRAM(s) Oral daily  aspirin enteric coated 81 milliGRAM(s) Oral daily  atorvastatin 40 milliGRAM(s) Oral at bedtime  cetirizine 10 milliGRAM(s) Oral daily  cholecalciferol 1000 Unit(s) Oral daily  dextrose 5%. 1000 milliLiter(s) (100 mL/Hr) IV Continuous <Continuous>  dextrose 5%. 1000 milliLiter(s) (50 mL/Hr) IV Continuous <Continuous>  dextrose 50% Injectable 25 Gram(s) IV Push once  dextrose 50% Injectable 12.5 Gram(s) IV Push once  dextrose 50% Injectable 25 Gram(s) IV Push once  dextrose Oral Gel 15 Gram(s) Oral once  digoxin     Tablet 125 MICROGram(s) Oral daily  famotidine    Tablet 20 milliGRAM(s) Oral daily  ferrous    sulfate 325 milliGRAM(s) Oral two times a day  gabapentin 100 milliGRAM(s) Oral three times a day  glucagon  Injectable 1 milliGRAM(s) IntraMuscular once  insulin lispro (ADMELOG) corrective regimen sliding scale   SubCutaneous three times a day before meals  insulin lispro (ADMELOG) corrective regimen sliding scale   SubCutaneous at bedtime  melatonin 5 milliGRAM(s) Oral at bedtime  metoprolol tartrate 12.5 milliGRAM(s) Oral every 6 hours  midodrine. 15 milliGRAM(s) Oral three times a day  multivitamin 1 Tablet(s) Oral daily  mupirocin 2% Nasal 1 Application(s) Both Nostrils two times a day  naloxegol 25 milliGRAM(s) Oral daily  oxyCODONE  ER Tablet 10 milliGRAM(s) Oral every 12 hours  pantoprazole    Tablet 40 milliGRAM(s) Oral before breakfast  polyethylene glycol 3350 17 Gram(s) Oral daily  senna 2 Tablet(s) Oral at bedtime  sucralfate 1 Gram(s) Oral two times a day  tiotropium 2.5 MICROgram(s)/olodaterol 2.5 MICROgram(s) (STIOLTO) Inhaler 2 Puff(s) Inhalation daily  trimethoprim  160 mG/sulfamethoxazole 800 mG 1 Tablet(s) Oral two times a day    MEDICATIONS  (PRN):  acetaminophen     Tablet .. 650 milliGRAM(s) Oral every 6 hours PRN Temp greater or equal to 38C (100.4F)  oxyCODONE    IR 2.5 milliGRAM(s) Oral every 4 hours PRN Moderate Pain (4 - 6)  sodium chloride 0.65% Nasal 1 Spray(s) Both Nostrils daily PRN Nasal Congestion  traMADol 50 milliGRAM(s) Oral every 4 hours PRN Mild Pain (1 - 3)    Allergies    fish (Hives)  ertapenem (Blisters; Rash)    Intolerances      Vital Signs Last 24 Hrs  T(C): 36.4 (18 Sep 2024 05:14), Max: 36.9 (17 Sep 2024 11:34)  T(F): 97.5 (18 Sep 2024 05:14), Max: 98.5 (17 Sep 2024 21:17)  HR: 76 (18 Sep 2024 05:14) (70 - 98)  BP: 102/60 (18 Sep 2024 05:14) (102/60 - 117/72)  BP(mean): --  RR: 18 (17 Sep 2024 23:15) (17 - 18)  SpO2: 95% (17 Sep 2024 23:15) (95% - 95%)    Parameters below as of 17 Sep 2024 23:15  Patient On (Oxygen Delivery Method): room air      I&O's Summary    17 Sep 2024 07:01  -  18 Sep 2024 07:00  --------------------------------------------------------  IN: 0 mL / OUT: 1600 mL / NET: -1600 mL          TELE: Not on telemetry   PHYSICAL EXAM:  Constitutional: NAD, awake and alert  HEENT: Moist Mucous Membranes, Anicteric  Pulmonary: Non-labored, breath sounds are clear bilaterally, No wheezing, rales or rhonchi  Cardiovascular: Regular, S1 and S2, No murmurs, rubs, gallops or clicks  Gastrointestinal: Bowel Sounds present, soft, nontender.   Lymph: +peripheral edema. No lymphadenopathy.  Skin: No visible rashes or ulcers. R foot DSD intact   Psych:  Mood & affect appropriate  LABS: All Labs Reviewed:                        8.8    8.98  )-----------( 341      ( 17 Sep 2024 14:13 )             27.4                         9.3    10.38 )-----------( 362      ( 17 Sep 2024 06:46 )             28.7                         8.7    9.33  )-----------( 304      ( 15 Sep 2024 11:59 )             28.7     15 Sep 2024 11:59    137    |  108    |  10     ----------------------------<  146    4.2     |  21     |  0.77     Ca    8.0        15 Sep 2024 11:59    TPro  4.9    /  Alb  1.5    /  TBili  0.3    /  DBili  x      /  AST  21     /  ALT  22     /  AlkPhos  92     15 Sep 2024 11:59    PTT - ( 17 Sep 2024 22:37 )  PTT:53.6 sec          TRANSTHORACIC ECHOCARDIOGRAM REPORT  ________________________________________________________________________________                                      _______       Pt. Name:       SARAH MORRISON Study Date:    9/5/2024  MRN:            [id number]         YOB: 1970  Accession #:    [id number]        Age:           54 years  Account#:       [id number]       Gender:        M  Heart Rate:                      Height:        74.02 in (188.00 cm)  Rhythm:                          Weight:        207.23 lb (94.00 kg)  Blood Pressure: 97/60 mmHg       BSA/BMI:       2.21 m² / 26.60 kg/m²  ________________________________________________________________________________________  Referring Physician:    [id number] Hill Brizuela  Interpreting Physician: Claudette Jacobs  Primary Sonographer:    Mounika FELDMAN    CPT:               ECHO TTE WO CON COMP W DOPP - 34132.m  Indication(s):     Abnormal electrocardiogram ECG/EKG - R94.31  Procedure:         Transthoracic echocardiogram with2-D, M-mode and complete                     spectral and color flow Doppler.  Ordering Location: [facility]  Admission Status:  Inpatient  Study Information: Image quality for this study is technically difficult.    _______________________________________________________________________________________     CONCLUSIONS:      1. Technically difficult image quality.   2. Left ventricular cavity is normal in size. Left ventricular wall thickness is normal. Left ventricular systolic function is normal withan ejection fraction of 69 % by Castañeda's method of disks. There are no regional wall motion abnormalities seen.   3. There is increased LV mass and concentric hypertrophy.   4. Normal right ventricular cavity size and normal right ventricular systolic function.   5. Left atrium is severely dilated.   6. The right atrium is severely dilated.   7. There is calcification of the mitral valve annulus.   8. Mitral valve leaflets are diffusely calcified.   9. Moderate to severe mitral regurgitation.  10. Trileaflet aortic valve with normal systolic excursion. There is calcification of the aortic valve leaflets.  11. Mild tricuspid regurgitation.  12. Estimated pulmonary artery systolic pressure is 39 mmHg, consistent with borderline pulmonary hypertension.  13. The inferior vena cava is normal in size measuring 1.75 cm in diameter, (normal <2.1cm) with normal inspiratory collapse (normal >50%) consistent with normal right atrial pressure (~3, range 0-5mmHg).    ________________________________________________________________________________________  FINDINGS:     Left Ventricle:  The left ventricular cavity is normal in size. Left ventricular wall thickness is normal. Left ventricular systolic function is normal with a calculated ejection fraction of 69 % by the Castañeda's biplane method of disks. There are no regional wall motion abnormalities seen. There is increased LV mass and concentric hypertrophy. The left ventricular diastolic function is indeterminate.     Right Ventricle:  The right ventricular cavity is normal in size and right ventricular systolic function is normal.     Left Atrium:  The left atrium is severely dilated with an indexed volume of 31.95 ml/m².     Right Atrium:  The right atrium is severely dilated with an indexed volume of 26.74 ml/m².     Aortic Valve:  The aortic valve appears trileaflet with normal systolic excursion. There is calcification of the aortic valve leaflets.     Mitral Valve:  There is calcification of the mitral valve annulus. Mitral valve leaflets are diffusely calcified. There is moderate to severe mitral regurgitation.     Tricuspid Valve:  Structurally normal tricuspid valve with normal leaflet excursion. There is mild tricuspid regurgitation. Estimated pulmonary artery systolicpressure is 39 mmHg, consistent with borderline pulmonary hypertension.     Pulmonic Valve:  The pulmonic valve was not well visualized. There is trace pulmonic regurgitation.     Systemic Veins:  The inferior vena cava is normal in size measuring 1.75 cm in diameter, (normal <2.1cm) with normal inspiratory collapse (normal >50%) consistent with normal right atrial pressure (~3, range 0-5mmHg).  ____________________________________________________________________  QUANTITATIVE DATA:  Left Ventricle Measurements: (Indexed to BSA)     IVSd (2D):   1.4 cm  LVPWd (2D):  1.3 cm  LVIDd (2D):  5.4 cm  LVIDs (2D):  4.0 cm  LV Mass:     306 g  138.7 g/m²  LV Vol d, MOD A2C: 87.4 ml 39.62 ml/m²  LV Vol d, MOD A4C: 81.2 ml 36.78 ml/m²  LV Vol d, MOD BP: 92.3 ml 41.82 ml/m²  LV Vol s, MOD A2C: 29.0 ml 13.13 ml/m²  LV Vol s, MOD A4C: 28.3 ml 12.82 ml/m²  LV Vol s, MOD BP:  28.9 ml 13.08 ml/m²  LVOT SV MOD BP:    63.4 ml  LV EF% MOD BP:     69 %     MV E Vmax:    1.19 m/s  e' lateral:   13.10 cm/s  e'medial:    10.70 cm/s  E/e' lateral: 9.05  E/e' medial:  12.00  E/e' Average: 9.96  MV DT:        148 msec    Aorta Measurements: (Normal range) (Indexed to BSA)     Ao Root d 3.23 cm (3.1 - 3.7 cm) 1.46 cm/m²            Left Atrium Measurements: (Indexed to BSA)  LA Diam 2D: 4.72 cm         Right Atrial Measurements:     RA Vol:       59.00 ml  RA Vol Index: 26.74 ml/m²       LVOT / RVOT/ Qp/Qs Data: (Indexed to BSA)  LVOT Diameter: 2.29 cm  LVOT Area:     4.12 cm²    Mitral Valve Measurements:     MV E Vmax: 1.2 m/s       Tricuspid Valve Measurements:     TR Vmax:          3.0 m/s  TR Peak Gradient: 35.9 mmHg  RA Pressure:      3 mmHg  PASP:             39 mmHg    ________________________________________________________________________________________  Electronically signed on 9/6/2024 at 10:38:59 AM by Claudette Jacobs         *** Final ***

## 2024-09-18 NOTE — DISCHARGE NOTE NURSING/CASE MANAGEMENT/SOCIAL WORK - PATIENT PORTAL LINK FT
You can access the FollowMyHealth Patient Portal offered by [facility] by registering at the following website: [email or web address]. By joining [facility]’s FollowMyHealth portal, you will also be able to view your health information using other applications (apps) compatible with our system.

## 2024-09-18 NOTE — PROGRESS NOTE ADULT - PROBLEM SELECTOR PLAN 1
S/P IV ABX - zosyn per ID  ID eval with dr. lan appreciated   OM  ? MR  S/p debridement and partial calcanectomy per podiatry   may need BKA  vascular eval  per vasc, risks do not outweigh potential benefits  wound vac applied- plan for dc to [facility] w wound vac

## 2024-09-18 NOTE — PROGRESS NOTE ADULT - SUBJECTIVE AND OBJECTIVE BOX
PROGRESS NOTE   Patient is a 54y old  Male who presents with a chief complaint of AF (18 Sep 2024 08:16)      HPI:  55yo male bib ems from wound care with hypotension and poss gangrenous foot, being treated after having amputations, pt denies fever, chills, has no pain  In ER patient was found to be in hypotension and rapid AF.  patient is being admitted for further work up and treatment.   (05 Sep 2024 17:06)      Vital Signs Last 24 Hrs  T(C): 36.4 (18 Sep 2024 05:14), Max: 36.9 (17 Sep 2024 11:34)  T(F): 97.5 (18 Sep 2024 05:14), Max: 98.5 (17 Sep 2024 21:17)  HR: 76 (18 Sep 2024 05:14) (70 - 98)  BP: 102/60 (18 Sep 2024 05:14) (102/60 - 117/72)  BP(mean): --  RR: 18 (17 Sep 2024 23:15) (17 - 18)  SpO2: 95% (17 Sep 2024 23:15) (95% - 95%)    Parameters below as of 17 Sep 2024 23:15  Patient On (Oxygen Delivery Method): room air                              9.0    6.99  )-----------( 379      ( 18 Sep 2024 06:20 )             28.6                       PHYSICAL EXAM  Vasc: DP PT pulses non-palpable b/l.  Neuro: Diminished protective sensation b/l  MSK: Healed TMA noted to left foot. s/p right partial calcanectomy with wound debridement   Dermatological: Surgical site of right foot noted to have mild winston-incision erythema, no proximal streaking, no fluctuance, no malodor, no sanguinous oozing at this time     Surgical pathology report:  Final Diagnosis  Right foot calcaneus bone:  -   Portion of bone with acute and chronic osteomyelitis.

## 2024-09-18 NOTE — PROGRESS NOTE ADULT - PROBLEM SELECTOR PROBLEM 3
Anemia of chronic disease
Anemia of chronic disease
Type 2 diabetes mellitus
Atrial fibrillation
Type 2 diabetes mellitus
Atrial fibrillation
Type 2 diabetes mellitus
Type 2 diabetes mellitus
Atrial fibrillation
Type 2 diabetes mellitus
Atrial fibrillation

## 2024-10-09 ENCOUNTER — OUTPATIENT (OUTPATIENT)
Dept: OUTPATIENT SERVICES | Facility: HOSPITAL | Age: 54
LOS: 1 days | Discharge: HOME | End: 2024-10-09
Payer: MEDICAID

## 2024-10-09 ENCOUNTER — APPOINTMENT (OUTPATIENT)
Dept: WOUND CARE | Facility: HOSPITAL | Age: 54
End: 2024-10-09

## 2024-10-09 VITALS
OXYGEN SATURATION: 96 % | HEART RATE: 92 BPM | SYSTOLIC BLOOD PRESSURE: 104 MMHG | TEMPERATURE: 98.2 F | BODY MASS INDEX: 28.23 KG/M2 | WEIGHT: 220 LBS | DIASTOLIC BLOOD PRESSURE: 69 MMHG | HEIGHT: 74 IN | RESPIRATION RATE: 18 BRPM

## 2024-10-09 DIAGNOSIS — Z98.890 OTHER SPECIFIED POSTPROCEDURAL STATES: Chronic | ICD-10-CM

## 2024-10-09 DIAGNOSIS — K25.5 CHRONIC OR UNSPECIFIED GASTRIC ULCER WITH PERFORATION: Chronic | ICD-10-CM

## 2024-10-09 DIAGNOSIS — M86.171 OTHER ACUTE OSTEOMYELITIS, RIGHT ANKLE AND FOOT: ICD-10-CM

## 2024-10-09 DIAGNOSIS — S98.912A COMPLETE TRAUMATIC AMPUTATION OF LEFT FOOT, LEVEL UNSPECIFIED, INITIAL ENCOUNTER: Chronic | ICD-10-CM

## 2024-10-09 DIAGNOSIS — M86.672 OTHER CHRONIC OSTEOMYELITIS, LEFT ANKLE AND FOOT: ICD-10-CM

## 2024-10-09 DIAGNOSIS — E11.69 TYPE 2 DIABETES MELLITUS WITH OTHER SPECIFIED COMPLICATION: ICD-10-CM

## 2024-10-09 PROCEDURE — 97606 NEG PRS WND THER DME>50 SQCM: CPT

## 2024-10-09 PROCEDURE — 99212 OFFICE O/P EST SF 10 MIN: CPT

## 2024-10-13 PROBLEM — M86.171: Status: ACTIVE | Noted: 2024-10-13

## 2024-10-13 PROBLEM — M86.672 CHRONIC NONBACTERIAL OSTEOMYELITIS OF LEFT FOOT: Status: ACTIVE | Noted: 2024-10-13

## 2024-10-13 NOTE — ASSESSMENT
[Verbal] : Verbal [Patient] : Patient [Caregiver] : Caregiver [Good - alert, interested, motivated] : Good - alert, interested, motivated [Verbalizes knowledge/Understanding] : Verbalizes knowledge/understanding [Dressing changes] : dressing changes [Skin Care] : skin care [Pressure relief] : pressure relief [Signs and symptoms of infection] : sign and symptoms of infection [Surgery] : surgery [Nutrition] : nutrition [How and When to Call] : how and when to call [Labs and Tests] : labs and tests [Pain Management] : pain management [Off-loading] : off-loading [Hospitalization] : hospitalization [Patient responsibility to plan of care] : patient responsibility to plan of care [Stable] : stable [Stretcher] : Stretcher [Not Applicable - Long Term Care/Home Health Agency] : Long Term Care/Home Health Agency: Not Applicable [] : Yes [Written] : Written [Demo] : Demo [Foot Care] : foot care [Glycemic Control] : glycemic control [Other: ____] : [unfilled] [FreeTextEntry4] : Pt. declining to have MD assess sacral wound today.  States that nurses/aides apply TRIAD cream daily and as needed, and there has not been any drainage. MD Small made aware.  Pt. arrived without Wound Vac - as per pt. and aide, Wound Vac removed at facility prior to this visit, and will be replaced once pt. returns. MANISH Howard assessed wound site - Verbally ordered Right Heel  NPT Wound Vac w/one piece of Black Foam set ap948ms Hg, 3x per week.  Right Dorsal Foot - Betadine, dry dressing 3x per week.  Authorization for Apligraf submitted for next visit.  Patient verbalized understanding of all discussed. Patient to return to C in One week. C instructions given to patient and care aide, and Facility paperwork completed and signed - returned to pt. [FreeTextEntry2] : Infection Prevention Wound care and Dressing changes Promote and Restore optimal skin integrity Nutrition and Wound Healing  Offloading and Pressure relief Protect and Guard wound site  Maintain acceptable levels of Pain Compliance R/T Elevation of Lower Extremities

## 2024-10-13 NOTE — PLAN
[FreeTextEntry1] : .Patient seen and evaluated. Discussed etiology and treatment plan. Patient verbalized understanding. C/w local wound care and offloading, c/w wound vac. Patient will benefit from skin graft substitute to aid with the healing process. Patient is currently in rehab and will return in one week. Spent 20 minutes for patient care and medical decision making.

## 2024-10-13 NOTE — PHYSICAL EXAM
[Abdominal Pad] : Abdominal Pad [4 x 4] : 4 x 4  [1+] : left 1+ [Ankle Swelling (On Exam)] : present [Ankle Swelling On The Left] : moderate [Varicose Veins Of Lower Extremities] : not present [] : not present [Skin Ulcer] : ulcer [Alert] : alert [Oriented to Person] : oriented to person [Oriented to Place] : oriented to place [Calm] : calm [de-identified] : calm [de-identified] : htn, hld, diabetic cardiovascular disease [de-identified] : s/p left TMA and Achilles tenotomy, pt has been non ambulatory since february  [de-identified] : left foot TMA - closed.  Right posterior heel wound of skin, subcutaneous tissue, fat with granulation tissue, serous drainage  [de-identified] : IDDM with neuropathy  [de-identified] : Sanguinous [de-identified] : 95% [de-identified] : 5% [FreeTextEntry7] : Right  Dorsal Foot / Ankle - NEW [FreeTextEntry8] : 2.2 [FreeTextEntry9] : 2.0 [de-identified] : 0.1 [de-identified] : Intact [de-identified] : Betadine,  Dry dressing  [de-identified] : Mechanically cleansed with 0.9% Normal Saline Kerlix [de-identified] : Pt. declining visit with MD for Sacral wound today - 10/9/24 - MD Small made aware Pt. states that the wound is not draining, and the nurses/aides  use TRIAD cream daily / prn with diaper changes.  [de-identified] : Sacrum - Not evaluated at this visit [FreeTextEntry1] : Right Foot / Ankle  - Dorsal - NEW [FreeTextEntry2] : 2.2 [FreeTextEntry3] : 2.0 [FreeTextEntry4] : 0.1 [de-identified] : Betadine,  Dry dressing  [de-identified] : Mechanically cleansed with 0.9% Normal Saline Kerlix [de-identified] : No [de-identified] : None [de-identified] : Yes [de-identified] : Bone [TWNoteComboBox7] : False [de-identified] : False [de-identified] : 3x Weekly [de-identified] : Primary Dressing [de-identified] : None [de-identified] : No [de-identified] : Pressure [de-identified] : No [de-identified] : None [de-identified] : 100% [de-identified] : None [de-identified] : 3x Weekly [de-identified] : Primary Dressing [de-identified] : False [de-identified] : False [de-identified] : False [de-identified] : False [de-identified] : False [de-identified] : False [de-identified] : False 02-Jun-2023 18:25 [de-identified] : False [de-identified] : False [de-identified] : False [TWNoteComboBox4] : None [TWNoteComboBox5] : No [TWNoteComboBox6] : Pressure [de-identified] : No [de-identified] : None [de-identified] : Erythema [de-identified] : 100% [de-identified] : 3x Weekly [de-identified] : Primary Dressing

## 2024-10-13 NOTE — PHYSICAL EXAM
[Abdominal Pad] : Abdominal Pad [4 x 4] : 4 x 4  [1+] : left 1+ [Ankle Swelling (On Exam)] : present [Ankle Swelling On The Left] : moderate [Varicose Veins Of Lower Extremities] : not present [Skin Ulcer] : ulcer [] : not present [Alert] : alert [Oriented to Person] : oriented to person [Oriented to Place] : oriented to place [Calm] : calm [de-identified] : calm [de-identified] : htn, hld, diabetic cardiovascular disease [de-identified] : s/p left TMA and Achilles tenotomy, pt has been non ambulatory since february  [de-identified] : left foot TMA - closed.  Right posterior heel wound of skin, subcutaneous tissue, fat with granulation tissue, serous drainage  [de-identified] : IDDM with neuropathy  [de-identified] : Sanguinous [de-identified] : 95% [de-identified] : 5% [FreeTextEntry7] : Right  Dorsal Foot / Ankle - NEW [FreeTextEntry9] : 2.0 [FreeTextEntry8] : 2.2 [de-identified] : 0.1 [de-identified] : Intact [de-identified] : Betadine,  Dry dressing  [de-identified] : Pt. declining visit with MD for Sacral wound today - 10/9/24 - MD Small made aware Pt. states that the wound is not draining, and the nurses/aides  use TRIAD cream daily / prn with diaper changes.  [de-identified] : Mechanically cleansed with 0.9% Normal Saline Kerlix [de-identified] : Sacrum - Not evaluated at this visit [FreeTextEntry1] : Right Foot / Ankle  - Dorsal - NEW [FreeTextEntry2] : 2.2 [FreeTextEntry3] : 2.0 [FreeTextEntry4] : 0.1 [de-identified] : Betadine,  Dry dressing  [de-identified] : Mechanically cleansed with 0.9% Normal Saline Kerlix [de-identified] : No [de-identified] : None [de-identified] : Yes [de-identified] : Bone [TWNoteComboBox7] : False [de-identified] : False [de-identified] : 3x Weekly [de-identified] : Primary Dressing [de-identified] : None [de-identified] : No [de-identified] : Pressure [de-identified] : No [de-identified] : None [de-identified] : 100% [de-identified] : None [de-identified] : 3x Weekly [de-identified] : Primary Dressing [de-identified] : False [de-identified] : False [de-identified] : False [de-identified] : False [de-identified] : False [de-identified] : False [de-identified] : False [de-identified] : False [de-identified] : False [de-identified] : False [TWNoteComboBox4] : None [TWNoteComboBox5] : No [TWNoteComboBox6] : Pressure [de-identified] : No [de-identified] : None [de-identified] : Erythema [de-identified] : 100% [de-identified] : 3x Weekly [de-identified] : Primary Dressing

## 2024-10-13 NOTE — REVIEW OF SYSTEMS
[Fever] : no fever [Chills] : no chills [Eye Pain] : no eye pain [Earache] : no earache [Loss Of Hearing] : no hearing loss [Chest Pain] : no chest pain [Shortness Of Breath] : no shortness of breath [Abdominal Pain] : no abdominal pain [Skin Wound] : skin wound [Limb Weakness] : limb weakness [Anxiety] : no anxiety [Easy Bleeding] : a tendency for easy bleeding [FreeTextEntry5] : htn, hld diabetic cardio vascular disease  [FreeTextEntry9] : s/p left foot TMA w/Achilles tenotomy [de-identified] : left foot DFU3, s/p TMA-  healed, right heel wound down to bone , s/p partial calcanectomy with (+) OM on path [de-identified] : diabetic neuropathy [de-identified] : IDDM with neuropathy

## 2024-10-13 NOTE — REVIEW OF SYSTEMS
[Fever] : no fever [Chills] : no chills [Eye Pain] : no eye pain [Earache] : no earache [Loss Of Hearing] : no hearing loss [Chest Pain] : no chest pain [Shortness Of Breath] : no shortness of breath [Abdominal Pain] : no abdominal pain [Skin Wound] : skin wound [Limb Weakness] : limb weakness [Anxiety] : no anxiety [Easy Bleeding] : a tendency for easy bleeding [FreeTextEntry5] : htn, hld diabetic cardio vascular disease  [FreeTextEntry9] : s/p left foot TMA w/Achilles tenotomy [de-identified] : left foot DFU3, s/p TMA-  healed, right heel wound down to bone , s/p partial calcanectomy with (+) OM on path [de-identified] : diabetic neuropathy [de-identified] : IDDM with neuropathy

## 2024-10-13 NOTE — ASSESSMENT
[Verbal] : Verbal [Patient] : Patient [Caregiver] : Caregiver [Good - alert, interested, motivated] : Good - alert, interested, motivated [Verbalizes knowledge/Understanding] : Verbalizes knowledge/understanding [Dressing changes] : dressing changes [Skin Care] : skin care [Pressure relief] : pressure relief [Signs and symptoms of infection] : sign and symptoms of infection [Surgery] : surgery [Nutrition] : nutrition [How and When to Call] : how and when to call [Labs and Tests] : labs and tests [Pain Management] : pain management [Off-loading] : off-loading [Hospitalization] : hospitalization [Patient responsibility to plan of care] : patient responsibility to plan of care [Stable] : stable [Stretcher] : Stretcher [Not Applicable - Long Term Care/Home Health Agency] : Long Term Care/Home Health Agency: Not Applicable [] : Yes [Written] : Written [Demo] : Demo [Foot Care] : foot care [Glycemic Control] : glycemic control [Other: ____] : [unfilled] [FreeTextEntry4] : Pt. declining to have MD assess sacral wound today.  States that nurses/aides apply TRIAD cream daily and as needed, and there has not been any drainage. MD Small made aware.  Pt. arrived without Wound Vac - as per pt. and aide, Wound Vac removed at facility prior to this visit, and will be replaced once pt. returns. MANISH Howard assessed wound site - Verbally ordered Right Heel  NPT Wound Vac w/one piece of Black Foam set [id number] Hg, 3x per week.  Right Dorsal Foot - Betadine, dry dressing 3x per week.  Authorization for Apligraf submitted for next visit.  Patient verbalized understanding of all discussed. Patient to return to C in One week. C instructions given to patient and care aide, and Facility paperwork completed and signed - returned to pt. [FreeTextEntry2] : Infection Prevention Wound care and Dressing changes Promote and Restore optimal skin integrity Nutrition and Wound Healing  Offloading and Pressure relief Protect and Guard wound site  Maintain acceptable levels of Pain Compliance R/T Elevation of Lower Extremities

## 2024-10-13 NOTE — HISTORY OF PRESENT ILLNESS
[FreeTextEntry1] : Patient is 54 year old male presenting for f/u s/p right heel partial calcanectomy as on 09/10/24. Patient with (+) OM acute and chronic on pathology. Patient is currently in rehab and is in wheel chair with being non weight bearing to the right lower extremity. Patient is on BActrim per ID until 10/21/24.

## 2024-10-14 DIAGNOSIS — Z89.432 ACQUIRED ABSENCE OF LEFT FOOT: ICD-10-CM

## 2024-10-14 DIAGNOSIS — E87.6 HYPOKALEMIA: ICD-10-CM

## 2024-10-14 DIAGNOSIS — K59.00 CONSTIPATION, UNSPECIFIED: ICD-10-CM

## 2024-10-14 DIAGNOSIS — E11.69 TYPE 2 DIABETES MELLITUS WITH OTHER SPECIFIED COMPLICATION: ICD-10-CM

## 2024-10-14 DIAGNOSIS — E56.9 VITAMIN DEFICIENCY, UNSPECIFIED: ICD-10-CM

## 2024-10-14 DIAGNOSIS — Z79.899 OTHER LONG TERM (CURRENT) DRUG THERAPY: ICD-10-CM

## 2024-10-14 DIAGNOSIS — Z86.73 PERSONAL HISTORY OF TRANSIENT ISCHEMIC ATTACK (TIA), AND CEREBRAL INFARCTION WITHOUT RESIDUAL DEFICITS: ICD-10-CM

## 2024-10-14 DIAGNOSIS — D64.9 ANEMIA, UNSPECIFIED: ICD-10-CM

## 2024-10-14 DIAGNOSIS — E11.59 TYPE 2 DIABETES MELLITUS WITH OTHER CIRCULATORY COMPLICATIONS: ICD-10-CM

## 2024-10-14 DIAGNOSIS — I25.2 OLD MYOCARDIAL INFARCTION: ICD-10-CM

## 2024-10-14 DIAGNOSIS — Z91.013 ALLERGY TO SEAFOOD: ICD-10-CM

## 2024-10-14 DIAGNOSIS — Z87.891 PERSONAL HISTORY OF NICOTINE DEPENDENCE: ICD-10-CM

## 2024-10-14 DIAGNOSIS — Z87.440 PERSONAL HISTORY OF URINARY (TRACT) INFECTIONS: ICD-10-CM

## 2024-10-14 DIAGNOSIS — M86.171 OTHER ACUTE OSTEOMYELITIS, RIGHT ANKLE AND FOOT: ICD-10-CM

## 2024-10-14 DIAGNOSIS — M86.671 OTHER CHRONIC OSTEOMYELITIS, RIGHT ANKLE AND FOOT: ICD-10-CM

## 2024-10-14 DIAGNOSIS — Z79.01 LONG TERM (CURRENT) USE OF ANTICOAGULANTS: ICD-10-CM

## 2024-10-14 DIAGNOSIS — E11.40 TYPE 2 DIABETES MELLITUS WITH DIABETIC NEUROPATHY, UNSPECIFIED: ICD-10-CM

## 2024-10-14 DIAGNOSIS — Z79.84 LONG TERM (CURRENT) USE OF ORAL HYPOGLYCEMIC DRUGS: ICD-10-CM

## 2024-10-14 DIAGNOSIS — L97.414 NON-PRESSURE CHRONIC ULCER OF RIGHT HEEL AND MIDFOOT WITH NECROSIS OF BONE: ICD-10-CM

## 2024-10-16 ENCOUNTER — OUTPATIENT (OUTPATIENT)
Dept: OUTPATIENT SERVICES | Facility: HOSPITAL | Age: 54
LOS: 1 days | Discharge: HOME | End: 2024-10-16
Payer: MEDICAID

## 2024-10-16 ENCOUNTER — APPOINTMENT (OUTPATIENT)
Dept: WOUND CARE | Facility: HOSPITAL | Age: 54
End: 2024-10-16
Payer: MEDICAID

## 2024-10-16 VITALS
DIASTOLIC BLOOD PRESSURE: 59 MMHG | HEIGHT: 74 IN | HEART RATE: 97 BPM | TEMPERATURE: 99.2 F | SYSTOLIC BLOOD PRESSURE: 103 MMHG | RESPIRATION RATE: 18 BRPM | OXYGEN SATURATION: 96 % | BODY MASS INDEX: 28.23 KG/M2 | WEIGHT: 220 LBS

## 2024-10-16 DIAGNOSIS — Z98.890 OTHER SPECIFIED POSTPROCEDURAL STATES: Chronic | ICD-10-CM

## 2024-10-16 DIAGNOSIS — E11.40 TYPE 2 DIABETES MELLITUS WITH DIABETIC NEUROPATHY, UNSPECIFIED: ICD-10-CM

## 2024-10-16 DIAGNOSIS — K25.5 CHRONIC OR UNSPECIFIED GASTRIC ULCER WITH PERFORATION: Chronic | ICD-10-CM

## 2024-10-16 DIAGNOSIS — S98.912A COMPLETE TRAUMATIC AMPUTATION OF LEFT FOOT, LEVEL UNSPECIFIED, INITIAL ENCOUNTER: Chronic | ICD-10-CM

## 2024-10-16 DIAGNOSIS — E11.59 TYPE 2 DIABETES MELLITUS WITH OTHER CIRCULATORY COMPLICATIONS: ICD-10-CM

## 2024-10-16 DIAGNOSIS — E11.69 TYPE 2 DIABETES MELLITUS WITH OTHER SPECIFIED COMPLICATION: ICD-10-CM

## 2024-10-16 PROCEDURE — 15275 SKIN SUB GRAFT FACE/NK/HF/G: CPT

## 2024-10-16 PROCEDURE — 15276 SKIN SUB GRAFT F/N/HF/G ADDL: CPT

## 2024-10-20 PROBLEM — E11.59 CONTROLLED DIABETES MELLITUS WITH CIRCULATORY COMPLICATION: Status: ACTIVE | Noted: 2024-10-20

## 2024-10-20 NOTE — PHYSICAL EXAM
[Abdominal Pad] : Abdominal Pad [4 x 4] : 4 x 4  [1+] : left 1+ [Ankle Swelling (On Exam)] : present [Ankle Swelling On The Left] : moderate [Varicose Veins Of Lower Extremities] : not present [] : not present [Skin Ulcer] : ulcer [Alert] : alert [Oriented to Person] : oriented to person [Oriented to Place] : oriented to place [Calm] : calm [de-identified] : calm [de-identified] : s/p left TMA and Achilles tenotomy, pt has been non ambulatory since february  [de-identified] : left foot TMA - closed.  Right posterior heel wound of skin, subcutaneous tissue, fat with granulation tissue, serous drainage  [de-identified] : IDDM with neuropathy  [FreeTextEntry1] : Right heel  [FreeTextEntry2] : 12.0 [FreeTextEntry3] : 8.7 [FreeTextEntry4] : 0.1  [de-identified] : Serous/sanguinous [de-identified] : apligraf  [de-identified] : Feliz, Adaptic touch, Calcium alginate  [de-identified] : Mechanically cleansed with sterile gauze and normal saline. Kerlix  Post debridement measurements: 12.1/8.8/0.5   Outer dressing to be changed 3 times a week   Current Tissue : Tissue Product:  BIO_TISSUE Tissue Type:  HUMAN Added On The Fly:  No Serial Number:  [id number] 109 Lot Number:  [id number] Ext. Ref. Code:  [id number] Expiration Date:  10/25/2024 Received Date:  10/15/2024 Tissue Status:  Implanted Tissue Size:  1 unit of 44sq/cm applied 100% used, 0% discarded   Irrigated with 0.9% normal Saline  LOT: [id number] EXP: 05/08/2026  [FreeTextEntry7] : Right dorsal foot - 3 wounds within measurement  [FreeTextEntry8] : 3.0 [FreeTextEntry9] : 4.2 [de-identified] : 0.2  [de-identified] : Serous/sanguinous [de-identified] : 100%  [de-identified] : Medihoney  [de-identified] : Mechanically cleansed with sterile gauze and normal saline. Kerlix  [de-identified] : Right medial leg - area of weeping edema  [de-identified] : 2.7 [de-identified] : 0.9 [de-identified] : 0.1 [de-identified] : Serous/sanguinous [de-identified] : Silver alginate [de-identified] : Mechanically cleansed with sterile gauze and normal saline. Kerlix  [TWNoteComboBox4] : Moderate [de-identified] : Normal [de-identified] : None [de-identified] : None [de-identified] : 100% [de-identified] : No [de-identified] : Application of skin substitute [TWNoteComboBox7] : Eduard [de-identified] : Weekly [de-identified] : Primary Dressing [de-identified] : Small [de-identified] : Normal [de-identified] : None [de-identified] : None [de-identified] : None [de-identified] : Yes [de-identified] : 3x Weekly [de-identified] : Primary Dressing [de-identified] : Small [de-identified] : Erythema [de-identified] : None [de-identified] : None [de-identified] : No [de-identified] : 3x Weekly [de-identified] : Primary Dressing

## 2024-10-20 NOTE — REVIEW OF SYSTEMS
[FreeTextEntry1] : 1130hr [Fever] : no fever [Chills] : no chills [Eye Pain] : no eye pain [Earache] : no earache [Loss Of Hearing] : no hearing loss [Chest Pain] : no chest pain [Shortness Of Breath] : no shortness of breath [Abdominal Pain] : no abdominal pain [Skin Wound] : skin wound [Limb Weakness] : limb weakness [Anxiety] : no anxiety [Easy Bleeding] : a tendency for easy bleeding [FreeTextEntry5] : htn, hld diabetic cardio vascular disease  [FreeTextEntry9] : s/p left foot TMA w/Achilles tenotomy [de-identified] : left foot DFU3, s/p TMA-  healed, right heel wound down to bone , s/p partial calcanectomy with (+) OM on path [de-identified] : diabetic neuropathy [de-identified] : IDDM with neuropathy

## 2024-10-20 NOTE — VITALS
[Pain related to present condition?] : The patient's  pain is related to present condition. [Tender] : tender [Occasional] : occasional [] : No [de-identified] : 5/10  [FreeTextEntry3] : Right heel  [FreeTextEntry1] : Oxycodone  [FreeTextEntry2] : direct contact  [FreeTextEntry4] : Thick padded dressings

## 2024-10-20 NOTE — ASSESSMENT
[Verbal] : Verbal [Written] : Written [Demo] : Demo [Patient] : Patient [Caregiver] : Caregiver [Good - alert, interested, motivated] : Good - alert, interested, motivated [Verbalizes knowledge/Understanding] : Verbalizes knowledge/understanding [Dressing changes] : dressing changes [Foot Care] : foot care [Skin Care] : skin care [Signs and symptoms of infection] : sign and symptoms of infection [Nutrition] : nutrition [How and When to Call] : how and when to call [Pain Management] : pain management [Patient responsibility to plan of care] : patient responsibility to plan of care [Stable] : stable [Other: ____] : [unfilled] [Wheelchair] : Wheelchair [Not Applicable - Long Term Care/Home Health Agency] : Long Term Care/Home Health Agency: Not Applicable [] : No [FreeTextEntry2] : Infection prevention Localized wound care  Goal remaining pain free regarding wounds Skin substitute therapy  autolytic debridement   [FreeTextEntry3] : Heel wound larger in size  [FreeTextEntry4] : Auth submitted for next Apligraf  Detailed instructions on how to preform dressing changes sent with patient back to facility.  Pt refused to have sacrum examined stating that it's just and irritation and facility nurses are treating with Triad.  Follow up in 1 week

## 2024-10-20 NOTE — PROCEDURE
[Saline] : saline [Scalpel] : scalpel [Skin] : skin [Subcutaneous Tissue] : subcutaneous tissue [Fenestrated] : fenestrated [Hydrated with saline] : hydrated with saline [Apligraf] : apligraf [____ % was used] : and [unfilled] % was used [____ % was discarded] : and [unfilled] % was discarded [FreeTextEntry1] : adaptic, calcium alginate and sterile dressing ; rest of the wound with silver alginate and sterile dressing  [FreeTextEntry9] : 1155hr [de-identified] : left foot TMA - closed.  Right posterior heel wound of skin, subcutaneous tissue, fat with granulation tissue, serous drainage  [de-identified] : Anita [FreeTextEntry7] : left foot TMA - closed.  Right posterior heel wound of skin, subcutaneous tissue, fat with granulation tissue, serous drainage  [FreeTextEntry6] : left foot TMA - closed.  Right posterior heel wound of skin, subcutaneous tissue, fat with granulation tissue, serous drainage

## 2024-10-20 NOTE — VITALS
[Pain related to present condition?] : The patient's  pain is related to present condition. [Tender] : tender [Occasional] : occasional [] : No [de-identified] : 5/10  [FreeTextEntry3] : Right heel  [FreeTextEntry1] : Oxycodone  [FreeTextEntry2] : direct contact  [FreeTextEntry4] : Thick padded dressings

## 2024-10-20 NOTE — PHYSICAL EXAM
[Abdominal Pad] : Abdominal Pad [4 x 4] : 4 x 4  [1+] : left 1+ [Ankle Swelling (On Exam)] : present [Ankle Swelling On The Left] : moderate [Varicose Veins Of Lower Extremities] : not present [] : not present [Skin Ulcer] : ulcer [Alert] : alert [Oriented to Person] : oriented to person [Oriented to Place] : oriented to place [Calm] : calm [de-identified] : calm [de-identified] : s/p left TMA and Achilles tenotomy, pt has been non ambulatory since february  [de-identified] : left foot TMA - closed.  Right posterior heel wound of skin, subcutaneous tissue, fat with granulation tissue, serous drainage  [de-identified] : IDDM with neuropathy  [FreeTextEntry1] : Right heel  [FreeTextEntry2] : 12.0 [FreeTextEntry3] : 8.7 [FreeTextEntry4] : 0.1  [de-identified] : Serous/sanguinous [de-identified] : apligraf  [de-identified] : Feliz, Adaptic touch, Calcium alginate  [de-identified] : Mechanically cleansed with sterile gauze and normal saline. Kerlix  Post debridement measurements: 12.1/8.8/0.5   Outer dressing to be changed 3 times a week   Current Tissue : Tissue Product:  BIO_TISSUE Tissue Type:  HUMAN Added On The Fly:  No Serial Number:  [id number] 109 Lot Number:  [id number] Ext. Ref. Code:  [id number] Expiration Date:  10/25/2024 Received Date:  10/15/2024 Tissue Status:  Implanted Tissue Size:  1 unit of 44sq/cm applied 100% used, 0% discarded   Irrigated with 0.9% normal Saline  LOT: [id number] EXP: 05/08/2026  [FreeTextEntry7] : Right dorsal foot - 3 wounds within measurement  [FreeTextEntry8] : 3.0 [FreeTextEntry9] : 4.2 [de-identified] : 0.2  [de-identified] : Serous/sanguinous [de-identified] : 100%  [de-identified] : Medihoney  [de-identified] : Mechanically cleansed with sterile gauze and normal saline. Kerlix  [de-identified] : Right medial leg - area of weeping edema  [de-identified] : 2.7 [de-identified] : 0.9 [de-identified] : 0.1 [de-identified] : Serous/sanguinous [de-identified] : Silver alginate [de-identified] : Mechanically cleansed with sterile gauze and normal saline. Kerlix  [TWNoteComboBox4] : Moderate [de-identified] : Normal [de-identified] : None [de-identified] : None [de-identified] : 100% [de-identified] : No [de-identified] : Application of skin substitute [TWNoteComboBox7] : Eduard [de-identified] : Weekly [de-identified] : Primary Dressing [de-identified] : Small [de-identified] : Normal [de-identified] : None [de-identified] : None [de-identified] : None [de-identified] : Yes [de-identified] : 3x Weekly [de-identified] : Primary Dressing [de-identified] : Small [de-identified] : Erythema [de-identified] : None [de-identified] : None [de-identified] : No [de-identified] : 3x Weekly [de-identified] : Primary Dressing

## 2024-10-20 NOTE — REVIEW OF SYSTEMS
[FreeTextEntry1] : 1130hr [Fever] : no fever [Chills] : no chills [Eye Pain] : no eye pain [Earache] : no earache [Loss Of Hearing] : no hearing loss [Chest Pain] : no chest pain [Shortness Of Breath] : no shortness of breath [Abdominal Pain] : no abdominal pain [Skin Wound] : skin wound [Limb Weakness] : limb weakness [Anxiety] : no anxiety [Easy Bleeding] : a tendency for easy bleeding [FreeTextEntry5] : htn, hld diabetic cardio vascular disease  [FreeTextEntry9] : s/p left foot TMA w/Achilles tenotomy [de-identified] : left foot DFU3, s/p TMA-  healed, right heel wound down to bone , s/p partial calcanectomy with (+) OM on path [de-identified] : diabetic neuropathy [de-identified] : IDDM with neuropathy

## 2024-10-20 NOTE — PROCEDURE
[Saline] : saline [Scalpel] : scalpel [Skin] : skin [Subcutaneous Tissue] : subcutaneous tissue [Fenestrated] : fenestrated [Hydrated with saline] : hydrated with saline [Apligraf] : apligraf [____ % was used] : and [unfilled] % was used [____ % was discarded] : and [unfilled] % was discarded [FreeTextEntry1] : adaptic, calcium alginate and sterile dressing ; rest of the wound with silver alginate and sterile dressing  [FreeTextEntry9] : 1155hr [de-identified] : left foot TMA - closed.  Right posterior heel wound of skin, subcutaneous tissue, fat with granulation tissue, serous drainage  [de-identified] : Anita [FreeTextEntry6] : left foot TMA - closed.  Right posterior heel wound of skin, subcutaneous tissue, fat with granulation tissue, serous drainage  [FreeTextEntry7] : left foot TMA - closed.  Right posterior heel wound of skin, subcutaneous tissue, fat with granulation tissue, serous drainage

## 2024-10-22 ENCOUNTER — OUTPATIENT (OUTPATIENT)
Dept: OUTPATIENT SERVICES | Facility: HOSPITAL | Age: 54
LOS: 1 days | Discharge: HOME | End: 2024-10-22
Payer: MEDICAID

## 2024-10-22 ENCOUNTER — APPOINTMENT (OUTPATIENT)
Dept: WOUND CARE | Facility: HOSPITAL | Age: 54
End: 2024-10-22
Payer: MEDICAID

## 2024-10-22 VITALS
BODY MASS INDEX: 28.23 KG/M2 | SYSTOLIC BLOOD PRESSURE: 98 MMHG | DIASTOLIC BLOOD PRESSURE: 65 MMHG | HEART RATE: 79 BPM | WEIGHT: 220 LBS | HEIGHT: 74 IN | OXYGEN SATURATION: 95 % | RESPIRATION RATE: 18 BRPM | TEMPERATURE: 98.4 F

## 2024-10-22 DIAGNOSIS — E11.40 TYPE 2 DIABETES MELLITUS WITH DIABETIC NEUROPATHY, UNSPECIFIED: ICD-10-CM

## 2024-10-22 DIAGNOSIS — S98.912A COMPLETE TRAUMATIC AMPUTATION OF LEFT FOOT, LEVEL UNSPECIFIED, INITIAL ENCOUNTER: Chronic | ICD-10-CM

## 2024-10-22 DIAGNOSIS — K25.5 CHRONIC OR UNSPECIFIED GASTRIC ULCER WITH PERFORATION: Chronic | ICD-10-CM

## 2024-10-22 DIAGNOSIS — Z98.890 OTHER SPECIFIED POSTPROCEDURAL STATES: Chronic | ICD-10-CM

## 2024-10-22 DIAGNOSIS — E11.69 TYPE 2 DIABETES MELLITUS WITH OTHER SPECIFIED COMPLICATION: ICD-10-CM

## 2024-10-22 PROCEDURE — 15275 SKIN SUB GRAFT FACE/NK/HF/G: CPT

## 2024-10-22 PROCEDURE — 15276 SKIN SUB GRAFT F/N/HF/G ADDL: CPT

## 2024-10-24 NOTE — ED PROVIDER NOTE - PROGRESS NOTE
Additional Notes: -mild DN + radha on left medial posterior back, bx proven 9/25/23\\n-mild DN + radha on left anterior lower leg, bx proven 3/24/23\\n-mild DN + radha on right upper back, bx proven 3/24/23 Render Risk Assessment In Note?: no Detail Level: Generalized Additional Notes: Daughter Stable.

## 2024-10-28 DIAGNOSIS — I25.2 OLD MYOCARDIAL INFARCTION: ICD-10-CM

## 2024-10-28 DIAGNOSIS — Z89.432 ACQUIRED ABSENCE OF LEFT FOOT: ICD-10-CM

## 2024-10-28 DIAGNOSIS — E56.9 VITAMIN DEFICIENCY, UNSPECIFIED: ICD-10-CM

## 2024-10-28 DIAGNOSIS — Z79.84 LONG TERM (CURRENT) USE OF ORAL HYPOGLYCEMIC DRUGS: ICD-10-CM

## 2024-10-28 DIAGNOSIS — Z86.73 PERSONAL HISTORY OF TRANSIENT ISCHEMIC ATTACK (TIA), AND CEREBRAL INFARCTION WITHOUT RESIDUAL DEFICITS: ICD-10-CM

## 2024-10-28 DIAGNOSIS — Z79.899 OTHER LONG TERM (CURRENT) DRUG THERAPY: ICD-10-CM

## 2024-10-28 DIAGNOSIS — Z79.01 LONG TERM (CURRENT) USE OF ANTICOAGULANTS: ICD-10-CM

## 2024-10-28 DIAGNOSIS — Z87.891 PERSONAL HISTORY OF NICOTINE DEPENDENCE: ICD-10-CM

## 2024-10-28 DIAGNOSIS — Z91.013 ALLERGY TO SEAFOOD: ICD-10-CM

## 2024-10-28 DIAGNOSIS — E11.40 TYPE 2 DIABETES MELLITUS WITH DIABETIC NEUROPATHY, UNSPECIFIED: ICD-10-CM

## 2024-10-28 DIAGNOSIS — E11.621 TYPE 2 DIABETES MELLITUS WITH FOOT ULCER: ICD-10-CM

## 2024-10-28 DIAGNOSIS — E11.59 TYPE 2 DIABETES MELLITUS WITH OTHER CIRCULATORY COMPLICATIONS: ICD-10-CM

## 2024-10-28 DIAGNOSIS — Z87.440 PERSONAL HISTORY OF URINARY (TRACT) INFECTIONS: ICD-10-CM

## 2024-10-28 DIAGNOSIS — E87.6 HYPOKALEMIA: ICD-10-CM

## 2024-10-28 DIAGNOSIS — D64.9 ANEMIA, UNSPECIFIED: ICD-10-CM

## 2024-10-28 DIAGNOSIS — L97.414 NON-PRESSURE CHRONIC ULCER OF RIGHT HEEL AND MIDFOOT WITH NECROSIS OF BONE: ICD-10-CM

## 2024-10-28 DIAGNOSIS — K59.00 CONSTIPATION, UNSPECIFIED: ICD-10-CM

## 2024-10-28 NOTE — ASSESSMENT
[Verbal] : Verbal [Written] : Written [Demo] : Demo [Patient] : Patient [Caregiver] : Caregiver [Home Health Provider] : Home Health Provider [Good - alert, interested, motivated] : Good - alert, interested, motivated [Verbalizes knowledge/Understanding] : Verbalizes knowledge/understanding [Dressing changes] : dressing changes [Foot Care] : foot care [Skin Care] : skin care [Signs and symptoms of infection] : sign and symptoms of infection [Nutrition] : nutrition [How and When to Call] : how and when to call [Pain Management] : pain management [Off-loading] : off-loading [Patient responsibility to plan of care] : patient responsibility to plan of care [Stable] : stable [Other: ____] : [unfilled] [Wheelchair] : Wheelchair [Not Applicable - Long Term Care/Home Health Agency] : Long Term Care/Home Health Agency: Not Applicable [] : No [FreeTextEntry2] : Infection prevention Localized wound care  Goal remaining pain free regarding wounds Offloading   Promote optimal skin care and nutrition. Skin substitute therapy   [FreeTextEntry4] : Auth submitted for removal of hypertrophic tissue.  Instructions provided to patients aide with clear directions to change outer dressing only on graft site. Supply script provided.  Auth submitted for next Apligraf  Pt states sacrum is almost healed and doesn't need to be assessed.  Follow up in 1 week

## 2024-10-28 NOTE — REVIEW OF SYSTEMS
[Skin Wound] : skin wound [Limb Weakness] : limb weakness [Easy Bleeding] : a tendency for easy bleeding [FreeTextEntry1] : 1128hr [Fever] : no fever [Chills] : no chills [Eye Pain] : no eye pain [Earache] : no earache [Loss Of Hearing] : no hearing loss [Chest Pain] : no chest pain [Shortness Of Breath] : no shortness of breath [Abdominal Pain] : no abdominal pain [Anxiety] : no anxiety [FreeTextEntry5] : htn, hld diabetic cardio vascular disease  [FreeTextEntry9] : s/p left foot TMA w/Achilles tenotomy [de-identified] : left foot DFU3, s/p TMA-  healed, right heel wound down to bone , s/p partial calcanectomy with (+) OM on path [de-identified] : diabetic neuropathy [de-identified] : IDDM with neuropathy

## 2024-10-28 NOTE — PRE-OP CHECKLIST - NOTHING BY MOUTH SINCE
28-May-2019 00:00 [FreeTextEntry1] : This is a 60-year-old man with past history of DM, HTN, HLD that presented to the emergency room from NINA BAEZ MD office after having shortness of breath, dyspnea on exertion. NINA Baez MD discussed with David Carmona MD, The recommendation was for the patient to be evaluated in the emergency room. Initially he was admitted to the observation unit. The patient had a stress test done showing an abnormal stress test and his EF is 13% so the patient was admitted to the hospital for evaluation. The patient went on to have a LHC/RHC which showed All vessels are severely calcified LM: Mild diffuse disease LAD: Prox: Mild diffuse disease. Mid: 85% stenosis Distal: severe diffuse disease   D1: Large vessel 90% stenosis LCX: Large vessel. Moderate diffuse disease.  LPL1: Medium size, severe diffuse disease.  LPL2 and LPDA: moderate diffuse disease RCA: small size. Proximal   LVEDP:28mmHg and RA: 7mmHg, RV: 73/-1/14 mmHg PA: 76/31/48 mmHg PCWP: 31/32/28 CO/CI: 5.1/ 2.2. Pt also had a TTE which showed an LVEF 21%  LVSF is severely decreased, severe grade 3 LVDD, moderately enlarged RV and moderately to severely reduced RVSF No pericardial effusion seen. PASP 59 mmHg. Pt has been started on GDMT and transferred to [place] and received and Impella assisted Rota/ cutting balloon/ZACH x 3 p/m/d LAD RFA PC x 2 of note, EF 20%, EDP 27.   HFrEF (heart failure with reduced ejection fraction)     - LVEF 21% on TTE and 13% on stress echo ---> Increased to 45% to now 51%  - Continue Entresto to  mg BID  - Continue Spironolactone 25 mg QD   - Continue Carvedilol 12.5 mg BID     - Continue Farxiga 10 mg QD  - Repeat Labs to be completed today after visit.  - continues to work out on his own. - Take your weight +BP daily and report weight gain of 3 lbs in one day or 5lbs in one week that is unrelieved by diuretic therapy. Notify office of HR <60 or BP systolic <90 for medication optimization. Limit your sodium intake and Keep fluids between 1.5-2L daily.  CAD (coronary artery disease)  s/p  Impella assisted PCI  - Recommend continuing Atorvastatin 20 mg QD   - Recommend Aspirin 81 mg QD       HTN (hypertension)      - Medication regimen as above    RTC in 6 months for Follow-up appointment with Dr. Cardona will need repeat TTE at that time (May obtain at Dr. Brunson office.) 28-May-2019 22:00

## 2024-10-28 NOTE — REVIEW OF SYSTEMS
[Skin Wound] : skin wound [Limb Weakness] : limb weakness [Easy Bleeding] : a tendency for easy bleeding [FreeTextEntry1] : 1128hr [Fever] : no fever [Chills] : no chills [Eye Pain] : no eye pain [Earache] : no earache [Loss Of Hearing] : no hearing loss [Chest Pain] : no chest pain [Shortness Of Breath] : no shortness of breath [Abdominal Pain] : no abdominal pain [Anxiety] : no anxiety [FreeTextEntry5] : htn, hld diabetic cardio vascular disease  [FreeTextEntry9] : s/p left foot TMA w/Achilles tenotomy [de-identified] : left foot DFU3, s/p TMA-  healed, right heel wound down to bone , s/p partial calcanectomy with (+) OM on path [de-identified] : diabetic neuropathy [de-identified] : IDDM with neuropathy

## 2024-10-28 NOTE — PROCEDURE
[Saline] : saline [Scalpel] : scalpel [Other: ___] : [unfilled] [Fenestrated] : fenestrated [Hydrated with saline] : hydrated with saline [Apligraf] : apligraf [____ % was used] : and [unfilled] % was used [FreeTextEntry1] : Alginate  [] : No [FreeTextEntry9] : 1152hr [de-identified] : DFU 3 posterior right heel  [de-identified] : Alissa [FreeTextEntry6] : DFU posterior right heel  [FreeTextEntry7] : same [de-identified] : 0 [de-identified] : 2cc [de-identified] : mathew

## 2024-10-28 NOTE — PHYSICAL EXAM
[Abdominal Pad] : Abdominal Pad [4 x 4] : 4 x 4  [2 x 2] : 2 x 2  [1+] : left 1+ [Ankle Swelling (On Exam)] : present [Ankle Swelling On The Left] : moderate [Skin Ulcer] : ulcer [Alert] : alert [Oriented to Person] : oriented to person [Oriented to Place] : oriented to place [Calm] : calm [Varicose Veins Of Lower Extremities] : not present [] : not present [de-identified] : calm [de-identified] : htn, hld, diabetic cardiovascular disease [de-identified] : s/p left TMA and Achilles tenotomy, pt has been non ambulatory since february  [de-identified] : left foot TMA - closed.  Right posterior heel wound of skin, subcutaneous tissue, fat with granulation tissue, serous drainage  [de-identified] : IDDM with neuropathy  [de-identified] : Outer dressing to be changed 3 times a week  [FreeTextEntry1] : Right heel  [FreeTextEntry2] : 11.0 [FreeTextEntry3] : 8.0 [FreeTextEntry4] : 0.1  [de-identified] : Serous/sanguinous [de-identified] : Apligraf  [de-identified] : Apligraf, Adaptic touch, Calcium alginate  [de-identified] : Mechanically cleansed with sterile gauze and normal saline. Kerlix  Post debridement measurements: 11.1/8.1/0.1 Bandage applied by DPM.   Current Tissue : Tissue Product:  BIO_TISSUE Tissue Type:  HUMAN Added On The Fly:  No Serial Number:  [id number] Lot Number:  [id number] Ext. Ref. Code:  721061 Expiration Date:  10/31/2024 Received Date:  10/21/2024 Tissue Status:  Implanted Tissue Size:  1 unit of 44sq/cm applied 100% used, 0% discarded   Irrigated with 0.9% Normal Saline  LOT: [id number] EXP: 5/8/26 [FreeTextEntry7] : right dorsal foot - 3 wounds within measurement  [FreeTextEntry8] : 2.6 [FreeTextEntry9] : 3.0 [de-identified] : 0.1 [de-identified] : Serous/sanguinous [de-identified] : 90% [de-identified] : Medihoney  [de-identified] : Mechanically cleansed with sterile gauze and normal saline. Kerlix  [de-identified] : Right medial leg - fragile barron wound  [de-identified] : Dry dressing  [de-identified] : Mechanically cleansed with sterile gauze and normal saline. Paper tape  [TWNoteComboBox4] : Moderate [de-identified] : Normal [de-identified] : None [de-identified] : None [de-identified] : 100% [de-identified] : No [TWNoteComboBox7] : Eduard [de-identified] : Application of skin substitute [de-identified] : Weekly [de-identified] : Primary Dressing [de-identified] : Small [de-identified] : Normal [de-identified] : None [de-identified] : None [de-identified] : <20% [de-identified] : Yes [de-identified] : 3x Weekly [de-identified] : Primary Dressing [de-identified] : None [de-identified] : Normal [de-identified] : None [de-identified] : None [de-identified] : No [de-identified] : 3x Weekly [de-identified] : Secondary Dressing

## 2024-10-28 NOTE — REASON FOR VISIT
[Apligraf] : apligraf [FreeTextEntry5] : Right heel  [FreeTextEntry4] : DFU posterior right heel  [FreeTextEntry3] : Spent 20 minutes for patient care and medical decision making.    granular

## 2024-10-28 NOTE — PROCEDURE
[Saline] : saline [Scalpel] : scalpel [Other: ___] : [unfilled] [Fenestrated] : fenestrated [Hydrated with saline] : hydrated with saline [Apligraf] : apligraf [____ % was used] : and [unfilled] % was used [FreeTextEntry1] : Alginate  [] : No [FreeTextEntry9] : 1152hr [de-identified] : Alissa [de-identified] : DFU 3 posterior right heel  [FreeTextEntry6] : DFU posterior right heel  [FreeTextEntry7] : same [de-identified] : 0 [de-identified] : 2cc [de-identified] : mathew

## 2024-10-28 NOTE — PHYSICAL EXAM
[Abdominal Pad] : Abdominal Pad [4 x 4] : 4 x 4  [2 x 2] : 2 x 2  [1+] : left 1+ [Ankle Swelling (On Exam)] : present [Ankle Swelling On The Left] : moderate [Skin Ulcer] : ulcer [Alert] : alert [Oriented to Person] : oriented to person [Oriented to Place] : oriented to place [Calm] : calm [Varicose Veins Of Lower Extremities] : not present [] : not present [de-identified] : calm [de-identified] : htn, hld, diabetic cardiovascular disease [de-identified] : s/p left TMA and Achilles tenotomy, pt has been non ambulatory since february  [de-identified] : left foot TMA - closed.  Right posterior heel wound of skin, subcutaneous tissue, fat with granulation tissue, serous drainage  [de-identified] : IDDM with neuropathy  [de-identified] : Outer dressing to be changed 3 times a week  [FreeTextEntry1] : Right heel  [FreeTextEntry2] : 11.0 [FreeTextEntry3] : 8.0 [FreeTextEntry4] : 0.1  [de-identified] : Serous/sanguinous [de-identified] : Apligraf  [de-identified] : Apligraf, Adaptic touch, Calcium alginate  [de-identified] : Mechanically cleansed with sterile gauze and normal saline. Kerlix  Post debridement measurements: 11.1/8.1/0.1 Bandage applied by DPM.   Current Tissue : Tissue Product:  BIO_TISSUE Tissue Type:  HUMAN Added On The Fly:  No Serial Number:  [id number] Lot Number:  [id number] Ext. Ref. Code:  [id number] Expiration Date:  10/31/2024 Received Date:  10/21/2024 Tissue Status:  Implanted Tissue Size:  1 unit of 44sq/cm applied 100% used, 0% discarded   Irrigated with 0.9% Normal Saline  LOT: [id number] EXP: 5/8/26 [FreeTextEntry7] : right dorsal foot - 3 wounds within measurement  [FreeTextEntry8] : 2.6 [FreeTextEntry9] : 3.0 [de-identified] : 0.1 [de-identified] : Serous/sanguinous [de-identified] : 90% [de-identified] : Medihoney  [de-identified] : Mechanically cleansed with sterile gauze and normal saline. Kerlix  [de-identified] : Right medial leg - fragile barron wound  [de-identified] : Dry dressing  [de-identified] : Mechanically cleansed with sterile gauze and normal saline. Paper tape  [TWNoteComboBox4] : Moderate [de-identified] : Normal [de-identified] : None [de-identified] : None [de-identified] : 100% [de-identified] : No [TWNoteComboBox7] : Eduard [de-identified] : Application of skin substitute [de-identified] : Weekly [de-identified] : Primary Dressing [de-identified] : Small [de-identified] : Normal [de-identified] : None [de-identified] : None [de-identified] : <20% [de-identified] : Yes [de-identified] : 3x Weekly [de-identified] : Primary Dressing [de-identified] : None [de-identified] : Normal [de-identified] : None [de-identified] : None [de-identified] : No [de-identified] : 3x Weekly [de-identified] : Secondary Dressing

## 2024-10-30 ENCOUNTER — APPOINTMENT (OUTPATIENT)
Dept: WOUND CARE | Facility: HOSPITAL | Age: 54
End: 2024-10-30
Payer: MEDICAID

## 2024-10-30 ENCOUNTER — INPATIENT (INPATIENT)
Facility: HOSPITAL | Age: 54
LOS: 7 days | Discharge: NURSING, REHAB OR LONG-TERM CARE | DRG: 639 | End: 2024-11-07
Attending: FAMILY MEDICINE | Admitting: FAMILY MEDICINE
Payer: MEDICAID

## 2024-10-30 ENCOUNTER — OUTPATIENT (OUTPATIENT)
Dept: OUTPATIENT SERVICES | Facility: HOSPITAL | Age: 54
LOS: 1 days | Discharge: ANOTHER HOSPITAL | End: 2024-10-30
Payer: MEDICAID

## 2024-10-30 ENCOUNTER — NON-APPOINTMENT (OUTPATIENT)
Age: 54
End: 2024-10-30

## 2024-10-30 VITALS
RESPIRATION RATE: 18 BRPM | BODY MASS INDEX: 28.23 KG/M2 | TEMPERATURE: 98.2 F | HEART RATE: 81 BPM | SYSTOLIC BLOOD PRESSURE: 111 MMHG | DIASTOLIC BLOOD PRESSURE: 77 MMHG | WEIGHT: 220 LBS | HEIGHT: 74 IN | OXYGEN SATURATION: 95 %

## 2024-10-30 VITALS
HEIGHT: 74 IN | SYSTOLIC BLOOD PRESSURE: 134 MMHG | TEMPERATURE: 103 F | DIASTOLIC BLOOD PRESSURE: 86 MMHG | OXYGEN SATURATION: 98 % | HEART RATE: 79 BPM | WEIGHT: 265 LBS | RESPIRATION RATE: 20 BRPM

## 2024-10-30 DIAGNOSIS — E11.621 TYPE 2 DIABETES MELLITUS WITH FOOT ULCER: ICD-10-CM

## 2024-10-30 DIAGNOSIS — I95.1 ORTHOSTATIC HYPOTENSION: ICD-10-CM

## 2024-10-30 DIAGNOSIS — L02.611 CUTANEOUS ABSCESS OF RIGHT FOOT: ICD-10-CM

## 2024-10-30 DIAGNOSIS — Z87.440 PERSONAL HISTORY OF URINARY (TRACT) INFECTIONS: ICD-10-CM

## 2024-10-30 DIAGNOSIS — K25.5 CHRONIC OR UNSPECIFIED GASTRIC ULCER WITH PERFORATION: Chronic | ICD-10-CM

## 2024-10-30 DIAGNOSIS — Z79.84 LONG TERM (CURRENT) USE OF ORAL HYPOGLYCEMIC DRUGS: ICD-10-CM

## 2024-10-30 DIAGNOSIS — L97.412 NON-PRESSURE CHRONIC ULCER OF RIGHT HEEL AND MIDFOOT WITH FAT LAYER EXPOSED: ICD-10-CM

## 2024-10-30 DIAGNOSIS — Z86.73 PERSONAL HISTORY OF TRANSIENT ISCHEMIC ATTACK (TIA), AND CEREBRAL INFARCTION WITHOUT RESIDUAL DEFICITS: ICD-10-CM

## 2024-10-30 DIAGNOSIS — E11.9 TYPE 2 DIABETES MELLITUS WITHOUT COMPLICATIONS: ICD-10-CM

## 2024-10-30 DIAGNOSIS — E87.6 HYPOKALEMIA: ICD-10-CM

## 2024-10-30 DIAGNOSIS — Z79.899 OTHER LONG TERM (CURRENT) DRUG THERAPY: ICD-10-CM

## 2024-10-30 DIAGNOSIS — L03.115 CELLULITIS OF RIGHT LOWER LIMB: ICD-10-CM

## 2024-10-30 DIAGNOSIS — L97.509 TYPE 2 DIABETES MELLITUS WITH FOOT ULCER: ICD-10-CM

## 2024-10-30 DIAGNOSIS — I48.91 UNSPECIFIED ATRIAL FIBRILLATION: ICD-10-CM

## 2024-10-30 DIAGNOSIS — D64.9 ANEMIA, UNSPECIFIED: ICD-10-CM

## 2024-10-30 DIAGNOSIS — K59.00 CONSTIPATION, UNSPECIFIED: ICD-10-CM

## 2024-10-30 DIAGNOSIS — I25.2 OLD MYOCARDIAL INFARCTION: ICD-10-CM

## 2024-10-30 DIAGNOSIS — E11.59 TYPE 2 DIABETES MELLITUS WITH OTHER CIRCULATORY COMPLICATIONS: ICD-10-CM

## 2024-10-30 DIAGNOSIS — E11.40 TYPE 2 DIABETES MELLITUS WITH DIABETIC NEUROPATHY, UNSPECIFIED: ICD-10-CM

## 2024-10-30 DIAGNOSIS — Z89.432 ACQUIRED ABSENCE OF LEFT FOOT: ICD-10-CM

## 2024-10-30 DIAGNOSIS — Z98.890 OTHER SPECIFIED POSTPROCEDURAL STATES: Chronic | ICD-10-CM

## 2024-10-30 DIAGNOSIS — Z79.01 LONG TERM (CURRENT) USE OF ANTICOAGULANTS: ICD-10-CM

## 2024-10-30 DIAGNOSIS — S98.912A COMPLETE TRAUMATIC AMPUTATION OF LEFT FOOT, LEVEL UNSPECIFIED, INITIAL ENCOUNTER: Chronic | ICD-10-CM

## 2024-10-30 DIAGNOSIS — E11.69 TYPE 2 DIABETES MELLITUS WITH OTHER SPECIFIED COMPLICATION: ICD-10-CM

## 2024-10-30 DIAGNOSIS — Z87.891 PERSONAL HISTORY OF NICOTINE DEPENDENCE: ICD-10-CM

## 2024-10-30 DIAGNOSIS — Z56.9 UNSPECIFIED PROBLEMS RELATED TO EMPLOYMENT: ICD-10-CM

## 2024-10-30 LAB
ALBUMIN SERPL ELPH-MCNC: 2.5 G/DL — LOW (ref 3.3–5)
ALP SERPL-CCNC: 127 U/L — HIGH (ref 40–120)
ALT FLD-CCNC: 15 U/L — SIGNIFICANT CHANGE UP (ref 12–78)
ANION GAP SERPL CALC-SCNC: 9 MMOL/L — SIGNIFICANT CHANGE UP (ref 5–17)
APTT BLD: 39.1 SEC — HIGH (ref 24.5–35.6)
AST SERPL-CCNC: 14 U/L — LOW (ref 15–37)
BASOPHILS # BLD AUTO: 0.05 K/UL — SIGNIFICANT CHANGE UP (ref 0–0.2)
BASOPHILS NFR BLD AUTO: 0.4 % — SIGNIFICANT CHANGE UP (ref 0–2)
BILIRUB SERPL-MCNC: 0.4 MG/DL — SIGNIFICANT CHANGE UP (ref 0.2–1.2)
BUN SERPL-MCNC: 14 MG/DL — SIGNIFICANT CHANGE UP (ref 7–23)
CALCIUM SERPL-MCNC: 8.9 MG/DL — SIGNIFICANT CHANGE UP (ref 8.5–10.1)
CHLORIDE SERPL-SCNC: 102 MMOL/L — SIGNIFICANT CHANGE UP (ref 96–108)
CO2 SERPL-SCNC: 28 MMOL/L — SIGNIFICANT CHANGE UP (ref 22–31)
CREAT SERPL-MCNC: 0.85 MG/DL — SIGNIFICANT CHANGE UP (ref 0.5–1.3)
EGFR: 102 ML/MIN/1.73M2 — SIGNIFICANT CHANGE UP
EOSINOPHIL # BLD AUTO: 0.63 K/UL — HIGH (ref 0–0.5)
EOSINOPHIL NFR BLD AUTO: 5.2 % — SIGNIFICANT CHANGE UP (ref 0–6)
ERYTHROCYTE [SEDIMENTATION RATE] IN BLOOD: 34 MM/HR — HIGH (ref 0–20)
GLUCOSE BLDC GLUCOMTR-MCNC: 155 MG/DL — HIGH (ref 70–99)
GLUCOSE BLDC GLUCOMTR-MCNC: 170 MG/DL — HIGH (ref 70–99)
GLUCOSE SERPL-MCNC: 106 MG/DL — HIGH (ref 70–99)
HCT VFR BLD CALC: 33.1 % — LOW (ref 39–50)
HGB BLD-MCNC: 10.4 G/DL — LOW (ref 13–17)
IMM GRANULOCYTES NFR BLD AUTO: 0.7 % — SIGNIFICANT CHANGE UP (ref 0–0.9)
INR BLD: 1.57 RATIO — HIGH (ref 0.85–1.16)
LACTATE SERPL-SCNC: 2.8 MMOL/L — HIGH (ref 0.7–2)
LACTATE SERPL-SCNC: 2.9 MMOL/L — HIGH (ref 0.7–2)
LYMPHOCYTES # BLD AUTO: 1.43 K/UL — SIGNIFICANT CHANGE UP (ref 1–3.3)
LYMPHOCYTES # BLD AUTO: 11.8 % — LOW (ref 13–44)
MCHC RBC-ENTMCNC: 29.8 PG — SIGNIFICANT CHANGE UP (ref 27–34)
MCHC RBC-ENTMCNC: 31.4 G/DL — LOW (ref 32–36)
MCV RBC AUTO: 94.8 FL — SIGNIFICANT CHANGE UP (ref 80–100)
MONOCYTES # BLD AUTO: 1.29 K/UL — HIGH (ref 0–0.9)
MONOCYTES NFR BLD AUTO: 10.6 % — SIGNIFICANT CHANGE UP (ref 2–14)
NEUTROPHILS # BLD AUTO: 8.63 K/UL — HIGH (ref 1.8–7.4)
NEUTROPHILS NFR BLD AUTO: 71.3 % — SIGNIFICANT CHANGE UP (ref 43–77)
NRBC # BLD: 0 /100 WBCS — SIGNIFICANT CHANGE UP (ref 0–0)
PLATELET # BLD AUTO: 457 K/UL — HIGH (ref 150–400)
POTASSIUM SERPL-MCNC: 4.3 MMOL/L — SIGNIFICANT CHANGE UP (ref 3.5–5.3)
POTASSIUM SERPL-SCNC: 4.3 MMOL/L — SIGNIFICANT CHANGE UP (ref 3.5–5.3)
PROCALCITONIN SERPL-MCNC: 0.05 NG/ML — SIGNIFICANT CHANGE UP
PROT SERPL-MCNC: 7 G/DL — SIGNIFICANT CHANGE UP (ref 6–8.3)
PROTHROM AB SERPL-ACNC: 18.5 SEC — HIGH (ref 9.9–13.4)
RBC # BLD: 3.49 M/UL — LOW (ref 4.2–5.8)
RBC # FLD: 15.6 % — HIGH (ref 10.3–14.5)
SODIUM SERPL-SCNC: 139 MMOL/L — SIGNIFICANT CHANGE UP (ref 135–145)
WBC # BLD: 12.12 K/UL — HIGH (ref 3.8–10.5)
WBC # FLD AUTO: 12.12 K/UL — HIGH (ref 3.8–10.5)

## 2024-10-30 PROCEDURE — 87075 CULTR BACTERIA EXCEPT BLOOD: CPT

## 2024-10-30 PROCEDURE — 93010 ELECTROCARDIOGRAM REPORT: CPT

## 2024-10-30 PROCEDURE — 73630 X-RAY EXAM OF FOOT: CPT | Mod: 26,RT

## 2024-10-30 PROCEDURE — 87070 CULTURE OTHR SPECIMN AEROBIC: CPT

## 2024-10-30 PROCEDURE — 87186 SC STD MICRODIL/AGAR DIL: CPT

## 2024-10-30 PROCEDURE — 99285 EMERGENCY DEPT VISIT HI MDM: CPT

## 2024-10-30 PROCEDURE — 10060 I&D ABSCESS SIMPLE/SINGLE: CPT

## 2024-10-30 PROCEDURE — 10061 I&D ABSCESS COMP/MULTIPLE: CPT

## 2024-10-30 PROCEDURE — 87077 CULTURE AEROBIC IDENTIFY: CPT

## 2024-10-30 PROCEDURE — 71045 X-RAY EXAM CHEST 1 VIEW: CPT | Mod: 26

## 2024-10-30 RX ORDER — TRAMADOL HYDROCHLORIDE 50 MG/1
50 TABLET, COATED ORAL EVERY 4 HOURS
Refills: 0 | Status: DISCONTINUED | OUTPATIENT
Start: 2024-10-30 | End: 2024-11-01

## 2024-10-30 RX ORDER — SODIUM CHLORIDE 9 MG/ML
1000 INJECTION, SOLUTION INTRAMUSCULAR; INTRAVENOUS; SUBCUTANEOUS ONCE
Refills: 0 | Status: COMPLETED | OUTPATIENT
Start: 2024-10-30 | End: 2024-10-30

## 2024-10-30 RX ORDER — PIPERACILLIN AND TAZOBACTAM .5; 4 G/20ML; G/20ML
3.38 INJECTION, POWDER, LYOPHILIZED, FOR SOLUTION INTRAVENOUS EVERY 8 HOURS
Refills: 0 | Status: DISCONTINUED | OUTPATIENT
Start: 2024-10-30 | End: 2024-11-05

## 2024-10-30 RX ORDER — VANCOMYCIN HYDROCHLORIDE 50 MG/ML
1000 KIT ORAL ONCE
Refills: 0 | Status: DISCONTINUED | OUTPATIENT
Start: 2024-10-30 | End: 2024-10-30

## 2024-10-30 RX ORDER — APIXABAN 5 MG/1
5 TABLET, FILM COATED ORAL EVERY 12 HOURS
Refills: 0 | Status: DISCONTINUED | OUTPATIENT
Start: 2024-10-30 | End: 2024-11-04

## 2024-10-30 RX ORDER — ASCORBIC ACID 500 MG
500 TABLET ORAL DAILY
Refills: 0 | Status: DISCONTINUED | OUTPATIENT
Start: 2024-10-30 | End: 2024-11-05

## 2024-10-30 RX ORDER — HYDROMORPHONE HCL/0.9% NACL/PF 6 MG/30 ML
1 PATIENT CONTROLLED ANALGESIA SYRINGE INTRAVENOUS EVERY 4 HOURS
Refills: 0 | Status: DISCONTINUED | OUTPATIENT
Start: 2024-10-30 | End: 2024-11-01

## 2024-10-30 RX ORDER — MIDODRINE HYDROCHLORIDE 2.5 MG/1
15 TABLET ORAL THREE TIMES A DAY
Refills: 0 | Status: DISCONTINUED | OUTPATIENT
Start: 2024-10-30 | End: 2024-11-05

## 2024-10-30 RX ORDER — PANTOPRAZOLE SODIUM 40 MG/1
40 TABLET, DELAYED RELEASE ORAL
Refills: 0 | Status: DISCONTINUED | OUTPATIENT
Start: 2024-10-30 | End: 2024-11-05

## 2024-10-30 RX ORDER — SODIUM CHLORIDE 9 MG/ML
1000 INJECTION, SOLUTION INTRAMUSCULAR; INTRAVENOUS; SUBCUTANEOUS
Refills: 0 | Status: DISCONTINUED | OUTPATIENT
Start: 2024-10-30 | End: 2024-11-04

## 2024-10-30 RX ORDER — ASPIRIN/MAG CARB/ALUMINUM AMIN 325 MG
81 TABLET ORAL DAILY
Refills: 0 | Status: DISCONTINUED | OUTPATIENT
Start: 2024-10-30 | End: 2024-11-04

## 2024-10-30 RX ORDER — SUCRALFATE 1 G/10ML
1 SUSPENSION ORAL
Refills: 0 | Status: DISCONTINUED | OUTPATIENT
Start: 2024-10-30 | End: 2024-11-05

## 2024-10-30 RX ORDER — PIPERACILLIN AND TAZOBACTAM .5; 4 G/20ML; G/20ML
3.38 INJECTION, POWDER, LYOPHILIZED, FOR SOLUTION INTRAVENOUS ONCE
Refills: 0 | Status: COMPLETED | OUTPATIENT
Start: 2024-10-30 | End: 2024-10-30

## 2024-10-30 RX ORDER — ACETAMINOPHEN 500 MG
650 TABLET ORAL EVERY 6 HOURS
Refills: 0 | Status: DISCONTINUED | OUTPATIENT
Start: 2024-10-30 | End: 2024-11-01

## 2024-10-30 RX ORDER — FERROUS SULFATE 325(65) MG
325 TABLET ORAL DAILY
Refills: 0 | Status: DISCONTINUED | OUTPATIENT
Start: 2024-10-30 | End: 2024-11-05

## 2024-10-30 RX ORDER — AMIODARONE HCL 200 MG
200 TABLET ORAL DAILY
Refills: 0 | Status: DISCONTINUED | OUTPATIENT
Start: 2024-10-30 | End: 2024-11-05

## 2024-10-30 RX ORDER — FAMOTIDINE 10 MG/ML
20 INJECTION INTRAVENOUS DAILY
Refills: 0 | Status: DISCONTINUED | OUTPATIENT
Start: 2024-10-30 | End: 2024-11-05

## 2024-10-30 RX ORDER — OXYCODONE AND ACETAMINOPHEN 7.5; 325 MG/1; MG/1
2 TABLET ORAL EVERY 4 HOURS
Refills: 0 | Status: DISCONTINUED | OUTPATIENT
Start: 2024-10-30 | End: 2024-11-01

## 2024-10-30 RX ORDER — VANCOMYCIN HYDROCHLORIDE 50 MG/ML
1250 KIT ORAL EVERY 12 HOURS
Refills: 0 | Status: DISCONTINUED | OUTPATIENT
Start: 2024-10-31 | End: 2024-11-01

## 2024-10-30 RX ORDER — GLUCAGON INJECTION, SOLUTION 1 MG/.2ML
1 INJECTION, SOLUTION SUBCUTANEOUS ONCE
Refills: 0 | Status: DISCONTINUED | OUTPATIENT
Start: 2024-10-30 | End: 2024-11-05

## 2024-10-30 RX ORDER — TIOTROPIUM BROMIDE AND OLODATEROL 3.124; 2.736 UG/1; UG/1
2 SPRAY, METERED RESPIRATORY (INHALATION) DAILY
Refills: 0 | Status: DISCONTINUED | OUTPATIENT
Start: 2024-10-30 | End: 2024-11-05

## 2024-10-30 RX ORDER — INSULIN LISPRO 100/ML
VIAL (ML) SUBCUTANEOUS
Refills: 0 | Status: DISCONTINUED | OUTPATIENT
Start: 2024-10-30 | End: 2024-11-05

## 2024-10-30 RX ORDER — OXYBUTYNIN CHLORIDE 5 MG/1
10 TABLET ORAL AT BEDTIME
Refills: 0 | Status: DISCONTINUED | OUTPATIENT
Start: 2024-10-30 | End: 2024-11-05

## 2024-10-30 RX ORDER — CETIRIZINE HCL 10 MG/1
10 TABLET ORAL DAILY
Refills: 0 | Status: DISCONTINUED | OUTPATIENT
Start: 2024-10-30 | End: 2024-11-05

## 2024-10-30 RX ORDER — OXYBUTYNIN CHLORIDE 5 MG/1
10 TABLET ORAL DAILY
Refills: 0 | Status: DISCONTINUED | OUTPATIENT
Start: 2024-10-30 | End: 2024-10-30

## 2024-10-30 RX ORDER — MELATONIN 5 MG
5 TABLET ORAL AT BEDTIME
Refills: 0 | Status: DISCONTINUED | OUTPATIENT
Start: 2024-10-30 | End: 2024-11-05

## 2024-10-30 RX ORDER — INSULIN LISPRO 100/ML
VIAL (ML) SUBCUTANEOUS AT BEDTIME
Refills: 0 | Status: DISCONTINUED | OUTPATIENT
Start: 2024-10-30 | End: 2024-11-05

## 2024-10-30 RX ORDER — METOPROLOL TARTRATE 50 MG
100 TABLET ORAL EVERY 12 HOURS
Refills: 0 | Status: DISCONTINUED | OUTPATIENT
Start: 2024-10-30 | End: 2024-11-05

## 2024-10-30 RX ORDER — VANCOMYCIN HYDROCHLORIDE 50 MG/ML
1500 KIT ORAL ONCE
Refills: 0 | Status: COMPLETED | OUTPATIENT
Start: 2024-10-30 | End: 2024-10-30

## 2024-10-30 RX ORDER — POLYETHYLENE GLYCOL 3350 17 G/17G
17 POWDER, FOR SOLUTION ORAL DAILY
Refills: 0 | Status: DISCONTINUED | OUTPATIENT
Start: 2024-10-30 | End: 2024-11-05

## 2024-10-30 RX ORDER — DIGOXIN 250 MCG
125 TABLET ORAL DAILY
Refills: 0 | Status: DISCONTINUED | OUTPATIENT
Start: 2024-10-30 | End: 2024-11-05

## 2024-10-30 RX ORDER — SENNA 187 MG
2 TABLET ORAL AT BEDTIME
Refills: 0 | Status: DISCONTINUED | OUTPATIENT
Start: 2024-10-30 | End: 2024-11-05

## 2024-10-30 RX ORDER — ACETAMINOPHEN 500 MG
650 TABLET ORAL ONCE
Refills: 0 | Status: COMPLETED | OUTPATIENT
Start: 2024-10-30 | End: 2024-10-30

## 2024-10-30 RX ORDER — NALOXEGOL OXALATE 12.5 MG/1
25 TABLET, FILM COATED ORAL DAILY
Refills: 0 | Status: DISCONTINUED | OUTPATIENT
Start: 2024-10-30 | End: 2024-11-05

## 2024-10-30 RX ORDER — GABAPENTIN 300 MG/1
100 CAPSULE ORAL EVERY 12 HOURS
Refills: 0 | Status: DISCONTINUED | OUTPATIENT
Start: 2024-10-30 | End: 2024-11-02

## 2024-10-30 RX ADMIN — VANCOMYCIN HYDROCHLORIDE 300 MILLIGRAM(S): KIT at 14:42

## 2024-10-30 RX ADMIN — SODIUM CHLORIDE 1000 MILLILITER(S): 9 INJECTION, SOLUTION INTRAMUSCULAR; INTRAVENOUS; SUBCUTANEOUS at 16:10

## 2024-10-30 RX ADMIN — APIXABAN 5 MILLIGRAM(S): 5 TABLET, FILM COATED ORAL at 17:41

## 2024-10-30 RX ADMIN — Medication 1 MILLIGRAM(S): at 17:51

## 2024-10-30 RX ADMIN — Medication 1 MILLIGRAM(S): at 23:13

## 2024-10-30 RX ADMIN — Medication 1 MILLIGRAM(S): at 18:51

## 2024-10-30 RX ADMIN — GABAPENTIN 100 MILLIGRAM(S): 300 CAPSULE ORAL at 17:41

## 2024-10-30 RX ADMIN — Medication 40 MILLIGRAM(S): at 21:37

## 2024-10-30 RX ADMIN — Medication 2 TABLET(S): at 21:37

## 2024-10-30 RX ADMIN — PIPERACILLIN AND TAZOBACTAM 25 GRAM(S): .5; 4 INJECTION, POWDER, LYOPHILIZED, FOR SOLUTION INTRAVENOUS at 18:49

## 2024-10-30 RX ADMIN — PIPERACILLIN AND TAZOBACTAM 200 GRAM(S): .5; 4 INJECTION, POWDER, LYOPHILIZED, FOR SOLUTION INTRAVENOUS at 13:17

## 2024-10-30 RX ADMIN — SUCRALFATE 1 GRAM(S): 1 SUSPENSION ORAL at 17:41

## 2024-10-30 RX ADMIN — Medication 5 MILLIGRAM(S): at 21:38

## 2024-10-30 RX ADMIN — MIDODRINE HYDROCHLORIDE 15 MILLIGRAM(S): 2.5 TABLET ORAL at 17:41

## 2024-10-30 RX ADMIN — Medication 500 MILLIGRAM(S): at 17:41

## 2024-10-30 RX ADMIN — Medication 100 MILLIGRAM(S): at 17:40

## 2024-10-30 RX ADMIN — Medication 1 MILLIGRAM(S): at 22:43

## 2024-10-30 RX ADMIN — OXYBUTYNIN CHLORIDE 10 MILLIGRAM(S): 5 TABLET ORAL at 21:37

## 2024-10-30 RX ADMIN — Medication 650 MILLIGRAM(S): at 13:18

## 2024-10-30 RX ADMIN — Medication 1: at 17:40

## 2024-10-30 RX ADMIN — SODIUM CHLORIDE 1000 MILLILITER(S): 9 INJECTION, SOLUTION INTRAMUSCULAR; INTRAVENOUS; SUBCUTANEOUS at 13:10

## 2024-10-30 SDOH — ECONOMIC STABILITY - INCOME SECURITY: UNSPECIFIED PROBLEMS RELATED TO EMPLOYMENT: Z56.9

## 2024-10-30 NOTE — ED ADULT NURSE NOTE - OBJECTIVE STATEMENT
Pt is AOX4, came to the ED sent from wound care of R foot wound. Pt states abscess was drained and packed by wound care MD and was told to come to ER for IV ABT tx. Pt denies chills, 102T in triage. Denies CP/SOB/HA. Denies n/v/dizziness. Denies ABD pain. Denies numbness/tingling. Pt is able to ambulate with wheel chair. Pending lab and radiology results. care ongoing.

## 2024-10-30 NOTE — H&P ADULT - NSHPPHYSICALEXAM_GEN_ALL_CORE
gen no overt distress  heent poor dentition  neck supple, no jvd  lungs cta  cor rrr s1s2  abd soft, nt  ext right lower ext dressing c/d/i  neuro a and o x 3, non focal exam  derm neg

## 2024-10-30 NOTE — ED PROVIDER NOTE - OBJECTIVE STATEMENT
55yo male sent from wound care with fever, and infected wound to right foot, drained in the office and sent for culture, pt has hx of recurrent infections, pt also has neuropathy and has no feeling in his foot

## 2024-10-30 NOTE — PATIENT PROFILE ADULT - FUNCTIONAL ASSESSMENT - BASIC MOBILITY 6.
1-calculated by average/Not able to assess (calculate score using [facility] averaging method)

## 2024-10-30 NOTE — PATIENT PROFILE ADULT - FALL HARM RISK - UNIVERSAL INTERVENTIONS
Bed in lowest position, wheels locked, appropriate side rails in place/Call bell, personal items and telephone in reach/Instruct patient to call for assistance before getting out of bed or chair/Non-slip footwear when patient is out of bed/[place] to call system/Physically safe environment - no spills, clutter or unnecessary equipment/Purposeful Proactive Rounding/Room/bathroom lighting operational, light cord in reach

## 2024-10-30 NOTE — CONSULT NOTE ADULT - ASSESSMENT
[facility]  Chart Reviewed-Full Consult to follow for any immediate concerns please fell free to contact us directly at  [phone] and have us paged or text my cell # [phone]  Juventino Whitten MD PhD

## 2024-10-30 NOTE — ED ADULT NURSE REASSESSMENT NOTE - NS ED NURSE REASSESS COMMENT FT1
Patient changed into a hospital gown.  Aguilera catheter changed under sterile procedure.  Patient tolerated procedure well.

## 2024-10-30 NOTE — H&P ADULT - PROBLEM SELECTOR PLAN 1
s/p I and D at wound care center today  follow up cultures  IV abx per ID  ID eval dr Whitten et al  podiatry eval appreciated

## 2024-10-30 NOTE — H&P ADULT - HISTORY OF PRESENT ILLNESS
55yo male sent from wound care with fever, and infected wound to right foot, drained in the office and sent for culture, pt has hx of recurrent infections,   complains of severe r heel pain at present time

## 2024-10-30 NOTE — CONSULT NOTE ADULT - PROBLEM SELECTOR RECOMMENDATION 9
Patient was seen at the wound care center and the abscess was drained, soft tissue culture obtained   Given the patient's history recommend patient to be admitted for IV antibiotics and further evaluation  Will obtain bloodwork   Recommend IV antibiotics per ID  Will continue local wound care and offloading and monitor the wound   Patient not a candidate for MRI

## 2024-10-30 NOTE — ED ADULT NURSE NOTE - NSFALLHARMRISKINTERV_ED_ALL_ED

## 2024-10-30 NOTE — ED ADULT TRIAGE NOTE - CHIEF COMPLAINT QUOTE
I have chills since last night and a fever.  I have had cellulitis LLE I think, I have had it before.  I noticed that there is a scratch on the inner left leg.  maybe it is from that.  I took tylenol at around 0800 this am.  generalized weakness  denies any abdominal pain, vomiting, stomach pain or diarrhea.  difficulty walking due to generalized weakness and some pain LLE

## 2024-10-30 NOTE — PATIENT PROFILE ADULT - NS PRO AD NO ADVANCE DIRECTIVE
Patient : Benjamin Moreno Age: 71 year old Sex: male   MRN: [id number] Encounter Date: 7/8/2018  P05/P5     History     Chief Complaint   Patient presents with   • Penis/Scrotum Problem     HPI  A [place] language  was used during the encounter.   1:16 PM Benjamin Moreno is a 71 year old male who presents to the ED c/o right inguinal pain (5/10 in severity) with an associated right scrotal \"lump\" which began about 10 days ago. He reports \"inflammation\" in the area of pain. No erythema, warmth, or discharge. The pt also reports chronic constipation but his last bowel movement was this morning. The pt denies N/V/D, dysuria, hematuria, CP, SOB, abd pain, or any other associated sx. The pt took ibuprofen this morning with improvement in his pain. There are no other modifying factors.    PCP: Non-Ah Pcp    Allergies   Allergen Reactions   • No Known Allergies Other (See Comments)       Prior to Admission Medications    ASPIRIN (ECOTRIN) 81 MG EC TABLET    Take 81 mg by mouth daily.    ATENOLOL (TENORMIN) 100 MG TABLET    Take 100 mg by mouth daily.    DOXAZOSIN (CARDURA) 8 MG TABLET    Take 8 mg by mouth nightly.    HYDROCODONE-ACETAMINOPHEN (NORCO) 5-325 MG PER TABLET    Take 1-2 tablets by mouth every 4 hours as needed for Pain.    IBUPROFEN (MOTRIN) 400 MG TABLET    Take 400 mg by mouth every 6 hours as needed for Pain.    LISINOPRIL-HYDROCHLOROTHIAZIDE (PRINZIDE,ZESTORETIC) 10-12.5 MG PER TABLET    Take 1 tablet by mouth daily.    OMEPRAZOLE (PRILOSEC) 40 MG CAPSULE    Take 40 mg by mouth daily.    PREGABALIN (LYRICA) 75 MG CAPSULE    Take 75 mg by mouth 2 times daily.    SUCRALFATE (CARAFATE) 1 G TABLET    Take 1 tablet by mouth 4 times daily.    TAMSULOSIN (FLOMAX) 0.4 MG CAP    Take 0.4 mg by mouth daily after a meal.    TRAMADOL (ULTRAM) 50 MG TABLET    Take 1 tablet by mouth every 4 hours as needed for Pain.       Past Medical History:   Diagnosis Date   • Essential (primary) hypertension    •  Gastroesophageal reflux disease     d/t hernia   • Urinary incontinence     frequency/urgency       Past Surgical History:   Procedure Laterality Date   • ABDOMEN SURGERY     • CHOLECYSTECTOMY     • HERNIA REPAIR      2007, 2x   • HERNIA REPAIR     • PROSTATE SURGERY       Family History: No pertinent history.    Social History   Substance Use Topics   • Smoking status: Never Smoker   • Smokeless tobacco: Never Used   • Alcohol use No       Review of Systems   Constitutional: Negative for chills, diaphoresis and fever.   HENT: Negative for congestion, rhinorrhea and sinus pressure.    Eyes: Negative for visual disturbance.   Respiratory: Negative for shortness of breath.    Cardiovascular: Negative for chest pain and leg swelling.   Gastrointestinal: Negative for abdominal pain, constipation, diarrhea, nausea and vomiting.   Genitourinary: Negative for dysuria and urgency.        Positive for right inguinal pain and right scrotal \"lump.\"   Musculoskeletal: Negative for arthralgias and myalgias.   Skin: Negative for rash.   Neurological: Negative for dizziness, weakness and headaches.   Hematological: Does not bruise/bleed easily.   Psychiatric/Behavioral: Negative for confusion. The patient is not nervous/anxious.        Physical Exam     ED Triage Vitals [07/08/18 1239]   ED Triage Vitals Group      Temp 98.6 °F (37 °C)      Pulse 68      Resp 20      /85      SpO2 98 %      EtCO2 mmHg       Height 5' 5\" (1.651 m)      Weight 235 lb (106.6 kg)      Weight Scale Used ED Stated       Physical Exam   Constitutional: He is oriented to person, place, and time. He appears well-developed and well-nourished.  Non-toxic appearance. He does not have a sickly appearance. He does not appear ill. No distress.   HENT:   Head: Normocephalic and atraumatic.   Nose: Nose normal.   Mouth/Throat: Uvula is midline, oropharynx is clear and moist and mucous membranes are normal. Mucous membranes are not dry. No trismus in the jaw.  No dental caries. No oropharyngeal exudate, posterior oropharyngeal edema or posterior oropharyngeal erythema.   Eyes: Conjunctivae and lids are normal. Right conjunctiva is not injected. Left conjunctiva is not injected. Right eye exhibits normal extraocular motion. Left eye exhibits normal extraocular motion.   Neck: Phonation normal.   Cardiovascular: Normal rate, regular rhythm, S1 normal, S2 normal and normal heart sounds.  Exam reveals no gallop and no friction rub.    No murmur heard.  Pulmonary/Chest: Effort normal and breath sounds normal. No stridor. No apnea and no tachypnea. No respiratory distress. He has no decreased breath sounds. He has no wheezes. He has no rhonchi. He has no rales. He exhibits no tenderness.   Abdominal: Soft. Normal appearance, normal aorta and bowel sounds are normal. He exhibits no distension and no ascites. There is no tenderness. There is no rebound, no guarding and no CVA tenderness. Hernia confirmed negative in the right inguinal area and confirmed negative in the left inguinal area.   Genitourinary: Penis normal. Circumcised.   Genitourinary Comments: The pt has bilateral testicular tenderness (L>R). No masses. No scrotal lesions or erythema. No crepitus. No inguinal lymphadenopathy.   Musculoskeletal: Normal range of motion. He exhibits no edema or tenderness.   Neurological: He is alert and oriented to person, place, and time. He has normal strength. He displays no atrophy. No cranial nerve deficit or sensory deficit. He exhibits normal muscle tone.   Skin: Skin is warm and dry. No rash noted. He is not diaphoretic. No erythema. No pallor.   Psychiatric: He has a normal mood and affect. His speech is normal and behavior is normal.   Nursing note and vitals reviewed.        Procedures    Lab Results     Results for orders placed or performed during the hospital encounter of 07/08/18   Urinalysis & Reflex Micro with Culture if Indicated   Result Value Ref Range    COLOR  YELLOW YELLOW    APPEARANCE CLEAR     GLUCOSE(URINE) NEGATIVE NEGATIVE mg/dL    BILIRUBIN NEGATIVE NEGATIVE    KETONES NEGATIVE NEGATIVE mg/dL    SPECIFIC GRAVITY 1.010 1.005 - 1.030    BLOOD MODERATE (A) NEGATIVE    pH 5.5 5.0 - 7.0 Units    PROTEIN(URINE) NEGATIVE NEGATIVE mg/dL    UROBILINOGEN 0.2 0.0 - 1.0 mg/dL    NITRITE NEGATIVE NEGATIVE    LEUKOCYTE ESTERASE NEGATIVE NEGATIVE    SPECIMEN TYPE URINE, CLEAN CATCH/MIDSTREAM     Squamous EPI'S NONE SEEN 0 - 5 /hpf    RBC 1 to 3 0 - 3 /hpf    WBC NONE SEEN 0 - 5 /hpf    BACTERIA NONE SEEN NONE SEEN /hpf    Hyaline Casts NONE SEEN 0 - 5 /lpf       Radiology Results     Imaging Results          US Testicles and Scrotum W Duplex (Final result)  Result time 07/08/18 16:04:21    Final result                 Impression:    IMPRESSION:    1.  Small bilateral hydroceles.  2.  Bilateral epididymal cysts. A dominant right epididymal cyst is mildly  complex.               Narrative:    EXAM: US TESTICLES AND SCROTUM W DUPLEX    DATE/TIME: 7/8/2018 3:29 PM.     INDICATION: 71-year-old male presenting with right testicular pain.    COMPARISON: CT abdomen pelvis without contrast obtained 38 minutes prior.    TECHNIQUE: A sonogram of the testicles and scrotum was obtained assessing  grayscale appearance and color Doppler flow.    FINDINGS:     Right Testicle:  The right testicle is normal in echotexture and size, measuring 3.7 x 2.5 x  1.8 cm. No right testicular masses or cysts. Blood flow to the right  testicle is within limits of normal. Small right hydrocele.     Left Testicle:  The left testicle is normal in echotexture and size, measuring 4.0 x 2.9 x  1.8 cm. No left testicular masses or cysts. Blood flow to the left testicle  is within limits of normal. Small left hydrocele.     Right Epididymis:  The right epididymis is normal in size. The right epididymis is mildly  heterogeneous and contains multiple punctate calcifications. Multiple right  epididymal cysts are  present, the largest of which measures 0.8 x 0.9 x 0.8  cm and contains internal debris. No right epididymal masses. Blood flow to  the right epididymis is within limits of normal.    Left Epididymis:  The left epididymis is normal in size. The left epididymis is mildly  heterogeneous and contains multiple punctate calcifications. A 0.4 x 0.3 x  0.2 cm left epididymal cyst is present. No left epididymal masses. Blood  flow to the left epididymis is within limits of normal.    Additional Findings:  No varicoceles. No scrotal skin thickening. Focused sonographic evaluation  of the right groin in the region of the patient's pain is unremarkable.                               CT ABDOMEN PELVIS FOR KIDNEY STONES (Final result)  Result time 07/08/18 14:29:49    Final result                 Impression:    IMPRESSION:  No urolithiasis or hydronephrosis. No acute findings.               Narrative:    CT ABDOMEN PELVIS FOR KIDNEY STONES WO CONTRAST    HISTORY: FLANK PAIN, STONE KNOWN OR SUSPECTED, hematuria, left testicular  pain    COMPARISON: 10/22/2016    TECHNIQUE: Helical CT of the abdomen and pelvis was performed without  contrast with triplanar reconstructions.    FINDINGS:     Lower Chest: Mild atelectasis and partially visualized coronary  calcification.    Evaluation is limited by lack of intravenous contrast.    Liver: Within normal limits.  Biliary: Within normal limits.  Gallbladder: Within normal limits.  Pancreas: Within normal limits.  Spleen: Within normal limits.  Kidneys: Within normal limits.  Adrenals: Within normal limits.    Bowel/Mesentery: Diverticulosis.  Pelvis: Within normal limits. Nondistended bladder.    Vessels: Mild calcific atherosclerosis.  Bones/Soft Tissues: No suspicious lesion. Sclerotic focus in the right  intertrochanteric region is unchanged thousand 16 and likely a benign bone  island.                                ED Medication Orders     None          MDM  Vitals:    07/08/18 1239    BP: 181/85   Pulse: 68   Resp: 20   Temp: 98.6 °F (37 °C)   TempSrc: Oral   SpO2: 98%   Weight: 106.6 kg   Height: 5' 5\" (1.651 m)         ED Course     1:16 PM  I performed the initial evaluation of the pt. The pt presents to the ED with right inguinal pain and scrotal \"lump\" for the past 10 days. No warmth, erythema, or discharge. The pt's initial vital signs are stable other than an initial BP of 181/85 mm Hg. I informed the pt that there are no palpable masses or abscesses on physical examination. I updated the pt on the plan for evaluation with a scrotal/testicular US to assess for evidence of infection, torsion, or other  abnormalities. The pt is declining analgesia at this time. The pt is agreeable with the plan for evaluation.    4:27 PM  I rechecked the pt. I updated the pt that the abd CT was negative for nephrolithiasis or ureterolithiasis and that the scrotum/testicular US showed bilateral hydroceles and bilateral epididymal cysts. I informed the pt of the plan of care. I recommended that the pt f/u with urology. I advised the pt to return to the ED for any new or worsening sx. The pt understands and agrees with the plan.  All questions answered.    MDM       Critical Care time spent on this patient outside of billable procedures:  None    Clinical Impression  ED Diagnoses        Final diagnoses    Testicle pain     Hydrocele, bilateral     Epididymal cyst     Microscopic hematuria           Follow Up:  Family Physician    Call in 1 day  for follow up appointment within 7 days    Gabriel Sosa MD  [address]  [facility] [address]  [facility] [address]  [phone]    Call in 1 day  for follow up appointment regarding your pain and blood in the urine       The patient was provided with a recommendation to follow up with a primary care provider and obtain reassessment of his blood pressure within three months.      Pt is discharged in stable condition      I have reviewed the information recorded  by the scribe for accuracy and agree with its contents.  Danny Hand acting as scribe for Dr. Kayley Hand    Physician: Kayley Hand DO  Physician #: [id number]  Scribe: Danny aHnd DO  07/11/18 0813     No

## 2024-10-31 PROBLEM — L02.611 FOOT ABSCESS, RIGHT: Status: ACTIVE | Noted: 2024-10-31

## 2024-10-31 LAB
ANION GAP SERPL CALC-SCNC: 5 MMOL/L — SIGNIFICANT CHANGE UP (ref 5–17)
APPEARANCE UR: CLEAR — SIGNIFICANT CHANGE UP
BASOPHILS # BLD AUTO: 0.05 K/UL — SIGNIFICANT CHANGE UP (ref 0–0.2)
BASOPHILS NFR BLD AUTO: 0.6 % — SIGNIFICANT CHANGE UP (ref 0–2)
BILIRUB UR-MCNC: NEGATIVE — SIGNIFICANT CHANGE UP
BUN SERPL-MCNC: 12 MG/DL — SIGNIFICANT CHANGE UP (ref 7–23)
CALCIUM SERPL-MCNC: 8.7 MG/DL — SIGNIFICANT CHANGE UP (ref 8.5–10.1)
CHLORIDE SERPL-SCNC: 106 MMOL/L — SIGNIFICANT CHANGE UP (ref 96–108)
CO2 SERPL-SCNC: 28 MMOL/L — SIGNIFICANT CHANGE UP (ref 22–31)
COLOR SPEC: YELLOW — SIGNIFICANT CHANGE UP
CREAT SERPL-MCNC: 0.64 MG/DL — SIGNIFICANT CHANGE UP (ref 0.5–1.3)
CRP SERPL-MCNC: 67 MG/L — HIGH
DIFF PNL FLD: NEGATIVE — SIGNIFICANT CHANGE UP
EGFR: 111 ML/MIN/1.73M2 — SIGNIFICANT CHANGE UP
EOSINOPHIL # BLD AUTO: 0.66 K/UL — HIGH (ref 0–0.5)
EOSINOPHIL NFR BLD AUTO: 7.3 % — HIGH (ref 0–6)
GLUCOSE BLDC GLUCOMTR-MCNC: 131 MG/DL — HIGH (ref 70–99)
GLUCOSE BLDC GLUCOMTR-MCNC: 179 MG/DL — HIGH (ref 70–99)
GLUCOSE BLDC GLUCOMTR-MCNC: 194 MG/DL — HIGH (ref 70–99)
GLUCOSE BLDC GLUCOMTR-MCNC: 210 MG/DL — HIGH (ref 70–99)
GLUCOSE SERPL-MCNC: 125 MG/DL — HIGH (ref 70–99)
GLUCOSE UR QL: NEGATIVE — SIGNIFICANT CHANGE UP
GRAM STN FLD: ABNORMAL
HCT VFR BLD CALC: 29.9 % — LOW (ref 39–50)
HGB BLD-MCNC: 9.2 G/DL — LOW (ref 13–17)
IMM GRANULOCYTES NFR BLD AUTO: 0.7 % — SIGNIFICANT CHANGE UP (ref 0–0.9)
KETONES UR-MCNC: NEGATIVE — SIGNIFICANT CHANGE UP
LACTATE SERPL-SCNC: 0.8 MMOL/L — SIGNIFICANT CHANGE UP (ref 0.7–2)
LEUKOCYTE ESTERASE UR-ACNC: ABNORMAL
LYMPHOCYTES # BLD AUTO: 1.13 K/UL — SIGNIFICANT CHANGE UP (ref 1–3.3)
LYMPHOCYTES # BLD AUTO: 12.5 % — LOW (ref 13–44)
MCHC RBC-ENTMCNC: 29.1 PG — SIGNIFICANT CHANGE UP (ref 27–34)
MCHC RBC-ENTMCNC: 30.8 G/DL — LOW (ref 32–36)
MCV RBC AUTO: 94.6 FL — SIGNIFICANT CHANGE UP (ref 80–100)
MONOCYTES # BLD AUTO: 1.22 K/UL — HIGH (ref 0–0.9)
MONOCYTES NFR BLD AUTO: 13.5 % — SIGNIFICANT CHANGE UP (ref 2–14)
NEUTROPHILS # BLD AUTO: 5.93 K/UL — SIGNIFICANT CHANGE UP (ref 1.8–7.4)
NEUTROPHILS NFR BLD AUTO: 65.4 % — SIGNIFICANT CHANGE UP (ref 43–77)
NITRITE UR-MCNC: NEGATIVE — SIGNIFICANT CHANGE UP
NRBC # BLD: 0 /100 WBCS — SIGNIFICANT CHANGE UP (ref 0–0)
PH UR: 7.5 — SIGNIFICANT CHANGE UP (ref 5–8)
PLATELET # BLD AUTO: 430 K/UL — HIGH (ref 150–400)
POTASSIUM SERPL-MCNC: 3.8 MMOL/L — SIGNIFICANT CHANGE UP (ref 3.5–5.3)
POTASSIUM SERPL-SCNC: 3.8 MMOL/L — SIGNIFICANT CHANGE UP (ref 3.5–5.3)
PREALB SERPL-MCNC: 9 MG/DL — LOW (ref 20–40)
PROT UR-MCNC: NEGATIVE — SIGNIFICANT CHANGE UP
RBC # BLD: 3.16 M/UL — LOW (ref 4.2–5.8)
RBC # FLD: 15.2 % — HIGH (ref 10.3–14.5)
SODIUM SERPL-SCNC: 139 MMOL/L — SIGNIFICANT CHANGE UP (ref 135–145)
SP GR SPEC: 1.01 — SIGNIFICANT CHANGE UP (ref 1.01–1.02)
SPECIMEN SOURCE: SIGNIFICANT CHANGE UP
UROBILINOGEN FLD QL: 0.2 — SIGNIFICANT CHANGE UP
WBC # BLD: 9.05 K/UL — SIGNIFICANT CHANGE UP (ref 3.8–10.5)
WBC # FLD AUTO: 9.05 K/UL — SIGNIFICANT CHANGE UP (ref 3.8–10.5)

## 2024-10-31 PROCEDURE — 99232 SBSQ HOSP IP/OBS MODERATE 35: CPT | Mod: 24

## 2024-10-31 RX ADMIN — Medication 1 MILLIGRAM(S): at 05:49

## 2024-10-31 RX ADMIN — Medication 2: at 16:45

## 2024-10-31 RX ADMIN — Medication 1: at 11:45

## 2024-10-31 RX ADMIN — SUCRALFATE 1 GRAM(S): 1 SUSPENSION ORAL at 17:38

## 2024-10-31 RX ADMIN — FAMOTIDINE 20 MILLIGRAM(S): 10 INJECTION INTRAVENOUS at 11:44

## 2024-10-31 RX ADMIN — Medication 1 MILLIGRAM(S): at 06:19

## 2024-10-31 RX ADMIN — NALOXEGOL OXALATE 25 MILLIGRAM(S): 12.5 TABLET, FILM COATED ORAL at 11:44

## 2024-10-31 RX ADMIN — Medication 5 MILLIGRAM(S): at 22:27

## 2024-10-31 RX ADMIN — Medication 1 MILLIGRAM(S): at 16:30

## 2024-10-31 RX ADMIN — OXYBUTYNIN CHLORIDE 10 MILLIGRAM(S): 5 TABLET ORAL at 22:27

## 2024-10-31 RX ADMIN — Medication 100 MILLIGRAM(S): at 05:49

## 2024-10-31 RX ADMIN — PIPERACILLIN AND TAZOBACTAM 25 GRAM(S): .5; 4 INJECTION, POWDER, LYOPHILIZED, FOR SOLUTION INTRAVENOUS at 13:14

## 2024-10-31 RX ADMIN — Medication 325 MILLIGRAM(S): at 11:44

## 2024-10-31 RX ADMIN — POLYETHYLENE GLYCOL 3350 17 GRAM(S): 17 POWDER, FOR SOLUTION ORAL at 11:43

## 2024-10-31 RX ADMIN — MIDODRINE HYDROCHLORIDE 15 MILLIGRAM(S): 2.5 TABLET ORAL at 17:38

## 2024-10-31 RX ADMIN — Medication 1 MILLIGRAM(S): at 11:02

## 2024-10-31 RX ADMIN — Medication 81 MILLIGRAM(S): at 11:44

## 2024-10-31 RX ADMIN — GABAPENTIN 100 MILLIGRAM(S): 300 CAPSULE ORAL at 17:37

## 2024-10-31 RX ADMIN — Medication 1 MILLIGRAM(S): at 23:00

## 2024-10-31 RX ADMIN — APIXABAN 5 MILLIGRAM(S): 5 TABLET, FILM COATED ORAL at 17:37

## 2024-10-31 RX ADMIN — Medication 2 TABLET(S): at 22:25

## 2024-10-31 RX ADMIN — Medication 100 MILLIGRAM(S): at 17:38

## 2024-10-31 RX ADMIN — TIOTROPIUM BROMIDE AND OLODATEROL 2 PUFF(S): 3.124; 2.736 SPRAY, METERED RESPIRATORY (INHALATION) at 05:49

## 2024-10-31 RX ADMIN — MIDODRINE HYDROCHLORIDE 15 MILLIGRAM(S): 2.5 TABLET ORAL at 11:44

## 2024-10-31 RX ADMIN — Medication 1 MILLIGRAM(S): at 15:30

## 2024-10-31 RX ADMIN — Medication 200 MILLIGRAM(S): at 05:49

## 2024-10-31 RX ADMIN — PANTOPRAZOLE SODIUM 40 MILLIGRAM(S): 40 TABLET, DELAYED RELEASE ORAL at 05:58

## 2024-10-31 RX ADMIN — VANCOMYCIN HYDROCHLORIDE 166.67 MILLIGRAM(S): KIT at 15:25

## 2024-10-31 RX ADMIN — CETIRIZINE HCL 10 MILLIGRAM(S): 10 TABLET ORAL at 11:44

## 2024-10-31 RX ADMIN — Medication 1 MILLIGRAM(S): at 12:02

## 2024-10-31 RX ADMIN — Medication 500 MILLIGRAM(S): at 11:44

## 2024-10-31 RX ADMIN — GABAPENTIN 100 MILLIGRAM(S): 300 CAPSULE ORAL at 05:49

## 2024-10-31 RX ADMIN — Medication 1 MILLIGRAM(S): at 22:24

## 2024-10-31 RX ADMIN — PIPERACILLIN AND TAZOBACTAM 25 GRAM(S): .5; 4 INJECTION, POWDER, LYOPHILIZED, FOR SOLUTION INTRAVENOUS at 05:49

## 2024-10-31 RX ADMIN — Medication 40 MILLIGRAM(S): at 22:26

## 2024-10-31 RX ADMIN — VANCOMYCIN HYDROCHLORIDE 166.67 MILLIGRAM(S): KIT at 01:40

## 2024-10-31 RX ADMIN — MIDODRINE HYDROCHLORIDE 15 MILLIGRAM(S): 2.5 TABLET ORAL at 05:49

## 2024-10-31 RX ADMIN — SUCRALFATE 1 GRAM(S): 1 SUSPENSION ORAL at 05:48

## 2024-10-31 RX ADMIN — APIXABAN 5 MILLIGRAM(S): 5 TABLET, FILM COATED ORAL at 05:49

## 2024-10-31 RX ADMIN — PIPERACILLIN AND TAZOBACTAM 25 GRAM(S): .5; 4 INJECTION, POWDER, LYOPHILIZED, FOR SOLUTION INTRAVENOUS at 22:28

## 2024-10-31 RX ADMIN — Medication 125 MICROGRAM(S): at 05:48

## 2024-10-31 NOTE — PROVIDER CONTACT NOTE (CRITICAL VALUE NOTIFICATION) - TEST AND RESULT REPORTED:
Tissue Culture from 10/30/24- Numerous gram negative rods seen per oil power field, Few gram positive cocci in pairs seen per oil power field, Rare polymorphic nuclear leukocytes seen per low power field
Lactate

## 2024-10-31 NOTE — PROGRESS NOTE ADULT - SUBJECTIVE AND OBJECTIVE BOX
Date of Service 10-31-24 @ 14:58    Patient is a 54y old  Male who presents with a chief complaint of infected heel (31 Oct 2024 09:34)      INTERVAL /OVERNIGHT EVENTS: feeling ok    MEDICATIONS  (STANDING):  aMIOdarone    Tablet 200 milliGRAM(s) Oral daily  apixaban 5 milliGRAM(s) Oral every 12 hours  ascorbic acid 500 milliGRAM(s) Oral daily  aspirin  chewable 81 milliGRAM(s) Oral daily  atorvastatin 40 milliGRAM(s) Oral at bedtime  cetirizine 10 milliGRAM(s) Oral daily  dextrose 5%. 1000 milliLiter(s) (100 mL/Hr) IV Continuous <Continuous>  dextrose 5%. 1000 milliLiter(s) (50 mL/Hr) IV Continuous <Continuous>  dextrose 50% Injectable 25 Gram(s) IV Push once  dextrose 50% Injectable 12.5 Gram(s) IV Push once  dextrose 50% Injectable 25 Gram(s) IV Push once  dextrose Oral Gel 15 Gram(s) Oral once  digoxin     Tablet 125 MICROGram(s) Oral daily  famotidine    Tablet 20 milliGRAM(s) Oral daily  ferrous    sulfate 325 milliGRAM(s) Oral daily  gabapentin 100 milliGRAM(s) Oral every 12 hours  glucagon  Injectable 1 milliGRAM(s) IntraMuscular once  insulin lispro (ADMELOG) corrective regimen sliding scale   SubCutaneous three times a day before meals  insulin lispro (ADMELOG) corrective regimen sliding scale   SubCutaneous at bedtime  melatonin 5 milliGRAM(s) Oral at bedtime  metoprolol succinate  milliGRAM(s) Oral every 12 hours  midodrine. 15 milliGRAM(s) Oral three times a day  naloxegol 25 milliGRAM(s) Oral daily  oxybutynin XL 10 milliGRAM(s) Oral at bedtime  pantoprazole    Tablet 40 milliGRAM(s) Oral before breakfast  piperacillin/tazobactam IVPB.. 3.375 Gram(s) IV Intermittent every 8 hours  polyethylene glycol 3350 17 Gram(s) Oral daily  senna 2 Tablet(s) Oral at bedtime  sodium chloride 0.9%. 1000 milliLiter(s) (100 mL/Hr) IV Continuous <Continuous>  sucralfate 1 Gram(s) Oral two times a day  tiotropium 2.5 MICROgram(s)/olodaterol 2.5 MICROgram(s) (STIOLTO) Inhaler 2 Puff(s) Inhalation daily  vancomycin  IVPB 1250 milliGRAM(s) IV Intermittent every 12 hours    MEDICATIONS  (PRN):  acetaminophen     Tablet .. 650 milliGRAM(s) Oral every 6 hours PRN Temp greater or equal to 38C (100.4F)  HYDROmorphone  Injectable 1 milliGRAM(s) IV Push every 4 hours PRN Severe Pain (7 - 10)  oxycodone    5 mG/acetaminophen 325 mG 2 Tablet(s) Oral every 4 hours PRN Moderate Pain (4 - 6)  traMADol 50 milliGRAM(s) Oral every 4 hours PRN Mild Pain (1 - 3)      Allergies    fish (Hives)  ertapenem (Blisters; Rash)    Intolerances        REVIEW OF SYSTEMS:  denies    Vital Signs Last 24 Hrs  T(C): 36.8 (31 Oct 2024 12:08), Max: 37.1 (30 Oct 2024 17:07)  T(F): 98.2 (31 Oct 2024 12:08), Max: 98.7 (30 Oct 2024 17:07)  HR: 67 (31 Oct 2024 12:08) (67 - 81)  BP: 125/79 (31 Oct 2024 12:08) (102/61 - 125/79)  BP(mean): --  RR: 18 (31 Oct 2024 12:08) (18 - 19)  SpO2: 94% (31 Oct 2024 12:08) (93% - 96%)    Parameters below as of 31 Oct 2024 12:08  Patient On (Oxygen Delivery Method): room air        PHYSICAL EXAM:  GENERAL: NAD, well-groomed, well-developed  HEAD:  Atraumatic, Normocephalic  EYES: EOMI, PERRLA, conjunctiva and sclera clear  ENMT: No tonsillar erythema, exudates, or enlargement; Moist mucous membranes, Good dentition, No lesions  NECK: Supple, No JVD, Normal thyroid  NERVOUS SYSTEM:  Alert & Oriented X3, Good concentration; Motor Strength 5/5 B/L upper and lower extremities; DTRs 2+ intact and symmetric  CHEST/LUNG: Clear to auscultation bilaterally; No rales, rhonchi, wheezing, or rubs  HEART: Regular rate and rhythm; No murmurs, rubs, or gallops  ABDOMEN: Soft, Nontender, Nondistended; Bowel sounds present  EXTREMITIES:  2+ Peripheral Pulses, No clubbing, cyanosis, or edema, LE diabetic wounds  LYMPH: No lymphadenopathy noted  SKIN: No rashes or lesions    LABS:                        9.2    9.05  )-----------( 430      ( 31 Oct 2024 07:08 )             29.9     31 Oct 2024 07:08    139    |  106    |  12     ----------------------------<  125    3.8     |  28     |  0.64     Ca    8.7        31 Oct 2024 07:08      PT/INR - ( 30 Oct 2024 13:00 )   PT: 18.5 sec;   INR: 1.57 ratio         PTT - ( 30 Oct 2024 13:00 )  PTT:39.1 sec  Urinalysis Basic - ( 31 Oct 2024 07:08 )    Color: x / Appearance: x / SG: x / pH: x  Gluc: 125 mg/dL / Ketone: x  / Bili: x / Urobili: x   Blood: x / Protein: x / Nitrite: x   Leuk Esterase: x / RBC: x / WBC x   Sq Epi: x / Non Sq Epi: x / Bacteria: x      CAPILLARY BLOOD GLUCOSE      POCT Blood Glucose.: 194 mg/dL (31 Oct 2024 11:39)  POCT Blood Glucose.: 131 mg/dL (31 Oct 2024 07:51)  POCT Blood Glucose.: 170 mg/dL (30 Oct 2024 21:32)  POCT Blood Glucose.: 155 mg/dL (30 Oct 2024 17:29)      RADIOLOGY & ADDITIONAL TESTS:    Notes Reviewed:  [ x] YES  [ ] NO    Care Discussed with Consultants/Other Providers [x ] YES  [ ] NO

## 2024-10-31 NOTE — CONSULT NOTE ADULT - ASSESSMENT
54m with dm, cad, pad, dvt atrial fib, hypertension, stroke, indwelling jacques, PE, indwelling jacques, prior diagnosis of Right Heel Osteomyelitis  Culture - Wound Aerobic/Anaerobic (09.05.24 @ 12:55):  Escherichia coli ESBL (-  Piperacillin/Tazobactam: R <=8), Citrobacter freundii, Providencia stuartii sent from wound care with fever, and infected wound to right foot, drained in the office and sent for culture, pt has hx of recurrent infections and severe reaction to ertapenem.    RECOMMENDATIONS  Right heel abscess with surrounding cellulitis  apparently pt not a candidate for MRI so could consider CT with IV contrast but would coordinate with podiatry regarding any potential impact on site control  challenging antimicrobial Rx with severe carbapenem intolerance so rec  Zosyn started 10/30-continue  Vancomycin started 10/30-continue with dosing per pharmacy protocol    Thank you for consulting us and involving us in the management of this most interesting and challenging case.  We will follow along in the care of this patient. Please call us at [phone] or text me directly on my cell# at [phone] with any concerns.

## 2024-10-31 NOTE — PROGRESS NOTE ADULT - SUBJECTIVE AND OBJECTIVE BOX
54y year old Male seen at [facility] 1EAS 112 W1 for plantar right heel ulcer and midfoot abscess.   Denies any fever, chills, nausea, vomiting, chest pain, shortness of breath, or calf pain at this time.    Allergies    fish (Hives)  ertapenem (Blisters; Rash)    Intolerances        MEDICATIONS  (STANDING):  aMIOdarone    Tablet 200 milliGRAM(s) Oral daily  apixaban 5 milliGRAM(s) Oral every 12 hours  ascorbic acid 500 milliGRAM(s) Oral daily  aspirin  chewable 81 milliGRAM(s) Oral daily  atorvastatin 40 milliGRAM(s) Oral at bedtime  cetirizine 10 milliGRAM(s) Oral daily  dextrose 5%. 1000 milliLiter(s) (50 mL/Hr) IV Continuous <Continuous>  dextrose 5%. 1000 milliLiter(s) (100 mL/Hr) IV Continuous <Continuous>  dextrose 50% Injectable 25 Gram(s) IV Push once  dextrose 50% Injectable 12.5 Gram(s) IV Push once  dextrose 50% Injectable 25 Gram(s) IV Push once  dextrose Oral Gel 15 Gram(s) Oral once  digoxin     Tablet 125 MICROGram(s) Oral daily  famotidine    Tablet 20 milliGRAM(s) Oral daily  ferrous    sulfate 325 milliGRAM(s) Oral daily  gabapentin 100 milliGRAM(s) Oral every 12 hours  glucagon  Injectable 1 milliGRAM(s) IntraMuscular once  insulin lispro (ADMELOG) corrective regimen sliding scale   SubCutaneous at bedtime  insulin lispro (ADMELOG) corrective regimen sliding scale   SubCutaneous three times a day before meals  melatonin 5 milliGRAM(s) Oral at bedtime  metoprolol succinate  milliGRAM(s) Oral every 12 hours  midodrine. 15 milliGRAM(s) Oral three times a day  naloxegol 25 milliGRAM(s) Oral daily  oxybutynin XL 10 milliGRAM(s) Oral at bedtime  pantoprazole    Tablet 40 milliGRAM(s) Oral before breakfast  piperacillin/tazobactam IVPB.. 3.375 Gram(s) IV Intermittent every 8 hours  polyethylene glycol 3350 17 Gram(s) Oral daily  senna 2 Tablet(s) Oral at bedtime  sodium chloride 0.9%. 1000 milliLiter(s) (100 mL/Hr) IV Continuous <Continuous>  sucralfate 1 Gram(s) Oral two times a day  tiotropium 2.5 MICROgram(s)/olodaterol 2.5 MICROgram(s) (STIOLTO) Inhaler 2 Puff(s) Inhalation daily  vancomycin  IVPB 1250 milliGRAM(s) IV Intermittent every 12 hours    MEDICATIONS  (PRN):  acetaminophen     Tablet .. 650 milliGRAM(s) Oral every 6 hours PRN Temp greater or equal to 38C (100.4F)  HYDROmorphone  Injectable 1 milliGRAM(s) IV Push every 4 hours PRN Severe Pain (7 - 10)  oxycodone    5 mG/acetaminophen 325 mG 2 Tablet(s) Oral every 4 hours PRN Moderate Pain (4 - 6)  traMADol 50 milliGRAM(s) Oral every 4 hours PRN Mild Pain (1 - 3)      Vital Signs Last 24 Hrs  T(C): 36.8 (31 Oct 2024 12:08), Max: 36.8 (31 Oct 2024 12:08)  T(F): 98.2 (31 Oct 2024 12:08), Max: 98.2 (31 Oct 2024 12:08)  HR: 79 (31 Oct 2024 17:32) (67 - 79)  BP: 115/67 (31 Oct 2024 17:32) (102/61 - 125/79)  BP(mean): --  RR: 17 (31 Oct 2024 17:32) (17 - 18)  SpO2: 95% (31 Oct 2024 17:32) (94% - 96%)    Parameters below as of 31 Oct 2024 17:32  Patient On (Oxygen Delivery Method): room air        PHYSICAL EXAM:  Vascular: DP & PT non palpable bilaterally, Capillary refill 3 seconds, Digital hair absent bilaterally  Neurological: Light touch sensation diminished bilaterally  Musculoskeletal: 3/5 strength in all quadrants to the right and 4/5 to the elft , s/p partial calcanectomy of the right heel   Dermatological:  Right plantar midfoot 1.5cm x 0.7cm x 1.0cm midfoot s/p Incision and drainage of the abscess at the wound care center and  with periwound erythema and edema, with 1.0 cc of seropurulent drainage   14.0 cm x 7.6cm x 0.3 cm  heel wound noted with  granular wound bed, no probe to bone, no periwound erythema, no fluctuance, no malodor, no proximal streaking at this time    CBC Full  -  ( 31 Oct 2024 07:08 )  WBC Count : 9.05 K/uL  RBC Count : 3.16 M/uL  Hemoglobin : 9.2 g/dL  Hematocrit : 29.9 %  Platelet Count - Automated : 430 K/uL  Mean Cell Volume : 94.6 fl  Mean Cell Hemoglobin : 29.1 pg  Mean Cell Hemoglobin Concentration : 30.8 g/dL  Auto Neutrophil # : 5.93 K/uL  Auto Lymphocyte # : 1.13 K/uL  Auto Monocyte # : 1.22 K/uL  Auto Eosinophil # : 0.66 K/uL  Auto Basophil # : 0.05 K/uL  Auto Neutrophil % : 65.4 %  Auto Lymphocyte % : 12.5 %  Auto Monocyte % : 13.5 %  Auto Eosinophil % : 7.3 %  Auto Basophil % : 0.6 %                          9.2    9.05  )-----------( 430      ( 31 Oct 2024 07:08 )             29.9   10-31    139  |  106  |  12  ----------------------------<  125[H]  3.8   |  28  |  0.64    Ca    8.7      31 Oct 2024 07:08    TPro  7.0  /  Alb  2.5[L]  /  TBili  0.4  /  DBili  x   /  AST  14[L]  /  ALT  15  /  AlkPhos  127[H]  10-30      PT/INR - ( 30 Oct 2024 13:00 )   PT: 18.5 sec;   INR: 1.57 ratio         PTT - ( 30 Oct 2024 13:00 )  PTT:39.1 sec      Culture - Tissue with Gram Stain (collected 30 Oct 2024 11:09)  Source: Tissue  Gram Stain (31 Oct 2024 07:00):    Rare polymorphonuclear leukocytes seen per low power field    Numerous Gram Negative Rods seen per oil power field    Few Gram positive cocci in pairs seen per oil power field        Imaging: ----------

## 2024-10-31 NOTE — CARE COORDINATION ASSESSMENT. - NSCAREPROVIDERS_GEN_ALL_CORE_FT
CARE PROVIDERS:  Accepting Physician: Hill Brizuela  Administration: Venu Estrada  Administration: Luis Carlos Main  Admitting: Hill Brizuela  Attending: Hill Brizuela  Case Management: Hanna Marie  Consultant: Juventino Whitten  Consultant: Perlman, Daryl  Consultant: Tessa Jewell  Covering Team: Neymar Fofana  ED Attending: Ashley Chaudhary  ED Nurse: Josseline Infante  Nurse: Faith Bush  Ordered: Doctor, Unknown  Ordered: ServiceAccount, SCMMLM  Ordered: ServiceAccount, SCMMLM  Outpatient Provider: Grisel Murray  Override: Anila Kaleigh  Override: Ana M Mancera  PCA/Nursing Assistant: Susan Boland  PCA/Nursing Assistant: Sharlene Bey  Physical Therapy: Sri Gamboa  Physical Therapy: Koko Gómez  Physical Therapy: Delphine Sin  Registered Dietitian: Kari Chou  : Teri Angulo

## 2024-10-31 NOTE — CARE COORDINATION ASSESSMENT. - OTHER PERTINENT DISCHARGE PLANNING INFORMATION:
pta pt is a ltc pt at [facility]. pt been at [facility] long term care 2019. Pt is wheelchair bound at [facility]. Pt appears to be coping well with hospitalization. Pt has supportive sister in the community. plan remains for pt to ret to [facility] on dc. Per admissions at [facility], pt does not need auth to return on dc. plan: ret to [facility] ltc.

## 2024-11-01 LAB
ANION GAP SERPL CALC-SCNC: 7 MMOL/L — SIGNIFICANT CHANGE UP (ref 5–17)
BUN SERPL-MCNC: 10 MG/DL — SIGNIFICANT CHANGE UP (ref 7–23)
CALCIUM SERPL-MCNC: 8.6 MG/DL — SIGNIFICANT CHANGE UP (ref 8.5–10.1)
CHLORIDE SERPL-SCNC: 105 MMOL/L — SIGNIFICANT CHANGE UP (ref 96–108)
CO2 SERPL-SCNC: 28 MMOL/L — SIGNIFICANT CHANGE UP (ref 22–31)
CREAT SERPL-MCNC: 0.84 MG/DL — SIGNIFICANT CHANGE UP (ref 0.5–1.3)
CULTURE RESULTS: NO GROWTH — SIGNIFICANT CHANGE UP
EGFR: 102 ML/MIN/1.73M2 — SIGNIFICANT CHANGE UP
GLUCOSE BLDC GLUCOMTR-MCNC: 191 MG/DL — HIGH (ref 70–99)
GLUCOSE BLDC GLUCOMTR-MCNC: 219 MG/DL — HIGH (ref 70–99)
GLUCOSE BLDC GLUCOMTR-MCNC: 224 MG/DL — HIGH (ref 70–99)
GLUCOSE BLDC GLUCOMTR-MCNC: 250 MG/DL — HIGH (ref 70–99)
GLUCOSE SERPL-MCNC: 225 MG/DL — HIGH (ref 70–99)
HCT VFR BLD CALC: 30.3 % — LOW (ref 39–50)
HGB BLD-MCNC: 9.7 G/DL — LOW (ref 13–17)
MCHC RBC-ENTMCNC: 29.8 PG — SIGNIFICANT CHANGE UP (ref 27–34)
MCHC RBC-ENTMCNC: 32 G/DL — SIGNIFICANT CHANGE UP (ref 32–36)
MCV RBC AUTO: 93.2 FL — SIGNIFICANT CHANGE UP (ref 80–100)
NRBC # BLD: 0 /100 WBCS — SIGNIFICANT CHANGE UP (ref 0–0)
PLATELET # BLD AUTO: 463 K/UL — HIGH (ref 150–400)
POTASSIUM SERPL-MCNC: 4 MMOL/L — SIGNIFICANT CHANGE UP (ref 3.5–5.3)
POTASSIUM SERPL-SCNC: 4 MMOL/L — SIGNIFICANT CHANGE UP (ref 3.5–5.3)
RBC # BLD: 3.25 M/UL — LOW (ref 4.2–5.8)
RBC # FLD: 15.1 % — HIGH (ref 10.3–14.5)
SODIUM SERPL-SCNC: 140 MMOL/L — SIGNIFICANT CHANGE UP (ref 135–145)
SPECIMEN SOURCE: SIGNIFICANT CHANGE UP
VANCOMYCIN TROUGH SERPL-MCNC: 23.4 UG/ML — HIGH (ref 10–20)
WBC # BLD: 9.79 K/UL — SIGNIFICANT CHANGE UP (ref 3.8–10.5)
WBC # FLD AUTO: 9.79 K/UL — SIGNIFICANT CHANGE UP (ref 3.8–10.5)

## 2024-11-01 PROCEDURE — 99232 SBSQ HOSP IP/OBS MODERATE 35: CPT | Mod: 24

## 2024-11-01 RX ORDER — TRAMADOL HYDROCHLORIDE 50 MG/1
25 TABLET, COATED ORAL EVERY 4 HOURS
Refills: 0 | Status: DISCONTINUED | OUTPATIENT
Start: 2024-11-01 | End: 2024-11-02

## 2024-11-01 RX ORDER — ONDANSETRON HYDROCHLORIDE 2 MG/ML
4 INJECTION, SOLUTION INTRAMUSCULAR; INTRAVENOUS EVERY 8 HOURS
Refills: 0 | Status: DISCONTINUED | OUTPATIENT
Start: 2024-11-01 | End: 2024-11-05

## 2024-11-01 RX ORDER — OXYCODONE HYDROCHLORIDE 30 MG/1
10 TABLET ORAL
Refills: 0 | Status: DISCONTINUED | OUTPATIENT
Start: 2024-11-01 | End: 2024-11-02

## 2024-11-01 RX ORDER — TRAMADOL HYDROCHLORIDE 50 MG/1
50 TABLET, COATED ORAL EVERY 4 HOURS
Refills: 0 | Status: DISCONTINUED | OUTPATIENT
Start: 2024-11-01 | End: 2024-11-02

## 2024-11-01 RX ORDER — HYDROMORPHONE HCL/0.9% NACL/PF 6 MG/30 ML
0.2 PATIENT CONTROLLED ANALGESIA SYRINGE INTRAVENOUS ONCE
Refills: 0 | Status: DISCONTINUED | OUTPATIENT
Start: 2024-11-01 | End: 2024-11-01

## 2024-11-01 RX ORDER — OXYCODONE HYDROCHLORIDE 30 MG/1
5 TABLET ORAL EVERY 4 HOURS
Refills: 0 | Status: DISCONTINUED | OUTPATIENT
Start: 2024-11-01 | End: 2024-11-01

## 2024-11-01 RX ADMIN — Medication 0.2 MILLIGRAM(S): at 19:37

## 2024-11-01 RX ADMIN — SUCRALFATE 1 GRAM(S): 1 SUSPENSION ORAL at 05:14

## 2024-11-01 RX ADMIN — Medication 40 MILLIGRAM(S): at 21:51

## 2024-11-01 RX ADMIN — CETIRIZINE HCL 10 MILLIGRAM(S): 10 TABLET ORAL at 11:55

## 2024-11-01 RX ADMIN — FAMOTIDINE 20 MILLIGRAM(S): 10 INJECTION INTRAVENOUS at 11:55

## 2024-11-01 RX ADMIN — Medication 2: at 17:02

## 2024-11-01 RX ADMIN — OXYCODONE HYDROCHLORIDE 5 MILLIGRAM(S): 30 TABLET ORAL at 16:26

## 2024-11-01 RX ADMIN — OXYCODONE HYDROCHLORIDE 5 MILLIGRAM(S): 30 TABLET ORAL at 15:26

## 2024-11-01 RX ADMIN — Medication 0.2 MILLIGRAM(S): at 20:07

## 2024-11-01 RX ADMIN — OXYCODONE HYDROCHLORIDE 10 MILLIGRAM(S): 30 TABLET ORAL at 16:37

## 2024-11-01 RX ADMIN — VANCOMYCIN HYDROCHLORIDE 166.67 MILLIGRAM(S): KIT at 05:20

## 2024-11-01 RX ADMIN — PIPERACILLIN AND TAZOBACTAM 25 GRAM(S): .5; 4 INJECTION, POWDER, LYOPHILIZED, FOR SOLUTION INTRAVENOUS at 05:19

## 2024-11-01 RX ADMIN — Medication 100 MILLIGRAM(S): at 05:17

## 2024-11-01 RX ADMIN — PANTOPRAZOLE SODIUM 40 MILLIGRAM(S): 40 TABLET, DELAYED RELEASE ORAL at 05:14

## 2024-11-01 RX ADMIN — MIDODRINE HYDROCHLORIDE 15 MILLIGRAM(S): 2.5 TABLET ORAL at 11:55

## 2024-11-01 RX ADMIN — Medication 1 MILLIGRAM(S): at 10:46

## 2024-11-01 RX ADMIN — Medication 1 MILLIGRAM(S): at 11:46

## 2024-11-01 RX ADMIN — Medication 325 MILLIGRAM(S): at 11:55

## 2024-11-01 RX ADMIN — TIOTROPIUM BROMIDE AND OLODATEROL 2 PUFF(S): 3.124; 2.736 SPRAY, METERED RESPIRATORY (INHALATION) at 05:20

## 2024-11-01 RX ADMIN — GABAPENTIN 100 MILLIGRAM(S): 300 CAPSULE ORAL at 17:03

## 2024-11-01 RX ADMIN — GABAPENTIN 100 MILLIGRAM(S): 300 CAPSULE ORAL at 05:18

## 2024-11-01 RX ADMIN — Medication 2: at 07:53

## 2024-11-01 RX ADMIN — Medication 5 MILLIGRAM(S): at 21:51

## 2024-11-01 RX ADMIN — Medication 200 MILLIGRAM(S): at 05:18

## 2024-11-01 RX ADMIN — Medication 1 MILLIGRAM(S): at 05:16

## 2024-11-01 RX ADMIN — OXYCODONE HYDROCHLORIDE 10 MILLIGRAM(S): 30 TABLET ORAL at 23:32

## 2024-11-01 RX ADMIN — OXYBUTYNIN CHLORIDE 10 MILLIGRAM(S): 5 TABLET ORAL at 21:51

## 2024-11-01 RX ADMIN — Medication 81 MILLIGRAM(S): at 11:55

## 2024-11-01 RX ADMIN — APIXABAN 5 MILLIGRAM(S): 5 TABLET, FILM COATED ORAL at 17:04

## 2024-11-01 RX ADMIN — Medication 2: at 11:56

## 2024-11-01 RX ADMIN — ONDANSETRON HYDROCHLORIDE 4 MILLIGRAM(S): 2 INJECTION, SOLUTION INTRAMUSCULAR; INTRAVENOUS at 08:22

## 2024-11-01 RX ADMIN — PIPERACILLIN AND TAZOBACTAM 25 GRAM(S): .5; 4 INJECTION, POWDER, LYOPHILIZED, FOR SOLUTION INTRAVENOUS at 21:51

## 2024-11-01 RX ADMIN — Medication 125 MICROGRAM(S): at 05:19

## 2024-11-01 RX ADMIN — POLYETHYLENE GLYCOL 3350 17 GRAM(S): 17 POWDER, FOR SOLUTION ORAL at 11:56

## 2024-11-01 RX ADMIN — APIXABAN 5 MILLIGRAM(S): 5 TABLET, FILM COATED ORAL at 05:18

## 2024-11-01 RX ADMIN — MIDODRINE HYDROCHLORIDE 15 MILLIGRAM(S): 2.5 TABLET ORAL at 17:07

## 2024-11-01 RX ADMIN — Medication 500 MILLIGRAM(S): at 11:55

## 2024-11-01 RX ADMIN — Medication 2 TABLET(S): at 21:51

## 2024-11-01 RX ADMIN — PIPERACILLIN AND TAZOBACTAM 25 GRAM(S): .5; 4 INJECTION, POWDER, LYOPHILIZED, FOR SOLUTION INTRAVENOUS at 13:28

## 2024-11-01 RX ADMIN — SUCRALFATE 1 GRAM(S): 1 SUSPENSION ORAL at 17:04

## 2024-11-01 RX ADMIN — NALOXEGOL OXALATE 25 MILLIGRAM(S): 12.5 TABLET, FILM COATED ORAL at 11:56

## 2024-11-01 RX ADMIN — Medication 100 MILLIGRAM(S): at 17:07

## 2024-11-01 RX ADMIN — MIDODRINE HYDROCHLORIDE 15 MILLIGRAM(S): 2.5 TABLET ORAL at 05:18

## 2024-11-01 NOTE — PROGRESS NOTE ADULT - SUBJECTIVE AND OBJECTIVE BOX
[facility] DIVISION of INFECTIOUS DISEASE  Juventino Whitten MD PhD, Symone Hickey MD, Anne-Marie Li MD, Jeffery Jacobs MD, Ryan Romeo MD  and providing coverage with Melody Armstrong MD  Providing Infectious Disease Consultations at [facility], [facility], [facility]'s    Office# [phone] to schedule follow up appointments  Answering Service for urgent calls or New Consults [phone]  Cell# to text for urgent issues Juventino Whitten [phone]     infectious diseases progress note:    SARAH MORRISON is a 54y y. o. Male patient    Overnight and events of the last 24hrs reviewed    Allergies    fish (Hives)  ertapenem (Blisters; Rash)    Intolerances        ANTIBIOTICS/RELEVANT:  antimicrobials  piperacillin/tazobactam IVPB.. 3.375 Gram(s) IV Intermittent every 8 hours  vancomycin  IVPB 1250 milliGRAM(s) IV Intermittent every 12 hours    immunologic:    OTHER:  acetaminophen     Tablet .. 650 milliGRAM(s) Oral every 6 hours PRN  aMIOdarone    Tablet 200 milliGRAM(s) Oral daily  apixaban 5 milliGRAM(s) Oral every 12 hours  ascorbic acid 500 milliGRAM(s) Oral daily  aspirin  chewable 81 milliGRAM(s) Oral daily  atorvastatin 40 milliGRAM(s) Oral at bedtime  cetirizine 10 milliGRAM(s) Oral daily  dextrose 5%. 1000 milliLiter(s) IV Continuous <Continuous>  dextrose 5%. 1000 milliLiter(s) IV Continuous <Continuous>  dextrose 50% Injectable 25 Gram(s) IV Push once  dextrose 50% Injectable 12.5 Gram(s) IV Push once  dextrose 50% Injectable 25 Gram(s) IV Push once  dextrose Oral Gel 15 Gram(s) Oral once  digoxin     Tablet 125 MICROGram(s) Oral daily  famotidine    Tablet 20 milliGRAM(s) Oral daily  ferrous    sulfate 325 milliGRAM(s) Oral daily  gabapentin 100 milliGRAM(s) Oral every 12 hours  glucagon  Injectable 1 milliGRAM(s) IntraMuscular once  HYDROmorphone  Injectable 1 milliGRAM(s) IV Push every 4 hours PRN  insulin lispro (ADMELOG) corrective regimen sliding scale   SubCutaneous three times a day before meals  insulin lispro (ADMELOG) corrective regimen sliding scale   SubCutaneous at bedtime  melatonin 5 milliGRAM(s) Oral at bedtime  metoprolol succinate  milliGRAM(s) Oral every 12 hours  midodrine. 15 milliGRAM(s) Oral three times a day  naloxegol 25 milliGRAM(s) Oral daily  ondansetron Injectable 4 milliGRAM(s) IV Push every 8 hours PRN  oxybutynin XL 10 milliGRAM(s) Oral at bedtime  oxycodone    5 mG/acetaminophen 325 mG 2 Tablet(s) Oral every 4 hours PRN  pantoprazole    Tablet 40 milliGRAM(s) Oral before breakfast  polyethylene glycol 3350 17 Gram(s) Oral daily  senna 2 Tablet(s) Oral at bedtime  sodium chloride 0.9%. 1000 milliLiter(s) IV Continuous <Continuous>  sucralfate 1 Gram(s) Oral two times a day  tiotropium 2.5 MICROgram(s)/olodaterol 2.5 MICROgram(s) (STIOLTO) Inhaler 2 Puff(s) Inhalation daily  traMADol 50 milliGRAM(s) Oral every 4 hours PRN      Objective:  Vital Signs Last 24 Hrs  T(C): 36.8 (01 Nov 2024 05:13), Max: 36.8 (31 Oct 2024 12:08)  T(F): 98.2 (01 Nov 2024 05:13), Max: 98.2 (31 Oct 2024 12:08)  HR: 72 (01 Nov 2024 05:13) (67 - 79)  BP: 124/73 (01 Nov 2024 05:13) (115/67 - 125/79)  BP(mean): --  RR: 19 (01 Nov 2024 05:13) (17 - 19)  SpO2: 95% (01 Nov 2024 05:13) (94% - 95%)    Parameters below as of 01 Nov 2024 05:13  Patient On (Oxygen Delivery Method): room air        T(C): 36.8 (11-01-24 @ 05:13), Max: 39.4 (10-30-24 @ 12:26)  T(C): 36.8 (11-01-24 @ 05:13), Max: 39.4 (10-30-24 @ 12:26)  T(C): 36.8 (11-01-24 @ 05:13), Max: 39.4 (10-30-24 @ 12:26)    PHYSICAL EXAM:  HEENT: NC atraumatic  Neck: supple  Respiratory: no accessory muscle use, breathing comfortably  Cardiovascular: distant  Gastrointestinal: normal appearing, nondistended  Extremities: no clubbing, no cyanosis,        LABS:                          9.2    9.05  )-----------( 430      ( 31 Oct 2024 07:08 )             29.9       WBC  9.05 10-31 @ 07:08  12.12 10-30 @ 13:00      10-31    139  |  106  |  12  ----------------------------<  125[H]  3.8   |  28  |  0.64    Ca    8.7      31 Oct 2024 07:08    TPro  7.0  /  Alb  2.5[L]  /  TBili  0.4  /  DBili  x   /  AST  14[L]  /  ALT  15  /  AlkPhos  127[H]  10-30      Creatinine: 0.64 mg/dL (10-31-24 @ 07:08)  Creatinine: 0.85 mg/dL (10-30-24 @ 13:00)      PT/INR - ( 30 Oct 2024 13:00 )   PT: 18.5 sec;   INR: 1.57 ratio         PTT - ( 30 Oct 2024 13:00 )  PTT:39.1 sec  Urinalysis Basic - ( 31 Oct 2024 07:08 )    Color: x / Appearance: x / SG: x / pH: x  Gluc: 125 mg/dL / Ketone: x  / Bili: x / Urobili: x   Blood: x / Protein: x / Nitrite: x   Leuk Esterase: x / RBC: x / WBC x   Sq Epi: x / Non Sq Epi: x / Bacteria: x            INFLAMMATORY MARKERS      MICROBIOLOGY:    Culture - Tissue with Gram Stain (10.30.24 @ 11:09)    Gram Stain:   Rare polymorphonuclear leukocytes seen per low power field  Numerous Gram Negative Rods seen per oil power field  Few Gram positive cocci in pairs seen per oil power field   Specimen Source: Tissue        RADIOLOGY & ADDITIONAL STUDIES:

## 2024-11-01 NOTE — PROGRESS NOTE ADULT - ASSESSMENT
54m with dm, cad, pad, dvt atrial fib, hypertension, stroke, indwelling jacques, PE, indwelling jacques, prior diagnosis of Right Heel Osteomyelitis  Culture - Wound Aerobic/Anaerobic (09.05.24 @ 12:55):  Escherichia coli ESBL (-  Piperacillin/Tazobactam: R <=8), Citrobacter freundii, Providencia stuartii sent from wound care with fever, and infected wound to right foot, drained in the office and sent for culture, pt has hx of recurrent infections and severe reaction to ertapenem.    RECOMMENDATIONS  Right heel abscess with surrounding cellulitis  Culture - Wound Aerobic/Anaerobic (09.05.24 @ 12:55):  Escherichia coli ESBL (-  Piperacillin/Tazobactam: R <=8), Citrobacter freundii, Providencia stuartii  Culture - Tissue with Gram Stain (10.30.24 @ 11:09) Rare PMNs per low power field, Numerous GNRs seen per oil power field, Few GPCs in pairs   - coordinate with podiatry regarding any further imaging  challenging antimicrobial Rx with severe carbapenem intolerance so rec  Zosyn started 10/30-continue  Vancomycin started 10/30-continue with dosing per pharmacy protocol    Thank you for consulting us and involving us in the management of this most interesting and challenging case.  We will follow along in the care of this patient. Please call us at [phone] or text me directly on my cell# at [phone] with any concerns.

## 2024-11-01 NOTE — DIETITIAN INITIAL EVALUATION ADULT - PERTINENT MEDS FT
MEDICATIONS  (STANDING):  aMIOdarone    Tablet 200 milliGRAM(s) Oral daily  apixaban 5 milliGRAM(s) Oral every 12 hours  ascorbic acid 500 milliGRAM(s) Oral daily  aspirin  chewable 81 milliGRAM(s) Oral daily  atorvastatin 40 milliGRAM(s) Oral at bedtime  cetirizine 10 milliGRAM(s) Oral daily  dextrose 5%. 1000 milliLiter(s) (50 mL/Hr) IV Continuous <Continuous>  dextrose 5%. 1000 milliLiter(s) (100 mL/Hr) IV Continuous <Continuous>  dextrose 50% Injectable 25 Gram(s) IV Push once  dextrose 50% Injectable 12.5 Gram(s) IV Push once  dextrose 50% Injectable 25 Gram(s) IV Push once  dextrose Oral Gel 15 Gram(s) Oral once  digoxin     Tablet 125 MICROGram(s) Oral daily  famotidine    Tablet 20 milliGRAM(s) Oral daily  ferrous    sulfate 325 milliGRAM(s) Oral daily  gabapentin 100 milliGRAM(s) Oral every 12 hours  glucagon  Injectable 1 milliGRAM(s) IntraMuscular once  insulin lispro (ADMELOG) corrective regimen sliding scale   SubCutaneous at bedtime  insulin lispro (ADMELOG) corrective regimen sliding scale   SubCutaneous three times a day before meals  melatonin 5 milliGRAM(s) Oral at bedtime  metoprolol succinate  milliGRAM(s) Oral every 12 hours  midodrine. 15 milliGRAM(s) Oral three times a day  naloxegol 25 milliGRAM(s) Oral daily  oxybutynin XL 10 milliGRAM(s) Oral at bedtime  pantoprazole    Tablet 40 milliGRAM(s) Oral before breakfast  piperacillin/tazobactam IVPB.. 3.375 Gram(s) IV Intermittent every 8 hours  polyethylene glycol 3350 17 Gram(s) Oral daily  senna 2 Tablet(s) Oral at bedtime  sodium chloride 0.9%. 1000 milliLiter(s) (100 mL/Hr) IV Continuous <Continuous>  sucralfate 1 Gram(s) Oral two times a day  tiotropium 2.5 MICROgram(s)/olodaterol 2.5 MICROgram(s) (STIOLTO) Inhaler 2 Puff(s) Inhalation daily  vancomycin  IVPB 1250 milliGRAM(s) IV Intermittent every 12 hours    MEDICATIONS  (PRN):  ondansetron Injectable 4 milliGRAM(s) IV Push every 8 hours PRN Nausea and/or Vomiting  oxyCODONE    IR 5 milliGRAM(s) Oral every 4 hours PRN Severe Pain (7 - 10)  traMADol 25 milliGRAM(s) Oral every 4 hours PRN Mild Pain (1 - 3)  traMADol 50 milliGRAM(s) Oral every 4 hours PRN Moderate Pain (4 - 6)

## 2024-11-01 NOTE — PROGRESS NOTE ADULT - SUBJECTIVE AND OBJECTIVE BOX
54y year old Male seen at [facility] [id number] plantar right heel ulcer and midfoot abscess, s/p incision and drainage at the wound care center.   Denies any fever, chills, nausea, vomiting, chest pain, shortness of breath, or calf pain at this time.      Denies any fever, chills, nausea, vomiting, chest pain, shortness of breath, or calf pain at this time.    Allergies    fish (Hives)  ertapenem (Blisters; Rash)    Intolerances        MEDICATIONS  (STANDING):  aMIOdarone    Tablet 200 milliGRAM(s) Oral daily  apixaban 5 milliGRAM(s) Oral every 12 hours  ascorbic acid 500 milliGRAM(s) Oral daily  aspirin  chewable 81 milliGRAM(s) Oral daily  atorvastatin 40 milliGRAM(s) Oral at bedtime  cetirizine 10 milliGRAM(s) Oral daily  dextrose 5%. 1000 milliLiter(s) (100 mL/Hr) IV Continuous <Continuous>  dextrose 5%. 1000 milliLiter(s) (50 mL/Hr) IV Continuous <Continuous>  dextrose 50% Injectable 25 Gram(s) IV Push once  dextrose 50% Injectable 12.5 Gram(s) IV Push once  dextrose 50% Injectable 25 Gram(s) IV Push once  dextrose Oral Gel 15 Gram(s) Oral once  digoxin     Tablet 125 MICROGram(s) Oral daily  famotidine    Tablet 20 milliGRAM(s) Oral daily  ferrous    sulfate 325 milliGRAM(s) Oral daily  gabapentin 100 milliGRAM(s) Oral every 12 hours  glucagon  Injectable 1 milliGRAM(s) IntraMuscular once  insulin lispro (ADMELOG) corrective regimen sliding scale   SubCutaneous at bedtime  insulin lispro (ADMELOG) corrective regimen sliding scale   SubCutaneous three times a day before meals  melatonin 5 milliGRAM(s) Oral at bedtime  metoprolol succinate  milliGRAM(s) Oral every 12 hours  midodrine. 15 milliGRAM(s) Oral three times a day  naloxegol 25 milliGRAM(s) Oral daily  oxybutynin XL 10 milliGRAM(s) Oral at bedtime  pantoprazole    Tablet 40 milliGRAM(s) Oral before breakfast  piperacillin/tazobactam IVPB.. 3.375 Gram(s) IV Intermittent every 8 hours  polyethylene glycol 3350 17 Gram(s) Oral daily  senna 2 Tablet(s) Oral at bedtime  sodium chloride 0.9%. 1000 milliLiter(s) (100 mL/Hr) IV Continuous <Continuous>  sucralfate 1 Gram(s) Oral two times a day  tiotropium 2.5 MICROgram(s)/olodaterol 2.5 MICROgram(s) (STIOLTO) Inhaler 2 Puff(s) Inhalation daily    MEDICATIONS  (PRN):  ondansetron Injectable 4 milliGRAM(s) IV Push every 8 hours PRN Nausea and/or Vomiting  oxyCODONE    IR 10 milliGRAM(s) Oral four times a day PRN Severe Pain (7 - 10)  traMADol 50 milliGRAM(s) Oral every 4 hours PRN Moderate Pain (4 - 6)  traMADol 25 milliGRAM(s) Oral every 4 hours PRN Mild Pain (1 - 3)      Vital Signs Last 24 Hrs  T(C): 37 (02 Nov 2024 04:40), Max: 37 (01 Nov 2024 13:01)  T(F): 98.6 (02 Nov 2024 04:40), Max: 98.6 (01 Nov 2024 13:01)  HR: 67 (02 Nov 2024 04:40) (63 - 67)  BP: 106/62 (02 Nov 2024 04:40) (106/62 - 133/79)  BP(mean): --  RR: 18 (02 Nov 2024 04:40) (17 - 18)  SpO2: 95% (02 Nov 2024 04:40) (92% - 96%)    Parameters below as of 02 Nov 2024 04:40  Patient On (Oxygen Delivery Method): room air        PHYSICAL EXAM:  Vascular: DP & PT non palpable bilaterally, Capillary refill 3 seconds, Digital hair absent bilaterally  Neurological: Light touch sensation diminished bilaterally  Musculoskeletal: 3/5 strength in all quadrants to the right and 4/5 to the left , s/p partial calcanectomy of the right heel   Dermatological:  Right plantar midfoot 1.5cm x 0.7cm x 1.0cm midfoot s/p Incision and drainage of the abscess at the wound care center, no more edema and erythema noted, no more purulence noted   14.0 cm x 7.6cm x 0.3 cm  heel wound noted with  granular wound bed, no probe to bone, no periwound erythema, no fluctuance, no malodor, no proximal streaking at this time    CBC Full  -  ( 01 Nov 2024 11:19 )  WBC Count : 9.79 K/uL  RBC Count : 3.25 M/uL  Hemoglobin : 9.7 g/dL  Hematocrit : 30.3 %  Platelet Count - Automated : 463 K/uL  Mean Cell Volume : 93.2 fl  Mean Cell Hemoglobin : 29.8 pg  Mean Cell Hemoglobin Concentration : 32.0 g/dL  Auto Neutrophil # : x  Auto Lymphocyte # : x  Auto Monocyte # : x  Auto Eosinophil # : x  Auto Basophil # : x  Auto Neutrophil % : x  Auto Lymphocyte % : x  Auto Monocyte % : x  Auto Eosinophil % : x  Auto Basophil % : x      ----------CHEM PANEL----------          Culture - Urine (collected 31 Oct 2024 02:15)  Source: Catheterized Catheterized  Final Report (01 Nov 2024 07:32):    No growth    Culture - Blood (collected 30 Oct 2024 13:00)  Source: .Blood BLOOD  Preliminary Report (01 Nov 2024 19:01):    No growth at 48 Hours    Culture - Blood (collected 30 Oct 2024 12:55)  Source: .Blood BLOOD  Preliminary Report (01 Nov 2024 19:01):    No growth at 48 Hours        Imaging: ----------

## 2024-11-01 NOTE — DIETITIAN INITIAL EVALUATION ADULT - NS FNS DIET ORDER
Diet, Consistent Carbohydrate w/Evening Snack:   Supplement Feeding Modality:  Oral  Ensure Max Cans or Servings Per Day:  1       Frequency:  Daily (10-30-24 @ 14:42) [Active]

## 2024-11-01 NOTE — PHARMACOTHERAPY INTERVENTION NOTE - COMMENTS
55 y/o male being treated for right foot abscess  with Vancomycin 1250 mg q 12 and zosyn. 3.375 g q 8. Accepted pharmacy to dose per Dr Whitten.   1. Indication for the vancomycin: right heel abscess wit surrounding cellulitis  2. Requesting Provider: Dr. Whitten  3. Current plan and dosing regimen: 1250 mg q 12  4. Day of therapy: 3   5. Cultures: Tissue 10/30--> gram (+) cocci in pairs   6. Target trough or AUC/BAYLEE: 400-600 hr*ug/mL  7. Day and time level is due:  8. Previous levels: n/a  9. Expected 24-hour AUC: 431 hr*ug/mL   53 y/o male being treated for right foot abscess  with Vancomycin 1250 mg q 12 and zosyn. 3.375 g q 8. Trough 11/1 @16:05= 23.4 ug/dL. Reccomend holding dose with additional trough for 11/2 with AM labs for continuous monitoring. Accepted pharmacy to dose per Dr Whitten.   1. Indication for the vancomycin: right heel abscess wit surrounding cellulitis  2. Requesting Provider: Dr. Whitten  3. Current plan and dosing regimen: 1250 mg q 12  4. Day of therapy: 3   5. Cultures: Tissue 10/30--> gram (+) cocci in pairs   6. Target trough or AUC/BAYLEE: 400-600 hr*ug/mL  7. Day and time level is due: 11/2 w/ AM labs  8. Previous levels:  11/1 @16:05 =23.4ug/dL  9. Expected 24-hour AUC: to be re-evaluated

## 2024-11-01 NOTE — DIETITIAN INITIAL EVALUATION ADULT - PERTINENT LABORATORY DATA
11-01    140  |  105  |  10  ----------------------------<  225[H]  4.0   |  28  |  0.84    Ca    8.6      01 Nov 2024 11:19    POCT Blood Glucose.: 224 mg/dL (11-01-24 @ 11:29)  A1C with Estimated Average Glucose Result: 6.8 % (09-05-24 @ 10:35)  A1C with Estimated Average Glucose Result: 7.4 % (05-28-24 @ 11:45)  A1C with Estimated Average Glucose Result: 7.0 % (03-17-24 @ 06:41)

## 2024-11-01 NOTE — PROGRESS NOTE ADULT - SUBJECTIVE AND OBJECTIVE BOX
Date of Service 11-01-24 @ 15:28    Patient is a 54y old  Male who presents with a chief complaint of Type 2 diabetes mellitus with foot ulcer     (01 Nov 2024 15:16)      INTERVAL /OVERNIGHT EVENTS: waiting for final cx    MEDICATIONS  (STANDING):  aMIOdarone    Tablet 200 milliGRAM(s) Oral daily  apixaban 5 milliGRAM(s) Oral every 12 hours  ascorbic acid 500 milliGRAM(s) Oral daily  aspirin  chewable 81 milliGRAM(s) Oral daily  atorvastatin 40 milliGRAM(s) Oral at bedtime  cetirizine 10 milliGRAM(s) Oral daily  dextrose 5%. 1000 milliLiter(s) (100 mL/Hr) IV Continuous <Continuous>  dextrose 5%. 1000 milliLiter(s) (50 mL/Hr) IV Continuous <Continuous>  dextrose 50% Injectable 25 Gram(s) IV Push once  dextrose 50% Injectable 12.5 Gram(s) IV Push once  dextrose 50% Injectable 25 Gram(s) IV Push once  dextrose Oral Gel 15 Gram(s) Oral once  digoxin     Tablet 125 MICROGram(s) Oral daily  famotidine    Tablet 20 milliGRAM(s) Oral daily  ferrous    sulfate 325 milliGRAM(s) Oral daily  gabapentin 100 milliGRAM(s) Oral every 12 hours  glucagon  Injectable 1 milliGRAM(s) IntraMuscular once  insulin lispro (ADMELOG) corrective regimen sliding scale   SubCutaneous three times a day before meals  insulin lispro (ADMELOG) corrective regimen sliding scale   SubCutaneous at bedtime  melatonin 5 milliGRAM(s) Oral at bedtime  metoprolol succinate  milliGRAM(s) Oral every 12 hours  midodrine. 15 milliGRAM(s) Oral three times a day  naloxegol 25 milliGRAM(s) Oral daily  oxybutynin XL 10 milliGRAM(s) Oral at bedtime  pantoprazole    Tablet 40 milliGRAM(s) Oral before breakfast  piperacillin/tazobactam IVPB.. 3.375 Gram(s) IV Intermittent every 8 hours  polyethylene glycol 3350 17 Gram(s) Oral daily  senna 2 Tablet(s) Oral at bedtime  sodium chloride 0.9%. 1000 milliLiter(s) (100 mL/Hr) IV Continuous <Continuous>  sucralfate 1 Gram(s) Oral two times a day  tiotropium 2.5 MICROgram(s)/olodaterol 2.5 MICROgram(s) (STIOLTO) Inhaler 2 Puff(s) Inhalation daily  vancomycin  IVPB 1250 milliGRAM(s) IV Intermittent every 12 hours    MEDICATIONS  (PRN):  ondansetron Injectable 4 milliGRAM(s) IV Push every 8 hours PRN Nausea and/or Vomiting  oxyCODONE    IR 5 milliGRAM(s) Oral every 4 hours PRN Severe Pain (7 - 10)  traMADol 25 milliGRAM(s) Oral every 4 hours PRN Mild Pain (1 - 3)  traMADol 50 milliGRAM(s) Oral every 4 hours PRN Moderate Pain (4 - 6)      Allergies    fish (Hives)  ertapenem (Blisters; Rash)    Intolerances        REVIEW OF SYSTEMS:  CONSTITUTIONAL: No fever, weight loss, or fatigue  EYES: No eye pain, visual disturbances, or discharge  ENMT:  No difficulty hearing, tinnitus, vertigo; No sinus or throat pain  NECK: No pain or stiffness  RESPIRATORY: No cough, wheezing, chills or hemoptysis; No shortness of breath  CARDIOVASCULAR: No chest pain, palpitations, dizziness, or leg swelling  GASTROINTESTINAL: No abdominal or epigastric pain. No nausea, vomiting, or hematemesis; No diarrhea or constipation. No melena or hematochezia.  GENITOURINARY: No dysuria, frequency, hematuria, or incontinence  NEUROLOGICAL: No headaches, memory loss, loss of strength, numbness, or tremors  SKIN: No itching, burning, rashes, or lesions   LYMPH NODES: No enlarged glands  ENDOCRINE: No heat or cold intolerance; No hair loss; No polydipsia or polyuria  MUSCULOSKELETAL: No joint pain or swelling; No muscle, back, or extremity pain  PSYCHIATRIC: No depression, anxiety, mood swings, or difficulty sleeping  HEME/LYMPH: No easy bruising, or bleeding gums  ALLERGY AND IMMUNOLOGIC: No hives or eczema    Vital Signs Last 24 Hrs  T(C): 37 (01 Nov 2024 13:01), Max: 37 (01 Nov 2024 13:01)  T(F): 98.6 (01 Nov 2024 13:01), Max: 98.6 (01 Nov 2024 13:01)  HR: 63 (01 Nov 2024 13:01) (63 - 79)  BP: 131/82 (01 Nov 2024 13:01) (115/67 - 131/82)  BP(mean): --  RR: 18 (01 Nov 2024 13:01) (17 - 19)  SpO2: 93% (01 Nov 2024 13:01) (93% - 95%)    Parameters below as of 01 Nov 2024 13:01  Patient On (Oxygen Delivery Method): room air        PHYSICAL EXAM:  GENERAL: NAD, well-groomed, well-developed  HEAD:  Atraumatic, Normocephalic  EYES: EOMI, PERRLA, conjunctiva and sclera clear  ENMT: No tonsillar erythema, exudates, or enlargement; Moist mucous membranes, Good dentition, No lesions  NECK: Supple, No JVD, Normal thyroid  NERVOUS SYSTEM:  Alert & Oriented X3, Good concentration; Motor Strength 5/5 B/L upper and lower extremities; DTRs 2+ intact and symmetric  CHEST/LUNG: Clear to auscultation bilaterally; No rales, rhonchi, wheezing, or rubs  HEART: Regular rate and rhythm; No murmurs, rubs, or gallops  ABDOMEN: Soft, Nontender, Nondistended; Bowel sounds present  EXTREMITIES:  2+ Peripheral Pulses, No clubbing, cyanosis, or edema  LYMPH: No lymphadenopathy noted  SKIN: heel wound    LABS:                        9.7    9.79  )-----------( 463      ( 01 Nov 2024 11:19 )             30.3     01 Nov 2024 11:19    140    |  105    |  10     ----------------------------<  225    4.0     |  28     |  0.84     Ca    8.6        01 Nov 2024 11:19        Urinalysis Basic - ( 01 Nov 2024 11:19 )    Color: x / Appearance: x / SG: x / pH: x  Gluc: 225 mg/dL / Ketone: x  / Bili: x / Urobili: x   Blood: x / Protein: x / Nitrite: x   Leuk Esterase: x / RBC: x / WBC x   Sq Epi: x / Non Sq Epi: x / Bacteria: x      CAPILLARY BLOOD GLUCOSE      POCT Blood Glucose.: 224 mg/dL (01 Nov 2024 11:29)  POCT Blood Glucose.: 219 mg/dL (01 Nov 2024 07:44)  POCT Blood Glucose.: 179 mg/dL (31 Oct 2024 21:38)  POCT Blood Glucose.: 210 mg/dL (31 Oct 2024 16:31)      RADIOLOGY & ADDITIONAL TESTS:    Notes Reviewed:  [x ] YES  [ ] NO    Care Discussed with Consultants/Other Providers [x ] YES  [ ] NO

## 2024-11-01 NOTE — DIETITIAN INITIAL EVALUATION ADULT - OTHER INFO
53 yo male with PMH of Afib, BPH, CAD S/P percutaneous coronary angioplasty, Cerebrovascular accident, DM, NSTEMI, Htn, Osteomyelitis s/p debridement, Perforated gastric ulcer s/p emergent ex-lap omentopexy and plication 6/2019, Pulmonary embolism."  PAST SURGICAL HISTORY:  H/O abdominal surgery   Perforated gastric ulcer   Traumatic amputation of left foot, initial encounter.   admitted for R calcaneous abcess.

## 2024-11-01 NOTE — DIETITIAN INITIAL EVALUATION ADULT - NSFNSPHYEXAMSKINFT_GEN_A_CORE
Pressure Injury 1: sacrum, Suspected deep tissue injury  Pressure Injury 2: Left:, ischium, Unstageable  Pressure Injury 3: Right:, upper, thigh, Stage II

## 2024-11-01 NOTE — PROGRESS NOTE ADULT - ASSESSMENT
Called again unable to leave a message.   54 yr old male w multiple medical problems presenting with fever secondary to R calcaneus abscess

## 2024-11-02 DIAGNOSIS — Z87.440 PERSONAL HISTORY OF URINARY (TRACT) INFECTIONS: ICD-10-CM

## 2024-11-02 DIAGNOSIS — L02.611 CUTANEOUS ABSCESS OF RIGHT FOOT: ICD-10-CM

## 2024-11-02 DIAGNOSIS — Z87.891 PERSONAL HISTORY OF NICOTINE DEPENDENCE: ICD-10-CM

## 2024-11-02 DIAGNOSIS — E56.9 VITAMIN DEFICIENCY, UNSPECIFIED: ICD-10-CM

## 2024-11-02 DIAGNOSIS — L97.412 NON-PRESSURE CHRONIC ULCER OF RIGHT HEEL AND MIDFOOT WITH FAT LAYER EXPOSED: ICD-10-CM

## 2024-11-02 DIAGNOSIS — Z79.84 LONG TERM (CURRENT) USE OF ORAL HYPOGLYCEMIC DRUGS: ICD-10-CM

## 2024-11-02 DIAGNOSIS — Z79.899 OTHER LONG TERM (CURRENT) DRUG THERAPY: ICD-10-CM

## 2024-11-02 DIAGNOSIS — Z79.01 LONG TERM (CURRENT) USE OF ANTICOAGULANTS: ICD-10-CM

## 2024-11-02 DIAGNOSIS — K59.00 CONSTIPATION, UNSPECIFIED: ICD-10-CM

## 2024-11-02 DIAGNOSIS — D64.9 ANEMIA, UNSPECIFIED: ICD-10-CM

## 2024-11-02 DIAGNOSIS — E11.40 TYPE 2 DIABETES MELLITUS WITH DIABETIC NEUROPATHY, UNSPECIFIED: ICD-10-CM

## 2024-11-02 DIAGNOSIS — E11.621 TYPE 2 DIABETES MELLITUS WITH FOOT ULCER: ICD-10-CM

## 2024-11-02 DIAGNOSIS — Z91.013 ALLERGY TO SEAFOOD: ICD-10-CM

## 2024-11-02 DIAGNOSIS — E11.59 TYPE 2 DIABETES MELLITUS WITH OTHER CIRCULATORY COMPLICATIONS: ICD-10-CM

## 2024-11-02 DIAGNOSIS — Z86.73 PERSONAL HISTORY OF TRANSIENT ISCHEMIC ATTACK (TIA), AND CEREBRAL INFARCTION WITHOUT RESIDUAL DEFICITS: ICD-10-CM

## 2024-11-02 DIAGNOSIS — Z89.432 ACQUIRED ABSENCE OF LEFT FOOT: ICD-10-CM

## 2024-11-02 DIAGNOSIS — E87.6 HYPOKALEMIA: ICD-10-CM

## 2024-11-02 DIAGNOSIS — I25.2 OLD MYOCARDIAL INFARCTION: ICD-10-CM

## 2024-11-02 LAB
-  AMOXICILLIN/CLAVULANIC ACID: SIGNIFICANT CHANGE UP
-  AMPICILLIN/SULBACTAM: SIGNIFICANT CHANGE UP
-  AMPICILLIN/SULBACTAM: SIGNIFICANT CHANGE UP
-  AMPICILLIN: SIGNIFICANT CHANGE UP
-  AZTREONAM: SIGNIFICANT CHANGE UP
-  AZTREONAM: SIGNIFICANT CHANGE UP
-  CEFAZOLIN: SIGNIFICANT CHANGE UP
-  CEFAZOLIN: SIGNIFICANT CHANGE UP
-  CEFEPIME: SIGNIFICANT CHANGE UP
-  CEFEPIME: SIGNIFICANT CHANGE UP
-  CEFOTAXIME: SIGNIFICANT CHANGE UP
-  CEFOXITIN: SIGNIFICANT CHANGE UP
-  CEFTAZIDIME: SIGNIFICANT CHANGE UP
-  CEFTRIAXONE: SIGNIFICANT CHANGE UP
-  CEFTRIAXONE: SIGNIFICANT CHANGE UP
-  CEFUROXIME: SIGNIFICANT CHANGE UP
-  CIPROFLOXACIN: SIGNIFICANT CHANGE UP
-  CIPROFLOXACIN: SIGNIFICANT CHANGE UP
-  ERTAPENEM: SIGNIFICANT CHANGE UP
-  ERTAPENEM: SIGNIFICANT CHANGE UP
-  GENTAMICIN: SIGNIFICANT CHANGE UP
-  GENTAMICIN: SIGNIFICANT CHANGE UP
-  IMIPENEM: SIGNIFICANT CHANGE UP
-  IMIPENEM: SIGNIFICANT CHANGE UP
-  LEVOFLOXACIN: SIGNIFICANT CHANGE UP
-  LEVOFLOXACIN: SIGNIFICANT CHANGE UP
-  MEROPENEM: SIGNIFICANT CHANGE UP
-  MEROPENEM: SIGNIFICANT CHANGE UP
-  MINOCYCLINE: SIGNIFICANT CHANGE UP
-  PIPERACILLIN/TAZOBACTAM: SIGNIFICANT CHANGE UP
-  PIPERACILLIN/TAZOBACTAM: SIGNIFICANT CHANGE UP
-  TIGECYCLINE: SIGNIFICANT CHANGE UP
-  TOBRAMYCIN: SIGNIFICANT CHANGE UP
-  TOBRAMYCIN: SIGNIFICANT CHANGE UP
-  TRIMETHOPRIM/SULFAMETHOXAZOLE: SIGNIFICANT CHANGE UP
-  TRIMETHOPRIM/SULFAMETHOXAZOLE: SIGNIFICANT CHANGE UP
-  VANCOMYCIN: SIGNIFICANT CHANGE UP
GLUCOSE BLDC GLUCOMTR-MCNC: 163 MG/DL — HIGH (ref 70–99)
GLUCOSE BLDC GLUCOMTR-MCNC: 193 MG/DL — HIGH (ref 70–99)
GLUCOSE BLDC GLUCOMTR-MCNC: 197 MG/DL — HIGH (ref 70–99)
GLUCOSE BLDC GLUCOMTR-MCNC: 224 MG/DL — HIGH (ref 70–99)
METHOD TYPE: SIGNIFICANT CHANGE UP
VANCOMYCIN TROUGH SERPL-MCNC: 15.7 UG/ML — SIGNIFICANT CHANGE UP (ref 10–20)

## 2024-11-02 RX ORDER — VANCOMYCIN HYDROCHLORIDE 50 MG/ML
750 KIT ORAL EVERY 12 HOURS
Refills: 0 | Status: DISCONTINUED | OUTPATIENT
Start: 2024-11-02 | End: 2024-11-02

## 2024-11-02 RX ORDER — GABAPENTIN 300 MG/1
100 CAPSULE ORAL THREE TIMES A DAY
Refills: 0 | Status: DISCONTINUED | OUTPATIENT
Start: 2024-11-02 | End: 2024-11-04

## 2024-11-02 RX ORDER — OXYCODONE HYDROCHLORIDE 30 MG/1
5 TABLET ORAL EVERY 4 HOURS
Refills: 0 | Status: DISCONTINUED | OUTPATIENT
Start: 2024-11-02 | End: 2024-11-05

## 2024-11-02 RX ORDER — TRAMADOL HYDROCHLORIDE 50 MG/1
50 TABLET, COATED ORAL EVERY 4 HOURS
Refills: 0 | Status: DISCONTINUED | OUTPATIENT
Start: 2024-11-02 | End: 2024-11-05

## 2024-11-02 RX ORDER — HYDROMORPHONE HCL/0.9% NACL/PF 6 MG/30 ML
2 PATIENT CONTROLLED ANALGESIA SYRINGE INTRAVENOUS EVERY 4 HOURS
Refills: 0 | Status: DISCONTINUED | OUTPATIENT
Start: 2024-11-02 | End: 2024-11-05

## 2024-11-02 RX ADMIN — Medication 5 MILLIGRAM(S): at 21:09

## 2024-11-02 RX ADMIN — GABAPENTIN 100 MILLIGRAM(S): 300 CAPSULE ORAL at 13:29

## 2024-11-02 RX ADMIN — OXYCODONE HYDROCHLORIDE 10 MILLIGRAM(S): 30 TABLET ORAL at 00:02

## 2024-11-02 RX ADMIN — OXYCODONE HYDROCHLORIDE 10 MILLIGRAM(S): 30 TABLET ORAL at 05:36

## 2024-11-02 RX ADMIN — PANTOPRAZOLE SODIUM 40 MILLIGRAM(S): 40 TABLET, DELAYED RELEASE ORAL at 05:08

## 2024-11-02 RX ADMIN — SUCRALFATE 1 GRAM(S): 1 SUSPENSION ORAL at 05:07

## 2024-11-02 RX ADMIN — Medication 2 MILLIGRAM(S): at 19:12

## 2024-11-02 RX ADMIN — OXYCODONE HYDROCHLORIDE 10 MILLIGRAM(S): 30 TABLET ORAL at 05:06

## 2024-11-02 RX ADMIN — Medication 2 MILLIGRAM(S): at 13:56

## 2024-11-02 RX ADMIN — Medication 2 MILLIGRAM(S): at 18:12

## 2024-11-02 RX ADMIN — APIXABAN 5 MILLIGRAM(S): 5 TABLET, FILM COATED ORAL at 17:04

## 2024-11-02 RX ADMIN — OXYBUTYNIN CHLORIDE 10 MILLIGRAM(S): 5 TABLET ORAL at 21:09

## 2024-11-02 RX ADMIN — OXYCODONE HYDROCHLORIDE 10 MILLIGRAM(S): 30 TABLET ORAL at 12:32

## 2024-11-02 RX ADMIN — MIDODRINE HYDROCHLORIDE 15 MILLIGRAM(S): 2.5 TABLET ORAL at 11:29

## 2024-11-02 RX ADMIN — TIOTROPIUM BROMIDE AND OLODATEROL 2 PUFF(S): 3.124; 2.736 SPRAY, METERED RESPIRATORY (INHALATION) at 07:29

## 2024-11-02 RX ADMIN — Medication 40 MILLIGRAM(S): at 21:08

## 2024-11-02 RX ADMIN — CETIRIZINE HCL 10 MILLIGRAM(S): 10 TABLET ORAL at 11:30

## 2024-11-02 RX ADMIN — Medication 2 MILLIGRAM(S): at 13:26

## 2024-11-02 RX ADMIN — OXYCODONE HYDROCHLORIDE 10 MILLIGRAM(S): 30 TABLET ORAL at 11:32

## 2024-11-02 RX ADMIN — GABAPENTIN 100 MILLIGRAM(S): 300 CAPSULE ORAL at 21:08

## 2024-11-02 RX ADMIN — Medication 81 MILLIGRAM(S): at 11:30

## 2024-11-02 RX ADMIN — Medication 500 MILLIGRAM(S): at 11:29

## 2024-11-02 RX ADMIN — Medication 100 MILLIGRAM(S): at 17:04

## 2024-11-02 RX ADMIN — Medication 1: at 07:36

## 2024-11-02 RX ADMIN — Medication 1: at 11:37

## 2024-11-02 RX ADMIN — GABAPENTIN 100 MILLIGRAM(S): 300 CAPSULE ORAL at 05:08

## 2024-11-02 RX ADMIN — MIDODRINE HYDROCHLORIDE 15 MILLIGRAM(S): 2.5 TABLET ORAL at 05:07

## 2024-11-02 RX ADMIN — POLYETHYLENE GLYCOL 3350 17 GRAM(S): 17 POWDER, FOR SOLUTION ORAL at 11:29

## 2024-11-02 RX ADMIN — PIPERACILLIN AND TAZOBACTAM 25 GRAM(S): .5; 4 INJECTION, POWDER, LYOPHILIZED, FOR SOLUTION INTRAVENOUS at 21:08

## 2024-11-02 RX ADMIN — APIXABAN 5 MILLIGRAM(S): 5 TABLET, FILM COATED ORAL at 05:08

## 2024-11-02 RX ADMIN — Medication 100 MILLIGRAM(S): at 05:08

## 2024-11-02 RX ADMIN — Medication 125 MICROGRAM(S): at 05:07

## 2024-11-02 RX ADMIN — FAMOTIDINE 20 MILLIGRAM(S): 10 INJECTION INTRAVENOUS at 11:29

## 2024-11-02 RX ADMIN — Medication 1: at 16:59

## 2024-11-02 RX ADMIN — Medication 2 TABLET(S): at 21:09

## 2024-11-02 RX ADMIN — MIDODRINE HYDROCHLORIDE 15 MILLIGRAM(S): 2.5 TABLET ORAL at 17:03

## 2024-11-02 RX ADMIN — Medication 325 MILLIGRAM(S): at 11:30

## 2024-11-02 RX ADMIN — NALOXEGOL OXALATE 25 MILLIGRAM(S): 12.5 TABLET, FILM COATED ORAL at 11:29

## 2024-11-02 RX ADMIN — PIPERACILLIN AND TAZOBACTAM 25 GRAM(S): .5; 4 INJECTION, POWDER, LYOPHILIZED, FOR SOLUTION INTRAVENOUS at 13:21

## 2024-11-02 RX ADMIN — Medication 200 MILLIGRAM(S): at 05:08

## 2024-11-02 RX ADMIN — PIPERACILLIN AND TAZOBACTAM 25 GRAM(S): .5; 4 INJECTION, POWDER, LYOPHILIZED, FOR SOLUTION INTRAVENOUS at 05:08

## 2024-11-02 RX ADMIN — SUCRALFATE 1 GRAM(S): 1 SUSPENSION ORAL at 17:04

## 2024-11-02 NOTE — ASSESSMENT
[Verbal] : Verbal [Written] : Written [Demo] : Demo [Patient] : Patient [Caregiver] : Caregiver [Home Health Provider] : Home Health Provider [Good - alert, interested, motivated] : Good - alert, interested, motivated [Verbalizes knowledge/Understanding] : Verbalizes knowledge/understanding [Dressing changes] : dressing changes [Foot Care] : foot care [Skin Care] : skin care [Signs and symptoms of infection] : sign and symptoms of infection [Nutrition] : nutrition [How and When to Call] : how and when to call [Pain Management] : pain management [Off-loading] : off-loading [Patient responsibility to plan of care] : patient responsibility to plan of care [Stable] : stable [Wheelchair] : Wheelchair [Not Applicable - Long Term Care/Home Health Agency] : Long Term Care/Home Health Agency: Not Applicable [Labs and Tests] : labs and tests [Hospitalization] : hospitalization [Emergency Room] : Emergency Room [] : No [FreeTextEntry2] : Infection prevention Localized wound care  Goal remaining pain free regarding wounds Offloading   Promote optimal skin care and nutrition. Hospitalization   [FreeTextEntry3] : abscess of the R heel  [FreeTextEntry4] : Apligraf held due to abscess of the R heel, same day I and D performed. Pt to follow up after Hospital discharge

## 2024-11-02 NOTE — REVIEW OF SYSTEMS
[Skin Wound] : skin wound [Limb Weakness] : limb weakness [Easy Bleeding] : a tendency for easy bleeding [Fever] : no fever [Chills] : no chills [Eye Pain] : no eye pain [Earache] : no earache [Loss Of Hearing] : no hearing loss [Chest Pain] : no chest pain [Shortness Of Breath] : no shortness of breath [Abdominal Pain] : no abdominal pain [Anxiety] : no anxiety [FreeTextEntry5] : htn, hld diabetic cardio vascular disease  [FreeTextEntry9] : s/p left foot TMA w/Achilles tenotomy [de-identified] : left foot DFU3, s/p TMA-  healed, right heel wound down to bone , s/p partial calcanectomy with (+) OM on path [de-identified] : diabetic neuropathy [de-identified] : IDDM with neuropathy  [FreeTextEntry1] : 1164

## 2024-11-02 NOTE — PROGRESS NOTE ADULT - SUBJECTIVE AND OBJECTIVE BOX
[facility] DIV[facility] of INFECTIOUS DISEASE  Juventino Whitten MD PhD, Symone Hickey MD, Anne-Marie Li MD, Jeffery Jacobs MD, Ryan Romeo MD  and providing coverage with Melody Armstrong MD  Providing Infectious Disease Consultations at [facility], [facility], [facility]'s    Office# [phone] to schedule follow up appointments  Answering Service for urgent calls or New Consults [phone]  Cell# to text for urgent issues Juventino Whitten [phone]     infectious diseases progress note:    SARAH MORRISON is a 54y y. o. Male patient    Overnight and events of the last 24hrs reviewed    Allergies    fish (Hives)  ertapenem (Blisters; Rash)    Intolerances        ANTIBIOTICS/RELEVANT:  antimicrobials  piperacillin/tazobactam IVPB.. 3.375 Gram(s) IV Intermittent every 8 hours  vancomycin  IVPB 750 milliGRAM(s) IV Intermittent every 12 hours    immunologic:    OTHER:  aMIOdarone    Tablet 200 milliGRAM(s) Oral daily  apixaban 5 milliGRAM(s) Oral every 12 hours  ascorbic acid 500 milliGRAM(s) Oral daily  aspirin  chewable 81 milliGRAM(s) Oral daily  atorvastatin 40 milliGRAM(s) Oral at bedtime  cetirizine 10 milliGRAM(s) Oral daily  dextrose 5%. 1000 milliLiter(s) IV Continuous <Continuous>  dextrose 5%. 1000 milliLiter(s) IV Continuous <Continuous>  dextrose 50% Injectable 25 Gram(s) IV Push once  dextrose 50% Injectable 12.5 Gram(s) IV Push once  dextrose 50% Injectable 25 Gram(s) IV Push once  dextrose Oral Gel 15 Gram(s) Oral once  digoxin     Tablet 125 MICROGram(s) Oral daily  famotidine    Tablet 20 milliGRAM(s) Oral daily  ferrous    sulfate 325 milliGRAM(s) Oral daily  gabapentin 100 milliGRAM(s) Oral every 12 hours  glucagon  Injectable 1 milliGRAM(s) IntraMuscular once  insulin lispro (ADMELOG) corrective regimen sliding scale   SubCutaneous at bedtime  insulin lispro (ADMELOG) corrective regimen sliding scale   SubCutaneous three times a day before meals  melatonin 5 milliGRAM(s) Oral at bedtime  metoprolol succinate  milliGRAM(s) Oral every 12 hours  midodrine. 15 milliGRAM(s) Oral three times a day  naloxegol 25 milliGRAM(s) Oral daily  ondansetron Injectable 4 milliGRAM(s) IV Push every 8 hours PRN  oxybutynin XL 10 milliGRAM(s) Oral at bedtime  oxyCODONE    IR 10 milliGRAM(s) Oral four times a day PRN  pantoprazole    Tablet 40 milliGRAM(s) Oral before breakfast  polyethylene glycol 3350 17 Gram(s) Oral daily  senna 2 Tablet(s) Oral at bedtime  sodium chloride 0.9%. 1000 milliLiter(s) IV Continuous <Continuous>  sucralfate 1 Gram(s) Oral two times a day  tiotropium 2.5 MICROgram(s)/olodaterol 2.5 MICROgram(s) (STIOLTO) Inhaler 2 Puff(s) Inhalation daily  traMADol 50 milliGRAM(s) Oral every 4 hours PRN  traMADol 25 milliGRAM(s) Oral every 4 hours PRN      Objective:  Vital Signs Last 24 Hrs  T(C): 37 (02 Nov 2024 04:40), Max: 37 (01 Nov 2024 13:01)  T(F): 98.6 (02 Nov 2024 04:40), Max: 98.6 (01 Nov 2024 13:01)  HR: 67 (02 Nov 2024 04:40) (63 - 67)  BP: 106/62 (02 Nov 2024 04:40) (106/62 - 133/79)  BP(mean): --  RR: 18 (02 Nov 2024 04:40) (17 - 18)  SpO2: 95% (02 Nov 2024 04:40) (92% - 96%)    Parameters below as of 02 Nov 2024 04:40  Patient On (Oxygen Delivery Method): room air        T(C): 37 (11-02-24 @ 04:40), Max: 37 (11-01-24 @ 13:01)  T(C): 37 (11-02-24 @ 04:40), Max: 39.4 (10-30-24 @ 12:26)  T(C): 37 (11-02-24 @ 04:40), Max: 39.4 (10-30-24 @ 12:26)    PHYSICAL EXAM:  HEENT: NC atraumatic  Neck: supple  Respiratory: no accessory muscle use, breathing comfortably  Cardiovascular: distant  Gastrointestinal: normal appearing, nondistended  Extremities: no clubbing, no cyanosis,        LABS:                          9.7    9.79  )-----------( 463      ( 01 Nov 2024 11:19 )             30.3       WBC  9.79 11-01 @ 11:19  9.05 10-31 @ 07:08  12.12 10-30 @ 13:00      11-01    140  |  105  |  10  ----------------------------<  225[H]  4.0   |  28  |  0.84    Ca    8.6      01 Nov 2024 11:19        Creatinine: 0.84 mg/dL (11-01-24 @ 11:19)  Creatinine: 0.64 mg/dL (10-31-24 @ 07:08)  Creatinine: 0.85 mg/dL (10-30-24 @ 13:00)        Urinalysis Basic - ( 01 Nov 2024 11:19 )    Color: x / Appearance: x / SG: x / pH: x  Gluc: 225 mg/dL / Ketone: x  / Bili: x / Urobili: x   Blood: x / Protein: x / Nitrite: x   Leuk Esterase: x / RBC: x / WBC x   Sq Epi: x / Non Sq Epi: x / Bacteria: x            INFLAMMATORY MARKERS      MICROBIOLOGY:    Culture - Tissue with Gram Stain (10.30.24 @ 11:09)    Gram Stain:   Rare polymorphonuclear leukocytes seen per low power field  Numerous Gram Negative Rods seen per oil power field  Few Gram positive cocci in pairs seen per oil power field   Specimen Source: Tissue   Culture Results:   Numerous Escherichia coli  Few Enterococcus avium        RADIOLOGY & ADDITIONAL STUDIES:

## 2024-11-02 NOTE — REVIEW OF SYSTEMS
[Skin Wound] : skin wound [Limb Weakness] : limb weakness [Easy Bleeding] : a tendency for easy bleeding [Fever] : no fever [Chills] : no chills [Eye Pain] : no eye pain [Earache] : no earache [Loss Of Hearing] : no hearing loss [Chest Pain] : no chest pain [Shortness Of Breath] : no shortness of breath [Abdominal Pain] : no abdominal pain [Anxiety] : no anxiety [FreeTextEntry5] : htn, hld diabetic cardio vascular disease  [FreeTextEntry9] : s/p left foot TMA w/Achilles tenotomy [de-identified] : left foot DFU3, s/p TMA-  healed, right heel wound down to bone , s/p partial calcanectomy with (+) OM on path [de-identified] : diabetic neuropathy [de-identified] : IDDM with neuropathy  [FreeTextEntry1] : 2505

## 2024-11-02 NOTE — REVIEW OF SYSTEMS
Statement Selected [Skin Wound] : skin wound [Limb Weakness] : limb weakness [Easy Bleeding] : a tendency for easy bleeding [Fever] : no fever [Chills] : no chills [Eye Pain] : no eye pain [Earache] : no earache [Loss Of Hearing] : no hearing loss [Chest Pain] : no chest pain [Shortness Of Breath] : no shortness of breath [Abdominal Pain] : no abdominal pain [Anxiety] : no anxiety [FreeTextEntry5] : htn, hld diabetic cardio vascular disease  [FreeTextEntry9] : s/p left foot TMA w/Achilles tenotomy [de-identified] : left foot DFU3, s/p TMA-  healed, right heel wound down to bone , s/p partial calcanectomy with (+) OM on path [de-identified] : diabetic neuropathy [de-identified] : IDDM with neuropathy  [FreeTextEntry1] : 3100

## 2024-11-02 NOTE — REVIEW OF SYSTEMS
[Skin Wound] : skin wound [Limb Weakness] : limb weakness [Easy Bleeding] : a tendency for easy bleeding [Fever] : no fever [Chills] : no chills [Eye Pain] : no eye pain [Earache] : no earache [Loss Of Hearing] : no hearing loss [Chest Pain] : no chest pain [Shortness Of Breath] : no shortness of breath [Abdominal Pain] : no abdominal pain [Anxiety] : no anxiety [FreeTextEntry5] : htn, hld diabetic cardio vascular disease  [FreeTextEntry9] : s/p left foot TMA w/Achilles tenotomy [de-identified] : left foot DFU3, s/p TMA-  healed, right heel wound down to bone , s/p partial calcanectomy with (+) OM on path [de-identified] : diabetic neuropathy [de-identified] : IDDM with neuropathy  [FreeTextEntry1] : 8970

## 2024-11-02 NOTE — PROCEDURE
[FreeTextEntry9] : 1125 [de-identified] : Right plantar midfoot abscess with edema and erythema, [de-identified] : Anita [FreeTextEntry6] : Right plantar midfoot abscess with edema and erythema, [FreeTextEntry7] : Right plantar midfoot abscess with edema and erythema, [de-identified] : 5cc [de-identified] : Soft tissue culture

## 2024-11-02 NOTE — PHYSICAL EXAM
[Abdominal Pad] : Abdominal Pad [4 x 4] : 4 x 4  [2 x 2] : 2 x 2  [1+] : left 1+ [Ankle Swelling (On Exam)] : present [Ankle Swelling On The Left] : moderate [Skin Ulcer] : ulcer [Alert] : alert [Oriented to Person] : oriented to person [Oriented to Place] : oriented to place [Calm] : calm [Varicose Veins Of Lower Extremities] : not present [] : not present [de-identified] : calm [de-identified] : s/p left TMA and Achilles tenotomy, pt has been non ambulatory since february  [de-identified] : Right plantar midfoot abscess with edema and erythema, Right posterior heel wound of skin, subcutaneous tissue, fat with granulation tissue, serous drainage  [de-identified] : IDDM with neuropathy  [FreeTextEntry1] : Right heel- ulcer with distal abscess present  [FreeTextEntry2] : 14.0 [FreeTextEntry3] : 7.6 [FreeTextEntry4] : 2.0 [de-identified] : Serous/sanguinous/purulent  [de-identified] : 2.5 cm @ 6 o clock [de-identified] : distal abscess drained by DPM- packed with Iodoform rope by DPM [de-identified] : I and D  [de-identified] : silver alginate  [de-identified] : Mechanically cleansed with sterile gauze and normal saline.   Bandage applied by DPM prior to ED admission  Tissue cultures obtained and sent to lab   [FreeTextEntry7] : right dorsal foot  [FreeTextEntry8] : 1.7 [FreeTextEntry9] : 1.7 [de-identified] : 0.1 [de-identified] : Serous/sanguinous [de-identified] : Medihoney  [de-identified] : Mechanically cleansed with sterile gauze and normal saline. Kerlix  [de-identified] : Right medial leg - fragile skin [de-identified] : 1.7 [de-identified] : 0.3 [de-identified] : 0.0 [de-identified] : Dry dressing  [de-identified] : Mechanically cleansed with sterile gauze and normal saline. Paper tape  [TWNoteComboBox4] : Moderate [TWNoteComboBox5] : Yes [de-identified] : No [de-identified] : Erythema [de-identified] : None [de-identified] : None [de-identified] : 100% [de-identified] : No [de-identified] : Other [de-identified] : 3x Weekly [de-identified] : Primary Dressing [de-identified] : Small [de-identified] : Normal [de-identified] : None [de-identified] : 100% [de-identified] : None [de-identified] : Yes [de-identified] : 3x Weekly [de-identified] : Primary Dressing [de-identified] : None [de-identified] : No [de-identified] : Normal [de-identified] : None [de-identified] : None [de-identified] : None [de-identified] : No [de-identified] : 3x Weekly [de-identified] : Secondary Dressing

## 2024-11-02 NOTE — PROCEDURE
[FreeTextEntry9] : 1120 [de-identified] : Right plantar midfoot abscess with edema and erythema, [de-identified] : Anita [FreeTextEntry6] : Right plantar midfoot abscess with edema and erythema, [FreeTextEntry7] : Right plantar midfoot abscess with edema and erythema, [de-identified] : 5cc [de-identified] : Soft tissue culture

## 2024-11-02 NOTE — PROCEDURE
[FreeTextEntry9] : 1120 [de-identified] : Right plantar midfoot abscess with edema and erythema, [de-identified] : Anita [FreeTextEntry6] : Right plantar midfoot abscess with edema and erythema, [FreeTextEntry7] : Right plantar midfoot abscess with edema and erythema, [de-identified] : 5cc [de-identified] : Soft tissue culture

## 2024-11-02 NOTE — PROCEDURE
[FreeTextEntry9] : 112 [de-identified] : Right plantar midfoot abscess with edema and erythema, [de-identified] : Anita [FreeTextEntry6] : Right plantar midfoot abscess with edema and erythema, [FreeTextEntry7] : Right plantar midfoot abscess with edema and erythema, [de-identified] : 5cc [de-identified] : Soft tissue culture

## 2024-11-02 NOTE — PHYSICAL EXAM
[Abdominal Pad] : Abdominal Pad [4 x 4] : 4 x 4  [2 x 2] : 2 x 2  [1+] : left 1+ [Ankle Swelling (On Exam)] : present [Ankle Swelling On The Left] : moderate [Skin Ulcer] : ulcer [Alert] : alert [Oriented to Person] : oriented to person [Oriented to Place] : oriented to place [Calm] : calm [Varicose Veins Of Lower Extremities] : not present [] : not present [de-identified] : calm [de-identified] : s/p left TMA and Achilles tenotomy, pt has been non ambulatory since february  [de-identified] : Right plantar midfoot abscess with edema and erythema, Right posterior heel wound of skin, subcutaneous tissue, fat with granulation tissue, serous drainage  [de-identified] : IDDM with neuropathy  [FreeTextEntry1] : Right heel- ulcer with distal abscess present  [FreeTextEntry2] : 14.0 [FreeTextEntry3] : 7.6 [FreeTextEntry4] : 2.0 [de-identified] : Serous/sanguinous/purulent  [de-identified] : 2.5 cm @ 6 o clock [de-identified] : distal abscess drained by DPM- packed with Iodoform rope by DPM [de-identified] : I and D  [de-identified] : silver alginate  [de-identified] : Mechanically cleansed with sterile gauze and normal saline.   Bandage applied by DPM prior to ED admission  Tissue cultures obtained and sent to lab   [FreeTextEntry7] : right dorsal foot  [FreeTextEntry8] : 1.7 [FreeTextEntry9] : 1.7 [de-identified] : 0.1 [de-identified] : Serous/sanguinous [de-identified] : Medihoney  [de-identified] : Mechanically cleansed with sterile gauze and normal saline. Kerlix  [de-identified] : Right medial leg - fragile skin [de-identified] : 1.7 [de-identified] : 0.3 [de-identified] : 0.0 [de-identified] : Dry dressing  [de-identified] : Mechanically cleansed with sterile gauze and normal saline. Paper tape  [TWNoteComboBox4] : Moderate [TWNoteComboBox5] : Yes [de-identified] : No [de-identified] : Erythema [de-identified] : None [de-identified] : None [de-identified] : 100% [de-identified] : No [de-identified] : Other [de-identified] : 3x Weekly [de-identified] : Primary Dressing [de-identified] : Small [de-identified] : Normal [de-identified] : None [de-identified] : 100% [de-identified] : None [de-identified] : Yes [de-identified] : 3x Weekly [de-identified] : Primary Dressing [de-identified] : None [de-identified] : No [de-identified] : Normal [de-identified] : None [de-identified] : None [de-identified] : None [de-identified] : No [de-identified] : 3x Weekly [de-identified] : Secondary Dressing

## 2024-11-02 NOTE — PHYSICAL EXAM
[Abdominal Pad] : Abdominal Pad [4 x 4] : 4 x 4  [2 x 2] : 2 x 2  [1+] : left 1+ [Ankle Swelling (On Exam)] : present [Ankle Swelling On The Left] : moderate [Skin Ulcer] : ulcer [Alert] : alert [Oriented to Person] : oriented to person [Oriented to Place] : oriented to place [Calm] : calm [Varicose Veins Of Lower Extremities] : not present [] : not present [de-identified] : calm [de-identified] : s/p left TMA and Achilles tenotomy, pt has been non ambulatory since february  [de-identified] : Right plantar midfoot abscess with edema and erythema, Right posterior heel wound of skin, subcutaneous tissue, fat with granulation tissue, serous drainage  [de-identified] : IDDM with neuropathy  [FreeTextEntry1] : Right heel- ulcer with distal abscess present  [FreeTextEntry2] : 14.0 [FreeTextEntry3] : 7.6 [FreeTextEntry4] : 2.0 [de-identified] : Serous/sanguinous/purulent  [de-identified] : 2.5 cm @ 6 o clock [de-identified] : distal abscess drained by DPM- packed with Iodoform rope by DPM [de-identified] : I and D  [de-identified] : silver alginate  [de-identified] : Mechanically cleansed with sterile gauze and normal saline.   Bandage applied by DPM prior to ED admission  Tissue cultures obtained and sent to lab   [FreeTextEntry7] : right dorsal foot  [FreeTextEntry8] : 1.7 [FreeTextEntry9] : 1.7 [de-identified] : 0.1 [de-identified] : Serous/sanguinous [de-identified] : Medihoney  [de-identified] : Mechanically cleansed with sterile gauze and normal saline. Kerlix  [de-identified] : Right medial leg - fragile skin [de-identified] : 1.7 [de-identified] : 0.3 [de-identified] : 0.0 [de-identified] : Dry dressing  [de-identified] : Mechanically cleansed with sterile gauze and normal saline. Paper tape  [TWNoteComboBox4] : Moderate [TWNoteComboBox5] : Yes [de-identified] : No [de-identified] : Erythema [de-identified] : None [de-identified] : None [de-identified] : 100% [de-identified] : No [de-identified] : Other [de-identified] : 3x Weekly [de-identified] : Primary Dressing [de-identified] : Small [de-identified] : Normal [de-identified] : None [de-identified] : 100% [de-identified] : None [de-identified] : Yes [de-identified] : 3x Weekly [de-identified] : Primary Dressing [de-identified] : None [de-identified] : No [de-identified] : Normal [de-identified] : None [de-identified] : None [de-identified] : None [de-identified] : No [de-identified] : 3x Weekly [de-identified] : Secondary Dressing

## 2024-11-02 NOTE — PROGRESS NOTE ADULT - ASSESSMENT
54m with dm, cad, pad, dvt atrial fib, hypertension, stroke, indwelling jacques, PE, indwelling jacques, prior diagnosis of Right Heel Osteomyelitis  Culture - Wound Aerobic/Anaerobic (09.05.24 @ 12:55):  Escherichia coli ESBL (-  Piperacillin/Tazobactam: R <=8), Citrobacter freundii, Providencia stuartii sent from wound care with fever, and infected wound to right foot, drained in the office and sent for culture, pt has hx of recurrent infections and severe reaction to ertapenem.    RECOMMENDATIONS  Right heel abscess with surrounding cellulitis  Culture - Wound Aerobic/Anaerobic (09.05.24 @ 12:55):  Escherichia coli ESBL (-  Piperacillin/Tazobactam: R <=8), Citrobacter freundii, Providencia stuartii  Culture - Tissue with Gram Stain (10.30.24 @ 11:09) RNumerous Escherichia coli, Few Enterococcus avium  - coordinate with podiatry regarding any further imaging  challenging antimicrobial Rx with severe carbapenem intolerance so rec  Zosyn started 10/30-continue  Vancomycin started 10/30-will dc with no evidence of MRSA    Thank you for consulting us and involving us in the management of this most interesting and challenging case.  We will follow along in the care of this patient. Please call us at [phone] or text me directly on my cell# at [phone] with any concerns.

## 2024-11-02 NOTE — PROGRESS NOTE ADULT - SUBJECTIVE AND OBJECTIVE BOX
Date of Service 11-02-24 @ 13:09    Patient is a 54y old  Male who presents with a chief complaint of heel abscess (01 Nov 2024 15:27)      INTERVAL /OVERNIGHT EVENTS: wants more pain meds    MEDICATIONS  (STANDING):  aMIOdarone    Tablet 200 milliGRAM(s) Oral daily  apixaban 5 milliGRAM(s) Oral every 12 hours  ascorbic acid 500 milliGRAM(s) Oral daily  aspirin  chewable 81 milliGRAM(s) Oral daily  atorvastatin 40 milliGRAM(s) Oral at bedtime  cetirizine 10 milliGRAM(s) Oral daily  dextrose 5%. 1000 milliLiter(s) (100 mL/Hr) IV Continuous <Continuous>  dextrose 5%. 1000 milliLiter(s) (50 mL/Hr) IV Continuous <Continuous>  dextrose 50% Injectable 25 Gram(s) IV Push once  dextrose 50% Injectable 12.5 Gram(s) IV Push once  dextrose 50% Injectable 25 Gram(s) IV Push once  dextrose Oral Gel 15 Gram(s) Oral once  digoxin     Tablet 125 MICROGram(s) Oral daily  famotidine    Tablet 20 milliGRAM(s) Oral daily  ferrous    sulfate 325 milliGRAM(s) Oral daily  gabapentin 100 milliGRAM(s) Oral three times a day  glucagon  Injectable 1 milliGRAM(s) IntraMuscular once  insulin lispro (ADMELOG) corrective regimen sliding scale   SubCutaneous at bedtime  insulin lispro (ADMELOG) corrective regimen sliding scale   SubCutaneous three times a day before meals  melatonin 5 milliGRAM(s) Oral at bedtime  metoprolol succinate  milliGRAM(s) Oral every 12 hours  midodrine. 15 milliGRAM(s) Oral three times a day  naloxegol 25 milliGRAM(s) Oral daily  oxybutynin XL 10 milliGRAM(s) Oral at bedtime  pantoprazole    Tablet 40 milliGRAM(s) Oral before breakfast  piperacillin/tazobactam IVPB.. 3.375 Gram(s) IV Intermittent every 8 hours  polyethylene glycol 3350 17 Gram(s) Oral daily  senna 2 Tablet(s) Oral at bedtime  sodium chloride 0.9%. 1000 milliLiter(s) (100 mL/Hr) IV Continuous <Continuous>  sucralfate 1 Gram(s) Oral two times a day  tiotropium 2.5 MICROgram(s)/olodaterol 2.5 MICROgram(s) (STIOLTO) Inhaler 2 Puff(s) Inhalation daily    MEDICATIONS  (PRN):  HYDROmorphone   Tablet 2 milliGRAM(s) Oral every 4 hours PRN Severe Pain (7 - 10)  ondansetron Injectable 4 milliGRAM(s) IV Push every 8 hours PRN Nausea and/or Vomiting  oxyCODONE    IR 5 milliGRAM(s) Oral every 4 hours PRN Moderate Pain (4 - 6)  traMADol 50 milliGRAM(s) Oral every 4 hours PRN Mild Pain (1 - 3)      Allergies    fish (Hives)  ertapenem (Blisters; Rash)    Intolerances        REVIEW OF SYSTEMS:  CONSTITUTIONAL: No fever, weight loss, or fatigue  EYES: No eye pain, visual disturbances, or discharge  ENMT:  No difficulty hearing, tinnitus, vertigo; No sinus or throat pain  NECK: No pain or stiffness  RESPIRATORY: No cough, wheezing, chills or hemoptysis; No shortness of breath  CARDIOVASCULAR: No chest pain, palpitations, dizziness, or leg swelling  GASTROINTESTINAL: No abdominal or epigastric pain. No nausea, vomiting, or hematemesis; No diarrhea or constipation. No melena or hematochezia.  GENITOURINARY: No dysuria, frequency, hematuria, or incontinence  NEUROLOGICAL: No headaches, memory loss, loss of strength, numbness, or tremors  SKIN: leg / foot wounds  LYMPH NODES: No enlarged glands  ENDOCRINE: No heat or cold intolerance; No hair loss; No polydipsia or polyuria  MUSCULOSKELETAL: No joint pain or swelling; No muscle, back, or extremity pain  PSYCHIATRIC: No depression, anxiety, mood swings, or difficulty sleeping  HEME/LYMPH: No easy bruising, or bleeding gums  ALLERGY AND IMMUNOLOGIC: No hives or eczema    Vital Signs Last 24 Hrs  T(C): 36.7 (02 Nov 2024 11:56), Max: 37 (02 Nov 2024 04:40)  T(F): 98.1 (02 Nov 2024 11:56), Max: 98.6 (02 Nov 2024 04:40)  HR: 66 (02 Nov 2024 11:56) (63 - 67)  BP: 130/79 (02 Nov 2024 11:56) (106/62 - 133/79)  BP(mean): --  RR: 18 (02 Nov 2024 11:56) (17 - 18)  SpO2: 97% (02 Nov 2024 11:56) (92% - 97%)    Parameters below as of 02 Nov 2024 11:56  Patient On (Oxygen Delivery Method): room air        PHYSICAL EXAM:  GENERAL: NAD, well-groomed, well-developed  HEAD:  Atraumatic, Normocephalic  EYES: EOMI, PERRLA, conjunctiva and sclera clear  ENMT: No tonsillar erythema, exudates, or enlargement; Moist mucous membranes, Good dentition, No lesions  NECK: Supple, No JVD, Normal thyroid  NERVOUS SYSTEM:  Alert & Oriented X3, Good concentration; Motor Strength 5/5 B/L upper and lower extremities; DTRs 2+ intact and symmetric  CHEST/LUNG: Clear to auscultation bilaterally; No rales, rhonchi, wheezing, or rubs  HEART: Regular rate and rhythm; No murmurs, rubs, or gallops  ABDOMEN: Soft, Nontender, Nondistended; Bowel sounds present  EXTREMITIES:  2+ Peripheral Pulses, No clubbing, cyanosis, or edema  LYMPH: No lymphadenopathy noted  SKIN: LE foot diabetic wounds    LABS:      Ca    8.6        01 Nov 2024 11:19        Urinalysis Basic - ( 01 Nov 2024 11:19 )    Color: x / Appearance: x / SG: x / pH: x  Gluc: 225 mg/dL / Ketone: x  / Bili: x / Urobili: x   Blood: x / Protein: x / Nitrite: x   Leuk Esterase: x / RBC: x / WBC x   Sq Epi: x / Non Sq Epi: x / Bacteria: x      CAPILLARY BLOOD GLUCOSE      POCT Blood Glucose.: 193 mg/dL (02 Nov 2024 11:36)  POCT Blood Glucose.: 163 mg/dL (02 Nov 2024 07:32)  POCT Blood Glucose.: 191 mg/dL (01 Nov 2024 21:32)  POCT Blood Glucose.: 250 mg/dL (01 Nov 2024 16:43)      RADIOLOGY & ADDITIONAL TESTS:    Notes Reviewed:  [x ] YES  [ ] NO    Care Discussed with Consultants/Other Providers [x ] YES  [ ] NO

## 2024-11-02 NOTE — PHYSICAL EXAM
[Abdominal Pad] : Abdominal Pad [4 x 4] : 4 x 4  [2 x 2] : 2 x 2  [1+] : left 1+ [Ankle Swelling (On Exam)] : present [Ankle Swelling On The Left] : moderate [Skin Ulcer] : ulcer [Alert] : alert [Oriented to Person] : oriented to person [Oriented to Place] : oriented to place [Calm] : calm [Varicose Veins Of Lower Extremities] : not present [] : not present [de-identified] : calm [de-identified] : s/p left TMA and Achilles tenotomy, pt has been non ambulatory since february  [de-identified] : Right plantar midfoot abscess with edema and erythema, Right posterior heel wound of skin, subcutaneous tissue, fat with granulation tissue, serous drainage  [de-identified] : IDDM with neuropathy  [FreeTextEntry1] : Right heel- ulcer with distal abscess present  [FreeTextEntry2] : 14.0 [FreeTextEntry3] : 7.6 [FreeTextEntry4] : 2.0 [de-identified] : Serous/sanguinous/purulent  [de-identified] : 2.5 cm @ 6 o clock [de-identified] : distal abscess drained by DPM- packed with Iodoform rope by DPM [de-identified] : I and D  [de-identified] : silver alginate  [de-identified] : Mechanically cleansed with sterile gauze and normal saline.   Bandage applied by DPM prior to ED admission  Tissue cultures obtained and sent to lab   [FreeTextEntry7] : right dorsal foot  [FreeTextEntry8] : 1.7 [FreeTextEntry9] : 1.7 [de-identified] : 0.1 [de-identified] : Serous/sanguinous [de-identified] : Medihoney  [de-identified] : Mechanically cleansed with sterile gauze and normal saline. Kerlix  [de-identified] : Right medial leg - fragile skin [de-identified] : 1.7 [de-identified] : 0.3 [de-identified] : 0.0 [de-identified] : Dry dressing  [de-identified] : Mechanically cleansed with sterile gauze and normal saline. Paper tape  [TWNoteComboBox4] : Moderate [TWNoteComboBox5] : Yes [de-identified] : No [de-identified] : Erythema [de-identified] : None [de-identified] : None [de-identified] : 100% [de-identified] : No [de-identified] : Other [de-identified] : 3x Weekly [de-identified] : Primary Dressing [de-identified] : Small [de-identified] : Normal [de-identified] : None [de-identified] : 100% [de-identified] : None [de-identified] : Yes [de-identified] : 3x Weekly [de-identified] : Primary Dressing [de-identified] : None [de-identified] : No [de-identified] : Normal [de-identified] : None [de-identified] : None [de-identified] : None [de-identified] : No [de-identified] : 3x Weekly [de-identified] : Secondary Dressing

## 2024-11-03 LAB
-  CLINDAMYCIN: SIGNIFICANT CHANGE UP
-  ERYTHROMYCIN: SIGNIFICANT CHANGE UP
-  GENTAMICIN: SIGNIFICANT CHANGE UP
-  OXACILLIN: SIGNIFICANT CHANGE UP
-  PENICILLIN: SIGNIFICANT CHANGE UP
-  RIFAMPIN: SIGNIFICANT CHANGE UP
-  TETRACYCLINE: SIGNIFICANT CHANGE UP
-  TRIMETHOPRIM/SULFAMETHOXAZOLE: SIGNIFICANT CHANGE UP
-  VANCOMYCIN: SIGNIFICANT CHANGE UP
ALBUMIN SERPL ELPH-MCNC: 2.2 G/DL — LOW (ref 3.3–5)
ALP SERPL-CCNC: 92 U/L — SIGNIFICANT CHANGE UP (ref 40–120)
ALT FLD-CCNC: 13 U/L — SIGNIFICANT CHANGE UP (ref 12–78)
ANION GAP SERPL CALC-SCNC: 10 MMOL/L — SIGNIFICANT CHANGE UP (ref 5–17)
AST SERPL-CCNC: 13 U/L — LOW (ref 15–37)
BILIRUB SERPL-MCNC: 0.3 MG/DL — SIGNIFICANT CHANGE UP (ref 0.2–1.2)
BUN SERPL-MCNC: 8 MG/DL — SIGNIFICANT CHANGE UP (ref 7–23)
CALCIUM SERPL-MCNC: 8.9 MG/DL — SIGNIFICANT CHANGE UP (ref 8.5–10.1)
CHLORIDE SERPL-SCNC: 103 MMOL/L — SIGNIFICANT CHANGE UP (ref 96–108)
CO2 SERPL-SCNC: 28 MMOL/L — SIGNIFICANT CHANGE UP (ref 22–31)
CREAT SERPL-MCNC: 0.79 MG/DL — SIGNIFICANT CHANGE UP (ref 0.5–1.3)
CULTURE RESULTS: ABNORMAL
EGFR: 104 ML/MIN/1.73M2 — SIGNIFICANT CHANGE UP
GLUCOSE BLDC GLUCOMTR-MCNC: 181 MG/DL — HIGH (ref 70–99)
GLUCOSE BLDC GLUCOMTR-MCNC: 197 MG/DL — HIGH (ref 70–99)
GLUCOSE BLDC GLUCOMTR-MCNC: 247 MG/DL — HIGH (ref 70–99)
GLUCOSE BLDC GLUCOMTR-MCNC: 309 MG/DL — HIGH (ref 70–99)
GLUCOSE SERPL-MCNC: 165 MG/DL — HIGH (ref 70–99)
HCT VFR BLD CALC: 32.5 % — LOW (ref 39–50)
HGB BLD-MCNC: 10.1 G/DL — LOW (ref 13–17)
MCHC RBC-ENTMCNC: 29.1 PG — SIGNIFICANT CHANGE UP (ref 27–34)
MCHC RBC-ENTMCNC: 31.1 G/DL — LOW (ref 32–36)
MCV RBC AUTO: 93.7 FL — SIGNIFICANT CHANGE UP (ref 80–100)
METHOD TYPE: SIGNIFICANT CHANGE UP
NRBC # BLD: 0 /100 WBCS — SIGNIFICANT CHANGE UP (ref 0–0)
ORGANISM # SPEC MICROSCOPIC CNT: ABNORMAL
ORGANISM # SPEC MICROSCOPIC CNT: SIGNIFICANT CHANGE UP
PLATELET # BLD AUTO: 473 K/UL — HIGH (ref 150–400)
POTASSIUM SERPL-MCNC: 3.7 MMOL/L — SIGNIFICANT CHANGE UP (ref 3.5–5.3)
POTASSIUM SERPL-SCNC: 3.7 MMOL/L — SIGNIFICANT CHANGE UP (ref 3.5–5.3)
PROT SERPL-MCNC: 6.2 G/DL — SIGNIFICANT CHANGE UP (ref 6–8.3)
RBC # BLD: 3.47 M/UL — LOW (ref 4.2–5.8)
RBC # FLD: 15.3 % — HIGH (ref 10.3–14.5)
SODIUM SERPL-SCNC: 141 MMOL/L — SIGNIFICANT CHANGE UP (ref 135–145)
SPECIMEN SOURCE: SIGNIFICANT CHANGE UP
WBC # BLD: 10.33 K/UL — SIGNIFICANT CHANGE UP (ref 3.8–10.5)
WBC # FLD AUTO: 10.33 K/UL — SIGNIFICANT CHANGE UP (ref 3.8–10.5)

## 2024-11-03 PROCEDURE — 76376 3D RENDER W/INTRP POSTPROCES: CPT | Mod: 26

## 2024-11-03 PROCEDURE — 73701 CT LOWER EXTREMITY W/DYE: CPT | Mod: 26,RT

## 2024-11-03 RX ADMIN — Medication 2 TABLET(S): at 21:05

## 2024-11-03 RX ADMIN — GABAPENTIN 100 MILLIGRAM(S): 300 CAPSULE ORAL at 05:09

## 2024-11-03 RX ADMIN — Medication 5 MILLIGRAM(S): at 21:05

## 2024-11-03 RX ADMIN — Medication 100 MILLIGRAM(S): at 05:08

## 2024-11-03 RX ADMIN — PIPERACILLIN AND TAZOBACTAM 25 GRAM(S): .5; 4 INJECTION, POWDER, LYOPHILIZED, FOR SOLUTION INTRAVENOUS at 21:04

## 2024-11-03 RX ADMIN — Medication 40 MILLIGRAM(S): at 21:04

## 2024-11-03 RX ADMIN — TIOTROPIUM BROMIDE AND OLODATEROL 2 PUFF(S): 3.124; 2.736 SPRAY, METERED RESPIRATORY (INHALATION) at 06:36

## 2024-11-03 RX ADMIN — GABAPENTIN 100 MILLIGRAM(S): 300 CAPSULE ORAL at 13:53

## 2024-11-03 RX ADMIN — PIPERACILLIN AND TAZOBACTAM 25 GRAM(S): .5; 4 INJECTION, POWDER, LYOPHILIZED, FOR SOLUTION INTRAVENOUS at 13:53

## 2024-11-03 RX ADMIN — Medication 500 MILLIGRAM(S): at 11:22

## 2024-11-03 RX ADMIN — Medication 2: at 16:50

## 2024-11-03 RX ADMIN — Medication 2 MILLIGRAM(S): at 22:18

## 2024-11-03 RX ADMIN — Medication 325 MILLIGRAM(S): at 11:21

## 2024-11-03 RX ADMIN — APIXABAN 5 MILLIGRAM(S): 5 TABLET, FILM COATED ORAL at 05:08

## 2024-11-03 RX ADMIN — GABAPENTIN 100 MILLIGRAM(S): 300 CAPSULE ORAL at 21:04

## 2024-11-03 RX ADMIN — Medication 2: at 21:50

## 2024-11-03 RX ADMIN — MIDODRINE HYDROCHLORIDE 15 MILLIGRAM(S): 2.5 TABLET ORAL at 17:26

## 2024-11-03 RX ADMIN — Medication 1: at 11:28

## 2024-11-03 RX ADMIN — Medication 2 MILLIGRAM(S): at 05:10

## 2024-11-03 RX ADMIN — NALOXEGOL OXALATE 25 MILLIGRAM(S): 12.5 TABLET, FILM COATED ORAL at 11:21

## 2024-11-03 RX ADMIN — PIPERACILLIN AND TAZOBACTAM 25 GRAM(S): .5; 4 INJECTION, POWDER, LYOPHILIZED, FOR SOLUTION INTRAVENOUS at 05:09

## 2024-11-03 RX ADMIN — Medication 2 MILLIGRAM(S): at 21:48

## 2024-11-03 RX ADMIN — OXYBUTYNIN CHLORIDE 10 MILLIGRAM(S): 5 TABLET ORAL at 22:36

## 2024-11-03 RX ADMIN — FAMOTIDINE 20 MILLIGRAM(S): 10 INJECTION INTRAVENOUS at 11:21

## 2024-11-03 RX ADMIN — Medication 81 MILLIGRAM(S): at 11:22

## 2024-11-03 RX ADMIN — Medication 2 MILLIGRAM(S): at 17:26

## 2024-11-03 RX ADMIN — Medication 125 MICROGRAM(S): at 05:08

## 2024-11-03 RX ADMIN — Medication 200 MILLIGRAM(S): at 05:08

## 2024-11-03 RX ADMIN — Medication 1: at 07:58

## 2024-11-03 RX ADMIN — Medication 2 MILLIGRAM(S): at 05:40

## 2024-11-03 RX ADMIN — PANTOPRAZOLE SODIUM 40 MILLIGRAM(S): 40 TABLET, DELAYED RELEASE ORAL at 05:08

## 2024-11-03 RX ADMIN — CETIRIZINE HCL 10 MILLIGRAM(S): 10 TABLET ORAL at 11:22

## 2024-11-03 RX ADMIN — SUCRALFATE 1 GRAM(S): 1 SUSPENSION ORAL at 05:08

## 2024-11-03 RX ADMIN — Medication 100 MILLIGRAM(S): at 17:26

## 2024-11-03 RX ADMIN — SUCRALFATE 1 GRAM(S): 1 SUSPENSION ORAL at 17:26

## 2024-11-03 RX ADMIN — APIXABAN 5 MILLIGRAM(S): 5 TABLET, FILM COATED ORAL at 17:26

## 2024-11-03 RX ADMIN — Medication 2 MILLIGRAM(S): at 11:24

## 2024-11-03 RX ADMIN — MIDODRINE HYDROCHLORIDE 15 MILLIGRAM(S): 2.5 TABLET ORAL at 05:08

## 2024-11-03 RX ADMIN — Medication 2 MILLIGRAM(S): at 12:24

## 2024-11-03 NOTE — PROGRESS NOTE ADULT - SUBJECTIVE AND OBJECTIVE BOX
Date of Service: 11-03-24 @ 11:33    Patient is a 54y old  Male who presents with a chief complaint of heel abscess (02 Nov 2024 13:07)      INTERVAL HPI/OVERNIGHT EVENTS: Patient seen and examined. NAD. No complaints.    Vital Signs Last 24 Hrs  T(C): 36.9 (03 Nov 2024 05:07), Max: 36.9 (03 Nov 2024 05:07)  T(F): 98.4 (03 Nov 2024 05:07), Max: 98.4 (03 Nov 2024 05:07)  HR: 74 (03 Nov 2024 05:07) (61 - 74)  BP: 130/74 (03 Nov 2024 05:07) (118/69 - 130/79)  BP(mean): --  RR: 18 (03 Nov 2024 05:07) (17 - 18)  SpO2: 95% (03 Nov 2024 05:07) (95% - 97%)    Parameters below as of 03 Nov 2024 05:07  Patient On (Oxygen Delivery Method): room air        11-03    141  |  103  |  8   ----------------------------<  165[H]  3.7   |  28  |  0.79    Ca    8.9      03 Nov 2024 08:00    TPro  6.2  /  Alb  2.2[L]  /  TBili  0.3  /  DBili  x   /  AST  13[L]  /  ALT  13  /  AlkPhos  92  11-03                          10.1   10.33 )-----------( 473      ( 03 Nov 2024 08:00 )             32.5       CAPILLARY BLOOD GLUCOSE      POCT Blood Glucose.: 197 mg/dL (03 Nov 2024 11:26)  POCT Blood Glucose.: 181 mg/dL (03 Nov 2024 07:43)  POCT Blood Glucose.: 224 mg/dL (02 Nov 2024 21:35)  POCT Blood Glucose.: 197 mg/dL (02 Nov 2024 16:51)  POCT Blood Glucose.: 193 mg/dL (02 Nov 2024 11:36)    Urinalysis Basic - ( 03 Nov 2024 08:00 )    Color: x / Appearance: x / SG: x / pH: x  Gluc: 165 mg/dL / Ketone: x  / Bili: x / Urobili: x   Blood: x / Protein: x / Nitrite: x   Leuk Esterase: x / RBC: x / WBC x   Sq Epi: x / Non Sq Epi: x / Bacteria: x              aMIOdarone    Tablet 200 milliGRAM(s) Oral daily  apixaban 5 milliGRAM(s) Oral every 12 hours  ascorbic acid 500 milliGRAM(s) Oral daily  aspirin  chewable 81 milliGRAM(s) Oral daily  atorvastatin 40 milliGRAM(s) Oral at bedtime  cetirizine 10 milliGRAM(s) Oral daily  dextrose 5%. 1000 milliLiter(s) IV Continuous <Continuous>  dextrose 5%. 1000 milliLiter(s) IV Continuous <Continuous>  dextrose 50% Injectable 25 Gram(s) IV Push once  dextrose 50% Injectable 12.5 Gram(s) IV Push once  dextrose 50% Injectable 25 Gram(s) IV Push once  dextrose Oral Gel 15 Gram(s) Oral once  digoxin     Tablet 125 MICROGram(s) Oral daily  famotidine    Tablet 20 milliGRAM(s) Oral daily  ferrous    sulfate 325 milliGRAM(s) Oral daily  gabapentin 100 milliGRAM(s) Oral three times a day  glucagon  Injectable 1 milliGRAM(s) IntraMuscular once  HYDROmorphone   Tablet 2 milliGRAM(s) Oral every 4 hours PRN  insulin lispro (ADMELOG) corrective regimen sliding scale   SubCutaneous three times a day before meals  insulin lispro (ADMELOG) corrective regimen sliding scale   SubCutaneous at bedtime  melatonin 5 milliGRAM(s) Oral at bedtime  metoprolol succinate  milliGRAM(s) Oral every 12 hours  midodrine. 15 milliGRAM(s) Oral three times a day  naloxegol 25 milliGRAM(s) Oral daily  ondansetron Injectable 4 milliGRAM(s) IV Push every 8 hours PRN  oxybutynin XL 10 milliGRAM(s) Oral at bedtime  oxyCODONE    IR 5 milliGRAM(s) Oral every 4 hours PRN  pantoprazole    Tablet 40 milliGRAM(s) Oral before breakfast  piperacillin/tazobactam IVPB.. 3.375 Gram(s) IV Intermittent every 8 hours  polyethylene glycol 3350 17 Gram(s) Oral daily  senna 2 Tablet(s) Oral at bedtime  sodium chloride 0.9%. 1000 milliLiter(s) IV Continuous <Continuous>  sucralfate 1 Gram(s) Oral two times a day  tiotropium 2.5 MICROgram(s)/olodaterol 2.5 MICROgram(s) (STIOLTO) Inhaler 2 Puff(s) Inhalation daily  traMADol 50 milliGRAM(s) Oral every 4 hours PRN              REVIEW OF SYSTEMS:  CONSTITUTIONAL: No fever, no weight loss, or no fatigue  NECK: No pain, no stiffness  RESPIRATORY: No cough, no wheezing, no chills, no hemoptysis, No shortness of breath  CARDIOVASCULAR: No chest pain, no palpitations, no dizziness, no leg swelling  GASTROINTESTINAL: No abdominal pain. No nausea, no vomiting, no hematemesis; No diarrhea, no constipation. No melena, no hematochezia.  GENITOURINARY: No dysuria, no frequency, no hematuria, no incontinence  NEUROLOGICAL: No headaches, no loss of strength, no numbness, no tremors  SKIN: No itching, no burning  MUSCULOSKELETAL: No joint pain, no swelling; No muscle, no back, no extremity pain  PSYCHIATRIC: No depression, no mood swings,   HEME/LYMPH: No easy bruising, no bleeding gums  ALLERY AND IMMUNOLOGIC: No hives       Consultant(s) Notes Reviewed:  [X] YES  [ ] NO    PHYSICAL EXAM:  GENERAL: NAD  HEAD:  Atraumatic, Normocephalic  EYES: EOMI, PERRLA, conjunctiva and sclera clear  ENMT: No tonsillar erythema, exudates, or enlargement; Moist mucous membranes  NECK: Supple, No JVD  NERVOUS SYSTEM:  Awake & alert  CHEST/LUNG: Clear to auscultation bilaterally; No rales, rhonchi, wheezing,  HEART: Regular rate and rhythm  ABDOMEN: Soft, Nontender, Nondistended; Bowel sounds present  EXTREMITIES:  No clubbing, cyanosis, or edema  LYMPH: No lymphadenopathy noted  SKIN: No rashes      Advanced care planning discussed with patient/family [X] YES   [ ] NO    Advanced care planning discussed with patient/family. Patient's health status was discussed. All appropriate changes have been made regarding patient's end-of-life care. Advanced care planning forms reviewed/discussed/completed.  20 minutes spent.

## 2024-11-03 NOTE — PROGRESS NOTE ADULT - SUBJECTIVE AND OBJECTIVE BOX
[facility]FECTIOUS [facility]  Juventino Whitten MD PhD, Symone Hickey MD, Anne-Marie Li MD, Jeffery Jacobs MD, Ryan Romeo MD  and providing coverage with Melody Armstrong MD  Providing Infectious Disease Consultations at [facility], [facility], [facility]'s    Office# [phone] to schedule follow up appointments  Answering Service for urgent calls or New Consults [phone]  Cell# to text for urgent issues Juventino Whitten [phone]     infectious diseases progress note:    SARAH MORRISON is a 54y y. o. Male patient    Overnight and events of the last 24hrs reviewed    Allergies    fish (Hives)  ertapenem (Blisters; Rash)    Intolerances        ANTIBIOTICS/RELEVANT:  antimicrobials  piperacillin/tazobactam IVPB.. 3.375 Gram(s) IV Intermittent every 8 hours    immunologic:    OTHER:  aMIOdarone    Tablet 200 milliGRAM(s) Oral daily  apixaban 5 milliGRAM(s) Oral every 12 hours  ascorbic acid 500 milliGRAM(s) Oral daily  aspirin  chewable 81 milliGRAM(s) Oral daily  atorvastatin 40 milliGRAM(s) Oral at bedtime  cetirizine 10 milliGRAM(s) Oral daily  dextrose 5%. 1000 milliLiter(s) IV Continuous <Continuous>  dextrose 5%. 1000 milliLiter(s) IV Continuous <Continuous>  dextrose 50% Injectable 25 Gram(s) IV Push once  dextrose 50% Injectable 12.5 Gram(s) IV Push once  dextrose 50% Injectable 25 Gram(s) IV Push once  dextrose Oral Gel 15 Gram(s) Oral once  digoxin     Tablet 125 MICROGram(s) Oral daily  famotidine    Tablet 20 milliGRAM(s) Oral daily  ferrous    sulfate 325 milliGRAM(s) Oral daily  gabapentin 100 milliGRAM(s) Oral three times a day  glucagon  Injectable 1 milliGRAM(s) IntraMuscular once  HYDROmorphone   Tablet 2 milliGRAM(s) Oral every 4 hours PRN  insulin lispro (ADMELOG) corrective regimen sliding scale   SubCutaneous three times a day before meals  insulin lispro (ADMELOG) corrective regimen sliding scale   SubCutaneous at bedtime  melatonin 5 milliGRAM(s) Oral at bedtime  metoprolol succinate  milliGRAM(s) Oral every 12 hours  midodrine. 15 milliGRAM(s) Oral three times a day  naloxegol 25 milliGRAM(s) Oral daily  ondansetron Injectable 4 milliGRAM(s) IV Push every 8 hours PRN  oxybutynin XL 10 milliGRAM(s) Oral at bedtime  oxyCODONE    IR 5 milliGRAM(s) Oral every 4 hours PRN  pantoprazole    Tablet 40 milliGRAM(s) Oral before breakfast  polyethylene glycol 3350 17 Gram(s) Oral daily  senna 2 Tablet(s) Oral at bedtime  sodium chloride 0.9%. 1000 milliLiter(s) IV Continuous <Continuous>  sucralfate 1 Gram(s) Oral two times a day  tiotropium 2.5 MICROgram(s)/olodaterol 2.5 MICROgram(s) (STIOLTO) Inhaler 2 Puff(s) Inhalation daily  traMADol 50 milliGRAM(s) Oral every 4 hours PRN      Objective:  Vital Signs Last 24 Hrs  T(C): 36.9 (03 Nov 2024 05:07), Max: 36.9 (03 Nov 2024 05:07)  T(F): 98.4 (03 Nov 2024 05:07), Max: 98.4 (03 Nov 2024 05:07)  HR: 74 (03 Nov 2024 05:07) (61 - 74)  BP: 130/74 (03 Nov 2024 05:07) (118/69 - 130/79)  BP(mean): --  RR: 18 (03 Nov 2024 05:07) (17 - 18)  SpO2: 95% (03 Nov 2024 05:07) (95% - 97%)    Parameters below as of 03 Nov 2024 05:07  Patient On (Oxygen Delivery Method): room air        T(C): 36.9 (11-03-24 @ 05:07), Max: 37 (11-01-24 @ 13:01)  T(C): 36.9 (11-03-24 @ 05:07), Max: 37 (11-01-24 @ 13:01)  T(C): 36.9 (11-03-24 @ 05:07), Max: 39.4 (10-30-24 @ 12:26)    PHYSICAL EXAM:  HEENT: NC atraumatic  Neck: supple  Respiratory: no accessory muscle use, breathing comfortably  Cardiovascular: distant  Gastrointestinal: normal appearing, nondistended  Extremities: no clubbing, no cyanosis,        LABS:                          10.1   10.33 )-----------( 473      ( 03 Nov 2024 08:00 )             32.5       WBC  10.33 11-03 @ 08:00  9.79 11-01 @ 11:19  9.05 10-31 @ 07:08  12.12 10-30 @ 13:00      11-03    141  |  103  |  8   ----------------------------<  165[H]  3.7   |  28  |  0.79    Ca    8.9      03 Nov 2024 08:00    TPro  6.2  /  Alb  2.2[L]  /  TBili  0.3  /  DBili  x   /  AST  13[L]  /  ALT  13  /  AlkPhos  92  11-03      Creatinine: 0.79 mg/dL (11-03-24 @ 08:00)  Creatinine: 0.84 mg/dL (11-01-24 @ 11:19)  Creatinine: 0.64 mg/dL (10-31-24 @ 07:08)  Creatinine: 0.85 mg/dL (10-30-24 @ 13:00)        Urinalysis Basic - ( 03 Nov 2024 08:00 )    Color: x / Appearance: x / SG: x / pH: x  Gluc: 165 mg/dL / Ketone: x  / Bili: x / Urobili: x   Blood: x / Protein: x / Nitrite: x   Leuk Esterase: x / RBC: x / WBC x   Sq Epi: x / Non Sq Epi: x / Bacteria: x            INFLAMMATORY MARKERS      MICROBIOLOGY:    Culture - Tissue with Gram Stain (10.30.24 @ 11:09)    -  Minocycline: S <=4   -  Tigecycline: S <=2   -  Tobramycin: S <=2   -  Tobramycin: S <=2   -  Trimethoprim/Sulfamethoxazole: S <=0.5/9.5   -  Trimethoprim/Sulfamethoxazole: S <=0.5/9.5   -  Trimethoprim/Sulfamethoxazole: R >2/38   -  Vancomycin: S 2   -  Vancomycin: S 1   -  Levofloxacin: S <=0.5   -  Levofloxacin: R >4   -  Meropenem: S <=1   -  Meropenem: S <=1   -  Oxacillin: R >2   -  Penicillin: R >8   -  Piperacillin/Tazobactam: S <=8 Treatment with Pipercillin/Tazobactam is not recommended in severe infections casued by Klebsiella aerogenes, Enterobacter cloacae complex, and Citrobacter freundii complex.   -  Piperacillin/Tazobactam: R <=8   -  Rifampin: R >2 Should not be used as monotherapy   -  Tetracycline: R >8   Gram Stain:   Rare polymorphonuclear leukocytes seen per low power field  Numerous Gram Negative Rods seen per oil power field  Few Gram positive cocci in pairs seen per oil power field   -  Amoxicillin/Clavulanic Acid: R >16/8   -  Ampicillin: R >16 These ampicillin results predict results for amoxicillin   -  Ampicillin: R >16 These ampicillin results predict results for amoxicillin   -  Ampicillin: S <=2 Predicts results to ampicillin/sulbactam, amoxacillin-clavulanate and  piperacillin-tazobactam.   -  Ampicillin/Sulbactam: R 8/4   -  Ampicillin/Sulbactam: R >16/8   -  Aztreonam: S <=4   -  Aztreonam: R >16   -  Cefazolin: R >16   -  Cefazolin: R >16   -  Cefepime: S <=2   -  Cefepime: R >16   -  Cefotaxime: S <=2 Enterobacter cloacae, Klebsiella aerogenes, and Citrobacter freundii may develop resistance during prolonged therapy.   -  Cefoxitin: R >16   -  Ceftriaxone: S <=1 Enterobacter cloacae, Klebsiella aerogenes, and Citrobacter freundii may develop resistance during prolonged therapy.   -  Ceftriaxone: R >32   -  Ceftazidime: S <=1 Enterobacter cloacae, Klebsiella aerogenes, and Citrobacter freundii may develop resistance during prolonged therapy.   -  Cefuroxime: R <=4   -  Ciprofloxacin: S <=0.25   -  Ciprofloxacin: R >2   -  Clindamycin: R >4   -  Ertapenem: S <=0.5   -  Ertapenem: S <=0.5   -  Erythromycin: R >4   -  Gentamicin: R >8 Should not be used as monotherapy   -  Gentamicin: S <=2   -  Gentamicin: S <=2   -  Imipenem: S <=1   -  Imipenem: S <=1   Specimen Source: Tissue   Culture Results:   Numerous Escherichia coli ESBL  Numerous Citrobacter freundii complex  Few Enterococcus avium  Few Staphylococcus haemolyticus  Moderate Prevotella timonensis "Susceptibilities not performed"   Organism Identification: Citrobacter freundii complex  Escherichia coli ESBL  Staphylococcus haemolyticus  Enterococcus avium   Organism: Citrobacter freundii complex   Organism: Escherichia coli ESBL   Organism: Staphylococcus haemolyticus   Organism: Enterococcus avium   Method Type: BAYLEE   Method Type: BAYLEE   Method Type: BAYLEE   Method Type: BAYLEE        RADIOLOGY & ADDITIONAL STUDIES:

## 2024-11-03 NOTE — PROGRESS NOTE ADULT - ASSESSMENT
54m with dm, cad, pad, dvt atrial fib, hypertension, stroke, indwelling jacques, PE, indwelling jacques, prior diagnosis of Right Heel Osteomyelitis  Culture - Wound Aerobic/Anaerobic (09.05.24 @ 12:55):  Escherichia coli ESBL (-  Piperacillin/Tazobactam: R <=8), Citrobacter freundii, Providencia stuartii sent from wound care with fever, and infected wound to right foot, drained in the office and sent for culture, pt has hx of recurrent infections and severe reaction to ertapenem.    RECOMMENDATIONS  Right heel abscess with surrounding cellulitis  Culture - Wound Aerobic/Anaerobic (09.05.24 @ 12:55):  Escherichia coli ESBL (-  Piperacillin/Tazobactam: R <=8), Citrobacter freundii, Providencia stuartii  Culture - Tissue with Gram Stain (10.30.24 @ 11:09)  Citrobacter freundii complex-(high risk for inducible resistance), Escherichia coli ESBL, Staphylococcus haemolyticus, Enterococcus avium  Enterobacter cloacae complex, Klebsiella aerogenes, and Citrobacter freundii are the most common Enterobacterales at moderate risk for clinically significant inducible AmpC production and challenging antimicrobial Rx with severe carbapenem intolerance   CT-11/3-formal read p[ending  Vancomycin started 10/30-stopped with no evidence of MRSA but of note pt with resistant Staphylococcus haemolyticus  Zosyn started 10/30-continue for now but ultimate recs to follow     Thank you for consulting us and involving us in the management of this most interesting and challenging case.  We will follow along in the care of this patient. Please call us at [phone] or text me directly on my cell# at [phone] with any concerns.

## 2024-11-04 ENCOUNTER — TRANSCRIPTION ENCOUNTER (OUTPATIENT)
Age: 54
End: 2024-11-04

## 2024-11-04 LAB
ANION GAP SERPL CALC-SCNC: 4 MMOL/L — LOW (ref 5–17)
BUN SERPL-MCNC: 9 MG/DL — SIGNIFICANT CHANGE UP (ref 7–23)
CALCIUM SERPL-MCNC: 9.5 MG/DL — SIGNIFICANT CHANGE UP (ref 8.5–10.1)
CHLORIDE SERPL-SCNC: 105 MMOL/L — SIGNIFICANT CHANGE UP (ref 96–108)
CO2 SERPL-SCNC: 30 MMOL/L — SIGNIFICANT CHANGE UP (ref 22–31)
CREAT SERPL-MCNC: 0.87 MG/DL — SIGNIFICANT CHANGE UP (ref 0.5–1.3)
CULTURE RESULTS: SIGNIFICANT CHANGE UP
CULTURE RESULTS: SIGNIFICANT CHANGE UP
EGFR: 101 ML/MIN/1.73M2 — SIGNIFICANT CHANGE UP
GLUCOSE BLDC GLUCOMTR-MCNC: 195 MG/DL — HIGH (ref 70–99)
GLUCOSE BLDC GLUCOMTR-MCNC: 205 MG/DL — HIGH (ref 70–99)
GLUCOSE BLDC GLUCOMTR-MCNC: 221 MG/DL — HIGH (ref 70–99)
GLUCOSE BLDC GLUCOMTR-MCNC: 244 MG/DL — HIGH (ref 70–99)
GLUCOSE SERPL-MCNC: 200 MG/DL — HIGH (ref 70–99)
HCT VFR BLD CALC: 32.1 % — LOW (ref 39–50)
HGB BLD-MCNC: 9.9 G/DL — LOW (ref 13–17)
MAGNESIUM SERPL-MCNC: 2.4 MG/DL — SIGNIFICANT CHANGE UP (ref 1.6–2.6)
MCHC RBC-ENTMCNC: 29 PG — SIGNIFICANT CHANGE UP (ref 27–34)
MCHC RBC-ENTMCNC: 30.8 G/DL — LOW (ref 32–36)
MCV RBC AUTO: 94.1 FL — SIGNIFICANT CHANGE UP (ref 80–100)
NRBC # BLD: 0 /100 WBCS — SIGNIFICANT CHANGE UP (ref 0–0)
PLATELET # BLD AUTO: 424 K/UL — HIGH (ref 150–400)
POTASSIUM SERPL-MCNC: 3.5 MMOL/L — SIGNIFICANT CHANGE UP (ref 3.5–5.3)
POTASSIUM SERPL-SCNC: 3.5 MMOL/L — SIGNIFICANT CHANGE UP (ref 3.5–5.3)
RBC # BLD: 3.41 M/UL — LOW (ref 4.2–5.8)
RBC # FLD: 15.4 % — HIGH (ref 10.3–14.5)
SODIUM SERPL-SCNC: 139 MMOL/L — SIGNIFICANT CHANGE UP (ref 135–145)
SPECIMEN SOURCE: SIGNIFICANT CHANGE UP
SPECIMEN SOURCE: SIGNIFICANT CHANGE UP
WBC # BLD: 8.71 K/UL — SIGNIFICANT CHANGE UP (ref 3.8–10.5)
WBC # FLD AUTO: 8.71 K/UL — SIGNIFICANT CHANGE UP (ref 3.8–10.5)

## 2024-11-04 PROCEDURE — 99232 SBSQ HOSP IP/OBS MODERATE 35: CPT | Mod: 57

## 2024-11-04 PROCEDURE — 99222 1ST HOSP IP/OBS MODERATE 55: CPT

## 2024-11-04 PROCEDURE — 36000 PLACE NEEDLE IN VEIN: CPT | Mod: 59

## 2024-11-04 PROCEDURE — 36573 INSJ PICC RS&I 5 YR+: CPT

## 2024-11-04 PROCEDURE — 76937 US GUIDE VASCULAR ACCESS: CPT | Mod: 26,59

## 2024-11-04 PROCEDURE — 77001 FLUOROGUIDE FOR VEIN DEVICE: CPT | Mod: 26,59

## 2024-11-04 RX ORDER — GABAPENTIN 300 MG/1
300 CAPSULE ORAL THREE TIMES A DAY
Refills: 0 | Status: DISCONTINUED | OUTPATIENT
Start: 2024-11-04 | End: 2024-11-05

## 2024-11-04 RX ORDER — CHLORHEXIDINE GLUCONATE 40 MG/ML
1 SOLUTION TOPICAL
Refills: 0 | Status: DISCONTINUED | OUTPATIENT
Start: 2024-11-04 | End: 2024-11-05

## 2024-11-04 RX ORDER — SODIUM CHLORIDE 9 MG/ML
10 INJECTION, SOLUTION INTRAMUSCULAR; INTRAVENOUS; SUBCUTANEOUS
Refills: 0 | Status: DISCONTINUED | OUTPATIENT
Start: 2024-11-04 | End: 2024-11-05

## 2024-11-04 RX ADMIN — Medication 2 MILLIGRAM(S): at 17:33

## 2024-11-04 RX ADMIN — MIDODRINE HYDROCHLORIDE 15 MILLIGRAM(S): 2.5 TABLET ORAL at 05:07

## 2024-11-04 RX ADMIN — Medication 2 MILLIGRAM(S): at 05:16

## 2024-11-04 RX ADMIN — Medication 81 MILLIGRAM(S): at 12:34

## 2024-11-04 RX ADMIN — Medication 2 MILLIGRAM(S): at 13:10

## 2024-11-04 RX ADMIN — APIXABAN 5 MILLIGRAM(S): 5 TABLET, FILM COATED ORAL at 05:06

## 2024-11-04 RX ADMIN — Medication 500 MILLIGRAM(S): at 12:36

## 2024-11-04 RX ADMIN — MIDODRINE HYDROCHLORIDE 15 MILLIGRAM(S): 2.5 TABLET ORAL at 12:35

## 2024-11-04 RX ADMIN — GABAPENTIN 300 MILLIGRAM(S): 300 CAPSULE ORAL at 21:54

## 2024-11-04 RX ADMIN — PIPERACILLIN AND TAZOBACTAM 25 GRAM(S): .5; 4 INJECTION, POWDER, LYOPHILIZED, FOR SOLUTION INTRAVENOUS at 21:54

## 2024-11-04 RX ADMIN — Medication 40 MILLIGRAM(S): at 21:54

## 2024-11-04 RX ADMIN — Medication 2 MILLIGRAM(S): at 22:24

## 2024-11-04 RX ADMIN — OXYBUTYNIN CHLORIDE 10 MILLIGRAM(S): 5 TABLET ORAL at 21:54

## 2024-11-04 RX ADMIN — Medication 100 MILLIGRAM(S): at 05:06

## 2024-11-04 RX ADMIN — SUCRALFATE 1 GRAM(S): 1 SUSPENSION ORAL at 05:06

## 2024-11-04 RX ADMIN — MIDODRINE HYDROCHLORIDE 15 MILLIGRAM(S): 2.5 TABLET ORAL at 17:33

## 2024-11-04 RX ADMIN — NALOXEGOL OXALATE 25 MILLIGRAM(S): 12.5 TABLET, FILM COATED ORAL at 12:34

## 2024-11-04 RX ADMIN — Medication 1: at 08:36

## 2024-11-04 RX ADMIN — FAMOTIDINE 20 MILLIGRAM(S): 10 INJECTION INTRAVENOUS at 12:35

## 2024-11-04 RX ADMIN — Medication 2 MILLIGRAM(S): at 05:46

## 2024-11-04 RX ADMIN — Medication 2 MILLIGRAM(S): at 21:54

## 2024-11-04 RX ADMIN — PANTOPRAZOLE SODIUM 40 MILLIGRAM(S): 40 TABLET, DELAYED RELEASE ORAL at 05:16

## 2024-11-04 RX ADMIN — Medication 2: at 17:23

## 2024-11-04 RX ADMIN — SUCRALFATE 1 GRAM(S): 1 SUSPENSION ORAL at 17:33

## 2024-11-04 RX ADMIN — Medication 2 MILLIGRAM(S): at 18:10

## 2024-11-04 RX ADMIN — Medication 5 MILLIGRAM(S): at 21:54

## 2024-11-04 RX ADMIN — Medication 2: at 12:25

## 2024-11-04 RX ADMIN — PIPERACILLIN AND TAZOBACTAM 25 GRAM(S): .5; 4 INJECTION, POWDER, LYOPHILIZED, FOR SOLUTION INTRAVENOUS at 13:46

## 2024-11-04 RX ADMIN — Medication 125 MICROGRAM(S): at 05:06

## 2024-11-04 RX ADMIN — PIPERACILLIN AND TAZOBACTAM 25 GRAM(S): .5; 4 INJECTION, POWDER, LYOPHILIZED, FOR SOLUTION INTRAVENOUS at 05:07

## 2024-11-04 RX ADMIN — Medication 2 MILLIGRAM(S): at 12:32

## 2024-11-04 RX ADMIN — GABAPENTIN 100 MILLIGRAM(S): 300 CAPSULE ORAL at 05:07

## 2024-11-04 RX ADMIN — Medication 100 MILLIGRAM(S): at 17:33

## 2024-11-04 RX ADMIN — GABAPENTIN 100 MILLIGRAM(S): 300 CAPSULE ORAL at 13:46

## 2024-11-04 RX ADMIN — Medication 200 MILLIGRAM(S): at 05:06

## 2024-11-04 RX ADMIN — Medication 2 TABLET(S): at 21:54

## 2024-11-04 RX ADMIN — Medication 325 MILLIGRAM(S): at 12:34

## 2024-11-04 RX ADMIN — CETIRIZINE HCL 10 MILLIGRAM(S): 10 TABLET ORAL at 12:33

## 2024-11-04 RX ADMIN — TIOTROPIUM BROMIDE AND OLODATEROL 2 PUFF(S): 3.124; 2.736 SPRAY, METERED RESPIRATORY (INHALATION) at 06:45

## 2024-11-04 NOTE — DISCHARGE NOTE PROVIDER - CARE PROVIDER_API CALL
Grisel Murray  Internal Medicine  [address] [place] [address]  Phone: [phone]  Fax: [phone]  Follow Up Time: 1 week   Grisel Murray  Internal Medicine  [address] [place] [address]  Phone: [phone]  Fax: [phone]  Follow Up Time: 1 week    Tessa Jewell  Podiatric Medicine and Surgery  [address] [place] [address]  Phone: [phone]  Fax: [phone]  Follow Up Time:     Juventino Whitten  Infectious Disease  [address] [place] [id number]  Phone: [phone]  Fax: [phone]  Follow Up Time:

## 2024-11-04 NOTE — PROCEDURE NOTE - ADDITIONAL PROCEDURE DETAILS
49cm bard single lumen power picc inserted into patent right basilic vein under sterile conditions and image guidance.  Tip is in the SVC.  PICC can be accessed.

## 2024-11-04 NOTE — PROGRESS NOTE ADULT - ASSESSMENT
54m with dm, cad, pad, dvt atrial fib, hypertension, stroke, indwelling jacques, PE, indwelling jacques, prior diagnosis of Right Heel Osteomyelitis  Culture - Wound Aerobic/Anaerobic (09.05.24 @ 12:55):  Escherichia coli ESBL (-  Piperacillin/Tazobactam: R <=8), Citrobacter freundii, Providencia stuartii sent from wound care with fever, and infected wound to right foot, drained in the office and sent for culture, pt has hx of recurrent infections and severe reaction to ertapenem.    RECOMMENDATIONS  Right heel abscess with surrounding cellulitis  Culture - Wound Aerobic/Anaerobic (09.05.24 @ 12:55):  Escherichia coli ESBL (-  Piperacillin/Tazobactam: R <=8), Citrobacter freundii, Providencia stuartii  Culture - Tissue with Gram Stain (10.30.24 @ 11:09)  Citrobacter freundii complex-(high risk for inducible resistance), Escherichia coli ESBL, Staphylococcus haemolyticus, Enterococcus avium  Enterobacter cloacae complex, Klebsiella aerogenes, and Citrobacter freundii are the most common Enterobacterales at moderate risk for clinically significant inducible AmpC production and challenging antimicrobial Rx with severe carbapenem intolerance     CT-11/3-active osteomyelitis in the appropriate setting,  A couple of abscesses involve the plantar fascia at the level of the calcaneus and navicular.    Vancomycin started 10/30-stopped with no evidence of MRSA but of note pt with resistant Staphylococcus haemolyticus  Zosyn started 10/30-continue for now with duration based on any planned site control per podiatry    Thank you for consulting us and involving us in the management of this most interesting and challenging case.  We will follow along in the care of this patient. Please call us at [phone] or text me directly on my cell# at [phone] with any concerns.   54m with dm, cad, pad, dvt atrial fib, hypertension, stroke, indwelling jacques, PE, indwelling jacques, prior diagnosis of Right Heel Osteomyelitis  Culture - Wound Aerobic/Anaerobic (09.05.24 @ 12:55):  Escherichia coli ESBL (-  Piperacillin/Tazobactam: R <=8), Citrobacter freundii, Providencia stuartii sent from wound care with fever, and infected wound to right foot, drained in the office and sent for culture, pt has hx of recurrent infections and severe reaction to ertapenem.    RECOMMENDATIONS  Right heel abscess with surrounding cellulitis  Culture - Wound Aerobic/Anaerobic (09.05.24 @ 12:55):  Escherichia coli ESBL (-  Piperacillin/Tazobactam: R <=8), Citrobacter freundii, Providencia stuartii  Culture - Tissue with Gram Stain (10.30.24 @ 11:09)  Citrobacter freundii complex-(high risk for inducible resistance), Escherichia coli ESBL, Staphylococcus haemolyticus, Enterococcus avium  Enterobacter cloacae complex, Klebsiella aerogenes, and Citrobacter freundii are the most common Enterobacterales at moderate risk for clinically significant inducible AmpC production and challenging antimicrobial Rx with severe carbapenem intolerance     CT-11/3-active osteomyelitis in the appropriate setting,  A couple of abscesses involve the plantar fascia at the level of the calcaneus and navicular.    Vancomycin started 10/30-stopped with no evidence of MRSA but of note pt with resistant Staphylococcus haemolyticus  Zosyn started 10/30-continue for now with duration based on any planned site control per podiatry-if no site control then place PICC and 6 weeks abx    Thank you for consulting us and involving us in the management of this most interesting and challenging case.  We will follow along in the care of this patient. Please call us at [phone] or text me directly on my cell# at [phone] with any concerns.

## 2024-11-04 NOTE — DISCHARGE NOTE PROVIDER - NSDCMRMEDTOKEN_GEN_ALL_CORE_FT
acetaminophen 325 mg oral tablet: 2 tab(s) orally every 6 hours As needed Mild Pain (1 - 3)  amiodarone 200 mg oral tablet: 1 tab(s) orally once a day  apixaban 5 mg oral tablet: 1 tab(s) orally 2 times a day  ascorbic acid 1000 mg oral tablet: 1 tab(s) orally once a day  aspirin 81 mg oral tablet, chewable: 1 tab(s) chewed once a day  atorvastatin 40 mg oral tablet: 1 tab(s) orally once a day (at bedtime)  calamine topical lotion: 1 Apply topically to affected area 2 times a day  cholecalciferol 1250 mcg (50,000 intl units) oral capsule: 1 cap(s) orally once a month  cranberry oral capsule: 450 milligram(s) orally once a day  digoxin 125 mcg (0.125 mg) oral tablet: 1 tab(s) orally once a day  docusate sodium 100 mg oral capsule: 3 cap(s) orally once a day (at bedtime)  famotidine 20 mg oral tablet: 1 tab(s) orally once a day (in the morning)  ferrous sulfate 325 mg (65 mg elemental iron) oral tablet: 1 tab(s) orally 2 times a day  fexofenadine 60 mg oral tablet: 1 tab(s) orally once a day  gabapentin 100 mg oral capsule: 1 cap(s) orally every 12 hours  levocetirizine 5 mg oral tablet: 1 tab(s) orally once a day (at bedtime)  melatonin 5 mg oral tablet: 1 tab(s) orally once a day (at bedtime)  metFORMIN 1000 mg oral tablet: 1 tab(s) orally 2 times a day  metoprolol succinate 100 mg oral tablet, extended release: 1 tab(s) orally every 12 hours  midodrine 5 mg oral tablet: 3 tab(s) orally 3 times a day  Multiple Vitamins oral tablet: 1 tab(s) orally once a day  naloxegol 25 mg oral tablet: 1 tab(s) orally once a day (in the morning)  oxyBUTYnin 10 mg/24 hr oral tablet, extended release: 1 tab(s) orally once a day (at bedtime)  oxyCODONE 10 mg oral tablet: 1 tab(s) orally 2 times a day for pain (MDD: 2 tabs)  pantoprazole 40 mg oral delayed release tablet: 1 tab(s) orally once a day  polyethylene glycol 3350 oral kit: 17 gram(s) orally once a day  Regular Insulin Sliding Scale: Regular insulin sliding  scale  senna (sennosides) 8.6 mg oral tablet: 2 tab(s) orally once a day (at bedtime)  sodium chloride 0.65% nasal spray: 1 spray(s) intranasally once a day both nostrils once daily in the morning  sucralfate 1 g oral tablet: 1 tab(s) orally once a day before meals  tiotropium-olodaterol 2.5 mcg-2.5 mcg/inh inhalation aerosol: 2 puff(s) inhaled once a day  traMADol 50 mg oral tablet: 1 tab(s) orally every 4 hours As needed Mild Pain (1 - 3)   acetaminophen 325 mg oral tablet: 2 tab(s) orally every 6 hours As needed Mild Pain (1 - 3)  amiodarone 200 mg oral tablet: 1 tab(s) orally once a day  apixaban 5 mg oral tablet: 1 tab(s) orally 2 times a day  ascorbic acid 1000 mg oral tablet: 1 tab(s) orally once a day  aspirin 81 mg oral tablet, chewable: 1 tab(s) chewed once a day  atorvastatin 40 mg oral tablet: 1 tab(s) orally once a day (at bedtime)  calamine topical lotion: 1 Apply topically to affected area 2 times a day  cholecalciferol 1250 mcg (50,000 intl units) oral capsule: 1 cap(s) orally once a month  cranberry oral capsule: 450 milligram(s) orally once a day  digoxin 125 mcg (0.125 mg) oral tablet: 1 tab(s) orally once a day  docusate sodium 100 mg oral capsule: 3 cap(s) orally once a day (at bedtime)  famotidine 20 mg oral tablet: 1 tab(s) orally once a day (in the morning)  ferrous sulfate 325 mg (65 mg elemental iron) oral tablet: 1 tab(s) orally 2 times a day  fexofenadine 60 mg oral tablet: 1 tab(s) orally once a day  gabapentin 100 mg oral capsule: 1 cap(s) orally every 12 hours  levocetirizine 5 mg oral tablet: 1 tab(s) orally once a day (at bedtime)  melatonin 5 mg oral tablet: 1 tab(s) orally once a day (at bedtime)  metFORMIN 1000 mg oral tablet: 1 tab(s) orally 2 times a day  metoprolol succinate 100 mg oral tablet, extended release: 1 tab(s) orally every 12 hours  midodrine 5 mg oral tablet: 3 tab(s) orally 3 times a day  Multiple Vitamins oral tablet: 1 tab(s) orally once a day  naloxegol 25 mg oral tablet: 1 tab(s) orally once a day (in the morning)  oxyBUTYnin 10 mg/24 hr oral tablet, extended release: 1 tab(s) orally once a day (at bedtime)  oxyCODONE 10 mg oral tablet: 1 tab(s) orally 2 times a day for pain (MDD: 2 tabs)  pantoprazole 40 mg oral delayed release tablet: 1 tab(s) orally once a day  piperacillin-tazobactam 3 g-0.375 g intravenous injection: 3.375 gram(s) intravenous 3 times a day  polyethylene glycol 3350 oral kit: 17 gram(s) orally once a day  Regular Insulin Sliding Scale: Regular insulin sliding  scale  senna (sennosides) 8.6 mg oral tablet: 2 tab(s) orally once a day (at bedtime)  sodium chloride 0.65% nasal spray: 1 spray(s) intranasally once a day both nostrils once daily in the morning  sucralfate 1 g oral tablet: 1 tab(s) orally once a day before meals  tiotropium-olodaterol 2.5 mcg-2.5 mcg/inh inhalation aerosol: 2 puff(s) inhaled once a day  traMADol 50 mg oral tablet: 1 tab(s) orally every 4 hours As needed Mild Pain (1 - 3)

## 2024-11-04 NOTE — CONSULT NOTE ADULT - NS ATTEND AMEND GEN_ALL_CORE FT
53 YO M with past medical history of AFib (on Eliquis), s/p cardiac arrest x 2 (on recent admission), indwelling [place]il[place], cerebral aneurysm, CAD (s/p stents), CVA, T2DM, HTN, OM (s/p debridement), PE, perforated gastric ulcer s/p omentopexy 2019 with Dr. Mejia, sent from wound care with fever, and infected wound to right foot.     - has been doing fairly well from cv standpoint  - did have af with rapid rates in the setting of septic shock/gangrenous foot in 9/2024  - now in rate controlled af  - cont bb, digoxin and amio (was started on prior admission for rate control in the setting of hypotension)  - cont midodrine for orthostatic hypotension  - on ac, and can hold eliquis if plan for OR  - no sign of volume overload. Last echo in 9/2024 with normal LV function, moderate to severe MR.  - known history of cad without sign of acs. Cont asa and statin  - Monitor and replete Lytes. Keep K > 4 and Mg > 2  - no cardiac contraindication to proceeding with debridement.   - will follow with you

## 2024-11-04 NOTE — CONSULT NOTE ADULT - SUBJECTIVE AND OBJECTIVE BOX
[facility] - Brittany Lutz, Reynaldo Sweeney, Román, Francesco Boss  Office Number: [phone]    Initial Consult Note    CHIEF COMPLAINT: Patient is a 54y old  Male who presents with a chief complaint of heel abscess       HPI:  54  year old male past medical history of afib on eliquis  s/p cardiac arrest x 2 w/ AHRF , indwelling Aguilera, cerebral aneurysm, CAD (s/p stents), CVA, T2DM, HTN, OM (s/p debridement), PE, perforated gastric ulcer s/p ometnopexy 2019 with Dr. Mejia  sent from wound care with fever, and infected wound to right foot. Cardiology consulted for pre op risk assessment. Seen at bedside, on room air, denies chest pain dizziness palpitations or shortness of breath, Offers no complaints, states he is here for abscess planning for OR tomorrow 11/4/24.     PAST MEDICAL & SURGICAL HISTORY:  Diabetes      Diabetes mellitus with no complication      Afib      Hypertension      BPH (benign prostatic hyperplasia)      Perforated gastric ulcer  s/p emergent ex-lap omentopexy and plication 6/2019      Pulmonary embolism      History of non-ST elevation myocardial infarction (NSTEMI)      Osteomyelitis  s/p debridement      CAD S/P percutaneous coronary angioplasty      Cerebrovascular accident      H/O abdominal surgery      Perforated gastric ulcer      Traumatic amputation of left foot, initial encounter          SOCIAL HISTORY:  No tobacco, ethanol, or drug abuse.    FAMILY HISTORY:  FH: pulmonary embolism  Mother    FH: coronary artery disease  Father    FH: stroke  Father      No family history of acute MI or sudden cardiac death.    MEDICATIONS  (STANDING):  aMIOdarone    Tablet 200 milliGRAM(s) Oral daily  apixaban 5 milliGRAM(s) Oral every 12 hours  ascorbic acid 500 milliGRAM(s) Oral daily  aspirin  chewable 81 milliGRAM(s) Oral daily  atorvastatin 40 milliGRAM(s) Oral at bedtime  cetirizine 10 milliGRAM(s) Oral daily  dextrose 5%. 1000 milliLiter(s) (50 mL/Hr) IV Continuous <Continuous>  dextrose 5%. 1000 milliLiter(s) (100 mL/Hr) IV Continuous <Continuous>  dextrose 50% Injectable 25 Gram(s) IV Push once  dextrose 50% Injectable 12.5 Gram(s) IV Push once  dextrose 50% Injectable 25 Gram(s) IV Push once  dextrose Oral Gel 15 Gram(s) Oral once  digoxin     Tablet 125 MICROGram(s) Oral daily  famotidine    Tablet 20 milliGRAM(s) Oral daily  ferrous    sulfate 325 milliGRAM(s) Oral daily  gabapentin 300 milliGRAM(s) Oral three times a day  glucagon  Injectable 1 milliGRAM(s) IntraMuscular once  insulin lispro (ADMELOG) corrective regimen sliding scale   SubCutaneous three times a day before meals  insulin lispro (ADMELOG) corrective regimen sliding scale   SubCutaneous at bedtime  melatonin 5 milliGRAM(s) Oral at bedtime  metoprolol succinate  milliGRAM(s) Oral every 12 hours  midodrine. 15 milliGRAM(s) Oral three times a day  naloxegol 25 milliGRAM(s) Oral daily  oxybutynin XL 10 milliGRAM(s) Oral at bedtime  pantoprazole    Tablet 40 milliGRAM(s) Oral before breakfast  piperacillin/tazobactam IVPB.. 3.375 Gram(s) IV Intermittent every 8 hours  polyethylene glycol 3350 17 Gram(s) Oral daily  senna 2 Tablet(s) Oral at bedtime  sucralfate 1 Gram(s) Oral two times a day  tiotropium 2.5 MICROgram(s)/olodaterol 2.5 MICROgram(s) (STIOLTO) Inhaler 2 Puff(s) Inhalation daily    MEDICATIONS  (PRN):  HYDROmorphone   Tablet 2 milliGRAM(s) Oral every 4 hours PRN Severe Pain (7 - 10)  ondansetron Injectable 4 milliGRAM(s) IV Push every 8 hours PRN Nausea and/or Vomiting  oxyCODONE    IR 5 milliGRAM(s) Oral every 4 hours PRN Moderate Pain (4 - 6)  traMADol 50 milliGRAM(s) Oral every 4 hours PRN Mild Pain (1 - 3)      Allergies    fish (Hives)  ertapenem (Blisters; Rash)    Intolerances        REVIEW OF SYSTEMS:  All other review of systems is negative unless indicated above    VITAL SIGNS:   Vital Signs Last 24 Hrs  T(C): 36.8 (04 Nov 2024 13:30), Max: 36.8 (03 Nov 2024 20:42)  T(F): 98.3 (04 Nov 2024 13:30), Max: 98.3 (04 Nov 2024 04:57)  HR: 62 (04 Nov 2024 13:30) (62 - 69)  BP: 154/92 (04 Nov 2024 13:30) (114/73 - 154/92)  BP(mean): --  RR: 15 (04 Nov 2024 13:30) (15 - 18)  SpO2: 97% (04 Nov 2024 13:30) (96% - 100%)    Parameters below as of 04 Nov 2024 13:30  Patient On (Oxygen Delivery Method): room air        I&O's Summary    03 Nov 2024 07:01  -  04 Nov 2024 07:00  --------------------------------------------------------  IN: 350 mL / OUT: 3900 mL / NET: -3550 mL        On Exam:    Constitutional: NAD, alert and oriented x 3  Lungs:  Non-labored, breath sounds are clear bilaterally, No wheezing, rales or rhonchi  Cardiovascular: IRRR.  S1 and S2 positive.  + murmur   Gastrointestinal: Bowel Sounds present, soft, nontender.   Lymph: No peripheral edema. No cervical lymphadenopathy.  Neurological: Alert, no focal deficits  Skin: No rashes or ulcers foot dressing   Psych:  Mood & affect appropriate.    LABS: All Labs Reviewed:                        9.9    8.71  )-----------( 424      ( 04 Nov 2024 07:55 )             32.1                         10.1   10.33 )-----------( 473      ( 03 Nov 2024 08:00 )             32.5     04 Nov 2024 07:55    139    |  105    |  9      ----------------------------<  200    3.5     |  30     |  0.87   03 Nov 2024 08:00    141    |  103    |  8      ----------------------------<  165    3.7     |  28     |  0.79     Ca    9.5        04 Nov 2024 07:55  Ca    8.9        03 Nov 2024 08:00  Mg     2.4       04 Nov 2024 07:55    TPro  6.2    /  Alb  2.2    /  TBili  0.3    /  DBili  x      /  AST  13     /  ALT  13     /  AlkPhos  92     03 Nov 2024 08:00          Blood Culture: Organism --  Gram Stain Blood -- Gram Stain --  Specimen Source Catheterized Catheterized  Culture-Blood --            RADIOLOGY:    EKG:  < from: 12 Lead ECG (10.30.24 @ 13:02) >  Ventricular Rate 78 BPM    QRS Duration 74 ms    Q-T Interval 366 ms    QTC Calculation(Bazett) 417 ms    R Axis -24 degrees    T Axis 135 degrees    Diagnosis Line Atrial fibrillation  Low voltage QRS  Nonspecific T wave abnormality  Abnormal ECG    < end of copied text >    TRANSTHORACIC ECHOCARDIOGRAM REPORT  ________________________________________________________________________________                                      _______       Pt. Name:       SARAH MORRISON Study Date:    9/5/2024  MRN:            [id number]         YOB: 1970  Accession #:    [id number]        Age:           54 years  Account#:       [id number]       Gender:        M  Heart Rate:                      Height:        74.02 in (188.00 cm)  Rhythm:                          Weight:        207.23 lb (94.00 kg)  Blood Pressure: 97/60 mmHg       BSA/BMI:       2.21 m² / 26.60 kg/m²  ________________________________________________________________________________________  Referring Physician:    [id number] Hill Brizuela  Interpreting Physician: Claudette Jacobs  Primary Sonographer:    Mounika FELDMAN    CPT:               ECHO TTE WO CON COMP W DOPP - 92525.m  Indication(s):     Abnormal electrocardiogram ECG/EKG - R94.31  Procedure:         Transthoracic echocardiogram with2-D, M-mode and complete                     spectral and color flow Doppler.  Ordering Location: [facility]  Admission Status:  Inpatient  Study Information: Image quality for this study is technically difficult.    _______________________________________________________________________________________     CONCLUSIONS:      1. Technically difficult image quality.   2. Left ventricular cavity is normal in size. Left ventricular wall thickness is normal. Left ventricular systolic function is normal withan ejection fraction of 69 % by Castañeda's method of disks. There are no regional wall motion abnormalities seen.   3. There is increased LV mass and concentric hypertrophy.   4. Normal right ventricular cavity size and normal right ventricular systolic function.   5. Left atrium is severely dilated.   6. The right atrium is severely dilated.   7. There is calcification of the mitral valve annulus.   8. Mitral valve leaflets are diffusely calcified.   9. Moderate to severe mitral regurgitation.  10. Trileaflet aortic valve with normal systolic excursion. There is calcification of the aortic valve leaflets.  11. Mild tricuspid regurgitation.  12. Estimated pulmonary artery systolic pressure is 39 mmHg, consistent with borderline pulmonary hypertension.  13. The inferior vena cava is normal in size measuring 1.75 cm in diameter, (normal <2.1cm) with normal inspiratory collapse (normal >50%) consistent with normal right atrial pressure (~3, range 0-5mmHg).    ________________________________________________________________________________________  FINDINGS:     Left Ventricle:  The left ventricular cavity is normal in size. Left ventricular wall thickness is normal. Left ventricular systolic function is normal with a calculated ejection fraction of 69 % by the Castañeda's biplane method of disks. There are no regional wall motion abnormalities seen. There is increased LV mass and concentric hypertrophy. The left ventricular diastolic function is indeterminate.     Right Ventricle:  The right ventricular cavity is normal in size and right ventricular systolic function is normal.     Left Atrium:  The left atrium is severely dilated with an indexed volume of 31.95 ml/m².     Right Atrium:  The right atrium is severely dilated with an indexed volume of 26.74 ml/m².     Aortic Valve:  The aortic valve appears trileaflet with normal systolic excursion. There is calcification of the aortic valve leaflets.     Mitral Valve:  There is calcification of the mitral valve annulus. Mitral valve leaflets are diffusely calcified. There is moderate to severe mitral regurgitation.     Tricuspid Valve:  Structurally normal tricuspid valve with normal leaflet excursion. There is mild tricuspid regurgitation. Estimated pulmonary artery systolicpressure is 39 mmHg, consistent with borderline pulmonary hypertension.     Pulmonic Valve:  The pulmonic valve was not well visualized. There is trace pulmonic regurgitation.     Systemic Veins:  The inferior vena cava is normal in size measuring 1.75 cm in diameter, (normal <2.1cm) with normal inspiratory collapse (normal >50%) consistent with normal right atrial pressure (~3, range 0-5mmHg).  ____________________________________________________________________  QUANTITATIVE DATA:  Left Ventricle Measurements: (Indexed to BSA)     IVSd (2D):   1.4 cm  LVPWd (2D):  1.3 cm  LVIDd (2D):  5.4 cm  LVIDs (2D):  4.0 cm  LV Mass:     306 g  138.7 g/m²  LV Vol d, MOD A2C: 87.4 ml 39.62 ml/m²  LV Vol d, MOD A4C: 81.2 ml 36.78 ml/m²  LV Vol d, MOD BP: 92.3 ml 41.82 ml/m²  LV Vol s, MOD A2C: 29.0 ml 13.13 ml/m²  LV Vol s, MOD A4C: 28.3 ml 12.82 ml/m²  LV Vol s, MOD BP:  28.9 ml 13.08 ml/m²  LVOT SV MOD BP:    63.4 ml  LV EF% MOD BP:     69 %     MV E Vmax:    1.19 m/s  e' lateral:   13.10 cm/s  e'medial:    10.70 cm/s  E/e' lateral: 9.05  E/e' medial:  12.00  E/e' Average: 9.96  MV DT:        148 msec    Aorta Measurements: (Normal range) (Indexed to BSA)     Ao Root d 3.23 cm (3.1 - 3.7 cm) 1.46 cm/m²            Left Atrium Measurements: (Indexed to BSA)  LA Diam 2D: 4.72 cm         Right Atrial Measurements:     RA Vol:       59.00 ml  RA Vol Index: 26.74 ml/m²       LVOT / RVOT/ Qp/Qs Data: (Indexed to BSA)  LVOT Diameter: 2.29 cm  LVOT Area:     4.12 cm²    Mitral Valve Measurements:     MV E Vmax: 1.2 m/s       Tricuspid Valve Measurements:     TR Vmax:          3.0 m/s  TR Peak Gradient: 35.9 mmHg  RA Pressure:      3 mmHg  PASP:             39 mmHg    ________________________________________________________________________________________  Electronically signed on 9/6/2024 at 10:38:59 AM by Claudette Jacobs         *** Final ***          
[facility] of INFECTIOUS DISEASE  Juventino Whitten MD PhD, Symone Hickey MD, Anne-Marie Li MD, Jeffery Jacobs MD, Ryan Romeo MD  and providing coverage with Melody Armstrong MD  Providing Infectious Disease Consultations at [facility], [facility], [place], [place], [facility], [facility]'s    Office# [phone] to schedule follow up appointments  Answering Service for urgent calls or New Consults [phone]  Cell# to text for urgent issues Juventino Whitten [phone]     HPI:  54m with dm, cad, pad, dvt atrial fib, hypertension, stroke, indwelling jacques, PE, indwelling jacques, prior diagnosis of Right Heel Osteomyelitis  Culture - Wound Aerobic/Anaerobic (09.05.24 @ 12:55):  Escherichia coli ESBL ( -  Piperacillin/Tazobactam: R <=8), Citrobacter freundii, Providencia stuartii sent from wound care with fever, and infected wound to right foot, drained in the office and sent for culture, pt has hx of recurrent infections and severe reaction to ertapenem.      PAST MEDICAL & SURGICAL HISTORY:    Diabetes mellitus with no complication      Afib      Hypertension      BPH (benign prostatic hyperplasia)      Perforated gastric ulcer  s/p emergent ex-lap omentopexy and plication 6/2019      Pulmonary embolism      History of non-ST elevation myocardial infarction (NSTEMI)      Osteomyelitis  s/p debridement      CAD S/P percutaneous coronary angioplasty      Cerebrovascular accident      H/O abdominal surgery      Perforated gastric ulcer      Traumatic amputation of left foot, initial encounter          Antimicrobials  piperacillin/tazobactam IVPB.. 3.375 Gram(s) IV Intermittent every 8 hours  vancomycin  IVPB 1250 milliGRAM(s) IV Intermittent every 12 hours      Immunological      Other  acetaminophen     Tablet .. 650 milliGRAM(s) Oral every 6 hours PRN  aMIOdarone    Tablet 200 milliGRAM(s) Oral daily  apixaban 5 milliGRAM(s) Oral every 12 hours  ascorbic acid 500 milliGRAM(s) Oral daily  aspirin  chewable 81 milliGRAM(s) Oral daily  atorvastatin 40 milliGRAM(s) Oral at bedtime  cetirizine 10 milliGRAM(s) Oral daily  dextrose 5%. 1000 milliLiter(s) IV Continuous <Continuous>  dextrose 5%. 1000 milliLiter(s) IV Continuous <Continuous>  dextrose 50% Injectable 25 Gram(s) IV Push once  dextrose 50% Injectable 12.5 Gram(s) IV Push once  dextrose 50% Injectable 25 Gram(s) IV Push once  dextrose Oral Gel 15 Gram(s) Oral once  digoxin     Tablet 125 MICROGram(s) Oral daily  famotidine    Tablet 20 milliGRAM(s) Oral daily  ferrous    sulfate 325 milliGRAM(s) Oral daily  gabapentin 100 milliGRAM(s) Oral every 12 hours  glucagon  Injectable 1 milliGRAM(s) IntraMuscular once  HYDROmorphone  Injectable 1 milliGRAM(s) IV Push every 4 hours PRN  insulin lispro (ADMELOG) corrective regimen sliding scale   SubCutaneous three times a day before meals  insulin lispro (ADMELOG) corrective regimen sliding scale   SubCutaneous at bedtime  melatonin 5 milliGRAM(s) Oral at bedtime  metoprolol succinate  milliGRAM(s) Oral every 12 hours  midodrine. 15 milliGRAM(s) Oral three times a day  naloxegol 25 milliGRAM(s) Oral daily  oxybutynin XL 10 milliGRAM(s) Oral at bedtime  oxycodone    5 mG/acetaminophen 325 mG 2 Tablet(s) Oral every 4 hours PRN  pantoprazole    Tablet 40 milliGRAM(s) Oral before breakfast  polyethylene glycol 3350 17 Gram(s) Oral daily  senna 2 Tablet(s) Oral at bedtime  sodium chloride 0.9%. 1000 milliLiter(s) IV Continuous <Continuous>  sucralfate 1 Gram(s) Oral two times a day  tiotropium 2.5 MICROgram(s)/olodaterol 2.5 MICROgram(s) (STIOLTO) Inhaler 2 Puff(s) Inhalation daily  traMADol 50 milliGRAM(s) Oral every 4 hours PRN      Allergies    fish (Hives)  ertapenem (Blisters; Rash)    Intolerances        SOCIAL HISTORY:  no toxic habits reported      FAMILY HISTORY:  FH: pulmonary embolism  Mother    FH: coronary artery disease  Father    FH: stroke  Father        ROS:    EYES:  Negative  blurry vision or double vision  GASTROINTESTINAL:  Negative for nausea, vomiting, diarrhea  -otherwise negative except for subjective    Vital Signs Last 24 Hrs  T(C): 36.6 (31 Oct 2024 04:48), Max: 39.4 (30 Oct 2024 12:26)  T(F): 97.8 (31 Oct 2024 04:48), Max: 102.9 (30 Oct 2024 12:26)  HR: 69 (31 Oct 2024 04:48) (69 - 81)  BP: 102/61 (31 Oct 2024 04:48) (102/61 - 134/86)  BP(mean): --  RR: 18 (31 Oct 2024 04:48) (18 - 20)  SpO2: 95% (31 Oct 2024 04:48) (93% - 98%)    Parameters below as of 31 Oct 2024 04:48  Patient On (Oxygen Delivery Method): room air        PE:  In no distress  HEENT:  NC, PERRL, sclerae anicteric, conjunctivae clear, EOMI.  Sinuses nontender, no nasal exudate.  No buccal or pharyngeal lesions, erythema or exudate  Neck:  Supple, no adenopathy  Lungs:  No accessory muscle use, bilaterally clear to auscultation  Cor:  distant  Abd:  Symmetric, normoactive BS.  Soft, nontender, no masses, guarding or rebound.  Liver and spleen not enlarged  Extrem:  No cyanosis or edema, right heel wrapped with malodorous drainage  Neuro: grossly intact  Musc: moving all limbs freely, no focal deficits        LABS:                        9.2    9.05  )-----------( 430      ( 31 Oct 2024 07:08 )             29.9       WBC Count: 9.05 K/uL (10-31-24 @ 07:08)  WBC Count: 12.12 K/uL (10-30-24 @ 13:00)      10-31    139  |  106  |  12  ----------------------------<  125[H]  3.8   |  28  |  0.64    Ca    8.7      31 Oct 2024 07:08    TPro  7.0  /  Alb  2.5[L]  /  TBili  0.4  /  DBili  x   /  AST  14[L]  /  ALT  15  /  AlkPhos  127[H]  10-30      Creatinine: 0.64 mg/dL (10-31-24 @ 07:08)  Creatinine: 0.85 mg/dL (10-30-24 @ 13:00)      MICROBIOLOGY:    Culture - Tissue with Gram Stain (10.30.24 @ 11:09)    Gram Stain:   Rare polymorphonuclear leukocytes seen per low power field  Numerous Gram Negative Rods seen per oil power field  Few Gram positive cocci in pairs seen per oil power field   Specimen Source: Tissue    Culture - Tissue with Gram Stain (09.10.24 @ 12:30)    -  Meropenem: S <=1   -  Oxacillin: R >2   -  Penicillin: R >8   -  Piperacillin/Tazobactam: R <=8   -  Rifampin: S <=1 Should not be used as monotherapy   -  Tetracycline: R >8   -  Tobramycin: S <=2   -  Trimethoprim/Sulfamethoxazole: S <=0.5/9.5   -  Trimethoprim/Sulfamethoxazole: S <=0.5/9.5   -  Vancomycin: S 1   Gram Stain:   No polymorphonuclear leukocytes seen per low power field  Numerous Gram Variable Rods seen per oil power field  Moderate Gram positive cocci in pairs seen per oil power field   -  Ampicillin: R >16 These ampicillin results predict results for amoxicillin   -  Aztreonam: R >16   -  Ampicillin/Sulbactam: R 8/4   -  Cefazolin: R >16   -  Cefepime: R >16   -  Ceftriaxone: R >32   -  Ciprofloxacin: R >2   -  Clindamycin: S <=0.25   -  Daptomycin: S <=0.25   -  Ertapenem: S <=0.5   -  Erythromycin: S <=0.25   -  Gentamicin: S <=2   -  Gentamicin: R >8 Should not be used as monotherapy   -  Imipenem: S <=1   -  Levofloxacin: R >4   -  Linezolid: S 2   Specimen Source: Tissue Other, RIGHT FOOT SOFT TISSUE CULTURE   Culture Results:   Culture yields >4 types of aerobic and/or anaerobic bacteria  Call client services within 7 days if further workup is clinically  indicated.  Culture includes  Moderate Methicillin Resistant Staphylococcus aureus  Numerous Escherichia coli ESBL   Organism Identification: Escherichia coli ESBL  Methicillin resistant Staphylococcus aureus   Organism: Escherichia coli ESBL   Organism: Methicillin resistant Staphylococcus aureus   Method Type: BAYLEE   Method Type: BAYLEE        RADIOLOGY & ADDITIONAL STUDIES:    --< from: Xray Chest 1 View-PORTABLE IMMEDIATE (10.30.24 @ 12:44) >    ACC: [id number] EXAM:  XR CHEST PORTABLE IMMED 1V   ORDERED BY: TE HILL     PROCEDURE DATE:  10/30/2024          INTERPRETATION:  An AP portable semiupright chest radiograph was   performed for cough, shortness of breath and fever.    Comparison is made to 9/5/2024.    The lungs are clear bilaterally. There are no infiltrates on either side.   There is no pneumothorax. There is no pleural fluid. There is focal left   lateral pleural thickening adjacent to the left eighth lateral rib.There   is no hilar or mediastinal widening. The cardiac silhouette is likely   magnified by technique. There is no CHF. The bony thorax is notable for   degenerative changes of the thoracic spine.    IMPRESSION:  1. Clear lungs with no acute cardiopulmonary abnormalities.  2. Nonspecific localized left pleural thickening at the level of the left   eighth lateral rib, not seen on the prior exam.    --- End of Report ---            SID GREENE MD; Attending Radiologist  This document has been electronically signed. Oct 30 2024  3:10PM    < end of copied text >  
HPI:      54y year old Male seen at [facility] ED for right heel wound with abscess. Patient was seen at the wound care center today and was noted with new abscess to the distal heel wound. Patient has had partial calcanectomy to the right heel on the previous admission. Patient also was being seen on  a weekly basis at the wound care center. Patient recently finished PO abx Bactrim as of last week. Patient relates mild tenderness and pain to the right midfoot in the area of abscess.   Denies any fever, chills, nausea, vomiting, chest pain, shortness of breath, or calf pain at this time.    REVIEW OF SYSTEMS    PAST MEDICAL & SURGICAL HISTORY:  Diabetes      Diabetes mellitus with no complication      Afib      Hypertension      BPH (benign prostatic hyperplasia)      Perforated gastric ulcer  s/p emergent ex-lap omentopexy and plication 6/2019      Pulmonary embolism      History of non-ST elevation myocardial infarction (NSTEMI)      Osteomyelitis  s/p debridement      CAD S/P percutaneous coronary angioplasty      Cerebrovascular accident      H/O abdominal surgery      Perforated gastric ulcer      Traumatic amputation of left foot, initial encounter          Allergies    fish (Hives)  ertapenem (Blisters; Rash)    Intolerances        MEDICATIONS  (STANDING):  sodium chloride 0.9% Bolus 1000 milliLiter(s) IV Bolus once  vancomycin  IVPB 1500 milliGRAM(s) IV Intermittent Once    MEDICATIONS  (PRN):      Social History:      FAMILY HISTORY:  FH: pulmonary embolism  Mother    FH: coronary artery disease  Father    FH: stroke  Father        Vital Signs Last 24 Hrs  T(C): 39.4 (30 Oct 2024 12:26), Max: 39.4 (30 Oct 2024 12:26)  T(F): 102.9 (30 Oct 2024 12:26), Max: 102.9 (30 Oct 2024 12:26)  HR: 79 (30 Oct 2024 12:26) (79 - 79)  BP: 134/86 (30 Oct 2024 12:26) (134/86 - 134/86)  BP(mean): --  RR: 20 (30 Oct 2024 12:26) (20 - 20)  SpO2: 98% (30 Oct 2024 12:26) (98% - 98%)    Parameters below as of 30 Oct 2024 12:26  Patient On (Oxygen Delivery Method): room air        PHYSICAL EXAM:  Vascular: DP & PT non palpable bilaterally, Capillary refill 3 seconds, Digital hair absent bilaterally  Neurological: Light touch sensation diminished bilaterally  Musculoskeletal: 3/5 strength in all quadrants to the right and 4/5 to the elft , s/p partial calcanectomy of the right heel   Dermatological:  Right plantar midfoot 1.5cm x 0.7cm x 1.0cm midfoot s/p Incision and drainage of the abscess at the wound care center and  with periwound erythema and edema, with 1.0 cc of seropurulent drainage   14.0 cm x 7.6cm x 0.3 cm  heel wound noted with  granular wound bed, no probe to bone, no periwound erythema, no fluctuance, no malodor, no proximal streaking at this time    CBC Full  -  ( 30 Oct 2024 13:00 )  WBC Count : 12.12 K/uL  RBC Count : 3.49 M/uL  Hemoglobin : 10.4 g/dL  Hematocrit : 33.1 %  Platelet Count - Automated : 457 K/uL  Mean Cell Volume : 94.8 fl  Mean Cell Hemoglobin : 29.8 pg  Mean Cell Hemoglobin Concentration : 31.4 g/dL  Auto Neutrophil # : 8.63 K/uL  Auto Lymphocyte # : 1.43 K/uL  Auto Monocyte # : 1.29 K/uL  Auto Eosinophil # : 0.63 K/uL  Auto Basophil # : 0.05 K/uL  Auto Neutrophil % : 71.3 %  Auto Lymphocyte % : 11.8 %  Auto Monocyte % : 10.6 %  Auto Eosinophil % : 5.2 %  Auto Basophil % : 0.4 %    10-30    139  |  102  |  14  ----------------------------<  106[H]  4.3   |  28  |  0.85    Ca    8.9      30 Oct 2024 13:00    TPro  7.0  /  Alb  2.5[L]  /  TBili  0.4  /  DBili  x   /  AST  14[L]  /  ALT  15  /  AlkPhos  127[H]  10-30                          10.4   12.12 )-----------( 457      ( 30 Oct 2024 13:00 )             33.1         Imaging: ----------

## 2024-11-04 NOTE — DISCHARGE NOTE PROVIDER - NSDCCPCAREPLAN_GEN_ALL_CORE_FT
PRINCIPAL DISCHARGE DIAGNOSIS  Diagnosis: Diabetic ulcer of right foot  Assessment and Plan of Treatment:       SECONDARY DISCHARGE DIAGNOSES  Diagnosis: Abscess of right foot  Assessment and Plan of Treatment:     Diagnosis: Fever  Assessment and Plan of Treatment:      PRINCIPAL DISCHARGE DIAGNOSIS  Diagnosis: Diabetic ulcer of right foot  Assessment and Plan of Treatment: follow up with podiatry Dr. rand      SECONDARY DISCHARGE DIAGNOSES  Diagnosis: Fever  Assessment and Plan of Treatment:     Diagnosis: Abscess of right foot  Assessment and Plan of Treatment:

## 2024-11-04 NOTE — PROGRESS NOTE ADULT - SUBJECTIVE AND OBJECTIVE BOX
54y year old Male seen at [facility] 1EAS 112 W1 for plantar right heel ulcer and midfoot abscess, s/p incision and drainage at the wound care center.     Denies any fever, chills, nausea, vomiting, chest pain, shortness of breath, or calf pain at this time.    Allergies    fish (Hives)  ertapenem (Blisters; Rash)    Intolerances        MEDICATIONS  (STANDING):  aMIOdarone    Tablet 200 milliGRAM(s) Oral daily  ascorbic acid 500 milliGRAM(s) Oral daily  aspirin  chewable 81 milliGRAM(s) Oral daily  atorvastatin 40 milliGRAM(s) Oral at bedtime  cetirizine 10 milliGRAM(s) Oral daily  dextrose 5%. 1000 milliLiter(s) (100 mL/Hr) IV Continuous <Continuous>  dextrose 5%. 1000 milliLiter(s) (50 mL/Hr) IV Continuous <Continuous>  dextrose 50% Injectable 25 Gram(s) IV Push once  dextrose 50% Injectable 12.5 Gram(s) IV Push once  dextrose 50% Injectable 25 Gram(s) IV Push once  dextrose Oral Gel 15 Gram(s) Oral once  digoxin     Tablet 125 MICROGram(s) Oral daily  famotidine    Tablet 20 milliGRAM(s) Oral daily  ferrous    sulfate 325 milliGRAM(s) Oral daily  gabapentin 300 milliGRAM(s) Oral three times a day  glucagon  Injectable 1 milliGRAM(s) IntraMuscular once  insulin lispro (ADMELOG) corrective regimen sliding scale   SubCutaneous three times a day before meals  insulin lispro (ADMELOG) corrective regimen sliding scale   SubCutaneous at bedtime  melatonin 5 milliGRAM(s) Oral at bedtime  metoprolol succinate  milliGRAM(s) Oral every 12 hours  midodrine. 15 milliGRAM(s) Oral three times a day  naloxegol 25 milliGRAM(s) Oral daily  oxybutynin XL 10 milliGRAM(s) Oral at bedtime  pantoprazole    Tablet 40 milliGRAM(s) Oral before breakfast  piperacillin/tazobactam IVPB.. 3.375 Gram(s) IV Intermittent every 8 hours  polyethylene glycol 3350 17 Gram(s) Oral daily  senna 2 Tablet(s) Oral at bedtime  sucralfate 1 Gram(s) Oral two times a day  tiotropium 2.5 MICROgram(s)/olodaterol 2.5 MICROgram(s) (STIOLTO) Inhaler 2 Puff(s) Inhalation daily    MEDICATIONS  (PRN):  HYDROmorphone   Tablet 2 milliGRAM(s) Oral every 4 hours PRN Severe Pain (7 - 10)  ondansetron Injectable 4 milliGRAM(s) IV Push every 8 hours PRN Nausea and/or Vomiting  oxyCODONE    IR 5 milliGRAM(s) Oral every 4 hours PRN Moderate Pain (4 - 6)  traMADol 50 milliGRAM(s) Oral every 4 hours PRN Mild Pain (1 - 3)      Vital Signs Last 24 Hrs  T(C): 36.8 (04 Nov 2024 13:30), Max: 36.8 (03 Nov 2024 20:42)  T(F): 98.3 (04 Nov 2024 13:30), Max: 98.3 (04 Nov 2024 04:57)  HR: 62 (04 Nov 2024 13:30) (62 - 69)  BP: 154/92 (04 Nov 2024 13:30) (114/73 - 154/92)  BP(mean): --  RR: 15 (04 Nov 2024 13:30) (15 - 18)  SpO2: 97% (04 Nov 2024 13:30) (96% - 100%)    Parameters below as of 04 Nov 2024 13:30  Patient On (Oxygen Delivery Method): room air      PHYSICAL EXAM:  Vascular: DP & PT non palpable bilaterally, Capillary refill 3 seconds, Digital hair absent bilaterally  Neurological: Light touch sensation diminished bilaterally  Musculoskeletal: 3/5 strength in all quadrants to the right and 4/5 to the left , s/p partial calcanectomy of the right heel   Dermatological:  Right plantar midfoot 1.5cm x 0.7cm x 1.0cm midfoot s/p Incision and drainage of the abscess at the wound care center, no more edema and erythema noted, no more purulence noted   14.0 cm x 7.6cm x 0.3 cm  heel wound noted with  granular wound bed, no probe to bone, no periwound erythema, no fluctuance, no malodor, no proximal streaking at this time        CBC Full  -  ( 04 Nov 2024 07:55 )  WBC Count : 8.71 K/uL  RBC Count : 3.41 M/uL  Hemoglobin : 9.9 g/dL  Hematocrit : 32.1 %  Platelet Count - Automated : 424 K/uL  Mean Cell Volume : 94.1 fl  Mean Cell Hemoglobin : 29.0 pg  Mean Cell Hemoglobin Concentration : 30.8 g/dL  Auto Neutrophil # : x  Auto Lymphocyte # : x  Auto Monocyte # : x  Auto Eosinophil # : x  Auto Basophil # : x  Auto Neutrophil % : x  Auto Lymphocyte % : x  Auto Monocyte % : x  Auto Eosinophil % : x  Auto Basophil % : x      11-04    139  |  105  |  9   ----------------------------<  200[H]  3.5   |  30  |  0.87    Ca    9.5      04 Nov 2024 07:55  Mg     2.4     11-04    TPro  6.2  /  Alb  2.2[L]  /  TBili  0.3  /  DBili  x   /  AST  13[L]  /  ALT  13  /  AlkPhos  92  11-03                          9.9    8.71  )-----------( 424      ( 04 Nov 2024 07:55 )             32.1         Imaging: ----------    ACC: [id number] EXAM: CT 3D RECONSTRUCT [facility] ORDERED BY: HANNAH REDDY    ACC: [id number] EXAM: CT FOOT ONLY IC RT ORDERED BY: HANNAH REDDY    PROCEDURE DATE: 11/03/2024        INTERPRETATION: CT FOOT WITH IV CONTRAST RIGHT, CT 3D RECONSTRUCTION WO WORKSTATION    HISTORY: Concern for right heel abscess. Type II diabetes.    TECHNIQUE: Contiguous axial imaging was performed through the right foot without contrast. Coronal and sagittal reformatting was utilized. 3-D reformatting was performed.    COMPARISON: Frontal x-rays dated 10/30/2024.    FINDINGS:    OSSEOUS STRUCTURES   Fractures: None.   Postoperative Changes: Resection the calcaneal tuberosity is again seen with sclerosis and slight irregularity of the amputated margin. Mild periosteal reaction is also noted.   Marrow: The patient appears osteopenic.    JOINTS   Tibiotalar Joint: Maintained although tiny osteophytes are present.   Subtalar Joints: Maintained.   Intertarsal Joints: Maintained. Hypertrophic talar ridge is noted.   Tarsometatarsal Joints: Maintained.   Metatarsophalangeal Joints: Maintained.   Interphalangeal Joints: Maintained.    MYOTENDINOUS STRUCTURES  There are postsurgical changes from the calcaneal tuberosity resection.  Severe generalized muscle atrophy and mild edematous change suggests diabetic neuropathy.  Fluid collection involves the central cord of the plantar fascia at the level of the navicular measuring approximately 2.8 x 2.2 x 1.0 cm (AP x TR x CC) with a few gas locules and plantar adjacent wound. Fluid can collection involves the lateral cord of the plantar fascia at the level of the calcaneus measuring approximately 1.7 x 1.9 x 0.7 cm (AP x TR x CC).    OTHER SOFT TISSUES  There is soft tissue induration at the site of the calcaneal resection. Moderate to severe edema is present. No tracking gas is seen.    IMPRESSION:  1. Prior resection of the calcaneal tuberosity is again seen with changes of the resected margin that could reflect active osteomyelitis in the appropriate setting.  2. A couple of abscesses involve the plantar fascia at the level of the calcaneus and navicular.    --- End of Report ---

## 2024-11-04 NOTE — PROGRESS NOTE ADULT - SUBJECTIVE AND OBJECTIVE BOX
[facility] DIVISION of INFECTIOUS DISEASE  Juventino Whitten MD PhD, Symone Hickey MD, Anne-Marie Li MD, Jeffery Jacobs MD, Ryan Romeo MD  and providing coverage with Melody Armstrong MD  Providing Infectious Disease Consultations at [facility], [facility], [facility]'s    Office# [phone] to schedule follow up appointments  Answering Service for urgent calls or New Consults [phone]  Cell# to text for urgent issues Juventino Whitten [phone]     infectious diseases progress note:    SARAH MORRISON is a 54y y. o. Male patient    Overnight and events of the last 24hrs reviewed    Allergies    fish (Hives)  ertapenem (Blisters; Rash)    Intolerances        ANTIBIOTICS/RELEVANT:  antimicrobials  piperacillin/tazobactam IVPB.. 3.375 Gram(s) IV Intermittent every 8 hours    immunologic:    OTHER:  aMIOdarone    Tablet 200 milliGRAM(s) Oral daily  apixaban 5 milliGRAM(s) Oral every 12 hours  ascorbic acid 500 milliGRAM(s) Oral daily  aspirin  chewable 81 milliGRAM(s) Oral daily  atorvastatin 40 milliGRAM(s) Oral at bedtime  cetirizine 10 milliGRAM(s) Oral daily  dextrose 5%. 1000 milliLiter(s) IV Continuous <Continuous>  dextrose 5%. 1000 milliLiter(s) IV Continuous <Continuous>  dextrose 50% Injectable 25 Gram(s) IV Push once  dextrose 50% Injectable 12.5 Gram(s) IV Push once  dextrose 50% Injectable 25 Gram(s) IV Push once  dextrose Oral Gel 15 Gram(s) Oral once  digoxin     Tablet 125 MICROGram(s) Oral daily  famotidine    Tablet 20 milliGRAM(s) Oral daily  ferrous    sulfate 325 milliGRAM(s) Oral daily  gabapentin 100 milliGRAM(s) Oral three times a day  glucagon  Injectable 1 milliGRAM(s) IntraMuscular once  HYDROmorphone   Tablet 2 milliGRAM(s) Oral every 4 hours PRN  insulin lispro (ADMELOG) corrective regimen sliding scale   SubCutaneous three times a day before meals  insulin lispro (ADMELOG) corrective regimen sliding scale   SubCutaneous at bedtime  melatonin 5 milliGRAM(s) Oral at bedtime  metoprolol succinate  milliGRAM(s) Oral every 12 hours  midodrine. 15 milliGRAM(s) Oral three times a day  naloxegol 25 milliGRAM(s) Oral daily  ondansetron Injectable 4 milliGRAM(s) IV Push every 8 hours PRN  oxybutynin XL 10 milliGRAM(s) Oral at bedtime  oxyCODONE    IR 5 milliGRAM(s) Oral every 4 hours PRN  pantoprazole    Tablet 40 milliGRAM(s) Oral before breakfast  polyethylene glycol 3350 17 Gram(s) Oral daily  senna 2 Tablet(s) Oral at bedtime  sodium chloride 0.9%. 1000 milliLiter(s) IV Continuous <Continuous>  sucralfate 1 Gram(s) Oral two times a day  tiotropium 2.5 MICROgram(s)/olodaterol 2.5 MICROgram(s) (STIOLTO) Inhaler 2 Puff(s) Inhalation daily  traMADol 50 milliGRAM(s) Oral every 4 hours PRN      Objective:  Vital Signs Last 24 Hrs  T(C): 36.8 (04 Nov 2024 04:57), Max: 36.8 (03 Nov 2024 11:47)  T(F): 98.3 (04 Nov 2024 04:57), Max: 98.3 (03 Nov 2024 11:47)  HR: 65 (04 Nov 2024 04:57) (64 - 69)  BP: 114/73 (04 Nov 2024 04:57) (114/73 - 158/84)  BP(mean): --  RR: 18 (04 Nov 2024 04:57) (17 - 18)  SpO2: 96% (04 Nov 2024 04:57) (96% - 100%)    Parameters below as of 04 Nov 2024 04:57  Patient On (Oxygen Delivery Method): room air        T(C): 36.8 (11-04-24 @ 04:57), Max: 36.9 (11-03-24 @ 05:07)  T(C): 36.8 (11-04-24 @ 04:57), Max: 37 (11-01-24 @ 13:01)  T(C): 36.8 (11-04-24 @ 04:57), Max: 37 (11-01-24 @ 13:01)    PHYSICAL EXAM:  HEENT: NC atraumatic  Neck: supple  Respiratory: no accessory muscle use, breathing comfortably  Cardiovascular: distant  Gastrointestinal: normal appearing, nondistended  Extremities: no clubbing, no cyanosis,        LABS:                          9.9    8.71  )-----------( 424      ( 04 Nov 2024 07:55 )             32.1       WBC  8.71 11-04 @ 07:55  10.33 11-03 @ 08:00  9.79 11-01 @ 11:19  9.05 10-31 @ 07:08  12.12 10-30 @ 13:00      11-04    139  |  105  |  9   ----------------------------<  200[H]  3.5   |  30  |  0.87    Ca    9.5      04 Nov 2024 07:55  Mg     2.4     11-04    TPro  6.2  /  Alb  2.2[L]  /  TBili  0.3  /  DBili  x   /  AST  13[L]  /  ALT  13  /  AlkPhos  92  11-03      Creatinine: 0.87 mg/dL (11-04-24 @ 07:55)  Creatinine: 0.79 mg/dL (11-03-24 @ 08:00)  Creatinine: 0.84 mg/dL (11-01-24 @ 11:19)  Creatinine: 0.64 mg/dL (10-31-24 @ 07:08)  Creatinine: 0.85 mg/dL (10-30-24 @ 13:00)        Urinalysis Basic - ( 04 Nov 2024 07:55 )    Color: x / Appearance: x / SG: x / pH: x  Gluc: 200 mg/dL / Ketone: x  / Bili: x / Urobili: x   Blood: x / Protein: x / Nitrite: x   Leuk Esterase: x / RBC: x / WBC x   Sq Epi: x / Non Sq Epi: x / Bacteria: x            INFLAMMATORY MARKERS      MICROBIOLOGY:              RADIOLOGY & ADDITIONAL STUDIES:  < from: CT Foot w/ IV Cont, Right (11.03.24 @ 09:27) >    ACC: [id number] EXAM:  CT 3D RECONSTRUCT [facility]   ORDERED BY: HANNAH REDDY     ACC: [id number] EXAM:  CT FOOT ONLY IC RT   ORDERED BY: HANNAH REDDY     PROCEDURE DATE:  11/03/2024          INTERPRETATION:  CT FOOT WITH IV CONTRAST RIGHT, CT 3D RECONSTRUCTION WO   WORKSTATION    HISTORY:   Concern for right heel abscess. Type II diabetes.    TECHNIQUE: Contiguous axial imaging was performed through the right foot   without contrast. Coronal and sagittal reformatting was utilized. 3-D   reformatting was performed.    COMPARISON:  Frontal x-rays dated 10/30/2024.    FINDINGS:    OSSEOUS STRUCTURES    Fractures:  None.    Postoperative Changes: Resection the calcaneal tuberosity is again seen   with sclerosis and slight irregularity of the amputated margin. Mild   periosteal reaction is also noted.    Marrow:  The patient appears osteopenic.    JOINTS    Tibiotalar Joint:  Maintained although tiny osteophytes are present.    Subtalar Joints:  Maintained.    Intertarsal Joints:  Maintained. Hypertrophic talar ridge is noted.    Tarsometatarsal Joints:  Maintained.    Metatarsophalangeal Joints:  Maintained.    Interphalangeal Joints:  Maintained.    MYOTENDINOUS STRUCTURES  There are postsurgical changes from the calcaneal tuberosity resection.  Severe generalized muscle atrophy and mild edematous change suggests   diabetic neuropathy.  Fluid collection involves the central cord of the plantar fascia at the   level of the navicular measuring approximately 2.8 x 2.2 x 1.0 cm(AP x   TR x CC) with a few gas locules and plantar adjacent wound. Fluid can   collection involves the lateral cord of the plantar fascia at the level   of the calcaneus measuring approximately 1.7 x 1.9 x 0.7 cm (AP x TR x   CC).    OTHER SOFT TISSUES  There is soft tissue induration at the site of the calcaneal resection.   Moderate to severe edema is present. No tracking gas is seen.    IMPRESSION:  1.  Prior resection of the calcaneal tuberosity is again seen with   changes of the resected margin that could reflect active osteomyelitis in   the appropriate setting.  2.  A couple of abscesses involve the plantar fascia at the level of the   calcaneus and navicular.

## 2024-11-04 NOTE — DISCHARGE NOTE PROVIDER - CARE PROVIDERS DIRECT ADDRESSES
,DirectAddress_Unknown ,DirectAddress_Unknown,[email or web address]

## 2024-11-04 NOTE — CONSULT NOTE ADULT - ASSESSMENT
54  year old male past medical history of afib on eliquis  s/p cardiac arrest x 2 w/ AHRF , indwelling Aguilera, cerebral aneurysm, CAD (s/p stents), CVA, T2DM, HTN, OM (s/p debridement), PE, perforated gastric ulcer s/p ometnopexy 2019 with Dr. Mejia  sent from wound care with fever, and infected wound to right foot. Cardiology consulted for pre op risk assessment. Seen at bedside, on room air, denies chest pain dizziness palpitations or shortness of breath, Offers no complaints, states he is here for abscess planning for OR tomorrow 11/4/24.       Afib with RVR, CAD, HTN   -EKG 10/30/24 Af 78 bpm   - has known hx of afib  -continue digoxin amio and BB   -rate controlled by flow sheet 60-70 bpm   -continue midodrine ,  history of orthostatic hypotension   -can hold eliquis for OR  - Monitor and replete Lytes. Keep K > 4 and Mg > 2    - TTE9/4/2024  normal LVF 69% with mod-severe MR,  increased LV mass and concentric hypertrophy, severely dilated LA and RA,  borderline pulmonary hypertension.  -No evidence of volume overload other than LE edema on room air     - With known Hx of CAD  - EKG nonischemic  - No evidence of any active ischemia   - Continue ASA and statin     -Hx anemia, keep hgb > 8 for history of cad     - Abx per ID  -Seen by podiatry - for debridement tomorrow per patient , fu recs   - Pt is optimized best as possible from cardiac standpoint for low risk procedure     - Will continue to follow  Monitor and replete electrolytes. Keep K>4.0 and Mg>2.0.

## 2024-11-04 NOTE — PROCEDURE NOTE - NSPROCNAME_GEN_A_CORE
Pt C/O lower extremity weakness x1 day. No complaints of chest pain, headache, nausea, dizziness, vomiting  SOB, fever, chills verbalized. Pmhx HIV Guillain-Barré syndrome
PICC Line Insertion

## 2024-11-04 NOTE — DISCHARGE NOTE PROVIDER - PROVIDER TOKENS
PROVIDER:[TOKEN:[[id number]],FOLLOWUP:[1 week]] PROVIDER:[TOKEN:[[id number]],FOLLOWUP:[1 week]],PROVIDER:[TOKEN:[[id number]]],PROVIDER:[TOKEN:[[id number]]]

## 2024-11-04 NOTE — DISCHARGE NOTE PROVIDER - HOSPITAL COURSE
54 yr old male w multiple medical problems presenting with fever secondary to R calcaneus abscess      Problem/Plan - 1:  ·  Problem: Abscess of right foot.   ·  Plan: s/p I and D at wound care center   follow up cultures  IV abx - zosyn  per ID  ID eval dr Whitten et al appreciated  podiatry eval appreciated  ? CT.    Problem/Plan - 2:  ·  Problem: Diabetes.   ·  Plan: hold metformin  accuchecks w coverage.    Problem/Plan - 3:  ·  Problem: Afib.   ·  Plan: cont eliquis  toprol for rate control  cardio eval if necessary.    Problem/Plan - 4:  ·  Problem: Orthostatic hypotension.   ·  Plan: monitor for supine hypertension on midodrine.

## 2024-11-04 NOTE — PROGRESS NOTE ADULT - SUBJECTIVE AND OBJECTIVE BOX
Date of Service 11-04-24 @ 14:14    Patient is a 54y old  Male who presents with a chief complaint of heel abscess (03 Nov 2024 09:33)      INTERVAL /OVERNIGHT EVENTS: waiting for podiatry reeval for heel abscess    MEDICATIONS  (STANDING):  aMIOdarone    Tablet 200 milliGRAM(s) Oral daily  apixaban 5 milliGRAM(s) Oral every 12 hours  ascorbic acid 500 milliGRAM(s) Oral daily  aspirin  chewable 81 milliGRAM(s) Oral daily  atorvastatin 40 milliGRAM(s) Oral at bedtime  cetirizine 10 milliGRAM(s) Oral daily  dextrose 5%. 1000 milliLiter(s) (50 mL/Hr) IV Continuous <Continuous>  dextrose 5%. 1000 milliLiter(s) (100 mL/Hr) IV Continuous <Continuous>  dextrose 50% Injectable 25 Gram(s) IV Push once  dextrose 50% Injectable 12.5 Gram(s) IV Push once  dextrose 50% Injectable 25 Gram(s) IV Push once  dextrose Oral Gel 15 Gram(s) Oral once  digoxin     Tablet 125 MICROGram(s) Oral daily  famotidine    Tablet 20 milliGRAM(s) Oral daily  ferrous    sulfate 325 milliGRAM(s) Oral daily  gabapentin 100 milliGRAM(s) Oral three times a day  glucagon  Injectable 1 milliGRAM(s) IntraMuscular once  insulin lispro (ADMELOG) corrective regimen sliding scale   SubCutaneous at bedtime  insulin lispro (ADMELOG) corrective regimen sliding scale   SubCutaneous three times a day before meals  melatonin 5 milliGRAM(s) Oral at bedtime  metoprolol succinate  milliGRAM(s) Oral every 12 hours  midodrine. 15 milliGRAM(s) Oral three times a day  naloxegol 25 milliGRAM(s) Oral daily  oxybutynin XL 10 milliGRAM(s) Oral at bedtime  pantoprazole    Tablet 40 milliGRAM(s) Oral before breakfast  piperacillin/tazobactam IVPB.. 3.375 Gram(s) IV Intermittent every 8 hours  polyethylene glycol 3350 17 Gram(s) Oral daily  senna 2 Tablet(s) Oral at bedtime  sucralfate 1 Gram(s) Oral two times a day  tiotropium 2.5 MICROgram(s)/olodaterol 2.5 MICROgram(s) (STIOLTO) Inhaler 2 Puff(s) Inhalation daily    MEDICATIONS  (PRN):  HYDROmorphone   Tablet 2 milliGRAM(s) Oral every 4 hours PRN Severe Pain (7 - 10)  ondansetron Injectable 4 milliGRAM(s) IV Push every 8 hours PRN Nausea and/or Vomiting  oxyCODONE    IR 5 milliGRAM(s) Oral every 4 hours PRN Moderate Pain (4 - 6)  traMADol 50 milliGRAM(s) Oral every 4 hours PRN Mild Pain (1 - 3)      Allergies    fish (Hives)  ertapenem (Blisters; Rash)    Intolerances        REVIEW OF SYSTEMS:  CONSTITUTIONAL: No fever, weight loss, or fatigue  EYES: No eye pain, visual disturbances, or discharge  ENMT:  No difficulty hearing, tinnitus, vertigo; No sinus or throat pain  NECK: No pain or stiffness  RESPIRATORY: No cough, wheezing, chills or hemoptysis; No shortness of breath  CARDIOVASCULAR: No chest pain, palpitations, dizziness, or leg swelling  GASTROINTESTINAL: No abdominal or epigastric pain. No nausea, vomiting, or hematemesis; No diarrhea or constipation. No melena or hematochezia.  GENITOURINARY: No dysuria, frequency, hematuria, or incontinence  NEUROLOGICAL: No headaches, memory loss, loss of strength, numbness, or tremors  SKIN: No itching, burning, rashes, or lesions   LYMPH NODES: No enlarged glands  ENDOCRINE: No heat or cold intolerance; No hair loss; No polydipsia or polyuria  MUSCULOSKELETAL: + extremity pain  PSYCHIATRIC: No depression, anxiety, mood swings, or difficulty sleeping  HEME/LYMPH: No easy bruising, or bleeding gums  ALLERGY AND IMMUNOLOGIC: No hives or eczema    Vital Signs Last 24 Hrs  T(C): 36.8 (04 Nov 2024 13:30), Max: 36.8 (03 Nov 2024 20:42)  T(F): 98.3 (04 Nov 2024 13:30), Max: 98.3 (04 Nov 2024 04:57)  HR: 62 (04 Nov 2024 13:30) (62 - 69)  BP: 154/92 (04 Nov 2024 13:30) (114/73 - 154/92)  BP(mean): --  RR: 15 (04 Nov 2024 13:30) (15 - 18)  SpO2: 97% (04 Nov 2024 13:30) (96% - 100%)    Parameters below as of 04 Nov 2024 13:30  Patient On (Oxygen Delivery Method): room air        PHYSICAL EXAM:  GENERAL: NAD, well-groomed, well-developed  HEAD:  Atraumatic, Normocephalic  EYES: EOMI, PERRLA, conjunctiva and sclera clear  ENMT: No tonsillar erythema, exudates, or enlargement; Moist mucous membranes, Good dentition, No lesions  NECK: Supple, No JVD, Normal thyroid  NERVOUS SYSTEM:  Alert & Oriented X3, Good concentration; Motor Strength 5/5 B/L upper and lower extremities; DTRs 2+ intact and symmetric  CHEST/LUNG: Clear to auscultation bilaterally; No rales, rhonchi, wheezing, or rubs  HEART: Regular rate and rhythm; No murmurs, rubs, or gallops  ABDOMEN: Soft, Nontender, Nondistended; Bowel sounds present  EXTREMITIES:  2+ Peripheral Pulses, No clubbing, cyanosis, or edema  LYMPH: No lymphadenopathy noted  SKIN: right heel diabetic ulcers / wounds    LABS:                        9.9    8.71  )-----------( 424      ( 04 Nov 2024 07:55 )             32.1     04 Nov 2024 07:55    139    |  105    |  9      ----------------------------<  200    3.5     |  30     |  0.87     Ca    9.5        04 Nov 2024 07:55  Mg     2.4       04 Nov 2024 07:55        Urinalysis Basic - ( 04 Nov 2024 07:55 )    Color: x / Appearance: x / SG: x / pH: x  Gluc: 200 mg/dL / Ketone: x  / Bili: x / Urobili: x   Blood: x / Protein: x / Nitrite: x   Leuk Esterase: x / RBC: x / WBC x   Sq Epi: x / Non Sq Epi: x / Bacteria: x      CAPILLARY BLOOD GLUCOSE      POCT Blood Glucose.: 221 mg/dL (04 Nov 2024 11:50)  POCT Blood Glucose.: 195 mg/dL (04 Nov 2024 07:57)  POCT Blood Glucose.: 309 mg/dL (03 Nov 2024 21:47)  POCT Blood Glucose.: 247 mg/dL (03 Nov 2024 16:47)      RADIOLOGY & ADDITIONAL TESTS:    Notes Reviewed:  [ x] YES  [ ] NO    Care Discussed with Consultants/Other Providers x ] YES  [ ] NO

## 2024-11-05 LAB
GLUCOSE BLDC GLUCOMTR-MCNC: 171 MG/DL — HIGH (ref 70–99)
GLUCOSE BLDC GLUCOMTR-MCNC: 199 MG/DL — HIGH (ref 70–99)
GLUCOSE BLDC GLUCOMTR-MCNC: 215 MG/DL — HIGH (ref 70–99)
GLUCOSE BLDC GLUCOMTR-MCNC: 225 MG/DL — HIGH (ref 70–99)
GLUCOSE BLDC GLUCOMTR-MCNC: 227 MG/DL — HIGH (ref 70–99)
GRAM STN FLD: SIGNIFICANT CHANGE UP
SPECIMEN SOURCE: SIGNIFICANT CHANGE UP

## 2024-11-05 PROCEDURE — 28002 TREATMENT OF FOOT INFECTION: CPT | Mod: RT,79

## 2024-11-05 PROCEDURE — 99232 SBSQ HOSP IP/OBS MODERATE 35: CPT

## 2024-11-05 DEVICE — SURGICEL NU-KNIT 6 X 9": Type: IMPLANTABLE DEVICE | Site: RIGHT | Status: FUNCTIONAL

## 2024-11-05 RX ORDER — NALOXEGOL OXALATE 12.5 MG/1
25 TABLET, FILM COATED ORAL DAILY
Refills: 0 | Status: DISCONTINUED | OUTPATIENT
Start: 2024-11-05 | End: 2024-11-07

## 2024-11-05 RX ORDER — SUCRALFATE 1 G/10ML
1 SUSPENSION ORAL
Refills: 0 | Status: DISCONTINUED | OUTPATIENT
Start: 2024-11-05 | End: 2024-11-07

## 2024-11-05 RX ORDER — METOPROLOL TARTRATE 50 MG
100 TABLET ORAL EVERY 12 HOURS
Refills: 0 | Status: DISCONTINUED | OUTPATIENT
Start: 2024-11-05 | End: 2024-11-07

## 2024-11-05 RX ORDER — GLUCAGON INJECTION, SOLUTION 1 MG/.2ML
1 INJECTION, SOLUTION SUBCUTANEOUS ONCE
Refills: 0 | Status: DISCONTINUED | OUTPATIENT
Start: 2024-11-05 | End: 2024-11-05

## 2024-11-05 RX ORDER — MIDODRINE HYDROCHLORIDE 2.5 MG/1
15 TABLET ORAL THREE TIMES A DAY
Refills: 0 | Status: DISCONTINUED | OUTPATIENT
Start: 2024-11-05 | End: 2024-11-07

## 2024-11-05 RX ORDER — INSULIN LISPRO 100/ML
VIAL (ML) SUBCUTANEOUS EVERY 6 HOURS
Refills: 0 | Status: DISCONTINUED | OUTPATIENT
Start: 2024-11-05 | End: 2024-11-05

## 2024-11-05 RX ORDER — GABAPENTIN 300 MG/1
300 CAPSULE ORAL THREE TIMES A DAY
Refills: 0 | Status: DISCONTINUED | OUTPATIENT
Start: 2024-11-05 | End: 2024-11-07

## 2024-11-05 RX ORDER — TRAMADOL HYDROCHLORIDE 50 MG/1
50 TABLET, COATED ORAL EVERY 4 HOURS
Refills: 0 | Status: DISCONTINUED | OUTPATIENT
Start: 2024-11-05 | End: 2024-11-07

## 2024-11-05 RX ORDER — MELATONIN 5 MG
5 TABLET ORAL AT BEDTIME
Refills: 0 | Status: DISCONTINUED | OUTPATIENT
Start: 2024-11-05 | End: 2024-11-07

## 2024-11-05 RX ORDER — INSULIN LISPRO 100/ML
VIAL (ML) SUBCUTANEOUS AT BEDTIME
Refills: 0 | Status: DISCONTINUED | OUTPATIENT
Start: 2024-11-05 | End: 2024-11-07

## 2024-11-05 RX ORDER — FAMOTIDINE 10 MG/ML
20 INJECTION INTRAVENOUS DAILY
Refills: 0 | Status: DISCONTINUED | OUTPATIENT
Start: 2024-11-05 | End: 2024-11-07

## 2024-11-05 RX ORDER — ASCORBIC ACID 500 MG
500 TABLET ORAL DAILY
Refills: 0 | Status: DISCONTINUED | OUTPATIENT
Start: 2024-11-05 | End: 2024-11-07

## 2024-11-05 RX ORDER — HYDROMORPHONE HCL/0.9% NACL/PF 6 MG/30 ML
0.5 PATIENT CONTROLLED ANALGESIA SYRINGE INTRAVENOUS
Refills: 0 | Status: DISCONTINUED | OUTPATIENT
Start: 2024-11-05 | End: 2024-11-05

## 2024-11-05 RX ORDER — SODIUM CHLORIDE 9 MG/ML
10 INJECTION, SOLUTION INTRAMUSCULAR; INTRAVENOUS; SUBCUTANEOUS
Refills: 0 | Status: DISCONTINUED | OUTPATIENT
Start: 2024-11-05 | End: 2024-11-07

## 2024-11-05 RX ORDER — HYDROMORPHONE HCL/0.9% NACL/PF 6 MG/30 ML
2 PATIENT CONTROLLED ANALGESIA SYRINGE INTRAVENOUS EVERY 4 HOURS
Refills: 0 | Status: DISCONTINUED | OUTPATIENT
Start: 2024-11-05 | End: 2024-11-05

## 2024-11-05 RX ORDER — ASPIRIN/MAG CARB/ALUMINUM AMIN 325 MG
81 TABLET ORAL DAILY
Refills: 0 | Status: DISCONTINUED | OUTPATIENT
Start: 2024-11-05 | End: 2024-11-07

## 2024-11-05 RX ORDER — OXYCODONE HYDROCHLORIDE 30 MG/1
5 TABLET ORAL EVERY 4 HOURS
Refills: 0 | Status: DISCONTINUED | OUTPATIENT
Start: 2024-11-05 | End: 2024-11-07

## 2024-11-05 RX ORDER — GLUCAGON INJECTION, SOLUTION 1 MG/.2ML
1 INJECTION, SOLUTION SUBCUTANEOUS ONCE
Refills: 0 | Status: DISCONTINUED | OUTPATIENT
Start: 2024-11-05 | End: 2024-11-07

## 2024-11-05 RX ORDER — CHLORHEXIDINE GLUCONATE 40 MG/ML
1 SOLUTION TOPICAL
Refills: 0 | Status: DISCONTINUED | OUTPATIENT
Start: 2024-11-05 | End: 2024-11-07

## 2024-11-05 RX ORDER — CETIRIZINE HCL 10 MG/1
10 TABLET ORAL DAILY
Refills: 0 | Status: DISCONTINUED | OUTPATIENT
Start: 2024-11-05 | End: 2024-11-07

## 2024-11-05 RX ORDER — TIOTROPIUM BROMIDE AND OLODATEROL 3.124; 2.736 UG/1; UG/1
2 SPRAY, METERED RESPIRATORY (INHALATION) DAILY
Refills: 0 | Status: DISCONTINUED | OUTPATIENT
Start: 2024-11-05 | End: 2024-11-07

## 2024-11-05 RX ORDER — ONDANSETRON HYDROCHLORIDE 2 MG/ML
4 INJECTION, SOLUTION INTRAMUSCULAR; INTRAVENOUS EVERY 8 HOURS
Refills: 0 | Status: DISCONTINUED | OUTPATIENT
Start: 2024-11-05 | End: 2024-11-07

## 2024-11-05 RX ORDER — FERROUS SULFATE 325(65) MG
325 TABLET ORAL DAILY
Refills: 0 | Status: DISCONTINUED | OUTPATIENT
Start: 2024-11-05 | End: 2024-11-07

## 2024-11-05 RX ORDER — OXYCODONE HYDROCHLORIDE 30 MG/1
5 TABLET ORAL ONCE
Refills: 0 | Status: DISCONTINUED | OUTPATIENT
Start: 2024-11-05 | End: 2024-11-05

## 2024-11-05 RX ORDER — PANTOPRAZOLE SODIUM 40 MG/1
40 TABLET, DELAYED RELEASE ORAL
Refills: 0 | Status: DISCONTINUED | OUTPATIENT
Start: 2024-11-05 | End: 2024-11-07

## 2024-11-05 RX ORDER — AMIODARONE HCL 200 MG
200 TABLET ORAL DAILY
Refills: 0 | Status: DISCONTINUED | OUTPATIENT
Start: 2024-11-05 | End: 2024-11-07

## 2024-11-05 RX ORDER — HYDROMORPHONE HCL/0.9% NACL/PF 6 MG/30 ML
0.5 PATIENT CONTROLLED ANALGESIA SYRINGE INTRAVENOUS ONCE
Refills: 0 | Status: DISCONTINUED | OUTPATIENT
Start: 2024-11-05 | End: 2024-11-05

## 2024-11-05 RX ORDER — PIPERACILLIN AND TAZOBACTAM .5; 4 G/20ML; G/20ML
3.38 INJECTION, POWDER, LYOPHILIZED, FOR SOLUTION INTRAVENOUS EVERY 8 HOURS
Refills: 0 | Status: DISCONTINUED | OUTPATIENT
Start: 2024-11-05 | End: 2024-11-07

## 2024-11-05 RX ORDER — ASPIRIN/MAG CARB/ALUMINUM AMIN 325 MG
81 TABLET ORAL DAILY
Refills: 0 | Status: DISCONTINUED | OUTPATIENT
Start: 2024-11-05 | End: 2024-11-05

## 2024-11-05 RX ORDER — INSULIN LISPRO 100/ML
VIAL (ML) SUBCUTANEOUS
Refills: 0 | Status: DISCONTINUED | OUTPATIENT
Start: 2024-11-05 | End: 2024-11-07

## 2024-11-05 RX ORDER — DIGOXIN 250 MCG
125 TABLET ORAL DAILY
Refills: 0 | Status: DISCONTINUED | OUTPATIENT
Start: 2024-11-05 | End: 2024-11-07

## 2024-11-05 RX ORDER — POLYETHYLENE GLYCOL 3350 17 G/17G
17 POWDER, FOR SOLUTION ORAL DAILY
Refills: 0 | Status: DISCONTINUED | OUTPATIENT
Start: 2024-11-05 | End: 2024-11-07

## 2024-11-05 RX ORDER — SENNA 187 MG
2 TABLET ORAL AT BEDTIME
Refills: 0 | Status: DISCONTINUED | OUTPATIENT
Start: 2024-11-05 | End: 2024-11-07

## 2024-11-05 RX ORDER — ONDANSETRON HYDROCHLORIDE 2 MG/ML
4 INJECTION, SOLUTION INTRAMUSCULAR; INTRAVENOUS ONCE
Refills: 0 | Status: DISCONTINUED | OUTPATIENT
Start: 2024-11-05 | End: 2024-11-05

## 2024-11-05 RX ORDER — HYDROMORPHONE HCL/0.9% NACL/PF 6 MG/30 ML
0.2 PATIENT CONTROLLED ANALGESIA SYRINGE INTRAVENOUS EVERY 4 HOURS
Refills: 0 | Status: DISCONTINUED | OUTPATIENT
Start: 2024-11-05 | End: 2024-11-07

## 2024-11-05 RX ADMIN — CETIRIZINE HCL 10 MILLIGRAM(S): 10 TABLET ORAL at 14:02

## 2024-11-05 RX ADMIN — Medication 2 MILLIGRAM(S): at 06:00

## 2024-11-05 RX ADMIN — PIPERACILLIN AND TAZOBACTAM 25 GRAM(S): .5; 4 INJECTION, POWDER, LYOPHILIZED, FOR SOLUTION INTRAVENOUS at 05:30

## 2024-11-05 RX ADMIN — Medication 100 MILLIGRAM(S): at 05:30

## 2024-11-05 RX ADMIN — Medication 200 MILLIGRAM(S): at 05:30

## 2024-11-05 RX ADMIN — POLYETHYLENE GLYCOL 3350 17 GRAM(S): 17 POWDER, FOR SOLUTION ORAL at 14:27

## 2024-11-05 RX ADMIN — TIOTROPIUM BROMIDE AND OLODATEROL 2 PUFF(S): 3.124; 2.736 SPRAY, METERED RESPIRATORY (INHALATION) at 06:48

## 2024-11-05 RX ADMIN — SUCRALFATE 1 GRAM(S): 1 SUSPENSION ORAL at 17:57

## 2024-11-05 RX ADMIN — Medication 2 TABLET(S): at 22:48

## 2024-11-05 RX ADMIN — Medication 125 MICROGRAM(S): at 17:57

## 2024-11-05 RX ADMIN — Medication 40 MILLIGRAM(S): at 22:47

## 2024-11-05 RX ADMIN — PIPERACILLIN AND TAZOBACTAM 25 GRAM(S): .5; 4 INJECTION, POWDER, LYOPHILIZED, FOR SOLUTION INTRAVENOUS at 22:47

## 2024-11-05 RX ADMIN — Medication 5 MILLIGRAM(S): at 22:48

## 2024-11-05 RX ADMIN — Medication 125 MICROGRAM(S): at 05:30

## 2024-11-05 RX ADMIN — CHLORHEXIDINE GLUCONATE 1 APPLICATION(S): 40 SOLUTION TOPICAL at 06:33

## 2024-11-05 RX ADMIN — Medication 0.5 MILLIGRAM(S): at 12:43

## 2024-11-05 RX ADMIN — MIDODRINE HYDROCHLORIDE 15 MILLIGRAM(S): 2.5 TABLET ORAL at 05:30

## 2024-11-05 RX ADMIN — Medication 2: at 17:08

## 2024-11-05 RX ADMIN — Medication 81 MILLIGRAM(S): at 14:26

## 2024-11-05 RX ADMIN — MIDODRINE HYDROCHLORIDE 15 MILLIGRAM(S): 2.5 TABLET ORAL at 22:54

## 2024-11-05 RX ADMIN — GABAPENTIN 300 MILLIGRAM(S): 300 CAPSULE ORAL at 05:30

## 2024-11-05 RX ADMIN — Medication 0.2 MILLIGRAM(S): at 22:50

## 2024-11-05 RX ADMIN — PIPERACILLIN AND TAZOBACTAM 25 GRAM(S): .5; 4 INJECTION, POWDER, LYOPHILIZED, FOR SOLUTION INTRAVENOUS at 14:02

## 2024-11-05 RX ADMIN — PANTOPRAZOLE SODIUM 40 MILLIGRAM(S): 40 TABLET, DELAYED RELEASE ORAL at 05:30

## 2024-11-05 RX ADMIN — GABAPENTIN 300 MILLIGRAM(S): 300 CAPSULE ORAL at 14:27

## 2024-11-05 RX ADMIN — Medication 2 MILLIGRAM(S): at 05:30

## 2024-11-05 RX ADMIN — CHLORHEXIDINE GLUCONATE 1 APPLICATION(S): 40 SOLUTION TOPICAL at 17:18

## 2024-11-05 RX ADMIN — Medication 325 MILLIGRAM(S): at 14:01

## 2024-11-05 RX ADMIN — MIDODRINE HYDROCHLORIDE 15 MILLIGRAM(S): 2.5 TABLET ORAL at 14:01

## 2024-11-05 RX ADMIN — GABAPENTIN 300 MILLIGRAM(S): 300 CAPSULE ORAL at 22:47

## 2024-11-05 RX ADMIN — Medication 75 MILLILITER(S): at 13:25

## 2024-11-05 RX ADMIN — Medication 0.5 MILLIGRAM(S): at 17:56

## 2024-11-05 RX ADMIN — Medication 100 MILLIGRAM(S): at 17:57

## 2024-11-05 RX ADMIN — Medication 0.2 MILLIGRAM(S): at 23:20

## 2024-11-05 RX ADMIN — Medication 500 MILLIGRAM(S): at 14:26

## 2024-11-05 RX ADMIN — Medication 0.5 MILLIGRAM(S): at 18:56

## 2024-11-05 NOTE — PROGRESS NOTE ADULT - ASSESSMENT
in progress    54  year old male past medical history of afib on eliquis  s/p cardiac arrest x 2 w/ AHRF , indwelling Aguilera, cerebral aneurysm, CAD (s/p stents), CVA, T2DM, HTN, OM (s/p debridement), PE, perforated gastric ulcer s/p ometnopexy 2019 with Dr. Mejia  sent from wound care with fever, and infected wound to right foot. Cardiology consulted for pre op risk assessment. Seen at bedside, on room air, denies chest pain dizziness palpitations or shortness of breath, Offers no complaints, states he is here for abscess planning for OR tomorrow 11/4/24.       Afib with RVR, CAD, HTN   -EKG 10/30/24 Af 78 bpm   - has known hx of afib  -continue digoxin, amio and toprol 100mg bid  -rate controlled by flow sheet  -continue midodrine 15mjg TID ,  history of orthostatic hypotension   -can hold eliquis for OR    - TTE9/4/2024  normal LVF 69% with mod-severe MR,  increased LV mass and concentric hypertrophy, severely dilated LA and RA,  borderline pulmonary hypertension.  -No evidence of volume overload     - With known Hx of CAD  - EKG nonischemic  - No evidence of any active ischemia   - Continue ASA 81mg and statin 40mg    -Hx anemia, keep hgb > 8 for history of cad     - Abx per ID  -Seen by podiatry; scheduled for right plantar midfoot incision and drainage with pulse lavage. Pt is optimized best as possible from cardiac standpoint.    - Monitor and replete lytes, keep K>4, Mg>2.  - Will continue to follow.    Nery Alex NP  Nurse Practitioner- Cardiology   Call TEAMS 54  year old male past medical history of afib on eliquis  s/p cardiac arrest x 2 w/ AHRF , indwelling Aguilera, cerebral aneurysm, CAD (s/p stents), CVA, T2DM, HTN, OM (s/p debridement), PE, perforated gastric ulcer s/p ometnopexy 2019 with Dr. Mejia  sent from wound care with fever, and infected wound to right foot. Cardiology consulted for pre op risk assessment. Seen at bedside, on room air, denies chest pain dizziness palpitations or shortness of breath, Offers no complaints, states he is here for scheduled for right plantar midfoot incision and drainage with pulse lavage.      Afib with RVR, CAD, HTN   -EKG 10/30/24 Af 78 bpm   - has known hx of afib  -continue digoxin, amio and toprol 100mg bid  -rate controlled by flow sheet  -continue midodrine 15mjg TID ,  history of orthostatic hypotension   -can hold eliquis for OR    - TTE9/4/2024  normal LVF 69% with mod-severe MR,  increased LV mass and concentric hypertrophy, severely dilated LA and RA,  borderline pulmonary hypertension.  -No evidence of volume overload     - With known Hx of CAD  - EKG nonischemic  - No evidence of any active ischemia   - Continue ASA 81mg and statin 40mg    -Hx anemia, keep hgb > 8 for history of cad     - Abx per ID  -Seen by podiatry; scheduled for right plantar midfoot incision and drainage with pulse lavage. Pt is optimized best as possible from cardiac standpoint.    - Monitor and replete lytes, keep K>4, Mg>2.  - Will continue to follow.    Nery Alex NP  Nurse Practitioner- Cardiology   Call TEAMS 54  year old male past medical history of afib on eliquis  s/p cardiac arrest x 2 w/ AHRF , indwelling Aguilera, cerebral aneurysm, CAD (s/p stents), CVA, T2DM, HTN, OM (s/p debridement), PE, perforated gastric ulcer s/p ometnopexy 2019 with Dr. Mejia  sent from wound care with fever, and infected wound to right foot. Cardiology consulted for pre op risk assessment. Seen at bedside, on room air, denies chest pain dizziness palpitations or shortness of breath, Offers no complaints, states he is here for scheduled for right plantar midfoot incision and drainage with pulse lavage.    Afib with RVR, CAD, HTN   -EKG 10/30/24 Af 78 bpm   - has known hx of afib  -continue digoxin, amio and toprol 100mg BID  -rate controlled by flow sheet  -continue midodrine 15mg TID, history of orthostatic hypotension   -can hold eliquis for OR    - TTE9/4/2024  normal LVF 69% with mod-severe MR,  increased LV mass and concentric hypertrophy, severely dilated LA and RA,  borderline pulmonary hypertension.  -No evidence of volume overload     - With known Hx of CAD  - EKG nonischemic  - No evidence of any active ischemia   - Continue ASA 81mg and statin 40mg    -Hx anemia, keep hgb > 8 for history of cad     - Abx per ID  -Seen by podiatry; scheduled for right plantar midfoot incision and drainage with pulse lavage. Pt is optimized best as possible from cardiac standpoint.    - Monitor and replete lytes, keep K>4, Mg>2.  - Will continue to follow.    Nery Alex NP  Nurse Practitioner- Cardiology   Call TEAMS

## 2024-11-05 NOTE — PRE-OP CHECKLIST - DENTURES
Patient presenting with x3 day history of SOB, low suspicion for cardiac etiology not fluid overloaded on exam, no ischemic changes on EKG. No anemia. SOB likely pulmonary in etiology due to covid.
Patient presenting with x3 day history of SOB, low suspicion for cardiac etiology not fluid overloaded on exam, no ischemic changes on EKG. No anemia. SOB likely pulmonary in etiology due to covid.
no

## 2024-11-05 NOTE — BRIEF OPERATIVE NOTE - NSICDXBRIEFPROCEDURE_GEN_ALL_CORE_FT
PROCEDURES:  Deep incision and drainage of multiple areas of foot 05-Nov-2024 12:33:11  Cristi Mcbride

## 2024-11-05 NOTE — PROGRESS NOTE ADULT - ASSESSMENT
54m with dm, cad, pad, dvt atrial fib, hypertension, stroke, indwelling jacques, PE, indwelling jacques, prior diagnosis of Right Heel Osteomyelitis  Culture - Wound Aerobic/Anaerobic (09.05.24 @ 12:55):  Escherichia coli ESBL (-  Piperacillin/Tazobactam: R <=8), Citrobacter freundii, Providencia stuartii sent from wound care with fever, and infected wound to right foot, drained in the office and sent for culture, pt has hx of recurrent infections and severe reaction to ertapenem.    RECOMMENDATIONS  Right heel abscess with surrounding cellulitis  Culture - Wound Aerobic/Anaerobic (09.05.24 @ 12:55):  Escherichia coli ESBL (-  Piperacillin/Tazobactam: R <=8), Citrobacter freundii, Providencia stuartii  Culture - Tissue with Gram Stain (10.30.24 @ 11:09)  Citrobacter freundii complex-(high risk for inducible resistance), Escherichia coli ESBL, Staphylococcus haemolyticus, Enterococcus avium  Enterobacter cloacae complex, Klebsiella aerogenes, and Citrobacter freundii are the most common Enterobacterales at moderate risk for clinically significant inducible AmpC production and challenging antimicrobial Rx with severe carbapenem intolerance     CT-11/3-active osteomyelitis in the appropriate setting,  A couple of abscesses involve the plantar fascia at the level of the calcaneus and navicular.    Vancomycin started 10/30-stopped with no evidence of MRSA but of note pt with resistant Staphylococcus haemolyticus  Zosyn started 10/30-continue for now placed PICC and plan for 6 weeks abx from 11/5  plan for surgery 11/5 and then based on micro obtained anticipate  Zosyn 3.375 grams IV q8 extended infusion with last day 12/16  weekly cbc w diff, CMP  -ideal to have micro from 11/5 surgery for final abx plan    Thank you for consulting us and involving us in the management of this most interesting and challenging case.  We will follow along in the care of this patient. Please call us at [phone] or text me directly on my cell# at [phone] with any concerns.

## 2024-11-05 NOTE — SOCIAL WORK PROGRESS NOTE - NSSWPROGRESSNOTE_GEN_ALL_CORE
per emr, pt in OR today for debridement of foot. Plan remains for pt to ret to [facility] ltc on dc. sw to continue to follow for anticipated ret to [facility] ltc.

## 2024-11-05 NOTE — PROGRESS NOTE ADULT - SUBJECTIVE AND OBJECTIVE BOX
Date of Service 11-05-24 @ 12:02    Patient is a 54y old  Male who presents with a chief complaint of cardiac optimization (04 Nov 2024 14:37)      INTERVAL /OVERNIGHT EVENTS: c/o heel pain    MEDICATIONS  (STANDING):    MEDICATIONS  (PRN):      Allergies    fish (Hives)  ertapenem (Blisters; Rash)    Intolerances        REVIEW OF SYSTEMS:  CONSTITUTIONAL: No fever, weight loss, or fatigue  EYES: No eye pain, visual disturbances, or discharge  ENMT:  No difficulty hearing, tinnitus, vertigo; No sinus or throat pain  NECK: No pain or stiffness  RESPIRATORY: No cough, wheezing, chills or hemoptysis; No shortness of breath  CARDIOVASCULAR: No chest pain, palpitations, dizziness, or leg swelling  GASTROINTESTINAL: No abdominal or epigastric pain. No nausea, vomiting, or hematemesis; No diarrhea or constipation. No melena or hematochezia.  GENITOURINARY: No dysuria, frequency, hematuria, or incontinence  NEUROLOGICAL: No headaches, memory loss, loss of strength, numbness, or tremors  SKIN: No itching, burning, rashes, or lesions   LYMPH NODES: No enlarged glands  ENDOCRINE: No heat or cold intolerance; No hair loss; No polydipsia or polyuria  MUSCULOSKELETAL: No joint pain or swelling; No muscle, back, or extremity pain  PSYCHIATRIC: No depression, anxiety, mood swings, or difficulty sleeping  HEME/LYMPH: No easy bruising, or bleeding gums  ALLERGY AND IMMUNOLOGIC: No hives or eczema    Vital Signs Last 24 Hrs  T(C): 36.7 (05 Nov 2024 11:12), Max: 36.9 (04 Nov 2024 20:11)  T(F): 98.1 (05 Nov 2024 11:12), Max: 98.5 (04 Nov 2024 20:11)  HR: 67 (05 Nov 2024 11:12) (62 - 67)  BP: 132/80 (05 Nov 2024 11:12) (115/67 - 164/82)  BP(mean): --  RR: 12 (05 Nov 2024 11:12) (12 - 18)  SpO2: 97% (05 Nov 2024 11:12) (92% - 97%)    Parameters below as of 05 Nov 2024 11:12  Patient On (Oxygen Delivery Method): room air        PHYSICAL EXAM:  GENERAL: NAD, well-groomed, well-developed  HEAD:  Atraumatic, Normocephalic  EYES: EOMI, PERRLA, conjunctiva and sclera clear  ENMT: No tonsillar erythema, exudates, or enlargement; Moist mucous membranes, Good dentition, No lesions  NECK: Supple, No JVD, Normal thyroid  NERVOUS SYSTEM:  Alert & Oriented X3, Good concentration; Motor Strength 5/5 B/L upper and lower extremities; DTRs 2+ intact and symmetric  CHEST/LUNG: Clear to auscultation bilaterally; No rales, rhonchi, wheezing, or rubs  HEART: Regular rate and rhythm; No murmurs, rubs, or gallops  ABDOMEN: Soft, Nontender, Nondistended; Bowel sounds present  EXTREMITIES:  right heel diabetic ulcer  LYMPH: No lymphadenopathy noted  SKIN: No rashes or lesions    LABS:      Ca    9.5        04 Nov 2024 07:55        Urinalysis Basic - ( 04 Nov 2024 07:55 )    Color: x / Appearance: x / SG: x / pH: x  Gluc: 200 mg/dL / Ketone: x  / Bili: x / Urobili: x   Blood: x / Protein: x / Nitrite: x   Leuk Esterase: x / RBC: x / WBC x   Sq Epi: x / Non Sq Epi: x / Bacteria: x      CAPILLARY BLOOD GLUCOSE      POCT Blood Glucose.: 215 mg/dL (05 Nov 2024 11:12)  POCT Blood Glucose.: 227 mg/dL (05 Nov 2024 06:47)  POCT Blood Glucose.: 244 mg/dL (04 Nov 2024 21:40)  POCT Blood Glucose.: 205 mg/dL (04 Nov 2024 17:22)      RADIOLOGY & ADDITIONAL TESTS:    Notes Reviewed:  [x ] YES  [ ] NO    Care Discussed with Consultants/Other Providers [x ] YES  [ ] NO

## 2024-11-05 NOTE — PROGRESS NOTE ADULT - SUBJECTIVE AND OBJECTIVE BOX
[facility] DIVISION of INFECTIOUS DISEASE  Juventino Whitten MD PhD, Symone Hickey MD, Anne-Marie Li MD, Jeffery Jacobs MD, Ryan Romeo MD  and providing coverage with Melody Armstrong MD  Providing Infectious Disease Consultations at [facility], [facility], [facility], [facility]'s    Office# [phone] to schedule follow up appointments  Answering Service for urgent calls or New Consults [phone]  Cell# to text for urgent issues Juventino Whitten [phone]     infectious diseases progress note:    SARAH MORRISON is a 54y y. o. Male patient    Overnight and events of the last 24hrs reviewed    Allergies    fish (Hives)  ertapenem (Blisters; Rash)    Intolerances        ANTIBIOTICS/RELEVANT:  antimicrobials  piperacillin/tazobactam IVPB.. 3.375 Gram(s) IV Intermittent every 8 hours    immunologic:    OTHER:  aMIOdarone    Tablet 200 milliGRAM(s) Oral daily  ascorbic acid 500 milliGRAM(s) Oral daily  aspirin  chewable 81 milliGRAM(s) Oral daily  atorvastatin 40 milliGRAM(s) Oral at bedtime  cetirizine 10 milliGRAM(s) Oral daily  chlorhexidine 2% Cloths 1 Application(s) Topical <User Schedule>  dextrose 5%. 1000 milliLiter(s) IV Continuous <Continuous>  dextrose 5%. 1000 milliLiter(s) IV Continuous <Continuous>  dextrose 5%. 1000 milliLiter(s) IV Continuous <Continuous>  dextrose 5%. 1000 milliLiter(s) IV Continuous <Continuous>  dextrose 50% Injectable 12.5 Gram(s) IV Push once  dextrose 50% Injectable 25 Gram(s) IV Push once  dextrose 50% Injectable 25 Gram(s) IV Push once  dextrose Oral Gel 15 Gram(s) Oral once PRN  digoxin     Tablet 125 MICROGram(s) Oral daily  famotidine    Tablet 20 milliGRAM(s) Oral daily  ferrous    sulfate 325 milliGRAM(s) Oral daily  gabapentin 300 milliGRAM(s) Oral three times a day  glucagon  Injectable 1 milliGRAM(s) IntraMuscular once  HYDROmorphone   Tablet 2 milliGRAM(s) Oral every 4 hours PRN  insulin lispro (ADMELOG) corrective regimen sliding scale   SubCutaneous every 6 hours  melatonin 5 milliGRAM(s) Oral at bedtime  metoprolol succinate  milliGRAM(s) Oral every 12 hours  midodrine. 15 milliGRAM(s) Oral three times a day  naloxegol 25 milliGRAM(s) Oral daily  ondansetron Injectable 4 milliGRAM(s) IV Push every 8 hours PRN  oxybutynin XL 10 milliGRAM(s) Oral at bedtime  oxyCODONE    IR 5 milliGRAM(s) Oral every 4 hours PRN  pantoprazole    Tablet 40 milliGRAM(s) Oral before breakfast  polyethylene glycol 3350 17 Gram(s) Oral daily  senna 2 Tablet(s) Oral at bedtime  sodium chloride 0.9% lock flush 10 milliLiter(s) IV Push every 1 hour PRN  sucralfate 1 Gram(s) Oral two times a day  tiotropium 2.5 MICROgram(s)/olodaterol 2.5 MICROgram(s) (STIOLTO) Inhaler 2 Puff(s) Inhalation daily  traMADol 50 milliGRAM(s) Oral every 4 hours PRN      Objective:  Vital Signs Last 24 Hrs  T(C): 36.8 (05 Nov 2024 05:12), Max: 36.9 (04 Nov 2024 20:11)  T(F): 98.2 (05 Nov 2024 05:12), Max: 98.5 (04 Nov 2024 20:11)  HR: 64 (05 Nov 2024 05:12) (62 - 65)  BP: 151/75 (05 Nov 2024 05:12) (115/67 - 164/82)  BP(mean): --  RR: 18 (05 Nov 2024 05:12) (15 - 18)  SpO2: 92% (05 Nov 2024 05:12) (92% - 97%)    Parameters below as of 05 Nov 2024 05:12  Patient On (Oxygen Delivery Method): room air        T(C): 36.8 (11-05-24 @ 05:12), Max: 36.9 (11-04-24 @ 20:11)  T(C): 36.8 (11-05-24 @ 05:12), Max: 36.9 (11-03-24 @ 05:07)  T(C): 36.8 (11-05-24 @ 05:12), Max: 37 (11-01-24 @ 13:01)    PHYSICAL EXAM:  HEENT: NC atraumatic  Neck: supple  Respiratory: no accessory muscle use, breathing comfortably  Cardiovascular: distant  Gastrointestinal: normal appearing, nondistended  Extremities: no clubbing, no cyanosis,        LABS:                          9.9    8.71  )-----------( 424      ( 04 Nov 2024 07:55 )             32.1       WBC  8.71 11-04 @ 07:55  10.33 11-03 @ 08:00  9.79 11-01 @ 11:19  9.05 10-31 @ 07:08  12.12 10-30 @ 13:00      11-04    139  |  105  |  9   ----------------------------<  200[H]  3.5   |  30  |  0.87    Ca    9.5      04 Nov 2024 07:55  Mg     2.4     11-04        Creatinine: 0.87 mg/dL (11-04-24 @ 07:55)  Creatinine: 0.79 mg/dL (11-03-24 @ 08:00)  Creatinine: 0.84 mg/dL (11-01-24 @ 11:19)  Creatinine: 0.64 mg/dL (10-31-24 @ 07:08)  Creatinine: 0.85 mg/dL (10-30-24 @ 13:00)        Urinalysis Basic - ( 04 Nov 2024 07:55 )    Color: x / Appearance: x / SG: x / pH: x  Gluc: 200 mg/dL / Ketone: x  / Bili: x / Urobili: x   Blood: x / Protein: x / Nitrite: x   Leuk Esterase: x / RBC: x / WBC x   Sq Epi: x / Non Sq Epi: x / Bacteria: x            INFLAMMATORY MARKERS      MICROBIOLOGY:              RADIOLOGY & ADDITIONAL STUDIES:

## 2024-11-05 NOTE — PROGRESS NOTE ADULT - SUBJECTIVE AND OBJECTIVE BOX
[facility] --  Brittany Lutz Pannella, Patel, Savella, Goodger, Cohen  Office # [phone]    Follow Up:  cardiac optimization    Subjective/Observations: in progress    REVIEW OF SYSTEMS: All other review of systems is negative unless indicated above  PAST MEDICAL & SURGICAL HISTORY:  Diabetes      Diabetes mellitus with no complication      Afib      Hypertension      BPH (benign prostatic hyperplasia)      Perforated gastric ulcer  s/p emergent ex-lap omentopexy and plication 6/2019      Pulmonary embolism      History of non-ST elevation myocardial infarction (NSTEMI)      Osteomyelitis  s/p debridement      CAD S/P percutaneous coronary angioplasty      Cerebrovascular accident      H/O abdominal surgery      Perforated gastric ulcer      Traumatic amputation of left foot, initial encounter        MEDICATIONS  (STANDING):  aMIOdarone    Tablet 200 milliGRAM(s) Oral daily  ascorbic acid 500 milliGRAM(s) Oral daily  aspirin  chewable 81 milliGRAM(s) Oral daily  atorvastatin 40 milliGRAM(s) Oral at bedtime  cetirizine 10 milliGRAM(s) Oral daily  chlorhexidine 2% Cloths 1 Application(s) Topical <User Schedule>  dextrose 5%. 1000 milliLiter(s) (100 mL/Hr) IV Continuous <Continuous>  dextrose 5%. 1000 milliLiter(s) (50 mL/Hr) IV Continuous <Continuous>  dextrose 5%. 1000 milliLiter(s) (50 mL/Hr) IV Continuous <Continuous>  dextrose 5%. 1000 milliLiter(s) (100 mL/Hr) IV Continuous <Continuous>  dextrose 50% Injectable 25 Gram(s) IV Push once  dextrose 50% Injectable 12.5 Gram(s) IV Push once  dextrose 50% Injectable 25 Gram(s) IV Push once  digoxin     Tablet 125 MICROGram(s) Oral daily  famotidine    Tablet 20 milliGRAM(s) Oral daily  ferrous    sulfate 325 milliGRAM(s) Oral daily  gabapentin 300 milliGRAM(s) Oral three times a day  glucagon  Injectable 1 milliGRAM(s) IntraMuscular once  insulin lispro (ADMELOG) corrective regimen sliding scale   SubCutaneous every 6 hours  melatonin 5 milliGRAM(s) Oral at bedtime  metoprolol succinate  milliGRAM(s) Oral every 12 hours  midodrine. 15 milliGRAM(s) Oral three times a day  naloxegol 25 milliGRAM(s) Oral daily  oxybutynin XL 10 milliGRAM(s) Oral at bedtime  pantoprazole    Tablet 40 milliGRAM(s) Oral before breakfast  piperacillin/tazobactam IVPB.. 3.375 Gram(s) IV Intermittent every 8 hours  polyethylene glycol 3350 17 Gram(s) Oral daily  senna 2 Tablet(s) Oral at bedtime  sucralfate 1 Gram(s) Oral two times a day  tiotropium 2.5 MICROgram(s)/olodaterol 2.5 MICROgram(s) (STIOLTO) Inhaler 2 Puff(s) Inhalation daily    MEDICATIONS  (PRN):  dextrose Oral Gel 15 Gram(s) Oral once PRN Blood Glucose LESS THAN 70 milliGRAM(s)/deciliter  HYDROmorphone   Tablet 2 milliGRAM(s) Oral every 4 hours PRN Severe Pain (7 - 10)  ondansetron Injectable 4 milliGRAM(s) IV Push every 8 hours PRN Nausea and/or Vomiting  oxyCODONE    IR 5 milliGRAM(s) Oral every 4 hours PRN Moderate Pain (4 - 6)  sodium chloride 0.9% lock flush 10 milliLiter(s) IV Push every 1 hour PRN Pre/post blood products, medications, blood draw, and to maintain line patency  traMADol 50 milliGRAM(s) Oral every 4 hours PRN Mild Pain (1 - 3)    Allergies    fish (Hives)  ertapenem (Blisters; Rash)    Intolerances      Vital Signs Last 24 Hrs  T(C): 36.8 (05 Nov 2024 05:12), Max: 36.9 (04 Nov 2024 20:11)  T(F): 98.2 (05 Nov 2024 05:12), Max: 98.5 (04 Nov 2024 20:11)  HR: 64 (05 Nov 2024 05:12) (62 - 65)  BP: 151/75 (05 Nov 2024 05:12) (115/67 - 164/82)  BP(mean): --  RR: 18 (05 Nov 2024 05:12) (15 - 18)  SpO2: 92% (05 Nov 2024 05:12) (92% - 97%)    Parameters below as of 05 Nov 2024 05:12  Patient On (Oxygen Delivery Method): room air      I&O's Summary    04 Nov 2024 07:01  -  05 Nov 2024 07:00  --------------------------------------------------------  IN: 200 mL / OUT: 1200 mL / NET: -1000 mL     Physical exam:  Constitutional: NAD, alert and oriented x 3  Lungs:  Non-labored, breath sounds are clear bilaterally, No wheezing, rales or rhonchi  Cardiovascular: IRRR.  S1 and S2 positive.  + murmur   Gastrointestinal: Bowel Sounds present, soft, nontender.   Lymph:no edema. No cervical lymphadenopathy.  Neurological: Alert, no focal deficits  Skin: No rashes or ulcers foot dressing   Psych:  Mood & affect appropriate.    LABS: All Labs Reviewed:                        9.9    8.71  )-----------( 424      ( 04 Nov 2024 07:55 )             32.1                         10.1   10.33 )-----------( 473      ( 03 Nov 2024 08:00 )             32.5     04 Nov 2024 07:55    139    |  105    |  9      ----------------------------<  200    3.5     |  30     |  0.87   03 Nov 2024 08:00    141    |  103    |  8      ----------------------------<  165    3.7     |  28     |  0.79     Ca    9.5        04 Nov 2024 07:55  Ca    8.9        03 Nov 2024 08:00  Mg     2.4       04 Nov 2024 07:55    TPro  6.2    /  Alb  2.2    /  TBili  0.3    /  DBili  x      /  AST  13     /  ALT  13     /  AlkPhos  92     03 Nov 2024 08:00          12 Lead ECG:   Ventricular Rate 78 BPM    QRS Duration 74 ms    Q-T Interval 366 ms    QTC Calculation(Bazett) 417 ms    R Axis -24 degrees    T Axis 135 degrees    Diagnosis Line Atrial fibrillation  Low voltage QRS  Nonspecific T wave abnormality  Abnormal ECG  Confirmed by darien France ([id number]) on 10/30/2024 2:34:23 PM (10-30-24 @ 13:02)       EXAM:  ECHO TTE WO CON COMP W DOPPLR         PROCEDURE DATE:  12/24/2019        INTERPRETATION:  INDICATION: Coronary artery disease    Blood Pressure 117/71    Height 187     Weight 93       BSA 2.2    Dimensions:    LA 4.2       Normal Values: 2.0 - 4.0 cm    Ao 4.0        Normal Values: 2.0 - 3.8 cm  SEPTUM 1.6       Normal Values: 0.6 - 1.2 cm  PWT 1.6       Normal Values: 0.6 - 1.1 cm  LVIDd 5.8         Normal Values: 3.0 - 5.6 cm  LVIDs 3.2         Normal Values: 1.8 - 4.0 cm    Derived Variables:  LVMI g/m2  RWT  Fractional Short  Ejection Fraction    Doppler Peak v. AoV= (m/sec)    OBSERVATIONS:    Mitral Valve: normal, mild MR.  Aortic Valve/Aorta: normal trileaflet aortic valve.  Tricuspid Valve: normal with trace TR.  Pulmonic Valve: normal  Left Atrium: Enlarged  Right Atrium: normal  Left Ventricle: Severe concentric LVH with normal systolic function, estimated LVEF of 65%.  Right Ventricle: normal size and systolic function.  Pericardium/Pleura: no significant pleural effusion, no significant pericardial effusion.  Pulmonary/RV Pressure: Inadequate TR jet to estimate PA systolic pressure  LV Diastolic Function: Grade 2diastolic dysfunction.    Severe concentric LVH, and mild LV enlargement, with normal systolic function, estimated LVEF of 65%. Normal right ventricular size and systolic function. Grade 2diastolic dysfunction. Left atrial enlargement The aortic root is normal in size. The mitral valve is structurally normal, mild MR. The aortic valve is trileaflet without stenosis or insufficiency. Trace physiologic TR. Inadequate TR jet to estimate PA systolic pressure No significant pericardial effusion.                  JOVANNI ALVAREZ M.D., ATTENDING CARDIOLOGIST  This document has been electronically signed. Dec 25 2019 11:45AM                  [facility] --  Brittany Lutz Pannella, Patel, Savella, Goodger, Cohen  Office # [phone]    Follow Up:  cardiac optimization    Subjective/Observations: No events overnight resting comfortably in bed.  No complaints of chest pain, dyspnea, or palpitations reported. No signs of orthopnea or PND. toleraring RA.  npo since midnight for or today    REVIEW OF SYSTEMS: All other review of systems is negative unless indicated above  PAST MEDICAL & SURGICAL HISTORY:  Diabetes      Diabetes mellitus with no complication      Afib      Hypertension      BPH (benign prostatic hyperplasia)      Perforated gastric ulcer  s/p emergent ex-lap omentopexy and plication 6/2019      Pulmonary embolism      History of non-ST elevation myocardial infarction (NSTEMI)      Osteomyelitis  s/p debridement      CAD S/P percutaneous coronary angioplasty      Cerebrovascular accident      H/O abdominal surgery      Perforated gastric ulcer      Traumatic amputation of left foot, initial encounter        MEDICATIONS  (STANDING):  aMIOdarone    Tablet 200 milliGRAM(s) Oral daily  ascorbic acid 500 milliGRAM(s) Oral daily  aspirin  chewable 81 milliGRAM(s) Oral daily  atorvastatin 40 milliGRAM(s) Oral at bedtime  cetirizine 10 milliGRAM(s) Oral daily  chlorhexidine 2% Cloths 1 Application(s) Topical <User Schedule>  dextrose 5%. 1000 milliLiter(s) (100 mL/Hr) IV Continuous <Continuous>  dextrose 5%. 1000 milliLiter(s) (50 mL/Hr) IV Continuous <Continuous>  dextrose 5%. 1000 milliLiter(s) (50 mL/Hr) IV Continuous <Continuous>  dextrose 5%. 1000 milliLiter(s) (100 mL/Hr) IV Continuous <Continuous>  dextrose 50% Injectable 25 Gram(s) IV Push once  dextrose 50% Injectable 12.5 Gram(s) IV Push once  dextrose 50% Injectable 25 Gram(s) IV Push once  digoxin     Tablet 125 MICROGram(s) Oral daily  famotidine    Tablet 20 milliGRAM(s) Oral daily  ferrous    sulfate 325 milliGRAM(s) Oral daily  gabapentin 300 milliGRAM(s) Oral three times a day  glucagon  Injectable 1 milliGRAM(s) IntraMuscular once  insulin lispro (ADMELOG) corrective regimen sliding scale   SubCutaneous every 6 hours  melatonin 5 milliGRAM(s) Oral at bedtime  metoprolol succinate  milliGRAM(s) Oral every 12 hours  midodrine. 15 milliGRAM(s) Oral three times a day  naloxegol 25 milliGRAM(s) Oral daily  oxybutynin XL 10 milliGRAM(s) Oral at bedtime  pantoprazole    Tablet 40 milliGRAM(s) Oral before breakfast  piperacillin/tazobactam IVPB.. 3.375 Gram(s) IV Intermittent every 8 hours  polyethylene glycol 3350 17 Gram(s) Oral daily  senna 2 Tablet(s) Oral at bedtime  sucralfate 1 Gram(s) Oral two times a day  tiotropium 2.5 MICROgram(s)/olodaterol 2.5 MICROgram(s) (STIOLTO) Inhaler 2 Puff(s) Inhalation daily    MEDICATIONS  (PRN):  dextrose Oral Gel 15 Gram(s) Oral once PRN Blood Glucose LESS THAN 70 milliGRAM(s)/deciliter  HYDROmorphone   Tablet 2 milliGRAM(s) Oral every 4 hours PRN Severe Pain (7 - 10)  ondansetron Injectable 4 milliGRAM(s) IV Push every 8 hours PRN Nausea and/or Vomiting  oxyCODONE    IR 5 milliGRAM(s) Oral every 4 hours PRN Moderate Pain (4 - 6)  sodium chloride 0.9% lock flush 10 milliLiter(s) IV Push every 1 hour PRN Pre/post blood products, medications, blood draw, and to maintain line patency  traMADol 50 milliGRAM(s) Oral every 4 hours PRN Mild Pain (1 - 3)    Allergies    fish (Hives)  ertapenem (Blisters; Rash)    Intolerances      Vital Signs Last 24 Hrs  T(C): 36.8 (05 Nov 2024 05:12), Max: 36.9 (04 Nov 2024 20:11)  T(F): 98.2 (05 Nov 2024 05:12), Max: 98.5 (04 Nov 2024 20:11)  HR: 64 (05 Nov 2024 05:12) (62 - 65)  BP: 151/75 (05 Nov 2024 05:12) (115/67 - 164/82)  BP(mean): --  RR: 18 (05 Nov 2024 05:12) (15 - 18)  SpO2: 92% (05 Nov 2024 05:12) (92% - 97%)    Parameters below as of 05 Nov 2024 05:12  Patient On (Oxygen Delivery Method): room air      I&O's Summary    04 Nov 2024 07:01  -  05 Nov 2024 07:00  --------------------------------------------------------  IN: 200 mL / OUT: 1200 mL / NET: -1000 mL    tele: off   Physical exam:  Constitutional: NAD, alert and oriented x 3  Lungs:  Non-labored, breath sounds are clear bilaterally, No wheezing, rales or rhonchi  Cardiovascular: IRRR.  S1 and S2 positive.  + murmur   Gastrointestinal: Bowel Sounds present, soft, nontender.   Lymph:no edema. No cervical lymphadenopathy.  Neurological: Alert, no focal deficits  Skin: R foot dressing c/d/i   Psych:  Mood & affect appropriate.    LABS: All Labs Reviewed:                        9.9    8.71  )-----------( 424      ( 04 Nov 2024 07:55 )             32.1                         10.1   10.33 )-----------( 473      ( 03 Nov 2024 08:00 )             32.5     04 Nov 2024 07:55    139    |  105    |  9      ----------------------------<  200    3.5     |  30     |  0.87   03 Nov 2024 08:00    141    |  103    |  8      ----------------------------<  165    3.7     |  28     |  0.79     Ca    9.5        04 Nov 2024 07:55  Ca    8.9        03 Nov 2024 08:00  Mg     2.4       04 Nov 2024 07:55    TPro  6.2    /  Alb  2.2    /  TBili  0.3    /  DBili  x      /  AST  13     /  ALT  13     /  AlkPhos  92     03 Nov 2024 08:00          12 Lead ECG:   Ventricular Rate 78 BPM    QRS Duration 74 ms    Q-T Interval 366 ms    QTC Calculation(Bazett) 417 ms    R Axis -24 degrees    T Axis 135 degrees    Diagnosis Line Atrial fibrillation  Low voltage QRS  Nonspecific T wave abnormality  Abnormal ECG  Confirmed by darien France ([id number]) on 10/30/2024 2:34:23 PM (10-30-24 @ 13:02)       EXAM:  ECHO TTE WO CON COMP W DOPPLR         PROCEDURE DATE:  12/24/2019        INTERPRETATION:  INDICATION: Coronary artery disease    Blood Pressure 117/71    Height 187     Weight 93       BSA 2.2    Dimensions:    LA 4.2       Normal Values: 2.0 - 4.0 cm    Ao 4.0        Normal Values: 2.0 - 3.8 cm  SEPTUM 1.6       Normal Values: 0.6 - 1.2 cm  PWT 1.6       Normal Values: 0.6 - 1.1 cm  LVIDd 5.8         Normal Values: 3.0 - 5.6 cm  LVIDs 3.2         Normal Values: 1.8 - 4.0 cm    Derived Variables:  LVMI g/m2  RWT  Fractional Short  Ejection Fraction    Doppler Peak v. AoV= (m/sec)    OBSERVATIONS:    Mitral Valve: normal, mild MR.  Aortic Valve/Aorta: normal trileaflet aortic valve.  Tricuspid Valve: normal with trace TR.  Pulmonic Valve: normal  Left Atrium: Enlarged  Right Atrium: normal  Left Ventricle: Severe concentric LVH with normal systolic function, estimated LVEF of 65%.  Right Ventricle: normal size and systolic function.  Pericardium/Pleura: no significant pleural effusion, no significant pericardial effusion.  Pulmonary/RV Pressure: Inadequate TR jet to estimate PA systolic pressure  LV Diastolic Function: Grade 2diastolic dysfunction.    Severe concentric LVH, and mild LV enlargement, with normal systolic function, estimated LVEF of 65%. Normal right ventricular size and systolic function. Grade 2diastolic dysfunction. Left atrial enlargement The aortic root is normal in size. The mitral valve is structurally normal, mild MR. The aortic valve is trileaflet without stenosis or insufficiency. Trace physiologic TR. Inadequate TR jet to estimate PA systolic pressure No significant pericardial effusion.                  JOVANNI ALVAREZ M.D., ATTENDING CARDIOLOGIST  This document has been electronically signed. Dec 25 2019 11:45AM                  [facility] --  Brittany Lutz Pannella, Patel, Savella, Goodger, Cohen  Office # [phone]    Follow Up:  cardiac optimization    Subjective/Observations: No events overnight resting comfortably in bed.  No complaints of chest pain, dyspnea, or palpitations reported. No signs of orthopnea or PND. toleraring RA.  npo since midnight for or today    REVIEW OF SYSTEMS: All other review of systems is negative unless indicated above  PAST MEDICAL & SURGICAL HISTORY:  Diabetes      Diabetes mellitus with no complication      Afib      Hypertension      BPH (benign prostatic hyperplasia)      Perforated gastric ulcer  s/p emergent ex-lap omentopexy and plication 6/2019      Pulmonary embolism      History of non-ST elevation myocardial infarction (NSTEMI)      Osteomyelitis  s/p debridement      CAD S/P percutaneous coronary angioplasty      Cerebrovascular accident      H/O abdominal surgery      Perforated gastric ulcer      Traumatic amputation of left foot, initial encounter        MEDICATIONS  (STANDING):  aMIOdarone    Tablet 200 milliGRAM(s) Oral daily  ascorbic acid 500 milliGRAM(s) Oral daily  aspirin  chewable 81 milliGRAM(s) Oral daily  atorvastatin 40 milliGRAM(s) Oral at bedtime  cetirizine 10 milliGRAM(s) Oral daily  chlorhexidine 2% Cloths 1 Application(s) Topical <User Schedule>  dextrose 5%. 1000 milliLiter(s) (100 mL/Hr) IV Continuous <Continuous>  dextrose 5%. 1000 milliLiter(s) (50 mL/Hr) IV Continuous <Continuous>  dextrose 5%. 1000 milliLiter(s) (50 mL/Hr) IV Continuous <Continuous>  dextrose 5%. 1000 milliLiter(s) (100 mL/Hr) IV Continuous <Continuous>  dextrose 50% Injectable 25 Gram(s) IV Push once  dextrose 50% Injectable 12.5 Gram(s) IV Push once  dextrose 50% Injectable 25 Gram(s) IV Push once  digoxin     Tablet 125 MICROGram(s) Oral daily  famotidine    Tablet 20 milliGRAM(s) Oral daily  ferrous    sulfate 325 milliGRAM(s) Oral daily  gabapentin 300 milliGRAM(s) Oral three times a day  glucagon  Injectable 1 milliGRAM(s) IntraMuscular once  insulin lispro (ADMELOG) corrective regimen sliding scale   SubCutaneous every 6 hours  melatonin 5 milliGRAM(s) Oral at bedtime  metoprolol succinate  milliGRAM(s) Oral every 12 hours  midodrine. 15 milliGRAM(s) Oral three times a day  naloxegol 25 milliGRAM(s) Oral daily  oxybutynin XL 10 milliGRAM(s) Oral at bedtime  pantoprazole    Tablet 40 milliGRAM(s) Oral before breakfast  piperacillin/tazobactam IVPB.. 3.375 Gram(s) IV Intermittent every 8 hours  polyethylene glycol 3350 17 Gram(s) Oral daily  senna 2 Tablet(s) Oral at bedtime  sucralfate 1 Gram(s) Oral two times a day  tiotropium 2.5 MICROgram(s)/olodaterol 2.5 MICROgram(s) (STIOLTO) Inhaler 2 Puff(s) Inhalation daily    MEDICATIONS  (PRN):  dextrose Oral Gel 15 Gram(s) Oral once PRN Blood Glucose LESS THAN 70 milliGRAM(s)/deciliter  HYDROmorphone   Tablet 2 milliGRAM(s) Oral every 4 hours PRN Severe Pain (7 - 10)  ondansetron Injectable 4 milliGRAM(s) IV Push every 8 hours PRN Nausea and/or Vomiting  oxyCODONE    IR 5 milliGRAM(s) Oral every 4 hours PRN Moderate Pain (4 - 6)  sodium chloride 0.9% lock flush 10 milliLiter(s) IV Push every 1 hour PRN Pre/post blood products, medications, blood draw, and to maintain line patency  traMADol 50 milliGRAM(s) Oral every 4 hours PRN Mild Pain (1 - 3)    Allergies    fish (Hives)  ertapenem (Blisters; Rash)    Intolerances      Vital Signs Last 24 Hrs  T(C): 36.8 (05 Nov 2024 05:12), Max: 36.9 (04 Nov 2024 20:11)  T(F): 98.2 (05 Nov 2024 05:12), Max: 98.5 (04 Nov 2024 20:11)  HR: 64 (05 Nov 2024 05:12) (62 - 65)  BP: 151/75 (05 Nov 2024 05:12) (115/67 - 164/82)  BP(mean): --  RR: 18 (05 Nov 2024 05:12) (15 - 18)  SpO2: 92% (05 Nov 2024 05:12) (92% - 97%)    Parameters below as of 05 Nov 2024 05:12  Patient On (Oxygen Delivery Method): room air      I&O's Summary    04 Nov 2024 07:01  -  05 Nov 2024 07:00  --------------------------------------------------------  IN: 200 mL / OUT: 1200 mL / NET: -1000 mL    tele: off   Physical exam:  Constitutional: NAD, alert and oriented x 3  Lungs:  Non-labored, breath sounds are clear bilaterally, No wheezing, rales or rhonchi  Cardiovascular: IRRR.  S1 and S2 positive.  + murmur   Gastrointestinal: Bowel Sounds present, soft, nontender.   Lymph:no edema. No cervical lymphadenopathy.  Neurological: Alert, no focal deficits  Skin: R foot dressing c/d/i   Psych:  Mood & affect appropriate.    LABS: All Labs Reviewed:                        9.9    8.71  )-----------( 424      ( 04 Nov 2024 07:55 )             32.1                         10.1   10.33 )-----------( 473      ( 03 Nov 2024 08:00 )             32.5     04 Nov 2024 07:55    139    |  105    |  9      ----------------------------<  200    3.5     |  30     |  0.87   03 Nov 2024 08:00    141    |  103    |  8      ----------------------------<  165    3.7     |  28     |  0.79     Ca    9.5        04 Nov 2024 07:55  Ca    8.9        03 Nov 2024 08:00  Mg     2.4       04 Nov 2024 07:55    TPro  6.2    /  Alb  2.2    /  TBili  0.3    /  DBili  x      /  AST  13     /  ALT  13     /  AlkPhos  92     03 Nov 2024 08:00          12 Lead ECG:   Ventricular Rate 78 BPM    QRS Duration 74 ms    Q-T Interval 366 ms    QTC Calculation(Bazett) 417 ms    R Axis -24 degrees    T Axis 135 degrees    Diagnosis Line Atrial fibrillation  Low voltage QRS  Nonspecific T wave abnormality  Abnormal ECG  Confirmed by darien France ([id number]) on 10/30/2024 2:34:23 PM (10-30-24 @ 13:02)       EXAM:  ECHO TTE WO CON COMP W DOPPLR         PROCEDURE DATE:  12/24/2019        INTERPRETATION:  INDICATION: Coronary artery disease    Blood Pressure 117/71    Height 187     Weight 93       BSA 2.2    Dimensions:    LA 4.2       Normal Values: 2.0 - 4.0 cm    Ao 4.0        Normal Values: 2.0 - 3.8 cm  SEPTUM 1.6       Normal Values: 0.6 - 1.2 cm  PWT 1.6       Normal Values: 0.6 - 1.1 cm  LVIDd 5.8         Normal Values: 3.0 - 5.6 cm  LVIDs 3.2         Normal Values: 1.8 - 4.0 cm    Derived Variables:  LVMI g/m2  RWT  Fractional Short  Ejection Fraction    Doppler Peak v. AoV= (m/sec)    OBSERVATIONS:    Mitral Valve: normal, mild MR.  Aortic Valve/Aorta: normal trileaflet aortic valve.  Tricuspid Valve: normal with trace TR.  Pulmonic Valve: normal  Left Atrium: Enlarged  Right Atrium: normal  Left Ventricle: Severe concentric LVH with normal systolic function, estimated LVEF of 65%.  Right Ventricle: normal size and systolic function.  Pericardium/Pleura: no significant pleural effusion, no significant pericardial effusion.  Pulmonary/RV Pressure: Inadequate TR jet to estimate PA systolic pressure  LV Diastolic Function: Grade 2diastolic dysfunction.    Severe concentric LVH, and mild LV enlargement, with normal systolic function, estimated LVEF of 65%. Normal right ventricular size and systolic function. Grade 2diastolic dysfunction. Left atrial enlargement The aortic root is normal in size. The mitral valve is structurally normal, mild MR. The aortic valve is trileaflet without stenosis or insufficiency. Trace physiologic TR. Inadequate TR jet to estimate PA systolic pressure No significant pericardial effusion.                  JOVANNI ALVAREZ M.D., ATTENDING CARDIOLOGIST  This document has been electronically signed. Dec 25 2019 11:45AM

## 2024-11-05 NOTE — CASE MANAGEMENT PROGRESS NOTE - NSCMPROGRESSNOTE_GEN_ALL_CORE
Patient discussed in AM rounds. Remains medically acute, for OR today for I&D R plantar midfoot; on IV zosyn. +PICC placed on 11/4 for 6 weeks abx. Anticipated DC plan for return to [facility] when medically stable; SW following. CM will remains available.

## 2024-11-06 LAB
A1C WITH ESTIMATED AVERAGE GLUCOSE RESULT: 6.1 % — HIGH (ref 4–5.6)
ANION GAP SERPL CALC-SCNC: 5 MMOL/L — SIGNIFICANT CHANGE UP (ref 5–17)
BUN SERPL-MCNC: 11 MG/DL — SIGNIFICANT CHANGE UP (ref 7–23)
CALCIUM SERPL-MCNC: 9.4 MG/DL — SIGNIFICANT CHANGE UP (ref 8.5–10.1)
CHLORIDE SERPL-SCNC: 105 MMOL/L — SIGNIFICANT CHANGE UP (ref 96–108)
CO2 SERPL-SCNC: 27 MMOL/L — SIGNIFICANT CHANGE UP (ref 22–31)
CREAT SERPL-MCNC: 0.94 MG/DL — SIGNIFICANT CHANGE UP (ref 0.5–1.3)
EGFR: 95 ML/MIN/1.73M2 — SIGNIFICANT CHANGE UP
ESTIMATED AVERAGE GLUCOSE: 128 MG/DL — HIGH (ref 68–114)
GLUCOSE BLDC GLUCOMTR-MCNC: 204 MG/DL — HIGH (ref 70–99)
GLUCOSE BLDC GLUCOMTR-MCNC: 222 MG/DL — HIGH (ref 70–99)
GLUCOSE BLDC GLUCOMTR-MCNC: 284 MG/DL — HIGH (ref 70–99)
GLUCOSE BLDC GLUCOMTR-MCNC: 286 MG/DL — HIGH (ref 70–99)
GLUCOSE SERPL-MCNC: 185 MG/DL — HIGH (ref 70–99)
GRAM STN FLD: ABNORMAL
HCT VFR BLD CALC: 31.2 % — LOW (ref 39–50)
HGB BLD-MCNC: 9.8 G/DL — LOW (ref 13–17)
MCHC RBC-ENTMCNC: 29.4 PG — SIGNIFICANT CHANGE UP (ref 27–34)
MCHC RBC-ENTMCNC: 31.4 G/DL — LOW (ref 32–36)
MCV RBC AUTO: 93.7 FL — SIGNIFICANT CHANGE UP (ref 80–100)
NRBC # BLD: 0 /100 WBCS — SIGNIFICANT CHANGE UP (ref 0–0)
PLATELET # BLD AUTO: 405 K/UL — HIGH (ref 150–400)
POTASSIUM SERPL-MCNC: 3.7 MMOL/L — SIGNIFICANT CHANGE UP (ref 3.5–5.3)
POTASSIUM SERPL-SCNC: 3.7 MMOL/L — SIGNIFICANT CHANGE UP (ref 3.5–5.3)
RBC # BLD: 3.33 M/UL — LOW (ref 4.2–5.8)
RBC # FLD: 15.8 % — HIGH (ref 10.3–14.5)
SODIUM SERPL-SCNC: 137 MMOL/L — SIGNIFICANT CHANGE UP (ref 135–145)
WBC # BLD: 12.15 K/UL — HIGH (ref 3.8–10.5)
WBC # FLD AUTO: 12.15 K/UL — HIGH (ref 3.8–10.5)

## 2024-11-06 PROCEDURE — 99232 SBSQ HOSP IP/OBS MODERATE 35: CPT

## 2024-11-06 PROCEDURE — 99024 POSTOP FOLLOW-UP VISIT: CPT

## 2024-11-06 RX ORDER — APIXABAN 5 MG/1
5 TABLET, FILM COATED ORAL EVERY 12 HOURS
Refills: 0 | Status: DISCONTINUED | OUTPATIENT
Start: 2024-11-06 | End: 2024-11-07

## 2024-11-06 RX ORDER — CHLORHEXIDINE GLUCONATE 40 MG/ML
1 SOLUTION TOPICAL
Refills: 0 | Status: DISCONTINUED | OUTPATIENT
Start: 2024-11-06 | End: 2024-11-06

## 2024-11-06 RX ADMIN — SUCRALFATE 1 GRAM(S): 1 SUSPENSION ORAL at 16:58

## 2024-11-06 RX ADMIN — CETIRIZINE HCL 10 MILLIGRAM(S): 10 TABLET ORAL at 11:08

## 2024-11-06 RX ADMIN — Medication 500 MILLIGRAM(S): at 11:08

## 2024-11-06 RX ADMIN — MIDODRINE HYDROCHLORIDE 15 MILLIGRAM(S): 2.5 TABLET ORAL at 11:08

## 2024-11-06 RX ADMIN — Medication 0.2 MILLIGRAM(S): at 17:58

## 2024-11-06 RX ADMIN — TIOTROPIUM BROMIDE AND OLODATEROL 2 PUFF(S): 3.124; 2.736 SPRAY, METERED RESPIRATORY (INHALATION) at 06:19

## 2024-11-06 RX ADMIN — Medication 0.2 MILLIGRAM(S): at 22:45

## 2024-11-06 RX ADMIN — Medication 0.2 MILLIGRAM(S): at 12:09

## 2024-11-06 RX ADMIN — PIPERACILLIN AND TAZOBACTAM 25 GRAM(S): .5; 4 INJECTION, POWDER, LYOPHILIZED, FOR SOLUTION INTRAVENOUS at 06:19

## 2024-11-06 RX ADMIN — Medication 1: at 22:47

## 2024-11-06 RX ADMIN — Medication 2: at 08:10

## 2024-11-06 RX ADMIN — PANTOPRAZOLE SODIUM 40 MILLIGRAM(S): 40 TABLET, DELAYED RELEASE ORAL at 06:20

## 2024-11-06 RX ADMIN — MIDODRINE HYDROCHLORIDE 15 MILLIGRAM(S): 2.5 TABLET ORAL at 16:57

## 2024-11-06 RX ADMIN — Medication 200 MILLIGRAM(S): at 06:20

## 2024-11-06 RX ADMIN — SUCRALFATE 1 GRAM(S): 1 SUSPENSION ORAL at 06:20

## 2024-11-06 RX ADMIN — MIDODRINE HYDROCHLORIDE 15 MILLIGRAM(S): 2.5 TABLET ORAL at 06:20

## 2024-11-06 RX ADMIN — APIXABAN 5 MILLIGRAM(S): 5 TABLET, FILM COATED ORAL at 07:35

## 2024-11-06 RX ADMIN — Medication 81 MILLIGRAM(S): at 11:08

## 2024-11-06 RX ADMIN — Medication 100 MILLIGRAM(S): at 16:58

## 2024-11-06 RX ADMIN — Medication 0.2 MILLIGRAM(S): at 23:45

## 2024-11-06 RX ADMIN — Medication 3: at 16:51

## 2024-11-06 RX ADMIN — Medication 0.2 MILLIGRAM(S): at 06:50

## 2024-11-06 RX ADMIN — Medication 5 MILLIGRAM(S): at 22:45

## 2024-11-06 RX ADMIN — GABAPENTIN 300 MILLIGRAM(S): 300 CAPSULE ORAL at 22:45

## 2024-11-06 RX ADMIN — PIPERACILLIN AND TAZOBACTAM 25 GRAM(S): .5; 4 INJECTION, POWDER, LYOPHILIZED, FOR SOLUTION INTRAVENOUS at 22:45

## 2024-11-06 RX ADMIN — Medication 125 MICROGRAM(S): at 06:20

## 2024-11-06 RX ADMIN — Medication 100 MILLIGRAM(S): at 06:19

## 2024-11-06 RX ADMIN — POLYETHYLENE GLYCOL 3350 17 GRAM(S): 17 POWDER, FOR SOLUTION ORAL at 11:08

## 2024-11-06 RX ADMIN — GABAPENTIN 300 MILLIGRAM(S): 300 CAPSULE ORAL at 13:05

## 2024-11-06 RX ADMIN — PIPERACILLIN AND TAZOBACTAM 25 GRAM(S): .5; 4 INJECTION, POWDER, LYOPHILIZED, FOR SOLUTION INTRAVENOUS at 13:05

## 2024-11-06 RX ADMIN — Medication 40 MILLIGRAM(S): at 22:45

## 2024-11-06 RX ADMIN — Medication 0.2 MILLIGRAM(S): at 16:58

## 2024-11-06 RX ADMIN — Medication 2: at 11:16

## 2024-11-06 RX ADMIN — GABAPENTIN 300 MILLIGRAM(S): 300 CAPSULE ORAL at 06:20

## 2024-11-06 RX ADMIN — APIXABAN 5 MILLIGRAM(S): 5 TABLET, FILM COATED ORAL at 16:58

## 2024-11-06 RX ADMIN — Medication 0.2 MILLIGRAM(S): at 11:09

## 2024-11-06 RX ADMIN — NALOXEGOL OXALATE 25 MILLIGRAM(S): 12.5 TABLET, FILM COATED ORAL at 11:08

## 2024-11-06 RX ADMIN — Medication 0.2 MILLIGRAM(S): at 06:20

## 2024-11-06 RX ADMIN — CHLORHEXIDINE GLUCONATE 1 APPLICATION(S): 40 SOLUTION TOPICAL at 06:28

## 2024-11-06 RX ADMIN — Medication 325 MILLIGRAM(S): at 11:08

## 2024-11-06 RX ADMIN — FAMOTIDINE 20 MILLIGRAM(S): 10 INJECTION INTRAVENOUS at 11:08

## 2024-11-06 NOTE — PROGRESS NOTE ADULT - PROBLEM SELECTOR PLAN 2
hold metformin  accuchecks w coverage

## 2024-11-06 NOTE — PROGRESS NOTE ADULT - SUBJECTIVE AND OBJECTIVE BOX
Chief Complaint   Patient presents with   • Follow-up     3-4 weeks           Edison Montes De Oca is a 68 year old male who presents to clinic for a routine follow up s/p cardiac catheterization resulting in drug eluting stents to the ostial and distal Circumflex artery. Patient has a PMH of coronary artery disease s/p CABG x 6 in 1996, ischemic cardiomyopathy, atrial fibrillation with RVR s/p DCCV in 2011 and 1/2018, AAA (follows with Dr Amezcua), and COPD. Since stenting patient denies any chest pain, shortness of breath or dyspnea.   Patient presents with interest in undergoing left knee surgery due to no improvement with Orthovisc injections. Patient would like to \"get off of some of his cardiac medications\".        Patient Active Problem List   Diagnosis   • DJD (degenerative joint disease) of knee   • Coronary atherosclerosis of unspecified type of vessel, native or graft   • Other and unspecified hyperlipidemia   • Atrial fibrillation (CMS/HCC)   • Coronary artery disease   • COPD (chronic obstructive pulmonary disease) (CMS/HCC)   • AAA (abdominal aortic aneurysm) without rupture (CMS/HCC)   • HBP (high blood pressure)   • S/P PLIF L3-4   • Hyperlipidemia   • Osteoarthritis of spine with radiculopathy, lumbar region   • Primary osteoarthritis of right shoulder   • Hx of CABG   • Hypotestosteronism   • Anticoagulant long-term use - Eliquis   • Tobacco abuse       Medications: reviewed in EPIC.    ALLERGIES:   Allergen Reactions   • Baclofen [Enovarx-Baclofen] Other (See Comments)     Patient states sx were confusion        Past Surgical History:   Procedure Laterality Date   • BACK SURGERY     • CARDIAC CATHERIZATION  01/01/2012    left   • CARDIAC SURGERY  1996    6 vessel CABG   • CDL CATH POSS PTCA  04/06/2018   • ELECTRICAL CARDIOVERSION  2011   • EYE SURGERY      Right Lasik   • INCISION AND DRAINAGE PERIRECTAL ABSCESS  2010    Dr Ortiz   • JOINT REPLACEMENT  2004    Partial right knee   • JOINT  REPLACEMENT  2012    Total right knee   • JOINT REPLACEMENT  2/18/2013    Redo Right TKR   • LUMBAR FUSION  2015    Dr Pierre.    • VENESSA/CARDIOVERSION  01/25/2018       Family History   Problem Relation Age of Onset   • Heart disease Mother    • Heart disease Father    • Heart disease Brother 40     CABG       Social History     Social History   • Marital status:      Spouse name: N/A   • Number of children: N/A   • Years of education: N/A     Occupational History   • Not on file.     Social History Main Topics   • Smoking status: Current Every Day Smoker     Packs/day: 1.00     Years: 50.00     Types: Cigarettes   • Smokeless tobacco: Never Used   • Alcohol use No   • Drug use: No   • Sexual activity: No     Other Topics Concern   •  Service Yes     [employer]   • Blood Transfusions No   • Caffeine Concern No   • Occupational Exposure No   • Hobby Hazards No   • Sleep Concern No   • Stress Concern No   • Weight Concern No   • Special Diet No   • Back Care Yes   • Exercise Yes   • Bike Helmet No   • Seat Belt Yes   • Self-Exams No     Social History Narrative   • No narrative on file               .  Visit Vitals  /72 (BP Location: [facility], Patient Position: Sitting, Cuff Size: Large Adult)   Pulse 54   Ht 6' 1\" (1.854 m)   Wt 127.3 kg   BMI 37.01 kg/m²       Constitutional:  Well developed, well nourished, no acute distress, nontoxic appearance.  Eyes:  Conjunctivae normal.   HENT:  Atraumatic, external ears normal, nose normal,  Neck-no JVD (jugular venous distension), no carotid bruit, no thyromegaly.  Respiratory:  No respiratory distress, decreased breath sounds with wheezing bilaterally.   Cardiovascular:  Normal rate, normal rhythm, no murmurs, no gallops, no rubs. 1+ PT pulses bilaterally.   Gastrointestinal:  Soft, nondistended, normal bowel sounds.    Musculoskeletal:  No edema, no tenderness, no deformities. Back- no tenderness  Integument:  Well hydrated, no rash.   Neurologic:   Alert and oriented x 3, no focal deficits noted.  Psychiatric:  Speech and behavior appropriate.     Labs pertinent to today's visit:   CHOLESTEROL (mg/dL)   Date Value   04/06/2018 178     HDL (mg/dL)   Date Value   04/06/2018 26 (L)     CHOL/HDL (no units)   Date Value   04/06/2018 6.8 (H)     TRIGLYCERIDE (mg/dL)   Date Value   04/06/2018 244 (H)     CALCULATED LDL (mg/dL)   Date Value   04/06/2018 103       CARDIAC DIAGNOSTICS:    NM Stress test 2/2013  LEFT VENTRICULAR EJECTION FRACTION:  52 % (nl>50%)  IMPRESSION:  Anterolateral wall infarct  No ischemia    Limited echo 3/2017  Technically difficult study.  Left ventricular enlargement with mild segmental LV systolic dysfunction. EF approx 45%. The inferior, infero-lateral and basal infero-septal walls are akinetic.  Right ventricular enlargement with mildly decreased RV function. Unremarkable valvular structures    EP Case 1/2018  IMPRESSION:  Successful DCCV using 360 joules.     VENESSA 1/2018  VENESSA performed to facilitate cardioversion  No left atrial, right atrial, left atrial appendage thrombus  Decreased flow velocities in left atrial appendage  Significantly decreased LV systolic function in limited views  No pericardial effusion    NM Stress test 3/2018  1.  Abnormal myocardial perfusion scans suggesting anterolateral infarction  and a small area of moderate lateral wall ischemia and a small area of mild  inferior ischemia  2.  Mildly reduced resting and post-dobutamine left ventricular systolic  function.  3. Cardiac Risk Assessment: Intermediate due to the reduced ejection  fractions   4. Images from a previous study of February 12, 2013 are not available for  direct comparison.    Echo 3/2018  Mild anterior hypokinesia; overallL systolic function ismildly reduced.  Mildly increased left ventricular wall thickness.  Normal diastolic function.  Mildly decreased right ventricular systolic function.  Moderately increased left atrial volume 46.1  ml/m².  Aortic valve sclerosis without stenosis or regurgitation.        1. Coronary artery disease s/p recent stenting to the ostial and distal Circumflex (4/6/2018) and a hx of MI with CABG x 6 in 1996 (Dr Rodriguez). On asa/atenolol/plavix.   2. Cardiomyopathy: EF has improved to 51% on echo obtained 3/2018. Patient denies does not demonstrate any over symptoms of heart failure.   3. Paroxsymal atrial fibrillation with RVR s/p DCCV in 1/2018. EKG in office demonstrates Sinus bradycardia. On amiodarone/Eliquis, follows with Dr Eisenberg   4. Abdominal aortic aneurysm with involvement of iliacs, measuring at 4 cm. Follows with Dr Amezcua.   5. Hypertension, controlled on atenolol/lasix   6. Dyslipidemia,  on simvastatin   7. Tobacco abuse: he is trying to quit, smokes 0.5 packs a day. Denies claudication.   8. Bradycardia   9. COPD     Plan:   In 1 week, patient may stop his Aspirin. Patient will stop Lasix today. Will restart lasix if patient begins demonstrating edema. Will stop Atenolol due to bradycardia. Patient is to remain on Plavix for a minimum of 1 year; and was advised to wait that 1 year prior to undergoing knee surgery.      No Follow-up on file. or sooner if there are new or worsening symptoms.     On 4/26/2018, Mary VIVAS scribed the services personally performed by Brandt Farrar MD      I attest that I have examined the patient, reviewed the pertinent diagnostic studies and medical data, and developed the treatment plan. I have made appropriate addendums and agree with the documentation stated above.         Brandt Farrar MD, FAC, [facility]  Clinical Adjunct   Department of Medicine  [facility] [employer] [facility] [employer]  [facility]                  54y year old Male seen at [facility] 1EAS 112 W1 s/p incision and drainage of abscess to the right foot DOS 11/5/24.   Denies any fever, chills, nausea, vomiting, chest pain, shortness of breath, or calf pain at this time.    Allergies    fish (Hives)  ertapenem (Blisters; Rash)    Intolerances        MEDICATIONS  (STANDING):  aMIOdarone    Tablet 200 milliGRAM(s) Oral daily  apixaban 5 milliGRAM(s) Oral every 12 hours  ascorbic acid 500 milliGRAM(s) Oral daily  aspirin  chewable 81 milliGRAM(s) Oral daily  atorvastatin 40 milliGRAM(s) Oral at bedtime  cetirizine 10 milliGRAM(s) Oral daily  chlorhexidine 2% Cloths 1 Application(s) Topical <User Schedule>  dextrose 5%. 1000 milliLiter(s) (50 mL/Hr) IV Continuous <Continuous>  dextrose 5%. 1000 milliLiter(s) (100 mL/Hr) IV Continuous <Continuous>  dextrose 50% Injectable 12.5 Gram(s) IV Push once  dextrose 50% Injectable 25 Gram(s) IV Push once  dextrose 50% Injectable 25 Gram(s) IV Push once  digoxin     Tablet 125 MICROGram(s) Oral daily  famotidine    Tablet 20 milliGRAM(s) Oral daily  ferrous    sulfate 325 milliGRAM(s) Oral daily  gabapentin 300 milliGRAM(s) Oral three times a day  glucagon  Injectable 1 milliGRAM(s) IntraMuscular once  insulin lispro (ADMELOG) corrective regimen sliding scale   SubCutaneous at bedtime  insulin lispro (ADMELOG) corrective regimen sliding scale   SubCutaneous three times a day before meals  melatonin 5 milliGRAM(s) Oral at bedtime  metoprolol succinate  milliGRAM(s) Oral every 12 hours  midodrine. 15 milliGRAM(s) Oral three times a day  naloxegol 25 milliGRAM(s) Oral daily  pantoprazole    Tablet 40 milliGRAM(s) Oral before breakfast  piperacillin/tazobactam IVPB.. 3.375 Gram(s) IV Intermittent every 8 hours  polyethylene glycol 3350 17 Gram(s) Oral daily  senna 2 Tablet(s) Oral at bedtime  sucralfate 1 Gram(s) Oral two times a day  tiotropium 2.5 MICROgram(s)/olodaterol 2.5 MICROgram(s) (STIOLTO) Inhaler 2 Puff(s) Inhalation daily    MEDICATIONS  (PRN):  dextrose Oral Gel 15 Gram(s) Oral once PRN Blood Glucose LESS THAN 70 milliGRAM(s)/deciliter  HYDROmorphone  Injectable 0.2 milliGRAM(s) IV Push every 4 hours PRN Severe Pain (7 - 10)  ondansetron Injectable 4 milliGRAM(s) IV Push every 8 hours PRN Nausea and/or Vomiting  oxyCODONE    IR 5 milliGRAM(s) Oral every 4 hours PRN Moderate Pain (4 - 6)  sodium chloride 0.9% lock flush 10 milliLiter(s) IV Push every 1 hour PRN Pre/post blood products, medications, blood draw, and to maintain line patency  traMADol 50 milliGRAM(s) Oral every 4 hours PRN Mild Pain (1 - 3)      Vital Signs Last 24 Hrs  T(C): 37.4 (06 Nov 2024 11:31), Max: 37.4 (06 Nov 2024 11:31)  T(F): 99.3 (06 Nov 2024 11:31), Max: 99.3 (06 Nov 2024 11:31)  HR: 91 (06 Nov 2024 16:53) (60 - 91)  BP: 149/77 (06 Nov 2024 16:53) (127/71 - 149/77)  BP(mean): --  RR: 17 (06 Nov 2024 11:31) (17 - 18)  SpO2: 94% (06 Nov 2024 11:31) (94% - 98%)    Parameters below as of 06 Nov 2024 11:31  Patient On (Oxygen Delivery Method): room air          PHYSICAL EXAM:  Vascular: DP & PT non palpable bilaterally, Capillary refill 3 seconds, Digital hair absent bilaterally  Neurological: Light touch sensation diminished bilaterally  Musculoskeletal: 3/5 strength in all quadrants to the right and 4/5 to the left , s/p partial calcanectomy of the right heel   Dermatological:  s/p incision and drainage to  the right heel wound now measuring   16.0 cm x 7.6cm x 1.0cm and tracking distally with  granular wound bed, no probe to bone, no periwound erythema, no fluctuance, no malodor, no proximal streaking at this time      CBC Full  -  ( 06 Nov 2024 08:15 )  WBC Count : 12.15 K/uL  RBC Count : 3.33 M/uL  Hemoglobin : 9.8 g/dL  Hematocrit : 31.2 %  Platelet Count - Automated : 405 K/uL  Mean Cell Volume : 93.7 fl  Mean Cell Hemoglobin : 29.4 pg  Mean Cell Hemoglobin Concentration : 31.4 g/dL  Auto Neutrophil # : x  Auto Lymphocyte # : x  Auto Monocyte # : x  Auto Eosinophil # : x  Auto Basophil # : x  Auto Neutrophil % : x  Auto Lymphocyte % : x  Auto Monocyte % : x  Auto Eosinophil % : x  Auto Basophil % : x      ----------CHEM PANEL----------          Culture - Tissue with Gram Stain (collected 05 Nov 2024 12:00)  Source: Tissue  Gram Stain (05 Nov 2024 23:35):    Rare polymorphonuclear leukocytes per low power field    No organisms seen per oil power field        Imaging: ----------

## 2024-11-06 NOTE — PROGRESS NOTE ADULT - SUBJECTIVE AND OBJECTIVE BOX
[facility] -- Brittany Lutz, Reynaldo Sweeney Savella, Goodger, Cohen: Office # [phone]    Follow Up:  Cardiac optimization    Subjective/Observations: Patient awake, alert, resting in bed. Denies chest pain, palpitations and dizziness. Denies any difficulty breathing. No orthopnea and PND. Tolerating room air. S/p OR, tolerated well. Right foot DSD intact.     REVIEW OF SYSTEMS: All other review of systems are negative unless indicated above    PAST MEDICAL & SURGICAL HISTORY:  Diabetes      Diabetes mellitus with no complication      Afib      Hypertension      Hypertension      BPH (benign prostatic hyperplasia)      Perforated gastric ulcer  s/p emergent ex-lap omentopexy and plication 6/2019      Pulmonary embolism      History of non-ST elevation myocardial infarction (NSTEMI)      Osteomyelitis  s/p debridement      CAD S/P percutaneous coronary angioplasty      Cerebrovascular accident      H/O abdominal surgery      Perforated gastric ulcer      Traumatic amputation of left foot, initial encounter    MEDICATIONS  (STANDING):  aMIOdarone    Tablet 200 milliGRAM(s) Oral daily  apixaban 5 milliGRAM(s) Oral every 12 hours  ascorbic acid 500 milliGRAM(s) Oral daily  aspirin  chewable 81 milliGRAM(s) Oral daily  atorvastatin 40 milliGRAM(s) Oral at bedtime  cetirizine 10 milliGRAM(s) Oral daily  chlorhexidine 2% Cloths 1 Application(s) Topical <User Schedule>  dextrose 5%. 1000 milliLiter(s) (100 mL/Hr) IV Continuous <Continuous>  dextrose 5%. 1000 milliLiter(s) (50 mL/Hr) IV Continuous <Continuous>  dextrose 50% Injectable 25 Gram(s) IV Push once  dextrose 50% Injectable 25 Gram(s) IV Push once  dextrose 50% Injectable 12.5 Gram(s) IV Push once  digoxin     Tablet 125 MICROGram(s) Oral daily  famotidine    Tablet 20 milliGRAM(s) Oral daily  ferrous    sulfate 325 milliGRAM(s) Oral daily  gabapentin 300 milliGRAM(s) Oral three times a day  glucagon  Injectable 1 milliGRAM(s) IntraMuscular once  insulin lispro (ADMELOG) corrective regimen sliding scale   SubCutaneous three times a day before meals  insulin lispro (ADMELOG) corrective regimen sliding scale   SubCutaneous at bedtime  melatonin 5 milliGRAM(s) Oral at bedtime  metoprolol succinate  milliGRAM(s) Oral every 12 hours  midodrine. 15 milliGRAM(s) Oral three times a day  naloxegol 25 milliGRAM(s) Oral daily  pantoprazole    Tablet 40 milliGRAM(s) Oral before breakfast  piperacillin/tazobactam IVPB.. 3.375 Gram(s) IV Intermittent every 8 hours  polyethylene glycol 3350 17 Gram(s) Oral daily  senna 2 Tablet(s) Oral at bedtime  sucralfate 1 Gram(s) Oral two times a day  tiotropium 2.5 MICROgram(s)/olodaterol 2.5 MICROgram(s) (STIOLTO) Inhaler 2 Puff(s) Inhalation daily    MEDICATIONS  (PRN):  dextrose Oral Gel 15 Gram(s) Oral once PRN Blood Glucose LESS THAN 70 milliGRAM(s)/deciliter  HYDROmorphone  Injectable 0.2 milliGRAM(s) IV Push every 4 hours PRN Severe Pain (7 - 10)  ondansetron Injectable 4 milliGRAM(s) IV Push every 8 hours PRN Nausea and/or Vomiting  oxyCODONE    IR 5 milliGRAM(s) Oral every 4 hours PRN Moderate Pain (4 - 6)  sodium chloride 0.9% lock flush 10 milliLiter(s) IV Push every 1 hour PRN Pre/post blood products, medications, blood draw, and to maintain line patency  traMADol 50 milliGRAM(s) Oral every 4 hours PRN Mild Pain (1 - 3)    Allergies  fish (Hives)  ertapenem (Blisters; Rash)    Vital Signs Last 24 Hrs  T(C): 36.7 (06 Nov 2024 05:26), Max: 36.7 (05 Nov 2024 11:12)  T(F): 98.1 (06 Nov 2024 05:26), Max: 98.1 (05 Nov 2024 11:12)  HR: 70 (06 Nov 2024 05:26) (59 - 73)  BP: 127/75 (06 Nov 2024 05:26) (96/56 - 147/80)  BP(mean): --  RR: 18 (06 Nov 2024 05:26) (11 - 18)  SpO2: 94% (06 Nov 2024 05:26) (93% - 98%)    Parameters below as of 06 Nov 2024 05:26  Patient On (Oxygen Delivery Method): room air      I&O's Summary    05 Nov 2024 07:01  -  06 Nov 2024 07:00  --------------------------------------------------------  IN: 300 mL / OUT: 1500 mL / NET: -1200 mL      Weight (kg): 120.2 (11-05 @ 11:11)    TELE: Not on telemetry   PHYSICAL EXAM:  Constitutional: NAD, awake and alert  HEENT: Moist Mucous Membranes, Anicteric  Pulmonary: Non-labored, breath sounds are clear bilaterally, No wheezing, rales or rhonchi  Cardiovascular: Regular, S1 and S2, No murmurs, No rubs, gallops or clicks  Gastrointestinal:  soft, nontender, nondistended   Lymph: No peripheral edema. No lymphadenopathy.   Skin: No visible rashes Right foot DSD intact.   Psych:  Mood & affect appropriate      LABS: All Labs Reviewed:                        9.8    12.15 )-----------( 405      ( 06 Nov 2024 08:15 )             31.2                         9.9    8.71  )-----------( 424      ( 04 Nov 2024 07:55 )             32.1     06 Nov 2024 08:15    137    |  105    |  11     ----------------------------<  185    3.7     |  27     |  0.94   04 Nov 2024 07:55    139    |  105    |  9      ----------------------------<  200    3.5     |  30     |  0.87     Ca    9.4        06 Nov 2024 08:15  Ca    9.5        04 Nov 2024 07:55  Mg     2.4       04 Nov 2024 07:55         12 Lead ECG:   Ventricular Rate 78 BPM    QRS Duration 74 ms    Q-T Interval 366 ms    QTC Calculation(Bazett) 417 ms    R Axis -24 degrees    T Axis 135 degrees    Diagnosis Line Atrial fibrillation  Low voltage QRS  Nonspecific T wave abnormality  Abnormal ECG  Confirmed by darien France ([id number]) on 10/30/2024 2:34:23 PM (10-30-24 @ 13:02)      TRANSTHORACIC ECHOCARDIOGRAM REPORT  ________________________________________________________________________________                                      _______       Pt. Name:       SARAH MORRISON Study Date:    9/5/2024  MRN:            [id number]         YOB: 1970  Accession #:    [id number]        Age:           54 years  Account#:       [id number]       Gender:        M  Heart Rate:                      Height:        74.02 in (188.00 cm)  Rhythm:                          Weight:        207.23 lb (94.00 kg)  Blood Pressure: 97/60 mmHg       BSA/BMI:       2.21 m² / 26.60 kg/m²  ________________________________________________________________________________________  Referring Physician:    [id number] Hill Brizuela  Interpreting Physician: Claudette Jacobs  Primary Sonographer:    Mounika FELDMAN    CPT:               ECHO TTE WO CON COMP W DOPP - 70028.m  Indication(s):     Abnormal electrocardiogram ECG/EKG - R94.31  Procedure:         Transthoracic echocardiogram with2-D, M-mode and complete                     spectral and color flow Doppler.  Ordering Location: [facility]  Admission Status:  Inpatient  Study Information: Image quality for this study is technically difficult.    _______________________________________________________________________________________     CONCLUSIONS:      1. Technically difficult image quality.   2. Left ventricular cavity is normal in size. Left ventricular wall thickness is normal. Left ventricular systolic function is normal withan ejection fraction of 69 % by Castañeda's method of disks. There are no regional wall motion abnormalities seen.   3. There is increased LV mass and concentric hypertrophy.   4. Normal right ventricular cavity size and normal right ventricular systolic function.   5. Left atrium is severely dilated.   6. The right atrium is severely dilated.   7. There is calcification of the mitral valve annulus.   8. Mitral valve leaflets are diffusely calcified.   9. Moderate to severe mitral regurgitation.  10. Trileaflet aortic valve with normal systolic excursion. There is calcification of the aortic valve leaflets.  11. Mild tricuspid regurgitation.  12. Estimated pulmonary artery systolic pressure is 39 mmHg, consistent with borderline pulmonary hypertension.  13. The inferior vena cava is normal in size measuring 1.75 cm in diameter, (normal <2.1cm) with normal inspiratory collapse (normal >50%) consistent with normal right atrial pressure (~3, range 0-5mmHg).    ________________________________________________________________________________________  FINDINGS:     Left Ventricle:  The left ventricular cavity is normal in size. Left ventricular wall thickness is normal. Left ventricular systolic function is normal with a calculated ejection fraction of 69 % by the Castañeda's biplane method of disks. There are no regional wall motion abnormalities seen. There is increased LV mass and concentric hypertrophy. The left ventricular diastolic function is indeterminate.     Right Ventricle:  The right ventricular cavity is normal in size and right ventricular systolic function is normal.     Left Atrium:  The left atrium is severely dilated with an indexed volume of 31.95 ml/m².     Right Atrium:  The right atrium is severely dilated with an indexed volume of 26.74 ml/m².     Aortic Valve:  The aortic valve appears trileaflet with normal systolic excursion. There is calcification of the aortic valve leaflets.     Mitral Valve:  There is calcification of the mitral valve annulus. Mitral valve leaflets are diffusely calcified. There is moderate to severe mitral regurgitation.     Tricuspid Valve:  Structurally normal tricuspid valve with normal leaflet excursion. There is mild tricuspid regurgitation. Estimated pulmonary artery systolicpressure is 39 mmHg, consistent with borderline pulmonary hypertension.     Pulmonic Valve:  The pulmonic valve was not well visualized. There is trace pulmonic regurgitation.     Systemic Veins:  The inferior vena cava is normal in size measuring 1.75 cm in diameter, (normal <2.1cm) with normal inspiratory collapse (normal >50%) consistent with normal right atrial pressure (~3, range 0-5mmHg).  ____________________________________________________________________  QUANTITATIVE DATA:  Left Ventricle Measurements: (Indexed to BSA)     IVSd (2D):   1.4 cm  LVPWd (2D):  1.3 cm  LVIDd (2D):  5.4 cm  LVIDs (2D):  4.0 cm  LV Mass:     306 g  138.7 g/m²  LV Vol d, MOD A2C: 87.4 ml 39.62 ml/m²  LV Vol d, MOD A4C: 81.2 ml 36.78 ml/m²  LV Vol d, MOD BP: 92.3 ml 41.82 ml/m²  LV Vol s, MOD A2C: 29.0 ml 13.13 ml/m²  LV Vol s, MOD A4C: 28.3 ml 12.82 ml/m²  LV Vol s, MOD BP:  28.9 ml 13.08 ml/m²  LVOT SV MOD BP:    63.4 ml  LV EF% MOD BP:     69 %     MV E Vmax:    1.19 m/s  e' lateral:   13.10 cm/s  e'medial:    10.70 cm/s  E/e' lateral: 9.05  E/e' medial:  12.00  E/e' Average: 9.96  MV DT:        148 msec    Aorta Measurements: (Normal range) (Indexed to BSA)     Ao Root d 3.23 cm (3.1 - 3.7 cm) 1.46 cm/m²            Left Atrium Measurements: (Indexed to BSA)  LA Diam 2D: 4.72 cm         Right Atrial Measurements:     RA Vol:       59.00 ml  RA Vol Index: 26.74 ml/m²       LVOT / RVOT/ Qp/Qs Data: (Indexed to BSA)  LVOT Diameter: 2.29 cm  LVOT Area:     4.12 cm²    Mitral Valve Measurements:     MV E Vmax: 1.2 m/s       Tricuspid Valve Measurements:     TR Vmax:          3.0 m/s  TR Peak Gradient: 35.9 mmHg  RA Pressure:      3 mmHg  PASP:             39 mmHg    ________________________________________________________________________________________  Electronically signed on 9/6/2024 at 10:38:59 AM by Claudette Jacobs         *** Final ***

## 2024-11-06 NOTE — PROGRESS NOTE ADULT - ASSESSMENT
54  year old male past medical history of afib on eliquis  s/p cardiac arrest x 2 w/ AHRF , indwelling Aguilera, cerebral aneurysm, CAD (s/p stents), CVA, T2DM, HTN, OM (s/p debridement), PE, perforated gastric ulcer s/p ometnopexy 2019 with Dr. Mejia  sent from wound care with fever, and infected wound to right foot. Cardiology consulted for pre op risk assessment.     Afib with RVR, CAD, HTN   - With known Hx of CAD  - EKG 10/30/24 Af 78 bpm  - No evidence of any active ischemia   - Continue ASA 81mg and statin 40mg     - has known hx of Afib  - Rate controlled per flow sheets   - Continue digoxin, amiodarone and Toprol 100mg BID  - Continue Eliquis  - BP stable and controlled   - Continue midodrine 15mg TID, history of orthostatic hypotension     - TTE 9/4/2024  normal LVF 69% with mod-severe MR,  increased LV mass and concentric hypertrophy, severely dilated LA and RA,  borderline pulmonary hypertension.  - No evidence of volume overload     - Hx anemia, keep hgb > 8 for history of cad   - S/p Deep incision and drainage of multiple areas of foot for plantar foot abscess with purulent drainage.  - Continue abx per ID    - Monitor and replete lytes, keep K>4, Mg>2.  - Will continue to follow.    Danny Arce, MS FNP, AGACNP  Nurse Practitioner- Cardiology   Please call on TEAMS   Attending to bill Attending to bill Attending to bill Attending to bill Attending to bill Attending to bill Attending to bill Attending to bill Attending to bill Attending to bill Attending to bill Attending to bill Attending to bill Attending to bill Attending to bill Attending to bill Attending to bill Attending to bill Attending to bill Attending to bill Attending to bill Attending to bill Attending to bill Attending to bill Attending to bill Attending to bill Attending to bill Attending to bill

## 2024-11-06 NOTE — PROGRESS NOTE ADULT - PROBLEM SELECTOR PLAN 4
monitor for supine hypertension on midodrine

## 2024-11-06 NOTE — PROGRESS NOTE ADULT - SUBJECTIVE AND OBJECTIVE BOX
Patient is a 54y old  Male who presents with a chief complaint of heel OM (05 Nov 2024 12:01)  s/p I and D yesterday  comfortable, without pain presently      INTERVAL HPI/OVERNIGHT EVENTS:  T(C): 36.7 (11-06-24 @ 05:26), Max: 36.7 (11-05-24 @ 11:12)  HR: 70 (11-06-24 @ 05:26) (59 - 73)  BP: 127/75 (11-06-24 @ 05:26) (96/56 - 147/80)  RR: 18 (11-06-24 @ 05:26) (11 - 18)  SpO2: 94% (11-06-24 @ 05:26) (93% - 98%)  Wt(kg): --  I&O's Summary    05 Nov 2024 07:01  -  06 Nov 2024 07:00  --------------------------------------------------------  IN: 300 mL / OUT: 1500 mL / NET: -1200 mL        LABS:                        9.8    12.15 )-----------( 405      ( 06 Nov 2024 08:15 )             31.2     11-06    137  |  105  |  11  ----------------------------<  185[H]  3.7   |  27  |  0.94    Ca    9.4      06 Nov 2024 08:15        Urinalysis Basic - ( 06 Nov 2024 08:15 )    Color: x / Appearance: x / SG: x / pH: x  Gluc: 185 mg/dL / Ketone: x  / Bili: x / Urobili: x   Blood: x / Protein: x / Nitrite: x   Leuk Esterase: x / RBC: x / WBC x   Sq Epi: x / Non Sq Epi: x / Bacteria: x      CAPILLARY BLOOD GLUCOSE      POCT Blood Glucose.: 204 mg/dL (06 Nov 2024 07:44)  POCT Blood Glucose.: 171 mg/dL (05 Nov 2024 22:19)  POCT Blood Glucose.: 225 mg/dL (05 Nov 2024 16:48)  POCT Blood Glucose.: 199 mg/dL (05 Nov 2024 12:34)  POCT Blood Glucose.: 215 mg/dL (05 Nov 2024 11:12)        Urinalysis Basic - ( 06 Nov 2024 08:15 )    Color: x / Appearance: x / SG: x / pH: x  Gluc: 185 mg/dL / Ketone: x  / Bili: x / Urobili: x   Blood: x / Protein: x / Nitrite: x   Leuk Esterase: x / RBC: x / WBC x   Sq Epi: x / Non Sq Epi: x / Bacteria: x        MEDICATIONS  (STANDING):  aMIOdarone    Tablet 200 milliGRAM(s) Oral daily  apixaban 5 milliGRAM(s) Oral every 12 hours  ascorbic acid 500 milliGRAM(s) Oral daily  aspirin  chewable 81 milliGRAM(s) Oral daily  atorvastatin 40 milliGRAM(s) Oral at bedtime  cetirizine 10 milliGRAM(s) Oral daily  chlorhexidine 2% Cloths 1 Application(s) Topical <User Schedule>  dextrose 5%. 1000 milliLiter(s) (100 mL/Hr) IV Continuous <Continuous>  dextrose 5%. 1000 milliLiter(s) (50 mL/Hr) IV Continuous <Continuous>  dextrose 50% Injectable 25 Gram(s) IV Push once  dextrose 50% Injectable 12.5 Gram(s) IV Push once  dextrose 50% Injectable 25 Gram(s) IV Push once  digoxin     Tablet 125 MICROGram(s) Oral daily  famotidine    Tablet 20 milliGRAM(s) Oral daily  ferrous    sulfate 325 milliGRAM(s) Oral daily  gabapentin 300 milliGRAM(s) Oral three times a day  glucagon  Injectable 1 milliGRAM(s) IntraMuscular once  insulin lispro (ADMELOG) corrective regimen sliding scale   SubCutaneous at bedtime  insulin lispro (ADMELOG) corrective regimen sliding scale   SubCutaneous three times a day before meals  melatonin 5 milliGRAM(s) Oral at bedtime  metoprolol succinate  milliGRAM(s) Oral every 12 hours  midodrine. 15 milliGRAM(s) Oral three times a day  naloxegol 25 milliGRAM(s) Oral daily  pantoprazole    Tablet 40 milliGRAM(s) Oral before breakfast  piperacillin/tazobactam IVPB.. 3.375 Gram(s) IV Intermittent every 8 hours  polyethylene glycol 3350 17 Gram(s) Oral daily  senna 2 Tablet(s) Oral at bedtime  sucralfate 1 Gram(s) Oral two times a day  tiotropium 2.5 MICROgram(s)/olodaterol 2.5 MICROgram(s) (STIOLTO) Inhaler 2 Puff(s) Inhalation daily    MEDICATIONS  (PRN):  dextrose Oral Gel 15 Gram(s) Oral once PRN Blood Glucose LESS THAN 70 milliGRAM(s)/deciliter  HYDROmorphone  Injectable 0.2 milliGRAM(s) IV Push every 4 hours PRN Severe Pain (7 - 10)  ondansetron Injectable 4 milliGRAM(s) IV Push every 8 hours PRN Nausea and/or Vomiting  oxyCODONE    IR 5 milliGRAM(s) Oral every 4 hours PRN Moderate Pain (4 - 6)  sodium chloride 0.9% lock flush 10 milliLiter(s) IV Push every 1 hour PRN Pre/post blood products, medications, blood draw, and to maintain line patency  traMADol 50 milliGRAM(s) Oral every 4 hours PRN Mild Pain (1 - 3)      REVIEW OF SYSTEMS:  CONSTITUTIONAL: No fever, weight loss, or fatigue  EYES: No eye pain, visual disturbances, or discharge  ENMT:  No difficulty hearing, tinnitus, vertigo; No sinus or throat pain  NECK: No pain or stiffness  RESPIRATORY: No cough, wheezing, chills or hemoptysis; No shortness of breath  CARDIOVASCULAR: No chest pain, palpitations, dizziness, or leg swelling  GASTROINTESTINAL: No abdominal or epigastric pain. No nausea, vomiting, or hematemesis; No diarrhea or constipation. No melena or hematochezia.  GENITOURINARY: No dysuria, frequency, hematuria, or incontinence  NEUROLOGICAL: No headaches, memory loss, loss of strength, numbness, or tremors  SKIN: No itching, burning, rashes, or lesions   LYMPH NODES: No enlarged glands  ENDOCRINE: No heat or cold intolerance; No hair loss  MUSCULOSKELETAL: No joint pain or swelling; No muscle, back, or extremity pain  PSYCHIATRIC: No depression, anxiety, mood swings, or difficulty sleeping  HEME/LYMPH: No easy bruising, or bleeding gums  ALLERY AND IMMUNOLOGIC: No hives or eczema    RADIOLOGY & ADDITIONAL TESTS:    Imaging Personally Reviewed:  [ ] YES  [ ] NO    Consultant(s) Notes Reviewed:  [ ] YES  [ ] NO    PHYSICAL EXAM:  GENERAL: NAD, well-groomed, well-developed  HEAD:  Atraumatic, Normocephalic  EYES: EOMI, PERRLA, conjunctiva and sclera clear  ENMT: No tonsillar erythema, exudates, or enlargement; Moist mucous membranes,poor dentition  NECK: Supple, No JVD, Normal thyroid  NERVOUS SYSTEM:  Alert & Oriented X3, Good concentration; Motor Strength 5/5 B/L upper and lower extremities; DTRs 2+ intact and symmetric  CHEST/LUNG: Clear to percussion bilaterally; No rales, rhonchi, wheezing, or rubs  HEART: Regular rate and rhythm; No murmurs, rubs, or gallops  ABDOMEN: Soft, Nontender, Nondistended; Bowel sounds present  EXTREMITIES:  dressing C/d/i  LYMPH: No lymphadenopathy noted  SKIN: No rashes or lesions    Care Discussed with Consultants/Other Providers [ ] YES  [ ] NO

## 2024-11-06 NOTE — SOCIAL WORK PROGRESS NOTE - NSSWPROGRESSNOTE_GEN_ALL_CORE
Per MD, pt not , medically cleared for today. Plan remains for pt to ret to [facility] term [facility] on dc. no ins authorization required for pt's return per snf. sw to follow to facilitate dc back to [facility] ltc when medically ready.

## 2024-11-06 NOTE — PROGRESS NOTE ADULT - ASSESSMENT
54m with dm, cad, pad, dvt atrial fib, hypertension, stroke, indwelling jacques, PE, indwelling jacques, prior diagnosis of Right Heel Osteomyelitis  Culture - Wound Aerobic/Anaerobic (09.05.24 @ 12:55):  Escherichia coli ESBL (-  Piperacillin/Tazobactam: R <=8), Citrobacter freundii, Providencia stuartii sent from wound care with fever, and infected wound to right foot, drained in the office and sent for culture, pt has hx of recurrent infections and severe reaction to ertapenem.    RECOMMENDATIONS  Right heel abscess with surrounding cellulitis  Culture - Wound Aerobic/Anaerobic (09.05.24 @ 12:55):  Escherichia coli ESBL (-  Piperacillin/Tazobactam: R <=8), Citrobacter freundii, Providencia stuartii  Culture - Tissue with Gram Stain (10.30.24 @ 11:09)  Citrobacter freundii complex-(high risk for inducible resistance), Escherichia coli ESBL, Staphylococcus haemolyticus, Enterococcus avium (Enterobacter cloacae complex, Klebsiella aerogenes, and Citrobacter freundii are the most common Enterobacterales at moderate risk for clinically significant inducible AmpC production and challenging antimicrobial Rx with severe carbapenem intolerance)     CT-11/3-active osteomyelitis in the appropriate setting,  A couple of abscesses involve the plantar fascia at the level of the calcaneus and navicular.  Deep incision and drainage of multiple areas of foot 05-Nov-2024 12:33:11  Cristi Mcbride- noted purulent drainage  Culture - Tissue with Gram Stain (11.05.24 @ 12:00) Rare polymorphonuclear leukocytes per low power field, No organisms seen per oil power field  Ultimate recs to follow based on 11.05.24 micro, ? need for Vanco    Vancomycin started 10/30-stopped with no evidence of MRSA but - pt with resistant Staphylococcus haemolyticus  Zosyn started 10/30-continue for now placed PICC and plan for 6 weeks abx from 11/5  Zosyn 3.375 grams IV q8 extended infusion with last day 12/16  weekly cbc w diff, CMP    Thank you for consulting us and involving us in the management of this most interesting and challenging case.  We will follow along in the care of this patient. Please call us at [phone] or text me directly on my cell# at [phone] with any concerns.

## 2024-11-06 NOTE — PROGRESS NOTE ADULT - PROBLEM SELECTOR PLAN 5
abx  local wound care
abx  s/p I and D yesterday  podiatry follow up  will DC to [facility] when cleared by podiatry team

## 2024-11-06 NOTE — PROGRESS NOTE ADULT - PROBLEM SELECTOR PROBLEM 4
Orthostatic hypotension

## 2024-11-06 NOTE — PROGRESS NOTE ADULT - PROBLEM SELECTOR PLAN 3
cont eliquis  toprol for rate control  cardio eval if necessary
cont eliquis  toprol for rate control
cont eliquis  toprol for rate control  cardio eval if necessary
cont eliquis  toprol for rate control  cardio eval if necessary
cont eliquis  toprol for rate control
cont eliquis  toprol for rate control  cardio eval if necessary
cont eliquis  toprol for rate control  cardio eval if necessary

## 2024-11-06 NOTE — PROGRESS NOTE ADULT - SUBJECTIVE AND OBJECTIVE BOX
[facility] DIVISION of INFECTIOUS DISEASE  Juventino Whitten MD PhD, Symone Hickey MD, Anne-Marie Li MD, Jeffery Jacobs MD, Ryan Romeo MD  and providing coverage with Melody Armstrong MD  Providing Infectious Disease Consultations at [facility], [facility], [facility], [facility]'s    Office# [phone] to schedule follow up appointments  Answering Service for urgent calls or New Consults [phone]  Cell# to text for urgent issues Juventino Whitten [phone]     infectious diseases progress note:    SARAH MORRISON is a 54y y. o. Male patient    Overnight and events of the last 24hrs reviewed    Allergies    fish (Hives)  ertapenem (Blisters; Rash)    Intolerances        ANTIBIOTICS/RELEVANT:  antimicrobials  piperacillin/tazobactam IVPB.. 3.375 Gram(s) IV Intermittent every 8 hours    immunologic:    OTHER:  aMIOdarone    Tablet 200 milliGRAM(s) Oral daily  apixaban 5 milliGRAM(s) Oral every 12 hours  ascorbic acid 500 milliGRAM(s) Oral daily  aspirin  chewable 81 milliGRAM(s) Oral daily  atorvastatin 40 milliGRAM(s) Oral at bedtime  cetirizine 10 milliGRAM(s) Oral daily  chlorhexidine 2% Cloths 1 Application(s) Topical <User Schedule>  dextrose 5%. 1000 milliLiter(s) IV Continuous <Continuous>  dextrose 5%. 1000 milliLiter(s) IV Continuous <Continuous>  dextrose 50% Injectable 12.5 Gram(s) IV Push once  dextrose 50% Injectable 25 Gram(s) IV Push once  dextrose 50% Injectable 25 Gram(s) IV Push once  dextrose Oral Gel 15 Gram(s) Oral once PRN  digoxin     Tablet 125 MICROGram(s) Oral daily  famotidine    Tablet 20 milliGRAM(s) Oral daily  ferrous    sulfate 325 milliGRAM(s) Oral daily  gabapentin 300 milliGRAM(s) Oral three times a day  glucagon  Injectable 1 milliGRAM(s) IntraMuscular once  HYDROmorphone  Injectable 0.2 milliGRAM(s) IV Push every 4 hours PRN  insulin lispro (ADMELOG) corrective regimen sliding scale   SubCutaneous at bedtime  insulin lispro (ADMELOG) corrective regimen sliding scale   SubCutaneous three times a day before meals  melatonin 5 milliGRAM(s) Oral at bedtime  metoprolol succinate  milliGRAM(s) Oral every 12 hours  midodrine. 15 milliGRAM(s) Oral three times a day  naloxegol 25 milliGRAM(s) Oral daily  ondansetron Injectable 4 milliGRAM(s) IV Push every 8 hours PRN  oxyCODONE    IR 5 milliGRAM(s) Oral every 4 hours PRN  pantoprazole    Tablet 40 milliGRAM(s) Oral before breakfast  polyethylene glycol 3350 17 Gram(s) Oral daily  senna 2 Tablet(s) Oral at bedtime  sodium chloride 0.9% lock flush 10 milliLiter(s) IV Push every 1 hour PRN  sucralfate 1 Gram(s) Oral two times a day  tiotropium 2.5 MICROgram(s)/olodaterol 2.5 MICROgram(s) (STIOLTO) Inhaler 2 Puff(s) Inhalation daily  traMADol 50 milliGRAM(s) Oral every 4 hours PRN      Objective:  Vital Signs Last 24 Hrs  T(C): 36.7 (06 Nov 2024 05:26), Max: 36.7 (05 Nov 2024 11:12)  T(F): 98.1 (06 Nov 2024 05:26), Max: 98.1 (05 Nov 2024 11:12)  HR: 70 (06 Nov 2024 05:26) (59 - 73)  BP: 127/75 (06 Nov 2024 05:26) (96/56 - 147/80)  BP(mean): --  RR: 18 (06 Nov 2024 05:26) (11 - 18)  SpO2: 94% (06 Nov 2024 05:26) (93% - 98%)    Parameters below as of 06 Nov 2024 05:26  Patient On (Oxygen Delivery Method): room air        T(C): 36.7 (11-06-24 @ 05:26), Max: 36.9 (11-04-24 @ 20:11)  T(C): 36.7 (11-06-24 @ 05:26), Max: 36.9 (11-04-24 @ 20:11)  T(C): 36.7 (11-06-24 @ 05:26), Max: 36.9 (11-03-24 @ 05:07)    PHYSICAL EXAM:  HEENT: NC atraumatic  Neck: supple  Respiratory: no accessory muscle use, breathing comfortably  Cardiovascular: distant  Gastrointestinal: normal appearing, nondistended  Extremities: no clubbing, no cyanosis,        LABS:                          9.8    12.15 )-----------( 405      ( 06 Nov 2024 08:15 )             31.2       WBC  12.15 11-06 @ 08:15  8.71 11-04 @ 07:55  10.33 11-03 @ 08:00  9.79 11-01 @ 11:19  9.05 10-31 @ 07:08  12.12 10-30 @ 13:00      11-06    137  |  105  |  11  ----------------------------<  185[H]  3.7   |  27  |  0.94    Ca    9.4      06 Nov 2024 08:15        Creatinine: 0.94 mg/dL (11-06-24 @ 08:15)  Creatinine: 0.87 mg/dL (11-04-24 @ 07:55)  Creatinine: 0.79 mg/dL (11-03-24 @ 08:00)  Creatinine: 0.84 mg/dL (11-01-24 @ 11:19)  Creatinine: 0.64 mg/dL (10-31-24 @ 07:08)  Creatinine: 0.85 mg/dL (10-30-24 @ 13:00)        Urinalysis Basic - ( 06 Nov 2024 08:15 )    Color: x / Appearance: x / SG: x / pH: x  Gluc: 185 mg/dL / Ketone: x  / Bili: x / Urobili: x   Blood: x / Protein: x / Nitrite: x   Leuk Esterase: x / RBC: x / WBC x   Sq Epi: x / Non Sq Epi: x / Bacteria: x            INFLAMMATORY MARKERS      MICROBIOLOGY:    Culture - Tissue with Gram Stain (11.05.24 @ 12:00)    Gram Stain:   Rare polymorphonuclear leukocytes per low power field  No organisms seen per oil power field   Specimen Source: Tissue        RADIOLOGY & ADDITIONAL STUDIES:

## 2024-11-07 ENCOUNTER — TRANSCRIPTION ENCOUNTER (OUTPATIENT)
Age: 54
End: 2024-11-07

## 2024-11-07 VITALS
SYSTOLIC BLOOD PRESSURE: 156 MMHG | HEART RATE: 56 BPM | RESPIRATION RATE: 17 BRPM | TEMPERATURE: 98 F | OXYGEN SATURATION: 96 % | DIASTOLIC BLOOD PRESSURE: 97 MMHG

## 2024-11-07 LAB
-  AMOXICILLIN/CLAVULANIC ACID: SIGNIFICANT CHANGE UP
-  AMPICILLIN/SULBACTAM: SIGNIFICANT CHANGE UP
-  AMPICILLIN: SIGNIFICANT CHANGE UP
-  AMPICILLIN: SIGNIFICANT CHANGE UP
-  AZTREONAM: SIGNIFICANT CHANGE UP
-  CEFAZOLIN: SIGNIFICANT CHANGE UP
-  CEFEPIME: SIGNIFICANT CHANGE UP
-  CEFOXITIN: SIGNIFICANT CHANGE UP
-  CEFTRIAXONE: SIGNIFICANT CHANGE UP
-  CIPROFLOXACIN: SIGNIFICANT CHANGE UP
-  DAPTOMYCIN: SIGNIFICANT CHANGE UP
-  ERTAPENEM: SIGNIFICANT CHANGE UP
-  GENTAMICIN: SIGNIFICANT CHANGE UP
-  IMIPENEM: SIGNIFICANT CHANGE UP
-  LEVOFLOXACIN: SIGNIFICANT CHANGE UP
-  LINEZOLID: SIGNIFICANT CHANGE UP
-  MEROPENEM: SIGNIFICANT CHANGE UP
-  PIPERACILLIN/TAZOBACTAM: SIGNIFICANT CHANGE UP
-  TOBRAMYCIN: SIGNIFICANT CHANGE UP
-  TRIMETHOPRIM/SULFAMETHOXAZOLE: SIGNIFICANT CHANGE UP
-  VANCOMYCIN: SIGNIFICANT CHANGE UP
GLUCOSE BLDC GLUCOMTR-MCNC: 224 MG/DL — HIGH (ref 70–99)
GLUCOSE BLDC GLUCOMTR-MCNC: 249 MG/DL — HIGH (ref 70–99)
METHOD TYPE: SIGNIFICANT CHANGE UP
METHOD TYPE: SIGNIFICANT CHANGE UP

## 2024-11-07 PROCEDURE — 80053 COMPREHEN METABOLIC PANEL: CPT

## 2024-11-07 PROCEDURE — 76376 3D RENDER W/INTRP POSTPROCES: CPT

## 2024-11-07 PROCEDURE — 83036 HEMOGLOBIN GLYCOSYLATED A1C: CPT

## 2024-11-07 PROCEDURE — C1751: CPT

## 2024-11-07 PROCEDURE — 85610 PROTHROMBIN TIME: CPT

## 2024-11-07 PROCEDURE — 87086 URINE CULTURE/COLONY COUNT: CPT

## 2024-11-07 PROCEDURE — 87040 BLOOD CULTURE FOR BACTERIA: CPT

## 2024-11-07 PROCEDURE — 82962 GLUCOSE BLOOD TEST: CPT

## 2024-11-07 PROCEDURE — 83605 ASSAY OF LACTIC ACID: CPT

## 2024-11-07 PROCEDURE — 85027 COMPLETE CBC AUTOMATED: CPT

## 2024-11-07 PROCEDURE — 84145 PROCALCITONIN (PCT): CPT

## 2024-11-07 PROCEDURE — 85652 RBC SED RATE AUTOMATED: CPT

## 2024-11-07 PROCEDURE — 85730 THROMBOPLASTIN TIME PARTIAL: CPT

## 2024-11-07 PROCEDURE — 76937 US GUIDE VASCULAR ACCESS: CPT

## 2024-11-07 PROCEDURE — 80202 ASSAY OF VANCOMYCIN: CPT

## 2024-11-07 PROCEDURE — 94640 AIRWAY INHALATION TREATMENT: CPT

## 2024-11-07 PROCEDURE — 86140 C-REACTIVE PROTEIN: CPT

## 2024-11-07 PROCEDURE — 99285 EMERGENCY DEPT VISIT HI MDM: CPT

## 2024-11-07 PROCEDURE — 84134 ASSAY OF PREALBUMIN: CPT

## 2024-11-07 PROCEDURE — 96374 THER/PROPH/DIAG INJ IV PUSH: CPT

## 2024-11-07 PROCEDURE — 71045 X-RAY EXAM CHEST 1 VIEW: CPT

## 2024-11-07 PROCEDURE — 85025 COMPLETE CBC W/AUTO DIFF WBC: CPT

## 2024-11-07 PROCEDURE — 83735 ASSAY OF MAGNESIUM: CPT

## 2024-11-07 PROCEDURE — 80048 BASIC METABOLIC PNL TOTAL CA: CPT

## 2024-11-07 PROCEDURE — C1889: CPT

## 2024-11-07 PROCEDURE — 93005 ELECTROCARDIOGRAM TRACING: CPT

## 2024-11-07 PROCEDURE — 36573 INSJ PICC RS&I 5 YR+: CPT

## 2024-11-07 PROCEDURE — 99232 SBSQ HOSP IP/OBS MODERATE 35: CPT

## 2024-11-07 PROCEDURE — 73630 X-RAY EXAM OF FOOT: CPT

## 2024-11-07 PROCEDURE — 36415 COLL VENOUS BLD VENIPUNCTURE: CPT

## 2024-11-07 PROCEDURE — 73701 CT LOWER EXTREMITY W/DYE: CPT | Mod: MC

## 2024-11-07 PROCEDURE — 99024 POSTOP FOLLOW-UP VISIT: CPT

## 2024-11-07 PROCEDURE — 87070 CULTURE OTHR SPECIMN AEROBIC: CPT

## 2024-11-07 PROCEDURE — 81001 URINALYSIS AUTO W/SCOPE: CPT

## 2024-11-07 PROCEDURE — 87186 SC STD MICRODIL/AGAR DIL: CPT

## 2024-11-07 PROCEDURE — 87075 CULTR BACTERIA EXCEPT BLOOD: CPT

## 2024-11-07 PROCEDURE — 87077 CULTURE AEROBIC IDENTIFY: CPT

## 2024-11-07 RX ORDER — PIPERACILLIN AND TAZOBACTAM .5; 4 G/20ML; G/20ML
3.38 INJECTION, POWDER, LYOPHILIZED, FOR SOLUTION INTRAVENOUS
Qty: 0 | Refills: 0 | DISCHARGE
Start: 2024-11-07

## 2024-11-07 RX ADMIN — MIDODRINE HYDROCHLORIDE 15 MILLIGRAM(S): 2.5 TABLET ORAL at 11:43

## 2024-11-07 RX ADMIN — NALOXEGOL OXALATE 25 MILLIGRAM(S): 12.5 TABLET, FILM COATED ORAL at 11:42

## 2024-11-07 RX ADMIN — TIOTROPIUM BROMIDE AND OLODATEROL 2 PUFF(S): 3.124; 2.736 SPRAY, METERED RESPIRATORY (INHALATION) at 06:16

## 2024-11-07 RX ADMIN — Medication 125 MICROGRAM(S): at 06:16

## 2024-11-07 RX ADMIN — Medication 0.2 MILLIGRAM(S): at 11:45

## 2024-11-07 RX ADMIN — CHLORHEXIDINE GLUCONATE 1 APPLICATION(S): 40 SOLUTION TOPICAL at 06:28

## 2024-11-07 RX ADMIN — PANTOPRAZOLE SODIUM 40 MILLIGRAM(S): 40 TABLET, DELAYED RELEASE ORAL at 06:16

## 2024-11-07 RX ADMIN — FAMOTIDINE 20 MILLIGRAM(S): 10 INJECTION INTRAVENOUS at 11:43

## 2024-11-07 RX ADMIN — Medication 100 MILLIGRAM(S): at 06:16

## 2024-11-07 RX ADMIN — APIXABAN 5 MILLIGRAM(S): 5 TABLET, FILM COATED ORAL at 06:16

## 2024-11-07 RX ADMIN — Medication 200 MILLIGRAM(S): at 06:16

## 2024-11-07 RX ADMIN — Medication 500 MILLIGRAM(S): at 11:42

## 2024-11-07 RX ADMIN — Medication 2: at 12:07

## 2024-11-07 RX ADMIN — Medication 0.2 MILLIGRAM(S): at 06:18

## 2024-11-07 RX ADMIN — Medication 0.2 MILLIGRAM(S): at 12:25

## 2024-11-07 RX ADMIN — GABAPENTIN 300 MILLIGRAM(S): 300 CAPSULE ORAL at 06:16

## 2024-11-07 RX ADMIN — CETIRIZINE HCL 10 MILLIGRAM(S): 10 TABLET ORAL at 11:42

## 2024-11-07 RX ADMIN — MIDODRINE HYDROCHLORIDE 15 MILLIGRAM(S): 2.5 TABLET ORAL at 06:16

## 2024-11-07 RX ADMIN — Medication 81 MILLIGRAM(S): at 11:42

## 2024-11-07 RX ADMIN — SUCRALFATE 1 GRAM(S): 1 SUSPENSION ORAL at 06:16

## 2024-11-07 RX ADMIN — PIPERACILLIN AND TAZOBACTAM 25 GRAM(S): .5; 4 INJECTION, POWDER, LYOPHILIZED, FOR SOLUTION INTRAVENOUS at 14:30

## 2024-11-07 RX ADMIN — GABAPENTIN 300 MILLIGRAM(S): 300 CAPSULE ORAL at 14:30

## 2024-11-07 RX ADMIN — Medication 2: at 08:05

## 2024-11-07 RX ADMIN — PIPERACILLIN AND TAZOBACTAM 25 GRAM(S): .5; 4 INJECTION, POWDER, LYOPHILIZED, FOR SOLUTION INTRAVENOUS at 06:15

## 2024-11-07 RX ADMIN — Medication 325 MILLIGRAM(S): at 11:43

## 2024-11-07 NOTE — PROGRESS NOTE ADULT - PROBLEM SELECTOR PROBLEM 2
Diabetes
Diabetes
Diabetic ulcer of heel
Diabetes
Diabetic ulcer of heel
Diabetes

## 2024-11-07 NOTE — PROGRESS NOTE ADULT - ASSESSMENT
54m with dm, cad, pad, dvt atrial fib, hypertension, stroke, indwelling jacques, PE, indwelling jacques, prior diagnosis of Right Heel Osteomyelitis  Culture - Wound Aerobic/Anaerobic (09.05.24 @ 12:55):  Escherichia coli ESBL (-  Piperacillin/Tazobactam: R <=8), Citrobacter freundii, Providencia stuartii sent from wound care with fever, and infected wound to right foot, drained in the office and sent for culture, pt has hx of recurrent infections and severe reaction to ertapenem.    RECOMMENDATIONS  Right heel abscess with surrounding cellulitis  Culture - Wound Aerobic/Anaerobic (09.05.24 @ 12:55):  Escherichia coli ESBL (-  Piperacillin/Tazobactam: R <=8), Citrobacter freundii, Providencia stuartii  Culture - Tissue with Gram Stain (10.30.24 @ 11:09)  Citrobacter freundii complex-(high risk for inducible resistance), Escherichia coli ESBL, Staphylococcus haemolyticus, Enterococcus avium (Enterobacter cloacae complex, Klebsiella aerogenes, and Citrobacter freundii are the most common Enterobacterales at moderate risk for clinically significant inducible AmpC production and challenging antimicrobial Rx with severe carbapenem intolerance)     CT-11/3-active osteomyelitis in the appropriate setting,  A couple of abscesses involve the plantar fascia at the level of the calcaneus and navicular.  Deep incision and drainage of multiple areas of foot 05-Nov-2024 12:33:11  Cristi Mcbride- noted purulent drainage  Culture - Tissue with Gram Stain (11.05.24 @ 12:00) Rare Gram Negative Rods  No new PATH  Ultimate recs to follow based on 11.05.24 micro, ? need for Vanco    Vancomycin started 10/30-stopped with no evidence of MRSA but - pt with resistant Staphylococcus haemolyticus so fat not seen on 11.05.24 micro  Zosyn started 10/30-continue for now placed PICC and plan for 6 weeks abx from 11/5  Zosyn 3.375 grams IV q8 extended infusion with last day 12/16  weekly cbc w diff, CMP    Thank you for consulting us and involving us in the management of this most interesting and challenging case.  We will follow along in the care of this patient. Please call us at [phone] or text me directly on my cell# at [phone] with any concerns.

## 2024-11-07 NOTE — DISCHARGE NOTE NURSING/CASE MANAGEMENT/SOCIAL WORK - PATIENT PORTAL LINK FT
You can access the FollowMyHealth Patient Portal offered by [facility] by registering at the following website: [email or web address]. By joining [facility]’s FollowMyHealth portal, you will also be able to view your health information using other applications (apps) compatible with our system.

## 2024-11-07 NOTE — PROGRESS NOTE ADULT - ASSESSMENT
54  year old male past medical history of afib on eliquis  s/p cardiac arrest x 2 w/ AHRF , indwelling Aguilera, cerebral aneurysm, CAD (s/p stents), CVA, T2DM, HTN, OM (s/p debridement), PE, perforated gastric ulcer s/p ometnopexy 2019 with Dr. Mejia  sent from wound care with fever, and infected wound to right foot. Cardiology consulted for pre op risk assessment.     Afib with RVR, CAD, HTN   - With known Hx of CAD  - EKG 10/30/24 Af 78 bpm  - No evidence of any active ischemia   - Continue ASA 81mg and statin 40mg     - has known hx of Afib  - Rate controlled per flow sheets   - Continue digoxin, amiodarone and Toprol 100mg BID  - Continue Eliquis  - BP stable and controlled   - Continue midodrine 15mg TID, history of orthostatic hypotension     - TTE 9/4/2024  normal LVF 69% with mod-severe MR,  increased LV mass and concentric hypertrophy, severely dilated LA and RA,  borderline pulmonary hypertension.  - No evidence of volume overload     - Hx anemia, keep hgb > 8 for history of cad   - S/p Deep incision and drainage of multiple areas of foot for plantar foot abscess with purulent drainage 11/5   - Tolerated procedure well, cardiac status optimal post operatively   - Continue abx per ID    - Monitor and replete lytes, keep K>4, Mg>2.  - Will continue to follow.    Danny Arce, MS FNP, AGACNP  Nurse Practitioner- Cardiology   Please call on TEAMS

## 2024-11-07 NOTE — PROGRESS NOTE ADULT - PROBLEM SELECTOR PROBLEM 1
Abscess of right foot

## 2024-11-07 NOTE — PROGRESS NOTE ADULT - PROBLEM SELECTOR PROBLEM 3
Cellulitis of right foot
Afib
Cellulitis of right foot
Cellulitis of right foot
Afib

## 2024-11-07 NOTE — DISCHARGE NOTE NURSING/CASE MANAGEMENT/SOCIAL WORK - FINANCIAL ASSISTANCE
[facility] provides services at a reduced cost to those who are determined to be eligible through [facility]’s financial assistance program. Information regarding [facility]’s financial assistance program can be found by going to [email or web address] or by calling [phone].

## 2024-11-07 NOTE — PROGRESS NOTE ADULT - PROBLEM SELECTOR PLAN 1
Patient is s/p incision and drainage to the right foot in the OR - no purulence was noted, all non viable tissue was resected   New OR soft tissue cultures obtained  Right heel wound noted with granulation tissue   Patient s/p PICC line   Antibiotics per ID  Will follow up at the wound care center   Recommend PT for patient for ROM exercises to the lower extremity  Patient has been  non ambulatory since February - goal is for patient to heal the wound and start ambulation       Wound Care Instructions:  -Keep dressing clean, dry, and intact to the right foot  - Apply silver alginate and sterile gauze, abd pad and kerlix   - Change every day   -Patient to have heels offloaded over two pillows below the calf at all times to alleviate the pressure to the heels and aide with wound healing   -Monitor for any signs of infection including redness, swelling in the leg above the bandage, nausea/vomiting/fever/chills/chest pain/shortness of breath, if any are present patient must report to the emergency department immediately  -Patient is to follow up with Dr. Hale/Dr. Todd/ Dr. Jewell  within 5 days after discharge at [facility]. Please call to make an appointment [phone].
s/p I and D at wound care center yesterday  follow up cultures  IV abx per ID  ID eval dr Whitten et al appreciated  podiatry eval appreciated
Plantar heel wound noted with granulation tissue   Plantar midfoot wound with no more purulent drainage, noted serosanguinous drainage   Mild edema, erythema receding, will reassess tomorrow    C/w IV antibiotics - WBC trending down and WNL   Pending wound cultures  Patient to have heels offloaded over two pillows below the calf at all times to alleviate the pressure to the heels and aide with wound healing       Wound Care Instructions:  -Keep dressing clean, dry, and intact to the right foot  - Apply silver alginate and sterile gauze, abd pad and kerlix   - Change every day   -Patient to have heels offloaded over two pillows below the calf at all times to alleviate the pressure to the heels and aide with wound healing   -Monitor for any signs of infection including redness, swelling in the leg above the bandage, nausea/vomiting/fever/chills/chest pain/shortness of breath, if any are present patient must report to the emergency department immediately  -Patient is to follow up with Dr. Hale/Dr. Todd/ Dr. Jewell  within 5 days after discharge at [facility] [place]. Please call to make an appointment [phone]
s/p I and D at wound care center   follow up cultures  IV abx - zosyn and vanco per ID  ID eval dr Whitten et al appreciated  podiatry eval appreciated
Patient is s/p incision and drainage to the right foot in the OR - no purulence was noted, all non viable tissue was resected   New OR soft tissue cultures obtained  Right heel wound noted with granulation tissue   Patient s/p PICC line   Antibiotics per ID  Will follow up at the wound care center   Recommend PT for patient for ROM exercises to the lower extremity  Patient has been  non ambulatory since February - goal is for patient to heal the wound and start ambulation       Wound Care Instructions:  -Keep dressing clean, dry, and intact to the right foot  - Apply silver alginate and sterile gauze, abd pad and kerlix   - Change every day   -Patient to have heels offloaded over two pillows below the calf at all times to alleviate the pressure to the heels and aide with wound healing   -Monitor for any signs of infection including redness, swelling in the leg above the bandage, nausea/vomiting/fever/chills/chest pain/shortness of breath, if any are present patient must report to the emergency department immediately  -Patient is to follow up with Dr. Hale/Dr. Todd/ Dr. Jewell  within 5 days after discharge at [facility]. Please call to make an appointment [phone].
Plantar heel wound noted with granulation tissue   Plantar midfoot wound with no more purulent drainage, noted serosanguinous drainage   No more erythema or purulent drainage noted. WBC within normal   Wound cultures positive for E. Coli from this visit and numerous strains from last visit  Recommend antibiotics per ID   Patient to have heels offloaded over two pillows below the calf at all times to alleviate the pressure to the heels and aide with wound healing   Patient to follow up at the wound care center after d/c from hospital   Patient will need dressing changes every day for the right heel wound and close monitoring of the new midfoot wound. Patient unable to obtain MRI. Will discuss with medical team for CT of the right foot to r/o deeper abscess prior to discharge       Wound Care Instructions:  -Keep dressing clean, dry, and intact to the right foot  - Apply silver alginate and sterile gauze, abd pad and kerlix   - Change every day   -Patient to have heels offloaded over two pillows below the calf at all times to alleviate the pressure to the heels and aide with wound healing   -Monitor for any signs of infection including redness, swelling in the leg above the bandage, nausea/vomiting/fever/chills/chest pain/shortness of breath, if any are present patient must report to the emergency department immediately  -Patient is to follow up with Dr. Hale/Dr. Todd/ Dr. Jewell  within 5 days after discharge at [facility] [place]. Please call to make an appointment [phone]
s/p I and D at wound care center   follow up cultures  IV abx - zosyn  per ID  ID eval dr Whitten et al appreciated  podiatry eval appreciated   CT of heel / foot
s/p I and D at wound care center   follow up cultures  IV abx - zosyn  per ID  ID eval dr Whitten et al appreciated  podiatry eval appreciated   CT of heel / foot with abscess
Plantar heel wound noted with granulation tissuel, erythema resolved   Plantar midfoot wound with no more purulent drainage, noted serosanguinous drainage o more erythema or purulent drainage noted. WBC within normal   Wound cultures positive for E. Coli from this visit and numerous strains from last visit  Recommend antibiotics per ID   CT- Positive for abscess - could be air from open wound vs deeper abscess. Will schedule patient for the OR for incision and drainage and wash out of the plantar midfoot wound for suspicion of deeper abscess and associated localized pain   Patient to have heels offloaded over two pillows below the calf at all times to alleviate the pressure to the heels and aide with wound healing   Patient to follow up at the wound care center after d/c from hospital   Patient will need dressing changes every day for the right heel wound and close monitoring of the new midfoot wound. Patient unable to obtain MRI.     Procedure discussed at length with patient regarding risks, complications, benefits, as well as pre/intra/post-operative expectations. Patient verbally consents to procedure and understood discussion regarding procedure and all questions were answered to patient's satisfaction at this time.  Patient scheduled for right plantar midfoot incision and drainage with pulse lavage for tomorrow 11/5/24. Discussed with patient and family that patient is still high risk for more proximal amputation, loss of limb, sepsis and loss of life.   Request medical and cardiac optimization and risk stratification  Please keep patient NPO after midnight for surgery scheduled tomorrow
s/p I and D at wound care center   follow up cultures  IV abx - zosyn  per ID  ID eval dr Whitten et al appreciated  podiatry eval appreciated  ? CT
s/p I and D at wound care center   follow up cultures  IV abx - zosyn  per ID  ID eval dr Whitten et al appreciated  podiatry eval appreciated   CT of heel / foot with abscess
s/p I and D at wound care center   follow up cultures  IV abx - zosyn  per ID  ID eval dr Whitten et al appreciated  podiatry eval appreciated  ? CT

## 2024-11-07 NOTE — PROGRESS NOTE ADULT - SUBJECTIVE AND OBJECTIVE BOX
[facility] -- Brittany Lutz, Reynaldo Sweeney Savella, Goodger, Cohen: Office # [phone]    Follow Up:  Cardiac optimization    Subjective/Observations: Patient awake, alert, resting in bed. Denies chest pain, palpitations and dizziness. Denies any difficulty breathing. No orthopnea and PND. Tolerating room air. Right foot DSD intact.     REVIEW OF SYSTEMS: All other review of systems are negative unless indicated above    PAST MEDICAL & SURGICAL HISTORY:  Diabetes      Diabetes mellitus with no complication      Afib      Hypertension      Hypertension      BPH (benign prostatic hyperplasia)      Perforated gastric ulcer  s/p emergent ex-lap omentopexy and plication 6/2019      Pulmonary embolism      History of non-ST elevation myocardial infarction (NSTEMI)      Osteomyelitis  s/p debridement      CAD S/P percutaneous coronary angioplasty      Cerebrovascular accident      H/O abdominal surgery      Perforated gastric ulcer      Traumatic amputation of left foot, initial encounter          MEDICATIONS  (STANDING):  aMIOdarone    Tablet 200 milliGRAM(s) Oral daily  apixaban 5 milliGRAM(s) Oral every 12 hours  ascorbic acid 500 milliGRAM(s) Oral daily  aspirin  chewable 81 milliGRAM(s) Oral daily  atorvastatin 40 milliGRAM(s) Oral at bedtime  cetirizine 10 milliGRAM(s) Oral daily  chlorhexidine 2% Cloths 1 Application(s) Topical <User Schedule>  dextrose 5%. 1000 milliLiter(s) (50 mL/Hr) IV Continuous <Continuous>  dextrose 5%. 1000 milliLiter(s) (100 mL/Hr) IV Continuous <Continuous>  dextrose 50% Injectable 12.5 Gram(s) IV Push once  dextrose 50% Injectable 25 Gram(s) IV Push once  dextrose 50% Injectable 25 Gram(s) IV Push once  digoxin     Tablet 125 MICROGram(s) Oral daily  famotidine    Tablet 20 milliGRAM(s) Oral daily  ferrous    sulfate 325 milliGRAM(s) Oral daily  gabapentin 300 milliGRAM(s) Oral three times a day  glucagon  Injectable 1 milliGRAM(s) IntraMuscular once  insulin lispro (ADMELOG) corrective regimen sliding scale   SubCutaneous at bedtime  insulin lispro (ADMELOG) corrective regimen sliding scale   SubCutaneous three times a day before meals  melatonin 5 milliGRAM(s) Oral at bedtime  metoprolol succinate  milliGRAM(s) Oral every 12 hours  midodrine. 15 milliGRAM(s) Oral three times a day  naloxegol 25 milliGRAM(s) Oral daily  pantoprazole    Tablet 40 milliGRAM(s) Oral before breakfast  piperacillin/tazobactam IVPB.. 3.375 Gram(s) IV Intermittent every 8 hours  polyethylene glycol 3350 17 Gram(s) Oral daily  senna 2 Tablet(s) Oral at bedtime  sucralfate 1 Gram(s) Oral two times a day  tiotropium 2.5 MICROgram(s)/olodaterol 2.5 MICROgram(s) (STIOLTO) Inhaler 2 Puff(s) Inhalation daily    MEDICATIONS  (PRN):  dextrose Oral Gel 15 Gram(s) Oral once PRN Blood Glucose LESS THAN 70 milliGRAM(s)/deciliter  HYDROmorphone  Injectable 0.2 milliGRAM(s) IV Push every 4 hours PRN Severe Pain (7 - 10)  ondansetron Injectable 4 milliGRAM(s) IV Push every 8 hours PRN Nausea and/or Vomiting  oxyCODONE    IR 5 milliGRAM(s) Oral every 4 hours PRN Moderate Pain (4 - 6)  sodium chloride 0.9% lock flush 10 milliLiter(s) IV Push every 1 hour PRN Pre/post blood products, medications, blood draw, and to maintain line patency  traMADol 50 milliGRAM(s) Oral every 4 hours PRN Mild Pain (1 - 3)    Allergies    fish (Hives)  ertapenem (Blisters; Rash)    Intolerances      Vital Signs Last 24 Hrs  T(C): 36.6 (07 Nov 2024 04:58), Max: 37.4 (06 Nov 2024 11:31)  T(F): 97.9 (07 Nov 2024 04:58), Max: 99.3 (06 Nov 2024 11:31)  HR: 64 (07 Nov 2024 04:58) (64 - 91)  BP: 119/69 (07 Nov 2024 04:58) (119/69 - 149/77)  BP(mean): --  RR: 18 (07 Nov 2024 04:58) (17 - 18)  SpO2: 95% (07 Nov 2024 04:58) (94% - 95%)    Parameters below as of 07 Nov 2024 04:58  Patient On (Oxygen Delivery Method): room air      I&O's Summary    06 Nov 2024 07:01  -  07 Nov 2024 07:00  --------------------------------------------------------  IN: 100 mL / OUT: 3600 mL / NET: -3500 mL    TELE: Not on telemetry   PHYSICAL EXAM:  Constitutional: NAD, awake and alert  HEENT: Moist Mucous Membranes, Anicteric  Pulmonary: Non-labored, breath sounds are clear bilaterally, No wheezing, rales or rhonchi  Cardiovascular: Regular, S1 and S2, No murmurs, No rubs, gallops or clicks  Gastrointestinal:  soft, nontender, nondistended   Lymph: No peripheral edema. No lymphadenopathy.   Skin: No visible rashes Right foot DSD intact.   Psych:  Mood & affect appropriate    LABS: All Labs Reviewed:                        9.8    12.15 )-----------( 405      ( 06 Nov 2024 08:15 )             31.2     06 Nov 2024 08:15    137    |  105    |  11     ----------------------------<  185    3.7     |  27     |  0.94     Ca    9.4        06 Nov 2024 08:15         12 Lead ECG:   Ventricular Rate 78 BPM    QRS Duration 74 ms    Q-T Interval 366 ms    QTC Calculation(Bazett) 417 ms    R Axis -24 degrees    T Axis 135 degrees    Diagnosis Line Atrial fibrillation  Low voltage QRS  Nonspecific T wave abnormality  Abnormal ECG  Confirmed by darien France ([id number]) on 10/30/2024 2:34:23 PM (10-30-24 @ 13:02)      TRANSTHORACIC ECHOCARDIOGRAM REPORT  ________________________________________________________________________________                                      _______       Pt. Name:       SARAH MORRISON Study Date:    9/5/2024  MRN:            [id number]         YOB: 1970  Accession #:    [id number]        Age:           54 years  Account#:       [id number]       Gender:        M  Heart Rate:                      Height:        74.02 in (188.00 cm)  Rhythm:                          Weight:        207.23 lb (94.00 kg)  Blood Pressure: 97/60 mmHg       BSA/BMI:       2.21 m² / 26.60 kg/m²  ________________________________________________________________________________________  Referring Physician:    [id number] Hill Brizuela  Interpreting Physician: Claudette Jacobs  Primary Sonographer:    Mounika FELDMAN    CPT:               ECHO TTE WO CON COMP W DOPP - 57845.m  Indication(s):     Abnormal electrocardiogram ECG/EKG - R94.31  Procedure:         Transthoracic echocardiogram with2-D, M-mode and complete                     spectral and color flow Doppler.  Ordering Location: [facility]  Admission Status:  Inpatient  Study Information: Image quality for this study is technically difficult.    _______________________________________________________________________________________     CONCLUSIONS:      1. Technically difficult image quality.   2. Left ventricular cavity is normal in size. Left ventricular wall thickness is normal. Left ventricular systolic function is normal withan ejection fraction of 69 % by Castañeda's method of disks. There are no regional wall motion abnormalities seen.   3. There is increased LV mass and concentric hypertrophy.   4. Normal right ventricular cavity size and normal right ventricular systolic function.   5. Left atrium is severely dilated.   6. The right atrium is severely dilated.   7. There is calcification of the mitral valve annulus.   8. Mitral valve leaflets are diffusely calcified.   9. Moderate to severe mitral regurgitation.  10. Trileaflet aortic valve with normal systolic excursion. There is calcification of the aortic valve leaflets.  11. Mild tricuspid regurgitation.  12. Estimated pulmonary artery systolic pressure is 39 mmHg, consistent with borderline pulmonary hypertension.  13. The inferior vena cava is normal in size measuring 1.75 cm in diameter, (normal <2.1cm) with normal inspiratory collapse (normal >50%) consistent with normal right atrial pressure (~3, range 0-5mmHg).    ________________________________________________________________________________________  FINDINGS:     Left Ventricle:  The left ventricular cavity is normal in size. Left ventricular wall thickness is normal. Left ventricular systolic function is normal with a calculated ejection fraction of 69 % by the Castañeda's biplane method of disks. There are no regional wall motion abnormalities seen. There is increased LV mass and concentric hypertrophy. The left ventricular diastolic function is indeterminate.     Right Ventricle:  The right ventricular cavity is normal in size and right ventricular systolic function is normal.     Left Atrium:  The left atrium is severely dilated with an indexed volume of 31.95 ml/m².     Right Atrium:  The right atrium is severely dilated with an indexed volume of 26.74 ml/m².     Aortic Valve:  The aortic valve appears trileaflet with normal systolic excursion. There is calcification of the aortic valve leaflets.     Mitral Valve:  There is calcification of the mitral valve annulus. Mitral valve leaflets are diffusely calcified. There is moderate to severe mitral regurgitation.     Tricuspid Valve:  Structurally normal tricuspid valve with normal leaflet excursion. There is mild tricuspid regurgitation. Estimated pulmonary artery systolicpressure is 39 mmHg, consistent with borderline pulmonary hypertension.     Pulmonic Valve:  The pulmonic valve was not well visualized. There is trace pulmonic regurgitation.     Systemic Veins:  The inferior vena cava is normal in size measuring 1.75 cm in diameter, (normal <2.1cm) with normal inspiratory collapse (normal >50%) consistent with normal right atrial pressure (~3, range 0-5mmHg).  ____________________________________________________________________  QUANTITATIVE DATA:  Left Ventricle Measurements: (Indexed to BSA)     IVSd (2D):   1.4 cm  LVPWd (2D):  1.3 cm  LVIDd (2D):  5.4 cm  LVIDs (2D):  4.0 cm  LV Mass:     306 g  138.7 g/m²  LV Vol d, MOD A2C: 87.4 ml 39.62 ml/m²  LV Vol d, MOD A4C: 81.2 ml 36.78 ml/m²  LV Vol d, MOD BP: 92.3 ml 41.82 ml/m²  LV Vol s, MOD A2C: 29.0 ml 13.13 ml/m²  LV Vol s, MOD A4C: 28.3 ml 12.82 ml/m²  LV Vol s, MOD BP:  28.9 ml 13.08 ml/m²  LVOT SV MOD BP:    63.4 ml  LV EF% MOD BP:     69 %     MV E Vmax:    1.19 m/s  e' lateral:   13.10 cm/s  e'medial:    10.70 cm/s  E/e' lateral: 9.05  E/e' medial:  12.00  E/e' Average: 9.96  MV DT:        148 msec    Aorta Measurements: (Normal range) (Indexed to BSA)     Ao Root d 3.23 cm (3.1 - 3.7 cm) 1.46 cm/m²            Left Atrium Measurements: (Indexed to BSA)  LA Diam 2D: 4.72 cm         Right Atrial Measurements:     RA Vol:       59.00 ml  RA Vol Index: 26.74 ml/m²       LVOT / RVOT/ Qp/Qs Data: (Indexed to BSA)  LVOT Diameter: 2.29 cm  LVOT Area:     4.12 cm²    Mitral Valve Measurements:     MV E Vmax: 1.2 m/s       Tricuspid Valve Measurements:     TR Vmax:          3.0 m/s  TR Peak Gradient: 35.9 mmHg  RA Pressure:      3 mmHg  PASP:             39 mmHg    ________________________________________________________________________________________  Electronically signed on 9/6/2024 at 10:38:59 AM by Claudette Jacobs         *** Final ***

## 2024-11-07 NOTE — PROGRESS NOTE ADULT - SUBJECTIVE AND OBJECTIVE BOX
54y year old Male seen at \[facility]\"" 1EAS 112 W1 for s/p incision and drainage of abscess to the right foot DOS 11/5/24.   Denies any fever, chills, nausea, vomiting, chest pain, shortness of breath, or calf pain at this time.    Allergies    fish (Hives)  ertapenem (Blisters; Rash)    Intolerances        MEDICATIONS  (STANDING):  aMIOdarone    Tablet 200 milliGRAM(s) Oral daily  apixaban 5 milliGRAM(s) Oral every 12 hours  ascorbic acid 500 milliGRAM(s) Oral daily  aspirin  chewable 81 milliGRAM(s) Oral daily  atorvastatin 40 milliGRAM(s) Oral at bedtime  cetirizine 10 milliGRAM(s) Oral daily  chlorhexidine 2% Cloths 1 Application(s) Topical <User Schedule>  dextrose 5%. 1000 milliLiter(s) (100 mL/Hr) IV Continuous <Continuous>  dextrose 5%. 1000 milliLiter(s) (50 mL/Hr) IV Continuous <Continuous>  dextrose 50% Injectable 25 Gram(s) IV Push once  dextrose 50% Injectable 12.5 Gram(s) IV Push once  dextrose 50% Injectable 25 Gram(s) IV Push once  digoxin     Tablet 125 MICROGram(s) Oral daily  famotidine    Tablet 20 milliGRAM(s) Oral daily  ferrous    sulfate 325 milliGRAM(s) Oral daily  gabapentin 300 milliGRAM(s) Oral three times a day  glucagon  Injectable 1 milliGRAM(s) IntraMuscular once  insulin lispro (ADMELOG) corrective regimen sliding scale   SubCutaneous at bedtime  insulin lispro (ADMELOG) corrective regimen sliding scale   SubCutaneous three times a day before meals  melatonin 5 milliGRAM(s) Oral at bedtime  metoprolol succinate  milliGRAM(s) Oral every 12 hours  midodrine. 15 milliGRAM(s) Oral three times a day  naloxegol 25 milliGRAM(s) Oral daily  pantoprazole    Tablet 40 milliGRAM(s) Oral before breakfast  piperacillin/tazobactam IVPB.. 3.375 Gram(s) IV Intermittent every 8 hours  polyethylene glycol 3350 17 Gram(s) Oral daily  senna 2 Tablet(s) Oral at bedtime  sucralfate 1 Gram(s) Oral two times a day  tiotropium 2.5 MICROgram(s)/olodaterol 2.5 MICROgram(s) (STIOLTO) Inhaler 2 Puff(s) Inhalation daily    MEDICATIONS  (PRN):  dextrose Oral Gel 15 Gram(s) Oral once PRN Blood Glucose LESS THAN 70 milliGRAM(s)/deciliter  HYDROmorphone  Injectable 0.2 milliGRAM(s) IV Push every 4 hours PRN Severe Pain (7 - 10)  ondansetron Injectable 4 milliGRAM(s) IV Push every 8 hours PRN Nausea and/or Vomiting  oxyCODONE    IR 5 milliGRAM(s) Oral every 4 hours PRN Moderate Pain (4 - 6)  sodium chloride 0.9% lock flush 10 milliLiter(s) IV Push every 1 hour PRN Pre/post blood products, medications, blood draw, and to maintain line patency  traMADol 50 milliGRAM(s) Oral every 4 hours PRN Mild Pain (1 - 3)      Vital Signs Last 24 Hrs  T(C): 36.7 (07 Nov 2024 16:27), Max: 37 (06 Nov 2024 21:12)  T(F): 98 (07 Nov 2024 16:27), Max: 98.6 (06 Nov 2024 21:12)  HR: 56 (07 Nov 2024 16:27) (56 - 70)  BP: 156/97 (07 Nov 2024 16:27) (119/69 - 156/97)  BP(mean): --  RR: 17 (07 Nov 2024 16:27) (17 - 18)  SpO2: 96% (07 Nov 2024 16:27) (95% - 98%)    Parameters below as of 07 Nov 2024 16:27  Patient On (Oxygen Delivery Method): room air        PHYSICAL EXAM:  Vascular: DP & PT non palpable bilaterally, Capillary refill 3 seconds, Digital hair absent bilaterally  Neurological: Light touch sensation diminished bilaterally  Musculoskeletal: 3/5 strength in all quadrants to the right and 4/5 to the left , s/p partial calcanectomy of the right heel   Dermatological:  s/p incision and drainage to  the right heel wound now measuring   16.0 cm x 7.6cm x 1.0cm and tracking distally with  granular wound bed, no probe to bone, no periwound erythema, no fluctuance, no malodor, no proximal streaking at this time    CBC Full  -  ( 06 Nov 2024 08:15 )  WBC Count : 12.15 K/uL  RBC Count : 3.33 M/uL  Hemoglobin : 9.8 g/dL  Hematocrit : 31.2 %  Platelet Count - Automated : 405 K/uL  Mean Cell Volume : 93.7 fl  Mean Cell Hemoglobin : 29.4 pg  Mean Cell Hemoglobin Concentration : 31.4 g/dL  Auto Neutrophil # : x  Auto Lymphocyte # : x  Auto Monocyte # : x  Auto Eosinophil # : x  Auto Basophil # : x  Auto Neutrophil % : x  Auto Lymphocyte % : x  Auto Monocyte % : x  Auto Eosinophil % : x  Auto Basophil % : x    11-06    137  |  105  |  11  ----------------------------<  185[H]  3.7   |  27  |  0.94    Ca    9.4      06 Nov 2024 08:15                          9.8    12.15 )-----------( 405      ( 06 Nov 2024 08:15 )             31.2       Culture - Tissue with Gram Stain (collected 05 Nov 2024 12:00)  Source: Tissue  Gram Stain (06 Nov 2024 20:53):    Rare polymorphonuclear leukocytes per low power field    No organisms seen per oil power field  Preliminary Report (07 Nov 2024 17:14):    Rare Citrobacter freundii complex    Rare Enterococcus faecium (vancomycin resistant)  Organism: Citrobacter freundii complex  Enterococcus faecium (vancomycin resistant) (07 Nov 2024 17:12)  Organism: Enterococcus faecium (vancomycin resistant) (07 Nov 2024 17:12)  Organism: Citrobacter freundii complex (07 Nov 2024 17:12)

## 2024-11-07 NOTE — PROGRESS NOTE ADULT - NS ATTEND AMEND GEN_ALL_CORE FT
55 YO M with past medical history of AFib (on Eliquis), s/p cardiac arrest x 2 (on recent admission), indwelling Aguilera, cerebral aneurysm, CAD (s/p stents), CVA, T2DM, HTN, OM (s/p debridement), PE, perforated gastric ulcer s/p omentopexy 2019 with Dr. Mejia, sent from wound care with fever, and infected wound to right foot.     - has been doing fairly well from cv standpoint  - tolerated debridement.   - did have af with rapid rates in the setting of septic shock/gangrenous foot in 9/2024  - now in rate controlled af  - cont bb, digoxin and amio (was started on prior admission for rate control in the setting of hypotension)  - cont midodrine for orthostatic hypotension  - on ac, Eliquis  - no sign of volume overload. Last echo in 9/2024 with normal LV function, moderate to severe MR.  - known history of cad without sign of acs. Cont asa and statin  - Monitor and replete Lytes. Keep K > 4 and Mg > 2  - will follow with you.
54  year old male past medical history of afib on eliquis  s/p cardiac arrest x 2 w/ AHRF , indwelling Aguilera, cerebral aneurysm, CAD (s/p stents), CVA, T2DM, HTN, OM (s/p debridement), PE, perforated gastric ulcer s/p ometnopexy 2019 with Dr. Mejia  sent from wound care with fever, and infected wound to right foot. Cardiology consulted for pre op risk assessment.       - Continue ASA 81mg and statin 40mg  - Continue digoxin, amiodarone and Toprol 100mg BID  - Continue Eliquis  - Continue midodrine 15mg TID, history of orthostatic hypotension   - Hx anemia, keep hgb > 8 for history of cad
55 YO M with past medical history of AFib (on Eliquis), s/p cardiac arrest x 2 (on recent admission), indwelling Aguilera, cerebral aneurysm, CAD (s/p stents), CVA, T2DM, HTN, OM (s/p debridement), PE, perforated gastric ulcer s/p omentopexy 2019 with Dr. Mejia, sent from wound care with fever, and infected wound to right foot.     - has been doing fairly well from cv standpoint  - did have af with rapid rates in the setting of septic shock/gangrenous foot in 9/2024  - now in rate controlled af  - cont bb, digoxin and amio (was started on prior admission for rate control in the setting of hypotension)  - cont midodrine for orthostatic hypotension  - on ac, Eliquis on hold for OR today  - no sign of volume overload. Last echo in 9/2024 with normal LV function, moderate to severe MR.  - known history of cad without sign of acs. Cont asa and statin  - Monitor and replete Lytes. Keep K > 4 and Mg > 2  - no cardiac contraindication to proceeding with debridement.   - will follow with you.

## 2024-11-07 NOTE — DISCHARGE NOTE NURSING/CASE MANAGEMENT/SOCIAL WORK - NSDCPEFALRISK_GEN_ALL_CORE
For information on Fall & Injury Prevention, visit: [email or web address] OR  [email or web address] OR  https://www.cdc.gov/steadi/patient.html

## 2024-11-07 NOTE — DISCHARGE NOTE NURSING/CASE MANAGEMENT/SOCIAL WORK - NSDCVIVACCINE_GEN_ALL_CORE_FT
pneumococcal polysaccharide PPV23; 31-Oct-2019 13:45; Gabbie Andrew (RN); [employer] [id number] (Exp. Date: 07-Aug-2020); IntraMuscular; Deltoid Right.; 0.5 milliLiter(s); VIS (VIS Published: 06-Oct-2009, VIS Presented: 31-Oct-2019);

## 2024-11-07 NOTE — PROGRESS NOTE ADULT - SUBJECTIVE AND OBJECTIVE BOX
[facility] DIVISION of INFECTIOUS DISEASE  Juventino Whitten MD PhD, Symone Hickey MD, Anen-Marie Li MD, Jeffery Jacobs MD, Ryan Romeo MD  and providing coverage with Melody Armstrong MD  Providing Infectious Disease Consultations at [facility], formerly [facility], [facility], [facility], [facility]'s    Office# [phone] to schedule follow up appointments  Answering Service for urgent calls or New Consults [phone]  Cell# to text for urgent issues Juventino Whitten [phone]     infectious diseases progress note:    SARAH MORRISON is a 54y y. o. Male patient    Overnight and events of the last 24hrs reviewed    Allergies    fish (Hives)  ertapenem (Blisters; Rash)    Intolerances        ANTIBIOTICS/RELEVANT:  antimicrobials  piperacillin/tazobactam IVPB.. 3.375 Gram(s) IV Intermittent every 8 hours    immunologic:    OTHER:  aMIOdarone    Tablet 200 milliGRAM(s) Oral daily  apixaban 5 milliGRAM(s) Oral every 12 hours  ascorbic acid 500 milliGRAM(s) Oral daily  aspirin  chewable 81 milliGRAM(s) Oral daily  atorvastatin 40 milliGRAM(s) Oral at bedtime  cetirizine 10 milliGRAM(s) Oral daily  chlorhexidine 2% Cloths 1 Application(s) Topical <User Schedule>  dextrose 5%. 1000 milliLiter(s) IV Continuous <Continuous>  dextrose 5%. 1000 milliLiter(s) IV Continuous <Continuous>  dextrose 50% Injectable 25 Gram(s) IV Push once  dextrose 50% Injectable 12.5 Gram(s) IV Push once  dextrose 50% Injectable 25 Gram(s) IV Push once  dextrose Oral Gel 15 Gram(s) Oral once PRN  digoxin     Tablet 125 MICROGram(s) Oral daily  famotidine    Tablet 20 milliGRAM(s) Oral daily  ferrous    sulfate 325 milliGRAM(s) Oral daily  gabapentin 300 milliGRAM(s) Oral three times a day  glucagon  Injectable 1 milliGRAM(s) IntraMuscular once  HYDROmorphone  Injectable 0.2 milliGRAM(s) IV Push every 4 hours PRN  insulin lispro (ADMELOG) corrective regimen sliding scale   SubCutaneous at bedtime  insulin lispro (ADMELOG) corrective regimen sliding scale   SubCutaneous three times a day before meals  melatonin 5 milliGRAM(s) Oral at bedtime  metoprolol succinate  milliGRAM(s) Oral every 12 hours  midodrine. 15 milliGRAM(s) Oral three times a day  naloxegol 25 milliGRAM(s) Oral daily  ondansetron Injectable 4 milliGRAM(s) IV Push every 8 hours PRN  oxyCODONE    IR 5 milliGRAM(s) Oral every 4 hours PRN  pantoprazole    Tablet 40 milliGRAM(s) Oral before breakfast  polyethylene glycol 3350 17 Gram(s) Oral daily  senna 2 Tablet(s) Oral at bedtime  sodium chloride 0.9% lock flush 10 milliLiter(s) IV Push every 1 hour PRN  sucralfate 1 Gram(s) Oral two times a day  tiotropium 2.5 MICROgram(s)/olodaterol 2.5 MICROgram(s) (STIOLTO) Inhaler 2 Puff(s) Inhalation daily  traMADol 50 milliGRAM(s) Oral every 4 hours PRN      Objective:  Vital Signs Last 24 Hrs  T(C): 36.6 (07 Nov 2024 04:58), Max: 37.4 (06 Nov 2024 11:31)  T(F): 97.9 (07 Nov 2024 04:58), Max: 99.3 (06 Nov 2024 11:31)  HR: 64 (07 Nov 2024 04:58) (64 - 91)  BP: 119/69 (07 Nov 2024 04:58) (119/69 - 149/77)  BP(mean): --  RR: 18 (07 Nov 2024 04:58) (17 - 18)  SpO2: 95% (07 Nov 2024 04:58) (94% - 95%)    Parameters below as of 07 Nov 2024 04:58  Patient On (Oxygen Delivery Method): room air        T(C): 36.6 (11-07-24 @ 04:58), Max: 37.4 (11-06-24 @ 11:31)  T(C): 36.6 (11-07-24 @ 04:58), Max: 37.4 (11-06-24 @ 11:31)  T(C): 36.6 (11-07-24 @ 04:58), Max: 37.4 (11-06-24 @ 11:31)    PHYSICAL EXAM:  HEENT: NC atraumatic  Neck: supple  Respiratory: no accessory muscle use, breathing comfortably  Cardiovascular: distant  Gastrointestinal: normal appearing, nondistended  Extremities: no clubbing, no cyanosis,        LABS:                          9.8    12.15 )-----------( 405      ( 06 Nov 2024 08:15 )             31.2       WBC  12.15 11-06 @ 08:15  8.71 11-04 @ 07:55  10.33 11-03 @ 08:00  9.79 11-01 @ 11:19      11-06    137  |  105  |  11  ----------------------------<  185[H]  3.7   |  27  |  0.94    Ca    9.4      06 Nov 2024 08:15        Creatinine: 0.94 mg/dL (11-06-24 @ 08:15)  Creatinine: 0.87 mg/dL (11-04-24 @ 07:55)  Creatinine: 0.79 mg/dL (11-03-24 @ 08:00)  Creatinine: 0.84 mg/dL (11-01-24 @ 11:19)        Urinalysis Basic - ( 06 Nov 2024 08:15 )    Color: x / Appearance: x / SG: x / pH: x  Gluc: 185 mg/dL / Ketone: x  / Bili: x / Urobili: x   Blood: x / Protein: x / Nitrite: x   Leuk Esterase: x / RBC: x / WBC x   Sq Epi: x / Non Sq Epi: x / Bacteria: x            INFLAMMATORY MARKERS      MICROBIOLOGY:    Culture - Tissue with Gram Stain (11.05.24 @ 12:00)    Gram Stain:   Rare polymorphonuclear leukocytes per low power field  No organisms seen per oil power field   Specimen Source: Tissue   Culture Results:   Rare Gram Negative Rods        RADIOLOGY & ADDITIONAL STUDIES:

## 2024-11-07 NOTE — PROGRESS NOTE ADULT - PROVIDER SPECIALTY LIST ADULT
Cardiology
Infectious Disease
Infectious Disease
Cardiology
Family Medicine
Infectious Disease
Cardiology
Infectious Disease
Podiatry
Family Medicine
Podiatry
Podiatry
Family Medicine
Family Medicine
Internal Medicine
Hospitalist
Family Medicine

## 2024-11-07 NOTE — SOCIAL WORK PROGRESS NOTE - NSSWPROGRESSNOTE_GEN_ALL_CORE
pt for dc back to [facility] today. Ambulette to  pt at 4:30 pm. Pt in agreement. All paperwork to accompany pt. No [facility] services indicated at this time. Plan: DC back to [facility] today for 4:30 pm via ambulette.

## 2024-11-10 LAB
CULTURE RESULTS: ABNORMAL
ORGANISM # SPEC MICROSCOPIC CNT: ABNORMAL
ORGANISM # SPEC MICROSCOPIC CNT: ABNORMAL
ORGANISM # SPEC MICROSCOPIC CNT: SIGNIFICANT CHANGE UP
SPECIMEN SOURCE: SIGNIFICANT CHANGE UP

## 2024-11-19 NOTE — PROGRESS NOTE ADULT - SUBJECTIVE AND OBJECTIVE BOX
Problem: PAIN - ADULT  Goal: Verbalizes/displays adequate comfort level or baseline comfort level  Description: Interventions:  - Encourage patient to monitor pain and request assistance  - Assess pain using appropriate pain scale  - Administer analgesics based on type and severity of pain and evaluate response  - Implement non-pharmacological measures as appropriate and evaluate response  - Consider cultural and social influences on pain and pain management  - Notify physician/advanced practitioner if interventions unsuccessful or patient reports new pain  Outcome: Progressing     Problem: INFECTION - ADULT  Goal: Absence or prevention of progression during hospitalization  Description: INTERVENTIONS:  - Assess and monitor for signs and symptoms of infection  - Monitor lab/diagnostic results  - Monitor all insertion sites, i.e. indwelling lines, tubes, and drains  - Monitor endotracheal if appropriate and nasal secretions for changes in amount and color  - [place] appropriate cooling/warming therapies per order  - Administer medications as ordered  - Instruct and encourage patient and family to use good hand hygiene technique  - Identify and instruct in appropriate isolation precautions for identified infection/condition  Outcome: Progressing  Goal: Absence of fever/infection during neutropenic period  Description: INTERVENTIONS:  - Monitor WBC    Outcome: Progressing     Problem: SAFETY ADULT  Goal: Patient will remain free of falls  Description: INTERVENTIONS:  - Educate patient/family on patient safety including physical limitations  - Instruct patient to call for assistance with activity   - Consult OT/PT to assist with strengthening/mobility   - Keep Call bell within reach  - Keep bed low and locked with side rails adjusted as appropriate  - Keep care items and personal belongings within reach  - Initiate and maintain comfort rounds  - Make Fall Risk Sign visible to staff  - Offer Toileting every 2 Hours,  47yo male seen bedside during podiatry rounds. s/p left hallux amputation and metatarsal head resection. Awaiting discharge      Vital Signs Last 24 Hrs  T(C): 36.5 (04 Jun 2019 08:37), Max: 37.1 (03 Jun 2019 15:30)  T(F): 97.7 (04 Jun 2019 08:37), Max: 98.7 (03 Jun 2019 15:30)  HR: 57 (04 Jun 2019 08:37) (52 - 92)  BP: 102/69 (04 Jun 2019 08:37) (102/69 - 136/79)  BP(mean): --  RR: 18 (04 Jun 2019 08:37) (18 - 18)  SpO2: 94% (04 Jun 2019 08:37) (93% - 100%)    06-04    142  |  106  |  15  ----------------------------<  120<H>  3.9   |  29  |  1.20    Ca    8.4<L>      04 Jun 2019 07:24    CBC Full  -  ( 04 Jun 2019 07:24 )  WBC Count : 5.93 K/uL  RBC Count : 2.84 M/uL  Hemoglobin : 8.7 g/dL  Hematocrit : 27.7 %  Platelet Count - Automated : 344 K/uL  Mean Cell Volume : 97.5 fl  Mean Cell Hemoglobin : 30.6 pg  Mean Cell Hemoglobin Concentration : 31.4 gm/dL  Auto Neutrophil # : x  Auto Lymphocyte # : x  Auto Monocyte # : x  Auto Eosinophil # : x  Auto Basophil # : x  Auto Neutrophil % : x  Auto Lymphocyte % : x  Auto Monocyte % : x  Auto Eosinophil % : x  Auto Basophil % : x      Objective   Vascular: DP/PT palpable, CFT<3, Temp gradient warm to cool pedal hair absent, no edema present at this time   Derm: left foot sutures intact no dehiscence post surgical erythema no edema   Musc: hammer toe deformity 2-5 with overlapping of digits, pes planus, HAV deformity   Neuro: protective sensation absent in advance of need  - Initiate/Maintain alarm  - Obtain necessary fall risk management equipment:   - Apply yellow socks and bracelet for high fall risk patients  - Consider moving patient to room near nurses station  Outcome: Progressing  Goal: Maintain or return to baseline ADL function  Description: INTERVENTIONS:  -  Assess patient's ability to carry out ADLs; assess patient's baseline for ADL function and identify physical deficits which impact ability to perform ADLs (bathing, care of mouth/teeth, toileting, grooming, dressing, etc.)  - Assess/evaluate cause of self-care deficits   - Assess range of motion  - Assess patient's mobility; develop plan if impaired  - Assess patient's need for assistive devices and provide as appropriate  - Encourage maximum independence but intervene and supervise when necessary  - Involve family in performance of ADLs  - Assess for home care needs following discharge   - Consider OT consult to assist with ADL evaluation and planning for discharge  - Provide patient education as appropriate  Outcome: Progressing  Goal: Maintains/Returns to pre admission functional level  Description: INTERVENTIONS:  - Perform AM-PAC 6 Click Basic Mobility/ Daily Activity assessment daily.  - Set and communicate daily mobility goal to care team and patient/family/caregiver.   - Collaborate with rehabilitation services on mobility goals if consulted  - Perform Range of Motion  times a day.  - Reposition patient every 2 hours.  - Dangle patient 3 times a day  - Stand patient 3 times a day  - Ambulate patient 3 times a day  - Out of bed to chair 3 times a day   - Out of bed for meals 3 times a day  - Out of bed for toileting  - Record patient progress and toleration of activity level   Outcome: Progressing     Problem: DISCHARGE PLANNING  Goal: Discharge to home or other facility with appropriate resources  Description: INTERVENTIONS:  - Identify barriers to discharge w/patient and caregiver  -  Arrange for needed discharge resources and transportation as appropriate  - Identify discharge learning needs (meds, wound care, etc.)  - Arrange for interpretive services to assist at discharge as needed  - Refer to Case Management Department for coordinating discharge planning if the patient needs post-hospital services based on physician/advanced practitioner order or complex needs related to functional status, cognitive ability, or social support system  Outcome: Progressing     Problem: Knowledge Deficit  Goal: Patient/family/caregiver demonstrates understanding of disease process, treatment plan, medications, and discharge instructions  Description: Complete learning assessment and assess knowledge base.  Interventions:  - Provide teaching at level of understanding  - Provide teaching via preferred learning methods  Outcome: Progressing

## 2024-11-20 ENCOUNTER — APPOINTMENT (OUTPATIENT)
Dept: WOUND CARE | Facility: HOSPITAL | Age: 54
End: 2024-11-20
Payer: MEDICAID

## 2024-11-20 ENCOUNTER — OUTPATIENT (OUTPATIENT)
Dept: OUTPATIENT SERVICES | Facility: HOSPITAL | Age: 54
LOS: 1 days | Discharge: HOME | End: 2024-11-20
Payer: MEDICAID

## 2024-11-20 VITALS
DIASTOLIC BLOOD PRESSURE: 65 MMHG | HEIGHT: 74 IN | SYSTOLIC BLOOD PRESSURE: 98 MMHG | HEART RATE: 64 BPM | OXYGEN SATURATION: 95 % | BODY MASS INDEX: 28.23 KG/M2 | RESPIRATION RATE: 18 BRPM | WEIGHT: 220 LBS | TEMPERATURE: 98.3 F

## 2024-11-20 DIAGNOSIS — K25.5 CHRONIC OR UNSPECIFIED GASTRIC ULCER WITH PERFORATION: Chronic | ICD-10-CM

## 2024-11-20 DIAGNOSIS — L02.611 CUTANEOUS ABSCESS OF RIGHT FOOT: ICD-10-CM

## 2024-11-20 DIAGNOSIS — S98.912A COMPLETE TRAUMATIC AMPUTATION OF LEFT FOOT, LEVEL UNSPECIFIED, INITIAL ENCOUNTER: Chronic | ICD-10-CM

## 2024-11-20 DIAGNOSIS — Z98.890 OTHER SPECIFIED POSTPROCEDURAL STATES: Chronic | ICD-10-CM

## 2024-11-20 PROCEDURE — G0463: CPT

## 2024-11-20 PROCEDURE — 99203 OFFICE O/P NEW LOW 30 MIN: CPT

## 2024-11-20 PROCEDURE — 99213 OFFICE O/P EST LOW 20 MIN: CPT

## 2024-11-20 NOTE — HISTORY OF PRESENT ILLNESS
[FreeTextEntry1] : 53 yo WM, here for assessment of pressure ulcers involving his left hip and bilateral buttocks. I discussed the importance of offloading/frequent rotation/change of position q 1-2 hrs in bed and in the W/c. Has a small dry DTI on his right medial LE also. No SOI. His heel press ulcer being tx today by podiatrist, Dr. Howard.

## 2024-11-20 NOTE — ASSESSMENT
[Verbal] : Verbal [Written] : Written [Demo] : Demo [Patient] : Patient [Caregiver] : Caregiver [Good - alert, interested, motivated] : Good - alert, interested, motivated [Needs reinforcement] : needs reinforcement [Dressing changes] : dressing changes [Skin Care] : skin care [Pressure relief] : pressure relief [Signs and symptoms of infection] : sign and symptoms of infection [Nutrition] : nutrition [How and When to Call] : how and when to call [Pain Management] : pain management [Patient responsibility to plan of care] : patient responsibility to plan of care [Glycemic Control] : glycemic control [] : No [FreeTextEntry2] : Infection prevention Localized wound care  Goal remaining pain free regarding wounds Frequent turning and repositioning.   [FreeTextEntry3] : Pt presents with new wounds.  [FreeTextEntry4] : Education reinforced on the importance of frequent turning and repositioning at least every 2 hours. Pt currently has a low air mattress and RoHo cushion. MD and RN provided clear instructions to continue with pressure relief strategies.    Supply script provided to patients accompanied health aide as well as WC instructions.  Follow up in 1 week  [Stable] : stable [Other: ____] : [unfilled] [Wheelchair] : Wheelchair [Not Applicable - Long Term Care/Home Health Agency] : Long Term Care/Home Health Agency: Not Applicable

## 2024-11-20 NOTE — PHYSICAL EXAM
[Normal Thyroid] : the thyroid was normal [Normal Breath Sounds] : Normal breath sounds [Normal Heart Sounds] : normal heart sounds [Normal Rate and Rhythm] : normal rate and rhythm [Alert] : alert [Calm] : calm [Oriented to Person] : oriented to person [Oriented to Place] : oriented to place [Oriented to Time] : oriented to time [JVD] : no jugular venous distention  [Abdomen Masses] : No abdominal massess [Abdomen Tenderness] : ~T ~M No abdominal tenderness [Tender] : nontender [Enlarged] : not enlarged [de-identified] : adult WM, NAD, alert, Ox3 [4 x 4] : 4 x 4  [FreeTextEntry1] : Right medial leg - Fragile epithelial tissue  [FreeTextEntry2] : 2.2 [FreeTextEntry3] : 0.5 [FreeTextEntry4] : 0.1 [de-identified] : dry dressing  [de-identified] : Mechanically cleansed with sterile gauze and normal saline. Cloth tape  [FreeTextEntry7] : Left hip (New)  [FreeTextEntry8] : 0.4 [FreeTextEntry9] : 0.4 [de-identified] : 0.5  [de-identified] : Serous/sanguinous [de-identified] : 0.5cm @ 9 o 'clock  [de-identified] : Allevyn pad  [de-identified] : Mechanically cleansed with sterile gauze and normal saline. Cloth tape  [de-identified] : Left buttocks ( New)  [de-identified] : 0.5 [de-identified] : 2.2 [de-identified] : 0.1 [de-identified] : Serous/sanguinous [de-identified] : Excoriation  [de-identified] : Allevyn Ag  [de-identified] : Mechanically cleansed with sterile gauze and normal saline. Kerlix  [de-identified] : Right buttocks (New)  [de-identified] : 0.5 [de-identified] : 0.8 [de-identified] : 0.1 [de-identified] : Serous/sanguinous [de-identified] : Excoriation  [de-identified] : Allevyn Ag  [de-identified] : Mechanically cleansed with sterile gauze and normal saline. Cloth tape  [TWNoteComboBox4] : None [de-identified] : Normal [de-identified] : None [de-identified] : None [de-identified] : No [de-identified] : 3x Weekly [de-identified] : Secondary Dressing [de-identified] : Moderate [de-identified] : Yes [de-identified] : Normal [de-identified] : None [de-identified] : None [de-identified] : >75% [de-identified] : No [de-identified] : 3x Weekly [de-identified] : Primary Dressing [de-identified] : Small [de-identified] : other [de-identified] : None [de-identified] : None [de-identified] : 100% [de-identified] : No [de-identified] : 3x Weekly [de-identified] : Primary Dressing [de-identified] : Small [de-identified] : other [de-identified] : None [de-identified] : None [de-identified] : 100% [de-identified] : No [de-identified] : 3x Weekly [de-identified] : Primary Dressing

## 2024-11-20 NOTE — PLAN
[FreeTextEntry1] : offload; frequent rotation/change of position Roho cushion allevyn ag to left hip/bilat. buttocks qod DD to RLE qod F/u- 1 wk  time spent-  25 mins.

## 2024-11-20 NOTE — REVIEW OF SYSTEMS
[Skin Wound] : skin wound [Limb Weakness] : limb weakness [Difficulty Walking] : difficulty walking [Negative] : Musculoskeletal

## 2024-11-24 DIAGNOSIS — E56.9 VITAMIN DEFICIENCY, UNSPECIFIED: ICD-10-CM

## 2024-11-24 DIAGNOSIS — E11.40 TYPE 2 DIABETES MELLITUS WITH DIABETIC NEUROPATHY, UNSPECIFIED: ICD-10-CM

## 2024-11-24 DIAGNOSIS — Z79.899 OTHER LONG TERM (CURRENT) DRUG THERAPY: ICD-10-CM

## 2024-11-24 DIAGNOSIS — L97.413 NON-PRESSURE CHRONIC ULCER OF RIGHT HEEL AND MIDFOOT WITH NECROSIS OF MUSCLE: ICD-10-CM

## 2024-11-24 DIAGNOSIS — E87.6 HYPOKALEMIA: ICD-10-CM

## 2024-11-24 DIAGNOSIS — E11.59 TYPE 2 DIABETES MELLITUS WITH OTHER CIRCULATORY COMPLICATIONS: ICD-10-CM

## 2024-11-24 DIAGNOSIS — D64.9 ANEMIA, UNSPECIFIED: ICD-10-CM

## 2024-11-24 DIAGNOSIS — Z79.84 LONG TERM (CURRENT) USE OF ORAL HYPOGLYCEMIC DRUGS: ICD-10-CM

## 2024-11-24 DIAGNOSIS — L89.223 PRESSURE ULCER OF LEFT HIP, STAGE 3: ICD-10-CM

## 2024-11-24 DIAGNOSIS — Z87.440 PERSONAL HISTORY OF URINARY (TRACT) INFECTIONS: ICD-10-CM

## 2024-11-24 DIAGNOSIS — K59.00 CONSTIPATION, UNSPECIFIED: ICD-10-CM

## 2024-11-24 DIAGNOSIS — L89.322 PRESSURE ULCER OF LEFT BUTTOCK, STAGE 2: ICD-10-CM

## 2024-11-24 DIAGNOSIS — Z89.432 ACQUIRED ABSENCE OF LEFT FOOT: ICD-10-CM

## 2024-11-24 DIAGNOSIS — Z86.73 PERSONAL HISTORY OF TRANSIENT ISCHEMIC ATTACK (TIA), AND CEREBRAL INFARCTION WITHOUT RESIDUAL DEFICITS: ICD-10-CM

## 2024-11-24 DIAGNOSIS — I25.5 ISCHEMIC CARDIOMYOPATHY: ICD-10-CM

## 2024-11-24 DIAGNOSIS — E11.621 TYPE 2 DIABETES MELLITUS WITH FOOT ULCER: ICD-10-CM

## 2024-11-24 DIAGNOSIS — Z79.01 LONG TERM (CURRENT) USE OF ANTICOAGULANTS: ICD-10-CM

## 2024-11-24 DIAGNOSIS — Z87.891 PERSONAL HISTORY OF NICOTINE DEPENDENCE: ICD-10-CM

## 2024-11-24 NOTE — ASSESSMENT
[Other: ____] : [unfilled] [Verbal] : Verbal [Written] : Written [Demo] : Demo [Patient] : Patient [Caregiver] : Caregiver [Good - alert, interested, motivated] : Good - alert, interested, motivated [Needs reinforcement] : needs reinforcement [Dressing changes] : dressing changes [Foot Care] : foot care [Skin Care] : skin care [Signs and symptoms of infection] : sign and symptoms of infection [Nutrition] : nutrition [How and When to Call] : how and when to call [Pain Management] : pain management [Off-loading] : off-loading [Patient responsibility to plan of care] : patient responsibility to plan of care [Glycemic Control] : glycemic control [Stable] : stable [Wheelchair] : Wheelchair [Not Applicable - Long Term Care/Home Health Agency] : Long Term Care/Home Health Agency: Not Applicable [] : No [FreeTextEntry2] : Infection prevention Localized wound care  Goal remaining pain free regarding wounds Offloading   Promote optimal skin care and nutrition. [FreeTextEntry3] : Pt presents with new wound and is post op procedure.  [FreeTextEntry4] : Instructions clearly written by MANISH, MD, and RN on all instructions to practice strict heel offloading as well as frequent turning and repositioning.  Pt was recently in [facility] 10/30/24 to 11/7/24 for right heel abscess/infection. I & D performed during stay. Pt has finished all antibiotics. Supply script provided to patients accompanied health aide along with WC instructions. Small amount of supplies provided for patient.   Follow up in 1 week

## 2024-11-24 NOTE — HISTORY OF PRESENT ILLNESS
[FreeTextEntry1] : Patient is 54 year old male presenting for f/u s/p right heel partial calcanectomy as on 09/10/24. Patient with (+) OM acute and chronic on pathology. Patient was recently discharged from [facility] for abscess to the right midfoot and underwent incision and drainage in the OR. Patient is currently in rehab and is in wheel chair with being non weight bearing to the right lower extremity.

## 2024-11-24 NOTE — PHYSICAL EXAM
[Abdominal Pad] : Abdominal Pad [4 x 4] : 4 x 4  [1+] : left 1+ [Ankle Swelling (On Exam)] : present [Ankle Swelling On The Left] : moderate [Varicose Veins Of Lower Extremities] : not present [] : not present [Skin Ulcer] : ulcer [Alert] : alert [Oriented to Person] : oriented to person [Oriented to Place] : oriented to place [Calm] : calm [de-identified] : calm [de-identified] : s/p left TMA and Achilles tenotomy, pt has been non ambulatory since february  [de-identified] : left foot TMA - closed.  Right posterior heel wound of skin, subcutaneous tissue, fat  and fascia with serous drainage  [de-identified] : IDDM with neuropathy  [FreeTextEntry2] : 15.5 [FreeTextEntry4] : 0.5 [FreeTextEntry3] : 4.5 [de-identified] : sanguinous [de-identified] : 1.5cm from 7 - 8 o'clock  [de-identified] : 15%  [de-identified] : Silver alginate [de-identified] : Mechanically cleansed with sterile gauze and normal saline. Kerlix  [FreeTextEntry8] : 1.4 [FreeTextEntry9] : 1.5 [de-identified] : 0.1  [de-identified] : Serous/sanguinous [de-identified] : 50%  [de-identified] : 0.6 [de-identified] : 0.6 [de-identified] : 0.1 [de-identified] : Serous/sanguinous [de-identified] : Silver alginate [de-identified] : Mechanically cleansed with sterile gauze and normal saline. Kerlix  [FreeTextEntry7] : Left hip (See Dr. Small note)  [FreeTextEntry1] : Right medial leg (See Dr. Small note)  [de-identified] : Left buttocks (See Dr. Small note)  [de-identified] : Right buttocks (See Dr. Small note)  [TWNoteComboBox4] : Large [TWNoteComboBox6] : Surgical [de-identified] : Yes [de-identified] : Normal [de-identified] : None [de-identified] : None [de-identified] : >75% [de-identified] : Yes [de-identified] : Daily [de-identified] : Primary Dressing [de-identified] : Small [de-identified] : Pressure [de-identified] : Macerated [de-identified] : None [de-identified] : None [de-identified] : 50% [de-identified] : Yes [de-identified] : Moderate [de-identified] : Normal [de-identified] : None [de-identified] : None [de-identified] : 100% [de-identified] : No [de-identified] : 3x Weekly [de-identified] : Primary Dressing

## 2024-11-24 NOTE — ASSESSMENT
[Other: ____] : [unfilled] [Verbal] : Verbal [Written] : Written [Demo] : Demo [Patient] : Patient [Caregiver] : Caregiver [Good - alert, interested, motivated] : Good - alert, interested, motivated [Needs reinforcement] : needs reinforcement [Dressing changes] : dressing changes [Foot Care] : foot care [Skin Care] : skin care [Signs and symptoms of infection] : sign and symptoms of infection [Nutrition] : nutrition [How and When to Call] : how and when to call [Pain Management] : pain management [Off-loading] : off-loading [Patient responsibility to plan of care] : patient responsibility to plan of care [Glycemic Control] : glycemic control [Stable] : stable [Wheelchair] : Wheelchair [Not Applicable - Long Term Care/Home Health Agency] : Long Term Care/Home Health Agency: Not Applicable [] : No [FreeTextEntry2] : Infection prevention Localized wound care  Goal remaining pain free regarding wounds Offloading   Promote optimal skin care and nutrition. [FreeTextEntry3] : Pt presents with new wound and is post op procedure.  [FreeTextEntry4] : Instructions clearly written by MANISH, MD, and RN on all instructions to practice strict heel offloading as well as frequent turning and repositioning.  Pt was recently in [facility] 10/30/24 to 11/7/24 for right heel abscess/infection. I & D performed during stay. Pt has finished all antibiotics. Supply script provided to patients accompanied health aide along with WC instructions. Small amount of supplies provided for patient.   Follow up in 1 week

## 2024-11-24 NOTE — HISTORY OF PRESENT ILLNESS
[FreeTextEntry1] : Patient is 54 year old male presenting for f/u s/p right heel partial calcanectomy as on 09/10/24. Patient with (+) OM acute and chronic on pathology. Patient was recently discharged from [facility] for abscess to the right midfoot and underwent incision and drainage in the OR. Patient is currently in rehab and is in wheel chair with being non weight bearing to the right lower extremity.

## 2024-11-24 NOTE — VITALS
[Pain related to present condition?] : The patient's  pain is related to present condition. [Occasional] : occasional [] : No [de-identified] : 6/10  [FreeTextEntry3] : Right heel  [FreeTextEntry1] : Padded dressing  [FreeTextEntry4] : Thick padding added to area.  [FreeTextEntry2] : Direct pressure

## 2024-11-24 NOTE — VITALS
[Pain related to present condition?] : The patient's  pain is related to present condition. [Occasional] : occasional [] : No [de-identified] : 6/10  [FreeTextEntry3] : Right heel  [FreeTextEntry1] : Padded dressing  [FreeTextEntry2] : Direct pressure  [FreeTextEntry4] : Thick padding added to area.

## 2024-11-24 NOTE — REVIEW OF SYSTEMS
[Fever] : no fever [Eye Pain] : no eye pain [Chills] : no chills [Earache] : no earache [Loss Of Hearing] : no hearing loss [Chest Pain] : no chest pain [Shortness Of Breath] : no shortness of breath [Abdominal Pain] : no abdominal pain [Skin Wound] : skin wound [Limb Weakness] : limb weakness [Anxiety] : no anxiety [Easy Bleeding] : a tendency for easy bleeding [FreeTextEntry5] : htn, hld diabetic cardio vascular disease  [FreeTextEntry9] : s/p left foot TMA w/Achilles tenotomy [de-identified] : left foot DFU3, s/p TMA-  healed, right heel wound down to bone , s/p partial calcanectomy with (+) OM on path [de-identified] : diabetic neuropathy [de-identified] : IDDM with neuropathy

## 2024-11-24 NOTE — PHYSICAL EXAM
[Abdominal Pad] : Abdominal Pad [4 x 4] : 4 x 4  [1+] : left 1+ [Ankle Swelling (On Exam)] : present [Varicose Veins Of Lower Extremities] : not present [Ankle Swelling On The Left] : moderate [] : not present [Skin Ulcer] : ulcer [Alert] : alert [Oriented to Person] : oriented to person [Oriented to Place] : oriented to place [Calm] : calm [de-identified] : calm [de-identified] : s/p left TMA and Achilles tenotomy, pt has been non ambulatory since february  [de-identified] : left foot TMA - closed.  Right posterior heel wound of skin, subcutaneous tissue, fat  and fascia with serous drainage  [de-identified] : IDDM with neuropathy  [FreeTextEntry2] : 15.5 [FreeTextEntry3] : 4.5 [FreeTextEntry4] : 0.5 [de-identified] : sanguinous [de-identified] : 1.5cm from 7 - 8 o'clock  [de-identified] : 15%  [de-identified] : Silver alginate [de-identified] : Mechanically cleansed with sterile gauze and normal saline. Kerlix  [FreeTextEntry8] : 1.4 [FreeTextEntry9] : 1.5 [de-identified] : 0.1  [de-identified] : Serous/sanguinous [de-identified] : 50%  [de-identified] : 0.6 [de-identified] : 0.6 [de-identified] : 0.1 [de-identified] : Serous/sanguinous [de-identified] : Silver alginate [de-identified] : Mechanically cleansed with sterile gauze and normal saline. Kerlix  [FreeTextEntry7] : Left hip (See Dr. Small note)  [FreeTextEntry1] : Right medial leg (See Dr. Small note)  [de-identified] : Left buttocks (See Dr. Small note)  [de-identified] : Right buttocks (See Dr. Small note)  [TWNoteComboBox4] : Large [TWNoteComboBox6] : Surgical [de-identified] : Yes [de-identified] : Normal [de-identified] : None [de-identified] : None [de-identified] : >75% [de-identified] : Yes [de-identified] : Daily [de-identified] : Primary Dressing [de-identified] : Small [de-identified] : Pressure [de-identified] : Macerated [de-identified] : None [de-identified] : None [de-identified] : 50% [de-identified] : Yes [de-identified] : Moderate [de-identified] : None [de-identified] : Normal [de-identified] : None [de-identified] : 100% [de-identified] : No [de-identified] : 3x Weekly [de-identified] : Primary Dressing

## 2024-11-24 NOTE — PLAN
[FreeTextEntry1] : .Patient seen and evaluated. Discussed etiology and treatment plan. Patient verbalized understanding. C/w local wound care and offloading. Patient is currently in rehab and will return in one week. Patient is high risk for further surgical intervention, sepsis, loss of limb and loss of life.  Spent 20 minutes for patient care and medical decision making.

## 2024-11-24 NOTE — REVIEW OF SYSTEMS
[Fever] : no fever [Eye Pain] : no eye pain [Chills] : no chills [Earache] : no earache [Loss Of Hearing] : no hearing loss [Chest Pain] : no chest pain [Shortness Of Breath] : no shortness of breath [Abdominal Pain] : no abdominal pain [Skin Wound] : skin wound [Limb Weakness] : limb weakness [Anxiety] : no anxiety [Easy Bleeding] : a tendency for easy bleeding [FreeTextEntry5] : htn, hld diabetic cardio vascular disease  [FreeTextEntry9] : s/p left foot TMA w/Achilles tenotomy [de-identified] : left foot DFU3, s/p TMA-  healed, right heel wound down to bone , s/p partial calcanectomy with (+) OM on path [de-identified] : diabetic neuropathy [de-identified] : IDDM with neuropathy

## 2024-11-27 ENCOUNTER — APPOINTMENT (OUTPATIENT)
Dept: WOUND CARE | Facility: HOSPITAL | Age: 54
End: 2024-11-27
Payer: MEDICAID

## 2024-11-27 ENCOUNTER — OUTPATIENT (OUTPATIENT)
Dept: OUTPATIENT SERVICES | Facility: HOSPITAL | Age: 54
LOS: 1 days | Discharge: HOME | End: 2024-11-27
Payer: MEDICAID

## 2024-11-27 VITALS
RESPIRATION RATE: 18 BRPM | TEMPERATURE: 98.7 F | DIASTOLIC BLOOD PRESSURE: 79 MMHG | HEIGHT: 74 IN | HEART RATE: 73 BPM | WEIGHT: 220 LBS | SYSTOLIC BLOOD PRESSURE: 129 MMHG | OXYGEN SATURATION: 95 % | BODY MASS INDEX: 28.23 KG/M2

## 2024-11-27 DIAGNOSIS — L97.413 NON-PRESSURE CHRONIC ULCER OF RIGHT HEEL AND MIDFOOT WITH NECROSIS OF MUSCLE: ICD-10-CM

## 2024-11-27 DIAGNOSIS — L97.509 TYPE 2 DIABETES MELLITUS WITH FOOT ULCER: ICD-10-CM

## 2024-11-27 DIAGNOSIS — L89.312 PRESSURE ULCER OF RIGHT BUTTOCK, STAGE 2: ICD-10-CM

## 2024-11-27 DIAGNOSIS — E11.621 TYPE 2 DIABETES MELLITUS WITH FOOT ULCER: ICD-10-CM

## 2024-11-27 DIAGNOSIS — L89.322 PRESSURE ULCER OF LEFT BUTTOCK, STAGE 2: ICD-10-CM

## 2024-11-27 DIAGNOSIS — S98.912A COMPLETE TRAUMATIC AMPUTATION OF LEFT FOOT, LEVEL UNSPECIFIED, INITIAL ENCOUNTER: Chronic | ICD-10-CM

## 2024-11-27 DIAGNOSIS — K25.5 CHRONIC OR UNSPECIFIED GASTRIC ULCER WITH PERFORATION: Chronic | ICD-10-CM

## 2024-11-27 DIAGNOSIS — Z98.890 OTHER SPECIFIED POSTPROCEDURAL STATES: Chronic | ICD-10-CM

## 2024-11-27 DIAGNOSIS — L02.611 CUTANEOUS ABSCESS OF RIGHT FOOT: ICD-10-CM

## 2024-11-27 DIAGNOSIS — L89.223 PRESSURE ULCER OF LEFT HIP, STAGE 3: ICD-10-CM

## 2024-11-27 PROCEDURE — 99213 OFFICE O/P EST LOW 20 MIN: CPT

## 2024-11-27 PROCEDURE — G0463: CPT

## 2024-11-27 NOTE — PLAN
[FreeTextEntry1] : offload; frequent rotation/change of position. allevyn ag to left hip 3x a wk allevyn to both buttocks 3x a wk. f/u 1 wk  time spent 25 mins.

## 2024-11-27 NOTE — HISTORY OF PRESENT ILLNESS
FACILITY: [facility] 

 

PATIENT NAME: Adilia Lassiter

: 1997

MR: [id number]

V: [id number]

EXAM DATE: 

ORDERING PHYSICIAN: DOUGLAS WALDRON

TECHNOLOGIST: 

 

Location: [facility] [place]

Patient: Adilia Lassiter

: 1997

MRN: [id number]

Visit/Account:[id number]

Date of Sevice:  2019

 

ACCESSION #: [id number]

 

CT BRAIN WITH AND WITHOUT

 

COMPARISONS:  None.

 

ADDITIONAL PERTINENT HISTORY:  Frequent headaches

 

TECHNIQUE:  Multiple axial images were obtained from the skull base to the vertex before and after th
e IV administration of contrast.  One of the following dose optimization techniques was utilized in t
he performance of this exam: Automated exposure control; adjustment of the mA and/or kV according to 
the patient's size; or use of an iterative  reconstruction technique.  Specific details can be refere
nced in the facility's radiology CT exam operational policy.

 

CONTRAST:  75 ml of Isovue-370

 

FINDINGS:

 

Midline shift: Negative

 

Ventricles:  Negative

 

Brain parenchyma:  Negative

 

Extra-axial spaces:  Negative

 

Pathologic enhancement:  Negative

 

Intracranial vasculature:  Negative

 

Osseous structures:  Negative

 

Paranasal sinuses and mastoid air cells:  Negative

 

Surrounding soft tissues and orbits:  Negative

 

 

IMPRESSION: Normal head CT scan with and without contrast.

 

Report Dictated By: Jaskaran Cartagena MD at 2019 12:12 PM

 

Report E-Signed By: Jaskaran Cartagena MD  at 2019 12:16 PM

 

WSN:DS2HI [FreeTextEntry1] : 55 yo WM, here for assessment of pressure ulcers involving his left hip and bilateral buttocks. I discussed the importance of offloading/frequent rotation/change of position q 1-2 hrs in bed and in the W/c. Only scattered excoriations seen in bilat buttocks and a small ulcer remains in the left hip with slight improvement.  No SOI. His heel press ulcer being tx today by podiatrist, Dr. Howard.

## 2024-11-27 NOTE — REVIEW OF SYSTEMS
[Skin Wound] : skin wound [Limb Weakness] : limb weakness [Difficulty Walking] : difficulty walking [Negative] : Gastrointestinal

## 2024-11-27 NOTE — ASSESSMENT
[] : No [FreeTextEntry2] : Infection Prevention Wound care and Dressing changes Promote and Restore optimal skin integrity Nutrition and Wound Healing  Offloading and Pressure relief Protect and Guard wound site  Maintain acceptable levels of Pain Compliance R/T Elevation of Lower Extremities  [FreeTextEntry4] : PT  DOUBLE ENCOUNTER. MD assessed wound site  Patient verbalized understanding of all discussed. Patient to return to WCC in One week Facility paperwork completed and given to Care Aide, along with WCC Instructions.

## 2024-12-01 DIAGNOSIS — E11.69 TYPE 2 DIABETES MELLITUS WITH OTHER SPECIFIED COMPLICATION: ICD-10-CM

## 2024-12-01 DIAGNOSIS — E11.40 TYPE 2 DIABETES MELLITUS WITH DIABETIC NEUROPATHY, UNSPECIFIED: ICD-10-CM

## 2024-12-01 DIAGNOSIS — L89.322 PRESSURE ULCER OF LEFT BUTTOCK, STAGE 2: ICD-10-CM

## 2024-12-01 DIAGNOSIS — L89.223 PRESSURE ULCER OF LEFT HIP, STAGE 3: ICD-10-CM

## 2024-12-01 DIAGNOSIS — E56.9 VITAMIN DEFICIENCY, UNSPECIFIED: ICD-10-CM

## 2024-12-01 DIAGNOSIS — D64.9 ANEMIA, UNSPECIFIED: ICD-10-CM

## 2024-12-01 DIAGNOSIS — L97.413 NON-PRESSURE CHRONIC ULCER OF RIGHT HEEL AND MIDFOOT WITH NECROSIS OF MUSCLE: ICD-10-CM

## 2024-12-01 DIAGNOSIS — Z87.891 PERSONAL HISTORY OF NICOTINE DEPENDENCE: ICD-10-CM

## 2024-12-01 DIAGNOSIS — K59.00 CONSTIPATION, UNSPECIFIED: ICD-10-CM

## 2024-12-01 DIAGNOSIS — I25.2 OLD MYOCARDIAL INFARCTION: ICD-10-CM

## 2024-12-01 DIAGNOSIS — M86.171 OTHER ACUTE OSTEOMYELITIS, RIGHT ANKLE AND FOOT: ICD-10-CM

## 2024-12-01 DIAGNOSIS — Z79.899 OTHER LONG TERM (CURRENT) DRUG THERAPY: ICD-10-CM

## 2024-12-01 DIAGNOSIS — Z79.84 LONG TERM (CURRENT) USE OF ORAL HYPOGLYCEMIC DRUGS: ICD-10-CM

## 2024-12-01 DIAGNOSIS — Z79.01 LONG TERM (CURRENT) USE OF ANTICOAGULANTS: ICD-10-CM

## 2024-12-01 DIAGNOSIS — Z99.3 DEPENDENCE ON WHEELCHAIR: ICD-10-CM

## 2024-12-01 DIAGNOSIS — Z87.440 PERSONAL HISTORY OF URINARY (TRACT) INFECTIONS: ICD-10-CM

## 2024-12-01 DIAGNOSIS — M86.671 OTHER CHRONIC OSTEOMYELITIS, RIGHT ANKLE AND FOOT: ICD-10-CM

## 2024-12-01 DIAGNOSIS — E87.6 HYPOKALEMIA: ICD-10-CM

## 2024-12-01 DIAGNOSIS — E11.59 TYPE 2 DIABETES MELLITUS WITH OTHER CIRCULATORY COMPLICATIONS: ICD-10-CM

## 2024-12-01 DIAGNOSIS — Z89.432 ACQUIRED ABSENCE OF LEFT FOOT: ICD-10-CM

## 2024-12-01 DIAGNOSIS — Z86.73 PERSONAL HISTORY OF TRANSIENT ISCHEMIC ATTACK (TIA), AND CEREBRAL INFARCTION WITHOUT RESIDUAL DEFICITS: ICD-10-CM

## 2024-12-01 NOTE — HISTORY OF PRESENT ILLNESS
[FreeTextEntry1] : Patient is 54 year old male presenting for f/u s/p right heel partial calcanectomy as on 09/10/24. Patient with (+) OM acute and chronic on pathology. Patient is at rehab and has been wheel chair bound.

## 2024-12-01 NOTE — PROGRESS NOTE ADULT - ASSESSMENT
Patient is a 48y old  Male who presents with a chief complaint of perforated stomach ulcer (13 May 2019 13:25) seen by podiatry for left foot medial hallux G2 DFU independent

## 2024-12-01 NOTE — ASSESSMENT
[Foot Care] : foot care [Off-loading] : off-loading [Verbal] : Verbal [Written] : Written [Demo] : Demo [Patient] : Patient [Caregiver] : Caregiver [Good - alert, interested, motivated] : Good - alert, interested, motivated [Needs reinforcement] : needs reinforcement [Dressing changes] : dressing changes [Skin Care] : skin care [Pressure relief] : pressure relief [Signs and symptoms of infection] : sign and symptoms of infection [Nutrition] : nutrition [How and When to Call] : how and when to call [Pain Management] : pain management [Patient responsibility to plan of care] : patient responsibility to plan of care [Glycemic Control] : glycemic control [Stable] : stable [Other: ____] : [unfilled] [Wheelchair] : Wheelchair [Not Applicable - Long Term Care/Home Health Agency] : Long Term Care/Home Health Agency: Not Applicable [] : No [FreeTextEntry2] : Infection Prevention Wound care and Dressing changes Promote and Restore optimal skin integrity Nutrition and Wound Healing  Offloading and Pressure relief Protect and Guard wound site  Maintain acceptable levels of Pain Compliance R/T Elevation of Lower Extremities [FreeTextEntry4] : Pt .double encounter DPM assessed wound site  Patient verbalized understanding of all discussed. Patient to return to WCC in One Week Facility paperwork completed and given to Care Aide, along with WCC Instructions.

## 2024-12-01 NOTE — PHYSICAL EXAM
[4 x 4] : 4 x 4  [Abdominal Pad] : Abdominal Pad [1+] : left 1+ [Ankle Swelling (On Exam)] : present [Ankle Swelling On The Left] : moderate [Varicose Veins Of Lower Extremities] : not present [] : not present [Skin Ulcer] : ulcer [Alert] : alert [Oriented to Person] : oriented to person [Oriented to Place] : oriented to place [Calm] : calm [de-identified] : calm [de-identified] : htn, hld, diabetic cardiovascular disease [de-identified] : s/p left TMA and Achilles tenotomy, pt has been non ambulatory since february  [de-identified] : left foot TMA - closed.  Right posterior heel wound of skin, subcutaneous tissue, fat  and fascia with serous drainage - noted with granulation tissue  [de-identified] : IDDM with neuropathy  [FreeTextEntry2] : 15.0 [FreeTextEntry3] : 4.5 [FreeTextEntry4] : 0.5 [de-identified] : Serosanguinous [de-identified] : 1.5cm from 7 - 8 o'clock  [de-identified] : 15%  [de-identified] : Betadine-soaked gauze [de-identified] :  Mechanically cleansed with 0.9% Normal Saline Kerlix rolled gauze White cotton stockinette [FreeTextEntry8] : 1.4 [FreeTextEntry9] : 1.5 [de-identified] : 0.1  [de-identified] : Serosanguinous [de-identified] : 50% [de-identified] : 50%  [de-identified] : Betadine-soaked gauze [de-identified] : Mechanically cleansed with 0.9% Normal Saline Kerlix rolled gauze White cotton stockinette [de-identified] : Betadine-soaked gauze [de-identified] : Mechanically cleansed with 0.9% Normal Saline Kerlix rolled gauze White cotton stockinette [FreeTextEntry1] : Right medial leg (See Dr. Small note)  [FreeTextEntry7] : Left hip (See Dr. Small note)  [de-identified] : Left buttocks (See Dr. Small note)  [de-identified] : Right buttocks (See Dr. Small note)  [TWNoteComboBox4] : Large [TWNoteComboBox5] : No [TWNoteComboBox6] : Surgical [de-identified] : Yes [de-identified] : Normal [de-identified] : None [de-identified] : None [de-identified] : >75% [de-identified] : Yes [de-identified] : 3x Weekly [de-identified] : Primary Dressing [de-identified] : Small [de-identified] : No [de-identified] : Pressure [de-identified] : No [de-identified] : Macerated [de-identified] : None [de-identified] : Yes [de-identified] : 3x Weekly [de-identified] : Primary Dressing [de-identified] : None [de-identified] : 3x Weekly [de-identified] : Primary Dressing

## 2024-12-01 NOTE — PHYSICAL EXAM
[4 x 4] : 4 x 4  [Abdominal Pad] : Abdominal Pad [1+] : left 1+ [Ankle Swelling (On Exam)] : present [Ankle Swelling On The Left] : moderate [Varicose Veins Of Lower Extremities] : not present [] : not present [Skin Ulcer] : ulcer [Alert] : alert [Oriented to Person] : oriented to person [Oriented to Place] : oriented to place [Calm] : calm [de-identified] : calm [de-identified] : htn, hld, diabetic cardiovascular disease [de-identified] : s/p left TMA and Achilles tenotomy, pt has been non ambulatory since february  [de-identified] : left foot TMA - closed.  Right posterior heel wound of skin, subcutaneous tissue, fat  and fascia with serous drainage - noted with granulation tissue  [de-identified] : IDDM with neuropathy  [FreeTextEntry2] : 15.0 [FreeTextEntry3] : 4.5 [FreeTextEntry4] : 0.5 [de-identified] : Serosanguinous [de-identified] : 1.5cm from 7 - 8 o'clock  [de-identified] : 15%  [de-identified] : Betadine-soaked gauze [de-identified] :  Mechanically cleansed with 0.9% Normal Saline Kerlix rolled gauze White cotton stockinette [FreeTextEntry8] : 1.4 [FreeTextEntry9] : 1.5 [de-identified] : 0.1  [de-identified] : Serosanguinous [de-identified] : 50% [de-identified] : 50%  [de-identified] : Betadine-soaked gauze [de-identified] : Mechanically cleansed with 0.9% Normal Saline Kerlix rolled gauze White cotton stockinette [de-identified] : Betadine-soaked gauze [de-identified] : Mechanically cleansed with 0.9% Normal Saline Kerlix rolled gauze White cotton stockinette [FreeTextEntry1] : Right medial leg (See Dr. Small note)  [FreeTextEntry7] : Left hip (See Dr. Small note)  [de-identified] : Left buttocks (See Dr. Small note)  [de-identified] : Right buttocks (See Dr. Small note)  [TWNoteComboBox4] : Large [TWNoteComboBox5] : No [TWNoteComboBox6] : Surgical [de-identified] : Yes [de-identified] : Normal [de-identified] : None [de-identified] : None [de-identified] : >75% [de-identified] : Yes [de-identified] : 3x Weekly [de-identified] : Primary Dressing [de-identified] : Small [de-identified] : No [de-identified] : Pressure [de-identified] : No [de-identified] : None [de-identified] : Macerated [de-identified] : Yes [de-identified] : 3x Weekly [de-identified] : Primary Dressing [de-identified] : None [de-identified] : Primary Dressing [de-identified] : 3x Weekly

## 2024-12-01 NOTE — REVIEW OF SYSTEMS
[Fever] : no fever [Chills] : no chills [Eye Pain] : no eye pain [Earache] : no earache [Loss Of Hearing] : no hearing loss [Chest Pain] : no chest pain [Shortness Of Breath] : no shortness of breath [Abdominal Pain] : no abdominal pain [Skin Wound] : skin wound [Limb Weakness] : limb weakness [Anxiety] : no anxiety [Easy Bleeding] : a tendency for easy bleeding [FreeTextEntry9] : s/p left foot TMA w/Achilles tenotomy [FreeTextEntry5] : htn, hld diabetic cardio vascular disease  [de-identified] : left foot DFU3, s/p TMA-  healed, right heel wound down to bone , s/p partial calcanectomy with (+) OM on path [de-identified] : IDDM with neuropathy  [de-identified] : diabetic neuropathy

## 2024-12-01 NOTE — REVIEW OF SYSTEMS
[Fever] : no fever [Chills] : no chills [Eye Pain] : no eye pain [Earache] : no earache [Loss Of Hearing] : no hearing loss [Chest Pain] : no chest pain [Shortness Of Breath] : no shortness of breath [Abdominal Pain] : no abdominal pain [Skin Wound] : skin wound [Limb Weakness] : limb weakness [Anxiety] : no anxiety [Easy Bleeding] : a tendency for easy bleeding [FreeTextEntry5] : htn, hld diabetic cardio vascular disease  [FreeTextEntry9] : s/p left foot TMA w/Achilles tenotomy [de-identified] : left foot DFU3, s/p TMA-  healed, right heel wound down to bone , s/p partial calcanectomy with (+) OM on path [de-identified] : IDDM with neuropathy  [de-identified] : diabetic neuropathy

## 2024-12-01 NOTE — PLAN
[FreeTextEntry1] : .Patient seen and evaluated. Discussed etiology and treatment plan. Patient verbalized understanding. C/w local wound care and offloading. Patient is currently in rehab and will return in one week. Patient has been non weight bearing and has been in wheelchair. Patient is high risk for further surgical intervention, sepsis, loss of limb and loss of life.  Spent 20 minutes for patient care and medical decision making.

## 2024-12-04 ENCOUNTER — APPOINTMENT (OUTPATIENT)
Dept: WOUND CARE | Facility: HOSPITAL | Age: 54
End: 2024-12-04

## 2024-12-13 ENCOUNTER — APPOINTMENT (OUTPATIENT)
Dept: WOUND CARE | Facility: HOSPITAL | Age: 54
End: 2024-12-13
Payer: MEDICAID

## 2024-12-13 ENCOUNTER — OUTPATIENT (OUTPATIENT)
Dept: OUTPATIENT SERVICES | Facility: HOSPITAL | Age: 54
LOS: 1 days | Discharge: HOME | End: 2024-12-13
Payer: MEDICAID

## 2024-12-13 ENCOUNTER — APPOINTMENT (OUTPATIENT)
Dept: WOUND CARE | Facility: HOSPITAL | Age: 54
End: 2024-12-13

## 2024-12-13 VITALS
TEMPERATURE: 99 F | HEART RATE: 70 BPM | WEIGHT: 220 LBS | SYSTOLIC BLOOD PRESSURE: 118 MMHG | HEIGHT: 74 IN | OXYGEN SATURATION: 93 % | BODY MASS INDEX: 28.23 KG/M2 | DIASTOLIC BLOOD PRESSURE: 79 MMHG | RESPIRATION RATE: 18 BRPM

## 2024-12-13 DIAGNOSIS — L89.223 PRESSURE ULCER OF LEFT HIP, STAGE 3: ICD-10-CM

## 2024-12-13 DIAGNOSIS — L97.413 NON-PRESSURE CHRONIC ULCER OF RIGHT HEEL AND MIDFOOT WITH NECROSIS OF MUSCLE: ICD-10-CM

## 2024-12-13 DIAGNOSIS — Z98.890 OTHER SPECIFIED POSTPROCEDURAL STATES: Chronic | ICD-10-CM

## 2024-12-13 DIAGNOSIS — L02.611 CUTANEOUS ABSCESS OF RIGHT FOOT: ICD-10-CM

## 2024-12-13 DIAGNOSIS — M86.171 OTHER ACUTE OSTEOMYELITIS, RIGHT ANKLE AND FOOT: ICD-10-CM

## 2024-12-13 DIAGNOSIS — M86.671 OTHER CHRONIC OSTEOMYELITIS, RIGHT ANKLE AND FOOT: ICD-10-CM

## 2024-12-13 DIAGNOSIS — K25.5 CHRONIC OR UNSPECIFIED GASTRIC ULCER WITH PERFORATION: Chronic | ICD-10-CM

## 2024-12-13 DIAGNOSIS — E11.69 TYPE 2 DIABETES MELLITUS WITH OTHER SPECIFIED COMPLICATION: ICD-10-CM

## 2024-12-13 DIAGNOSIS — S98.912A COMPLETE TRAUMATIC AMPUTATION OF LEFT FOOT, LEVEL UNSPECIFIED, INITIAL ENCOUNTER: Chronic | ICD-10-CM

## 2024-12-13 DIAGNOSIS — L89.312 PRESSURE ULCER OF RIGHT BUTTOCK, STAGE 2: ICD-10-CM

## 2024-12-13 PROCEDURE — 99203 OFFICE O/P NEW LOW 30 MIN: CPT

## 2024-12-13 PROCEDURE — G0463: CPT

## 2024-12-13 NOTE — ASSESSMENT
[Verbal] : Verbal [Written] : Written [Demo] : Demo [Patient] : Patient [Caregiver] : Caregiver [Good - alert, interested, motivated] : Good - alert, interested, motivated [Needs reinforcement] : needs reinforcement [Dressing changes] : dressing changes [Skin Care] : skin care [Pressure relief] : pressure relief [Signs and symptoms of infection] : sign and symptoms of infection [Nutrition] : nutrition [How and When to Call] : how and when to call [Pain Management] : pain management [Patient responsibility to plan of care] : patient responsibility to plan of care [Glycemic Control] : glycemic control [Stable] : stable [Other: ____] : [unfilled] [Wheelchair] : Wheelchair [Not Applicable - Long Term Care/Home Health Agency] : Long Term Care/Home Health Agency: Not Applicable [] : No [FreeTextEntry2] : Infection Prevention Wound care and Dressing changes Promote and Restore optimal skin integrity Nutrition and Wound Healing  Offloading and Pressure relief Protect and Guard wound site  Maintain acceptable levels of Pain Compliance R/T Elevation of Lower Extremities [FreeTextEntry4] : PT  DOUBLE ENCOUNTER. MD assessed wound site  Patient verbalized understanding of all discussed. Patient to return to WCC in One week Facility paperwork completed and given to Care Aide, along with WCC Instructions. [FreeTextEntry1] : Pressure ulcers are healing, no acute infection.

## 2024-12-13 NOTE — PHYSICAL EXAM
[Normal Breath Sounds] : Normal breath sounds [Normal Heart Sounds] : normal heart sounds [Alert] : alert [Oriented to Person] : oriented to person [Oriented to Place] : oriented to place [Oriented to Time] : oriented to time [Calm] : calm [Abdominal Pad] : Abdominal Pad [4 x 4] : 4 x 4  [de-identified] : 0.5cm @ 10-12 o 'clock  [de-identified] : Well-developed, Well-nourished in no acute distress. [de-identified] : Within normal limits. [de-identified] : Left hip with very small ul;cer, base is red and viable, no drainage, no odor, no acute infection, periwound skin is intact with no cellulitis. Buttock is only slightly,macerated. Heel ulcer is clean, base is granular, no acute infection. [FreeTextEntry2] : 9.7 [FreeTextEntry3] : 4.9 [FreeTextEntry4] : 0.1 [de-identified] : Betadine soaked gauze  [de-identified] :  Mechanically cleansed with sterile gauze and normal saline 0.9%. Kerlix  [de-identified] : 10% [FreeTextEntry1] : Right medial leg - (CLOSED) [FreeTextEntry7] : Left hip  [FreeTextEntry8] : 0.3 [FreeTextEntry9] : 0.3 [de-identified] : 0.3 [de-identified] : Serous/sanguinous [de-identified] : Silver Alginate, Allevyn  [de-identified] : Mechanically cleansed with 0.9% Normal Saline Cloth tape  [de-identified] : Left buttock [de-identified] : Scattered excoriations  [de-identified] : Excoriation  [de-identified] : Allevyn  [de-identified] : Mechanically cleansed with 0.9% Normal Saline cloth tape [de-identified] : Right buttock [de-identified] : scattered excoriations [de-identified] : 0.1 [de-identified] : Excoriation  [de-identified] : Allevyn  [de-identified] : Mechanically cleansed with 0.9% Normal Saline Cloth tape  [TWNoteComboBox5] : No [de-identified] : Normal [de-identified] : None [de-identified] : No [de-identified] : 3x Weekly [de-identified] : 3x Weekly [TWNoteComboBox4] : None [de-identified] : Small [de-identified] : No [de-identified] : Pressure [de-identified] : No [de-identified] : Normal [de-identified] : None [de-identified] : None [de-identified] : 100% [de-identified] : No [de-identified] : 3x Weekly [de-identified] : Primary Dressing [de-identified] : None [de-identified] : No [de-identified] : Pressure [de-identified] : No [de-identified] : other [de-identified] : None [de-identified] : None [de-identified] : No [de-identified] : 3x Weekly [de-identified] : None [de-identified] : No [de-identified] : Pressure [de-identified] : No [de-identified] : other [de-identified] : None [de-identified] : None [de-identified] : 100% [de-identified] : No [de-identified] : 3x Weekly

## 2024-12-13 NOTE — PHYSICAL EXAM
[Normal Breath Sounds] : Normal breath sounds [Normal Heart Sounds] : normal heart sounds [Alert] : alert [Oriented to Person] : oriented to person [Oriented to Place] : oriented to place [Oriented to Time] : oriented to time [Calm] : calm [Abdominal Pad] : Abdominal Pad [4 x 4] : 4 x 4  [de-identified] : 0.5cm @ 10-12 o 'clock  [de-identified] : Well-developed, Well-nourished in no acute distress. [de-identified] : Within normal limits. [de-identified] : Left hip with very small ul;cer, base is red and viable, no drainage, no odor, no acute infection, periwound skin is intact with no cellulitis. Buttock is only slightly,macerated. Heel ulcer is clean, base is granular, no acute infection. [FreeTextEntry2] : 9.7 [FreeTextEntry3] : 4.9 [FreeTextEntry4] : 0.1 [de-identified] : Betadine soaked gauze  [de-identified] :  Mechanically cleansed with sterile gauze and normal saline 0.9%. Kerlix  [de-identified] : 10% [FreeTextEntry1] : Right medial leg - (CLOSED) [FreeTextEntry7] : Left hip  [FreeTextEntry8] : 0.3 [FreeTextEntry9] : 0.3 [de-identified] : 0.3 [de-identified] : Serous/sanguinous [de-identified] : Silver Alginate, Allevyn  [de-identified] : Mechanically cleansed with 0.9% Normal Saline Cloth tape  [de-identified] : Left buttock [de-identified] : Scattered excoriations  [de-identified] : Excoriation  [de-identified] : Allevyn  [de-identified] : Mechanically cleansed with 0.9% Normal Saline cloth tape [de-identified] : Right buttock [de-identified] : scattered excoriations [de-identified] : 0.1 [de-identified] : Excoriation  [de-identified] : Allevyn  [de-identified] : Mechanically cleansed with 0.9% Normal Saline Cloth tape  [TWNoteComboBox5] : No [de-identified] : Normal [de-identified] : None [de-identified] : No [de-identified] : 3x Weekly [de-identified] : 3x Weekly [TWNoteComboBox4] : None [de-identified] : Small [de-identified] : No [de-identified] : Pressure [de-identified] : No [de-identified] : Normal [de-identified] : None [de-identified] : None [de-identified] : 100% [de-identified] : No [de-identified] : 3x Weekly [de-identified] : Primary Dressing [de-identified] : None [de-identified] : No [de-identified] : Pressure [de-identified] : No [de-identified] : other [de-identified] : None [de-identified] : None [de-identified] : No [de-identified] : 3x Weekly [de-identified] : None [de-identified] : No [de-identified] : Pressure [de-identified] : No [de-identified] : other [de-identified] : None [de-identified] : None [de-identified] : 100% [de-identified] : No [de-identified] : 3x Weekly

## 2024-12-13 NOTE — VITALS
[Pain related to present condition?] : The patient's  pain is not related to present condition. [] : No [de-identified] : 0/10

## 2024-12-13 NOTE — PLAN
[FreeTextEntry1] : Silver Alginate Allevyn Border, return to office in two weeks. 25 minutes spent for patient care and medical decision making.

## 2024-12-13 NOTE — VITALS
[Pain related to present condition?] : The patient's  pain is not related to present condition. [] : No [de-identified] : 0/10

## 2024-12-16 DIAGNOSIS — K59.00 CONSTIPATION, UNSPECIFIED: ICD-10-CM

## 2024-12-16 DIAGNOSIS — L89.223 PRESSURE ULCER OF LEFT HIP, STAGE 3: ICD-10-CM

## 2024-12-16 DIAGNOSIS — L89.322 PRESSURE ULCER OF LEFT BUTTOCK, STAGE 2: ICD-10-CM

## 2024-12-16 DIAGNOSIS — Z86.73 PERSONAL HISTORY OF TRANSIENT ISCHEMIC ATTACK (TIA), AND CEREBRAL INFARCTION WITHOUT RESIDUAL DEFICITS: ICD-10-CM

## 2024-12-16 DIAGNOSIS — D64.9 ANEMIA, UNSPECIFIED: ICD-10-CM

## 2024-12-16 DIAGNOSIS — Z79.899 OTHER LONG TERM (CURRENT) DRUG THERAPY: ICD-10-CM

## 2024-12-16 DIAGNOSIS — I25.2 OLD MYOCARDIAL INFARCTION: ICD-10-CM

## 2024-12-16 DIAGNOSIS — Z87.440 PERSONAL HISTORY OF URINARY (TRACT) INFECTIONS: ICD-10-CM

## 2024-12-16 DIAGNOSIS — E87.6 HYPOKALEMIA: ICD-10-CM

## 2024-12-16 DIAGNOSIS — Z79.01 LONG TERM (CURRENT) USE OF ANTICOAGULANTS: ICD-10-CM

## 2024-12-16 DIAGNOSIS — E56.9 VITAMIN DEFICIENCY, UNSPECIFIED: ICD-10-CM

## 2024-12-16 DIAGNOSIS — E11.59 TYPE 2 DIABETES MELLITUS WITH OTHER CIRCULATORY COMPLICATIONS: ICD-10-CM

## 2024-12-16 DIAGNOSIS — Z79.84 LONG TERM (CURRENT) USE OF ORAL HYPOGLYCEMIC DRUGS: ICD-10-CM

## 2024-12-16 DIAGNOSIS — Z89.432 ACQUIRED ABSENCE OF LEFT FOOT: ICD-10-CM

## 2024-12-16 DIAGNOSIS — Z87.891 PERSONAL HISTORY OF NICOTINE DEPENDENCE: ICD-10-CM

## 2024-12-16 DIAGNOSIS — E11.40 TYPE 2 DIABETES MELLITUS WITH DIABETIC NEUROPATHY, UNSPECIFIED: ICD-10-CM

## 2024-12-27 ENCOUNTER — OUTPATIENT (OUTPATIENT)
Dept: OUTPATIENT SERVICES | Facility: HOSPITAL | Age: 54
LOS: 1 days | Discharge: HOME | End: 2024-12-27
Payer: MEDICAID

## 2024-12-27 ENCOUNTER — APPOINTMENT (OUTPATIENT)
Dept: WOUND CARE | Facility: HOSPITAL | Age: 54
End: 2024-12-27

## 2024-12-27 VITALS
SYSTOLIC BLOOD PRESSURE: 158 MMHG | TEMPERATURE: 99.3 F | WEIGHT: 220 LBS | DIASTOLIC BLOOD PRESSURE: 72 MMHG | HEART RATE: 77 BPM | BODY MASS INDEX: 28.23 KG/M2 | RESPIRATION RATE: 18 BRPM | OXYGEN SATURATION: 96 % | HEIGHT: 74 IN

## 2024-12-27 DIAGNOSIS — E11.69 TYPE 2 DIABETES MELLITUS WITH OTHER SPECIFIED COMPLICATION: ICD-10-CM

## 2024-12-27 DIAGNOSIS — Z98.890 OTHER SPECIFIED POSTPROCEDURAL STATES: Chronic | ICD-10-CM

## 2024-12-27 DIAGNOSIS — K25.5 CHRONIC OR UNSPECIFIED GASTRIC ULCER WITH PERFORATION: Chronic | ICD-10-CM

## 2024-12-27 DIAGNOSIS — L89.223 PRESSURE ULCER OF LEFT HIP, STAGE 3: ICD-10-CM

## 2024-12-27 DIAGNOSIS — S98.912A COMPLETE TRAUMATIC AMPUTATION OF LEFT FOOT, LEVEL UNSPECIFIED, INITIAL ENCOUNTER: Chronic | ICD-10-CM

## 2024-12-27 DIAGNOSIS — M86.671 OTHER CHRONIC OSTEOMYELITIS, RIGHT ANKLE AND FOOT: ICD-10-CM

## 2024-12-27 DIAGNOSIS — L89.322 PRESSURE ULCER OF LEFT BUTTOCK, STAGE 2: ICD-10-CM

## 2024-12-27 DIAGNOSIS — L97.413 NON-PRESSURE CHRONIC ULCER OF RIGHT HEEL AND MIDFOOT WITH NECROSIS OF MUSCLE: ICD-10-CM

## 2024-12-27 DIAGNOSIS — M86.171 OTHER ACUTE OSTEOMYELITIS, RIGHT ANKLE AND FOOT: ICD-10-CM

## 2024-12-27 DIAGNOSIS — L89.312 PRESSURE ULCER OF RIGHT BUTTOCK, STAGE 2: ICD-10-CM

## 2024-12-27 PROCEDURE — G0463: CPT

## 2024-12-27 PROCEDURE — 99204 OFFICE O/P NEW MOD 45 MIN: CPT

## 2024-12-30 NOTE — PHYSICAL EXAM
[Abdominal Pad] : Abdominal Pad [4 x 4] : 4 x 4  [Normal Breath Sounds] : Normal breath sounds [Normal Heart Sounds] : normal heart sounds [Alert] : alert [Oriented to Person] : oriented to person [Oriented to Place] : oriented to place [Oriented to Time] : oriented to time [Calm] : calm [FreeTextEntry2] : 9.7 [FreeTextEntry3] : 4.9 [FreeTextEntry4] : 0.1 [de-identified] : 10% [FreeTextEntry1] : Right medial leg - closed [de-identified] : No product [de-identified] : Mechanically cleansed with 0.9% normal saline and sterile 4x4 gauze [FreeTextEntry7] : Left Hip [FreeTextEntry8] : 0.6 [FreeTextEntry9] : 0.4 [de-identified] : hyperpigmentation [de-identified] : Allevyn AG [de-identified] : Mechanically cleansed with 0.9% Normal Saline Cloth tape  [de-identified] : Allevyn  [de-identified] : Mechanically cleansed with 0.9% Normal Saline cloth tape [de-identified] : Right buttock- closed [de-identified] : Allevyn  [de-identified] : Mechanically cleansed with 0.9% Normal Saline Cloth tape  [de-identified] : Pressure [de-identified] : None [de-identified] : None [de-identified] : False [de-identified] : 3x Weekly [de-identified] : Primary Dressing [de-identified] : No [de-identified] : 3x Weekly [de-identified] : No [de-identified] : False [de-identified] : 3x Weekly [de-identified] : Well-developed, Well-nourished in no acute distress. [de-identified] : Within normal limits. [de-identified] : Within normal limits. [de-identified] : Within normal limits. [de-identified] : Left hip with very small ul;cer, base is red and viable, no drainage, no odor, no acute infection, periwound skin is intact with no cellulitis. Buttock is only slightly,macerated. Heel ulcer is clean, base is granular, no acute infection. [TWNoteComboBox4] : Moderate [de-identified] : No [de-identified] : None [de-identified] : 100% [de-identified] : No [de-identified] : 3x Weekly [de-identified] : Primary Dressing [de-identified] : No [de-identified] : Small [de-identified] : Normal [de-identified] : None [de-identified] : >75% [de-identified] : Yes [de-identified] : 3x Weekly [de-identified] : Primary Dressing [de-identified] : None [de-identified] : No [de-identified] : Pressure [de-identified] : No [de-identified] : Normal [de-identified] : None [de-identified] : None [TWNoteComboBox5] : No [TWNoteComboBox6] : Traumatic [de-identified] : No [de-identified] : Normal [de-identified] : None [de-identified] : None [de-identified] : No [de-identified] : Other [de-identified] : Other

## 2024-12-30 NOTE — VITALS
[Dull] : dull [Burning] : burning [Aching] : aching [Stabbing] : stabbing [Continuous] : continuous [Pain related to present condition?] : The patient's  pain is not related to present condition. [] : No [de-identified] : 6/10 [FreeTextEntry3] : Right foot [FreeTextEntry1] : Pain medication [FreeTextEntry2] : Pressure

## 2024-12-30 NOTE — PLAN
[FreeTextEntry1] : left hip ulcer and bilateral buttock ulcers Allevyn Border bilateral buttock 3X/week/PRN Allevyn left hip ulcer 3X/week return to office in two weeks.  right heel and anterior ankle  betadine soak as per podiatry QOD,DD 35 minutes spent for patient care and medical decision making.

## 2024-12-30 NOTE — VITALS
[Dull] : dull [Burning] : burning [Aching] : aching [Stabbing] : stabbing [Continuous] : continuous [Pain related to present condition?] : The patient's  pain is not related to present condition. [] : No [de-identified] : 6/10 [FreeTextEntry3] : Right foot [FreeTextEntry1] : Pain medication [FreeTextEntry2] : Pressure

## 2024-12-30 NOTE — HISTORY OF PRESENT ILLNESS
[FreeTextEntry1] : left hip pressure ulcer is small, healing buttock ulcer is closed, no C/O 12/27/24 left hip appears closed. Mild excoriation right buttock. No SOI Right heel healthy granulation tissue. Anterior ankle dry and closing No SOI

## 2024-12-30 NOTE — VITALS
[Dull] : dull [Burning] : burning [Aching] : aching [Stabbing] : stabbing [Continuous] : continuous [Pain related to present condition?] : The patient's  pain is not related to present condition. [] : No [de-identified] : 6/10 [FreeTextEntry3] : Right foot [FreeTextEntry1] : Pain medication [FreeTextEntry2] : Pressure

## 2024-12-30 NOTE — ASSESSMENT
[Verbal] : Verbal [Written] : Written [Demo] : Demo [Patient] : Patient [Caregiver] : Caregiver [Good - alert, interested, motivated] : Good - alert, interested, motivated [Dressing changes] : dressing changes [Skin Care] : skin care [Pressure relief] : pressure relief [Signs and symptoms of infection] : sign and symptoms of infection [Nutrition] : nutrition [How and When to Call] : how and when to call [Pain Management] : pain management [Patient responsibility to plan of care] : patient responsibility to plan of care [Glycemic Control] : glycemic control [] : Yes [Stable] : stable [Other: ____] : [unfilled] [Wheelchair] : Wheelchair [Not Applicable - Long Term Care/Home Health Agency] : Long Term Care/Home Health Agency: Not Applicable [FreeTextEntry4] : Pt has an offloading mattress/bed in the home for pressure relief Pt reports use of heel offloading booties to reduce pressure. Facility paperwork completed and given to Care Aide, along with WCC Instructions. F/U 1 week [FreeTextEntry1] : Facility staff provide dressing changes- wc instructions attached to facility consult form [FreeTextEntry2] : Infection Prevention S/S of infection Wound care and Dressing changes Promote and Restore optimal skin integrity Nutrition and Wound Healing  Offloading and Pressure relief Protect and Guard wound site  Maintain acceptable levels of Pain Compliance R/T Elevation of Lower Extremities

## 2024-12-30 NOTE — VITALS
[Dull] : dull [Burning] : burning [Aching] : aching [Stabbing] : stabbing [Continuous] : continuous [Pain related to present condition?] : The patient's  pain is not related to present condition. [] : No [de-identified] : 6/10 [FreeTextEntry3] : Right foot [FreeTextEntry1] : Pain medication [FreeTextEntry2] : Pressure

## 2024-12-30 NOTE — PHYSICAL EXAM
[Abdominal Pad] : Abdominal Pad [4 x 4] : 4 x 4  [Normal Breath Sounds] : Normal breath sounds [Normal Heart Sounds] : normal heart sounds [Alert] : alert [Oriented to Person] : oriented to person [Oriented to Place] : oriented to place [Oriented to Time] : oriented to time [Calm] : calm [FreeTextEntry2] : 9.7 [FreeTextEntry3] : 4.9 [FreeTextEntry4] : 0.1 [de-identified] : 10% [FreeTextEntry1] : Right medial leg - closed [de-identified] : No product [de-identified] : Mechanically cleansed with 0.9% normal saline and sterile 4x4 gauze [FreeTextEntry7] : Left Hip [FreeTextEntry8] : 0.6 [FreeTextEntry9] : 0.4 [de-identified] : hyperpigmentation [de-identified] : Allevyn AG [de-identified] : Mechanically cleansed with 0.9% Normal Saline Cloth tape  [de-identified] : Allevyn  [de-identified] : Mechanically cleansed with 0.9% Normal Saline cloth tape [de-identified] : Right buttock- closed [de-identified] : Allevyn  [de-identified] : Mechanically cleansed with 0.9% Normal Saline Cloth tape  [de-identified] : Pressure [de-identified] : None [de-identified] : None [de-identified] : False [de-identified] : 3x Weekly [de-identified] : Primary Dressing [de-identified] : No [de-identified] : 3x Weekly [de-identified] : No [de-identified] : False [de-identified] : 3x Weekly [de-identified] : Well-developed, Well-nourished in no acute distress. [de-identified] : Within normal limits. [de-identified] : Within normal limits. [de-identified] : Within normal limits. [de-identified] : Left hip with very small ul;cer, base is red and viable, no drainage, no odor, no acute infection, periwound skin is intact with no cellulitis. Buttock is only slightly,macerated. Heel ulcer is clean, base is granular, no acute infection. [TWNoteComboBox4] : Moderate [de-identified] : No [de-identified] : None [de-identified] : 100% [de-identified] : No [de-identified] : 3x Weekly [de-identified] : Primary Dressing [de-identified] : Small [de-identified] : No [de-identified] : Normal [de-identified] : None [de-identified] : >75% [de-identified] : Yes [de-identified] : 3x Weekly [de-identified] : Primary Dressing [de-identified] : None [de-identified] : No [de-identified] : Pressure [de-identified] : No [de-identified] : Normal [de-identified] : None [de-identified] : None [TWNoteComboBox5] : No [TWNoteComboBox6] : Traumatic [de-identified] : No [de-identified] : Normal [de-identified] : None [de-identified] : None [de-identified] : No [de-identified] : Other [de-identified] : Other

## 2024-12-30 NOTE — PHYSICAL EXAM
[Abdominal Pad] : Abdominal Pad [4 x 4] : 4 x 4  [Normal Breath Sounds] : Normal breath sounds [Normal Heart Sounds] : normal heart sounds [Alert] : alert [Oriented to Person] : oriented to person [Oriented to Place] : oriented to place [Oriented to Time] : oriented to time [Calm] : calm [FreeTextEntry2] : 9.7 [FreeTextEntry3] : 4.9 [FreeTextEntry4] : 0.1 [de-identified] : 10% [FreeTextEntry1] : Right medial leg - closed [de-identified] : No product [de-identified] : Mechanically cleansed with 0.9% normal saline and sterile 4x4 gauze [FreeTextEntry7] : Left Hip [FreeTextEntry8] : 0.6 [FreeTextEntry9] : 0.4 [de-identified] : hyperpigmentation [de-identified] : Allevyn AG [de-identified] : Mechanically cleansed with 0.9% Normal Saline Cloth tape  [de-identified] : Allevyn  [de-identified] : Mechanically cleansed with 0.9% Normal Saline cloth tape [de-identified] : Right buttock- closed [de-identified] : Allevyn  [de-identified] : Mechanically cleansed with 0.9% Normal Saline Cloth tape  [de-identified] : Pressure [de-identified] : None [de-identified] : None [de-identified] : False [de-identified] : 3x Weekly [de-identified] : Primary Dressing [de-identified] : No [de-identified] : 3x Weekly [de-identified] : No [de-identified] : False [de-identified] : 3x Weekly [de-identified] : Well-developed, Well-nourished in no acute distress. [de-identified] : Within normal limits. [de-identified] : Within normal limits. [de-identified] : Within normal limits. [de-identified] : Left hip with very small ul;cer, base is red and viable, no drainage, no odor, no acute infection, periwound skin is intact with no cellulitis. Buttock is only slightly,macerated. Heel ulcer is clean, base is granular, no acute infection. [TWNoteComboBox4] : Moderate [de-identified] : No [de-identified] : None [de-identified] : 100% [de-identified] : No [de-identified] : 3x Weekly [de-identified] : Primary Dressing [de-identified] : Small [de-identified] : No [de-identified] : Normal [de-identified] : None [de-identified] : >75% [de-identified] : Yes [de-identified] : 3x Weekly [de-identified] : Primary Dressing [de-identified] : None [de-identified] : No [de-identified] : Pressure [de-identified] : No [de-identified] : Normal [de-identified] : None [de-identified] : None [TWNoteComboBox5] : No [TWNoteComboBox6] : Traumatic [de-identified] : No [de-identified] : Normal [de-identified] : None [de-identified] : None [de-identified] : No [de-identified] : Other [de-identified] : Other

## 2024-12-30 NOTE — PHYSICAL EXAM
[Abdominal Pad] : Abdominal Pad [4 x 4] : 4 x 4  [Normal Breath Sounds] : Normal breath sounds [Normal Heart Sounds] : normal heart sounds [Alert] : alert [Oriented to Person] : oriented to person [Oriented to Place] : oriented to place [Oriented to Time] : oriented to time [Calm] : calm [FreeTextEntry2] : 9.7 [FreeTextEntry3] : 4.9 [FreeTextEntry4] : 0.1 [de-identified] : 10% [FreeTextEntry1] : Right medial leg - closed [de-identified] : No product [de-identified] : Mechanically cleansed with 0.9% normal saline and sterile 4x4 gauze [FreeTextEntry7] : Left Hip [FreeTextEntry8] : 0.6 [FreeTextEntry9] : 0.4 [de-identified] : hyperpigmentation [de-identified] : Allevyn AG [de-identified] : Mechanically cleansed with 0.9% Normal Saline Cloth tape  [de-identified] : Allevyn  [de-identified] : Mechanically cleansed with 0.9% Normal Saline cloth tape [de-identified] : Right buttock- closed [de-identified] : Allevyn  [de-identified] : Mechanically cleansed with 0.9% Normal Saline Cloth tape  [de-identified] : Pressure [de-identified] : None [de-identified] : None [de-identified] : False [de-identified] : 3x Weekly [de-identified] : Primary Dressing [de-identified] : No [de-identified] : 3x Weekly [de-identified] : No [de-identified] : False [de-identified] : 3x Weekly [de-identified] : Well-developed, Well-nourished in no acute distress. [de-identified] : Within normal limits. [de-identified] : Within normal limits. [de-identified] : Within normal limits. [de-identified] : Left hip with very small ul;cer, base is red and viable, no drainage, no odor, no acute infection, periwound skin is intact with no cellulitis. Buttock is only slightly,macerated. Heel ulcer is clean, base is granular, no acute infection. [TWNoteComboBox4] : Moderate [de-identified] : No [de-identified] : None [de-identified] : 100% [de-identified] : No [de-identified] : 3x Weekly [de-identified] : Primary Dressing [de-identified] : Small [de-identified] : No [de-identified] : Normal [de-identified] : None [de-identified] : >75% [de-identified] : Yes [de-identified] : 3x Weekly [de-identified] : Primary Dressing [de-identified] : None [de-identified] : No [de-identified] : Pressure [de-identified] : No [de-identified] : Normal [de-identified] : None [de-identified] : None [TWNoteComboBox5] : No [TWNoteComboBox6] : Traumatic [de-identified] : No [de-identified] : Normal [de-identified] : None [de-identified] : None [de-identified] : No [de-identified] : Other [de-identified] : Other

## 2024-12-30 NOTE — PHYSICAL EXAM
[Abdominal Pad] : Abdominal Pad [4 x 4] : 4 x 4  [Normal Breath Sounds] : Normal breath sounds [Normal Heart Sounds] : normal heart sounds [Alert] : alert [Oriented to Person] : oriented to person [Oriented to Place] : oriented to place [Oriented to Time] : oriented to time [Calm] : calm [FreeTextEntry2] : 9.7 [FreeTextEntry3] : 4.9 [FreeTextEntry4] : 0.1 [de-identified] : 10% [FreeTextEntry1] : Right medial leg - closed [de-identified] : No product [de-identified] : Mechanically cleansed with 0.9% normal saline and sterile 4x4 gauze [FreeTextEntry7] : Left Hip [FreeTextEntry8] : 0.6 [FreeTextEntry9] : 0.4 [de-identified] : hyperpigmentation [de-identified] : Allevyn AG [de-identified] : Mechanically cleansed with 0.9% Normal Saline Cloth tape  [de-identified] : Allevyn  [de-identified] : Mechanically cleansed with 0.9% Normal Saline cloth tape [de-identified] : Right buttock- closed [de-identified] : Allevyn  [de-identified] : Mechanically cleansed with 0.9% Normal Saline Cloth tape  [de-identified] : Pressure [de-identified] : None [de-identified] : None [de-identified] : False [de-identified] : 3x Weekly [de-identified] : Primary Dressing [de-identified] : No [de-identified] : 3x Weekly [de-identified] : No [de-identified] : False [de-identified] : 3x Weekly [de-identified] : Well-developed, Well-nourished in no acute distress. [de-identified] : Within normal limits. [de-identified] : Within normal limits. [de-identified] : Within normal limits. [de-identified] : Left hip with very small ul;cer, base is red and viable, no drainage, no odor, no acute infection, periwound skin is intact with no cellulitis. Buttock is only slightly,macerated. Heel ulcer is clean, base is granular, no acute infection. [TWNoteComboBox4] : Moderate [de-identified] : No [de-identified] : None [de-identified] : 100% [de-identified] : No [de-identified] : 3x Weekly [de-identified] : Primary Dressing [de-identified] : Small [de-identified] : No [de-identified] : Normal [de-identified] : None [de-identified] : >75% [de-identified] : Yes [de-identified] : 3x Weekly [de-identified] : Primary Dressing [de-identified] : None [de-identified] : No [de-identified] : Pressure [de-identified] : No [de-identified] : Normal [de-identified] : None [de-identified] : None [TWNoteComboBox5] : No [TWNoteComboBox6] : Traumatic [de-identified] : No [de-identified] : Normal [de-identified] : None [de-identified] : None [de-identified] : No [de-identified] : Other [de-identified] : Other

## 2024-12-30 NOTE — VITALS
[Dull] : dull [Burning] : burning [Aching] : aching [Stabbing] : stabbing [Continuous] : continuous [Pain related to present condition?] : The patient's  pain is not related to present condition. [] : No [de-identified] : 6/10 [FreeTextEntry3] : Right foot [FreeTextEntry1] : Pain medication [FreeTextEntry2] : Pressure

## 2024-12-31 DIAGNOSIS — D64.9 ANEMIA, UNSPECIFIED: ICD-10-CM

## 2024-12-31 DIAGNOSIS — Z99.3 DEPENDENCE ON WHEELCHAIR: ICD-10-CM

## 2024-12-31 DIAGNOSIS — E11.40 TYPE 2 DIABETES MELLITUS WITH DIABETIC NEUROPATHY, UNSPECIFIED: ICD-10-CM

## 2024-12-31 DIAGNOSIS — Z89.432 ACQUIRED ABSENCE OF LEFT FOOT: ICD-10-CM

## 2024-12-31 DIAGNOSIS — L89.312 PRESSURE ULCER OF RIGHT BUTTOCK, STAGE 2: ICD-10-CM

## 2024-12-31 DIAGNOSIS — K59.00 CONSTIPATION, UNSPECIFIED: ICD-10-CM

## 2024-12-31 DIAGNOSIS — Z87.440 PERSONAL HISTORY OF URINARY (TRACT) INFECTIONS: ICD-10-CM

## 2024-12-31 DIAGNOSIS — Z56.9 UNSPECIFIED PROBLEMS RELATED TO EMPLOYMENT: ICD-10-CM

## 2024-12-31 DIAGNOSIS — Z79.84 LONG TERM (CURRENT) USE OF ORAL HYPOGLYCEMIC DRUGS: ICD-10-CM

## 2024-12-31 DIAGNOSIS — E11.59 TYPE 2 DIABETES MELLITUS WITH OTHER CIRCULATORY COMPLICATIONS: ICD-10-CM

## 2024-12-31 DIAGNOSIS — E87.6 HYPOKALEMIA: ICD-10-CM

## 2024-12-31 DIAGNOSIS — L89.322 PRESSURE ULCER OF LEFT BUTTOCK, STAGE 2: ICD-10-CM

## 2024-12-31 DIAGNOSIS — I25.2 OLD MYOCARDIAL INFARCTION: ICD-10-CM

## 2024-12-31 DIAGNOSIS — Z79.899 OTHER LONG TERM (CURRENT) DRUG THERAPY: ICD-10-CM

## 2024-12-31 DIAGNOSIS — Z79.01 LONG TERM (CURRENT) USE OF ANTICOAGULANTS: ICD-10-CM

## 2024-12-31 DIAGNOSIS — Z91.013 ALLERGY TO SEAFOOD: ICD-10-CM

## 2024-12-31 DIAGNOSIS — Z86.73 PERSONAL HISTORY OF TRANSIENT ISCHEMIC ATTACK (TIA), AND CEREBRAL INFARCTION WITHOUT RESIDUAL DEFICITS: ICD-10-CM

## 2024-12-31 DIAGNOSIS — Z87.891 PERSONAL HISTORY OF NICOTINE DEPENDENCE: ICD-10-CM

## 2024-12-31 DIAGNOSIS — L89.223 PRESSURE ULCER OF LEFT HIP, STAGE 3: ICD-10-CM

## 2024-12-31 SDOH — ECONOMIC STABILITY - INCOME SECURITY: UNSPECIFIED PROBLEMS RELATED TO EMPLOYMENT: Z56.9

## 2025-01-06 ENCOUNTER — EMERGENCY (EMERGENCY)
Facility: HOSPITAL | Age: 55
LOS: 1 days | Discharge: HOME | End: 2025-01-06
Attending: STUDENT IN AN ORGANIZED HEALTH CARE EDUCATION/TRAINING PROGRAM | Admitting: STUDENT IN AN ORGANIZED HEALTH CARE EDUCATION/TRAINING PROGRAM
Payer: MEDICAID

## 2025-01-06 VITALS
TEMPERATURE: 98 F | OXYGEN SATURATION: 94 % | RESPIRATION RATE: 16 BRPM | HEART RATE: 76 BPM | SYSTOLIC BLOOD PRESSURE: 129 MMHG | DIASTOLIC BLOOD PRESSURE: 89 MMHG

## 2025-01-06 VITALS
DIASTOLIC BLOOD PRESSURE: 97 MMHG | SYSTOLIC BLOOD PRESSURE: 158 MMHG | RESPIRATION RATE: 16 BRPM | OXYGEN SATURATION: 95 % | WEIGHT: 210.1 LBS | TEMPERATURE: 98 F | HEART RATE: 78 BPM

## 2025-01-06 DIAGNOSIS — K25.5 CHRONIC OR UNSPECIFIED GASTRIC ULCER WITH PERFORATION: Chronic | ICD-10-CM

## 2025-01-06 DIAGNOSIS — Z98.890 OTHER SPECIFIED POSTPROCEDURAL STATES: Chronic | ICD-10-CM

## 2025-01-06 DIAGNOSIS — S98.912A COMPLETE TRAUMATIC AMPUTATION OF LEFT FOOT, LEVEL UNSPECIFIED, INITIAL ENCOUNTER: Chronic | ICD-10-CM

## 2025-01-06 PROCEDURE — 99284 EMERGENCY DEPT VISIT MOD MDM: CPT | Mod: 25

## 2025-01-06 PROCEDURE — 51702 INSERT TEMP BLADDER CATH: CPT

## 2025-01-06 PROCEDURE — 96374 THER/PROPH/DIAG INJ IV PUSH: CPT | Mod: XU

## 2025-01-06 RX ORDER — OXYCODONE AND ACETAMINOPHEN 5; 325 MG/1; MG/1
2 TABLET ORAL ONCE
Refills: 0 | Status: DISCONTINUED | OUTPATIENT
Start: 2025-01-06 | End: 2025-01-06

## 2025-01-06 RX ORDER — FENTANYL 75 UG/H
100 PATCH, EXTENDED RELEASE TRANSDERMAL ONCE
Refills: 0 | Status: DISCONTINUED | OUTPATIENT
Start: 2025-01-06 | End: 2025-01-06

## 2025-01-06 RX ADMIN — FENTANYL 100 MICROGRAM(S): 75 PATCH, EXTENDED RELEASE TRANSDERMAL at 19:39

## 2025-01-06 RX ADMIN — FENTANYL 100 MICROGRAM(S): 75 PATCH, EXTENDED RELEASE TRANSDERMAL at 21:11

## 2025-01-06 NOTE — ED PROCEDURE NOTE - PROCEDURE ADDITIONAL DETAILS
jacques catheter unable to be removed. Parenteral pain medications given and jacques removed with gentle pressure.
standard 16 Aguilera placed with small amount of resistance. + urine return.

## 2025-01-06 NOTE — ED ADULT TRIAGE NOTE - WEIGHT IN KG
Patient had a negative COVID screening prior to PFT.  Performed PFT. See scanned results for additional information.     95.3

## 2025-01-06 NOTE — ED PROVIDER NOTE - NSFOLLOWUPINSTRUCTIONS_ED_ALL_ED_FT
Follow up with urology  return to er for any worsening symptoms     Aguilera Catheter Placement and Care    WHAT YOU NEED TO KNOW:    What is a Aguilera catheter? A Aguilera catheter is a thin, flexible, sterile tube that drains urine from your bladder. It is also called an indwelling urinary catheter. The tip of the catheter has a small balloon filled with solution that holds the catheter in your bladder.        How is a Aguilera catheter placed? Your healthcare provider will clean your genital area to prevent infection. Your provider will insert the catheter into your urethra. When urine begins to flow into the tubing, the balloon is filled to keep the catheter in place. The open end of the catheter is attached to a drainage bag. Urine drains from your bladder, through the tube into the drainage bag.    Why is catheter care important? An infection can develop when bacteria get inside the catheter or drainage system. This can happen when the urine bag is changed or when a urine sample is collected. You can get an infection if you do not wash your hands. You can also get an infection if the catheter equipment is not cleaned properly. Urinary catheter-based infections can lead to serious illness. The following can help prevent infection:    Care for your catheter as directed. Follow directions on how to clean and care for your catheter, insertion site, and drainage bag.    Wash your hands often. Always wash with soap and water before and after you touch your catheter, tubing, or drainage bag. Wear clean medical gloves when you care for your catheter or disconnect the drainage bag. This will help stop germs from getting into your catheter. Remind anyone who cares for your catheter or drainage system to wash his or her hands.  Handwashing      Clean your genital area. Wash the catheter area 2 times every day. Wash your anal opening after every bowel movement. Use a soapy cloth to clean the area:  If you are male, clean the tip of your penis. Start where the catheter enters. Wipe backward, making sure to pull back the foreskin. Then use a cloth with clear water in the same direction to clean away the soap.    If you are female, clean the area where the catheter enters your body. Separate your labia (the smaller folds of your skin around your vaginal opening) and wipe toward the anus. Then use a cloth with clear water and wipe in the same direction.    Secure the catheter tube. Healthcare providers will show you how to use medical tape or a strap to secure the catheter tube to your leg. Do not pull or move the catheter. This helps prevent pain and bladder spasms.    Drink liquids as directed. This will help keep your urine clear and prevent catheter blockage and infection. Good choices for most people include water, juice, and milk. Ask how much liquid to drink each day and which liquids are best for you.  How do I care for my drainage bag? You will use a leg bag during the day. You may need a larger drainage bag at night.    Keep a closed drainage system. Your catheter should always be attached to the drainage bag to form a closed system. Do not disconnect any part of the closed system unless you need to change the bag.    Position the drainage bag properly. Keep the drainage bag below the level of your waist. This helps stop urine from moving back up the tubing and into your bladder. Do not loop or kink the tubing. This can cause urine to back up and collect in your bladder. Do not let the drainage bag touch or lie on the floor. Do not lie down or sleep with your drainage bag attached to your leg.    Empty the drainage bag when needed. The weight of a full drainage bag can pull on the catheter and cause pain. Empty the drainage bag every 3 to 6 hours or when it is ? full.    Clean and change the drainage bag as directed. Ask your healthcare provider how often you should change the drainage bag and which cleaning solution to use. Wear disposable gloves when you change the bag. Do not allow the end of the catheter or tubing to touch anything. Clean the ends with an alcohol pad before you reconnect them.  What can I do if problems develop?    No urine is draining into the bag:  Check for kinks in the tubing and straighten them out.    Check the tape or strap used to secure the catheter tube to your skin. Make sure it is not blocking the tube.    Make sure you are not sitting or lying on the tubing.    Make sure the urine bag is hanging below the level of your waist.    Urine leaks from or around the catheter, tubing, or drainage bag: Check if the closed drainage system has accidently come open or apart. Clean the catheter and tubing ends with a new alcohol pad and reconnect them.  When should I seek immediate care?    Your catheter comes out.    You suddenly have material that looks like sand in the tubing or drainage bag.    You see blood in the tubing or drainage bag.    Little or no urine is draining into the bag, and you have checked the system.    You have pain in your hip, back, pelvis, or lower abdomen.    You are confused or cannot think clearly.    You see swelling, redness, pus, or burning where the catheter goes into your body.  When should I call my doctor?    You have a fever or shaking chills.    You have bladder spasms for more than 1 day after the catheter is placed.    Your urine has a strong smell.    You have a rash or itching where the catheter tube is secured to your skin.    Urine leaks from or around the catheter, tubing, or drainage bag.    The closed drainage system has accidentally come open or apart.    You see a layer of crystals inside the tubing.    You have questions or concerns about your condition or care.  CARE AGREEMENT:    You have the right to help plan your care. Learn about your health condition and how it may be treated. Discuss treatment options with your healthcare providers to decide what care you want to receive. You always have the right to refuse treatment.

## 2025-01-06 NOTE — ED ADULT NURSE NOTE - LOCATION
no loss of consciousness, no gait abnormality, + headache, no sensory deficits, and no weakness.
lowetr abdominal pain

## 2025-01-06 NOTE — ED PROVIDER NOTE - PATIENT PORTAL LINK FT
You can access the FollowMyHealth Patient Portal offered by [facility] by registering at the following website: [email or web address]. By joining [employer]’s FollowMyHealth portal, you will also be able to view your health information using other applications (apps) compatible with our system.

## 2025-01-06 NOTE — ED PROVIDER NOTE - OBJECTIVE STATEMENT
54-year-old male with past medical hx of A-fib on Eliquis s/p cardiac arrest x 2 with acute heart failure indwelling Aguilera cerebral aneurysm CAD with stents CVA diabetes hypertension osteomyelitis PE perforated gastric ulcer brought in by EMS from [facility] for Aguilera removal.  Patient states he was due for his usual Aguilera removal and nurse at unable to remove it so sent to ED.  Patient denies any other complaints

## 2025-01-06 NOTE — ED ADULT TRIAGE NOTE - CHIEF COMPLAINT QUOTE
patient came in with jacques- unable to remove jacques at the nursing home- needs jacques catheter changed

## 2025-01-06 NOTE — ED ADULT TRIAGE NOTE - NS ED NURSE BANDS TYPE
[facility]  Financial Navigator Encounter    4/26/2021- Faxed PA request for pomalyst to [facility] for processing. 4/28/2021- Called [facility] to check status of PA request and this was approved, patient's co-pay is $0 and they will contact patient now to schedule. Name band;

## 2025-01-06 NOTE — ED ADULT NURSE REASSESSMENT NOTE - HEART RATE (BEATS/MIN)
Requested Prescriptions     Pending Prescriptions Disp Refills    buPROPion SR (WELLBUTRIN SR) 100 mg SR tablet 180 Tablet 0     Sig: TAKE 1 TABLET TWICE DAILY
76

## 2025-01-06 NOTE — ED ADULT NURSE NOTE - OBJECTIVE STATEMENT
patient brought into the ER- has an 16# jacques catheter that nursing home was unble to remove- 16#  catheter- as per nursing home-they had deflated the balloon, but still was unable to remove it- RN tried to remove catheter by trying to deflate balloon- unseccessful- MELISSA Morocho made aware.

## 2025-01-06 NOTE — ED ADULT NURSE REASSESSMENT NOTE - NS ED NURSE REASSESS COMMENT FT1
patient bladder scanned>261ml . Dr Steen made aware.
pt reavaluated new jacques catheter placed by MD jewell and good  clear urine output noted  vital signs stable and pt dc  back awaiting EMS no complaints voiced

## 2025-01-06 NOTE — ED ADULT NURSE NOTE - NSFALLHARMRISKINTERV_ED_ALL_ED

## 2025-01-06 NOTE — ED ADULT TRIAGE NOTE - NS ED NURSE DIRECT TO ROOM YN
FOCUS/GOAL  Bowel management, Bladder management, Nutrition/Feeding/Swallowing precautions, Pain management, Mobility, Skin integrity, and Cognition/Memory/Judgment/Problem solving    ASSESSMENT, INTERVENTIONS AND CONTINUING PLAN FOR GOAL:    Pt A&Ox4. Complained of pain to neck and received tylenol, pain was relieved. Full liquid diet during shift, NPO at midnight for procedure tomorrow AM. Receiving continuous Protonix through L PICC. Right PIV added for procedure tomorrow. Patient denies numbness, tingling, SOB, chest pain, headache, and nausea. Ax1 with walker to bedside commode during shift. Md Watkins during shift, no void. Patient cathed for 1100 and 600 during shift. Blanchable redness noted to coccyx. VSS. Continue POC.      No

## 2025-01-06 NOTE — ED PROVIDER NOTE - IN ACCORDANCE WITH [place] STATE LAW, WE OFFER EVERY PATIENT A HEPATITIS C TEST. WOULD YOU LIKE TO BE TESTED TODAY?
----- Message from April M Wade Board sent at 10/12/2020 11:01 AM EDT -----  Regarding: Dr. Se Livingston for call: Requested a call back   Callback required yes/no and why: Yes   Best contact number(s): [phone]  Details to clarify the request: Pt Stated she would like Dr. Epifanio Pizano to send lab request to [facility] so that she could have be completed before her appointment. She would like to speak with Nurse Muniz regarding this matter.
Notified patient PCP prefers to do the office visit then order the necessary labs. Reassured she does not need need to fast for her 1:30 pm appt as PCP can provide lab orders to do another day fasting after the visit. Patient finds this method pointless as she prefers to talk with the MD about her labs vs after the visit. Advised this is PCP's preference & her feelings can be addressed at her upcoming appt. Patient voiced understanding & thanked me for the return call.
Opt out

## 2025-01-06 NOTE — ED PROVIDER NOTE - PROGRESS NOTE DETAILS
Aguilera removal attempted unsuccessful urology Dr. Lawrence called advised to medicate patient and try again removal successful and new Aguilera placed patient will be discharged back to facility

## 2025-01-06 NOTE — ED PROVIDER NOTE - CLINICAL SUMMARY MEDICAL DECISION MAKING FREE TEXT BOX
54 M here after jacques was unable to be removed for change. patient after urology discussion, patient medicated and jacques removed.

## 2025-01-06 NOTE — ED PROVIDER NOTE - ATTENDING APP SHARED VISIT CONTRIBUTION OF CARE
This was a shared visit with PAULINA. I reviewed and verified the documentation and independently performed the documented MDM. Patient seen and examined by myself.

## 2025-01-10 ENCOUNTER — APPOINTMENT (OUTPATIENT)
Dept: WOUND CARE | Facility: HOSPITAL | Age: 55
End: 2025-01-10
Payer: MEDICAID

## 2025-01-10 ENCOUNTER — OUTPATIENT (OUTPATIENT)
Dept: OUTPATIENT SERVICES | Facility: HOSPITAL | Age: 55
LOS: 1 days | Discharge: HOME | End: 2025-01-10
Payer: MEDICAID

## 2025-01-10 VITALS
DIASTOLIC BLOOD PRESSURE: 85 MMHG | OXYGEN SATURATION: 95 % | WEIGHT: 220 LBS | RESPIRATION RATE: 18 BRPM | HEIGHT: 74 IN | HEART RATE: 67 BPM | BODY MASS INDEX: 28.23 KG/M2 | SYSTOLIC BLOOD PRESSURE: 160 MMHG | TEMPERATURE: 98.4 F

## 2025-01-10 DIAGNOSIS — K25.5 CHRONIC OR UNSPECIFIED GASTRIC ULCER WITH PERFORATION: Chronic | ICD-10-CM

## 2025-01-10 DIAGNOSIS — L89.223 PRESSURE ULCER OF LEFT HIP, STAGE 3: ICD-10-CM

## 2025-01-10 DIAGNOSIS — S98.912A COMPLETE TRAUMATIC AMPUTATION OF LEFT FOOT, LEVEL UNSPECIFIED, INITIAL ENCOUNTER: Chronic | ICD-10-CM

## 2025-01-10 DIAGNOSIS — L89.312 PRESSURE ULCER OF RIGHT BUTTOCK, STAGE 2: ICD-10-CM

## 2025-01-10 DIAGNOSIS — L89.322 PRESSURE ULCER OF LEFT BUTTOCK, STAGE 2: ICD-10-CM

## 2025-01-10 DIAGNOSIS — Z98.890 OTHER SPECIFIED POSTPROCEDURAL STATES: Chronic | ICD-10-CM

## 2025-01-10 PROCEDURE — G0463: CPT

## 2025-01-10 PROCEDURE — 99213 OFFICE O/P EST LOW 20 MIN: CPT

## 2025-01-10 RX ORDER — HYDROPHILIC CREAM
PASTE (GRAM) TOPICAL
Qty: 60 | Refills: 6 | Status: ACTIVE | COMMUNITY
Start: 2025-01-10 | End: 1900-01-01

## 2025-01-10 NOTE — HISTORY OF PRESENT ILLNESS
[FreeTextEntry1] : left hip pressure ulcer is small, healing buttock ulcer is closed, no C/O 12/27/24 left hip appears closed. Mild excoriation right buttock. No SOI Right heel healthy granulation tissue. Anterior ankle dry and closing No SOI 1/10/25 left hip ulcer closed. Bilateral buttock with mild excoriation. No SOI

## 2025-01-10 NOTE — PLAN
[FreeTextEntry1] : left hip ulcer and bilateral buttock ulcers Triad cream,DD bilateral buttock 3X/week/PRN Allevyn left hip ulcer 3X/week return to office in two weeks.  25 minutes spent for patient care and medical decision making.

## 2025-01-11 NOTE — ASSESSMENT
[Verbal] : Verbal [Written] : Written [Demo] : Demo [Patient] : Patient [Caregiver] : Caregiver [Good - alert, interested, motivated] : Good - alert, interested, motivated [Needs reinforcement] : needs reinforcement [Dressing changes] : dressing changes [Skin Care] : skin care [Pressure relief] : pressure relief [Signs and symptoms of infection] : sign and symptoms of infection [Nutrition] : nutrition [How and When to Call] : how and when to call [Pain Management] : pain management [Patient responsibility to plan of care] : patient responsibility to plan of care [Glycemic Control] : glycemic control [Stable] : stable [Other: ____] : [unfilled] [Wheelchair] : Wheelchair [Not Applicable - Long Term Care/Home Health Agency] : Long Term Care/Home Health Agency: Not Applicable [] : No [FreeTextEntry2] : Infection Prevention Wound care and Dressing changes Promote and Restore optimal skin integrity Nutrition and Wound Healing  Offloading and Pressure relief Protect and Guard wound site  Maintain acceptable levels of Pain Compliance R/T Elevation of Lower Extremities [FreeTextEntry4] : DOUBLE ENCOUNTER. DPM assessed and advised wound healing nicely. Patient verbalized understanding of all discussed. Facility paperwork completed and given to Care Aide, along with WCC Instructions. F/U 2 weeks

## 2025-01-11 NOTE — PHYSICAL EXAM
[4 x 4] : 4 x 4  [Abdominal Pad] : Abdominal Pad [Normal Breath Sounds] : Normal breath sounds [Normal Heart Sounds] : normal heart sounds [Alert] : alert [Oriented to Person] : oriented to person [Oriented to Place] : oriented to place [Oriented to Time] : oriented to time [Calm] : calm [de-identified] : Well-developed, Well-nourished in no acute distress. [de-identified] : Within normal limits. [de-identified] : Left hip with very small ul;cer, base is red and viable, no drainage, no odor, no acute infection, periwound skin is intact with no cellulitis. Buttock is only slightly,macerated. Heel ulcer is clean, base is granular, no acute infection. [de-identified] : Scattered excoriations  [de-identified] : 0.1 [de-identified] : Excoriation  [de-identified] : Triad [de-identified] : Mechanically cleansed with sterile gauze and 0.9% normal saline.  [de-identified] : scattered excoriations [de-identified] : 0.1 [de-identified] : Excoriation  [de-identified] : Triad [de-identified] : Mechanically cleansed with sterile gauze and 0.9% normal saline.  [FreeTextEntry2] : 5.0 [FreeTextEntry3] : 6.8 [FreeTextEntry4] : 0.1 [de-identified] : Serosanguinous w/ slight green [de-identified] : Hyperpigmentation at 6 o'clock [de-identified] : Vee [de-identified] : Mechanically cleansed with sterile gauze and 0.9% normal saline. Dry Dressing Abdominal Pad Kerlix [FreeTextEntry8] : 4.0 [FreeTextEntry9] : 4.5 [de-identified] : 0.1 [de-identified] : No Product [de-identified] : Mechanically cleansed with sterile gauze and 0.9% normal saline. Abdominal Pad Kerlix [FreeTextEntry1] : Right Medial Leg (CLOSED) [FreeTextEntry7] : Left Hip - See MD Note [de-identified] : Left Buttock - See MD Note [de-identified] : Right Buttock - See MD Note [TWNoteComboBox4] : Moderate [TWNoteComboBox5] : No [de-identified] : other [de-identified] : None [de-identified] : No [de-identified] : Every other day [de-identified] : Daily [de-identified] : False [de-identified] : False [de-identified] : False [de-identified] : False [de-identified] : False [de-identified] : False [de-identified] : False [de-identified] : False [de-identified] : False [de-identified] : False [de-identified] : False [de-identified] : False [de-identified] : False [de-identified] : False [de-identified] : False [de-identified] : False [de-identified] : False [de-identified] : False [de-identified] : False [de-identified] : False [de-identified] : False [de-identified] : False [de-identified] : False [de-identified] : False [de-identified] : False [de-identified] : False [de-identified] : False [de-identified] : False [de-identified] : False

## 2025-01-11 NOTE — VITALS
[Throbbing] : throbbing [Continuous] : continuous [Pain related to present condition?] : The patient's  pain is not related to present condition. [] : No [de-identified] : 5/10 [FreeTextEntry3] : Right Heel [FreeTextEntry1] : Oxycodone 10 mg 2x/day [FreeTextEntry2] : Touch, No Pain Meds [FreeTextEntry4] : Pain Management

## 2025-01-11 NOTE — VITALS
[Throbbing] : throbbing [Continuous] : continuous [Pain related to present condition?] : The patient's  pain is not related to present condition. [] : No [de-identified] : 5/10 [FreeTextEntry3] : Right Heel [FreeTextEntry1] : Oxycodone 10 mg 2x/day [FreeTextEntry2] : Touch, No Pain Meds [FreeTextEntry4] : Pain Management

## 2025-01-11 NOTE — PHYSICAL EXAM
[4 x 4] : 4 x 4  [Abdominal Pad] : Abdominal Pad [Normal Breath Sounds] : Normal breath sounds [Normal Heart Sounds] : normal heart sounds [Alert] : alert [Oriented to Person] : oriented to person [Oriented to Place] : oriented to place [Oriented to Time] : oriented to time [Calm] : calm [de-identified] : Well-developed, Well-nourished in no acute distress. [de-identified] : Within normal limits. [de-identified] : Left hip with very small ul;cer, base is red and viable, no drainage, no odor, no acute infection, periwound skin is intact with no cellulitis. Buttock is only slightly,macerated. Heel ulcer is clean, base is granular, no acute infection. [de-identified] : Scattered excoriations  [de-identified] : 0.1 [de-identified] : Excoriation  [de-identified] : Triad [de-identified] : Mechanically cleansed with sterile gauze and 0.9% normal saline.  [de-identified] : scattered excoriations [de-identified] : 0.1 [de-identified] : Excoriation  [de-identified] : Triad [de-identified] : Mechanically cleansed with sterile gauze and 0.9% normal saline.  [FreeTextEntry2] : 5.0 [FreeTextEntry3] : 6.8 [FreeTextEntry4] : 0.1 [de-identified] : Serosanguinous w/ slight green [de-identified] : Hyperpigmentation at 6 o'clock [de-identified] : Vee [de-identified] : Mechanically cleansed with sterile gauze and 0.9% normal saline. Dry Dressing Abdominal Pad Kerlix [FreeTextEntry8] : 4.0 [FreeTextEntry9] : 4.5 [de-identified] : 0.1 [de-identified] : No Product [de-identified] : Mechanically cleansed with sterile gauze and 0.9% normal saline. Abdominal Pad Kerlix [FreeTextEntry1] : Right Medial Leg (CLOSED) [FreeTextEntry7] : Left Hip - See MD Note [de-identified] : Left Buttock - See MD Note [de-identified] : Right Buttock - See MD Note [TWNoteComboBox5] : No [TWNoteComboBox4] : Moderate [de-identified] : other [de-identified] : None [de-identified] : No [de-identified] : Every other day [de-identified] : Daily [de-identified] : False [de-identified] : False [de-identified] : False [de-identified] : False [de-identified] : False [de-identified] : False [de-identified] : False [de-identified] : False [de-identified] : False [de-identified] : False [de-identified] : False [de-identified] : False [de-identified] : False [de-identified] : False [de-identified] : False [de-identified] : False [de-identified] : False [de-identified] : False [de-identified] : False [de-identified] : False [de-identified] : False [de-identified] : False [de-identified] : False [de-identified] : False [de-identified] : False [de-identified] : False [de-identified] : False [de-identified] : False [de-identified] : False

## 2025-01-11 NOTE — PHYSICAL EXAM
[4 x 4] : 4 x 4  [Abdominal Pad] : Abdominal Pad [Normal Breath Sounds] : Normal breath sounds [Normal Heart Sounds] : normal heart sounds [Alert] : alert [Oriented to Person] : oriented to person [Oriented to Place] : oriented to place [Oriented to Time] : oriented to time [Calm] : calm [de-identified] : Well-developed, Well-nourished in no acute distress. [de-identified] : Within normal limits. [de-identified] : Left hip with very small ul;cer, base is red and viable, no drainage, no odor, no acute infection, periwound skin is intact with no cellulitis. Buttock is only slightly,macerated. Heel ulcer is clean, base is granular, no acute infection. [de-identified] : Scattered excoriations  [de-identified] : 0.1 [de-identified] : Excoriation  [de-identified] : Triad [de-identified] : Mechanically cleansed with sterile gauze and 0.9% normal saline.  [de-identified] : scattered excoriations [de-identified] : 0.1 [de-identified] : Excoriation  [de-identified] : Triad [de-identified] : Mechanically cleansed with sterile gauze and 0.9% normal saline.  [FreeTextEntry2] : 5.0 [FreeTextEntry3] : 6.8 [FreeTextEntry4] : 0.1 [de-identified] : Serosanguinous w/ slight green [de-identified] : Hyperpigmentation at 6 o'clock [de-identified] : Vee [de-identified] : Mechanically cleansed with sterile gauze and 0.9% normal saline. Dry Dressing Abdominal Pad Kerlix [FreeTextEntry8] : 4.0 [FreeTextEntry9] : 4.5 [de-identified] : 0.1 [de-identified] : No Product [de-identified] : Mechanically cleansed with sterile gauze and 0.9% normal saline. Abdominal Pad Kerlix [FreeTextEntry1] : Right Medial Leg (CLOSED) [FreeTextEntry7] : Left Hip - See MD Note [de-identified] : Left Buttock - See MD Note [de-identified] : Right Buttock - See MD Note [TWNoteComboBox5] : No [TWNoteComboBox4] : Moderate [de-identified] : other [de-identified] : None [de-identified] : No [de-identified] : Every other day [de-identified] : Daily [de-identified] : False [de-identified] : False [de-identified] : False [de-identified] : False [de-identified] : False [de-identified] : False [de-identified] : False [de-identified] : False [de-identified] : False [de-identified] : False [de-identified] : False [de-identified] : False [de-identified] : False [de-identified] : False [de-identified] : False [de-identified] : False [de-identified] : False [de-identified] : False [de-identified] : False [de-identified] : False [de-identified] : False [de-identified] : False [de-identified] : False [de-identified] : False [de-identified] : False [de-identified] : False [de-identified] : False [de-identified] : False [de-identified] : False

## 2025-01-11 NOTE — VITALS
[Throbbing] : throbbing [Continuous] : continuous [Pain related to present condition?] : The patient's  pain is not related to present condition. [] : No [de-identified] : 5/10 [FreeTextEntry3] : Right Heel [FreeTextEntry1] : Oxycodone 10 mg 2x/day [FreeTextEntry2] : Touch, No Pain Meds [FreeTextEntry4] : Pain Management

## 2025-01-11 NOTE — VITALS
[Throbbing] : throbbing [Continuous] : continuous [Pain related to present condition?] : The patient's  pain is not related to present condition. [] : No [de-identified] : 5/10 [FreeTextEntry3] : Right Heel [FreeTextEntry1] : Oxycodone 10 mg 2x/day [FreeTextEntry2] : Touch, No Pain Meds [FreeTextEntry4] : Pain Management

## 2025-01-11 NOTE — PHYSICAL EXAM
[4 x 4] : 4 x 4  [Abdominal Pad] : Abdominal Pad [Normal Breath Sounds] : Normal breath sounds [Normal Heart Sounds] : normal heart sounds [Alert] : alert [Oriented to Person] : oriented to person [Oriented to Place] : oriented to place [Oriented to Time] : oriented to time [Calm] : calm [de-identified] : Well-developed, Well-nourished in no acute distress. [de-identified] : Within normal limits. [de-identified] : Left hip with very small ul;cer, base is red and viable, no drainage, no odor, no acute infection, periwound skin is intact with no cellulitis. Buttock is only slightly,macerated. Heel ulcer is clean, base is granular, no acute infection. [de-identified] : Scattered excoriations  [de-identified] : 0.1 [de-identified] : Excoriation  [de-identified] : Triad [de-identified] : Mechanically cleansed with sterile gauze and 0.9% normal saline.  [de-identified] : scattered excoriations [de-identified] : 0.1 [de-identified] : Excoriation  [de-identified] : Triad [de-identified] : Mechanically cleansed with sterile gauze and 0.9% normal saline.  [FreeTextEntry2] : 5.0 [FreeTextEntry3] : 6.8 [FreeTextEntry4] : 0.1 [de-identified] : Serosanguinous w/ slight green [de-identified] : Hyperpigmentation at 6 o'clock [de-identified] : Vee [de-identified] : Mechanically cleansed with sterile gauze and 0.9% normal saline. Dry Dressing Abdominal Pad Kerlix [FreeTextEntry8] : 4.0 [FreeTextEntry9] : 4.5 [de-identified] : 0.1 [de-identified] : No Product [de-identified] : Mechanically cleansed with sterile gauze and 0.9% normal saline. Abdominal Pad Kerlix [FreeTextEntry1] : Right Medial Leg (CLOSED) [FreeTextEntry7] : Left Hip - See MD Note [de-identified] : Left Buttock - See MD Note [de-identified] : Right Buttock - See MD Note [TWNoteComboBox5] : No [TWNoteComboBox4] : Moderate [de-identified] : other [de-identified] : None [de-identified] : No [de-identified] : Every other day [de-identified] : Daily [de-identified] : False [de-identified] : False [de-identified] : False [de-identified] : False [de-identified] : False [de-identified] : False [de-identified] : False [de-identified] : False [de-identified] : False [de-identified] : False [de-identified] : False [de-identified] : False [de-identified] : False [de-identified] : False [de-identified] : False [de-identified] : False [de-identified] : False [de-identified] : False [de-identified] : False [de-identified] : False [de-identified] : False [de-identified] : False [de-identified] : False [de-identified] : False [de-identified] : False [de-identified] : False [de-identified] : False [de-identified] : False [de-identified] : False
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
## 2025-01-22 ENCOUNTER — APPOINTMENT (OUTPATIENT)
Dept: WOUND CARE | Facility: HOSPITAL | Age: 55
End: 2025-01-22
Payer: MEDICAID

## 2025-01-22 ENCOUNTER — OUTPATIENT (OUTPATIENT)
Dept: OUTPATIENT SERVICES | Facility: HOSPITAL | Age: 55
LOS: 1 days | Discharge: HOME | End: 2025-01-22
Payer: MEDICAID

## 2025-01-22 VITALS
HEART RATE: 61 BPM | OXYGEN SATURATION: 95 % | TEMPERATURE: 98.6 F | WEIGHT: 220 LBS | DIASTOLIC BLOOD PRESSURE: 77 MMHG | RESPIRATION RATE: 18 BRPM | BODY MASS INDEX: 28.23 KG/M2 | SYSTOLIC BLOOD PRESSURE: 113 MMHG | HEIGHT: 74 IN

## 2025-01-22 DIAGNOSIS — K25.5 CHRONIC OR UNSPECIFIED GASTRIC ULCER WITH PERFORATION: Chronic | ICD-10-CM

## 2025-01-22 DIAGNOSIS — Z98.890 OTHER SPECIFIED POSTPROCEDURAL STATES: Chronic | ICD-10-CM

## 2025-01-22 DIAGNOSIS — L89.322 PRESSURE ULCER OF LEFT BUTTOCK, STAGE 2: ICD-10-CM

## 2025-01-22 DIAGNOSIS — E87.6 HYPOKALEMIA: ICD-10-CM

## 2025-01-22 DIAGNOSIS — L97.413 NON-PRESSURE CHRONIC ULCER OF RIGHT HEEL AND MIDFOOT WITH NECROSIS OF MUSCLE: ICD-10-CM

## 2025-01-22 DIAGNOSIS — L89.223 PRESSURE ULCER OF LEFT HIP, STAGE 3: ICD-10-CM

## 2025-01-22 DIAGNOSIS — Z99.3 DEPENDENCE ON WHEELCHAIR: ICD-10-CM

## 2025-01-22 DIAGNOSIS — E11.40 TYPE 2 DIABETES MELLITUS WITH DIABETIC NEUROPATHY, UNSPECIFIED: ICD-10-CM

## 2025-01-22 DIAGNOSIS — Z87.440 PERSONAL HISTORY OF URINARY (TRACT) INFECTIONS: ICD-10-CM

## 2025-01-22 DIAGNOSIS — M86.171 OTHER ACUTE OSTEOMYELITIS, RIGHT ANKLE AND FOOT: ICD-10-CM

## 2025-01-22 DIAGNOSIS — E56.9 VITAMIN DEFICIENCY, UNSPECIFIED: ICD-10-CM

## 2025-01-22 DIAGNOSIS — Z89.432 ACQUIRED ABSENCE OF LEFT FOOT: ICD-10-CM

## 2025-01-22 DIAGNOSIS — Z87.891 PERSONAL HISTORY OF NICOTINE DEPENDENCE: ICD-10-CM

## 2025-01-22 DIAGNOSIS — E11.59 TYPE 2 DIABETES MELLITUS WITH OTHER CIRCULATORY COMPLICATIONS: ICD-10-CM

## 2025-01-22 DIAGNOSIS — I25.2 OLD MYOCARDIAL INFARCTION: ICD-10-CM

## 2025-01-22 DIAGNOSIS — Z79.01 LONG TERM (CURRENT) USE OF ANTICOAGULANTS: ICD-10-CM

## 2025-01-22 DIAGNOSIS — S98.912A COMPLETE TRAUMATIC AMPUTATION OF LEFT FOOT, LEVEL UNSPECIFIED, INITIAL ENCOUNTER: Chronic | ICD-10-CM

## 2025-01-22 DIAGNOSIS — K59.00 CONSTIPATION, UNSPECIFIED: ICD-10-CM

## 2025-01-22 DIAGNOSIS — D64.9 ANEMIA, UNSPECIFIED: ICD-10-CM

## 2025-01-22 DIAGNOSIS — Z79.84 LONG TERM (CURRENT) USE OF ORAL HYPOGLYCEMIC DRUGS: ICD-10-CM

## 2025-01-22 DIAGNOSIS — Z79.899 OTHER LONG TERM (CURRENT) DRUG THERAPY: ICD-10-CM

## 2025-01-22 DIAGNOSIS — E11.69 TYPE 2 DIABETES MELLITUS WITH OTHER SPECIFIED COMPLICATION: ICD-10-CM

## 2025-01-22 DIAGNOSIS — Z86.73 PERSONAL HISTORY OF TRANSIENT ISCHEMIC ATTACK (TIA), AND CEREBRAL INFARCTION WITHOUT RESIDUAL DEFICITS: ICD-10-CM

## 2025-01-22 DIAGNOSIS — M86.671 OTHER CHRONIC OSTEOMYELITIS, RIGHT ANKLE AND FOOT: ICD-10-CM

## 2025-01-22 PROCEDURE — G0463: CPT

## 2025-01-22 PROCEDURE — 99213 OFFICE O/P EST LOW 20 MIN: CPT

## 2025-01-22 NOTE — ASSESSMENT
[Verbal] : Verbal [Written] : Written [Demo] : Demo [Patient] : Patient [Caregiver] : Caregiver [Good - alert, interested, motivated] : Good - alert, interested, motivated [Needs reinforcement] : needs reinforcement [Dressing changes] : dressing changes [Skin Care] : skin care [Pressure relief] : pressure relief [Signs and symptoms of infection] : sign and symptoms of infection [Nutrition] : nutrition [How and When to Call] : how and when to call [Pain Management] : pain management [Patient responsibility to plan of care] : patient responsibility to plan of care [Glycemic Control] : glycemic control [] : Yes [Stable] : stable [Other: ____] : [unfilled] [Wheelchair] : Wheelchair [Not Applicable - Long Term Care/Home Health Agency] : Long Term Care/Home Health Agency: Not Applicable [FreeTextEntry2] : Infection Prevention Wound care and Dressing changes Promote and Restore optimal skin integrity Nutrition and Wound Healing  Offloading and Pressure relief Protect and Guard wound site  Maintain acceptable levels of Pain Compliance R/T Elevation of Lower Extremities [FreeTextEntry4] : DOUBLE ENCOUNTER. DPM cauterized the right heel due to hyper granulated tissue Facility paperwork completed and given to Care Aide to return to [facility] staff, along with [facility] Instructions. F/U 1 week [FreeTextEntry1] : [place]

## 2025-01-22 NOTE — REVIEW OF SYSTEMS
[Fever] : no fever [Eye Pain] : no eye pain [Chills] : no chills [Earache] : no earache [Loss Of Hearing] : no hearing loss [Chest Pain] : no chest pain [Shortness Of Breath] : no shortness of breath [Abdominal Pain] : no abdominal pain [Skin Wound] : skin wound [Limb Weakness] : limb weakness [Anxiety] : no anxiety [Easy Bleeding] : a tendency for easy bleeding [FreeTextEntry5] : htn, hld diabetic cardio vascular disease  [FreeTextEntry9] : s/p left foot TMA w/Achilles tenotomy [de-identified] : left foot DFU3, s/p TMA-  healed, right heel wound down to bone , s/p partial calcanectomy with (+) OM on path [de-identified] : diabetic neuropathy [de-identified] : IDDM with neuropathy

## 2025-01-22 NOTE — PHYSICAL EXAM
[4 x 4] : 4 x 4  [Abdominal Pad] : Abdominal Pad [1+] : left 1+ [Ankle Swelling (On Exam)] : present [Ankle Swelling On The Left] : moderate [Varicose Veins Of Lower Extremities] : not present [] : not present [Skin Ulcer] : ulcer [Alert] : alert [Oriented to Person] : oriented to person [Oriented to Place] : oriented to place [Calm] : calm [de-identified] : calm [de-identified] : htn, hld, diabetic cardiovascular disease [de-identified] : s/p left TMA and Achilles tenotomy, pt has been non ambulatory since february  [de-identified] : left foot TMA - closed.  Right posterior heel wound of skin, subcutaneous tissue, fat  - nited hypergranular tissue  [de-identified] : IDDM with neuropathy  [de-identified] : AgNO3 used to distal hyper granulated tissue. [FreeTextEntry2] : 9.0 [FreeTextEntry3] : 3.2 [FreeTextEntry4] : 0.1- hyper granulation [de-identified] : Serosanguinous w/ slight green [de-identified] : Hyperpigmentation at 6 o'clock. Mild circumferential maceration  [de-identified] : 0.1- fragile epithelium [de-identified] : Ecchymosis [de-identified] : No Product [de-identified] : Mechanically cleansed with sterile gauze and 0.9% normal saline. Abdominal Pad Kerlix [de-identified] : ABD [FreeTextEntry1] : Right Medial Leg (CLOSED) [de-identified] : Mechanically cleansed with 0.9% normal saline and sterile 4x4 gauze [FreeTextEntry7] : Left Hip - Closed See MD Note [de-identified] : Left Buttock - See MD Note [de-identified] : Triad [de-identified] : Mechanically cleansed with 0.9% normal saline and sterile 4x4 gauze [de-identified] : Right Buttock - See MD Note [de-identified] : Triad paster [de-identified] : Mechanically cleansed with 0.9% normal saline and sterile 4x4 gauze [TWNoteComboBox4] : Moderate [TWNoteComboBox5] : No [de-identified] : No [de-identified] : other [de-identified] : Mild [de-identified] : None [de-identified] : 100% [de-identified] : No [de-identified] : 3x Weekly [de-identified] : Primary Dressing [de-identified] : None [de-identified] : No [de-identified] : Pressure [de-identified] : No [de-identified] : None [de-identified] : Erythema [de-identified] : None [de-identified] : 100% [de-identified] : No [de-identified] : Daily [de-identified] : Primary Dressing [de-identified] : Other [de-identified] : Daily [de-identified] : Other [de-identified] : Daily [de-identified] : Other

## 2025-01-22 NOTE — HISTORY OF PRESENT ILLNESS
[FreeTextEntry1] : Patient is 54 year old male presenting for f/u s/p right heel partial calcanectomy as on 09/10/24. Patient with (+) OM acute and chronic on pathology. Patient is at rehab and has been wheel chair bound. Patient states has  been keeping the right heel offloaded with pillows.

## 2025-01-22 NOTE — PHYSICAL EXAM
[4 x 4] : 4 x 4  [Abdominal Pad] : Abdominal Pad [1+] : left 1+ [Ankle Swelling (On Exam)] : present [Ankle Swelling On The Left] : moderate [Varicose Veins Of Lower Extremities] : not present [] : not present [Skin Ulcer] : ulcer [Alert] : alert [Oriented to Person] : oriented to person [Oriented to Place] : oriented to place [Calm] : calm [de-identified] : calm [de-identified] : htn, hld, diabetic cardiovascular disease [de-identified] : s/p left TMA and Achilles tenotomy, pt has been non ambulatory since february  [de-identified] : left foot TMA - closed.  Right posterior heel wound of skin, subcutaneous tissue, fat  - nited hypergranular tissue  [de-identified] : IDDM with neuropathy  [de-identified] : AgNO3 used to distal hyper granulated tissue. [FreeTextEntry2] : 9.0 [FreeTextEntry3] : 3.2 [FreeTextEntry4] : 0.1- hyper granulation [de-identified] : Serosanguinous w/ slight green [de-identified] : Hyperpigmentation at 6 o'clock. Mild circumferential maceration  [de-identified] : 0.1- fragile epithelium [de-identified] : Ecchymosis [de-identified] : No Product [de-identified] : Mechanically cleansed with sterile gauze and 0.9% normal saline. Abdominal Pad Kerlix [FreeTextEntry1] : Right Medial Leg (CLOSED) [de-identified] : ABD [de-identified] : Mechanically cleansed with 0.9% normal saline and sterile 4x4 gauze [FreeTextEntry7] : Left Hip - Closed See MD Note [de-identified] : Left Buttock - See MD Note [de-identified] : Triad [de-identified] : Mechanically cleansed with 0.9% normal saline and sterile 4x4 gauze [de-identified] : Right Buttock - See MD Note [de-identified] : Triad paster [de-identified] : Mechanically cleansed with 0.9% normal saline and sterile 4x4 gauze [TWNoteComboBox4] : Moderate [TWNoteComboBox5] : No [de-identified] : No [de-identified] : other [de-identified] : Mild [de-identified] : None [de-identified] : 100% [de-identified] : No [de-identified] : 3x Weekly [de-identified] : Primary Dressing [de-identified] : None [de-identified] : No [de-identified] : Pressure [de-identified] : No [de-identified] : Erythema [de-identified] : None [de-identified] : None [de-identified] : 100% [de-identified] : No [de-identified] : Daily [de-identified] : Primary Dressing [de-identified] : Other [de-identified] : Daily [de-identified] : Other [de-identified] : Daily [de-identified] : Other

## 2025-01-22 NOTE — PLAN
[FreeTextEntry1] : .Patient seen and evaluated. Discussed etiology and treatment plan. Patient verbalized understanding. Applied silver nitrate to the distal aspect of the wound in the area of hypergranulation tissue. C/w local wound care and offloading. Patient is currently in rehab and will return in one week. Patient has been non weight bearing and has been in wheelchair. Patient is high risk for further surgical intervention, sepsis, loss of limb and loss of life. Spent 20 minutes for patient care and medical decision making.

## 2025-01-22 NOTE — REVIEW OF SYSTEMS
[Fever] : no fever [Chills] : no chills [Eye Pain] : no eye pain [Earache] : no earache [Loss Of Hearing] : no hearing loss [Chest Pain] : no chest pain [Shortness Of Breath] : no shortness of breath [Abdominal Pain] : no abdominal pain [Skin Wound] : skin wound [Limb Weakness] : limb weakness [Anxiety] : no anxiety [Easy Bleeding] : a tendency for easy bleeding [FreeTextEntry5] : htn, hld diabetic cardio vascular disease  [FreeTextEntry9] : s/p left foot TMA w/Achilles tenotomy [de-identified] : left foot DFU3, s/p TMA-  healed, right heel wound down to bone , s/p partial calcanectomy with (+) OM on path [de-identified] : diabetic neuropathy [de-identified] : IDDM with neuropathy

## 2025-01-22 NOTE — VITALS
[Throbbing] : throbbing [Continuous] : continuous [Pain related to present condition?] : The patient's  pain is not related to present condition. [] : No [de-identified] : 5/10 [FreeTextEntry1] : Oxycodone 10 mg 2x/day [FreeTextEntry3] : Right Heel [FreeTextEntry2] : Pressure, No Pain Meds

## 2025-01-22 NOTE — VITALS
[Throbbing] : throbbing [Continuous] : continuous [Pain related to present condition?] : The patient's  pain is not related to present condition. [] : No [de-identified] : 5/10 [FreeTextEntry1] : Oxycodone 10 mg 2x/day [FreeTextEntry3] : Right Heel [FreeTextEntry2] : Pressure, No Pain Meds

## 2025-01-22 NOTE — ASSESSMENT
[Verbal] : Verbal [Written] : Written [Demo] : Demo [Patient] : Patient [Caregiver] : Caregiver [Good - alert, interested, motivated] : Good - alert, interested, motivated [Needs reinforcement] : needs reinforcement [Dressing changes] : dressing changes [Skin Care] : skin care [Pressure relief] : pressure relief [Signs and symptoms of infection] : sign and symptoms of infection [Nutrition] : nutrition [How and When to Call] : how and when to call [Pain Management] : pain management [Patient responsibility to plan of care] : patient responsibility to plan of care [Glycemic Control] : glycemic control [] : Yes [Stable] : stable [Other: ____] : [unfilled] [Wheelchair] : Wheelchair [Not Applicable - Long Term Care/Home Health Agency] : Long Term Care/Home Health Agency: Not Applicable [FreeTextEntry2] : Infection Prevention Wound care and Dressing changes Promote and Restore optimal skin integrity Nutrition and Wound Healing  Offloading and Pressure relief Protect and Guard wound site  Maintain acceptable levels of Pain Compliance R/T Elevation of Lower Extremities [FreeTextEntry4] : DOUBLE ENCOUNTER. DPM cauterized the right heel due to hyper granulated tissue Facility paperwork completed and given to Care Aide to return to [facility] staff, along with [facility] Instructions. F/U 1 week [FreeTextEntry1] : [place]

## 2025-01-23 DIAGNOSIS — E56.9 VITAMIN DEFICIENCY, UNSPECIFIED: ICD-10-CM

## 2025-01-23 DIAGNOSIS — Z86.73 PERSONAL HISTORY OF TRANSIENT ISCHEMIC ATTACK (TIA), AND CEREBRAL INFARCTION WITHOUT RESIDUAL DEFICITS: ICD-10-CM

## 2025-01-23 DIAGNOSIS — L97.412 NON-PRESSURE CHRONIC ULCER OF RIGHT HEEL AND MIDFOOT WITH FAT LAYER EXPOSED: ICD-10-CM

## 2025-01-23 DIAGNOSIS — Z79.01 LONG TERM (CURRENT) USE OF ANTICOAGULANTS: ICD-10-CM

## 2025-01-23 DIAGNOSIS — L89.322 PRESSURE ULCER OF LEFT BUTTOCK, STAGE 2: ICD-10-CM

## 2025-01-23 DIAGNOSIS — E87.6 HYPOKALEMIA: ICD-10-CM

## 2025-01-23 DIAGNOSIS — I25.2 OLD MYOCARDIAL INFARCTION: ICD-10-CM

## 2025-01-23 DIAGNOSIS — Z89.432 ACQUIRED ABSENCE OF LEFT FOOT: ICD-10-CM

## 2025-01-23 DIAGNOSIS — M86.671 OTHER CHRONIC OSTEOMYELITIS, RIGHT ANKLE AND FOOT: ICD-10-CM

## 2025-01-23 DIAGNOSIS — M86.171 OTHER ACUTE OSTEOMYELITIS, RIGHT ANKLE AND FOOT: ICD-10-CM

## 2025-01-23 DIAGNOSIS — K59.00 CONSTIPATION, UNSPECIFIED: ICD-10-CM

## 2025-01-23 DIAGNOSIS — Z79.84 LONG TERM (CURRENT) USE OF ORAL HYPOGLYCEMIC DRUGS: ICD-10-CM

## 2025-01-23 DIAGNOSIS — Z87.440 PERSONAL HISTORY OF URINARY (TRACT) INFECTIONS: ICD-10-CM

## 2025-01-23 DIAGNOSIS — E11.40 TYPE 2 DIABETES MELLITUS WITH DIABETIC NEUROPATHY, UNSPECIFIED: ICD-10-CM

## 2025-01-23 DIAGNOSIS — D64.9 ANEMIA, UNSPECIFIED: ICD-10-CM

## 2025-01-23 DIAGNOSIS — Z87.891 PERSONAL HISTORY OF NICOTINE DEPENDENCE: ICD-10-CM

## 2025-01-23 DIAGNOSIS — E11.69 TYPE 2 DIABETES MELLITUS WITH OTHER SPECIFIED COMPLICATION: ICD-10-CM

## 2025-01-23 DIAGNOSIS — E11.59 TYPE 2 DIABETES MELLITUS WITH OTHER CIRCULATORY COMPLICATIONS: ICD-10-CM

## 2025-01-23 DIAGNOSIS — Z79.899 OTHER LONG TERM (CURRENT) DRUG THERAPY: ICD-10-CM

## 2025-01-23 NOTE — PHYSICAL EXAM
[Normal Thyroid] : the thyroid was normal [Normal Breath Sounds] : Normal breath sounds [Normal Heart Sounds] : normal heart sounds [Normal Rate and Rhythm] : normal rate and rhythm [Alert] : alert [Oriented to Person] : oriented to person [Oriented to Place] : oriented to place [Oriented to Time] : oriented to time [Calm] : calm [4 x 4] : 4 x 4  [Abdominal Pad] : Abdominal Pad [JVD] : no jugular venous distention  [Abdomen Masses] : No abdominal massess [Abdomen Tenderness] : ~T ~M No abdominal tenderness [Tender] : nontender [Enlarged] : not enlarged [de-identified] : adult WM, NAD, alert, Ox3. [FreeTextEntry1] : Right Medial Leg (CLOSED) [de-identified] : Dry dressing [de-identified] : Mechanically cleansed with 0.9% normal saline and sterile 4x4 gauze [FreeTextEntry7] : Left Hip (HEALED) [de-identified] : No Product [de-identified] : Mechanically cleansed with sterile gauze and 0.9% normal saline.  [de-identified] : Left Buttock [de-identified] : Scattered excoriations  [de-identified] : 0.1 [de-identified] : Excoriation  [de-identified] : Triad cream [de-identified] : Mechanically cleansed with sterile gauze and 0.9% normal saline.  [de-identified] : Right Buttock [de-identified] : scattered excoriations [de-identified] : 0.1 [de-identified] : Excoriation  [de-identified] : Triad cream [de-identified] : Mechanically cleansed with sterile gauze and 0.9% normal saline.  [de-identified] : 3x Weekly [de-identified] : Other [de-identified] : None [de-identified] : None [de-identified] : No [de-identified] : Pressure [de-identified] : No [de-identified] : other [de-identified] : None [de-identified] : None [de-identified] : No [de-identified] : Every other day [de-identified] : Other [de-identified] : None [de-identified] : No [de-identified] : Pressure [de-identified] : No [de-identified] : other [de-identified] : None [de-identified] : None [de-identified] : 100% [de-identified] : No [de-identified] : Every other day [de-identified] : Other

## 2025-01-23 NOTE — VITALS
[Pain related to present condition?] : The patient's  pain is not related to present condition. [] : No [de-identified] : 0/10

## 2025-01-23 NOTE — ASSESSMENT
[] : No [FreeTextEntry2] : Infection Prevention Wound care and Dressing changes Promote and Restore optimal skin integrity Nutrition and Wound Healing  Offloading and Pressure relief Protect and Guard wound site  Maintain acceptable levels of Pain Compliance R/T Elevation of Lower Extremities [FreeTextEntry4] : DOUBLE ENCOUNTER. Triad cream to be light wiped to remove before re applying. Pt was advised that an ABD pad is sufficient for coverage of the right and left buttock. Patient verbalized understanding of all discussed. Facility paperwork completed and given to Care Aide, along with [facility] Instructions. F/U 1 week

## 2025-01-23 NOTE — HISTORY OF PRESENT ILLNESS
[FreeTextEntry1] : 53 yo WM, here with his aide for f/u of chronic bilateral buttock pressure ulcers. Only scattered excoriations noted. No SOI. Discussed importance of offload/frequent rotation/change of position throughout the day/night.       Also seen by podiatry for his feet.

## 2025-01-23 NOTE — HISTORY OF PRESENT ILLNESS
[FreeTextEntry1] : 55 yo WM, here with his aide for f/u of chronic bilateral buttock pressure ulcers. Only scattered excoriations noted. No SOI. Discussed importance of offload/frequent rotation/change of position throughout the day/night.       Also seen by podiatry for his feet.

## 2025-01-23 NOTE — VITALS
[Pain related to present condition?] : The patient's  pain is not related to present condition. [] : No [de-identified] : 0/10

## 2025-01-23 NOTE — PLAN
[FreeTextEntry1] : offload; frequent rotation/change of position triad, DD qod f/u 1 wk  time spent 25 mins.

## 2025-01-23 NOTE — PHYSICAL EXAM
[Normal Thyroid] : the thyroid was normal [Normal Breath Sounds] : Normal breath sounds [Normal Heart Sounds] : normal heart sounds [Normal Rate and Rhythm] : normal rate and rhythm [Alert] : alert [Oriented to Person] : oriented to person [Oriented to Place] : oriented to place [Oriented to Time] : oriented to time [Calm] : calm [4 x 4] : 4 x 4  [Abdominal Pad] : Abdominal Pad [JVD] : no jugular venous distention  [Abdomen Masses] : No abdominal massess [Abdomen Tenderness] : ~T ~M No abdominal tenderness [Tender] : nontender [Enlarged] : not enlarged [de-identified] : adult WM, NAD, alert, Ox3. [FreeTextEntry1] : Right Medial Leg (CLOSED) [de-identified] : Dry dressing [de-identified] : Mechanically cleansed with 0.9% normal saline and sterile 4x4 gauze [FreeTextEntry7] : Left Hip (HEALED) [de-identified] : No Product [de-identified] : Mechanically cleansed with sterile gauze and 0.9% normal saline.  [de-identified] : Left Buttock [de-identified] : Scattered excoriations  [de-identified] : 0.1 [de-identified] : Excoriation  [de-identified] : Triad cream [de-identified] : Mechanically cleansed with sterile gauze and 0.9% normal saline.  [de-identified] : Right Buttock [de-identified] : scattered excoriations [de-identified] : 0.1 [de-identified] : Excoriation  [de-identified] : Triad cream [de-identified] : Mechanically cleansed with sterile gauze and 0.9% normal saline.  [de-identified] : 3x Weekly [de-identified] : Other [de-identified] : None [de-identified] : None [de-identified] : No [de-identified] : Pressure [de-identified] : No [de-identified] : other [de-identified] : None [de-identified] : None [de-identified] : No [de-identified] : Every other day [de-identified] : Other [de-identified] : None [de-identified] : No [de-identified] : Pressure [de-identified] : No [de-identified] : other [de-identified] : None [de-identified] : None [de-identified] : 100% [de-identified] : No [de-identified] : Every other day [de-identified] : Other

## 2025-01-28 NOTE — PHYSICAL EXAM
[JVD] : no jugular venous distention  [Normal Thyroid] : the thyroid was normal [Normal Breath Sounds] : Normal breath sounds [Normal Heart Sounds] : normal heart sounds [Normal Rate and Rhythm] : normal rate and rhythm [Abdomen Masses] : No abdominal massess [Abdomen Tenderness] : ~T ~M No abdominal tenderness [Tender] : nontender [Enlarged] : not enlarged [Alert] : alert [Oriented to Person] : oriented to person [Oriented to Place] : oriented to place [Oriented to Time] : oriented to time [Calm] : calm [de-identified] : adult WM, NAD, alert, Ox3 show [FreeTextEntry1] : Right medial leg - Fragile epithelial tissue  - closed [FreeTextEntry7] : Left hip  [FreeTextEntry8] : 0.4 [FreeTextEntry9] : 0.3 [de-identified] : 0.5  [de-identified] : Serous/sanguinous [de-identified] : 0.5cm @ 10-12 o 'clock  [de-identified] : Allevyn Ag [de-identified] : Mechanically cleansed with 0.9% Normal Saline Cloth tape  [de-identified] : Left buttock [de-identified] : Scattered excoriations  [de-identified] : 0.1 [de-identified] : Excoriation  [de-identified] : Allevyn  [de-identified] : Mechanically cleansed with 0.9% Normal Saline cloth tape [de-identified] : Right buttock [de-identified] : scattered excoriations [de-identified] : 0.1 [de-identified] : Excoriation  [de-identified] : Allevyn  [de-identified] : Mechanically cleansed with 0.9% Normal Saline Cloth tape  [TWNoteComboBox4] : None [de-identified] : Small [de-identified] : No [de-identified] : Pressure [de-identified] : Yes [de-identified] : Normal [de-identified] : None [de-identified] : None [de-identified] : 100% [de-identified] : No [de-identified] : 3x Weekly [de-identified] : Primary Dressing [de-identified] : None [de-identified] : No [de-identified] : Pressure [de-identified] : No [de-identified] : other [de-identified] : None [de-identified] : None [de-identified] : 100% [de-identified] : No [de-identified] : 3x Weekly [de-identified] : Primary Dressing [de-identified] : None [de-identified] : No [de-identified] : Pressure [de-identified] : No [de-identified] : other [de-identified] : None [de-identified] : None [de-identified] : 100% [de-identified] : No [de-identified] : 3x Weekly [de-identified] : Primary Dressing

## 2025-01-29 ENCOUNTER — APPOINTMENT (OUTPATIENT)
Dept: WOUND CARE | Facility: HOSPITAL | Age: 55
End: 2025-01-29
Payer: MEDICAID

## 2025-01-29 ENCOUNTER — OUTPATIENT (OUTPATIENT)
Dept: OUTPATIENT SERVICES | Facility: HOSPITAL | Age: 55
LOS: 1 days | Discharge: HOME | End: 2025-01-29
Payer: MEDICAID

## 2025-01-29 VITALS
HEART RATE: 59 BPM | SYSTOLIC BLOOD PRESSURE: 152 MMHG | DIASTOLIC BLOOD PRESSURE: 86 MMHG | TEMPERATURE: 98.5 F | OXYGEN SATURATION: 95 % | RESPIRATION RATE: 16 BRPM

## 2025-01-29 DIAGNOSIS — Z98.890 OTHER SPECIFIED POSTPROCEDURAL STATES: Chronic | ICD-10-CM

## 2025-01-29 DIAGNOSIS — S98.912A COMPLETE TRAUMATIC AMPUTATION OF LEFT FOOT, LEVEL UNSPECIFIED, INITIAL ENCOUNTER: Chronic | ICD-10-CM

## 2025-01-29 DIAGNOSIS — S91.309A UNSPECIFIED OPEN WOUND, UNSPECIFIED FOOT, INITIAL ENCOUNTER: ICD-10-CM

## 2025-01-29 DIAGNOSIS — K25.5 CHRONIC OR UNSPECIFIED GASTRIC ULCER WITH PERFORATION: Chronic | ICD-10-CM

## 2025-01-29 DIAGNOSIS — L89.223 PRESSURE ULCER OF LEFT HIP, STAGE 3: ICD-10-CM

## 2025-01-29 PROCEDURE — G0463: CPT

## 2025-01-29 PROCEDURE — 99213 OFFICE O/P EST LOW 20 MIN: CPT

## 2025-01-29 NOTE — ASSESSMENT
[Verbal] : Verbal [Written] : Written [Demo] : Demo [Patient] : Patient [Good - alert, interested, motivated] : Good - alert, interested, motivated [Verbalizes knowledge/Understanding] : Verbalizes knowledge/understanding [Dressing changes] : dressing changes [Skin Care] : skin care [Pressure relief] : pressure relief [Signs and symptoms of infection] : sign and symptoms of infection [Nutrition] : nutrition [How and When to Call] : how and when to call [Pain Management] : pain management [] : Yes [FreeTextEntry2] : Infection prevention Localized wound care  Goal remaining pain free regarding wounds frequent turning and repositioning   Promote optimal skin care and nutrition. [FreeTextEntry4] : Education reinforced on the importance of turning and repositioning at least every 2 hours to alleviate pressure to the affected area.  Supply script provided along with wound care instructions given to patient to bring back to facility.   Follow up in 2 weeks  [Stable] : stable [Other: ____] : [unfilled] [Wheelchair] : Wheelchair [Not Applicable - Long Term Care/Home Health Agency] : Long Term Care/Home Health Agency: Not Applicable

## 2025-01-29 NOTE — HISTORY OF PRESENT ILLNESS
[FreeTextEntry1] : 53 yo WM, w/c bound, here for f/u of bilateral buttock pressure ulcers. Only scattered areas of excoriations on both side noted. No SOI. Reviewed importance of offload/frequent roation/change of position throughout the day/night. Here with his aide.

## 2025-01-29 NOTE — PHYSICAL EXAM
[Normal Thyroid] : the thyroid was normal [Normal Breath Sounds] : Normal breath sounds [Normal Heart Sounds] : normal heart sounds [Normal Rate and Rhythm] : normal rate and rhythm [Alert] : alert [Oriented to Person] : oriented to person [Oriented to Place] : oriented to place [Oriented to Time] : oriented to time [Calm] : calm [JVD] : no jugular venous distention  [Abdomen Masses] : No abdominal massess [Abdomen Tenderness] : ~T ~M No abdominal tenderness [Tender] : nontender [Enlarged] : not enlarged [de-identified] : adult WM, NAD, alert. Ox3 [4 x 4] : 4 x 4  [Abdominal Pad] : Abdominal Pad [FreeTextEntry7] : Right dorsal foot - See Dr. Howard note [de-identified] : left buttock - fragile epithelial tissue  [de-identified] : Triad cream  [de-identified] : Mechanically cleansed with sterile gauze and normal saline. Cloth tape  [FreeTextEntry1] : Right buttocks - fragile epithelial tissue  [de-identified] : Triad cream  [de-identified] : Mechanically cleansed with sterile gauze and normal saline. Cloth tape  [de-identified] : None [de-identified] : Pressure [de-identified] : Erythema [de-identified] : None [de-identified] : None [de-identified] : No [de-identified] : 3x Weekly [de-identified] : Primary Dressing [TWNoteComboBox4] : None [TWNoteComboBox6] : Pressure [de-identified] : Erythema [de-identified] : None [de-identified] : None [de-identified] : No [de-identified] : 3x Weekly [de-identified] : Primary Dressing

## 2025-01-29 NOTE — PLAN
[FreeTextEntry1] : offload/frequent rotation/change of position triad 3x a wk or PRN f/u 2 wks  time spent 25 mins.

## 2025-02-02 DIAGNOSIS — E56.9 VITAMIN DEFICIENCY, UNSPECIFIED: ICD-10-CM

## 2025-02-02 DIAGNOSIS — Z87.891 PERSONAL HISTORY OF NICOTINE DEPENDENCE: ICD-10-CM

## 2025-02-02 DIAGNOSIS — M86.171 OTHER ACUTE OSTEOMYELITIS, RIGHT ANKLE AND FOOT: ICD-10-CM

## 2025-02-02 DIAGNOSIS — E87.6 HYPOKALEMIA: ICD-10-CM

## 2025-02-02 DIAGNOSIS — Z89.432 ACQUIRED ABSENCE OF LEFT FOOT: ICD-10-CM

## 2025-02-02 DIAGNOSIS — Z86.73 PERSONAL HISTORY OF TRANSIENT ISCHEMIC ATTACK (TIA), AND CEREBRAL INFARCTION WITHOUT RESIDUAL DEFICITS: ICD-10-CM

## 2025-02-02 DIAGNOSIS — Z99.3 DEPENDENCE ON WHEELCHAIR: ICD-10-CM

## 2025-02-02 DIAGNOSIS — M86.671 OTHER CHRONIC OSTEOMYELITIS, RIGHT ANKLE AND FOOT: ICD-10-CM

## 2025-02-02 DIAGNOSIS — K59.00 CONSTIPATION, UNSPECIFIED: ICD-10-CM

## 2025-02-02 DIAGNOSIS — L89.322 PRESSURE ULCER OF LEFT BUTTOCK, STAGE 2: ICD-10-CM

## 2025-02-02 DIAGNOSIS — D64.9 ANEMIA, UNSPECIFIED: ICD-10-CM

## 2025-02-02 DIAGNOSIS — Z79.84 LONG TERM (CURRENT) USE OF ORAL HYPOGLYCEMIC DRUGS: ICD-10-CM

## 2025-02-02 DIAGNOSIS — Z87.440 PERSONAL HISTORY OF URINARY (TRACT) INFECTIONS: ICD-10-CM

## 2025-02-02 DIAGNOSIS — E11.69 TYPE 2 DIABETES MELLITUS WITH OTHER SPECIFIED COMPLICATION: ICD-10-CM

## 2025-02-02 DIAGNOSIS — Z79.899 OTHER LONG TERM (CURRENT) DRUG THERAPY: ICD-10-CM

## 2025-02-02 DIAGNOSIS — I25.2 OLD MYOCARDIAL INFARCTION: ICD-10-CM

## 2025-02-02 DIAGNOSIS — L97.412 NON-PRESSURE CHRONIC ULCER OF RIGHT HEEL AND MIDFOOT WITH FAT LAYER EXPOSED: ICD-10-CM

## 2025-02-02 DIAGNOSIS — Z91.013 ALLERGY TO SEAFOOD: ICD-10-CM

## 2025-02-02 DIAGNOSIS — Z79.01 LONG TERM (CURRENT) USE OF ANTICOAGULANTS: ICD-10-CM

## 2025-02-02 NOTE — PHYSICAL EXAM
[4 x 4] : 4 x 4  [2 x 2] : 2 x 2  [Abdominal Pad] : Abdominal Pad [1+] : left 1+ [Ankle Swelling (On Exam)] : present [Ankle Swelling On The Left] : moderate [Varicose Veins Of Lower Extremities] : not present [] : not present [Skin Ulcer] : ulcer [Alert] : alert [Oriented to Person] : oriented to person [Oriented to Place] : oriented to place [Calm] : calm [de-identified] : calm [de-identified] : htn, hld, diabetic cardiovascular disease [de-identified] : s/p left TMA and Achilles tenotomy, pt has been non ambulatory since february  [de-identified] : left foot TMA - closed.  Right posterior heel wound of skin, subcutaneous tissue, fat   [de-identified] : IDDM with neuropathy  [FreeTextEntry2] : 5.5 [FreeTextEntry3] : 2.0 [FreeTextEntry4] : 0.1 with hypergranulation  [de-identified] : Serous/sanguinous [de-identified] : Vee  [de-identified] : Mechanically cleansed with sterile gauze and normal saline. Kerlix  [FreeTextEntry7] : Right dorsal foot - fragile epithelial tissue  [de-identified] : echymotic  [de-identified] : Dry dressing  [de-identified] : Left buttock - Fragile epithelial tissue - see Dr. Small note  [de-identified] : Mechanically cleansed with sterile gauze and normal saline. cloth tape  [FreeTextEntry1] : Right buttocks - fragile epthelial tissue - See Dr. Small note  [TWNoteComboBox4] : Moderate [de-identified] : Normal [de-identified] : None [de-identified] : None [de-identified] : 100% [de-identified] : No [de-identified] : Primary Dressing [de-identified] : 3x Weekly [de-identified] : None [de-identified] : Pressure [de-identified] : other [de-identified] : None [de-identified] : None [de-identified] : No [de-identified] : 3x Weekly [de-identified] : Secondary Dressing [de-identified] : False [de-identified] : False [de-identified] : False [de-identified] : False [de-identified] : False

## 2025-02-02 NOTE — ASSESSMENT
[Verbal] : Verbal [Written] : Written [Demo] : Demo [Patient] : Patient [Good - alert, interested, motivated] : Good - alert, interested, motivated [Verbalizes knowledge/Understanding] : Verbalizes knowledge/understanding [Dressing changes] : dressing changes [Skin Care] : skin care [Pressure relief] : pressure relief [Signs and symptoms of infection] : sign and symptoms of infection [Nutrition] : nutrition [How and When to Call] : how and when to call [Pain Management] : pain management [Off-loading] : off-loading [Patient responsibility to plan of care] : patient responsibility to plan of care [Stable] : stable [Other: ____] : [unfilled] [Wheelchair] : Wheelchair [Not Applicable - Long Term Care/Home Health Agency] : Long Term Care/Home Health Agency: Not Applicable [] : No [FreeTextEntry2] : Infection prevention Localized wound care  Goal remaining pain free regarding wounds Offloading  Pressure relief collagen matrix therapy     [FreeTextEntry4] : Instructions along with supply script provided to patient to bring to facility.  Education reinforced on the importance of heel offloading and to continue the use of heel booties.  Follow up in 1 week

## 2025-02-02 NOTE — REVIEW OF SYSTEMS
[Fever] : no fever [Chills] : no chills [Eye Pain] : no eye pain [Earache] : no earache [Loss Of Hearing] : no hearing loss [Chest Pain] : no chest pain [Shortness Of Breath] : no shortness of breath [Abdominal Pain] : no abdominal pain [Skin Wound] : skin wound [Limb Weakness] : limb weakness [Anxiety] : no anxiety [Easy Bleeding] : a tendency for easy bleeding [FreeTextEntry5] : htn, hld diabetic cardio vascular disease  [FreeTextEntry9] : s/p left foot TMA w/Achilles tenotomy [de-identified] : left foot DFU3, s/p TMA-  healed, right heel wound down to bone , s/p partial calcanectomy with (+) OM on path [de-identified] : diabetic neuropathy [de-identified] : IDDM with neuropathy

## 2025-02-02 NOTE — PHYSICAL EXAM
[4 x 4] : 4 x 4  [2 x 2] : 2 x 2  [Abdominal Pad] : Abdominal Pad [1+] : left 1+ [Ankle Swelling (On Exam)] : present [Ankle Swelling On The Left] : moderate [Varicose Veins Of Lower Extremities] : not present [] : not present [Skin Ulcer] : ulcer [Alert] : alert [Oriented to Person] : oriented to person [Oriented to Place] : oriented to place [Calm] : calm [de-identified] : calm [de-identified] : htn, hld, diabetic cardiovascular disease [de-identified] : s/p left TMA and Achilles tenotomy, pt has been non ambulatory since february  [de-identified] : left foot TMA - closed.  Right posterior heel wound of skin, subcutaneous tissue, fat   [de-identified] : IDDM with neuropathy  [FreeTextEntry2] : 5.5 [FreeTextEntry3] : 2.0 [FreeTextEntry4] : 0.1 with hypergranulation  [de-identified] : Serous/sanguinous [de-identified] : Vee  [de-identified] : Mechanically cleansed with sterile gauze and normal saline. Kerlix  [FreeTextEntry7] : Right dorsal foot - fragile epithelial tissue  [de-identified] : echymotic  [de-identified] : Dry dressing  [de-identified] : Mechanically cleansed with sterile gauze and normal saline. cloth tape  [de-identified] : Left buttock - Fragile epithelial tissue - see Dr. Small note  [FreeTextEntry1] : Right buttocks - fragile epthelial tissue - See Dr. Small note  [TWNoteComboBox4] : Moderate [de-identified] : Normal [de-identified] : None [de-identified] : None [de-identified] : 100% [de-identified] : No [de-identified] : Primary Dressing [de-identified] : 3x Weekly [de-identified] : None [de-identified] : Pressure [de-identified] : other [de-identified] : None [de-identified] : None [de-identified] : No [de-identified] : 3x Weekly [de-identified] : Secondary Dressing [de-identified] : False [de-identified] : False [de-identified] : False [de-identified] : False [de-identified] : False

## 2025-02-02 NOTE — REVIEW OF SYSTEMS
[Fever] : no fever [Chills] : no chills [Eye Pain] : no eye pain [Earache] : no earache [Loss Of Hearing] : no hearing loss [Chest Pain] : no chest pain [Shortness Of Breath] : no shortness of breath [Abdominal Pain] : no abdominal pain [Skin Wound] : skin wound [Limb Weakness] : limb weakness [Anxiety] : no anxiety [Easy Bleeding] : a tendency for easy bleeding [FreeTextEntry5] : htn, hld diabetic cardio vascular disease  [FreeTextEntry9] : s/p left foot TMA w/Achilles tenotomy [de-identified] : left foot DFU3, s/p TMA-  healed, right heel wound down to bone , s/p partial calcanectomy with (+) OM on path [de-identified] : diabetic neuropathy [de-identified] : IDDM with neuropathy

## 2025-02-02 NOTE — HISTORY OF PRESENT ILLNESS
[FreeTextEntry1] : Patient is 54 year old male presenting for f/u s/p right heel partial calcanectomy as on 09/10/24. Patient with (+) OM acute and chronic on pathology - noted with progress of healing. Patient is at rehab and has been wheel chair bound. Patient states has  been keeping the right heel offloaded with pillows.

## 2025-02-07 ENCOUNTER — APPOINTMENT (OUTPATIENT)
Dept: WOUND CARE | Facility: HOSPITAL | Age: 55
End: 2025-02-07

## 2025-02-07 ENCOUNTER — OUTPATIENT (OUTPATIENT)
Dept: OUTPATIENT SERVICES | Facility: HOSPITAL | Age: 55
LOS: 1 days | Discharge: HOME | End: 2025-02-07
Payer: MEDICAID

## 2025-02-07 VITALS
SYSTOLIC BLOOD PRESSURE: 149 MMHG | DIASTOLIC BLOOD PRESSURE: 81 MMHG | WEIGHT: 220 LBS | RESPIRATION RATE: 18 BRPM | TEMPERATURE: 98.1 F | BODY MASS INDEX: 28.23 KG/M2 | HEIGHT: 74 IN | HEART RATE: 67 BPM | OXYGEN SATURATION: 93 %

## 2025-02-07 DIAGNOSIS — L89.223 PRESSURE ULCER OF LEFT HIP, STAGE 3: ICD-10-CM

## 2025-02-07 DIAGNOSIS — L89.322 PRESSURE ULCER OF LEFT BUTTOCK, STAGE 2: ICD-10-CM

## 2025-02-07 DIAGNOSIS — L89.312 PRESSURE ULCER OF RIGHT BUTTOCK, STAGE 2: ICD-10-CM

## 2025-02-07 DIAGNOSIS — Z98.890 OTHER SPECIFIED POSTPROCEDURAL STATES: Chronic | ICD-10-CM

## 2025-02-07 DIAGNOSIS — K25.5 CHRONIC OR UNSPECIFIED GASTRIC ULCER WITH PERFORATION: Chronic | ICD-10-CM

## 2025-02-07 DIAGNOSIS — S98.912A COMPLETE TRAUMATIC AMPUTATION OF LEFT FOOT, LEVEL UNSPECIFIED, INITIAL ENCOUNTER: Chronic | ICD-10-CM

## 2025-02-07 PROCEDURE — G0463: CPT | Mod: 25

## 2025-02-07 PROCEDURE — 99213 OFFICE O/P EST LOW 20 MIN: CPT

## 2025-02-07 PROCEDURE — 17250 CHEM CAUT OF GRANLTJ TISSUE: CPT

## 2025-02-07 RX ORDER — HYDROPHILIC CREAM
PASTE (GRAM) TOPICAL
Qty: 60 | Refills: 6 | Status: ACTIVE | COMMUNITY
Start: 2025-02-07 | End: 1900-01-01

## 2025-02-07 NOTE — PHYSICAL EXAM
[4 x 4] : 4 x 4  [2 x 2] : 2 x 2  [Abdominal Pad] : Abdominal Pad [1+] : left 1+ [Ankle Swelling (On Exam)] : present [Ankle Swelling On The Left] : moderate [Varicose Veins Of Lower Extremities] : not present [] : not present [Skin Ulcer] : ulcer [Alert] : alert [Oriented to Person] : oriented to person [Oriented to Place] : oriented to place [Calm] : calm [de-identified] : calm [de-identified] : htn, hld, diabetic cardiovascular disease [de-identified] : s/p left TMA and Achilles tenotomy, pt has been non ambulatory since february  [de-identified] : left foot TMA - closed.  Right posterior heel wound of skin, subcutaneous tissue, fat   [de-identified] : IDDM with neuropathy  [FreeTextEntry2] : 4.0 [FreeTextEntry3] : 2.0 [FreeTextEntry4] : 0.1- Hypergranular  [de-identified] : Serous/sanguinous [de-identified] : Vee  [de-identified] : Mechanically cleansed with sterile gauze and normal saline. Kerlix   DPm silver nitrated the wound base, hypergranular tissue [FreeTextEntry7] : Right dorsal foot - fragile epithelial tissue  [de-identified] : echymotic  [de-identified] : Dry dressing  [de-identified] : Mechanically cleansed with sterile gauze and normal saline. cloth tape  [de-identified] : Left buttock - Fragile epithelial tissue - see Dr. Small note  [FreeTextEntry1] : Right buttocks - fragile epthelial tissue - See Dr. Small note  [TWNoteComboBox4] : Moderate [TWNoteComboBox5] : No [TWNoteComboBox6] : Pressure [de-identified] : No [de-identified] : Normal [de-identified] : None [de-identified] : None [de-identified] : 100% [de-identified] : No [de-identified] : 3x Weekly [de-identified] : Primary Dressing [de-identified] : None [de-identified] : Pressure [de-identified] : other [de-identified] : None [de-identified] : None [de-identified] : No [de-identified] : 3x Weekly [de-identified] : Secondary Dressing

## 2025-02-07 NOTE — REVIEW OF SYSTEMS
[Fever] : no fever [Chills] : no chills [Eye Pain] : no eye pain [Earache] : no earache [Loss Of Hearing] : no hearing loss [Chest Pain] : no chest pain [Shortness Of Breath] : no shortness of breath [Abdominal Pain] : no abdominal pain [Skin Wound] : skin wound [Limb Weakness] : limb weakness [Anxiety] : no anxiety [Easy Bleeding] : a tendency for easy bleeding [FreeTextEntry5] : htn, hld diabetic cardio vascular disease  [FreeTextEntry9] : s/p left foot TMA w/Achilles tenotomy [de-identified] : left foot DFU3, s/p TMA-  healed, right heel wound down to bone , s/p partial calcanectomy with (+) OM on path [de-identified] : diabetic neuropathy [de-identified] : IDDM with neuropathy

## 2025-02-07 NOTE — PHYSICAL EXAM
[Normal Thyroid] : the thyroid was normal [Normal Breath Sounds] : Normal breath sounds [Normal Heart Sounds] : normal heart sounds [Normal Rate and Rhythm] : normal rate and rhythm [Alert] : alert [Oriented to Person] : oriented to person [Oriented to Place] : oriented to place [Oriented to Time] : oriented to time [Calm] : calm [4 x 4] : 4 x 4  [Abdominal Pad] : Abdominal Pad [JVD] : no jugular venous distention  [Abdomen Masses] : No abdominal massess [Abdomen Tenderness] : ~T ~M No abdominal tenderness [Tender] : nontender [Enlarged] : not enlarged [de-identified] : adult WM, NAD, alert. Ox3 [de-identified] : left buttock - fragile epithelial tissue  [de-identified] : mixed etiology of pressure and exposure ot incontinence  [de-identified] : no S/S of infection, consistent with previous skin injury [de-identified] : Triad cream  [de-identified] : Mechanically cleansed with sterile gauze and normal saline. Cloth tape  [FreeTextEntry1] : Right buttocks - fragile epithelial tissue  [de-identified] : mixed etiology of pressure and exposure ot incontinence  [de-identified] : consistent with previous skin injury, no S/S of infection noted [de-identified] : Triad cream  [de-identified] : Mechanically cleansed with steri [FreeTextEntry7] : posterior lower leg [FreeTextEntry8] : 1.0 [FreeTextEntry9] : 1.0 [de-identified] : 0.1 [de-identified] : scant serous drainage [de-identified] : AG Allevyn foam [de-identified] :  Mechanically cleansed with sterile gauze and normal saline 0.9% Paper tape [de-identified] : None [de-identified] : No [de-identified] : Pressure [de-identified] : No [de-identified] : Erythema [de-identified] : None [de-identified] : None [de-identified] : None [de-identified] : No [de-identified] : Daily [TWNoteComboBox4] : None [TWNoteComboBox5] : No [TWNoteComboBox6] : Pressure [de-identified] : No [de-identified] : Erythema [de-identified] : None [de-identified] : None [de-identified] : None [de-identified] : No [de-identified] : Daily [TWNoteComboBox9] : Right [de-identified] : No [de-identified] : Pressure [de-identified] : No [de-identified] : Normal [de-identified] : None [de-identified] : None [de-identified] : 100% [de-identified] : No [de-identified] : 3x Weekly [de-identified] : Primary Dressing

## 2025-02-07 NOTE — PLAN
[FreeTextEntry1] : offload/frequent rotation/change of position triad cream QD  or PRN to Buttock allevyn Ag to right posterior calf f/u 2 wks  time spent 25 mins.

## 2025-02-07 NOTE — ASSESSMENT
[Verbal] : Verbal [Written] : Written [Demo] : Demo [Patient] : Patient [Good - alert, interested, motivated] : Good - alert, interested, motivated [Verbalizes knowledge/Understanding] : Verbalizes knowledge/understanding [Dressing changes] : dressing changes [Skin Care] : skin care [Pressure relief] : pressure relief [Signs and symptoms of infection] : sign and symptoms of infection [Nutrition] : nutrition [How and When to Call] : how and when to call [Pain Management] : pain management [] : Yes [Stable] : stable [Other: ____] : [unfilled] [Wheelchair] : Wheelchair [Not Applicable - Long Term Care/Home Health Agency] : Long Term Care/Home Health Agency: Not Applicable [FreeTextEntry2] : Infection prevention Localized wound care  Goal remaining pain free regarding wounds frequent turning and repositioning   Promote optimal skin care and nutrition. [FreeTextEntry4] : DOUBLE ENCOUNTER- NEXT VISIT ONLY HAS TO BE PODIATRY. MEDICAL ASSESSMENT EVERY 2 WEEKS Education reinforced on the importance of turning and repositioning at least every 2 hours to alleviate pressure to the affected area.  WC instructions provided to patient/caregiver Follow up in 2 weeks

## 2025-02-07 NOTE — ASSESSMENT
[Verbal] : Verbal [Written] : Written [Demo] : Demo [Patient] : Patient [Good - alert, interested, motivated] : Good - alert, interested, motivated [Verbalizes knowledge/Understanding] : Verbalizes knowledge/understanding [Dressing changes] : dressing changes [Skin Care] : skin care [Pressure relief] : pressure relief [Signs and symptoms of infection] : sign and symptoms of infection [Nutrition] : nutrition [How and When to Call] : how and when to call [Pain Management] : pain management [Off-loading] : off-loading [Patient responsibility to plan of care] : patient responsibility to plan of care [] : Yes [Stable] : stable [Other: ____] : [unfilled] [Wheelchair] : Wheelchair [Not Applicable - Long Term Care/Home Health Agency] : Long Term Care/Home Health Agency: Not Applicable [FreeTextEntry2] : Infection prevention Localized wound care  Goal remaining pain free regarding wounds Offloading  Pressure relief Collagen matrix therapy  Patient had skin substitutes in the past [FreeTextEntry4] : Instructions along with supply script provided to patient to bring to facility.  Education reinforced on the importance of heel offloading and to continue the use of heel booties.  Follow up in 1 week

## 2025-02-07 NOTE — VITALS
[Pain related to present condition?] : The patient's  pain is not related to present condition. [] : No [de-identified] : Patient has no pain related to wound. 0/10

## 2025-02-07 NOTE — HISTORY OF PRESENT ILLNESS
[FreeTextEntry1] : 53 yo WM, w/c bound, here for f/u of bilateral buttock pressure ulcers. Only scattered areas of excoriations on both side noted. No SOI. Reviewed importance of offload/frequent roation/change of position throughout the day/night. Here with his aide. 2/7/25 bilateral buttock much improved. No SOI  New area right posterior calf stage 1 ulcer. No SOI

## 2025-02-09 DIAGNOSIS — M86.171 OTHER ACUTE OSTEOMYELITIS, RIGHT ANKLE AND FOOT: ICD-10-CM

## 2025-02-09 DIAGNOSIS — Z87.440 PERSONAL HISTORY OF URINARY (TRACT) INFECTIONS: ICD-10-CM

## 2025-02-09 DIAGNOSIS — K59.00 CONSTIPATION, UNSPECIFIED: ICD-10-CM

## 2025-02-09 DIAGNOSIS — L89.322 PRESSURE ULCER OF LEFT BUTTOCK, STAGE 2: ICD-10-CM

## 2025-02-09 DIAGNOSIS — Z91.013 ALLERGY TO SEAFOOD: ICD-10-CM

## 2025-02-09 DIAGNOSIS — L89.891 PRESSURE ULCER OF OTHER SITE, STAGE 1: ICD-10-CM

## 2025-02-09 DIAGNOSIS — E56.9 VITAMIN DEFICIENCY, UNSPECIFIED: ICD-10-CM

## 2025-02-09 DIAGNOSIS — E11.69 TYPE 2 DIABETES MELLITUS WITH OTHER SPECIFIED COMPLICATION: ICD-10-CM

## 2025-02-09 DIAGNOSIS — E87.6 HYPOKALEMIA: ICD-10-CM

## 2025-02-09 DIAGNOSIS — Z79.899 OTHER LONG TERM (CURRENT) DRUG THERAPY: ICD-10-CM

## 2025-02-09 DIAGNOSIS — Z79.84 LONG TERM (CURRENT) USE OF ORAL HYPOGLYCEMIC DRUGS: ICD-10-CM

## 2025-02-09 DIAGNOSIS — Z89.432 ACQUIRED ABSENCE OF LEFT FOOT: ICD-10-CM

## 2025-02-09 DIAGNOSIS — Z86.73 PERSONAL HISTORY OF TRANSIENT ISCHEMIC ATTACK (TIA), AND CEREBRAL INFARCTION WITHOUT RESIDUAL DEFICITS: ICD-10-CM

## 2025-02-09 DIAGNOSIS — M86.671 OTHER CHRONIC OSTEOMYELITIS, RIGHT ANKLE AND FOOT: ICD-10-CM

## 2025-02-09 DIAGNOSIS — Z99.3 DEPENDENCE ON WHEELCHAIR: ICD-10-CM

## 2025-02-09 DIAGNOSIS — E11.40 TYPE 2 DIABETES MELLITUS WITH DIABETIC NEUROPATHY, UNSPECIFIED: ICD-10-CM

## 2025-02-09 DIAGNOSIS — L92.9 GRANULOMATOUS DISORDER OF THE SKIN AND SUBCUTANEOUS TISSUE, UNSPECIFIED: ICD-10-CM

## 2025-02-09 DIAGNOSIS — Z87.891 PERSONAL HISTORY OF NICOTINE DEPENDENCE: ICD-10-CM

## 2025-02-09 DIAGNOSIS — L97.412 NON-PRESSURE CHRONIC ULCER OF RIGHT HEEL AND MIDFOOT WITH FAT LAYER EXPOSED: ICD-10-CM

## 2025-02-09 DIAGNOSIS — I25.2 OLD MYOCARDIAL INFARCTION: ICD-10-CM

## 2025-02-09 DIAGNOSIS — Z79.01 LONG TERM (CURRENT) USE OF ANTICOAGULANTS: ICD-10-CM

## 2025-02-09 DIAGNOSIS — D64.9 ANEMIA, UNSPECIFIED: ICD-10-CM

## 2025-02-09 DIAGNOSIS — E11.59 TYPE 2 DIABETES MELLITUS WITH OTHER CIRCULATORY COMPLICATIONS: ICD-10-CM

## 2025-02-14 ENCOUNTER — APPOINTMENT (OUTPATIENT)
Dept: WOUND CARE | Facility: HOSPITAL | Age: 55
End: 2025-02-14
Payer: MEDICAID

## 2025-02-14 ENCOUNTER — OUTPATIENT (OUTPATIENT)
Dept: OUTPATIENT SERVICES | Facility: HOSPITAL | Age: 55
LOS: 1 days | Discharge: HOME | End: 2025-02-14
Payer: MEDICAID

## 2025-02-14 VITALS
BODY MASS INDEX: 28.23 KG/M2 | DIASTOLIC BLOOD PRESSURE: 76 MMHG | TEMPERATURE: 98.3 F | RESPIRATION RATE: 18 BRPM | WEIGHT: 220 LBS | OXYGEN SATURATION: 97 % | SYSTOLIC BLOOD PRESSURE: 125 MMHG | HEIGHT: 74 IN | HEART RATE: 54 BPM

## 2025-02-14 DIAGNOSIS — K25.5 CHRONIC OR UNSPECIFIED GASTRIC ULCER WITH PERFORATION: Chronic | ICD-10-CM

## 2025-02-14 DIAGNOSIS — S98.912A COMPLETE TRAUMATIC AMPUTATION OF LEFT FOOT, LEVEL UNSPECIFIED, INITIAL ENCOUNTER: Chronic | ICD-10-CM

## 2025-02-14 DIAGNOSIS — Z98.890 OTHER SPECIFIED POSTPROCEDURAL STATES: Chronic | ICD-10-CM

## 2025-02-14 DIAGNOSIS — E11.69 TYPE 2 DIABETES MELLITUS WITH OTHER SPECIFIED COMPLICATION: ICD-10-CM

## 2025-02-14 DIAGNOSIS — L97.412 NON-PRESSURE CHRONIC ULCER OF RIGHT HEEL AND MIDFOOT WITH FAT LAYER EXPOSED: ICD-10-CM

## 2025-02-14 DIAGNOSIS — L89.223 PRESSURE ULCER OF LEFT HIP, STAGE 3: ICD-10-CM

## 2025-02-14 DIAGNOSIS — M86.171 OTHER ACUTE OSTEOMYELITIS, RIGHT ANKLE AND FOOT: ICD-10-CM

## 2025-02-14 DIAGNOSIS — M86.671 OTHER CHRONIC OSTEOMYELITIS, RIGHT ANKLE AND FOOT: ICD-10-CM

## 2025-02-14 PROCEDURE — 99213 OFFICE O/P EST LOW 20 MIN: CPT

## 2025-02-14 PROCEDURE — G0463: CPT

## 2025-02-14 NOTE — ASSESSMENT
[Verbal] : Verbal [Written] : Written [Demo] : Demo [Patient] : Patient [Good - alert, interested, motivated] : Good - alert, interested, motivated [Verbalizes knowledge/Understanding] : Verbalizes knowledge/understanding [Dressing changes] : dressing changes [Skin Care] : skin care [Pressure relief] : pressure relief [Signs and symptoms of infection] : sign and symptoms of infection [Nutrition] : nutrition [How and When to Call] : how and when to call [Pain Management] : pain management [Off-loading] : off-loading [Patient responsibility to plan of care] : patient responsibility to plan of care [Stable] : stable [Other: ____] : [unfilled] [Wheelchair] : Wheelchair [Not Applicable - Long Term Care/Home Health Agency] : Long Term Care/Home Health Agency: Not Applicable [] : No [FreeTextEntry2] : Infection prevention Localized wound care  Goal remaining pain free regarding wounds Offloading  Pressure relief Collagen matrix therapy  Patient had skin substitutes in the past [FreeTextEntry4] : Instructions along with supply script provided to patient to bring to facility.  Education reinforced on the importance of heel offloading and to continue the use of heel booties.  Follow up in 1 week for assessment of the buttocks ulcer and heel ulcer [FreeTextEntry1] : Right heel ulcer is clean, healing, no acute infection.

## 2025-02-14 NOTE — PHYSICAL EXAM
[2 x 2] : 2 x 2  [Abdominal Pad] : Abdominal Pad [4 x 4] : 4 x 4  [Normal Breath Sounds] : Normal breath sounds [Normal Heart Sounds] : normal heart sounds [Alert] : alert [Oriented to Person] : oriented to person [Oriented to Place] : oriented to place [Oriented to Time] : oriented to time [Calm] : calm [de-identified] : 3x Weekly [de-identified] : Well-developed, Well-nourished in no acute distress. [de-identified] : Within normal limits. [de-identified] : Within normal limits. [de-identified] : Right heel ulcer is clean, base is red and viable, no drainage, no infection, periwound skin is intact with no cellulitis. [FreeTextEntry2] : 5.2 [FreeTextEntry3] : 2.2 [FreeTextEntry4] : 0.1 [de-identified] : Serous/sanguinous [de-identified] : macerated/edanna epithelial tissue at the proximal wound margin [de-identified] : red granulation tissue  [de-identified] : Vee  [de-identified] : Mechanically cleansed with sterile gauze and normal saline. Kerlix    [de-identified] : ecchymosis  [de-identified] : Mechanically cleansed with sterile gauze and normal saline. cloth tape  [de-identified] : Left buttock - Fragile epithelial tissue - see Dr. Small note  [FreeTextEntry1] : Right buttocks - fragile epthelial tissue - See Dr. Small note  [FreeTextEntry7] : Right Posterior Leg- closed  [de-identified] : none [TWNoteComboBox4] : Moderate [TWNoteComboBox5] : No [TWNoteComboBox6] : Pressure [de-identified] : No [de-identified] : other [de-identified] : None [de-identified] : None [de-identified] : 100% [de-identified] : No [de-identified] : 3x Weekly [de-identified] : Primary Dressing [de-identified] : None [de-identified] : None [de-identified] : No [de-identified] : 3x Weekly [de-identified] : Secondary Dressing [de-identified] : None [de-identified] : No [de-identified] : Pressure [de-identified] : No [de-identified] : Normal [de-identified] : Primary Dressing

## 2025-02-14 NOTE — PHYSICAL EXAM
[2 x 2] : 2 x 2  [Abdominal Pad] : Abdominal Pad [4 x 4] : 4 x 4  [Normal Breath Sounds] : Normal breath sounds [Normal Heart Sounds] : normal heart sounds [Alert] : alert [Oriented to Person] : oriented to person [Oriented to Place] : oriented to place [Oriented to Time] : oriented to time [Calm] : calm [de-identified] : 3x Weekly [de-identified] : Well-developed, Well-nourished in no acute distress. [de-identified] : Within normal limits. [de-identified] : Within normal limits. [de-identified] : Right heel ulcer is clean, base is red and viable, no drainage, no infection, periwound skin is intact with no cellulitis. [FreeTextEntry2] : 5.2 [FreeTextEntry3] : 2.2 [FreeTextEntry4] : 0.1 [de-identified] : Serous/sanguinous [de-identified] : macerated/deanna epithelial tissue at the proximal wound margin [de-identified] : red granulation tissue  [de-identified] : Vee  [de-identified] : Mechanically cleansed with sterile gauze and normal saline. Kerlix    [de-identified] : ecchymosis  [de-identified] : Mechanically cleansed with sterile gauze and normal saline. cloth tape  [de-identified] : Left buttock - Fragile epithelial tissue - see Dr. Small note  [FreeTextEntry1] : Right buttocks - fragile epthelial tissue - See Dr. Small note  [FreeTextEntry7] : Right Posterior Leg- closed  [de-identified] : none [TWNoteComboBox4] : Moderate [TWNoteComboBox5] : No [TWNoteComboBox6] : Pressure [de-identified] : No [de-identified] : other [de-identified] : None [de-identified] : None [de-identified] : 100% [de-identified] : No [de-identified] : 3x Weekly [de-identified] : Primary Dressing [de-identified] : None [de-identified] : None [de-identified] : No [de-identified] : 3x Weekly [de-identified] : Secondary Dressing [de-identified] : None [de-identified] : No [de-identified] : Pressure [de-identified] : No [de-identified] : Normal [de-identified] : Primary Dressing

## 2025-02-14 NOTE — PLAN
[FreeTextEntry1] : Vee, Dry dressing, return to office in one week. 25 minutes spent for patient care and medical decision making.

## 2025-02-17 DIAGNOSIS — Z99.3 DEPENDENCE ON WHEELCHAIR: ICD-10-CM

## 2025-02-17 DIAGNOSIS — E11.40 TYPE 2 DIABETES MELLITUS WITH DIABETIC NEUROPATHY, UNSPECIFIED: ICD-10-CM

## 2025-02-17 DIAGNOSIS — M86.171 OTHER ACUTE OSTEOMYELITIS, RIGHT ANKLE AND FOOT: ICD-10-CM

## 2025-02-17 DIAGNOSIS — Z79.01 LONG TERM (CURRENT) USE OF ANTICOAGULANTS: ICD-10-CM

## 2025-02-17 DIAGNOSIS — Z87.891 PERSONAL HISTORY OF NICOTINE DEPENDENCE: ICD-10-CM

## 2025-02-17 DIAGNOSIS — I25.2 OLD MYOCARDIAL INFARCTION: ICD-10-CM

## 2025-02-17 DIAGNOSIS — L89.322 PRESSURE ULCER OF LEFT BUTTOCK, STAGE 2: ICD-10-CM

## 2025-02-17 DIAGNOSIS — E87.6 HYPOKALEMIA: ICD-10-CM

## 2025-02-17 DIAGNOSIS — E11.59 TYPE 2 DIABETES MELLITUS WITH OTHER CIRCULATORY COMPLICATIONS: ICD-10-CM

## 2025-02-17 DIAGNOSIS — Z86.73 PERSONAL HISTORY OF TRANSIENT ISCHEMIC ATTACK (TIA), AND CEREBRAL INFARCTION WITHOUT RESIDUAL DEFICITS: ICD-10-CM

## 2025-02-17 DIAGNOSIS — Z87.440 PERSONAL HISTORY OF URINARY (TRACT) INFECTIONS: ICD-10-CM

## 2025-02-17 DIAGNOSIS — E56.9 VITAMIN DEFICIENCY, UNSPECIFIED: ICD-10-CM

## 2025-02-17 DIAGNOSIS — D64.9 ANEMIA, UNSPECIFIED: ICD-10-CM

## 2025-02-17 DIAGNOSIS — L89.312 PRESSURE ULCER OF RIGHT BUTTOCK, STAGE 2: ICD-10-CM

## 2025-02-17 DIAGNOSIS — Z79.84 LONG TERM (CURRENT) USE OF ORAL HYPOGLYCEMIC DRUGS: ICD-10-CM

## 2025-02-17 DIAGNOSIS — L89.891 PRESSURE ULCER OF OTHER SITE, STAGE 1: ICD-10-CM

## 2025-02-17 DIAGNOSIS — Z79.899 OTHER LONG TERM (CURRENT) DRUG THERAPY: ICD-10-CM

## 2025-02-17 DIAGNOSIS — M86.671 OTHER CHRONIC OSTEOMYELITIS, RIGHT ANKLE AND FOOT: ICD-10-CM

## 2025-02-17 DIAGNOSIS — K59.00 CONSTIPATION, UNSPECIFIED: ICD-10-CM

## 2025-02-17 DIAGNOSIS — L97.412 NON-PRESSURE CHRONIC ULCER OF RIGHT HEEL AND MIDFOOT WITH FAT LAYER EXPOSED: ICD-10-CM

## 2025-02-17 DIAGNOSIS — Z89.432 ACQUIRED ABSENCE OF LEFT FOOT: ICD-10-CM

## 2025-02-17 DIAGNOSIS — E11.69 TYPE 2 DIABETES MELLITUS WITH OTHER SPECIFIED COMPLICATION: ICD-10-CM

## 2025-02-19 ENCOUNTER — APPOINTMENT (OUTPATIENT)
Dept: WOUND CARE | Facility: HOSPITAL | Age: 55
End: 2025-02-19

## 2025-02-19 NOTE — SWALLOW BEDSIDE ASSESSMENT ADULT - SLP PATIENT PROFILE REVIEW
Mode of Visit: Video  Location of patient: -HOME-  Location of provider: +[facility]+  You have chosen to receive care through a telehealth visit.  The patient has signed the video visit consent form.  The visit included audio and video interaction. No technical issues occurred during this visit.    Chief Complaint  Hyperlipidemia, Peripheral Neuropathy, Migraine, Hypertension, Benign Prostatic Hypertrophy, Depression, Multiple Sclerosis, and Erectile Dysfunction (/)    Subjective            Jai Dow presents to [facility]  History of Present Illness  Pt is a f/u for HLD, Neuropathy, migraines, HTN, BPH, depression, MS, and ED. No issues or concerns at this time. He is interested in a wt loss medication, not an injectable but he is going to discuss the best option with his neurologist first and will make an appt to discuss.    Pt is due lipid panel/orders placed.    Pt is followed by Dr. Anderson with urology.    Pt is followed by Dr. Vela with neurology.    [facility]         Past Medical History:   Diagnosis Date    Acoustic neuroma 08/08/2023    Benign prostatic hyperplasia with lower urinary tract symptoms 10/29/2024    Cluster headache 2003    [facility]    Difficulty walking 2018    Knee replacements    Dizziness 1990 and 2003    Deployments to [place] and [place]    Erectile dysfunction June 2022    Taking Sildenafil    Head injury 2001    In the [employer] in the back of a track vehicle    Headache, tension-type 2003    [facility]    Hypertension 2006    Memory loss 2001    Increasing, memory loss Family members names, co-workers, recent projects    Migraine     Migraine     Multiple sclerosis 12/22/2022    PTSD (post-traumatic stress disorder)     Sleep apnea     Stop using the machine/felt like I couldn’t breath  during nightmares    Tinnitus     1990 and 2003    Urinary incontinence 2023    Taking  Oxybutynin    Vision loss 2022    Blurred       Allergies  yes   Allergen Reactions    Amoxil [Amoxicillin] Angioedema        Past Surgical History:   Procedure Laterality Date    COLONOSCOPY  2015    COLONOSCOPY N/A 2023    Procedure: COLONOSCOPY;  Surgeon: Alexis Bazzi MD;  Location: formerly [facility] ENDOSCOPY;  Service: Gastroenterology;  Laterality: N/A;  HEMORRHOIDS    KNEE ACL RECONSTRUCTION      MENISCECTOMY      REPLACEMENT TOTAL KNEE Bilateral     VASECTOMY  2004        Social History     Tobacco Use    Smoking status: Former     Current packs/day: 0.00     Average packs/day: 0.5 packs/day for 10.2 years (5.1 ttl pk-yrs)     Types: Cigarettes     Start date: 3/31/1980     Quit date: 6/10/1990     Years since quittin.7     Passive exposure: Past    Smokeless tobacco: Never   Substance Use Topics    Alcohol use: Not Currently     Alcohol/week: 5.0 - 9.0 standard drinks of alcohol     Types: 1 - 2 Cans of beer, 4 - 7 Shots of liquor per week     Comment: Drank everyday after the [place] War,  [place] and [place]       Family History   Problem Relation Age of Onset    Dementia Mother     Cancer Mother         Breast Cancer    Stroke Father     Hypertension Father     Osteoarthritis Father     Stroke Brother     Migraines Brother         Current Outpatient Medications on File Prior to Visit   Medication Sig    amantadine (SYMMETREL) 100 MG capsule Take 1 capsule by mouth 2 (Two) Times a Day.    atorvastatin (LIPITOR) 10 MG tablet Take 1 tablet by mouth Daily.    clobetasol (TEMOVATE) 0.05 % ointment     cyclobenzaprine (FLEXERIL) 10 MG tablet Take 1 tablet by mouth 3 (Three) Times a Day As Needed.    Dalfampridine ER 10 MG tablet sustained-release 12 hour Take 10 mg by mouth 2 (Two) Times a Day.    donepezil (ARICEPT) 23 MG tablet Take 1 tablet by mouth 2 (Two) Times a Day.    Dupilumab (Dupixent) 300 MG/2ML solution auto-injector injection Inject 2 mL under the skin into the appropriate area as directed Every 14 (Fourteen) Days.    Emollient (AQUAPHOR OINTMENT  BODY EX) Apply  topically.    Fremanezumab-vfrm (Ajovy) 225 MG/1.5ML solution auto-injector Inject 225 mg under the skin into the appropriate area as directed Every 28 (Twenty-Eight) Days.    gabapentin (NEURONTIN) 300 MG capsule Take 1 capsule by mouth.    hydroCHLOROthiazide 25 MG tablet Take 1 tablet by mouth Daily.    hydrocortisone 2.5 % cream     lisinopril (PRINIVIL,ZESTRIL) 40 MG tablet Take 1 tablet by mouth Daily.    Magnesium 125 MG capsule     Mirabegron ER (Myrbetriq) 50 MG tablet sustained-release 24 hour 24 hr tablet Take 50 mg by mouth Daily.    mupirocin (BACTROBAN) 2 % ointment Apply  topically to the appropriate area as directed 3 (Three) Times a Day. apply topically to the affected area three times daily    naproxen sodium (ANAPROX) 275 MG tablet Take 1 tablet by mouth 2 (Two) Times a Day With Meals.    Ocrelizumab (OCREVUS IV)     prazosin (MINIPRESS) 2 MG capsule Take 1 capsule by mouth Daily As Needed.    pregabalin (Lyrica) 150 MG capsule Take 1 capsule by mouth 2 (Two) Times a Day.    Rimegepant Sulfate (Nurtec) 75 MG tablet dispersible tablet Take 1 tablet by mouth Take As Directed.    Sennosides 8.6 MG capsule Take 8.6 mL by mouth Every Night.    sertraline (ZOLOFT) 100 MG tablet 2 tablets.    sildenafil (Viagra) 25 MG tablet Take 1 tablet by mouth Daily As Needed for Erectile Dysfunction.    tamsulosin (FLOMAX) 0.4 MG capsule 24 hr capsule Take 1 capsule by mouth Daily.    urea (CARMOL) 20 % cream Apply 1 Application topically to the appropriate area as directed As Needed for Dry Skin.    [DISCONTINUED] amitriptyline (ELAVIL) 25 MG tablet Take 2 tablets by mouth Every Night. (Patient not taking: Reported on 2/19/2025)    [DISCONTINUED] hydrOXYzine (ATARAX) 10 MG tablet Take 1 tablet by mouth Every Night.    [DISCONTINUED] Omega-3 Fatty Acids (fish oil) 1000 MG capsule capsule Take 1 capsule by mouth Daily With Breakfast. (Patient not taking: Reported on 2/19/2025)    [DISCONTINUED]  sulfamethoxazole-trimethoprim (Bactrim DS) 800-160 MG per tablet 1 tab by mouth twice a day until gone starting the day before urology procedure (Patient not taking: Reported on 10/29/2024)     Current Facility-Administered Medications on File Prior to Visit   Medication    Fremanezumab-vfrm solution prefilled syringe 225 mg       Health Maintenance Due   Topic Date Due    LIPID PANEL  03/05/2025       Objective     There were no vitals taken for this visit.      Physical Exam  Constitutional:       Appearance: Normal appearance.   Neurological:      Mental Status: He is alert.   Psychiatric:         Attention and Perception: Attention normal.         Mood and Affect: Mood normal.         Speech: Speech normal.         Behavior: Behavior normal.         Thought Content: Thought content normal.         Judgment: Judgment normal.           Result Review :                           Assessment and Plan        Diagnoses and all orders for this visit:    1. Hyperlipidemia, unspecified hyperlipidemia type (Primary)  Comments:  stable on Lipitor 10mg, continue    2. Migraine without status migrainosus, not intractable, unspecified migraine type  Comments:  stable on Ajovy q month and nurtic 75mg prn, continue    3. Primary hypertension  Comments:  stable on Lisinopril 40mg, continue    4. Major depressive disorder, recurrent, mild  Comments:  stable on zoloft 200mg, continue    5. Anxiety  Comments:  stable on zoloft 200mg and minipress 2mg prn, continue    6. Erectile dysfunction, unspecified erectile dysfunction type  Comments:  stable on viagra 25mg prn, continue    7. Neuropathy  Comments:  stable on Lyrica 150mg, continue    8. MS (multiple sclerosis)  Comments:  continue f/u with Neurology for mgmt              Follow Up     Return in about 6 months (around 8/19/2025).    Patient was given instructions and counseling regarding his condition or for health maintenance advice. Please see specific information pulled into  the AVS if appropriate.     Jai Dow  reports that he quit smoking about 34 years ago. His smoking use included cigarettes. He started smoking about 44 years ago. He has a 5.1 pack-year smoking history. He has been exposed to tobacco smoke. He has never used smokeless tobacco. I

## 2025-02-21 ENCOUNTER — APPOINTMENT (OUTPATIENT)
Dept: WOUND CARE | Facility: HOSPITAL | Age: 55
End: 2025-02-21
Payer: MEDICAID

## 2025-02-21 ENCOUNTER — OUTPATIENT (OUTPATIENT)
Dept: OUTPATIENT SERVICES | Facility: HOSPITAL | Age: 55
LOS: 1 days | Discharge: HOME | End: 2025-02-21
Payer: MEDICAID

## 2025-02-21 VITALS
OXYGEN SATURATION: 95 % | TEMPERATURE: 98.9 F | HEART RATE: 67 BPM | HEIGHT: 74 IN | BODY MASS INDEX: 28.23 KG/M2 | RESPIRATION RATE: 18 BRPM | WEIGHT: 220 LBS | DIASTOLIC BLOOD PRESSURE: 88 MMHG | SYSTOLIC BLOOD PRESSURE: 150 MMHG

## 2025-02-21 DIAGNOSIS — E11.69 TYPE 2 DIABETES MELLITUS WITH OTHER SPECIFIED COMPLICATION: ICD-10-CM

## 2025-02-21 DIAGNOSIS — M86.171 OTHER ACUTE OSTEOMYELITIS, RIGHT ANKLE AND FOOT: ICD-10-CM

## 2025-02-21 DIAGNOSIS — K25.5 CHRONIC OR UNSPECIFIED GASTRIC ULCER WITH PERFORATION: Chronic | ICD-10-CM

## 2025-02-21 DIAGNOSIS — L89.600 PRESSURE ULCER OF UNSPECIFIED HEEL, UNSTAGEABLE: ICD-10-CM

## 2025-02-21 DIAGNOSIS — M86.671 OTHER CHRONIC OSTEOMYELITIS, RIGHT ANKLE AND FOOT: ICD-10-CM

## 2025-02-21 DIAGNOSIS — L89.322 PRESSURE ULCER OF LEFT BUTTOCK, STAGE 2: ICD-10-CM

## 2025-02-21 DIAGNOSIS — L89.312 PRESSURE ULCER OF RIGHT BUTTOCK, STAGE 2: ICD-10-CM

## 2025-02-21 DIAGNOSIS — Z98.890 OTHER SPECIFIED POSTPROCEDURAL STATES: Chronic | ICD-10-CM

## 2025-02-21 DIAGNOSIS — S98.912A COMPLETE TRAUMATIC AMPUTATION OF LEFT FOOT, LEVEL UNSPECIFIED, INITIAL ENCOUNTER: Chronic | ICD-10-CM

## 2025-02-21 DIAGNOSIS — L89.223 PRESSURE ULCER OF LEFT HIP, STAGE 3: ICD-10-CM

## 2025-02-21 DIAGNOSIS — L97.412 NON-PRESSURE CHRONIC ULCER OF RIGHT HEEL AND MIDFOOT WITH FAT LAYER EXPOSED: ICD-10-CM

## 2025-02-21 PROCEDURE — G0463: CPT

## 2025-02-21 PROCEDURE — 99213 OFFICE O/P EST LOW 20 MIN: CPT

## 2025-02-21 NOTE — VITALS
[Pain related to present condition?] : The patient's  pain is not related to present condition. [] : No [de-identified] : 0/10

## 2025-02-21 NOTE — PHYSICAL EXAM
[Normal Thyroid] : the thyroid was normal [Normal Breath Sounds] : Normal breath sounds [Normal Heart Sounds] : normal heart sounds [Normal Rate and Rhythm] : normal rate and rhythm [Alert] : alert [Oriented to Person] : oriented to person [Oriented to Place] : oriented to place [Oriented to Time] : oriented to time [Calm] : calm [JVD] : no jugular venous distention  [Abdomen Masses] : No abdominal massess [Abdomen Tenderness] : ~T ~M No abdominal tenderness [Tender] : nontender [Enlarged] : not enlarged [de-identified] : adult WM, NAD, alert. Ox3 [4 x 4] : 4 x 4  [Abdominal Pad] : Abdominal Pad [de-identified] : left buttock - fragile epithelial tissue  [de-identified] : mixed etiology of pressure and exposure ot incontinence  [de-identified] : no S/S of infection, consistent with previous skin injury [de-identified] : Triad cream  [de-identified] : Mechanically cleansed with sterile gauze and normal saline. Cloth tape  [FreeTextEntry1] : Right buttocks - fragile epithelial tissue  [de-identified] : mixed etiology of pressure and exposure ot incontinence  [de-identified] : consistent with previous skin injury, no S/S of infection noted [de-identified] : Triad cream  [de-identified] : Mechanically cleansed with steri [FreeTextEntry7] : posterior lower leg- closed [de-identified] : None [de-identified] : No [de-identified] : Pressure [de-identified] : No [de-identified] : Erythema [de-identified] : None [de-identified] : None [de-identified] : No [de-identified] : None [de-identified] : Daily [TWNoteComboBox4] : None [TWNoteComboBox5] : No [TWNoteComboBox6] : Pressure [de-identified] : No [de-identified] : Erythema [de-identified] : None [de-identified] : None [de-identified] : None [de-identified] : No [de-identified] : Daily [TWNoteComboBox9] : Right [de-identified] : No [de-identified] : Pressure [de-identified] : No [de-identified] : Normal [de-identified] : None [de-identified] : None [de-identified] : None [de-identified] : No [de-identified] : 3x Weekly

## 2025-02-21 NOTE — HISTORY OF PRESENT ILLNESS
[FreeTextEntry1] : 53 yo WM, w/c bound, here for f/u of bilateral buttock pressure ulcers. Only scattered areas of excoriations on both side noted. No SOI. Reviewed importance of offload/frequent roation/change of position throughout the day/night. Here with his aide. 2/7/25 bilateral buttock much improved. No SOI  New area right posterior calf stage 1 ulcer. No SOI 2/21/25 bilateral buttock stage 2 stable. C/O irritation noted. No SOI

## 2025-02-21 NOTE — PLAN
[FreeTextEntry1] : offload/frequent rotation/change of position triad cream QD  or PRN to Buttock f/u 2 wks  time spent 25 mins.

## 2025-02-21 NOTE — ASSESSMENT
[Verbal] : Verbal [Written] : Written [Demo] : Demo [Patient] : Patient [Good - alert, interested, motivated] : Good - alert, interested, motivated [Verbalizes knowledge/Understanding] : Verbalizes knowledge/understanding [Dressing changes] : dressing changes [Skin Care] : skin care [Pressure relief] : pressure relief [Signs and symptoms of infection] : sign and symptoms of infection [Nutrition] : nutrition [How and When to Call] : how and when to call [Pain Management] : pain management [] : Yes [FreeTextEntry2] : Infection prevention Localized wound care  Goal remaining pain free regarding wounds frequent turning and repositioning   Promote optimal skin care and nutrition. [FreeTextEntry4] : DOUBLE ENCOUNTER- NEXT VISIT ONLY HAS TO BE PODIATRY. MEDICAL ASSESSMENT EVERY 2 WEEKS Education reinforced on the importance of turning and repositioning at least every 2 hours to alleviate pressure to the affected area.  WC instructions provided to patient/caregiver Follow up in 2 weeks  [Stable] : stable [Other: ____] : [unfilled] [Wheelchair] : Wheelchair [Not Applicable - Long Term Care/Home Health Agency] : Long Term Care/Home Health Agency: Not Applicable

## 2025-02-24 DIAGNOSIS — Z79.899 OTHER LONG TERM (CURRENT) DRUG THERAPY: ICD-10-CM

## 2025-02-24 DIAGNOSIS — E11.59 TYPE 2 DIABETES MELLITUS WITH OTHER CIRCULATORY COMPLICATIONS: ICD-10-CM

## 2025-02-24 DIAGNOSIS — D64.9 ANEMIA, UNSPECIFIED: ICD-10-CM

## 2025-02-24 DIAGNOSIS — Z79.84 LONG TERM (CURRENT) USE OF ORAL HYPOGLYCEMIC DRUGS: ICD-10-CM

## 2025-02-24 DIAGNOSIS — E87.6 HYPOKALEMIA: ICD-10-CM

## 2025-02-24 DIAGNOSIS — I25.2 OLD MYOCARDIAL INFARCTION: ICD-10-CM

## 2025-02-24 DIAGNOSIS — Z89.432 ACQUIRED ABSENCE OF LEFT FOOT: ICD-10-CM

## 2025-02-24 DIAGNOSIS — Z79.01 LONG TERM (CURRENT) USE OF ANTICOAGULANTS: ICD-10-CM

## 2025-02-24 DIAGNOSIS — E11.40 TYPE 2 DIABETES MELLITUS WITH DIABETIC NEUROPATHY, UNSPECIFIED: ICD-10-CM

## 2025-02-24 DIAGNOSIS — L89.322 PRESSURE ULCER OF LEFT BUTTOCK, STAGE 2: ICD-10-CM

## 2025-02-24 DIAGNOSIS — E11.69 TYPE 2 DIABETES MELLITUS WITH OTHER SPECIFIED COMPLICATION: ICD-10-CM

## 2025-02-24 DIAGNOSIS — Z86.73 PERSONAL HISTORY OF TRANSIENT ISCHEMIC ATTACK (TIA), AND CEREBRAL INFARCTION WITHOUT RESIDUAL DEFICITS: ICD-10-CM

## 2025-02-24 DIAGNOSIS — Z87.440 PERSONAL HISTORY OF URINARY (TRACT) INFECTIONS: ICD-10-CM

## 2025-02-24 DIAGNOSIS — E56.9 VITAMIN DEFICIENCY, UNSPECIFIED: ICD-10-CM

## 2025-02-24 DIAGNOSIS — Z87.891 PERSONAL HISTORY OF NICOTINE DEPENDENCE: ICD-10-CM

## 2025-02-24 DIAGNOSIS — L97.412 NON-PRESSURE CHRONIC ULCER OF RIGHT HEEL AND MIDFOOT WITH FAT LAYER EXPOSED: ICD-10-CM

## 2025-02-24 DIAGNOSIS — M86.671 OTHER CHRONIC OSTEOMYELITIS, RIGHT ANKLE AND FOOT: ICD-10-CM

## 2025-02-24 DIAGNOSIS — M86.171 OTHER ACUTE OSTEOMYELITIS, RIGHT ANKLE AND FOOT: ICD-10-CM

## 2025-02-24 DIAGNOSIS — K59.00 CONSTIPATION, UNSPECIFIED: ICD-10-CM

## 2025-02-24 NOTE — VITALS
[Pain related to present condition?] : The patient's  pain is not related to present condition. [] : No [de-identified] : 0/10

## 2025-02-24 NOTE — HISTORY OF PRESENT ILLNESS
Pt refused Covid test. Provider aware.   [FreeTextEntry1] : Patient is 54 year old male presenting for f/u for right heel ulcer down to fat. Patient is in rehab and is non ambulatory and is in wheel chair.

## 2025-02-24 NOTE — HISTORY OF PRESENT ILLNESS
[FreeTextEntry1] : Patient is 54 year old male presenting for f/u for right heel ulcer down to fat. Patient is in rehab and is non ambulatory and is in wheel chair.

## 2025-02-24 NOTE — VITALS
[Pain related to present condition?] : The patient's  pain is not related to present condition. [] : No [de-identified] : 0/10

## 2025-02-24 NOTE — PHYSICAL EXAM
[4 x 4] : 4 x 4  [2 x 2] : 2 x 2  [Abdominal Pad] : Abdominal Pad [Normal Breath Sounds] : Normal breath sounds [Normal Heart Sounds] : normal heart sounds [Alert] : alert [Oriented to Person] : oriented to person [Oriented to Place] : oriented to place [Oriented to Time] : oriented to time [Calm] : calm [de-identified] : Well-developed, Well-nourished in no acute distress. [de-identified] : Within normal limits. [de-identified] : Within normal limits. [de-identified] : Right heel ulcer is  with granular wound base, mild serous  drainage, no infection, periwound skin is intact with no cellulitis. [FreeTextEntry2] : 4.0 [FreeTextEntry3] : 1.9 [FreeTextEntry4] : 0.1 [de-identified] : Serous/sanguinous [de-identified] : macerated/deanna epithelial tissue at the proximal wound margin [de-identified] : red granulation tissue  [de-identified] : Vee  [de-identified] : Mechanically cleansed with sterile gauze and normal saline. Kerlix   Right lateral foot with areas of erythema and dry/scaly skin- DPM advised to apply LOTRISONE To this area with dressing changes  [de-identified] : ecchymosis  [de-identified] : Mechanically cleansed with sterile gauze and normal saline. cloth tape  [de-identified] : Left buttock - Fragile epithelial tissue - see Dr. Small note  [FreeTextEntry1] : Right buttocks - fragile epthelial tissue - See Dr. Small note  [FreeTextEntry7] : Right Posterior Leg- closed  [de-identified] : none [TWNoteComboBox4] : Small [TWNoteComboBox5] : No [TWNoteComboBox6] : Pressure [de-identified] : No [de-identified] : other [de-identified] : None [de-identified] : None [de-identified] : 100% [de-identified] : No [de-identified] : 3x Weekly [de-identified] : Primary Dressing [de-identified] : None [de-identified] : None [de-identified] : No [de-identified] : 3x Weekly [de-identified] : Secondary Dressing [de-identified] : None [de-identified] : No [de-identified] : Pressure [de-identified] : No [de-identified] : Normal

## 2025-02-24 NOTE — PLAN
[FreeTextEntry1] : .Patient seen and evaluated. Discussed etiology and treatment plan. Patient verbalized understanding. C/w local wound care applying Vee, Dry dressing, return to office in one week. Spent 20 minutes for patient care and medical decision making.

## 2025-02-24 NOTE — PHYSICAL EXAM
[4 x 4] : 4 x 4  [2 x 2] : 2 x 2  [Abdominal Pad] : Abdominal Pad [Normal Breath Sounds] : Normal breath sounds [Normal Heart Sounds] : normal heart sounds [Alert] : alert [Oriented to Person] : oriented to person [Oriented to Place] : oriented to place [Oriented to Time] : oriented to time [Calm] : calm [de-identified] : Well-developed, Well-nourished in no acute distress. [de-identified] : Within normal limits. [de-identified] : Within normal limits. [de-identified] : Right heel ulcer is  with granular wound base, mild serous  drainage, no infection, periwound skin is intact with no cellulitis. [FreeTextEntry2] : 4.0 [FreeTextEntry3] : 1.9 [FreeTextEntry4] : 0.1 [de-identified] : Serous/sanguinous [de-identified] : macerated/deanna epithelial tissue at the proximal wound margin [de-identified] : red granulation tissue  [de-identified] : Vee  [de-identified] : Mechanically cleansed with sterile gauze and normal saline. Kerlix   Right lateral foot with areas of erythema and dry/scaly skin- DPM advised to apply LOTRISONE To this area with dressing changes  [de-identified] : ecchymosis  [de-identified] : Mechanically cleansed with sterile gauze and normal saline. cloth tape  [de-identified] : Left buttock - Fragile epithelial tissue - see Dr. Small note  [FreeTextEntry1] : Right buttocks - fragile epthelial tissue - See Dr. Small note  [FreeTextEntry7] : Right Posterior Leg- closed  [de-identified] : none [TWNoteComboBox4] : Small [TWNoteComboBox5] : No [TWNoteComboBox6] : Pressure [de-identified] : No [de-identified] : other [de-identified] : None [de-identified] : None [de-identified] : 100% [de-identified] : No [de-identified] : 3x Weekly [de-identified] : Primary Dressing [de-identified] : None [de-identified] : None [de-identified] : No [de-identified] : 3x Weekly [de-identified] : Secondary Dressing [de-identified] : None [de-identified] : No [de-identified] : Pressure [de-identified] : No [de-identified] : Normal

## 2025-02-24 NOTE — ASSESSMENT
[Verbal] : Verbal [Written] : Written [Demo] : Demo [Patient] : Patient [Good - alert, interested, motivated] : Good - alert, interested, motivated [Verbalizes knowledge/Understanding] : Verbalizes knowledge/understanding [Dressing changes] : dressing changes [Skin Care] : skin care [Pressure relief] : pressure relief [Signs and symptoms of infection] : sign and symptoms of infection [Nutrition] : nutrition [How and When to Call] : how and when to call [Pain Management] : pain management [Off-loading] : off-loading [Patient responsibility to plan of care] : patient responsibility to plan of care [] : Yes [Stable] : stable [Other: ____] : [unfilled] [Wheelchair] : Wheelchair [Not Applicable - Long Term Care/Home Health Agency] : Long Term Care/Home Health Agency: Not Applicable [FreeTextEntry2] : Infection prevention Localized wound care  Goal remaining pain free regarding wounds Offloading  Pressure relief Collagen matrix therapy  Patient had skin substitutes in the past [FreeTextEntry4] : -Instructions along with supply script provided to patient to bring to facility.  -Education reinforced on the importance of heel offloading and to continue the use of heel booties.  -Follow up in 1 week for assessment of the heel and 2 week assessment for buttock (approximately March 7th)

## 2025-03-05 ENCOUNTER — APPOINTMENT (OUTPATIENT)
Dept: WOUND CARE | Facility: HOSPITAL | Age: 55
End: 2025-03-05
Payer: MEDICAID

## 2025-03-05 ENCOUNTER — OUTPATIENT (OUTPATIENT)
Dept: OUTPATIENT SERVICES | Facility: HOSPITAL | Age: 55
LOS: 1 days | Discharge: HOME | End: 2025-03-05
Payer: MEDICAID

## 2025-03-05 VITALS
RESPIRATION RATE: 15 BRPM | TEMPERATURE: 98.1 F | DIASTOLIC BLOOD PRESSURE: 75 MMHG | WEIGHT: 220 LBS | HEART RATE: 66 BPM | BODY MASS INDEX: 28.23 KG/M2 | HEIGHT: 74 IN | OXYGEN SATURATION: 94 % | SYSTOLIC BLOOD PRESSURE: 128 MMHG

## 2025-03-05 DIAGNOSIS — Z87.440 PERSONAL HISTORY OF URINARY (TRACT) INFECTIONS: ICD-10-CM

## 2025-03-05 DIAGNOSIS — Z98.890 OTHER SPECIFIED POSTPROCEDURAL STATES: Chronic | ICD-10-CM

## 2025-03-05 DIAGNOSIS — L89.312 PRESSURE ULCER OF RIGHT BUTTOCK, STAGE 2: ICD-10-CM

## 2025-03-05 DIAGNOSIS — M86.171 OTHER ACUTE OSTEOMYELITIS, RIGHT ANKLE AND FOOT: ICD-10-CM

## 2025-03-05 DIAGNOSIS — E11.40 TYPE 2 DIABETES MELLITUS WITH DIABETIC NEUROPATHY, UNSPECIFIED: ICD-10-CM

## 2025-03-05 DIAGNOSIS — E11.59 TYPE 2 DIABETES MELLITUS WITH OTHER CIRCULATORY COMPLICATIONS: ICD-10-CM

## 2025-03-05 DIAGNOSIS — Z79.899 OTHER LONG TERM (CURRENT) DRUG THERAPY: ICD-10-CM

## 2025-03-05 DIAGNOSIS — L97.412 NON-PRESSURE CHRONIC ULCER OF RIGHT HEEL AND MIDFOOT WITH FAT LAYER EXPOSED: ICD-10-CM

## 2025-03-05 DIAGNOSIS — Z87.891 PERSONAL HISTORY OF NICOTINE DEPENDENCE: ICD-10-CM

## 2025-03-05 DIAGNOSIS — K59.00 CONSTIPATION, UNSPECIFIED: ICD-10-CM

## 2025-03-05 DIAGNOSIS — K25.5 CHRONIC OR UNSPECIFIED GASTRIC ULCER WITH PERFORATION: Chronic | ICD-10-CM

## 2025-03-05 DIAGNOSIS — D64.9 ANEMIA, UNSPECIFIED: ICD-10-CM

## 2025-03-05 DIAGNOSIS — Z79.84 LONG TERM (CURRENT) USE OF ORAL HYPOGLYCEMIC DRUGS: ICD-10-CM

## 2025-03-05 DIAGNOSIS — E11.69 TYPE 2 DIABETES MELLITUS WITH OTHER SPECIFIED COMPLICATION: ICD-10-CM

## 2025-03-05 DIAGNOSIS — L89.322 PRESSURE ULCER OF LEFT BUTTOCK, STAGE 2: ICD-10-CM

## 2025-03-05 DIAGNOSIS — Z79.01 LONG TERM (CURRENT) USE OF ANTICOAGULANTS: ICD-10-CM

## 2025-03-05 DIAGNOSIS — L89.223 PRESSURE ULCER OF LEFT HIP, STAGE 3: ICD-10-CM

## 2025-03-05 DIAGNOSIS — S98.912A COMPLETE TRAUMATIC AMPUTATION OF LEFT FOOT, LEVEL UNSPECIFIED, INITIAL ENCOUNTER: Chronic | ICD-10-CM

## 2025-03-05 DIAGNOSIS — Z86.73 PERSONAL HISTORY OF TRANSIENT ISCHEMIC ATTACK (TIA), AND CEREBRAL INFARCTION WITHOUT RESIDUAL DEFICITS: ICD-10-CM

## 2025-03-05 DIAGNOSIS — M86.671 OTHER CHRONIC OSTEOMYELITIS, RIGHT ANKLE AND FOOT: ICD-10-CM

## 2025-03-05 DIAGNOSIS — E56.9 VITAMIN DEFICIENCY, UNSPECIFIED: ICD-10-CM

## 2025-03-05 DIAGNOSIS — I25.2 OLD MYOCARDIAL INFARCTION: ICD-10-CM

## 2025-03-05 DIAGNOSIS — E87.6 HYPOKALEMIA: ICD-10-CM

## 2025-03-05 DIAGNOSIS — Z89.432 ACQUIRED ABSENCE OF LEFT FOOT: ICD-10-CM

## 2025-03-05 PROCEDURE — 99213 OFFICE O/P EST LOW 20 MIN: CPT

## 2025-03-05 PROCEDURE — G0463: CPT

## 2025-03-05 NOTE — HISTORY OF PRESENT ILLNESS
[FreeTextEntry1] : 55 yo WM, w/c bound, here for f/u of bilateral buttock pressure ulcers. The buttock/sacral pressure ulcers have healed. Using triad cream.  The right heel press. ulcer is deanna well with tanvir.  No SOI. Reviewed importance of offload/frequent roation/change of position throughout the day/night. Here with his aide.

## 2025-03-05 NOTE — PLAN
[FreeTextEntry1] : offload; frequent rotation/change of position Right heel-tanvir, DD weekly Low back/buttocks-triad cream qd  F/u- 1 wk  time spent 25 mins.

## 2025-03-05 NOTE — VITALS
[Pain related to present condition?] : The patient's  pain is not related to present condition. [] : No [de-identified] : 0/10

## 2025-03-05 NOTE — REVIEW OF SYSTEMS
Skin Wound] : skin wound [Negative] : Psychiatric

## 2025-03-05 NOTE — PHYSICAL EXAM
[2 x 2] : 2 x 2  [Normal Thyroid] : the thyroid was normal [Normal Breath Sounds] : Normal breath sounds [Normal Heart Sounds] : normal heart sounds [Normal Rate and Rhythm] : normal rate and rhythm [Alert] : alert [Oriented to Person] : oriented to person [Oriented to Place] : oriented to place [Oriented to Time] : oriented to time [Calm] : calm [JVD] : no jugular venous distention  [Abdomen Masses] : No abdominal massess [Abdomen Tenderness] : ~T ~M No abdominal tenderness [Tender] : nontender [Enlarged] : not enlarged [de-identified] : adult WM, NAD, alert, Ox3 [4 x 4] : 4 x 4  [Abdominal Pad] : Abdominal Pad [FreeTextEntry2] : 3.8 [FreeTextEntry4] : 0.1 [FreeTextEntry3] : 1.5 [de-identified] : Serous/sanguinous [de-identified] : red granulation tissue  [de-identified] : ecchymosis  [de-identified] : Mechanically cleansed with sterile gauze and normal saline. cloth tape  [de-identified] : left buttock - fragile epithelial tissue  [de-identified] : mixed etiology of pressure and exposure ot incontinence  [de-identified] : no S/S of infection, consistent with previous skin injury [de-identified] : Triad cream  [de-identified] : Mechanically cleansed with sterile gauze and normal saline. Cloth tape  [FreeTextEntry1] : Right buttocks - fragile epithelial tissue  [de-identified] : mixed etiology of pressure and exposure ot incontinence  [de-identified] : consistent with previous skin injury, no S/S of infection noted [de-identified] : Triad cream  [de-identified] : Mechanically cleansed with steri [FreeTextEntry7] : posterior lower leg- closed [de-identified] : none [de-identified] : No [de-identified] : None [de-identified] : 100% [de-identified] : 3x Weekly [de-identified] : Primary Dressing [de-identified] : None [de-identified] : 3x Weekly [de-identified] : Secondary Dressing [de-identified] : None [de-identified] : No [de-identified] : No [de-identified] : Pressure [de-identified] : Erythema [de-identified] : None [de-identified] : None [de-identified] : None [de-identified] : No [de-identified] : Daily [TWNoteComboBox4] : None [TWNoteComboBox5] : No [TWNoteComboBox6] : Pressure [de-identified] : No [de-identified] : Erythema [de-identified] : None [de-identified] : None [de-identified] : None [de-identified] : No [de-identified] : Daily [TWNoteComboBox9] : Right [de-identified] : None [de-identified] : No [de-identified] : Pressure [de-identified] : No [de-identified] : Normal [de-identified] : None [de-identified] : None [de-identified] : None [de-identified] : No

## 2025-03-05 NOTE — ASSESSMENT
[Verbal] : Verbal [Written] : Written [Demo] : Demo [Patient] : Patient [Good - alert, interested, motivated] : Good - alert, interested, motivated [Verbalizes knowledge/Understanding] : Verbalizes knowledge/understanding [Dressing changes] : dressing changes [Skin Care] : skin care [Pressure relief] : pressure relief [Signs and symptoms of infection] : sign and symptoms of infection [Nutrition] : nutrition [How and When to Call] : how and when to call [Pain Management] : pain management [Off-loading] : off-loading [Patient responsibility to plan of care] : patient responsibility to plan of care [] : Yes [Stable] : stable [Other: ____] : [unfilled] [Wheelchair] : Wheelchair [Not Applicable - Long Term Care/Home Health Agency] : Long Term Care/Home Health Agency: Not Applicable [FreeTextEntry2] : Infection prevention Localized wound care  Goal remaining pain free regarding wounds Offloading  Pressure relief Collagen matrix therapy  Patient had skin substitutes in the past [FreeTextEntry4] : -Instructions provided to patient to bring to facility. -Education reinforced on the importance of heel offloading and to continue the use of heel booties. -Follow up in 1 week.

## 2025-03-12 ENCOUNTER — OUTPATIENT (OUTPATIENT)
Dept: OUTPATIENT SERVICES | Facility: HOSPITAL | Age: 55
LOS: 1 days | Discharge: HOME | End: 2025-03-12
Payer: MEDICAID

## 2025-03-12 ENCOUNTER — APPOINTMENT (OUTPATIENT)
Dept: WOUND CARE | Facility: HOSPITAL | Age: 55
End: 2025-03-12
Payer: MEDICAID

## 2025-03-12 VITALS
BODY MASS INDEX: 28.23 KG/M2 | WEIGHT: 220 LBS | DIASTOLIC BLOOD PRESSURE: 79 MMHG | OXYGEN SATURATION: 98 % | TEMPERATURE: 97.9 F | SYSTOLIC BLOOD PRESSURE: 119 MMHG | RESPIRATION RATE: 16 BRPM | HEIGHT: 74 IN | HEART RATE: 44 BPM

## 2025-03-12 DIAGNOSIS — Z89.432 ACQUIRED ABSENCE OF LEFT FOOT: ICD-10-CM

## 2025-03-12 DIAGNOSIS — Z86.73 PERSONAL HISTORY OF TRANSIENT ISCHEMIC ATTACK (TIA), AND CEREBRAL INFARCTION WITHOUT RESIDUAL DEFICITS: ICD-10-CM

## 2025-03-12 DIAGNOSIS — D64.9 ANEMIA, UNSPECIFIED: ICD-10-CM

## 2025-03-12 DIAGNOSIS — K59.00 CONSTIPATION, UNSPECIFIED: ICD-10-CM

## 2025-03-12 DIAGNOSIS — S98.912A COMPLETE TRAUMATIC AMPUTATION OF LEFT FOOT, LEVEL UNSPECIFIED, INITIAL ENCOUNTER: Chronic | ICD-10-CM

## 2025-03-12 DIAGNOSIS — M86.171 OTHER ACUTE OSTEOMYELITIS, RIGHT ANKLE AND FOOT: ICD-10-CM

## 2025-03-12 DIAGNOSIS — E56.9 VITAMIN DEFICIENCY, UNSPECIFIED: ICD-10-CM

## 2025-03-12 DIAGNOSIS — E11.69 TYPE 2 DIABETES MELLITUS WITH OTHER SPECIFIED COMPLICATION: ICD-10-CM

## 2025-03-12 DIAGNOSIS — Z79.84 LONG TERM (CURRENT) USE OF ORAL HYPOGLYCEMIC DRUGS: ICD-10-CM

## 2025-03-12 DIAGNOSIS — L97.412 NON-PRESSURE CHRONIC ULCER OF RIGHT HEEL AND MIDFOOT WITH FAT LAYER EXPOSED: ICD-10-CM

## 2025-03-12 DIAGNOSIS — L89.312 PRESSURE ULCER OF RIGHT BUTTOCK, STAGE 2: ICD-10-CM

## 2025-03-12 DIAGNOSIS — M86.671 OTHER CHRONIC OSTEOMYELITIS, RIGHT ANKLE AND FOOT: ICD-10-CM

## 2025-03-12 DIAGNOSIS — E11.59 TYPE 2 DIABETES MELLITUS WITH OTHER CIRCULATORY COMPLICATIONS: ICD-10-CM

## 2025-03-12 DIAGNOSIS — E11.40 TYPE 2 DIABETES MELLITUS WITH DIABETIC NEUROPATHY, UNSPECIFIED: ICD-10-CM

## 2025-03-12 DIAGNOSIS — E87.6 HYPOKALEMIA: ICD-10-CM

## 2025-03-12 DIAGNOSIS — I25.2 OLD MYOCARDIAL INFARCTION: ICD-10-CM

## 2025-03-12 DIAGNOSIS — Z87.891 PERSONAL HISTORY OF NICOTINE DEPENDENCE: ICD-10-CM

## 2025-03-12 DIAGNOSIS — Z87.440 PERSONAL HISTORY OF URINARY (TRACT) INFECTIONS: ICD-10-CM

## 2025-03-12 DIAGNOSIS — Z79.899 OTHER LONG TERM (CURRENT) DRUG THERAPY: ICD-10-CM

## 2025-03-12 DIAGNOSIS — Z79.01 LONG TERM (CURRENT) USE OF ANTICOAGULANTS: ICD-10-CM

## 2025-03-12 DIAGNOSIS — K25.5 CHRONIC OR UNSPECIFIED GASTRIC ULCER WITH PERFORATION: Chronic | ICD-10-CM

## 2025-03-12 PROCEDURE — 99213 OFFICE O/P EST LOW 20 MIN: CPT

## 2025-03-12 PROCEDURE — G0463: CPT

## 2025-03-12 NOTE — PHYSICAL EXAM
[4 x 4] : 4 x 4  [Abdominal Pad] : Abdominal Pad [0] : left 0 [Alert] : alert [Oriented to Person] : oriented to person [Oriented to Place] : oriented to place [Oriented to Time] : oriented to time [Calm] : calm [de-identified] : Well-developed, Well-nourished in no acute distress. [de-identified] : . [de-identified] : 3/5 muscle and tendons crossing the foot and ankle to the right lower extremity, 4/5  muscle strength to the foot and ankle to the left lower extremity  [de-identified] : Right heel ulcer is  with granular wound base, mild serous  drainage, no infection, periwound skin is intact with no cellulitis. right heel noted with granular wound base  [FreeTextEntry1] : Right heel - island of epithelized tissue    [FreeTextEntry3] : 1.5 [FreeTextEntry2] : 3.5 [FreeTextEntry4] : 0.1  [de-identified] : Serous/sanguinous [de-identified] : Vee  [de-identified] : Mechanically cleansed with sterile gauze and normal saline. Kerlix  [FreeTextEntry7] : Right dorsal foot - Unstageable.  [de-identified] : Echymotic  [de-identified] : Betadine  [de-identified] : Mechanically cleansed with sterile gauze and normal saline. Kerlix  [de-identified] : Pt states there are no open wounds on the buttocks area and refuses to have area assessed. Continue with Triad cream.  [TWNoteComboBox4] : Small [TWNoteComboBox6] : Pressure [de-identified] : Normal [de-identified] : None [de-identified] : None [de-identified] : 100% [de-identified] : No [de-identified] : 3x Weekly [de-identified] : Primary Dressing [de-identified] : None [de-identified] : other [de-identified] : None [de-identified] : None [de-identified] : No [de-identified] : 3x Weekly [de-identified] : Primary Dressing

## 2025-03-12 NOTE — VITALS
[] : No [FreeTextEntry5] : Pt started Levothyroxine 50mcg  [de-identified] : Patient has no pain related to wound.

## 2025-03-12 NOTE — PHYSICAL EXAM
[4 x 4] : 4 x 4  [Abdominal Pad] : Abdominal Pad [0] : left 0 [Alert] : alert [Oriented to Person] : oriented to person [Oriented to Place] : oriented to place [Oriented to Time] : oriented to time [Calm] : calm [de-identified] : Well-developed, Well-nourished in no acute distress. [de-identified] : . [de-identified] : 3/5 muscle and tendons crossing the foot and ankle to the right lower extremity, 4/5  muscle strength to the foot and ankle to the left lower extremity  [de-identified] : Right heel ulcer is  with granular wound base, mild serous  drainage, no infection, periwound skin is intact with no cellulitis. right heel noted with granular wound base  [FreeTextEntry1] : Right heel - island of epithelized tissue    [FreeTextEntry2] : 3.5 [FreeTextEntry3] : 1.5 [FreeTextEntry4] : 0.1  [de-identified] : Serous/sanguinous [de-identified] : Vee  [de-identified] : Mechanically cleansed with sterile gauze and normal saline. Kerlix  [FreeTextEntry7] : Right dorsal foot - Unstageable.  [de-identified] : Echymotic  [de-identified] : Betadine  [de-identified] : Mechanically cleansed with sterile gauze and normal saline. Kerlix  [de-identified] : Pt states there are no open wounds on the buttocks area and refuses to have area assessed. Continue with Triad cream.  [TWNoteComboBox4] : Small [TWNoteComboBox6] : Pressure [de-identified] : Normal [de-identified] : None [de-identified] : None [de-identified] : 100% [de-identified] : No [de-identified] : 3x Weekly [de-identified] : Primary Dressing [de-identified] : None [de-identified] : other [de-identified] : None [de-identified] : None [de-identified] : No [de-identified] : 3x Weekly [de-identified] : Primary Dressing

## 2025-03-12 NOTE — VITALS
[] : No [de-identified] : Patient has no pain related to wound. [FreeTextEntry5] : Pt started Levothyroxine 50mcg

## 2025-03-12 NOTE — ASSESSMENT
[Verbal] : Verbal [Written] : Written [Demo] : Demo [Patient] : Patient [Caregiver] : Caregiver [Good - alert, interested, motivated] : Good - alert, interested, motivated [Needs reinforcement] : needs reinforcement [Dressing changes] : dressing changes [Skin Care] : skin care [Pressure relief] : pressure relief [Signs and symptoms of infection] : sign and symptoms of infection [Nutrition] : nutrition [How and When to Call] : how and when to call [Pain Management] : pain management [Patient responsibility to plan of care] : patient responsibility to plan of care [Stable] : stable [Other: ____] : [unfilled] [Wheelchair] : Wheelchair [Not Applicable - Long Term Care/Home Health Agency] : Long Term Care/Home Health Agency: Not Applicable [] : No [FreeTextEntry2] : Infection prevention Localized wound care  Goal remaining pain free regarding wounds Offloading   Promote optimal skin care and nutrition. [FreeTextEntry4] : supply script and WC instructions provided to patients accompanied health aide to bring back to facility.  Pt to continue with strict heel offloading as well as turning and repositioning at least every 2 hours to alleviate pressure from buttocks area.  Follow up in 1 week

## 2025-03-19 ENCOUNTER — APPOINTMENT (OUTPATIENT)
Dept: WOUND CARE | Facility: HOSPITAL | Age: 55
End: 2025-03-19

## 2025-03-26 ENCOUNTER — APPOINTMENT (OUTPATIENT)
Dept: WOUND CARE | Facility: HOSPITAL | Age: 55
End: 2025-03-26
Payer: MEDICAID

## 2025-03-26 ENCOUNTER — OUTPATIENT (OUTPATIENT)
Dept: OUTPATIENT SERVICES | Facility: HOSPITAL | Age: 55
LOS: 1 days | Discharge: HOME | End: 2025-03-26
Payer: MEDICAID

## 2025-03-26 VITALS
HEIGHT: 74 IN | DIASTOLIC BLOOD PRESSURE: 77 MMHG | SYSTOLIC BLOOD PRESSURE: 109 MMHG | RESPIRATION RATE: 18 BRPM | OXYGEN SATURATION: 95 % | TEMPERATURE: 98.5 F | BODY MASS INDEX: 28.23 KG/M2 | WEIGHT: 220 LBS | HEART RATE: 69 BPM

## 2025-03-26 DIAGNOSIS — S98.912A COMPLETE TRAUMATIC AMPUTATION OF LEFT FOOT, LEVEL UNSPECIFIED, INITIAL ENCOUNTER: Chronic | ICD-10-CM

## 2025-03-26 DIAGNOSIS — L89.312 PRESSURE ULCER OF RIGHT BUTTOCK, STAGE 2: ICD-10-CM

## 2025-03-26 DIAGNOSIS — M86.671 OTHER CHRONIC OSTEOMYELITIS, RIGHT ANKLE AND FOOT: ICD-10-CM

## 2025-03-26 DIAGNOSIS — Z98.890 OTHER SPECIFIED POSTPROCEDURAL STATES: Chronic | ICD-10-CM

## 2025-03-26 DIAGNOSIS — L89.322 PRESSURE ULCER OF LEFT BUTTOCK, STAGE 2: ICD-10-CM

## 2025-03-26 DIAGNOSIS — K25.5 CHRONIC OR UNSPECIFIED GASTRIC ULCER WITH PERFORATION: Chronic | ICD-10-CM

## 2025-03-26 PROCEDURE — G0463: CPT

## 2025-03-26 PROCEDURE — 99213 OFFICE O/P EST LOW 20 MIN: CPT

## 2025-03-26 NOTE — ASSESSMENT
[Verbal] : Verbal [Written] : Written [Demo] : Demo [Patient] : Patient [Caregiver] : Caregiver [Good - alert, interested, motivated] : Good - alert, interested, motivated [Needs reinforcement] : needs reinforcement [Dressing changes] : dressing changes [Skin Care] : skin care [Pressure relief] : pressure relief [Signs and symptoms of infection] : sign and symptoms of infection [Nutrition] : nutrition [How and When to Call] : how and when to call [Pain Management] : pain management [Patient responsibility to plan of care] : patient responsibility to plan of care [] : Yes [Stable] : stable [Other: ____] : [unfilled] [Wheelchair] : Wheelchair [Not Applicable - Long Term Care/Home Health Agency] : Long Term Care/Home Health Agency: Not Applicable

## 2025-03-26 NOTE — PHYSICAL EXAM
[4 x 4] : 4 x 4  [Abdominal Pad] : Abdominal Pad [Normal Thyroid] : the thyroid was normal [Normal Breath Sounds] : Normal breath sounds [Normal Heart Sounds] : normal heart sounds [Normal Rate and Rhythm] : normal rate and rhythm [Alert] : alert [Oriented to Person] : oriented to person [Oriented to Place] : oriented to place [Oriented to Time] : oriented to time [Calm] : calm

## 2025-03-27 DIAGNOSIS — E11.69 TYPE 2 DIABETES MELLITUS WITH OTHER SPECIFIED COMPLICATION: ICD-10-CM

## 2025-03-27 DIAGNOSIS — M86.171 OTHER ACUTE OSTEOMYELITIS, RIGHT ANKLE AND FOOT: ICD-10-CM

## 2025-03-27 DIAGNOSIS — Z79.84 LONG TERM (CURRENT) USE OF ORAL HYPOGLYCEMIC DRUGS: ICD-10-CM

## 2025-03-27 DIAGNOSIS — I25.2 OLD MYOCARDIAL INFARCTION: ICD-10-CM

## 2025-03-27 DIAGNOSIS — L97.412 NON-PRESSURE CHRONIC ULCER OF RIGHT HEEL AND MIDFOOT WITH FAT LAYER EXPOSED: ICD-10-CM

## 2025-03-27 DIAGNOSIS — M86.671 OTHER CHRONIC OSTEOMYELITIS, RIGHT ANKLE AND FOOT: ICD-10-CM

## 2025-03-27 DIAGNOSIS — Z87.891 PERSONAL HISTORY OF NICOTINE DEPENDENCE: ICD-10-CM

## 2025-03-27 DIAGNOSIS — Z89.432 ACQUIRED ABSENCE OF LEFT FOOT: ICD-10-CM

## 2025-03-27 DIAGNOSIS — Z86.73 PERSONAL HISTORY OF TRANSIENT ISCHEMIC ATTACK (TIA), AND CEREBRAL INFARCTION WITHOUT RESIDUAL DEFICITS: ICD-10-CM

## 2025-03-27 DIAGNOSIS — Z79.01 LONG TERM (CURRENT) USE OF ANTICOAGULANTS: ICD-10-CM

## 2025-03-27 DIAGNOSIS — E56.9 VITAMIN DEFICIENCY, UNSPECIFIED: ICD-10-CM

## 2025-03-27 DIAGNOSIS — K59.00 CONSTIPATION, UNSPECIFIED: ICD-10-CM

## 2025-03-27 DIAGNOSIS — E11.40 TYPE 2 DIABETES MELLITUS WITH DIABETIC NEUROPATHY, UNSPECIFIED: ICD-10-CM

## 2025-03-27 DIAGNOSIS — D64.9 ANEMIA, UNSPECIFIED: ICD-10-CM

## 2025-03-27 DIAGNOSIS — Z79.899 OTHER LONG TERM (CURRENT) DRUG THERAPY: ICD-10-CM

## 2025-03-27 DIAGNOSIS — L89.322 PRESSURE ULCER OF LEFT BUTTOCK, STAGE 2: ICD-10-CM

## 2025-03-27 DIAGNOSIS — E87.6 HYPOKALEMIA: ICD-10-CM

## 2025-03-27 DIAGNOSIS — Z87.440 PERSONAL HISTORY OF URINARY (TRACT) INFECTIONS: ICD-10-CM

## 2025-03-27 DIAGNOSIS — E11.59 TYPE 2 DIABETES MELLITUS WITH OTHER CIRCULATORY COMPLICATIONS: ICD-10-CM

## 2025-04-01 ENCOUNTER — APPOINTMENT (OUTPATIENT)
Dept: WOUND CARE | Facility: HOSPITAL | Age: 55
End: 2025-04-01
Payer: MEDICAID

## 2025-04-01 ENCOUNTER — OUTPATIENT (OUTPATIENT)
Dept: OUTPATIENT SERVICES | Facility: HOSPITAL | Age: 55
LOS: 1 days | Discharge: HOME | End: 2025-04-01
Payer: MEDICAID

## 2025-04-01 VITALS
SYSTOLIC BLOOD PRESSURE: 127 MMHG | OXYGEN SATURATION: 92 % | HEART RATE: 47 BPM | DIASTOLIC BLOOD PRESSURE: 87 MMHG | BODY MASS INDEX: 28.23 KG/M2 | WEIGHT: 220 LBS | RESPIRATION RATE: 18 BRPM | HEIGHT: 74 IN | TEMPERATURE: 99.1 F

## 2025-04-01 DIAGNOSIS — E11.69 TYPE 2 DIABETES MELLITUS WITH OTHER SPECIFIED COMPLICATION: ICD-10-CM

## 2025-04-01 DIAGNOSIS — I25.2 OLD MYOCARDIAL INFARCTION: ICD-10-CM

## 2025-04-01 DIAGNOSIS — E11.40 TYPE 2 DIABETES MELLITUS WITH DIABETIC NEUROPATHY, UNSPECIFIED: ICD-10-CM

## 2025-04-01 DIAGNOSIS — L97.412 NON-PRESSURE CHRONIC ULCER OF RIGHT HEEL AND MIDFOOT WITH FAT LAYER EXPOSED: ICD-10-CM

## 2025-04-01 DIAGNOSIS — E11.59 TYPE 2 DIABETES MELLITUS WITH OTHER CIRCULATORY COMPLICATIONS: ICD-10-CM

## 2025-04-01 DIAGNOSIS — S98.912A COMPLETE TRAUMATIC AMPUTATION OF LEFT FOOT, LEVEL UNSPECIFIED, INITIAL ENCOUNTER: Chronic | ICD-10-CM

## 2025-04-01 DIAGNOSIS — Z87.891 PERSONAL HISTORY OF NICOTINE DEPENDENCE: ICD-10-CM

## 2025-04-01 DIAGNOSIS — M86.671 OTHER CHRONIC OSTEOMYELITIS, RIGHT ANKLE AND FOOT: ICD-10-CM

## 2025-04-01 DIAGNOSIS — M86.171 OTHER ACUTE OSTEOMYELITIS, RIGHT ANKLE AND FOOT: ICD-10-CM

## 2025-04-01 DIAGNOSIS — D64.9 ANEMIA, UNSPECIFIED: ICD-10-CM

## 2025-04-01 DIAGNOSIS — Z79.01 LONG TERM (CURRENT) USE OF ANTICOAGULANTS: ICD-10-CM

## 2025-04-01 DIAGNOSIS — Z89.432 ACQUIRED ABSENCE OF LEFT FOOT: ICD-10-CM

## 2025-04-01 DIAGNOSIS — Z79.899 OTHER LONG TERM (CURRENT) DRUG THERAPY: ICD-10-CM

## 2025-04-01 DIAGNOSIS — K25.5 CHRONIC OR UNSPECIFIED GASTRIC ULCER WITH PERFORATION: Chronic | ICD-10-CM

## 2025-04-01 DIAGNOSIS — K59.00 CONSTIPATION, UNSPECIFIED: ICD-10-CM

## 2025-04-01 DIAGNOSIS — Z87.440 PERSONAL HISTORY OF URINARY (TRACT) INFECTIONS: ICD-10-CM

## 2025-04-01 DIAGNOSIS — E56.9 VITAMIN DEFICIENCY, UNSPECIFIED: ICD-10-CM

## 2025-04-01 DIAGNOSIS — E87.6 HYPOKALEMIA: ICD-10-CM

## 2025-04-01 DIAGNOSIS — Z86.73 PERSONAL HISTORY OF TRANSIENT ISCHEMIC ATTACK (TIA), AND CEREBRAL INFARCTION WITHOUT RESIDUAL DEFICITS: ICD-10-CM

## 2025-04-01 DIAGNOSIS — Z98.890 OTHER SPECIFIED POSTPROCEDURAL STATES: Chronic | ICD-10-CM

## 2025-04-01 DIAGNOSIS — Z79.84 LONG TERM (CURRENT) USE OF ORAL HYPOGLYCEMIC DRUGS: ICD-10-CM

## 2025-04-01 PROCEDURE — G0463: CPT

## 2025-04-01 PROCEDURE — 99213 OFFICE O/P EST LOW 20 MIN: CPT

## 2025-04-01 NOTE — ASSESSMENT
[Verbal] : Verbal [Written] : Written [Demo] : Demo [Patient] : Patient [Caregiver] : Caregiver [Good - alert, interested, motivated] : Good - alert, interested, motivated [Needs reinforcement] : needs reinforcement [Dressing changes] : dressing changes [Skin Care] : skin care [Pressure relief] : pressure relief [Signs and symptoms of infection] : sign and symptoms of infection [Nutrition] : nutrition [How and When to Call] : how and when to call [Pain Management] : pain management [Patient responsibility to plan of care] : patient responsibility to plan of care [Stable] : stable [Other: ____] : [unfilled] [Wheelchair] : Wheelchair [Not Applicable - Long Term Care/Home Health Agency] : Long Term Care/Home Health Agency: Not Applicable [] : Yes [FreeTextEntry2] : Infection prevention Localized wound care  Goal remaining pain free regarding wounds Offloading  Promote optimal skin care and nutrition. [FreeTextEntry4] : Pt to continue with strict heel offloading as well as turning and repositioning at least every 2 hours to alleviate pressure from buttocks area.  Patient verbalized understanding of all discussed. Patient given copy of wound care instructions. Follow up in 1 week for heel and 2 weeks for buttock (buttock on next visit) [FreeTextEntry1] : Right heel ulcer is clean, stable, no acute infection.

## 2025-04-01 NOTE — PHYSICAL EXAM
[4 x 4] : 4 x 4  [Abdominal Pad] : Abdominal Pad [Normal Breath Sounds] : Normal breath sounds [Normal Heart Sounds] : normal heart sounds [Alert] : alert [Oriented to Person] : oriented to person [Oriented to Place] : oriented to place [Oriented to Time] : oriented to time [Calm] : calm [de-identified] : Well-developed, Well-nourished in no acute distress. [de-identified] : Within normal limits. [de-identified] : Within normal limits. [de-identified] : Within normal limits. [de-identified] : Right heel ulcer is clean, base is red and viable, moderate hyper granulation, no drainage, no odor, no acute infection, periwound skin is intact with no cellulitis. [FreeTextEntry1] : Right heel - island of epithelized tissue    [FreeTextEntry2] : 2.5 [FreeTextEntry3] : 1.5 [FreeTextEntry4] : 0.1 [de-identified] : Serous/sanguinous [de-identified] : hypergranular [de-identified] : Xeroform [de-identified] : Mechanically cleansed with sterile gauze and normal saline. Kerlix   silver nitrate applied to wound bed by alyse QUINN tissue [FreeTextEntry7] : Left Buttock - ASSESS NEXT VISIT [de-identified] : Excoriated Bilateral buttocks [de-identified] : Triad [de-identified] : Mechanically cleansed with sterile gauze and normal saline.  [TWNoteComboBox4] : Moderate [TWNoteComboBox5] : No [TWNoteComboBox6] : Pressure [de-identified] : No [de-identified] : Normal [de-identified] : None [de-identified] : None [de-identified] : 100% [de-identified] : No [de-identified] : Primary Dressing [de-identified] : 3x Weekly [de-identified] : None [de-identified] : No [de-identified] : Pressure [de-identified] : No [de-identified] : other [de-identified] : None [de-identified] : 3x Weekly [de-identified] : Primary Dressing

## 2025-04-01 NOTE — HISTORY OF PRESENT ILLNESS
[FreeTextEntry1] : Only heel ulcer was assessed today, right heel ulcer is clean, hypergranular, no C/O

## 2025-04-01 NOTE — VITALS
[Pain related to present condition?] : The patient's  pain is not related to present condition. [] : No [de-identified] : 0/10

## 2025-04-01 NOTE — PLAN
[FreeTextEntry1] : Heel ulcer was treated with two Silver Nitrate stick, Xeroform, Dry dressing, return to office in two weeks. 25 minutes spent for patient care and medical decision making.

## 2025-04-08 NOTE — H&P ADULT - NSHPPOASURGSITEINCISION_GEN_ALL_CORE
no

## 2025-04-09 ENCOUNTER — APPOINTMENT (OUTPATIENT)
Dept: WOUND CARE | Facility: HOSPITAL | Age: 55
End: 2025-04-09
Payer: MEDICAID

## 2025-04-09 ENCOUNTER — OUTPATIENT (OUTPATIENT)
Dept: OUTPATIENT SERVICES | Facility: HOSPITAL | Age: 55
LOS: 1 days | End: 2025-04-09
Payer: MEDICAID

## 2025-04-09 VITALS
DIASTOLIC BLOOD PRESSURE: 68 MMHG | WEIGHT: 220 LBS | BODY MASS INDEX: 28.23 KG/M2 | OXYGEN SATURATION: 93 % | SYSTOLIC BLOOD PRESSURE: 111 MMHG | HEIGHT: 74 IN | HEART RATE: 56 BPM | RESPIRATION RATE: 18 BRPM | TEMPERATURE: 99.1 F

## 2025-04-09 DIAGNOSIS — Z98.890 OTHER SPECIFIED POSTPROCEDURAL STATES: Chronic | ICD-10-CM

## 2025-04-09 DIAGNOSIS — K25.5 CHRONIC OR UNSPECIFIED GASTRIC ULCER WITH PERFORATION: Chronic | ICD-10-CM

## 2025-04-09 DIAGNOSIS — L89.312 PRESSURE ULCER OF RIGHT BUTTOCK, STAGE 2: ICD-10-CM

## 2025-04-09 DIAGNOSIS — S98.912A COMPLETE TRAUMATIC AMPUTATION OF LEFT FOOT, LEVEL UNSPECIFIED, INITIAL ENCOUNTER: Chronic | ICD-10-CM

## 2025-04-09 DIAGNOSIS — L89.322 PRESSURE ULCER OF LEFT BUTTOCK, STAGE 2: ICD-10-CM

## 2025-04-09 PROCEDURE — G0463: CPT

## 2025-04-09 PROCEDURE — 99213 OFFICE O/P EST LOW 20 MIN: CPT

## 2025-04-09 RX ORDER — CLOTRIMAZOLE AND BETAMETHASONE DIPROPIONATE 10; .5 MG/G; MG/G
1-0.05 CREAM TOPICAL
Qty: 90 | Refills: 4 | Status: ACTIVE | COMMUNITY
Start: 2025-04-09 | End: 1900-01-01

## 2025-04-09 NOTE — ASSESSMENT
[Verbal] : Verbal [Written] : Written [Demo] : Demo [Patient] : Patient [Caregiver] : Caregiver [Good - alert, interested, motivated] : Good - alert, interested, motivated [Needs reinforcement] : needs reinforcement [Dressing changes] : dressing changes [Skin Care] : skin care [Pressure relief] : pressure relief [Signs and symptoms of infection] : sign and symptoms of infection [Nutrition] : nutrition [How and When to Call] : how and when to call [Pain Management] : pain management [Patient responsibility to plan of care] : patient responsibility to plan of care [] : Yes [Stable] : stable [Other: ____] : [unfilled] [Wheelchair] : Wheelchair [Not Applicable - Long Term Care/Home Health Agency] : Long Term Care/Home Health Agency: Not Applicable [FreeTextEntry2] : Infection prevention Localized wound care  Goal remaining pain free regarding wounds Offloading  Promote optimal skin care and nutrition. [FreeTextEntry4] : Pt to continue with strict heel offloading as well as turning and repositioning at least every 2 hours to alleviate pressure from buttocks area.  Patient verbalized understanding of all discussed. Patient given copy of wound care instructions. Follow up in 1 week for heel and 2 weeks for buttock.

## 2025-04-09 NOTE — VITALS
[Pain related to present condition?] : The patient's  pain is not related to present condition. [] : No [de-identified] : 0/10

## 2025-04-09 NOTE — HISTORY OF PRESENT ILLNESS
[FreeTextEntry1] : Only heel ulcer was assessed today, right heel ulcer is clean, hypergranular, no C/O 4/9/25 right heel ulcer appears smaller and very clean. No SOI  Bilateral buttock,sacrum and lower back appear excoriated. No Open areas

## 2025-04-09 NOTE — PLAN
[FreeTextEntry1] : Heel ulcer ,  Vee,adapticDry dressing, 3X/week,  bilateral buttock,sacrum and lower back Lotrisone,QD return to office in two weeks.  25 minutes spent for patient care and medical decision making.

## 2025-04-09 NOTE — PHYSICAL EXAM
[Normal Breath Sounds] : Normal breath sounds [Normal Heart Sounds] : normal heart sounds [Alert] : alert [Oriented to Person] : oriented to person [Oriented to Place] : oriented to place [Oriented to Time] : oriented to time [Calm] : calm [4 x 4] : 4 x 4  [Abdominal Pad] : Abdominal Pad [de-identified] : Well-developed, Well-nourished in no acute distress. [de-identified] : Within normal limits. [de-identified] : Within normal limits. [de-identified] : Within normal limits. [de-identified] : Right heel ulcer is clean, base is red and viable, moderate hyper granulation, no drainage, no odor, no acute infection, periwound skin is intact with no cellulitis. [FreeTextEntry1] : Right heel - island of epithelized tissue    [FreeTextEntry2] : 3.1 [FreeTextEntry3] : 1.7 [FreeTextEntry4] : 0.1 [de-identified] : Serous/sanguinous [de-identified] : Vee, Adaptic Touch  [de-identified] : Mechanically cleansed with sterile gauze and normal saline. Kerlix   [FreeTextEntry7] : Bilateral buttock [de-identified] : Excoriated Bilateral buttocks [de-identified] : Lotrisone [de-identified] : Mechanically cleansed with sterile gauze and normal saline.  [TWNoteComboBox4] : Moderate [TWNoteComboBox5] : No [TWNoteComboBox6] : Pressure [de-identified] : No [de-identified] : Normal [de-identified] : None [de-identified] : None [de-identified] : 100% [de-identified] : No [de-identified] : 3x Weekly [de-identified] : Primary Dressing [de-identified] : None [de-identified] : No [de-identified] : Pressure [de-identified] : No [de-identified] : other [de-identified] : None [de-identified] : 3x Weekly [de-identified] : Primary Dressing

## 2025-04-10 DIAGNOSIS — L89.312 PRESSURE ULCER OF RIGHT BUTTOCK, STAGE 2: ICD-10-CM

## 2025-04-10 DIAGNOSIS — M86.171 OTHER ACUTE OSTEOMYELITIS, RIGHT ANKLE AND FOOT: ICD-10-CM

## 2025-04-10 DIAGNOSIS — Z87.891 PERSONAL HISTORY OF NICOTINE DEPENDENCE: ICD-10-CM

## 2025-04-10 DIAGNOSIS — Z79.01 LONG TERM (CURRENT) USE OF ANTICOAGULANTS: ICD-10-CM

## 2025-04-10 DIAGNOSIS — Z89.432 ACQUIRED ABSENCE OF LEFT FOOT: ICD-10-CM

## 2025-04-10 DIAGNOSIS — M86.671 OTHER CHRONIC OSTEOMYELITIS, RIGHT ANKLE AND FOOT: ICD-10-CM

## 2025-04-10 DIAGNOSIS — E11.59 TYPE 2 DIABETES MELLITUS WITH OTHER CIRCULATORY COMPLICATIONS: ICD-10-CM

## 2025-04-10 DIAGNOSIS — L89.322 PRESSURE ULCER OF LEFT BUTTOCK, STAGE 2: ICD-10-CM

## 2025-04-10 DIAGNOSIS — E56.9 VITAMIN DEFICIENCY, UNSPECIFIED: ICD-10-CM

## 2025-04-10 DIAGNOSIS — E11.40 TYPE 2 DIABETES MELLITUS WITH DIABETIC NEUROPATHY, UNSPECIFIED: ICD-10-CM

## 2025-04-10 DIAGNOSIS — I25.2 OLD MYOCARDIAL INFARCTION: ICD-10-CM

## 2025-04-10 DIAGNOSIS — Z79.84 LONG TERM (CURRENT) USE OF ORAL HYPOGLYCEMIC DRUGS: ICD-10-CM

## 2025-04-10 DIAGNOSIS — E11.69 TYPE 2 DIABETES MELLITUS WITH OTHER SPECIFIED COMPLICATION: ICD-10-CM

## 2025-04-10 DIAGNOSIS — Z87.440 PERSONAL HISTORY OF URINARY (TRACT) INFECTIONS: ICD-10-CM

## 2025-04-10 DIAGNOSIS — E87.6 HYPOKALEMIA: ICD-10-CM

## 2025-04-10 DIAGNOSIS — Z86.73 PERSONAL HISTORY OF TRANSIENT ISCHEMIC ATTACK (TIA), AND CEREBRAL INFARCTION WITHOUT RESIDUAL DEFICITS: ICD-10-CM

## 2025-04-10 DIAGNOSIS — K59.00 CONSTIPATION, UNSPECIFIED: ICD-10-CM

## 2025-04-10 DIAGNOSIS — D64.9 ANEMIA, UNSPECIFIED: ICD-10-CM

## 2025-04-10 DIAGNOSIS — Z79.899 OTHER LONG TERM (CURRENT) DRUG THERAPY: ICD-10-CM

## 2025-04-10 DIAGNOSIS — L97.412 NON-PRESSURE CHRONIC ULCER OF RIGHT HEEL AND MIDFOOT WITH FAT LAYER EXPOSED: ICD-10-CM

## 2025-04-16 ENCOUNTER — OUTPATIENT (OUTPATIENT)
Dept: OUTPATIENT SERVICES | Facility: HOSPITAL | Age: 55
LOS: 1 days | End: 2025-04-16
Payer: MEDICAID

## 2025-04-16 ENCOUNTER — APPOINTMENT (OUTPATIENT)
Dept: WOUND CARE | Facility: HOSPITAL | Age: 55
End: 2025-04-16
Payer: MEDICAID

## 2025-04-16 VITALS
HEART RATE: 49 BPM | HEIGHT: 74 IN | OXYGEN SATURATION: 95 % | SYSTOLIC BLOOD PRESSURE: 127 MMHG | WEIGHT: 220 LBS | RESPIRATION RATE: 18 BRPM | BODY MASS INDEX: 28.23 KG/M2 | TEMPERATURE: 97.8 F | DIASTOLIC BLOOD PRESSURE: 83 MMHG

## 2025-04-16 DIAGNOSIS — S98.912A COMPLETE TRAUMATIC AMPUTATION OF LEFT FOOT, LEVEL UNSPECIFIED, INITIAL ENCOUNTER: Chronic | ICD-10-CM

## 2025-04-16 DIAGNOSIS — K25.5 CHRONIC OR UNSPECIFIED GASTRIC ULCER WITH PERFORATION: Chronic | ICD-10-CM

## 2025-04-16 DIAGNOSIS — M86.171 OTHER ACUTE OSTEOMYELITIS, RIGHT ANKLE AND FOOT: ICD-10-CM

## 2025-04-16 DIAGNOSIS — L97.412 NON-PRESSURE CHRONIC ULCER OF RIGHT HEEL AND MIDFOOT WITH FAT LAYER EXPOSED: ICD-10-CM

## 2025-04-16 DIAGNOSIS — M86.671 OTHER CHRONIC OSTEOMYELITIS, RIGHT ANKLE AND FOOT: ICD-10-CM

## 2025-04-16 DIAGNOSIS — E11.69 TYPE 2 DIABETES MELLITUS WITH OTHER SPECIFIED COMPLICATION: ICD-10-CM

## 2025-04-16 PROCEDURE — G0463: CPT

## 2025-04-16 PROCEDURE — 99213 OFFICE O/P EST LOW 20 MIN: CPT

## 2025-04-16 NOTE — PLAN
[FreeTextEntry1] : right Heel ulcer ,  Vee,Adaptic, Dry dressing,  return to office in two weeks.  25 minutes spent for patient care and medical decision making.

## 2025-04-16 NOTE — CHART NOTE - NSCHARTNOTEFT_GEN_A_CORE
INSTRUMENTAL SWALLOW REPORT  MODIFIED BARIUM SWALLOW    NAME: Madonna Manrique   : 1969  MRN: [id number]       Date of Eval: 2025        Referring Diagnosis(es): Multiple Sclerosis, Dysphagia    Past Medical History:  has a past medical history of Alcoholism (HCC), Anxiety, Asthma, Bipolar 1 disorder (HCC), Borderline personality disorder (HCC), Chronic uveitis of both eyes, Depression, Drug abuse, opioid type (HCC), DVT (deep vein thrombosis) in pregnancy, History of blood transfusion, Long QT interval, May-Thurner syndrome, Multiple sclerosis (HCC), Osteoporosis, Pulmonary emboli (HCC), Severe depression (HCC), Suicide gesture ([facility]), and Uveitis of both eyes.  Past Surgical History:  has a past surgical history that includes Gastric bypass surgery (); Cholecystectomy; Hysterectomy;  section; other surgical history (); Leg Surgery (Left, ); Carpal tunnel release (Bilateral); Tibia fracture surgery (Right, 2023); Leg Surgery (Right, 2023); Tibia fracture surgery (Right, 2023); and Leg Surgery (Right, 2023).      Type of Study: Repeat MBS    Results of Prior Study: [facility], 25: Aspiration of thin consistency. Penetration without aspiration of nectar  thick and solid consistency.  [facility] 25: Dysphagia Soft and Bite-Sized (Dysphagia III) (IDDSI Level 6) and Moderately Thick (Honey) (IDDSI Level 3)  liquids vis CUP ONLY    Recent CXR/CT of Chest: 25 CT CHEST W IV CONTRAST   There are patchy infiltrative changes throughout both lung fields,   most severe in the lower lobes. No pneumothorax is seen. No hilar or mediastinal   mass or adenopathy are identified. There are scattered coronary artery   calcifications. The visualized osseous structures are intact.     Patient Complaints/Reason for Referral:  Madonna Manrique was referred for a MBS to assess the efficiency of his/her swallow function, assess for aspiration, and to make recommendations  Do you have Advance Directives (HCP / LV / Organ donation / Documentation of oral advance Directive):   (  x  )  yes    (      )    NO                                                                            Do you have LV - Living will :                                                                                                                                             (    )  yes    (    x  )   No    Do you have HCP - Health Care Proxy:                                                                                                                            (   x  )  yes   (       ) N0    Do you have DNR- Do Not Resuscitate :                                                                                                                           (      )  yes  (   x     )  No    Do you have DNI- Do Not intubate  :                                                                                                                               (      )  yes   (   x    ) No    Do you have MOLST - Medical orders for Life sustaining treatment  :                                                                    (      ) yes    (    x   ) No    Decision Maker :  (  x   ) Patient     (      )  HCA   (     ) Public Health Law Surrogate     (      ) Surrogate  (       ) Guardian    Goals of Care :  (   x   )   Complete Care     (       ) No Limitations                              (       )   Comfort Care       (       )  Hospice                               (      )   Limited medical Intervention / s    Medical Interventions :   (    x    )   CPR       (        )  DNR                                               (   x     )  Intubation with MV - Mechanical Ventilation  (   x   ) BIPAP/CPAP    (         )   DNI                                               (      x   )  Artificial Nutrition -  IVF, TPN / PPN, Tube Feeds             (         )   No Feeding Tube                                                (     x   ) Use Antibiotics                         (          ) No Antibiotics                                                (     x    ) Blood and Blood Products     (         )   No Blood or Blood products                                                (     x     )  Dialysis                                    (         )  No Dialysis                                                (          )  Medical Management only  (         )  No Invasive Interventions or Surgery  Time spent :                        (       ) upto 30 minutes                       (      x     )   more than 30 minutes  GOC reviewed and discussed

## 2025-04-16 NOTE — REVIEW OF SYSTEMS
Call placed to patient at 1:20pm. He left clinic and returned home against our advice. He did not seek care in the ED. He remains asymptomatic and is hydrating. If his heart rate remains elevated this afternoon he reports he will seek evaluation in the ED. Discussed risks and benefits of not seeking care. He verbalized understanding and will further monitor his symptoms.   Roxanne Forbes, NP     [Negative] : Heme/Lymph

## 2025-04-16 NOTE — HISTORY OF PRESENT ILLNESS
[FreeTextEntry1] : Only heel ulcer was assessed , right heel ulcer is clean, hypergranular, no C/O 4/16/25 right heel assessment today. Smaller with healthy granulation noted. No SOI

## 2025-04-16 NOTE — PHYSICAL EXAM
[4 x 4] : 4 x 4  [Abdominal Pad] : Abdominal Pad [Normal Breath Sounds] : Normal breath sounds [Normal Heart Sounds] : normal heart sounds [Alert] : alert [Oriented to Person] : oriented to person [Oriented to Place] : oriented to place [Oriented to Time] : oriented to time [Calm] : calm [de-identified] : Well-developed, Well-nourished in no acute distress. [de-identified] : Within normal limits. [de-identified] : Within normal limits. [de-identified] : Within normal limits. [de-identified] : Right heel ulcer is clean, base is red and viable, moderate hyper granulation, no drainage, no odor, no acute infection, periwound skin is intact with no cellulitis. [FreeTextEntry1] : Right heel - island of epithelized tissue    [FreeTextEntry2] : 3.1 [FreeTextEntry3] : 1.7 [FreeTextEntry4] : 0.1 [de-identified] : Serous/sanguinous [de-identified] : Vee, Adaptic Touch  [de-identified] : Mechanically cleansed with sterile gauze and normal saline. Kerlix   [FreeTextEntry7] : Bilateral buttock - to be assessed next week. [TWNoteComboBox4] : Moderate [TWNoteComboBox5] : No [TWNoteComboBox6] : Pressure [de-identified] : No [de-identified] : Normal [de-identified] : None [de-identified] : None [de-identified] : 100% [de-identified] : No [de-identified] : 3x Weekly [de-identified] : Primary Dressing

## 2025-04-16 NOTE — VITALS
[Pain related to present condition?] : The patient's  pain is not related to present condition. [] : No [de-identified] : 0/10

## 2025-04-16 NOTE — ASSESSMENT
[Verbal] : Verbal [Written] : Written [Demo] : Demo [Patient] : Patient [Caregiver] : Caregiver [Good - alert, interested, motivated] : Good - alert, interested, motivated [Needs reinforcement] : needs reinforcement [Dressing changes] : dressing changes [Skin Care] : skin care [Pressure relief] : pressure relief [Signs and symptoms of infection] : sign and symptoms of infection [Nutrition] : nutrition [How and When to Call] : how and when to call [Pain Management] : pain management [Patient responsibility to plan of care] : patient responsibility to plan of care PRINCIPAL DISCHARGE DIAGNOSIS  Diagnosis: Diverticulitis  Assessment and Plan of Treatment:       SECONDARY DISCHARGE DIAGNOSES  Diagnosis: Diarrhea  Assessment and Plan of Treatment:      [] : Yes [Stable] : stable [Other: ____] : [unfilled] [Wheelchair] : Wheelchair [Not Applicable - Long Term Care/Home Health Agency] : Long Term Care/Home Health Agency: Not Applicable [FreeTextEntry2] : Infection prevention Localized wound care  Goal remaining pain free regarding wounds Offloading  Promote optimal skin care and nutrition. [FreeTextEntry4] : Pt to continue with strict heel offloading as well as turning and repositioning at least every 2 hours to alleviate pressure from buttocks area.  Patient verbalized understanding of all discussed. Patient given copy of wound care instructions. Follows up 1 week for right heel and 2 weeks for buttock. Next visit to be assessed for both sites.

## 2025-04-17 DIAGNOSIS — Z89.432 ACQUIRED ABSENCE OF LEFT FOOT: ICD-10-CM

## 2025-04-17 DIAGNOSIS — L97.412 NON-PRESSURE CHRONIC ULCER OF RIGHT HEEL AND MIDFOOT WITH FAT LAYER EXPOSED: ICD-10-CM

## 2025-04-17 DIAGNOSIS — Z86.73 PERSONAL HISTORY OF TRANSIENT ISCHEMIC ATTACK (TIA), AND CEREBRAL INFARCTION WITHOUT RESIDUAL DEFICITS: ICD-10-CM

## 2025-04-17 DIAGNOSIS — Z87.891 PERSONAL HISTORY OF NICOTINE DEPENDENCE: ICD-10-CM

## 2025-04-17 DIAGNOSIS — I25.2 OLD MYOCARDIAL INFARCTION: ICD-10-CM

## 2025-04-17 DIAGNOSIS — E87.6 HYPOKALEMIA: ICD-10-CM

## 2025-04-17 DIAGNOSIS — L89.322 PRESSURE ULCER OF LEFT BUTTOCK, STAGE 2: ICD-10-CM

## 2025-04-17 DIAGNOSIS — E11.69 TYPE 2 DIABETES MELLITUS WITH OTHER SPECIFIED COMPLICATION: ICD-10-CM

## 2025-04-17 DIAGNOSIS — K59.00 CONSTIPATION, UNSPECIFIED: ICD-10-CM

## 2025-04-17 DIAGNOSIS — M86.171 OTHER ACUTE OSTEOMYELITIS, RIGHT ANKLE AND FOOT: ICD-10-CM

## 2025-04-17 DIAGNOSIS — E11.40 TYPE 2 DIABETES MELLITUS WITH DIABETIC NEUROPATHY, UNSPECIFIED: ICD-10-CM

## 2025-04-17 DIAGNOSIS — Z87.440 PERSONAL HISTORY OF URINARY (TRACT) INFECTIONS: ICD-10-CM

## 2025-04-17 DIAGNOSIS — Z79.84 LONG TERM (CURRENT) USE OF ORAL HYPOGLYCEMIC DRUGS: ICD-10-CM

## 2025-04-17 DIAGNOSIS — M86.671 OTHER CHRONIC OSTEOMYELITIS, RIGHT ANKLE AND FOOT: ICD-10-CM

## 2025-04-17 DIAGNOSIS — D64.9 ANEMIA, UNSPECIFIED: ICD-10-CM

## 2025-04-17 DIAGNOSIS — Z79.01 LONG TERM (CURRENT) USE OF ANTICOAGULANTS: ICD-10-CM

## 2025-04-17 DIAGNOSIS — L89.312 PRESSURE ULCER OF RIGHT BUTTOCK, STAGE 2: ICD-10-CM

## 2025-04-17 DIAGNOSIS — E11.59 TYPE 2 DIABETES MELLITUS WITH OTHER CIRCULATORY COMPLICATIONS: ICD-10-CM

## 2025-04-17 DIAGNOSIS — E56.9 VITAMIN DEFICIENCY, UNSPECIFIED: ICD-10-CM

## 2025-04-17 DIAGNOSIS — Z79.899 OTHER LONG TERM (CURRENT) DRUG THERAPY: ICD-10-CM

## 2025-04-23 ENCOUNTER — APPOINTMENT (OUTPATIENT)
Dept: WOUND CARE | Facility: HOSPITAL | Age: 55
End: 2025-04-23

## 2025-04-30 ENCOUNTER — OUTPATIENT (OUTPATIENT)
Dept: OUTPATIENT SERVICES | Facility: HOSPITAL | Age: 55
LOS: 1 days | End: 2025-04-30
Payer: MEDICAID

## 2025-04-30 ENCOUNTER — APPOINTMENT (OUTPATIENT)
Dept: WOUND CARE | Facility: HOSPITAL | Age: 55
End: 2025-04-30
Payer: MEDICAID

## 2025-04-30 DIAGNOSIS — K25.5 CHRONIC OR UNSPECIFIED GASTRIC ULCER WITH PERFORATION: Chronic | ICD-10-CM

## 2025-04-30 DIAGNOSIS — M86.672 OTHER CHRONIC OSTEOMYELITIS, LEFT ANKLE AND FOOT: ICD-10-CM

## 2025-04-30 DIAGNOSIS — L89.312 PRESSURE ULCER OF RIGHT BUTTOCK, STAGE 2: ICD-10-CM

## 2025-04-30 DIAGNOSIS — S98.912A COMPLETE TRAUMATIC AMPUTATION OF LEFT FOOT, LEVEL UNSPECIFIED, INITIAL ENCOUNTER: Chronic | ICD-10-CM

## 2025-04-30 DIAGNOSIS — L89.322 PRESSURE ULCER OF LEFT BUTTOCK, STAGE 2: ICD-10-CM

## 2025-04-30 DIAGNOSIS — Z98.890 OTHER SPECIFIED POSTPROCEDURAL STATES: Chronic | ICD-10-CM

## 2025-04-30 PROCEDURE — 99213 OFFICE O/P EST LOW 20 MIN: CPT

## 2025-04-30 PROCEDURE — G0463: CPT

## 2025-04-30 NOTE — ASSESSMENT
[Verbal] : Verbal [Written] : Written [Demo] : Demo [Patient] : Patient [Caregiver] : Caregiver [Good - alert, interested, motivated] : Good - alert, interested, motivated [Needs reinforcement] : needs reinforcement [Dressing changes] : dressing changes [Skin Care] : skin care [Pressure relief] : pressure relief [Signs and symptoms of infection] : sign and symptoms of infection [Nutrition] : nutrition [How and When to Call] : how and when to call [Pain Management] : pain management [Patient responsibility to plan of care] : patient responsibility to plan of care [] : Yes [Stable] : stable [Other: ____] : [unfilled] [Wheelchair] : Wheelchair [Not Applicable - Long Term Care/Home Health Agency] : Long Term Care/Home Health Agency: Not Applicable [Foot Care] : foot care [Off-loading] : off-loading [FreeTextEntry2] : Infection prevention Localized wound care  Goal remaining pain free regarding wounds Offloading  Promote optimal skin care and nutrition. [FreeTextEntry4] : Pt to continue with strict heel offloading as well as turning and repositioning at least every 2 hours to alleviate pressure from buttocks area.  Patient verbalized understanding of all discussed. Patient given copy of wound care instructions. Follows up 1 week for right heel and 2 weeks for buttock (nest visit just heel assessment, as both were assessed today)

## 2025-04-30 NOTE — PLAN
[FreeTextEntry1] : offload; frequent rotation/change of position triad cream qd   F/u-2 wks  time spent 25 mins.

## 2025-04-30 NOTE — HISTORY OF PRESENT ILLNESS
[FreeTextEntry1] : 53 yo WM, w/c bound, here for f/u of bilateral buttock pressure ulcers. Only scattered areas of excoriations remain. Has been using lotrisone cr. around the area due to a extensive macular well marginated rash.      Pt also seen by podiatry for his heel press.ulcer (right).

## 2025-04-30 NOTE — PHYSICAL EXAM
[4 x 4] : 4 x 4  [Abdominal Pad] : Abdominal Pad [Normal Thyroid] : the thyroid was normal [Normal Breath Sounds] : Normal breath sounds [Normal Heart Sounds] : normal heart sounds [Normal Rate and Rhythm] : normal rate and rhythm [Alert] : alert [Oriented to Person] : oriented to person [Oriented to Place] : oriented to place [Oriented to Time] : oriented to time [Calm] : calm [JVD] : no jugular venous distention  [Abdomen Masses] : No abdominal massess [Abdomen Tenderness] : ~T ~M No abdominal tenderness [Tender] : nontender [Enlarged] : not enlarged [de-identified] : adult WM, NAD, alert, Ox3. [FreeTextEntry1] : Right heel - island of epithelized tissue    [FreeTextEntry2] : 2.8 [FreeTextEntry3] : 0.8 [FreeTextEntry4] : 0.1 [de-identified] : Serous/sanguinous [de-identified] : Vee, Adaptic Touch  [de-identified] : Mechanically cleansed with sterile gauze and normal saline. Kerlix   [FreeTextEntry7] : Bilateral buttock- scattered areas of excoriation [FreeTextEntry8] : 0.5 [FreeTextEntry9] : 0.5 [de-identified] : 0.1 [de-identified] : serousanginous  [de-identified] : incontinence dermatitis [de-identified] : hyperpigmentation secondary to moisture exposure, no Signs of fungal infection [de-identified] : dermis  [de-identified] : TRIAD [de-identified] :  Mechanically cleansed with sterile gauze and normal saline 0.9%  [TWNoteComboBox4] : Small [TWNoteComboBox5] : No [TWNoteComboBox6] : Pressure [de-identified] : No [de-identified] : Normal [de-identified] : None [de-identified] : None [de-identified] : 100% [de-identified] : No [de-identified] : 3x Weekly [de-identified] : Primary Dressing [de-identified] : Small [de-identified] : No [de-identified] : Other [de-identified] : No [de-identified] : other [de-identified] : None [de-identified] : None [de-identified] : No [de-identified] : Daily [de-identified] : Primary Dressing

## 2025-04-30 NOTE — VITALS
[Pain related to present condition?] : The patient's  pain is not related to present condition. [] : No [de-identified] : 0/10

## 2025-05-01 NOTE — PLAN
[FreeTextEntry1] : .Patient seen and evaluated. Discussed etiology and treatment plan. Patient verbalized understanding. C/w local wound care applying Vee, Dry dressing, return to office in one week. Will encourage patient to start weight bearing once wound has completely healed.  Spent 20 minutes for patient care and medical decision making.

## 2025-05-01 NOTE — VITALS
[Pain related to present condition?] : The patient's  pain is not related to present condition. [] : No [de-identified] : 0/10

## 2025-05-01 NOTE — HISTORY OF PRESENT ILLNESS
[FreeTextEntry1] : Patient is 54 year old male presenting for f/u for right heel ulcer down to fat. Patient is in rehab and is non ambulatory and is in wheel chair. Patient relates has not started ambulating yet.

## 2025-05-01 NOTE — PHYSICAL EXAM
[4 x 4] : 4 x 4  [Abdominal Pad] : Abdominal Pad [Normal Breath Sounds] : Normal breath sounds [Normal Heart Sounds] : normal heart sounds [0] : left 0 [Alert] : alert [Oriented to Person] : oriented to person [Oriented to Place] : oriented to place [Oriented to Time] : oriented to time [Calm] : calm [de-identified] : Well-developed, Well-nourished in no acute distress. [de-identified] : 3/5 muscle and tendons crossing the foot and ankle to the right lower extremity, 4/5  muscle strength to the foot and ankle to the left lower extremity  [de-identified] : Right heel ulcer with granulation tissue , mild serous  drainage, no infection, periwound skin is intact with no cellulitis. right heel noted with granular wound base  [FreeTextEntry1] : Right heel - island of epithelized tissue    [FreeTextEntry2] : 2.8 [FreeTextEntry3] : 0.8 [FreeTextEntry4] : 0.1 [de-identified] : Serous/sanguinous [de-identified] : Vee, Adaptic Touch  [de-identified] : Mechanically cleansed with sterile gauze and normal saline. Kerlix   [FreeTextEntry7] : Bilateral buttock- scattered areas of excoriation [FreeTextEntry8] : 0.5 [FreeTextEntry9] : 0.5 [de-identified] : 0.1 [de-identified] : serousanginous  [de-identified] : incontinence dermatitis [de-identified] : hyperpigmentation secondary to moisture exposure, no Signs of fungal infection [de-identified] : dermis  [de-identified] : TRIAD [de-identified] :  Mechanically cleansed with sterile gauze and normal saline 0.9%  [de-identified] : Dorsal foot [de-identified] : 0.8 [de-identified] : 0.5 [de-identified] : 0.1 [de-identified] : serosanguinous  [de-identified] : previous skin discoloration/scab [de-identified] : hyperpigmentation [de-identified] : Vee, Adaptic touch [de-identified] :  Mechanically cleansed with sterile gauze and normal saline 0.9% Kerlix [TWNoteComboBox4] : Small [TWNoteComboBox5] : No [TWNoteComboBox6] : Pressure [de-identified] : No [de-identified] : Normal [de-identified] : None [de-identified] : None [de-identified] : 100% [de-identified] : No [de-identified] : 3x Weekly [de-identified] : Primary Dressing [de-identified] : Small [de-identified] : No [de-identified] : Other [de-identified] : No [de-identified] : other [de-identified] : None [de-identified] : None [de-identified] : No [de-identified] : Daily [de-identified] : Primary Dressing [de-identified] : Right [de-identified] : Small [de-identified] : No [de-identified] : Surgical [de-identified] : No [de-identified] : other [de-identified] : None [de-identified] : None [de-identified] : 100% [de-identified] : No [de-identified] : 3x Weekly [de-identified] : Primary Dressing

## 2025-05-01 NOTE — ASSESSMENT
[Verbal] : Verbal [Written] : Written [Demo] : Demo [Patient] : Patient [Caregiver] : Caregiver [Good - alert, interested, motivated] : Good - alert, interested, motivated [Needs reinforcement] : needs reinforcement [Dressing changes] : dressing changes [Foot Care] : foot care [Skin Care] : skin care [Pressure relief] : pressure relief [Signs and symptoms of infection] : sign and symptoms of infection [Nutrition] : nutrition [How and When to Call] : how and when to call [Pain Management] : pain management [Off-loading] : off-loading [Patient responsibility to plan of care] : patient responsibility to plan of care [] : Yes [Stable] : stable [Other: ____] : [unfilled] [Wheelchair] : Wheelchair [Not Applicable - Long Term Care/Home Health Agency] : Long Term Care/Home Health Agency: Not Applicable [FreeTextEntry2] : Infection prevention Localized wound care  Goal remaining pain free regarding wounds Offloading  Promote optimal skin care and nutrition. [FreeTextEntry4] : Pt to continue with strict heel offloading as well as turning and repositioning at least every 2 hours to alleviate pressure from buttocks area.  Patient verbalized understanding of all discussed. Patient given copy of wound care instructions. Follows up 1 week for right heel and 2 weeks for buttock (nest visit just heel assessment, as both were assessed today)

## 2025-05-01 NOTE — PHYSICAL EXAM
[4 x 4] : 4 x 4  [Abdominal Pad] : Abdominal Pad [Normal Breath Sounds] : Normal breath sounds [Normal Heart Sounds] : normal heart sounds [0] : left 0 [Alert] : alert [Oriented to Person] : oriented to person [Oriented to Place] : oriented to place [Oriented to Time] : oriented to time [Calm] : calm [de-identified] : Well-developed, Well-nourished in no acute distress. [de-identified] : 3/5 muscle and tendons crossing the foot and ankle to the right lower extremity, 4/5  muscle strength to the foot and ankle to the left lower extremity  [de-identified] : Right heel ulcer with granulation tissue , mild serous  drainage, no infection, periwound skin is intact with no cellulitis. right heel noted with granular wound base  [FreeTextEntry1] : Right heel - island of epithelized tissue    [FreeTextEntry2] : 2.8 [FreeTextEntry3] : 0.8 [FreeTextEntry4] : 0.1 [de-identified] : Serous/sanguinous [de-identified] : Vee, Adaptic Touch  [de-identified] : Mechanically cleansed with sterile gauze and normal saline. Kerlix   [FreeTextEntry7] : Bilateral buttock- scattered areas of excoriation [FreeTextEntry8] : 0.5 [FreeTextEntry9] : 0.5 [de-identified] : 0.1 [de-identified] : serousanginous  [de-identified] : incontinence dermatitis [de-identified] : hyperpigmentation secondary to moisture exposure, no Signs of fungal infection [de-identified] : dermis  [de-identified] : TRIAD [de-identified] :  Mechanically cleansed with sterile gauze and normal saline 0.9%  [de-identified] : Dorsal foot [de-identified] : 0.8 [de-identified] : 0.5 [de-identified] : 0.1 [de-identified] : serosanguinous  [de-identified] : previous skin discoloration/scab [de-identified] : hyperpigmentation [de-identified] : Vee, Adaptic touch [de-identified] :  Mechanically cleansed with sterile gauze and normal saline 0.9% Kerlix [TWNoteComboBox4] : Small [TWNoteComboBox5] : No [TWNoteComboBox6] : Pressure [de-identified] : No [de-identified] : Normal [de-identified] : None [de-identified] : None [de-identified] : 100% [de-identified] : No [de-identified] : 3x Weekly [de-identified] : Primary Dressing [de-identified] : Small [de-identified] : No [de-identified] : Other [de-identified] : No [de-identified] : other [de-identified] : None [de-identified] : None [de-identified] : No [de-identified] : Daily [de-identified] : Primary Dressing [de-identified] : Right [de-identified] : Small [de-identified] : No [de-identified] : Surgical [de-identified] : No [de-identified] : other [de-identified] : None [de-identified] : None [de-identified] : 100% [de-identified] : No [de-identified] : 3x Weekly [de-identified] : Primary Dressing

## 2025-05-01 NOTE — VITALS
[Pain related to present condition?] : The patient's  pain is not related to present condition. [] : No [de-identified] : 0/10

## 2025-05-02 DIAGNOSIS — L97.412 NON-PRESSURE CHRONIC ULCER OF RIGHT HEEL AND MIDFOOT WITH FAT LAYER EXPOSED: ICD-10-CM

## 2025-05-02 DIAGNOSIS — K59.00 CONSTIPATION, UNSPECIFIED: ICD-10-CM

## 2025-05-02 DIAGNOSIS — D64.9 ANEMIA, UNSPECIFIED: ICD-10-CM

## 2025-05-02 DIAGNOSIS — M86.171 OTHER ACUTE OSTEOMYELITIS, RIGHT ANKLE AND FOOT: ICD-10-CM

## 2025-05-02 DIAGNOSIS — Z87.440 PERSONAL HISTORY OF URINARY (TRACT) INFECTIONS: ICD-10-CM

## 2025-05-02 DIAGNOSIS — Z89.432 ACQUIRED ABSENCE OF LEFT FOOT: ICD-10-CM

## 2025-05-02 DIAGNOSIS — I25.2 OLD MYOCARDIAL INFARCTION: ICD-10-CM

## 2025-05-02 DIAGNOSIS — E11.69 TYPE 2 DIABETES MELLITUS WITH OTHER SPECIFIED COMPLICATION: ICD-10-CM

## 2025-05-02 DIAGNOSIS — E56.9 VITAMIN DEFICIENCY, UNSPECIFIED: ICD-10-CM

## 2025-05-02 DIAGNOSIS — Z87.891 PERSONAL HISTORY OF NICOTINE DEPENDENCE: ICD-10-CM

## 2025-05-02 DIAGNOSIS — L89.322 PRESSURE ULCER OF LEFT BUTTOCK, STAGE 2: ICD-10-CM

## 2025-05-02 DIAGNOSIS — E87.6 HYPOKALEMIA: ICD-10-CM

## 2025-05-02 DIAGNOSIS — Z86.73 PERSONAL HISTORY OF TRANSIENT ISCHEMIC ATTACK (TIA), AND CEREBRAL INFARCTION WITHOUT RESIDUAL DEFICITS: ICD-10-CM

## 2025-05-02 DIAGNOSIS — M86.671 OTHER CHRONIC OSTEOMYELITIS, RIGHT ANKLE AND FOOT: ICD-10-CM

## 2025-05-02 DIAGNOSIS — Z79.01 LONG TERM (CURRENT) USE OF ANTICOAGULANTS: ICD-10-CM

## 2025-05-02 DIAGNOSIS — Z79.84 LONG TERM (CURRENT) USE OF ORAL HYPOGLYCEMIC DRUGS: ICD-10-CM

## 2025-05-02 DIAGNOSIS — L89.312 PRESSURE ULCER OF RIGHT BUTTOCK, STAGE 2: ICD-10-CM

## 2025-05-02 DIAGNOSIS — Z79.899 OTHER LONG TERM (CURRENT) DRUG THERAPY: ICD-10-CM

## 2025-05-02 DIAGNOSIS — Z91.013 ALLERGY TO SEAFOOD: ICD-10-CM

## 2025-05-02 DIAGNOSIS — E11.40 TYPE 2 DIABETES MELLITUS WITH DIABETIC NEUROPATHY, UNSPECIFIED: ICD-10-CM

## 2025-05-02 DIAGNOSIS — E11.59 TYPE 2 DIABETES MELLITUS WITH OTHER CIRCULATORY COMPLICATIONS: ICD-10-CM

## 2025-05-06 NOTE — DIETITIAN INITIAL EVALUATION ADULT - NUTRITIONGOAL OUTCOME1
Medication Refill Request  Refill request for: mirtazapine (REMERON) 30 MG tablet   Preferred pharmacy verified and refill routed.    Pt to meet >75% of estimated needs via po intake of meals/supplements.

## 2025-05-08 ENCOUNTER — OUTPATIENT (OUTPATIENT)
Dept: OUTPATIENT SERVICES | Facility: HOSPITAL | Age: 55
LOS: 1 days | End: 2025-05-08
Payer: MEDICAID

## 2025-05-08 ENCOUNTER — APPOINTMENT (OUTPATIENT)
Dept: WOUND CARE | Facility: HOSPITAL | Age: 55
End: 2025-05-08
Payer: MEDICAID

## 2025-05-08 VITALS
TEMPERATURE: 98.2 F | BODY MASS INDEX: 28.23 KG/M2 | SYSTOLIC BLOOD PRESSURE: 132 MMHG | DIASTOLIC BLOOD PRESSURE: 76 MMHG | HEART RATE: 67 BPM | RESPIRATION RATE: 18 BRPM | HEIGHT: 74 IN | WEIGHT: 220 LBS | OXYGEN SATURATION: 92 %

## 2025-05-08 DIAGNOSIS — L89.312 PRESSURE ULCER OF RIGHT BUTTOCK, STAGE 2: ICD-10-CM

## 2025-05-08 DIAGNOSIS — E11.59 TYPE 2 DIABETES MELLITUS WITH OTHER CIRCULATORY COMPLICATIONS: ICD-10-CM

## 2025-05-08 DIAGNOSIS — E11.40 TYPE 2 DIABETES MELLITUS WITH DIABETIC NEUROPATHY, UNSPECIFIED: ICD-10-CM

## 2025-05-08 DIAGNOSIS — D64.9 ANEMIA, UNSPECIFIED: ICD-10-CM

## 2025-05-08 DIAGNOSIS — Z86.73 PERSONAL HISTORY OF TRANSIENT ISCHEMIC ATTACK (TIA), AND CEREBRAL INFARCTION WITHOUT RESIDUAL DEFICITS: ICD-10-CM

## 2025-05-08 DIAGNOSIS — E56.9 VITAMIN DEFICIENCY, UNSPECIFIED: ICD-10-CM

## 2025-05-08 DIAGNOSIS — Z79.84 LONG TERM (CURRENT) USE OF ORAL HYPOGLYCEMIC DRUGS: ICD-10-CM

## 2025-05-08 DIAGNOSIS — M86.171 OTHER ACUTE OSTEOMYELITIS, RIGHT ANKLE AND FOOT: ICD-10-CM

## 2025-05-08 DIAGNOSIS — Z87.440 PERSONAL HISTORY OF URINARY (TRACT) INFECTIONS: ICD-10-CM

## 2025-05-08 DIAGNOSIS — L97.412 NON-PRESSURE CHRONIC ULCER OF RIGHT HEEL AND MIDFOOT WITH FAT LAYER EXPOSED: ICD-10-CM

## 2025-05-08 DIAGNOSIS — S98.912A COMPLETE TRAUMATIC AMPUTATION OF LEFT FOOT, LEVEL UNSPECIFIED, INITIAL ENCOUNTER: Chronic | ICD-10-CM

## 2025-05-08 DIAGNOSIS — Z87.891 PERSONAL HISTORY OF NICOTINE DEPENDENCE: ICD-10-CM

## 2025-05-08 DIAGNOSIS — Z89.432 ACQUIRED ABSENCE OF LEFT FOOT: ICD-10-CM

## 2025-05-08 DIAGNOSIS — I25.2 OLD MYOCARDIAL INFARCTION: ICD-10-CM

## 2025-05-08 DIAGNOSIS — Z98.890 OTHER SPECIFIED POSTPROCEDURAL STATES: Chronic | ICD-10-CM

## 2025-05-08 DIAGNOSIS — E87.6 HYPOKALEMIA: ICD-10-CM

## 2025-05-08 DIAGNOSIS — E11.69 TYPE 2 DIABETES MELLITUS WITH OTHER SPECIFIED COMPLICATION: ICD-10-CM

## 2025-05-08 DIAGNOSIS — M86.671 OTHER CHRONIC OSTEOMYELITIS, RIGHT ANKLE AND FOOT: ICD-10-CM

## 2025-05-08 DIAGNOSIS — K25.5 CHRONIC OR UNSPECIFIED GASTRIC ULCER WITH PERFORATION: Chronic | ICD-10-CM

## 2025-05-08 DIAGNOSIS — L22 DIAPER DERMATITIS: ICD-10-CM

## 2025-05-08 DIAGNOSIS — K59.00 CONSTIPATION, UNSPECIFIED: ICD-10-CM

## 2025-05-08 DIAGNOSIS — Z79.899 OTHER LONG TERM (CURRENT) DRUG THERAPY: ICD-10-CM

## 2025-05-08 DIAGNOSIS — Z79.01 LONG TERM (CURRENT) USE OF ANTICOAGULANTS: ICD-10-CM

## 2025-05-08 PROCEDURE — G0463: CPT

## 2025-05-08 PROCEDURE — 99213 OFFICE O/P EST LOW 20 MIN: CPT

## 2025-05-08 NOTE — ASSESSMENT
[Verbal] : Verbal [Written] : Written [Demo] : Demo [Patient] : Patient [Caregiver] : Caregiver [Good - alert, interested, motivated] : Good - alert, interested, motivated [Needs reinforcement] : needs reinforcement [Dressing changes] : dressing changes [Foot Care] : foot care [Skin Care] : skin care [Pressure relief] : pressure relief [Signs and symptoms of infection] : sign and symptoms of infection [Nutrition] : nutrition [How and When to Call] : how and when to call [Pain Management] : pain management [Off-loading] : off-loading [Patient responsibility to plan of care] : patient responsibility to plan of care [] : Yes [Stable] : stable [Other: ____] : [unfilled] [Wheelchair] : Wheelchair [Not Applicable - Long Term Care/Home Health Agency] : Long Term Care/Home Health Agency: Not Applicable [FreeTextEntry2] : Infection prevention Localized wound care  Goal remaining pain free regarding wounds Offloading  Promote optimal skin care and nutrition. [FreeTextEntry4] : Pt to continue with strict heel offloading as well as turning and repositioning at least every 2 hours to alleviate pressure from buttocks area.  Patient verbalized understanding of all discussed. Patient given copy of wound care instructions. Follows up 2 week for right heel and 2 weeks for buttock [FreeTextEntry1] : Right heel ulcer is healing well, no acute infection.

## 2025-05-08 NOTE — VITALS
[Pain related to present condition?] : The patient's  pain is not related to present condition. [] : No [de-identified] : 0/10

## 2025-05-08 NOTE — PLAN
[FreeTextEntry1] : Vee, Dry dressing, return to office in two weeks. 25 minutes spent for patient care and medical decision making.

## 2025-05-08 NOTE — PHYSICAL EXAM
[4 x 4] : 4 x 4  [Abdominal Pad] : Abdominal Pad [Normal Breath Sounds] : Normal breath sounds [Normal Heart Sounds] : normal heart sounds [Alert] : alert [Oriented to Person] : oriented to person [Oriented to Place] : oriented to place [Oriented to Time] : oriented to time [Calm] : calm [de-identified] : Well-developed, Well-nourished in no acute distress. [de-identified] : Within normal limits. [de-identified] : Right heel ulcer is clean, base is red and viable, moderate hyper granulation, no drainage, no odor, no acute infection, periwound skin is intact with no cellulitis. [FreeTextEntry1] : Right heel - island of epithelized tissue    [FreeTextEntry2] : 3.0 [FreeTextEntry3] : 0.8 [FreeTextEntry4] : 0.1 [de-identified] : Serous/sanguinous [de-identified] : Vee, Adaptic Touch  [de-identified] : Mechanically cleansed with sterile gauze and normal saline. Kerlix   [FreeTextEntry7] : Bilateral buttock- scattered areas of excoriation [FreeTextEntry8] : 0.5 [FreeTextEntry9] : 0.5 [de-identified] : 0.1 [de-identified] : serousanginous  [de-identified] : incontinence dermatitis [de-identified] : hyperpigmentation secondary to moisture exposure, no Signs of fungal infection [de-identified] : dermis  [de-identified] : TRIAD [de-identified] :  Mechanically cleansed with sterile gauze and normal saline 0.9%  (NOT ASSESSED THIS WEEK 5/8/25) [de-identified] : Dorsal foot - closed [de-identified] : previous skin discoloration/scab [de-identified] : hyperpigmentation [de-identified] : Dry Dressing [de-identified] :  Mechanically cleansed with sterile gauze and normal saline 0.9% Kerlix [TWNoteComboBox4] : Small [TWNoteComboBox5] : No [TWNoteComboBox6] : Pressure [de-identified] : No [de-identified] : Normal [de-identified] : None [de-identified] : None [de-identified] : 100% [de-identified] : No [de-identified] : 3x Weekly [de-identified] : Primary Dressing [de-identified] : Small [de-identified] : No [de-identified] : Other [de-identified] : No [de-identified] : other [de-identified] : None [de-identified] : None [de-identified] : No [de-identified] : Daily [de-identified] : Primary Dressing [de-identified] : Right [de-identified] : None [de-identified] : No [de-identified] : Surgical [de-identified] : No [de-identified] : other [de-identified] : None [de-identified] : 3x Weekly [de-identified] : Secondary Dressing

## 2025-05-15 ENCOUNTER — APPOINTMENT (OUTPATIENT)
Dept: WOUND CARE | Facility: HOSPITAL | Age: 55
End: 2025-05-15

## 2025-05-22 ENCOUNTER — OUTPATIENT (OUTPATIENT)
Dept: OUTPATIENT SERVICES | Facility: HOSPITAL | Age: 55
LOS: 1 days | End: 2025-05-22
Payer: MEDICAID

## 2025-05-22 ENCOUNTER — APPOINTMENT (OUTPATIENT)
Dept: WOUND CARE | Facility: HOSPITAL | Age: 55
End: 2025-05-22
Payer: MEDICAID

## 2025-05-22 VITALS
WEIGHT: 220 LBS | RESPIRATION RATE: 18 BRPM | HEART RATE: 68 BPM | DIASTOLIC BLOOD PRESSURE: 87 MMHG | HEIGHT: 74 IN | TEMPERATURE: 98.1 F | OXYGEN SATURATION: 93 % | BODY MASS INDEX: 28.23 KG/M2 | SYSTOLIC BLOOD PRESSURE: 147 MMHG

## 2025-05-22 DIAGNOSIS — S98.912A COMPLETE TRAUMATIC AMPUTATION OF LEFT FOOT, LEVEL UNSPECIFIED, INITIAL ENCOUNTER: Chronic | ICD-10-CM

## 2025-05-22 DIAGNOSIS — Z79.899 OTHER LONG TERM (CURRENT) DRUG THERAPY: ICD-10-CM

## 2025-05-22 DIAGNOSIS — Z87.440 PERSONAL HISTORY OF URINARY (TRACT) INFECTIONS: ICD-10-CM

## 2025-05-22 DIAGNOSIS — D64.9 ANEMIA, UNSPECIFIED: ICD-10-CM

## 2025-05-22 DIAGNOSIS — L97.412 NON-PRESSURE CHRONIC ULCER OF RIGHT HEEL AND MIDFOOT WITH FAT LAYER EXPOSED: ICD-10-CM

## 2025-05-22 DIAGNOSIS — E11.69 TYPE 2 DIABETES MELLITUS WITH OTHER SPECIFIED COMPLICATION: ICD-10-CM

## 2025-05-22 DIAGNOSIS — K25.5 CHRONIC OR UNSPECIFIED GASTRIC ULCER WITH PERFORATION: Chronic | ICD-10-CM

## 2025-05-22 DIAGNOSIS — I25.2 OLD MYOCARDIAL INFARCTION: ICD-10-CM

## 2025-05-22 DIAGNOSIS — E56.9 VITAMIN DEFICIENCY, UNSPECIFIED: ICD-10-CM

## 2025-05-22 DIAGNOSIS — Z89.432 ACQUIRED ABSENCE OF LEFT FOOT: ICD-10-CM

## 2025-05-22 DIAGNOSIS — M86.671 OTHER CHRONIC OSTEOMYELITIS, RIGHT ANKLE AND FOOT: ICD-10-CM

## 2025-05-22 DIAGNOSIS — E87.6 HYPOKALEMIA: ICD-10-CM

## 2025-05-22 DIAGNOSIS — M86.171 OTHER ACUTE OSTEOMYELITIS, RIGHT ANKLE AND FOOT: ICD-10-CM

## 2025-05-22 DIAGNOSIS — K59.00 CONSTIPATION, UNSPECIFIED: ICD-10-CM

## 2025-05-22 DIAGNOSIS — Z79.01 LONG TERM (CURRENT) USE OF ANTICOAGULANTS: ICD-10-CM

## 2025-05-22 DIAGNOSIS — L89.312 PRESSURE ULCER OF RIGHT BUTTOCK, STAGE 2: ICD-10-CM

## 2025-05-22 DIAGNOSIS — Z98.890 OTHER SPECIFIED POSTPROCEDURAL STATES: Chronic | ICD-10-CM

## 2025-05-22 DIAGNOSIS — Z87.891 PERSONAL HISTORY OF NICOTINE DEPENDENCE: ICD-10-CM

## 2025-05-22 DIAGNOSIS — Z79.84 LONG TERM (CURRENT) USE OF ORAL HYPOGLYCEMIC DRUGS: ICD-10-CM

## 2025-05-22 DIAGNOSIS — E11.40 TYPE 2 DIABETES MELLITUS WITH DIABETIC NEUROPATHY, UNSPECIFIED: ICD-10-CM

## 2025-05-22 DIAGNOSIS — Z86.73 PERSONAL HISTORY OF TRANSIENT ISCHEMIC ATTACK (TIA), AND CEREBRAL INFARCTION WITHOUT RESIDUAL DEFICITS: ICD-10-CM

## 2025-05-22 DIAGNOSIS — L22 DIAPER DERMATITIS: ICD-10-CM

## 2025-05-22 DIAGNOSIS — E11.59 TYPE 2 DIABETES MELLITUS WITH OTHER CIRCULATORY COMPLICATIONS: ICD-10-CM

## 2025-05-22 DIAGNOSIS — Z91.013 ALLERGY TO SEAFOOD: ICD-10-CM

## 2025-05-22 PROCEDURE — 99214 OFFICE O/P EST MOD 30 MIN: CPT

## 2025-05-22 PROCEDURE — G0463: CPT

## 2025-05-22 NOTE — HISTORY OF PRESENT ILLNESS
[FreeTextEntry1] : Right heel ulcer is clean, improving, no C/O 5/22/25 right heel ulcer smaller, No SOI.right dorsal foot wound dry, No SOI. Bilateral buttock excoriated

## 2025-05-22 NOTE — VITALS
[Pain related to present condition?] : The patient's  pain is not related to present condition. [] : No [de-identified] : 0/10

## 2025-05-22 NOTE — PLAN
[FreeTextEntry1] : right heel ulcer Vee,adaptic, Dry dressing,3X/week right dorsal foot Dry Dressing 3X/week  sacral area triad paste, 3X/week No Cover dressing return to office in two weeks.  30 minutes spent for patient care and medical decision making.

## 2025-05-22 NOTE — PHYSICAL EXAM
Received notification that patient portion has been received.  Waiting on provider portion to be completed and faxed to plan. Refaxed to provider at [phone] for signature.    [4 x 4] : 4 x 4  [Abdominal Pad] : Abdominal Pad [Normal Breath Sounds] : Normal breath sounds [Normal Heart Sounds] : normal heart sounds [Alert] : alert [Oriented to Person] : oriented to person [Oriented to Place] : oriented to place [Oriented to Time] : oriented to time [Calm] : calm [de-identified] : Well-developed, Well-nourished in no acute distress. [de-identified] : Within normal limits. [de-identified] : Right heel ulcer is clean, base is red and viable, moderate hyper granulation, no drainage, no odor, no acute infection, periwound skin is intact with no cellulitis. [FreeTextEntry1] : Right heel - island of epithelized tissue    [FreeTextEntry2] : 3.0 [FreeTextEntry3] : 0.5 [FreeTextEntry4] : 0.1 [de-identified] : Serous/sanguinous [de-identified] : Vee, Adaptic Touch  [de-identified] : Mechanically cleansed with sterile gauze and normal saline. Kerlix   [FreeTextEntry7] : Bilateral buttock- scattered areas of excoriation [FreeTextEntry8] : 0.5 [FreeTextEntry9] : 0.5 [de-identified] : 0.1scattered excoriations [de-identified] : serousanginous  [de-identified] : incontinence dermatitis [de-identified] : hyperpigmentation secondary to moisture exposure, no Signs of fungal infection [de-identified] : dermis  [de-identified] : TRIAD [de-identified] :  Mechanically cleansed with sterile gauze and normal saline 0.9%   [de-identified] : Dorsal foot - closed [de-identified] : 0.2 [de-identified] : 0.2 [de-identified] : 0.1 [de-identified] : serosanguinous [de-identified] : previous skin discoloration/scab [de-identified] : hyperpigmentation [de-identified] :  Mechanically cleansed with sterile gauze and normal saline 0.9% Kerlix [TWNoteComboBox4] : Small [TWNoteComboBox5] : No [TWNoteComboBox6] : Pressure [de-identified] : No [de-identified] : Normal [de-identified] : None [de-identified] : None [de-identified] : 100% [de-identified] : No [de-identified] : 3x Weekly [de-identified] : Primary Dressing [de-identified] : Small [de-identified] : No [de-identified] : Other [de-identified] : No [de-identified] : other [de-identified] : None [de-identified] : None [de-identified] : No [de-identified] : Daily [de-identified] : Primary Dressing [de-identified] : Right [de-identified] : Small [de-identified] : No [de-identified] : Surgical [de-identified] : No [de-identified] : other [de-identified] : None [de-identified] : 3x Weekly [de-identified] : Secondary Dressing

## 2025-05-22 NOTE — ASSESSMENT
[Verbal] : Verbal [Written] : Written [Demo] : Demo [Patient] : Patient [Caregiver] : Caregiver [Good - alert, interested, motivated] : Good - alert, interested, motivated [Needs reinforcement] : needs reinforcement [Dressing changes] : dressing changes [Foot Care] : foot care [Skin Care] : skin care [Pressure relief] : pressure relief [Signs and symptoms of infection] : sign and symptoms of infection [Nutrition] : nutrition [How and When to Call] : how and when to call [Pain Management] : pain management [Off-loading] : off-loading [Patient responsibility to plan of care] : patient responsibility to plan of care [] : Yes [Stable] : stable [Other: ____] : [unfilled] [Wheelchair] : Wheelchair [Not Applicable - Long Term Care/Home Health Agency] : Long Term Care/Home Health Agency: Not Applicable [FreeTextEntry2] : Infection prevention Localized wound care  Goal remaining pain free regarding wounds Offloading  Promote optimal skin care and nutrition. [FreeTextEntry4] : Pt to continue with strict heel offloading as well as turning and repositioning at least every 2 hours to alleviate pressure from buttocks area.  Patient verbalized understanding of all discussed. Patient given copy of wound care instructions. Follows up 2 week for right heel and 2 weeks for buttock

## 2025-05-24 NOTE — ED ADULT NURSE NOTE - RESPIRATORY WDL
Patient seen with Urology PA.   Patient resting comfortably in bed.   CT images reviewed--stents appear to be in good position.   Case discussed. Plan as noted above. Breathing spontaneous and unlabored. Breath sounds clear and equal bilaterally with regular rhythm.

## 2025-05-29 ENCOUNTER — APPOINTMENT (OUTPATIENT)
Dept: WOUND CARE | Facility: HOSPITAL | Age: 55
End: 2025-05-29

## 2025-06-04 ENCOUNTER — OUTPATIENT (OUTPATIENT)
Dept: OUTPATIENT SERVICES | Facility: HOSPITAL | Age: 55
LOS: 1 days | End: 2025-06-04
Payer: MEDICAID

## 2025-06-04 ENCOUNTER — APPOINTMENT (OUTPATIENT)
Dept: WOUND CARE | Facility: HOSPITAL | Age: 55
End: 2025-06-04
Payer: MEDICAID

## 2025-06-04 VITALS
DIASTOLIC BLOOD PRESSURE: 80 MMHG | HEIGHT: 74 IN | BODY MASS INDEX: 28.23 KG/M2 | HEART RATE: 72 BPM | WEIGHT: 220 LBS | RESPIRATION RATE: 18 BRPM | OXYGEN SATURATION: 97 % | TEMPERATURE: 98.1 F | SYSTOLIC BLOOD PRESSURE: 146 MMHG

## 2025-06-04 DIAGNOSIS — Z98.890 OTHER SPECIFIED POSTPROCEDURAL STATES: Chronic | ICD-10-CM

## 2025-06-04 DIAGNOSIS — L89.322 PRESSURE ULCER OF LEFT BUTTOCK, STAGE 2: ICD-10-CM

## 2025-06-04 DIAGNOSIS — L89.312 PRESSURE ULCER OF RIGHT BUTTOCK, STAGE 2: ICD-10-CM

## 2025-06-04 DIAGNOSIS — K25.5 CHRONIC OR UNSPECIFIED GASTRIC ULCER WITH PERFORATION: Chronic | ICD-10-CM

## 2025-06-04 DIAGNOSIS — S98.912A COMPLETE TRAUMATIC AMPUTATION OF LEFT FOOT, LEVEL UNSPECIFIED, INITIAL ENCOUNTER: Chronic | ICD-10-CM

## 2025-06-04 PROCEDURE — 99213 OFFICE O/P EST LOW 20 MIN: CPT

## 2025-06-04 PROCEDURE — G0463: CPT

## 2025-06-04 RX ORDER — CLOTRIMAZOLE AND BETAMETHASONE DIPROPIONATE 10; .5 MG/G; MG/G
1-0.05 CREAM TOPICAL
Qty: 2 | Refills: 4 | Status: ACTIVE | COMMUNITY
Start: 2025-06-04 | End: 1900-01-01

## 2025-06-04 NOTE — ASSESSMENT
[Verbal] : Verbal [Written] : Written [Demo] : Demo [Patient] : Patient [Caregiver] : Caregiver [Good - alert, interested, motivated] : Good - alert, interested, motivated [Needs reinforcement] : needs reinforcement [Dressing changes] : dressing changes [Foot Care] : foot care [Skin Care] : skin care [Pressure relief] : pressure relief [Signs and symptoms of infection] : sign and symptoms of infection [Nutrition] : nutrition [How and When to Call] : how and when to call [Pain Management] : pain management [Off-loading] : off-loading [Patient responsibility to plan of care] : patient responsibility to plan of care [Stable] : stable [Other: ____] : [unfilled] [Wheelchair] : Wheelchair [Not Applicable - Long Term Care/Home Health Agency] : Long Term Care/Home Health Agency: Not Applicable [] : No [FreeTextEntry2] : Infection prevention Localized wound care  Goal remaining pain free regarding wounds Offloading  Promote optimal skin care and nutrition. [FreeTextEntry3] : Erythema worse [FreeTextEntry4] : - MD assessed and advised to change treatment due to redness on bilateral buttocks. - Pt to continue with turning and repositioning at least every 2 hours to alleviate pressure from buttocks.  - Patient verbalized understanding of all discussed. WCC Instructions provided. F/U 2 weeks

## 2025-06-04 NOTE — PHYSICAL EXAM
[4 x 4] : 4 x 4  [Abdominal Pad] : Abdominal Pad [Normal Breath Sounds] : Normal breath sounds [Normal Heart Sounds] : normal heart sounds [Alert] : alert [Oriented to Person] : oriented to person [Oriented to Place] : oriented to place [Oriented to Time] : oriented to time [Calm] : calm [FreeTextEntry2] : 2.4 [FreeTextEntry3] : 0.5 [FreeTextEntry4] : 0.1 [de-identified] : Sanguinous [de-identified] : Vee, Adaptic Touch  [de-identified] : Mechanically cleansed with sterile gauze and 0.9% normal saline. Dry Dressing Abdominal Pad to base of heel Kerlix [de-identified] : Discoloration over vein [de-identified] : No Product [de-identified] : Mechanically cleansed with sterile gauze and 0.9% normal saline. Dry Dressing Abdominal Pad to top of foot Kerlix  **Lotrisone to right lateral foot** [TWNoteComboBox4] : Small [TWNoteComboBox5] : No [TWNoteComboBox6] : Pressure [de-identified] : No [de-identified] : Normal [de-identified] : None [de-identified] : None [de-identified] : 100% [de-identified] : No [de-identified] : 3x Weekly [de-identified] : Primary Dressing [de-identified] : None [de-identified] : No [de-identified] : Pressure [de-identified] : No [de-identified] : other [de-identified] : None [de-identified] : Daily [de-identified] : Well-developed, Well-nourished in no acute distress. [de-identified] : Within normal limits. [de-identified] : Within normal limits. [de-identified] : Right heel ulcer is clean, base is red and viable, moderate hyper granulation, no drainage, no odor, no acute infection, periwound skin is intact with no cellulitis. [FreeTextEntry1] : Right Heel - See DPM Notes [FreeTextEntry7] : Bilateral Buttocks [de-identified] : incontinence dermatitis [de-identified] : Lotrisone [de-identified] : Mechanically cleansed with sterile gauze and normal saline 0.9%   [de-identified] : Right Dorsal Foot - See DPM Notes [de-identified] : None [de-identified] : No [de-identified] : Other [de-identified] : No [de-identified] : Erythema [de-identified] : None [de-identified] : None [de-identified] : No [de-identified] : Every other day [de-identified] : Primary Dressing [de-identified] : Right [de-identified] : Primary Dressing

## 2025-06-04 NOTE — PLAN
[FreeTextEntry1] :  sacral area lotrisone daily No Cover dressing return to office in two weeks.  20 minutes spent for patient care and medical decision making.

## 2025-06-04 NOTE — HISTORY OF PRESENT ILLNESS
[FreeTextEntry1] : Right heel ulcer is clean, improving, no C/O 5/22/25 right heel ulcer smaller, No SOI.right dorsal foot wound dry, No SOI. Bilateral buttock excoriated 6/4/25 bilateral buttock excoriation. No open areas. Erythema worse.

## 2025-06-04 NOTE — PHYSICAL EXAM
[4 x 4] : 4 x 4  [Abdominal Pad] : Abdominal Pad [Normal Breath Sounds] : Normal breath sounds [Normal Heart Sounds] : normal heart sounds [Alert] : alert [Oriented to Person] : oriented to person [Oriented to Place] : oriented to place [Oriented to Time] : oriented to time [Calm] : calm [FreeTextEntry2] : 2.4 [FreeTextEntry3] : 0.5 [FreeTextEntry4] : 0.1 [de-identified] : Sanguinous [de-identified] : Vee, Adaptic Touch  [de-identified] : Mechanically cleansed with sterile gauze and 0.9% normal saline. Dry Dressing Abdominal Pad to base of heel Kerlix [de-identified] : Discoloration over vein [de-identified] : No Product [de-identified] : Mechanically cleansed with sterile gauze and 0.9% normal saline. Dry Dressing Abdominal Pad to top of foot Kerlix  **Lotrisone to right lateral foot** [TWNoteComboBox4] : Small [TWNoteComboBox5] : No [TWNoteComboBox6] : Pressure [de-identified] : No [de-identified] : Normal [de-identified] : None [de-identified] : None [de-identified] : 100% [de-identified] : No [de-identified] : 3x Weekly [de-identified] : Primary Dressing [de-identified] : None [de-identified] : No [de-identified] : Pressure [de-identified] : No [de-identified] : other [de-identified] : None [de-identified] : Daily [de-identified] : Well-developed, Well-nourished in no acute distress. [de-identified] : Within normal limits. [de-identified] : Within normal limits. [de-identified] : Right heel ulcer is clean, base is red and viable, moderate hyper granulation, no drainage, no odor, no acute infection, periwound skin is intact with no cellulitis. [FreeTextEntry1] : Right Heel - See DPM Notes [FreeTextEntry7] : Bilateral Buttocks [de-identified] : incontinence dermatitis [de-identified] : Lotrisone [de-identified] : Mechanically cleansed with sterile gauze and normal saline 0.9%   [de-identified] : Right Dorsal Foot - See DPM Notes [de-identified] : None [de-identified] : No [de-identified] : Other [de-identified] : No [de-identified] : Erythema [de-identified] : None [de-identified] : None [de-identified] : No [de-identified] : Every other day [de-identified] : Primary Dressing [de-identified] : Right [de-identified] : Primary Dressing

## 2025-06-08 NOTE — PLAN
[FreeTextEntry1] : .Patient seen and evaluated. Discussed etiology and treatment plan. Patient verbalized understanding. C/w local wound care with tanvir and c/w offloading. RTc in 2 weeks. . Will encourage patient to start weight bearing once wound has completely healed.  Spent 20 minutes for patient care and medical decision making.

## 2025-06-08 NOTE — HISTORY OF PRESENT ILLNESS
[FreeTextEntry1] : Patient is 54 year old male presenting for f/u for right heel ulcer down to fat- noted with progress of healing . Patient is in rehab and is non ambulatory and is in wheel chair. Patient has been doing physical therapy at rehab.

## 2025-06-08 NOTE — PLAN
[FreeTextEntry1] : .Patient seen and evaluated. Discussed etiology and treatment plan. Patient verbalized understanding. C/w local wound care with atnvir and c/w offloading. RTc in 2 weeks. . Will encourage patient to start weight bearing once wound has completely healed.  Spent 20 minutes for patient care and medical decision making.

## 2025-06-08 NOTE — ASSESSMENT
[FreeTextEntry2] : Infection prevention Localized wound care  Goal remaining pain free regarding wounds Offloading  Promote optimal skin care and nutrition. [FreeTextEntry4] : - DPM assessed and advised heel wound looks great, continue to treat as ordered. Apply Lotrisone to right lateral foot. - Pt on PICC line w/ Zosyn for 1 week for UTI. Pt states PICC line inserted because he's a hard stick. - Pt to continue with strict heel offloading. - Patient verbalized understanding of all discussed. WCC Instructions provided. F/U 2 weeks

## 2025-06-08 NOTE — PHYSICAL EXAM
[Normal Breath Sounds] : Normal breath sounds [Normal Heart Sounds] : normal heart sounds [0] : left 0 [Alert] : alert [Oriented to Person] : oriented to person [Oriented to Place] : oriented to place [Oriented to Time] : oriented to time [Calm] : calm [de-identified] : Well-developed, Well-nourished in no acute distress. [de-identified] : Within normal limits. [de-identified] : 3/5 muscle and tendons crossing the foot and ankle to the right lower extremity, 4/5  muscle strength to the foot and ankle to the left lower extremity  [de-identified] : Right heel ulcer with granulation tissue , mild serous  drainage, no infection, periwound skin is intact with no cellulitis. right heel noted with granular wound base  [FreeTextEntry1] : Right Heel [FreeTextEntry2] : 2.4 [FreeTextEntry3] : 0.5 [FreeTextEntry4] : 0.1 [de-identified] : Sanguinous [de-identified] : Vee, Adaptic Touch  [de-identified] : Mechanically cleansed with sterile gauze and 0.9% normal saline. Dry Dressing Abdominal Pad to base of heel Kerlix [FreeTextEntry7] : Bilateral buttock- See MD Notes [de-identified] : Right Dorsal Foot (Healed) [de-identified] : Discoloration over vein [de-identified] : No Product [de-identified] : Mechanically cleansed with sterile gauze and 0.9% normal saline. Dry Dressing Abdominal Pad to top of foot Kerlix  **Lotrisone to right lateral foot** [TWNoteComboBox4] : Small [TWNoteComboBox5] : No [TWNoteComboBox6] : Pressure [de-identified] : No [de-identified] : Normal [de-identified] : None [de-identified] : None [de-identified] : 100% [de-identified] : No [de-identified] : 3x Weekly [de-identified] : Primary Dressing [de-identified] : False [de-identified] : False [de-identified] : False [de-identified] : False [de-identified] : False [de-identified] : False [de-identified] : False [de-identified] : False [de-identified] : False [de-identified] : False [de-identified] : Right [de-identified] : None [de-identified] : No [de-identified] : Pressure [de-identified] : No [de-identified] : other [de-identified] : None [de-identified] : Daily

## 2025-06-08 NOTE — PHYSICAL EXAM
[Normal Breath Sounds] : Normal breath sounds [Normal Heart Sounds] : normal heart sounds [0] : left 0 [Alert] : alert [Oriented to Person] : oriented to person [Oriented to Place] : oriented to place [Oriented to Time] : oriented to time [Calm] : calm [de-identified] : Well-developed, Well-nourished in no acute distress. [de-identified] : Within normal limits. [de-identified] : 3/5 muscle and tendons crossing the foot and ankle to the right lower extremity, 4/5  muscle strength to the foot and ankle to the left lower extremity  [de-identified] : Right heel ulcer with granulation tissue , mild serous  drainage, no infection, periwound skin is intact with no cellulitis. right heel noted with granular wound base  [FreeTextEntry1] : Right Heel [FreeTextEntry2] : 2.4 [FreeTextEntry3] : 0.5 [FreeTextEntry4] : 0.1 [de-identified] : Sanguinous [de-identified] : Vee, Adaptic Touch  [de-identified] : Mechanically cleansed with sterile gauze and 0.9% normal saline. Dry Dressing Abdominal Pad to base of heel Kerlix [FreeTextEntry7] : Bilateral buttock- See MD Notes [de-identified] : Right Dorsal Foot (Healed) [de-identified] : Discoloration over vein [de-identified] : No Product [de-identified] : Mechanically cleansed with sterile gauze and 0.9% normal saline. Dry Dressing Abdominal Pad to top of foot Kerlix  **Lotrisone to right lateral foot** [TWNoteComboBox4] : Small [TWNoteComboBox5] : No [TWNoteComboBox6] : Pressure [de-identified] : No [de-identified] : Normal [de-identified] : None [de-identified] : None [de-identified] : 100% [de-identified] : No [de-identified] : 3x Weekly [de-identified] : Primary Dressing [de-identified] : False [de-identified] : False [de-identified] : False [de-identified] : False [de-identified] : False [de-identified] : False [de-identified] : False [de-identified] : False [de-identified] : False [de-identified] : False [de-identified] : Right [de-identified] : None [de-identified] : No [de-identified] : Pressure [de-identified] : No [de-identified] : other [de-identified] : None [de-identified] : Daily

## 2025-06-09 DIAGNOSIS — Z87.440 PERSONAL HISTORY OF URINARY (TRACT) INFECTIONS: ICD-10-CM

## 2025-06-09 DIAGNOSIS — E87.6 HYPOKALEMIA: ICD-10-CM

## 2025-06-09 DIAGNOSIS — Z87.891 PERSONAL HISTORY OF NICOTINE DEPENDENCE: ICD-10-CM

## 2025-06-09 DIAGNOSIS — E56.9 VITAMIN DEFICIENCY, UNSPECIFIED: ICD-10-CM

## 2025-06-09 DIAGNOSIS — E11.69 TYPE 2 DIABETES MELLITUS WITH OTHER SPECIFIED COMPLICATION: ICD-10-CM

## 2025-06-09 DIAGNOSIS — E11.59 TYPE 2 DIABETES MELLITUS WITH OTHER CIRCULATORY COMPLICATIONS: ICD-10-CM

## 2025-06-09 DIAGNOSIS — L89.322 PRESSURE ULCER OF LEFT BUTTOCK, STAGE 2: ICD-10-CM

## 2025-06-09 DIAGNOSIS — D64.9 ANEMIA, UNSPECIFIED: ICD-10-CM

## 2025-06-09 DIAGNOSIS — M86.671 OTHER CHRONIC OSTEOMYELITIS, RIGHT ANKLE AND FOOT: ICD-10-CM

## 2025-06-09 DIAGNOSIS — Z79.01 LONG TERM (CURRENT) USE OF ANTICOAGULANTS: ICD-10-CM

## 2025-06-09 DIAGNOSIS — Z79.84 LONG TERM (CURRENT) USE OF ORAL HYPOGLYCEMIC DRUGS: ICD-10-CM

## 2025-06-09 DIAGNOSIS — E11.40 TYPE 2 DIABETES MELLITUS WITH DIABETIC NEUROPATHY, UNSPECIFIED: ICD-10-CM

## 2025-06-09 DIAGNOSIS — Z79.899 OTHER LONG TERM (CURRENT) DRUG THERAPY: ICD-10-CM

## 2025-06-09 DIAGNOSIS — K59.00 CONSTIPATION, UNSPECIFIED: ICD-10-CM

## 2025-06-09 DIAGNOSIS — Z86.73 PERSONAL HISTORY OF TRANSIENT ISCHEMIC ATTACK (TIA), AND CEREBRAL INFARCTION WITHOUT RESIDUAL DEFICITS: ICD-10-CM

## 2025-06-09 DIAGNOSIS — M86.171 OTHER ACUTE OSTEOMYELITIS, RIGHT ANKLE AND FOOT: ICD-10-CM

## 2025-06-09 DIAGNOSIS — L97.412 NON-PRESSURE CHRONIC ULCER OF RIGHT HEEL AND MIDFOOT WITH FAT LAYER EXPOSED: ICD-10-CM

## 2025-06-09 DIAGNOSIS — I25.2 OLD MYOCARDIAL INFARCTION: ICD-10-CM

## 2025-06-09 DIAGNOSIS — Z89.432 ACQUIRED ABSENCE OF LEFT FOOT: ICD-10-CM

## 2025-06-10 NOTE — ED ADULT NURSE NOTE - URINE COLOR
Chief Complaint   Patient presents with    Follow-Up     SLEEP - ESTABLISHED PT CHART PREP COMPLETED ON 06/03/2025     Setup date: 04/17/2020    Last Office Visit 04/27/2023 with Isra Albert    DME: [facility]    PAP/O2/OAT: BiPAP ST 14/8    Wireless Data? YES ResMed        HPI:  Lencho Parker is a 80 y.o. year old male here today for follow-up on CSA. He is a former smoker with a 7.5-pack-year history quit in 2004.  Past medical history includes central sleep apnea, systolic heart failure, AICD, PVD, cardiomyopathy, TIA, hypertension, AAA, chronic kidney disease, PFD, COPD, polycythemia who presented [facility] for a regular follow up.     Patient is using a fullface mask and denies any difficulty with mask fit or pressures.  He uses his BiPAP ST for 8 to 10 hours nightly.  He denies any difficulty falling asleep or staying asleep.  Compliance was unavailable, therefore we reviewed data on my air hi.  Patient is eligible for replacement device.  Device is currently greater than 5 years old.  Request for replacement.    30-day compliance shows BiPAP ST settings 14/8 cm/H2O with PVR 12 bpm.  83% use with an average time of 6 hours and 58 minutes and a residual AHI of 2.2.  No evidence of mask leak.     Sleep History:  PSG split-night 3/12/2020 indicated severe sleep apnea with an overall AHI 54.5/h and an O2 mauricio of 82%.  Patient met split-night criteria and ultimately did best on BiPAP ST 14/8 cm with a backup rate of 12 breaths/min.  AHI reduced to 0/h with a mean SPO2 91%. .     Sleep Study History:   PSG split-night 3/12/2020 indicated severe sleep apnea with an overall AHI 54.5/h and an O2 mauricio of 82%.  Patient met split-night criteria and ultimately did best on BiPAP ST 14/8 cm with a backup rate of 12 breaths/min.  AHI reduced to 0/h with a mean SPO2 91%.       ROS: As per HPI and otherwise negative if not stated.    Past Medical History[1]    Past Surgical History[2]    Family  "History   Problem Relation Age of Onset    Cancer Mother     Cancer Sister        Allergies as of 06/10/2025 - Reviewed 06/10/2025   Allergen Reaction Noted    Entresto [sacubitril-valsartan]  06/01/2020    Crestor [rosuvastatin]  01/06/2023        Vitals:  BP (!) 86/64 (BP Location: Left arm, Patient Position: Sitting, BP Cuff Size: Adult)   Pulse 87   Resp 16   Ht 1.88 m (6' 2\")   Wt 78.9 kg (174 lb)   SpO2 99%     Current medications as of today Current Medications[3]      Physical Exam:   Gen:           Alert and oriented, No apparent distress. Mood and affect appropriate, normal interaction with examiner.  Eyes:          PERRL, EOM intact, sclere white, conjunctive moist.  Ears:          Not examined.   Hearing:     Grossly intact.  Nose:          Normal, no lesions or deformities.  Dentition:    Good dentition.  Oropharynx:   Tongue normal, posterior pharynx without erythema or exudate.  Neck:        Supple, trachea midline, no masses.  Respiratory Effort: No intercostal retractions or use of accessory muscles.   Lung Auscultation:      Clear to auscultation bilaterally; no rales, rhonchi or wheezing.  CV:            Regular rate and rhythm. No murmurs, rubs or gallops.  Abd:           Not examined.   Lymphadenopathy: Not examined.  Gait and Station: Normal.  Digits and Nails: No clubbing, cyanosis, petechiae, or nodes.   Cranial Nerves: II-XII grossly intact.  Skin:        No rashes, lesions or ulcers noted.               Ext:           No cyanosis or edema.      Assessment:  1. HARPAL (obstructive sleep apnea)  DME BiPAP            Plan:  Using and benefiting from BiPAP ST.  Compliance shows adequate use and control of HARPAL.  Order placed for replacement device as per patient request.  Current device beginning to malfunction and leak profusely.  Advised patient to follow-up within 90 days on new device.  Can be seen sooner if needed.  Mask and supply order also placed.  New DME company [facility].    Please " note that this dictation was created using voice recognition software. I have made every reasonable attempt to correct obvious errors, but it is possible there are errors of grammar and possibly content that I did not discover before finalizing the note.         [1]   Past Medical History:  Diagnosis Date    Breath shortness     on exertion    Cardiomyopathy (HCC) 05/2021    Echocardiogram with moderately dilated LV, mild concentric LVH, LVEF 20%. Global hypokinesis. Normal RA and RV. Severely dilated LA. Mild MR, mild TR. RVSP 34mmHg.    Serbian measles     Heart attack (HCC)     hx 2007    History of abdominal aortic aneurysm (AAA) 2009    Status post stent    Hyperlipidemia     Hypertension     Mumps     Pacemaker     AICD    Paroxysmal atrial fibrillation (HCC)     Status post Watchman procedure in [place].    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     Sleep apnea     Snoring     Stroke (HCC) 2012    TIA    Tonsillitis     Ventricular tachycardia (HCC)    [2]   Past Surgical History:  Procedure Laterality Date    AICD BATTERY CHANGE Left 09/10/2020     Upgrade to Chain MRI Quad CRT-D [id number] implanted by Dr. Barrios.    AICD BATTERY CHANGE Left 01/03/2017    Generator replacement with [employer] the [employer] MRI XT  [id number] implanted in [place].    OTHER CARDIAC SURGERY  2014    watchman device    AAA WITH STENT GRAFT  2009    OTHER ORTHOPEDIC SURGERY  2005    hip surgery    OTHER  2000    kidney removed    OTHER CARDIAC SURGERY  1991    AICD implanted   [3]   Current Outpatient Medications   Medication Sig Dispense Refill    VERQUVO 2.5 MG Tab Take 1 Tablet by mouth every day. 90 Tablet 3    Empagliflozin (JARDIANCE) 25 MG Tab Take 12.5 mg by mouth every day. 45 Tablet 3    albuterol 108 (90 Base) MCG/ACT Aero Soln inhalation aerosol Inhale 2 Puffs every four hours as needed for Shortness of Breath.      carvedilol (COREG) 12.5 MG Tab Take 6.25-12.5 mg by mouth 2 times a day with meals. 12.5 mg in the AM  6.25  mg in the PM      apixaban (ELIQUIS) 5mg Tab Take 2.5 mg by mouth 2 times a day.      atorvastatin (LIPITOR) 40 MG Tab Take 40 mg by mouth every day.      allopurinol (ZYLOPRIM) 100 MG Tab Take 100 mg by mouth every day.      spironolactone (ALDACTONE) 25 MG Tab Take 0.5 Tablets by mouth every day. 45 Tablet 3    losartan (COZAAR) 100 MG Tab Take 1 Tablet by mouth every day. 90 Each 4    amiodarone (CORDARONE) 200 MG Tab Take 1 Tablet by mouth every day. (Patient taking differently: Take 300 mg by mouth every day.) 90 Tablet 4    vitamin D3 (CHOLECALCIFEROL) 1000 Unit (25 mcg) Tab Take 1,000 Units by mouth every day.      furosemide (LASIX) 20 MG Tab Take 20 mg by mouth every day.      Multiple Vitamins-Minerals (MULTIVITAMIN ADULT EXTRA C PO) Take 1 Tablet by mouth every day.      tamsulosin (FLOMAX) 0.4 MG capsule Take 0.8 mg by mouth every day.      cinacalcet (SENSIPAR) 30 MG Tab Take 30 mg by mouth every day.      Omega-3 Fatty Acids (FISH OIL) 1000 MG Cap capsule Take 1,000 mg by mouth every day.       No current facility-administered medications for this visit.      in jacques bag/yellow

## 2025-06-18 ENCOUNTER — OUTPATIENT (OUTPATIENT)
Dept: OUTPATIENT SERVICES | Facility: HOSPITAL | Age: 55
LOS: 1 days | End: 2025-06-18
Payer: MEDICAID

## 2025-06-18 ENCOUNTER — APPOINTMENT (OUTPATIENT)
Dept: WOUND CARE | Facility: HOSPITAL | Age: 55
End: 2025-06-18
Payer: MEDICAID

## 2025-06-18 VITALS
HEIGHT: 74 IN | WEIGHT: 220 LBS | DIASTOLIC BLOOD PRESSURE: 71 MMHG | SYSTOLIC BLOOD PRESSURE: 130 MMHG | OXYGEN SATURATION: 91 % | BODY MASS INDEX: 28.23 KG/M2 | RESPIRATION RATE: 18 BRPM | TEMPERATURE: 98.7 F | HEART RATE: 52 BPM

## 2025-06-18 DIAGNOSIS — Z79.899 OTHER LONG TERM (CURRENT) DRUG THERAPY: ICD-10-CM

## 2025-06-18 DIAGNOSIS — Z86.73 PERSONAL HISTORY OF TRANSIENT ISCHEMIC ATTACK (TIA), AND CEREBRAL INFARCTION WITHOUT RESIDUAL DEFICITS: ICD-10-CM

## 2025-06-18 DIAGNOSIS — Z89.432 ACQUIRED ABSENCE OF LEFT FOOT: ICD-10-CM

## 2025-06-18 DIAGNOSIS — S98.912A COMPLETE TRAUMATIC AMPUTATION OF LEFT FOOT, LEVEL UNSPECIFIED, INITIAL ENCOUNTER: Chronic | ICD-10-CM

## 2025-06-18 DIAGNOSIS — Z91.013 ALLERGY TO SEAFOOD: ICD-10-CM

## 2025-06-18 DIAGNOSIS — Z79.01 LONG TERM (CURRENT) USE OF ANTICOAGULANTS: ICD-10-CM

## 2025-06-18 DIAGNOSIS — Z79.84 LONG TERM (CURRENT) USE OF ORAL HYPOGLYCEMIC DRUGS: ICD-10-CM

## 2025-06-18 DIAGNOSIS — Z98.890 OTHER SPECIFIED POSTPROCEDURAL STATES: Chronic | ICD-10-CM

## 2025-06-18 DIAGNOSIS — Z87.440 PERSONAL HISTORY OF URINARY (TRACT) INFECTIONS: ICD-10-CM

## 2025-06-18 DIAGNOSIS — K59.00 CONSTIPATION, UNSPECIFIED: ICD-10-CM

## 2025-06-18 DIAGNOSIS — M86.171 OTHER ACUTE OSTEOMYELITIS, RIGHT ANKLE AND FOOT: ICD-10-CM

## 2025-06-18 DIAGNOSIS — Z99.3 DEPENDENCE ON WHEELCHAIR: ICD-10-CM

## 2025-06-18 DIAGNOSIS — L89.322 PRESSURE ULCER OF LEFT BUTTOCK, STAGE 2: ICD-10-CM

## 2025-06-18 DIAGNOSIS — E11.69 TYPE 2 DIABETES MELLITUS WITH OTHER SPECIFIED COMPLICATION: ICD-10-CM

## 2025-06-18 DIAGNOSIS — E11.40 TYPE 2 DIABETES MELLITUS WITH DIABETIC NEUROPATHY, UNSPECIFIED: ICD-10-CM

## 2025-06-18 DIAGNOSIS — I25.2 OLD MYOCARDIAL INFARCTION: ICD-10-CM

## 2025-06-18 DIAGNOSIS — E87.6 HYPOKALEMIA: ICD-10-CM

## 2025-06-18 DIAGNOSIS — L97.412 NON-PRESSURE CHRONIC ULCER OF RIGHT HEEL AND MIDFOOT WITH FAT LAYER EXPOSED: ICD-10-CM

## 2025-06-18 DIAGNOSIS — M86.671 OTHER CHRONIC OSTEOMYELITIS, RIGHT ANKLE AND FOOT: ICD-10-CM

## 2025-06-18 DIAGNOSIS — E56.9 VITAMIN DEFICIENCY, UNSPECIFIED: ICD-10-CM

## 2025-06-18 DIAGNOSIS — L89.312 PRESSURE ULCER OF RIGHT BUTTOCK, STAGE 2: ICD-10-CM

## 2025-06-18 DIAGNOSIS — E11.59 TYPE 2 DIABETES MELLITUS WITH OTHER CIRCULATORY COMPLICATIONS: ICD-10-CM

## 2025-06-18 DIAGNOSIS — D64.9 ANEMIA, UNSPECIFIED: ICD-10-CM

## 2025-06-18 DIAGNOSIS — K25.5 CHRONIC OR UNSPECIFIED GASTRIC ULCER WITH PERFORATION: Chronic | ICD-10-CM

## 2025-06-18 DIAGNOSIS — Z87.891 PERSONAL HISTORY OF NICOTINE DEPENDENCE: ICD-10-CM

## 2025-06-18 PROCEDURE — 99213 OFFICE O/P EST LOW 20 MIN: CPT

## 2025-06-18 PROCEDURE — G0463: CPT

## 2025-06-18 NOTE — PHYSICAL EXAM
[4 x 4] : 4 x 4  [Abdominal Pad] : Abdominal Pad [Normal Breath Sounds] : Normal breath sounds [Normal Heart Sounds] : normal heart sounds [0] : left 0 [Alert] : alert [Oriented to Person] : oriented to person [Oriented to Place] : oriented to place [Oriented to Time] : oriented to time [Calm] : calm [de-identified] : Well-developed, Well-nourished in no acute distress. [de-identified] : Within normal limits. [de-identified] : 3/5 muscle and tendons crossing the foot and ankle to the right lower extremity, 4/5  muscle strength to the foot and ankle to the left lower extremity  [de-identified] : Right heel ulcer with granulation tissue , mild serous  drainage, no infection, periwound skin is intact with no cellulitis. right heel noted with granular wound base  [FreeTextEntry1] : Right Heel [FreeTextEntry2] : 2.4 [FreeTextEntry3] : 0.5 [FreeTextEntry4] : 0.1 [de-identified] : Sanguinous [de-identified] : Vee, Adaptic Touch  [de-identified] : Mechanically cleansed with sterile gauze and 0.9% normal saline. Dry Dressing Abdominal Pad to base of heel Kerlix [FreeTextEntry7] : Bilateral buttock- See MD Notes [de-identified] : Right Dorsal Foot (Healed) [de-identified] : Discoloration over vein [de-identified] : No Product [de-identified] : Mechanically cleansed with sterile gauze and 0.9% normal saline. Dry Dressing Abdominal Pad to top of foot Kerlix  **Lotrisone to right lateral foot** [TWNoteComboBox4] : Small [TWNoteComboBox5] : No [TWNoteComboBox6] : Pressure [de-identified] : No [de-identified] : Normal [de-identified] : None [de-identified] : None [de-identified] : 100% [de-identified] : No [de-identified] : 3x Weekly [de-identified] : Primary Dressing [de-identified] : False [de-identified] : False [de-identified] : False [de-identified] : False [de-identified] : False [de-identified] : False [de-identified] : False [de-identified] : False [de-identified] : False [de-identified] : False [de-identified] : Right [de-identified] : None [de-identified] : No [de-identified] : Pressure [de-identified] : No [de-identified] : other [de-identified] : None [de-identified] : Daily

## 2025-06-18 NOTE — ASSESSMENT
[Verbal] : Verbal [Written] : Written [Demo] : Demo [Patient] : Patient [Caregiver] : Caregiver [Good - alert, interested, motivated] : Good - alert, interested, motivated [Needs reinforcement] : needs reinforcement [Dressing changes] : dressing changes [Foot Care] : foot care [Skin Care] : skin care [Pressure relief] : pressure relief [Signs and symptoms of infection] : sign and symptoms of infection [Nutrition] : nutrition [How and When to Call] : how and when to call [Pain Management] : pain management [Off-loading] : off-loading [Patient responsibility to plan of care] : patient responsibility to plan of care [] : Yes [Stable] : stable [Other: ____] : [unfilled] [Wheelchair] : Wheelchair [Not Applicable - Long Term Care/Home Health Agency] : Long Term Care/Home Health Agency: Not Applicable [FreeTextEntry2] : Infection prevention Localized wound care  Goal remaining pain free regarding wounds Offloading  Promote optimal skin care and nutrition. [FreeTextEntry4] : - DPM assessed and advised heel wound looks great, continue to treat as ordered. Apply Lotrisone to right lateral foot. - Pt on PICC line w/ Zosyn for 1 week for UTI. Pt states PICC line inserted because he's a hard stick. - Pt to continue with strict heel offloading. - Patient verbalized understanding of all discussed. WCC Instructions provided. F/U 2 weeks

## 2025-06-18 NOTE — PLAN
[FreeTextEntry1] : sacral area lotrisone then Triad,daily No Cover dressing return to office in two weeks.  20 minutes spent for patient care and medical decision making.

## 2025-06-18 NOTE — PLAN
[FreeTextEntry1] : .Patient seen and evaluated. Discussed etiology and treatment plan. Patient verbalized understanding. C/w local wound care with tanvir and c/w offloading. RTc in 2 weeks. . Will encourage patient to start weight bearing once wound has completely healed. Patient is happy with the progress. Spent 20 minutes for patient care and medical decision making.

## 2025-06-18 NOTE — ASSESSMENT
[Verbal] : Verbal [Written] : Written [Demo] : Demo [Patient] : Patient [Caregiver] : Caregiver [Good - alert, interested, motivated] : Good - alert, interested, motivated [Needs reinforcement] : needs reinforcement [Dressing changes] : dressing changes [Foot Care] : foot care [Skin Care] : skin care [Pressure relief] : pressure relief [Signs and symptoms of infection] : sign and symptoms of infection [Nutrition] : nutrition [How and When to Call] : how and when to call [Pain Management] : pain management [Off-loading] : off-loading [Patient responsibility to plan of care] : patient responsibility to plan of care [] : Yes [FreeTextEntry2] : Infection prevention Localized wound care  Goal remaining pain free regarding wounds Offloading  Promote optimal skin care and nutrition. [FreeTextEntry3] : Erythema improved [FreeTextEntry4] : Pt to continue with turning and repositioning at least every 2 hours to alleviate pressure from buttocks.  Patient verbalized understanding of all discussed. WCC Instructions provided. F/U 2 weeks [Stable] : stable [Other: ____] : [unfilled] [Wheelchair] : Wheelchair [Not Applicable - Long Term Care/Home Health Agency] : Long Term Care/Home Health Agency: Not Applicable

## 2025-06-18 NOTE — VITALS
[Pain related to present condition?] : The patient's  pain is not related to present condition. [] : No [de-identified] : 0/10

## 2025-06-18 NOTE — PHYSICAL EXAM
[Normal Breath Sounds] : Normal breath sounds [Normal Heart Sounds] : normal heart sounds [Alert] : alert [Oriented to Person] : oriented to person [Oriented to Place] : oriented to place [Oriented to Time] : oriented to time [Calm] : calm [de-identified] : Well-developed, Well-nourished in no acute distress. [de-identified] : Within normal limits. [de-identified] : Within normal limits. [de-identified] : Right heel ulcer is clean, base is red and viable, moderate hyper granulation, no drainage, no odor, no acute infection, periwound skin is intact with no cellulitis. [FreeTextEntry1] : Right Heel - See DPM Notes [FreeTextEntry7] : Bilateral Buttocks [de-identified] : incontinence dermatitis [de-identified] : Lotrisone, Triad Cream [de-identified] : Mechanically cleansed with sterile gauze and normal saline 0.9%   [de-identified] : Right Dorsal Foot - See DPM Notes [de-identified] : None [de-identified] : No [de-identified] : Other [de-identified] : No [de-identified] : Erythema [de-identified] : None [de-identified] : None [de-identified] : No [de-identified] : Every other day [de-identified] : Primary Dressing [de-identified] : Right

## 2025-06-18 NOTE — VITALS
[Pain related to present condition?] : The patient's  pain is not related to present condition. [] : No [de-identified] : 0/10

## 2025-06-18 NOTE — HISTORY OF PRESENT ILLNESS
[FreeTextEntry1] : Patient is 54 year old male presenting for f/u for right heel ulcer down to fat- noted with progress of healing . Patient is in rehab and is non ambulatory and is in wheel chair. Patient has been doing physical therapy at rehab.   6/18/25 Patient presenting for f/u for right heel wound down to fat - noted with progress of healing.

## 2025-06-18 NOTE — HISTORY OF PRESENT ILLNESS
[FreeTextEntry1] : Right heel ulcer is clean, improving, no C/O 5/22/25 right heel ulcer smaller, No SOI.right dorsal foot wound dry, No SOI. Bilateral buttock excoriated 6/4/25 bilateral buttock excoriation. No open areas. Erythema worse. 6/18/25 buttock has improved with lotrisone. No SOI

## 2025-07-02 ENCOUNTER — APPOINTMENT (OUTPATIENT)
Dept: WOUND CARE | Facility: HOSPITAL | Age: 55
End: 2025-07-02
Payer: MEDICAID

## 2025-07-02 ENCOUNTER — OUTPATIENT (OUTPATIENT)
Dept: OUTPATIENT SERVICES | Facility: HOSPITAL | Age: 55
LOS: 1 days | End: 2025-07-02
Payer: MEDICAID

## 2025-07-02 ENCOUNTER — APPOINTMENT (OUTPATIENT)
Dept: WOUND CARE | Facility: HOSPITAL | Age: 55
End: 2025-07-02

## 2025-07-02 VITALS
HEIGHT: 74 IN | TEMPERATURE: 99 F | OXYGEN SATURATION: 92 % | BODY MASS INDEX: 28.23 KG/M2 | DIASTOLIC BLOOD PRESSURE: 79 MMHG | RESPIRATION RATE: 18 BRPM | WEIGHT: 220 LBS | HEART RATE: 57 BPM | SYSTOLIC BLOOD PRESSURE: 133 MMHG

## 2025-07-02 DIAGNOSIS — E56.9 VITAMIN DEFICIENCY, UNSPECIFIED: ICD-10-CM

## 2025-07-02 DIAGNOSIS — Z79.899 OTHER LONG TERM (CURRENT) DRUG THERAPY: ICD-10-CM

## 2025-07-02 DIAGNOSIS — E11.40 TYPE 2 DIABETES MELLITUS WITH DIABETIC NEUROPATHY, UNSPECIFIED: ICD-10-CM

## 2025-07-02 DIAGNOSIS — Z87.891 PERSONAL HISTORY OF NICOTINE DEPENDENCE: ICD-10-CM

## 2025-07-02 DIAGNOSIS — Z86.73 PERSONAL HISTORY OF TRANSIENT ISCHEMIC ATTACK (TIA), AND CEREBRAL INFARCTION WITHOUT RESIDUAL DEFICITS: ICD-10-CM

## 2025-07-02 DIAGNOSIS — L89.322 PRESSURE ULCER OF LEFT BUTTOCK, STAGE 2: ICD-10-CM

## 2025-07-02 DIAGNOSIS — I25.2 OLD MYOCARDIAL INFARCTION: ICD-10-CM

## 2025-07-02 DIAGNOSIS — K59.00 CONSTIPATION, UNSPECIFIED: ICD-10-CM

## 2025-07-02 DIAGNOSIS — E11.59 TYPE 2 DIABETES MELLITUS WITH OTHER CIRCULATORY COMPLICATIONS: ICD-10-CM

## 2025-07-02 DIAGNOSIS — D64.9 ANEMIA, UNSPECIFIED: ICD-10-CM

## 2025-07-02 DIAGNOSIS — S98.912A COMPLETE TRAUMATIC AMPUTATION OF LEFT FOOT, LEVEL UNSPECIFIED, INITIAL ENCOUNTER: Chronic | ICD-10-CM

## 2025-07-02 DIAGNOSIS — K25.5 CHRONIC OR UNSPECIFIED GASTRIC ULCER WITH PERFORATION: Chronic | ICD-10-CM

## 2025-07-02 DIAGNOSIS — Z89.432 ACQUIRED ABSENCE OF LEFT FOOT: ICD-10-CM

## 2025-07-02 DIAGNOSIS — Z79.01 LONG TERM (CURRENT) USE OF ANTICOAGULANTS: ICD-10-CM

## 2025-07-02 DIAGNOSIS — Z79.84 LONG TERM (CURRENT) USE OF ORAL HYPOGLYCEMIC DRUGS: ICD-10-CM

## 2025-07-02 DIAGNOSIS — Z87.440 PERSONAL HISTORY OF URINARY (TRACT) INFECTIONS: ICD-10-CM

## 2025-07-02 DIAGNOSIS — E87.6 HYPOKALEMIA: ICD-10-CM

## 2025-07-02 DIAGNOSIS — Z98.890 OTHER SPECIFIED POSTPROCEDURAL STATES: Chronic | ICD-10-CM

## 2025-07-02 PROCEDURE — 99213 OFFICE O/P EST LOW 20 MIN: CPT

## 2025-07-02 PROCEDURE — G0463: CPT

## 2025-07-02 NOTE — HISTORY OF PRESENT ILLNESS
[FreeTextEntry1] : Right heel ulcer is clean, improving, no C/O 5/22/25 right heel ulcer smaller, No SOI.right dorsal foot wound dry, No SOI. Bilateral buttock excoriated 6/4/25 bilateral buttock excoriation. No open areas. Erythema worse. 6/18/25 buttock has improved with lotrisone. No SOI 7/2/25 both buttock have improved with the triad/lotrisone combo. No SOI. Patient notes less discomfort

## 2025-07-02 NOTE — ASSESSMENT
[Verbal] : Verbal [Written] : Written [Demo] : Demo [Patient] : Patient [Caregiver] : Caregiver [Good - alert, interested, motivated] : Good - alert, interested, motivated [Verbalizes knowledge/Understanding] : Verbalizes knowledge/understanding [Dressing changes] : dressing changes [Foot Care] : foot care [Skin Care] : skin care [Pressure relief] : pressure relief [Signs and symptoms of infection] : sign and symptoms of infection [Nutrition] : nutrition [How and When to Call] : how and when to call [Pain Management] : pain management [Off-loading] : off-loading [Patient responsibility to plan of care] : patient responsibility to plan of care [] : Yes [Stable] : stable [Other: ____] : [unfilled] [Wheelchair] : Wheelchair [Not Applicable - Long Term Care/Home Health Agency] : Long Term Care/Home Health Agency: Not Applicable [FreeTextEntry2] : Infection prevention Localized wound care  Goal remaining pain free regarding wounds Offloading  Promote optimal skin care and nutrition. [FreeTextEntry4] : Pt to continue with turning and repositioning at least every 2 hours to alleviate pressure from buttocks.  Patient verbalized understanding of all discussed. WCC Instructions provided. F/U 2 weeks

## 2025-07-02 NOTE — VITALS
[Pain related to present condition?] : The patient's  pain is not related to present condition. [] : No [de-identified] : 0/10

## 2025-07-02 NOTE — PHYSICAL EXAM
[Normal Breath Sounds] : Normal breath sounds [Normal Heart Sounds] : normal heart sounds [Alert] : alert [Oriented to Person] : oriented to person [Oriented to Place] : oriented to place [Oriented to Time] : oriented to time [Calm] : calm [de-identified] : Well-developed, Well-nourished in no acute distress. [de-identified] : Within normal limits. [de-identified] : Within normal limits. [de-identified] : Right heel ulcer is clean, base is red and viable, moderate hyper granulation, no drainage, no odor, no acute infection, periwound skin is intact with no cellulitis. [FreeTextEntry1] : Right Heel - See DPM Notes [FreeTextEntry7] : Bilateral Buttocks [de-identified] : incontinence dermatitis [de-identified] : Lotrisone, Triad Cream [de-identified] : Mechanically cleansed with sterile gauze and normal saline 0.9%   [de-identified] : Right Dorsal Foot - See DPM Notes [de-identified] : None [de-identified] : No [de-identified] : Other [de-identified] : No [de-identified] : Erythema [de-identified] : None [de-identified] : None [de-identified] : No [de-identified] : Every other day [de-identified] : Primary Dressing [de-identified] : Right

## 2025-07-02 NOTE — PLAN
[FreeTextEntry1] : sacral /buttock area lotrisone then Triad,daily No Cover dressing return to office in two weeks.  20 minutes spent for patient care and medical decision making.

## 2025-07-16 ENCOUNTER — OUTPATIENT (OUTPATIENT)
Dept: OUTPATIENT SERVICES | Facility: HOSPITAL | Age: 55
LOS: 1 days | End: 2025-07-16
Payer: MEDICAID

## 2025-07-16 ENCOUNTER — APPOINTMENT (OUTPATIENT)
Dept: WOUND CARE | Facility: HOSPITAL | Age: 55
End: 2025-07-16
Payer: MEDICAID

## 2025-07-16 VITALS
HEIGHT: 74 IN | DIASTOLIC BLOOD PRESSURE: 77 MMHG | OXYGEN SATURATION: 94 % | SYSTOLIC BLOOD PRESSURE: 125 MMHG | BODY MASS INDEX: 28.23 KG/M2 | WEIGHT: 220 LBS | TEMPERATURE: 99 F | HEART RATE: 60 BPM | RESPIRATION RATE: 18 BRPM

## 2025-07-16 VITALS — TEMPERATURE: 99.5 F

## 2025-07-16 DIAGNOSIS — L89.312 PRESSURE ULCER OF RIGHT BUTTOCK, STAGE 2: ICD-10-CM

## 2025-07-16 DIAGNOSIS — Z98.890 OTHER SPECIFIED POSTPROCEDURAL STATES: Chronic | ICD-10-CM

## 2025-07-16 DIAGNOSIS — K25.5 CHRONIC OR UNSPECIFIED GASTRIC ULCER WITH PERFORATION: Chronic | ICD-10-CM

## 2025-07-16 DIAGNOSIS — S98.912A COMPLETE TRAUMATIC AMPUTATION OF LEFT FOOT, LEVEL UNSPECIFIED, INITIAL ENCOUNTER: Chronic | ICD-10-CM

## 2025-07-16 PROBLEM — E11.69 ABNORMAL METABOLIC STATE IN DIABETES MELLITUS: Status: ACTIVE | Noted: 2024-05-30

## 2025-07-16 PROBLEM — Z99.3 DEPENDENT FOR WHEELCHAIR MOBILITY: Status: ACTIVE | Noted: 2025-07-16

## 2025-07-16 PROCEDURE — 99213 OFFICE O/P EST LOW 20 MIN: CPT

## 2025-07-16 PROCEDURE — G0463: CPT

## 2025-07-16 RX ORDER — AMIODARONE HYDROCHLORIDE 200 MG/1
200 TABLET ORAL
Refills: 0 | Status: ACTIVE | COMMUNITY

## 2025-07-16 RX ORDER — DIGOXIN 0.06 MG/1
TABLET ORAL
Refills: 0 | Status: ACTIVE | COMMUNITY

## 2025-07-16 NOTE — PHYSICAL EXAM
[Normal Breath Sounds] : Normal breath sounds [Normal Heart Sounds] : normal heart sounds [0] : left 0 [Alert] : alert [Oriented to Person] : oriented to person [Oriented to Place] : oriented to place [Oriented to Time] : oriented to time [Calm] : calm [de-identified] : Well-developed, Well-nourished in no acute distress. [de-identified] : Within normal limits. [de-identified] : 3/5 muscle and tendons crossing the foot and ankle to the right lower extremity, 4/5  muscle strength to the foot and ankle to the left lower extremity  [de-identified] : Right heel ulcer healed [FreeTextEntry1] : Right Heel [de-identified] : HEALED 7/16/25 [de-identified] : No wound care product [FreeTextEntry7] : Bilateral buttock- EXTENDING TO LOWER BACK [de-identified] : scant serosanguinous  [de-identified] : incontinence dermatitis [de-identified] : Lotrisone, Triad Cream [de-identified] : Mechanically cleansed with sterile gauze and normal saline 0.9%  Recommended to F/U with dermatology   [de-identified] : Right Dorsal Foot (Healed) [de-identified] : Discoloration over vein [de-identified] : No Product [TWNoteComboBox4] : None [TWNoteComboBox5] : No [TWNoteComboBox6] : Pressure [de-identified] : No [de-identified] : Normal [de-identified] : None [de-identified] : None [de-identified] : None [de-identified] : No [de-identified] : False [de-identified] : False [de-identified] : False [de-identified] : No [de-identified] : Other [de-identified] : No [de-identified] : Erythema [de-identified] : None [de-identified] : None [de-identified] : No [de-identified] : Every other day [de-identified] : Primary Dressing [de-identified] : None [de-identified] : No [de-identified] : Pressure [de-identified] : No [de-identified] : other [de-identified] : None [de-identified] : False

## 2025-07-16 NOTE — PHYSICAL EXAM
[Normal Breath Sounds] : Normal breath sounds [Normal Heart Sounds] : normal heart sounds [Alert] : alert [Oriented to Person] : oriented to person [Oriented to Place] : oriented to place [Oriented to Time] : oriented to time [Calm] : calm [de-identified] : Well-developed, Well-nourished in no acute distress. [de-identified] : Within normal limits. [de-identified] : Within normal limits. [de-identified] : Right heel ulcer is clean, base is red and viable, moderate hyper granulation, no drainage, no odor, no acute infection, periwound skin is intact with no cellulitis. [FreeTextEntry1] : Right Heel [de-identified] : HEALED 7/16/25 [FreeTextEntry7] : Bilateral buttock extending to lower back [de-identified] : scant serosanguinous  [de-identified] : incontinence dermatitis [de-identified] : scattered areas of excoration [de-identified] : Lotrisone, Triad Cream [de-identified] : Mechanically cleansed with sterile gauze and normal saline 0.9%   [de-identified] : Right Dorsal Foot (Healed) [de-identified] : Discoloration over vein [TWNoteComboBox4] : None [TWNoteComboBox5] : No [TWNoteComboBox6] : Pressure [de-identified] : No [de-identified] : Normal [de-identified] : None [de-identified] : None [de-identified] : None [de-identified] : No [de-identified] : No [de-identified] : Other [de-identified] : No [de-identified] : Erythema [de-identified] : None [de-identified] : None [de-identified] : None [de-identified] : No [de-identified] : Daily [de-identified] : Primary Dressing [de-identified] : None [de-identified] : No [de-identified] : Pressure [de-identified] : No [de-identified] : other [de-identified] : None

## 2025-07-16 NOTE — ASSESSMENT
[Verbal] : Verbal [Written] : Written [Demo] : Demo [Patient] : Patient [Caregiver] : Caregiver [Good - alert, interested, motivated] : Good - alert, interested, motivated [Verbalizes knowledge/Understanding] : Verbalizes knowledge/understanding [Dressing changes] : dressing changes [Foot Care] : foot care [Skin Care] : skin care [Pressure relief] : pressure relief [Signs and symptoms of infection] : sign and symptoms of infection [Nutrition] : nutrition [How and When to Call] : how and when to call [Pain Management] : pain management [Off-loading] : off-loading [Patient responsibility to plan of care] : patient responsibility to plan of care [Stable] : stable [Other: ____] : [unfilled] [Wheelchair] : Wheelchair [Not Applicable - Long Term Care/Home Health Agency] : Long Term Care/Home Health Agency: Not Applicable [] : No [FreeTextEntry2] : Infection prevention Localized wound care  Goal remaining pain free regarding wounds Offloading  Promote optimal skin care and nutrition Can not obtain photo due to IT error message on iPad [FreeTextEntry4] : Recommend to F/U with dermatology for bilateral buttock/lower back skin irritation, as the lotrisone antifungal is not improving area and patient reports that he has a history of eczema WBAT on right lower extremity, right heel wound is 100% healed Pt to continue with turning and repositioning at least every 2 hours to alleviate pressure from buttocks.  Patient verbalized understanding of all discussed. WCC Instructions provided. F/U 4 weeks

## 2025-07-16 NOTE — PLAN
[FreeTextEntry1] : sacral /buttock and lower back area lotrisone then Triad,daily No Cover dressing off load area see dermatlogy return to office in 3 weeks.  20 minutes spent for patient care and medical decision making.

## 2025-07-16 NOTE — PHYSICAL EXAM
[Normal Breath Sounds] : Normal breath sounds [Normal Heart Sounds] : normal heart sounds [0] : left 0 [Alert] : alert [Oriented to Person] : oriented to person [Oriented to Place] : oriented to place [Oriented to Time] : oriented to time [Calm] : calm [de-identified] : Well-developed, Well-nourished in no acute distress. [de-identified] : Within normal limits. [de-identified] : 3/5 muscle and tendons crossing the foot and ankle to the right lower extremity, 4/5  muscle strength to the foot and ankle to the left lower extremity  [de-identified] : Right heel ulcer healed [FreeTextEntry1] : Right Heel [de-identified] : HEALED 7/16/25 [de-identified] : No wound care product [FreeTextEntry7] : Bilateral buttock- EXTENDING TO LOWER BACK [de-identified] : scant serosanguinous  [de-identified] : incontinence dermatitis [de-identified] : Lotrisone, Triad Cream [de-identified] : Mechanically cleansed with sterile gauze and normal saline 0.9%  Recommended to F/U with dermatology   [de-identified] : Right Dorsal Foot (Healed) [de-identified] : Discoloration over vein [de-identified] : No Product [TWNoteComboBox4] : None [TWNoteComboBox5] : No [TWNoteComboBox6] : Pressure [de-identified] : No [de-identified] : Normal [de-identified] : None [de-identified] : None [de-identified] : None [de-identified] : No [de-identified] : False [de-identified] : False [de-identified] : False [de-identified] : No [de-identified] : Other [de-identified] : No [de-identified] : Erythema [de-identified] : None [de-identified] : None [de-identified] : No [de-identified] : Every other day [de-identified] : Primary Dressing [de-identified] : None [de-identified] : No [de-identified] : Pressure [de-identified] : No [de-identified] : other [de-identified] : None [de-identified] : False

## 2025-07-16 NOTE — ASSESSMENT
[Verbal] : Verbal [Written] : Written [Demo] : Demo [Patient] : Patient [Caregiver] : Caregiver [Good - alert, interested, motivated] : Good - alert, interested, motivated [Verbalizes knowledge/Understanding] : Verbalizes knowledge/understanding [Dressing changes] : dressing changes [Foot Care] : foot care [Skin Care] : skin care [Pressure relief] : pressure relief [Signs and symptoms of infection] : sign and symptoms of infection [Nutrition] : nutrition [How and When to Call] : how and when to call [Pain Management] : pain management [Off-loading] : off-loading [Patient responsibility to plan of care] : patient responsibility to plan of care [Stable] : stable [Other: ____] : [unfilled] [Wheelchair] : Wheelchair [Not Applicable - Long Term Care/Home Health Agency] : Long Term Care/Home Health Agency: Not Applicable [] : No [FreeTextEntry2] : Infection prevention Localized wound care  Goal remaining pain free regarding wounds Offloading  Promote optimal skin care and nutrition Can not obtain photo due to IT error message on iPad [FreeTextEntry4] : Pt to continue with turning and repositioning at least every 2 hours to alleviate pressure from buttocks.  Recommended to F/U with dermatology for further management of bilateral buttock/lower back skin irritation (patient reports that he has a history of eczema)  Start PT at rehab, WBAT, no restrictions as the heel wound is 100% healed Patient verbalized understanding of all discussed. WCC Instructions provided. F/U 3 weeks

## 2025-07-16 NOTE — VITALS
[Pain related to present condition?] : The patient's  pain is not related to present condition. [] : No [de-identified] : 0/10

## 2025-07-16 NOTE — HISTORY OF PRESENT ILLNESS
[FreeTextEntry1] : Patient is 54 year old male presenting for f/u for right heel ulcer down to fat- noted with progress of healing . Patient is in rehab and is non ambulatory and is in wheel chair. Patient has been doing physical therapy at rehab.   7/16/25 Patient presenting for f/u for right heel wound down to fat, healed

## 2025-07-16 NOTE — VITALS
[Pain related to present condition?] : The patient's  pain is not related to present condition. [] : No [de-identified] : 0/10

## 2025-07-16 NOTE — PLAN
[FreeTextEntry1] : Patient seen and evaluated. Discussed etiology and treatment plan. Patient verbalized understanding.  Continue with offloading.  Patient happy with outcome of treatment.  Patient to follow-up as needed.  Spent 20 minutes on patient care and medical decision making.

## 2025-07-16 NOTE — VITALS
[Pain related to present condition?] : The patient's  pain is not related to present condition. [] : No [de-identified] : 0/10

## 2025-07-16 NOTE — HISTORY OF PRESENT ILLNESS
[FreeTextEntry1] : Right heel ulcer is clean, improving, no C/O 5/22/25 right heel ulcer smaller, No SOI.right dorsal foot wound dry, No SOI. Bilateral buttock excoriated 6/4/25 bilateral buttock excoriation. No open areas. Erythema worse. 6/18/25 buttock has improved with lotrisone. No SOI 7/2/25 both buttock have improved with the triad/lotrisone combo. No SOI. Patient notes less discomfort 7/16/25 area of involvement now includes lower back and may either be fungal or his eczema. Needs a dermatologic evaluation.

## 2025-07-17 DIAGNOSIS — Z79.899 OTHER LONG TERM (CURRENT) DRUG THERAPY: ICD-10-CM

## 2025-07-17 DIAGNOSIS — Z87.440 PERSONAL HISTORY OF URINARY (TRACT) INFECTIONS: ICD-10-CM

## 2025-07-17 DIAGNOSIS — Z79.01 LONG TERM (CURRENT) USE OF ANTICOAGULANTS: ICD-10-CM

## 2025-07-17 DIAGNOSIS — M86.671 OTHER CHRONIC OSTEOMYELITIS, RIGHT ANKLE AND FOOT: ICD-10-CM

## 2025-07-17 DIAGNOSIS — E11.40 TYPE 2 DIABETES MELLITUS WITH DIABETIC NEUROPATHY, UNSPECIFIED: ICD-10-CM

## 2025-07-17 DIAGNOSIS — D64.9 ANEMIA, UNSPECIFIED: ICD-10-CM

## 2025-07-17 DIAGNOSIS — E11.59 TYPE 2 DIABETES MELLITUS WITH OTHER CIRCULATORY COMPLICATIONS: ICD-10-CM

## 2025-07-17 DIAGNOSIS — E56.9 VITAMIN DEFICIENCY, UNSPECIFIED: ICD-10-CM

## 2025-07-17 DIAGNOSIS — L97.412 NON-PRESSURE CHRONIC ULCER OF RIGHT HEEL AND MIDFOOT WITH FAT LAYER EXPOSED: ICD-10-CM

## 2025-07-17 DIAGNOSIS — E11.69 TYPE 2 DIABETES MELLITUS WITH OTHER SPECIFIED COMPLICATION: ICD-10-CM

## 2025-07-17 DIAGNOSIS — M86.171 OTHER ACUTE OSTEOMYELITIS, RIGHT ANKLE AND FOOT: ICD-10-CM

## 2025-07-17 DIAGNOSIS — Z89.432 ACQUIRED ABSENCE OF LEFT FOOT: ICD-10-CM

## 2025-07-17 DIAGNOSIS — E87.6 HYPOKALEMIA: ICD-10-CM

## 2025-07-17 DIAGNOSIS — Z86.73 PERSONAL HISTORY OF TRANSIENT ISCHEMIC ATTACK (TIA), AND CEREBRAL INFARCTION WITHOUT RESIDUAL DEFICITS: ICD-10-CM

## 2025-07-17 DIAGNOSIS — L89.322 PRESSURE ULCER OF LEFT BUTTOCK, STAGE 2: ICD-10-CM

## 2025-07-17 DIAGNOSIS — Z87.891 PERSONAL HISTORY OF NICOTINE DEPENDENCE: ICD-10-CM

## 2025-07-17 DIAGNOSIS — I25.2 OLD MYOCARDIAL INFARCTION: ICD-10-CM

## 2025-07-17 DIAGNOSIS — Z79.84 LONG TERM (CURRENT) USE OF ORAL HYPOGLYCEMIC DRUGS: ICD-10-CM

## 2025-08-13 ENCOUNTER — APPOINTMENT (OUTPATIENT)
Dept: WOUND CARE | Facility: HOSPITAL | Age: 55
End: 2025-08-13

## (undated) DEVICE — WARMING BLANKET UPPER ADULT

## (undated) DEVICE — DRSG WEBRIL 6"

## (undated) DEVICE — SOL IRR POUR NS 0.9% 1000ML

## (undated) DEVICE — VENODYNE/SCD SLEEVE CALF MEDIUM

## (undated) DEVICE — PREP BETADINE KIT

## (undated) DEVICE — DRSG CURITY GAUZE SPONGE 4 X 4" 12-PLY

## (undated) DEVICE — DRAPE C ARM UNIVERSAL

## (undated) DEVICE — SYR LUER LOK 10CC

## (undated) DEVICE — DRSG KERLIX ROLL 4.5"

## (undated) DEVICE — BUR MICROAIRE CARBIDE OVAL 4MM 8 FLUTE

## (undated) DEVICE — BLADE SCALPEL SAFETYLOCK #15

## (undated) DEVICE — PACKING GAUZE PLAIN 0.5"

## (undated) DEVICE — GLV 7.5 PROTEXIS (WHITE)

## (undated) DEVICE — NDL VITOSS BONE MARROW 8GAX6CM

## (undated) DEVICE — GLV 7.5 PROTEXIS (CREAM) NEU-THERA

## (undated) DEVICE — STAPLER SKIN PROXIMATE

## (undated) DEVICE — PACKING GAUZE IODOFORM 0.5"

## (undated) DEVICE — DRAPE TOWEL BLUE 17" X 24"

## (undated) DEVICE — DRSG TELFA 3 X 8

## (undated) DEVICE — PLV-SCD MACHINE: Type: DURABLE MEDICAL EQUIPMENT

## (undated) DEVICE — S&N VERSAJET II EXACT HANDPIECE 14MM X 45 DEGREE

## (undated) DEVICE — ELCTR BOVIE PENCIL SMOKE EVACUATION

## (undated) DEVICE — NDL HYPO SAFE 18G X 1.5" (PINK)

## (undated) DEVICE — PLV/PSP-TOURNIQUET #1 3006JAEN: Type: DURABLE MEDICAL EQUIPMENT

## (undated) DEVICE — NDL HYPO SAFE 22G X 1.5" (BLACK)

## (undated) DEVICE — VENODYNE/SCD SLEEVE CALF LARGE

## (undated) DEVICE — TOURNIQUET CUFF 18" DUAL PORT SINGLE BLADDER LUER LOCK (BLACK)

## (undated) DEVICE — GLV 7 PROTEXIS (WHITE)

## (undated) DEVICE — GLV 6.5 PROTEXIS (BLUE)

## (undated) DEVICE — PLV/PSP-ESU FORCE2 FIL 16736T: Type: DURABLE MEDICAL EQUIPMENT

## (undated) DEVICE — PACK LOWER EXTREMITY NS PLAINVI

## (undated) DEVICE — SOL IRR BAG NS 0.9% 3000ML

## (undated) DEVICE — DRSG ACE BANDAGE 6"

## (undated) DEVICE — DRSG XEROFORM 1 X 8"

## (undated) DEVICE — ELCTR BOVIE TIP BLADE INSULATED 2.75" EDGE

## (undated) DEVICE — DRAIN RESERVOIR FOR JACKSON PRATT 100CC CARDINAL

## (undated) DEVICE — SAW BLADE LINVATEC SAGITTAL MIC 9.5X25.5X0.4MM

## (undated) DEVICE — DRSG COMBINE 5X9"

## (undated) DEVICE — SOL INJ NS 0.9% 1000ML

## (undated) DEVICE — DRSG KLING 4"

## (undated) DEVICE — DRAPE 3/4 SHEET W REINFORCEMENT 56X77"

## (undated) DEVICE — S&N VERSAJET II EXACT HANDPIECE 8MM X 45 DEGREE

## (undated) DEVICE — STRYKER INTERPULSE HANDPIECE W IRR SUCTION TUBE

## (undated) DEVICE — DRAPE INSTRUMENT POUCH 6.75" X 11"

## (undated) DEVICE — DRAIN JACKSON PRATT 7MM FLAT FULL NO TROCAR

## (undated) DEVICE — DRAPE C ARM MINI PACK FOR 6800

## (undated) DEVICE — PLV/PSP-ESU 3580: Type: DURABLE MEDICAL EQUIPMENT